# Patient Record
Sex: MALE | Race: WHITE | NOT HISPANIC OR LATINO | URBAN - METROPOLITAN AREA
[De-identification: names, ages, dates, MRNs, and addresses within clinical notes are randomized per-mention and may not be internally consistent; named-entity substitution may affect disease eponyms.]

---

## 2019-10-08 ENCOUNTER — INPATIENT (INPATIENT)
Facility: HOSPITAL | Age: 59
LOS: 6 days | Discharge: HOME | DRG: 291 | End: 2019-10-15
Attending: HOSPITALIST | Admitting: INTERNAL MEDICINE
Payer: MEDICARE

## 2019-10-08 VITALS — OXYGEN SATURATION: 100 % | HEART RATE: 102 BPM

## 2019-10-08 DIAGNOSIS — Z90.49 ACQUIRED ABSENCE OF OTHER SPECIFIED PARTS OF DIGESTIVE TRACT: Chronic | ICD-10-CM

## 2019-10-08 DIAGNOSIS — I77.9 DISORDER OF ARTERIES AND ARTERIOLES, UNSPECIFIED: Chronic | ICD-10-CM

## 2019-10-08 DIAGNOSIS — I50.9 HEART FAILURE, UNSPECIFIED: ICD-10-CM

## 2019-10-08 LAB
ALBUMIN SERPL ELPH-MCNC: 4.1 G/DL — SIGNIFICANT CHANGE UP (ref 3.3–5.2)
ALP SERPL-CCNC: 68 U/L — SIGNIFICANT CHANGE UP (ref 40–120)
ALT FLD-CCNC: 19 U/L — SIGNIFICANT CHANGE UP
ANION GAP SERPL CALC-SCNC: 16 MMOL/L — SIGNIFICANT CHANGE UP (ref 5–17)
APTT BLD: 31.1 SEC — SIGNIFICANT CHANGE UP (ref 27.5–36.3)
AST SERPL-CCNC: 23 U/L — SIGNIFICANT CHANGE UP
BASOPHILS # BLD AUTO: 0.08 K/UL — SIGNIFICANT CHANGE UP (ref 0–0.2)
BASOPHILS NFR BLD AUTO: 0.8 % — SIGNIFICANT CHANGE UP (ref 0–2)
BILIRUB SERPL-MCNC: 0.4 MG/DL — SIGNIFICANT CHANGE UP (ref 0.4–2)
BUN SERPL-MCNC: 20 MG/DL — SIGNIFICANT CHANGE UP (ref 8–20)
CALCIUM SERPL-MCNC: 9.6 MG/DL — SIGNIFICANT CHANGE UP (ref 8.6–10.2)
CHLORIDE SERPL-SCNC: 104 MMOL/L — SIGNIFICANT CHANGE UP (ref 98–107)
CO2 SERPL-SCNC: 23 MMOL/L — SIGNIFICANT CHANGE UP (ref 22–29)
CREAT SERPL-MCNC: 1.1 MG/DL — SIGNIFICANT CHANGE UP (ref 0.5–1.3)
EOSINOPHIL # BLD AUTO: 0.7 K/UL — HIGH (ref 0–0.5)
EOSINOPHIL NFR BLD AUTO: 6.6 % — HIGH (ref 0–6)
GAS PNL BLDV: SIGNIFICANT CHANGE UP
GLUCOSE SERPL-MCNC: 141 MG/DL — HIGH (ref 70–115)
HCT VFR BLD CALC: 47 % — SIGNIFICANT CHANGE UP (ref 39–50)
HGB BLD-MCNC: 15.5 G/DL — SIGNIFICANT CHANGE UP (ref 13–17)
IMM GRANULOCYTES NFR BLD AUTO: 0.2 % — SIGNIFICANT CHANGE UP (ref 0–1.5)
INR BLD: 1.07 RATIO — SIGNIFICANT CHANGE UP (ref 0.88–1.16)
LYMPHOCYTES # BLD AUTO: 3.97 K/UL — HIGH (ref 1–3.3)
LYMPHOCYTES # BLD AUTO: 37.7 % — SIGNIFICANT CHANGE UP (ref 13–44)
MCHC RBC-ENTMCNC: 30.3 PG — SIGNIFICANT CHANGE UP (ref 27–34)
MCHC RBC-ENTMCNC: 33 GM/DL — SIGNIFICANT CHANGE UP (ref 32–36)
MCV RBC AUTO: 91.8 FL — SIGNIFICANT CHANGE UP (ref 80–100)
MONOCYTES # BLD AUTO: 0.69 K/UL — SIGNIFICANT CHANGE UP (ref 0–0.9)
MONOCYTES NFR BLD AUTO: 6.6 % — SIGNIFICANT CHANGE UP (ref 2–14)
NEUTROPHILS # BLD AUTO: 5.07 K/UL — SIGNIFICANT CHANGE UP (ref 1.8–7.4)
NEUTROPHILS NFR BLD AUTO: 48.1 % — SIGNIFICANT CHANGE UP (ref 43–77)
NT-PROBNP SERPL-SCNC: 1310 PG/ML — HIGH (ref 0–300)
PLATELET # BLD AUTO: 263 K/UL — SIGNIFICANT CHANGE UP (ref 150–400)
POTASSIUM SERPL-MCNC: 3.9 MMOL/L — SIGNIFICANT CHANGE UP (ref 3.5–5.3)
POTASSIUM SERPL-SCNC: 3.9 MMOL/L — SIGNIFICANT CHANGE UP (ref 3.5–5.3)
PROT SERPL-MCNC: 7.9 G/DL — SIGNIFICANT CHANGE UP (ref 6.6–8.7)
PROTHROM AB SERPL-ACNC: 12.3 SEC — SIGNIFICANT CHANGE UP (ref 10–12.9)
RBC # BLD: 5.12 M/UL — SIGNIFICANT CHANGE UP (ref 4.2–5.8)
RBC # FLD: 12.9 % — SIGNIFICANT CHANGE UP (ref 10.3–14.5)
SODIUM SERPL-SCNC: 143 MMOL/L — SIGNIFICANT CHANGE UP (ref 135–145)
TROPONIN T SERPL-MCNC: <0.01 NG/ML — SIGNIFICANT CHANGE UP (ref 0–0.06)
WBC # BLD: 10.53 K/UL — HIGH (ref 3.8–10.5)
WBC # FLD AUTO: 10.53 K/UL — HIGH (ref 3.8–10.5)

## 2019-10-08 PROCEDURE — 71045 X-RAY EXAM CHEST 1 VIEW: CPT | Mod: 26

## 2019-10-08 PROCEDURE — 99223 1ST HOSP IP/OBS HIGH 75: CPT

## 2019-10-08 PROCEDURE — 93010 ELECTROCARDIOGRAM REPORT: CPT

## 2019-10-08 PROCEDURE — 71250 CT THORAX DX C-: CPT | Mod: 26

## 2019-10-08 PROCEDURE — 99291 CRITICAL CARE FIRST HOUR: CPT

## 2019-10-08 RX ORDER — NITROGLYCERIN 6.5 MG
1 CAPSULE, EXTENDED RELEASE ORAL ONCE
Refills: 0 | Status: COMPLETED | OUTPATIENT
Start: 2019-10-08 | End: 2019-10-08

## 2019-10-08 RX ORDER — SACCHAROMYCES BOULARDII 250 MG
250 POWDER IN PACKET (EA) ORAL
Refills: 0 | Status: DISCONTINUED | OUTPATIENT
Start: 2019-10-08 | End: 2019-10-10

## 2019-10-08 RX ORDER — ASPIRIN/CALCIUM CARB/MAGNESIUM 324 MG
81 TABLET ORAL DAILY
Refills: 0 | Status: DISCONTINUED | OUTPATIENT
Start: 2019-10-08 | End: 2019-10-10

## 2019-10-08 RX ORDER — ATENOLOL 25 MG/1
50 TABLET ORAL DAILY
Refills: 0 | Status: DISCONTINUED | OUTPATIENT
Start: 2019-10-08 | End: 2019-10-10

## 2019-10-08 RX ORDER — IPRATROPIUM/ALBUTEROL SULFATE 18-103MCG
3 AEROSOL WITH ADAPTER (GRAM) INHALATION EVERY 6 HOURS
Refills: 0 | Status: DISCONTINUED | OUTPATIENT
Start: 2019-10-08 | End: 2019-10-14

## 2019-10-08 RX ORDER — FUROSEMIDE 40 MG
40 TABLET ORAL
Refills: 0 | Status: DISCONTINUED | OUTPATIENT
Start: 2019-10-08 | End: 2019-10-09

## 2019-10-08 RX ORDER — PANTOPRAZOLE SODIUM 20 MG/1
40 TABLET, DELAYED RELEASE ORAL
Refills: 0 | Status: DISCONTINUED | OUTPATIENT
Start: 2019-10-08 | End: 2019-10-10

## 2019-10-08 RX ORDER — LEVETIRACETAM 250 MG/1
750 TABLET, FILM COATED ORAL
Refills: 0 | Status: DISCONTINUED | OUTPATIENT
Start: 2019-10-08 | End: 2019-10-10

## 2019-10-08 RX ORDER — ATORVASTATIN CALCIUM 80 MG/1
10 TABLET, FILM COATED ORAL AT BEDTIME
Refills: 0 | Status: DISCONTINUED | OUTPATIENT
Start: 2019-10-08 | End: 2019-10-10

## 2019-10-08 RX ORDER — FUROSEMIDE 40 MG
80 TABLET ORAL ONCE
Refills: 0 | Status: COMPLETED | OUTPATIENT
Start: 2019-10-08 | End: 2019-10-08

## 2019-10-08 RX ORDER — LISINOPRIL 2.5 MG/1
5 TABLET ORAL DAILY
Refills: 0 | Status: DISCONTINUED | OUTPATIENT
Start: 2019-10-08 | End: 2019-10-09

## 2019-10-08 RX ORDER — ENOXAPARIN SODIUM 100 MG/ML
40 INJECTION SUBCUTANEOUS DAILY
Refills: 0 | Status: DISCONTINUED | OUTPATIENT
Start: 2019-10-08 | End: 2019-10-15

## 2019-10-08 RX ORDER — AMLODIPINE BESYLATE 2.5 MG/1
10 TABLET ORAL DAILY
Refills: 0 | Status: DISCONTINUED | OUTPATIENT
Start: 2019-10-08 | End: 2019-10-10

## 2019-10-08 RX ADMIN — Medication 81 MILLIGRAM(S): at 18:37

## 2019-10-08 RX ADMIN — LEVETIRACETAM 750 MILLIGRAM(S): 250 TABLET, FILM COATED ORAL at 18:24

## 2019-10-08 RX ADMIN — Medication 80 MILLIGRAM(S): at 12:30

## 2019-10-08 RX ADMIN — ATORVASTATIN CALCIUM 10 MILLIGRAM(S): 80 TABLET, FILM COATED ORAL at 22:49

## 2019-10-08 RX ADMIN — Medication 1 INCH(S): at 12:38

## 2019-10-08 RX ADMIN — ENOXAPARIN SODIUM 40 MILLIGRAM(S): 100 INJECTION SUBCUTANEOUS at 18:38

## 2019-10-08 RX ADMIN — Medication 40 MILLIGRAM(S): at 18:39

## 2019-10-08 RX ADMIN — Medication 3 MILLILITER(S): at 22:06

## 2019-10-08 NOTE — CONSULT NOTE ADULT - SUBJECTIVE AND OBJECTIVE BOX
PULMONARY CONSULT NOTE      JONATHAN GIBSON  MRN-[id number]    Patient is a 59y old  Male who presents with a chief complaint of SOB    HISTORY OF PRESENT ILLNESS:    60 yo M with h/o HTN, CAD- stent, ? AI, thoracic aortic dissection requiring emergent surgery, CVA with left sided sensory loss, colostomy for bowel ischemia s/p reversal now presents with h/o of worsening SOB, ZUNIGA, orthopnea, and non-productive cough. He states this has happened before and n ER had severe sob with difficulty completing sentences. He also admits to significant anxiety. In ED, he needed bipap support but then weaned to NC O2 once he improved. He now feels much better but not quite back to baseline.  Pt with a h/o drug abuse with cocaine and ETOH though reportedly quit in 2001.    MEDICATIONS  (STANDING):  amLODIPine   Tablet 10 milliGRAM(s) Oral daily  aspirin enteric coated 81 milliGRAM(s) Oral daily  ATENolol  Tablet 50 milliGRAM(s) Oral daily  atorvastatin 10 milliGRAM(s) Oral at bedtime  enoxaparin Injectable 40 milliGRAM(s) SubCutaneous daily  furosemide   Injectable 40 milliGRAM(s) IV Push two times a day  levETIRAcetam 750 milliGRAM(s) Oral two times a day  levoFLOXacin IVPB      lisinopril 5 milliGRAM(s) Oral daily  pantoprazole    Tablet 40 milliGRAM(s) Oral before breakfast  saccharomyces boulardii 250 milliGRAM(s) Oral two times a day      MEDICATIONS  (PRN):      Allergies    penicillin (Unknown)    Intolerances        PAST MEDICAL & SURGICAL HISTORY:  CVA (cerebral vascular accident)  CHF (congestive heart failure)  H/O colectomy  Aorta disorder      FAMILY HISTORY:      SOCIAL HISTORY  Smoking History: 2-3 ppd x 20 years, quit 2009    REVIEW OF SYSTEMS:    CONSTITUTIONAL:  No fevers, chills, sweats    HEENT:  Eyes:  No diplopia or blurred vision. ENT:  No earache, sore throat or runny nose.    CARDIOVASCULAR:  No pressure, squeezing, tightness, or heaviness about the chest; no palpitations.    RESPIRATORY:  Per HPI    GASTROINTESTINAL:  No abdominal pain, nausea, vomiting or diarrhea.    GENITOURINARY:  No dysuria, frequency or urgency.    NEUROLOGIC:  No paresthesias, fasciculations, seizures or weakness.    PSYCHIATRIC:  No disorder of thought or mood.    Vital Signs Last 24 Hrs  T(C): 37.2 (08 Oct 2019 12:15), Max: 37.2 (08 Oct 2019 12:15)  T(F): 98.9 (08 Oct 2019 12:15), Max: 98.9 (08 Oct 2019 12:15)  HR: 80 (08 Oct 2019 13:09) (80 - 102)  BP: 179/79 (08 Oct 2019 13:09) (179/79 - 223/106)  BP(mean): --  RR: 22 (08 Oct 2019 13:09) (22 - 32)  SpO2: 94% (08 Oct 2019 13:09) (94% - 100%)    PHYSICAL EXAMINATION:    GENERAL: The patient is a well-developed, well-nourished obese male in no apparent distress.     HEENT: Head is normocephalic and atraumatic. Extraocular muscles are intact. Mucous membranes are moist.     NECK: Supple.     LUNGS: Clear to auscultation without wheezing or rhonchi but mild bibasilar rales. Respirations unlabored    HEART: Regular rate and rhythm without murmur.    ABDOMEN: Soft, nontender, and nondistended.  No hepatosplenomegaly is noted.    EXTREMITIES: Without any cyanosis, clubbing, with mild edema.    NEUROLOGIC: Grossly intact.      LABS:                        15.5   10.53 )-----------( 263      ( 08 Oct 2019 13:08 )             47.0     10-08    143  |  104  |  20.0  ----------------------------<  141<H>  3.9   |  23.0  |  1.10    Ca    9.6      08 Oct 2019 13:08    TPro  7.9  /  Alb  4.1  /  TBili  0.4  /  DBili  x   /  AST  23  /  ALT  19  /  AlkPhos  68  10-08    PT/INR - ( 08 Oct 2019 13:08 )   PT: 12.3 sec;   INR: 1.07 ratio         PTT - ( 08 Oct 2019 13:08 )  PTT:31.1 sec      CARDIAC MARKERS ( 08 Oct 2019 13:08 )  x     / <0.01 ng/mL / x     / x     / x            Serum Pro-Brain Natriuretic Peptide: 1310 pg/mL (10-08-19 @ 13:08)        RADIOLOGY & ADDITIONAL STUDIES:       EXAM:  CT CHEST                          PROCEDURE DATE:  10/08/2019          INTERPRETATION:  CLINICAL INFORMATION: Pulmonary nodules.     COMPARISON: 1/19/2010    PROCEDURE:   CT of the Chest was performed without intravenous contrast.  [place] coronal reformats were performed.      FINDINGS:    No evidence of mediastinal, hilar, or axillary lymphadenopathy. No   abnormalities seen at the base of the neck.    Small bilateral pleural effusions. No evidence of a pericardial effusion.    Coronary artery calcifications noted. Mild cardio megaly.    The visualized upper abdomen, calcifications are noted in keeping with   chronic aortic dissection. This is consistent with the finding of an   acute aortic dissection noted on 1/19/2010. 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 The aorta at this site   measures 3.1 cm in diameter on 1/19/2010. The proximal descending   thoracic aorta measures 4.3 cm no evidence of periaortic hematoma.    Diffuse groundglass interstitial prominence noted in the right lung,   right lower lobe worst and left lower lobe. Left basilar atelectasis.    Post sternotomy changes. This is a new finding since 1/19/2010. Evidence   of prior surgery involving the ascending thoracic aorta in keeping with   the presence of ascending thoracic aortic dissection noted on the prior   study.    No chest wall abnormality.    IMPRESSION:     Diffuse groundglass prominence noted throughout the right lung consistent   with diffuse pneumonia.    Small bilateral pleural effusions.    Left basilar atelectasis.    Interval development of an aneurysm at the level of the distal aortic   arch and proximal descending thoracic aorta measuring 4.1 cm and 4.3 cm   respectively since 1/19/2010. Cannot evaluate the lumen of the aorta   without IV contrast.          PEDRO LUIS COLEMAN M.D., ATTENDING RADIOLOGIST  This document has been electronically signed. Oct  8 2019  4:30PM

## 2019-10-08 NOTE — H&P ADULT - ASSESSMENT
58 yo M c PMH of HTn, CAD- stent, leaky aortic valve, thoracic aortic dissection requiring emergent surgery, CVA with left sided sensory loss, colostomy for bowel ischemia s/p reversal    # Acute  hypoxic respiratory failure  required BIPAP for a short while   now on NC O2  CT chest- GG opacities- possible pneumonia vs pulm edema  BNP elevated, mild leucocytosis  no h/o of both CHF and COPD  will give levaquin, oxygen, nebs  aggresive diuresis with lasix  ECHO  if no improvement will start steroid trial    # HTN  inital htn urgency in ED noted  now uncontrolled HTN  will resume h ome Bp meds and titrate as needed    # Pt states he overdoses on tylenol Pm and sleeps for 3 days in a row not waking for anything. and when he is awake binges on food until he throws up or has SOB. He also believes he has today's problems sec to panic attack.   suspect underlying anxiety/ depression with prior h/o addiction  will get psych consult    Lovenox

## 2019-10-08 NOTE — ED PROVIDER NOTE - CLINICAL SUMMARY MEDICAL DECISION MAKING FREE TEXT BOX
58 yo M c PMH of CHF, leaky aortic valve (s/p repair), CVA (c residual L sided weakness), colostomy s/p reversal p/w 2 week h/o of worsening SOB, ZUNIGA, orthopnea, and non-productive cough. On exam, /106, VS otherwise wnl, +diffuse rales marked increased wob no ble edema. started on bipap with improvement in sx. will obtain cxr/ekg/labs. will give lasix/ntg for chf exacerbation and admit

## 2019-10-08 NOTE — ED PROVIDER NOTE - ATTENDING CONTRIBUTION TO CARE
I personally saw the patient with the resident, and completed the key components of the history and physical exam. I then discussed the management plan with the resident.    58 y/o M with PMH CHF, CVA presents in respiratory distress x 2 weeks, gradually worsening, now unable to speak in full sentences. Does not wear O2 at home. Unable to complete ROS due to severe respiratory distress.    PE: Anxious, severe respiratory distress, tachycardic, diffuse rales in all lung fields, abd soft, NT/ND, old surgical scars on abdomen, no LE edema.    Patient placed on BiPAP, improved breathing, found to be hypertensive, will treat with 80 lasix and nitropaste to control BP and facilitate diuresis, plan for admission for CHF exacerbation.

## 2019-10-08 NOTE — H&P ADULT - NSHPPHYSICALEXAM_GEN_ALL_CORE
Vital Signs Last 24 Hrs  T(C): 37.2 (10-08-19 @ 12:15), Max: 37.2 (10-08-19 @ 12:15)  T(F): 98.9 (10-08-19 @ 12:15), Max: 98.9 (10-08-19 @ 12:15)  HR: 80 (10-08-19 @ 13:09) (80 - 102)  BP: 179/79 (10-08-19 @ 13:09) (179/79 - 223/106)  BP(mean): --  RR: 22 (10-08-19 @ 13:09) (22 - 32)  SpO2: 94% (10-08-19 @ 13:09) (94% - 100%)  GENERAL: no distress, comfortable lying in bed on NC O2  HEAD:  Atraumatic, Normocephalic  EYES: EOMI, conjunctiva and sclera clear  NECK: No JVD, Normal thyroid  NERVOUS SYSTEM:  Alert & Oriented X 3, Motor Strength 5/5 B/L upper and lower extremities;  CHEST/LUNG: [place] basal rales +, No rhonchi, wheezing, or rubs  HEART: Regular rate and rhythm; No murmurs, rubs, or gallops  ABDOMEN: Soft, Nontender, Nondistended; Bowel sounds present; surgical scars +   EXTREMITIES:  2+ Peripheral Pulses, No clubbing, cyanosis, or edema  SKIN: No rashes or lesions

## 2019-10-08 NOTE — ED PROVIDER NOTE - OBJECTIVE STATEMENT
58 yo M c PMH of CHF, leaky aortic valve (s/p repair), CVA (c residual L sided weakness), colostomy s/p reversal p/w 2 week h/o of worsening SOB, ZUNIGA, orthopnea, and non-productive cough. states this has happened before. has severe sob and difficulty completing sentences, does not know home meds. states he does not wear home o2/masks. cannot complete full hpi as pt needs urgent intervention for sob

## 2019-10-08 NOTE — ED PROVIDER NOTE - PROGRESS NOTE DETAILS
Cb PGY3: pt markedly improved on reassessment more alert no increased wob now off bipap, s/p ntg lasix and bipap will admit for chf exacerbation

## 2019-10-08 NOTE — H&P ADULT - HISTORY OF PRESENT ILLNESS
58 yo M c PMH of HTn, CAD- stent, leaky aortic valve, thoracic aortic dissection requiring emergent surgery, CVA with left sided sensory loss, colostomy for bowel ischemia s/p reversal now presents with h/o of worsening SOB, ZUNIGA, orthopnea, and non-productive cough. states this has happened before. has severe sob and difficulty completing sentences,   In ED needed bipap for a short while, then weaned to NC O2.    Sh- used to be cocaine adict, social alcohol user and 30x 3 ppd history. quit all in 2001, lives alone  now addicted to tylenol PM and overdoses on same   FH- everybody is an alcoholic

## 2019-10-09 DIAGNOSIS — F33.1 MAJOR DEPRESSIVE DISORDER, RECURRENT, MODERATE: ICD-10-CM

## 2019-10-09 LAB
ANION GAP SERPL CALC-SCNC: 18 MMOL/L — HIGH (ref 5–17)
BUN SERPL-MCNC: 25 MG/DL — HIGH (ref 8–20)
CALCIUM SERPL-MCNC: 9.4 MG/DL — SIGNIFICANT CHANGE UP (ref 8.6–10.2)
CHLORIDE SERPL-SCNC: 101 MMOL/L — SIGNIFICANT CHANGE UP (ref 98–107)
CO2 SERPL-SCNC: 26 MMOL/L — SIGNIFICANT CHANGE UP (ref 22–29)
CREAT SERPL-MCNC: 1.18 MG/DL — SIGNIFICANT CHANGE UP (ref 0.5–1.3)
GLUCOSE SERPL-MCNC: 91 MG/DL — SIGNIFICANT CHANGE UP (ref 70–115)
HCT VFR BLD CALC: 46.2 % — SIGNIFICANT CHANGE UP (ref 39–50)
HCV AB S/CO SERPL IA: 0.19 S/CO — SIGNIFICANT CHANGE UP (ref 0–0.99)
HCV AB SERPL-IMP: SIGNIFICANT CHANGE UP
HGB BLD-MCNC: 15 G/DL — SIGNIFICANT CHANGE UP (ref 13–17)
MCHC RBC-ENTMCNC: 30.4 PG — SIGNIFICANT CHANGE UP (ref 27–34)
MCHC RBC-ENTMCNC: 32.5 GM/DL — SIGNIFICANT CHANGE UP (ref 32–36)
MCV RBC AUTO: 93.5 FL — SIGNIFICANT CHANGE UP (ref 80–100)
PLATELET # BLD AUTO: 213 K/UL — SIGNIFICANT CHANGE UP (ref 150–400)
POTASSIUM SERPL-MCNC: 3.4 MMOL/L — LOW (ref 3.5–5.3)
POTASSIUM SERPL-SCNC: 3.4 MMOL/L — LOW (ref 3.5–5.3)
RBC # BLD: 4.94 M/UL — SIGNIFICANT CHANGE UP (ref 4.2–5.8)
RBC # FLD: 13.1 % — SIGNIFICANT CHANGE UP (ref 10.3–14.5)
SODIUM SERPL-SCNC: 145 MMOL/L — SIGNIFICANT CHANGE UP (ref 135–145)
WBC # BLD: 10.58 K/UL — HIGH (ref 3.8–10.5)
WBC # FLD AUTO: 10.58 K/UL — HIGH (ref 3.8–10.5)

## 2019-10-09 PROCEDURE — 99233 SBSQ HOSP IP/OBS HIGH 50: CPT

## 2019-10-09 PROCEDURE — 93306 TTE W/DOPPLER COMPLETE: CPT | Mod: 26

## 2019-10-09 PROCEDURE — 90792 PSYCH DIAG EVAL W/MED SRVCS: CPT

## 2019-10-09 RX ORDER — LANOLIN ALCOHOL/MO/W.PET/CERES
3 CREAM (GRAM) TOPICAL ONCE
Refills: 0 | Status: COMPLETED | OUTPATIENT
Start: 2019-10-09 | End: 2019-10-09

## 2019-10-09 RX ORDER — LISINOPRIL 2.5 MG/1
10 TABLET ORAL DAILY
Refills: 0 | Status: DISCONTINUED | OUTPATIENT
Start: 2019-10-09 | End: 2019-10-10

## 2019-10-09 RX ORDER — POTASSIUM CHLORIDE 20 MEQ
40 PACKET (EA) ORAL ONCE
Refills: 0 | Status: COMPLETED | OUTPATIENT
Start: 2019-10-09 | End: 2019-10-09

## 2019-10-09 RX ORDER — ZOLPIDEM TARTRATE 10 MG/1
5 TABLET ORAL AT BEDTIME
Refills: 0 | Status: DISCONTINUED | OUTPATIENT
Start: 2019-10-09 | End: 2019-10-10

## 2019-10-09 RX ORDER — FUROSEMIDE 40 MG
40 TABLET ORAL DAILY
Refills: 0 | Status: DISCONTINUED | OUTPATIENT
Start: 2019-10-09 | End: 2019-10-10

## 2019-10-09 RX ADMIN — Medication 3 MILLILITER(S): at 14:29

## 2019-10-09 RX ADMIN — ENOXAPARIN SODIUM 40 MILLIGRAM(S): 100 INJECTION SUBCUTANEOUS at 13:09

## 2019-10-09 RX ADMIN — Medication 40 MILLIEQUIVALENT(S): at 17:12

## 2019-10-09 RX ADMIN — ATENOLOL 50 MILLIGRAM(S): 25 TABLET ORAL at 05:17

## 2019-10-09 RX ADMIN — ZOLPIDEM TARTRATE 5 MILLIGRAM(S): 10 TABLET ORAL at 22:49

## 2019-10-09 RX ADMIN — Medication 81 MILLIGRAM(S): at 13:09

## 2019-10-09 RX ADMIN — LEVETIRACETAM 750 MILLIGRAM(S): 250 TABLET, FILM COATED ORAL at 17:11

## 2019-10-09 RX ADMIN — Medication 3 MILLILITER(S): at 04:47

## 2019-10-09 RX ADMIN — Medication 250 MILLIGRAM(S): at 17:11

## 2019-10-09 RX ADMIN — Medication 40 MILLIGRAM(S): at 05:17

## 2019-10-09 RX ADMIN — Medication 250 MILLIGRAM(S): at 05:16

## 2019-10-09 RX ADMIN — Medication 3 MILLILITER(S): at 20:42

## 2019-10-09 RX ADMIN — Medication 3 MILLIGRAM(S): at 02:03

## 2019-10-09 RX ADMIN — Medication 3 MILLILITER(S): at 08:27

## 2019-10-09 RX ADMIN — PANTOPRAZOLE SODIUM 40 MILLIGRAM(S): 20 TABLET, DELAYED RELEASE ORAL at 05:16

## 2019-10-09 RX ADMIN — ATORVASTATIN CALCIUM 10 MILLIGRAM(S): 80 TABLET, FILM COATED ORAL at 22:49

## 2019-10-09 RX ADMIN — Medication 1 INCH(S): at 00:47

## 2019-10-09 RX ADMIN — LEVETIRACETAM 750 MILLIGRAM(S): 250 TABLET, FILM COATED ORAL at 05:17

## 2019-10-09 RX ADMIN — LISINOPRIL 5 MILLIGRAM(S): 2.5 TABLET ORAL at 05:17

## 2019-10-09 RX ADMIN — AMLODIPINE BESYLATE 10 MILLIGRAM(S): 2.5 TABLET ORAL at 05:16

## 2019-10-09 RX ADMIN — LISINOPRIL 10 MILLIGRAM(S): 2.5 TABLET ORAL at 13:09

## 2019-10-09 NOTE — BEHAVIORAL HEALTH ASSESSMENT NOTE - HPI (INCLUDE ILLNESS QUALITY, SEVERITY, DURATION, TIMING, CONTEXT, MODIFYING FACTORS, ASSOCIATED SIGNS AND SYMPTOMS)
Patient is a 59 year old, male; domicile by self; never ; unemployed on disability ; with no formal history of psychiatric treatment but with h/o anxiety and depression last 10 years; no psychiatric  hospitalizations; no known suicide attempts; h/o prior cocaine use (no use since ) ; PMH of HTN, CAD- stent, leaky aortic valve, thoracic aortic dissection requiring emergent surgery, CVA with left sided sensory loss, colostomy for bowel ischemia s/p reversal now presents with h/o of worsening SOB. Currently being treated for CHF exacerbation vs pneumonia. Asked for psychiatric evaluation and medication recommendation.       Patient reports that he has h/o prior "wild" , fast life and and has past of using cocaine, going to clubs while at the same time remaining functional and working as a .  He has multiple medical problems which include 2 heart attacks (last in ) and stroke. Since then he stopped all substance use and cigarette smoking and has some emergence of anxiety. He reports anxiety usually in context of medical problems or medical symptoms.  At times he has panic attacks that are triggered. His mother also  2 years ago. He takes care of her dog that lives with him.        Patient admits that he is largely isolative the last 8 years and stays mostly in his house. He sometimes shops at night and has contact with two brothers.  He has been on Cymbalta in the past for neuropathic pain and he stopped it 3 months ago (didn't find helpful).  Patient reports that his appetite fluctuates, he has decreased motivation, decreased energy and concentration, no drive, poor concentration, and disturbed sleep. He has been taking tylenol PM for years and has high tolerance. He reports he often takes handful of medications to sleep and can spend two to three days in bed- just getting up enough to take care of dog.  He eats erratically and his weight can fluctuate 40-50 lbs up and down.  Patient denies any intention to hurt himself. He states "I am not doing anything stupid, not looking to kill self or anything".      Concerning other psychiatric symptoms, pt denies  any aggressive or violent behavior towards others. Pt denies any episodes of bizarre happiness, unusual energy, unusual nightime excitation or other common symptoms of sapna. Pt denies hearing voices or seeing things.  No delusions were elicited.  Patient denies obsessions or compulsions.      Patient is not interested in medications at this time, but discussed possible options and is willing to consider. Support was provided. Patient states he is willing to consider going to therapy. His family has been urging him for years.

## 2019-10-09 NOTE — BEHAVIORAL HEALTH ASSESSMENT NOTE - NSBHCHARTREVIEWLAB_PSY_A_CORE FT
15.0   10.58 )-----------( 213      ( 09 Oct 2019 07:27 )             46.2     10-09    145  |  101  |  25.0<H>  ----------------------------<  91  3.4<L>   |  26.0  |  1.18    Ca    9.4      09 Oct 2019 07:27    TPro  7.9  /  Alb  4.1  /  TBili  0.4  /  DBili  x   /  AST  23  /  ALT  19  /  AlkPhos  68  10-08    LIVER FUNCTIONS - ( 08 Oct 2019 13:08 )  Alb: 4.1 g/dL / Pro: 7.9 g/dL / ALK PHOS: 68 U/L / ALT: 19 U/L / AST: 23 U/L / GGT: x           PT/INR - ( 08 Oct 2019 13:08 )   PT: 12.3 sec;   INR: 1.07 ratio         PTT - ( 08 Oct 2019 13:08 )  PTT:31.1 sec

## 2019-10-09 NOTE — BEHAVIORAL HEALTH ASSESSMENT NOTE - NSBHCHARTREVIEWVS_PSY_A_CORE FT
Vital Signs Last 24 Hrs  T(C): 36.4 (09 Oct 2019 07:37), Max: 36.8 (09 Oct 2019 05:17)  T(F): 97.6 (09 Oct 2019 07:37), Max: 98.2 (09 Oct 2019 05:17)  HR: 74 (09 Oct 2019 08:43) (65 - 80)  BP: 160/68 (09 Oct 2019 07:37) (153/61 - 179/79)  BP(mean): --  RR: 18 (09 Oct 2019 07:37) (18 - 22)  SpO2: 95% (09 Oct 2019 08:43) (94% - 99%)

## 2019-10-09 NOTE — BEHAVIORAL HEALTH ASSESSMENT NOTE - SUMMARY
Patient is a 59 year old, male; domicile by self; never ; unemployed on disability ; with no formal history of psychiatric treatment but with h/o anxiety and depression last 10 years; no psychiatric  hospitalizations; no known suicide attempts; h/o prior cocaine use (no use since 2001) ; PMH of HTN, CAD- stent, leaky aortic valve, thoracic aortic dissection requiring emergent surgery, CVA with left sided sensory loss, colostomy for bowel ischemia s/p reversal now presents with h/o of worsening SOB. Currently being treated for CHF exacerbation vs pneumonia. Asked for psychiatric evaluation and medication recommendation.  Patient does reports h/o consistent with moderate MDD, denies any S/H I/I/P with stress related to medical problems, loss of mother 2 years ago.  He has also been taking excessive tylenol PM every night for last 10 years to help sleep. He sometimes spends 2-3 days in bed.  Denies any suicidal intent.  He does have cocaine abuse in remission since 2001, and gets panic attacks at times triggered by medical condition or symptoms.  Discussed tx options. Does not want to take medications at this time but will consider.  Support provided. Agreed to outpatient referral to [facility]. Patient reports he does not do well in groupss

## 2019-10-09 NOTE — PROGRESS NOTE ADULT - ASSESSMENT
60 yo M c PMH of HTn, CAD- stent, leaky aortic valve, thoracic aortic dissection requiring emergent surgery, CVA with left sided sensory loss, colostomy for bowel ischemia s/p reversal    # Acute  hypoxic respiratory failure  required BIPAP for a short while   now on room air  CT chest- GG opacities- possible pneumonia vs pulm edema  BNP elevated, mild leucocytosis  no h/o of both CHF and COPD  non toxic, no fever  will give levaquin, oxygen, nebs  aggressive diuresis with lasix  ECHO - P  if no improvement will start steroid trial  d/w cardiology- his Feb ECHO with EF 45-50% and mild to mod MR    # HTN  inital htn urgency in ED noted  now uncontrolled HTN  will resume home Bp meds and titrate as needed    # Pt states he overdoses on tylenol Pm and sleeps for 3 days in a row not waking for anything. and when he is awake binges on food until he throws up or has SOB. He also believes he has today's problems sec to panic attack.   suspect underlying anxiety/ depression with prior h/o addiction  appreciate psych consult  o/p psych appointment with [facility] to be set up by [facility]    Possible d/c home in AM with PO antibiotics and po lasix. if symptoms persist or worsen then will need uptitration of antibiotics given that he is on Humira.

## 2019-10-09 NOTE — PROGRESS NOTE ADULT - ASSESSMENT
Former smoker  Possible underlying COPD given smoking hx  H/o CAD s/p stent  Given elevated BNP with b/l GGO which could indicate pulm edema, CHF is of concern  Pt reports h/o VHD; ? AI  Obese  Hypoxic  S/p respiratory failure requiring BiPAP therapy; now on NCO2    Rec:    Drug nebs for suspected COPD given smoking hx  Empiric abx for possible pneumonia though denies cough, fevers, and does not have leukocytosis  Continue diuresis as tolerates  Optimize cardiac function  Cardiology f/u  Observe off of steroids for now as pt without B-spasm on exam  Need outpt f/u of ct  Consider outpt PSG for possible COLLEEN  Weight loss  PFTs as outpt   Pulm f/u after d/c    d/w med

## 2019-10-09 NOTE — PROGRESS NOTE ADULT - SUBJECTIVE AND OBJECTIVE BOX
PULMONARY PROGRESS NOTE      [id number]    Patient is a 59y old  Male who presents with a chief complaint of SOB (08 Oct 2019 18:29)      INTERVAL HPI/OVERNIGHT EVENTS: Feeling much better. Denies any sob or cough now. Says he feels great.     MEDICATIONS  (STANDING):  ALBUTerol/ipratropium for Nebulization 3 milliLiter(s) Nebulizer every 6 hours  amLODIPine   Tablet 10 milliGRAM(s) Oral daily  aspirin enteric coated 81 milliGRAM(s) Oral daily  ATENolol  Tablet 50 milliGRAM(s) Oral daily  atorvastatin 10 milliGRAM(s) Oral at bedtime  enoxaparin Injectable 40 milliGRAM(s) SubCutaneous daily  furosemide   Injectable 40 milliGRAM(s) IV Push daily  levETIRAcetam 750 milliGRAM(s) Oral two times a day  levoFLOXacin IVPB      levoFLOXacin IVPB 750 milliGRAM(s) IV Intermittent every 24 hours  lisinopril 10 milliGRAM(s) Oral daily  pantoprazole    Tablet 40 milliGRAM(s) Oral before breakfast  potassium chloride    Tablet ER 40 milliEquivalent(s) Oral once  saccharomyces boulardii 250 milliGRAM(s) Oral two times a day      MEDICATIONS  (PRN):      Allergies    penicillin (Unknown)    Intolerances        PAST MEDICAL & SURGICAL HISTORY:  CVA (cerebral vascular accident)  CHF (congestive heart failure)  H/O colectomy  Aorta disorder          REVIEW OF SYSTEMS:    CONSTITUTIONAL:  No distress    HEENT:  Eyes:  No diplopia or blurred vision. ENT:  No earache, sore throat or runny nose.    CARDIOVASCULAR:  No pressure, squeezing, tightness, heaviness or aching about the chest; no palpitations.    RESPIRATORY:  per HPI     GASTROINTESTINAL:  No nausea, vomiting or diarrhea.    GENITOURINARY:  No dysuria, frequency or urgency.    NEUROLOGIC:  No paresthesias, fasciculations, seizures or weakness.    PSYCHIATRIC:  No disorder of thought or mood.    Vital Signs Last 24 Hrs  T(C): 36.4 (09 Oct 2019 07:37), Max: 36.8 (09 Oct 2019 05:17)  T(F): 97.6 (09 Oct 2019 07:37), Max: 98.2 (09 Oct 2019 05:17)  HR: 74 (09 Oct 2019 08:43) (65 - 79)  BP: 160/68 (09 Oct 2019 07:37) (153/61 - 165/70)  BP(mean): --  RR: 18 (09 Oct 2019 07:37) (18 - 20)  SpO2: 95% (09 Oct 2019 08:43) (95% - 99%)    PHYSICAL EXAMINATION:    GENERAL: The patient is awake and alert in no apparent distress.     HEENT: Head is normocephalic and atraumatic.  Mucous membranes are moist.    NECK: Supple.    LUNGS: rales at bases, no wheeze,  respirations unlabored    HEART: Regular rate and rhythm     ABDOMEN: Soft, nontender,  obese    EXTREMITIES: Without any cyanosis, clubbing, rash, lesions or edema.    NEUROLOGIC: Grossly intact.    LABS:                        15.0   10.58 )-----------( 213      ( 09 Oct 2019 07:27 )             46.2     10-09    145  |  101  |  25.0<H>  ----------------------------<  91  3.4<L>   |  26.0  |  1.18    Ca    9.4      09 Oct 2019 07:27    TPro  7.9  /  Alb  4.1  /  TBili  0.4  /  DBili  x   /  AST  23  /  ALT  19  /  AlkPhos  68  10-08    PT/INR - ( 08 Oct 2019 13:08 )   PT: 12.3 sec;   INR: 1.07 ratio         PTT - ( 08 Oct 2019 13:08 )  PTT:31.1 sec      CARDIAC MARKERS ( 08 Oct 2019 13:08 )  x     / <0.01 ng/mL / x     / x     / x            Serum Pro-Brain Natriuretic Peptide: 1310 pg/mL (10-08-19 @ 13:08)           RADIOLOGY & ADDITIONAL STUDIES:  < from: CT Chest No Cont (10.08.19 @ 16:04) >     EXAM:  CT CHEST                          PROCEDURE DATE:  10/08/2019          INTERPRETATION:  CLINICAL INFORMATION: Pulmonary nodules.     COMPARISON: 1/19/2010    PROCEDURE:   CT of the Chest was performed without intravenous contrast.  [place] coronal reformats were performed.      FINDINGS:    No evidence of mediastinal, hilar, or axillary lymphadenopathy. No   abnormalities seen at the base of the neck.    Small bilateral pleural effusions. No evidence of a pericardial effusion.    Coronary artery calcifications noted. Mild cardio megaly.    The visualized upper abdomen, calcifications are noted in keeping with   chronic aortic dissection. This is consistent with the finding of an   acute aortic dissection noted on 1/19/2010. 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 The aorta at this site   measures 3.1 cm in diameter on 1/19/2010. The proximal descending   thoracic aorta measures 4.3 cm no evidence of periaortic hematoma.    Diffuse groundglass interstitial prominence noted in the right lung,   right lower lobe worst and left lower lobe. Left basilar atelectasis.    Post sternotomy changes. This is a new finding since 1/19/2010. Evidence   of prior surgery involving the ascending thoracic aorta in keeping with   the presence of ascending thoracic aortic dissection noted on the prior   study.    No chest wall abnormality.    IMPRESSION:     Diffuse groundglass prominence noted throughout the right lung consistent   with diffuse pneumonia.    Small bilateral pleural effusions.    Left basilar atelectasis.    Interval development of an aneurysm at the level of the distal aortic   arch and proximal descending thoracic aorta measuring 4.1 cm and 4.3 cm   respectively since 1/19/2010. Cannot evaluate the lumen of the aorta   without IV contrast.                    PEDRO LUIS COLEMAN M.D., ATTENDING RADIOLOGIST    < end of copied text >

## 2019-10-09 NOTE — PROGRESS NOTE ADULT - SUBJECTIVE AND OBJECTIVE BOX
HEALTH ISSUES - PROBLEM Dx:  60 yo M c PMH of HTN, CAD- stent, leaky aortic valve, thoracic aortic dissection requiring emergent surgery, CVA with left sided sensory loss, colostomy for bowel ischemia s/p reversal    acute hypoxic resp failure- chf vs pna    INTERVAL HPI/ OVERNIGHT EVENTS:    comfortable on room air  d/w cardiology  explained possible discharge home in AM    REVIEW OF SYSTEMS:    fever- hypoxia - sob - cough -     Vital Signs Last 24 Hrs  T(C): 36.8 (09 Oct 2019 16:04), Max: 36.8 (09 Oct 2019 05:17)  T(F): 98.3 (09 Oct 2019 16:04), Max: 98.3 (09 Oct 2019 16:04)  HR: 80 (09 Oct 2019 16:04) (65 - 80)  BP: 181/75 (09 Oct 2019 16:04) (153/61 - 181/75)  BP(mean): --  RR: 18 (09 Oct 2019 16:04) (18 - 18)  SpO2: 96% (09 Oct 2019 16:04) (95% - 99%)    PHYSICAL EXAM-  GENERAL: no distress, comfortable lying in bed on room air  HEAD:  Atraumatic, Normocephalic  EYES: EOMI, conjunctiva and sclera clear  NECK: No JVD, Normal thyroid  NERVOUS SYSTEM:  Alert & Oriented X 3, Motor Strength 5/5 B/L upper and lower extremities;  CHEST/LUNG: CTA [place], No rhonchi, wheezing, or rubs  HEART: Regular rate and rhythm; No murmurs, rubs, or gallops  ABDOMEN: Soft, Nontender, Nondistended; Bowel sounds present; surgical scars +   EXTREMITIES:  2+ Peripheral Pulses, No clubbing, cyanosis, or edema  SKIN: No rashes or lesions    MEDICATIONS  (STANDING):  ALBUTerol/ipratropium for Nebulization 3 milliLiter(s) Nebulizer every 6 hours  amLODIPine   Tablet 10 milliGRAM(s) Oral daily  aspirin enteric coated 81 milliGRAM(s) Oral daily  ATENolol  Tablet 50 milliGRAM(s) Oral daily  atorvastatin 10 milliGRAM(s) Oral at bedtime  enoxaparin Injectable 40 milliGRAM(s) SubCutaneous daily  furosemide   Injectable 40 milliGRAM(s) IV Push daily  levETIRAcetam 750 milliGRAM(s) Oral two times a day  levoFLOXacin IVPB      levoFLOXacin IVPB 750 milliGRAM(s) IV Intermittent every 24 hours  lisinopril 10 milliGRAM(s) Oral daily  pantoprazole    Tablet 40 milliGRAM(s) Oral before breakfast  saccharomyces boulardii 250 milliGRAM(s) Oral two times a day    MEDICATIONS  (PRN):      LABS:                        15.0   10.58 )-----------( 213      ( 09 Oct 2019 07:27 )             46.2     10-09    145  |  101  |  25.0<H>  ----------------------------<  91  3.4<L>   |  26.0  |  1.18    Ca    9.4      09 Oct 2019 07:27    TPro  7.9  /  Alb  4.1  /  TBili  0.4  /  DBili  x   /  AST  23  /  ALT  19  /  AlkPhos  68  10-08    PT/INR - ( 08 Oct 2019 13:08 )   PT: 12.3 sec;   INR: 1.07 ratio         PTT - ( 08 Oct 2019 13:08 )  PTT:31.1 sec

## 2019-10-09 NOTE — BEHAVIORAL HEALTH ASSESSMENT NOTE - DESCRIPTION (FIRST USE, LAST USE, QUANTITY, FREQUENCY, DURATION)
prior smoking history. Denies since 2001 h/o heavy cocaine use since 20's-stopped after heart attack in 2001 reports some h/o benzo abuse but has not taken since prior to 2001

## 2019-10-09 NOTE — BEHAVIORAL HEALTH ASSESSMENT NOTE - RISK ASSESSMENT
Low Acute Suicide Risk Patient does take excessive tyenol PM every night for last 10 years but not as suicide attempt, also has panic attacks and anxiety triggered by medical symptoms, depressed mood and isolative, however no h/o prior suicide attempt, has some support, takes care of dog, no psychotic, has maintained sobriety of cocaine and other substances for 10 years, reports willingness to engage with outpatient tx and consider other medication options.

## 2019-10-10 ENCOUNTER — TRANSCRIPTION ENCOUNTER (OUTPATIENT)
Age: 59
End: 2019-10-10

## 2019-10-10 LAB
ANION GAP SERPL CALC-SCNC: 14 MMOL/L — SIGNIFICANT CHANGE UP (ref 5–17)
BUN SERPL-MCNC: 29 MG/DL — HIGH (ref 8–20)
CALCIUM SERPL-MCNC: 9.4 MG/DL — SIGNIFICANT CHANGE UP (ref 8.6–10.2)
CHLORIDE SERPL-SCNC: 101 MMOL/L — SIGNIFICANT CHANGE UP (ref 98–107)
CO2 SERPL-SCNC: 26 MMOL/L — SIGNIFICANT CHANGE UP (ref 22–29)
CREAT SERPL-MCNC: 1.29 MG/DL — SIGNIFICANT CHANGE UP (ref 0.5–1.3)
GLUCOSE SERPL-MCNC: 114 MG/DL — SIGNIFICANT CHANGE UP (ref 70–115)
POTASSIUM SERPL-MCNC: 3.5 MMOL/L — SIGNIFICANT CHANGE UP (ref 3.5–5.3)
POTASSIUM SERPL-SCNC: 3.5 MMOL/L — SIGNIFICANT CHANGE UP (ref 3.5–5.3)
SODIUM SERPL-SCNC: 141 MMOL/L — SIGNIFICANT CHANGE UP (ref 135–145)
TROPONIN T SERPL-MCNC: <0.01 NG/ML — SIGNIFICANT CHANGE UP (ref 0–0.06)
TROPONIN T SERPL-MCNC: <0.01 NG/ML — SIGNIFICANT CHANGE UP (ref 0–0.06)

## 2019-10-10 PROCEDURE — 99232 SBSQ HOSP IP/OBS MODERATE 35: CPT

## 2019-10-10 PROCEDURE — 71275 CT ANGIOGRAPHY CHEST: CPT | Mod: 26

## 2019-10-10 PROCEDURE — 74174 CTA ABD&PLVS W/CONTRAST: CPT | Mod: 26

## 2019-10-10 PROCEDURE — 99233 SBSQ HOSP IP/OBS HIGH 50: CPT

## 2019-10-10 PROCEDURE — 93010 ELECTROCARDIOGRAM REPORT: CPT

## 2019-10-10 RX ORDER — LACTULOSE 10 G/15ML
10 SOLUTION ORAL AT BEDTIME
Refills: 0 | Status: DISCONTINUED | OUTPATIENT
Start: 2019-10-10 | End: 2019-10-10

## 2019-10-10 RX ORDER — LEVETIRACETAM 250 MG/1
750 TABLET, FILM COATED ORAL EVERY 12 HOURS
Refills: 0 | Status: DISCONTINUED | OUTPATIENT
Start: 2019-10-10 | End: 2019-10-15

## 2019-10-10 RX ORDER — SENNA PLUS 8.6 MG/1
2 TABLET ORAL
Qty: 60 | Refills: 0
Start: 2019-10-10 | End: 2019-11-08

## 2019-10-10 RX ORDER — METOPROLOL TARTRATE 50 MG
5 TABLET ORAL EVERY 6 HOURS
Refills: 0 | Status: DISCONTINUED | OUTPATIENT
Start: 2019-10-10 | End: 2019-10-11

## 2019-10-10 RX ORDER — PANTOPRAZOLE SODIUM 20 MG/1
40 TABLET, DELAYED RELEASE ORAL DAILY
Refills: 0 | Status: DISCONTINUED | OUTPATIENT
Start: 2019-10-10 | End: 2019-10-14

## 2019-10-10 RX ORDER — LACTULOSE 10 G/15ML
10 SOLUTION ORAL ONCE
Refills: 0 | Status: COMPLETED | OUTPATIENT
Start: 2019-10-10 | End: 2019-10-10

## 2019-10-10 RX ORDER — DOCUSATE SODIUM 100 MG
100 CAPSULE ORAL THREE TIMES A DAY
Refills: 0 | Status: DISCONTINUED | OUTPATIENT
Start: 2019-10-10 | End: 2019-10-10

## 2019-10-10 RX ORDER — DOCUSATE SODIUM 100 MG
1 CAPSULE ORAL
Qty: 90 | Refills: 0
Start: 2019-10-10 | End: 2019-11-08

## 2019-10-10 RX ORDER — ASPIRIN/ACETAMINOPHEN/CAFFEINE 250-250-65
30 TABLET ORAL
Qty: 0 | Refills: 0 | DISCHARGE

## 2019-10-10 RX ORDER — FUROSEMIDE 40 MG
1 TABLET ORAL
Qty: 5 | Refills: 0
Start: 2019-10-10 | End: 2019-10-14

## 2019-10-10 RX ORDER — METOCLOPRAMIDE HCL 10 MG
10 TABLET ORAL EVERY 8 HOURS
Refills: 0 | Status: COMPLETED | OUTPATIENT
Start: 2019-10-10 | End: 2019-10-13

## 2019-10-10 RX ORDER — SENNA PLUS 8.6 MG/1
2 TABLET ORAL AT BEDTIME
Refills: 0 | Status: DISCONTINUED | OUTPATIENT
Start: 2019-10-10 | End: 2019-10-10

## 2019-10-10 RX ORDER — METOCLOPRAMIDE HCL 10 MG
10 TABLET ORAL ONCE
Refills: 0 | Status: COMPLETED | OUTPATIENT
Start: 2019-10-10 | End: 2019-10-10

## 2019-10-10 RX ORDER — SACCHAROMYCES BOULARDII 250 MG
1 POWDER IN PACKET (EA) ORAL
Qty: 20 | Refills: 0
Start: 2019-10-10 | End: 2019-10-19

## 2019-10-10 RX ADMIN — PANTOPRAZOLE SODIUM 40 MILLIGRAM(S): 20 TABLET, DELAYED RELEASE ORAL at 06:41

## 2019-10-10 RX ADMIN — LEVETIRACETAM 750 MILLIGRAM(S): 250 TABLET, FILM COATED ORAL at 17:05

## 2019-10-10 RX ADMIN — Medication 3 MILLILITER(S): at 09:40

## 2019-10-10 RX ADMIN — LACTULOSE 10 GRAM(S): 10 SOLUTION ORAL at 15:21

## 2019-10-10 RX ADMIN — Medication 250 MILLIGRAM(S): at 06:41

## 2019-10-10 RX ADMIN — Medication 10 MILLIGRAM(S): at 22:18

## 2019-10-10 RX ADMIN — ATENOLOL 50 MILLIGRAM(S): 25 TABLET ORAL at 06:41

## 2019-10-10 RX ADMIN — ENOXAPARIN SODIUM 40 MILLIGRAM(S): 100 INJECTION SUBCUTANEOUS at 11:01

## 2019-10-10 RX ADMIN — Medication 3 MILLILITER(S): at 15:33

## 2019-10-10 RX ADMIN — AMLODIPINE BESYLATE 10 MILLIGRAM(S): 2.5 TABLET ORAL at 06:41

## 2019-10-10 RX ADMIN — Medication 10 MILLIGRAM(S): at 17:57

## 2019-10-10 RX ADMIN — LEVETIRACETAM 750 MILLIGRAM(S): 250 TABLET, FILM COATED ORAL at 06:41

## 2019-10-10 RX ADMIN — Medication 81 MILLIGRAM(S): at 11:01

## 2019-10-10 RX ADMIN — Medication 250 MILLIGRAM(S): at 17:05

## 2019-10-10 RX ADMIN — Medication 3 MILLILITER(S): at 20:34

## 2019-10-10 RX ADMIN — LISINOPRIL 10 MILLIGRAM(S): 2.5 TABLET ORAL at 07:41

## 2019-10-10 RX ADMIN — Medication 40 MILLIGRAM(S): at 06:42

## 2019-10-10 NOTE — CONSULT NOTE ADULT - ASSESSMENT
Former smoker  Possible underlying COPD given smoking hx  H/o CAD s/p stent  Given elevated BNP with b/l GGO which could indicate pulm edema, CHF is of concern  Pt reports h/o VHD; ? AI  Obese  Hypoxic  S/p respiratory failure requiring BiPAP therapy; now on NCO2    Rec:    Drug nebs for suspected COPD given smoking hx  Empiric abx for possible pneumonia though denies cough, fevers, and does not have leukocytosis  Continue diuresis as tolerates  Optimize cardiac function  Cardiology f/u  Observe off of steroids for now as pt without B-spasm on exam  Consider outpt PSG for possible COLLEEN  Weight loss  PFTs as outpt   Pulm f/u after d/c    d/w med
59 year old male with abdominal pain that seemed to resolve after an episode of emesis. hemodynamically normal. imaging suspicious for ileus vs small bowel obstruction. given passage of flatus, likely partial small bowel obstruction    -recommend NPO/IVf  -bowel rest  -serial abdominal exams  -avoid narcotics  -encourage ambulation  -monitor for bowel function  -rest of care as per primary team

## 2019-10-10 NOTE — PROGRESS NOTE ADULT - ASSESSMENT
Former smoker  Possible underlying COPD given smoking hx  H/o CAD s/p stent  VHD; ? AI  HTN - diastolic HD  Transient CHF   Obese  Hypoxia - resolved  S/p respiratory failure requiring BiPAP therapy; now off NCO2    Rec:    Drug nebs for suspected COPD given smoking hx - to LABA/ICS on DC  Empiric abx for possible pneumonia - consider Zpak alone  Continue diuresis as tolerates  Optimize cardiac function  Cardiology f/u  Observe off of steroids for now as pt without B-spasm on exam  Need outpt f/u of ct  Weight loss  PFTs and sleep studt as outpt   Pulm f/u after d/c Former smoker  Possible underlying COPD given smoking hx  H/o CAD s/p stent  VHD;  AI (moderate)  HTN - diastolic HD  Transient CHF   Obese  Hypoxia - resolved  S/p respiratory failure requiring BiPAP therapy; now off NCO2    Rec:    Drug nebs for suspected COPD given smoking hx - to LABA/ICS on DC  Empiric abx for possible pneumonia - consider oral Levaquin to complete 7 day course at most  Continue diuresis as tolerates  Optimize cardiac function  Cardiology f/u  Observe off of steroids for now as pt without B-spasm on exam  Need outpt f/u of ct  Weight loss  PFTs and sleep studt as outpt   Pulm f/u after d/c  No objection to D/C

## 2019-10-10 NOTE — CONSULT NOTE ADULT - SUBJECTIVE AND OBJECTIVE BOX
[facility]                                                    [address], [address], [place] [address]                                                         PHONE: [phone]    FAX: [phone] [address], [address], [place] [address]                                                 PHONE: [phone]    FAX: [phone]  *******************************************************************************  cc: SOB    HPI: 58yo M presented with SOB. Inability to compete sentences. Symptoms have been progressive. Cough without sputum production.   Past MI with coronary stenting 1998. PAD. Thoracic aortic aneurysm repair for dissection with hx of CVA with left sided sensoring loss. Moderate aortic insufficiency with dilated aortic root.     [place]/PVR 6/6/18 R 1.17. L 1/28  Echo 2/23/19  EF 45%, basilar and inferior septus, basal and mid inferior wall and basal and mid IL hypokinesis. Mild to mod MR/AR, Tr TR. dlation of the aortic root and ascending aorta measuring 4.5 and 4.1cm repsoectively  Carotid 2/23/19 16-49% bilat      Overnight events/Subjective Assessment:    INTERPRETATION OF TELEMETRY (personally reviewed):    PAST MEDICAL & SURGICAL HISTORY:  CVA (cerebral vascular accident)  CHF (congestive heart failure)  H/O colectomy  Aorta disorder      penicillin (Unknown)      MEDICATIONS  (STANDING):  ALBUTerol/ipratropium for Nebulization 3 milliLiter(s) Nebulizer every 6 hours  amLODIPine   Tablet 10 milliGRAM(s) Oral daily  aspirin enteric coated 81 milliGRAM(s) Oral daily  ATENolol  Tablet 50 milliGRAM(s) Oral daily  atorvastatin 10 milliGRAM(s) Oral at bedtime  enoxaparin Injectable 40 milliGRAM(s) SubCutaneous daily  furosemide   Injectable 40 milliGRAM(s) IV Push daily  levETIRAcetam 750 milliGRAM(s) Oral two times a day  levoFLOXacin IVPB      levoFLOXacin IVPB 750 milliGRAM(s) IV Intermittent every 24 hours  lisinopril 10 milliGRAM(s) Oral daily  pantoprazole    Tablet 40 milliGRAM(s) Oral before breakfast  saccharomyces boulardii 250 milliGRAM(s) Oral two times a day    MEDICATIONS  (PRN):  zolpidem 5 milliGRAM(s) Oral at bedtime PRN Insomnia      Vital Signs Last 24 Hrs  T(C): 36.4 (10 Oct 2019 07:33), Max: 36.8 (09 Oct 2019 16:04)  T(F): 97.6 (10 Oct 2019 07:33), Max: 98.3 (09 Oct 2019 16:04)  HR: 77 (10 Oct 2019 07:33) (58 - 98)  BP: 166/75 (10 Oct 2019 07:33) (159/80 - 181/75)  BP(mean): --  RR: 19 (10 Oct 2019 07:33) (18 - 19)  SpO2: 96% (10 Oct 2019 03:50) (94% - 97%)    I&O's Detail    I&O's Summary          PHYSICAL EXAM:  General: Appears well developed, well nourished, no acute distress. not in acute pain  HEAD: normal cephalic. Atraumatic  PUPILS: equal and reactive to light  EARS: normal hearing  NECK: supple. no JVD or HJR. no carotid bruits. no visible lymphadenopathy  NOSE: no gross abnormalities  CHEST: symmetric chest wall expansion  CARDIOVASCULAR: Normal rate. Regular rhythm. Normal S1 and S2, no S3/S4,  no murmur, rub, or gallop  LUNGS: Normal effort. Normal respiratory rate. Breath sounds are clear to auscultation bilaterall. No respiratory distress. No stridor.  no rales, rhonchi or wheeze. no decreased Breath sounds  ABDOMEN: Soft, nontender, non-distended, positive bowel sounds, no mass or bruit. no abdominal tenderness. No rebound. no ascites  EXTREMITIES: No clubbing, cyanosis or edema. normal range of motion  PULSES:  distal pulses WNL  SKIN: Warm and dry with normal turgor. no visible rash or cyanosis   NEURO: Alert & oriented x 3, grossly intact with no focal weakness  PSYCH: normal mood and affect. Grossly normal insight and judgement exhibited    FAMILY HISTORY: Alcoholism. No family hx of ischemic heart disease mother, father or 1st degree relatives.      SOCIAL HISTORY:   former smoking and cocaine, past social ETOH. Repos quit all in 2001    REVIEW OF SYSTEMS:  Constitutional: no fever, chills or malaise. No weight loss  Head: no trauma  Eyes: no visual deficit. No double vision  Ears: no hearing deficit or ringing in the ears  Nose: no nose bleeds or smell changes or congestion  Throat: no difficult swallowing or painful swallowing  Neck: supple. No lymphadenopathy or swelling  Respiratory: no SOB, wheeze, asthma, COPD. No cough. No blood in the sputum  Cardiovascular: no CP, palpitations, irregular heart beats. No edema. No PND. No orthopnea. No skin/temperature or color changes  Gastrointestinal: no abdominal pain. No constipation. No diarrhea. No melena. No nausea. No vomiting. No bloating  Genitourinary: no frequency or urgency. No hematuria  Lymphatics: no grossly swollen lymph nodes  Musculoskeletal: no limitation of range of motion. Normal strength. No pain  Integumentary: no visible rash. No itching  Neurologic: no HA. No TIA or stroke symptoms. No seizure. No hx of epilepsy. No tingling or numbness. No weakness. No dizziness  Psychiatric: denied. Reports appropriate mood.        LABS:                        15.0   10.58 )-----------( 213      ( 09 Oct 2019 07:27 )             46.2     10-09    145  |  101  |  25.0<H>  ----------------------------<  91  3.4<L>   |  26.0  |  1.18    Ca    9.4      09 Oct 2019 07:27    TPro  7.9  /  Alb  4.1  /  TBili  0.4  /  DBili  x   /  AST  23  /  ALT  19  /  AlkPhos  68  10-08    CARDIAC MARKERS ( 08 Oct 2019 13:08 )  x     / <0.01 ng/mL / x     / x     / x          PT/INR - ( 08 Oct 2019 13:08 )   PT: 12.3 sec;   INR: 1.07 ratio         PTT - ( 08 Oct 2019 13:08 )  PTT:31.1 sec  Serum Pro-Brain Natriuretic Peptide: 1310 pg/mL (10-08 @ 13:08)  serum  Lipids:         RADIOLOGY & ADDITIONAL STUDIES:    ECG:    ECHO:    STRESS TEST:    CARDIAC CATHETERIZATION:    ASSESSMENT AND PLAN:  In summary, JONATHAN GIBSON is a 59y Male with past medical history significant for       Katie Sin MD [facility]                                                    [address], [address], [place] [address]                                                         PHONE: [phone]    FAX: [phone] [address], [address], [place] [address]                                                 PHONE: [phone]    FAX: [phone]  *******************************************************************************  cc: SOB    HPI: 58yo M presented with SOB. Inability to compete sentences. Symptoms were new in onset on morning of presentation. Pt reports he woke up with the symptoms. Pt reports a Cough without sputum production for several days prior. Pt denies fever, chills or constitutional symptoms. No chest pain. No palpitations. No PND or orthopnea reported as symptoms were sudden in onset. No dizziness or syncope.   Past MI with coronary stenting 1998. PAD. Thoracic aortic aneurysm repair for acute dissection with hx of CVA with left sided sensory loss 2001 in light of cocaine use. Moderate aortic insufficiency with dilated aortic root. No hx of COPD.    [place]/PVR 6/6/18 R 1.17. L 1/28  Echo 2/23/19  EF 45%, basilar and inferior septus, basal and mid inferior wall and basal and mid IL hypokinesis. Mild to mod MR/AR, Tr TR. dilation of the aortic root and ascending aorta measuring 4.5 and 4.1cm respectively  Carotid 2/23/19 16-49% bilat  Nuclear stress test 2/26/18 fixed IPL defect. EF 64%. No change from nuclear stress test 9/10/15      Overnight events/Subjective Assessment: resolution of SOB. Laying flat.    INTERPRETATION OF TELEMETRY (personally reviewed): SR    PAST MEDICAL & SURGICAL HISTORY:  CVA (cerebral vascular accident)  CHF (congestive heart failure)  H/O colectomy  Aorta disorder      penicillin (Unknown)      MEDICATIONS  (STANDING):  ALBUTerol/ipratropium for Nebulization 3 milliLiter(s) Nebulizer every 6 hours  amLODIPine   Tablet 10 milliGRAM(s) Oral daily  aspirin enteric coated 81 milliGRAM(s) Oral daily  ATENolol  Tablet 50 milliGRAM(s) Oral daily  atorvastatin 10 milliGRAM(s) Oral at bedtime  enoxaparin Injectable 40 milliGRAM(s) SubCutaneous daily  furosemide   Injectable 40 milliGRAM(s) IV Push daily  levETIRAcetam 750 milliGRAM(s) Oral two times a day  levoFLOXacin IVPB      levoFLOXacin IVPB 750 milliGRAM(s) IV Intermittent every 24 hours  lisinopril 10 milliGRAM(s) Oral daily  pantoprazole    Tablet 40 milliGRAM(s) Oral before breakfast  saccharomyces boulardii 250 milliGRAM(s) Oral two times a day    MEDICATIONS  (PRN):  zolpidem 5 milliGRAM(s) Oral at bedtime PRN Insomnia      Vital Signs Last 24 Hrs  T(C): 36.4 (10 Oct 2019 07:33), Max: 36.8 (09 Oct 2019 16:04)  T(F): 97.6 (10 Oct 2019 07:33), Max: 98.3 (09 Oct 2019 16:04)  HR: 77 (10 Oct 2019 07:33) (58 - 98)  BP: 166/75 (10 Oct 2019 07:33) (159/80 - 181/75)  BP(mean): --  RR: 19 (10 Oct 2019 07:33) (18 - 19)  SpO2: 96% (10 Oct 2019 03:50) (94% - 97%)    I&O's Detail    I&O's Summary          PHYSICAL EXAM:  General: Appears well developed, well nourished, no acute distress. not in acute pain. overweight body habitus  HEAD: normal cephalic. Atraumatic  PUPILS: equal and reactive to light  EARS: normal hearing  NECK: supple. no JVD or HJR. no carotid bruits. no visible lymphadenopathy  NOSE: no gross abnormalities  CHEST: symmetric chest wall expansion  CARDIOVASCULAR: Normal rate. Regular rhythm. Normal S1 and S2, no S3/S4,  no murmur, rub, or gallop  LUNGS: Normal effort. Normal respiratory rate. Breath sounds are clear to auscultation except for mild crackles left lung base. No respiratory distress. No stridor.  no rales, rhonchi or wheeze. no decreased Breath sounds  ABDOMEN: Soft, nontender, non-distended, positive bowel sounds, no mass or bruit. no abdominal tenderness. No rebound. no ascites  EXTREMITIES: No clubbing, cyanosis or edema. normal range of motion  PULSES:  distal pulses WNL  SKIN: Warm and dry with normal turgor. no visible rash or cyanosis   NEURO: Alert & oriented x 3, grossly intact with no focal weakness  PSYCH: normal mood and affect. Grossly normal insight and judgement exhibited    FAMILY HISTORY: Alcoholism both parents.  No family hx of ischemic heart disease mother, father or 1st degree relatives.      SOCIAL HISTORY:   former smoking and cocaine, past social ETOH. Reports quit all in 2001    REVIEW OF SYSTEMS:  Constitutional: no fever, chills or malaise. No weight loss  Head: no trauma  Eyes: no visual deficit. No double vision  Ears: no hearing deficit or ringing in the ears  Nose: no nose bleeds or smell changes or congestion  Throat: no difficult swallowing or painful swallowing  Neck: supple. No lymphadenopathy or swelling  Respiratory: + SOB, no wheeze, asthma, COPD. No cough. No blood in the sputum  Cardiovascular: no CP, palpitations, irregular heart beats. No edema. No PND. No orthopnea. No skin/temperature or color changes  Gastrointestinal: no abdominal pain. No constipation. No diarrhea. No melena. No nausea. No vomiting. No bloating  Genitourinary: no frequency or urgency. No hematuria  Lymphatics: no grossly swollen lymph nodes  Musculoskeletal: no limitation of range of motion. Normal strength. No pain  Integumentary: no visible rash. No itching  Neurologic: no HA. No TIA or stroke symptoms. No seizure. No hx of epilepsy. No tingling or numbness. No weakness. No dizziness  Psychiatric: denied. Reports appropriate mood.        LABS:                        15.0   10.58 )-----------( 213      ( 09 Oct 2019 07:27 )             46.2     10-09    145  |  101  |  25.0<H>  ----------------------------<  91  3.4<L>   |  26.0  |  1.18    Ca    9.4      09 Oct 2019 07:27    TPro  7.9  /  Alb  4.1  /  TBili  0.4  /  DBili  x   /  AST  23  /  ALT  19  /  AlkPhos  68  10-08    CARDIAC MARKERS ( 08 Oct 2019 13:08 )  x     / <0.01 ng/mL / x     / x     / x          PT/INR - ( 08 Oct 2019 13:08 )   PT: 12.3 sec;   INR: 1.07 ratio         PTT - ( 08 Oct 2019 13:08 )  PTT:31.1 sec  Serum Pro-Brain Natriuretic Peptide: 1310 pg/mL (10-08 @ 13:08)  serum  Lipids:         RADIOLOGY & ADDITIONAL STUDIES:    ECG: SR LVH LAE. QwIII. Diffuse ST abnormality anteriorly (10/8/19)    < from: CT Chest No Cont (10.08.19 @ 16:04) >  IMPRESSION:     Diffuse groundglass prominence noted throughout the right lung consistent   with diffuse pneumonia.    Small bilateral pleural effusions.    Left basilar atelectasis.    Interval development of an aneurysm at the level of the distal aortic   arch and proximal descending thoracic aorta measuring 4.1 cm and 4.3 cm   respectively since 1/19/2010. Cannot evaluate the lumen of the aorta   without IV contrast.    < end of copied text >    < from: Xray Chest 1 View- PORTABLE-Urgent (10.08.19 @ 13:15) >  FINDINGS:    The lungs  show ill-defined bilateral lung peripheral opacities and a   linear RIGHT perihilar and fibrosis. UNDERLYING PULMONARY NODULES CANNOT   BE EXCLUDED AND A CT SCAN RECOMMENDED FOR FURTHER CLARIFICATION.    The heart and mediastinum are within normal limits.    Visualized osseous structures are intact.    < end of copied text >      ASSESSMENT AND PLAN:  In summary, JONATHAN GIBSON is a 59y Male with past medical history significant for SOB. Inability to compete sentences. Symptoms were new in onset on morning of presentation. Pt reports he woke up with the symptoms. Pt reports a Cough without sputum production for several days prior. Pt denies fever, chills or constitutional symptoms. No chest pain. No palpitations. No PND or orthopnea reported as symptoms were sudden in onset. No dizziness or syncope.   Past MI with coronary stenting 1998. PAD. Thoracic aortic aneurysm repair for dissection with hx of CVA with left sided sensory loss 2001 in light of cocaine use. Moderate aortic insufficiency with dilated aortic root. No hx of COPD.    [place]/PVR 6/6/18 R 1.17. L 1/28  Echo 2/23/19  EF 45%, basilar and inferior septus, basal and mid inferior wall and basal and mid IL hypokinesis. Mild to mod MR/AR, Tr TR. dilation of the aortic root and ascending aorta measuring 4.5 and 4.1cm respectively  Carotid 2/23/19 16-49% bilat  Nuclear stress test 2/26/18 fixed IPL defect. EF 64%. No change from nuclear stress test 9/10/15    - SOB. Acute in onset. Right lung ground glass prominence suggestive of PNA with small bilateral pleural effusions. Pt denies fever, chills or constitutional symptoms. ABX as per medical. Still concern for underlying combined acute systolic and diastolic CHF. Symptoms responded to lasix 40mg daily IV. Will maintain for now.    - CAD. Past MI with coronary stenting 1998. Abnormal ECG. CE negative x 1. Will pursue serial cardiac enzymes. Repeat ECG. Pt denies active chest pain. maintain ASA for now. Low thershold for cardiac catheterization    - Thoracic aortic aneurysm repair following acute dissection 2001 in light of cocaine use at the time, since discontinued. Chest CT this presentation with Interval development of an aneurysm at the level of the distal aortic arch and proximal descending thoracic aorta measuring 4.1 cm and 4.3 cm respectively since 1/19/2010. Cannot evaluate the lumen of the aorta without IV contrast. Will order chest CT with contrast to better evaluate the lumen    - HTN. amlodipine 10mg daily, atenolol 50mg daily    - HL atorvastatin 10mg daily    - Echocardiogram ordered and pending    - PNA. ABX per medical    - Telemetry monitoring personally reviewed by me    - ECG personally reviewed by me    - Echocardiogram ordered    - radiologic imaging reviewed    - Laboratory data reviewed.    - I have personally reviewed all obtainable prior records and data including office records    we will follow with you    Katie Sin MD

## 2019-10-10 NOTE — PROGRESS NOTE BEHAVIORAL HEALTH - RISK ASSESSMENT
Patient does take excessive tyenol PM every night for last 10 years but not as suicide attempt, also has panic attacks and anxiety triggered by medical symptoms, depressed mood and isolative, however no h/o prior suicide attempt, has some support, takes care of dog, no psychotic, has maintained sobriety of cocaine and other substances for 10 years, reports willingness to engage with outpatient tx and consider other medication options.

## 2019-10-10 NOTE — PROGRESS NOTE BEHAVIORAL HEALTH - AFFECT RANGE
"              After Visit Summary   4/23/2018    Dena Cedeno    MRN: [id number]           Patient Information     Date Of Birth          1949        Visit Information        Provider Department      4/23/2018 8:45 AM Delaney Miller MD [facility], [place]        Today's Diagnoses     Diarrhea, unspecified type    -  1       Follow-ups after your visit        Follow-up notes from your care team     Return if symptoms worsen or fail to improve.      Future tests that were ordered for you today     Open Future Orders        Priority Expected Expires Ordered    Clostridium difficile toxin B PCR Routine  4/23/2019 4/23/2018    Fecal Lactoferrin Routine  4/23/2019 4/23/2018    Ova and Parasite Screen Routine  4/23/2019 4/23/2018    Stool culture SSCE Routine  4/23/2019 4/23/2018            Who to contact     If you have questions or need follow up information about today's clinic visit or your schedule please contact [facility] directly at [phone].  Normal or non-critical lab and imaging results will be communicated to you by MyChart, letter or phone within 4 business days after the clinic has received the results. If you do not hear from us within 7 days, please contact the clinic through [employer] or phone. If you have a critical or abnormal lab result, we will notify you by phone as soon as possible.  Submit refill requests through [employer] or call your pharmacy and they will forward the refill request to us. Please allow 3 business days for your refill to be completed.          Additional Information About Your Visit        MyChart Information     [employer] lets you send messages to your doctor, view your test results, renew your prescriptions, schedule appointments and more. To sign up, go to [email or web address] . Click on \"Log in\" on the left side of the screen, which will take you to the Welcome page. Then click on \"Sign up Now\" on the right side of the page.     You will be " "asked to enter the access code listed below, as well as some personal information. Please follow the directions to create your username and password.     Your access code is: [email or web address]  Expires: 6/10/2018  4:58 PM     Your access code will  in 90 days. If you need help or a new code, please call your [facility] clinic or [phone].        Care EveryWhere ID     This is your Care EveryWhere ID. This could be used by other organizations to access your [facility] medical records  [id number]        Your Vitals Were     Pulse Temperature Respirations Height Pulse Oximetry BMI (Body Mass Index)    61 96.1  F (35.6  C) (Tympanic) 16 5' 4.5\" (1.638 m) 98% 19.5 kg/m2       Blood Pressure from Last 3 Encounters:   18 110/62   18 100/66   11/10/17 112/62    Weight from Last 3 Encounters:   18 115 lb 6.4 oz (52.3 kg)   18 118 lb (53.5 kg)   11/10/17 116 lb 12.8 oz (53 kg)               Primary Care Provider Office Phone # Fax #    Delaney Miller [phone] [address] [facility] [address]        Equal Access to Services     [facility]: Hadii paulie michaud hadasho Soaleksey, waaxda luqadaha, qaybta kaalmada adeegyada, elizabeth jones . So [facility] [phone].    ATENCIÓN: Si habla español, tiene a mon disposición servicios gratuitos de asistencia lingüística. Llame al [phone].    We comply with applicable federal civil rights laws and [place] laws. We do not discriminate on the basis of race, color, national origin, age, disability, sex, sexual orientation, or gender identity.            Thank you!     Thank you for choosing [facility]  for your care. Our goal is always to provide you with excellent care. Hearing back from our patients is one way we can continue to improve our services. Please take a few minutes to complete the written survey that you may receive in the mail after your visit with us. Thank you!      "        Your Updated Medication List - Protect others around you: Learn how to safely use, store and throw away your medicines at www.disposemymeds.org.          This list is accurate as of 4/23/18 12:08 PM.  Always use your most recent med list.                   Brand Name Dispense Instructions for use Diagnosis    CALCIUM 600 PO      Take 2 tablets by mouth daily        cetirizine 10 MG tablet    zyrTEC     Take 10 mg by mouth daily as needed        ESTRADIOL 0.1MG/GM CREAM     42 g    Insert 1 gram vaginally 2 times per week    Vaginal atrophy       fish Oil 1200 MG capsule      Take 1 capsule by mouth daily        fluticasone 50 MCG/ACT spray    FLONASE    16 g    USE ONE TO TWO SPRAY(S) IN EACH NOSTRIL ONCE DAILY    Acute sinusitis with symptoms > 10 days       LORazepam 1 MG tablet    ATIVAN    135 tablet    Take 1.5 tablets (1.5 mg) by mouth daily as needed    Anxiety       magnesium 250 MG tablet      Take 1 tablet by mouth 2 times daily        order for DME     1 Device    Equipment being ordered: thigh high compression stocking, 15-20 mmHg    Varicose veins of left lower extremity       PRESERVISION AREDS PO      Take 2 capsules by mouth daily        VITAMIN D3 PO      Take 4,000 Units by mouth daily           Full

## 2019-10-10 NOTE — CONSULT NOTE ADULT - SUBJECTIVE AND OBJECTIVE BOX
59 year old male admitted to medicine for treatment of pneumonia. Complaining of abdominal pain for two days.       MEDICATIONS  (STANDING):  ALBUTerol/ipratropium for Nebulization 3 milliLiter(s) Nebulizer every 6 hours  amLODIPine   Tablet 5 milliGRAM(s) Oral daily  aspirin  chewable 81 milliGRAM(s) Oral daily  ATENolol  Tablet 50 milliGRAM(s) Oral daily  enoxaparin Injectable 40 milliGRAM(s) SubCutaneous daily  furosemide    Tablet 40 milliGRAM(s) Oral daily  levETIRAcetam  IVPB 750 milliGRAM(s) IV Intermittent every 12 hours  levoFLOXacin IVPB 750 milliGRAM(s) IV Intermittent every 24 hours  levoFLOXacin IVPB      metoclopramide Injectable 10 milliGRAM(s) IV Push every 8 hours  pantoprazole  Injectable 40 milliGRAM(s) IV Push daily    MEDICATIONS  (PRN):      Vital Signs Last 24 Hrs  T(C): 36.8 (11 Oct 2019 07:26), Max: 36.9 (10 Oct 2019 23:22)  T(F): 98.3 (11 Oct 2019 07:26), Max: 98.5 (10 Oct 2019 23:22)  HR: 78 (11 Oct 2019 07:26) (56 - 80)  BP: 147/72 (11 Oct 2019 07:26) (133/67 - 147/78)  BP(mean): --  RR: 17 (11 Oct 2019 07:26) (17 - 18)  SpO2: 95% (11 Oct 2019 07:26) (94% - 98%)    Physical Exam:    Neurological:  No sensory/motor deficits    HEENT: PERRLA, EOMI, no drainage or redness    Neck: No bruits; no thyromegaly or nodules,  No JVD    Back: Normal spine flexure, No CVA tenderness, No deformity or limitation of movement    Respiratory: Breath Sounds equal & clear to auscultation, no accessory muscle use    Cardiovascular: Regular rate & rhythm, normal S1, S2; no murmurs, gallops or rubs    Gastrointestinal: Soft, non-tender, normal bowel sounds    Extremities: No peripheral edema, No cyanosis, clubbing     Vascular: Equal and normal pulses: 2+ peripheral pulses throughout    Musculoskeletal: No joint pain, swelling or deformity; no limitation of movement    Skin: No rashes      I&O's Detail      LABS:                        16.3   12.40 )-----------( 272      ( 11 Oct 2019 08:33 )             48.8     10-11    137  |  95<L>  |  44.0<H>  ----------------------------<  97  3.5   |  22.0  |  2.05<H>    Ca    9.9      11 Oct 2019 08:27  Phos  5.6     10-11  Mg     2.1     10-11            RADIOLOGY & ADDITIONAL STUDIES: 59 year old male admitted to medicine for treatment of pneumonia. Complaining of diffuse abdominal pain for two days. nonradiating and not related to food intake. After having one episode of emesis. his abdominal pain was significantly improved. he is passing flatus but has not had a bowel movement since he has been here.     ROS: denies fevers, chills, chest pain, shortness of breath, palpitations, dysuria, blood per rectum    PMH: HTN, CAD s/p stent, thoracic aortic dissection, CVA  PSH: s/p colectomy with ileostomy, s/p ileostomy revision, open thoracic aortic dissection repair  Meds: per med rec  Allergies: NKDA  FH: noncontributory  SH: denies toxic habit      MEDICATIONS  (STANDING):  ALBUTerol/ipratropium for Nebulization 3 milliLiter(s) Nebulizer every 6 hours  amLODIPine   Tablet 5 milliGRAM(s) Oral daily  aspirin  chewable 81 milliGRAM(s) Oral daily  ATENolol  Tablet 50 milliGRAM(s) Oral daily  enoxaparin Injectable 40 milliGRAM(s) SubCutaneous daily  furosemide    Tablet 40 milliGRAM(s) Oral daily  levETIRAcetam  IVPB 750 milliGRAM(s) IV Intermittent every 12 hours  levoFLOXacin IVPB 750 milliGRAM(s) IV Intermittent every 24 hours  levoFLOXacin IVPB      metoclopramide Injectable 10 milliGRAM(s) IV Push every 8 hours  pantoprazole  Injectable 40 milliGRAM(s) IV Push daily    MEDICATIONS  (PRN):      Vital Signs Last 24 Hrs  T(C): 36.8 (11 Oct 2019 07:26), Max: 36.9 (10 Oct 2019 23:22)  T(F): 98.3 (11 Oct 2019 07:26), Max: 98.5 (10 Oct 2019 23:22)  HR: 78 (11 Oct 2019 07:26) (56 - 80)  BP: 147/72 (11 Oct 2019 07:26) (133/67 - 147/78)  BP(mean): --  RR: 17 (11 Oct 2019 07:26) (17 - 18)  SpO2: 95% (11 Oct 2019 07:26) (94% - 98%)    Physical Exam:    General: no acute distress, AOx3  Respiratory: Breath Sounds equal & clear to auscultation, no accessory muscle use  Cardiovascular: Regular rate & rhythm, normal S1, S2; no murmurs, gallops or rubs  Gastrointestinal: Soft, non-tender,nondistended normal bowel sounds  DIONE: no gross blood per rectum, no stool in rectal vault, no masses  Extremities: No peripheral edema, No cyanosis, clubbing   Vascular: Equal and normal pulses: 2+ peripheral pulses throughout  Musculoskeletal: No joint pain, swelling or deformity; no limitation of movement    Skin: No rashes      I&O's Detail      LABS:                        16.3   12.40 )-----------( 272      ( 11 Oct 2019 08:33 )             48.8     10-11    137  |  95<L>  |  44.0<H>  ----------------------------<  97  3.5   |  22.0  |  2.05<H>    Ca    9.9      11 Oct 2019 08:27  Phos  5.6     10-11  Mg     2.1     10-11            RADIOLOGY & ADDITIONAL STUDIES:    CTA: Status post repair of a type A aortic dissection with the dissection flap   extending into a common origin of the right brachiocephalic and left   common carotid arteries and into the left subclavian artery; comparison   with prior preoperative chest CT 1/19/2010 is limited in the region of   the great vessels, however dissection flap appears to extend into the   right brachiocephalic and left subclavian arteries on the prior study.    Dissection flap extends into the proximal left common iliac and right   external iliac arteries; distal extent of the dissection flap has   increased since 1/19/2010.    Dilated distal aortic arch/proximal descending thoracic aorta measuring   4.2 cm.    Status post colectomy with dilated loops of small bowel with collapse of   the distal most aspect of the small bowel proximal to the rectal   anastomosis, possibly an obstruction or ileus; correlation is recommended   with the patient's symptoms.    Interval improvement in bilateral groundglass opacities in the lungs,   possibly improving pulmonary edema.    Comparison is recommended with a prior postoperative CT   chest/abdomen/pelvis if available to assess for stability of the   dissection.

## 2019-10-10 NOTE — DISCHARGE NOTE PROVIDER - NSDCCPCAREPLAN_GEN_ALL_CORE_FT
PRINCIPAL DISCHARGE DIAGNOSIS  Diagnosis: CHF exacerbation  Assessment and Plan of Treatment:       SECONDARY DISCHARGE DIAGNOSES  Diagnosis: CAD (coronary artery disease)  Assessment and Plan of Treatment:     Diagnosis: Moderate episode of recurrent major depressive disorder  Assessment and Plan of Treatment:     Diagnosis: Acute hypoxemic respiratory failure  Assessment and Plan of Treatment: PRINCIPAL DISCHARGE DIAGNOSIS  Diagnosis: CHF exacerbation  Assessment and Plan of Treatment: Resume your home cardiac medications.      SECONDARY DISCHARGE DIAGNOSES  Diagnosis: Ileus  Assessment and Plan of Treatment: Follow up with your gastroenterologist for further management.    Diagnosis: Pneumonia  Assessment and Plan of Treatment: The course of antibiotics was completed.    Diagnosis: CAD (coronary artery disease)  Assessment and Plan of Treatment: Resume your home cardiac medications.

## 2019-10-10 NOTE — PROGRESS NOTE ADULT - SUBJECTIVE AND OBJECTIVE BOX
PULMONARY PROGRESS NOTE      [facility]ONPatient[id number]    Patient is a 59y old  Male who presents with a chief complaint of SOB (08 Oct 2019 18:29)      INTERVAL HPI/OVERNIGHT EVENTS: Feeling much better. Denies any sob or cough now. Says he feels great.   Not using nocturnal BiPAP    MEDICATIONS  (STANDING):  ALBUTerol/ipratropium for Nebulization 3 milliLiter(s) Nebulizer every 6 hours  amLODIPine   Tablet 10 milliGRAM(s) Oral daily  aspirin enteric coated 81 milliGRAM(s) Oral daily  ATENolol  Tablet 50 milliGRAM(s) Oral daily  atorvastatin 10 milliGRAM(s) Oral at bedtime  enoxaparin Injectable 40 milliGRAM(s) SubCutaneous daily  furosemide   Injectable 40 milliGRAM(s) IV Push daily  levETIRAcetam 750 milliGRAM(s) Oral two times a day  levoFLOXacin IVPB      levoFLOXacin IVPB 750 milliGRAM(s) IV Intermittent every 24 hours  lisinopril 10 milliGRAM(s) Oral daily  pantoprazole    Tablet 40 milliGRAM(s) Oral before breakfast  potassium chloride    Tablet ER 40 milliEquivalent(s) Oral once  saccharomyces boulardii 250 milliGRAM(s) Oral two times a day      MEDICATIONS  (PRN):      Allergies    penicillin (Unknown)    Intolerances        PAST MEDICAL & SURGICAL HISTORY:  CVA (cerebral vascular accident)  CHF (congestive heart failure)  H/O colectomy  Aorta disorder          REVIEW OF SYSTEMS:    CONSTITUTIONAL:  No distress    HEENT:  Eyes:  No diplopia or blurred vision. ENT:  No earache, sore throat or runny nose.    CARDIOVASCULAR:  No pressure, squeezing, tightness, heaviness or aching about the chest; no palpitations.    RESPIRATORY:  per HPI     GASTROINTESTINAL:  No nausea, vomiting or diarrhea.    GENITOURINARY:  No dysuria, frequency or urgency.    NEUROLOGIC:  No paresthesias, fasciculations, seizures or weakness.    PSYCHIATRIC:  No disorder of thought or mood.    Vital Signs Last 24 Hrs  T(C): 36.4 (09 Oct 2019 07:37), Max: 36.8 (09 Oct 2019 05:17)  T(F): 97.6 (09 Oct 2019 07:37), Max: 98.2 (09 Oct 2019 05:17)  HR: 74 (09 Oct 2019 08:43) (65 - 79)  BP: 160/68 (09 Oct 2019 07:37) (153/61 - 165/70)  BP(mean): --  RR: 18 (09 Oct 2019 07:37) (18 - 20)  SpO2: 95% (09 Oct 2019 08:43) (95% - 99%)    PHYSICAL EXAMINATION:    GENERAL: The patient is awake and alert in no apparent distress.     HEENT: Head is normocephalic and atraumatic.  Mucous membranes are moist.    NECK: Supple.    LUNGS: rales at bases, no wheeze,  respirations unlabored    HEART: Regular rate and rhythm     ABDOMEN: Soft, nontender,  obese    EXTREMITIES: Without any cyanosis, clubbing, rash, lesions or edema.    NEUROLOGIC: Grossly intact.    LABS:                        15.0   10.58 )-----------( 213      ( 09 Oct 2019 07:27 )             46.2     10-09    145  |  101  |  25.0<H>  ----------------------------<  91  3.4<L>   |  26.0  |  1.18    Ca    9.4      09 Oct 2019 07:27    TPro  7.9  /  Alb  4.1  /  TBili  0.4  /  DBili  x   /  AST  23  /  ALT  19  /  AlkPhos  68  10-08    PT/INR - ( 08 Oct 2019 13:08 )   PT: 12.3 sec;   INR: 1.07 ratio         PTT - ( 08 Oct 2019 13:08 )  PTT:31.1 sec      CARDIAC MARKERS ( 08 Oct 2019 13:08 )  x     / <0.01 ng/mL / x     / x     / x            Serum Pro-Brain Natriuretic Peptide: 1310 pg/mL (10-08-19 @ 13:08)           RADIOLOGY & ADDITIONAL STUDIES:  < from: CT Chest No Cont (10.08.19 @ 16:04) >     EXAM:  CT CHEST                          PROCEDURE DATE:  10/08/2019          INTERPRETATION:  CLINICAL INFORMATION: Pulmonary nodules.     COMPARISON: 1/19/2010    PROCEDURE:   CT of the Chest was performed without intravenous contrast.  [place] coronal reformats were performed.      FINDINGS:    No evidence of mediastinal, hilar, or axillary lymphadenopathy. No   abnormalities seen at the base of the neck.    Small bilateral pleural effusions. No evidence of a pericardial effusion.    Coronary artery calcifications noted. Mild cardio megaly.    The visualized upper abdomen, calcifications are noted in keeping with   chronic aortic dissection. This is consistent with the finding of an   acute aortic dissection noted on 1/19/2010. 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 The aorta at this site   measures 3.1 cm in diameter on 1/19/2010. The proximal descending   thoracic aorta measures 4.3 cm no evidence of periaortic hematoma.    Diffuse groundglass interstitial prominence noted in the right lung,   right lower lobe worst and left lower lobe. Left basilar atelectasis.    Post sternotomy changes. This is a new finding since 1/19/2010. Evidence   of prior surgery involving the ascending thoracic aorta in keeping with   the presence of ascending thoracic aortic dissection noted on the prior   study.    No chest wall abnormality.    IMPRESSION:     Diffuse groundglass prominence noted throughout the right lung consistent   with diffuse pneumonia.    Small bilateral pleural effusions.    Left basilar atelectasis.    Interval development of an aneurysm at the level of the distal aortic   arch and proximal descending thoracic aorta measuring 4.1 cm and 4.3 cm   respectively since 1/19/2010. Cannot evaluate the lumen of the aorta   without IV contrast.    PEDRO LUIS COLEMAN M.D., ATTENDING RADIOLOGIST        Echo on 10/9     1. Left ventricular ejection fraction, by visual estimation, is 55 to   60%.   2. Normal global left ventricular systolic function.   3. Mildly increased LV wall thickness.   4. Spectral Doppler shows impaired relaxation pattern of left   ventricular myocardial filling (Grade I diastolic dysfunction).   5. Normal right ventricular size and function.   6. There is no evidence of pericardial effusion.   7. Mild mitral annular calcification.   8. Thickening of the anterior and posterior mitral valve leaflets.   9. Moderate to severe aortic regurgitation.  10. Flow reversal seen in the descending aorta.  11. Ascending aorta measuring 3.8 cm.  12. May Consider MUSTAPHA for evaluation of Aortic regurgitation if clinically   indicated.  13. Discussed with Dr Sin.    MD Attila Electronically signed on 10/10/2019 at 9:11:05 AM PULMONARY PROGRESS NOTE      [facility]-[id number]    Patient is a 59y old  Male who presents with a chief complaint of SOB (08 Oct 2019 18:29)      INTERVAL HPI/OVERNIGHT EVENTS: Feeling much better. Denies any sob or cough now. Says he feels great.   Not using or requiring nocturnal BiPAP    MEDICATIONS  (STANDING):  ALBUTerol/ipratropium for Nebulization 3 milliLiter(s) Nebulizer every 6 hours  amLODIPine   Tablet 10 milliGRAM(s) Oral daily  aspirin enteric coated 81 milliGRAM(s) Oral daily  ATENolol  Tablet 50 milliGRAM(s) Oral daily  atorvastatin 10 milliGRAM(s) Oral at bedtime  enoxaparin Injectable 40 milliGRAM(s) SubCutaneous daily  furosemide   Injectable 40 milliGRAM(s) IV Push daily  levETIRAcetam 750 milliGRAM(s) Oral two times a day  levoFLOXacin IVPB      levoFLOXacin IVPB 750 milliGRAM(s) IV Intermittent every 24 hours  lisinopril 10 milliGRAM(s) Oral daily  pantoprazole    Tablet 40 milliGRAM(s) Oral before breakfast  potassium chloride    Tablet ER 40 milliEquivalent(s) Oral once  saccharomyces boulardii 250 milliGRAM(s) Oral two times a day      MEDICATIONS  (PRN):      Allergies    penicillin (Unknown)    Intolerances        PAST MEDICAL & SURGICAL HISTORY:  CVA (cerebral vascular accident)  CHF (congestive heart failure)  H/O colectomy  Aorta disorder          REVIEW OF SYSTEMS:    CONSTITUTIONAL:  No distress    HEENT:  Eyes:  No diplopia or blurred vision. ENT:  No earache, sore throat or runny nose.    CARDIOVASCULAR:  No pressure, squeezing, tightness, heaviness or aching about the chest; no palpitations.    RESPIRATORY:  per HPI     GASTROINTESTINAL:  No nausea, vomiting or diarrhea.    GENITOURINARY:  No dysuria, frequency or urgency.    NEUROLOGIC:  No paresthesias, fasciculations, seizures or weakness.    PSYCHIATRIC:  No disorder of thought or mood.    Vital Signs Last 24 Hrs  T(C): 36.4 (09 Oct 2019 07:37), Max: 36.8 (09 Oct 2019 05:17)  T(F): 97.6 (09 Oct 2019 07:37), Max: 98.2 (09 Oct 2019 05:17)  HR: 74 (09 Oct 2019 08:43) (65 - 79)  BP: 160/68 (09 Oct 2019 07:37) (153/61 - 165/70)  BP(mean): --  RR: 18 (09 Oct 2019 07:37) (18 - 20)  SpO2: 95% (09 Oct 2019 08:43) (95% - 99%)    PHYSICAL EXAMINATION:    GENERAL: The patient is awake and alert in no apparent distress.     HEENT: Head is normocephalic and atraumatic.  Mucous membranes are moist.    NECK: Supple.    LUNGS: rales at bases, no wheeze,  respirations unlabored    HEART: Regular rate and rhythm     ABDOMEN: Soft, nontender,  obese    EXTREMITIES: Without any cyanosis, clubbing, rash, lesions or edema.    NEUROLOGIC: Grossly intact.    LABS:                        15.0   10.58 )-----------( 213      ( 09 Oct 2019 07:27 )             46.2     10-09    145  |  101  |  25.0<H>  ----------------------------<  91  3.4<L>   |  26.0  |  1.18    Ca    9.4      09 Oct 2019 07:27    TPro  7.9  /  Alb  4.1  /  TBili  0.4  /  DBili  x   /  AST  23  /  ALT  19  /  AlkPhos  68  10-08    PT/INR - ( 08 Oct 2019 13:08 )   PT: 12.3 sec;   INR: 1.07 ratio         PTT - ( 08 Oct 2019 13:08 )  PTT:31.1 sec      CARDIAC MARKERS ( 08 Oct 2019 13:08 )  x     / <0.01 ng/mL / x     / x     / x            Serum Pro-Brain Natriuretic Peptide: 1310 pg/mL (10-08-19 @ 13:08)           RADIOLOGY & ADDITIONAL STUDIES:  < from: CT Chest No Cont (10.08.19 @ 16:04) >     EXAM:  CT CHEST                          PROCEDURE DATE:  10/08/2019          INTERPRETATION:  CLINICAL INFORMATION: Pulmonary nodules.     COMPARISON: 1/19/2010    PROCEDURE:   CT of the Chest was performed without intravenous contrast.  [place] coronal reformats were performed.      FINDINGS:    No evidence of mediastinal, hilar, or axillary lymphadenopathy. No   abnormalities seen at the base of the neck.    Small bilateral pleural effusions. No evidence of a pericardial effusion.    Coronary artery calcifications noted. Mild cardio megaly.    The visualized upper abdomen, calcifications are noted in keeping with   chronic aortic dissection. This is consistent with the finding of an   acute aortic dissection noted on 1/19/2010. 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 The aorta at this site   measures 3.1 cm in diameter on 1/19/2010. The proximal descending   thoracic aorta measures 4.3 cm no evidence of periaortic hematoma.    Diffuse groundglass interstitial prominence noted in the right lung,   right lower lobe worst and left lower lobe. Left basilar atelectasis.    Post sternotomy changes. This is a new finding since 1/19/2010. Evidence   of prior surgery involving the ascending thoracic aorta in keeping with   the presence of ascending thoracic aortic dissection noted on the prior   study.    No chest wall abnormality.    IMPRESSION:     Diffuse groundglass prominence noted throughout the right lung consistent   with diffuse pneumonia.    Small bilateral pleural effusions.    Left basilar atelectasis.    Interval development of an aneurysm at the level of the distal aortic   arch and proximal descending thoracic aorta measuring 4.1 cm and 4.3 cm   respectively since 1/19/2010. Cannot evaluate the lumen of the aorta   without IV contrast.    PEDRO LUIS COLEMAN M.D., ATTENDING RADIOLOGIST        Echo on 10/9     1. Left ventricular ejection fraction, by visual estimation, is 55 to   60%.   2. Normal global left ventricular systolic function.   3. Mildly increased LV wall thickness.   4. Spectral Doppler shows impaired relaxation pattern of left   ventricular myocardial filling (Grade I diastolic dysfunction).   5. Normal right ventricular size and function.   6. There is no evidence of pericardial effusion.   7. Mild mitral annular calcification.   8. Thickening of the anterior and posterior mitral valve leaflets.   9. Moderate to severe aortic regurgitation.  10. Flow reversal seen in the descending aorta.  11. Ascending aorta measuring 3.8 cm.  12. May Consider MUSTAPHA for evaluation of Aortic regurgitation if clinically   indicated.  13. Discussed with Dr Sin.    MD Attila Electronically signed on 10/10/2019 at 9:11:05 AM

## 2019-10-10 NOTE — DISCHARGE NOTE PROVIDER - CARE PROVIDER_API CALL
Katie Sin)  Cardiovascular Disease; Internal Medicine  [address], [address]  [place] [address]  Phone: [phone]  Fax: [phone]  Follow Up Time:     ramesh PENA,   Phone: (   )    -  Fax: (   )    -  Follow Up Time: Katie Sin)  Cardiovascular Disease; Internal Medicine  [address], [address]  [place] [address]  Phone: [phone]  Fax: [phone]  Follow Up Time:     ramesh PENA,   Phone: (   )    -  Fax: (   )    -  Follow Up Time:     Jerel Feldman)  Gastroenterology; Internal Medicine  [address], [address]  [place] [address]  Phone: [phone]  Fax: [phone]  Follow Up Time: 2 weeks

## 2019-10-10 NOTE — DISCHARGE NOTE PROVIDER - NSDCPNSUBOBJ_GEN_ALL_CORE
HEALTH ISSUES - PROBLEM Dx:    58 yo M c PMH of HTN, CAD- stent, leaky aortic valve, thoracic aortic dissection requiring emergent surgery, CVA with left sided sensory loss, colostomy for bowel ischemia s/p reversal        acute hypoxic resp failure- chf vs pna        INTERVAL HPI/ OVERNIGHT EVENTS:        comfortable on room air    d/w cardiology    dsicharge home today        REVIEW OF SYSTEMS:        fever- hypoxia - sob - cough -         ICU Vital Signs Last 24 Hrs    T(C): 36.4 (10 Oct 2019 07:33), Max: 36.8 (09 Oct 2019 16:04)    T(F): 97.6 (10 Oct 2019 07:33), Max: 98.3 (09 Oct 2019 16:04)    HR: 77 (10 Oct 2019 07:33) (58 - 98)    BP: 166/75 (10 Oct 2019 07:33) (159/80 - 181/75)    BP(mean): --    ABP: --    ABP(mean): --    RR: 19 (10 Oct 2019 07:33) (18 - 19)    SpO2: 96% (10 Oct 2019 03:50) (94% - 97%)            PHYSICAL EXAM-    GENERAL: no distress, comfortable lying in bed on room air    HEAD:  Atraumatic, Normocephalic    EYES: EOMI, conjunctiva and sclera clear    NECK: No JVD, Normal thyroid    NERVOUS SYSTEM:  Alert & Oriented X 3, Motor Strength 5/5 B/L upper and lower extremities;    CHEST/LUNG: CTA [place], No rhonchi, wheezing, or rubs    HEART: Regular rate and rhythm; No murmurs, rubs, or gallops    ABDOMEN: Soft, Nontender, Nondistended; Bowel sounds present; surgical scars +     EXTREMITIES:  2+ Peripheral Pulses, No clubbing, cyanosis, or edema    SKIN: No rashes or lesions        MEDICATIONS  (STANDING):    ALBUTerol/ipratropium for Nebulization 3 milliLiter(s) Nebulizer every 6 hours    amLODIPine   Tablet 10 milliGRAM(s) Oral daily    aspirin enteric coated 81 milliGRAM(s) Oral daily    ATENolol  Tablet 50 milliGRAM(s) Oral daily    atorvastatin 10 milliGRAM(s) Oral at bedtime    enoxaparin Injectable 40 milliGRAM(s) SubCutaneous daily    furosemide   Injectable 40 milliGRAM(s) IV Push daily    levETIRAcetam 750 milliGRAM(s) Oral two times a day    levoFLOXacin IVPB        levoFLOXacin IVPB 750 milliGRAM(s) IV Intermittent every 24 hours    lisinopril 10 milliGRAM(s) Oral daily    pantoprazole    Tablet 40 milliGRAM(s) Oral before breakfast    saccharomyces boulardii 250 milliGRAM(s) Oral two times a day        MEDICATIONS  (PRN):            LABS:                            15.0     10.58 )-----------( 213      ( 09 Oct 2019 07:27 )               46.2         10-09        145  |  101  |  25.0<H>    ----------------------------<  91    3.4<L>   |  26.0  |  1.18        Ca    9.4      09 Oct 2019 07:27        TPro  7.9  /  Alb  4.1  /  TBili  0.4  /  DBili  x   /  AST  23  /  ALT  19  /  AlkPhos  68  10-08        PT/INR - ( 08 Oct 2019 13:08 )   PT: 12.3 sec;   INR: 1.07 ratio               PTT - ( 08 Oct 2019 13:08 )  PTT:31.1 sec                Assessment and Plan:     · Assessment	    58 yo M c PMH of HTn, CAD- stent, leaky aortic valve, thoracic aortic dissection requiring emergent surgery, CVA with left sided sensory loss, colostomy for bowel ischemia s/p reversal        # Acute  hypoxic respiratory failure    required BIPAP for a short while     now on room air    CT chest- GG opacities- possible pneumonia vs pulm edema    BNP elevated, mild leucocytosis    no h/o of both CHF and COPD    non toxic, no fever    will give levaquin, nebs    aggressive diuresis with lasix    ECHO - P    if no improvement will start steroid trial    d/w cardiology- his Feb ECHO with EF 45-50% and mild to mod MR    new ECHo- mod to sev AR, EF 55- 60%. grd 1 diastolic dysfxn            # HTN    inital htn urgency in ED noted    now uncontrolled HTN    will resume home Bp meds and titrate as needed    cardiology watns CTA chest to further evaluate the aneurysm form pre surgical aorta        # Pt states he overdoses on tylenol Pm and sleeps for 3 days in a row not waking for anything. and when he is awake binges on food until he throws up or has SOB. He also believes he has today's problems sec to panic attack.     suspect underlying anxiety/ depression with prior h/o addiction    appreciate psych consult    o/p psych appointment with ASAEL to be set up by [facility]        Possible d/c home today with PO antibiotics and po lasix. if symptoms persist or worsen then will need uptitration of antibiotics given that he is on Humira.

## 2019-10-10 NOTE — PROGRESS NOTE ADULT - SUBJECTIVE AND OBJECTIVE BOX
HEALTH ISSUES - PROBLEM Dx:    acute hypoxic resp failure- chf vs pna    INTERVAL HPI/ OVERNIGHT EVENTS:    comfortable on room air  c/o constipation    REVIEW OF SYSTEMS:    fever- constipation + nausea - vomits +    Vital Signs Last 24 Hrs  T(C): 36.8 (10 Oct 2019 15:10), Max: 36.8 (10 Oct 2019 01:02)  T(F): 98.3 (10 Oct 2019 15:10), Max: 98.3 (10 Oct 2019 15:10)  HR: 68 (10 Oct 2019 15:33) (58 - 98)  BP: 134/77 (10 Oct 2019 15:10) (134/77 - 179/61)  BP(mean): --  RR: 18 (10 Oct 2019 15:10) (18 - 19)  SpO2: 96% (10 Oct 2019 15:33) (94% - 98%)    PHYSICAL EXAM-  GENERAL: no distress, comfortable lying in bed on room air  HEAD:  Atraumatic, Normocephalic  EYES: EOMI, conjunctiva and sclera clear  NECK: No JVD, Normal thyroid  NERVOUS SYSTEM:  Alert & Oriented X 3, Motor Strength 5/5 B/L upper and lower extremities;  CHEST/LUNG: CTA [place], No rhonchi, wheezing, or rubs  HEART: Regular rate and rhythm; No murmurs, rubs, or gallops  ABDOMEN: Soft, Nontender, Nondistended; Bowel sounds present; surgical scars +   EXTREMITIES:  2+ Peripheral Pulses, No clubbing, cyanosis, or edema  SKIN: No rashes or lesions    MEDICATIONS  (STANDING):  ALBUTerol/ipratropium for Nebulization 3 milliLiter(s) Nebulizer every 6 hours  enalaprilat Injectable 5 milliGRAM(s) IV Push every 6 hours  enoxaparin Injectable 40 milliGRAM(s) SubCutaneous daily  levETIRAcetam  IVPB 750 milliGRAM(s) IV Intermittent every 12 hours  levoFLOXacin IVPB 750 milliGRAM(s) IV Intermittent every 24 hours  levoFLOXacin IVPB      metoclopramide Injectable 10 milliGRAM(s) IV Push every 8 hours  metoprolol tartrate Injectable 5 milliGRAM(s) IV Push every 6 hours  pantoprazole  Injectable 40 milliGRAM(s) IV Push daily    MEDICATIONS  (PRN):      LABS:                        15.0   10.58 )-----------( 213      ( 09 Oct 2019 07:27 )             46.2     10-10    141  |  101  |  29.0<H>  ----------------------------<  114  3.5   |  26.0  |  1.29    Ca    9.4      10 Oct 2019 08:19

## 2019-10-10 NOTE — PROGRESS NOTE BEHAVIORAL HEALTH - NSBHFUPINTERVALHXFT_PSY_A_CORE
Patient seen for follow-up today. He reports mood is "okay" and became tearful when talking about past regrets.  Patient reports long h/o of depression/anxiety which started after his stroke. He denies S/h/I/I/P and states his family is very supportive and the reason he "goes on living." Patient is not in agreement to trial of antidepressant at this time however is in agreement to [facility] referral. Patient has been compliant with treatment and maintained good eye contact during conversation. Patient reports good sleep last night and received prn Ambien 5 mg.

## 2019-10-10 NOTE — DISCHARGE NOTE NURSING/CASE MANAGEMENT/SOCIAL WORK - PATIENT PORTAL LINK FT
You can access the FollowMyHealth Patient Portal offered by [facility] by registering at the following website: [email or web address]. By joining [facility]’s FollowMyHealth portal, you will also be able to view your health information using other applications (apps) compatible with our system.

## 2019-10-10 NOTE — DISCHARGE NOTE PROVIDER - HOSPITAL COURSE
60 yo M c PMH of HTN, CAD- stent, leaky aortic valve, thoracic aortic dissection requiring emergent surgery, CVA with left sided sensory loss, colostomy for bowel ischemia s/p reversal        Acute  hypoxic respiratory failure likely sec to CHF exacerbation. likely diastolic CHF. Cannot completely r/o PNA so Levaquin empirically started.     prior surgical aorta now with some aneurysm - to evaluate further CTA chest done.    pt to follow up with cardiology in office         Medically stable and agreeable with discharge and follow up plan. Patient was advised to return to ED if any symptoms occur or worsen.        TIME 40 mins 60 yo M c PMH of HTN, CAD- stent, leaky aortic valve, thoracic aortic dissection requiring emergent surgery, CVA with left sided sensory loss, colostomy for bowel ischemia s/p reversal        Acute  hypoxic respiratory failure likely sec to CHF exacerbation. likely diastolic CHF. Cannot completely r/o PNA so Levaquin empirically started.     prior surgical aorta now with some aneurysm - to evaluate further CTA chest done.    pt to follow up with cardiology in office         Medically stable and agreeable with discharge and follow up plan. Patient was advised to return to ED if any symptoms occur or worsen.        (Addendum 10/15/19)    The patient had improvement in his respiratory status but was noted to have abdominal distention, pain, and vomiting and was seen by Surgery in consultation for possible bowel obstruction. The oral die twas discontinued and the patient monitored. Abdominal Xray noted evidence of an ileus. The patient was later started in a liquid diet which was advanced as tolerated. The patient reported a history of intestinal narrowing in the past for which he was following with his gastroenterologist. The diet was advanced and the patietn was ultimately able to tolerate an oral diet of regular consistency and was without further abdominal pain. The patient completed the course of antibiotics. Instructions were given to follow up with his primary care physician and gastroenterologist for further management.        39 minutes total time

## 2019-10-10 NOTE — PROGRESS NOTE ADULT - ASSESSMENT
60 yo M c PMH of HTN, CAD- stent, leaky aortic valve, thoracic aortic dissection requiring emergent surgery, CVA with left sided sensory loss, colostomy for bowel ischemia s/p reversal    # Acute hypoxic respiratory failure  required BIPAP for a few hrs in ED  now on room air  CT chest- GG opacities- possible pneumonia vs pulm edema  BNP elevated, mild leucocytosis  no h/o of both CHF and COPD  non toxic, no fever  will give levaquin, oxygen, nebs  aggressive diuresis with lasix  ECHO - improved EF. mod to sev AS    # constipation, emesis x1, CT abd with ileus  made NPO  meds changed to IVP as best  Reglan RTC  ACS consulted  will begin IVF in AM    # HTN  inital htn urgency in ED noted  now uncontrolled HTN  will resume home Bp meds and titrate as needed    # Pt states he overdoses on tylenol Pm and sleeps for 3 days in a row not waking for anything. and when he is awake binges on food until he throws up or has SOB. He also believes he has today's problems sec to panic attack.   suspect underlying anxiety/ depression with prior h/o addiction  appreciate psych consult  o/p psych appointment with ASAEL to be set up by [facility]    Possible d/c home in AM with PO antibiotics and po lasix. if symptoms persist or worsen then will need uptitration of antibiotics given that he is on Humira.

## 2019-10-10 NOTE — PROGRESS NOTE BEHAVIORAL HEALTH - SUMMARY
Patient is a 59 year old, male; domicile by self; never ; unemployed on disability ; with no formal history of psychiatric treatment but with h/o anxiety and depression last 10 years; no psychiatric  hospitalizations; no known suicide attempts; h/o prior cocaine use (no use since 2001) ; PMH of HTN, CAD- stent, leaky aortic valve, thoracic aortic dissection requiring emergent surgery, CVA with left sided sensory loss, colostomy for bowel ischemia s/p reversal now presents with h/o of worsening SOB. Currently being treated for CHF exacerbation vs pneumonia. Asked for psychiatric evaluation and medication recommendation.  Patient does reports h/o consistent with moderate MDD, denies any S/H I/I/P with stress related to medical problems, loss of mother 2 years ago.  He has also been taking excessive tylenol PM every night for last 10 years to help sleep. He sometimes spends 2-3 days in bed.  Denies any suicidal intent.  He does have cocaine abuse in remission since 2001, and gets panic attacks at times triggered by medical condition or symptoms.  Discussed tx options. Does not want to take medications at this time but will consider.   Supportive counseling provided  Continues to refuse trial of SSRI for now  Agrees to [facility]- appointment 10/17.

## 2019-10-10 NOTE — DISCHARGE NOTE PROVIDER - PROVIDER TOKENS
PROVIDER:[TOKEN:[[id number]]],FREE:[LAST:[ramesh CAMPBELLD],PHONE:[(   )    -],FAX:[(   )    -]] PROVIDER:[TOKEN:[[id number]]],FREE:[LAST:[ramesh CAMPBELLD],PHONE:[(   )    -],FAX:[(   )    -]],PROVIDER:[TOKEN:[[id number]],FOLLOWUP:[2 weeks]]

## 2019-10-11 LAB
ANION GAP SERPL CALC-SCNC: 20 MMOL/L — HIGH (ref 5–17)
BUN SERPL-MCNC: 44 MG/DL — HIGH (ref 8–20)
CALCIUM SERPL-MCNC: 9.9 MG/DL — SIGNIFICANT CHANGE UP (ref 8.6–10.2)
CHLORIDE SERPL-SCNC: 95 MMOL/L — LOW (ref 98–107)
CO2 SERPL-SCNC: 22 MMOL/L — SIGNIFICANT CHANGE UP (ref 22–29)
CREAT SERPL-MCNC: 2.05 MG/DL — HIGH (ref 0.5–1.3)
GLUCOSE SERPL-MCNC: 97 MG/DL — SIGNIFICANT CHANGE UP (ref 70–115)
HCT VFR BLD CALC: 48.8 % — SIGNIFICANT CHANGE UP (ref 39–50)
HGB BLD-MCNC: 16.3 G/DL — SIGNIFICANT CHANGE UP (ref 13–17)
MAGNESIUM SERPL-MCNC: 2.1 MG/DL — SIGNIFICANT CHANGE UP (ref 1.6–2.6)
MCHC RBC-ENTMCNC: 30.2 PG — SIGNIFICANT CHANGE UP (ref 27–34)
MCHC RBC-ENTMCNC: 33.4 GM/DL — SIGNIFICANT CHANGE UP (ref 32–36)
MCV RBC AUTO: 90.4 FL — SIGNIFICANT CHANGE UP (ref 80–100)
PHOSPHATE SERPL-MCNC: 5.6 MG/DL — HIGH (ref 2.4–4.7)
PLATELET # BLD AUTO: 272 K/UL — SIGNIFICANT CHANGE UP (ref 150–400)
POTASSIUM SERPL-MCNC: 3.5 MMOL/L — SIGNIFICANT CHANGE UP (ref 3.5–5.3)
POTASSIUM SERPL-SCNC: 3.5 MMOL/L — SIGNIFICANT CHANGE UP (ref 3.5–5.3)
RBC # BLD: 5.4 M/UL — SIGNIFICANT CHANGE UP (ref 4.2–5.8)
RBC # FLD: 13.1 % — SIGNIFICANT CHANGE UP (ref 10.3–14.5)
SODIUM SERPL-SCNC: 137 MMOL/L — SIGNIFICANT CHANGE UP (ref 135–145)
WBC # BLD: 12.4 K/UL — HIGH (ref 3.8–10.5)
WBC # FLD AUTO: 12.4 K/UL — HIGH (ref 3.8–10.5)

## 2019-10-11 PROCEDURE — 99232 SBSQ HOSP IP/OBS MODERATE 35: CPT

## 2019-10-11 PROCEDURE — 74018 RADEX ABDOMEN 1 VIEW: CPT | Mod: 26

## 2019-10-11 PROCEDURE — 99233 SBSQ HOSP IP/OBS HIGH 50: CPT

## 2019-10-11 RX ORDER — AMLODIPINE BESYLATE 2.5 MG/1
5 TABLET ORAL DAILY
Refills: 0 | Status: DISCONTINUED | OUTPATIENT
Start: 2019-10-11 | End: 2019-10-11

## 2019-10-11 RX ORDER — METOPROLOL TARTRATE 50 MG
5 TABLET ORAL EVERY 6 HOURS
Refills: 0 | Status: DISCONTINUED | OUTPATIENT
Start: 2019-10-11 | End: 2019-10-12

## 2019-10-11 RX ORDER — ASPIRIN/CALCIUM CARB/MAGNESIUM 324 MG
81 TABLET ORAL DAILY
Refills: 0 | Status: DISCONTINUED | OUTPATIENT
Start: 2019-10-11 | End: 2019-10-15

## 2019-10-11 RX ORDER — HYDRALAZINE HCL 50 MG
5 TABLET ORAL EVERY 6 HOURS
Refills: 0 | Status: DISCONTINUED | OUTPATIENT
Start: 2019-10-11 | End: 2019-10-15

## 2019-10-11 RX ORDER — ATENOLOL 25 MG/1
50 TABLET ORAL DAILY
Refills: 0 | Status: DISCONTINUED | OUTPATIENT
Start: 2019-10-11 | End: 2019-10-11

## 2019-10-11 RX ORDER — SODIUM CHLORIDE 9 MG/ML
1000 INJECTION, SOLUTION INTRAVENOUS
Refills: 0 | Status: DISCONTINUED | OUTPATIENT
Start: 2019-10-11 | End: 2019-10-13

## 2019-10-11 RX ORDER — FUROSEMIDE 40 MG
40 TABLET ORAL DAILY
Refills: 0 | Status: DISCONTINUED | OUTPATIENT
Start: 2019-10-11 | End: 2019-10-11

## 2019-10-11 RX ADMIN — Medication 10 MILLIGRAM(S): at 12:02

## 2019-10-11 RX ADMIN — Medication 5 MILLIGRAM(S): at 20:30

## 2019-10-11 RX ADMIN — Medication 10 MILLIGRAM(S): at 21:24

## 2019-10-11 RX ADMIN — LEVETIRACETAM 400 MILLIGRAM(S): 250 TABLET, FILM COATED ORAL at 05:22

## 2019-10-11 RX ADMIN — PANTOPRAZOLE SODIUM 40 MILLIGRAM(S): 20 TABLET, DELAYED RELEASE ORAL at 12:02

## 2019-10-11 RX ADMIN — Medication 10 MILLIGRAM(S): at 05:22

## 2019-10-11 RX ADMIN — Medication 5 MILLIGRAM(S): at 13:25

## 2019-10-11 RX ADMIN — LEVETIRACETAM 400 MILLIGRAM(S): 250 TABLET, FILM COATED ORAL at 16:53

## 2019-10-11 RX ADMIN — ENOXAPARIN SODIUM 40 MILLIGRAM(S): 100 INJECTION SUBCUTANEOUS at 12:03

## 2019-10-11 RX ADMIN — Medication 5 MILLIGRAM(S): at 06:15

## 2019-10-11 RX ADMIN — Medication 5 MILLIGRAM(S): at 17:14

## 2019-10-11 RX ADMIN — Medication 81 MILLIGRAM(S): at 12:02

## 2019-10-11 RX ADMIN — Medication 5 MILLIGRAM(S): at 23:21

## 2019-10-11 NOTE — PROGRESS NOTE ADULT - SUBJECTIVE AND OBJECTIVE BOX
[facility]                                                    [address], [address], [place] [address]                                                         PHONE: [phone]    FAX: [phone] [address] [facility], [address], [place] [address]                                                 PHONE: [phone]    FAX: [phone]  *******************************************************************************  cc: SOB    HPI: 60yo M presented with SOB. Inability to compete sentences. Symptoms were new in onset on morning of presentation. Pt reports he woke up with the symptoms. Pt reports a Cough without sputum production for several days prior. Pt denies fever, chills or constitutional symptoms. No chest pain. No palpitations. No PND or orthopnea reported as symptoms were sudden in onset. No dizziness or syncope.   Past MI with coronary stenting 1998. PAD. Thoracic aortic aneurysm repair for acute dissection with hx of CVA with left sided sensory loss 2001 in light of cocaine use. Moderate aortic insufficiency with dilated aortic root. No hx of COPD.    [place]/PVR 6/6/18 R 1.17. L 1/28  Echo 2/23/19  EF 45%, basilar and inferior septus, basal and mid inferior wall and basal and mid IL hypokinesis. Mild to mod MR/AR, Tr TR. dilation of the aortic root and ascending aorta measuring 4.5 and 4.1cm respectively  Carotid 2/23/19 16-49% bilat  Nuclear stress test 2/26/18 fixed IPL defect. EF 64%. No change from nuclear stress test 9/10/15      Overnight events/Subjective Assessment: resolution of SOB. Laying flat.    INTERPRETATION OF TELEMETRY (personally reviewed): SR    PAST MEDICAL & SURGICAL HISTORY:  CVA (cerebral vascular accident)  CHF (congestive heart failure)  H/O colectomy  Aorta disorder      penicillin (Unknown)      MEDICATIONS  (STANDING):  ALBUTerol/ipratropium for Nebulization 3 milliLiter(s) Nebulizer every 6 hours  amLODIPine   Tablet 10 milliGRAM(s) Oral daily  aspirin enteric coated 81 milliGRAM(s) Oral daily  ATENolol  Tablet 50 milliGRAM(s) Oral daily  atorvastatin 10 milliGRAM(s) Oral at bedtime  enoxaparin Injectable 40 milliGRAM(s) SubCutaneous daily  furosemide   Injectable 40 milliGRAM(s) IV Push daily  levETIRAcetam 750 milliGRAM(s) Oral two times a day  levoFLOXacin IVPB      levoFLOXacin IVPB 750 milliGRAM(s) IV Intermittent every 24 hours  lisinopril 10 milliGRAM(s) Oral daily  pantoprazole    Tablet 40 milliGRAM(s) Oral before breakfast  saccharomyces boulardii 250 milliGRAM(s) Oral two times a day    MEDICATIONS  (PRN):  zolpidem 5 milliGRAM(s) Oral at bedtime PRN Insomnia      Vital Signs Last 24 Hrs  T(C): 36.4 (10 Oct 2019 07:33), Max: 36.8 (09 Oct 2019 16:04)  T(F): 97.6 (10 Oct 2019 07:33), Max: 98.3 (09 Oct 2019 16:04)  HR: 77 (10 Oct 2019 07:33) (58 - 98)  BP: 166/75 (10 Oct 2019 07:33) (159/80 - 181/75)  BP(mean): --  RR: 19 (10 Oct 2019 07:33) (18 - 19)  SpO2: 96% (10 Oct 2019 03:50) (94% - 97%)    I&O's Detail    I&O's Summary          PHYSICAL EXAM:  General: Appears well developed, well nourished, no acute distress. not in acute pain. overweight body habitus  HEAD: normal cephalic. Atraumatic  PUPILS: equal and reactive to light  EARS: normal hearing  NECK: supple. no JVD or HJR. no carotid bruits. no visible lymphadenopathy  NOSE: no gross abnormalities  CHEST: symmetric chest wall expansion  CARDIOVASCULAR: Normal rate. Regular rhythm. Normal S1 and S2, no S3/S4,  no murmur, rub, or gallop  LUNGS: Normal effort. Normal respiratory rate. Breath sounds are clear to auscultation except for mild crackles left lung base. No respiratory distress. No stridor.  no rales, rhonchi or wheeze. no decreased Breath sounds  ABDOMEN: Soft, nontender, non-distended, positive bowel sounds, no mass or bruit. no abdominal tenderness. No rebound. no ascites  EXTREMITIES: No clubbing, cyanosis or edema. normal range of motion  PULSES:  distal pulses WNL  SKIN: Warm and dry with normal turgor. no visible rash or cyanosis   NEURO: Alert & oriented x 3, grossly intact with no focal weakness  PSYCH: normal mood and affect. Grossly normal insight and judgement exhibited    FAMILY HISTORY: Alcoholism both parents.  No family hx of ischemic heart disease mother, father or 1st degree relatives.      SOCIAL HISTORY:   former smoking and cocaine, past social ETOH. Reports quit all in 2001    REVIEW OF SYSTEMS:  Constitutional: no fever, chills or malaise. No weight loss  Head: no trauma  Eyes: no visual deficit. No double vision  Ears: no hearing deficit or ringing in the ears  Nose: no nose bleeds or smell changes or congestion  Throat: no difficult swallowing or painful swallowing  Neck: supple. No lymphadenopathy or swelling  Respiratory: + SOB, no wheeze, asthma, COPD. No cough. No blood in the sputum  Cardiovascular: no CP, palpitations, irregular heart beats. No edema. No PND. No orthopnea. No skin/temperature or color changes  Gastrointestinal: no abdominal pain. No constipation. No diarrhea. No melena. No nausea. No vomiting. No bloating  Genitourinary: no frequency or urgency. No hematuria  Lymphatics: no grossly swollen lymph nodes  Musculoskeletal: no limitation of range of motion. Normal strength. No pain  Integumentary: no visible rash. No itching  Neurologic: no HA. No TIA or stroke symptoms. No seizure. No hx of epilepsy. No tingling or numbness. No weakness. No dizziness  Psychiatric: denied. Reports appropriate mood.        LABS:             Troponin T, Serum (10.10.19 @ 15:38)    Troponin T, Serum: <0.01 ng/mL            RADIOLOGY & ADDITIONAL STUDIES:    ECG: SR LVH LAE. QwIII. Diffuse ST abnormality anteriorly (10/8/19)    < from: CT Chest No Cont (10.08.19 @ 16:04) >  IMPRESSION:     Diffuse groundglass prominence noted throughout the right lung consistent   with diffuse pneumonia.    Small bilateral pleural effusions.    Left basilar atelectasis.    Interval development of an aneurysm at the level of the distal aortic   arch and proximal descending thoracic aorta measuring 4.1 cm and 4.3 cm   respectively since 1/19/2010. Cannot evaluate the lumen of the aorta   without IV contrast.    < end of copied text >    < from: Xray Chest 1 View- PORTABLE-Urgent (10.08.19 @ 13:15) >  FINDINGS:    The lungs  show ill-defined bilateral lung peripheral opacities and a   linear RIGHT perihilar and fibrosis. UNDERLYING PULMONARY NODULES CANNOT   BE EXCLUDED AND A CT SCAN RECOMMENDED FOR FURTHER CLARIFICATION.    The heart and mediastinum are within normal limits.    Visualized osseous structures are intact.    < end of copied text >      < from: CT Angio Abdomen and Pelvis w/ IV Cont (10.10.19 @ 14:32) >  IMPRESSION:     Status post repair of a type A aortic dissection with the dissection flap   extending into a common origin of the right brachiocephalic and left   common carotid arteries and into the left subclavian artery; comparison   with prior preoperative chest CT 1/19/2010 is limited in the region of   the great vessels, however dissection flap appears to extend into the   right brachiocephalic and left subclavian arteries on the prior study.    Dissection flap extends into the proximal left common iliac and right   external iliac arteries; distal extent of the dissection flap has   increased since 1/19/2010.    Dilated distal aortic arch/proximal descending thoracic aorta measuring   4.2 cm.    Status post colectomy with dilated loops of small bowel with collapse of   the distal most aspect of the small bowel proximal to the rectal   anastomosis, possibly an obstruction or ileus; correlation is recommended   with the patient's symptoms.    Interval improvement in bilateral groundglass opacities in the lungs,   possibly improving pulmonary edema.      < end of copied text >      < from: TTE Echo w/Cont Complete (10.09.19 @ 15:06) >  Summary:   1. Left ventricular ejection fraction, by visual estimation, is 55 to   60%.   2. Normal global left ventricular systolic function.   3. Mildly increased LV wall thickness.   4. Spectral Doppler shows impaired relaxation pattern of left   ventricular myocardial filling (Grade I diastolic dysfunction).   5. Normal right ventricular size and function.   6. There is no evidence of pericardial effusion.   7. Mild mitral annular calcification.   8. Thickening of the anterior and posterior mitral valve leaflets.   9. Moderate to severe aortic regurgitation.  10. Flow reversal seen in the descending aorta.  11. Ascending aorta measuring 3.8 cm.  12. May Consider MUSTAPHA for evaluation of Aortic regurgitation if clinically   indicated.  13. Discussed with Dr Sin.    < end of copied text >    ASSESSMENT AND PLAN:  In summary, JONATHAN GIBSON is a 59y Male with past medical history significant for SOB. Inability to compete sentences. Symptoms were new in onset on morning of presentation. Pt reports he woke up with the symptoms. Pt reports a Cough without sputum production for several days prior. Pt denies fever, chills or constitutional symptoms. No chest pain. No palpitations. No PND or orthopnea reported as symptoms were sudden in onset. No dizziness or syncope.   Past MI with coronary stenting 1998. PAD. Thoracic aortic aneurysm repair for dissection with hx of CVA with left sided sensory loss 2001 in light of cocaine use. Moderate aortic insufficiency with dilated aortic root. No hx of COPD.    [place]/PVR 6/6/18 R 1.17. L 1/28  Echo 2/23/19  EF 45%, basilar and inferior septus, basal and mid inferior wall and basal and mid IL hypokinesis. Mild to mod MR/AR, Tr TR. dilation of the aortic root and ascending aorta measuring 4.5 and 4.1cm respectively  Carotid 2/23/19 16-49% bilat  Nuclear stress test 2/26/18 fixed IPL defect. EF 64%. No change from nuclear stress test 9/10/15    - SOB. Acute in onset. Right lung ground glass prominence suggestive of PNA with small bilateral pleural effusions. Pt denies fever, chills or constitutional symptoms. ABX as per medical. Still concern for underlying combined acute systolic and diastolic CHF. Symptoms responded to lasix 40mg daily IV. Will change to po.    - CAD. Past MI with coronary stenting 1998. Abnormal ECG. Repeat ECG from 10/10 unchanged. serial cardiac enzymes negative for ACS. Pt denies active chest pain. maintain ASA for now. In the absence of chest pain and negative cardiac enzymes will pursue outpt ischemic evaluation    - Thoracic aortic aneurysm repair following acute dissection 2001 in light of cocaine use at the time, since discontinued. Chest CT this presentation with Interval development of an aneurysm at the level of the distal aortic arch and proximal descending thoracic aorta measuring 4.1 cm and 4.3 cm respectively since 1/19/2010. CT with contrast as above. Will need following serially as an outpt    - HTN. amlodipine 10mg daily, atenolol 50mg daily    - HL atorvastatin 10mg daily    - Echocardiogram with normal LV function and moderate to severe AR. Will reassess AR as an outpt when PNA fully treated    - PNA. ABX per medical    - Telemetry monitoring personally reviewed by me    - ECG personally reviewed by me    - radiologic imaging reviewed    - Laboratory data reviewed.    - I have personally reviewed all obtainable prior records and data including office records [facility]                                                    [address], [address], [place] [address]                                                         PHONE: [phone]    FAX: [phone] [address] [facility], [address], [place] [address]                                                 PHONE: [phone]    FAX: [phone]  *******************************************************************************  cc: SOB    HPI: 60yo M presented with SOB. Inability to compete sentences. Symptoms were new in onset on morning of presentation. Pt reports he woke up with the symptoms. Pt reports a Cough without sputum production for several days prior. Pt denies fever, chills or constitutional symptoms. No chest pain. No palpitations. No PND or orthopnea reported as symptoms were sudden in onset. No dizziness or syncope.   Past MI with coronary stenting 1998. PAD. Thoracic aortic aneurysm repair for acute dissection with hx of CVA with left sided sensory loss 2001 in light of cocaine use. Moderate aortic insufficiency with dilated aortic root. No hx of COPD.    [place]/PVR 6/6/18 R 1.17. L 1/28  Echo 2/23/19  EF 45%, basilar and inferior septus, basal and mid inferior wall and basal and mid IL hypokinesis. Mild to mod MR/AR, Tr TR. dilation of the aortic root and ascending aorta measuring 4.5 and 4.1cm respectively  Carotid 2/23/19 16-49% bilat  Nuclear stress test 2/26/18 fixed IPL defect. EF 64%. No change from nuclear stress test 9/10/15      Overnight events/Subjective Assessment: resolution of SOB. Laying flat.    INTERPRETATION OF TELEMETRY (personally reviewed): SR    PAST MEDICAL & SURGICAL HISTORY:  CVA (cerebral vascular accident)  CHF (congestive heart failure)  H/O colectomy  Aorta disorder      penicillin (Unknown)      MEDICATIONS  (STANDING):  ALBUTerol/ipratropium for Nebulization 3 milliLiter(s) Nebulizer every 6 hours  amLODIPine   Tablet 10 milliGRAM(s) Oral daily  aspirin enteric coated 81 milliGRAM(s) Oral daily  ATENolol  Tablet 50 milliGRAM(s) Oral daily  atorvastatin 10 milliGRAM(s) Oral at bedtime  enoxaparin Injectable 40 milliGRAM(s) SubCutaneous daily  furosemide   Injectable 40 milliGRAM(s) IV Push daily  levETIRAcetam 750 milliGRAM(s) Oral two times a day  levoFLOXacin IVPB      levoFLOXacin IVPB 750 milliGRAM(s) IV Intermittent every 24 hours  lisinopril 10 milliGRAM(s) Oral daily  pantoprazole    Tablet 40 milliGRAM(s) Oral before breakfast  saccharomyces boulardii 250 milliGRAM(s) Oral two times a day    MEDICATIONS  (PRN):  zolpidem 5 milliGRAM(s) Oral at bedtime PRN Insomnia      Vital Signs Last 24 Hrs  T(C): 36.4 (10 Oct 2019 07:33), Max: 36.8 (09 Oct 2019 16:04)  T(F): 97.6 (10 Oct 2019 07:33), Max: 98.3 (09 Oct 2019 16:04)  HR: 77 (10 Oct 2019 07:33) (58 - 98)  BP: 166/75 (10 Oct 2019 07:33) (159/80 - 181/75)  BP(mean): --  RR: 19 (10 Oct 2019 07:33) (18 - 19)  SpO2: 96% (10 Oct 2019 03:50) (94% - 97%)    I&O's Detail    I&O's Summary          PHYSICAL EXAM:  General: Appears well developed, well nourished, no acute distress. not in acute pain. overweight body habitus  HEAD: normal cephalic. Atraumatic  PUPILS: equal and reactive to light  EARS: normal hearing  NECK: supple. no JVD or HJR. no carotid bruits. no visible lymphadenopathy  NOSE: no gross abnormalities  CHEST: symmetric chest wall expansion  CARDIOVASCULAR: Normal rate. Regular rhythm. Normal S1 and S2, no S3/S4,  no murmur, rub, or gallop  LUNGS: Normal effort. Normal respiratory rate. Breath sounds are clear to auscultation except for mild crackles left lung base. No respiratory distress. No stridor.  no rales, rhonchi or wheeze. no decreased Breath sounds  ABDOMEN: Soft, nontender, non-distended, positive bowel sounds, no mass or bruit. no abdominal tenderness. No rebound. no ascites  EXTREMITIES: No clubbing, cyanosis or edema. normal range of motion  PULSES:  distal pulses WNL  SKIN: Warm and dry with normal turgor. no visible rash or cyanosis   NEURO: Alert & oriented x 3, grossly intact with no focal weakness  PSYCH: normal mood and affect. Grossly normal insight and judgement exhibited    FAMILY HISTORY: Alcoholism both parents.  No family hx of ischemic heart disease mother, father or 1st degree relatives.      SOCIAL HISTORY:   former smoking and cocaine, past social ETOH. Reports quit all in 2001    REVIEW OF SYSTEMS:  Constitutional: no fever, chills or malaise. No weight loss  Head: no trauma  Eyes: no visual deficit. No double vision  Ears: no hearing deficit or ringing in the ears  Nose: no nose bleeds or smell changes or congestion  Throat: no difficult swallowing or painful swallowing  Neck: supple. No lymphadenopathy or swelling  Respiratory: + SOB, no wheeze, asthma, COPD. No cough. No blood in the sputum  Cardiovascular: no CP, palpitations, irregular heart beats. No edema. No PND. No orthopnea. No skin/temperature or color changes  Gastrointestinal: no abdominal pain. No constipation. No diarrhea. No melena. No nausea. No vomiting. No bloating  Genitourinary: no frequency or urgency. No hematuria  Lymphatics: no grossly swollen lymph nodes  Musculoskeletal: no limitation of range of motion. Normal strength. No pain  Integumentary: no visible rash. No itching  Neurologic: no HA. No TIA or stroke symptoms. No seizure. No hx of epilepsy. No tingling or numbness. No weakness. No dizziness  Psychiatric: denied. Reports appropriate mood.        LABS:             Troponin T, Serum (10.10.19 @ 15:38)    Troponin T, Serum: <0.01 ng/mL            RADIOLOGY & ADDITIONAL STUDIES:    ECG: SR LVH LAE. QwIII. Diffuse ST abnormality anteriorly (10/8/19)    < from: CT Chest No Cont (10.08.19 @ 16:04) >  IMPRESSION:     Diffuse groundglass prominence noted throughout the right lung consistent   with diffuse pneumonia.    Small bilateral pleural effusions.    Left basilar atelectasis.    Interval development of an aneurysm at the level of the distal aortic   arch and proximal descending thoracic aorta measuring 4.1 cm and 4.3 cm   respectively since 1/19/2010. Cannot evaluate the lumen of the aorta   without IV contrast.    < end of copied text >    < from: Xray Chest 1 View- PORTABLE-Urgent (10.08.19 @ 13:15) >  FINDINGS:    The lungs  show ill-defined bilateral lung peripheral opacities and a   linear RIGHT perihilar and fibrosis. UNDERLYING PULMONARY NODULES CANNOT   BE EXCLUDED AND A CT SCAN RECOMMENDED FOR FURTHER CLARIFICATION.    The heart and mediastinum are within normal limits.    Visualized osseous structures are intact.    < end of copied text >      < from: CT Angio Abdomen and Pelvis w/ IV Cont (10.10.19 @ 14:32) >  IMPRESSION:     Status post repair of a type A aortic dissection with the dissection flap   extending into a common origin of the right brachiocephalic and left   common carotid arteries and into the left subclavian artery; comparison   with prior preoperative chest CT 1/19/2010 is limited in the region of   the great vessels, however dissection flap appears to extend into the   right brachiocephalic and left subclavian arteries on the prior study.    Dissection flap extends into the proximal left common iliac and right   external iliac arteries; distal extent of the dissection flap has   increased since 1/19/2010.    Dilated distal aortic arch/proximal descending thoracic aorta measuring   4.2 cm.    Status post colectomy with dilated loops of small bowel with collapse of   the distal most aspect of the small bowel proximal to the rectal   anastomosis, possibly an obstruction or ileus; correlation is recommended   with the patient's symptoms.    Interval improvement in bilateral groundglass opacities in the lungs,   possibly improving pulmonary edema.      < end of copied text >      < from: TTE Echo w/Cont Complete (10.09.19 @ 15:06) >  Summary:   1. Left ventricular ejection fraction, by visual estimation, is 55 to   60%.   2. Normal global left ventricular systolic function.   3. Mildly increased LV wall thickness.   4. Spectral Doppler shows impaired relaxation pattern of left   ventricular myocardial filling (Grade I diastolic dysfunction).   5. Normal right ventricular size and function.   6. There is no evidence of pericardial effusion.   7. Mild mitral annular calcification.   8. Thickening of the anterior and posterior mitral valve leaflets.   9. Moderate to severe aortic regurgitation.  10. Flow reversal seen in the descending aorta.  11. Ascending aorta measuring 3.8 cm.  12. May Consider MUSTAPHA for evaluation of Aortic regurgitation if clinically   indicated.  13. Discussed with Dr Sin.    < end of copied text >    ASSESSMENT AND PLAN:  In summary, JONATHAN GIBSON is a 59y Male with past medical history significant for SOB. Inability to compete sentences. Symptoms were new in onset on morning of presentation. Pt reports he woke up with the symptoms. Pt reports a Cough without sputum production for several days prior. Pt denies fever, chills or constitutional symptoms. No chest pain. No palpitations. No PND or orthopnea reported as symptoms were sudden in onset. No dizziness or syncope.   Past MI with coronary stenting 1998. PAD. Thoracic aortic aneurysm repair for dissection with hx of CVA with left sided sensory loss 2001 in light of cocaine use. Moderate aortic insufficiency with dilated aortic root. No hx of COPD.    [place]/PVR 6/6/18 R 1.17. L 1/28  Echo 2/23/19  EF 45%, basilar and inferior septus, basal and mid inferior wall and basal and mid IL hypokinesis. Mild to mod MR/AR, Tr TR. dilation of the aortic root and ascending aorta measuring 4.5 and 4.1cm respectively  Carotid 2/23/19 16-49% bilat  Nuclear stress test 2/26/18 fixed IPL defect. EF 64%. No change from nuclear stress test 9/10/15    - SOB. Acute in onset. Right lung ground glass prominence suggestive of PNA with small bilateral pleural effusions. Pt denies fever, chills or constitutional symptoms. ABX as per medical. Still concern for underlying combined acute systolic and diastolic CHF. Symptoms responded to lasix 40mg daily IV, now discontinued    - CAD. Past MI with coronary stenting 1998. Abnormal ECG. Repeat ECG from 10/10 unchanged. serial cardiac enzymes negative for ACS. Pt denies active chest pain. maintain ASA for now. In the absence of chest pain and negative cardiac enzymes will pursue outpt ischemic evaluation    - Thoracic aortic aneurysm repair following acute dissection 2001 in light of cocaine use at the time, since discontinued. Chest CT this presentation with Interval development of an aneurysm at the level of the distal aortic arch and proximal descending thoracic aorta measuring 4.1 cm and 4.3 cm respectively since 1/19/2010. CT with contrast as above. Will need following serially as an outpt. Pt has been made aware of the findings. [facility]neur[facility]m [facility] in the past. CT with no recurrent Type A dissection. Type B dissection noted. BP control. BP this /78    - HTN. uncontrolled. Resume outpt po medications. Atenolol 50mg bernice, amlodipine 5mg daily, lasix 40mg daily, lisinopril 5mg daily    - HL atorvastatin 10mg daily    - Echocardiogram with normal LV function and moderate to severe AR. Will reassess AR as an outpt when PNA fully treated. Pt chronically has had AR following his aneurysm repair. Hemodynamics are stable    - PNA. ABX per medical    - Telemetry monitoring personally reviewed by me    - ECG personally reviewed by me    - radiologic imaging reviewed    - Laboratory data reviewed.    - I have personally reviewed all obtainable prior records and data including office records

## 2019-10-11 NOTE — PROGRESS NOTE ADULT - ASSESSMENT
Former smoker  Possible underlying COPD given smoking hx  H/o CAD s/p stent  Given elevated BNP with b/l GGO which could indicate pulm edema, CHF is of concern  Pt reports h/o VHD; echo with mod-sev AI  Obese  Hypoxic  S/p respiratory failure requiring BiPAP therapy; then on NCO2 and now on RA  Overall feels better  Chest CT with improvement in GGO    Rec:    Drug nebs for suspected COPD given smoking hx  Empiric abx for possible pneumonia though denies cough, fevers, and does not have leukocytosis  Continue diuresis as tolerates  Optimize cardiac function  Cardiology f/u  Observe off of steroids for now as pt without B-spasm on exam  Consider outpt PSG for possible COLLEEN  Weight loss  PFTs as outpt   Pulm f/u after d/c  Continue to follow CT for resolution of GGO as outpt  D/c planning

## 2019-10-11 NOTE — PROGRESS NOTE ADULT - ASSESSMENT
58 yo M c PMH of HTN, CAD- stent, leaky aortic valve, thoracic aortic dissection requiring emergent surgery, CVA with left sided sensory loss, colostomy for bowel ischemia s/p reversal    # Ileus  made NPO  meds changed to IVP as best  Reglan RTC  ACS consulted  IVf for hydration at very low given that we treated him for CHF initially.    # s/p Acute hypoxic respiratory failure  required BIPAP for a few hrs in ED  now on room air  CT chest- GG opacities- possible pneumonia vs pulm edema  BNP elevated, mild leucocytosis  no h/o of both CHF and COPD  non toxic, no fever  will give levaquin, oxygen, nebs  aggressive diuresis with lasix  ECHO - improved EF. mod to sev AS    # HTN  inital htn urgency in ED noted  now uncontrolled HTN  will resume home Bp meds and titrate as needed    # Pt states he overdoses on tylenol Pm and sleeps for 3 days in a row not waking for anything. and when he is awake binges on food until he throws up or has SOB. He also believes he has today's problems sec to panic attack.   suspect underlying anxiety/ depression with prior h/o addiction  appreciate psych consult  o/p psych appointment with ASAEL to be set up by SW    Lovenox     if symptoms persist or worsen then will need uptitration of antibiotics given that he is on Humira. 58 yo M c PMH of HTN, CAD- stent, leaky aortic valve, thoracic aortic dissection requiring emergent surgery, CVA with left sided sensory loss, colostomy for bowel ischemia s/p reversal    # Ileus  made NPO  meds changed to IVP as best  Reglan RTC  ACS consulted  IVf for hydration at very low given that we treated him for CHF initially.    # SANTOS  likely sec to NPO/ diuresis  now he is NPO will be on gentle hydration  will monitor renal fxn  avoid nephrotoxics  renally dose meds    # s/p Acute hypoxic respiratory failure  required BIPAP for a few hrs in ED  now on room air  CT chest- GG opacities- possible pneumonia vs pulm edema  BNP elevated, mild leucocytosis  no h/o of both CHF and COPD  non toxic, no fever  will give levaquin, oxygen, nebs  aggressive diuresis with lasix  ECHO - improved EF. mod to sev AS    # HTN  inital htn urgency in ED noted  now uncontrolled HTN  will resume home Bp meds and titrate as needed    # Pt states he overdoses on tylenol Pm and sleeps for 3 days in a row not waking for anything. and when he is awake binges on food until he throws up or has SOB. He also believes he has today's problems sec to panic attack.   suspect underlying anxiety/ depression with prior h/o addiction  appreciate psych consult  o/p psych appointment with ASAEL to be set up by SW    Lovenox     if symptoms persist or worsen then will need uptitration of antibiotics given that he is on Humira.

## 2019-10-11 NOTE — PROGRESS NOTE ADULT - SUBJECTIVE AND OBJECTIVE BOX
HEALTH ISSUES - PROBLEM Dx:    acute hypoxic resp failure- chf vs pna    INTERVAL HPI/ OVERNIGHT EVENTS:    comfortable on room air  c/o constipation    REVIEW OF SYSTEMS:    fever- constipation + nausea - vomits +    Vital Signs Last 24 Hrs  T(C): 36.8 (11 Oct 2019 07:26), Max: 36.9 (10 Oct 2019 23:22)  T(F): 98.3 (11 Oct 2019 07:26), Max: 98.5 (10 Oct 2019 23:22)  HR: 78 (11 Oct 2019 07:26) (56 - 80)  BP: 147/72 (11 Oct 2019 07:26) (133/67 - 147/78)  BP(mean): --  RR: 17 (11 Oct 2019 07:26) (17 - 18)  SpO2: 95% (11 Oct 2019 07:26) (94% - 97%)    PHYSICAL EXAM-  GENERAL: no distress, comfortable lying in bed on room air  HEAD:  Atraumatic, Normocephalic  EYES: EOMI, conjunctiva and sclera clear  NECK: No JVD, Normal thyroid  NERVOUS SYSTEM:  Alert & Oriented X 3, Motor Strength 5/5 B/L upper and lower extremities;  CHEST/LUNG: CTA [place], No rhonchi, wheezing, or rubs  HEART: Regular rate and rhythm; No murmurs, rubs, or gallops  ABDOMEN: Nontender, + distended; Bowel sounds not heard; surgical scars +   EXTREMITIES:  2+ Peripheral Pulses, No clubbing, cyanosis, or edema  SKIN: No rashes or lesions    MEDICATIONS  (STANDING):  ALBUTerol/ipratropium for Nebulization 3 milliLiter(s) Nebulizer every 6 hours  amLODIPine   Tablet 5 milliGRAM(s) Oral daily  aspirin  chewable 81 milliGRAM(s) Oral daily  ATENolol  Tablet 50 milliGRAM(s) Oral daily  enoxaparin Injectable 40 milliGRAM(s) SubCutaneous daily  furosemide    Tablet 40 milliGRAM(s) Oral daily  levETIRAcetam  IVPB 750 milliGRAM(s) IV Intermittent every 12 hours  levoFLOXacin IVPB 750 milliGRAM(s) IV Intermittent every 24 hours  levoFLOXacin IVPB      metoclopramide Injectable 10 milliGRAM(s) IV Push every 8 hours  pantoprazole  Injectable 40 milliGRAM(s) IV Push daily    MEDICATIONS  (PRN):      LABS:                        16.3   12.40 )-----------( 272      ( 11 Oct 2019 08:33 )             48.8     10-11    137  |  95<L>  |  44.0<H>  ----------------------------<  97  3.5   |  22.0  |  2.05<H>    Ca    9.9      11 Oct 2019 08:27  Phos  5.6     10-11  Mg     2.1     10-11 HEALTH ISSUES - PROBLEM Dx:    acute hypoxic resp failure- chf vs pna    INTERVAL HPI/ OVERNIGHT EVENTS:    comfortable on room air  is NPO  no abd pain  passed flatus last ngiht  no BMs  no n/v    REVIEW OF SYSTEMS:    fever- constipation + nausea - vomits - abd pain -    Vital Signs Last 24 Hrs  T(C): 36.8 (11 Oct 2019 07:26), Max: 36.9 (10 Oct 2019 23:22)  T(F): 98.3 (11 Oct 2019 07:26), Max: 98.5 (10 Oct 2019 23:22)  HR: 78 (11 Oct 2019 07:26) (56 - 80)  BP: 147/72 (11 Oct 2019 07:26) (133/67 - 147/78)  BP(mean): --  RR: 17 (11 Oct 2019 07:26) (17 - 18)  SpO2: 95% (11 Oct 2019 07:26) (94% - 97%)    PHYSICAL EXAM-  GENERAL: no distress, comfortable lying in bed on room air  HEAD:  Atraumatic, Normocephalic  EYES: EOMI, conjunctiva and sclera clear  NECK: No JVD, Normal thyroid  NERVOUS SYSTEM:  Alert & Oriented X 3, Motor Strength 5/5 B/L upper and lower extremities;  CHEST/LUNG: CTA [place], No rhonchi, wheezing, or rubs  HEART: Regular rate and rhythm; No murmurs, rubs, or gallops  ABDOMEN: Nontender, + distended; Bowel sounds not heard; surgical scars +   EXTREMITIES:  2+ Peripheral Pulses, No clubbing, cyanosis, or edema  SKIN: No rashes or lesions    MEDICATIONS  (STANDING):  ALBUTerol/ipratropium for Nebulization 3 milliLiter(s) Nebulizer every 6 hours  amLODIPine   Tablet 5 milliGRAM(s) Oral daily  aspirin  chewable 81 milliGRAM(s) Oral daily  ATENolol  Tablet 50 milliGRAM(s) Oral daily  enoxaparin Injectable 40 milliGRAM(s) SubCutaneous daily  furosemide    Tablet 40 milliGRAM(s) Oral daily  levETIRAcetam  IVPB 750 milliGRAM(s) IV Intermittent every 12 hours  levoFLOXacin IVPB 750 milliGRAM(s) IV Intermittent every 24 hours  levoFLOXacin IVPB      metoclopramide Injectable 10 milliGRAM(s) IV Push every 8 hours  pantoprazole  Injectable 40 milliGRAM(s) IV Push daily    MEDICATIONS  (PRN):      LABS:                        16.3   12.40 )-----------( 272      ( 11 Oct 2019 08:33 )             48.8     10-11    137  |  95<L>  |  44.0<H>  ----------------------------<  97  3.5   |  22.0  |  2.05<H>    Ca    9.9      11 Oct 2019 08:27  Phos  5.6     10-11  Mg     2.1     10-11

## 2019-10-11 NOTE — CHART NOTE - NSCHARTNOTEFT_GEN_A_CORE
PA NOTE-MED    Called by RN to give Lopressor 5 mg IVP X 1 now  Verified Order   Pts BP-122/60  P-81  Pushed 5mg Lopressor slowly with 5 cc NS Flush slowly  Pt tolerated well  No problems

## 2019-10-11 NOTE — PROGRESS NOTE ADULT - SUBJECTIVE AND OBJECTIVE BOX
Patient was seen and examined in the afternoon  Denies abdominal pain, nausea or vomiting  Had a BM, passing flatus  Patient was a hx of ?total colectomy with end ileostomy s/p reversal   Patient has a GI but has not followed in the last year, last colonoscopy was told that he has a "narrowing"  CT imaging reviewed, transition point just proximal to anastomosis    A/P  Patient not clinically obstructed at the time of evaluation, stricture cannot be ruled-out  -Recommend start CLD, advance diet as tolerated  -We recommend the patient follow-up with his GI once discharge for possible scope  -Bowel regimen  -No acute surgical intervention warranted, will sign off, call with questions    Patient seen and examined, d/w Dr. Leonard

## 2019-10-11 NOTE — PROGRESS NOTE ADULT - SUBJECTIVE AND OBJECTIVE BOX
PULMONARY PROGRESS NOTE      JONATHAN GIBSON  MRN-[id number]    Patient is a 59y old  Male who presents with a chief complaint of PNA, consulted r/o SBO (11 Oct 2019 17:37)      INTERVAL HPI/OVERNIGHT EVENTS:    Patient is awake and alert  Feels better  Feels he is close to baseline  Wants to go home    MEDICATIONS  (STANDING):  ALBUTerol/ipratropium for Nebulization 3 milliLiter(s) Nebulizer every 6 hours  aspirin  chewable 81 milliGRAM(s) Oral daily  enalaprilat Injectable 5 milliGRAM(s) IV Push every 6 hours  enoxaparin Injectable 40 milliGRAM(s) SubCutaneous daily  lactated ringers. 1000 milliLiter(s) (60 mL/Hr) IV Continuous <Continuous>  levETIRAcetam  IVPB 750 milliGRAM(s) IV Intermittent every 12 hours  levoFLOXacin IVPB 750 milliGRAM(s) IV Intermittent every 24 hours  levoFLOXacin IVPB      metoclopramide Injectable 10 milliGRAM(s) IV Push every 8 hours  metoprolol tartrate Injectable 5 milliGRAM(s) IV Push every 6 hours  pantoprazole  Injectable 40 milliGRAM(s) IV Push daily      MEDICATIONS  (PRN):  hydrALAZINE Injectable 5 milliGRAM(s) IV Push every 6 hours PRN For  or more      Allergies    penicillin (Unknown)    Intolerances        PAST MEDICAL & SURGICAL HISTORY:  CVA (cerebral vascular accident)  CHF (congestive heart failure)  H/O colectomy  Aorta disorder        REVIEW OF SYSTEMS:    CONSTITUTIONAL:  No distress    HEENT:  Eyes:  No diplopia or blurred vision. ENT:  No earache, sore throat or runny nose.    CARDIOVASCULAR:  No pressure, squeezing, tightness, heaviness or aching about the chest; no palpitations.    RESPIRATORY:  No cough, shortness of breath, PND or orthopnea. Mild SOBOE    GASTROINTESTINAL:  No nausea, vomiting or diarrhea.    GENITOURINARY:  No dysuria, frequency or urgency.    NEUROLOGIC:  No paresthesias, fasciculations, seizures or weakness.    PSYCHIATRIC:  No disorder of thought or mood.    Vital Signs Last 24 Hrs  T(C): 37.2 (11 Oct 2019 15:43), Max: 37.2 (11 Oct 2019 15:43)  T(F): 99 (11 Oct 2019 15:43), Max: 99 (11 Oct 2019 15:43)  HR: 69 (11 Oct 2019 15:43) (56 - 80)  BP: 139/77 (11 Oct 2019 15:43) (133/67 - 147/78)  BP(mean): --  RR: 16 (11 Oct 2019 15:43) (16 - 18)  SpO2: 98% (11 Oct 2019 15:43) (95% - 98%)    PHYSICAL EXAMINATION:    GENERAL: The patient is awake and alert in no apparent distress.     HEENT: Head is normocephalic and atraumatic. Extraocular muscles are intact. Mucous membranes are moist.    NECK: Supple.    LUNGS: Clear to auscultation without wheezing, rales or rhonchi; respirations unlabored    HEART: Regular rate and rhythm without murmur.    ABDOMEN: Soft, nontender, and nondistended.      EXTREMITIES: Without any cyanosis, clubbing, rash, lesions or edema.    NEUROLOGIC: Grossly intact.    LABS:                        16.3   12.40 )-----------( 272      ( 11 Oct 2019 08:33 )             48.8     10-11    137  |  95<L>  |  44.0<H>  ----------------------------<  97  3.5   |  22.0  |  2.05<H>    Ca    9.9      11 Oct 2019 08:27  Phos  5.6     10-11  Mg     2.1     10-11        CARDIAC MARKERS ( 10 Oct 2019 15:38 )  x     / <0.01 ng/mL / x     / x     / x      CARDIAC MARKERS ( 10 Oct 2019 08:19 )  x     / <0.01 ng/mL / x     / x     / x            RADIOLOGY & ADDITIONAL STUDIES:       EXAM:  CT ANGIO ABD PELV (W)AW IC                         EXAM:  CT ANGIO CHEST (W)AW IC                          PROCEDURE DATE:  10/10/2019          INTERPRETATION:  CLINICAL INFORMATION: Aortic aneurysm on prior CT.   History of dissection repair.    COMPARISON: CT chest 10/8/2019, CT chest/abdomen/pelvis 1/19/2010 and CT   abdomen/pelvis 6/23/2011    PROCEDURE:   CT Angiography of the Chest, Abdomen and Pelvis.   Precontrast imaging was performed through the chest followed by arterial   phase imaging of the chest, abdomen and pelvis.  Intravenous contrast: 94 ml Omnipaque 350.   Oral contrast: None.  Sagittal and coronal reformats were performed as well as 3D (MIP)   reconstructions.    FINDINGS:    CHEST:     LUNGS AND LARGE AIRWAYS: Patent central airways. Linear atelectasis or   scarring at the lung bases. Interval improvement in bilateral groundglass   opacities in the lungs.  PLEURA: No pleural effusion.  VESSELS: Graft material is seen within the ascending thoracic aorta   consistent with a type A aortic dissection repair. There is a common   origin of the right brachiocephalic and left common carotid arteries, an   anatomic variant. Dissection flap extends into the common origin and   extends throughout the right brachiocephalic artery and into the proximal   right subclavian artery. Dissection flap also extends into the left   subclavian artery. Comparison with the prior chest CT 1/19/2010 is   limited secondary to streak artifact in the region of the aortic arch,   however the dissection flap appears to extend into the right   brachiocephalic and left subclavian arteries on the prior study. The   aortic root is similar in appearance to 1/19/2010. There is dilatation of   the distal aortic arch/proximal descending thoracic aorta measuring 4.2   cm; no prior postoperative study is available for comparison.  HEART: Heart size is normal. No pericardial effusion.  MEDIASTINUM AND KAYLA: No lymphadenopathy.  CHEST WALL AND LOWER NECK: No enlarged axillary lymph nodes.    ABDOMEN AND PELVIS:    LIVER: Within normal limits.  BILE DUCTS: Normal caliber.  GALLBLADDER: Within normal limits.  SPLEEN: Within normal limits.  PANCREAS: Within normal limits.  ADRENALS: Within normal limits.  KIDNEYS/URETERS: Normal size. Symmetric enhancement. No hydronephrosis.    BLADDER: Underdistended, limiting evaluation.  REPRODUCTIVE ORGANS: The prostate gland is not enlarged.    BOWEL: Status post colectomy with a rectal stump. The small bowel is   dilated with air-fluid levels measuring up to 3.8 cm in caliber with   collapse of the distal most small bowel in the right lower quadrant   proximal to the anastomosis to the rectum.   PERITONEUM: No ascites.  VESSELS: The celiac and superior mesenteric arteries arise from the true   lumen and the dissection flap is located at the origins of both renal   arteries. Dissection flap extends into the proximal left common iliac   artery and extends into the proximal external iliac artery on the right.  RETROPERITONEUM/LYMPH NODES: No lymphadenopathy.    ABDOMINAL WALL: Post surgical changes in the midline of the anterior   abdominal wall. Subcutaneous droplets of air in the anterior abdominal   wall which can be related to subcutaneous injections. Bilateral   fat-containing inguinal hernias, larger on the left.  BONES: Sternotomy. Degenerative changes in the spine.    IMPRESSION:     Status post repair of a type A aortic dissection with the dissection flap   extending into a common origin of the right brachiocephalic and left   common carotid arteries and into the left subclavian artery; comparison   with prior preoperative chest CT 1/19/2010 is limited in the region of   the great vessels, however dissection flap appears to extend into the   right brachiocephalic and left subclavian arteries on the prior study.    Dissection flap extends into the proximal left common iliac and right   external iliac arteries; distal extent of the dissection flap has   increased since 1/19/2010.    Dilated distal aortic arch/proximal descending thoracic aorta measuring   4.2 cm.    Status post colectomy with dilated loops of small bowel with collapse of   the distal most aspect of the small bowel proximal to the rectal   anastomosis, possibly an obstruction or ileus; correlation is recommended   with the patient's symptoms.    Interval improvement in bilateral groundglass opacities in the lungs,   possibly improving pulmonary edema.    Comparison is recommended with a prior postoperative CT   chest/abdomen/pelvis if available to assess for stability of the   dissection.    The findings were discussed with Dr. Simmons at 3:16 PM on 10/10/2019.          DARRICK NICOLE   This document has been electronically signed. Oct 10 2019  3:26PM            ECHO:    Summary:   1. Left ventricular ejection fraction, by visual estimation, is 55 to   60%.   2. Normal global left ventricular systolic function.   3. Mildly increased LV wall thickness.   4. Spectral Doppler shows impaired relaxation pattern of left   ventricular myocardial filling (Grade I diastolic dysfunction).   5. Normal right ventricular size and function.   6. There is no evidence of pericardial effusion.   7. Mild mitral annular calcification.   8. Thickening of the anterior and posterior mitral valve leaflets.   9. Moderate to severe aortic regurgitation.  10. Flow reversal seen in the descending aorta.  11. Ascending aorta measuring 3.8 cm.  12. May Consider MUSTAPHA for evaluation of Aortic regurgitation if clinically   indicated.  13. Discussed with Dr Sin.    MD Attila Electronically signed on 10/10/2019 at 9:11:05 AM

## 2019-10-12 PROCEDURE — 99232 SBSQ HOSP IP/OBS MODERATE 35: CPT

## 2019-10-12 PROCEDURE — 74018 RADEX ABDOMEN 1 VIEW: CPT | Mod: 26

## 2019-10-12 RX ORDER — ATENOLOL 25 MG/1
50 TABLET ORAL DAILY
Refills: 0 | Status: DISCONTINUED | OUTPATIENT
Start: 2019-10-12 | End: 2019-10-15

## 2019-10-12 RX ORDER — AMLODIPINE BESYLATE 2.5 MG/1
5 TABLET ORAL DAILY
Refills: 0 | Status: DISCONTINUED | OUTPATIENT
Start: 2019-10-12 | End: 2019-10-15

## 2019-10-12 RX ADMIN — ENOXAPARIN SODIUM 40 MILLIGRAM(S): 100 INJECTION SUBCUTANEOUS at 11:45

## 2019-10-12 RX ADMIN — Medication 5 MILLIGRAM(S): at 09:23

## 2019-10-12 RX ADMIN — Medication 5 MILLIGRAM(S): at 06:28

## 2019-10-12 RX ADMIN — Medication 10 MILLIGRAM(S): at 20:31

## 2019-10-12 RX ADMIN — Medication 81 MILLIGRAM(S): at 11:46

## 2019-10-12 RX ADMIN — Medication 10 MILLIGRAM(S): at 14:34

## 2019-10-12 RX ADMIN — Medication 5 MILLIGRAM(S): at 11:46

## 2019-10-12 RX ADMIN — LEVETIRACETAM 400 MILLIGRAM(S): 250 TABLET, FILM COATED ORAL at 18:18

## 2019-10-12 RX ADMIN — ATENOLOL 50 MILLIGRAM(S): 25 TABLET ORAL at 16:40

## 2019-10-12 RX ADMIN — AMLODIPINE BESYLATE 5 MILLIGRAM(S): 2.5 TABLET ORAL at 14:34

## 2019-10-12 RX ADMIN — LEVETIRACETAM 400 MILLIGRAM(S): 250 TABLET, FILM COATED ORAL at 06:27

## 2019-10-12 RX ADMIN — SODIUM CHLORIDE 60 MILLILITER(S): 9 INJECTION, SOLUTION INTRAVENOUS at 11:47

## 2019-10-12 RX ADMIN — PANTOPRAZOLE SODIUM 40 MILLIGRAM(S): 20 TABLET, DELAYED RELEASE ORAL at 11:46

## 2019-10-12 RX ADMIN — Medication 10 MILLIGRAM(S): at 06:28

## 2019-10-12 NOTE — CHART NOTE - NSCHARTNOTEFT_GEN_A_CORE
Called by RN to push dose of Lopressor 5mg IVP. Patient usually takes Atenolol 50mg daily, but is currently admitted with ileus and NPO. Resting comfortably in bed with no acute complaints.   Lopressor slow push administered as per MD orders as per protocol, flushed with NS.    HR prior to Lopressor: 78  BP prior to Lopressor: 148/78      Patient tolerated well without complaints.   Will continue to monitor, RN to call if any changes.

## 2019-10-12 NOTE — PROGRESS NOTE ADULT - SUBJECTIVE AND OBJECTIVE BOX
PULMONARY PROGRESS NOTE      JONATHAN GIBSON  MRN-[id number]    Patient is a 59y old  Male who presents with a chief complaint of SOB (11 Oct 2019 17:53)      INTERVAL HPI/OVERNIGHT EVENTS:    Pt is awake and alert  Close to baseline  Less SOB    MEDICATIONS  (STANDING):  ALBUTerol/ipratropium for Nebulization 3 milliLiter(s) Nebulizer every 6 hours  amLODIPine   Tablet 5 milliGRAM(s) Oral daily  aspirin  chewable 81 milliGRAM(s) Oral daily  ATENolol  Tablet 50 milliGRAM(s) Oral daily  enoxaparin Injectable 40 milliGRAM(s) SubCutaneous daily  lactated ringers. 1000 milliLiter(s) (60 mL/Hr) IV Continuous <Continuous>  levETIRAcetam  IVPB 750 milliGRAM(s) IV Intermittent every 12 hours  levoFLOXacin IVPB 750 milliGRAM(s) IV Intermittent every 24 hours  levoFLOXacin IVPB      metoclopramide Injectable 10 milliGRAM(s) IV Push every 8 hours  pantoprazole  Injectable 40 milliGRAM(s) IV Push daily      MEDICATIONS  (PRN):  hydrALAZINE Injectable 5 milliGRAM(s) IV Push every 6 hours PRN For  or more      Allergies    penicillin (Unknown)    Intolerances        PAST MEDICAL & SURGICAL HISTORY:  CVA (cerebral vascular accident)  CHF (congestive heart failure)  H/O colectomy  Aorta disorder        REVIEW OF SYSTEMS:    CONSTITUTIONAL:  No distress    HEENT:  Eyes:  No diplopia or blurred vision. ENT:  No earache, sore throat or runny nose.    CARDIOVASCULAR:  No pressure, squeezing, tightness, heaviness or aching about the chest; no palpitations.    RESPIRATORY:  Improved cough, shortness of breath, no PND or orthopnea. Mild SOBOE    GASTROINTESTINAL:  No nausea, vomiting or diarrhea.    GENITOURINARY:  No dysuria, frequency or urgency.    NEUROLOGIC:  No paresthesias, fasciculations, seizures or weakness.    PSYCHIATRIC:  No disorder of thought or mood.    Vital Signs Last 24 Hrs  T(C): 36.4 (12 Oct 2019 14:53), Max: 37.2 (11 Oct 2019 15:43)  T(F): 97.5 (12 Oct 2019 14:53), Max: 99 (11 Oct 2019 15:43)  HR: 81 (12 Oct 2019 14:53) (61 - 81)  BP: 156/74 (12 Oct 2019 14:53) (122/60 - 156/74)  BP(mean): --  RR: 19 (12 Oct 2019 14:53) (16 - 19)  SpO2: 96% (12 Oct 2019 14:53) (95% - 99%)    PHYSICAL EXAMINATION:    GENERAL: The patient is awake and alert in no apparent distress. Obese    HEENT: Head is normocephalic and atraumatic. Extraocular muscles are intact. Mucous membranes are moist.    NECK: Supple.    LUNGS: Clear to auscultation without wheezing, rales or rhonchi; respirations unlabored    HEART: Regular rate and rhythm without murmur.    ABDOMEN: Soft, nontender, and nondistended.      EXTREMITIES: Without any cyanosis, clubbing, rash, lesions or edema.    NEUROLOGIC: Grossly intact.    LABS:                        16.3   12.40 )-----------( 272      ( 11 Oct 2019 08:33 )             48.8     10-11    137  |  95<L>  |  44.0<H>  ----------------------------<  97  3.5   |  22.0  |  2.05<H>    Ca    9.9      11 Oct 2019 08:27  Phos  5.6     10-11  Mg     2.1     10-11            CARDIAC MARKERS ( 10 Oct 2019 15:38 )  x     / <0.01 ng/mL / x     / x     / x              RADIOLOGY & ADDITIONAL STUDIES:       EXAM:  CT ANGIO ABD PELV (W)AW IC                         EXAM:  CT ANGIO CHEST (W)AW IC                          PROCEDURE DATE:  10/10/2019          INTERPRETATION:  CLINICAL INFORMATION: Aortic aneurysm on prior CT.   History of dissection repair.    COMPARISON: CT chest 10/8/2019, CT chest/abdomen/pelvis 1/19/2010 and CT   abdomen/pelvis 6/23/2011    PROCEDURE:   CT Angiography of the Chest, Abdomen and Pelvis.   Precontrast imaging was performed through the chest followed by arterial   phase imaging of the chest, abdomen and pelvis.  Intravenous contrast: 94 ml Omnipaque 350.   Oral contrast: None.  Sagittal and coronal reformats were performed as well as 3D (MIP)   reconstructions.    FINDINGS:    CHEST:     LUNGS AND LARGE AIRWAYS: Patent central airways. Linear atelectasis or   scarring at the lung bases. Interval improvement in bilateral groundglass   opacities in the lungs.  PLEURA: No pleural effusion.  VESSELS: Graft material is seen within the ascending thoracic aorta   consistent with a type A aortic dissection repair. There is a common   origin of the right brachiocephalic and left common carotid arteries, an   anatomic variant. Dissection flap extends into the common origin and   extends throughout the right brachiocephalic artery and into the proximal   right subclavian artery. Dissection flap also extends into the left   subclavian artery. Comparison with the prior chest CT 1/19/2010 is   limited secondary to streak artifact in the region of the aortic arch,   however the dissection flap appears to extend into the right   brachiocephalic and left subclavian arteries on the prior study. The   aortic root is similar in appearance to 1/19/2010. There is dilatation of   the distal aortic arch/proximal descending thoracic aorta measuring 4.2   cm; no prior postoperative study is available for comparison.  HEART: Heart size is normal. No pericardial effusion.  MEDIASTINUM AND KAYLA: No lymphadenopathy.  CHEST WALL AND LOWER NECK: No enlarged axillary lymph nodes.    ABDOMEN AND PELVIS:    LIVER: Within normal limits.  BILE DUCTS: Normal caliber.  GALLBLADDER: Within normal limits.  SPLEEN: Within normal limits.  PANCREAS: Within normal limits.  ADRENALS: Within normal limits.  KIDNEYS/URETERS: Normal size. Symmetric enhancement. No hydronephrosis.    BLADDER: Underdistended, limiting evaluation.  REPRODUCTIVE ORGANS: The prostate gland is not enlarged.    BOWEL: Status post colectomy with a rectal stump. The small bowel is   dilated with air-fluid levels measuring up to 3.8 cm in caliber with   collapse of the distal most small bowel in the right lower quadrant   proximal to the anastomosis to the rectum.   PERITONEUM: No ascites.  VESSELS: The celiac and superior mesenteric arteries arise from the true   lumen and the dissection flap is located at the origins of both renal   arteries. Dissection flap extends into the proximal left common iliac   artery and extends into the proximal external iliac artery on the right.  RETROPERITONEUM/LYMPH NODES: No lymphadenopathy.    ABDOMINAL WALL: Post surgical changes in the midline of the anterior   abdominal wall. Subcutaneous droplets of air in the anterior abdominal   wall which can be related to subcutaneous injections. Bilateral   fat-containing inguinal hernias, larger on the left.  BONES: Sternotomy. Degenerative changes in the spine.    IMPRESSION:     Status post repair of a type A aortic dissection with the dissection flap   extending into a common origin of the right brachiocephalic and left   common carotid arteries and into the left subclavian artery; comparison   with prior preoperative chest CT 1/19/2010 is limited in the region of   the great vessels, however dissection flap appears to extend into the   right brachiocephalic and left subclavian arteries on the prior study.    Dissection flap extends into the proximal left common iliac and right   external iliac arteries; distal extent of the dissection flap has   increased since 1/19/2010.    Dilated distal aortic arch/proximal descending thoracic aorta measuring   4.2 cm.    Status post colectomy with dilated loops of small bowel with collapse of   the distal most aspect of the small bowel proximal to the rectal   anastomosis, possibly an obstruction or ileus; correlation is recommended   with the patient's symptoms.    Interval improvement in bilateral groundglass opacities in the lungs,   possibly improving pulmonary edema.    Comparison is recommended with a prior postoperative CT   chest/abdomen/pelvis if available to assess for stability of the   dissection.    The findings were discussed with Dr. Simmons at 3:16 PM on 10/10/2019.                DARRICK NICOLE   This document has been electronically signed. Oct 10 2019  3:26PM            ECHO:      Summary:   1. Left ventricular ejection fraction, by visual estimation, is 55 to   60%.   2. Normal global left ventricular systolic function.   3. Mildly increased LV wall thickness.   4. Spectral Doppler shows impaired relaxation pattern of left   ventricular myocardial filling (Grade I diastolic dysfunction).   5. Normal right ventricular size and function.   6. There is no evidence of pericardial effusion.   7. Mild mitral annular calcification.   8. Thickening of the anterior and posterior mitral valve leaflets.   9. Moderate to severe aortic regurgitation.  10. Flow reversal seen in the descending aorta.  11. Ascending aorta measuring 3.8 cm.  12. May Consider MUSTAPHA for evaluation of Aortic regurgitation if clinically   indicated.  13. Discussed with Dr Sin.    MD Attila Electronically signed on 10/10/2019 at 9:11:05 AM

## 2019-10-12 NOTE — PROGRESS NOTE ADULT - SUBJECTIVE AND OBJECTIVE BOX
JONATHAN GIBSON  ----------------------------------------  The patient was seen and evaluated for   The patient is in no acute distress.  Denied any chest pain, palpitations, dyspnea, or abdominal pain.    Vital Signs Last 24 Hrs  T(C): 36.8 (12 Oct 2019 07:19), Max: 37.2 (11 Oct 2019 15:43)  T(F): 98.2 (12 Oct 2019 07:19), Max: 99 (11 Oct 2019 15:43)  HR: 79 (12 Oct 2019 11:44) (61 - 81)  BP: 148/74 (12 Oct 2019 11:58) (122/60 - 150/72)  BP(mean): --  RR: 18 (12 Oct 2019 07:19) (16 - 18)  SpO2: 95% (12 Oct 2019 07:19) (95% - 99%)    PHYSICAL EXAMINATION:  ----------------------------------------  General appearance: No acute distress, Awake, Alert  HEENT: Normocephalic, Atraumatic, Conjunctiva clear, EOMI  Neck: Supple, No JVD, No tenderness  Lungs: Breath sound equal bilaterally, No wheezes, No rales  Cardiovascular: S1S2, Regular rhythm  Abdomen: Soft, Nontender, Nondistended, No guarding/rebound, Positive bowel sounds  Extremities: No clubbing, No cyanosis, No edema, No calf tenderness  Neuro: Strength equal bilaterally, No tremors  Psychiatric: Appropriate mood, Normal affect    LABORATORY STUDIES:  ----------------------------------------             16.3   12.40 )-----------( 272      ( 11 Oct 2019 08:33 )             48.8     10-11    137  |  95<L>  |  44.0<H>  ----------------------------<  97  3.5   |  22.0  |  2.05<H>    Ca    9.9      11 Oct 2019 08:27  Phos  5.6     10-11  Mg     2.1     10-11    CARDIAC MARKERS ( 10 Oct 2019 15:38 )  x     / <0.01 ng/mL / x     / x     / x        MEDICATIONS  (STANDING):  ALBUTerol/ipratropium for Nebulization 3 milliLiter(s) Nebulizer every 6 hours  amLODIPine   Tablet 5 milliGRAM(s) Oral daily  aspirin  chewable 81 milliGRAM(s) Oral daily  ATENolol  Tablet 50 milliGRAM(s) Oral daily  enoxaparin Injectable 40 milliGRAM(s) SubCutaneous daily  lactated ringers. 1000 milliLiter(s) (60 mL/Hr) IV Continuous <Continuous>  levETIRAcetam  IVPB 750 milliGRAM(s) IV Intermittent every 12 hours  levoFLOXacin IVPB 750 milliGRAM(s) IV Intermittent every 24 hours  levoFLOXacin IVPB      metoclopramide Injectable 10 milliGRAM(s) IV Push every 8 hours  pantoprazole  Injectable 40 milliGRAM(s) IV Push daily    MEDICATIONS  (PRN):  hydrALAZINE Injectable 5 milliGRAM(s) IV Push every 6 hours PRN For  or more      ASSESSMENT / PLAN:  ----------------------------------------  60 yo M c PMH of HTN, CAD- stent, leaky aortic valve, thoracic aortic dissection requiring emergent surgery, CVA with left sided sensory loss, colostomy for bowel ischemia s/p reversal    Ileus - Abdominal examination was benign. Surgery follow up noted. To initiate clear liquid diet and monitor.    Acute kidney injury - Enalaprilat to be discontinued. Repeat laboratory studies ordered to monitor.    Chronic diastolic heart failure - Monitoring while off furosemide.    Acute hypoxic respiratory failure - Improved. On levofloxacin. Inhaled bronchodilator therapy as needed.    Hypertension - Close blood pressure monitoring. On amlodipine and atenolol JONATHAN GIBSON  ----------------------------------------  The patient was seen and evaluated for ileus.  The patient is in no acute distress.  Denied any chest pain, palpitations, dyspnea, or abdominal pain.    Vital Signs Last 24 Hrs  T(C): 36.8 (12 Oct 2019 07:19), Max: 37.2 (11 Oct 2019 15:43)  T(F): 98.2 (12 Oct 2019 07:19), Max: 99 (11 Oct 2019 15:43)  HR: 79 (12 Oct 2019 11:44) (61 - 81)  BP: 148/74 (12 Oct 2019 11:58) (122/60 - 150/72)  BP(mean): --  RR: 18 (12 Oct 2019 07:19) (16 - 18)  SpO2: 95% (12 Oct 2019 07:19) (95% - 99%)    PHYSICAL EXAMINATION:  ----------------------------------------  General appearance: No acute distress, Awake, Alert  HEENT: Normocephalic, Atraumatic, Conjunctiva clear, EOMI  Neck: Supple, No JVD, No tenderness  Lungs: Breath sound equal bilaterally, No wheezes, No rales  Cardiovascular: S1S2, Regular rhythm  Abdomen: Soft, Nontender, Nondistended, No guarding/rebound, Positive bowel sounds  Extremities: No clubbing, No cyanosis, No edema, No calf tenderness  Neuro: Strength equal bilaterally, No tremors  Psychiatric: Appropriate mood, Normal affect    LABORATORY STUDIES:  ----------------------------------------             16.3   12.40 )-----------( 272      ( 11 Oct 2019 08:33 )             48.8     10-11    137  |  95<L>  |  44.0<H>  ----------------------------<  97  3.5   |  22.0  |  2.05<H>    Ca    9.9      11 Oct 2019 08:27  Phos  5.6     10-11  Mg     2.1     10-11    CARDIAC MARKERS ( 10 Oct 2019 15:38 )  x     / <0.01 ng/mL / x     / x     / x        MEDICATIONS  (STANDING):  ALBUTerol/ipratropium for Nebulization 3 milliLiter(s) Nebulizer every 6 hours  amLODIPine   Tablet 5 milliGRAM(s) Oral daily  aspirin  chewable 81 milliGRAM(s) Oral daily  ATENolol  Tablet 50 milliGRAM(s) Oral daily  enoxaparin Injectable 40 milliGRAM(s) SubCutaneous daily  lactated ringers. 1000 milliLiter(s) (60 mL/Hr) IV Continuous <Continuous>  levETIRAcetam  IVPB 750 milliGRAM(s) IV Intermittent every 12 hours  levoFLOXacin IVPB 750 milliGRAM(s) IV Intermittent every 24 hours  levoFLOXacin IVPB      metoclopramide Injectable 10 milliGRAM(s) IV Push every 8 hours  pantoprazole  Injectable 40 milliGRAM(s) IV Push daily    MEDICATIONS  (PRN):  hydrALAZINE Injectable 5 milliGRAM(s) IV Push every 6 hours PRN For  or more      ASSESSMENT / PLAN:  ----------------------------------------  58 yo M c PMH of HTN, CAD- stent, leaky aortic valve, thoracic aortic dissection requiring emergent surgery, CVA with left sided sensory loss, colostomy for bowel ischemia s/p reversal    Ileus - Abdominal examination was benign. Surgery follow up noted. To initiate clear liquid diet and monitor.    Acute kidney injury - Enalaprilat to be discontinued. Repeat laboratory studies ordered to monitor.    Chronic diastolic heart failure - Monitoring while off furosemide.    Acute hypoxic respiratory failure - Improved. On levofloxacin. Inhaled bronchodilator therapy as needed.    Hypertension - Close blood pressure monitoring. On amlodipine and atenolol

## 2019-10-12 NOTE — PROGRESS NOTE ADULT - ASSESSMENT
Former smoker  Possible underlying COPD given smoking hx  H/o CAD s/p stent  Given elevated BNP with b/l GGO which could indicate pulm edema, CHF is of concern  Pt reports h/o VHD; echo with mod-sev AI  Obese  Hypoxic  S/p respiratory failure requiring BiPAP therapy; then on NCO2 and now on RA and no longer using BiPAP  Overall feels better  Chest CT with improvement in GGO    Rec:    Drug nebs for suspected COPD given smoking hx  Empiric abx for possible pneumonia though denies cough, fevers, and does not have leukocytosis  Continue diuresis as tolerates  Optimize cardiac function  Cardiology f/u  Observe off of steroids for now as pt without B-spasm on exam  Consider outpt PSG for possible COLLEEN  Weight loss  PFTs as outpt   Pulm f/u after d/c  Continue to follow CT for resolution of GGO as outpt  D/c planning  Little else to add - Recall prn

## 2019-10-13 LAB
ANION GAP SERPL CALC-SCNC: 15 MMOL/L — SIGNIFICANT CHANGE UP (ref 5–17)
BUN SERPL-MCNC: 42 MG/DL — HIGH (ref 8–20)
CALCIUM SERPL-MCNC: 9.2 MG/DL — SIGNIFICANT CHANGE UP (ref 8.6–10.2)
CHLORIDE SERPL-SCNC: 101 MMOL/L — SIGNIFICANT CHANGE UP (ref 98–107)
CO2 SERPL-SCNC: 24 MMOL/L — SIGNIFICANT CHANGE UP (ref 22–29)
CREAT SERPL-MCNC: 1.23 MG/DL — SIGNIFICANT CHANGE UP (ref 0.5–1.3)
GLUCOSE SERPL-MCNC: 99 MG/DL — SIGNIFICANT CHANGE UP (ref 70–115)
HCT VFR BLD CALC: 45.7 % — SIGNIFICANT CHANGE UP (ref 39–50)
HGB BLD-MCNC: 14.8 G/DL — SIGNIFICANT CHANGE UP (ref 13–17)
MCHC RBC-ENTMCNC: 30.2 PG — SIGNIFICANT CHANGE UP (ref 27–34)
MCHC RBC-ENTMCNC: 32.4 GM/DL — SIGNIFICANT CHANGE UP (ref 32–36)
MCV RBC AUTO: 93.3 FL — SIGNIFICANT CHANGE UP (ref 80–100)
PLATELET # BLD AUTO: 224 K/UL — SIGNIFICANT CHANGE UP (ref 150–400)
POTASSIUM SERPL-MCNC: 4 MMOL/L — SIGNIFICANT CHANGE UP (ref 3.5–5.3)
POTASSIUM SERPL-SCNC: 4 MMOL/L — SIGNIFICANT CHANGE UP (ref 3.5–5.3)
RBC # BLD: 4.9 M/UL — SIGNIFICANT CHANGE UP (ref 4.2–5.8)
RBC # FLD: 12.6 % — SIGNIFICANT CHANGE UP (ref 10.3–14.5)
SODIUM SERPL-SCNC: 140 MMOL/L — SIGNIFICANT CHANGE UP (ref 135–145)
WBC # BLD: 11.07 K/UL — HIGH (ref 3.8–10.5)
WBC # FLD AUTO: 11.07 K/UL — HIGH (ref 3.8–10.5)

## 2019-10-13 PROCEDURE — 99232 SBSQ HOSP IP/OBS MODERATE 35: CPT

## 2019-10-13 RX ORDER — LANOLIN ALCOHOL/MO/W.PET/CERES
3 CREAM (GRAM) TOPICAL ONCE
Refills: 0 | Status: COMPLETED | OUTPATIENT
Start: 2019-10-13 | End: 2019-10-13

## 2019-10-13 RX ADMIN — ATENOLOL 50 MILLIGRAM(S): 25 TABLET ORAL at 05:52

## 2019-10-13 RX ADMIN — Medication 10 MILLIGRAM(S): at 05:52

## 2019-10-13 RX ADMIN — Medication 81 MILLIGRAM(S): at 12:07

## 2019-10-13 RX ADMIN — LEVETIRACETAM 400 MILLIGRAM(S): 250 TABLET, FILM COATED ORAL at 05:52

## 2019-10-13 RX ADMIN — AMLODIPINE BESYLATE 5 MILLIGRAM(S): 2.5 TABLET ORAL at 05:52

## 2019-10-13 RX ADMIN — LEVETIRACETAM 400 MILLIGRAM(S): 250 TABLET, FILM COATED ORAL at 18:40

## 2019-10-13 RX ADMIN — Medication 3 MILLIGRAM(S): at 23:42

## 2019-10-13 RX ADMIN — Medication 10 MILLIGRAM(S): at 16:37

## 2019-10-13 RX ADMIN — ENOXAPARIN SODIUM 40 MILLIGRAM(S): 100 INJECTION SUBCUTANEOUS at 12:06

## 2019-10-13 RX ADMIN — PANTOPRAZOLE SODIUM 40 MILLIGRAM(S): 20 TABLET, DELAYED RELEASE ORAL at 12:07

## 2019-10-13 NOTE — DIETITIAN INITIAL EVALUATION ADULT. - OTHER INFO
As per hospitalist note:   58 yo M c PMH of HTN, CAD- stent, leaky aortic valve, thoracic aortic dissection requiring emergent surgery, CVA with left sided sensory loss, colostomy for bowel ischemia s/p reversal  Ileus -   Acute kidney injury -   Chronic diastolic heart failure -   Acute hypoxic respiratory failure -  Hypertension    Pt reports tolerating full liquid diet . Pt was educated previously on a low sodium diet, but reports that he was not following it. Diet ed reinforced.

## 2019-10-13 NOTE — DIETITIAN INITIAL EVALUATION ADULT. - PERTINENT LABORATORY DATA
10-13 Na140 mmol/L Glu 99 mg/dL K+ 4.0 mmol/L Cr  1.23 mg/dL BUN 42.0 mg/dL<H> Phos n/a   Alb n/a   PAB n/a

## 2019-10-13 NOTE — PROGRESS NOTE ADULT - SUBJECTIVE AND OBJECTIVE BOX
JONATHAN GIBSON  ----------------------------------------  The patient was seen and evaluated for ileus.  The patient is in no acute distress.  Denied any chest pain, palpitations, dyspnea, or abdominal pain.    Vital Signs Last 24 Hrs  T(C): 36.5 (13 Oct 2019 07:23), Max: 36.8 (12 Oct 2019 23:08)  T(F): 97.7 (13 Oct 2019 07:23), Max: 98.2 (12 Oct 2019 23:08)  HR: 72 (13 Oct 2019 07:23) (68 - 90)  BP: 149/66 (13 Oct 2019 07:23) (125/61 - 156/74)  BP(mean): --  RR: 18 (13 Oct 2019 07:23) (18 - 19)  SpO2: 96% (13 Oct 2019 07:23) (96% - 96%)    PHYSICAL EXAMINATION:  ----------------------------------------  General appearance: No acute distress, Awake, Alert  HEENT: Normocephalic, Atraumatic, Conjunctiva clear, EOMI  Neck: Supple, No JVD, No tenderness  Lungs: Breath sound equal bilaterally, No wheezes, No rales  Cardiovascular: S1S2, Regular rhythm  Abdomen: Soft, Nontender, Nondistended, No guarding/rebound, Positive bowel sounds  Extremities: No clubbing, No cyanosis, No edema, No calf tenderness  Neuro: Strength equal bilaterally, No tremors  Psychiatric: Appropriate mood, Normal affect    LABORATORY STUDIES:  ----------------------------------------             14.8   11.07 )-----------( 224      ( 13 Oct 2019 08:20 )             45.7     10-13    140  |  101  |  42.0<H>  ----------------------------<  99  4.0   |  24.0  |  1.23    Ca    9.2      13 Oct 2019 08:20    MEDICATIONS  (STANDING):  ALBUTerol/ipratropium for Nebulization 3 milliLiter(s) Nebulizer every 6 hours  amLODIPine   Tablet 5 milliGRAM(s) Oral daily  aspirin  chewable 81 milliGRAM(s) Oral daily  ATENolol  Tablet 50 milliGRAM(s) Oral daily  enoxaparin Injectable 40 milliGRAM(s) SubCutaneous daily  levETIRAcetam  IVPB 750 milliGRAM(s) IV Intermittent every 12 hours  levoFLOXacin IVPB 750 milliGRAM(s) IV Intermittent every 24 hours  levoFLOXacin IVPB      metoclopramide Injectable 10 milliGRAM(s) IV Push every 8 hours  pantoprazole  Injectable 40 milliGRAM(s) IV Push daily    MEDICATIONS  (PRN):  hydrALAZINE Injectable 5 milliGRAM(s) IV Push every 6 hours PRN For  or more      ASSESSMENT / PLAN:  ----------------------------------------  Ileus - Abdominal examination was benign. Surgery follow up noted. Had initial abdominal discomfort with clear liquids which resolved with passing gas. Tolerating clear liquids overnight. To advance to full liquids.    Acute kidney injury - Enalaprilat discontinued yesterday. Repeat laboratory studies with improvement in the renal function.    Chronic diastolic heart failure - Monitoring while off furosemide.    Acute hypoxic respiratory failure - Improved. On levofloxacin. Inhaled bronchodilator therapy as needed.    Hypertension - Close blood pressure monitoring. On amlodipine and atenolol

## 2019-10-14 LAB
ANION GAP SERPL CALC-SCNC: 14 MMOL/L — SIGNIFICANT CHANGE UP (ref 5–17)
BUN SERPL-MCNC: 35 MG/DL — HIGH (ref 8–20)
CALCIUM SERPL-MCNC: 9 MG/DL — SIGNIFICANT CHANGE UP (ref 8.6–10.2)
CHLORIDE SERPL-SCNC: 106 MMOL/L — SIGNIFICANT CHANGE UP (ref 98–107)
CO2 SERPL-SCNC: 22 MMOL/L — SIGNIFICANT CHANGE UP (ref 22–29)
CREAT SERPL-MCNC: 1.07 MG/DL — SIGNIFICANT CHANGE UP (ref 0.5–1.3)
GLUCOSE SERPL-MCNC: 98 MG/DL — SIGNIFICANT CHANGE UP (ref 70–115)
POTASSIUM SERPL-MCNC: 3.9 MMOL/L — SIGNIFICANT CHANGE UP (ref 3.5–5.3)
POTASSIUM SERPL-SCNC: 3.9 MMOL/L — SIGNIFICANT CHANGE UP (ref 3.5–5.3)
SODIUM SERPL-SCNC: 142 MMOL/L — SIGNIFICANT CHANGE UP (ref 135–145)

## 2019-10-14 PROCEDURE — 99232 SBSQ HOSP IP/OBS MODERATE 35: CPT

## 2019-10-14 RX ORDER — PANTOPRAZOLE SODIUM 20 MG/1
40 TABLET, DELAYED RELEASE ORAL
Refills: 0 | Status: DISCONTINUED | OUTPATIENT
Start: 2019-10-14 | End: 2019-10-15

## 2019-10-14 RX ADMIN — AMLODIPINE BESYLATE 5 MILLIGRAM(S): 2.5 TABLET ORAL at 06:38

## 2019-10-14 RX ADMIN — Medication 81 MILLIGRAM(S): at 11:09

## 2019-10-14 RX ADMIN — ENOXAPARIN SODIUM 40 MILLIGRAM(S): 100 INJECTION SUBCUTANEOUS at 11:09

## 2019-10-14 RX ADMIN — ATENOLOL 50 MILLIGRAM(S): 25 TABLET ORAL at 06:38

## 2019-10-14 RX ADMIN — LEVETIRACETAM 400 MILLIGRAM(S): 250 TABLET, FILM COATED ORAL at 17:29

## 2019-10-14 RX ADMIN — PANTOPRAZOLE SODIUM 40 MILLIGRAM(S): 20 TABLET, DELAYED RELEASE ORAL at 11:09

## 2019-10-14 RX ADMIN — LEVETIRACETAM 400 MILLIGRAM(S): 250 TABLET, FILM COATED ORAL at 06:37

## 2019-10-14 RX ADMIN — Medication 5 MILLIGRAM(S): at 09:23

## 2019-10-14 NOTE — PROGRESS NOTE ADULT - SUBJECTIVE AND OBJECTIVE BOX
JONATHAN GIBSON  ----------------------------------------  The patient was seen and evaluated for   The patient is in no acute distress.  Denied any chest pain, palpitations, dyspnea, or abdominal pain.    Vital Signs Last 24 Hrs  T(C): 36.4 (14 Oct 2019 07:47), Max: 36.8 (13 Oct 2019 14:55)  T(F): 97.6 (14 Oct 2019 07:47), Max: 98.2 (13 Oct 2019 14:55)  HR: 69 (14 Oct 2019 07:47) (68 - 69)  BP: 151/76 (14 Oct 2019 07:47) (125/67 - 151/76)  BP(mean): --  RR: 18 (14 Oct 2019 07:47) (16 - 18)  SpO2: 96% (14 Oct 2019 07:47) (95% - 96%)    PHYSICAL EXAMINATION:  ----------------------------------------  General appearance: No acute distress, Awake, Alert  HEENT: Normocephalic, Atraumatic, Conjunctiva clear, EOMI  Neck: Supple, No JVD, No tenderness  Lungs: Breath sound equal bilaterally, No wheezes, No rales  Cardiovascular: S1S2, Regular rhythm  Abdomen: Soft, Nontender, Nondistended, No guarding/rebound, Positive bowel sounds  Extremities: No clubbing, No cyanosis, No edema, No calf tenderness  Neuro: Strength equal bilaterally, No tremors  Psychiatric: Appropriate mood, Normal affect    LABORATORY STUDIES:  ----------------------------------------             14.8   11.07 )-----------( 224      ( 13 Oct 2019 08:20 )             45.7     10-14    142  |  106  |  35.0<H>  ----------------------------<  98  3.9   |  22.0  |  1.07    Ca    9.0      14 Oct 2019 07:01    MEDICATIONS  (STANDING):  ALBUTerol/ipratropium for Nebulization 3 milliLiter(s) Nebulizer every 6 hours  amLODIPine   Tablet 5 milliGRAM(s) Oral daily  aspirin  chewable 81 milliGRAM(s) Oral daily  ATENolol  Tablet 50 milliGRAM(s) Oral daily  enoxaparin Injectable 40 milliGRAM(s) SubCutaneous daily  levETIRAcetam  IVPB 750 milliGRAM(s) IV Intermittent every 12 hours  levoFLOXacin IVPB 750 milliGRAM(s) IV Intermittent every 24 hours  levoFLOXacin IVPB      pantoprazole    Tablet 40 milliGRAM(s) Oral before breakfast    MEDICATIONS  (PRN):  hydrALAZINE Injectable 5 milliGRAM(s) IV Push every 6 hours PRN For  or more      ASSESSMENT / PLAN:  ----------------------------------------  Ileus - Tolerated a liquid diet. No abdominal pain. To advance to a regular consistency diet.    Acute kidney injury - Repeat laboratory studies with improvement in the renal function.     Chronic diastolic heart failure - Monitoring while off furosemide.    Acute hypoxic respiratory failure - On levofloxacin. Inhaled bronchodilator therapy as needed. No hypoxia on examination.    Hypertension - Close blood pressure monitoring. On amlodipine and atenolol

## 2019-10-15 VITALS
SYSTOLIC BLOOD PRESSURE: 130 MMHG | TEMPERATURE: 97 F | RESPIRATION RATE: 18 BRPM | DIASTOLIC BLOOD PRESSURE: 72 MMHG | OXYGEN SATURATION: 98 % | HEART RATE: 70 BPM

## 2019-10-15 PROCEDURE — 93005 ELECTROCARDIOGRAM TRACING: CPT

## 2019-10-15 PROCEDURE — 36415 COLL VENOUS BLD VENIPUNCTURE: CPT

## 2019-10-15 PROCEDURE — 99239 HOSP IP/OBS DSCHRG MGMT >30: CPT

## 2019-10-15 PROCEDURE — 83880 ASSAY OF NATRIURETIC PEPTIDE: CPT

## 2019-10-15 PROCEDURE — 80053 COMPREHEN METABOLIC PANEL: CPT

## 2019-10-15 PROCEDURE — 74018 RADEX ABDOMEN 1 VIEW: CPT

## 2019-10-15 PROCEDURE — 85610 PROTHROMBIN TIME: CPT

## 2019-10-15 PROCEDURE — 84100 ASSAY OF PHOSPHORUS: CPT

## 2019-10-15 PROCEDURE — 74174 CTA ABD&PLVS W/CONTRAST: CPT

## 2019-10-15 PROCEDURE — 85027 COMPLETE CBC AUTOMATED: CPT

## 2019-10-15 PROCEDURE — 83605 ASSAY OF LACTIC ACID: CPT

## 2019-10-15 PROCEDURE — 71275 CT ANGIOGRAPHY CHEST: CPT

## 2019-10-15 PROCEDURE — 82947 ASSAY GLUCOSE BLOOD QUANT: CPT

## 2019-10-15 PROCEDURE — 86803 HEPATITIS C AB TEST: CPT

## 2019-10-15 PROCEDURE — 71250 CT THORAX DX C-: CPT

## 2019-10-15 PROCEDURE — 84132 ASSAY OF SERUM POTASSIUM: CPT

## 2019-10-15 PROCEDURE — 82330 ASSAY OF CALCIUM: CPT

## 2019-10-15 PROCEDURE — 85730 THROMBOPLASTIN TIME PARTIAL: CPT

## 2019-10-15 PROCEDURE — 84484 ASSAY OF TROPONIN QUANT: CPT

## 2019-10-15 PROCEDURE — 82435 ASSAY OF BLOOD CHLORIDE: CPT

## 2019-10-15 PROCEDURE — 82803 BLOOD GASES ANY COMBINATION: CPT

## 2019-10-15 PROCEDURE — 83735 ASSAY OF MAGNESIUM: CPT

## 2019-10-15 PROCEDURE — 85014 HEMATOCRIT: CPT

## 2019-10-15 PROCEDURE — 94660 CPAP INITIATION&MGMT: CPT

## 2019-10-15 PROCEDURE — 96374 THER/PROPH/DIAG INJ IV PUSH: CPT

## 2019-10-15 PROCEDURE — 71045 X-RAY EXAM CHEST 1 VIEW: CPT

## 2019-10-15 PROCEDURE — C8929: CPT

## 2019-10-15 PROCEDURE — 94760 N-INVAS EAR/PLS OXIMETRY 1: CPT

## 2019-10-15 PROCEDURE — 80048 BASIC METABOLIC PNL TOTAL CA: CPT

## 2019-10-15 PROCEDURE — 99291 CRITICAL CARE FIRST HOUR: CPT | Mod: 25

## 2019-10-15 PROCEDURE — 84295 ASSAY OF SERUM SODIUM: CPT

## 2019-10-15 PROCEDURE — 94640 AIRWAY INHALATION TREATMENT: CPT

## 2019-10-15 RX ORDER — DIPHENOXYLATE HCL/ATROPINE 2.5-.025MG
2 TABLET ORAL
Qty: 0 | Refills: 0 | DISCHARGE

## 2019-10-15 RX ADMIN — AMLODIPINE BESYLATE 5 MILLIGRAM(S): 2.5 TABLET ORAL at 06:14

## 2019-10-15 RX ADMIN — PANTOPRAZOLE SODIUM 40 MILLIGRAM(S): 20 TABLET, DELAYED RELEASE ORAL at 06:14

## 2019-10-15 RX ADMIN — LEVETIRACETAM 400 MILLIGRAM(S): 250 TABLET, FILM COATED ORAL at 06:14

## 2019-10-15 RX ADMIN — ATENOLOL 50 MILLIGRAM(S): 25 TABLET ORAL at 06:14

## 2019-10-15 NOTE — PROGRESS NOTE ADULT - PROVIDER SPECIALTY LIST ADULT
Cardiology
Hospitalist
Hospitalist
Internal Medicine
Pulmonology
Surgery
Hospitalist
Hospitalist

## 2019-10-15 NOTE — PROGRESS NOTE ADULT - SUBJECTIVE AND OBJECTIVE BOX
JONATHAN GIBSON  ----------------------------------------  The patient was seen and evaluated for ileus.  The patient is in no acute distress.  Denied any chest pain, palpitations, dyspnea, or abdominal pain.  Tolerated oral intake. Had bowel movements.    Vital Signs Last 24 Hrs  T(C): 36.4 (15 Oct 2019 08:08), Max: 36.6 (14 Oct 2019 15:16)  T(F): 97.5 (15 Oct 2019 08:08), Max: 97.9 (14 Oct 2019 15:16)  HR: 72 (15 Oct 2019 08:08) (69 - 75)  BP: 137/66 (15 Oct 2019 08:08) (137/66 - 156/67)  BP(mean): --  RR: 18 (15 Oct 2019 08:08) (18 - 18)  SpO2: 96% (15 Oct 2019 08:08) (96% - 98%)    PHYSICAL EXAMINATION:  ----------------------------------------  General appearance: No acute distress, Awake, Alert  HEENT: Normocephalic, Atraumatic, Conjunctiva clear, EOMI  Neck: Supple, No JVD, No tenderness  Lungs: Breath sound equal bilaterally, No wheezes, No rales  Cardiovascular: S1S2, Regular rhythm  Abdomen: Soft, Nontender, Nondistended, No guarding/rebound, Positive bowel sounds  Extremities: No clubbing, No cyanosis, No edema, No calf tenderness  Neuro: Strength equal bilaterally, No tremors  Psychiatric: Appropriate mood, Normal affect    LABORATORY STUDIES:  ----------------------------------------  10-14    142  |  106  |  35.0<H>  ----------------------------<  98  3.9   |  22.0  |  1.07    Ca    9.0      14 Oct 2019 07:01    MEDICATIONS  (STANDING):  amLODIPine   Tablet 5 milliGRAM(s) Oral daily  aspirin  chewable 81 milliGRAM(s) Oral daily  ATENolol  Tablet 50 milliGRAM(s) Oral daily  enoxaparin Injectable 40 milliGRAM(s) SubCutaneous daily  levETIRAcetam  IVPB 750 milliGRAM(s) IV Intermittent every 12 hours  pantoprazole    Tablet 40 milliGRAM(s) Oral before breakfast    MEDICATIONS  (PRN):  hydrALAZINE Injectable 5 milliGRAM(s) IV Push every 6 hours PRN For  or more      ASSESSMENT / PLAN:  ----------------------------------------  Ileus - Tolerated regular consistency diet. Abdominal examination benign.    Acute kidney injury - Repeat laboratory studies with improvement in the renal function.     Chronic diastolic heart failure - No signs of fluid overload.    Acute hypoxic respiratory failure - Completed the course of levofloxacin. Inhaled bronchodilator therapy as needed. No hypoxia on examination.    Hypertension - Close blood pressure monitoring. On amlodipine and atenolol

## 2020-11-02 ENCOUNTER — INPATIENT (INPATIENT)
Facility: HOSPITAL | Age: 60
LOS: 27 days | Discharge: OTHER INSTITUTION | DRG: 216 | End: 2020-11-30
Attending: THORACIC SURGERY (CARDIOTHORACIC VASCULAR SURGERY) | Admitting: HOSPITALIST
Payer: MEDICARE

## 2020-11-02 VITALS — HEIGHT: 65 IN

## 2020-11-02 DIAGNOSIS — R06.00 DYSPNEA, UNSPECIFIED: ICD-10-CM

## 2020-11-02 DIAGNOSIS — I77.9 DISORDER OF ARTERIES AND ARTERIOLES, UNSPECIFIED: Chronic | ICD-10-CM

## 2020-11-02 DIAGNOSIS — Z90.49 ACQUIRED ABSENCE OF OTHER SPECIFIED PARTS OF DIGESTIVE TRACT: Chronic | ICD-10-CM

## 2020-11-02 PROBLEM — I63.9 CEREBRAL INFARCTION, UNSPECIFIED: Chronic | Status: ACTIVE | Noted: 2019-10-08

## 2020-11-02 PROBLEM — I50.9 HEART FAILURE, UNSPECIFIED: Chronic | Status: ACTIVE | Noted: 2019-10-08

## 2020-11-02 LAB
ALBUMIN SERPL ELPH-MCNC: 3.2 G/DL — LOW (ref 3.3–5.2)
ALP SERPL-CCNC: 62 U/L — SIGNIFICANT CHANGE UP (ref 40–120)
ALT FLD-CCNC: 18 U/L — SIGNIFICANT CHANGE UP
ANION GAP SERPL CALC-SCNC: 18 MMOL/L — HIGH (ref 5–17)
APTT BLD: 35.9 SEC — HIGH (ref 27.5–35.5)
AST SERPL-CCNC: 24 U/L — SIGNIFICANT CHANGE UP
BASOPHILS # BLD AUTO: 0.11 K/UL — SIGNIFICANT CHANGE UP (ref 0–0.2)
BASOPHILS NFR BLD AUTO: 0.9 % — SIGNIFICANT CHANGE UP (ref 0–2)
BILIRUB SERPL-MCNC: 0.4 MG/DL — SIGNIFICANT CHANGE UP (ref 0.4–2)
BUN SERPL-MCNC: 43 MG/DL — HIGH (ref 8–20)
CALCIUM SERPL-MCNC: 8.6 MG/DL — SIGNIFICANT CHANGE UP (ref 8.6–10.2)
CHLORIDE SERPL-SCNC: 106 MMOL/L — SIGNIFICANT CHANGE UP (ref 98–107)
CO2 SERPL-SCNC: 17 MMOL/L — LOW (ref 22–29)
CREAT SERPL-MCNC: 2.88 MG/DL — HIGH (ref 0.5–1.3)
D DIMER BLD IA.RAPID-MCNC: 611 NG/ML DDU — HIGH
EOSINOPHIL # BLD AUTO: 0.68 K/UL — HIGH (ref 0–0.5)
EOSINOPHIL NFR BLD AUTO: 5.3 % — SIGNIFICANT CHANGE UP (ref 0–6)
GLUCOSE SERPL-MCNC: 125 MG/DL — HIGH (ref 70–99)
HCT VFR BLD CALC: 29.5 % — LOW (ref 39–50)
HGB BLD-MCNC: 9.4 G/DL — LOW (ref 13–17)
IMM GRANULOCYTES NFR BLD AUTO: 0.3 % — SIGNIFICANT CHANGE UP (ref 0–1.5)
INR BLD: 1.16 RATIO — SIGNIFICANT CHANGE UP (ref 0.88–1.16)
LYMPHOCYTES # BLD AUTO: 2.67 K/UL — SIGNIFICANT CHANGE UP (ref 1–3.3)
LYMPHOCYTES # BLD AUTO: 20.7 % — SIGNIFICANT CHANGE UP (ref 13–44)
MCHC RBC-ENTMCNC: 29.2 PG — SIGNIFICANT CHANGE UP (ref 27–34)
MCHC RBC-ENTMCNC: 31.9 GM/DL — LOW (ref 32–36)
MCV RBC AUTO: 91.6 FL — SIGNIFICANT CHANGE UP (ref 80–100)
MONOCYTES # BLD AUTO: 0.93 K/UL — HIGH (ref 0–0.9)
MONOCYTES NFR BLD AUTO: 7.2 % — SIGNIFICANT CHANGE UP (ref 2–14)
NEUTROPHILS # BLD AUTO: 8.45 K/UL — HIGH (ref 1.8–7.4)
NEUTROPHILS NFR BLD AUTO: 65.6 % — SIGNIFICANT CHANGE UP (ref 43–77)
NT-PROBNP SERPL-SCNC: HIGH PG/ML (ref 0–300)
PLATELET # BLD AUTO: 286 K/UL — SIGNIFICANT CHANGE UP (ref 150–400)
POTASSIUM SERPL-MCNC: 3.1 MMOL/L — LOW (ref 3.5–5.3)
POTASSIUM SERPL-SCNC: 3.1 MMOL/L — LOW (ref 3.5–5.3)
PROT SERPL-MCNC: 6.8 G/DL — SIGNIFICANT CHANGE UP (ref 6.6–8.7)
PROTHROM AB SERPL-ACNC: 13.4 SEC — SIGNIFICANT CHANGE UP (ref 10.6–13.6)
RBC # BLD: 3.22 M/UL — LOW (ref 4.2–5.8)
RBC # FLD: 14.8 % — HIGH (ref 10.3–14.5)
SARS-COV-2 RNA SPEC QL NAA+PROBE: SIGNIFICANT CHANGE UP
SODIUM SERPL-SCNC: 141 MMOL/L — SIGNIFICANT CHANGE UP (ref 135–145)
TROPONIN T SERPL-MCNC: 0.13 NG/ML — HIGH (ref 0–0.06)
WBC # BLD: 12.88 K/UL — HIGH (ref 3.8–10.5)
WBC # FLD AUTO: 12.88 K/UL — HIGH (ref 3.8–10.5)

## 2020-11-02 PROCEDURE — 93010 ELECTROCARDIOGRAM REPORT: CPT

## 2020-11-02 PROCEDURE — 71045 X-RAY EXAM CHEST 1 VIEW: CPT | Mod: 26

## 2020-11-02 PROCEDURE — 99223 1ST HOSP IP/OBS HIGH 75: CPT

## 2020-11-02 PROCEDURE — 99291 CRITICAL CARE FIRST HOUR: CPT | Mod: CS,GC

## 2020-11-02 RX ORDER — POTASSIUM CHLORIDE 20 MEQ
40 PACKET (EA) ORAL EVERY 4 HOURS
Refills: 0 | Status: COMPLETED | OUTPATIENT
Start: 2020-11-02 | End: 2020-11-02

## 2020-11-02 RX ORDER — SODIUM CHLORIDE 9 MG/ML
3 INJECTION INTRAMUSCULAR; INTRAVENOUS; SUBCUTANEOUS ONCE
Refills: 0 | Status: COMPLETED | OUTPATIENT
Start: 2020-11-02 | End: 2020-11-02

## 2020-11-02 RX ORDER — SENNA PLUS 8.6 MG/1
2 TABLET ORAL AT BEDTIME
Refills: 0 | Status: DISCONTINUED | OUTPATIENT
Start: 2020-11-02 | End: 2020-11-13

## 2020-11-02 RX ORDER — FUROSEMIDE 40 MG
40 TABLET ORAL EVERY 12 HOURS
Refills: 0 | Status: DISCONTINUED | OUTPATIENT
Start: 2020-11-02 | End: 2020-11-02

## 2020-11-02 RX ORDER — LEVETIRACETAM 250 MG/1
750 TABLET, FILM COATED ORAL
Refills: 0 | Status: DISCONTINUED | OUTPATIENT
Start: 2020-11-02 | End: 2020-11-13

## 2020-11-02 RX ORDER — HYDRALAZINE HCL 50 MG
10 TABLET ORAL EVERY 6 HOURS
Refills: 0 | Status: DISCONTINUED | OUTPATIENT
Start: 2020-11-02 | End: 2020-11-09

## 2020-11-02 RX ORDER — ENOXAPARIN SODIUM 100 MG/ML
30 INJECTION SUBCUTANEOUS DAILY
Refills: 0 | Status: DISCONTINUED | OUTPATIENT
Start: 2020-11-02 | End: 2020-11-08

## 2020-11-02 RX ORDER — ASPIRIN/CALCIUM CARB/MAGNESIUM 324 MG
324 TABLET ORAL ONCE
Refills: 0 | Status: COMPLETED | OUTPATIENT
Start: 2020-11-02 | End: 2020-11-02

## 2020-11-02 RX ORDER — ENOXAPARIN SODIUM 100 MG/ML
40 INJECTION SUBCUTANEOUS DAILY
Refills: 0 | Status: DISCONTINUED | OUTPATIENT
Start: 2020-11-02 | End: 2020-11-02

## 2020-11-02 RX ORDER — POTASSIUM CHLORIDE 20 MEQ
40 PACKET (EA) ORAL ONCE
Refills: 0 | Status: DISCONTINUED | OUTPATIENT
Start: 2020-11-02 | End: 2020-11-02

## 2020-11-02 RX ORDER — FOLIC ACID 0.8 MG
1 TABLET ORAL DAILY
Refills: 0 | Status: DISCONTINUED | OUTPATIENT
Start: 2020-11-02 | End: 2020-11-13

## 2020-11-02 RX ORDER — ATORVASTATIN CALCIUM 80 MG/1
40 TABLET, FILM COATED ORAL AT BEDTIME
Refills: 0 | Status: DISCONTINUED | OUTPATIENT
Start: 2020-11-02 | End: 2020-11-13

## 2020-11-02 RX ORDER — CARVEDILOL PHOSPHATE 80 MG/1
12.5 CAPSULE, EXTENDED RELEASE ORAL EVERY 12 HOURS
Refills: 0 | Status: DISCONTINUED | OUTPATIENT
Start: 2020-11-02 | End: 2020-11-03

## 2020-11-02 RX ORDER — LABETALOL HCL 100 MG
10 TABLET ORAL ONCE
Refills: 0 | Status: COMPLETED | OUTPATIENT
Start: 2020-11-02 | End: 2020-11-02

## 2020-11-02 RX ORDER — HYDRALAZINE HCL 50 MG
50 TABLET ORAL THREE TIMES A DAY
Refills: 0 | Status: DISCONTINUED | OUTPATIENT
Start: 2020-11-02 | End: 2020-11-03

## 2020-11-02 RX ORDER — IPRATROPIUM/ALBUTEROL SULFATE 18-103MCG
3 AEROSOL WITH ADAPTER (GRAM) INHALATION EVERY 6 HOURS
Refills: 0 | Status: DISCONTINUED | OUTPATIENT
Start: 2020-11-02 | End: 2020-11-13

## 2020-11-02 RX ORDER — ASPIRIN/CALCIUM CARB/MAGNESIUM 324 MG
81 TABLET ORAL DAILY
Refills: 0 | Status: DISCONTINUED | OUTPATIENT
Start: 2020-11-02 | End: 2020-11-13

## 2020-11-02 RX ORDER — NITROGLYCERIN 6.5 MG
0.4 CAPSULE, EXTENDED RELEASE ORAL
Refills: 0 | Status: DISCONTINUED | OUTPATIENT
Start: 2020-11-02 | End: 2020-11-13

## 2020-11-02 RX ORDER — FUROSEMIDE 40 MG
40 TABLET ORAL ONCE
Refills: 0 | Status: COMPLETED | OUTPATIENT
Start: 2020-11-02 | End: 2020-11-02

## 2020-11-02 RX ADMIN — SENNA PLUS 2 TABLET(S): 8.6 TABLET ORAL at 23:31

## 2020-11-02 RX ADMIN — Medication 40 MILLIEQUIVALENT(S): at 13:24

## 2020-11-02 RX ADMIN — Medication 40 MILLIGRAM(S): at 11:39

## 2020-11-02 RX ADMIN — SODIUM CHLORIDE 3 MILLILITER(S): 9 INJECTION INTRAMUSCULAR; INTRAVENOUS; SUBCUTANEOUS at 09:25

## 2020-11-02 RX ADMIN — ATORVASTATIN CALCIUM 40 MILLIGRAM(S): 80 TABLET, FILM COATED ORAL at 23:31

## 2020-11-02 RX ADMIN — CARVEDILOL PHOSPHATE 12.5 MILLIGRAM(S): 80 CAPSULE, EXTENDED RELEASE ORAL at 13:24

## 2020-11-02 RX ADMIN — ENOXAPARIN SODIUM 30 MILLIGRAM(S): 100 INJECTION SUBCUTANEOUS at 17:35

## 2020-11-02 RX ADMIN — Medication 0.4 MILLIGRAM(S): at 09:00

## 2020-11-02 RX ADMIN — Medication 10 MILLIGRAM(S): at 09:25

## 2020-11-02 RX ADMIN — Medication 324 MILLIGRAM(S): at 09:25

## 2020-11-02 RX ADMIN — Medication 50 MILLIGRAM(S): at 17:35

## 2020-11-02 RX ADMIN — Medication 40 MILLIEQUIVALENT(S): at 17:36

## 2020-11-02 RX ADMIN — Medication 50 MILLIGRAM(S): at 23:31

## 2020-11-02 RX ADMIN — LEVETIRACETAM 750 MILLIGRAM(S): 250 TABLET, FILM COATED ORAL at 17:36

## 2020-11-02 RX ADMIN — CARVEDILOL PHOSPHATE 12.5 MILLIGRAM(S): 80 CAPSULE, EXTENDED RELEASE ORAL at 23:31

## 2020-11-02 RX ADMIN — Medication 1 MILLIGRAM(S): at 17:36

## 2020-11-02 NOTE — ED PROVIDER NOTE - CONSTITUTIONAL, MLM
normal... ill appearing, awake, alert, oriented to person, place, time/situation and in moderate apparent distress. diaphoretic

## 2020-11-02 NOTE — ED PROVIDER NOTE - SKIN, MLM
Skin normal color for race, warm, dry and intact. No evidence of rash. midline scar chest wall diaphoretic

## 2020-11-02 NOTE — ED PROVIDER NOTE - PROGRESS NOTE DETAILS
Admitted to medicine under Dr. Gardner. med list: aspirin 81 mg qd, diphenoxylate atropine tabs  Levetiracetam 750 mg bid, duloxetine hcl 30 mg qd, lisinopril 5 mg, atenolol 50 mg qd, amlodipine 5 mg qd, pravastatin 20 mg qd omeprazle 20 mg qd, folic acid 1 mg qd, vit D 50,000 units a week,

## 2020-11-02 NOTE — ED PROVIDER NOTE - CARE PLAN
Principal Discharge DX:	Acute dyspnea  Secondary Diagnosis:	Acute congestive heart failure, unspecified heart failure type  Secondary Diagnosis:	SANTOS (acute kidney injury)

## 2020-11-02 NOTE — CONSULT NOTE ADULT - SUBJECTIVE AND OBJECTIVE BOX
[facility], [facility]                                          [address], [address], [place] [address]                                               PHONE: [phone]    FAX: [phone] [address], [address], [place] [address]                                       PHONE: [phone]    FAX: [phone]  *******************************************************************************    Reason for Consult: Chest pain    HPI:  JONATHAN GIBSON is a 60y man with HTN, HLD, CAD s/p remote stent (most recent nuclear stress test 11/2019 showing infarct without ischemia), ascending aortic dissection s/p repair (2001), chronic diastolic HFpEF, moderate valvular heart disease including moderate aortic and mitral regurgitation, who presents to the ER for evaluation of shortness of breath.  The patient states that he felt as though he couldn't breath and had to come to ER.  He reports associated chest tightness.  No diaphoresis, palpitations, syncope or near syncope.  He states that over the last few weeks at least, he's been experiencing progressive exertional shortness of breath and fatigue.  While he denies any bright red blood per rectum he admits to "dark stool" and "major change in urine color which could have been blood."  He also admits to significant weight loss but not sure how much.  Currently states that he "is feeling better" and denies any chest pain, palpitations, shortness of breath, syncope or near syncope.     PAST MEDICAL & SURGICAL HISTORY:  CVA (cerebral vascular accident)    CHF (congestive heart failure)    H/O colectomy    Aorta disorder    penicillin (Unknown)      MEDICATIONS  (STANDING):  aspirin enteric coated 81 milliGRAM(s) Oral daily  atorvastatin 40 milliGRAM(s) Oral at bedtime  carvedilol 12.5 milliGRAM(s) Oral every 12 hours  enoxaparin Injectable 30 milliGRAM(s) SubCutaneous daily  folic acid 1 milliGRAM(s) Oral daily  furosemide   Injectable 40 milliGRAM(s) IV Push every 12 hours  hydrALAZINE 50 milliGRAM(s) Oral three times a day  levETIRAcetam 750 milliGRAM(s) Oral two times a day  senna 2 Tablet(s) Oral at bedtime    MEDICATIONS  (PRN):  albuterol/ipratropium for Nebulization 3 milliLiter(s) Nebulizer every 6 hours PRN Bronchospasm  hydrALAZINE Injectable 10 milliGRAM(s) IV Push every 6 hours PRN for sbp above 160 mmhg  nitroglycerin     SubLingual 0.4 milliGRAM(s) SubLingual every 5 minutes PRN Chest Pain      Social History: no active tobacco / EtOH / IVDA    Family History: FH: hypertension    ROS: As noted above, otherwise unremarkable.    Vital Signs Last 24 Hrs  T(C): 36.6 (02 Nov 2020 15:56), Max: 36.9 (02 Nov 2020 09:17)  T(F): 97.9 (02 Nov 2020 15:56), Max: 98.5 (02 Nov 2020 09:17)  HR: 92 (02 Nov 2020 15:56) (92 - 122)  BP: 150/75 (02 Nov 2020 15:56) (150/75 - 242/107)  RR: 15 (02 Nov 2020 15:56) (15 - 30)  SpO2: 94% (02 Nov 2020 15:56) (94% - 98%)      PHYSICAL EXAM:  General: Appears well developed, well nourished, no acute distress  HEENT: Head: normocephalic, atraumatic  Eyes: Pupils equal and reactive  Neck: Supple, no carotid bruit, no JVD, no HJR  CARDIOVASCULAR: Normal S1 and S2, 3/6 systolic murmur  LUNGS: Clear to auscultation bilaterally, no rales, rhonchi or wheeze  ABDOMEN: Soft, nontender, non-distended, positive bowel sounds, no mass or bruit  EXTREMITIES: trace-1+ b/l LE pitting edema  SKIN: Warm and dry with normal turgor  NEURO: Alert & oriented x 3, grossly intact  PSYCH: normal mood and affect    LABS:                        9.4    12.88 )-----------( 286      ( 02 Nov 2020 09:21 )             29.5     11-02    141  |  106  |  43.0<H>  ----------------------------<  125<H>  3.1<L>   |  17.0<L>  |  2.88<H>    Ca    8.6      02 Nov 2020 09:21    TPro  6.8  /  Alb  3.2<L>  /  TBili  0.4  /  DBili  x   /  AST  24  /  ALT  18  /  AlkPhos  62  11-02    CARDIAC MARKERS ( 02 Nov 2020 09:21 )  x     / 0.13 ng/mL / x     / x     / x          PT/INR - ( 02 Nov 2020 09:21 )   PT: 13.4 sec;   INR: 1.16 ratio         PTT - ( 02 Nov 2020 09:21 )  PTT:35.9 sec      RADIOLOGY & ADDITIONAL STUDIES REVIEWED    Assessment and Plan:  In summary, JONATHAN GIBSON is a 60y man with HTN, HLD, CAD s/p remote stent (most recent nuclear stress test 11/2019 showing infarct without ischemia), ascending aortic dissection s/p repair (2001), chronic diastolic HFpEF, moderate valvular heart disease including moderate aortic and mitral regurgitation, admitted for evaluation of shortness of breath, found to have new anemia and SANTOS in the setting of markedly elevated BP, incidentally found to have mildly elevated troponin.      - Telemetry reviewed by me: all sinus.   - The patient has been chest pain free since admission.  Mildly elevated initial troponin is nonspecific, especially in the setting of SANTOS.  Nothing to suggest acute MI.  Would repeat ECG should symptoms recur.   - Echocardiogram pending.   - Mild HF symptoms on admission resolved following IV lasix.  Would hold additional diuresis for now in setting of significant SANTOS and dose as needed.    - Rhythm/hemodynamics stable: BP markedly elevated on admission now improved following restarting of PO regimen which will be continued at this time.   - Acute anemia: one suspects patient's symptoms of progressive shortness of breath are related to anemia.  Discussed with Dr. Gardner who will arranging for GI evaluation particularly given reports of dark stool. Also with possible hematuria.  Check UA and consider urology.   - HLD: continue statin at current dose.   - Labs reviewed.   - Imaging reviewed.     We will follow with you.  Thank you for allowing me to participate in the care of your patient.      Sincerely,    Pepe Souza M.D.

## 2020-11-02 NOTE — H&P ADULT - HISTORY OF PRESENT ILLNESS
Patient is a 60 yoM with PMH of CAD s/p stent in LAD, leaky aortic valve, uncontrolled bp, thoracic aortic dissection requiring emergent surgery, CVA with left sided sensory loss, colostomy for bowel ischemia s/p reversal, psoriasis on Humira injections presents from home with sob and le edema. Patient states the past several weeks, he has not been taking his medications because he forgot. Patient states the sob has gotten worse over the past few days and also associated with chest pressure. Patient also hasnt seen his cardiologist in several months. Patient worked up in the er and had an elevated bnp and htn urgency and eric. Patient seen at bedside and states feeling a little better.

## 2020-11-02 NOTE — ED ADULT TRIAGE NOTE - CHIEF COMPLAINT QUOTE
Pt presents to ED c/o shortness of breath and chest pain.  Difficulty speaking in full sentences.  Transported to critical care.

## 2020-11-02 NOTE — H&P ADULT - NSHPREVIEWOFSYSTEMS_GEN_ALL_CORE
REVIEW OF SYSTEMS:    CONSTITUTIONAL: fatigue  EYES: No eye pain, visual disturbances, or discharge  ENMT:  No difficulty hearing, tinnitus, vertigo; No sinus or throat pain  NECK: No pain or stiffness  BREASTS: No pain, masses, or nipple discharge  RESPIRATORY: sob,   CARDIOVASCULAR: leg swelling   GASTROINTESTINAL: No abdominal or epigastric pain. No nausea, vomiting, or hematemesis; No diarrhea or constipation. No melena or hematochezia.  GENITOURINARY: No dysuria, frequency, hematuria, or incontinence  NEUROLOGICAL: No headaches, memory loss, loss of strength, numbness, or tremors  SKIN: No itching, burning, rashes, or lesions   LYMPH NODES: No enlarged glands  ENDOCRINE: No heat or cold intolerance; No hair loss  MUSCULOSKELETAL: No joint pain or swelling; No muscle, back, or extremity pain  PSYCHIATRIC: No depression, anxiety, mood swings, or difficulty sleeping  HEME/LYMPH: No easy bruising, or bleeding gums  ALLERY AND IMMUNOLOGIC: No hives or eczema

## 2020-11-02 NOTE — ED ADULT NURSE NOTE - OBJECTIVE STATEMENT
Assumed care at 0858 pt co difficulty breathing, denies nay fevers, chills, chest pain. MD Antoine at bedside. pt states symptoms have been around for one month but today was worse. pt has hx of MI, CVA.

## 2020-11-02 NOTE — ED PROVIDER NOTE - OBJECTIVE STATEMENT
59 yo male with c/c of SOB  Pt states over the past severeal weeks he "hasn't felt well". He has been having increasingly severe ZUNIGA, even with showering. He also admits to feeling "indigestion" which is what he felt when he had an MI in the past. Also c/o swelling in his feet and ankles. No other symptoms  May have missed " a pill here or there" over the past few weeks.   Med hx Aortic aneurysm with repair, htn, x smoker , x cocaine for ten years  Surg hx bowel resection s/p wound dehiscence, aneurysm repair

## 2020-11-02 NOTE — ED PROVIDER NOTE - CRITICAL CARE PROVIDED
direct patient care (not related to procedure)/interpretation of diagnostic studies/consultation with other physicians/45 minutes/additional history taking/documentation

## 2020-11-02 NOTE — H&P ADULT - ASSESSMENT
#acutely decompensated heart failure - admit to tele  --> [facility] called by er  --> tte  --> iv lasix  -> coreg , asa , hold ace-I given eric     #eric - renal us  --> refusing painting  --> hold ace-I    #cva history - asa and statin     #hypokalemia - replete    #nstemi - likey from eric and demand ischemia  --> monitor on tele     #htn urgency - coreg  --> add hydraalzine  --> hold norvasc    #dvt prop - lovenox sub q     #acutely decompensated heart failure - admit to tele  --> [facility] called by er  --> tte  -> coreg , asa , hold ace-I given eric   - spoke to cardio hold lasix for now reassess in am     #eric - renal us  --> refusing painting  --> hold ace-I  -> will consider renal consult in am if doesnt improve     #cva history - asa and statin     #hypokalemia - replete    #nstemi - likey from eric and demand ischemia  --> monitor on tele     #htn urgency - coreg  --> add hydraalzine  --> hold norvasc    #anemia - send iron studies  --> Will consult GI for am     #dvt prop - lovenox sub q

## 2020-11-02 NOTE — ED PROVIDER NOTE - CARDIAC, MLM
Normal rate, regular rhythm.  Heart sounds S1, S2.  No murmurs, rubs or gallops. + edema bilateral ankles

## 2020-11-02 NOTE — ED PROVIDER NOTE - CLINICAL SUMMARY MEDICAL DECISION MAKING FREE TEXT BOX
Pt with cardiac hx presents with lara and atypical CP pe as documented  ekg not ischemic xray pulm ed labs eric trop elevated bnop markedly elevated  plan [facility] called and admit med  not dimer elevated but with eric unable to order CTA

## 2020-11-02 NOTE — H&P ADULT - NSHPLABSRESULTS_GEN_ALL_CORE
9.4    12.88  )----------(  286       ( 02 Nov 2020 09:21 )               29.5      141    |  106    |  43.0   ----------------------------<  125        ( 02 Nov 2020 09:21 )  3.1     |  17.0   |  2.88     Ca    8.6        ( 02 Nov 2020 09:21 )    TPro  6.8    /  Alb  3.2    /  TBili  0.4    /  DBili  x      /  AST  24     /  ALT  18     /  AlkPhos  62     ( 02 Nov 2020 09:21 )    LIVER FUNCTIONS - ( 02 Nov 2020 09:21 )  Alb: 3.2 g/dL / Pro: 6.8 g/dL / ALK PHOS: 62 U/L / ALT: 18 U/L / AST: 24 U/L / GGT: x           PT/INR -  13.4 sec / 1.16 ratio   ( 02 Nov 2020 09:21 )       PTT -  35.9 sec   ( 02 Nov 2020 09:21 )  CAPILLARY BLOOD GLUCOSE    CARDIAC MARKERS ( 02 Nov 2020 09:21 )  x     / 0.13 ng/mL / x     / x     / x

## 2020-11-03 LAB
A1C WITH ESTIMATED AVERAGE GLUCOSE RESULT: 5.1 % — SIGNIFICANT CHANGE UP (ref 4–5.6)
BLD GP AB SCN SERPL QL: SIGNIFICANT CHANGE UP
CHOLEST SERPL-MCNC: 154 MG/DL — SIGNIFICANT CHANGE UP
ESTIMATED AVERAGE GLUCOSE: 100 MG/DL — SIGNIFICANT CHANGE UP (ref 68–114)
FERRITIN SERPL-MCNC: 230 NG/ML — SIGNIFICANT CHANGE UP (ref 30–400)
HDLC SERPL-MCNC: 44 MG/DL — SIGNIFICANT CHANGE UP
IRON SATN MFR SERPL: 29 UG/DL — LOW (ref 59–158)
IRON SATN MFR SERPL: 9 % — LOW (ref 16–55)
LIPID PNL WITH DIRECT LDL SERPL: 89 MG/DL — SIGNIFICANT CHANGE UP
NON HDL CHOLESTEROL: 110 MG/DL — SIGNIFICANT CHANGE UP
SARS-COV-2 IGG SERPL QL IA: POSITIVE
SARS-COV-2 IGM SERPL IA-ACNC: 34.9 INDEX — HIGH
TIBC SERPL-MCNC: 332 UG/DL — SIGNIFICANT CHANGE UP (ref 220–430)
TRANSFERRIN SERPL-MCNC: 232 MG/DL — SIGNIFICANT CHANGE UP (ref 180–329)
TRIGL SERPL-MCNC: 104 MG/DL — SIGNIFICANT CHANGE UP
TSH SERPL-MCNC: 1.95 UIU/ML — SIGNIFICANT CHANGE UP (ref 0.27–4.2)

## 2020-11-03 PROCEDURE — 99233 SBSQ HOSP IP/OBS HIGH 50: CPT

## 2020-11-03 PROCEDURE — 99223 1ST HOSP IP/OBS HIGH 75: CPT

## 2020-11-03 RX ORDER — CARVEDILOL PHOSPHATE 80 MG/1
25 CAPSULE, EXTENDED RELEASE ORAL EVERY 12 HOURS
Refills: 0 | Status: DISCONTINUED | OUTPATIENT
Start: 2020-11-03 | End: 2020-11-13

## 2020-11-03 RX ORDER — PANTOPRAZOLE SODIUM 20 MG/1
40 TABLET, DELAYED RELEASE ORAL
Refills: 0 | Status: DISCONTINUED | OUTPATIENT
Start: 2020-11-03 | End: 2020-11-13

## 2020-11-03 RX ORDER — HYDRALAZINE HCL 50 MG
75 TABLET ORAL THREE TIMES A DAY
Refills: 0 | Status: DISCONTINUED | OUTPATIENT
Start: 2020-11-03 | End: 2020-11-13

## 2020-11-03 RX ADMIN — LEVETIRACETAM 750 MILLIGRAM(S): 250 TABLET, FILM COATED ORAL at 16:57

## 2020-11-03 RX ADMIN — SENNA PLUS 2 TABLET(S): 8.6 TABLET ORAL at 22:22

## 2020-11-03 RX ADMIN — LEVETIRACETAM 750 MILLIGRAM(S): 250 TABLET, FILM COATED ORAL at 05:38

## 2020-11-03 RX ADMIN — Medication 50 MILLIGRAM(S): at 05:38

## 2020-11-03 RX ADMIN — Medication 1 MILLIGRAM(S): at 16:54

## 2020-11-03 RX ADMIN — Medication 75 MILLIGRAM(S): at 17:00

## 2020-11-03 RX ADMIN — ENOXAPARIN SODIUM 30 MILLIGRAM(S): 100 INJECTION SUBCUTANEOUS at 16:48

## 2020-11-03 RX ADMIN — Medication 10 MILLIGRAM(S): at 05:43

## 2020-11-03 RX ADMIN — ATORVASTATIN CALCIUM 40 MILLIGRAM(S): 80 TABLET, FILM COATED ORAL at 22:21

## 2020-11-03 RX ADMIN — Medication 75 MILLIGRAM(S): at 22:22

## 2020-11-03 RX ADMIN — Medication 81 MILLIGRAM(S): at 16:48

## 2020-11-03 RX ADMIN — CARVEDILOL PHOSPHATE 25 MILLIGRAM(S): 80 CAPSULE, EXTENDED RELEASE ORAL at 16:56

## 2020-11-03 RX ADMIN — CARVEDILOL PHOSPHATE 12.5 MILLIGRAM(S): 80 CAPSULE, EXTENDED RELEASE ORAL at 05:38

## 2020-11-03 NOTE — PROGRESS NOTE ADULT - SUBJECTIVE AND OBJECTIVE BOX
Chief complaint: Shortness of breath    Patient seen and examined at bedside. No acute overnight events reported. Patient states he is doing well and has no complaints currently. Denies headache, fever, chills, cough, chest pain, shortness of breath, nausea, vomiting, abdominal pain, diarrhea or constipation.     Vital Signs Last 24 Hrs  T(F): 97.6 (03 Nov 2020 07:56), Max: 98.5 (02 Nov 2020 11:18)  HR: 69 (03 Nov 2020 07:56) (69 - 97)  BP: 129/68 (03 Nov 2020 07:56) (129/68 - 181/82)  RR: 18 (03 Nov 2020 07:56) (15 - 98)  SpO2: 99% (03 Nov 2020 07:56) (94% - 99%)    Physical Exam:  Constitutional: alert and oriented, in no acute distress   Neck: Soft and supple, No LAD, No JVD  Respiratory: Good air entry bilaterally, no crackles  Cardiovascular: Regular rate and rhythm no murmurs, gallops, rubs  Gastrointestinal: Soft, non-tender to palpation, +bs  Vascular: 2+ peripheral pulses  Neurological: A/O x 3, no focal neurological deficits  Musculoskeletal: no lower extremity edema bilaterally    Labs:                      9.4    12.88 )-----------( 286      ( 02 Nov 2020 09:21 )             29.5   11-02    141  |  106  |  43.0<H>  ----------------------------<  125<H>  3.1<L>   |  17.0<L>  |  2.88<H>    Ca    8.6      02 Nov 2020 09:21    TPro  6.8  /  Alb  3.2<L>  /  TBili  0.4  /  DBili  x   /  AST  24  /  ALT  18  /  AlkPhos  62  11-02

## 2020-11-03 NOTE — PROGRESS NOTE ADULT - SUBJECTIVE AND OBJECTIVE BOX
[facility], [facility]                                                    [address] [facility], [address], [place] [address]                                                         PHONE: [phone]    FAX: [phone] [address] [facility], [address], [place] [address]                                                 PHONE: [phone]    FAX: [phone]  *******************************************************************************  cc: chest pain    HPI: 60y man with HTN, HLD, CAD s/p remote stent (most recent nuclear stress test 11/2019 showing infarct without ischemia), ascending aortic dissection s/p repair (2001), chronic diastolic HFpEF, moderate valvular heart disease including moderate aortic and mitral regurgitation, who presents to the ER for evaluation of shortness of breath.  The patient states that he felt as though he couldn't breath and had to come to ER.  He reports associated chest tightness.  No diaphoresis, palpitations, syncope or near syncope.  He states that over the last few weeks at least, he's been experiencing progressive exertional shortness of breath and fatigue.  While he denies any bright red blood per rectum he admits to "dark stool" and "major change in urine color which could have been blood."  He also admits to significant weight loss but not sure how much.  Currently states that he "is feeling better" and denies any chest pain, palpitations, shortness of breath, syncope or near syncope.         Overnight events/Subjective Assessment: no new complaints. Able to lay flat without PND or orthopnea. No CP or SOB reported. No dizziness or syncope    INTERPRETATION OF TELEMETRY (personally reviewed): SR    PAST MEDICAL & SURGICAL HISTORY:  CVA (cerebral vascular accident)    CHF (congestive heart failure)    H/O colectomy    Aorta disorder        penicillin (Unknown)      MEDICATIONS  (STANDING):  aspirin enteric coated 81 milliGRAM(s) Oral daily  atorvastatin 40 milliGRAM(s) Oral at bedtime  carvedilol 12.5 milliGRAM(s) Oral every 12 hours  enoxaparin Injectable 30 milliGRAM(s) SubCutaneous daily  folic acid 1 milliGRAM(s) Oral daily  hydrALAZINE 50 milliGRAM(s) Oral three times a day  levETIRAcetam 750 milliGRAM(s) Oral two times a day  senna 2 Tablet(s) Oral at bedtime    MEDICATIONS  (PRN):  albuterol/ipratropium for Nebulization 3 milliLiter(s) Nebulizer every 6 hours PRN Bronchospasm  hydrALAZINE Injectable 10 milliGRAM(s) IV Push every 6 hours PRN for sbp above 160 mmhg  nitroglycerin     SubLingual 0.4 milliGRAM(s) SubLingual every 5 minutes PRN Chest Pain      Vital Signs Last 24 Hrs  T(C): 36.6 (03 Nov 2020 05:32), Max: 36.9 (02 Nov 2020 09:17)  T(F): 97.8 (03 Nov 2020 05:32), Max: 98.5 (02 Nov 2020 09:17)  HR: 97 (03 Nov 2020 05:32) (92 - 122)  BP: 181/82 (03 Nov 2020 05:32) (150/75 - 242/107)  BP(mean): --  RR: 98 (03 Nov 2020 05:32) (15 - 98)  SpO2: 98% (03 Nov 2020 01:37) (94% - 98%)    I&O's Detail    I&O's Summary          PHYSICAL EXAM:  General: Appears well developed, well nourished, no acute distress. not in acute pain  HEAD: normal cephalic. Atraumatic  PUPILS: equal and reactive to light  EARS: normal hearing  NECK: supple. no JVD or HJR. no carotid bruits. no visible lymphadenopathy  NOSE: no gross abnormalities  CHEST: symmetric chest wall expansion  CARDIOVASCULAR: Normal rate. Regular rhythm. Normal S1 and S2, no S3/S4,  no murmur, rub, or gallop  LUNGS: Normal effort. Normal respiratory rate. Breath sounds are clear to auscultation bilaterally. No respiratory distress. No stridor.  no rales, rhonchi or wheeze. no decreased Breath sounds  ABDOMEN: Soft, nontender, non-distended, positive bowel sounds, no mass or bruit. no abdominal tenderness. No rebound. no ascites  EXTREMITIES: No clubbing, cyanosis or edema. normal range of motion  PULSES:  distal pulses WNL  SKIN: Warm and dry with normal turgor. no visible rash or cyanosis   NEURO: Alert & oriented x 3, grossly intact with no focal weakness  PSYCH: normal mood and affect. Grossly normal insight and judgement exhibited    FAMILY HISTORY:  FH: hypertension  No family hx reported of ischemic heart disease in parents or 1st degree relatives      SOCIAL HISTORY:  no active smoking. No ETOH/No IVDA    REVIEW OF SYSTEMS:  Constitutional: no fever, chills or malaise. No weight loss  Head: no trauma  Eyes: no visual deficit. No double vision  Ears: no hearing deficit or ringing in the ears  Nose: no nose bleeds or smell changes or congestion  Throat: no difficult swallowing or painful swallowing  Neck: supple. No lymphadenopathy or swelling  Respiratory: no SOB, wheeze, asthma, COPD. No cough. No blood in the sputum  Cardiovascular: no CP, palpitations, irregular heart beats. No edema. No PND. No orthopnea. No skin/temperature or color changes  Gastrointestinal: no abdominal pain. No constipation. No diarrhea. No melena. No nausea. No vomiting. No bloating  Genitourinary: no frequency or urgency. No hematuria  Lymphatics: no grossly swollen lymph nodes  Musculoskeletal: no limitation of range of motion. Normal strength. No pain  Integumentary: no visible rash. No itching  Neurologic: no HA. No TIA or stroke symptoms. No seizure. No hx of epilepsy. No tingling or numbness. No weakness. No dizziness  Psychiatric: denied. Reports appropriate mood.        LABS:                        9.4    12.88 )-----------( 286      ( 02 Nov 2020 09:21 )             29.5     11-02    141  |  106  |  43.0<H>  ----------------------------<  125<H>  3.1<L>   |  17.0<L>  |  2.88<H>    Ca    8.6      02 Nov 2020 09:21    TPro  6.8  /  Alb  3.2<L>  /  TBili  0.4  /  DBili  x   /  AST  24  /  ALT  18  /  AlkPhos  62  11-02    CARDIAC MARKERS ( 02 Nov 2020 09:21 )  x     / 0.13 ng/mL / x     / x     / x          PT/INR - ( 02 Nov 2020 09:21 )   PT: 13.4 sec;   INR: 1.16 ratio         PTT - ( 02 Nov 2020 09:21 )  PTT:35.9 sec  Serum Pro-Brain Natriuretic Peptide: 24075 pg/mL (11-02 @ 09:21)  serum  Lipids:         RADIOLOGY & ADDITIONAL STUDIES:    ECG:    ECHO: < from: TTE Echo w/Cont Complete (10.09.19 @ 15:06) >  Summary:   1. Left ventricular ejection fraction, by visual estimation, is 55 to   60%.   2. Normal global left ventricular systolic function.   3. Mildly increased LV wall thickness.   4. Spectral Doppler shows impaired relaxation pattern of left   ventricular myocardial filling (Grade I diastolic dysfunction).   5. Normal right ventricular size and function.   6. There is no evidence of pericardial effusion.   7. Mild mitral annular calcification.   8. Thickening of the anterior and posterior mitral valve leaflets.   9. Moderate to severe aortic regurgitation.  10. Flow reversal seen in the descending aorta.  11. Ascending aorta measuring 3.8 cm.  12. May Consider MUSTAPHA for evaluation of Aortic regurgitation if clinically   indicated.  13. Discussed with Dr Sin.    < end of copied text >    < from: US Renal (11.02.20 @ 16:44) >    IMPRESSION:    No hydronephrosis.    < end of copied text >    < from: Xray Chest 1 View-PORTABLE IMMEDIATE (11.02.20 @ 10:38) >  IMPRESSION:  1. Persistent, mild interstitial pulmonary edema.  2. No bilateral focal consolidations.    < end of copied text >        ASSESSMENT AND PLAN:  In summary, JONATHAN GIBSON is a 60y Male with past medical history significant for  HTN, HLD, CAD s/p remote stent (most recent nuclear stress test 11/2019 showing infarct without ischemia), ascending aortic dissection s/p repair (2001), chronic diastolic HFpEF, moderate valvular heart disease including moderate aortic and mitral regurgitation, admitted for evaluation of shortness of breath, found to have new anemia and SANTOS in the setting of markedly elevated BP, incidentally found to have mildly elevated troponin.      - CAD. The patient has been chest pain free since admission.  Mildly elevated initial troponin0.13  is nonspecific, especially in the setting of SANTOS.  ECG reviewed personally. Mild ST abnormalities likely due to LVH. Nothing to suggest acute MI.      - Echocardiogram pending. Past echo with normal LV function with reported moderate to severe AI    - Mild acute on chronic diastolic CHF symptoms on admission resolved following IV lasix.  Would hold additional diuresis for now in setting of significant SANTOS and dose PRN as needed.      - HTN. persists. Hx of thoracic aortic dissection requiring emergent surgery. Hx of CVA. Poor compliance with medications. Increase coreg to 25mg po BID and hydralazine to 75mg po TID. Can increase hyrdralazine further if needed. If remains elevated, add amlodipine    - HL. Atorvastatin 40 mg daily    - CKD. Avoid nephrotoxic agents    - Rhythm/hemodynamics stable    - Acute anemia: one suspects patient's symptoms of progressive shortness of breath are related to anemia.  grant as per GI evaluation particularly given reports of dark stool. Also with possible hematuria.  Check UA and consider urology.     - Telemetry monitoring personally reviewed by me. SR    - ECG personally reviewed by me    - radiologic imaging reviewed    - Laboratory data reviewed.    - Covid antibody positive    - I spoke with nursing in the ER    We will follow with you    Katie Sin MD [facility], [facility]                                                    [address] [facility], [address], [place] [address]                                                         PHONE: [phone]    FAX: [phone] [address] [facility], [address], [place] [address]                                                 PHONE: [phone]    FAX: [phone]  *******************************************************************************  cc: chest pain    HPI: 60y man with HTN, HLD, CAD s/p remote stent (most recent nuclear stress test 11/2019 showing infarct without ischemia), ascending aortic dissection s/p repair (2001), chronic diastolic HFpEF, moderate valvular heart disease including moderate aortic and mitral regurgitation, who presents to the ER for evaluation of shortness of breath.  The patient states that he felt as though he couldn't breath and had to come to ER.  He reports associated chest tightness.  No diaphoresis, palpitations, syncope or near syncope.  He states that over the last few weeks at least, he's been experiencing progressive exertional shortness of breath and fatigue.  While he denies any bright red blood per rectum he admits to "dark stool" and "major change in urine color which could have been blood."  He also admits to significant weight loss but not sure how much.  Currently states that he "is feeling better" and denies any chest pain, palpitations, shortness of breath, syncope or near syncope.         Overnight events/Subjective Assessment: no new complaints. Able to lay flat without PND or orthopnea. No CP or SOB reported. No dizziness or syncope    INTERPRETATION OF TELEMETRY (personally reviewed): SR    PAST MEDICAL & SURGICAL HISTORY:  CVA (cerebral vascular accident)    CHF (congestive heart failure)    H/O colectomy    Aorta disorder        penicillin (Unknown)      MEDICATIONS  (STANDING):  aspirin enteric coated 81 milliGRAM(s) Oral daily  atorvastatin 40 milliGRAM(s) Oral at bedtime  carvedilol 12.5 milliGRAM(s) Oral every 12 hours  enoxaparin Injectable 30 milliGRAM(s) SubCutaneous daily  folic acid 1 milliGRAM(s) Oral daily  hydrALAZINE 50 milliGRAM(s) Oral three times a day  levETIRAcetam 750 milliGRAM(s) Oral two times a day  senna 2 Tablet(s) Oral at bedtime    MEDICATIONS  (PRN):  albuterol/ipratropium for Nebulization 3 milliLiter(s) Nebulizer every 6 hours PRN Bronchospasm  hydrALAZINE Injectable 10 milliGRAM(s) IV Push every 6 hours PRN for sbp above 160 mmhg  nitroglycerin     SubLingual 0.4 milliGRAM(s) SubLingual every 5 minutes PRN Chest Pain      Vital Signs Last 24 Hrs  T(C): 36.6 (03 Nov 2020 05:32), Max: 36.9 (02 Nov 2020 09:17)  T(F): 97.8 (03 Nov 2020 05:32), Max: 98.5 (02 Nov 2020 09:17)  HR: 97 (03 Nov 2020 05:32) (92 - 122)  BP: 181/82 (03 Nov 2020 05:32) (150/75 - 242/107)  BP(mean): --  RR: 98 (03 Nov 2020 05:32) (15 - 98)  SpO2: 98% (03 Nov 2020 01:37) (94% - 98%)    I&O's Detail    I&O's Summary          PHYSICAL EXAM:  General: Appears well developed, well nourished, no acute distress. not in acute pain  HEAD: normal cephalic. Atraumatic  PUPILS: equal and reactive to light  EARS: normal hearing  NECK: supple. no JVD or HJR. no carotid bruits. no visible lymphadenopathy  NOSE: no gross abnormalities  CHEST: symmetric chest wall expansion  CARDIOVASCULAR: Normal rate. Regular rhythm. Normal S1 and S2, no S3/S4,  no murmur, rub, or gallop  LUNGS: Normal effort. Normal respiratory rate. Breath sounds are clear to auscultation bilaterally. No respiratory distress. No stridor.  no rales, rhonchi or wheeze. no decreased Breath sounds  ABDOMEN: Soft, nontender, non-distended, positive bowel sounds, no mass or bruit. no abdominal tenderness. No rebound. no ascites  EXTREMITIES: No clubbing, cyanosis or edema. normal range of motion  PULSES:  distal pulses WNL  SKIN: Warm and dry with normal turgor. no visible rash or cyanosis   NEURO: Alert & oriented x 3, grossly intact with no focal weakness  PSYCH: normal mood and affect. Grossly normal insight and judgement exhibited    FAMILY HISTORY:  FH: hypertension  No family hx reported of ischemic heart disease in parents or 1st degree relatives      SOCIAL HISTORY:  no active smoking. No ETOH/No IVDA    REVIEW OF SYSTEMS:  Constitutional: no fever, chills or malaise. No weight loss  Head: no trauma  Eyes: no visual deficit. No double vision  Ears: no hearing deficit or ringing in the ears  Nose: no nose bleeds or smell changes or congestion  Throat: no difficult swallowing or painful swallowing  Neck: supple. No lymphadenopathy or swelling  Respiratory: no SOB, wheeze, asthma, COPD. No cough. No blood in the sputum  Cardiovascular: no CP, palpitations, irregular heart beats. No edema. No PND. No orthopnea. No skin/temperature or color changes  Gastrointestinal: no abdominal pain. No constipation. No diarrhea. No melena. No nausea. No vomiting. No bloating  Genitourinary: no frequency or urgency. No hematuria  Lymphatics: no grossly swollen lymph nodes  Musculoskeletal: no limitation of range of motion. Normal strength. No pain  Integumentary: no visible rash. No itching  Neurologic: no HA. No TIA or stroke symptoms. No seizure. No hx of epilepsy. No tingling or numbness. No weakness. No dizziness  Psychiatric: denied. Reports appropriate mood.        LABS:                        9.4    12.88 )-----------( 286      ( 02 Nov 2020 09:21 )             29.5     11-02    141  |  106  |  43.0<H>  ----------------------------<  125<H>  3.1<L>   |  17.0<L>  |  2.88<H>    Ca    8.6      02 Nov 2020 09:21    TPro  6.8  /  Alb  3.2<L>  /  TBili  0.4  /  DBili  x   /  AST  24  /  ALT  18  /  AlkPhos  62  11-02    CARDIAC MARKERS ( 02 Nov 2020 09:21 )  x     / 0.13 ng/mL / x     / x     / x          PT/INR - ( 02 Nov 2020 09:21 )   PT: 13.4 sec;   INR: 1.16 ratio         PTT - ( 02 Nov 2020 09:21 )  PTT:35.9 sec  Serum Pro-Brain Natriuretic Peptide: 25942 pg/mL (11-02 @ 09:21)  serum  Lipids:         RADIOLOGY & ADDITIONAL STUDIES:        ECHO: < from: TTE Echo w/Cont Complete (10.09.19 @ 15:06) >  Summary:   1. Left ventricular ejection fraction, by visual estimation, is 55 to   60%.   2. Normal global left ventricular systolic function.   3. Mildly increased LV wall thickness.   4. Spectral Doppler shows impaired relaxation pattern of left   ventricular myocardial filling (Grade I diastolic dysfunction).   5. Normal right ventricular size and function.   6. There is no evidence of pericardial effusion.   7. Mild mitral annular calcification.   8. Thickening of the anterior and posterior mitral valve leaflets.   9. Moderate to severe aortic regurgitation.  10. Flow reversal seen in the descending aorta.  11. Ascending aorta measuring 3.8 cm.  12. May Consider MUSTAPHA for evaluation of Aortic regurgitation if clinically   indicated.  13. Discussed with Dr Sin.    < end of copied text >    < from: US Renal (11.02.20 @ 16:44) >    IMPRESSION:    No hydronephrosis.    < end of copied text >    < from: Xray Chest 1 View-PORTABLE IMMEDIATE (11.02.20 @ 10:38) >  IMPRESSION:  1. Persistent, mild interstitial pulmonary edema.  2. No bilateral focal consolidations.    < end of copied text >        ASSESSMENT AND PLAN:  In summary, JONATHAN GIBSNO is a 60y Male with past medical history significant for  HTN, HLD, CAD s/p remote stent (most recent nuclear stress test 11/2019 showing infarct without ischemia), ascending aortic dissection s/p repair (2001), chronic diastolic HFpEF, moderate valvular heart disease including moderate aortic and mitral regurgitation, admitted for evaluation of shortness of breath, found to have new anemia and SANTOS in the setting of markedly elevated BP, incidentally found to have mildly elevated troponin.      - CAD. The patient has been chest pain free since admission.  Mildly elevated initial troponin0.13  is nonspecific, especially in the setting of SANTOS.  ECG reviewed personally. Mild ST abnormalities likely due to LVH. Nothing to suggest acute MI.      - Echocardiogram pending. Past echo with normal LV function with reported moderate to severe AI    - Mild acute on chronic diastolic CHF symptoms on admission resolved following IV lasix.  Would hold additional diuresis for now in setting of significant SANTOS and dose PRN as needed.      - HTN. persists. Hx of thoracic aortic dissection requiring emergent surgery. Hx of CVA. Poor compliance with medications. Increase coreg to 25mg po BID and hydralazine to 75mg po TID. Can increase hyrdralazine further if needed. If remains elevated, add amlodipine    - HL. Atorvastatin 40 mg daily    - CKD. Avoid nephrotoxic agents    - Rhythm/hemodynamics stable    - Acute anemia: one suspects patient's symptoms of progressive shortness of breath are related to anemia.  grant as per GI evaluation particularly given reports of dark stool. Also with possible hematuria.  Check UA and consider urology.     - Telemetry monitoring personally reviewed by me. SR    - ECG personally reviewed by me    - radiologic imaging reviewed    - Laboratory data reviewed.    - Covid antibody positive    - I spoke with nursing in the ER    We will follow with you    Katie Sin MD

## 2020-11-03 NOTE — CONSULT NOTE ADULT - SUBJECTIVE AND OBJECTIVE BOX
HISTORY OF PRESENT ILLNESS: This is a 60y old man with a past medical history significant for CAD s/p stent in LAD, leaky aortic valve, uncontrolled bp, thoracic aortic dissection requiring emergent surgery, CVA with left sided sensory loss, subtotal colectomy for bowel ischemia s/p reversal, psoriasis on adalimumab who presents from home with dyspnea and worsening LE edema.  The patient reported over the past several weeks, he had not been taking his medications, because he forgot.  Over the past few days and also associated with chest pressure.  Patient worked up in the ED and had an elevated pBNP, hypertensive urgency and an SANTOS.  Also noted to have iron deficiency anemia, for which GI was consulted.  Patient does not believe he has every undergone an EGD but believes he had a ?pouchoscopy with Dr. Feldman several years ago.  He denies any melenic or bloody stool.  He denies any recent NSAID use, but up until September he was taking Advil PM daily.    ROS: A 14-point review of systems was completed and was otherwise negative save what was reported in the HPI.    PAST MEDICAL/SURGICAL HISTORY:  CVA (cerebral vascular accident)  CHF (congestive heart failure)  H/O colectomy  Aorta disorder    SOCIAL HISTORY:  Used to be cocaine addict, social alcohol user and 30x 3 ppd history. quit all in 2001  FAMILY HISTORY: hypertension, everybody is an alcoholic    HOME MEDICATIONS:  amLODIPine 5 mg oral tablet: 1 tab(s) orally once a day (15 Oct 2019 09:55)  aspirin 81 mg oral delayed release tablet: 1 tab(s) orally once a day (15 Oct 2019 09:55)  atenolol 50 mg oral tablet: 1 tab(s) orally once a day (15 Oct 2019 09:55)  folic acid 1 mg oral tablet: 1 tab(s) orally once a day (15 Oct 2019 09:55)  Humira 40 mg/0.8 mL subcutaneous kit: every other week (15 Oct 2019 09:55)  Keppra 750 mg oral tablet: 1 tab(s) orally 2 times a day (15 Oct 2019 09:55)  lisinopril 5 mg oral tablet: 1 tab(s) orally once a day (15 Oct 2019 09:55)  omeprazole 20 mg oral delayed release capsule: 1 cap(s) orally once a day (15 Oct 2019 09:55)  pravastatin 20 mg oral tablet: 1 tab(s) orally once a day (15 Oct 2019 09:55)  Vitamin D3 50,000 intl units oral capsule: 1 cap(s) orally once a month (15 Oct 2019 09:55)    INPATIENT MEDICATIONS:  MEDICATIONS  (STANDING):  aspirin enteric coated 81 milliGRAM(s) Oral daily  atorvastatin 40 milliGRAM(s) Oral at bedtime  carvedilol 25 milliGRAM(s) Oral every 12 hours  enoxaparin Injectable 30 milliGRAM(s) SubCutaneous daily  folic acid 1 milliGRAM(s) Oral daily  hydrALAZINE 75 milliGRAM(s) Oral three times a day  levETIRAcetam 750 milliGRAM(s) Oral two times a day  senna 2 Tablet(s) Oral at bedtime    MEDICATIONS  (PRN):  albuterol/ipratropium for Nebulization 3 milliLiter(s) Nebulizer every 6 hours PRN Bronchospasm  hydrALAZINE Injectable 10 milliGRAM(s) IV Push every 6 hours PRN for sbp above 160 mmhg  nitroglycerin     SubLingual 0.4 milliGRAM(s) SubLingual every 5 minutes PRN Chest Pain    ALLERGIES:  penicillin (Unknown)    T(C): 37 (11-03-20 @ 16:28), Max: 37 (11-03-20 @ 16:28)  HR: 89 (11-03-20 @ 16:28) (69 - 97)  BP: 140/65 (11-03-20 @ 16:28) (129/68 - 181/82)  RR: 18 (11-03-20 @ 16:28) (16 - 98)  SpO2: 99% (11-03-20 @ 16:28) (98% - 99%)      PHYSICAL EXAM:  Constitutional: Well-developed, well-nourished, in no apparent distress  Eyes: Sclerae anicteric, conjunctivae normal  ENMT: Mucus membranes moist, no oropharyngeal thrush noted  Neck: No thyroid nodules appreciated, no significant cervical or supraclavicular lymphadenopathy  Respiratory: Breathing nonlabored; clear to auscultation  Cardiovascular: Regular rate and rhythm  Gastrointestinal: Soft, nontender, nondistended, normoactive bowel sounds; no hepatosplenomegaly appreciated; no rebound tenderness or involuntary guarding  Extremities: No clubbing or cyanosis; 2+ pretibial edema  Neurological: Alert and oriented to person, place and time; no asterixis  Skin: No jaundice  Lymph Nodes: No significant lymphadenopathy  Musculoskeletal: No significant peripheral atrophy  Psychiatric: Affect and mood appropriate      LABS:             9.4    12.88 )-----------( 286      ( 11-02 @ 09:21 )             29.5       PT/INR - ( 02 Nov 2020 09:21 )   PT: 13.4 sec;   INR: 1.16 ratio         PTT - ( 02 Nov 2020 09:21 )  PTT:35.9 sec  11-02    141  |  106  |  43.0<H>  ----------------------------<  125<H>  3.1<L>   |  17.0<L>  |  2.88<H>    Ca    8.6      02 Nov 2020 09:21    TPro  6.8  /  Alb  3.2<L>  /  TBili  0.4  /  DBili  x   /  AST  24  /  ALT  18  /  AlkPhos  62  11-02    LIVER FUNCTIONS - ( 02 Nov 2020 09:21 )  Alb: 3.2 g/dL / Pro: 6.8 g/dL / ALK PHOS: 62 U/L / ALT: 18 U/L / AST: 24 U/L / GGT: x

## 2020-11-03 NOTE — PROGRESS NOTE ADULT - ASSESSMENT
59 y/o M with PMH of CAD s/p stent in LAD, leaky aortic valve, uncontrolled bp, thoracic aortic dissection requiring emergent surgery, CVA with left sided sensory loss, colostomy for bowel ischemia s/p reversal, psoriasis on Humira injections presents from home with shortness of breath.     Acute on chronic diastolic CHF exacerbation  - Cardiology consulted, recommendations appreciated  - f/u echocardiogram  - c/w ASA 81mg daily  - c/w Coreg 25mg q12h  - c/w Hydralazine 75mg TID  - c/w Lipitor 40mg nightly    SANTOS  - renal ultrasound showing no hydronephrosis  - hold Ace inhibitor  - monitor BMP    h/o CVA  - c/w Keppra 750mg BID  - c/w ASA 81mg daily  - c/w Lipitor 40mg nightly    Elevated troponin likely secondary to SANTOS  - Cardiology recommendations appreciated    Hypertensive urgency  - resolved    Anemia  - iron studies in process  - GI consulted, recommendations appreciated   - Ferritin 230  - total iron 29  - TIBC 332    DVT ppx  - Lovenox SQ    Disposition: currently acute

## 2020-11-03 NOTE — CONSULT NOTE ADULT - ASSESSMENT
Very chronically ill 60-year-old man with severe cardiovascular disease.  His iron deficiency anemia should be evaluated endoscopically once he has been optimized from a cardiac standpoint.  Currently, he appears high risk for IV sedation.  Add pantoprazole for gastroprotection.    I would be remiss if I did not mention that anti-TNFa medications e.g. Humira (adalimumab) are contraindicated in heart failure.  (See Tito YOU, Yarely WHALEN, Sendy OROURKE, et al. Targeted anticytokine therapy in patients with chronic heart failure: results of the Randomized Etanercept World[employer] Evaluation (RENEWAL). Circulation. 2004;109:1594–1602 and Abad DUNCAN, Sendy OROURKE, Nathalie KH, et al; Anti TNFTACHFI. Randomized, double-blind, placebo-controlled,  trial of infliximab, a chimeric monoclonal antibody to tumor necrosis factor-alpha, in patients with moderate-to-severe heart failure: results of the anti-TNF Therapy against Congestive Heart Failure (ATTACH) trial. Circulation. 2003;107:3133–3140.)

## 2020-11-04 LAB
ANION GAP SERPL CALC-SCNC: 14 MMOL/L — SIGNIFICANT CHANGE UP (ref 5–17)
BUN SERPL-MCNC: 56 MG/DL — HIGH (ref 8–20)
CALCIUM SERPL-MCNC: 8.3 MG/DL — LOW (ref 8.6–10.2)
CHLORIDE SERPL-SCNC: 111 MMOL/L — HIGH (ref 98–107)
CO2 SERPL-SCNC: 18 MMOL/L — LOW (ref 22–29)
CREAT SERPL-MCNC: 3.3 MG/DL — HIGH (ref 0.5–1.3)
GAS PNL BLDA: SIGNIFICANT CHANGE UP
GLUCOSE SERPL-MCNC: 111 MG/DL — HIGH (ref 70–99)
HCT VFR BLD CALC: 25.4 % — LOW (ref 39–50)
HGB BLD-MCNC: 8 G/DL — LOW (ref 13–17)
MCHC RBC-ENTMCNC: 29.4 PG — SIGNIFICANT CHANGE UP (ref 27–34)
MCHC RBC-ENTMCNC: 31.5 GM/DL — LOW (ref 32–36)
MCV RBC AUTO: 93.4 FL — SIGNIFICANT CHANGE UP (ref 80–100)
PLATELET # BLD AUTO: 193 K/UL — SIGNIFICANT CHANGE UP (ref 150–400)
POTASSIUM SERPL-MCNC: 3.4 MMOL/L — LOW (ref 3.5–5.3)
POTASSIUM SERPL-SCNC: 3.4 MMOL/L — LOW (ref 3.5–5.3)
RBC # BLD: 2.72 M/UL — LOW (ref 4.2–5.8)
RBC # FLD: 15.2 % — HIGH (ref 10.3–14.5)
SODIUM SERPL-SCNC: 143 MMOL/L — SIGNIFICANT CHANGE UP (ref 135–145)
WBC # BLD: 8.05 K/UL — SIGNIFICANT CHANGE UP (ref 3.8–10.5)
WBC # FLD AUTO: 8.05 K/UL — SIGNIFICANT CHANGE UP (ref 3.8–10.5)

## 2020-11-04 PROCEDURE — 99233 SBSQ HOSP IP/OBS HIGH 50: CPT

## 2020-11-04 PROCEDURE — 99223 1ST HOSP IP/OBS HIGH 75: CPT

## 2020-11-04 RX ORDER — SODIUM BICARBONATE 1 MEQ/ML
650 SYRINGE (ML) INTRAVENOUS THREE TIMES A DAY
Refills: 0 | Status: DISCONTINUED | OUTPATIENT
Start: 2020-11-04 | End: 2020-11-13

## 2020-11-04 RX ORDER — ERYTHROPOIETIN 10000 [IU]/ML
10000 INJECTION, SOLUTION INTRAVENOUS; SUBCUTANEOUS
Refills: 0 | Status: DISCONTINUED | OUTPATIENT
Start: 2020-11-04 | End: 2020-11-13

## 2020-11-04 RX ORDER — IRON SUCROSE 20 MG/ML
200 INJECTION, SOLUTION INTRAVENOUS
Refills: 0 | Status: COMPLETED | OUTPATIENT
Start: 2020-11-04 | End: 2020-11-12

## 2020-11-04 RX ORDER — POTASSIUM CHLORIDE 20 MEQ
40 PACKET (EA) ORAL ONCE
Refills: 0 | Status: COMPLETED | OUTPATIENT
Start: 2020-11-04 | End: 2020-11-04

## 2020-11-04 RX ORDER — INFLUENZA VIRUS VACCINE 15; 15; 15; 15 UG/.5ML; UG/.5ML; UG/.5ML; UG/.5ML
0.5 SUSPENSION INTRAMUSCULAR ONCE
Refills: 0 | Status: DISCONTINUED | OUTPATIENT
Start: 2020-11-04 | End: 2020-11-12

## 2020-11-04 RX ORDER — IRON SUCROSE 20 MG/ML
200 INJECTION, SOLUTION INTRAVENOUS
Refills: 0 | Status: DISCONTINUED | OUTPATIENT
Start: 2020-11-04 | End: 2020-11-04

## 2020-11-04 RX ADMIN — Medication 75 MILLIGRAM(S): at 06:19

## 2020-11-04 RX ADMIN — Medication 75 MILLIGRAM(S): at 21:20

## 2020-11-04 RX ADMIN — ATORVASTATIN CALCIUM 40 MILLIGRAM(S): 80 TABLET, FILM COATED ORAL at 21:19

## 2020-11-04 RX ADMIN — LEVETIRACETAM 750 MILLIGRAM(S): 250 TABLET, FILM COATED ORAL at 17:22

## 2020-11-04 RX ADMIN — LEVETIRACETAM 750 MILLIGRAM(S): 250 TABLET, FILM COATED ORAL at 06:19

## 2020-11-04 RX ADMIN — Medication 40 MILLIEQUIVALENT(S): at 13:38

## 2020-11-04 RX ADMIN — Medication 75 MILLIGRAM(S): at 13:41

## 2020-11-04 RX ADMIN — PANTOPRAZOLE SODIUM 40 MILLIGRAM(S): 20 TABLET, DELAYED RELEASE ORAL at 06:19

## 2020-11-04 RX ADMIN — CARVEDILOL PHOSPHATE 25 MILLIGRAM(S): 80 CAPSULE, EXTENDED RELEASE ORAL at 17:22

## 2020-11-04 RX ADMIN — Medication 81 MILLIGRAM(S): at 13:37

## 2020-11-04 RX ADMIN — ERYTHROPOIETIN 10000 UNIT(S): 10000 INJECTION, SOLUTION INTRAVENOUS; SUBCUTANEOUS at 13:30

## 2020-11-04 RX ADMIN — SENNA PLUS 2 TABLET(S): 8.6 TABLET ORAL at 21:19

## 2020-11-04 RX ADMIN — Medication 650 MILLIGRAM(S): at 13:36

## 2020-11-04 RX ADMIN — Medication 1 MILLIGRAM(S): at 13:38

## 2020-11-04 RX ADMIN — CARVEDILOL PHOSPHATE 25 MILLIGRAM(S): 80 CAPSULE, EXTENDED RELEASE ORAL at 06:19

## 2020-11-04 RX ADMIN — ENOXAPARIN SODIUM 30 MILLIGRAM(S): 100 INJECTION SUBCUTANEOUS at 13:36

## 2020-11-04 RX ADMIN — Medication 650 MILLIGRAM(S): at 21:19

## 2020-11-04 RX ADMIN — IRON SUCROSE 160 MILLIGRAM(S): 20 INJECTION, SOLUTION INTRAVENOUS at 13:31

## 2020-11-04 NOTE — PROGRESS NOTE ADULT - SUBJECTIVE AND OBJECTIVE BOX
[facility], [facility]                                          [address] , [address], [place] [address]                                               PHONE: [phone]    FAX: [phone] [address], [address], [place] [address]                                       PHONE: [phone]    FAX: [phone]  *******************************************************************************    Overnight events/Subjective Assessment: Continues to report worsening exertional shortness of breath; no PND or orthopnea.  No chest pain, palpitations, syncope or near syncope.    INTERPRETATION OF TELEMETRY (personally reviewed): sinus rhythm    penicillin (Unknown)    MEDICATIONS  (STANDING):  aspirin enteric coated 81 milliGRAM(s) Oral daily  atorvastatin 40 milliGRAM(s) Oral at bedtime  carvedilol 25 milliGRAM(s) Oral every 12 hours  enoxaparin Injectable 30 milliGRAM(s) SubCutaneous daily  epoetin yolanda-epbx (RETACRIT) Injectable 42610 Unit(s) SubCutaneous every 7 days  folic acid 1 milliGRAM(s) Oral daily  hydrALAZINE 75 milliGRAM(s) Oral three times a day  iron sucrose IVPB 200 milliGRAM(s) IV Intermittent every 48 hours  levETIRAcetam 750 milliGRAM(s) Oral two times a day  pantoprazole    Tablet 40 milliGRAM(s) Oral before breakfast  senna 2 Tablet(s) Oral at bedtime  sodium bicarbonate 650 milliGRAM(s) Oral three times a day    MEDICATIONS  (PRN):  albuterol/ipratropium for Nebulization 3 milliLiter(s) Nebulizer every 6 hours PRN Bronchospasm  hydrALAZINE Injectable 10 milliGRAM(s) IV Push every 6 hours PRN for sbp above 160 mmhg  nitroglycerin     SubLingual 0.4 milliGRAM(s) SubLingual every 5 minutes PRN Chest Pain      Vital Signs Last 24 Hrs  T(C): 36.4 (04 Nov 2020 11:16), Max: 37.2 (03 Nov 2020 20:52)  T(F): 97.6 (04 Nov 2020 11:16), Max: 99 (03 Nov 2020 20:52)  HR: 74 (04 Nov 2020 11:16) (72 - 89)  BP: 116/57 (04 Nov 2020 11:16) (116/57 - 140/65)  RR: 20 (04 Nov 2020 11:16) (18 - 20)  SpO2: 96% (04 Nov 2020 11:16) (94% - 99%)      PHYSICAL EXAM:  General: Appears well developed, well nourished, no acute distress  HEENT: Head: normocephalic, atraumatic  Eyes: Pupils equal and reactive  Neck: Supple, no carotid bruit, no JVD, no HJR  CARDIOVASCULAR: Normal S1 and S2, no murmur, rub, or gallop  LUNGS: Clear to auscultation bilaterally, no rales, rhonchi or wheeze  ABDOMEN: Soft, nontender, non-distended, positive bowel sounds, no mass or bruit  EXTREMITIES: No edema, distal pulses WNL  SKIN: Warm and dry with normal turgor  NEURO: Alert & oriented x 3, grossly intact  PSYCH: normal mood and affect        LABS:                        8.0    8.05  )-----------( 193      ( 04 Nov 2020 03:15 )             25.4     11-04    143  |  111<H>  |  56.0<H>  ----------------------------<  111<H>  3.4<L>   |  18.0<L>  |  3.30<H>    Ca    8.3<L>      04 Nov 2020 03:15      Serum Pro-Brain Natriuretic Peptide: 63244 pg/mL (11-02 @ 09:21)  serum  Lipids:     Thyroid Stimulating Hormone, Serum: 1.95 uIU/mL (11-03 @ 08:18)      RADIOLOGY & ADDITIONAL STUDIES REVIEWED       ASSESSMENT AND PLAN:  In summary, JONATHAN GIBSON is a 60y man with HTN, HLD, CAD s/p remote stent (most recent nuclear stress test 11/2019 showing infarct without ischemia), ascending aortic dissection s/p repair (2001), chronic diastolic HFpEF, moderate valvular heart disease including moderate aortic and mitral regurgitation, admitted for evaluation of shortness of breath, found to have new anemia and SANTOS in the setting of markedly elevated BP, incidentally found to have mildly elevated troponin.      - Telemetry reviewed by me: all sinus.   - The patient has been chest pain free since admission.  Mildly elevated initial troponin is nonspecific, especially in the setting of SANTOS.  Nothing to suggest acute MI.  Would repeat ECG should symptoms recur.   - Echocardiogram pending.   - Mild HF symptoms on admission resolved following IV lasix.  Would hold additional diuresis for now in setting of significant SANTOS and dose as needed.    - Rhythm/hemodynamics stable: BP markedly elevated on admission now improved following restarting of PO regimen which will be continued at this time.   - Acute anemia: one suspects patient's symptoms of progressive shortness of breath are related to anemia.  Discussed with Dr. Gardner and Dr. Lopez.  GI evaluation reviewed.  There is no active cardiac condition and therefore no cardiac contraindication to proceeding EGD/colonoscopy as indicated, particularly in light of progressive symptomatic anemia (Hgb 8 today).   - HLD: continue statin at current dose.   - SANTOS: rising creatinine noted.  continue management per nephrology.   - Labs reviewed.   - Imaging reviewed.      Pepe Souza M.D.

## 2020-11-04 NOTE — CONSULT NOTE ADULT - ASSESSMENT
1) SANTOS on CKD IV  2) Acidosis  3) CHF  4) Edema    Improving breathing; feels better; not currently on diuretic  Would start procrit 10k units weekly for renal anemia;  Start sodium bicarb 650mg po TID    evan Lopez

## 2020-11-04 NOTE — CONSULT NOTE ADULT - SUBJECTIVE AND OBJECTIVE BOX
[facility] DIVISION OF KIDNEY DISEASES AND HYPERTENSION -- INITIAL CONSULT NOTE  --------------------------------------------------------------------------------  HPI:  60 yoM with PMH of CAD s/p stent in LAD, leaky aortic valve, uncontrolled bp, thoracic aortic dissection requiring emergent surgery, CVA with left sided sensory loss, colostomy for bowel ischemia s/p reversal, psoriasis on Humira injections presents from home with sob and le edema. Patient states the past several weeks, he has not been taking his medications because he forgot. Patient states the sob has gotten worse over the past few days and also associated with chest pressure. Patient also hasnt seen his cardiologist in several months. Patient worked up in the er and had an elevated bnp and htn urgency and santos. Patient seen at bedside and states feeling a little better.     Current: Pt seen/examined this AM for worsening SANTOS; history of aortic dissection in 2010 with endoleak; worsening LE edema;       PAST HISTORY  --------------------------------------------------------------------------------  PAST MEDICAL & SURGICAL HISTORY:  CVA (cerebral vascular accident)    CHF (congestive heart failure)    H/O colectomy    Aorta disorder      FAMILY HISTORY:  FH: hypertension      PAST SOCIAL HISTORY:    ALLERGIES & MEDICATIONS  --------------------------------------------------------------------------------  Allergies    penicillin (Unknown)    Intolerances      Standing Inpatient Medications  aspirin enteric coated 81 milliGRAM(s) Oral daily  atorvastatin 40 milliGRAM(s) Oral at bedtime  carvedilol 25 milliGRAM(s) Oral every 12 hours  enoxaparin Injectable 30 milliGRAM(s) SubCutaneous daily  folic acid 1 milliGRAM(s) Oral daily  hydrALAZINE 75 milliGRAM(s) Oral three times a day  iron sucrose IVPB 200 milliGRAM(s) IV Intermittent every 48 hours  levETIRAcetam 750 milliGRAM(s) Oral two times a day  pantoprazole    Tablet 40 milliGRAM(s) Oral before breakfast  senna 2 Tablet(s) Oral at bedtime    PRN Inpatient Medications  albuterol/ipratropium for Nebulization 3 milliLiter(s) Nebulizer every 6 hours PRN  hydrALAZINE Injectable 10 milliGRAM(s) IV Push every 6 hours PRN  nitroglycerin     SubLingual 0.4 milliGRAM(s) SubLingual every 5 minutes PRN      REVIEW OF SYSTEMS  --------------------------------------------------------------------------------  Gen: No weight changes, fatigue, fevers/chills, weakness  Skin: No rashes  Head/Eyes/Ears/Mouth: No headache; Normal hearing; Normal vision w/o blurriness; No sinus pain/discomfort, sore throat  Respiratory: No dyspnea, cough, wheezing, hemoptysis  CV: No chest pain, PND, orthopnea  GI: No abdominal pain, diarrhea, constipation, nausea, vomiting, melena, hematochezia  : No increased frequency, dysuria, hematuria, nocturia  MSK: No joint pain/swelling; no back pain; no edema  Neuro: No dizziness/lightheadedness, weakness, seizures, numbness, tingling  Heme: No easy bruising or bleeding  Endo: No heat/cold intolerance  Psych: No significant nervousness, anxiety, stress, depression    All other systems were reviewed and are negative, except as noted.    VITALS/PHYSICAL EXAM  --------------------------------------------------------------------------------  T(C): 36.4 (11-04-20 @ 11:16), Max: 37.2 (11-03-20 @ 20:52)  HR: 74 (11-04-20 @ 11:16) (72 - 89)  BP: 116/57 (11-04-20 @ 11:16) (116/57 - 140/65)  RR: 20 (11-04-20 @ 11:16) (18 - 20)  SpO2: 96% (11-04-20 @ 11:16) (94% - 99%)  Wt(kg): --        Physical Exam:  GENERAL: NAD, well-groomed, well-developed  HEAD:  Atraumatic, Normocephalic  EYES: EOMI, PERRLA, conjunctiva and sclera clear  ENMT: No tonsillar erythema, exudates, or enlargement; Moist mucous membranes, Good dentition, No lesions  NECK: Supple, No JVD, Normal thyroid  NERVOUS SYSTEM:  Alert & Oriented X3, Good concentration; Motor Strength 5/5 B/L upper and lower extremities; DTRs 2+ intact and symmetric  CHEST/LUNG: Clear to percussion bilaterally; No rales, rhonchi, wheezing, or rubs  HEART: Regular rate and rhythm; No murmurs, rubs, or gallops  ABDOMEN: Soft, Nontender, Nondistended; Bowel sounds present  EXTREMITIES:  2+ Peripheral Pulses, No clubbing, cyanosis, or edema  LYMPH: No lymphadenopathy noted  SKIN: No rashes or lesions    LABS/STUDIES  --------------------------------------------------------------------------------              8.0    8.05  >-----------<  193      [11-04-20 @ 03:15]              25.4     143  |  111  |  56.0  ----------------------------<  111      [11-04-20 @ 03:15]  3.4   |  18.0  |  3.30        Ca     8.3     [11-04-20 @ 03:15]            Creatinine Trend:  SCr 3.30 [11-04 @ 03:15]  SCr 2.88 [11-02 @ 09:21]        Iron 29, TIBC 332, %sat 9      [11-03-20 @ 08:18]  Ferritin 230      [11-03-20 @ 08:18]  TSH 1.95      [11-03-20 @ 08:18]  Lipid: chol 154, , HDL 44, LDL --      [11-03-20 @ 08:18]    HCV 0.19, Nonreact      [10-09-19 @ 17:28]

## 2020-11-04 NOTE — PROGRESS NOTE ADULT - SUBJECTIVE AND OBJECTIVE BOX
Chief Complaint: This is a 60y old man patient being seen in follow-up consultation for TARYN.    HPI / 24H events:  Patient continues to complain of dyspnea.  No melena, rectal bleeding or abdominal pain.  No nausea or vomiting.    ROS: A 14-point review of systems was reviewed and was otherwise negative save what was reported in the HPI.    PAST MEDICAL/SURGICAL HISTORY:  CVA (cerebral vascular accident)  CHF (congestive heart failure)  H/O colectomy  Aorta disorder    MEDICATIONS  (STANDING):  aspirin enteric coated 81 milliGRAM(s) Oral daily  atorvastatin 40 milliGRAM(s) Oral at bedtime  carvedilol 25 milliGRAM(s) Oral every 12 hours  enoxaparin Injectable 30 milliGRAM(s) SubCutaneous daily  epoetin yolanda-epbx (RETACRIT) Injectable 56594 Unit(s) SubCutaneous every 7 days  folic acid 1 milliGRAM(s) Oral daily  hydrALAZINE 75 milliGRAM(s) Oral three times a day  iron sucrose IVPB 200 milliGRAM(s) IV Intermittent every 48 hours  levETIRAcetam 750 milliGRAM(s) Oral two times a day  pantoprazole    Tablet 40 milliGRAM(s) Oral before breakfast  senna 2 Tablet(s) Oral at bedtime  sodium bicarbonate 650 milliGRAM(s) Oral three times a day    MEDICATIONS  (PRN):  albuterol/ipratropium for Nebulization 3 milliLiter(s) Nebulizer every 6 hours PRN Bronchospasm  hydrALAZINE Injectable 10 milliGRAM(s) IV Push every 6 hours PRN for sbp above 160 mmhg  nitroglycerin     SubLingual 0.4 milliGRAM(s) SubLingual every 5 minutes PRN Chest Pain    ALL: penicillin (Unknown)    T(C): 36.7 (11-04-20 @ 15:34), Max: 37.2 (11-03-20 @ 20:52)  HR: 72 (11-04-20 @ 15:34) (72 - 89)  BP: 112/59 (11-04-20 @ 15:34) (112/59 - 140/65)  RR: 20 (11-04-20 @ 15:34) (18 - 20)  SpO2: 99% (11-04-20 @ 15:34) (94% - 99%)    I&O's Summary    PHYSICAL EXAM:  Constitutional: Well-developed, well-nourished, in no apparent distress  Eyes: Sclerae anicteric, conjunctivae normal  ENMT: Mucus membranes moist, no oropharyngeal thrush noted  Neck: No thyroid nodules appreciated, no significant cervical or supraclavicular lymphadenopathy  Respiratory: Breathing nonlabored  Cardiovascular: Regular rate and rhythm  Gastrointestinal: Soft, nontender, nondistended, normoactive bowel sounds; no hepatosplenomegaly appreciated; no rebound tenderness or involuntary guarding  Extremities: 2+ edema  Neurological: Alert and oriented to person, place and time; no asterixis  Skin: No jaundice  Lymph Nodes: No significant lymphadenopathy  Musculoskeletal: No significant peripheral atrophy  Psychiatric: Affect and mood appropriate      ABG - ( 04 Nov 2020 15:14 )  pH, Arterial: 7.35  pH, Blood: x     /  pCO2: 36    /  pO2: 119   / HCO3: 20    / Base Excess: -4.8  /  SaO2: 99                       8.0    8.05  )-----------( 193      ( 11-04 @ 03:15 )             25.4                9.4    12.88 )-----------( 286      ( 11-02 @ 09:21 )             29.5       11-04    143  |  111<H>  |  56.0<H>  ----------------------------<  111<H>  3.4<L>   |  18.0<L>  |  3.30<H>    Ca    8.3<L>      04 Nov 2020 03:15      IMAGING:   < from: TTE Echo Complete w/ Contrast w/ Doppler (11.04.20 @ 08:37) >  PHYSICIAN INTERPRETATION:  Left Ventricle: Left ventricular wall thickness is moderately increased.  Global LV systolic function was mildly decreased. Left ventricular ejection fraction, by visual estimation, is 50 to 55%. Spectral Doppler shows pseudonormal pattern of left ventricular myocardial filling (Grade II diastolic dysfunction).  Right Ventricle: The right ventricular size is normal. RV systolic function is mildly reduced.  Left Atrium: Severely enlarged left atrium.  Right Atrium: Mildly enlarged right atrium.  Pericardium: Trivial pericardial effusion is present. The pericardial effusion is posterior to the left ventricle.  Mitral Valve: Thickening of the anterior and posterior mitral valve leaflets. There is mild mitral annular calcification. Moderate to severe mitral valve regurgitation is seen.  Tricuspid Valve: Moderate tricuspid regurgitation is visualized. Estimated pulmonary artery systolic pressure is 43.8 mmHg assuming a right atrial pressure of 8 mmHg, which is consistent with mild pulmonary hypertension.  Aortic Valve: Peak transaortic gradient equals 4.4 mmHg, mean transaortic gradient equals 2.3 mmHg, the calculated aortic valve area equals 3.18 cm² by the continuity equation consistent with normally opening aortic valve. Moderate to severe aortic valve regurgitation is seen.  Pulmonic Valve: Mild pulmonic valve regurgitation.  Aorta: There is dilatation of the aortic root. Aortic Root measuring 4.2 cm.  Pulmonary Artery: The main pulmonary artery is normal in size.  Venous: The inferior vena cava was dilated, with respiratory size variation greater than 50%.      Summary:   1. Left ventricular ejection fraction, by visual estimation, is 50 to 55%.   2. Mildly decreased global left ventricular systolic function.   3. Severely enlarged left atrium.   4. Moderately increased LV wall thickness.   5. Spectral Doppler shows pseudonormal pattern of left ventricular myocardial filling (Grade II diastolic dysfunction).   6. Mildly reduced RV systolic function.   7. Mildly enlarged right atrium.   8. Mild mitral annular calcification.   9. Moderate to severe mitral valve regurgitation.  10. Thickening of the anterior and posterior mitral valve leaflets.  11. Moderate tricuspid regurgitation.  12. Moderate to severe aortic regurgitation.  13. Mild pulmonic valve regurgitation.  14. Dilatation of the aortic root.  15. Aortic Root measuring 4.2 cm.  16. Estimated pulmonary artery systolic pressure is 43.8 mmHg assuming a right atrial pressure of 8 mmHg, which is consistent with mild pulmonary hypertension.    < end of copied text >

## 2020-11-04 NOTE — PROGRESS NOTE ADULT - ASSESSMENT
59 y/o M with PMH of CAD s/p stent in LAD, leaky aortic valve, uncontrolled bp, thoracic aortic dissection requiring emergent surgery, CVA with left sided sensory loss, colostomy for bowel ischemia s/p reversal, psoriasis on Humira injections presents from home with shortness of breath.     Shortness of breath secondary Acute on chronic diastolic CHF exacerbation vs symptomatic anemia  - Cardiology consulted, recommendations appreciated  - c/w ASA 81mg daily  - c/w Coreg 25mg q12h  - c/w Hydralazine 75mg TID  - c/w Lipitor 40mg nightly  - echocardiogram performed: EF 50 to 55%, moderate to severe mitral regurgitation, estimated PASP 43.8 consistent with mild pulmonary hypertension  - repeat pro BNP  - Ferritin 230  - total iron 29  - TIBC 332  - H/H dropped from 9.4/29.5 to 8/25.4  - Per Cardiology patient no longer with active cardiac condition and no cardiac contraindication to proceeding with EGD/Colonoscopy as indicated  - GI recommendations appreciated    SANTOS  - renal ultrasound showing no hydronephrosis  - Nephrology consulted, recommendations appreciated  - start Venofer   - hold Ace inhibitor  - monitor BMP    h/o CVA  - c/w Keppra 750mg BID  - c/w ASA 81mg daily  - c/w Lipitor 40mg nightly    Elevated troponin likely secondary to SANTOS  - Cardiology recommendations appreciated    DVT ppx  - Lovenox SQ    Disposition: currently acute

## 2020-11-04 NOTE — PROGRESS NOTE ADULT - SUBJECTIVE AND OBJECTIVE BOX
Chief complaint: Shortness of breath    Patient seen and examined at bedside. No acute overnight events reported. Patient states he is short of breath and reports that when he walks from the bathroom to his bed he is excessively short of breath. Denies headache, fever, chills, chest pain, nausea, vomiting, abdominal pain, diarrhea or constipation.     Vital Signs Last 24 Hrs  T(F): 97.6 (04 Nov 2020 11:16), Max: 99 (03 Nov 2020 20:52)  HR: 74 (04 Nov 2020 11:16) (72 - 89)  BP: 116/57 (04 Nov 2020 11:16) (116/57 - 140/65)  RR: 20 (04 Nov 2020 11:16) (18 - 20)  SpO2: 96% (04 Nov 2020 11:16) (94% - 99%)    Physical Exam:  Constitutional: alert and oriented, in no acute distress   Neck: Soft and supple, No LAD, No JVD  Respiratory: Clear to auscultation bilaterally, no wheezes or crackles  Cardiovascular: Regular rate and rhythm no murmurs, gallops, rubs  Gastrointestinal: Soft, non-tender to palpation, +bs  Vascular: 2+ peripheral pulses  Neurological: A/O x 3, no focal neurological deficits  Musculoskeletal:  no lower extremity edema bilaterally    Labs:                        8.0    8.05  )-----------( 193      ( 04 Nov 2020 03:15 )             25.4   11-04    143  |  111<H>  |  56.0<H>  ----------------------------<  111<H>  3.4<L>   |  18.0<L>  |  3.30<H>    Ca    8.3<L>      04 Nov 2020 03:15

## 2020-11-04 NOTE — PROGRESS NOTE ADULT - ASSESSMENT
Patient is in decompensated heart failure with SANTOS on CKD.  Has TARYN with worsened hemoglobin today.  All cell lines are down, suggestive of hemodilution.  He should undergo an EGD and colonoscopy but does not appear to be medically optimized.  Will reassess tomorrow for suitability for EGD and colonoscopy.

## 2020-11-05 DIAGNOSIS — D64.89 OTHER SPECIFIED ANEMIAS: ICD-10-CM

## 2020-11-05 DIAGNOSIS — I08.0 RHEUMATIC DISORDERS OF BOTH MITRAL AND AORTIC VALVES: ICD-10-CM

## 2020-11-05 DIAGNOSIS — I63.9 CEREBRAL INFARCTION, UNSPECIFIED: ICD-10-CM

## 2020-11-05 DIAGNOSIS — I50.31 ACUTE DIASTOLIC (CONGESTIVE) HEART FAILURE: ICD-10-CM

## 2020-11-05 DIAGNOSIS — N17.9 ACUTE KIDNEY FAILURE, UNSPECIFIED: ICD-10-CM

## 2020-11-05 LAB
ANION GAP SERPL CALC-SCNC: 14 MMOL/L — SIGNIFICANT CHANGE UP (ref 5–17)
BUN SERPL-MCNC: 61 MG/DL — HIGH (ref 8–20)
CALCIUM SERPL-MCNC: 8.3 MG/DL — LOW (ref 8.6–10.2)
CHLORIDE SERPL-SCNC: 110 MMOL/L — HIGH (ref 98–107)
CO2 SERPL-SCNC: 19 MMOL/L — LOW (ref 22–29)
CREAT SERPL-MCNC: 3.18 MG/DL — HIGH (ref 0.5–1.3)
GLUCOSE SERPL-MCNC: 111 MG/DL — HIGH (ref 70–99)
HCT VFR BLD CALC: 25 % — LOW (ref 39–50)
HGB BLD-MCNC: 7.8 G/DL — LOW (ref 13–17)
MAGNESIUM SERPL-MCNC: 1.9 MG/DL — SIGNIFICANT CHANGE UP (ref 1.6–2.6)
MCHC RBC-ENTMCNC: 29.3 PG — SIGNIFICANT CHANGE UP (ref 27–34)
MCHC RBC-ENTMCNC: 31.2 GM/DL — LOW (ref 32–36)
MCV RBC AUTO: 94 FL — SIGNIFICANT CHANGE UP (ref 80–100)
NT-PROBNP SERPL-SCNC: HIGH PG/ML (ref 0–300)
OB PNL STL: NEGATIVE — SIGNIFICANT CHANGE UP
PHOSPHATE SERPL-MCNC: 5.3 MG/DL — HIGH (ref 2.4–4.7)
PLATELET # BLD AUTO: 212 K/UL — SIGNIFICANT CHANGE UP (ref 150–400)
POTASSIUM SERPL-MCNC: 3.7 MMOL/L — SIGNIFICANT CHANGE UP (ref 3.5–5.3)
POTASSIUM SERPL-SCNC: 3.7 MMOL/L — SIGNIFICANT CHANGE UP (ref 3.5–5.3)
RBC # BLD: 2.66 M/UL — LOW (ref 4.2–5.8)
RBC # FLD: 15.4 % — HIGH (ref 10.3–14.5)
SODIUM SERPL-SCNC: 143 MMOL/L — SIGNIFICANT CHANGE UP (ref 135–145)
WBC # BLD: 7.91 K/UL — SIGNIFICANT CHANGE UP (ref 3.8–10.5)
WBC # FLD AUTO: 7.91 K/UL — SIGNIFICANT CHANGE UP (ref 3.8–10.5)

## 2020-11-05 PROCEDURE — 99233 SBSQ HOSP IP/OBS HIGH 50: CPT

## 2020-11-05 PROCEDURE — 99223 1ST HOSP IP/OBS HIGH 75: CPT

## 2020-11-05 PROCEDURE — 93880 EXTRACRANIAL BILAT STUDY: CPT | Mod: 26

## 2020-11-05 RX ADMIN — Medication 1 MILLIGRAM(S): at 11:36

## 2020-11-05 RX ADMIN — CARVEDILOL PHOSPHATE 25 MILLIGRAM(S): 80 CAPSULE, EXTENDED RELEASE ORAL at 17:50

## 2020-11-05 RX ADMIN — Medication 81 MILLIGRAM(S): at 11:36

## 2020-11-05 RX ADMIN — ENOXAPARIN SODIUM 30 MILLIGRAM(S): 100 INJECTION SUBCUTANEOUS at 11:36

## 2020-11-05 RX ADMIN — Medication 650 MILLIGRAM(S): at 21:00

## 2020-11-05 RX ADMIN — ATORVASTATIN CALCIUM 40 MILLIGRAM(S): 80 TABLET, FILM COATED ORAL at 21:00

## 2020-11-05 RX ADMIN — SENNA PLUS 2 TABLET(S): 8.6 TABLET ORAL at 21:00

## 2020-11-05 RX ADMIN — LEVETIRACETAM 750 MILLIGRAM(S): 250 TABLET, FILM COATED ORAL at 17:50

## 2020-11-05 RX ADMIN — Medication 650 MILLIGRAM(S): at 13:17

## 2020-11-05 RX ADMIN — Medication 75 MILLIGRAM(S): at 05:12

## 2020-11-05 RX ADMIN — PANTOPRAZOLE SODIUM 40 MILLIGRAM(S): 20 TABLET, DELAYED RELEASE ORAL at 05:12

## 2020-11-05 RX ADMIN — Medication 75 MILLIGRAM(S): at 21:00

## 2020-11-05 RX ADMIN — CARVEDILOL PHOSPHATE 25 MILLIGRAM(S): 80 CAPSULE, EXTENDED RELEASE ORAL at 05:12

## 2020-11-05 RX ADMIN — Medication 650 MILLIGRAM(S): at 05:12

## 2020-11-05 RX ADMIN — LEVETIRACETAM 750 MILLIGRAM(S): 250 TABLET, FILM COATED ORAL at 05:12

## 2020-11-05 NOTE — PROGRESS NOTE ADULT - SUBJECTIVE AND OBJECTIVE BOX
Chief complaint: Shortness of breath    Patient seen and examined at bedside. No acute overnight events reported. Patient states he is doing well and currently has no complaints. Denies headache, fever, chills, cough, chest pain, shortness of breath, nausea, vomiting, abdominal pain, diarrhea or constipation.     Vital Signs Last 24 Hrs  T(F): 98 (05 Nov 2020 05:22), Max: 99 (04 Nov 2020 18:10)  HR: 66 (05 Nov 2020 07:55) (66 - 90)  BP: 126/66 (05 Nov 2020 05:22) (112/59 - 149/70)  RR: 18 (05 Nov 2020 05:22) (18 - 20)  SpO2: 98% (05 Nov 2020 09:05) (94% - 99%)    Physical Exam:  Constitutional: alert and oriented, in no acute distress, saturating 98% on room air   Neck: Soft and supple, No LAD, No JVD  Respiratory: Clear to auscultation bilaterally, no wheezes or crackles  Cardiovascular: Regular rate and rhythm no murmurs, gallops, rubs  Gastrointestinal: Soft, non-tender to palpation, +bs  Vascular: 2+ peripheral pulses  Neurological: A/O x 3, no focal neurological deficits  Musculoskeletal: trace bilateral lower extremity edema    Labs:                        7.8    7.91  )-----------( 212      ( 05 Nov 2020 01:31 )             25.0   11-05    143  |  110<H>  |  61.0<H>  ----------------------------<  111<H>  3.7   |  19.0<L>  |  3.18<H>    Ca    8.3<L>      05 Nov 2020 01:31  Phos  5.3     11-05  Mg     1.9     11-05

## 2020-11-05 NOTE — PROGRESS NOTE ADULT - SUBJECTIVE AND OBJECTIVE BOX
Patient is a 60y old  Male who presents with a chief complaint of sob (05 Nov 2020 11:56)      HPI:  Patient is a 60 yoM with PMH of CAD s/p stent in LAD, leaky aortic valve, uncontrolled bp, thoracic aortic dissection requiring emergent surgery, CVA with left sided sensory loss, colostomy for bowel ischemia s/p reversal, psoriasis on Humira injections presents from home with sob and le edema.  + CHF. + anemia, guiac negative stools. NO abdominal pain, tolerating  diet. AS per cardiology, pt with SOB improving, but breathing is not at baseline. + Episode of V tach this am      REVIEW OF SYSTEMS:  Constitutional: No fever, weight loss or fatigue  ENMT:  No difficulty hearing, tinnitus, vertigo; No sinus or throat pain  Respiratory: No cough, wheezing, chills or hemoptysis  Cardiovascular: No chest pain, palpitations, dizziness or leg swelling  Gastrointestinal: No abdominal or epigastric pain. No nausea, vomiting or hematemesis; No diarrhea or constipation. No melena or hematochezia.  Skin: No itching, burning, rashes or lesions   Musculoskeletal: No joint pain or swelling; No muscle, back or extremity pain    PAST MEDICAL & SURGICAL HISTORY:  CVA (cerebral vascular accident)    CHF (congestive heart failure)    H/O colectomy    Aorta disorder        FAMILY HISTORY:  FH: hypertension        SOCIAL HISTORY:  Smoking Status: [ ] Current, [ ] Former, [ ] Never  Pack Years:  [  ] EtOH-no  [  ] IVDA    MEDICATIONS:  MEDICATIONS  (STANDING):  aspirin enteric coated 81 milliGRAM(s) Oral daily  atorvastatin 40 milliGRAM(s) Oral at bedtime  carvedilol 25 milliGRAM(s) Oral every 12 hours  enoxaparin Injectable 30 milliGRAM(s) SubCutaneous daily  epoetin yolanda-epbx (RETACRIT) Injectable 80045 Unit(s) SubCutaneous every 7 days  folic acid 1 milliGRAM(s) Oral daily  hydrALAZINE 75 milliGRAM(s) Oral three times a day  influenza   Vaccine 0.5 milliLiter(s) IntraMuscular once  iron sucrose IVPB 200 milliGRAM(s) IV Intermittent every 48 hours  levETIRAcetam 750 milliGRAM(s) Oral two times a day  pantoprazole    Tablet 40 milliGRAM(s) Oral before breakfast  senna 2 Tablet(s) Oral at bedtime  sodium bicarbonate 650 milliGRAM(s) Oral three times a day    MEDICATIONS  (PRN):  albuterol/ipratropium for Nebulization 3 milliLiter(s) Nebulizer every 6 hours PRN Bronchospasm  hydrALAZINE Injectable 10 milliGRAM(s) IV Push every 6 hours PRN for sbp above 160 mmhg  nitroglycerin     SubLingual 0.4 milliGRAM(s) SubLingual every 5 minutes PRN Chest Pain      Allergies    penicillin (Unknown)    Intolerances        Vital Signs Last 24 Hrs  T(C): 36.7 (05 Nov 2020 05:22), Max: 37.2 (04 Nov 2020 18:10)  T(F): 98 (05 Nov 2020 05:22), Max: 99 (04 Nov 2020 18:10)  HR: 66 (05 Nov 2020 07:55) (66 - 90)  BP: 126/66 (05 Nov 2020 05:22) (112/59 - 149/70)  BP(mean): --  RR: 18 (05 Nov 2020 05:22) (18 - 20)  SpO2: 98% (05 Nov 2020 09:05) (94% - 99%)        PHYSICAL EXAM:    General: Well developed; well nourished; in no acute distress  HEENT: MMM, conjunctiva and sclera clear  Gastrointestinal: Soft, non-tender non-distended; Normal bowel sounds; No rebound or guarding  Extremities: Normal range of motion, No clubbing, cyanosis or edema  Neurological: Alert and oriented x3  Skin: Warm and dry. No obvious rash      LABS:                        7.8    7.91  )-----------( 212      ( 05 Nov 2020 01:31 )             25.0     05 Nov 2020 01:31    143    |  110    |  61.0   ----------------------------<  111    3.7     |  19.0   |  3.18     Ca    8.3        05 Nov 2020 01:31  Phos  5.3       05 Nov 2020 01:31  Mg     1.9       05 Nov 2020 01:31                RADIOLOGY & ADDITIONAL STUDIES:     < from: Xray Chest 1 View-PORTABLE IMMEDIATE (11.02.20 @ 10:38) >  MPRESSION:  1. Persistent, mild interstitial pulmonary edema.  2. No bilateral focal consolidations.      < end of copied text >

## 2020-11-05 NOTE — PROGRESS NOTE ADULT - SUBJECTIVE AND OBJECTIVE BOX
[facility], [facility]                                          [address] , [address], [place] [address]                                               PHONE: [phone]    FAX: [phone] [address], [address], [place] [address]                                       PHONE: [phone]    FAX: [phone]  *******************************************************************************    Overnight events/Subjective Assessment: breathing improving, but not yet baseline.  No CP or palpitations.  No overnight cardiac events    INTERPRETATION OF TELEMETRY (personally reviewed): SR 70-100s, PVCs, trigeminy, 3 beat and 4 beat NSVT    penicillin (Unknown)    MEDICATIONS  (STANDING):  aspirin enteric coated 81 milliGRAM(s) Oral daily  atorvastatin 40 milliGRAM(s) Oral at bedtime  carvedilol 25 milliGRAM(s) Oral every 12 hours  enoxaparin Injectable 30 milliGRAM(s) SubCutaneous daily  epoetin yolanda-epbx (RETACRIT) Injectable 93724 Unit(s) SubCutaneous every 7 days  folic acid 1 milliGRAM(s) Oral daily  hydrALAZINE 75 milliGRAM(s) Oral three times a day  influenza   Vaccine 0.5 milliLiter(s) IntraMuscular once  iron sucrose IVPB 200 milliGRAM(s) IV Intermittent every 48 hours  levETIRAcetam 750 milliGRAM(s) Oral two times a day  pantoprazole    Tablet 40 milliGRAM(s) Oral before breakfast  senna 2 Tablet(s) Oral at bedtime  sodium bicarbonate 650 milliGRAM(s) Oral three times a day    MEDICATIONS  (PRN):  albuterol/ipratropium for Nebulization 3 milliLiter(s) Nebulizer every 6 hours PRN Bronchospasm  hydrALAZINE Injectable 10 milliGRAM(s) IV Push every 6 hours PRN for sbp above 160 mmhg  nitroglycerin     SubLingual 0.4 milliGRAM(s) SubLingual every 5 minutes PRN Chest Pain      Vital Signs Last 24 Hrs  T(C): 36.7 (05 Nov 2020 05:22), Max: 37.2 (04 Nov 2020 18:10)  T(F): 98 (05 Nov 2020 05:22), Max: 99 (04 Nov 2020 18:10)  HR: 72 (05 Nov 2020 05:22) (72 - 90)  BP: 126/66 (05 Nov 2020 05:22) (112/59 - 149/70)  BP(mean): --  RR: 18 (05 Nov 2020 05:22) (18 - 20)  SpO2: 95% (05 Nov 2020 05:22) (95% - 99%)    I&O's Detail    I&O's Summary          PHYSICAL EXAM:  General: Appears well developed, well nourished, no acute distress, laying flat  HEENT: Head: normocephalic, atraumatic  Eyes: Pupils equal and reactive  Neck: Supple, no carotid bruit, no JVD, no HJR  CARDIOVASCULAR: Normal S1 and S2, 2/6 systolic murmur at base and apex  LUNGS: mildly adventitious bilat L > R, no rales, rhonchi or wheeze  ABDOMEN: Soft, nontender, non-distended, positive bowel sounds, no mass or bruit  EXTREMITIES: trace to 1+ edema to shins (improved per pt)  SKIN: Warm and dry with normal turgor  NEURO: Alert & oriented x 3, grossly intact  PSYCH: normal mood and affect        LABS:                        7.8    7.91  )-----------( 212      ( 05 Nov 2020 01:31 )             25.0     11-05    143  |  110<H>  |  61.0<H>  ----------------------------<  111<H>  3.7   |  19.0<L>  |  3.18<H>    Ca    8.3<L>      05 Nov 2020 01:31  Phos  5.3     11-05  Mg     1.9     11-05            Serum Pro-Brain Natriuretic Peptide: 51114 pg/mL (11-05 @ 01:31)  Serum Pro-Brain Natriuretic Peptide: 80939 pg/mL (11-02 @ 09:21)  serum  Lipids:     Thyroid Stimulating Hormone, Serum: 1.95 uIU/mL (11-03 @ 08:18)      RADIOLOGY & ADDITIONAL STUDIES:    ECHO:  < from: TTE Echo Complete w/ Contrast w/ Doppler (11.04.20 @ 08:37) >    PHYSICIAN INTERPRETATION:  Left Ventricle: Left ventricular wall thickness is moderately increased.  Global LV systolic function was mildly decreased. Left ventricular ejection fraction, by visual estimation, is 50 to 55%. Spectral Doppler shows pseudonormal pattern of left ventricular myocardial filling (Grade II diastolic dysfunction).  Right Ventricle: The right ventricular size is normal. RV systolic function is mildly reduced.  Left Atrium: Severely enlarged left atrium.  Right Atrium: Mildly enlarged right atrium.  Pericardium: Trivial pericardial effusion is present. The pericardial effusion is posterior to the left ventricle.  Mitral Valve: Thickening of the anterior and posterior mitral valve leaflets. There is mild mitral annular calcification. Moderate to severe mitral valve regurgitation is seen.  Tricuspid Valve: Moderate tricuspid regurgitation is visualized. Estimated pulmonary artery systolic pressure is 43.8 mmHg assuming a right atrial pressure of 8 mmHg, which is consistent with mild pulmonary hypertension.  Aortic Valve: Peak transaortic gradient equals 4.4 mmHg, mean transaortic gradient equals 2.3 mmHg, the calculated aortic valve area equals 3.18 cm² by the continuity equation consistent with normally opening aortic valve. Moderate to severe aortic valve regurgitation is seen.  Pulmonic Valve: Mild pulmonic valve regurgitation.  Aorta: There is dilatation of the aortic root. Aortic Root measuring 4.2 cm.  Pulmonary Artery: The main pulmonary artery is normal in size.  Venous: The inferior vena cava was dilated, with respiratory size variation greater than 50%.      Summary:   1. Left ventricular ejection fraction, by visual estimation, is 50 to 55%.   2. Mildly decreased global left ventricular systolic function.   3. Severely enlarged left atrium.   4. Moderately increased LV wall thickness.   5. Spectral Doppler shows pseudonormal pattern of left ventricular myocardial filling (Grade II diastolic dysfunction).   6. Mildly reduced RV systolic function.   7. Mildly enlarged right atrium.   8. Mild mitral annular calcification.   9. Moderate to severe mitral valve regurgitation.  10. Thickening of the anterior and posterior mitral valve leaflets.  11. Moderate tricuspid regurgitation.  12. Moderate to severe aortic regurgitation.  13. Mild pulmonic valve regurgitation.  14. Dilatation of the aortic root.  15. Aortic Root measuring 4.2 cm.  16. Estimated pulmonary artery systolic pressure is 43.8 mmHg assuming a right atrial pressure of 8 mmHg, which is consistent with mild pulmonary hypertension.      < end of copied text >        ASSESSMENT AND PLAN:  In summary, JONATHAN GIBSON is a 60y man with HTN, HLD, CAD s/p remote stent (most recent nuclear stress test 11/2019 showing infarct without ischemia), ascending aortic dissection s/p repair (2001), chronic diastolic HFpEF, moderate valvular heart disease including moderate aortic and mitral regurgitation, admitted for evaluation of shortness of breath, found to have new anemia and SANTOS in the setting of markedly elevated BP, incidentally found to have mildly elevated troponin.      - The patient has been chest pain free since admission.  Mildly elevated initial troponin is nonspecific, especially in the setting of SANTOS.  Nothing to suggest acute MI.  Would repeat ECG should symptoms recur.   - Echocardiogram 11/4/20 EF 50-55%, grade II diastolic dysfunction, mildly reduced RV systolic function, severe LAE, mild KOLTON, mod-severe MR and AI, mod TR, dilated aortic root @ 4.2cm  - Mild HF symptoms (acute on chronic diastolic/valvular HF) on admission improving following IV lasix.  Renal function worsened with diuresis and is improving with discontinuation of diuretic.  BNP improved, but still elevated.  Will continue to monitor symptoms.  If worsening symptoms will need additional diuresis, possibly with inotropic assistance.  - Rhythm/hemodynamics stable: BP markedly elevated on admission now improved following restarting of PO regimen.  Continue Coreg 25mg BID and Hydralazine 75mg TID; titrate prn   - Acute anemia, worsening: symptoms of progressive shortness of breath are at least in part related to anemia. There is no significant active cardiac condition and therefore no absolute cardiac contraindication to proceeding with  EGD/colonoscopy as indicated, particularly in light of progressive symptomatic anemia  - ASA 81mg daily needs to be continued in an uninterrupted fashion given CAD with prior coronary stents.  - HLD: continue lipitor 40   - Keep K > 4, Mg > 2        Celso Stockton MD

## 2020-11-05 NOTE — CONSULT NOTE ADULT - PROBLEM SELECTOR RECOMMENDATION 3
May be due to heart failure, COVID-19 infection or Humira  Patient may require dialysis to optimize, place in community PRIOR to open heart surgery

## 2020-11-05 NOTE — PROGRESS NOTE ADULT - SUBJECTIVE AND OBJECTIVE BOX
[facility] DIVISION OF KIDNEY DISEASES AND HYPERTENSION -- FOLLOW UP NOTE  --------------------------------------------------------------------------------  Chief Complaint:    24 hour events/subjective:        PAST HISTORY  --------------------------------------------------------------------------------  No significant changes to PMH, PSH, FHx, SHx, unless otherwise noted    ALLERGIES & MEDICATIONS  --------------------------------------------------------------------------------  Allergies    penicillin (Unknown)    Intolerances      Standing Inpatient Medications  aspirin enteric coated 81 milliGRAM(s) Oral daily  atorvastatin 40 milliGRAM(s) Oral at bedtime  carvedilol 25 milliGRAM(s) Oral every 12 hours  enoxaparin Injectable 30 milliGRAM(s) SubCutaneous daily  epoetin yolanda-epbx (RETACRIT) Injectable 53976 Unit(s) SubCutaneous every 7 days  folic acid 1 milliGRAM(s) Oral daily  hydrALAZINE 75 milliGRAM(s) Oral three times a day  influenza   Vaccine 0.5 milliLiter(s) IntraMuscular once  iron sucrose IVPB 200 milliGRAM(s) IV Intermittent every 48 hours  levETIRAcetam 750 milliGRAM(s) Oral two times a day  pantoprazole    Tablet 40 milliGRAM(s) Oral before breakfast  senna 2 Tablet(s) Oral at bedtime  sodium bicarbonate 650 milliGRAM(s) Oral three times a day    PRN Inpatient Medications  albuterol/ipratropium for Nebulization 3 milliLiter(s) Nebulizer every 6 hours PRN  hydrALAZINE Injectable 10 milliGRAM(s) IV Push every 6 hours PRN  nitroglycerin     SubLingual 0.4 milliGRAM(s) SubLingual every 5 minutes PRN      REVIEW OF SYSTEMS  --------------------------------------------------------------------------------  Gen: No weight changes, fatigue, fevers/chills, weakness  Skin: No rashes  Head/Eyes/Ears/Mouth: No headache; Normal hearing; Normal vision w/o blurriness; No sinus pain/discomfort, sore throat  Respiratory: No dyspnea, cough, wheezing, hemoptysis  CV: No chest pain, PND, orthopnea  GI: No abdominal pain, diarrhea, constipation, nausea, vomiting, melena, hematochezia  : No increased frequency, dysuria, hematuria, nocturia  MSK: No joint pain/swelling; no back pain; no edema  Neuro: No dizziness/lightheadedness, weakness, seizures, numbness, tingling  Heme: No easy bruising or bleeding  Endo: No heat/cold intolerance  Psych: No significant nervousness, anxiety, stress, depression    All other systems were reviewed and are negative, except as noted.    VITALS/PHYSICAL EXAM  --------------------------------------------------------------------------------  T(C): 36.7 (11-05-20 @ 05:22), Max: 37.2 (11-04-20 @ 18:10)  HR: 66 (11-05-20 @ 07:55) (66 - 90)  BP: 126/66 (11-05-20 @ 05:22) (112/59 - 149/70)  RR: 18 (11-05-20 @ 05:22) (18 - 20)  SpO2: 98% (11-05-20 @ 09:05) (94% - 99%)  Wt(kg): --        Physical Exam:  	Gen: NAD, well-appearing  	HEENT: PERRL, supple neck, clear oropharynx  	Pulm: CTA B/L  	CV: RRR, S1S2; no rub  	Back: No spinal or CVA tenderness; no sacral edema  	Abd: +BS, soft, nontender/nondistended  	: No suprapubic tenderness  	UE: Warm, FROM, no clubbing, intact strength; no edema; no asterixis  	LE: Warm, FROM, no clubbing, intact strength; no edema  	Neuro: No focal deficits, intact gait  	Psych: Normal affect and mood  	Skin: Warm, without rashes  	Vascular access:    LABS/STUDIES  --------------------------------------------------------------------------------              7.8    7.91  >-----------<  212      [11-05-20 @ 01:31]              25.0     143  |  110  |  61.0  ----------------------------<  111      [11-05-20 @ 01:31]  3.7   |  19.0  |  3.18        Ca     8.3     [11-05-20 @ 01:31]      Mg     1.9     [11-05-20 @ 01:31]      Phos  5.3     [11-05-20 @ 01:31]            Creatinine Trend:  SCr 3.18 [11-05 @ 01:31]  SCr 3.30 [11-04 @ 03:15]  SCr 2.88 [11-02 @ 09:21]        Iron 29, TIBC 332, %sat 9      [11-03-20 @ 08:18]  Ferritin 230      [11-03-20 @ 08:18]  TSH 1.95      [11-03-20 @ 08:18]  Lipid: chol 154, , HDL 44, LDL --      [11-03-20 @ 08:18]       [facility] DIVISION OF KIDNEY DISEASES AND HYPERTENSION -- FOLLOW UP NOTE  --------------------------------------------------------------------------------  Chief Complaint:   eric    24 hour events/subjective:  no acute event  pt seen and examined  feels well    PAST HISTORY  --------------------------------------------------------------------------------  No significant changes to PMH, PSH, FHx, SHx, unless otherwise noted    ALLERGIES & MEDICATIONS  --------------------------------------------------------------------------------  Allergies    penicillin (Unknown)    Intolerances      Standing Inpatient Medications  aspirin enteric coated 81 milliGRAM(s) Oral daily  atorvastatin 40 milliGRAM(s) Oral at bedtime  carvedilol 25 milliGRAM(s) Oral every 12 hours  enoxaparin Injectable 30 milliGRAM(s) SubCutaneous daily  epoetin yolanda-epbx (RETACRIT) Injectable 86889 Unit(s) SubCutaneous every 7 days  folic acid 1 milliGRAM(s) Oral daily  hydrALAZINE 75 milliGRAM(s) Oral three times a day  influenza   Vaccine 0.5 milliLiter(s) IntraMuscular once  iron sucrose IVPB 200 milliGRAM(s) IV Intermittent every 48 hours  levETIRAcetam 750 milliGRAM(s) Oral two times a day  pantoprazole    Tablet 40 milliGRAM(s) Oral before breakfast  senna 2 Tablet(s) Oral at bedtime  sodium bicarbonate 650 milliGRAM(s) Oral three times a day    PRN Inpatient Medications  albuterol/ipratropium for Nebulization 3 milliLiter(s) Nebulizer every 6 hours PRN  hydrALAZINE Injectable 10 milliGRAM(s) IV Push every 6 hours PRN  nitroglycerin     SubLingual 0.4 milliGRAM(s) SubLingual every 5 minutes PRN      REVIEW OF SYSTEMS  --------------------------------------------------------------------------------  Gen: No weight changes, fatigue, fevers/chills, weakness  Skin: No rashes  Head/Eyes/Ears/Mouth: No headache; Normal hearing; Normal vision w/o blurriness; No sinus pain/discomfort, sore throat  Respiratory: No dyspnea, cough, wheezing, hemoptysis  CV: No chest pain, PND, orthopnea  GI: No abdominal pain, diarrhea, constipation, nausea, vomiting, melena, hematochezia  : No increased frequency, dysuria, hematuria, nocturia  MSK: No joint pain/swelling; no back pain; no edema  Neuro: No dizziness/lightheadedness, weakness, seizures, numbness, tingling  Heme: No easy bruising or bleeding  Endo: No heat/cold intolerance  Psych: No significant nervousness, anxiety, stress, depression    All other systems were reviewed and are negative, except as noted.    VITALS/PHYSICAL EXAM  --------------------------------------------------------------------------------  T(C): 36.7 (11-05-20 @ 05:22), Max: 37.2 (11-04-20 @ 18:10)  HR: 66 (11-05-20 @ 07:55) (66 - 90)  BP: 126/66 (11-05-20 @ 05:22) (112/59 - 149/70)  RR: 18 (11-05-20 @ 05:22) (18 - 20)  SpO2: 98% (11-05-20 @ 09:05) (94% - 99%)  Wt(kg): --        Physical Exam:  	Gen: NAD  	HEENT: PERRL, supple neck, clear oropharynx  	Pulm: CTA B/L  	CV: RRR, S1S2; no rub  	Back: No spinal or CVA tenderness; no sacral edema  	Abd: +BS, soft, nontender/nondistended  	: No suprapubic tenderness  	UE: Warm, no edema               LE: Warm,  no edema  	Neuro: No focal deficits  	Psych: Normal affect and mood  	Skin: Warm,  LABS/STUDIES  --------------------------------------------------------------------------------              7.8    7.91  >-----------<  212      [11-05-20 @ 01:31]              25.0     143  |  110  |  61.0  ----------------------------<  111      [11-05-20 @ 01:31]  3.7   |  19.0  |  3.18        Ca     8.3     [11-05-20 @ 01:31]      Mg     1.9     [11-05-20 @ 01:31]      Phos  5.3     [11-05-20 @ 01:31]            Creatinine Trend:  SCr 3.18 [11-05 @ 01:31]  SCr 3.30 [11-04 @ 03:15]  SCr 2.88 [11-02 @ 09:21]        Iron 29, TIBC 332, %sat 9      [11-03-20 @ 08:18]  Ferritin 230      [11-03-20 @ 08:18]  TSH 1.95      [11-03-20 @ 08:18]  Lipid: chol 154, , HDL 44, LDL --      [11-03-20 @ 08:18]

## 2020-11-05 NOTE — CONSULT NOTE ADULT - PROBLEM SELECTOR RECOMMENDATION 9
Patient may be a candidate for a reop AVR/MVR per Dr Butler.  Order preop workup: Carotid doppler, PFTs, labs  Will need catheterization  Will discuss with Dr Butler

## 2020-11-05 NOTE — CONSULT NOTE ADULT - SUBJECTIVE AND OBJECTIVE BOX
History of Present Illness:  HPI:  Patient is a 60 yoM with PMH of CAD s/p stent in LAD, leaky aortic valve, uncontrolled bp, thoracic aortic dissection requiring emergent surgery 2010, CVA with left sided sensory loss, colostomy for bowel ischemia s/p reversal, psoriasis on Humira injections presents from home with sob and le edema. Patient states the past several weeks, he has not been taking his medications because he forgot. Patient states the sob has gotten worse over the past few days and also associated with chest pressure. Patient also hasnt seen his cardiologist in several months. Patient worked up in the er and had an elevated bnp and htn urgency and eric. Patient seen at bedside and states feeling a little better.  (02 Nov 2020 14:32)      Current Subjective Assessment: "I'm tired" NAD lying in bed. Reports feeling a little better since Monday but still fairly short of breath with ambulation. He states that if he walks to the bathroom and back, he needs oxygen and is very short of breath. He denies any chest pain or palpitations as well as any fatigue, syncope, abd pain, n/v/d/c. He also reports that he had LE edema when he arrived which he felt was improving. He states he had a prolonged period of time in September and October that he was very sick and short of breath. He has now discovered that is was likely due to Covid-19 as his antibodies are positive now. He reports that he thought he had pneumonia or allergies, which he got last year in September. He also reports changes in urine amount and color for which he drank more water.     Past Medical History  CVA (cerebral vascular accident)    CHF (congestive heart failure)        Past Surgical History  H/O colectomy    Aorta disorder        MEDICATIONS  (STANDING):  aspirin enteric coated 81 milliGRAM(s) Oral daily  atorvastatin 40 milliGRAM(s) Oral at bedtime  carvedilol 25 milliGRAM(s) Oral every 12 hours  enoxaparin Injectable 30 milliGRAM(s) SubCutaneous daily  epoetin yolanda-epbx (RETACRIT) Injectable 97367 Unit(s) SubCutaneous every 7 days  folic acid 1 milliGRAM(s) Oral daily  hydrALAZINE 75 milliGRAM(s) Oral three times a day  influenza   Vaccine 0.5 milliLiter(s) IntraMuscular once  iron sucrose IVPB 200 milliGRAM(s) IV Intermittent every 48 hours  levETIRAcetam 750 milliGRAM(s) Oral two times a day  pantoprazole    Tablet 40 milliGRAM(s) Oral before breakfast  senna 2 Tablet(s) Oral at bedtime  sodium bicarbonate 650 milliGRAM(s) Oral three times a day    MEDICATIONS  (PRN):  albuterol/ipratropium for Nebulization 3 milliLiter(s) Nebulizer every 6 hours PRN Bronchospasm  hydrALAZINE Injectable 10 milliGRAM(s) IV Push every 6 hours PRN for sbp above 160 mmhg  nitroglycerin     SubLingual 0.4 milliGRAM(s) SubLingual every 5 minutes PRN Chest Pain    Allergies: penicillin (Unknown)      SOCIAL HISTORY:  Smoker: [ ] Yes  [x ] No        PACK YEARS:                         WHEN QUIT? former  ETOH use: [ ] Yes  [x ] No              FREQUENCY / QUANTITY:  Ilicit Drug use:  [ ] Yes  [x ] No  QUIT COCAINE TEN YEARS AGO WITH AORTIC DISSECTION.  Occupation: none  Live with: alone, two brothers nearby. Never .  Assist device use: none    PMD: Quinn Yo MD  Referring Cardiologist: Kath    Relevant Family History  FAMILY HISTORY:  FH: hypertension  No family history of heart disease.        Review of Systems  GENERAL:  Fevers[] chills[] sweats[] fatigue[x] weight loss[] weight gain []                                      [ ] NEGATIVE  NEURO:  parathesias[] seizures [remote history]  syncope []  confusion []                                                                [x ] NEGATIVE                 EYES: glasses[]  blurry vision[]  discharge[] pain[] glaucoma []                                                           [ x] NEGATIVE                 ENMT:  difficulty hearing []  vertigo[]  dysphagia[] epistaxis[] recent dental work []                     [x ] NEGATIVE                 CV:  chest pain[] palpitations[] ZUNIGA [x] diaphoresis [] edema[x]                                                           [ ] NEGATIVE                                 RESPIRATORY:  wheezing[] SOB[x] cough [] sputum[x] hemoptysis[]                                                   [ ] NEGATIVE               GI:  nausea[]  vomiting []  diarrhea[] constipation [] melena []                                                          [x ] NEGATIVE            : hematuria[ ]  dysuria[ ] urgency[] incontinence[]                                                                          [ x] NEGATIVE                    MUSKULOSKELETAL:  arthritis[ ]  joint swelling [ ] muscle weakness [ ]             +  Occasional cramping in feet b/l          [ ] NEGATIVE                     SKIN/BREAST:  rash[ ] itching [ ]  hair loss[ ] masses[ ]                                                                          [x ] NEGATIVE                     PSYCH:  dementia [ ] depression [ ] anxiety[x with Panic Attacks ]                                                              [ ] NEGATIVE                        HEME/LYMPH:  bruises easily[ ] enlarged lymph nodes[ ] tender lymph nodes[ ]                           [x ] NEGATIVE                      ENDOCRINE:  cold intolerance[ ] heat intolerance[ ] polydipsia[ ]                                                      [ x] NEGATIVE                        Telemetry: SR    PHYSICAL EXAM  Vital Signs Last 24 Hrs  T(C): 36.7 (05 Nov 2020 05:22), Max: 37.2 (04 Nov 2020 18:10)  T(F): 98 (05 Nov 2020 05:22), Max: 99 (04 Nov 2020 18:10)  HR: 66 (05 Nov 2020 07:55) (66 - 90)  BP: 126/66 (05 Nov 2020 05:22) (112/59 - 149/70)  BP(mean): --  RR: 18 (05 Nov 2020 05:22) (18 - 20)  SpO2: 98% (05 Nov 2020 09:05) (94% - 99%)    General: Obese, well developed, no acute distress.                                                         Neuro: Normal exam oriented to person/place & time with no focal motor or sensory  deficits.                    Eyes: Normal exam of conjunctiva & lids, pupils equally reactive.   ENT: Normal exam of nasal/oral mucosa with absence of cyanosis.   Neck: Normal exam of jugular veins, trachea & thyroid.   Chest: Normal lung exam with good air movement absence of wheezes, rales, or rhonchi:   + systolic murmur                                                   CV:  Auscultation: normal [ x] S3[ ] S4[ ] Irregular [ ] Rub[ ] Clicks[ ]  Murmurs none:[ ]systolic [ x]  diastolic [ ] holosystolic [ ]  Carotids: No Bruits[ x] Other____________ Abdominal Aorta: normal [x ] nonpalpable[ ]                                                                         GI: Normal exam of abdomen, liver & spleen with no noted masses or tenderness.                                                                                              Extremities: Normal no evidence of cyanosis or deformity Edema: none[ ]trace[ ]1+[x L>R ]2+[ ]3+[ ]4+[ ]  Lower Extremity Pulses: Right[+ ] Left[ +]Varicosities[ ]  SKIN : Normal exam to inspection & palpation.                                                           LABS:                        7.8    7.91  )-----------( 212      ( 05 Nov 2020 01:31 )             25.0     11-05    143  |  110<H>  |  61.0<H>  ----------------------------<  111<H>  3.7   |  19.0<L>  |  3.18<H>    Ca    8.3<L>      05 Nov 2020 01:31  Phos  5.3     11-05  Mg     1.9     11-05                  Cardiac Cath: NEEDS    TTE: < from: TTE Echo Complete w/ Contrast w/ Doppler (11.04.20 @ 08:37) >      Summary:   1. Left ventricular ejection fraction, by visual estimation, is 50 to 55%.   2. Mildly decreased global left ventricular systolic function.   3. Severely enlarged left atrium.   4. Moderately increased LV wall thickness.   5. Spectral Doppler shows pseudonormal pattern of left ventricular myocardial filling (Grade II diastolic dysfunction).   6. Mildly reduced RV systolic function.   7. Mildly enlarged right atrium.   8. Mild mitral annular calcification.   9. Moderate to severe mitral valve regurgitation.  10. Thickening of the anterior and posterior mitral valve leaflets.  11. Moderate tricuspid regurgitation.  12. Moderate to severe aortic regurgitation.  13. Mild pulmonic valve regurgitation.  14. Dilatation of the aortic root.  15. Aortic Root measuring 4.2 cm.  16. Estimated pulmonary artery systolic pressure is 43.8 mmHg assuming a right atrial pressure of 8 mmHg, which is consistent with mild pulmonary hypertension.    MD Attila Electronically signed on 11/4/2020 at 1:38:43 PM    < end of copied text >

## 2020-11-05 NOTE — CHART NOTE - NSCHARTNOTEFT_GEN_A_CORE
Patient with two episodes of non-sustained vtach. One episode of 3 beats, one episode of 4 beats.   Patient sleeping, asymptomatic when woken up, VSS.  Electrolytes ordered.  RN advised to notify PA if any changes.  Continue to monitor. Patient with two episodes of non-sustained vtach. One episode of 3 beats, one episode of 4 beats.   Patient sleeping, asymptomatic when woken up, VSS.  Electrolytes ordered and reviewed. Phosphorus likely elevated secondary to SANTOS on CKD IV. Labs otherwise stable. BNP improving.   RN advised to notify PA if any changes.  Continue to monitor.

## 2020-11-05 NOTE — CONSULT NOTE ADULT - ASSESSMENT
60M h/o CAD s/p LAD stent over 10 years ago, aortic dissection s/p emergency repair at [place] in 2010 c/b CVA with left side weakness and ischemic bowel requiring colectomy and colostomy (which was ultimately reversed), HTN, psoriasis on Humira, chronic renal insufficiency (? number per patient but he reports the number went up d/t Humira and was followed by his doctor), who presented to ED with worsening SOB/ZUNIGA. He was found to be anemic and acute on chronic renal insufficiency (creat now ~3). Although he knew he had a leaky valve in the past, he was told that it "couldn't be fixed." Current echo reveals moderate to severe AI and moderate to severe MR and the patient was referred to Dr Butler for surgical evaluation.

## 2020-11-05 NOTE — PROGRESS NOTE ADULT - ASSESSMENT
1) MARTIN on CKD  Last known Scr 1.2 in 10/19; Renal US reviewed-normal study  Martin in setting of HTN urgency; Scr stable now  Agree with holding diuresis    2) CHFpEF     3) Metabolic Acidosis- improving serum CO2, continue sodium bicarb    4) Anemia  Obtain iron studies  Continue REINALDO       1) MARTIN on CKD  Last known Scr 1.2 in 10/19; Renal US reviewed-normal study  Martin in setting of HTN urgency; Scr stable now  Agree with holding diuresis    2) CHFpEF - compensated    3) Metabolic Acidosis- improving serum CO2, continue sodium bicarb    4) Anemia  Obtain iron studies  Continue REINALDO

## 2020-11-05 NOTE — PROGRESS NOTE ADULT - PROBLEM SELECTOR PLAN 1
Patient with anemia, guiac negative stool  will do colonscopy when optimized from cardiac standpoint. Having V tach and breathing is not back to baseline

## 2020-11-05 NOTE — CONSULT NOTE ADULT - PROBLEM SELECTOR RECOMMENDATION 2
Diuretics only given x 1 due to renal failure but may need HD for fluid removal.  CXR  Recommend CT Chest to eval anatomy

## 2020-11-05 NOTE — CHART NOTE - NSCHARTNOTEFT_GEN_A_CORE
CTS PA Addendum    After discussion with Dr Leahy, decided that patient will require dialysis initiated prior to TAVR. Plan to do CTA in the AM, then dialysis in the afternoon. Discussed with Dr Morales.    Plan for LEFT and RIGHT heart cath as well.

## 2020-11-05 NOTE — PROGRESS NOTE ADULT - ASSESSMENT
61 y/o M with PMH of CAD s/p stent in LAD, leaky aortic valve, uncontrolled bp, thoracic aortic dissection requiring emergent surgery, CVA with left sided sensory loss, colostomy for bowel ischemia s/p reversal, psoriasis on Humira injections presents from home with shortness of breath.     Shortness of breath secondary Acute on chronic diastolic CHF exacerbation vs symptomatic anemia  - Cardiology consulted, recommendations appreciated  - c/w ASA 81mg daily  - c/w Coreg 25mg q12h  - c/w Hydralazine 75mg TID  - c/w Lipitor 40mg nightly  - echocardiogram performed: EF 50 to 55%, moderate to severe mitral regurgitation, estimated PASP 43.8 consistent with mild pulmonary hypertension  - repeat pro BNP  - Ferritin 230  - total iron 29  - TIBC 332  - Per Cardiology patient no longer with active cardiac condition and no cardiac contraindication to proceeding with EGD/Colonoscopy as indicated  - GI recommendations appreciated    SANTOS  - renal ultrasound showing no hydronephrosis  - Nephrology consulted, recommendations appreciated  - c/w Venofer   - hold Ace inhibitor  - monitor BMP    h/o CVA  - c/w Keppra 750mg BID  - c/w ASA 81mg daily  - c/w Lipitor 40mg nightly    Elevated troponin likely secondary to SANTOS  - Cardiology recommendations appreciated    DVT ppx  - Lovenox SQ    Disposition: currently acute

## 2020-11-06 DIAGNOSIS — D50.8 OTHER IRON DEFICIENCY ANEMIAS: ICD-10-CM

## 2020-11-06 LAB
ANION GAP SERPL CALC-SCNC: 15 MMOL/L — SIGNIFICANT CHANGE UP (ref 5–17)
BUN SERPL-MCNC: 64 MG/DL — HIGH (ref 8–20)
CALCIUM SERPL-MCNC: 7.9 MG/DL — LOW (ref 8.6–10.2)
CHLORIDE SERPL-SCNC: 111 MMOL/L — HIGH (ref 98–107)
CO2 SERPL-SCNC: 13 MMOL/L — LOW (ref 22–29)
CREAT SERPL-MCNC: 3.06 MG/DL — HIGH (ref 0.5–1.3)
GLUCOSE SERPL-MCNC: 104 MG/DL — HIGH (ref 70–99)
HCT VFR BLD CALC: 24.7 % — LOW (ref 39–50)
HGB BLD-MCNC: 7.3 G/DL — LOW (ref 13–17)
MCHC RBC-ENTMCNC: 28.6 PG — SIGNIFICANT CHANGE UP (ref 27–34)
MCHC RBC-ENTMCNC: 29.6 GM/DL — LOW (ref 32–36)
MCV RBC AUTO: 96.9 FL — SIGNIFICANT CHANGE UP (ref 80–100)
MRSA PCR RESULT.: SIGNIFICANT CHANGE UP
PLATELET # BLD AUTO: 184 K/UL — SIGNIFICANT CHANGE UP (ref 150–400)
POTASSIUM SERPL-MCNC: 3.8 MMOL/L — SIGNIFICANT CHANGE UP (ref 3.5–5.3)
POTASSIUM SERPL-SCNC: 3.8 MMOL/L — SIGNIFICANT CHANGE UP (ref 3.5–5.3)
PREALB SERPL-MCNC: 18 MG/DL — SIGNIFICANT CHANGE UP (ref 18–38)
RBC # BLD: 2.55 M/UL — LOW (ref 4.2–5.8)
RBC # FLD: 15.4 % — HIGH (ref 10.3–14.5)
S AUREUS DNA NOSE QL NAA+PROBE: SIGNIFICANT CHANGE UP
SODIUM SERPL-SCNC: 139 MMOL/L — SIGNIFICANT CHANGE UP (ref 135–145)
WBC # BLD: 7.46 K/UL — SIGNIFICANT CHANGE UP (ref 3.8–10.5)
WBC # FLD AUTO: 7.46 K/UL — SIGNIFICANT CHANGE UP (ref 3.8–10.5)

## 2020-11-06 PROCEDURE — 71045 X-RAY EXAM CHEST 1 VIEW: CPT | Mod: 26

## 2020-11-06 PROCEDURE — 99233 SBSQ HOSP IP/OBS HIGH 50: CPT

## 2020-11-06 PROCEDURE — 71275 CT ANGIOGRAPHY CHEST: CPT | Mod: 26

## 2020-11-06 PROCEDURE — 90937 HEMODIALYSIS REPEATED EVAL: CPT

## 2020-11-06 RX ORDER — ACETAMINOPHEN 500 MG
650 TABLET ORAL ONCE
Refills: 0 | Status: COMPLETED | OUTPATIENT
Start: 2020-11-06 | End: 2020-11-06

## 2020-11-06 RX ADMIN — Medication 1 MILLIGRAM(S): at 14:34

## 2020-11-06 RX ADMIN — IRON SUCROSE 110 MILLIGRAM(S): 20 INJECTION, SOLUTION INTRAVENOUS at 18:12

## 2020-11-06 RX ADMIN — Medication 75 MILLIGRAM(S): at 05:02

## 2020-11-06 RX ADMIN — CARVEDILOL PHOSPHATE 25 MILLIGRAM(S): 80 CAPSULE, EXTENDED RELEASE ORAL at 21:02

## 2020-11-06 RX ADMIN — Medication 650 MILLIGRAM(S): at 05:02

## 2020-11-06 RX ADMIN — CARVEDILOL PHOSPHATE 25 MILLIGRAM(S): 80 CAPSULE, EXTENDED RELEASE ORAL at 05:02

## 2020-11-06 RX ADMIN — Medication 650 MILLIGRAM(S): at 14:34

## 2020-11-06 RX ADMIN — Medication 81 MILLIGRAM(S): at 21:00

## 2020-11-06 RX ADMIN — LEVETIRACETAM 750 MILLIGRAM(S): 250 TABLET, FILM COATED ORAL at 21:01

## 2020-11-06 RX ADMIN — ATORVASTATIN CALCIUM 40 MILLIGRAM(S): 80 TABLET, FILM COATED ORAL at 21:00

## 2020-11-06 RX ADMIN — Medication 650 MILLIGRAM(S): at 21:01

## 2020-11-06 RX ADMIN — Medication 75 MILLIGRAM(S): at 21:01

## 2020-11-06 RX ADMIN — PANTOPRAZOLE SODIUM 40 MILLIGRAM(S): 20 TABLET, DELAYED RELEASE ORAL at 05:02

## 2020-11-06 RX ADMIN — ENOXAPARIN SODIUM 30 MILLIGRAM(S): 100 INJECTION SUBCUTANEOUS at 21:00

## 2020-11-06 RX ADMIN — Medication 75 MILLIGRAM(S): at 14:34

## 2020-11-06 RX ADMIN — LEVETIRACETAM 750 MILLIGRAM(S): 250 TABLET, FILM COATED ORAL at 05:02

## 2020-11-06 NOTE — PROGRESS NOTE ADULT - ASSESSMENT
1) CKD V  2) MBD of renal dx  3) Anemia of renal dx  4) Vol HTN    Held long discussion with pt regarding starting dialysis before cardiac surgery to optimize patient;  Pt agreeable and understands need;  Alycia being placed today by vascular surgery  Consented for HD  HD tonight; will dialyze tomorrow again    evan FOUNTAIN and Dr Lopez

## 2020-11-06 NOTE — PROGRESS NOTE ADULT - SUBJECTIVE AND OBJECTIVE BOX
[facility] DIVISION OF KIDNEY DISEASES AND HYPERTENSION -- FOLLOW UP NOTE  --------------------------------------------------------------------------------  Chief Complaint:  CKD V    24 hour events/subjective:  Pt seen/examined; worsening acidosis  Cardiac surgery planned for next week;      PAST HISTORY  --------------------------------------------------------------------------------  No significant changes to PMH, PSH, FHx, SHx, unless otherwise noted    ALLERGIES & MEDICATIONS  --------------------------------------------------------------------------------  Allergies    penicillin (Unknown)    Intolerances      Standing Inpatient Medications  aspirin enteric coated 81 milliGRAM(s) Oral daily  atorvastatin 40 milliGRAM(s) Oral at bedtime  carvedilol 25 milliGRAM(s) Oral every 12 hours  enoxaparin Injectable 30 milliGRAM(s) SubCutaneous daily  epoetin yolanda-epbx (RETACRIT) Injectable 29962 Unit(s) SubCutaneous every 7 days  folic acid 1 milliGRAM(s) Oral daily  hydrALAZINE 75 milliGRAM(s) Oral three times a day  influenza   Vaccine 0.5 milliLiter(s) IntraMuscular once  iron sucrose IVPB 200 milliGRAM(s) IV Intermittent every 48 hours  levETIRAcetam 750 milliGRAM(s) Oral two times a day  pantoprazole    Tablet 40 milliGRAM(s) Oral before breakfast  senna 2 Tablet(s) Oral at bedtime  sodium bicarbonate 650 milliGRAM(s) Oral three times a day    PRN Inpatient Medications  albuterol/ipratropium for Nebulization 3 milliLiter(s) Nebulizer every 6 hours PRN  hydrALAZINE Injectable 10 milliGRAM(s) IV Push every 6 hours PRN  nitroglycerin     SubLingual 0.4 milliGRAM(s) SubLingual every 5 minutes PRN      REVIEW OF SYSTEMS  --------------------------------------------------------------------------------  Gen: No weight changes, fatigue, fevers/chills, weakness  Skin: No rashes  Head/Eyes/Ears/Mouth: No headache; Normal hearing; Normal vision w/o blurriness; No sinus pain/discomfort, sore throat  Respiratory: No dyspnea, cough, wheezing, hemoptysis  CV: No chest pain, PND, orthopnea  GI: No abdominal pain, diarrhea, constipation, nausea, vomiting, melena, hematochezia  : No increased frequency, dysuria, hematuria, nocturia  MSK: No joint pain/swelling; no back pain; no edema  Neuro: No dizziness/lightheadedness, weakness, seizures, numbness, tingling  Heme: No easy bruising or bleeding  Endo: No heat/cold intolerance  Psych: No significant nervousness, anxiety, stress, depression    All other systems were reviewed and are negative, except as noted.    VITALS/PHYSICAL EXAM  --------------------------------------------------------------------------------  T(C): 36.4 (11-06-20 @ 10:43), Max: 37.3 (11-05-20 @ 17:48)  HR: 68 (11-06-20 @ 10:43) (68 - 82)  BP: 125/66 (11-06-20 @ 10:43) (125/66 - 130/64)  RR: 15 (11-06-20 @ 10:43) (15 - 19)  SpO2: 96% (11-06-20 @ 10:43) (96% - 98%)  Wt(kg): --        Physical Exam:  	Gen: NAD,    	HEENT: PERRL, supple neck, clear oropharynx  	Pulm: CTA B/L  	CV: RRR, S1S2; no rub  	Back: No spinal or CVA tenderness; no sacral edema  	Abd: +BS, soft, nontender/nondistended  	: No suprapubic tenderness  	UE: Warm, FROM, no clubbing, intact strength; no edema; no asterixis  	LE: Warm, FROM, no clubbing, intact strength; ++ edema  	Neuro: No focal deficits, intact gait  	Psych: Normal affect and mood  	Skin: Warm, without rashes  	Vascular access:    LABS/STUDIES  --------------------------------------------------------------------------------              7.3    7.46  >-----------<  184      [11-06-20 @ 07:18]              24.7     139  |  111  |  64.0  ----------------------------<  104      [11-06-20 @ 07:18]  3.8   |  13.0  |  3.06        Ca     7.9     [11-06-20 @ 07:18]      Mg     1.9     [11-05-20 @ 01:31]      Phos  5.3     [11-05-20 @ 01:31]            Creatinine Trend:  SCr 3.06 [11-06 @ 07:18]  SCr 3.18 [11-05 @ 01:31]  SCr 3.30 [11-04 @ 03:15]  SCr 2.88 [11-02 @ 09:21]        Iron 29, TIBC 332, %sat 9      [11-03-20 @ 08:18]  Ferritin 230      [11-03-20 @ 08:18]  TSH 1.95      [11-03-20 @ 08:18]  Lipid: chol 154, , HDL 44, LDL --      [11-03-20 @ 08:18]

## 2020-11-06 NOTE — PROGRESS NOTE ADULT - SUBJECTIVE AND OBJECTIVE BOX
Pt seen and examined f/u for TARYN.    This AM he has no new complaints. No abdominal pain, No rectal bleeding. H/H is stable. He is now being considered for valve surgery, ? TAVR but will be started on HD prior.    REVIEW OF SYSTEMS:    CONSTITUTIONAL: No fever, weight loss, or fatigue  EYES: No eye pain, visual disturbances, or discharge  ENMT:  No difficulty hearing, tinnitus, vertigo; No sinus or throat pain  RESPIRATORY: No cough, wheezing, chills or hemoptysis; No shortness of breath  CARDIOVASCULAR: No chest pain, palpitations, dizziness, or leg swelling  GASTROINTESTINAL: No abdominal or epigastric pain. No nausea, vomiting, or hematemesis; No diarrhea or constipation. No melena or hematochezia.    MEDICATIONS:  MEDICATIONS  (STANDING):  aspirin enteric coated 81 milliGRAM(s) Oral daily  atorvastatin 40 milliGRAM(s) Oral at bedtime  carvedilol 25 milliGRAM(s) Oral every 12 hours  enoxaparin Injectable 30 milliGRAM(s) SubCutaneous daily  epoetin yolanda-epbx (RETACRIT) Injectable 05739 Unit(s) SubCutaneous every 7 days  folic acid 1 milliGRAM(s) Oral daily  hydrALAZINE 75 milliGRAM(s) Oral three times a day  influenza   Vaccine 0.5 milliLiter(s) IntraMuscular once  iron sucrose IVPB 200 milliGRAM(s) IV Intermittent every 48 hours  levETIRAcetam 750 milliGRAM(s) Oral two times a day  pantoprazole    Tablet 40 milliGRAM(s) Oral before breakfast  senna 2 Tablet(s) Oral at bedtime  sodium bicarbonate 650 milliGRAM(s) Oral three times a day    MEDICATIONS  (PRN):  albuterol/ipratropium for Nebulization 3 milliLiter(s) Nebulizer every 6 hours PRN Bronchospasm  hydrALAZINE Injectable 10 milliGRAM(s) IV Push every 6 hours PRN for sbp above 160 mmhg  nitroglycerin     SubLingual 0.4 milliGRAM(s) SubLingual every 5 minutes PRN Chest Pain      Allergies    penicillin (Unknown)    Intolerances        Vital Signs Last 24 Hrs  T(C): 36.4 (06 Nov 2020 10:43), Max: 37.3 (05 Nov 2020 17:48)  T(F): 97.6 (06 Nov 2020 10:43), Max: 99.1 (05 Nov 2020 17:48)  HR: 68 (06 Nov 2020 10:43) (68 - 82)  BP: 125/66 (06 Nov 2020 10:43) (125/66 - 130/64)  BP(mean): --  RR: 15 (06 Nov 2020 10:43) (15 - 19)  SpO2: 96% (06 Nov 2020 10:43) (96% - 98%)      PHYSICAL EXAM:    General: Well developed; well nourished; in no acute distress  HEENT: MMM, conjunctiva and sclera clear  Gastrointestinal:Abdomen: Soft non-tender non-distended; Normal bowel sounds; No hepatosplenomegaly  Extremities: no cyanosis, clubbing or edema.  Skin: Warm and dry. No obvious rash    LABS:  ABG - ( 04 Nov 2020 15:14 )  pH, Arterial: 7.35  pH, Blood: x     /  pCO2: 36    /  pO2: 119   / HCO3: 20    / Base Excess: -4.8  /  SaO2: 99                  CBC Full  -  ( 06 Nov 2020 07:18 )  WBC Count : 7.46 K/uL  RBC Count : 2.55 M/uL  Hemoglobin : 7.3 g/dL  Hematocrit : 24.7 %  Platelet Count - Automated : 184 K/uL  Mean Cell Volume : 96.9 fl  Mean Cell Hemoglobin : 28.6 pg  Mean Cell Hemoglobin Concentration : 29.6 gm/dL  Auto Neutrophil # : x  Auto Lymphocyte # : x  Auto Monocyte # : x  Auto Eosinophil # : x  Auto Basophil # : x  Auto Neutrophil % : x  Auto Lymphocyte % : x  Auto Monocyte % : x  Auto Eosinophil % : x  Auto Basophil % : x    11-06    139  |  111<H>  |  64.0<H>  ----------------------------<  104<H>  3.8   |  13.0<L>  |  3.06<H>    Ca    7.9<L>      06 Nov 2020 07:18  Phos  5.3     11-05  Mg     1.9     11-05

## 2020-11-06 NOTE — PROGRESS NOTE ADULT - PROBLEM SELECTOR PLAN 1
will plan on EGD and flex sig ( has had subtotal colectomy) early next week. Cardiac clearance noted.

## 2020-11-06 NOTE — PROGRESS NOTE ADULT - SUBJECTIVE AND OBJECTIVE BOX
[facility], [facility]                                          [address] , [address], [place] [address]                                               PHONE: [phone]    FAX: [phone] [address] [facility], [address], [place] [address]                                       PHONE: [phone]    FAX: [phone]  *******************************************************************************    Overnight events/Subjective Assessment: No CP, palpitations or SOB at rest.  Continues to have ZUNIGA (significantly improved from baseline).  He was seen by CTS yesterday and likely will require valve surgery.    INTERPRETATION OF TELEMETRY (personally reviewed): SR 70-80s, PVCs    penicillin (Unknown)    MEDICATIONS  (STANDING):  aspirin enteric coated 81 milliGRAM(s) Oral daily  atorvastatin 40 milliGRAM(s) Oral at bedtime  carvedilol 25 milliGRAM(s) Oral every 12 hours  enoxaparin Injectable 30 milliGRAM(s) SubCutaneous daily  epoetin yolanda-epbx (RETACRIT) Injectable 81169 Unit(s) SubCutaneous every 7 days  folic acid 1 milliGRAM(s) Oral daily  hydrALAZINE 75 milliGRAM(s) Oral three times a day  influenza   Vaccine 0.5 milliLiter(s) IntraMuscular once  iron sucrose IVPB 200 milliGRAM(s) IV Intermittent every 48 hours  levETIRAcetam 750 milliGRAM(s) Oral two times a day  pantoprazole    Tablet 40 milliGRAM(s) Oral before breakfast  senna 2 Tablet(s) Oral at bedtime  sodium bicarbonate 650 milliGRAM(s) Oral three times a day    MEDICATIONS  (PRN):  albuterol/ipratropium for Nebulization 3 milliLiter(s) Nebulizer every 6 hours PRN Bronchospasm  hydrALAZINE Injectable 10 milliGRAM(s) IV Push every 6 hours PRN for sbp above 160 mmhg  nitroglycerin     SubLingual 0.4 milliGRAM(s) SubLingual every 5 minutes PRN Chest Pain      Vital Signs Last 24 Hrs  T(C): 36.6 (06 Nov 2020 05:10), Max: 37.3 (05 Nov 2020 17:48)  T(F): 97.8 (06 Nov 2020 05:10), Max: 99.1 (05 Nov 2020 17:48)  HR: 75 (06 Nov 2020 05:10) (74 - 82)  BP: 129/58 (06 Nov 2020 05:10) (128/65 - 130/64)  BP(mean): --  RR: 19 (06 Nov 2020 05:10) (19 - 19)  SpO2: 96% (06 Nov 2020 05:10) (96% - 98%)    I&O's Detail    I&O's Summary          PHYSICAL EXAM:  General: Appears well developed, well nourished, no acute distress, laying flat  HEENT: Head: normocephalic, atraumatic  Eyes: Pupils equal and reactive  Neck: Supple, no carotid bruit, no JVD, no HJR  CARDIOVASCULAR: Normal S1 and S2, 2/6 systolic murmur at base and apex  LUNGS: mildly adventitious bilat L > R, no rales, rhonchi or wheeze  ABDOMEN: Soft, nontender, non-distended, positive bowel sounds, no mass or bruit  EXTREMITIES: 1+ pedal edema (improved)  SKIN: Warm and dry with normal turgor  NEURO: Alert & oriented x 3, grossly intact  PSYCH: normal mood and affect      LABS:                        7.3    7.46  )-----------( 184      ( 06 Nov 2020 07:18 )             24.7     11-06    139  |  111<H>  |  64.0<H>  ----------------------------<  104<H>  3.8   |  13.0<L>  |  3.06<H>    Ca    7.9<L>      06 Nov 2020 07:18  Phos  5.3     11-05  Mg     1.9     11-05            Serum Pro-Brain Natriuretic Peptide: 58289 pg/mL (11-05 @ 01:31)  Serum Pro-Brain Natriuretic Peptide: 24308 pg/mL (11-02 @ 09:21)  serum  Lipids:     Thyroid Stimulating Hormone, Serum: 1.95 uIU/mL (11-03 @ 08:18)      RADIOLOGY & ADDITIONAL STUDIES:  < from: US Duplex Carotid Arteries Complete, Bilateral (11.05.20 @ 16:51) >  IMPRESSION:    Moderate plaque in the right carotid bulb with elevated peak systolic and end-diastolic velocities which overall are suggestive of a 50-69% stenosis as described above, however given the peak systolic velocity is suggestive of a greater than 70% stenosis, aCTA of the neck is recommended for further evaluation.    Mild to moderate plaque in the left carotid bulb with less than 50% left internal carotid artery stenosis.      < end of copied text >    ECHO:  < from: TTE Echo Complete w/ Contrast w/ Doppler (11.04.20 @ 08:37) >    PHYSICIAN INTERPRETATION:  Left Ventricle: Left ventricular wall thickness is moderately increased.  Global LV systolic function was mildly decreased. Left ventricular ejection fraction, by visual estimation, is 50 to 55%. Spectral Doppler shows pseudonormal pattern of left ventricular myocardial filling (Grade II diastolic dysfunction).  Right Ventricle: The right ventricular size is normal. RV systolic function is mildly reduced.  Left Atrium: Severely enlarged left atrium.  Right Atrium: Mildly enlarged right atrium.  Pericardium: Trivial pericardial effusion is present. The pericardial effusion is posterior to the left ventricle.  Mitral Valve: Thickening of the anterior and posterior mitral valve leaflets. There is mild mitral annular calcification. Moderate to severe mitral valve regurgitation is seen.  Tricuspid Valve: Moderate tricuspid regurgitation is visualized. Estimated pulmonary artery systolic pressure is 43.8 mmHg assuming a right atrial pressure of 8 mmHg, which is consistent with mild pulmonary hypertension.  Aortic Valve: Peak transaortic gradient equals 4.4 mmHg, mean transaortic gradient equals 2.3 mmHg, the calculated aortic valve area equals 3.18 cm² by the continuity equation consistent with normally opening aortic valve. Moderate to severe aortic valve regurgitation is seen.  Pulmonic Valve: Mild pulmonic valve regurgitation.  Aorta: There is dilatation of the aortic root. Aortic Root measuring 4.2 cm.  Pulmonary Artery: The main pulmonary artery is normal in size.  Venous: The inferior vena cava was dilated, with respiratory size variation greater than 50%.      Summary:   1. Left ventricular ejection fraction, by visual estimation, is 50 to 55%.   2. Mildly decreased global left ventricular systolic function.   3. Severely enlarged left atrium.   4. Moderately increased LV wall thickness.   5. Spectral Doppler shows pseudonormal pattern of left ventricular myocardial filling (Grade II diastolic dysfunction).   6. Mildly reduced RV systolic function.   7. Mildly enlarged right atrium.   8. Mild mitral annular calcification.   9. Moderate to severe mitral valve regurgitation.  10. Thickening of the anterior and posterior mitral valve leaflets.  11. Moderate tricuspid regurgitation.  12. Moderate to severe aortic regurgitation.  13. Mild pulmonic valve regurgitation.  14. Dilatation of the aortic root.  15. Aortic Root measuring 4.2 cm.  16. Estimated pulmonary artery systolic pressure is 43.8 mmHg assuming a right atrial pressure of 8 mmHg, which is consistent with mild pulmonary hypertension.      < end of copied text >        ASSESSMENT AND PLAN:  In summary, JONATHAN GIBSON is a 60y man with HTN, HLD, CAD s/p remote stent (most recent nuclear stress test 11/2019 showing infarct without ischemia), ascending aortic dissection s/p repair (2001), chronic diastolic HFpEF, moderate valvular heart disease including moderate aortic and mitral regurgitation, admitted for evaluation of shortness of breath, found to have new anemia and SANTOS in the setting of markedly elevated BP, incidentally found to have mildly elevated troponin.      - The patient has been chest pain free since admission.  Mildly elevated initial troponin is nonspecific, especially in the setting of SANTOS.  Nothing to suggest acute MI.  Would repeat ECG should symptoms recur.   - Echocardiogram 11/4/20 EF 50-55%, grade II diastolic dysfunction, mildly reduced RV systolic function, severe LAE, mild KOLTON, mod-severe MR and AI, mod TR, dilated aortic root @ 4.2cm  - Mild HF symptoms (acute on chronic diastolic/valvular HF) on admission improving following IV lasix.  Renal function worsened with diuresis and is improving with discontinuation of diuretic.  BNP improved, but still elevated.  Will continue to monitor symptoms.  If worsening symptoms will need additional diuresis or possibly fluid removal with HD  - Rhythm/hemodynamics stable: BP markedly elevated on admission now improved following restarting of PO regimen.  Continue Coreg 25mg BID and Hydralazine 75mg TID; titrate prn.  Avoid nephrotoxic agents  - The patient has been evaluated by Dr. Butler of [facility].  He will likely benefit from valve surgery.  He will need CTA of the chest and cardiac cath prior to surgery.  In setting of SANTOS/CKD, he will likely have worsening renal function with contrast studies and OHS.  He will likely require initiation of HD, which will hopefully be temporary, but may become permanent.  Further planning/timing as per Dr. Butler.  - Acute anemia, worsening: symptoms of progressive shortness of breath are at least in part related to anemia which continues to progress.  He is laying flat and has no evidence of decompensated HF.  PVCs and short 3-4 beats of NSVT do not constitute significant arrhythmia.  He has regurgitant valvular heart disease which is not a contraindication to procedures/surgery.   There is nothing to suggest active ischemia.  Therefore there is no absolute cardiac contraindication to proceeding with EGD/colonoscopy as indicated, particularly in light of progressive symptomatic anemia.  Given his comorbidities, he does remain at increased risk of CV events with any procedure/surgery, however. As open heart surgery is being considered, this will likely need to be completed prior to surgery.  - ASA 81mg daily needs to be continued in an uninterrupted fashion given CAD with prior coronary stents.  - HLD: continue lipitor 40   - Carotid US 11/5/20 Moderate, possibly > 70% VANESSA stenosis, < 50% LICA stenosis.  Continue ASA and statin.  Consider CTA of the carotids.  - Keep K > 4, Mg > 2  - discussed with patient at length    Celso Stockton MD

## 2020-11-06 NOTE — PROGRESS NOTE ADULT - ASSESSMENT
59 y/o M with PMH of CAD s/p stent in LAD, leaky aortic valve, uncontrolled bp, thoracic aortic dissection requiring emergent surgery, CVA with left sided sensory loss, colostomy for bowel ischemia s/p reversal, psoriasis on Humira injections presents from home with shortness of breath.     Shortness of breath secondary Acute on chronic diastolic CHF exacerbation vs symptomatic anemia  - Cardiology consulted, recommendations appreciated  - c/w ASA 81mg daily  - c/w Coreg 25mg q12h  - c/w Hydralazine 75mg TID  - c/w Lipitor 40mg nightly  - echocardiogram performed: EF 50 to 55%, moderate to severe mitral regurgitation, estimated PASP 43.8 consistent with mild pulmonary hypertension  - repeat pro BNP  - Ferritin 230  - total iron 29  - TIBC 332  - Per Cardiology patient no longer with active cardiac condition and no cardiac contraindication to proceeding with EGD/Colonoscopy as indicated  - GI recommendations appreciated  - Patient evaluated by CT Surgery for valve surgery. Patient will require CTA of the chest and cardiac cath prior to surgery  - RHC and LHC today  - CTA chest today  - In the setting of patient's SANTOS/CKD patient will require dialysis after procedure and scan  - HD today    SANTOS  - renal ultrasound showing no hydronephrosis  - Nephrology consulted, recommendations appreciated  - c/w Venofer   - hold Ace inhibitor  - monitor BMP    h/o CVA  - c/w Keppra 750mg BID  - c/w ASA 81mg daily  - c/w Lipitor 40mg nightly    Elevated troponin likely secondary to SANTOS  - Cardiology recommendations appreciated    DVT ppx  - Lovenox SQ    Disposition: currently acute

## 2020-11-06 NOTE — CONSULT NOTE ADULT - SUBJECTIVE AND OBJECTIVE BOX
Vascular Surgery Consult    Consulting attending: Dr. Jo    Consult for dialysis catheter placement. Patient found with valvular heart disease and is being optimized for potential cardiac intervention. Optimization to include hemodialysis per nephro for treatment of SANTOS on CKD. Need access for HD. Patient seen and examined at bedside. Has no complaints at this time. Understands need for dialysis catheter and is in agreement. Consented for bedside hemodialysis catheter insertion.       HPI:  Patient is a 60 yoM with PMH of CAD s/p stent in LAD, leaky aortic valve, uncontrolled bp, thoracic aortic dissection requiring emergent surgery, CVA with left sided sensory loss, colostomy for bowel ischemia s/p reversal, psoriasis on Humira injections presents from home with sob and le edema. Patient states the past several weeks, he has not been taking his medications because he forgot. Patient states the sob has gotten worse over the past few days and also associated with chest pressure. Patient also hasnt seen his cardiologist in several months. Patient worked up in the er and had an elevated bnp and htn urgency and santos. Patient seen at bedside and states feeling a little better.  (02 Nov 2020 14:32)        PAST MEDICAL HISTORY:  CVA (cerebral vascular accident)    CHF (congestive heart failure)          PAST SURGICAL HISTORY:  H/O colectomy    Aorta disorder          MEDICATIONS:  albuterol/ipratropium for Nebulization 3 milliLiter(s) Nebulizer every 6 hours PRN  aspirin enteric coated 81 milliGRAM(s) Oral daily  atorvastatin 40 milliGRAM(s) Oral at bedtime  carvedilol 25 milliGRAM(s) Oral every 12 hours  enoxaparin Injectable 30 milliGRAM(s) SubCutaneous daily  epoetin yolanda-epbx (RETACRIT) Injectable 55172 Unit(s) SubCutaneous every 7 days  folic acid 1 milliGRAM(s) Oral daily  hydrALAZINE 75 milliGRAM(s) Oral three times a day  hydrALAZINE Injectable 10 milliGRAM(s) IV Push every 6 hours PRN  influenza   Vaccine 0.5 milliLiter(s) IntraMuscular once  iron sucrose IVPB 200 milliGRAM(s) IV Intermittent every 48 hours  levETIRAcetam 750 milliGRAM(s) Oral two times a day  nitroglycerin     SubLingual 0.4 milliGRAM(s) SubLingual every 5 minutes PRN  pantoprazole    Tablet 40 milliGRAM(s) Oral before breakfast  senna 2 Tablet(s) Oral at bedtime  sodium bicarbonate 650 milliGRAM(s) Oral three times a day        ALLERGIES:  penicillin (Unknown)        VITALS & I/Os:  Vital Signs Last 24 Hrs  T(C): 36.7 (06 Nov 2020 16:15), Max: 36.7 (05 Nov 2020 22:22)  T(F): 98.1 (06 Nov 2020 16:15), Max: 98.1 (05 Nov 2020 22:22)  HR: 75 (06 Nov 2020 16:15) (68 - 75)  BP: 150/64 (06 Nov 2020 16:15) (125/66 - 150/64)  BP(mean): --  RR: 18 (06 Nov 2020 16:15) (15 - 19)  SpO2: 98% (06 Nov 2020 16:15) (96% - 98%)    I&O's Summary        PHYSICAL EXAM:  General: Pleasant gentleman. No acute distress, no focal deficits.  Respiratory: Nonlabored, no use of accessory muscles  Cardiovascular: RRR  Abdominal: Soft, nondistended, nontender. No rebound or guarding. No organomegaly, no palpable mass.  Extremities: Warm and well perfused.         LABS:                        7.3    7.46  )-----------( 184      ( 06 Nov 2020 07:18 )             24.7     11-06    139  |  111<H>  |  64.0<H>  ----------------------------<  104<H>  3.8   |  13.0<L>  |  3.06<H>    Ca    7.9<L>      06 Nov 2020 07:18  Phos  5.3     11-05  Mg     1.9     11-05

## 2020-11-06 NOTE — PROGRESS NOTE ADULT - SUBJECTIVE AND OBJECTIVE BOX
Chief complaint: Shortness of breath    Patient seen and examined at bedside. No acute overnight events reported. Patient states he is doing well and currently has no shortness of breath. Denies headache, fever, chills, cough, chest pain, shortness of breath, nausea, vomiting, abdominal pain, diarrhea or constipation.     Vital Signs Last 24 Hrs  T(F): 97.6 (06 Nov 2020 10:43), Max: 99.1 (05 Nov 2020 17:48)  HR: 68 (06 Nov 2020 10:43) (68 - 82)  BP: 125/66 (06 Nov 2020 10:43) (125/66 - 130/64)  RR: 15 (06 Nov 2020 10:43) (15 - 19)  SpO2: 96% (06 Nov 2020 10:43) (96% - 98%)    Physical Exam:  Constitutional: alert and oriented, in no acute distress   Neck: Soft and supple, No LAD, No JVD  Respiratory: Clear to auscultation bilaterally, no wheezes or crackles  Cardiovascular: Regular rate and rhythm no murmurs, gallops, rubs  Gastrointestinal: Soft, non-tender to palpation, +bs  Vascular: 2+ peripheral pulses  Neurological: A/O x 3, no focal neurological deficits  Musculoskeletal: trace bilateral lower extremity edema    Labs:                        7.3    7.46  )-----------( 184      ( 06 Nov 2020 07:18 )             24.7   11-06    139  |  111<H>  |  64.0<H>  ----------------------------<  104<H>  3.8   |  13.0<L>  |  3.06<H>    Ca    7.9<L>      06 Nov 2020 07:18  Phos  5.3     11-05  Mg     1.9     11-05

## 2020-11-06 NOTE — PROCEDURE NOTE - NSICDXPROCEDURE_GEN_ALL_CORE_FT
PROCEDURES:  Insertion, catheter, dialysis, temporary, with imaging guidance 06-Nov-2020 18:21:44  Edmond Lee

## 2020-11-07 LAB
ANION GAP SERPL CALC-SCNC: 15 MMOL/L — SIGNIFICANT CHANGE UP (ref 5–17)
BLD GP AB SCN SERPL QL: SIGNIFICANT CHANGE UP
BUN SERPL-MCNC: 50 MG/DL — HIGH (ref 8–20)
CALCIUM SERPL-MCNC: 8.1 MG/DL — LOW (ref 8.6–10.2)
CHLORIDE SERPL-SCNC: 109 MMOL/L — HIGH (ref 98–107)
CO2 SERPL-SCNC: 20 MMOL/L — LOW (ref 22–29)
CREAT SERPL-MCNC: 2.82 MG/DL — HIGH (ref 0.5–1.3)
GLUCOSE SERPL-MCNC: 90 MG/DL — SIGNIFICANT CHANGE UP (ref 70–99)
HBV CORE AB SER-ACNC: SIGNIFICANT CHANGE UP
HBV CORE IGM SER-ACNC: SIGNIFICANT CHANGE UP
HBV SURFACE AB SER-ACNC: <3 MIU/ML — LOW
HBV SURFACE AG SER-ACNC: SIGNIFICANT CHANGE UP
HCT VFR BLD CALC: 23.9 % — LOW (ref 39–50)
HCV AB S/CO SERPL IA: 0.07 S/CO — SIGNIFICANT CHANGE UP (ref 0–0.99)
HCV AB SERPL-IMP: SIGNIFICANT CHANGE UP
HGB BLD-MCNC: 7.4 G/DL — LOW (ref 13–17)
MCHC RBC-ENTMCNC: 29.1 PG — SIGNIFICANT CHANGE UP (ref 27–34)
MCHC RBC-ENTMCNC: 31 GM/DL — LOW (ref 32–36)
MCV RBC AUTO: 94.1 FL — SIGNIFICANT CHANGE UP (ref 80–100)
PLATELET # BLD AUTO: 209 K/UL — SIGNIFICANT CHANGE UP (ref 150–400)
POTASSIUM SERPL-MCNC: 3.4 MMOL/L — LOW (ref 3.5–5.3)
POTASSIUM SERPL-SCNC: 3.4 MMOL/L — LOW (ref 3.5–5.3)
RBC # BLD: 2.54 M/UL — LOW (ref 4.2–5.8)
RBC # FLD: 15.2 % — HIGH (ref 10.3–14.5)
SODIUM SERPL-SCNC: 143 MMOL/L — SIGNIFICANT CHANGE UP (ref 135–145)
WBC # BLD: 8.05 K/UL — SIGNIFICANT CHANGE UP (ref 3.8–10.5)
WBC # FLD AUTO: 8.05 K/UL — SIGNIFICANT CHANGE UP (ref 3.8–10.5)

## 2020-11-07 PROCEDURE — 90937 HEMODIALYSIS REPEATED EVAL: CPT

## 2020-11-07 PROCEDURE — 99233 SBSQ HOSP IP/OBS HIGH 50: CPT

## 2020-11-07 RX ORDER — LANOLIN ALCOHOL/MO/W.PET/CERES
5 CREAM (GRAM) TOPICAL AT BEDTIME
Refills: 0 | Status: DISCONTINUED | OUTPATIENT
Start: 2020-11-07 | End: 2020-11-13

## 2020-11-07 RX ORDER — POTASSIUM CHLORIDE 20 MEQ
40 PACKET (EA) ORAL ONCE
Refills: 0 | Status: COMPLETED | OUTPATIENT
Start: 2020-11-07 | End: 2020-11-07

## 2020-11-07 RX ADMIN — LEVETIRACETAM 750 MILLIGRAM(S): 250 TABLET, FILM COATED ORAL at 05:17

## 2020-11-07 RX ADMIN — Medication 81 MILLIGRAM(S): at 19:18

## 2020-11-07 RX ADMIN — ATORVASTATIN CALCIUM 40 MILLIGRAM(S): 80 TABLET, FILM COATED ORAL at 21:29

## 2020-11-07 RX ADMIN — CARVEDILOL PHOSPHATE 25 MILLIGRAM(S): 80 CAPSULE, EXTENDED RELEASE ORAL at 05:16

## 2020-11-07 RX ADMIN — Medication 1 MILLIGRAM(S): at 19:18

## 2020-11-07 RX ADMIN — PANTOPRAZOLE SODIUM 40 MILLIGRAM(S): 20 TABLET, DELAYED RELEASE ORAL at 05:16

## 2020-11-07 RX ADMIN — Medication 650 MILLIGRAM(S): at 05:16

## 2020-11-07 RX ADMIN — ENOXAPARIN SODIUM 30 MILLIGRAM(S): 100 INJECTION SUBCUTANEOUS at 21:30

## 2020-11-07 RX ADMIN — Medication 650 MILLIGRAM(S): at 21:29

## 2020-11-07 RX ADMIN — LEVETIRACETAM 750 MILLIGRAM(S): 250 TABLET, FILM COATED ORAL at 21:29

## 2020-11-07 RX ADMIN — Medication 40 MILLIEQUIVALENT(S): at 19:20

## 2020-11-07 RX ADMIN — Medication 75 MILLIGRAM(S): at 05:17

## 2020-11-07 RX ADMIN — SENNA PLUS 2 TABLET(S): 8.6 TABLET ORAL at 21:29

## 2020-11-07 RX ADMIN — Medication 75 MILLIGRAM(S): at 21:30

## 2020-11-07 NOTE — PROGRESS NOTE ADULT - SUBJECTIVE AND OBJECTIVE BOX
[facility], [facility]                                                    [address] , [address], [place] [address]                                                         PHONE: [phone]    FAX: [phone] [address], [address], [place] [address]                                                 PHONE: [phone]    FAX: [phone]  *******************************************************************************    Overnight events/Subjective Assessment: found in the bed, states feels better, less SOB, no new cardiac c/o.     INTERPRETATION OF TELEMETRY (personally reviewed): SR in 70s with PVCs     penicillin (Unknown)    MEDICATIONS  (STANDING):  aspirin enteric coated 81 milliGRAM(s) Oral daily  atorvastatin 40 milliGRAM(s) Oral at bedtime  carvedilol 25 milliGRAM(s) Oral every 12 hours  enoxaparin Injectable 30 milliGRAM(s) SubCutaneous daily  epoetin yolanda-epbx (RETACRIT) Injectable 55678 Unit(s) SubCutaneous every 7 days  folic acid 1 milliGRAM(s) Oral daily  hydrALAZINE 75 milliGRAM(s) Oral three times a day  influenza   Vaccine 0.5 milliLiter(s) IntraMuscular once  iron sucrose IVPB 200 milliGRAM(s) IV Intermittent every 48 hours  levETIRAcetam 750 milliGRAM(s) Oral two times a day  pantoprazole    Tablet 40 milliGRAM(s) Oral before breakfast  senna 2 Tablet(s) Oral at bedtime  sodium bicarbonate 650 milliGRAM(s) Oral three times a day    MEDICATIONS  (PRN):  albuterol/ipratropium for Nebulization 3 milliLiter(s) Nebulizer every 6 hours PRN Bronchospasm  hydrALAZINE Injectable 10 milliGRAM(s) IV Push every 6 hours PRN for sbp above 160 mmhg  nitroglycerin     SubLingual 0.4 milliGRAM(s) SubLingual every 5 minutes PRN Chest Pain      Vital Signs Last 24 Hrs  T(C): 36.8 (07 Nov 2020 05:15), Max: 37.1 (06 Nov 2020 20:59)  T(F): 98.3 (07 Nov 2020 05:15), Max: 98.7 (06 Nov 2020 20:59)  HR: 92 (07 Nov 2020 05:15) (68 - 92)  BP: 153/72 (07 Nov 2020 05:15) (125/66 - 153/72)  BP(mean): --  RR: 16 (07 Nov 2020 05:15) (15 - 19)  SpO2: 100% (07 Nov 2020 05:15) (95% - 100%)    I&O's Detail    06 Nov 2020 07:01  -  07 Nov 2020 07:00  --------------------------------------------------------  IN:    Oral Fluid: 360 mL  Total IN: 360 mL    OUT:    Other (mL): 500 mL  Total OUT: 500 mL    Total NET: -140 mL        I&O's Summary    06 Nov 2020 07:01  -  07 Nov 2020 07:00  --------------------------------------------------------  IN: 360 mL / OUT: 500 mL / NET: -140 mL            PHYSICAL EXAM:  General: Appears well developed, well nourished, no acute distress  HEENT: Head: normocephalic, atraumatic  Eyes: Pupils equal and reactive  Neck: Supple, no carotid bruit, no JVD, no HJR  CARDIOVASCULAR: Normal S1 and S2, 2/6 systolic murmur at base and apex  LUNGS: Clear to auscultation bilaterally, no rales, rhonchi or wheeze  ABDOMEN: Soft, nontender, non-distended, positive bowel sounds, no mass or bruit  EXTREMITIES: trace b/l LE edema, distal pulses WNL  SKIN: Warm and dry with normal turgor  NEURO: Alert & oriented x 3, grossly intact  PSYCH: normal mood and affect        LABS:                        7.4    8.05  )-----------( 209      ( 07 Nov 2020 05:49 )             23.9     11-07    143  |  109<H>  |  50.0<H>  ----------------------------<  90  3.4<L>   |  20.0<L>  |  2.82<H>    Ca    8.1<L>      07 Nov 2020 05:47            Serum Pro-Brain Natriuretic Peptide: 01316 pg/mL (11-05 @ 01:31)  Serum Pro-Brain Natriuretic Peptide: 95964 pg/mL (11-02 @ 09:21)  serum  Lipids:     Thyroid Stimulating Hormone, Serum: 1.95 uIU/mL (11-03 @ 08:18)      RADIOLOGY & ADDITIONAL STUDIES:  < from: US Duplex Carotid Arteries Complete, Bilateral (11.05.20 @ 16:51) >  IMPRESSION:    Moderate plaque in the right carotid bulb with elevated peak systolic and end-diastolic velocities which overall are suggestive of a 50-69% stenosis as described above, however given the peak systolic velocity is suggestive of a greater than 70% stenosis, aCTA of the neck is recommended for further evaluation.    Mild to moderate plaque in the left carotid bulb with less than 50% left internal carotid artery stenosis.      < end of copied text >    ECHO:  < from: TTE Echo Complete w/ Contrast w/ Doppler (11.04.20 @ 08:37) >    PHYSICIAN INTERPRETATION:  Left Ventricle: Left ventricular wall thickness is moderately increased.  Global LV systolic function was mildly decreased. Left ventricular ejection fraction, by visual estimation, is 50 to 55%. Spectral Doppler shows pseudonormal pattern of left ventricular myocardial filling (Grade II diastolic dysfunction).  Right Ventricle: The right ventricular size is normal. RV systolic function is mildly reduced.  Left Atrium: Severely enlarged left atrium.  Right Atrium: Mildly enlarged right atrium.  Pericardium: Trivial pericardial effusion is present. The pericardial effusion is posterior to the left ventricle.  Mitral Valve: Thickening of the anterior and posterior mitral valve leaflets. There is mild mitral annular calcification. Moderate to severe mitral valve regurgitation is seen.  Tricuspid Valve: Moderate tricuspid regurgitation is visualized. Estimated pulmonary artery systolic pressure is 43.8 mmHg assuming a right atrial pressure of 8 mmHg, which is consistent with mild pulmonary hypertension.  Aortic Valve: Peak transaortic gradient equals 4.4 mmHg, mean transaortic gradient equals 2.3 mmHg, the calculated aortic valve area equals 3.18 cm² by the continuity equation consistent with normally opening aortic valve. Moderate to severe aortic valve regurgitation is seen.  Pulmonic Valve: Mild pulmonic valve regurgitation.  Aorta: There is dilatation of the aortic root. Aortic Root measuring 4.2 cm.  Pulmonary Artery: The main pulmonary artery is normal in size.  Venous: The inferior vena cava was dilated, with respiratory size variation greater than 50%.      Summary:   1. Left ventricular ejection fraction, by visual estimation, is 50 to 55%.   2. Mildly decreased global left ventricular systolic function.   3. Severely enlarged left atrium.   4. Moderately increased LV wall thickness.   5. Spectral Doppler shows pseudonormal pattern of left ventricular myocardial filling (Grade II diastolic dysfunction).   6. Mildly reduced RV systolic function.   7. Mildly enlarged right atrium.   8. Mild mitral annular calcification.   9. Moderate to severe mitral valve regurgitation.  10. Thickening of the anterior and posterior mitral valve leaflets.  11. Moderate tricuspid regurgitation.  12. Moderate to severe aortic regurgitation.  13. Mild pulmonic valve regurgitation.  14. Dilatation of the aortic root.  15. Aortic Root measuring 4.2 cm.  16. Estimated pulmonary artery systolic pressure is 43.8 mmHg assuming a right atrial pressure of 8 mmHg, which is consistent with mild pulmonary hypertension.      < end of copied text >        ASSESSMENT AND PLAN:  In summary, JONATHAN GIBSON is a 60y man with HTN, HLD, CAD s/p remote stent (most recent nuclear stress test 11/2019 showing infarct without ischemia), ascending aortic dissection s/p repair (2001), chronic diastolic HFpEF, moderate valvular heart disease including moderate aortic and mitral regurgitation, admitted for evaluation of shortness of breath, found to have new anemia and SANTOS in the setting of markedly elevated BP, incidentally found to have mildly elevated troponin.      - The patient has been chest pain free since admission.  Mildly elevated initial troponin is nonspecific, especially in the setting of SANTOS.  Nothing to suggest acute MI.  Would repeat ECG should symptoms recur.   - Echocardiogram 11/4/20 EF 50-55%, grade II diastolic dysfunction, mildly reduced RV systolic function, severe LAE, mild KOLTON, mod-severe MR and AI, mod TR, dilated aortic root @ 4.2cm  - Mild HF symptoms (acute on chronic diastolic/valvular HF) on admission improving following IV lasix.  Renal function worsened with diuresis and is improving with discontinuation of diuretic.  BNP improved, but still elevated.  Will continue to monitor symptoms.  If worsening symptoms will need additional diuresis or possibly fluid removal with HD  - Rhythm/hemodynamics stable: BP markedly elevated on admission now improved following restarting of PO regimen.  Continue Coreg 25mg BID and Hydralazine 75mg TID; titrate prn.  Avoid nephrotoxic agents  - The patient has been evaluated by Dr. Butler of [facility].  He will likely benefit from valve surgery.  He will need CTA of the chest and cardiac cath prior to surgery.  In setting of SANTOS/CKD, he will likely have worsening renal function with contrast studies and OHS.  He will likely require initiation of HD, which will hopefully be temporary, but may become permanent.  Further planning/timing as per Dr. Butler.  - Acute anemia, worsening: symptoms of progressive shortness of breath are at least in part related to anemia which continues to progress.  He is laying flat and has no evidence of decompensated HF.  PVCs and short 3-4 beats of NSVT do not constitute significant arrhythmia.  He has regurgitant valvular heart disease which is not a contraindication to procedures/surgery.   There is nothing to suggest active ischemia.  Therefore there is no absolute cardiac contraindication to proceeding with EGD/colonoscopy as indicated, particularly in light of progressive symptomatic anemia.  Given his comorbidities, he does remain at increased risk of CV events with any procedure/surgery, however. As open heart surgery is being considered, this will likely need to be completed prior to surgery.  - ASA 81mg daily needs to be continued in an uninterrupted fashion given CAD with prior coronary stents.  - HLD: continue lipitor 40   - Carotid US 11/5/20 Moderate, possibly > 70% VANESSA stenosis, < 50% LICA stenosis.  Continue ASA and statin.  Consider CTA of the carotids.  - Keep K > 4, Mg > 2  - discussed with CT

## 2020-11-07 NOTE — DIETITIAN INITIAL EVALUATION ADULT. - OTHER INFO
61 y/o M with PMH of CAD s/p stent in LAD, leaky aortic valve, uncontrolled bp, thoracic aortic dissection requiring emergent surgery, CVA with left sided sensory loss, colostomy for bowel ischemia s/p reversal, psoriasis on Humira injections presents from home with shortness of breath.  Multiple attempts made. Pt sleeping. Unable to obtain nutrition hx at this time. No weight documented. Per previous admission October 2019, weight documented at 200#. PO intake not documented at this time. Per RN, pt had breakfast meal this morning. Per nephrology note, pt plan for HD today. 2+ edema noted. RD to follow up with interview as feasible. RD to remain available.

## 2020-11-07 NOTE — DIETITIAN INITIAL EVALUATION ADULT. - PERTINENT MEDS FT
MEDICATIONS  (STANDING):  aspirin enteric coated 81 milliGRAM(s) Oral daily  atorvastatin 40 milliGRAM(s) Oral at bedtime  carvedilol 25 milliGRAM(s) Oral every 12 hours  enoxaparin Injectable 30 milliGRAM(s) SubCutaneous daily  epoetin yolanda-epbx (RETACRIT) Injectable 32755 Unit(s) SubCutaneous every 7 days  folic acid 1 milliGRAM(s) Oral daily  hydrALAZINE 75 milliGRAM(s) Oral three times a day  influenza   Vaccine 0.5 milliLiter(s) IntraMuscular once  iron sucrose IVPB 200 milliGRAM(s) IV Intermittent every 48 hours  levETIRAcetam 750 milliGRAM(s) Oral two times a day  pantoprazole    Tablet 40 milliGRAM(s) Oral before breakfast  potassium chloride    Tablet ER 40 milliEquivalent(s) Oral once  senna 2 Tablet(s) Oral at bedtime  sodium bicarbonate 650 milliGRAM(s) Oral three times a day    MEDICATIONS  (PRN):  albuterol/ipratropium for Nebulization 3 milliLiter(s) Nebulizer every 6 hours PRN Bronchospasm  hydrALAZINE Injectable 10 milliGRAM(s) IV Push every 6 hours PRN for sbp above 160 mmhg  nitroglycerin     SubLingual 0.4 milliGRAM(s) SubLingual every 5 minutes PRN Chest Pain

## 2020-11-07 NOTE — DIETITIAN INITIAL EVALUATION ADULT. - ADD RECOMMEND
Monitor PO intake and tolerance to diet. Monitor labs. Monitor weights. If poor PO intake recommend Nepro BID to optimize PO intake and provide an additional 425kcal and 19.1g protein per serving.

## 2020-11-07 NOTE — DIETITIAN INITIAL EVALUATION ADULT. - ORAL INTAKE PTA/DIET HISTORY
Multiple attempts made. Pt sleeping. Unable to obtain nutrition hx. RD to follow up with interview as feasible.

## 2020-11-07 NOTE — PROGRESS NOTE ADULT - SUBJECTIVE AND OBJECTIVE BOX
Subjective: Pt. lying in bed, denies any SOB or chest pain, NAD noted.    VITAL SIGNS  Vital Signs Last 24 Hrs  T(C): 36.7 (20 @ 16:40), Max: 37.1 (20 @ 20:59)  T(F): 98.1 (20 @ 16:40), Max: 98.7 (20 @ 20:59)  HR: 79 (20 @ 16:40) (64 - 92)  BP: 150/60 (20 @ 16:40) (116/58 - 153/72)  RR: 19 (20 @ 16:40) (16 - 19)  SpO2: 100% (20 @ 16:40) (95% - 100%)  on (O2)              Telemetry:  Sinus rhythm  LVEF:     MEDICATIONS  albuterol/ipratropium for Nebulization 3 milliLiter(s) Nebulizer every 6 hours PRN  aspirin enteric coated 81 milliGRAM(s) Oral daily  atorvastatin 40 milliGRAM(s) Oral at bedtime  carvedilol 25 milliGRAM(s) Oral every 12 hours  enoxaparin Injectable 30 milliGRAM(s) SubCutaneous daily  epoetin yolanda-epbx (RETACRIT) Injectable 80191 Unit(s) SubCutaneous every 7 days  folic acid 1 milliGRAM(s) Oral daily  hydrALAZINE 75 milliGRAM(s) Oral three times a day  hydrALAZINE Injectable 10 milliGRAM(s) IV Push every 6 hours PRN  influenza   Vaccine 0.5 milliLiter(s) IntraMuscular once  iron sucrose IVPB 200 milliGRAM(s) IV Intermittent every 48 hours  levETIRAcetam 750 milliGRAM(s) Oral two times a day  nitroglycerin     SubLingual 0.4 milliGRAM(s) SubLingual every 5 minutes PRN  pantoprazole    Tablet 40 milliGRAM(s) Oral before breakfast  potassium chloride    Tablet ER 40 milliEquivalent(s) Oral once  senna 2 Tablet(s) Oral at bedtime  sodium bicarbonate 650 milliGRAM(s) Oral three times a day      PHYSICAL EXAM  General: well nourished, well developed, no acute distress  Neurology: alert and oriented x 3, nonfocal, no gross deficits  Respiratory: clear to auscultation bilaterally  CV: regular rate and rhythm, normal S1, S2  Abdomen: soft, nontender, nondistended, positive bowel sounds  Extremities: warm, well perfused. no edema. + DP pulses    I&O's Detail    2020 07:01  -  2020 07:00  --------------------------------------------------------  IN:    Oral Fluid: 360 mL  Total IN: 360 mL    OUT:    Other (mL): 500 mL  Total OUT: 500 mL    Total NET: -140 mL          Weights:  Daily     Daily Weight in k.8 (2020 16:40)  Admit Wt: Drug Dosing Weight  Height (cm): 165.1 (2020 08:55)  Weight (kg): 98 (11 Oct 2019 11:37)  BMI (kg/m2): 36 (2020 08:55)  BSA (m2): 2.04 (2020 08:55)    LABS      143  |  109<H>  |  50.0<H>  ----------------------------<  90  3.4<L>   |  20.0<L>  |  2.82<H>    Ca    8.1<L>      2020 05:47                                   7.4    8.05  )-----------( 209      ( 2020 05:49 )             23.9              Occult Blood, Feces: Negative (20 @ 02:50)        CAPILLARY BLOOD GLUCOSE               Today's CXR: < from: Xray Chest 1 View- PORTABLE-Urgent (Xray Chest 1 View- PORTABLE-Urgent .) (20 @ 14:51) >  EXAM:  XR CHEST PORTABLE URGENT 1V                          PROCEDURE DATE:  2020          INTERPRETATION:  AP semierect chest on 2020 at 2:15 PM. Patient had right jugular line placement for dialysis.    Heart enlargement and sternotomy again noted.    There is a persistent small left base effusion.    Right base infiltrate on 2019 is no longer evident.    Present film shows a large-caliber right jugular line with tip in superior vena caval area. Clips in right axilla again noted.    IMPRESSION: Heart enlargement and small left effusion again noted. Right jugular line inserted.              NINA CARRASQUILLO MD; Attending Radiologist  This document has been electronically signed. 2020  3:59PM    < end of copied text >    < from: TTE Echo Complete w/ Contrast w/ Doppler (20 @ 08:37) >  EXAM:  ECHO TTE WITH CON COMP W DOPP      PROCEDURE DATE:  2020   .      INTERPRETATION:  REPORT:  TRANSTHORACIC ECHOCARDIOGRAM REPORT        Patient Name:   JONATHAN GIBSON JR Patient Location: Inpatient  Medical Rec #:  [id number]         Accession #:      [id number]  Account #:      [id number]         Height:           69.0 in 175.3 cm  YOB: 1960          Weight:           220.0 lb 99.79 kg  Patient Age:    60 years           BSA:              2.15 m²  Patient Gender: M                BP:               177/91 mmHg      Date of Exam:        2020 8:37:55 AM  Sonographer:         Ana Laura Guy  Referring Physician: Poli Gardner MD    Procedure:     2D Echo/Doppler/Color Doppler Complete.  Indications:   Cardiomyopathy, unspecified - I42.9  Diagnosis:     Cardiomyopathy, unspecified - I42.9  Study Details: Technically adequate study.        2D AND M-MODE MEASUREMENTS (normal ranges within parentheses):  Left                 Normal   Aorta/Left            Normal  Ventricle:                    Atrium:  IVSd (2D):    1.63  (0.7-1.1) Aortic Root  4.20 cm (2.4-3.7)                 cm             (Mmode):  LVPWd (2D):   1.45  (0.7-1.1) Left Atrium  4.59 cm (1.9-4.0)                 cm             (2D):  LVIDd (2D):   5.24  (3.4-5.7) LA Volume     51.8                 cm             Index         ml/m²  LVIDs (2D):   4.06            Right Ventricle:                 cm             RVd (2D):        4.14 cm  LV FS (2D):   22.4   (>25%)                  %  Relative Wall 0.55   (<0.42)  Thickness    LV SYSTOLIC FUNCTION BY 2D PLANIMETRY (MOD):  EF-Biplane: 52 %    LV DIASTOLIC FUNCTION:  MV Peak E: 1.52 m/s e', MV Larisa: 0.10 m/s                      E/e' Ratio: 14.49    SPECTRAL DOPPLER ANALYSIS (where applicable):  Aortic Valve: AoV Max Michael: 1.05 m/s AoV Peak P.4 mmHg AoV Mean P.3 mmHg    LVOT Vmax: 1.03 m/s LVOT VTI: 0.194 m LVOT Diameter: 2.10 cm    AoV Area, Vmax: 3.40 cm² AoV Area, VTI: 3.18 cm² AoV Area, Vmn: 3.03 cm²  Ao VTI: 0.212  Aortic Insufficiency:  AI Half-time:  305 msec  AI Decel Rate: 3.03 m/s²    Tricuspid Valve and PA/RV Systolic Pressure: TR Max Velocity: 2.99 m/s RA Pressure: 8 mmHg RVSP/PASP: 43.8 mmHg      PHYSICIAN INTERPRETATION:  Left Ventricle: Left ventricular wall thickness is moderately increased.  Global LV systolic function was mildly decreased. Left ventricular ejection fraction, by visual estimation, is 50 to 55%. Spectral Doppler shows pseudonormal pattern of left ventricular myocardial filling (Grade II diastolic dysfunction).  Right Ventricle: The right ventricular size is normal. RV systolic function is mildly reduced.  Left Atrium: Severely enlarged left atrium.  Right Atrium: Mildly enlarged right atrium.  Pericardium: Trivial pericardial effusion is present. The pericardial effusion is posterior to the left ventricle.  Mitral Valve: Thickening of the anterior and posterior mitral valve leaflets. There is mild mitral annular calcification. Moderate to severe mitral valve regurgitation is seen.  Tricuspid Valve: Moderate tricuspid regurgitation is visualized. Estimated pulmonary artery systolic pressure is 43.8 mmHg assuming a right atrial pressure of 8 mmHg, which is consistent with mild pulmonary hypertension.  Aortic Valve: Peak transaortic gradient equals 4.4 mmHg, mean transaortic gradient equals 2.3 mmHg, the calculated aortic valve area equals 3.18 cm² by the continuity equation consistent with normally opening aortic valve. Moderate to severe aortic valve regurgitation is seen.  Pulmonic Valve: Mild pulmonic valve regurgitation.  Aorta: There is dilatation of the aortic root. Aortic Root measuring 4.2 cm.  Pulmonary Artery: The main pulmonary artery is normal in size.  Venous: The inferior vena cava was dilated, with respiratory size variation greater than 50%.      Summary:   1. Left ventricular ejection fraction, by visual estimation, is 50 to 55%.   2. Mildly decreased global left ventricular systolic function.   3. Severely enlarged left atrium.   4. Moderately increased LV wall thickness.   5. Spectral Doppler shows pseudonormal pattern of left ventricular myocardial filling (Grade II diastolic dysfunction).   6. Mildly reduced RV systolic function.   7. Mildly enlarged right atrium.   8. Mild mitral annular calcification.   9. Moderate to severe mitral valve regurgitation.  10. Thickening of the anterior and posterior mitral valve leaflets.  11. Moderate tricuspid regurgitation.  12. Moderate to severe aortic regurgitation.  13. Mild pulmonic valve regurgitation.  14. Dilatation of the aortic root.  15. Aortic Root measuring 4.2 cm.  16. Estimated pulmonary artery systolic pressure is 43.8 mmHg assuming a right atrial pressure of 8 mmHg, which is consistent with mild pulmonary hypertension.    MD Attila Electronically signed on 2020 at 1:38:43 PM            *** Final ***                HEATHER MARINELLI MD; Attending Cardiologist  This document has been electronically signed.     < end of copied text >      PAST MEDICAL & SURGICAL HISTORY:  CVA (cerebral vascular accident)    CHF (congestive heart failure)    H/O colectomy    Aorta disorder

## 2020-11-07 NOTE — PROGRESS NOTE ADULT - ASSESSMENT
60 yoM with PMH of CAD s/p stent in LAD, leaky aortic valve, uncontrolled bp, thoracic aortic dissection requiring emergent surgery, CVA with left sided sensory loss, colostomy for bowel ischemia s/p reversal, psoriasis on Humira injections presents from home with sob and le edema.  + CHF. + anemia, guiac negative stools. NO abdominal pain, tolerating  diet. AS per cardiology, pt with SOB improving, but breathing is not at baseline. + Episode of V tach on 11/5/2020.

## 2020-11-07 NOTE — PROGRESS NOTE ADULT - ASSESSMENT
1) Martin   2) MBD of renal dx  3) Anemia of renal dx  4) Vol HTN     started on HD  before cardiac surgery to optimize patient;  Plan for HD today; then monday  Start REINALDO with HD  UF as tolerated.

## 2020-11-07 NOTE — DIETITIAN INITIAL EVALUATION ADULT. - PERTINENT LABORATORY DATA
11-07 Na143 mmol/L Glu 90 mg/dL K+ 3.4 mmol/L<L> Cr  2.82 mg/dL<H> BUN 50.0 mg/dL<H> Phos n/a   Alb n/a   PAB n/a

## 2020-11-07 NOTE — PROGRESS NOTE ADULT - SUBJECTIVE AND OBJECTIVE BOX
Pt seen and examined f/u for TARYN    This AM he has no complaints. H/H is stable    REVIEW OF SYSTEMS:    CONSTITUTIONAL: No fever, weight loss, or fatigue  EYES: No eye pain, visual disturbances, or discharge  ENMT:  No difficulty hearing, tinnitus, vertigo; No sinus or throat pain  RESPIRATORY: No cough, wheezing, chills or hemoptysis; No shortness of breath  CARDIOVASCULAR: No chest pain, palpitations, dizziness, or leg swelling  GASTROINTESTINAL: No abdominal or epigastric pain. No nausea, vomiting, or hematemesis; No diarrhea or constipation. No melena or hematochezia.    MEDICATIONS:  MEDICATIONS  (STANDING):  aspirin enteric coated 81 milliGRAM(s) Oral daily  atorvastatin 40 milliGRAM(s) Oral at bedtime  carvedilol 25 milliGRAM(s) Oral every 12 hours  enoxaparin Injectable 30 milliGRAM(s) SubCutaneous daily  epoetin yolanda-epbx (RETACRIT) Injectable 89239 Unit(s) SubCutaneous every 7 days  folic acid 1 milliGRAM(s) Oral daily  hydrALAZINE 75 milliGRAM(s) Oral three times a day  influenza   Vaccine 0.5 milliLiter(s) IntraMuscular once  iron sucrose IVPB 200 milliGRAM(s) IV Intermittent every 48 hours  levETIRAcetam 750 milliGRAM(s) Oral two times a day  pantoprazole    Tablet 40 milliGRAM(s) Oral before breakfast  senna 2 Tablet(s) Oral at bedtime  sodium bicarbonate 650 milliGRAM(s) Oral three times a day    MEDICATIONS  (PRN):  albuterol/ipratropium for Nebulization 3 milliLiter(s) Nebulizer every 6 hours PRN Bronchospasm  hydrALAZINE Injectable 10 milliGRAM(s) IV Push every 6 hours PRN for sbp above 160 mmhg  nitroglycerin     SubLingual 0.4 milliGRAM(s) SubLingual every 5 minutes PRN Chest Pain      Allergies    penicillin (Unknown)    Intolerances        Vital Signs Last 24 Hrs  T(C): 37 (07 Nov 2020 08:50), Max: 37.1 (06 Nov 2020 20:59)  T(F): 98.6 (07 Nov 2020 08:50), Max: 98.7 (06 Nov 2020 20:59)  HR: 82 (07 Nov 2020 08:50) (68 - 92)  BP: 127/67 (07 Nov 2020 08:50) (125/66 - 153/72)  BP(mean): --  RR: 18 (07 Nov 2020 08:50) (15 - 19)  SpO2: 98% (07 Nov 2020 08:50) (95% - 100%)    11-06 @ 07:01  -  11-07 @ 07:00  --------------------------------------------------------  IN: 360 mL / OUT: 500 mL / NET: -140 mL        PHYSICAL EXAM:    General: overweight male, well nourished; in no acute distress  HEENT: MMM, conjunctiva and sclera clear  Gastrointestinal:Abdomen: Soft non-tender non-distended; Normal bowel sounds; No hepatosplenomegaly  Extremities: no cyanosis, clubbing or edema.  Skin: Warm and dry. No obvious rash    LABS:      CBC Full  -  ( 07 Nov 2020 05:49 )  WBC Count : 8.05 K/uL  RBC Count : 2.54 M/uL  Hemoglobin : 7.4 g/dL  Hematocrit : 23.9 %  Platelet Count - Automated : 209 K/uL  Mean Cell Volume : 94.1 fl  Mean Cell Hemoglobin : 29.1 pg  Mean Cell Hemoglobin Concentration : 31.0 gm/dL  Auto Neutrophil # : x  Auto Lymphocyte # : x  Auto Monocyte # : x  Auto Eosinophil # : x  Auto Basophil # : x  Auto Neutrophil % : x  Auto Lymphocyte % : x  Auto Monocyte % : x  Auto Eosinophil % : x  Auto Basophil % : x    11-07    143  |  109<H>  |  50.0<H>  ----------------------------<  90  3.4<L>   |  20.0<L>  |  2.82<H>    Ca    8.1<L>      07 Nov 2020 05:47                         Pt seen and examined f/u for TARYN    This AM he has no complaints. H/H is stable. Just started HD.    REVIEW OF SYSTEMS:    CONSTITUTIONAL: No fever, weight loss, or fatigue  EYES: No eye pain, visual disturbances, or discharge  ENMT:  No difficulty hearing, tinnitus, vertigo; No sinus or throat pain  RESPIRATORY: No cough, wheezing, chills or hemoptysis; No shortness of breath  CARDIOVASCULAR: No chest pain, palpitations, dizziness, or leg swelling  GASTROINTESTINAL: No abdominal or epigastric pain. No nausea, vomiting, or hematemesis; No diarrhea or constipation. No melena or hematochezia.    MEDICATIONS:  MEDICATIONS  (STANDING):  aspirin enteric coated 81 milliGRAM(s) Oral daily  atorvastatin 40 milliGRAM(s) Oral at bedtime  carvedilol 25 milliGRAM(s) Oral every 12 hours  enoxaparin Injectable 30 milliGRAM(s) SubCutaneous daily  epoetin yolanda-epbx (RETACRIT) Injectable 60503 Unit(s) SubCutaneous every 7 days  folic acid 1 milliGRAM(s) Oral daily  hydrALAZINE 75 milliGRAM(s) Oral three times a day  influenza   Vaccine 0.5 milliLiter(s) IntraMuscular once  iron sucrose IVPB 200 milliGRAM(s) IV Intermittent every 48 hours  levETIRAcetam 750 milliGRAM(s) Oral two times a day  pantoprazole    Tablet 40 milliGRAM(s) Oral before breakfast  senna 2 Tablet(s) Oral at bedtime  sodium bicarbonate 650 milliGRAM(s) Oral three times a day    MEDICATIONS  (PRN):  albuterol/ipratropium for Nebulization 3 milliLiter(s) Nebulizer every 6 hours PRN Bronchospasm  hydrALAZINE Injectable 10 milliGRAM(s) IV Push every 6 hours PRN for sbp above 160 mmhg  nitroglycerin     SubLingual 0.4 milliGRAM(s) SubLingual every 5 minutes PRN Chest Pain      Allergies    penicillin (Unknown)    Intolerances        Vital Signs Last 24 Hrs  T(C): 37 (07 Nov 2020 08:50), Max: 37.1 (06 Nov 2020 20:59)  T(F): 98.6 (07 Nov 2020 08:50), Max: 98.7 (06 Nov 2020 20:59)  HR: 82 (07 Nov 2020 08:50) (68 - 92)  BP: 127/67 (07 Nov 2020 08:50) (125/66 - 153/72)  BP(mean): --  RR: 18 (07 Nov 2020 08:50) (15 - 19)  SpO2: 98% (07 Nov 2020 08:50) (95% - 100%)    11-06 @ 07:01  -  11-07 @ 07:00  --------------------------------------------------------  IN: 360 mL / OUT: 500 mL / NET: -140 mL        PHYSICAL EXAM:    General: overweight male, well nourished; in no acute distress  HEENT: MMM, conjunctiva and sclera clear  Gastrointestinal:Abdomen: Soft non-tender non-distended; Normal bowel sounds; No hepatosplenomegaly  Extremities: no cyanosis, clubbing or edema.  Skin: Warm and dry. No obvious rash    LABS:      CBC Full  -  ( 07 Nov 2020 05:49 )  WBC Count : 8.05 K/uL  RBC Count : 2.54 M/uL  Hemoglobin : 7.4 g/dL  Hematocrit : 23.9 %  Platelet Count - Automated : 209 K/uL  Mean Cell Volume : 94.1 fl  Mean Cell Hemoglobin : 29.1 pg  Mean Cell Hemoglobin Concentration : 31.0 gm/dL  Auto Neutrophil # : x  Auto Lymphocyte # : x  Auto Monocyte # : x  Auto Eosinophil # : x  Auto Basophil # : x  Auto Neutrophil % : x  Auto Lymphocyte % : x  Auto Monocyte % : x  Auto Eosinophil % : x  Auto Basophil % : x    11-07    143  |  109<H>  |  50.0<H>  ----------------------------<  90  3.4<L>   |  20.0<L>  |  2.82<H>    Ca    8.1<L>      07 Nov 2020 05:47                         Pt seen and examined f/u for TARYN    This AM he has no complaints. H/H is stable. Just started HD. Stool for OB was negative.    REVIEW OF SYSTEMS:    CONSTITUTIONAL: No fever, weight loss, or fatigue  EYES: No eye pain, visual disturbances, or discharge  ENMT:  No difficulty hearing, tinnitus, vertigo; No sinus or throat pain  RESPIRATORY: No cough, wheezing, chills or hemoptysis; No shortness of breath  CARDIOVASCULAR: No chest pain, palpitations, dizziness, or leg swelling  GASTROINTESTINAL: No abdominal or epigastric pain. No nausea, vomiting, or hematemesis; No diarrhea or constipation. No melena or hematochezia.    MEDICATIONS:  MEDICATIONS  (STANDING):  aspirin enteric coated 81 milliGRAM(s) Oral daily  atorvastatin 40 milliGRAM(s) Oral at bedtime  carvedilol 25 milliGRAM(s) Oral every 12 hours  enoxaparin Injectable 30 milliGRAM(s) SubCutaneous daily  epoetin yolanda-epbx (RETACRIT) Injectable 18487 Unit(s) SubCutaneous every 7 days  folic acid 1 milliGRAM(s) Oral daily  hydrALAZINE 75 milliGRAM(s) Oral three times a day  influenza   Vaccine 0.5 milliLiter(s) IntraMuscular once  iron sucrose IVPB 200 milliGRAM(s) IV Intermittent every 48 hours  levETIRAcetam 750 milliGRAM(s) Oral two times a day  pantoprazole    Tablet 40 milliGRAM(s) Oral before breakfast  senna 2 Tablet(s) Oral at bedtime  sodium bicarbonate 650 milliGRAM(s) Oral three times a day    MEDICATIONS  (PRN):  albuterol/ipratropium for Nebulization 3 milliLiter(s) Nebulizer every 6 hours PRN Bronchospasm  hydrALAZINE Injectable 10 milliGRAM(s) IV Push every 6 hours PRN for sbp above 160 mmhg  nitroglycerin     SubLingual 0.4 milliGRAM(s) SubLingual every 5 minutes PRN Chest Pain      Allergies    penicillin (Unknown)    Intolerances        Vital Signs Last 24 Hrs  T(C): 37 (07 Nov 2020 08:50), Max: 37.1 (06 Nov 2020 20:59)  T(F): 98.6 (07 Nov 2020 08:50), Max: 98.7 (06 Nov 2020 20:59)  HR: 82 (07 Nov 2020 08:50) (68 - 92)  BP: 127/67 (07 Nov 2020 08:50) (125/66 - 153/72)  BP(mean): --  RR: 18 (07 Nov 2020 08:50) (15 - 19)  SpO2: 98% (07 Nov 2020 08:50) (95% - 100%)    11-06 @ 07:01  -  11-07 @ 07:00  --------------------------------------------------------  IN: 360 mL / OUT: 500 mL / NET: -140 mL        PHYSICAL EXAM:    General: overweight male, well nourished; in no acute distress  HEENT: MMM, conjunctiva and sclera clear  Gastrointestinal:Abdomen: Soft non-tender non-distended; Normal bowel sounds; No hepatosplenomegaly  Extremities: no cyanosis, clubbing or edema.  Skin: Warm and dry. No obvious rash    LABS:      CBC Full  -  ( 07 Nov 2020 05:49 )  WBC Count : 8.05 K/uL  RBC Count : 2.54 M/uL  Hemoglobin : 7.4 g/dL  Hematocrit : 23.9 %  Platelet Count - Automated : 209 K/uL  Mean Cell Volume : 94.1 fl  Mean Cell Hemoglobin : 29.1 pg  Mean Cell Hemoglobin Concentration : 31.0 gm/dL  Auto Neutrophil # : x  Auto Lymphocyte # : x  Auto Monocyte # : x  Auto Eosinophil # : x  Auto Basophil # : x  Auto Neutrophil % : x  Auto Lymphocyte % : x  Auto Monocyte % : x  Auto Eosinophil % : x  Auto Basophil % : x    11-07    143  |  109<H>  |  50.0<H>  ----------------------------<  90  3.4<L>   |  20.0<L>  |  2.82<H>    Ca    8.1<L>      07 Nov 2020 05:47

## 2020-11-07 NOTE — PROGRESS NOTE ADULT - PROBLEM SELECTOR PLAN 1
Needs gated CTA chest with IV contrast when renal function improves.  Nephrology following, will need to start on HD before cardiac surgery to optimize patient  HD today, then Monday as per renal  As per Dr. Fenton,  will schedule pt. for left & right cath next week  Continue plan as per primary team  Discussed plan with Dr. Fisher

## 2020-11-07 NOTE — PROGRESS NOTE ADULT - SUBJECTIVE AND OBJECTIVE BOX
[facility] DIVISION OF KIDNEY DISEASES AND HYPERTENSION -- FOLLOW UP NOTE  --------------------------------------------------------------------------------  Chief Complaint: eric    24 hour events/subjective:  s/p HD yesterday; tolerated well      PAST HISTORY  --------------------------------------------------------------------------------  No significant changes to PMH, PSH, FHx, SHx, unless otherwise noted    ALLERGIES & MEDICATIONS  --------------------------------------------------------------------------------  Allergies    penicillin (Unknown)    Intolerances      Standing Inpatient Medications  aspirin enteric coated 81 milliGRAM(s) Oral daily  atorvastatin 40 milliGRAM(s) Oral at bedtime  carvedilol 25 milliGRAM(s) Oral every 12 hours  enoxaparin Injectable 30 milliGRAM(s) SubCutaneous daily  epoetin yolanda-epbx (RETACRIT) Injectable 91869 Unit(s) SubCutaneous every 7 days  folic acid 1 milliGRAM(s) Oral daily  hydrALAZINE 75 milliGRAM(s) Oral three times a day  influenza   Vaccine 0.5 milliLiter(s) IntraMuscular once  iron sucrose IVPB 200 milliGRAM(s) IV Intermittent every 48 hours  levETIRAcetam 750 milliGRAM(s) Oral two times a day  pantoprazole    Tablet 40 milliGRAM(s) Oral before breakfast  potassium chloride    Tablet ER 40 milliEquivalent(s) Oral once  senna 2 Tablet(s) Oral at bedtime  sodium bicarbonate 650 milliGRAM(s) Oral three times a day    PRN Inpatient Medications  albuterol/ipratropium for Nebulization 3 milliLiter(s) Nebulizer every 6 hours PRN  hydrALAZINE Injectable 10 milliGRAM(s) IV Push every 6 hours PRN  nitroglycerin     SubLingual 0.4 milliGRAM(s) SubLingual every 5 minutes PRN      REVIEW OF SYSTEMS  --------------------------------------------------------------------------------  Gen: No weight changes, fatigue, fevers/chills, weakness  Skin: No rashes  Head/Eyes/Ears/Mouth: No headache; Normal hearing; Normal vision w/o blurriness; No sinus pain/discomfort, sore throat  Respiratory: No dyspnea, cough, wheezing, hemoptysis  CV: No chest pain, PND, orthopnea  GI: No abdominal pain, diarrhea, constipation, nausea, vomiting, melena, hematochezia  : No increased frequency, dysuria, hematuria, nocturia  MSK: No joint pain/swelling; no back pain; no edema  Neuro: No dizziness/lightheadedness, weakness, seizures, numbness, tingling  Heme: No easy bruising or bleeding  Endo: No heat/cold intolerance  Psych: No significant nervousness, anxiety, stress, depression    All other systems were reviewed and are negative, except as noted.    VITALS/PHYSICAL EXAM  --------------------------------------------------------------------------------  T(C): 37 (11-07-20 @ 08:50), Max: 37.1 (11-06-20 @ 20:59)  HR: 82 (11-07-20 @ 08:50) (75 - 92)  BP: 127/67 (11-07-20 @ 08:50) (127/67 - 153/72)  RR: 18 (11-07-20 @ 08:50) (16 - 19)  SpO2: 98% (11-07-20 @ 08:50) (95% - 100%)  Wt(kg): --        11-06-20 @ 07:01  -  11-07-20 @ 07:00  --------------------------------------------------------  IN: 360 mL / OUT: 500 mL / NET: -140 mL      Physical Exam:  	Gen: NAD,  	HEENT: PERRL, supple neck, clear oropharynx  	Pulm: CTA B/L  	CV: RRR, S1S2; no rub  	Back: No spinal or CVA tenderness; no sacral edema  	Abd: +BS, soft, nontender/nondistended  	: No suprapubic tenderness  	UE: Warm, no edema; no asterixis  	LE: Warm,  no edema  	Neuro: No focal deficits, intact gait  	Psych: Normal affect and mood  	Skin: Warm, without rashes  	Vascular access: RIJ non TDC    LABS/STUDIES  --------------------------------------------------------------------------------              7.4    8.05  >-----------<  209      [11-07-20 @ 05:49]              23.9     143  |  109  |  50.0  ----------------------------<  90      [11-07-20 @ 05:47]  3.4   |  20.0  |  2.82        Ca     8.1     [11-07-20 @ 05:47]            Creatinine Trend:  SCr 2.82 [11-07 @ 05:47]  SCr 3.06 [11-06 @ 07:18]  SCr 3.18 [11-05 @ 01:31]  SCr 3.30 [11-04 @ 03:15]  SCr 2.88 [11-02 @ 09:21]        Iron 29, TIBC 332, %sat 9      [11-03-20 @ 08:18]  Ferritin 230      [11-03-20 @ 08:18]  TSH 1.95      [11-03-20 @ 08:18]  Lipid: chol 154, , HDL 44, LDL --      [11-03-20 @ 08:18]

## 2020-11-08 LAB
ANION GAP SERPL CALC-SCNC: 11 MMOL/L — SIGNIFICANT CHANGE UP (ref 5–17)
ANION GAP SERPL CALC-SCNC: 12 MMOL/L — SIGNIFICANT CHANGE UP (ref 5–17)
BUN SERPL-MCNC: 42 MG/DL — HIGH (ref 8–20)
BUN SERPL-MCNC: 49 MG/DL — HIGH (ref 8–20)
CALCIUM SERPL-MCNC: 8.1 MG/DL — LOW (ref 8.6–10.2)
CALCIUM SERPL-MCNC: 8.3 MG/DL — LOW (ref 8.6–10.2)
CHLORIDE SERPL-SCNC: 104 MMOL/L — SIGNIFICANT CHANGE UP (ref 98–107)
CHLORIDE SERPL-SCNC: 105 MMOL/L — SIGNIFICANT CHANGE UP (ref 98–107)
CO2 SERPL-SCNC: 21 MMOL/L — LOW (ref 22–29)
CO2 SERPL-SCNC: 23 MMOL/L — SIGNIFICANT CHANGE UP (ref 22–29)
CREAT SERPL-MCNC: 2.53 MG/DL — HIGH (ref 0.5–1.3)
CREAT SERPL-MCNC: 2.7 MG/DL — HIGH (ref 0.5–1.3)
GLUCOSE SERPL-MCNC: 102 MG/DL — HIGH (ref 70–99)
GLUCOSE SERPL-MCNC: 103 MG/DL — HIGH (ref 70–99)
HCT VFR BLD CALC: 27.4 % — LOW (ref 39–50)
HGB BLD-MCNC: 8.6 G/DL — LOW (ref 13–17)
MAGNESIUM SERPL-MCNC: 1.8 MG/DL — SIGNIFICANT CHANGE UP (ref 1.8–2.6)
MCHC RBC-ENTMCNC: 29.2 PG — SIGNIFICANT CHANGE UP (ref 27–34)
MCHC RBC-ENTMCNC: 31.4 GM/DL — LOW (ref 32–36)
MCV RBC AUTO: 92.9 FL — SIGNIFICANT CHANGE UP (ref 80–100)
PLATELET # BLD AUTO: 214 K/UL — SIGNIFICANT CHANGE UP (ref 150–400)
POTASSIUM SERPL-MCNC: 3.7 MMOL/L — SIGNIFICANT CHANGE UP (ref 3.5–5.3)
POTASSIUM SERPL-MCNC: 4.2 MMOL/L — SIGNIFICANT CHANGE UP (ref 3.5–5.3)
POTASSIUM SERPL-SCNC: 3.7 MMOL/L — SIGNIFICANT CHANGE UP (ref 3.5–5.3)
POTASSIUM SERPL-SCNC: 4.2 MMOL/L — SIGNIFICANT CHANGE UP (ref 3.5–5.3)
RBC # BLD: 2.95 M/UL — LOW (ref 4.2–5.8)
RBC # FLD: 15.5 % — HIGH (ref 10.3–14.5)
SODIUM SERPL-SCNC: 138 MMOL/L — SIGNIFICANT CHANGE UP (ref 135–145)
SODIUM SERPL-SCNC: 138 MMOL/L — SIGNIFICANT CHANGE UP (ref 135–145)
WBC # BLD: 8.31 K/UL — SIGNIFICANT CHANGE UP (ref 3.8–10.5)
WBC # FLD AUTO: 8.31 K/UL — SIGNIFICANT CHANGE UP (ref 3.8–10.5)

## 2020-11-08 PROCEDURE — 99233 SBSQ HOSP IP/OBS HIGH 50: CPT

## 2020-11-08 RX ORDER — SOD SULF/SODIUM/NAHCO3/KCL/PEG
1000 SOLUTION, RECONSTITUTED, ORAL ORAL
Refills: 0 | Status: COMPLETED | OUTPATIENT
Start: 2020-11-08 | End: 2020-11-08

## 2020-11-08 RX ORDER — MAGNESIUM SULFATE 500 MG/ML
2 VIAL (ML) INJECTION ONCE
Refills: 0 | Status: COMPLETED | OUTPATIENT
Start: 2020-11-08 | End: 2020-11-08

## 2020-11-08 RX ADMIN — Medication 1000 MILLILITER(S): at 22:13

## 2020-11-08 RX ADMIN — SENNA PLUS 2 TABLET(S): 8.6 TABLET ORAL at 22:13

## 2020-11-08 RX ADMIN — Medication 50 GRAM(S): at 23:17

## 2020-11-08 RX ADMIN — LEVETIRACETAM 750 MILLIGRAM(S): 250 TABLET, FILM COATED ORAL at 17:34

## 2020-11-08 RX ADMIN — LEVETIRACETAM 750 MILLIGRAM(S): 250 TABLET, FILM COATED ORAL at 06:26

## 2020-11-08 RX ADMIN — Medication 1 MILLIGRAM(S): at 14:26

## 2020-11-08 RX ADMIN — Medication 650 MILLIGRAM(S): at 06:26

## 2020-11-08 RX ADMIN — Medication 1000 MILLILITER(S): at 17:34

## 2020-11-08 RX ADMIN — Medication 650 MILLIGRAM(S): at 14:26

## 2020-11-08 RX ADMIN — Medication 5 MILLIGRAM(S): at 00:52

## 2020-11-08 RX ADMIN — Medication 75 MILLIGRAM(S): at 14:26

## 2020-11-08 RX ADMIN — Medication 81 MILLIGRAM(S): at 14:26

## 2020-11-08 RX ADMIN — Medication 5 MILLIGRAM(S): at 22:13

## 2020-11-08 RX ADMIN — Medication 75 MILLIGRAM(S): at 22:13

## 2020-11-08 RX ADMIN — Medication 650 MILLIGRAM(S): at 22:13

## 2020-11-08 RX ADMIN — CARVEDILOL PHOSPHATE 25 MILLIGRAM(S): 80 CAPSULE, EXTENDED RELEASE ORAL at 06:26

## 2020-11-08 RX ADMIN — ATORVASTATIN CALCIUM 40 MILLIGRAM(S): 80 TABLET, FILM COATED ORAL at 22:13

## 2020-11-08 RX ADMIN — CARVEDILOL PHOSPHATE 25 MILLIGRAM(S): 80 CAPSULE, EXTENDED RELEASE ORAL at 17:34

## 2020-11-08 RX ADMIN — IRON SUCROSE 110 MILLIGRAM(S): 20 INJECTION, SOLUTION INTRAVENOUS at 14:26

## 2020-11-08 RX ADMIN — PANTOPRAZOLE SODIUM 40 MILLIGRAM(S): 20 TABLET, DELAYED RELEASE ORAL at 06:26

## 2020-11-08 RX ADMIN — Medication 75 MILLIGRAM(S): at 06:26

## 2020-11-08 NOTE — PROGRESS NOTE ADULT - SUBJECTIVE AND OBJECTIVE BOX
JONATHAN GIBSON  ----------------------------------------  The patient was seen and evaluated for heart failure. Offers no complaints. Comfortable.    Vital Signs Last 24 Hrs  T(C): 36.4 (08 Nov 2020 10:40), Max: 36.9 (07 Nov 2020 19:05)  T(F): 97.6 (08 Nov 2020 10:40), Max: 98.5 (07 Nov 2020 19:05)  HR: 72 (08 Nov 2020 10:40) (64 - 80)  BP: 116/61 (08 Nov 2020 10:40) (114/64 - 155/63)  BP(mean): --  RR: 18 (08 Nov 2020 10:40) (18 - 19)  SpO2: 98% (08 Nov 2020 10:40) (97% - 100%)    PHYSICAL EXAMINATION:  ----------------------------------------  General appearance: No acute distress, Awake, Alert  HEENT: Normocephalic, Atraumatic, Conjunctiva clear, EOMI  Neck: Supple, No JVD, No tenderness  Lungs: Breath sound equal bilaterally, No wheezes, No rales  Cardiovascular: S1S2, Regular rhythm  Abdomen: Soft, Nontender, Nondistended, No guarding/rebound, Positive bowel sounds  Extremities: No clubbing, No cyanosis, No calf tenderness, Trace lower extremity edema  Neuro: Strength equal bilaterally, No tremors  Psychiatric: Appropriate mood, Normal affect    LABORATORY STUDIES:  ----------------------------------------             8.6    8.31  )-----------( 214      ( 08 Nov 2020 06:23 )             27.4     11-08    138  |  104  |  42.0<H>  ----------------------------<  102<H>  3.7   |  23.0  |  2.53<H>    Ca    8.1<L>      08 Nov 2020 06:23    MEDICATIONS  (STANDING):  aspirin enteric coated 81 milliGRAM(s) Oral daily  atorvastatin 40 milliGRAM(s) Oral at bedtime  carvedilol 25 milliGRAM(s) Oral every 12 hours  epoetin yolanda-epbx (RETACRIT) Injectable 85933 Unit(s) SubCutaneous every 7 days  folic acid 1 milliGRAM(s) Oral daily  hydrALAZINE 75 milliGRAM(s) Oral three times a day  influenza   Vaccine 0.5 milliLiter(s) IntraMuscular once  iron sucrose IVPB 200 milliGRAM(s) IV Intermittent every 48 hours  levETIRAcetam 750 milliGRAM(s) Oral two times a day  melatonin 5 milliGRAM(s) Oral at bedtime  pantoprazole    Tablet 40 milliGRAM(s) Oral before breakfast  polyethylene glycol/electrolyte Solution 1000 milliLiter(s) Oral <User Schedule>  senna 2 Tablet(s) Oral at bedtime  sodium bicarbonate 650 milliGRAM(s) Oral three times a day    MEDICATIONS  (PRN):  albuterol/ipratropium for Nebulization 3 milliLiter(s) Nebulizer every 6 hours PRN Bronchospasm  hydrALAZINE Injectable 10 milliGRAM(s) IV Push every 6 hours PRN for sbp above 160 mmhg  nitroglycerin     SubLingual 0.4 milliGRAM(s) SubLingual every 5 minutes PRN Chest Pain      ASSESSMENT / PLAN:  ----------------------------------------  61 y/o M with PMH of CAD s/p stent in LAD, leaky aortic valve, uncontrolled bp, thoracic aortic dissection requiring emergent surgery, CVA with left sided sensory loss, colostomy for bowel ischemia s/p reversal, psoriasis on Humira injections presents from home with shortness of breath.     Acute on chronic diastolic heart failure - On carvedilol and hydralazine. Dyspnea improved, no orthopnea. Aortic regurgitation and mitral regurgitation noted on echocardiogram. Cardiothoracic Surgery consultation noted, work up in progress for possible surgical intervention.    Anemia - The patient is noted to have a history of coronary artery disease. Status post transfusion of packed red blood cells. On iron sucrose and erythropoietin. Gastroenterology consultation noted, planned for upper endoscopy and flexible sigmoidoscopy tomorrow.    Coronary artery disease - Planned for eventual cardiac catheterization. On aspirin, atorvastatin, and carvedilol.    Acute kidney injury - Hemodialysis as per Nephrology. Renal ultrasound was without hydronephrosis. Hypokalemia resolved with potassium supplementation.    History of stroke - On aspirin, atorvastatin, and levetiracetam.

## 2020-11-08 NOTE — PROGRESS NOTE ADULT - ASSESSMENT
1) Martin   2) MBD of renal dx  3) Anemia of renal dx  4) Vol HTN    Started on HD  before cardiac surgery to optimize patient  Plan for HD on monday  continue REINALDO with HD  UF as tolerated.   1) Martin   2) MBD of renal dx  3) Anemia of renal dx  4) Vol HTN    Started on HD  before cardiac surgery to optimize patient  Plan for HD on tuesday  continue REINALDO with HD  UF as tolerated.

## 2020-11-08 NOTE — CHART NOTE - NSCHARTNOTEFT_GEN_A_CORE
60 yoM with PMH of CAD s/p stent in LAD, leaky aortic valve, uncontrolled bp, thoracic aortic dissection requiring emergent surgery, CVA with left sided sensory loss, colostomy for bowel ischemia s/p reversal, psoriasis on Humira injections presents from home with sob and le edema.  + CHF. + anemia, guiac negative stools. NO abdominal pain, tolerating  diet. AS per cardiology, pt with SOB improving, but breathing is not at baseline. + Episode of V tach on 11/5/2020.    Plan for CTA and CATH when renal function improves. Renal following.  cont medical mgmt as per team  will follow

## 2020-11-08 NOTE — PROGRESS NOTE ADULT - SUBJECTIVE AND OBJECTIVE BOX
Pt seen and examined f/u for TARYN with heme negative stool specimen. For possible CT surgery. Prior subtotal colectomy    This AM just c/o not sleeping. No abdominal pain, nausea or vomiting. No rectal bleeding or melena. Has been started on HD.    REVIEW OF SYSTEMS:    CONSTITUTIONAL: No fever, weight loss, or fatigue  EYES: No eye pain, visual disturbances, or discharge  ENMT:  No difficulty hearing, tinnitus, vertigo; No sinus or throat pain  RESPIRATORY: No cough, wheezing, chills or hemoptysis; No shortness of breath  CARDIOVASCULAR: No chest pain, palpitations, dizziness, or leg swelling  GASTROINTESTINAL: No abdominal or epigastric pain. No nausea, vomiting, or hematemesis; No diarrhea or constipation. No melena or hematochezia.    MEDICATIONS:  MEDICATIONS  (STANDING):  aspirin enteric coated 81 milliGRAM(s) Oral daily  atorvastatin 40 milliGRAM(s) Oral at bedtime  carvedilol 25 milliGRAM(s) Oral every 12 hours  epoetin yolanda-epbx (RETACRIT) Injectable 28776 Unit(s) SubCutaneous every 7 days  folic acid 1 milliGRAM(s) Oral daily  hydrALAZINE 75 milliGRAM(s) Oral three times a day  influenza   Vaccine 0.5 milliLiter(s) IntraMuscular once  iron sucrose IVPB 200 milliGRAM(s) IV Intermittent every 48 hours  levETIRAcetam 750 milliGRAM(s) Oral two times a day  melatonin 5 milliGRAM(s) Oral at bedtime  pantoprazole    Tablet 40 milliGRAM(s) Oral before breakfast  polyethylene glycol/electrolyte Solution 1000 milliLiter(s) Oral <User Schedule>  senna 2 Tablet(s) Oral at bedtime  sodium bicarbonate 650 milliGRAM(s) Oral three times a day    MEDICATIONS  (PRN):  albuterol/ipratropium for Nebulization 3 milliLiter(s) Nebulizer every 6 hours PRN Bronchospasm  hydrALAZINE Injectable 10 milliGRAM(s) IV Push every 6 hours PRN for sbp above 160 mmhg  nitroglycerin     SubLingual 0.4 milliGRAM(s) SubLingual every 5 minutes PRN Chest Pain      Allergies    penicillin (Unknown)    Intolerances        Vital Signs Last 24 Hrs  T(C): 36.5 (08 Nov 2020 06:24), Max: 36.9 (07 Nov 2020 19:05)  T(F): 97.7 (08 Nov 2020 06:24), Max: 98.5 (07 Nov 2020 19:05)  HR: 75 (08 Nov 2020 06:24) (64 - 80)  BP: 121/62 (08 Nov 2020 06:24) (114/64 - 155/63)  BP(mean): --  RR: 18 (08 Nov 2020 06:24) (18 - 19)  SpO2: 97% (08 Nov 2020 06:24) (97% - 100%)    11-07 @ 07:01  -  11-08 @ 07:00  --------------------------------------------------------  IN: 660 mL / OUT: 1000 mL / NET: -340 mL        PHYSICAL EXAM:    General: Well developed; well nourished; in no acute distress  HEENT: MMM, conjunctiva and sclera clear  Gastrointestinal:Abdomen: Soft non-tender non-distended; Normal bowel sounds; No hepatosplenomegaly  Extremities: no cyanosis, clubbing or edema.  Skin: Warm and dry. No obvious rash    LABS:      CBC Full  -  ( 08 Nov 2020 06:23 )  WBC Count : 8.31 K/uL  RBC Count : 2.95 M/uL  Hemoglobin : 8.6 g/dL  Hematocrit : 27.4 %  Platelet Count - Automated : 214 K/uL  Mean Cell Volume : 92.9 fl  Mean Cell Hemoglobin : 29.2 pg  Mean Cell Hemoglobin Concentration : 31.4 gm/dL  Auto Neutrophil # : x  Auto Lymphocyte # : x  Auto Monocyte # : x  Auto Eosinophil # : x  Auto Basophil # : x  Auto Neutrophil % : x  Auto Lymphocyte % : x  Auto Monocyte % : x  Auto Eosinophil % : x  Auto Basophil % : x    11-08    138  |  104  |  42.0<H>  ----------------------------<  102<H>  3.7   |  23.0  |  2.53<H>    Ca    8.1<L>      08 Nov 2020 06:23

## 2020-11-08 NOTE — PROGRESS NOTE ADULT - SUBJECTIVE AND OBJECTIVE BOX
[facility] DIVISION OF KIDNEY DISEASES AND HYPERTENSION --  FOLLOW UP NOTE  --------------------------------------------------------------------------------  Chief Complaint: Deven/Ongoing hemodialysis requirement    24 hour events/subjective:  s/p hd yesterday; tolerated well  pt has no acute complaint    PAST HISTORY  --------------------------------------------------------------------------------  No significant changes to PMH, PSH, FHx, SHx, unless otherwise noted    ALLERGIES & MEDICATIONS  --------------------------------------------------------------------------------  Allergies    penicillin (Unknown)    Intolerances      Standing Inpatient Medications  aspirin enteric coated 81 milliGRAM(s) Oral daily  atorvastatin 40 milliGRAM(s) Oral at bedtime  carvedilol 25 milliGRAM(s) Oral every 12 hours  epoetin yolanda-epbx (RETACRIT) Injectable 59921 Unit(s) SubCutaneous every 7 days  folic acid 1 milliGRAM(s) Oral daily  hydrALAZINE 75 milliGRAM(s) Oral three times a day  influenza   Vaccine 0.5 milliLiter(s) IntraMuscular once  iron sucrose IVPB 200 milliGRAM(s) IV Intermittent every 48 hours  levETIRAcetam 750 milliGRAM(s) Oral two times a day  melatonin 5 milliGRAM(s) Oral at bedtime  pantoprazole    Tablet 40 milliGRAM(s) Oral before breakfast  polyethylene glycol/electrolyte Solution 1000 milliLiter(s) Oral <User Schedule>  senna 2 Tablet(s) Oral at bedtime  sodium bicarbonate 650 milliGRAM(s) Oral three times a day    PRN Inpatient Medications  albuterol/ipratropium for Nebulization 3 milliLiter(s) Nebulizer every 6 hours PRN  hydrALAZINE Injectable 10 milliGRAM(s) IV Push every 6 hours PRN  nitroglycerin     SubLingual 0.4 milliGRAM(s) SubLingual every 5 minutes PRN      REVIEW OF SYSTEMS  --------------------------------------------------------------------------------  Gen: No weight changes, fatigue, fevers/chills, weakness  Skin: No rashes  Head/Eyes/Ears/Mouth: No headache  Respiratory: No dyspnea, cough,  CV: No chest pain, orthopnea  GI: No abdominal pain, diarrhea, constipation, nausea, vomiting,  MSK: No joint pain  Neuro: No dizziness/lightheadedness, weakness  Heme: No bleeding  Psych: No significant nervousness, anxiety, stress, depression    All other systems were reviewed and are negative, except as noted.    VITALS/PHYSICAL EXAM  --------------------------------------------------------------------------------  T(C): 36.4 (11-08-20 @ 10:40), Max: 36.9 (11-07-20 @ 19:05)  HR: 72 (11-08-20 @ 10:40) (64 - 80)  BP: 116/61 (11-08-20 @ 10:40) (114/64 - 155/63)  RR: 18 (11-08-20 @ 10:40) (18 - 19)  SpO2: 98% (11-08-20 @ 10:40) (97% - 100%)  Wt(kg): --        11-07-20 @ 07:01  -  11-08-20 @ 07:00  --------------------------------------------------------  IN: 660 mL / OUT: 1000 mL / NET: -340 mL    11-08-20 @ 07:01  -  11-08-20 @ 13:51  --------------------------------------------------------  IN: 240 mL / OUT: 300 mL / NET: -60 mL      Physical Exam:  	Gen: NAD,  	HEENT: PERRL, supple neck, clear oropharynx  	Pulm: CTA B/L  	CV: RRR, S1S2; no rub  	Back: No spinal or CVA tenderness; no sacral edema  	Abd: +BS, soft, nontender/nondistended  	: No suprapubic tenderness  	UE: Warm, no edema; no asterixis  	LE: Warm,  no edema  	Neuro: No focal deficits, intact gait  	Psych: Normal affect and mood  	Skin: Warm, without rashes  	Vascular access: RIJ non TDC    LABS/STUDIES  --------------------------------------------------------------------------------              8.6    8.31  >-----------<  214      [11-08-20 @ 06:23]              27.4     138  |  104  |  42.0  ----------------------------<  102      [11-08-20 @ 06:23]  3.7   |  23.0  |  2.53        Ca     8.1     [11-08-20 @ 06:23]            Iron 29, TIBC 332, %sat 9      [11-03-20 @ 08:18]  Ferritin 230      [11-03-20 @ 08:18]  TSH 1.95      [11-03-20 @ 08:18]  Lipid: chol 154, , HDL 44, LDL --      [11-03-20 @ 08:18]    HBsAb <3.0      [11-07-20 @ 05:41]  HBsAg Nonreact      [11-07-20 @ 05:41]  HBcAb Nonreact      [11-07-20 @ 05:41]  HCV 0.07, Nonreact      [11-07-20 @ 05:41]

## 2020-11-08 NOTE — PROGRESS NOTE ADULT - SUBJECTIVE AND OBJECTIVE BOX
[facility], [facility]                                                    [address] , [address], [place] [address]                                                         PHONE: [phone]    FAX: [phone] [address], [address], [place] [address]                                                 PHONE: [phone]    FAX: [phone]  *******************************************************************************    Overnight events/Subjective Assessment: found in the bed with no new cardiac c/o.     INTERPRETATION OF TELEMETRY (personally reviewed): SR in 70s with PVCs    penicillin (Unknown)    MEDICATIONS  (STANDING):  aspirin enteric coated 81 milliGRAM(s) Oral daily  atorvastatin 40 milliGRAM(s) Oral at bedtime  carvedilol 25 milliGRAM(s) Oral every 12 hours  epoetin yolanda-epbx (RETACRIT) Injectable 13353 Unit(s) SubCutaneous every 7 days  folic acid 1 milliGRAM(s) Oral daily  hydrALAZINE 75 milliGRAM(s) Oral three times a day  influenza   Vaccine 0.5 milliLiter(s) IntraMuscular once  iron sucrose IVPB 200 milliGRAM(s) IV Intermittent every 48 hours  levETIRAcetam 750 milliGRAM(s) Oral two times a day  melatonin 5 milliGRAM(s) Oral at bedtime  pantoprazole    Tablet 40 milliGRAM(s) Oral before breakfast  polyethylene glycol/electrolyte Solution 1000 milliLiter(s) Oral <User Schedule>  senna 2 Tablet(s) Oral at bedtime  sodium bicarbonate 650 milliGRAM(s) Oral three times a day    MEDICATIONS  (PRN):  albuterol/ipratropium for Nebulization 3 milliLiter(s) Nebulizer every 6 hours PRN Bronchospasm  hydrALAZINE Injectable 10 milliGRAM(s) IV Push every 6 hours PRN for sbp above 160 mmhg  nitroglycerin     SubLingual 0.4 milliGRAM(s) SubLingual every 5 minutes PRN Chest Pain      Vital Signs Last 24 Hrs  T(C): 36.5 (08 Nov 2020 06:24), Max: 37 (07 Nov 2020 08:50)  T(F): 97.7 (08 Nov 2020 06:24), Max: 98.6 (07 Nov 2020 08:50)  HR: 75 (08 Nov 2020 06:24) (64 - 82)  BP: 121/62 (08 Nov 2020 06:24) (114/64 - 155/63)  BP(mean): --  RR: 18 (08 Nov 2020 06:24) (18 - 19)  SpO2: 97% (08 Nov 2020 06:24) (97% - 100%)    I&O's Detail    07 Nov 2020 07:01  -  08 Nov 2020 07:00  --------------------------------------------------------  IN:    Oral Fluid: 360 mL    PRBCs (Packed Red Blood Cells): 300 mL  Total IN: 660 mL    OUT:    Other (mL): 500 mL    Voided (mL): 500 mL  Total OUT: 1000 mL    Total NET: -340 mL        I&O's Summary    07 Nov 2020 07:01  -  08 Nov 2020 07:00  --------------------------------------------------------  IN: 660 mL / OUT: 1000 mL / NET: -340 mL        PHYSICAL EXAM:  General: Appears well developed, well nourished, no acute distress  HEENT: Head: normocephalic, atraumatic  Eyes: Pupils equal and reactive  Neck: Supple, no carotid bruit, no JVD, no HJR  CARDIOVASCULAR: Normal S1 and S2, 2/6 systolic murmur at base and apex  LUNGS: Clear to auscultation bilaterally, no rales, rhonchi or wheeze  ABDOMEN: Soft, nontender, non-distended, positive bowel sounds, no mass or bruit  EXTREMITIES: trace b/l LE edema, distal pulses WNL  SKIN: Warm and dry with normal turgor  NEURO: Alert & oriented x 3, grossly intact  PSYCH: normal mood and affect        LABS:                        8.6    8.31  )-----------( 214      ( 08 Nov 2020 06:23 )             27.4     11-08    138  |  104  |  42.0<H>  ----------------------------<  102<H>  3.7   |  23.0  |  2.53<H>    Ca    8.1<L>      08 Nov 2020 06:23            Serum Pro-Brain Natriuretic Peptide: 07078 pg/mL (11-05 @ 01:31)  Serum Pro-Brain Natriuretic Peptide: 88651 pg/mL (11-02 @ 09:21)  serum  Lipids:     Thyroid Stimulating Hormone, Serum: 1.95 uIU/mL (11-03 @ 08:18)      RADIOLOGY & ADDITIONAL STUDIES:    INTERPRETATION OF TELEMETRY (personally reviewed): SR in 70s with PVCs    < from: US Duplex Carotid Arteries Complete, Bilateral (11.05.20 @ 16:51) >  IMPRESSION:    Moderate plaque in the right carotid bulb with elevated peak systolic and end-diastolic velocities which overall are suggestive of a 50-69% stenosis as described above, however given the peak systolic velocity is suggestive of a greater than 70% stenosis, aCTA of the neck is recommended for further evaluation.    Mild to moderate plaque in the left carotid bulb with less than 50% left internal carotid artery stenosis.      < end of copied text >    ECHO:  < from: TTE Echo Complete w/ Contrast w/ Doppler (11.04.20 @ 08:37) >    PHYSICIAN INTERPRETATION:  Left Ventricle: Left ventricular wall thickness is moderately increased.  Global LV systolic function was mildly decreased. Left ventricular ejection fraction, by visual estimation, is 50 to 55%. Spectral Doppler shows pseudonormal pattern of left ventricular myocardial filling (Grade II diastolic dysfunction).  Right Ventricle: The right ventricular size is normal. RV systolic function is mildly reduced.  Left Atrium: Severely enlarged left atrium.  Right Atrium: Mildly enlarged right atrium.  Pericardium: Trivial pericardial effusion is present. The pericardial effusion is posterior to the left ventricle.  Mitral Valve: Thickening of the anterior and posterior mitral valve leaflets. There is mild mitral annular calcification. Moderate to severe mitral valve regurgitation is seen.  Tricuspid Valve: Moderate tricuspid regurgitation is visualized. Estimated pulmonary artery systolic pressure is 43.8 mmHg assuming a right atrial pressure of 8 mmHg, which is consistent with mild pulmonary hypertension.  Aortic Valve: Peak transaortic gradient equals 4.4 mmHg, mean transaortic gradient equals 2.3 mmHg, the calculated aortic valve area equals 3.18 cm² by the continuity equation consistent with normally opening aortic valve. Moderate to severe aortic valve regurgitation is seen.  Pulmonic Valve: Mild pulmonic valve regurgitation.  Aorta: There is dilatation of the aortic root. Aortic Root measuring 4.2 cm.  Pulmonary Artery: The main pulmonary artery is normal in size.  Venous: The inferior vena cava was dilated, with respiratory size variation greater than 50%.      Summary:   1. Left ventricular ejection fraction, by visual estimation, is 50 to 55%.   2. Mildly decreased global left ventricular systolic function.   3. Severely enlarged left atrium.   4. Moderately increased LV wall thickness.   5. Spectral Doppler shows pseudonormal pattern of left ventricular myocardial filling (Grade II diastolic dysfunction).   6. Mildly reduced RV systolic function.   7. Mildly enlarged right atrium.   8. Mild mitral annular calcification.   9. Moderate to severe mitral valve regurgitation.  10. Thickening of the anterior and posterior mitral valve leaflets.  11. Moderate tricuspid regurgitation.  12. Moderate to severe aortic regurgitation.  13. Mild pulmonic valve regurgitation.  14. Dilatation of the aortic root.  15. Aortic Root measuring 4.2 cm.  16. Estimated pulmonary artery systolic pressure is 43.8 mmHg assuming a right atrial pressure of 8 mmHg, which is consistent with mild pulmonary hypertension.        ASSESSMENT AND PLAN:  In summary, JONATHAN GIBSON is a 60y man with HTN, HLD, CAD s/p remote stent (most recent nuclear stress test 11/2019 showing infarct without ischemia), ascending aortic dissection s/p repair (2001), chronic diastolic HFpEF, moderate valvular heart disease including moderate aortic and mitral regurgitation, admitted for evaluation of shortness of breath, found to have new anemia and SANTSO in the setting of markedly elevated BP, incidentally found to have mildly elevated troponin.      - The patient has been chest pain free since admission.  Mildly elevated initial troponin is nonspecific, especially in the setting of SANTOS.  Nothing to suggest acute MI.  Would repeat ECG should symptoms recur.   - Echocardiogram 11/4/20 EF 50-55%, grade II diastolic dysfunction, mildly reduced RV systolic function, severe LAE, mild KOLTON, mod-severe MR and AI, mod TR, dilated aortic root @ 4.2cm  - Mild HF symptoms (acute on chronic diastolic/valvular HF) on admission improving following IV lasix.  Renal function worsened with diuresis and is improving with discontinuation of diuretic.  BNP improved, but still elevated.  Will continue to monitor symptoms.  If worsening symptoms will need additional diuresis or possibly fluid removal with HD  - Rhythm/hemodynamics stable: BP markedly elevated on admission now improved following restarting of PO regimen.  Continue Coreg 25mg BID and Hydralazine 75mg TID; titrate prn.  Avoid nephrotoxic agents  - The patient has been evaluated by Dr. Butler of [facility].  He will likely benefit from valve surgery.  He will need CTA of the chest and cardiac cath prior to surgery.  In setting of SANTOS/CKD, he will likely have worsening renal function with contrast studies and OHS.  He will likely require initiation of HD, which will hopefully be temporary, but may become permanent.  Further planning/timing as per Dr. Butler.  - Acute anemia, worsening: symptoms of progressive shortness of breath are at least in part related to anemia which continues to progress.  He is laying flat and has no evidence of decompensated HF.  PVCs and short 3-4 beats of NSVT do not constitute significant arrhythmia.  He has regurgitant valvular heart disease which is not a contraindication to procedures/surgery.   There is nothing to suggest active ischemia.  Therefore there is no absolute cardiac contraindication to proceeding with EGD/colonoscopy as indicated, particularly in light of progressive symptomatic anemia.  Given his comorbidities, he does remain at increased risk of CV events with any procedure/surgery, however. As open heart surgery is being considered, this will likely need to be completed prior to surgery.  - ASA 81mg daily needs to be continued in an uninterrupted fashion given CAD with prior coronary stents.  - HLD: continue lipitor 40   - Carotid US 11/5/20 Moderate, possibly > 70% VANESSA stenosis, < 50% LICA stenosis.  Continue ASA and statin.  Consider CTA of the carotids.  - Keep K > 4, Mg > 2  - discussed with CT

## 2020-11-09 ENCOUNTER — RESULT REVIEW (OUTPATIENT)
Age: 60
End: 2020-11-09

## 2020-11-09 DIAGNOSIS — R56.9 UNSPECIFIED CONVULSIONS: ICD-10-CM

## 2020-11-09 DIAGNOSIS — I63.9 CEREBRAL INFARCTION, UNSPECIFIED: ICD-10-CM

## 2020-11-09 LAB
ANION GAP SERPL CALC-SCNC: 15 MMOL/L — SIGNIFICANT CHANGE UP (ref 5–17)
BUN SERPL-MCNC: 47 MG/DL — HIGH (ref 8–20)
CALCIUM SERPL-MCNC: 8.5 MG/DL — LOW (ref 8.6–10.2)
CHLORIDE SERPL-SCNC: 106 MMOL/L — SIGNIFICANT CHANGE UP (ref 98–107)
CO2 SERPL-SCNC: 22 MMOL/L — SIGNIFICANT CHANGE UP (ref 22–29)
CREAT SERPL-MCNC: 2.66 MG/DL — HIGH (ref 0.5–1.3)
GLUCOSE SERPL-MCNC: 93 MG/DL — SIGNIFICANT CHANGE UP (ref 70–99)
HCT VFR BLD CALC: 29.4 % — LOW (ref 39–50)
HGB BLD-MCNC: 9.2 G/DL — LOW (ref 13–17)
INR BLD: 1.25 RATIO — HIGH (ref 0.88–1.16)
MAGNESIUM SERPL-MCNC: 2.4 MG/DL — SIGNIFICANT CHANGE UP (ref 1.6–2.6)
MCHC RBC-ENTMCNC: 29.4 PG — SIGNIFICANT CHANGE UP (ref 27–34)
MCHC RBC-ENTMCNC: 31.3 GM/DL — LOW (ref 32–36)
MCV RBC AUTO: 93.9 FL — SIGNIFICANT CHANGE UP (ref 80–100)
PLATELET # BLD AUTO: 235 K/UL — SIGNIFICANT CHANGE UP (ref 150–400)
POTASSIUM SERPL-MCNC: 3.8 MMOL/L — SIGNIFICANT CHANGE UP (ref 3.5–5.3)
POTASSIUM SERPL-SCNC: 3.8 MMOL/L — SIGNIFICANT CHANGE UP (ref 3.5–5.3)
PROTHROM AB SERPL-ACNC: 14.3 SEC — HIGH (ref 10.6–13.6)
RBC # BLD: 3.13 M/UL — LOW (ref 4.2–5.8)
RBC # FLD: 15.5 % — HIGH (ref 10.3–14.5)
SODIUM SERPL-SCNC: 143 MMOL/L — SIGNIFICANT CHANGE UP (ref 135–145)
WBC # BLD: 8.1 K/UL — SIGNIFICANT CHANGE UP (ref 3.8–10.5)
WBC # FLD AUTO: 8.1 K/UL — SIGNIFICANT CHANGE UP (ref 3.8–10.5)

## 2020-11-09 PROCEDURE — 88305 TISSUE EXAM BY PATHOLOGIST: CPT | Mod: 26

## 2020-11-09 PROCEDURE — 88342 IMHCHEM/IMCYTCHM 1ST ANTB: CPT | Mod: 26

## 2020-11-09 PROCEDURE — 93985 DUP-SCAN HEMO COMPL BI STD: CPT | Mod: 26

## 2020-11-09 PROCEDURE — 99233 SBSQ HOSP IP/OBS HIGH 50: CPT

## 2020-11-09 PROCEDURE — 43239 EGD BIOPSY SINGLE/MULTIPLE: CPT

## 2020-11-09 PROCEDURE — 99232 SBSQ HOSP IP/OBS MODERATE 35: CPT

## 2020-11-09 PROCEDURE — 45378 DIAGNOSTIC COLONOSCOPY: CPT

## 2020-11-09 RX ORDER — CHLORHEXIDINE GLUCONATE 213 G/1000ML
15 SOLUTION TOPICAL
Refills: 0 | Status: DISCONTINUED | OUTPATIENT
Start: 2020-11-09 | End: 2020-11-13

## 2020-11-09 RX ORDER — VANCOMYCIN HCL 1 G
1000 VIAL (EA) INTRAVENOUS ONCE
Refills: 0 | Status: DISCONTINUED | OUTPATIENT
Start: 2020-11-13 | End: 2020-11-13

## 2020-11-09 RX ORDER — ALPRAZOLAM 0.25 MG
0.25 TABLET ORAL ONCE
Refills: 0 | Status: DISCONTINUED | OUTPATIENT
Start: 2020-11-09 | End: 2020-11-09

## 2020-11-09 RX ORDER — CEFUROXIME AXETIL 250 MG
1500 TABLET ORAL ONCE
Refills: 0 | Status: DISCONTINUED | OUTPATIENT
Start: 2020-11-13 | End: 2020-11-13

## 2020-11-09 RX ORDER — LANOLIN ALCOHOL/MO/W.PET/CERES
5 CREAM (GRAM) TOPICAL ONCE
Refills: 0 | Status: COMPLETED | OUTPATIENT
Start: 2020-11-09 | End: 2020-11-09

## 2020-11-09 RX ORDER — ALPRAZOLAM 0.25 MG
0.5 TABLET ORAL AT BEDTIME
Refills: 0 | Status: DISCONTINUED | OUTPATIENT
Start: 2020-11-09 | End: 2020-11-13

## 2020-11-09 RX ORDER — CHLORHEXIDINE GLUCONATE 213 G/1000ML
1 SOLUTION TOPICAL
Refills: 0 | Status: DISCONTINUED | OUTPATIENT
Start: 2020-11-09 | End: 2020-11-13

## 2020-11-09 RX ORDER — ALPRAZOLAM 0.25 MG
0.5 TABLET ORAL ONCE
Refills: 0 | Status: DISCONTINUED | OUTPATIENT
Start: 2020-11-09 | End: 2020-11-09

## 2020-11-09 RX ADMIN — Medication 650 MILLIGRAM(S): at 05:07

## 2020-11-09 RX ADMIN — Medication 75 MILLIGRAM(S): at 05:07

## 2020-11-09 RX ADMIN — ATORVASTATIN CALCIUM 40 MILLIGRAM(S): 80 TABLET, FILM COATED ORAL at 21:16

## 2020-11-09 RX ADMIN — Medication 81 MILLIGRAM(S): at 18:07

## 2020-11-09 RX ADMIN — Medication 0.5 MILLIGRAM(S): at 21:15

## 2020-11-09 RX ADMIN — CARVEDILOL PHOSPHATE 25 MILLIGRAM(S): 80 CAPSULE, EXTENDED RELEASE ORAL at 05:06

## 2020-11-09 RX ADMIN — Medication 0.25 MILLIGRAM(S): at 05:06

## 2020-11-09 RX ADMIN — Medication 1 MILLIGRAM(S): at 18:07

## 2020-11-09 RX ADMIN — LEVETIRACETAM 750 MILLIGRAM(S): 250 TABLET, FILM COATED ORAL at 18:07

## 2020-11-09 RX ADMIN — Medication 650 MILLIGRAM(S): at 21:16

## 2020-11-09 RX ADMIN — Medication 5 MILLIGRAM(S): at 00:53

## 2020-11-09 RX ADMIN — CARVEDILOL PHOSPHATE 25 MILLIGRAM(S): 80 CAPSULE, EXTENDED RELEASE ORAL at 18:07

## 2020-11-09 RX ADMIN — PANTOPRAZOLE SODIUM 40 MILLIGRAM(S): 20 TABLET, DELAYED RELEASE ORAL at 05:07

## 2020-11-09 RX ADMIN — Medication 75 MILLIGRAM(S): at 21:16

## 2020-11-09 RX ADMIN — SENNA PLUS 2 TABLET(S): 8.6 TABLET ORAL at 21:15

## 2020-11-09 RX ADMIN — LEVETIRACETAM 750 MILLIGRAM(S): 250 TABLET, FILM COATED ORAL at 05:06

## 2020-11-09 RX ADMIN — Medication 5 MILLIGRAM(S): at 21:16

## 2020-11-09 RX ADMIN — Medication 0.5 MILLIGRAM(S): at 23:45

## 2020-11-09 NOTE — PROGRESS NOTE ADULT - PROBLEM SELECTOR PLAN 1
CTA performed 11/6 see above   Nephrology following, on HD before cardiac surgery   plan for Dialysis 11/10 after cath  Cath scheduled 11/10 with Dr. Lewis  continue hydralazine, and carvedilol for HTN  f/u s/p EGD and colonoscopy to restart lovenox  Discussed plan with Dr. Butler

## 2020-11-09 NOTE — PROGRESS NOTE ADULT - ASSESSMENT
59 y/o M with PMH of CAD s/p stent in LAD, leaky aortic valve, uncontrolled bp, s/p aortic dissection repair 2010, CVA left sided residual weakness, s/p colostomy with reversal, presents from home with sob and LE edema. Echo indicating EF 55% moderate severe, MR, Moderate TR, mod-sever AI, grade II diastolic dysfunction. SANTOS/CKD receiving HD via right IJ permacath. Pt getting EGD and Coloscopy 11/9, 2/2 to anemia. Plan for Cath 11/10 with Dr. Lewis follnelidaed by HD as per renal. Plan discussed with Dr. Butler in am rounds

## 2020-11-09 NOTE — PROGRESS NOTE ADULT - ASSESSMENT
1) Martin   2) MBD of renal dx  3) Anemia of renal dx  4) Vol HTN    Started on HD  before cardiac surgery to optimize patient  Plan for HD on tuesday; then again on Wednesday post cath; and pre surg  continue REINALDO with HD  UF as tolerated.    dw [facility]

## 2020-11-09 NOTE — PROGRESS NOTE ADULT - SUBJECTIVE AND OBJECTIVE BOX
[facility]                                          [address] , [address], [place] [address]                                               PHONE: [phone]    FAX: [phone] [address] [facility], [address], [place] [address]                                       PHONE: [phone]    FAX: [phone]  *******************************************************************************    Overnight events/Subjective Assessment: Awaiting EGD/flex sig today.  No CP, palpitations, syncope.  Reports improvement in breathing after initiation of HD/fluid removal.    INTERPRETATION OF TELEMETRY (personally reviewed): SR with PVCs    penicillin (Unknown)    MEDICATIONS  (STANDING):  aspirin enteric coated 81 milliGRAM(s) Oral daily  atorvastatin 40 milliGRAM(s) Oral at bedtime  carvedilol 25 milliGRAM(s) Oral every 12 hours  epoetin yolanda-epbx (RETACRIT) Injectable 98647 Unit(s) SubCutaneous every 7 days  folic acid 1 milliGRAM(s) Oral daily  hydrALAZINE 75 milliGRAM(s) Oral three times a day  influenza   Vaccine 0.5 milliLiter(s) IntraMuscular once  iron sucrose IVPB 200 milliGRAM(s) IV Intermittent every 48 hours  levETIRAcetam 750 milliGRAM(s) Oral two times a day  melatonin 5 milliGRAM(s) Oral at bedtime  pantoprazole    Tablet 40 milliGRAM(s) Oral before breakfast  senna 2 Tablet(s) Oral at bedtime  sodium bicarbonate 650 milliGRAM(s) Oral three times a day    MEDICATIONS  (PRN):  albuterol/ipratropium for Nebulization 3 milliLiter(s) Nebulizer every 6 hours PRN Bronchospasm  nitroglycerin     SubLingual 0.4 milliGRAM(s) SubLingual every 5 minutes PRN Chest Pain      Vital Signs Last 24 Hrs  T(C): 36.3 (09 Nov 2020 09:33), Max: 36.6 (08 Nov 2020 22:07)  T(F): 97.4 (09 Nov 2020 09:33), Max: 97.9 (08 Nov 2020 22:07)  HR: 70 (09 Nov 2020 14:02) (70 - 91)  BP: 148/63 (09 Nov 2020 14:02) (121/61 - 148/67)  BP(mean): --  RR: 18 (09 Nov 2020 09:33) (16 - 20)  SpO2: 98% (09 Nov 2020 09:33) (96% - 99%)    I&O's Detail    08 Nov 2020 07:01  -  09 Nov 2020 07:00  --------------------------------------------------------  IN:    IV PiggyBack: 50 mL    Oral Fluid: 1600 mL  Total IN: 1650 mL    OUT:    Voided (mL): 800 mL  Total OUT: 800 mL    Total NET: 850 mL        I&O's Summary    08 Nov 2020 07:01  -  09 Nov 2020 07:00  --------------------------------------------------------  IN: 1650 mL / OUT: 800 mL / NET: 850 mL            PHYSICAL EXAM:  General: Appears well developed, well nourished, no acute distress  HEENT: Head: normocephalic, atraumatic  Eyes: Pupils equal and reactive  Neck: Supple, no carotid bruit, no JVD, no HJR  CARDIOVASCULAR: Normal S1 and S2, 2/6 systolic murmur at base and apex  LUNGS: Clear to auscultation bilaterally, no rales, rhonchi or wheeze  ABDOMEN: Soft, nontender, non-distended, positive bowel sounds, no mass or bruit  EXTREMITIES: trace b/l LE edema, distal pulses WNL  SKIN: Warm and dry with normal turgor  NEURO: Alert & oriented x 3, grossly intact  PSYCH: normal mood and affect          LABS:                        9.2    8.10  )-----------( 235      ( 09 Nov 2020 06:45 )             29.4     11-09    143  |  106  |  47.0<H>  ----------------------------<  93  3.8   |  22.0  |  2.66<H>    Ca    8.5<L>      09 Nov 2020 06:45  Mg     2.4     11-09          PT/INR - ( 09 Nov 2020 06:45 )   PT: 14.3 sec;   INR: 1.25 ratio           Serum Pro-Brain Natriuretic Peptide: 64090 pg/mL (11-05 @ 01:31)  serum  Lipids:     Thyroid Stimulating Hormone, Serum: 1.95 uIU/mL (11-03 @ 08:18)      RADIOLOGY & ADDITIONAL STUDIES:    INTERPRETATION OF TELEMETRY (personally reviewed): SR in 70s with PVCs    < from: US Duplex Carotid Arteries Complete, Bilateral (11.05.20 @ 16:51) >  IMPRESSION:    Moderate plaque in the right carotid bulb with elevated peak systolic and end-diastolic velocities which overall are suggestive of a 50-69% stenosis as described above, however given the peak systolic velocity is suggestive of a greater than 70% stenosis, aCTA of the neck is recommended for further evaluation.    Mild to moderate plaque in the left carotid bulb with less than 50% left internal carotid artery stenosis.      < end of copied text >    ECHO:  < from: TTE Echo Complete w/ Contrast w/ Doppler (11.04.20 @ 08:37) >    PHYSICIAN INTERPRETATION:  Left Ventricle: Left ventricular wall thickness is moderately increased.  Global LV systolic function was mildly decreased. Left ventricular ejection fraction, by visual estimation, is 50 to 55%. Spectral Doppler shows pseudonormal pattern of left ventricular myocardial filling (Grade II diastolic dysfunction).  Right Ventricle: The right ventricular size is normal. RV systolic function is mildly reduced.  Left Atrium: Severely enlarged left atrium.  Right Atrium: Mildly enlarged right atrium.  Pericardium: Trivial pericardial effusion is present. The pericardial effusion is posterior to the left ventricle.  Mitral Valve: Thickening of the anterior and posterior mitral valve leaflets. There is mild mitral annular calcification. Moderate to severe mitral valve regurgitation is seen.  Tricuspid Valve: Moderate tricuspid regurgitation is visualized. Estimated pulmonary artery systolic pressure is 43.8 mmHg assuming a right atrial pressure of 8 mmHg, which is consistent with mild pulmonary hypertension.  Aortic Valve: Peak transaortic gradient equals 4.4 mmHg, mean transaortic gradient equals 2.3 mmHg, the calculated aortic valve area equals 3.18 cm² by the continuity equation consistent with normally opening aortic valve. Moderate to severe aortic valve regurgitation is seen.  Pulmonic Valve: Mild pulmonic valve regurgitation.  Aorta: There is dilatation of the aortic root. Aortic Root measuring 4.2 cm.  Pulmonary Artery: The main pulmonary artery is normal in size.  Venous: The inferior vena cava was dilated, with respiratory size variation greater than 50%.      Summary:   1. Left ventricular ejection fraction, by visual estimation, is 50 to 55%.   2. Mildly decreased global left ventricular systolic function.   3. Severely enlarged left atrium.   4. Moderately increased LV wall thickness.   5. Spectral Doppler shows pseudonormal pattern of left ventricular myocardial filling (Grade II diastolic dysfunction).   6. Mildly reduced RV systolic function.   7. Mildly enlarged right atrium.   8. Mild mitral annular calcification.   9. Moderate to severe mitral valve regurgitation.  10. Thickening of the anterior and posterior mitral valve leaflets.  11. Moderate tricuspid regurgitation.  12. Moderate to severe aortic regurgitation.  13. Mild pulmonic valve regurgitation.  14. Dilatation of the aortic root.  15. Aortic Root measuring 4.2 cm.  16. Estimated pulmonary artery systolic pressure is 43.8 mmHg assuming a right atrial pressure of 8 mmHg, which is consistent with mild pulmonary hypertension.        ASSESSMENT AND PLAN:  In summary, JONATHAN GIBSON is a 60y man with HTN, HLD, CAD s/p remote stent (most recent nuclear stress test 11/2019 showing infarct without ischemia), ascending aortic dissection s/p repair (2001), chronic diastolic HFpEF, moderate valvular heart disease including moderate aortic and mitral regurgitation, admitted for evaluation of shortness of breath, found to have new anemia and SANTOS in the setting of markedly elevated BP, incidentally found to have mildly elevated troponin.      - The patient has been chest pain free since admission.  Mildly elevated initial troponin is nonspecific, especially in the setting of SANTOS.  Nothing to suggest acute MI.  Would repeat ECG should symptoms recur.   - Echocardiogram 11/4/20 EF 50-55%, grade II diastolic dysfunction, mildly reduced RV systolic function, severe LAE, mild KOLTON, mod-severe MR and AI, mod TR, dilated aortic root @ 4.2cm  - Mild HF symptoms (acute on chronic diastolic/valvular HF) on admission improving following IV lasix.  Renal function worsened with diuresis and is improving with discontinuation of diuretic.  Further improvement noted with initiation of HD and fluid removal.  - Rhythm/hemodynamics stable: BP markedly elevated on admission now improved following restarting of PO regimen.  Continue Coreg 25mg BID and Hydralazine 75mg TID; titrate prn.  Avoid nephrotoxic agents  - The patient has been evaluated by Dr. Butler of [facility].  He will likely benefit from valve surgery which is tentatively scheduled for later this week.  - CTA of the chest reviewed.  He has stable residual type B aortic dissection extending to the abdominal aorta, left subclavian artery and innominate artery.  Significant coronary artery calcification seen.  - Acute anemia, worsening: symptoms of progressive shortness of breath are at least in part related to anemia which continues to progress.  He is laying flat and has no evidence of decompensated HF.  PVCs and short 3-4 beats of NSVT do not constitute significant arrhythmia.  He has regurgitant valvular heart disease which is not a contraindication to procedures/surgery.   There is nothing to suggest active ischemia.  Therefore there is no absolute cardiac contraindication to proceeding with the planned GI procedures, scheduled for later today.  Given his comorbidities, he does remain at increased risk of CV events with any procedure/surgery.  - ASA 81mg daily needs to be continued in an uninterrupted fashion given CAD with prior coronary stents.  - HLD: continue lipitor 40   - Carotid US 11/5/20 Moderate, possibly > 70% VANESSA stenosis, < 50% LICA stenosis.  Continue ASA and statin.  Consider CTA of the carotids.  - Keep K > 4, Mg > 2  - Will need R/LHC ahead of surgery.  Will plan for cath tomorrow AM ~ 7:30.  Please keep NPO after midnight and hold AC.  Will get HD after contrast load.  - Discussed with CTS PA and will discuss with Dr. Luke Stockton MD

## 2020-11-09 NOTE — PROGRESS NOTE ADULT - SUBJECTIVE AND OBJECTIVE BOX
Brief summary:  60y Male in bed with no complaints awaiting endoscopy and colonscopy  Patient denies acute pain with radiating or aggravating factors.  He denies chest pain, shortness of breath, palpitations, headache, dizziness, nausea, or vomiting.    NO Overnight events        PAST MEDICAL & SURGICAL HISTORY:  CVA (cerebral vascular accident)    CHF (congestive heart failure)    H/O colectomy    Aorta disorder          albuterol/ipratropium for Nebulization 3 milliLiter(s) Nebulizer every 6 hours PRN  aspirin enteric coated 81 milliGRAM(s) Oral daily  atorvastatin 40 milliGRAM(s) Oral at bedtime  carvedilol 25 milliGRAM(s) Oral every 12 hours  epoetin yolanda-epbx (RETACRIT) Injectable 38641 Unit(s) SubCutaneous every 7 days  folic acid 1 milliGRAM(s) Oral daily  hydrALAZINE 75 milliGRAM(s) Oral three times a day  influenza   Vaccine 0.5 milliLiter(s) IntraMuscular once  iron sucrose IVPB 200 milliGRAM(s) IV Intermittent every 48 hours  levETIRAcetam 750 milliGRAM(s) Oral two times a day  melatonin 5 milliGRAM(s) Oral at bedtime  nitroglycerin     SubLingual 0.4 milliGRAM(s) SubLingual every 5 minutes PRN  pantoprazole    Tablet 40 milliGRAM(s) Oral before breakfast  senna 2 Tablet(s) Oral at bedtime  sodium bicarbonate 650 milliGRAM(s) Oral three times a day  MEDICATIONS  (PRN):  albuterol/ipratropium for Nebulization 3 milliLiter(s) Nebulizer every 6 hours PRN Bronchospasm  nitroglycerin     SubLingual 0.4 milliGRAM(s) SubLingual every 5 minutes PRN Chest Pain      Daily     Daily Weight in k.5 (2020 05:39)                              9.2    8.10  )-----------( 235      ( 2020 06:45 )             29.4       143  |  106  |  47.0<H>  ----------------------------<  93  3.8   |  22.0  |  2.66<H>    Ca    8.5<L>      2020 06:45  Mg     2.4             PT/INR - ( 2020 06:45 )   PT: 14.3 sec;   INR: 1.25 ratio               Objective:  T(C): 36.3 (20 @ 09:33), Max: 36.6 (20 @ 22:07)  HR: 70 (20 @ 14:02) (70 - 91)  BP: 148/63 (20 @ 14:02) (121/61 - 148/67)  RR: 18 (20 @ 09:33) (16 - 20)  SpO2: 98% (20 @ 09:33) (96% - 99%)  Wt(kg): --CAPILLARY BLOOD GLUCOSE      I&O's Summary    2020 07:01  -  2020 07:00  --------------------------------------------------------  IN: 1650 mL / OUT: 800 mL / NET: 850 mL        Physical Exam  Neuro: A+O x 3, non-focal, speech clear and intact, left UE and LE weakness (HX CVA)  Pulm: CTA, equal bilaterally  CV: +S1S2. no murmurs  Abd: soft, NT, ND, +BS  Ext: +DP Pulses b/l, +PT pulses, no edema  Lines right IJ permacath. dressing CDI    Imaging:  Echo    < from: TTE Echo Complete w/ Contrast w/ Doppler (20 @ 08:37) >  Summary:   1. Left ventricular ejection fraction, by visual estimation, is 50 to 55%.   2. Mildly decreased global left ventricular systolic function.   3. Severely enlarged left atrium.   4. Moderately increased LV wall thickness.   5. Spectral Doppler shows pseudonormal pattern of left ventricular myocardial filling (Grade II diastolic dysfunction).   6. Mildly reduced RV systolic function.   7. Mildly enlarged right atrium.   8. Mild mitral annular calcification.   9. Moderate to severe mitral valve regurgitation.  10. Thickening of the anterior and posterior mitral valve leaflets.  11. Moderate tricuspid regurgitation.  12. Moderate to severe aortic regurgitation.  13. Mild pulmonic valve regurgitation.  14. Dilatation of the aortic root.  15. Aortic Root measuring 4.2 cm.  16. Estimated pulmonary artery systolic pressure is 43.8 mmHg assuming a right atrial pressure of 8 mmHg, which is consistent with mild pulmonary hypertension.    MD Attila Electronically signed on 2020 at 1:38:43 PM    < end of copied text >    < from: US Duplex Carotid Arteries Complete, Bilateral (20 @ 16:51) >  IMPRESSION:    Moderate plaque in the right carotid bulb with elevated peak systolic and end-diastolic velocities which overall are suggestive of a 50-69% stenosis as described above, however given the peak systolic velocity is suggestive of a greater than 70% stenosis, aCTA of the neck is recommended for further evaluation.    Mild to moderate plaque in the left carotid bulb with less than 50% left internal carotid artery stenosis.    Arrhythmia.    Measurement of carotid stenosis is based on velocity parameters that correlate the residual internal carotid diameter with that of the more distal vessel in accordance with a method such as the [place] [employer] Symptomatic Carotid Endarterectomy Trial (NASCET).                < end of copied text >    < from: CT Angio Chest w/ IV Cont (20 @ 15:56) >  HEART: Normal heart size. No pericardial effusion. Coronary artery calcifications are present.    MEDIASTINUM AND KAYLA: No hematoma. Mild adenopathy    LUNGS, AIRWAYS: The central airways are patent. Pulmonary edema.    PLEURA: Small bilateral effusions.    UPPER ABDOMEN: Gallstone with gallbladder wall thickening and pericholecystic edema.    BONES AND SOFT TISSUES: No acute bony abnormality. Status post sternotomy.    IMPRESSION:    Status post interposition graft repair of the ascending thoracic aorta with stable aortic measurements as above. Stable aortic dissection flap without evidence for thoracic or upper abdominal aortic branch occlusion.    Gallstone with gallbladder wall thickening and pericholecystic edema. Correlate for acute cholecystitis.    Mild pulmonary edema and small bilateral pleural effusions.      < end of copied text >

## 2020-11-09 NOTE — PROGRESS NOTE ADULT - SUBJECTIVE AND OBJECTIVE BOX
[facility] DIVISION OF KIDNEY DISEASES AND HYPERTENSION -- FOLLOW UP NOTE  --------------------------------------------------------------------------------  Chief Complaint: ESRD HD    24 hour events/subjective:  Seen/examined this AM  Pt going for open aortic/mitral on Thursday  Plan for HD tomorrow; likely again on Wed before surgery to optimize;      PAST HISTORY  --------------------------------------------------------------------------------  No significant changes to PMH, PSH, FHx, SHx, unless otherwise noted    ALLERGIES & MEDICATIONS  --------------------------------------------------------------------------------  Allergies    penicillin (Unknown)    Intolerances      Standing Inpatient Medications  aspirin enteric coated 81 milliGRAM(s) Oral daily  atorvastatin 40 milliGRAM(s) Oral at bedtime  carvedilol 25 milliGRAM(s) Oral every 12 hours  epoetin yolanda-epbx (RETACRIT) Injectable 27547 Unit(s) SubCutaneous every 7 days  folic acid 1 milliGRAM(s) Oral daily  hydrALAZINE 75 milliGRAM(s) Oral three times a day  influenza   Vaccine 0.5 milliLiter(s) IntraMuscular once  iron sucrose IVPB 200 milliGRAM(s) IV Intermittent every 48 hours  levETIRAcetam 750 milliGRAM(s) Oral two times a day  melatonin 5 milliGRAM(s) Oral at bedtime  pantoprazole    Tablet 40 milliGRAM(s) Oral before breakfast  senna 2 Tablet(s) Oral at bedtime  sodium bicarbonate 650 milliGRAM(s) Oral three times a day    PRN Inpatient Medications  albuterol/ipratropium for Nebulization 3 milliLiter(s) Nebulizer every 6 hours PRN  nitroglycerin     SubLingual 0.4 milliGRAM(s) SubLingual every 5 minutes PRN      REVIEW OF SYSTEMS  --------------------------------------------------------------------------------  Gen: No weight changes, fatigue, fevers/chills, weakness  Skin: No rashes  Head/Eyes/Ears/Mouth: No headache; Normal hearing; Normal vision w/o blurriness; No sinus pain/discomfort, sore throat  Respiratory: No dyspnea, cough, wheezing, hemoptysis  CV: No chest pain, PND, orthopnea  GI: No abdominal pain, diarrhea, constipation, nausea, vomiting, melena, hematochezia  : No increased frequency, dysuria, hematuria, nocturia  MSK: No joint pain/swelling; no back pain; no edema  Neuro: No dizziness/lightheadedness, weakness, seizures, numbness, tingling  Heme: No easy bruising or bleeding  Endo: No heat/cold intolerance  Psych: No significant nervousness, anxiety, stress, depression    All other systems were reviewed and are negative, except as noted.    VITALS/PHYSICAL EXAM  --------------------------------------------------------------------------------  T(C): 36.3 (11-09-20 @ 09:33), Max: 36.6 (11-08-20 @ 22:07)  HR: 86 (11-09-20 @ 09:33) (75 - 91)  BP: 148/67 (11-09-20 @ 09:33) (121/61 - 148/67)  RR: 18 (11-09-20 @ 09:33) (16 - 20)  SpO2: 98% (11-09-20 @ 09:33) (96% - 99%)  Wt(kg): --        11-08-20 @ 07:01  -  11-09-20 @ 07:00  --------------------------------------------------------  IN: 1650 mL / OUT: 800 mL / NET: 850 mL      Physical Exam:  	Gen: NAD,   	HEENT: PERRL, supple neck, clear oropharynx  	Pulm: CTA B/L  	CV: RRR, S1S2; no rub  	Back: No spinal or CVA tenderness; no sacral edema  	Abd: +BS, soft, nontender/nondistended  	: No suprapubic tenderness  	UE: Warm, FROM, no clubbing, intact strength; no edema; no asterixis  	LE: Warm, FROM, no clubbing, intact strength; no edema  	Neuro: No focal deficits, intact gait  	Psych: Normal affect and mood  	Skin: Warm, without rashes  	Vascular access:    LABS/STUDIES  --------------------------------------------------------------------------------              9.2    8.10  >-----------<  235      [11-09-20 @ 06:45]              29.4     143  |  106  |  47.0  ----------------------------<  93      [11-09-20 @ 06:45]  3.8   |  22.0  |  2.66        Ca     8.5     [11-09-20 @ 06:45]      Mg     2.4     [11-09-20 @ 06:45]      PT/INR: PT 14.3 , INR 1.25       [11-09-20 @ 06:45]      Creatinine Trend:  SCr 2.66 [11-09 @ 06:45]  SCr 2.70 [11-08 @ 21:52]  SCr 2.53 [11-08 @ 06:23]  SCr 2.82 [11-07 @ 05:47]  SCr 3.06 [11-06 @ 07:18]        Iron 29, TIBC 332, %sat 9      [11-03-20 @ 08:18]  Ferritin 230      [11-03-20 @ 08:18]  TSH 1.95      [11-03-20 @ 08:18]  Lipid: chol 154, , HDL 44, LDL --      [11-03-20 @ 08:18]    HBsAb <3.0      [11-07-20 @ 05:41]  HBsAg Nonreact      [11-07-20 @ 05:41]  HBcAb Nonreact      [11-07-20 @ 05:41]  HCV 0.07, Nonreact      [11-07-20 @ 05:41]

## 2020-11-09 NOTE — PROGRESS NOTE ADULT - SUBJECTIVE AND OBJECTIVE BOX
JONATHAN GIBSON  ----------------------------------------  The patient was seen and evaluated for heart failure. Offers no complaints except insomnia. Denied chest pain or dyspnea.    Vital Signs Last 24 Hrs  T(C): 36.3 (09 Nov 2020 09:33), Max: 36.6 (08 Nov 2020 22:07)  T(F): 97.4 (09 Nov 2020 09:33), Max: 97.9 (08 Nov 2020 22:07)  HR: 86 (09 Nov 2020 09:33) (72 - 91)  BP: 148/67 (09 Nov 2020 09:33) (116/61 - 148/67)  BP(mean): --  RR: 18 (09 Nov 2020 09:33) (16 - 20)  SpO2: 98% (09 Nov 2020 09:33) (96% - 99%)    PHYSICAL EXAMINATION:  ----------------------------------------  General appearance: No acute distress, Awake, Alert  HEENT: Normocephalic, Atraumatic, Conjunctiva clear, EOMI  Neck: Supple, No JVD, No tenderness  Lungs: Breath sound equal bilaterally, No wheezes, No rales  Cardiovascular: S1S2, Regular rhythm  Abdomen: Soft, Nontender, Nondistended, No guarding/rebound, Positive bowel sounds  Extremities: No clubbing, No cyanosis, No calf tenderness, Trace lower extremity edema  Neuro: Strength equal bilaterally, No tremors  Psychiatric: Appropriate mood, Normal affect    LABORATORY STUDIES:  ----------------------------------------             9.2    8.10  )-----------( 235      ( 09 Nov 2020 06:45 )             29.4     11-09    143  |  106  |  47.0<H>  ----------------------------<  93  3.8   |  22.0  |  2.66<H>    Ca    8.5<L>      09 Nov 2020 06:45  Mg     2.4     11-09    PT/INR - ( 09 Nov 2020 06:45 )   PT: 14.3 sec;   INR: 1.25 ratio      MEDICATIONS  (STANDING):  aspirin enteric coated 81 milliGRAM(s) Oral daily  atorvastatin 40 milliGRAM(s) Oral at bedtime  carvedilol 25 milliGRAM(s) Oral every 12 hours  epoetin yolanda-epbx (RETACRIT) Injectable 89031 Unit(s) SubCutaneous every 7 days  folic acid 1 milliGRAM(s) Oral daily  hydrALAZINE 75 milliGRAM(s) Oral three times a day  influenza   Vaccine 0.5 milliLiter(s) IntraMuscular once  iron sucrose IVPB 200 milliGRAM(s) IV Intermittent every 48 hours  levETIRAcetam 750 milliGRAM(s) Oral two times a day  melatonin 5 milliGRAM(s) Oral at bedtime  pantoprazole    Tablet 40 milliGRAM(s) Oral before breakfast  senna 2 Tablet(s) Oral at bedtime  sodium bicarbonate 650 milliGRAM(s) Oral three times a day    MEDICATIONS  (PRN):  albuterol/ipratropium for Nebulization 3 milliLiter(s) Nebulizer every 6 hours PRN Bronchospasm  hydrALAZINE Injectable 10 milliGRAM(s) IV Push every 6 hours PRN for sbp above 160 mmhg  nitroglycerin     SubLingual 0.4 milliGRAM(s) SubLingual every 5 minutes PRN Chest Pain      ASSESSMENT / PLAN:  ----------------------------------------  59 y/o M with PMH of CAD s/p stent in LAD, leaky aortic valve, uncontrolled bp, thoracic aortic dissection requiring emergent surgery, CVA with left sided sensory loss, colostomy for bowel ischemia s/p reversal, psoriasis on Humira injections presents from home with shortness of breath.     Acute on chronic diastolic heart failure - On carvedilol and hydralazine. No orthopnea. Aortic regurgitation and mitral regurgitation noted on echocardiogram. Discussed with Cardiothoracic Surgery, work up in progress for surgical intervention planned for later this week.    Anemia - Status post prior transfusion of packed red blood cells. Blood count improved. On iron sucrose and erythropoietin. Gastroenterology consultation noted, planned for upper endoscopy and flexible sigmoidoscopy today.    Coronary artery disease - Planned for eventual cardiac catheterization. On aspirin, atorvastatin, and carvedilol.    Acute kidney injury - Hemodialysis as per Nephrology. Renal ultrasound was without hydronephrosis. Hypokalemia resolved with potassium supplementation.    History of stroke - On aspirin, atorvastatin, and levetiracetam.

## 2020-11-10 DIAGNOSIS — I10 ESSENTIAL (PRIMARY) HYPERTENSION: ICD-10-CM

## 2020-11-10 LAB
ALBUMIN SERPL ELPH-MCNC: 2.8 G/DL — LOW (ref 3.3–5.2)
ALP SERPL-CCNC: 55 U/L — SIGNIFICANT CHANGE UP (ref 40–120)
ALT FLD-CCNC: 12 U/L — SIGNIFICANT CHANGE UP
ANION GAP SERPL CALC-SCNC: 12 MMOL/L — SIGNIFICANT CHANGE UP (ref 5–17)
AST SERPL-CCNC: 14 U/L — SIGNIFICANT CHANGE UP
BILIRUB DIRECT SERPL-MCNC: 0.1 MG/DL — SIGNIFICANT CHANGE UP (ref 0–0.3)
BILIRUB INDIRECT FLD-MCNC: 0.2 MG/DL — SIGNIFICANT CHANGE UP (ref 0.2–1)
BILIRUB SERPL-MCNC: 0.4 MG/DL — SIGNIFICANT CHANGE UP (ref 0.4–2)
BLD GP AB SCN SERPL QL: SIGNIFICANT CHANGE UP
BUN SERPL-MCNC: 47 MG/DL — HIGH (ref 8–20)
CALCIUM SERPL-MCNC: 8.6 MG/DL — SIGNIFICANT CHANGE UP (ref 8.6–10.2)
CHLORIDE SERPL-SCNC: 105 MMOL/L — SIGNIFICANT CHANGE UP (ref 98–107)
CO2 SERPL-SCNC: 24 MMOL/L — SIGNIFICANT CHANGE UP (ref 22–29)
CREAT SERPL-MCNC: 2.68 MG/DL — HIGH (ref 0.5–1.3)
GLUCOSE SERPL-MCNC: 102 MG/DL — HIGH (ref 70–99)
HCT VFR BLD CALC: 28.6 % — LOW (ref 39–50)
HGB BLD-MCNC: 8.7 G/DL — LOW (ref 13–17)
MAGNESIUM SERPL-MCNC: 2.2 MG/DL — SIGNIFICANT CHANGE UP (ref 1.6–2.6)
MCHC RBC-ENTMCNC: 28.7 PG — SIGNIFICANT CHANGE UP (ref 27–34)
MCHC RBC-ENTMCNC: 30.4 GM/DL — LOW (ref 32–36)
MCV RBC AUTO: 94.4 FL — SIGNIFICANT CHANGE UP (ref 80–100)
PLATELET # BLD AUTO: 217 K/UL — SIGNIFICANT CHANGE UP (ref 150–400)
POTASSIUM SERPL-MCNC: 3.4 MMOL/L — LOW (ref 3.5–5.3)
POTASSIUM SERPL-SCNC: 3.4 MMOL/L — LOW (ref 3.5–5.3)
PROT SERPL-MCNC: 5.6 G/DL — LOW (ref 6.6–8.7)
RBC # BLD: 3.03 M/UL — LOW (ref 4.2–5.8)
RBC # FLD: 15.6 % — HIGH (ref 10.3–14.5)
SODIUM SERPL-SCNC: 141 MMOL/L — SIGNIFICANT CHANGE UP (ref 135–145)
WBC # BLD: 8.38 K/UL — SIGNIFICANT CHANGE UP (ref 3.8–10.5)
WBC # FLD AUTO: 8.38 K/UL — SIGNIFICANT CHANGE UP (ref 3.8–10.5)

## 2020-11-10 PROCEDURE — 99232 SBSQ HOSP IP/OBS MODERATE 35: CPT

## 2020-11-10 PROCEDURE — 99233 SBSQ HOSP IP/OBS HIGH 50: CPT

## 2020-11-10 RX ORDER — PHYTONADIONE (VIT K1) 5 MG
5 TABLET ORAL DAILY
Refills: 0 | Status: DISCONTINUED | OUTPATIENT
Start: 2020-11-10 | End: 2020-11-13

## 2020-11-10 RX ORDER — POTASSIUM CHLORIDE 20 MEQ
40 PACKET (EA) ORAL EVERY 4 HOURS
Refills: 0 | Status: DISCONTINUED | OUTPATIENT
Start: 2020-11-10 | End: 2020-11-10

## 2020-11-10 RX ORDER — DIPHENHYDRAMINE HCL 50 MG
25 CAPSULE ORAL ONCE
Refills: 0 | Status: DISCONTINUED | OUTPATIENT
Start: 2020-11-10 | End: 2020-11-10

## 2020-11-10 RX ORDER — FENTANYL CITRATE 50 UG/ML
25 INJECTION INTRAVENOUS ONCE
Refills: 0 | Status: DISCONTINUED | OUTPATIENT
Start: 2020-11-10 | End: 2020-11-10

## 2020-11-10 RX ORDER — POTASSIUM CHLORIDE 20 MEQ
40 PACKET (EA) ORAL ONCE
Refills: 0 | Status: COMPLETED | OUTPATIENT
Start: 2020-11-10 | End: 2020-11-10

## 2020-11-10 RX ADMIN — Medication 40 MILLIEQUIVALENT(S): at 07:38

## 2020-11-10 RX ADMIN — Medication 650 MILLIGRAM(S): at 06:13

## 2020-11-10 RX ADMIN — LEVETIRACETAM 750 MILLIGRAM(S): 250 TABLET, FILM COATED ORAL at 17:24

## 2020-11-10 RX ADMIN — Medication 75 MILLIGRAM(S): at 06:13

## 2020-11-10 RX ADMIN — FENTANYL CITRATE 25 MICROGRAM(S): 50 INJECTION INTRAVENOUS at 10:00

## 2020-11-10 RX ADMIN — Medication 1 MILLIGRAM(S): at 13:06

## 2020-11-10 RX ADMIN — SENNA PLUS 2 TABLET(S): 8.6 TABLET ORAL at 22:15

## 2020-11-10 RX ADMIN — ATORVASTATIN CALCIUM 40 MILLIGRAM(S): 80 TABLET, FILM COATED ORAL at 22:15

## 2020-11-10 RX ADMIN — Medication 0.5 MILLIGRAM(S): at 22:16

## 2020-11-10 RX ADMIN — Medication 5 MILLIGRAM(S): at 22:16

## 2020-11-10 RX ADMIN — Medication 650 MILLIGRAM(S): at 22:16

## 2020-11-10 RX ADMIN — Medication 75 MILLIGRAM(S): at 13:05

## 2020-11-10 RX ADMIN — CARVEDILOL PHOSPHATE 25 MILLIGRAM(S): 80 CAPSULE, EXTENDED RELEASE ORAL at 06:13

## 2020-11-10 RX ADMIN — Medication 81 MILLIGRAM(S): at 06:29

## 2020-11-10 RX ADMIN — LEVETIRACETAM 750 MILLIGRAM(S): 250 TABLET, FILM COATED ORAL at 06:13

## 2020-11-10 RX ADMIN — CHLORHEXIDINE GLUCONATE 15 MILLILITER(S): 213 SOLUTION TOPICAL at 17:23

## 2020-11-10 RX ADMIN — IRON SUCROSE 110 MILLIGRAM(S): 20 INJECTION, SOLUTION INTRAVENOUS at 13:06

## 2020-11-10 RX ADMIN — Medication 75 MILLIGRAM(S): at 22:15

## 2020-11-10 RX ADMIN — Medication 650 MILLIGRAM(S): at 13:06

## 2020-11-10 RX ADMIN — Medication 5 MILLIGRAM(S): at 13:05

## 2020-11-10 RX ADMIN — PANTOPRAZOLE SODIUM 40 MILLIGRAM(S): 20 TABLET, DELAYED RELEASE ORAL at 06:13

## 2020-11-10 RX ADMIN — CHLORHEXIDINE GLUCONATE 1 APPLICATION(S): 213 SOLUTION TOPICAL at 17:23

## 2020-11-10 NOTE — PROGRESS NOTE ADULT - SUBJECTIVE AND OBJECTIVE BOX
Chief Complaint: This is a 60y old man patient being seen in follow-up consultation for anemia.    HPI / 24H events:  Patient underwent EGD and pouchoscopy yesterday.  No significant findings to account for anemia.  Has no GI complaints today.    ROS: A 14-point review of systems was reviewed and was otherwise negative save what was reported in the HPI.    PAST MEDICAL/SURGICAL HISTORY:  CVA (cerebral vascular accident)  CHF (congestive heart failure)  H/O colectomy  Aorta disorder    MEDICATIONS  (STANDING):  ALPRAZolam 0.5 milliGRAM(s) Oral at bedtime  aspirin enteric coated 81 milliGRAM(s) Oral daily  atorvastatin 40 milliGRAM(s) Oral at bedtime  carvedilol 25 milliGRAM(s) Oral every 12 hours  chlorhexidine 0.12% Liquid 15 milliLiter(s) Swish and Spit two times a day  chlorhexidine 4% Liquid 1 Application(s) Topical two times a day  epoetin yolanda-epbx (RETACRIT) Injectable 94018 Unit(s) SubCutaneous every 7 days  folic acid 1 milliGRAM(s) Oral daily  hydrALAZINE 75 milliGRAM(s) Oral three times a day  influenza   Vaccine 0.5 milliLiter(s) IntraMuscular once  iron sucrose IVPB 200 milliGRAM(s) IV Intermittent every 48 hours  levETIRAcetam 750 milliGRAM(s) Oral two times a day  melatonin 5 milliGRAM(s) Oral at bedtime  pantoprazole    Tablet 40 milliGRAM(s) Oral before breakfast  phytonadione   Solution 5 milliGRAM(s) Oral daily  senna 2 Tablet(s) Oral at bedtime  sodium bicarbonate 650 milliGRAM(s) Oral three times a day    MEDICATIONS  (PRN):  albuterol/ipratropium for Nebulization 3 milliLiter(s) Nebulizer every 6 hours PRN Bronchospasm  nitroglycerin     SubLingual 0.4 milliGRAM(s) SubLingual every 5 minutes PRN Chest Pain    penicillin (Unknown)    T(C): 36.4 (11-10-20 @ 11:13), Max: 36.6 (11-09-20 @ 20:42)  HR: 68 (11-10-20 @ 11:30) (64 - 81)  BP: 148/68 (11-10-20 @ 11:30) (110/65 - 175/72)  RR: 14 (11-10-20 @ 11:30) (14 - 19)  SpO2: 95% (11-10-20 @ 11:30) (95% - 100%)    I&O's Summary    09 Nov 2020 07:01  -  10 Nov 2020 07:00  --------------------------------------------------------  IN: 240 mL / OUT: 0 mL / NET: 240 mL      PHYSICAL EXAM:  Constitutional: Well-developed, well-nourished, in no apparent distress  Eyes: Sclerae anicteric, conjunctivae normal  ENMT: Mucus membranes moist, no oropharyngeal thrush noted  Neck: No thyroid nodules appreciated, no significant cervical or supraclavicular lymphadenopathy  Respiratory: Breathing nonlabored; clear to auscultation  Cardiovascular: Regular rate and rhythm  Gastrointestinal: Soft, nontender, nondistended, normoactive bowel sounds; no hepatosplenomegaly appreciated; no rebound tenderness or involuntary guarding  Neurological: Alert and oriented to person, place and time; no asterixis  Skin: No jaundice  Lymph Nodes: No significant lymphadenopathy  Musculoskeletal: No significant peripheral atrophy  Psychiatric: Affect and mood appropriate                   8.7    8.38  )-----------( 217      ( 11-10 @ 06:10 )             28.6                9.2    8.10  )-----------( 235      ( 11-09 @ 06:45 )             29.4                8.6    8.31  )-----------( 214      ( 11-08 @ 06:23 )             27.4       11-10    141  |  105  |  47.0<H>  ----------------------------<  102<H>  3.4<L>   |  24.0  |  2.68<H>    Ca    8.6      10 Nov 2020 06:10  Mg     2.2     11-10    TPro  5.6<L>  /  Alb  2.8<L>  /  TBili  0.4  /  DBili  0.1  /  AST  14  /  ALT  12  /  AlkPhos  55  11-10    LIVER FUNCTIONS - ( 10 Nov 2020 06:10 )  Alb: 2.8 g/dL / Pro: 5.6 g/dL / ALK PHOS: 55 U/L / ALT: 12 U/L / AST: 14 U/L / GGT: x               PT/INR - ( 09 Nov 2020 06:45 )   PT: 14.3 sec;   INR: 1.25 ratio

## 2020-11-10 NOTE — PROGRESS NOTE ADULT - SUBJECTIVE AND OBJECTIVE BOX
[facility], [facility]                                          [address] , [address], [place] [address]                                               PHONE: [phone]    FAX: [phone] [address], [address], [place] [address]                                       PHONE: [phone]    FAX: [phone]  *******************************************************************************    Overnight events/Subjective Assessment:  Pt seen and examined earlier today at time of cardiac cath.  s/p GI procedures yesterday without any significant findings.  No CP, palpitations, dyspnea.     INTERPRETATION OF TELEMETRY (personally reviewed): SR with PVCs    penicillin (Unknown)    MEDICATIONS  (STANDING):  ALPRAZolam 0.5 milliGRAM(s) Oral at bedtime  aspirin enteric coated 81 milliGRAM(s) Oral daily  atorvastatin 40 milliGRAM(s) Oral at bedtime  carvedilol 25 milliGRAM(s) Oral every 12 hours  chlorhexidine 0.12% Liquid 15 milliLiter(s) Swish and Spit two times a day  chlorhexidine 4% Liquid 1 Application(s) Topical two times a day  epoetin yolanda-epbx (RETACRIT) Injectable 88625 Unit(s) SubCutaneous every 7 days  folic acid 1 milliGRAM(s) Oral daily  hydrALAZINE 75 milliGRAM(s) Oral three times a day  influenza   Vaccine 0.5 milliLiter(s) IntraMuscular once  iron sucrose IVPB 200 milliGRAM(s) IV Intermittent every 48 hours  levETIRAcetam 750 milliGRAM(s) Oral two times a day  melatonin 5 milliGRAM(s) Oral at bedtime  pantoprazole    Tablet 40 milliGRAM(s) Oral before breakfast  phytonadione   Solution 5 milliGRAM(s) Oral daily  senna 2 Tablet(s) Oral at bedtime  sodium bicarbonate 650 milliGRAM(s) Oral three times a day    MEDICATIONS  (PRN):  albuterol/ipratropium for Nebulization 3 milliLiter(s) Nebulizer every 6 hours PRN Bronchospasm  nitroglycerin     SubLingual 0.4 milliGRAM(s) SubLingual every 5 minutes PRN Chest Pain      Vital Signs Last 24 Hrs  T(C): 36.7 (10 Nov 2020 21:07), Max: 36.8 (10 Nov 2020 14:40)  T(F): 98 (10 Nov 2020 21:07), Max: 98.2 (10 Nov 2020 14:40)  HR: 72 (10 Nov 2020 21:07) (54 - 72)  BP: 152/59 (10 Nov 2020 21:07) (110/65 - 155/77)  BP(mean): --  RR: 18 (10 Nov 2020 21:07) (14 - 19)  SpO2: 100% (10 Nov 2020 21:07) (95% - 100%)    I&O's Detail    09 Nov 2020 07:01  -  10 Nov 2020 07:00  --------------------------------------------------------  IN:    Oral Fluid: 240 mL  Total IN: 240 mL    OUT:  Total OUT: 0 mL    Total NET: 240 mL      10 Nov 2020 07:01  -  10 Nov 2020 21:10  --------------------------------------------------------  IN:    Oral Fluid: 160 mL  Total IN: 160 mL    OUT:    Other (mL): 500 mL  Total OUT: 500 mL    Total NET: -340 mL        I&O's Summary    09 Nov 2020 07:01  -  10 Nov 2020 07:00  --------------------------------------------------------  IN: 240 mL / OUT: 0 mL / NET: 240 mL    10 Nov 2020 07:01  -  10 Nov 2020 21:10  --------------------------------------------------------  IN: 160 mL / OUT: 500 mL / NET: -340 mL            PHYSICAL EXAM:  General: Appears well developed, well nourished, no acute distress  HEENT: Head: normocephalic, atraumatic  Eyes: Pupils equal and reactive  Neck: Supple, no carotid bruit, no JVD, no HJR  CARDIOVASCULAR: Normal S1 and S2, 2/6 systolic murmur at base and apex  LUNGS: Clear to auscultation bilaterally, no rales, rhonchi or wheeze  ABDOMEN: Soft, nontender, non-distended, positive bowel sounds, no mass or bruit  EXTREMITIES: trace b/l LE edema, distal pulses WNL  SKIN: Warm and dry with normal turgor  NEURO: Alert & oriented x 3, grossly intact  PSYCH: normal mood and affect          LABS:                        8.7    8.38  )-----------( 217      ( 10 Nov 2020 06:10 )             28.6     11-10    141  |  105  |  47.0<H>  ----------------------------<  102<H>  3.4<L>   |  24.0  |  2.68<H>    Ca    8.6      10 Nov 2020 06:10  Mg     2.2     11-10    TPro  5.6<L>  /  Alb  2.8<L>  /  TBili  0.4  /  DBili  0.1  /  AST  14  /  ALT  12  /  AlkPhos  55  11-10        PT/INR - ( 09 Nov 2020 06:45 )   PT: 14.3 sec;   INR: 1.25 ratio           Serum Pro-Brain Natriuretic Peptide: 82297 pg/mL (11-05 @ 01:31)  serum  Lipids:     Thyroid Stimulating Hormone, Serum: 1.95 uIU/mL (11-03 @ 08:18)      RADIOLOGY & ADDITIONAL STUDIES:  INTERPRETATION OF TELEMETRY (personally reviewed): SR in 70s with PVCs    < from: US Duplex Carotid Arteries Complete, Bilateral (11.05.20 @ 16:51) >  IMPRESSION:    Moderate plaque in the right carotid bulb with elevated peak systolic and end-diastolic velocities which overall are suggestive of a 50-69% stenosis as described above, however given the peak systolic velocity is suggestive of a greater than 70% stenosis, aCTA of the neck is recommended for further evaluation.    Mild to moderate plaque in the left carotid bulb with less than 50% left internal carotid artery stenosis.      < end of copied text >    ECHO:  < from: TTE Echo Complete w/ Contrast w/ Doppler (11.04.20 @ 08:37) >    PHYSICIAN INTERPRETATION:  Left Ventricle: Left ventricular wall thickness is moderately increased.  Global LV systolic function was mildly decreased. Left ventricular ejection fraction, by visual estimation, is 50 to 55%. Spectral Doppler shows pseudonormal pattern of left ventricular myocardial filling (Grade II diastolic dysfunction).  Right Ventricle: The right ventricular size is normal. RV systolic function is mildly reduced.  Left Atrium: Severely enlarged left atrium.  Right Atrium: Mildly enlarged right atrium.  Pericardium: Trivial pericardial effusion is present. The pericardial effusion is posterior to the left ventricle.  Mitral Valve: Thickening of the anterior and posterior mitral valve leaflets. There is mild mitral annular calcification. Moderate to severe mitral valve regurgitation is seen.  Tricuspid Valve: Moderate tricuspid regurgitation is visualized. Estimated pulmonary artery systolic pressure is 43.8 mmHg assuming a right atrial pressure of 8 mmHg, which is consistent with mild pulmonary hypertension.  Aortic Valve: Peak transaortic gradient equals 4.4 mmHg, mean transaortic gradient equals 2.3 mmHg, the calculated aortic valve area equals 3.18 cm² by the continuity equation consistent with normally opening aortic valve. Moderate to severe aortic valve regurgitation is seen.  Pulmonic Valve: Mild pulmonic valve regurgitation.  Aorta: There is dilatation of the aortic root. Aortic Root measuring 4.2 cm.  Pulmonary Artery: The main pulmonary artery is normal in size.  Venous: The inferior vena cava was dilated, with respiratory size variation greater than 50%.      Summary:   1. Left ventricular ejection fraction, by visual estimation, is 50 to 55%.   2. Mildly decreased global left ventricular systolic function.   3. Severely enlarged left atrium.   4. Moderately increased LV wall thickness.   5. Spectral Doppler shows pseudonormal pattern of left ventricular myocardial filling (Grade II diastolic dysfunction).   6. Mildly reduced RV systolic function.   7. Mildly enlarged right atrium.   8. Mild mitral annular calcification.   9. Moderate to severe mitral valve regurgitation.  10. Thickening of the anterior and posterior mitral valve leaflets.  11. Moderate tricuspid regurgitation.  12. Moderate to severe aortic regurgitation.  13. Mild pulmonic valve regurgitation.  14. Dilatation of the aortic root.  15. Aortic Root measuring 4.2 cm.  16. Estimated pulmonary artery systolic pressure is 43.8 mmHg assuming a right atrial pressure of 8 mmHg, which is consistent with mild pulmonary hypertension.    < from: Cardiac Cath Lab - Adult (11.10.20 @ 07:43) >  DIAGNOSTIC IMPRESSIONS: - Patent ascending aortic graft  - Severe 2 vessel CAD with chronically occluded proximal RCA. There are   brisk collaterals from the LCA.  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, subclavian   artery, arch, and descending aorta  - Low normal LV systolic function with moderate to severe AI and MR (best   seen on echo)  - The IMAs are both patent  - Cardiac output 5.1 LPM; Cardiac index 2.5  - Markedly elevated LVEDP = 38mmHg    < end of copied text >      ASSESSMENT AND PLAN:  In summary, JONATHAN GIBSON is a 60y man with HTN, HLD, CAD, ascending aortic dissection s/p repair (2001), chronic diastolic HFpEF, moderate valvular heart disease including moderate aortic and mitral regurgitation, admitted for evaluation of shortness of breath, found to have new anemia and SANTOS in the setting of markedly elevated BP, incidentally found to have mildly elevated troponin.      - The patient has been chest pain free since admission.  Mildly elevated initial troponin is nonspecific, especially in the setting of SANTOS.  Nothing to suggest acute MI.  Would repeat ECG should symptoms recur.   - Echocardiogram 11/4/20 EF 50-55%, grade II diastolic dysfunction, mildly reduced RV systolic function, severe LAE, mild KOLTON, mod-severe MR and AI, mod TR, dilated aortic root @ 4.2cm  - Mild HF symptoms (acute on chronic diastolic/valvular HF) on admission improving following IV lasix.  Renal function worsened with diuresis and is improving with discontinuation of diuretic.  Further improvement noted with initiation of HD and fluid removal.  - Rhythm/hemodynamics stable: BP markedly elevated on admission now improved following restarting of PO regimen.  Continue Coreg 25mg BID and Hydralazine 75mg TID; titrate prn.  Avoid nephrotoxic agents  - The patient has been evaluated by Dr. Butler of [facility].  He will benefit from valve surgery which is tentatively scheduled for later this week.  - CTA of the chest reviewed.  He has stable residual type B aortic dissection extending to the abdominal aorta, left subclavian artery and innominate artery.  Significant coronary artery calcification seen.  - Acute anemia, probably related to renal insufficiency as GI work up essentially unrevealing  - ASA 81mg daily needs to be continued in an uninterrupted fashion given CAD with prior coronary stents.  - HLD: continue lipitor 40   - Carotid US 11/5/20 Moderate, possibly > 70% VANESSA stenosis, < 50% LICA stenosis.  Continue ASA and statin.  Consider CTA of the carotids.  - Keep K > 4, Mg > 2  - Cardiac cath 11/10/20: severe 2 vessel CAD.  Chronically occluded proximal RCA with collaterals from the LCA.  EF 50%.  Elevated LVEDP.  - Discussed with Dr. Butler.  Will be for valve surgery and CABG later this week.    Celso Stockton MD

## 2020-11-10 NOTE — PROGRESS NOTE ADULT - PROBLEM SELECTOR PLAN 1
Preop workup in progress.  Surgery timing to be determined.  Cardiac Cath and CTA Head and neck today. HD to follow.  Vit K started per Dr. Butler.  potassium repleted.

## 2020-11-10 NOTE — PROGRESS NOTE ADULT - ASSESSMENT
No findings on EGD and colonoscopy to explain patient's anemia.  No further GI workup necessary in the absence of GI symptoms.  GI will sign off.  Please reconsult as needed and call with any questions or concerns.

## 2020-11-10 NOTE — PROGRESS NOTE ADULT - ASSESSMENT
1) Martin -Started on HD before cardiac surgery for optimization  2) MBD of renal dx  3) Anemia of renal dx  4) Vol HTN      Plan for HD on today post cath  continue REINALDO with HD  UF as tolerated.

## 2020-11-10 NOTE — PROGRESS NOTE ADULT - SUBJECTIVE AND OBJECTIVE BOX
Subjective: Lying in bed.  Denies CP or SOB.  NAD noted.      Tele:  SR/PVC.                        T(F): 98.2 (11-10-20 @ 14:40), Max: 98.2 (11-10-20 @ 14:40)  HR: 69 (11-10-20 @ 14:40) (63 - 81)  BP: 112/58 (11-10-20 @ 14:40) (110/65 - 175/72)  RR: 18 (11-10-20 @ 14:40) (14 - 19)  SpO2: 98% (11-10-20 @ 14:40) (95% - 100%) on 2 liters nasal O2.      Weight (kg): 99.79 (11-10 @ 06:23)    Daily     Daily Weight in k.8 (10 Nov 2020 05:00)    LV EF: 50-55%    Allergies:  penicillin (Unknown)      11-10    141  |  105  |  47.0<H>  ----------------------------<  102<H>  3.4<L>   |  24.0  |  2.68<H>    Ca    8.6      10 Nov 2020 06:10  Mg     2.2     11-10    TPro  5.6<L>  /  Alb  2.8<L>  /  TBili  0.4  /  DBili  0.1  /  AST  14  /  ALT  12  /  AlkPhos  55  11-10                               8.7    8.38  )-----------( 217      ( 10 Nov 2020 06:10 )             28.6        PT/INR - ( 2020 06:45 )   PT: 14.3 sec;   INR: 1.25 ratio             I&O's Detail    2020 07:01  -  10 Nov 2020 07:00  --------------------------------------------------------  IN:    Oral Fluid: 240 mL  Total IN: 240 mL    OUT:  Total OUT: 0 mL    Total NET: 240 mL      10 Nov 2020 07:01  -  10 Nov 2020 15:54  --------------------------------------------------------  IN:    Oral Fluid: 160 mL  Total IN: 160 mL    OUT:  Total OUT: 0 mL    Total NET: 160 mL      Active Medications:  albuterol/ipratropium for Nebulization 3 milliLiter(s) Nebulizer every 6 hours PRN  ALPRAZolam 0.5 milliGRAM(s) Oral at bedtime  aspirin enteric coated 81 milliGRAM(s) Oral daily  atorvastatin 40 milliGRAM(s) Oral at bedtime  carvedilol 25 milliGRAM(s) Oral every 12 hours  chlorhexidine 0.12% Liquid 15 milliLiter(s) Swish and Spit two times a day  chlorhexidine 4% Liquid 1 Application(s) Topical two times a day  epoetin yolanda-epbx (RETACRIT) Injectable 49133 Unit(s) SubCutaneous every 7 days  folic acid 1 milliGRAM(s) Oral daily  hydrALAZINE 75 milliGRAM(s) Oral three times a day  influenza   Vaccine 0.5 milliLiter(s) IntraMuscular once  iron sucrose IVPB 200 milliGRAM(s) IV Intermittent every 48 hours  levETIRAcetam 750 milliGRAM(s) Oral two times a day  melatonin 5 milliGRAM(s) Oral at bedtime  nitroglycerin     SubLingual 0.4 milliGRAM(s) SubLingual every 5 minutes PRN  pantoprazole    Tablet 40 milliGRAM(s) Oral before breakfast  phytonadione   Solution 5 milliGRAM(s) Oral daily  senna 2 Tablet(s) Oral at bedtime  sodium bicarbonate 650 milliGRAM(s) Oral three times a day      Physical Exam:    Neuro: AAOX3.   left UE and LE weakness (HX CVA)  .  Pulm: Diminished BLL.    CV: RRR.  +S1+S2.    Abd: Soft/NT/ND.  +BS.     Extremities: No edema.  +pp.                  PAST MEDICAL & SURGICAL HISTORY:  CVA (cerebral vascular accident)    CHF (congestive heart failure)    H/O colectomy    Aorta disorder

## 2020-11-10 NOTE — PROGRESS NOTE ADULT - SUBJECTIVE AND OBJECTIVE BOX
Department of Cardiology                                                                  [facility]/[place] [address]  [place]                                                            Telephone: [phone]. Fax:[phone]                                              Post- Procedure Note: Right and Left Heart Cardiac Catheterization       Narrative:  60y  Male is now s/p right and left heart catheterization via right groin approach with Dr. Stockton:  Right groin precautions  Manual removal of sheaths in RFA and RFV in holding room; no site complications  Severe two vessel disease (chronically occluded RCA and LCx)  Type B dissection   EF 50%  Heparin used: none  Contrast used: 165ml      < from: Cardiac Cath Lab - Adult (11.10.20 @ 07:43) >  VENTRICLES: EF estimated was 50 %.  VALVES: AORTIC VALVE: There was moderate to severe aortic insufficiency.  MITRAL VALVE: The mitral valve exhibited moderate to severe regurgitation.  CORONARY VESSELS: The coronary circulation is right dominant.  LM:   --  LM: The left anterior descending and circumflex arteries arose  anomalously from separate ostia.  LAD:   --  LAD: Angiography showed moderate atherosclerosis.  CX:   --  Proximal circumflex: There was a discrete 60 % stenosis.  --  OM1: The vessel was small to medium sized. There was a discrete 80 %  stenosis. The lesion was irregularly contoured and eccentric.  --  OM2: The vessel was medium to large sized. There was a discrete 80 %  stenosis. The lesion was irregularly contoured, eccentric, and heavily  calcified.  RCA:   --  Proximal RCA: There was a 100 % stenosis. This lesion is a  chronic total occlusion. The distal vessel fills via collaterals from the  LCA.  AORTA: The root exhibited dilatation. Ascending aorta: The segment was  normal in size. Graft repair intact. Aortic arch: Normal. Known residual  Type B dissection by CTA. Descending aorta: The segment was normal in  size. Known residual Type B dissection by CTA.  ARCH VESSELS: Innominate: Known residual Type B dissection by CTA. Proximal  left subclavian: Known residual Type B dissection by CTA. LIMA: Normal.  TAMMY: Normal.  COMPLICATIONS: No complications occurred during the cath lab visit.  DIAGNOSTIC IMPRESSIONS: - Patent ascending aortic graft  - Severe 2 vessel CAD with chronically occluded proximal RCA. There are   brisk collaterals from the LCA.  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, subclavian   artery, arch, and descending aorta  - Low normal LV systolic function with moderate to severe AI and MR (best   seen on echo)  - The IMAs are both patent  - Cardiac output 5.1 LPM; Cardiac index 2.5  - Markedly elevated LVEDP = 38mmHg  DIAGNOSTIC RECOMMENDATIONS: - HD later today as per renal, continue with  fluid removal  - Continue ASA and statin  - For OHS with Dr. Butler later this week  - [facility] will continue to follow  INTERVENTIONAL IMPRESSIONS: - Patent ascending aortic graft  - Severe 2 vessel CAD with chronically occluded proximal RCA.There are   brisk collaterals from the LCA.  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, subclavian   artery, arch, and descending aorta  - Low normal LV systolic function with moderate to severe AI and MR (best   seen on echo)  - The IMAs are both patent  - Cardiac output 5.1 LPM; Cardiac index 2.5  - Markedly elevated LVEDP = 38mmHg  INTERVENTIONAL RECOMMENDATIONS: - HD later today as per renal, continue  with fluid removal      PAST MEDICAL & SURGICAL HISTORY:  CVA (cerebral vascular accident)    CHF (congestive heart failure)    H/O colectomy    Aorta disorder      FAMILY HISTORY:  FH: hypertension      Home Medications:  amLODIPine 5 mg oral tablet: 1 tab(s) orally once a day (15 Oct 2019 09:55)  aspirin 81 mg oral delayed release tablet: 1 tab(s) orally once a day (15 Oct 2019 09:55)  atenolol 50 mg oral tablet: 1 tab(s) orally once a day (15 Oct 2019 09:55)  folic acid 1 mg oral tablet: 1 tab(s) orally once a day (15 Oct 2019 09:55)  Humira 40 mg/0.8 mL subcutaneous kit: every other week (15 Oct 2019 09:55)  Keppra 750 mg oral tablet: 1 tab(s) orally 2 times a day (15 Oct 2019 09:55)  lisinopril 5 mg oral tablet: 1 tab(s) orally once a day (15 Oct 2019 09:55)  omeprazole 20 mg oral delayed release capsule: 1 cap(s) orally once a day (15 Oct 2019 09:55)  pravastatin 20 mg oral tablet: 1 tab(s) orally once a day (15 Oct 2019 09:55)  Vitamin D3 50,000 intl units oral capsule: 1 cap(s) orally once a month (15 Oct 2019 09:55)    Patient is a 60y old  Male who presents with a chief complaint of sob (09 Nov 2020 14:18)    HEALTH ISSUES - PROBLEM Dx:  Seizures  Seizures    CVA (cerebral vascular accident)  CVA (cerebral vascular accident)    Other iron deficiency anemia  Other iron deficiency anemia    Cerebrovascular accident (CVA), unspecified mechanism  Cerebrovascular accident (CVA), unspecified mechanism    SANTOS (acute kidney injury)  SANTOS (acute kidney injury)    Acute diastolic HF (heart failure)  Acute diastolic HF (heart failure)    Mitral and aortic regurgitation  Mitral and aortic regurgitation    Anemia due to other cause  Anemia due to other cause    HPI:  Patient is a 60 yoM with PMH of CAD s/p stent in LAD, leaky aortic valve, uncontrolled bp, thoracic aortic dissection requiring emergent surgery, CVA with left sided sensory loss, colostomy for bowel ischemia s/p reversal, psoriasis on Humira injections presents from home with sob and le edema. Patient states the past several weeks, he has not been taking his medications because he forgot. Patient states the sob has gotten worse over the past few days and also associated with chest pressure. Patient also hasnt seen his cardiologist in several months. Patient worked up in the er and had an elevated bnp and htn urgency and santos. Patient seen at bedside and states feeling a little better.  (02 Nov 2020 14:32)    General: No fatigue, no fevers/chills  Respiratory: No dyspnea, no cough, no wheeze  CV: No chest pain, no palpitations  Abd: No nausea  Neuro: No headache, no dizziness      Objective:  Vital Signs Last 24 Hrs  T(C): 36.4 (10 Nov 2020 06:34), Max: 36.6 (09 Nov 2020 20:42)  T(F): 97.5 (10 Nov 2020 06:34), Max: 97.9 (09 Nov 2020 20:42)  HR: 72 (10 Nov 2020 06:34) (70 - 81)  BP: 141/90 (10 Nov 2020 06:34) (141/90 - 175/72)  BP(mean): --  RR: 18 (10 Nov 2020 06:34) (18 - 19)  SpO2: 97% (10 Nov 2020 06:34) (97% - 100%)    CM: SR  Neuro: A&OX3, CN 2-12 intact  HEENT: NC, AT  Lungs: CTA B/L  CV: S1, S2, no murmur, RRR  Abd: Soft  Right groin:  Soft, no bleeding, no hematoma  Extremity: + distal pulses                          8.7    8.38  )-----------( 217      ( 10 Nov 2020 06:10 )             28.6     11-10    141  |  105  |  47.0<H>  ----------------------------<  102<H>  3.4<L>   |  24.0  |  2.68<H>    Ca    8.6      10 Nov 2020 06:10  Mg     2.2     11-10    TPro  5.6<L>  /  Alb  2.8<L>  /  TBili  0.4  /  DBili  0.1  /  AST  14  /  ALT  12  /  AlkPhos  55  11-10    PT/INR - ( 09 Nov 2020 06:45 )   PT: 14.3 sec;   INR: 1.25 ratio               60y  Male is now s/p right and left heart catheterization via right groin approach with Dr. Stockton    -Pt is already in-patient; pre operative OHS with Dr. Butler  -post cardiac cath orders  -right groin precautions  -bedrest x 2 hours post groin sheath removals  -labs in am  -continue current medical therapy  -Recommend HD for today post cardiac catheterization with Dye load (last HD 11/9)  -[facility] following during hospitalization  -cardiac rehab info provided/referral and communication to cardiac rehab completed  -Management per Cardiothoracic Surgical Team

## 2020-11-10 NOTE — PROGRESS NOTE ADULT - ASSESSMENT
61 y/o M with PMH of CAD s/p stent in LAD, leaky aortic valve, uncontrolled bp, s/p aortic dissection repair 2010, CVA left sided residual weakness, s/p colostomy with reversal, presents from home with sob and LE edema. Echo indicating EF 55% moderate severe, MR, Moderate TR, mod-sever AI, grade II diastolic dysfunction. SANTOS/CKD receiving HD via right IJ permacath. Pt getting EGD and Coloscopy 11/9, 2/2 to anemia. Plan for Cath 11/10 with Dr. Lewis follnelidaed by HD as per renal. Plan discussed with Dr. Butler in am rounds

## 2020-11-10 NOTE — PROGRESS NOTE ADULT - SUBJECTIVE AND OBJECTIVE BOX
[facility] DIVISION OF KIDNEY DISEASES AND HYPERTENSION -- FOLLOW UP NOTE  --------------------------------------------------------------------------------  Chief Complaint: eric    24 hour events/subjective:  no acute event  Plan RHC and LHC today    PAST HISTORY  --------------------------------------------------------------------------------  No significant changes to PMH, PSH, FHx, SHx, unless otherwise noted    ALLERGIES & MEDICATIONS  --------------------------------------------------------------------------------  Allergies    penicillin (Unknown)    Intolerances      Standing Inpatient Medications  ALPRAZolam 0.5 milliGRAM(s) Oral at bedtime  aspirin enteric coated 81 milliGRAM(s) Oral daily  atorvastatin 40 milliGRAM(s) Oral at bedtime  carvedilol 25 milliGRAM(s) Oral every 12 hours  chlorhexidine 0.12% Liquid 15 milliLiter(s) Swish and Spit two times a day  chlorhexidine 4% Liquid 1 Application(s) Topical two times a day  epoetin yolanda-epbx (RETACRIT) Injectable 87235 Unit(s) SubCutaneous every 7 days  folic acid 1 milliGRAM(s) Oral daily  hydrALAZINE 75 milliGRAM(s) Oral three times a day  influenza   Vaccine 0.5 milliLiter(s) IntraMuscular once  iron sucrose IVPB 200 milliGRAM(s) IV Intermittent every 48 hours  levETIRAcetam 750 milliGRAM(s) Oral two times a day  melatonin 5 milliGRAM(s) Oral at bedtime  pantoprazole    Tablet 40 milliGRAM(s) Oral before breakfast  phytonadione   Solution 5 milliGRAM(s) Oral daily  senna 2 Tablet(s) Oral at bedtime  sodium bicarbonate 650 milliGRAM(s) Oral three times a day    PRN Inpatient Medications  albuterol/ipratropium for Nebulization 3 milliLiter(s) Nebulizer every 6 hours PRN  nitroglycerin     SubLingual 0.4 milliGRAM(s) SubLingual every 5 minutes PRN      REVIEW OF SYSTEMS  --------------------------------------------------------------------------------  Gen: No weight changes, fatigue, fevers/chills, weakness  Skin: No rashes  Head/Eyes/Ears/Mouth: No headache; Normal hearing; Normal vision w/o blurriness; No sinus pain/discomfort, sore throat  Respiratory: No dyspnea, cough, wheezing, hemoptysis  CV: No chest pain, PND, orthopnea  GI: No abdominal pain, diarrhea, constipation, nausea, vomiting, melena, hematochezia  : No increased frequency, dysuria, hematuria, nocturia  MSK: No joint pain/swelling; no back pain; no edema  Neuro: No dizziness/lightheadedness, weakness, seizures, numbness, tingling  Heme: No easy bruising or bleeding  Endo: No heat/cold intolerance  Psych: No significant nervousness, anxiety, stress, depression    All other systems were reviewed and are negative, except as noted.    VITALS/PHYSICAL EXAM  --------------------------------------------------------------------------------  T(C): 36.4 (11-10-20 @ 11:13), Max: 36.6 (11-09-20 @ 20:42)  HR: 68 (11-10-20 @ 11:30) (64 - 81)  BP: 148/68 (11-10-20 @ 11:30) (110/65 - 175/72)  RR: 14 (11-10-20 @ 11:30) (14 - 19)  SpO2: 95% (11-10-20 @ 11:30) (95% - 100%)  Wt(kg): --    Weight (kg): 99.79 (11-10-20 @ 06:23)      11-09-20 @ 07:01  -  11-10-20 @ 07:00  --------------------------------------------------------  IN: 240 mL / OUT: 0 mL / NET: 240 mL      Physical Exam:  	  Gen: NAD,  	HEENT: PERRL, supple neck, clear oropharynx  	Pulm: CTA B/L  	CV: RRR, S1S2; no rub  	Back: No spinal or CVA tenderness; no sacral edema  	Abd: +BS, soft, nontender/nondistended  	: No suprapubic tenderness  	UE: Warm, no edema; no asterixis  	LE: Warm,  no edema  	Neuro: No focal deficits, intact gait  	Psych: Normal affect and mood  	Skin: Warm, without rashes  	Vascular access: R[facility] non [facility]    LABS/STUDIES  --------------------------------------------------------------------------------              8.7    8.38  >-----------<  217      [11-10-20 @ 06:10]              28.6     141  |  105  |  47.0  ----------------------------<  102      [11-10-20 @ 06:10]  3.4   |  24.0  |  2.68        Ca     8.6     [11-10-20 @ 06:10]      Mg     2.2     [11-10-20 @ 06:10]    TPro  5.6  /  Alb  2.8  /  TBili  0.4  /  DBili  0.1  /  AST  14  /  ALT  12  /  AlkPhos  55  [11-10-20 @ 06:10]    PT/INR: PT 14.3 , INR 1.25       [11-09-20 @ 06:45]      Creatinine Trend:  SCr 2.68 [11-10 @ 06:10]  SCr 2.66 [11-09 @ 06:45]  SCr 2.70 [11-08 @ 21:52]  SCr 2.53 [11-08 @ 06:23]  SCr 2.82 [11-07 @ 05:47]        Iron 29, TIBC 332, %sat 9      [11-03-20 @ 08:18]  Ferritin 230      [11-03-20 @ 08:18]  TSH 1.95      [11-03-20 @ 08:18]  Lipid: chol 154, , HDL 44, LDL --      [11-03-20 @ 08:18]    HBsAb <3.0      [11-07-20 @ 05:41]  HBsAg Nonreact      [11-07-20 @ 05:41]  HBcAb Nonreact      [11-07-20 @ 05:41]  HCV 0.07, Nonreact      [11-07-20 @ 05:41]

## 2020-11-11 LAB
ALBUMIN SERPL ELPH-MCNC: 2.7 G/DL — LOW (ref 3.3–5.2)
ALP SERPL-CCNC: 58 U/L — SIGNIFICANT CHANGE UP (ref 40–120)
ALT FLD-CCNC: 11 U/L — SIGNIFICANT CHANGE UP
ANION GAP SERPL CALC-SCNC: 9 MMOL/L — SIGNIFICANT CHANGE UP (ref 5–17)
AST SERPL-CCNC: 12 U/L — SIGNIFICANT CHANGE UP
BILIRUB SERPL-MCNC: 0.3 MG/DL — LOW (ref 0.4–2)
BUN SERPL-MCNC: 28 MG/DL — HIGH (ref 8–20)
CALCIUM SERPL-MCNC: 8.1 MG/DL — LOW (ref 8.6–10.2)
CHLORIDE SERPL-SCNC: 105 MMOL/L — SIGNIFICANT CHANGE UP (ref 98–107)
CO2 SERPL-SCNC: 25 MMOL/L — SIGNIFICANT CHANGE UP (ref 22–29)
CREAT SERPL-MCNC: 2.34 MG/DL — HIGH (ref 0.5–1.3)
GLUCOSE SERPL-MCNC: 112 MG/DL — HIGH (ref 70–99)
HCT VFR BLD CALC: 28.3 % — LOW (ref 39–50)
HGB BLD-MCNC: 8.7 G/DL — LOW (ref 13–17)
MAGNESIUM SERPL-MCNC: 1.9 MG/DL — SIGNIFICANT CHANGE UP (ref 1.6–2.6)
MCHC RBC-ENTMCNC: 29.2 PG — SIGNIFICANT CHANGE UP (ref 27–34)
MCHC RBC-ENTMCNC: 30.7 GM/DL — LOW (ref 32–36)
MCV RBC AUTO: 95 FL — SIGNIFICANT CHANGE UP (ref 80–100)
PHOSPHATE SERPL-MCNC: 3.6 MG/DL — SIGNIFICANT CHANGE UP (ref 2.4–4.7)
PLATELET # BLD AUTO: 201 K/UL — SIGNIFICANT CHANGE UP (ref 150–400)
POTASSIUM SERPL-MCNC: 3.6 MMOL/L — SIGNIFICANT CHANGE UP (ref 3.5–5.3)
POTASSIUM SERPL-SCNC: 3.6 MMOL/L — SIGNIFICANT CHANGE UP (ref 3.5–5.3)
PROT SERPL-MCNC: 5.5 G/DL — LOW (ref 6.6–8.7)
RBC # BLD: 2.98 M/UL — LOW (ref 4.2–5.8)
RBC # FLD: 15.5 % — HIGH (ref 10.3–14.5)
SODIUM SERPL-SCNC: 139 MMOL/L — SIGNIFICANT CHANGE UP (ref 135–145)
SURGICAL PATHOLOGY STUDY: SIGNIFICANT CHANGE UP
WBC # BLD: 8.19 K/UL — SIGNIFICANT CHANGE UP (ref 3.8–10.5)
WBC # FLD AUTO: 8.19 K/UL — SIGNIFICANT CHANGE UP (ref 3.8–10.5)

## 2020-11-11 PROCEDURE — 71045 X-RAY EXAM CHEST 1 VIEW: CPT | Mod: 26

## 2020-11-11 PROCEDURE — 36569 INSJ PICC 5 YR+ W/O IMAGING: CPT

## 2020-11-11 PROCEDURE — 99232 SBSQ HOSP IP/OBS MODERATE 35: CPT | Mod: 25

## 2020-11-11 PROCEDURE — 99233 SBSQ HOSP IP/OBS HIGH 50: CPT

## 2020-11-11 RX ORDER — MAGNESIUM SULFATE 500 MG/ML
2 VIAL (ML) INJECTION ONCE
Refills: 0 | Status: COMPLETED | OUTPATIENT
Start: 2020-11-11 | End: 2020-11-11

## 2020-11-11 RX ORDER — POTASSIUM CHLORIDE 20 MEQ
20 PACKET (EA) ORAL ONCE
Refills: 0 | Status: COMPLETED | OUTPATIENT
Start: 2020-11-11 | End: 2020-11-11

## 2020-11-11 RX ADMIN — PANTOPRAZOLE SODIUM 40 MILLIGRAM(S): 20 TABLET, DELAYED RELEASE ORAL at 06:11

## 2020-11-11 RX ADMIN — Medication 75 MILLIGRAM(S): at 06:11

## 2020-11-11 RX ADMIN — CHLORHEXIDINE GLUCONATE 15 MILLILITER(S): 213 SOLUTION TOPICAL at 21:14

## 2020-11-11 RX ADMIN — SENNA PLUS 2 TABLET(S): 8.6 TABLET ORAL at 21:14

## 2020-11-11 RX ADMIN — ATORVASTATIN CALCIUM 40 MILLIGRAM(S): 80 TABLET, FILM COATED ORAL at 21:20

## 2020-11-11 RX ADMIN — Medication 75 MILLIGRAM(S): at 14:28

## 2020-11-11 RX ADMIN — LEVETIRACETAM 750 MILLIGRAM(S): 250 TABLET, FILM COATED ORAL at 06:11

## 2020-11-11 RX ADMIN — Medication 650 MILLIGRAM(S): at 14:28

## 2020-11-11 RX ADMIN — Medication 650 MILLIGRAM(S): at 21:14

## 2020-11-11 RX ADMIN — CHLORHEXIDINE GLUCONATE 15 MILLILITER(S): 213 SOLUTION TOPICAL at 06:10

## 2020-11-11 RX ADMIN — CARVEDILOL PHOSPHATE 25 MILLIGRAM(S): 80 CAPSULE, EXTENDED RELEASE ORAL at 06:12

## 2020-11-11 RX ADMIN — CHLORHEXIDINE GLUCONATE 1 APPLICATION(S): 213 SOLUTION TOPICAL at 06:10

## 2020-11-11 RX ADMIN — Medication 75 MILLIGRAM(S): at 21:14

## 2020-11-11 RX ADMIN — Medication 1 MILLIGRAM(S): at 08:50

## 2020-11-11 RX ADMIN — Medication 50 GRAM(S): at 15:50

## 2020-11-11 RX ADMIN — CARVEDILOL PHOSPHATE 25 MILLIGRAM(S): 80 CAPSULE, EXTENDED RELEASE ORAL at 17:19

## 2020-11-11 RX ADMIN — Medication 81 MILLIGRAM(S): at 08:50

## 2020-11-11 RX ADMIN — CHLORHEXIDINE GLUCONATE 1 APPLICATION(S): 213 SOLUTION TOPICAL at 21:15

## 2020-11-11 RX ADMIN — Medication 650 MILLIGRAM(S): at 06:12

## 2020-11-11 RX ADMIN — Medication 20 MILLIEQUIVALENT(S): at 12:24

## 2020-11-11 RX ADMIN — LEVETIRACETAM 750 MILLIGRAM(S): 250 TABLET, FILM COATED ORAL at 17:19

## 2020-11-11 RX ADMIN — Medication 0.5 MILLIGRAM(S): at 21:14

## 2020-11-11 RX ADMIN — ERYTHROPOIETIN 10000 UNIT(S): 10000 INJECTION, SOLUTION INTRAVENOUS; SUBCUTANEOUS at 12:22

## 2020-11-11 RX ADMIN — Medication 5 MILLIGRAM(S): at 21:14

## 2020-11-11 RX ADMIN — Medication 5 MILLIGRAM(S): at 12:23

## 2020-11-11 NOTE — PROGRESS NOTE ADULT - SUBJECTIVE AND OBJECTIVE BOX
Subjective: Pt lying in bed.  Denies CP or SOB.  NAD noted.      Tele:    SR                      T(F): 97.7 (20 @ 06:07), Max: 98.2 (11-10-20 @ 14:40)  HR: 89 (20 @ 06:07) (54 - 89)  BP: 126/49 (20 @ 06:07) (112/58 - 155/77)  RR: 16 (20 @ 06:07) (16 - 18)  SpO2: 94% (20 @ 06:07) (94% - 100%) on room air.      Daily     Daily Weight in k.4 (2020 05:33)    LV EF: 50-55%    penicillin (Unknown)          139  |  105  |  28.0<H>  ----------------------------<  112<H>  3.6   |  25.0  |  2.34<H>    Ca    8.1<L>      2020 06:58  Phos  3.6       Mg     1.9         TPro  5.5<L>  /  Alb  2.7<L>  /  TBili  0.3<L>  /  DBili  x   /  AST  12  /  ALT  11  /  AlkPhos  58  11                               8.7    8.19  )-----------( 201      ( 2020 06:58 )             28.3          I&O's Detail    10 Nov 2020 07:01  -  2020 07:00  --------------------------------------------------------  IN:    Oral Fluid: 400 mL  Total IN: 400 mL    OUT:    Other (mL): 500 mL  Total OUT: 500 mL    Total NET: -100 mL      2020 07:01  -  2020 11:30  --------------------------------------------------------  IN:    Oral Fluid: 240 mL  Total IN: 240 mL    OUT:    Voided (mL): 350 mL  Total OUT: 350 mL    Total NET: -110 mL    Active Medications:  albuterol/ipratropium for Nebulization 3 milliLiter(s) Nebulizer every 6 hours PRN  ALPRAZolam 0.5 milliGRAM(s) Oral at bedtime  aspirin enteric coated 81 milliGRAM(s) Oral daily  atorvastatin 40 milliGRAM(s) Oral at bedtime  carvedilol 25 milliGRAM(s) Oral every 12 hours  chlorhexidine 0.12% Liquid 15 milliLiter(s) Swish and Spit two times a day  chlorhexidine 4% Liquid 1 Application(s) Topical two times a day  epoetin yolanda-epbx (RETACRIT) Injectable 85798 Unit(s) SubCutaneous every 7 days  folic acid 1 milliGRAM(s) Oral daily  hydrALAZINE 75 milliGRAM(s) Oral three times a day  influenza   Vaccine 0.5 milliLiter(s) IntraMuscular once  iron sucrose IVPB 200 milliGRAM(s) IV Intermittent every 48 hours  levETIRAcetam 750 milliGRAM(s) Oral two times a day  magnesium sulfate  IVPB 2 Gram(s) IV Intermittent once  melatonin 5 milliGRAM(s) Oral at bedtime  nitroglycerin     SubLingual 0.4 milliGRAM(s) SubLingual every 5 minutes PRN  pantoprazole    Tablet 40 milliGRAM(s) Oral before breakfast  phytonadione   Solution 5 milliGRAM(s) Oral daily  potassium chloride    Tablet ER 20 milliEquivalent(s) Oral once  senna 2 Tablet(s) Oral at bedtime  sodium bicarbonate 650 milliGRAM(s) Oral three times a day      Physical Exam:    Neuro: AAOX3.  Agitated.  Won't make eye contact  left UE and LE weakness (HX CVA)  .  Pulm: Diminished BLL.    CV: RRR.  +S1+S2.    Abd: Soft/NT/ND.  +BS.     Extremities: No edema.  +pp.                           PAST MEDICAL & SURGICAL HISTORY:  CVA (cerebral vascular accident)    CHF (congestive heart failure)    H/O colectomy    Aorta disorder

## 2020-11-11 NOTE — PROGRESS NOTE ADULT - ASSESSMENT
1) Martin -Started on HD before cardiac surgery for optimization  2) MBD of renal dx  3) Anemia of renal dx  4) Vol HTN    HD yesterday post cath  Surgery rescheduled for Friday  Will dialyze in AM  Continue retacrit; anemia not at goal  dw Hawa Nava CTS PA

## 2020-11-11 NOTE — PROGRESS NOTE ADULT - SUBJECTIVE AND OBJECTIVE BOX
REASON FOR VISIT:  Post cath check  UPDATE:  Feeling ok no cp     11-11    139  |  105  |  28.0<H>  ----------------------------<  112<H>  3.6   |  25.0  |  2.34<H>    Ca    8.1<L>      11 Nov 2020 06:58  Phos  3.6     11-11  Mg     1.9     11-11    TPro  5.5<L>  /  Alb  2.7<L>  /  TBili  0.3<L>  /  DBili  x   /  AST  12  /  ALT  11  /  AlkPhos  58  11-11    LIVER FUNCTIONS - ( 11 Nov 2020 06:58 )  Alb: 2.7 g/dL / Pro: 5.5 g/dL / ALK PHOS: 58 U/L / ALT: 11 U/L / AST: 12 U/L / GGT: x             MEDICATIONS  (STANDING):  ALPRAZolam 0.5 milliGRAM(s) Oral at bedtime  aspirin enteric coated 81 milliGRAM(s) Oral daily  atorvastatin 40 milliGRAM(s) Oral at bedtime  carvedilol 25 milliGRAM(s) Oral every 12 hours  chlorhexidine 0.12% Liquid 15 milliLiter(s) Swish and Spit two times a day  chlorhexidine 4% Liquid 1 Application(s) Topical two times a day  epoetin yolanda-epbx (RETACRIT) Injectable 20035 Unit(s) SubCutaneous every 7 days  folic acid 1 milliGRAM(s) Oral daily  hydrALAZINE 75 milliGRAM(s) Oral three times a day  influenza   Vaccine 0.5 milliLiter(s) IntraMuscular once  iron sucrose IVPB 200 milliGRAM(s) IV Intermittent every 48 hours  levETIRAcetam 750 milliGRAM(s) Oral two times a day  melatonin 5 milliGRAM(s) Oral at bedtime  pantoprazole    Tablet 40 milliGRAM(s) Oral before breakfast  phytonadione   Solution 5 milliGRAM(s) Oral daily  senna 2 Tablet(s) Oral at bedtime  sodium bicarbonate 650 milliGRAM(s) Oral three times a day    ROS:    No fever chills  Cardiac  No cp sob or palp  Resp  no cough no mucus production  Gi  no abd pain no melana  Ext No calf tenderness, no edema    Vital Signs Last 24 Hrs  T(C): 36.5 (11 Nov 2020 06:07), Max: 36.8 (10 Nov 2020 14:40)  T(F): 97.7 (11 Nov 2020 06:07), Max: 98.2 (10 Nov 2020 14:40)  HR: 89 (11 Nov 2020 06:07) (54 - 89)  BP: 126/49 (11 Nov 2020 06:07) (110/65 - 155/77)  BP(mean): --  RR: 16 (11 Nov 2020 06:07) (14 - 18)  SpO2: 94% (11 Nov 2020 06:07) (94% - 100%)  T(C): 36.5 (11-11-20 @ 06:07), Max: 36.8 (11-10-20 @ 14:40)  HR: 89 (11-11-20 @ 06:07) (54 - 89)  BP: 126/49 (11-11-20 @ 06:07) (110/65 - 155/77)  RR: 16 (11-11-20 @ 06:07) (14 - 18)  SpO2: 94% (11-11-20 @ 06:07) (94% - 100%)    HEENT Head atraumatic eomi, oral mucosa moist  CV S1&S2      No r/g/ m   RESP  clear  GI  Soft active bowel sounds non tender  Right groin with good pulse no ecchymosis  EXT  No clubbing/Cyanosis /Edema  NEURO  Alert oriented  No gross motor or sensory deficits    < from: Cardiac Cath Lab - Adult (11.10.20 @ 07:43) >  VENTRICLES: EF estimated was 50 %.  VALVES: AORTIC VALVE: There was moderate to severe aortic insufficiency.  MITRAL VALVE: The mitral valve exhibited moderate to severe regurgitation.  CORONARY VESSELS: The coronary circulation is right dominant.  LM:   --  LM: The left anterior descending and circumflex arteries arose  anomalously from separate ostia.  LAD:   --  LAD: Angiography showed moderate atherosclerosis.  CX:   --  Proximal circumflex: There was a discrete 60 % stenosis.  --  OM1: The vessel was small to medium sized. There was a discrete 80 %  stenosis. The lesion was irregularly contoured and eccentric.  --  OM2: The vessel was medium to large sized. There was a discrete 80 %  stenosis. The lesion was irregularly contoured, eccentric, and heavily  calcified.  RCA:   --  Proximal RCA: There was a 100 % stenosis. This lesion is a  chronic total occlusion. The distal vessel fills via collaterals from the  LCA.  AORTA: The root exhibited dilatation. Ascending aorta: The segment was  normal in size. Graft repair intact. Aortic arch: Normal. Known residual  Type B dissection by CTA. Descending aorta: The segment was normal in  size. Known residual Type B dissection by CTA.  ARCH VESSELS: Innominate: Known residual Type B dissection by CTA. Proximal  left subclavian: Known residual Type B dissection by CTA. LIMA: Normal.  TAMMY: Normal.  COMPLICATIONS: No complications occurred during the cath lab visit.  DIAGNOSTIC IMPRESSIONS: - Patent ascending aortic graft  - Severe 2 vessel CAD with chronically occluded proximal RCA. There are   brisk collaterals from the LCA.  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, subclavian   artery, arch, and descending aorta  - Low normal LV systolic function with moderate to severe AI and MR (best   seen on echo)  - The IMAs are both patent  - Cardiac output 5.1 LPM; Cardiac index 2.5  - Markedly elevated LVEDP = 38mmHg  DIAGNOSTIC RECOMMENDATIONS: - HD later today as per renal, continue with  fluid removal  - Continue ASA and statin  - For OHS with Dr. Butler later this week  - [facility] will continue to follow  INTERVENTIONAL IMPRESSIONS: - Patent ascending aortic graft  - Severe 2 vessel CAD with chronically occluded proximal RCA.There are   brisk collaterals from the LCA.  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, subclavian   artery, arch, and descending aorta  - Low normal LV systolic function with moderate to severe AI and MR (best   seen on echo)  - The IMAs are both patent  - Cardiac output 5.1 LPM; Cardiac index 2.5  - Markedly elevated LVEDP = 38mmHg  INTERVENTIONAL RECOMMENDATIONS: - HD later today as per renal, continue  with fluid removal    cho< from: TTE Echo Complete w/ Contrast w/ Doppler (11.04.20 @ 08:37) >  Summary:   1. Left ventricular ejection fraction, by visual estimation, is 50 to 55%.   2. Mildly decreased global left ventricular systolic function.   3. Severely enlarged left atrium.   4. Moderately increased LV wall thickness.   5. Spectral Doppler shows pseudonormal pattern of left ventricular myocardial filling (Grade II diastolic dysfunction).   6. Mildly reduced RV systolic function.   7. Mildly enlarged right atrium.   8. Mild mitral annular calcification.   9. Moderate to severe mitral valve regurgitation.  10. Thickening of the anterior and posterior mitral valve leaflets.  11. Moderate tricuspid regurgitation.  12. Moderate to severe aortic regurgitation.  13. Mild pulmonic valve regurgitation.  14. Dilatation of the aortic root.  15. Aortic Root measuring 4.2 cm.  16. Estimated pulmonary artery systolic pressure is 43.8 mmHg assuming a right atrial pressure of 8 mmHg, which is consistent with mild pulmonary hypertension.     60 yoM with PMH of CAD s/p stent in LAD, leaky aortic valve, uncontrolled bp, thoracic aortic dissection requiring emergent surgery, CVA with left sided sensory loss, colostomy for bowel ischemia s/p reversal, psoriasis on Humira injections presents from home with sob and le edema. He has been non compliant with medications  Echo EF 55% moderate to severe aortic regurg, mod severe mitral valve regurg  Cath with 2 vessel CAD -> Referred to CV surgery    DOUBLE VESSEL DISEASE/ MITRAL VALVE DX/AORTIC REGURG  s/p cath  groin stable  Groin instructions given to patient  c/w asa, atorvastating coreg  Being seen by Vasc surgery for Valve and CABG

## 2020-11-11 NOTE — PROGRESS NOTE ADULT - SUBJECTIVE AND OBJECTIVE BOX
[facility], [facility]                                          [address] , [address], [place] [address]                                               PHONE: [phone]    FAX: [phone] [address] [facility], [address], [place] [address]                                       PHONE: [phone]    FAX: [phone]  *******************************************************************************    Overnight events/Subjective Assessment: no acute events overnight.  Feeling well without chest pain, palpitations, shortness of breath.      INTERPRETATION OF TELEMETRY (personally reviewed):    penicillin (Unknown)    MEDICATIONS  (STANDING):  ALPRAZolam 0.5 milliGRAM(s) Oral at bedtime  aspirin enteric coated 81 milliGRAM(s) Oral daily  atorvastatin 40 milliGRAM(s) Oral at bedtime  carvedilol 25 milliGRAM(s) Oral every 12 hours  chlorhexidine 0.12% Liquid 15 milliLiter(s) Swish and Spit two times a day  chlorhexidine 4% Liquid 1 Application(s) Topical two times a day  epoetin yolanda-epbx (RETACRIT) Injectable 01473 Unit(s) SubCutaneous every 7 days  folic acid 1 milliGRAM(s) Oral daily  hydrALAZINE 75 milliGRAM(s) Oral three times a day  influenza   Vaccine 0.5 milliLiter(s) IntraMuscular once  iron sucrose IVPB 200 milliGRAM(s) IV Intermittent every 48 hours  levETIRAcetam 750 milliGRAM(s) Oral two times a day  magnesium sulfate  IVPB 2 Gram(s) IV Intermittent once  melatonin 5 milliGRAM(s) Oral at bedtime  pantoprazole    Tablet 40 milliGRAM(s) Oral before breakfast  phytonadione   Solution 5 milliGRAM(s) Oral daily  potassium chloride    Tablet ER 20 milliEquivalent(s) Oral once  senna 2 Tablet(s) Oral at bedtime  sodium bicarbonate 650 milliGRAM(s) Oral three times a day    MEDICATIONS  (PRN):  albuterol/ipratropium for Nebulization 3 milliLiter(s) Nebulizer every 6 hours PRN Bronchospasm  nitroglycerin     SubLingual 0.4 milliGRAM(s) SubLingual every 5 minutes PRN Chest Pain      Vital Signs Last 24 Hrs  T(C): 36.5 (11 Nov 2020 06:07), Max: 36.8 (10 Nov 2020 14:40)  T(F): 97.7 (11 Nov 2020 06:07), Max: 98.2 (10 Nov 2020 14:40)  HR: 89 (11 Nov 2020 06:07) (54 - 89)  BP: 126/49 (11 Nov 2020 06:07) (112/58 - 155/77)  BP(mean): --  RR: 16 (11 Nov 2020 06:07) (14 - 18)  SpO2: 94% (11 Nov 2020 06:07) (94% - 100%)    I&O's Detail    10 Nov 2020 07:01  -  11 Nov 2020 07:00  --------------------------------------------------------  IN:    Oral Fluid: 400 mL  Total IN: 400 mL    OUT:    Other (mL): 500 mL  Total OUT: 500 mL    Total NET: -100 mL        I&O's Summary    10 Nov 2020 07:01  -  11 Nov 2020 07:00  --------------------------------------------------------  IN: 400 mL / OUT: 500 mL / NET: -100 mL            PHYSICAL EXAM:  General: Appears well developed, well nourished, no acute distress  HEENT: Head: normocephalic, atraumatic  Eyes: Pupils equal and reactive  Neck: Supple, no carotid bruit, no JVD, no HJR  CARDIOVASCULAR: Normal S1 and S2, no murmur, rub, or gallop  LUNGS: Clear to auscultation bilaterally, no rales, rhonchi or wheeze  ABDOMEN: Soft, nontender, non-distended, positive bowel sounds, no mass or bruit  EXTREMITIES: No edema, distal pulses WNL  SKIN: Warm and dry with normal turgor  NEURO: Alert & oriented x 3, grossly intact  PSYCH: normal mood and affect        LABS:                        8.7    8.19  )-----------( 201      ( 11 Nov 2020 06:58 )             28.3     11-11    139  |  105  |  28.0<H>  ----------------------------<  112<H>  3.6   |  25.0  |  2.34<H>    Ca    8.1<L>      11 Nov 2020 06:58  Phos  3.6     11-11  Mg     1.9     11-11    TPro  5.5<L>  /  Alb  2.7<L>  /  TBili  0.3<L>  /  DBili  x   /  AST  12  /  ALT  11  /  AlkPhos  58  11-11          Serum Pro-Brain Natriuretic Peptide: 50173 pg/mL (11-05 @ 01:31)  serum  Lipids:     Thyroid Stimulating Hormone, Serum: 1.95 uIU/mL (11-03 @ 08:18)      RADIOLOGY & ADDITIONAL STUDIES  REVIEWED       ASSESSMENT AND PLAN:  In summary, In summary, JONATHAN GIBSON is a 60y man with HTN, HLD, CAD, ascending aortic dissection s/p repair (2001), chronic diastolic HFpEF, moderate valvular heart disease including moderate aortic and mitral regurgitation, admitted for evaluation of shortness of breath, found to have new anemia and SANTOS in the setting of markedly elevated BP, incidentally found to have mildly elevated troponin.      - The patient has been chest pain free since admission.  Mildly elevated initial troponin is nonspecific, especially in the setting of SANTOS.  Nothing to suggest acute MI.  Would repeat ECG should symptoms recur.   - Echocardiogram 11/4/20 EF 50-55%, grade II diastolic dysfunction, mildly reduced RV systolic function, severe LAE, mild KOLTON, mod-severe MR and AI, mod TR, dilated aortic root @ 4.2cm  - Mild HF symptoms (acute on chronic diastolic/valvular HF) on admission improving following IV lasix.  Renal function worsened with diuresis and is improving with discontinuation of diuretic.  Further improvement noted with initiation of HD and fluid removal.  - Rhythm/hemodynamics stable: BP markedly elevated on admission now improved following restarting of PO regimen.  Continue Coreg 25mg BID and Hydralazine 75mg TID; titrate prn.  Avoid nephrotoxic agents  - The patient has been evaluated by Dr. Butler of [facility].  He will benefit from valve surgery which is tentatively scheduled for later this week.  - CTA of the chest reviewed.  He has stable residual type B aortic dissection extending to the abdominal aorta, left subclavian artery and innominate artery.  Significant coronary artery calcification seen.  - Acute anemia, probably related to renal insufficiency as GI work up essentially unrevealing  - ASA 81mg daily needs to be continued in an uninterrupted fashion given CAD with prior coronary stents.  - HLD: continue lipitor 40   - Carotid US 11/5/20 Moderate, possibly > 70% VANESSA stenosis, < 50% LICA stenosis.  Continue ASA and statin.  Consider CTA of the carotids.  - Keep K > 4, Mg > 2  - Cardiac cath 11/10/20: severe 2 vessel CAD.  Chronically occluded proximal RCA with collaterals from the LCA.  EF 50%.  Elevated LVEDP.  - Dr Stockton discussed with Dr. Butler.  Will be for valve surgery and CABG later this week. tentatively for Friday 11/13.       Pepe Souza M.D.

## 2020-11-11 NOTE — PROGRESS NOTE ADULT - ASSESSMENT
Cardiac meds  aspirin enteric coated 81 milliGRAM(s) Oral daily  atorvastatin 40 milliGRAM(s) Oral at bedtime  carvedilol 25 milliGRAM(s) Oral every 12 hours  hydrALAZINE 75 milliGRAM(s) Oral three times a day

## 2020-11-11 NOTE — PROGRESS NOTE ADULT - SUBJECTIVE AND OBJECTIVE BOX
[facility] DIVISION OF KIDNEY DISEASES AND HYPERTENSION -- FOLLOW UP NOTE  --------------------------------------------------------------------------------  Chief Complaint:  SANTOS    24 hour events/subjective:  no acute event  Plan RHC and LHC today      PAST HISTORY  --------------------------------------------------------------------------------  No significant changes to PMH, PSH, FHx, SHx, unless otherwise noted    ALLERGIES & MEDICATIONS  --------------------------------------------------------------------------------  Allergies    penicillin (Unknown)    Intolerances      Standing Inpatient Medications  ALPRAZolam 0.5 milliGRAM(s) Oral at bedtime  aspirin enteric coated 81 milliGRAM(s) Oral daily  atorvastatin 40 milliGRAM(s) Oral at bedtime  carvedilol 25 milliGRAM(s) Oral every 12 hours  chlorhexidine 0.12% Liquid 15 milliLiter(s) Swish and Spit two times a day  chlorhexidine 4% Liquid 1 Application(s) Topical two times a day  epoetin yolanda-epbx (RETACRIT) Injectable 06959 Unit(s) SubCutaneous every 7 days  folic acid 1 milliGRAM(s) Oral daily  hydrALAZINE 75 milliGRAM(s) Oral three times a day  influenza   Vaccine 0.5 milliLiter(s) IntraMuscular once  iron sucrose IVPB 200 milliGRAM(s) IV Intermittent every 48 hours  levETIRAcetam 750 milliGRAM(s) Oral two times a day  magnesium sulfate  IVPB 2 Gram(s) IV Intermittent once  melatonin 5 milliGRAM(s) Oral at bedtime  pantoprazole    Tablet 40 milliGRAM(s) Oral before breakfast  phytonadione   Solution 5 milliGRAM(s) Oral daily  potassium chloride    Tablet ER 20 milliEquivalent(s) Oral once  senna 2 Tablet(s) Oral at bedtime  sodium bicarbonate 650 milliGRAM(s) Oral three times a day    PRN Inpatient Medications  albuterol/ipratropium for Nebulization 3 milliLiter(s) Nebulizer every 6 hours PRN  nitroglycerin     SubLingual 0.4 milliGRAM(s) SubLingual every 5 minutes PRN      REVIEW OF SYSTEMS  --------------------------------------------------------------------------------  Gen: No weight changes, fatigue, fevers/chills, weakness  Skin: No rashes  Head/Eyes/Ears/Mouth: No headache; Normal hearing; Normal vision w/o blurriness; No sinus pain/discomfort, sore throat  Respiratory: No dyspnea, cough, wheezing, hemoptysis  CV: No chest pain, PND, orthopnea  GI: No abdominal pain, diarrhea, constipation, nausea, vomiting, melena, hematochezia  : No increased frequency, dysuria, hematuria, nocturia  MSK: No joint pain/swelling; no back pain; no edema  Neuro: No dizziness/lightheadedness, weakness, seizures, numbness, tingling  Heme: No easy bruising or bleeding  Endo: No heat/cold intolerance  Psych: No significant nervousness, anxiety, stress, depression    All other systems were reviewed and are negative, except as noted.    VITALS/PHYSICAL EXAM  --------------------------------------------------------------------------------  T(C): 36.5 (11-11-20 @ 06:07), Max: 36.8 (11-10-20 @ 14:40)  HR: 89 (11-11-20 @ 06:07) (54 - 89)  BP: 126/49 (11-11-20 @ 06:07) (112/58 - 155/77)  RR: 16 (11-11-20 @ 06:07) (14 - 18)  SpO2: 94% (11-11-20 @ 06:07) (94% - 100%)  Wt(kg): --    Weight (kg): 99.79 (11-10-20 @ 06:23)      11-10-20 @ 07:01  -  11-11-20 @ 07:00  --------------------------------------------------------  IN: 400 mL / OUT: 500 mL / NET: -100 mL      Physical Exam:              Gen: NAD,  	HEENT: PERRL, supple neck, clear oropharynx  	Pulm: CTA B/L  	CV: RRR, S1S2; no rub  	Back: No spinal or CVA tenderness; no sacral edema  	Abd: +BS, soft, nontender/nondistended  	: No suprapubic tenderness  	UE: Warm, no edema; no asterixis  	LE: Warm,  no edema  	Neuro: No focal deficits, intact gait  	Psych: Normal affect and mood  	Skin: Warm, without rashes  	V[facility]: [facility]    LABS/STUDIES  --------------------------------------------------------------------------------              8.7    8.19  >-----------<  201      [11-11-20 @ 06:58]              28.3     139  |  105  |  28.0  ----------------------------<  112      [11-11-20 @ 06:58]  3.6   |  25.0  |  2.34        Ca     8.1     [11-11-20 @ 06:58]      Mg     1.9     [11-11-20 @ 06:58]      Phos  3.6     [11-11-20 @ 06:58]    TPro  5.5  /  Alb  2.7  /  TBili  0.3  /  DBili  x   /  AST  12  /  ALT  11  /  AlkPhos  58  [11-11-20 @ 06:58]          Creatinine Trend:  SCr 2.34 [11-11 @ 06:58]  SCr 2.68 [11-10 @ 06:10]  SCr 2.66 [11-09 @ 06:45]  SCr 2.70 [11-08 @ 21:52]  SCr 2.53 [11-08 @ 06:23]        Iron 29, TIBC 332, %sat 9      [11-03-20 @ 08:18]  Ferritin 230      [11-03-20 @ 08:18]  TSH 1.95      [11-03-20 @ 08:18]  Lipid: chol 154, , HDL 44, LDL --      [11-03-20 @ 08:18]    HBsAb <3.0      [11-07-20 @ 05:41]  HBsAg Nonreact      [11-07-20 @ 05:41]  HBcAb Nonreact      [11-07-20 @ 05:41]  HCV 0.07, Nonreact      [11-07-20 @ 05:41]

## 2020-11-11 NOTE — PROGRESS NOTE ADULT - PROBLEM SELECTOR PLAN 1
Preop workup in progress.  Surgery likely planned for Friday 11/13.  Cardiac Cath done yesterday.  2 V CAD.  CTA Head and neck today pending IV access.  Vit K started per Dr. Butler.  potassium repleted.  Mag repleted.  Psych consult called for anxiety/anger.

## 2020-11-11 NOTE — PROGRESS NOTE ADULT - ASSESSMENT
61 y/o M with PMH of CAD s/p stent in LAD, leaky aortic valve, uncontrolled bp, s/p aortic dissection repair 2010, CVA left sided residual weakness, s/p colostomy with reversal, presents from home with sob and LE edema. Echo indicating EF 55% moderate severe, MR, Moderate TR, mod-sever AI, grade II diastolic dysfunction. SANTOS/CKD receiving HD via right IJ permacath. Pt getting EGD and Coloscopy 11/9, 2/2 to anemia. Plan for Cath 11/10 with Dr. Lewis follnelidaed by HD as per renal.

## 2020-11-12 ENCOUNTER — TRANSCRIPTION ENCOUNTER (OUTPATIENT)
Age: 60
End: 2020-11-12

## 2020-11-12 LAB
ALBUMIN SERPL ELPH-MCNC: 2.9 G/DL — LOW (ref 3.3–5.2)
ALP SERPL-CCNC: 60 U/L — SIGNIFICANT CHANGE UP (ref 40–120)
ALT FLD-CCNC: 11 U/L — SIGNIFICANT CHANGE UP
ANION GAP SERPL CALC-SCNC: 15 MMOL/L — SIGNIFICANT CHANGE UP (ref 5–17)
ANION GAP SERPL CALC-SCNC: 9 MMOL/L — SIGNIFICANT CHANGE UP (ref 5–17)
APTT BLD: 34.9 SEC — SIGNIFICANT CHANGE UP (ref 27.5–35.5)
AST SERPL-CCNC: 13 U/L — SIGNIFICANT CHANGE UP
BILIRUB SERPL-MCNC: 0.4 MG/DL — SIGNIFICANT CHANGE UP (ref 0.4–2)
BLD GP AB SCN SERPL QL: SIGNIFICANT CHANGE UP
BUN SERPL-MCNC: 24 MG/DL — HIGH (ref 8–20)
BUN SERPL-MCNC: 42 MG/DL — HIGH (ref 8–20)
CALCIUM SERPL-MCNC: 8.1 MG/DL — LOW (ref 8.6–10.2)
CALCIUM SERPL-MCNC: 8.2 MG/DL — LOW (ref 8.6–10.2)
CHLORIDE SERPL-SCNC: 101 MMOL/L — SIGNIFICANT CHANGE UP (ref 98–107)
CHLORIDE SERPL-SCNC: 102 MMOL/L — SIGNIFICANT CHANGE UP (ref 98–107)
CO2 SERPL-SCNC: 22 MMOL/L — SIGNIFICANT CHANGE UP (ref 22–29)
CO2 SERPL-SCNC: 28 MMOL/L — SIGNIFICANT CHANGE UP (ref 22–29)
CREAT SERPL-MCNC: 1.96 MG/DL — HIGH (ref 0.5–1.3)
CREAT SERPL-MCNC: 3.09 MG/DL — HIGH (ref 0.5–1.3)
GLUCOSE SERPL-MCNC: 105 MG/DL — HIGH (ref 70–99)
GLUCOSE SERPL-MCNC: 126 MG/DL — HIGH (ref 70–99)
HCT VFR BLD CALC: 26.2 % — LOW (ref 39–50)
HGB BLD-MCNC: 8.1 G/DL — LOW (ref 13–17)
INR BLD: 1.17 RATIO — HIGH (ref 0.88–1.16)
MAGNESIUM SERPL-MCNC: 2 MG/DL — SIGNIFICANT CHANGE UP (ref 1.6–2.6)
MAGNESIUM SERPL-MCNC: 2.3 MG/DL — SIGNIFICANT CHANGE UP (ref 1.8–2.6)
MCHC RBC-ENTMCNC: 29.2 PG — SIGNIFICANT CHANGE UP (ref 27–34)
MCHC RBC-ENTMCNC: 30.9 GM/DL — LOW (ref 32–36)
MCV RBC AUTO: 94.6 FL — SIGNIFICANT CHANGE UP (ref 80–100)
PHOSPHATE SERPL-MCNC: 4.3 MG/DL — SIGNIFICANT CHANGE UP (ref 2.4–4.7)
PLATELET # BLD AUTO: 181 K/UL — SIGNIFICANT CHANGE UP (ref 150–400)
POTASSIUM SERPL-MCNC: 3.4 MMOL/L — LOW (ref 3.5–5.3)
POTASSIUM SERPL-MCNC: 3.9 MMOL/L — SIGNIFICANT CHANGE UP (ref 3.5–5.3)
POTASSIUM SERPL-SCNC: 3.4 MMOL/L — LOW (ref 3.5–5.3)
POTASSIUM SERPL-SCNC: 3.9 MMOL/L — SIGNIFICANT CHANGE UP (ref 3.5–5.3)
PROT SERPL-MCNC: 6 G/DL — LOW (ref 6.6–8.7)
PROTHROM AB SERPL-ACNC: 13.5 SEC — SIGNIFICANT CHANGE UP (ref 10.6–13.6)
RBC # BLD: 2.77 M/UL — LOW (ref 4.2–5.8)
RBC # FLD: 15.4 % — HIGH (ref 10.3–14.5)
SODIUM SERPL-SCNC: 138 MMOL/L — SIGNIFICANT CHANGE UP (ref 135–145)
SODIUM SERPL-SCNC: 139 MMOL/L — SIGNIFICANT CHANGE UP (ref 135–145)
WBC # BLD: 8.27 K/UL — SIGNIFICANT CHANGE UP (ref 3.8–10.5)
WBC # FLD AUTO: 8.27 K/UL — SIGNIFICANT CHANGE UP (ref 3.8–10.5)

## 2020-11-12 PROCEDURE — 70496 CT ANGIOGRAPHY HEAD: CPT | Mod: 26

## 2020-11-12 PROCEDURE — 90937 HEMODIALYSIS REPEATED EVAL: CPT

## 2020-11-12 PROCEDURE — 71045 X-RAY EXAM CHEST 1 VIEW: CPT | Mod: 26

## 2020-11-12 PROCEDURE — 70498 CT ANGIOGRAPHY NECK: CPT | Mod: 26

## 2020-11-12 RX ORDER — POTASSIUM CHLORIDE 20 MEQ
40 PACKET (EA) ORAL ONCE
Refills: 0 | Status: COMPLETED | OUTPATIENT
Start: 2020-11-12 | End: 2020-11-12

## 2020-11-12 RX ORDER — LANOLIN ALCOHOL/MO/W.PET/CERES
5 CREAM (GRAM) TOPICAL ONCE
Refills: 0 | Status: COMPLETED | OUTPATIENT
Start: 2020-11-12 | End: 2020-11-12

## 2020-11-12 RX ORDER — ALPRAZOLAM 0.25 MG
0.5 TABLET ORAL ONCE
Refills: 0 | Status: DISCONTINUED | OUTPATIENT
Start: 2020-11-12 | End: 2020-11-12

## 2020-11-12 RX ORDER — ALPRAZOLAM 0.25 MG
0.25 TABLET ORAL ONCE
Refills: 0 | Status: DISCONTINUED | OUTPATIENT
Start: 2020-11-12 | End: 2020-11-12

## 2020-11-12 RX ADMIN — LEVETIRACETAM 750 MILLIGRAM(S): 250 TABLET, FILM COATED ORAL at 05:10

## 2020-11-12 RX ADMIN — Medication 75 MILLIGRAM(S): at 05:09

## 2020-11-12 RX ADMIN — Medication 0.25 MILLIGRAM(S): at 14:29

## 2020-11-12 RX ADMIN — Medication 0.5 MILLIGRAM(S): at 21:36

## 2020-11-12 RX ADMIN — CARVEDILOL PHOSPHATE 25 MILLIGRAM(S): 80 CAPSULE, EXTENDED RELEASE ORAL at 05:09

## 2020-11-12 RX ADMIN — Medication 0.5 MILLIGRAM(S): at 01:46

## 2020-11-12 RX ADMIN — Medication 5 MILLIGRAM(S): at 05:08

## 2020-11-12 RX ADMIN — LEVETIRACETAM 750 MILLIGRAM(S): 250 TABLET, FILM COATED ORAL at 17:51

## 2020-11-12 RX ADMIN — Medication 40 MILLIEQUIVALENT(S): at 21:37

## 2020-11-12 RX ADMIN — Medication 81 MILLIGRAM(S): at 09:26

## 2020-11-12 RX ADMIN — Medication 650 MILLIGRAM(S): at 05:10

## 2020-11-12 RX ADMIN — Medication 5 MILLIGRAM(S): at 11:55

## 2020-11-12 RX ADMIN — Medication 75 MILLIGRAM(S): at 21:36

## 2020-11-12 RX ADMIN — IRON SUCROSE 110 MILLIGRAM(S): 20 INJECTION, SOLUTION INTRAVENOUS at 14:26

## 2020-11-12 RX ADMIN — CHLORHEXIDINE GLUCONATE 15 MILLILITER(S): 213 SOLUTION TOPICAL at 05:10

## 2020-11-12 RX ADMIN — Medication 5 MILLIGRAM(S): at 21:37

## 2020-11-12 RX ADMIN — CHLORHEXIDINE GLUCONATE 15 MILLILITER(S): 213 SOLUTION TOPICAL at 21:37

## 2020-11-12 RX ADMIN — CARVEDILOL PHOSPHATE 25 MILLIGRAM(S): 80 CAPSULE, EXTENDED RELEASE ORAL at 17:50

## 2020-11-12 RX ADMIN — Medication 650 MILLIGRAM(S): at 21:37

## 2020-11-12 RX ADMIN — ATORVASTATIN CALCIUM 40 MILLIGRAM(S): 80 TABLET, FILM COATED ORAL at 21:36

## 2020-11-12 RX ADMIN — CHLORHEXIDINE GLUCONATE 1 APPLICATION(S): 213 SOLUTION TOPICAL at 21:30

## 2020-11-12 RX ADMIN — CHLORHEXIDINE GLUCONATE 1 APPLICATION(S): 213 SOLUTION TOPICAL at 05:09

## 2020-11-12 RX ADMIN — PANTOPRAZOLE SODIUM 40 MILLIGRAM(S): 20 TABLET, DELAYED RELEASE ORAL at 06:16

## 2020-11-12 RX ADMIN — Medication 1 MILLIGRAM(S): at 09:26

## 2020-11-12 NOTE — CHART NOTE - NSCHARTNOTEFT_GEN_A_CORE
Attempted eval today due to "bizarre behavior and per record anxiety and agitation, but Pt declined.  He states he is OK now and denies depressed mood.  He is somewhat apologetic for behavior, claiming the "staff don't deserve it".  Pt reports sleep is an issues and his anxiety and requests something for sleep and anxiety. Pt declined eval but would benefit for in house only, sedative and sleep aid.  No evidence of agitation or anxiety during brief conversation.  Contact psych if Pt changes his mind regarding eval.

## 2020-11-12 NOTE — PROGRESS NOTE ADULT - SUBJECTIVE AND OBJECTIVE BOX
[facility] DIVISION OF KIDNEY DISEASES AND HYPERTENSION -- HEMODIALYSIS NOTE  --------------------------------------------------------------------------------  Chief Complaint: ESRD/Ongoing hemodialysis requirement    24 hour events/subjective:        PAST HISTORY  --------------------------------------------------------------------------------  No significant changes to PMH, PSH, FHx, SHx, unless otherwise noted    ALLERGIES & MEDICATIONS  --------------------------------------------------------------------------------  Allergies    penicillin (Unknown)    Intolerances      Standing Inpatient Medications  ALPRAZolam 0.5 milliGRAM(s) Oral at bedtime  aspirin enteric coated 81 milliGRAM(s) Oral daily  atorvastatin 40 milliGRAM(s) Oral at bedtime  carvedilol 25 milliGRAM(s) Oral every 12 hours  chlorhexidine 0.12% Liquid 15 milliLiter(s) Swish and Spit two times a day  chlorhexidine 4% Liquid 1 Application(s) Topical two times a day  epoetin yolanda-epbx (RETACRIT) Injectable 06356 Unit(s) SubCutaneous every 7 days  folic acid 1 milliGRAM(s) Oral daily  hydrALAZINE 75 milliGRAM(s) Oral three times a day  influenza   Vaccine 0.5 milliLiter(s) IntraMuscular once  iron sucrose IVPB 200 milliGRAM(s) IV Intermittent every 48 hours  levETIRAcetam 750 milliGRAM(s) Oral two times a day  melatonin 5 milliGRAM(s) Oral at bedtime  pantoprazole    Tablet 40 milliGRAM(s) Oral before breakfast  phytonadione   Solution 5 milliGRAM(s) Oral daily  senna 2 Tablet(s) Oral at bedtime  sodium bicarbonate 650 milliGRAM(s) Oral three times a day    PRN Inpatient Medications  albuterol/ipratropium for Nebulization 3 milliLiter(s) Nebulizer every 6 hours PRN  nitroglycerin     SubLingual 0.4 milliGRAM(s) SubLingual every 5 minutes PRN      REVIEW OF SYSTEMS  --------------------------------------------------------------------------------  Gen: No weight changes, fatigue, fevers/chills, weakness  Skin: No rashes  Head/Eyes/Ears/Mouth: No headache  Respiratory: No dyspnea, cough,  CV: No chest pain, orthopnea  GI: No abdominal pain, diarrhea, constipation, nausea, vomiting,  MSK: No joint pain  Neuro: No dizziness/lightheadedness, weakness  Heme: No bleeding  Psych: No significant nervousness, anxiety, stress, depression    All other systems were reviewed and are negative, except as noted.    VITALS/PHYSICAL EXAM  --------------------------------------------------------------------------------  T(C): 36.6 (11-12-20 @ 05:17), Max: 36.6 (11-11-20 @ 21:20)  HR: 77 (11-12-20 @ 05:17) (74 - 77)  BP: 150/77 (11-12-20 @ 12:17) (101/50 - 150/77)  RR: 18 (11-12-20 @ 05:17) (18 - 18)  SpO2: 95% (11-12-20 @ 05:17) (94% - 95%)  Wt(kg): --        11-11-20 @ 07:01  -  11-12-20 @ 07:00  --------------------------------------------------------  IN: 240 mL / OUT: 350 mL / NET: -110 mL      Physical Exam:  	Gen: NAD, well-appearing  	HEENT: PERRL, supple neck,  	Pulm: CTA B/L  	CV: RRR, S1S2; no rub  	Abd: +BS, soft, nontender  	UE: Warm, intact strength; no asterixis  	LE: Warm, + edema  	Neuro: No focal deficits  	Psych: Normal affect and mood  	Skin: Warm, without rashes  	Vascular access:    LABS/STUDIES  --------------------------------------------------------------------------------              8.1    8.27  >-----------<  181      [11-12-20 @ 07:42]              26.2     139  |  102  |  42.0  ----------------------------<  105      [11-12-20 @ 07:42]  3.9   |  22.0  |  3.09        Ca     8.1     [11-12-20 @ 07:42]      Mg     2.3     [11-12-20 @ 07:42]      Phos  4.3     [11-12-20 @ 07:42]    TPro  6.0  /  Alb  2.9  /  TBili  0.4  /  DBili  x   /  AST  13  /  ALT  11  /  AlkPhos  60  [11-12-20 @ 07:42]    PT/INR: PT 13.5 , INR 1.17       [11-12-20 @ 07:42]  PTT: 34.9       [11-12-20 @ 07:42]      Iron 29, TIBC 332, %sat 9      [11-03-20 @ 08:18]  Ferritin 230      [11-03-20 @ 08:18]  TSH 1.95      [11-03-20 @ 08:18]  Lipid: chol 154, , HDL 44, LDL --      [11-03-20 @ 08:18]    HBsAb <3.0      [11-07-20 @ 05:41]  HBsAg Nonreact      [11-07-20 @ 05:41]  HBcAb Nonreact      [11-07-20 @ 05:41]  HCV 0.07, Nonreact      [11-07-20 @ 05:41]   [facility] DIVISION OF KIDNEY DISEASES AND HYPERTENSION -- HEMODIALYSIS NOTE  --------------------------------------------------------------------------------  Chief Complaint: Martin/Ongoing hemodialysis requirement    24 hour events/subjective:  no acute event  pt seen and examined; he is irritable; not answering questions      PAST HISTORY  --------------------------------------------------------------------------------  No significant changes to PMH, PSH, FHx, SHx, unless otherwise noted    ALLERGIES & MEDICATIONS  --------------------------------------------------------------------------------  Allergies    penicillin (Unknown)    Intolerances      Standing Inpatient Medications  ALPRAZolam 0.5 milliGRAM(s) Oral at bedtime  aspirin enteric coated 81 milliGRAM(s) Oral daily  atorvastatin 40 milliGRAM(s) Oral at bedtime  carvedilol 25 milliGRAM(s) Oral every 12 hours  chlorhexidine 0.12% Liquid 15 milliLiter(s) Swish and Spit two times a day  chlorhexidine 4% Liquid 1 Application(s) Topical two times a day  epoetin yolanda-epbx (RETACRIT) Injectable 97290 Unit(s) SubCutaneous every 7 days  folic acid 1 milliGRAM(s) Oral daily  hydrALAZINE 75 milliGRAM(s) Oral three times a day  influenza   Vaccine 0.5 milliLiter(s) IntraMuscular once  iron sucrose IVPB 200 milliGRAM(s) IV Intermittent every 48 hours  levETIRAcetam 750 milliGRAM(s) Oral two times a day  melatonin 5 milliGRAM(s) Oral at bedtime  pantoprazole    Tablet 40 milliGRAM(s) Oral before breakfast  phytonadione   Solution 5 milliGRAM(s) Oral daily  senna 2 Tablet(s) Oral at bedtime  sodium bicarbonate 650 milliGRAM(s) Oral three times a day    PRN Inpatient Medications  albuterol/ipratropium for Nebulization 3 milliLiter(s) Nebulizer every 6 hours PRN  nitroglycerin     SubLingual 0.4 milliGRAM(s) SubLingual every 5 minutes PRN      REVIEW OF SYSTEMS  --------------------------------------------------------------------------------  unable to obtain    VITALS/PHYSICAL EXAM  --------------------------------------------------------------------------------  T(C): 36.6 (11-12-20 @ 05:17), Max: 36.6 (11-11-20 @ 21:20)  HR: 77 (11-12-20 @ 05:17) (74 - 77)  BP: 150/77 (11-12-20 @ 12:17) (101/50 - 150/77)  RR: 18 (11-12-20 @ 05:17) (18 - 18)  SpO2: 95% (11-12-20 @ 05:17) (94% - 95%)  Wt(kg): --        11-11-20 @ 07:01  -  11-12-20 @ 07:00  --------------------------------------------------------  IN: 240 mL / OUT: 350 mL / NET: -110 mL      Physical Exam:  	Gen: NAD  	HEENT: PERRL, supple neck  	Pulm: CTA B/L  	CV: RRR, S1S2; no rub  	Abd: +BS, soft, nontender  	UE: Warm  	LE: Warm  	Neuro: No focal deficits  	Psych: Normal affect   	Skin: Warm, without rashes  	Vascular access: RIJ non tunneled HD catheter    LABS/STUDIES  --------------------------------------------------------------------------------              8.1    8.27  >-----------<  181      [11-12-20 @ 07:42]              26.2     139  |  102  |  42.0  ----------------------------<  105      [11-12-20 @ 07:42]  3.9   |  22.0  |  3.09        Ca     8.1     [11-12-20 @ 07:42]      Mg     2.3     [11-12-20 @ 07:42]      Phos  4.3     [11-12-20 @ 07:42]    TPro  6.0  /  Alb  2.9  /  TBili  0.4  /  DBili  x   /  AST  13  /  ALT  11  /  AlkPhos  60  [11-12-20 @ 07:42]    PT/INR: PT 13.5 , INR 1.17       [11-12-20 @ 07:42]  PTT: 34.9       [11-12-20 @ 07:42]      Iron 29, TIBC 332, %sat 9      [11-03-20 @ 08:18]  Ferritin 230      [11-03-20 @ 08:18]  TSH 1.95      [11-03-20 @ 08:18]  Lipid: chol 154, , HDL 44, LDL --      [11-03-20 @ 08:18]    HBsAb <3.0      [11-07-20 @ 05:41]  HBsAg Nonreact      [11-07-20 @ 05:41]  HBcAb Nonreact      [11-07-20 @ 05:41]  HCV 0.07, Nonreact      [11-07-20 @ 05:41]

## 2020-11-12 NOTE — PROGRESS NOTE ADULT - SUBJECTIVE AND OBJECTIVE BOX
[facility], [facility]                                          [address] , [address], [place] [address]                                               PHONE: [phone]    FAX: [phone] [address], [address], [place] [address]                                       PHONE: [phone]    FAX: [phone]  *******************************************************************************    Overnight events/Subjective Assessment:  No overnight cardiac complaints or events.  No CP, palpitations, SOB.  Awaiting OHS tomorrow.    INTERPRETATION OF TELEMETRY (personally reviewed): SR 60-70s, PVCs, couplets, bigeminy    penicillin (Unknown)    MEDICATIONS  (STANDING):  ALPRAZolam 0.5 milliGRAM(s) Oral at bedtime  aspirin enteric coated 81 milliGRAM(s) Oral daily  atorvastatin 40 milliGRAM(s) Oral at bedtime  carvedilol 25 milliGRAM(s) Oral every 12 hours  chlorhexidine 0.12% Liquid 15 milliLiter(s) Swish and Spit two times a day  chlorhexidine 4% Liquid 1 Application(s) Topical two times a day  epoetin yolanda-epbx (RETACRIT) Injectable 75516 Unit(s) SubCutaneous every 7 days  folic acid 1 milliGRAM(s) Oral daily  hydrALAZINE 75 milliGRAM(s) Oral three times a day  influenza   Vaccine 0.5 milliLiter(s) IntraMuscular once  iron sucrose IVPB 200 milliGRAM(s) IV Intermittent every 48 hours  levETIRAcetam 750 milliGRAM(s) Oral two times a day  melatonin 5 milliGRAM(s) Oral at bedtime  pantoprazole    Tablet 40 milliGRAM(s) Oral before breakfast  phytonadione   Solution 5 milliGRAM(s) Oral daily  senna 2 Tablet(s) Oral at bedtime  sodium bicarbonate 650 milliGRAM(s) Oral three times a day    MEDICATIONS  (PRN):  albuterol/ipratropium for Nebulization 3 milliLiter(s) Nebulizer every 6 hours PRN Bronchospasm  nitroglycerin     SubLingual 0.4 milliGRAM(s) SubLingual every 5 minutes PRN Chest Pain      Vital Signs Last 24 Hrs  T(C): 36.6 (12 Nov 2020 05:17), Max: 36.6 (11 Nov 2020 10:31)  T(F): 97.9 (12 Nov 2020 05:17), Max: 97.9 (12 Nov 2020 05:17)  HR: 77 (12 Nov 2020 05:17) (74 - 79)  BP: 120/53 (12 Nov 2020 05:17) (94/54 - 138/71)  BP(mean): --  RR: 18 (12 Nov 2020 05:17) (18 - 18)  SpO2: 95% (12 Nov 2020 05:17) (94% - 95%)    I&O's Detail    11 Nov 2020 07:01  -  12 Nov 2020 07:00  --------------------------------------------------------  IN:    Oral Fluid: 240 mL  Total IN: 240 mL    OUT:    Voided (mL): 350 mL  Total OUT: 350 mL    Total NET: -110 mL        I&O's Summary    11 Nov 2020 07:01  -  12 Nov 2020 07:00  --------------------------------------------------------  IN: 240 mL / OUT: 350 mL / NET: -110 mL            PHYSICAL EXAM:  General: Appears well developed, well nourished, no acute distress, laying flat  HEENT: Head: normocephalic, atraumatic  Eyes: Pupils equal and reactive  Neck: Supple, no carotid bruit, no JVD, no HJR  CARDIOVASCULAR: Normal S1 and S2, 2/6 systolic murmur at base and apex  LUNGS: Clear to auscultation bilaterally, no rales, rhonchi or wheeze  ABDOMEN: Soft, nontender, non-distended, positive bowel sounds, no mass or bruit  EXTREMITIES: no LE edema, distal pulses WNL, right groin with mild bruising, no hematoma  SKIN: Warm and dry with normal turgor  NEURO: Alert & oriented x 3, grossly intact  PSYCH: normal mood and affect    LABS:                        8.1    8.27  )-----------( 181      ( 12 Nov 2020 07:42 )             26.2     11-12    139  |  102  |  42.0<H>  ----------------------------<  105<H>  3.9   |  22.0  |  3.09<H>    Ca    8.1<L>      12 Nov 2020 07:42  Phos  4.3     11-12  Mg     2.3     11-12    TPro  6.0<L>  /  Alb  2.9<L>  /  TBili  0.4  /  DBili  x   /  AST  13  /  ALT  11  /  AlkPhos  60  11-12        PT/INR - ( 12 Nov 2020 07:42 )   PT: 13.5 sec;   INR: 1.17 ratio         PTT - ( 12 Nov 2020 07:42 )  PTT:34.9 sec  serum  Lipids:     Thyroid Stimulating Hormone, Serum: 1.95 uIU/mL (11-03 @ 08:18)      RADIOLOGY & ADDITIONAL STUDIES:  INTERPRETATION OF TELEMETRY (personally reviewed): SR in 70s with PVCs    < from: US Duplex Carotid Arteries Complete, Bilateral (11.05.20 @ 16:51) >  IMPRESSION:    Moderate plaque in the right carotid bulb with elevated peak systolic and end-diastolic velocities which overall are suggestive of a 50-69% stenosis as described above, however given the peak systolic velocity is suggestive of a greater than 70% stenosis, aCTA of the neck is recommended for further evaluation.    Mild to moderate plaque in the left carotid bulb with less than 50% left internal carotid artery stenosis.      < end of copied text >    ECHO:  < from: TTE Echo Complete w/ Contrast w/ Doppler (11.04.20 @ 08:37) >    PHYSICIAN INTERPRETATION:  Left Ventricle: Left ventricular wall thickness is moderately increased.  Global LV systolic function was mildly decreased. Left ventricular ejection fraction, by visual estimation, is 50 to 55%. Spectral Doppler shows pseudonormal pattern of left ventricular myocardial filling (Grade II diastolic dysfunction).  Right Ventricle: The right ventricular size is normal. RV systolic function is mildly reduced.  Left Atrium: Severely enlarged left atrium.  Right Atrium: Mildly enlarged right atrium.  Pericardium: Trivial pericardial effusion is present. The pericardial effusion is posterior to the left ventricle.  Mitral Valve: Thickening of the anterior and posterior mitral valve leaflets. There is mild mitral annular calcification. Moderate to severe mitral valve regurgitation is seen.  Tricuspid Valve: Moderate tricuspid regurgitation is visualized. Estimated pulmonary artery systolic pressure is 43.8 mmHg assuming a right atrial pressure of 8 mmHg, which is consistent with mild pulmonary hypertension.  Aortic Valve: Peak transaortic gradient equals 4.4 mmHg, mean transaortic gradient equals 2.3 mmHg, the calculated aortic valve area equals 3.18 cm² by the continuity equation consistent with normally opening aortic valve. Moderate to severe aortic valve regurgitation is seen.  Pulmonic Valve: Mild pulmonic valve regurgitation.  Aorta: There is dilatation of the aortic root. Aortic Root measuring 4.2 cm.  Pulmonary Artery: The main pulmonary artery is normal in size.  Venous: The inferior vena cava was dilated, with respiratory size variation greater than 50%.      Summary:   1. Left ventricular ejection fraction, by visual estimation, is 50 to 55%.   2. Mildly decreased global left ventricular systolic function.   3. Severely enlarged left atrium.   4. Moderately increased LV wall thickness.   5. Spectral Doppler shows pseudonormal pattern of left ventricular myocardial filling (Grade II diastolic dysfunction).   6. Mildly reduced RV systolic function.   7. Mildly enlarged right atrium.   8. Mild mitral annular calcification.   9. Moderate to severe mitral valve regurgitation.  10. Thickening of the anterior and posterior mitral valve leaflets.  11. Moderate tricuspid regurgitation.  12. Moderate to severe aortic regurgitation.  13. Mild pulmonic valve regurgitation.  14. Dilatation of the aortic root.  15. Aortic Root measuring 4.2 cm.  16. Estimated pulmonary artery systolic pressure is 43.8 mmHg assuming a right atrial pressure of 8 mmHg, which is consistent with mild pulmonary hypertension.    < from: Cardiac Cath Lab - Adult (11.10.20 @ 07:43) >  DIAGNOSTIC IMPRESSIONS: - Patent ascending aortic graft  - Severe 2 vessel CAD with chronically occluded proximal RCA. There are   brisk collaterals from the LCA.  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, subclavian   artery, arch, and descending aorta  - Low normal LV systolic function with moderate to severe AI and MR (best   seen on echo)  - The IMAs are both patent  - Cardiac output 5.1 LPM; Cardiac index 2.5  - Markedly elevated LVEDP = 38mmHg    < end of copied text >      ASSESSMENT AND PLAN:  In summary, JONATHAN GIBSON is a 60y man with HTN, HLD, CAD, ascending aortic dissection s/p repair (2001), chronic diastolic HFpEF, moderate valvular heart disease including moderate aortic and mitral regurgitation, admitted for evaluation of shortness of breath, found to have new anemia and SANTOS in the setting of markedly elevated BP, incidentally found to have mildly elevated troponin.      - The patient has been chest pain free since admission.  Mildly elevated initial troponin is nonspecific, especially in the setting of SANTOS.  Nothing to suggest acute MI.  Would repeat ECG should symptoms recur.   - Echocardiogram 11/4/20 EF 50-55%, grade II diastolic dysfunction, mildly reduced RV systolic function, severe LAE, mild KOLTON, mod-severe MR and AI, mod TR, dilated aortic root @ 4.2cm  - Mild HF symptoms (acute on chronic diastolic/valvular HF) on admission improving following IV lasix.  Renal function worsened with diuresis and is improving with discontinuation of diuretic.  Further improvement noted with initiation of HD and fluid removal.  - Rhythm/hemodynamics stable: BP markedly elevated on admission now improved following restarting of PO regimen.  Continue Coreg 25mg BID and Hydralazine 75mg TID; titrate prn.  Avoid nephrotoxic agents  - CTA of the chest reviewed.  He has stable residual type B aortic dissection extending to the abdominal aorta, left subclavian artery and innominate artery.  Significant coronary artery calcification seen.  - Acute anemia, probably related to renal insufficiency as GI work up essentially unrevealing  - ASA 81mg daily needs to be continued in an uninterrupted fashion given CAD with prior coronary stents.  - HLD: continue lipitor 40   - Carotid US 11/5/20 Moderate, possibly > 70% VANESSA stenosis, < 50% LICA stenosis.  Continue ASA and statin.  CTA of the carotids pending.  - Keep K > 4, Mg > 2  - Cardiac cath 11/10/20: severe 2 vessel CAD.  Chronically occluded proximal RCA with collaterals from the LCA.  EF 50%.  Elevated LVEDP.  - Discussed with Dr. Butler.  Will be for valve surgery and CABG, tentatively tomorrow.    Celso Stockton MD

## 2020-11-12 NOTE — PROGRESS NOTE ADULT - SUBJECTIVE AND OBJECTIVE BOX
Cardiac Surgery Pre-op Note:    CC: Patient is a 60y old  Male who presents with a chief complaint of sob (12 Nov 2020 12:36)                                                                                                             Surgeon:    Procedure:     Allergies    penicillin (Unknown)    Intolerances        HPI:  Patient is a 60 yoM with PMH of CAD s/p stent in LAD, leaky aortic valve, uncontrolled bp, thoracic aortic dissection requiring emergent surgery, CVA with left sided sensory loss, colostomy for bowel ischemia s/p reversal, psoriasis on Humira injections presents from home with sob and le edema. Patient states the past several weeks, he has not been taking his medications because he forgot. Patient states the sob has gotten worse over the past few days and also associated with chest pressure. Patient also hasnt seen his cardiologist in several months. Patient worked up in the er and had an elevated bnp and htn urgency and eric. Patient seen at bedside and states feeling a little better.  (02 Nov 2020 14:32)      PAST MEDICAL & SURGICAL HISTORY:  CVA (cerebral vascular accident)    CHF (congestive heart failure)    H/O colectomy    Aorta disorder        MEDICATIONS  (STANDING):  ALPRAZolam 0.5 milliGRAM(s) Oral at bedtime  aspirin enteric coated 81 milliGRAM(s) Oral daily  atorvastatin 40 milliGRAM(s) Oral at bedtime  carvedilol 25 milliGRAM(s) Oral every 12 hours  chlorhexidine 0.12% Liquid 15 milliLiter(s) Swish and Spit two times a day  chlorhexidine 4% Liquid 1 Application(s) Topical two times a day  epoetin yolanda-epbx (RETACRIT) Injectable 39769 Unit(s) SubCutaneous every 7 days  folic acid 1 milliGRAM(s) Oral daily  hydrALAZINE 75 milliGRAM(s) Oral three times a day  influenza   Vaccine 0.5 milliLiter(s) IntraMuscular once  levETIRAcetam 750 milliGRAM(s) Oral two times a day  melatonin 5 milliGRAM(s) Oral at bedtime  pantoprazole    Tablet 40 milliGRAM(s) Oral before breakfast  phytonadione   Solution 5 milliGRAM(s) Oral daily  senna 2 Tablet(s) Oral at bedtime  sodium bicarbonate 650 milliGRAM(s) Oral three times a day    MEDICATIONS  (PRN):  albuterol/ipratropium for Nebulization 3 milliLiter(s) Nebulizer every 6 hours PRN Bronchospasm  nitroglycerin     SubLingual 0.4 milliGRAM(s) SubLingual every 5 minutes PRN Chest Pain        Labs:                        8.1    8.27  )-----------( 181      ( 12 Nov 2020 07:42 )             26.2     11-12    139  |  102  |  42.0<H>  ----------------------------<  105<H>  3.9   |  22.0  |  3.09<H>    Ca    8.1<L>      12 Nov 2020 07:42  Phos  4.3     11-12  Mg     2.3     11-12    TPro  6.0<L>  /  Alb  2.9<L>  /  TBili  0.4  /  DBili  x   /  AST  13  /  ALT  11  /  AlkPhos  60  11-12    PT/INR - ( 12 Nov 2020 07:42 )   PT: 13.5 sec;   INR: 1.17 ratio         PTT - ( 12 Nov 2020 07:42 )  PTT:34.9 sec    Blood Type:   HGB A1C:   Prealbumin:   Pro-BNP:   Thyroid Panel:   MRSA:  / MSSA:       CXR:     EKG:    Carotid Duplex:      PFT's:    Echocardiogram:    Cardiac catheterization:    Vein Mapping:    Gen: WN/WD NAD  Neuro: AAOx3, nonfocal  Pulm: CTA B/L  CV: RRR, S1S2  Abd: Soft, NT, ND +BS  Ext: No edema, + peripheral pulses      Pt has AICD/PPM [ ] Yes  [ ] No             Brand Name:  Pre-op Beta Blocker ordered within 24 hrs of surgery?  [ ] Yes  [ ] No  If not, Why?  Type & Cross  [ ] Yes  [ ] No  NPO after Midnight [ ] Yes  [ ] No  Pre-op ABX ordered, to be taped on chart:  [ ] Yes  [ ] No     Hibiclens/Peridex ordered [ ] Yes  [ ] No  Intraop on Hold: PRBCs, CXR, MUSTAPHA [ ]   Consent obtained  [ ] Yes  [ ] No   Cardiac Surgery Pre-op Note:    CC: Patient is a 60y old  Male who presents with a chief complaint of sob (12 Nov 2020 12:36)                                                                                                             Surgeon: Luke    Procedure: Reop CABG  MVR  AVR      Allergies    penicillin (Unknown)    Intolerances        HPI:  Patient is a 60 yoM with PMH of CAD s/p stent in LAD, leaky aortic valve, uncontrolled bp, thoracic aortic dissection requiring emergent surgery, CVA with left sided sensory loss, colostomy for bowel ischemia s/p reversal, psoriasis on Humira injections presents from home with sob and le edema. Patient states the past several weeks, he has not been taking his medications because he forgot. Patient states the sob has gotten worse over the past few days and also associated with chest pressure. Patient also hasnt seen his cardiologist in several months. Patient worked up in the er and had an elevated bnp and htn urgency and eric. Patient seen at bedside and states feeling a little better.  (02 Nov 2020 14:32)      PAST MEDICAL & SURGICAL HISTORY:  CVA (cerebral vascular accident)    CHF (congestive heart failure)    H/O colectomy    Aorta disorder        MEDICATIONS  (STANDING):  ALPRAZolam 0.5 milliGRAM(s) Oral at bedtime  aspirin enteric coated 81 milliGRAM(s) Oral daily  atorvastatin 40 milliGRAM(s) Oral at bedtime  carvedilol 25 milliGRAM(s) Oral every 12 hours  chlorhexidine 0.12% Liquid 15 milliLiter(s) Swish and Spit two times a day  chlorhexidine 4% Liquid 1 Application(s) Topical two times a day  epoetin yolanda-epbx (RETACRIT) Injectable 14854 Unit(s) SubCutaneous every 7 days  folic acid 1 milliGRAM(s) Oral daily  hydrALAZINE 75 milliGRAM(s) Oral three times a day  influenza   Vaccine 0.5 milliLiter(s) IntraMuscular once  levETIRAcetam 750 milliGRAM(s) Oral two times a day  melatonin 5 milliGRAM(s) Oral at bedtime  pantoprazole    Tablet 40 milliGRAM(s) Oral before breakfast  phytonadione   Solution 5 milliGRAM(s) Oral daily  senna 2 Tablet(s) Oral at bedtime  sodium bicarbonate 650 milliGRAM(s) Oral three times a day    MEDICATIONS  (PRN):  albuterol/ipratropium for Nebulization 3 milliLiter(s) Nebulizer every 6 hours PRN Bronchospasm  nitroglycerin     SubLingual 0.4 milliGRAM(s) SubLingual every 5 minutes PRN Chest Pain        Labs:                        8.1    8.27  )-----------( 181      ( 12 Nov 2020 07:42 )             26.2     11-12    139  |  102  |  42.0<H>  ----------------------------<  105<H>  3.9   |  22.0  |  3.09<H>    Ca    8.1<L>      12 Nov 2020 07:42  Phos  4.3     11-12  Mg     2.3     11-12    TPro  6.0<L>  /  Alb  2.9<L>  /  TBili  0.4  /  DBili  x   /  AST  13  /  ALT  11  /  AlkPhos  60  11-12    PT/INR - ( 12 Nov 2020 07:42 )   PT: 13.5 sec;   INR: 1.17 ratio         PTT - ( 12 Nov 2020 07:42 )  PTT:34.9 sec    Blood Type: Type + Screen (11.12.20 @ 07:43)    ABO RH Interpretation: O POS      HGB A1C: A1C with Estimated Average Glucose in AM (11.03.20 @ 08:18)    A1C with Estimated Average Glucose Result: 5.1 %    Estimated Average Glucose: 100 mg/dL      Prealbumin: Prealbumin, Serum (11.06.20 @ 07:18)    Prealbumin, Serum: 18 mg/dL      Pro-BNP: Serum Pro-Brain Natriuretic Peptide (11.05.20 @ 01:31)    Serum Pro-Brain Natriuretic Peptide: 10226: NT-proBNP Interpretive comments:    Thyroid Panel: Thyroid Stimulating Hormone, Serum in AM (11.03.20 @ 08:18)    Thyroid Stimulating Hormone, Serum: 1.95 uIU/mL      MRSA:  / MSSA: MRSA/MSSA PCR (11.06.20 @ 08:27)    MRSA PCR Result.: NotDetec: NOT DETECTED RESULT: MRSA and MSSA not detected      Staph Aureus PCR Result: NotDetec          CXR: < from: Xray Chest 1 View- PORTABLE-Routine (Xray Chest 1 View- PORTABLE-Routine in AM.) (11.11.20 @ 05:26) >  Left pleural effusion.    Additional increased densityat the left lung base, likely atelectasis.    < end of copied text >      EKG:     Carotid Duplex:  < from: US Duplex Carotid Arteries Complete, Bilateral (11.05.20 @ 16:51) >  Moderate plaque in the right carotid bulb with elevated peak systolic and end-diastolic velocities which overall are suggestive of a 50-69% stenosis as described above, however given the peak systolic velocity is suggestive of a greater than 70% stenosis, aCTA of the neck is recommended for further evaluation.    Mild to moderate plaque in the left carotid bulb with less than 50% left internal carotid artery stenosis.        PFT's:    Echocardiogram: < from: TTE Echo Complete w/ Contrast w/ Doppler (11.04.20 @ 08:37) >   1. Left ventricular ejection fraction, by visual estimation, is 50 to 55%.   2. Mildly decreased global left ventricular systolic function.   3. Severely enlarged left atrium.   4. Moderately increased LV wall thickness.   5. Spectral Doppler shows pseudonormal pattern of left ventricular myocardial filling (Grade II diastolic dysfunction).   6. Mildly reduced RV systolic function.   7. Mildly enlarged right atrium.   8. Mild mitral annular calcification.   9. Moderate to severe mitral valve regurgitation.  10. Thickening of the anterior and posterior mitral valve leaflets.  11. Moderate tricuspid regurgitation.  12. Moderate to severe aortic regurgitation.  13. Mild pulmonic valve regurgitation.  14. Dilatation of the aortic root.  15. Aortic Root measuring 4.2 cm.  16. Estimated pulmonary artery systolic pressure is 43.8 mmHg assuming a right atrial pressure of 8 mmHg, which is consistent with mild pulmonary hypertension.    < end of copied text >      Cardiac catheterization: < from: Cardiac Cath Lab - Adult (11.10.20 @ 07:43) >  VENTRICLES: EF estimated was 50 %.  VALVES: AORTIC VALVE: There was moderate to severe aortic insufficiency.  MITRAL VALVE: The mitral valve exhibited moderate to severe regurgitation.  CORONARY VESSELS: The coronary circulation is right dominant.  LM:   --  LM: The left anterior descending and circumflex arteries arose  anomalously from separate ostia.  LAD:   --  LAD: Angiography showed moderate atherosclerosis.  CX:   --  Proximal circumflex: There was a discrete 60 % stenosis.  --  OM1: The vessel was small to medium sized. There was a discrete 80 %  stenosis. The lesion was irregularly contoured and eccentric.  --  OM2: The vessel was medium to large sized. There was a discrete 80 %  stenosis. The lesion was irregularly contoured, eccentric, and heavily  calcified.  RCA:   --  Proximal RCA: There was a 100 % stenosis. This lesion is a  chronic total occlusion. The distal vessel fills via collaterals from the  LCA.  AORTA: The root exhibited dilatation. Ascending aorta: The segment was  normal in size. Graft repair intact. Aortic arch: Normal. Known residual  Type B dissection by CTA. Descending aorta: The segment was normal in  size. Known residual Type B dissection by CTA.  ARCH VESSELS: Innominate: Known residual Type B dissection by CTA. Proximal  left subclavian: Known residual Type B dissection by CTA. LIMA: Normal.  TAMMY: Normal.    < end of copied text >      Vein Mapping:    Gen: WN/WD NAD  Neuro: AAOx3, nonfocal  Pulm: CTA B/L  CV: RRR, S1S2  Abd: Soft, NT, ND +BS  Ext: No edema, + peripheral pulses      Pt has AICD/PPM [ ] Yes  [x ] No             Brand Name:  Pre-op Beta Blocker ordered within 24 hrs of surgery?  [x ] Yes  [ ] No  If not, Why?  Type & Cross  [x ] Yes  [ ] No  NPO after Midnight [x ] Yes  [ ] No  Pre-op ABX ordered, to be taped on chart:  [x ] Yes  [ ] No     Hibiclens/Peridex ordered [x ] Yes  [ ] No  Intraop on Hold: PRBCs, CXR, MUSTAPHA [x ]   Consent obtained  x ] Yes  [ ] No

## 2020-11-12 NOTE — PROGRESS NOTE ADULT - ASSESSMENT
1) Martin -Started on HD before cardiac surgery for optimization  2) MBD of renal disease  3) Anemia of renal disease  4) Volume/ HTN    Plan for HD today after CT scan    Will dialyze in AM  Continue retacrit; anemia not at goal  dw Hawa Nava CTS PA 1) Martin -Started on HD before cardiac surgery for optimization  2) chronic diastolic HFpEF, moderate valvular heart disease including moderate aortic and mitral regurgitation  3) Anemia of renal disease  4) CAD; elevated LVEDP    Plan for HD today after CT scan  Continue retacrit; anemia below goal  UF with HD  Planned for CABG and valve replacement tomorrow

## 2020-11-12 NOTE — CHART NOTE - NSCHARTNOTEFT_GEN_A_CORE
Source: Patient [ ]  Family [ ]   other [x ] pt currently sleeping    Current Diet: Diet, DASH/TLC:   Sodium & Cholesterol Restricted (11-09-20 @ 17:23)    PO intake:  Pt with mostly good po intake at meals per RN flowsheets.  Breakfast not yet consumed as pt sleeping.    Current Weight:   (11/11) 210 lbs  (11/7) 202 lbs    % Weight Change - no wt loss noted, will continue to monitor.  Aware pt with 1+ trace generalized edema noted.    Pertinent Medications: MEDICATIONS  (STANDING):  ALPRAZolam 0.5 milliGRAM(s) Oral at bedtime  aspirin enteric coated 81 milliGRAM(s) Oral daily  atorvastatin 40 milliGRAM(s) Oral at bedtime  carvedilol 25 milliGRAM(s) Oral every 12 hours  chlorhexidine 0.12% Liquid 15 milliLiter(s) Swish and Spit two times a day  chlorhexidine 4% Liquid 1 Application(s) Topical two times a day  epoetin yolanda-epbx (RETACRIT) Injectable 85549 Unit(s) SubCutaneous every 7 days  folic acid 1 milliGRAM(s) Oral daily  hydrALAZINE 75 milliGRAM(s) Oral three times a day  influenza   Vaccine 0.5 milliLiter(s) IntraMuscular once  iron sucrose IVPB 200 milliGRAM(s) IV Intermittent every 48 hours  levETIRAcetam 750 milliGRAM(s) Oral two times a day  melatonin 5 milliGRAM(s) Oral at bedtime  pantoprazole    Tablet 40 milliGRAM(s) Oral before breakfast  phytonadione   Solution 5 milliGRAM(s) Oral daily  senna 2 Tablet(s) Oral at bedtime  sodium bicarbonate 650 milliGRAM(s) Oral three times a day    MEDICATIONS  (PRN):  albuterol/ipratropium for Nebulization 3 milliLiter(s) Nebulizer every 6 hours PRN Bronchospasm  nitroglycerin     SubLingual 0.4 milliGRAM(s) SubLingual every 5 minutes PRN Chest Pain    Pertinent Labs: CBC Full  -  ( 12 Nov 2020 07:42 )  WBC Count : 8.27 K/uL  RBC Count : 2.77 M/uL  Hemoglobin : 8.1 g/dL  Hematocrit : 26.2 %  Platelet Count - Automated : 181 K/uL  Mean Cell Volume : 94.6 fl  Mean Cell Hemoglobin : 29.2 pg  Mean Cell Hemoglobin Concentration : 30.9 gm/dL  11-12 Na139 mmol/L Glu 105 mg/dL<H> K+ 3.9 mmol/L Cr  3.09 mg/dL<H> BUN 42.0 mg/dL<H> Phos 4.3 mg/dL Alb 2.9 g/dL<L> PAB n/a       Skin: sx right groin    Nutrition focused physical exam not conducted - found signs of malnutrition [ ]absent [ ]present    Subcutaneous fat loss: [ ] Orbital fat pads region, [ ]Buccal fat region, [ ]Triceps region,  [ ]Ribs region    Muscle wasting: [ ]Temples region, [ ]Clavicle region, [ ]Shoulder region, [ ]Scapula region, [ ]Interosseous region,  [ ]thigh region, [ ]Calf region    Current Nutrition Diagnosis: Pt remains at nutrition risk secondary to increased nutrient needs related to increased physiological demand for a nutrient as evidenced by pt with SANTOS requiring HD at this time and now with mitral and aortic regurgitation- possible need for sx.  Pt with good po intake per RN flowsheets- will continue to monitor.    Recommendations:   1. Continue with diet rx  2. RX: Nephro-jeanie daily  3. Monitor daily wts     Monitoring and Evaluation:   [x ] PO intake [x ] Tolerance to diet prescription [X] Weights  [X] Follow up per protocol [X] Labs: Source: Patient [ ]  Family [ ]   other [x ] pt currently sleeping    Current Diet: Diet, DASH/TLC:   Sodium & Cholesterol Restricted (11-09-20 @ 17:23)    PO intake:  Pt with mostly good po intake at meals per RN flowsheets.  Breakfast not yet consumed as pt sleeping.    Current Weight:   (11/11) 210 lbs  (11/7) 202 lbs    % Weight Change - no wt loss noted, will continue to monitor.  Aware pt with 1+ trace generalized edema noted.    Pertinent Medications: MEDICATIONS  (STANDING):  ALPRAZolam 0.5 milliGRAM(s) Oral at bedtime  aspirin enteric coated 81 milliGRAM(s) Oral daily  atorvastatin 40 milliGRAM(s) Oral at bedtime  carvedilol 25 milliGRAM(s) Oral every 12 hours  chlorhexidine 0.12% Liquid 15 milliLiter(s) Swish and Spit two times a day  chlorhexidine 4% Liquid 1 Application(s) Topical two times a day  epoetin yolanda-epbx (RETACRIT) Injectable 83056 Unit(s) SubCutaneous every 7 days  folic acid 1 milliGRAM(s) Oral daily  hydrALAZINE 75 milliGRAM(s) Oral three times a day  influenza   Vaccine 0.5 milliLiter(s) IntraMuscular once  iron sucrose IVPB 200 milliGRAM(s) IV Intermittent every 48 hours  levETIRAcetam 750 milliGRAM(s) Oral two times a day  melatonin 5 milliGRAM(s) Oral at bedtime  pantoprazole    Tablet 40 milliGRAM(s) Oral before breakfast  phytonadione   Solution 5 milliGRAM(s) Oral daily  senna 2 Tablet(s) Oral at bedtime  sodium bicarbonate 650 milliGRAM(s) Oral three times a day    MEDICATIONS  (PRN):  albuterol/ipratropium for Nebulization 3 milliLiter(s) Nebulizer every 6 hours PRN Bronchospasm  nitroglycerin     SubLingual 0.4 milliGRAM(s) SubLingual every 5 minutes PRN Chest Pain    Pertinent Labs: CBC Full  -  ( 12 Nov 2020 07:42 )  WBC Count : 8.27 K/uL  RBC Count : 2.77 M/uL  Hemoglobin : 8.1 g/dL  Hematocrit : 26.2 %  Platelet Count - Automated : 181 K/uL  Mean Cell Volume : 94.6 fl  Mean Cell Hemoglobin : 29.2 pg  Mean Cell Hemoglobin Concentration : 30.9 gm/dL  11-12 Na139 mmol/L Glu 105 mg/dL<H> K+ 3.9 mmol/L Cr  3.09 mg/dL<H> BUN 42.0 mg/dL<H> Phos 4.3 mg/dL Alb 2.9 g/dL<L> PAB n/a       Skin: sx right groin    Nutrition focused physical exam not conducted - found signs of malnutrition [ ]absent [ ]present    Subcutaneous fat loss: [ ] Orbital fat pads region, [ ]Buccal fat region, [ ]Triceps region,  [ ]Ribs region    Muscle wasting: [ ]Temples region, [ ]Clavicle region, [ ]Shoulder region, [ ]Scapula region, [ ]Interosseous region,  [ ]thigh region, [ ]Calf region    Current Nutrition Diagnosis: Pt remains at nutrition risk secondary to increased nutrient needs related to increased physiological demand for a nutrient as evidenced by pt with SANTOS requiring HD at this time and now with mitral and aortic regurgitation- possible need for sx.  Pt with good po intake per RN flowsheets- will continue to monitor.    Recommendations:   1. RX: Nepro BID  2. RX: Nephro-jeanie daily  3. Monitor daily wts     Monitoring and Evaluation:   [x ] PO intake [x ] Tolerance to diet prescription [X] Weights  [X] Follow up per protocol [X] Labs:

## 2020-11-13 ENCOUNTER — APPOINTMENT (OUTPATIENT)
Dept: CARDIOTHORACIC SURGERY | Facility: HOSPITAL | Age: 60
End: 2020-11-13

## 2020-11-13 ENCOUNTER — RESULT REVIEW (OUTPATIENT)
Age: 60
End: 2020-11-13

## 2020-11-13 LAB
ALBUMIN SERPL ELPH-MCNC: 2.9 G/DL — LOW (ref 3.3–5.2)
ALBUMIN SERPL ELPH-MCNC: 3 G/DL — LOW (ref 3.3–5.2)
ALP SERPL-CCNC: 34 U/L — LOW (ref 40–120)
ALP SERPL-CCNC: 38 U/L — LOW (ref 40–120)
ALT FLD-CCNC: 19 U/L — SIGNIFICANT CHANGE UP
ALT FLD-CCNC: 20 U/L — SIGNIFICANT CHANGE UP
ANION GAP SERPL CALC-SCNC: 11 MMOL/L — SIGNIFICANT CHANGE UP (ref 5–17)
ANION GAP SERPL CALC-SCNC: 14 MMOL/L — SIGNIFICANT CHANGE UP (ref 5–17)
ANION GAP SERPL CALC-SCNC: 14 MMOL/L — SIGNIFICANT CHANGE UP (ref 5–17)
APTT BLD: 35.1 SEC — SIGNIFICANT CHANGE UP (ref 27.5–35.5)
AST SERPL-CCNC: 75 U/L — HIGH
AST SERPL-CCNC: 85 U/L — HIGH
BASE EXCESS BLDV CALC-SCNC: -0.6 MMOL/L — SIGNIFICANT CHANGE UP (ref -2–2)
BASE EXCESS BLDV CALC-SCNC: 2.2 MMOL/L — HIGH (ref -2–2)
BILIRUB SERPL-MCNC: 2.4 MG/DL — HIGH (ref 0.4–2)
BILIRUB SERPL-MCNC: 2.9 MG/DL — HIGH (ref 0.4–2)
BUN SERPL-MCNC: 27 MG/DL — HIGH (ref 8–20)
BUN SERPL-MCNC: 27 MG/DL — HIGH (ref 8–20)
BUN SERPL-MCNC: 32 MG/DL — HIGH (ref 8–20)
CALCIUM SERPL-MCNC: 8.1 MG/DL — LOW (ref 8.6–10.2)
CALCIUM SERPL-MCNC: 8.4 MG/DL — LOW (ref 8.6–10.2)
CALCIUM SERPL-MCNC: 9.1 MG/DL — SIGNIFICANT CHANGE UP (ref 8.6–10.2)
CHLORIDE SERPL-SCNC: 103 MMOL/L — SIGNIFICANT CHANGE UP (ref 98–107)
CHLORIDE SERPL-SCNC: 104 MMOL/L — SIGNIFICANT CHANGE UP (ref 98–107)
CHLORIDE SERPL-SCNC: 109 MMOL/L — HIGH (ref 98–107)
CO2 SERPL-SCNC: 24 MMOL/L — SIGNIFICANT CHANGE UP (ref 22–29)
CO2 SERPL-SCNC: 25 MMOL/L — SIGNIFICANT CHANGE UP (ref 22–29)
CO2 SERPL-SCNC: 26 MMOL/L — SIGNIFICANT CHANGE UP (ref 22–29)
CREAT SERPL-MCNC: 2.04 MG/DL — HIGH (ref 0.5–1.3)
CREAT SERPL-MCNC: 2.07 MG/DL — HIGH (ref 0.5–1.3)
CREAT SERPL-MCNC: 2.8 MG/DL — HIGH (ref 0.5–1.3)
GAS PNL BLDA: SIGNIFICANT CHANGE UP
GAS PNL BLDV: SIGNIFICANT CHANGE UP
GAS PNL BLDV: SIGNIFICANT CHANGE UP
GLUCOSE BLDC GLUCOMTR-MCNC: 153 MG/DL — HIGH (ref 70–99)
GLUCOSE BLDC GLUCOMTR-MCNC: 176 MG/DL — HIGH (ref 70–99)
GLUCOSE BLDC GLUCOMTR-MCNC: 190 MG/DL — HIGH (ref 70–99)
GLUCOSE BLDC GLUCOMTR-MCNC: 65 MG/DL — LOW (ref 70–99)
GLUCOSE BLDC GLUCOMTR-MCNC: 80 MG/DL — SIGNIFICANT CHANGE UP (ref 70–99)
GLUCOSE SERPL-MCNC: 111 MG/DL — HIGH (ref 70–99)
GLUCOSE SERPL-MCNC: 155 MG/DL — HIGH (ref 70–99)
GLUCOSE SERPL-MCNC: 95 MG/DL — SIGNIFICANT CHANGE UP (ref 70–99)
HCO3 BLDV-SCNC: 24 MMOL/L — SIGNIFICANT CHANGE UP (ref 21–29)
HCO3 BLDV-SCNC: 26 MMOL/L — SIGNIFICANT CHANGE UP (ref 21–29)
HCT VFR BLD CALC: 26.8 % — LOW (ref 39–50)
HCT VFR BLD CALC: 29.8 % — LOW (ref 39–50)
HCT VFR BLD CALC: 34.5 % — LOW (ref 39–50)
HGB BLD-MCNC: 11.7 G/DL — LOW (ref 13–17)
HGB BLD-MCNC: 9 G/DL — LOW (ref 13–17)
HGB BLD-MCNC: 9.3 G/DL — LOW (ref 13–17)
HOROWITZ INDEX BLDV+IHG-RTO: 70 — SIGNIFICANT CHANGE UP
HOROWITZ INDEX BLDV+IHG-RTO: 80 — SIGNIFICANT CHANGE UP
INR BLD: 0.9 RATIO — SIGNIFICANT CHANGE UP (ref 0.88–1.16)
MAGNESIUM SERPL-MCNC: 2.1 MG/DL — SIGNIFICANT CHANGE UP (ref 1.6–2.6)
MAGNESIUM SERPL-MCNC: 2.7 MG/DL — HIGH (ref 1.6–2.6)
MAGNESIUM SERPL-MCNC: 2.7 MG/DL — HIGH (ref 1.6–2.6)
MCHC RBC-ENTMCNC: 29.1 PG — SIGNIFICANT CHANGE UP (ref 27–34)
MCHC RBC-ENTMCNC: 30.4 PG — SIGNIFICANT CHANGE UP (ref 27–34)
MCHC RBC-ENTMCNC: 30.7 PG — SIGNIFICANT CHANGE UP (ref 27–34)
MCHC RBC-ENTMCNC: 31.2 GM/DL — LOW (ref 32–36)
MCHC RBC-ENTMCNC: 33.6 GM/DL — SIGNIFICANT CHANGE UP (ref 32–36)
MCHC RBC-ENTMCNC: 33.9 GM/DL — SIGNIFICANT CHANGE UP (ref 32–36)
MCV RBC AUTO: 90.5 FL — SIGNIFICANT CHANGE UP (ref 80–100)
MCV RBC AUTO: 90.6 FL — SIGNIFICANT CHANGE UP (ref 80–100)
MCV RBC AUTO: 93.1 FL — SIGNIFICANT CHANGE UP (ref 80–100)
PCO2 BLDV: 49 MMHG — SIGNIFICANT CHANGE UP (ref 35–50)
PCO2 BLDV: 53 MMHG — HIGH (ref 35–50)
PH BLDV: 7.32 — SIGNIFICANT CHANGE UP (ref 7.32–7.43)
PH BLDV: 7.34 — SIGNIFICANT CHANGE UP (ref 7.32–7.43)
PHOSPHATE SERPL-MCNC: 1.8 MG/DL — LOW (ref 2.4–4.7)
PHOSPHATE SERPL-MCNC: 3.7 MG/DL — SIGNIFICANT CHANGE UP (ref 2.4–4.7)
PLATELET # BLD AUTO: 168 K/UL — SIGNIFICANT CHANGE UP (ref 150–400)
PLATELET # BLD AUTO: 177 K/UL — SIGNIFICANT CHANGE UP (ref 150–400)
PLATELET # BLD AUTO: 201 K/UL — SIGNIFICANT CHANGE UP (ref 150–400)
PO2 BLDV: 39 MMHG — SIGNIFICANT CHANGE UP (ref 25–45)
PO2 BLDV: 43 MMHG — SIGNIFICANT CHANGE UP (ref 25–45)
POTASSIUM SERPL-MCNC: 3.9 MMOL/L — SIGNIFICANT CHANGE UP (ref 3.5–5.3)
POTASSIUM SERPL-MCNC: 4 MMOL/L — SIGNIFICANT CHANGE UP (ref 3.5–5.3)
POTASSIUM SERPL-MCNC: 4 MMOL/L — SIGNIFICANT CHANGE UP (ref 3.5–5.3)
POTASSIUM SERPL-SCNC: 3.9 MMOL/L — SIGNIFICANT CHANGE UP (ref 3.5–5.3)
POTASSIUM SERPL-SCNC: 4 MMOL/L — SIGNIFICANT CHANGE UP (ref 3.5–5.3)
POTASSIUM SERPL-SCNC: 4 MMOL/L — SIGNIFICANT CHANGE UP (ref 3.5–5.3)
PROT SERPL-MCNC: 4.9 G/DL — LOW (ref 6.6–8.7)
PROT SERPL-MCNC: 4.9 G/DL — LOW (ref 6.6–8.7)
PROTHROM AB SERPL-ACNC: 10.5 SEC — LOW (ref 10.6–13.6)
RBC # BLD: 2.96 M/UL — LOW (ref 4.2–5.8)
RBC # BLD: 3.2 M/UL — LOW (ref 4.2–5.8)
RBC # BLD: 3.81 M/UL — LOW (ref 4.2–5.8)
RBC # FLD: 14.5 % — SIGNIFICANT CHANGE UP (ref 10.3–14.5)
RBC # FLD: 15.1 % — HIGH (ref 10.3–14.5)
RBC # FLD: 16.3 % — HIGH (ref 10.3–14.5)
SAO2 % BLDV: 75 % — SIGNIFICANT CHANGE UP
SAO2 % BLDV: 78 % — SIGNIFICANT CHANGE UP
SODIUM SERPL-SCNC: 140 MMOL/L — SIGNIFICANT CHANGE UP (ref 135–145)
SODIUM SERPL-SCNC: 143 MMOL/L — SIGNIFICANT CHANGE UP (ref 135–145)
SODIUM SERPL-SCNC: 146 MMOL/L — HIGH (ref 135–145)
WBC # BLD: 14.69 K/UL — HIGH (ref 3.8–10.5)
WBC # BLD: 17.43 K/UL — HIGH (ref 3.8–10.5)
WBC # BLD: 8.45 K/UL — SIGNIFICANT CHANGE UP (ref 3.8–10.5)
WBC # FLD AUTO: 14.69 K/UL — HIGH (ref 3.8–10.5)
WBC # FLD AUTO: 17.43 K/UL — HIGH (ref 3.8–10.5)
WBC # FLD AUTO: 8.45 K/UL — SIGNIFICANT CHANGE UP (ref 3.8–10.5)

## 2020-11-13 PROCEDURE — 71045 X-RAY EXAM CHEST 1 VIEW: CPT | Mod: 26,77

## 2020-11-13 PROCEDURE — 88300 SURGICAL PATH GROSS: CPT | Mod: 26

## 2020-11-13 PROCEDURE — 33530 CORONARY ARTERY BYPASS/REOP: CPT | Mod: 80

## 2020-11-13 PROCEDURE — 33430 REPLACEMENT OF MITRAL VALVE: CPT | Mod: 80

## 2020-11-13 PROCEDURE — 33405 REPLACEMENT AORTIC VALVE OPN: CPT | Mod: 80

## 2020-11-13 PROCEDURE — 33430 REPLACEMENT OF MITRAL VALVE: CPT

## 2020-11-13 PROCEDURE — 99233 SBSQ HOSP IP/OBS HIGH 50: CPT

## 2020-11-13 PROCEDURE — 33511 CABG VEIN TWO: CPT

## 2020-11-13 PROCEDURE — 33530 CORONARY ARTERY BYPASS/REOP: CPT

## 2020-11-13 PROCEDURE — 33405 REPLACEMENT AORTIC VALVE OPN: CPT

## 2020-11-13 PROCEDURE — 88305 TISSUE EXAM BY PATHOLOGIST: CPT | Mod: 26

## 2020-11-13 PROCEDURE — 71045 X-RAY EXAM CHEST 1 VIEW: CPT | Mod: 26

## 2020-11-13 PROCEDURE — 33508 ENDOSCOPIC VEIN HARVEST: CPT

## 2020-11-13 RX ORDER — PANTOPRAZOLE SODIUM 20 MG/1
40 TABLET, DELAYED RELEASE ORAL ONCE
Refills: 0 | Status: COMPLETED | OUTPATIENT
Start: 2020-11-13 | End: 2020-11-13

## 2020-11-13 RX ORDER — SODIUM CHLORIDE 9 MG/ML
1000 INJECTION INTRAMUSCULAR; INTRAVENOUS; SUBCUTANEOUS
Refills: 0 | Status: DISCONTINUED | OUTPATIENT
Start: 2020-11-13 | End: 2020-11-27

## 2020-11-13 RX ORDER — EPINEPHRINE 0.3 MG/.3ML
0.03 INJECTION INTRAMUSCULAR; SUBCUTANEOUS
Qty: 4 | Refills: 0 | Status: DISCONTINUED | OUTPATIENT
Start: 2020-11-13 | End: 2020-11-14

## 2020-11-13 RX ORDER — FOLIC ACID 0.8 MG
1 TABLET ORAL DAILY
Refills: 0 | Status: DISCONTINUED | OUTPATIENT
Start: 2020-11-13 | End: 2020-11-30

## 2020-11-13 RX ORDER — MILRINONE LACTATE 1 MG/ML
0.4 INJECTION, SOLUTION INTRAVENOUS
Qty: 20 | Refills: 0 | Status: DISCONTINUED | OUTPATIENT
Start: 2020-11-13 | End: 2020-11-14

## 2020-11-13 RX ORDER — CALCIUM GLUCONATE 100 MG/ML
2 VIAL (ML) INTRAVENOUS ONCE
Refills: 0 | Status: COMPLETED | OUTPATIENT
Start: 2020-11-13 | End: 2020-11-13

## 2020-11-13 RX ORDER — VASOPRESSIN 20 [USP'U]/ML
0.13 INJECTION INTRAVENOUS
Qty: 50 | Refills: 0 | Status: DISCONTINUED | OUTPATIENT
Start: 2020-11-13 | End: 2020-11-14

## 2020-11-13 RX ORDER — CEFUROXIME AXETIL 250 MG
1500 TABLET ORAL EVERY 8 HOURS
Refills: 0 | Status: COMPLETED | OUTPATIENT
Start: 2020-11-13 | End: 2020-11-15

## 2020-11-13 RX ORDER — LEVETIRACETAM 250 MG/1
750 TABLET, FILM COATED ORAL
Refills: 0 | Status: DISCONTINUED | OUTPATIENT
Start: 2020-11-13 | End: 2020-11-30

## 2020-11-13 RX ORDER — ATORVASTATIN CALCIUM 80 MG/1
40 TABLET, FILM COATED ORAL AT BEDTIME
Refills: 0 | Status: DISCONTINUED | OUTPATIENT
Start: 2020-11-13 | End: 2020-11-30

## 2020-11-13 RX ORDER — PANTOPRAZOLE SODIUM 20 MG/1
40 TABLET, DELAYED RELEASE ORAL DAILY
Refills: 0 | Status: DISCONTINUED | OUTPATIENT
Start: 2020-11-13 | End: 2020-11-14

## 2020-11-13 RX ORDER — POTASSIUM CHLORIDE 20 MEQ
10 PACKET (EA) ORAL
Refills: 0 | Status: DISCONTINUED | OUTPATIENT
Start: 2020-11-13 | End: 2020-11-14

## 2020-11-13 RX ORDER — DEXAMETHASONE 0.5 MG/5ML
6 ELIXIR ORAL EVERY 6 HOURS
Refills: 0 | Status: COMPLETED | OUTPATIENT
Start: 2020-11-13 | End: 2020-11-14

## 2020-11-13 RX ORDER — PROPOFOL 10 MG/ML
41.75 INJECTION, EMULSION INTRAVENOUS
Qty: 1000 | Refills: 0 | Status: DISCONTINUED | OUTPATIENT
Start: 2020-11-13 | End: 2020-11-16

## 2020-11-13 RX ORDER — ALBUMIN HUMAN 25 %
250 VIAL (ML) INTRAVENOUS
Refills: 0 | Status: COMPLETED | OUTPATIENT
Start: 2020-11-13 | End: 2020-11-13

## 2020-11-13 RX ORDER — DEXTROSE 50 % IN WATER 50 %
25 SYRINGE (ML) INTRAVENOUS
Refills: 0 | Status: DISCONTINUED | OUTPATIENT
Start: 2020-11-13 | End: 2020-11-18

## 2020-11-13 RX ORDER — CHLORHEXIDINE GLUCONATE 213 G/1000ML
5 SOLUTION TOPICAL
Refills: 0 | Status: DISCONTINUED | OUTPATIENT
Start: 2020-11-13 | End: 2020-11-13

## 2020-11-13 RX ORDER — VANCOMYCIN HCL 1 G
1000 VIAL (EA) INTRAVENOUS EVERY 12 HOURS
Refills: 0 | Status: DISCONTINUED | OUTPATIENT
Start: 2020-11-13 | End: 2020-11-14

## 2020-11-13 RX ORDER — NOREPINEPHRINE BITARTRATE/D5W 8 MG/250ML
0.05 PLASTIC BAG, INJECTION (ML) INTRAVENOUS
Qty: 8 | Refills: 0 | Status: DISCONTINUED | OUTPATIENT
Start: 2020-11-13 | End: 2020-11-14

## 2020-11-13 RX ORDER — CHLORHEXIDINE GLUCONATE 213 G/1000ML
1 SOLUTION TOPICAL DAILY
Refills: 0 | Status: DISCONTINUED | OUTPATIENT
Start: 2020-11-13 | End: 2020-11-30

## 2020-11-13 RX ORDER — DEXTROSE 50 % IN WATER 50 %
50 SYRINGE (ML) INTRAVENOUS
Refills: 0 | Status: COMPLETED | OUTPATIENT
Start: 2020-11-13 | End: 2020-11-13

## 2020-11-13 RX ORDER — ASPIRIN/CALCIUM CARB/MAGNESIUM 324 MG
325 TABLET ORAL ONCE
Refills: 0 | Status: COMPLETED | OUTPATIENT
Start: 2020-11-13 | End: 2020-11-13

## 2020-11-13 RX ORDER — ASPIRIN/CALCIUM CARB/MAGNESIUM 324 MG
325 TABLET ORAL DAILY
Refills: 0 | Status: DISCONTINUED | OUTPATIENT
Start: 2020-11-14 | End: 2020-11-14

## 2020-11-13 RX ORDER — DEXTROSE 50 % IN WATER 50 %
50 SYRINGE (ML) INTRAVENOUS
Refills: 0 | Status: DISCONTINUED | OUTPATIENT
Start: 2020-11-13 | End: 2020-11-18

## 2020-11-13 RX ORDER — CHLORHEXIDINE GLUCONATE 213 G/1000ML
15 SOLUTION TOPICAL EVERY 12 HOURS
Refills: 0 | Status: DISCONTINUED | OUTPATIENT
Start: 2020-11-13 | End: 2020-11-16

## 2020-11-13 RX ORDER — INSULIN HUMAN 100 [IU]/ML
8 INJECTION, SOLUTION SUBCUTANEOUS
Qty: 50 | Refills: 0 | Status: DISCONTINUED | OUTPATIENT
Start: 2020-11-13 | End: 2020-11-17

## 2020-11-13 RX ORDER — MEPERIDINE HYDROCHLORIDE 50 MG/ML
25 INJECTION INTRAMUSCULAR; INTRAVENOUS; SUBCUTANEOUS ONCE
Refills: 0 | Status: DISCONTINUED | OUTPATIENT
Start: 2020-11-13 | End: 2020-11-14

## 2020-11-13 RX ADMIN — Medication 9.36 MICROGRAM(S)/KG/MIN: at 23:56

## 2020-11-13 RX ADMIN — Medication 200 GRAM(S): at 20:47

## 2020-11-13 RX ADMIN — Medication 125 MILLILITER(S): at 18:00

## 2020-11-13 RX ADMIN — CHLORHEXIDINE GLUCONATE 15 MILLILITER(S): 213 SOLUTION TOPICAL at 06:02

## 2020-11-13 RX ADMIN — Medication 75 MILLIGRAM(S): at 06:03

## 2020-11-13 RX ADMIN — Medication 100 MILLIGRAM(S): at 21:37

## 2020-11-13 RX ADMIN — Medication 50 MILLILITER(S): at 21:19

## 2020-11-13 RX ADMIN — PANTOPRAZOLE SODIUM 40 MILLIGRAM(S): 20 TABLET, DELAYED RELEASE ORAL at 06:02

## 2020-11-13 RX ADMIN — PANTOPRAZOLE SODIUM 40 MILLIGRAM(S): 20 TABLET, DELAYED RELEASE ORAL at 20:41

## 2020-11-13 RX ADMIN — Medication 250 MILLIGRAM(S): at 22:22

## 2020-11-13 RX ADMIN — Medication 125 MILLILITER(S): at 17:00

## 2020-11-13 RX ADMIN — LEVETIRACETAM 750 MILLIGRAM(S): 250 TABLET, FILM COATED ORAL at 21:42

## 2020-11-13 RX ADMIN — CARVEDILOL PHOSPHATE 25 MILLIGRAM(S): 80 CAPSULE, EXTENDED RELEASE ORAL at 06:02

## 2020-11-13 RX ADMIN — LEVETIRACETAM 750 MILLIGRAM(S): 250 TABLET, FILM COATED ORAL at 06:02

## 2020-11-13 RX ADMIN — Medication 125 MILLILITER(S): at 17:45

## 2020-11-13 RX ADMIN — CHLORHEXIDINE GLUCONATE 1 APPLICATION(S): 213 SOLUTION TOPICAL at 06:03

## 2020-11-13 RX ADMIN — Medication 4 MILLIGRAM(S): at 20:10

## 2020-11-13 RX ADMIN — Medication 325 MILLIGRAM(S): at 21:42

## 2020-11-13 RX ADMIN — VASOPRESSIN 8 UNIT(S)/MIN: 20 INJECTION INTRAVENOUS at 22:14

## 2020-11-13 RX ADMIN — PROPOFOL 25 MICROGRAM(S)/KG/MIN: 10 INJECTION, EMULSION INTRAVENOUS at 19:30

## 2020-11-13 RX ADMIN — Medication 650 MILLIGRAM(S): at 06:02

## 2020-11-13 NOTE — BRIEF OPERATIVE NOTE - OPERATION/FINDINGS
EGD: Irregular Z line. 3 cm hiatal hernia. Normal stomach and duodenum  Colonoscopy: Normal rectum. Normal anastomotic site and neoterminal ileum. Normal blind pouch
Aortic insufficiency, mitral regurgitation, 2vCAD

## 2020-11-13 NOTE — BRIEF OPERATIVE NOTE - COMMENTS
greater saphenous vein harvested open and endoscopically from left lower extremity  Dr. Fisher first assisted Dr. Butler in all aspects of the operation, no qualified resident available to assist  Aortic cross clamp  Minutes  Unable to calculate EBL due to use of cell saver greater saphenous vein harvested open and endoscopically from left lower extremity  Dr. Fisher first assisted Dr. Butler in all aspects of the operation, no qualified resident available to assist  Aortic cross clamp  Minutes  Unable to calculate EBL due to use of cell saver  Dr. Fisher: I first assisted for the full procedure.

## 2020-11-13 NOTE — BRIEF OPERATIVE NOTE - NSICDXBRIEFPREOP_GEN_ALL_CORE_FT
PRE-OP DIAGNOSIS:  Anemia 09-Nov-2020 15:53:03  Austin Nieto  
PRE-OP DIAGNOSIS:  Coronary artery disease 14-Nov-2020 09:37:56  Duran Gillette  Mitral regurgitation 14-Nov-2020 09:37:43  Duran Gillette  Aortic insufficiency 14-Nov-2020 09:37:36  Duran Gillette

## 2020-11-13 NOTE — PROCEDURE NOTE - NSPROCDETAILS_GEN_ALL_CORE
location identified, draped/prepped, sterile technique used/sterile technique, catheter placed/ultrasound guidance/supine position/sterile dressing applied/ultrasound assessment
guidewire recovered/sterile technique, catheter placed/lumen(s) aspirated and flushed/sterile dressing applied/ultrasound guidance
sterile technique, catheter placed/guidewire recovered/lumen(s) aspirated and flushed/sterile dressing applied

## 2020-11-13 NOTE — PROCEDURE NOTE - NSINFORMCONSENT_GEN_A_CORE
Benefits, risks, and possible complications of procedure explained to patient/caregiver who verbalized understanding and gave written consent.
Benefits, risks, and possible complications of procedure explained to patient/caregiver who verbalized understanding and gave verbal consent.
This was an emergent procedure.

## 2020-11-13 NOTE — BRIEF OPERATIVE NOTE - NSICDXBRIEFPROCEDURE_GEN_ALL_CORE_FT
PROCEDURES:  Colonoscopy 09-Nov-2020 15:52:39  Austin Nieto  EGD with biopsy 09-Nov-2020 15:52:22  Austin Nieto  
PROCEDURES:  Reoperation with repair or replacement of aortic and mitral valves 14-Nov-2020 09:36:16 reop AVR 25mm CE 2700TFX, MVR 31mm Magna Ease, CABGx2 RSGV-OM, RPDA Duran Gillette

## 2020-11-13 NOTE — PRE-OP CHECKLIST - DENTURES
Reschedule Appointment     Who is calling in  [facility]    Doctor Appointment Scheduled with Dr Ronnie Napoles date and time 11/09 at 9:20am    New date and time 11/13 at 1:40pm    Location [place]   Patient verbalized understanding 
no

## 2020-11-13 NOTE — BRIEF OPERATIVE NOTE - NSICDXBRIEFPOSTOP_GEN_ALL_CORE_FT
POST-OP DIAGNOSIS:  Irregular Z line of esophagus 09-Nov-2020 15:53:21  Austin Nieto  Hiatal hernia 09-Nov-2020 15:53:12  Austin Nieto  
POST-OP DIAGNOSIS:  CAD in native artery 14-Nov-2020 09:38:38  Duran Gillette  Mitral regurgitation 14-Nov-2020 09:38:29  Duran Gillette  Aortic insufficiency 14-Nov-2020 09:38:22  Duran Gillette

## 2020-11-13 NOTE — PROCEDURE NOTE - NSINDICATIONS_GEN_A_CORE
dialysis/CRRT
venous access
hemodynamic monitoring/critical illness/venous access/volume resuscitation

## 2020-11-13 NOTE — BRIEF OPERATIVE NOTE - NSICDXBRIEFOPLAUNCH_GEN_ALL_CORE
<--- Click to Launch ICDx for PreOp, PostOp and Procedure
<--- Click to Launch ICDx for PreOp, PostOp and Procedure
0 = independent

## 2020-11-14 DIAGNOSIS — Z29.9 ENCOUNTER FOR PROPHYLACTIC MEASURES, UNSPECIFIED: ICD-10-CM

## 2020-11-14 LAB
ALBUMIN SERPL ELPH-MCNC: 3 G/DL — LOW (ref 3.3–5.2)
ALBUMIN SERPL ELPH-MCNC: 3.3 G/DL — SIGNIFICANT CHANGE UP (ref 3.3–5.2)
ALP SERPL-CCNC: 32 U/L — LOW (ref 40–120)
ALP SERPL-CCNC: 33 U/L — LOW (ref 40–120)
ALT FLD-CCNC: 19 U/L — SIGNIFICANT CHANGE UP
ALT FLD-CCNC: 19 U/L — SIGNIFICANT CHANGE UP
ANION GAP SERPL CALC-SCNC: 11 MMOL/L — SIGNIFICANT CHANGE UP (ref 5–17)
ANION GAP SERPL CALC-SCNC: 13 MMOL/L — SIGNIFICANT CHANGE UP (ref 5–17)
AST SERPL-CCNC: 81 U/L — HIGH
AST SERPL-CCNC: 92 U/L — HIGH
BASE EXCESS BLDV CALC-SCNC: 0.8 MMOL/L — SIGNIFICANT CHANGE UP (ref -2–2)
BASOPHILS # BLD AUTO: 0.03 K/UL — SIGNIFICANT CHANGE UP (ref 0–0.2)
BASOPHILS NFR BLD AUTO: 0.2 % — SIGNIFICANT CHANGE UP (ref 0–2)
BILIRUB SERPL-MCNC: 2 MG/DL — SIGNIFICANT CHANGE UP (ref 0.4–2)
BILIRUB SERPL-MCNC: 2.7 MG/DL — HIGH (ref 0.4–2)
BUN SERPL-MCNC: 32 MG/DL — HIGH (ref 8–20)
BUN SERPL-MCNC: 37 MG/DL — HIGH (ref 8–20)
CALCIUM SERPL-MCNC: 8.8 MG/DL — SIGNIFICANT CHANGE UP (ref 8.6–10.2)
CALCIUM SERPL-MCNC: 9 MG/DL — SIGNIFICANT CHANGE UP (ref 8.6–10.2)
CHLORIDE SERPL-SCNC: 107 MMOL/L — SIGNIFICANT CHANGE UP (ref 98–107)
CHLORIDE SERPL-SCNC: 108 MMOL/L — HIGH (ref 98–107)
CO2 SERPL-SCNC: 24 MMOL/L — SIGNIFICANT CHANGE UP (ref 22–29)
CO2 SERPL-SCNC: 25 MMOL/L — SIGNIFICANT CHANGE UP (ref 22–29)
CREAT SERPL-MCNC: 2.51 MG/DL — HIGH (ref 0.5–1.3)
CREAT SERPL-MCNC: 3.11 MG/DL — HIGH (ref 0.5–1.3)
EOSINOPHIL # BLD AUTO: 0 K/UL — SIGNIFICANT CHANGE UP (ref 0–0.5)
EOSINOPHIL NFR BLD AUTO: 0 % — SIGNIFICANT CHANGE UP (ref 0–6)
GAS PNL BLDA: SIGNIFICANT CHANGE UP
GAS PNL BLDV: SIGNIFICANT CHANGE UP
GLUCOSE BLDC GLUCOMTR-MCNC: 102 MG/DL — HIGH (ref 70–99)
GLUCOSE BLDC GLUCOMTR-MCNC: 103 MG/DL — HIGH (ref 70–99)
GLUCOSE BLDC GLUCOMTR-MCNC: 104 MG/DL — HIGH (ref 70–99)
GLUCOSE BLDC GLUCOMTR-MCNC: 105 MG/DL — HIGH (ref 70–99)
GLUCOSE BLDC GLUCOMTR-MCNC: 106 MG/DL — HIGH (ref 70–99)
GLUCOSE BLDC GLUCOMTR-MCNC: 110 MG/DL — HIGH (ref 70–99)
GLUCOSE BLDC GLUCOMTR-MCNC: 117 MG/DL — HIGH (ref 70–99)
GLUCOSE BLDC GLUCOMTR-MCNC: 118 MG/DL — HIGH (ref 70–99)
GLUCOSE BLDC GLUCOMTR-MCNC: 125 MG/DL — HIGH (ref 70–99)
GLUCOSE BLDC GLUCOMTR-MCNC: 127 MG/DL — HIGH (ref 70–99)
GLUCOSE BLDC GLUCOMTR-MCNC: 128 MG/DL — HIGH (ref 70–99)
GLUCOSE BLDC GLUCOMTR-MCNC: 128 MG/DL — HIGH (ref 70–99)
GLUCOSE BLDC GLUCOMTR-MCNC: 143 MG/DL — HIGH (ref 70–99)
GLUCOSE BLDC GLUCOMTR-MCNC: 161 MG/DL — HIGH (ref 70–99)
GLUCOSE SERPL-MCNC: 107 MG/DL — HIGH (ref 70–99)
GLUCOSE SERPL-MCNC: 154 MG/DL — HIGH (ref 70–99)
HCO3 BLDV-SCNC: 25 MMOL/L — SIGNIFICANT CHANGE UP (ref 21–29)
HCT VFR BLD CALC: 30.4 % — LOW (ref 39–50)
HCT VFR BLD CALC: 33.1 % — LOW (ref 39–50)
HGB BLD-MCNC: 10.2 G/DL — LOW (ref 13–17)
HGB BLD-MCNC: 11 G/DL — LOW (ref 13–17)
IMM GRANULOCYTES NFR BLD AUTO: 0.6 % — SIGNIFICANT CHANGE UP (ref 0–1.5)
LYMPHOCYTES # BLD AUTO: 0.52 K/UL — LOW (ref 1–3.3)
LYMPHOCYTES # BLD AUTO: 2.6 % — LOW (ref 13–44)
MAGNESIUM SERPL-MCNC: 2.6 MG/DL — SIGNIFICANT CHANGE UP (ref 1.8–2.6)
MAGNESIUM SERPL-MCNC: 2.8 MG/DL — HIGH (ref 1.6–2.6)
MCHC RBC-ENTMCNC: 29.6 PG — SIGNIFICANT CHANGE UP (ref 27–34)
MCHC RBC-ENTMCNC: 30.2 PG — SIGNIFICANT CHANGE UP (ref 27–34)
MCHC RBC-ENTMCNC: 33.2 GM/DL — SIGNIFICANT CHANGE UP (ref 32–36)
MCHC RBC-ENTMCNC: 33.6 GM/DL — SIGNIFICANT CHANGE UP (ref 32–36)
MCV RBC AUTO: 89.2 FL — SIGNIFICANT CHANGE UP (ref 80–100)
MCV RBC AUTO: 89.9 FL — SIGNIFICANT CHANGE UP (ref 80–100)
MONOCYTES # BLD AUTO: 0.99 K/UL — HIGH (ref 0–0.9)
MONOCYTES NFR BLD AUTO: 5 % — SIGNIFICANT CHANGE UP (ref 2–14)
NEUTROPHILS # BLD AUTO: 18.08 K/UL — HIGH (ref 1.8–7.4)
NEUTROPHILS NFR BLD AUTO: 91.6 % — HIGH (ref 43–77)
PCO2 BLDV: 46 MMHG — SIGNIFICANT CHANGE UP (ref 35–50)
PH BLDV: 7.36 — SIGNIFICANT CHANGE UP (ref 7.32–7.43)
PLATELET # BLD AUTO: 128 K/UL — LOW (ref 150–400)
PLATELET # BLD AUTO: 145 K/UL — LOW (ref 150–400)
PO2 BLDV: 39 MMHG — SIGNIFICANT CHANGE UP (ref 25–45)
POTASSIUM SERPL-MCNC: 4.5 MMOL/L — SIGNIFICANT CHANGE UP (ref 3.5–5.3)
POTASSIUM SERPL-MCNC: 4.7 MMOL/L — SIGNIFICANT CHANGE UP (ref 3.5–5.3)
POTASSIUM SERPL-SCNC: 4.5 MMOL/L — SIGNIFICANT CHANGE UP (ref 3.5–5.3)
POTASSIUM SERPL-SCNC: 4.7 MMOL/L — SIGNIFICANT CHANGE UP (ref 3.5–5.3)
PROT SERPL-MCNC: 4.9 G/DL — LOW (ref 6.6–8.7)
PROT SERPL-MCNC: 5.1 G/DL — LOW (ref 6.6–8.7)
RBC # BLD: 3.38 M/UL — LOW (ref 4.2–5.8)
RBC # BLD: 3.71 M/UL — LOW (ref 4.2–5.8)
RBC # FLD: 14.8 % — HIGH (ref 10.3–14.5)
RBC # FLD: 15.2 % — HIGH (ref 10.3–14.5)
SAO2 % BLDV: 74 % — SIGNIFICANT CHANGE UP
SODIUM SERPL-SCNC: 143 MMOL/L — SIGNIFICANT CHANGE UP (ref 135–145)
SODIUM SERPL-SCNC: 145 MMOL/L — SIGNIFICANT CHANGE UP (ref 135–145)
WBC # BLD: 16.11 K/UL — HIGH (ref 3.8–10.5)
WBC # BLD: 19.73 K/UL — HIGH (ref 3.8–10.5)
WBC # FLD AUTO: 16.11 K/UL — HIGH (ref 3.8–10.5)
WBC # FLD AUTO: 19.73 K/UL — HIGH (ref 3.8–10.5)

## 2020-11-14 PROCEDURE — 99024 POSTOP FOLLOW-UP VISIT: CPT

## 2020-11-14 PROCEDURE — 71045 X-RAY EXAM CHEST 1 VIEW: CPT | Mod: 26

## 2020-11-14 PROCEDURE — 93010 ELECTROCARDIOGRAM REPORT: CPT

## 2020-11-14 PROCEDURE — 99233 SBSQ HOSP IP/OBS HIGH 50: CPT

## 2020-11-14 RX ORDER — ALBUMIN HUMAN 25 %
250 VIAL (ML) INTRAVENOUS ONCE
Refills: 0 | Status: COMPLETED | OUTPATIENT
Start: 2020-11-14 | End: 2020-11-14

## 2020-11-14 RX ORDER — NOREPINEPHRINE BITARTRATE/D5W 8 MG/250ML
0.05 PLASTIC BAG, INJECTION (ML) INTRAVENOUS
Qty: 8 | Refills: 0 | Status: DISCONTINUED | OUTPATIENT
Start: 2020-11-14 | End: 2020-11-16

## 2020-11-14 RX ORDER — EPINEPHRINE 0.3 MG/.3ML
0.03 INJECTION INTRAMUSCULAR; SUBCUTANEOUS
Qty: 4 | Refills: 0 | Status: DISCONTINUED | OUTPATIENT
Start: 2020-11-14 | End: 2020-11-14

## 2020-11-14 RX ORDER — ASPIRIN/CALCIUM CARB/MAGNESIUM 324 MG
325 TABLET ORAL DAILY
Refills: 0 | Status: DISCONTINUED | OUTPATIENT
Start: 2020-11-14 | End: 2020-11-14

## 2020-11-14 RX ORDER — EPINEPHRINE 0.3 MG/.3ML
0.05 INJECTION INTRAMUSCULAR; SUBCUTANEOUS
Qty: 4 | Refills: 0 | Status: DISCONTINUED | OUTPATIENT
Start: 2020-11-14 | End: 2020-11-17

## 2020-11-14 RX ORDER — VASOPRESSIN 20 [USP'U]/ML
0.07 INJECTION INTRAVENOUS
Qty: 50 | Refills: 0 | Status: DISCONTINUED | OUTPATIENT
Start: 2020-11-14 | End: 2020-11-16

## 2020-11-14 RX ORDER — VANCOMYCIN HCL 1 G
1000 VIAL (EA) INTRAVENOUS DAILY
Refills: 0 | Status: DISCONTINUED | OUTPATIENT
Start: 2020-11-15 | End: 2020-11-15

## 2020-11-14 RX ORDER — FENTANYL CITRATE 50 UG/ML
50 INJECTION INTRAVENOUS ONCE
Refills: 0 | Status: DISCONTINUED | OUTPATIENT
Start: 2020-11-14 | End: 2020-11-14

## 2020-11-14 RX ORDER — EPINEPHRINE 0.3 MG/.3ML
0.11 INJECTION INTRAMUSCULAR; SUBCUTANEOUS
Qty: 8 | Refills: 0 | Status: DISCONTINUED | OUTPATIENT
Start: 2020-11-14 | End: 2020-11-14

## 2020-11-14 RX ORDER — HEPARIN SODIUM 5000 [USP'U]/ML
5000 INJECTION INTRAVENOUS; SUBCUTANEOUS EVERY 8 HOURS
Refills: 0 | Status: DISCONTINUED | OUTPATIENT
Start: 2020-11-15 | End: 2020-11-16

## 2020-11-14 RX ORDER — ASPIRIN/CALCIUM CARB/MAGNESIUM 324 MG
324 TABLET ORAL DAILY
Refills: 0 | Status: DISCONTINUED | OUTPATIENT
Start: 2020-11-14 | End: 2020-11-16

## 2020-11-14 RX ORDER — PANTOPRAZOLE SODIUM 20 MG/1
40 TABLET, DELAYED RELEASE ORAL EVERY 12 HOURS
Refills: 0 | Status: DISCONTINUED | OUTPATIENT
Start: 2020-11-14 | End: 2020-11-19

## 2020-11-14 RX ADMIN — PROPOFOL 25 MICROGRAM(S)/KG/MIN: 10 INJECTION, EMULSION INTRAVENOUS at 01:05

## 2020-11-14 RX ADMIN — LEVETIRACETAM 750 MILLIGRAM(S): 250 TABLET, FILM COATED ORAL at 05:08

## 2020-11-14 RX ADMIN — Medication 324 MILLIGRAM(S): at 13:02

## 2020-11-14 RX ADMIN — EPINEPHRINE 10 MICROGRAM(S)/KG/MIN: 0.3 INJECTION INTRAMUSCULAR; SUBCUTANEOUS at 00:34

## 2020-11-14 RX ADMIN — CHLORHEXIDINE GLUCONATE 15 MILLILITER(S): 213 SOLUTION TOPICAL at 17:55

## 2020-11-14 RX ADMIN — Medication 4 MILLIGRAM(S): at 00:01

## 2020-11-14 RX ADMIN — FENTANYL CITRATE 50 MICROGRAM(S): 50 INJECTION INTRAVENOUS at 12:15

## 2020-11-14 RX ADMIN — Medication 4 MILLIGRAM(S): at 05:25

## 2020-11-14 RX ADMIN — Medication 100 MILLIGRAM(S): at 21:27

## 2020-11-14 RX ADMIN — ATORVASTATIN CALCIUM 40 MILLIGRAM(S): 80 TABLET, FILM COATED ORAL at 21:26

## 2020-11-14 RX ADMIN — Medication 125 MILLILITER(S): at 00:32

## 2020-11-14 RX ADMIN — CHLORHEXIDINE GLUCONATE 1 APPLICATION(S): 213 SOLUTION TOPICAL at 13:03

## 2020-11-14 RX ADMIN — Medication 100 MILLIGRAM(S): at 13:02

## 2020-11-14 RX ADMIN — PANTOPRAZOLE SODIUM 40 MILLIGRAM(S): 20 TABLET, DELAYED RELEASE ORAL at 13:02

## 2020-11-14 RX ADMIN — FENTANYL CITRATE 50 MICROGRAM(S): 50 INJECTION INTRAVENOUS at 12:30

## 2020-11-14 RX ADMIN — LEVETIRACETAM 750 MILLIGRAM(S): 250 TABLET, FILM COATED ORAL at 17:55

## 2020-11-14 RX ADMIN — Medication 125 MILLILITER(S): at 03:30

## 2020-11-14 RX ADMIN — MILRINONE LACTATE 12 MICROGRAM(S)/KG/MIN: 1 INJECTION, SOLUTION INTRAVENOUS at 01:21

## 2020-11-14 RX ADMIN — Medication 100 MILLIGRAM(S): at 05:36

## 2020-11-14 RX ADMIN — Medication 1 MILLIGRAM(S): at 13:02

## 2020-11-14 RX ADMIN — PROPOFOL 25 MICROGRAM(S)/KG/MIN: 10 INJECTION, EMULSION INTRAVENOUS at 23:30

## 2020-11-14 RX ADMIN — CHLORHEXIDINE GLUCONATE 15 MILLILITER(S): 213 SOLUTION TOPICAL at 05:08

## 2020-11-14 NOTE — PROGRESS NOTE ADULT - SUBJECTIVE AND OBJECTIVE BOX
[facility] DIVISION OF KIDNEY DISEASES AND HYPERTENSION -- FOLLOW UP NOTE  --------------------------------------------------------------------------------  Chief Complaint:  CKD V  24 hour events/subjective:  Seen/examined  On pressor support  Making urine 20-40cc/hr  Holding off on HD today;  Remains intubated;      PAST HISTORY  --------------------------------------------------------------------------------  No significant changes to PMH, PSH, FHx, SHx, unless otherwise noted    ALLERGIES & MEDICATIONS  --------------------------------------------------------------------------------  Allergies    penicillin (Unknown)    Intolerances      Standing Inpatient Medications  aspirin  chewable 324 milliGRAM(s) Oral daily  atorvastatin 40 milliGRAM(s) Oral at bedtime  cefuroxime  IVPB 1500 milliGRAM(s) IV Intermittent every 8 hours  chlorhexidine 0.12% Liquid 15 milliLiter(s) Oral Mucosa every 12 hours  chlorhexidine 2% Cloths 1 Application(s) Topical daily  dextrose 50% Injectable 50 milliLiter(s) IV Push every 15 minutes  dextrose 50% Injectable 25 milliLiter(s) IV Push every 15 minutes  folic acid 1 milliGRAM(s) Oral daily  insulin regular Infusion 8 Unit(s)/Hr IV Continuous <Continuous>  levETIRAcetam 750 milliGRAM(s) Oral two times a day  norepinephrine Infusion 0.05 MICROgram(s)/kG/Min IV Continuous <Continuous>  pantoprazole  Injectable 40 milliGRAM(s) IV Push every 12 hours  propofol Infusion 41.754 MICROgram(s)/kG/Min IV Continuous <Continuous>  sodium chloride 0.9%. 1000 milliLiter(s) IV Continuous <Continuous>  vasopressin Infusion 0.067 Unit(s)/Min IV Continuous <Continuous>    PRN Inpatient Medications      REVIEW OF SYSTEMS  --------------------------------------------------------------------------------  Unable to obtain    VITALS/PHYSICAL EXAM  --------------------------------------------------------------------------------  T(C): 35.8 (11-14-20 @ 11:00), Max: 36.8 (11-13-20 @ 23:00)  HR: 84 (11-14-20 @ 11:00) (83 - 100)  BP: --  RR: 10 (11-14-20 @ 11:00) (0 - 16)  SpO2: 98% (11-14-20 @ 11:00) (97% - 100%)  Wt(kg): --        11-13-20 @ 07:01  -  11-14-20 @ 07:00  --------------------------------------------------------  IN: 5619 mL / OUT: 1375 mL / NET: 4244 mL    11-14-20 @ 07:01  -  11-14-20 @ 12:02  --------------------------------------------------------  IN: 261 mL / OUT: 280 mL / NET: -19 mL      Physical Exam:  	Gen: NAD   	HEENT: ETT  	Pulm: CTA B/L  	CV: RRR, S1S2; no rub  	Back: No spinal or CVA tenderness; no sacral edema  	Abd: +BS, soft, nontender/nondistended  	: painting+  	UE: Warm, FROM, no clubbing, intact strength; +edema; no asterixis  	LE: Warm, FROM, no clubbing, intact strength; +edema  	Neuro: sed  	Psych: sed  	Skin: Warm, without rashes     LABS/STUDIES  --------------------------------------------------------------------------------              11.0   19.73 >-----------<  145      [11-14-20 @ 03:10]              33.1     143  |  107  |  32.0  ----------------------------<  154      [11-14-20 @ 03:10]  4.5   |  25.0  |  2.51        Ca     8.8     [11-14-20 @ 03:10]      Mg     2.6     [11-14-20 @ 03:10]      Phos  1.8     [11-13-20 @ 17:33]    TPro  4.9  /  Alb  3.0  /  TBili  2.7  /  DBili  x   /  AST  92  /  ALT  19  /  AlkPhos  32  [11-14-20 @ 03:10]    PT/INR: PT 10.5 , INR 0.90       [11-13-20 @ 17:33]  PTT: 35.1       [11-13-20 @ 17:33]      Creatinine Trend:  SCr 2.51 [11-14 @ 03:10]  SCr 2.07 [11-13 @ 19:52]  SCr 2.04 [11-13 @ 17:33]  SCr 2.80 [11-13 @ 06:36]  SCr 1.96 [11-12 @ 20:34]        Iron 29, TIBC 332, %sat 9      [11-03-20 @ 08:18]  Ferritin 230      [11-03-20 @ 08:18]  TSH 1.95      [11-03-20 @ 08:18]  Lipid: chol 154, , HDL 44, LDL --      [11-03-20 @ 08:18]    HBsAb <3.0      [11-07-20 @ 05:41]  HBsAg Nonreact      [11-07-20 @ 05:41]  HBcAb Nonreact      [11-07-20 @ 05:41]  HCV 0.07, Nonreact      [11-07-20 @ 05:41]

## 2020-11-14 NOTE — PROGRESS NOTE ADULT - SUBJECTIVE AND OBJECTIVE BOX
Subjective - patient intubated and sedated at present. unable to participate in HPI or ROS.    Brief summary:  60yMale POD# AVR/MVR/C2V    Overnight events: None. Critically stable on multi-pressor and multi-inotrope support.       PAST MEDICAL & SURGICAL HISTORY:  CVA (cerebral vascular accident)    CHF (congestive heart failure)    H/O colectomy    Aorta disorder          albumin human  5% IVPB 250 milliLiter(s) IV Intermittent every 1 hour  aspirin enteric coated 325 milliGRAM(s) Oral daily  atorvastatin 40 milliGRAM(s) Oral at bedtime  cefuroxime  IVPB 1500 milliGRAM(s) IV Intermittent every 8 hours  chlorhexidine 0.12% Liquid 15 milliLiter(s) Oral Mucosa every 12 hours  chlorhexidine 2% Cloths 1 Application(s) Topical daily  dexAMETHasone  Injectable 6 milliGRAM(s) IV Push every 6 hours  dextrose 50% Injectable 50 milliLiter(s) IV Push every 15 minutes  dextrose 50% Injectable 25 milliLiter(s) IV Push every 15 minutes  EPINEPHrine    Infusion 0.027 MICROgram(s)/kG/Min IV Continuous <Continuous>  folic acid 1 milliGRAM(s) Oral daily  insulin regular Infusion 8 Unit(s)/Hr IV Continuous <Continuous>  levETIRAcetam 750 milliGRAM(s) Oral two times a day  meperidine     Injectable 25 milliGRAM(s) IV Push once  milrinone Infusion 0.4 MICROgram(s)/kG/Min IV Continuous <Continuous>  norepinephrine Infusion 0.05 MICROgram(s)/kG/Min IV Continuous <Continuous>  pantoprazole  Injectable 40 milliGRAM(s) IV Push daily  potassium chloride  10 mEq/50 mL IVPB 10 milliEquivalent(s) IV Intermittent every 1 hour  potassium chloride  10 mEq/50 mL IVPB 10 milliEquivalent(s) IV Intermittent every 1 hour  potassium chloride  10 mEq/50 mL IVPB 10 milliEquivalent(s) IV Intermittent every 1 hour  propofol Infusion 41.754 MICROgram(s)/kG/Min IV Continuous <Continuous>  sodium chloride 0.9%. 1000 milliLiter(s) IV Continuous <Continuous>  vancomycin  IVPB 1000 milliGRAM(s) IV Intermittent every 12 hours  vasopressin Infusion 0.133 Unit(s)/Min IV Continuous <Continuous>  MEDICATIONS  (PRN):    Mode: AC/ CMV (Assist Control/ Continuous Mandatory Ventilation), RR (machine): 10, TV (machine): 800, FiO2: 60, PEEP: 5, MAP: 11, PIP: 33  Daily     Daily       ABG - ( 14 Nov 2020 00:00 )  pH, Arterial: 7.40  pH, Blood: x     /  pCO2: 38    /  pO2: 155   / HCO3: 24    / Base Excess: -1.1  /  SaO2: 100                                     11.7   17.43 )-----------( 177      ( 13 Nov 2020 19:52 )             34.5   11-13    146<H>  |  109<H>  |  27.0<H>  ----------------------------<  111<H>  3.9   |  24.0  |  2.07<H>    Ca    9.1      13 Nov 2020 19:52  Phos  1.8     11-13  Mg     2.7     11-13    TPro  4.9<L>  /  Alb  3.0<L>  /  TBili  2.9<H>  /  DBili  x   /  AST  85<H>  /  ALT  20  /  AlkPhos  34<L>  11-13      PT/INR - ( 13 Nov 2020 17:33 )   PT: 10.5 sec;   INR: 0.90 ratio         PTT - ( 13 Nov 2020 17:33 )  PTT:35.1 sec      Objective:  T(C): 36.2 (11-14-20 @ 01:30), Max: 37 (11-13-20 @ 05:45)  HR: 83 (11-14-20 @ 01:45) (74 - 100)  BP: 124/51 (11-13-20 @ 07:15) (124/51 - 163/83)  RR: 10 (11-14-20 @ 01:45) (10 - 20)  SpO2: 100% (11-14-20 @ 01:45) (100% - 100%)  Wt(kg): --CAPILLARY BLOOD GLUCOSE      POCT Blood Glucose.: 161 mg/dL (14 Nov 2020 01:00)  POCT Blood Glucose.: 153 mg/dL (13 Nov 2020 23:55)  POCT Blood Glucose.: 176 mg/dL (13 Nov 2020 23:01)  POCT Blood Glucose.: 190 mg/dL (13 Nov 2020 22:24)  POCT Blood Glucose.: 65 mg/dL (13 Nov 2020 21:16)  POCT Blood Glucose.: 80 mg/dL (13 Nov 2020 20:20)  I&O's Summary    12 Nov 2020 07:01  -  13 Nov 2020 07:00  --------------------------------------------------------  IN: 0 mL / OUT: 200 mL / NET: -200 mL    13 Nov 2020 07:01  -  14 Nov 2020 01:50  --------------------------------------------------------  IN: 4605.5 mL / OUT: 970 mL / NET: 3635.5 mL        Physical Exam  Neuro: Intubated and sedated. Unable to assess.  Pulm: CTA, equal bilaterally, on ventilator, no wheezes, rales.   CV: RRR, +S1S2, no m/r/g  Abd: soft, NT, ND, +BS  Ext: +DP Pulses b/l, , no edema  Inc: MSI C/D/I/stable w/ dressing, lower extremity C/D/I with Ace wrap  Chest tubes: Mediastinal and pleural chest tubes to suction, no air leaks, draining appropriately.     Imaging:  CXR: < from: Xray Chest 1 View AP/PA. (11.13.20 @ 16:32) >        INTERPRETATION:  AP chest on November 13, 2020 at 4:17 PM. Patient had redo heart surgery. Left lateral chest is cut off the study.    Heart enlargement and sternotomy again noted. Presently there is a prosthetic mitral valve new since November 12.    Endotracheal tube, nasogastric tube, left jugular line are presently seen.    Also there is a right jugular line and bilateral chest tubes.    There is a mild central congestive picture somewhat increased from preop study.    IMPRESSION: Redo heart surgery. There are some mild diffuse congestive changes at this time.    < end of copied text >

## 2020-11-14 NOTE — PROGRESS NOTE ADULT - ASSESSMENT
59 y/o M with PMH of CAD s/p stent in LAD, leaky aortic valve, uncontrolled bp, s/p aortic dissection repair 2010, CVA left sided residual weakness, s/p colostomy with reversal, presents from home with sob and LE edema. Echo indicating EF 55% moderate severe, MR, Moderate TR, mod-sever AI, grade II diastolic dysfunction. SANTOS/CKD receiving HD via right IJ permacath. Pt getting EGD and Coloscopy 11/9, 2/2 to anemia. Plan for Cath 11/10 with Dr. Debbie sterlinged by HD as per renal.     11/14 now POD1 reop C2V, AVR/MVR. Critically stable on multi-pressor and multi-inotrope support, s/p multiple blood products. Oliguric on HD, plan for next HD session in AM.

## 2020-11-14 NOTE — PROGRESS NOTE ADULT - PROBLEM SELECTOR PLAN 7
ASA for acute graft patency prophylaxis. Lipitor for chronic graft patency prophylaxis. Lovenox for DVT prophylaxis. Vanco/zinacef for infection prophylaxis. BB when able for afib prophylaxis. Protonix for GIB prophylaxis.

## 2020-11-14 NOTE — PROGRESS NOTE ADULT - PROBLEM SELECTOR PLAN 1
Remains intubated and sedated. S/p multiple blood products including 3 PRBC post-operatively.    Q2H abg, [place]-rodrigo catheter in place, monitoring Cardiac index hourly and continuous SVo2. Index stable >2 with inotropic support. Decreased RV function - epinephrine, milrinone, inhaled NO. Fluid responsive. Lactate downtrended to normal.     Currently AV paced at 85, underlying junctional in 50s.    Plan to wean sedation later in AM and possibly extubate. Wean Inhaled NO as tolerated. Wean pressors and inotropes as tolerated.     Facial edema s/p multiple blood products in OR. Likely secondary to volume overload. Resolved significantly. Decadron Q6 for presumed airway edema.

## 2020-11-14 NOTE — PROGRESS NOTE ADULT - SUBJECTIVE AND OBJECTIVE BOX
[facility]                                                    [address] , [address], [place] [address]                                                         PHONE: [phone]    FAX: [phone] [address], [address], [place] [address]                                                 PHONE: [phone]    FAX: [phone]  *******************************************************************************  JONATHAN GIBSON is a 60y man with HTN, HLD, CAD, ascending aortic dissection s/p repair (2001), chronic diastolic HFpEF, moderate valvular heart disease including moderate aortic and mitral regurgitation, admitted with CP/elevated troponin.  - Echocardiogram 11/4/20 EF 50-55%, grade II diastolic dysfunction, mildly reduced RV systolic function, severe LAE, mild KOLTON, mod-severe MR and AI, mod TR, dilated aortic root @ 4.2cm  - Cardiac cath 11/10/20: severe 2 vessel CAD.  Chronically occluded proximal RCA with collaterals from the LCA.  EF 50%.  Elevated LVEDP.  - s/p AVR/MVR/CABG x 2 with Dr Butler      Overnight events/Subjective Assessment:  Intubated, paced rhythm, slowly weaning from pressor support    penicillin (Unknown)    MEDICATIONS  (STANDING):  aspirin  chewable 324 milliGRAM(s) Oral daily  atorvastatin 40 milliGRAM(s) Oral at bedtime  cefuroxime  IVPB 1500 milliGRAM(s) IV Intermittent every 8 hours  chlorhexidine 0.12% Liquid 15 milliLiter(s) Oral Mucosa every 12 hours  chlorhexidine 2% Cloths 1 Application(s) Topical daily  dextrose 50% Injectable 50 milliLiter(s) IV Push every 15 minutes  dextrose 50% Injectable 25 milliLiter(s) IV Push every 15 minutes  folic acid 1 milliGRAM(s) Oral daily  insulin regular Infusion 8 Unit(s)/Hr (8 mL/Hr) IV Continuous <Continuous>  levETIRAcetam 750 milliGRAM(s) Oral two times a day  norepinephrine Infusion 0.05 MICROgram(s)/kG/Min (9.36 mL/Hr) IV Continuous <Continuous>  pantoprazole  Injectable 40 milliGRAM(s) IV Push every 12 hours  propofol Infusion 41.754 MICROgram(s)/kG/Min (25 mL/Hr) IV Continuous <Continuous>  sodium chloride 0.9%. 1000 milliLiter(s) (10 mL/Hr) IV Continuous <Continuous>  vasopressin Infusion 0.067 Unit(s)/Min (4 mL/Hr) IV Continuous <Continuous>    MEDICATIONS  (PRN):      Vital Signs Last 24 Hrs  T(C): 35.8 (14 Nov 2020 11:00), Max: 36.8 (13 Nov 2020 23:00)  T(F): 96.4 (14 Nov 2020 11:00), Max: 98.2 (13 Nov 2020 23:00)  HR: 84 (14 Nov 2020 11:00) (83 - 100)  BP: --  BP(mean): --  RR: 10 (14 Nov 2020 11:00) (0 - 16)  SpO2: 98% (14 Nov 2020 11:00) (97% - 100%)    I&O's Detail    13 Nov 2020 07:01  -  14 Nov 2020 07:00  --------------------------------------------------------  IN:    Albumin 5%  - 250 mL: 1750 mL    Enteral Tube Flush: 105 mL    EPINEPHrine: 280 mL    Insulin: 70 mL    IV PiggyBack: 100 mL    IV PiggyBack: 100 mL    IV PiggyBack: 50 mL    IV PiggyBack: 250 mL    Lactated Ringers Bolus: 1000 mL    Milrinone: 99 mL    Norepinephrine: 387 mL    PRBCs (Packed Red Blood Cells): 942 mL    Propofol: 138 mL    sodium chloride 0.9%: 270 mL    Vasopressin: 78 mL  Total IN: 5619 mL    OUT:    Chest Tube (mL): 170 mL    Chest Tube (mL): 320 mL    Chest Tube (mL): 115 mL    Chest Tube (mL): 340 mL    Indwelling Catheter - Urethral (mL): 330 mL    Nasogastric/Oral tube (mL): 100 mL  Total OUT: 1375 mL    Total NET: 4244 mL      14 Nov 2020 07:01  -  14 Nov 2020 11:39  --------------------------------------------------------  IN:    EPINEPHrine: 60 mL    Insulin: 11 mL    Milrinone: 12 mL    Norepinephrine: 46 mL    Norepinephrine: 12 mL    Propofol: 24 mL    sodium chloride 0.9%: 80 mL    Vasopressin: 4 mL    Vasopressin: 12 mL  Total IN: 261 mL    OUT:    Chest Tube (mL): 70 mL    Chest Tube (mL): 10 mL    Chest Tube (mL): 40 mL    Chest Tube (mL): 80 mL    Indwelling Catheter - Urethral (mL): 80 mL  Total OUT: 280 mL    Total NET: -19 mL        I&O's Summary    13 Nov 2020 07:01  -  14 Nov 2020 07:00  --------------------------------------------------------  IN: 5619 mL / OUT: 1375 mL / NET: 4244 mL    14 Nov 2020 07:01  -  14 Nov 2020 11:39  --------------------------------------------------------  IN: 261 mL / OUT: 280 mL / NET: -19 mL        Mode: AC/ CMV (Assist Control/ Continuous Mandatory Ventilation), RR (machine): 10, TV (machine): 800, FiO2: 40, PEEP: 5, MAP: 10, PIP: 35    PHYSICAL EXAM:  General: Intubated/sedated on the vent  HEENT: Head: normocephalic, atraumatic  Eyes: Pupils equal and reactive  Neck: Supple, no carotid bruit, no JVD, no HJR  CARDIOVASCULAR: Prosthetic aortic/mitral sounds, ALLIE  LUNGS: bibasilar rhonchi  ABDOMEN: Soft, nontender, non-distended, positive bowel sounds, no mass or bruit  EXTREMITIES: 1+ LE edema, distal pulses WNL  SKIN: Warm and dry with normal turgor  NEURO: intubated and sedated on the vent          LABS:                        11.0   19.73 )-----------( 145      ( 14 Nov 2020 03:10 )             33.1     11-14    143  |  107  |  32.0<H>  ----------------------------<  154<H>  4.5   |  25.0  |  2.51<H>    Ca    8.8      14 Nov 2020 03:10  Phos  1.8     11-13  Mg     2.6     11-14    TPro  4.9<L>  /  Alb  3.0<L>  /  TBili  2.7<H>  /  DBili  x   /  AST  92<H>  /  ALT  19  /  AlkPhos  32<L>  11-14        PT/INR - ( 13 Nov 2020 17:33 )   PT: 10.5 sec;   INR: 0.90 ratio         PTT - ( 13 Nov 2020 17:33 )  PTT:35.1 sec  serum    Thyroid Stimulating Hormone, Serum: 1.95 uIU/mL (11-03 @ 08:18)      RADIOLOGY & ADDITIONAL STUDIES:  CXR mild congestive changes postop    TELEMETRY: AV paced rhythm    ASSESSMENT AND PLAN:  In summary, JONATHAN GIBSON is a 60y Male with past medical history significant for HTN, HLD, CAD, ascending aortic dissection s/p repair (2001), chronic diastolic HFpEF, moderate valvular heart disease including moderate aortic and mitral regurgitation, admitted with CP/SOB/elevated troponin.  - Echocardiogram 11/4/20 EF 50-55%, grade II diastolic dysfunction, mildly reduced RV systolic function, severe LAE, mild KOLTON, mod-severe MR and AI, mod TR, dilated aortic root @ 4.2cm  - Cardiac cath 11/10/20: severe 2 vessel CAD.  Chronically occluded proximal RCA with collaterals from the LCA.  EF 50%.  Elevated LVEDP.  - s/p AVR/MVR/CABG x 2 with Dr Butler    Making slow and steady hemodynamic improvement  For HD as per Renal  Continue Keppra for seizures  Agree with CTS management plan (discussed with CTS team)    Tristian Lui MD

## 2020-11-14 NOTE — PROGRESS NOTE ADULT - ASSESSMENT
1) Martin -Started on HD before cardiac surgery for optimization  2) chronic diastolic HFpEF, moderate valvular heart disease including moderate aortic and mitral regurgitation  3) Anemia of renal disease  4) CAD; elevated LVEDP    Holding HD today  Continue retacrit; anemia below goal  Trend renal fx  Will assess for need of CVVHDF;  evan CTS PA

## 2020-11-15 LAB
ALBUMIN SERPL ELPH-MCNC: 3.2 G/DL — LOW (ref 3.3–5.2)
ALP SERPL-CCNC: 35 U/L — LOW (ref 40–120)
ALT FLD-CCNC: 18 U/L — SIGNIFICANT CHANGE UP
ANION GAP SERPL CALC-SCNC: 16 MMOL/L — SIGNIFICANT CHANGE UP (ref 5–17)
ANION GAP SERPL CALC-SCNC: 16 MMOL/L — SIGNIFICANT CHANGE UP (ref 5–17)
ANISOCYTOSIS BLD QL: SLIGHT — SIGNIFICANT CHANGE UP
AST SERPL-CCNC: 61 U/L — HIGH
BASE EXCESS BLDV CALC-SCNC: -2.1 MMOL/L — LOW (ref -2–2)
BASOPHILS # BLD AUTO: 0 K/UL — SIGNIFICANT CHANGE UP (ref 0–0.2)
BASOPHILS NFR BLD AUTO: 0 % — SIGNIFICANT CHANGE UP (ref 0–2)
BILIRUB SERPL-MCNC: 1.3 MG/DL — SIGNIFICANT CHANGE UP (ref 0.4–2)
BLOOD GAS COMMENTS, VENOUS: SIGNIFICANT CHANGE UP
BUN SERPL-MCNC: 45 MG/DL — HIGH (ref 8–20)
BUN SERPL-MCNC: 51 MG/DL — HIGH (ref 8–20)
BURR CELLS BLD QL SMEAR: PRESENT — SIGNIFICANT CHANGE UP
CA-I SERPL-SCNC: 1.1 MMOL/L — LOW (ref 1.15–1.33)
CALCIUM SERPL-MCNC: 8.4 MG/DL — LOW (ref 8.6–10.2)
CALCIUM SERPL-MCNC: 8.9 MG/DL — SIGNIFICANT CHANGE UP (ref 8.6–10.2)
CHLORIDE BLDV-SCNC: 108 MMOL/L — HIGH (ref 98–107)
CHLORIDE SERPL-SCNC: 107 MMOL/L — SIGNIFICANT CHANGE UP (ref 98–107)
CHLORIDE SERPL-SCNC: 107 MMOL/L — SIGNIFICANT CHANGE UP (ref 98–107)
CO2 SERPL-SCNC: 21 MMOL/L — LOW (ref 22–29)
CO2 SERPL-SCNC: 22 MMOL/L — SIGNIFICANT CHANGE UP (ref 22–29)
CREAT SERPL-MCNC: 3.6 MG/DL — HIGH (ref 0.5–1.3)
CREAT SERPL-MCNC: 3.94 MG/DL — HIGH (ref 0.5–1.3)
ELLIPTOCYTES BLD QL SMEAR: SLIGHT — SIGNIFICANT CHANGE UP
EOSINOPHIL # BLD AUTO: 0 K/UL — SIGNIFICANT CHANGE UP (ref 0–0.5)
EOSINOPHIL NFR BLD AUTO: 0 % — SIGNIFICANT CHANGE UP (ref 0–6)
GAS PNL BLDA: SIGNIFICANT CHANGE UP
GAS PNL BLDV: 143 MMOL/L — SIGNIFICANT CHANGE UP (ref 135–145)
GAS PNL BLDV: SIGNIFICANT CHANGE UP
GAS PNL BLDV: SIGNIFICANT CHANGE UP
GLUCOSE BLDC GLUCOMTR-MCNC: 104 MG/DL — HIGH (ref 70–99)
GLUCOSE BLDC GLUCOMTR-MCNC: 105 MG/DL — HIGH (ref 70–99)
GLUCOSE BLDC GLUCOMTR-MCNC: 107 MG/DL — HIGH (ref 70–99)
GLUCOSE BLDC GLUCOMTR-MCNC: 112 MG/DL — HIGH (ref 70–99)
GLUCOSE BLDC GLUCOMTR-MCNC: 117 MG/DL — HIGH (ref 70–99)
GLUCOSE BLDC GLUCOMTR-MCNC: 118 MG/DL — HIGH (ref 70–99)
GLUCOSE BLDC GLUCOMTR-MCNC: 120 MG/DL — HIGH (ref 70–99)
GLUCOSE BLDC GLUCOMTR-MCNC: 126 MG/DL — HIGH (ref 70–99)
GLUCOSE BLDC GLUCOMTR-MCNC: 136 MG/DL — HIGH (ref 70–99)
GLUCOSE BLDC GLUCOMTR-MCNC: 137 MG/DL — HIGH (ref 70–99)
GLUCOSE BLDC GLUCOMTR-MCNC: 141 MG/DL — HIGH (ref 70–99)
GLUCOSE BLDC GLUCOMTR-MCNC: 89 MG/DL — SIGNIFICANT CHANGE UP (ref 70–99)
GLUCOSE BLDC GLUCOMTR-MCNC: 97 MG/DL — SIGNIFICANT CHANGE UP (ref 70–99)
GLUCOSE BLDV-MCNC: 108 MG/DL — HIGH (ref 70–99)
GLUCOSE SERPL-MCNC: 118 MG/DL — HIGH (ref 70–99)
GLUCOSE SERPL-MCNC: 134 MG/DL — HIGH (ref 70–99)
HCO3 BLDV-SCNC: 22 MMOL/L — SIGNIFICANT CHANGE UP (ref 21–29)
HCT VFR BLD CALC: 30.6 % — LOW (ref 39–50)
HCT VFR BLD CALC: 30.8 % — LOW (ref 39–50)
HCT VFR BLD CALC: 32.4 % — LOW (ref 39–50)
HCT VFR BLDA CALC: 33 — LOW (ref 39–50)
HGB BLD CALC-MCNC: 10.7 G/DL — LOW (ref 13–17)
HGB BLD-MCNC: 10.2 G/DL — LOW (ref 13–17)
HGB BLD-MCNC: 10.3 G/DL — LOW (ref 13–17)
HGB BLD-MCNC: 10.4 G/DL — LOW (ref 13–17)
HOROWITZ INDEX BLDV+IHG-RTO: SIGNIFICANT CHANGE UP
INR BLD: 1.25 RATIO — HIGH (ref 0.88–1.16)
LACTATE BLDV-MCNC: 1 MMOL/L — SIGNIFICANT CHANGE UP (ref 0.5–2)
LYMPHOCYTES # BLD AUTO: 0.59 K/UL — LOW (ref 1–3.3)
LYMPHOCYTES # BLD AUTO: 3.5 % — LOW (ref 13–44)
MACROCYTES BLD QL: SLIGHT — SIGNIFICANT CHANGE UP
MAGNESIUM SERPL-MCNC: 2.8 MG/DL — HIGH (ref 1.6–2.6)
MANUAL SMEAR VERIFICATION: SIGNIFICANT CHANGE UP
MCHC RBC-ENTMCNC: 30.3 PG — SIGNIFICANT CHANGE UP (ref 27–34)
MCHC RBC-ENTMCNC: 30.5 PG — SIGNIFICANT CHANGE UP (ref 27–34)
MCHC RBC-ENTMCNC: 30.8 PG — SIGNIFICANT CHANGE UP (ref 27–34)
MCHC RBC-ENTMCNC: 32.1 GM/DL — SIGNIFICANT CHANGE UP (ref 32–36)
MCHC RBC-ENTMCNC: 33.3 GM/DL — SIGNIFICANT CHANGE UP (ref 32–36)
MCHC RBC-ENTMCNC: 33.4 GM/DL — SIGNIFICANT CHANGE UP (ref 32–36)
MCV RBC AUTO: 91.6 FL — SIGNIFICANT CHANGE UP (ref 80–100)
MCV RBC AUTO: 92.2 FL — SIGNIFICANT CHANGE UP (ref 80–100)
MCV RBC AUTO: 94.5 FL — SIGNIFICANT CHANGE UP (ref 80–100)
MICROCYTES BLD QL: SLIGHT — SIGNIFICANT CHANGE UP
MONOCYTES # BLD AUTO: 0.15 K/UL — SIGNIFICANT CHANGE UP (ref 0–0.9)
MONOCYTES NFR BLD AUTO: 0.9 % — LOW (ref 2–14)
NEUTROPHILS # BLD AUTO: 15.97 K/UL — HIGH (ref 1.8–7.4)
NEUTROPHILS NFR BLD AUTO: 94.7 % — HIGH (ref 43–77)
OTHER CELLS CSF MANUAL: 10 ML/DL — LOW (ref 18–22)
OVALOCYTES BLD QL SMEAR: SLIGHT — SIGNIFICANT CHANGE UP
PCO2 BLDV: 45 MMHG — SIGNIFICANT CHANGE UP (ref 35–50)
PH BLDV: 7.33 — SIGNIFICANT CHANGE UP (ref 7.32–7.43)
PLAT MORPH BLD: NORMAL — SIGNIFICANT CHANGE UP
PLATELET # BLD AUTO: 108 K/UL — LOW (ref 150–400)
PLATELET # BLD AUTO: 117 K/UL — LOW (ref 150–400)
PLATELET # BLD AUTO: 90 K/UL — LOW (ref 150–400)
PO2 BLDV: 40 MMHG — SIGNIFICANT CHANGE UP (ref 25–45)
POIKILOCYTOSIS BLD QL AUTO: SLIGHT — SIGNIFICANT CHANGE UP
POLYCHROMASIA BLD QL SMEAR: SLIGHT — SIGNIFICANT CHANGE UP
POTASSIUM BLDV-SCNC: 4.6 MMOL/L — HIGH (ref 3.4–4.5)
POTASSIUM SERPL-MCNC: 5 MMOL/L — SIGNIFICANT CHANGE UP (ref 3.5–5.3)
POTASSIUM SERPL-MCNC: 5 MMOL/L — SIGNIFICANT CHANGE UP (ref 3.5–5.3)
POTASSIUM SERPL-SCNC: 5 MMOL/L — SIGNIFICANT CHANGE UP (ref 3.5–5.3)
POTASSIUM SERPL-SCNC: 5 MMOL/L — SIGNIFICANT CHANGE UP (ref 3.5–5.3)
PROT SERPL-MCNC: 5.1 G/DL — LOW (ref 6.6–8.7)
PROTHROM AB SERPL-ACNC: 14.3 SEC — HIGH (ref 10.6–13.6)
RBC # BLD: 3.34 M/UL — LOW (ref 4.2–5.8)
RBC # BLD: 3.34 M/UL — LOW (ref 4.2–5.8)
RBC # BLD: 3.43 M/UL — LOW (ref 4.2–5.8)
RBC # FLD: 15.8 % — HIGH (ref 10.3–14.5)
RBC # FLD: 15.9 % — HIGH (ref 10.3–14.5)
RBC # FLD: 15.9 % — HIGH (ref 10.3–14.5)
RBC BLD AUTO: ABNORMAL
SAO2 % BLDV: 70 % — SIGNIFICANT CHANGE UP
SODIUM SERPL-SCNC: 144 MMOL/L — SIGNIFICANT CHANGE UP (ref 135–145)
SODIUM SERPL-SCNC: 144 MMOL/L — SIGNIFICANT CHANGE UP (ref 135–145)
TRIGL SERPL-MCNC: 105 MG/DL — SIGNIFICANT CHANGE UP
VANCOMYCIN TROUGH SERPL-MCNC: 21.4 UG/ML — HIGH (ref 10–20)
VARIANT LYMPHS # BLD: 0.9 % — SIGNIFICANT CHANGE UP (ref 0–6)
WBC # BLD: 16.86 K/UL — HIGH (ref 3.8–10.5)
WBC # BLD: 18.06 K/UL — HIGH (ref 3.8–10.5)
WBC # BLD: 19.25 K/UL — HIGH (ref 3.8–10.5)
WBC # FLD AUTO: 16.86 K/UL — HIGH (ref 3.8–10.5)
WBC # FLD AUTO: 18.06 K/UL — HIGH (ref 3.8–10.5)
WBC # FLD AUTO: 19.25 K/UL — HIGH (ref 3.8–10.5)

## 2020-11-15 PROCEDURE — 71045 X-RAY EXAM CHEST 1 VIEW: CPT | Mod: 26

## 2020-11-15 PROCEDURE — 90937 HEMODIALYSIS REPEATED EVAL: CPT

## 2020-11-15 PROCEDURE — 93010 ELECTROCARDIOGRAM REPORT: CPT

## 2020-11-15 PROCEDURE — 99024 POSTOP FOLLOW-UP VISIT: CPT

## 2020-11-15 RX ORDER — FENTANYL CITRATE 50 UG/ML
50 INJECTION INTRAVENOUS ONCE
Refills: 0 | Status: DISCONTINUED | OUTPATIENT
Start: 2020-11-15 | End: 2020-11-16

## 2020-11-15 RX ORDER — DEXMEDETOMIDINE HYDROCHLORIDE IN 0.9% SODIUM CHLORIDE 4 UG/ML
0.5 INJECTION INTRAVENOUS
Qty: 200 | Refills: 0 | Status: DISCONTINUED | OUTPATIENT
Start: 2020-11-15 | End: 2020-11-16

## 2020-11-15 RX ORDER — CALCIUM GLUCONATE 100 MG/ML
2 VIAL (ML) INTRAVENOUS ONCE
Refills: 0 | Status: COMPLETED | OUTPATIENT
Start: 2020-11-15 | End: 2020-11-15

## 2020-11-15 RX ORDER — ALBUMIN HUMAN 25 %
250 VIAL (ML) INTRAVENOUS ONCE
Refills: 0 | Status: COMPLETED | OUTPATIENT
Start: 2020-11-15 | End: 2020-11-15

## 2020-11-15 RX ORDER — FENTANYL CITRATE 50 UG/ML
50 INJECTION INTRAVENOUS
Refills: 0 | Status: DISCONTINUED | OUTPATIENT
Start: 2020-11-15 | End: 2020-11-17

## 2020-11-15 RX ORDER — VANCOMYCIN HCL 1 G
1000 VIAL (EA) INTRAVENOUS
Refills: 0 | Status: COMPLETED | OUTPATIENT
Start: 2020-11-15 | End: 2020-11-16

## 2020-11-15 RX ORDER — FENTANYL CITRATE 50 UG/ML
50 INJECTION INTRAVENOUS ONCE
Refills: 0 | Status: DISCONTINUED | OUTPATIENT
Start: 2020-11-15 | End: 2020-11-15

## 2020-11-15 RX ADMIN — HEPARIN SODIUM 5000 UNIT(S): 5000 INJECTION INTRAVENOUS; SUBCUTANEOUS at 22:05

## 2020-11-15 RX ADMIN — FENTANYL CITRATE 50 MICROGRAM(S): 50 INJECTION INTRAVENOUS at 18:51

## 2020-11-15 RX ADMIN — PANTOPRAZOLE SODIUM 40 MILLIGRAM(S): 20 TABLET, DELAYED RELEASE ORAL at 17:41

## 2020-11-15 RX ADMIN — Medication 324 MILLIGRAM(S): at 11:18

## 2020-11-15 RX ADMIN — ATORVASTATIN CALCIUM 40 MILLIGRAM(S): 80 TABLET, FILM COATED ORAL at 22:04

## 2020-11-15 RX ADMIN — FENTANYL CITRATE 50 MICROGRAM(S): 50 INJECTION INTRAVENOUS at 13:15

## 2020-11-15 RX ADMIN — Medication 200 GRAM(S): at 23:37

## 2020-11-15 RX ADMIN — FENTANYL CITRATE 50 MICROGRAM(S): 50 INJECTION INTRAVENOUS at 13:30

## 2020-11-15 RX ADMIN — DEXMEDETOMIDINE HYDROCHLORIDE IN 0.9% SODIUM CHLORIDE 12.5 MICROGRAM(S)/KG/HR: 4 INJECTION INTRAVENOUS at 08:37

## 2020-11-15 RX ADMIN — PROPOFOL 25 MICROGRAM(S)/KG/MIN: 10 INJECTION, EMULSION INTRAVENOUS at 22:04

## 2020-11-15 RX ADMIN — PANTOPRAZOLE SODIUM 40 MILLIGRAM(S): 20 TABLET, DELAYED RELEASE ORAL at 05:02

## 2020-11-15 RX ADMIN — CHLORHEXIDINE GLUCONATE 15 MILLILITER(S): 213 SOLUTION TOPICAL at 05:01

## 2020-11-15 RX ADMIN — CHLORHEXIDINE GLUCONATE 1 APPLICATION(S): 213 SOLUTION TOPICAL at 11:18

## 2020-11-15 RX ADMIN — Medication 1 MILLIGRAM(S): at 11:18

## 2020-11-15 RX ADMIN — HEPARIN SODIUM 5000 UNIT(S): 5000 INJECTION INTRAVENOUS; SUBCUTANEOUS at 13:52

## 2020-11-15 RX ADMIN — HEPARIN SODIUM 5000 UNIT(S): 5000 INJECTION INTRAVENOUS; SUBCUTANEOUS at 05:06

## 2020-11-15 RX ADMIN — CHLORHEXIDINE GLUCONATE 15 MILLILITER(S): 213 SOLUTION TOPICAL at 17:41

## 2020-11-15 RX ADMIN — Medication 100 MILLIGRAM(S): at 05:08

## 2020-11-15 RX ADMIN — Medication 125 MILLILITER(S): at 06:02

## 2020-11-15 RX ADMIN — Medication 250 MILLIGRAM(S): at 22:04

## 2020-11-15 RX ADMIN — LEVETIRACETAM 750 MILLIGRAM(S): 250 TABLET, FILM COATED ORAL at 17:41

## 2020-11-15 RX ADMIN — LEVETIRACETAM 750 MILLIGRAM(S): 250 TABLET, FILM COATED ORAL at 05:02

## 2020-11-15 NOTE — PROGRESS NOTE ADULT - SUBJECTIVE AND OBJECTIVE BOX
[facility] DIVISION OF KIDNEY DISEASES AND HYPERTENSION -- HEMODIALYSIS NOTE  --------------------------------------------------------------------------------  Chief Complaint: ATN/Ongoing hemodialysis requirement    24 hour events/subjective:  Pt seen/examined this AM  On three pressors;  CVVHDF starting; reevaluated;      PAST HISTORY  --------------------------------------------------------------------------------  No significant changes to PMH, PSH, FHx, SHx, unless otherwise noted    ALLERGIES & MEDICATIONS  --------------------------------------------------------------------------------  Allergies    penicillin (Unknown)    Intolerances      Standing Inpatient Medications  aspirin  chewable 324 milliGRAM(s) Oral daily  atorvastatin 40 milliGRAM(s) Oral at bedtime  chlorhexidine 0.12% Liquid 15 milliLiter(s) Oral Mucosa every 12 hours  chlorhexidine 2% Cloths 1 Application(s) Topical daily  CRRT Treatment    <Continuous>  dexMEDEtomidine Infusion 0.5 MICROgram(s)/kG/Hr IV Continuous <Continuous>  dextrose 50% Injectable 50 milliLiter(s) IV Push every 15 minutes  dextrose 50% Injectable 25 milliLiter(s) IV Push every 15 minutes  EPINEPHrine    Infusion 0.051 MICROgram(s)/kG/Min IV Continuous <Continuous>  folic acid 1 milliGRAM(s) Oral daily  heparin   Injectable 5000 Unit(s) SubCutaneous every 8 hours  insulin regular Infusion 8 Unit(s)/Hr IV Continuous <Continuous>  levETIRAcetam 750 milliGRAM(s) Oral two times a day  norepinephrine Infusion 0.05 MICROgram(s)/kG/Min IV Continuous <Continuous>  pantoprazole  Injectable 40 milliGRAM(s) IV Push every 12 hours  Phoxillum Filtration BK 4 / 2.5 5000 milliLiter(s) CRRT <Continuous>  Phoxillum Filtration BK 4 / 2.5 5000 milliLiter(s) CRRT <Continuous>  Phoxillum Filtration BK 4 / 2.5 5000 milliLiter(s) CRRT <Continuous>  propofol Infusion 41.754 MICROgram(s)/kG/Min IV Continuous <Continuous>  sodium chloride 0.9%. 1000 milliLiter(s) IV Continuous <Continuous>  vancomycin  IVPB 1000 milliGRAM(s) IV Intermittent daily  vasopressin Infusion 0.067 Unit(s)/Min IV Continuous <Continuous>    PRN Inpatient Medications      REVIEW OF SYSTEMS  --------------------------------------------------------------------------------  Unable to obtain    VITALS/PHYSICAL EXAM  --------------------------------------------------------------------------------  T(C): 37.6 (11-15-20 @ 12:00), Max: 37.6 (11-15-20 @ 12:00)  HR: 79 (11-15-20 @ 12:00) (79 - 84)  BP: --  RR: 13 (11-15-20 @ 12:00) (0 - 29)  SpO2: 98% (11-15-20 @ 12:00) (96% - 100%)  Wt(kg): --        11-14-20 @ 07:01  -  11-15-20 @ 07:00  --------------------------------------------------------  IN: 2118 mL / OUT: 1145 mL / NET: 973 mL    11-15-20 @ 07:01  -  11-15-20 @ 12:17  --------------------------------------------------------  IN: 227.1 mL / OUT: 213 mL / NET: 14.1 mL      Physical Exam:  General: Appears well developed, well nourished, intubated on vent, sedation being changed to presedx with plan to wake him up  AV paced on telemetry (personally reviewed)  HEENT: Head: normocephalic, atraumatic  Eyes: Pupils equal and reactive  Neck: Supple, no carotid bruit, no JVD, no HJR  CARDIOVASCULAR: Normal S1 and S2, pros M1A2 ALLIE  LUNGS: diminished breath sounds at bases, occ rhonchi  ABDOMEN: Soft, nontender, non-distended, positive bowel sounds, no mass or bruit  EXTREMITIES: No edema, distal pulses WNL  SKIN: Warm and dry with normal turgor  NEURO: intubated and sedated on vent    LABS/STUDIES  --------------------------------------------------------------------------------              10.3   19.25 >-----------<  108      [11-15-20 @ 11:15]              30.8     144  |  107  |  51.0  ----------------------------<  118      [11-15-20 @ 11:15]  5.0   |  22.0  |  3.94        Ca     8.4     [11-15-20 @ 11:15]      Mg     2.8     [11-15-20 @ 02:40]      Phos  1.8     [11-13-20 @ 17:33]    TPro  5.1  /  Alb  3.2  /  TBili  1.3  /  DBili  x   /  AST  61  /  ALT  18  /  AlkPhos  35  [11-15-20 @ 02:40]    PT/INR: PT 10.5 , INR 0.90       [11-13-20 @ 17:33]  PTT: 35.1       [11-13-20 @ 17:33]      Iron 29, TIBC 332, %sat 9      [11-03-20 @ 08:18]  Ferritin 230      [11-03-20 @ 08:18]  TSH 1.95      [11-03-20 @ 08:18]  Lipid: chol --, , HDL --, LDL --      [11-15-20 @ 02:40]    HBsAb <3.0      [11-07-20 @ 05:41]  HBsAg Nonreact      [11-07-20 @ 05:41]  HBcAb Nonreact      [11-07-20 @ 05:41]  HCV 0.07, Nonreact      [11-07-20 @ 05:41]

## 2020-11-15 NOTE — PROGRESS NOTE ADULT - SUBJECTIVE AND OBJECTIVE BOX
[facility]                                                    [address], [address], [place] [address]                                                         PHONE: [phone]    FAX: [phone] [address], [address], [place] [address]                                                 PHONE: [phone]    FAX: [phone]  *******************************************************************************    Overnight events/Subjective Assessment:  stable hemodynamics  tapered off nitric oxide    penicillin (Unknown)    MEDICATIONS  (STANDING):  aspirin  chewable 324 milliGRAM(s) Oral daily  atorvastatin 40 milliGRAM(s) Oral at bedtime  chlorhexidine 0.12% Liquid 15 milliLiter(s) Oral Mucosa every 12 hours  chlorhexidine 2% Cloths 1 Application(s) Topical daily  CRRT Treatment    <Continuous>  dexMEDEtomidine Infusion 0.5 MICROgram(s)/kG/Hr (12.5 mL/Hr) IV Continuous <Continuous>  dextrose 50% Injectable 50 milliLiter(s) IV Push every 15 minutes  dextrose 50% Injectable 25 milliLiter(s) IV Push every 15 minutes  EPINEPHrine    Infusion 0.051 MICROgram(s)/kG/Min (19 mL/Hr) IV Continuous <Continuous>  folic acid 1 milliGRAM(s) Oral daily  heparin   Injectable 5000 Unit(s) SubCutaneous every 8 hours  insulin regular Infusion 8 Unit(s)/Hr (8 mL/Hr) IV Continuous <Continuous>  levETIRAcetam 750 milliGRAM(s) Oral two times a day  norepinephrine Infusion 0.05 MICROgram(s)/kG/Min (9.36 mL/Hr) IV Continuous <Continuous>  pantoprazole  Injectable 40 milliGRAM(s) IV Push every 12 hours  Phoxillum Filtration BK 4 / 2.5 5000 milliLiter(s) (1000 mL/Hr) CRRT <Continuous>  Phoxillum Filtration BK 4 / 2.5 5000 milliLiter(s) (500 mL/Hr) CRRT <Continuous>  Phoxillum Filtration BK 4 / 2.5 5000 milliLiter(s) (1500 mL/Hr) CRRT <Continuous>  propofol Infusion 41.754 MICROgram(s)/kG/Min (25 mL/Hr) IV Continuous <Continuous>  sodium chloride 0.9%. 1000 milliLiter(s) (10 mL/Hr) IV Continuous <Continuous>  vancomycin  IVPB 1000 milliGRAM(s) IV Intermittent daily  vasopressin Infusion 0.067 Unit(s)/Min (4 mL/Hr) IV Continuous <Continuous>    MEDICATIONS  (PRN):      Vital Signs Last 24 Hrs  T(C): 35.5 (15 Nov 2020 07:05), Max: 36 (14 Nov 2020 17:00)  T(F): 95.9 (15 Nov 2020 07:05), Max: 96.8 (14 Nov 2020 17:00)  HR: 79 (15 Nov 2020 09:30) (79 - 84)  BP: --  BP(mean): --  RR: 11 (15 Nov 2020 09:30) (0 - 29)  SpO2: 98% (15 Nov 2020 09:30) (96% - 100%)    I&O's Detail    14 Nov 2020 07:01  -  15 Nov 2020 07:00  --------------------------------------------------------  IN:    Albumin 5%  - 250 mL: 250 mL    Enteral Tube Flush: 100 mL    EPINEPHrine: 60 mL    EPINEPHrine: 60 mL    EPINEPHrine: 272 mL    EPINEPHrine: 60 mL    Insulin: 61 mL    IV PiggyBack: 150 mL    Milrinone: 12 mL    Norepinephrine: 145 mL    Norepinephrine: 46 mL    Propofol: 396 mL    sodium chloride 0.9%: 420 mL    Vasopressin: 74 mL    Vasopressin: 12 mL  Total IN: 2118 mL    OUT:    Chest Tube (mL): 30 mL    Chest Tube (mL): 160 mL    Chest Tube (mL): 200 mL    Chest Tube (mL): 390 mL    Indwelling Catheter - Urethral (mL): 365 mL  Total OUT: 1145 mL    Total NET: 973 mL      15 Nov 2020 07:01  -  15 Nov 2020 10:01  --------------------------------------------------------  IN:    Dexmedetomidine: 0.5 mL    EPINEPHrine: 36 mL    Insulin: 5 mL    Norepinephrine: 20 mL    Propofol: 36 mL    sodium chloride 0.9%: 20 mL    Vasopressin: 4 mL  Total IN: 121.5 mL    OUT:    Chest Tube (mL): 10 mL    Chest Tube (mL): 70 mL    Chest Tube (mL): 0 mL    Chest Tube (mL): 30 mL    Indwelling Catheter - Urethral (mL): 47 mL  Total OUT: 157 mL    Total NET: -35.5 mL        I&O's Summary    14 Nov 2020 07:01  -  15 Nov 2020 07:00  --------------------------------------------------------  IN: 2118 mL / OUT: 1145 mL / NET: 973 mL    15 Nov 2020 07:01  -  15 Nov 2020 10:01  --------------------------------------------------------  IN: 121.5 mL / OUT: 157 mL / NET: -35.5 mL        Mode: AC/ CMV (Assist Control/ Continuous Mandatory Ventilation), RR (machine): 10, TV (machine): 800, FiO2: 40, PEEP: 5, MAP: 12, PIP: 35    PHYSICAL EXAM:  General: Appears well developed, well nourished, intubated on vent, sedation being changed to presedx with plan to wake him up  AV paced on telemetry (personally reviewed)  HEENT: Head: normocephalic, atraumatic  Eyes: Pupils equal and reactive  Neck: Supple, no carotid bruit, no JVD, no HJR  CARDIOVASCULAR: Normal S1 and S2, pros M1A2 ALLIE  LUNGS: diminished breath sounds at bases, occ rhonchi  ABDOMEN: Soft, nontender, non-distended, positive bowel sounds, no mass or bruit  EXTREMITIES: No edema, distal pulses WNL  SKIN: Warm and dry with normal turgor  NEURO: intubated and sedated on vent          LABS:                        10.2   16.86 )-----------( 117      ( 15 Nov 2020 02:40 )             30.6     11-15    144  |  107  |  45.0<H>  ----------------------------<  134<H>  5.0   |  21.0<L>  |  3.60<H>    Ca    8.9      15 Nov 2020 02:40  Phos  1.8     11-13  Mg     2.8     11-15    TPro  5.1<L>  /  Alb  3.2<L>  /  TBili  1.3  /  DBili  x   /  AST  61<H>  /  ALT  18  /  AlkPhos  35<L>  11-15      PT/INR - ( 13 Nov 2020 17:33 )   PT: 10.5 sec;   INR: 0.90 ratio    PTT - ( 13 Nov 2020 17:33 )  PTT:35.1 sec  Thyroid Stimulating Hormone, Serum: 1.95 uIU/mL (11-03 @ 08:18)    TELEMETRY: AV paced @ 80 (perosnally reviewed)      ASSESSMENT AND PLAN:  In summary, JONATHAN GIBSON is a 60y Male with past medical history significant for HTN, HLD, CAD, ascending aortic dissection s/p repair (2001), chronic diastolic HFpEF, moderate valvular heart disease including moderate aortic and mitral regurgitation, admitted with CP/SOB/elevated troponin.  - Echocardiogram 11/4/20 EF 50-55%, grade II diastolic dysfunction, mildly reduced RV systolic function, severe LAE, mild KOLTON, mod-severe MR and AI, mod TR, dilated aortic root @ 4.2cm  - Cardiac cath 11/10/20: severe 2 vessel CAD.  Chronically occluded proximal RCA with collaterals from the LCA.  EF 50%.  Elevated LVEDP.  - s/p AVR/MVR/CABG x 2 with Dr Butler    Making slow and steady hemodynamic improvement; tapered off nitric oxide  Plan to lighten up on sedation and allow him to wake up  For CRRT today  Continue Keppra for seizures  Agree with CTS management plan (discussed with CTS team)    Tristian Lui MD

## 2020-11-15 NOTE — PROGRESS NOTE ADULT - ASSESSMENT
59 y/o M with PMH of CAD s/p stent in LAD, leaky aortic valve, uncontrolled bp, s/p aortic dissection repair 2010, CVA left sided residual weakness, s/p colostomy with reversal, presents from home with sob and LE edema. Echo indicating EF 55% moderate severe, MR, Moderate TR, mod-sever AI, grade II diastolic dysfunction. SANTOS/CKD receiving HD via right IJ permacath. Pt getting EGD and Coloscopy 11/9, 2/2 to anemia. Plan for Cath 11/10 with Dr. Debbie sterlinged by HD as per renal.     11/14 now POD1 reop C2V, AVR/MVR. Critically stable on multi-pressor and multi-inotrope support, s/p multiple blood products. Oliguric on HD, plan for next HD session in AM.     11/15 POD 2 - Continues to be critically stable on multi-pressor support. Pressor requirements decreasing. Milrinone discontinued. Remains on epinephrine. Ashtyn weaned to 8.

## 2020-11-15 NOTE — PROGRESS NOTE ADULT - PROBLEM SELECTOR PLAN 1
Remains intubated and sedated. S/p multiple blood products including 3 PRBC post-operatively.     [place]-rodrigo catheter in place, monitoring Cardiac index hourly and continuous SVo2. Index stable >2 with inotropic support. Decreased RV function - epinephrine, inhaled NO. Fluid responsive. Lactate downtrended to normal.     Currently AV paced at 80, underlying junctional in 50s.    Plan to wean sedation later in AM and possibly extubate. Wean Inhaled NO as tolerated. Wean pressors and inotropes as tolerated.     Facial edema s/p multiple blood products in OR. Likely secondary to volume overload. Resolved significantly. Decadron Q6 for presumed airway edema completed x3 doses.

## 2020-11-15 NOTE — PROGRESS NOTE ADULT - ASSESSMENT
1) Martin -Started on HD before cardiac surgery for optimization  2) chronic diastolic HFpEF, moderate valvular heart disease including moderate aortic and mitral regurgitation  3) Anemia of renal disease  4) CAD; elevated LVEDP    CVVHDF reevaluated;  25cc net ultrafiltration hourly;  Continue retacrit; anemia below goal      dw CTS PA

## 2020-11-15 NOTE — PROGRESS NOTE ADULT - SUBJECTIVE AND OBJECTIVE BOX
Patient intubated and sedated. Unable to participate in HPI or ROS    Brief summary:  60yMale POD# 2 AVR/MVR, C2V re-op    Overnight events: none      PAST MEDICAL & SURGICAL HISTORY:  CVA (cerebral vascular accident)    CHF (congestive heart failure)    H/O colectomy    Aorta disorder          aspirin  chewable 324 milliGRAM(s) Oral daily  atorvastatin 40 milliGRAM(s) Oral at bedtime  cefuroxime  IVPB 1500 milliGRAM(s) IV Intermittent every 8 hours  chlorhexidine 0.12% Liquid 15 milliLiter(s) Oral Mucosa every 12 hours  chlorhexidine 2% Cloths 1 Application(s) Topical daily  dextrose 50% Injectable 50 milliLiter(s) IV Push every 15 minutes  dextrose 50% Injectable 25 milliLiter(s) IV Push every 15 minutes  EPINEPHrine    Infusion 0.051 MICROgram(s)/kG/Min IV Continuous <Continuous>  folic acid 1 milliGRAM(s) Oral daily  heparin   Injectable 5000 Unit(s) SubCutaneous every 8 hours  insulin regular Infusion 8 Unit(s)/Hr IV Continuous <Continuous>  levETIRAcetam 750 milliGRAM(s) Oral two times a day  norepinephrine Infusion 0.05 MICROgram(s)/kG/Min IV Continuous <Continuous>  pantoprazole  Injectable 40 milliGRAM(s) IV Push every 12 hours  propofol Infusion 41.754 MICROgram(s)/kG/Min IV Continuous <Continuous>  sodium chloride 0.9%. 1000 milliLiter(s) IV Continuous <Continuous>  vancomycin  IVPB 1000 milliGRAM(s) IV Intermittent daily  vasopressin Infusion 0.067 Unit(s)/Min IV Continuous <Continuous>  MEDICATIONS  (PRN):    Mode: AC/ CMV (Assist Control/ Continuous Mandatory Ventilation), RR (machine): 10, TV (machine): 800, FiO2: 40, PEEP: 5, MAP: 11, PIP: 35  Daily     Daily       ABG - ( 14 Nov 2020 21:49 )  pH, Arterial: 7.38  pH, Blood: x     /  pCO2: 41    /  pO2: 94    / HCO3: 24    / Base Excess: -0.4  /  SaO2: 98                                      10.2   16.11 )-----------( 128      ( 14 Nov 2020 12:51 )             30.4   11-14    145  |  108<H>  |  37.0<H>  ----------------------------<  107<H>  4.7   |  24.0  |  3.11<H>    Ca    9.0      14 Nov 2020 12:51  Phos  1.8     11-13  Mg     2.8     11-14    TPro  5.1<L>  /  Alb  3.3  /  TBili  2.0  /  DBili  x   /  AST  81<H>  /  ALT  19  /  AlkPhos  33<L>  11-14      PT/INR - ( 13 Nov 2020 17:33 )   PT: 10.5 sec;   INR: 0.90 ratio         PTT - ( 13 Nov 2020 17:33 )  PTT:35.1 sec      Objective:  T(C): 36 (11-15-20 @ 00:00), Max: 36.6 (11-14-20 @ 05:00)  HR: 80 (11-15-20 @ 01:30) (80 - 84)  BP: --  RR: 10 (11-15-20 @ 01:30) (0 - 16)  SpO2: 97% (11-15-20 @ 01:30) (96% - 100%)  Wt(kg): --CAPILLARY BLOOD GLUCOSE      POCT Blood Glucose.: 128 mg/dL (14 Nov 2020 22:56)  POCT Blood Glucose.: 127 mg/dL (14 Nov 2020 21:46)  POCT Blood Glucose.: 117 mg/dL (14 Nov 2020 18:06)  POCT Blood Glucose.: 125 mg/dL (14 Nov 2020 16:26)  POCT Blood Glucose.: 110 mg/dL (14 Nov 2020 14:10)  POCT Blood Glucose.: 106 mg/dL (14 Nov 2020 12:13)  POCT Blood Glucose.: 104 mg/dL (14 Nov 2020 11:08)  POCT Blood Glucose.: 103 mg/dL (14 Nov 2020 10:05)  POCT Blood Glucose.: 102 mg/dL (14 Nov 2020 09:03)  POCT Blood Glucose.: 105 mg/dL (14 Nov 2020 08:02)  POCT Blood Glucose.: 118 mg/dL (14 Nov 2020 05:48)  POCT Blood Glucose.: 128 mg/dL (14 Nov 2020 03:46)  POCT Blood Glucose.: 143 mg/dL (14 Nov 2020 01:50)  I&O's Summary    13 Nov 2020 07:01 - 14 Nov 2020 07:00  --------------------------------------------------------  IN: 5619 mL / OUT: 1375 mL / NET: 4244 mL    14 Nov 2020 07:01  -  15 Nov 2020 01:47  --------------------------------------------------------  IN: 1284.5 mL / OUT: 830 mL / NET: 454.5 mL        Physical Exam  Neuro: Intubated and sedated. MAEx4 off sedation.   Pulm: CTA, equal bilaterally  CV: RRR, +S1S2, no m/r/g  Abd: soft, NT, ND, +BS  Ext: +DP Pulses b/l,  no edema  Inc: MSI C/D/I/stable w/ dressing, b/l LE C/D/I with Ace wrap  Chest tubes: Mediastinal and pleural chest tubes in place, draining appropriately to suction. site c/d/i with dressing.    Imaging:  CXR: < from: Xray Chest 1 View- PORTABLE-Routine (11.14.20 @ 06:08) >  INTERPRETATION:  Clinical Information: Open heart surgery. Central line placement.    Technique: 4 AP chest image obtained on 11/13/2020 and 1 AP chest image obtained on 11/14/2020.    Comparison: None    Findings/  Impression: On the most recent image, the ET tip is above the agus. The NG tube below the diaphragm. The right IJ catheter tip is at the at the SVC. The left IJ catheter tip is ultimately within theright pulmonary artery lower lobar branch. There are multiple chest tubes and mediastinal drains.    Status post mitral valve and aortic valve replacement. Status post CABG. Cardiomegaly. Lungs are clear. No pneumothorax. No pleural effusions.            YVONNE AZUL MD; Attending Interventional Radiologist  This document has been electronically signed. Nov 14 2020  8:37AM    < end of copied text >        ECG:

## 2020-11-16 LAB
ACANTHOCYTES BLD QL SMEAR: SLIGHT — SIGNIFICANT CHANGE UP
ALBUMIN SERPL ELPH-MCNC: 3.2 G/DL — LOW (ref 3.3–5.2)
ALBUMIN SERPL ELPH-MCNC: 3.4 G/DL — SIGNIFICANT CHANGE UP (ref 3.3–5.2)
ALP SERPL-CCNC: 37 U/L — LOW (ref 40–120)
ALP SERPL-CCNC: 43 U/L — SIGNIFICANT CHANGE UP (ref 40–120)
ALP SERPL-CCNC: 49 U/L — SIGNIFICANT CHANGE UP (ref 40–120)
ALT FLD-CCNC: 18 U/L — SIGNIFICANT CHANGE UP
ALT FLD-CCNC: 19 U/L — SIGNIFICANT CHANGE UP
ALT FLD-CCNC: 20 U/L — SIGNIFICANT CHANGE UP
ANION GAP SERPL CALC-SCNC: 13 MMOL/L — SIGNIFICANT CHANGE UP (ref 5–17)
ANION GAP SERPL CALC-SCNC: 14 MMOL/L — SIGNIFICANT CHANGE UP (ref 5–17)
ANION GAP SERPL CALC-SCNC: 16 MMOL/L — SIGNIFICANT CHANGE UP (ref 5–17)
ANION GAP SERPL CALC-SCNC: 17 MMOL/L — SIGNIFICANT CHANGE UP (ref 5–17)
ANISOCYTOSIS BLD QL: SLIGHT — SIGNIFICANT CHANGE UP
APTT BLD: 29.5 SEC — SIGNIFICANT CHANGE UP (ref 27.5–35.5)
AST SERPL-CCNC: 35 U/L — SIGNIFICANT CHANGE UP
AST SERPL-CCNC: 37 U/L — SIGNIFICANT CHANGE UP
AST SERPL-CCNC: 41 U/L — HIGH
BASE EXCESS BLDV CALC-SCNC: -0.7 MMOL/L — SIGNIFICANT CHANGE UP (ref -2–2)
BASOPHILS # BLD AUTO: 0 K/UL — SIGNIFICANT CHANGE UP (ref 0–0.2)
BASOPHILS # BLD AUTO: 0.01 K/UL — SIGNIFICANT CHANGE UP (ref 0–0.2)
BASOPHILS NFR BLD AUTO: 0 % — SIGNIFICANT CHANGE UP (ref 0–2)
BASOPHILS NFR BLD AUTO: 0.1 % — SIGNIFICANT CHANGE UP (ref 0–2)
BILIRUB SERPL-MCNC: 1.4 MG/DL — SIGNIFICANT CHANGE UP (ref 0.4–2)
BILIRUB SERPL-MCNC: 1.6 MG/DL — SIGNIFICANT CHANGE UP (ref 0.4–2)
BILIRUB SERPL-MCNC: 1.7 MG/DL — SIGNIFICANT CHANGE UP (ref 0.4–2)
BUN SERPL-MCNC: 29 MG/DL — HIGH (ref 8–20)
BUN SERPL-MCNC: 34 MG/DL — HIGH (ref 8–20)
BUN SERPL-MCNC: 37 MG/DL — HIGH (ref 8–20)
BUN SERPL-MCNC: 41 MG/DL — HIGH (ref 8–20)
BURR CELLS BLD QL SMEAR: PRESENT — SIGNIFICANT CHANGE UP
CALCIUM SERPL-MCNC: 7.5 MG/DL — LOW (ref 8.6–10.2)
CALCIUM SERPL-MCNC: 7.9 MG/DL — LOW (ref 8.6–10.2)
CALCIUM SERPL-MCNC: 7.9 MG/DL — LOW (ref 8.6–10.2)
CALCIUM SERPL-MCNC: 8.3 MG/DL — LOW (ref 8.6–10.2)
CHLORIDE SERPL-SCNC: 103 MMOL/L — SIGNIFICANT CHANGE UP (ref 98–107)
CHLORIDE SERPL-SCNC: 104 MMOL/L — SIGNIFICANT CHANGE UP (ref 98–107)
CHLORIDE SERPL-SCNC: 105 MMOL/L — SIGNIFICANT CHANGE UP (ref 98–107)
CHLORIDE SERPL-SCNC: 105 MMOL/L — SIGNIFICANT CHANGE UP (ref 98–107)
CO2 SERPL-SCNC: 19 MMOL/L — LOW (ref 22–29)
CO2 SERPL-SCNC: 20 MMOL/L — LOW (ref 22–29)
CO2 SERPL-SCNC: 21 MMOL/L — LOW (ref 22–29)
CO2 SERPL-SCNC: 21 MMOL/L — LOW (ref 22–29)
CREAT SERPL-MCNC: 1.6 MG/DL — HIGH (ref 0.5–1.3)
CREAT SERPL-MCNC: 2.14 MG/DL — HIGH (ref 0.5–1.3)
CREAT SERPL-MCNC: 2.33 MG/DL — HIGH (ref 0.5–1.3)
CREAT SERPL-MCNC: 2.66 MG/DL — HIGH (ref 0.5–1.3)
ELLIPTOCYTES BLD QL SMEAR: SLIGHT — SIGNIFICANT CHANGE UP
EOSINOPHIL # BLD AUTO: 0 K/UL — SIGNIFICANT CHANGE UP (ref 0–0.5)
EOSINOPHIL # BLD AUTO: 0.01 K/UL — SIGNIFICANT CHANGE UP (ref 0–0.5)
EOSINOPHIL NFR BLD AUTO: 0 % — SIGNIFICANT CHANGE UP (ref 0–6)
EOSINOPHIL NFR BLD AUTO: 0.1 % — SIGNIFICANT CHANGE UP (ref 0–6)
GAS PNL BLDA: SIGNIFICANT CHANGE UP
GAS PNL BLDV: SIGNIFICANT CHANGE UP
GLUCOSE BLDC GLUCOMTR-MCNC: 102 MG/DL — HIGH (ref 70–99)
GLUCOSE BLDC GLUCOMTR-MCNC: 109 MG/DL — HIGH (ref 70–99)
GLUCOSE BLDC GLUCOMTR-MCNC: 96 MG/DL — SIGNIFICANT CHANGE UP (ref 70–99)
GLUCOSE BLDC GLUCOMTR-MCNC: 99 MG/DL — SIGNIFICANT CHANGE UP (ref 70–99)
GLUCOSE SERPL-MCNC: 100 MG/DL — HIGH (ref 70–99)
GLUCOSE SERPL-MCNC: 109 MG/DL — HIGH (ref 70–99)
GLUCOSE SERPL-MCNC: 114 MG/DL — HIGH (ref 70–99)
GLUCOSE SERPL-MCNC: 93 MG/DL — SIGNIFICANT CHANGE UP (ref 70–99)
HCO3 BLDV-SCNC: 23 MMOL/L — SIGNIFICANT CHANGE UP (ref 21–29)
HCT VFR BLD CALC: 30.9 % — LOW (ref 39–50)
HCT VFR BLD CALC: 31.4 % — LOW (ref 39–50)
HGB BLD-MCNC: 10 G/DL — LOW (ref 13–17)
HGB BLD-MCNC: 10.1 G/DL — LOW (ref 13–17)
IMM GRANULOCYTES NFR BLD AUTO: 0.6 % — SIGNIFICANT CHANGE UP (ref 0–1.5)
LYMPHOCYTES # BLD AUTO: 0.31 K/UL — LOW (ref 1–3.3)
LYMPHOCYTES # BLD AUTO: 1.16 K/UL — SIGNIFICANT CHANGE UP (ref 1–3.3)
LYMPHOCYTES # BLD AUTO: 1.7 % — LOW (ref 13–44)
LYMPHOCYTES # BLD AUTO: 6.8 % — LOW (ref 13–44)
MAGNESIUM SERPL-MCNC: 2.5 MG/DL — SIGNIFICANT CHANGE UP (ref 1.6–2.6)
MAGNESIUM SERPL-MCNC: 2.5 MG/DL — SIGNIFICANT CHANGE UP (ref 1.6–2.6)
MAGNESIUM SERPL-MCNC: 2.6 MG/DL — SIGNIFICANT CHANGE UP (ref 1.8–2.6)
MANUAL SMEAR VERIFICATION: SIGNIFICANT CHANGE UP
MCHC RBC-ENTMCNC: 30.2 PG — SIGNIFICANT CHANGE UP (ref 27–34)
MCHC RBC-ENTMCNC: 30.6 PG — SIGNIFICANT CHANGE UP (ref 27–34)
MCHC RBC-ENTMCNC: 32.2 GM/DL — SIGNIFICANT CHANGE UP (ref 32–36)
MCHC RBC-ENTMCNC: 32.4 GM/DL — SIGNIFICANT CHANGE UP (ref 32–36)
MCV RBC AUTO: 94 FL — SIGNIFICANT CHANGE UP (ref 80–100)
MCV RBC AUTO: 94.5 FL — SIGNIFICANT CHANGE UP (ref 80–100)
MICROCYTES BLD QL: SLIGHT — SIGNIFICANT CHANGE UP
MONOCYTES # BLD AUTO: 0.96 K/UL — HIGH (ref 0–0.9)
MONOCYTES # BLD AUTO: 1.36 K/UL — HIGH (ref 0–0.9)
MONOCYTES NFR BLD AUTO: 5.3 % — SIGNIFICANT CHANGE UP (ref 2–14)
MONOCYTES NFR BLD AUTO: 7.9 % — SIGNIFICANT CHANGE UP (ref 2–14)
NEUTROPHILS # BLD AUTO: 14.52 K/UL — HIGH (ref 1.8–7.4)
NEUTROPHILS # BLD AUTO: 16.8 K/UL — HIGH (ref 1.8–7.4)
NEUTROPHILS NFR BLD AUTO: 84.5 % — HIGH (ref 43–77)
NEUTROPHILS NFR BLD AUTO: 92.1 % — HIGH (ref 43–77)
NEUTS BAND # BLD: 0.9 % — SIGNIFICANT CHANGE UP (ref 0–8)
OVALOCYTES BLD QL SMEAR: SLIGHT — SIGNIFICANT CHANGE UP
PCO2 BLDV: 47 MMHG — SIGNIFICANT CHANGE UP (ref 35–50)
PH BLDV: 7.34 — SIGNIFICANT CHANGE UP (ref 7.32–7.43)
PHOSPHATE SERPL-MCNC: 7 MG/DL — HIGH (ref 2.4–4.7)
PLAT MORPH BLD: NORMAL — SIGNIFICANT CHANGE UP
PLATELET # BLD AUTO: 73 K/UL — LOW (ref 150–400)
PLATELET # BLD AUTO: 85 K/UL — LOW (ref 150–400)
PO2 BLDV: 34 MMHG — SIGNIFICANT CHANGE UP (ref 25–45)
POIKILOCYTOSIS BLD QL AUTO: SLIGHT — SIGNIFICANT CHANGE UP
POLYCHROMASIA BLD QL SMEAR: SLIGHT — SIGNIFICANT CHANGE UP
POTASSIUM SERPL-MCNC: 4.8 MMOL/L — SIGNIFICANT CHANGE UP (ref 3.5–5.3)
POTASSIUM SERPL-MCNC: 4.8 MMOL/L — SIGNIFICANT CHANGE UP (ref 3.5–5.3)
POTASSIUM SERPL-MCNC: 4.9 MMOL/L — SIGNIFICANT CHANGE UP (ref 3.5–5.3)
POTASSIUM SERPL-MCNC: 4.9 MMOL/L — SIGNIFICANT CHANGE UP (ref 3.5–5.3)
POTASSIUM SERPL-SCNC: 4.8 MMOL/L — SIGNIFICANT CHANGE UP (ref 3.5–5.3)
POTASSIUM SERPL-SCNC: 4.8 MMOL/L — SIGNIFICANT CHANGE UP (ref 3.5–5.3)
POTASSIUM SERPL-SCNC: 4.9 MMOL/L — SIGNIFICANT CHANGE UP (ref 3.5–5.3)
POTASSIUM SERPL-SCNC: 4.9 MMOL/L — SIGNIFICANT CHANGE UP (ref 3.5–5.3)
PROT SERPL-MCNC: 5.1 G/DL — LOW (ref 6.6–8.7)
PROT SERPL-MCNC: 5.3 G/DL — LOW (ref 6.6–8.7)
PROT SERPL-MCNC: 5.5 G/DL — LOW (ref 6.6–8.7)
RBC # BLD: 3.27 M/UL — LOW (ref 4.2–5.8)
RBC # BLD: 3.34 M/UL — LOW (ref 4.2–5.8)
RBC # FLD: 15.2 % — HIGH (ref 10.3–14.5)
RBC # FLD: 15.8 % — HIGH (ref 10.3–14.5)
RBC BLD AUTO: ABNORMAL
SAO2 % BLDV: 57 % — SIGNIFICANT CHANGE UP
SMUDGE CELLS # BLD: PRESENT — SIGNIFICANT CHANGE UP
SODIUM SERPL-SCNC: 138 MMOL/L — SIGNIFICANT CHANGE UP (ref 135–145)
SODIUM SERPL-SCNC: 139 MMOL/L — SIGNIFICANT CHANGE UP (ref 135–145)
SODIUM SERPL-SCNC: 140 MMOL/L — SIGNIFICANT CHANGE UP (ref 135–145)
SODIUM SERPL-SCNC: 141 MMOL/L — SIGNIFICANT CHANGE UP (ref 135–145)
WBC # BLD: 16.76 K/UL — HIGH (ref 3.8–10.5)
WBC # BLD: 17.17 K/UL — HIGH (ref 3.8–10.5)
WBC # FLD AUTO: 16.76 K/UL — HIGH (ref 3.8–10.5)
WBC # FLD AUTO: 17.17 K/UL — HIGH (ref 3.8–10.5)

## 2020-11-16 PROCEDURE — 71045 X-RAY EXAM CHEST 1 VIEW: CPT | Mod: 26

## 2020-11-16 PROCEDURE — 90937 HEMODIALYSIS REPEATED EVAL: CPT

## 2020-11-16 RX ORDER — BENZOCAINE AND MENTHOL 5; 1 G/100ML; G/100ML
1 LIQUID ORAL THREE TIMES A DAY
Refills: 0 | Status: DISCONTINUED | OUTPATIENT
Start: 2020-11-16 | End: 2020-11-30

## 2020-11-16 RX ORDER — MILRINONE LACTATE 1 MG/ML
0.25 INJECTION, SOLUTION INTRAVENOUS
Qty: 20 | Refills: 0 | Status: DISCONTINUED | OUTPATIENT
Start: 2020-11-16 | End: 2020-11-19

## 2020-11-16 RX ORDER — WARFARIN SODIUM 2.5 MG/1
5 TABLET ORAL ONCE
Refills: 0 | Status: COMPLETED | OUTPATIENT
Start: 2020-11-16 | End: 2020-11-16

## 2020-11-16 RX ORDER — ASPIRIN/CALCIUM CARB/MAGNESIUM 324 MG
81 TABLET ORAL DAILY
Refills: 0 | Status: DISCONTINUED | OUTPATIENT
Start: 2020-11-16 | End: 2020-11-30

## 2020-11-16 RX ORDER — ACETAMINOPHEN 500 MG
1000 TABLET ORAL ONCE
Refills: 0 | Status: COMPLETED | OUTPATIENT
Start: 2020-11-16 | End: 2020-11-16

## 2020-11-16 RX ORDER — LANOLIN ALCOHOL/MO/W.PET/CERES
5 CREAM (GRAM) TOPICAL AT BEDTIME
Refills: 0 | Status: DISCONTINUED | OUTPATIENT
Start: 2020-11-16 | End: 2020-11-30

## 2020-11-16 RX ADMIN — WARFARIN SODIUM 5 MILLIGRAM(S): 2.5 TABLET ORAL at 21:19

## 2020-11-16 RX ADMIN — FENTANYL CITRATE 50 MICROGRAM(S): 50 INJECTION INTRAVENOUS at 22:40

## 2020-11-16 RX ADMIN — PANTOPRAZOLE SODIUM 40 MILLIGRAM(S): 20 TABLET, DELAYED RELEASE ORAL at 05:06

## 2020-11-16 RX ADMIN — Medication 1000 MILLIGRAM(S): at 11:15

## 2020-11-16 RX ADMIN — HEPARIN SODIUM 5000 UNIT(S): 5000 INJECTION INTRAVENOUS; SUBCUTANEOUS at 05:06

## 2020-11-16 RX ADMIN — FENTANYL CITRATE 50 MICROGRAM(S): 50 INJECTION INTRAVENOUS at 02:28

## 2020-11-16 RX ADMIN — CHLORHEXIDINE GLUCONATE 15 MILLILITER(S): 213 SOLUTION TOPICAL at 05:07

## 2020-11-16 RX ADMIN — LEVETIRACETAM 750 MILLIGRAM(S): 250 TABLET, FILM COATED ORAL at 05:06

## 2020-11-16 RX ADMIN — FENTANYL CITRATE 50 MICROGRAM(S): 50 INJECTION INTRAVENOUS at 02:00

## 2020-11-16 RX ADMIN — PANTOPRAZOLE SODIUM 40 MILLIGRAM(S): 20 TABLET, DELAYED RELEASE ORAL at 18:18

## 2020-11-16 RX ADMIN — LEVETIRACETAM 750 MILLIGRAM(S): 250 TABLET, FILM COATED ORAL at 18:17

## 2020-11-16 RX ADMIN — Medication 400 MILLIGRAM(S): at 11:00

## 2020-11-16 RX ADMIN — CHLORHEXIDINE GLUCONATE 1 APPLICATION(S): 213 SOLUTION TOPICAL at 12:00

## 2020-11-16 RX ADMIN — FENTANYL CITRATE 50 MICROGRAM(S): 50 INJECTION INTRAVENOUS at 22:55

## 2020-11-16 RX ADMIN — Medication 81 MILLIGRAM(S): at 14:37

## 2020-11-16 RX ADMIN — Medication 1 MILLIGRAM(S): at 14:33

## 2020-11-16 RX ADMIN — Medication 250 MILLIGRAM(S): at 21:19

## 2020-11-16 RX ADMIN — ATORVASTATIN CALCIUM 40 MILLIGRAM(S): 80 TABLET, FILM COATED ORAL at 21:20

## 2020-11-16 RX ADMIN — Medication 5 MILLIGRAM(S): at 21:42

## 2020-11-16 NOTE — PROGRESS NOTE ADULT - SUBJECTIVE AND OBJECTIVE BOX
[facility], [facility]                                          [address] , [address], [place] [address]                                               PHONE: [phone]    FAX: [phone] [address], [address], [place] [address]                                       PHONE: [phone]    FAX: [phone]  *******************************************************************************    Overnight events/Subjective Assessment: Extubated this AM, awake, remains on Epi.  No specific cardiac complaints.    INTERPRETATION OF TELEMETRY (personally reviewed):  AV pacing, underlying rhythm is junctional in the 40s.    penicillin (Unknown)    MEDICATIONS  (STANDING):  aspirin enteric coated 81 milliGRAM(s) Oral daily  atorvastatin 40 milliGRAM(s) Oral at bedtime  chlorhexidine 0.12% Liquid 15 milliLiter(s) Oral Mucosa every 12 hours  chlorhexidine 2% Cloths 1 Application(s) Topical daily  CRRT Treatment    <Continuous>  dexMEDEtomidine Infusion 0.5 MICROgram(s)/kG/Hr (12.5 mL/Hr) IV Continuous <Continuous>  dextrose 50% Injectable 50 milliLiter(s) IV Push every 15 minutes  dextrose 50% Injectable 25 milliLiter(s) IV Push every 15 minutes  EPINEPHrine    Infusion 0.051 MICROgram(s)/kG/Min (19 mL/Hr) IV Continuous <Continuous>  folic acid 1 milliGRAM(s) Oral daily  heparin   Injectable 5000 Unit(s) SubCutaneous every 8 hours  insulin regular Infusion 8 Unit(s)/Hr (8 mL/Hr) IV Continuous <Continuous>  levETIRAcetam 750 milliGRAM(s) Oral two times a day  norepinephrine Infusion 0.05 MICROgram(s)/kG/Min (9.36 mL/Hr) IV Continuous <Continuous>  pantoprazole  Injectable 40 milliGRAM(s) IV Push every 12 hours  Phoxillum Filtration BK 4 / 2.5 5000 milliLiter(s) (1000 mL/Hr) CRRT <Continuous>  Phoxillum Filtration BK 4 / 2.5 5000 milliLiter(s) (1500 mL/Hr) CRRT <Continuous>  Phoxillum Filtration BK 4 / 2.5 5000 milliLiter(s) (1500 mL/Hr) CRRT <Continuous>  propofol Infusion 41.754 MICROgram(s)/kG/Min (25 mL/Hr) IV Continuous <Continuous>  sodium chloride 0.9%. 1000 milliLiter(s) (10 mL/Hr) IV Continuous <Continuous>  vancomycin  IVPB 1000 milliGRAM(s) IV Intermittent <User Schedule>  vasopressin Infusion 0.067 Unit(s)/Min (4 mL/Hr) IV Continuous <Continuous>  warfarin 5 milliGRAM(s) Oral once    MEDICATIONS  (PRN):  fentaNYL    Injectable 50 MICROGram(s) IV Push every 3 hours PRN Severe Pain (7 - 10)      Vital Signs Last 24 Hrs  T(C): 35.3 (16 Nov 2020 07:15), Max: 37.6 (15 Nov 2020 12:00)  T(F): 95.5 (16 Nov 2020 07:15), Max: 99.7 (15 Nov 2020 12:00)  HR: 79 (16 Nov 2020 08:30) (60 - 80)  BP: --  BP(mean): --  RR: 18 (16 Nov 2020 08:30) (7 - 18)  SpO2: 100% (16 Nov 2020 08:30) (96% - 100%)    I&O's Detail    15 Nov 2020 07:01  -  16 Nov 2020 07:00  --------------------------------------------------------  IN:    Dexmedetomidine: 188.3 mL    Enteral Tube Flush: 190 mL    EPINEPHrine: 297 mL    Insulin: 14.5 mL    IV PiggyBack: 250 mL    IV PiggyBack: 100 mL    Norepinephrine: 54 mL    Propofol: 266.7 mL    sodium chloride 0.9%: 380 mL    Vasopressin: 22.6 mL  Total IN: 1763.1 mL    OUT:    Chest Tube (mL): 40 mL    Chest Tube (mL): 30 mL    Chest Tube (mL): 10 mL    Chest Tube (mL): 330 mL    Indwelling Catheter - Urethral (mL): 370 mL    Nasogastric/Oral tube (mL): 150 mL    Other (mL): 1447 mL  Total OUT: 2377 mL    Total NET: -613.9 mL      16 Nov 2020 07:01  -  16 Nov 2020 10:26  --------------------------------------------------------  IN:    EPINEPHrine: 19 mL    sodium chloride 0.9%: 90 mL  Total IN: 109 mL    OUT:    Chest Tube (mL): 0 mL    Chest Tube (mL): 0 mL    Chest Tube (mL): 30 mL    Dexmedetomidine: 0 mL    Indwelling Catheter - Urethral (mL): 40 mL    Insulin: 0 mL    Norepinephrine: 0 mL    Other (mL): 93 mL    Propofol: 0 mL    Vasopressin: 0 mL  Total OUT: 163 mL    Total NET: -54 mL        I&O's Summary    15 Nov 2020 07:01  -  16 Nov 2020 07:00  --------------------------------------------------------  IN: 1763.1 mL / OUT: 2377 mL / NET: -613.9 mL    16 Nov 2020 07:01  -  16 Nov 2020 10:26  --------------------------------------------------------  IN: 109 mL / OUT: 163 mL / NET: -54 mL        PHYSICAL EXAM:  General: Appears well developed, well nourished, no acute distress  HEENT: Head: normocephalic, atraumatic, neck lines in place  Eyes: Pupils equal and reactive  Neck: Supple, no carotid bruit, no JVD,  CARDIOVASCULAR: Normal S1 and S2, 1/6 ALLIE  CHEST: clean sternotomy dressing  LUNGS: Decreased at bases, otherwise clear to auscultation bilaterally, no rales, rhonchi or wheeze  ABDOMEN: Soft, nontender, non-distended, positive bowel sounds, no mass or bruit  EXTREMITIES: diffuse edema, distal pulses WNL  SKIN: Warm and dry with normal turgor  NEURO: Alert & oriented x 3, grossly intact, lethargic  PSYCH: seemingly normal mood and affect        LABS:                        10.1   17.17 )-----------( 85       ( 16 Nov 2020 03:49 )             31.4     11-16    140  |  104  |  37.0<H>  ----------------------------<  109<H>  4.9   |  19.0<L>  |  2.33<H>    Ca    8.3<L>      16 Nov 2020 03:49  Mg     2.5     11-16    TPro  5.1<L>  /  Alb  3.2<L>  /  TBili  1.4  /  DBili  x   /  AST  37  /  ALT  18  /  AlkPhos  43  11-16        PT/INR - ( 15 Nov 2020 21:14 )   PT: 14.3 sec;   INR: 1.25 ratio         PTT - ( 15 Nov 2020 23:38 )  PTT:29.5 sec  serum  Lipids:     Thyroid Stimulating Hormone, Serum: 1.95 uIU/mL (11-03 @ 08:18)      ASSESSMENT AND PLAN:  In summary, JONATHAN GIBSON is a 60y Male with past medical history significant for HTN, HLD, CAD, ascending aortic dissection s/p repair (2001), chronic diastolic HFpEF, moderate valvular heart disease including moderate aortic and mitral regurgitation, admitted with CP/SOB/elevated troponin.  - Echocardiogram 11/4/20 EF 50-55%, grade II diastolic dysfunction, mildly reduced RV systolic function, severe LAE, mild KOLTON, mod-severe MR and AI, mod TR, dilated aortic root @ 4.2cm  - Cardiac cath 11/10/20: severe 2 vessel CAD.  Chronically occluded proximal RCA with collaterals from the LCA.  EF 50%.  Elevated LVEDP.  - s/p reop AVR 25mm CE [id number], MVR 31mm Magna Ease, CABGx2 (DGV-OM, RPDA) with Dr. Butler 11/13/20  - Now off of nitric oxide and extubated  - Wean Epi as possible  - AV pacing on telemetry with underlying junctional rhythm in 40s.  Will monitor for recovery, by may need PPM prior to discharge  - CVVH in place.  Continue for fluid removal  - Continue ASA 81 and Lipitor 40.  - AC as per CTS  - Glycemic control as per CTS  - Pain management as per CTS  - Tube and line management as per CTS  - Continue Keppra for seizures  - d/w CTICU PA and RN      Celso Stockton MD

## 2020-11-16 NOTE — AIRWAY REMOVAL NOTE  ADULT & PEDS - ARTIFICAL AIRWAY REMOVAL COMMENTS
Anesthesia at bedside during extubation.  Patient suctioned orally and endotracheally pre extubation and orally post.  Voice intact.  Tolerated well.

## 2020-11-16 NOTE — PROGRESS NOTE ADULT - SUBJECTIVE AND OBJECTIVE BOX
SUBJECTIVE   unable to obtain secondary  to patients status     INTERIM HISTORY SIGNIFICANT FOR   patient weaned off levophed, and vasopressin   patient currently maintained on epi, prop with plan to transition to precedex     Patient is a 60y old  Male who presents with a chief complaint of sob (15 Nov 2020 12:17)    HPI:  Patient is a 60 yoM with PMH of CAD s/p stent in LAD, leaky aortic valve, uncontrolled bp, thoracic aortic dissection requiring emergent surgery, CVA with left sided sensory loss, colostomy for bowel ischemia s/p reversal, psoriasis on Humira injections presents from home with sob and le edema. Patient states the past several weeks, he has not been taking his medications because he forgot. Patient states the sob has gotten worse over the past few days and also associated with chest pressure. Patient also hasnt seen his cardiologist in several months. Patient worked up in the er and had an elevated bnp and htn urgency and eric. Patient seen at bedside and states feeling a little better.  (02 Nov 2020 14:32)    OBJECTIVE  PAST MEDICAL & SURGICAL HISTORY:  CVA (cerebral vascular accident)    CHF (congestive heart failure)    H/O colectomy    Aorta disorder      penicillin (Unknown)    Home Medications:  amLODIPine 5 mg oral tablet: 1 tab(s) orally once a day (15 Oct 2019 09:55)  aspirin 81 mg oral delayed release tablet: 1 tab(s) orally once a day (15 Oct 2019 09:55)  atenolol 50 mg oral tablet: 1 tab(s) orally once a day (15 Oct 2019 09:55)  folic acid 1 mg oral tablet: 1 tab(s) orally once a day (15 Oct 2019 09:55)  Humira 40 mg/0.8 mL subcutaneous kit: every other week (15 Oct 2019 09:55)  Keppra 750 mg oral tablet: 1 tab(s) orally 2 times a day (15 Oct 2019 09:55)  lisinopril 5 mg oral tablet: 1 tab(s) orally once a day (15 Oct 2019 09:55)  omeprazole 20 mg oral delayed release capsule: 1 cap(s) orally once a day (15 Oct 2019 09:55)  pravastatin 20 mg oral tablet: 1 tab(s) orally once a day (15 Oct 2019 09:55)  Vitamin D3 50,000 intl units oral capsule: 1 cap(s) orally once a month (15 Oct 2019 09:55)    VITALS  Currently in sinus rhythm with vitals as below  ICU Vital Signs Last 24 Hrs  T(C): 35.2 (16 Nov 2020 00:00), Max: 37.6 (15 Nov 2020 12:00)  T(F): 95.4 (16 Nov 2020 00:00), Max: 99.7 (15 Nov 2020 12:00)  HR: 80 (16 Nov 2020 02:45) (79 - 80)  ABP: 119/68 (16 Nov 2020 02:45) (55/44 - 334/274)  ABP(mean): 85 (16 Nov 2020 02:45) (49 - 334)  RR: 10 (16 Nov 2020 02:45) (0 - 29)  SpO2: 98% (16 Nov 2020 02:45) (96% - 100%)    Adult Advanced Hemodynamics Last 24 Hrs  CVP(mm Hg): 12 (16 Nov 2020 02:45) (11 - 20)  CO: 4.8 (16 Nov 2020 02:00) (3.8 - 6.1)  CI: 2.3 (16 Nov 2020 02:00) (1.8 - 2.9)  PA: 44/17 (16 Nov 2020 02:45) (42/16 - 58/23)  PA(mean): 30 (16 Nov 2020 02:45) (27 - 37)  SVR: 1165 (16 Nov 2020 02:00) (720 - 1942)  SVRI: 2431 (16 Nov 2020 02:00) (8025 - 4128)    LABS                        10.4   18.06 )-----------( 90       ( 15 Nov 2020 23:38 )             32.4   PT/INR - ( 15 Nov 2020 21:14 )   PT: 14.3 sec;   INR: 1.25 ratio         PTT - ( 15 Nov 2020 23:38 )  PTT:29.5 sec11-15    141  |  105  |  41.0<H>  ----------------------------<  114<H>  4.8   |  20.0<L>  |  2.66<H>    Ca    7.9<L>      15 Nov 2020 23:38  Mg     2.6     11-15    TPro  5.3<L>  /  Alb  3.2<L>  /  TBili  1.7  /  DBili  x   /  AST  41<H>  /  ALT  19  /  AlkPhos  37<L>  11-15  CAPILLARY BLOOD GLUCOSE      POCT Blood Glucose.: 109 mg/dL (16 Nov 2020 02:50)      ABG - ( 16 Nov 2020 02:56 )  pH, Arterial: 7.38  pH, Blood: x     /  pCO2: 38    /  pO2: 103   / HCO3: 22    / Base Excess: -2.5  /  SaO2: 98          Mode: AC/ CMV (Assist Control/ Continuous Mandatory Ventilation)  RR (machine): 10  TV (machine): 800  FiO2: 30  PEEP: 5  MAP: 11  PIP: 34    IN/OUT    11-14-20 @ 07:01  -  11-15-20 @ 07:00  --------------------------------------------------------  IN: 2118 mL / OUT: 1145 mL / NET: 973 mL    11-15-20 @ 07:01  -  11-16-20 @ 03:49  --------------------------------------------------------  IN: 1462.7 mL / OUT: 1863 mL / NET: -400.3 mL      IMAGING  personally reviewed imaging     CURRENT MEDICATIONS  MEDICATIONS  (STANDING):  aspirin  chewable 324 milliGRAM(s) Oral daily  atorvastatin 40 milliGRAM(s) Oral at bedtime  chlorhexidine 0.12% Liquid 15 milliLiter(s) Oral Mucosa every 12 hours  chlorhexidine 2% Cloths 1 Application(s) Topical daily  CRRT Treatment    <Continuous>  dexMEDEtomidine Infusion 0.5 MICROgram(s)/kG/Hr (12.5 mL/Hr) IV Continuous <Continuous>  dextrose 50% Injectable 50 milliLiter(s) IV Push every 15 minutes  dextrose 50% Injectable 25 milliLiter(s) IV Push every 15 minutes  EPINEPHrine    Infusion 0.051 MICROgram(s)/kG/Min (19 mL/Hr) IV Continuous <Continuous>  folic acid 1 milliGRAM(s) Oral daily  heparin   Injectable 5000 Unit(s) SubCutaneous every 8 hours  insulin regular Infusion 8 Unit(s)/Hr (8 mL/Hr) IV Continuous <Continuous>  levETIRAcetam 750 milliGRAM(s) Oral two times a day  norepinephrine Infusion 0.05 MICROgram(s)/kG/Min (9.36 mL/Hr) IV Continuous <Continuous>  pantoprazole  Injectable 40 milliGRAM(s) IV Push every 12 hours  Phoxillum Filtration BK 4 / 2.5 5000 milliLiter(s) (1000 mL/Hr) CRRT <Continuous>  Phoxillum Filtration BK 4 / 2.5 5000 milliLiter(s) (500 mL/Hr) CRRT <Continuous>  Phoxillum Filtration BK 4 / 2.5 5000 milliLiter(s) (1500 mL/Hr) CRRT <Continuous>  propofol Infusion 41.754 MICROgram(s)/kG/Min (25 mL/Hr) IV Continuous <Continuous>  sodium chloride 0.9%. 1000 milliLiter(s) (10 mL/Hr) IV Continuous <Continuous>  vancomycin  IVPB 1000 milliGRAM(s) IV Intermittent <User Schedule>  vasopressin Infusion 0.067 Unit(s)/Min (4 mL/Hr) IV Continuous <Continuous>    MEDICATIONS  (PRN):  fentaNYL    Injectable 50 MICROGram(s) IV Push every 3 hours PRN Severe Pain (7 - 10)

## 2020-11-16 NOTE — PROGRESS NOTE ADULT - PROBLEM SELECTOR PLAN 1
Remains intubated and sedated. S/p multiple blood products including 3 PRBC post-operatively- since with stable h and h - will continue to trend      [place]-rodrigo catheter in place, monitoring Cardiac index hourly and continuous SVo2. Index stable >2 with inotropic support. Decreased RV function - epinephrine, inhaled NO. Fluid responsive. Lactate downtrended to normal.     Currently AV paced at 80, underlying junctional in 50s.    Plan to wean sedation later in AM and possibly extubate. Wean Inhaled NO as tolerated. Wean pressors and inotropes as tolerated.     Facial edema s/p multiple blood products in OR. Likely secondary to volume overload. Resolved significantly. Decadron Q6 for presumed airway edema completed x3 doses.

## 2020-11-16 NOTE — CHART NOTE - NSCHARTNOTEFT_GEN_A_CORE
ELSA Amezcua called to bedside   patient with acute episode of not getting tidal volumes on the vent - without desaturations   ambu bag utilized to hand bag patient - resp therapy called to bedside to change out ventilator tubing   patient sedated with propofol and fentanyl in the acute time period to decrease competition with hand breaths and possible competition with ventilator causing issues with volume  patient did not desat during incident   tubing changed out - reconnected to ventilator - patient receiving adequate volumes   stat ABG ordered   will continue to monitor

## 2020-11-16 NOTE — PROGRESS NOTE ADULT - PROBLEM SELECTOR PLAN 7
ASA for acute graft patency prophylaxis. Lipitor for chronic graft patency prophylaxis. Heparin 5000 SQ for DVT prophylaxis. Vanco/zinacef for infection prophylaxis. BB when able for afib prophylaxis. Protonix for GIB prophylaxis.

## 2020-11-16 NOTE — PROGRESS NOTE ADULT - ASSESSMENT
61 y/o M with PMH of CAD s/p stent in LAD, leaky aortic valve, uncontrolled bp, s/p aortic dissection repair 2010, CVA left sided residual weakness, s/p colostomy with reversal, presents from home with sob and LE edema. Echo indicating EF 55% moderate severe, MR, Moderate TR, mod-sever AI, grade II diastolic dysfunction. SANTOS/CKD receiving HD via right IJ permacath. Pt getting EGD and Coloscopy 11/9, 2/2 to anemia. Plan for Cath 11/10 with Dr. Lewis, HD as per renal     11/14 POD1 reop C2V, AVR/MVR. Critically stable on multi-pressor and multi-inotrope support, s/p multiple blood products. Oliguric on HD, plan for next HD session in AM.   11/15 POD 2 - Continues to be critically stable on multi-pressor support. Pressor requirements decreasing. Milrinone discontinued. Remains on epinephrine. Ashtyn weaned to 8  11/16 POD 3 - over the last 24 hours patient was weaned off Ashtyn, primacor, levophed and vasopressin for post operative cardiogenic shock with continued slow wean off epinepherine - patient remains critically stable at this time on full vent support with plans to trial to extubate for AM

## 2020-11-17 LAB
ALBUMIN SERPL ELPH-MCNC: 3.3 G/DL — SIGNIFICANT CHANGE UP (ref 3.3–5.2)
ALP SERPL-CCNC: 50 U/L — SIGNIFICANT CHANGE UP (ref 40–120)
ALT FLD-CCNC: 20 U/L — SIGNIFICANT CHANGE UP
ANION GAP SERPL CALC-SCNC: 11 MMOL/L — SIGNIFICANT CHANGE UP (ref 5–17)
ANION GAP SERPL CALC-SCNC: 12 MMOL/L — SIGNIFICANT CHANGE UP (ref 5–17)
APTT BLD: 28.5 SEC — SIGNIFICANT CHANGE UP (ref 27.5–35.5)
AST SERPL-CCNC: 33 U/L — SIGNIFICANT CHANGE UP
BASOPHILS # BLD AUTO: 0.03 K/UL — SIGNIFICANT CHANGE UP (ref 0–0.2)
BASOPHILS NFR BLD AUTO: 0.2 % — SIGNIFICANT CHANGE UP (ref 0–2)
BILIRUB SERPL-MCNC: 1.6 MG/DL — SIGNIFICANT CHANGE UP (ref 0.4–2)
BUN SERPL-MCNC: 22 MG/DL — HIGH (ref 8–20)
BUN SERPL-MCNC: 25 MG/DL — HIGH (ref 8–20)
CALCIUM SERPL-MCNC: 7.7 MG/DL — LOW (ref 8.6–10.2)
CALCIUM SERPL-MCNC: 7.8 MG/DL — LOW (ref 8.6–10.2)
CHLORIDE SERPL-SCNC: 102 MMOL/L — SIGNIFICANT CHANGE UP (ref 98–107)
CHLORIDE SERPL-SCNC: 103 MMOL/L — SIGNIFICANT CHANGE UP (ref 98–107)
CO2 SERPL-SCNC: 21 MMOL/L — LOW (ref 22–29)
CO2 SERPL-SCNC: 22 MMOL/L — SIGNIFICANT CHANGE UP (ref 22–29)
CREAT SERPL-MCNC: 1.28 MG/DL — SIGNIFICANT CHANGE UP (ref 0.5–1.3)
CREAT SERPL-MCNC: 1.4 MG/DL — HIGH (ref 0.5–1.3)
EOSINOPHIL # BLD AUTO: 0.45 K/UL — SIGNIFICANT CHANGE UP (ref 0–0.5)
EOSINOPHIL NFR BLD AUTO: 3 % — SIGNIFICANT CHANGE UP (ref 0–6)
GLUCOSE BLDC GLUCOMTR-MCNC: 102 MG/DL — HIGH (ref 70–99)
GLUCOSE BLDC GLUCOMTR-MCNC: 82 MG/DL — SIGNIFICANT CHANGE UP (ref 70–99)
GLUCOSE BLDC GLUCOMTR-MCNC: 86 MG/DL — SIGNIFICANT CHANGE UP (ref 70–99)
GLUCOSE BLDC GLUCOMTR-MCNC: 96 MG/DL — SIGNIFICANT CHANGE UP (ref 70–99)
GLUCOSE SERPL-MCNC: 86 MG/DL — SIGNIFICANT CHANGE UP (ref 70–99)
GLUCOSE SERPL-MCNC: 87 MG/DL — SIGNIFICANT CHANGE UP (ref 70–99)
HCT VFR BLD CALC: 29.5 % — LOW (ref 39–50)
HCT VFR BLD CALC: 30.4 % — LOW (ref 39–50)
HCT VFR BLD CALC: 31.6 % — LOW (ref 39–50)
HGB BLD-MCNC: 9.4 G/DL — LOW (ref 13–17)
HGB BLD-MCNC: 9.8 G/DL — LOW (ref 13–17)
HGB BLD-MCNC: 9.9 G/DL — LOW (ref 13–17)
IMM GRANULOCYTES NFR BLD AUTO: 0.7 % — SIGNIFICANT CHANGE UP (ref 0–1.5)
INR BLD: 1.24 RATIO — HIGH (ref 0.88–1.16)
LYMPHOCYTES # BLD AUTO: 1.46 K/UL — SIGNIFICANT CHANGE UP (ref 1–3.3)
LYMPHOCYTES # BLD AUTO: 9.6 % — LOW (ref 13–44)
MAGNESIUM SERPL-MCNC: 2.4 MG/DL — SIGNIFICANT CHANGE UP (ref 1.6–2.6)
MCHC RBC-ENTMCNC: 30.2 PG — SIGNIFICANT CHANGE UP (ref 27–34)
MCHC RBC-ENTMCNC: 30.3 PG — SIGNIFICANT CHANGE UP (ref 27–34)
MCHC RBC-ENTMCNC: 30.4 PG — SIGNIFICANT CHANGE UP (ref 27–34)
MCHC RBC-ENTMCNC: 31.3 GM/DL — LOW (ref 32–36)
MCHC RBC-ENTMCNC: 31.9 GM/DL — LOW (ref 32–36)
MCHC RBC-ENTMCNC: 32.2 GM/DL — SIGNIFICANT CHANGE UP (ref 32–36)
MCV RBC AUTO: 94.1 FL — SIGNIFICANT CHANGE UP (ref 80–100)
MCV RBC AUTO: 95.5 FL — SIGNIFICANT CHANGE UP (ref 80–100)
MCV RBC AUTO: 96.3 FL — SIGNIFICANT CHANGE UP (ref 80–100)
MONOCYTES # BLD AUTO: 1.51 K/UL — HIGH (ref 0–0.9)
MONOCYTES NFR BLD AUTO: 10 % — SIGNIFICANT CHANGE UP (ref 2–14)
NEUTROPHILS # BLD AUTO: 11.58 K/UL — HIGH (ref 1.8–7.4)
NEUTROPHILS NFR BLD AUTO: 76.5 % — SIGNIFICANT CHANGE UP (ref 43–77)
PLATELET # BLD AUTO: 63 K/UL — LOW (ref 150–400)
PLATELET # BLD AUTO: 68 K/UL — LOW (ref 150–400)
PLATELET # BLD AUTO: 74 K/UL — LOW (ref 150–400)
POTASSIUM SERPL-MCNC: 4.6 MMOL/L — SIGNIFICANT CHANGE UP (ref 3.5–5.3)
POTASSIUM SERPL-MCNC: 4.7 MMOL/L — SIGNIFICANT CHANGE UP (ref 3.5–5.3)
POTASSIUM SERPL-SCNC: 4.6 MMOL/L — SIGNIFICANT CHANGE UP (ref 3.5–5.3)
POTASSIUM SERPL-SCNC: 4.7 MMOL/L — SIGNIFICANT CHANGE UP (ref 3.5–5.3)
PROT SERPL-MCNC: 5.4 G/DL — LOW (ref 6.6–8.7)
PROTHROM AB SERPL-ACNC: 14.2 SEC — HIGH (ref 10.6–13.6)
RBC # BLD: 3.09 M/UL — LOW (ref 4.2–5.8)
RBC # BLD: 3.23 M/UL — LOW (ref 4.2–5.8)
RBC # BLD: 3.28 M/UL — LOW (ref 4.2–5.8)
RBC # FLD: 14.6 % — HIGH (ref 10.3–14.5)
RBC # FLD: 14.8 % — HIGH (ref 10.3–14.5)
RBC # FLD: 14.9 % — HIGH (ref 10.3–14.5)
SODIUM SERPL-SCNC: 135 MMOL/L — SIGNIFICANT CHANGE UP (ref 135–145)
SODIUM SERPL-SCNC: 136 MMOL/L — SIGNIFICANT CHANGE UP (ref 135–145)
WBC # BLD: 13.75 K/UL — HIGH (ref 3.8–10.5)
WBC # BLD: 15.13 K/UL — HIGH (ref 3.8–10.5)
WBC # BLD: 15.92 K/UL — HIGH (ref 3.8–10.5)
WBC # FLD AUTO: 13.75 K/UL — HIGH (ref 3.8–10.5)
WBC # FLD AUTO: 15.13 K/UL — HIGH (ref 3.8–10.5)
WBC # FLD AUTO: 15.92 K/UL — HIGH (ref 3.8–10.5)

## 2020-11-17 PROCEDURE — 99232 SBSQ HOSP IP/OBS MODERATE 35: CPT | Mod: 24

## 2020-11-17 PROCEDURE — 90937 HEMODIALYSIS REPEATED EVAL: CPT

## 2020-11-17 RX ORDER — DOPAMINE HYDROCHLORIDE 40 MG/ML
2 INJECTION, SOLUTION, CONCENTRATE INTRAVENOUS
Qty: 400 | Refills: 0 | Status: DISCONTINUED | OUTPATIENT
Start: 2020-11-17 | End: 2020-11-17

## 2020-11-17 RX ORDER — WARFARIN SODIUM 2.5 MG/1
5 TABLET ORAL ONCE
Refills: 0 | Status: COMPLETED | OUTPATIENT
Start: 2020-11-17 | End: 2020-11-17

## 2020-11-17 RX ORDER — FENTANYL CITRATE 50 UG/ML
50 INJECTION INTRAVENOUS ONCE
Refills: 0 | Status: DISCONTINUED | OUTPATIENT
Start: 2020-11-17 | End: 2020-11-17

## 2020-11-17 RX ORDER — OXYCODONE HYDROCHLORIDE 5 MG/1
5 TABLET ORAL EVERY 4 HOURS
Refills: 0 | Status: DISCONTINUED | OUTPATIENT
Start: 2020-11-17 | End: 2020-11-23

## 2020-11-17 RX ORDER — OXYCODONE HYDROCHLORIDE 5 MG/1
10 TABLET ORAL EVERY 4 HOURS
Refills: 0 | Status: DISCONTINUED | OUTPATIENT
Start: 2020-11-17 | End: 2020-11-23

## 2020-11-17 RX ORDER — ALBUMIN HUMAN 25 %
250 VIAL (ML) INTRAVENOUS ONCE
Refills: 0 | Status: DISCONTINUED | OUTPATIENT
Start: 2020-11-17 | End: 2020-11-17

## 2020-11-17 RX ORDER — DEXTROSE 50 % IN WATER 50 %
15 SYRINGE (ML) INTRAVENOUS ONCE
Refills: 0 | Status: DISCONTINUED | OUTPATIENT
Start: 2020-11-17 | End: 2020-11-18

## 2020-11-17 RX ORDER — SODIUM CHLORIDE 9 MG/ML
1000 INJECTION, SOLUTION INTRAVENOUS
Refills: 0 | Status: DISCONTINUED | OUTPATIENT
Start: 2020-11-17 | End: 2020-11-18

## 2020-11-17 RX ORDER — ONDANSETRON 8 MG/1
4 TABLET, FILM COATED ORAL ONCE
Refills: 0 | Status: COMPLETED | OUTPATIENT
Start: 2020-11-17 | End: 2020-11-17

## 2020-11-17 RX ORDER — LIDOCAINE 4 G/100G
1 CREAM TOPICAL EVERY 24 HOURS
Refills: 0 | Status: DISCONTINUED | OUTPATIENT
Start: 2020-11-17 | End: 2020-11-30

## 2020-11-17 RX ORDER — GLUCAGON INJECTION, SOLUTION 0.5 MG/.1ML
1 INJECTION, SOLUTION SUBCUTANEOUS ONCE
Refills: 0 | Status: DISCONTINUED | OUTPATIENT
Start: 2020-11-17 | End: 2020-11-18

## 2020-11-17 RX ORDER — QUETIAPINE FUMARATE 200 MG/1
25 TABLET, FILM COATED ORAL AT BEDTIME
Refills: 0 | Status: COMPLETED | OUTPATIENT
Start: 2020-11-17 | End: 2020-11-17

## 2020-11-17 RX ORDER — INSULIN LISPRO 100/ML
VIAL (ML) SUBCUTANEOUS
Refills: 0 | Status: DISCONTINUED | OUTPATIENT
Start: 2020-11-17 | End: 2020-11-19

## 2020-11-17 RX ORDER — LANOLIN ALCOHOL/MO/W.PET/CERES
5 CREAM (GRAM) TOPICAL AT BEDTIME
Refills: 0 | Status: COMPLETED | OUTPATIENT
Start: 2020-11-17 | End: 2020-11-17

## 2020-11-17 RX ADMIN — FENTANYL CITRATE 50 MICROGRAM(S): 50 INJECTION INTRAVENOUS at 06:43

## 2020-11-17 RX ADMIN — OXYCODONE HYDROCHLORIDE 10 MILLIGRAM(S): 5 TABLET ORAL at 06:43

## 2020-11-17 RX ADMIN — OXYCODONE HYDROCHLORIDE 5 MILLIGRAM(S): 5 TABLET ORAL at 08:30

## 2020-11-17 RX ADMIN — OXYCODONE HYDROCHLORIDE 5 MILLIGRAM(S): 5 TABLET ORAL at 07:59

## 2020-11-17 RX ADMIN — FENTANYL CITRATE 50 MICROGRAM(S): 50 INJECTION INTRAVENOUS at 01:57

## 2020-11-17 RX ADMIN — CHLORHEXIDINE GLUCONATE 1 APPLICATION(S): 213 SOLUTION TOPICAL at 16:34

## 2020-11-17 RX ADMIN — FENTANYL CITRATE 50 MICROGRAM(S): 50 INJECTION INTRAVENOUS at 02:15

## 2020-11-17 RX ADMIN — Medication 5 MILLIGRAM(S): at 22:06

## 2020-11-17 RX ADMIN — FENTANYL CITRATE 50 MICROGRAM(S): 50 INJECTION INTRAVENOUS at 05:45

## 2020-11-17 RX ADMIN — OXYCODONE HYDROCHLORIDE 10 MILLIGRAM(S): 5 TABLET ORAL at 03:54

## 2020-11-17 RX ADMIN — Medication 81 MILLIGRAM(S): at 16:34

## 2020-11-17 RX ADMIN — WARFARIN SODIUM 5 MILLIGRAM(S): 2.5 TABLET ORAL at 22:41

## 2020-11-17 RX ADMIN — ONDANSETRON 4 MILLIGRAM(S): 8 TABLET, FILM COATED ORAL at 23:20

## 2020-11-17 RX ADMIN — LEVETIRACETAM 750 MILLIGRAM(S): 250 TABLET, FILM COATED ORAL at 05:20

## 2020-11-17 RX ADMIN — ATORVASTATIN CALCIUM 40 MILLIGRAM(S): 80 TABLET, FILM COATED ORAL at 22:10

## 2020-11-17 RX ADMIN — QUETIAPINE FUMARATE 25 MILLIGRAM(S): 200 TABLET, FILM COATED ORAL at 22:07

## 2020-11-17 RX ADMIN — Medication 1 MILLIGRAM(S): at 16:34

## 2020-11-17 RX ADMIN — ONDANSETRON 4 MILLIGRAM(S): 8 TABLET, FILM COATED ORAL at 10:39

## 2020-11-17 RX ADMIN — PANTOPRAZOLE SODIUM 40 MILLIGRAM(S): 20 TABLET, DELAYED RELEASE ORAL at 18:23

## 2020-11-17 RX ADMIN — LEVETIRACETAM 750 MILLIGRAM(S): 250 TABLET, FILM COATED ORAL at 18:24

## 2020-11-17 RX ADMIN — PANTOPRAZOLE SODIUM 40 MILLIGRAM(S): 20 TABLET, DELAYED RELEASE ORAL at 05:15

## 2020-11-17 NOTE — PROGRESS NOTE ADULT - SUBJECTIVE AND OBJECTIVE BOX
[facility]                                          [address] , [address], [place] [address]                                               PHONE: [phone]    FAX: [phone] [address], [address], [place] [address]                                       PHONE: [phone]    FAX: [phone]  *******************************************************************************    Overnight events/Subjective Assessment: Pt reports poor sleep.  No CP or SOB.  Continued fluid removal with CVVH, Remains on inotropic support.    INTERPRETATION OF TELEMETRY (personally reviewed): AV pacing at 80bpm, underlying rhythm is junctional at 50bpm    penicillin (Unknown)    MEDICATIONS  (STANDING):  albumin human  5% IVPB 250 milliLiter(s) IV Intermittent once  aspirin enteric coated 81 milliGRAM(s) Oral daily  atorvastatin 40 milliGRAM(s) Oral at bedtime  chlorhexidine 2% Cloths 1 Application(s) Topical daily  CRRT Treatment    <Continuous>  dextrose 50% Injectable 50 milliLiter(s) IV Push every 15 minutes  dextrose 50% Injectable 25 milliLiter(s) IV Push every 15 minutes  DOPamine Infusion 2 MICROgram(s)/kG/Min (7.48 mL/Hr) IV Continuous <Continuous>  folic acid 1 milliGRAM(s) Oral daily  levETIRAcetam 750 milliGRAM(s) Oral two times a day  melatonin 5 milliGRAM(s) Oral at bedtime  milrinone Infusion 0.25 MICROgram(s)/kG/Min (7.48 mL/Hr) IV Continuous <Continuous>  pantoprazole  Injectable 40 milliGRAM(s) IV Push every 12 hours  Phoxillum Filtration BK 4 / 2.5 5000 milliLiter(s) (1000 mL/Hr) CRRT <Continuous>  Phoxillum Filtration BK 4 / 2.5 5000 milliLiter(s) (1500 mL/Hr) CRRT <Continuous>  Phoxillum Filtration BK 4 / 2.5 5000 milliLiter(s) (1500 mL/Hr) CRRT <Continuous>  sodium chloride 0.9%. 1000 milliLiter(s) (10 mL/Hr) IV Continuous <Continuous>  warfarin 5 milliGRAM(s) Oral once    MEDICATIONS  (PRN):  benzocaine 15 mG/menthol 3.6 mG (Sugar-Free) Lozenge 1 Lozenge Oral three times a day PRN Sore Throat  oxyCODONE    IR 5 milliGRAM(s) Oral every 4 hours PRN Moderate Pain (4 - 6)  oxyCODONE    IR 10 milliGRAM(s) Oral every 4 hours PRN Severe Pain (7 - 10)      Vital Signs Last 24 Hrs  T(C): 36.7 (17 Nov 2020 08:00), Max: 36.8 (16 Nov 2020 23:50)  T(F): 98 (17 Nov 2020 08:00), Max: 98.3 (16 Nov 2020 23:50)  HR: 80 (17 Nov 2020 08:30) (79 - 80)  BP: --  BP(mean): --  RR: 22 (17 Nov 2020 08:30) (17 - 39)  SpO2: 95% (17 Nov 2020 08:30) (90% - 100%)    I&O's Detail    16 Nov 2020 07:01  -  17 Nov 2020 07:00  --------------------------------------------------------  IN:    EPINEPHrine: 103 mL    IV PiggyBack: 250 mL    Milrinone: 112.5 mL    Oral Fluid: 100 mL    sodium chloride 0.9%: 495 mL  Total IN: 1060.5 mL    OUT:    Chest Tube (mL): 720 mL    Chest Tube (mL): 20 mL    Chest Tube (mL): 420 mL    Dexmedetomidine: 0 mL    Indwelling Catheter - Urethral (mL): 275 mL    Insulin: 0 mL    Norepinephrine: 0 mL    Other (mL): 1464 mL    Propofol: 0 mL    Vasopressin: 0 mL  Total OUT: 2899 mL    Total NET: -1838.5 mL      17 Nov 2020 07:01  -  17 Nov 2020 09:15  --------------------------------------------------------  IN:    DOPamine Infusion: 7.5 mL    Milrinone: 15 mL    Oral Fluid: 100 mL  Total IN: 122.5 mL    OUT:    Chest Tube (mL): 0 mL    Chest Tube (mL): 80 mL    Chest Tube (mL): 10 mL    Other (mL): 56 mL  Total OUT: 146 mL    Total NET: -23.5 mL        I&O's Summary    16 Nov 2020 07:01  -  17 Nov 2020 07:00  --------------------------------------------------------  IN: 1060.5 mL / OUT: 2899 mL / NET: -1838.5 mL    17 Nov 2020 07:01  -  17 Nov 2020 09:15  --------------------------------------------------------  IN: 122.5 mL / OUT: 146 mL / NET: -23.5 mL            PHYSICAL EXAM:  General: Appears well developed, well nourished, no acute distress  HEENT: Head: normocephalic, atraumatic, neck lines in place  Eyes: Pupils equal and reactive  Neck: Supple, no carotid bruit, no JVD,  CARDIOVASCULAR: Normal S1 and S2, 1/6 ALLIE  CHEST: clean sternotomy dressing  LUNGS: Decreased at bases, otherwise clear to auscultation bilaterally, no rales, rhonchi or wheeze  ABDOMEN: Soft, nontender, non-distended, positive bowel sounds, no mass or bruit  EXTREMITIES: diffuse edema, distal pulses WNL  SKIN: Warm and dry with normal turgor  NEURO: Alert & oriented x 3, grossly intact, lethargic  PSYCH: seemingly normal mood and affect      LABS:                        9.8    15.13 )-----------( 68       ( 17 Nov 2020 03:07 )             30.4     11-17    136  |  103  |  25.0<H>  ----------------------------<  86  4.6   |  22.0  |  1.40<H>    Ca    7.7<L>      17 Nov 2020 03:07  Phos  7.0     11-16  Mg     2.4     11-17    TPro  5.4<L>  /  Alb  3.3  /  TBili  1.6  /  DBili  x   /  AST  33  /  ALT  20  /  AlkPhos  50  11-17        PT/INR - ( 17 Nov 2020 03:07 )   PT: 14.2 sec;   INR: 1.24 ratio         PTT - ( 17 Nov 2020 03:07 )  PTT:28.5 sec  serum  Lipids:     Thyroid Stimulating Hormone, Serum: 1.95 uIU/mL (11-03 @ 08:18)      ASSESSMENT AND PLAN:  In summary, JONATHAN GIBSON is a 60y Male with past medical history significant for HTN, HLD, CAD, ascending aortic dissection s/p repair (2001), chronic diastolic HFpEF, moderate valvular heart disease including moderate aortic and mitral regurgitation, admitted with CP/SOB/elevated troponin.    - Echocardiogram 11/4/20 EF 50-55%, grade II diastolic dysfunction, mildly reduced RV systolic function, severe LAE, mild KOLTON, mod-severe MR and AI, mod TR, dilated aortic root @ 4.2cm  - Cardiac cath 11/10/20: severe 2 vessel CAD.  Chronically occluded proximal RCA with collaterals from the LCA.  EF 50%.  Elevated LVEDP.  - s/p reop AVR 25mm CE 2700TFX, MVR 31mm Magna Ease, CABGx2 (DGV-OM, RPDA) with Dr. Butler 11/13/20  - Now off of nitric oxide and extubated  - Remains on dopamine and primacor  - AV pacing on telemetry with underlying junctional rhythm in 50s.  Will monitor for recovery, by may need PPM prior to discharge.  Dr. Raygoza (EP) aware.  Discussed with Dr. Butler and decision will be made by the end of the week.  - CVVH in place.  Continue for fluid removal.  - Continue ASA 81 and Lipitor 40.  - AC as per CTS  - Glycemic control as per CTS  - Pain management as per CTS  - Tube and line management as per CTS  - Continue Keppra for seizures  - [facility]    Celso Stockton MD

## 2020-11-17 NOTE — PROGRESS NOTE ADULT - ASSESSMENT
In summary, JONATHAN GIBSON is a 60y Male with past medical history significant for HTN, HLD, CAD, ascending aortic dissection s/p repair (2001), chronic diastolic HFpEF, moderate valvular heart disease including moderate aortic and mitral regurgitation, admitted with CP/SOB/elevated troponin.    Per Cardiology :     - Echocardiogram 11/4/20 EF 50-55%, grade II diastolic dysfunction, mildly reduced RV systolic function, severe LAE, mild KOLTON, mod-severe MR and AI, mod TR, dilated aortic root @ 4.2cm  - Cardiac cath 11/10/20: severe 2 vessel CAD.  Chronically occluded proximal RCA with collaterals from the LCA.  EF 50%.  Elevated LVEDP.  - s/p Re - Surgery :  AVR 25mm CE 2700TFX, MVR 31mm Magna Ease, CABG x 2 (DGV-OM, RPDA) - Dr. Butler 11/13/20  - Now off of nitric oxide and extubated  - Remains on dopamine and primacor  - AV pacing on telemetry with underlying junctional rhythm in 50s.  Will monitor for recovery, by may need PPM prior to discharge.    - CVVHDF in place.  Continue for fluid removal.  - Continue ASA 81 and Lipitor 40 mg.,   - Glycemic control as per CTS  - On Keppra for seizures  - D/W  Dr. Butler, [facility] PA and RN    Now Negative 75 ml/hour.

## 2020-11-17 NOTE — PROGRESS NOTE ADULT - PROBLEM SELECTOR PLAN 1
Remains intubated and sedated. S/p multiple blood products including 3 PRBC post-operatively- since with stable h and h - will continue to trend      Spanaway-rodrigo catheter in place, monitoring Cardiac index hourly and continuous SVo2. Index stable >2 with inotropic support. Decreased RV function - epinephrine, inhaled NO. Fluid responsive. Lactate downtrended to normal.     Currently AV paced at 80, underlying junctional in 50s.   Wean epi, continue primacor

## 2020-11-17 NOTE — PHYSICAL THERAPY INITIAL EVALUATION ADULT - LEVEL OF INDEPENDENCE: SUPINE/SIT, REHAB EVAL
unable to perform/held for safety as per RN, therex within bed: cheyenne elbow flexion/extension/ cheyenne wrist flexion/extension, quad sets, glute sets, cheyenne ankle pumps,

## 2020-11-17 NOTE — PHYSICAL THERAPY INITIAL EVALUATION ADULT - GENERAL OBSERVATIONS, REHAB EVAL
pt received semi callahan position in bed, RN agreeable to therex in bed only today, CRRT, bear hugger, 3 chest tubes, bipap, cardiac monitor

## 2020-11-17 NOTE — PROGRESS NOTE ADULT - SUBJECTIVE AND OBJECTIVE BOX
Patient was seen and evaluated on dialysis.   Patient is tolerating the procedure well.   T(C): 36.7 (20 @ 08:00), Max: 36.8 (20 @ 23:50)  HR: 80 (20 @ 08:30) (79 - 80)  Continue dialysis: CRRT    Summary of KDIGO Recommendation Statements: Dialysis Interventions for Treatment of SANTOS  5.1.1: Initiate RRT emergently when life-threatening changes in fluid, electrolyte, and acid-base balance exist. (Not Graded)  5.1.2: Consider the broader clinical context, the presence of conditions that can be modified with RRT, and trends of laboratory  tests—rather than single BUN and creatinine thresholds alone—when making the decision to start RRT. (Not Graded)  5.2.1: Discontinue RRT when it is no longer required, either because intrinsic kidney function has recovered to the point that it is  adequate to meet patient needs, or because RRT is no longer consistent with the goals of care. (Not Graded)  5.2.2: We suggest not using diuretics to enhance kidney function recovery, or to reduce the duration or frequency of RRT. (2B)  5.3.1: In a patient with SANTOS requiring RRT, base the decision to use anticoagulation for RRT on assessment of the patient’s potential  risks and benefits from anticoagulation (see Figure 17). (Not Graded)  5.3.1.1: We recommend using anticoagulation during RRT in SANTOS if a patient does not have an increased bleeding risk or  impaired coagulation and is not already receiving systemic anticoagulation. (1B)  5.3.2: For patients without an increased bleeding risk or impaired coagulation and not already receiving effective systemic  anticoagulation, we suggest the followin.3.2.1: For anticoagulation in intermittent RRT, we recommend using either unfractionated or low-molecular-weight heparin,  rather than other anticoagulants. (1C)  5.3.2.2: For anticoagulation in CRRT, we suggest using regional citrate anticoagulation rather than heparin in patients who do  not have contraindications for citrate. (2B)  5.3.2.3: For anticoagulation during CRRT in patients who have contraindications for citrate, we suggest using either  unfractionated or low-molecular-weight heparin, rather than other anticoagulants. (2C)  5.3.3: For patients with increased bleeding risk who are not receiving anticoagulation, we suggest the following for anticoagulation  during RRT:  5.3.3.1: We suggest using regional citrate anticoagulation, rather than no anticoagulation, during CRRT in a patient without  contraindications for citrate. (2C)  5.3.3.2: We suggest avoiding regional heparinization during CRRT in a patient with increased risk of bleeding. (2C)  5.3.4: In a patient with heparin-induced thrombocytopenia (HIT), all heparin must be stopped and we recommend using direct  thrombin inhibitors (such as argatroban) or Factor Xa inhibitors (such as danaparoid or fondaparinux) rather than other or no  anticoagulation during RRT. (1A)  5.3.4.1: In a patient with HIT who does not have severe liver failure, we suggest using argatroban rather than other thrombin  or Factor Xa inhibitors during RRT. (2C)  5.4.1: We suggest initiating RRT in patients with SANTOS via an uncuffed nontunneled dialysis catheter, rather than a tunneled catheter.(2D)  5.4.2: When choosing a vein for insertion of a dialysis catheter in patients with SANTOS, consider these preferences (Not Graded):  ? First choice: right jugular vein;  ? Second choice: femoral vein;  ? Third choice: left jugular vein;  ? Last choice: subclavian vein with preference for the dominant side.  5.4.3: We recommend using ultrasound guidance for dialysis catheter insertion. (1A)  5.4.4: We recommend obtaining a chest radiograph promptly after placement and before first use of an internal jugular or subclavian  dialysis catheter. (1B)  5.4.5: We suggest not using topical antibiotics over the skin insertion site of a nontunneled dialysis catheter in ICU patients with SANTOS  requiring RRT. (2C)  5.4.6: We suggest not using antibiotic locks for prevention of catheter-related infections of nontunneled dialysis catheters in SANTOS  requiring RRT. (2C)  5.5.1: We suggest to use dialyzers with a biocompatible membrane for IHD and CRRT in patients with SANTOS. (2C)  5.6.1: Use continuous and intermittent RRT as complementary therapies in SANTOS patients. (Not Graded)  5.6.2: We suggest using CRRT, rather than standard intermittent RRT, for hemodynamically unstable patients. (2B)  5.6.3: We suggest using CRRT, rather than intermittent RRT, for SANTOS patients with acute brain injury or other causes of increased  intracranial pressure or generalized brain edema. (2B)  5.7.1: We suggest using bicarbonate, rather than lactate, as a buffer in dialysate and replacement fluid for RRT in patients with SANTOS. (2C)  5.7.2: We recommend using bicarbonate, rather than lactate, as a buffer in dialysate and replacement fluid for RRT in patients with  SANTOS and circulatory shock. (1B)  5.7.3: We suggest using bicarbonate, rather than lactate, as a buffer in dialysate and replacement fluid for RRT in patients with SANTOS  and liver failure and/or lactic acidemia. (2B)  5.7.4: We recommend that dialysis fluids and replacement fluids in patients with SANTOS, at a minimum, comply with American  Association of Medical Instrumentation (AAMI) standards regarding contamination with bacteria and endotoxins. (1B)  5.8.1: The dose of RRT to be delivered should be prescribed before starting each session of RRT. (Not Graded) We recommend  frequent assessment of the actual delivered dose in order to adjust the prescription. (1B)  5.8.2: Provide RRT to achieve the goals of electrolyte, acid-base, solute, and fluid balance that will meet the patient’s needs. (Not  Graded)  5.8.3: We recommend delivering a Kt/V of 3.9 per week when using intermittent or extended RRT in SANTOS. (1A)  5.8.4: We recommend delivering an effluent volume of 20-25 mL/kg/h for CRRT in SANTOS (1A). This will usually require a higher  prescription of effluent volume. (Not Graded)  Abbreviations: SANTOS, acute kidney injury; BUN, blood urea nitrogen; CRRT, continuous renal replacement therapy; ICU, intensive  care unit; RRT, renal replacement therapy.  Reproduced with permission of KDIGO from the KDIGO Clinical Practice Guideline for Acute Kidney Injury.    QB: 300 ml.,        QD: 1500ml.,/ Hour,   Goal UF 75 ml.,  over 1 Hour,    Wt: 112.8 Kg.,

## 2020-11-17 NOTE — PROGRESS NOTE ADULT - SUBJECTIVE AND OBJECTIVE BOX
Significant recent/past 24 hr events: patient is doing well overnight, continuing to wean epi 1cc every 2 hours, pirmacor at 0.25. SV02 is 57. Patient is AV paced at 80s, slow junctional underneath.    Subjective:    Review of Systems: had some back pain overnight, no other issues     Patient is a 60y old  Male who presents with a chief complaint of sob (16 Nov 2020 10:26)    HPI:  Patient is a 60 yoM with PMH of CAD s/p stent in LAD, leaky aortic valve, uncontrolled bp, thoracic aortic dissection requiring emergent surgery, CVA with left sided sensory loss, colostomy for bowel ischemia s/p reversal, psoriasis on Humira injections presents from home with sob and le edema. Patient states the past several weeks, he has not been taking his medications because he forgot. Patient states the sob has gotten worse over the past few days and also associated with chest pressure. Patient also hasnt seen his cardiologist in several months. Patient worked up in the er and had an elevated bnp and htn urgency and eric. Patient seen at bedside and states feeling a little better.  (02 Nov 2020 14:32)    PAST MEDICAL & SURGICAL HISTORY:  CVA (cerebral vascular accident)    CHF (congestive heart failure)    H/O colectomy    Aorta disorder      FAMILY HISTORY:  FH: hypertension        Vitals   ICU Vital Signs Last 24 Hrs  T(C): 36.8 (16 Nov 2020 23:50), Max: 36.8 (16 Nov 2020 23:50)  T(F): 98.3 (16 Nov 2020 23:50), Max: 98.3 (16 Nov 2020 23:50)  HR: 80 (17 Nov 2020 00:00) (60 - 80)  BP: --  BP(mean): --  ABP: 152/68 (17 Nov 2020 00:00) (95/62 - 169/73)  ABP(mean): 88 (17 Nov 2020 00:00) (75 - 99)  RR: 20 (17 Nov 2020 00:00) (7 - 34)  SpO2: 97% (17 Nov 2020 00:00) (94% - 100%)      VENT SETTINGS   Mode: CPAP with PS  FiO2: 30  PEEP: 5  PS: 10  MAP: 8    ABG - ( 16 Nov 2020 21:58 )  pH, Arterial: 7.37  pH, Blood: x     /  pCO2: 41    /  pO2: 99    / HCO3: 23    / Base Excess: -1.6  /  SaO2: 98                  I&O's Detail    15 Nov 2020 07:01  -  16 Nov 2020 07:00  --------------------------------------------------------  IN:    Dexmedetomidine: 188.3 mL    Enteral Tube Flush: 190 mL    EPINEPHrine: 297 mL    Insulin: 14.5 mL    IV PiggyBack: 250 mL    IV PiggyBack: 100 mL    Norepinephrine: 54 mL    Propofol: 266.7 mL    sodium chloride 0.9%: 380 mL    Vasopressin: 22.6 mL  Total IN: 1763.1 mL    OUT:    Chest Tube (mL): 40 mL    Chest Tube (mL): 30 mL    Chest Tube (mL): 10 mL    Chest Tube (mL): 330 mL    Indwelling Catheter - Urethral (mL): 370 mL    Nasogastric/Oral tube (mL): 150 mL    Other (mL): 1447 mL  Total OUT: 2377 mL    Total NET: -613.9 mL      16 Nov 2020 07:01  -  17 Nov 2020 00:57  --------------------------------------------------------  IN:    EPINEPHrine: 94 mL    IV PiggyBack: 250 mL    Milrinone: 60 mL    Oral Fluid: 100 mL    sodium chloride 0.9%: 425 mL  Total IN: 929 mL    OUT:    Chest Tube (mL): 10 mL    Chest Tube (mL): 285 mL    Chest Tube (mL): 280 mL    Dexmedetomidine: 0 mL    Indwelling Catheter - Urethral (mL): 245 mL    Insulin: 0 mL    Norepinephrine: 0 mL    Other (mL): 1007 mL    Propofol: 0 mL    Vasopressin: 0 mL  Total OUT: 1827 mL    Total NET: -898 mL          LABS                        10.0   16.76 )-----------( 73       ( 16 Nov 2020 21:50 )             30.9     11-16    138  |  103  |  29.0<H>  ----------------------------<  93  4.8   |  21.0<L>  |  1.60<H>    Ca    7.9<L>      16 Nov 2020 21:50  Phos  7.0     11-16  Mg     2.5     11-16    TPro  5.5<L>  /  Alb  3.4  /  TBili  1.6  /  DBili  x   /  AST  35  /  ALT  20  /  AlkPhos  49  11-16    PT/INR - ( 15 Nov 2020 21:14 )   PT: 14.3 sec;   INR: 1.25 ratio         PTT - ( 15 Nov 2020 23:38 )  PTT:29.5 sec          POCT Blood Glucose.: 102 mg/dL (11-17-20 @ 00:06)  POCT Blood Glucose.: 96 mg/dL (11-16-20 @ 14:43)  POCT Blood Glucose.: 99 mg/dL (11-16-20 @ 08:34)  POCT Blood Glucose.: 102 mg/dL (11-16-20 @ 06:41)  POCT Blood Glucose.: 109 mg/dL (11-16-20 @ 02:50)        MEDICATIONS  (STANDING):  aspirin enteric coated 81 milliGRAM(s) Oral daily  atorvastatin 40 milliGRAM(s) Oral at bedtime  chlorhexidine 2% Cloths 1 Application(s) Topical daily  CRRT Treatment    <Continuous>  dextrose 50% Injectable 50 milliLiter(s) IV Push every 15 minutes  dextrose 50% Injectable 25 milliLiter(s) IV Push every 15 minutes  EPINEPHrine    Infusion 0.051 MICROgram(s)/kG/Min (19 mL/Hr) IV Continuous <Continuous>  folic acid 1 milliGRAM(s) Oral daily  insulin regular Infusion 8 Unit(s)/Hr (8 mL/Hr) IV Continuous <Continuous>  levETIRAcetam 750 milliGRAM(s) Oral two times a day  melatonin 5 milliGRAM(s) Oral at bedtime  milrinone Infusion 0.25 MICROgram(s)/kG/Min (7.48 mL/Hr) IV Continuous <Continuous>  pantoprazole  Injectable 40 milliGRAM(s) IV Push every 12 hours  Phoxillum Filtration BK 4 / 2.5 5000 milliLiter(s) (1000 mL/Hr) CRRT <Continuous>  Phoxillum Filtration BK 4 / 2.5 5000 milliLiter(s) (1500 mL/Hr) CRRT <Continuous>  Phoxillum Filtration BK 4 / 2.5 5000 milliLiter(s) (1500 mL/Hr) CRRT <Continuous>  sodium chloride 0.9%. 1000 milliLiter(s) (10 mL/Hr) IV Continuous <Continuous>    MEDICATIONS  (PRN):  benzocaine 15 mG/menthol 3.6 mG (Sugar-Free) Lozenge 1 Lozenge Oral three times a day PRN Sore Throat  fentaNYL    Injectable 50 MICROGram(s) IV Push every 3 hours PRN Severe Pain (7 - 10)    Allergies:  penicillin (Unknown)      Physical Exam:   Constitutional: NAD, well-groomed, well-developed  HEENT: PERRLA, EOMI, no drainage or redness  Neck: supple,  No JVD  Respiratory: Breath Sounds equal & clear bilaterally to auscultation, no rales/rhonchi/wheezing, no accessory muscle use noted  Cardiovascular: Regular rate, regular rhythm, normal S1, S2; no murmurs or rub  Gastrointestinal: Soft, non-tender, non distended, + bowel sounds  Extremities: CORDOVA x 4, no peripheral edema, no cyanosis, no clubbing   Neurological: A+O x 3; speech clear and intact; no sensory, motor  deficits, normal reflexes  Skin: warm, dry, well perfused

## 2020-11-17 NOTE — PHYSICAL THERAPY INITIAL EVALUATION ADULT - CRITERIA FOR SKILLED THERAPEUTIC INTERVENTIONS
impairments found/functional limitations in following categories/anticipated discharge recommendation/rehab potential/therapy frequency/predicted duration of therapy intervention

## 2020-11-17 NOTE — PROGRESS NOTE ADULT - ASSESSMENT
59 y/o M with PMH of CAD s/p stent in LAD, leaky aortic valve, uncontrolled bp, s/p aortic dissection repair 2010, CVA left sided residual weakness, s/p colostomy with reversal, presents from home with sob and LE edema. Echo indicating EF 55% moderate severe, MR, Moderate TR, mod-sever AI, grade II diastolic dysfunction. SANTOS/CKD receiving HD via right IJ permacath. Pt getting EGD and Coloscopy 11/9, 2/2 to anemia. Plan for Cath 11/10 with Dr. Lewis, HD as per renal     11/14 POD1 reop C2V, AVR/MVR. Critically stable on multi-pressor and multi-inotrope support, s/p multiple blood products. Oliguric on HD, plan for next HD session in AM.   11/15 POD 2 - Continues to be critically stable on multi-pressor support. Pressor requirements decreasing. Milrinone discontinued. Remains on epinephrine. Ashtyn weaned to 8  11/16 POD 3 - over the last 24 hours patient was weaned off Ashtyn, primacor, levophed and vasopressin for post operative cardiogenic shock with continued slow wean off epinepherine - patient remains critically stable at this time on full vent support with plans to trial to extubate for AM

## 2020-11-18 LAB
ALBUMIN SERPL ELPH-MCNC: 3.2 G/DL — LOW (ref 3.3–5.2)
ALP SERPL-CCNC: 53 U/L — SIGNIFICANT CHANGE UP (ref 40–120)
ALT FLD-CCNC: 20 U/L — SIGNIFICANT CHANGE UP
ANION GAP SERPL CALC-SCNC: 14 MMOL/L — SIGNIFICANT CHANGE UP (ref 5–17)
ANION GAP SERPL CALC-SCNC: 17 MMOL/L — SIGNIFICANT CHANGE UP (ref 5–17)
APTT BLD: 29.6 SEC — SIGNIFICANT CHANGE UP (ref 27.5–35.5)
AST SERPL-CCNC: 31 U/L — SIGNIFICANT CHANGE UP
BASOPHILS # BLD AUTO: 0.04 K/UL — SIGNIFICANT CHANGE UP (ref 0–0.2)
BASOPHILS NFR BLD AUTO: 0.3 % — SIGNIFICANT CHANGE UP (ref 0–2)
BILIRUB SERPL-MCNC: 1.8 MG/DL — SIGNIFICANT CHANGE UP (ref 0.4–2)
BUN SERPL-MCNC: 20 MG/DL — SIGNIFICANT CHANGE UP (ref 8–20)
BUN SERPL-MCNC: 21 MG/DL — HIGH (ref 8–20)
CALCIUM SERPL-MCNC: 7.6 MG/DL — LOW (ref 8.6–10.2)
CALCIUM SERPL-MCNC: 7.9 MG/DL — LOW (ref 8.6–10.2)
CHLORIDE SERPL-SCNC: 101 MMOL/L — SIGNIFICANT CHANGE UP (ref 98–107)
CHLORIDE SERPL-SCNC: 101 MMOL/L — SIGNIFICANT CHANGE UP (ref 98–107)
CO2 SERPL-SCNC: 19 MMOL/L — LOW (ref 22–29)
CO2 SERPL-SCNC: 21 MMOL/L — LOW (ref 22–29)
CREAT SERPL-MCNC: 1.26 MG/DL — SIGNIFICANT CHANGE UP (ref 0.5–1.3)
CREAT SERPL-MCNC: 1.4 MG/DL — HIGH (ref 0.5–1.3)
EOSINOPHIL # BLD AUTO: 0.3 K/UL — SIGNIFICANT CHANGE UP (ref 0–0.5)
EOSINOPHIL NFR BLD AUTO: 2.1 % — SIGNIFICANT CHANGE UP (ref 0–6)
GLUCOSE BLDC GLUCOMTR-MCNC: 104 MG/DL — HIGH (ref 70–99)
GLUCOSE BLDC GLUCOMTR-MCNC: 105 MG/DL — HIGH (ref 70–99)
GLUCOSE BLDC GLUCOMTR-MCNC: 73 MG/DL — SIGNIFICANT CHANGE UP (ref 70–99)
GLUCOSE BLDC GLUCOMTR-MCNC: 83 MG/DL — SIGNIFICANT CHANGE UP (ref 70–99)
GLUCOSE BLDC GLUCOMTR-MCNC: 95 MG/DL — SIGNIFICANT CHANGE UP (ref 70–99)
GLUCOSE SERPL-MCNC: 102 MG/DL — HIGH (ref 70–99)
GLUCOSE SERPL-MCNC: 73 MG/DL — SIGNIFICANT CHANGE UP (ref 70–99)
HCT VFR BLD CALC: 29.6 % — LOW (ref 39–50)
HCT VFR BLD CALC: 31.5 % — LOW (ref 39–50)
HGB BLD-MCNC: 10.2 G/DL — LOW (ref 13–17)
HGB BLD-MCNC: 9.4 G/DL — LOW (ref 13–17)
IMM GRANULOCYTES NFR BLD AUTO: 0.8 % — SIGNIFICANT CHANGE UP (ref 0–1.5)
INR BLD: 2.18 RATIO — HIGH (ref 0.88–1.16)
INR BLD: 2.85 RATIO — HIGH (ref 0.88–1.16)
LYMPHOCYTES # BLD AUTO: 1.21 K/UL — SIGNIFICANT CHANGE UP (ref 1–3.3)
LYMPHOCYTES # BLD AUTO: 8.5 % — LOW (ref 13–44)
MAGNESIUM SERPL-MCNC: 2.5 MG/DL — SIGNIFICANT CHANGE UP (ref 1.6–2.6)
MAGNESIUM SERPL-MCNC: 2.6 MG/DL — SIGNIFICANT CHANGE UP (ref 1.6–2.6)
MCHC RBC-ENTMCNC: 30.2 PG — SIGNIFICANT CHANGE UP (ref 27–34)
MCHC RBC-ENTMCNC: 30.9 PG — SIGNIFICANT CHANGE UP (ref 27–34)
MCHC RBC-ENTMCNC: 31.8 GM/DL — LOW (ref 32–36)
MCHC RBC-ENTMCNC: 32.4 GM/DL — SIGNIFICANT CHANGE UP (ref 32–36)
MCV RBC AUTO: 95.2 FL — SIGNIFICANT CHANGE UP (ref 80–100)
MCV RBC AUTO: 95.5 FL — SIGNIFICANT CHANGE UP (ref 80–100)
MONOCYTES # BLD AUTO: 1.6 K/UL — HIGH (ref 0–0.9)
MONOCYTES NFR BLD AUTO: 11.3 % — SIGNIFICANT CHANGE UP (ref 2–14)
NEUTROPHILS # BLD AUTO: 10.94 K/UL — HIGH (ref 1.8–7.4)
NEUTROPHILS NFR BLD AUTO: 77 % — SIGNIFICANT CHANGE UP (ref 43–77)
PLATELET # BLD AUTO: 66 K/UL — LOW (ref 150–400)
PLATELET # BLD AUTO: 74 K/UL — LOW (ref 150–400)
POTASSIUM SERPL-MCNC: 4.8 MMOL/L — SIGNIFICANT CHANGE UP (ref 3.5–5.3)
POTASSIUM SERPL-MCNC: 4.9 MMOL/L — SIGNIFICANT CHANGE UP (ref 3.5–5.3)
POTASSIUM SERPL-SCNC: 4.8 MMOL/L — SIGNIFICANT CHANGE UP (ref 3.5–5.3)
POTASSIUM SERPL-SCNC: 4.9 MMOL/L — SIGNIFICANT CHANGE UP (ref 3.5–5.3)
PROT SERPL-MCNC: 5.3 G/DL — LOW (ref 6.6–8.7)
PROTHROM AB SERPL-ACNC: 24.4 SEC — HIGH (ref 10.6–13.6)
PROTHROM AB SERPL-ACNC: 31.5 SEC — HIGH (ref 10.6–13.6)
RBC # BLD: 3.11 M/UL — LOW (ref 4.2–5.8)
RBC # BLD: 3.3 M/UL — LOW (ref 4.2–5.8)
RBC # FLD: 14.2 % — SIGNIFICANT CHANGE UP (ref 10.3–14.5)
RBC # FLD: 14.4 % — SIGNIFICANT CHANGE UP (ref 10.3–14.5)
SODIUM SERPL-SCNC: 136 MMOL/L — SIGNIFICANT CHANGE UP (ref 135–145)
SODIUM SERPL-SCNC: 137 MMOL/L — SIGNIFICANT CHANGE UP (ref 135–145)
WBC # BLD: 14.2 K/UL — HIGH (ref 3.8–10.5)
WBC # BLD: 16.85 K/UL — HIGH (ref 3.8–10.5)
WBC # FLD AUTO: 14.2 K/UL — HIGH (ref 3.8–10.5)
WBC # FLD AUTO: 16.85 K/UL — HIGH (ref 3.8–10.5)

## 2020-11-18 PROCEDURE — 71045 X-RAY EXAM CHEST 1 VIEW: CPT | Mod: 26

## 2020-11-18 PROCEDURE — 90937 HEMODIALYSIS REPEATED EVAL: CPT

## 2020-11-18 PROCEDURE — 99223 1ST HOSP IP/OBS HIGH 75: CPT

## 2020-11-18 PROCEDURE — 93010 ELECTROCARDIOGRAM REPORT: CPT

## 2020-11-18 PROCEDURE — 99231 SBSQ HOSP IP/OBS SF/LOW 25: CPT | Mod: 24

## 2020-11-18 RX ORDER — ALBUMIN HUMAN 25 %
250 VIAL (ML) INTRAVENOUS ONCE
Refills: 0 | Status: COMPLETED | OUTPATIENT
Start: 2020-11-18 | End: 2020-11-18

## 2020-11-18 RX ORDER — QUETIAPINE FUMARATE 200 MG/1
25 TABLET, FILM COATED ORAL AT BEDTIME
Refills: 0 | Status: DISCONTINUED | OUTPATIENT
Start: 2020-11-18 | End: 2020-11-30

## 2020-11-18 RX ADMIN — ATORVASTATIN CALCIUM 40 MILLIGRAM(S): 80 TABLET, FILM COATED ORAL at 21:11

## 2020-11-18 RX ADMIN — PANTOPRAZOLE SODIUM 40 MILLIGRAM(S): 20 TABLET, DELAYED RELEASE ORAL at 17:26

## 2020-11-18 RX ADMIN — QUETIAPINE FUMARATE 25 MILLIGRAM(S): 200 TABLET, FILM COATED ORAL at 21:11

## 2020-11-18 RX ADMIN — OXYCODONE HYDROCHLORIDE 10 MILLIGRAM(S): 5 TABLET ORAL at 01:48

## 2020-11-18 RX ADMIN — Medication 5 MILLIGRAM(S): at 21:11

## 2020-11-18 RX ADMIN — LEVETIRACETAM 750 MILLIGRAM(S): 250 TABLET, FILM COATED ORAL at 05:23

## 2020-11-18 RX ADMIN — Medication 125 MILLILITER(S): at 09:15

## 2020-11-18 RX ADMIN — MILRINONE LACTATE 7.48 MICROGRAM(S)/KG/MIN: 1 INJECTION, SOLUTION INTRAVENOUS at 12:31

## 2020-11-18 RX ADMIN — OXYCODONE HYDROCHLORIDE 10 MILLIGRAM(S): 5 TABLET ORAL at 08:05

## 2020-11-18 RX ADMIN — PANTOPRAZOLE SODIUM 40 MILLIGRAM(S): 20 TABLET, DELAYED RELEASE ORAL at 05:23

## 2020-11-18 RX ADMIN — OXYCODONE HYDROCHLORIDE 10 MILLIGRAM(S): 5 TABLET ORAL at 15:50

## 2020-11-18 RX ADMIN — CHLORHEXIDINE GLUCONATE 1 APPLICATION(S): 213 SOLUTION TOPICAL at 12:31

## 2020-11-18 RX ADMIN — Medication 1 MILLIGRAM(S): at 12:31

## 2020-11-18 RX ADMIN — OXYCODONE HYDROCHLORIDE 10 MILLIGRAM(S): 5 TABLET ORAL at 08:35

## 2020-11-18 RX ADMIN — LEVETIRACETAM 750 MILLIGRAM(S): 250 TABLET, FILM COATED ORAL at 17:26

## 2020-11-18 RX ADMIN — OXYCODONE HYDROCHLORIDE 10 MILLIGRAM(S): 5 TABLET ORAL at 02:40

## 2020-11-18 RX ADMIN — OXYCODONE HYDROCHLORIDE 10 MILLIGRAM(S): 5 TABLET ORAL at 15:20

## 2020-11-18 RX ADMIN — Medication 81 MILLIGRAM(S): at 12:31

## 2020-11-18 NOTE — PROGRESS NOTE ADULT - SUBJECTIVE AND OBJECTIVE BOX
Patient was seen and evaluated on dialysis.   Patient is tolerating the procedure well.   T(C): 36.7 (20 @ 08:00), Max: 36.8 (20 @ 23:50)  HR: 80 (20 @ 08:30) (79 - 80)  Continue dialysis: CRRT    Summary of KDIGO Recommendation Statements: Dialysis Interventions for Treatment of SANTOS  5.1.1: Initiate RRT emergently when life-threatening changes in fluid, electrolyte, and acid-base balance exist. (Not Graded)  5.1.2: Consider the broader clinical context, the presence of conditions that can be modified with RRT, and trends of laboratory  tests—rather than single BUN and creatinine thresholds alone—when making the decision to start RRT. (Not Graded)  5.2.1: Discontinue RRT when it is no longer required, either because intrinsic kidney function has recovered to the point that it is  adequate to meet patient needs, or because RRT is no longer consistent with the goals of care. (Not Graded)  5.2.2: We suggest not using diuretics to enhance kidney function recovery, or to reduce the duration or frequency of RRT. (2B)  5.3.1: In a patient with SANTOS requiring RRT, base the decision to use anticoagulation for RRT on assessment of the patient’s potential  risks and benefits from anticoagulation (see Figure 17). (Not Graded)  5.3.1.1: We recommend using anticoagulation during RRT in SANTOS if a patient does not have an increased bleeding risk or  impaired coagulation and is not already receiving systemic anticoagulation. (1B)  5.3.2: For patients without an increased bleeding risk or impaired coagulation and not already receiving effective systemic  anticoagulation, we suggest the followin.3.2.1: For anticoagulation in intermittent RRT, we recommend using either unfractionated or low-molecular-weight heparin,  rather than other anticoagulants. (1C)  5.3.2.2: For anticoagulation in CRRT, we suggest using regional citrate anticoagulation rather than heparin in patients who do  not have contraindications for citrate. (2B)  5.3.2.3: For anticoagulation during CRRT in patients who have contraindications for citrate, we suggest using either  unfractionated or low-molecular-weight heparin, rather than other anticoagulants. (2C)  5.3.3: For patients with increased bleeding risk who are not receiving anticoagulation, we suggest the following for anticoagulation  during RRT:  5.3.3.1: We suggest using regional citrate anticoagulation, rather than no anticoagulation, during CRRT in a patient without  contraindications for citrate. (2C)  5.3.3.2: We suggest avoiding regional heparinization during CRRT in a patient with increased risk of bleeding. (2C)  5.3.4: In a patient with heparin-induced thrombocytopenia (HIT), all heparin must be stopped and we recommend using direct  thrombin inhibitors (such as argatroban) or Factor Xa inhibitors (such as danaparoid or fondaparinux) rather than other or no  anticoagulation during RRT. (1A)  5.3.4.1: In a patient with HIT who does not have severe liver failure, we suggest using argatroban rather than other thrombin  or Factor Xa inhibitors during RRT. (2C)  5.4.1: We suggest initiating RRT in patients with SANTOS via an uncuffed nontunneled dialysis catheter, rather than a tunneled catheter.(2D)  5.4.2: When choosing a vein for insertion of a dialysis catheter in patients with SANTOS, consider these preferences (Not Graded):  ? First choice: right jugular vein;  ? Second choice: femoral vein;  ? Third choice: left jugular vein;  ? Last choice: subclavian vein with preference for the dominant side.  5.4.3: We recommend using ultrasound guidance for dialysis catheter insertion. (1A)  5.4.4: We recommend obtaining a chest radiograph promptly after placement and before first use of an internal jugular or subclavian  dialysis catheter. (1B)  5.4.5: We suggest not using topical antibiotics over the skin insertion site of a nontunneled dialysis catheter in ICU patients with SANTOS  requiring RRT. (2C)  5.4.6: We suggest not using antibiotic locks for prevention of catheter-related infections of nontunneled dialysis catheters in SANTOS  requiring RRT. (2C)  5.5.1: We suggest to use dialyzers with a biocompatible membrane for IHD and CRRT in patients with SANTOS. (2C)  5.6.1: Use continuous and intermittent RRT as complementary therapies in SANTOS patients. (Not Graded)  5.6.2: We suggest using CRRT, rather than standard intermittent RRT, for hemodynamically unstable patients. (2B)  5.6.3: We suggest using CRRT, rather than intermittent RRT, for SANTOS patients with acute brain injury or other causes of increased  intracranial pressure or generalized brain edema. (2B)  5.7.1: We suggest using bicarbonate, rather than lactate, as a buffer in dialysate and replacement fluid for RRT in patients with SANTOS. (2C)  5.7.2: We recommend using bicarbonate, rather than lactate, as a buffer in dialysate and replacement fluid for RRT in patients with  SANTOS and circulatory shock. (1B)  5.7.3: We suggest using bicarbonate, rather than lactate, as a buffer in dialysate and replacement fluid for RRT in patients with SANTOS  and liver failure and/or lactic acidemia. (2B)  5.7.4: We recommend that dialysis fluids and replacement fluids in patients with SANTOS, at a minimum, comply with American  Association of Medical Instrumentation (AAMI) standards regarding contamination with bacteria and endotoxins. (1B)  5.8.1: The dose of RRT to be delivered should be prescribed before starting each session of RRT. (Not Graded) We recommend  frequent assessment of the actual delivered dose in order to adjust the prescription. (1B)  5.8.2: Provide RRT to achieve the goals of electrolyte, acid-base, solute, and fluid balance that will meet the patient’s needs. (Not  Graded)  5.8.3: We recommend delivering a Kt/V of 3.9 per week when using intermittent or extended RRT in SANTOS. (1A)  5.8.4: We recommend delivering an effluent volume of 20-25 mL/kg/h for CRRT in SANTOS (1A). This will usually require a higher  prescription of effluent volume. (Not Graded)  Abbreviations: SANTOS, acute kidney injury; BUN, blood urea nitrogen; CRRT, continuous renal replacement therapy; ICU, intensive  care unit; RRT, renal replacement therapy.  Reproduced with permission of KDIGO from the KDIGO Clinical Practice Guideline for Acute Kidney Injury.    QB: 300 ml.,        QD: 1500ml.,/ Hour,   Goal UF 75 ml.,  over 1 Hour,    Wt: 112.8 Kg.,    In summary, JONATHAN GIBSON is a 60y Male with past medical history significant for HTN, HLD, CAD, ascending aortic dissection s/p repair (), chronic diastolic HFpEF, moderate valvular heart disease including moderate aortic and mitral regurgitation, admitted with CP/SOB/elevated troponin.    Per Cardiology :     - Echocardiogram 20 EF 50-55%, grade II diastolic dysfunction, mildly reduced RV systolic function, severe LAE, mild KOLTON, mod-severe MR and AI, mod TR, dilated aortic root @ 4.2cm  - Cardiac cath 11/10/20: severe 2 vessel CAD.  Chronically occluded proximal RCA with collaterals from the LCA.  EF 50%.  Elevated LVEDP.  - s/p Re - Surgery :  AVR 25mm CE [id number], MVR 31mm Magna Ease, CABG x 2 (DGV-OM, RPDA) - Dr. Butler 20  - Now off of nitric oxide and extubated  - Remains on dopamine and primacor  - AV pacing on telemetry with underlying junctional rhythm in 50s.  Will monitor for recovery, by may need PPM prior to discharge.    - CVVHDF in place.  Continue for fluid removal.  - Continue ASA 81 and Lipitor 40 mg.,   - Glycemic control as per CTS  - On Keppra for seizures  - D/W  Dr. Butler, [facility] PA and RN    Now Negative 75 ml/hour.    To Continue CVVHDF until AM,

## 2020-11-18 NOTE — PROGRESS NOTE ADULT - SUBJECTIVE AND OBJECTIVE BOX
Significant recent/past 24 hr events:  No issues overnight, continued to wean primacor off   Patient is making minimal to no urine  CVVH circuit did clot but H/H is stable     Subjective:    Review of Systems  no complaints at this time     Patient is a 60y old  Male who presents with a chief complaint of sob (17 Nov 2020 09:49)    HPI:  Patient is a 60 yoM with PMH of CAD s/p stent in LAD, leaky aortic valve, uncontrolled bp, thoracic aortic dissection requiring emergent surgery, CVA with left sided sensory loss, colostomy for bowel ischemia s/p reversal, psoriasis on Humira injections presents from home with sob and le edema. Patient states the past several weeks, he has not been taking his medications because he forgot. Patient states the sob has gotten worse over the past few days and also associated with chest pressure. Patient also hasnt seen his cardiologist in several months. Patient worked up in the er and had an elevated bnp and htn urgency and eric. Patient seen at bedside and states feeling a little better.  (02 Nov 2020 14:32)    PAST MEDICAL & SURGICAL HISTORY:  CVA (cerebral vascular accident)    CHF (congestive heart failure)    H/O colectomy    Aorta disorder      FAMILY HISTORY:  FH: hypertension        Vitals   ICU Vital Signs Last 24 Hrs  T(C): 37 (18 Nov 2020 00:45), Max: 37 (18 Nov 2020 00:45)  T(F): 98.6 (18 Nov 2020 00:45), Max: 98.6 (18 Nov 2020 00:45)  HR: 80 (18 Nov 2020 02:00) (80 - 80)  BP: --  BP(mean): --  ABP: 148/56 (18 Nov 2020 02:00) (97/65 - 193/76)  ABP(mean): 76 (18 Nov 2020 02:00) (64 - 103)  RR: 24 (18 Nov 2020 02:00) (13 - 39)  SpO2: 96% (18 Nov 2020 02:00) (90% - 98%)      VENT SETTINGS     ABG - ( 16 Nov 2020 21:58 )  pH, Arterial: 7.37  pH, Blood: x     /  pCO2: 41    /  pO2: 99    / HCO3: 23    / Base Excess: -1.6  /  SaO2: 98                  I&O's Detail    16 Nov 2020 07:01  -  17 Nov 2020 07:00  --------------------------------------------------------  IN:    EPINEPHrine: 103 mL    IV PiggyBack: 250 mL    Milrinone: 112.5 mL    Oral Fluid: 100 mL    sodium chloride 0.9%: 495 mL  Total IN: 1060.5 mL    OUT:    Chest Tube (mL): 720 mL    Chest Tube (mL): 20 mL    Chest Tube (mL): 420 mL    Dexmedetomidine: 0 mL    Indwelling Catheter - Urethral (mL): 275 mL    Insulin: 0 mL    Norepinephrine: 0 mL    Other (mL): 1464 mL    Propofol: 0 mL    Vasopressin: 0 mL  Total OUT: 2899 mL    Total NET: -1838.5 mL      17 Nov 2020 07:01  -  18 Nov 2020 02:35  --------------------------------------------------------  IN:    DOPamine Infusion: 57 mL    Milrinone: 145.9 mL    Oral Fluid: 587 mL  Total IN: 789.9 mL    OUT:    Chest Tube (mL): 310 mL    Chest Tube (mL): 30 mL    Chest Tube (mL): 170 mL    Other (mL): 2175 mL  Total OUT: 2685 mL    Total NET: -1895.1 mL          LABS                        9.4    13.75 )-----------( 74       ( 17 Nov 2020 22:14 )             29.5     11-17    135  |  102  |  22.0<H>  ----------------------------<  87  4.7   |  21.0<L>  |  1.28    Ca    7.8<L>      17 Nov 2020 09:30  Phos  7.0     11-16  Mg     2.4     11-17    TPro  5.4<L>  /  Alb  3.3  /  TBili  1.6  /  DBili  x   /  AST  33  /  ALT  20  /  AlkPhos  50  11-17    PT/INR - ( 17 Nov 2020 03:07 )   PT: 14.2 sec;   INR: 1.24 ratio         PTT - ( 17 Nov 2020 03:07 )  PTT:28.5 sec          POCT Blood Glucose.: 86 mg/dL (11-17-20 @ 22:39)  POCT Blood Glucose.: 96 mg/dL (11-17-20 @ 05:56)  POCT Blood Glucose.: 82 mg/dL (11-17-20 @ 03:05)        MEDICATIONS  (STANDING):  aspirin enteric coated 81 milliGRAM(s) Oral daily  atorvastatin 40 milliGRAM(s) Oral at bedtime  chlorhexidine 2% Cloths 1 Application(s) Topical daily  CRRT Treatment    <Continuous>  dextrose 40% Gel 15 Gram(s) Oral once  dextrose 5%. 1000 milliLiter(s) (50 mL/Hr) IV Continuous <Continuous>  dextrose 5%. 1000 milliLiter(s) (100 mL/Hr) IV Continuous <Continuous>  dextrose 50% Injectable 50 milliLiter(s) IV Push every 15 minutes  dextrose 50% Injectable 25 milliLiter(s) IV Push every 15 minutes  folic acid 1 milliGRAM(s) Oral daily  glucagon  Injectable 1 milliGRAM(s) IntraMuscular once  insulin lispro (ADMELOG) corrective regimen sliding scale   SubCutaneous Before meals and at bedtime  levETIRAcetam 750 milliGRAM(s) Oral two times a day  melatonin 5 milliGRAM(s) Oral at bedtime  milrinone Infusion 0.25 MICROgram(s)/kG/Min (7.48 mL/Hr) IV Continuous <Continuous>  pantoprazole  Injectable 40 milliGRAM(s) IV Push every 12 hours  Phoxillum Filtration BK 4 / 2.5 5000 milliLiter(s) (1000 mL/Hr) CRRT <Continuous>  Phoxillum Filtration BK 4 / 2.5 5000 milliLiter(s) (1500 mL/Hr) CRRT <Continuous>  Phoxillum Filtration BK 4 / 2.5 5000 milliLiter(s) (1500 mL/Hr) CRRT <Continuous>  sodium chloride 0.9%. 1000 milliLiter(s) (10 mL/Hr) IV Continuous <Continuous>    MEDICATIONS  (PRN):  benzocaine 15 mG/menthol 3.6 mG (Sugar-Free) Lozenge 1 Lozenge Oral three times a day PRN Sore Throat  lidocaine   Patch 1 Patch Transdermal every 24 hours PRN as needed  oxyCODONE    IR 5 milliGRAM(s) Oral every 4 hours PRN Moderate Pain (4 - 6)  oxyCODONE    IR 10 milliGRAM(s) Oral every 4 hours PRN Severe Pain (7 - 10)    Allergies:  penicillin (Unknown)      Physical Exam:   Constitutional: NAD, well-groomed, well-developed  HEENT: PERRLA, EOMI, no drainage or redness  Neck: supple,  No JVD  Respiratory: Breath Sounds equal & clear bilaterally to auscultation, no rales/rhonchi/wheezing, no accessory muscle use noted  Cardiovascular: Regular rate, regular rhythm, normal S1, S2; no murmurs or rub  Gastrointestinal: Soft, non-tender, non distended, + bowel sounds  Extremities: CORDOVA x 4, no peripheral edema, no cyanosis, no clubbing   Neurological: A+O x 3; speech clear and intact; no sensory, motor  deficits, normal reflexes  Skin: warm, dry, well perfused      Code Status:       Critical care time spent: ____minutes (reviewing chart including medication, labs and imaging results, discussions with interdisciplinary team, discussing goals of care/advanced directives, counseling patient and/or family, non-inclusive of procedures)    Case including assessment/plan of care discussed with                    [facility] attending.   Significant recent/past 24 hr events:  No issues overnight, continued to wean primacor off   Patient is making minimal to no urine  CVVH circuit did clot but H/H is stable     Subjective:    Review of Systems  no complaints at this time     Patient is a 60y old  Male who presents with a chief complaint of sob (17 Nov 2020 09:49)    HPI:  Patient is a 60 yoM with PMH of CAD s/p stent in LAD, leaky aortic valve, uncontrolled bp, thoracic aortic dissection requiring emergent surgery, CVA with left sided sensory loss, colostomy for bowel ischemia s/p reversal, psoriasis on Humira injections presents from home with sob and le edema. Patient states the past several weeks, he has not been taking his medications because he forgot. Patient states the sob has gotten worse over the past few days and also associated with chest pressure. Patient also hasnt seen his cardiologist in several months. Patient worked up in the er and had an elevated bnp and htn urgency and eric. Patient seen at bedside and states feeling a little better.  (02 Nov 2020 14:32)    PAST MEDICAL & SURGICAL HISTORY:  CVA (cerebral vascular accident)    CHF (congestive heart failure)    H/O colectomy    Aorta disorder      FAMILY HISTORY:  FH: hypertension        Vitals   ICU Vital Signs Last 24 Hrs  T(C): 37 (18 Nov 2020 00:45), Max: 37 (18 Nov 2020 00:45)  T(F): 98.6 (18 Nov 2020 00:45), Max: 98.6 (18 Nov 2020 00:45)  HR: 80 (18 Nov 2020 02:00) (80 - 80)  BP: --  BP(mean): --  ABP: 148/56 (18 Nov 2020 02:00) (97/65 - 193/76)  ABP(mean): 76 (18 Nov 2020 02:00) (64 - 103)  RR: 24 (18 Nov 2020 02:00) (13 - 39)  SpO2: 96% (18 Nov 2020 02:00) (90% - 98%)      VENT SETTINGS     ABG - ( 16 Nov 2020 21:58 )  pH, Arterial: 7.37  pH, Blood: x     /  pCO2: 41    /  pO2: 99    / HCO3: 23    / Base Excess: -1.6  /  SaO2: 98                  I&O's Detail    16 Nov 2020 07:01  -  17 Nov 2020 07:00  --------------------------------------------------------  IN:    EPINEPHrine: 103 mL    IV PiggyBack: 250 mL    Milrinone: 112.5 mL    Oral Fluid: 100 mL    sodium chloride 0.9%: 495 mL  Total IN: 1060.5 mL    OUT:    Chest Tube (mL): 720 mL    Chest Tube (mL): 20 mL    Chest Tube (mL): 420 mL    Dexmedetomidine: 0 mL    Indwelling Catheter - Urethral (mL): 275 mL    Insulin: 0 mL    Norepinephrine: 0 mL    Other (mL): 1464 mL    Propofol: 0 mL    Vasopressin: 0 mL  Total OUT: 2899 mL    Total NET: -1838.5 mL      17 Nov 2020 07:01  -  18 Nov 2020 02:35  --------------------------------------------------------  IN:    DOPamine Infusion: 57 mL    Milrinone: 145.9 mL    Oral Fluid: 587 mL  Total IN: 789.9 mL    OUT:    Chest Tube (mL): 310 mL    Chest Tube (mL): 30 mL    Chest Tube (mL): 170 mL    Other (mL): 2175 mL  Total OUT: 2685 mL    Total NET: -1895.1 mL          LABS                        9.4    13.75 )-----------( 74       ( 17 Nov 2020 22:14 )             29.5     11-17    135  |  102  |  22.0<H>  ----------------------------<  87  4.7   |  21.0<L>  |  1.28    Ca    7.8<L>      17 Nov 2020 09:30  Phos  7.0     11-16  Mg     2.4     11-17    TPro  5.4<L>  /  Alb  3.3  /  TBili  1.6  /  DBili  x   /  AST  33  /  ALT  20  /  AlkPhos  50  11-17    PT/INR - ( 17 Nov 2020 03:07 )   PT: 14.2 sec;   INR: 1.24 ratio         PTT - ( 17 Nov 2020 03:07 )  PTT:28.5 sec          POCT Blood Glucose.: 86 mg/dL (11-17-20 @ 22:39)  POCT Blood Glucose.: 96 mg/dL (11-17-20 @ 05:56)  POCT Blood Glucose.: 82 mg/dL (11-17-20 @ 03:05)        MEDICATIONS  (STANDING):  aspirin enteric coated 81 milliGRAM(s) Oral daily  atorvastatin 40 milliGRAM(s) Oral at bedtime  chlorhexidine 2% Cloths 1 Application(s) Topical daily  CRRT Treatment    <Continuous>  dextrose 40% Gel 15 Gram(s) Oral once  dextrose 5%. 1000 milliLiter(s) (50 mL/Hr) IV Continuous <Continuous>  dextrose 5%. 1000 milliLiter(s) (100 mL/Hr) IV Continuous <Continuous>  dextrose 50% Injectable 50 milliLiter(s) IV Push every 15 minutes  dextrose 50% Injectable 25 milliLiter(s) IV Push every 15 minutes  folic acid 1 milliGRAM(s) Oral daily  glucagon  Injectable 1 milliGRAM(s) IntraMuscular once  insulin lispro (ADMELOG) corrective regimen sliding scale   SubCutaneous Before meals and at bedtime  levETIRAcetam 750 milliGRAM(s) Oral two times a day  melatonin 5 milliGRAM(s) Oral at bedtime  milrinone Infusion 0.25 MICROgram(s)/kG/Min (7.48 mL/Hr) IV Continuous <Continuous>  pantoprazole  Injectable 40 milliGRAM(s) IV Push every 12 hours  Phoxillum Filtration BK 4 / 2.5 5000 milliLiter(s) (1000 mL/Hr) CRRT <Continuous>  Phoxillum Filtration BK 4 / 2.5 5000 milliLiter(s) (1500 mL/Hr) CRRT <Continuous>  Phoxillum Filtration BK 4 / 2.5 5000 milliLiter(s) (1500 mL/Hr) CRRT <Continuous>  sodium chloride 0.9%. 1000 milliLiter(s) (10 mL/Hr) IV Continuous <Continuous>    MEDICATIONS  (PRN):  benzocaine 15 mG/menthol 3.6 mG (Sugar-Free) Lozenge 1 Lozenge Oral three times a day PRN Sore Throat  lidocaine   Patch 1 Patch Transdermal every 24 hours PRN as needed  oxyCODONE    IR 5 milliGRAM(s) Oral every 4 hours PRN Moderate Pain (4 - 6)  oxyCODONE    IR 10 milliGRAM(s) Oral every 4 hours PRN Severe Pain (7 - 10)    Allergies:  penicillin (Unknown)      Physical Exam:   Constitutional: NAD, well-groomed, well-developed  HEENT: PERRLA, EOMI, no drainage or redness  Neck: supple,  No JVD  Respiratory: Breath Sounds equal & clear bilaterally to auscultation, no rales/rhonchi/wheezing, no accessory muscle use noted  Cardiovascular: Regular rate, regular rhythm, normal S1, S2; no murmurs or rub  Gastrointestinal: Soft, non-tender, non distended, + bowel sounds  Extremities: CORDOVA x 4, no peripheral edema, no cyanosis, no clubbing   Neurological: A+O x 3; speech clear and intact; no sensory, motor  deficits, normal reflexes  Skin: warm, dry, well perfused

## 2020-11-18 NOTE — PROGRESS NOTE ADULT - SUBJECTIVE AND OBJECTIVE BOX
Brief PA Note     Vascular surgery Follow up    Patient known to consult service , initial vascular consultation on 11/6  Patient currently with temporary HD catheter, but will eventually equire a permacath  Patient currently residing in the [facility]  - Will place permacath once patient is downgraded and transferred out of the Unit  - Continue the utilization of temporary access, change sites or  perform catheter changes over wire per hospital protocol while in the unit   d/w attending

## 2020-11-18 NOTE — CONSULT NOTE ADULT - SUBJECTIVE AND OBJECTIVE BOX
60yM was admitted on 11-02 with SOB and noncompliant medical care. Workup showed EF 55%, moderate severe MR, Moderate TR, mod-sever AI, grade II diastolic dysfunction. He was also found to have SANTOS/CKD receiving HD via right IJ permacath. Course complicated by anemia and s/p EGD and Coloscopy. He had cath on 11/10. On 11/13 he is s/p reoperation of AVR and MVR as well as CABGx2. Post-op required multiple pressors and blood products. He was extubated 11/16.    Imaging reviewed showed:  CTA NECK/BRAIN 11/12 - 70% stenosis in the right carotid bulb. Aortic arch dissection extending into the great vessels, as above. Motion artifacts limits evaluation of the left common carotid artery.    CXR 11/16 - Retrocardiac opacity, likely atelectasis.    Patient is very fatigued this AM. Limited participation. Reports no pain at this time.     REVIEW OF SYSTEMS  Constitutional - No fever, No weight loss, +fatigue  HEENT - No eye pain, No visual disturbances, No difficulty hearing, No tinnitus, No vertigo, No neck pain  Respiratory - No cough, No wheezing, +shortness of breath  Cardiovascular - No chest pain, No palpitations  Gastrointestinal - No abdominal pain, No nausea, No vomiting, No diarrhea, No constipation  Genitourinary - No dysuria, No frequency, No hematuria, No incontinence  Neurological - No headaches, No memory loss, +loss of strength, +numbness, No tremors  Skin - No itching, No rashes, No lesions   Endocrine - No temperature intolerance  Musculoskeletal - No joint pain, No joint swelling, +muscle pain  Psychiatric - +depression, +anxiety    VITALS  T(C): 36.4 (11-18-20 @ 07:00), Max: 37 (11-18-20 @ 00:45)  HR: 80 (11-18-20 @ 11:00) (80 - 80)  BP: 90/50 (11-18-20 @ 11:00) (90/50 - 112/63)  RR: 12 (11-18-20 @ 11:00) (11 - 24)  SpO2: 91% (11-18-20 @ 11:00) (91% - 98%)  Wt(kg): --    PAST MEDICAL & SURGICAL HISTORY  CVA (cerebral vascular accident)    CHF (congestive heart failure)    H/O colectomy    Aorta disorder        SOCIAL HISTORY - as per documentation/history  Smoking - None  EtOH - None  Drugs - None    FUNCTIONAL HISTORY  Lives alone, 5 VESTA  Independent    CURRENT FUNCTIONAL STATUS  11/17  Bed Mobility: Rolling/Turning:     · Level of Knott	dependent (less than 25% patients effort)  · Physical Assist/Nonphysical Assist	2 person assist    Bed Mobility: Supine to Sit:     · Level of Knott	unable to perform; held for safety as per RN, therex within bed: cheyenne elbow flexion/extension/ cheyenne wrist flexion/extension, quad sets, glute sets, cheyenne ankle pumps,      FAMILY HISTORY   FH: hypertension        RECENT LABS - Reviewed  CBC Full  -  ( 18 Nov 2020 03:08 )  WBC Count : 14.20 K/uL  RBC Count : 3.11 M/uL  Hemoglobin : 9.4 g/dL  Hematocrit : 29.6 %  Platelet Count - Automated : 66 K/uL  Mean Cell Volume : 95.2 fl  Mean Cell Hemoglobin : 30.2 pg  Mean Cell Hemoglobin Concentration : 31.8 gm/dL  Auto Neutrophil # : 10.94 K/uL  Auto Lymphocyte # : 1.21 K/uL  Auto Monocyte # : 1.60 K/uL  Auto Eosinophil # : 0.30 K/uL  Auto Basophil # : 0.04 K/uL  Auto Neutrophil % : 77.0 %  Auto Lymphocyte % : 8.5 %  Auto Monocyte % : 11.3 %  Auto Eosinophil % : 2.1 %  Auto Basophil % : 0.3 %    11-18    137  |  101  |  21.0<H>  ----------------------------<  73  4.9   |  19.0<L>  |  1.40<H>    Ca    7.6<L>      18 Nov 2020 03:08  Phos  7.0     11-16  Mg     2.5     11-18    TPro  5.3<L>  /  Alb  3.2<L>  /  TBili  1.8  /  DBili  x   /  AST  31  /  ALT  20  /  AlkPhos  53  11-18        ALLERGIES  penicillin (Unknown)      MEDICATIONS   aspirin enteric coated 81 milliGRAM(s) Oral daily  atorvastatin 40 milliGRAM(s) Oral at bedtime  benzocaine 15 mG/menthol 3.6 mG (Sugar-Free) Lozenge 1 Lozenge Oral three times a day PRN  chlorhexidine 2% Cloths 1 Application(s) Topical daily  CRRT Treatment    <Continuous>  folic acid 1 milliGRAM(s) Oral daily  insulin lispro (ADMELOG) corrective regimen sliding scale   SubCutaneous Before meals and at bedtime  levETIRAcetam 750 milliGRAM(s) Oral two times a day  lidocaine   Patch 1 Patch Transdermal every 24 hours PRN  melatonin 5 milliGRAM(s) Oral at bedtime  milrinone Infusion 0.25 MICROgram(s)/kG/Min IV Continuous <Continuous>  oxyCODONE    IR 5 milliGRAM(s) Oral every 4 hours PRN  oxyCODONE    IR 10 milliGRAM(s) Oral every 4 hours PRN  pantoprazole  Injectable 40 milliGRAM(s) IV Push every 12 hours  Phoxillum Filtration BK 4 / 2.5 5000 milliLiter(s) CRRT <Continuous>  Phoxillum Filtration BK 4 / 2.5 5000 milliLiter(s) CRRT <Continuous>  Phoxillum Filtration BK 4 / 2.5 5000 milliLiter(s) CRRT <Continuous>  QUEtiapine 25 milliGRAM(s) Oral at bedtime  sodium chloride 0.9%. 1000 milliLiter(s) IV Continuous <Continuous>      ----------------------------------------------------------------------------------------  PHYSICAL EXAM  Constitutional - NAD, Uncomfortable  HEENT - NCAT, EOMI  Neck - Supple, No limited ROM  Chest - Breathing comfortably, No wheezing  Cardiovascular - S1S2   Abdomen - Soft   Extremities - Diffuse edema, BLE PRAFO  Neurologic Exam -                    Cognitive - Awake, Alert, AAO to self, situation     Communication - Fluent, +dysarthria     Motor -  Diffuse weakness - limited by participation                     LEFT    UE - ShAB 0/5, EF 0/5, EE 0/5,  3/5                    RIGHT UE - ShAB 1/5, EF 3/5, EE 2/5,  4/5                    LEFT    LE - HF 0/5, KE 0/5, DF 1/5                     RIGHT LE - HF 0/5, KE 0/5, DF 1/5      Sensory - Decreased to the left      Coordination - FTN impaired  Psychiatric - Mood depressed   ----------------------------------------------------------------------------------------  ASSESSMENT/PLAN  60yMale with functional deficits after debility related to severe MR and AI  MR/AR s/p AVR/MVR   CAD s/p CABGx2, Old CVA - ASA, LIpitor  Pain - Tylenol, Lidoderm, Oxycodone, Recommend Neurontin 400mg HS (left sided neuropathy)  Sleep - Melatonin  Mood - Seroquel  Seizures - Keppra  DVT PPX - SCDs  Rehab - Recommend SEAN, patient DOES NOT meet acute inpatient rehabilitation criteria. Patient needs a more prolonged stay to achieve transition to community. Will continue to follow. Functional progress will determine ongoing rehab dispo recommendations, which may change.    Continue bedside therapy as well as OOB throughout the day with mobilization by staff to maintain cardiopulmonary function and prevention of secondary complications related to debility.     Discussed with rehab team and CM.

## 2020-11-18 NOTE — PROGRESS NOTE ADULT - PROBLEM SELECTOR PLAN 1
Remains intubated and sedated. S/p multiple blood products including 3 PRBC post-operatively- since with stable h and h - will continue to trend      Embudo-rodrigo catheter in place, monitoring Cardiac index hourly and continuous SVo2. Index stable >2 with inotropic support. Decreased RV function - epinephrine, inhaled NO. Fluid responsive. Lactate downtrended to normal.     Currently AV paced at 80, underlying junctional in 50s.   Wean epi, continue primacor Remains intubated and sedated. S/p multiple blood products including 3 PRBC post-operatively- since with stable h and h - will continue to trend   Currently AV paced at 80, underlying junctional in 50s.   Wean primacor

## 2020-11-18 NOTE — PROGRESS NOTE ADULT - SUBJECTIVE AND OBJECTIVE BOX
[facility]                                          [address] , [address], [place] [address]                                               PHONE: [phone]    FAX: [phone] [address], [address], [place] [address]                                       PHONE: [phone]    FAX: [phone]  *******************************************************************************    Overnight events/Subjective Assessment: No acute events overnight.  No CP or SOB.  Continued fluid removal with CVVH, Remains on minimal milrinone.     INTERPRETATION OF TELEMETRY (personally reviewed): AV pacing at 80bpm, underlying rhythm is junctional at 50bpm vs ventricular standstill.     penicillin (Unknown)    MEDICATIONS  (STANDING):  albumin human  5% IVPB 250 milliLiter(s) IV Intermittent once  aspirin enteric coated 81 milliGRAM(s) Oral daily  atorvastatin 40 milliGRAM(s) Oral at bedtime  chlorhexidine 2% Cloths 1 Application(s) Topical daily  CRRT Treatment    <Continuous>  dextrose 50% Injectable 50 milliLiter(s) IV Push every 15 minutes  dextrose 50% Injectable 25 milliLiter(s) IV Push every 15 minutes  DOPamine Infusion 2 MICROgram(s)/kG/Min (7.48 mL/Hr) IV Continuous <Continuous>  folic acid 1 milliGRAM(s) Oral daily  levETIRAcetam 750 milliGRAM(s) Oral two times a day  melatonin 5 milliGRAM(s) Oral at bedtime  milrinone Infusion 0.25 MICROgram(s)/kG/Min (7.48 mL/Hr) IV Continuous <Continuous>  pantoprazole  Injectable 40 milliGRAM(s) IV Push every 12 hours  Phoxillum Filtration BK 4 / 2.5 5000 milliLiter(s) (1000 mL/Hr) CRRT <Continuous>  Phoxillum Filtration BK 4 / 2.5 5000 milliLiter(s) (1500 mL/Hr) CRRT <Continuous>  Phoxillum Filtration BK 4 / 2.5 5000 milliLiter(s) (1500 mL/Hr) CRRT <Continuous>  sodium chloride 0.9%. 1000 milliLiter(s) (10 mL/Hr) IV Continuous <Continuous>  warfarin 5 milliGRAM(s) Oral once    MEDICATIONS  (PRN):  benzocaine 15 mG/menthol 3.6 mG (Sugar-Free) Lozenge 1 Lozenge Oral three times a day PRN Sore Throat  oxyCODONE    IR 5 milliGRAM(s) Oral every 4 hours PRN Moderate Pain (4 - 6)  oxyCODONE    IR 10 milliGRAM(s) Oral every 4 hours PRN Severe Pain (7 - 10)    ICU Vital Signs Last 24 Hrs  T(C): 36.4 (18 Nov 2020 07:00), Max: 37 (18 Nov 2020 00:45)  T(F): 97.6 (18 Nov 2020 07:00), Max: 98.6 (18 Nov 2020 00:45)  HR: 80 (18 Nov 2020 11:00) (80 - 80)  BP: 90/50 (18 Nov 2020 11:00) (90/50 - 112/63)  BP(mean): 62 (18 Nov 2020 11:00) (62 - 79)  ABP: 115/55 (18 Nov 2020 11:00) (103/46 - 152/60)  ABP(mean): 69 (18 Nov 2020 11:00) (59 - 81)  RR: 12 (18 Nov 2020 11:00) (11 - 24)  SpO2: 91% (18 Nov 2020 11:00) (91% - 98%)      PHYSICAL EXAM:  General: Appears well developed, well nourished, no acute distress  HEENT: Head: normocephalic, atraumatic, neck lines in place  Eyes: Pupils equal and reactive  Neck: Supple, no carotid bruit, no JVD,  CARDIOVASCULAR: Normal S1 and S2, 1/6 ALLIE  CHEST: clean sternotomy dressing  LUNGS: Decreased at bases, otherwise clear to auscultation bilaterally, no rales, rhonchi or wheeze  ABDOMEN: Soft, nontender, non-distended, positive bowel sounds, no mass or bruit  EXTREMITIES: diffuse edema, distal pulses WNL  SKIN: Warm and dry with normal turgor  NEURO: Alert & oriented x 3, grossly intact, lethargic  PSYCH: seemingly normal mood and affect      LABS:                                   9.4    14.20 )-----------( 66       ( 18 Nov 2020 03:08 )             29.6       11-18    137  |  101  |  21.0<H>  ----------------------------<  73  4.9   |  19.0<L>  |  1.40<H>    Ca    7.6<L>      18 Nov 2020 03:08  Phos  7.0     11-16  Mg     2.5     11-18    TPro  5.3<L>  /  Alb  3.2<L>  /  TBili  1.8  /  DBili  x   /  AST  31  /  ALT  20  /  AlkPhos  53  11-18      TPro  5.4<L>  /  Alb  3.3  /  TBili  1.6  /  DBili  x   /  AST  33  /  ALT  20  /  AlkPhos  50  11-17        PT/INR - ( 17 Nov 2020 03:07 )   PT: 14.2 sec;   INR: 1.24 ratio         PTT - ( 17 Nov 2020 03:07 )  PTT:28.5 sec  serum  Lipids:     Thyroid Stimulating Hormone, Serum: 1.95 uIU/mL (11-03 @ 08:18)      ASSESSMENT AND PLAN:  In summary, JONATHAN GIBSON is a 60y Male with past medical history significant for HTN, HLD, CAD, ascending aortic dissection s/p repair (2001), chronic diastolic HFpEF, moderate valvular heart disease including moderate aortic and mitral regurgitation, admitted with CP/SOB/elevated troponin.    - Echocardiogram 11/4/20 EF 50-55%, grade II diastolic dysfunction, mildly reduced RV systolic function, severe LAE, mild KOLTON, mod-severe MR and AI, mod TR, dilated aortic root @ 4.2cm  - Cardiac cath 11/10/20: severe 2 vessel CAD.  Chronically occluded proximal RCA with collaterals from the LCA.  EF 50%.  Elevated LVEDP.  - s/p reop AVR 25mm CE [id number], MVR 31mm Magna Ease, CABGx2 (DGV-OM, RPDA) with Dr. Butler 11/13/20  - Now off of nitric oxide and extubated  - Remains on low dose milrinone  - AV pacing on telemetry with underlying junctional rhythm in 50s with intermittent ventricular standstill. Will likely need PPM prior to discharge.  Will try for Friday (11/20/2020).   Dr. Raygoza (EP) aware.  - CVVH in place.  Continue for fluid removal.  - Continue ASA 81 and Lipitor 40.  - AC as per CTS  - Glycemic control as per CTS  - Pain management as per CTS  - Tube and line management as per CTS  - Continue Keppra for seizures  - d/w Dr. Butler, CTICU PA and RN

## 2020-11-19 LAB
ANION GAP SERPL CALC-SCNC: 14 MMOL/L — SIGNIFICANT CHANGE UP (ref 5–17)
APTT BLD: 31.2 SEC — SIGNIFICANT CHANGE UP (ref 27.5–35.5)
APTT BLD: 36.1 SEC — HIGH (ref 27.5–35.5)
BUN SERPL-MCNC: 21 MG/DL — HIGH (ref 8–20)
CALCIUM SERPL-MCNC: 7.8 MG/DL — LOW (ref 8.6–10.2)
CHLORIDE SERPL-SCNC: 100 MMOL/L — SIGNIFICANT CHANGE UP (ref 98–107)
CO2 SERPL-SCNC: 21 MMOL/L — LOW (ref 22–29)
CREAT SERPL-MCNC: 1.28 MG/DL — SIGNIFICANT CHANGE UP (ref 0.5–1.3)
GLUCOSE BLDC GLUCOMTR-MCNC: 105 MG/DL — HIGH (ref 70–99)
GLUCOSE BLDC GLUCOMTR-MCNC: 115 MG/DL — HIGH (ref 70–99)
GLUCOSE BLDC GLUCOMTR-MCNC: 118 MG/DL — HIGH (ref 70–99)
GLUCOSE BLDC GLUCOMTR-MCNC: 95 MG/DL — SIGNIFICANT CHANGE UP (ref 70–99)
GLUCOSE SERPL-MCNC: 90 MG/DL — SIGNIFICANT CHANGE UP (ref 70–99)
HCT VFR BLD CALC: 29.3 % — LOW (ref 39–50)
HGB BLD-MCNC: 9.1 G/DL — LOW (ref 13–17)
INR BLD: 2.13 RATIO — HIGH (ref 0.88–1.16)
INR BLD: 3 RATIO — HIGH (ref 0.88–1.16)
INR BLD: 3.86 RATIO — HIGH (ref 0.88–1.16)
MAGNESIUM SERPL-MCNC: 2.6 MG/DL — SIGNIFICANT CHANGE UP (ref 1.6–2.6)
MCHC RBC-ENTMCNC: 29.7 PG — SIGNIFICANT CHANGE UP (ref 27–34)
MCHC RBC-ENTMCNC: 31.1 GM/DL — LOW (ref 32–36)
MCV RBC AUTO: 95.8 FL — SIGNIFICANT CHANGE UP (ref 80–100)
PLATELET # BLD AUTO: 73 K/UL — LOW (ref 150–400)
POTASSIUM SERPL-MCNC: 4.7 MMOL/L — SIGNIFICANT CHANGE UP (ref 3.5–5.3)
POTASSIUM SERPL-SCNC: 4.7 MMOL/L — SIGNIFICANT CHANGE UP (ref 3.5–5.3)
PROTHROM AB SERPL-ACNC: 23.8 SEC — HIGH (ref 10.6–13.6)
PROTHROM AB SERPL-ACNC: 33 SEC — HIGH (ref 10.6–13.6)
PROTHROM AB SERPL-ACNC: 42 SEC — HIGH (ref 10.6–13.6)
RBC # BLD: 3.06 M/UL — LOW (ref 4.2–5.8)
RBC # FLD: 14.3 % — SIGNIFICANT CHANGE UP (ref 10.3–14.5)
SODIUM SERPL-SCNC: 135 MMOL/L — SIGNIFICANT CHANGE UP (ref 135–145)
WBC # BLD: 13.59 K/UL — HIGH (ref 3.8–10.5)
WBC # FLD AUTO: 13.59 K/UL — HIGH (ref 3.8–10.5)

## 2020-11-19 PROCEDURE — 71045 X-RAY EXAM CHEST 1 VIEW: CPT | Mod: 26

## 2020-11-19 PROCEDURE — 99232 SBSQ HOSP IP/OBS MODERATE 35: CPT | Mod: 24

## 2020-11-19 PROCEDURE — 93010 ELECTROCARDIOGRAM REPORT: CPT

## 2020-11-19 PROCEDURE — 90937 HEMODIALYSIS REPEATED EVAL: CPT

## 2020-11-19 PROCEDURE — 99233 SBSQ HOSP IP/OBS HIGH 50: CPT

## 2020-11-19 RX ORDER — ASCORBIC ACID 60 MG
500 TABLET,CHEWABLE ORAL DAILY
Refills: 0 | Status: DISCONTINUED | OUTPATIENT
Start: 2020-11-19 | End: 2020-11-30

## 2020-11-19 RX ORDER — PANTOPRAZOLE SODIUM 20 MG/1
40 TABLET, DELAYED RELEASE ORAL
Refills: 0 | Status: DISCONTINUED | OUTPATIENT
Start: 2020-11-20 | End: 2020-11-30

## 2020-11-19 RX ORDER — SENNA PLUS 8.6 MG/1
2 TABLET ORAL AT BEDTIME
Refills: 0 | Status: DISCONTINUED | OUTPATIENT
Start: 2020-11-19 | End: 2020-11-27

## 2020-11-19 RX ORDER — PHYTONADIONE (VIT K1) 5 MG
1 TABLET ORAL ONCE
Refills: 0 | Status: COMPLETED | OUTPATIENT
Start: 2020-11-19 | End: 2020-11-19

## 2020-11-19 RX ORDER — METOPROLOL TARTRATE 50 MG
25 TABLET ORAL
Refills: 0 | Status: DISCONTINUED | OUTPATIENT
Start: 2020-11-19 | End: 2020-11-19

## 2020-11-19 RX ORDER — METOPROLOL TARTRATE 50 MG
5 TABLET ORAL ONCE
Refills: 0 | Status: COMPLETED | OUTPATIENT
Start: 2020-11-19 | End: 2020-11-19

## 2020-11-19 RX ORDER — PHYTONADIONE (VIT K1) 5 MG
3 TABLET ORAL ONCE
Refills: 0 | Status: DISCONTINUED | OUTPATIENT
Start: 2020-11-19 | End: 2020-11-19

## 2020-11-19 RX ORDER — METOPROLOL TARTRATE 50 MG
25 TABLET ORAL EVERY 8 HOURS
Refills: 0 | Status: DISCONTINUED | OUTPATIENT
Start: 2020-11-19 | End: 2020-11-21

## 2020-11-19 RX ORDER — ALBUMIN HUMAN 25 %
250 VIAL (ML) INTRAVENOUS ONCE
Refills: 0 | Status: COMPLETED | OUTPATIENT
Start: 2020-11-19 | End: 2020-11-19

## 2020-11-19 RX ADMIN — QUETIAPINE FUMARATE 25 MILLIGRAM(S): 200 TABLET, FILM COATED ORAL at 21:30

## 2020-11-19 RX ADMIN — PANTOPRAZOLE SODIUM 40 MILLIGRAM(S): 20 TABLET, DELAYED RELEASE ORAL at 05:11

## 2020-11-19 RX ADMIN — Medication 100.2 MILLIGRAM(S): at 17:54

## 2020-11-19 RX ADMIN — Medication 1 MILLIGRAM(S): at 11:25

## 2020-11-19 RX ADMIN — Medication 500 MILLIGRAM(S): at 11:25

## 2020-11-19 RX ADMIN — SENNA PLUS 2 TABLET(S): 8.6 TABLET ORAL at 21:30

## 2020-11-19 RX ADMIN — Medication 5 MILLIGRAM(S): at 21:30

## 2020-11-19 RX ADMIN — Medication 25 MILLIGRAM(S): at 21:30

## 2020-11-19 RX ADMIN — OXYCODONE HYDROCHLORIDE 10 MILLIGRAM(S): 5 TABLET ORAL at 13:00

## 2020-11-19 RX ADMIN — Medication 25 MILLIGRAM(S): at 12:56

## 2020-11-19 RX ADMIN — LEVETIRACETAM 750 MILLIGRAM(S): 250 TABLET, FILM COATED ORAL at 17:55

## 2020-11-19 RX ADMIN — Medication 125 MILLILITER(S): at 01:00

## 2020-11-19 RX ADMIN — LEVETIRACETAM 750 MILLIGRAM(S): 250 TABLET, FILM COATED ORAL at 05:12

## 2020-11-19 RX ADMIN — Medication 81 MILLIGRAM(S): at 11:25

## 2020-11-19 RX ADMIN — CHLORHEXIDINE GLUCONATE 1 APPLICATION(S): 213 SOLUTION TOPICAL at 11:25

## 2020-11-19 RX ADMIN — Medication 100.2 MILLIGRAM(S): at 22:51

## 2020-11-19 RX ADMIN — PANTOPRAZOLE SODIUM 40 MILLIGRAM(S): 20 TABLET, DELAYED RELEASE ORAL at 17:55

## 2020-11-19 RX ADMIN — ATORVASTATIN CALCIUM 40 MILLIGRAM(S): 80 TABLET, FILM COATED ORAL at 21:30

## 2020-11-19 RX ADMIN — Medication 100.2 MILLIGRAM(S): at 10:28

## 2020-11-19 RX ADMIN — OXYCODONE HYDROCHLORIDE 10 MILLIGRAM(S): 5 TABLET ORAL at 12:37

## 2020-11-19 RX ADMIN — Medication 5 MILLIGRAM(S): at 08:29

## 2020-11-19 NOTE — PROVIDER CONTACT NOTE (EICU) - BACKGROUND
60 M with CAD s/p PCI, AR, s/p aortic dissection repair 2010, CVA L residual weakness, s/p colostomy with reversal presents with SOB and LE edema.

## 2020-11-19 NOTE — CHART NOTE - NSCHARTNOTEFT_GEN_A_CORE
Source: Patient [x ]  Family [ ]   other [x ] EMR and staff     Current Diet: Diet, NPO after Midnight:      NPO Start Date: 19-Nov-2020,   NPO Start Time: 23:59 (11-19-20 @ 09:15)  Diet, Regular:   Consistent Carbohydrate {No Snacks} (CSTCHO)  DASH/TLC {Sodium & Cholesterol Restricted} (DASH) (11-17-20 @ 08:55)      Patient reports [ ] nausea  [ ] vomiting [ ] diarrhea [ ] constipation  [x ]chewing problems [ ] swallowing issues  [x ] other:  Decreased appetite     PO intake:  < 50% [x ]   50-75%  [ ]   %  [ ]  other :    Source for PO intake [x ] Patient [ ] family [x ] chart [x ] staff [ ] other    Current Weight:   11/17: 112 kg  11/15: 114 kg  11/9: 89.5 kg   11/7: 91.8kg     % Weight Change 45# wt change over 10 days, unsure of accuracy of wt's secondary to inconsistency, will continue to monitor wt's for trends. (2+ Generalized edema, 3+ edema to scrotum)     Pertinent Medications: MEDICATIONS  (STANDING):  aspirin enteric coated 81 milliGRAM(s) Oral daily  atorvastatin 40 milliGRAM(s) Oral at bedtime  chlorhexidine 2% Cloths 1 Application(s) Topical daily  folic acid 1 milliGRAM(s) Oral daily  insulin lispro (ADMELOG) corrective regimen sliding scale   SubCutaneous Before meals and at bedtime  levETIRAcetam 750 milliGRAM(s) Oral two times a day  melatonin 5 milliGRAM(s) Oral at bedtime  metoprolol tartrate 25 milliGRAM(s) Oral two times a day  milrinone Infusion 0.25 MICROgram(s)/kG/Min (7.48 mL/Hr) IV Continuous <Continuous>  pantoprazole  Injectable 40 milliGRAM(s) IV Push every 12 hours  phytonadione  IVPB 1 milliGRAM(s) IV Intermittent once  QUEtiapine 25 milliGRAM(s) Oral at bedtime  sodium chloride 0.9%. 1000 milliLiter(s) (10 mL/Hr) IV Continuous <Continuous>    MEDICATIONS  (PRN):  benzocaine 15 mG/menthol 3.6 mG (Sugar-Free) Lozenge 1 Lozenge Oral three times a day PRN Sore Throat  lidocaine   Patch 1 Patch Transdermal every 24 hours PRN as needed  oxyCODONE    IR 5 milliGRAM(s) Oral every 4 hours PRN Moderate Pain (4 - 6)  oxyCODONE    IR 10 milliGRAM(s) Oral every 4 hours PRN Severe Pain (7 - 10)    Pertinent Labs: CBC Full  -  ( 19 Nov 2020 03:09 )  WBC Count : 13.59 K/uL  RBC Count : 3.06 M/uL  Hemoglobin : 9.1 g/dL  Hematocrit : 29.3 %  Platelet Count - Automated : 73 K/uL  Mean Cell Volume : 95.8 fl  Mean Cell Hemoglobin : 29.7 pg  Mean Cell Hemoglobin Concentration : 31.1 gm/dL  Auto Neutrophil # : x  Auto Lymphocyte # : x  Auto Monocyte # : x  Auto Eosinophil # : x  Auto Basophil # : x  Auto Neutrophil % : x  Auto Lymphocyte % : x  Auto Monocyte % : x  Auto Eosinophil % : x  Auto Basophil % : x        Skin: Left leg surgical incision, Midsternal incision    Nutrition focused physical exam conducted - found signs of malnutrition [ ]absent [ x]present    Subcutaneous fat loss:MIld  [x ] Orbital fat pads region, [x ]Buccal fat region, [ ]Triceps region,  [ ]Ribs region    Muscle wasting: mild  [x ]Temples region, [x ]Clavicle region, [x ]Shoulder region, [ ]Scapula region, [ ]Interosseous region,  [ ]thigh region, [ ]Calf region    Estimated Needs:   [x ] no change since previous assessment  [ ] recalculated:     Current Nutrition Diagnosis:  Pt remains at high nutrition risk secondary to moderate-acute protein calorie malnutrition related to inadequate protein energy intake with increased protein energy needs in setting of healing (s/p reop C2V, AVR/MVR, SANTOS requiring CVVHD-now stopped) as evidenced by meeting less then 75% estimated energy requirements > 5 days, fluid accumulation and mild muscle mass and fat loss. Pt reports having decreased appetite, at times refusing to eat. Pt requires total assistance with meals at this time. Encouraged HBV protein foods to promote healing. Pt agreeable to trying supplements. Recommendations below:     Recommendations:   1. RX: MVI and Vit. C daily   2. Total assistance with meals.   3. Encourage with meals   4. Check wt daily to monitor trends  5. Ensure Enlive TID     Monitoring and Evaluation:   [x ] PO intake [ x] Tolerance to diet prescription [X] Weights  [X] Follow up per protocol [X] Labs:

## 2020-11-19 NOTE — OCCUPATIONAL THERAPY INITIAL EVALUATION ADULT - LIVES WITH, PROFILE
alone/in a private house with 5 steps no railings and 5-6 steps inside no railing; pt has no assist upon discharge

## 2020-11-19 NOTE — OCCUPATIONAL THERAPY INITIAL EVALUATION ADULT - PLANNED THERAPY INTERVENTIONS, OT EVAL
ADL retraining/balance training/neuromuscular re-education/fine motor coordination training/motor coordination training/transfer training/bed mobility training/strengthening/ROM

## 2020-11-19 NOTE — PROGRESS NOTE ADULT - SUBJECTIVE AND OBJECTIVE BOX
Significant recent/past 24 hr events: patient is doing well, AV paced 80, may be regaining some intrinsic rhythm back. Did drop pressure once to 70s, after circuit clotted, gave one albumin back     Subjective:    Review of Systems  ROS negative x 10       Patient is a 60y old  Male who presents with a chief complaint of sob (18 Nov 2020 11:48)    HPI:  Patient is a 60 yoM with PMH of CAD s/p stent in LAD, leaky aortic valve, uncontrolled bp, thoracic aortic dissection requiring emergent surgery, CVA with left sided sensory loss, colostomy for bowel ischemia s/p reversal, psoriasis on Humira injections presents from home with sob and le edema. Patient states the past several weeks, he has not been taking his medications because he forgot. Patient states the sob has gotten worse over the past few days and also associated with chest pressure. Patient also hasnt seen his cardiologist in several months. Patient worked up in the er and had an elevated bnp and htn urgency and eric. Patient seen at bedside and states feeling a little better.  (02 Nov 2020 14:32)    PAST MEDICAL & SURGICAL HISTORY:  CVA (cerebral vascular accident)    CHF (congestive heart failure)    H/O colectomy    Aorta disorder      FAMILY HISTORY:  FH: hypertension        Vitals   ICU Vital Signs Last 24 Hrs  T(C): 36.3 (18 Nov 2020 23:19), Max: 36.9 (18 Nov 2020 04:30)  T(F): 97.4 (18 Nov 2020 23:19), Max: 98.5 (18 Nov 2020 04:30)  HR: 101 (19 Nov 2020 02:00) (79 - 114)  BP: 96/55 (19 Nov 2020 02:00) (75/45 - 121/53)  BP(mean): 68 (19 Nov 2020 02:00) (55 - 80)  ABP: 116/62 (19 Nov 2020 02:00) (103/46 - 147/68)  ABP(mean): 76 (19 Nov 2020 02:00) (59 - 89)  RR: 14 (19 Nov 2020 02:00) (7 - 23)  SpO2: 95% (19 Nov 2020 02:00) (91% - 99%)      VENT SETTINGS         I&O's Detail    17 Nov 2020 07:01  -  18 Nov 2020 07:00  --------------------------------------------------------  IN:    DOPamine Infusion: 57 mL    Milrinone: 171.9 mL    Oral Fluid: 824 mL  Total IN: 1052.9 mL    OUT:    Chest Tube (mL): 350 mL    Chest Tube (mL): 30 mL    Chest Tube (mL): 190 mL    Other (mL): 2757 mL  Total OUT: 3327 mL    Total NET: -2274.1 mL      18 Nov 2020 07:01  -  19 Nov 2020 03:01  --------------------------------------------------------  IN:    Albumin 5%  - 250 mL: 500 mL    Milrinone: 43.2 mL    Oral Fluid: 240 mL  Total IN: 783.2 mL    OUT:    Chest Tube (mL): 410 mL    Chest Tube (mL): 270 mL    Chest Tube (mL): 10 mL    Other (mL): 1778 mL  Total OUT: 2468 mL    Total NET: -1684.8 mL          LABS                        10.2   16.85 )-----------( 74       ( 18 Nov 2020 21:53 )             31.5     11-18    136  |  101  |  20.0  ----------------------------<  102<H>  4.8   |  21.0<L>  |  1.26    Ca    7.9<L>      18 Nov 2020 20:45  Mg     2.6     11-18    TPro  5.3<L>  /  Alb  3.2<L>  /  TBili  1.8  /  DBili  x   /  AST  31  /  ALT  20  /  AlkPhos  53  11-18    PT/INR - ( 18 Nov 2020 11:52 )   PT: 31.5 sec;   INR: 2.85 ratio         PTT - ( 18 Nov 2020 11:52 )  PTT:29.6 sec          POCT Blood Glucose.: 105 mg/dL (11-18-20 @ 21:13)  POCT Blood Glucose.: 95 mg/dL (11-18-20 @ 16:50)  POCT Blood Glucose.: 104 mg/dL (11-18-20 @ 11:35)  POCT Blood Glucose.: 83 mg/dL (11-18-20 @ 08:56)  POCT Blood Glucose.: 73 mg/dL (11-18-20 @ 08:01)        MEDICATIONS  (STANDING):  aspirin enteric coated 81 milliGRAM(s) Oral daily  atorvastatin 40 milliGRAM(s) Oral at bedtime  chlorhexidine 2% Cloths 1 Application(s) Topical daily  CRRT Treatment    <Continuous>  folic acid 1 milliGRAM(s) Oral daily  insulin lispro (ADMELOG) corrective regimen sliding scale   SubCutaneous Before meals and at bedtime  levETIRAcetam 750 milliGRAM(s) Oral two times a day  melatonin 5 milliGRAM(s) Oral at bedtime  milrinone Infusion 0.25 MICROgram(s)/kG/Min (7.48 mL/Hr) IV Continuous <Continuous>  pantoprazole  Injectable 40 milliGRAM(s) IV Push every 12 hours  Phoxillum Filtration BK 4 / 2.5 5000 milliLiter(s) (1000 mL/Hr) CRRT <Continuous>  Phoxillum Filtration BK 4 / 2.5 5000 milliLiter(s) (1500 mL/Hr) CRRT <Continuous>  Phoxillum Filtration BK 4 / 2.5 5000 milliLiter(s) (1500 mL/Hr) CRRT <Continuous>  QUEtiapine 25 milliGRAM(s) Oral at bedtime  sodium chloride 0.9%. 1000 milliLiter(s) (10 mL/Hr) IV Continuous <Continuous>    MEDICATIONS  (PRN):  benzocaine 15 mG/menthol 3.6 mG (Sugar-Free) Lozenge 1 Lozenge Oral three times a day PRN Sore Throat  lidocaine   Patch 1 Patch Transdermal every 24 hours PRN as needed  oxyCODONE    IR 5 milliGRAM(s) Oral every 4 hours PRN Moderate Pain (4 - 6)  oxyCODONE    IR 10 milliGRAM(s) Oral every 4 hours PRN Severe Pain (7 - 10)    Allergies:  penicillin (Unknown)      Physical Exam:   Constitutional: NAD, well-groomed, well-developed  HEENT: PERRLA, EOMI, no drainage or redness  Neck: supple,  No JVD, Right HD cath   Respiratory: Breath Sounds equal & clear bilaterally to auscultation, no rales/rhonchi/wheezing, no accessory muscle use noted  Cardiovascular: Regular rate, regular rhythm, normal S1, S2; no murmurs or rub, sternal cressing now c/d/i   Gastrointestinal: Soft, non-tender, non distended, + bowel sounds  Extremities: CORDOVA x 4, no peripheral edema, no cyanosis, no clubbing   Neurological: A+O x 3; speech clear and intact; no sensory, motor  deficits, normal reflexes  Skin: warm, dry, well perfused

## 2020-11-19 NOTE — PROGRESS NOTE ADULT - ASSESSMENT
Patient was seen and evaluated on dialysis.   Patient is tolerating the procedure well.   T(C): 36.9 (11-19-20 @ 12:00), Max: 36.9 (11-19-20 @ 12:00)  HR: 104 (11-19-20 @ 12:30) (79 - 114)  BP: 92/59 (11-19-20 @ 04:00) (75/45 - 121/53)  DisContinue dialysis:   Dialyzer: [facility]         QB: 300 ml.,        QD: 500ml.,  Goal  ml.,  over 6 Hours

## 2020-11-19 NOTE — PROGRESS NOTE ADULT - SUBJECTIVE AND OBJECTIVE BOX
Patient was seen and evaluated on dialysis. ( CVVHDF )  No c/o CP SOB NV  no F/C  ++ swelling    T(C): 36.9 (11-19-20 @ 12:00), Max: 36.9 (11-19-20 @ 12:00)  HR: 104 (11-19-20 @ 12:30) (79 - 114)  BP: 92/59 (11-19-20 @ 04:00) (75/45 - 121/53)  Wt(kg): --NA,   PE ;  NAD, Pale,   lungs - CTA, Drains in Place,   CV gr 1 murmur,  No gallop or rub  Abd : soft, NT BS +, No masses  Ext- ++ edema  Neuro : Grossly intact, moving extremities                         9.1    13.59 )-----------( 73       ( 19 Nov 2020 03:09 )             29.3      11-19    135  |  100  |  21.0<H>  ----------------------------<  90  4.7   |  21.0<L>  |  1.28    Ca    7.8<L>      19 Nov 2020 05:11  Mg     2.6     11-19    TPro  5.3<L>  /  Alb  3.2<L>  /  TBili  1.8  /  DBili  x   /  AST  31  /  ALT  20  /  AlkPhos  53  11-18    MEDICATIONS  (STANDING):  ascorbic acid  aspirin enteric coated  atorvastatin  benzocaine 15 mG/menthol 3.6 mG (Sugar-Free) Lozenge PRN  chlorhexidine 2% Cloths  folic acid  insulin lispro (ADMELOG) corrective regimen sliding scale  levETIRAcetam  lidocaine   Patch PRN  melatonin  metoprolol tartrate  milrinone Infusion  oxyCODONE    IR PRN  oxyCODONE    IR PRN  pantoprazole  Injectable  QUEtiapine  sodium chloride 0.9%.      Patient stable  Serg  CVVHDF easily  Continue Current Management,    TDC in AM,

## 2020-11-19 NOTE — CHART NOTE - TREATMENT: THE FOLLOWING DIET HAS BEEN RECOMMENDED
Diet, NPO after Midnight:      NPO Start Date: 19-Nov-2020,   NPO Start Time: 23:59 (11-19-20 @ 09:15) [Active]  Diet, Regular:   Consistent Carbohydrate {No Snacks} (CSTCHO)  DASH/TLC {Sodium & Cholesterol Restricted} (DASH) (11-17-20 @ 08:55) [Active]

## 2020-11-19 NOTE — PROGRESS NOTE ADULT - SUBJECTIVE AND OBJECTIVE BOX
Patient fatigued and overwhelmed.  Reports total weakness and pain.     REVIEW OF SYSTEMS  Constitutional - No fever,  +fatigue  Neurological - +loss of strength, +numbness, No tremors  Musculoskeletal - +joint pain, +joint swelling, +muscle pain  Psychiatric - +depression, +anxiety    FUNCTIONAL PROGRESS  11/19  Bed Mobility  Bed Mobility Training Sit-to-Supine: moderate assist (50% patient effort);  2 person assist  Bed Mobility Training Supine-to-Sit: moderate assist (50% patient effort);  2 person assist    Sit-Stand Transfer Training  Transfer Training Sit-to-Stand Transfer: moderate assist (50% patient effort);  2 person assist  Transfer Training Stand-to-Sit Transfer: moderate assist (50% patient effort);  2 person assist    Gait Training  Gait Training: 3 steps at bedside HHA modAx2 no device  Gait Analysis: unsteadiness throughout, max verbal cues for sequencing, decreased gait velocity and cheyenne step length      VITALS  T(C): 36.9 (11-19-20 @ 12:00), Max: 36.9 (11-19-20 @ 12:00)  HR: 104 (11-19-20 @ 12:30) (79 - 114)  BP: 92/59 (11-19-20 @ 04:00) (75/45 - 121/53)  RR: 25 (11-19-20 @ 12:30) (7 - 30)  SpO2: 94% (11-19-20 @ 12:30) (91% - 99%)  Wt(kg): --    MEDICATIONS   ascorbic acid 500 milliGRAM(s) daily  aspirin enteric coated 81 milliGRAM(s) daily  atorvastatin 40 milliGRAM(s) at bedtime  benzocaine 15 mG/menthol 3.6 mG (Sugar-Free) Lozenge 1 Lozenge three times a day PRN  chlorhexidine 2% Cloths 1 Application(s) daily  folic acid 1 milliGRAM(s) daily  insulin lispro (ADMELOG) corrective regimen sliding scale   Before meals and at bedtime  levETIRAcetam 750 milliGRAM(s) two times a day  lidocaine   Patch 1 Patch every 24 hours PRN  melatonin 5 milliGRAM(s) at bedtime  metoprolol tartrate 25 milliGRAM(s) every 8 hours  milrinone Infusion 0.25 MICROgram(s)/kG/Min <Continuous>  oxyCODONE    IR 5 milliGRAM(s) every 4 hours PRN  oxyCODONE    IR 10 milliGRAM(s) every 4 hours PRN  pantoprazole  Injectable 40 milliGRAM(s) every 12 hours  QUEtiapine 25 milliGRAM(s) at bedtime  sodium chloride 0.9%. 1000 milliLiter(s) <Continuous>      RECENT LABS/IMAGING - Reviewed                        9.1    13.59 )-----------( 73       ( 19 Nov 2020 03:09 )             29.3     11-19    135  |  100  |  21.0<H>  ----------------------------<  90  4.7   |  21.0<L>  |  1.28    Ca    7.8<L>      19 Nov 2020 05:11  Mg     2.6     11-19    TPro  5.3<L>  /  Alb  3.2<L>  /  TBili  1.8  /  DBili  x   /  AST  31  /  ALT  20  /  AlkPhos  53  11-18    PT/INR - ( 19 Nov 2020 05:11 )   PT: 42.0 sec;   INR: 3.86 ratio         PTT - ( 19 Nov 2020 05:11 )  PTT:36.1 sec          CTA NECK/BRAIN 11/12 - 70% stenosis in the right carotid bulb. Aortic arch dissection extending into the great vessels, as above. Motion artifacts limits evaluation of the left common carotid artery.    CXR 11/16 - Retrocardiac opacity, likely atelectasis.    CXR 11/19 - Status post open heart surgery. No evidence of active chest pathology. Catheters and/or tubes in place    ----------------------------------------------------------------------------------------  PHYSICAL EXAM  Constitutional - NAD, Uncomfortable  Extremities - Diffuse edema, BLE PRAFO  Neurologic Exam -                    Cognitive - Awake, Alert, AAO to self, situation     Communication - Fluent, +dysarthria     Motor -  Diffuse weakness - limited by participation                     LEFT    UE - ShAB 0/5, EF 0/5, EE 0/5,  3/5                    RIGHT UE - ShAB 1/5, EF 3/5, EE 2/5,  4/5                    LEFT    LE - HF 0/5, KE 0/5, DF 1/5                     RIGHT LE - HF 0/5, KE 0/5, DF 1/5      Sensory - Decreased to the left      Coordination - FTN impaired  Psychiatric - Mood depressed, anxious  ----------------------------------------------------------------------------------------  ASSESSMENT/PLAN  60yMale with functional deficits after debility related to severe MR and AR s/p AVR/MVR   CAD s/p CABGx2, Old CVA - ASA, Lipitor  HTN - Lopressor  Pain - Tylenol, Lidoderm, Oxycodone, Recommend Neurontin 400mg HS (left sided neuropathy)  Sleep - Melatonin  Mood - Seroquel  Seizures - Keppra  DVT PPX - SCDs  Rehab - Patient continues to total A. Continue to recommend SEAN, patient DOES NOT meet acute inpatient rehabilitation criteria. Patient needs a more prolonged stay to achieve transition to community. Will continue to follow. Functional progress will determine ongoing rehab dispo recommendations, which may change.    Continue bedside therapy as well as OOB throughout the day with mobilization by staff to maintain cardiopulmonary function and prevention of secondary complications related to debility.     Discussed with rehab team.

## 2020-11-20 DIAGNOSIS — I50.31 ACUTE DIASTOLIC (CONGESTIVE) HEART FAILURE: ICD-10-CM

## 2020-11-20 DIAGNOSIS — R79.1 ABNORMAL COAGULATION PROFILE: ICD-10-CM

## 2020-11-20 DIAGNOSIS — N18.6 END STAGE RENAL DISEASE: ICD-10-CM

## 2020-11-20 LAB
ANION GAP SERPL CALC-SCNC: 13 MMOL/L — SIGNIFICANT CHANGE UP (ref 5–17)
ANION GAP SERPL CALC-SCNC: 15 MMOL/L — SIGNIFICANT CHANGE UP (ref 5–17)
APTT BLD: 29.9 SEC — SIGNIFICANT CHANGE UP (ref 27.5–35.5)
BUN SERPL-MCNC: 35 MG/DL — HIGH (ref 8–20)
BUN SERPL-MCNC: 51 MG/DL — HIGH (ref 8–20)
CALCIUM SERPL-MCNC: 8 MG/DL — LOW (ref 8.6–10.2)
CALCIUM SERPL-MCNC: 8.1 MG/DL — LOW (ref 8.6–10.2)
CHLORIDE SERPL-SCNC: 101 MMOL/L — SIGNIFICANT CHANGE UP (ref 98–107)
CHLORIDE SERPL-SCNC: 101 MMOL/L — SIGNIFICANT CHANGE UP (ref 98–107)
CO2 SERPL-SCNC: 17 MMOL/L — LOW (ref 22–29)
CO2 SERPL-SCNC: 19 MMOL/L — LOW (ref 22–29)
CREAT SERPL-MCNC: 2.62 MG/DL — HIGH (ref 0.5–1.3)
CREAT SERPL-MCNC: 3.65 MG/DL — HIGH (ref 0.5–1.3)
GLUCOSE SERPL-MCNC: 105 MG/DL — HIGH (ref 70–99)
GLUCOSE SERPL-MCNC: 130 MG/DL — HIGH (ref 70–99)
HCT VFR BLD CALC: 28.8 % — LOW (ref 39–50)
HGB BLD-MCNC: 9.3 G/DL — LOW (ref 13–17)
INR BLD: 1.6 RATIO — HIGH (ref 0.88–1.16)
MAGNESIUM SERPL-MCNC: 2.5 MG/DL — SIGNIFICANT CHANGE UP (ref 1.6–2.6)
MAGNESIUM SERPL-MCNC: 2.6 MG/DL — SIGNIFICANT CHANGE UP (ref 1.6–2.6)
MCHC RBC-ENTMCNC: 30.6 PG — SIGNIFICANT CHANGE UP (ref 27–34)
MCHC RBC-ENTMCNC: 32.3 GM/DL — SIGNIFICANT CHANGE UP (ref 32–36)
MCV RBC AUTO: 94.7 FL — SIGNIFICANT CHANGE UP (ref 80–100)
PHOSPHATE SERPL-MCNC: 4.9 MG/DL — HIGH (ref 2.4–4.7)
PLATELET # BLD AUTO: 72 K/UL — LOW (ref 150–400)
POTASSIUM SERPL-MCNC: 4.5 MMOL/L — SIGNIFICANT CHANGE UP (ref 3.5–5.3)
POTASSIUM SERPL-MCNC: 4.6 MMOL/L — SIGNIFICANT CHANGE UP (ref 3.5–5.3)
POTASSIUM SERPL-SCNC: 4.5 MMOL/L — SIGNIFICANT CHANGE UP (ref 3.5–5.3)
POTASSIUM SERPL-SCNC: 4.6 MMOL/L — SIGNIFICANT CHANGE UP (ref 3.5–5.3)
PROTHROM AB SERPL-ACNC: 18.1 SEC — HIGH (ref 10.6–13.6)
RBC # BLD: 3.04 M/UL — LOW (ref 4.2–5.8)
RBC # FLD: 14.6 % — HIGH (ref 10.3–14.5)
SODIUM SERPL-SCNC: 133 MMOL/L — LOW (ref 135–145)
SODIUM SERPL-SCNC: 133 MMOL/L — LOW (ref 135–145)
WBC # BLD: 7.63 K/UL — SIGNIFICANT CHANGE UP (ref 3.8–10.5)
WBC # FLD AUTO: 7.63 K/UL — SIGNIFICANT CHANGE UP (ref 3.8–10.5)

## 2020-11-20 PROCEDURE — 99233 SBSQ HOSP IP/OBS HIGH 50: CPT

## 2020-11-20 PROCEDURE — 76937 US GUIDE VASCULAR ACCESS: CPT | Mod: 26

## 2020-11-20 PROCEDURE — 36558 INSERT TUNNELED CV CATH: CPT

## 2020-11-20 PROCEDURE — 71045 X-RAY EXAM CHEST 1 VIEW: CPT | Mod: 26

## 2020-11-20 PROCEDURE — 93010 ELECTROCARDIOGRAM REPORT: CPT

## 2020-11-20 RX ORDER — AMIODARONE HYDROCHLORIDE 400 MG/1
TABLET ORAL
Refills: 0 | Status: DISCONTINUED | OUTPATIENT
Start: 2020-11-20 | End: 2020-11-30

## 2020-11-20 RX ORDER — AMIODARONE HYDROCHLORIDE 400 MG/1
150 TABLET ORAL ONCE
Refills: 0 | Status: COMPLETED | OUTPATIENT
Start: 2020-11-20 | End: 2020-11-20

## 2020-11-20 RX ORDER — AMIODARONE HYDROCHLORIDE 400 MG/1
200 TABLET ORAL DAILY
Refills: 0 | Status: DISCONTINUED | OUTPATIENT
Start: 2020-11-24 | End: 2020-11-30

## 2020-11-20 RX ORDER — GABAPENTIN 400 MG/1
100 CAPSULE ORAL AT BEDTIME
Refills: 0 | Status: DISCONTINUED | OUTPATIENT
Start: 2020-11-20 | End: 2020-11-30

## 2020-11-20 RX ORDER — AMIODARONE HYDROCHLORIDE 400 MG/1
400 TABLET ORAL EVERY 8 HOURS
Refills: 0 | Status: COMPLETED | OUTPATIENT
Start: 2020-11-20 | End: 2020-11-24

## 2020-11-20 RX ORDER — WARFARIN SODIUM 2.5 MG/1
5 TABLET ORAL ONCE
Refills: 0 | Status: COMPLETED | OUTPATIENT
Start: 2020-11-20 | End: 2020-11-20

## 2020-11-20 RX ADMIN — OXYCODONE HYDROCHLORIDE 10 MILLIGRAM(S): 5 TABLET ORAL at 03:04

## 2020-11-20 RX ADMIN — ATORVASTATIN CALCIUM 40 MILLIGRAM(S): 80 TABLET, FILM COATED ORAL at 21:08

## 2020-11-20 RX ADMIN — Medication 5 MILLIGRAM(S): at 21:08

## 2020-11-20 RX ADMIN — Medication 100 MILLIGRAM(S): at 11:00

## 2020-11-20 RX ADMIN — PANTOPRAZOLE SODIUM 40 MILLIGRAM(S): 20 TABLET, DELAYED RELEASE ORAL at 17:05

## 2020-11-20 RX ADMIN — OXYCODONE HYDROCHLORIDE 10 MILLIGRAM(S): 5 TABLET ORAL at 02:04

## 2020-11-20 RX ADMIN — CHLORHEXIDINE GLUCONATE 1 APPLICATION(S): 213 SOLUTION TOPICAL at 14:29

## 2020-11-20 RX ADMIN — GABAPENTIN 100 MILLIGRAM(S): 400 CAPSULE ORAL at 21:07

## 2020-11-20 RX ADMIN — AMIODARONE HYDROCHLORIDE 618 MILLIGRAM(S): 400 TABLET ORAL at 16:39

## 2020-11-20 RX ADMIN — LEVETIRACETAM 750 MILLIGRAM(S): 250 TABLET, FILM COATED ORAL at 06:09

## 2020-11-20 RX ADMIN — PANTOPRAZOLE SODIUM 40 MILLIGRAM(S): 20 TABLET, DELAYED RELEASE ORAL at 06:11

## 2020-11-20 RX ADMIN — Medication 1 TABLET(S): at 16:39

## 2020-11-20 RX ADMIN — Medication 500 MILLIGRAM(S): at 14:30

## 2020-11-20 RX ADMIN — OXYCODONE HYDROCHLORIDE 10 MILLIGRAM(S): 5 TABLET ORAL at 23:45

## 2020-11-20 RX ADMIN — LEVETIRACETAM 750 MILLIGRAM(S): 250 TABLET, FILM COATED ORAL at 17:05

## 2020-11-20 RX ADMIN — Medication 25 MILLIGRAM(S): at 14:29

## 2020-11-20 RX ADMIN — OXYCODONE HYDROCHLORIDE 10 MILLIGRAM(S): 5 TABLET ORAL at 23:12

## 2020-11-20 RX ADMIN — AMIODARONE HYDROCHLORIDE 618 MILLIGRAM(S): 400 TABLET ORAL at 07:24

## 2020-11-20 RX ADMIN — AMIODARONE HYDROCHLORIDE 400 MILLIGRAM(S): 400 TABLET ORAL at 21:08

## 2020-11-20 RX ADMIN — Medication 25 MILLIGRAM(S): at 06:10

## 2020-11-20 RX ADMIN — QUETIAPINE FUMARATE 25 MILLIGRAM(S): 200 TABLET, FILM COATED ORAL at 21:08

## 2020-11-20 RX ADMIN — WARFARIN SODIUM 5 MILLIGRAM(S): 2.5 TABLET ORAL at 21:08

## 2020-11-20 RX ADMIN — Medication 1 MILLIGRAM(S): at 14:30

## 2020-11-20 RX ADMIN — Medication 25 MILLIGRAM(S): at 21:08

## 2020-11-20 RX ADMIN — Medication 81 MILLIGRAM(S): at 14:30

## 2020-11-20 NOTE — PROGRESS NOTE ADULT - PROBLEM SELECTOR PLAN 1
neurologically at baseline  hemodynamically stable off pressors and inotropic support  bradycardia resolved, continue beta-blockers  continue aspirin for acute graft closure prophylaxis  continue statin for chronic graft patency prophylaxis  oxygenating well, wean FiO2 as tolerated, coughing and deep breathing exercises/incentive spirometry encouraged  nocturnal BiPAP encouraged but ultimately refused by pt  maintain CTs for now  above plan of care to be discussed with CT surgical team on am rounds

## 2020-11-20 NOTE — PROGRESS NOTE ADULT - ASSESSMENT
60 year old male with PMH Type A dissection (2010) c/b bowel ischemia (colostomy since reversed, wound dehiscence), CVA w/ residual left sided weakness, seizures, cocaine/ alcohol/ opioid/ benzo abuse, HTN, likely COVID in Sept, (+ Ab), CAD s/p stent in LAD, MR and AI, uncontrolled HTN  11/2 presents from home with SOB and BLE edema.   A/w acute diastolic heart failure, moderate-severe MR, moderate TR, mod-severe AI, SANTOS on CKD requiring HD, anemia (likely d/t chronic renal disease, GI w/u unremarkable). LHC revealed severe 2 vessel CAD and known residual Type B dissection.   11/13 s/p resternotomy, CABG x 2 (SVG to OM, RPDA), AVR, MVR. Intra-op acute blood loss anemia and coagulopathy.  Post-op course notable for cardiogenic shock now resolved, acute blood loss anemia (improved), acute hypoxemic respiratory failure, junctional rhythm (with periods of ventricular standstill) now resolved (ST 100s), Supratherapeutic INR.

## 2020-11-20 NOTE — CHART NOTE - NSCHARTNOTEFT_GEN_A_CORE
STATUS POST:    POD 0 s/p right tunneled dialysis catheter placement. Pt doing well in CTICU with no complaints, denies nausea, vomiting, fever, chills. Has not yet voided but endorses urge to urinate    Vital Signs Last 24 Hrs  T(C): 36.3 (20 Nov 2020 12:00), Max: 37.1 (19 Nov 2020 22:23)  T(F): 97.3 (20 Nov 2020 12:00), Max: 98.7 (19 Nov 2020 22:23)  HR: 104 (20 Nov 2020 15:00) (101 - 106)  BP: 111/75 (20 Nov 2020 15:00) (108/63 - 170/79)  BP(mean): 88 (20 Nov 2020 15:00) (79 - 100)  RR: 15 (20 Nov 2020 15:00) (9 - 27)  SpO2: 93% (20 Nov 2020 15:00) (93% - 100%)  I&O's Summary    19 Nov 2020 07:01  -  20 Nov 2020 07:00  --------------------------------------------------------  IN: 1145 mL / OUT: 810 mL / NET: 335 mL    20 Nov 2020 07:01  -  20 Nov 2020 16:32  --------------------------------------------------------  IN: 0 mL / OUT: 360 mL / NET: -360 mL      I&O's Detail    19 Nov 2020 07:01  -  20 Nov 2020 07:00  --------------------------------------------------------  IN:    Oral Fluid: 920 mL    Other (mL): 225 mL  Total IN: 1145 mL    OUT:    Chest Tube (mL): 480 mL    Chest Tube (mL): 20 mL    Chest Tube (mL): 310 mL  Total OUT: 810 mL    Total NET: 335 mL      20 Nov 2020 07:01  -  20 Nov 2020 16:32  --------------------------------------------------------  IN:  Total IN: 0 mL    OUT:    Chest Tube (mL): 160 mL    Chest Tube (mL): 170 mL    Chest Tube (mL): 30 mL  Total OUT: 360 mL    Total NET: -360 mL          MEDICATIONS  (STANDING):  aMIOdarone    Tablet   Oral   aMIOdarone    Tablet 400 milliGRAM(s) Oral every 8 hours  aMIOdarone IVPB 150 milliGRAM(s) IV Intermittent once  ascorbic acid 500 milliGRAM(s) Oral daily  aspirin enteric coated 81 milliGRAM(s) Oral daily  atorvastatin 40 milliGRAM(s) Oral at bedtime  chlorhexidine 2% Cloths 1 Application(s) Topical daily  clindamycin IVPB 600 milliGRAM(s) IV Intermittent once  folic acid 1 milliGRAM(s) Oral daily  gabapentin 100 milliGRAM(s) Oral at bedtime  levETIRAcetam 750 milliGRAM(s) Oral two times a day  melatonin 5 milliGRAM(s) Oral at bedtime  metoprolol tartrate 25 milliGRAM(s) Oral every 8 hours  multivitamin 1 Tablet(s) Oral daily  pantoprazole    Tablet 40 milliGRAM(s) Oral two times a day  QUEtiapine 25 milliGRAM(s) Oral at bedtime  senna 2 Tablet(s) Oral at bedtime  sodium chloride 0.9%. 1000 milliLiter(s) (10 mL/Hr) IV Continuous <Continuous>  warfarin 5 milliGRAM(s) Oral once    MEDICATIONS  (PRN):  benzocaine 15 mG/menthol 3.6 mG (Sugar-Free) Lozenge 1 Lozenge Oral three times a day PRN Sore Throat  lidocaine   Patch 1 Patch Transdermal every 24 hours PRN as needed  oxyCODONE    IR 5 milliGRAM(s) Oral every 4 hours PRN Moderate Pain (4 - 6)  oxyCODONE    IR 10 milliGRAM(s) Oral every 4 hours PRN Severe Pain (7 - 10)      LABS:                        9.3    7.63  )-----------( 72       ( 20 Nov 2020 03:13 )             28.8     11-20    133<L>  |  101  |  35.0<H>  ----------------------------<  105<H>  4.5   |  19.0<L>  |  2.62<H>    Ca    8.0<L>      20 Nov 2020 03:13  Phos  4.9     11-20  Mg     2.5     11-20      PT/INR - ( 20 Nov 2020 03:13 )   PT: 18.1 sec;   INR: 1.60 ratio         PTT - ( 20 Nov 2020 03:13 )  PTT:29.9 sec      RADIOLOGY & ADDITIONAL STUDIES:    PHYSICAL EXAM:  General: NAD, A&O x 3  CV: midline sternal incision dressing c/d/i  Right permcath in place, surrounding skin is non-tender, non-erythematous; dressing slighlty soaked with serosanguinous fluid; no active bleeding  Resp: breathing comfortably      A/P: 60y man with multiple comorbidities, admitted to the CTICU, s/p resternotomy with CABGx2. Patient with SANTOS on CKD requiring HD. Vascular surgery consulted for placement of tunneled dialysis catheter.      SANTOS on CKD requiring HD  -s/p right tunneled dialysis catheter, recovering well  - vascular to follow to ensure tolerates HD via permcath  -No AV fistula needed per Renal [Menstruating] : menstruating

## 2020-11-20 NOTE — PROGRESS NOTE ADULT - SUBJECTIVE AND OBJECTIVE BOX
HPI/OVERNIGHT EVENTS:  Patient examined at bedside. Alert to person and place. No acute events overnight. NPO since midnight.     MEDICATIONS  (STANDING):  ascorbic acid 500 milliGRAM(s) Oral daily  aspirin enteric coated 81 milliGRAM(s) Oral daily  atorvastatin 40 milliGRAM(s) Oral at bedtime  chlorhexidine 2% Cloths 1 Application(s) Topical daily  folic acid 1 milliGRAM(s) Oral daily  levETIRAcetam 750 milliGRAM(s) Oral two times a day  melatonin 5 milliGRAM(s) Oral at bedtime  metoprolol tartrate 25 milliGRAM(s) Oral every 8 hours  multivitamin 1 Tablet(s) Oral daily  pantoprazole    Tablet 40 milliGRAM(s) Oral two times a day  QUEtiapine 25 milliGRAM(s) Oral at bedtime  senna 2 Tablet(s) Oral at bedtime  sodium chloride 0.9%. 1000 milliLiter(s) (10 mL/Hr) IV Continuous <Continuous>    MEDICATIONS  (PRN):  benzocaine 15 mG/menthol 3.6 mG (Sugar-Free) Lozenge 1 Lozenge Oral three times a day PRN Sore Throat  lidocaine   Patch 1 Patch Transdermal every 24 hours PRN as needed  oxyCODONE    IR 5 milliGRAM(s) Oral every 4 hours PRN Moderate Pain (4 - 6)  oxyCODONE    IR 10 milliGRAM(s) Oral every 4 hours PRN Severe Pain (7 - 10)      Vital Signs Last 24 Hrs  T(C): 36.4 (20 Nov 2020 07:00), Max: 37.1 (19 Nov 2020 22:23)  T(F): 97.5 (20 Nov 2020 07:00), Max: 98.7 (19 Nov 2020 22:23)  HR: 106 (20 Nov 2020 06:00) (102 - 106)  BP: --  BP(mean): --  RR: 17 (20 Nov 2020 06:00) (9 - 30)  SpO2: 96% (20 Nov 2020 06:00) (91% - 100%)    Constitutional: patient sitting in bed, mildly somnolent however conversing   Neck: R-neck dressing c/d/i  Respiratory: respirations are unlabored, no accessory muscle use, no conversational dyspnea  Chest: midline sternotomy with island dressing in place, c/d/i  Gastrointestinal: Abdomen soft, CT drains noted exiting at abdominal wall  Musculoskeletal: 2+ pitting edema b/l LE      I&O's Detail    19 Nov 2020 07:01  -  20 Nov 2020 07:00  --------------------------------------------------------  IN:    Oral Fluid: 920 mL    Other (mL): 225 mL  Total IN: 1145 mL    OUT:    Chest Tube (mL): 480 mL    Chest Tube (mL): 20 mL    Chest Tube (mL): 310 mL  Total OUT: 810 mL    Total NET: 335 mL          LABS:                        9.3    7.63  )-----------( 72       ( 20 Nov 2020 03:13 )             28.8     11-20    133<L>  |  101  |  35.0<H>  ----------------------------<  105<H>  4.5   |  19.0<L>  |  2.62<H>    Ca    8.0<L>      20 Nov 2020 03:13  Phos  4.9     11-20  Mg     2.5     11-20      PT/INR - ( 20 Nov 2020 03:13 )   PT: 18.1 sec;   INR: 1.60 ratio         PTT - ( 20 Nov 2020 03:13 )  PTT:29.9 sec

## 2020-11-20 NOTE — PROGRESS NOTE ADULT - ASSESSMENT
60y man with multiple comorbidities, admitted to the CTICU, s/p resternotomy with CABGx2. Patient with SANTSO on CKD requiring HD. Vascular surgery consulted for placement of tunneled dialysis catheter.      SANTOS on CKD requiring HD  -Cath lab today for RIJ Tunneled dialysis catheter   -INR 1.6 (GOAL INR <1.5 for procedure)  -No AV fistula needed per Renal

## 2020-11-20 NOTE — PROGRESS NOTE ADULT - SUBJECTIVE AND OBJECTIVE BOX
Department of Cardiology                                                                  [facility] [address] [place] [address]                                                            Telephone: [phone]. Fax:[phone]                                                    Post- Procedure Note: PermaCath Placement        61 y/o M S/P PermCath placement to right anterior chest wall with Dr. Bharathi WALTON 23cm GlidePath  IVF: NS 50mL  Clindamycin 600mg IVPB    PAST MEDICAL & SURGICAL HISTORY:  CVA (cerebral vascular accident)  CHF (congestive heart failure)  H/O colectomy  Aorta disorder    FH: hypertension    Home Medications:  amLODIPine 5 mg oral tablet: 1 tab(s) orally once a day (15 Oct 2019 09:55)  aspirin 81 mg oral delayed release tablet: 1 tab(s) orally once a day (15 Oct 2019 09:55)  atenolol 50 mg oral tablet: 1 tab(s) orally once a day (15 Oct 2019 09:55)  folic acid 1 mg oral tablet: 1 tab(s) orally once a day (15 Oct 2019 09:55)  Humira 40 mg/0.8 mL subcutaneous kit: every other week (15 Oct 2019 09:55)  Keppra 750 mg oral tablet: 1 tab(s) orally 2 times a day (15 Oct 2019 09:55)  lisinopril 5 mg oral tablet: 1 tab(s) orally once a day (15 Oct 2019 09:55)  omeprazole 20 mg oral delayed release capsule: 1 cap(s) orally once a day (15 Oct 2019 09:55)  pravastatin 20 mg oral tablet: 1 tab(s) orally once a day (15 Oct 2019 09:55)  Vitamin D3 50,000 intl units oral capsule: 1 cap(s) orally once a month (15 Oct 2019 09:55)    Patient is a 60y old  Male who presents with a chief complaint of sob (2020 12:19)    HEALTH ISSUES - PROBLEM Dx:  Supratherapeutic INR  Acute diastolic heart failure  ESRD (end stage renal disease)  Need for prophylactic measure  Essential hypertension  Seizures  CVA (cerebral vascular accident)  Other iron deficiency anemia  Cerebrovascular accident (CVA), unspecified mechanism  SANTOS (acute kidney injury)  Acute diastolic HF (heart failure)  Mitral and aortic regurgitation  Anemia due to other cause    HPI:  Patient is a 60 yoM with PMH of CAD s/p stent in LAD, leaky aortic valve, uncontrolled bp, thoracic aortic dissection requiring emergent surgery, CVA with left sided sensory loss, colostomy for bowel ischemia s/p reversal, psoriasis on Humira injections presents from home with sob and le edema. Patient states the past several weeks, he has not been taking his medications because he forgot. Patient states the sob has gotten worse over the past few days and also associated with chest pressure. Patient also hasnt seen his cardiologist in several months. Patient worked up in the er and had an elevated bnp and htn urgency and santos. Patient seen at bedside and states feeling a little better.  (2020 14:32)    General: No fatigue, no fevers/chills  Respiratory: No dyspnea, no cough, no wheeze  CV: No chest pain,   Abd: No nausea  Neuro: No headache, no dizziness  penicillin (Unknown)    Objective:  Vital Signs Last 24 Hrs  T(C): 36.3 (2020 12:00), Max: 37.1 (2020 22:23)  T(F): 97.3 (2020 12:00), Max: 98.7 (2020 22:23)  HR: 104 (2020 15:00) (101 - 106)  BP: 111/75 (2020 15:00) (108/63 - 170/79)  BP(mean): 88 (2020 15:00) (79 - 100)  RR: 15 (2020 15:00) (9 - 27)  SpO2: 93% (2020 15:00) (93% - 100%)    CM: SR  Neuro: A&OX3, CN 2-12 intact  HEENT: NC, AT  Lungs: Diminished to auscultation B/L  CV: S1, S2, no murmur, RRR  Abd: Soft  EK.3    7.63  )-----------( 72       ( 2020 03:13 )             28.8     11-20    133<L>  |  101  |  35.0<H>  ----------------------------<  105<H>  4.5   |  19.0<L>  |  2.62<H>    Ca    8.0<L>      2020 03:13  Phos  4.9     11-20  Mg     2.5     11-20    PT/INR - ( 2020 03:13 )   PT: 18.1 sec;   INR: 1.60 ratio    PTT - ( 2020 03:13 )  PTT:29.9 sec      ADMIT due to: Pt is already in-patient  -post PermCath orders  -bedrest x1 hours post procedure  -continue current medical therapy  -Ok to use permacath  -Diaylsis as ordered  -Management per CTICU  - Vascular following

## 2020-11-20 NOTE — PROGRESS NOTE ADULT - SUBJECTIVE AND OBJECTIVE BOX
Brief Hospital Course:   60 year old male with PMH Type A dissection (2010) c/b bowel ischemia (colostomy since reversed, wound dehiscence), CVA w/ residual left sided weakness, seizures, cocaine/ alcohol/ opioid/ benzo abuse, HTN, likely COVID in Sept, (+ Ab), CAD s/p stent in LAD, MR and AI, uncontrolled HTN.  11/2 presents from home with SOB and BLE edema.   11/4 TTE w/ EF 55% moderate-severe MR, moderate TR, mod-severe AI, grade II diastolic dysfunction.   11/6 SANTOS on CKD requiring HD  11/9 EGD and Coloscopy 2/2 to anemia. (EGD: Irregular Z line. 3 cm hiatal hernia. Normal stomach and duodenum. Colonoscopy: Normal rectum. Normal anastomotic site and neoterminal ileum. Normal blind pouch)  11/10 LHC (Patent ascending aortic graft, severe 2 vessel CAD with chronically occluded proximal RCA. There are brisk collaterals from the LCA. Known residual Type B dissection in the innominate artery, subclavian artery, arch, and descending aorta. Low normal LV systolic function with moderate to severe AI and MR (best seen on echo). The IMAs are both patent.  11/13 s/p resternotomy, CABG x 2 (SVG to OM, RPDA), AVR, MVR. Intra-op acute blood loss anemia and coagulopathy. From OR paced.  11/14 primacor d/c'd  11/15 levo weaned off, intermittent low dose vaso still needed, CVVHD started, Ashtyn weaned off  11/16 extubated, milrinone restarted  11/17 epinephrine weaned to off  11/18 PM&R consulted  11/19 milrinone weaned off, supratherapeutic INR (vitamin k 1 mg IVPB x 3, in anticipation of permacath insertion on 11/20), EPM changed to VVI (now ST 100s, was junctional vs intermittent ventricular standstill)      Subjective: no c/o at this time. Refusing to wear BiPAP (will try for a few hours after importance of it was explained.) Denies CP, SOB, palpitations, N/V, other c/o.  ROS negative x 10 systems except as noted above      Patient is a 60y old  Male who presents with a chief complaint of sob (19 Nov 2020 14:04)    HPI:  Patient is a 60 yoM with PMH of CAD s/p stent in LAD, leaky aortic valve, uncontrolled bp, thoracic aortic dissection requiring emergent surgery, CVA with left sided sensory loss, colostomy for bowel ischemia s/p reversal, psoriasis on Humira injections presents from home with sob and le edema. Patient states the past several weeks, he has not been taking his medications because he forgot. Patient states the sob has gotten worse over the past few days and also associated with chest pressure. Patient also hasnt seen his cardiologist in several months. Patient worked up in the er and had an elevated bnp and htn urgency and santos. Patient seen at bedside and states feeling a little better.  (02 Nov 2020 14:32)      Vitals   ICU Vital Signs Last 24 Hrs  T(C): 36.7 (20 Nov 2020 00:17), Max: 37.1 (19 Nov 2020 22:23)  T(F): 98 (20 Nov 2020 00:17), Max: 98.7 (19 Nov 2020 22:23)  HR: 104 (20 Nov 2020 00:00) (100 - 104), ST  BP: 92/59 (19 Nov 2020 04:00) (92/59 - 99/65)  BP(mean): 71 (19 Nov 2020 04:00) (68 - 75)  ABP: 130/60 (20 Nov 2020 00:00) (109/54 - 177/78)  ABP(mean): 78 (20 Nov 2020 00:00) (67 - 104)  RR: 16 (20 Nov 2020 00:00) (7 - 30)  SpO2: 95% (20 Nov 2020 00:00) (91% - 100%) on 1L NC      I&O's Detail    18 Nov 2020 07:01  -  19 Nov 2020 07:00  --------------------------------------------------------  Total IN: 943.6 mL  Total OUT: 3313 mL  Total NET: -2369.4 mL    19 Nov 2020 07:01  -  20 Nov 2020 01:11  --------------------------------------------------------  IN:    Oral Fluid: 920 mL    Other (mL): 225 mL  Total IN: 1145 mL    OUT:    Chest Tube (mL): 410 mL    Chest Tube (mL): 260 mL    Chest Tube (mL): 20 mL  Total OUT: 690 mL    Total NET: 455 mL      LABS                        9.1    13.59 )-----------( 73       ( 19 Nov 2020 03:09 )             29.3     11-19    135  |  100  |  21.0<H>  ----------------------------<  90  4.7   |  21.0<L>  |  1.28    Ca    7.8<L>      19 Nov 2020 05:11  Mg     2.6     11-19    TPro  5.3<L>  /  Alb  3.2<L>  /  TBili  1.8  /  DBili  x   /  AST  31  /  ALT  20  /  AlkPhos  53  11-18    PT/INR - ( 19 Nov 2020 20:45 )   PT: 23.8 sec;   INR: 2.13 ratio      Prothrombin Time and INR, Plasma in AM (11.19.20 @ 20:45)    Prothrombin Time, Plasma: 23.8 sec    INR: 2.13 ratio  Prothrombin Time and INR, Plasma (11.19.20 @ 15:31)    Prothrombin Time, Plasma: 33.0 sec    INR: 3.00 ratio  Prothrombin Time and INR, Plasma in AM (11.19.20 @ 05:11)    Prothrombin Time, Plasma: 42.0 sec    INR: 3.86 ratio    POCT Blood Glucose.: 118 mg/dL (11-19-20 @ 16:29)  POCT Blood Glucose.: 105 mg/dL (11-19-20 @ 16:13)  POCT Blood Glucose.: 115 mg/dL (11-19-20 @ 11:18)  POCT Blood Glucose.: 95 mg/dL (11-19-20 @ 08:56)      < from: Xray Chest 1 View- PORTABLE-Routine (Xray Chest 1 View- PORTABLE-Routine in AM.) (11.19.20 @ 05:33) >  FINDINGS:   11/18/2020 chest radiograph available for review.  [A right IJ catheter sheath tip assembly in SVC.]  Mediastinal drains in place.  Bilateral chest tubes in place.  The lungs are clear of gross airspace consolidations or effusions. No pneumothorax.  The heart and mediastinum are within normal limits following cardiac  surgery.  Visualized osseous structures are intact.  IMPRESSION:  Status post open heart surgery.  No evidence of active chest pathology.  Catheters and/or tubes in place  < end of copied text >      MEDICATIONS  (STANDING):  ascorbic acid 500 milliGRAM(s) Oral daily  aspirin enteric coated 81 milliGRAM(s) Oral daily  atorvastatin 40 milliGRAM(s) Oral at bedtime  chlorhexidine 2% Cloths 1 Application(s) Topical daily  folic acid 1 milliGRAM(s) Oral daily  levETIRAcetam 750 milliGRAM(s) Oral two times a day  melatonin 5 milliGRAM(s) Oral at bedtime  metoprolol tartrate 25 milliGRAM(s) Oral every 8 hours  multivitamin 1 Tablet(s) Oral daily  pantoprazole    Tablet 40 milliGRAM(s) Oral two times a day  QUEtiapine 25 milliGRAM(s) Oral at bedtime  senna 2 Tablet(s) Oral at bedtime  sodium chloride 0.9%. 1000 milliLiter(s) (10 mL/Hr) IV Continuous <Continuous>    MEDICATIONS  (PRN):  benzocaine 15 mG/menthol 3.6 mG (Sugar-Free) Lozenge 1 Lozenge Oral three times a day PRN Sore Throat  lidocaine   Patch 1 Patch Transdermal every 24 hours PRN as needed  oxyCODONE    IR 5 milliGRAM(s) Oral every 4 hours PRN Moderate Pain (4 - 6)  oxyCODONE    IR 10 milliGRAM(s) Oral every 4 hours PRN Severe Pain (7 - 10)    Allergies: penicillin (Unknown)      Physical Exam:     Neuro: A+O x 2-3 (has periods of confusion at times), non-focal, speech slightly slurred but is intelligible  HEENT:  NCAT, PERRL, EOMI. No conjuctival edema or icterus, no thrush.  No ETT or NGT/OGT  Neck: supple, trachea midline, no tracheostomy  Pulm: CTA, good air entry, equal bilaterally, no rales/rhonchi/wheezing, no accessory muscle use noted  Chest: mediastinal CT and bilat pleural CTs all with dressings intact and no air leak, no subQ emphysema       +PW (settings: VVI, rate: 50, mA: 20, threshold: 12, sensitivity: 2.0)  CV: regular rate, regular rhythm, +S1S2, no murmur or rub noted  Abd: soft, NT, ND, + BS  Ext: pitting generalized edema, no cyanosis or clubbing  Skin: warm, dry, well perfused  Incisions: midsternal C/D/I/stable w/ dressing, LLE C/D/I w/ dressing      Code Status: full code      Time spent: 37 minutes (includes patient assessment, examination, discharge planning, coordination of care, patient and family education and counseling)

## 2020-11-20 NOTE — PROGRESS NOTE ADULT - SUBJECTIVE AND OBJECTIVE BOX
[facility], [facility]                                                    [address], [address], [place] [address]                                                         PHONE: [phone]    FAX: [phone] [address], [address], [place] [address]                                                 PHONE: [phone]    FAX: [phone]  *******************************************************************************  cc: s/p CABG, valve surgery    HPI:  60y Male with past medical history significant for HTN, HLD, CAD, ascending aortic dissection s/p repair (2001), chronic diastolic HFpEF, moderate valvular heart disease including moderate aortic and mitral regurgitation, admitted with CP/SOB/elevated troponin. Had undergone CVVH. For perma cath today.  Also noted to have junctional rhythm post op for which PPM is planned.      Overnight events/Subjective Assessment: no new complaints.     INTERPRETATION OF TELEMETRY (personally reviewed): no further junctional rhythms overnight.    PAST MEDICAL & SURGICAL HISTORY:  CVA (cerebral vascular accident)    CHF (congestive heart failure)    H/O colectomy    Aorta disorder        penicillin (Unknown)      MEDICATIONS  (STANDING):  aMIOdarone IVPB 150 milliGRAM(s) IV Intermittent once  ascorbic acid 500 milliGRAM(s) Oral daily  aspirin enteric coated 81 milliGRAM(s) Oral daily  atorvastatin 40 milliGRAM(s) Oral at bedtime  chlorhexidine 2% Cloths 1 Application(s) Topical daily  folic acid 1 milliGRAM(s) Oral daily  levETIRAcetam 750 milliGRAM(s) Oral two times a day  melatonin 5 milliGRAM(s) Oral at bedtime  metoprolol tartrate 25 milliGRAM(s) Oral every 8 hours  multivitamin 1 Tablet(s) Oral daily  pantoprazole    Tablet 40 milliGRAM(s) Oral two times a day  QUEtiapine 25 milliGRAM(s) Oral at bedtime  senna 2 Tablet(s) Oral at bedtime  sodium chloride 0.9%. 1000 milliLiter(s) (10 mL/Hr) IV Continuous <Continuous>    MEDICATIONS  (PRN):  benzocaine 15 mG/menthol 3.6 mG (Sugar-Free) Lozenge 1 Lozenge Oral three times a day PRN Sore Throat  lidocaine   Patch 1 Patch Transdermal every 24 hours PRN as needed  oxyCODONE    IR 5 milliGRAM(s) Oral every 4 hours PRN Moderate Pain (4 - 6)  oxyCODONE    IR 10 milliGRAM(s) Oral every 4 hours PRN Severe Pain (7 - 10)      Vital Signs Last 24 Hrs  T(C): 36.7 (20 Nov 2020 00:17), Max: 37.1 (19 Nov 2020 22:23)  T(F): 98 (20 Nov 2020 00:17), Max: 98.7 (19 Nov 2020 22:23)  HR: 106 (20 Nov 2020 06:00) (102 - 106)  BP: --  BP(mean): --  RR: 17 (20 Nov 2020 06:00) (9 - 30)  SpO2: 96% (20 Nov 2020 06:00) (91% - 100%)    I&O's Detail    19 Nov 2020 07:01  -  20 Nov 2020 07:00  --------------------------------------------------------  IN:    Oral Fluid: 920 mL    Other (mL): 225 mL  Total IN: 1145 mL    OUT:    Chest Tube (mL): 480 mL    Chest Tube (mL): 20 mL    Chest Tube (mL): 310 mL  Total OUT: 810 mL    Total NET: 335 mL        I&O's Summary    19 Nov 2020 07:01  -  20 Nov 2020 07:00  --------------------------------------------------------  IN: 1145 mL / OUT: 810 mL / NET: 335 mL            PHYSICAL EXAM:  General: Appears well developed, well nourished, no acute distress. not in acute pain. in chair. 3 chest tubes in place  HEAD: normal cephalic. Atraumatic  PUPILS: equal and reactive to light  EARS: normal hearing  NECK: supple. no JVD or HJR. no carotid bruits. no visible lymphadenopathy  NOSE: no gross abnormalities  CHEST: symmetric chest wall expansion  CARDIOVASCULAR: Normal rate. Regular rhythm. Normal S1 and S2, no S3/S4,  no murmur, rub, or gallop  LUNGS: Normal effort. Normal respiratory rate. Breath sounds are clear to auscultation bilaterally. No respiratory distress. No stridor.  no rales, rhonchi or wheeze. no decreased Breath sounds  ABDOMEN: Soft, nontender, non-distended, positive bowel sounds, no mass or bruit. no abdominal tenderness. No rebound. no ascites  EXTREMITIES: No clubbing, cyanosis or edema. normal range of motion  PULSES:  distal pulses WNL  SKIN: Warm and dry with normal turgor. no visible rash or cyanosis   NEURO: Alert & oriented x 3, grossly intact with no focal weakness  PSYCH: normal mood and affect. Grossly normal insight and judgement exhibited    FAMILY HISTORY:  FH: hypertension. No family hx of ischemic heart disease mother, father or 1st degree relatives        SOCIAL HISTORY:  no active smoking. past cocaine/ETOH/opiod/benzo abuse    REVIEW OF SYSTEMS:  Constitutional: no fever, chills or malaise. No weight loss  Head: no trauma  Eyes: no visual deficit. No double vision  Ears: no hearing deficit or ringing in the ears  Nose: no nose bleeds or smell changes or congestion  Throat: no difficult swallowing or painful swallowing  Neck: supple. No lymphadenopathy or swelling  Respiratory: no SOB, wheeze, asthma, COPD. No cough. No blood in the sputum  Cardiovascular: no CP, palpitations, irregular heart beats. No edema. No PND. No orthopnea. No skin/temperature or color changes  Gastrointestinal: no abdominal pain. No constipation. No diarrhea. No melena. No nausea. No vomiting. No bloating  Genitourinary: no frequency or urgency. No hematuria  Lymphatics: no grossly swollen lymph nodes  Musculoskeletal: no limitation of range of motion. Normal strength. No pain  Integumentary: no visible rash. No itching  Neurologic: no HA. No TIA or stroke symptoms. No seizure. No hx of epilepsy. No tingling or numbness. No weakness. No dizziness  Psychiatric: denied. Reports appropriate mood.        LABS:                        9.3    7.63  )-----------( 72       ( 20 Nov 2020 03:13 )             28.8     11-20    133<L>  |  101  |  35.0<H>  ----------------------------<  105<H>  4.5   |  19.0<L>  |  2.62<H>    Ca    8.0<L>      20 Nov 2020 03:13  Phos  4.9     11-20  Mg     2.5     11-20          PT/INR - ( 20 Nov 2020 03:13 )   PT: 18.1 sec;   INR: 1.60 ratio         PTT - ( 20 Nov 2020 03:13 )  PTT:29.9 sec  serum  Lipids:     Thyroid Stimulating Hormone, Serum: 1.95 uIU/mL (11-03 @ 08:18)      RADIOLOGY & ADDITIONAL STUDIES:    < from: Xray Chest 1 View- PORTABLE-Routine (Xray Chest 1 View- PORTABLE-Routine in AM.) (11.19.20 @ 05:33) >  FINDINGS:   11/18/2020 chest radiograph available for review.  [A right IJ catheter sheath tip assembly in SVC.]  Mediastinal drains in place.  Bilateral chest tubes in place.    The lungs are clear of gross airspace consolidations or effusions. No pneumothorax.        The heart and mediastinum are within normal limits following cardiac  surgery.    Visualized osseous structures are intact.    < end of copied text >      ASSESSMENT AND PLAN:  In summary, JONATHAN GIBSON is a 60y Male with past medical history significant for     - Echocardiogram 11/4/20 EF 50-55%, grade II diastolic dysfunction, mildly reduced RV systolic function, severe LAE, mild KOLTON, mod-severe MR and AI, mod TR, dilated aortic root @ 4.2cm  - Cardiac cath 11/10/20: severe 2 vessel CAD.  Chronically occluded proximal RCA with collaterals from the LCA.  EF 50%.  Elevated LVEDP.  - s/p reop AVR 25mm CE 2700TFX, MVR 31mm Magna Ease, CABGx2 (DGV-OM, RPDA) with Dr. Butler 11/13/20  - Junctional rhythms post operatively. No events overnight. Plan for PPM with Dr Raygoza today  -RI. For Perma cath  - CAD. Now post op. continue ASA 81mg daily and lipitor 40mg daily  - DM. Glycemic control per medical  - CT removal as allowed by CTS. 3 CTubes in place  - Valvular heart disease. s/p reoperative AVR and now MVR. SBE prophylaxis for procedures that entail bacteremia  - Seizure disorder. Keppra in place  - Pain  management as per CTS    Katie Sin MD [facility], [facility]                                                    [address], [address], [place] [address]                                                         PHONE: [phone]    FAX: [phone] [address], [address], [place] [address]                                                 PHONE: [phone]    FAX: [phone]  *******************************************************************************  cc: s/p CABG, valve surgery    HPI:  60y Male with past medical history significant for HTN, HLD, CAD, ascending aortic dissection s/p repair (2001), chronic diastolic HFpEF, moderate valvular heart disease including moderate aortic and mitral regurgitation, admitted with CP/SOB/elevated troponin. Had undergone CVVH. For perma cath today.  Also noted to have junctional rhythm post op for which PPM is planned.      Overnight events/Subjective Assessment: no new complaints. no new complaints. sitting in chair. CT in place.    INTERPRETATION OF TELEMETRY (personally reviewed): no further junctional rhythms overnight.    PAST MEDICAL & SURGICAL HISTORY:  CVA (cerebral vascular accident)    CHF (congestive heart failure)    H/O colectomy    Aorta disorder        penicillin (Unknown)      MEDICATIONS  (STANDING):  aMIOdarone IVPB 150 milliGRAM(s) IV Intermittent once  ascorbic acid 500 milliGRAM(s) Oral daily  aspirin enteric coated 81 milliGRAM(s) Oral daily  atorvastatin 40 milliGRAM(s) Oral at bedtime  chlorhexidine 2% Cloths 1 Application(s) Topical daily  folic acid 1 milliGRAM(s) Oral daily  levETIRAcetam 750 milliGRAM(s) Oral two times a day  melatonin 5 milliGRAM(s) Oral at bedtime  metoprolol tartrate 25 milliGRAM(s) Oral every 8 hours  multivitamin 1 Tablet(s) Oral daily  pantoprazole    Tablet 40 milliGRAM(s) Oral two times a day  QUEtiapine 25 milliGRAM(s) Oral at bedtime  senna 2 Tablet(s) Oral at bedtime  sodium chloride 0.9%. 1000 milliLiter(s) (10 mL/Hr) IV Continuous <Continuous>    MEDICATIONS  (PRN):  benzocaine 15 mG/menthol 3.6 mG (Sugar-Free) Lozenge 1 Lozenge Oral three times a day PRN Sore Throat  lidocaine   Patch 1 Patch Transdermal every 24 hours PRN as needed  oxyCODONE    IR 5 milliGRAM(s) Oral every 4 hours PRN Moderate Pain (4 - 6)  oxyCODONE    IR 10 milliGRAM(s) Oral every 4 hours PRN Severe Pain (7 - 10)      Vital Signs Last 24 Hrs  T(C): 36.7 (20 Nov 2020 00:17), Max: 37.1 (19 Nov 2020 22:23)  T(F): 98 (20 Nov 2020 00:17), Max: 98.7 (19 Nov 2020 22:23)  HR: 106 (20 Nov 2020 06:00) (102 - 106)  BP: --  BP(mean): --  RR: 17 (20 Nov 2020 06:00) (9 - 30)  SpO2: 96% (20 Nov 2020 06:00) (91% - 100%)    I&O's Detail    19 Nov 2020 07:01  -  20 Nov 2020 07:00  --------------------------------------------------------  IN:    Oral Fluid: 920 mL    Other (mL): 225 mL  Total IN: 1145 mL    OUT:    Chest Tube (mL): 480 mL    Chest Tube (mL): 20 mL    Chest Tube (mL): 310 mL  Total OUT: 810 mL    Total NET: 335 mL        I&O's Summary    19 Nov 2020 07:01  -  20 Nov 2020 07:00  --------------------------------------------------------  IN: 1145 mL / OUT: 810 mL / NET: 335 mL            PHYSICAL EXAM:  General: Appears well developed, well nourished, no acute distress. not in acute pain. in chair. 3 chest tubes in place  HEAD: normal cephalic. Atraumatic  PUPILS: equal and reactive to light  EARS: normal hearing  NECK: supple. no JVD or HJR. no carotid bruits. no visible lymphadenopathy  NOSE: no gross abnormalities  CHEST: symmetric chest wall expansion  CARDIOVASCULAR: Normal rate. Regular rhythm. Normal S1 and S2, no S3/S4,  no murmur, rub, or gallop  LUNGS: Normal effort. Normal respiratory rate. Breath sounds are clear to auscultation bilaterally. No respiratory distress. No stridor.  no rales, rhonchi or wheeze. no decreased Breath sounds  ABDOMEN: Soft, nontender, non-distended, positive bowel sounds, no mass or bruit. no abdominal tenderness. No rebound. no ascites  EXTREMITIES: No clubbing, cyanosis or edema. normal range of motion  PULSES:  distal pulses WNL  SKIN: Warm and dry with normal turgor. no visible rash or cyanosis   NEURO: Alert & oriented x 3, grossly intact with no focal weakness  PSYCH: normal mood and affect. Grossly normal insight and judgement exhibited    FAMILY HISTORY:  FH: hypertension. No family hx of ischemic heart disease mother, father or 1st degree relatives        SOCIAL HISTORY:  no active smoking. past cocaine/ETOH/opiod/benzo abuse    REVIEW OF SYSTEMS:  Constitutional: no fever, chills or malaise. No weight loss  Head: no trauma  Eyes: no visual deficit. No double vision  Ears: no hearing deficit or ringing in the ears  Nose: no nose bleeds or smell changes or congestion  Throat: no difficult swallowing or painful swallowing  Neck: supple. No lymphadenopathy or swelling  Respiratory: no SOB, wheeze, asthma, COPD. No cough. No blood in the sputum  Cardiovascular: no CP, palpitations, irregular heart beats. No edema. No PND. No orthopnea. No skin/temperature or color changes  Gastrointestinal: no abdominal pain. No constipation. No diarrhea. No melena. No nausea. No vomiting. No bloating  Genitourinary: no frequency or urgency. No hematuria  Lymphatics: no grossly swollen lymph nodes  Musculoskeletal: no limitation of range of motion. Normal strength. No pain  Integumentary: no visible rash. No itching  Neurologic: no HA. No TIA or stroke symptoms. No seizure. No hx of epilepsy. No tingling or numbness. No weakness. No dizziness  Psychiatric: denied. Reports appropriate mood.        LABS:                        9.3    7.63  )-----------( 72       ( 20 Nov 2020 03:13 )             28.8     11-20    133<L>  |  101  |  35.0<H>  ----------------------------<  105<H>  4.5   |  19.0<L>  |  2.62<H>    Ca    8.0<L>      20 Nov 2020 03:13  Phos  4.9     11-20  Mg     2.5     11-20          PT/INR - ( 20 Nov 2020 03:13 )   PT: 18.1 sec;   INR: 1.60 ratio         PTT - ( 20 Nov 2020 03:13 )  PTT:29.9 sec  serum  Lipids:     Thyroid Stimulating Hormone, Serum: 1.95 uIU/mL (11-03 @ 08:18)      RADIOLOGY & ADDITIONAL STUDIES:    < from: Xray Chest 1 View- PORTABLE-Routine (Xray Chest 1 View- PORTABLE-Routine in AM.) (11.19.20 @ 05:33) >  FINDINGS:   11/18/2020 chest radiograph available for review.  [A right IJ catheter sheath tip assembly in SVC.]  Mediastinal drains in place.  Bilateral chest tubes in place.    The lungs are clear of gross airspace consolidations or effusions. No pneumothorax.        The heart and mediastinum are within normal limits following cardiac  surgery.    Visualized osseous structures are intact.    < end of copied text >      ASSESSMENT AND PLAN:  In summary, JONATHAN GIBSON is a 60y Male with past medical history significant for     - Echocardiogram 11/4/20 EF 50-55%, grade II diastolic dysfunction, mildly reduced RV systolic function, severe LAE, mild KOLTON, mod-severe MR and AI, mod TR, dilated aortic root @ 4.2cm  - Cardiac cath 11/10/20: severe 2 vessel CAD.  Chronically occluded proximal RCA with collaterals from the LCA.  EF 50%.  Elevated LVEDP.  - s/p reop AVR 25mm CE 2700TFX, MVR 31mm Magna Ease, CABGx2 (DGV-OM, RPDA) with Dr. Butler 11/13/20  - Junctional rhythms post operatively. No events overnight. Plan for PPM with Dr Raygoza today  -RI. For Perma cath  - CAD. Now post op. continue ASA 81mg daily and lipitor 40mg daily  - DM. Glycemic control per medical  - CT removal as allowed by CTS. 3 CTubes in place  - Valvular heart disease. s/p reoperative AVR and now MVR. SBE prophylaxis for procedures that entail bacteremia  - Seizure disorder. Keppra in place  - Pain  management as per CTS  - Telemetry monitoring personally reviewed by me. SR  - radiologic imaging reviewed  - Laboratory data reviewed.  - I personally spoke with nursing at the bedside. No overnight issues. Perma cath today and PPM  - I have personally reviewed all obtainable prior records and data    Katie Sin MD [facility], [facility]                                                    [address], [address], [place] [address]                                                         PHONE: [phone]    FAX: [phone] [address], [address], [place] [address]                                                 PHONE: [phone]    FAX: [phone]  *******************************************************************************  cc: s/p CABG, valve surgery    HPI:  60y Male with past medical history significant for HTN, HLD, CAD, ascending aortic dissection s/p repair (2001), chronic diastolic HFpEF, moderate valvular heart disease including moderate aortic and mitral regurgitation, admitted with CP/SOB/elevated troponin. Had undergone CVVH. For perma cath today.  Also noted to have junctional rhythm post op for which PPM is planned.      Overnight events/Subjective Assessment: no new complaints. no new complaints. sitting in chair. CT in place.    INTERPRETATION OF TELEMETRY (personally reviewed): no further junctional rhythms overnight. SR LBBB    PAST MEDICAL & SURGICAL HISTORY:  CVA (cerebral vascular accident)    CHF (congestive heart failure)    H/O colectomy    Aorta disorder        penicillin (Unknown)      MEDICATIONS  (STANDING):  aMIOdarone IVPB 150 milliGRAM(s) IV Intermittent once  ascorbic acid 500 milliGRAM(s) Oral daily  aspirin enteric coated 81 milliGRAM(s) Oral daily  atorvastatin 40 milliGRAM(s) Oral at bedtime  chlorhexidine 2% Cloths 1 Application(s) Topical daily  folic acid 1 milliGRAM(s) Oral daily  levETIRAcetam 750 milliGRAM(s) Oral two times a day  melatonin 5 milliGRAM(s) Oral at bedtime  metoprolol tartrate 25 milliGRAM(s) Oral every 8 hours  multivitamin 1 Tablet(s) Oral daily  pantoprazole    Tablet 40 milliGRAM(s) Oral two times a day  QUEtiapine 25 milliGRAM(s) Oral at bedtime  senna 2 Tablet(s) Oral at bedtime  sodium chloride 0.9%. 1000 milliLiter(s) (10 mL/Hr) IV Continuous <Continuous>    MEDICATIONS  (PRN):  benzocaine 15 mG/menthol 3.6 mG (Sugar-Free) Lozenge 1 Lozenge Oral three times a day PRN Sore Throat  lidocaine   Patch 1 Patch Transdermal every 24 hours PRN as needed  oxyCODONE    IR 5 milliGRAM(s) Oral every 4 hours PRN Moderate Pain (4 - 6)  oxyCODONE    IR 10 milliGRAM(s) Oral every 4 hours PRN Severe Pain (7 - 10)      Vital Signs Last 24 Hrs  T(C): 36.7 (20 Nov 2020 00:17), Max: 37.1 (19 Nov 2020 22:23)  T(F): 98 (20 Nov 2020 00:17), Max: 98.7 (19 Nov 2020 22:23)  HR: 106 (20 Nov 2020 06:00) (102 - 106)  BP: --  BP(mean): --  RR: 17 (20 Nov 2020 06:00) (9 - 30)  SpO2: 96% (20 Nov 2020 06:00) (91% - 100%)    I&O's Detail    19 Nov 2020 07:01  -  20 Nov 2020 07:00  --------------------------------------------------------  IN:    Oral Fluid: 920 mL    Other (mL): 225 mL  Total IN: 1145 mL    OUT:    Chest Tube (mL): 480 mL    Chest Tube (mL): 20 mL    Chest Tube (mL): 310 mL  Total OUT: 810 mL    Total NET: 335 mL        I&O's Summary    19 Nov 2020 07:01  -  20 Nov 2020 07:00  --------------------------------------------------------  IN: 1145 mL / OUT: 810 mL / NET: 335 mL            PHYSICAL EXAM:  General: Appears well developed, well nourished, no acute distress. not in acute pain. in chair. 3 chest tubes in place  HEAD: normal cephalic. Atraumatic  PUPILS: equal and reactive to light  EARS: normal hearing  NECK: supple. no JVD or HJR. no carotid bruits. no visible lymphadenopathy  NOSE: no gross abnormalities  CHEST: symmetric chest wall expansion  CARDIOVASCULAR: Normal rate. Regular rhythm. Normal S1 and S2, no S3/S4,  no murmur, rub, or gallop  LUNGS: Normal effort. Normal respiratory rate. Breath sounds are clear to auscultation bilaterally. No respiratory distress. No stridor.  no rales, rhonchi or wheeze. no decreased Breath sounds  ABDOMEN: Soft, nontender, non-distended, positive bowel sounds, no mass or bruit. no abdominal tenderness. No rebound. no ascites  EXTREMITIES: No clubbing, cyanosis or edema. normal range of motion  PULSES:  distal pulses WNL  SKIN: Warm and dry with normal turgor. no visible rash or cyanosis   NEURO: Alert & oriented x 3, grossly intact with no focal weakness  PSYCH: normal mood and affect. Grossly normal insight and judgement exhibited    FAMILY HISTORY:  FH: hypertension. No family hx of ischemic heart disease mother, father or 1st degree relatives        SOCIAL HISTORY:  no active smoking. past cocaine/ETOH/opiod/benzo abuse    REVIEW OF SYSTEMS:  Constitutional: no fever, chills or malaise. No weight loss  Head: no trauma  Eyes: no visual deficit. No double vision  Ears: no hearing deficit or ringing in the ears  Nose: no nose bleeds or smell changes or congestion  Throat: no difficult swallowing or painful swallowing  Neck: supple. No lymphadenopathy or swelling  Respiratory: no SOB, wheeze, asthma, COPD. No cough. No blood in the sputum  Cardiovascular: no CP, palpitations, irregular heart beats. No edema. No PND. No orthopnea. No skin/temperature or color changes  Gastrointestinal: no abdominal pain. No constipation. No diarrhea. No melena. No nausea. No vomiting. No bloating  Genitourinary: no frequency or urgency. No hematuria  Lymphatics: no grossly swollen lymph nodes  Musculoskeletal: no limitation of range of motion. Normal strength. No pain  Integumentary: no visible rash. No itching  Neurologic: no HA. No TIA or stroke symptoms. No seizure. No hx of epilepsy. No tingling or numbness. No weakness. No dizziness  Psychiatric: denied. Reports appropriate mood.        LABS:                        9.3    7.63  )-----------( 72       ( 20 Nov 2020 03:13 )             28.8     11-20    133<L>  |  101  |  35.0<H>  ----------------------------<  105<H>  4.5   |  19.0<L>  |  2.62<H>    Ca    8.0<L>      20 Nov 2020 03:13  Phos  4.9     11-20  Mg     2.5     11-20          PT/INR - ( 20 Nov 2020 03:13 )   PT: 18.1 sec;   INR: 1.60 ratio         PTT - ( 20 Nov 2020 03:13 )  PTT:29.9 sec  serum  Lipids:     Thyroid Stimulating Hormone, Serum: 1.95 uIU/mL (11-03 @ 08:18)      RADIOLOGY & ADDITIONAL STUDIES:    < from: Xray Chest 1 View- PORTABLE-Routine (Xray Chest 1 View- PORTABLE-Routine in AM.) (11.19.20 @ 05:33) >  FINDINGS:   11/18/2020 chest radiograph available for review.  [A right IJ catheter sheath tip assembly in SVC.]  Mediastinal drains in place.  Bilateral chest tubes in place.    The lungs are clear of gross airspace consolidations or effusions. No pneumothorax.        The heart and mediastinum are within normal limits following cardiac  surgery.    Visualized osseous structures are intact.    < end of copied text >      ASSESSMENT AND PLAN:  In summary, JONATHAN GIBSON is a 60y Male with past medical history significant for     - Echocardiogram 11/4/20 EF 50-55%, grade II diastolic dysfunction, mildly reduced RV systolic function, severe LAE, mild KOLTON, mod-severe MR and AI, mod TR, dilated aortic root @ 4.2cm  - Cardiac cath 11/10/20: severe 2 vessel CAD.  Chronically occluded proximal RCA with collaterals from the LCA.  EF 50%.  Elevated LVEDP.  - s/p reop AVR 25mm CE 2700TFX, MVR 31mm Magna Ease, CABGx2 (DGV-OM, RPDA) with Dr. Butler 11/13/20  - Junctional rhythms post operatively. No events overnight. Plan for PPM with Dr Raygoza today  - LBBB noted.  - SANTOS/CRI. For Perma cath  - CAD. Now post op. continue ASA 81mg daily and lipitor 40mg daily  - DM. Glycemic control per medical  - CT removal as allowed by CTS. 3 CTubes in place  - Valvular heart disease. s/p reoperative AVR and now MVR. SBE prophylaxis for procedures that entail bacteremia  - Seizure disorder. Keppra in place  - Low platelets. No evidence of active bleeding. Continue to monitor  - Pain  management as per CTS  - Telemetry monitoring personally reviewed by me. SR  - radiologic imaging reviewed  - Laboratory data reviewed.  - I personally spoke with nursing at the bedside. No overnight issues. Perma cath today and PPM  - I have personally reviewed all obtainable prior records and data    We will follow with you    Katie Sin MD [facility], [facility]                                                    [address], [address], [place] [address]                                                         PHONE: [phone]    FAX: [phone] [address], [address], [place] [address]                                                 PHONE: [phone]    FAX: [phone]  *******************************************************************************  cc: s/p CABG, valve surgery    HPI:  60y Male with past medical history significant for HTN, HLD, CAD, ascending aortic dissection s/p repair (2001), chronic diastolic HFpEF, moderate valvular heart disease including moderate aortic and mitral regurgitation, admitted with CP/SOB/elevated troponin. Had undergone CVVH. For perma cath today.  Also noted to have junctional rhythm post op for which PPM is planned.      Overnight events/Subjective Assessment: no new complaints. no new complaints. sitting in chair. CT in place.    INTERPRETATION OF TELEMETRY (personally reviewed): no further junctional rhythms overnight. SR LBBB    PAST MEDICAL & SURGICAL HISTORY:  CVA (cerebral vascular accident)    CHF (congestive heart failure)    H/O colectomy    Aorta disorder        penicillin (Unknown)      MEDICATIONS  (STANDING):  aMIOdarone IVPB 150 milliGRAM(s) IV Intermittent once  ascorbic acid 500 milliGRAM(s) Oral daily  aspirin enteric coated 81 milliGRAM(s) Oral daily  atorvastatin 40 milliGRAM(s) Oral at bedtime  chlorhexidine 2% Cloths 1 Application(s) Topical daily  folic acid 1 milliGRAM(s) Oral daily  levETIRAcetam 750 milliGRAM(s) Oral two times a day  melatonin 5 milliGRAM(s) Oral at bedtime  metoprolol tartrate 25 milliGRAM(s) Oral every 8 hours  multivitamin 1 Tablet(s) Oral daily  pantoprazole    Tablet 40 milliGRAM(s) Oral two times a day  QUEtiapine 25 milliGRAM(s) Oral at bedtime  senna 2 Tablet(s) Oral at bedtime  sodium chloride 0.9%. 1000 milliLiter(s) (10 mL/Hr) IV Continuous <Continuous>    MEDICATIONS  (PRN):  benzocaine 15 mG/menthol 3.6 mG (Sugar-Free) Lozenge 1 Lozenge Oral three times a day PRN Sore Throat  lidocaine   Patch 1 Patch Transdermal every 24 hours PRN as needed  oxyCODONE    IR 5 milliGRAM(s) Oral every 4 hours PRN Moderate Pain (4 - 6)  oxyCODONE    IR 10 milliGRAM(s) Oral every 4 hours PRN Severe Pain (7 - 10)      Vital Signs Last 24 Hrs  T(C): 36.7 (20 Nov 2020 00:17), Max: 37.1 (19 Nov 2020 22:23)  T(F): 98 (20 Nov 2020 00:17), Max: 98.7 (19 Nov 2020 22:23)  HR: 106 (20 Nov 2020 06:00) (102 - 106)  BP: --  BP(mean): --  RR: 17 (20 Nov 2020 06:00) (9 - 30)  SpO2: 96% (20 Nov 2020 06:00) (91% - 100%)    I&O's Detail    19 Nov 2020 07:01  -  20 Nov 2020 07:00  --------------------------------------------------------  IN:    Oral Fluid: 920 mL    Other (mL): 225 mL  Total IN: 1145 mL    OUT:    Chest Tube (mL): 480 mL    Chest Tube (mL): 20 mL    Chest Tube (mL): 310 mL  Total OUT: 810 mL    Total NET: 335 mL        I&O's Summary    19 Nov 2020 07:01  -  20 Nov 2020 07:00  --------------------------------------------------------  IN: 1145 mL / OUT: 810 mL / NET: 335 mL            PHYSICAL EXAM:  General: Appears well developed, well nourished, no acute distress. not in acute pain. in chair. 3 chest tubes in place  HEAD: normal cephalic. Atraumatic  PUPILS: equal and reactive to light  EARS: normal hearing  NECK: supple. no JVD or HJR. no carotid bruits. no visible lymphadenopathy  NOSE: no gross abnormalities  CHEST: symmetric chest wall expansion  CARDIOVASCULAR: Normal rate. Regular rhythm. Normal S1 and S2, no S3/S4,  no murmur, rub, or gallop  LUNGS: Normal effort. Normal respiratory rate. Breath sounds are clear to auscultation bilaterally. No respiratory distress. No stridor.  no rales, rhonchi or wheeze. no decreased Breath sounds  ABDOMEN: Soft, nontender, non-distended, positive bowel sounds, no mass or bruit. no abdominal tenderness. No rebound. no ascites  EXTREMITIES: No clubbing, cyanosis or edema. normal range of motion  PULSES:  distal pulses WNL  SKIN: Warm and dry with normal turgor. no visible rash or cyanosis   NEURO: Alert & oriented x 3, grossly intact with no focal weakness  PSYCH: normal mood and affect. Grossly normal insight and judgement exhibited    FAMILY HISTORY:  FH: hypertension. No family hx of ischemic heart disease mother, father or 1st degree relatives        SOCIAL HISTORY:  no active smoking. past cocaine/ETOH/opiod/benzo abuse    REVIEW OF SYSTEMS:  Constitutional: no fever, chills or malaise. No weight loss  Head: no trauma  Eyes: no visual deficit. No double vision  Ears: no hearing deficit or ringing in the ears  Nose: no nose bleeds or smell changes or congestion  Throat: no difficult swallowing or painful swallowing  Neck: supple. No lymphadenopathy or swelling  Respiratory: no SOB, wheeze, asthma, COPD. No cough. No blood in the sputum  Cardiovascular: no CP, palpitations, irregular heart beats. No edema. No PND. No orthopnea. No skin/temperature or color changes  Gastrointestinal: no abdominal pain. No constipation. No diarrhea. No melena. No nausea. No vomiting. No bloating  Genitourinary: no frequency or urgency. No hematuria  Lymphatics: no grossly swollen lymph nodes  Musculoskeletal: no limitation of range of motion. Normal strength. No pain  Integumentary: no visible rash. No itching  Neurologic: no HA. No TIA or stroke symptoms. No seizure. No hx of epilepsy. No tingling or numbness. No weakness. No dizziness  Psychiatric: denied. Reports appropriate mood.        LABS:                        9.3    7.63  )-----------( 72       ( 20 Nov 2020 03:13 )             28.8     11-20    133<L>  |  101  |  35.0<H>  ----------------------------<  105<H>  4.5   |  19.0<L>  |  2.62<H>    Ca    8.0<L>      20 Nov 2020 03:13  Phos  4.9     11-20  Mg     2.5     11-20          PT/INR - ( 20 Nov 2020 03:13 )   PT: 18.1 sec;   INR: 1.60 ratio         PTT - ( 20 Nov 2020 03:13 )  PTT:29.9 sec  serum  Lipids:     Thyroid Stimulating Hormone, Serum: 1.95 uIU/mL (11-03 @ 08:18)      RADIOLOGY & ADDITIONAL STUDIES:    < from: Xray Chest 1 View- PORTABLE-Routine (Xray Chest 1 View- PORTABLE-Routine in AM.) (11.19.20 @ 05:33) >  FINDINGS:   11/18/2020 chest radiograph available for review.  [A right IJ catheter sheath tip assembly in SVC.]  Mediastinal drains in place.  Bilateral chest tubes in place.    The lungs are clear of gross airspace consolidations or effusions. No pneumothorax.        The heart and mediastinum are within normal limits following cardiac  surgery.    Visualized osseous structures are intact.    < end of copied text >      ASSESSMENT AND PLAN:  In summary, JONATHAN GIBSON is a 60y Male with past medical history significant for     - Echocardiogram 11/4/20 EF 50-55%, grade II diastolic dysfunction, mildly reduced RV systolic function, severe LAE, mild KOLTON, mod-severe MR and AI, mod TR, dilated aortic root @ 4.2cm  - Cardiac cath 11/10/20: severe 2 vessel CAD.  Chronically occluded proximal RCA with collaterals from the LCA.  EF 50%.  Elevated LVEDP.  - s/p reop AVR 25mm CE 2700TFX, MVR 31mm Magna Ease, CABGx2 (DGV-OM, RPDA) with Dr. Butler 11/13/20  - Junctional rhythms post operatively. No events overnight. On metoprolol 25mg po q8 due to CAD and valvular heart disease. Plan for PPM with Dr Raygoza today  - LBBB noted.  - SANTOS/CRI. For Perma cath  - CAD. Now post op. continue ASA 81mg daily and lipitor 40mg daily  - DM. Glycemic control per medical  - CT removal as allowed by CTS. 3 CTubes in place  - Valvular heart disease. s/p reoperative AVR and now MVR. SBE prophylaxis for procedures that entail bacteremia  - Seizure disorder. Keppra in place  - Low platelets. No evidence of active bleeding. Continue to monitor  - Pain  management as per CTS  - Telemetry monitoring personally reviewed by me. SR  - radiologic imaging reviewed  - Laboratory data reviewed.  - I personally spoke with nursing at the bedside. No overnight issues. Perma cath today and PPM  - I have personally reviewed all obtainable prior records and data    We will follow with you    Katie Sin MD [facility], [facility]                                                    [address], [address], [place] [address]                                                         PHONE: [phone]    FAX: [phone] [address], [address], [place] [address]                                                 PHONE: [phone]    FAX: [phone]  *******************************************************************************  cc: s/p CABG, valve surgery    HPI:  60y Male with past medical history significant for HTN, HLD, CAD, ascending aortic dissection s/p repair (2001), chronic diastolic HFpEF, moderate valvular heart disease including moderate aortic and mitral regurgitation, admitted with CP/SOB/elevated troponin. Had undergone CVVH. For perma cath today.  Also noted to have junctional rhythm post op for which PPM is planned.      Overnight events/Subjective Assessment: no new complaints. no new complaints. sitting in chair. CT in place.    INTERPRETATION OF TELEMETRY (personally reviewed): no further junctional rhythms overnight. SR LBBB    PAST MEDICAL & SURGICAL HISTORY:  CVA (cerebral vascular accident)    CHF (congestive heart failure)    H/O colectomy    Aorta disorder        penicillin (Unknown)      MEDICATIONS  (STANDING):  aMIOdarone IVPB 150 milliGRAM(s) IV Intermittent once  ascorbic acid 500 milliGRAM(s) Oral daily  aspirin enteric coated 81 milliGRAM(s) Oral daily  atorvastatin 40 milliGRAM(s) Oral at bedtime  chlorhexidine 2% Cloths 1 Application(s) Topical daily  folic acid 1 milliGRAM(s) Oral daily  levETIRAcetam 750 milliGRAM(s) Oral two times a day  melatonin 5 milliGRAM(s) Oral at bedtime  metoprolol tartrate 25 milliGRAM(s) Oral every 8 hours  multivitamin 1 Tablet(s) Oral daily  pantoprazole    Tablet 40 milliGRAM(s) Oral two times a day  QUEtiapine 25 milliGRAM(s) Oral at bedtime  senna 2 Tablet(s) Oral at bedtime  sodium chloride 0.9%. 1000 milliLiter(s) (10 mL/Hr) IV Continuous <Continuous>    MEDICATIONS  (PRN):  benzocaine 15 mG/menthol 3.6 mG (Sugar-Free) Lozenge 1 Lozenge Oral three times a day PRN Sore Throat  lidocaine   Patch 1 Patch Transdermal every 24 hours PRN as needed  oxyCODONE    IR 5 milliGRAM(s) Oral every 4 hours PRN Moderate Pain (4 - 6)  oxyCODONE    IR 10 milliGRAM(s) Oral every 4 hours PRN Severe Pain (7 - 10)      Vital Signs Last 24 Hrs  T(C): 36.7 (20 Nov 2020 00:17), Max: 37.1 (19 Nov 2020 22:23)  T(F): 98 (20 Nov 2020 00:17), Max: 98.7 (19 Nov 2020 22:23)  HR: 106 (20 Nov 2020 06:00) (102 - 106)  BP: --  BP(mean): --  RR: 17 (20 Nov 2020 06:00) (9 - 30)  SpO2: 96% (20 Nov 2020 06:00) (91% - 100%)    I&O's Detail    19 Nov 2020 07:01  -  20 Nov 2020 07:00  --------------------------------------------------------  IN:    Oral Fluid: 920 mL    Other (mL): 225 mL  Total IN: 1145 mL    OUT:    Chest Tube (mL): 480 mL    Chest Tube (mL): 20 mL    Chest Tube (mL): 310 mL  Total OUT: 810 mL    Total NET: 335 mL        I&O's Summary    19 Nov 2020 07:01  -  20 Nov 2020 07:00  --------------------------------------------------------  IN: 1145 mL / OUT: 810 mL / NET: 335 mL            PHYSICAL EXAM:  General: Appears well developed, well nourished, no acute distress. not in acute pain. in chair. 3 chest tubes in place  HEAD: normal cephalic. Atraumatic  PUPILS: equal and reactive to light  EARS: normal hearing  NECK: supple. no JVD or HJR. no carotid bruits. no visible lymphadenopathy  NOSE: no gross abnormalities  CHEST: symmetric chest wall expansion  CARDIOVASCULAR: Normal rate. Regular rhythm. Normal S1 and S2, no S3/S4,  no murmur, rub, or gallop  LUNGS: Normal effort. Normal respiratory rate. Breath sounds are clear to auscultation bilaterally. No respiratory distress. No stridor.  no rales, rhonchi or wheeze. no decreased Breath sounds  ABDOMEN: Soft, nontender, non-distended, positive bowel sounds, no mass or bruit. no abdominal tenderness. No rebound. no ascites  EXTREMITIES: No clubbing, cyanosis or edema. normal range of motion  PULSES:  distal pulses WNL  SKIN: Warm and dry with normal turgor. no visible rash or cyanosis   NEURO: Alert & oriented x 3, grossly intact with no focal weakness  PSYCH: normal mood and affect. Grossly normal insight and judgement exhibited    FAMILY HISTORY:  FH: hypertension. No family hx of ischemic heart disease mother, father or 1st degree relatives        SOCIAL HISTORY:  no active smoking. past cocaine/ETOH/opiod/benzo abuse    REVIEW OF SYSTEMS:  Constitutional: no fever, chills or malaise. No weight loss  Head: no trauma  Eyes: no visual deficit. No double vision  Ears: no hearing deficit or ringing in the ears  Nose: no nose bleeds or smell changes or congestion  Throat: no difficult swallowing or painful swallowing  Neck: supple. No lymphadenopathy or swelling  Respiratory: no SOB, wheeze, asthma, COPD. No cough. No blood in the sputum  Cardiovascular: no CP, palpitations, irregular heart beats. No edema. No PND. No orthopnea. No skin/temperature or color changes  Gastrointestinal: no abdominal pain. No constipation. No diarrhea. No melena. No nausea. No vomiting. No bloating  Genitourinary: no frequency or urgency. No hematuria  Lymphatics: no grossly swollen lymph nodes  Musculoskeletal: no limitation of range of motion. Normal strength. No pain  Integumentary: no visible rash. No itching  Neurologic: no HA. No TIA or stroke symptoms. No seizure. No hx of epilepsy. No tingling or numbness. No weakness. No dizziness  Psychiatric: denied. Reports appropriate mood.        LABS:                        9.3    7.63  )-----------( 72       ( 20 Nov 2020 03:13 )             28.8     11-20    133<L>  |  101  |  35.0<H>  ----------------------------<  105<H>  4.5   |  19.0<L>  |  2.62<H>    Ca    8.0<L>      20 Nov 2020 03:13  Phos  4.9     11-20  Mg     2.5     11-20          PT/INR - ( 20 Nov 2020 03:13 )   PT: 18.1 sec;   INR: 1.60 ratio         PTT - ( 20 Nov 2020 03:13 )  PTT:29.9 sec  serum  Lipids:     Thyroid Stimulating Hormone, Serum: 1.95 uIU/mL (11-03 @ 08:18)      RADIOLOGY & ADDITIONAL STUDIES:    < from: Xray Chest 1 View- PORTABLE-Routine (Xray Chest 1 View- PORTABLE-Routine in AM.) (11.19.20 @ 05:33) >  FINDINGS:   11/18/2020 chest radiograph available for review.  [A right IJ catheter sheath tip assembly in SVC.]  Mediastinal drains in place.  Bilateral chest tubes in place.    The lungs are clear of gross airspace consolidations or effusions. No pneumothorax.        The heart and mediastinum are within normal limits following cardiac  surgery.    Visualized osseous structures are intact.    < end of copied text >      ASSESSMENT AND PLAN:  In summary, JONATHAN GIBSON is a 60y Male with past medical history significant for     - Echocardiogram 11/4/20 EF 50-55%, grade II diastolic dysfunction, mildly reduced RV systolic function, severe LAE, mild KOLTON, mod-severe MR and AI, mod TR, dilated aortic root @ 4.2cm  - Cardiac cath 11/10/20: severe 2 vessel CAD.  Chronically occluded proximal RCA with collaterals from the LCA.  EF 50%.  Elevated LVEDP.  - s/p reop AVR 25mm CE 2700TFX, MVR 31mm Magna Ease, CABGx2 (DGV-OM, RPDA) with Dr. Butler 11/13/20  - Junctional rhythms post operatively. No events overnight. On metoprolol 25mg po q8 due to CAD and valvular heart disease. Plan for PPM with Dr Raygoza today  - LBBB noted.  - SANTOS/CRI. For Perma cath  - CAD. Now post op. continue ASA 81mg daily and lipitor 40mg daily  - DM. Glycemic control per medical  - HL. atorvastatin 40mg daily  - BP is controlled and hemodynamics are stable  - CT removal as allowed by CTS. 3 CTubes in place  - Valvular heart disease. s/p reoperative AVR and now MVR. SBE prophylaxis for procedures that entail bacteremia  - Seizure disorder. Keppra in place  - Low platelets. No evidence of active bleeding. Continue to monitor  - Pain  management as per CTS  - Telemetry monitoring personally reviewed by me. SR  - radiologic imaging reviewed  - Laboratory data reviewed.  - I personally spoke with nursing at the bedside. No overnight issues. Perma cath today and PPM  - I have personally reviewed all obtainable prior records and data    We will follow with you    Katie Sin MD [facility], [facility]                                                    [address], [address], [place] [address]                                                         PHONE: [phone]    FAX: [phone] [address], [address], [place] [address]                                                 PHONE: [phone]    FAX: [phone]  *******************************************************************************  cc: s/p CABG, valve surgery    HPI:  60y Male with past medical history significant for HTN, HLD, CAD, ascending aortic dissection s/p repair (2001), chronic diastolic HFpEF, moderate valvular heart disease including moderate aortic and mitral regurgitation, admitted with CP/SOB/elevated troponin. Had undergone CVVH. For perma cath today.  Also noted to have junctional rhythm post op for which PPM is planned.      Overnight events/Subjective Assessment: no new complaints. no new complaints. sitting in chair. CT in place.    INTERPRETATION OF TELEMETRY (personally reviewed): no further junctional rhythms overnight. SR LBBB    PAST MEDICAL & SURGICAL HISTORY:  CVA (cerebral vascular accident)    CHF (congestive heart failure)    H/O colectomy    Aorta disorder        penicillin (Unknown)      MEDICATIONS  (STANDING):  aMIOdarone IVPB 150 milliGRAM(s) IV Intermittent once  ascorbic acid 500 milliGRAM(s) Oral daily  aspirin enteric coated 81 milliGRAM(s) Oral daily  atorvastatin 40 milliGRAM(s) Oral at bedtime  chlorhexidine 2% Cloths 1 Application(s) Topical daily  folic acid 1 milliGRAM(s) Oral daily  levETIRAcetam 750 milliGRAM(s) Oral two times a day  melatonin 5 milliGRAM(s) Oral at bedtime  metoprolol tartrate 25 milliGRAM(s) Oral every 8 hours  multivitamin 1 Tablet(s) Oral daily  pantoprazole    Tablet 40 milliGRAM(s) Oral two times a day  QUEtiapine 25 milliGRAM(s) Oral at bedtime  senna 2 Tablet(s) Oral at bedtime  sodium chloride 0.9%. 1000 milliLiter(s) (10 mL/Hr) IV Continuous <Continuous>    MEDICATIONS  (PRN):  benzocaine 15 mG/menthol 3.6 mG (Sugar-Free) Lozenge 1 Lozenge Oral three times a day PRN Sore Throat  lidocaine   Patch 1 Patch Transdermal every 24 hours PRN as needed  oxyCODONE    IR 5 milliGRAM(s) Oral every 4 hours PRN Moderate Pain (4 - 6)  oxyCODONE    IR 10 milliGRAM(s) Oral every 4 hours PRN Severe Pain (7 - 10)      Vital Signs Last 24 Hrs  T(C): 36.7 (20 Nov 2020 00:17), Max: 37.1 (19 Nov 2020 22:23)  T(F): 98 (20 Nov 2020 00:17), Max: 98.7 (19 Nov 2020 22:23)  HR: 106 (20 Nov 2020 06:00) (102 - 106)  BP: --  BP(mean): --  RR: 17 (20 Nov 2020 06:00) (9 - 30)  SpO2: 96% (20 Nov 2020 06:00) (91% - 100%)    I&O's Detail    19 Nov 2020 07:01  -  20 Nov 2020 07:00  --------------------------------------------------------  IN:    Oral Fluid: 920 mL    Other (mL): 225 mL  Total IN: 1145 mL    OUT:    Chest Tube (mL): 480 mL    Chest Tube (mL): 20 mL    Chest Tube (mL): 310 mL  Total OUT: 810 mL    Total NET: 335 mL        I&O's Summary    19 Nov 2020 07:01  -  20 Nov 2020 07:00  --------------------------------------------------------  IN: 1145 mL / OUT: 810 mL / NET: 335 mL            PHYSICAL EXAM:  General: Appears well developed, well nourished, no acute distress. not in acute pain. in chair. 3 chest tubes in place  HEAD: normal cephalic. Atraumatic  PUPILS: equal and reactive to light  EARS: normal hearing  NECK: supple. no JVD or HJR. no carotid bruits. no visible lymphadenopathy  NOSE: no gross abnormalities  CHEST: symmetric chest wall expansion  CARDIOVASCULAR: Normal rate. Regular rhythm. Normal S1 and S2, no S3/S4,  no murmur, rub, or gallop  LUNGS: Normal effort. Normal respiratory rate. Breath sounds are clear to auscultation bilaterally. No respiratory distress. No stridor.  no rales, rhonchi or wheeze. no decreased Breath sounds  ABDOMEN: Soft, nontender, non-distended, positive bowel sounds, no mass or bruit. no abdominal tenderness. No rebound. no ascites  EXTREMITIES: No clubbing, cyanosis or edema. normal range of motion  PULSES:  distal pulses WNL  SKIN: Warm and dry with normal turgor. no visible rash or cyanosis   NEURO: Alert & oriented x 3, grossly intact with no focal weakness  PSYCH: normal mood and affect. Grossly normal insight and judgement exhibited    FAMILY HISTORY:  FH: hypertension. No family hx of ischemic heart disease mother, father or 1st degree relatives        SOCIAL HISTORY:  no active smoking. past cocaine/ETOH/opiod/benzo abuse    REVIEW OF SYSTEMS:  Constitutional: no fever, chills or malaise. No weight loss  Head: no trauma  Eyes: no visual deficit. No double vision  Ears: no hearing deficit or ringing in the ears  Nose: no nose bleeds or smell changes or congestion  Throat: no difficult swallowing or painful swallowing  Neck: supple. No lymphadenopathy or swelling  Respiratory: no SOB, wheeze, asthma, COPD. No cough. No blood in the sputum  Cardiovascular: no CP, palpitations, irregular heart beats. No edema. No PND. No orthopnea. No skin/temperature or color changes  Gastrointestinal: no abdominal pain. No constipation. No diarrhea. No melena. No nausea. No vomiting. No bloating  Genitourinary: no frequency or urgency. No hematuria  Lymphatics: no grossly swollen lymph nodes  Musculoskeletal: no limitation of range of motion. Normal strength. No pain  Integumentary: no visible rash. No itching  Neurologic: no HA. No TIA or stroke symptoms. No seizure. No hx of epilepsy. No tingling or numbness. No weakness. No dizziness  Psychiatric: denied. Reports appropriate mood.        LABS:                        9.3    7.63  )-----------( 72       ( 20 Nov 2020 03:13 )             28.8     11-20    133<L>  |  101  |  35.0<H>  ----------------------------<  105<H>  4.5   |  19.0<L>  |  2.62<H>    Ca    8.0<L>      20 Nov 2020 03:13  Phos  4.9     11-20  Mg     2.5     11-20          PT/INR - ( 20 Nov 2020 03:13 )   PT: 18.1 sec;   INR: 1.60 ratio         PTT - ( 20 Nov 2020 03:13 )  PTT:29.9 sec  serum  Lipids:     Thyroid Stimulating Hormone, Serum: 1.95 uIU/mL (11-03 @ 08:18)      RADIOLOGY & ADDITIONAL STUDIES:    < from: Xray Chest 1 View- PORTABLE-Routine (Xray Chest 1 View- PORTABLE-Routine in AM.) (11.19.20 @ 05:33) >  FINDINGS:   11/18/2020 chest radiograph available for review.  [A right IJ catheter sheath tip assembly in SVC.]  Mediastinal drains in place.  Bilateral chest tubes in place.    The lungs are clear of gross airspace consolidations or effusions. No pneumothorax.        The heart and mediastinum are within normal limits following cardiac  surgery.    Visualized osseous structures are intact.    < end of copied text >      ASSESSMENT AND PLAN:  In summary, JONATHAN GIBSON is a 60y Male with past medical history significant for     - Echocardiogram 11/4/20 EF 50-55%, grade II diastolic dysfunction, mildly reduced RV systolic function, severe LAE, mild KOLTON, mod-severe MR and AI, mod TR, dilated aortic root @ 4.2cm  - Cardiac cath 11/10/20: severe 2 vessel CAD.  Chronically occluded proximal RCA with collaterals from the LCA.  EF 50%.  Elevated LVEDP.  - s/p reop AVR 25mm CE 2700TFX, MVR 31mm Magna Ease, CABGx2 (DGV-OM, RPDA) with Dr. Butler 11/13/20  - Junctional rhythms post operatively. No events overnight. On metoprolol 25mg po q8 due to CAD and valvular heart disease. Currently conducting on his own. LBBB. I personally dw Dr Raygoza. PPM is to be deferred tody. Plan to observe for any further conduction system disease. Decision on PPM is pending hospital course.  - LBBB noted.  - SANTOS/CRI. For Perma cath  - CAD. Now post op. continue ASA 81mg daily and lipitor 40mg daily  - DM. Glycemic control per medical  - HL. atorvastatin 40mg daily  - BP is controlled and hemodynamics are stable  - CT removal as allowed by CTS. 3 CTubes in place  - Valvular heart disease. s/p reoperative AVR and now MVR. SBE prophylaxis for procedures that entail bacteremia  - Seizure disorder. Keppra in place  - Low platelets. No evidence of active bleeding. Continue to monitor  - Pain  management as per CTS  - Telemetry monitoring personally reviewed by me. SR. LBBB  - radiologic imaging reviewed  - Laboratory data reviewed.  - I personally spoke with nursing at the bedside. No overnight issues. Perma cath today. PPM currently on hold pending clinical course  - I personally dw Dr Raygoza from EP  - I have personally reviewed all obtainable prior records and data    We will follow with you    Katie Sin MD [facility], [facility]                                                    [address], [address], [place] [address]                                                         PHONE: [phone]    FAX: [phone] [address], [address], [place] [address]                                                 PHONE: [phone]    FAX: [phone]  *******************************************************************************  cc: s/p CABG, valve surgery    HPI:  60y Male with past medical history significant for HTN, HLD, CAD, ascending aortic dissection s/p repair (2001), chronic diastolic HFpEF, moderate valvular heart disease including moderate aortic and mitral regurgitation, admitted with CP/SOB/elevated troponin. Had undergone CVVH. s/p perma cath.  Also noted to have junctional rhythm post op, now resolved      Overnight events/Subjective Assessment: no new complaints. no new complaints. sitting in chair. CT in place.    INTERPRETATION OF TELEMETRY (personally reviewed): no further junctional rhythms overnight. SR LBBB    PAST MEDICAL & SURGICAL HISTORY:  CVA (cerebral vascular accident)    CHF (congestive heart failure)    H/O colectomy    Aorta disorder        penicillin (Unknown)      MEDICATIONS  (STANDING):  aMIOdarone IVPB 150 milliGRAM(s) IV Intermittent once  ascorbic acid 500 milliGRAM(s) Oral daily  aspirin enteric coated 81 milliGRAM(s) Oral daily  atorvastatin 40 milliGRAM(s) Oral at bedtime  chlorhexidine 2% Cloths 1 Application(s) Topical daily  folic acid 1 milliGRAM(s) Oral daily  levETIRAcetam 750 milliGRAM(s) Oral two times a day  melatonin 5 milliGRAM(s) Oral at bedtime  metoprolol tartrate 25 milliGRAM(s) Oral every 8 hours  multivitamin 1 Tablet(s) Oral daily  pantoprazole    Tablet 40 milliGRAM(s) Oral two times a day  QUEtiapine 25 milliGRAM(s) Oral at bedtime  senna 2 Tablet(s) Oral at bedtime  sodium chloride 0.9%. 1000 milliLiter(s) (10 mL/Hr) IV Continuous <Continuous>    MEDICATIONS  (PRN):  benzocaine 15 mG/menthol 3.6 mG (Sugar-Free) Lozenge 1 Lozenge Oral three times a day PRN Sore Throat  lidocaine   Patch 1 Patch Transdermal every 24 hours PRN as needed  oxyCODONE    IR 5 milliGRAM(s) Oral every 4 hours PRN Moderate Pain (4 - 6)  oxyCODONE    IR 10 milliGRAM(s) Oral every 4 hours PRN Severe Pain (7 - 10)      Vital Signs Last 24 Hrs  T(C): 36.7 (20 Nov 2020 00:17), Max: 37.1 (19 Nov 2020 22:23)  T(F): 98 (20 Nov 2020 00:17), Max: 98.7 (19 Nov 2020 22:23)  HR: 106 (20 Nov 2020 06:00) (102 - 106)  BP: --  BP(mean): --  RR: 17 (20 Nov 2020 06:00) (9 - 30)  SpO2: 96% (20 Nov 2020 06:00) (91% - 100%)    I&O's Detail    19 Nov 2020 07:01  -  20 Nov 2020 07:00  --------------------------------------------------------  IN:    Oral Fluid: 920 mL    Other (mL): 225 mL  Total IN: 1145 mL    OUT:    Chest Tube (mL): 480 mL    Chest Tube (mL): 20 mL    Chest Tube (mL): 310 mL  Total OUT: 810 mL    Total NET: 335 mL        I&O's Summary    19 Nov 2020 07:01  -  20 Nov 2020 07:00  --------------------------------------------------------  IN: 1145 mL / OUT: 810 mL / NET: 335 mL            PHYSICAL EXAM:  General: Appears well developed, well nourished, no acute distress. not in acute pain. in chair. 3 chest tubes in place  HEAD: normal cephalic. Atraumatic  PUPILS: equal and reactive to light  EARS: normal hearing  NECK: supple. no JVD or HJR. no carotid bruits. no visible lymphadenopathy  NOSE: no gross abnormalities  CHEST: symmetric chest wall expansion  CARDIOVASCULAR: Normal rate. Regular rhythm. Normal S1 and S2, no S3/S4,  no murmur, rub, or gallop  LUNGS: Normal effort. Normal respiratory rate. Breath sounds are clear to auscultation bilaterally. No respiratory distress. No stridor.  no rales, rhonchi or wheeze. no decreased Breath sounds  ABDOMEN: Soft, nontender, non-distended, positive bowel sounds, no mass or bruit. no abdominal tenderness. No rebound. no ascites  EXTREMITIES: No clubbing, cyanosis or edema. normal range of motion  PULSES:  distal pulses WNL  SKIN: Warm and dry with normal turgor. no visible rash or cyanosis   NEURO: Alert & oriented x 3, grossly intact with no focal weakness  PSYCH: normal mood and affect. Grossly normal insight and judgement exhibited    FAMILY HISTORY:  FH: hypertension. No family hx of ischemic heart disease mother, father or 1st degree relatives        SOCIAL HISTORY:  no active smoking. past cocaine/ETOH/opiod/benzo abuse    REVIEW OF SYSTEMS:  Constitutional: no fever, chills or malaise. No weight loss  Head: no trauma  Eyes: no visual deficit. No double vision  Ears: no hearing deficit or ringing in the ears  Nose: no nose bleeds or smell changes or congestion  Throat: no difficult swallowing or painful swallowing  Neck: supple. No lymphadenopathy or swelling  Respiratory: no SOB, wheeze, asthma, COPD. No cough. No blood in the sputum  Cardiovascular: no CP, palpitations, irregular heart beats. No edema. No PND. No orthopnea. No skin/temperature or color changes  Gastrointestinal: no abdominal pain. No constipation. No diarrhea. No melena. No nausea. No vomiting. No bloating  Genitourinary: no frequency or urgency. No hematuria  Lymphatics: no grossly swollen lymph nodes  Musculoskeletal: no limitation of range of motion. Normal strength. No pain  Integumentary: no visible rash. No itching  Neurologic: no HA. No TIA or stroke symptoms. No seizure. No hx of epilepsy. No tingling or numbness. No weakness. No dizziness  Psychiatric: denied. Reports appropriate mood.        LABS:                        9.3    7.63  )-----------( 72       ( 20 Nov 2020 03:13 )             28.8     11-20    133<L>  |  101  |  35.0<H>  ----------------------------<  105<H>  4.5   |  19.0<L>  |  2.62<H>    Ca    8.0<L>      20 Nov 2020 03:13  Phos  4.9     11-20  Mg     2.5     11-20          PT/INR - ( 20 Nov 2020 03:13 )   PT: 18.1 sec;   INR: 1.60 ratio         PTT - ( 20 Nov 2020 03:13 )  PTT:29.9 sec  serum  Lipids:     Thyroid Stimulating Hormone, Serum: 1.95 uIU/mL (11-03 @ 08:18)      RADIOLOGY & ADDITIONAL STUDIES:    < from: Xray Chest 1 View- PORTABLE-Routine (Xray Chest 1 View- PORTABLE-Routine in AM.) (11.19.20 @ 05:33) >  FINDINGS:   11/18/2020 chest radiograph available for review.  [A right IJ catheter sheath tip assembly in SVC.]  Mediastinal drains in place.  Bilateral chest tubes in place.    The lungs are clear of gross airspace consolidations or effusions. No pneumothorax.        The heart and mediastinum are within normal limits following cardiac  surgery.    Visualized osseous structures are intact.    < end of copied text >      ASSESSMENT AND PLAN:  In summary, JONATHAN GIBSON is a 60y Male with past medical history significant for past medical history significant for HTN, HLD, CAD, ascending aortic dissection s/p repair (2001), chronic diastolic HFpEF, moderate valvular heart disease including moderate aortic and mitral regurgitation, admitted with CP/SOB/elevated troponin. Had undergone CVVH. s/p perma cath.  Also noted to have junctional rhythm post op, now resolved    - Echocardiogram 11/4/20 EF 50-55%, grade II diastolic dysfunction, mildly reduced RV systolic function, severe LAE, mild KOLTON, mod-severe MR and AI, mod TR, dilated aortic root @ 4.2cm  - Cardiac cath 11/10/20: severe 2 vessel CAD.  Chronically occluded proximal RCA with collaterals from the LCA.  EF 50%.  Elevated LVEDP.  - s/p reop AVR 25mm CE 2700TFX, MVR 31mm Magna Ease, CABGx2 (DGV-OM, RPDA) with Dr. Butler 11/13/20  - Junctional rhythms post operatively. No events overnight. On metoprolol 25mg po q8 due to CAD and valvular heart disease. Currently conducting on his own. LBBB. I personally dw Dr Raygoza. PPM is to be deferred tody. Plan to observe for any further conduction system disease. Decision on PPM is pending hospital course.  - LBBB noted.  - SANTOS/CRI. For Perma cath  - CAD. Now post op. continue ASA 81mg daily and lipitor 40mg daily  - DM. Glycemic control per medical  - HL. atorvastatin 40mg daily  - HTN. BP is controlled and hemodynamics are stable  - CT removal as allowed by CTS. 1 CTube in place  - Valvular heart disease. s/p reoperative AVR and now MVR. SBE prophylaxis for procedures that entail bacteremia  - Seizure disorder. Keppra in place  - Low platelets. No evidence of active bleeding. Continue to monitor  - Pain  management as per CTS  - Telemetry monitoring personally reviewed by me. SR. LBBB  - radiologic imaging reviewed  - Laboratory data reviewed.  - I personally spoke with nursing at the bedside. No overnight issues. Perma cath. PPM currently on hold pending clinical course. No further junctional rhythm  - I have personally reviewed all obtainable prior records and data    We will follow with you    Katie Sin MD [facility], [facility]                                                    [address], [address], [place] [address]                                                         PHONE: [phone]    FAX: [phone] [address], [address], [place] [address]                                                 PHONE: [phone]    FAX: [phone]  *******************************************************************************  cc: s/p CABG, valve surgery    HPI:  60y Male with past medical history significant for HTN, HLD, CAD, ascending aortic dissection s/p repair (2001), chronic diastolic HFpEF, moderate valvular heart disease including moderate aortic and mitral regurgitation, admitted with CP/SOB/elevated troponin. Had undergone CVVH. for perma cath today.  Also noted to have junctional rhythm post op,      Overnight events/Subjective Assessment: no new complaints. no new complaints. sitting in chair. CT in place.    INTERPRETATION OF TELEMETRY (personally reviewed): no further junctional rhythms overnight. SR LBBB    PAST MEDICAL & SURGICAL HISTORY:  CVA (cerebral vascular accident)    CHF (congestive heart failure)    H/O colectomy    Aorta disorder        penicillin (Unknown)      MEDICATIONS  (STANDING):  aMIOdarone IVPB 150 milliGRAM(s) IV Intermittent once  ascorbic acid 500 milliGRAM(s) Oral daily  aspirin enteric coated 81 milliGRAM(s) Oral daily  atorvastatin 40 milliGRAM(s) Oral at bedtime  chlorhexidine 2% Cloths 1 Application(s) Topical daily  folic acid 1 milliGRAM(s) Oral daily  levETIRAcetam 750 milliGRAM(s) Oral two times a day  melatonin 5 milliGRAM(s) Oral at bedtime  metoprolol tartrate 25 milliGRAM(s) Oral every 8 hours  multivitamin 1 Tablet(s) Oral daily  pantoprazole    Tablet 40 milliGRAM(s) Oral two times a day  QUEtiapine 25 milliGRAM(s) Oral at bedtime  senna 2 Tablet(s) Oral at bedtime  sodium chloride 0.9%. 1000 milliLiter(s) (10 mL/Hr) IV Continuous <Continuous>    MEDICATIONS  (PRN):  benzocaine 15 mG/menthol 3.6 mG (Sugar-Free) Lozenge 1 Lozenge Oral three times a day PRN Sore Throat  lidocaine   Patch 1 Patch Transdermal every 24 hours PRN as needed  oxyCODONE    IR 5 milliGRAM(s) Oral every 4 hours PRN Moderate Pain (4 - 6)  oxyCODONE    IR 10 milliGRAM(s) Oral every 4 hours PRN Severe Pain (7 - 10)      Vital Signs Last 24 Hrs  T(C): 36.7 (20 Nov 2020 00:17), Max: 37.1 (19 Nov 2020 22:23)  T(F): 98 (20 Nov 2020 00:17), Max: 98.7 (19 Nov 2020 22:23)  HR: 106 (20 Nov 2020 06:00) (102 - 106)  BP: --  BP(mean): --  RR: 17 (20 Nov 2020 06:00) (9 - 30)  SpO2: 96% (20 Nov 2020 06:00) (91% - 100%)    I&O's Detail    19 Nov 2020 07:01  -  20 Nov 2020 07:00  --------------------------------------------------------  IN:    Oral Fluid: 920 mL    Other (mL): 225 mL  Total IN: 1145 mL    OUT:    Chest Tube (mL): 480 mL    Chest Tube (mL): 20 mL    Chest Tube (mL): 310 mL  Total OUT: 810 mL    Total NET: 335 mL        I&O's Summary    19 Nov 2020 07:01  -  20 Nov 2020 07:00  --------------------------------------------------------  IN: 1145 mL / OUT: 810 mL / NET: 335 mL            PHYSICAL EXAM:  General: Appears well developed, well nourished, no acute distress. not in acute pain. in chair. 3 chest tubes in place  HEAD: normal cephalic. Atraumatic  PUPILS: equal and reactive to light  EARS: normal hearing  NECK: supple. no JVD or HJR. no carotid bruits. no visible lymphadenopathy  NOSE: no gross abnormalities  CHEST: symmetric chest wall expansion  CARDIOVASCULAR: Normal rate. Regular rhythm. Normal S1 and S2, no S3/S4,  no murmur, rub, or gallop  LUNGS: Normal effort. Normal respiratory rate. Breath sounds are clear to auscultation bilaterally. No respiratory distress. No stridor.  no rales, rhonchi or wheeze. no decreased Breath sounds  ABDOMEN: Soft, nontender, non-distended, positive bowel sounds, no mass or bruit. no abdominal tenderness. No rebound. no ascites  EXTREMITIES: No clubbing, cyanosis or edema. normal range of motion  PULSES:  distal pulses WNL  SKIN: Warm and dry with normal turgor. no visible rash or cyanosis   NEURO: Alert & oriented x 3, grossly intact with no focal weakness  PSYCH: normal mood and affect. Grossly normal insight and judgement exhibited    FAMILY HISTORY:  FH: hypertension. No family hx of ischemic heart disease mother, father or 1st degree relatives        SOCIAL HISTORY:  no active smoking. past cocaine/ETOH/opiod/benzo abuse    REVIEW OF SYSTEMS:  Constitutional: no fever, chills or malaise. No weight loss  Head: no trauma  Eyes: no visual deficit. No double vision  Ears: no hearing deficit or ringing in the ears  Nose: no nose bleeds or smell changes or congestion  Throat: no difficult swallowing or painful swallowing  Neck: supple. No lymphadenopathy or swelling  Respiratory: no SOB, wheeze, asthma, COPD. No cough. No blood in the sputum  Cardiovascular: no CP, palpitations, irregular heart beats. No edema. No PND. No orthopnea. No skin/temperature or color changes  Gastrointestinal: no abdominal pain. No constipation. No diarrhea. No melena. No nausea. No vomiting. No bloating  Genitourinary: no frequency or urgency. No hematuria  Lymphatics: no grossly swollen lymph nodes  Musculoskeletal: no limitation of range of motion. Normal strength. No pain  Integumentary: no visible rash. No itching  Neurologic: no HA. No TIA or stroke symptoms. No seizure. No hx of epilepsy. No tingling or numbness. No weakness. No dizziness  Psychiatric: denied. Reports appropriate mood.        LABS:                        9.3    7.63  )-----------( 72       ( 20 Nov 2020 03:13 )             28.8     11-20    133<L>  |  101  |  35.0<H>  ----------------------------<  105<H>  4.5   |  19.0<L>  |  2.62<H>    Ca    8.0<L>      20 Nov 2020 03:13  Phos  4.9     11-20  Mg     2.5     11-20          PT/INR - ( 20 Nov 2020 03:13 )   PT: 18.1 sec;   INR: 1.60 ratio         PTT - ( 20 Nov 2020 03:13 )  PTT:29.9 sec  serum  Lipids:     Thyroid Stimulating Hormone, Serum: 1.95 uIU/mL (11-03 @ 08:18)      RADIOLOGY & ADDITIONAL STUDIES:    < from: Xray Chest 1 View- PORTABLE-Routine (Xray Chest 1 View- PORTABLE-Routine in AM.) (11.19.20 @ 05:33) >  FINDINGS:   11/18/2020 chest radiograph available for review.  [A right IJ catheter sheath tip assembly in SVC.]  Mediastinal drains in place.  Bilateral chest tubes in place.    The lungs are clear of gross airspace consolidations or effusions. No pneumothorax.        The heart and mediastinum are within normal limits following cardiac  surgery.    Visualized osseous structures are intact.    < end of copied text >      ASSESSMENT AND PLAN:  In summary, JONATHAN GIBSON is a 60y Male with past medical history significant for past medical history significant for HTN, HLD, CAD, ascending aortic dissection s/p repair (2001), chronic diastolic HFpEF, moderate valvular heart disease including moderate aortic and mitral regurgitation, admitted with CP/SOB/elevated troponin. Had undergone CVVH. for perma cath.  Also noted to have junctional rhythm post op,    - Echocardiogram 11/4/20 EF 50-55%, grade II diastolic dysfunction, mildly reduced RV systolic function, severe LAE, mild KOLTON, mod-severe MR and AI, mod TR, dilated aortic root @ 4.2cm  - Cardiac cath 11/10/20: severe 2 vessel CAD.  Chronically occluded proximal RCA with collaterals from the LCA.  EF 50%.  Elevated LVEDP.  - s/p reop AVR 25mm CE 2700TFX, MVR 31mm Magna Ease, CABGx2 (DGV-OM, RPDA) with Dr. Butler 11/13/20  - Junctional rhythms post operatively. No events overnight. On metoprolol 25mg po q8 due to CAD and valvular heart disease. Currently conducting on his own. LBBB. I personally dw Dr Raygoza. PPM is to be deferred tody. Plan to observe for any further conduction system disease. Decision on PPM is pending hospital course.  - LBBB noted.  - SANTOS/CRI. For Perma cath  - CAD. Now post op. continue ASA 81mg daily and lipitor 40mg daily  - DM. Glycemic control per medical  - HL. atorvastatin 40mg daily  - HTN. BP is controlled and hemodynamics are stable  - CT removal as allowed by CTS. 3 CTube in place  - Valvular heart disease. s/p reoperative AVR and now MVR. SBE prophylaxis for procedures that entail bacteremia  - Seizure disorder. Keppra in place  - Low platelets. No evidence of active bleeding. Continue to monitor  - Pain  management as per CTS  - Telemetry monitoring personally reviewed by me. SR. LBBB  - radiologic imaging reviewed  - Laboratory data reviewed.  - I personally spoke with nursing at the bedside. No overnight issues. Perma cath. PPM currently on hold pending clinical course.   - I personally dw Dr Raygoza of EP  - I have personally reviewed all obtainable prior records and data    We will follow with you    Katie Sin MD

## 2020-11-20 NOTE — PROGRESS NOTE ADULT - SUBJECTIVE AND OBJECTIVE BOX
[facility] DIVISION OF KIDNEY DISEASES AND HYPERTENSION -- FOLLOW UP NOTE  --------------------------------------------------------------------------------  Chief Complaint: ATN HD    24 hour events/subjective:  Seen/examined  SCr going up; last HD on 11/19      PAST HISTORY  --------------------------------------------------------------------------------  No significant changes to PMH, PSH, FHx, SHx, unless otherwise noted    ALLERGIES & MEDICATIONS  --------------------------------------------------------------------------------  Allergies    penicillin (Unknown)    Intolerances      Standing Inpatient Medications  ascorbic acid 500 milliGRAM(s) Oral daily  aspirin enteric coated 81 milliGRAM(s) Oral daily  atorvastatin 40 milliGRAM(s) Oral at bedtime  chlorhexidine 2% Cloths 1 Application(s) Topical daily  clindamycin IVPB 600 milliGRAM(s) IV Intermittent once  folic acid 1 milliGRAM(s) Oral daily  levETIRAcetam 750 milliGRAM(s) Oral two times a day  melatonin 5 milliGRAM(s) Oral at bedtime  metoprolol tartrate 25 milliGRAM(s) Oral every 8 hours  multivitamin 1 Tablet(s) Oral daily  pantoprazole    Tablet 40 milliGRAM(s) Oral two times a day  QUEtiapine 25 milliGRAM(s) Oral at bedtime  senna 2 Tablet(s) Oral at bedtime  sodium chloride 0.9%. 1000 milliLiter(s) IV Continuous <Continuous>    PRN Inpatient Medications  benzocaine 15 mG/menthol 3.6 mG (Sugar-Free) Lozenge 1 Lozenge Oral three times a day PRN  lidocaine   Patch 1 Patch Transdermal every 24 hours PRN  oxyCODONE    IR 5 milliGRAM(s) Oral every 4 hours PRN  oxyCODONE    IR 10 milliGRAM(s) Oral every 4 hours PRN      REVIEW OF SYSTEMS  --------------------------------------------------------------------------------  Gen: No weight changes, fatigue, fevers/chills, weakness  Skin: No rashes  Head/Eyes/Ears/Mouth: No headache; Normal hearing; Normal vision w/o blurriness; No sinus pain/discomfort, sore throat  Respiratory: No dyspnea, cough, wheezing, hemoptysis  CV: No chest pain, PND, orthopnea  GI: No abdominal pain, diarrhea, constipation, nausea, vomiting, melena, hematochezia  : No increased frequency, dysuria, hematuria, nocturia  MSK: No joint pain/swelling; no back pain; no edema  Neuro: No dizziness/lightheadedness, weakness, seizures, numbness, tingling  Heme: No easy bruising or bleeding  Endo: No heat/cold intolerance  Psych: No significant nervousness, anxiety, stress, depression    All other systems were reviewed and are negative, except as noted.    VITALS/PHYSICAL EXAM  --------------------------------------------------------------------------------  T(C): 36.3 (11-20-20 @ 12:00), Max: 37.1 (11-19-20 @ 22:23)  HR: 101 (11-20-20 @ 12:00) (101 - 106)  BP: 123/67 (11-20-20 @ 12:00) (123/67 - 170/79)  RR: 20 (11-20-20 @ 12:00) (9 - 27)  SpO2: 98% (11-20-20 @ 12:00) (91% - 100%)  Wt(kg): --        11-19-20 @ 07:01  -  11-20-20 @ 07:00  --------------------------------------------------------  IN: 1145 mL / OUT: 810 mL / NET: 335 mL    11-20-20 @ 07:01  -  11-20-20 @ 12:19  --------------------------------------------------------  IN: 0 mL / OUT: 360 mL / NET: -360 mL      Physical Exam:  PE ;  NAD, Pale,   lungs - CTA, Drains in Place,   CV gr 1 murmur,  No gallop or rub  Abd : soft, NT BS +, No masses  Ext- ++ edema  Neuro : Grossly intact, moving extremities     LABS/STUDIES  --------------------------------------------------------------------------------              9.3    7.63  >-----------<  72       [11-20-20 @ 03:13]              28.8     133  |  101  |  35.0  ----------------------------<  105      [11-20-20 @ 03:13]  4.5   |  19.0  |  2.62        Ca     8.0     [11-20-20 @ 03:13]      Mg     2.5     [11-20-20 @ 03:13]      Phos  4.9     [11-20-20 @ 03:13]      PT/INR: PT 18.1 , INR 1.60       [11-20-20 @ 03:13]  PTT: 29.9       [11-20-20 @ 03:13]      Creatinine Trend:  SCr 2.62 [11-20 @ 03:13]  SCr 1.28 [11-19 @ 05:11]  SCr 1.26 [11-18 @ 20:45]  SCr 1.40 [11-18 @ 03:08]  SCr 1.28 [11-17 @ 09:30]        Iron 29, TIBC 332, %sat 9      [11-03-20 @ 08:18]  Ferritin 230      [11-03-20 @ 08:18]  TSH 1.95      [11-03-20 @ 08:18]  Lipid: chol --, , HDL --, LDL --      [11-15-20 @ 02:40]    HBsAb <3.0      [11-07-20 @ 05:41]  HBsAg Nonreact      [11-07-20 @ 05:41]  HBcAb Nonreact      [11-07-20 @ 05:41]  HCV 0.07, Nonreact      [11-07-20 @ 05:41]

## 2020-11-21 LAB
ANION GAP SERPL CALC-SCNC: 16 MMOL/L — SIGNIFICANT CHANGE UP (ref 5–17)
BUN SERPL-MCNC: 55 MG/DL — HIGH (ref 8–20)
CALCIUM SERPL-MCNC: 8.2 MG/DL — LOW (ref 8.6–10.2)
CHLORIDE SERPL-SCNC: 100 MMOL/L — SIGNIFICANT CHANGE UP (ref 98–107)
CO2 SERPL-SCNC: 18 MMOL/L — LOW (ref 22–29)
CREAT SERPL-MCNC: 3.87 MG/DL — HIGH (ref 0.5–1.3)
GLUCOSE SERPL-MCNC: 112 MG/DL — HIGH (ref 70–99)
HCT VFR BLD CALC: 28.5 % — LOW (ref 39–50)
HCT VFR BLD CALC: 29.6 % — LOW (ref 39–50)
HGB BLD-MCNC: 9.1 G/DL — LOW (ref 13–17)
HGB BLD-MCNC: 9.5 G/DL — LOW (ref 13–17)
INR BLD: 1.4 RATIO — HIGH (ref 0.88–1.16)
MAGNESIUM SERPL-MCNC: 2.6 MG/DL — SIGNIFICANT CHANGE UP (ref 1.6–2.6)
MCHC RBC-ENTMCNC: 30.1 PG — SIGNIFICANT CHANGE UP (ref 27–34)
MCHC RBC-ENTMCNC: 30.3 PG — SIGNIFICANT CHANGE UP (ref 27–34)
MCHC RBC-ENTMCNC: 31.9 GM/DL — LOW (ref 32–36)
MCHC RBC-ENTMCNC: 32.1 GM/DL — SIGNIFICANT CHANGE UP (ref 32–36)
MCV RBC AUTO: 94.3 FL — SIGNIFICANT CHANGE UP (ref 80–100)
MCV RBC AUTO: 94.4 FL — SIGNIFICANT CHANGE UP (ref 80–100)
PHOSPHATE SERPL-MCNC: 5.6 MG/DL — HIGH (ref 2.4–4.7)
PLATELET # BLD AUTO: 45 K/UL — LOW (ref 150–400)
PLATELET # BLD AUTO: 83 K/UL — LOW (ref 150–400)
POTASSIUM SERPL-MCNC: 4.3 MMOL/L — SIGNIFICANT CHANGE UP (ref 3.5–5.3)
POTASSIUM SERPL-SCNC: 4.3 MMOL/L — SIGNIFICANT CHANGE UP (ref 3.5–5.3)
PROTHROM AB SERPL-ACNC: 16 SEC — HIGH (ref 10.6–13.6)
RBC # BLD: 3.02 M/UL — LOW (ref 4.2–5.8)
RBC # BLD: 3.14 M/UL — LOW (ref 4.2–5.8)
RBC # FLD: 14.8 % — HIGH (ref 10.3–14.5)
RBC # FLD: 14.8 % — HIGH (ref 10.3–14.5)
SODIUM SERPL-SCNC: 134 MMOL/L — LOW (ref 135–145)
WBC # BLD: 4.44 K/UL — SIGNIFICANT CHANGE UP (ref 3.8–10.5)
WBC # BLD: 5.88 K/UL — SIGNIFICANT CHANGE UP (ref 3.8–10.5)
WBC # FLD AUTO: 4.44 K/UL — SIGNIFICANT CHANGE UP (ref 3.8–10.5)
WBC # FLD AUTO: 5.88 K/UL — SIGNIFICANT CHANGE UP (ref 3.8–10.5)

## 2020-11-21 PROCEDURE — 71045 X-RAY EXAM CHEST 1 VIEW: CPT | Mod: 26

## 2020-11-21 PROCEDURE — 90937 HEMODIALYSIS REPEATED EVAL: CPT

## 2020-11-21 PROCEDURE — 93010 ELECTROCARDIOGRAM REPORT: CPT

## 2020-11-21 RX ORDER — METOPROLOL TARTRATE 50 MG
50 TABLET ORAL
Refills: 0 | Status: DISCONTINUED | OUTPATIENT
Start: 2020-11-21 | End: 2020-11-22

## 2020-11-21 RX ORDER — WARFARIN SODIUM 2.5 MG/1
5 TABLET ORAL ONCE
Refills: 0 | Status: COMPLETED | OUTPATIENT
Start: 2020-11-21 | End: 2020-11-21

## 2020-11-21 RX ORDER — AMIODARONE HYDROCHLORIDE 400 MG/1
150 TABLET ORAL ONCE
Refills: 0 | Status: COMPLETED | OUTPATIENT
Start: 2020-11-21 | End: 2020-11-21

## 2020-11-21 RX ORDER — ONDANSETRON 8 MG/1
4 TABLET, FILM COATED ORAL ONCE
Refills: 0 | Status: COMPLETED | OUTPATIENT
Start: 2020-11-21 | End: 2020-11-21

## 2020-11-21 RX ADMIN — OXYCODONE HYDROCHLORIDE 10 MILLIGRAM(S): 5 TABLET ORAL at 14:31

## 2020-11-21 RX ADMIN — Medication 500 MILLIGRAM(S): at 12:52

## 2020-11-21 RX ADMIN — Medication 50 MILLIGRAM(S): at 06:33

## 2020-11-21 RX ADMIN — OXYCODONE HYDROCHLORIDE 10 MILLIGRAM(S): 5 TABLET ORAL at 22:15

## 2020-11-21 RX ADMIN — WARFARIN SODIUM 5 MILLIGRAM(S): 2.5 TABLET ORAL at 21:36

## 2020-11-21 RX ADMIN — CHLORHEXIDINE GLUCONATE 1 APPLICATION(S): 213 SOLUTION TOPICAL at 12:53

## 2020-11-21 RX ADMIN — LEVETIRACETAM 750 MILLIGRAM(S): 250 TABLET, FILM COATED ORAL at 06:32

## 2020-11-21 RX ADMIN — AMIODARONE HYDROCHLORIDE 400 MILLIGRAM(S): 400 TABLET ORAL at 21:33

## 2020-11-21 RX ADMIN — Medication 1 MILLIGRAM(S): at 12:52

## 2020-11-21 RX ADMIN — PANTOPRAZOLE SODIUM 40 MILLIGRAM(S): 20 TABLET, DELAYED RELEASE ORAL at 17:07

## 2020-11-21 RX ADMIN — QUETIAPINE FUMARATE 25 MILLIGRAM(S): 200 TABLET, FILM COATED ORAL at 21:33

## 2020-11-21 RX ADMIN — Medication 50 MILLIGRAM(S): at 17:07

## 2020-11-21 RX ADMIN — AMIODARONE HYDROCHLORIDE 400 MILLIGRAM(S): 400 TABLET ORAL at 06:33

## 2020-11-21 RX ADMIN — OXYCODONE HYDROCHLORIDE 10 MILLIGRAM(S): 5 TABLET ORAL at 16:00

## 2020-11-21 RX ADMIN — PANTOPRAZOLE SODIUM 40 MILLIGRAM(S): 20 TABLET, DELAYED RELEASE ORAL at 06:32

## 2020-11-21 RX ADMIN — OXYCODONE HYDROCHLORIDE 10 MILLIGRAM(S): 5 TABLET ORAL at 21:36

## 2020-11-21 RX ADMIN — SENNA PLUS 2 TABLET(S): 8.6 TABLET ORAL at 21:32

## 2020-11-21 RX ADMIN — GABAPENTIN 100 MILLIGRAM(S): 400 CAPSULE ORAL at 21:32

## 2020-11-21 RX ADMIN — ATORVASTATIN CALCIUM 40 MILLIGRAM(S): 80 TABLET, FILM COATED ORAL at 21:33

## 2020-11-21 RX ADMIN — LEVETIRACETAM 750 MILLIGRAM(S): 250 TABLET, FILM COATED ORAL at 17:07

## 2020-11-21 RX ADMIN — Medication 5 MILLIGRAM(S): at 21:32

## 2020-11-21 RX ADMIN — AMIODARONE HYDROCHLORIDE 400 MILLIGRAM(S): 400 TABLET ORAL at 13:12

## 2020-11-21 RX ADMIN — Medication 1 TABLET(S): at 12:52

## 2020-11-21 RX ADMIN — AMIODARONE HYDROCHLORIDE 618 MILLIGRAM(S): 400 TABLET ORAL at 10:36

## 2020-11-21 RX ADMIN — Medication 81 MILLIGRAM(S): at 12:52

## 2020-11-21 RX ADMIN — ONDANSETRON 4 MILLIGRAM(S): 8 TABLET, FILM COATED ORAL at 00:36

## 2020-11-21 NOTE — PROGRESS NOTE ADULT - PROBLEM SELECTOR PLAN 7
History of seizures.  Continue Keppra.    Discussed with Dr. Butler. History of seizures.  Continue Keppra.

## 2020-11-21 NOTE — PROGRESS NOTE ADULT - ASSESSMENT
60y man with multiple comorbidities, admitted to the CTICU, s/p resternotomy with CABGx2. Patient with SANTOS on CKD requiring HD. He is now s/p RIJ Tunneled dialysis catheter placement. Recovering appropriately. Patient scheduled for HD today.    SANTOS on CKD requiring HD  -f/u HD via RIJ  -No plan for AVF creation

## 2020-11-21 NOTE — PROGRESS NOTE ADULT - SUBJECTIVE AND OBJECTIVE BOX
[facility], [facility]                                                    [address] , [address], [place] [address]                                                         PHONE: [phone]    FAX: [phone] [address], [address], [place] [address]                                                 PHONE: [phone]    FAX: [phone]  *******************************************************************************    Overnight events/Subjective Assessment:  No new cardiovascular issues overnight.  No chest pain, SOB or palpitations.    penicillin (Unknown)    MEDICATIONS  (STANDING):  aMIOdarone    Tablet   Oral   aMIOdarone    Tablet 400 milliGRAM(s) Oral every 8 hours  ascorbic acid 500 milliGRAM(s) Oral daily  aspirin enteric coated 81 milliGRAM(s) Oral daily  atorvastatin 40 milliGRAM(s) Oral at bedtime  chlorhexidine 2% Cloths 1 Application(s) Topical daily  folic acid 1 milliGRAM(s) Oral daily  gabapentin 100 milliGRAM(s) Oral at bedtime  levETIRAcetam 750 milliGRAM(s) Oral two times a day  melatonin 5 milliGRAM(s) Oral at bedtime  metoprolol tartrate 50 milliGRAM(s) Oral two times a day  multivitamin 1 Tablet(s) Oral daily  pantoprazole    Tablet 40 milliGRAM(s) Oral two times a day  QUEtiapine 25 milliGRAM(s) Oral at bedtime  senna 2 Tablet(s) Oral at bedtime  sodium chloride 0.9%. 1000 milliLiter(s) (10 mL/Hr) IV Continuous <Continuous>    MEDICATIONS  (PRN):  benzocaine 15 mG/menthol 3.6 mG (Sugar-Free) Lozenge 1 Lozenge Oral three times a day PRN Sore Throat  lidocaine   Patch 1 Patch Transdermal every 24 hours PRN as needed  oxyCODONE    IR 5 milliGRAM(s) Oral every 4 hours PRN Moderate Pain (4 - 6)  oxyCODONE    IR 10 milliGRAM(s) Oral every 4 hours PRN Severe Pain (7 - 10)      Vital Signs Last 24 Hrs  T(C): 36.8 (21 Nov 2020 07:30), Max: 36.8 (20 Nov 2020 11:10)  T(F): 98.3 (21 Nov 2020 07:30), Max: 98.3 (21 Nov 2020 07:30)  HR: 104 (21 Nov 2020 08:00) (100 - 105)  BP: 103/61 (21 Nov 2020 08:00) (92/69 - 170/79)  BP(mean): 76 (21 Nov 2020 08:00) (73 - 111)  RR: 11 (21 Nov 2020 08:00) (9 - 45)  SpO2: 95% (21 Nov 2020 08:00) (92% - 99%)    I&O's Detail    20 Nov 2020 07:01  -  21 Nov 2020 07:00  --------------------------------------------------------  IN:    IV PiggyBack: 100 mL    Oral Fluid: 240 mL  Total IN: 340 mL    OUT:    Chest Tube (mL): 30 mL    Chest Tube (mL): 230 mL    Chest Tube (mL): 350 mL  Total OUT: 610 mL    Total NET: -270 mL      21 Nov 2020 07:01  -  21 Nov 2020 09:27  --------------------------------------------------------  IN:  Total IN: 0 mL    OUT:    Chest Tube (mL): 0 mL    Chest Tube (mL): 0 mL    Chest Tube (mL): 0 mL  Total OUT: 0 mL    Total NET: 0 mL        I&O's Summary    20 Nov 2020 07:01  -  21 Nov 2020 07:00  --------------------------------------------------------  IN: 340 mL / OUT: 610 mL / NET: -270 mL    21 Nov 2020 07:01  -  21 Nov 2020 09:27  --------------------------------------------------------  IN: 0 mL / OUT: 0 mL / NET: 0 mL            PHYSICAL EXAM:  General: Appears well developed, well nourished, no acute distress  HEENT: Head: normocephalic, atraumatic  Eyes: Pupils equal and reactive  Neck: Supple, no carotid bruit, no JVD, no HJR  CARDIOVASCULAR: Tachycardic, regular, Normal S1 and S2, no murmur, rub, or gallop  LUNGS: Decreased breath sounds bilaterally, no rales, rhonchi or wheeze  ABDOMEN: Soft, nontender, non-distended, positive bowel sounds, no mass or bruit  EXTREMITIES: No edema, distal pulses WNL  SKIN: Warm and dry with normal turgor  NEURO: Alert & oriented x 3, grossly intact  PSYCH: normal mood and affect        LABS:                        9.5    5.88  )-----------( 45       ( 21 Nov 2020 04:08 )             29.6     11-21    134<L>  |  100  |  55.0<H>  ----------------------------<  112<H>  4.3   |  18.0<L>  |  3.87<H>    Ca    8.2<L>      21 Nov 2020 04:08  Phos  5.6     11-21  Mg     2.6     11-21          PT/INR - ( 21 Nov 2020 04:08 )   PT: 16.0 sec;   INR: 1.40 ratio         PTT - ( 20 Nov 2020 03:13 )  PTT:29.9 sec  serum  Lipids:     Thyroid Stimulating Hormone, Serum: 1.95 uIU/mL (11-03 @ 08:18)      RADIOLOGY & ADDITIONAL STUDIES:    TELEMETRY PERSONALLY REVIEWED: sinus tachycardia vs SVT 100s, LBBB, PVCs      ASSESSMENT AND PLAN:  In summary, JONATHAN GIBSON is a 60M s/p re-op CABGx2 (SVG->OM/RPDA), bioAVR & bioMVR 11/13/20, post-op junctional rhythm resolved, LBBB, ARF/CRI now on HD (s/p permacath 11/20/20), anemia s/p PRBCs, h/o CAD s/p stent LAD, normal LV fxn, type A dissection repair 2010, known chronic type B dissection, chronic diastolic CHF, CVA w/ L-sided sensory loss, DM, HTN, HL, psoriasis on Humira, likely COVID in 9/20 (+ Ab), prior cocaine/opioid/benzo/EtoH abuse  In summary, JONATHAN GIBSON is a 60y Male with past medical history significant for   - Patient improving clinically post-operatively, s/p reop AVR 25mm CE 2700TFX, MVR 31mm Magna Ease, CABGx2 (DGV-OM, RPDA) with Dr. Butler 11/13/20.  - Cardiac cath 11/10/20: severe 2 vessel CAD.  Chronically occluded proximal RCA with collaterals from the LCA.  EF 50%.  Elevated LVEDP.  - Echocardiogram 11/4/20 EF 50-55%, grade II diastolic dysfunction, mildly reduced RV systolic function, severe LAE, mild KOLTON, mod-severe MR and AI, mod TR, dilated aortic root @ 4.2 cm  - Junctional rhythm post-operatively resolved, no events overnight, LBBB present.  No indication for a PPM at this time.  Patient was evaluated by Dr. Raygoza (EP).  Amiodarone load in place with 400 tid x12 doses then 200 daily along with Lopressor 50 bid.  Rhythm/hemodynamics currently stable.  Monitor vitals closely and titrate PRN.  - ASA 81 daily in place  - Lipitor 40 daily in place  - ARF/CRI now on HD, s/p permacath.  Nephrology follow-up.  - DM.  Glycemic control per primary team.  - Valvular heart disease. s/p reoperative AVR and now MVR. SBE prophylaxis for procedures that entail bacteremia.  - Seizure disorder. Keppra in place.  - Low platelets. No evidence of active bleeding. Continue to monitor  - Pain management as per CTS    Ramon Raymundo MD [facility], [facility]                                                    [address] , [address], [place] [address]                                                         PHONE: [phone]    FAX: [phone] [address], [address], [place] [address]                                                 PHONE: [phone]    FAX: [phone]  *******************************************************************************    Overnight events/Subjective Assessment:  No new cardiovascular issues overnight.  No chest pain, SOB or palpitations.    penicillin (Unknown)    MEDICATIONS  (STANDING):  aMIOdarone    Tablet   Oral   aMIOdarone    Tablet 400 milliGRAM(s) Oral every 8 hours  ascorbic acid 500 milliGRAM(s) Oral daily  aspirin enteric coated 81 milliGRAM(s) Oral daily  atorvastatin 40 milliGRAM(s) Oral at bedtime  chlorhexidine 2% Cloths 1 Application(s) Topical daily  folic acid 1 milliGRAM(s) Oral daily  gabapentin 100 milliGRAM(s) Oral at bedtime  levETIRAcetam 750 milliGRAM(s) Oral two times a day  melatonin 5 milliGRAM(s) Oral at bedtime  metoprolol tartrate 50 milliGRAM(s) Oral two times a day  multivitamin 1 Tablet(s) Oral daily  pantoprazole    Tablet 40 milliGRAM(s) Oral two times a day  QUEtiapine 25 milliGRAM(s) Oral at bedtime  senna 2 Tablet(s) Oral at bedtime  sodium chloride 0.9%. 1000 milliLiter(s) (10 mL/Hr) IV Continuous <Continuous>    MEDICATIONS  (PRN):  benzocaine 15 mG/menthol 3.6 mG (Sugar-Free) Lozenge 1 Lozenge Oral three times a day PRN Sore Throat  lidocaine   Patch 1 Patch Transdermal every 24 hours PRN as needed  oxyCODONE    IR 5 milliGRAM(s) Oral every 4 hours PRN Moderate Pain (4 - 6)  oxyCODONE    IR 10 milliGRAM(s) Oral every 4 hours PRN Severe Pain (7 - 10)      Vital Signs Last 24 Hrs  T(C): 36.8 (21 Nov 2020 07:30), Max: 36.8 (20 Nov 2020 11:10)  T(F): 98.3 (21 Nov 2020 07:30), Max: 98.3 (21 Nov 2020 07:30)  HR: 104 (21 Nov 2020 08:00) (100 - 105)  BP: 103/61 (21 Nov 2020 08:00) (92/69 - 170/79)  BP(mean): 76 (21 Nov 2020 08:00) (73 - 111)  RR: 11 (21 Nov 2020 08:00) (9 - 45)  SpO2: 95% (21 Nov 2020 08:00) (92% - 99%)    I&O's Detail    20 Nov 2020 07:01  -  21 Nov 2020 07:00  --------------------------------------------------------  IN:    IV PiggyBack: 100 mL    Oral Fluid: 240 mL  Total IN: 340 mL    OUT:    Chest Tube (mL): 30 mL    Chest Tube (mL): 230 mL    Chest Tube (mL): 350 mL  Total OUT: 610 mL    Total NET: -270 mL      21 Nov 2020 07:01  -  21 Nov 2020 09:27  --------------------------------------------------------  IN:  Total IN: 0 mL    OUT:    Chest Tube (mL): 0 mL    Chest Tube (mL): 0 mL    Chest Tube (mL): 0 mL  Total OUT: 0 mL    Total NET: 0 mL        I&O's Summary    20 Nov 2020 07:01  -  21 Nov 2020 07:00  --------------------------------------------------------  IN: 340 mL / OUT: 610 mL / NET: -270 mL    21 Nov 2020 07:01  -  21 Nov 2020 09:27  --------------------------------------------------------  IN: 0 mL / OUT: 0 mL / NET: 0 mL            PHYSICAL EXAM:  General: Appears well developed, well nourished, no acute distress  HEENT: Head: normocephalic, atraumatic  Eyes: Pupils equal and reactive  Neck: Supple, no carotid bruit, no JVD, no HJR  CARDIOVASCULAR: Tachycardic, regular, Normal S1 and S2, no murmur, rub, or gallop  LUNGS: Decreased breath sounds bilaterally, no rales, rhonchi or wheeze  ABDOMEN: Soft, nontender, non-distended, positive bowel sounds, no mass or bruit  EXTREMITIES: No edema, distal pulses WNL  SKIN: Warm and dry with normal turgor  NEURO: Alert & oriented x 3, grossly intact  PSYCH: normal mood and affect        LABS:                        9.5    5.88  )-----------( 45       ( 21 Nov 2020 04:08 )             29.6     11-21    134<L>  |  100  |  55.0<H>  ----------------------------<  112<H>  4.3   |  18.0<L>  |  3.87<H>    Ca    8.2<L>      21 Nov 2020 04:08  Phos  5.6     11-21  Mg     2.6     11-21          PT/INR - ( 21 Nov 2020 04:08 )   PT: 16.0 sec;   INR: 1.40 ratio         PTT - ( 20 Nov 2020 03:13 )  PTT:29.9 sec  serum  Lipids:     Thyroid Stimulating Hormone, Serum: 1.95 uIU/mL (11-03 @ 08:18)      RADIOLOGY & ADDITIONAL STUDIES:    TELEMETRY PERSONALLY REVIEWED: AT/AFlutter 100s, LBBB, PVCs      ASSESSMENT AND PLAN:  In summary, JONATHAN GIBSON is a 60M s/p re-op CABGx2 (SVG->OM/RPDA), bioAVR & bioMVR 11/13/20, post-op junctional rhythm resolved, now AT/AFlutter 100s, LBBB, ARF/CRI now on HD (s/p permacath 11/20/20), anemia s/p PRBCs, h/o CAD s/p stent LAD, normal LV fxn, type A dissection repair 2010, known chronic type B dissection, chronic diastolic CHF, CVA w/ L-sided sensory loss, DM, HTN, HL, psoriasis on Humira, likely COVID in 9/20 (+ Ab), prior cocaine/opioid/benzo/EtoH abuse  In summary, JONATHAN GIBSON is a 60y Male with past medical history significant for   - Patient improving clinically post-operatively, s/p reop AVR 25mm CE 2700TFX, MVR 31mm Magna Ease, CABGx2 (DGV-OM, RPDA) with Dr. Butler 11/13/20.  - Cardiac cath 11/10/20: severe 2 vessel CAD.  Chronically occluded proximal RCA with collaterals from the LCA.  EF 50%.  Elevated LVEDP.  - Echocardiogram 11/4/20 EF 50-55%, grade II diastolic dysfunction, mildly reduced RV systolic function, severe LAE, mild KOLTON, mod-severe MR and AI, mod TR, dilated aortic root @ 4.2 cm  - Junctional rhythm post-operatively resolved, no events overnight, LBBB present.  No indication for a PPM at this time.  Patient was evaluated by Dr. Raygoza (EP).  Amiodarone load in place with 400 tid x12 doses then 200 daily along with Lopressor 50 bid.  Rhythm now AT/Aflutter with HR 100s, BP stable.  Monitor vitals closely and titrate meds PRN.  - ASA 81 daily in place  - Lipitor 40 daily in place  - ARF/CRI now on HD, s/p permacath.  Nephrology follow-up.  - Patient with AT/AFlutter with increased [id number] risk score.  If rhythm remains unchanged, would consider initiation of AC.  - DM.  Glycemic control per primary team.  - Valvular heart disease. s/p reoperative AVR and now MVR. SBE prophylaxis for procedures that entail bacteremia.  - Seizure disorder. Keppra in place.  - Low platelets. No evidence of active bleeding. Continue to monitor  - Pain management as per CTS    Ramon Raymundo MD [facility], [facility]                                                    [address] , [address], [place] [address]                                                         PHONE: [phone]    FAX: [phone] [address], [address], [place] [address]                                                 PHONE: [phone]    FAX: [phone]  *******************************************************************************    Overnight events/Subjective Assessment:  No new cardiovascular issues overnight.  No chest pain, SOB or palpitations.    penicillin (Unknown)    MEDICATIONS  (STANDING):  aMIOdarone    Tablet   Oral   aMIOdarone    Tablet 400 milliGRAM(s) Oral every 8 hours  ascorbic acid 500 milliGRAM(s) Oral daily  aspirin enteric coated 81 milliGRAM(s) Oral daily  atorvastatin 40 milliGRAM(s) Oral at bedtime  chlorhexidine 2% Cloths 1 Application(s) Topical daily  folic acid 1 milliGRAM(s) Oral daily  gabapentin 100 milliGRAM(s) Oral at bedtime  levETIRAcetam 750 milliGRAM(s) Oral two times a day  melatonin 5 milliGRAM(s) Oral at bedtime  metoprolol tartrate 50 milliGRAM(s) Oral two times a day  multivitamin 1 Tablet(s) Oral daily  pantoprazole    Tablet 40 milliGRAM(s) Oral two times a day  QUEtiapine 25 milliGRAM(s) Oral at bedtime  senna 2 Tablet(s) Oral at bedtime  sodium chloride 0.9%. 1000 milliLiter(s) (10 mL/Hr) IV Continuous <Continuous>    MEDICATIONS  (PRN):  benzocaine 15 mG/menthol 3.6 mG (Sugar-Free) Lozenge 1 Lozenge Oral three times a day PRN Sore Throat  lidocaine   Patch 1 Patch Transdermal every 24 hours PRN as needed  oxyCODONE    IR 5 milliGRAM(s) Oral every 4 hours PRN Moderate Pain (4 - 6)  oxyCODONE    IR 10 milliGRAM(s) Oral every 4 hours PRN Severe Pain (7 - 10)      Vital Signs Last 24 Hrs  T(C): 36.8 (21 Nov 2020 07:30), Max: 36.8 (20 Nov 2020 11:10)  T(F): 98.3 (21 Nov 2020 07:30), Max: 98.3 (21 Nov 2020 07:30)  HR: 104 (21 Nov 2020 08:00) (100 - 105)  BP: 103/61 (21 Nov 2020 08:00) (92/69 - 170/79)  BP(mean): 76 (21 Nov 2020 08:00) (73 - 111)  RR: 11 (21 Nov 2020 08:00) (9 - 45)  SpO2: 95% (21 Nov 2020 08:00) (92% - 99%)    I&O's Detail    20 Nov 2020 07:01  -  21 Nov 2020 07:00  --------------------------------------------------------  IN:    IV PiggyBack: 100 mL    Oral Fluid: 240 mL  Total IN: 340 mL    OUT:    Chest Tube (mL): 30 mL    Chest Tube (mL): 230 mL    Chest Tube (mL): 350 mL  Total OUT: 610 mL    Total NET: -270 mL      21 Nov 2020 07:01  -  21 Nov 2020 09:27  --------------------------------------------------------  IN:  Total IN: 0 mL    OUT:    Chest Tube (mL): 0 mL    Chest Tube (mL): 0 mL    Chest Tube (mL): 0 mL  Total OUT: 0 mL    Total NET: 0 mL        I&O's Summary    20 Nov 2020 07:01  -  21 Nov 2020 07:00  --------------------------------------------------------  IN: 340 mL / OUT: 610 mL / NET: -270 mL    21 Nov 2020 07:01  -  21 Nov 2020 09:27  --------------------------------------------------------  IN: 0 mL / OUT: 0 mL / NET: 0 mL            PHYSICAL EXAM:  General: Appears well developed, well nourished, no acute distress  HEENT: Head: normocephalic, atraumatic  Eyes: Pupils equal and reactive  Neck: Supple, no carotid bruit, no JVD, no HJR  CARDIOVASCULAR: Tachycardic, regular, Normal S1 and S2, no murmur, rub, or gallop  LUNGS: Decreased breath sounds bilaterally, no rales, rhonchi or wheeze  ABDOMEN: Soft, nontender, non-distended, positive bowel sounds, no mass or bruit  EXTREMITIES: No edema, distal pulses WNL  SKIN: Warm and dry with normal turgor  NEURO: Alert & oriented x 3, grossly intact  PSYCH: normal mood and affect        LABS:                        9.5    5.88  )-----------( 45       ( 21 Nov 2020 04:08 )             29.6     11-21    134<L>  |  100  |  55.0<H>  ----------------------------<  112<H>  4.3   |  18.0<L>  |  3.87<H>    Ca    8.2<L>      21 Nov 2020 04:08  Phos  5.6     11-21  Mg     2.6     11-21          PT/INR - ( 21 Nov 2020 04:08 )   PT: 16.0 sec;   INR: 1.40 ratio         PTT - ( 20 Nov 2020 03:13 )  PTT:29.9 sec  serum  Lipids:     Thyroid Stimulating Hormone, Serum: 1.95 uIU/mL (11-03 @ 08:18)      RADIOLOGY & ADDITIONAL STUDIES:    TELEMETRY PERSONALLY REVIEWED: AT/AFlutter 100s, LBBB, PVCs      ASSESSMENT AND PLAN:  In summary, JONATHAN GIBSON is a 60M s/p re-op CABGx2 (SVG->OM/RPDA), bioAVR & bioMVR 11/13/20, post-op junctional rhythm resolved, now AT/AFlutter 100s, LBBB, ARF/CRI now on HD (s/p permacath 11/20/20), anemia s/p PRBCs, h/o CAD s/p stent LAD, normal LV fxn, type A dissection repair 2010, known chronic type B dissection, chronic diastolic CHF, CVA w/ L-sided sensory loss, DM, HTN, HL, psoriasis on Humira, likely COVID in 9/20 (+ Ab), prior cocaine/opioid/benzo/EtoH abuse  In summary, JONATHAN GIBSON is a 60y Male with past medical history significant for   - Patient improving clinically post-operatively, s/p reop AVR 25mm CE 2700TFX, MVR 31mm Magna Ease, CABGx2 (DGV-OM, RPDA) with Dr. Butler 11/13/20.  - Cardiac cath 11/10/20: severe 2 vessel CAD.  Chronically occluded proximal RCA with collaterals from the LCA.  EF 50%.  Elevated LVEDP.  - Echocardiogram 11/4/20 EF 50-55%, grade II diastolic dysfunction, mildly reduced RV systolic function, severe LAE, mild KOLTON, mod-severe MR and AI, mod TR, dilated aortic root @ 4.2 cm  - Junctional rhythm post-operatively resolved, no events overnight, LBBB present.  No indication for a PPM at this time.  Patient was evaluated by Dr. Raygoza (EP).  Amiodarone load in place with 400 tid x12 doses then 200 daily along with Lopressor 50 bid.  Rhythm now AT/Aflutter with HR 100s, BP stable.  Monitor vitals closely and titrate meds PRN.  - ASA 81 daily in place  - Lipitor 40 daily in place  - ARF/CRI now on HD, s/p permacath.  Nephrology follow-up.  - Patient with AFlutter with increased [id number] risk score currently maintained on oral AC with Coumadin.  Dosing per primary team.  Goal INR 2-3.  - DM.  Glycemic control per primary team.  - Valvular heart disease. s/p reoperative AVR and now MVR. SBE prophylaxis for procedures that entail bacteremia.  - Seizure disorder. Keppra in place.  - Low platelets. No evidence of active bleeding. Continue to monitor  - Pain management as per CTS    Ramon Raymundo MD

## 2020-11-21 NOTE — PROGRESS NOTE ADULT - ASSESSMENT
JONATHAN GIBSON is a 60M s/p re-op CABGx2 (SVG->OM/RPDA), bioAVR & bioMVR 11/13/20, post-op junctional rhythm resolved, LBBB, ARF/CRI now on HD (s/p permacath 11/20/20), anemia s/p PRBCs, h/o CAD s/p stent LAD, normal LV fxn, type A dissection repair 2010, known chronic type B dissection, chronic diastolic CHF, CVA w/ L-sided sensory loss, DM, HTN, HL, psoriasis on Humira, likely COVID in 9/20 (+ Ab), prior cocaine/opioid/benzo/EtoH abuse  In summary, JONATHAN GIBSON is a 60y Male with past medical history significant for   - Patient improving clinically post-operatively, s/p reop AVR 25mm CE 2700TFX, MVR 31mm Magna Ease, CABGx2 (DGV-OM, RPDA) with Dr. Butler 11/13/20.  - Cardiac cath 11/10/20: severe 2 vessel CAD.  Chronically occluded proximal RCA with collaterals from the LCA.  EF 50%.  Elevated LVEDP.  - Echocardiogram 11/4/20 EF 50-55%, grade II diastolic dysfunction, mildly reduced RV systolic function, severe LAE, mild KOLTON, mod-severe MR and AI, mod TR, dilated aortic root @ 4.2 cm  - Junctional rhythm post-operatively resolved, no events overnight, LBBB present.  No indication for a PPM at this time.  Patient was evaluated by Dr. Raygoza (EP).  Amiodarone load in place with 400 tid x12 doses then 200 daily along with Lopressor 50 bid.                          Rhythm/hemodynamics currently stable.  Monitor vitals closely and titrate PRN.  - On ASA 81 daily & Lipitor 40 daily ,  - ARF/CRI now on HD, s/p TDC,   - DM.  Glycemic control per primary team.  - Valvular heart disease. s/p Reoperative AVR and now MVR. SBE prophylaxis for procedures that entail bacteremia.  - Seizure disorder. Keppra in place. Monitor levels,   - Low platelets. No evidence of active bleeding. Continue to monitor    Tolerated HD     UF 1 L.,

## 2020-11-21 NOTE — PROGRESS NOTE ADULT - PROBLEM SELECTOR PLAN 1
neurologically at baseline  hemodynamically stable off pressors and inotropic support  bradycardia resolved, now tachycardic on Lopressor 25 q 8 hrs  continue aspirin for acute graft closure prophylaxis  continue statin for chronic graft patency prophylaxis  Encourage coughing and deep breathing exercises/incentive spirometry   nocturnal BiPAP as tolerated, pt initially refused, then later agreed for a few hours of bipap  maintain CTs for now  Ambulate with PT assist as tolerated later today

## 2020-11-21 NOTE — PROGRESS NOTE ADULT - SUBJECTIVE AND OBJECTIVE BOX
[facility] DIVISION OF KIDNEY DISEASES AND HYPERTENSION -- HEMODIALYSIS NOTE  --------------------------------------------------------------------------------  Chief Complaint: ESRD/Ongoing hemodialysis requirement    24 hour events/subjective:    PAST HISTORY  --------------------------------------------------------------------------------  No significant changes to PMH, PSH, FHx, SHx, unless otherwise noted    ALLERGIES & MEDICATIONS  --------------------------------------------------------------------------------  Allergies    penicillin (Unknown)    Standing Inpatient Medications  aMIOdarone    Tablet   Oral   aMIOdarone    Tablet 400 milliGRAM(s) Oral every 8 hours  ascorbic acid 500 milliGRAM(s) Oral daily  aspirin enteric coated 81 milliGRAM(s) Oral daily  atorvastatin 40 milliGRAM(s) Oral at bedtime  chlorhexidine 2% Cloths 1 Application(s) Topical daily  folic acid 1 milliGRAM(s) Oral daily  gabapentin 100 milliGRAM(s) Oral at bedtime  levETIRAcetam 750 milliGRAM(s) Oral two times a day  melatonin 5 milliGRAM(s) Oral at bedtime  metoprolol tartrate 50 milliGRAM(s) Oral two times a day  multivitamin 1 Tablet(s) Oral daily  pantoprazole    Tablet 40 milliGRAM(s) Oral two times a day  QUEtiapine 25 milliGRAM(s) Oral at bedtime  senna 2 Tablet(s) Oral at bedtime  sodium chloride 0.9%. 1000 milliLiter(s) IV Continuous <Continuous>  warfarin 5 milliGRAM(s) Oral once    PRN Inpatient Medications  benzocaine 15 mG/menthol 3.6 mG (Sugar-Free) Lozenge 1 Lozenge Oral three times a day PRN  lidocaine   Patch 1 Patch Transdermal every 24 hours PRN  oxyCODONE    IR 5 milliGRAM(s) Oral every 4 hours PRN  oxyCODONE    IR 10 milliGRAM(s) Oral every 4 hours PRN    REVIEW OF SYSTEMS : Limited ( Patient is weak, )  --------------------------------------------------------------------------------  All other systems were reviewed and are negative, except as noted.    VITALS/PHYSICAL EXAM  --------------------------------------------------------------------------------  T(C): 36.4 (11-21-20 @ 12:35), Max: 36.8 (11-21-20 @ 00:24)  HR: 100 (11-21-20 @ 13:00) (97 - 105)  BP: 114/84 (11-21-20 @ 13:00) (92/69 - 144/86)  RR: 12 (11-21-20 @ 13:00) (9 - 45)  SpO2: 98% (11-21-20 @ 13:00) (92% - 99%)    11-20-20 @ 07:01  -  11-21-20 @ 07:00  --------------------------------------------------------  IN: 340 mL / OUT: 610 mL / NET: -270 mL    11-21-20 @ 07:01  -  11-21-20 @ 14:38  --------------------------------------------------------  IN: 100 mL / OUT: 1210 mL / NET: -1110 mL    Physical Exam:  	Gen: NAD, weak, Pale, Deconditioned,   	HEENT: PERRL, supple neck,   	Pulm: CTA B/L  	CV: RRR, S1S2; no rub  	Back: No spinal or CVA tenderness; no sacral edema  	Abd: +BS, soft, nontender/nondistended  	: No suprapubic tenderness  	UE: Warm, FROM, no clubbing, intact strength; no edema; no asterixis  	LE: Warm, FROM, no clubbing, intact strength; no edema  	Neuro: No focal deficits,   	Psych: Normal affect and mood  	Skin: Warm, without rashes  	Vascular access: TDC.    LABS/STUDIES  --------------------------------------------------------------------------------              9.1    4.44  >-----------<  83       [11-21-20 @ 09:47]              28.5     134  |  100  |  55.0  ----------------------------<  112      [11-21-20 @ 04:08]  4.3   |  18.0  |  3.87        Ca     8.2     [11-21-20 @ 04:08]      Mg     2.6     [11-21-20 @ 04:08]      Phos  5.6     [11-21-20 @ 04:08]      PT/INR: PT 16.0 , INR 1.40       [11-21-20 @ 04:08]  PTT: 29.9       [11-20-20 @ 03:13]    Iron 29, TIBC 332, %sat 9      [11-03-20 @ 08:18]  Ferritin 230      [11-03-20 @ 08:18]  TSH 1.95      [11-03-20 @ 08:18]  Lipid: chol --, , HDL --, LDL --      [11-15-20 @ 02:40]    HBsAb <3.0      [11-07-20 @ 05:41]  HBsAg Nonreact      [11-07-20 @ 05:41]  HBcAb Nonreact      [11-07-20 @ 05:41]  HCV 0.07, Nonreact      [11-07-20 @ 05:41]

## 2020-11-21 NOTE — PROGRESS NOTE ADULT - SUBJECTIVE AND OBJECTIVE BOX
Subjective:  60y      HPI:  Patient is a 60 yoM with PMH of CAD s/p stent in LAD, leaky aortic valve, uncontrolled bp, thoracic aortic dissection requiring emergent surgery, CVA with left sided sensory loss, colostomy for bowel ischemia s/p reversal, psoriasis on Humira injections presents from home with sob and le edema. Patient states the past several weeks, he has not been taking his medications because he forgot. Patient states the sob has gotten worse over the past few days and also associated with chest pressure. Patient also hasnt seen his cardiologist in several months. Patient worked up in the er and had an elevated bnp and htn urgency and eric. Patient seen at bedside and states feeling a little better.  (02 Nov 2020 14:32)          PAST MEDICAL & SURGICAL HISTORY:  CVA (cerebral vascular accident)    CHF (congestive heart failure)    H/O colectomy    Aorta disorder            MEDICATIONS  (STANDING):  aMIOdarone    Tablet   Oral   aMIOdarone    Tablet 400 milliGRAM(s) Oral every 8 hours  ascorbic acid 500 milliGRAM(s) Oral daily  aspirin enteric coated 81 milliGRAM(s) Oral daily  atorvastatin 40 milliGRAM(s) Oral at bedtime  chlorhexidine 2% Cloths 1 Application(s) Topical daily  folic acid 1 milliGRAM(s) Oral daily  gabapentin 100 milliGRAM(s) Oral at bedtime  levETIRAcetam 750 milliGRAM(s) Oral two times a day  melatonin 5 milliGRAM(s) Oral at bedtime  metoprolol tartrate 25 milliGRAM(s) Oral every 8 hours  multivitamin 1 Tablet(s) Oral daily  pantoprazole    Tablet 40 milliGRAM(s) Oral two times a day  QUEtiapine 25 milliGRAM(s) Oral at bedtime  senna 2 Tablet(s) Oral at bedtime  sodium chloride 0.9%. 1000 milliLiter(s) (10 mL/Hr) IV Continuous <Continuous>    MEDICATIONS  (PRN):  benzocaine 15 mG/menthol 3.6 mG (Sugar-Free) Lozenge 1 Lozenge Oral three times a day PRN Sore Throat  lidocaine   Patch 1 Patch Transdermal every 24 hours PRN as needed  oxyCODONE    IR 5 milliGRAM(s) Oral every 4 hours PRN Moderate Pain (4 - 6)  oxyCODONE    IR 10 milliGRAM(s) Oral every 4 hours PRN Severe Pain (7 - 10)          Allergies    penicillin (Unknown)    Intolerances          WEIGHTS:  Daily     Daily   Admit Wt: Drug Dosing Weight  Height (cm): 165.1 (02 Nov 2020 08:55)  Weight (kg): 112 (17 Nov 2020 12:00)  BMI (kg/m2): 41.1 (17 Nov 2020 12:00)  BSA (m2): 2.16 (17 Nov 2020 12:00)  I&O's Summary    19 Nov 2020 07:01  -  20 Nov 2020 07:00  --------------------------------------------------------  IN: 1145 mL / OUT: 810 mL / NET: 335 mL    20 Nov 2020 07:01  -  21 Nov 2020 03:17  --------------------------------------------------------  IN: 340 mL / OUT: 450 mL / NET: -110 mL        VITAL SIGNS:  ICU Vital Signs Last 24 Hrs  T(C): 36.8 (21 Nov 2020 00:24), Max: 36.8 (20 Nov 2020 11:10)  T(F): 98.2 (21 Nov 2020 00:24), Max: 98.2 (20 Nov 2020 11:10)  HR: 103 (21 Nov 2020 03:00) (100 - 106)  BP: 143/85 (21 Nov 2020 03:00) (108/63 - 170/79)  BP(mean): 109 (21 Nov 2020 03:00) (79 - 111)  ABP: 111/66 (20 Nov 2020 10:00) (105/67 - 123/81)  ABP(mean): 74 (20 Nov 2020 10:00) (69 - 92)  RR: 14 (21 Nov 2020 03:00) (9 - 45)  SpO2: 98% (21 Nov 2020 03:00) (93% - 99%)        All laboratory results, radiology and medications reviewed.    LABS:  11-20    133<L>  |  101  |  51.0<H>  ----------------------------<  130<H>  4.6   |  17.0<L>  |  3.65<H>    Ca    8.1<L>      20 Nov 2020 22:24  Phos  4.9     11-20  Mg     2.6     11-20                                   9.3    7.63  )-----------( 72       ( 20 Nov 2020 03:13 )             28.8          PT/INR - ( 20 Nov 2020 03:13 )   PT: 18.1 sec;   INR: 1.60 ratio         PTT - ( 20 Nov 2020 03:13 )  PTT:29.9 sec          CAPILLARY BLOOD GLUCOSE                 PHYSICAL EXAM:  General:  well nourished, no acute distress  Neurology:  alert and oriented X 4, nonfocal, no gross deficits  Respiratory:  clear to auscultation bilaterally  CV:  sinus tachycardia  Abdomen:  soft, nontender, nondistended, positive bowel sounds  Extremities:  warm, well perfused, no edema +DP pulses  Incisions:  midline sternal incision, c/d/i, sternum stable

## 2020-11-21 NOTE — PROGRESS NOTE ADULT - SUBJECTIVE AND OBJECTIVE BOX
HPI/OVERNIGHT EVENTS:  Patient s/p right tunneled dialysis catheter placement. Pain well controlled. Patient scheduled for HD today. AVSS.     MEDICATIONS  (STANDING):  aMIOdarone    Tablet   Oral   aMIOdarone    Tablet 400 milliGRAM(s) Oral every 8 hours  ascorbic acid 500 milliGRAM(s) Oral daily  aspirin enteric coated 81 milliGRAM(s) Oral daily  atorvastatin 40 milliGRAM(s) Oral at bedtime  chlorhexidine 2% Cloths 1 Application(s) Topical daily  folic acid 1 milliGRAM(s) Oral daily  gabapentin 100 milliGRAM(s) Oral at bedtime  levETIRAcetam 750 milliGRAM(s) Oral two times a day  melatonin 5 milliGRAM(s) Oral at bedtime  metoprolol tartrate 50 milliGRAM(s) Oral two times a day  multivitamin 1 Tablet(s) Oral daily  pantoprazole    Tablet 40 milliGRAM(s) Oral two times a day  QUEtiapine 25 milliGRAM(s) Oral at bedtime  senna 2 Tablet(s) Oral at bedtime  sodium chloride 0.9%. 1000 milliLiter(s) (10 mL/Hr) IV Continuous <Continuous>    MEDICATIONS  (PRN):  benzocaine 15 mG/menthol 3.6 mG (Sugar-Free) Lozenge 1 Lozenge Oral three times a day PRN Sore Throat  lidocaine   Patch 1 Patch Transdermal every 24 hours PRN as needed  oxyCODONE    IR 5 milliGRAM(s) Oral every 4 hours PRN Moderate Pain (4 - 6)  oxyCODONE    IR 10 milliGRAM(s) Oral every 4 hours PRN Severe Pain (7 - 10)      Vital Signs Last 24 Hrs  T(C): 36.8 (21 Nov 2020 07:30), Max: 36.8 (20 Nov 2020 11:10)  T(F): 98.3 (21 Nov 2020 07:30), Max: 98.3 (21 Nov 2020 07:30)  HR: 104 (21 Nov 2020 07:30) (100 - 105)  BP: 94/60 (21 Nov 2020 07:30) (92/69 - 170/79)  BP(mean): 73 (21 Nov 2020 07:30) (73 - 111)  RR: 15 (21 Nov 2020 07:30) (9 - 45)  SpO2: 95% (21 Nov 2020 07:30) (92% - 99%)    General: NAD, A&O x 3  CV: midline sternal incision dressing c/d/i  HEENT: Right PermCath in place, surrounding skin is non-tender, non-erythematous; no signs of active bleeding   Resp: breathing comfortably      I&O's Detail    20 Nov 2020 07:01  -  21 Nov 2020 07:00  --------------------------------------------------------  IN:    IV PiggyBack: 100 mL    Oral Fluid: 240 mL  Total IN: 340 mL    OUT:    Chest Tube (mL): 30 mL    Chest Tube (mL): 230 mL    Chest Tube (mL): 350 mL  Total OUT: 610 mL    Total NET: -270 mL          LABS:                        9.5    5.88  )-----------( 45       ( 21 Nov 2020 04:08 )             29.6     11-21    134<L>  |  100  |  55.0<H>  ----------------------------<  112<H>  4.3   |  18.0<L>  |  3.87<H>    Ca    8.2<L>      21 Nov 2020 04:08  Phos  5.6     11-21  Mg     2.6     11-21      PT/INR - ( 21 Nov 2020 04:08 )   PT: 16.0 sec;   INR: 1.40 ratio         PTT - ( 20 Nov 2020 03:13 )  PTT:29.9 sec

## 2020-11-22 LAB
ANION GAP SERPL CALC-SCNC: 12 MMOL/L — SIGNIFICANT CHANGE UP (ref 5–17)
BUN SERPL-MCNC: 44 MG/DL — HIGH (ref 8–20)
CALCIUM SERPL-MCNC: 8.4 MG/DL — LOW (ref 8.6–10.2)
CHLORIDE SERPL-SCNC: 97 MMOL/L — LOW (ref 98–107)
CO2 SERPL-SCNC: 24 MMOL/L — SIGNIFICANT CHANGE UP (ref 22–29)
CREAT SERPL-MCNC: 3.58 MG/DL — HIGH (ref 0.5–1.3)
GLUCOSE SERPL-MCNC: 111 MG/DL — HIGH (ref 70–99)
HCT VFR BLD CALC: 30.3 % — LOW (ref 39–50)
HEPARIN-PF4 AB RESULT: 1 U/ML — HIGH (ref 0–0.9)
HGB BLD-MCNC: 9.6 G/DL — LOW (ref 13–17)
INR BLD: 2.61 RATIO — HIGH (ref 0.88–1.16)
MAGNESIUM SERPL-MCNC: 2.2 MG/DL — SIGNIFICANT CHANGE UP (ref 1.6–2.6)
MCHC RBC-ENTMCNC: 30.1 PG — SIGNIFICANT CHANGE UP (ref 27–34)
MCHC RBC-ENTMCNC: 31.7 GM/DL — LOW (ref 32–36)
MCV RBC AUTO: 95 FL — SIGNIFICANT CHANGE UP (ref 80–100)
PF4 HEPARIN CMPLX AB SER-ACNC: POSITIVE — SIGNIFICANT CHANGE UP
PHOSPHATE SERPL-MCNC: 4.9 MG/DL — HIGH (ref 2.4–4.7)
PLATELET # BLD AUTO: 104 K/UL — LOW (ref 150–400)
POTASSIUM SERPL-MCNC: 4.3 MMOL/L — SIGNIFICANT CHANGE UP (ref 3.5–5.3)
POTASSIUM SERPL-SCNC: 4.3 MMOL/L — SIGNIFICANT CHANGE UP (ref 3.5–5.3)
PROTHROM AB SERPL-ACNC: 28.9 SEC — HIGH (ref 10.6–13.6)
RBC # BLD: 3.19 M/UL — LOW (ref 4.2–5.8)
RBC # FLD: 14.8 % — HIGH (ref 10.3–14.5)
SODIUM SERPL-SCNC: 133 MMOL/L — LOW (ref 135–145)
WBC # BLD: 9 K/UL — SIGNIFICANT CHANGE UP (ref 3.8–10.5)
WBC # FLD AUTO: 9 K/UL — SIGNIFICANT CHANGE UP (ref 3.8–10.5)

## 2020-11-22 PROCEDURE — 71045 X-RAY EXAM CHEST 1 VIEW: CPT | Mod: 26

## 2020-11-22 PROCEDURE — 90937 HEMODIALYSIS REPEATED EVAL: CPT

## 2020-11-22 RX ORDER — METOPROLOL TARTRATE 50 MG
50 TABLET ORAL EVERY 8 HOURS
Refills: 0 | Status: DISCONTINUED | OUTPATIENT
Start: 2020-11-22 | End: 2020-11-23

## 2020-11-22 RX ORDER — NOREPINEPHRINE BITARTRATE/D5W 8 MG/250ML
0.05 PLASTIC BAG, INJECTION (ML) INTRAVENOUS
Qty: 8 | Refills: 0 | Status: DISCONTINUED | OUTPATIENT
Start: 2020-11-22 | End: 2020-11-23

## 2020-11-22 RX ORDER — ALBUMIN HUMAN 25 %
50 VIAL (ML) INTRAVENOUS ONCE
Refills: 0 | Status: COMPLETED | OUTPATIENT
Start: 2020-11-22 | End: 2020-11-22

## 2020-11-22 RX ADMIN — LEVETIRACETAM 750 MILLIGRAM(S): 250 TABLET, FILM COATED ORAL at 17:33

## 2020-11-22 RX ADMIN — OXYCODONE HYDROCHLORIDE 10 MILLIGRAM(S): 5 TABLET ORAL at 22:20

## 2020-11-22 RX ADMIN — OXYCODONE HYDROCHLORIDE 10 MILLIGRAM(S): 5 TABLET ORAL at 13:57

## 2020-11-22 RX ADMIN — AMIODARONE HYDROCHLORIDE 400 MILLIGRAM(S): 400 TABLET ORAL at 21:23

## 2020-11-22 RX ADMIN — OXYCODONE HYDROCHLORIDE 10 MILLIGRAM(S): 5 TABLET ORAL at 21:20

## 2020-11-22 RX ADMIN — Medication 81 MILLIGRAM(S): at 13:56

## 2020-11-22 RX ADMIN — LEVETIRACETAM 750 MILLIGRAM(S): 250 TABLET, FILM COATED ORAL at 05:07

## 2020-11-22 RX ADMIN — QUETIAPINE FUMARATE 25 MILLIGRAM(S): 200 TABLET, FILM COATED ORAL at 21:23

## 2020-11-22 RX ADMIN — SENNA PLUS 2 TABLET(S): 8.6 TABLET ORAL at 21:23

## 2020-11-22 RX ADMIN — Medication 50 MILLIGRAM(S): at 17:33

## 2020-11-22 RX ADMIN — Medication 50 MILLIGRAM(S): at 21:23

## 2020-11-22 RX ADMIN — AMIODARONE HYDROCHLORIDE 400 MILLIGRAM(S): 400 TABLET ORAL at 13:57

## 2020-11-22 RX ADMIN — Medication 1 MILLIGRAM(S): at 13:56

## 2020-11-22 RX ADMIN — PANTOPRAZOLE SODIUM 40 MILLIGRAM(S): 20 TABLET, DELAYED RELEASE ORAL at 17:34

## 2020-11-22 RX ADMIN — LIDOCAINE 1 PATCH: 4 CREAM TOPICAL at 21:23

## 2020-11-22 RX ADMIN — ATORVASTATIN CALCIUM 40 MILLIGRAM(S): 80 TABLET, FILM COATED ORAL at 21:22

## 2020-11-22 RX ADMIN — OXYCODONE HYDROCHLORIDE 10 MILLIGRAM(S): 5 TABLET ORAL at 14:30

## 2020-11-22 RX ADMIN — AMIODARONE HYDROCHLORIDE 400 MILLIGRAM(S): 400 TABLET ORAL at 05:08

## 2020-11-22 RX ADMIN — Medication 500 MILLIGRAM(S): at 13:56

## 2020-11-22 RX ADMIN — CHLORHEXIDINE GLUCONATE 1 APPLICATION(S): 213 SOLUTION TOPICAL at 13:56

## 2020-11-22 RX ADMIN — Medication 50 MILLIGRAM(S): at 05:07

## 2020-11-22 RX ADMIN — Medication 1 TABLET(S): at 13:57

## 2020-11-22 RX ADMIN — GABAPENTIN 100 MILLIGRAM(S): 400 CAPSULE ORAL at 21:22

## 2020-11-22 RX ADMIN — PANTOPRAZOLE SODIUM 40 MILLIGRAM(S): 20 TABLET, DELAYED RELEASE ORAL at 05:07

## 2020-11-22 RX ADMIN — Medication 5 MILLIGRAM(S): at 21:23

## 2020-11-22 RX ADMIN — Medication 50 MILLILITER(S): at 11:16

## 2020-11-22 NOTE — PROGRESS NOTE ADULT - SUBJECTIVE AND OBJECTIVE BOX
[facility] DIVISION OF KIDNEY DISEASES AND HYPERTENSION -- HEMODIALYSIS NOTE  --------------------------------------------------------------------------------  Chief Complaint: ESRD/Ongoing hemodialysis requirement    24 hour events/subjective:    PAST HISTORY  --------------------------------------------------------------------------------  No significant changes to PMH, PSH, FHx, SHx, unless otherwise noted    ALLERGIES & MEDICATIONS  --------------------------------------------------------------------------------  Allergies    penicillin (Unknown)    Standing Inpatient Medications  albumin human 25% IVPB 50 milliLiter(s) IV Intermittent once  aMIOdarone    Tablet   Oral   aMIOdarone    Tablet 400 milliGRAM(s) Oral every 8 hours  ascorbic acid 500 milliGRAM(s) Oral daily  aspirin enteric coated 81 milliGRAM(s) Oral daily  atorvastatin 40 milliGRAM(s) Oral at bedtime  chlorhexidine 2% Cloths 1 Application(s) Topical daily  folic acid 1 milliGRAM(s) Oral daily  gabapentin 100 milliGRAM(s) Oral at bedtime  levETIRAcetam 750 milliGRAM(s) Oral two times a day  melatonin 5 milliGRAM(s) Oral at bedtime  metoprolol tartrate 50 milliGRAM(s) Oral two times a day  multivitamin 1 Tablet(s) Oral daily  norepinephrine Infusion 0.05 MICROgram(s)/kG/Min IV Continuous <Continuous>  pantoprazole    Tablet 40 milliGRAM(s) Oral two times a day  QUEtiapine 25 milliGRAM(s) Oral at bedtime  senna 2 Tablet(s) Oral at bedtime  sodium chloride 0.9%. 1000 milliLiter(s) IV Continuous <Continuous>    PRN Inpatient Medications  benzocaine 15 mG/menthol 3.6 mG (Sugar-Free) Lozenge 1 Lozenge Oral three times a day PRN  lidocaine   Patch 1 Patch Transdermal every 24 hours PRN  oxyCODONE    IR 5 milliGRAM(s) Oral every 4 hours PRN  oxyCODONE    IR 10 milliGRAM(s) Oral every 4 hours PRN      REVIEW OF SYSTEMS  --------------------------------------------------------------------------------  Gen: No weight changes, fatigue, fevers/chills, weakness  Skin: No rashes  Head/Eyes/Ears/Mouth: No headache; Normal hearing; Normal vision w/o blurriness; No sinus pain/discomfort, sore throat  Respiratory: No dyspnea, cough, wheezing, hemoptysis  CV: No chest pain, PND, orthopnea  GI: No abdominal pain, diarrhea, constipation, nausea, vomiting, melena, hematochezia  : No increased frequency, dysuria, hematuria, nocturia  MSK: No joint pain/swelling; no back pain; no edema  Neuro: No dizziness/lightheadedness, weakness, seizures, numbness, tingling  Heme: No easy bruising or bleeding  Endo: No heat/cold intolerance  Psych: No significant nervousness, anxiety, stress, depression    All other systems were reviewed and are negative, except as noted.    VITALS/PHYSICAL EXAM  --------------------------------------------------------------------------------  T(C): 36.3 (11-22-20 @ 07:13), Max: 36.7 (11-21-20 @ 17:20)  HR: 100 (11-22-20 @ 10:00) (97 - 103)  BP: 97/64 (11-22-20 @ 10:00) (95/67 - 131/79)  RR: 11 (11-22-20 @ 10:00) (8 - 27)  SpO2: 97% (11-22-20 @ 10:00) (92% - 100%)    11-21-20 @ 07:01  -  11-22-20 @ 07:00  --------------------------------------------------------  IN: 340 mL / OUT: 1480 mL / NET: -1140 mL      Physical Exam:  	Gen: NAD, well-appearing, Pale, More Lucid,   	HEENT: PERRL, supple neck,   	Pulm: CTA B/L  	CV: RRR, S1S2; no rub  	Back: No spinal or CVA tenderness; no sacral edema  	Abd: +BS, soft, nontender/nondistended  	: No suprapubic tenderness  	UE: Warm, FROM, no clubbing, intact strength; no edema; no asterixis  	LE: Warm, FROM, no clubbing, intact strength; no edema  	Neuro: No focal deficits, intact gait  	Psych: Normal affect and mood  	Skin: Warm, without rashes  	Vascular access: TDC,     LABS/STUDIES  --------------------------------------------------------------------------------              9.6    9.00  >-----------<  104      [11-22-20 @ 03:15]              30.3     133  |  97  |  44.0  ----------------------------<  111      [11-22-20 @ 03:15]  4.3   |  24.0  |  3.58        Ca     8.4     [11-22-20 @ 03:15]      Mg     2.2     [11-22-20 @ 03:15]      Phos  4.9     [11-22-20 @ 03:15]      PT/INR: PT 28.9 , INR 2.61       [11-22-20 @ 04:33]      Iron 29, TIBC 332, %sat 9      [11-03-20 @ 08:18]  Ferritin 230      [11-03-20 @ 08:18]  TSH 1.95      [11-03-20 @ 08:18]  Lipid: chol --, , HDL --, LDL --      [11-15-20 @ 02:40]    HBsAb <3.0      [11-07-20 @ 05:41]  HBsAg Nonreact      [11-07-20 @ 05:41]  HBcAb Nonreact      [11-07-20 @ 05:41]  HCV 0.07, Nonreact      [11-07-20 @ 05:41]

## 2020-11-22 NOTE — PROGRESS NOTE ADULT - PROBLEM SELECTOR PLAN 1
neurologically at baseline  hemodynamically stable off pressors and inotropic support  bradycardia resolved, now A Flutter on Lopressor 50 mg BID  continue aspirin for acute graft closure prophylaxis  continue statin for chronic graft patency prophylaxis  Encourage coughing and deep breathing exercises/incentive spirometry   nocturnal BiPAP as tolerated, pt refusing most of night   Ambulate with PT assist as tolerated later today

## 2020-11-22 NOTE — PROGRESS NOTE ADULT - SUBJECTIVE AND OBJECTIVE BOX
[facility], [facility], [place]                                                    [address] , [address], [place] [address]                                                         PHONE: [phone]    FAX: [phone] [address], [address], [place] [address]                                                 PHONE: [phone]    FAX: [phone]  *******************************************************************************    Overnight events/Subjective Assessment:  No new cardiovascular issues overnight.  No chest pain, SOB or palpitations.  HD in place.    penicillin (Unknown)    MEDICATIONS  (STANDING):  albumin human 25% IVPB 50 milliLiter(s) IV Intermittent once  aMIOdarone    Tablet   Oral   aMIOdarone    Tablet 400 milliGRAM(s) Oral every 8 hours  ascorbic acid 500 milliGRAM(s) Oral daily  aspirin enteric coated 81 milliGRAM(s) Oral daily  atorvastatin 40 milliGRAM(s) Oral at bedtime  chlorhexidine 2% Cloths 1 Application(s) Topical daily  folic acid 1 milliGRAM(s) Oral daily  gabapentin 100 milliGRAM(s) Oral at bedtime  levETIRAcetam 750 milliGRAM(s) Oral two times a day  melatonin 5 milliGRAM(s) Oral at bedtime  metoprolol tartrate 50 milliGRAM(s) Oral two times a day  multivitamin 1 Tablet(s) Oral daily  norepinephrine Infusion 0.05 MICROgram(s)/kG/Min (10.5 mL/Hr) IV Continuous <Continuous>  pantoprazole    Tablet 40 milliGRAM(s) Oral two times a day  QUEtiapine 25 milliGRAM(s) Oral at bedtime  senna 2 Tablet(s) Oral at bedtime  sodium chloride 0.9%. 1000 milliLiter(s) (10 mL/Hr) IV Continuous <Continuous>    MEDICATIONS  (PRN):  benzocaine 15 mG/menthol 3.6 mG (Sugar-Free) Lozenge 1 Lozenge Oral three times a day PRN Sore Throat  lidocaine   Patch 1 Patch Transdermal every 24 hours PRN as needed  oxyCODONE    IR 5 milliGRAM(s) Oral every 4 hours PRN Moderate Pain (4 - 6)  oxyCODONE    IR 10 milliGRAM(s) Oral every 4 hours PRN Severe Pain (7 - 10)      Vital Signs Last 24 Hrs  T(C): 36.7 (22 Nov 2020 11:04), Max: 36.7 (21 Nov 2020 17:20)  T(F): 98 (22 Nov 2020 11:04), Max: 98.1 (21 Nov 2020 21:28)  HR: 99 (22 Nov 2020 13:00) (99 - 103)  BP: 146/76 (22 Nov 2020 13:00) (95/67 - 150/93)  BP(mean): 100 (22 Nov 2020 13:00) (73 - 113)  RR: 12 (22 Nov 2020 13:00) (8 - 27)  SpO2: 99% (22 Nov 2020 13:00) (92% - 100%)    I&O's Detail    21 Nov 2020 07:01  -  22 Nov 2020 07:00  --------------------------------------------------------  IN:    IV PiggyBack: 100 mL    Oral Fluid: 240 mL  Total IN: 340 mL    OUT:    Chest Tube (mL): 0 mL    Chest Tube (mL): 280 mL    Chest Tube (mL): 200 mL    Other (mL): 1000 mL  Total OUT: 1480 mL    Total NET: -1140 mL      22 Nov 2020 07:01  -  22 Nov 2020 13:26  --------------------------------------------------------  IN:    Albumin 5%  - 250 mL: 50 mL  Total IN: 50 mL    OUT:  Total OUT: 0 mL    Total NET: 50 mL        I&O's Summary    21 Nov 2020 07:01  -  22 Nov 2020 07:00  --------------------------------------------------------  IN: 340 mL / OUT: 1480 mL / NET: -1140 mL    22 Nov 2020 07:01  -  22 Nov 2020 13:26  --------------------------------------------------------  IN: 50 mL / OUT: 0 mL / NET: 50 mL            PHYSICAL EXAM:  General: Appears well developed, well nourished, no acute distress  HEENT: Head: normocephalic, atraumatic  Eyes: Pupils equal and reactive  Neck: Supple, no carotid bruit, no JVD, no HJR  CARDIOVASCULAR: Tachycardic, regular, Normal S1 and S2, no murmur, rub, or gallop  LUNGS: Clear to auscultation bilaterally, no rales, rhonchi or wheeze  ABDOMEN: Soft, nontender, non-distended, positive bowel sounds, no mass or bruit  EXTREMITIES: Trace LE edema, presacral edema noted, distal pulses WNL  SKIN: Warm and dry with normal turgor  NEURO: Alert & oriented x 3, grossly intact  PSYCH: normal mood and affect        LABS:                        9.6    9.00  )-----------( 104      ( 22 Nov 2020 03:15 )             30.3     11-22    133<L>  |  97<L>  |  44.0<H>  ----------------------------<  111<H>  4.3   |  24.0  |  3.58<H>    Ca    8.4<L>      22 Nov 2020 03:15  Phos  4.9     11-22  Mg     2.2     11-22          PT/INR - ( 22 Nov 2020 04:33 )   PT: 28.9 sec;   INR: 2.61 ratio           serum  Lipids:     Thyroid Stimulating Hormone, Serum: 1.95 uIU/mL (11-03 @ 08:18)      RADIOLOGY & ADDITIONAL STUDIES:    TELEMETRY PERSONALLY REVIEWED: AT/AFlutter 100s, LBBB, PVCs      ASSESSMENT AND PLAN:  In summary, JONATHAN GIBSON is a 60M s/p re-op CABGx2 (SVG->OM/RPDA), bioAVR & bioMVR 11/13/20, post-op junctional rhythm resolved, now AT/AFlutter 100s, LBBB, ARF/CRI now on HD (s/p permacath 11/20/20), anemia s/p PRBCs, volume overload improving, h/o CAD s/p stent LAD, normal LV fxn, type A dissection repair 2010, known chronic type B dissection, chronic diastolic CHF, CVA w/ L-sided sensory loss, DM, HTN, HL, psoriasis on Humira, likely COVID in 9/20 (+ Ab), prior cocaine/opioid/benzo/EtoH abuse  - Patient improving clinically post-operatively, s/p reop AVR 25mm CE [id number], MVR 31mm Magna Ease, CABGx2 (DGV-OM, RPDA) with Dr. Butler 11/13/20.  - Cardiac cath 11/10/20: severe 2 vessel CAD.  Chronically occluded proximal RCA with collaterals from the LCA.  EF 50%.  Elevated LVEDP.  - Echocardiogram 11/4/20 EF 50-55%, grade II diastolic dysfunction, mildly reduced RV systolic function, severe LAE, mild KOLTON, mod-severe MR and AI, mod TR, dilated aortic root @ 4.2 cm  - Junctional rhythm post-operatively resolved, no events overnight, LBBB present.  No indication for a PPM at this time.  Patient was evaluated by Dr. Raygoza (EP).  Amiodarone load in place with 400 tid x12 doses then 200 daily along with Lopressor 50 bid.  Rhythm now AT/Aflutter with HR 100s, BP stable.  Monitor vitals closely and titrate meds PRN.  - ASA 81 daily in place  - Lipitor 40 daily in place  - ARF/CRI now on HD, s/p permacath.  Nephrology follow-up.  - Patient with AFlutter with increased [id number] risk score currently maintained on oral AC with Coumadin.  Dosing per primary team.  Goal INR 2-3.  - DM.  Glycemic control per primary team.  - Valvular heart disease. s/p reoperative AVR and now MVR. SBE prophylaxis for procedures that entail bacteremia.  - Seizure disorder. Keppra in place.  - Low platelets. No evidence of active bleeding. Continue to monitor  - Pain management as per CTS    Ramon Raymundo MD

## 2020-11-22 NOTE — PROGRESS NOTE ADULT - SUBJECTIVE AND OBJECTIVE BOX
Subjective:  59yo M resting comfortable, refusing bipap at night.      HPI:  Patient is a 60 yoM with PMH of CAD s/p stent in LAD, leaky aortic valve, uncontrolled bp, thoracic aortic dissection requiring emergent surgery, CVA with left sided sensory loss, colostomy for bowel ischemia s/p reversal, psoriasis on Humira injections presents from home with sob and le edema. Patient states the past several weeks, he has not been taking his medications because he forgot. Patient states the sob has gotten worse over the past few days and also associated with chest pressure. Patient also hasnt seen his cardiologist in several months. Patient worked up in the er and had an elevated bnp and htn urgency and eric. Patient seen at bedside and states feeling a little better.  (2020 14:32)          PAST MEDICAL & SURGICAL HISTORY:  CVA (cerebral vascular accident)    CHF (congestive heart failure)    H/O colectomy    Aorta disorder            MEDICATIONS  (STANDING):  aMIOdarone    Tablet   Oral   aMIOdarone    Tablet 400 milliGRAM(s) Oral every 8 hours  ascorbic acid 500 milliGRAM(s) Oral daily  aspirin enteric coated 81 milliGRAM(s) Oral daily  atorvastatin 40 milliGRAM(s) Oral at bedtime  chlorhexidine 2% Cloths 1 Application(s) Topical daily  folic acid 1 milliGRAM(s) Oral daily  gabapentin 100 milliGRAM(s) Oral at bedtime  levETIRAcetam 750 milliGRAM(s) Oral two times a day  melatonin 5 milliGRAM(s) Oral at bedtime  metoprolol tartrate 50 milliGRAM(s) Oral two times a day  multivitamin 1 Tablet(s) Oral daily  pantoprazole    Tablet 40 milliGRAM(s) Oral two times a day  QUEtiapine 25 milliGRAM(s) Oral at bedtime  senna 2 Tablet(s) Oral at bedtime  sodium chloride 0.9%. 1000 milliLiter(s) (10 mL/Hr) IV Continuous <Continuous>    MEDICATIONS  (PRN):  benzocaine 15 mG/menthol 3.6 mG (Sugar-Free) Lozenge 1 Lozenge Oral three times a day PRN Sore Throat  lidocaine   Patch 1 Patch Transdermal every 24 hours PRN as needed  oxyCODONE    IR 5 milliGRAM(s) Oral every 4 hours PRN Moderate Pain (4 - 6)  oxyCODONE    IR 10 milliGRAM(s) Oral every 4 hours PRN Severe Pain (7 - 10)          Allergies    penicillin (Unknown)    Intolerances          WEIGHTS:  Daily     Daily Weight in k.3 (2020 12:35)  Admit Wt: Drug Dosing Weight  Height (cm): 165.1 (2020 08:55)  Weight (kg): 112 (2020 12:00)  BMI (kg/m2): 41.1 (2020 12:00)  BSA (m2): 2.16 (2020 12:)  I&O's Summary    :01  -  2020 07:  --------------------------------------------------------  IN: 340 mL / OUT: 610 mL / NET: -270 mL    2020 07:01  -  2020 04:21  --------------------------------------------------------  IN: 340 mL / OUT: 1410 mL / NET: -1070 mL        VITAL SIGNS:  ICU Vital Signs Last 24 Hrs  T(C): 36.7 (2020 00:30), Max: 36.8 (2020 07:30)  T(F): 98 (2020 00:30), Max: 98.3 (2020 07:30)  HR: 101 (2020 04:00) (97 - 104)  BP: 114/77 (:00) (92/69 - 131/79)  BP(mean): 90 (:00) (73 - 101)  ABP: --  ABP(mean): --  RR: 10 (2020 04:00) (8 - 27)  SpO2: 99% (:00) (92% - 100%)        All laboratory results, radiology and medications reviewed.    LABS:  11-    133<L>  |  97<L>  |  44.0<H>  ----------------------------<  111<H>  4.3   |  24.0  |  3.58<H>    Ca    8.4<L>      2020 03:15  Phos  4.9     11-  Mg     2.2     -                                   9.6    9.00  )-----------( 104      ( 2020 03:15 )             30.3          PT/INR - ( 2020 04:08 )   PT: 16.0 sec;   INR: 1.40 ratio                   CAPILLARY BLOOD GLUCOSE                 PHYSICAL EXAM:  General:  well nourished, no acute distress  Neurology:  alert and oriented X 4, nonfocal, no gross deficits  Respiratory:  clear to auscultation bilaterally  CV:  A Flutter, rate 100-105  Abdomen:  soft, nontender, nondistended, positive bowel sounds  Extremities:  warm, well perfused, no edema +DP pulses  Incisions:  midline sternal incision, c/d/i, sternum stable

## 2020-11-23 LAB
ALBUMIN SERPL ELPH-MCNC: 3.1 G/DL — LOW (ref 3.3–5.2)
ALP SERPL-CCNC: 145 U/L — HIGH (ref 40–120)
ALT FLD-CCNC: 35 U/L — SIGNIFICANT CHANGE UP
ANION GAP SERPL CALC-SCNC: 13 MMOL/L — SIGNIFICANT CHANGE UP (ref 5–17)
APTT BLD: 38.1 SEC — HIGH (ref 27.5–35.5)
AST SERPL-CCNC: 47 U/L — HIGH
BILIRUB DIRECT SERPL-MCNC: 1.5 MG/DL — HIGH (ref 0–0.3)
BILIRUB INDIRECT FLD-MCNC: 0.8 MG/DL — SIGNIFICANT CHANGE UP (ref 0.2–1)
BILIRUB SERPL-MCNC: 2.2 MG/DL — HIGH (ref 0.4–2)
BUN SERPL-MCNC: 36 MG/DL — HIGH (ref 8–20)
CALCIUM SERPL-MCNC: 8.6 MG/DL — SIGNIFICANT CHANGE UP (ref 8.6–10.2)
CHLORIDE SERPL-SCNC: 96 MMOL/L — LOW (ref 98–107)
CO2 SERPL-SCNC: 26 MMOL/L — SIGNIFICANT CHANGE UP (ref 22–29)
CREAT SERPL-MCNC: 3.59 MG/DL — HIGH (ref 0.5–1.3)
GAS PNL BLDA: SIGNIFICANT CHANGE UP
GLUCOSE SERPL-MCNC: 96 MG/DL — SIGNIFICANT CHANGE UP (ref 70–99)
HCT VFR BLD CALC: 30.8 % — LOW (ref 39–50)
HGB BLD-MCNC: 9.8 G/DL — LOW (ref 13–17)
INR BLD: 3.49 RATIO — HIGH (ref 0.88–1.16)
INR BLD: 4.53 RATIO — HIGH (ref 0.88–1.16)
MAGNESIUM SERPL-MCNC: 2.2 MG/DL — SIGNIFICANT CHANGE UP (ref 1.6–2.6)
MCHC RBC-ENTMCNC: 30.5 PG — SIGNIFICANT CHANGE UP (ref 27–34)
MCHC RBC-ENTMCNC: 31.8 GM/DL — LOW (ref 32–36)
MCV RBC AUTO: 96 FL — SIGNIFICANT CHANGE UP (ref 80–100)
PHOSPHATE SERPL-MCNC: 5.1 MG/DL — HIGH (ref 2.4–4.7)
PLATELET # BLD AUTO: 123 K/UL — LOW (ref 150–400)
POTASSIUM SERPL-MCNC: 4.5 MMOL/L — SIGNIFICANT CHANGE UP (ref 3.5–5.3)
POTASSIUM SERPL-SCNC: 4.5 MMOL/L — SIGNIFICANT CHANGE UP (ref 3.5–5.3)
PROT SERPL-MCNC: 6.2 G/DL — LOW (ref 6.6–8.7)
PROTHROM AB SERPL-ACNC: 38.1 SEC — HIGH (ref 10.6–13.6)
PROTHROM AB SERPL-ACNC: 48.9 SEC — HIGH (ref 10.6–13.6)
RBC # BLD: 3.21 M/UL — LOW (ref 4.2–5.8)
RBC # FLD: 15.2 % — HIGH (ref 10.3–14.5)
SODIUM SERPL-SCNC: 135 MMOL/L — SIGNIFICANT CHANGE UP (ref 135–145)
WBC # BLD: 11.32 K/UL — HIGH (ref 3.8–10.5)
WBC # FLD AUTO: 11.32 K/UL — HIGH (ref 3.8–10.5)

## 2020-11-23 PROCEDURE — 71045 X-RAY EXAM CHEST 1 VIEW: CPT | Mod: 26

## 2020-11-23 PROCEDURE — 99024 POSTOP FOLLOW-UP VISIT: CPT

## 2020-11-23 PROCEDURE — 99233 SBSQ HOSP IP/OBS HIGH 50: CPT

## 2020-11-23 PROCEDURE — 74018 RADEX ABDOMEN 1 VIEW: CPT | Mod: 26

## 2020-11-23 RX ORDER — ONDANSETRON 8 MG/1
4 TABLET, FILM COATED ORAL ONCE
Refills: 0 | Status: COMPLETED | OUTPATIENT
Start: 2020-11-23 | End: 2020-11-23

## 2020-11-23 RX ORDER — METOPROLOL TARTRATE 50 MG
50 TABLET ORAL EVERY 6 HOURS
Refills: 0 | Status: DISCONTINUED | OUTPATIENT
Start: 2020-11-23 | End: 2020-11-30

## 2020-11-23 RX ORDER — PHYTONADIONE (VIT K1) 5 MG
1 TABLET ORAL ONCE
Refills: 0 | Status: COMPLETED | OUTPATIENT
Start: 2020-11-23 | End: 2020-11-23

## 2020-11-23 RX ORDER — PHYTONADIONE (VIT K1) 5 MG
5 TABLET ORAL ONCE
Refills: 0 | Status: COMPLETED | OUTPATIENT
Start: 2020-11-23 | End: 2020-11-23

## 2020-11-23 RX ORDER — CALCIUM GLUCONATE 100 MG/ML
2 VIAL (ML) INTRAVENOUS ONCE
Refills: 0 | Status: COMPLETED | OUTPATIENT
Start: 2020-11-23 | End: 2020-11-23

## 2020-11-23 RX ADMIN — Medication 5 MILLIGRAM(S): at 06:17

## 2020-11-23 RX ADMIN — PANTOPRAZOLE SODIUM 40 MILLIGRAM(S): 20 TABLET, DELAYED RELEASE ORAL at 06:19

## 2020-11-23 RX ADMIN — Medication 100.2 MILLIGRAM(S): at 16:05

## 2020-11-23 RX ADMIN — Medication 100.2 MILLIGRAM(S): at 09:24

## 2020-11-23 RX ADMIN — Medication 81 MILLIGRAM(S): at 13:38

## 2020-11-23 RX ADMIN — Medication 5 MILLIGRAM(S): at 22:18

## 2020-11-23 RX ADMIN — Medication 10 MILLIGRAM(S): at 22:18

## 2020-11-23 RX ADMIN — AMIODARONE HYDROCHLORIDE 400 MILLIGRAM(S): 400 TABLET ORAL at 22:18

## 2020-11-23 RX ADMIN — OXYCODONE HYDROCHLORIDE 10 MILLIGRAM(S): 5 TABLET ORAL at 09:10

## 2020-11-23 RX ADMIN — QUETIAPINE FUMARATE 25 MILLIGRAM(S): 200 TABLET, FILM COATED ORAL at 22:52

## 2020-11-23 RX ADMIN — Medication 200 GRAM(S): at 06:18

## 2020-11-23 RX ADMIN — ATORVASTATIN CALCIUM 40 MILLIGRAM(S): 80 TABLET, FILM COATED ORAL at 22:18

## 2020-11-23 RX ADMIN — Medication 50 MILLIGRAM(S): at 13:40

## 2020-11-23 RX ADMIN — OXYCODONE HYDROCHLORIDE 10 MILLIGRAM(S): 5 TABLET ORAL at 09:40

## 2020-11-23 RX ADMIN — SENNA PLUS 2 TABLET(S): 8.6 TABLET ORAL at 22:18

## 2020-11-23 RX ADMIN — LIDOCAINE 1 PATCH: 4 CREAM TOPICAL at 07:45

## 2020-11-23 RX ADMIN — ONDANSETRON 4 MILLIGRAM(S): 8 TABLET, FILM COATED ORAL at 18:00

## 2020-11-23 RX ADMIN — Medication 50 MILLIGRAM(S): at 06:18

## 2020-11-23 RX ADMIN — AMIODARONE HYDROCHLORIDE 400 MILLIGRAM(S): 400 TABLET ORAL at 06:18

## 2020-11-23 RX ADMIN — Medication 1 TABLET(S): at 13:39

## 2020-11-23 RX ADMIN — Medication 1 MILLIGRAM(S): at 13:39

## 2020-11-23 RX ADMIN — LEVETIRACETAM 750 MILLIGRAM(S): 250 TABLET, FILM COATED ORAL at 06:18

## 2020-11-23 RX ADMIN — GABAPENTIN 100 MILLIGRAM(S): 400 CAPSULE ORAL at 22:18

## 2020-11-23 RX ADMIN — AMIODARONE HYDROCHLORIDE 400 MILLIGRAM(S): 400 TABLET ORAL at 16:05

## 2020-11-23 RX ADMIN — Medication 500 MILLIGRAM(S): at 13:38

## 2020-11-23 RX ADMIN — CHLORHEXIDINE GLUCONATE 1 APPLICATION(S): 213 SOLUTION TOPICAL at 13:39

## 2020-11-23 RX ADMIN — Medication 50 MILLIGRAM(S): at 22:18

## 2020-11-23 RX ADMIN — LIDOCAINE 1 PATCH: 4 CREAM TOPICAL at 09:30

## 2020-11-23 NOTE — PROGRESS NOTE ADULT - ASSESSMENT
Martin on CKD; requiring HD  CABG s/p AVR/MVR  Hypotension    Plan for HD tomorrow  UF as tolerated with aid of sodium modelling/ low dialysate temp  Monitor lytes, BP

## 2020-11-23 NOTE — PROGRESS NOTE ADULT - SUBJECTIVE AND OBJECTIVE BOX
[facility], [facility]                                          [address] , [address], [place] [address]                                               PHONE: [phone]    FAX: [phone] [address], [address], [place] [address]                                       PHONE: [phone]    FAX: [phone]  *******************************************************************************    Overnight events/Subjective Assessment: OOB this AM. No overnight cardiac events.  No CP/palp/SOB.  C/o fatigue    INTERPRETATION OF TELEMETRY (personally reviewed): atrial tachycardia 90-100s    penicillin (Unknown)    MEDICATIONS  (STANDING):  aMIOdarone    Tablet   Oral   aMIOdarone    Tablet 400 milliGRAM(s) Oral every 8 hours  ascorbic acid 500 milliGRAM(s) Oral daily  aspirin enteric coated 81 milliGRAM(s) Oral daily  atorvastatin 40 milliGRAM(s) Oral at bedtime  chlorhexidine 2% Cloths 1 Application(s) Topical daily  folic acid 1 milliGRAM(s) Oral daily  gabapentin 100 milliGRAM(s) Oral at bedtime  levETIRAcetam 750 milliGRAM(s) Oral two times a day  melatonin 5 milliGRAM(s) Oral at bedtime  metoprolol tartrate 50 milliGRAM(s) Oral every 8 hours  multivitamin 1 Tablet(s) Oral daily  norepinephrine Infusion 0.05 MICROgram(s)/kG/Min (10.5 mL/Hr) IV Continuous <Continuous>  pantoprazole    Tablet 40 milliGRAM(s) Oral two times a day  phytonadione  IVPB 1 milliGRAM(s) IV Intermittent once  QUEtiapine 25 milliGRAM(s) Oral at bedtime  senna 2 Tablet(s) Oral at bedtime  sodium chloride 0.9%. 1000 milliLiter(s) (10 mL/Hr) IV Continuous <Continuous>    MEDICATIONS  (PRN):  benzocaine 15 mG/menthol 3.6 mG (Sugar-Free) Lozenge 1 Lozenge Oral three times a day PRN Sore Throat  lidocaine   Patch 1 Patch Transdermal every 24 hours PRN as needed  oxyCODONE    IR 5 milliGRAM(s) Oral every 4 hours PRN Moderate Pain (4 - 6)  oxyCODONE    IR 10 milliGRAM(s) Oral every 4 hours PRN Severe Pain (7 - 10)      Vital Signs Last 24 Hrs  T(C): 36.9 (23 Nov 2020 00:51), Max: 36.9 (23 Nov 2020 00:51)  T(F): 98.4 (23 Nov 2020 00:51), Max: 98.4 (23 Nov 2020 00:51)  HR: 99 (23 Nov 2020 06:15) (93 - 101)  BP: 132/67 (23 Nov 2020 06:15) (93/56 - 150/93)  BP(mean): 96 (23 Nov 2020 06:15) (69 - 113)  RR: 21 (23 Nov 2020 06:15) (8 - 21)  SpO2: 97% (23 Nov 2020 06:15) (91% - 100%)    I&O's Detail    22 Nov 2020 07:01  -  23 Nov 2020 07:00  --------------------------------------------------------  IN:    Albumin 5%  - 250 mL: 50 mL    IV PiggyBack: 100 mL    Oral Fluid: 175 mL  Total IN: 325 mL    OUT:    Chest Tube (mL): 80 mL    Chest Tube (mL): 0 mL    Chest Tube (mL): 100 mL    Other (mL): 1700 mL  Total OUT: 1880 mL    Total NET: -1555 mL        I&O's Summary    22 Nov 2020 07:01  -  23 Nov 2020 07:00  --------------------------------------------------------  IN: 325 mL / OUT: 1880 mL / NET: -1555 mL            PHYSICAL EXAM:  General: Appears well developed, well nourished, no acute distress  HEENT: Head: normocephalic, atraumatic  Eyes: Pupils equal and reactive  Neck: Supple, no carotid bruit, no JVD, no HJR  CARDIOVASCULAR: Tachycardic, regular, Normal S1 and S2, no murmur, rub, or gallop  LUNGS: Diminished at bases, otherwise clear to auscultation bilaterally, no rales, rhonchi or wheeze  CHEST: Clean sternotomy dressing  ABDOMEN: Soft, nontender, non-distended, positive bowel sounds, no mass or bruit  EXTREMITIES: Trace LE edema, presacral edema noted, distal pulses WNL, dressings c/d/i  SKIN: Warm and dry with normal turgor  NEURO: Alert & oriented x 3, grossly intact  PSYCH: normal mood and affect          LABS:                        9.8    11.32 )-----------( 123      ( 23 Nov 2020 03:07 )             30.8     11-23    135  |  96<L>  |  36.0<H>  ----------------------------<  96  4.5   |  26.0  |  3.59<H>    Ca    8.6      23 Nov 2020 03:07  Phos  5.1     11-23  Mg     2.2     11-23          PT/INR - ( 23 Nov 2020 03:07 )   PT: 48.9 sec;   INR: 4.53 ratio           serum  Lipids:     Thyroid Stimulating Hormone, Serum: 1.95 uIU/mL (11-03 @ 08:18)        ASSESSMENT AND PLAN:  In summary, JONATHAN GIBSON is a 60M s/p re-op CABGx2 (SVG->OM/RPDA), bioAVR & bioMVR 11/13/20, post-op junctional rhythm resolved, now AT/AFlutter 100s, LBBB, ARF/CRI now on HD (s/p permacath 11/20/20), anemia s/p PRBCs, volume overload improving, h/o CAD s/p stent LAD, normal LV fxn, type A dissection repair 2010, known chronic type B dissection, chronic diastolic CHF, CVA w/ L-sided sensory loss, DM, HTN, HL, psoriasis on Humira, likely COVID in 9/20 (+ Ab), prior cocaine/opioid/benzo/EtoH abuse    - Patient improving clinically post-operatively, s/p reop AVR 25mm CE [id number], MVR 31mm Magna Ease, CABGx2 (DGV-OM, RPDA) with Dr. Butler 11/13/20.  - Cardiac cath 11/10/20: severe 2 vessel CAD.  Chronically occluded proximal RCA with collaterals from the LCA.  EF 50%.  Elevated LVEDP.  - Echocardiogram 11/4/20 EF 50-55%, grade II diastolic dysfunction, mildly reduced RV systolic function, severe LAE, mild KOLTON, mod-severe MR and AI, mod TR, dilated aortic root @ 4.2 cm  - Junctional rhythm post-operatively resolved, no events overnight, LBBB present.  No indication for a PPM at this time.  Patient was evaluated by Dr. Raygoza (EP).  Amiodarone load in place with 400 tid x12 doses then 200 daily.  Agree with uptitration of Lopressor to 50 q8h.  Rhythm now AT with HR 90-100s, BP stable.  Monitor vitals closely and titrate meds PRN.  Should he develop TBS, a PPM will be indicated.  - ASA 81 daily in place  - Lipitor 40 daily in place  - ARF/CRI now on HD, s/p permacath.  Nephrology follow-up.  - Patient with AFlutter with increased [id number] risk score currently maintained on oral AC with Coumadin.  Dosing per primary team.  Goal INR 2-3.  - DM.  Glycemic control per primary team.  - Valvular heart disease. s/p reoperative AVR and now MVR. SBE prophylaxis for procedures that entail bacteremia.  - Seizure disorder. Keppra in place.  - Low platelets, improving.  No evidence of active bleeding. Further management as per CTS.  - Pain management as per CTS  - d/w Dr. Luke Stockton MD

## 2020-11-23 NOTE — ED PROVIDER NOTE - CARDIOVASCULAR [-], MLM
38 y/o female h/o sz d/o, encephalitis, ovarian CA, c/o left ear pain x 1-2 weeks, constant, denies radiation, denies modifying factors, denies recent trauma, paresthesias, focal weakness, fevers, chills, neck stiffness, visual disturbances or pain, facial swelling, dental pain or other associated complaints at present.     Old chart reviewed.  I have reviewed and agree with the nursing note, except as documented in my note.    VSS, awake, alert, non-toxic appearing, lying comfortably in stretcher, in NAD, PERRL / EOMI, no nystagmus, external ears are normal, lft tm is erythematous, opaque, no d/c, EAC appears normal, rt EAC / TM appears normal. There is no mastoid tenderness or erythema. There is no tenderness to palpation of the frontal and maxillary sinuses, no facial erythema or swelling. The lips, gums and teeth appear without trauma or obvious abnormality, mmm, no exudates or erosions on the buccal mucosa, uvula is mid-line, tonsils are not inflamed or edematous, posterior pharynx is free of erythema and exudates.
no chest pain/no peripheral edema

## 2020-11-23 NOTE — PROGRESS NOTE ADULT - SUBJECTIVE AND OBJECTIVE BOX
Subjective - patient sitting comfortably in bed, no complaints at this time. Refusing bipap. Convinced patient to wear bipap.    HPI:  Patient is a 60 yoM with PMH of CAD s/p stent in LAD, leaky aortic valve, uncontrolled bp, thoracic aortic dissection requiring emergent surgery, CVA with left sided sensory loss, colostomy for bowel ischemia s/p reversal, psoriasis on Humira injections presents from home with sob and le edema. Patient states the past several weeks, he has not been taking his medications because he forgot. Patient states the sob has gotten worse over the past few days and also associated with chest pressure. Patient also hasnt seen his cardiologist in several months. Patient worked up in the er and had an elevated bnp and htn urgency and eric. Patient seen at bedside and states feeling a little better.  (02 Nov 2020 14:32)      Brief summary:  60yMale POD# 10 CABG AVR/MVR    Overnight events: none      PAST MEDICAL & SURGICAL HISTORY:  CVA (cerebral vascular accident)    CHF (congestive heart failure)    H/O colectomy    Aorta disorder          aMIOdarone    Tablet   Oral   aMIOdarone    Tablet 400 milliGRAM(s) Oral every 8 hours  ascorbic acid 500 milliGRAM(s) Oral daily  aspirin enteric coated 81 milliGRAM(s) Oral daily  atorvastatin 40 milliGRAM(s) Oral at bedtime  benzocaine 15 mG/menthol 3.6 mG (Sugar-Free) Lozenge 1 Lozenge Oral three times a day PRN  chlorhexidine 2% Cloths 1 Application(s) Topical daily  folic acid 1 milliGRAM(s) Oral daily  gabapentin 100 milliGRAM(s) Oral at bedtime  levETIRAcetam 750 milliGRAM(s) Oral two times a day  lidocaine   Patch 1 Patch Transdermal every 24 hours PRN  melatonin 5 milliGRAM(s) Oral at bedtime  metoprolol tartrate 50 milliGRAM(s) Oral every 8 hours  multivitamin 1 Tablet(s) Oral daily  norepinephrine Infusion 0.05 MICROgram(s)/kG/Min IV Continuous <Continuous>  oxyCODONE    IR 5 milliGRAM(s) Oral every 4 hours PRN  oxyCODONE    IR 10 milliGRAM(s) Oral every 4 hours PRN  pantoprazole    Tablet 40 milliGRAM(s) Oral two times a day  QUEtiapine 25 milliGRAM(s) Oral at bedtime  senna 2 Tablet(s) Oral at bedtime  sodium chloride 0.9%. 1000 milliLiter(s) IV Continuous <Continuous>  MEDICATIONS  (PRN):  benzocaine 15 mG/menthol 3.6 mG (Sugar-Free) Lozenge 1 Lozenge Oral three times a day PRN Sore Throat  lidocaine   Patch 1 Patch Transdermal every 24 hours PRN as needed  oxyCODONE    IR 5 milliGRAM(s) Oral every 4 hours PRN Moderate Pain (4 - 6)  oxyCODONE    IR 10 milliGRAM(s) Oral every 4 hours PRN Severe Pain (7 - 10)      Daily     Daily                               9.6    9.00  )-----------( 104      ( 22 Nov 2020 03:15 )             30.3   11-22    133<L>  |  97<L>  |  44.0<H>  ----------------------------<  111<H>  4.3   |  24.0  |  3.58<H>    Ca    8.4<L>      22 Nov 2020 03:15  Phos  4.9     11-22  Mg     2.2     11-22        PT/INR - ( 22 Nov 2020 04:33 )   PT: 28.9 sec;   INR: 2.61 ratio               Objective:  T(C): 36.7 (11-22-20 @ 21:00), Max: 36.7 (11-22-20 @ 00:30)  HR: 93 (11-23-20 @ 00:00) (93 - 102)  BP: 94/64 (11-23-20 @ 00:00) (94/62 - 150/93)  RR: 10 (11-23-20 @ 00:00) (10 - 22)  SpO2: 99% (11-23-20 @ 00:00) (91% - 100%)  Wt(kg): --CAPILLARY BLOOD GLUCOSE      I&O's Summary    21 Nov 2020 07:01  -  22 Nov 2020 07:00  --------------------------------------------------------  IN: 340 mL / OUT: 1480 mL / NET: -1140 mL    22 Nov 2020 07:01  -  23 Nov 2020 00:25  --------------------------------------------------------  IN: 225 mL / OUT: 1780 mL / NET: -1555 mL        Physical Exam  Neuro: A+O x 3, non-focal, speech clear and intact  Pulm: CTA, equal bilaterally, no wheezes, rales  CV: RRR,  +S1S2, no m/r/g  Abd: soft, NT, ND, +BS  Ext: +DP Pulses b/l, 2+ nonpitting edema to b/l LE  Inc: MSI C/D/I/stable w/ dressing, LLE C/D/I with Ace wrap  Chest tubes: mediastinal and pleural ct in place, draining appropriately, no signs of airleaks. dressing c/d/i  Imaging:  CXR:  < from: Xray Chest 1 View- PORTABLE-Routine (11.22.20 @ 06:18) >  IMPRESSION:  Status post open heart surgery.  No evidence of active chest pathology.  Catheters and/or tubes in place    < end of copied text >

## 2020-11-23 NOTE — PROGRESS NOTE ADULT - SUBJECTIVE AND OBJECTIVE BOX
[facility] DIVISION OF KIDNEY DISEASES AND HYPERTENSION -- HEMODIALYSIS NOTE  --------------------------------------------------------------------------------  Chief Complaint: CKD/Ongoing hemodialysis requirement    24 hour events/subjective:  Pt is confused  unable to obtain ROS      PAST HISTORY  --------------------------------------------------------------------------------  No significant changes to PMH, PSH, FHx, SHx, unless otherwise noted    ALLERGIES & MEDICATIONS  --------------------------------------------------------------------------------  Allergies    penicillin (Unknown)    Intolerances      Standing Inpatient Medications  aMIOdarone    Tablet   Oral   aMIOdarone    Tablet 400 milliGRAM(s) Oral every 8 hours  ascorbic acid 500 milliGRAM(s) Oral daily  aspirin enteric coated 81 milliGRAM(s) Oral daily  atorvastatin 40 milliGRAM(s) Oral at bedtime  chlorhexidine 2% Cloths 1 Application(s) Topical daily  folic acid 1 milliGRAM(s) Oral daily  gabapentin 100 milliGRAM(s) Oral at bedtime  levETIRAcetam 750 milliGRAM(s) Oral two times a day  melatonin 5 milliGRAM(s) Oral at bedtime  metoprolol tartrate 50 milliGRAM(s) Oral every 6 hours  multivitamin 1 Tablet(s) Oral daily  norepinephrine Infusion 0.05 MICROgram(s)/kG/Min IV Continuous <Continuous>  pantoprazole    Tablet 40 milliGRAM(s) Oral two times a day  phytonadione  IVPB 1 milliGRAM(s) IV Intermittent once  QUEtiapine 25 milliGRAM(s) Oral at bedtime  senna 2 Tablet(s) Oral at bedtime  sodium chloride 0.9%. 1000 milliLiter(s) IV Continuous <Continuous>    PRN Inpatient Medications  benzocaine 15 mG/menthol 3.6 mG (Sugar-Free) Lozenge 1 Lozenge Oral three times a day PRN  lidocaine   Patch 1 Patch Transdermal every 24 hours PRN  oxyCODONE    IR 5 milliGRAM(s) Oral every 4 hours PRN  oxyCODONE    IR 10 milliGRAM(s) Oral every 4 hours PRN      REVIEW OF SYSTEMS  --------------------------------------------------------------------------------  unable to obtain    VITALS/PHYSICAL EXAM  --------------------------------------------------------------------------------  T(C): 37.1 (11-23-20 @ 12:00), Max: 37.1 (11-23-20 @ 12:00)  HR: 94 (11-23-20 @ 14:00) (92 - 100)  BP: 101/62 (11-23-20 @ 14:00) (85/50 - 132/67)  RR: 15 (11-23-20 @ 14:00) (7 - 33)  SpO2: 97% (11-23-20 @ 14:00) (85% - 100%)  Wt(kg): --        11-22-20 @ 07:01  -  11-23-20 @ 07:00  --------------------------------------------------------  IN: 325 mL / OUT: 1880 mL / NET: -1555 mL    11-23-20 @ 07:01  -  11-23-20 @ 15:08  --------------------------------------------------------  IN: 290 mL / OUT: 110 mL / NET: 180 mL      Physical Exam:  	Gen: NAD  	HEENT: PERRL, supple neck,  	Pulm: CTA B/L  	CV: RRR, S1S2; no rub  	Abd: +BS, soft, nontender  	UE: Warm, intact strength; no asterixis  	LE: Warm, + edema  	Neuro: No focal deficits  	Psych: Normal affect and mood  	Skin: Warm, without rashes  	Vascular access: tunneled HD catheter    LABS/STUDIES  --------------------------------------------------------------------------------              9.8    11.32 >-----------<  123      [11-23-20 @ 03:07]              30.8     135  |  96  |  36.0  ----------------------------<  96      [11-23-20 @ 03:07]  4.5   |  26.0  |  3.59        Ca     8.6     [11-23-20 @ 03:07]      Mg     2.2     [11-23-20 @ 03:07]      Phos  5.1     [11-23-20 @ 03:07]      PT/INR: PT 38.1 , INR 3.49       [11-23-20 @ 12:35]  PTT: 38.1       [11-23-20 @ 12:35]      Iron 29, TIBC 332, %sat 9      [11-03-20 @ 08:18]  Ferritin 230      [11-03-20 @ 08:18]  TSH 1.95      [11-03-20 @ 08:18]  Lipid: chol --, , HDL --, LDL --      [11-15-20 @ 02:40]    HBsAb <3.0      [11-07-20 @ 05:41]  HBsAg Nonreact      [11-07-20 @ 05:41]  HBcAb Nonreact      [11-07-20 @ 05:41]  HCV 0.07, Nonreact      [11-07-20 @ 05:41]

## 2020-11-23 NOTE — PROGRESS NOTE ADULT - PROBLEM SELECTOR PLAN 1
neurologically at baseline  hemodynamically stable off pressors and inotropic support  bradycardia resolved, now A Flutter on Lopressor 50 mg TID and PO amio load.  continue aspirin for acute graft closure prophylaxis  continue statin for chronic graft patency prophylaxis  Encourage coughing and deep breathing exercises/incentive spirometry   nocturnal BiPAP as tolerated, pt refusing most of night   Ambulate with PT assist as tolerated later today

## 2020-11-23 NOTE — PROGRESS NOTE ADULT - SUBJECTIVE AND OBJECTIVE BOX
Patient in recliner, moaning and complaining to get up.  Spoke with RN and agree patient needs to continue to stay in chair.    REVIEW OF SYSTEMS  Constitutional - No fever,  +fatigue  HEENT - No vertigo, No neck pain  Neurological - No headaches, +memory loss, +loss of strength, +numbness, No tremors  Musculoskeletal - +joint pain, +joint swelling, +muscle pain  Psychiatric - +depression, +anxiety    FUNCTIONAL PROGRESS    Bed Mobility  Bed Mobility Training Sit-to-Supine: moderate assist (50% patient effort);  2 person assist    Sit-Stand Transfer Training  Transfer Training Sit-to-Stand Transfer: moderate assist (50% patient effort)  Transfer Training Stand-to-Sit Transfer: moderate assist (50% patient effort)    Gait Training  Gait Trainin'x2 RW modA one stand by assist  Gait Analysis: unsteadiness c turns, decreased gait velocity and activty tolerance, verbal cues for sequencing, decreased cheyenne step length, difficulties holding onto RW      VITALS  T(C): 36.6 (20 @ 07:00), Max: 36.9 (20 @ 00:51)  HR: 97 (20 @ 10:00) (92 - 100)  BP: 100/61 (20 @ 10:00) (85/50 - 150/93)  RR: 13 (20 @ 10:00) (7 - 33)  SpO2: 100% (20 @ 10:00) (85% - 100%)  Wt(kg): --    MEDICATIONS   aMIOdarone    Tablet     aMIOdarone    Tablet 400 milliGRAM(s) every 8 hours  ascorbic acid 500 milliGRAM(s) daily  aspirin enteric coated 81 milliGRAM(s) daily  atorvastatin 40 milliGRAM(s) at bedtime  benzocaine 15 mG/menthol 3.6 mG (Sugar-Free) Lozenge 1 Lozenge three times a day PRN  chlorhexidine 2% Cloths 1 Application(s) daily  folic acid 1 milliGRAM(s) daily  gabapentin 100 milliGRAM(s) at bedtime  levETIRAcetam 750 milliGRAM(s) two times a day  lidocaine   Patch 1 Patch every 24 hours PRN  melatonin 5 milliGRAM(s) at bedtime  metoprolol tartrate 50 milliGRAM(s) every 6 hours  multivitamin 1 Tablet(s) daily  norepinephrine Infusion 0.05 MICROgram(s)/kG/Min <Continuous>  oxyCODONE    IR 5 milliGRAM(s) every 4 hours PRN  oxyCODONE    IR 10 milliGRAM(s) every 4 hours PRN  pantoprazole    Tablet 40 milliGRAM(s) two times a day  QUEtiapine 25 milliGRAM(s) at bedtime  senna 2 Tablet(s) at bedtime  sodium chloride 0.9%. 1000 milliLiter(s) <Continuous>      RECENT LABS/IMAGING - Reviewed                        9.8    11.32 )-----------( 123      ( 2020 03:07 )             30.8     11    135  |  96<L>  |  36.0<H>  ----------------------------<  96  4.5   |  26.0  |  3.59<H>    Ca    8.6      2020 03:07  Phos  5.1       Mg     2.2           PT/INR - ( 2020 03:07 )   PT: 48.9 sec;   INR: 4.53 ratio                     CTA NECK/BRAIN  - 70% stenosis in the right carotid bulb. Aortic arch dissection extending into the great vessels, as above. Motion artifacts limits evaluation of the left common carotid artery.    CXR  - Retrocardiac opacity, likely atelectasis.    CXR  - Status post open heart surgery. No evidence of active chest pathology. Catheters and/or tubes in place    CXR  - Status post open heart surgery. No evidence of active chest pathology. Catheters and/or tubes in place  ----------------------------------------------------------------------------------------  PHYSICAL EXAM  Constitutional - NAD, Uncomfortable  Extremities - Diffuse edema, BLE PRAFO  Neurologic Exam -                    Cognitive - Awake, Alert, AAO to self, PART of situation     Communication - Fluent, +dysarthria     Motor -  Internally restless - not participatory                    LEFT    UE - ShAB 0/5, EF 0/5, EE 0/5,  3/5                    RIGHT UE - ShAB 1/5, EF 3/5, EE 2/5,  4/5                    LEFT    LE - HF 1/5, KE 1/5, DF 1/5                     RIGHT LE - HF 3/5, KE 4/5, DF 1/5      Sensory - Decreased to the left      Coordination - FTN impaired  Psychiatric - Mood depressed, anxious  ----------------------------------------------------------------------------------------  ASSESSMENT/PLAN  60yMale with functional deficits after debility related to severe MR and AR s/p AVR/MVR   CAD s/p CABGx2, Old CVA - ASA, Lipitor  HTN - Lopressor  AFIB - Amiodarone   Pain - Tylenol, Lidoderm, Oxycodone, Neurontin  Sleep - Melatonin  Mood - Seroquel  Seizures - Keppra  DVT PPX - SCDs  Rehab - Patient making limited progress. Unable to tolerate sitting in chair for more than an hour. Continue to recommend SEAN, patient DOES NOT meet acute inpatient rehabilitation criteria. Patient needs a more prolonged stay to achieve transition to community. Will continue to follow. Functional progress will determine ongoing rehab dispo recommendations, which may change.    Continue bedside therapy as well as OOB throughout the day with mobilization by staff to maintain cardiopulmonary function and prevention of secondary complications related to debility.     Discussed with rehab team.

## 2020-11-23 NOTE — CHART NOTE - NSCHARTNOTEFT_GEN_A_CORE
Source: Patient [x ]  Family [ ]   other [x ] EMR and staff     Current Diet: Diet, Regular:   Consistent Carbohydrate {No Snacks} (CSTCHO)  DASH/TLC {Sodium & Cholesterol Restricted} (DASH)  For patients receiving Renal Replacement - No Protein Restr, No Conc K, No Conc Phos, Low  Sodium (RENAL)  Supplement Feeding Modality:  Oral  Ensure Enlive Cans or Servings Per Day:  3       Frequency:  Three Times a day (11-20-20 @ 01:07)  Diet, NPO after Midnight:      NPO Start Date: 19-Nov-2020,   NPO Start Time: 23:59 (11-19-20 @ 09:15)      Patient reports [ ] nausea  [ ] vomiting [ ] diarrhea [ ] constipation  [ ]chewing problems [ ] swallowing issues  [ x] other:  Decreased appetite     PO intake:  < 50% [x ]   50-75%  [ ]   %  [ ]  other :    Source for PO intake [x ] Patient [ ] family [ x] chart [x ] staff [ ] other    Current Weight:   11/21: 102 kg   11/17: 112 kg  11/15: 114 kg  11/9: 89.5 kg   11/7: 91.8kg     % Weight Change: Unsure of accuracy of wt's secondary to inconsistency, will continue to monitor wt's for trends. (Nonpitting edema to B/L legs)      Pertinent Medications: MEDICATIONS  (STANDING):  aMIOdarone    Tablet   Oral   aMIOdarone    Tablet 400 milliGRAM(s) Oral every 8 hours  ascorbic acid 500 milliGRAM(s) Oral daily  aspirin enteric coated 81 milliGRAM(s) Oral daily  atorvastatin 40 milliGRAM(s) Oral at bedtime  chlorhexidine 2% Cloths 1 Application(s) Topical daily  folic acid 1 milliGRAM(s) Oral daily  gabapentin 100 milliGRAM(s) Oral at bedtime  levETIRAcetam 750 milliGRAM(s) Oral two times a day  melatonin 5 milliGRAM(s) Oral at bedtime  metoprolol tartrate 50 milliGRAM(s) Oral every 6 hours  multivitamin 1 Tablet(s) Oral daily  norepinephrine Infusion 0.05 MICROgram(s)/kG/Min (10.5 mL/Hr) IV Continuous <Continuous>  pantoprazole    Tablet 40 milliGRAM(s) Oral two times a day  QUEtiapine 25 milliGRAM(s) Oral at bedtime  senna 2 Tablet(s) Oral at bedtime  sodium chloride 0.9%. 1000 milliLiter(s) (10 mL/Hr) IV Continuous <Continuous>    MEDICATIONS  (PRN):  benzocaine 15 mG/menthol 3.6 mG (Sugar-Free) Lozenge 1 Lozenge Oral three times a day PRN Sore Throat  lidocaine   Patch 1 Patch Transdermal every 24 hours PRN as needed  oxyCODONE    IR 5 milliGRAM(s) Oral every 4 hours PRN Moderate Pain (4 - 6)  oxyCODONE    IR 10 milliGRAM(s) Oral every 4 hours PRN Severe Pain (7 - 10)    Pertinent Labs: CBC Full  -  ( 23 Nov 2020 03:07 )  WBC Count : 11.32 K/uL  RBC Count : 3.21 M/uL  Hemoglobin : 9.8 g/dL  Hematocrit : 30.8 %  Platelet Count - Automated : 123 K/uL  Mean Cell Volume : 96.0 fl  Mean Cell Hemoglobin : 30.5 pg  Mean Cell Hemoglobin Concentration : 31.8 gm/dL  Auto Neutrophil # : x  Auto Lymphocyte # : x  Auto Monocyte # : x  Auto Eosinophil # : x  Auto Basophil # : x  Auto Neutrophil % : x  Auto Lymphocyte % : x  Auto Monocyte % : x  Auto Eosinophil % : x  Auto Basophil % : x        Skin: Surgical site L leg, Midsternal incision    Nutrition focused physical exam conducted - found signs of malnutrition [ ]absent [ x]present    Subcutaneous fat loss: MIld  [x ] Orbital fat pads region, [x ]Buccal fat region, [ ]Triceps region,  [ ]Ribs region    Muscle wasting: mild  [x ]Temples region, [x ]Clavicle region, [x ]Shoulder region, [ ]Scapula region, [ ]Interosseous region,  [ ]thigh region, [ ]Calf region      Estimated Needs:   [x ] no change since previous assessment  [ ] recalculated:       Current Nutrition Diagnosis:  Pt remains at high nutrition risk secondary to moderate-acute protein calorie malnutrition related to inadequate protein energy intake with increased protein energy needs in setting of healing (s/p reop C2V, AVR/MVR, SANTOS requiring CVVHD-now stopped and transitioned to traditional HD) as evidenced by meeting less then 75% estimated energy requirements > 5 days, fluid accumulation and mild muscle mass and fat loss. Pt continues to have poor appetite and PO intake; continues to refuse meals at times, Pt reports only liking the Chocolate Ensure, further preferences obtained. Encouraged HBV protein foods to promote healing. Recommendations below:       Recommendations:   1. Consider changing MVI to Nephro-jeanie daily   2. Continue with folic acid and Vit. C bernice   3. Encourage/Assistance with meals.   4. Check wt daily to monitor trends  5. Monitor Renal labs        Monitoring and Evaluation:   [ ] PO intake [ x] Tolerance to diet prescription [X] Weights  [X] Follow up per protocol [X] Labs:

## 2020-11-24 LAB
ALBUMIN SERPL ELPH-MCNC: 2.8 G/DL — LOW (ref 3.3–5.2)
ALBUMIN SERPL ELPH-MCNC: 3 G/DL — LOW (ref 3.3–5.2)
ALP SERPL-CCNC: 138 U/L — HIGH (ref 40–120)
ALP SERPL-CCNC: 140 U/L — HIGH (ref 40–120)
ALT FLD-CCNC: 34 U/L — SIGNIFICANT CHANGE UP
ALT FLD-CCNC: 34 U/L — SIGNIFICANT CHANGE UP
ANION GAP SERPL CALC-SCNC: 16 MMOL/L — SIGNIFICANT CHANGE UP (ref 5–17)
APTT BLD: 33.9 SEC — SIGNIFICANT CHANGE UP (ref 27.5–35.5)
AST SERPL-CCNC: 45 U/L — HIGH
AST SERPL-CCNC: 48 U/L — HIGH
BILIRUB DIRECT SERPL-MCNC: 1.3 MG/DL — HIGH (ref 0–0.3)
BILIRUB DIRECT SERPL-MCNC: 1.4 MG/DL — HIGH (ref 0–0.3)
BILIRUB INDIRECT FLD-MCNC: 0.6 MG/DL — SIGNIFICANT CHANGE UP (ref 0.2–1)
BILIRUB INDIRECT FLD-MCNC: 0.7 MG/DL — SIGNIFICANT CHANGE UP (ref 0.2–1)
BILIRUB SERPL-MCNC: 2 MG/DL — SIGNIFICANT CHANGE UP (ref 0.4–2)
BILIRUB SERPL-MCNC: 2 MG/DL — SIGNIFICANT CHANGE UP (ref 0.4–2)
BUN SERPL-MCNC: 22 MG/DL — HIGH (ref 8–20)
BUN SERPL-MCNC: 51 MG/DL — HIGH (ref 8–20)
CALCIUM SERPL-MCNC: 9.1 MG/DL — SIGNIFICANT CHANGE UP (ref 8.6–10.2)
CHLORIDE SERPL-SCNC: 97 MMOL/L — LOW (ref 98–107)
CO2 SERPL-SCNC: 24 MMOL/L — SIGNIFICANT CHANGE UP (ref 22–29)
CREAT SERPL-MCNC: 5.07 MG/DL — HIGH (ref 0.5–1.3)
GLUCOSE SERPL-MCNC: 108 MG/DL — HIGH (ref 70–99)
HCT VFR BLD CALC: 29.7 % — LOW (ref 39–50)
HGB BLD-MCNC: 9.5 G/DL — LOW (ref 13–17)
INR BLD: 1.69 RATIO — HIGH (ref 0.88–1.16)
INR BLD: 1.7 RATIO — HIGH (ref 0.88–1.16)
MAGNESIUM SERPL-MCNC: 2.3 MG/DL — SIGNIFICANT CHANGE UP (ref 1.6–2.6)
MCHC RBC-ENTMCNC: 30.6 PG — SIGNIFICANT CHANGE UP (ref 27–34)
MCHC RBC-ENTMCNC: 32 GM/DL — SIGNIFICANT CHANGE UP (ref 32–36)
MCV RBC AUTO: 95.8 FL — SIGNIFICANT CHANGE UP (ref 80–100)
PLATELET # BLD AUTO: 160 K/UL — SIGNIFICANT CHANGE UP (ref 150–400)
POTASSIUM SERPL-MCNC: 5 MMOL/L — SIGNIFICANT CHANGE UP (ref 3.5–5.3)
POTASSIUM SERPL-SCNC: 5 MMOL/L — SIGNIFICANT CHANGE UP (ref 3.5–5.3)
PROT SERPL-MCNC: 5.8 G/DL — LOW (ref 6.6–8.7)
PROT SERPL-MCNC: 5.8 G/DL — LOW (ref 6.6–8.7)
PROTHROM AB SERPL-ACNC: 19.1 SEC — HIGH (ref 10.6–13.6)
PROTHROM AB SERPL-ACNC: 19.2 SEC — HIGH (ref 10.6–13.6)
RBC # BLD: 3.1 M/UL — LOW (ref 4.2–5.8)
RBC # FLD: 15.2 % — HIGH (ref 10.3–14.5)
SODIUM SERPL-SCNC: 137 MMOL/L — SIGNIFICANT CHANGE UP (ref 135–145)
SRA INTERP SER-IMP: SIGNIFICANT CHANGE UP
WBC # BLD: 15.05 K/UL — HIGH (ref 3.8–10.5)
WBC # FLD AUTO: 15.05 K/UL — HIGH (ref 3.8–10.5)

## 2020-11-24 PROCEDURE — 99233 SBSQ HOSP IP/OBS HIGH 50: CPT

## 2020-11-24 PROCEDURE — 90937 HEMODIALYSIS REPEATED EVAL: CPT

## 2020-11-24 RX ORDER — METHYLPHENIDATE HCL 5 MG
5 TABLET ORAL ONCE
Refills: 0 | Status: DISCONTINUED | OUTPATIENT
Start: 2020-11-24 | End: 2020-11-24

## 2020-11-24 RX ORDER — METOCLOPRAMIDE HCL 10 MG
10 TABLET ORAL EVERY 8 HOURS
Refills: 0 | Status: COMPLETED | OUTPATIENT
Start: 2020-11-24 | End: 2020-11-24

## 2020-11-24 RX ORDER — ONDANSETRON 8 MG/1
4 TABLET, FILM COATED ORAL EVERY 6 HOURS
Refills: 0 | Status: DISCONTINUED | OUTPATIENT
Start: 2020-11-24 | End: 2020-11-26

## 2020-11-24 RX ORDER — WARFARIN SODIUM 2.5 MG/1
2.5 TABLET ORAL ONCE
Refills: 0 | Status: COMPLETED | OUTPATIENT
Start: 2020-11-24 | End: 2020-11-24

## 2020-11-24 RX ORDER — METHYLPHENIDATE HCL 5 MG
5 TABLET ORAL
Refills: 0 | Status: DISCONTINUED | OUTPATIENT
Start: 2020-11-25 | End: 2020-11-30

## 2020-11-24 RX ADMIN — PANTOPRAZOLE SODIUM 40 MILLIGRAM(S): 20 TABLET, DELAYED RELEASE ORAL at 17:14

## 2020-11-24 RX ADMIN — Medication 104 MILLIGRAM(S): at 15:35

## 2020-11-24 RX ADMIN — Medication 81 MILLIGRAM(S): at 11:16

## 2020-11-24 RX ADMIN — Medication 104 MILLIGRAM(S): at 21:51

## 2020-11-24 RX ADMIN — Medication 50 MILLIGRAM(S): at 11:20

## 2020-11-24 RX ADMIN — Medication 1 TABLET(S): at 11:21

## 2020-11-24 RX ADMIN — Medication 500 MILLIGRAM(S): at 11:16

## 2020-11-24 RX ADMIN — Medication 200 MILLIGRAM(S): at 09:21

## 2020-11-24 RX ADMIN — GABAPENTIN 100 MILLIGRAM(S): 400 CAPSULE ORAL at 21:17

## 2020-11-24 RX ADMIN — Medication 50 MILLIGRAM(S): at 06:53

## 2020-11-24 RX ADMIN — Medication 104 MILLIGRAM(S): at 09:24

## 2020-11-24 RX ADMIN — Medication 5 MILLIGRAM(S): at 08:43

## 2020-11-24 RX ADMIN — Medication 5 MILLIGRAM(S): at 21:18

## 2020-11-24 RX ADMIN — WARFARIN SODIUM 2.5 MILLIGRAM(S): 2.5 TABLET ORAL at 23:03

## 2020-11-24 RX ADMIN — PANTOPRAZOLE SODIUM 40 MILLIGRAM(S): 20 TABLET, DELAYED RELEASE ORAL at 06:57

## 2020-11-24 RX ADMIN — ATORVASTATIN CALCIUM 40 MILLIGRAM(S): 80 TABLET, FILM COATED ORAL at 21:19

## 2020-11-24 RX ADMIN — LEVETIRACETAM 750 MILLIGRAM(S): 250 TABLET, FILM COATED ORAL at 17:14

## 2020-11-24 RX ADMIN — SENNA PLUS 2 TABLET(S): 8.6 TABLET ORAL at 21:19

## 2020-11-24 RX ADMIN — LEVETIRACETAM 750 MILLIGRAM(S): 250 TABLET, FILM COATED ORAL at 06:53

## 2020-11-24 RX ADMIN — CHLORHEXIDINE GLUCONATE 1 APPLICATION(S): 213 SOLUTION TOPICAL at 11:17

## 2020-11-24 RX ADMIN — Medication 1 MILLIGRAM(S): at 11:16

## 2020-11-24 RX ADMIN — QUETIAPINE FUMARATE 25 MILLIGRAM(S): 200 TABLET, FILM COATED ORAL at 21:18

## 2020-11-24 RX ADMIN — AMIODARONE HYDROCHLORIDE 400 MILLIGRAM(S): 400 TABLET ORAL at 14:23

## 2020-11-24 RX ADMIN — ONDANSETRON 4 MILLIGRAM(S): 8 TABLET, FILM COATED ORAL at 06:10

## 2020-11-24 RX ADMIN — AMIODARONE HYDROCHLORIDE 400 MILLIGRAM(S): 400 TABLET ORAL at 06:53

## 2020-11-24 RX ADMIN — Medication 5 MILLIGRAM(S): at 12:51

## 2020-11-24 RX ADMIN — Medication 50 MILLIGRAM(S): at 23:02

## 2020-11-24 NOTE — PROGRESS NOTE ADULT - SUBJECTIVE AND OBJECTIVE BOX
[facility]                                                    [address] , [address], [place] [address]                                                         PHONE: [phone]    FAX: [phone] [address], [address], [place] [address]                                                 PHONE: [phone]    FAX: [phone]  *******************************************************************************  cc: post op    HPI:  60y Male with past medical history significant for re-op CABGx2 (SVG->OM/RPDA), bioAVR & bioMVR 11/13/20, post-op junctional rhythm resolved, now AT/AFlutter 100s, LBBB, ARF/CRI now on HD (s/p permacath 11/20/20), anemia s/p PRBCs, volume overload improving, h/o CAD s/p stent LAD, normal LV fxn, type A dissection repair 2010, known chronic type B dissection, chronic diastolic CHF, CVA w/ L-sided sensory loss, DM, HTN, HL, psoriasis on Humira, likely COVID in 9/20 (+ Ab), prior cocaine/opioid/benzo/EtoH abuse      Overnight events/Subjective Assessment: no new complaints    INTERPRETATION OF TELEMETRY (personally reviewed): SR    PAST MEDICAL & SURGICAL HISTORY:  CVA (cerebral vascular accident)    CHF (congestive heart failure)    H/O colectomy    Aorta disorder        penicillin (Unknown)      MEDICATIONS  (STANDING):  aMIOdarone    Tablet   Oral   aMIOdarone    Tablet 400 milliGRAM(s) Oral every 8 hours  aMIOdarone    Tablet 200 milliGRAM(s) Oral daily  ascorbic acid 500 milliGRAM(s) Oral daily  aspirin enteric coated 81 milliGRAM(s) Oral daily  atorvastatin 40 milliGRAM(s) Oral at bedtime  chlorhexidine 2% Cloths 1 Application(s) Topical daily  folic acid 1 milliGRAM(s) Oral daily  gabapentin 100 milliGRAM(s) Oral at bedtime  levETIRAcetam 750 milliGRAM(s) Oral two times a day  melatonin 5 milliGRAM(s) Oral at bedtime  methylphenidate 5 milliGRAM(s) Oral once  metoclopramide  IVPB 10 milliGRAM(s) IV Intermittent every 8 hours  metoprolol tartrate 50 milliGRAM(s) Oral every 6 hours  multivitamin 1 Tablet(s) Oral daily  pantoprazole    Tablet 40 milliGRAM(s) Oral two times a day  QUEtiapine 25 milliGRAM(s) Oral at bedtime  saline laxative (FLEET) Rectal Enema 1 Enema Rectal once  senna 2 Tablet(s) Oral at bedtime  sodium chloride 0.9%. 1000 milliLiter(s) (10 mL/Hr) IV Continuous <Continuous>  testosterone cypionate Injectable 200 milliGRAM(s) IntraMuscular once    MEDICATIONS  (PRN):  benzocaine 15 mG/menthol 3.6 mG (Sugar-Free) Lozenge 1 Lozenge Oral three times a day PRN Sore Throat  bisacodyl Suppository 10 milliGRAM(s) Rectal daily PRN Constipation  lidocaine   Patch 1 Patch Transdermal every 24 hours PRN as needed  ondansetron Injectable 4 milliGRAM(s) IV Push every 6 hours PRN Nausea and/or Vomiting      Vital Signs Last 24 Hrs  T(C): 36.8 (24 Nov 2020 07:27), Max: 37.2 (24 Nov 2020 00:23)  T(F): 98.3 (24 Nov 2020 07:27), Max: 98.9 (24 Nov 2020 00:23)  HR: 85 (24 Nov 2020 08:00) (85 - 97)  BP: 106/59 (24 Nov 2020 08:00) (96/55 - 149/68)  BP(mean): 78 (24 Nov 2020 08:00) (68 - 94)  RR: 14 (24 Nov 2020 08:00) (9 - 33)  SpO2: 97% (24 Nov 2020 08:39) (91% - 100%)    I&O's Detail    23 Nov 2020 07:01  -  24 Nov 2020 07:00  --------------------------------------------------------  IN:    IV PiggyBack: 100 mL    Oral Fluid: 240 mL  Total IN: 340 mL    OUT:    Chest Tube (mL): 20 mL    Chest Tube (mL): 20 mL    Chest Tube (mL): 105 mL  Total OUT: 145 mL    Total NET: 195 mL      24 Nov 2020 07:01  -  24 Nov 2020 08:55  --------------------------------------------------------  IN:  Total IN: 0 mL    OUT:    Chest Tube (mL): 35 mL  Total OUT: 35 mL    Total NET: -35 mL        I&O's Summary    23 Nov 2020 07:01 - 24 Nov 2020 07:00  --------------------------------------------------------  IN: 340 mL / OUT: 145 mL / NET: 195 mL    24 Nov 2020 07:01  -  24 Nov 2020 08:55  --------------------------------------------------------  IN: 0 mL / OUT: 35 mL / NET: -35 mL            PHYSICAL EXAM:  General: Appears well developed, well nourished, no acute distress. not in acute pain. one chest tube remaining  HEAD: normal cephalic. Atraumatic  PUPILS: equal and reactive to light  EARS: normal hearing  NECK: supple. no JVD or HJR. no carotid bruits. no visible lymphadenopathy  NOSE: no gross abnormalities  CHEST: symmetric chest wall expansion  CARDIOVASCULAR: Normal rate. Regular rhythm. Normal S1 and S2, no S3/S4,  no murmur, rub, or gallop  LUNGS: Normal effort. Normal respiratory rate. Breath sounds are clear to auscultation bilaterally. No respiratory distress. No stridor.  no rales, rhonchi or wheeze. no decreased Breath sounds  ABDOMEN: Soft, nontender, non-distended, positive bowel sounds, no mass or bruit. no abdominal tenderness. No rebound. no ascites  EXTREMITIES: No clubbing, cyanosis or edema. normal range of motion  PULSES:  distal pulses WNL  SKIN: Warm and dry with normal turgor. no visible rash or cyanosis   NEURO: Alert & oriented x 3, grossly intact with no focal weakness  PSYCH: normal mood and affect. Grossly normal insight and judgement exhibited    FAMILY HISTORY:  FH: hypertension  No family hx of ischemic heart disease mother, father or 1st degree relatives      SOCIAL HISTORY:  no active smoking. No ETOH/No IVDA    REVIEW OF SYSTEMS:  Constitutional: no fever, chills or malaise. No weight loss  Head: no trauma  Eyes: no visual deficit. No double vision  Ears: no hearing deficit or ringing in the ears  Nose: no nose bleeds or smell changes or congestion  Throat: no difficult swallowing or painful swallowing  Neck: supple. No lymphadenopathy or swelling  Respiratory: no SOB, wheeze, asthma, COPD. No cough. No blood in the sputum  Cardiovascular: no CP, palpitations, irregular heart beats. No edema. No PND. No orthopnea. No skin/temperature or color changes  Gastrointestinal: no abdominal pain. No constipation. No diarrhea. No melena. No nausea. No vomiting. No bloating  Genitourinary: no frequency or urgency. No hematuria  Lymphatics: no grossly swollen lymph nodes  Musculoskeletal: no limitation of range of motion. Normal strength. No pain  Integumentary: no visible rash. No itching  Neurologic: no HA. No TIA or stroke symptoms. No seizure. No hx of epilepsy. No tingling or numbness. No weakness. No dizziness  Psychiatric: denied. Reports appropriate mood.        LABS:                        9.5    15.05 )-----------( 160      ( 24 Nov 2020 02:55 )             29.7     11-24    137  |  97<L>  |  51.0<H>  ----------------------------<  108<H>  5.0   |  24.0  |  5.07<H>    Ca    9.1      24 Nov 2020 02:55  Phos  5.1     11-23  Mg     2.3     11-24    TPro  5.8<L>  /  Alb  3.0<L>  /  TBili  2.0  /  DBili  1.4<H>  /  AST  45<H>  /  ALT  34  /  AlkPhos  138<H>  11-24        PT/INR - ( 24 Nov 2020 02:55 )   PT: 19.2 sec;   INR: 1.70 ratio         PTT - ( 24 Nov 2020 02:55 )  PTT:33.9 sec  serum  Lipids:     Thyroid Stimulating Hormone, Serum: 1.95 uIU/mL (11-03 @ 08:18)      RADIOLOGY & ADDITIONAL STUDIES:    < from: Xray Chest 1 View- PORTABLE-Urgent (Xray Chest 1 View- PORTABLE-Urgent .) (11.23.20 @ 13:27) >  INTERPRETATION:  AP semierect chest on November 23, 2020 at 12:46 PM.    Heart enlargement, sternotomy, prosthetic heart valves, large-caliber double-lumen right jugular line, and left chest tube remains. A second left chest tube seen on November 22 has been removed.    No pneumothorax is evident.    IMPRESSION: Uneventful removal of one of 2 left chest tubes.    < end of copied text >      ASSESSMENT AND PLAN:  In summary, JONATHAN GIBSON is a 60y Male with past medical history significant for re-op CABGx2 (SVG->OM/RPDA), bioAVR & bioMVR 11/13/20, post-op junctional rhythm resolved, now AT/AFlutter 100s, LBBB, ARF/CRI now on HD (s/p permacath 11/20/20), anemia s/p PRBCs, volume overload improving, h/o CAD s/p stent LAD, normal LV fxn, type A dissection repair 2010, known chronic type B dissection, chronic diastolic CHF, CVA w/ L-sided sensory loss, DM, HTN, HL, psoriasis on Humira, likely COVID in 9/20 (+ Ab), prior cocaine/opioid/benzo/EtoH abuse    - Patient improving clinically post-operatively, s/p reop AVR 25mm CE [id number], MVR 31mm Magna Ease, CABGx2 (DGV-OM, RPDA) with Dr. Butler 11/13/20.  - Cardiac cath 11/10/20: severe 2 vessel CAD.  Chronically occluded proximal RCA with collaterals from the LCA.  EF 50%.  Elevated LVEDP.  - Echocardiogram 11/4/20 EF 50-55%, grade II diastolic dysfunction, mildly reduced RV systolic function, severe LAE, mild KOLTON, mod-severe MR and AI, mod TR, dilated aortic root @ 4.2 cm  - Junctional rhythm post-operatively resolved, no events overnight, LBBB present.  No indication for a PPM at this time.  Patient was evaluated by Dr. Raygoza (EP).  Amiodarone load in place with 400 tid x12 doses then 200 daily.  Agree with uptitration of Lopressor to 50 q8h.  Rhythm now AT with HR 90-100s, BP stable.  Monitor vitals closely and titrate meds PRN.  Should he develop TBS, a PPM will be indicated.  - ASA 81 daily in place  - Lipitor 40 daily in place  - ARF/CRI now on HD, s/p permacath.  Nephrology follow-up. appears euvolemic  - Patient with AFlutter with increased [id number] risk score currently maintained on oral AC with Coumadin.  Dosing per primary team.  Goal INR 2-3.  - DM.  Glycemic control per primary team.  - Valvular heart disease. s/p reoperative AVR and now MVR. SBE prophylaxis for procedures that entail bacteremia.  - Seizure disorder. Keppra in place.  - Low platelets, improving.  No evidence of active bleeding. Further management as per CTS.  - Pain management as per CTS  - one CT remaining. Removal as per CTS  - d/w nursing at the bedside      Katie Sin MD

## 2020-11-24 NOTE — PROGRESS NOTE ADULT - ASSESSMENT
Martin on CKD; requiring HD  CABG s/p AVR/MVR  Hypotension    Hd today  sodium modelling/ low dialysate temp  Monitor lytes, BP

## 2020-11-24 NOTE — PROGRESS NOTE ADULT - SUBJECTIVE AND OBJECTIVE BOX
[facility] DIVISION OF KIDNEY DISEASES AND HYPERTENSION -- FOLLOW UP NOTE  --------------------------------------------------------------------------------  Chief Complaint: ESRD on HD    24 hour events/subjective:  seen during HD; tolerating well        PAST HISTORY  --------------------------------------------------------------------------------  No significant changes to PMH, PSH, FHx, SHx, unless otherwise noted    ALLERGIES & MEDICATIONS  --------------------------------------------------------------------------------  Allergies    penicillin (Unknown)    Intolerances      Standing Inpatient Medications  aMIOdarone    Tablet   Oral   aMIOdarone    Tablet 200 milliGRAM(s) Oral daily  ascorbic acid 500 milliGRAM(s) Oral daily  aspirin enteric coated 81 milliGRAM(s) Oral daily  atorvastatin 40 milliGRAM(s) Oral at bedtime  chlorhexidine 2% Cloths 1 Application(s) Topical daily  folic acid 1 milliGRAM(s) Oral daily  gabapentin 100 milliGRAM(s) Oral at bedtime  levETIRAcetam 750 milliGRAM(s) Oral two times a day  melatonin 5 milliGRAM(s) Oral at bedtime  metoclopramide  IVPB 10 milliGRAM(s) IV Intermittent every 8 hours  metoprolol tartrate 50 milliGRAM(s) Oral every 6 hours  multivitamin 1 Tablet(s) Oral daily  pantoprazole    Tablet 40 milliGRAM(s) Oral two times a day  QUEtiapine 25 milliGRAM(s) Oral at bedtime  senna 2 Tablet(s) Oral at bedtime  sodium chloride 0.9%. 1000 milliLiter(s) IV Continuous <Continuous>    PRN Inpatient Medications  benzocaine 15 mG/menthol 3.6 mG (Sugar-Free) Lozenge 1 Lozenge Oral three times a day PRN  bisacodyl Suppository 10 milliGRAM(s) Rectal daily PRN  lidocaine   Patch 1 Patch Transdermal every 24 hours PRN  ondansetron Injectable 4 milliGRAM(s) IV Push every 6 hours PRN      REVIEW OF SYSTEMS  --------------------------------------------------------------------------------  unable to obtain    VITALS/PHYSICAL EXAM  --------------------------------------------------------------------------------  T(C): 37.1 (11-24-20 @ 13:45), Max: 37.2 (11-24-20 @ 00:23)  HR: 94 (11-24-20 @ 13:45) (85 - 97)  BP: 129/68 (11-24-20 @ 13:45) (96/53 - 149/68)  RR: 14 (11-24-20 @ 13:45) (9 - 32)  SpO2: 97% (11-24-20 @ 13:45) (91% - 99%)  Wt(kg): --        11-23-20 @ 07:01  -  11-24-20 @ 07:00  --------------------------------------------------------  IN: 340 mL / OUT: 145 mL / NET: 195 mL    11-24-20 @ 07:01  -  11-24-20 @ 15:21  --------------------------------------------------------  IN: 100 mL / OUT: 75 mL / NET: 25 mL      Physical Exam:  	Gen: NAD  	HEENT: PERRL, supple neck,  	Pulm: CTA B/L  	CV: RRR, S1S2; no rub  	Abd: +BS, soft, nontender  	UE: Warm, intact strength; no asterixis  	LE: Warm, + edema  	Neuro: No focal deficits  	Psych: Normal affect and mood  	Skin: Warm, without rashes  	Vascular access: tunneled HD catheter      LABS/STUDIES  --------------------------------------------------------------------------------              9.5    15.05 >-----------<  160      [11-24-20 @ 02:55]              29.7     x   |  x   |  22.0  ----------------------------<  x       [11-24-20 @ 13:02]  x    |  x   |  x         Ca     9.1     [11-24-20 @ 02:55]      Mg     2.3     [11-24-20 @ 02:55]      Phos  5.1     [11-23-20 @ 03:07]    TPro  5.8  /  Alb  2.8  /  TBili  2.0  /  DBili  1.3  /  AST  48  /  ALT  34  /  AlkPhos  140  [11-24-20 @ 13:03]    PT/INR: PT 19.1 , INR 1.69       [11-24-20 @ 13:03]  PTT: 33.9       [11-24-20 @ 02:55]      Creatinine Trend:  SCr 5.07 [11-24 @ 02:55]  SCr 3.59 [11-23 @ 03:07]  SCr 3.58 [11-22 @ 03:15]  SCr 3.87 [11-21 @ 04:08]  SCr 3.65 [11-20 @ 22:24]        Iron 29, TIBC 332, %sat 9      [11-03-20 @ 08:18]  Ferritin 230      [11-03-20 @ 08:18]  TSH 1.95      [11-03-20 @ 08:18]  Lipid: chol --, , HDL --, LDL --      [11-15-20 @ 02:40]    HBsAb <3.0      [11-07-20 @ 05:41]  HBsAg Nonreact      [11-07-20 @ 05:41]  HBcAb Nonreact      [11-07-20 @ 05:41]  HCV 0.07, Nonreact      [11-07-20 @ 05:41]

## 2020-11-24 NOTE — PROGRESS NOTE ADULT - SUBJECTIVE AND OBJECTIVE BOX
Subjective:  61yo M not tolerating bipap again this evening.      HPI:  Patient is a 60 yoM with PMH of CAD s/p stent in LAD, leaky aortic valve, uncontrolled bp, thoracic aortic dissection requiring emergent surgery, CVA with left sided sensory loss, colostomy for bowel ischemia s/p reversal, psoriasis on Humira injections presents from home with sob and le edema. Patient states the past several weeks, he has not been taking his medications because he forgot. Patient states the sob has gotten worse over the past few days and also associated with chest pressure. Patient also hasnt seen his cardiologist in several months. Patient worked up in the er and had an elevated bnp and htn urgency and eric. Patient seen at bedside and states feeling a little better.  (02 Nov 2020 14:32)          PAST MEDICAL & SURGICAL HISTORY:  CVA (cerebral vascular accident)    CHF (congestive heart failure)    H/O colectomy    Aorta disorder            MEDICATIONS  (STANDING):  aMIOdarone    Tablet   Oral   aMIOdarone    Tablet 400 milliGRAM(s) Oral every 8 hours  aMIOdarone    Tablet 200 milliGRAM(s) Oral daily  ascorbic acid 500 milliGRAM(s) Oral daily  aspirin enteric coated 81 milliGRAM(s) Oral daily  atorvastatin 40 milliGRAM(s) Oral at bedtime  chlorhexidine 2% Cloths 1 Application(s) Topical daily  folic acid 1 milliGRAM(s) Oral daily  gabapentin 100 milliGRAM(s) Oral at bedtime  levETIRAcetam 750 milliGRAM(s) Oral two times a day  melatonin 5 milliGRAM(s) Oral at bedtime  metoprolol tartrate 50 milliGRAM(s) Oral every 6 hours  multivitamin 1 Tablet(s) Oral daily  pantoprazole    Tablet 40 milliGRAM(s) Oral two times a day  QUEtiapine 25 milliGRAM(s) Oral at bedtime  senna 2 Tablet(s) Oral at bedtime  sodium chloride 0.9%. 1000 milliLiter(s) (10 mL/Hr) IV Continuous <Continuous>    MEDICATIONS  (PRN):  benzocaine 15 mG/menthol 3.6 mG (Sugar-Free) Lozenge 1 Lozenge Oral three times a day PRN Sore Throat  bisacodyl Suppository 10 milliGRAM(s) Rectal daily PRN Constipation  lidocaine   Patch 1 Patch Transdermal every 24 hours PRN as needed          Allergies    penicillin (Unknown)    Intolerances          WEIGHTS:  Daily     Daily   Admit Wt: Drug Dosing Weight  Height (cm): 165.1 (02 Nov 2020 08:55)  Weight (kg): 112 (17 Nov 2020 12:00)  BMI (kg/m2): 41.1 (17 Nov 2020 12:00)  BSA (m2): 2.16 (17 Nov 2020 12:00)  I&O's Summary    22 Nov 2020 07:01  -  23 Nov 2020 07:00  --------------------------------------------------------  IN: 325 mL / OUT: 1880 mL / NET: -1555 mL    23 Nov 2020 07:01  -  24 Nov 2020 04:01  --------------------------------------------------------  IN: 340 mL / OUT: 135 mL / NET: 205 mL        VITAL SIGNS:  ICU Vital Signs Last 24 Hrs  T(C): 37.2 (24 Nov 2020 00:23), Max: 37.2 (24 Nov 2020 00:23)  T(F): 98.9 (24 Nov 2020 00:23), Max: 98.9 (24 Nov 2020 00:23)  HR: 88 (24 Nov 2020 02:00) (88 - 99)  BP: 106/69 (24 Nov 2020 02:00) (85/50 - 149/68)  BP(mean): 83 (24 Nov 2020 02:00) (62 - 96)  ABP: --  ABP(mean): --  RR: 11 (24 Nov 2020 02:00) (7 - 33)  SpO2: 98% (24 Nov 2020 02:00) (85% - 100%)        All laboratory results, radiology and medications reviewed.    LABS:  11-24    137  |  97<L>  |  51.0<H>  ----------------------------<  108<H>  5.0   |  24.0  |  5.07<H>    Ca    9.1      24 Nov 2020 02:55  Phos  5.1     11-23  Mg     2.3     11-24    TPro  6.2<L>  /  Alb  3.1<L>  /  TBili  2.2<H>  /  DBili  1.5<H>  /  AST  47<H>  /  ALT  35  /  AlkPhos  145<H>  11-23                                 9.5    15.05 )-----------( 160      ( 24 Nov 2020 02:55 )             29.7          PT/INR - ( 24 Nov 2020 02:55 )   PT: 19.2 sec;   INR: 1.70 ratio         PTT - ( 24 Nov 2020 02:55 )  PTT:33.9 sec  Bilirubin Total, Serum: 2.2 mg/dL (11-23 @ 18:30)  Bilirubin Direct, Serum: 1.5 mg/dL (11-23 @ 18:30)    ABG - ( 23 Nov 2020 04:30 )  pH, Arterial: 7.36  pH, Blood: x     /  pCO2: 51    /  pO2: 117   / HCO3: 27    / Base Excess: 2.8   /  SaO2: 99                    CAPILLARY BLOOD GLUCOSE                 PHYSICAL EXAM:  General:  well nourished, no acute distress  Neurology:  alert and oriented X 4, nonfocal, no gross deficits  Respiratory:  clear to auscultation bilaterally  CV:  A Fib, rate controlled  Abdomen:  soft, nontender, distended, hypoactive bowel sounds  Extremities:  warm, well perfused, trace edema +DP pulses  Incisions:  midline sternal incision, c/d/i, sternum stable

## 2020-11-24 NOTE — PROGRESS NOTE ADULT - SUBJECTIVE AND OBJECTIVE BOX
Patient gomez this morning, moaning.  About to have HD placed.   Spoke to PT about progress and current recommendations.    REVIEW OF SYSTEMS  Constitutional - No fever,  No fatigue  HEENT - No vertigo, No neck pain  Neurological - No headaches, No memory loss, No loss of strength, No numbness, No tremors  Musculoskeletal - No joint pain, No joint swelling, No muscle pain  Psychiatric - No depression, No anxiety    FUNCTIONAL PROGRESS    Bed Mobility  Bed Mobility Training Sit-to-Supine: modAx2  Bed Mobility Training Supine-to-Sit: modAx2     Sit-Stand Transfer Training  Transfer Training Sit-to-Stand Transfer: modAx2  Transfer Training Stand-to-Sit Transfer: modAx2     Gait Training  Gait Trainin feet RW modAx2   Gait Analysis: decreased gait velocity and activity tolerance, assist for steadying, decreased cheyenne step length and activity tolerance       VITALS  T(C): 36.8 (20 @ 12:19), Max: 37.2 (20 @ 00:23)  HR: 96 (20 @ 11:00) (85 - 97)  BP: 126/73 (20 @ 11:00) (96/53 - 149/68)  RR: 19 (20 @ 11:00) (9 - 32)  SpO2: 97% (20 @ 11:00) (91% - 99%)  Wt(kg): --    MEDICATIONS   aMIOdarone    Tablet     aMIOdarone    Tablet 400 milliGRAM(s) every 8 hours  aMIOdarone    Tablet 200 milliGRAM(s) daily  ascorbic acid 500 milliGRAM(s) daily  aspirin enteric coated 81 milliGRAM(s) daily  atorvastatin 40 milliGRAM(s) at bedtime  benzocaine 15 mG/menthol 3.6 mG (Sugar-Free) Lozenge 1 Lozenge three times a day PRN  bisacodyl Suppository 10 milliGRAM(s) daily PRN  chlorhexidine 2% Cloths 1 Application(s) daily  folic acid 1 milliGRAM(s) daily  gabapentin 100 milliGRAM(s) at bedtime  levETIRAcetam 750 milliGRAM(s) two times a day  lidocaine   Patch 1 Patch every 24 hours PRN  melatonin 5 milliGRAM(s) at bedtime  methylphenidate 5 milliGRAM(s) once  metoclopramide  IVPB 10 milliGRAM(s) every 8 hours  metoprolol tartrate 50 milliGRAM(s) every 6 hours  multivitamin 1 Tablet(s) daily  ondansetron Injectable 4 milliGRAM(s) every 6 hours PRN  pantoprazole    Tablet 40 milliGRAM(s) two times a day  QUEtiapine 25 milliGRAM(s) at bedtime  saline laxative (FLEET) Rectal Enema 1 Enema once  senna 2 Tablet(s) at bedtime  sodium chloride 0.9%. 1000 milliLiter(s) <Continuous>      RECENT LABS/IMAGING - Reviewed                        9.5    15.05 )-----------( 160      ( 2020 02:55 )             29.7         137  |  97<L>  |  51.0<H>  ----------------------------<  108<H>  5.0   |  24.0  |  5.07<H>    Ca    9.1      2020 02:55  Phos  5.1       Mg     2.3         TPro  5.8<L>  /  Alb  3.0<L>  /  TBili  2.0  /  DBili  1.4<H>  /  AST  45<H>  /  ALT  34  /  AlkPhos  138<H>      PT/INR - ( 2020 02:55 )   PT: 19.2 sec;   INR: 1.70 ratio         PTT - ( 2020 02:55 )  PTT:33.9 sec            CTA NECK/BRAIN  - 70% stenosis in the right carotid bulb. Aortic arch dissection extending into the great vessels, as above. Motion artifacts limits evaluation of the left common carotid artery.    CXR  - Retrocardiac opacity, likely atelectasis.    CXR  - Status post open heart surgery. No evidence of active chest pathology. Catheters and/or tubes in place    ABD XR  - Diffuse air-filled bowel ileus. Follow-up upright or lateral decubitus KUB recommended to evaluate for static versus differential air-fluid levels    TTE  - Summary:   1. Left ventricular ejection fraction, by visual estimation, is 40%%.   2. Technically adequate study.   3. Moderately decreased global left ventricular systolic function.   4. Multiple left ventricular regional wall motion abnormalities exist. See wall motion findings.   5. The left ventricular diastolic function could not be assessed in this study.   6. Moderately reduced RV systolic function.   7. Trivial pericardial effusion.   8. A bioprosthetic valve is present in the mitral position.   9. Trace mitral valve regurgitation.  10. Moderate tricuspid regurgitation.  11. Bioprosthesis in the aortic position.  12. Estimated pulmonary artery systolic pressure is 40.9 mmHg assuming a right atrial pressure of 8 mmHg, which is consistent with mild pulmonary hypertension.  13. Endocardial visualization was enhanced with intravenous echo contrast.  14. Mitral valve mean gradient is 2.2 mmHg consistent with mild mitral stenosis.  ----------------------------------------------------------------------------------------  PHYSICAL EXAM  Constitutional - NAD, Uncomfortable  Extremities - Diffuse edema   Neurologic Exam -                    Cognitive - Awake, Alert, AAO to self, PART of situation     Communication - Fluent, +dysarthria     Motor -  Unable to fully assess - limited by participation     Sensory - Decreased to the left      Coordination - FTN impaired  Psychiatric - Mood depressed, anxious  ----------------------------------------------------------------------------------------  ASSESSMENT/PLAN  60yMale with functional deficits after debility related to severe MR and AR s/p AVR/MVR   CAD s/p CABGx2, Old CVA - ASA, Lipitor  HTN - Lopressor  AFIB - Amiodarone   Ileus - Reglan  Pain - Tylenol, Lidoderm, Neurontin  Sleep - Melatonin  Mood - Seroquel  Seizures - Keppra  DVT PPX - SCDs  Rehab - Medically being optimized. Patient making limited progress. Discussed with PT and agree with SEAN. Discussed with rehab team.

## 2020-11-25 DIAGNOSIS — I48.92 UNSPECIFIED ATRIAL FLUTTER: ICD-10-CM

## 2020-11-25 LAB
ANION GAP SERPL CALC-SCNC: 17 MMOL/L — SIGNIFICANT CHANGE UP (ref 5–17)
ANION GAP SERPL CALC-SCNC: 18 MMOL/L — HIGH (ref 5–17)
BUN SERPL-MCNC: 42 MG/DL — HIGH (ref 8–20)
BUN SERPL-MCNC: 54 MG/DL — HIGH (ref 8–20)
CALCIUM SERPL-MCNC: 8.4 MG/DL — LOW (ref 8.6–10.2)
CALCIUM SERPL-MCNC: 8.5 MG/DL — LOW (ref 8.6–10.2)
CHLORIDE SERPL-SCNC: 95 MMOL/L — LOW (ref 98–107)
CHLORIDE SERPL-SCNC: 97 MMOL/L — LOW (ref 98–107)
CO2 SERPL-SCNC: 23 MMOL/L — SIGNIFICANT CHANGE UP (ref 22–29)
CO2 SERPL-SCNC: 24 MMOL/L — SIGNIFICANT CHANGE UP (ref 22–29)
CREAT SERPL-MCNC: 4.36 MG/DL — HIGH (ref 0.5–1.3)
CREAT SERPL-MCNC: 5.14 MG/DL — HIGH (ref 0.5–1.3)
GAS PNL BLDA: SIGNIFICANT CHANGE UP
GLUCOSE SERPL-MCNC: 99 MG/DL — SIGNIFICANT CHANGE UP (ref 70–99)
GLUCOSE SERPL-MCNC: 99 MG/DL — SIGNIFICANT CHANGE UP (ref 70–99)
HCT VFR BLD CALC: 29.3 % — LOW (ref 39–50)
HGB BLD-MCNC: 9.3 G/DL — LOW (ref 13–17)
INR BLD: 1.81 RATIO — HIGH (ref 0.88–1.16)
MAGNESIUM SERPL-MCNC: 2.2 MG/DL — SIGNIFICANT CHANGE UP (ref 1.6–2.6)
MCHC RBC-ENTMCNC: 30.1 PG — SIGNIFICANT CHANGE UP (ref 27–34)
MCHC RBC-ENTMCNC: 31.7 GM/DL — LOW (ref 32–36)
MCV RBC AUTO: 94.8 FL — SIGNIFICANT CHANGE UP (ref 80–100)
PLATELET # BLD AUTO: 170 K/UL — SIGNIFICANT CHANGE UP (ref 150–400)
POTASSIUM SERPL-MCNC: 4.5 MMOL/L — SIGNIFICANT CHANGE UP (ref 3.5–5.3)
POTASSIUM SERPL-MCNC: 4.6 MMOL/L — SIGNIFICANT CHANGE UP (ref 3.5–5.3)
POTASSIUM SERPL-SCNC: 4.5 MMOL/L — SIGNIFICANT CHANGE UP (ref 3.5–5.3)
POTASSIUM SERPL-SCNC: 4.6 MMOL/L — SIGNIFICANT CHANGE UP (ref 3.5–5.3)
PROTHROM AB SERPL-ACNC: 20.4 SEC — HIGH (ref 10.6–13.6)
RBC # BLD: 3.09 M/UL — LOW (ref 4.2–5.8)
RBC # FLD: 15 % — HIGH (ref 10.3–14.5)
SARS-COV-2 RNA SPEC QL NAA+PROBE: SIGNIFICANT CHANGE UP
SODIUM SERPL-SCNC: 136 MMOL/L — SIGNIFICANT CHANGE UP (ref 135–145)
SODIUM SERPL-SCNC: 138 MMOL/L — SIGNIFICANT CHANGE UP (ref 135–145)
WBC # BLD: 13.72 K/UL — HIGH (ref 3.8–10.5)
WBC # FLD AUTO: 13.72 K/UL — HIGH (ref 3.8–10.5)

## 2020-11-25 PROCEDURE — 99233 SBSQ HOSP IP/OBS HIGH 50: CPT

## 2020-11-25 PROCEDURE — 93010 ELECTROCARDIOGRAM REPORT: CPT

## 2020-11-25 PROCEDURE — 71045 X-RAY EXAM CHEST 1 VIEW: CPT | Mod: 26

## 2020-11-25 RX ORDER — ACETAMINOPHEN 500 MG
650 TABLET ORAL ONCE
Refills: 0 | Status: COMPLETED | OUTPATIENT
Start: 2020-11-25 | End: 2020-11-25

## 2020-11-25 RX ORDER — WARFARIN SODIUM 2.5 MG/1
2 TABLET ORAL ONCE
Refills: 0 | Status: COMPLETED | OUTPATIENT
Start: 2020-11-25 | End: 2020-11-25

## 2020-11-25 RX ADMIN — Medication 5 MILLIGRAM(S): at 11:26

## 2020-11-25 RX ADMIN — Medication 5 MILLIGRAM(S): at 06:11

## 2020-11-25 RX ADMIN — LEVETIRACETAM 750 MILLIGRAM(S): 250 TABLET, FILM COATED ORAL at 06:12

## 2020-11-25 RX ADMIN — GABAPENTIN 100 MILLIGRAM(S): 400 CAPSULE ORAL at 23:09

## 2020-11-25 RX ADMIN — LEVETIRACETAM 750 MILLIGRAM(S): 250 TABLET, FILM COATED ORAL at 17:59

## 2020-11-25 RX ADMIN — AMIODARONE HYDROCHLORIDE 200 MILLIGRAM(S): 400 TABLET ORAL at 06:12

## 2020-11-25 RX ADMIN — PANTOPRAZOLE SODIUM 40 MILLIGRAM(S): 20 TABLET, DELAYED RELEASE ORAL at 06:12

## 2020-11-25 RX ADMIN — Medication 5 MILLIGRAM(S): at 23:09

## 2020-11-25 RX ADMIN — Medication 650 MILLIGRAM(S): at 14:25

## 2020-11-25 RX ADMIN — Medication 1 MILLIGRAM(S): at 11:16

## 2020-11-25 RX ADMIN — CHLORHEXIDINE GLUCONATE 1 APPLICATION(S): 213 SOLUTION TOPICAL at 11:15

## 2020-11-25 RX ADMIN — Medication 1 TABLET(S): at 11:15

## 2020-11-25 RX ADMIN — PANTOPRAZOLE SODIUM 40 MILLIGRAM(S): 20 TABLET, DELAYED RELEASE ORAL at 17:59

## 2020-11-25 RX ADMIN — Medication 50 MILLIGRAM(S): at 17:59

## 2020-11-25 RX ADMIN — WARFARIN SODIUM 2 MILLIGRAM(S): 2.5 TABLET ORAL at 23:09

## 2020-11-25 RX ADMIN — Medication 500 MILLIGRAM(S): at 11:15

## 2020-11-25 RX ADMIN — QUETIAPINE FUMARATE 25 MILLIGRAM(S): 200 TABLET, FILM COATED ORAL at 23:09

## 2020-11-25 RX ADMIN — Medication 650 MILLIGRAM(S): at 13:25

## 2020-11-25 RX ADMIN — Medication 50 MILLIGRAM(S): at 11:15

## 2020-11-25 RX ADMIN — ATORVASTATIN CALCIUM 40 MILLIGRAM(S): 80 TABLET, FILM COATED ORAL at 23:09

## 2020-11-25 RX ADMIN — SENNA PLUS 2 TABLET(S): 8.6 TABLET ORAL at 23:08

## 2020-11-25 RX ADMIN — Medication 81 MILLIGRAM(S): at 11:26

## 2020-11-25 RX ADMIN — Medication 50 MILLIGRAM(S): at 06:12

## 2020-11-25 NOTE — PROGRESS NOTE ADULT - PROBLEM SELECTOR PLAN 1
neurologically at baseline (intermittent confusion)  hemodynamically stable off pressors and inotropic support  bradycardia resolved, now A Flutter on Lopressor 50 mg TID and PO Amio   continue aspirin for acute graft closure prophylaxis  continue statin for chronic graft patency prophylaxis  Encourage coughing and deep breathing exercises/incentive spirometry   nocturnal BiPAP as tolerated, pt refusing most nights (at best he tolerates 3 hrs)  Ambulate with PT assist as tolerated later today

## 2020-11-25 NOTE — PROGRESS NOTE ADULT - SUBJECTIVE AND OBJECTIVE BOX
[facility], [facility]                                          [address], [address], [place] [address]                                               PHONE: [phone]    FAX: [phone] [address], [address], [place] [address]                                       PHONE: [phone]    FAX: [phone]  *******************************************************************************    Overnight events/Subjective Assessment: No overnight cardiac events.  No CP/palp/SOB.  C/o fatigue    INTERPRETATION OF TELEMETRY (personally reviewed): sinus rhythm     MEDICATIONS  (STANDING):  aMIOdarone    Tablet   Oral   aMIOdarone    Tablet 200 milliGRAM(s) Oral daily  ascorbic acid 500 milliGRAM(s) Oral daily  aspirin enteric coated 81 milliGRAM(s) Oral daily  atorvastatin 40 milliGRAM(s) Oral at bedtime  chlorhexidine 2% Cloths 1 Application(s) Topical daily  folic acid 1 milliGRAM(s) Oral daily  gabapentin 100 milliGRAM(s) Oral at bedtime  levETIRAcetam 750 milliGRAM(s) Oral two times a day  melatonin 5 milliGRAM(s) Oral at bedtime  methylphenidate 5 milliGRAM(s) Oral <User Schedule>  metoprolol tartrate 50 milliGRAM(s) Oral every 6 hours  multivitamin 1 Tablet(s) Oral daily  pantoprazole    Tablet 40 milliGRAM(s) Oral two times a day  QUEtiapine 25 milliGRAM(s) Oral at bedtime  senna 2 Tablet(s) Oral at bedtime  sodium chloride 0.9%. 1000 milliLiter(s) (10 mL/Hr) IV Continuous <Continuous>  warfarin 2 milliGRAM(s) Oral once    MEDICATIONS  (PRN):  benzocaine 15 mG/menthol 3.6 mG (Sugar-Free) Lozenge 1 Lozenge Oral three times a day PRN Sore Throat  bisacodyl Suppository 10 milliGRAM(s) Rectal daily PRN Constipation  lidocaine   Patch 1 Patch Transdermal every 24 hours PRN as needed  ondansetron Injectable 4 milliGRAM(s) IV Push every 6 hours PRN Nausea and/or Vomiting      Vital Signs Last 24 Hrs  T(C): 36.6 (25 Nov 2020 12:00), Max: 36.9 (25 Nov 2020 07:00)  T(F): 97.9 (25 Nov 2020 12:00), Max: 98.5 (25 Nov 2020 07:00)  HR: 96 (25 Nov 2020 14:00) (78 - 97)  BP: 121/65 (25 Nov 2020 14:00) (89/53 - 121/65)  BP(mean): 88 (25 Nov 2020 14:00) (65 - 92)  RR: 23 (25 Nov 2020 14:00) (12 - 39)  SpO2: 93% (25 Nov 2020 14:00) (93% - 100%)    PHYSICAL EXAM:  General: Appears well developed, well nourished, no acute distress  HEENT: Head: normocephalic, atraumatic  Eyes: Pupils equal and reactive  Neck: Supple, no carotid bruit, no JVD, no HJR  CARDIOVASCULAR: Tachycardic, regular, Normal S1 and S2, no murmur, rub, or gallop  LUNGS: Diminished at bases, otherwise clear to auscultation bilaterally, no rales, rhonchi or wheeze  CHEST: Clean sternotomy dressing  ABDOMEN: Soft, nontender, non-distended, positive bowel sounds, no mass or bruit  EXTREMITIES: Trace LE edema, presacral edema noted, distal pulses WNL, dressings c/d/i  SKIN: Warm and dry with normal turgor  NEURO: Alert & oriented x 3, grossly intact  PSYCH: normal mood and affect          LABS:               9.3    13.72 )-----------( 170      ( 25 Nov 2020 03:04 )             29.3       11-25    138  |  97<L>  |  42.0<H>  ----------------------------<  99  4.5   |  24.0  |  4.36<H>    Ca    8.4<L>      25 Nov 2020 03:04  Mg     2.2     11-25    TPro  5.8<L>  /  Alb  2.8<L>  /  TBili  2.0  /  DBili  1.3<H>  /  AST  48<H>  /  ALT  34  /  AlkPhos  140<H>  11-24      ASSESSMENT AND PLAN:  In summary, JONATHAN GIBSON is a 60M s/p re-op CABGx2 (SVG->OM/RPDA), bioAVR & bioMVR 11/13/20, post-op junctional rhythm resolved, now AT/AFlutter 100s, LBBB, ARF/CRI now on HD (s/p permacath 11/20/20), anemia s/p PRBCs, volume overload improving, h/o CAD s/p stent LAD, normal LV fxn, type A dissection repair 2010, known chronic type B dissection, chronic diastolic CHF, CVA w/ L-sided sensory loss, DM, HTN, HL, psoriasis on Humira, likely COVID in 9/20 (+ Ab), prior cocaine/opioid/benzo/EtoH abuse    - Patient improving clinically post-operatively, s/p reop AVR 25mm CE [id number], MVR 31mm Magna Ease, CABGx2 (DGV-OM, RPDA) with Dr. Butler 11/13/20.  - Cardiac cath 11/10/20: severe 2 vessel CAD.  Chronically occluded proximal RCA with collaterals from the LCA.  EF 50%.  Elevated LVEDP.  - Echocardiogram 11/4/20 EF 50-55%, grade II diastolic dysfunction, mildly reduced RV systolic function, severe LAE, mild KOLTON, mod-severe MR and AI, mod TR, dilated aortic root @ 4.2 cm  - Junctional rhythm post-operatively resolved, no events overnight, LBBB present.  No indication for a PPM at this time.  Patient was evaluated by Dr. Raygoza (EP).  Amiodarone load in place with 400 tid x12 doses then 200 daily.  Agree with uptitration of Lopressor to 50 q8h.  Paroxsymal atrial tachycardia noted now back to sinus rhythm.  Continue to monitor.   - ASA 81 daily in place  - Lipitor 40 daily in place  - ARF/CRI now on HD, s/p permacath.  Nephrology follow-up.  - Patient with AFlutter with increased [id number] risk score currently maintained on oral AC with Coumadin.  Dosing per primary team.  Goal INR 2-3.  - DM.  Glycemic control per primary team.  - Valvular heart disease. s/p reoperative AVR and now MVR. SBE prophylaxis for procedures that entail bacteremia.  - Seizure disorder. Keppra in place.  - Low platelets, improving.  No evidence of active bleeding. Further management as per CTS.  - Pain management as per CTS.   - Continue to encourage out of bed.

## 2020-11-25 NOTE — PROGRESS NOTE ADULT - SUBJECTIVE AND OBJECTIVE BOX
[facility] DIVISION OF KIDNEY DISEASES AND HYPERTENSION -- HEMODIALYSIS NOTE  --------------------------------------------------------------------------------  Chief Complaint: ESRD/Ongoing hemodialysis requirement    24 hour events/subjective:        PAST HISTORY  --------------------------------------------------------------------------------  No significant changes to PMH, PSH, FHx, SHx, unless otherwise noted    ALLERGIES & MEDICATIONS  --------------------------------------------------------------------------------  Allergies    penicillin (Unknown)    Intolerances      Standing Inpatient Medications  aMIOdarone    Tablet   Oral   aMIOdarone    Tablet 200 milliGRAM(s) Oral daily  ascorbic acid 500 milliGRAM(s) Oral daily  aspirin enteric coated 81 milliGRAM(s) Oral daily  atorvastatin 40 milliGRAM(s) Oral at bedtime  chlorhexidine 2% Cloths 1 Application(s) Topical daily  folic acid 1 milliGRAM(s) Oral daily  gabapentin 100 milliGRAM(s) Oral at bedtime  levETIRAcetam 750 milliGRAM(s) Oral two times a day  melatonin 5 milliGRAM(s) Oral at bedtime  methylphenidate 5 milliGRAM(s) Oral <User Schedule>  metoprolol tartrate 50 milliGRAM(s) Oral every 6 hours  multivitamin 1 Tablet(s) Oral daily  pantoprazole    Tablet 40 milliGRAM(s) Oral two times a day  QUEtiapine 25 milliGRAM(s) Oral at bedtime  senna 2 Tablet(s) Oral at bedtime  sodium chloride 0.9%. 1000 milliLiter(s) IV Continuous <Continuous>  warfarin 2 milliGRAM(s) Oral once    PRN Inpatient Medications  benzocaine 15 mG/menthol 3.6 mG (Sugar-Free) Lozenge 1 Lozenge Oral three times a day PRN  bisacodyl Suppository 10 milliGRAM(s) Rectal daily PRN  lidocaine   Patch 1 Patch Transdermal every 24 hours PRN  ondansetron Injectable 4 milliGRAM(s) IV Push every 6 hours PRN      REVIEW OF SYSTEMS  --------------------------------------------------------------------------------  Gen: No weight changes, fatigue, fevers/chills, weakness  Skin: No rashes  Head/Eyes/Ears/Mouth: No headache; Normal hearing; Normal vision w/o blurriness; No sinus pain/discomfort, sore throat  Respiratory: No dyspnea, cough, wheezing, hemoptysis  CV: No chest pain, PND, orthopnea  GI: No abdominal pain, diarrhea, constipation, nausea, vomiting, melena, hematochezia  : No increased frequency, dysuria, hematuria, nocturia  MSK: No joint pain/swelling; no back pain; no edema  Neuro: No dizziness/lightheadedness, weakness, seizures, numbness, tingling  Heme: No easy bruising or bleeding  Endo: No heat/cold intolerance  Psych: No significant nervousness, anxiety, stress, depression    All other systems were reviewed and are negative, except as noted.    VITALS/PHYSICAL EXAM  --------------------------------------------------------------------------------  T(C): 36.6 (11-25-20 @ 12:00), Max: 37.1 (11-24-20 @ 13:45)  HR: 92 (11-25-20 @ 11:00) (78 - 97)  BP: 116/66 (11-25-20 @ 11:00) (89/53 - 129/68)  RR: 17 (11-25-20 @ 11:00) (12 - 39)  SpO2: 100% (11-25-20 @ 11:00) (93% - 100%)  Wt(kg): --        11-24-20 @ 07:01  -  11-25-20 @ 07:00  --------------------------------------------------------  IN: 500 mL / OUT: 355 mL / NET: 145 mL    11-25-20 @ 07:01  -  11-25-20 @ 12:54  --------------------------------------------------------  IN: 0 mL / OUT: 40 mL / NET: -40 mL      Physical Exam:  	Gen: NAD, Pale Ill-appearing  	HEENT: PERRL, supple neck,   	Pulm: CTA B/L  	CV: RRR, S1S2; no rub, Drains D.Dawson,   	Back: No spinal or CVA tenderness; no sacral edema  	Abd: +BS, soft, nontender/nondistended  	: No suprapubic tenderness  	UE: Warm, FROM, no clubbing, intact strength; no edema; no asterixis  	LE: Warm, FROM, no clubbing, intact strength; no edema  	Neuro: No focal deficits, intact gait ( Limited ~ 10 Feet )  	Psych: Flat  affect and mood  	Skin: Warm, without rashes  	Vascular access: TDC,     LABS/STUDIES  --------------------------------------------------------------------------------              9.3    13.72 >-----------<  170      [11-25-20 @ 03:04]              29.3     138  |  97  |  42.0  ----------------------------<  99      [11-25-20 @ 03:04]  4.5   |  24.0  |  4.36        Ca     8.4     [11-25-20 @ 03:04]      Mg     2.2     [11-25-20 @ 03:04]    TPro  5.8  /  Alb  2.8  /  TBili  2.0  /  DBili  1.3  /  AST  48  /  ALT  34  /  AlkPhos  140  [11-24-20 @ 13:03]    PT/INR: PT 20.4 , INR 1.81       [11-25-20 @ 03:04]  PTT: 33.9       [11-24-20 @ 02:55]      Iron 29, TIBC 332, %sat 9      [11-03-20 @ 08:18]  Ferritin 230      [11-03-20 @ 08:18]  TSH 1.95      [11-03-20 @ 08:18]  Lipid: chol --, , HDL --, LDL --      [11-15-20 @ 02:40]    HBsAb <3.0      [11-07-20 @ 05:41]  HBsAg Nonreact      [11-07-20 @ 05:41]  HBcAb Nonreact      [11-07-20 @ 05:41]  HCV 0.07, Nonreact      [11-07-20 @ 05:41]

## 2020-11-25 NOTE — PROGRESS NOTE ADULT - ASSESSMENT
60 year old male with PMH Type A dissection (2010) c/b bowel ischemia (colostomy since reversed, wound dehiscence), CVA w/ residual left sided weakness, seizures, cocaine/ alcohol/ opioid/ benzo abuse, HTN, likely COVID in Sept, (+ Ab), CAD s/p stent in LAD, MR and AI, uncontrolled HTN  11/2 presents from home with SOB and BLE edema.   A/w acute diastolic heart failure, moderate-severe MR, moderate TR, mod-severe AI, SANTOS on CKD requiring HD, anemia (likely d/t chronic renal disease, GI w/u unremarkable). LHC revealed severe 2 vessel CAD and known residual Type B dissection.   11/13 s/p resternotomy, CABG x 2 (SVG to OM, RPDA), AVR, MVR. Intra-op acute blood loss anemia and coagulopathy.  Post-op course notable for cardiogenic shock now resolved, acute blood loss anemia (improved), acute hypoxemic respiratory failure, junctional rhythm (with periods of ventricular standstill) now resolved (ST 100s), Supratherapeutic INR.    Per CTS :      Problem/Plan - 1:  ·  Problem: Mitral and aortic regurgitation.  Plan: neurologically at baseline (intermittent confusion)  hemodynamically stable off pressors and inotropic support  bradycardia resolved, now A Flutter on Lopressor 50 mg TID and PO Amiodarone,    continue aspirin for acute graft closure prophylaxis  continue statin for chronic graft patency prophylaxis  nocturnal BiPAP as tolerated,      Problem/Plan - 2:  ·  Problem: Atrial flutter.  Plan: On  Amiodarone  200 daily &  Lopressor 50 mg q6  Cardioversion later today.      Problem/Plan - 3:  ·  Problem: Anemia due to other cause.  Plan: stable, On REINALDO,        Problem/Plan - 4:  ·  Problem: ESRD (end stage renal disease).  Plan: s/p TDC on 11/20    No plans currently for AVF as per Vascular Surgery  adjust meds per eGFR,  Hold nephrotoxic meds  bladder scan daily  Straight Cath 11/25 am for residual > 350 ml.,  with no urge to urinate        Problem/Plan - 5:  ·  Problem: Acute diastolic heart failure.  Plan: supportive care.      Problem/Plan - 6:  Problem: Supratherapeutic INR. Plan: Required Vit K,  Daily INR  Daily Coumadin.     Problem/Plan - 7:  ·  Problem: CVA (cerebral vascular accident).  Plan: Hx of CVA   On  ASA and atorvastatin       Problem/Plan - 8:  ·  Problem: Seizures.  Plan: History of seizures.  On Keppra.     HD TIW,

## 2020-11-25 NOTE — PROGRESS NOTE ADULT - SUBJECTIVE AND OBJECTIVE BOX
Subjective:  59yo M resting comfortable, not tolerating bipap.      HPI:  Patient is a 60 yoM with PMH of CAD s/p stent in LAD, leaky aortic valve, uncontrolled bp, thoracic aortic dissection requiring emergent surgery, CVA with left sided sensory loss, colostomy for bowel ischemia s/p reversal, psoriasis on Humira injections presents from home with sob and le edema. Patient states the past several weeks, he has not been taking his medications because he forgot. Patient states the sob has gotten worse over the past few days and also associated with chest pressure. Patient also hasnt seen his cardiologist in several months. Patient worked up in the er and had an elevated bnp and htn urgency and eric. Patient seen at bedside and states feeling a little better.  (2020 14:32)          PAST MEDICAL & SURGICAL HISTORY:  CVA (cerebral vascular accident)    CHF (congestive heart failure)    H/O colectomy    Aorta disorder            MEDICATIONS  (STANDING):  aMIOdarone    Tablet   Oral   aMIOdarone    Tablet 200 milliGRAM(s) Oral daily  ascorbic acid 500 milliGRAM(s) Oral daily  aspirin enteric coated 81 milliGRAM(s) Oral daily  atorvastatin 40 milliGRAM(s) Oral at bedtime  chlorhexidine 2% Cloths 1 Application(s) Topical daily  folic acid 1 milliGRAM(s) Oral daily  gabapentin 100 milliGRAM(s) Oral at bedtime  levETIRAcetam 750 milliGRAM(s) Oral two times a day  melatonin 5 milliGRAM(s) Oral at bedtime  methylphenidate 5 milliGRAM(s) Oral <User Schedule>  metoprolol tartrate 50 milliGRAM(s) Oral every 6 hours  multivitamin 1 Tablet(s) Oral daily  pantoprazole    Tablet 40 milliGRAM(s) Oral two times a day  QUEtiapine 25 milliGRAM(s) Oral at bedtime  senna 2 Tablet(s) Oral at bedtime  sodium chloride 0.9%. 1000 milliLiter(s) (10 mL/Hr) IV Continuous <Continuous>    MEDICATIONS  (PRN):  benzocaine 15 mG/menthol 3.6 mG (Sugar-Free) Lozenge 1 Lozenge Oral three times a day PRN Sore Throat  bisacodyl Suppository 10 milliGRAM(s) Rectal daily PRN Constipation  lidocaine   Patch 1 Patch Transdermal every 24 hours PRN as needed  ondansetron Injectable 4 milliGRAM(s) IV Push every 6 hours PRN Nausea and/or Vomiting          Allergies    penicillin (Unknown)    Intolerances          WEIGHTS:  Daily     Daily Weight in k.9 (2020 09:45)  Admit Wt: Drug Dosing Weight  Height (cm): 165.1 (2020 08:55)  Weight (kg): 112 (2020 12:00)  BMI (kg/m2): 41.1 (2020 12:00)  BSA (m2): 2.16 (2020 12:00)  I&O's Summary    :  -  2020 07:  --------------------------------------------------------  IN: 340 mL / OUT: 145 mL / NET: 195 mL      -  2020 04:40  --------------------------------------------------------  IN: 500 mL / OUT: 95 mL / NET: 405 mL        VITAL SIGNS:  ICU Vital Signs Last 24 Hrs  T(C): 36.5 (:13), Max: 37.1 (2020 09:45)  T(F): 97.7 (:13), Max: 98.7 (2020 09:45)  HR: 96 (2020 03:00) (78 - 97)  BP: 107/67 (:00) (89/53 - 129/68)  BP(mean): 83 (2020 03:00) (65 - 94)  ABP: --  ABP(mean): --  RR: 15 (2020 03:00) (9 - 29)  SpO2: 99% (:00) (91% - 100%)        All laboratory results, radiology and medications reviewed.    LABS:      138  |  97<L>  |  42.0<H>  ----------------------------<  99  4.5   |  24.0  |  4.36<H>    Ca    8.4<L>      2020 03:04  Mg     2.2         TPro  5.8<L>  /  Alb  2.8<L>  /  TBili  2.0  /  DBili  1.3<H>  /  AST  48<H>  /  ALT  34  /  AlkPhos  140<H>                                   9.3    13.72 )-----------( 170      ( 2020 03:04 )             29.3          PT/INR - ( 2020 03:04 )   PT: 20.4 sec;   INR: 1.81 ratio         PTT - ( 2020 02:55 )  PTT:33.9 sec  Bilirubin Direct, Serum: 1.3 mg/dL ( @ 13:03)  Bilirubin Total, Serum: 2.0 mg/dL ( @ 13:03)          CAPILLARY BLOOD GLUCOSE                 PHYSICAL EXAM:  General:  well nourished, no acute distress  Neurology:  alert and oriented X 4, nonfocal, no gross deficits with intermittent confusion  Respiratory:  clear to auscultation bilaterally  CV:  A Flutter  Abdomen:  soft, nontender, nondistended, positive bowel sounds  Extremities:  warm, well perfused, no edema +DP pulses  Incisions:  midline sternal incision, c/d/i, sternum stable

## 2020-11-25 NOTE — PROGRESS NOTE ADULT - SUBJECTIVE AND OBJECTIVE BOX
Patient sitting in recliner.   Assisted with RN to sit back in chair.  He complains of pain everywhere.     REVIEW OF SYSTEMS  Constitutional - No fever,  +fatigue  Neurological - +memory loss, +loss of strength    FUNCTIONAL PROGRESS  11/24  Sit-Stand Transfer Training  Sit-to-Stand Transfer Training Rehab Potential: good, to achieve stated therapy goals  Transfer Training Sit-to-Stand Transfer: moderate assist (50% patient effort);  1 person assist;  verbal cues;  rolling walker  Transfer Training Stand-to-Sit Transfer: minimum assist (75% patient effort);  1 person assist;  verbal cues;  rolling walker  Sit-to-Stand Transfer Training Transfer Safety Analysis: decreased balance;  impaired balance;  decreased strength;  rolling walker    Gait Training  Gait Training Rehab Potential: good, to achieve stated therapy goals  Gait Training: minimum assist (75% patient effort);  1 person + 1 person to manage equipment;  chair follow;  rolling walker;  10 feet  Gait Analysis: 2-point gait   impaired balance;  decreased strength;  10 feet;  rolling walker      VITALS  T(C): 36.9 (11-25-20 @ 07:00), Max: 37.1 (11-24-20 @ 09:45)  HR: 95 (11-25-20 @ 08:00) (78 - 97)  BP: 120/65 (11-25-20 @ 08:00) (89/53 - 129/68)  RR: 21 (11-25-20 @ 08:00) (9 - 29)  SpO2: 99% (11-25-20 @ 08:00) (93% - 100%)  Wt(kg): --    MEDICATIONS   aMIOdarone    Tablet 200 milliGRAM(s) daily  aMIOdarone    Tablet     ascorbic acid 500 milliGRAM(s) daily  aspirin enteric coated 81 milliGRAM(s) daily  atorvastatin 40 milliGRAM(s) at bedtime  benzocaine 15 mG/menthol 3.6 mG (Sugar-Free) Lozenge 1 Lozenge three times a day PRN  bisacodyl Suppository 10 milliGRAM(s) daily PRN  chlorhexidine 2% Cloths 1 Application(s) daily  folic acid 1 milliGRAM(s) daily  gabapentin 100 milliGRAM(s) at bedtime  levETIRAcetam 750 milliGRAM(s) two times a day  lidocaine   Patch 1 Patch every 24 hours PRN  melatonin 5 milliGRAM(s) at bedtime  methylphenidate 5 milliGRAM(s) <User Schedule>  metoprolol tartrate 50 milliGRAM(s) every 6 hours  multivitamin 1 Tablet(s) daily  ondansetron Injectable 4 milliGRAM(s) every 6 hours PRN  pantoprazole    Tablet 40 milliGRAM(s) two times a day  QUEtiapine 25 milliGRAM(s) at bedtime  senna 2 Tablet(s) at bedtime  sodium chloride 0.9%. 1000 milliLiter(s) <Continuous>  warfarin 2 milliGRAM(s) once      RECENT LABS/IMAGING                          9.3    13.72 )-----------( 170      ( 25 Nov 2020 03:04 )             29.3     11-25    138  |  97<L>  |  42.0<H>  ----------------------------<  99  4.5   |  24.0  |  4.36<H>    Ca    8.4<L>      25 Nov 2020 03:04  Mg     2.2     11-25    TPro  5.8<L>  /  Alb  2.8<L>  /  TBili  2.0  /  DBili  1.3<H>  /  AST  48<H>  /  ALT  34  /  AlkPhos  140<H>  11-24    PT/INR - ( 25 Nov 2020 03:04 )   PT: 20.4 sec;   INR: 1.81 ratio         PTT - ( 24 Nov 2020 02:55 )  PTT:33.9 sec          Sit-Stand Transfer Training  Sit-to-Stand Transfer Training Rehab Potential: good, to achieve stated therapy goals  Transfer Training Sit-to-Stand Transfer: moderate assist (50% patient effort);  1 person assist;  verbal cues;  rolling walker  Transfer Training Stand-to-Sit Transfer: minimum assist (75% patient effort);  1 person assist;  verbal cues;  rolling walker  Sit-to-Stand Transfer Training Transfer Safety Analysis: decreased balance;  impaired balance;  decreased strength;  rolling walker    Gait Training  Gait Training Rehab Potential: good, to achieve stated therapy goals  Gait Training: minimum assist (75% patient effort);  1 person + 1 person to manage equipment;  chair follow;  rolling walker;  10 feet  Gait Analysis: 2-point gait   impaired balance;  decreased strength;  10 feet;  rolling walker      CTA NECK/BRAIN 11/12 - 70% stenosis in the right carotid bulb. Aortic arch dissection extending into the great vessels, as above. Motion artifacts limits evaluation of the left common carotid artery.    CXR 11/16 - Retrocardiac opacity, likely atelectasis.    CXR 11/19 - Status post open heart surgery. No evidence of active chest pathology. Catheters and/or tubes in place    ABD XR 11/23 - Diffuse air-filled bowel ileus. Follow-up upright or lateral decubitus KUB recommended to evaluate for static versus differential air-fluid levels    TTE 11/23 - Summary:   1. Left ventricular ejection fraction, by visual estimation, is 40%%.   2. Technically adequate study.   3. Moderately decreased global left ventricular systolic function.   4. Multiple left ventricular regional wall motion abnormalities exist. See wall motion findings.   5. The left ventricular diastolic function could not be assessed in this study.   6. Moderately reduced RV systolic function.   7. Trivial pericardial effusion.   8. A bioprosthetic valve is present in the mitral position.   9. Trace mitral valve regurgitation.  10. Moderate tricuspid regurgitation.  11. Bioprosthesis in the aortic position.  12. Estimated pulmonary artery systolic pressure is 40.9 mmHg assuming a right atrial pressure of 8 mmHg, which is consistent with mild pulmonary hypertension.  13. Endocardial visualization was enhanced with intravenous echo contrast.  14. Mitral valve mean gradient is 2.2 mmHg consistent with mild mitral stenosis.    ----------------------------------------------------------------------------------------  PHYSICAL EXAM  Constitutional - NAD, Comfortable  Extremities - Diffuse mild edema   Neurologic Exam -                    Cognitive - Awake, Alert, AAO to self, PART of situation     Communication - Fluent, +dysarthria     Motor -  No apparent focal deficits      Sensory - Decreased to the left      Coordination - FTN impaired  Psychiatric - Mood depressed, anxious  ----------------------------------------------------------------------------------------  ASSESSMENT/PLAN  60yMale with functional deficits after debility related to severe MR and AR s/p AVR/MVR   CAD s/p CABGx2, Old CVA - Coumadin, ASA, Lipitor  HTN - Lopressor  AFIB - Amiodarone   Ileus - Reglan  Pain - Tylenol, Lidoderm, Neurontin  Sleep - Melatonin  Mood - Seroquel  Seizures - Keppra  DVT PPX - SCDs  Rehab - Medically being optimized. Patient may demonstrate progress for alternate rehab pending ongoing follow ups. Patient does live alone and there is concern for patent's self care. Will continue to follow. Functional progress will determine ongoing rehab dispo recommendations, which may change.    Continue bedside therapy as well as OOB throughout the day with mobilization by staff to maintain cardiopulmonary function and prevention of secondary complications related to debility.     Discussed with rehab team.

## 2020-11-26 LAB
ANION GAP SERPL CALC-SCNC: 16 MMOL/L — SIGNIFICANT CHANGE UP (ref 5–17)
BUN SERPL-MCNC: 63 MG/DL — HIGH (ref 8–20)
CALCIUM SERPL-MCNC: 8 MG/DL — LOW (ref 8.6–10.2)
CHLORIDE SERPL-SCNC: 96 MMOL/L — LOW (ref 98–107)
CO2 SERPL-SCNC: 24 MMOL/L — SIGNIFICANT CHANGE UP (ref 22–29)
CREAT SERPL-MCNC: 5.05 MG/DL — HIGH (ref 0.5–1.3)
GLUCOSE SERPL-MCNC: 99 MG/DL — SIGNIFICANT CHANGE UP (ref 70–99)
HCT VFR BLD CALC: 28.7 % — LOW (ref 39–50)
HGB BLD-MCNC: 9 G/DL — LOW (ref 13–17)
INR BLD: 3.46 RATIO — HIGH (ref 0.88–1.16)
INR BLD: 4.39 RATIO — HIGH (ref 0.88–1.16)
INR BLD: 5.1 RATIO — CRITICAL HIGH (ref 0.88–1.16)
MAGNESIUM SERPL-MCNC: 2.1 MG/DL — SIGNIFICANT CHANGE UP (ref 1.6–2.6)
MCHC RBC-ENTMCNC: 29.8 PG — SIGNIFICANT CHANGE UP (ref 27–34)
MCHC RBC-ENTMCNC: 31.4 GM/DL — LOW (ref 32–36)
MCV RBC AUTO: 95 FL — SIGNIFICANT CHANGE UP (ref 80–100)
PLATELET # BLD AUTO: 174 K/UL — SIGNIFICANT CHANGE UP (ref 150–400)
POTASSIUM SERPL-MCNC: 4.5 MMOL/L — SIGNIFICANT CHANGE UP (ref 3.5–5.3)
POTASSIUM SERPL-SCNC: 4.5 MMOL/L — SIGNIFICANT CHANGE UP (ref 3.5–5.3)
PROTHROM AB SERPL-ACNC: 37.8 SEC — HIGH (ref 10.6–13.6)
PROTHROM AB SERPL-ACNC: 47.5 SEC — HIGH (ref 10.6–13.6)
PROTHROM AB SERPL-ACNC: 54.7 SEC — HIGH (ref 10.6–13.6)
RBC # BLD: 3.02 M/UL — LOW (ref 4.2–5.8)
RBC # FLD: 15.1 % — HIGH (ref 10.3–14.5)
SODIUM SERPL-SCNC: 136 MMOL/L — SIGNIFICANT CHANGE UP (ref 135–145)
WBC # BLD: 13.5 K/UL — HIGH (ref 3.8–10.5)
WBC # FLD AUTO: 13.5 K/UL — HIGH (ref 3.8–10.5)

## 2020-11-26 PROCEDURE — 99233 SBSQ HOSP IP/OBS HIGH 50: CPT

## 2020-11-26 PROCEDURE — 71045 X-RAY EXAM CHEST 1 VIEW: CPT | Mod: 26

## 2020-11-26 RX ORDER — PHYTONADIONE (VIT K1) 5 MG
1 TABLET ORAL ONCE
Refills: 0 | Status: COMPLETED | OUTPATIENT
Start: 2020-11-26 | End: 2020-11-26

## 2020-11-26 RX ADMIN — Medication 5 MILLIGRAM(S): at 06:15

## 2020-11-26 RX ADMIN — LEVETIRACETAM 750 MILLIGRAM(S): 250 TABLET, FILM COATED ORAL at 18:19

## 2020-11-26 RX ADMIN — Medication 100.2 MILLIGRAM(S): at 13:18

## 2020-11-26 RX ADMIN — PANTOPRAZOLE SODIUM 40 MILLIGRAM(S): 20 TABLET, DELAYED RELEASE ORAL at 18:20

## 2020-11-26 RX ADMIN — LEVETIRACETAM 750 MILLIGRAM(S): 250 TABLET, FILM COATED ORAL at 06:14

## 2020-11-26 RX ADMIN — Medication 5 MILLIGRAM(S): at 21:24

## 2020-11-26 RX ADMIN — ATORVASTATIN CALCIUM 40 MILLIGRAM(S): 80 TABLET, FILM COATED ORAL at 21:23

## 2020-11-26 RX ADMIN — PANTOPRAZOLE SODIUM 40 MILLIGRAM(S): 20 TABLET, DELAYED RELEASE ORAL at 06:15

## 2020-11-26 RX ADMIN — Medication 5 MILLIGRAM(S): at 11:20

## 2020-11-26 RX ADMIN — GABAPENTIN 100 MILLIGRAM(S): 400 CAPSULE ORAL at 21:23

## 2020-11-26 RX ADMIN — QUETIAPINE FUMARATE 25 MILLIGRAM(S): 200 TABLET, FILM COATED ORAL at 21:23

## 2020-11-26 RX ADMIN — Medication 1 TABLET(S): at 11:21

## 2020-11-26 RX ADMIN — Medication 50 MILLIGRAM(S): at 11:22

## 2020-11-26 RX ADMIN — Medication 1 MILLIGRAM(S): at 11:21

## 2020-11-26 RX ADMIN — Medication 50 MILLIGRAM(S): at 06:15

## 2020-11-26 RX ADMIN — Medication 500 MILLIGRAM(S): at 11:22

## 2020-11-26 RX ADMIN — CHLORHEXIDINE GLUCONATE 1 APPLICATION(S): 213 SOLUTION TOPICAL at 11:23

## 2020-11-26 RX ADMIN — Medication 81 MILLIGRAM(S): at 11:23

## 2020-11-26 RX ADMIN — AMIODARONE HYDROCHLORIDE 200 MILLIGRAM(S): 400 TABLET ORAL at 06:14

## 2020-11-26 RX ADMIN — Medication 50 MILLIGRAM(S): at 23:06

## 2020-11-26 RX ADMIN — Medication 50 MILLIGRAM(S): at 01:34

## 2020-11-26 NOTE — PROGRESS NOTE ADULT - NEGATIVE GENERAL SYMPTOMS
Visit Information Date & Time Provider Department Dept. Phone Encounter #  
 8/4/2017  7:30 AM Len Flores MD [facility] [facility] [id number] Upcoming Health Maintenance Date Due Pneumococcal 19-64 Medium Risk (1 of 1 - PPSV23) 6/11/1972 BREAST CANCER SCRN MAMMOGRAM 9/9/2012 EYE EXAM RETINAL OR DILATED Q1 2/25/2016 MICROALBUMIN Q1 11/4/2016 HEMOGLOBIN A1C Q6M 11/19/2016 GLAUCOMA SCREENING Q2Y 2/25/2017 FOOT EXAM Q1 5/3/2017 LIPID PANEL Q1 5/19/2017 INFLUENZA AGE 9 TO ADULT 8/1/2017 PAP AKA CERVICAL CYTOLOGY 11/4/2018 DTaP/Tdap/Td series (2 - Td) 7/13/2021 COLONOSCOPY 11/17/2024 Allergies as of 8/4/2017  Review Complete On: 8/4/2017 By: Len Flores MD  
  
 Severity Noted Reaction Type Reactions Pcn [Penicillins] High 08/21/2009    Anaphylaxis Sulfa (Sulfonamide Antibiotics)  08/21/2009    Hives Current Immunizations  Reviewed on 1/26/2017 Name Date Influenza Vaccine 11/1/2016, 10/15/2014 TDAP Vaccine 7/13/2011  3:19 PM  
 Zoster Vaccine, Live 2/1/2013 Not reviewed this visit You Were Diagnosed With   
  
 Codes Comments Type 2 diabetes mellitus with hyperglycemia, without long-term current use of insulin (HCC)    -  Primary ICD-10-CM: E11.65 ICD-9-CM: 250.00, 790.29 HTN, goal below 130/80     ICD-10-CM: I10 
ICD-9-CM: 401.9 Other depression     ICD-10-CM: F32.89 ICD-9-CM: 351 Vitals BP Pulse Temp Resp Height(growth percentile) Weight(growth percentile) 101/69 (BP 1 Location: Left arm, BP Patient Position: Sitting) 82 97.9 °F (36.6 °C) (Oral) 16 5' 3\" (1.6 m) 167 lb (75.8 kg) LMP SpO2 BMI OB Status Smoking Status 07/02/1993 98% 29.58 kg/m2 Hysterectomy Never Smoker Vitals History BMI and BSA Data Body Mass Index Body Surface Area  
 29.58 kg/m 2 1.84 m 2 Preferred Pharmacy Pharmacy Name Phone [facility] [address], [facility] [address] [phone] Your Updated Medication List  
  
   
This list is accurate as of: 8/4/17  7:50 AM.  Always use your most recent med list.  
  
  
  
  
 Blood-Glucose Meter monitoring kit Commonly known as:  Gabe Martinez  
by Does Not Apply route daily. * buPROPion  mg tablet Commonly known as:  WELLBUTRIN XL  
TAKE 1 TABLET BY MOUTH EVERY EVENING** TAKE WITH 300MG TABLETS FOR A TOTAL OF 450MG DAILY**  
  
 * buPROPion  mg XL tablet Commonly known as:  WELLBUTRIN XL  
TAKE 1 TABLET BY MOUTH EVERY MORNING  
  
 CALCIUM 500+D 500 mg(1,250mg) -200 unit per tablet Generic drug:  calcium-vitamin D Take 1 Tab by mouth two (2) times daily (with meals). cyanocobalamin 1,000 mcg tablet Take 1,000 mcg by mouth daily. dapagliflozin 5 mg Tab tablet Commonly known as:  [employer] Take 1 Tab by mouth daily. appt needed for further refills  
  
 escitalopram oxalate 20 mg tablet Commonly known as:  McCarley Aas TAKE ONE TABLET BY MOUTH EVERY DAY  
  
 ESTRACE 0.01 % (0.1 mg/gram) vaginal cream  
Generic drug:  estradiol  
  
 ferrous sulfate 325 mg (65 mg iron) tablet Take  by mouth Daily (before breakfast). fluticasone 50 mcg/actuation nasal spray Commonly known as:  FLONASE  
1 [place] by Both Nostrils route two (2) times a day for 30 days. glucose blood VI test strips strip Commonly known as:  ASCENSIA AUTODISC VI, ONE TOUCH ULTRA TEST VI Testing bid HYDROcodone-acetaminophen 5-325 mg per tablet Commonly known as:  Orren Carolin Take 1 Tab by mouth nightly as needed for Pain. Max Daily Amount: 1 Tab. Lancets Misc Bid testing * lisinopril 5 mg tablet Commonly known as:  PRINIVIL, ZESTRIL  
TAKE 1 TABLET BY MOUTH EVERY DAY  
  
 * lisinopril 5 mg tablet Commonly known as:  PRINIVIL, ZESTRIL  
TAKE 1 TABLET BY MOUTH EVERY DAY  
  
 M-VIT PO Take  by mouth.  
  
 metFORMIN  mg tablet Commonly known as:  GLUCOPHAGE XR  
TAKE 2 TABLETS BY MOUTH EVERY DAY WITH DINNER  
  
 nystatin topical cream  
Commonly known as:  MYCOSTATIN Apply  to affected area two (2) times a day. predniSONE 20 mg tablet Commonly known as:  Stevie Fishman Take 1 Tab by mouth daily (with breakfast). traZODone 50 mg tablet Commonly known as:  DESYREL  
TAKE 1 TABLET BY MOUTH ONCE NIGHTLY  
  
 VITAMIN D3 1,000 unit tablet Generic drug:  cholecalciferol Take 2,000 Units by mouth daily. * Notice: This list has 4 medication(s) that are the same as other medications prescribed for you. Read the directions carefully, and ask your doctor or other care provider to review them with you. Prescriptions Sent to Pharmacy Refills  
 escitalopram oxalate (LEXAPRO) 20 mg tablet 2 Sig: TAKE ONE TABLET BY MOUTH EVERY DAY Class: Normal  
 Pharmacy: [facility] [address] [place] [address] Ph #: [phone]  
 buPROPion XL (WELLBUTRIN XL) 150 mg tablet 1 Sig: TAKE 1 TABLET BY MOUTH EVERY EVENING** TAKE WITH 300MG TABLETS FOR A TOTAL OF 450MG DAILY**  
 Class: Normal  
 Pharmacy: [facility] [address] [place] [address] Ph #: [phone]  
 buPROPion XL (WELLBUTRIN XL) 300 mg XL tablet 1 Sig: TAKE 1 TABLET BY MOUTH EVERY MORNING Class: Normal  
 Pharmacy: [facility] [address], [address] [place] [address] Ph #: [phone] We Performed the Following CBC WITH AUTOMATED DIFF [27337 CPT(R)] HEMOGLOBIN A1C WITH EAG [37110 CPT(R)] LIPID PANEL [41980 CPT(R)] METABOLIC PANEL, COMPREHENSIVE [77861 CPT(R)] MICROALBUMIN, UR, RAND W/ MICROALBUMIN/CREA RATIO [id number] CPT(R)] TSH 3RD GENERATION [93681 CPT(R)] Introducing hospitals & HEALTH SERVICES! Dear Charlena Fleischer: Thank you for requesting a [employer] account. Our records indicate that you already have an active [employer] account. You can access your account anytime at [email or web address] Did you know that you can access your hospital and ER discharge instructions at any time in [employer]? You can also review all of your test results from your hospital stay or ER visit. Additional Information If you have questions, please visit the Frequently Asked Questions section of the [employer] website at [email or web address] Remember, [employer] is NOT to be used for urgent needs. For medical emergencies, dial 911. Now available from your iPhone and Android! Please provide this summary of care documentation to your next provider. Your primary care clinician is listed as Pearl [address]. If you have any questions after today's visit, please call [phone]. no fever

## 2020-11-26 NOTE — PROGRESS NOTE ADULT - PROBLEM SELECTOR PLAN 1
neurologically at baseline (intermittent confusion)  hemodynamically stable off pressors and inotropic support  bradycardia resolved, A Flutter resolved, now in NSR on Lopressor 50 mg q6 hrs and PO Amio   continue aspirin for acute graft closure prophylaxis  continue statin for chronic graft patency prophylaxis  Encourage coughing and deep breathing exercises/incentive spirometry   nocturnal BiPAP as tolerated, pt refusing now  Ambulate with PT assist as tolerated later today

## 2020-11-26 NOTE — PROGRESS NOTE ADULT - ASSESSMENT
1) Martin -requiring RRT  HD TIW via RIJ TDC    2) chronic diastolic HFpEF, moderate valvular heart disease including moderate aortic and mitral regurgitation; s/p CABG, AVR/MVR;     3) Anemia of renal disease with superimposed Intra-op acute blood loss anemia and coagulopathy    4) Cardiogenic shock- resolved   1) Martin -requiring RRT  HD TIW via RIJ TDC  2) chronic diastolic HFpEF, moderate valvular heart disease including moderate aortic and mitral regurgitation; s/p CABG, AVR/MVR;   3) Anemia of renal disease with superimposed Intra-op acute blood loss anemia and coagulopathy  4) Cardiogenic shock- resolved      HD tomorrow  REINALDO with HD, monitor H/H

## 2020-11-26 NOTE — PROGRESS NOTE ADULT - SUBJECTIVE AND OBJECTIVE BOX
[facility] DIVISION OF KIDNEY DISEASES AND HYPERTENSION -- HEMODIALYSIS NOTE  --------------------------------------------------------------------------------  Chief Complaint: ESRD/Ongoing hemodialysis requirement    24 hour events/subjective:        PAST HISTORY  --------------------------------------------------------------------------------  No significant changes to PMH, PSH, FHx, SHx, unless otherwise noted    ALLERGIES & MEDICATIONS  --------------------------------------------------------------------------------  Allergies    penicillin (Unknown)    Intolerances      Standing Inpatient Medications  aMIOdarone    Tablet   Oral   aMIOdarone    Tablet 200 milliGRAM(s) Oral daily  ascorbic acid 500 milliGRAM(s) Oral daily  aspirin enteric coated 81 milliGRAM(s) Oral daily  atorvastatin 40 milliGRAM(s) Oral at bedtime  chlorhexidine 2% Cloths 1 Application(s) Topical daily  folic acid 1 milliGRAM(s) Oral daily  gabapentin 100 milliGRAM(s) Oral at bedtime  levETIRAcetam 750 milliGRAM(s) Oral two times a day  melatonin 5 milliGRAM(s) Oral at bedtime  methylphenidate 5 milliGRAM(s) Oral <User Schedule>  metoprolol tartrate 50 milliGRAM(s) Oral every 6 hours  multivitamin 1 Tablet(s) Oral daily  pantoprazole    Tablet 40 milliGRAM(s) Oral two times a day  phytonadione  IVPB 1 milliGRAM(s) IV Intermittent once  QUEtiapine 25 milliGRAM(s) Oral at bedtime  senna 2 Tablet(s) Oral at bedtime  sodium chloride 0.9%. 1000 milliLiter(s) IV Continuous <Continuous>    PRN Inpatient Medications  benzocaine 15 mG/menthol 3.6 mG (Sugar-Free) Lozenge 1 Lozenge Oral three times a day PRN  lidocaine   Patch 1 Patch Transdermal every 24 hours PRN      REVIEW OF SYSTEMS  --------------------------------------------------------------------------------  unable to obtain    VITALS/PHYSICAL EXAM  --------------------------------------------------------------------------------  T(C): 36.4 (11-26-20 @ 12:00), Max: 36.9 (11-25-20 @ 16:20)  HR: 87 (11-26-20 @ 12:00) (81 - 97)  BP: 102/65 (11-26-20 @ 12:00) (98/59 - 123/69)  RR: 20 (11-26-20 @ 12:00) (11 - 32)  SpO2: 93% (11-26-20 @ 12:00) (92% - 100%)  Wt(kg): --        11-25-20 @ 07:01  -  11-26-20 @ 07:00  --------------------------------------------------------  IN: 150 mL / OUT: 40 mL / NET: 110 mL    11-26-20 @ 07:01  -  11-26-20 @ 12:58  --------------------------------------------------------  IN: 200 mL / OUT: 0 mL / NET: 200 mL      Physical Exam:  	Gen: NAD, well-appearing  	HEENT: PERRL, supple neck,  	Pulm: CTA B/L  	CV: RRR, S1S2; no rub  	Abd: +BS, soft, nontender  	UE: Warm, intact strength; no asterixis  	LE: Warm, + edema  	Neuro: No focal deficits  	Psych: Normal affect and mood  	Skin: Warm, without rashes  	Vascular access:    LABS/STUDIES  --------------------------------------------------------------------------------              9.0    13.50 >-----------<  174      [11-26-20 @ 02:42]              28.7     136  |  96  |  63.0  ----------------------------<  99      [11-26-20 @ 02:42]  4.5   |  24.0  |  5.05        Ca     8.0     [11-26-20 @ 02:42]      Mg     2.1     [11-26-20 @ 02:42]    TPro  5.8  /  Alb  2.8  /  TBili  2.0  /  DBili  1.3  /  AST  48  /  ALT  34  /  AlkPhos  140  [11-24-20 @ 13:03]    PT/INR: PT 54.7 , INR 5.10       [11-26-20 @ 11:21]      Iron 29, TIBC 332, %sat 9      [11-03-20 @ 08:18]  Ferritin 230      [11-03-20 @ 08:18]  TSH 1.95      [11-03-20 @ 08:18]  Lipid: chol --, , HDL --, LDL --      [11-15-20 @ 02:40]    HBsAb <3.0      [11-07-20 @ 05:41]  HBsAg Nonreact      [11-07-20 @ 05:41]  HBcAb Nonreact      [11-07-20 @ 05:41]  HCV 0.07, Nonreact      [11-07-20 @ 05:41]   [facility] DIVISION OF KIDNEY DISEASES AND HYPERTENSION -- HEMODIALYSIS NOTE  --------------------------------------------------------------------------------  Chief Complaint: ESRD/Ongoing hemodialysis requirement    24 hour events/subjective:  no acute event      PAST HISTORY  --------------------------------------------------------------------------------  No significant changes to PMH, PSH, FHx, SHx, unless otherwise noted    ALLERGIES & MEDICATIONS  --------------------------------------------------------------------------------  Allergies    penicillin (Unknown)    Intolerances      Standing Inpatient Medications  aMIOdarone    Tablet   Oral   aMIOdarone    Tablet 200 milliGRAM(s) Oral daily  ascorbic acid 500 milliGRAM(s) Oral daily  aspirin enteric coated 81 milliGRAM(s) Oral daily  atorvastatin 40 milliGRAM(s) Oral at bedtime  chlorhexidine 2% Cloths 1 Application(s) Topical daily  folic acid 1 milliGRAM(s) Oral daily  gabapentin 100 milliGRAM(s) Oral at bedtime  levETIRAcetam 750 milliGRAM(s) Oral two times a day  melatonin 5 milliGRAM(s) Oral at bedtime  methylphenidate 5 milliGRAM(s) Oral <User Schedule>  metoprolol tartrate 50 milliGRAM(s) Oral every 6 hours  multivitamin 1 Tablet(s) Oral daily  pantoprazole    Tablet 40 milliGRAM(s) Oral two times a day  phytonadione  IVPB 1 milliGRAM(s) IV Intermittent once  QUEtiapine 25 milliGRAM(s) Oral at bedtime  senna 2 Tablet(s) Oral at bedtime  sodium chloride 0.9%. 1000 milliLiter(s) IV Continuous <Continuous>    PRN Inpatient Medications  benzocaine 15 mG/menthol 3.6 mG (Sugar-Free) Lozenge 1 Lozenge Oral three times a day PRN  lidocaine   Patch 1 Patch Transdermal every 24 hours PRN      REVIEW OF SYSTEMS  --------------------------------------------------------------------------------  unable to obtain    VITALS/PHYSICAL EXAM  --------------------------------------------------------------------------------  T(C): 36.4 (11-26-20 @ 12:00), Max: 36.9 (11-25-20 @ 16:20)  HR: 87 (11-26-20 @ 12:00) (81 - 97)  BP: 102/65 (11-26-20 @ 12:00) (98/59 - 123/69)  RR: 20 (11-26-20 @ 12:00) (11 - 32)  SpO2: 93% (11-26-20 @ 12:00) (92% - 100%)  Wt(kg): --        11-25-20 @ 07:01  -  11-26-20 @ 07:00  --------------------------------------------------------  IN: 150 mL / OUT: 40 mL / NET: 110 mL    11-26-20 @ 07:01  -  11-26-20 @ 12:58  --------------------------------------------------------  IN: 200 mL / OUT: 0 mL / NET: 200 mL      Physical Exam:  	Gen: NAD  	HEENT: PERRL, supple neck,  	Pulm: CTA B/L  	CV: RRR, S1S2; no rub  	Abd: +BS, soft, nontender  	UE: Warm, intact strength; no asterixis  	LE: Warm, no edema  	Neuro: No focal deficits  	Psych:  calm  	Skin: Warm, without rashes  	Vascular access: RIJ tunneled HD catheter  LABS/STUDIES  --------------------------------------------------------------------------------              9.0    13.50 >-----------<  174      [11-26-20 @ 02:42]              28.7     136  |  96  |  63.0  ----------------------------<  99      [11-26-20 @ 02:42]  4.5   |  24.0  |  5.05        Ca     8.0     [11-26-20 @ 02:42]      Mg     2.1     [11-26-20 @ 02:42]    TPro  5.8  /  Alb  2.8  /  TBili  2.0  /  DBili  1.3  /  AST  48  /  ALT  34  /  AlkPhos  140  [11-24-20 @ 13:03]    PT/INR: PT 54.7 , INR 5.10       [11-26-20 @ 11:21]      Iron 29, TIBC 332, %sat 9      [11-03-20 @ 08:18]  Ferritin 230      [11-03-20 @ 08:18]  TSH 1.95      [11-03-20 @ 08:18]  Lipid: chol --, , HDL --, LDL --      [11-15-20 @ 02:40]    HBsAb <3.0      [11-07-20 @ 05:41]  HBsAg Nonreact      [11-07-20 @ 05:41]  HBcAb Nonreact      [11-07-20 @ 05:41]  HCV 0.07, Nonreact      [11-07-20 @ 05:41]

## 2020-11-26 NOTE — PROGRESS NOTE ADULT - SUBJECTIVE AND OBJECTIVE BOX
[facility], [facility]                                                    [address] , [address], [place] [address]                                                         PHONE: [phone]    FAX: [phone] [address], [address], [place] [address]                                                 PHONE: [phone]    FAX: [phone]  *******************************************************************************    Overnight events/Subjective Assessment:    INTERPRETATION OF TELEMETRY (personally reviewed):    penicillin (Unknown)    MEDICATIONS  (STANDING):  aMIOdarone    Tablet   Oral   aMIOdarone    Tablet 200 milliGRAM(s) Oral daily  ascorbic acid 500 milliGRAM(s) Oral daily  aspirin enteric coated 81 milliGRAM(s) Oral daily  atorvastatin 40 milliGRAM(s) Oral at bedtime  chlorhexidine 2% Cloths 1 Application(s) Topical daily  folic acid 1 milliGRAM(s) Oral daily  gabapentin 100 milliGRAM(s) Oral at bedtime  levETIRAcetam 750 milliGRAM(s) Oral two times a day  melatonin 5 milliGRAM(s) Oral at bedtime  methylphenidate 5 milliGRAM(s) Oral <User Schedule>  metoprolol tartrate 50 milliGRAM(s) Oral every 6 hours  multivitamin 1 Tablet(s) Oral daily  pantoprazole    Tablet 40 milliGRAM(s) Oral two times a day  QUEtiapine 25 milliGRAM(s) Oral at bedtime  senna 2 Tablet(s) Oral at bedtime  sodium chloride 0.9%. 1000 milliLiter(s) (10 mL/Hr) IV Continuous <Continuous>    MEDICATIONS  (PRN):  benzocaine 15 mG/menthol 3.6 mG (Sugar-Free) Lozenge 1 Lozenge Oral three times a day PRN Sore Throat  bisacodyl Suppository 10 milliGRAM(s) Rectal daily PRN Constipation  lidocaine   Patch 1 Patch Transdermal every 24 hours PRN as needed  ondansetron Injectable 4 milliGRAM(s) IV Push every 6 hours PRN Nausea and/or Vomiting      Vital Signs Last 24 Hrs  T(C): 36.4 (26 Nov 2020 07:00), Max: 36.9 (25 Nov 2020 16:20)  T(F): 97.5 (26 Nov 2020 07:00), Max: 98.4 (25 Nov 2020 16:20)  HR: 90 (26 Nov 2020 10:00) (81 - 97)  BP: 105/66 (26 Nov 2020 10:00) (98/59 - 123/69)  BP(mean): 79 (26 Nov 2020 10:00) (71 - 96)  RR: 19 (26 Nov 2020 10:00) (11 - 32)  SpO2: 98% (26 Nov 2020 10:00) (92% - 100%)    I&O's Detail    25 Nov 2020 07:01  -  26 Nov 2020 07:00  --------------------------------------------------------  IN:    Oral Fluid: 150 mL  Total IN: 150 mL    OUT:    Chest Tube (mL): 40 mL  Total OUT: 40 mL    Total NET: 110 mL        I&O's Summary    25 Nov 2020 07:01  -  26 Nov 2020 07:00  --------------------------------------------------------  IN: 150 mL / OUT: 40 mL / NET: 110 mL            PHYSICAL EXAM:  General: Appears well developed, well nourished, no acute distress  HEENT: Head: normocephalic, atraumatic  Eyes: Pupils equal and reactive  Neck: Supple, no carotid bruit, no JVD, no HJR  CARDIOVASCULAR: Normal S1 and S2, no murmur, rub, or gallop  LUNGS: Clear to auscultation bilaterally, no rales, rhonchi or wheeze  ABDOMEN: Soft, nontender, non-distended, positive bowel sounds, no mass or bruit  EXTREMITIES: No edema, distal pulses WNL  SKIN: Warm and dry with normal turgor  NEURO: Alert & oriented x 3, grossly intact  PSYCH: normal mood and affect        LABS:                        9.0    13.50 )-----------( 174      ( 26 Nov 2020 02:42 )             28.7     11-26    136  |  96<L>  |  63.0<H>  ----------------------------<  99  4.5   |  24.0  |  5.05<H>    Ca    8.0<L>      26 Nov 2020 02:42  Mg     2.1     11-26    TPro  5.8<L>  /  Alb  2.8<L>  /  TBili  2.0  /  DBili  1.3<H>  /  AST  48<H>  /  ALT  34  /  AlkPhos  140<H>  11-24        PT/INR - ( 26 Nov 2020 06:07 )   PT: 37.8 sec;   INR: 3.46 ratio           serum  Lipids:     Thyroid Stimulating Hormone, Serum: 1.95 uIU/mL (11-03 @ 08:18)      RADIOLOGY & ADDITIONAL STUDIES:    ECG:    ECHO:    STRESS TEST:    CARDIAC CATHETERIZATION:    ASSESSMENT AND PLAN:  In summary, JONATHAN GIBSON is a 60y Male with past medical history significant for CABG, LBBB, HD, Aortic dissection, DM, HTN, HL substance abuse slowly improving  - cont current meds/care  - eventual PT/OT  - CXR reviewed no new abn  - meds reviewed      Ramon Quach DO

## 2020-11-26 NOTE — PROGRESS NOTE ADULT - SUBJECTIVE AND OBJECTIVE BOX
Subjective:  59yo M resting comfortably, refusing bipap nightly.      HPI:  Patient is a 60 yoM with PMH of CAD s/p stent in LAD, leaky aortic valve, uncontrolled bp, thoracic aortic dissection requiring emergent surgery, CVA with left sided sensory loss, colostomy for bowel ischemia s/p reversal, psoriasis on Humira injections presents from home with sob and le edema. Patient states the past several weeks, he has not been taking his medications because he forgot. Patient states the sob has gotten worse over the past few days and also associated with chest pressure. Patient also hasnt seen his cardiologist in several months. Patient worked up in the er and had an elevated bnp and htn urgency and eric. Patient seen at bedside and states feeling a little better.  (02 Nov 2020 14:32)          PAST MEDICAL & SURGICAL HISTORY:  CVA (cerebral vascular accident)    CHF (congestive heart failure)    H/O colectomy    Aorta disorder            MEDICATIONS  (STANDING):  aMIOdarone    Tablet   Oral   aMIOdarone    Tablet 200 milliGRAM(s) Oral daily  ascorbic acid 500 milliGRAM(s) Oral daily  aspirin enteric coated 81 milliGRAM(s) Oral daily  atorvastatin 40 milliGRAM(s) Oral at bedtime  chlorhexidine 2% Cloths 1 Application(s) Topical daily  folic acid 1 milliGRAM(s) Oral daily  gabapentin 100 milliGRAM(s) Oral at bedtime  levETIRAcetam 750 milliGRAM(s) Oral two times a day  melatonin 5 milliGRAM(s) Oral at bedtime  methylphenidate 5 milliGRAM(s) Oral <User Schedule>  metoprolol tartrate 50 milliGRAM(s) Oral every 6 hours  multivitamin 1 Tablet(s) Oral daily  pantoprazole    Tablet 40 milliGRAM(s) Oral two times a day  QUEtiapine 25 milliGRAM(s) Oral at bedtime  senna 2 Tablet(s) Oral at bedtime  sodium chloride 0.9%. 1000 milliLiter(s) (10 mL/Hr) IV Continuous <Continuous>    MEDICATIONS  (PRN):  benzocaine 15 mG/menthol 3.6 mG (Sugar-Free) Lozenge 1 Lozenge Oral three times a day PRN Sore Throat  bisacodyl Suppository 10 milliGRAM(s) Rectal daily PRN Constipation  lidocaine   Patch 1 Patch Transdermal every 24 hours PRN as needed  ondansetron Injectable 4 milliGRAM(s) IV Push every 6 hours PRN Nausea and/or Vomiting          Allergies    penicillin (Unknown)    Intolerances          WEIGHTS:  Daily     Daily   Admit Wt: Drug Dosing Weight  Height (cm): 165.1 (02 Nov 2020 08:55)  Weight (kg): 112 (17 Nov 2020 12:00)  BMI (kg/m2): 41.1 (17 Nov 2020 12:00)  BSA (m2): 2.16 (17 Nov 2020 12:00)  I&O's Summary    24 Nov 2020 07:01  -  25 Nov 2020 07:00  --------------------------------------------------------  IN: 500 mL / OUT: 355 mL / NET: 145 mL    25 Nov 2020 07:01  -  26 Nov 2020 03:34  --------------------------------------------------------  IN: 0 mL / OUT: 40 mL / NET: -40 mL        VITAL SIGNS:  ICU Vital Signs Last 24 Hrs  T(C): 36.4 (26 Nov 2020 00:57), Max: 36.9 (25 Nov 2020 07:00)  T(F): 97.5 (26 Nov 2020 00:57), Max: 98.5 (25 Nov 2020 07:00)  HR: 91 (26 Nov 2020 03:00) (81 - 97)  BP: 108/62 (26 Nov 2020 03:00) (100/64 - 123/69)  BP(mean): 79 (26 Nov 2020 03:00) (71 - 92)  ABP: --  ABP(mean): --  RR: 14 (26 Nov 2020 03:00) (11 - 39)  SpO2: 99% (26 Nov 2020 03:00) (92% - 100%)        All laboratory results, radiology and medications reviewed.    LABS:  11-26    136  |  96<L>  |  63.0<H>  ----------------------------<  99  4.5   |  24.0  |  5.05<H>    Ca    8.0<L>      26 Nov 2020 02:42  Mg     2.1     11-26    TPro  5.8<L>  /  Alb  2.8<L>  /  TBili  2.0  /  DBili  1.3<H>  /  AST  48<H>  /  ALT  34  /  AlkPhos  140<H>  11-24                                 9.0    13.50 )-----------( 174      ( 26 Nov 2020 02:42 )             28.7          PT/INR - ( 25 Nov 2020 03:04 )   PT: 20.4 sec;   INR: 1.81 ratio             ABG - ( 25 Nov 2020 17:30 )  pH, Arterial: 7.45  pH, Blood: x     /  pCO2: 37    /  pO2: 70    / HCO3: 26    / Base Excess: 1.3   /  SaO2: 96                    CAPILLARY BLOOD GLUCOSE                 PHYSICAL EXAM:  General:  well nourished, no acute distress  Neurology:  alert and oriented X 4, nonfocal, no gross deficits  Respiratory:  clear to auscultation bilaterally  CV:  regular rate and rhythm, normal S1 S2  Abdomen:  soft, nontender, nondistended, positive bowel sounds  Extremities:  warm, well perfused, no edema +DP pulses  Incisions:  midline sternal incision, c/d/i, sternum stable

## 2020-11-27 LAB
ANION GAP SERPL CALC-SCNC: 17 MMOL/L — SIGNIFICANT CHANGE UP (ref 5–17)
BUN SERPL-MCNC: 28 MG/DL — HIGH (ref 8–20)
BUN SERPL-MCNC: 74 MG/DL — HIGH (ref 8–20)
CALCIUM SERPL-MCNC: 7.7 MG/DL — LOW (ref 8.6–10.2)
CHLORIDE SERPL-SCNC: 95 MMOL/L — LOW (ref 98–107)
CO2 SERPL-SCNC: 23 MMOL/L — SIGNIFICANT CHANGE UP (ref 22–29)
CREAT SERPL-MCNC: 5.63 MG/DL — HIGH (ref 0.5–1.3)
GLUCOSE SERPL-MCNC: 96 MG/DL — SIGNIFICANT CHANGE UP (ref 70–99)
HCT VFR BLD CALC: 27.7 % — LOW (ref 39–50)
HGB BLD-MCNC: 8.8 G/DL — LOW (ref 13–17)
INR BLD: 3.59 RATIO — HIGH (ref 0.88–1.16)
INR BLD: 3.79 RATIO — HIGH (ref 0.88–1.16)
MAGNESIUM SERPL-MCNC: 2.1 MG/DL — SIGNIFICANT CHANGE UP (ref 1.6–2.6)
MCHC RBC-ENTMCNC: 30.2 PG — SIGNIFICANT CHANGE UP (ref 27–34)
MCHC RBC-ENTMCNC: 31.8 GM/DL — LOW (ref 32–36)
MCV RBC AUTO: 95.2 FL — SIGNIFICANT CHANGE UP (ref 80–100)
PLATELET # BLD AUTO: 188 K/UL — SIGNIFICANT CHANGE UP (ref 150–400)
POTASSIUM SERPL-MCNC: 4.6 MMOL/L — SIGNIFICANT CHANGE UP (ref 3.5–5.3)
POTASSIUM SERPL-SCNC: 4.6 MMOL/L — SIGNIFICANT CHANGE UP (ref 3.5–5.3)
PREALB SERPL-MCNC: 8 MG/DL — LOW (ref 18–38)
PROTHROM AB SERPL-ACNC: 39.2 SEC — HIGH (ref 10.6–13.6)
PROTHROM AB SERPL-ACNC: 41.3 SEC — HIGH (ref 10.6–13.6)
RBC # BLD: 2.91 M/UL — LOW (ref 4.2–5.8)
RBC # FLD: 14.9 % — HIGH (ref 10.3–14.5)
SODIUM SERPL-SCNC: 135 MMOL/L — SIGNIFICANT CHANGE UP (ref 135–145)
WBC # BLD: 13.9 K/UL — HIGH (ref 3.8–10.5)
WBC # FLD AUTO: 13.9 K/UL — HIGH (ref 3.8–10.5)

## 2020-11-27 PROCEDURE — 99233 SBSQ HOSP IP/OBS HIGH 50: CPT

## 2020-11-27 PROCEDURE — 71045 X-RAY EXAM CHEST 1 VIEW: CPT | Mod: 26

## 2020-11-27 PROCEDURE — 90937 HEMODIALYSIS REPEATED EVAL: CPT

## 2020-11-27 RX ORDER — ERYTHROPOIETIN 10000 [IU]/ML
10000 INJECTION, SOLUTION INTRAVENOUS; SUBCUTANEOUS
Refills: 0 | Status: DISCONTINUED | OUTPATIENT
Start: 2020-11-27 | End: 2020-11-30

## 2020-11-27 RX ORDER — CALCIUM GLUCONATE 100 MG/ML
2 VIAL (ML) INTRAVENOUS ONCE
Refills: 0 | Status: COMPLETED | OUTPATIENT
Start: 2020-11-27 | End: 2020-11-27

## 2020-11-27 RX ORDER — ACETAMINOPHEN 500 MG
650 TABLET ORAL EVERY 6 HOURS
Refills: 0 | Status: DISCONTINUED | OUTPATIENT
Start: 2020-11-27 | End: 2020-11-30

## 2020-11-27 RX ORDER — SODIUM CHLORIDE 9 MG/ML
3 INJECTION INTRAMUSCULAR; INTRAVENOUS; SUBCUTANEOUS EVERY 8 HOURS
Refills: 0 | Status: DISCONTINUED | OUTPATIENT
Start: 2020-11-27 | End: 2020-11-30

## 2020-11-27 RX ADMIN — ATORVASTATIN CALCIUM 40 MILLIGRAM(S): 80 TABLET, FILM COATED ORAL at 23:19

## 2020-11-27 RX ADMIN — Medication 50 MILLIGRAM(S): at 23:20

## 2020-11-27 RX ADMIN — Medication 81 MILLIGRAM(S): at 12:10

## 2020-11-27 RX ADMIN — SODIUM CHLORIDE 3 MILLILITER(S): 9 INJECTION INTRAMUSCULAR; INTRAVENOUS; SUBCUTANEOUS at 21:00

## 2020-11-27 RX ADMIN — LEVETIRACETAM 750 MILLIGRAM(S): 250 TABLET, FILM COATED ORAL at 06:30

## 2020-11-27 RX ADMIN — PANTOPRAZOLE SODIUM 40 MILLIGRAM(S): 20 TABLET, DELAYED RELEASE ORAL at 18:59

## 2020-11-27 RX ADMIN — Medication 5 MILLIGRAM(S): at 23:19

## 2020-11-27 RX ADMIN — Medication 5 MILLIGRAM(S): at 11:52

## 2020-11-27 RX ADMIN — Medication 50 MILLIGRAM(S): at 11:52

## 2020-11-27 RX ADMIN — AMIODARONE HYDROCHLORIDE 200 MILLIGRAM(S): 400 TABLET ORAL at 06:29

## 2020-11-27 RX ADMIN — GABAPENTIN 100 MILLIGRAM(S): 400 CAPSULE ORAL at 23:19

## 2020-11-27 RX ADMIN — CHLORHEXIDINE GLUCONATE 1 APPLICATION(S): 213 SOLUTION TOPICAL at 11:52

## 2020-11-27 RX ADMIN — Medication 5 MILLIGRAM(S): at 06:29

## 2020-11-27 RX ADMIN — Medication 650 MILLIGRAM(S): at 23:19

## 2020-11-27 RX ADMIN — LEVETIRACETAM 750 MILLIGRAM(S): 250 TABLET, FILM COATED ORAL at 18:59

## 2020-11-27 RX ADMIN — Medication 1 MILLIGRAM(S): at 11:52

## 2020-11-27 RX ADMIN — Medication 1 TABLET(S): at 11:52

## 2020-11-27 RX ADMIN — QUETIAPINE FUMARATE 25 MILLIGRAM(S): 200 TABLET, FILM COATED ORAL at 23:19

## 2020-11-27 RX ADMIN — Medication 500 MILLIGRAM(S): at 11:52

## 2020-11-27 RX ADMIN — Medication 200 GRAM(S): at 08:45

## 2020-11-27 RX ADMIN — Medication 50 MILLIGRAM(S): at 18:59

## 2020-11-27 RX ADMIN — SODIUM CHLORIDE 3 MILLILITER(S): 9 INJECTION INTRAMUSCULAR; INTRAVENOUS; SUBCUTANEOUS at 13:18

## 2020-11-27 RX ADMIN — ERYTHROPOIETIN 10000 UNIT(S): 10000 INJECTION, SOLUTION INTRAVENOUS; SUBCUTANEOUS at 11:38

## 2020-11-27 RX ADMIN — PANTOPRAZOLE SODIUM 40 MILLIGRAM(S): 20 TABLET, DELAYED RELEASE ORAL at 06:30

## 2020-11-27 RX ADMIN — Medication 50 MILLIGRAM(S): at 06:29

## 2020-11-27 NOTE — PROGRESS NOTE ADULT - NEGATIVE GASTROINTESTINAL SYMPTOMS
no constipation/no diarrhea/no nausea/no vomiting
no diarrhea/no vomiting/no nausea/no constipation
no vomiting/no diarrhea/no constipation/no nausea
no vomiting/no diarrhea/no nausea/no constipation

## 2020-11-27 NOTE — PROGRESS NOTE ADULT - PROBLEM SELECTOR PLAN 1
neurologically at baseline (intermittent confusion)  hemodynamically stable off pressors and inotropic support  bradycardia resolved, A Flutter resolved, now in NSR on Lopressor 50 mg q6 hrs and PO Amio   continue aspirin for acute graft closure prophylaxis  continue statin for chronic graft patency prophylaxis  Encourage coughing and deep breathing exercises/incentive spirometry   nocturnal BiPAP cancelled, pt was refusing nightly  Ambulate with PT assist as tolerated later today

## 2020-11-27 NOTE — PROGRESS NOTE ADULT - SUBJECTIVE AND OBJECTIVE BOX
[facility], [facility]                                          [address] , [address], [place] [address]                                               PHONE: [phone]    FAX: [phone] [address], [address], [place] [address]                                       PHONE: [phone]    FAX: [phone]  *******************************************************************************    Overnight events/Subjective Assessment: No overnight cardiac events.  No CP/palp/SOB.  C/o fatigue    INTERPRETATION OF TELEMETRY (personally reviewed): sinus rhythm       MEDICATIONS  (STANDING):  aMIOdarone    Tablet   Oral   aMIOdarone    Tablet 200 milliGRAM(s) Oral daily  ascorbic acid 500 milliGRAM(s) Oral daily  aspirin enteric coated 81 milliGRAM(s) Oral daily  atorvastatin 40 milliGRAM(s) Oral at bedtime  chlorhexidine 2% Cloths 1 Application(s) Topical daily  folic acid 1 milliGRAM(s) Oral daily  gabapentin 100 milliGRAM(s) Oral at bedtime  levETIRAcetam 750 milliGRAM(s) Oral two times a day  melatonin 5 milliGRAM(s) Oral at bedtime  methylphenidate 5 milliGRAM(s) Oral <User Schedule>  metoprolol tartrate 50 milliGRAM(s) Oral every 6 hours  multivitamin 1 Tablet(s) Oral daily  pantoprazole    Tablet 40 milliGRAM(s) Oral two times a day  QUEtiapine 25 milliGRAM(s) Oral at bedtime  senna 2 Tablet(s) Oral at bedtime  sodium chloride 0.9% lock flush 3 milliLiter(s) IV Push every 8 hours    MEDICATIONS  (PRN):  benzocaine 15 mG/menthol 3.6 mG (Sugar-Free) Lozenge 1 Lozenge Oral three times a day PRN Sore Throat  lidocaine   Patch 1 Patch Transdermal every 24 hours PRN as needed      ICU Vital Signs Last 24 Hrs  T(C): 36.8 (27 Nov 2020 08:00), Max: 36.9 (26 Nov 2020 16:05)  T(F): 98.3 (27 Nov 2020 08:00), Max: 98.5 (26 Nov 2020 16:05)  HR: 92 (27 Nov 2020 08:00) (87 - 96)  BP: 96/68 (27 Nov 2020 08:00) (93/63 - 122/73)  BP(mean): 76 (27 Nov 2020 08:00) (70 - 92)  RR: 17 (27 Nov 2020 08:00) (16 - 24)  SpO2: 96% (27 Nov 2020 08:00) (92% - 100%)      PHYSICAL EXAM:  General: Appears well developed, well nourished, no acute distress  HEENT: Head: normocephalic, atraumatic  Eyes: Pupils equal and reactive  Neck: Supple, no carotid bruit, no JVD, no HJR  CARDIOVASCULAR: Tachycardic, regular, Normal S1 and S2, no murmur, rub, or gallop  LUNGS: Diminished at bases, otherwise clear to auscultation bilaterally, no rales, rhonchi or wheeze  CHEST: Clean sternotomy dressing  ABDOMEN: Soft, nontender, non-distended, positive bowel sounds, no mass or bruit  EXTREMITIES: Trace LE edema, presacral edema noted, distal pulses WNL, dressings c/d/i  SKIN: Warm and dry with normal turgor  NEURO: Alert & oriented x 3, grossly intact  PSYCH: normal mood and affect          LABS:               9.3    13.72 )-----------( 170      ( 25 Nov 2020 03:04 )             29.3       11-25    138  |  97<L>  |  42.0<H>  ----------------------------<  99  4.5   |  24.0  |  4.36<H>    Ca    8.4<L>      25 Nov 2020 03:04  Mg     2.2     11-25    TPro  5.8<L>  /  Alb  2.8<L>  /  TBili  2.0  /  DBili  1.3<H>  /  AST  48<H>  /  ALT  34  /  AlkPhos  140<H>  11-24      ASSESSMENT AND PLAN:  In summary, JONATHAN GIBSON is a 60M s/p re-op CABGx2 (SVG->OM/RPDA), bioAVR & bioMVR 11/13/20, post-op junctional rhythm resolved, now AT/AFlutter 100s, LBBB, ARF/CRI now on HD (s/p permacath 11/20/20), anemia s/p PRBCs, volume overload improving, h/o CAD s/p stent LAD, normal LV fxn, type A dissection repair 2010, known chronic type B dissection, chronic diastolic CHF, CVA w/ L-sided sensory loss, DM, HTN, HL, psoriasis on Humira, likely COVID in 9/20 (+ Ab), prior cocaine/opioid/benzo/EtoH abuse    - Patient improving clinically post-operatively, s/p reop AVR 25mm CE [id number], MVR 31mm Magna Ease, CABGx2 (DGV-OM, RPDA) with Dr. Butler 11/13/20.  - Cardiac cath 11/10/20: severe 2 vessel CAD.  Chronically occluded proximal RCA with collaterals from the LCA.  EF 50%.  Elevated LVEDP.  - Echocardiogram 11/4/20 EF 50-55%, grade II diastolic dysfunction, mildly reduced RV systolic function, severe LAE, mild KOLTON, mod-severe MR and AI, mod TR, dilated aortic root @ 4.2 cm  - Junctional rhythm post-operatively resolved, no events overnight, LBBB present.  No indication for a PPM at this time.  Patient was evaluated by Dr. Raygoza (EP).  Amiodarone load in place with 400 tid x12 doses then 200 daily.  Agree with uptitration of Lopressor to 50 q8h.  Paroxsymal atrial tachycardia noted now back to sinus rhythm.  Continue to monitor.   - ASA 81 daily in place  - Lipitor 40 daily in place  - ARF/CRI now on HD, s/p permacath.  Nephrology follow-up.  - Patient with AFlutter with increased [id number] risk score currently maintained on oral AC with Coumadin.  Dosing per primary team.  Goal INR 2-3.  - DM.  Glycemic control per primary team.  - Valvular heart disease. s/p reoperative AVR and now MVR. SBE prophylaxis for procedures that entail bacteremia.  - Seizure disorder. Keppra in place.  - Low platelets, improving.  No evidence of active bleeding. Further management as per CTS.  - Pain management as per CTS.   - Continue to encourage out of bed/incentive spirometer.

## 2020-11-27 NOTE — PROGRESS NOTE ADULT - CARDIOVASCULAR
negative
not applicable
not applicable
details…
detailed exam

## 2020-11-27 NOTE — PROGRESS NOTE ADULT - SUBJECTIVE AND OBJECTIVE BOX
Patient making progress with PT.  Currently undergoing HD.  Discussed with PT on AR.     REVIEW OF SYSTEMS  Constitutional - No fever,  +fatigue  Neurological - +loss of strength    FUNCTIONAL PROGRESS  11/27  Sit-Stand Transfer Training  Sit-to-Stand Transfer Training Rehab Potential: good, to achieve stated therapy goals  Transfer Training Sit-to-Stand Transfer: moderate assist (50% patient effort);  1 person assist;  verbal cues  Transfer Training Stand-to-Sit Transfer: contact guard;  1 person assist;  verbal cues  Sit-to-Stand Transfer Training Transfer Safety Analysis: impaired balance;  decreased strength    Gait Training  Gait Training Rehab Potential: good, to achieve stated therapy goals  Gait Training: minimum assist (75% patient effort);  1 person assist;  verbal cues;  rolling walker;  20 feet  Gait Analysis: 2-point gait   decreased step length;  decreased hip/knee flexion;  impaired balance;  decreased strength;  20 feet;  rolling walker      VITALS  T(C): 36 (11-27-20 @ 10:00), Max: 36.9 (11-26-20 @ 16:05)  HR: 91 (11-27-20 @ 10:00) (87 - 96)  BP: 101/61 (11-27-20 @ 10:00) (93/63 - 122/73)  RR: 17 (11-27-20 @ 10:00) (16 - 24)  SpO2: 95% (11-27-20 @ 10:00) (92% - 100%)  Wt(kg): --    MEDICATIONS   aMIOdarone    Tablet     aMIOdarone    Tablet 200 milliGRAM(s) daily  ascorbic acid 500 milliGRAM(s) daily  aspirin enteric coated 81 milliGRAM(s) daily  atorvastatin 40 milliGRAM(s) at bedtime  benzocaine 15 mG/menthol 3.6 mG (Sugar-Free) Lozenge 1 Lozenge three times a day PRN  chlorhexidine 2% Cloths 1 Application(s) daily  folic acid 1 milliGRAM(s) daily  gabapentin 100 milliGRAM(s) at bedtime  levETIRAcetam 750 milliGRAM(s) two times a day  lidocaine   Patch 1 Patch every 24 hours PRN  melatonin 5 milliGRAM(s) at bedtime  methylphenidate 5 milliGRAM(s) <User Schedule>  metoprolol tartrate 50 milliGRAM(s) every 6 hours  multivitamin 1 Tablet(s) daily  pantoprazole    Tablet 40 milliGRAM(s) two times a day  QUEtiapine 25 milliGRAM(s) at bedtime  senna 2 Tablet(s) at bedtime  sodium chloride 0.9% lock flush 3 milliLiter(s) every 8 hours      RECENT LABS/IMAGING                          8.8    13.90 )-----------( 188      ( 27 Nov 2020 03:11 )             27.7     11-27    135  |  95<L>  |  74.0<H>  ----------------------------<  96  4.6   |  23.0  |  5.63<H>    Ca    7.7<L>      27 Nov 2020 03:11  Mg     2.1     11-27      PT/INR - ( 27 Nov 2020 03:11 )   PT: 39.2 sec;   INR: 3.59 ratio                     Sit-Stand Transfer Training  Sit-to-Stand Transfer Training Rehab Potential: good, to achieve stated therapy goals  Transfer Training Sit-to-Stand Transfer: moderate assist (50% patient effort);  1 person assist;  verbal cues;  rolling walker  Transfer Training Stand-to-Sit Transfer: minimum assist (75% patient effort);  1 person assist;  verbal cues;  rolling walker  Sit-to-Stand Transfer Training Transfer Safety Analysis: decreased balance;  impaired balance;  decreased strength;  rolling walker    Gait Training  Gait Training Rehab Potential: good, to achieve stated therapy goals  Gait Training: minimum assist (75% patient effort);  1 person + 1 person to manage equipment;  chair follow;  rolling walker;  10 feet  Gait Analysis: 2-point gait   impaired balance;  decreased strength;  10 feet;  rolling walker      CTA NECK/BRAIN 11/12 - 70% stenosis in the right carotid bulb. Aortic arch dissection extending into the great vessels, as above. Motion artifacts limits evaluation of the left common carotid artery.    CXR 11/16 - Retrocardiac opacity, likely atelectasis.    CXR 11/19 - Status post open heart surgery. No evidence of active chest pathology. Catheters and/or tubes in place    ABD XR 11/23 - Diffuse air-filled bowel ileus. Follow-up upright or lateral decubitus KUB recommended to evaluate for static versus differential air-fluid levels    TTE 11/23 - Summary:   1. Left ventricular ejection fraction, by visual estimation, is 40%%.   2. Technically adequate study.   3. Moderately decreased global left ventricular systolic function.   4. Multiple left ventricular regional wall motion abnormalities exist. See wall motion findings.   5. The left ventricular diastolic function could not be assessed in this study.   6. Moderately reduced RV systolic function.   7. Trivial pericardial effusion.   8. A bioprosthetic valve is present in the mitral position.   9. Trace mitral valve regurgitation.  10. Moderate tricuspid regurgitation.  11. Bioprosthesis in the aortic position.  12. Estimated pulmonary artery systolic pressure is 40.9 mmHg assuming a right atrial pressure of 8 mmHg, which is consistent with mild pulmonary hypertension.  13. Endocardial visualization was enhanced with intravenous echo contrast.  14. Mitral valve mean gradient is 2.2 mmHg consistent with mild mitral stenosis.    CXR 11/26 - Cardiomegaly. No infiltrates.  ----------------------------------------------------------------------------------------  PHYSICAL EXAM  Constitutional - NAD, Comfortable  Extremities - Diffuse mild edema   Neurologic Exam -                    Cognitive - Awake, Alert, AAO to self, PART of situation     Communication - Fluent, +dysarthria     Motor -  No apparent focal deficits      Sensory - Decreased to the left      Coordination - FTN impaired  Psychiatric - Mood depressed, anxious  ----------------------------------------------------------------------------------------  ASSESSMENT/PLAN  60yMale with functional deficits after debility related to severe MR and AR s/p AVR/MVR   CAD s/p CABGx2, Old CVA - Coumadin, ASA, Lipitor  HTN - Lopressor  AFIB - Amiodarone   Ileus - Reglan  Pain - Tylenol, Lidoderm, Neurontin  Sleep - Melatonin  Mood - Seroquel  Seizures - Keppra  DVT PPX - SCDs  Rehab - Medically being optimized. Recommend ACUTE inpatient rehabilitation for the functional deficits consisting of 3 hours of therapy/day & 24 hour RN/daily PMR physician for comorbid medical management. Will continue to follow for ongoing rehab needs and recommendations. Patient will be able to tolerate 3 hours a day.    Continue bedside therapy as well as OOB throughout the day with mobilization throughout the day with staff to maintain cardiopulmonary function and prevention of secondary complications related to debility.     Discussed with rehab clinical team.

## 2020-11-27 NOTE — PROGRESS NOTE ADULT - SUBJECTIVE AND OBJECTIVE BOX
[facility] DIVISION OF KIDNEY DISEASES AND HYPERTENSION -- HEMODIALYSIS NOTE  --------------------------------------------------------------------------------  Chief Complaint:  Deven/Ongoing hemodialysis requirement    24 hour events/subjective:  no acute event        PAST HISTORY  --------------------------------------------------------------------------------  No significant changes to PMH, PSH, FHx, SHx, unless otherwise noted    ALLERGIES & MEDICATIONS  --------------------------------------------------------------------------------  Allergies    penicillin (Unknown)    Intolerances      Standing Inpatient Medications  aMIOdarone    Tablet   Oral   aMIOdarone    Tablet 200 milliGRAM(s) Oral daily  ascorbic acid 500 milliGRAM(s) Oral daily  aspirin enteric coated 81 milliGRAM(s) Oral daily  atorvastatin 40 milliGRAM(s) Oral at bedtime  chlorhexidine 2% Cloths 1 Application(s) Topical daily  folic acid 1 milliGRAM(s) Oral daily  gabapentin 100 milliGRAM(s) Oral at bedtime  levETIRAcetam 750 milliGRAM(s) Oral two times a day  melatonin 5 milliGRAM(s) Oral at bedtime  methylphenidate 5 milliGRAM(s) Oral <User Schedule>  metoprolol tartrate 50 milliGRAM(s) Oral every 6 hours  multivitamin 1 Tablet(s) Oral daily  pantoprazole    Tablet 40 milliGRAM(s) Oral two times a day  QUEtiapine 25 milliGRAM(s) Oral at bedtime  senna 2 Tablet(s) Oral at bedtime  sodium chloride 0.9% lock flush 3 milliLiter(s) IV Push every 8 hours    PRN Inpatient Medications  benzocaine 15 mG/menthol 3.6 mG (Sugar-Free) Lozenge 1 Lozenge Oral three times a day PRN  lidocaine   Patch 1 Patch Transdermal every 24 hours PRN      REVIEW OF SYSTEMS  --------------------------------------------------------------------------------  Gen: No weight changes, fatigue, fevers/chills, weakness  Skin: No rashes  Head/Eyes/Ears/Mouth: No headache  Respiratory: No dyspnea, cough,  CV: No chest pain, orthopnea  GI: No abdominal pain, diarrhea, constipation, nausea, vomiting,  MSK: No joint pain  Neuro: No dizziness/lightheadedness, weakness  Heme: No bleeding  Psych: No significant nervousness, anxiety, stress, depression    All other systems were reviewed and are negative, except as noted.    VITALS/PHYSICAL EXAM  --------------------------------------------------------------------------------  T(C): 36 (11-27-20 @ 10:00), Max: 36.9 (11-26-20 @ 16:05)  HR: 91 (11-27-20 @ 10:00) (87 - 96)  BP: 101/61 (11-27-20 @ 10:00) (93/63 - 122/73)  RR: 17 (11-27-20 @ 10:00) (16 - 24)  SpO2: 95% (11-27-20 @ 10:00) (92% - 100%)  Wt(kg): --        11-26-20 @ 07:01  -  11-27-20 @ 07:00  --------------------------------------------------------  IN: 450 mL / OUT: 340 mL / NET: 110 mL      Physical Exam:  	Gen: NAD, well-appearing  	HEENT: PERRL, supple neck,  	Pulm: CTA B/L  	CV: RRR, S1S2; no rub  	Abd: +BS, soft, nontender  	UE: Warm, intact strength; no asterixis  	LE: Warm, no edema  	Neuro: No focal deficits  	Psych: Normal affect and mood  	Skin: Warm, without rashes  	Vascular access: RI tunneled HD catheter    LABS/STUDIES  --------------------------------------------------------------------------------              8.8    13.90 >-----------<  188      [11-27-20 @ 03:11]              27.7     135  |  95  |  74.0  ----------------------------<  96      [11-27-20 @ 03:11]  4.6   |  23.0  |  5.63        Ca     7.7     [11-27-20 @ 03:11]      Mg     2.1     [11-27-20 @ 03:11]      PT/INR: PT 39.2 , INR 3.59       [11-27-20 @ 03:11]      Iron 29, TIBC 332, %sat 9      [11-03-20 @ 08:18]  Ferritin 230      [11-03-20 @ 08:18]  TSH 1.95      [11-03-20 @ 08:18]  Lipid: chol --, , HDL --, LDL --      [11-15-20 @ 02:40]    HBsAb <3.0      [11-07-20 @ 05:41]  HBsAg Nonreact      [11-07-20 @ 05:41]  HBcAb Nonreact      [11-07-20 @ 05:41]  HCV 0.07, Nonreact      [11-07-20 @ 05:41]

## 2020-11-27 NOTE — PROGRESS NOTE ADULT - GASTROINTESTINAL
negative
not applicable
not applicable
details…
negative
details…
details…
detailed exam

## 2020-11-27 NOTE — PROGRESS NOTE ADULT - SUBJECTIVE AND OBJECTIVE BOX
Subjective:  61yo M resting comfortable, no c/o pain.      HPI:  Patient is a 60 yoM with PMH of CAD s/p stent in LAD, leaky aortic valve, uncontrolled bp, thoracic aortic dissection requiring emergent surgery, CVA with left sided sensory loss, colostomy for bowel ischemia s/p reversal, psoriasis on Humira injections presents from home with sob and le edema. Patient states the past several weeks, he has not been taking his medications because he forgot. Patient states the sob has gotten worse over the past few days and also associated with chest pressure. Patient also hasnt seen his cardiologist in several months. Patient worked up in the er and had an elevated bnp and htn urgency and eric. Patient seen at bedside and states feeling a little better.  (02 Nov 2020 14:32)          PAST MEDICAL & SURGICAL HISTORY:  CVA (cerebral vascular accident)    CHF (congestive heart failure)    H/O colectomy    Aorta disorder            MEDICATIONS  (STANDING):  aMIOdarone    Tablet 200 milliGRAM(s) Oral daily  aMIOdarone    Tablet   Oral   ascorbic acid 500 milliGRAM(s) Oral daily  aspirin enteric coated 81 milliGRAM(s) Oral daily  atorvastatin 40 milliGRAM(s) Oral at bedtime  chlorhexidine 2% Cloths 1 Application(s) Topical daily  folic acid 1 milliGRAM(s) Oral daily  gabapentin 100 milliGRAM(s) Oral at bedtime  levETIRAcetam 750 milliGRAM(s) Oral two times a day  melatonin 5 milliGRAM(s) Oral at bedtime  methylphenidate 5 milliGRAM(s) Oral <User Schedule>  metoprolol tartrate 50 milliGRAM(s) Oral every 6 hours  multivitamin 1 Tablet(s) Oral daily  pantoprazole    Tablet 40 milliGRAM(s) Oral two times a day  QUEtiapine 25 milliGRAM(s) Oral at bedtime  senna 2 Tablet(s) Oral at bedtime  sodium chloride 0.9%. 1000 milliLiter(s) (10 mL/Hr) IV Continuous <Continuous>    MEDICATIONS  (PRN):  benzocaine 15 mG/menthol 3.6 mG (Sugar-Free) Lozenge 1 Lozenge Oral three times a day PRN Sore Throat  lidocaine   Patch 1 Patch Transdermal every 24 hours PRN as needed          Allergies    penicillin (Unknown)    Intolerances          WEIGHTS:  Daily     Daily   Admit Wt: Drug Dosing Weight  Height (cm): 165.1 (02 Nov 2020 08:55)  Weight (kg): 112 (17 Nov 2020 12:00)  BMI (kg/m2): 41.1 (17 Nov 2020 12:00)  BSA (m2): 2.16 (17 Nov 2020 12:00)  I&O's Summary    25 Nov 2020 07:01  -  26 Nov 2020 07:00  --------------------------------------------------------  IN: 150 mL / OUT: 40 mL / NET: 110 mL    26 Nov 2020 07:01  -  27 Nov 2020 02:16  --------------------------------------------------------  IN: 450 mL / OUT: 0 mL / NET: 450 mL        VITAL SIGNS:  ICU Vital Signs Last 24 Hrs  T(C): 36.9 (26 Nov 2020 16:05), Max: 36.9 (26 Nov 2020 16:05)  T(F): 98.5 (26 Nov 2020 16:05), Max: 98.5 (26 Nov 2020 16:05)  HR: 91 (27 Nov 2020 01:00) (87 - 96)  BP: 106/67 (27 Nov 2020 01:00) (93/63 - 121/79)  BP(mean): 82 (27 Nov 2020 01:00) (70 - 96)  ABP: --  ABP(mean): --  RR: 16 (27 Nov 2020 01:00) (14 - 28)  SpO2: 99% (27 Nov 2020 01:00) (92% - 100%)        All laboratory results, radiology and medications reviewed.    LABS:  11-26    136  |  96<L>  |  63.0<H>  ----------------------------<  99  4.5   |  24.0  |  5.05<H>    Ca    8.0<L>      26 Nov 2020 02:42  Mg     2.1     11-26                                   9.0    13.50 )-----------( 174      ( 26 Nov 2020 02:42 )             28.7          PT/INR - ( 26 Nov 2020 20:11 )   PT: 47.5 sec;   INR: 4.39 ratio             ABG - ( 25 Nov 2020 17:30 )  pH, Arterial: 7.45  pH, Blood: x     /  pCO2: 37    /  pO2: 70    / HCO3: 26    / Base Excess: 1.3   /  SaO2: 96                    CAPILLARY BLOOD GLUCOSE                 PHYSICAL EXAM:  General:  well nourished, no acute distress  Neurology:  alert and oriented X 4, nonfocal, no gross deficits  Respiratory:  clear to auscultation bilaterally  CV:  regular rate and rhythm, normal S1 S2  Abdomen:  soft, nontender, nondistended, positive bowel sounds  Extremities:  warm, well perfused, + edema +DP pulses  Incisions:  midline sternal incision, c/d/i, sternum stable

## 2020-11-27 NOTE — PROGRESS NOTE ADULT - SUBJECTIVE AND OBJECTIVE BOX
Patient was seen and evaluated on dialysis.   Patient is tolerating the procedure well.   T(C): 36 (11-27-20 @ 10:00), Max: 36.9 (11-26-20 @ 16:05)  HR: 91 (11-27-20 @ 10:00) (87 - 96)  BP: 101/61 (11-27-20 @ 10:00) (93/63 - 122/73)  Continue dialysis:   Dialyzer:  Revaclear 300  QB:400     QD: 500ml.,  Goal UF 0 over  3 Hours

## 2020-11-27 NOTE — CHART NOTE - NSCHARTNOTEFT_GEN_A_CORE
60yMale POD #14  11/13 s/p resternotomy, CABG x 2 (SVG to OM, RPDA), AVR, MVR. Intra-op acute blood loss anemia and coagulopathy.  Post-op course notable for cardiogenic shock now resolved, acute blood loss anemia (improved), acute hypoxemic respiratory failure, junctional rhythm (with periods of ventricular standstill) now resolved (ST 100s), Supratherapeutic INR.        Physical Exam  Neuro: A+O x 3, non-focal, speech slightly garbled, Left sided weakness (Residual CVA)  Pulm: B/L   CV: RRR, irregularly irregular, +S1S2  Abd: soft, NT, ND, +BS  Ext: +DP Pulses b/l, +PT pulses, no edema  Inc: MSI C/D/I/stable w/ dressing, ___ C/D/I with Ace wrap  Chest tubes:    Imaging:  CXR:      ECG: 60yMale POD #14  11/13 s/p resternotomy, CABG x 2 (SVG to OM, RPDA), AVR, MVR. Intra-op acute blood loss anemia and coagulopathy.  Post-op course notable for cardiogenic shock now resolved, acute blood loss anemia (improved), acute hypoxemic respiratory failure, junctional rhythm (with periods of ventricular standstill) now resolved (NSR), Supratherapeutic INR.    Pt in bed say "he feel fine". He admits to intermittent right sided back pain that radiates to his right chest near the incision site when he's laying in the same position for long periods of time currently 5 (0-10) relieved with tylenol.    He denies shortness of breath, palpitations, headache, dizziness, nausea, or vomiting.        Physical Exam  Neuro: A+O x 3, non-focal, speech slightly garbled, Left sided weakness (Residual CVA)  Pulm: B/L diminished lung sounds  CV: +S1S2  Abd: soft, NT, ND, +BS  Ext: +DP Pulses b/l, +PT pulses, Trace edema   Inc: MSI C/D/I/stable w/ dressing w/ OPsite , Left inner thigh C/D/I with OPtsite  Lines: Right IJ tunneled catheter    PLAN:  Mitral and aortic regurgitation.    s/p CABG AVR/ MVR  NSR Cont. Lopressor 50 mg q6 hrs and PO Amio   continue aspirin for acute graft closure prophylaxis  continue statin for chronic graft patency prophylaxis  Encourage coughing and deep breathing exercises/incentive spirometry   continue to encourage nocturnal BiPAP (pt was refusing nightly)  Ambulate with PT assist as tolerated   Pain control cont. tyelnol, gabapentin, and lidocaine patch     Atrial flutter  Continue Amio 200 daily  Continue Lopressor 50 mg q6  Now in NSR.     Anemia due to other cause.   continue to trend daily CBC for now.  Contiune Epogetin with HD  contiune MVT, Folic acid  and B12        ESRD   s/p permacath on 11/20  HD per nephrology  HD today no fluid removal as per Renal team  No plans currently for AVF as per Vascular Surgery 11/21 note  adjust meds per CrCl  Hold nephrotoxic meds  bladder scan daily  Straight Cath 11/25 am for residual > 350 with no urge to urinate  Trend BMP.     Acute diastolic heart failure   supportive care.     Supratherapeutic INR  Pt supratherapeutic on 2.5 mg Coumadin  Vitamin K 1 mg given with repeat INR 4.39 from 5.10 yesterday  current INR 3.79 11/27  Continue daily INR and Coumadin for goal INR 1.5-2.0.    CVA (cerebral vascular accident).  Hx of CVA   Continue ASA and atorvastatin  PT/OT  PM&R following, suggesting SEAN.     Seizures  Continue Keppra.     Need for prophylactic measure.  Plan: SCDs, Coumadin for DVT prophylaxis  Protonix for GI prophylaxis 60yMale POD #14  11/13 s/p resternotomy, CABG x 2 (SVG to OM, RPDA), AVR, MVR. Intra-op acute blood loss anemia and coagulopathy.  Post-op course notable for cardiogenic shock now resolved, acute blood loss anemia (improved), acute hypoxemic respiratory failure, junctional rhythm (with periods of ventricular standstill) now resolved (NSR), Supratherapeutic INR.    Pt in bed say "he feel fine". VSS. pt in NSR on RM air. He admits to intermittent right sided back pain that radiates to his right chest near the incision site when he's laying in the same position for long periods of time currently 5 (0-10) relieved with tylenol.    He denies shortness of breath, palpitations, headache, dizziness, nausea, or vomiting.        Physical Exam  Neuro: A+O x 3, non-focal, speech slightly garbled, Left sided weakness (Residual CVA)  Pulm: B/L diminished lung sounds  CV: +S1S2  Abd: soft, NT, ND, +BS  Ext: +DP Pulses b/l, +PT pulses, Trace edema   Inc: MSI C/D/I/stable w/ dressing w/ OPsite , Left inner thigh C/D/I with OPtsite  Lines: Right IJ tunneled catheter    PLAN:  Mitral and aortic regurgitation.    s/p CABG AVR/ MVR  NSR Cont. Lopressor 50 mg q6 hrs and PO Amio   continue aspirin for acute graft closure prophylaxis  continue statin for chronic graft patency prophylaxis  Encourage coughing and deep breathing exercises/incentive spirometry   continue to encourage nocturnal BiPAP (pt was refusing nightly)  Ambulate with PT assist as tolerated   Pain control cont. tyelnol, gabapentin, and lidocaine patch     Atrial flutter  Continue Amio 200 daily  Continue Lopressor 50 mg q6  Now in NSR.     Anemia due to other cause.   continue to trend daily CBC for now.  Contiune Epogetin with HD  contiune MVT, Folic acid  and B12        ESRD   s/p permacath on 11/20  HD per nephrology  HD today no fluid removal as per Renal team  No plans currently for AVF as per Vascular Surgery 11/21 note  adjust meds per CrCl  Hold nephrotoxic meds  bladder scan daily  Straight Cath 11/25 am for residual > 350 with no urge to urinate  Trend BMP.     Acute diastolic heart failure   supportive care.     Supratherapeutic INR  Pt supratherapeutic on 2.5 mg Coumadin  Vitamin K 1 mg given with repeat INR 4.39 from 5.10 yesterday  current INR 3.79 11/27  Continue daily INR and Coumadin for goal INR 1.5-2.0.    CVA (cerebral vascular accident).  Hx of CVA   Continue ASA and atorvastatin  PT/OT  PM&R following, suggesting SEAN.     Seizures  Continue Keppra.     Need for prophylactic measure.  Plan: SCDs, Coumadin for DVT prophylaxis  Protonix for GI prophylaxis 60yMale POD #14  11/13 s/p resternotomy, CABG x 2 (SVG to OM, RPDA), AVR, MVR. Intra-op acute blood loss anemia and coagulopathy.  Post-op course notable for cardiogenic shock now resolved, acute blood loss anemia (improved), acute hypoxemic respiratory failure, junctional rhythm (with periods of ventricular standstill) now resolved (NSR), Supratherapeutic INR.    Pt in bed say "he feel fine". VSS. pt in NSR on RM air. He admits to intermittent right sided back pain that radiates to his right chest near the incision site when he's laying in the same position for long periods of time currently 5 (0-10) relieved with tylenol.    He denies shortness of breath, palpitations, headache, dizziness, nausea, or vomiting.        Physical Exam  Neuro: A+O x 3, non-focal, speech slightly garbled, Left sided weakness (Residual CVA)  Pulm: B/L diminished lung sounds  CV: +S1S2  Abd: soft, NT, ND, +BS  Ext: +DP Pulses b/l, +PT pulses, Trace edema   Inc: MSI C/D/I/stable w/ dressing w/ OPsite , Left inner thigh C/D/I with OPtsite  Lines: Right IJ tunneled catheter    PLAN:  Mitral and aortic regurgitation.    s/p CABG AVR/ MVR  NSR Cont. Lopressor 50 mg q6 hrs and PO Amio   continue aspirin for acute graft closure prophylaxis  continue statin for chronic graft patency prophylaxis  Encourage coughing and deep breathing exercises/incentive spirometry   continue to encourage nocturnal BiPAP (pt was refusing nightly)  Ambulate with PT assist as tolerated   Pain control cont. Tylenol, gabapentin, and lidocaine patch     Atrial flutter  Continue Amio 200 daily  Continue Lopressor 50 mg q6  Now in NSR.     Anemia due to other cause.   continue to trend daily CBC for now.  Contiune Epogetin with HD  contiune MVT, Folic acid  and B12        ESRD   s/p permacath on 11/20  HD per nephrology  HD today no fluid removal as per Renal team  No plans currently for AVF as per Vascular Surgery 11/21 note  adjust meds per CrCl  Hold nephrotoxic meds  bladder scan daily  Straight Cath 11/25 am for residual > 350 with no urge to urinate  Trend BMP.     Acute diastolic heart failure   supportive care.     Supratherapeutic INR  Pt supratherapeutic on 2.5 mg Coumadin  Vitamin K 1 mg given with repeat INR 4.39 from 5.10 yesterday  current INR 3.79 11/27  Continue daily INR and Coumadin for goal INR 1.5-2.0.    CVA (cerebral vascular accident).  Hx of CVA   Continue ASA and atorvastatin  PT/OT (pt refused today)  PM&R following,  plan for Acute rehab    Seizures  Continue Keppra.     Need for prophylactic measure.  Plan: SCDs, Coumadin for DVT prophylaxis    Dispo AR  Protonix for GI prophylaxis

## 2020-11-27 NOTE — PROGRESS NOTE ADULT - ASSESSMENT
1) Martin -requiring RRT  HD TIW via RIJ TDC  2) chronic diastolic HFpEF, moderate valvular heart disease including moderate aortic and mitral regurgitation; s/p CABG, AVR/MVR;   3) Anemia of renal disease with superimposed Intra-op acute blood loss anemia and coagulopathy  4) Cardiogenic shock- resolved      HD today  REINALDO with HD, monitor H/H

## 2020-11-28 LAB
ANION GAP SERPL CALC-SCNC: 12 MMOL/L — SIGNIFICANT CHANGE UP (ref 5–17)
BUN SERPL-MCNC: 52 MG/DL — HIGH (ref 8–20)
CA-I BLD-SCNC: 1.06 MMOL/L — LOW (ref 1.15–1.33)
CALCIUM SERPL-MCNC: 7.9 MG/DL — LOW (ref 8.6–10.2)
CHLORIDE SERPL-SCNC: 98 MMOL/L — SIGNIFICANT CHANGE UP (ref 98–107)
CO2 SERPL-SCNC: 26 MMOL/L — SIGNIFICANT CHANGE UP (ref 22–29)
CREAT SERPL-MCNC: 4.56 MG/DL — HIGH (ref 0.5–1.3)
GLUCOSE SERPL-MCNC: 97 MG/DL — SIGNIFICANT CHANGE UP (ref 70–99)
HCT VFR BLD CALC: 28.6 % — LOW (ref 39–50)
HGB BLD-MCNC: 8.9 G/DL — LOW (ref 13–17)
INR BLD: 3.25 RATIO — HIGH (ref 0.88–1.16)
MAGNESIUM SERPL-MCNC: 1.8 MG/DL — SIGNIFICANT CHANGE UP (ref 1.8–2.6)
MCHC RBC-ENTMCNC: 29.4 PG — SIGNIFICANT CHANGE UP (ref 27–34)
MCHC RBC-ENTMCNC: 31.1 GM/DL — LOW (ref 32–36)
MCV RBC AUTO: 94.4 FL — SIGNIFICANT CHANGE UP (ref 80–100)
PHOSPHATE SERPL-MCNC: 3 MG/DL — SIGNIFICANT CHANGE UP (ref 2.4–4.7)
PLATELET # BLD AUTO: 192 K/UL — SIGNIFICANT CHANGE UP (ref 150–400)
POTASSIUM SERPL-MCNC: 4.2 MMOL/L — SIGNIFICANT CHANGE UP (ref 3.5–5.3)
POTASSIUM SERPL-SCNC: 4.2 MMOL/L — SIGNIFICANT CHANGE UP (ref 3.5–5.3)
PROTHROM AB SERPL-ACNC: 35.6 SEC — HIGH (ref 10.6–13.6)
RBC # BLD: 3.03 M/UL — LOW (ref 4.2–5.8)
RBC # FLD: 15.1 % — HIGH (ref 10.3–14.5)
SODIUM SERPL-SCNC: 136 MMOL/L — SIGNIFICANT CHANGE UP (ref 135–145)
WBC # BLD: 13.31 K/UL — HIGH (ref 3.8–10.5)
WBC # FLD AUTO: 13.31 K/UL — HIGH (ref 3.8–10.5)

## 2020-11-28 PROCEDURE — 93010 ELECTROCARDIOGRAM REPORT: CPT

## 2020-11-28 PROCEDURE — 99233 SBSQ HOSP IP/OBS HIGH 50: CPT

## 2020-11-28 RX ORDER — OXYCODONE HYDROCHLORIDE 5 MG/1
5 TABLET ORAL ONCE
Refills: 0 | Status: DISCONTINUED | OUTPATIENT
Start: 2020-11-28 | End: 2020-11-28

## 2020-11-28 RX ORDER — WARFARIN SODIUM 2.5 MG/1
1 TABLET ORAL ONCE
Refills: 0 | Status: COMPLETED | OUTPATIENT
Start: 2020-11-28 | End: 2020-11-28

## 2020-11-28 RX ADMIN — Medication 50 MILLIGRAM(S): at 23:26

## 2020-11-28 RX ADMIN — SODIUM CHLORIDE 3 MILLILITER(S): 9 INJECTION INTRAMUSCULAR; INTRAVENOUS; SUBCUTANEOUS at 21:52

## 2020-11-28 RX ADMIN — OXYCODONE HYDROCHLORIDE 5 MILLIGRAM(S): 5 TABLET ORAL at 21:53

## 2020-11-28 RX ADMIN — Medication 50 MILLIGRAM(S): at 05:38

## 2020-11-28 RX ADMIN — Medication 500 MILLIGRAM(S): at 08:15

## 2020-11-28 RX ADMIN — Medication 5 MILLIGRAM(S): at 08:15

## 2020-11-28 RX ADMIN — OXYCODONE HYDROCHLORIDE 5 MILLIGRAM(S): 5 TABLET ORAL at 22:36

## 2020-11-28 RX ADMIN — Medication 50 MILLIGRAM(S): at 17:01

## 2020-11-28 RX ADMIN — WARFARIN SODIUM 1 MILLIGRAM(S): 2.5 TABLET ORAL at 21:53

## 2020-11-28 RX ADMIN — Medication 5 MILLIGRAM(S): at 21:53

## 2020-11-28 RX ADMIN — LEVETIRACETAM 750 MILLIGRAM(S): 250 TABLET, FILM COATED ORAL at 05:38

## 2020-11-28 RX ADMIN — LEVETIRACETAM 750 MILLIGRAM(S): 250 TABLET, FILM COATED ORAL at 17:01

## 2020-11-28 RX ADMIN — PANTOPRAZOLE SODIUM 40 MILLIGRAM(S): 20 TABLET, DELAYED RELEASE ORAL at 05:38

## 2020-11-28 RX ADMIN — Medication 650 MILLIGRAM(S): at 00:25

## 2020-11-28 RX ADMIN — Medication 1 TABLET(S): at 08:15

## 2020-11-28 RX ADMIN — ATORVASTATIN CALCIUM 40 MILLIGRAM(S): 80 TABLET, FILM COATED ORAL at 21:53

## 2020-11-28 RX ADMIN — SODIUM CHLORIDE 3 MILLILITER(S): 9 INJECTION INTRAMUSCULAR; INTRAVENOUS; SUBCUTANEOUS at 05:40

## 2020-11-28 RX ADMIN — Medication 50 MILLIGRAM(S): at 12:15

## 2020-11-28 RX ADMIN — OXYCODONE HYDROCHLORIDE 5 MILLIGRAM(S): 5 TABLET ORAL at 06:38

## 2020-11-28 RX ADMIN — Medication 5 MILLIGRAM(S): at 12:17

## 2020-11-28 RX ADMIN — CHLORHEXIDINE GLUCONATE 1 APPLICATION(S): 213 SOLUTION TOPICAL at 08:17

## 2020-11-28 RX ADMIN — SODIUM CHLORIDE 3 MILLILITER(S): 9 INJECTION INTRAMUSCULAR; INTRAVENOUS; SUBCUTANEOUS at 15:00

## 2020-11-28 RX ADMIN — AMIODARONE HYDROCHLORIDE 200 MILLIGRAM(S): 400 TABLET ORAL at 05:37

## 2020-11-28 RX ADMIN — Medication 81 MILLIGRAM(S): at 08:15

## 2020-11-28 RX ADMIN — OXYCODONE HYDROCHLORIDE 5 MILLIGRAM(S): 5 TABLET ORAL at 05:38

## 2020-11-28 RX ADMIN — QUETIAPINE FUMARATE 25 MILLIGRAM(S): 200 TABLET, FILM COATED ORAL at 21:52

## 2020-11-28 RX ADMIN — Medication 1 MILLIGRAM(S): at 08:15

## 2020-11-28 RX ADMIN — GABAPENTIN 100 MILLIGRAM(S): 400 CAPSULE ORAL at 21:53

## 2020-11-28 RX ADMIN — PANTOPRAZOLE SODIUM 40 MILLIGRAM(S): 20 TABLET, DELAYED RELEASE ORAL at 17:01

## 2020-11-28 NOTE — PROGRESS NOTE ADULT - PROBLEM SELECTOR PLAN 9
SCDs, Coumadin for DVT prophylaxis.  Protonix for GI prophylaxis.    Plan of care to be discussed further with CT Surgery attending / team on AM rounds.

## 2020-11-28 NOTE — PROGRESS NOTE ADULT - SUBJECTIVE AND OBJECTIVE BOX
[facility] DIVISION OF KIDNEY DISEASES AND HYPERTENSION -- HEMODIALYSIS NOTE  --------------------------------------------------------------------------------  Chief Complaint: ESRD/Ongoing hemodialysis requirement    24 hour events/subjective:  s/p HD yesterday  tolerated well  pt seen and examined; denies any acute complaint      PAST HISTORY  --------------------------------------------------------------------------------  No significant changes to PMH, PSH, FHx, SHx, unless otherwise noted    ALLERGIES & MEDICATIONS  --------------------------------------------------------------------------------  Allergies    penicillin (Unknown)    Intolerances      Standing Inpatient Medications  aMIOdarone    Tablet   Oral   aMIOdarone    Tablet 200 milliGRAM(s) Oral daily  ascorbic acid 500 milliGRAM(s) Oral daily  aspirin enteric coated 81 milliGRAM(s) Oral daily  atorvastatin 40 milliGRAM(s) Oral at bedtime  chlorhexidine 2% Cloths 1 Application(s) Topical daily  epoetin yolanda-epbx (RETACRIT) Injectable 99961 Unit(s) IV Push <User Schedule>  folic acid 1 milliGRAM(s) Oral daily  gabapentin 100 milliGRAM(s) Oral at bedtime  levETIRAcetam 750 milliGRAM(s) Oral two times a day  melatonin 5 milliGRAM(s) Oral at bedtime  methylphenidate 5 milliGRAM(s) Oral <User Schedule>  metoprolol tartrate 50 milliGRAM(s) Oral every 6 hours  multivitamin 1 Tablet(s) Oral daily  pantoprazole    Tablet 40 milliGRAM(s) Oral two times a day  QUEtiapine 25 milliGRAM(s) Oral at bedtime  sodium chloride 0.9% lock flush 3 milliLiter(s) IV Push every 8 hours  warfarin 1 milliGRAM(s) Oral once    PRN Inpatient Medications  acetaminophen   Tablet .. 650 milliGRAM(s) Oral every 6 hours PRN  benzocaine 15 mG/menthol 3.6 mG (Sugar-Free) Lozenge 1 Lozenge Oral three times a day PRN  lidocaine   Patch 1 Patch Transdermal every 24 hours PRN      REVIEW OF SYSTEMS  --------------------------------------------------------------------------------  Gen: No weight changes, fatigue, fevers/chills, weakness  Skin: No rashes  Head/Eyes/Ears/Mouth: No headache  Respiratory: No dyspnea, cough,  CV: No chest pain, orthopnea  GI: No abdominal pain, diarrhea, constipation, nausea, vomiting,  MSK: No joint pain  Neuro: No dizziness/lightheadedness, weakness  Heme: No bleeding  Psych: No significant nervousness, anxiety, stress, depression    All other systems were reviewed and are negative, except as noted.    VITALS/PHYSICAL EXAM  --------------------------------------------------------------------------------  T(C): 36.6 (11-28-20 @ 10:22), Max: 37.1 (11-27-20 @ 17:23)  HR: 97 (11-28-20 @ 12:12) (82 - 97)  BP: 110/68 (11-28-20 @ 12:12) (97/57 - 118/65)  RR: 16 (11-28-20 @ 10:22) (16 - 18)  SpO2: 98% (11-28-20 @ 10:22) (92% - 98%)  Wt(kg): --        11-27-20 @ 07:01  -  11-28-20 @ 07:00  --------------------------------------------------------  IN: 240 mL / OUT: 0 mL / NET: 240 mL      Physical Exam:  	Gen: NAD,  	HEENT: PERRL, supple neck,  	Pulm: CTA B/L  	CV: RRR, S1S2; no rub  	Abd: +BS, soft, nontender  	UE: Warm, intact strength; no asterixis  	LE: Warm, no edema  	Neuro: No focal deficits  	Psych: Normal affect and mood  	Skin: Warm, without rashes  	Vascular access: ANTON tunneled HD catheter    LABS/STUDIES  --------------------------------------------------------------------------------              8.9    13.31 >-----------<  192      [11-28-20 @ 06:37]              28.6     136  |  98  |  52.0  ----------------------------<  97      [11-28-20 @ 06:37]  4.2   |  26.0  |  4.56        Ca     7.9     [11-28-20 @ 06:37]      iCa    1.06     [11-28 @ 06:37]      Mg     1.8     [11-28-20 @ 06:37]      Phos  3.0     [11-28-20 @ 06:37]      PT/INR: PT 35.6 , INR 3.25       [11-28-20 @ 06:37]      Iron 29, TIBC 332, %sat 9      [11-03-20 @ 08:18]  Ferritin 230      [11-03-20 @ 08:18]  TSH 1.95      [11-03-20 @ 08:18]  Lipid: chol --, , HDL --, LDL --      [11-15-20 @ 02:40]    HBsAb <3.0      [11-07-20 @ 05:41]  HBsAg Nonreact      [11-07-20 @ 05:41]  HBcAb Nonreact      [11-07-20 @ 05:41]  HCV 0.07, Nonreact      [11-07-20 @ 05:41]

## 2020-11-28 NOTE — PROGRESS NOTE ADULT - SUBJECTIVE AND OBJECTIVE BOX
[facility], [facility]                                                    [address] , [address], [place] [address]                                                         PHONE: [phone]    FAX: [phone] [address], [address], [place] [address]                                                 PHONE: [phone]    FAX: [phone]  *******************************************************************************    Overnight events/Subjective Assessment:    INTERPRETATION OF TELEMETRY (personally reviewed):    penicillin (Unknown)    MEDICATIONS  (STANDING):  aMIOdarone    Tablet   Oral   aMIOdarone    Tablet 200 milliGRAM(s) Oral daily  ascorbic acid 500 milliGRAM(s) Oral daily  aspirin enteric coated 81 milliGRAM(s) Oral daily  atorvastatin 40 milliGRAM(s) Oral at bedtime  chlorhexidine 2% Cloths 1 Application(s) Topical daily  epoetin yolanda-epbx (RETACRIT) Injectable 66655 Unit(s) IV Push <User Schedule>  folic acid 1 milliGRAM(s) Oral daily  gabapentin 100 milliGRAM(s) Oral at bedtime  levETIRAcetam 750 milliGRAM(s) Oral two times a day  melatonin 5 milliGRAM(s) Oral at bedtime  methylphenidate 5 milliGRAM(s) Oral <User Schedule>  metoprolol tartrate 50 milliGRAM(s) Oral every 6 hours  multivitamin 1 Tablet(s) Oral daily  pantoprazole    Tablet 40 milliGRAM(s) Oral two times a day  QUEtiapine 25 milliGRAM(s) Oral at bedtime  sodium chloride 0.9% lock flush 3 milliLiter(s) IV Push every 8 hours  warfarin 1 milliGRAM(s) Oral once    MEDICATIONS  (PRN):  acetaminophen   Tablet .. 650 milliGRAM(s) Oral every 6 hours PRN Mild Pain (1 - 3)  benzocaine 15 mG/menthol 3.6 mG (Sugar-Free) Lozenge 1 Lozenge Oral three times a day PRN Sore Throat  lidocaine   Patch 1 Patch Transdermal every 24 hours PRN as needed      Vital Signs Last 24 Hrs  T(C): 36.5 (28 Nov 2020 05:35), Max: 37.1 (27 Nov 2020 17:23)  T(F): 97.7 (28 Nov 2020 05:35), Max: 98.7 (27 Nov 2020 17:23)  HR: 88 (28 Nov 2020 05:35) (84 - 96)  BP: 113/66 (28 Nov 2020 05:35) (97/57 - 118/65)  BP(mean): 88 (27 Nov 2020 12:00) (88 - 88)  RR: 18 (28 Nov 2020 05:35) (16 - 18)  SpO2: 95% (28 Nov 2020 05:35) (92% - 96%)    I&O's Detail    27 Nov 2020 07:01  -  28 Nov 2020 07:00  --------------------------------------------------------  IN:    Oral Fluid: 240 mL  Total IN: 240 mL    OUT:    Other (mL): 0 mL  Total OUT: 0 mL    Total NET: 240 mL        I&O's Summary    27 Nov 2020 07:01  -  28 Nov 2020 07:00  --------------------------------------------------------  IN: 240 mL / OUT: 0 mL / NET: 240 mL            PHYSICAL EXAM:  General: Appears well developed, well nourished, no acute distress  HEENT: Head: normocephalic, atraumatic  Eyes: Pupils equal and reactive  Neck: Supple, no carotid bruit, no JVD, no HJR  CARDIOVASCULAR: Normal S1 and S2, no murmur, rub, or gallop  LUNGS: Clear to auscultation bilaterally, no rales, rhonchi or wheeze  ABDOMEN: Soft, nontender, non-distended, positive bowel sounds, no mass or bruit  EXTREMITIES: No edema, distal pulses WNL  SKIN: Warm and dry with normal turgor  NEURO: Alert & oriented x 3, grossly intact  PSYCH: normal mood and affect        LABS:                        8.9    13.31 )-----------( 192      ( 28 Nov 2020 06:37 )             28.6     11-28    136  |  98  |  52.0<H>  ----------------------------<  97  4.2   |  26.0  |  4.56<H>    Ca    7.9<L>      28 Nov 2020 06:37  Phos  3.0     11-28  Mg     1.8     11-28          PT/INR - ( 28 Nov 2020 06:37 )   PT: 35.6 sec;   INR: 3.25 ratio           serum  Lipids:     Thyroid Stimulating Hormone, Serum: 1.95 uIU/mL (11-03 @ 08:18)      RADIOLOGY & ADDITIONAL STUDIES:    ECG:    ECHO:    STRESS TEST:    CARDIAC CATHETERIZATION:    ASSESSMENT AND PLAN:  In summary, JONATHAN GIBSON is a 60y Male with past medical history significant for CADBG,Bio AVR, Aortic dissection, , ARF/CRI, CVA, HFNEF  - cont current care  - PT/OT  - meds/labs reviewed      Ramon Quach DO

## 2020-11-28 NOTE — PROGRESS NOTE ADULT - ASSESSMENT
1) Martin -requiring RRT  HD TIW via RIJ TDC  Plan for HD on monday  Monitor SCr, lytes, UOP     2) chronic diastolic HFpEF, moderate valvular heart disease including moderate aortic and mitral regurgitation; s/p CABG, AVR/MVR;   3) Anemia of renal disease with superimposed Intra-op acute blood loss anemia and coagulopathy; REINALDO with HD, monitor H/H  4) Cardiogenic shock- resolved

## 2020-11-28 NOTE — PROGRESS NOTE ADULT - SUBJECTIVE AND OBJECTIVE BOX
POD # 15 s/p reop AVR (tissue), MV replacement (tissue), CABG X 2    HPI:  Patient is a 60 yoM with PMH of CAD s/p stent in LAD, leaky aortic valve, uncontrolled bp, thoracic aortic dissection requiring emergent surgery, CVA with left sided sensory loss, colostomy for bowel ischemia s/p reversal, psoriasis on Humira injections presents from home with sob and le edema. Patient states the past several weeks, he has not been taking his medications because he forgot. Patient states the sob has gotten worse over the past few days and also associated with chest pressure. Patient also hasnt seen his cardiologist in several months. Patient worked up in the er and had an elevated bnp and htn urgency and santos. Patient seen at bedside and states feeling a little better.  (02 Nov 2020 14:32)    PAST MEDICAL & SURGICAL HISTORY:  CVA (cerebral vascular accident)  CHF (congestive heart failure)  H/O colectomy  Aorta disorder    FAMILY HISTORY:  FH: hypertension    Brief Hospital Course: Ultimately, patient arrived with acute diastolic heart failure, moderate-severe MR, moderate TR, mod-severe AI, SANTOS on CKD requiring HD (now with RIJ permacath placed 11/20), and anemia (likely d/t chronic renal disease, GI w/u unremarkable). LHC revealed severe 2 vessel CAD and known residual Type B dissection. Patient underwent resternotomy, CABG x 2 (SVG to OM, RPDA), AVR, MVR on 11/13 with Dr. Butler. Intra-op acute blood loss anemia and coagulopathy noted. Post-operative course notable for cardiogenic shock (resolved), acute blood loss anemia (improved), acute hypoxemic respiratory failure (resolved), junctional rhythm with periods of ventricular standstill (now resolved, rhythm remains sinus tach 100s), and Supratherapeutic INR.    Subjective: "My back hurts. Can I get more pain medications?" Denies fevers, chills, lightheadedness, dizziness, HA, CP, palpitations, SOB, abdominal pain, N/V, diarrhea, numbness/tingling in extremities, or any other acute complaints.    MEDICATIONS  (STANDING):  aMIOdarone    Tablet 200 milliGRAM(s) Oral daily  ascorbic acid 500 milliGRAM(s) Oral daily  aspirin enteric coated 81 milliGRAM(s) Oral daily  atorvastatin 40 milliGRAM(s) Oral at bedtime  chlorhexidine 2% Cloths 1 Application(s) Topical daily  epoetin yolanda-epbx (RETACRIT) Injectable 57137 Unit(s) IV Push <User Schedule>  folic acid 1 milliGRAM(s) Oral daily  gabapentin 100 milliGRAM(s) Oral at bedtime  levETIRAcetam 750 milliGRAM(s) Oral two times a day  melatonin 5 milliGRAM(s) Oral at bedtime  methylphenidate 5 milliGRAM(s) Oral <User Schedule>  metoprolol tartrate 50 milliGRAM(s) Oral every 6 hours  multivitamin 1 Tablet(s) Oral daily  pantoprazole    Tablet 40 milliGRAM(s) Oral two times a day  QUEtiapine 25 milliGRAM(s) Oral at bedtime  sodium chloride 0.9% lock flush 3 milliLiter(s) IV Push every 8 hours    MEDICATIONS  (PRN):  acetaminophen   Tablet .. 650 milliGRAM(s) Oral every 6 hours PRN Mild Pain (1 - 3)  benzocaine 15 mG/menthol 3.6 mG (Sugar-Free) Lozenge 1 Lozenge Oral three times a day PRN Sore Throat  lidocaine   Patch 1 Patch Transdermal every 24 hours PRN as needed    Allergies: penicillin (Unknown)    Vitals   T(C): 36.3 (27 Nov 2020 23:13), Max: 37.1 (27 Nov 2020 17:23)  T(F): 97.4 (27 Nov 2020 23:13), Max: 98.7 (27 Nov 2020 17:23)  HR: 84 (27 Nov 2020 23:13) (84 - 96)  BP: 107/60 (27 Nov 2020 23:13) (96/68 - 122/73)  BP(mean): 88 (27 Nov 2020 12:00) (74 - 92)  RR: 17 (27 Nov 2020 23:13) (16 - 22)  SpO2: 93% (27 Nov 2020 23:13) (92% - 100%)    I&O's Detail    26 Nov 2020 07:01  -  27 Nov 2020 07:00  --------------------------------------------------------  IN:    IV PiggyBack: 50 mL    Oral Fluid: 400 mL  Total IN: 450 mL    OUT:    Intermittent Catheterization - Urethral (mL): 340 mL  Total OUT: 340 mL    Total NET: 110 mL      27 Nov 2020 07:01  -  28 Nov 2020 02:13  --------------------------------------------------------  IN:    Oral Fluid: 120 mL  Total IN: 120 mL    OUT:    Other (mL): 0 mL  Total OUT: 0 mL    Total NET: 120 mL    Physical Exam  Neuro: Alert and oriented this AM, non-focal, speech clear and intact  HEENT:  NCAT, PERRL, EOMI. No conjuctival edema or icterus, no thrush.    Neck:  Supple, trachea midline  Pulm: CTA, good air entry, equal bilaterally, no rales/rhonchi/wheezing, no accessory muscle use noted  Chest: +PW (isolated)  CV: tachy rate, regular rhythm, +S1S2  Abd: soft, NT, ND, + BS  Ext: CORDOVA x 4, +1LE pitting edema b/l LEs, no cyanosis or clubbing, distal motor/neuro/circ intact with +weakness of L side (residual from previous CVA)  Skin: warm, dry, well perfused  Incisions: midsternal incision with dressing C/D/I, sternum stable, no click appreciated, LE harvest site C/D/I   RIJ permacath  RUE midline    LABS                        8.8    13.90 )-----------( 188      ( 27 Nov 2020 03:11 )             27.7     11-27    x   |  x   |  28.0<H>  ----------------------------<  x   x    |  x   |  x     Ca    7.7<L>      27 Nov 2020 03:11  Mg     2.1     11-27    PT/INR - ( 27 Nov 2020 13:24 )   PT: 41.3 sec;   INR: 3.79 ratio        Last CXR:  < from: Xray Chest 1 View- PORTABLE-Routine (Xray Chest 1 View- PORTABLE-Routine in AM.) (11.27.20 @ 07:03) >  Frontal expiratory view of the chest shows the heart to be similarly enlarged in size. Right dialysis catheter and mitral valve ring are again noted.  The lungs are clear and there is no evidence of pneumothorax nor definite pleural effusion.  IMPRESSION:  No pleural effusion identified.  < end of copied text >

## 2020-11-28 NOTE — PROGRESS NOTE ADULT - PROBLEM SELECTOR PLAN 1
S/p Reop AVR(t), MVR (t), CABG X 2 11/13.  Neurologically at baseline (intermittent confusion).  Hemodynamically stable.  Trend H/H.   Continue aspirin for acute graft closure prophylaxis.  Continue statin for chronic graft patency prophylaxis.  Continue Lopressor 50 mg q6 hrs and PO Amio for rate control.   Follow up AM EKG.    Encourage coughing and deep breathing exercises/incentive spirometry use hourly.   Ambulate with PT assist as tolerated.  Analgesics for pain control PRN.   Continue bowel regimen PRN.   Patient refusing BiPAP at night.   .

## 2020-11-28 NOTE — PROGRESS NOTE ADULT - ASSESSMENT
60 year old male with PMH 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     Ultimately, patient arrived with acute diastolic heart failure, moderate-severe MR, moderate TR, mod-severe AI, SANTOS on CKD requiring HD (now with RIJ permacath placed 11/20), and anemia (likely d/t chronic renal disease, GI w/u unremarkable). LHC revealed severe 2 vessel CAD and known residual Type B dissection.     Patient underwent resternotomy, CABG x 2 (SVG to OM, RPDA), AVR, MVR on 11/13 with Dr. Butler. Intra-op acute blood loss anemia and coagulopathy noted.    Post-operative course notable for cardiogenic shock (resolved), acute blood loss anemia (improved), acute hypoxemic respiratory failure (resolved), junctional rhythm with periods of ventricular standstill (now resolved, rhythm remains sinus tach 100s), and Supratherapeutic INR.

## 2020-11-28 NOTE — CHART NOTE - NSCHARTNOTEFT_GEN_A_CORE
60yMale POD #15  11/13 s/p resternotomy, CABG x 2 (SVG to OM, RPDA), AVR, MVR.   Post-op course notable for cardiogenic shock now resolved, acute blood loss anemia (improved), acute hypoxemic respiratory failure, junctional rhythm (with periods of ventricular standstill) now resolved (NSR), Supratherapeutic INR.    Pt OOB to the chair states, "He feels fine except the right side of his back is bothering him." He states its not pain its discomfort describing numbness and tingling in that area relieved with Tylenol and warm packs.    He denies chest pain, shortness of breath, palpitations, headache, dizziness, nausea, or vomiting.         Physical Exam  Neuro: A+O x 3, non-focal, speech slightly garbled, Left sided weakness (Residual CVA)  Pulm: B/L diminished lung sounds with crackles appreciated at the bases  CV: +S1S2  Abd: soft, NT, ND, +BS  Ext: +DP Pulses b/l, +PT pulses, +2 LE edema  Inc: MSI C/D/I/stable w/ dressing w/ OPsite , Left inner thigh C/D/I with OPtsite  Lines: Right IJ tunneled catheter    PLAN:  Mitral and aortic regurgitation.    s/p CABG AVR/ MVR  NSR Cont. Lopressor 50 mg q6 hrs and PO Amio   continue aspirin for acute graft closure prophylaxis  continue statin for chronic graft patency prophylaxis  Encourage coughing and deep breathing exercises/incentive spirometry   continue to encourage nocturnal BiPAP (pt was refusing nightly)  Ambulate with PT assist as tolerated (pt refused 11/27 and 11/28) Pt explained the importance of ambulating and working with physical therapy   Pain control cont. Tylenol, gabapentin, and lidocaine patch, and warm packs    Atrial flutter (now NSR)  Continue Amio 200 daily  Continue Lopressor 50 mg q6  Monitor EKG      Anemia due to other cause.   continue to trend daily CBC for now. H and H stable 11/28  Contiune Epogetin with HD  Trasfuse PRN  continue MVT, Folic acid  and B12        ESRD   s/p permacath on 11/20  HD per nephrology  HD today no fluid removal as per Renal team  No plans currently for AVF as per Vascular Surgery 11/21 note  adjust meds per CrCl  Hold nephrotoxic meds  bladder scan daily  Trend BMP.     Acute diastolic heart failure   supportive care.     Supratherapeutic INR  Continue daily INR and Coumadin for goal INR 1.5-2.0.  Vitamin K 1 mg 11/27  current INR 3.25  coumadin 1mg tonight 11/28 as per Tosin      CVA (cerebral vascular accident).  Hx of CVA   Continue ASA and atorvastatin  PT/OT (pt refused today)  PM&R following,  plan for Acute rehab    Seizures  Continue Keppra.     Need for prophylactic measure.  Plan: SCDs, Coumadin for DVT prophylaxis  Protonix for GI prophylaxis.    Dispo Acute Rehab MONDAY 11/30    Plan discussed with Dr. Leahy in AM rounds 60yMale POD #15  11/13 s/p resternotomy, CABG x 2 (SVG to OM, RPDA), AVR, MVR.   Post-op course notable for cardiogenic shock now resolved, acute blood loss anemia (improved), acute hypoxemic respiratory failure, junctional rhythm (with periods of ventricular standstill) now resolved (NSR), Supratherapeutic INR.    Pt OOB to the chair states, "He feels fine except the right side of his back is bothering him." He states its not pain its discomfort describing numbness and tingling in that area relieved with Tylenol and warm packs.    He denies chest pain, shortness of breath, palpitations, headache, dizziness, nausea, or vomiting.         Physical Exam  Neuro: A+O x 3, non-focal, speech slightly garbled, Left sided weakness (Residual CVA)  Pulm: B/L diminished lung sounds with crackles appreciated at the bases  CV: +S1S2  Abd: soft, NT, ND, +BS  Ext: +DP Pulses b/l, +PT pulses, +2 LE edema  Inc: MSI C/D/I/stable w/ dressing w/ OPsite , Left inner thigh C/D/I with OPtsite  Lines: Right IJ tunneled catheter    PLAN:  Mitral and aortic regurgitation.    s/p CABG AVR/ MVR  NSR Cont. Lopressor 50 mg q6 hrs and PO Amio   continue aspirin for acute graft closure prophylaxis  continue statin for chronic graft patency prophylaxis  Encourage coughing and deep breathing exercises/incentive spirometry   continue to encourage nocturnal BiPAP (pt was refusing nightly)  Ambulate with PT assist as tolerated (pt refused 11/27 and 11/28) Pt explained the importance of ambulating and working with physical therapy   Pain control cont. Tylenol, gabapentin, and lidocaine patch, and warm packs    Atrial flutter (now NSR)  Continue Amio 200 daily  Continue Lopressor 50 mg q6  Monitor EKG      Anemia due to other cause.   continue to trend daily CBC for now. H and H stable 11/28  Contiune Epogetin with HD  Trasfuse PRN  continue MVT, Folic acid  and B12        ESRD   s/p permacath on 11/20  HD  11/30 as per nephrology  HD today no fluid removal as per Renal team  No plans currently for AVF as per Vascular Surgery 11/21 note  adjust meds per CrCl  Hold nephrotoxic meds  bladder scan daily  Trend BMP.     Acute diastolic heart failure   supportive care.     Supratherapeutic INR  Continue daily INR and Coumadin for goal INR 1.5-2.0.  Vitamin K 1 mg 11/27  current INR 3.25  coumadin 1mg tonight 11/28 as per Tosin      CVA (cerebral vascular accident).  Hx of CVA   Continue ASA and atorvastatin  PT/OT (pt refused today)  PM&R following,  plan for Acute rehab    Seizures  Continue Keppra.     Need for prophylactic measure.  Plan: SCDs, Coumadin for DVT prophylaxis  Protonix for GI prophylaxis.    Dispo Acute Rehab MONDAY 11/30    Plan discussed with Dr. Leahy in AM rounds

## 2020-11-29 ENCOUNTER — TRANSCRIPTION ENCOUNTER (OUTPATIENT)
Age: 60
End: 2020-11-29

## 2020-11-29 LAB
ANION GAP SERPL CALC-SCNC: 13 MMOL/L — SIGNIFICANT CHANGE UP (ref 5–17)
BUN SERPL-MCNC: 62 MG/DL — HIGH (ref 8–20)
CALCIUM SERPL-MCNC: 7.9 MG/DL — LOW (ref 8.6–10.2)
CHLORIDE SERPL-SCNC: 98 MMOL/L — SIGNIFICANT CHANGE UP (ref 98–107)
CO2 SERPL-SCNC: 26 MMOL/L — SIGNIFICANT CHANGE UP (ref 22–29)
CREAT SERPL-MCNC: 4.87 MG/DL — HIGH (ref 0.5–1.3)
GLUCOSE SERPL-MCNC: 86 MG/DL — SIGNIFICANT CHANGE UP (ref 70–99)
HCT VFR BLD CALC: 29.7 % — LOW (ref 39–50)
HGB BLD-MCNC: 9.2 G/DL — LOW (ref 13–17)
INR BLD: 2.93 RATIO — HIGH (ref 0.88–1.16)
MAGNESIUM SERPL-MCNC: 1.7 MG/DL — LOW (ref 1.8–2.6)
MCHC RBC-ENTMCNC: 29.8 PG — SIGNIFICANT CHANGE UP (ref 27–34)
MCHC RBC-ENTMCNC: 31 GM/DL — LOW (ref 32–36)
MCV RBC AUTO: 96.1 FL — SIGNIFICANT CHANGE UP (ref 80–100)
PHOSPHATE SERPL-MCNC: 2.5 MG/DL — SIGNIFICANT CHANGE UP (ref 2.4–4.7)
PLATELET # BLD AUTO: 185 K/UL — SIGNIFICANT CHANGE UP (ref 150–400)
POTASSIUM SERPL-MCNC: 4.4 MMOL/L — SIGNIFICANT CHANGE UP (ref 3.5–5.3)
POTASSIUM SERPL-SCNC: 4.4 MMOL/L — SIGNIFICANT CHANGE UP (ref 3.5–5.3)
PROTHROM AB SERPL-ACNC: 32.3 SEC — HIGH (ref 10.6–13.6)
RBC # BLD: 3.09 M/UL — LOW (ref 4.2–5.8)
RBC # FLD: 15.1 % — HIGH (ref 10.3–14.5)
SARS-COV-2 RNA SPEC QL NAA+PROBE: SIGNIFICANT CHANGE UP
SODIUM SERPL-SCNC: 136 MMOL/L — SIGNIFICANT CHANGE UP (ref 135–145)
WBC # BLD: 11.7 K/UL — HIGH (ref 3.8–10.5)
WBC # FLD AUTO: 11.7 K/UL — HIGH (ref 3.8–10.5)

## 2020-11-29 PROCEDURE — 71045 X-RAY EXAM CHEST 1 VIEW: CPT | Mod: 26

## 2020-11-29 PROCEDURE — 99233 SBSQ HOSP IP/OBS HIGH 50: CPT

## 2020-11-29 PROCEDURE — 93010 ELECTROCARDIOGRAM REPORT: CPT

## 2020-11-29 RX ORDER — MAGNESIUM SULFATE 500 MG/ML
2 VIAL (ML) INJECTION ONCE
Refills: 0 | Status: COMPLETED | OUTPATIENT
Start: 2020-11-29 | End: 2020-11-29

## 2020-11-29 RX ORDER — WARFARIN SODIUM 2.5 MG/1
1 TABLET ORAL ONCE
Refills: 0 | Status: COMPLETED | OUTPATIENT
Start: 2020-11-29 | End: 2020-11-29

## 2020-11-29 RX ADMIN — Medication 50 GRAM(S): at 12:42

## 2020-11-29 RX ADMIN — ATORVASTATIN CALCIUM 40 MILLIGRAM(S): 80 TABLET, FILM COATED ORAL at 22:00

## 2020-11-29 RX ADMIN — CHLORHEXIDINE GLUCONATE 1 APPLICATION(S): 213 SOLUTION TOPICAL at 08:32

## 2020-11-29 RX ADMIN — QUETIAPINE FUMARATE 25 MILLIGRAM(S): 200 TABLET, FILM COATED ORAL at 22:00

## 2020-11-29 RX ADMIN — Medication 500 MILLIGRAM(S): at 08:32

## 2020-11-29 RX ADMIN — Medication 5 MILLIGRAM(S): at 22:00

## 2020-11-29 RX ADMIN — Medication 650 MILLIGRAM(S): at 23:00

## 2020-11-29 RX ADMIN — GABAPENTIN 100 MILLIGRAM(S): 400 CAPSULE ORAL at 22:00

## 2020-11-29 RX ADMIN — Medication 1 MILLIGRAM(S): at 08:32

## 2020-11-29 RX ADMIN — Medication 5 MILLIGRAM(S): at 08:37

## 2020-11-29 RX ADMIN — Medication 650 MILLIGRAM(S): at 22:00

## 2020-11-29 RX ADMIN — PANTOPRAZOLE SODIUM 40 MILLIGRAM(S): 20 TABLET, DELAYED RELEASE ORAL at 05:14

## 2020-11-29 RX ADMIN — PANTOPRAZOLE SODIUM 40 MILLIGRAM(S): 20 TABLET, DELAYED RELEASE ORAL at 17:08

## 2020-11-29 RX ADMIN — WARFARIN SODIUM 1 MILLIGRAM(S): 2.5 TABLET ORAL at 22:00

## 2020-11-29 RX ADMIN — Medication 50 MILLIGRAM(S): at 17:08

## 2020-11-29 RX ADMIN — SODIUM CHLORIDE 3 MILLILITER(S): 9 INJECTION INTRAMUSCULAR; INTRAVENOUS; SUBCUTANEOUS at 13:09

## 2020-11-29 RX ADMIN — Medication 50 MILLIGRAM(S): at 12:42

## 2020-11-29 RX ADMIN — AMIODARONE HYDROCHLORIDE 200 MILLIGRAM(S): 400 TABLET ORAL at 05:14

## 2020-11-29 RX ADMIN — Medication 5 MILLIGRAM(S): at 12:42

## 2020-11-29 RX ADMIN — LEVETIRACETAM 750 MILLIGRAM(S): 250 TABLET, FILM COATED ORAL at 17:08

## 2020-11-29 RX ADMIN — SODIUM CHLORIDE 3 MILLILITER(S): 9 INJECTION INTRAMUSCULAR; INTRAVENOUS; SUBCUTANEOUS at 05:13

## 2020-11-29 RX ADMIN — SODIUM CHLORIDE 3 MILLILITER(S): 9 INJECTION INTRAMUSCULAR; INTRAVENOUS; SUBCUTANEOUS at 22:02

## 2020-11-29 RX ADMIN — Medication 81 MILLIGRAM(S): at 08:31

## 2020-11-29 RX ADMIN — LEVETIRACETAM 750 MILLIGRAM(S): 250 TABLET, FILM COATED ORAL at 05:14

## 2020-11-29 RX ADMIN — Medication 50 MILLIGRAM(S): at 05:14

## 2020-11-29 RX ADMIN — Medication 1 TABLET(S): at 08:32

## 2020-11-29 NOTE — PROGRESS NOTE ADULT - SUBJECTIVE AND OBJECTIVE BOX
[facility] DIVISION OF KIDNEY DISEASES AND HYPERTENSION -- FOLLOW UP NOTE  --------------------------------------------------------------------------------  Chief Complaint: Deven on HD  24 hour events/subjective:  No acute event  Pt states he feels well        PAST HISTORY  --------------------------------------------------------------------------------  No significant changes to PMH, PSH, FHx, SHx, unless otherwise noted    ALLERGIES & MEDICATIONS  --------------------------------------------------------------------------------  Allergies    penicillin (Unknown)    Intolerances      Standing Inpatient Medications  aMIOdarone    Tablet   Oral   aMIOdarone    Tablet 200 milliGRAM(s) Oral daily  ascorbic acid 500 milliGRAM(s) Oral daily  aspirin enteric coated 81 milliGRAM(s) Oral daily  atorvastatin 40 milliGRAM(s) Oral at bedtime  chlorhexidine 2% Cloths 1 Application(s) Topical daily  epoetin yolanda-epbx (RETACRIT) Injectable 75188 Unit(s) IV Push <User Schedule>  folic acid 1 milliGRAM(s) Oral daily  gabapentin 100 milliGRAM(s) Oral at bedtime  levETIRAcetam 750 milliGRAM(s) Oral two times a day  melatonin 5 milliGRAM(s) Oral at bedtime  methylphenidate 5 milliGRAM(s) Oral <User Schedule>  metoprolol tartrate 50 milliGRAM(s) Oral every 6 hours  multivitamin 1 Tablet(s) Oral daily  pantoprazole    Tablet 40 milliGRAM(s) Oral two times a day  QUEtiapine 25 milliGRAM(s) Oral at bedtime  sodium chloride 0.9% lock flush 3 milliLiter(s) IV Push every 8 hours  warfarin 1 milliGRAM(s) Oral once    PRN Inpatient Medications  acetaminophen   Tablet .. 650 milliGRAM(s) Oral every 6 hours PRN  benzocaine 15 mG/menthol 3.6 mG (Sugar-Free) Lozenge 1 Lozenge Oral three times a day PRN  lidocaine   Patch 1 Patch Transdermal every 24 hours PRN      REVIEW OF SYSTEMS  --------------------------------------------------------------------------------  Gen: No weight changes, fatigue, fevers/chills, weakness  Skin: No rashes  Head/Eyes/Ears/Mouth: No headache; Normal hearing; Normal vision w/o blurriness; No sinus pain/discomfort, sore throat  Respiratory: No dyspnea, cough, wheezing, hemoptysis  CV: No chest pain, PND, orthopnea  GI: No abdominal pain, diarrhea, constipation, nausea, vomiting, melena, hematochezia  : No increased frequency, dysuria, hematuria, nocturia  MSK: No joint pain/swelling; no back pain; no edema  Neuro: No dizziness/lightheadedness, weakness, seizures, numbness, tingling  Heme: No easy bruising or bleeding  Endo: No heat/cold intolerance  Psych: No significant nervousness, anxiety, stress, depression    All other systems were reviewed and are negative, except as noted.    VITALS/PHYSICAL EXAM  --------------------------------------------------------------------------------  T(C): 36.4 (11-29-20 @ 15:59), Max: 36.6 (11-29-20 @ 05:12)  HR: 63 (11-29-20 @ 15:59) (63 - 96)  BP: 123/77 (11-29-20 @ 15:59) (104/58 - 123/77)  RR: 17 (11-29-20 @ 15:59) (16 - 19)  SpO2: 100% (11-29-20 @ 15:59) (95% - 100%)  Wt(kg): --        11-28-20 @ 07:01  -  11-29-20 @ 07:00  --------------------------------------------------------  IN: 1040 mL / OUT: 375 mL / NET: 665 mL    11-29-20 @ 07:01  -  11-29-20 @ 17:48  --------------------------------------------------------  IN: 600 mL / OUT: 0 mL / NET: 600 mL      Physical Exam:  	Gen: NAD  	HEENT:  supple neck,  	Pulm: CTA B/L  	CV: RRR, S1S2; no rub  	Back: No spinal or CVA tenderness; no sacral edema  	Abd: +BS, soft, nontender/nondistended  	: No suprapubic tenderness  	UE: Warm,  no edema; no asterixis  	LE: Warm; no edema  	Neuro: No focal deficits  	Psych: Normal affect and mood  	Skin: Warm, without rashes  	Vascular access: RIJ tunneled HD catheter+    LABS/STUDIES  --------------------------------------------------------------------------------              9.2    11.70 >-----------<  185      [11-29-20 @ 06:55]              29.7     136  |  98  |  62.0  ----------------------------<  86      [11-29-20 @ 06:55]  4.4   |  26.0  |  4.87        Ca     7.9     [11-29-20 @ 06:55]      iCa    1.06     [11-28 @ 06:37]      Mg     1.7     [11-29-20 @ 06:55]      Phos  2.5     [11-29-20 @ 06:55]      PT/INR: PT 32.3 , INR 2.93       [11-29-20 @ 06:55]      Creatinine Trend:  SCr 4.87 [11-29 @ 06:55]  SCr 4.56 [11-28 @ 06:37]  SCr 5.63 [11-27 @ 03:11]  SCr 5.05 [11-26 @ 02:42]  SCr 5.14 [11-25 @ 18:47]        Iron 29, TIBC 332, %sat 9      [11-03-20 @ 08:18]  Ferritin 230      [11-03-20 @ 08:18]  TSH 1.95      [11-03-20 @ 08:18]  Lipid: chol --, , HDL --, LDL --      [11-15-20 @ 02:40]    HBsAb <3.0      [11-07-20 @ 05:41]  HBsAg Nonreact      [11-07-20 @ 05:41]  HBcAb Nonreact      [11-07-20 @ 05:41]  HCV 0.07, Nonreact      [11-07-20 @ 05:41]

## 2020-11-29 NOTE — PROGRESS NOTE ADULT - PROBLEM SELECTOR PLAN 9
SCDs, Coumadin for DVT prophylaxis.  Protonix for GI prophylaxis.    Plan of care to be discussed further with CT Surgery attending / team on AM rounds.  Possible discharge to Acute Rehab early this week.

## 2020-11-29 NOTE — CHART NOTE - NSCHARTNOTEFT_GEN_A_CORE
60yMale POD #16  11/13 s/p resternotomy, CABG x 2 (SVG to OM, RPDA), AVR, MVR.   Post-op course notable for cardiogenic shock now resolved, acute blood loss anemia (improved), acute hypoxemic respiratory failure, junctional rhythm (with periods of ventricular standstill) now resolved (NSR), Supratherapeutic INR.    Pt OOB to the chair states, "He feels fine". Patient denies acute pain with radiating or aggravating factors.  He denies chest pain, shortness of breath, palpitations, headache, dizziness, nausea, or vomiting.        Physical Exam  Neuro: A+O x 3, non-focal, speech slightly garbled at times  Pulm: B/L diminished lung sounds with crackles appreciated at the bases  CV: +S1S2  Abd: soft, NT, ND, +BS  Ext: +DP Pulses b/l, +2 LE edema  Inc: MSI C/D/I/stable w/ dressing w/ OPsite , Left inner thigh C/D/I with OPtsite  Lines: Right IJ tunneled catheter with bio patch dressing C/D/I    PLAN:  Mitral and aortic regurgitation.    s/p CABG AVR/ MVR  NSR Cont. Lopressor 50 mg q6 hrs and PO Amio   continue aspirin for acute graft closure prophylaxis  continue statin for chronic graft patency prophylaxis  Encourage coughing and deep breathing exercises/incentive spirometry   continue to encourage nocturnal BiPAP (pt was refusing nightly)  Ambulate with PT assist as tolerated. Pt ambulating to the bathroom with walker and 1 person assist. pt agreeable today to ambulate with physical therapy   Pain control cont. Tylenol, gabapentin, and lidocaine patch, and warm packs  Wires cut    Atrial flutter converted to rate controlled flutter/fib overnight   Continue Amio 200 daily  Continue Lopressor 50 mg q6  Monitor EKG      Anemia due to other cause.   continue to trend daily CBC for now. H and H stable 9.2 and 29  Continue Epogetin with HD  Transfuse PRN  continue MVT, Folic acid  and B12        ESRD   s/p permacath on 11/20  HD 11/30 as per nephrology  HD today no fluid removal as per Renal team  No plans currently for AVF as per Vascular Surgery 11/21 note  adjust meds per CrCl  Hold nephrotoxic meds  bladder scan daily 90cc 11/29 in am    Trend BMP.     Acute diastolic heart failure   supportive care.     Supratherapeutic INR  Continue daily INR and Coumadin for goal INR 1.5-2.0.  Vitamin K 1 mg 11/27  current INR 3.25 11/29  coumadin 1mg tonight 11/29 as per Tosin      CVA (cerebral vascular accident).  Hx of CVA   Continue ASA and atorvastatin  PT/OT   PM&R following,  plan for Acute rehab 11/30    Seizures  Continue Keppra.     Need for prophylactic measure  SCDs, Coumadin for DVT prophylaxis  Protonix for GI prophylaxis.    Dispo Acute Rehab MONDAY 11/30    Plan discussed with Dr. Leahy in AM rounds

## 2020-11-29 NOTE — PROGRESS NOTE ADULT - SUBJECTIVE AND OBJECTIVE BOX
POD # 16 s/p reop AVR (tissue), MV replacement (tissue), CABG X 2    HPI:  Patient is a 60 yoM with PMH of CAD s/p stent in LAD, leaky aortic valve, uncontrolled bp, thoracic aortic dissection requiring emergent surgery, CVA with left sided sensory loss, colostomy for bowel ischemia s/p reversal, psoriasis on Humira injections presents from home with sob and le edema. Patient states the past several weeks, he has not been taking his medications because he forgot. Patient states the sob has gotten worse over the past few days and also associated with chest pressure. Patient also hasnt seen his cardiologist in several months. Patient worked up in the er and had an elevated bnp and htn urgency and santos. Patient seen at bedside and states feeling a little better.  (02 Nov 2020 14:32)    PAST MEDICAL & SURGICAL HISTORY:  CVA (cerebral vascular accident)  CHF (congestive heart failure)  H/O colectomy  Aorta disorder    FAMILY HISTORY:  FH: hypertension    Brief Hospital Course: Ultimately, patient arrived with acute diastolic heart failure, moderate-severe MR, moderate TR, mod-severe AI, SANTOS on CKD requiring HD (now with RIJ permacath placed 11/20), and anemia (likely d/t chronic renal disease, GI w/u unremarkable). LHC revealed severe 2 vessel CAD and known residual Type B dissection. Patient underwent resternotomy, CABG x 2 (SVG to OM, RPDA), AVR, MVR on 11/13 with Dr. Butler. Intra-op acute blood loss anemia and coagulopathy noted. Post-operative course notable for cardiogenic shock (resolved), acute blood loss anemia (improved), acute hypoxemic respiratory failure (resolved), junctional rhythm with periods of ventricular standstill (now resolved, rhythm remains sinus tach 100s), and Supratherapeutic INR.    Subjective: "My back still hurts, and I'm frustrated, and I can't sleep". Denies fevers, chills, lightheadedness, dizziness, HA, CP, palpitations, SOB, abdominal pain, N/V, diarrhea, numbness/tingling in extremities, or any other acute complaints.    MEDICATIONS  (STANDING):  aMIOdarone    Tablet 200 milliGRAM(s) Oral daily  ascorbic acid 500 milliGRAM(s) Oral daily  aspirin enteric coated 81 milliGRAM(s) Oral daily  atorvastatin 40 milliGRAM(s) Oral at bedtime  chlorhexidine 2% Cloths 1 Application(s) Topical daily  epoetin yolanda-epbx (RETACRIT) Injectable 63543 Unit(s) IV Push <User Schedule>  folic acid 1 milliGRAM(s) Oral daily  gabapentin 100 milliGRAM(s) Oral at bedtime  levETIRAcetam 750 milliGRAM(s) Oral two times a day  melatonin 5 milliGRAM(s) Oral at bedtime  methylphenidate 5 milliGRAM(s) Oral <User Schedule>  metoprolol tartrate 50 milliGRAM(s) Oral every 6 hours  multivitamin 1 Tablet(s) Oral daily  pantoprazole    Tablet 40 milliGRAM(s) Oral two times a day  QUEtiapine 25 milliGRAM(s) Oral at bedtime  sodium chloride 0.9% lock flush 3 milliLiter(s) IV Push every 8 hours    MEDICATIONS  (PRN):  acetaminophen   Tablet .. 650 milliGRAM(s) Oral every 6 hours PRN Mild Pain (1 - 3)  benzocaine 15 mG/menthol 3.6 mG (Sugar-Free) Lozenge 1 Lozenge Oral three times a day PRN Sore Throat  lidocaine   Patch 1 Patch Transdermal every 24 hours PRN as needed    Allergies: penicillin (Unknown)    Vitals   T(C): 36.4 (28 Nov 2020 21:50), Max: 36.6 (28 Nov 2020 10:22)  T(F): 97.6 (28 Nov 2020 21:50), Max: 97.9 (28 Nov 2020 10:22)  HR: 96 (28 Nov 2020 23:23) (82 - 97)  BP: 106/70 (28 Nov 2020 23:23) (105/65 - 113/66)  RR: 17 (28 Nov 2020 23:23) (16 - 19)  SpO2: 95% (28 Nov 2020 23:23) (95% - 98%)    I&O's Detail    27 Nov 2020 07:01  -  28 Nov 2020 07:00  --------------------------------------------------------  IN:    Oral Fluid: 240 mL  Total IN: 240 mL    OUT:    Other (mL): 0 mL  Total OUT: 0 mL    Total NET: 240 mL      28 Nov 2020 07:01  -  29 Nov 2020 02:31  --------------------------------------------------------  IN:    Oral Fluid: 1040 mL  Total IN: 1040 mL    OUT:    Voided (mL): 375 mL  Total OUT: 375 mL    Total NET: 665 mL        LABS                        8.9    13.31 )-----------( 192      ( 28 Nov 2020 06:37 )             28.6     11-28    136  |  98  |  52.0<H>  ----------------------------<  97  4.2   |  26.0  |  4.56<H>    Ca    7.9<L>      28 Nov 2020 06:37  Phos  3.0     11-28  Mg     1.8     11-28    PT/INR - ( 28 Nov 2020 06:37 )   PT: 35.6 sec;   INR: 3.25 ratio       Last CXR:  < from: Xray Chest 1 View- PORTABLE-Routine (Xray Chest 1 View- PORTABLE-Routine in AM.) (11.27.20 @ 07:03) >  Frontal expiratory view of the chest shows the heart to be similarly enlarged in size. Right dialysis catheter and mitral valve ring are again noted.  The lungs are clear and there is no evidence of pneumothorax nor definite pleural effusion.  IMPRESSION:  No pleural effusion identified.  < end of copied text >

## 2020-11-29 NOTE — PROGRESS NOTE ADULT - SUBJECTIVE AND OBJECTIVE BOX
[facility], [facility]                                                    [address] , [address], [place] [address]                                                         PHONE: [phone]    FAX: [phone] [address] [facility], [address], [place] [address]                                                 PHONE: [phone]    FAX: [phone]  *******************************************************************************    Overnight events/Subjective Assessment:    INTERPRETATION OF TELEMETRY (personally reviewed):    penicillin (Unknown)    MEDICATIONS  (STANDING):  aMIOdarone    Tablet   Oral   aMIOdarone    Tablet 200 milliGRAM(s) Oral daily  ascorbic acid 500 milliGRAM(s) Oral daily  aspirin enteric coated 81 milliGRAM(s) Oral daily  atorvastatin 40 milliGRAM(s) Oral at bedtime  chlorhexidine 2% Cloths 1 Application(s) Topical daily  epoetin yolanda-epbx (RETACRIT) Injectable 24180 Unit(s) IV Push <User Schedule>  folic acid 1 milliGRAM(s) Oral daily  gabapentin 100 milliGRAM(s) Oral at bedtime  levETIRAcetam 750 milliGRAM(s) Oral two times a day  magnesium sulfate  IVPB 2 Gram(s) IV Intermittent once  melatonin 5 milliGRAM(s) Oral at bedtime  methylphenidate 5 milliGRAM(s) Oral <User Schedule>  metoprolol tartrate 50 milliGRAM(s) Oral every 6 hours  multivitamin 1 Tablet(s) Oral daily  pantoprazole    Tablet 40 milliGRAM(s) Oral two times a day  QUEtiapine 25 milliGRAM(s) Oral at bedtime  sodium chloride 0.9% lock flush 3 milliLiter(s) IV Push every 8 hours  warfarin 1 milliGRAM(s) Oral once    MEDICATIONS  (PRN):  acetaminophen   Tablet .. 650 milliGRAM(s) Oral every 6 hours PRN Mild Pain (1 - 3)  benzocaine 15 mG/menthol 3.6 mG (Sugar-Free) Lozenge 1 Lozenge Oral three times a day PRN Sore Throat  lidocaine   Patch 1 Patch Transdermal every 24 hours PRN as needed      Vital Signs Last 24 Hrs  T(C): 36.4 (29 Nov 2020 11:30), Max: 36.6 (29 Nov 2020 05:12)  T(F): 97.6 (29 Nov 2020 11:30), Max: 97.9 (29 Nov 2020 05:12)  HR: 89 (29 Nov 2020 12:00) (82 - 96)  BP: 109/68 (29 Nov 2020 12:00) (104/58 - 114/71)  BP(mean): --  RR: 17 (29 Nov 2020 11:30) (16 - 19)  SpO2: 98% (29 Nov 2020 11:30) (95% - 98%)    I&O's Detail    28 Nov 2020 07:01  -  29 Nov 2020 07:00  --------------------------------------------------------  IN:    Oral Fluid: 1040 mL  Total IN: 1040 mL    OUT:    Voided (mL): 375 mL  Total OUT: 375 mL    Total NET: 665 mL        I&O's Summary    28 Nov 2020 07:01  -  29 Nov 2020 07:00  --------------------------------------------------------  IN: 1040 mL / OUT: 375 mL / NET: 665 mL            PHYSICAL EXAM:  General: Appears well developed, well nourished, no acute distress  HEENT: Head: normocephalic, atraumatic  Eyes: Pupils equal and reactive  Neck: Supple, no carotid bruit, no JVD, no HJR  CARDIOVASCULAR: Normal S1 and S2, no murmur, rub, or gallop  LUNGS: Clear to auscultation bilaterally, no rales, rhonchi or wheeze  ABDOMEN: Soft, nontender, non-distended, positive bowel sounds, no mass or bruit  EXTREMITIES: No edema, distal pulses WNL  SKIN: Warm and dry with normal turgor  NEURO: Alert & oriented x 3, grossly intact  PSYCH: normal mood and affect        LABS:                        9.2    11.70 )-----------( 185      ( 29 Nov 2020 06:55 )             29.7     11-29    136  |  98  |  62.0<H>  ----------------------------<  86  4.4   |  26.0  |  4.87<H>    Ca    7.9<L>      29 Nov 2020 06:55  Phos  2.5     11-29  Mg     1.7     11-29          PT/INR - ( 29 Nov 2020 06:55 )   PT: 32.3 sec;   INR: 2.93 ratio           serum  Lipids:     Thyroid Stimulating Hormone, Serum: 1.95 uIU/mL (11-03 @ 08:18)      RADIOLOGY & ADDITIONAL STUDIES:    ECG:    ECHO:    STRESS TEST:    CARDIAC CATHETERIZATION:    ASSESSMENT AND PLAN:  In summary, JONATHAN GIBSON is a 60y Male with past medical history significant for CABG/AVR, ARF/CRI, CVA, HFNEF, slowly improving  - PT/OT  - cont current meds  - medicine to check and supplement electrolytes  - INR as per medicine  - Meds reviewed      Ramon Quach DO

## 2020-11-29 NOTE — PROGRESS NOTE ADULT - PROBLEM SELECTOR PLAN 2
Continue Amio 200 daily.  Continue Lopressor 50 mg q6.  Remains in NSR.  Continuous telemetry.  Cardiology following.

## 2020-11-30 ENCOUNTER — INPATIENT (INPATIENT)
Facility: HOSPITAL | Age: 60
LOS: 14 days | Discharge: HOME HEALTH | DRG: 949 | End: 2020-12-15
Attending: SPECIALIST | Admitting: SPECIALIST
Payer: MEDICARE

## 2020-11-30 ENCOUNTER — TRANSCRIPTION ENCOUNTER (OUTPATIENT)
Age: 60
End: 2020-11-30

## 2020-11-30 VITALS
SYSTOLIC BLOOD PRESSURE: 136 MMHG | HEIGHT: 68 IN | WEIGHT: 212.97 LBS | TEMPERATURE: 98 F | DIASTOLIC BLOOD PRESSURE: 82 MMHG | OXYGEN SATURATION: 95 % | RESPIRATION RATE: 16 BRPM | HEART RATE: 92 BPM

## 2020-11-30 VITALS
TEMPERATURE: 98 F | RESPIRATION RATE: 18 BRPM | OXYGEN SATURATION: 97 % | HEART RATE: 87 BPM | DIASTOLIC BLOOD PRESSURE: 70 MMHG | SYSTOLIC BLOOD PRESSURE: 123 MMHG

## 2020-11-30 DIAGNOSIS — Z48.812 ENCOUNTER FOR SURGICAL AFTERCARE FOLLOWING SURGERY ON THE CIRCULATORY SYSTEM: ICD-10-CM

## 2020-11-30 DIAGNOSIS — R41.0 DISORIENTATION, UNSPECIFIED: ICD-10-CM

## 2020-11-30 DIAGNOSIS — L40.9 PSORIASIS, UNSPECIFIED: ICD-10-CM

## 2020-11-30 DIAGNOSIS — Z95.1 PRESENCE OF AORTOCORONARY BYPASS GRAFT: ICD-10-CM

## 2020-11-30 DIAGNOSIS — D63.1 ANEMIA IN CHRONIC KIDNEY DISEASE: ICD-10-CM

## 2020-11-30 DIAGNOSIS — R41.844 FRONTAL LOBE AND EXECUTIVE FUNCTION DEFICIT: ICD-10-CM

## 2020-11-30 DIAGNOSIS — Z99.2 DEPENDENCE ON RENAL DIALYSIS: ICD-10-CM

## 2020-11-30 DIAGNOSIS — K64.4 RESIDUAL HEMORRHOIDAL SKIN TAGS: ICD-10-CM

## 2020-11-30 DIAGNOSIS — R53.81 OTHER MALAISE: ICD-10-CM

## 2020-11-30 DIAGNOSIS — I77.9 DISORDER OF ARTERIES AND ARTERIOLES, UNSPECIFIED: Chronic | ICD-10-CM

## 2020-11-30 DIAGNOSIS — Z90.49 ACQUIRED ABSENCE OF OTHER SPECIFIED PARTS OF DIGESTIVE TRACT: Chronic | ICD-10-CM

## 2020-11-30 DIAGNOSIS — Z87.891 PERSONAL HISTORY OF NICOTINE DEPENDENCE: ICD-10-CM

## 2020-11-30 DIAGNOSIS — R41.842 VISUOSPATIAL DEFICIT: ICD-10-CM

## 2020-11-30 DIAGNOSIS — I69.344 MONOPLEGIA OF LOWER LIMB FOLLOWING CEREBRAL INFARCTION AFFECTING LEFT NON-DOMINANT SIDE: ICD-10-CM

## 2020-11-30 DIAGNOSIS — F41.9 ANXIETY DISORDER, UNSPECIFIED: ICD-10-CM

## 2020-11-30 DIAGNOSIS — Z95.3 PRESENCE OF XENOGENIC HEART VALVE: ICD-10-CM

## 2020-11-30 DIAGNOSIS — E44.0 MODERATE PROTEIN-CALORIE MALNUTRITION: ICD-10-CM

## 2020-11-30 DIAGNOSIS — Z51.89 ENCOUNTER FOR OTHER SPECIFIED AFTERCARE: ICD-10-CM

## 2020-11-30 DIAGNOSIS — N18.6 END STAGE RENAL DISEASE: ICD-10-CM

## 2020-11-30 DIAGNOSIS — R41.3 OTHER AMNESIA: ICD-10-CM

## 2020-11-30 DIAGNOSIS — I50.40 UNSPECIFIED COMBINED SYSTOLIC (CONGESTIVE) AND DIASTOLIC (CONGESTIVE) HEART FAILURE: ICD-10-CM

## 2020-11-30 DIAGNOSIS — E78.5 HYPERLIPIDEMIA, UNSPECIFIED: ICD-10-CM

## 2020-11-30 DIAGNOSIS — Z95.2 PRESENCE OF PROSTHETIC HEART VALVE: ICD-10-CM

## 2020-11-30 DIAGNOSIS — I48.92 UNSPECIFIED ATRIAL FLUTTER: ICD-10-CM

## 2020-11-30 DIAGNOSIS — R26.9 UNSPECIFIED ABNORMALITIES OF GAIT AND MOBILITY: ICD-10-CM

## 2020-11-30 DIAGNOSIS — R20.0 ANESTHESIA OF SKIN: ICD-10-CM

## 2020-11-30 DIAGNOSIS — N17.0 ACUTE KIDNEY FAILURE WITH TUBULAR NECROSIS: ICD-10-CM

## 2020-11-30 DIAGNOSIS — I25.10 ATHEROSCLEROTIC HEART DISEASE OF NATIVE CORONARY ARTERY WITHOUT ANGINA PECTORIS: ICD-10-CM

## 2020-11-30 DIAGNOSIS — R56.9 UNSPECIFIED CONVULSIONS: ICD-10-CM

## 2020-11-30 DIAGNOSIS — R41.840 ATTENTION AND CONCENTRATION DEFICIT: ICD-10-CM

## 2020-11-30 DIAGNOSIS — R05 COUGH: ICD-10-CM

## 2020-11-30 DIAGNOSIS — M54.9 DORSALGIA, UNSPECIFIED: ICD-10-CM

## 2020-11-30 DIAGNOSIS — K62.5 HEMORRHAGE OF ANUS AND RECTUM: ICD-10-CM

## 2020-11-30 DIAGNOSIS — R60.0 LOCALIZED EDEMA: ICD-10-CM

## 2020-11-30 DIAGNOSIS — Z95.5 PRESENCE OF CORONARY ANGIOPLASTY IMPLANT AND GRAFT: ICD-10-CM

## 2020-11-30 DIAGNOSIS — R29.898 OTHER SYMPTOMS AND SIGNS INVOLVING THE MUSCULOSKELETAL SYSTEM: ICD-10-CM

## 2020-11-30 DIAGNOSIS — I11.0 HYPERTENSIVE HEART DISEASE WITH HEART FAILURE: ICD-10-CM

## 2020-11-30 LAB
ANION GAP SERPL CALC-SCNC: 15 MMOL/L — SIGNIFICANT CHANGE UP (ref 5–17)
BUN SERPL-MCNC: 76 MG/DL — HIGH (ref 8–20)
CALCIUM SERPL-MCNC: 8 MG/DL — LOW (ref 8.6–10.2)
CHLORIDE SERPL-SCNC: 98 MMOL/L — SIGNIFICANT CHANGE UP (ref 98–107)
CO2 SERPL-SCNC: 23 MMOL/L — SIGNIFICANT CHANGE UP (ref 22–29)
CREAT SERPL-MCNC: 5.17 MG/DL — HIGH (ref 0.5–1.3)
GLUCOSE SERPL-MCNC: 96 MG/DL — SIGNIFICANT CHANGE UP (ref 70–99)
HCT VFR BLD CALC: 28.2 % — LOW (ref 39–50)
HGB BLD-MCNC: 8.7 G/DL — LOW (ref 13–17)
INR BLD: 2.52 RATIO — HIGH (ref 0.88–1.16)
MAGNESIUM SERPL-MCNC: 2 MG/DL — SIGNIFICANT CHANGE UP (ref 1.6–2.6)
MCHC RBC-ENTMCNC: 29.5 PG — SIGNIFICANT CHANGE UP (ref 27–34)
MCHC RBC-ENTMCNC: 30.9 GM/DL — LOW (ref 32–36)
MCV RBC AUTO: 95.6 FL — SIGNIFICANT CHANGE UP (ref 80–100)
PHOSPHATE SERPL-MCNC: 2.6 MG/DL — SIGNIFICANT CHANGE UP (ref 2.4–4.7)
PLATELET # BLD AUTO: 193 K/UL — SIGNIFICANT CHANGE UP (ref 150–400)
POTASSIUM SERPL-MCNC: 4.4 MMOL/L — SIGNIFICANT CHANGE UP (ref 3.5–5.3)
POTASSIUM SERPL-SCNC: 4.4 MMOL/L — SIGNIFICANT CHANGE UP (ref 3.5–5.3)
PROTHROM AB SERPL-ACNC: 28 SEC — HIGH (ref 10.6–13.6)
RBC # BLD: 2.95 M/UL — LOW (ref 4.2–5.8)
RBC # FLD: 15.3 % — HIGH (ref 10.3–14.5)
SODIUM SERPL-SCNC: 136 MMOL/L — SIGNIFICANT CHANGE UP (ref 135–145)
WBC # BLD: 14.29 K/UL — HIGH (ref 3.8–10.5)
WBC # FLD AUTO: 14.29 K/UL — HIGH (ref 3.8–10.5)

## 2020-11-30 PROCEDURE — 99233 SBSQ HOSP IP/OBS HIGH 50: CPT

## 2020-11-30 PROCEDURE — 90937 HEMODIALYSIS REPEATED EVAL: CPT

## 2020-11-30 PROCEDURE — 71045 X-RAY EXAM CHEST 1 VIEW: CPT | Mod: 26,77

## 2020-11-30 PROCEDURE — 71045 X-RAY EXAM CHEST 1 VIEW: CPT | Mod: 26

## 2020-11-30 RX ORDER — BENZOCAINE AND MENTHOL 5; 1 G/100ML; G/100ML
0 LIQUID ORAL
Qty: 0 | Refills: 0 | DISCHARGE
Start: 2020-11-30

## 2020-11-30 RX ORDER — WARFARIN SODIUM 2.5 MG/1
1 TABLET ORAL ONCE
Refills: 0 | Status: COMPLETED | OUTPATIENT
Start: 2020-11-30 | End: 2020-11-30

## 2020-11-30 RX ORDER — ADALIMUMAB 40MG/0.8ML
0 KIT SUBCUTANEOUS
Qty: 0 | Refills: 0 | DISCHARGE

## 2020-11-30 RX ORDER — GABAPENTIN 400 MG/1
1 CAPSULE ORAL
Qty: 0 | Refills: 0 | DISCHARGE
Start: 2020-11-30

## 2020-11-30 RX ORDER — OMEPRAZOLE 10 MG/1
1 CAPSULE, DELAYED RELEASE ORAL
Qty: 0 | Refills: 0 | DISCHARGE

## 2020-11-30 RX ORDER — LEVETIRACETAM 250 MG/1
1 TABLET, FILM COATED ORAL
Qty: 0 | Refills: 0 | DISCHARGE

## 2020-11-30 RX ORDER — QUETIAPINE FUMARATE 200 MG/1
1 TABLET, FILM COATED ORAL
Qty: 0 | Refills: 0 | DISCHARGE
Start: 2020-11-30

## 2020-11-30 RX ORDER — IRON SUCROSE 20 MG/ML
100 INJECTION, SOLUTION INTRAVENOUS
Refills: 0 | Status: DISCONTINUED | OUTPATIENT
Start: 2020-11-30 | End: 2020-11-30

## 2020-11-30 RX ORDER — METOPROLOL TARTRATE 50 MG
50 TABLET ORAL EVERY 8 HOURS
Refills: 0 | Status: DISCONTINUED | OUTPATIENT
Start: 2020-11-30 | End: 2020-12-01

## 2020-11-30 RX ORDER — LIDOCAINE 4 G/100G
1 CREAM TOPICAL
Qty: 0 | Refills: 0 | DISCHARGE
Start: 2020-11-30

## 2020-11-30 RX ORDER — LANOLIN ALCOHOL/MO/W.PET/CERES
6 CREAM (GRAM) TOPICAL AT BEDTIME
Refills: 0 | Status: DISCONTINUED | OUTPATIENT
Start: 2020-11-30 | End: 2020-12-15

## 2020-11-30 RX ORDER — ALBUTEROL 90 UG/1
2 AEROSOL, METERED ORAL EVERY 6 HOURS
Refills: 0 | Status: DISCONTINUED | OUTPATIENT
Start: 2020-11-30 | End: 2020-12-15

## 2020-11-30 RX ORDER — ERYTHROPOIETIN 10000 [IU]/ML
0 INJECTION, SOLUTION INTRAVENOUS; SUBCUTANEOUS
Qty: 0 | Refills: 0 | DISCHARGE
Start: 2020-11-30

## 2020-11-30 RX ORDER — QUETIAPINE FUMARATE 200 MG/1
25 TABLET, FILM COATED ORAL AT BEDTIME
Refills: 0 | Status: DISCONTINUED | OUTPATIENT
Start: 2020-11-30 | End: 2020-12-08

## 2020-11-30 RX ORDER — AMIODARONE HYDROCHLORIDE 400 MG/1
1 TABLET ORAL
Qty: 0 | Refills: 0 | DISCHARGE
Start: 2020-11-30

## 2020-11-30 RX ORDER — ASCORBIC ACID 60 MG
500 TABLET,CHEWABLE ORAL DAILY
Refills: 0 | Status: DISCONTINUED | OUTPATIENT
Start: 2020-11-30 | End: 2020-12-15

## 2020-11-30 RX ORDER — PANTOPRAZOLE SODIUM 20 MG/1
40 TABLET, DELAYED RELEASE ORAL
Refills: 0 | Status: DISCONTINUED | OUTPATIENT
Start: 2020-11-30 | End: 2020-12-15

## 2020-11-30 RX ORDER — GABAPENTIN 400 MG/1
100 CAPSULE ORAL AT BEDTIME
Refills: 0 | Status: DISCONTINUED | OUTPATIENT
Start: 2020-11-30 | End: 2020-12-15

## 2020-11-30 RX ORDER — ASCORBIC ACID 60 MG
1 TABLET,CHEWABLE ORAL
Qty: 0 | Refills: 0 | DISCHARGE
Start: 2020-11-30

## 2020-11-30 RX ORDER — LISINOPRIL 2.5 MG/1
1 TABLET ORAL
Qty: 0 | Refills: 0 | DISCHARGE

## 2020-11-30 RX ORDER — ATORVASTATIN CALCIUM 80 MG/1
40 TABLET, FILM COATED ORAL AT BEDTIME
Refills: 0 | Status: DISCONTINUED | OUTPATIENT
Start: 2020-11-30 | End: 2020-12-15

## 2020-11-30 RX ORDER — BENZOCAINE AND MENTHOL 5; 1 G/100ML; G/100ML
1 LIQUID ORAL
Refills: 0 | Status: DISCONTINUED | OUTPATIENT
Start: 2020-11-30 | End: 2020-12-15

## 2020-11-30 RX ORDER — ACETAMINOPHEN 500 MG
2 TABLET ORAL
Qty: 0 | Refills: 0 | DISCHARGE
Start: 2020-11-30

## 2020-11-30 RX ORDER — FOLIC ACID 0.8 MG
1 TABLET ORAL
Qty: 0 | Refills: 0 | DISCHARGE

## 2020-11-30 RX ORDER — METOPROLOL TARTRATE 50 MG
50 TABLET ORAL EVERY 8 HOURS
Refills: 0 | Status: DISCONTINUED | OUTPATIENT
Start: 2020-11-30 | End: 2020-11-30

## 2020-11-30 RX ORDER — AMLODIPINE BESYLATE 2.5 MG/1
1 TABLET ORAL
Qty: 0 | Refills: 0 | DISCHARGE

## 2020-11-30 RX ORDER — METHYLPHENIDATE HCL 5 MG
1 TABLET ORAL
Qty: 0 | Refills: 0 | DISCHARGE
Start: 2020-11-30

## 2020-11-30 RX ORDER — ERYTHROPOIETIN 10000 [IU]/ML
10000 INJECTION, SOLUTION INTRAVENOUS; SUBCUTANEOUS
Refills: 0 | Status: DISCONTINUED | OUTPATIENT
Start: 2020-11-30 | End: 2020-12-15

## 2020-11-30 RX ORDER — IRON SUCROSE 20 MG/ML
100 INJECTION, SOLUTION INTRAVENOUS
Refills: 0 | Status: DISCONTINUED | OUTPATIENT
Start: 2020-12-07 | End: 2020-12-15

## 2020-11-30 RX ORDER — CHOLECALCIFEROL (VITAMIN D3) 125 MCG
1 CAPSULE ORAL
Qty: 0 | Refills: 0 | DISCHARGE

## 2020-11-30 RX ORDER — METOPROLOL TARTRATE 50 MG
1 TABLET ORAL
Qty: 0 | Refills: 0 | DISCHARGE
Start: 2020-11-30

## 2020-11-30 RX ORDER — FOLIC ACID 0.8 MG
1 TABLET ORAL
Qty: 0 | Refills: 0 | DISCHARGE
Start: 2020-11-30

## 2020-11-30 RX ORDER — ASPIRIN/CALCIUM CARB/MAGNESIUM 324 MG
81 TABLET ORAL DAILY
Refills: 0 | Status: DISCONTINUED | OUTPATIENT
Start: 2020-11-30 | End: 2020-12-15

## 2020-11-30 RX ORDER — LEVETIRACETAM 250 MG/1
1 TABLET, FILM COATED ORAL
Qty: 0 | Refills: 0 | DISCHARGE
Start: 2020-11-30

## 2020-11-30 RX ORDER — LANOLIN ALCOHOL/MO/W.PET/CERES
1 CREAM (GRAM) TOPICAL
Qty: 0 | Refills: 0 | DISCHARGE
Start: 2020-11-30

## 2020-11-30 RX ORDER — ACETAMINOPHEN 500 MG
650 TABLET ORAL EVERY 6 HOURS
Refills: 0 | Status: DISCONTINUED | OUTPATIENT
Start: 2020-11-30 | End: 2020-12-15

## 2020-11-30 RX ORDER — FOLIC ACID 0.8 MG
1 TABLET ORAL DAILY
Refills: 0 | Status: DISCONTINUED | OUTPATIENT
Start: 2020-11-30 | End: 2020-12-15

## 2020-11-30 RX ORDER — AMIODARONE HYDROCHLORIDE 400 MG/1
200 TABLET ORAL DAILY
Refills: 0 | Status: DISCONTINUED | OUTPATIENT
Start: 2020-11-30 | End: 2020-12-15

## 2020-11-30 RX ORDER — PANTOPRAZOLE SODIUM 20 MG/1
1 TABLET, DELAYED RELEASE ORAL
Qty: 0 | Refills: 0 | DISCHARGE
Start: 2020-11-30

## 2020-11-30 RX ORDER — LEVETIRACETAM 250 MG/1
750 TABLET, FILM COATED ORAL
Refills: 0 | Status: DISCONTINUED | OUTPATIENT
Start: 2020-11-30 | End: 2020-12-15

## 2020-11-30 RX ORDER — ATORVASTATIN CALCIUM 80 MG/1
1 TABLET, FILM COATED ORAL
Qty: 0 | Refills: 0 | DISCHARGE
Start: 2020-11-30

## 2020-11-30 RX ORDER — ATENOLOL 25 MG/1
1 TABLET ORAL
Qty: 0 | Refills: 0 | DISCHARGE

## 2020-11-30 RX ORDER — METHYLPHENIDATE HCL 5 MG
5 TABLET ORAL
Refills: 0 | Status: DISCONTINUED | OUTPATIENT
Start: 2020-11-30 | End: 2020-12-03

## 2020-11-30 RX ORDER — INFLUENZA VIRUS VACCINE 15; 15; 15; 15 UG/.5ML; UG/.5ML; UG/.5ML; UG/.5ML
0.5 SUSPENSION INTRAMUSCULAR ONCE
Refills: 0 | Status: COMPLETED | OUTPATIENT
Start: 2020-11-30 | End: 2020-12-12

## 2020-11-30 RX ADMIN — SODIUM CHLORIDE 3 MILLILITER(S): 9 INJECTION INTRAMUSCULAR; INTRAVENOUS; SUBCUTANEOUS at 14:58

## 2020-11-30 RX ADMIN — Medication 650 MILLIGRAM(S): at 21:06

## 2020-11-30 RX ADMIN — Medication 1 MILLIGRAM(S): at 07:48

## 2020-11-30 RX ADMIN — Medication 50 MILLIGRAM(S): at 21:06

## 2020-11-30 RX ADMIN — Medication 1 TABLET(S): at 07:48

## 2020-11-30 RX ADMIN — Medication 650 MILLIGRAM(S): at 22:12

## 2020-11-30 RX ADMIN — CHLORHEXIDINE GLUCONATE 1 APPLICATION(S): 213 SOLUTION TOPICAL at 07:08

## 2020-11-30 RX ADMIN — WARFARIN SODIUM 1 MILLIGRAM(S): 2.5 TABLET ORAL at 21:06

## 2020-11-30 RX ADMIN — PANTOPRAZOLE SODIUM 40 MILLIGRAM(S): 20 TABLET, DELAYED RELEASE ORAL at 05:41

## 2020-11-30 RX ADMIN — Medication 500 MILLIGRAM(S): at 07:48

## 2020-11-30 RX ADMIN — Medication 5 MILLIGRAM(S): at 13:20

## 2020-11-30 RX ADMIN — Medication 5 MILLIGRAM(S): at 07:48

## 2020-11-30 RX ADMIN — IRON SUCROSE 210 MILLIGRAM(S): 20 INJECTION, SOLUTION INTRAVENOUS at 11:24

## 2020-11-30 RX ADMIN — Medication 6 MILLIGRAM(S): at 21:06

## 2020-11-30 RX ADMIN — AMIODARONE HYDROCHLORIDE 200 MILLIGRAM(S): 400 TABLET ORAL at 05:41

## 2020-11-30 RX ADMIN — Medication 200 MILLIGRAM(S): at 23:43

## 2020-11-30 RX ADMIN — SODIUM CHLORIDE 3 MILLILITER(S): 9 INJECTION INTRAMUSCULAR; INTRAVENOUS; SUBCUTANEOUS at 05:41

## 2020-11-30 RX ADMIN — GABAPENTIN 100 MILLIGRAM(S): 400 CAPSULE ORAL at 21:06

## 2020-11-30 RX ADMIN — ATORVASTATIN CALCIUM 40 MILLIGRAM(S): 80 TABLET, FILM COATED ORAL at 21:06

## 2020-11-30 RX ADMIN — Medication 81 MILLIGRAM(S): at 07:48

## 2020-11-30 RX ADMIN — Medication 50 MILLIGRAM(S): at 13:20

## 2020-11-30 RX ADMIN — Medication 50 MILLIGRAM(S): at 05:41

## 2020-11-30 RX ADMIN — LEVETIRACETAM 750 MILLIGRAM(S): 250 TABLET, FILM COATED ORAL at 05:41

## 2020-11-30 RX ADMIN — QUETIAPINE FUMARATE 25 MILLIGRAM(S): 200 TABLET, FILM COATED ORAL at 21:06

## 2020-11-30 RX ADMIN — Medication 50 MILLIGRAM(S): at 00:52

## 2020-11-30 RX ADMIN — Medication 650 MILLIGRAM(S): at 10:47

## 2020-11-30 RX ADMIN — ERYTHROPOIETIN 10000 UNIT(S): 10000 INJECTION, SOLUTION INTRAVENOUS; SUBCUTANEOUS at 11:51

## 2020-11-30 NOTE — PROGRESS NOTE ADULT - PROBLEM SELECTOR PROBLEM 2
Anemia due to other cause
Atrial flutter
Essential hypertension
Anemia due to other cause
Essential hypertension
Essential hypertension
Atrial flutter
Essential hypertension
Anemia due to other cause
Anemia due to other cause
Atrial flutter
Atrial flutter
Anemia due to other cause

## 2020-11-30 NOTE — PROGRESS NOTE ADULT - PROBLEM SELECTOR PROBLEM 7
CVA (cerebral vascular accident)
Need for prophylactic measure
Need for prophylactic measure
Seizures
Need for prophylactic measure
Seizures
Need for prophylactic measure
Need for prophylactic measure
CVA (cerebral vascular accident)
Need for prophylactic measure
CVA (cerebral vascular accident)
Seizures
Seizures
CVA (cerebral vascular accident)
Seizures

## 2020-11-30 NOTE — H&P ADULT - NSHPPHYSICALEXAM_GEN_ALL_CORE
Constitutional - NAD, Comfortable  HEENT - NCAT, EOMI  Neck - Supple, No limited ROM  Chest - good chest expansion, good respiratory effort, CTAB   Cardio - warm and well perfused, RRR, no murmur  Abdomen -  Soft, NTND  Extremities - No peripheral edema, No calf tenderness   Neurologic Exam:                    Cognitive -             Orientation: Awake, Alert, AAO to self, place, date, year, situation            Attention:  Days of week, recall 3 objects without cuing            Memory: Recent/ remote memory intact            Thought: process, content appropriate     Speech - Fluent, Comprehensible, No dysarthria, No aphasia      Cranial Nerves - No facial asymmetry, Tongue midline, EOMI, Shoulder shrug intact     Motor -                      LEFT    UE - ShAB 5/5, EF 5/5, EE 5/5, WE 5/5,  WNL                    RIGHT UE - ShAB 5/5, EF 5/5, EE 5/5, WE 5/5,  WNL                    LEFT    LE - HF 5/5, KE 5/5, DF 5/5, PF 5/5                    RIGHT LE - HF 5/5, KE 5/5, DF 5/5, PF 5/5        Sensory - Intact to LT bilateral     Reflexes - DTR _ / 4 , neg Ng's b/l, neg Babinski's b/l     Coordination - FTN / HTS intact     OculoVestibular -  No nystagmus  Psychiatric - Mood stable, Affect WNL  Skin on admission: Vital Signs Last 24 Hrs  T(C): 36.5 (11-30-20 @ 18:31), Max: 36.9 (11-30-20 @ 15:23)  T(F): 97.7 (11-30-20 @ 18:31), Max: 98.4 (11-30-20 @ 15:23)  HR: 92 (11-30-20 @ 18:31) (87 - 99)  BP: 136/82 (11-30-20 @ 18:31) (108/63 - 136/82)  RR: 16 (11-30-20 @ 18:31) (16 - 21)  SpO2: 95% (11-30-20 @ 18:31) (95% - 100%)      Constitutional - NAD, Comfortable  HEENT - NCAT, EOMI  Neck - Supple, No limited ROM  Chest - good chest expansion, good respiratory effort, CTAB   Cardio - warm and well perfused, RRR  Abdomen -  Soft, NTND  Extremities - b/l LE edema  Neurologic Exam:                    Cognitive -             Orientation: Awake, Alert, AAO to self, place, date, year, situation            Thought: process, content appropriate     Speech - Fluent, Comprehensible, No dysarthria, No aphasia      Cranial Nerves - No facial asymmetry, Tongue midline, EOMI, Shoulder shrug intact      Sensory - Left LE numbness on plantar aspect, but intact elsewhere  Psychiatric - Mood stable, Affect WNL  Skin on admission: Sternal incision from sternotomy, no staples in place; appears c/d/i. Left LE incision from vein harvesting, staples in place mild erythema surround incision; appears c/d/i Vital Signs Last 24 Hrs  T(C): 36.5 (11-30-20 @ 18:31), Max: 36.9 (11-30-20 @ 15:23)  T(F): 97.7 (11-30-20 @ 18:31), Max: 98.4 (11-30-20 @ 15:23)  HR: 92 (11-30-20 @ 18:31) (87 - 99)  BP: 136/82 (11-30-20 @ 18:31) (108/63 - 136/82)  RR: 16 (11-30-20 @ 18:31) (16 - 21)  SpO2: 95% (11-30-20 @ 18:31) (95% - 100%)      Constitutional - NAD, Comfortable  HEENT - NCAT, EOMI  Neck - Supple, No limited ROM  Chest - good chest expansion, good respiratory effort, CTAB   Cardio - warm and well perfused, RRR  Abdomen -  Soft, NTND  Extremities - b/l LE edema  Neurologic Exam:                    Cognitive -             Orientation: Awake, Alert, AAO to self, place, date, year, situation            Thought: process, content appropriate     Speech - Fluent, Comprehensible, No dysarthria, No aphasia      Cranial Nerves - No facial asymmetry, Tongue midline, EOMI, Shoulder shrug intact      Sensory - Left LE numbness on plantar aspect, but intact elsewhere  Psychiatric - Mood stable, Affect WNL  Skin on admission: Sternal incision from sternotomy, no staples in place; appears c/d/i. Left LE incision from vein harvesting, staples in place mild erythema surround incision; appears c/d/i  Lines: Right IJ Permacath on the right chest wall. Midline in right medial arm Vital Signs Last 24 Hrs  T(C): 36.5 (11-30-20 @ 18:31), Max: 36.9 (11-30-20 @ 15:23)  T(F): 97.7 (11-30-20 @ 18:31), Max: 98.4 (11-30-20 @ 15:23)  HR: 92 (11-30-20 @ 18:31) (87 - 99)  BP: 136/82 (11-30-20 @ 18:31) (108/63 - 136/82)  RR: 16 (11-30-20 @ 18:31) (16 - 21)  SpO2: 95% (11-30-20 @ 18:31) (95% - 100%)      Constitutional - NAD, Comfortable  HEENT - NCAT, EOMI  Neck - Supple, No limited ROM  Chest - good chest expansion, good respiratory effort, CTAB   Cardio - warm and well perfused, RRR  Abdomen -  Soft, NTND  Extremities - b/l LE edema  Neurologic Exam:                    Cognitive -             Orientation: Awake, Alert, AAO to self, place, date, year, situation            Thought: process, content appropriate     Speech - Fluent, Comprehensible, No dysarthria, No aphasia      Cranial Nerves - No facial asymmetry, Tongue midline, EOMI, Shoulder shrug intact      Sensory - Left LE numbness on plantar aspect, but intact elsewhere  Psychiatric - Mood stable, Affect WNL  Skin on admission: Sternal incision from sternotomy, no staples in place; appears c/d/i. Left LE incision from vein harvesting, staples in place mild erythema surround incision; appears c/d/i. Right inner thigh incision, as well with staples in place; appears c/d/i. Stage 1 sacral lesion  Lines: Right IJ Permacath on the right chest wall. Midline in right medial arm

## 2020-11-30 NOTE — PROGRESS NOTE ADULT - PROBLEM SELECTOR PROBLEM 1
Anemia due to other cause
Other iron deficiency anemia
Mitral and aortic regurgitation

## 2020-11-30 NOTE — PROGRESS NOTE ADULT - PROBLEM/PLAN-1
DISPLAY PLAN FREE TEXT
Other, specify...

## 2020-11-30 NOTE — DISCHARGE NOTE NURSING/CASE MANAGEMENT/SOCIAL WORK - PATIENT PORTAL LINK FT
You can access the FollowMyHealth Patient Portal offered by [facility] by registering at the following website: [email or web address]. By joining [facility]’s FollowMyHealth portal, you will also be able to view your health information using other applications (apps) compatible with our system.

## 2020-11-30 NOTE — DISCHARGE NOTE PROVIDER - HOSPITAL COURSE
60 year old M with AD s/p stent in LAD, leaky aortic valve, uncontrolled bp, thoracic aortic dissection requiring emergent surgery, CVA with left sided sensory loss, colostomy for bowel ischemia s/p reversal, psoriasis on Humira injections presents from home with sob and le edema, found to have acute diastolic heart failure, moderate-severe MR, moderate TR, mod-severe AI, SANTOS on CKD requiring HD (now with RIJ permacath placed 11/20), and anemia (likely d/t chronic renal disease, GI w/u unremarkable). LHC revealed severe 2 vessel CAD and known residual Type B dissection. Patient underwent resternotomy, CABG x 2 (SVG to OM, RPDA), AVR, MVR on 11/13 with Dr. Butler. Intra-op acute blood loss anemia and coagulopathy noted. Post-operative course notable for cardiogenic shock (resolved), acute blood loss anemia (improved), acute hypoxemic respiratory failure (resolved), junctional rhythm with periods of ventricular standstill (now resolved, rhythm remains sinus tach 100s), and Supratherapeutic INR, now resolved, received 1 mg of Coumadin for INR of 2.93 on 11/29). Patient is now stable for discharge to Acute [facility] as per Dr. Butler.    60 year old M with AD s/p stent in LAD, leaky aortic valve, uncontrolled bp, thoracic aortic dissection requiring emergent surgery, CVA with left sided sensory loss, colostomy for bowel ischemia s/p reversal, psoriasis on Humira injections presents from home with sob and le edema, found to have acute diastolic heart failure, moderate-severe MR, moderate TR, mod-severe AI, SANTOS on CKD requiring HD (now with RIJ permacath placed 11/20), and anemia (likely d/t chronic renal disease, GI w/u unremarkable). LHC revealed severe 2 vessel CAD and known residual Type B dissection. Patient underwent resternotomy, CABG x 2 (SVG to OM, RPDA), AVR, MVR on 11/13 with Dr. Butler. Intra-op acute blood loss anemia and coagulopathy noted. Post-operative course notable for cardiogenic shock (resolved), acute blood loss anemia (improved), acute hypoxemic respiratory failure (resolved), junctional rhythm with periods of ventricular standstill (now resolved, rhythm remains sinus tach 100s), and Supratherapeutic INR, now resolved, received 1 mg of Coumadin for INR of 2.93 on 11/29). Patient is now stable for discharge to Acute Rehab as per Dr. Butler.

## 2020-11-30 NOTE — H&P ADULT - ATTENDING COMMENTS
Chart reviewed. Patient seen at bedside  I have reviewed the resident's note, and agree with findings and plan as documented and edited as needed.  Begin full rehab program  See daily progress note

## 2020-11-30 NOTE — PROGRESS NOTE ADULT - PROBLEM SELECTOR PLAN 3
GI following  Trend H/H  EGD and colonoscopy 11/10 f/u results   continue iron surcose IVPB
GI following  Trend H/H  EGD and colonoscopy 11/9.
GI following  Trend H/H  EGD and colonoscopy 11/9.
GI following  Trend H/H  EGD and colonoscopy 11/9.    continue iron surcose IVPB
Trend H/H.   Transfuse PRN.
Trend renal function  Needs gated CTA chest with IV contrast when renal function improves.  Nephrology following, will need to start on HD before cardiac surgery to optimize patient  HD today, then Monday as per renal
Trend renal function Cr. peaked 3.3 down to 2.66  continue HD as per renal  Renal ultrasound 11/2 without hydronephrosis.
for permacath 11/20  HD per nephrology  adjust meds per CrCl  Hold nephrotoxic meds  bladder scan daily  Trend BMP
GI following  Trend H/H  EGD and colonoscopy 11/9.
s/p permacath on 11/20  HD per nephrology  s/p HD session 11/22 with -1500cc fluid removal.  No plans currently for AVF as per Vascular Surgery  adjust meds per CrCl  Hold nephrotoxic meds  bladder scan daily  Trend BMP
GI following  Trend H/H  EGD and colonoscopy 11/9.
stable  trend daily CBC for now
s/p permacath on 11/20  HD per nephrology  No plans currently for AVF as per Vascular Surgery  adjust meds per CrCl  Hold nephrotoxic meds  bladder scan daily  Trend BMP
s/p permacath on 11/20  HD per nephrology, plan for next Rx on 11/21  adjust meds per CrCl  Hold nephrotoxic meds  bladder scan daily  Trend BMP
stable  trend daily CBC for now
stable  trend daily CBC for now
s/p permacath on 11/20  HD per nephrology  s/p HD session 11/22 with -1500cc fluid removal, next session planned for today  No plans currently for AVF as per Vascular Surgery  adjust meds per CrCl  Hold nephrotoxic meds  bladder scan daily  Trend BMP

## 2020-11-30 NOTE — PROGRESS NOTE ADULT - NUTRITIONAL ASSESSMENT
This patient has been assessed with a concern for Malnutrition and has been determined to have a diagnosis/diagnoses of Moderate protein-calorie malnutrition.    This patient is being managed with:   Diet NPO after Midnight-     NPO Start Date: 19-Nov-2020   NPO Start Time: 23:59  Entered: Nov 19 2020  9:14AM    Diet Regular-  Consistent Carbohydrate {No Snacks} (CSTCHO)  DASH/TLC {Sodium & Cholesterol Restricted} (DASH)  Entered: Nov 17 2020  8:55AM    
This patient has been assessed with a concern for Malnutrition and has been determined to have a diagnosis/diagnoses of Moderate protein-calorie malnutrition.    This patient is being managed with:   Diet Regular-  Consistent Carbohydrate {Evening Snack} (CSTCHOSN)  DASH/TLC {Sodium & Cholesterol Restricted} (DASH)  Entered: Nov 25 2020  8:45AM    
This patient has been assessed with a concern for Malnutrition and has been determined to have a diagnosis/diagnoses of Moderate protein-calorie malnutrition.    This patient is being managed with:   Diet Regular-  Consistent Carbohydrate {No Snacks} (CSTCHO)  DASH/TLC {Sodium & Cholesterol Restricted} (DASH)  For patients receiving Renal Replacement - No Protein Restr No Conc K No Conc Phos Low  Sodium (RENAL)  Supplement Feeding Modality:  Oral  Ensure Enlive Cans or Servings Per Day:  3       Frequency:  Three Times a day  Entered: Nov 20 2020  1:06AM    Diet NPO after Midnight-     NPO Start Date: 19-Nov-2020   NPO Start Time: 23:59  Entered: Nov 19 2020  9:14AM    
This patient has been assessed with a concern for Malnutrition and has been determined to have a diagnosis/diagnoses of Moderate protein-calorie malnutrition.      
This patient has been assessed with a concern for Malnutrition and has been determined to have a diagnosis/diagnoses of Moderate protein-calorie malnutrition.    This patient is being managed with:   Diet NPO after Midnight-     NPO Start Date: 19-Nov-2020   NPO Start Time: 23:59  Entered: Nov 19 2020  9:14AM    Diet Regular-  Consistent Carbohydrate {No Snacks} (CSTCHO)  DASH/TLC {Sodium & Cholesterol Restricted} (DASH)  Entered: Nov 17 2020  8:55AM    
This patient has been assessed with a concern for Malnutrition and has been determined to have a diagnosis/diagnoses of Moderate protein-calorie malnutrition.    This patient is being managed with:   Diet Regular-  Consistent Carbohydrate {Evening Snack} (CSTCHOSN)  DASH/TLC {Sodium & Cholesterol Restricted} (DASH)  Entered: Nov 25 2020  8:45AM    
This patient has been assessed with a concern for Malnutrition and has been determined to have a diagnosis/diagnoses of Moderate protein-calorie malnutrition.    This patient is being managed with:   Diet Regular-  Consistent Carbohydrate {Evening Snack} (CSTCHOSN)  DASH/TLC {Sodium & Cholesterol Restricted} (DASH)  Entered: Nov 25 2020  8:45AM    
This patient has been assessed with a concern for Malnutrition and has been determined to have a diagnosis/diagnoses of Moderate protein-calorie malnutrition.    This patient is being managed with:   Diet Regular-  Consistent Carbohydrate {No Snacks} (CSTCHO)  DASH/TLC {Sodium & Cholesterol Restricted} (DASH)  For patients receiving Renal Replacement - No Protein Restr No Conc K No Conc Phos Low  Sodium (RENAL)  Supplement Feeding Modality:  Oral  Ensure Enlive Cans or Servings Per Day:  3       Frequency:  Three Times a day  Entered: Nov 20 2020  1:06AM    Diet NPO after Midnight-     NPO Start Date: 19-Nov-2020   NPO Start Time: 23:59  Entered: Nov 19 2020  9:14AM    
This patient has been assessed with a concern for Malnutrition and has been determined to have a diagnosis/diagnoses of Moderate protein-calorie malnutrition.    This patient is being managed with:   Diet Regular-  DASH/TLC {Sodium & Cholesterol Restricted} (DASH)  1500mL Fluid Restriction ([id number])  For patients receiving Renal Replacement - No Protein Restr No Conc K No Conc Phos Low  Sodium (RENAL)  Supplement Feeding Modality:  Oral  Nepro Cans or Servings Per Day:  2       Frequency:  Daily  Entered: Nov 30 2020  9:51AM    
This patient has been assessed with a concern for Malnutrition and has been determined to have a diagnosis/diagnoses of Moderate protein-calorie malnutrition.    This patient is being managed with:   Diet Regular-  Consistent Carbohydrate {No Snacks} (CSTCHO)  DASH/TLC {Sodium & Cholesterol Restricted} (DASH)  For patients receiving Renal Replacement - No Protein Restr No Conc K No Conc Phos Low  Sodium (RENAL)  Supplement Feeding Modality:  Oral  Ensure Enlive Cans or Servings Per Day:  3       Frequency:  Three Times a day  Entered: Nov 20 2020  1:06AM    Diet NPO after Midnight-     NPO Start Date: 19-Nov-2020   NPO Start Time: 23:59  Entered: Nov 19 2020  9:14AM    
This patient has been assessed with a concern for Malnutrition and has been determined to have a diagnosis/diagnoses of Moderate protein-calorie malnutrition.    This patient is being managed with:   Diet Regular-  Consistent Carbohydrate {No Snacks} (CSTCHO)  DASH/TLC {Sodium & Cholesterol Restricted} (DASH)  For patients receiving Renal Replacement - No Protein Restr No Conc K No Conc Phos Low  Sodium (RENAL)  Supplement Feeding Modality:  Oral  Ensure Enlive Cans or Servings Per Day:  3       Frequency:  Three Times a day  Entered: Nov 20 2020  1:06AM    Diet NPO after Midnight-     NPO Start Date: 19-Nov-2020   NPO Start Time: 23:59  Entered: Nov 19 2020  9:14AM    
This patient has been assessed with a concern for Malnutrition and has been determined to have a diagnosis/diagnoses of Moderate protein-calorie malnutrition.    This patient is being managed with:   Diet Regular-  Consistent Carbohydrate {Evening Snack} (CSTCHOSN)  DASH/TLC {Sodium & Cholesterol Restricted} (DASH)  Entered: Nov 25 2020  8:45AM    
This patient has been assessed with a concern for Malnutrition and has been determined to have a diagnosis/diagnoses of Moderate protein-calorie malnutrition.    This patient is being managed with:   Diet Regular-  Consistent Carbohydrate {Evening Snack} (CSTCHOSN)  DASH/TLC {Sodium & Cholesterol Restricted} (DASH)  Entered: Nov 25 2020  8:45AM    
This patient has been assessed with a concern for Malnutrition and has been determined to have a diagnosis/diagnoses of Moderate protein-calorie malnutrition.    This patient is being managed with:   Diet Regular-  DASH/TLC {Sodium & Cholesterol Restricted} (DASH)  1500mL Fluid Restriction ([id number])  For patients receiving Renal Replacement - No Protein Restr No Conc K No Conc Phos Low  Sodium (RENAL)  Supplement Feeding Modality:  Oral  Nepro Cans or Servings Per Day:  2       Frequency:  Daily  Entered: Nov 30 2020  9:51AM    
This patient has been assessed with a concern for Malnutrition and has been determined to have a diagnosis/diagnoses of Moderate protein-calorie malnutrition.    This patient is being managed with:   Diet Regular-  Consistent Carbohydrate {No Snacks} (CSTCHO)  DASH/TLC {Sodium & Cholesterol Restricted} (DASH)  For patients receiving Renal Replacement - No Protein Restr No Conc K No Conc Phos Low  Sodium (RENAL)  Supplement Feeding Modality:  Oral  Ensure Enlive Cans or Servings Per Day:  3       Frequency:  Three Times a day  Entered: Nov 20 2020  1:06AM    Diet NPO after Midnight-     NPO Start Date: 19-Nov-2020   NPO Start Time: 23:59  Entered: Nov 19 2020  9:14AM    
This patient has been assessed with a concern for Malnutrition and has been determined to have a diagnosis/diagnoses of Moderate protein-calorie malnutrition.    This patient is being managed with:   Diet NPO after Midnight-     NPO Start Date: 24-Nov-2020   NPO Start Time: 23:59  Entered: Nov 24 2020  9:46PM    
This patient has been assessed with a concern for Malnutrition and has been determined to have a diagnosis/diagnoses of Moderate protein-calorie malnutrition.    This patient is being managed with:   Diet Regular-  Consistent Carbohydrate {No Snacks} (CSTCHO)  DASH/TLC {Sodium & Cholesterol Restricted} (DASH)  For patients receiving Renal Replacement - No Protein Restr No Conc K No Conc Phos Low  Sodium (RENAL)  Supplement Feeding Modality:  Oral  Ensure Enlive Cans or Servings Per Day:  3       Frequency:  Three Times a day  Entered: Nov 20 2020  1:06AM    
This patient has been assessed with a concern for Malnutrition and has been determined to have a diagnosis/diagnoses of Moderate protein-calorie malnutrition.    This patient is being managed with:   Diet Regular-  Consistent Carbohydrate {Evening Snack} (CSTCHOSN)  DASH/TLC {Sodium & Cholesterol Restricted} (DASH)  Entered: Nov 25 2020  8:45AM    
This patient has been assessed with a concern for Malnutrition and has been determined to have a diagnosis/diagnoses of Moderate protein-calorie malnutrition.    This patient is being managed with:   Diet Regular-  Consistent Carbohydrate {Evening Snack} (CSTCHOSN)  DASH/TLC {Sodium & Cholesterol Restricted} (DASH)  Entered: Nov 25 2020  8:45AM

## 2020-11-30 NOTE — DISCHARGE NOTE PROVIDER - NSDCCPCAREPLAN_GEN_ALL_CORE_FT
PRINCIPAL DISCHARGE DIAGNOSIS  Diagnosis: Coronary artery disease  Assessment and Plan of Treatment: 1. Take ALL of your medications as ordered. Fill your prescriptions the day you are discharged and take according to the schedule you were given. Continue to take a stool softener if you are taking narcotic pain medications. AVOID medications such as ibuprofen or naproxen if you have had bypass surgery. If you have any questions or are unable to fill the prescriptions, please call the office right away at [phone].  2. Shower daily. Clean all incisions daily while showering with warm water and mild soap, pat dry with a clean towel and do not cover with any dressings unless instructed to. No bathing, swimming in a pool or the ocean until instructed by MD.  DO NOT use creams or lotions on the wound.  3. We advise that you do not drive until instructed by MD.   4. You may not return to work until instructed by MD.   5. Please eat a low fat, low cholesterol, low salt diet. (No added/extra salt)  6. Weigh yourself every day in the morning and record it in the weight log in your red folder. Notify the office of any weight gain more than 2-3 pounds in 24 hours.  7. Continue breathing exercises several times a day. Continue to use your heart pillow.  8. No heavy lifting nothing greater than 5 pounds until cleared by MD.   9. Call / Notify MD any fever greater than 101.0, any drainage from incisions or if they become red, hot or very tender to the touch.  10. Increase activity as tolerated. Walk indoors and/or outdoors at least 3 times a day.        SECONDARY DISCHARGE DIAGNOSES  Diagnosis: Mitral and aortic regurgitation  Assessment and Plan of Treatment: - You will receive a wallet card about your new valve in the mail.  Please carry it with you to present to anyone who may ask if you have any medical implants.  - Be sure to inform your doctors including your dentist about your valve since you will need to take antibiotics to reduce the risk of infection before certain medical and dental procedures.      Diagnosis: CVA (cerebral vascular accident)  Assessment and Plan of Treatment: Continue to take all medicines as ordered  Follow up with your neurologist and [facility] doctor    Diagnosis: SANTOS (acute kidney injury)  Assessment and Plan of Treatment: Follow up with  Nephrologist DR. Morales as outpatient    Diagnosis: Essential hypertension  Assessment and Plan of Treatment: Continue to take all medicines as ordered  Choose lean meats and poultry without skin and prepare them without added saturated/trans fat. Choose fish at least twice a week, especially those high in omega-3 (salmon or trout). Select fat-free or low-fat dairy products. To lower cholesterol, reduce saturated fat to no more than 5 to 6 percent of total calories. For someone eating 2,000 calories a day, that’s about 13 grams of saturated fat.  Cut back on beverages and foods with added sugars.  Choose and prepare foods with little or no salt. To lower blood pressure, reducing daily intake of sodium to 1,500 mg is desirable because it can lower blood pressure.  If you drink alcohol, drink sparingly and NOT if you are taking narcotics for pain. Follow the American Heart Association recommendations when you eat out, and keep an eye on your portion sizes.      Diagnosis: ESRD (end stage renal disease)  Assessment and Plan of Treatment: Same as SANTOS  Follow up the nephrologist   Continue HD as per protocol    Diagnosis: Acute congestive heart failure, unspecified heart failure type  Assessment and Plan of Treatment: You are being discharged with a diagnosis of heart failure. To prevent exacerbation of congestive heart failure (CHF) it is important to follow a heart-healthy, LOW SODIUM diet. You should read all food labels and keep your sodium intake less than 1500 mg per day. Examples of high sodium foods include fast food or processed food (ie frozen TV dinners), chinese food, soup and regular cold cuts. You should also restrict your fluid intake to less than 1-1.5 liters per day. Please weigh yourself every day at the same time in the morning and document your weight in a weight log. Please call your doctor right away if you gain over 2-3 pounds in one day, or you notice an increase in leg swelling, abdominal swelling or shortness of breath. Making sure you take all of your medications as prescribed and maintaining a normal blood pressure are also important for preventing a CHF exacerbation.      Diagnosis: Acute diastolic heart failure  Assessment and Plan of Treatment: Acute diastolic heart failure    Diagnosis: Coronary artery disease  Assessment and Plan of Treatment:     Diagnosis: ESRD (end stage renal disease)  Assessment and Plan of Treatment: ESRD (end stage renal disease)

## 2020-11-30 NOTE — PROGRESS NOTE ADULT - SUBJECTIVE AND OBJECTIVE BOX
Patient very tired and back is hurting.  Provided emotional support as he is depressed.  Patient is making progress.   He is looking forward to improving and going to rehab.     REVIEW OF SYSTEMS  Constitutional - No fever,  +fatigue  HEENT - No vertigo, No neck pain  Neurological - No headaches, No memory loss, +loss of strength   Musculoskeletal - +joint pain, No joint swelling, +muscle pain  Psychiatric - +depression, No anxiety    FUNCTIONAL PROGRESS  11/27  Sit-Stand Transfer Training  Sit-to-Stand Transfer Training Rehab Potential: good, to achieve stated therapy goals  Transfer Training Sit-to-Stand Transfer: moderate assist (50% patient effort);  1 person assist;  verbal cues  Transfer Training Stand-to-Sit Transfer: contact guard;  1 person assist;  verbal cues  Sit-to-Stand Transfer Training Transfer Safety Analysis: impaired balance;  decreased strength    Gait Training  Gait Training Rehab Potential: good, to achieve stated therapy goals  Gait Training: minimum assist (75% patient effort);  1 person assist;  verbal cues;  rolling walker;  20 feet  Gait Analysis: 2-point gait   decreased step length;  decreased hip/knee flexion;  impaired balance;  decreased strength;  20 feet;  rolling walker      VITALS  T(C): 36.5 (11-30-20 @ 09:30), Max: 36.6 (11-29-20 @ 21:58)  HR: 93 (11-30-20 @ 09:30) (63 - 97)  BP: 108/63 (11-30-20 @ 09:30) (108/63 - 123/77)  RR: 18 (11-30-20 @ 09:30) (17 - 21)  SpO2: 97% (11-30-20 @ 09:30) (97% - 100%)  Wt(kg): --    MEDICATIONS   acetaminophen   Tablet .. 650 milliGRAM(s) every 6 hours PRN  aMIOdarone    Tablet     aMIOdarone    Tablet 200 milliGRAM(s) daily  ascorbic acid 500 milliGRAM(s) daily  aspirin enteric coated 81 milliGRAM(s) daily  atorvastatin 40 milliGRAM(s) at bedtime  benzocaine 15 mG/menthol 3.6 mG (Sugar-Free) Lozenge 1 Lozenge three times a day PRN  chlorhexidine 2% Cloths 1 Application(s) daily  epoetin yolanda-epbx (RETACRIT) Injectable 08685 Unit(s) <User Schedule>  folic acid 1 milliGRAM(s) daily  gabapentin 100 milliGRAM(s) at bedtime  iron sucrose IVPB 100 milliGRAM(s) every 7 days  levETIRAcetam 750 milliGRAM(s) two times a day  lidocaine   Patch 1 Patch every 24 hours PRN  melatonin 5 milliGRAM(s) at bedtime  methylphenidate 5 milliGRAM(s) <User Schedule>  metoprolol tartrate 50 milliGRAM(s) every 8 hours  multivitamin 1 Tablet(s) daily  pantoprazole    Tablet 40 milliGRAM(s) two times a day  QUEtiapine 25 milliGRAM(s) at bedtime  sodium chloride 0.9% lock flush 3 milliLiter(s) every 8 hours      RECENT LABS/IMAGING                          8.7    14.29 )-----------( 193      ( 30 Nov 2020 06:54 )             28.2     11-30    136  |  98  |  76.0<H>  ----------------------------<  96  4.4   |  23.0  |  5.17<H>    Ca    8.0<L>      30 Nov 2020 06:54  Phos  2.6     11-30  Mg     2.0     11-30      PT/INR - ( 30 Nov 2020 06:54 )   PT: 28.0 sec;   INR: 2.52 ratio                     CTA NECK/BRAIN 11/12 - 70% stenosis in the right carotid bulb. Aortic arch dissection extending into the great vessels, as above. Motion artifacts limits evaluation of the left common carotid artery.    CXR 11/16 - Retrocardiac opacity, likely atelectasis.    CXR 11/19 - Status post open heart surgery. No evidence of active chest pathology. Catheters and/or tubes in place    ABD XR 11/23 - Diffuse air-filled bowel ileus. Follow-up upright or lateral decubitus KUB recommended to evaluate for static versus differential air-fluid levels    TTE 11/23 - Summary:   1. Left ventricular ejection fraction, by visual estimation, is 40%%.   2. Technically adequate study.   3. Moderately decreased global left ventricular systolic function.   4. Multiple left ventricular regional wall motion abnormalities exist. See wall motion findings.   5. The left ventricular diastolic function could not be assessed in this study.   6. Moderately reduced RV systolic function.   7. Trivial pericardial effusion.   8. A bioprosthetic valve is present in the mitral position.   9. Trace mitral valve regurgitation.  10. Moderate tricuspid regurgitation.  11. Bioprosthesis in the aortic position.  12. Estimated pulmonary artery systolic pressure is 40.9 mmHg assuming a right atrial pressure of 8 mmHg, which is consistent with mild pulmonary hypertension.  13. Endocardial visualization was enhanced with intravenous echo contrast.  14. Mitral valve mean gradient is 2.2 mmHg consistent with mild mitral stenosis.    CXR 11/26 - Cardiomegaly. No infiltrates.    CXR 11/29 - Prior sternotomy and valve replacement with surgical skin staples. Mild pulmonary vascular congestion with trace bilateral pleural effusions  ----------------------------------------------------------------------------------------  PHYSICAL EXAM  Constitutional - NAD, Comfortable  Extremities - Diffuse mild edema   Neurologic Exam -                    Cognitive - Awake, Alert, AAO to self, PART of situation     Communication - Fluent, +dysarthria     Motor -  No apparent focal deficits      Sensory - Decreased to the left      Coordination - FTN impaired  Psychiatric - Mood depressed   ----------------------------------------------------------------------------------------  ASSESSMENT/PLAN  60yMale with functional deficits after debility related to severe MR and AR s/p AVR/MVR   CAD s/p CABGx2, Old CVA - Coumadin, ASA, Lipitor  HTN - Lopressor  AFIB - Amiodarone   Ileus - Reglan  Pain - Tylenol, Lidoderm, Neurontin  Sleep - Melatonin  Mood - Seroquel  Seizures - Keppra  DVT PPX - SCDs  Rehab - Continue to recommend ACUTE inpatient rehabilitation for the functional deficits consisting of 3 hours of therapy/day & 24 hour RN/daily PMR physician for comorbid medical management. Will continue to follow for ongoing rehab needs and recommendations. Patient will be able to tolerate 3 hours a day.    Continue bedside therapy as well as OOB throughout the day with mobilization throughout the day with staff to maintain cardiopulmonary function and prevention of secondary complications related to debility.     Discussed with rehab clinical team.

## 2020-11-30 NOTE — PROGRESS NOTE ADULT - SUBJECTIVE AND OBJECTIVE BOX
[facility], [facility]                                          [address], [address], [place] [address]                                               PHONE: [phone]    FAX: [phone] [address], [address], [place] [address]                                       PHONE: [phone]    FAX: [phone]  *******************************************************************************    Overnight events/Subjective Assessment:  OOB this AM.  Feeling a little better.  Anticipating discharge.  No CP/SOB/Palp    INTERPRETATION OF TELEMETRY (personally reviewed): atrial tachycardia, ventricular rate 80-90s    penicillin (Unknown)    MEDICATIONS  (STANDING):  aMIOdarone    Tablet   Oral   aMIOdarone    Tablet 200 milliGRAM(s) Oral daily  ascorbic acid 500 milliGRAM(s) Oral daily  aspirin enteric coated 81 milliGRAM(s) Oral daily  atorvastatin 40 milliGRAM(s) Oral at bedtime  chlorhexidine 2% Cloths 1 Application(s) Topical daily  epoetin yolanda-epbx (RETACRIT) Injectable 65154 Unit(s) IV Push <User Schedule>  folic acid 1 milliGRAM(s) Oral daily  gabapentin 100 milliGRAM(s) Oral at bedtime  levETIRAcetam 750 milliGRAM(s) Oral two times a day  melatonin 5 milliGRAM(s) Oral at bedtime  methylphenidate 5 milliGRAM(s) Oral <User Schedule>  metoprolol tartrate 50 milliGRAM(s) Oral every 6 hours  multivitamin 1 Tablet(s) Oral daily  pantoprazole    Tablet 40 milliGRAM(s) Oral two times a day  QUEtiapine 25 milliGRAM(s) Oral at bedtime  sodium chloride 0.9% lock flush 3 milliLiter(s) IV Push every 8 hours    MEDICATIONS  (PRN):  acetaminophen   Tablet .. 650 milliGRAM(s) Oral every 6 hours PRN Mild Pain (1 - 3)  benzocaine 15 mG/menthol 3.6 mG (Sugar-Free) Lozenge 1 Lozenge Oral three times a day PRN Sore Throat  lidocaine   Patch 1 Patch Transdermal every 24 hours PRN as needed      Vital Signs Last 24 Hrs  T(C): 36.6 (30 Nov 2020 05:37), Max: 36.6 (29 Nov 2020 21:58)  T(F): 97.9 (30 Nov 2020 05:37), Max: 97.9 (30 Nov 2020 05:37)  HR: 88 (30 Nov 2020 05:37) (63 - 97)  BP: 118/54 (30 Nov 2020 05:37) (109/68 - 123/77)  BP(mean): --  RR: 19 (30 Nov 2020 05:37) (17 - 21)  SpO2: 100% (30 Nov 2020 05:37) (98% - 100%)    I&O's Detail    28 Nov 2020 07:01  -  29 Nov 2020 07:00  --------------------------------------------------------  IN:    Oral Fluid: 1040 mL  Total IN: 1040 mL    OUT:    Voided (mL): 375 mL  Total OUT: 375 mL    Total NET: 665 mL      29 Nov 2020 07:01  -  30 Nov 2020 06:47  --------------------------------------------------------  IN:    Oral Fluid: 840 mL  Total IN: 840 mL    OUT:    Voided (mL): 100 mL  Total OUT: 100 mL    Total NET: 740 mL        I&O's Summary    28 Nov 2020 07:01  -  29 Nov 2020 07:00  --------------------------------------------------------  IN: 1040 mL / OUT: 375 mL / NET: 665 mL    29 Nov 2020 07:01  -  30 Nov 2020 06:47  --------------------------------------------------------  IN: 840 mL / OUT: 100 mL / NET: 740 mL            PHYSICAL EXAM:  General: Appears well developed, well nourished, no acute distress  HEENT: Head: normocephalic, atraumatic  Eyes: Pupils equal and reactive  Neck: Supple, no carotid bruit, no JVD, no HJR  CARDIOVASCULAR: Tachycardic, regular, Normal S1 and S2, no murmur, rub, or gallop  LUNGS: Diminished at bases, otherwise clear to auscultation bilaterally, no rales, rhonchi or wheeze  CHEST: Clean sternotomy dressing  ABDOMEN: Soft, nontender, non-distended, positive bowel sounds, no mass or bruit  EXTREMITIES: 1-2+ bilat LE edema, distal pulses WNL, dressings c/d/i  SKIN: Warm and dry with normal turgor  NEURO: Alert & oriented x 3, grossly intact  PSYCH: normal mood and affect, ? depression    LABS:                        9.2    11.70 )-----------( 185      ( 29 Nov 2020 06:55 )             29.7     11-29    136  |  98  |  62.0<H>  ----------------------------<  86  4.4   |  26.0  |  4.87<H>    Ca    7.9<L>      29 Nov 2020 06:55  Phos  2.5     11-29  Mg     1.7     11-29          PT/INR - ( 29 Nov 2020 06:55 )   PT: 32.3 sec;   INR: 2.93 ratio           serum  Lipids:     Thyroid Stimulating Hormone, Serum: 1.95 uIU/mL (11-03 @ 08:18)        ASSESSMENT AND PLAN:  In summary, JONATHAN GIBSON is a 60M s/p re-op CABGx2 (SVG->OM/RPDA), bioAVR & bioMVR 11/13/20, post-op junctional rhythm resolved, now AT/AFlutter 100s, LBBB, ARF/CRI now on HD (s/p permacath 11/20/20), anemia s/p PRBCs, volume overload improving, h/o CAD s/p stent LAD, normal LV fxn, type A dissection repair 2010, known chronic type B dissection, chronic diastolic CHF, CVA w/ L-sided sensory loss, DM, HTN, HL, psoriasis on Humira, likely COVID in 9/20 (+ Ab), prior cocaine/opioid/benzo/EtoH abuse    - Patient improving clinically post-operatively, s/p reop AVR 25mm CE 2700TFX, MVR 31mm Magna Ease, CABGx2 (DGV-OM, RPDA) with Dr. Butler 11/13/20.  - Cardiac cath 11/10/20: severe 2 vessel CAD.  Chronically occluded proximal RCA with collaterals from the LCA.  EF 50%.  Elevated LVEDP.  - Echocardiogram 11/4/20 EF 50-55%, grade II diastolic dysfunction, mildly reduced RV systolic function, severe LAE, mild KOLTON, mod-severe MR and AI, mod TR, dilated aortic root @ 4.2 cm  - Junctional rhythm post-operatively resolved, LBBB present.  No indication for a PPM at this time.  Patient was evaluated by Dr. Raygoza (EP).  S/P Amio load, now 200mg daily.  Rhythm now AT with HR 90-100s, BP stable.  Continue Lopressor 50mg q6h.  Can convert to long-acting ahead of discharge.  Monitor vitals closely and titrate meds PRN.    - ASA 81 daily in place  - Lipitor 40 daily in place  - ARF/CRI now on HD, s/p permacath.  Nephrology follow-up.  - Patient with AFlutter with increased [id number] risk score currently maintained on oral AC with Coumadin.  Dosing per primary team.  Goal INR 2-3.  - DM.  Glycemic control per primary team.  - Valvular heart disease. s/p reoperative AVR and now MVR. SBE prophylaxis for procedures that entail bacteremia.  - Seizure disorder. Keppra in place.  - DC planning as per CTS  - Outpatient follow up at the [facility] after discharge    Celso Stockton MD [facility], [facility]                                          [address], [address], [place] [address]                                               PHONE: [phone]    FAX: [phone] [address], [address], [place] [address]                                       PHONE: [phone]    FAX: [phone]  *******************************************************************************    Overnight events/Subjective Assessment:  OOB this AM.  Feeling a little better.  Anticipating discharge.  No CP/SOB/Palp    INTERPRETATION OF TELEMETRY (personally reviewed): atrial tachycardia, ventricular rate 80-90s    penicillin (Unknown)    MEDICATIONS  (STANDING):  aMIOdarone    Tablet   Oral   aMIOdarone    Tablet 200 milliGRAM(s) Oral daily  ascorbic acid 500 milliGRAM(s) Oral daily  aspirin enteric coated 81 milliGRAM(s) Oral daily  atorvastatin 40 milliGRAM(s) Oral at bedtime  chlorhexidine 2% Cloths 1 Application(s) Topical daily  epoetin yolanda-epbx (RETACRIT) Injectable 88105 Unit(s) IV Push <User Schedule>  folic acid 1 milliGRAM(s) Oral daily  gabapentin 100 milliGRAM(s) Oral at bedtime  levETIRAcetam 750 milliGRAM(s) Oral two times a day  melatonin 5 milliGRAM(s) Oral at bedtime  methylphenidate 5 milliGRAM(s) Oral <User Schedule>  metoprolol tartrate 50 milliGRAM(s) Oral every 6 hours  multivitamin 1 Tablet(s) Oral daily  pantoprazole    Tablet 40 milliGRAM(s) Oral two times a day  QUEtiapine 25 milliGRAM(s) Oral at bedtime  sodium chloride 0.9% lock flush 3 milliLiter(s) IV Push every 8 hours    MEDICATIONS  (PRN):  acetaminophen   Tablet .. 650 milliGRAM(s) Oral every 6 hours PRN Mild Pain (1 - 3)  benzocaine 15 mG/menthol 3.6 mG (Sugar-Free) Lozenge 1 Lozenge Oral three times a day PRN Sore Throat  lidocaine   Patch 1 Patch Transdermal every 24 hours PRN as needed      Vital Signs Last 24 Hrs  T(C): 36.6 (30 Nov 2020 05:37), Max: 36.6 (29 Nov 2020 21:58)  T(F): 97.9 (30 Nov 2020 05:37), Max: 97.9 (30 Nov 2020 05:37)  HR: 88 (30 Nov 2020 05:37) (63 - 97)  BP: 118/54 (30 Nov 2020 05:37) (109/68 - 123/77)  BP(mean): --  RR: 19 (30 Nov 2020 05:37) (17 - 21)  SpO2: 100% (30 Nov 2020 05:37) (98% - 100%)    I&O's Detail    28 Nov 2020 07:01  -  29 Nov 2020 07:00  --------------------------------------------------------  IN:    Oral Fluid: 1040 mL  Total IN: 1040 mL    OUT:    Voided (mL): 375 mL  Total OUT: 375 mL    Total NET: 665 mL      29 Nov 2020 07:01  -  30 Nov 2020 06:47  --------------------------------------------------------  IN:    Oral Fluid: 840 mL  Total IN: 840 mL    OUT:    Voided (mL): 100 mL  Total OUT: 100 mL    Total NET: 740 mL        I&O's Summary    28 Nov 2020 07:01  -  29 Nov 2020 07:00  --------------------------------------------------------  IN: 1040 mL / OUT: 375 mL / NET: 665 mL    29 Nov 2020 07:01  -  30 Nov 2020 06:47  --------------------------------------------------------  IN: 840 mL / OUT: 100 mL / NET: 740 mL            PHYSICAL EXAM:  General: Appears well developed, well nourished, no acute distress  HEENT: Head: normocephalic, atraumatic  Eyes: Pupils equal and reactive  Neck: Supple, no carotid bruit, no JVD, no HJR  CARDIOVASCULAR: Tachycardic, regular, Normal S1 and S2, no murmur, rub, or gallop  LUNGS: Diminished at bases, otherwise clear to auscultation bilaterally, no rales, rhonchi or wheeze  CHEST: Clean sternotomy dressing  ABDOMEN: Soft, nontender, non-distended, positive bowel sounds, no mass or bruit  EXTREMITIES: 1-2+ bilat LE edema, distal pulses WNL, dressings c/d/i  SKIN: Warm and dry with normal turgor  NEURO: Alert & oriented x 3, grossly intact  PSYCH: normal mood and affect, ? depression    LABS:                        9.2    11.70 )-----------( 185      ( 29 Nov 2020 06:55 )             29.7     11-29    136  |  98  |  62.0<H>  ----------------------------<  86  4.4   |  26.0  |  4.87<H>    Ca    7.9<L>      29 Nov 2020 06:55  Phos  2.5     11-29  Mg     1.7     11-29          PT/INR - ( 29 Nov 2020 06:55 )   PT: 32.3 sec;   INR: 2.93 ratio           serum  Lipids:     Thyroid Stimulating Hormone, Serum: 1.95 uIU/mL (11-03 @ 08:18)        ASSESSMENT AND PLAN:  In summary, JONATHAN GIBSON is a 60M s/p re-op CABGx2 (SVG->OM/RPDA), bioAVR & bioMVR 11/13/20, post-op junctional rhythm resolved, now AT/AFlutter 100s, LBBB, ARF/CRI now on HD (s/p permacath 11/20/20), anemia s/p PRBCs, volume overload improving, h/o CAD s/p stent LAD, normal LV fxn, type A dissection repair 2010, known chronic type B dissection, chronic diastolic CHF, CVA w/ L-sided sensory loss, DM, HTN, HL, psoriasis on Humira, likely COVID in 9/20 (+ Ab), prior cocaine/opioid/benzo/EtoH abuse    - Patient improving clinically post-operatively, s/p reop AVR 25mm CE 2700TFX, MVR 31mm Magna Ease, CABGx2 (DGV-OM, RPDA) with Dr. Butler 11/13/20.  - Cardiac cath 11/10/20: severe 2 vessel CAD.  Chronically occluded proximal RCA with collaterals from the LCA.  EF 50%.  Elevated LVEDP.  - Echocardiogram 11/4/20 EF 50-55%, grade II diastolic dysfunction, mildly reduced RV systolic function, severe LAE, mild KOLTON, mod-severe MR and AI, mod TR, dilated aortic root @ 4.2 cm  - Junctional rhythm post-operatively resolved, LBBB present.  No indication for a PPM at this time.  Patient was evaluated by Dr. Raygoza (EP).  S/P Amio load, now 200mg daily.  Rhythm now AT with HR 90-100s, BP stable.  Continue Lopressor 50mg q6h.  Can convert to long-acting ahead of discharge.  Monitor vitals closely and titrate meds PRN.    - ASA 81 daily in place  - Lipitor 40 daily in place  - ARF/CRI now on HD, s/p permacath.  Nephrology follow-up.  - Patient with AFlutter with increased [id number] risk score currently maintained on oral AC with Coumadin.  Dosing per primary team.  Goal INR 2-3.  - DM.  Glycemic control per primary team.  - Valvular heart disease. s/p reoperative AVR and now MVR. SBE prophylaxis for procedures that entail bacteremia.  - Seizure disorder. Keppra in place.  - DC planning as per CTS  - Outpatient follow up at the [facility] after discharge    Celso Stockton MD

## 2020-11-30 NOTE — PROGRESS NOTE ADULT - ASSESSMENT
60 year old male with PMH 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     Ultimately, patient arrived with acute diastolic heart failure, moderate-severe MR, moderate TR, mod-severe AI, SANTOS on CKD requiring HD (now with RIJ permacath placed 11/20), and anemia (likely d/t chronic renal disease, GI w/u unremarkable). LHC revealed severe 2 vessel CAD and known residual Type B dissection.     Patient underwent resternotomy, CABG x 2 (SVG to OM, RPDA), AVR, MVR on 11/13 with Dr. Butler. Intra-op acute blood loss anemia and coagulopathy noted.    Post-operative course notable for cardiogenic shock (resolved), acute blood loss anemia (improved), acute hypoxemic respiratory failure (resolved), junctional rhythm with periods of ventricular standstill (now resolved, rhythm remains sinus tach/Aflutter 100s), and Supratherapeutic INR.    11/30: Dialysis today, if patient tolerate dialysis well plan for d/c to rehab

## 2020-11-30 NOTE — H&P ADULT - NSHPSOCIALHISTORY_GEN_ALL_CORE
SOCIAL HISTORY  Smoking - denied  EtOH - social  Drugs - prior cocaine use  Lives in private home with 5 VESTA, no steps once inside      FUNCTIONAL HISTORY  Previous Functional Status:  Independent in ambulation, ADL's, transfers prior to hospitalization    Current Functional Status:  Transfers: mod assist  Gait / ambulation: ambulates 20' with RW and min assist  ADL's: min assist with bathing

## 2020-11-30 NOTE — H&P ADULT - ASSESSMENT
JONATHAN GIBSON is a 61yo M with PMHx of CAD s/p PCI to LAD, aortic regurgitation, HTN, thoracic aortic dissection s/p emergent surgery c/b CVA with residual left-sided weakness, colostomy for bowel ischemia s/p reversal, and psoriasis on Humira, who presented to [facility] on 11/02/2020 with worsening SOB with associated chest pressure and LE edema; found to have mod-severe MR and AR, now s/p sternotomy for CABG x2, AVR, and MVR. Hospital Course complicated by SANTOS now requiring HD, acute blood loss anemia s/p multiple PRBCs, and atrial flutter. Admitted for multidisciplinary rehab program.    #Comprehensive Multidisciplinary Rehab Program:  - Gait, ADL, Functional impairments  - PT/OT/ SLP 3 hours a day 5 days a week    #CAD with AR and MR, s/p sternotomy  with CABG x2, AVR, and MVR  - repeat TTE prior to discharge on 11/23 with EF 40%  - Daily INR; c/w Coumadin  - c/w ASA 81mg daily  - c/w atorvastatin 40mg qhs  - c/w Metoprolol tartrate 50mg q8h and atorvastatin 40mg qhs  - c/w Coumadin, INR goal 2-3    #Atrial flutter  - c/w Coumadin and Metoprolol  - c/w Amiodarone 200mg daily    #ATN  - s/p Permcath by IR on 11/20  - renal consult; HD MWF  - avoid nephrotoxic agents  - Retacrit with HD    #History of seizures  - c/w Keppra 750mg BID    #HLD  - c/w Atorvastatin    #Mood  - c/w Seroquel 25mg qhs    #Sleep:  - Maintain quiet hours and low stim environment  - Melatonin PRN    #Pain:  - Tylenol PRN  - c/w gabapentin 100mg qhs  - avoid sedating meds that may affect cognitive recovery    #GI/Bowel:  - Senna 2 tabs daily  - GI ppx: Protonix 40mg daily    #/Bladder:  - Monitor PVR if no void in 8h; SC for >400 cc  - Toileting schedule q4h    #Diet / Dysphagia:    - Diet: Regular, DASH/TLC with fluid restriction 1.5L  - Nutrition to follow    #Skin/ Pressure Injury Prevention:  - assessment on admission   - Incisions: sternotomy  - Turn Q2hrs in bed while awake, OOB to Chair, PT/OT/SLP     #DVT prophylaxis:  - Coumadin  - SCDs    #Precautions/ Restrictions  - Falls, Cardiac, Sternal  - Lungs: Aspiration, Incentive Spirometer    - COVID PCR: neg on 11/29    --------------------------------------------  Outpatient Follow up:  Dr. Javy Butler (CT surgery)  Dr. Lazaro Conway (Nephrology)  --------------------------------------------      MEDICAL PROGNOSIS: GOOD            REHAB POTENTIAL: GOOD             ESTIMATED DISPOSITION: HOME WITH HOME CARE            ELOS: 10-14 Days   EXPECTED THERAPY:     P.T. 1hr/day       O.T. 1hr/day      S.L.P. 1hr/day     P&O Unnecessary     EXP FREQUENCY: 5 days per 7 day period     PRESCREEN COMPARISON:   I have reviewed the prescreen information and I have found no relevant changes between the preadmission screening and my post admission evaluation     RATIONALE FOR INPATIENT ADMISSION - Patient demonstrates the following: (check all that apply)  [X] Medically appropriate for rehabilitation admission  [X] Has attainable rehab goals with an appropriate initial discharge plan  [X] Has rehabilitation potential (expected to make a significant improvement within a reasonable period of time)   [X] Requires close medical management by a rehab physician, rehab nursing care, Hospitalist and comprehensive interdisciplinary team (including PT, OT, & or SLP, Prosthetics and Orthotics) JONATHAN GIBSON is a 59yo M with PMHx of CAD s/p PCI to LAD, aortic regurgitation, HTN, thoracic aortic dissection s/p emergent surgery c/b CVA with residual left-sided weakness, colostomy for bowel ischemia s/p reversal, and psoriasis on Humira, who presented to [facility] on 11/02/2020 with worsening SOB with associated chest pressure and LE edema; found to have mod-severe MR and AR, now s/p sternotomy for CABG x2, AVR, and MVR. Hospital Course complicated by SANTOS now requiring HD, acute blood loss anemia s/p multiple PRBCs, and atrial flutter. Admitted for multidisciplinary rehab program.    #Comprehensive Multidisciplinary Rehab Program:  - Gait, ADL, Functional impairments  - PT/OT/ SLP 3 hours a day 5 days a week    #CAD with AR and MR, s/p sternotomy  with CABG x2, AVR, and MVR  - repeat TTE prior to discharge on 11/23 with EF 40%  - Daily INR; c/w Coumadin  - c/w ASA 81mg daily  - c/w atorvastatin 40mg qhs  - c/w Metoprolol tartrate 50mg q8h and atorvastatin 40mg qhs  - c/w Coumadin, INR goal 2-3    #Atrial flutter  - c/w Coumadin and Metoprolol  - c/w Amiodarone 200mg daily    #ATN  - s/p Permcath by IR on 11/20  - renal consult; HD MWF  - avoid nephrotoxic agents  - Retacrit with HD    #History of seizures  - c/w Keppra 750mg BID    #HLD  - c/w Atorvastatin    #Mood  - c/w Seroquel 25mg qhs    #Sleep:  - Maintain quiet hours and low stim environment  - Melatonin PRN    #Pain:  - Tylenol PRN  - c/w gabapentin 100mg qhs  - avoid sedating meds that may affect cognitive recovery    #GI/Bowel:  - Senna 2 tabs daily  - GI ppx: Protonix 40mg daily    #/Bladder:  - Monitor PVR if no void in 8h; SC for >400 cc  - Toileting schedule q4h    #Diet / Dysphagia:    - Diet: Regular, DASH/TLC with fluid restriction 1.5L  - Nutrition to follow    #Skin/ Pressure Injury Prevention:  - assessment on admission   - Incisions: sternotomy and left LE vein harvest incisions  - Turn Q2hrs in bed while awake, OOB to Chair, PT/OT/SLP     #DVT prophylaxis:  - Coumadin  - SCDs    #Precautions/ Restrictions  - Falls, Cardiac, Sternal  - Lungs: Aspiration, Incentive Spirometer    - COVID PCR: neg on 11/29    --------------------------------------------  Outpatient Follow up:  Dr. Javy Butler (CT surgery)  Dr. Lazaro Conway (Nephrology)  --------------------------------------------      MEDICAL PROGNOSIS: GOOD            REHAB POTENTIAL: GOOD             ESTIMATED DISPOSITION: HOME WITH HOME CARE            ELOS: 10-14 Days   EXPECTED THERAPY:     P.T. 1hr/day       O.T. 1hr/day      S.L.P. 1hr/day     P&O Unnecessary     EXP FREQUENCY: 5 days per 7 day period     PRESCREEN COMPARISON:   I have reviewed the prescreen information and I have found no relevant changes between the preadmission screening and my post admission evaluation     RATIONALE FOR INPATIENT ADMISSION - Patient demonstrates the following: (check all that apply)  [X] Medically appropriate for rehabilitation admission  [X] Has attainable rehab goals with an appropriate initial discharge plan  [X] Has rehabilitation potential (expected to make a significant improvement within a reasonable period of time)   [X] Requires close medical management by a rehab physician, rehab nursing care, Hospitalist and comprehensive interdisciplinary team (including PT, OT, & or SLP, Prosthetics and Orthotics)

## 2020-11-30 NOTE — PROGRESS NOTE ADULT - PROBLEM SELECTOR PROBLEM 4
Acute diastolic heart failure
CVA (cerebral vascular accident)
ESRD (end stage renal disease)
SANTOS (acute kidney injury)
Acute diastolic heart failure
SANTOS (acute kidney injury)
SANTOS (acute kidney injury)
ESRD (end stage renal disease)
SANTOS (acute kidney injury)
Acute diastolic heart failure
Acute diastolic heart failure
ESRD (end stage renal disease)
ESRD (end stage renal disease)
Acute diastolic heart failure

## 2020-11-30 NOTE — PROGRESS NOTE ADULT - PROBLEM SELECTOR PLAN 5
Continue supportive care.
Hx of CVA   Continue ASA, atorvastatin.
Hx of CVA   Continue ASA, atorvastatin.
Hx of CVA   Continue ASA, atorvastatin. when PO intake available.
Hx of CVA   Continue ASA, atorvastatin. when PO intake available.
vitamin K 11/19 as vascular sx needs INR 1.5-2 for permacath insertion 11/20
Hx of CVA   Continue ASA, atorvastatin. when PO intake available.
vitamin K 11/19 as vascular sx needed INR < 1.5 for permacath insertion 11/20  Coumadin held 11/22 PM for elevated INR 2.61
Hx of CVA   Continue ASA, atorvastatin. when PO intake available.
supportive care
supportive care
vitamin K 11/19 as vascular sx needed INR < 1.5 for permacath insertion 11/20  Now resuming daily Coumadin dosing for therapeutic INR
vitamin K 11/19 as vascular sx needed INR < 1.5 for permacath insertion 11/20  Now resuming daily Coumadin dosing for therapeutic INR
supportive care
Required Vit K for supratherapeutic INR again on 11/23  Daily INR

## 2020-11-30 NOTE — H&P ADULT - HISTORY OF PRESENT ILLNESS
JONATHAN GIBSON is a 59yo M with PMHx of CAD s/p PCI to LAD, aortic regurgitation, HTN, thoracic aortic dissection s/p emergent surgery c/b CVA with residual left-sided weakness, colostomy for bowel ischemia s/p reversal, and psoriasis on Humira, who presented to [facility] on 11/02/2020 with worsening SOB with associated chest pressure and LE edema. In the ED, had elevated pro-BNP >33k, SCr, acute anemia, and HTN emergency. Evaluated on admission by cardiology; symptoms improved following IV Lasix. Echocardiogram on admission showing EF 50-55%, enlarged LA, mod-sever MR, mod TR, mod-severe AR, and enlarged aortic root.     Patient evaluated by CT surgery for potential AVR/MVR; required CVVHD medical optimization. GI consulted for acute anemia; patient underwent EGD on 11/09 with no significant findings. Patient underwent sternotomy for CABG x2, AVR, and MVR on 11/13; post-op c/b acute blood loss anemia requiring multiple blood products, pressors, and inotropes. Patient was extubated on 11/16 and underwent permacath placement with IR on 11/20. Pressors and inotropes weaned off and transferred to floors on 11/28. Repeat TTE with doppler showing EF 40% with mod LV systolic dysfunction.    Patient was evaluated by PM&R and therapy for functional deficits and gait/ ADL impairments and recommended acute rehabilitation. Patient was medically optimized for discharge to [facility] on 11/30/2020. JONATHAN GIBSON is a 59yo M with PMHx of CAD s/p PCI to LAD, aortic regurgitation, HTN, thoracic aortic dissection s/p emergent surgery c/b CVA with residual left-sided weakness, colostomy for bowel ischemia s/p reversal, and psoriasis on Humira, who presented to [facility] on 11/02/2020 with worsening SOB with associated chest pressure and LE edema. In the ED, had elevated pro-BNP >33k, SCr, acute anemia, and HTN emergency. Evaluated on admission by cardiology; symptoms improved following IV Lasix. Echocardiogram on admission showing EF 50-55%, enlarged LA, mod-sever MR, mod TR, mod-severe AR, and enlarged aortic root.     Patient evaluated by CT surgery for potential AVR/MVR; required CVVHD medical optimization. GI consulted for acute anemia; patient underwent EGD on 11/09 with no significant findings. Patient underwent sternotomy for CABG x2, AVR, and MVR on 11/13; post-op c/b acute blood loss anemia requiring multiple blood products, pressors, and inotropes. Patient was extubated on 11/16 and underwent permacath placement with IR on 11/20. Pressors and inotropes weaned off and transferred to floors on 11/28. Repeat TTE with doppler showing EF 40% with mod LV systolic dysfunction.    Patient was evaluated by PM&R and therapy for functional deficits and gait/ ADL impairments and recommended acute rehabilitation. Patient was medically optimized for discharge to [facility] on 11/30/2020.    Patient was seen and examined at the bedside on arrival. Denied HA, fever, chills, coughing, wheezing, SOB, or abdominal pain. Last BM was this morning. Appetite has returned, requesting dinner. Endorses some weakness and numbness on the left LE 2/2 residual deficits from prior stroke. Had some low back pain prior to arrival, but now resolved.

## 2020-11-30 NOTE — DISCHARGE NOTE PROVIDER - PROVIDER TOKENS
PROVIDER:[TOKEN:[[id number]]],PROVIDER:[TOKEN:[[id number]]],PROVIDER:[TOKEN:[[id number]]]

## 2020-11-30 NOTE — PROGRESS NOTE ADULT - PROBLEM SELECTOR PLAN 6
History of seizures.  Continue Keppra.    Discussed with Dr. Butler.
Hx of CVA   Continue ASA and atorvastatin  PT/OT  PM&R following
INR again supratherapeutic at 3.25 on 11/27  INR stable on 1mg   INR today 2.52 continue 1mg  Continue daily INR and Coumadin for goal INR 1.5-2.0
INR again supratherapeutic at 3.25 with Coumadin 1mg ordered last night.  Repeat INR later this AM.   Continue daily INR and Coumadin for goal INR 1.5-2.0
INR again supratherapeutic at 3.59 with Coumadin dosing held last night.  Repeat INR later this AM.   Continue daily INR and Coumadin for goal INR 1.5-2.0
History of seizures.  Continue Keppra.    Discussed with Dr. Butler.
Hx of CVA   Continue ASA and atorvastatin  PT/OT  PM&R following
History of seizures.  Continue Keppra.    Discussed with Dr. Butler.
Required Vit K for supratherapeutic INR again on 11/23  Daily INR  Daily Coumadin
Hx of CVA   Continue ASA and atorvastatin  PT/OT  PM&R following
Hx of CVA   Continue ASA and atorvastatin  PT/OT  PM&R following
No further episodes since 11/23  Daily INR  Daily Coumadin
Pt again supratherapeutic on 2.5 mg Coumadin  Vitamin K 1 mg given with repeat INR 4.39 from 5.10 yesterday  Repeat INR later this am  Continue daily INR and Coumadin for goal INR 1.5-2.0
Hx of CVA   Continue ASA and atorvastatin  PT/OT  PM&R following, suggesting SEAN

## 2020-11-30 NOTE — PROGRESS NOTE ADULT - PROBLEM SELECTOR PROBLEM 5
Acute diastolic heart failure
CVA (cerebral vascular accident)
Seizures
Supratherapeutic INR
CVA (cerebral vascular accident)
Supratherapeutic INR
CVA (cerebral vascular accident)
CVA (cerebral vascular accident)
Acute diastolic heart failure
CVA (cerebral vascular accident)
Acute diastolic heart failure
Supratherapeutic INR
Supratherapeutic INR
Acute diastolic heart failure
Supratherapeutic INR

## 2020-11-30 NOTE — CHART NOTE - NSCHARTNOTEFT_GEN_A_CORE
Source: Patient [ ]  Family [ ]   other [x ] pt off the unit    Current Diet: Diet, Regular:   DASH/TLC {Sodium & Cholesterol Restricted} (DASH)  1500mL Fluid Restriction ([id number])  For patients receiving Renal Replacement - No Protein Restr, No Conc K, No Conc Phos, Low  Sodium (RENAL)  Supplement Feeding Modality:  Oral  Nepro Cans or Servings Per Day:  2       Frequency:  Daily (11-30-20 @ 09:51)    PO intake:  Pt noted with decreased po intake at meals.    Current Weight:   (11/20) 205.4 lbs  11/21: 224.4 lbs  11/17: 246 lbs  11/15: 250.8 lbs  11/9: 196.9 lbs    11/7: 201.96 lbs    % Weight Change - wt fluctuations noted, will continue to monitor.  Aware pt with 2+ mild generalized edema and 3+ moderate B/L leg edema noted.    Pertinent Medications: MEDICATIONS  (STANDING):  aMIOdarone    Tablet   Oral   aMIOdarone    Tablet 200 milliGRAM(s) Oral daily  ascorbic acid 500 milliGRAM(s) Oral daily  aspirin enteric coated 81 milliGRAM(s) Oral daily  atorvastatin 40 milliGRAM(s) Oral at bedtime  chlorhexidine 2% Cloths 1 Application(s) Topical daily  epoetin yolanda-epbx (RETACRIT) Injectable 62264 Unit(s) IV Push <User Schedule>  folic acid 1 milliGRAM(s) Oral daily  gabapentin 100 milliGRAM(s) Oral at bedtime  iron sucrose IVPB 100 milliGRAM(s) IV Intermittent every 7 days  levETIRAcetam 750 milliGRAM(s) Oral two times a day  melatonin 5 milliGRAM(s) Oral at bedtime  methylphenidate 5 milliGRAM(s) Oral <User Schedule>  metoprolol tartrate 50 milliGRAM(s) Oral every 8 hours  multivitamin 1 Tablet(s) Oral daily  pantoprazole    Tablet 40 milliGRAM(s) Oral two times a day  QUEtiapine 25 milliGRAM(s) Oral at bedtime  sodium chloride 0.9% lock flush 3 milliLiter(s) IV Push every 8 hours    MEDICATIONS  (PRN):  acetaminophen   Tablet .. 650 milliGRAM(s) Oral every 6 hours PRN Mild Pain (1 - 3)  benzocaine 15 mG/menthol 3.6 mG (Sugar-Free) Lozenge 1 Lozenge Oral three times a day PRN Sore Throat  lidocaine   Patch 1 Patch Transdermal every 24 hours PRN as needed    Pertinent Labs: CBC Full  -  ( 30 Nov 2020 06:54 )  WBC Count : 14.29 K/uL  RBC Count : 2.95 M/uL  Hemoglobin : 8.7 g/dL  Hematocrit : 28.2 %  Platelet Count - Automated : 193 K/uL  Mean Cell Volume : 95.6 fl  Mean Cell Hemoglobin : 29.5 pg  Mean Cell Hemoglobin Concentration : 30.9 gm/dL  11-30 Na136 mmol/L Glu 96 mg/dL K+ 4.4 mmol/L Cr  5.17 mg/dL<H> BUN 76.0 mg/dL<H> Phos 2.6 mg/dL Alb n/a   PAB n/a       Skin: sx MSI    Nutrition focused physical exam previously conducted - found signs of malnutrition [ ]absent [ x]present    Subcutaneous fat loss: [ ] Orbital fat pads region, [ ]Buccal fat region, [ ]Triceps region,  [ ]Ribs region    Muscle wasting: [ ]Temples region, [ ]Clavicle region, [ ]Shoulder region, [ ]Scapula region, [ ]Interosseous region,  [ ]thigh region, [ ]Calf region    Current Nutrition Diagnosis: Pt remains at high nutrition risk secondary to moderate-acute protein calorie malnutrition related to inability to consume sufficient protein energy intake with poor appetite s/p reop C2V, AVR/MVR, SANTOS requiring CVVHD-now stopped and transitioned to traditional HD as evidenced by pt meeting <75% estimated energy requirements > 7 days and mild muscle wasting/fat loss.  Pt continues with decreased po intake at meals.  Aware pt likes chocolate Ensure as noted from previous nutrition note.     Recommendations:   1. RX: Ensure Enlive TID   2. Consider changing MVI to Nephro-jeanie daily   3. Continue with folic acid and Vit. C daily  4. Monitor daily wts     Monitoring and Evaluation:   [x ] PO intake [x ] Tolerance to diet prescription [X] Weights  [X] Follow up per protocol [X] Labs:

## 2020-11-30 NOTE — PROGRESS NOTE ADULT - PROBLEM SELECTOR PLAN 4
Hx of CVA continue ASA, atorvastatin, and Keppra
S/p RIJ permacath on 11/20.  HD per nephrology  HD 11/27 for UF.   No plans currently for AVF as per Vascular Surgery.  Adjust meds per CrCl.  Hold nephrotoxic meds.  Bladder scan daily.  Strict I/Os.   Trend BMP.
S/p RIJ permacath on 11/20.  HD per nephrology  Last HD 11/27 for UF.  Next HD planned for tomorrow Monday 11/30.    No plans currently for AVF as per Vascular Surgery.  Adjust meds per CrCl.  Hold nephrotoxic meds.  Bladder scan daily.  Strict I/Os.   Trend BMP.
S/p RIJ permacath on 11/20.  HD per nephrology today 11/30  No plans currently for AVF as per Vascular Surgery.  Adjust meds per CrCl.  Hold nephrotoxic meds.  Bladder scan daily.  Strict I/Os.   Trend BMP.
Trend renal function Cr.   CVVHD as per renal   Oliguric.  Renal ultrasound 11/2 without hydronephrosis.
Trend renal function Cr.   CVVHD as per renal   Oliguric.  Renal ultrasound 11/2 without hydronephrosis.
Trend renal function Cr. peaked 3.3.  Now with dialysis catheter.  Continue HD as per renal   Renal ultrasound 11/2 without hydronephrosis.
Trend renal function Cr. peaked 3.3.  Now with dialysis catheter.  Continue HD as per renal today after CTA head and neck.  Renal ultrasound 11/2 without hydronephrosis.
supportive care
Trend renal function Cr.   CVVHD as per renal   Oliguric.  Renal ultrasound 11/2 without hydronephrosis.
supportive care
Trend renal function Cr. peaked 3.3.  Now with dialysis catheter.  Continue HD as per renal . Likely HD later in AM.  Oliguric.  Renal ultrasound 11/2 without hydronephrosis.
Trend renal function Cr. peaked 3.3.  Now with dialysis catheter.  Continue HD as per renal . Likely HD later in AM.  Oliguric.  Renal ultrasound 11/2 without hydronephrosis.
Trend renal function Cr.   CVVHD as per renal   Oliguric.  Renal ultrasound 11/2 without hydronephrosis.
s/p permacath on 11/20  HD per nephrology  s/p HD session 11/24 UF only  No plans currently for AVF as per Vascular Surgery  adjust meds per CrCl  Hold nephrotoxic meds  bladder scan daily  Straight Cath 11/25 am for resiual > 350 with no urge to urinate  Trend BMP
s/p permacath on 11/20  HD per nephrology  s/p HD session 11/24 UF only  No plans currently for AVF as per Vascular Surgery  adjust meds per CrCl  Hold nephrotoxic meds  bladder scan daily  Straight Cath 11/25 am for residual > 350 with no urge to urinate  Trend BMP
supportive care
supportive care
s/p permacath on 11/20  HD per nephrology  HD scheduled for later today as per Renal team  No plans currently for AVF as per Vascular Surgery  adjust meds per CrCl  Hold nephrotoxic meds  bladder scan daily  Straight Cath 11/25 am for residual > 350 with no urge to urinate  Trend BMP
supportive care

## 2020-11-30 NOTE — CHART NOTE - NSCHARTNOTESELECT_GEN_ALL_CORE
Malnutrition Notification
Addendum/Event Note
CTS/Transfer Note
CTU/Transfer Note
Event Note
Event Note/CTS
Event Note/acute change
Nutrition Services
Post Op check

## 2020-11-30 NOTE — PROGRESS NOTE ADULT - PROBLEM SELECTOR PLAN 1
S/p Reop AVR(t), MVR (t), CABG X 2 11/13.  Neurologically at baseline (intermittent confusion).  Hemodynamically stable.  Trend H/H.   Continue aspirin for acute graft closure prophylaxis.  Continue statin for chronic graft patency prophylaxis.  Continue Lopressor 50 mg q6 hrs and PO Amio for rate control.   Follow up AM EKG.  Encourage coughing and deep breathing exercises/incentive spirometry use hourly.   Ambulate with PT assist as tolerated.  Analgesics for pain control PRN.   Continue bowel regimen PRN.   Patient refusing BiPAP at night.   . S/p Reop AVR(t), MVR (t), CABG X 2 11/13.  Neurologically at baseline (intermittent confusion).  Hemodynamically stable.  Trend H/H.   Continue aspirin for acute graft closure prophylaxis.  Continue statin for chronic graft patency prophylaxis.  Change Lopressor 50 mg q8hrs and continue PO Amio for rate control.   Follow up AM EKG.  Encourage coughing and deep breathing exercises/incentive spirometry use hourly.   Ambulate with PT assist as tolerated.  Analgesics for pain control PRN.   Continue bowel regimen PRN.   Patient refusing BiPAP at night.   .

## 2020-11-30 NOTE — DISCHARGE NOTE PROVIDER - NSDCMRMEDTOKEN_GEN_ALL_CORE_FT
amLODIPine 5 mg oral tablet: 1 tab(s) orally once a day  aspirin 81 mg oral delayed release tablet: 1 tab(s) orally once a day  atenolol 50 mg oral tablet: 1 tab(s) orally once a day  docusate sodium 100 mg oral capsule: 1 cap(s) orally 3 times a day  folic acid 1 mg oral tablet: 1 tab(s) orally once a day  Humira 40 mg/0.8 mL subcutaneous kit: every other week  Keppra 750 mg oral tablet: 1 tab(s) orally 2 times a day  Lasix 40 mg oral tablet: 1 tab(s) orally once a day   lisinopril 5 mg oral tablet: 1 tab(s) orally once a day  omeprazole 20 mg oral delayed release capsule: 1 cap(s) orally once a day  pravastatin 20 mg oral tablet: 1 tab(s) orally once a day  senna oral tablet: 2 tab(s) orally once a day (at bedtime)  Vitamin D3 50,000 intl units oral capsule: 1 cap(s) orally once a month   acetaminophen 325 mg oral tablet: 2 tab(s) orally every 6 hours, As needed, Mild Pain (1 - 3)  amiodarone 200 mg oral tablet: 1 tab(s) orally once a day for 30 days  ascorbic acid 500 mg oral tablet: 1 tab(s) orally once a day  aspirin 81 mg oral delayed release tablet: 1 tab(s) orally once a day  atorvastatin 40 mg oral tablet: 1 tab(s) orally once a day (at bedtime)  epoetin yolanda: as per HD  folic acid 1 mg oral tablet: 1 tab(s) orally once a day  gabapentin 100 mg oral capsule: 1 cap(s) orally once a day (at bedtime)  levETIRAcetam 750 mg oral tablet: 1 tab(s) orally 2 times a day  lidocaine 5% topical film: Apply topically to affected area once a day, 12 hr on, 12hr off  melatonin 5 mg oral tablet: 1 tab(s) orally once a day (at bedtime)  menthol-benzocaine 3.6 mg-15 mg mucous membrane lozenge:  mucous membrane   methylphenidate 5 mg oral tablet: 1 tab(s) orally   metoprolol tartrate 50 mg oral tablet: 1 tab(s) orally every 8 hours  Multiple Vitamins oral tablet: 1 tab(s) orally once a day  pantoprazole 40 mg oral delayed release tablet: 1 tab(s) orally 2 times a day  QUEtiapine 25 mg oral tablet: 1 tab(s) orally once a day (at bedtime)

## 2020-11-30 NOTE — PROGRESS NOTE ADULT - PROBLEM SELECTOR PROBLEM 3
Anemia due to other cause
ESRD (end stage renal disease)
SANTOS (acute kidney injury)
SANTOS (acute kidney injury)
Anemia due to other cause
ESRD (end stage renal disease)
Anemia due to other cause
ESRD (end stage renal disease)
ESRD (end stage renal disease)
Anemia due to other cause
ESRD (end stage renal disease)

## 2020-11-30 NOTE — DISCHARGE NOTE PROVIDER - NSDCFUADDAPPT_GEN_ALL_CORE_FT
Upon discharge from rehab, make a follow up appointment with Dr Butler by calling [phone]  .  Follow up with your cardiologist and primary care doctor within 7-10 days after discharge. Sternal wound precautions, Blood glucose fingerstick checks qAC/HS, daily weights, DASH diet, ensure supplements bid, daily shower,  PT/OT Rehab  per rehab facility. BMP, CBC twice a week. CXR weekly. Vitals per routine. Staples to remain in place minimum of 2 weeks after surgery. Will be removed by surgeon in office.    Call Dr. Butler office on D/C from rehab to schedule post op appointment [phone]    Cardiac surgery office located at [address] [facility] [address] [facility]  Office telephone

## 2020-11-30 NOTE — DISCHARGE NOTE PROVIDER - CARE PROVIDER_API CALL
Javy Butler)  Surgery; Thoracic and Cardiac Surgery  [address] [place] [address]  Phone: [phone]  Fax: [phone]  Follow Up Time:     Ramon Sharpe  INTERNAL MEDICINE  [address] [facility], [place] [address]  Phone: [phone]  Fax: [phone]  Follow Up Time:     Laurent Morales)  Medicine  Nephrology  [address] [place] [address]  Phone: [phone]  Fax: [phone]  Follow Up Time:

## 2020-11-30 NOTE — H&P ADULT - NSHPREVIEWOFSYSTEMS_GEN_ALL_CORE
REVIEW OF SYSTEMS  Constitutional: No fever, No Chills, No fatigue  HEENT: No eye pain, No visual disturbances, No difficulty hearing, No Dysphagia   Pulm: No cough,  No shortness of breath  Cardio: No chest pain, No palpitations  GI:  No abdominal pain, No nausea, No vomiting, No diarrhea, No constipation, No incontinence, Last Bowel Movement on   : No dysuria, No frequency, No hematuria, No incontinence  Neuro: No headaches, No memory loss, No loss of strength, No numbness, No tremors  Skin: No itching, No rashes, No lesions   Endo: No temperature intolerance  MSK: No joint pain, No joint swelling, No muscle pain, No Neck or back pain  Psych:  No depression, No anxiety REVIEW OF SYSTEMS  Constitutional: No fever, No Chills  HEENT: No visual disturbances  Pulm: No cough,  No shortness of breath  Cardio: No chest pain, No palpitations  GI:  No abdominal pain, No nausea, No vomiting, No diarrhea, No constipation, No incontinence, Last Bowel Movement on 11/30/2020  : No dysuria  Neuro: Weakness and numbness in left LE from prior stroke. No headaches  Skin: No itching, No rashes, No lesions   MSK: No joint pain, No joint swelling, No muscle pain, No Neck or back pain

## 2020-11-30 NOTE — PROGRESS NOTE ADULT - PROBLEM SELECTOR PLAN 8
History of seizures.  Continue Keppra.
SCDs for DVT prophylaxis  no chemical PPX d/t supratherapeutic INR  Protonix for GI prophylaxis  bowel regimen PRN
SCDs for DVT prophylaxis  Protonix for GI prophylaxis
History of seizures.  Continue Keppra.
History of seizures.  Continue Keppra.
SCDs for DVT prophylaxis  Protonix for GI prophylaxis
SCDs for DVT prophylaxis  Protonix for GI prophylaxis
History of seizures.  Continue Keppra.
SCDs for DVT prophylaxis  Protonix for GI prophylaxis

## 2020-11-30 NOTE — DISCHARGE NOTE PROVIDER - NSDCFUADDINST_GEN_ALL_CORE_FT
Please call the Cardiothoracic Surgery office at [phone] if you are experiencing any shortness of breath, chest pain, fevers or chills, drainage from the incisions, persistent nausea, vomiting or if you have any questions about your medications. If the symptoms are severe, call 911 and go to the nearest hospital. You can also call [phone] for an emergency [facility] ambulance, which will take you to the closest [facility].    If you need any assistance for making any appointments for a new consult or referral in any specialty, please call our [facility] Clinical Coordination Center at [phone].  Your Care Navigator Nurse Practitioner will be in touch to see you in your home within a few days from discharge. The Follow Your Heart program can help ensure you understand your medications, discharge instructions and answer any questions you may have at that time. They are also a great source to address concerns during the day and may be reached at [phone].

## 2020-11-30 NOTE — DISCHARGE NOTE PROVIDER - INSTRUCTIONS
Choose lean meats and poultry without skin and prepare them without added saturated/trans fat. Choose fish at least twice a week, especially those high in omega-3 (salmon or trout). Select fat-free or low-fat dairy products. To lower cholesterol, reduce saturated fat to no more than 5 to 6 percent of total calories. For someone eating 2,000 calories a day, that’s about 13 grams of saturated fat.  Cut back on beverages and foods with added sugars.  Choose and prepare foods with little or no salt. To lower blood pressure, reducing daily intake of sodium to 1,500 mg is desirable because it can lower blood pressure.  If you drink alcohol, drink sparingly and NOT if you are taking narcotics for pain. Follow the American Heart Association recommendations when you eat out, and keep an eye on your portion sizes.

## 2020-11-30 NOTE — PROGRESS NOTE ADULT - PROBLEM SELECTOR PLAN 2
Continue Amio 200 daily.  Continue Lopressor 50 mg q6.  Remains in NSR.  Continuous telemetry.  Cardiology following. Continue Amio 200 daily.  Continue Lopressor 50 mg q8.  Remains in NSR.  Continuous telemetry.  Cardiology following.

## 2020-11-30 NOTE — DISCHARGE NOTE PROVIDER - NSDCPNSUBOBJ_GEN_ALL_CORE
Progress Note:   · Provider Specialty  CT Surgery    Reason for Admission:   Reason for Admission:  · Reason for Admission  11/13 Reop AVR(T), MVR(T), C2V    Telehealth:   Telehealth:  · Telehealth?  No      · Subjective and Objective:   Subjective:  61 yo male OOB in chair, no acute distress, denies any SOB, chest pain states "he is feeling a big better"    Tele:  Aflutter Hr 90's    V/S:                  T(F): 97.7 (11-30-20 @ 09:30), Max: 97.9 (11-30-20 @ 05:37)  HR: 93 (11-30-20 @ 09:30) (63 - 97)  BP: 108/63 (11-30-20 @ 09:30) (108/63 - 123/77)  RR: 18 (11-30-20 @ 09:30) (17 - 21)  SpO2: 97% (11-30-20 @ 09:30) (97% - 100%)  Wt(kg):   93.2 (11-30-20 @ 06:46)  101.3 (11-29-20 @ 06:59)  92.7 (11-28-20 @ 06:26)  96.9 (11-27-20 @ 13:23)      LV EF: 50%    Labs:  11-30    136  |  98  |  76.0<H>  ----------------------------<  96  4.4   |  23.0  |  5.17<H>    Ca    8.0<L>      30 Nov 2020 06:54  Phos  2.6     11-30  Mg     2.0     11-30                                 8.7    14.29 )-----------( 193      ( 30 Nov 2020 06:54 )             28.2        PT/INR - ( 30 Nov 2020 06:54 )   PT: 28.0 sec;   INR: 2.52 ratio           CAPILLARY BLOOD GLUCOSE    CXR:   Findings:  Post cardiac surgery changes. Cardiomegaly. The lungs are clear. No evidence of pleural effusion or pneumothorax. Again noted is a right IJ central line..    Impression:  Stable exam since the prior day..    < end of copied text >    I&O's Detail    29 Nov 2020 07:01  -  30 Nov 2020 07:00  --------------------------------------------------------  IN:  Oral Fluid: 840 mL  Total IN: 840 mL    OUT:    Voided (mL): 100 mL  Total OUT: 100 mL    Total NET: 740 mL    CHEST TUBE:  [ ] YES [X ] NO    DESMOND DRAIN:   [ ] YES [X ] NO  O  EPICARDIAL WIRES:  [ ] YES [X] NO    BOWEL MOVEMENT:  [X] YES [ ] NO        Medications:  acetaminophen   Tablet .. 650 milliGRAM(s) Oral every 6 hours PRN  aMIOdarone    Tablet   Oral   aMIOdarone    Tablet 200 milliGRAM(s) Oral daily  ascorbic acid 500 milliGRAM(s) Oral daily  aspirin enteric coated 81 milliGRAM(s) Oral daily  atorvastatin 40 milliGRAM(s) Oral at bedtime  benzocaine 15 mG/menthol 3.6 mG (Sugar-Free) Lozenge 1 Lozenge Oral three times a day PRN  chlorhexidine 2% Cloths 1 Application(s) Topical daily  epoetin yolanda-epbx (RETACRIT) Injectable 93833 Unit(s) IV Push <User Schedule>  folic acid 1 milliGRAM(s) Oral daily  gabapentin 100 milliGRAM(s) Oral at bedtime  iron sucrose IVPB 100 milliGRAM(s) IV Intermittent every 7 days  levETIRAcetam 750 milliGRAM(s) Oral two times a day  lidocaine   Patch 1 Patch Transdermal every 24 hours PRN  melatonin 5 milliGRAM(s) Oral at bedtime  methylphenidate 5 milliGRAM(s) Oral <User Schedule>  metoprolol tartrate 50 milliGRAM(s) Oral every 8 hours  multivitamin 1 Tablet(s) Oral daily  pantoprazole    Tablet 40 milliGRAM(s) Oral two times a day  QUEtiapine 25 milliGRAM(s) Oral at bedtime  sodium chloride 0.9% lock flush 3 milliLiter(s) IV Push every 8 hours    Physical Exam:  General: WN/WD NAD  Neurology: A&Ox3, baseline slurred/ garbled speech   Respiratory: Bibasilar crackles L>R,  CV:  S1S2, no murmurs, rubs or gallops  Abdominal: Soft, NT, ND +BS,   Extremities: 1-2+ Le edema, + peripheral pulses  Skin: MidSternal incision stable, no click, dressing c/d/i, Left inner thigh c/d/i, Chest tube sites JEROME, no erythremia noted          Assessment and Plan:   · Assessment    60 year old male with PMH 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     Ultimately, patient arrived with acute diastolic heart failure, moderate-severe MR, moderate TR, mod-severe AI, SANTOS on CKD requiring HD (now with RIJ permacath placed 11/20), and anemia (likely d/t chronic renal disease, GI w/u unremarkable). LHC revealed severe 2 vessel CAD and known residual Type B dissection.     Patient underwent resternotomy, CABG x 2 (SVG to OM, RPDA), AVR, MVR on 11/13 with Dr. Butler. Intra-op acute blood loss anemia and coagulopathy noted.    Post-operative course notable for cardiogenic shock (resolved), acute blood loss anemia (improved), acute hypoxemic respiratory failure (resolved), junctional rhythm with periods of ventricular standstill (now resolved, rhythm remains sinus tach/Aflutter 100s), and Supratherapeutic INR.    11/30: Dialysis today, if patient tolerate dialysis well plan for d/c to rehab      Nutritional Assessment:  · Nutritional Assessment  This patient has been assessed with a concern for Malnutrition and has been determined to have a diagnosis/diagnoses of Moderate protein-calorie malnutrition.    This patient is being managed with:   Diet Regular-  DASH/TLC {Sodium & Cholesterol Restricted} (DASH)  1500mL Fluid Restriction ([id number])  For patients receiving Renal Replacement - No Protein Restr No Conc K No Conc Phos Low  Sodium (RENAL)  Supplement Feeding Modality:  Oral  Nepro Cans or Servings Per Day:  2       Frequency:  Daily  Entered: Nov 30 2020  9:51AM    Problem/Plan - 1:  ·  Problem: Mitral and aortic regurgitation.  Plan: S/p Reop AVR(t), MVR (t), CABG X 2 11/13.  Neurologically at baseline (intermittent confusion).  Hemodynamically stable.  Trend H/H.   Continue aspirin for acute graft closure prophylaxis.  Continue statin for chronic graft patency prophylaxis.  Change Lopressor 50 mg q8hrs and continue PO Amio for rate control.   Follow up AM EKG.  Encourage coughing and deep breathing exercises/incentive spirometry use hourly.   Ambulate with PT assist as tolerated.  Analgesics for pain control PRN.   Continue bowel regimen PRN.   Patient refusing BiPAP at night.   .      Problem/Plan - 2:  ·  Problem: Atrial flutter.  Plan: Continue Amio 200 daily.  Continue Lopressor 50 mg q8.  Remains in NSR.  Continuous telemetry.  Cardiology following.      Problem/Plan - 3:  ·  Problem: Anemia due to other cause.  Plan: Trend H/H.   Transfuse PRN.     Problem/Plan - 4:  ·  Problem: ESRD (end stage renal disease).  Plan: S/p RIJ permacath on 11/20.  HD per nephrology today 11/30  No plans currently for AVF as per Vascular Surgery.  Adjust meds per CrCl.  Hold nephrotoxic meds.  Bladder scan daily.  Strict I/Os.   Trend BMP.     Problem/Plan - 5:  ·  Problem: Acute diastolic heart failure.  Plan: Continue supportive care.     Problem/Plan - 6:  Problem: Supratherapeutic INR. Plan: INR again supratherapeutic at 3.25 on 11/27  INR stable on 1mg   INR today 2.52 continue 1mg  Continue daily INR and Coumadin for goal INR 1.5-2.0.    Problem/Plan - 7:  ·  Problem: CVA (cerebral vascular accident).  Plan: Hx of CVA.  Continue ASA and atorvastatin.  Continue PT/OT.  PM&R following, suggesting AR.     Problem/Plan - 8:  ·  Problem: Seizures.  Plan: History of seizures.  Continue Keppra.     Problem/Plan - 9:  ·  Problem: Need for prophylactic measure.  Plan: SCDs, Coumadin for DVT prophylaxis.  Protonix for GI prophylaxis.  Plan for d/c today after dialysis   Plan discussed with Dr. Butler in AM rounds  Discussed with CT Surgery attending / team on AM rounds.        Electronic Signatures:  Carlita Garner)  (Signed 30-Nov-2020 13:39)  	Authored: Progress Note, Reason for Admission, Telehealth, Subjective and Objective, Assessment and Plan      Last Updated: 30-Nov-2020 13:39 by Carlita Garner (NP)

## 2020-11-30 NOTE — PROGRESS NOTE ADULT - PROBLEM SELECTOR PROBLEM 8
Need for prophylactic measure
Seizures
Need for prophylactic measure
Seizures
Need for prophylactic measure
Need for prophylactic measure
Seizures
Seizures
Need for prophylactic measure

## 2020-11-30 NOTE — DISCHARGE NOTE PROVIDER - NSDCCPTREATMENT_GEN_ALL_CORE_FT
PRINCIPAL PROCEDURE  Procedure: Reoperation with repair or replacement of aortic and mitral valves  Findings and Treatment: reop AVR 25mm CE [id number], MVR 31mm Magna Ease, CABGx2 RSGV-OM, RPDA

## 2020-11-30 NOTE — DISCHARGE NOTE PROVIDER - NSDCACTIVITY_GEN_ALL_CORE
No heavy lifting/straining/Showering allowed/Do not make important decisions/Stairs allowed/Walking - Indoors allowed/Do not drive or operate machinery/Walking - Outdoors allowed

## 2020-11-30 NOTE — DISCHARGE NOTE PROVIDER - REASON FOR ADMISSION
Admitted for shortness of breath, anemia, now  s/p resternotomy, CABG x 2 (SVG to OM, RPDA), AVR, MVR on 11/13 with Dr. Butler 11/13 reop AVR (T), MVR (T), C2V

## 2020-11-30 NOTE — PROGRESS NOTE ADULT - PROBLEM SELECTOR PLAN 9
SCDs, Coumadin for DVT prophylaxis.  Protonix for GI prophylaxis.  Plan for d/c today after dialysis   Plan discussed with Dr. Butler in AM rounds  Plan of care to be discussed further with CT Surgery attending / team on AM rounds.  Possible discharge to Acute Rehab early this week. SCDs, Coumadin for DVT prophylaxis.  Protonix for GI prophylaxis.  Plan for d/c today after dialysis   Plan discussed with Dr. Butler in AM rounds  Discussed with CT Surgery attending / team on AM rounds.

## 2020-11-30 NOTE — PROGRESS NOTE ADULT - PROBLEM SELECTOR PROBLEM 6
CVA (cerebral vascular accident)
Seizures
Supratherapeutic INR
Seizures
CVA (cerebral vascular accident)
Seizures
Supratherapeutic INR
CVA (cerebral vascular accident)
CVA (cerebral vascular accident)
Supratherapeutic INR
Supratherapeutic INR
CVA (cerebral vascular accident)

## 2020-11-30 NOTE — PROGRESS NOTE ADULT - REASON FOR ADMISSION
Preop Reop CABG / MVR/ AVR
htn
sob
"SOB"
sob
11/13 Reop AVR(T), MVR(T), C2V
sob

## 2020-11-30 NOTE — PROGRESS NOTE ADULT - SUBJECTIVE AND OBJECTIVE BOX
[facility] DIVISION OF KIDNEY DISEASES AND HYPERTENSION -- HEMODIALYSIS NOTE  --------------------------------------------------------------------------------  Chief Complaint: ESRD/Ongoing hemodialysis requirement    24 hour events/subjective:    PAST HISTORY  --------------------------------------------------------------------------------  No significant changes to PMH, PSH, FHx, SHx, unless otherwise noted    ALLERGIES & MEDICATIONS  --------------------------------------------------------------------------------  Allergies    penicillin (Unknown)    Standing Inpatient Medications  aMIOdarone    Tablet   Oral   aMIOdarone    Tablet 200 milliGRAM(s) Oral daily  ascorbic acid 500 milliGRAM(s) Oral daily  aspirin enteric coated 81 milliGRAM(s) Oral daily  atorvastatin 40 milliGRAM(s) Oral at bedtime  chlorhexidine 2% Cloths 1 Application(s) Topical daily  epoetin yolanda-epbx (RETACRIT) Injectable 21127 Unit(s) IV Push <User Schedule>  folic acid 1 milliGRAM(s) Oral daily  gabapentin 100 milliGRAM(s) Oral at bedtime  iron sucrose IVPB 100 milliGRAM(s) IV Intermittent every 7 days  levETIRAcetam 750 milliGRAM(s) Oral two times a day  melatonin 5 milliGRAM(s) Oral at bedtime  methylphenidate 5 milliGRAM(s) Oral <User Schedule>  metoprolol tartrate 50 milliGRAM(s) Oral every 8 hours  multivitamin 1 Tablet(s) Oral daily  pantoprazole    Tablet 40 milliGRAM(s) Oral two times a day  QUEtiapine 25 milliGRAM(s) Oral at bedtime  sodium chloride 0.9% lock flush 3 milliLiter(s) IV Push every 8 hours    PRN Inpatient Medications  acetaminophen   Tablet .. 650 milliGRAM(s) Oral every 6 hours PRN  benzocaine 15 mG/menthol 3.6 mG (Sugar-Free) Lozenge 1 Lozenge Oral three times a day PRN  lidocaine   Patch 1 Patch Transdermal every 24 hours PRN      REVIEW OF SYSTEMS  --------------------------------------------------------------------------------  Gen: No weight changes, fatigue, fevers/chills, weakness  Skin: No rashes  Head/Eyes/Ears/Mouth: No headache; Normal hearing; Normal vision w/o blurriness; No sinus pain/discomfort, sore throat  Respiratory: No dyspnea, cough, wheezing, hemoptysis  CV: No chest pain, PND, orthopnea  GI: No abdominal pain, diarrhea, constipation, nausea, vomiting, melena, hematochezia  : No increased frequency, dysuria, hematuria, nocturia  MSK: No joint pain/swelling; no back pain; no edema  Neuro: No dizziness/lightheadedness, weakness, seizures, numbness, tingling  Heme: No easy bruising or bleeding  Endo: No heat/cold intolerance  Psych: No significant nervousness, anxiety, stress, depression    All other systems were reviewed and are negative, except as noted.    VITALS/PHYSICAL EXAM  --------------------------------------------------------------------------------  T(C): 36.4 (11-30-20 @ 12:46), Max: 36.6 (11-29-20 @ 21:58)  HR: 99 (11-30-20 @ 13:19) (63 - 99)  BP: 128/72 (11-30-20 @ 13:19) (108/63 - 131/97)  RR: 18 (11-30-20 @ 12:46) (17 - 21)  SpO2: 100% (11-30-20 @ 12:46) (97% - 100%)    11-29-20 @ 07:01  -  11-30-20 @ 07:00  --------------------------------------------------------  IN: 840 mL / OUT: 100 mL / NET: 740 mL    Physical Exam:  	Gen: NAD, well-appearing  	HEENT: PERRL, supple neck,   	Pulm: CTA B/L  	CV: RRR, S1S2; no rub  	Back: No spinal or CVA tenderness; + sacral edema  	Abd: +BS, soft, nontender/nondistended  	: No suprapubic tenderness  	UE: Warm, FROM, no clubbing, intact strength; no edema; no asterixis  	LE: Warm, FROM, no clubbing, intact strength; no edema  	Neuro: No focal deficits, Limited  gait  	Psych: Normal affect and mood  	Skin: Warm, without rashes  	Vascular access: TDC,     LABS/STUDIES  --------------------------------------------------------------------------------              8.7    14.29 >-----------<  193      [11-30-20 @ 06:54]              28.2     136  |  98  |  76.0  ----------------------------<  96      [11-30-20 @ 06:54]  4.4   |  23.0  |  5.17        Ca     8.0     [11-30-20 @ 06:54]      Mg     2.0     [11-30-20 @ 06:54]      Phos  2.6     [11-30-20 @ 06:54]    PT/INR: PT 28.0 , INR 2.52       [11-30-20 @ 06:54]    Iron 29, TIBC 332, %sat 9      [11-03-20 @ 08:18]  Ferritin 230      [11-03-20 @ 08:18]  TSH 1.95      [11-03-20 @ 08:18]  Lipid: chol --, , HDL --, LDL --      [11-15-20 @ 02:40]    HBsAb <3.0      [11-07-20 @ 05:41]  HBsAg Nonreact      [11-07-20 @ 05:41]  HBcAb Nonreact      [11-07-20 @ 05:41]  HCV 0.07, Nonreact      [11-07-20 @ 05:41]

## 2020-11-30 NOTE — PROGRESS NOTE ADULT - SUBJECTIVE AND OBJECTIVE BOX
Subjective:  59 yo male OOB in chair, no acute distress, denies any SOB, chest pain states "he is feeling a big better"    Tele:  Aflutter Hr 90's    V/S:                  T(F): 97.7 (11-30-20 @ 09:30), Max: 97.9 (11-30-20 @ 05:37)  HR: 93 (11-30-20 @ 09:30) (63 - 97)  BP: 108/63 (11-30-20 @ 09:30) (108/63 - 123/77)  RR: 18 (11-30-20 @ 09:30) (17 - 21)  SpO2: 97% (11-30-20 @ 09:30) (97% - 100%)  Wt(kg):   93.2 (11-30-20 @ 06:46)  101.3 (11-29-20 @ 06:59)  92.7 (11-28-20 @ 06:26)  96.9 (11-27-20 @ 13:23)      LV EF: 50%    Labs:  11-30    136  |  98  |  76.0<H>  ----------------------------<  96  4.4   |  23.0  |  5.17<H>    Ca    8.0<L>      30 Nov 2020 06:54  Phos  2.6     11-30  Mg     2.0     11-30                                 8.7    14.29 )-----------( 193      ( 30 Nov 2020 06:54 )             28.2        PT/INR - ( 30 Nov 2020 06:54 )   PT: 28.0 sec;   INR: 2.52 ratio           CAPILLARY BLOOD GLUCOSE    CXR:   Findings:  Post cardiac surgery changes. Cardiomegaly. The lungs are clear. No evidence of pleural effusion or pneumothorax. Again noted is a right IJ central line..    Impression:  Stable exam since the prior day..    < end of copied text >    I&O's Detail    29 Nov 2020 07:01  -  30 Nov 2020 07:00  --------------------------------------------------------  IN:  Oral Fluid: 840 mL  Total IN: 840 mL    OUT:    Voided (mL): 100 mL  Total OUT: 100 mL    Total NET: 740 mL    CHEST TUBE:  [ ] YES [X ] NO    DESMOND DRAIN:   [ ] YES [X ] NO  O  EPICARDIAL WIRES:  [ ] YES [X] NO    BOWEL MOVEMENT:  [X] YES [ ] NO        Medications:  acetaminophen   Tablet .. 650 milliGRAM(s) Oral every 6 hours PRN  aMIOdarone    Tablet   Oral   aMIOdarone    Tablet 200 milliGRAM(s) Oral daily  ascorbic acid 500 milliGRAM(s) Oral daily  aspirin enteric coated 81 milliGRAM(s) Oral daily  atorvastatin 40 milliGRAM(s) Oral at bedtime  benzocaine 15 mG/menthol 3.6 mG (Sugar-Free) Lozenge 1 Lozenge Oral three times a day PRN  chlorhexidine 2% Cloths 1 Application(s) Topical daily  epoetin yolanda-epbx (RETACRIT) Injectable 03221 Unit(s) IV Push <User Schedule>  folic acid 1 milliGRAM(s) Oral daily  gabapentin 100 milliGRAM(s) Oral at bedtime  iron sucrose IVPB 100 milliGRAM(s) IV Intermittent every 7 days  levETIRAcetam 750 milliGRAM(s) Oral two times a day  lidocaine   Patch 1 Patch Transdermal every 24 hours PRN  melatonin 5 milliGRAM(s) Oral at bedtime  methylphenidate 5 milliGRAM(s) Oral <User Schedule>  metoprolol tartrate 50 milliGRAM(s) Oral every 8 hours  multivitamin 1 Tablet(s) Oral daily  pantoprazole    Tablet 40 milliGRAM(s) Oral two times a day  QUEtiapine 25 milliGRAM(s) Oral at bedtime  sodium chloride 0.9% lock flush 3 milliLiter(s) IV Push every 8 hours    Physical Exam:  General: WN/WD NAD  Neurology: A&Ox3, baseline slurred/ garbled speech   Respiratory: Bibasilar crackles L>R,  CV:  S1S2, no murmurs, rubs or gallops  Abdominal: Soft, NT, ND +BS,   Extremities: 1-2+ Le edema, + peripheral pulses  Skin: MidSternal incision stable, no click, dressing c/d/i, Left inner thigh c/d/i, Chest tube sites JEROME, no erythremia noted     Subjective:  61 yo male OOB in chair, no acute distress, denies any SOB, chest pain states "he is feeling a big better"    Tele:  Aflutter Hr 90's    V/S:                  T(F): 97.7 (11-30-20 @ 09:30), Max: 97.9 (11-30-20 @ 05:37)  HR: 93 (11-30-20 @ 09:30) (63 - 97)  BP: 108/63 (11-30-20 @ 09:30) (108/63 - 123/77)  RR: 18 (11-30-20 @ 09:30) (17 - 21)  SpO2: 97% (11-30-20 @ 09:30) (97% - 100%)  Wt(kg):   93.2 (11-30-20 @ 06:46)  101.3 (11-29-20 @ 06:59)  92.7 (11-28-20 @ 06:26)  96.9 (11-27-20 @ 13:23)      LV EF: 50%    Labs:  11-30    136  |  98  |  76.0<H>  ----------------------------<  96  4.4   |  23.0  |  5.17<H>    Ca    8.0<L>      30 Nov 2020 06:54  Phos  2.6     11-30  Mg     2.0     11-30                                 8.7    14.29 )-----------( 193      ( 30 Nov 2020 06:54 )             28.2        PT/INR - ( 30 Nov 2020 06:54 )   PT: 28.0 sec;   INR: 2.52 ratio           CAPILLARY BLOOD GLUCOSE    CXR:   Findings:  Post cardiac surgery changes. Cardiomegaly. The lungs are clear. No evidence of pleural effusion or pneumothorax. Again noted is a right IJ central line..    Impression:  Stable exam since the prior day..    < end of copied text >    I&O's Detail    29 Nov 2020 07:01  -  30 Nov 2020 07:00  --------------------------------------------------------  IN:  Oral Fluid: 840 mL  Total IN: 840 mL    OUT:    Voided (mL): 100 mL  Total OUT: 100 mL    Total NET: 740 mL    CHEST TUBE:  [ ] YES [X ] NO    DESMOND DRAIN:   [ ] YES [X ] NO  O  EPICARDIAL WIRES:  [ ] YES [X] NO    BOWEL MOVEMENT:  [X] YES [ ] NO        Medications:  acetaminophen   Tablet .. 650 milliGRAM(s) Oral every 6 hours PRN  aMIOdarone    Tablet   Oral   aMIOdarone    Tablet 200 milliGRAM(s) Oral daily  ascorbic acid 500 milliGRAM(s) Oral daily  aspirin enteric coated 81 milliGRAM(s) Oral daily  atorvastatin 40 milliGRAM(s) Oral at bedtime  benzocaine 15 mG/menthol 3.6 mG (Sugar-Free) Lozenge 1 Lozenge Oral three times a day PRN  chlorhexidine 2% Cloths 1 Application(s) Topical daily  epoetin yolanda-epbx (RETACRIT) Injectable 47897 Unit(s) IV Push <User Schedule>  folic acid 1 milliGRAM(s) Oral daily  gabapentin 100 milliGRAM(s) Oral at bedtime  iron sucrose IVPB 100 milliGRAM(s) IV Intermittent every 7 days  levETIRAcetam 750 milliGRAM(s) Oral two times a day  lidocaine   Patch 1 Patch Transdermal every 24 hours PRN  melatonin 5 milliGRAM(s) Oral at bedtime  methylphenidate 5 milliGRAM(s) Oral <User Schedule>  metoprolol tartrate 50 milliGRAM(s) Oral every 8 hours  multivitamin 1 Tablet(s) Oral daily  pantoprazole    Tablet 40 milliGRAM(s) Oral two times a day  QUEtiapine 25 milliGRAM(s) Oral at bedtime  sodium chloride 0.9% lock flush 3 milliLiter(s) IV Push every 8 hours    Physical Exam:  General: WN/WD NAD  Neurology: A&Ox3, baseline slurred/ garbled speech   Respiratory: Bibasilar crackles L>R,  CV:  S1S2, no murmurs, rubs or gallops  Abdominal: Soft, NT, ND +BS,   Extremities: 1-2+ Le edema, + peripheral pulses  Skin: MidSternal incision stable, no click, dressing c/d/i, Left inner thigh c/d/i, Chest tube sites JEROME, no erythremia noted

## 2020-12-01 LAB
ALBUMIN SERPL ELPH-MCNC: 2 G/DL — LOW (ref 3.3–5)
ALP SERPL-CCNC: 108 U/L — SIGNIFICANT CHANGE UP (ref 40–120)
ALT FLD-CCNC: 26 U/L — SIGNIFICANT CHANGE UP (ref 10–45)
ANION GAP SERPL CALC-SCNC: 10 MMOL/L — SIGNIFICANT CHANGE UP (ref 5–17)
AST SERPL-CCNC: 30 U/L — SIGNIFICANT CHANGE UP (ref 10–40)
BILIRUB SERPL-MCNC: 0.9 MG/DL — SIGNIFICANT CHANGE UP (ref 0.2–1.2)
BUN SERPL-MCNC: 52 MG/DL — HIGH (ref 7–23)
CALCIUM SERPL-MCNC: 8.2 MG/DL — LOW (ref 8.4–10.5)
CHLORIDE SERPL-SCNC: 102 MMOL/L — SIGNIFICANT CHANGE UP (ref 96–108)
CO2 SERPL-SCNC: 28 MMOL/L — SIGNIFICANT CHANGE UP (ref 22–31)
CREAT SERPL-MCNC: 3.94 MG/DL — HIGH (ref 0.5–1.3)
GLUCOSE SERPL-MCNC: 83 MG/DL — SIGNIFICANT CHANGE UP (ref 70–99)
HCT VFR BLD CALC: 26.4 % — LOW (ref 39–50)
HGB BLD-MCNC: 8 G/DL — LOW (ref 13–17)
INR BLD: 1.71 RATIO — HIGH (ref 0.88–1.16)
MCHC RBC-ENTMCNC: 29 PG — SIGNIFICANT CHANGE UP (ref 27–34)
MCHC RBC-ENTMCNC: 30.3 GM/DL — LOW (ref 32–36)
MCV RBC AUTO: 95.7 FL — SIGNIFICANT CHANGE UP (ref 80–100)
NRBC # BLD: 0 /100 WBCS — SIGNIFICANT CHANGE UP (ref 0–0)
PLATELET # BLD AUTO: 181 K/UL — SIGNIFICANT CHANGE UP (ref 150–400)
POTASSIUM SERPL-MCNC: 4.7 MMOL/L — SIGNIFICANT CHANGE UP (ref 3.5–5.3)
POTASSIUM SERPL-SCNC: 4.7 MMOL/L — SIGNIFICANT CHANGE UP (ref 3.5–5.3)
PROT SERPL-MCNC: 6.1 G/DL — SIGNIFICANT CHANGE UP (ref 6–8.3)
PROTHROM AB SERPL-ACNC: 20.2 SEC — HIGH (ref 10.6–13.6)
RBC # BLD: 2.76 M/UL — LOW (ref 4.2–5.8)
RBC # FLD: 15.6 % — HIGH (ref 10.3–14.5)
SARS-COV-2 RNA SPEC QL NAA+PROBE: SIGNIFICANT CHANGE UP
SODIUM SERPL-SCNC: 140 MMOL/L — SIGNIFICANT CHANGE UP (ref 135–145)
WBC # BLD: 12.66 K/UL — HIGH (ref 3.8–10.5)
WBC # FLD AUTO: 12.66 K/UL — HIGH (ref 3.8–10.5)

## 2020-12-01 PROCEDURE — 99223 1ST HOSP IP/OBS HIGH 75: CPT | Mod: GC,AI

## 2020-12-01 PROCEDURE — 99223 1ST HOSP IP/OBS HIGH 75: CPT

## 2020-12-01 RX ORDER — METOPROLOL TARTRATE 50 MG
25 TABLET ORAL EVERY 12 HOURS
Refills: 0 | Status: DISCONTINUED | OUTPATIENT
Start: 2020-12-01 | End: 2020-12-15

## 2020-12-01 RX ORDER — WARFARIN SODIUM 2.5 MG/1
2 TABLET ORAL ONCE
Refills: 0 | Status: COMPLETED | OUTPATIENT
Start: 2020-12-01 | End: 2020-12-01

## 2020-12-01 RX ORDER — NYSTATIN CREAM 100000 [USP'U]/G
1 CREAM TOPICAL
Refills: 0 | Status: DISCONTINUED | OUTPATIENT
Start: 2020-12-01 | End: 2020-12-15

## 2020-12-01 RX ADMIN — ATORVASTATIN CALCIUM 40 MILLIGRAM(S): 80 TABLET, FILM COATED ORAL at 21:51

## 2020-12-01 RX ADMIN — Medication 5 MILLIGRAM(S): at 11:38

## 2020-12-01 RX ADMIN — GABAPENTIN 100 MILLIGRAM(S): 400 CAPSULE ORAL at 21:51

## 2020-12-01 RX ADMIN — NYSTATIN CREAM 1 APPLICATION(S): 100000 CREAM TOPICAL at 06:07

## 2020-12-01 RX ADMIN — Medication 500 MILLIGRAM(S): at 11:38

## 2020-12-01 RX ADMIN — PANTOPRAZOLE SODIUM 40 MILLIGRAM(S): 20 TABLET, DELAYED RELEASE ORAL at 18:16

## 2020-12-01 RX ADMIN — Medication 6 MILLIGRAM(S): at 21:51

## 2020-12-01 RX ADMIN — Medication 50 MILLIGRAM(S): at 15:18

## 2020-12-01 RX ADMIN — LEVETIRACETAM 750 MILLIGRAM(S): 250 TABLET, FILM COATED ORAL at 18:16

## 2020-12-01 RX ADMIN — Medication 1 TABLET(S): at 11:38

## 2020-12-01 RX ADMIN — LEVETIRACETAM 750 MILLIGRAM(S): 250 TABLET, FILM COATED ORAL at 06:05

## 2020-12-01 RX ADMIN — QUETIAPINE FUMARATE 25 MILLIGRAM(S): 200 TABLET, FILM COATED ORAL at 21:51

## 2020-12-01 RX ADMIN — WARFARIN SODIUM 2 MILLIGRAM(S): 2.5 TABLET ORAL at 21:51

## 2020-12-01 RX ADMIN — AMIODARONE HYDROCHLORIDE 200 MILLIGRAM(S): 400 TABLET ORAL at 06:05

## 2020-12-01 RX ADMIN — Medication 50 MILLIGRAM(S): at 06:05

## 2020-12-01 RX ADMIN — Medication 1 MILLIGRAM(S): at 11:38

## 2020-12-01 RX ADMIN — PANTOPRAZOLE SODIUM 40 MILLIGRAM(S): 20 TABLET, DELAYED RELEASE ORAL at 06:05

## 2020-12-01 RX ADMIN — Medication 81 MILLIGRAM(S): at 11:38

## 2020-12-01 RX ADMIN — Medication 5 MILLIGRAM(S): at 06:05

## 2020-12-01 RX ADMIN — NYSTATIN CREAM 1 APPLICATION(S): 100000 CREAM TOPICAL at 18:16

## 2020-12-01 NOTE — DIETITIAN INITIAL EVALUATION ADULT. - MALNUTRITION
Severe Malnutrition Malnutrition Alert Placed in Electronic Medical Record - Pending Signature Moderate Malnutrition

## 2020-12-01 NOTE — DIETITIAN INITIAL EVALUATION ADULT. - PHYSCIAL ASSESSMENT
Temporalis, Orbital Fat Pads, Buccal Fat Pads, Bicep & Tricep Wasting Observed  (Per Nutrition Focused Physical Exam)

## 2020-12-01 NOTE — PROGRESS NOTE ADULT - ASSESSMENT
61yo M with PMHx of CAD s/p PCI to LAD, aortic regurgitation, HTN, thoracic aortic dissection s/p emergent surgery c/b CVA with residual left-sided weakness, colostomy for bowel ischemia s/p reversal, and psoriasis on Humira, who presented to [facility] on 11/02/2020 with worsening SOB with associated chest pressure and LE edema; found to have mod-severe MR and AR, now s/p sternotomy for CABG x2, AVR, and MVR. Hospital Course complicated by SANTOS now requiring HD, acute blood loss anemia s/p multiple PRBCs, and atrial flutter. Admitted for multidisciplinary rehab program.    #Comprehensive Multidisciplinary Rehab Program:  - PT/OT/ 3 hours a day 5 days a week    #CAD with AR and MR, s/p sternotomy  with CABG x2, AVR, and MVR  - repeat TTE prior to discharge on 11/23 with EF 40%  - Daily INR; c/w Coumadin  - c/w ASA 81mg daily  - c/w atorvastatin 40mg qhs  - c/w Metoprolol tartrate 50mg q8h and atorvastatin 40mg qhs  - c/w Coumadin, INR goal 2-3  on ritalin - will dc     #Atrial flutter  - c/w Coumadin and Metoprolol  - c/w Amiodarone 200mg daily    #ATN  - s/p Permcath by IR on 11/20  - renal consult; HD MWF  - Retacrit with HD    #History of seizures:- c/w Keppra 750mg BID    #HLD:- c/w Atorvastatin    #Mood:- c/w Seroquel 25mg qhs    #Sleep: Melatonin PRN, on quetiapine - ? for sleep     #Pain:- Tylenol PRN  - c/w gabapentin 100mg qhs      #GI/Bowel:  - Senna 2 tabs daily  - GI ppx: Protonix 40mg daily    #/Bladder: Toileting schedule q4h    #Diet: Regular, DASH/TLC with fluid restriction 1.5L- ? reason for fluid restriction   - Nutrition to follow    #Skin/ Pressure Injury Prevention:- Incisions: sternotomy and left LE vein harvest incisions  - Turn Q2hrs in bed while awake, OOB to Chair, PT/OT    #DVT prophylaxis:- Coumadin    Labs with leucocytosis: Improving    Hospitalist consult for management of medical comorbidities  Nephrology consult for HD

## 2020-12-01 NOTE — DIETITIAN INITIAL EVALUATION ADULT. - ADD RECOMMEND
1) Monitor Weights, Intake, Tolerance, Skin & Labwork   2) Education Provided on Need for Supplementation 3) Continue Nutrition Plan of Care

## 2020-12-01 NOTE — DIETITIAN INITIAL EVALUATION ADULT. - ORAL INTAKE PTA/DIET HISTORY
on Renal, DASH-TLC Diet w/ Thin Liquids, 1,500ml/day Fluid Restriction & Nepro 8oz BID   Education Provided on Need for Supplementation     Patient Does Not Follow Diet @Home & Doesn't Take Vitamin/Supplements @Home

## 2020-12-01 NOTE — DIETITIAN INITIAL EVALUATION ADULT. - PERTINENT MEDS FT
ASA, Pacerone, Lipitor, Retacrit, Gabapentin, Keppra, Metoprolol, Quetiapine, Vitamin C, Folic Acid, Melatonin, Multivitamin,

## 2020-12-01 NOTE — DIETITIAN INITIAL EVALUATION ADULT. - FLUID ACCUMULATION
+2 Edema Noted to Bilateral Lower Extremities    (as Per Documentation) - (Potential for Weight Fluctuations)

## 2020-12-01 NOTE — PROGRESS NOTE ADULT - SUBJECTIVE AND OBJECTIVE BOX
HPI:  JONATHAN GIBSON is a 59yo M with PMHx of CAD s/p PCI to LAD, aortic regurgitation, HTN, thoracic aortic dissection s/p emergent surgery c/b CVA with residual left-sided weakness, colostomy for bowel ischemia s/p reversal, and psoriasis on Humira, who presented to [facility] on 2020 with worsening SOB with associated chest pressure and LE edema. In the ED, had elevated pro-BNP >33k, SCr, acute anemia, and HTN emergency. Evaluated on admission by cardiology; symptoms improved following IV Lasix. Echocardiogram on admission showing EF 50-55%, enlarged LA, mod-sever MR, mod TR, mod-severe AR, and enlarged aortic root.   Patient evaluated by CT surgery for potential AVR/MVR; required CVVHD medical optimization. GI consulted for acute anemia; patient underwent EGD on  with no significant findings. Patient underwent sternotomy for CABG x2, AVR, and MVR on ; post-op c/b acute blood loss anemia requiring multiple blood products, pressors, and inotropes. Patient was extubated on  and underwent permacath placement with IR on . Pressors and inotropes weaned off and transferred to floors on . Repeat TTE with doppler showing EF 40% with mod LV systolic dysfunction.  Patient was evaluated by PM&R and therapy for functional deficits and gait/ ADL impairments and recommended acute rehabilitation. Patient was medically optimized for discharge to [facility] on 2020.    TODAY'S SUBJECTIVE & REVIEW OF SYMPTOMS  Patient seen at bedside  seated in chair  Denies any acute pain     Denied HA, fever, chills, coughing, wheezing, SOB, or abdominal pain.   States that he still makes some urine         PHYSICAL EXAM    Vital Signs Last 24 Hrs  T(C): 36.4 (01 Dec 2020 07:42), Max: 36.9 (2020 15:23)  T(F): 97.6 (01 Dec 2020 07:42), Max: 98.4 (2020 15:23)  HR: 89 (01 Dec 2020 07:42) (87 - 99)  BP: 118/73 (01 Dec 2020 07:42) (113/69 - 136/82)  BP(mean): --  RR: 16 (01 Dec 2020 07:42) (16 - 18)  SpO2: 97% (01 Dec 2020 07:42) (95% - 97%)  Daily Height in cm: 172.72 (2020 18:31)    Daily Weight in k.9 (01 Dec 2020 05:58)      Constitutional - NAD, Comfortable  Chest - CTAB  Cardiovascular - RRR  Abdomen - BS+, Soft, NTND  Extremities - No C/C/E, No calf tenderness   Neurologic Exam -                    Cognitive - Awake, Alert, AAO to self, place, date, year, situation     No facial weakness     Motor left sided weakness from prior stroke      Psychiatric - Mood stable, Affect WNL  Skin:  Sternal incision from sternotomy, no staples in place; appears c/d/i. Left LE incision from vein harvesting, staples in place mild erythema surround incision; appears c/d/i. Right inner thigh incision, as well with staples in place; appears c/d/i. Stage 1 sacral lesion  Lines: Right IJ Permacath on the right chest wall. Midline in right medial arm      MEDICATIONS  (STANDING):  aMIOdarone    Tablet 200 milliGRAM(s) Oral daily  ascorbic acid 500 milliGRAM(s) Oral daily  aspirin enteric coated 81 milliGRAM(s) Oral daily  atorvastatin 40 milliGRAM(s) Oral at bedtime  epoetin yolanda-epbx (RETACRIT) Injectable 95695 Unit(s) IV Push <User Schedule>  folic acid 1 milliGRAM(s) Oral daily  gabapentin 100 milliGRAM(s) Oral at bedtime  influenza   Vaccine 0.5 milliLiter(s) IntraMuscular once  levETIRAcetam 750 milliGRAM(s) Oral two times a day  melatonin 6 milliGRAM(s) Oral at bedtime  methylphenidate 5 milliGRAM(s) Oral <User Schedule>  metoprolol tartrate 50 milliGRAM(s) Oral every 8 hours  multivitamin 1 Tablet(s) Oral daily  nystatin Cream 1 Application(s) Topical two times a day  pantoprazole    Tablet 40 milliGRAM(s) Oral two times a day  QUEtiapine 25 milliGRAM(s) Oral at bedtime    MEDICATIONS  (PRN):  acetaminophen   Tablet .. 650 milliGRAM(s) Oral every 6 hours PRN Temp greater or equal to 38C (100.4F), Mild Pain (1 - 3)  ALBUTerol    90 MICROgram(s) HFA Inhaler 2 Puff(s) Inhalation every 6 hours PRN Shortness of Breath and/or Wheezing  benzocaine 15 mG/menthol 3.6 mG (Sugar-Free) Lozenge 1 Lozenge Oral two times a day PRN Sore Throat  guaiFENesin   Syrup  (Sugar-Free) 200 milliGRAM(s) Oral every 6 hours PRN Cough      CAPILLARY BLOOD GLUCOSE        RECENT LABS/IMAGING                        8.0    12.66 )-----------( 181      ( 01 Dec 2020 05:50 )             26.4     12-    140  |  102  |  52<H>  ----------------------------<  83  4.7   |  28  |  3.94<H>    Ca    8.2<L>      01 Dec 2020 05:50  Phos  2.6     11-30  Mg     2.0     11-30    TPro  6.1  /  Alb  2.0<L>  /  TBili  0.9  /  DBili  x   /  AST  30  /  ALT  26  /  AlkPhos  108  12    PT/INR - ( 01 Dec 2020 10:38 )   PT: 20.2 sec;   INR: 1.71 ratio

## 2020-12-01 NOTE — CONSULT NOTE ADULT - ASSESSMENT
61yo Male with hx of CAD s/p PCI to LAD, aortic regurgitation, HTN, thoracic aortic dissection s/p emergent surgery c/b CVA with residual left-sided weakness, colostomy for bowel ischemia s/p reversal, and psoriasis on Humira, presented to [facility] for acute rehab from [facility] with complaints of cp a/w SOB and LE swelling, noted to have enlarged LA, mod-sever MR, mod TR, mod-severe AR, and enlarged aortic root. Pt s/p CABG x2, AVR, and MVR on 11/13. Pt was medically optimized with CVVHD. Post op complications with acute blood loss anemia requiring multiple blood products. Pt was stable for discharge to acute rehab.    #Debility  -Comprehensive Multidisciplinary Rehab Program  -Gait, ADL, Functional impairments  -PT/OT/ SLP 3 hours a day 5 days a week    #CAD with AR and MR  #Combined systolic/diastolic CHF  -s/p sternotomy  with CABG x2, AVR, and MVR 11/13  -c/w ASA/Statin/Metoprolol  -Monitor vitals    #Atrial flutter  -c/w Coumadin and Metoprolol  -c/w Amiodarone 200mg daily  -INR Goal 2-3    #ATN  -No indication of CKD from trending renal function. Currently renal failure is acute  -renal consult; HD MWF  -avoid nephrotoxic agents  -Retacrit with HD    #History of seizures  - c/w Keppra 750mg BID    #DVT prophylaxis:  - Coumadin  - SCDs

## 2020-12-01 NOTE — DIETITIAN INITIAL EVALUATION ADULT. - OTHER INFO
Initial Nutrition Assessment   60yr Old Male   Dx: AVR  Diet - Renal DASH-TLC Diet w/ Thin Liquids, 1,500ml/day Fluid Restriction & Nepro 8oz BID  Denies Food Allergy/Intolerance  Tolerates Diet Well - No Chewing/Swallowing Complications (Per Patient)  Surgical Incision on Left Groin & Sternum and No Pressure Ulcers (as Per Nursing Flow Sheets)  +2 Edema Noted to Bilateral Lower Extremities (as Per Nursing Flow Sheets)  No Recent Nausea/Vomiting/Diarrhea/Constipation (as Per Patient)

## 2020-12-01 NOTE — CONSULT NOTE ADULT - SUBJECTIVE AND OBJECTIVE BOX
HPI:  61yo Male with hx of CAD s/p PCI to LAD, aortic regurgitation, HTN, thoracic aortic dissection s/p emergent surgery c/b CVA with residual left-sided weakness, colostomy for bowel ischemia s/p reversal, and psoriasis on Humira, presented to [facility] for acute rehab from [facility] with complaints of cp a/w SOB and LE swelling, noted to have enlarged LA, mod-sever MR, mod TR, mod-severe AR, and enlarged aortic root. Pt was evaluated by CTS and underwent sternotomy for CABG x2, AVR, and MVR on 11/13. Pt was medically optimized with CVVHD. Post op complications with acute blood loss anemia requiring multiple blood products. Pt was stable for discharge to acute rehab.    Patient was seen and examined at the bedside. No acute events overnight  Pt denies cp, palpitations, sob, abd pain, N/V, fever, chills      Home Medications:  acetaminophen 325 mg oral tablet: 2 tab(s) orally every 6 hours, As needed, Mild Pain (1 - 3) (30 Nov 2020 15:21)  amiodarone 200 mg oral tablet: 1 tab(s) orally once a day for 30 days (30 Nov 2020 15:21)  ascorbic acid 500 mg oral tablet: 1 tab(s) orally once a day (30 Nov 2020 15:21)  aspirin 81 mg oral delayed release tablet: 1 tab(s) orally once a day (15 Oct 2019 09:55)  atorvastatin 40 mg oral tablet: 1 tab(s) orally once a day (at bedtime) (30 Nov 2020 15:21)  epoetin yolanda: as per HD (30 Nov 2020 15:21)  folic acid 1 mg oral tablet: 1 tab(s) orally once a day (30 Nov 2020 15:21)  gabapentin 100 mg oral capsule: 1 cap(s) orally once a day (at bedtime) (30 Nov 2020 15:21)  levETIRAcetam 750 mg oral tablet: 1 tab(s) orally 2 times a day (30 Nov 2020 15:21)  lidocaine 5% topical film: Apply topically to affected area once a day, 12 hr on, 12hr off (30 Nov 2020 15:21)  melatonin 5 mg oral tablet: 1 tab(s) orally once a day (at bedtime) (30 Nov 2020 15:21)  menthol-benzocaine 3.6 mg-15 mg mucous membrane lozenge:  mucous membrane  (30 Nov 2020 15:21)  methylphenidate 5 mg oral tablet: 1 tab(s) orally  (30 Nov 2020 15:21)  metoprolol tartrate 50 mg oral tablet: 1 tab(s) orally every 8 hours (30 Nov 2020 15:21)  Multiple Vitamins oral tablet: 1 tab(s) orally once a day (30 Nov 2020 15:21)  pantoprazole 40 mg oral delayed release tablet: 1 tab(s) orally 2 times a day (30 Nov 2020 15:21)  QUEtiapine 25 mg oral tablet: 1 tab(s) orally once a day (at bedtime) (30 Nov 2020 15:21)      PAST MEDICAL & SURGICAL HISTORY:  CVA (cerebral vascular accident)  CHF (congestive heart failure)  H/O colectomy  Aorta disorder  CABG  AVR/MVR      Review of Systems:   CONSTITUTIONAL: No fever, weight loss, or fatigue  EYES: No eye pain, visual disturbances, or discharge  ENMT:  No difficulty hearing, tinnitus, vertigo; No sinus or throat pain  NECK: No pain or stiffness  BREASTS: No pain, masses, or nipple discharge  RESPIRATORY: No cough, wheezing, chills or hemoptysis; No shortness of breath  CARDIOVASCULAR: No chest pain, palpitations, dizziness, or leg swelling  GASTROINTESTINAL: No abdominal or epigastric pain. No nausea, vomiting, or hematemesis; No diarrhea or constipation. No melena or hematochezia.  GENITOURINARY: No dysuria, frequency, hematuria, or incontinence  NEUROLOGICAL: No headaches, memory loss, loss of strength, numbness, or tremors  SKIN: No itching, burning, rashes, or lesions   LYMPH NODES: No enlarged glands  ENDOCRINE: No heat or cold intolerance; No hair loss  MUSCULOSKELETAL: No joint pain or swelling; No muscle, back, or extremity pain  PSYCHIATRIC: No depression, anxiety, mood swings, or difficulty sleeping  HEME/LYMPH: No easy bruising, or bleeding gums  ALLERY AND IMMUNOLOGIC: No hives or eczema    Allergies  penicillin (Unknown)  Intolerances    Social History:   Lives home alone. Has family in close proximity  Former smoker, EtOH, illicit drug use. Quit 2010    FAMILY HISTORY:  FH: hypertension    MEDICATIONS  (STANDING):  aMIOdarone    Tablet 200 milliGRAM(s) Oral daily  ascorbic acid 500 milliGRAM(s) Oral daily  aspirin enteric coated 81 milliGRAM(s) Oral daily  atorvastatin 40 milliGRAM(s) Oral at bedtime  epoetin yolanda-epbx (RETACRIT) Injectable 87631 Unit(s) IV Push <User Schedule>  folic acid 1 milliGRAM(s) Oral daily  gabapentin 100 milliGRAM(s) Oral at bedtime  influenza   Vaccine 0.5 milliLiter(s) IntraMuscular once  levETIRAcetam 750 milliGRAM(s) Oral two times a day  melatonin 6 milliGRAM(s) Oral at bedtime  methylphenidate 5 milliGRAM(s) Oral <User Schedule>  metoprolol tartrate 50 milliGRAM(s) Oral every 8 hours  multivitamin 1 Tablet(s) Oral daily  nystatin Cream 1 Application(s) Topical two times a day  pantoprazole    Tablet 40 milliGRAM(s) Oral two times a day  QUEtiapine 25 milliGRAM(s) Oral at bedtime    MEDICATIONS  (PRN):  acetaminophen   Tablet .. 650 milliGRAM(s) Oral every 6 hours PRN Temp greater or equal to 38C (100.4F), Mild Pain (1 - 3)  ALBUTerol    90 MICROgram(s) HFA Inhaler 2 Puff(s) Inhalation every 6 hours PRN Shortness of Breath and/or Wheezing  benzocaine 15 mG/menthol 3.6 mG (Sugar-Free) Lozenge 1 Lozenge Oral two times a day PRN Sore Throat  guaiFENesin   Syrup  (Sugar-Free) 200 milliGRAM(s) Oral every 6 hours PRN Cough      Vital Signs Last 24 Hrs  T(C): 36.4 (01 Dec 2020 07:42), Max: 36.9 (30 Nov 2020 15:23)  T(F): 97.6 (01 Dec 2020 07:42), Max: 98.4 (30 Nov 2020 15:23)  HR: 89 (01 Dec 2020 07:42) (87 - 99)  BP: 118/73 (01 Dec 2020 07:42) (108/63 - 136/82)  BP(mean): --  RR: 16 (01 Dec 2020 07:42) (16 - 18)  SpO2: 97% (01 Dec 2020 07:42) (95% - 100%)  CAPILLARY BLOOD GLUCOSE      PHYSICAL EXAM:  GENERAL: NAD, well-developed  HEAD:  Atraumatic, Normocephalic  EYES: EOMI, PERRLA, conjunctiva and sclera clear  NECK: Supple, No JVD  CHEST/LUNG: Clear to auscultation bilaterally; No wheeze  HEART: Regular rate and rhythm; No murmurs, rubs, or gallops  ABDOMEN: Soft, Nontender, Nondistended; Bowel sounds present  EXTREMITIES:  2+ Peripheral Pulses, No clubbing, cyanosis. 2+ pitting edema of b/l LE   PSYCH: AAOx3  NEUROLOGY: non-focal  SKIN: Sternotomy site c/d/i. LLE Incision site c/d/i with staples. Right chest permacath    LABS:                        8.0    12.66 )-----------( 181      ( 01 Dec 2020 05:50 )             26.4     12-01    140  |  102  |  52<H>  ----------------------------<  83  4.7   |  28  |  3.94<H>    Ca    8.2<L>      01 Dec 2020 05:50  Phos  2.6     11-30  Mg     2.0     11-30    TPro  6.1  /  Alb  2.0<L>  /  TBili  0.9  /  DBili  x   /  AST  30  /  ALT  26  /  AlkPhos  108  12-01    PT/INR - ( 30 Nov 2020 06:54 )   PT: 28.0 sec;   INR: 2.52 ratio                     RADIOLOGY & ADDITIONAL TESTS:    Imaging Personally Reviewed:    Consultant(s) Notes Reviewed:      Care Discussed with Consultants/Other Providers:

## 2020-12-01 NOTE — CHART NOTE - NSCHARTNOTEFT_GEN_A_CORE
Upon Nutritional Assessment by the Registered Dietitian Your Patient was Determined to Meet criteria/has Evidence of the Following Diagnosis:          [X]  Acute Moderate Protein-Calorie Malnutrition    Findings as based on:  [X] Comprehensive Nutrition Assessment   [X] Nutrition Focused Physical Exam - Temporalis, Orbital Fat Pads, Buccal Fat Pads, Bicep & TricepWasting/Depletion Observed  [X] Other: Poor PO Intake x14 Days+     Nutrition Plan/Recommendations:    1) Continues on Nutrition Supplement   2) Education Provided on Need for Supplementation     PROVIDER Section:   By Signing This Assessment You Are Acknowledging & Agree with The Diagnosis Assigned by the Registered Dietitian    Yoni Tracy RDN

## 2020-12-01 NOTE — CONSULT NOTE ADULT - SUBJECTIVE AND OBJECTIVE BOX
NEPHROLOGY CONSULTATION    CHIEF COMPLAINT:    HPI:  Pt is a 61 yo M with PMHx of CAD s/p PCI to LAD, aortic regurgitation, HTN, thoracic aortic dissection s/p emergent surgery c/b CVA with residual left-sided weakness, colostomy for bowel ischemia s/p reversal, and psoriasis on Humira, who presented to [facility] on 11/02/2020 with worsening SOB with associated chest pressure and LE edema. In the ED, had elevated pro-BNP >33k, SCr, acute anemia, and HTN emergency. Evaluated on admission by cardiology; symptoms improved following IV Lasix. Echocardiogram on admission showing EF 50-55%, enlarged LA, mod-sever MR, mod TR, mod-severe AR, and enlarged aortic root.     Patient evaluated by CT surgery for potential AVR/MVR; required CVVHD medical optimization. GI consulted for acute anemia; patient underwent EGD on 11/09 with no significant findings. Patient underwent sternotomy for CABG x2, AVR, and MVR on 11/13; post-op c/b acute blood loss anemia requiring multiple blood products, pressors, and inotropes. Patient was extubated on 11/16 and underwent permacath placement with IR on 11/20. Pressors and inotropes weaned off and transferred to floors on 11/28. Repeat TTE with doppler showing EF 40% with mod LV systolic dysfunction.    Patient was evaluated by PM&R and therapy for functional deficits and gait/ ADL impairments and recommended acute rehabilitation. Patient was medically optimized for discharge to [facility] on 11/30/2020.    Patient was seen and examined at the bedside on arrival. Denied HA, fever, chills, coughing, wheezing, SOB, or abdominal pain. Last BM was this morning. Appetite has returned, requesting dinner. Endorses some weakness and numbness on the left LE 2/2 residual deficits from prior stroke. Had some low back pain prior to arrival, but now resolved.  (30 Nov 2020 15:16)      ROS:  10  Allergies    penicillin (Unknown)    Intolerances        PAST MEDICAL & SURGICAL HISTORY:  CVA (cerebral vascular accident)    CHF (congestive heart failure)    H/O colectomy    Aorta disorder        SOCIAL HISTORY:      FAMILY HISTORY:  FH: hypertension        MEDICATIONS  (STANDING):  aMIOdarone    Tablet 200 milliGRAM(s) Oral daily  ascorbic acid 500 milliGRAM(s) Oral daily  aspirin enteric coated 81 milliGRAM(s) Oral daily  atorvastatin 40 milliGRAM(s) Oral at bedtime  epoetin yolanda-epbx (RETACRIT) Injectable 36458 Unit(s) IV Push <User Schedule>  folic acid 1 milliGRAM(s) Oral daily  gabapentin 100 milliGRAM(s) Oral at bedtime  influenza   Vaccine 0.5 milliLiter(s) IntraMuscular once  levETIRAcetam 750 milliGRAM(s) Oral two times a day  melatonin 6 milliGRAM(s) Oral at bedtime  methylphenidate 5 milliGRAM(s) Oral <User Schedule>  metoprolol tartrate 50 milliGRAM(s) Oral every 8 hours  multivitamin 1 Tablet(s) Oral daily  nystatin Cream 1 Application(s) Topical two times a day  pantoprazole    Tablet 40 milliGRAM(s) Oral two times a day  QUEtiapine 25 milliGRAM(s) Oral at bedtime  warfarin 2 milliGRAM(s) Oral once      PHYSICAL EXAMINATION:  T(F): 97.6 (12-01-20 @ 07:42)  HR: 90 (12-01-20 @ 15:17)  BP: 114/72 (12-01-20 @ 15:17)  RR: 16 (12-01-20 @ 07:42)  SpO2: 97% (12-01-20 @ 07:42)  I&O's Summary    30 Nov 2020 07:01  -  01 Dec 2020 07:00  --------------------------------------------------------  IN: 180 mL / OUT: 150 mL / NET: 30 mL        Conversant, no apparent distress  PERRLA, pink conjunctivae, no ptosis  Good dentition, no pharyngeal erythema  Neck non tender, no mass, no thyromegaly or nodules  Normal respiratory effort, lungs clear to auscultation  Heart with RRR, no murmurs or rubs, no peripheral edema  Abdomen soft, no masses, no organomegaly  Skin no rashes, ulcers or lesions, normal turgor and temperature  Appropriate affect, AO x 3    LABS:                        8.0    12.66 )-----------( 181      ( 01 Dec 2020 05:50 )             26.4     12-01    140  |  102  |  52<H>  ----------------------------<  83  4.7   |  28  |  3.94<H>    Ca    8.2<L>      01 Dec 2020 05:50  Phos  2.6     11-30  Mg     2.0     11-30    TPro  6.1  /  Alb  2.0<L>  /  TBili  0.9  /  DBili  x   /  AST  30  /  ALT  26  /  AlkPhos  108  12-01      LIVER FUNCTIONS - ( 01 Dec 2020 05:50 )  Alb: 2.0 g/dL / Pro: 6.1 g/dL / ALK PHOS: 108 U/L / ALT: 26 U/L / AST: 30 U/L / GGT: x             PT/INR - ( 01 Dec 2020 10:38 )   PT: 20.2 sec;   INR: 1.71 ratio                   A/P:          This consultation involves high risk medical decision making as evidenced by a severe exacerbation of chronic illness, illness with threat to life or bodily function, decision for DNR or to de-escalate care, or use of drugs that require intensive monitoring for toxicity.           NEPHROLOGY CONSULTATION    CHIEF COMPLAINT: OHS    HPI:  Pt is a 61 yo M with PMHx of CAD s/p PCI to LAD, aortic regurgitation, HTN, thoracic aortic dissection s/p emergent surgery c/b CVA with residual left-sided weakness, colostomy for bowel ischemia s/p reversal, and psoriasis on Humira, who presented to [facility] on 11/02/2020 with worsening SOB with associated chest pressure and LE edema. In the ED, had elevated pro-BNP > 33k, Cr, anemia, and HTN emergency. Evaluated on admission by cardiology; symptoms improved following IV Lasix. Echocardiogram on admission showing EF 50-55%, enlarged LA, mod-sever MR, mod TR, mod-severe AR, and enlarged aortic root. Patient evaluated by CT surgery for potential AVR/MVR; required CVVHD medical optimization. GI consulted for acute anemia; patient underwent EGD on 11/09 with no significant findings. Patient underwent sternotomy for CABG x2, AVR, and MVR on 11/13; post-op c/b acute blood loss anemia requiring multiple blood products, pressors, and inotropes. Patient was extubated on 11/16 and underwent perm cath placement with IR on 11/20. Pressors and inotropes weaned off and transferred to floors on 11/28. Repeat TTE with doppler showing EF 40% with mod LV systolic dysfunction. Adm to AR 11/30. Due for HD 12/2. No HA, fever, chills, coughing, wheezing, CP, SOB, N/V/D/C or abdominal pain.     ROS:  as above    Allergies:  penicillin (Unknown)    PAST MEDICAL & SURGICAL HISTORY: as above  CVA (cerebral vascular accident)  CHF (congestive heart failure)  H/O colectomy  Aorta disorder    SOCIAL HISTORY:  negative    FAMILY HISTORY:  FH: hypertension    MEDICATIONS  (STANDING):  aMIOdarone    Tablet 200 milliGRAM(s) Oral daily  ascorbic acid 500 milliGRAM(s) Oral daily  aspirin enteric coated 81 milliGRAM(s) Oral daily  atorvastatin 40 milliGRAM(s) Oral at bedtime  epoetin yolanda-epbx (RETACRIT) Injectable 79622 Unit(s) IV Push <User Schedule>  folic acid 1 milliGRAM(s) Oral daily  gabapentin 100 milliGRAM(s) Oral at bedtime  influenza   Vaccine 0.5 milliLiter(s) IntraMuscular once  levETIRAcetam 750 milliGRAM(s) Oral two times a day  melatonin 6 milliGRAM(s) Oral at bedtime  methylphenidate 5 milliGRAM(s) Oral <User Schedule>  metoprolol tartrate 50 milliGRAM(s) Oral every 8 hours  multivitamin 1 Tablet(s) Oral daily  nystatin Cream 1 Application(s) Topical two times a day  pantoprazole    Tablet 40 milliGRAM(s) Oral two times a day  QUEtiapine 25 milliGRAM(s) Oral at bedtime  warfarin 2 milliGRAM(s) Oral once    Vital Signs Last 24 Hrs  T(C): 36.4 (12-01-20 @ 07:42), Max: 36.9 (11-30-20 @ 21:02)  T(F): 97.6 (12-01-20 @ 07:42), Max: 98.4 (11-30-20 @ 21:02)  HR: 90 (12-01-20 @ 15:17) (88 - 92)  BP: 114/72 (12-01-20 @ 15:17) (113/69 - 136/82)  RR: 16 (12-01-20 @ 07:42) (16 - 16)  SpO2: 97% (12-01-20 @ 07:42) (95% - 97%)    s1s2  b/l air entry  soft  sm edema  AO    LABS:                        8.0    12.66 )-----------( 181      ( 01 Dec 2020 05:50 )             26.4     12-01    140  |  102  |  52<H>  ----------------------------<  83  4.7   |  28  |  3.94<H>    Ca    8.2<L>      01 Dec 2020 05:50  Phos  2.6     11-30  Mg     2.0     11-30    TPro  6.1  /  Alb  2.0<L>  /  TBili  0.9  /  DBili  x   /  AST  30  /  ALT  26  /  AlkPhos  108  12-01    LIVER FUNCTIONS - ( 01 Dec 2020 05:50 )  Alb: 2.0 g/dL / Pro: 6.1 g/dL / ALK PHOS: 108 U/L / ALT: 26 U/L / AST: 30 U/L / GGT: x           PT/INR - ( 01 Dec 2020 10:38 )   PT: 20.2 sec;   INR: 1.71 ratio      A/P:    S/p CABG x2, AVR, and MVR on 11/13  Complicated hospital course as per HPI  No ESRD on HD MWF  HD tomorrow  Renal diet  TMP as able  Epogen w/HD 3 x week  Rehab    [phone]

## 2020-12-02 LAB
ANION GAP SERPL CALC-SCNC: 10 MMOL/L — SIGNIFICANT CHANGE UP (ref 5–17)
BUN SERPL-MCNC: 69 MG/DL — HIGH (ref 7–23)
CALCIUM SERPL-MCNC: 8.1 MG/DL — LOW (ref 8.4–10.5)
CHLORIDE SERPL-SCNC: 101 MMOL/L — SIGNIFICANT CHANGE UP (ref 96–108)
CO2 SERPL-SCNC: 27 MMOL/L — SIGNIFICANT CHANGE UP (ref 22–31)
CREAT SERPL-MCNC: 4.51 MG/DL — HIGH (ref 0.5–1.3)
GLUCOSE SERPL-MCNC: 107 MG/DL — HIGH (ref 70–99)
HCT VFR BLD CALC: 24.6 % — LOW (ref 39–50)
HGB BLD-MCNC: 7.8 G/DL — LOW (ref 13–17)
INR BLD: 1.76 RATIO — HIGH (ref 0.88–1.16)
MCHC RBC-ENTMCNC: 30.6 PG — SIGNIFICANT CHANGE UP (ref 27–34)
MCHC RBC-ENTMCNC: 31.7 GM/DL — LOW (ref 32–36)
MCV RBC AUTO: 96.5 FL — SIGNIFICANT CHANGE UP (ref 80–100)
NRBC # BLD: 0 /100 WBCS — SIGNIFICANT CHANGE UP (ref 0–0)
PHOSPHATE SERPL-MCNC: 2.7 MG/DL — SIGNIFICANT CHANGE UP (ref 2.5–4.5)
PLATELET # BLD AUTO: 198 K/UL — SIGNIFICANT CHANGE UP (ref 150–400)
POTASSIUM SERPL-MCNC: 4.3 MMOL/L — SIGNIFICANT CHANGE UP (ref 3.5–5.3)
POTASSIUM SERPL-SCNC: 4.3 MMOL/L — SIGNIFICANT CHANGE UP (ref 3.5–5.3)
PROTHROM AB SERPL-ACNC: 20.7 SEC — HIGH (ref 10.6–13.6)
RBC # BLD: 2.55 M/UL — LOW (ref 4.2–5.8)
RBC # FLD: 15.9 % — HIGH (ref 10.3–14.5)
SODIUM SERPL-SCNC: 138 MMOL/L — SIGNIFICANT CHANGE UP (ref 135–145)
WBC # BLD: 12.77 K/UL — HIGH (ref 3.8–10.5)
WBC # FLD AUTO: 12.77 K/UL — HIGH (ref 3.8–10.5)

## 2020-12-02 PROCEDURE — 99232 SBSQ HOSP IP/OBS MODERATE 35: CPT

## 2020-12-02 RX ORDER — WARFARIN SODIUM 2.5 MG/1
2 TABLET ORAL ONCE
Refills: 0 | Status: COMPLETED | OUTPATIENT
Start: 2020-12-02 | End: 2020-12-02

## 2020-12-02 RX ADMIN — ATORVASTATIN CALCIUM 40 MILLIGRAM(S): 80 TABLET, FILM COATED ORAL at 22:17

## 2020-12-02 RX ADMIN — AMIODARONE HYDROCHLORIDE 200 MILLIGRAM(S): 400 TABLET ORAL at 06:10

## 2020-12-02 RX ADMIN — WARFARIN SODIUM 2 MILLIGRAM(S): 2.5 TABLET ORAL at 22:17

## 2020-12-02 RX ADMIN — Medication 1 TABLET(S): at 11:57

## 2020-12-02 RX ADMIN — Medication 500 MILLIGRAM(S): at 11:57

## 2020-12-02 RX ADMIN — NYSTATIN CREAM 1 APPLICATION(S): 100000 CREAM TOPICAL at 18:02

## 2020-12-02 RX ADMIN — Medication 6 MILLIGRAM(S): at 22:17

## 2020-12-02 RX ADMIN — Medication 5 MILLIGRAM(S): at 12:00

## 2020-12-02 RX ADMIN — LEVETIRACETAM 750 MILLIGRAM(S): 250 TABLET, FILM COATED ORAL at 18:00

## 2020-12-02 RX ADMIN — Medication 5 MILLIGRAM(S): at 06:15

## 2020-12-02 RX ADMIN — Medication 1 MILLIGRAM(S): at 11:57

## 2020-12-02 RX ADMIN — Medication 25 MILLIGRAM(S): at 06:11

## 2020-12-02 RX ADMIN — Medication 200 MILLIGRAM(S): at 22:16

## 2020-12-02 RX ADMIN — LEVETIRACETAM 750 MILLIGRAM(S): 250 TABLET, FILM COATED ORAL at 06:10

## 2020-12-02 RX ADMIN — ERYTHROPOIETIN 10000 UNIT(S): 10000 INJECTION, SOLUTION INTRAVENOUS; SUBCUTANEOUS at 15:04

## 2020-12-02 RX ADMIN — NYSTATIN CREAM 1 APPLICATION(S): 100000 CREAM TOPICAL at 06:15

## 2020-12-02 RX ADMIN — QUETIAPINE FUMARATE 25 MILLIGRAM(S): 200 TABLET, FILM COATED ORAL at 22:17

## 2020-12-02 RX ADMIN — PANTOPRAZOLE SODIUM 40 MILLIGRAM(S): 20 TABLET, DELAYED RELEASE ORAL at 06:10

## 2020-12-02 RX ADMIN — Medication 81 MILLIGRAM(S): at 11:57

## 2020-12-02 RX ADMIN — Medication 25 MILLIGRAM(S): at 18:00

## 2020-12-02 RX ADMIN — GABAPENTIN 100 MILLIGRAM(S): 400 CAPSULE ORAL at 22:17

## 2020-12-02 RX ADMIN — PANTOPRAZOLE SODIUM 40 MILLIGRAM(S): 20 TABLET, DELAYED RELEASE ORAL at 18:00

## 2020-12-02 NOTE — PROGRESS NOTE ADULT - SUBJECTIVE AND OBJECTIVE BOX
No distress    Vital Signs Last 24 Hrs  T(C): 36.6 (12-02-20 @ 17:40), Max: 37 (12-02-20 @ 14:40)  T(F): 97.9 (12-02-20 @ 17:40), Max: 98.6 (12-02-20 @ 14:40)  HR: 97 (12-02-20 @ 17:40) (66 - 97)  BP: 131/80 (12-02-20 @ 17:40) (98/67 - 131/80)  RR: 20 (12-02-20 @ 17:40) (15 - 20)  SpO2: 97% (12-02-20 @ 17:40) (94% - 100%)    s1s2  b/l air entry  soft  sm edema                        7.8    12.77 )-----------( 198      ( 02 Dec 2020 15:00 )             24.6     02 Dec 2020 15:00    138    |  101    |  69     ----------------------------<  107    4.3     |  27     |  4.51     Ca    8.1        02 Dec 2020 15:00  Phos  2.7       02 Dec 2020 15:00    TPro  6.1    /  Alb  2.0    /  TBili  0.9    /  DBili  x      /  AST  30     /  ALT  26     /  AlkPhos  108    01 Dec 2020 05:50    LIVER FUNCTIONS - ( 01 Dec 2020 05:50 )  Alb: 2.0 g/dL / Pro: 6.1 g/dL / ALK PHOS: 108 U/L / ALT: 26 U/L / AST: 30 U/L / GGT: x           PT/INR - ( 02 Dec 2020 05:30 )   PT: 20.7 sec;   INR: 1.76 ratio      acetaminophen   Tablet .. 650 milliGRAM(s) Oral every 6 hours PRN  ALBUTerol    90 MICROgram(s) HFA Inhaler 2 Puff(s) Inhalation every 6 hours PRN  aMIOdarone    Tablet 200 milliGRAM(s) Oral daily  ascorbic acid 500 milliGRAM(s) Oral daily  aspirin enteric coated 81 milliGRAM(s) Oral daily  atorvastatin 40 milliGRAM(s) Oral at bedtime  benzocaine 15 mG/menthol 3.6 mG (Sugar-Free) Lozenge 1 Lozenge Oral two times a day PRN  epoetin yolanda-epbx (RETACRIT) Injectable 09228 Unit(s) IV Push <User Schedule>  folic acid 1 milliGRAM(s) Oral daily  gabapentin 100 milliGRAM(s) Oral at bedtime  guaiFENesin   Syrup  (Sugar-Free) 200 milliGRAM(s) Oral every 6 hours PRN  influenza   Vaccine 0.5 milliLiter(s) IntraMuscular once  levETIRAcetam 750 milliGRAM(s) Oral two times a day  melatonin 6 milliGRAM(s) Oral at bedtime  methylphenidate 5 milliGRAM(s) Oral <User Schedule>  metoprolol tartrate 25 milliGRAM(s) Oral every 12 hours  multivitamin 1 Tablet(s) Oral daily  nystatin Cream 1 Application(s) Topical two times a day  pantoprazole    Tablet 40 milliGRAM(s) Oral two times a day  QUEtiapine 25 milliGRAM(s) Oral at bedtime  warfarin 2 milliGRAM(s) Oral once    A/P:    S/p CABG x 2, AVR, and MVR on 11/13  Complicated hospital course as per HPI  Now ESRD on HD MWF  Renal diet  TMP as able w/HD  Epogen w/HD 3 x week  Rehab    [phone]

## 2020-12-02 NOTE — PROGRESS NOTE ADULT - SUBJECTIVE AND OBJECTIVE BOX
HPI:  JONATHAN GIBSON is a 59yo M with PMHx of CAD s/p PCI to LAD, aortic regurgitation, HTN, thoracic aortic dissection s/p emergent surgery c/b CVA with residual left-sided weakness, colostomy for bowel ischemia s/p reversal, and psoriasis on Humira, who presented to [facility] on 2020 with worsening SOB with associated chest pressure and LE edema. In the ED, had elevated pro-BNP >33k, SCr, acute anemia, and HTN emergency. Evaluated on admission by cardiology; symptoms improved following IV Lasix. Echocardiogram on admission showing EF 50-55%, enlarged LA, mod-sever MR, mod TR, mod-severe AR, and enlarged aortic root.   Patient evaluated by CT surgery for potential AVR/MVR; required CVVHD medical optimization. GI consulted for acute anemia; patient underwent EGD on  with no significant findings. Patient underwent sternotomy for CABG x2, AVR, and MVR on ; post-op c/b acute blood loss anemia requiring multiple blood products, pressors, and inotropes. Patient was extubated on  and underwent permacath placement with IR on . Pressors and inotropes weaned off and transferred to floors on . Repeat TTE with doppler showing EF 40% with mod LV systolic dysfunction.  Patient was evaluated by PM&R and therapy for functional deficits and gait/ ADL impairments and recommended acute rehabilitation. Patient was medically optimized for discharge to [facility] on 2020.    TODAY'S SUBJECTIVE & REVIEW OF SYMPTOMS  Patient seen at bedside  slept well  reports pain in leg at area of incisions    Denied HA, fever, chills, coughing, wheezing, SOB, or abdominal pain.     PHYSICAL EXAM  Vital Signs Last 24 Hrs  T(C): 36.7 (02 Dec 2020 08:44), Max: 36.7 (02 Dec 2020 08:44)  T(F): 98 (02 Dec 2020 08:44), Max: 98 (02 Dec 2020 08:44)  HR: 91 (02 Dec 2020 11:00) (87 - 96)  BP: 127/89 (02 Dec 2020 11:00) (98/67 - 127/89)  BP(mean): --  RR: 15 (02 Dec 2020 11:00) (15 - 16)  SpO2: 96% (02 Dec 2020 11:00) (94% - 96%)      Daily Weight in k.2 (02 Dec 2020 07:19)  Daily Weight in k.9 (01 Dec 2020 05:58)    Constitutional - NAD, Comfortable  Chest - CTAB  Cardiovascular - RRR  Abdomen - BS+, Soft, NTND  Extremities -LE edema + -bilateral    Neurologic Exam -                    Cognitive - Awake, Alert, AAO to self, place, date, year, situation     No facial weakness     Motor left sided weakness from prior stroke      Psychiatric - Mood stable, Affect WNL  Skin:  Sternal incision from sternotomy, no staples in place; appears c/d/i. Left LE incision from vein harvesting, staples in place mild erythema surround incision; appears c/d/i. Right inner thigh incision, as well with staples in place; appears c/d/i. Stage 1 sacral lesion  Lines: Right IJ Permacath on the right chest wall. Midline in right medial arm    MEDICATIONS  (STANDING):  aMIOdarone    Tablet 200 milliGRAM(s) Oral daily  ascorbic acid 500 milliGRAM(s) Oral daily  aspirin enteric coated 81 milliGRAM(s) Oral daily  atorvastatin 40 milliGRAM(s) Oral at bedtime  epoetin yolanda-epbx (RETACRIT) Injectable 45254 Unit(s) IV Push <User Schedule>  folic acid 1 milliGRAM(s) Oral daily  gabapentin 100 milliGRAM(s) Oral at bedtime  influenza   Vaccine 0.5 milliLiter(s) IntraMuscular once  levETIRAcetam 750 milliGRAM(s) Oral two times a day  melatonin 6 milliGRAM(s) Oral at bedtime  methylphenidate 5 milliGRAM(s) Oral <User Schedule>  metoprolol tartrate 25 milliGRAM(s) Oral every 12 hours  multivitamin 1 Tablet(s) Oral daily  nystatin Cream 1 Application(s) Topical two times a day  pantoprazole    Tablet 40 milliGRAM(s) Oral two times a day  QUEtiapine 25 milliGRAM(s) Oral at bedtime    MEDICATIONS  (PRN):  acetaminophen   Tablet .. 650 milliGRAM(s) Oral every 6 hours PRN Temp greater or equal to 38C (100.4F), Mild Pain (1 - 3)  ALBUTerol    90 MICROgram(s) HFA Inhaler 2 Puff(s) Inhalation every 6 hours PRN Shortness of Breath and/or Wheezing  benzocaine 15 mG/menthol 3.6 mG (Sugar-Free) Lozenge 1 Lozenge Oral two times a day PRN Sore Throat  guaiFENesin   Syrup  (Sugar-Free) 200 milliGRAM(s) Oral every 6 hours PRN Cough      CAPILLARY BLOOD GLUCOSE        RECENT LABS/IMAGING                        8.0    12.66 )-----------( 181      ( 01 Dec 2020 05:50 )             26.4     12-    140  |  102  |  52<H>  ----------------------------<  83  4.7   |  28  |  3.94<H>    Ca    8.2<L>      01 Dec 2020 05:50  Phos  2.6     11-30  Mg     2.0     11-30    TPro  6.1  /  Alb  2.0<L>  /  TBili  0.9  /  DBili  x   /  AST  30  /  ALT  26  /  AlkPhos  108  12-01    PT/INR - ( 02 Dec 2020 05:30 )   PT: 20.7 sec;   INR: 1.76 ratio    PT/INR - ( 01 Dec 2020 10:38 )   PT: 20.2 sec;   INR: 1.71 ratio

## 2020-12-02 NOTE — PROGRESS NOTE ADULT - SUBJECTIVE AND OBJECTIVE BOX
Patient is a 60y old  Male who presents with a chief complaint of Impairments: AVR  Impairment Codes: 09 Cardiac (01 Dec 2020 17:57)      SUBJECTIVE / OVERNIGHT EVENTS:  Pt seen and examined at bedside. No acute events overnight.  Pt denies cp, palpitations, sob, abd pain, N/V, fever, chills.    ROS:  All other review of systems negative    Allergies    penicillin (Unknown)    Intolerances        MEDICATIONS  (STANDING):  aMIOdarone    Tablet 200 milliGRAM(s) Oral daily  ascorbic acid 500 milliGRAM(s) Oral daily  aspirin enteric coated 81 milliGRAM(s) Oral daily  atorvastatin 40 milliGRAM(s) Oral at bedtime  epoetin yolanda-epbx (RETACRIT) Injectable 19838 Unit(s) IV Push <User Schedule>  folic acid 1 milliGRAM(s) Oral daily  gabapentin 100 milliGRAM(s) Oral at bedtime  influenza   Vaccine 0.5 milliLiter(s) IntraMuscular once  levETIRAcetam 750 milliGRAM(s) Oral two times a day  melatonin 6 milliGRAM(s) Oral at bedtime  methylphenidate 5 milliGRAM(s) Oral <User Schedule>  metoprolol tartrate 25 milliGRAM(s) Oral every 12 hours  multivitamin 1 Tablet(s) Oral daily  nystatin Cream 1 Application(s) Topical two times a day  pantoprazole    Tablet 40 milliGRAM(s) Oral two times a day  QUEtiapine 25 milliGRAM(s) Oral at bedtime    MEDICATIONS  (PRN):  acetaminophen   Tablet .. 650 milliGRAM(s) Oral every 6 hours PRN Temp greater or equal to 38C (100.4F), Mild Pain (1 - 3)  ALBUTerol    90 MICROgram(s) HFA Inhaler 2 Puff(s) Inhalation every 6 hours PRN Shortness of Breath and/or Wheezing  benzocaine 15 mG/menthol 3.6 mG (Sugar-Free) Lozenge 1 Lozenge Oral two times a day PRN Sore Throat  guaiFENesin   Syrup  (Sugar-Free) 200 milliGRAM(s) Oral every 6 hours PRN Cough      Vital Signs Last 24 Hrs  T(C): 36.7 (02 Dec 2020 08:44), Max: 36.7 (02 Dec 2020 08:44)  T(F): 98 (02 Dec 2020 08:44), Max: 98 (02 Dec 2020 08:44)  HR: 96 (02 Dec 2020 08:44) (87 - 96)  BP: 119/82 (02 Dec 2020 08:44) (98/67 - 121/74)  BP(mean): --  RR: 15 (02 Dec 2020 08:44) (15 - 16)  SpO2: 96% (02 Dec 2020 08:44) (94% - 96%)  CAPILLARY BLOOD GLUCOSE        I&O's Summary      PHYSICAL EXAM:  GENERAL: NAD, well-developed  CHEST/LUNG: Clear to auscultation bilaterally; No wheeze  HEART: Regular rate and rhythm; No murmurs, rubs, or gallops  ABDOMEN: Soft, Nontender, Nondistended; Bowel sounds present  EXTREMITIES:  2+ Peripheral Pulses, No clubbing, cyanosis. 2+ pitting edema of b/l LE   PSYCH: AAOx3  NEUROLOGY: non-focal  SKIN: Sternotomy site c/d/i. LLE Incision site c/d/i with staples. Right chest permacath    LABS:                        8.0    12.66 )-----------( 181      ( 01 Dec 2020 05:50 )             26.4     12-01    140  |  102  |  52<H>  ----------------------------<  83  4.7   |  28  |  3.94<H>    Ca    8.2<L>      01 Dec 2020 05:50    TPro  6.1  /  Alb  2.0<L>  /  TBili  0.9  /  DBili  x   /  AST  30  /  ALT  26  /  AlkPhos  108  12-01    PT/INR - ( 02 Dec 2020 05:30 )   PT: 20.7 sec;   INR: 1.76 ratio                   RADIOLOGY & ADDITIONAL TESTS:  Results Reviewed:   Imaging Personally Reviewed:  Electrocardiogram Personally Reviewed:    COORDINATION OF CARE:  Care Discussed with Consultants/Other Providers [Y/N]:  Prior or Outpatient Records Reviewed [Y/N]:

## 2020-12-02 NOTE — CHART NOTE - NSCHARTNOTEFT_GEN_A_CORE
REHABILITATION DIAGNOSIS/IMPAIRMENT GROUP CODE:  Cardiac- Mitral and Aortic regurgitation    COMORBIDITIES/COMPLICATING CONDITIONS IMPACTING REHABILITATION:  HEALTH ISSUES - PROBLEM Dx:  s/p AVR/MVR  CABGX2  SANTOS- needing HD  Anemia- s/p transfusions  Atrial flutter      PAST MEDICAL & SURGICAL HISTORY:  CVA (cerebral vascular accident)  CHF (congestive heart failure)  H/O colectomy  History of thoracic Aortic  Disection        Based upon consideration of the patient's impairments, functional status, complicating conditions and any other contributing factors and after information garnered from the assessments of all therapy disciplines involved in treating the patient and other pertinent clinicians:    INTERDISCIPLINARY REHABILITATION INTERVENTIONS:    [ x  ] Transfer Training  [ x  ] Bed Mobility  [ x  ] Therapeutic Exercise  [ x  ] Balance/Coordination Exercises  [ x  ] Locomotion retraining  [ x  ] Stairs  [ x  ] Functional Transfer Training  [ x  ] Bowel/Bladder program  [ x  ] Pain Management  [ x  ] Skin/Wound Care  [   ] Visual/Perceptual Training  [   ] Therapeutic Recreation Activities  [ x  ] Neuromuscular Re-education  [ x  ] Activities of Daily Living  [   ] Speech Exercise  [   ] Swallowing Exercises  [   ] Vital Stim  [   ] Dietary Supplements  [   ] Calorie Count  [   ] Cognitive Exercises  [   ] Cognitive/Linguistic Treatment  [   ] Behavior Program  [   ] Neuropsych Therapy  [ x  ] Patient/Family Counseling  [ x  ] Family Training  [   ] Community Re-entry  [   ] Orthotic Evaluation  [   ] Prosthetic Eval/Training    MEDICAL PROGNOSIS:  fair    REHAB POTENTIAL:  fair  EXPECTED DAILY THERAPY:         PT: 2 hrs/day       OT:1 hr/day       ST:NA       P and O: NA    EXPECTED INTENSITY OF PROGRAM:  3 hrs/day    EXPECTED FREQUENCY OF PROGRAM:  5 days/week    ESTIMATED LOS:  14-16 days    ESTIMATED DISPOSITION:  Home    INTERDISCIPLINARY FUNCTIONAL OUTCOMES/GOALS:         Gait/Mobility: Modified independent       Transfers:Modified independent       ADLs:Modified independent       Functional Transfers:Modified independent       Medication Management:Modified independent       Communication:Modified independent       Cognitive:Modified independent       Dysphagia:na       Bladder:Modified independent       Bowel:Modified independent

## 2020-12-02 NOTE — PROGRESS NOTE ADULT - ASSESSMENT
61yo Male with hx of CAD s/p PCI to LAD, aortic regurgitation, HTN, thoracic aortic dissection s/p emergent surgery c/b CVA with residual left-sided weakness, colostomy for bowel ischemia s/p reversal, and psoriasis on Humira, presented to [facility] for acute rehab from [facility] with complaints of cp a/w SOB and LE swelling, noted to have enlarged LA, mod-sever MR, mod TR, mod-severe AR, and enlarged aortic root. Pt s/p CABG x2, AVR, and MVR on 11/13. Pt was medically optimized with CVVHD. Post op complications with acute blood loss anemia requiring multiple blood products. Pt was stable for discharge to acute rehab.    #Debility  -Comprehensive Multidisciplinary Rehab Program  -Gait, ADL, Functional impairments  -PT/OT/ SLP 3 hours a day 5 days a week    #CAD with AR and MR  #Combined systolic/diastolic CHF  -s/p sternotomy  with CABG x2, AVR, and MVR 11/13  -c/w ASA/Statin/Metoprolol  -Monitor vitals    #Atrial flutter  -c/w Coumadin and Metoprolol  -c/w Amiodarone 200mg daily  -INR Goal 2-3    #ATN  -No indication of CKD from trending renal function. Currently renal failure is acute  -HD MWF  -Nephrology on board  -avoid nephrotoxic agents  -Retacrit with HD    #History of seizures  - c/w Keppra 750mg BID    #DVT prophylaxis:  - Coumadin  - SCDs

## 2020-12-03 LAB
INR BLD: 1.7 RATIO — HIGH (ref 0.88–1.16)
PROTHROM AB SERPL-ACNC: 20.1 SEC — HIGH (ref 10.6–13.6)

## 2020-12-03 PROCEDURE — 99232 SBSQ HOSP IP/OBS MODERATE 35: CPT

## 2020-12-03 PROCEDURE — 99232 SBSQ HOSP IP/OBS MODERATE 35: CPT | Mod: GC

## 2020-12-03 RX ORDER — WARFARIN SODIUM 2.5 MG/1
3 TABLET ORAL ONCE
Refills: 0 | Status: DISCONTINUED | OUTPATIENT
Start: 2020-12-03 | End: 2020-12-03

## 2020-12-03 RX ORDER — WARFARIN SODIUM 2.5 MG/1
5 TABLET ORAL ONCE
Refills: 0 | Status: COMPLETED | OUTPATIENT
Start: 2020-12-03 | End: 2020-12-03

## 2020-12-03 RX ORDER — METHYLPHENIDATE HCL 5 MG
5 TABLET ORAL
Refills: 0 | Status: DISCONTINUED | OUTPATIENT
Start: 2020-12-08 | End: 2020-12-11

## 2020-12-03 RX ORDER — METHYLPHENIDATE HCL 5 MG
5 TABLET ORAL DAILY
Refills: 0 | Status: DISCONTINUED | OUTPATIENT
Start: 2020-12-04 | End: 2020-12-06

## 2020-12-03 RX ADMIN — Medication 500 MILLIGRAM(S): at 12:01

## 2020-12-03 RX ADMIN — WARFARIN SODIUM 5 MILLIGRAM(S): 2.5 TABLET ORAL at 21:32

## 2020-12-03 RX ADMIN — GABAPENTIN 100 MILLIGRAM(S): 400 CAPSULE ORAL at 21:32

## 2020-12-03 RX ADMIN — Medication 25 MILLIGRAM(S): at 05:55

## 2020-12-03 RX ADMIN — PANTOPRAZOLE SODIUM 40 MILLIGRAM(S): 20 TABLET, DELAYED RELEASE ORAL at 05:56

## 2020-12-03 RX ADMIN — LEVETIRACETAM 750 MILLIGRAM(S): 250 TABLET, FILM COATED ORAL at 05:55

## 2020-12-03 RX ADMIN — Medication 1 TABLET(S): at 12:01

## 2020-12-03 RX ADMIN — Medication 1 MILLIGRAM(S): at 12:00

## 2020-12-03 RX ADMIN — LEVETIRACETAM 750 MILLIGRAM(S): 250 TABLET, FILM COATED ORAL at 17:39

## 2020-12-03 RX ADMIN — ATORVASTATIN CALCIUM 40 MILLIGRAM(S): 80 TABLET, FILM COATED ORAL at 21:32

## 2020-12-03 RX ADMIN — Medication 25 MILLIGRAM(S): at 17:39

## 2020-12-03 RX ADMIN — NYSTATIN CREAM 1 APPLICATION(S): 100000 CREAM TOPICAL at 17:40

## 2020-12-03 RX ADMIN — AMIODARONE HYDROCHLORIDE 200 MILLIGRAM(S): 400 TABLET ORAL at 05:56

## 2020-12-03 RX ADMIN — PANTOPRAZOLE SODIUM 40 MILLIGRAM(S): 20 TABLET, DELAYED RELEASE ORAL at 17:39

## 2020-12-03 RX ADMIN — NYSTATIN CREAM 1 APPLICATION(S): 100000 CREAM TOPICAL at 05:56

## 2020-12-03 RX ADMIN — Medication 81 MILLIGRAM(S): at 12:01

## 2020-12-03 RX ADMIN — Medication 5 MILLIGRAM(S): at 12:00

## 2020-12-03 RX ADMIN — Medication 6 MILLIGRAM(S): at 22:09

## 2020-12-03 RX ADMIN — QUETIAPINE FUMARATE 25 MILLIGRAM(S): 200 TABLET, FILM COATED ORAL at 21:32

## 2020-12-03 RX ADMIN — Medication 5 MILLIGRAM(S): at 05:56

## 2020-12-03 RX ADMIN — Medication 200 MILLIGRAM(S): at 21:32

## 2020-12-03 RX ADMIN — ALBUTEROL 2 PUFF(S): 90 AEROSOL, METERED ORAL at 23:11

## 2020-12-03 NOTE — PROGRESS NOTE ADULT - ATTENDING COMMENTS
Rehab Attending- Patient seen and examined with resident. Above note reviewed with modifications made by me    Labs Reviewed  CO numbness 3-5th digits with weakness Right hand since surgery  On exam - weak, finger abduction weak- sensation intact to lt touch  no facial ,no other weakness noted on RUE/RLE- Plantars downgoing  No tinels at elbow  Doubt Central process, ? peripheral Nerve injury  Would continue Rehab  Also INR trending downward on Current dose Coumadin- would give 5mg tonight  Hgb 7.8- would type and screen- ? transfuse unit in hemo If Hgb continues to trend downward  Continue intensive Rehab program

## 2020-12-03 NOTE — PROGRESS NOTE ADULT - ASSESSMENT
61yo Male with hx of CAD s/p PCI to LAD, aortic regurgitation, HTN, thoracic aortic dissection s/p emergent surgery c/b CVA with residual left-sided weakness, colostomy for bowel ischemia s/p reversal, and psoriasis on Humira, presented to [facility] for acute rehab from [facility] with complaints of cp a/w SOB and LE swelling, noted to have enlarged LA, mod-sever MR, mod TR, mod-severe AR, and enlarged aortic root. Pt s/p CABG x2, AVR, and MVR on 11/13. Pt was medically optimized with CVVHD. Post op complications with acute blood loss anemia requiring multiple blood products. Pt was stable for discharge to acute rehab.    #Debility  -Comprehensive Multidisciplinary Rehab Program  -Gait, ADL, Functional impairments  -PT/OT/ SLP 3 hours a day 5 days a week    #CAD with AR and MR  #Combined systolic/diastolic CHF  -s/p sternotomy  with CABG x2, AVR, and MVR 11/13  -c/w ASA/Statin/Metoprolol  -Monitor vitals    #Atrial flutter  -c/w Coumadin and Metoprolol  -c/w Amiodarone 200mg daily  -May want to increase the dose of Coumadin from 2mg as INR declining daily  -INR Goal 2-3    #ATN  -No indication of CKD from trending renal function. Currently renal failure is acute  -HD MWF  -Nephrology on board  -avoid nephrotoxic agents  -Retacrit with HD    #History of seizures  - c/w Keppra 750mg BID    #DVT prophylaxis:  - Coumadin  - SCDs

## 2020-12-03 NOTE — PROGRESS NOTE ADULT - SUBJECTIVE AND OBJECTIVE BOX
DAILY PROGRESS NOTE:  HPI:  JONATHAN GIBSON is a 59yo M with PMHx of CAD s/p PCI to LAD, aortic regurgitation, HTN, thoracic aortic dissection s/p emergent surgery c/b CVA with residual left-sided weakness, colostomy for bowel ischemia s/p reversal, and psoriasis on Humira, who presented to [facility] on 2020 with worsening SOB with associated chest pressure and LE edema. In the ED, had elevated pro-BNP >33k, SCr, acute anemia, and HTN emergency. Evaluated on admission by cardiology; symptoms improved following IV Lasix. Echocardiogram on admission showing EF 50-55%, enlarged LA, mod-sever MR, mod TR, mod-severe AR, and enlarged aortic root.     Patient evaluated by CT surgery for potential AVR/MVR; required CVVHD medical optimization. GI consulted for acute anemia; patient underwent EGD on  with no significant findings. Patient underwent sternotomy for CABG x2, AVR, and MVR on ; post-op c/b acute blood loss anemia requiring multiple blood products, pressors, and inotropes. Patient was extubated on  and underwent permacath placement with IR on . Pressors and inotropes weaned off and transferred to floors on . Repeat TTE with doppler showing EF 40% with mod LV systolic dysfunction.    Patient was evaluated by PM&R and therapy for functional deficits and gait/ ADL impairments and recommended acute rehabilitation. Patient was medically optimized for discharge to [facility] on 2020.    Patient was seen and examined at the bedside on arrival. Denied HA, fever, chills, coughing, wheezing, SOB, or abdominal pain. Last BM was this morning. Appetite has returned, requesting dinner. Endorses some weakness and numbness on the left LE 2/2 residual deficits from prior stroke. Had some low back pain prior to arrival, but now resolved.  (2020 15:16)      Subjective:  Patient was seen and examined at the bedside this AM. No acute overnight events.       Physical Exam:  Vital Signs Last 24 Hrs  T(C): 36.8 (03 Dec 2020 05:53), Max: 37 (02 Dec 2020 14:40)  T(F): 98.3 (03 Dec 2020 05:53), Max: 98.6 (02 Dec 2020 14:40)  HR: 99 (03 Dec 2020 05:53) (66 - 102)  BP: 109/61 (03 Dec 2020 05:53) (109/61 - 131/80)  RR: 18 (03 Dec 2020 05:53) (15 - 20)  SpO2: 97% (03 Dec 2020 05:53) (96% - 100%)    20 @ 07:01  -  20 @ 07:00  --------------------------------------------------------  IN: 0 mL / OUT: 1000 mL / NET: -1000 mL      Constitutional - NAD, Comfortable  Chest - CTAB  Cardiovascular - RRR  Abdomen - BS+, Soft, NTND  Extremities -LE edema + -bilateral    Neurologic Exam -                    Cognitive - Awake, Alert, AAO to self, place, date, year, situation     No facial weakness     Motor left sided weakness from prior stroke      Psychiatric - Mood stable, Affect WNL  Skin:  Sternal incision from sternotomy, no staples in place; appears c/d/i. Left LE incision from vein harvesting, staples in place mild erythema surround incision; appears c/d/i. Right inner thigh incision, as well with staples in place; appears c/d/i. Stage 1 sacral lesion  Lines: Right IJ Permacath on the right chest wall. Midline in right medial arm      Recent Labs/Imagin.8    12.77 )-----------( 198      ( 02 Dec 2020 15:00 )             24.6     12-    138  |  101  |  69<H>  ----------------------------<  107<H>  4.3   |  27  |  4.51<H>    Ca    8.1<L>      02 Dec 2020 15:00  Phos  2.7     12-  PT/INR - ( 03 Dec 2020 05:15 )   PT: 20.1 sec;   INR: 1.70 ratio         Medications:  acetaminophen   Tablet .. 650 milliGRAM(s) Oral every 6 hours PRN  ALBUTerol    90 MICROgram(s) HFA Inhaler 2 Puff(s) Inhalation every 6 hours PRN  aMIOdarone    Tablet 200 milliGRAM(s) Oral daily  ascorbic acid 500 milliGRAM(s) Oral daily  aspirin enteric coated 81 milliGRAM(s) Oral daily  atorvastatin 40 milliGRAM(s) Oral at bedtime  benzocaine 15 mG/menthol 3.6 mG (Sugar-Free) Lozenge 1 Lozenge Oral two times a day PRN  epoetin yolanda-epbx (RETACRIT) Injectable 86338 Unit(s) IV Push <User Schedule>  folic acid 1 milliGRAM(s) Oral daily  gabapentin 100 milliGRAM(s) Oral at bedtime  guaiFENesin   Syrup  (Sugar-Free) 200 milliGRAM(s) Oral every 6 hours PRN  influenza   Vaccine 0.5 milliLiter(s) IntraMuscular once  levETIRAcetam 750 milliGRAM(s) Oral two times a day  melatonin 6 milliGRAM(s) Oral at bedtime  methylphenidate 5 milliGRAM(s) Oral <User Schedule>  metoprolol tartrate 25 milliGRAM(s) Oral every 12 hours  multivitamin 1 Tablet(s) Oral daily  nystatin Cream 1 Application(s) Topical two times a day  pantoprazole    Tablet 40 milliGRAM(s) Oral two times a day  QUEtiapine 25 milliGRAM(s) Oral at bedtime DAILY PROGRESS NOTE:  HPI:  JONATHAN GIBSON is a 61yo M with PMHx of CAD s/p PCI to LAD, aortic regurgitation, HTN, thoracic aortic dissection s/p emergent surgery c/b CVA with residual left-sided weakness, colostomy for bowel ischemia s/p reversal, and psoriasis on Humira, who presented to [facility] on 2020 with worsening SOB with associated chest pressure and LE edema. In the ED, had elevated pro-BNP >33k, SCr, acute anemia, and HTN emergency. Evaluated on admission by cardiology; symptoms improved following IV Lasix. Echocardiogram on admission showing EF 50-55%, enlarged LA, mod-sever MR, mod TR, mod-severe AR, and enlarged aortic root.     Patient evaluated by CT surgery for potential AVR/MVR; required CVVHD medical optimization. GI consulted for acute anemia; patient underwent EGD on  with no significant findings. Patient underwent sternotomy for CABG x2, AVR, and MVR on ; post-op c/b acute blood loss anemia requiring multiple blood products, pressors, and inotropes. Patient was extubated on  and underwent permacath placement with IR on . Pressors and inotropes weaned off and transferred to floors on . Repeat TTE with doppler showing EF 40% with mod LV systolic dysfunction.    Patient was evaluated by PM&R and therapy for functional deficits and gait/ ADL impairments and recommended acute rehabilitation. Patient was medically optimized for discharge to [facility] on 2020.    Patient was seen and examined at the bedside on arrival. Denied HA, fever, chills, coughing, wheezing, SOB, or abdominal pain. Last BM was this morning. Appetite has returned, requesting dinner. Endorses some weakness and numbness on the left LE 2/2 residual deficits from prior stroke. Had some low back pain prior to arrival, but now resolved.  (2020 15:16)      Subjective:  Patient was seen and examined at the bedside this AM. No acute overnight events. C/o right scapular pain this morning, but otherwise no other complaints. However, does note right handed weakness since his operation, especially in the ulnar aspect of right hand. Denied fever, chills, SOB, or abdominal pain. Last BM was yesterday.      Physical Exam:  Vital Signs Last 24 Hrs  T(C): 36.8 (03 Dec 2020 05:53), Max: 37 (02 Dec 2020 14:40)  T(F): 98.3 (03 Dec 2020 05:53), Max: 98.6 (02 Dec 2020 14:40)  HR: 99 (03 Dec 2020 05:53) (66 - 102)  BP: 109/61 (03 Dec 2020 05:53) (109/61 - 131/80)  RR: 18 (03 Dec 2020 05:53) (15 - 20)  SpO2: 97% (03 Dec 2020 05:53) (96% - 100%)    20 @ 07:01  -  20 @ 07:00  --------------------------------------------------------  IN: 0 mL / OUT: 1000 mL / NET: -1000 mL      Constitutional - NAD, Comfortable  Chest - CTAB  Cardiovascular - RRR  Abdomen - BS+, Soft, NTND  Extremities -LE edema + -bilateral. Weakness to abduction in 5th digit on the right hand.  Neurologic Exam -                    Cognitive - Awake, Alert, AAO to self, place, date, year, situation     No facial weakness     Motor left sided weakness from prior stroke      Psychiatric - Mood stable, Affect WNL  Skin:  Sternal incision from sternotomy, no staples in place; appears c/d/i. Left LE incision from vein harvesting, staples in place mild erythema surround incision; appears c/d/i. Right inner thigh incision, as well with staples in place; appears c/d/i. Stage 1 sacral lesion  Lines: Right IJ Permacath on the right chest wall. Midline in right medial arm      Recent Labs/Imagin.8    12.77 )-----------( 198      ( 02 Dec 2020 15:00 )             24.6     12-02    138  |  101  |  69<H>  ----------------------------<  107<H>  4.3   |  27  |  4.51<H>    Ca    8.1<L>      02 Dec 2020 15:00  Phos  2.7     12-02  PT/INR - ( 03 Dec 2020 05:15 )   PT: 20.1 sec;   INR: 1.70 ratio         Medications:  acetaminophen   Tablet .. 650 milliGRAM(s) Oral every 6 hours PRN  ALBUTerol    90 MICROgram(s) HFA Inhaler 2 Puff(s) Inhalation every 6 hours PRN  aMIOdarone    Tablet 200 milliGRAM(s) Oral daily  ascorbic acid 500 milliGRAM(s) Oral daily  aspirin enteric coated 81 milliGRAM(s) Oral daily  atorvastatin 40 milliGRAM(s) Oral at bedtime  benzocaine 15 mG/menthol 3.6 mG (Sugar-Free) Lozenge 1 Lozenge Oral two times a day PRN  epoetin yolanda-epbx (RETACRIT) Injectable 47779 Unit(s) IV Push <User Schedule>  folic acid 1 milliGRAM(s) Oral daily  gabapentin 100 milliGRAM(s) Oral at bedtime  guaiFENesin   Syrup  (Sugar-Free) 200 milliGRAM(s) Oral every 6 hours PRN  influenza   Vaccine 0.5 milliLiter(s) IntraMuscular once  levETIRAcetam 750 milliGRAM(s) Oral two times a day  melatonin 6 milliGRAM(s) Oral at bedtime  methylphenidate 5 milliGRAM(s) Oral <User Schedule>  metoprolol tartrate 25 milliGRAM(s) Oral every 12 hours  multivitamin 1 Tablet(s) Oral daily  nystatin Cream 1 Application(s) Topical two times a day  pantoprazole    Tablet 40 milliGRAM(s) Oral two times a day  QUEtiapine 25 milliGRAM(s) Oral at bedtime

## 2020-12-03 NOTE — PROGRESS NOTE ADULT - ASSESSMENT
59yo M with PMHx of CAD s/p PCI to LAD, aortic regurgitation, HTN, thoracic aortic dissection s/p emergent surgery c/b CVA with residual left-sided weakness, colostomy for bowel ischemia s/p reversal, and psoriasis on Humira, who presented to [facility] on 11/02/2020 with worsening SOB with associated chest pressure and LE edema; found to have mod-severe MR and AR, now s/p sternotomy for CABG x2, AVR, and MVR. Hospital Course complicated by SANTOS now requiring HD, acute blood loss anemia s/p multiple PRBCs, and atrial flutter. Admitted for multidisciplinary rehab program.    #Comprehensive Multidisciplinary Rehab Program:  - PT/OT/ 3 hours a day 5 days a week    #CAD with AR and MR, s/p sternotomy  with CABG x2, AVR, and MVR  - repeat TTE prior to discharge on 11/23 with EF 40%  - Daily INR; c/w Coumadin  - c/w ASA 81mg daily  - c/w atorvastatin 40mg qhs  - c/w Metoprolol tartrate 50mg q8h  on ritalin - will dc     Atrial flutter:- c/w Coumadin and Metoprolol, - c/w Amiodarone 200mg daily    ATN:- s/p Permcath by IR on 11/20  - renal consult; HD MWF  - Retacrit with HD    History of seizures:- c/w Keppra 750mg BID    HLD:- c/w Atorvastatin    Mood:- c/w Seroquel 25mg qhs    Sleep: Melatonin PRN, on quetiapine - ? for sleep     Pain:- Tylenol PRN, - c/w gabapentin 100mg qhs    GI/Bowel:- Senna 2 tabs daily  - GI ppx: Protonix 40mg daily    /Bladder: Toileting schedule prn    #Diet: Regular, DASH/TLC with fluid restriction 1.5L- ? reason for fluid restriction   - Nutrition to follow    Skin/ Pressure Injury Prevention:- Incisions: sternotomy and left LE vein harvest incisions  - Turn Q2hrs in bed while awake, OOB to Chair, PT/OT    DVT prophylaxis:- Coumadin    Labs with leucocytosis: Improving    Hospitalist consult noted    Disp: Team conference 12/2/20   Goals of modified independent   TDD 12/15 61yo M with PMHx of CAD s/p PCI to LAD, aortic regurgitation, HTN, thoracic aortic dissection s/p emergent surgery c/b CVA with residual left-sided weakness, colostomy for bowel ischemia s/p reversal, and psoriasis on Humira, who presented to [facility] on 11/02/2020 with worsening SOB with associated chest pressure and LE edema; found to have mod-severe MR and AR, now s/p sternotomy for CABG x2, AVR, and MVR. Hospital Course complicated by SANTOS now requiring HD, acute blood loss anemia s/p multiple PRBCs, and atrial flutter. Admitted for multidisciplinary rehab program.    #Comprehensive Multidisciplinary Rehab Program:  - PT/OT/ 3 hours a day 5 days a week  - per OT, patient appeared mildly confused this morning; rec SLP evaluation    #CAD with AR and MR, s/p sternotomy  with CABG x2, AVR, and MVR  - repeat TTE prior to discharge on 11/23 with EF 40%  - Daily INR; c/w Coumadin  - c/w ASA 81mg daily  - c/w atorvastatin 40mg qhs  - c/w Metoprolol tartrate 50mg q8h  on ritalin - will dc     Atrial flutter:- c/w Coumadin and Metoprolol, - c/w Amiodarone 200mg daily    ATN:- s/p Permcath by IR on 11/20  - renal consult; HD MWF  - Retacrit with HD    History of seizures:- c/w Keppra 750mg BID    HLD:- c/w Atorvastatin    Mood:- c/w Seroquel 25mg qhs    Sleep: Melatonin PRN, on quetiapine - ? for sleep     Pain:- Tylenol PRN, - c/w gabapentin 100mg qhs    GI/Bowel:- Senna 2 tabs daily  - GI ppx: Protonix 40mg daily    /Bladder: Toileting schedule prn    #Diet: Regular, DASH/TLC with fluid restriction 1.5L- ? reason for fluid restriction   - Nutrition to follow    Skin/ Pressure Injury Prevention:- Incisions: sternotomy and left LE vein harvest incisions  - Turn Q2hrs in bed while awake, OOB to Chair, PT/OT    DVT prophylaxis:- Coumadin    Labs with leucocytosis: Improving    Hospitalist consult noted    Disp: Team conference 12/2/20   Goals of modified independent   TDD 12/15

## 2020-12-03 NOTE — PROGRESS NOTE ADULT - SUBJECTIVE AND OBJECTIVE BOX
No distress    Vital Signs Last 24 Hrs  T(C): 36.8 (12-03-20 @ 08:19), Max: 37 (12-02-20 @ 20:43)  T(F): 98.2 (12-03-20 @ 08:19), Max: 98.6 (12-02-20 @ 20:43)  HR: 92 (12-03-20 @ 08:19) (92 - 102)  BP: 117/87 (12-03-20 @ 08:19) (109/61 - 131/80)  RR: 16 (12-03-20 @ 08:19) (16 - 20)  SpO2: 99% (12-03-20 @ 08:19) (96% - 99%)    s1s2  b/l air entry  soft  sm edema                              7.8    12.77 )-----------( 198      ( 02 Dec 2020 15:00 )             24.6     02 Dec 2020 15:00    138    |  101    |  69     ----------------------------<  107    4.3     |  27     |  4.51     Ca    8.1        02 Dec 2020 15:00  Phos  2.7       02 Dec 2020 15:00    PT/INR - ( 03 Dec 2020 05:15 )   PT: 20.1 sec;   INR: 1.70 ratio      acetaminophen   Tablet .. 650 milliGRAM(s) Oral every 6 hours PRN  ALBUTerol    90 MICROgram(s) HFA Inhaler 2 Puff(s) Inhalation every 6 hours PRN  aMIOdarone    Tablet 200 milliGRAM(s) Oral daily  ascorbic acid 500 milliGRAM(s) Oral daily  aspirin enteric coated 81 milliGRAM(s) Oral daily  atorvastatin 40 milliGRAM(s) Oral at bedtime  benzocaine 15 mG/menthol 3.6 mG (Sugar-Free) Lozenge 1 Lozenge Oral two times a day PRN  epoetin yolanda-epbx (RETACRIT) Injectable 02260 Unit(s) IV Push <User Schedule>  folic acid 1 milliGRAM(s) Oral daily  gabapentin 100 milliGRAM(s) Oral at bedtime  guaiFENesin   Syrup  (Sugar-Free) 200 milliGRAM(s) Oral every 6 hours PRN  influenza   Vaccine 0.5 milliLiter(s) IntraMuscular once  levETIRAcetam 750 milliGRAM(s) Oral two times a day  melatonin 6 milliGRAM(s) Oral at bedtime  methylphenidate 5 milliGRAM(s) Oral daily  metoprolol tartrate 25 milliGRAM(s) Oral every 12 hours  multivitamin 1 Tablet(s) Oral daily  nystatin Cream 1 Application(s) Topical two times a day  pantoprazole    Tablet 40 milliGRAM(s) Oral two times a day  QUEtiapine 25 milliGRAM(s) Oral at bedtime  warfarin 5 milliGRAM(s) Oral once    A/P:    S/p CABG x 2, AVR, MVR - 11/13/20  Complicated hospital course as per HPI  Now ESRD on HD MWF  Renal diet  TMP as able w/HD  Epogen w/HD 3 x week  Rehab    [phone]

## 2020-12-03 NOTE — CHART NOTE - NSCHARTNOTEFT_GEN_A_CORE
Nutrition Follow Up Note  Hospital Course   (Per Electronic Medical Record)    Source:  Patient [X]  Medical Record [X]      Diet:   Renal, DASH-TLC Diet w/ Thin Liquids  Tolerates Diet Consistency Well  No Chewing/Swallowing Difficulties  No Recent Nausea, Vomiting, Diarrhea or Constipation (as Per Patient)  Consumes 85% of Meals (as Per Documentation)  on Nepro 8oz BID (Provides 850kcal & 38grams of Protein)  Patient Takes Nutrition Supplement Well  Obtained Food Preferences from Patient     Enteral/Parenteral Nutrition: Not Applicable    Current Weight: 205.9lb on 12/3  Obtain Weights Daily  Weight Fluctuates   Obtain New Weight to Confirm Change     Pertinent Medications: MEDICATIONS  (STANDING):  aMIOdarone    Tablet 200 milliGRAM(s) Oral daily  ascorbic acid 500 milliGRAM(s) Oral daily  aspirin enteric coated 81 milliGRAM(s) Oral daily  atorvastatin 40 milliGRAM(s) Oral at bedtime  epoetin yolanda-epbx (RETACRIT) Injectable 11312 Unit(s) IV Push <User Schedule>  folic acid 1 milliGRAM(s) Oral daily  gabapentin 100 milliGRAM(s) Oral at bedtime  influenza   Vaccine 0.5 milliLiter(s) IntraMuscular once  levETIRAcetam 750 milliGRAM(s) Oral two times a day  melatonin 6 milliGRAM(s) Oral at bedtime  methylphenidate 5 milliGRAM(s) Oral <User Schedule>  metoprolol tartrate 25 milliGRAM(s) Oral every 12 hours  multivitamin 1 Tablet(s) Oral daily  nystatin Cream 1 Application(s) Topical two times a day  pantoprazole    Tablet 40 milliGRAM(s) Oral two times a day  QUEtiapine 25 milliGRAM(s) Oral at bedtime    MEDICATIONS  (PRN):  acetaminophen   Tablet .. 650 milliGRAM(s) Oral every 6 hours PRN Temp greater or equal to 38C (100.4F), Mild Pain (1 - 3)  ALBUTerol    90 MICROgram(s) HFA Inhaler 2 Puff(s) Inhalation every 6 hours PRN Shortness of Breath and/or Wheezing  benzocaine 15 mG/menthol 3.6 mG (Sugar-Free) Lozenge 1 Lozenge Oral two times a day PRN Sore Throat  guaiFENesin   Syrup  (Sugar-Free) 200 milliGRAM(s) Oral every 6 hours PRN Cough      Pertinent Labs:  12-02 Na138 mmol/L Glu 107 mg/dL<H> K+ 4.3 mmol/L Cr  4.51 mg/dL<H> BUN 69 mg/dL<H> 12-02 Phos 2.7 mg/dL 12-01 Alb 2.0 g/dL<L> 11-27 PAB 8 mg/dL<L> 11-15 Chol --    LDL --    HDL --    Trig 105 mg/dL    Skin: Stage 1 Pressure Ulcer on Sacrum   Multiple Surgical Incisions  (as Per Nursing Flow Sheet)     Edema: None Noted     Last Bowel Movement: on 12/2    Estimated Needs:   [X] No Change Since Previous Assessment    Previous Nutrition Diagnosis:   Moderate Malnutrition     Nutrition Diagnosis is [X] Ongoing - Continues on Nutrition Supplement & Patient Takes Nutrition Supplement; Education Provided on Proper Nutrition     New Nutrition Diagnosis: [X] Not Applicable    Interventions:   1. Education Provided on Proper Nutrition    2. Recommend Continue Nutrition Plan of Care     Monitoring & Evaluation:   [X] Weights   [X] PO Intake   [X] Skin Integrity   [X] Follow Up (Per Protocol)  [X] Tolerance to Diet Prescription   [X] Other: Labs     Registered Dietitian/Nutritionist Remains Available.  Yoni Tracy RDN    Pager # [phone]  Phone# [phone]

## 2020-12-03 NOTE — PROGRESS NOTE ADULT - SUBJECTIVE AND OBJECTIVE BOX
Patient is a 60y old  Male who presents with a chief complaint of Impairments: AVR  Impairment Codes: 09 Cardiac (03 Dec 2020 08:09)      SUBJECTIVE / OVERNIGHT EVENTS:  Pt seen and examined at bedside. No acute events overnight.  Pt denies cp, palpitations, sob, abd pain, N/V, fever, chills.    ROS:  All other review of systems negative    Allergies    penicillin (Unknown)    Intolerances        MEDICATIONS  (STANDING):  aMIOdarone    Tablet 200 milliGRAM(s) Oral daily  ascorbic acid 500 milliGRAM(s) Oral daily  aspirin enteric coated 81 milliGRAM(s) Oral daily  atorvastatin 40 milliGRAM(s) Oral at bedtime  epoetin yolanda-epbx (RETACRIT) Injectable 99668 Unit(s) IV Push <User Schedule>  folic acid 1 milliGRAM(s) Oral daily  gabapentin 100 milliGRAM(s) Oral at bedtime  influenza   Vaccine 0.5 milliLiter(s) IntraMuscular once  levETIRAcetam 750 milliGRAM(s) Oral two times a day  melatonin 6 milliGRAM(s) Oral at bedtime  methylphenidate 5 milliGRAM(s) Oral <User Schedule>  metoprolol tartrate 25 milliGRAM(s) Oral every 12 hours  multivitamin 1 Tablet(s) Oral daily  nystatin Cream 1 Application(s) Topical two times a day  pantoprazole    Tablet 40 milliGRAM(s) Oral two times a day  QUEtiapine 25 milliGRAM(s) Oral at bedtime    MEDICATIONS  (PRN):  acetaminophen   Tablet .. 650 milliGRAM(s) Oral every 6 hours PRN Temp greater or equal to 38C (100.4F), Mild Pain (1 - 3)  ALBUTerol    90 MICROgram(s) HFA Inhaler 2 Puff(s) Inhalation every 6 hours PRN Shortness of Breath and/or Wheezing  benzocaine 15 mG/menthol 3.6 mG (Sugar-Free) Lozenge 1 Lozenge Oral two times a day PRN Sore Throat  guaiFENesin   Syrup  (Sugar-Free) 200 milliGRAM(s) Oral every 6 hours PRN Cough      Vital Signs Last 24 Hrs  T(C): 36.8 (03 Dec 2020 08:19), Max: 37 (02 Dec 2020 14:40)  T(F): 98.2 (03 Dec 2020 08:19), Max: 98.6 (02 Dec 2020 14:40)  HR: 92 (03 Dec 2020 08:19) (66 - 102)  BP: 117/87 (03 Dec 2020 08:19) (109/61 - 131/80)  BP(mean): --  RR: 16 (03 Dec 2020 08:19) (15 - 20)  SpO2: 99% (03 Dec 2020 08:19) (96% - 100%)  CAPILLARY BLOOD GLUCOSE        I&O's Summary    02 Dec 2020 07:01  -  03 Dec 2020 07:00  --------------------------------------------------------  IN: 0 mL / OUT: 1000 mL / NET: -1000 mL        PHYSICAL EXAM:  GENERAL: NAD, well-developed  CHEST/LUNG: Clear to auscultation bilaterally; No wheeze  HEART: Regular rate and rhythm; No murmurs, rubs, or gallops  ABDOMEN: Soft, Nontender, Nondistended; Bowel sounds present  EXTREMITIES:  2+ Peripheral Pulses, No clubbing, cyanosis. 2+ pitting edema of b/l LE   PSYCH: AAOx3  NEUROLOGY: non-focal  SKIN: Sternotomy site c/d/i. LLE Incision site c/d/i with staples. Right chest permacath    LABS:                        7.8    12.77 )-----------( 198      ( 02 Dec 2020 15:00 )             24.6     12-02    138  |  101  |  69<H>  ----------------------------<  107<H>  4.3   |  27  |  4.51<H>    Ca    8.1<L>      02 Dec 2020 15:00  Phos  2.7     12-02      PT/INR - ( 03 Dec 2020 05:15 )   PT: 20.1 sec;   INR: 1.70 ratio                   RADIOLOGY & ADDITIONAL TESTS:  Results Reviewed:   Imaging Personally Reviewed:  Electrocardiogram Personally Reviewed:    COORDINATION OF CARE:  Care Discussed with Consultants/Other Providers [Y/N]:  Prior or Outpatient Records Reviewed [Y/N]:

## 2020-12-04 ENCOUNTER — TRANSCRIPTION ENCOUNTER (OUTPATIENT)
Age: 60
End: 2020-12-04

## 2020-12-04 LAB
ANION GAP SERPL CALC-SCNC: 6 MMOL/L — SIGNIFICANT CHANGE UP (ref 5–17)
BASOPHILS # BLD AUTO: 0.05 K/UL — SIGNIFICANT CHANGE UP (ref 0–0.2)
BASOPHILS NFR BLD AUTO: 0.5 % — SIGNIFICANT CHANGE UP (ref 0–2)
BUN SERPL-MCNC: 68 MG/DL — HIGH (ref 7–23)
CALCIUM SERPL-MCNC: 8 MG/DL — LOW (ref 8.4–10.5)
CHLORIDE SERPL-SCNC: 101 MMOL/L — SIGNIFICANT CHANGE UP (ref 96–108)
CO2 SERPL-SCNC: 27 MMOL/L — SIGNIFICANT CHANGE UP (ref 22–31)
CREAT SERPL-MCNC: 3.9 MG/DL — HIGH (ref 0.5–1.3)
EOSINOPHIL # BLD AUTO: 0.7 K/UL — HIGH (ref 0–0.5)
EOSINOPHIL NFR BLD AUTO: 7.1 % — HIGH (ref 0–6)
GLUCOSE SERPL-MCNC: 95 MG/DL — SIGNIFICANT CHANGE UP (ref 70–99)
HCT VFR BLD CALC: 24.6 % — LOW (ref 39–50)
HCT VFR BLD CALC: 26 % — LOW (ref 39–50)
HGB BLD-MCNC: 7.5 G/DL — LOW (ref 13–17)
HGB BLD-MCNC: 8 G/DL — LOW (ref 13–17)
IMM GRANULOCYTES NFR BLD AUTO: 0.5 % — SIGNIFICANT CHANGE UP (ref 0–1.5)
INR BLD: 1.87 RATIO — HIGH (ref 0.88–1.16)
LYMPHOCYTES # BLD AUTO: 1.16 K/UL — SIGNIFICANT CHANGE UP (ref 1–3.3)
LYMPHOCYTES # BLD AUTO: 11.7 % — LOW (ref 13–44)
MCHC RBC-ENTMCNC: 29.4 PG — SIGNIFICANT CHANGE UP (ref 27–34)
MCHC RBC-ENTMCNC: 30.3 PG — SIGNIFICANT CHANGE UP (ref 27–34)
MCHC RBC-ENTMCNC: 30.5 GM/DL — LOW (ref 32–36)
MCHC RBC-ENTMCNC: 30.8 GM/DL — LOW (ref 32–36)
MCV RBC AUTO: 96.5 FL — SIGNIFICANT CHANGE UP (ref 80–100)
MCV RBC AUTO: 98.5 FL — SIGNIFICANT CHANGE UP (ref 80–100)
MONOCYTES # BLD AUTO: 1.06 K/UL — HIGH (ref 0–0.9)
MONOCYTES NFR BLD AUTO: 10.7 % — SIGNIFICANT CHANGE UP (ref 2–14)
NEUTROPHILS # BLD AUTO: 6.86 K/UL — SIGNIFICANT CHANGE UP (ref 1.8–7.4)
NEUTROPHILS NFR BLD AUTO: 69.5 % — SIGNIFICANT CHANGE UP (ref 43–77)
NRBC # BLD: 0 /100 WBCS — SIGNIFICANT CHANGE UP (ref 0–0)
NRBC # BLD: 0 /100 WBCS — SIGNIFICANT CHANGE UP (ref 0–0)
OB PNL STL: NEGATIVE — SIGNIFICANT CHANGE UP
PLATELET # BLD AUTO: 205 K/UL — SIGNIFICANT CHANGE UP (ref 150–400)
PLATELET # BLD AUTO: 206 K/UL — SIGNIFICANT CHANGE UP (ref 150–400)
POTASSIUM SERPL-MCNC: 4.7 MMOL/L — SIGNIFICANT CHANGE UP (ref 3.5–5.3)
POTASSIUM SERPL-SCNC: 4.7 MMOL/L — SIGNIFICANT CHANGE UP (ref 3.5–5.3)
PROTHROM AB SERPL-ACNC: 22 SEC — HIGH (ref 10.6–13.6)
RBC # BLD: 2.55 M/UL — LOW (ref 4.2–5.8)
RBC # BLD: 2.64 M/UL — LOW (ref 4.2–5.8)
RBC # FLD: 16.5 % — HIGH (ref 10.3–14.5)
RBC # FLD: 16.7 % — HIGH (ref 10.3–14.5)
SODIUM SERPL-SCNC: 134 MMOL/L — LOW (ref 135–145)
WBC # BLD: 10.27 K/UL — SIGNIFICANT CHANGE UP (ref 3.8–10.5)
WBC # BLD: 9.88 K/UL — SIGNIFICANT CHANGE UP (ref 3.8–10.5)
WBC # FLD AUTO: 10.27 K/UL — SIGNIFICANT CHANGE UP (ref 3.8–10.5)
WBC # FLD AUTO: 9.88 K/UL — SIGNIFICANT CHANGE UP (ref 3.8–10.5)

## 2020-12-04 PROCEDURE — 99232 SBSQ HOSP IP/OBS MODERATE 35: CPT | Mod: GC

## 2020-12-04 PROCEDURE — 99232 SBSQ HOSP IP/OBS MODERATE 35: CPT

## 2020-12-04 RX ORDER — WARFARIN SODIUM 2.5 MG/1
5 TABLET ORAL ONCE
Refills: 0 | Status: COMPLETED | OUTPATIENT
Start: 2020-12-04 | End: 2020-12-04

## 2020-12-04 RX ORDER — ACETAMINOPHEN 500 MG
975 TABLET ORAL
Refills: 0 | Status: DISCONTINUED | OUTPATIENT
Start: 2020-12-04 | End: 2020-12-14

## 2020-12-04 RX ADMIN — AMIODARONE HYDROCHLORIDE 200 MILLIGRAM(S): 400 TABLET ORAL at 05:32

## 2020-12-04 RX ADMIN — Medication 975 MILLIGRAM(S): at 21:15

## 2020-12-04 RX ADMIN — NYSTATIN CREAM 1 APPLICATION(S): 100000 CREAM TOPICAL at 18:05

## 2020-12-04 RX ADMIN — Medication 1 MILLIGRAM(S): at 12:07

## 2020-12-04 RX ADMIN — PANTOPRAZOLE SODIUM 40 MILLIGRAM(S): 20 TABLET, DELAYED RELEASE ORAL at 05:31

## 2020-12-04 RX ADMIN — Medication 5 MILLIGRAM(S): at 12:07

## 2020-12-04 RX ADMIN — ATORVASTATIN CALCIUM 40 MILLIGRAM(S): 80 TABLET, FILM COATED ORAL at 21:15

## 2020-12-04 RX ADMIN — PANTOPRAZOLE SODIUM 40 MILLIGRAM(S): 20 TABLET, DELAYED RELEASE ORAL at 18:04

## 2020-12-04 RX ADMIN — Medication 1 TABLET(S): at 12:07

## 2020-12-04 RX ADMIN — Medication 6 MILLIGRAM(S): at 21:15

## 2020-12-04 RX ADMIN — Medication 25 MILLIGRAM(S): at 18:04

## 2020-12-04 RX ADMIN — LEVETIRACETAM 750 MILLIGRAM(S): 250 TABLET, FILM COATED ORAL at 18:04

## 2020-12-04 RX ADMIN — BENZOCAINE AND MENTHOL 1 LOZENGE: 5; 1 LIQUID ORAL at 05:42

## 2020-12-04 RX ADMIN — Medication 25 MILLIGRAM(S): at 05:32

## 2020-12-04 RX ADMIN — ERYTHROPOIETIN 10000 UNIT(S): 10000 INJECTION, SOLUTION INTRAVENOUS; SUBCUTANEOUS at 14:32

## 2020-12-04 RX ADMIN — NYSTATIN CREAM 1 APPLICATION(S): 100000 CREAM TOPICAL at 05:32

## 2020-12-04 RX ADMIN — GABAPENTIN 100 MILLIGRAM(S): 400 CAPSULE ORAL at 21:15

## 2020-12-04 RX ADMIN — LEVETIRACETAM 750 MILLIGRAM(S): 250 TABLET, FILM COATED ORAL at 05:32

## 2020-12-04 RX ADMIN — Medication 200 MILLIGRAM(S): at 21:55

## 2020-12-04 RX ADMIN — Medication 975 MILLIGRAM(S): at 22:09

## 2020-12-04 RX ADMIN — QUETIAPINE FUMARATE 25 MILLIGRAM(S): 200 TABLET, FILM COATED ORAL at 21:15

## 2020-12-04 RX ADMIN — Medication 500 MILLIGRAM(S): at 12:07

## 2020-12-04 RX ADMIN — WARFARIN SODIUM 5 MILLIGRAM(S): 2.5 TABLET ORAL at 21:15

## 2020-12-04 RX ADMIN — Medication 81 MILLIGRAM(S): at 12:07

## 2020-12-04 NOTE — PROGRESS NOTE ADULT - SUBJECTIVE AND OBJECTIVE BOX
Patient is a 60y old  Male who presents with a chief complaint of Impairments: AVR  Impairment Codes: 09 Cardiac (03 Dec 2020 17:30)      SUBJECTIVE / OVERNIGHT EVENTS:  Pt seen and examined at bedside. No acute events overnight.  Pt denies cp, palpitations, sob, abd pain, N/V, fever, chills.    ROS:  All other review of systems negative    Allergies    penicillin (Unknown)    Intolerances        MEDICATIONS  (STANDING):  aMIOdarone    Tablet 200 milliGRAM(s) Oral daily  ascorbic acid 500 milliGRAM(s) Oral daily  aspirin enteric coated 81 milliGRAM(s) Oral daily  atorvastatin 40 milliGRAM(s) Oral at bedtime  epoetin yolanda-epbx (RETACRIT) Injectable 82682 Unit(s) IV Push <User Schedule>  folic acid 1 milliGRAM(s) Oral daily  gabapentin 100 milliGRAM(s) Oral at bedtime  influenza   Vaccine 0.5 milliLiter(s) IntraMuscular once  levETIRAcetam 750 milliGRAM(s) Oral two times a day  melatonin 6 milliGRAM(s) Oral at bedtime  methylphenidate 5 milliGRAM(s) Oral daily  metoprolol tartrate 25 milliGRAM(s) Oral every 12 hours  multivitamin 1 Tablet(s) Oral daily  nystatin Cream 1 Application(s) Topical two times a day  pantoprazole    Tablet 40 milliGRAM(s) Oral two times a day  QUEtiapine 25 milliGRAM(s) Oral at bedtime    MEDICATIONS  (PRN):  acetaminophen   Tablet .. 650 milliGRAM(s) Oral every 6 hours PRN Temp greater or equal to 38C (100.4F), Mild Pain (1 - 3)  ALBUTerol    90 MICROgram(s) HFA Inhaler 2 Puff(s) Inhalation every 6 hours PRN Shortness of Breath and/or Wheezing  benzocaine 15 mG/menthol 3.6 mG (Sugar-Free) Lozenge 1 Lozenge Oral two times a day PRN Sore Throat  guaiFENesin   Syrup  (Sugar-Free) 200 milliGRAM(s) Oral every 6 hours PRN Cough      Vital Signs Last 24 Hrs  T(C): 36.7 (03 Dec 2020 21:15), Max: 36.7 (03 Dec 2020 21:15)  T(F): 98 (03 Dec 2020 21:15), Max: 98 (03 Dec 2020 21:15)  HR: 101 (04 Dec 2020 05:38) (98 - 101)  BP: 116/72 (04 Dec 2020 05:38) (116/72 - 122/76)  BP(mean): --  RR: 18 (03 Dec 2020 21:15) (18 - 18)  SpO2: 99% (03 Dec 2020 23:11) (99% - 99%)  CAPILLARY BLOOD GLUCOSE        I&O's Summary      PHYSICAL EXAM:  GENERAL: NAD, well-developed  CHEST/LUNG: Clear to auscultation bilaterally; No wheeze  HEART: Regular rate and rhythm; No murmurs, rubs, or gallops  ABDOMEN: Soft, Nontender, Nondistended; Bowel sounds present  EXTREMITIES:  2+ Peripheral Pulses, No clubbing, cyanosis. ACE Wrapped b/l LE   PSYCH: AAOx3  NEUROLOGY: non-focal  SKIN: Sternotomy site c/d/i. LLE Incision site c/d/i with staples. Right chest permacath    LABS:                        7.8    12.77 )-----------( 198      ( 02 Dec 2020 15:00 )             24.6     12-02    138  |  101  |  69<H>  ----------------------------<  107<H>  4.3   |  27  |  4.51<H>    Ca    8.1<L>      02 Dec 2020 15:00  Phos  2.7     12-02      PT/INR - ( 04 Dec 2020 08:28 )   PT: 22.0 sec;   INR: 1.87 ratio                   RADIOLOGY & ADDITIONAL TESTS:  Results Reviewed:   Imaging Personally Reviewed:  Electrocardiogram Personally Reviewed:    COORDINATION OF CARE:  Care Discussed with Consultants/Other Providers [Y/N]:  Prior or Outpatient Records Reviewed [Y/N]:

## 2020-12-04 NOTE — PROGRESS NOTE ADULT - ATTENDING COMMENTS
Chart reviewed. Patient seen at bedside.   States that he feels ok, Cough overnight- improved with robitussin and inhaler  Am labs with Hb 8.  Will transfuse 1 unit with HD - D/W nephrology.     2. Right hand numbness - ? reason will monitor    3. Continue rehab program

## 2020-12-04 NOTE — PROGRESS NOTE ADULT - ASSESSMENT
61yo M with PMHx of CAD s/p PCI to LAD, aortic regurgitation, HTN, thoracic aortic dissection s/p emergent surgery c/b CVA with residual left-sided weakness, colostomy for bowel ischemia s/p reversal, and psoriasis on Humira, who presented to [facility] on 11/02/2020 with worsening SOB with associated chest pressure and LE edema; found to have mod-severe MR and AR, now s/p sternotomy for CABG x2, AVR, and MVR. Hospital Course complicated by SANTOS now requiring HD, acute blood loss anemia s/p multiple PRBCs, and atrial flutter. Admitted for multidisciplinary rehab program.    #Comprehensive Multidisciplinary Rehab Program:  - PT/OT/ 3 hours a day 5 days a week    #CAD with AR and MR, s/p sternotomy  with CABG x2, AVR, and MVR  - repeat TTE prior to discharge on 11/23 with EF 40%  - Daily INR; c/w Coumadin  - c/w ASA 81mg daily  - c/w atorvastatin 40mg qhs  - c/w Metoprolol tartrate 50mg q8h  - weaning Ritalin: 5mg daily for 3 days, then 5mg q48h, then DC    #Anemia:  - CBC today with T&S  - transfuse if hgb <8 during HD    Atrial flutter:- c/w Coumadin and Metoprolol, - c/w Amiodarone 200mg daily    ATN:- s/p Permcath by IR on 11/20  - renal consult; HD MWF  - Retacrit with HD    History of seizures:- c/w Keppra 750mg BID    HLD:- c/w Atorvastatin    Mood:- c/w Seroquel 25mg qhs    Sleep: Melatonin PRN, on quetiapine - ? for sleep     Pain:- Tylenol PRN, - c/w gabapentin 100mg qhs    GI/Bowel:- Senna 2 tabs daily  - GI ppx: Protonix 40mg daily    /Bladder: Toileting schedule prn    #Diet: Regular, DASH/TLC with fluid restriction 1.5L- ? reason for fluid restriction   - Nutrition to follow    Skin/ Pressure Injury Prevention:- Incisions: sternotomy and left LE vein harvest incisions  - Turn Q2hrs in bed while awake, OOB to Chair, PT/OT    DVT prophylaxis:- Coumadin    Labs with leucocytosis: Improving    Hospitalist consult noted    Disp: Team conference 12/2/20   Goals of modified independent   TDD 12/15

## 2020-12-04 NOTE — PROGRESS NOTE ADULT - SUBJECTIVE AND OBJECTIVE BOX
DAILY PROGRESS NOTE:  HPI:  JONATHAN GIBSON is a 59yo M with PMHx of CAD s/p PCI to LAD, aortic regurgitation, HTN, thoracic aortic dissection s/p emergent surgery c/b CVA with residual left-sided weakness, colostomy for bowel ischemia s/p reversal, and psoriasis on Humira, who presented to [facility] on 2020 with worsening SOB with associated chest pressure and LE edema. In the ED, had elevated pro-BNP >33k, SCr, acute anemia, and HTN emergency. Evaluated on admission by cardiology; symptoms improved following IV Lasix. Echocardiogram on admission showing EF 50-55%, enlarged LA, mod-sever MR, mod TR, mod-severe AR, and enlarged aortic root.     Patient evaluated by CT surgery for potential AVR/MVR; required CVVHD medical optimization. GI consulted for acute anemia; patient underwent EGD on  with no significant findings. Patient underwent sternotomy for CABG x2, AVR, and MVR on ; post-op c/b acute blood loss anemia requiring multiple blood products, pressors, and inotropes. Patient was extubated on  and underwent permacath placement with IR on . Pressors and inotropes weaned off and transferred to floors on . Repeat TTE with doppler showing EF 40% with mod LV systolic dysfunction.    Patient was evaluated by PM&R and therapy for functional deficits and gait/ ADL impairments and recommended acute rehabilitation. Patient was medically optimized for discharge to [facility] on 2020.    Patient was seen and examined at the bedside on arrival. Denied HA, fever, chills, coughing, wheezing, SOB, or abdominal pain. Last BM was this morning. Appetite has returned, requesting dinner. Endorses some weakness and numbness on the left LE 2/2 residual deficits from prior stroke. Had some low back pain prior to arrival, but now resolved.  (2020 15:16)      Subjective:  Patient was seen and examined at the bedside this AM. No acute overnight events. Had coughing fit overnight that resolved with Robitussin and Proventil. ACE wraps ob b/l LEs this morning for edema. Denied fever, chills, new SOB, abdominal pain, or constipation. Last BM was this AM.       Physical Exam:  Vital Signs Last 24 Hrs  T(C): 36.7 (04 Dec 2020 09:13), Max: 36.7 (03 Dec 2020 21:15)  T(F): 98.1 (04 Dec 2020 09:13), Max: 98.1 (04 Dec 2020 09:13)  HR: 91 (04 Dec 2020 09:13) (91 - 101)  BP: 128/79 (04 Dec 2020 09:13) (116/72 - 128/79)  RR: 15 (04 Dec 2020 09:13) (15 - 18)  SpO2: 96% (04 Dec 2020 09:13) (96% - 99%)      Constitutional - NAD, Comfortable  Chest - CTAB  Cardiovascular - RRR  Abdomen - BS+, Soft, NTND  Extremities -LE edema + -bilateral. Weakness to abduction in 5th digit on the right hand.  Neurologic Exam -                    Cognitive - Awake, Alert, AAO to self, place, date, year, situation     No facial weakness     Motor left sided weakness from prior stroke      Psychiatric - Mood stable, Affect WNL  Skin:  Sternal incision from sternotomy, no staples in place; appears c/d/i. Left LE incision from vein harvesting, staples in place mild erythema surround incision; appears c/d/i. Right inner thigh incision, as well with staples in place; appears c/d/i. Stage 1 sacral lesion  Lines: Right IJ Permacath on the right chest wall. Midline in right medial arm      Recent Labs/Imagin.8    12.77 )-----------( 198      ( 02 Dec 2020 15:00 )             24.6     12-02    138  |  101  |  69<H>  ----------------------------<  107<H>  4.3   |  27  |  4.51<H>    Ca    8.1<L>      02 Dec 2020 15:00  Phos  2.7     12-02  PT/INR - ( 04 Dec 2020 08:28 )   PT: 22.0 sec;   INR: 1.87 ratio         Medications:  acetaminophen   Tablet .. 650 milliGRAM(s) Oral every 6 hours PRN  ALBUTerol    90 MICROgram(s) HFA Inhaler 2 Puff(s) Inhalation every 6 hours PRN  aMIOdarone    Tablet 200 milliGRAM(s) Oral daily  ascorbic acid 500 milliGRAM(s) Oral daily  aspirin enteric coated 81 milliGRAM(s) Oral daily  atorvastatin 40 milliGRAM(s) Oral at bedtime  benzocaine 15 mG/menthol 3.6 mG (Sugar-Free) Lozenge 1 Lozenge Oral two times a day PRN  epoetin yolanda-epbx (RETACRIT) Injectable 39459 Unit(s) IV Push <User Schedule>  folic acid 1 milliGRAM(s) Oral daily  gabapentin 100 milliGRAM(s) Oral at bedtime  guaiFENesin   Syrup  (Sugar-Free) 200 milliGRAM(s) Oral every 6 hours PRN  influenza   Vaccine 0.5 milliLiter(s) IntraMuscular once  levETIRAcetam 750 milliGRAM(s) Oral two times a day  melatonin 6 milliGRAM(s) Oral at bedtime  methylphenidate 5 milliGRAM(s) Oral daily  metoprolol tartrate 25 milliGRAM(s) Oral every 12 hours  multivitamin 1 Tablet(s) Oral daily  nystatin Cream 1 Application(s) Topical two times a day  pantoprazole    Tablet 40 milliGRAM(s) Oral two times a day  QUEtiapine 25 milliGRAM(s) Oral at bedtime

## 2020-12-04 NOTE — PROGRESS NOTE ADULT - SUBJECTIVE AND OBJECTIVE BOX
No distress    Vital Signs Last 24 Hrs  T(C): 36.7 (12-04-20 @ 09:13), Max: 36.7 (12-03-20 @ 21:15)  T(F): 98.1 (12-04-20 @ 09:13), Max: 98.1 (12-04-20 @ 09:13)  HR: 89 (12-04-20 @ 14:05) (89 - 101)  BP: 132/75 (12-04-20 @ 14:05) (116/72 - 132/75)  RR: 14 (12-04-20 @ 14:05) (14 - 18)  SpO2: 100% (12-04-20 @ 14:05) (96% - 100%)    s1s2  b/l air entry  soft  sm edema                                       7.5    10.27 )-----------( 205      ( 04 Dec 2020 14:22 )             24.6     04 Dec 2020 10:30    134    |  101    |  68     ----------------------------<  95     4.7     |  27     |  3.90     Ca    8.0        04 Dec 2020 10:30  Phos  2.7       02 Dec 2020 15:00    acetaminophen   Tablet .. 650 milliGRAM(s) Oral every 6 hours PRN  ALBUTerol    90 MICROgram(s) HFA Inhaler 2 Puff(s) Inhalation every 6 hours PRN  aMIOdarone    Tablet 200 milliGRAM(s) Oral daily  ascorbic acid 500 milliGRAM(s) Oral daily  aspirin enteric coated 81 milliGRAM(s) Oral daily  atorvastatin 40 milliGRAM(s) Oral at bedtime  benzocaine 15 mG/menthol 3.6 mG (Sugar-Free) Lozenge 1 Lozenge Oral two times a day PRN  epoetin yolanda-epbx (RETACRIT) Injectable 01891 Unit(s) IV Push <User Schedule>  folic acid 1 milliGRAM(s) Oral daily  gabapentin 100 milliGRAM(s) Oral at bedtime  guaiFENesin   Syrup  (Sugar-Free) 200 milliGRAM(s) Oral every 6 hours PRN  influenza   Vaccine 0.5 milliLiter(s) IntraMuscular once  levETIRAcetam 750 milliGRAM(s) Oral two times a day  melatonin 6 milliGRAM(s) Oral at bedtime  methylphenidate 5 milliGRAM(s) Oral daily  metoprolol tartrate 25 milliGRAM(s) Oral every 12 hours  multivitamin 1 Tablet(s) Oral daily  nystatin Cream 1 Application(s) Topical two times a day  pantoprazole    Tablet 40 milliGRAM(s) Oral two times a day  QUEtiapine 25 milliGRAM(s) Oral at bedtime  warfarin 5 milliGRAM(s) Oral once    A/P:    S/p CABG x 2, AVR, MVR - 11/13/20  Complicated hospital course as per HPI  Now ESRD on HD MWF  Renal diet  TMP as able w/HD  Epogen w/HD 3 x week  To have 1u PRBCs w/HD today  F/u Hgb  D/w rehab team    [phone]

## 2020-12-04 NOTE — PROGRESS NOTE ADULT - ASSESSMENT
61yo Male with hx of CAD s/p PCI to LAD, aortic regurgitation, HTN, thoracic aortic dissection s/p emergent surgery c/b CVA with residual left-sided weakness, colostomy for bowel ischemia s/p reversal, and psoriasis on Humira, presented to [facility] for acute rehab from [facility] with complaints of cp a/w SOB and LE swelling, noted to have enlarged LA, mod-sever MR, mod TR, mod-severe AR, and enlarged aortic root. Pt s/p CABG x2, AVR, and MVR on 11/13. Pt was medically optimized with CVVHD. Post op complications with acute blood loss anemia requiring multiple blood products. Pt was stable for discharge to acute rehab.    #Debility  -Comprehensive Multidisciplinary Rehab Program  -Gait, ADL, Functional impairments  -PT/OT/ SLP 3 hours a day 5 days a week    #CAD with AR and MR  #Combined systolic/diastolic CHF  -s/p sternotomy  with CABG x2, AVR, and MVR 11/13  -c/w ASA/Statin/Metoprolol  -Monitor vitals    #Atrial flutter  -c/w Coumadin and Metoprolol  -c/w Amiodarone 200mg daily  -Continue Coumadin 3mg until therapeutic INR  -INR Goal 2-3    #ATN  -HD MWF  -Nephrology on board  -avoid nephrotoxic agents  -Retacrit with HD    #History of seizures  - c/w Keppra 750mg BID    #DVT prophylaxis:  - Coumadin  - SCDs

## 2020-12-05 LAB
FERRITIN SERPL-MCNC: 1099 NG/ML — HIGH (ref 30–400)
FOLATE SERPL-MCNC: >20 NG/ML — SIGNIFICANT CHANGE UP
HCT VFR BLD CALC: 29 % — LOW (ref 39–50)
HGB BLD-MCNC: 9 G/DL — LOW (ref 13–17)
INR BLD: 2.4 RATIO — HIGH (ref 0.88–1.16)
IRON SATN MFR SERPL: 13 % — LOW (ref 16–55)
IRON SATN MFR SERPL: 29 UG/DL — LOW (ref 45–165)
MCHC RBC-ENTMCNC: 30.1 PG — SIGNIFICANT CHANGE UP (ref 27–34)
MCHC RBC-ENTMCNC: 31 GM/DL — LOW (ref 32–36)
MCV RBC AUTO: 97 FL — SIGNIFICANT CHANGE UP (ref 80–100)
NRBC # BLD: 0 /100 WBCS — SIGNIFICANT CHANGE UP (ref 0–0)
PLATELET # BLD AUTO: 200 K/UL — SIGNIFICANT CHANGE UP (ref 150–400)
PROTHROM AB SERPL-ACNC: 27.9 SEC — HIGH (ref 10.6–13.6)
RBC # BLD: 2.99 M/UL — LOW (ref 4.2–5.8)
RBC # FLD: 16.7 % — HIGH (ref 10.3–14.5)
TIBC SERPL-MCNC: 227 UG/DL — SIGNIFICANT CHANGE UP (ref 220–430)
UIBC SERPL-MCNC: 197 UG/DL — SIGNIFICANT CHANGE UP (ref 110–370)
VIT B12 SERPL-MCNC: 574 PG/ML — SIGNIFICANT CHANGE UP (ref 232–1245)
WBC # BLD: 9.04 K/UL — SIGNIFICANT CHANGE UP (ref 3.8–10.5)
WBC # FLD AUTO: 9.04 K/UL — SIGNIFICANT CHANGE UP (ref 3.8–10.5)

## 2020-12-05 PROCEDURE — 99232 SBSQ HOSP IP/OBS MODERATE 35: CPT

## 2020-12-05 RX ORDER — WARFARIN SODIUM 2.5 MG/1
3 TABLET ORAL AT BEDTIME
Refills: 0 | Status: COMPLETED | OUTPATIENT
Start: 2020-12-05 | End: 2020-12-05

## 2020-12-05 RX ADMIN — Medication 25 MILLIGRAM(S): at 05:49

## 2020-12-05 RX ADMIN — Medication 25 MILLIGRAM(S): at 17:57

## 2020-12-05 RX ADMIN — Medication 81 MILLIGRAM(S): at 12:07

## 2020-12-05 RX ADMIN — LEVETIRACETAM 750 MILLIGRAM(S): 250 TABLET, FILM COATED ORAL at 05:49

## 2020-12-05 RX ADMIN — Medication 1 MILLIGRAM(S): at 12:07

## 2020-12-05 RX ADMIN — NYSTATIN CREAM 1 APPLICATION(S): 100000 CREAM TOPICAL at 17:57

## 2020-12-05 RX ADMIN — Medication 500 MILLIGRAM(S): at 12:07

## 2020-12-05 RX ADMIN — QUETIAPINE FUMARATE 25 MILLIGRAM(S): 200 TABLET, FILM COATED ORAL at 21:25

## 2020-12-05 RX ADMIN — Medication 6 MILLIGRAM(S): at 21:25

## 2020-12-05 RX ADMIN — Medication 975 MILLIGRAM(S): at 21:25

## 2020-12-05 RX ADMIN — LEVETIRACETAM 750 MILLIGRAM(S): 250 TABLET, FILM COATED ORAL at 17:57

## 2020-12-05 RX ADMIN — WARFARIN SODIUM 3 MILLIGRAM(S): 2.5 TABLET ORAL at 21:25

## 2020-12-05 RX ADMIN — PANTOPRAZOLE SODIUM 40 MILLIGRAM(S): 20 TABLET, DELAYED RELEASE ORAL at 05:48

## 2020-12-05 RX ADMIN — Medication 1 TABLET(S): at 12:07

## 2020-12-05 RX ADMIN — Medication 975 MILLIGRAM(S): at 22:15

## 2020-12-05 RX ADMIN — AMIODARONE HYDROCHLORIDE 200 MILLIGRAM(S): 400 TABLET ORAL at 05:48

## 2020-12-05 RX ADMIN — PANTOPRAZOLE SODIUM 40 MILLIGRAM(S): 20 TABLET, DELAYED RELEASE ORAL at 17:57

## 2020-12-05 RX ADMIN — ATORVASTATIN CALCIUM 40 MILLIGRAM(S): 80 TABLET, FILM COATED ORAL at 21:25

## 2020-12-05 RX ADMIN — Medication 5 MILLIGRAM(S): at 12:05

## 2020-12-05 RX ADMIN — GABAPENTIN 100 MILLIGRAM(S): 400 CAPSULE ORAL at 21:25

## 2020-12-05 RX ADMIN — Medication 200 MILLIGRAM(S): at 23:11

## 2020-12-05 RX ADMIN — NYSTATIN CREAM 1 APPLICATION(S): 100000 CREAM TOPICAL at 05:49

## 2020-12-05 NOTE — PROGRESS NOTE ADULT - ASSESSMENT
59yo M with PMHx of CAD s/p PCI to LAD, aortic regurgitation, HTN, thoracic aortic dissection s/p emergent surgery c/b CVA with residual left-sided weakness, colostomy for bowel ischemia s/p reversal, and psoriasis on Humira, who presented to [facility] on 11/02/2020 with worsening SOB with associated chest pressure and LE edema; found to have mod-severe MR and AR, now s/p sternotomy for CABG x2, AVR, and MVR. Hospital Course complicated by SANTOS now requiring HD, acute blood loss anemia s/p multiple PRBCs, and atrial flutter. Admitted for multidisciplinary rehab program.    #Comprehensive Multidisciplinary Rehab Program:  - PT/OT/ 3 hours a day 5 days a week    #CAD with AR and MR, s/p sternotomy  with CABG x2, AVR, and MVR  - repeat TTE prior to discharge on 11/23 with EF 40%  - Daily INR; c/w Coumadin. INR 2.40 12/5 (increased from 1.7, 1.87). Will decrease coumadin from 5 to 3 mg. INR 12/6  - c/w ASA 81mg daily  - c/w atorvastatin 40mg qhs  - c/w Metoprolol tartrate 50mg q8h  - continue weaning Ritalin: 5mg daily for 3 days, then 5mg q48h, then DC    #Anemia:  - transfuse if hgb <8 during HD  -continue retacrit  -Hgb 7.5 12/4. Currently 9 12/5 s/p transfusion  -f/u anemia workup 12/5     #Atrial flutter:  - c/w Coumadin and Metoprolol  - c/w Amiodarone 200mg daily  -HR 70-97, monitor as ritalin tapered    #ATN:-   s/p Permcath by IR on 11/20  -  HD MWF  - Retacrit with HD    #History of seizures:-   c/w Keppra 750mg BID    #HLD:  - c/w Atorvastatin    #Mood:  - c/w Seroquel 25mg qhs    #Sleep:   Melatonin PRN  - quetiapine     #Pain:-   Tylenol PRN,   - c/w gabapentin 100mg qhs    #GI/Bowel:  - Senna 2 tabs daily  - GI ppx: Protonix 40mg daily      #Diet:   Regular, DASH/TLC with fluid restriction 1.5L  - Nutrition to follow    #Skin/ Pressure Injury Prevention:  - Incisions: sternotomy and left LE vein harvest incisions  - Turn Q2hrs in bed while awake, OOB to Chair, PT/OT    DVT prophylaxis:  - Coumadin    Disp: Team conference 12/2/20   Goals of modified independent   TDD 12/15

## 2020-12-05 NOTE — PROGRESS NOTE ADULT - COMMENTS
Patient still with cough, improved on robitussin. Has IS at bedside, admits reduced compliance. Importance reviewed; patient states he is aware, having been through multiple cardiac procedures. Pain overall controlled, no SOB o chest pressure/palpitations. Has difficult time sleeping with HOB elevated due to sternal discomfort

## 2020-12-05 NOTE — PROGRESS NOTE ADULT - SUBJECTIVE AND OBJECTIVE BOX
Patient is a 60y old  Male who presents with a chief complaint of Impairments: AVR  Impairment Codes: 09 Cardiac (05 Dec 2020 09:56)      HPI:  JONATHAN GIBSON is a 59yo M with PMHx of CAD s/p PCI to LAD, aortic regurgitation, HTN, thoracic aortic dissection s/p emergent surgery c/b CVA with residual left-sided weakness, colostomy for bowel ischemia s/p reversal, and psoriasis on Humira, who presented to [facility] on 2020 with worsening SOB with associated chest pressure and LE edema. In the ED, had elevated pro-BNP >33k, SCr, acute anemia, and HTN emergency. Evaluated on admission by cardiology; symptoms improved following IV Lasix. Echocardiogram on admission showing EF 50-55%, enlarged LA, mod-sever MR, mod TR, mod-severe AR, and enlarged aortic root.     Patient evaluated by CT surgery for potential AVR/MVR; required CVVHD medical optimization. GI consulted for acute anemia; patient underwent EGD on  with no significant findings. Patient underwent sternotomy for CABG x2, AVR, and MVR on ; post-op c/b acute blood loss anemia requiring multiple blood products, pressors, and inotropes. Patient was extubated on  and underwent permacath placement with IR on . Pressors and inotropes weaned off and transferred to floors on . Repeat TTE with doppler showing EF 40% with mod LV systolic dysfunction.    Patient was evaluated by PM&R and therapy for functional deficits and gait/ ADL impairments and recommended acute rehabilitation. Patient was medically optimized for discharge to [facility] on 2020.    Patient was seen and examined at the bedside on arrival. Denied HA, fever, chills, coughing, wheezing, SOB, or abdominal pain. Last BM was this morning. Appetite has returned, requesting dinner. Endorses some weakness and numbness on the left LE 2/2 residual deficits from prior stroke. Had some low back pain prior to arrival, but now resolved.  (2020 15:16)      PAST MEDICAL & SURGICAL HISTORY:  CVA (cerebral vascular accident)    CHF (congestive heart failure)    H/O colectomy    Aorta disorder        MEDICATIONS  (STANDING):  acetaminophen   Tablet .. 975 milliGRAM(s) Oral <User Schedule>  aMIOdarone    Tablet 200 milliGRAM(s) Oral daily  ascorbic acid 500 milliGRAM(s) Oral daily  aspirin enteric coated 81 milliGRAM(s) Oral daily  atorvastatin 40 milliGRAM(s) Oral at bedtime  epoetin yolanda-epbx (RETACRIT) Injectable 28865 Unit(s) IV Push <User Schedule>  folic acid 1 milliGRAM(s) Oral daily  gabapentin 100 milliGRAM(s) Oral at bedtime  influenza   Vaccine 0.5 milliLiter(s) IntraMuscular once  levETIRAcetam 750 milliGRAM(s) Oral two times a day  melatonin 6 milliGRAM(s) Oral at bedtime  methylphenidate 5 milliGRAM(s) Oral daily  metoprolol tartrate 25 milliGRAM(s) Oral every 12 hours  multivitamin 1 Tablet(s) Oral daily  nystatin Cream 1 Application(s) Topical two times a day  pantoprazole    Tablet 40 milliGRAM(s) Oral two times a day  QUEtiapine 25 milliGRAM(s) Oral at bedtime    MEDICATIONS  (PRN):  acetaminophen   Tablet .. 650 milliGRAM(s) Oral every 6 hours PRN Temp greater or equal to 38C (100.4F), Mild Pain (1 - 3)  ALBUTerol    90 MICROgram(s) HFA Inhaler 2 Puff(s) Inhalation every 6 hours PRN Shortness of Breath and/or Wheezing  benzocaine 15 mG/menthol 3.6 mG (Sugar-Free) Lozenge 1 Lozenge Oral two times a day PRN Sore Throat  guaiFENesin   Syrup  (Sugar-Free) 200 milliGRAM(s) Oral every 6 hours PRN Cough      Allergies    penicillin (Unknown)    Intolerances          VITALS  60y  Vital Signs Last 24 Hrs  T(C): 36.7 (04 Dec 2020 19:48), Max: 36.7 (04 Dec 2020 19:48)  T(F): 98 (04 Dec 2020 19:48), Max: 98 (04 Dec 2020 19:48)  HR: 92 (05 Dec 2020 05:47) (70 - 97)  BP: 135/77 (05 Dec 2020 05:47) (126/72 - 148/74)  BP(mean): --  RR: 15 (04 Dec 2020 19:48) (14 - 15)  SpO2: 97% (04 Dec 2020 19:48) (97% - 100%)  Daily     Daily Weight in k.8 (05 Dec 2020 07:18)        RECENT LABS:                          9.0    9.04  )-----------( 200      ( 05 Dec 2020 08:05 )             29.0     12-04    134<L>  |  101  |  68<H>  ----------------------------<  95  4.7   |  27  |  3.90<H>    Ca    8.0<L>      04 Dec 2020 10:30        PT/INR - ( 05 Dec 2020 08:05 )   PT: 27.9 sec;   INR: 2.40 ratio                 CAPILLARY BLOOD GLUCOSE

## 2020-12-05 NOTE — PROGRESS NOTE ADULT - ASSESSMENT
59yo Male with hx of CAD s/p PCI to LAD, aortic regurgitation, HTN, thoracic aortic dissection s/p emergent surgery c/b CVA with residual left-sided weakness, colostomy for bowel ischemia s/p reversal, and psoriasis on Humira, presented to [facility] for acute rehab from [facility] with complaints of cp a/w SOB and LE swelling, noted to have enlarged LA, mod-sever MR, mod TR, mod-severe AR, and enlarged aortic root. Pt s/p CABG x2, AVR, and MVR on 11/13. Pt was medically optimized with CVVHD. Post op complications with acute blood loss anemia requiring multiple blood products. Pt was stable for discharge to acute rehab.    #Debility  -Comprehensive Multidisciplinary Rehab Program  -Gait, ADL, Functional impairments  -PT/OT/ SLP 3 hours a day 5 days a week    #CAD with AR and MR  #Combined systolic/diastolic CHF  -s/p sternotomy  with CABG x2, AVR, and MVR 11/13  -c/w ASA/Statin/Metoprolol  -Monitor vitals    #Atrial flutter  -c/w Coumadin and Metoprolol  -c/w Amiodarone 200mg daily  -Continue Coumadin 3mg  -INR Goal 2-3    #ESRD  -HD MWF  -Nephrology on board  -avoid nephrotoxic agents  -Retacrit with HD    #Anemia  -Secondary to ESRD  -FOBT negative  -S/p 1unit PRBC  -Maintain Hb>8    #History of seizures  - c/w Keppra 750mg BID    #DVT prophylaxis:  - Coumadin  - SCDs

## 2020-12-05 NOTE — PROGRESS NOTE ADULT - SUBJECTIVE AND OBJECTIVE BOX
Patient is a 60y old  Male who presents with a chief complaint of Impairments: AVR  Impairment Codes: 09 Cardiac (04 Dec 2020 14:38)      SUBJECTIVE / OVERNIGHT EVENTS:  Pt seen and examined at bedside. No acute events overnight.  Pt denies cp, palpitations, sob, abd pain, N/V, fever, chills.    ROS:  All other review of systems negative    Allergies    penicillin (Unknown)    Intolerances        MEDICATIONS  (STANDING):  acetaminophen   Tablet .. 975 milliGRAM(s) Oral <User Schedule>  aMIOdarone    Tablet 200 milliGRAM(s) Oral daily  ascorbic acid 500 milliGRAM(s) Oral daily  aspirin enteric coated 81 milliGRAM(s) Oral daily  atorvastatin 40 milliGRAM(s) Oral at bedtime  epoetin yolanda-epbx (RETACRIT) Injectable 19940 Unit(s) IV Push <User Schedule>  folic acid 1 milliGRAM(s) Oral daily  gabapentin 100 milliGRAM(s) Oral at bedtime  influenza   Vaccine 0.5 milliLiter(s) IntraMuscular once  levETIRAcetam 750 milliGRAM(s) Oral two times a day  melatonin 6 milliGRAM(s) Oral at bedtime  methylphenidate 5 milliGRAM(s) Oral daily  metoprolol tartrate 25 milliGRAM(s) Oral every 12 hours  multivitamin 1 Tablet(s) Oral daily  nystatin Cream 1 Application(s) Topical two times a day  pantoprazole    Tablet 40 milliGRAM(s) Oral two times a day  QUEtiapine 25 milliGRAM(s) Oral at bedtime    MEDICATIONS  (PRN):  acetaminophen   Tablet .. 650 milliGRAM(s) Oral every 6 hours PRN Temp greater or equal to 38C (100.4F), Mild Pain (1 - 3)  ALBUTerol    90 MICROgram(s) HFA Inhaler 2 Puff(s) Inhalation every 6 hours PRN Shortness of Breath and/or Wheezing  benzocaine 15 mG/menthol 3.6 mG (Sugar-Free) Lozenge 1 Lozenge Oral two times a day PRN Sore Throat  guaiFENesin   Syrup  (Sugar-Free) 200 milliGRAM(s) Oral every 6 hours PRN Cough      Vital Signs Last 24 Hrs  T(C): 36.7 (04 Dec 2020 19:48), Max: 36.7 (04 Dec 2020 19:48)  T(F): 98 (04 Dec 2020 19:48), Max: 98 (04 Dec 2020 19:48)  HR: 92 (05 Dec 2020 05:47) (70 - 97)  BP: 135/77 (05 Dec 2020 05:47) (126/72 - 148/74)  BP(mean): --  RR: 15 (04 Dec 2020 19:48) (14 - 15)  SpO2: 97% (04 Dec 2020 19:48) (97% - 100%)  CAPILLARY BLOOD GLUCOSE        I&O's Summary    04 Dec 2020 07:01  -  05 Dec 2020 07:00  --------------------------------------------------------  IN: 0 mL / OUT: 2000 mL / NET: -2000 mL        PHYSICAL EXAM:  GENERAL: NAD, well-developed  CHEST/LUNG: Clear to auscultation bilaterally; No wheeze  HEART: Regular rate and rhythm; No murmurs, rubs, or gallops  ABDOMEN: Soft, Nontender, Nondistended; Bowel sounds present  EXTREMITIES:  2+ Peripheral Pulses, No clubbing, cyanosis. ACE Wrapped b/l LE   PSYCH: AAOx3  NEUROLOGY: non-focal  SKIN: Sternotomy site c/d/i. LLE Incision site c/d/i with staples. Right chest permacath    LABS:                        9.0    9.04  )-----------( 200      ( 05 Dec 2020 08:05 )             29.0     12-04    134<L>  |  101  |  68<H>  ----------------------------<  95  4.7   |  27  |  3.90<H>    Ca    8.0<L>      04 Dec 2020 10:30      PT/INR - ( 05 Dec 2020 08:05 )   PT: 27.9 sec;   INR: 2.40 ratio                   RADIOLOGY & ADDITIONAL TESTS:  Results Reviewed:   Imaging Personally Reviewed:  Electrocardiogram Personally Reviewed:    COORDINATION OF CARE:  Care Discussed with Consultants/Other Providers [Y/N]:  Prior or Outpatient Records Reviewed [Y/N]:

## 2020-12-06 LAB
INR BLD: 3.53 RATIO — HIGH (ref 0.88–1.16)
PROTHROM AB SERPL-ACNC: 40.2 SEC — HIGH (ref 10.6–13.6)

## 2020-12-06 PROCEDURE — 99232 SBSQ HOSP IP/OBS MODERATE 35: CPT

## 2020-12-06 RX ADMIN — LEVETIRACETAM 750 MILLIGRAM(S): 250 TABLET, FILM COATED ORAL at 17:34

## 2020-12-06 RX ADMIN — Medication 25 MILLIGRAM(S): at 05:45

## 2020-12-06 RX ADMIN — Medication 500 MILLIGRAM(S): at 13:15

## 2020-12-06 RX ADMIN — GABAPENTIN 100 MILLIGRAM(S): 400 CAPSULE ORAL at 21:14

## 2020-12-06 RX ADMIN — NYSTATIN CREAM 1 APPLICATION(S): 100000 CREAM TOPICAL at 17:35

## 2020-12-06 RX ADMIN — Medication 975 MILLIGRAM(S): at 21:45

## 2020-12-06 RX ADMIN — Medication 25 MILLIGRAM(S): at 17:34

## 2020-12-06 RX ADMIN — PANTOPRAZOLE SODIUM 40 MILLIGRAM(S): 20 TABLET, DELAYED RELEASE ORAL at 05:45

## 2020-12-06 RX ADMIN — ATORVASTATIN CALCIUM 40 MILLIGRAM(S): 80 TABLET, FILM COATED ORAL at 21:14

## 2020-12-06 RX ADMIN — Medication 1 TABLET(S): at 13:17

## 2020-12-06 RX ADMIN — Medication 5 MILLIGRAM(S): at 13:15

## 2020-12-06 RX ADMIN — NYSTATIN CREAM 1 APPLICATION(S): 100000 CREAM TOPICAL at 05:56

## 2020-12-06 RX ADMIN — Medication 200 MILLIGRAM(S): at 23:43

## 2020-12-06 RX ADMIN — Medication 6 MILLIGRAM(S): at 21:15

## 2020-12-06 RX ADMIN — QUETIAPINE FUMARATE 25 MILLIGRAM(S): 200 TABLET, FILM COATED ORAL at 21:15

## 2020-12-06 RX ADMIN — Medication 975 MILLIGRAM(S): at 21:14

## 2020-12-06 RX ADMIN — PANTOPRAZOLE SODIUM 40 MILLIGRAM(S): 20 TABLET, DELAYED RELEASE ORAL at 17:34

## 2020-12-06 RX ADMIN — AMIODARONE HYDROCHLORIDE 200 MILLIGRAM(S): 400 TABLET ORAL at 05:45

## 2020-12-06 RX ADMIN — Medication 81 MILLIGRAM(S): at 13:15

## 2020-12-06 RX ADMIN — Medication 1 MILLIGRAM(S): at 13:16

## 2020-12-06 RX ADMIN — LEVETIRACETAM 750 MILLIGRAM(S): 250 TABLET, FILM COATED ORAL at 05:45

## 2020-12-06 NOTE — PROGRESS NOTE ADULT - SUBJECTIVE AND OBJECTIVE BOX
Patient is a 60y old  Male who presents with a chief complaint of Impairments: AVR  Impairment Codes: 09 Cardiac (06 Dec 2020 09:34)      HPI:  JONATHAN GIBSON is a 61yo M with PMHx of CAD s/p PCI to LAD, aortic regurgitation, HTN, thoracic aortic dissection s/p emergent surgery c/b CVA with residual left-sided weakness, colostomy for bowel ischemia s/p reversal, and psoriasis on Humira, who presented to [facility] on 11/02/2020 with worsening SOB with associated chest pressure and LE edema. In the ED, had elevated pro-BNP >33k, SCr, acute anemia, and HTN emergency. Evaluated on admission by cardiology; symptoms improved following IV Lasix. Echocardiogram on admission showing EF 50-55%, enlarged LA, mod-sever MR, mod TR, mod-severe AR, and enlarged aortic root.     Patient evaluated by CT surgery for potential AVR/MVR; required CVVHD medical optimization. GI consulted for acute anemia; patient underwent EGD on 11/09 with no significant findings. Patient underwent sternotomy for CABG x2, AVR, and MVR on 11/13; post-op c/b acute blood loss anemia requiring multiple blood products, pressors, and inotropes. Patient was extubated on 11/16 and underwent permacath placement with IR on 11/20. Pressors and inotropes weaned off and transferred to floors on 11/28. Repeat TTE with doppler showing EF 40% with mod LV systolic dysfunction.    Patient was evaluated by PM&R and therapy for functional deficits and gait/ ADL impairments and recommended acute rehabilitation. Patient was medically optimized for discharge to [facility] on 11/30/2020.    Patient was seen and examined at the bedside on arrival. Denied HA, fever, chills, coughing, wheezing, SOB, or abdominal pain. Last BM was this morning. Appetite has returned, requesting dinner. Endorses some weakness and numbness on the left LE 2/2 residual deficits from prior stroke. Had some low back pain prior to arrival, but now resolved.  (30 Nov 2020 15:16)      PAST MEDICAL & SURGICAL HISTORY:  CVA (cerebral vascular accident)    CHF (congestive heart failure)    H/O colectomy    Aorta disorder        MEDICATIONS  (STANDING):  acetaminophen   Tablet .. 975 milliGRAM(s) Oral <User Schedule>  aMIOdarone    Tablet 200 milliGRAM(s) Oral daily  ascorbic acid 500 milliGRAM(s) Oral daily  aspirin enteric coated 81 milliGRAM(s) Oral daily  atorvastatin 40 milliGRAM(s) Oral at bedtime  epoetin yolanda-epbx (RETACRIT) Injectable 03794 Unit(s) IV Push <User Schedule>  folic acid 1 milliGRAM(s) Oral daily  gabapentin 100 milliGRAM(s) Oral at bedtime  influenza   Vaccine 0.5 milliLiter(s) IntraMuscular once  levETIRAcetam 750 milliGRAM(s) Oral two times a day  melatonin 6 milliGRAM(s) Oral at bedtime  methylphenidate 5 milliGRAM(s) Oral daily  metoprolol tartrate 25 milliGRAM(s) Oral every 12 hours  multivitamin 1 Tablet(s) Oral daily  nystatin Cream 1 Application(s) Topical two times a day  pantoprazole    Tablet 40 milliGRAM(s) Oral two times a day  QUEtiapine 25 milliGRAM(s) Oral at bedtime    MEDICATIONS  (PRN):  acetaminophen   Tablet .. 650 milliGRAM(s) Oral every 6 hours PRN Temp greater or equal to 38C (100.4F), Mild Pain (1 - 3)  ALBUTerol    90 MICROgram(s) HFA Inhaler 2 Puff(s) Inhalation every 6 hours PRN Shortness of Breath and/or Wheezing  benzocaine 15 mG/menthol 3.6 mG (Sugar-Free) Lozenge 1 Lozenge Oral two times a day PRN Sore Throat  guaiFENesin   Syrup  (Sugar-Free) 200 milliGRAM(s) Oral every 6 hours PRN Cough      Allergies    penicillin (Unknown)    Intolerances          VITALS  60y  Vital Signs Last 24 Hrs  T(C): 36.8 (06 Dec 2020 08:16), Max: 36.9 (05 Dec 2020 17:53)  T(F): 98.2 (06 Dec 2020 08:16), Max: 98.5 (05 Dec 2020 17:53)  HR: 77 (06 Dec 2020 08:16) (77 - 102)  BP: 138/83 (06 Dec 2020 08:16) (126/72 - 138/83)  BP(mean): --  RR: 16 (06 Dec 2020 08:16) (16 - 17)  SpO2: 97% (06 Dec 2020 08:16) (96% - 97%)  Daily     Daily         RECENT LABS:                          9.0    9.04  )-----------( 200      ( 05 Dec 2020 08:05 )             29.0     12-04    134<L>  |  101  |  68<H>  ----------------------------<  95  4.7   |  27  |  3.90<H>    Ca    8.0<L>      04 Dec 2020 10:30        PT/INR - ( 06 Dec 2020 06:55 )   PT: 40.2 sec;   INR: 3.53 ratio                 CAPILLARY BLOOD GLUCOSE        Review of Systems:   · Additional ROS	Patient stable, cough improved at night with robitussin. No BRBPR, no hematuria. no N/V. IN R supratherapeutic, coumadin to be held tonight    some sternal discomfort with cough, mobility    Physical Exam:   · Constitutional	detailed exam  · Constitutional Comments	alert, mood fair, O x 3  · Respiratory	detailed exam  · Respiratory Comments	reduced BS bases, reduced inspiratory effort, no wheezing  sternal incision intact, no redness, no warmth  · Cardiovascular	detailed exam  · Cardiovascular Details	regular rate and rhythm  · Cardiovascular Comments	incision healing well, no swelling or redness, dry, intact,  · Gastrointestinal	detailed exam  · GI Normal	soft; nontender; bowel sounds normal  · Extremities	detailed exam  · Extremities Comments	calves soft, NT no erythema or warmth  LLE incision healing well

## 2020-12-06 NOTE — PROGRESS NOTE ADULT - ASSESSMENT
61yo M with PMHx of CAD s/p PCI to LAD, aortic regurgitation, HTN, thoracic aortic dissection s/p emergent surgery c/b CVA with residual left-sided weakness, colostomy for bowel ischemia s/p reversal, and psoriasis on Humira, who presented to [facility] on 11/02/2020 with worsening SOB with associated chest pressure and LE edema; found to have mod-severe MR and AR, now s/p sternotomy for CABG x2, AVR, and MVR. Hospital Course complicated by SANTOS now requiring HD, acute blood loss anemia s/p multiple PRBCs, and atrial flutter. Admitted for multidisciplinary rehab program.    #Comprehensive Multidisciplinary Rehab Program:  - PT/OT/ 3 hours a day 5 days a week    #CAD with AR and MR, s/p sternotomy  with CABG x2, AVR, and MVR  - repeat TTE prior to discharge on 11/23 with EF 40%  -  INR 2.40 12/5 (increased from 1.7, 1.87). Coumadin reduced from 5 to 3 mg 12/5.  - INR 12/6=3.53 supratherapeutic. Will HOLD coumadin 12/6 and monitor.  - c/w ASA 81mg daily  - c/w atorvastatin 40mg qhs  - c/w Metoprolol tartrate 50mg q8h  - continue weaning Ritalin: 5mg daily for 3 days, then 5mg q48h, then DC    #Anemia:  - transfuse if hgb <8 during HD  -continue retacrit  -Hgb 7.5 12/4. Currently 9 12/5 s/p transfusion  -Fe 29, TIBC 227, ferritin 1099    #Atrial flutter:  - c/w Coumadin and Metoprolol  - c/w Amiodarone 200mg daily    #ATN:-   s/p Permcath by IR on 11/20  -  HD MWF  - Retacrit with HD    #History of seizures:-   c/w Keppra 750mg BID    #HLD:  - c/w Atorvastatin    #Mood:  - c/w Seroquel 25mg qhs    #Sleep:   Melatonin PRN  - quetiapine     #Pain:-   Tylenol PRN,   - c/w gabapentin 100mg qhs    #GI/Bowel:  - Senna 2 tabs daily  - GI ppx: Protonix 40mg daily    #Diet:   Regular, DASH/TLC with fluid restriction 1.5L  - Nutrition to follow    #Skin/ Pressure Injury Prevention:  - Incisions: sternotomy and left LE vein harvest incisions  - Turn Q2hrs in bed while awake, OOB to Chair, PT/OT    DVT prophylaxis:  - Coumadin. Held 12/6 for INR 3.53

## 2020-12-06 NOTE — PROGRESS NOTE ADULT - SUBJECTIVE AND OBJECTIVE BOX
Patient is a 60y old  Male who presents with a chief complaint of Impairments: AVR  Impairment Codes: 09 Cardiac (05 Dec 2020 10:34)      SUBJECTIVE / OVERNIGHT EVENTS:  Pt seen and examined at bedside. No acute events overnight.  Pt denies cp, palpitations, sob, abd pain, N/V, fever, chills.    ROS:  All other review of systems negative    Allergies    penicillin (Unknown)    Intolerances        MEDICATIONS  (STANDING):  acetaminophen   Tablet .. 975 milliGRAM(s) Oral <User Schedule>  aMIOdarone    Tablet 200 milliGRAM(s) Oral daily  ascorbic acid 500 milliGRAM(s) Oral daily  aspirin enteric coated 81 milliGRAM(s) Oral daily  atorvastatin 40 milliGRAM(s) Oral at bedtime  epoetin yolanda-epbx (RETACRIT) Injectable 33500 Unit(s) IV Push <User Schedule>  folic acid 1 milliGRAM(s) Oral daily  gabapentin 100 milliGRAM(s) Oral at bedtime  influenza   Vaccine 0.5 milliLiter(s) IntraMuscular once  levETIRAcetam 750 milliGRAM(s) Oral two times a day  melatonin 6 milliGRAM(s) Oral at bedtime  methylphenidate 5 milliGRAM(s) Oral daily  metoprolol tartrate 25 milliGRAM(s) Oral every 12 hours  multivitamin 1 Tablet(s) Oral daily  nystatin Cream 1 Application(s) Topical two times a day  pantoprazole    Tablet 40 milliGRAM(s) Oral two times a day  QUEtiapine 25 milliGRAM(s) Oral at bedtime    MEDICATIONS  (PRN):  acetaminophen   Tablet .. 650 milliGRAM(s) Oral every 6 hours PRN Temp greater or equal to 38C (100.4F), Mild Pain (1 - 3)  ALBUTerol    90 MICROgram(s) HFA Inhaler 2 Puff(s) Inhalation every 6 hours PRN Shortness of Breath and/or Wheezing  benzocaine 15 mG/menthol 3.6 mG (Sugar-Free) Lozenge 1 Lozenge Oral two times a day PRN Sore Throat  guaiFENesin   Syrup  (Sugar-Free) 200 milliGRAM(s) Oral every 6 hours PRN Cough      Vital Signs Last 24 Hrs  T(C): 36.8 (06 Dec 2020 08:16), Max: 36.9 (05 Dec 2020 17:53)  T(F): 98.2 (06 Dec 2020 08:16), Max: 98.5 (05 Dec 2020 17:53)  HR: 77 (06 Dec 2020 08:16) (77 - 102)  BP: 138/83 (06 Dec 2020 08:16) (126/72 - 138/83)  BP(mean): --  RR: 16 (06 Dec 2020 08:16) (16 - 17)  SpO2: 97% (06 Dec 2020 08:16) (96% - 97%)  CAPILLARY BLOOD GLUCOSE        I&O's Summary      PHYSICAL EXAM:  GENERAL: NAD, well-developed  CHEST/LUNG: Clear to auscultation bilaterally; No wheeze  HEART: Regular rate and rhythm; No murmurs, rubs, or gallops  ABDOMEN: Soft, Nontender, Nondistended; Bowel sounds present  EXTREMITIES:  2+ Peripheral Pulses, No clubbing, cyanosis. ACE Wrapped b/l LE   PSYCH: AAOx3  NEUROLOGY: non-focal  SKIN: Sternotomy site c/d/i. LLE Incision site c/d/i with staples. Right chest permacath    LABS:                        9.0    9.04  )-----------( 200      ( 05 Dec 2020 08:05 )             29.0     12-04    134<L>  |  101  |  68<H>  ----------------------------<  95  4.7   |  27  |  3.90<H>    Ca    8.0<L>      04 Dec 2020 10:30      PT/INR - ( 06 Dec 2020 06:55 )   PT: 40.2 sec;   INR: 3.53 ratio                   RADIOLOGY & ADDITIONAL TESTS:  Results Reviewed:   Imaging Personally Reviewed:  Electrocardiogram Personally Reviewed:    COORDINATION OF CARE:  Care Discussed with Consultants/Other Providers [Y/N]:  Prior or Outpatient Records Reviewed [Y/N]:

## 2020-12-06 NOTE — PROGRESS NOTE ADULT - ASSESSMENT
59yo Male with hx of CAD s/p PCI to LAD, aortic regurgitation, HTN, thoracic aortic dissection s/p emergent surgery c/b CVA with residual left-sided weakness, colostomy for bowel ischemia s/p reversal, and psoriasis on Humira, presented to [facility] for acute rehab from [facility] with complaints of cp a/w SOB and LE swelling, noted to have enlarged LA, mod-sever MR, mod TR, mod-severe AR, and enlarged aortic root. Pt s/p CABG x2, AVR, and MVR on 11/13. Pt was medically optimized with CVVHD. Post op complications with acute blood loss anemia requiring multiple blood products. Pt was stable for discharge to acute rehab.    #Debility  -Comprehensive Multidisciplinary Rehab Program  -Gait, ADL, Functional impairments  -PT/OT/ SLP 3 hours a day 5 days a week    #CAD with AR and MR  #Combined systolic/diastolic CHF  -s/p sternotomy  with CABG x2, AVR, and MVR 11/13  -c/w ASA/Statin/Metoprolol  -Monitor vitals    #Atrial flutter  -c/w Coumadin and Metoprolol  -c/w Amiodarone 200mg daily  -Recommend to hold Coumadin tonight as supratherapeutic  -INR Goal 2-3    #ESRD  -HD MWF  -Nephrology on board  -avoid nephrotoxic agents  -Retacrit with HD    #Anemia  -Secondary to ESRD  -FOBT negative  -Maintain Hb>8    #History of seizures  - c/w Keppra 750mg BID    #DVT prophylaxis:  - Coumadin  - SCDs

## 2020-12-07 LAB
% ALBUMIN: 40.5 % — SIGNIFICANT CHANGE UP
% ALPHA 1: 10 % — SIGNIFICANT CHANGE UP
% ALPHA 2: 15.1 % — SIGNIFICANT CHANGE UP
% BETA: 17 % — SIGNIFICANT CHANGE UP
% GAMMA: 17.4 % — SIGNIFICANT CHANGE UP
ALBUMIN SERPL ELPH-MCNC: 2.4 G/DL — LOW (ref 3.6–5.5)
ALBUMIN/GLOB SERPL ELPH: 0.7 RATIO — SIGNIFICANT CHANGE UP
ALPHA1 GLOB SERPL ELPH-MCNC: 0.6 G/DL — HIGH (ref 0.1–0.4)
ALPHA2 GLOB SERPL ELPH-MCNC: 0.9 G/DL — SIGNIFICANT CHANGE UP (ref 0.5–1)
ANION GAP SERPL CALC-SCNC: 13 MMOL/L — SIGNIFICANT CHANGE UP (ref 5–17)
B-GLOBULIN SERPL ELPH-MCNC: 1 G/DL — SIGNIFICANT CHANGE UP (ref 0.5–1)
BUN SERPL-MCNC: 84 MG/DL — HIGH (ref 7–23)
CALCIUM SERPL-MCNC: 8.1 MG/DL — LOW (ref 8.4–10.5)
CHLORIDE SERPL-SCNC: 105 MMOL/L — SIGNIFICANT CHANGE UP (ref 96–108)
CO2 SERPL-SCNC: 25 MMOL/L — SIGNIFICANT CHANGE UP (ref 22–31)
CREAT SERPL-MCNC: 3.62 MG/DL — HIGH (ref 0.5–1.3)
GAMMA GLOBULIN: 1 G/DL — SIGNIFICANT CHANGE UP (ref 0.6–1.6)
GLUCOSE SERPL-MCNC: 118 MG/DL — HIGH (ref 70–99)
HCT VFR BLD CALC: 27.1 % — LOW (ref 39–50)
HGB BLD-MCNC: 8.7 G/DL — LOW (ref 13–17)
INR BLD: 4.31 RATIO — HIGH (ref 0.88–1.16)
MCHC RBC-ENTMCNC: 30.2 PG — SIGNIFICANT CHANGE UP (ref 27–34)
MCHC RBC-ENTMCNC: 32.1 GM/DL — SIGNIFICANT CHANGE UP (ref 32–36)
MCV RBC AUTO: 94.1 FL — SIGNIFICANT CHANGE UP (ref 80–100)
NRBC # BLD: 0 /100 WBCS — SIGNIFICANT CHANGE UP (ref 0–0)
PHOSPHATE SERPL-MCNC: 4.8 MG/DL — HIGH (ref 2.5–4.5)
PLATELET # BLD AUTO: 246 K/UL — SIGNIFICANT CHANGE UP (ref 150–400)
POTASSIUM SERPL-MCNC: 3.5 MMOL/L — SIGNIFICANT CHANGE UP (ref 3.5–5.3)
POTASSIUM SERPL-SCNC: 3.5 MMOL/L — SIGNIFICANT CHANGE UP (ref 3.5–5.3)
PROT PATTERN SERPL ELPH-IMP: SIGNIFICANT CHANGE UP
PROT SERPL-MCNC: 6 G/DL — SIGNIFICANT CHANGE UP (ref 6–8.3)
PROT SERPL-MCNC: 6 G/DL — SIGNIFICANT CHANGE UP (ref 6–8.3)
PROTHROM AB SERPL-ACNC: 48.6 SEC — HIGH (ref 10.6–13.6)
RBC # BLD: 2.88 M/UL — LOW (ref 4.2–5.8)
RBC # FLD: 16.2 % — HIGH (ref 10.3–14.5)
SARS-COV-2 RNA SPEC QL NAA+PROBE: SIGNIFICANT CHANGE UP
SODIUM SERPL-SCNC: 143 MMOL/L — SIGNIFICANT CHANGE UP (ref 135–145)
WBC # BLD: 8.89 K/UL — SIGNIFICANT CHANGE UP (ref 3.8–10.5)
WBC # FLD AUTO: 8.89 K/UL — SIGNIFICANT CHANGE UP (ref 3.8–10.5)

## 2020-12-07 PROCEDURE — 99232 SBSQ HOSP IP/OBS MODERATE 35: CPT

## 2020-12-07 PROCEDURE — 93010 ELECTROCARDIOGRAM REPORT: CPT

## 2020-12-07 PROCEDURE — 99232 SBSQ HOSP IP/OBS MODERATE 35: CPT | Mod: GC

## 2020-12-07 RX ORDER — LIDOCAINE 4 G/100G
1 CREAM TOPICAL DAILY
Refills: 0 | Status: DISCONTINUED | OUTPATIENT
Start: 2020-12-07 | End: 2020-12-14

## 2020-12-07 RX ADMIN — IRON SUCROSE 210 MILLIGRAM(S): 20 INJECTION, SOLUTION INTRAVENOUS at 16:37

## 2020-12-07 RX ADMIN — NYSTATIN CREAM 1 APPLICATION(S): 100000 CREAM TOPICAL at 17:48

## 2020-12-07 RX ADMIN — QUETIAPINE FUMARATE 25 MILLIGRAM(S): 200 TABLET, FILM COATED ORAL at 22:27

## 2020-12-07 RX ADMIN — Medication 25 MILLIGRAM(S): at 05:36

## 2020-12-07 RX ADMIN — Medication 975 MILLIGRAM(S): at 22:29

## 2020-12-07 RX ADMIN — ERYTHROPOIETIN 10000 UNIT(S): 10000 INJECTION, SOLUTION INTRAVENOUS; SUBCUTANEOUS at 16:37

## 2020-12-07 RX ADMIN — Medication 81 MILLIGRAM(S): at 12:11

## 2020-12-07 RX ADMIN — Medication 25 MILLIGRAM(S): at 17:47

## 2020-12-07 RX ADMIN — GABAPENTIN 100 MILLIGRAM(S): 400 CAPSULE ORAL at 22:27

## 2020-12-07 RX ADMIN — LEVETIRACETAM 750 MILLIGRAM(S): 250 TABLET, FILM COATED ORAL at 17:46

## 2020-12-07 RX ADMIN — AMIODARONE HYDROCHLORIDE 200 MILLIGRAM(S): 400 TABLET ORAL at 05:35

## 2020-12-07 RX ADMIN — ATORVASTATIN CALCIUM 40 MILLIGRAM(S): 80 TABLET, FILM COATED ORAL at 22:27

## 2020-12-07 RX ADMIN — PANTOPRAZOLE SODIUM 40 MILLIGRAM(S): 20 TABLET, DELAYED RELEASE ORAL at 05:37

## 2020-12-07 RX ADMIN — Medication 1 TABLET(S): at 12:10

## 2020-12-07 RX ADMIN — Medication 6 MILLIGRAM(S): at 22:30

## 2020-12-07 RX ADMIN — Medication 1 MILLIGRAM(S): at 12:11

## 2020-12-07 RX ADMIN — Medication 975 MILLIGRAM(S): at 23:00

## 2020-12-07 RX ADMIN — Medication 200 MILLIGRAM(S): at 04:46

## 2020-12-07 RX ADMIN — NYSTATIN CREAM 1 APPLICATION(S): 100000 CREAM TOPICAL at 05:37

## 2020-12-07 RX ADMIN — PANTOPRAZOLE SODIUM 40 MILLIGRAM(S): 20 TABLET, DELAYED RELEASE ORAL at 17:47

## 2020-12-07 RX ADMIN — Medication 500 MILLIGRAM(S): at 12:10

## 2020-12-07 RX ADMIN — LEVETIRACETAM 750 MILLIGRAM(S): 250 TABLET, FILM COATED ORAL at 05:35

## 2020-12-07 NOTE — PROGRESS NOTE ADULT - SUBJECTIVE AND OBJECTIVE BOX
No distress    Vital Signs Last 24 Hrs  T(C): 36.6 (12-07-20 @ 09:00), Max: 36.6 (12-07-20 @ 09:00)  T(F): 97.8 (12-07-20 @ 09:00), Max: 97.8 (12-07-20 @ 09:00)  HR: 96 (12-07-20 @ 09:00) (80 - 96)  BP: 133/77 (12-07-20 @ 09:00) (130/80 - 153/88)  RR: 15 (12-07-20 @ 09:00) (15 - 18)  SpO2: 98% (12-07-20 @ 09:00) (96% - 98%)    s1s2  b/l air entry  soft  sm edema                                              8.7    8.89  )-----------( 246      ( 07 Dec 2020 14:28 )             27.1     07 Dec 2020 14:28    143    |  105    |  84     ----------------------------<  118    3.5     |  25     |  3.62     Ca    8.1        07 Dec 2020 14:28  Phos  4.8       07 Dec 2020 14:28    PT/INR - ( 07 Dec 2020 05:50 )   PT: 48.6 sec;   INR: 4.31 ratio      acetaminophen   Tablet .. 650 milliGRAM(s) Oral every 6 hours PRN  acetaminophen   Tablet .. 975 milliGRAM(s) Oral <User Schedule>  ALBUTerol    90 MICROgram(s) HFA Inhaler 2 Puff(s) Inhalation every 6 hours PRN  aMIOdarone    Tablet 200 milliGRAM(s) Oral daily  ascorbic acid 500 milliGRAM(s) Oral daily  aspirin enteric coated 81 milliGRAM(s) Oral daily  atorvastatin 40 milliGRAM(s) Oral at bedtime  benzocaine 15 mG/menthol 3.6 mG (Sugar-Free) Lozenge 1 Lozenge Oral two times a day PRN  epoetin yolanda-epbx (RETACRIT) Injectable 00984 Unit(s) IV Push <User Schedule>  folic acid 1 milliGRAM(s) Oral daily  gabapentin 100 milliGRAM(s) Oral at bedtime  guaiFENesin   Syrup  (Sugar-Free) 200 milliGRAM(s) Oral every 6 hours PRN  influenza   Vaccine 0.5 milliLiter(s) IntraMuscular once  iron sucrose IVPB 100 milliGRAM(s) IV Intermittent every 7 days  levETIRAcetam 750 milliGRAM(s) Oral two times a day  lidocaine   Patch 1 Patch Transdermal daily  melatonin 6 milliGRAM(s) Oral at bedtime  metoprolol tartrate 25 milliGRAM(s) Oral every 12 hours  multivitamin 1 Tablet(s) Oral daily  nystatin Cream 1 Application(s) Topical two times a day  pantoprazole    Tablet 40 milliGRAM(s) Oral two times a day  QUEtiapine 25 milliGRAM(s) Oral at bedtime    A/P:    S/p CABG x 2, AVR, MVR - 11/13/20  Complicated hospital course as per HPI  Now on HD MWF  TMP as able w/HD  Epogen w/HD 3 x week  Will f/u renal fx, UO  Rehab    [phone]

## 2020-12-07 NOTE — PROGRESS NOTE ADULT - SUBJECTIVE AND OBJECTIVE BOX
DAILY PROGRESS NOTE:  HPI:  JONATHAN GIBSON is a 59yo M with PMHx of CAD s/p PCI to LAD, aortic regurgitation, HTN, thoracic aortic dissection s/p emergent surgery c/b CVA with residual left-sided weakness, colostomy for bowel ischemia s/p reversal, and psoriasis on Humira, who presented to [facility] on 11/02/2020 with worsening SOB with associated chest pressure and LE edema. In the ED, had elevated pro-BNP >33k, SCr, acute anemia, and HTN emergency. Evaluated on admission by cardiology; symptoms improved following IV Lasix. Echocardiogram on admission showing EF 50-55%, enlarged LA, mod-sever MR, mod TR, mod-severe AR, and enlarged aortic root.     Patient evaluated by CT surgery for potential AVR/MVR; required CVVHD medical optimization. GI consulted for acute anemia; patient underwent EGD on 11/09 with no significant findings. Patient underwent sternotomy for CABG x2, AVR, and MVR on 11/13; post-op c/b acute blood loss anemia requiring multiple blood products, pressors, and inotropes. Patient was extubated on 11/16 and underwent permacath placement with IR on 11/20. Pressors and inotropes weaned off and transferred to floors on 11/28. Repeat TTE with doppler showing EF 40% with mod LV systolic dysfunction.    Patient was evaluated by PM&R and therapy for functional deficits and gait/ ADL impairments and recommended acute rehabilitation. Patient was medically optimized for discharge to [facility] on 11/30/2020.    Patient was seen and examined at the bedside on arrival. Denied HA, fever, chills, coughing, wheezing, SOB, or abdominal pain. Last BM was this morning. Appetite has returned, requesting dinner. Endorses some weakness and numbness on the left LE 2/2 residual deficits from prior stroke. Had some low back pain prior to arrival, but now resolved.  (30 Nov 2020 15:16)      Subjective:  Patient was seen and examined at the bedside this AM. No acute overnight events. Having trouble sleeping; stays up all night. He is easily awoken by staff members and roommates and has trouble falling back asleep. States Seroquel seems to help calm him down a little bit. Also endorsing right upper back pain that keeps him up at night, described as "knot-like" and tender, under the right shoulder blades. Endorses LBP during HD; states he would feel better if he could sit in a recliner, instead. Denied fever, chills, CP, palpitations, SOB, or abdominal pain; had BM this morning.      Physical Exam:  Vital Signs Last 24 Hrs  T(C): 36.6 (07 Dec 2020 09:00), Max: 36.6 (07 Dec 2020 09:00)  T(F): 97.8 (07 Dec 2020 09:00), Max: 97.8 (07 Dec 2020 09:00)  HR: 96 (07 Dec 2020 09:00) (80 - 96)  BP: 133/77 (07 Dec 2020 09:00) (130/80 - 153/88)  RR: 15 (07 Dec 2020 09:00) (15 - 18)  SpO2: 98% (07 Dec 2020 09:00) (96% - 98%)      Constitutional - NAD, Comfortable  Chest - CTAB  Cardiovascular - RRR  Abdomen - BS+, Soft, NTND  Extremities -LE edema + -bilateral. Weakness to abduction in 5th digit on the right hand.  Neurologic Exam -                    Cognitive - Awake, Alert, AAO to self, place, date, year, situation     No facial weakness     Motor left sided weakness from prior stroke      Psychiatric - Mood stable, Affect WNL  Skin:  Sternal incision from sternotomy, no staples in place; appears c/d/i. Left LE incision from vein harvesting, staples in place mild erythema surround incision; appears c/d/i. Right inner thigh incision, as well with staples in place; appears c/d/i. Stage 1 sacral lesion  Lines: Right IJ Permacath on the right chest wall. Midline in right medial arm      Recent Labs/Imaging:  PT/INR - ( 07 Dec 2020 05:50 )   PT: 48.6 sec;   INR: 4.31 ratio         Medications:  acetaminophen   Tablet .. 975 milliGRAM(s) Oral <User Schedule>  acetaminophen   Tablet .. 650 milliGRAM(s) Oral every 6 hours PRN  ALBUTerol    90 MICROgram(s) HFA Inhaler 2 Puff(s) Inhalation every 6 hours PRN  aMIOdarone    Tablet 200 milliGRAM(s) Oral daily  ascorbic acid 500 milliGRAM(s) Oral daily  aspirin enteric coated 81 milliGRAM(s) Oral daily  atorvastatin 40 milliGRAM(s) Oral at bedtime  benzocaine 15 mG/menthol 3.6 mG (Sugar-Free) Lozenge 1 Lozenge Oral two times a day PRN  epoetin yolanda-epbx (RETACRIT) Injectable 24968 Unit(s) IV Push <User Schedule>  folic acid 1 milliGRAM(s) Oral daily  gabapentin 100 milliGRAM(s) Oral at bedtime  guaiFENesin   Syrup  (Sugar-Free) 200 milliGRAM(s) Oral every 6 hours PRN  influenza   Vaccine 0.5 milliLiter(s) IntraMuscular once  iron sucrose IVPB 100 milliGRAM(s) IV Intermittent every 7 days  levETIRAcetam 750 milliGRAM(s) Oral two times a day  melatonin 6 milliGRAM(s) Oral at bedtime  metoprolol tartrate 25 milliGRAM(s) Oral every 12 hours  multivitamin 1 Tablet(s) Oral daily  nystatin Cream 1 Application(s) Topical two times a day  pantoprazole    Tablet 40 milliGRAM(s) Oral two times a day  QUEtiapine 25 milliGRAM(s) Oral at bedtime DAILY PROGRESS NOTE:  HPI:  JONATHAN GIBSON is a 59yo M with PMHx of CAD s/p PCI to LAD, aortic regurgitation, HTN, thoracic aortic dissection s/p emergent surgery c/b CVA with residual left-sided weakness, colostomy for bowel ischemia s/p reversal, and psoriasis on Humira, who presented to [facility] on 11/02/2020 with worsening SOB with associated chest pressure and LE edema. In the ED, had elevated pro-BNP >33k, SCr, acute anemia, and HTN emergency. Evaluated on admission by cardiology; symptoms improved following IV Lasix. Echocardiogram on admission showing EF 50-55%, enlarged LA, mod-sever MR, mod TR, mod-severe AR, and enlarged aortic root.     Patient evaluated by CT surgery for potential AVR/MVR; required CVVHD medical optimization. GI consulted for acute anemia; patient underwent EGD on 11/09 with no significant findings. Patient underwent sternotomy for CABG x2, AVR, and MVR on 11/13; post-op c/b acute blood loss anemia requiring multiple blood products, pressors, and inotropes. Patient was extubated on 11/16 and underwent permacath placement with IR on 11/20. Pressors and inotropes weaned off and transferred to floors on 11/28. Repeat TTE with doppler showing EF 40% with mod LV systolic dysfunction.    Patient was evaluated by PM&R and therapy for functional deficits and gait/ ADL impairments and recommended acute rehabilitation. Patient was medically optimized for discharge to [facility] on 11/30/2020.    Patient was seen and examined at the bedside on arrival. Denied HA, fever, chills, coughing, wheezing, SOB, or abdominal pain. Last BM was this morning. Appetite has returned, requesting dinner. Endorses some weakness and numbness on the left LE 2/2 residual deficits from prior stroke. Had some low back pain prior to arrival, but now resolved.  (30 Nov 2020 15:16)      Subjective:  Patient was seen and examined at the bedside this AM. No acute overnight events. Having trouble sleeping; stays up all night. He is easily awoken by staff members and roommates and has trouble falling back asleep. States Seroquel seems to help calm him down a little bit. Also endorsing right upper back pain that keeps him up at night, described as "knot-like" and tender, under the right shoulder blades. Endorses LBP during HD; states he would feel better if he could sit in a recliner, instead. Denied fever, chills, CP, palpitations, SOB, or abdominal pain; had BM this morning.      Physical Exam:  Vital Signs Last 24 Hrs  T(C): 36.6 (07 Dec 2020 09:00), Max: 36.6 (07 Dec 2020 09:00)  T(F): 97.8 (07 Dec 2020 09:00), Max: 97.8 (07 Dec 2020 09:00)  HR: 96 (07 Dec 2020 09:00) (80 - 96)  BP: 133/77 (07 Dec 2020 09:00) (130/80 - 153/88)  RR: 15 (07 Dec 2020 09:00) (15 - 18)  SpO2: 98% (07 Dec 2020 09:00) (96% - 98%)      Constitutional - NAD, Comfortable  Chest - CTAB  Cardiovascular - RRR  Abdomen - BS+, Soft, NTND  Extremities -LE edema + -bilateral. Weakness to abduction in 5th digit on the right hand. Right-sided back pain, TTP under shoulder blade  Neurologic Exam -                    Cognitive - Awake, Alert, AAO to self, place, date, year, situation     No facial weakness     Motor left sided weakness from prior stroke      Psychiatric - Mood stable, Affect WNL  Skin:  Sternal incision from sternotomy, no staples in place; appears c/d/i. Left LE incision from vein harvesting, staples in place mild erythema surround incision; appears c/d/i. Right inner thigh incision, as well with staples in place; appears c/d/i. Stage 1 sacral lesion  Lines: Right IJ Permacath on the right chest wall. Midline in right medial arm      Recent Labs/Imaging:  PT/INR - ( 07 Dec 2020 05:50 )   PT: 48.6 sec;   INR: 4.31 ratio         Medications:  acetaminophen   Tablet .. 975 milliGRAM(s) Oral <User Schedule>  acetaminophen   Tablet .. 650 milliGRAM(s) Oral every 6 hours PRN  ALBUTerol    90 MICROgram(s) HFA Inhaler 2 Puff(s) Inhalation every 6 hours PRN  aMIOdarone    Tablet 200 milliGRAM(s) Oral daily  ascorbic acid 500 milliGRAM(s) Oral daily  aspirin enteric coated 81 milliGRAM(s) Oral daily  atorvastatin 40 milliGRAM(s) Oral at bedtime  benzocaine 15 mG/menthol 3.6 mG (Sugar-Free) Lozenge 1 Lozenge Oral two times a day PRN  epoetin yolanda-epbx (RETACRIT) Injectable 99955 Unit(s) IV Push <User Schedule>  folic acid 1 milliGRAM(s) Oral daily  gabapentin 100 milliGRAM(s) Oral at bedtime  guaiFENesin   Syrup  (Sugar-Free) 200 milliGRAM(s) Oral every 6 hours PRN  influenza   Vaccine 0.5 milliLiter(s) IntraMuscular once  iron sucrose IVPB 100 milliGRAM(s) IV Intermittent every 7 days  levETIRAcetam 750 milliGRAM(s) Oral two times a day  melatonin 6 milliGRAM(s) Oral at bedtime  metoprolol tartrate 25 milliGRAM(s) Oral every 12 hours  multivitamin 1 Tablet(s) Oral daily  nystatin Cream 1 Application(s) Topical two times a day  pantoprazole    Tablet 40 milliGRAM(s) Oral two times a day  QUEtiapine 25 milliGRAM(s) Oral at bedtime

## 2020-12-07 NOTE — PROGRESS NOTE ADULT - ASSESSMENT
60M with PMH CAD s/p PCI to LAD, aortic regurgitation, HTN, thoracic aortic dissection s/p emergent surgery c/b CVA with residual left-sided weakness, colostomy for bowel ischemia s/p reversal, and psoriasis on Humira, presented to [facility] for acute rehab. Post op complications with acute blood loss anemia requiring multiple blood products.    #CAD with AR and MR   #Combined systolic/diastolic CHF  #Debility  -Continue comprehensive rehab program  -s/p sternotomy  with CABG x2, AVR, and MVR 11/13  -Continue Aspirin, Metoprolol, Lipitor    #Atrial flutter  -Continue rate control with Metoprolol, Amiodarone   -Continue AC with Coumadin.  -Supra therapeutic INR. Goal 2-3. Hold Coumadin tonight.     #ESRD on HD due to ATN  #Anemia  -HD MWF  -Nephrology on board  -Avoid nephrotoxic agents  -Venofer with HD, Retacrit   -Maintain Hb>8    #Seizure Disorder  -No recent seizures  -Continue Keppra BID     #Prophylactic Measure  -DVT prophylaxis: Coumadin. Hold tonight. Supra therapeutic

## 2020-12-07 NOTE — PROGRESS NOTE ADULT - ATTENDING COMMENTS
Chart reviewed.   1PRBC transfused on 12/4  Patient seen at bedside. Reports poor sleep due to several issues. Also would like to have HD in a chair as he reports Back pain after being in bed for prolonged periods.     2. LE much improved. Continue ace wraps  Incision - clean and healing well    3. Continue rehab program    4. Labs with elevated INR- hold coumadin Chart reviewed.   1PRBC transfused on 12/4  Patient seen at bedside. Reports poor sleep due to several issues. Also would like to have HD in a chair as he reports Back pain after being in bed for prolonged periods.     2. LE much improved. Continue ace wraps  Incision - clean and healing well    3. Continue rehab program. Discussed with OT about right hand numbness and difficulty with fine motor tasks    4. Labs with elevated INR- hold coumadin  Stool occult: negative    5. Sleep/mood ( reports anxiety) : Plan to increase seroquel after checking EKG     6.Remove RUE midline

## 2020-12-07 NOTE — PROGRESS NOTE ADULT - ASSESSMENT
59yo M with PMHx of CAD s/p PCI to LAD, aortic regurgitation, HTN, thoracic aortic dissection s/p emergent surgery c/b CVA with residual left-sided weakness, colostomy for bowel ischemia s/p reversal, and psoriasis on Humira, who presented to [facility] on 11/02/2020 with worsening SOB with associated chest pressure and LE edema; found to have mod-severe MR and AR, now s/p sternotomy for CABG x2, AVR, and MVR. Hospital Course complicated by SANTOS now requiring HD, acute blood loss anemia s/p multiple PRBCs, and atrial flutter. Admitted for multidisciplinary rehab program.    #Comprehensive Multidisciplinary Rehab Program:  - PT/OT/ 3 hours a day 5 days a week    #CAD with AR and MR, s/p sternotomy  with CABG x2, AVR, and MVR  - repeat TTE prior to discharge on 11/23 with EF 40%  - Daily INR; c/w Coumadin  - c/w ASA 81mg daily  - c/w atorvastatin 40mg qhs  - c/w Metoprolol tartrate 50mg q8h  - continue to wean Ritalin    #Anemia:  - s/p 1 units PRBC on 12/4 with HD    Atrial flutter:- c/w Coumadin and Metoprolol, - c/w Amiodarone 200mg daily    ATN:- s/p Permcath by IR on 11/20  - renal consult; HD MWF  - Retacrit with HD    History of seizures:- c/w Keppra 750mg BID    HLD:- c/w Atorvastatin    Mood:- c/w Seroquel 25mg qhs    Sleep: Melatonin PRN  - will increase Seroquel to 50mg qhs, if ECG in afternoon with QTc WNL    Pain:- Tylenol PRN - c/w gabapentin 100mg qhs  - Lidocaine patch daily for right thoracic back pain; will request massage with therapy    GI/Bowel:- Senna 2 tabs daily  - GI ppx: Protonix 40mg daily    /Bladder: Toileting schedule prn    #Diet: Regular, DASH/TLC with fluid restriction 1.5L- ? reason for fluid restriction   - Nutrition to follow    Skin/ Pressure Injury Prevention:- Incisions: sternotomy and left LE vein harvest incisions  - Turn Q2hrs in bed while awake, OOB to Chair, PT/OT    DVT prophylaxis:- Coumadin    Labs with leucocytosis: Improving    Hospitalist consult noted    Disp: Team conference 12/2/20   Goals of modified independent   TDD 12/15 59yo M with PMHx of CAD s/p PCI to LAD, aortic regurgitation, HTN, thoracic aortic dissection s/p emergent surgery c/b CVA with residual left-sided weakness, colostomy for bowel ischemia s/p reversal, and psoriasis on Humira, who presented to [facility] on 11/02/2020 with worsening SOB with associated chest pressure and LE edema; found to have mod-severe MR and AR, now s/p sternotomy for CABG x2, AVR, and MVR. Hospital Course complicated by SANTOS now requiring HD, acute blood loss anemia s/p multiple PRBCs, and atrial flutter. Admitted for multidisciplinary rehab program.    #Comprehensive Multidisciplinary Rehab Program:  - PT/OT/ 3 hours a day 5 days a week    #CAD with AR and MR, s/p sternotomy  with CABG x2, AVR, and MVR  - repeat TTE prior to discharge on 11/23 with EF 40%  - Daily INR; c/w Coumadin  - c/w ASA 81mg daily  - c/w atorvastatin 40mg qhs  - c/w Metoprolol tartrate 50mg q8h  - continue to wean Ritalin    #Anemia:  - s/p 1 units PRBC on 12/4 with HD  - continue to monitor CBC with HD    Atrial flutter:- c/w Coumadin and Metoprolol, - c/w Amiodarone 200mg daily    ATN:- s/p Permcath by IR on 11/20  - renal consult; HD MWF  - Retacrit with HD    History of seizures:- c/w Keppra 750mg BID    HLD:- c/w Atorvastatin    Mood:- c/w Seroquel 25mg qhs    Sleep: Melatonin PRN  - will increase Seroquel to 50mg qhs, if ECG in afternoon with QTc WNL    Pain:- Tylenol PRN - c/w gabapentin 100mg qhs  - Lidocaine patch daily for right thoracic back pain; will request massage with therapy    GI/Bowel:- Senna 2 tabs daily  - GI ppx: Protonix 40mg daily    /Bladder: Toileting schedule prn    #Diet: Regular, DASH/TLC with fluid restriction 1.5L- ? reason for fluid restriction   - Nutrition to follow    Skin/ Pressure Injury Prevention:- Incisions: sternotomy and left LE vein harvest incisions  - Turn Q2hrs in bed while awake, OOB to Chair, PT/OT    DVT prophylaxis:- Coumadin    Labs with leucocytosis: Improving    Hospitalist consult noted    Disp: Team conference 12/2/20   Goals of modified independent   TDD 12/15 59yo M with PMHx of CAD s/p PCI to LAD, aortic regurgitation, HTN, thoracic aortic dissection s/p emergent surgery c/b CVA with residual left-sided weakness, colostomy for bowel ischemia s/p reversal, and psoriasis on Humira, who presented to [facility] on 11/02/2020 with worsening SOB with associated chest pressure and LE edema; found to have mod-severe MR and AR, now s/p sternotomy for CABG x2, AVR, and MVR. Hospital Course complicated by SANTOS now requiring HD, acute blood loss anemia s/p multiple PRBCs, and atrial flutter. Admitted for multidisciplinary rehab program.    #Comprehensive Multidisciplinary Rehab Program:  - PT/OT/ 3 hours a day 5 days a week    #CAD with AR and MR, s/p sternotomy  with CABG x2, AVR, and MVR  - repeat TTE prior to discharge on 11/23 with EF 40%  - Daily INR; c/w Coumadin  - c/w ASA 81mg daily  - c/w atorvastatin 40mg qhs  - c/w Metoprolol tartrate 50mg q8h  - wean ritalin    #Anemia:  - s/p 1 units PRBC on 12/4 with HD  - continue to monitor CBC with HD    Atrial flutter:- c/w Coumadin and Metoprolol, - c/w Amiodarone 200mg daily    ATN:- s/p Permcath by IR on 11/20  - renal consult; HD MWF  - Retacrit with HD    History of seizures:- c/w Keppra 750mg BID    HLD:- c/w Atorvastatin    Mood:- c/w Seroquel 25mg qhs    Sleep: Melatonin PRN  - will increase Seroquel to 50mg qhs, if ECG in afternoon with QTc WNL    Pain:- Tylenol PRN - c/w gabapentin 100mg qhs  - Lidocaine patch daily for right thoracic back pain; will request massage with therapy    GI/Bowel:- Senna 2 tabs daily  - GI ppx: Protonix 40mg daily    /Bladder: Toileting schedule prn    #Diet: Regular, DASH/TLC with fluid restriction 1.5L- ? reason for fluid restriction   - Nutrition to follow    Skin/ Pressure Injury Prevention:- Incisions: sternotomy and left LE vein harvest incisions  - Turn Q2hrs in bed while awake, OOB to Chair, PT/OT    DVT prophylaxis:- Coumadin    Labs with leucocytosis: Improving    Hospitalist consult noted    Disp: Team conference 12/2/20   Goals of modified independent   TDD 12/15

## 2020-12-08 LAB
HBV SURFACE AB SER-ACNC: 6.5 MIU/ML — LOW
HBV SURFACE AG SER-ACNC: SIGNIFICANT CHANGE UP
HCV AB S/CO SERPL IA: 0.1 S/CO — SIGNIFICANT CHANGE UP (ref 0–0.99)
HCV AB SERPL-IMP: SIGNIFICANT CHANGE UP
INR BLD: 3.45 RATIO — HIGH (ref 0.88–1.16)
PROTHROM AB SERPL-ACNC: 39.4 SEC — HIGH (ref 10.6–13.6)

## 2020-12-08 PROCEDURE — 96116 NUBHVL XM PHYS/QHP 1ST HR: CPT

## 2020-12-08 PROCEDURE — 99232 SBSQ HOSP IP/OBS MODERATE 35: CPT

## 2020-12-08 PROCEDURE — 99232 SBSQ HOSP IP/OBS MODERATE 35: CPT | Mod: GC

## 2020-12-08 RX ORDER — TRAZODONE HCL 50 MG
25 TABLET ORAL AT BEDTIME
Refills: 0 | Status: DISCONTINUED | OUTPATIENT
Start: 2020-12-08 | End: 2020-12-11

## 2020-12-08 RX ADMIN — Medication 6 MILLIGRAM(S): at 21:37

## 2020-12-08 RX ADMIN — Medication 25 MILLIGRAM(S): at 18:04

## 2020-12-08 RX ADMIN — NYSTATIN CREAM 1 APPLICATION(S): 100000 CREAM TOPICAL at 18:09

## 2020-12-08 RX ADMIN — PANTOPRAZOLE SODIUM 40 MILLIGRAM(S): 20 TABLET, DELAYED RELEASE ORAL at 05:27

## 2020-12-08 RX ADMIN — LIDOCAINE 1 PATCH: 4 CREAM TOPICAL at 21:45

## 2020-12-08 RX ADMIN — LEVETIRACETAM 750 MILLIGRAM(S): 250 TABLET, FILM COATED ORAL at 18:03

## 2020-12-08 RX ADMIN — GABAPENTIN 100 MILLIGRAM(S): 400 CAPSULE ORAL at 21:37

## 2020-12-08 RX ADMIN — Medication 1 TABLET(S): at 12:09

## 2020-12-08 RX ADMIN — Medication 25 MILLIGRAM(S): at 21:38

## 2020-12-08 RX ADMIN — ATORVASTATIN CALCIUM 40 MILLIGRAM(S): 80 TABLET, FILM COATED ORAL at 21:37

## 2020-12-08 RX ADMIN — Medication 975 MILLIGRAM(S): at 22:15

## 2020-12-08 RX ADMIN — LIDOCAINE 1 PATCH: 4 CREAM TOPICAL at 18:22

## 2020-12-08 RX ADMIN — AMIODARONE HYDROCHLORIDE 200 MILLIGRAM(S): 400 TABLET ORAL at 05:26

## 2020-12-08 RX ADMIN — Medication 1 MILLIGRAM(S): at 12:09

## 2020-12-08 RX ADMIN — PANTOPRAZOLE SODIUM 40 MILLIGRAM(S): 20 TABLET, DELAYED RELEASE ORAL at 18:03

## 2020-12-08 RX ADMIN — Medication 975 MILLIGRAM(S): at 21:37

## 2020-12-08 RX ADMIN — NYSTATIN CREAM 1 APPLICATION(S): 100000 CREAM TOPICAL at 05:28

## 2020-12-08 RX ADMIN — LIDOCAINE 1 PATCH: 4 CREAM TOPICAL at 12:10

## 2020-12-08 RX ADMIN — Medication 25 MILLIGRAM(S): at 05:27

## 2020-12-08 RX ADMIN — Medication 500 MILLIGRAM(S): at 12:09

## 2020-12-08 RX ADMIN — LEVETIRACETAM 750 MILLIGRAM(S): 250 TABLET, FILM COATED ORAL at 05:26

## 2020-12-08 RX ADMIN — Medication 5 MILLIGRAM(S): at 05:28

## 2020-12-08 RX ADMIN — Medication 81 MILLIGRAM(S): at 12:10

## 2020-12-08 NOTE — PROGRESS NOTE ADULT - ASSESSMENT
61yo M with PMHx of CAD s/p PCI to LAD, aortic regurgitation, HTN, thoracic aortic dissection s/p emergent surgery c/b CVA with residual left-sided weakness, colostomy for bowel ischemia s/p reversal, and psoriasis on Humira, who presented to [facility] on 11/02/2020 with worsening SOB with associated chest pressure and LE edema; found to have mod-severe MR and AR, now s/p sternotomy for CABG x2, AVR, and MVR. Hospital Course complicated by SANTOS now requiring HD, acute blood loss anemia s/p multiple PRBCs, and atrial flutter. Admitted for multidisciplinary rehab program.      #Comprehensive Multidisciplinary Rehab Program:  - PT/OT/ 3 hours a day 5 days a week    #CAD with AR and MR, s/p sternotomy  with CABG x2, AVR, and MVR  - repeat TTE prior to discharge on 11/23 with EF 40%  - Daily INR; c/w Coumadin  - c/w ASA 81mg daily  - c/w atorvastatin 40mg qhs  - c/w Metoprolol tartrate 50mg q8h  - wean ritalin    #Anemia:  - s/p 1 units PRBC on 12/4 with HD  - continue to monitor CBC with HD    Atrial flutter:- c/w Coumadin and Metoprolol, - c/w Amiodarone 200mg daily    ATN:- s/p Permcath by IR on 11/20  - renal consult; HD MWF  - Retacrit with HD    History of seizures:- c/w Keppra 750mg BID    HLD:- c/w Atorvastatin    Mood:  - having trouble sleeping; endorsing anxiety  - will DC Seroquel and start Trazodone 25mg qhs for sleep  - psychology consult    Pain:- Tylenol PRN - c/w gabapentin 100mg qhs  - Lidocaine patch daily for right thoracic back pain; massage with therapy    GI/Bowel:- Senna 2 tabs daily  - GI ppx: Protonix 40mg daily    /Bladder: Toileting schedule prn    #Diet: Regular, DASH/TLC with fluid restriction 1.5L- ? reason for fluid restriction   - Nutrition to follow    Skin/ Pressure Injury Prevention:- Incisions: sternotomy and left LE vein harvest incisions  - Turn Q2hrs in bed while awake, OOB to Chair, PT/OT    DVT prophylaxis:- Coumadin    Labs with leucocytosis: Improving    Hospitalist consult noted    Disp: Interdisciplinary Team Rounds, 12/08/2020  - mod assist with bathing; min assist with transfers and stairs. Ambulates 60' with RW; mild cognitive deficit  - TDD 12/15; will need family training prior to discharge

## 2020-12-08 NOTE — PROGRESS NOTE ADULT - SUBJECTIVE AND OBJECTIVE BOX
DAILY PROGRESS NOTE:  HPI:  JONATHAN GIBSON is a 61yo M with PMHx of CAD s/p PCI to LAD, aortic regurgitation, HTN, thoracic aortic dissection s/p emergent surgery c/b CVA with residual left-sided weakness, colostomy for bowel ischemia s/p reversal, and psoriasis on Humira, who presented to [facility] on 2020 with worsening SOB with associated chest pressure and LE edema. In the ED, had elevated pro-BNP >33k, SCr, acute anemia, and HTN emergency. Evaluated on admission by cardiology; symptoms improved following IV Lasix. Echocardiogram on admission showing EF 50-55%, enlarged LA, mod-sever MR, mod TR, mod-severe AR, and enlarged aortic root.     Patient evaluated by CT surgery for potential AVR/MVR; required CVVHD medical optimization. GI consulted for acute anemia; patient underwent EGD on  with no significant findings. Patient underwent sternotomy for CABG x2, AVR, and MVR on ; post-op c/b acute blood loss anemia requiring multiple blood products, pressors, and inotropes. Patient was extubated on  and underwent permacath placement with IR on . Pressors and inotropes weaned off and transferred to floors on . Repeat TTE with doppler showing EF 40% with mod LV systolic dysfunction.    Patient was evaluated by PM&R and therapy for functional deficits and gait/ ADL impairments and recommended acute rehabilitation. Patient was medically optimized for discharge to [facility] on 2020.    Patient was seen and examined at the bedside on arrival. Denied HA, fever, chills, coughing, wheezing, SOB, or abdominal pain. Last BM was this morning. Appetite has returned, requesting dinner. Endorses some weakness and numbness on the left LE 2/2 residual deficits from prior stroke. Had some low back pain prior to arrival, but now resolved.  (2020 15:16)      Subjective:  Patient was seen and examined at the PT gym this AM. No acute overnight events. Patient having trouble sleeping; was kept up last night due to cough. Robitussin did not help, did not request inhalers. Endorsed anxiety; open to speaking with psychologist. No other complaints. Denied fever, chills, SOB, or abdominal pain.      Physical Exam:  Vital Signs Last 24 Hrs  T(C): 36.9 (08 Dec 2020 08:26), Max: 36.9 (07 Dec 2020 14:15)  T(F): 98.5 (08 Dec 2020 08:26), Max: 98.5 (07 Dec 2020 14:15)  HR: 76 (08 Dec 2020 08:26) (76 - 100)  BP: 118/75 (08 Dec 2020 08:26) (107/68 - 145/92)  RR: 15 (08 Dec 2020 08:26) (15 - 18)  SpO2: 98% (08 Dec 2020 08:26) (96% - 99%)    20 @ 07:01  -  20 @ 07:00  --------------------------------------------------------  IN: 800 mL / OUT: 2800 mL / NET: -2000 mL      Constitutional - NAD, Comfortable  Chest - CTAB  Cardiovascular - RRR  Abdomen - BS+, Soft, NTND  Extremities -LE edema + -bilateral. Weakness to abduction in 5th digit on the right hand. Right-sided back pain, TTP under shoulder blade  Neurologic Exam -                    Cognitive - Awake, Alert, AAO to self, place, date, year, situation     No facial weakness     Motor left sided weakness from prior stroke      Psychiatric - Mood stable, Affect WNL  Skin:  Sternal incision from sternotomy, no staples in place; appears c/d/i. Left LE incision from vein harvesting, staples in place mild erythema surround incision; appears c/d/i. Right inner thigh incision, as well with staples in place; appears c/d/i. Stage 1 sacral lesion  Lines: Right IJ Permacath on the right chest wall. Midline in right medial arm      Recent Labs/Imagin.7    8.89  )-----------( 246      ( 07 Dec 2020 14:28 )             27.1         143  |  105  |  84<H>  ----------------------------<  118<H>  3.5   |  25  |  3.62<H>    Ca    8.1<L>      07 Dec 2020 14:28  Phos  4.8     12-07  PT/INR - ( 08 Dec 2020 05:45 )   PT: 39.4 sec;   INR: 3.45 ratio         Medications:  acetaminophen   Tablet .. 975 milliGRAM(s) Oral <User Schedule>  acetaminophen   Tablet .. 650 milliGRAM(s) Oral every 6 hours PRN  ALBUTerol    90 MICROgram(s) HFA Inhaler 2 Puff(s) Inhalation every 6 hours PRN  aMIOdarone    Tablet 200 milliGRAM(s) Oral daily  ascorbic acid 500 milliGRAM(s) Oral daily  aspirin enteric coated 81 milliGRAM(s) Oral daily  atorvastatin 40 milliGRAM(s) Oral at bedtime  benzocaine 15 mG/menthol 3.6 mG (Sugar-Free) Lozenge 1 Lozenge Oral two times a day PRN  epoetin yolanda-epbx (RETACRIT) Injectable 61537 Unit(s) IV Push <User Schedule>  folic acid 1 milliGRAM(s) Oral daily  gabapentin 100 milliGRAM(s) Oral at bedtime  guaiFENesin   Syrup  (Sugar-Free) 200 milliGRAM(s) Oral every 6 hours PRN  influenza   Vaccine 0.5 milliLiter(s) IntraMuscular once  iron sucrose IVPB 100 milliGRAM(s) IV Intermittent every 7 days  levETIRAcetam 750 milliGRAM(s) Oral two times a day  lidocaine   Patch 1 Patch Transdermal daily  melatonin 6 milliGRAM(s) Oral at bedtime  methylphenidate 5 milliGRAM(s) Oral <User Schedule>  metoprolol tartrate 25 milliGRAM(s) Oral every 12 hours  multivitamin 1 Tablet(s) Oral daily  nystatin Cream 1 Application(s) Topical two times a day  pantoprazole    Tablet 40 milliGRAM(s) Oral two times a day  QUEtiapine 25 milliGRAM(s) Oral at bedtime

## 2020-12-08 NOTE — PROGRESS NOTE ADULT - ASSESSMENT
60M with PMH CAD s/p PCI to LAD, aortic regurgitation, HTN, thoracic aortic dissection s/p emergent surgery c/b CVA with residual left-sided weakness, colostomy for bowel ischemia s/p reversal, and psoriasis on Humira, presented to [facility] for acute rehab. Post op complications with acute blood loss anemia requiring multiple blood products.    #CAD with AR and MR   #Combined systolic/diastolic CHF  #Debility  -Continue comprehensive rehab program  -s/p sternotomy  with CABG x2, AVR, and MVR 11/13  -Continue Aspirin, Metoprolol, Lipitor    #Atrial flutter  -Continue rate control with Metoprolol, Amiodarone   -Continue AC with Coumadin.  -Supra therapeutic INR. Goal 2-3. Hold Coumadin tonight.     #ESRD on HD due to ATN  #Anemia  -HD MWF  -Cr improving  -Nephrology on board  -Avoid nephrotoxic agents  -Venofer with HD, Retacrit   -Maintain Hb>8    #Seizure Disorder  -No recent seizures  -Continue Keppra BID     #Prophylactic Measure  -DVT prophylaxis: Coumadin. Hold tonight. Downtrending, but supra therapeutic

## 2020-12-08 NOTE — PROGRESS NOTE ADULT - SUBJECTIVE AND OBJECTIVE BOX
Patient is a 60y old  Male who presents with a chief complaint of Impairments: AVR  Impairment Codes: 09 Cardiac (07 Dec 2020 16:08)      Patient seen and examined at bedside.    ALLERGIES:  penicillin (Unknown)    MEDICATIONS  (STANDING):  acetaminophen   Tablet .. 975 milliGRAM(s) Oral <User Schedule>  aMIOdarone    Tablet 200 milliGRAM(s) Oral daily  ascorbic acid 500 milliGRAM(s) Oral daily  aspirin enteric coated 81 milliGRAM(s) Oral daily  atorvastatin 40 milliGRAM(s) Oral at bedtime  epoetin yolanda-epbx (RETACRIT) Injectable 47931 Unit(s) IV Push <User Schedule>  folic acid 1 milliGRAM(s) Oral daily  gabapentin 100 milliGRAM(s) Oral at bedtime  influenza   Vaccine 0.5 milliLiter(s) IntraMuscular once  iron sucrose IVPB 100 milliGRAM(s) IV Intermittent every 7 days  levETIRAcetam 750 milliGRAM(s) Oral two times a day  lidocaine   Patch 1 Patch Transdermal daily  melatonin 6 milliGRAM(s) Oral at bedtime  methylphenidate 5 milliGRAM(s) Oral <User Schedule>  metoprolol tartrate 25 milliGRAM(s) Oral every 12 hours  multivitamin 1 Tablet(s) Oral daily  nystatin Cream 1 Application(s) Topical two times a day  pantoprazole    Tablet 40 milliGRAM(s) Oral two times a day  QUEtiapine 25 milliGRAM(s) Oral at bedtime    MEDICATIONS  (PRN):  acetaminophen   Tablet .. 650 milliGRAM(s) Oral every 6 hours PRN Temp greater or equal to 38C (100.4F), Mild Pain (1 - 3)  ALBUTerol    90 MICROgram(s) HFA Inhaler 2 Puff(s) Inhalation every 6 hours PRN Shortness of Breath and/or Wheezing  benzocaine 15 mG/menthol 3.6 mG (Sugar-Free) Lozenge 1 Lozenge Oral two times a day PRN Sore Throat  guaiFENesin   Syrup  (Sugar-Free) 200 milliGRAM(s) Oral every 6 hours PRN Cough    Vital Signs Last 24 Hrs  T(F): 98.5 (08 Dec 2020 08:26), Max: 98.5 (07 Dec 2020 14:15)  HR: 76 (08 Dec 2020 08:26) (76 - 100)  BP: 118/75 (08 Dec 2020 08:26) (107/68 - 145/92)  RR: 15 (08 Dec 2020 08:26) (15 - 18)  SpO2: 98% (08 Dec 2020 08:26) (96% - 99%)  I&O's Summary    07 Dec 2020 07:01  -  08 Dec 2020 07:00  --------------------------------------------------------  IN: 800 mL / OUT: 2800 mL / NET: -2000 mL          PHYSICAL EXAM:  General: NAD, A/O x 3  ENT: MMM, no scleral icterus  Neck: Supple, No JVD  Lungs: Clear to auscultation bilaterally, no wheezes, rales, rhonchi  Cardio: RRR, S1/S2, No murmurs  Abdomen: Soft, Nontender, Nondistended; Bowel sounds present  Extremities: No calf tenderness, No pitting edema    LABS:                        8.7    8.89  )-----------( 246      ( 07 Dec 2020 14:28 )             27.1       12-07    143  |  105  |  84  ----------------------------<  118  3.5   |  25  |  3.62    Ca    8.1      07 Dec 2020 14:28  Phos  4.8     12-07       eGFR if Non African American: 17 mL/min/1.73M2 (12-07-20 @ 14:28)  eGFR if African American: 20 mL/min/1.73M2 (12-07-20 @ 14:28)    PT/INR - ( 08 Dec 2020 05:45 )   PT: 39.4 sec;   INR: 3.45 ratio                  11-15 Chol -- LDL -- HDL -- Trig 105 mg/dL                        RADIOLOGY & ADDITIONAL TESTS:   < from: 12 Lead ECG (12.07.20 @ 19:47) >    Ventricular Rate 85 BPM    Atrial Rate 202 BPM    QRS Duration 94 ms    Q-T Interval 390 ms    QTC Calculation(Bazett) 464 ms    P Axis -62 degrees    R Axis -31 degrees    T Axis 149 degrees    Diagnosis Line Atrial flutter with variable AV block  Left axis deviation  Voltage criteria for left ventricular hypertrophy  ST and T wave abnormality, consider lateral ischemia  Prolonged QT  Abnormal ECG  No previous ECGs available    Confirmed by MAYTE OROZCO MD ([id number]) on 12/8/2020 8:28:25 AM    < end of copied text >      Care Discussed with Consultants/Other Providers: Dr. Palafox (physiatry)

## 2020-12-08 NOTE — CONSULT NOTE ADULT - SUBJECTIVE AND OBJECTIVE BOX
Pt is a 61 y/o right-handed male with PMHx of CAD s/p PCI to LAD, aortic regurgitation, HTN, thoracic aortic dissection s/p emergent surgery c/b CVA with residual left-sided weakness, colostomy for bowel ischemia s/p reversal, and psoriasis on Humira, who presented to [facility] on 11/02/2020 with worsening SOB with associated chest pressure and LE edema. In the ED, had elevated pro-BNP >33k, SCr, acute anemia, and HTN emergency. Evaluated on admission by cardiology; symptoms improved following IV Lasix. Echocardiogram on admission showing EF 50-55%, enlarged LA, mod-sever MR, mod TR, mod-severe AR, and enlarged aortic root. Pt evaluated by CT surgery for potential AVR/MVR; required CVVHD medical optimization. GI consulted for acute anemia; Pt underwent EGD on 11/09 with no significant findings. Pt underwent sternotomy for CABG x2, AVR, and MVR on 11/13; post-op c/b acute blood loss anemia requiring multiple blood products, pressors, and inotropes. Pt was extubated on 11/16 and underwent permacath placement with IR on 11/20. Pressors and inotropes weaned off and transferred to floors on 11/28. Repeat TTE with doppler showing EF 40% with mod LV systolic dysfunction. Pt was evaluated by PM&R and therapy for functional deficits and gait/ ADL impairments and recommended acute rehabilitation. Pt was medically optimized for discharge to [facility] on 11/30/2020. PMHx: As noted above. Current meds: Trazodone 25 mg HS. Please see list in Pt’s chart. Social Hx: Pt is single and lives alone. He completed 10th grade and used to be an . He denies hx of mental illness, and has remote hx of nicotine and cocaine abuse. Pt is Yazdanism. He enjoys cars and motorcycles.  Findings: Pt was seen for an initial assessment of his cognitive and emotional functioning. MoCA was administered; Pt’s results (17/30) were in the Moderate Impairment range. His scores in mood measures suggested mild levels of anxiety and moderate of depression (GAD7 = 7/21; PHQ9 = 16/27). Pt was alert, closely Ox3 (disoriented to the exact date), and cooperative. Attn/Conc: Simple auditory attention - impaired. Sustained auditory attention - intact. Concentration - intact. Working memory for calculations – moderately impaired (1/5 serial 7s). Language: Pt’s speech was of normal volume, pitch and pace. Naming - intact. Sentence repetition - impaired. Phonemic fluency - moderately impaired. Memory: Encoding of 5 words was  intact (5/5); delayed verbal recall – moderately impaired (2/5, improving to 5/5 with cueing). LTM was closely for US presidents (3/4, improving to 4/4 with minimal cueing). Visual memory – impaired. Visuospatial: Visuomotor integration – impaired for copy of a 3D figure, sloppiness was noted. Executive Functions: Set-shifting - intact. Organizational skills - moderately impaired. Abstract reasoning - intact. Initiation - moderately impaired. Self-awareness - fair. Verbal problem solving – mildly impaired. Emotional functioning: Affect - constricted. Mood - euthymic ("fine"); Pt reported experiencing anxiety, increased appetie, decreased sleeep. On mood measures he additionally reported very frequent anhedonia, insomnia, poor concentration, restlessness, anxiety, difficulty relaxing, restlessness, and irritability; frequent poor appetite; and occasional depressed mood, fatigue/low energy, inability to stop worrying, worrying too much, and catastrophization.  Thought processes were somewhat circumstantial. No abnormal thought contents were observed.  Pt reported experiencing VH (seeing [place] scenery and animals or being in a car shop), and denied AH. Pt also denied SI/HI/I/P. Insight - fair. Judgment - fair.

## 2020-12-08 NOTE — CONSULT NOTE ADULT - ASSESSMENT
Assessment: Pt presents with moderate cognitive deficits (major neurocognitive disorder due to CVA). He exhibits difficulties in simple auditory attention, working memory for calculation, auditory-verbal learning for a list of words, visuospatial skills, and aspects of language (repetition, fluency) and executive functions (organizational skills, initiation, problem solving). Pt's affect is constricted, depressed, and he reports several depressive and anxiety symptoms in response to his current medical condition. Some of his emotional symptoms may have been present for a much longer time. FIM scores: Social Interaction 4; Problem Solving 5; Memory 4.  Plan: Individual supportive psychotherapy to monitor cognition, affect/mood, and behavior. Pt may benefit from antidepressant medication given the severity (moderate) of his Cognitive remediation during speech therapy sessions is strongly recommended. Participation in recreation/art therapy in order to have pleasure and mastery experiences and regain/reestablish a sense of routine.

## 2020-12-08 NOTE — PROGRESS NOTE ADULT - ATTENDING COMMENTS
Chart reviewed.  patient seen in PT gym  Reports poor sleep and endorses some anxiety  NP consult requested  Also will d/c seroquel and trial trazodone for sleep  Continue rehab program    2. Progress reviewed at team conference today - TDD 12/15     3 To d/w CTS- staple removal

## 2020-12-08 NOTE — PROGRESS NOTE ADULT - SUBJECTIVE AND OBJECTIVE BOX
No distress    Vital Signs Last 24 Hrs  T(C): 36.9 (12-08-20 @ 08:26), Max: 36.9 (12-08-20 @ 08:26)  T(F): 98.5 (12-08-20 @ 08:26), Max: 98.5 (12-08-20 @ 08:26)  HR: 76 (12-08-20 @ 08:26) (76 - 99)  BP: 118/75 (12-08-20 @ 08:26) (107/68 - 145/92)  RR: 15 (12-08-20 @ 08:26) (15 - 18)  SpO2: 98% (12-08-20 @ 08:26) (96% - 99%)    s1s2  b/l air entry  soft, NT, ND  + edema                                                      8.7    8.89  )-----------( 246      ( 07 Dec 2020 14:28 )             27.1     07 Dec 2020 14:28    143    |  105    |  84     ----------------------------<  118    3.5     |  25     |  3.62     Ca    8.1        07 Dec 2020 14:28  Phos  4.8       07 Dec 2020 14:28    PT/INR - ( 08 Dec 2020 05:45 )   PT: 39.4 sec;   INR: 3.45 ratio      acetaminophen   Tablet .. 975 milliGRAM(s) Oral <User Schedule>  acetaminophen   Tablet .. 650 milliGRAM(s) Oral every 6 hours PRN  ALBUTerol    90 MICROgram(s) HFA Inhaler 2 Puff(s) Inhalation every 6 hours PRN  aMIOdarone    Tablet 200 milliGRAM(s) Oral daily  ascorbic acid 500 milliGRAM(s) Oral daily  aspirin enteric coated 81 milliGRAM(s) Oral daily  atorvastatin 40 milliGRAM(s) Oral at bedtime  benzocaine 15 mG/menthol 3.6 mG (Sugar-Free) Lozenge 1 Lozenge Oral two times a day PRN  epoetin yolanda-epbx (RETACRIT) Injectable 27524 Unit(s) IV Push <User Schedule>  folic acid 1 milliGRAM(s) Oral daily  gabapentin 100 milliGRAM(s) Oral at bedtime  guaiFENesin   Syrup  (Sugar-Free) 200 milliGRAM(s) Oral every 6 hours PRN  influenza   Vaccine 0.5 milliLiter(s) IntraMuscular once  iron sucrose IVPB 100 milliGRAM(s) IV Intermittent every 7 days  levETIRAcetam 750 milliGRAM(s) Oral two times a day  lidocaine   Patch 1 Patch Transdermal daily  melatonin 6 milliGRAM(s) Oral at bedtime  methylphenidate 5 milliGRAM(s) Oral <User Schedule>  metoprolol tartrate 25 milliGRAM(s) Oral every 12 hours  multivitamin 1 Tablet(s) Oral daily  nystatin Cream 1 Application(s) Topical two times a day  pantoprazole    Tablet 40 milliGRAM(s) Oral two times a day  traZODone 25 milliGRAM(s) Oral at bedtime    A/P:    S/p CABG x 2, AVR, MVR - 11/13/20  Complicated hospital course as per HPI  Now on HD MWF  TMP as able w/HD  Epogen w/HD 3 x week  Will f/u renal fx, UO  Rehab    [phone]

## 2020-12-09 ENCOUNTER — TRANSCRIPTION ENCOUNTER (OUTPATIENT)
Age: 60
End: 2020-12-09

## 2020-12-09 LAB
ANION GAP SERPL CALC-SCNC: 15 MMOL/L — SIGNIFICANT CHANGE UP (ref 5–17)
BUN SERPL-MCNC: 81 MG/DL — HIGH (ref 7–23)
CALCIUM SERPL-MCNC: 8.2 MG/DL — LOW (ref 8.4–10.5)
CHLORIDE SERPL-SCNC: 101 MMOL/L — SIGNIFICANT CHANGE UP (ref 96–108)
CO2 SERPL-SCNC: 23 MMOL/L — SIGNIFICANT CHANGE UP (ref 22–31)
CREAT SERPL-MCNC: 4 MG/DL — HIGH (ref 0.5–1.3)
GLUCOSE SERPL-MCNC: 108 MG/DL — HIGH (ref 70–99)
HCT VFR BLD CALC: 27.1 % — LOW (ref 39–50)
HCT VFR BLD CALC: 28.4 % — LOW (ref 39–50)
HGB BLD-MCNC: 8.4 G/DL — LOW (ref 13–17)
HGB BLD-MCNC: 9.1 G/DL — LOW (ref 13–17)
INR BLD: 2.31 RATIO — HIGH (ref 0.88–1.16)
MCHC RBC-ENTMCNC: 29.2 PG — SIGNIFICANT CHANGE UP (ref 27–34)
MCHC RBC-ENTMCNC: 31 GM/DL — LOW (ref 32–36)
MCV RBC AUTO: 94.1 FL — SIGNIFICANT CHANGE UP (ref 80–100)
NRBC # BLD: 0 /100 WBCS — SIGNIFICANT CHANGE UP (ref 0–0)
PHOSPHATE SERPL-MCNC: 4.7 MG/DL — HIGH (ref 2.5–4.5)
PLATELET # BLD AUTO: 236 K/UL — SIGNIFICANT CHANGE UP (ref 150–400)
POTASSIUM SERPL-MCNC: 3.7 MMOL/L — SIGNIFICANT CHANGE UP (ref 3.5–5.3)
POTASSIUM SERPL-SCNC: 3.7 MMOL/L — SIGNIFICANT CHANGE UP (ref 3.5–5.3)
PROTHROM AB SERPL-ACNC: 26.9 SEC — HIGH (ref 10.6–13.6)
RBC # BLD: 2.88 M/UL — LOW (ref 4.2–5.8)
RBC # FLD: 16 % — HIGH (ref 10.3–14.5)
SODIUM SERPL-SCNC: 139 MMOL/L — SIGNIFICANT CHANGE UP (ref 135–145)
WBC # BLD: 9.45 K/UL — SIGNIFICANT CHANGE UP (ref 3.8–10.5)
WBC # FLD AUTO: 9.45 K/UL — SIGNIFICANT CHANGE UP (ref 3.8–10.5)

## 2020-12-09 PROCEDURE — 99232 SBSQ HOSP IP/OBS MODERATE 35: CPT

## 2020-12-09 PROCEDURE — 99406 BEHAV CHNG SMOKING 3-10 MIN: CPT

## 2020-12-09 RX ORDER — WARFARIN SODIUM 2.5 MG/1
3 TABLET ORAL ONCE
Refills: 0 | Status: DISCONTINUED | OUTPATIENT
Start: 2020-12-09 | End: 2020-12-09

## 2020-12-09 RX ADMIN — ERYTHROPOIETIN 10000 UNIT(S): 10000 INJECTION, SOLUTION INTRAVENOUS; SUBCUTANEOUS at 14:39

## 2020-12-09 RX ADMIN — Medication 200 MILLIGRAM(S): at 23:05

## 2020-12-09 RX ADMIN — Medication 975 MILLIGRAM(S): at 23:04

## 2020-12-09 RX ADMIN — Medication 6 MILLIGRAM(S): at 21:43

## 2020-12-09 RX ADMIN — Medication 1 MILLIGRAM(S): at 11:46

## 2020-12-09 RX ADMIN — Medication 500 MILLIGRAM(S): at 11:47

## 2020-12-09 RX ADMIN — GABAPENTIN 100 MILLIGRAM(S): 400 CAPSULE ORAL at 21:43

## 2020-12-09 RX ADMIN — BENZOCAINE AND MENTHOL 1 LOZENGE: 5; 1 LIQUID ORAL at 21:43

## 2020-12-09 RX ADMIN — PANTOPRAZOLE SODIUM 40 MILLIGRAM(S): 20 TABLET, DELAYED RELEASE ORAL at 05:27

## 2020-12-09 RX ADMIN — Medication 25 MILLIGRAM(S): at 05:27

## 2020-12-09 RX ADMIN — Medication 25 MILLIGRAM(S): at 17:27

## 2020-12-09 RX ADMIN — Medication 975 MILLIGRAM(S): at 21:42

## 2020-12-09 RX ADMIN — Medication 25 MILLIGRAM(S): at 21:42

## 2020-12-09 RX ADMIN — LEVETIRACETAM 750 MILLIGRAM(S): 250 TABLET, FILM COATED ORAL at 05:27

## 2020-12-09 RX ADMIN — Medication 650 MILLIGRAM(S): at 14:17

## 2020-12-09 RX ADMIN — AMIODARONE HYDROCHLORIDE 200 MILLIGRAM(S): 400 TABLET ORAL at 05:26

## 2020-12-09 RX ADMIN — Medication 81 MILLIGRAM(S): at 11:46

## 2020-12-09 RX ADMIN — PANTOPRAZOLE SODIUM 40 MILLIGRAM(S): 20 TABLET, DELAYED RELEASE ORAL at 17:27

## 2020-12-09 RX ADMIN — Medication 1 TABLET(S): at 11:47

## 2020-12-09 RX ADMIN — Medication 650 MILLIGRAM(S): at 13:39

## 2020-12-09 RX ADMIN — NYSTATIN CREAM 1 APPLICATION(S): 100000 CREAM TOPICAL at 17:28

## 2020-12-09 RX ADMIN — ATORVASTATIN CALCIUM 40 MILLIGRAM(S): 80 TABLET, FILM COATED ORAL at 21:42

## 2020-12-09 RX ADMIN — LEVETIRACETAM 750 MILLIGRAM(S): 250 TABLET, FILM COATED ORAL at 17:27

## 2020-12-09 NOTE — PROGRESS NOTE ADULT - ASSESSMENT
60M with PMH CAD s/p PCI to LAD, aortic regurgitation, HTN, thoracic aortic dissection s/p emergent surgery c/b CVA with residual left-sided weakness, colostomy for bowel ischemia s/p reversal, and psoriasis on Humira, presented to [facility] for acute rehab. Post op complications with acute blood loss anemia requiring multiple blood products.    #CAD with AR and MR   #Combined systolic/diastolic CHF  #Debility  -Continue comprehensive rehab program  -s/p sternotomy  with CABG x2, AVR, and MVR 11/13  -Continue Aspirin, Metoprolol, Lipitor    #Atrial flutter  -Continue rate control with Metoprolol, Amiodarone   -Continue AC with Coumadin.  -INR therapeutic. Goal 2-3. Dose Coumadin tonight.     #ESRD on HD due to ATN  #Anemia  -HD MWF  -Cr improving  -Nephrology on board  -Avoid nephrotoxic agents  -Venofer with HD, Retacrit   -Maintain Hb>8    #Seizure Disorder  -No recent seizures  -Continue Keppra BID     #Prophylactic Measure  -DVT prophylaxis: therapeutic on Coumadin.

## 2020-12-09 NOTE — PROGRESS NOTE ADULT - ASSESSMENT
61yo M with PMHx of CAD s/p PCI to LAD, aortic regurgitation, HTN, thoracic aortic dissection s/p emergent surgery c/b CVA with residual left-sided weakness, colostomy for bowel ischemia s/p reversal, and psoriasis on Humira, who presented to [facility] on 11/02/2020 with worsening SOB with associated chest pressure and LE edema; found to have mod-severe MR and AR, now s/p sternotomy for CABG x2, AVR, and MVR. Hospital Course complicated by SANTOS now requiring HD, acute blood loss anemia s/p multiple PRBCs, and atrial flutter. Admitted for multidisciplinary rehab program.      #Comprehensive Multidisciplinary Rehab Program:- PT/OT/ST 3 hours a day 5 days a week    CAD with AR and MR, s/p sternotomy  with CABG x2, AVR, and MVR  - repeat TTE prior to discharge on 11/23 with EF 40%  - Daily INR; c/w Coumadin  - c/w ASA 81mg daily  - c/w atorvastatin 40mg qhs  - c/w Metoprolol tartrate 50mg q8h      #Anemia:- s/p 1 units PRBC on 12/4 with HD  - continue to monitor CBC with HD    Atrial flutter:- c/w Coumadin and Metoprolol, - c/w Amiodarone 200mg daily- Coumadin held for 3 days for elevated INR   INR therapeutic today     ATN:- s/p Permcath by IR on 11/20  - renal consult; HD MWF  - Retacrit with HD    History of seizures:- c/w Keppra 750mg BID    HLD:- c/w Atorvastatin    Mood/sleep: sleep improved with trazodone.   NP eval noted     Pain:- Tylenol PRN - c/w gabapentin 100mg qhs  - Lidocaine patch daily for right thoracic back pain; massage with therapy    GI/Bowel:- Senna 2 tabs daily  - GI ppx: Protonix 40mg daily    /Bladder: Toileting schedule prn    #Diet: Regular, DASH/TLC, renal    - Nutrition to follow    Skin/ Pressure Injury Prevention:- Incisions: sternotomy and left LE vein harvest incisions  - Turn Q2hrs in bed while awake, OOB to Chair, PT/OT    DVT prophylaxis:- Coumadin    Labs with leucocytosis: Improved    Hospitalist consult noted    Disp: Interdisciplinary Team Rounds, 12/08/2020  - mod assist with bathing; min assist with transfers and stairs. Ambulates 60' with RW; mild cognitive deficit  - TDD 12/15; will need family training prior to discharge

## 2020-12-09 NOTE — PROVIDER CONTACT NOTE (OTHER) - ASSESSMENT
pt denies any dizziness. /87  RR 16 Sao2 97% afebrile.   pt returned to bed.  No observable hemroids

## 2020-12-09 NOTE — PROGRESS NOTE ADULT - SUBJECTIVE AND OBJECTIVE BOX
DAILY PROGRESS NOTE:  HPI:  JONATHAN GIBSON is a 59yo M with PMHx of CAD s/p PCI to LAD, aortic regurgitation, HTN, thoracic aortic dissection s/p emergent surgery c/b CVA with residual left-sided weakness, colostomy for bowel ischemia s/p reversal, and psoriasis on Humira, who presented to [facility] on 2020 with worsening SOB with associated chest pressure and LE edema. In the ED, had elevated pro-BNP >33k, SCr, acute anemia, and HTN emergency. Evaluated on admission by cardiology; symptoms improved following IV Lasix. Echocardiogram on admission showing EF 50-55%, enlarged LA, mod-sever MR, mod TR, mod-severe AR, and enlarged aortic root.     Patient evaluated by CT surgery for potential AVR/MVR; required CVVHD medical optimization. GI consulted for acute anemia; patient underwent EGD on  with no significant findings. Patient underwent sternotomy for CABG x2, AVR, and MVR on ; post-op c/b acute blood loss anemia requiring multiple blood products, pressors, and inotropes. Patient was extubated on  and underwent permacath placement with IR on . Pressors and inotropes weaned off and transferred to floors on . Repeat TTE with doppler showing EF 40% with mod LV systolic dysfunction.    Patient was evaluated by PM&R and therapy for functional deficits and gait/ ADL impairments and recommended acute rehabilitation. Patient was medically optimized for discharge to [facility] on 2020.      Subjective:  Patient was seen and examined at bedside this am  Slept well and feels more energetic   Right hand finger numbness unchanged   Cough improved  Denies any HA/dizziness/CP/ dyspnea/abdominal pain or nausea  + BM       Physical Exam:  Vital Signs Last 24 Hrs  T(C): 36.8 (09 Dec 2020 08:18), Max: 36.8 (09 Dec 2020 08:18)  T(F): 98.2 (09 Dec 2020 08:18), Max: 98.2 (09 Dec 2020 08:18)  HR: 73 (09 Dec 2020 08:18) (73 - 84)  BP: 118/73 (09 Dec 2020 08:18) (118/73 - 148/80)  BP(mean): --  RR: 17 (09 Dec 2020 08:18) (16 - 17)  SpO2: 96% (09 Dec 2020 08:18) (96% - 98%)    Daily     Daily Weight in k.2 (09 Dec 2020 06:14)    Constitutional - NAD, Comfortable  Chest - CTAB  Cardiovascular - RRR  Abdomen - BS+, Soft, NTND  Extremities - LE edema much improved   Motor - left weakness from prior stroke   Psychiatric - Mood stable, Affect WNL  Skin:  Sternal incision from sternotomy,c/d/i. Left LE incision from vein harvesting, staples in place +; appears c/d/i. Right inner thigh incision, as well with staples in place; appears c/d/i. Stage 1 sacral lesion  Lines: Right IJ Permacath on the right chest wall. Midline in right medial arm      Recent Labs/Imagin.7    8.89  )-----------( 246      ( 07 Dec 2020 14:28 )             27.1     12-07    143  |  105  |  84<H>  ----------------------------<  118<H>  3.5   |  25  |  3.62<H>    Ca    8.1<L>      07 Dec 2020 14:28  Phos  4.8     12-07      PT/INR - ( 09 Dec 2020 06:20 )   PT: 26.9 sec;   INR: 2.31 ratio         MEDICATIONS  (STANDING):  acetaminophen   Tablet .. 975 milliGRAM(s) Oral <User Schedule>  aMIOdarone    Tablet 200 milliGRAM(s) Oral daily  ascorbic acid 500 milliGRAM(s) Oral daily  aspirin enteric coated 81 milliGRAM(s) Oral daily  atorvastatin 40 milliGRAM(s) Oral at bedtime  epoetin yolanda-epbx (RETACRIT) Injectable 93655 Unit(s) IV Push <User Schedule>  folic acid 1 milliGRAM(s) Oral daily  gabapentin 100 milliGRAM(s) Oral at bedtime  influenza   Vaccine 0.5 milliLiter(s) IntraMuscular once  iron sucrose IVPB 100 milliGRAM(s) IV Intermittent every 7 days  levETIRAcetam 750 milliGRAM(s) Oral two times a day  lidocaine   Patch 1 Patch Transdermal daily  melatonin 6 milliGRAM(s) Oral at bedtime  methylphenidate 5 milliGRAM(s) Oral <User Schedule>  metoprolol tartrate 25 milliGRAM(s) Oral every 12 hours  multivitamin 1 Tablet(s) Oral daily  nystatin Cream 1 Application(s) Topical two times a day  pantoprazole    Tablet 40 milliGRAM(s) Oral two times a day  traZODone 25 milliGRAM(s) Oral at bedtime    MEDICATIONS  (PRN):  acetaminophen   Tablet .. 650 milliGRAM(s) Oral every 6 hours PRN Temp greater or equal to 38C (100.4F), Mild Pain (1 - 3)  ALBUTerol    90 MICROgram(s) HFA Inhaler 2 Puff(s) Inhalation every 6 hours PRN Shortness of Breath and/or Wheezing  benzocaine 15 mG/menthol 3.6 mG (Sugar-Free) Lozenge 1 Lozenge Oral two times a day PRN Sore Throat  guaiFENesin   Syrup  (Sugar-Free) 200 milliGRAM(s) Oral every 6 hours PRN Cough

## 2020-12-09 NOTE — CHART NOTE - NSCHARTNOTEFT_GEN_A_CORE
Nutrition Follow Up Note  Hospital Course   (Per Electronic Medical Record)    Source:  Patient [X]  Medical Record [X]      Diet:   Renal, DASH-TLC Diet w/ Thin Liquids  Tolerates Diet Consistency Well  No Chewing/Swallowing Difficulties  No Recent Nausea, Vomiting, Diarrhea or Constipation (as Per Patient)  Consumes 85% of Meals (as Per Documentation)  Nutrition Education Provided on Renal Diet  on Nepro 8oz BID (Provides 850kcal & 38grams of Protein)  Declines Nutrition Supplementation - Recommend Discontinue     Enteral/Parenteral Nutrition: Not Applicable    Current Weight: 193.7lb on 12/8  Obtain Weights Daily  Weight Fluctuates  Will Continue to Monitor     Pertinent Medications: MEDICATIONS  (STANDING):  acetaminophen   Tablet .. 975 milliGRAM(s) Oral <User Schedule>  aMIOdarone    Tablet 200 milliGRAM(s) Oral daily  ascorbic acid 500 milliGRAM(s) Oral daily  aspirin enteric coated 81 milliGRAM(s) Oral daily  atorvastatin 40 milliGRAM(s) Oral at bedtime  epoetin yolanda-epbx (RETACRIT) Injectable 88559 Unit(s) IV Push <User Schedule>  folic acid 1 milliGRAM(s) Oral daily  gabapentin 100 milliGRAM(s) Oral at bedtime  influenza   Vaccine 0.5 milliLiter(s) IntraMuscular once  iron sucrose IVPB 100 milliGRAM(s) IV Intermittent every 7 days  levETIRAcetam 750 milliGRAM(s) Oral two times a day  lidocaine   Patch 1 Patch Transdermal daily  melatonin 6 milliGRAM(s) Oral at bedtime  methylphenidate 5 milliGRAM(s) Oral <User Schedule>  metoprolol tartrate 25 milliGRAM(s) Oral every 12 hours  multivitamin 1 Tablet(s) Oral daily  nystatin Cream 1 Application(s) Topical two times a day  pantoprazole    Tablet 40 milliGRAM(s) Oral two times a day  traZODone 25 milliGRAM(s) Oral at bedtime    MEDICATIONS  (PRN):  acetaminophen   Tablet .. 650 milliGRAM(s) Oral every 6 hours PRN Temp greater or equal to 38C (100.4F), Mild Pain (1 - 3)  ALBUTerol    90 MICROgram(s) HFA Inhaler 2 Puff(s) Inhalation every 6 hours PRN Shortness of Breath and/or Wheezing  benzocaine 15 mG/menthol 3.6 mG (Sugar-Free) Lozenge 1 Lozenge Oral two times a day PRN Sore Throat  guaiFENesin   Syrup  (Sugar-Free) 200 milliGRAM(s) Oral every 6 hours PRN Cough    Pertinent Labs:  12-07 Na143 mmol/L Glu 118 mg/dL<H> K+ 3.5 mmol/L Cr  3.62 mg/dL<H> BUN 84 mg/dL<H> 12-07 Phos 4.8 mg/dL<H> 12-05 Alb 2.4 g/dL<L> 11-27 PAB 8 mg/dL<L> 11-15 Chol --    LDL --    HDL --    Trig 105 mg/dL    Skin: Stage 1 Pressure Ulcer on Sacrum    Edema: +1 Edema Noted to Bilateral Lower Extremities    (as Per Documentation)     Last Bowel Movement: on 12/8    Estimated Needs:   [X] No Change Since Previous Assessment    Previous Nutrition Diagnosis:   Moderate Malnutrition     Nutrition Diagnosis is [X] Ongoing - Declines Nutrition Supplementation - Recommend Discontinue     New Nutrition Diagnosis: [X] Not Applicable    Interventions:   1. Nutrition Education Provided on Renal Diet  2. Recommend Discontinue Supplement  3. Recommend Continue Nutrition Plan of Care     Monitoring & Evaluation:   [X] Weights   [X] PO Intake   [X] Skin Integrity   [X] Follow Up (Per Protocol)  [X] Tolerance to Diet Prescription   [X] Other: Labs     Registered Dietitian/Nutritionist Remains Available.  Yoni Tracy RDN    Pager # [phone]  Phone# [phone]

## 2020-12-09 NOTE — PROGRESS NOTE ADULT - SUBJECTIVE AND OBJECTIVE BOX
Patient is a 60y old  Male who presents with a chief complaint of Impairments: AVR  Impairment Codes: 09 Cardiac (09 Dec 2020 10:55)      Patient seen and examined at bedside. No overnight events. Participating in therapy.    ALLERGIES:  penicillin (Unknown)    MEDICATIONS  (STANDING):  acetaminophen   Tablet .. 975 milliGRAM(s) Oral <User Schedule>  aMIOdarone    Tablet 200 milliGRAM(s) Oral daily  ascorbic acid 500 milliGRAM(s) Oral daily  aspirin enteric coated 81 milliGRAM(s) Oral daily  atorvastatin 40 milliGRAM(s) Oral at bedtime  epoetin yolanda-epbx (RETACRIT) Injectable 18340 Unit(s) IV Push <User Schedule>  folic acid 1 milliGRAM(s) Oral daily  gabapentin 100 milliGRAM(s) Oral at bedtime  influenza   Vaccine 0.5 milliLiter(s) IntraMuscular once  iron sucrose IVPB 100 milliGRAM(s) IV Intermittent every 7 days  levETIRAcetam 750 milliGRAM(s) Oral two times a day  lidocaine   Patch 1 Patch Transdermal daily  melatonin 6 milliGRAM(s) Oral at bedtime  methylphenidate 5 milliGRAM(s) Oral <User Schedule>  metoprolol tartrate 25 milliGRAM(s) Oral every 12 hours  multivitamin 1 Tablet(s) Oral daily  nystatin Cream 1 Application(s) Topical two times a day  pantoprazole    Tablet 40 milliGRAM(s) Oral two times a day  traZODone 25 milliGRAM(s) Oral at bedtime  warfarin 3 milliGRAM(s) Oral once    MEDICATIONS  (PRN):  acetaminophen   Tablet .. 650 milliGRAM(s) Oral every 6 hours PRN Temp greater or equal to 38C (100.4F), Mild Pain (1 - 3)  ALBUTerol    90 MICROgram(s) HFA Inhaler 2 Puff(s) Inhalation every 6 hours PRN Shortness of Breath and/or Wheezing  benzocaine 15 mG/menthol 3.6 mG (Sugar-Free) Lozenge 1 Lozenge Oral two times a day PRN Sore Throat  guaiFENesin   Syrup  (Sugar-Free) 200 milliGRAM(s) Oral every 6 hours PRN Cough    Vital Signs Last 24 Hrs  T(F): 98.2 (09 Dec 2020 08:18), Max: 98.2 (09 Dec 2020 08:18)  HR: 73 (09 Dec 2020 08:18) (73 - 84)  BP: 118/73 (09 Dec 2020 08:18) (118/73 - 148/80)  RR: 17 (09 Dec 2020 08:18) (16 - 17)  SpO2: 96% (09 Dec 2020 08:18) (96% - 98%)  I&O's Summary        PHYSICAL EXAM:  General: NAD, A/O x 3  ENT: MMM, no scleral icterus  Neck: Supple, No JVD  Lungs: Clear to auscultation bilaterally, no wheezes, rales, rhonchi  Cardio: RRR, S1/S2, No murmurs  Abdomen: Soft, Nontender, Nondistended; Bowel sounds present  Extremities: No calf tenderness, No pitting edema    LABS:                        8.7    8.89  )-----------( 246      ( 07 Dec 2020 14:28 )             27.1       12-07    143  |  105  |  84  ----------------------------<  118  3.5   |  25  |  3.62    Ca    8.1      07 Dec 2020 14:28  Phos  4.8     12-07       eGFR if Non African American: 17 mL/min/1.73M2 (12-07-20 @ 14:28)  eGFR if African American: 20 mL/min/1.73M2 (12-07-20 @ 14:28)    PT/INR - ( 09 Dec 2020 06:20 )   PT: 26.9 sec;   INR: 2.31 ratio                  11-15 Chol -- LDL -- HDL -- Trig 105 mg/dL                        RADIOLOGY & ADDITIONAL TESTS:    Care Discussed with Consultants/Other Providers: Dr. Palafox (physiatry)

## 2020-12-09 NOTE — CHART NOTE - NSCHARTNOTEFT_GEN_A_CORE
Was informed by nurse that patient had large amount of bright red blood noted on the toilet paper and toilet while he was having BM. Patient denied having any symptoms such as SOB, CP, headaches or dizziness. Vitas recorded with  and /87  with repeated vitals after showing HR 97 and /74, 96% O2 on RA. On rectal exam patient noted to have hemorrhoid that does not appear to be actively bleeding. No other lesions noted. Warfarin to be held overnight, with STAT Hb/HCT and TYPE to be drawn. GI consult placed. Plan discussed and agreed upon with hospitalist Dr. Kaufman.

## 2020-12-09 NOTE — PROGRESS NOTE ADULT - SUBJECTIVE AND OBJECTIVE BOX
No distress    Vital Signs Last 24 Hrs  T(C): 36.4 (12-09-20 @ 18:02), Max: 37.1 (12-09-20 @ 14:20)  T(F): 97.5 (12-09-20 @ 18:02), Max: 98.8 (12-09-20 @ 14:20)  HR: 97 (12-09-20 @ 18:41) (73 - 101)  BP: 123/74 (12-09-20 @ 18:41) (118/73 - 153/87)  RR: 16 (12-09-20 @ 18:41) (14 - 17)  SpO2: 96% (12-09-20 @ 18:41) (96% - 100%)    s1s2  b/l air entry  soft, NT, ND  + edema                                                              8.4    9.45  )-----------( 236      ( 09 Dec 2020 14:30 )             27.1     09 Dec 2020 14:30    139    |  101    |  81     ----------------------------<  108    3.7     |  23     |  4.00     Ca    8.2        09 Dec 2020 14:30  Phos  4.7       09 Dec 2020 14:30    PT/INR - ( 09 Dec 2020 06:20 )   PT: 26.9 sec;   INR: 2.31 ratio      acetaminophen   Tablet .. 975 milliGRAM(s) Oral <User Schedule>  acetaminophen   Tablet .. 650 milliGRAM(s) Oral every 6 hours PRN  ALBUTerol    90 MICROgram(s) HFA Inhaler 2 Puff(s) Inhalation every 6 hours PRN  aMIOdarone    Tablet 200 milliGRAM(s) Oral daily  ascorbic acid 500 milliGRAM(s) Oral daily  aspirin enteric coated 81 milliGRAM(s) Oral daily  atorvastatin 40 milliGRAM(s) Oral at bedtime  benzocaine 15 mG/menthol 3.6 mG (Sugar-Free) Lozenge 1 Lozenge Oral two times a day PRN  epoetin yolanda-epbx (RETACRIT) Injectable 35171 Unit(s) IV Push <User Schedule>  folic acid 1 milliGRAM(s) Oral daily  gabapentin 100 milliGRAM(s) Oral at bedtime  guaiFENesin   Syrup  (Sugar-Free) 200 milliGRAM(s) Oral every 6 hours PRN  influenza   Vaccine 0.5 milliLiter(s) IntraMuscular once  iron sucrose IVPB 100 milliGRAM(s) IV Intermittent every 7 days  levETIRAcetam 750 milliGRAM(s) Oral two times a day  lidocaine   Patch 1 Patch Transdermal daily  melatonin 6 milliGRAM(s) Oral at bedtime  methylphenidate 5 milliGRAM(s) Oral <User Schedule>  metoprolol tartrate 25 milliGRAM(s) Oral every 12 hours  multivitamin 1 Tablet(s) Oral daily  nystatin Cream 1 Application(s) Topical two times a day  pantoprazole    Tablet 40 milliGRAM(s) Oral two times a day  traZODone 25 milliGRAM(s) Oral at bedtime    A/P:    S/p CABG x 2, AVR, MVR - 11/13/20  Complicated hospital course as per HPI  Now on HD MWF  TMP as able w/HD  Epogen w/HD 3 x week  Will f/u renal fx, UO  Rehab    [phone]

## 2020-12-10 LAB
HCT VFR BLD CALC: 29.8 % — LOW (ref 39–50)
HGB BLD-MCNC: 9 G/DL — LOW (ref 13–17)
INR BLD: 1.85 RATIO — HIGH (ref 0.88–1.16)
MCHC RBC-ENTMCNC: 28.6 PG — SIGNIFICANT CHANGE UP (ref 27–34)
MCHC RBC-ENTMCNC: 30.2 GM/DL — LOW (ref 32–36)
MCV RBC AUTO: 94.6 FL — SIGNIFICANT CHANGE UP (ref 80–100)
NRBC # BLD: 0 /100 WBCS — SIGNIFICANT CHANGE UP (ref 0–0)
PLATELET # BLD AUTO: 209 K/UL — SIGNIFICANT CHANGE UP (ref 150–400)
PROTHROM AB SERPL-ACNC: 21.7 SEC — HIGH (ref 10.6–13.6)
RBC # BLD: 3.15 M/UL — LOW (ref 4.2–5.8)
RBC # FLD: 16 % — HIGH (ref 10.3–14.5)
WBC # BLD: 7.03 K/UL — SIGNIFICANT CHANGE UP (ref 3.8–10.5)
WBC # FLD AUTO: 7.03 K/UL — SIGNIFICANT CHANGE UP (ref 3.8–10.5)

## 2020-12-10 PROCEDURE — 99232 SBSQ HOSP IP/OBS MODERATE 35: CPT

## 2020-12-10 PROCEDURE — 99232 SBSQ HOSP IP/OBS MODERATE 35: CPT | Mod: GC

## 2020-12-10 RX ORDER — WARFARIN SODIUM 2.5 MG/1
3 TABLET ORAL ONCE
Refills: 0 | Status: COMPLETED | OUTPATIENT
Start: 2020-12-10 | End: 2020-12-10

## 2020-12-10 RX ORDER — POTASSIUM CHLORIDE 20 MEQ
20 PACKET (EA) ORAL
Refills: 0 | Status: DISCONTINUED | OUTPATIENT
Start: 2020-12-10 | End: 2020-12-10

## 2020-12-10 RX ADMIN — Medication 25 MILLIGRAM(S): at 17:56

## 2020-12-10 RX ADMIN — PANTOPRAZOLE SODIUM 40 MILLIGRAM(S): 20 TABLET, DELAYED RELEASE ORAL at 06:14

## 2020-12-10 RX ADMIN — AMIODARONE HYDROCHLORIDE 200 MILLIGRAM(S): 400 TABLET ORAL at 06:13

## 2020-12-10 RX ADMIN — Medication 500 MILLIGRAM(S): at 12:53

## 2020-12-10 RX ADMIN — NYSTATIN CREAM 1 APPLICATION(S): 100000 CREAM TOPICAL at 06:19

## 2020-12-10 RX ADMIN — PANTOPRAZOLE SODIUM 40 MILLIGRAM(S): 20 TABLET, DELAYED RELEASE ORAL at 17:57

## 2020-12-10 RX ADMIN — LEVETIRACETAM 750 MILLIGRAM(S): 250 TABLET, FILM COATED ORAL at 06:14

## 2020-12-10 RX ADMIN — LIDOCAINE 1 PATCH: 4 CREAM TOPICAL at 17:57

## 2020-12-10 RX ADMIN — Medication 6 MILLIGRAM(S): at 21:07

## 2020-12-10 RX ADMIN — NYSTATIN CREAM 1 APPLICATION(S): 100000 CREAM TOPICAL at 17:58

## 2020-12-10 RX ADMIN — Medication 5 MILLIGRAM(S): at 06:17

## 2020-12-10 RX ADMIN — WARFARIN SODIUM 3 MILLIGRAM(S): 2.5 TABLET ORAL at 21:09

## 2020-12-10 RX ADMIN — GABAPENTIN 100 MILLIGRAM(S): 400 CAPSULE ORAL at 21:07

## 2020-12-10 RX ADMIN — Medication 975 MILLIGRAM(S): at 21:07

## 2020-12-10 RX ADMIN — LEVETIRACETAM 750 MILLIGRAM(S): 250 TABLET, FILM COATED ORAL at 17:56

## 2020-12-10 RX ADMIN — Medication 25 MILLIGRAM(S): at 06:14

## 2020-12-10 RX ADMIN — LIDOCAINE 1 PATCH: 4 CREAM TOPICAL at 19:08

## 2020-12-10 RX ADMIN — Medication 25 MILLIGRAM(S): at 21:07

## 2020-12-10 RX ADMIN — Medication 1 TABLET(S): at 12:53

## 2020-12-10 RX ADMIN — Medication 1 MILLIGRAM(S): at 12:54

## 2020-12-10 RX ADMIN — Medication 975 MILLIGRAM(S): at 22:37

## 2020-12-10 RX ADMIN — Medication 81 MILLIGRAM(S): at 12:53

## 2020-12-10 RX ADMIN — ATORVASTATIN CALCIUM 40 MILLIGRAM(S): 80 TABLET, FILM COATED ORAL at 21:07

## 2020-12-10 NOTE — PROGRESS NOTE ADULT - ATTENDING COMMENTS
Chart reviewed. Patient seen at bedside.   Overnight with bleeding per rectum x1 along with BM   none since then and patient states that he few BMs ( which is the norm for him since his remote surgery needing colostomy and takes immodium prn)  On exam- external hemorrhoid noted. no bleeding. Patient had prior GI work up before cardiac surgery. GI consult requested here.  Coumadin held yesterday. ? restart today if no further bleeding    2. Patient also reports non productive cough overnight. Symptomatic treatment    3. Skin: sternal incision has healed well. Left thigh incision - healed well- staples removed. Left leg - incisions with some proximal erythema - distal well healed- will remove staples.     3. CBC stable this am. Labs in AM   Hospitalist all noted    4. Progressing well in therapy   Continue rehab program

## 2020-12-10 NOTE — PROGRESS NOTE ADULT - SUBJECTIVE AND OBJECTIVE BOX
No distress    Vital Signs Last 24 Hrs  T(C): 36.9 (12-10-20 @ 09:14), Max: 36.9 (12-09-20 @ 20:28)  T(F): 98.4 (12-10-20 @ 09:14), Max: 98.5 (12-09-20 @ 20:28)  HR: 79 (12-10-20 @ 09:14) (79 - 101)  BP: 113/68 (12-10-20 @ 09:14) (113/68 - 153/87)  RR: 15 (12-10-20 @ 09:14) (14 - 16)  SpO2: 95% (12-10-20 @ 09:14) (95% - 97%)    s1s2  b/l air entry  soft, NT, ND  + edema                                                                      9.0    7.03  )-----------( 209      ( 10 Dec 2020 05:35 )             29.8     09 Dec 2020 14:30    139    |  101    |  81     ----------------------------<  108    3.7     |  23     |  4.00     Ca    8.2        09 Dec 2020 14:30  Phos  4.7       09 Dec 2020 14:30    PT/INR - ( 10 Dec 2020 05:35 )   PT: 21.7 sec;   INR: 1.85 ratio      acetaminophen   Tablet .. 975 milliGRAM(s) Oral <User Schedule>  acetaminophen   Tablet .. 650 milliGRAM(s) Oral every 6 hours PRN  ALBUTerol    90 MICROgram(s) HFA Inhaler 2 Puff(s) Inhalation every 6 hours PRN  aMIOdarone    Tablet 200 milliGRAM(s) Oral daily  ascorbic acid 500 milliGRAM(s) Oral daily  aspirin enteric coated 81 milliGRAM(s) Oral daily  atorvastatin 40 milliGRAM(s) Oral at bedtime  benzocaine 15 mG/menthol 3.6 mG (Sugar-Free) Lozenge 1 Lozenge Oral two times a day PRN  epoetin yolanda-epbx (RETACRIT) Injectable 49465 Unit(s) IV Push <User Schedule>  folic acid 1 milliGRAM(s) Oral daily  gabapentin 100 milliGRAM(s) Oral at bedtime  guaiFENesin   Syrup  (Sugar-Free) 200 milliGRAM(s) Oral every 6 hours PRN  hydrocodone/homatropine Syrup 5 milliLiter(s) Oral at bedtime  influenza   Vaccine 0.5 milliLiter(s) IntraMuscular once  iron sucrose IVPB 100 milliGRAM(s) IV Intermittent every 7 days  levETIRAcetam 750 milliGRAM(s) Oral two times a day  lidocaine   Patch 1 Patch Transdermal daily  melatonin 6 milliGRAM(s) Oral at bedtime  methylphenidate 5 milliGRAM(s) Oral <User Schedule>  metoprolol tartrate 25 milliGRAM(s) Oral every 12 hours  multivitamin 1 Tablet(s) Oral daily  nystatin Cream 1 Application(s) Topical two times a day  pantoprazole    Tablet 40 milliGRAM(s) Oral two times a day  traZODone 25 milliGRAM(s) Oral at bedtime  warfarin 3 milliGRAM(s) Oral once    A/P:    S/p CABG x 2, AVR, MVR - 11/13/20  Complicated hospital course as per HPI  Now on HD MWF  TMP as able w/HD  Epogen w/HD 3 x week  Rehab    [phone]

## 2020-12-10 NOTE — PROGRESS NOTE ADULT - ASSESSMENT
60M with PMH CAD s/p PCI to LAD, aortic regurgitation, HTN, thoracic aortic dissection s/p emergent surgery c/b CVA with residual left-sided weakness, colostomy for bowel ischemia s/p reversal, and psoriasis on Humira, presented to [facility] for acute rehab. Post op complications with acute blood loss anemia requiring multiple blood products.    #CAD with AR and MR   #Combined systolic/diastolic CHF  #Debility  -Continue comprehensive rehab program  -s/p sternotomy  with CABG x2, AVR, and MVR 11/13  -Continue Aspirin, Metoprolol, Lipitor    #Atrial flutter  -Continue rate control with Metoprolol, Amiodarone   -Continue AC with Coumadin.  -INR sub therapeutic. Goal 2-3. Dose Coumadin tonight.     #Bright red blood per rectum  -Likely due to hemorrhoid, low suspicion for GI bleed  -H/H stable, no overt signs of bleeding  -No further episodes    #ESRD on HD due to ATN  #Anemia  -HD MWF  -Nephrology on board  -Avoid nephrotoxic agents  -Venofer with HD, Retacrit   -Maintain Hb>8    #Seizure Disorder  -No recent seizures  -Continue Keppra BID     #Prophylactic Measure  -DVT prophylaxis: sub therapeutic on Coumadin. Coumadin tonight.

## 2020-12-10 NOTE — PROGRESS NOTE ADULT - SUBJECTIVE AND OBJECTIVE BOX
Patient is a 60y old  Male who presents with a chief complaint of Impairments: AVR  Impairment Codes: 09 Cardiac (09 Dec 2020 19:04)      Patient seen and examined at bedside. Had episode of bright red blood per rectum with BM overnight, currently resolved. Admits to chronic cough at night.   Participating in therapy. Having breakfast.    ALLERGIES:  penicillin (Unknown)    MEDICATIONS  (STANDING):  acetaminophen   Tablet .. 975 milliGRAM(s) Oral <User Schedule>  aMIOdarone    Tablet 200 milliGRAM(s) Oral daily  ascorbic acid 500 milliGRAM(s) Oral daily  aspirin enteric coated 81 milliGRAM(s) Oral daily  atorvastatin 40 milliGRAM(s) Oral at bedtime  epoetin yolanda-epbx (RETACRIT) Injectable 81493 Unit(s) IV Push <User Schedule>  folic acid 1 milliGRAM(s) Oral daily  gabapentin 100 milliGRAM(s) Oral at bedtime  hydrocodone/homatropine Syrup 5 milliLiter(s) Oral at bedtime  influenza   Vaccine 0.5 milliLiter(s) IntraMuscular once  iron sucrose IVPB 100 milliGRAM(s) IV Intermittent every 7 days  levETIRAcetam 750 milliGRAM(s) Oral two times a day  lidocaine   Patch 1 Patch Transdermal daily  melatonin 6 milliGRAM(s) Oral at bedtime  methylphenidate 5 milliGRAM(s) Oral <User Schedule>  metoprolol tartrate 25 milliGRAM(s) Oral every 12 hours  multivitamin 1 Tablet(s) Oral daily  nystatin Cream 1 Application(s) Topical two times a day  pantoprazole    Tablet 40 milliGRAM(s) Oral two times a day  traZODone 25 milliGRAM(s) Oral at bedtime    MEDICATIONS  (PRN):  acetaminophen   Tablet .. 650 milliGRAM(s) Oral every 6 hours PRN Temp greater or equal to 38C (100.4F), Mild Pain (1 - 3)  ALBUTerol    90 MICROgram(s) HFA Inhaler 2 Puff(s) Inhalation every 6 hours PRN Shortness of Breath and/or Wheezing  benzocaine 15 mG/menthol 3.6 mG (Sugar-Free) Lozenge 1 Lozenge Oral two times a day PRN Sore Throat  guaiFENesin   Syrup  (Sugar-Free) 200 milliGRAM(s) Oral every 6 hours PRN Cough    Vital Signs Last 24 Hrs  T(F): 98.4 (10 Dec 2020 09:14), Max: 98.8 (09 Dec 2020 14:20)  HR: 79 (10 Dec 2020 09:14) (76 - 101)  BP: 113/68 (10 Dec 2020 09:14) (113/68 - 153/87)  RR: 15 (10 Dec 2020 09:14) (14 - 16)  SpO2: 95% (10 Dec 2020 09:14) (95% - 100%)  I&O's Summary    09 Dec 2020 07:01  -  10 Dec 2020 07:00  --------------------------------------------------------  IN: 0 mL / OUT: 2500 mL / NET: -2500 mL          PHYSICAL EXAM:  General: NAD, A/O x 3  ENT: MMM, no scleral icterus  Neck: Supple, No JVD  Lungs: Clear to auscultation bilaterally, no wheezes, rales, rhonchi  Cardio: RRR, S1/S2, No murmurs  Abdomen: Soft, Nontender, Nondistended; Bowel sounds present  Extremities: No calf tenderness, No pitting edema    LABS:                        9.0    7.03  )-----------( 209      ( 10 Dec 2020 05:35 )             29.8       12-09    139  |  101  |  81  ----------------------------<  108  3.7   |  23  |  4.00    Ca    8.2      09 Dec 2020 14:30  Phos  4.7     12-09       eGFR if Non African American: 15 mL/min/1.73M2 (12-09-20 @ 14:30)  eGFR if : 18 mL/min/1.73M2 (12-09-20 @ 14:30)    PT/INR - ( 10 Dec 2020 05:35 )   PT: 21.7 sec;   INR: 1.85 ratio                  11-15 Chol -- LDL -- HDL -- Trig 105 mg/dL                        RADIOLOGY & ADDITIONAL TESTS:    Care Discussed with Consultants/Other Providers: Dr. Palafox (physiatry)

## 2020-12-10 NOTE — PROGRESS NOTE ADULT - SUBJECTIVE AND OBJECTIVE BOX
DAILY PROGRESS NOTE:  HPI:  JONATHAN GIBSON is a 61yo M with PMHx of CAD s/p PCI to LAD, aortic regurgitation, HTN, thoracic aortic dissection s/p emergent surgery c/b CVA with residual left-sided weakness, colostomy for bowel ischemia s/p reversal, and psoriasis on Humira, who presented to [facility] on 2020 with worsening SOB with associated chest pressure and LE edema. In the ED, had elevated pro-BNP >33k, SCr, acute anemia, and HTN emergency. Evaluated on admission by cardiology; symptoms improved following IV Lasix. Echocardiogram on admission showing EF 50-55%, enlarged LA, mod-sever MR, mod TR, mod-severe AR, and enlarged aortic root.     Patient evaluated by CT surgery for potential AVR/MVR; required CVVHD medical optimization. GI consulted for acute anemia; patient underwent EGD on  with no significant findings. Patient underwent sternotomy for CABG x2, AVR, and MVR on ; post-op c/b acute blood loss anemia requiring multiple blood products, pressors, and inotropes. Patient was extubated on  and underwent permacath placement with IR on . Pressors and inotropes weaned off and transferred to floors on . Repeat TTE with doppler showing EF 40% with mod LV systolic dysfunction.    Patient was evaluated by PM&R and therapy for functional deficits and gait/ ADL impairments and recommended acute rehabilitation. Patient was medically optimized for discharge to [facility] on 2020.    Patient was seen and examined at the bedside on arrival. Denied HA, fever, chills, coughing, wheezing, SOB, or abdominal pain. Last BM was this morning. Appetite has returned, requesting dinner. Endorses some weakness and numbness on the left LE 2/2 residual deficits from prior stroke. Had some low back pain prior to arrival, but now resolved.  (2020 15:16)      Subjective:  Patient was seen and examined at the bedside this AM. Had bright red blood in toilet bowl last night after BM; asymptomatic and repeat hgb stable. This morning, states bleeding has resolved, had 2 more BMs afterwards that were non-bloody; Coumadin held overnight. Also had trouble sleeping at night 2/2 persistent cough, which does not improve with Robitussin. Denied HA, dizziness, lightheadedness, CP, palpitations, SOB, or abdominal pain.       Physical Exam:  Vital Signs Last 24 Hrs  T(C): 36.9 (10 Dec 2020 09:14), Max: 37.1 (09 Dec 2020 14:20)  T(F): 98.4 (10 Dec 2020 09:14), Max: 98.8 (09 Dec 2020 14:20)  HR: 79 (10 Dec 2020 09:14) (76 - 101)  BP: 113/68 (10 Dec 2020 09:14) (113/68 - 153/87)  RR: 15 (10 Dec 2020 09:14) (14 - 16)  SpO2: 95% (10 Dec 2020 09:14) (95% - 100%)    20 @ 07:01  -  12-10-20 @ 07:00  --------------------------------------------------------  IN: 0 mL / OUT: 2500 mL / NET: -2500 mL      Constitutional - NAD, Comfortable  Chest - CTAB  Cardiovascular - RRR  Abdomen - BS+, Soft, NTND  Extremities - LE edema much improved   Motor - left weakness from prior stroke   Psychiatric - Mood stable, Affect WNL  Rectal: external hemorrhoid noted, not active bleeding or blood seen  Skin:  Sternal incision from sternotomy,c/d/i. Left LE incision from vein harvesting, staples in place +; appears c/d/i. Right inner thigh incision, as well with staples in place; appears c/d/i. Stage 1 sacral lesion  Lines: Right IJ Permacath on the right chest wall. Midline in right medial arm      Recent Labs/Imagin.0    7.03  )-----------( 209      ( 10 Dec 2020 05:35 )             29.8     12-09    139  |  101  |  81<H>  ----------------------------<  108<H>  3.7   |  23  |  4.00<H>    Ca    8.2<L>      09 Dec 2020 14:30  Phos  4.7     12-09    PT/INR - ( 10 Dec 2020 05:35 )   PT: 21.7 sec;   INR: 1.85 ratio           Medications:  acetaminophen   Tablet .. 975 milliGRAM(s) Oral <User Schedule>  acetaminophen   Tablet .. 650 milliGRAM(s) Oral every 6 hours PRN  ALBUTerol    90 MICROgram(s) HFA Inhaler 2 Puff(s) Inhalation every 6 hours PRN  aMIOdarone    Tablet 200 milliGRAM(s) Oral daily  ascorbic acid 500 milliGRAM(s) Oral daily  aspirin enteric coated 81 milliGRAM(s) Oral daily  atorvastatin 40 milliGRAM(s) Oral at bedtime  benzocaine 15 mG/menthol 3.6 mG (Sugar-Free) Lozenge 1 Lozenge Oral two times a day PRN  epoetin yolanda-epbx (RETACRIT) Injectable 88746 Unit(s) IV Push <User Schedule>  folic acid 1 milliGRAM(s) Oral daily  gabapentin 100 milliGRAM(s) Oral at bedtime  guaiFENesin   Syrup  (Sugar-Free) 200 milliGRAM(s) Oral every 6 hours PRN  hydrocodone/homatropine Syrup 5 milliLiter(s) Oral at bedtime  influenza   Vaccine 0.5 milliLiter(s) IntraMuscular once  iron sucrose IVPB 100 milliGRAM(s) IV Intermittent every 7 days  levETIRAcetam 750 milliGRAM(s) Oral two times a day  lidocaine   Patch 1 Patch Transdermal daily  melatonin 6 milliGRAM(s) Oral at bedtime  methylphenidate 5 milliGRAM(s) Oral <User Schedule>  metoprolol tartrate 25 milliGRAM(s) Oral every 12 hours  multivitamin 1 Tablet(s) Oral daily  nystatin Cream 1 Application(s) Topical two times a day  pantoprazole    Tablet 40 milliGRAM(s) Oral two times a day  traZODone 25 milliGRAM(s) Oral at bedtime

## 2020-12-10 NOTE — PROGRESS NOTE ADULT - ASSESSMENT
61yo M with PMHx of CAD s/p PCI to LAD, aortic regurgitation, HTN, thoracic aortic dissection s/p emergent surgery c/b CVA with residual left-sided weakness, colostomy for bowel ischemia s/p reversal, and psoriasis on Humira, who presented to [facility] on 11/02/2020 with worsening SOB with associated chest pressure and LE edema; found to have mod-severe MR and AR, now s/p sternotomy for CABG x2, AVR, and MVR. Hospital Course complicated by SANTOS now requiring HD, acute blood loss anemia s/p multiple PRBCs, and atrial flutter. Admitted for multidisciplinary rehab program.      #Comprehensive Multidisciplinary Rehab Program:- PT/OT/ST 3 hours a day 5 days a week    CAD with AR and MR, s/p sternotomy  with CABG x2, AVR, and MVR  - repeat TTE prior to discharge on 11/23 with EF 40%  - Daily INR; c/w Coumadin  - c/w ASA 81mg daily  - c/w atorvastatin 40mg qhs  - c/w Metoprolol tartrate 50mg q8h    #Bloody BM  - Coumadin held o/n 12/9-10; hgb stable  - prior w/u for anemia included EGD and pouchoscopy on 11/09/2020 at [facility] were unrevealing  - has non-bleeding hemorrhoids  - GI c/s    #Cough  - trial of Hycodan qhs x3 days    #Anemia:- s/p 1 units PRBC on 12/4 with HD  - continue to monitor CBC with HD    Atrial flutter:- c/w Coumadin and Metoprolol, - c/w Amiodarone 200mg daily  - Coumadin held o/n 2/2 bloody BM, will dose for tonight, as H/H stable; pending GI recs    ATN:- s/p Permcath by IR on 11/20  - renal consult; HD MWF  - Retacrit with HD    History of seizures:- c/w Keppra 750mg BID    HLD:- c/w Atorvastatin    Mood/sleep: sleep improved with trazodone.   NP eval noted     Pain:- Tylenol PRN - c/w gabapentin 100mg qhs  - Lidocaine patch daily for right thoracic back pain; massage with therapy    GI/Bowel:- Senna 2 tabs daily  - GI ppx: Protonix 40mg daily    /Bladder: Toileting schedule prn    #Diet: Regular, DASH/TLC, renal    - Nutrition to follow    Skin/ Pressure Injury Prevention:- Incisions: sternotomy and left LE vein harvest incisions  - Turn Q2hrs in bed while awake, OOB to Chair, PT/OT    DVT prophylaxis:- Coumadin    Labs with leucocytosis: Improved    Hospitalist consult noted    Disp: Interdisciplinary Team Rounds, 12/08/2020  - mod assist with bathing; min assist with transfers and stairs. Ambulates 60' with RW; mild cognitive deficit  - TDD 12/15; will need family training prior to discharge

## 2020-12-11 LAB
ANION GAP SERPL CALC-SCNC: 12 MMOL/L — SIGNIFICANT CHANGE UP (ref 5–17)
BUN SERPL-MCNC: 72 MG/DL — HIGH (ref 7–23)
CALCIUM SERPL-MCNC: 8.8 MG/DL — SIGNIFICANT CHANGE UP (ref 8.4–10.5)
CHLORIDE SERPL-SCNC: 100 MMOL/L — SIGNIFICANT CHANGE UP (ref 96–108)
CO2 SERPL-SCNC: 26 MMOL/L — SIGNIFICANT CHANGE UP (ref 22–31)
CREAT SERPL-MCNC: 4.25 MG/DL — HIGH (ref 0.5–1.3)
GLUCOSE SERPL-MCNC: 99 MG/DL — SIGNIFICANT CHANGE UP (ref 70–99)
HCT VFR BLD CALC: 27.6 % — LOW (ref 39–50)
HGB BLD-MCNC: 8.5 G/DL — LOW (ref 13–17)
INR BLD: 2.03 RATIO — HIGH (ref 0.88–1.16)
MCHC RBC-ENTMCNC: 29 PG — SIGNIFICANT CHANGE UP (ref 27–34)
MCHC RBC-ENTMCNC: 30.8 GM/DL — LOW (ref 32–36)
MCV RBC AUTO: 94.2 FL — SIGNIFICANT CHANGE UP (ref 80–100)
NRBC # BLD: 0 /100 WBCS — SIGNIFICANT CHANGE UP (ref 0–0)
PHOSPHATE SERPL-MCNC: 5.3 MG/DL — HIGH (ref 2.5–4.5)
PLATELET # BLD AUTO: 260 K/UL — SIGNIFICANT CHANGE UP (ref 150–400)
POTASSIUM SERPL-MCNC: 3.9 MMOL/L — SIGNIFICANT CHANGE UP (ref 3.5–5.3)
POTASSIUM SERPL-SCNC: 3.9 MMOL/L — SIGNIFICANT CHANGE UP (ref 3.5–5.3)
PROTHROM AB SERPL-ACNC: 23.7 SEC — HIGH (ref 10.6–13.6)
RBC # BLD: 2.93 M/UL — LOW (ref 4.2–5.8)
RBC # FLD: 16.1 % — HIGH (ref 10.3–14.5)
SODIUM SERPL-SCNC: 138 MMOL/L — SIGNIFICANT CHANGE UP (ref 135–145)
WBC # BLD: 9.65 K/UL — SIGNIFICANT CHANGE UP (ref 3.8–10.5)
WBC # FLD AUTO: 9.65 K/UL — SIGNIFICANT CHANGE UP (ref 3.8–10.5)

## 2020-12-11 PROCEDURE — 99232 SBSQ HOSP IP/OBS MODERATE 35: CPT

## 2020-12-11 PROCEDURE — 99232 SBSQ HOSP IP/OBS MODERATE 35: CPT | Mod: GC

## 2020-12-11 RX ORDER — WARFARIN SODIUM 2.5 MG/1
3 TABLET ORAL ONCE
Refills: 0 | Status: COMPLETED | OUTPATIENT
Start: 2020-12-11 | End: 2020-12-11

## 2020-12-11 RX ADMIN — Medication 25 MILLIGRAM(S): at 18:40

## 2020-12-11 RX ADMIN — Medication 6 MILLIGRAM(S): at 21:30

## 2020-12-11 RX ADMIN — LIDOCAINE 1 PATCH: 4 CREAM TOPICAL at 05:37

## 2020-12-11 RX ADMIN — PANTOPRAZOLE SODIUM 40 MILLIGRAM(S): 20 TABLET, DELAYED RELEASE ORAL at 06:06

## 2020-12-11 RX ADMIN — Medication 81 MILLIGRAM(S): at 12:33

## 2020-12-11 RX ADMIN — WARFARIN SODIUM 3 MILLIGRAM(S): 2.5 TABLET ORAL at 21:31

## 2020-12-11 RX ADMIN — ATORVASTATIN CALCIUM 40 MILLIGRAM(S): 80 TABLET, FILM COATED ORAL at 21:30

## 2020-12-11 RX ADMIN — Medication 500 MILLIGRAM(S): at 12:33

## 2020-12-11 RX ADMIN — Medication 25 MILLIGRAM(S): at 06:06

## 2020-12-11 RX ADMIN — LIDOCAINE 1 PATCH: 4 CREAM TOPICAL at 07:30

## 2020-12-11 RX ADMIN — LIDOCAINE 1 PATCH: 4 CREAM TOPICAL at 20:25

## 2020-12-11 RX ADMIN — Medication 975 MILLIGRAM(S): at 21:30

## 2020-12-11 RX ADMIN — LEVETIRACETAM 750 MILLIGRAM(S): 250 TABLET, FILM COATED ORAL at 06:06

## 2020-12-11 RX ADMIN — NYSTATIN CREAM 1 APPLICATION(S): 100000 CREAM TOPICAL at 21:31

## 2020-12-11 RX ADMIN — Medication 1 MILLIGRAM(S): at 12:33

## 2020-12-11 RX ADMIN — PANTOPRAZOLE SODIUM 40 MILLIGRAM(S): 20 TABLET, DELAYED RELEASE ORAL at 18:40

## 2020-12-11 RX ADMIN — Medication 1 TABLET(S): at 12:33

## 2020-12-11 RX ADMIN — AMIODARONE HYDROCHLORIDE 200 MILLIGRAM(S): 400 TABLET ORAL at 06:06

## 2020-12-11 RX ADMIN — LEVETIRACETAM 750 MILLIGRAM(S): 250 TABLET, FILM COATED ORAL at 18:40

## 2020-12-11 RX ADMIN — Medication 975 MILLIGRAM(S): at 22:30

## 2020-12-11 RX ADMIN — ERYTHROPOIETIN 10000 UNIT(S): 10000 INJECTION, SOLUTION INTRAVENOUS; SUBCUTANEOUS at 16:42

## 2020-12-11 RX ADMIN — NYSTATIN CREAM 1 APPLICATION(S): 100000 CREAM TOPICAL at 06:07

## 2020-12-11 RX ADMIN — GABAPENTIN 100 MILLIGRAM(S): 400 CAPSULE ORAL at 21:30

## 2020-12-11 RX ADMIN — LIDOCAINE 1 PATCH: 4 CREAM TOPICAL at 19:36

## 2020-12-11 NOTE — PROGRESS NOTE ADULT - SUBJECTIVE AND OBJECTIVE BOX
Patient is a 60y old  Male who presents with a chief complaint of Impairments: AVR  Impairment Codes: 09 Cardiac (11 Dec 2020 08:04)      Patient seen and examined at bedside. No overnight events.   Participating in therapy - reports significant improvement. States he walked alone 195 ft, stated with a walker and was able to ambulate independently in the hallway. Optimistic/ excited about his improvement.   Eating all meals. Had BM. Voiding well.   No further bloody BMs.     ALLERGIES:  penicillin (Unknown)    MEDICATIONS  (STANDING):  acetaminophen   Tablet .. 975 milliGRAM(s) Oral <User Schedule>  aMIOdarone    Tablet 200 milliGRAM(s) Oral daily  ascorbic acid 500 milliGRAM(s) Oral daily  aspirin enteric coated 81 milliGRAM(s) Oral daily  atorvastatin 40 milliGRAM(s) Oral at bedtime  epoetin yolanda-epbx (RETACRIT) Injectable 30673 Unit(s) IV Push <User Schedule>  folic acid 1 milliGRAM(s) Oral daily  gabapentin 100 milliGRAM(s) Oral at bedtime  hydrocodone/homatropine Syrup 5 milliLiter(s) Oral at bedtime  influenza   Vaccine 0.5 milliLiter(s) IntraMuscular once  iron sucrose IVPB 100 milliGRAM(s) IV Intermittent every 7 days  levETIRAcetam 750 milliGRAM(s) Oral two times a day  lidocaine   Patch 1 Patch Transdermal daily  melatonin 6 milliGRAM(s) Oral at bedtime  metoprolol tartrate 25 milliGRAM(s) Oral every 12 hours  multivitamin 1 Tablet(s) Oral daily  nystatin Cream 1 Application(s) Topical two times a day  pantoprazole    Tablet 40 milliGRAM(s) Oral two times a day    MEDICATIONS  (PRN):  acetaminophen   Tablet .. 650 milliGRAM(s) Oral every 6 hours PRN Temp greater or equal to 38C (100.4F), Mild Pain (1 - 3)  ALBUTerol    90 MICROgram(s) HFA Inhaler 2 Puff(s) Inhalation every 6 hours PRN Shortness of Breath and/or Wheezing  benzocaine 15 mG/menthol 3.6 mG (Sugar-Free) Lozenge 1 Lozenge Oral two times a day PRN Sore Throat  guaiFENesin   Syrup  (Sugar-Free) 200 milliGRAM(s) Oral every 6 hours PRN Cough    Vital Signs Last 24 Hrs  T(F): 98 (11 Dec 2020 10:34), Max: 98.3 (10 Dec 2020 19:57)  HR: 77 (11 Dec 2020 10:34) (77 - 98)  BP: 104/75 (11 Dec 2020 10:34) (104/75 - 147/98)  RR: 16 (11 Dec 2020 10:34) (16 - 16)  SpO2: 96% (10 Dec 2020 19:57) (96% - 96%)  I&O's Summary    10 Dec 2020 07:01  -  11 Dec 2020 07:00  --------------------------------------------------------  IN: 0 mL / OUT: 2 mL / NET: -2 mL          PHYSICAL EXAM:  General: NAD, A/O x 3  ENT: MMM, no scleral icterus  Neck: Supple, No JVD  Lungs: Clear to auscultation bilaterally, no wheezes, rales, rhonchi  Cardio: RRR, S1/S2, No murmurs  Abdomen: Soft, Nontender, Nondistended; Bowel sounds present  Extremities: No calf tenderness, No pitting edema    LABS:                        9.0    7.03  )-----------( 209      ( 10 Dec 2020 05:35 )             29.8       12-09    139  |  101  |  81  ----------------------------<  108  3.7   |  23  |  4.00    Ca    8.2      09 Dec 2020 14:30  Phos  4.7     12-09       eGFR if Non African American: 15 mL/min/1.73M2 (12-09-20 @ 14:30)  eGFR if : 18 mL/min/1.73M2 (12-09-20 @ 14:30)    PT/INR - ( 10 Dec 2020 05:35 )   PT: 21.7 sec;   INR: 1.85 ratio                  11-15 Chol -- LDL -- HDL -- Trig 105 mg/dL                        RADIOLOGY & ADDITIONAL TESTS:    Care Discussed with Consultants/Other Providers: Dr. Palafox (physiatry)

## 2020-12-11 NOTE — PROGRESS NOTE ADULT - ATTENDING COMMENTS
Chart reviewed. Patient seen at bedside  Slept well  Cough much improved.   No further bleeding per rectum  Right hand numbness unchanged    2. Continue rehab program

## 2020-12-11 NOTE — PROGRESS NOTE ADULT - SUBJECTIVE AND OBJECTIVE BOX
DAILY PROGRESS NOTE:  HPI:  JONATHAN GIBSON is a 61yo M with PMHx of CAD s/p PCI to LAD, aortic regurgitation, HTN, thoracic aortic dissection s/p emergent surgery c/b CVA with residual left-sided weakness, colostomy for bowel ischemia s/p reversal, and psoriasis on Humira, who presented to [facility] on 2020 with worsening SOB with associated chest pressure and LE edema. In the ED, had elevated pro-BNP >33k, SCr, acute anemia, and HTN emergency. Evaluated on admission by cardiology; symptoms improved following IV Lasix. Echocardiogram on admission showing EF 50-55%, enlarged LA, mod-sever MR, mod TR, mod-severe AR, and enlarged aortic root.     Patient evaluated by CT surgery for potential AVR/MVR; required CVVHD medical optimization. GI consulted for acute anemia; patient underwent EGD on  with no significant findings. Patient underwent sternotomy for CABG x2, AVR, and MVR on ; post-op c/b acute blood loss anemia requiring multiple blood products, pressors, and inotropes. Patient was extubated on  and underwent permacath placement with IR on . Pressors and inotropes weaned off and transferred to floors on . Repeat TTE with doppler showing EF 40% with mod LV systolic dysfunction.    Patient was evaluated by PM&R and therapy for functional deficits and gait/ ADL impairments and recommended acute rehabilitation. Patient was medically optimized for discharge to [facility] on 2020.    Patient was seen and examined at the bedside on arrival. Denied HA, fever, chills, coughing, wheezing, SOB, or abdominal pain. Last BM was this morning. Appetite has returned, requesting dinner. Endorses some weakness and numbness on the left LE 2/2 residual deficits from prior stroke. Had some low back pain prior to arrival, but now resolved.  (2020 15:16)      Subjective:  Patient was seen and examined at the bedside this AM. No acute overnight events.       Physical Exam:  Vital Signs Last 24 Hrs  T(C): 36.8 (10 Dec 2020 19:57), Max: 36.9 (10 Dec 2020 09:14)  T(F): 98.3 (10 Dec 2020 19:57), Max: 98.4 (10 Dec 2020 09:14)  HR: 77 (11 Dec 2020 06:05) (77 - 98)  BP: 123/68 (11 Dec 2020 06:05) (113/68 - 147/98)  RR: 16 (10 Dec 2020 19:57) (15 - 16)  SpO2: 96% (10 Dec 2020 19:57) (95% - 96%)    12-10-20 @ 07:01  -  20 @ 07:00  --------------------------------------------------------  IN: 0 mL / OUT: 2 mL / NET: -2 mL      Constitutional - NAD, Comfortable  Chest - CTAB  Cardiovascular - RRR  Abdomen - BS+, Soft, NTND  Extremities - LE edema much improved   Motor - left weakness from prior stroke   Psychiatric - Mood stable, Affect WNL  Skin:  Sternal incision from sternotomy,c/d/i. Left LE incision from vein harvesting, staples in place +; appears c/d/i. Right inner thigh incision, as well with staples in place; appears c/d/i. Stage 1 sacral lesion  Lines: Right IJ Permacath on the right chest wall. Midline in right medial arm      Recent Labs/Imagin.0    7.03  )-----------( 209      ( 10 Dec 2020 05:35 )             29.8     12-    139  |  101  |  81<H>  ----------------------------<  108<H>  3.7   |  23  |  4.00<H>    Ca    8.2<L>      09 Dec 2020 14:30  Phos  4.7     12-09  PT/INR - ( 10 Dec 2020 05:35 )   PT: 21.7 sec;   INR: 1.85 ratio           Medications:  acetaminophen   Tablet .. 975 milliGRAM(s) Oral <User Schedule>  acetaminophen   Tablet .. 650 milliGRAM(s) Oral every 6 hours PRN  ALBUTerol    90 MICROgram(s) HFA Inhaler 2 Puff(s) Inhalation every 6 hours PRN  aMIOdarone    Tablet 200 milliGRAM(s) Oral daily  ascorbic acid 500 milliGRAM(s) Oral daily  aspirin enteric coated 81 milliGRAM(s) Oral daily  atorvastatin 40 milliGRAM(s) Oral at bedtime  benzocaine 15 mG/menthol 3.6 mG (Sugar-Free) Lozenge 1 Lozenge Oral two times a day PRN  epoetin yolanda-epbx (RETACRIT) Injectable 39474 Unit(s) IV Push <User Schedule>  folic acid 1 milliGRAM(s) Oral daily  gabapentin 100 milliGRAM(s) Oral at bedtime  guaiFENesin   Syrup  (Sugar-Free) 200 milliGRAM(s) Oral every 6 hours PRN  hydrocodone/homatropine Syrup 5 milliLiter(s) Oral at bedtime  influenza   Vaccine 0.5 milliLiter(s) IntraMuscular once  iron sucrose IVPB 100 milliGRAM(s) IV Intermittent every 7 days  levETIRAcetam 750 milliGRAM(s) Oral two times a day  lidocaine   Patch 1 Patch Transdermal daily  melatonin 6 milliGRAM(s) Oral at bedtime  metoprolol tartrate 25 milliGRAM(s) Oral every 12 hours  multivitamin 1 Tablet(s) Oral daily  nystatin Cream 1 Application(s) Topical two times a day  pantoprazole    Tablet 40 milliGRAM(s) Oral two times a day DAILY PROGRESS NOTE:  HPI:  JONATHAN GIBSON is a 59yo M with PMHx of CAD s/p PCI to LAD, aortic regurgitation, HTN, thoracic aortic dissection s/p emergent surgery c/b CVA with residual left-sided weakness, colostomy for bowel ischemia s/p reversal, and psoriasis on Humira, who presented to [facility] on 2020 with worsening SOB with associated chest pressure and LE edema. In the ED, had elevated pro-BNP >33k, SCr, acute anemia, and HTN emergency. Evaluated on admission by cardiology; symptoms improved following IV Lasix. Echocardiogram on admission showing EF 50-55%, enlarged LA, mod-sever MR, mod TR, mod-severe AR, and enlarged aortic root.     Patient evaluated by CT surgery for potential AVR/MVR; required CVVHD medical optimization. GI consulted for acute anemia; patient underwent EGD on  with no significant findings. Patient underwent sternotomy for CABG x2, AVR, and MVR on ; post-op c/b acute blood loss anemia requiring multiple blood products, pressors, and inotropes. Patient was extubated on  and underwent permacath placement with IR on . Pressors and inotropes weaned off and transferred to floors on . Repeat TTE with doppler showing EF 40% with mod LV systolic dysfunction.    Patient was evaluated by PM&R and therapy for functional deficits and gait/ ADL impairments and recommended acute rehabilitation. Patient was medically optimized for discharge to [facility] on 2020.    Patient was seen and examined at the bedside on arrival. Denied HA, fever, chills, coughing, wheezing, SOB, or abdominal pain. Last BM was this morning. Appetite has returned, requesting dinner. Endorses some weakness and numbness on the left LE 2/2 residual deficits from prior stroke. Had some low back pain prior to arrival, but now resolved.  (2020 15:16)      Subjective:  Patient was seen and examined at the bedside this AM. No acute overnight events. Slept well last night, did not cough. States heat was also turned down, so there was not as much "dry heat"; also received first dose standing Hycodan. No further bloody BMs. No other complaints at this time. Denied coughing, SOB, CP, or abdominal pain; having usual BMs.       Physical Exam:  Vital Signs Last 24 Hrs  T(C): 36.8 (10 Dec 2020 19:57), Max: 36.9 (10 Dec 2020 09:14)  T(F): 98.3 (10 Dec 2020 19:57), Max: 98.4 (10 Dec 2020 09:14)  HR: 77 (11 Dec 2020 06:05) (77 - 98)  BP: 123/68 (11 Dec 2020 06:05) (113/68 - 147/98)  RR: 16 (10 Dec 2020 19:57) (15 - 16)  SpO2: 96% (10 Dec 2020 19:57) (95% - 96%)    12-10-20 @ 07:01  -  20 @ 07:00  --------------------------------------------------------  IN: 0 mL / OUT: 2 mL / NET: -2 mL      Constitutional - NAD, Comfortable  Chest - CTAB  Cardiovascular - RRR  Abdomen - BS+, Soft, NTND  Extremities - LE edema much improved   Motor - left weakness from prior stroke   Psychiatric - Mood stable, Affect WNL  Skin:  Sternal incision from sternotomy,c/d/i. Left LE incision from vein harvesting, staples in place +; appears c/d/i. Right inner thigh incision, as well with staples in place; appears c/d/i. Stage 1 sacral lesion  Lines: Right IJ Permacath on the right chest wall. Midline in right medial arm      Recent Labs/Imagin.0    7.03  )-----------( 209      ( 10 Dec 2020 05:35 )             29.8     12-09    139  |  101  |  81<H>  ----------------------------<  108<H>  3.7   |  23  |  4.00<H>    Ca    8.2<L>      09 Dec 2020 14:30  Phos  4.7     12-09  PT/INR - ( 10 Dec 2020 05:35 )   PT: 21.7 sec;   INR: 1.85 ratio           Medications:  acetaminophen   Tablet .. 975 milliGRAM(s) Oral <User Schedule>  acetaminophen   Tablet .. 650 milliGRAM(s) Oral every 6 hours PRN  ALBUTerol    90 MICROgram(s) HFA Inhaler 2 Puff(s) Inhalation every 6 hours PRN  aMIOdarone    Tablet 200 milliGRAM(s) Oral daily  ascorbic acid 500 milliGRAM(s) Oral daily  aspirin enteric coated 81 milliGRAM(s) Oral daily  atorvastatin 40 milliGRAM(s) Oral at bedtime  benzocaine 15 mG/menthol 3.6 mG (Sugar-Free) Lozenge 1 Lozenge Oral two times a day PRN  epoetin yolanda-epbx (RETACRIT) Injectable 22958 Unit(s) IV Push <User Schedule>  folic acid 1 milliGRAM(s) Oral daily  gabapentin 100 milliGRAM(s) Oral at bedtime  guaiFENesin   Syrup  (Sugar-Free) 200 milliGRAM(s) Oral every 6 hours PRN  hydrocodone/homatropine Syrup 5 milliLiter(s) Oral at bedtime  influenza   Vaccine 0.5 milliLiter(s) IntraMuscular once  iron sucrose IVPB 100 milliGRAM(s) IV Intermittent every 7 days  levETIRAcetam 750 milliGRAM(s) Oral two times a day  lidocaine   Patch 1 Patch Transdermal daily  melatonin 6 milliGRAM(s) Oral at bedtime  metoprolol tartrate 25 milliGRAM(s) Oral every 12 hours  multivitamin 1 Tablet(s) Oral daily  nystatin Cream 1 Application(s) Topical two times a day  pantoprazole    Tablet 40 milliGRAM(s) Oral two times a day

## 2020-12-11 NOTE — PROGRESS NOTE ADULT - ASSESSMENT
60M with PMH CAD s/p PCI to LAD, aortic regurgitation, HTN, thoracic aortic dissection s/p emergent surgery c/b CVA with residual left-sided weakness, colostomy for bowel ischemia s/p reversal, and psoriasis on Humira, presented to [facility] for acute rehab. Post op complications with acute blood loss anemia requiring multiple blood products.    #CAD with AR and MR   #Combined systolic/diastolic CHF  #Debility  -Continue comprehensive rehab program  -s/p sternotomy  with CABG x2, AVR, and MVR 11/13  -Continue Aspirin, Metoprolol, Lipitor    #Atrial flutter  -Continue Metoprolol, Amiodarone   -Continue AC with Coumadin.  -INR sub therapeutic. Goal 2-3. Dose Coumadin tonight.     #ESRD on HD due to ATN  #Anemia  -HD MWF  -Nephrology on board  -Avoid nephrotoxic agents  -Venofer with HD, Retacrit   -Maintain Hb>8    #Seizure Disorder  -No recent seizures  -Continue Keppra BID     #Bright red blood per rectum - resolved  -Low suspicion for GI bleed  -H/H stable, no overt signs of bleeding  -No further episodes    #Prophylactic Measure  -DVT prophylaxis: sub therapeutic on Coumadin. Coumadin tonight.

## 2020-12-11 NOTE — PROGRESS NOTE ADULT - SUBJECTIVE AND OBJECTIVE BOX
Seen on HD    Vital Signs Last 24 Hrs  T(C): 36.7 (12-11-20 @ 10:34), Max: 36.8 (12-10-20 @ 19:57)  T(F): 98 (12-11-20 @ 10:34), Max: 98.3 (12-10-20 @ 19:57)  HR: 77 (12-11-20 @ 10:34) (77 - 98)  BP: 104/75 (12-11-20 @ 10:34) (104/75 - 147/98)  RR: 16 (12-11-20 @ 10:34) (16 - 16)  SpO2: 96% (12-10-20 @ 19:57) (96% - 96%)    s1s2  b/l air entry  soft, NT, ND  + edema                                                                             8.5    9.65  )-----------( 260      ( 11 Dec 2020 15:10 )             27.6     11 Dec 2020 15:10    138    |  100    |  72     ----------------------------<  99     3.9     |  26     |  4.25     Ca    8.8        11 Dec 2020 15:10  Phos  5.3       11 Dec 2020 15:10    PT/INR - ( 11 Dec 2020 16:40 )   PT: 23.7 sec;   INR: 2.03 ratio      acetaminophen   Tablet .. 650 milliGRAM(s) Oral every 6 hours PRN  acetaminophen   Tablet .. 975 milliGRAM(s) Oral <User Schedule>  ALBUTerol    90 MICROgram(s) HFA Inhaler 2 Puff(s) Inhalation every 6 hours PRN  aMIOdarone    Tablet 200 milliGRAM(s) Oral daily  ascorbic acid 500 milliGRAM(s) Oral daily  aspirin enteric coated 81 milliGRAM(s) Oral daily  atorvastatin 40 milliGRAM(s) Oral at bedtime  benzocaine 15 mG/menthol 3.6 mG (Sugar-Free) Lozenge 1 Lozenge Oral two times a day PRN  epoetin yolanda-epbx (RETACRIT) Injectable 85389 Unit(s) IV Push <User Schedule>  folic acid 1 milliGRAM(s) Oral daily  gabapentin 100 milliGRAM(s) Oral at bedtime  guaiFENesin   Syrup  (Sugar-Free) 200 milliGRAM(s) Oral every 6 hours PRN  hydrocodone/homatropine Syrup 5 milliLiter(s) Oral at bedtime  influenza   Vaccine 0.5 milliLiter(s) IntraMuscular once  iron sucrose IVPB 100 milliGRAM(s) IV Intermittent every 7 days  levETIRAcetam 750 milliGRAM(s) Oral two times a day  lidocaine   Patch 1 Patch Transdermal daily  melatonin 6 milliGRAM(s) Oral at bedtime  metoprolol tartrate 25 milliGRAM(s) Oral every 12 hours  multivitamin 1 Tablet(s) Oral daily  nystatin Cream 1 Application(s) Topical two times a day  pantoprazole    Tablet 40 milliGRAM(s) Oral two times a day  warfarin 3 milliGRAM(s) Oral once    A/P:    S/p CABG x 2, AVR, MVR - 11/13/20  Complicated hospital course as per HPI  Now on HD MWF  TMP as able w/HD  Epogen w/HD 3 x week  Rehab    [phone]

## 2020-12-11 NOTE — PROGRESS NOTE ADULT - ASSESSMENT
59yo M with PMHx of CAD s/p PCI to LAD, aortic regurgitation, HTN, thoracic aortic dissection s/p emergent surgery c/b CVA with residual left-sided weakness, colostomy for bowel ischemia s/p reversal, and psoriasis on Humira, who presented to [facility] on 11/02/2020 with worsening SOB with associated chest pressure and LE edema; found to have mod-severe MR and AR, now s/p sternotomy for CABG x2, AVR, and MVR. Hospital Course complicated by SANTOS now requiring HD, acute blood loss anemia s/p multiple PRBCs, and atrial flutter. Admitted for multidisciplinary rehab program.      #Comprehensive Multidisciplinary Rehab Program:- PT/OT/ST 3 hours a day 5 days a week    CAD with AR and MR, s/p sternotomy  with CABG x2, AVR, and MVR  - repeat TTE prior to discharge on 11/23 with EF 40%  - Daily INR; c/w Coumadin  - c/w ASA 81mg daily  - c/w atorvastatin 40mg qhs  - c/w Metoprolol tartrate 50mg q8h    #Bloody BM  - Coumadin held o/n 12/9-10; hgb stable  - prior w/u for anemia included EGD and pouchoscopy on 11/09/2020 at [facility] were unrevealing  - has non-bleeding hemorrhoids  - GI c/s    #Cough  - trial of Hycodan qhs x3 days    #Anemia:- s/p 1 units PRBC on 12/4 with HD  - continue to monitor CBC with HD    Atrial flutter:- c/w Coumadin and Metoprolol, - c/w Amiodarone 200mg daily  - Coumadin held o/n 2/2 bloody BM, will dose for tonight, as H/H stable; pending GI recs    ATN:- s/p Permcath by IR on 11/20  - renal consult; HD MWF  - Retacrit with HD    History of seizures:- c/w Keppra 750mg BID    HLD:- c/w Atorvastatin    Mood/sleep: sleep improved with trazodone.   NP eval noted     Pain:- Tylenol PRN - c/w gabapentin 100mg qhs  - Lidocaine patch daily for right thoracic back pain; massage with therapy    GI/Bowel:- Senna 2 tabs daily  - GI ppx: Protonix 40mg daily    /Bladder: Toileting schedule prn    #Diet: Regular, DASH/TLC, renal    - Nutrition to follow    Skin/ Pressure Injury Prevention:- Incisions: sternotomy and left LE vein harvest incisions  - Turn Q2hrs in bed while awake, OOB to Chair, PT/OT    DVT prophylaxis:- Coumadin    Labs with leucocytosis: Improved    Hospitalist consult noted    Disp: Interdisciplinary Team Rounds, 12/08/2020  - mod assist with bathing; min assist with transfers and stairs. Ambulates 60' with RW; mild cognitive deficit  - TDD 12/15; will need family training prior to discharge 61yo M with PMHx of CAD s/p PCI to LAD, aortic regurgitation, HTN, thoracic aortic dissection s/p emergent surgery c/b CVA with residual left-sided weakness, colostomy for bowel ischemia s/p reversal, and psoriasis on Humira, who presented to [facility] on 11/02/2020 with worsening SOB with associated chest pressure and LE edema; found to have mod-severe MR and AR, now s/p sternotomy for CABG x2, AVR, and MVR. Hospital Course complicated by SANTOS now requiring HD, acute blood loss anemia s/p multiple PRBCs, and atrial flutter. Admitted for multidisciplinary rehab program.      #Comprehensive Multidisciplinary Rehab Program:- PT/OT/ST 3 hours a day 5 days a week    CAD with AR and MR, s/p sternotomy  with CABG x2, AVR, and MVR  - repeat TTE prior to discharge on 11/23 with EF 40%  - Daily INR; c/w Coumadin  - c/w ASA 81mg daily  - c/w atorvastatin 40mg qhs  - c/w Metoprolol tartrate 50mg q8h    #Bloody BM  - Coumadin held o/n 12/9-10; hgb stable  - prior w/u for anemia included EGD and pouchoscopy on 11/09/2020 at [facility] were unrevealing  - has non-bleeding hemorrhoids. No further episodes; continue to monitor for now  - GI c/s    #Cough  - trial of Hycodan qhs x3 days; improved on Hycodan for now    #Anemia:- s/p 1 units PRBC on 12/4 with HD  - continue to monitor CBC with HD    Atrial flutter:- c/w Coumadin and Metoprolol, - c/w Amiodarone 200mg daily  - Coumadin qhs    ATN:- s/p Permcath by IR on 11/20  - renal consult; HD MWF  - Retacrit with HD    History of seizures:- c/w Keppra 750mg BID    HLD:- c/w Atorvastatin    Mood/sleep: sleep improved with trazodone.   NP eval noted     Pain:- Tylenol PRN - c/w gabapentin 100mg qhs  - Lidocaine patch daily for right thoracic back pain; massage with therapy    GI/Bowel:- Senna 2 tabs daily  - GI ppx: Protonix 40mg daily    /Bladder: Toileting schedule prn    #Diet: Regular, DASH/TLC, renal    - Nutrition to follow    Skin/ Pressure Injury Prevention:- Incisions: sternotomy and left LE vein harvest incisions  - Turn Q2hrs in bed while awake, OOB to Chair, PT/OT    DVT prophylaxis:- Coumadin    Labs with leucocytosis: Improved    Hospitalist consult noted    Disp: Interdisciplinary Team Rounds, 12/08/2020  - mod assist with bathing; min assist with transfers and stairs. Ambulates 60' with RW; mild cognitive deficit  - TDD 12/15; will need family training prior to discharge

## 2020-12-12 LAB
INR BLD: 1.97 RATIO — HIGH (ref 0.88–1.16)
PROTHROM AB SERPL-ACNC: 23.1 SEC — HIGH (ref 10.6–13.6)

## 2020-12-12 PROCEDURE — 99232 SBSQ HOSP IP/OBS MODERATE 35: CPT

## 2020-12-12 RX ORDER — WARFARIN SODIUM 2.5 MG/1
3 TABLET ORAL ONCE
Refills: 0 | Status: COMPLETED | OUTPATIENT
Start: 2020-12-12 | End: 2020-12-12

## 2020-12-12 RX ADMIN — Medication 25 MILLIGRAM(S): at 05:56

## 2020-12-12 RX ADMIN — NYSTATIN CREAM 1 APPLICATION(S): 100000 CREAM TOPICAL at 05:58

## 2020-12-12 RX ADMIN — AMIODARONE HYDROCHLORIDE 200 MILLIGRAM(S): 400 TABLET ORAL at 05:56

## 2020-12-12 RX ADMIN — PANTOPRAZOLE SODIUM 40 MILLIGRAM(S): 20 TABLET, DELAYED RELEASE ORAL at 05:56

## 2020-12-12 RX ADMIN — WARFARIN SODIUM 3 MILLIGRAM(S): 2.5 TABLET ORAL at 21:40

## 2020-12-12 RX ADMIN — LEVETIRACETAM 750 MILLIGRAM(S): 250 TABLET, FILM COATED ORAL at 17:40

## 2020-12-12 RX ADMIN — Medication 81 MILLIGRAM(S): at 11:37

## 2020-12-12 RX ADMIN — INFLUENZA VIRUS VACCINE 0.5 MILLILITER(S): 15; 15; 15; 15 SUSPENSION INTRAMUSCULAR at 09:28

## 2020-12-12 RX ADMIN — Medication 500 MILLIGRAM(S): at 11:39

## 2020-12-12 RX ADMIN — ATORVASTATIN CALCIUM 40 MILLIGRAM(S): 80 TABLET, FILM COATED ORAL at 21:40

## 2020-12-12 RX ADMIN — Medication 25 MILLIGRAM(S): at 17:40

## 2020-12-12 RX ADMIN — NYSTATIN CREAM 1 APPLICATION(S): 100000 CREAM TOPICAL at 17:42

## 2020-12-12 RX ADMIN — Medication 6 MILLIGRAM(S): at 21:40

## 2020-12-12 RX ADMIN — Medication 975 MILLIGRAM(S): at 22:15

## 2020-12-12 RX ADMIN — GABAPENTIN 100 MILLIGRAM(S): 400 CAPSULE ORAL at 21:40

## 2020-12-12 RX ADMIN — PANTOPRAZOLE SODIUM 40 MILLIGRAM(S): 20 TABLET, DELAYED RELEASE ORAL at 17:40

## 2020-12-12 RX ADMIN — Medication 1 MILLIGRAM(S): at 11:38

## 2020-12-12 RX ADMIN — Medication 975 MILLIGRAM(S): at 21:39

## 2020-12-12 RX ADMIN — Medication 1 TABLET(S): at 11:38

## 2020-12-12 RX ADMIN — LEVETIRACETAM 750 MILLIGRAM(S): 250 TABLET, FILM COATED ORAL at 05:56

## 2020-12-12 NOTE — PROGRESS NOTE ADULT - SUBJECTIVE AND OBJECTIVE BOX
Cc: Gait dysfunction    HPI: Patient with no new medical issues today.  INR 1.97  Pain controlled, no chest pain, no N/V, no Fevers/Chills. No other new ROS  Has been tolerating rehabilitation program.    acetaminophen   Tablet .. 975 milliGRAM(s) Oral <User Schedule>  acetaminophen   Tablet .. 650 milliGRAM(s) Oral every 6 hours PRN  ALBUTerol    90 MICROgram(s) HFA Inhaler 2 Puff(s) Inhalation every 6 hours PRN  aMIOdarone    Tablet 200 milliGRAM(s) Oral daily  ascorbic acid 500 milliGRAM(s) Oral daily  aspirin enteric coated 81 milliGRAM(s) Oral daily  atorvastatin 40 milliGRAM(s) Oral at bedtime  benzocaine 15 mG/menthol 3.6 mG (Sugar-Free) Lozenge 1 Lozenge Oral two times a day PRN  epoetin yolanda-epbx (RETACRIT) Injectable 92566 Unit(s) IV Push <User Schedule>  folic acid 1 milliGRAM(s) Oral daily  gabapentin 100 milliGRAM(s) Oral at bedtime  guaiFENesin   Syrup  (Sugar-Free) 200 milliGRAM(s) Oral every 6 hours PRN  hydrocodone/homatropine Syrup 5 milliLiter(s) Oral at bedtime  iron sucrose IVPB 100 milliGRAM(s) IV Intermittent every 7 days  levETIRAcetam 750 milliGRAM(s) Oral two times a day  lidocaine   Patch 1 Patch Transdermal daily  melatonin 6 milliGRAM(s) Oral at bedtime  metoprolol tartrate 25 milliGRAM(s) Oral every 12 hours  multivitamin 1 Tablet(s) Oral daily  nystatin Cream 1 Application(s) Topical two times a day  pantoprazole    Tablet 40 milliGRAM(s) Oral two times a day  warfarin 3 milliGRAM(s) Oral once      T(C): 36.8 (12-12-20 @ 08:10), Max: 37.2 (12-11-20 @ 22:05)  HR: 67 (12-12-20 @ 08:10) (67 - 79)  BP: 101/68 (12-12-20 @ 08:10) (101/68 - 149/85)  RR: 16 (12-12-20 @ 08:10) (16 - 16)  SpO2: 96% (12-12-20 @ 08:10) (96% - 97%)    In NAD  HEENT- EOMI  Heart- RRR, S1S2  Lungs- CTA bl.  Abd- + BS, NT  Ext- No calf pain  Neuro- Exam unchanged          Imp: Patient with diagnosis of CAD s/p CABG, AVR and MVR admitted for comprehensive acute rehabilitation.    Plan:  - Continue therapies  - DVT prophylaxis, continue warfarin and trend INR  - Skin- Turn q2h, check skin daily  - Continue current medications; patient medically stable.   - Patient is stable to continue current rehabilitation program.

## 2020-12-12 NOTE — PROGRESS NOTE ADULT - SUBJECTIVE AND OBJECTIVE BOX
Patient is a 60y old  Male who presents with a chief complaint of Impairments: AVR  Impairment Codes: 09 Cardiac (11 Dec 2020 17:47)      Patient seen and examined at bedside. No overnight events.     ALLERGIES:  penicillin (Unknown)    MEDICATIONS  (STANDING):  acetaminophen   Tablet .. 975 milliGRAM(s) Oral <User Schedule>  aMIOdarone    Tablet 200 milliGRAM(s) Oral daily  ascorbic acid 500 milliGRAM(s) Oral daily  aspirin enteric coated 81 milliGRAM(s) Oral daily  atorvastatin 40 milliGRAM(s) Oral at bedtime  epoetin yolanda-epbx (RETACRIT) Injectable 19624 Unit(s) IV Push <User Schedule>  folic acid 1 milliGRAM(s) Oral daily  gabapentin 100 milliGRAM(s) Oral at bedtime  hydrocodone/homatropine Syrup 5 milliLiter(s) Oral at bedtime  iron sucrose IVPB 100 milliGRAM(s) IV Intermittent every 7 days  levETIRAcetam 750 milliGRAM(s) Oral two times a day  lidocaine   Patch 1 Patch Transdermal daily  melatonin 6 milliGRAM(s) Oral at bedtime  metoprolol tartrate 25 milliGRAM(s) Oral every 12 hours  multivitamin 1 Tablet(s) Oral daily  nystatin Cream 1 Application(s) Topical two times a day  pantoprazole    Tablet 40 milliGRAM(s) Oral two times a day  warfarin 3 milliGRAM(s) Oral once    MEDICATIONS  (PRN):  acetaminophen   Tablet .. 650 milliGRAM(s) Oral every 6 hours PRN Temp greater or equal to 38C (100.4F), Mild Pain (1 - 3)  ALBUTerol    90 MICROgram(s) HFA Inhaler 2 Puff(s) Inhalation every 6 hours PRN Shortness of Breath and/or Wheezing  benzocaine 15 mG/menthol 3.6 mG (Sugar-Free) Lozenge 1 Lozenge Oral two times a day PRN Sore Throat  guaiFENesin   Syrup  (Sugar-Free) 200 milliGRAM(s) Oral every 6 hours PRN Cough    Vital Signs Last 24 Hrs  T(F): 98.2 (12 Dec 2020 08:10), Max: 98.9 (11 Dec 2020 22:05)  HR: 67 (12 Dec 2020 08:10) (67 - 79)  BP: 101/68 (12 Dec 2020 08:10) (101/68 - 149/85)  RR: 16 (12 Dec 2020 08:10) (16 - 16)  SpO2: 96% (12 Dec 2020 08:10) (96% - 97%)  I&O's Summary    11 Dec 2020 07:01  -  12 Dec 2020 07:00  --------------------------------------------------------  IN: 800 mL / OUT: 3800 mL / NET: -3000 mL          PHYSICAL EXAM:  General: NAD, A/O x 3  ENT: MMM, no scleral icterus  Neck: Supple, No JVD  Lungs: Clear to auscultation bilaterally, no wheezes, rales, rhonchi  Cardio: RRR, S1/S2, No murmurs  Abdomen: Soft, Nontender, Nondistended; Bowel sounds present  Extremities: No calf tenderness, No pitting edema    LABS:                        8.5    9.65  )-----------( 260      ( 11 Dec 2020 15:10 )             27.6       12-11    138  |  100  |  72  ----------------------------<  99  3.9   |  26  |  4.25    Ca    8.8      11 Dec 2020 15:10  Phos  5.3     12-11       eGFR if Non African American: 14 mL/min/1.73M2 (12-11-20 @ 15:10)  eGFR if : 17 mL/min/1.73M2 (12-11-20 @ 15:10)    PT/INR - ( 12 Dec 2020 06:00 )   PT: 23.1 sec;   INR: 1.97 ratio                  11-15 Chol -- LDL -- HDL -- Trig 105 mg/dL

## 2020-12-12 NOTE — CONSULT NOTE ADULT - REASON FOR ADMISSION
Impairments: AVR  Impairment Codes: 09 Cardiac

## 2020-12-12 NOTE — PROGRESS NOTE ADULT - ASSESSMENT
60M with PMH CAD s/p PCI to LAD, aortic regurgitation, HTN, thoracic aortic dissection s/p emergent surgery c/b CVA with residual left-sided weakness, colostomy for bowel ischemia s/p reversal, and psoriasis on Humira, presented to [facility] for acute rehab. Post op complications with acute blood loss anemia requiring multiple blood products.    #CAD with AR and MR   #Combined systolic/diastolic CHF  #Debility  -Continue comprehensive rehab program  -s/p sternotomy  with CABG x2, AVR, and MVR 11/13  -Continue Aspirin, Metoprolol, Lipitor    #Atrial flutter  -Continue Metoprolol, Amiodarone   -Continue AC with Coumadin.  -Goal INR 2-3. Dose Coumadin tonight.     #ESRD on HD due to ATN  #Anemia  -HD MWF  -Nephrology on board  -Avoid nephrotoxic agents  -Venofer with HD, Retacrit   -Maintain Hb>8    #Seizure Disorder  -No recent seizures  -Continue Keppra BID     #Bright red blood per rectum - resolved    #Prophylactic Measure  -DVT prophylaxis: sub therapeutic on Coumadin. Coumadin tonight.

## 2020-12-12 NOTE — CONSULT NOTE ADULT - SUBJECTIVE AND OBJECTIVE BOX
Patient seen and examined.  Full consult dictated and will be available shortly.    Middle aged male with multiple medical problems, s/p AVR and MVR now in acute rehab.  He has a history significant for ischemic bowel s/p colectomy.  He is reported to have a self-limited episode of rectal bleeding.  He denies abdominal or rectal pain.  No diarrhea or constipation reported.  He states last colonoscopy many years ago.  Tolerating diet.  Hgb has remained stable.    VSS.  Abdomen soft, nontender to palpation.  BS+    Impression: Anemia.  Rectal bleeding, self-limited.    Plan:  1. Would monitor Hgb/Hct and bowel movements for now.  Likely hemorrhoidal.  2. Continue present diet  3. Outpatient colonoscopy after discharge

## 2020-12-13 LAB
INR BLD: 2.49 RATIO — HIGH (ref 0.88–1.16)
PROTHROM AB SERPL-ACNC: 28.8 SEC — HIGH (ref 10.6–13.6)

## 2020-12-13 PROCEDURE — 99232 SBSQ HOSP IP/OBS MODERATE 35: CPT

## 2020-12-13 RX ORDER — WARFARIN SODIUM 2.5 MG/1
3 TABLET ORAL ONCE
Refills: 0 | Status: COMPLETED | OUTPATIENT
Start: 2020-12-13 | End: 2020-12-13

## 2020-12-13 RX ADMIN — Medication 6 MILLIGRAM(S): at 21:19

## 2020-12-13 RX ADMIN — Medication 500 MILLIGRAM(S): at 11:22

## 2020-12-13 RX ADMIN — Medication 25 MILLIGRAM(S): at 17:21

## 2020-12-13 RX ADMIN — Medication 200 MILLIGRAM(S): at 21:20

## 2020-12-13 RX ADMIN — Medication 975 MILLIGRAM(S): at 21:18

## 2020-12-13 RX ADMIN — LEVETIRACETAM 750 MILLIGRAM(S): 250 TABLET, FILM COATED ORAL at 05:41

## 2020-12-13 RX ADMIN — ATORVASTATIN CALCIUM 40 MILLIGRAM(S): 80 TABLET, FILM COATED ORAL at 21:18

## 2020-12-13 RX ADMIN — PANTOPRAZOLE SODIUM 40 MILLIGRAM(S): 20 TABLET, DELAYED RELEASE ORAL at 05:42

## 2020-12-13 RX ADMIN — WARFARIN SODIUM 3 MILLIGRAM(S): 2.5 TABLET ORAL at 21:18

## 2020-12-13 RX ADMIN — LEVETIRACETAM 750 MILLIGRAM(S): 250 TABLET, FILM COATED ORAL at 17:21

## 2020-12-13 RX ADMIN — NYSTATIN CREAM 1 APPLICATION(S): 100000 CREAM TOPICAL at 05:42

## 2020-12-13 RX ADMIN — AMIODARONE HYDROCHLORIDE 200 MILLIGRAM(S): 400 TABLET ORAL at 05:42

## 2020-12-13 RX ADMIN — Medication 81 MILLIGRAM(S): at 11:22

## 2020-12-13 RX ADMIN — NYSTATIN CREAM 1 APPLICATION(S): 100000 CREAM TOPICAL at 17:22

## 2020-12-13 RX ADMIN — PANTOPRAZOLE SODIUM 40 MILLIGRAM(S): 20 TABLET, DELAYED RELEASE ORAL at 17:21

## 2020-12-13 RX ADMIN — Medication 1 MILLIGRAM(S): at 11:22

## 2020-12-13 RX ADMIN — Medication 1 TABLET(S): at 11:22

## 2020-12-13 RX ADMIN — Medication 25 MILLIGRAM(S): at 05:41

## 2020-12-13 RX ADMIN — Medication 975 MILLIGRAM(S): at 22:00

## 2020-12-13 RX ADMIN — GABAPENTIN 100 MILLIGRAM(S): 400 CAPSULE ORAL at 21:18

## 2020-12-13 NOTE — PROGRESS NOTE ADULT - SUBJECTIVE AND OBJECTIVE BOX
Cc: Gait dysfunction    HPI: Patient with no new medical issues today.  INR therapeutic this AM.  Patient endorses a dry cough over the last few days, but this symptom has since resolved.  Pain controlled, no chest pain, no N/V, no Fevers/Chills. No other new ROS  Has been tolerating rehabilitation program.    acetaminophen   Tablet .. 975 milliGRAM(s) Oral <User Schedule>  acetaminophen   Tablet .. 650 milliGRAM(s) Oral every 6 hours PRN  ALBUTerol    90 MICROgram(s) HFA Inhaler 2 Puff(s) Inhalation every 6 hours PRN  aMIOdarone    Tablet 200 milliGRAM(s) Oral daily  ascorbic acid 500 milliGRAM(s) Oral daily  aspirin enteric coated 81 milliGRAM(s) Oral daily  atorvastatin 40 milliGRAM(s) Oral at bedtime  benzocaine 15 mG/menthol 3.6 mG (Sugar-Free) Lozenge 1 Lozenge Oral two times a day PRN  epoetin yolanda-epbx (RETACRIT) Injectable 38840 Unit(s) IV Push <User Schedule>  folic acid 1 milliGRAM(s) Oral daily  gabapentin 100 milliGRAM(s) Oral at bedtime  guaiFENesin   Syrup  (Sugar-Free) 200 milliGRAM(s) Oral every 6 hours PRN  hydrocodone/homatropine Syrup 5 milliLiter(s) Oral at bedtime  iron sucrose IVPB 100 milliGRAM(s) IV Intermittent every 7 days  levETIRAcetam 750 milliGRAM(s) Oral two times a day  lidocaine   Patch 1 Patch Transdermal daily  melatonin 6 milliGRAM(s) Oral at bedtime  metoprolol tartrate 25 milliGRAM(s) Oral every 12 hours  multivitamin 1 Tablet(s) Oral daily  nystatin Cream 1 Application(s) Topical two times a day  pantoprazole    Tablet 40 milliGRAM(s) Oral two times a day  warfarin 3 milliGRAM(s) Oral once      T(C): 36.8 (12-12-20 @ 08:10), Max: 37.2 (12-11-20 @ 22:05)  HR: 67 (12-12-20 @ 08:10) (67 - 79)  BP: 101/68 (12-12-20 @ 08:10) (101/68 - 149/85)  RR: 16 (12-12-20 @ 08:10) (16 - 16)  SpO2: 96% (12-12-20 @ 08:10) (96% - 97%)    In NAD  HEENT- EOMI  Heart- RRR, S1S2  Lungs- CTA bl.  Abd- + BS, NT  Ext- No calf pain  Neuro- Exam unchanged          Imp: Patient with diagnosis of CAD s/p CABG, AVR and MVR admitted for comprehensive acute rehabilitation.    Plan:  - Continue therapies  - Appreciate GI recs, will monitor H/H and watch for further BRBPR.  - DVT prophylaxis, continue warfarin and trend INR  - Skin- Turn q2h, check skin daily  - Continue current medications; patient medically stable.   - Patient is stable to continue current rehabilitation program.

## 2020-12-14 ENCOUNTER — TRANSCRIPTION ENCOUNTER (OUTPATIENT)
Age: 60
End: 2020-12-14

## 2020-12-14 DIAGNOSIS — K62.5 HEMORRHAGE OF ANUS AND RECTUM: ICD-10-CM

## 2020-12-14 LAB
ANION GAP SERPL CALC-SCNC: 16 MMOL/L — SIGNIFICANT CHANGE UP (ref 5–17)
BUN SERPL-MCNC: 90 MG/DL — HIGH (ref 7–23)
CALCIUM SERPL-MCNC: 8.1 MG/DL — LOW (ref 8.4–10.5)
CHLORIDE SERPL-SCNC: 101 MMOL/L — SIGNIFICANT CHANGE UP (ref 96–108)
CO2 SERPL-SCNC: 23 MMOL/L — SIGNIFICANT CHANGE UP (ref 22–31)
CREAT SERPL-MCNC: 5 MG/DL — HIGH (ref 0.5–1.3)
GLUCOSE SERPL-MCNC: 103 MG/DL — HIGH (ref 70–99)
HCT VFR BLD CALC: 28.1 % — LOW (ref 39–50)
HGB BLD-MCNC: 9 G/DL — LOW (ref 13–17)
INR BLD: 2.58 RATIO — HIGH (ref 0.88–1.16)
MCHC RBC-ENTMCNC: 29.7 PG — SIGNIFICANT CHANGE UP (ref 27–34)
MCHC RBC-ENTMCNC: 32 GM/DL — SIGNIFICANT CHANGE UP (ref 32–36)
MCV RBC AUTO: 92.7 FL — SIGNIFICANT CHANGE UP (ref 80–100)
NRBC # BLD: 0 /100 WBCS — SIGNIFICANT CHANGE UP (ref 0–0)
PHOSPHATE SERPL-MCNC: 5.7 MG/DL — HIGH (ref 2.5–4.5)
PLATELET # BLD AUTO: 292 K/UL — SIGNIFICANT CHANGE UP (ref 150–400)
POTASSIUM SERPL-MCNC: 3.7 MMOL/L — SIGNIFICANT CHANGE UP (ref 3.5–5.3)
POTASSIUM SERPL-SCNC: 3.7 MMOL/L — SIGNIFICANT CHANGE UP (ref 3.5–5.3)
PROTHROM AB SERPL-ACNC: 29.8 SEC — HIGH (ref 10.6–13.6)
RBC # BLD: 3.03 M/UL — LOW (ref 4.2–5.8)
RBC # FLD: 16.1 % — HIGH (ref 10.3–14.5)
SARS-COV-2 RNA SPEC QL NAA+PROBE: SIGNIFICANT CHANGE UP
SODIUM SERPL-SCNC: 140 MMOL/L — SIGNIFICANT CHANGE UP (ref 135–145)
WBC # BLD: 8.09 K/UL — SIGNIFICANT CHANGE UP (ref 3.8–10.5)
WBC # FLD AUTO: 8.09 K/UL — SIGNIFICANT CHANGE UP (ref 3.8–10.5)

## 2020-12-14 PROCEDURE — 99232 SBSQ HOSP IP/OBS MODERATE 35: CPT | Mod: GC

## 2020-12-14 PROCEDURE — 99232 SBSQ HOSP IP/OBS MODERATE 35: CPT

## 2020-12-14 RX ORDER — METOPROLOL TARTRATE 50 MG
1 TABLET ORAL
Qty: 60 | Refills: 0
Start: 2020-12-14 | End: 2021-01-12

## 2020-12-14 RX ORDER — AMIODARONE HYDROCHLORIDE 400 MG/1
1 TABLET ORAL
Qty: 30 | Refills: 0
Start: 2020-12-14 | End: 2021-01-12

## 2020-12-14 RX ORDER — LEVETIRACETAM 250 MG/1
1 TABLET, FILM COATED ORAL
Qty: 60 | Refills: 0
Start: 2020-12-14 | End: 2021-01-12

## 2020-12-14 RX ORDER — IRON SUCROSE 20 MG/ML
5 INJECTION, SOLUTION INTRAVENOUS
Qty: 0 | Refills: 0 | DISCHARGE
Start: 2020-12-14

## 2020-12-14 RX ORDER — PANTOPRAZOLE SODIUM 20 MG/1
1 TABLET, DELAYED RELEASE ORAL
Qty: 0 | Refills: 0 | DISCHARGE
Start: 2020-12-14 | End: 2021-01-12

## 2020-12-14 RX ORDER — METOPROLOL TARTRATE 50 MG
1 TABLET ORAL
Qty: 60 | Refills: 0 | DISCHARGE
Start: 2020-12-14 | End: 2021-01-12

## 2020-12-14 RX ORDER — ACETAMINOPHEN 500 MG
2 TABLET ORAL
Qty: 0 | Refills: 0 | DISCHARGE
Start: 2020-12-14

## 2020-12-14 RX ORDER — FOLIC ACID 0.8 MG
1 TABLET ORAL
Qty: 30 | Refills: 0
Start: 2020-12-14 | End: 2021-01-12

## 2020-12-14 RX ORDER — ASCORBIC ACID 60 MG
1 TABLET,CHEWABLE ORAL
Qty: 15 | Refills: 0
Start: 2020-12-14 | End: 2020-12-28

## 2020-12-14 RX ORDER — WARFARIN SODIUM 2.5 MG/1
1 TABLET ORAL
Qty: 15 | Refills: 0
Start: 2020-12-14 | End: 2020-12-28

## 2020-12-14 RX ORDER — WARFARIN SODIUM 2.5 MG/1
3 TABLET ORAL ONCE
Refills: 0 | Status: COMPLETED | OUTPATIENT
Start: 2020-12-14 | End: 2020-12-14

## 2020-12-14 RX ORDER — PANTOPRAZOLE SODIUM 20 MG/1
1 TABLET, DELAYED RELEASE ORAL
Qty: 60 | Refills: 0
Start: 2020-12-14 | End: 2021-01-12

## 2020-12-14 RX ORDER — GABAPENTIN 400 MG/1
1 CAPSULE ORAL
Qty: 30 | Refills: 0
Start: 2020-12-14 | End: 2021-01-12

## 2020-12-14 RX ORDER — ASPIRIN/CALCIUM CARB/MAGNESIUM 324 MG
1 TABLET ORAL
Qty: 0 | Refills: 0 | DISCHARGE

## 2020-12-14 RX ORDER — LANOLIN ALCOHOL/MO/W.PET/CERES
2 CREAM (GRAM) TOPICAL
Qty: 0 | Refills: 0 | DISCHARGE
Start: 2020-12-14

## 2020-12-14 RX ORDER — ATORVASTATIN CALCIUM 80 MG/1
1 TABLET, FILM COATED ORAL
Qty: 30 | Refills: 0
Start: 2020-12-14 | End: 2021-01-12

## 2020-12-14 RX ORDER — ASPIRIN/CALCIUM CARB/MAGNESIUM 324 MG
1 TABLET ORAL
Qty: 30 | Refills: 0
Start: 2020-12-14 | End: 2021-01-12

## 2020-12-14 RX ADMIN — AMIODARONE HYDROCHLORIDE 200 MILLIGRAM(S): 400 TABLET ORAL at 05:14

## 2020-12-14 RX ADMIN — NYSTATIN CREAM 1 APPLICATION(S): 100000 CREAM TOPICAL at 18:28

## 2020-12-14 RX ADMIN — ERYTHROPOIETIN 10000 UNIT(S): 10000 INJECTION, SOLUTION INTRAVENOUS; SUBCUTANEOUS at 17:15

## 2020-12-14 RX ADMIN — NYSTATIN CREAM 1 APPLICATION(S): 100000 CREAM TOPICAL at 05:14

## 2020-12-14 RX ADMIN — Medication 1 TABLET(S): at 12:17

## 2020-12-14 RX ADMIN — Medication 500 MILLIGRAM(S): at 12:17

## 2020-12-14 RX ADMIN — BENZOCAINE AND MENTHOL 1 LOZENGE: 5; 1 LIQUID ORAL at 21:53

## 2020-12-14 RX ADMIN — ATORVASTATIN CALCIUM 40 MILLIGRAM(S): 80 TABLET, FILM COATED ORAL at 21:53

## 2020-12-14 RX ADMIN — GABAPENTIN 100 MILLIGRAM(S): 400 CAPSULE ORAL at 21:53

## 2020-12-14 RX ADMIN — LIDOCAINE 1 PATCH: 4 CREAM TOPICAL at 18:38

## 2020-12-14 RX ADMIN — Medication 6 MILLIGRAM(S): at 21:53

## 2020-12-14 RX ADMIN — WARFARIN SODIUM 3 MILLIGRAM(S): 2.5 TABLET ORAL at 21:53

## 2020-12-14 RX ADMIN — IRON SUCROSE 210 MILLIGRAM(S): 20 INJECTION, SOLUTION INTRAVENOUS at 17:15

## 2020-12-14 RX ADMIN — PANTOPRAZOLE SODIUM 40 MILLIGRAM(S): 20 TABLET, DELAYED RELEASE ORAL at 05:14

## 2020-12-14 RX ADMIN — Medication 25 MILLIGRAM(S): at 05:14

## 2020-12-14 RX ADMIN — PANTOPRAZOLE SODIUM 40 MILLIGRAM(S): 20 TABLET, DELAYED RELEASE ORAL at 18:24

## 2020-12-14 RX ADMIN — Medication 200 MILLIGRAM(S): at 21:53

## 2020-12-14 RX ADMIN — LEVETIRACETAM 750 MILLIGRAM(S): 250 TABLET, FILM COATED ORAL at 18:24

## 2020-12-14 RX ADMIN — Medication 81 MILLIGRAM(S): at 12:17

## 2020-12-14 RX ADMIN — LEVETIRACETAM 750 MILLIGRAM(S): 250 TABLET, FILM COATED ORAL at 05:14

## 2020-12-14 RX ADMIN — LIDOCAINE 1 PATCH: 4 CREAM TOPICAL at 12:16

## 2020-12-14 RX ADMIN — Medication 25 MILLIGRAM(S): at 18:24

## 2020-12-14 RX ADMIN — Medication 1 MILLIGRAM(S): at 12:17

## 2020-12-14 NOTE — PROGRESS NOTE ADULT - SUBJECTIVE AND OBJECTIVE BOX
Patient is a 60y old  Male who presents with a chief complaint of Impairments: AVR  Impairment Codes: 09 Cardiac (14 Dec 2020 12:12)      HPI:  JONATHAN GIBSON is a 61yo M with PMHx of CAD s/p PCI to LAD, aortic regurgitation, HTN, thoracic aortic dissection s/p emergent surgery c/b CVA with residual left-sided weakness, colostomy for bowel ischemia s/p reversal, and psoriasis on Humira, who presented to [facility] on 2020 with worsening SOB with associated chest pressure and LE edema. In the ED, had elevated pro-BNP >33k, SCr, acute anemia, and HTN emergency. Evaluated on admission by cardiology; symptoms improved following IV Lasix. Echocardiogram on admission showing EF 50-55%, enlarged LA, mod-sever MR, mod TR, mod-severe AR, and enlarged aortic root.     Patient evaluated by CT surgery for potential AVR/MVR; required CVVHD medical optimization. GI consulted for acute anemia; patient underwent EGD on  with no significant findings. Patient underwent sternotomy for CABG x2, AVR, and MVR on ; post-op c/b acute blood loss anemia requiring multiple blood products, pressors, and inotropes. Patient was extubated on  and underwent permacath placement with IR on . Pressors and inotropes weaned off and transferred to floors on . Repeat TTE with doppler showing EF 40% with mod LV systolic dysfunction.    Patient was evaluated by PM&R and therapy for functional deficits and gait/ ADL impairments and recommended acute rehabilitation. Patient was medically optimized for discharge to [facility] on 2020.    Patient was seen and examined at the bedside on arrival. Denied HA, fever, chills, coughing, wheezing, SOB, or abdominal pain. Last BM was this morning. Appetite has returned, requesting dinner. Endorses some weakness and numbness on the left LE 2/2 residual deficits from prior stroke. Had some low back pain prior to arrival, but now resolved.  (2020 15:16)    SUBJECTIVE/INTERVAL EVENTS  Patient seen and assessed at bedside. No acute events overnight. Midline was removed yesterday. Most of staples removed from left leg today. Patient slept well and feeling well. Denies pain, headaches.      PAST MEDICAL & SURGICAL HISTORY:  CVA (cerebral vascular accident)    CHF (congestive heart failure)    H/O colectomy    Aorta disorder        Allergies    penicillin (Unknown)    Intolerances          VITALS  60y  Vital Signs Last 24 Hrs  T(C): 36.6 (14 Dec 2020 07:45), Max: 37 (13 Dec 2020 21:32)  T(F): 97.8 (14 Dec 2020 07:45), Max: 98.6 (13 Dec 2020 21:32)  HR: 66 (14 Dec 2020 07:45) (66 - 80)  BP: 104/64 (14 Dec 2020 07:45) (104/64 - 153/75)  BP(mean): --  RR: 16 (14 Dec 2020 07:45) (16 - 16)  SpO2: 98% (14 Dec 2020 07:45) (98% - 98%)  Daily     Daily Weight in k.8 (14 Dec 2020 07:25)      Constitutional - NAD, Comfortable  Chest - CTAB  Cardiovascular - RRR  Abdomen - BS+, Soft, NTND  Extremities - LE edema much improved   Motor - left weakness from prior stroke   Psychiatric - Mood stable, Affect WNL  Skin: Sternal incision from sternotomy, c/d/i. Left LE incision from vein harvesting, staples in place; appears c/d/i. Right inner thigh incision, as well with staples in place; appears c/d/i.   Lines: Right IJ Permacath on the right chest wall.      RECENT LABS:  PT/INR - ( 14 Dec 2020 06:44 )   PT: 29.8 sec;   INR: 2.58 ratio        MEDICATIONS  (STANDING):  acetaminophen   Tablet .. 975 milliGRAM(s) Oral <User Schedule>  aMIOdarone    Tablet 200 milliGRAM(s) Oral daily  ascorbic acid 500 milliGRAM(s) Oral daily  aspirin enteric coated 81 milliGRAM(s) Oral daily  atorvastatin 40 milliGRAM(s) Oral at bedtime  epoetin yolanda-epbx (RETACRIT) Injectable 68173 Unit(s) IV Push <User Schedule>  folic acid 1 milliGRAM(s) Oral daily  gabapentin 100 milliGRAM(s) Oral at bedtime  iron sucrose IVPB 100 milliGRAM(s) IV Intermittent every 7 days  levETIRAcetam 750 milliGRAM(s) Oral two times a day  lidocaine   Patch 1 Patch Transdermal daily  melatonin 6 milliGRAM(s) Oral at bedtime  metoprolol tartrate 25 milliGRAM(s) Oral every 12 hours  multivitamin 1 Tablet(s) Oral daily  nystatin Cream 1 Application(s) Topical two times a day  pantoprazole    Tablet 40 milliGRAM(s) Oral two times a day    MEDICATIONS  (PRN):  acetaminophen   Tablet .. 650 milliGRAM(s) Oral every 6 hours PRN Temp greater or equal to 38C (100.4F), Mild Pain (1 - 3)  ALBUTerol    90 MICROgram(s) HFA Inhaler 2 Puff(s) Inhalation every 6 hours PRN Shortness of Breath and/or Wheezing  benzocaine 15 mG/menthol 3.6 mG (Sugar-Free) Lozenge 1 Lozenge Oral two times a day PRN Sore Throat  guaiFENesin   Syrup  (Sugar-Free) 200 milliGRAM(s) Oral every 6 hours PRN Cough

## 2020-12-14 NOTE — DISCHARGE NOTE PROVIDER - NSDCMRMEDTOKEN_GEN_ALL_CORE_FT
acetaminophen 325 mg oral tablet: 2 tab(s) orally every 6 hours, As needed, Mild Pain (1 - 3)  amiodarone 200 mg oral tablet: 1 tab(s) orally once a day for 30 days  ascorbic acid 500 mg oral tablet: 1 tab(s) orally once a day  aspirin 81 mg oral delayed release tablet: 1 tab(s) orally once a day  atorvastatin 40 mg oral tablet: 1 tab(s) orally once a day (at bedtime)  epoetin yolanda: as per HD  folic acid 1 mg oral tablet: 1 tab(s) orally once a day  gabapentin 100 mg oral capsule: 1 cap(s) orally once a day (at bedtime)  levETIRAcetam 750 mg oral tablet: 1 tab(s) orally 2 times a day  lidocaine 5% topical film: Apply topically to affected area once a day, 12 hr on, 12hr off  melatonin 5 mg oral tablet: 1 tab(s) orally once a day (at bedtime)  menthol-benzocaine 3.6 mg-15 mg mucous membrane lozenge:  mucous membrane   methylphenidate 5 mg oral tablet: 1 tab(s) orally   metoprolol tartrate 50 mg oral tablet: 1 tab(s) orally every 8 hours  Multiple Vitamins oral tablet: 1 tab(s) orally once a day  pantoprazole 40 mg oral delayed release tablet: 1 tab(s) orally 2 times a day  QUEtiapine 25 mg oral tablet: 1 tab(s) orally once a day (at bedtime)   acetaminophen 325 mg oral tablet: 2 tab(s) orally every 6 hours, As needed, Temp greater or equal to 38C (100.4F), Mild Pain (1 - 3)  amiodarone 200 mg oral tablet: 1 tab(s) orally once a day  ascorbic acid 500 mg oral tablet: 1 tab(s) orally once a day  aspirin 81 mg oral delayed release tablet: 1 tab(s) orally once a day  atorvastatin 40 mg oral tablet: 1 tab(s) orally once a day (at bedtime)  epoetin yolanda: as per HD  folic acid 1 mg oral tablet: 1 tab(s) orally once a day  gabapentin 100 mg oral capsule: 1 cap(s) orally once a day (at bedtime)  iron sucrose 20 mg/mL intravenous solution: 5 milliliter(s) intravenous every 7 days  levETIRAcetam 750 mg oral tablet: 1 tab(s) orally 2 times a day  melatonin 3 mg oral tablet: 2 tab(s) orally once a day (at bedtime)  metoprolol tartrate 25 mg oral tablet: 1 tab(s) orally every 12 hours  Multiple Vitamins oral tablet: 1 tab(s) orally once a day  pantoprazole 40 mg oral delayed release tablet: 1 tab(s) orally 2 times a day  warfarin 3 mg oral tablet: 1 tab(s) orally once a day    acetaminophen 325 mg oral tablet: 2 tab(s) orally every 6 hours, As needed, Temp greater or equal to 38C (100.4F), Mild Pain (1 - 3)  amiodarone 200 mg oral tablet: 1 tab(s) orally once a day  ascorbic acid 500 mg oral tablet: 1 tab(s) orally once a day  aspirin 81 mg oral delayed release tablet: 1 tab(s) orally once a day  atorvastatin 40 mg oral tablet: 1 tab(s) orally once a day (at bedtime)  Coumadin 1 mg oral tablet: 1 tab(s) orally once a day (at bedtime)   epoetin yolanda: as per HD  folic acid 1 mg oral tablet: 1 tab(s) orally once a day  gabapentin 100 mg oral capsule: 1 cap(s) orally once a day (at bedtime)  iron sucrose 20 mg/mL intravenous solution: 5 milliliter(s) intravenous every 7 days  levETIRAcetam 750 mg oral tablet: 1 tab(s) orally 2 times a day  melatonin 3 mg oral tablet: 2 tab(s) orally once a day (at bedtime)  metoprolol tartrate 25 mg oral tablet: 1 tab(s) orally every 12 hours  Multiple Vitamins oral tablet: 1 tab(s) orally once a day  pantoprazole 40 mg oral delayed release tablet: 1 tab(s) orally 2 times a day

## 2020-12-14 NOTE — PROGRESS NOTE ADULT - SUBJECTIVE AND OBJECTIVE BOX
Seen on HD    Vital Signs Last 24 Hrs  T(C): 36.9 (12-14-20 @ 17:35), Max: 37 (12-13-20 @ 21:32)  T(F): 98.4 (12-14-20 @ 17:35), Max: 98.6 (12-13-20 @ 21:32)  HR: 97 (12-14-20 @ 18:36) (66 - 97)  BP: 133/80 (12-14-20 @ 18:36) (104/64 - 153/75)  RR: 16 (12-14-20 @ 18:36) (16 - 16)  SpO2: 97% (12-14-20 @ 18:36) (97% - 98%)    s1s2  b/l air entry  soft, NT, ND  + edema                                                                                     9.0    8.09  )-----------( 292      ( 14 Dec 2020 14:55 )             28.1     14 Dec 2020 14:55    140    |  101    |  90     ----------------------------<  103    3.7     |  23     |  5.00     Ca    8.1        14 Dec 2020 14:55  Phos  5.7       14 Dec 2020 14:55    PT/INR - ( 14 Dec 2020 06:44 )   PT: 29.8 sec;   INR: 2.58 ratio      acetaminophen   Tablet .. 650 milliGRAM(s) Oral every 6 hours PRN  ALBUTerol    90 MICROgram(s) HFA Inhaler 2 Puff(s) Inhalation every 6 hours PRN  aMIOdarone    Tablet 200 milliGRAM(s) Oral daily  ascorbic acid 500 milliGRAM(s) Oral daily  aspirin enteric coated 81 milliGRAM(s) Oral daily  atorvastatin 40 milliGRAM(s) Oral at bedtime  benzocaine 15 mG/menthol 3.6 mG (Sugar-Free) Lozenge 1 Lozenge Oral two times a day PRN  epoetin yolanda-epbx (RETACRIT) Injectable 15541 Unit(s) IV Push <User Schedule>  folic acid 1 milliGRAM(s) Oral daily  gabapentin 100 milliGRAM(s) Oral at bedtime  guaiFENesin   Syrup  (Sugar-Free) 200 milliGRAM(s) Oral every 6 hours PRN  iron sucrose IVPB 100 milliGRAM(s) IV Intermittent every 7 days  levETIRAcetam 750 milliGRAM(s) Oral two times a day  melatonin 6 milliGRAM(s) Oral at bedtime  metoprolol tartrate 25 milliGRAM(s) Oral every 12 hours  multivitamin 1 Tablet(s) Oral daily  nystatin Cream 1 Application(s) Topical two times a day  pantoprazole    Tablet 40 milliGRAM(s) Oral two times a day  warfarin 3 milliGRAM(s) Oral once    A/P:    S/p CABG x 2, AVR, MVR - 11/13/20  Complicated hospital course as per HPI  Now on HD MWF  TMP as able w/HD  Epogen w/HD 3 x week  Rehab    [phone]

## 2020-12-14 NOTE — DISCHARGE NOTE PROVIDER - PROVIDER TOKENS
PROVIDER:[TOKEN:[[id number]]],PROVIDER:[TOKEN:[[id number]]],PROVIDER:[TOKEN:[[id number]]],PROVIDER:[TOKEN:[[id number]]] PROVIDER:[TOKEN:[[id number]],FOLLOWUP:[1-3 days]],PROVIDER:[TOKEN:[[id number]],FOLLOWUP:[1 month]],PROVIDER:[TOKEN:[[id number]],FOLLOWUP:[1 week]],PROVIDER:[TOKEN:[[id number]],FOLLOWUP:[1 week]] PROVIDER:[TOKEN:[[id number]],FOLLOWUP:[1-3 days]],PROVIDER:[TOKEN:[[id number]],FOLLOWUP:[1 month]],PROVIDER:[TOKEN:[[id number]],FOLLOWUP:[1 week]],PROVIDER:[TOKEN:[[id number]],FOLLOWUP:[1 week]],PROVIDER:[TOKEN:[[id number]],FOLLOWUP:[1-3 days]]

## 2020-12-14 NOTE — PROGRESS NOTE ADULT - SUBJECTIVE AND OBJECTIVE BOX
INTERVAL HPI/OVERNIGHT EVENTS:  HPI:  JONATHAN GIBSON is a 59yo M with PMHx of CAD s/p PCI to LAD, aortic regurgitation, HTN, thoracic aortic dissection s/p emergent surgery c/b CVA with residual left-sided weakness, colostomy for bowel ischemia s/p reversal, and psoriasis on Humira, who presented to [facility] on 2020 with worsening SOB with associated chest pressure and LE edema. In the ED, had elevated pro-BNP >33k, SCr, acute anemia, and HTN emergency. Evaluated on admission by cardiology; symptoms improved following IV Lasix. Echocardiogram on admission showing EF 50-55%, enlarged LA, mod-sever MR, mod TR, mod-severe AR, and enlarged aortic root.     Patient evaluated by CT surgery for potential AVR/MVR; required CVVHD medical optimization. GI consulted for acute anemia; patient underwent EGD on  with no significant findings. Patient underwent sternotomy for CABG x2, AVR, and MVR on ; post-op c/b acute blood loss anemia requiring multiple blood products, pressors, and inotropes. Patient was extubated on  and underwent permacath placement with IR on . Pressors and inotropes weaned off and transferred to floors on . Repeat TTE with doppler showing EF 40% with mod LV systolic dysfunction.    Patient was evaluated by PM&R and therapy for functional deficits and gait/ ADL impairments and recommended acute rehabilitation. Patient was medically optimized for discharge to [facility] on 2020.    Patient was seen and examined at the bedside on arrival. Denied HA, fever, chills, coughing, wheezing, SOB, or abdominal pain. Last BM was this morning. Appetite has returned, requesting dinner. Endorses some weakness and numbness on the left LE 2/2 residual deficits from prior stroke. Had some low back pain prior to arrival, but now resolved.  (2020 15:16)    MEDICATIONS  (STANDING):  aMIOdarone    Tablet 200 milliGRAM(s) Oral daily  ascorbic acid 500 milliGRAM(s) Oral daily  aspirin enteric coated 81 milliGRAM(s) Oral daily  atorvastatin 40 milliGRAM(s) Oral at bedtime  epoetin yolanda-epbx (RETACRIT) Injectable 33812 Unit(s) IV Push <User Schedule>  folic acid 1 milliGRAM(s) Oral daily  gabapentin 100 milliGRAM(s) Oral at bedtime  iron sucrose IVPB 100 milliGRAM(s) IV Intermittent every 7 days  levETIRAcetam 750 milliGRAM(s) Oral two times a day  melatonin 6 milliGRAM(s) Oral at bedtime  metoprolol tartrate 25 milliGRAM(s) Oral every 12 hours  multivitamin 1 Tablet(s) Oral daily  nystatin Cream 1 Application(s) Topical two times a day  pantoprazole    Tablet 40 milliGRAM(s) Oral two times a day  warfarin 3 milliGRAM(s) Oral once    MEDICATIONS  (PRN):  acetaminophen   Tablet .. 650 milliGRAM(s) Oral every 6 hours PRN Temp greater or equal to 38C (100.4F), Mild Pain (1 - 3)  ALBUTerol    90 MICROgram(s) HFA Inhaler 2 Puff(s) Inhalation every 6 hours PRN Shortness of Breath and/or Wheezing  benzocaine 15 mG/menthol 3.6 mG (Sugar-Free) Lozenge 1 Lozenge Oral two times a day PRN Sore Throat  guaiFENesin   Syrup  (Sugar-Free) 200 milliGRAM(s) Oral every 6 hours PRN Cough      Allergies    penicillin (Unknown)    Intolerances        PAST MEDICAL & SURGICAL HISTORY:  CVA (cerebral vascular accident)    CHF (congestive heart failure)    H/O colectomy    Aorta disorder  	    PHYSICAL EXAM:   Vital Signs:  Vital Signs Last 24 Hrs  T(C): 36.6 (14 Dec 2020 07:45), Max: 37 (13 Dec 2020 21:32)  T(F): 97.8 (14 Dec 2020 07:45), Max: 98.6 (13 Dec 2020 21:32)  HR: 66 (14 Dec 2020 07:45) (66 - 80)  BP: 104/64 (14 Dec 2020 07:45) (104/64 - 153/75)  BP(mean): --  RR: 16 (14 Dec 2020 07:45) (16 - 16)  SpO2: 98% (14 Dec 2020 07:45) (98% - 98%)  Daily     Daily Weight in k.8 (14 Dec 2020 07:25)I&O's Summary      GENERAL:  Appears stated age,  HEENT:  NC/AT,  conjunctivae clear and pink,  CHEST:  Full & symmetric excursion, no increased effort, breath sounds clear  HEART:  Regular rhythm, S1, S2, no murmur  ABDOMEN:  Soft, non-tender, non-distended, normoactive bowel sounds,    EXTREMITIES  no edema  SKIN:  No rash/warm/dry  NEURO:  Alert, oriented    LABS:          PT/INR - ( 14 Dec 2020 06:44 )   PT: 29.8 sec;   INR: 2.58 ratio             amylase   lipase  RADIOLOGY & ADDITIONAL TESTS:   INTERVAL HPI/OVERNIGHT EVENTS:  HPI:  61 y/o male admitted to rehab s/p   JONATHAN GIBSON is a 61yo M with PMHx of CAD s/p PCI to LAD, aortic regurgitation, HTN, thoracic aortic dissection s/p emergent surgery c/b CVA with residual left-sided weakness, colostomy for bowel ischemia s/p reversal, and psoriasis on Humira, who presented to [facility] on 2020 with worsening SOB with associated chest pressure and LE edema. In the ED, had elevated pro-BNP >33k, SCr, acute anemia, and HTN emergency. Evaluated on admission by cardiology; symptoms improved following IV Lasix. Echocardiogram on admission showing EF 50-55%, enlarged LA, mod-sever MR, mod TR, mod-severe AR, and enlarged aortic root.     Patient evaluated by CT surgery for potential AVR/MVR; required CVVHD medical optimization. GI consulted for acute anemia; patient underwent EGD on  with no significant findings. Patient underwent sternotomy for CABG x2, AVR, and MVR on ; post-op c/b acute blood loss anemia requiring multiple blood products, pressors, and inotropes. Patient was extubated on  and underwent permacath placement with IR on . Pressors and inotropes weaned off and transferred to floors on . Repeat TTE with doppler showing EF 40% with mod LV systolic dysfunction.    Patient was evaluated by PM&R and therapy for functional deficits and gait/ ADL impairments and recommended acute rehabilitation. Patient was medically optimized for discharge to [facility] on 2020.    Patient was seen and examined at the bedside on arrival. Denied HA, fever, chills, coughing, wheezing, SOB, or abdominal pain. Last BM was this morning. Appetite has returned, requesting dinner. Endorses some weakness and numbness on the left LE 2/2 residual deficits from prior stroke. Had some low back pain prior to arrival, but now resolved.  (2020 15:16)    MEDICATIONS  (STANDING):  aMIOdarone    Tablet 200 milliGRAM(s) Oral daily  ascorbic acid 500 milliGRAM(s) Oral daily  aspirin enteric coated 81 milliGRAM(s) Oral daily  atorvastatin 40 milliGRAM(s) Oral at bedtime  epoetin yolanda-epbx (RETACRIT) Injectable 90324 Unit(s) IV Push <User Schedule>  folic acid 1 milliGRAM(s) Oral daily  gabapentin 100 milliGRAM(s) Oral at bedtime  iron sucrose IVPB 100 milliGRAM(s) IV Intermittent every 7 days  levETIRAcetam 750 milliGRAM(s) Oral two times a day  melatonin 6 milliGRAM(s) Oral at bedtime  metoprolol tartrate 25 milliGRAM(s) Oral every 12 hours  multivitamin 1 Tablet(s) Oral daily  nystatin Cream 1 Application(s) Topical two times a day  pantoprazole    Tablet 40 milliGRAM(s) Oral two times a day  warfarin 3 milliGRAM(s) Oral once    MEDICATIONS  (PRN):  acetaminophen   Tablet .. 650 milliGRAM(s) Oral every 6 hours PRN Temp greater or equal to 38C (100.4F), Mild Pain (1 - 3)  ALBUTerol    90 MICROgram(s) HFA Inhaler 2 Puff(s) Inhalation every 6 hours PRN Shortness of Breath and/or Wheezing  benzocaine 15 mG/menthol 3.6 mG (Sugar-Free) Lozenge 1 Lozenge Oral two times a day PRN Sore Throat  guaiFENesin   Syrup  (Sugar-Free) 200 milliGRAM(s) Oral every 6 hours PRN Cough      Allergies    penicillin (Unknown)    Intolerances        PAST MEDICAL & SURGICAL HISTORY:  CVA (cerebral vascular accident)    CHF (congestive heart failure)    H/O colectomy    Aorta disorder  	    PHYSICAL EXAM:   Vital Signs:  Vital Signs Last 24 Hrs  T(C): 36.6 (14 Dec 2020 07:45), Max: 37 (13 Dec 2020 21:32)  T(F): 97.8 (14 Dec 2020 07:45), Max: 98.6 (13 Dec 2020 21:32)  HR: 66 (14 Dec 2020 07:45) (66 - 80)  BP: 104/64 (14 Dec 2020 07:45) (104/64 - 153/75)  BP(mean): --  RR: 16 (14 Dec 2020 07:45) (16 - 16)  SpO2: 98% (14 Dec 2020 07:45) (98% - 98%)  Daily     Daily Weight in k.8 (14 Dec 2020 07:25)I&O's Summary      GENERAL:  Appears stated age,  HEENT:  NC/AT,  conjunctivae clear and pink,  CHEST:  Full & symmetric excursion, no increased effort, breath sounds clear  HEART:  Regular rhythm, S1, S2, no murmur  ABDOMEN:  Soft, non-tender, non-distended, normoactive bowel sounds,    EXTREMITIES  no edema  SKIN:  No rash/warm/dry  NEURO:  Alert, oriented    LABS:          PT/INR - ( 14 Dec 2020 06:44 )   PT: 29.8 sec;   INR: 2.58 ratio             amylase   lipase  RADIOLOGY & ADDITIONAL TESTS:   INTERVAL HPI/OVERNIGHT EVENTS:  HPI:    59 y/o male admitted to rehab s/p recent hospitalization for HTN emergency. GI following patient due to episode of BRBPR. Patient seen and examined at bedside. He states he has had no further episodes. Denies abdominal pain.     MEDICATIONS  (STANDING):  aMIOdarone    Tablet 200 milliGRAM(s) Oral daily  ascorbic acid 500 milliGRAM(s) Oral daily  aspirin enteric coated 81 milliGRAM(s) Oral daily  atorvastatin 40 milliGRAM(s) Oral at bedtime  epoetin yolanda-epbx (RETACRIT) Injectable 90976 Unit(s) IV Push <User Schedule>  folic acid 1 milliGRAM(s) Oral daily  gabapentin 100 milliGRAM(s) Oral at bedtime  iron sucrose IVPB 100 milliGRAM(s) IV Intermittent every 7 days  levETIRAcetam 750 milliGRAM(s) Oral two times a day  melatonin 6 milliGRAM(s) Oral at bedtime  metoprolol tartrate 25 milliGRAM(s) Oral every 12 hours  multivitamin 1 Tablet(s) Oral daily  nystatin Cream 1 Application(s) Topical two times a day  pantoprazole    Tablet 40 milliGRAM(s) Oral two times a day  warfarin 3 milliGRAM(s) Oral once    MEDICATIONS  (PRN):  acetaminophen   Tablet .. 650 milliGRAM(s) Oral every 6 hours PRN Temp greater or equal to 38C (100.4F), Mild Pain (1 - 3)  ALBUTerol    90 MICROgram(s) HFA Inhaler 2 Puff(s) Inhalation every 6 hours PRN Shortness of Breath and/or Wheezing  benzocaine 15 mG/menthol 3.6 mG (Sugar-Free) Lozenge 1 Lozenge Oral two times a day PRN Sore Throat  guaiFENesin   Syrup  (Sugar-Free) 200 milliGRAM(s) Oral every 6 hours PRN Cough      Allergies    penicillin (Unknown)    Intolerances        PAST MEDICAL & SURGICAL HISTORY:  CVA (cerebral vascular accident)    CHF (congestive heart failure)    H/O colectomy    Aorta disorder  	    PHYSICAL EXAM:   Vital Signs:  Vital Signs Last 24 Hrs  T(C): 36.6 (14 Dec 2020 07:45), Max: 37 (13 Dec 2020 21:32)  T(F): 97.8 (14 Dec 2020 07:45), Max: 98.6 (13 Dec 2020 21:32)  HR: 66 (14 Dec 2020 07:45) (66 - 80)  BP: 104/64 (14 Dec 2020 07:45) (104/64 - 153/75)  BP(mean): --  RR: 16 (14 Dec 2020 07:45) (16 - 16)  SpO2: 98% (14 Dec 2020 07:45) (98% - 98%)  Daily     Daily Weight in k.8 (14 Dec 2020 07:25)I&O's Summary      GENERAL:  Appears stated age,  HEENT:  NC/AT,  conjunctivae clear and pink,  CHEST:  Full & symmetric excursion, no increased effort, breath sounds clear  HEART:  Regular rhythm, S1, S2, no murmur  ABDOMEN:  Soft, non-tender, non-distended, normoactive bowel sounds,    EXTREMITIES  no edema  SKIN:  No rash/warm/dry  NEURO:  Alert, oriented    LABS:          PT/INR - ( 14 Dec 2020 06:44 )   PT: 29.8 sec;   INR: 2.58 ratio             amylase   lipase  RADIOLOGY & ADDITIONAL TESTS:

## 2020-12-14 NOTE — DISCHARGE NOTE NURSING/CASE MANAGEMENT/SOCIAL WORK - NSDCFUADDAPPT_GEN_ALL_CORE_FT
Please follow up with PCP Dr. Quinn Yo in 2 weeks. Please follow up with PCP Dr. Quinn Yo in 2 weeks.     Dialysis at [facility]  Address:  [address], [place] [address]  Date:12/17/2020  Time: 5AM  *Please arrive 15 minutes early, bring 2 forms of ID and insurance cards    Please follow up with PCP Dr. Quinn Yo in 2 weeks.     Dialysis at [facility]  Address:  [address] [place] [id number]  Date:12/17/2020  Time: 5AM  Phone: [phone]  *Please arrive 15 minutes early, bring 2 forms of ID and insurance cards . Bring pillow and blanket for your own comfort.

## 2020-12-14 NOTE — DISCHARGE NOTE PROVIDER - HOSPITAL COURSE
JONATHAN GIBSON is a 61yo M with PMHx of CAD s/p PCI to LAD, aortic regurgitation, HTN, thoracic aortic dissection s/p emergent surgery c/b CVA with residual left-sided weakness, colostomy for bowel ischemia s/p reversal, and psoriasis on Humira, who presented to [facility] on 11/02/2020 with worsening SOB with associated chest pressure and LE edema. In the ED, had elevated pro-BNP >33k, SCr, acute anemia, and HTN emergency. Evaluated on admission by cardiology; symptoms improved following IV Lasix. Echocardiogram on admission showing EF 50-55%, enlarged LA, mod-sever MR, mod TR, mod-severe AR, and enlarged aortic root.     Patient evaluated by CT surgery for potential AVR/MVR; required CVVHD medical optimization. GI consulted for acute anemia; patient underwent EGD on 11/09 with no significant findings. Patient underwent sternotomy for CABG x2, AVR, and MVR on 11/13; post-op c/b acute blood loss anemia requiring multiple blood products, pressors, and inotropes. Patient was extubated on 11/16 and underwent permacath placement with IR on 11/20. Pressors and inotropes weaned off and transferred to floors on 11/28. Repeat TTE with doppler showing EF 40% with mod LV systolic dysfunction.    Patient was evaluated by PM&R and therapy for functional deficits and gait/ ADL impairments and recommended acute rehabilitation. Patient was medically optimized for discharge to [facility] on 11/30/2020. Patient was stable upon rehab admission to  [facility]. Admitted with gait instabilty, ADL, and functional impairments.     Rehab Course significant for anemia requiring 1 unit PRBC transfusion with HD on 12/4 and an episode of BRBPR on 12/9 that spontaneously resolved. H/H has been stable. He continued to receive HD on MWF schedule. All other medical co-morbidities were stable.     Patient tolerated course of inpatient PT/OT/SLP rehab with significant functional improvements and met rehab goals prior to discharge. Patient was medically cleared on ___  for discharged to home.      Patient will follow up with the following:   Dr. Javy Butler (CT surgery)  Dr. Laurent Morales (Nephrology) JONATHAN GIBSON is a 59yo M with PMHx of CAD s/p PCI to LAD, aortic regurgitation, HTN, thoracic aortic dissection s/p emergent surgery c/b CVA with residual left-sided weakness, colostomy for bowel ischemia s/p reversal, and psoriasis on Humira, who presented to [facility] on 11/02/2020 with worsening SOB with associated chest pressure and LE edema. In the ED, had elevated pro-BNP >33k, SCr, acute anemia, and HTN emergency. Evaluated on admission by cardiology; symptoms improved following IV Lasix. Echocardiogram on admission showing EF 50-55%, enlarged LA, mod-sever MR, mod TR, mod-severe AR, and enlarged aortic root.     Patient evaluated by CT surgery for potential AVR/MVR; required CVVHD medical optimization. GI consulted for acute anemia; patient underwent EGD on 11/09 with no significant findings. Patient underwent sternotomy for CABG x2, AVR, and MVR on 11/13; post-op c/b acute blood loss anemia requiring multiple blood products, pressors, and inotropes. Patient was extubated on 11/16 and underwent permacath placement with IR on 11/20. Pressors and inotropes weaned off and transferred to floors on 11/28. Repeat TTE with doppler showing EF 40% with mod LV systolic dysfunction.    Patient was evaluated by PM&R and therapy for functional deficits and gait/ ADL impairments and recommended acute rehabilitation. Patient was medically optimized for discharge to [facility] on 11/30/2020. Patient was stable upon rehab admission to  [facility]patient [facility] Facility. Admitted with gait instabilty, ADL, and functional impairments.     Rehab Course significant for anemia requiring 1 unit PRBC transfusion with HD on 12/4 and an episode of BRBPR on 12/9 that spontaneously resolved. H/H has been stable. GI consulted and recommended outpatient colonoscopy. He continued to receive HD on MWF schedule. All other medical co-morbidities were stable. Staples on left leg removed 12/14/20. Patient has been stable on Coumadin 3mg QHS.    Patient tolerated course of inpatient PT/OT/SLP rehab with significant functional improvements and met rehab goals prior to discharge. Patient was medically cleared on 12/15/20 for discharged to home.      Patient will follow up with the following:   Dr. Javy Butler (CT surgery)  Dr. Laurent Morales (Nephrology) JONATHAN GIBSON is a 61yo M with PMHx of CAD s/p PCI to LAD, aortic regurgitation, HTN, thoracic aortic dissection s/p emergent surgery c/b CVA with residual left-sided weakness, colostomy for bowel ischemia s/p reversal, and psoriasis on Humira, who presented to [facility] on 11/02/2020 with worsening SOB with associated chest pressure and LE edema. In the ED, had elevated pro-BNP >33k, SCr, acute anemia, and HTN emergency. Evaluated on admission by cardiology; symptoms improved following IV Lasix. Echocardiogram on admission showing EF 50-55%, enlarged LA, mod-sever MR, mod TR, mod-severe AR, and enlarged aortic root.     Patient evaluated by CT surgery for potential AVR/MVR; required CVVHD medical optimization. GI consulted for acute anemia; patient underwent EGD on 11/09 with no significant findings. Patient underwent sternotomy for CABG x2, AVR, and MVR on 11/13; post-op c/b acute blood loss anemia requiring multiple blood products, pressors, and inotropes. Patient was extubated on 11/16 and underwent permacath placement with IR on 11/20. Pressors and inotropes weaned off and transferred to floors on 11/28. Repeat TTE with doppler showing EF 40% with mod LV systolic dysfunction.    Patient was evaluated by PM&R and therapy for functional deficits and gait/ ADL impairments and recommended acute rehabilitation. Patient was medically optimized for discharge to [facility] on 11/30/2020. Patient was stable upon rehab admission to  [facility]patient [facility] Facility. Admitted with gait instabilty, ADL, and functional impairments.     Rehab Course significant for anemia requiring 1 unit PRBC transfusion with HD on 12/4 and an episode of BRBPR on 12/9 that spontaneously resolved. H/H has been stable. GI consulted and recommended outpatient colonoscopy. He continued to receive HD on MWF schedule. All other medical co-morbidities were stable. Partial Staples on left leg removed 12/14/20. Patient has been stable on Coumadin 3mg QHS.    Patient tolerated course of inpatient PT/OT/SLP rehab with significant functional improvements and met rehab goals prior to discharge. Patient was medically cleared on 12/15/20 for discharged to home.      Patient will follow up with the following:   Dr. Javy Butler (CT surgery)  Dr. Laurent Morales (Nephrology) JONATHAN GIBSON is a 61yo M with PMHx of CAD s/p PCI to LAD, aortic regurgitation, HTN, thoracic aortic dissection s/p emergent surgery c/b CVA with residual left-sided weakness, colostomy for bowel ischemia s/p reversal, and psoriasis on Humira, who presented to [facility] on 11/02/2020 with worsening SOB with associated chest pressure and LE edema. In the ED, had elevated pro-BNP >33k, SCr, acute anemia, and HTN emergency. Evaluated on admission by cardiology; symptoms improved following IV Lasix. Echocardiogram on admission showing EF 50-55%, enlarged LA, mod-sever MR, mod TR, mod-severe AR, and enlarged aortic root.     Patient evaluated by CT surgery for potential AVR/MVR; required CVVHD medical optimization. GI consulted for acute anemia; patient underwent EGD on 11/09 with no significant findings. Patient underwent sternotomy for CABG x2, AVR, and MVR on 11/13; post-op c/b acute blood loss anemia requiring multiple blood products, pressors, and inotropes. Patient was extubated on 11/16 and underwent permacath placement with IR on 11/20. Pressors and inotropes weaned off and transferred to floors on 11/28. Repeat TTE with doppler showing EF 40% with mod LV systolic dysfunction.    Patient was evaluated by PM&R and therapy for functional deficits and gait/ ADL impairments and recommended acute rehabilitation. Patient was medically optimized for discharge to [facility] on 11/30/2020. Patient was stable upon rehab admission to  Inpatient Rehabilitation Facility. Admitted with gait instabilty, ADL, and functional impairments.     Rehab Course significant for anemia requiring 1 unit PRBC transfusion with HD on 12/4 and an episode of BRBPR on 12/9 that spontaneously resolved. H/H has been stable. GI consulted and recommended outpatient colonoscopy. He continued to receive HD on MWF schedule. All other medical co-morbidities were stable. Partial Staples on left leg removed 12/14/20. Patient has a tendency to have supratherapeutic INR on Coumadin 3mg QHS. Patient discharged with Coumadin 1mg QHS with close monitoring.    Patient tolerated course of inpatient PT/OT/SLP rehab with significant functional improvements and met rehab goals prior to discharge. Patient was medically cleared on 12/15/20 for discharged to home.      Patient will follow up with the following:   Dr. Javy Butler (CT surgery)  Dr. Laurent Morales (Nephrology)

## 2020-12-14 NOTE — DISCHARGE NOTE PROVIDER - NSDCCPCAREPLAN_GEN_ALL_CORE_FT
PRINCIPAL DISCHARGE DIAGNOSIS  Diagnosis: S/P CABG (coronary artery bypass graft)  Assessment and Plan of Treatment:       SECONDARY DISCHARGE DIAGNOSES  Diagnosis: Acute on chronic kidney failure  Assessment and Plan of Treatment:      PRINCIPAL DISCHARGE DIAGNOSIS  Diagnosis: S/P CABG (coronary artery bypass graft)  Assessment and Plan of Treatment: You were admitted to [facility] for rehab following open heart surgery. Please follow up with the cardiac surgeon and your PMD after discharge.      SECONDARY DISCHARGE DIAGNOSES  Diagnosis: Acute on chronic kidney failure  Assessment and Plan of Treatment: Please follow up with your PMD and nephrologist, upon discharge. Please continue to attend your dialysis sessions, as scheduled.     PRINCIPAL DISCHARGE DIAGNOSIS  Diagnosis: S/P CABG (coronary artery bypass graft)  Assessment and Plan of Treatment: You were admitted to [facility] for rehab following open heart surgery. Please follow up with the cardiac surgeon and your PMD after discharge.  You are on a blood thinner called coumadin or warfarin. Your PCP will monitor the levels. This medication interacts with food and medications. Please inform your PCP if you take any over the counter medications  1. Take ALL of your medications as ordered. Fill your prescriptions the day you are discharged and take according to the schedule you were given. Continue to take a stool softener if you are taking narcotic pain medications. AVOID medications such as ibuprofen or naproxen if you have had bypass surgery. If you have any questions or are unable to fill the prescriptions, please call the office right away at [phone].  2. Shower daily. Clean all incisions daily while showering with warm water and mild soap, pat dry with a clean towel and do not cover with any dressings unless instructed to. No bathing, swimming in a pool or the ocean until instructed by MD.  DO NOT use creams or lotions on the wound.  3. We advise that you do not drive until instructed by physician  4. You may not return to work until instructed by MD.   5. Please eat a low fat, low cholesterol, low salt diet. (No added/extra salt)  6. Weigh yourself every day in the morning and record it in the weight log in your red folder. Notify the office of any weight gain more than 2-3 pounds in 24 hours.  7. Continue breathing exercises several times a day. Continue to use your heart pillow.  8. No heavy lifting nothing greater than 5 pounds until cleared by MD.   9. Call / Notify MD any fever greater than 101.0, any drainage from incisions or if they become red, hot or drainage from incisions or if they become red, hot or        SECONDARY DISCHARGE DIAGNOSES  Diagnosis: Acute congestive heart failure  Assessment and Plan of Treatment: You are treated at prior hospital for diagnosis of heart failure. To prevent exacerbation of congestive heart failure (CHF) it is important to follow a heart-healthy, LOW SODIUM diet. You should read all food labels and keep your sodium intake less than 1500 mg per day. Examples of high sodium foods include fast food or processed food (ie frozen TV dinners), chinese food, soup and regular cold cuts. You should also restrict your fluid intake to less than 1-1.5 liters per day. Please weigh yourself every day at the same time in the morning and document your weight in a weight log. Please call your doctor right away if you gain over 2-3 pounds in one day, or you notice an increase in leg swelling, abdominal swelling or shortness of breath. Making sure you take all of your medications as prescribed and maintaining a normal blood pressure are also important for preventing a CHF exacerbation.      Diagnosis: Mitral and aortic regurgitation  Assessment and Plan of Treatment: You will receive a wallet card about your new valve in the mail.  Please carry it with you to present to anyone who may ask if you have any medical implants.  - Be sure to inform your doctors including your dentist about your valve since you will need to take antibiotics to reduce the risk of infection before certain medical and dental procedures.      Diagnosis: Acute on chronic kidney failure  Assessment and Plan of Treatment: Please follow up with your PMD and nephrologist, upon discharge. Please continue to attend your dialysis sessions, as scheduled.     PRINCIPAL DISCHARGE DIAGNOSIS  Diagnosis: S/P CABG (coronary artery bypass graft)  Assessment and Plan of Treatment: You were admitted to [facility] for rehab following open heart surgery. Please follow up with the cardiac surgeon and your PMD after discharge.  You are on a blood thinner called coumadin or warfarin. Your PCP will monitor the levels. This medication interacts with food and medications. Please inform your PCP if you take any over the counter medications. DO NOT take the Coumadin on 12/15/20 due to a supratherapeutic INR level.   1. Take ALL of your medications as ordered. Fill your prescriptions the day you are discharged and take according to the schedule you were given. Continue to take a stool softener if you are taking narcotic pain medications. AVOID medications such as ibuprofen or naproxen if you have had bypass surgery. If you have any questions or are unable to fill the prescriptions, please call the office right away at [phone].  2. Shower daily. Clean all incisions daily while showering with warm water and mild soap, pat dry with a clean towel and do not cover with any dressings unless instructed to. No bathing, swimming in a pool or the ocean until instructed by MD.  DO NOT use creams or lotions on the wound.  3. We advise that you do not drive until instructed by physician  4. You may not return to work until instructed by MD.   5. Please eat a low fat, low cholesterol, low salt diet. (No added/extra salt)  6. Weigh yourself every day in the morning and record it in the weight log in your red folder. Notify the office of any weight gain more than 2-3 pounds in 24 hours.  7. Continue breathing exercises several times a day. Continue to use your heart pillow.  8. No heavy lifting nothing greater than 5 pounds until cleared by MD.   9. Call / Notify MD any fever greater than 101.0, any drainage from incisions or if they become red, hot or drainage from incisions or if they become red, hot or        SECONDARY DISCHARGE DIAGNOSES  Diagnosis: Acute congestive heart failure  Assessment and Plan of Treatment: You are treated at prior hospital for diagnosis of heart failure. To prevent exacerbation of congestive heart failure (CHF) it is important to follow a heart-healthy, LOW SODIUM diet. You should read all food labels and keep your sodium intake less than 1500 mg per day. Examples of high sodium foods include fast food or processed food (ie frozen TV dinners), chinese food, soup and regular cold cuts. You should also restrict your fluid intake to less than 1-1.5 liters per day. Please weigh yourself every day at the same time in the morning and document your weight in a weight log. Please call your doctor right away if you gain over 2-3 pounds in one day, or you notice an increase in leg swelling, abdominal swelling or shortness of breath. Making sure you take all of your medications as prescribed and maintaining a normal blood pressure are also important for preventing a CHF exacerbation.      Diagnosis: Mitral and aortic regurgitation  Assessment and Plan of Treatment: You will receive a wallet card about your new valve in the mail.  Please carry it with you to present to anyone who may ask if you have any medical implants.  - Be sure to inform your doctors including your dentist about your valve since you will need to take antibiotics to reduce the risk of infection before certain medical and dental procedures.      Diagnosis: Acute on chronic kidney failure  Assessment and Plan of Treatment: Please follow up with your PMD and nephrologist, upon discharge. Please continue to attend your dialysis sessions, as scheduled.

## 2020-12-14 NOTE — PROGRESS NOTE ADULT - ATTENDING COMMENTS
Chart reviewed. Patient seen at bedside  Feels well. Cough much improved  Denies any new pain  Skin: sternal and left thigh incisions have healed well. Left LE incision- clean, dry- some staples removed from inferior part of incision. Upper region with scab- clean- d/w CTS team- may keep staples - to remove at fu visit    2. PCP. Dr. Yo, updated by resident. INR to be followed by PCP    3. Continue rehab program.   dc planning home in am with home care

## 2020-12-14 NOTE — DISCHARGE NOTE NURSING/CASE MANAGEMENT/SOCIAL WORK - PATIENT PORTAL LINK FT
You can access the FollowMyHealth Patient Portal offered by [facility] by registering at the following website: [email or web address]. By joining [employer]’s FollowMyHealth portal, you will also be able to view your health information using other applications (apps) compatible with our system.

## 2020-12-14 NOTE — PROGRESS NOTE ADULT - SUBJECTIVE AND OBJECTIVE BOX
Patient is a 60y old  Male who presents with a chief complaint of Impairments: AVR  Impairment Codes: 09 Cardiac (14 Dec 2020 07:03)      Patient seen and examined at bedside.  Patient denies any chest pain or shortness of breath   ALLERGIES:  penicillin (Unknown)    MEDICATIONS:  acetaminophen   Tablet .. 975 milliGRAM(s) Oral <User Schedule>  ALBUTerol    90 MICROgram(s) HFA Inhaler 2 Puff(s) Inhalation every 6 hours PRN  guaiFENesin   Syrup  (Sugar-Free) 200 milliGRAM(s) Oral every 6 hours PRN  lidocaine   Patch 1 Patch Transdermal daily  metoprolol tartrate 25 milliGRAM(s) Oral every 12 hours  nystatin Cream 1 Application(s) Topical two times a day    Vital Signs Last 24 Hrs  T(F): 97.8 (14 Dec 2020 07:45), Max: 98.6 (13 Dec 2020 21:32)  HR: 66 (14 Dec 2020 07:45) (66 - 80)  BP: 104/64 (14 Dec 2020 07:45) (104/64 - 153/75)  RR: 16 (14 Dec 2020 07:45) (16 - 16)  SpO2: 98% (14 Dec 2020 07:45) (98% - 98%)  I&O's Summary      PHYSICAL EXAM:  General: NAD, A/O x 3  ENT: MMM  Neck: Supple, No JVD  Lungs: Clear to auscultation bilaterally no crackles heard   Cardio: IRR, S1/S2,2/6 systolic murmur   Abdomen: Soft, Nontender, Nondistended; Bowel sounds present  Extremities: No cyanosis, No edema    LABS:                        8.5    9.65  )-----------( 260      ( 11 Dec 2020 15:10 )             27.6     12-11    138  |  100  |  72  ----------------------------<  99  3.9   |  26  |  4.25    Ca    8.8      11 Dec 2020 15:10  Phos  5.3     12-11      eGFR if Non African American: 14 mL/min/1.73M2 (12-11-20 @ 15:10)  eGFR if : 17 mL/min/1.73M2 (12-11-20 @ 15:10)    PT/INR - ( 14 Dec 2020 06:44 )   PT: 29.8 sec;   INR: 2.58 ratio                 11-15 Chol -- LDL -- HDL -- Trig 105 mg/dL                        RADIOLOGY & ADDITIONAL TESTS:    Care Discussed with Consultants/Other Providers:

## 2020-12-14 NOTE — PROGRESS NOTE ADULT - ASSESSMENT
61yo M with PMHx of CAD s/p PCI to LAD, aortic regurgitation, HTN, thoracic aortic dissection s/p emergent surgery c/b CVA with residual left-sided weakness, colostomy for bowel ischemia s/p reversal, and psoriasis on Humira, who presented to [facility] on 11/02/2020 with worsening SOB with associated chest pressure and LE edema; found to have mod-severe MR and AR, now s/p sternotomy for CABG x2, AVR, and MVR. Hospital Course complicated by SANTOS now requiring HD, acute blood loss anemia s/p multiple PRBCs, and atrial flutter. Admitted for multidisciplinary rehab program.      #Comprehensive Multidisciplinary Rehab Program:- PT/OT/ST 3 hours a day 5 days a week    CAD with AR and MR, s/p sternotomy  with CABG x2, AVR, and MVR  - repeat TTE prior to discharge on 11/23 with EF 40%  - Daily INR; c/w Coumadin  - c/w ASA 81mg daily  - c/w atorvastatin 40mg qhs  - c/w Metoprolol tartrate 50mg q8h    #Bloody BM  - Coumadin held on 12/9-10; hgb stable  - prior w/u for anemia included EGD and pouchoscopy on 11/09/2020 at [facility] were unrevealing  - has non-bleeding hemorrhoids. No further episodes; continue to monitor for now  - GI recs outpatient colonoscopy    #Cough  - trial of Hycodan qhs x3 days; improved on Hycodan for now    #Anemia:  - s/p 1 units PRBC on 12/4 with HD  - continue to monitor CBC with HD    Atrial flutter:  - c/w Coumadin and Metoprolol  - c/w Amiodarone 200mg daily  - Coumadin qhs    ATN:  - s/p Permcath by IR on 11/20  - renal consult; HD MWF  - Retacrit with HD    History of seizures:  - c/w Keppra 750mg BID    HLD:  - c/w Atorvastatin    Mood/sleep: sleep improved with trazodone.   NP eval noted     Pain:  - Tylenol PRN   - c/w gabapentin 100mg qhs  - Lidocaine patch daily for right thoracic back pain; massage with therapy    GI/Bowel:  - Senna 2 tabs daily  - GI ppx: Protonix 40mg daily    /Bladder: Toileting schedule prn    #Diet: Regular, DASH/TLC, renal    - Nutrition to follow    Skin/ Pressure Injury Prevention:  - Incisions: sternotomy and left LE vein harvest incisions  - Turn Q2hrs in bed while awake, OOB to Chair, PT/OT    DVT prophylaxis:  - Coumadin    Labs with leucocytosis: Improved    Hospitalist consult noted    Disp: Interdisciplinary Team Rounds, 12/08/2020  - mod assist with bathing; min assist with transfers and stairs. Ambulates 60' with RW; mild cognitive deficit  - TDD 12/15; will need family training prior to discharge

## 2020-12-14 NOTE — PROGRESS NOTE ADULT - PROBLEM SELECTOR PLAN 1
1. Would monitor Hgb/Hct and bowel movements for now.  Likely hemorrhoidal.  2. Continue present diet  3. Outpatient colonoscopy after discharge  4. Will sign off, please reconsult as needed

## 2020-12-14 NOTE — PROGRESS NOTE ADULT - ASSESSMENT
60M with PMH CAD s/p PCI to LAD, aortic regurgitation, HTN, thoracic aortic dissection s/p emergent surgery c/b CVA with residual left-sided weakness, colostomy for bowel ischemia s/p reversal, and psoriasis on Humira, presented to [facility] for acute rehab. Post op complications with acute blood loss anemia requiring multiple blood products.    #CAD with AR and MR   #Combined systolic/diastolic CHF  #Debility  -Continue comprehensive rehab program  -s/p sternotomy  with CABG x2, AVR, and MVR 11/13  -Continue Aspirin, Metoprolol, Lipitor    #Atrial flutter  -Continue Metoprolol, Amiodarone   -Continue AC with Coumadin.  -Goal INR 2-3. Dose Coumadin tonight.   -Patient to get home draws for INR in the future     #ESRD on HD due to ATN  #Anemia  -HD MWF  -Nephrology on board  -Avoid nephrotoxic agents  -Venofer with HD, Retacrit   -Maintain Hb>8    #Seizure Disorder  -No recent seizures  -Continue Keppra BID     #Bright red blood per rectum - resolved    #Prophylactic Measure  -DVT prophylaxis: sub therapeutic on Coumadin. Coumadin tonight.

## 2020-12-14 NOTE — DISCHARGE NOTE NURSING/CASE MANAGEMENT/SOCIAL WORK - NSDCPEPTCAREGIVEDUMATLIST _GEN_ALL_CORE
Warfarin/Coumadin Warfarin/Coumadin/Heart Failure Heart Failure/Warfarin/Coumadin/Influenza Vaccination

## 2020-12-14 NOTE — DISCHARGE NOTE PROVIDER - CARE PROVIDERS DIRECT ADDRESSES
,DirectAddress_Unknown,[email or web address],DirectAddress_Unknown,DirectAddress_Unknown ,DirectAddress_Unknown,[email or web address],DirectAddress_Unknown,DirectAddress_Unknown,DirectAddress_Unknown

## 2020-12-15 ENCOUNTER — TRANSCRIPTION ENCOUNTER (OUTPATIENT)
Age: 60
End: 2020-12-15

## 2020-12-15 VITALS
DIASTOLIC BLOOD PRESSURE: 72 MMHG | HEART RATE: 82 BPM | RESPIRATION RATE: 15 BRPM | OXYGEN SATURATION: 97 % | SYSTOLIC BLOOD PRESSURE: 125 MMHG | TEMPERATURE: 98 F

## 2020-12-15 LAB
INR BLD: 3.06 RATIO — HIGH (ref 0.88–1.16)
PROTHROM AB SERPL-ACNC: 35.1 SEC — HIGH (ref 10.6–13.6)

## 2020-12-15 PROCEDURE — 99232 SBSQ HOSP IP/OBS MODERATE 35: CPT

## 2020-12-15 PROCEDURE — 99239 HOSP IP/OBS DSCHRG MGMT >30: CPT

## 2020-12-15 RX ORDER — WARFARIN SODIUM 2.5 MG/1
1 TABLET ORAL
Qty: 30 | Refills: 1
Start: 2020-12-15 | End: 2021-02-12

## 2020-12-15 RX ADMIN — LEVETIRACETAM 750 MILLIGRAM(S): 250 TABLET, FILM COATED ORAL at 06:04

## 2020-12-15 RX ADMIN — PANTOPRAZOLE SODIUM 40 MILLIGRAM(S): 20 TABLET, DELAYED RELEASE ORAL at 06:05

## 2020-12-15 RX ADMIN — Medication 25 MILLIGRAM(S): at 06:05

## 2020-12-15 RX ADMIN — LIDOCAINE 1 PATCH: 4 CREAM TOPICAL at 00:15

## 2020-12-15 RX ADMIN — AMIODARONE HYDROCHLORIDE 200 MILLIGRAM(S): 400 TABLET ORAL at 06:04

## 2020-12-15 NOTE — PROGRESS NOTE ADULT - NUTRITIONAL ASSESSMENT
This patient has been assessed with a concern for Malnutrition and has been determined to have a diagnosis/diagnoses of Moderate protein-calorie malnutrition.    This patient is being managed with:   Diet Regular-  DASH/TLC {Sodium & Cholesterol Restricted}  For patients receiving Renal Replacement - No Protein Restr No Conc K No Conc Phos Low Sodium  Supplement Feeding Modality:  Oral  Nepro Cans or Servings Per Day:  2       Frequency:  Daily  Entered: Dec  1 2020  6:27PM    
This patient has been assessed with a concern for Malnutrition and has been determined to have a diagnosis/diagnoses of Moderate protein-calorie malnutrition.    This patient is being managed with:   Diet Renal Restrictions-  For patients receiving Renal Replacement - No Protein Restr No Conc K No Conc Phos Low Sodium  DASH/TLC {Sodium & Cholesterol Restricted}  Entered: Dec  9 2020 10:31AM    
This patient has been assessed with a concern for Malnutrition and has been determined to have a diagnosis/diagnoses of Moderate protein-calorie malnutrition.    This patient is being managed with:   Diet Regular-  DASH/TLC {Sodium & Cholesterol Restricted}  For patients receiving Renal Replacement - No Protein Restr No Conc K No Conc Phos Low Sodium  Supplement Feeding Modality:  Oral  Nepro Cans or Servings Per Day:  2       Frequency:  Daily  Entered: Dec  1 2020  6:27PM    
This patient has been assessed with a concern for Malnutrition and has been determined to have a diagnosis/diagnoses of Moderate protein-calorie malnutrition.    This patient is being managed with:   Diet Renal Restrictions-  For patients receiving Renal Replacement - No Protein Restr No Conc K No Conc Phos Low Sodium  DASH/TLC {Sodium & Cholesterol Restricted}  Entered: Dec  9 2020 10:31AM    
This patient has been assessed with a concern for Malnutrition and has been determined to have a diagnosis/diagnoses of Moderate protein-calorie malnutrition.    This patient is being managed with:   Diet Regular-  DASH/TLC {Sodium & Cholesterol Restricted}  1500mL Fluid Restriction ([id number])  For patients receiving Renal Replacement - No Protein Restr No Conc K No Conc Phos Low Sodium  Supplement Feeding Modality:  Oral  Nepro Cans or Servings Per Day:  2       Frequency:  Daily  Entered: Nov 30 2020  6:35PM    
This patient has been assessed with a concern for Malnutrition and has been determined to have a diagnosis/diagnoses of Moderate protein-calorie malnutrition.    This patient is being managed with:   Diet Regular-  DASH/TLC {Sodium & Cholesterol Restricted}  For patients receiving Renal Replacement - No Protein Restr No Conc K No Conc Phos Low Sodium  Supplement Feeding Modality:  Oral  Nepro Cans or Servings Per Day:  2       Frequency:  Daily  Entered: Dec  1 2020  6:27PM    
This patient has been assessed with a concern for Malnutrition and has been determined to have a diagnosis/diagnoses of Moderate protein-calorie malnutrition.    This patient is being managed with:   Diet Renal Restrictions-  For patients receiving Renal Replacement - No Protein Restr No Conc K No Conc Phos Low Sodium  DASH/TLC {Sodium & Cholesterol Restricted}  Entered: Dec  9 2020 10:31AM    
This patient has been assessed with a concern for Malnutrition and has been determined to have a diagnosis/diagnoses of Moderate protein-calorie malnutrition.    This patient is being managed with:   Diet Regular-  DASH/TLC {Sodium & Cholesterol Restricted}  For patients receiving Renal Replacement - No Protein Restr No Conc K No Conc Phos Low Sodium  Supplement Feeding Modality:  Oral  Nepro Cans or Servings Per Day:  2       Frequency:  Daily  Entered: Dec  1 2020  6:27PM

## 2020-12-15 NOTE — PROGRESS NOTE ADULT - REASON FOR ADMISSION
Impairments: AVR  Impairment Codes: 09 Cardiac

## 2020-12-15 NOTE — PROGRESS NOTE ADULT - SUBJECTIVE AND OBJECTIVE BOX
HPI:  JONATHAN GIBSON is a 61yo M with PMHx of CAD s/p PCI to LAD, aortic regurgitation, HTN, thoracic aortic dissection s/p emergent surgery c/b CVA with residual left-sided weakness, colostomy for bowel ischemia s/p reversal, and psoriasis on Humira, who presented to [facility] on 11/02/2020 with worsening SOB with associated chest pressure and LE edema. In the ED, had elevated pro-BNP >33k, SCr, acute anemia, and HTN emergency. Evaluated on admission by cardiology; symptoms improved following IV Lasix. Echocardiogram on admission showing EF 50-55%, enlarged LA, mod-sever MR, mod TR, mod-severe AR, and enlarged aortic root.     Patient evaluated by CT surgery for potential AVR/MVR; required CVVHD medical optimization. GI consulted for acute anemia; patient underwent EGD on 11/09 with no significant findings. Patient underwent sternotomy for CABG x2, AVR, and MVR on 11/13; post-op c/b acute blood loss anemia requiring multiple blood products, pressors, and inotropes. Patient was extubated on 11/16 and underwent permacath placement with IR on 11/20. Pressors and inotropes weaned off and transferred to floors on 11/28. Repeat TTE with doppler showing EF 40% with mod LV systolic dysfunction.    Patient was evaluated by PM&R and therapy for functional deficits and gait/ ADL impairments and recommended acute rehabilitation. Patient was medically optimized for discharge to [facility] on 11/30/2020.    SUBJECTIVE/INTERVAL EVENTS  Patient seen and assessed at bedside. No acute events overnight.   No new issues overnight           Vital Signs Last 24 Hrs  T(C): 36.7 (15 Dec 2020 07:41), Max: 36.9 (14 Dec 2020 17:35)  T(F): 98.1 (15 Dec 2020 07:41), Max: 98.4 (14 Dec 2020 17:35)  HR: 82 (15 Dec 2020 07:41) (75 - 97)  BP: 125/72 (15 Dec 2020 07:41) (107/78 - 143/90)  BP(mean): --  RR: 15 (15 Dec 2020 07:41) (15 - 16)  SpO2: 97% (15 Dec 2020 07:41) (96% - 98%)        Constitutional - NAD, Comfortable  Chest - CTAB  Cardiovascular - RRR  Abdomen - BS+, Soft, NTND  Extremities - LE edema much improved   Motor - left weakness from prior stroke   Psychiatric - Mood stable, Affect WNL  Skin: Sternal incision from sternotomy, c/d/i.  LLE -thigh incision healed, leg- some staples removed yesterday- upper part with scab- - ( reminder staples to be removed at fu next week by CTS)   Lines: Right IJ Permacath on the right chest wall.      RECENT LABS:  PT/INR - ( 15 Dec 2020 05:00 )   PT: 35.1 sec;   INR: 3.06 ratio    PT/INR - ( 14 Dec 2020 06:44 )   PT: 29.8 sec;   INR: 2.58 ratio        MEDICATIONS  (STANDING):  aMIOdarone    Tablet 200 milliGRAM(s) Oral daily  ascorbic acid 500 milliGRAM(s) Oral daily  aspirin enteric coated 81 milliGRAM(s) Oral daily  atorvastatin 40 milliGRAM(s) Oral at bedtime  epoetin yolanda-epbx (RETACRIT) Injectable 15117 Unit(s) IV Push <User Schedule>  folic acid 1 milliGRAM(s) Oral daily  gabapentin 100 milliGRAM(s) Oral at bedtime  iron sucrose IVPB 100 milliGRAM(s) IV Intermittent every 7 days  levETIRAcetam 750 milliGRAM(s) Oral two times a day  melatonin 6 milliGRAM(s) Oral at bedtime  metoprolol tartrate 25 milliGRAM(s) Oral every 12 hours  multivitamin 1 Tablet(s) Oral daily  nystatin Cream 1 Application(s) Topical two times a day  pantoprazole    Tablet 40 milliGRAM(s) Oral two times a day    MEDICATIONS  (PRN):  acetaminophen   Tablet .. 650 milliGRAM(s) Oral every 6 hours PRN Temp greater or equal to 38C (100.4F), Mild Pain (1 - 3)  ALBUTerol    90 MICROgram(s) HFA Inhaler 2 Puff(s) Inhalation every 6 hours PRN Shortness of Breath and/or Wheezing  benzocaine 15 mG/menthol 3.6 mG (Sugar-Free) Lozenge 1 Lozenge Oral two times a day PRN Sore Throat  guaiFENesin   Syrup  (Sugar-Free) 200 milliGRAM(s) Oral every 6 hours PRN Cough

## 2020-12-15 NOTE — PROGRESS NOTE ADULT - ASSESSMENT
60M with PMH CAD s/p PCI to LAD, aortic regurgitation, HTN, thoracic aortic dissection s/p emergent surgery c/b CVA with residual left-sided weakness, colostomy for bowel ischemia s/p reversal, and psoriasis on Humira, presented to [facility] for acute rehab. Post op complications with acute blood loss anemia requiring multiple blood products.    #CAD with AR and MR   #Combined systolic/diastolic CHF  #Debility  -Continue comprehensive rehab program  -s/p sternotomy  with CABG x2, AVR, and MVR 11/13  -Continue Aspirin, Metoprolol, Lipitor    #Atrial flutter  -Continue Metoprolol, Amiodarone   -Continue AC with Coumadin.  -Goal INR 2-3. hold Coumadin tonight.   -Patient to get home draws for INR in the future     #ESRD on HD due to ATN  #Anemia  -HD MWF  -Nephrology on board  -Avoid nephrotoxic agents  -Venofer with HD, Retacrit   -Maintain Hb>8    #Seizure Disorder  -No recent seizures  -Continue Keppra BID     #Bright red blood per rectum - resolved    #Prophylactic Measure  -DVT prophylaxis: sub therapeutic on Coumadin. Coumadin tonight.    Dispo: possible dc home today pending dialysis chair issue

## 2020-12-15 NOTE — PROGRESS NOTE ADULT - SUBJECTIVE AND OBJECTIVE BOX
Patient is a 60y old  Male who presents with a chief complaint of Impairments: AVR  Impairment Codes: 09 Cardiac (14 Dec 2020 19:20)      Patient seen and examined at bedside.    ALLERGIES:  penicillin (Unknown)    MEDICATIONS:  ALBUTerol    90 MICROgram(s) HFA Inhaler 2 Puff(s) Inhalation every 6 hours PRN  guaiFENesin   Syrup  (Sugar-Free) 200 milliGRAM(s) Oral every 6 hours PRN  metoprolol tartrate 25 milliGRAM(s) Oral every 12 hours  nystatin Cream 1 Application(s) Topical two times a day    Vital Signs Last 24 Hrs  T(F): 98.1 (15 Dec 2020 07:41), Max: 98.4 (14 Dec 2020 17:35)  HR: 82 (15 Dec 2020 07:41) (75 - 97)  BP: 125/72 (15 Dec 2020 07:41) (107/78 - 143/90)  RR: 15 (15 Dec 2020 07:41) (15 - 16)  SpO2: 97% (15 Dec 2020 07:41) (96% - 98%)  I&O's Summary    14 Dec 2020 07:01  -  15 Dec 2020 07:00  --------------------------------------------------------  IN: 800 mL / OUT: 3800 mL / NET: -3000 mL        PHYSICAL EXAM:  General: NAD, A/O x 3  ENT: MMM  Neck: Supple, No JVD  Lungs: Clear to auscultation bilaterally, poor air entry at bases   Cardio: RRR, S1/S2, 2/6 holosystolic murmur   Abdomen: Soft, Nontender, Nondistended; Bowel sounds present  Extremities: No cyanosis, No edema    LABS:                        9.0    8.09  )-----------( 292      ( 14 Dec 2020 14:55 )             28.1     12-14    140  |  101  |  90  ----------------------------<  103  3.7   |  23  |  5.00    Ca    8.1      14 Dec 2020 14:55  Phos  5.7     12-14      eGFR if Non African American: 12 mL/min/1.73M2 (12-14-20 @ 14:55)  eGFR if African American: 14 mL/min/1.73M2 (12-14-20 @ 14:55)    PT/INR - ( 15 Dec 2020 05:00 )   PT: 35.1 sec;   INR: 3.06 ratio                 11-15 Chol -- LDL -- HDL -- Trig 105 mg/dL                        RADIOLOGY & ADDITIONAL TESTS:    Care Discussed with Consultants/Other Providers:

## 2020-12-16 ENCOUNTER — TRANSCRIPTION ENCOUNTER (OUTPATIENT)
Age: 60
End: 2020-12-16

## 2020-12-17 ENCOUNTER — TRANSCRIPTION ENCOUNTER (OUTPATIENT)
Age: 60
End: 2020-12-17

## 2020-12-18 ENCOUNTER — TRANSCRIPTION ENCOUNTER (OUTPATIENT)
Age: 60
End: 2020-12-18

## 2020-12-21 ENCOUNTER — APPOINTMENT (OUTPATIENT)
Dept: CARE COORDINATION | Facility: HOME HEALTH | Age: 60
End: 2020-12-21
Payer: MEDICARE

## 2020-12-21 VITALS
RESPIRATION RATE: 15 BRPM | DIASTOLIC BLOOD PRESSURE: 72 MMHG | SYSTOLIC BLOOD PRESSURE: 120 MMHG | HEART RATE: 82 BPM | OXYGEN SATURATION: 97 %

## 2020-12-21 DIAGNOSIS — G40.909 EPILEPSY, UNSPECIFIED, NOT INTRACTABLE, W/OUT STATUS EPILEPTICUS: ICD-10-CM

## 2020-12-21 PROCEDURE — 99024 POSTOP FOLLOW-UP VISIT: CPT

## 2020-12-21 NOTE — REVIEW OF SYSTEMS
[Lower Ext Edema] : lower extremity edema [Negative] : Endocrine [Heart Rate Is Slow] : the heart rate was not slow [Heart Rate Is Fast] : the heart rate was not fast [Chest Pain] : no chest pain [Palpitations] : no palpitations [FreeTextEntry7] : last BM today

## 2020-12-21 NOTE — REASON FOR VISIT
[Follow-Up: _____] : a [unfilled] follow-up visit [FreeTextEntry1] : FOLLOW YOUR HEART- Transitional Care Management Program- [facility]\par \par

## 2020-12-21 NOTE — HISTORY OF PRESENT ILLNESS
[FreeTextEntry1] : 60 YOM with PMHx of CAD s/p PCI to LAD, aortic \par regurgitation, HTN, thoracic aortic dissection s/p emergent surgery c/b CVA \par with residual left-sided weakness, colostomy for bowel ischemia s/p reversal, \par and psoriasis on Humira, who presented to [facility] on 11/02/2020 \par with worsening SOB with associated chest pressure and LE edema. In the ED, had \par elevated pro-BNP >33k, SCr, acute anemia, and HTN emergency. Evaluated on \par admission by cardiology; symptoms improved following IV Lasix. Echocardiogram \par on admission showing EF 50-55%, enlarged LA, mod-sever MR, mod TR, mod-severe \par AR, and enlarged aortic root. \par \par Patient evaluated by CT surgery for AVR/MVR; required CVVHD medical \par optimization. GI consulted for acute anemia; patient underwent EGD on 11/09 \par with no significant findings. Patient underwent sternotomy for CABG x2, AVR, \par and MVR on 11/13; post-op c/b acute blood loss anemia requiring multiple blood \par products, pressors, and inotropes. Patient was extubated on 11/16 and underwent \par permacath placement with IR on 11/20. \par \par Patient was evaluated by PM&R and therapy for functional deficits and gait/ ADL \par impairments and recommended acute rehabilitation. Patient was medically \par optimized for discharge to [facility] on 11/30/2020. Patient was stable \par upon rehab admission to  Inpatient Rehabilitation Facility. Admitted with \par gait instabilty, ADL, and functional impairments. \par \par Rehab Course significant for anemia requiring 1 unit PRBC transfusion with HD \par on 12/4 and an episode of BRBPR on 12/9 that spontaneously resolved. H/H has \par been stable. GI consulted and recommended outpatient colonoscopy. He continued \par to receive HD on MWF schedule. All other medical co-morbidities were stable. \par Partial Staples on left leg removed 12/14/20. Patient has a tendency to have \par supratherapeutic INR on Coumadin 3mg QHS. Patient discharged with Coumadin 1mg \par QHS with close monitoring. \par \par Patient tolerated course of inpatient PT/OT/SLP rehab with significant \par functional improvements and met rehab goals prior to discharge. Patient was \par medically cleared on 12/15/20 for discharged to home. \par \par Pt was referred to home care services though refused upon discharge. Pt initially refused NP visit with no Telehealth capability. Advised NP to remove additional staples, pt agreed to NP visit in home one time as he was able to have his brother take his two "not friendly pit bulls" during NP visit. \par \par Covid screen negative, pt seen in home, CC "I think I'm doing pretty well" \par

## 2020-12-21 NOTE — ASSESSMENT
[FreeTextEntry1] : Pt recovering well at home s/p OHS and rehab course as stated above.  Reviewed all medications and dosages with pt understanding. Pt has all medications in home and is taking as prescribed. Pain controlled with current medication regimen. Pt seen by PCP Srinath on 12/18 and remaining staples removed from incision at that time without incident, INR to be followed by Dr. Yo, confirmed with [facility] labs he is to have in drawn every Thursday, last INR 2.17. Pt on 1 mg. Reviewed diet with pt understanding. Pt to speak with a dietician at HD tomorrow as he is new to HD, pt encouraged to reach out should he have further questions and will refer him to Healthy Living program with [facility]. No new symptoms, issues or concerns to report at this time.\par

## 2020-12-21 NOTE — PHYSICAL EXAM
Last visit 2-26-18, last labs 10-30-17. Toxicology screen 2-26-18. No pain contract.    [Sclera] : the sclera and conjunctiva were normal [Neck Appearance] : the appearance of the neck was normal [Respiration, Rhythm And Depth] : normal respiratory rhythm and effort [Exaggerated Use Of Accessory Muscles For Inspiration] : no accessory muscle use [Auscultation Breath Sounds / Voice Sounds] : lungs were clear to auscultation bilaterally [Heart Sounds] : normal S1 and S2 [Heart Sounds Gallop] : no gallops [Murmurs] : no murmurs [Chest Visual Inspection Thoracic Asymmetry] : no chest asymmetry [1+] : left 1+ [Abnormal Walk] : normal gait [Musculoskeletal - Swelling] : no joint swelling seen [Skin Color & Pigmentation] : normal skin color and pigmentation [Skin Turgor] : normal skin turgor [] : no rash [Oriented To Time, Place, And Person] : oriented to person, place, and time [Impaired Insight] : insight and judgment were intact [Affect] : the affect was normal [FreeTextEntry2] : B/L LE without edema, B/L calves soft, NT [FreeTextEntry1] : Left SVG harvest site without erythema, warmth or drainage, scabs noted

## 2020-12-23 ENCOUNTER — TRANSCRIPTION ENCOUNTER (OUTPATIENT)
Age: 60
End: 2020-12-23

## 2020-12-28 PROCEDURE — 85014 HEMATOCRIT: CPT

## 2020-12-28 PROCEDURE — 76937 US GUIDE VASCULAR ACCESS: CPT

## 2020-12-28 PROCEDURE — 97116 GAIT TRAINING THERAPY: CPT

## 2020-12-28 PROCEDURE — 71275 CT ANGIOGRAPHY CHEST: CPT

## 2020-12-28 PROCEDURE — 83036 HEMOGLOBIN GLYCOSYLATED A1C: CPT

## 2020-12-28 PROCEDURE — 99291 CRITICAL CARE FIRST HOUR: CPT | Mod: 25

## 2020-12-28 PROCEDURE — 97163 PT EVAL HIGH COMPLEX 45 MIN: CPT

## 2020-12-28 PROCEDURE — 82330 ASSAY OF CALCIUM: CPT

## 2020-12-28 PROCEDURE — 84100 ASSAY OF PHOSPHORUS: CPT

## 2020-12-28 PROCEDURE — C1887: CPT

## 2020-12-28 PROCEDURE — 99261: CPT

## 2020-12-28 PROCEDURE — C1894: CPT

## 2020-12-28 PROCEDURE — 86901 BLOOD TYPING SEROLOGIC RH(D): CPT

## 2020-12-28 PROCEDURE — 82728 ASSAY OF FERRITIN: CPT

## 2020-12-28 PROCEDURE — C1889: CPT

## 2020-12-28 PROCEDURE — 82272 OCCULT BLD FECES 1-3 TESTS: CPT

## 2020-12-28 PROCEDURE — 93985 DUP-SCAN HEMO COMPL BI STD: CPT

## 2020-12-28 PROCEDURE — 87640 STAPH A DNA AMP PROBE: CPT

## 2020-12-28 PROCEDURE — 96375 TX/PRO/DX INJ NEW DRUG ADDON: CPT

## 2020-12-28 PROCEDURE — 36558 INSERT TUNNELED CV CATH: CPT

## 2020-12-28 PROCEDURE — 94010 BREATHING CAPACITY TEST: CPT

## 2020-12-28 PROCEDURE — 84466 ASSAY OF TRANSFERRIN: CPT

## 2020-12-28 PROCEDURE — 36430 TRANSFUSION BLD/BLD COMPNT: CPT

## 2020-12-28 PROCEDURE — 80069 RENAL FUNCTION PANEL: CPT

## 2020-12-28 PROCEDURE — P9017: CPT

## 2020-12-28 PROCEDURE — 83605 ASSAY OF LACTIC ACID: CPT

## 2020-12-28 PROCEDURE — 85025 COMPLETE CBC W/AUTO DIFF WBC: CPT

## 2020-12-28 PROCEDURE — 86965 POOLING BLOOD PLATELETS: CPT

## 2020-12-28 PROCEDURE — P9035: CPT

## 2020-12-28 PROCEDURE — P9037: CPT

## 2020-12-28 PROCEDURE — 97530 THERAPEUTIC ACTIVITIES: CPT

## 2020-12-28 PROCEDURE — 84134 ASSAY OF PREALBUMIN: CPT

## 2020-12-28 PROCEDURE — 97110 THERAPEUTIC EXERCISES: CPT

## 2020-12-28 PROCEDURE — 82803 BLOOD GASES ANY COMBINATION: CPT

## 2020-12-28 PROCEDURE — 85027 COMPLETE CBC AUTOMATED: CPT

## 2020-12-28 PROCEDURE — 80202 ASSAY OF VANCOMYCIN: CPT

## 2020-12-28 PROCEDURE — 86022 PLATELET ANTIBODIES: CPT

## 2020-12-28 PROCEDURE — 70496 CT ANGIOGRAPHY HEAD: CPT

## 2020-12-28 PROCEDURE — 88342 IMHCHEM/IMCYTCHM 1ST ANTB: CPT

## 2020-12-28 PROCEDURE — P9011: CPT

## 2020-12-28 PROCEDURE — P9045: CPT

## 2020-12-28 PROCEDURE — 94003 VENT MGMT INPAT SUBQ DAY: CPT

## 2020-12-28 PROCEDURE — 85018 HEMOGLOBIN: CPT

## 2020-12-28 PROCEDURE — 88305 TISSUE EXAM BY PATHOLOGIST: CPT

## 2020-12-28 PROCEDURE — 80076 HEPATIC FUNCTION PANEL: CPT

## 2020-12-28 PROCEDURE — 87641 MR-STAPH DNA AMP PROBE: CPT

## 2020-12-28 PROCEDURE — 85610 PROTHROMBIN TIME: CPT

## 2020-12-28 PROCEDURE — 80053 COMPREHEN METABOLIC PANEL: CPT

## 2020-12-28 PROCEDURE — C1769: CPT

## 2020-12-28 PROCEDURE — 86985 SPLIT BLOOD OR PRODUCTS: CPT

## 2020-12-28 PROCEDURE — 71045 X-RAY EXAM CHEST 1 VIEW: CPT

## 2020-12-28 PROCEDURE — 96374 THER/PROPH/DIAG INJ IV PUSH: CPT

## 2020-12-28 PROCEDURE — 86705 HEP B CORE ANTIBODY IGM: CPT

## 2020-12-28 PROCEDURE — 97167 OT EVAL HIGH COMPLEX 60 MIN: CPT

## 2020-12-28 PROCEDURE — 80061 LIPID PANEL: CPT

## 2020-12-28 PROCEDURE — 82435 ASSAY OF BLOOD CHLORIDE: CPT

## 2020-12-28 PROCEDURE — 86706 HEP B SURFACE ANTIBODY: CPT

## 2020-12-28 PROCEDURE — 93325 DOPPLER ECHO COLOR FLOW MAPG: CPT

## 2020-12-28 PROCEDURE — C1781: CPT

## 2020-12-28 PROCEDURE — 86923 COMPATIBILITY TEST ELECTRIC: CPT

## 2020-12-28 PROCEDURE — 84443 ASSAY THYROID STIM HORMONE: CPT

## 2020-12-28 PROCEDURE — U0003: CPT

## 2020-12-28 PROCEDURE — 93880 EXTRACRANIAL BILAT STUDY: CPT

## 2020-12-28 PROCEDURE — 84132 ASSAY OF SERUM POTASSIUM: CPT

## 2020-12-28 PROCEDURE — 36415 COLL VENOUS BLD VENIPUNCTURE: CPT

## 2020-12-28 PROCEDURE — 86850 RBC ANTIBODY SCREEN: CPT

## 2020-12-28 PROCEDURE — 87635 SARS-COV-2 COVID-19 AMP PRB: CPT

## 2020-12-28 PROCEDURE — 88300 SURGICAL PATH GROSS: CPT

## 2020-12-28 PROCEDURE — P9016: CPT

## 2020-12-28 PROCEDURE — P9012: CPT

## 2020-12-28 PROCEDURE — 84520 ASSAY OF UREA NITROGEN: CPT

## 2020-12-28 PROCEDURE — 74018 RADEX ABDOMEN 1 VIEW: CPT

## 2020-12-28 PROCEDURE — 82962 GLUCOSE BLOOD TEST: CPT

## 2020-12-28 PROCEDURE — 70498 CT ANGIOGRAPHY NECK: CPT

## 2020-12-28 PROCEDURE — 93461 R&L HRT ART/VENTRICLE ANGIO: CPT

## 2020-12-28 PROCEDURE — P9047: CPT

## 2020-12-28 PROCEDURE — 83540 ASSAY OF IRON: CPT

## 2020-12-28 PROCEDURE — 86900 BLOOD TYPING SEROLOGIC ABO: CPT

## 2020-12-28 PROCEDURE — 84295 ASSAY OF SERUM SODIUM: CPT

## 2020-12-28 PROCEDURE — 94660 CPAP INITIATION&MGMT: CPT

## 2020-12-28 PROCEDURE — 94799 UNLISTED PULMONARY SVC/PX: CPT

## 2020-12-28 PROCEDURE — 85730 THROMBOPLASTIN TIME PARTIAL: CPT

## 2020-12-28 PROCEDURE — 99152 MOD SED SAME PHYS/QHP 5/>YRS: CPT

## 2020-12-28 PROCEDURE — 86704 HEP B CORE ANTIBODY TOTAL: CPT

## 2020-12-28 PROCEDURE — 86803 HEPATITIS C AB TEST: CPT

## 2020-12-28 PROCEDURE — P9040: CPT

## 2020-12-28 PROCEDURE — 84478 ASSAY OF TRIGLYCERIDES: CPT

## 2020-12-28 PROCEDURE — 82947 ASSAY GLUCOSE BLOOD QUANT: CPT

## 2020-12-28 PROCEDURE — C1751: CPT

## 2020-12-28 PROCEDURE — 86769 SARS-COV-2 COVID-19 ANTIBODY: CPT

## 2020-12-28 PROCEDURE — 99153 MOD SED SAME PHYS/QHP EA: CPT

## 2020-12-28 PROCEDURE — 36600 WITHDRAWAL OF ARTERIAL BLOOD: CPT

## 2020-12-28 PROCEDURE — 83735 ASSAY OF MAGNESIUM: CPT

## 2020-12-28 PROCEDURE — 97535 SELF CARE MNGMENT TRAINING: CPT

## 2020-12-28 PROCEDURE — 93567 NJX CAR CTH SPRVLV AORTGRPHY: CPT

## 2020-12-28 PROCEDURE — 84484 ASSAY OF TROPONIN QUANT: CPT

## 2020-12-28 PROCEDURE — 83550 IRON BINDING TEST: CPT

## 2020-12-28 PROCEDURE — 94760 N-INVAS EAR/PLS OXIMETRY 1: CPT

## 2020-12-28 PROCEDURE — 93320 DOPPLER ECHO COMPLETE: CPT

## 2020-12-28 PROCEDURE — 93306 TTE W/DOPPLER COMPLETE: CPT

## 2020-12-28 PROCEDURE — 31720 CLEARANCE OF AIRWAYS: CPT

## 2020-12-28 PROCEDURE — 80048 BASIC METABOLIC PNL TOTAL CA: CPT

## 2020-12-28 PROCEDURE — C1750: CPT

## 2020-12-28 PROCEDURE — 83880 ASSAY OF NATRIURETIC PEPTIDE: CPT

## 2020-12-28 PROCEDURE — 85379 FIBRIN DEGRADATION QUANT: CPT

## 2020-12-28 PROCEDURE — 93005 ELECTROCARDIOGRAM TRACING: CPT

## 2020-12-28 PROCEDURE — 93312 ECHO TRANSESOPHAGEAL: CPT

## 2020-12-28 PROCEDURE — 87340 HEPATITIS B SURFACE AG IA: CPT

## 2020-12-29 ENCOUNTER — NON-APPOINTMENT (OUTPATIENT)
Age: 60
End: 2020-12-29

## 2020-12-30 ENCOUNTER — APPOINTMENT (OUTPATIENT)
Dept: CARDIOTHORACIC SURGERY | Facility: CLINIC | Age: 60
End: 2020-12-30
Payer: MEDICARE

## 2020-12-30 VITALS
HEART RATE: 78 BPM | WEIGHT: 180 LBS | BODY MASS INDEX: 27.28 KG/M2 | SYSTOLIC BLOOD PRESSURE: 144 MMHG | DIASTOLIC BLOOD PRESSURE: 85 MMHG | OXYGEN SATURATION: 97 % | HEIGHT: 68 IN | RESPIRATION RATE: 16 BRPM

## 2020-12-30 DIAGNOSIS — Z78.9 OTHER SPECIFIED HEALTH STATUS: ICD-10-CM

## 2020-12-30 DIAGNOSIS — Z86.79 PERSONAL HISTORY OF OTHER DISEASES OF THE CIRCULATORY SYSTEM: ICD-10-CM

## 2020-12-30 DIAGNOSIS — Z82.49 FAMILY HISTORY OF ISCHEMIC HEART DISEASE AND OTHER DISEASES OF THE CIRCULATORY SYSTEM: ICD-10-CM

## 2020-12-30 DIAGNOSIS — Z87.891 PERSONAL HISTORY OF NICOTINE DEPENDENCE: ICD-10-CM

## 2020-12-30 PROCEDURE — 99024 POSTOP FOLLOW-UP VISIT: CPT

## 2020-12-31 ENCOUNTER — TRANSCRIPTION ENCOUNTER (OUTPATIENT)
Age: 60
End: 2020-12-31

## 2020-12-31 DIAGNOSIS — E83.39 OTHER DISORDERS OF PHOSPHORUS METABOLISM: ICD-10-CM

## 2021-01-05 ENCOUNTER — NON-APPOINTMENT (OUTPATIENT)
Age: 61
End: 2021-01-05

## 2021-01-05 ENCOUNTER — APPOINTMENT (OUTPATIENT)
Dept: CARDIOLOGY | Facility: CLINIC | Age: 61
End: 2021-01-05
Payer: MEDICARE

## 2021-01-05 VITALS — SYSTOLIC BLOOD PRESSURE: 126 MMHG | DIASTOLIC BLOOD PRESSURE: 70 MMHG

## 2021-01-05 VITALS
DIASTOLIC BLOOD PRESSURE: 62 MMHG | HEART RATE: 85 BPM | RESPIRATION RATE: 16 BRPM | HEIGHT: 68 IN | OXYGEN SATURATION: 99 % | TEMPERATURE: 97.1 F | BODY MASS INDEX: 27.89 KG/M2 | WEIGHT: 184 LBS | SYSTOLIC BLOOD PRESSURE: 120 MMHG

## 2021-01-05 DIAGNOSIS — Z60.2 PROBLEMS RELATED TO LIVING ALONE: ICD-10-CM

## 2021-01-05 PROCEDURE — 99204 OFFICE O/P NEW MOD 45 MIN: CPT

## 2021-01-05 PROCEDURE — 93000 ELECTROCARDIOGRAM COMPLETE: CPT

## 2021-01-05 RX ORDER — FOLIC ACID 1 MG/1
1 TABLET ORAL
Qty: 90 | Refills: 3 | Status: DISCONTINUED | COMMUNITY
End: 2021-01-05

## 2021-01-05 RX ORDER — ADALIMUMAB 40MG/0.4ML
40 KIT SUBCUTANEOUS
Qty: 2 | Refills: 0 | Status: COMPLETED | COMMUNITY
Start: 2020-10-13

## 2021-01-05 RX ORDER — PANTOPRAZOLE 40 MG/1
40 TABLET, DELAYED RELEASE ORAL
Qty: 14 | Refills: 0 | Status: DISCONTINUED | COMMUNITY
End: 2021-01-05

## 2021-01-05 RX ORDER — ALBUTEROL SULFATE 90 UG/1
108 (90 BASE) INHALANT RESPIRATORY (INHALATION)
Qty: 8 | Refills: 0 | Status: COMPLETED | COMMUNITY
Start: 2020-09-22

## 2021-01-05 SDOH — SOCIAL STABILITY - SOCIAL INSECURITY: PROBLEMS RELATED TO LIVING ALONE: Z60.2

## 2021-01-06 PROCEDURE — 85610 PROTHROMBIN TIME: CPT

## 2021-01-06 PROCEDURE — 80048 BASIC METABOLIC PNL TOTAL CA: CPT

## 2021-01-06 PROCEDURE — 84165 PROTEIN E-PHORESIS SERUM: CPT

## 2021-01-06 PROCEDURE — 84100 ASSAY OF PHOSPHORUS: CPT

## 2021-01-06 PROCEDURE — 83540 ASSAY OF IRON: CPT

## 2021-01-06 PROCEDURE — 97167 OT EVAL HIGH COMPLEX 60 MIN: CPT

## 2021-01-06 PROCEDURE — 82272 OCCULT BLD FECES 1-3 TESTS: CPT

## 2021-01-06 PROCEDURE — 85027 COMPLETE CBC AUTOMATED: CPT

## 2021-01-06 PROCEDURE — 87635 SARS-COV-2 COVID-19 AMP PRB: CPT

## 2021-01-06 PROCEDURE — 83550 IRON BINDING TEST: CPT

## 2021-01-06 PROCEDURE — 86900 BLOOD TYPING SEROLOGIC ABO: CPT

## 2021-01-06 PROCEDURE — 97535 SELF CARE MNGMENT TRAINING: CPT

## 2021-01-06 PROCEDURE — 97112 NEUROMUSCULAR REEDUCATION: CPT

## 2021-01-06 PROCEDURE — 93005 ELECTROCARDIOGRAM TRACING: CPT

## 2021-01-06 PROCEDURE — 97530 THERAPEUTIC ACTIVITIES: CPT

## 2021-01-06 PROCEDURE — 86850 RBC ANTIBODY SCREEN: CPT

## 2021-01-06 PROCEDURE — 92507 TX SP LANG VOICE COMM INDIV: CPT

## 2021-01-06 PROCEDURE — 86923 COMPATIBILITY TEST ELECTRIC: CPT

## 2021-01-06 PROCEDURE — 84155 ASSAY OF PROTEIN SERUM: CPT

## 2021-01-06 PROCEDURE — 85025 COMPLETE CBC W/AUTO DIFF WBC: CPT

## 2021-01-06 PROCEDURE — 36430 TRANSFUSION BLD/BLD COMPNT: CPT

## 2021-01-06 PROCEDURE — 82728 ASSAY OF FERRITIN: CPT

## 2021-01-06 PROCEDURE — 85018 HEMOGLOBIN: CPT

## 2021-01-06 PROCEDURE — 86706 HEP B SURFACE ANTIBODY: CPT

## 2021-01-06 PROCEDURE — 86901 BLOOD TYPING SEROLOGIC RH(D): CPT

## 2021-01-06 PROCEDURE — 86803 HEPATITIS C AB TEST: CPT

## 2021-01-06 PROCEDURE — 82746 ASSAY OF FOLIC ACID SERUM: CPT

## 2021-01-06 PROCEDURE — 97163 PT EVAL HIGH COMPLEX 45 MIN: CPT

## 2021-01-06 PROCEDURE — 94640 AIRWAY INHALATION TREATMENT: CPT

## 2021-01-06 PROCEDURE — 36415 COLL VENOUS BLD VENIPUNCTURE: CPT

## 2021-01-06 PROCEDURE — 80053 COMPREHEN METABOLIC PANEL: CPT

## 2021-01-06 PROCEDURE — 87340 HEPATITIS B SURFACE AG IA: CPT

## 2021-01-06 PROCEDURE — 97116 GAIT TRAINING THERAPY: CPT

## 2021-01-06 PROCEDURE — U0003: CPT

## 2021-01-06 PROCEDURE — P9016: CPT

## 2021-01-06 PROCEDURE — 90686 IIV4 VACC NO PRSV 0.5 ML IM: CPT

## 2021-01-06 PROCEDURE — 71045 X-RAY EXAM CHEST 1 VIEW: CPT

## 2021-01-06 PROCEDURE — 85014 HEMATOCRIT: CPT

## 2021-01-06 PROCEDURE — 92523 SPEECH SOUND LANG COMPREHEN: CPT

## 2021-01-06 PROCEDURE — 97110 THERAPEUTIC EXERCISES: CPT

## 2021-01-06 PROCEDURE — 82607 VITAMIN B-12: CPT

## 2021-01-14 ENCOUNTER — APPOINTMENT (OUTPATIENT)
Dept: VASCULAR SURGERY | Facility: CLINIC | Age: 61
End: 2021-01-14
Payer: MEDICARE

## 2021-01-14 VITALS
HEIGHT: 67 IN | SYSTOLIC BLOOD PRESSURE: 130 MMHG | OXYGEN SATURATION: 97 % | BODY MASS INDEX: 28.25 KG/M2 | WEIGHT: 180 LBS | TEMPERATURE: 98 F | DIASTOLIC BLOOD PRESSURE: 80 MMHG | HEART RATE: 80 BPM

## 2021-01-14 PROCEDURE — 93986 DUP-SCAN HEMO COMPL UNI STD: CPT

## 2021-01-14 PROCEDURE — 99203 OFFICE O/P NEW LOW 30 MIN: CPT

## 2021-01-14 NOTE — HISTORY OF PRESENT ILLNESS
[FreeTextEntry1] : 60 year old man with history of Type A aortic dissection s/p repair in 2010 and recent CABG x2, Mitral valve and aortic valve replacement in November 2020. Patient has been on HD via a right IJ tunneled HD catheter since November 2020. Patient referred here for long term AV access.\par Patient is right hand dominant. he had a previous stroke and has some residual left arm weakness

## 2021-01-14 NOTE — PHYSICAL EXAM
[2+] : left 2+ [No Rash or Lesion] : No rash or lesion [Alert] : alert [Oriented to Person] : oriented to person [Oriented to Place] : oriented to place [Oriented to Time] : oriented to time [Calm] : calm [JVD] : no jugular venous distention  [Ankle Swelling (On Exam)] : not present [Varicose Veins Of Lower Extremities] : not present [] : not present [Abdomen Masses] : No abdominal masses [Abdomen Tenderness] : ~T ~M No abdominal tenderness [de-identified] : Well appearing [de-identified] : NCAT [de-identified] : Supple

## 2021-01-14 NOTE — ASSESSMENT
[FreeTextEntry1] : 60 year old man with history of Type A aortic dissection s/p repair in 2010 and recent CABG x2, Mitral valve and aortic valve replacement in November 2020. Patient has been on HD via a right IJ tunneled HD catheter since November 2020. Patient referred here for long term AV access.\par Vein mapping performed today shows patent arterial system and usable basilic vein\par Will plan for left arm AV fistula creation

## 2021-01-15 NOTE — HISTORY OF PRESENT ILLNESS
[FreeTextEntry1] : Patient with history of Type A aortic dissection then repeat sternotomy in 11/2020 with CABG x2, systolic CHF, Mitral valve and aortic valve replacement. \par ESRD on HD with a tunneled catheter. \par Patient has had history of medication non compliance and now taking all medications. \par Right hand with limited per patient since surgery. Normal motion/normal nerve function. \par

## 2021-01-15 NOTE — DISCUSSION/SUMMARY
[FreeTextEntry1] : 1. ESRD: Needs av fistula. To see Dr. Argueta. Patient low risk for planned procedure. \par 2. Afib: Stop amiodarone, Continue coumadin. \par 3. GERD: protonix. \par 4. CAD: Continue aspirin. \par 5. AS/MR: TTE to assess valves post surgery. \par 6. Follow up 4-6 weeks. \par

## 2021-01-15 NOTE — PHYSICAL EXAM
[General Appearance - Well Developed] : well developed [Normal Conjunctiva] : the conjunctiva exhibited no abnormalities [Normal Oral Mucosa] : normal oral mucosa [Normal Jugular Venous V Waves Present] : normal jugular venous V waves present [FreeTextEntry1] : irregular  [] : no respiratory distress [Bowel Sounds] : normal bowel sounds [Abnormal Walk] : normal gait [Nail Clubbing] : no clubbing of the fingernails [Skin Color & Pigmentation] : normal skin color and pigmentation [Oriented To Time, Place, And Person] : oriented to person, place, and time

## 2021-01-22 ENCOUNTER — APPOINTMENT (OUTPATIENT)
Dept: NEPHROLOGY | Facility: CLINIC | Age: 61
End: 2021-01-22

## 2021-01-25 ENCOUNTER — RESULT REVIEW (OUTPATIENT)
Age: 61
End: 2021-01-25

## 2021-01-25 ENCOUNTER — OUTPATIENT (OUTPATIENT)
Dept: OUTPATIENT SERVICES | Facility: HOSPITAL | Age: 61
LOS: 1 days | End: 2021-01-25
Payer: MEDICARE

## 2021-01-25 VITALS
WEIGHT: 128.97 LBS | DIASTOLIC BLOOD PRESSURE: 70 MMHG | HEIGHT: 69 IN | RESPIRATION RATE: 18 BRPM | HEART RATE: 82 BPM | SYSTOLIC BLOOD PRESSURE: 120 MMHG | TEMPERATURE: 98 F | OXYGEN SATURATION: 95 %

## 2021-01-25 DIAGNOSIS — Z29.9 ENCOUNTER FOR PROPHYLACTIC MEASURES, UNSPECIFIED: ICD-10-CM

## 2021-01-25 DIAGNOSIS — I48.91 UNSPECIFIED ATRIAL FIBRILLATION: ICD-10-CM

## 2021-01-25 DIAGNOSIS — Z01.818 ENCOUNTER FOR OTHER PREPROCEDURAL EXAMINATION: ICD-10-CM

## 2021-01-25 DIAGNOSIS — Z13.89 ENCOUNTER FOR SCREENING FOR OTHER DISORDER: ICD-10-CM

## 2021-01-25 DIAGNOSIS — I10 ESSENTIAL (PRIMARY) HYPERTENSION: ICD-10-CM

## 2021-01-25 DIAGNOSIS — N18.6 END STAGE RENAL DISEASE: ICD-10-CM

## 2021-01-25 DIAGNOSIS — I77.9 DISORDER OF ARTERIES AND ARTERIOLES, UNSPECIFIED: Chronic | ICD-10-CM

## 2021-01-25 DIAGNOSIS — Z90.49 ACQUIRED ABSENCE OF OTHER SPECIFIED PARTS OF DIGESTIVE TRACT: Chronic | ICD-10-CM

## 2021-01-25 DIAGNOSIS — E78.5 HYPERLIPIDEMIA, UNSPECIFIED: ICD-10-CM

## 2021-01-25 DIAGNOSIS — Z95.1 PRESENCE OF AORTOCORONARY BYPASS GRAFT: Chronic | ICD-10-CM

## 2021-01-25 LAB
A1C WITH ESTIMATED AVERAGE GLUCOSE RESULT: 4.9 % — SIGNIFICANT CHANGE UP (ref 4–5.6)
ANION GAP SERPL CALC-SCNC: 16 MMOL/L — SIGNIFICANT CHANGE UP (ref 5–17)
APTT BLD: 49.3 SEC — HIGH (ref 27.5–35.5)
BASOPHILS # BLD AUTO: 0.06 K/UL — SIGNIFICANT CHANGE UP (ref 0–0.2)
BASOPHILS NFR BLD AUTO: 0.8 % — SIGNIFICANT CHANGE UP (ref 0–2)
BLD GP AB SCN SERPL QL: SIGNIFICANT CHANGE UP
BUN SERPL-MCNC: 56 MG/DL — HIGH (ref 8–20)
CALCIUM SERPL-MCNC: 8.8 MG/DL — SIGNIFICANT CHANGE UP (ref 8.6–10.2)
CHLORIDE SERPL-SCNC: 100 MMOL/L — SIGNIFICANT CHANGE UP (ref 98–107)
CO2 SERPL-SCNC: 26 MMOL/L — SIGNIFICANT CHANGE UP (ref 22–29)
CREAT SERPL-MCNC: 4.77 MG/DL — HIGH (ref 0.5–1.3)
EOSINOPHIL # BLD AUTO: 0.49 K/UL — SIGNIFICANT CHANGE UP (ref 0–0.5)
EOSINOPHIL NFR BLD AUTO: 6.8 % — HIGH (ref 0–6)
ESTIMATED AVERAGE GLUCOSE: 94 MG/DL — SIGNIFICANT CHANGE UP (ref 68–114)
GLUCOSE SERPL-MCNC: 89 MG/DL — SIGNIFICANT CHANGE UP (ref 70–99)
HCT VFR BLD CALC: 31.5 % — LOW (ref 39–50)
HGB BLD-MCNC: 9.7 G/DL — LOW (ref 13–17)
IMM GRANULOCYTES NFR BLD AUTO: 0.3 % — SIGNIFICANT CHANGE UP (ref 0–1.5)
INR BLD: 4.18 RATIO — HIGH (ref 0.88–1.16)
LYMPHOCYTES # BLD AUTO: 2.25 K/UL — SIGNIFICANT CHANGE UP (ref 1–3.3)
LYMPHOCYTES # BLD AUTO: 31.3 % — SIGNIFICANT CHANGE UP (ref 13–44)
MCHC RBC-ENTMCNC: 29.3 PG — SIGNIFICANT CHANGE UP (ref 27–34)
MCHC RBC-ENTMCNC: 30.8 GM/DL — LOW (ref 32–36)
MCV RBC AUTO: 95.2 FL — SIGNIFICANT CHANGE UP (ref 80–100)
MONOCYTES # BLD AUTO: 0.49 K/UL — SIGNIFICANT CHANGE UP (ref 0–0.9)
MONOCYTES NFR BLD AUTO: 6.8 % — SIGNIFICANT CHANGE UP (ref 2–14)
NEUTROPHILS # BLD AUTO: 3.89 K/UL — SIGNIFICANT CHANGE UP (ref 1.8–7.4)
NEUTROPHILS NFR BLD AUTO: 54 % — SIGNIFICANT CHANGE UP (ref 43–77)
PLATELET # BLD AUTO: 182 K/UL — SIGNIFICANT CHANGE UP (ref 150–400)
POTASSIUM SERPL-MCNC: 3.8 MMOL/L — SIGNIFICANT CHANGE UP (ref 3.5–5.3)
POTASSIUM SERPL-SCNC: 3.8 MMOL/L — SIGNIFICANT CHANGE UP (ref 3.5–5.3)
PROTHROM AB SERPL-ACNC: 45.3 SEC — HIGH (ref 10.6–13.6)
RBC # BLD: 3.31 M/UL — LOW (ref 4.2–5.8)
RBC # FLD: 15.3 % — HIGH (ref 10.3–14.5)
SODIUM SERPL-SCNC: 142 MMOL/L — SIGNIFICANT CHANGE UP (ref 135–145)
WBC # BLD: 7.2 K/UL — SIGNIFICANT CHANGE UP (ref 3.8–10.5)
WBC # FLD AUTO: 7.2 K/UL — SIGNIFICANT CHANGE UP (ref 3.8–10.5)

## 2021-01-25 PROCEDURE — 71046 X-RAY EXAM CHEST 2 VIEWS: CPT | Mod: 26

## 2021-01-25 PROCEDURE — 71046 X-RAY EXAM CHEST 2 VIEWS: CPT

## 2021-01-25 PROCEDURE — G0463: CPT

## 2021-01-25 NOTE — H&P PST ADULT - ASSESSMENT
CAPRINI SCORE [CLOT]    AGE RELATED RISK FACTORS                                                       MOBILITY RELATED FACTORS  [X ] Age 41-60 years                                            (1 Point)                  [ ] Bed rest                                                        (1 Point)  [ ] Age: 61-74 years                                           (2 Points)                 [ ] Plaster cast                                                   (2 Points)  [ ] Age= 75 years                                              (3 Points)                 [ ] Bed bound for more than 72 hours                 (2 Points)    DISEASE RELATED RISK FACTORS                                               GENDER SPECIFIC FACTORS  [ X] Edema in the lower extremities                       (1 Point)                  [ ] Pregnancy                                                     (1 Point)  [ ] Varicose veins                                               (1 Point)                  [ ] Post-partum < 6 weeks                                   (1 Point)             [ ] BMI > 25 Kg/m2                                            (1 Point)                  [ ] Hormonal therapy  or oral contraception          (1 Point)                 [ ] Sepsis (in the previous month)                        (1 Point)                  [ ] History of pregnancy complications                 (1 point)  [ ] Pneumonia or serious lung disease                                               [ ] Unexplained or recurrent                     (1 Point)           (in the previous month)                               (1 Point)  [ ] Abnormal pulmonary function test                     (1 Point)                 SURGERY RELATED RISK FACTORS  [ ] Acute myocardial infarction                              (1 Point)                 [ ]  Section                                             (1 Point)  [ ] Congestive heart failure (in the previous month)  (1 Point)               [ ] Minor surgery                                                  (1 Point)   [ ] Inflammatory bowel disease                             (1 Point)                 [ ] Arthroscopic surgery                                        (2 Points)  [ ] Central venous access                                      (2 Points)                [X ] General surgery lasting more than 45 minutes   (2 Points)       [ ] Stroke (in the previous month)                          (5 Points)               [ ] Elective arthroplasty                                         (5 Points)                                                                                                                                               HEMATOLOGY RELATED FACTORS                                                 TRAUMA RELATED RISK FACTORS  [ ] Prior episodes of VTE                                     (3 Points)                [ ] Fracture of the hip, pelvis, or leg                       (5 Points)  [ ] Positive family history for VTE                         (3 Points)                 [ ] Acute spinal cord injury (in the previous month)  (5 Points)  [ ] Prothrombin 89064 A                                     (3 Points)                 [ ] Paralysis  (less than 1 month)                             (5 Points)  [ ] Factor V Leiden                                             (3 Points)                  [ ] Multiple Trauma within 1 month                        (5 Points)  [ ] Lupus anticoagulants                                     (3 Points)                                                           [ ] Anticardiolipin antibodies                               (3 Points)                                                       [ ] High homocysteine in the blood                      (3 Points)                                             [ ] Other congenital or acquired thrombophilia      (3 Points)                                                [ ] Heparin induced thrombocytopenia                  (3 Points)                                          Total Score [      4    ]    Caprini Score 0 - 2:  Low Risk, No VTE Prophylaxis required for most patients, encourage ambulation  Caprini Score 3 - 6:  At Risk, pharmacologic VTE prophylaxis is indicated for most patients (in the absence of a contraindication)  Caprini Score Greater than or = 7:  High Risk, pharmacologic VTE prophylaxis is indicated for most patients (in the absence of a contraindication)    OPIOID RISK TOOL    KOMAL EACH BOX THAT APPLIES AND ADD TOTALS AT THE END    FAMILY HISTORY OF SUBSTANCE ABUSE                 FEMALE         MALE                                                Alcohol                             [  ]1 pt          [  ]3pts                                               Illegal Durgs                     [  ]2 pts        [  ]3pts                                               Rx Drugs                           [  ]4 pts        [  ]4 pts    PERSONAL HISTORY OF SUBSTANCE ABUSE                                                                                          Alcohol                             [  ]3 pts       [  ]3 pts                                               Illegal Drugs                     [  ]4 pts        [ X ]4 pts                                               Rx Drugs                           [  ]5 pts        [  ]5 pts    AGE BETWEEN 16-45 YEARS                                      [  ]1 pt         [  ]1 pt    HISTORY OF PREADOLESCENT   SEXUAL ABUSE                                                             [  ]3 pts        [  ]0pts    PSYCHOLOGICAL DISEASE                     ADD, OCD, Bipolar, Schizophrenia        [  ]2 pts         [  ]2 pts                      Depression                                               [  ]1 pt           [  ]1 pt           SCORING TOTAL   (add numbers and type here)              (**4*)                                     A score of 3 or lower indicated LOW risk for future opioid abuse  A score of 4 to 7 indicated moderate risk for future opioid abuse  A score of 8 or higher indicates a high risk for opioid abuse    This is an A&O x3 60 year old male poor historian with PMH ESRD on HD, Seizure, HLD, A-fib, CHF, CVA, Covid 19, former cocaine abuse and HTN, PSH mitral and aortic valve replacement and double bypass (2020) present today for PST. Patient c/o need for AV fistula. Patient states right IJ tunneled catheter was placed in 2020 to start HD. Patient goes to HD 3x a week on Tues/Thurs/Sat. States dressing is changed 3x a week after HD. Denies, SOB, chest pain or peripheral edema. Patient reports he still urinates but "not as much as before". Denies dysuria, hematuria or flank pain. He is now scheduled for left upper extremity AV fistula, possible AV graft with Dr. Argueta on  pending cardiology and medical clearance.   Patient to have medical clearance with Dr. Gibson [phone] on . Patient to have cardiac clearance with Dr. Frankel [phone].  Patient  instructed to review anticoagulant medication with PCP /cardiologist for instructions when to stop prior to surgery.  Patient educated on surgical scrub, COVID testing, preadmission instructions, medical clearance and day of procedure medications, verbalizes understanding.

## 2021-01-25 NOTE — H&P PST ADULT - ATTENDING COMMENTS
60 year old man with ESRD  Plan is for left arm AV fistula creation, possible graft.  Risks and benefits of the planned procedure discussed with patient, all questions answered

## 2021-01-25 NOTE — H&P PST ADULT - HISTORY OF PRESENT ILLNESS
60 year old male   diagnosis of kidney failure in November 2020  HD started in hospital patient goes to HD t/t/sat  right chest wall port  denies SOB, chest pain,   still urinates denies dysuria, hematuria  poor historian 60 year old male poor historian with PMH ESRD on HD, Seizure, HLD, A-fib, CHF, CVA, Covid 19, former cocaine abuse and HTN, PSH mitral and aortic valve replacement and double bypass (November 2020) present today for PST. Patient c/o need for AV fistula. Patient states right IJ tunneled catheter was placed in November 2020 to start HD. Patient goes to HD 3x a week on Tues/Thurs/Sat. States dressing is changed 3x a week after HD. Denies, SOB, chest pain or peripheral edema. Patient reports he still urinates but "not as much as before". Denies dysuria, hematuria or flank pain. He is now scheduled for left upper extremity AV fistula, possible AV graft with Dr. Argueta on 1/29 pending cardiology and medical clearance.

## 2021-01-25 NOTE — H&P PST ADULT - NS MD HP INPLANTS MED DEV
right chest wall cath/Vascular stents/Clips right IJ tunneled catheter to right chest wall, mitral and aortic valve replacement/Vascular stents/Clips

## 2021-01-25 NOTE — H&P PST ADULT - RS GEN PE MLT RESP DETAILS PC
breath sounds equal/good air movement/respirations non-labored/wheezes airway patent/respirations non-labored/diminished breath sounds, L/wheezes

## 2021-01-25 NOTE — H&P PST ADULT - ADDITIONAL PE
right chest wall, IJ tunneled catheter in place, dressing clean, dry and intact, no edema or erythema noted to site.

## 2021-01-25 NOTE — H&P PST ADULT - NSICDXFAMILYHX_GEN_ALL_CORE_FT
FAMILY HISTORY:  FH: hypertension     FAMILY HISTORY:  Family history of cardiac disorder, mother  FH: hypertension

## 2021-01-25 NOTE — H&P PST ADULT - NSICDXPASTSURGICALHX_GEN_ALL_CORE_FT
PAST SURGICAL HISTORY:  Aorta disorder     H/O colectomy     Status post double vessel coronary artery bypass

## 2021-01-25 NOTE — H&P PST ADULT - NSICDXPASTMEDICALHX_GEN_ALL_CORE_FT
PAST MEDICAL HISTORY:  CHF (congestive heart failure)     Chronic kidney disease     COVID-19 october 2020    CVA (cerebral vascular accident)     HTN (hypertension)     Hyperlipemia     Seizures last seizure 10 years ago     PAST MEDICAL HISTORY:  Atrial fibrillation     CHF (congestive heart failure)     COVID-19 october 2020    CVA (cerebral vascular accident)     ESRD on dialysis     Former smoker     History of cocaine use quit "many years ago"    HTN (hypertension)     Hyperlipemia     Seizures last seizure 10 years ago

## 2021-01-25 NOTE — H&P PST ADULT - NSANTHOSAYNRD_GEN_A_CORE
No. COLLEEN screening performed.  STOP BANG Legend: 0-2 = LOW Risk; 3-4 = INTERMEDIATE Risk; 5-8 = HIGH Risk

## 2021-01-25 NOTE — H&P PST ADULT - MUSCULOSKELETAL
details… ROM intact/no joint swelling/no joint erythema/no calf tenderness/normal strength detailed exam

## 2021-01-25 NOTE — H&P PST ADULT - LAB RESULTS AND INTERPRETATION
results pending labs faxed to PCP Dr Yo  INR 4.18, pt to hold coumadin for procedure starting 1/26, pt verbalized understanding Hector  will have INR checked at PCP office 1/27.

## 2021-01-25 NOTE — ASU PATIENT PROFILE, ADULT - LEARNING ASSESSMENT (PATIENT) ADDITIONAL COMMENTS
NP, instructed pt on pre-op instructions/teaching, tips for safer surgery, pain management scale, pre-surgical infection prevention instructions, covid swab appt info (1/26), medical/cardiac clearances pending, continue ASA and stop Coumadin 3 days prior to surgery, last dose on 1/25, as per Dr Argueta, pt verbalized understanding of all instructions given.

## 2021-01-25 NOTE — H&P PST ADULT - NSICDXPROBLEM_GEN_ALL_CORE_FT
PROBLEM DIAGNOSES  Problem: Screening for substance abuse  Assessment and Plan: Patient with history of cocaine abuse, states "many years since last use".  ORT score 4 patient at Moderate Risk.      Problem: Need for prophylactic measure  Assessment and Plan: CAP score 4 patient Moderate Risk, SCD ordered for day of procedure. Surgical team to assess need for VTE prophylaxis.      Problem: HTN (hypertension)  Assessment and Plan: .Continue medications as instructed. Blood pressure in office today well controlled. Take metroplol morning of procedure with sip of water. MEdical and cardiology clearance pending.    Problem: Atrial fibrillation  Assessment and Plan: Patient currently on coumadin. Pt/PTT/INR drawn today. As per MD orders patient to stop coumadin 3 days prior to surgery and continue aspirin. Patient vebalized understanding.     Problem: Hyperlipemia  Assessment and Plan: Continue medications as instructed. Patient scheduled for medical and cardiology clearance.     Problem: End stage renal disease  Assessment and Plan: Patient on HD 3x a week tues/thurs/sat, currently using right IJ tunneled catheter. Site  clean, dry and intact with no edema or erythema noted. Scheduled for a left AV fistula, possible AV graft on 1/29 with Dr. Argueta pending medical and cardiology clearance.

## 2021-01-25 NOTE — ASU PATIENT PROFILE, ADULT - VISION (WITH CORRECTIVE LENSES IF THE PATIENT USUALLY WEARS THEM):
otc reading/Partially impaired: cannot see medication labels or newsprint, but can see obstacles in path, and the surrounding layout; can count fingers at arm's length

## 2021-01-26 ENCOUNTER — APPOINTMENT (OUTPATIENT)
Dept: DISASTER EMERGENCY | Facility: CLINIC | Age: 61
End: 2021-01-26

## 2021-01-27 LAB — SARS-COV-2 N GENE NPH QL NAA+PROBE: NOT DETECTED

## 2021-01-28 ENCOUNTER — TRANSCRIPTION ENCOUNTER (OUTPATIENT)
Age: 61
End: 2021-01-28

## 2021-01-29 ENCOUNTER — INPATIENT (INPATIENT)
Facility: HOSPITAL | Age: 61
LOS: 0 days | Discharge: HOME | DRG: 264 | End: 2021-01-30
Attending: SURGERY | Admitting: SURGERY
Payer: MEDICARE

## 2021-01-29 VITALS
HEART RATE: 68 BPM | DIASTOLIC BLOOD PRESSURE: 56 MMHG | SYSTOLIC BLOOD PRESSURE: 108 MMHG | WEIGHT: 128.97 LBS | TEMPERATURE: 98 F | OXYGEN SATURATION: 100 % | RESPIRATION RATE: 16 BRPM | HEIGHT: 69 IN

## 2021-01-29 DIAGNOSIS — Z95.1 PRESENCE OF AORTOCORONARY BYPASS GRAFT: Chronic | ICD-10-CM

## 2021-01-29 DIAGNOSIS — N18.6 END STAGE RENAL DISEASE: ICD-10-CM

## 2021-01-29 DIAGNOSIS — Z90.49 ACQUIRED ABSENCE OF OTHER SPECIFIED PARTS OF DIGESTIVE TRACT: Chronic | ICD-10-CM

## 2021-01-29 DIAGNOSIS — I77.9 DISORDER OF ARTERIES AND ARTERIOLES, UNSPECIFIED: Chronic | ICD-10-CM

## 2021-01-29 LAB
ALBUMIN SERPL ELPH-MCNC: 3.1 G/DL — LOW (ref 3.3–5.2)
ALP SERPL-CCNC: 63 U/L — SIGNIFICANT CHANGE UP (ref 40–120)
ALT FLD-CCNC: 13 U/L — SIGNIFICANT CHANGE UP
ANION GAP SERPL CALC-SCNC: 15 MMOL/L — SIGNIFICANT CHANGE UP (ref 5–17)
APTT BLD: 32.8 SEC — SIGNIFICANT CHANGE UP (ref 27.5–35.5)
APTT BLD: 36.1 SEC — HIGH (ref 27.5–35.5)
AST SERPL-CCNC: 9 U/L — SIGNIFICANT CHANGE UP
BASOPHILS # BLD AUTO: 0.04 K/UL — SIGNIFICANT CHANGE UP (ref 0–0.2)
BASOPHILS NFR BLD AUTO: 0.7 % — SIGNIFICANT CHANGE UP (ref 0–2)
BILIRUB SERPL-MCNC: 0.5 MG/DL — SIGNIFICANT CHANGE UP (ref 0.4–2)
BUN SERPL-MCNC: 41 MG/DL — HIGH (ref 8–20)
CALCIUM SERPL-MCNC: 8.5 MG/DL — LOW (ref 8.6–10.2)
CHLORIDE SERPL-SCNC: 103 MMOL/L — SIGNIFICANT CHANGE UP (ref 98–107)
CO2 SERPL-SCNC: 25 MMOL/L — SIGNIFICANT CHANGE UP (ref 22–29)
CREAT SERPL-MCNC: 4.49 MG/DL — HIGH (ref 0.5–1.3)
EOSINOPHIL # BLD AUTO: 0.33 K/UL — SIGNIFICANT CHANGE UP (ref 0–0.5)
EOSINOPHIL NFR BLD AUTO: 6 % — SIGNIFICANT CHANGE UP (ref 0–6)
GLUCOSE SERPL-MCNC: 87 MG/DL — SIGNIFICANT CHANGE UP (ref 70–99)
HCT VFR BLD CALC: 23.5 % — LOW (ref 39–50)
HGB BLD-MCNC: 7.2 G/DL — LOW (ref 13–17)
IMM GRANULOCYTES NFR BLD AUTO: 0.2 % — SIGNIFICANT CHANGE UP (ref 0–1.5)
INR BLD: 1.84 RATIO — HIGH (ref 0.88–1.16)
INR BLD: 1.91 RATIO — HIGH (ref 0.88–1.16)
LACTATE SERPL-SCNC: 1.5 MMOL/L — SIGNIFICANT CHANGE UP (ref 0.5–2)
LYMPHOCYTES # BLD AUTO: 1.82 K/UL — SIGNIFICANT CHANGE UP (ref 1–3.3)
LYMPHOCYTES # BLD AUTO: 33 % — SIGNIFICANT CHANGE UP (ref 13–44)
MAGNESIUM SERPL-MCNC: 1.7 MG/DL — LOW (ref 1.8–2.6)
MCHC RBC-ENTMCNC: 29.9 PG — SIGNIFICANT CHANGE UP (ref 27–34)
MCHC RBC-ENTMCNC: 30.6 GM/DL — LOW (ref 32–36)
MCV RBC AUTO: 97.5 FL — SIGNIFICANT CHANGE UP (ref 80–100)
MONOCYTES # BLD AUTO: 0.46 K/UL — SIGNIFICANT CHANGE UP (ref 0–0.9)
MONOCYTES NFR BLD AUTO: 8.3 % — SIGNIFICANT CHANGE UP (ref 2–14)
NEUTROPHILS # BLD AUTO: 2.85 K/UL — SIGNIFICANT CHANGE UP (ref 1.8–7.4)
NEUTROPHILS NFR BLD AUTO: 51.8 % — SIGNIFICANT CHANGE UP (ref 43–77)
PHOSPHATE SERPL-MCNC: 5 MG/DL — HIGH (ref 2.4–4.7)
PLATELET # BLD AUTO: 145 K/UL — LOW (ref 150–400)
POTASSIUM SERPL-MCNC: 3.6 MMOL/L — SIGNIFICANT CHANGE UP (ref 3.5–5.3)
POTASSIUM SERPL-SCNC: 3.6 MMOL/L — SIGNIFICANT CHANGE UP (ref 3.5–5.3)
PROT SERPL-MCNC: 6.1 G/DL — LOW (ref 6.6–8.7)
PROTHROM AB SERPL-ACNC: 20.7 SEC — HIGH (ref 10.6–13.6)
PROTHROM AB SERPL-ACNC: 21.5 SEC — HIGH (ref 10.6–13.6)
RBC # BLD: 2.41 M/UL — LOW (ref 4.2–5.8)
RBC # FLD: 15.1 % — HIGH (ref 10.3–14.5)
SODIUM SERPL-SCNC: 143 MMOL/L — SIGNIFICANT CHANGE UP (ref 135–145)
TROPONIN T SERPL-MCNC: 0.07 NG/ML — HIGH (ref 0–0.06)
WBC # BLD: 5.51 K/UL — SIGNIFICANT CHANGE UP (ref 3.8–10.5)
WBC # FLD AUTO: 5.51 K/UL — SIGNIFICANT CHANGE UP (ref 3.8–10.5)

## 2021-01-29 PROCEDURE — 36830 ARTERY-VEIN NONAUTOGRAFT: CPT

## 2021-01-29 PROCEDURE — 93010 ELECTROCARDIOGRAM REPORT: CPT

## 2021-01-29 RX ORDER — ASCORBIC ACID 60 MG
500 TABLET,CHEWABLE ORAL DAILY
Refills: 0 | Status: DISCONTINUED | OUTPATIENT
Start: 2021-01-29 | End: 2021-01-30

## 2021-01-29 RX ORDER — DESMOPRESSIN ACETATE 0.1 MG/1
20 TABLET ORAL ONCE
Refills: 0 | Status: DISCONTINUED | OUTPATIENT
Start: 2021-01-29 | End: 2021-01-29

## 2021-01-29 RX ORDER — METOPROLOL TARTRATE 50 MG
25 TABLET ORAL
Refills: 0 | Status: DISCONTINUED | OUTPATIENT
Start: 2021-01-30 | End: 2021-01-30

## 2021-01-29 RX ORDER — SODIUM CHLORIDE 9 MG/ML
500 INJECTION, SOLUTION INTRAVENOUS ONCE
Refills: 0 | Status: COMPLETED | OUTPATIENT
Start: 2021-01-29 | End: 2021-01-29

## 2021-01-29 RX ORDER — LEVETIRACETAM 250 MG/1
750 TABLET, FILM COATED ORAL EVERY 12 HOURS
Refills: 0 | Status: DISCONTINUED | OUTPATIENT
Start: 2021-01-29 | End: 2021-01-30

## 2021-01-29 RX ORDER — TRAMADOL HYDROCHLORIDE 50 MG/1
1 TABLET ORAL
Qty: 16 | Refills: 0
Start: 2021-01-29 | End: 2021-02-01

## 2021-01-29 RX ORDER — ATORVASTATIN CALCIUM 80 MG/1
40 TABLET, FILM COATED ORAL AT BEDTIME
Refills: 0 | Status: DISCONTINUED | OUTPATIENT
Start: 2021-01-29 | End: 2021-01-30

## 2021-01-29 RX ORDER — SEVELAMER CARBONATE 2400 MG/1
800 POWDER, FOR SUSPENSION ORAL
Refills: 0 | Status: DISCONTINUED | OUTPATIENT
Start: 2021-01-29 | End: 2021-01-30

## 2021-01-29 RX ORDER — OXYCODONE HYDROCHLORIDE 5 MG/1
5 TABLET ORAL ONCE
Refills: 0 | Status: DISCONTINUED | OUTPATIENT
Start: 2021-01-29 | End: 2021-01-29

## 2021-01-29 RX ORDER — DESMOPRESSIN ACETATE 0.1 MG/1
20 TABLET ORAL ONCE
Refills: 0 | Status: COMPLETED | OUTPATIENT
Start: 2021-01-29 | End: 2021-01-29

## 2021-01-29 RX ORDER — GABAPENTIN 400 MG/1
100 CAPSULE ORAL AT BEDTIME
Refills: 0 | Status: DISCONTINUED | OUTPATIENT
Start: 2021-01-29 | End: 2021-01-30

## 2021-01-29 RX ORDER — PANTOPRAZOLE SODIUM 20 MG/1
40 TABLET, DELAYED RELEASE ORAL
Refills: 0 | Status: DISCONTINUED | OUTPATIENT
Start: 2021-01-29 | End: 2021-01-30

## 2021-01-29 RX ORDER — FENTANYL CITRATE 50 UG/ML
50 INJECTION INTRAVENOUS
Refills: 0 | Status: DISCONTINUED | OUTPATIENT
Start: 2021-01-29 | End: 2021-01-29

## 2021-01-29 RX ORDER — FENTANYL CITRATE 50 UG/ML
25 INJECTION INTRAVENOUS
Refills: 0 | Status: DISCONTINUED | OUTPATIENT
Start: 2021-01-29 | End: 2021-01-29

## 2021-01-29 RX ORDER — ERYTHROPOIETIN 10000 [IU]/ML
10000 INJECTION, SOLUTION INTRAVENOUS; SUBCUTANEOUS
Refills: 0 | Status: DISCONTINUED | OUTPATIENT
Start: 2021-01-29 | End: 2021-01-30

## 2021-01-29 RX ADMIN — DESMOPRESSIN ACETATE 220 MICROGRAM(S): 0.1 TABLET ORAL at 16:15

## 2021-01-29 RX ADMIN — Medication 100 MILLIGRAM(S): at 12:57

## 2021-01-29 RX ADMIN — LEVETIRACETAM 400 MILLIGRAM(S): 250 TABLET, FILM COATED ORAL at 21:12

## 2021-01-29 RX ADMIN — GABAPENTIN 100 MILLIGRAM(S): 400 CAPSULE ORAL at 22:11

## 2021-01-29 RX ADMIN — SEVELAMER CARBONATE 800 MILLIGRAM(S): 2400 POWDER, FOR SUSPENSION ORAL at 22:12

## 2021-01-29 RX ADMIN — ATORVASTATIN CALCIUM 40 MILLIGRAM(S): 80 TABLET, FILM COATED ORAL at 22:12

## 2021-01-29 RX ADMIN — SODIUM CHLORIDE 1000 MILLILITER(S): 9 INJECTION, SOLUTION INTRAVENOUS at 15:30

## 2021-01-29 NOTE — BRIEF OPERATIVE NOTE - NSICDXBRIEFPROCEDURE_GEN_ALL_CORE_FT
PROCEDURES:  Creation, AV fistula, loop, using [place]-Ravi graft 29-Jan-2021 19:28:20 Denny Manley

## 2021-01-29 NOTE — ASU DISCHARGE PLAN (ADULT/PEDIATRIC) - CARE PROVIDER_API CALL
Scotty Argueta)  Surgery  [address], [address] [place] [address]  Phone: [phone]  Fax: [phone]  Follow Up Time: 1 week

## 2021-01-29 NOTE — CHART NOTE - NSCHARTNOTEFT_GEN_A_CORE
Pt reassessed. Found to have persistent hypotension and remains asx. Denies n/v/f/SOB/dizziness/palpitations/CP. Still saying " I feel ready to go home."    On PE palpable thrill diminished compared to prior examination. Radial pulse palpable.     Another 500 cc bolus ordered and stat labs ordered. Will follow-up. Attending aware. Pt reassessed. Found to have persistent hypotension and remains asx. Denies n/v/f/SOB/dizziness/palpitations/CP. Still saying " I feel ready to go home."    On PE LUE with continued swelling along dressing/incision site. Radial pulse palpable.     Another 500 cc bolus ordered and stat labs ordered. Will follow-up. Attending aware.

## 2021-01-29 NOTE — CHART NOTE - NSCHARTNOTEFT_GEN_A_CORE
Called by RN regarding pt hypotensive to mid 80s systolic, asx. Evaluated patient at bedside who reports no sx. Denies n/v/f/SOB/CP/dizziness/tenderness to palpation in the LUE. "I feel ready to go home." Pt took am metoprolol this am, cannot remember the dose. Pt received 150mls fluid in the OR during case, had dialysis yesterday (HD TTS). Pt was 110s systolic pre-operatively.    on PE, LUE is swollen along dressings. Dressings are c/d/i. AVG thrill is palpable. radial pulse palpable.     500 cc bolus ordered. Will follow-up response to more fluid. Called by RN regarding pt hypotensive to mid 80s systolic, asx. Evaluated patient at bedside who reports no sx. Denies n/v/f/SOB/CP/dizziness/tenderness to palpation in the LUE. "I feel ready to go home." Pt took am metoprolol this am, cannot remember the dose. Pt received 150mls fluid in the OR during case, had dialysis yesterday (HD TTS). Pt was 110s systolic pre-operatively.    on PE, LUE is swollen along dressings. Dressings are c/d/i. AVG thrill is palpable. radial pulse palpable.     500 cc bolus ordered. Will follow-up response to more fluid. Attending aware.

## 2021-01-30 ENCOUNTER — TRANSCRIPTION ENCOUNTER (OUTPATIENT)
Age: 61
End: 2021-01-30

## 2021-01-30 VITALS
DIASTOLIC BLOOD PRESSURE: 86 MMHG | HEART RATE: 72 BPM | RESPIRATION RATE: 18 BRPM | OXYGEN SATURATION: 96 % | SYSTOLIC BLOOD PRESSURE: 136 MMHG | TEMPERATURE: 99 F

## 2021-01-30 DIAGNOSIS — K62.5 HEMORRHAGE OF ANUS AND RECTUM: ICD-10-CM

## 2021-01-30 DIAGNOSIS — R71.0 PRECIPITOUS DROP IN HEMATOCRIT: ICD-10-CM

## 2021-01-30 LAB
ANION GAP SERPL CALC-SCNC: 15 MMOL/L — SIGNIFICANT CHANGE UP (ref 5–17)
APTT BLD: 34.7 SEC — SIGNIFICANT CHANGE UP (ref 27.5–35.5)
BLD GP AB SCN SERPL QL: SIGNIFICANT CHANGE UP
BUN SERPL-MCNC: 50 MG/DL — HIGH (ref 8–20)
CALCIUM SERPL-MCNC: 8.5 MG/DL — LOW (ref 8.6–10.2)
CHLORIDE SERPL-SCNC: 102 MMOL/L — SIGNIFICANT CHANGE UP (ref 98–107)
CO2 SERPL-SCNC: 26 MMOL/L — SIGNIFICANT CHANGE UP (ref 22–29)
CREAT SERPL-MCNC: 4.46 MG/DL — HIGH (ref 0.5–1.3)
GLUCOSE SERPL-MCNC: 99 MG/DL — SIGNIFICANT CHANGE UP (ref 70–99)
INR BLD: 1.61 RATIO — HIGH (ref 0.88–1.16)
MAGNESIUM SERPL-MCNC: 1.7 MG/DL — SIGNIFICANT CHANGE UP (ref 1.6–2.6)
PHOSPHATE SERPL-MCNC: 5 MG/DL — HIGH (ref 2.4–4.7)
POTASSIUM SERPL-MCNC: 2.9 MMOL/L — CRITICAL LOW (ref 3.5–5.3)
POTASSIUM SERPL-SCNC: 2.9 MMOL/L — CRITICAL LOW (ref 3.5–5.3)
PROTHROM AB SERPL-ACNC: 18.3 SEC — HIGH (ref 10.6–13.6)
SODIUM SERPL-SCNC: 143 MMOL/L — SIGNIFICANT CHANGE UP (ref 135–145)

## 2021-01-30 PROCEDURE — 99222 1ST HOSP IP/OBS MODERATE 55: CPT

## 2021-01-30 PROCEDURE — 99223 1ST HOSP IP/OBS HIGH 75: CPT | Mod: GC,25

## 2021-01-30 PROCEDURE — 99223 1ST HOSP IP/OBS HIGH 75: CPT

## 2021-01-30 PROCEDURE — 90937 HEMODIALYSIS REPEATED EVAL: CPT

## 2021-01-30 RX ORDER — OXYCODONE AND ACETAMINOPHEN 5; 325 MG/1; MG/1
1 TABLET ORAL EVERY 4 HOURS
Refills: 0 | Status: DISCONTINUED | OUTPATIENT
Start: 2021-01-30 | End: 2021-01-30

## 2021-01-30 RX ORDER — TRAMADOL HYDROCHLORIDE 50 MG/1
1 TABLET ORAL
Qty: 16 | Refills: 0
Start: 2021-01-30 | End: 2021-02-02

## 2021-01-30 RX ORDER — HYDROCORTISONE/PRAMOXINE 2.5 %-1 %
1 CREAM WITH APPLICATOR RECTAL
Qty: 30 | Refills: 0
Start: 2021-01-30

## 2021-01-30 RX ORDER — POTASSIUM CHLORIDE 20 MEQ
20 PACKET (EA) ORAL ONCE
Refills: 0 | Status: COMPLETED | OUTPATIENT
Start: 2021-01-30 | End: 2021-01-30

## 2021-01-30 RX ADMIN — ERYTHROPOIETIN 10000 UNIT(S): 10000 INJECTION, SOLUTION INTRAVENOUS; SUBCUTANEOUS at 14:02

## 2021-01-30 RX ADMIN — Medication 20 MILLIEQUIVALENT(S): at 17:07

## 2021-01-30 RX ADMIN — Medication 25 MILLIGRAM(S): at 17:07

## 2021-01-30 RX ADMIN — LEVETIRACETAM 400 MILLIGRAM(S): 250 TABLET, FILM COATED ORAL at 08:33

## 2021-01-30 RX ADMIN — SEVELAMER CARBONATE 800 MILLIGRAM(S): 2400 POWDER, FOR SUSPENSION ORAL at 01:58

## 2021-01-30 RX ADMIN — SEVELAMER CARBONATE 800 MILLIGRAM(S): 2400 POWDER, FOR SUSPENSION ORAL at 08:33

## 2021-01-30 RX ADMIN — PANTOPRAZOLE SODIUM 40 MILLIGRAM(S): 20 TABLET, DELAYED RELEASE ORAL at 05:01

## 2021-01-30 RX ADMIN — OXYCODONE AND ACETAMINOPHEN 1 TABLET(S): 5; 325 TABLET ORAL at 15:26

## 2021-01-30 NOTE — DISCHARGE NOTE PROVIDER - NSDCFUSCHEDAPPT_GEN_ALL_CORE_FT
JONATHAN GIBSON ; 02/05/2021 ; [facility] [address] ; 02/23/2021 ; [facility] [address]

## 2021-01-30 NOTE — DISCHARGE NOTE PROVIDER - NSDCCPCAREPLAN_GEN_ALL_CORE_FT
PRINCIPAL DISCHARGE DIAGNOSIS  Diagnosis: ESRD on dialysis  Assessment and Plan of Treatment:       SECONDARY DISCHARGE DIAGNOSES  Diagnosis: Cerebrovascular accident (CVA)  Assessment and Plan of Treatment:     Diagnosis: Atrial flutter  Assessment and Plan of Treatment:     Diagnosis: CAD (coronary atherosclerotic disease)  Assessment and Plan of Treatment:     Diagnosis: Acute anemia  Assessment and Plan of Treatment:

## 2021-01-30 NOTE — DISCHARGE NOTE PROVIDER - CARE PROVIDERS DIRECT ADDRESSES
[email or web address]

## 2021-01-30 NOTE — CONSULT NOTE ADULT - ASSESSMENT
1) ESRD on HD  2) MBD of renal dx  3) Anemia of renal dx  4) Vol HTN    Pt reevaluated on HD machine;  Tolerating  PRBC x 1 yesterday post op  Again PRBC x 1 today on HD  Will be seeing GI outpatient for colonoscopy for dark stools;  Will f/u outpatient at [facility] Dr Argueta

## 2021-01-30 NOTE — DISCHARGE NOTE PROVIDER - NSDCMRMEDTOKEN_GEN_ALL_CORE_FT
acetaminophen 325 mg oral tablet: 2 tab(s) orally every 6 hours, As needed, Temp greater or equal to 38C (100.4F), Mild Pain (1 - 3)  ascorbic acid 500 mg oral tablet: 1 tab(s) orally once a day  aspirin 81 mg oral delayed release tablet: 1 tab(s) orally once a day  atorvastatin 40 mg oral tablet: 1 tab(s) orally once a day (at bedtime)  Coumadin 4 mg oral tablet: 1 tab(s) orally once a day  gabapentin 100 mg oral capsule: 1 cap(s) orally once a day (at bedtime)  levETIRAcetam 750 mg oral tablet: 1 tab(s) orally 2 times a day  metoprolol tartrate 25 mg oral tablet: 1 tab(s) orally every 12 hours  pantoprazole 40 mg oral delayed release tablet: 1 tab(s) orally 2 times a day  sevelamer hydrochloride 800 mg oral tablet: 1 tab(s) orally 4 times a day  traMADol 50 mg oral tablet: 1 tab(s) orally every 6 hours, As Needed MDD:4 tabs   acetaminophen 325 mg oral tablet: 2 tab(s) orally every 6 hours, As needed, Temp greater or equal to 38C (100.4F), Mild Pain (1 - 3)  ascorbic acid 500 mg oral tablet: 1 tab(s) orally once a day  aspirin 81 mg oral delayed release tablet: 1 tab(s) orally once a day  atorvastatin 40 mg oral tablet: 1 tab(s) orally once a day (at bedtime)  Coumadin 4 mg oral tablet: 1 tab(s) orally once a day  gabapentin 100 mg oral capsule: 1 cap(s) orally once a day (at bedtime)  hydrocortisone-pramoxine 2.5%-1% rectal cream: 1 applicatorful rectally once a day, As Needed  for rectal bleeding   levETIRAcetam 750 mg oral tablet: 1 tab(s) orally 2 times a day  metoprolol tartrate 25 mg oral tablet: 1 tab(s) orally every 12 hours  pantoprazole 40 mg oral delayed release tablet: 1 tab(s) orally 2 times a day  sevelamer hydrochloride 800 mg oral tablet: 1 tab(s) orally 4 times a day  traMADol 50 mg oral tablet: 1 tab(s) orally every 6 hours, As Needed -for moderate pain MDD:4 tabs

## 2021-01-30 NOTE — DISCHARGE NOTE PROVIDER - HOSPITAL COURSE
60 year old male poor historian with PMH ESRD on HD, Seizure, HLD, A-fib, CHF, CVA, Covid 19, former cocaine abuse and HTN, PSH mitral and aortic valve replacement and double bypass (November 2020) present today for PST. Patient c/o need for AV fistula. Patient states right IJ tunneled catheter was placed in November 2020 to start HD. Patient goes to HD 3x a week on Tues/Thurs/Sat. Presented for AVF creation and was taken to the OR by Dr Argueta on 1/29/21 and underwent L AVG. Intraop course uncomplicated.  Post-op was hypotensive as low as 70's systolic, originally given 1 liter bolus of IV fluids and pushes of nishi with no resolution. Post-op labs ordered and showed drop in hemoglobin to 7.2 from 9, ekg wnl, troponin wnl. Patient given a 1 unit packed red cells and responded well, during this time patient remained neurologically intact. He was monitored overnight. Parenthetically, the pt had reported that he had had a bloody BM that morning and has had them on occasion. Overnight, BP improved and remained alert. HD scheduled for POD #1 with additional transfusion of PRBC's. Cardiology consulted sec to complex cardiac history although pt denied any CP. GI was also consulted for BRBPR and....................... He remained hemodynamically stable with soft post op hematoma to LUE and no new deficits. He is ambulating independently and tolerating a diet. He is stable for discharge home   60 year old male poor historian with PMH ESRD on HD, Seizure, HLD, A-fib, CHF, CVA, Covid 19, former cocaine abuse and HTN, PSH mitral and aortic valve replacement and double bypass (November 2020) present today for PST. Patient c/o need for AV fistula. Patient states right IJ tunneled catheter was placed in November 2020 to start HD. Patient goes to HD 3x a week on Tues/Thurs/Sat. Presented for AVF creation and was taken to the OR by Dr Argueta on 1/29/21 and underwent L AVG. Intraop course uncomplicated.  Post-op was hypotensive as low as 70's systolic, originally given 1 liter bolus of IV fluids and pushes of nishi with no resolution. Post-op labs ordered and showed drop in hemoglobin to 7.2 from 9, ekg wnl, troponin wnl. Patient given a 1 unit packed red cells and responded well, during this time patient remained neurologically intact. He was monitored overnight. Parenthetically, the pt had reported that he had had a bloody BM that morning and has had them on occasion. Overnight, BP improved and remained alert. HD scheduled for POD #1 with additional transfusion of PRBC's. Cardiology consulted sec to complex cardiac history although pt denied any CP. GI was also consulted for BRBPR and recommended colonoscopy however pt wishes to do as outpt. He was cleared to resume coumadin and follow up with GI as outpt. He remained hemodynamically stable with soft post op hematoma to LUE and no new deficits. He is ambulating independently and tolerating a diet. He is stable for discharge home

## 2021-01-30 NOTE — CONSULT NOTE ADULT - SUBJECTIVE AND OBJECTIVE BOX
Patient is a 60y old  Male who presents with a chief complaint of ESRD (30 Jan 2021 11:21)      HPI: Patient with hx of  ESRD ( dialysis, tues, thurs, sat) , afib, CVA, MVR and AVRm recent covid . Colon resection( state he had AAA repair- there was wound dihescence  and pt then required colon resection in 2010. last colon with Dr Feldman was 2012- no polyps as per pt      Patient had AV graft done yesterday. Was hypotensive after and had one bloody BM. Today BM normal. AS pt  per patient  He went to rehab after cardiac sx last fall. He began having intermittent rectal bleeding. Would occur only with BM. Sometimes  "alot "of blood, sometimes just blood with wiping. No abdominal pain, no weight loss , no change in bowel habits. No nausea, no vomiting.  Pt states he feels he had rectal bleeding when his coumadin levels were high in the past. INR is now 1.6. No family hx of colon cancer/polyps      REVIEW OF SYSTEMS:  Constitutional: No fever, weight loss or fatigue  ENMT:  No difficulty hearing, tinnitus, vertigo; No sinus or throat pain  Respiratory: No cough, wheezing, chills or hemoptysis  Cardiovascular: No chest pain, palpitations, dizziness or leg swelling  Gastrointestinal: No abdominal or epigastric pain. No nausea, vomiting or hematemesis; No diarrhea or constipation. No melena +  hematochezia.  Skin: No itching, burning, rashes or lesions   Musculoskeletal: No joint pain or swelling; No muscle, back or extremity pain    PAST MEDICAL & SURGICAL HISTORY:  History of cocaine use  quit &quot;many years ago&quot;    Former smoker    ESRD on dialysis    Atrial fibrillation    COVID-19  october 2020    Hyperlipemia    HTN (hypertension)    Seizures  last seizure 10 years ago    CVA (cerebral vascular accident)    CHF (congestive heart failure)    Status post double vessel coronary artery bypass    H/O colectomy    Aorta disorder        FAMILY HISTORY:  Family history of cardiac disorder  mother    FH: hypertension        SOCIAL HISTORY:  Smoking Status: [ ] Current, [ ] Former, [ ] Never  Pack Years:  [  ] EtOH-no  [  ] IVDA    MEDICATIONS:  MEDICATIONS  (STANDING):  ascorbic acid 500 milliGRAM(s) Oral daily  atorvastatin 40 milliGRAM(s) Oral at bedtime  epoetin yolanda-epbx (RETACRIT) Injectable 33275 Unit(s) IV Push <User Schedule>  gabapentin 100 milliGRAM(s) Oral at bedtime  levETIRAcetam  IVPB 750 milliGRAM(s) IV Intermittent every 12 hours  metoprolol tartrate 25 milliGRAM(s) Oral two times a day  pantoprazole    Tablet 40 milliGRAM(s) Oral before breakfast  potassium chloride    Tablet ER 20 milliEquivalent(s) Oral once  sevelamer carbonate 800 milliGRAM(s) Oral <User Schedule>    MEDICATIONS  (PRN):      Allergies    penicillin (Unknown)    Intolerances        Vital Signs Last 24 Hrs  T(C): 37.1 (30 Jan 2021 11:23), Max: 37.1 (30 Jan 2021 11:23)  T(F): 98.7 (30 Jan 2021 11:23), Max: 98.7 (30 Jan 2021 11:23)  HR: 82 (30 Jan 2021 11:23) (55 - 82)  BP: 149/79 (30 Jan 2021 11:23) (74/44 - 154/71)  BP(mean): 90 (29 Jan 2021 20:30) (90 - 98)  RR: 19 (30 Jan 2021 11:23) (10 - 21)  SpO2: 100% (30 Jan 2021 11:23) (97% - 100%)    01-29 @ 07:01  -  01-30 @ 07:00  --------------------------------------------------------  IN: 960 mL / OUT: 50 mL / NET: 910 mL          PHYSICAL EXAM:    General: Overweight    well nourished; in no acute distress  HEENT: MMM, conjunctiva and sclera clear  H- RRR  L- CTA  Gastrointestinal: Soft, non-tender non-distended; Normal bowel sounds; No rebound or guarding  Extremities: Normal range of motion, No clubbing, cyanosis or edema  Neurological: Alert and oriented x3  Skin: Warm and dry. No obvious rash  rectal- small internal hemorrhoids, brown stool today      LABS:                        7.2    5.51  )-----------( 145      ( 29 Jan 2021 17:18 )             23.5     30 Jan 2021 06:44    143    |  102    |  50.0   ----------------------------<  99     2.9     |  26.0   |  4.46     Ca    8.5        30 Jan 2021 06:44  Phos  5.0       30 Jan 2021 06:44  Mg     1.7       30 Jan 2021 06:44    TPro  6.1    /  Alb  3.1    /  TBili  0.5    /  DBili  x      /  AST  9      /  ALT  13     /  AlkPhos  63     / Amylase x      /Lipase x      29 Jan 2021 17:18              RADIOLOGY & ADDITIONAL STUDIES:     < from: Xray Chest 2 Views PA/Lat (01.25.21 @ 14:32) >  MPRESSION:  Subsegmental atelectasis in the LEFT lower lobe.    < end of copied text >

## 2021-01-30 NOTE — DISCHARGE NOTE PROVIDER - PROVIDER TOKENS
PROVIDER:[TOKEN:[[id number]],FOLLOWUP:[2 weeks]] PROVIDER:[TOKEN:[[id number]],FOLLOWUP:[2 weeks]],PROVIDER:[TOKEN:[[id number]],FOLLOWUP:[2 weeks]]

## 2021-01-30 NOTE — DISCHARGE NOTE PROVIDER - NSDCCPTREATMENT_GEN_ALL_CORE_FT
PRINCIPAL PROCEDURE  Procedure: Creation, AV fistula, loop, using [place]-Ravi graft  Findings and Treatment: Forearm

## 2021-01-30 NOTE — CONSULT NOTE ADULT - SUBJECTIVE AND OBJECTIVE BOX
[facility] DIVISION OF KIDNEY DISEASES AND HYPERTENSION -- INITIAL CONSULT NOTE  --------------------------------------------------------------------------------  HPI:  60yr old ESRD ( dialysis, tues, thurs, sat) , afib, CVA, MVR and AVRm recent covid . Colon resection( state he had AAA repair- there was wound dihescence  and pt then required colon resection in 2010. last colon with Dr Feldman was 2012- no polyps as per pt Patient had AV graft done yesterday. Was hypotensive after and had one bloody BM. Today BM normal. AS pt  per patient  He went to rehab after cardiac sx last fall. He began having intermittent rectal bleeding. Would occur only with BM. Sometimes  "alot "of blood, sometimes just blood with wiping. No abdominal pain, no weight loss , no change in bowel habits. No nausea, no vomiting.  Pt states he feels he had rectal bleeding when his coumadin levels were high in the past. INR is now 1.6. No family hx of colon cancer/polyps    Pt seen/examined; reevaluated on HD machine. Well known to me from [facility]; is my outpt HD patient;       PAST HISTORY  --------------------------------------------------------------------------------  PAST MEDICAL & SURGICAL HISTORY:  History of cocaine use  quit &quot;many years ago&quot;    Former smoker    ESRD on dialysis    Atrial fibrillation    COVID-19 october 2020    Hyperlipemia    HTN (hypertension)    Seizures  last seizure 10 years ago    CVA (cerebral vascular accident)    CHF (congestive heart failure)    Status post double vessel coronary artery bypass    H/O colectomy    Aorta disorder      FAMILY HISTORY:  Family history of cardiac disorder  mother    FH: hypertension      PAST SOCIAL HISTORY:    ALLERGIES & MEDICATIONS  --------------------------------------------------------------------------------  Allergies    penicillin (Unknown)    Intolerances      Standing Inpatient Medications  ascorbic acid 500 milliGRAM(s) Oral daily  atorvastatin 40 milliGRAM(s) Oral at bedtime  epoetin yolanda-epbx (RETACRIT) Injectable 33762 Unit(s) IV Push <User Schedule>  gabapentin 100 milliGRAM(s) Oral at bedtime  levETIRAcetam  IVPB 750 milliGRAM(s) IV Intermittent every 12 hours  metoprolol tartrate 25 milliGRAM(s) Oral two times a day  pantoprazole    Tablet 40 milliGRAM(s) Oral before breakfast  potassium chloride    Tablet ER 20 milliEquivalent(s) Oral once  sevelamer carbonate 800 milliGRAM(s) Oral <User Schedule>    PRN Inpatient Medications      REVIEW OF SYSTEMS  --------------------------------------------------------------------------------  Gen: No weight changes, fatigue, fevers/chills, weakness  Skin: No rashes  Head/Eyes/Ears/Mouth: No headache; Normal hearing; Normal vision w/o blurriness; No sinus pain/discomfort, sore throat  Respiratory: No dyspnea, cough, wheezing, hemoptysis  CV: No chest pain, PND, orthopnea  GI: No abdominal pain, diarrhea, constipation, nausea, vomiting, melena, hematochezia  : No increased frequency, dysuria, hematuria, nocturia  MSK: No joint pain/swelling; no back pain; no edema  Neuro: No dizziness/lightheadedness, weakness, seizures, numbness, tingling  Heme: No easy bruising or bleeding  Endo: No heat/cold intolerance  Psych: No significant nervousness, anxiety, stress, depression    All other systems were reviewed and are negative, except as noted.    VITALS/PHYSICAL EXAM  --------------------------------------------------------------------------------  T(C): 37.1 (01-30-21 @ 11:23), Max: 37.1 (01-30-21 @ 11:23)  HR: 82 (01-30-21 @ 11:23) (55 - 82)  BP: 149/79 (01-30-21 @ 11:23) (74/44 - 154/71)  RR: 19 (01-30-21 @ 11:23) (10 - 21)  SpO2: 100% (01-30-21 @ 11:23) (97% - 100%)  Wt(kg): --  Height (cm): 175.3 (01-29-21 @ 10:20)  Weight (kg): 58.5 (01-29-21 @ 10:20)  BMI (kg/m2): 19 (01-29-21 @ 10:20)  BSA (m2): 1.71 (01-29-21 @ 10:20)      01-29-21 @ 07:01  -  01-30-21 @ 07:00  --------------------------------------------------------  IN: 960 mL / OUT: 50 mL / NET: 910 mL      Physical Exam:  	Gen: NAD, well-appearing  	HEENT: PERRL, supple neck, clear oropharynx  	Pulm: CTA B/L  	CV: RRR, S1S2; no rub  	Back: No spinal or CVA tenderness; no sacral edema  	Abd: +BS, soft, nontender/nondistended  	: No suprapubic tenderness  	UE: Warm, FROM, no clubbing, intact strength; no edema; no asterixis  	LE: Warm, FROM, no clubbing, intact strength; no edema  	Neuro: No focal deficits, intact gait  	Psych: Normal affect and mood  	Skin: Warm, without rashes  	Vascular access: L AVG +BT    LABS/STUDIES  --------------------------------------------------------------------------------              7.2    5.51  >-----------<  145      [01-29-21 @ 17:18]              23.5     143  |  102  |  50.0  ----------------------------<  99      [01-30-21 @ 06:44]  2.9   |  26.0  |  4.46        Ca     8.5     [01-30-21 @ 06:44]      Mg     1.7     [01-30-21 @ 06:44]      Phos  5.0     [01-30-21 @ 06:44]    TPro  6.1  /  Alb  3.1  /  TBili  0.5  /  DBili  x   /  AST  9   /  ALT  13  /  AlkPhos  63  [01-29-21 @ 17:18]    PT/INR: PT 18.3 , INR 1.61       [01-30-21 @ 06:44]  PTT: 34.7       [01-30-21 @ 06:44]    Troponin 0.07      [01-29-21 @ 17:18]    Creatinine Trend:  SCr 4.46 [01-30 @ 06:44]  SCr 4.49 [01-29 @ 17:18]  SCr 4.77 [01-25 @ 17:14]        Iron 29, TIBC 227, %sat 13      [12-05-20 @ 08:05]  Ferritin 1099      [12-05-20 @ 08:05]  TSH 1.95      [11-03-20 @ 08:18]  Lipid: chol --, , HDL --, LDL --      [11-15-20 @ 02:40]    HBsAb 6.5      [12-07-20 @ 14:20]  HBsAg Nonreact      [12-07-20 @ 14:20]  HBcAb Nonreact      [11-07-20 @ 05:41]  HCV 0.10, Nonreact      [12-07-20 @ 14:20]    SPEP Interpretation: Normal Electrophoresis Pattern      [12-05-20 @ 08:05]

## 2021-01-30 NOTE — CONSULT NOTE ADULT - SUBJECTIVE AND OBJECTIVE BOX
[place] [facility]                                                               Office:  [address] [address]                                                              Telephone: [phone]. Fax:[phone]                                                                        CARDIOLOGY CONSULTATION NOTE                                                                                             Consult requested by:  Dr Argueta  Reason for Consultation: hypotension  History obtained by: Patient and medical record   obtained: No    Chief complaint:  60yMale   Patient is a 60y old  Male who presents with a chief complaint of AVG (30 Jan 2021 12:26)      HPI:  60M hx asc AO repair, CABG, AVR, MVR, AF on Coumadin, ESRD presented to [facility] electively for AVF placement.  Had procedure on 1/29, where post-operatively he was noted to be hypotensive with bleeding.  His hemoglobin at the time was noted to be decreased from baseline.  He was resuscitated with fluids and 1 unit of PRBCs.  His BP has normalized since that time.  During the entirety of the episode he reports no symptoms.  Denies chest pain/pressure, palpitations, irregular and/or rapid heart beat, SOB, ZUNIGA, syncope/near syncope, dizziness, orthopnea, PND, cough, edema, f/c, n/v/d, hematuria, or hematochezia when seen today.  Also notably, during the hypotensive episode, a troponin was checked and was mildly elevated.       REVIEW OF SYSTEMS:   a 12 point ROS was negative except as per the HPI above.     CONSTITUTIONAL: No fever, weight loss, or fatigue  ENMT:  No difficulty hearing, tinnitus, vertigo; No sinus or throat pain  NECK: No pain or stiffness  CARDIOVASCULAR: No chest pain, dyspnea, syncope, palpitations, dizziness, Orthopnea, Paroxsymal nocturnal dyspnea  RESPIRATORY: No Dyspnea on exertion, Shortness of breath, cough, wheezing  : No dysuria, no hematuria   GI: No dark color stool, no melena, no diarrhea, no constipation, no abdominal pain   NEURO: No headache, no dizziness, no slurred speech   MUSCULOSKELETAL: No joint pain or swelling; No muscle, back, or extremity pain  PSYCH: No agitation, no anxiety.    ALL OTHER REVIEW OF SYSTEMS ARE NEGATIVE.      PREVIOUS DIAGNOSTIC TESTING  ECHO FINDINGS:  < from: TTE Echo Complete w/o Contrast w/ Doppler (11.23.20 @ 13:54) >  Summary:   1. Left ventricular ejection fraction, by visual estimation, is 40%%.   2. Technically adequate study.   3. Moderately decreased global left ventricular systolic function.   4. Multiple left ventricular regional wall motion abnormalities exist. See wall motion findings.   5. The left ventricular diastolic function could not be assessed in this study.   6. Moderately reduced RV systolic function.   7. Trivial pericardial effusion.   8. A bioprosthetic valve is present in the mitral position.   9. Trace mitral valve regurgitation.  10. Moderate tricuspid regurgitation.  11. Bioprosthesis in the aortic position.  12. Estimated pulmonary artery systolic pressure is 40.9 mmHg assuming a right atrial pressure of 8 mmHg, which is consistent with mild pulmonary hypertension.  13. Endocardial visualization was enhanced with intravenous echo contrast.  14. Mitral valve mean gradient is 2.2 mmHg consistent with mild mitral stenosis.    MD Oriana Electronically signed on 11/23/2020 at 4:08:44 PM    < end of copied text >        ALLERGIES: Allergies    penicillin (Unknown)    Intolerances          PAST MEDICAL HISTORY  History of cocaine use    Former smoker    ESRD on dialysis    Atrial fibrillation    COVID-19    Hyperlipemia    HTN (hypertension)    Seizures    Chronic kidney disease    CVA (cerebral vascular accident)    CHF (congestive heart failure)        PAST SURGICAL HISTORY  Status post double vessel coronary artery bypass    H/O colectomy    Aorta disorder        FAMILY HISTORY:  Family history of cardiac disorder  mother    FH: hypertension        SOCIAL HISTORY:      DRUGS: former cocaine      CURRENT MEDICATIONS:  metoprolol tartrate 25 milliGRAM(s) Oral two times a day     gabapentin  levETIRAcetam  IVPB  pantoprazole    Tablet  ascorbic acid  atorvastatin  epoetin yolanda-epbx (RETACRIT) Injectable  potassium chloride    Tablet ER  sevelamer carbonate        HOME MEDICATIONS:  Home Medications:  acetaminophen 325 mg oral tablet: 2 tab(s) orally every 6 hours, As needed, Temp greater or equal to 38C (100.4F), Mild Pain (1 - 3) (29 Jan 2021 10:16)  ascorbic acid 500 mg oral tablet: 1 tab(s) orally once a day (29 Jan 2021 10:16)  Coumadin 4 mg oral tablet: 1 tab(s) orally once a day (29 Jan 2021 10:16)  sevelamer hydrochloride 800 mg oral tablet: 1 tab(s) orally 4 times a day (29 Jan 2021 10:16)      Vital Signs Last 24 Hrs  T(C): 37 (30 Jan 2021 15:52), Max: 37.1 (30 Jan 2021 11:23)  T(F): 98.6 (30 Jan 2021 15:52), Max: 98.7 (30 Jan 2021 11:23)  HR: 76 (30 Jan 2021 15:52) (55 - 83)  BP: 134/85 (30 Jan 2021 15:52) (80/38 - 154/82)  BP(mean): 90 (29 Jan 2021 20:30) (90 - 98)  RR: 19 (30 Jan 2021 15:10) (11 - 21)  SpO2: 91% (30 Jan 2021 15:52) (91% - 100%)      PHYSICAL EXAM:  Constitutional: Comfortable . No acute distress.   HEENT: Atraumatic and normocephalic , neck is supple . no JVD. No carotid bruit. PEERL   CNS: A&Ox3. No focal deficits. EOMI. Cranial nerves II-IX are intact.   Lymph Nodes: Cervical : Not palpable.  Respiratory: CTAB  Cardiovascular: irreg/irreg, 1/6 HSM  Gastrointestinal: Soft non-tender and non distended . +Bowel sounds. negative Garcia's sign.  Extremities: No edema.  AVF  Psychiatric: Calm . no agitation.  Skin: No skin rash/ulcers visualized to face, hands or feet.    Intake and output:   01-29 @ 07:01  -  01-30 @ 07:00  --------------------------------------------------------  IN: 960 mL / OUT: 50 mL / NET: 910 mL        LABS:                        7.2    5.51  )-----------( 145      ( 29 Jan 2021 17:18 )             23.5     01-30    143  |  102  |  50.0<H>  ----------------------------<  99  2.9<LL>   |  26.0  |  4.46<H>    Ca    8.5<L>      30 Jan 2021 06:44  Phos  5.0     01-30  Mg     1.7     01-30    TPro  6.1<L>  /  Alb  3.1<L>  /  TBili  0.5  /  DBili  x   /  AST  9   /  ALT  13  /  AlkPhos  63  01-29    CARDIAC MARKERS ( 29 Jan 2021 17:18 )  x     / 0.07 ng/mL / x     / x     / x        ;p-BNP=  PT/INR - ( 30 Jan 2021 06:44 )   PT: 18.3 sec;   INR: 1.61 ratio         PTT - ( 30 Jan 2021 06:44 )  PTT:34.7 sec    COVID-19 PCR: NotDetec (14 Dec 2020 05:15)  COVID-19 PCR: NotDetec (07 Dec 2020 05:20)  COVID-19 PCR: NotDetec (30 Nov 2020 20:35)  COVID-19 PCR: NotDetec (29 Nov 2020 17:37)  COVID-19 PCR: NotDetec (25 Nov 2020 03:21)  COVID-19 PCR: NotDetec (02 Nov 2020 09:52)      INTERPRETATION OF TELEMETRY: Reviewed by me.   ECG: Reviewed by me. AFl, LVH with repol abnormality    RADIOLOGY & ADDITIONAL STUDIES:    X-ray:  reviewed by me.

## 2021-01-30 NOTE — DISCHARGE NOTE PROVIDER - CARE PROVIDER_API CALL
Scotty Argueta)  Surgery  [address] [facility], [address]nd [place] [address]  Phone: [phone]  Fax: [phone]  Follow Up Time: 2 weeks   Scotty Argueta)  Surgery  [address] [facility], [address]nd [place] [address]  Phone: [phone]  Fax: [phone]  Follow Up Time: 2 weeks    Lacie Garzon (DO)  Gastroenterology  [address] [facility], [address]  [place] [address]  Phone: [phone]  Fax: [phone]  Follow Up Time: 2 weeks

## 2021-01-30 NOTE — DISCHARGE NOTE PROVIDER - NSDCFUADDINST_GEN_ALL_CORE_FT
May wash incision with soap and water and pat dry. Leave open to air. No ointments, powders or creams to incision. Monitor incision for any redness, swelling or drainage and call office immediately with any concerns  Continue to have coumadin monitored regularly and dosing per PMD.  Resume usual HD schedule via R chest catheter

## 2021-01-30 NOTE — PROVIDER CONTACT NOTE (CRITICAL VALUE NOTIFICATION) - NAME OF MD/NP/PA/DO NOTIFIED:
CTL progression of care: Met with pt and life partner Junior Duque. Pt resides in a 2 level home with 13 stairs to bedrooms with railings on both sides. Pt in independent with all ADL's including driving.       Pt currently attends cardiac rehab at [address] [place] [address] at [facility]
Vascular Team (Tez

## 2021-01-30 NOTE — DISCHARGE NOTE NURSING/CASE MANAGEMENT/SOCIAL WORK - PATIENT PORTAL LINK FT
You can access the FollowMyHealth Patient Portal offered by [facility] by registering at the following website: [email or web address]. By joining [employer]’s FollowMyHealth portal, you will also be able to view your health information using other applications (apps) compatible with our system.

## 2021-01-30 NOTE — PROGRESS NOTE ADULT - SUBJECTIVE AND OBJECTIVE BOX
Patient is a 60y old  Male who presents with a chief complaint of   Pt is S/P L AVG                        POD#1    Post op pt with hypotension and swelling LUE. Initially treated with fluids. Required DDAVP and transfusion.   This am feels well without c/o SOB or chest pain. Has had BRBPR on several occasions PTA as was awaiting GI evaluation. Last bloody BM 1/29 am prior to surgery. Pt reports multiple occasions and relates to elevated INR. Denies abd pain, N/V, diarrhea. No wt loss.     Vital Signs Last 24 Hrs  T(C): 36.6 (30 Jan 2021 07:39), Max: 36.7 (30 Jan 2021 05:00)  T(F): 97.9 (30 Jan 2021 07:39), Max: 98 (30 Jan 2021 05:00)  HR: 82 (30 Jan 2021 07:39) (55 - 82)  BP: 154/71 (30 Jan 2021 07:39) (74/44 - 154/71)  BP(mean): 90 (29 Jan 2021 20:30) (90 - 98)  RR: 18 (30 Jan 2021 07:39) (10 - 21)  SpO2: 98% (30 Jan 2021 07:39) (97% - 100%)  I&O's Detail    29 Jan 2021 07:01  -  30 Jan 2021 07:00  --------------------------------------------------------  IN:    Lactated Ringers Bolus: 650 mL    PRBCs (Packed Red Blood Cells): 310 mL  Total IN: 960 mL    OUT:    Estimated Blood Loss (mL): 50 mL  Total OUT: 50 mL    Total NET: 910 mL    MEDICATIONS  (STANDING):  ascorbic acid 500 milliGRAM(s) Oral daily  atorvastatin 40 milliGRAM(s) Oral at bedtime  epoetin yolanda-epbx (RETACRIT) Injectable 37660 Unit(s) IV Push <User Schedule>  gabapentin 100 milliGRAM(s) Oral at bedtime  levETIRAcetam  IVPB 750 milliGRAM(s) IV Intermittent every 12 hours  metoprolol tartrate 25 milliGRAM(s) Oral two times a day  pantoprazole    Tablet 40 milliGRAM(s) Oral before breakfast  potassium chloride    Tablet ER 20 milliEquivalent(s) Oral once  sevelamer carbonate 800 milliGRAM(s) Oral <User Schedule>    MEDICATIONS  (PRN):    PAST MEDICAL & SURGICAL HISTORY:  History of cocaine use quit many years ago  Former smoker  ESRD on dialysis  Atrial fibrillation  COVID-19 october 2020  Hyperlipemia  HTN (hypertension)  Seizures last seizure 10 years ago  CVA (cerebral vascular accident)  CHF (congestive heart failure)  Status post double vessel coronary artery bypass  H/O colectomy  Aorta disorder S/P repair Type A dissection  S/P AVR/MVR      Physical Exam:  General: NAD, resting comfortably in bed  Pulmonary: Nonlabored breathing, no respiratory distress. R IJ tunneled catheter in place. Site CDI  Cardiovascular: Normal S1, S2  Abdominal: soft, NT/ND  Extremities: LUE edematous. AVG with palp thrill and audible bruit. Dressings CDI. L hand with only baseline motor and sensory deficits as per pt. Compartments soft.    LABS:                        7.2    5.51  )-----------( 145      ( 29 Jan 2021 17:18 )             23.5     01-30    143  |  102  |  50.0<H>  ----------------------------<  99  2.9<LL>   |  26.0  |  4.46<H>    Ca    8.5<L>      30 Jan 2021 06:44  Phos  5.0     01-30  Mg     1.7     01-30    TPro  6.1<L>  /  Alb  3.1<L>  /  TBili  0.5  /  DBili  x   /  AST  9   /  ALT  13  /  AlkPhos  63  01-29    PT/INR - ( 30 Jan 2021 06:44 )   PT: 18.3 sec;   INR: 1.61 ratio         PTT - ( 30 Jan 2021 06:44 )  PTT:34.7 sec    Assessment:60y Male with complex cardiac history, ESRD on HD and BRBPR prior to admission   S/P L AVG placement     POD#1  C/B post op hypotension  Acute blood loss anemia likely from operative procedure    Plan:  Hold Coumadin  Monitor LUE neuro vasc status  Cardiology consult for post op hypotension  GI consult called for BRBPR and clearance to resume AC  HD today vua R IJ tunneled catheter with transfusion of 1 uPRBC   OOB/Ambulate/PT  DM management  DC home if no cardiac or GI interventions recommended
Post-op Check    Subjective:  Pt offers no acute complaints at this time. Pain well controlled on current regiment. Denies chest pain, SOB, palpitations. Patient post-op was hypotensive as low as 78 systolic, originally given 1 liter bolus of IV fluids and pushes of nishi with no resolution. Post-op labs ordered and showed drop in hemoglobin to 7.2 from 9, ekg wnl, troponin wnl. Patient given a 1 unit packed red cells and responded well, during this time patient remained neurologically intact and only complaints were of hunger. Dr. Argueta aware and spoke and examined the patient in pacu and the patient agreed to stay overnight, renal called to have patient get dialysis prior to discharge as he has an outpatient appt at 10:30am. Dr. Daniels (nephrologist) called to ask if patient can be ordered an additional unit of blood during his dialysis treatment which is now ordered. Patient will also get a consult from GI as he has been having complaints of bloody stools the most recent yesterday. GI to be called this am. Pt offers no acute complaints at this time. Pain well controlled on current regiment. Denies chest pain, SOB, palpitations.     STATUS POST:  Creation, AV fistula, loop, using Page-Ravi graft     POST OPERATIVE DAY #: 1    MEDICATIONS  (STANDING):  ascorbic acid 500 milliGRAM(s) Oral daily  atorvastatin 40 milliGRAM(s) Oral at bedtime  epoetin yolanda-epbx (RETACRIT) Injectable 57052 Unit(s) IV Push <User Schedule>  gabapentin 100 milliGRAM(s) Oral at bedtime  levETIRAcetam  IVPB 750 milliGRAM(s) IV Intermittent every 12 hours  metoprolol tartrate 25 milliGRAM(s) Oral two times a day  pantoprazole    Tablet 40 milliGRAM(s) Oral before breakfast  sevelamer carbonate 800 milliGRAM(s) Oral <User Schedule>    MEDICATIONS  (PRN):      Vital Signs Last 24 Hrs  T(C): 36.5 (30 Jan 2021 00:49), Max: 36.7 (29 Jan 2021 10:16)  T(F): 97.7 (30 Jan 2021 00:49), Max: 98 (29 Jan 2021 10:16)  HR: 77 (30 Jan 2021 00:49) (55 - 82)  BP: 116/64 (30 Jan 2021 00:49) (74/44 - 126/85)  BP(mean): 90 (29 Jan 2021 20:30) (90 - 98)  RR: 18 (30 Jan 2021 00:49) (10 - 21)  SpO2: 100% (30 Jan 2021 00:49) (97% - 100%)    Physical Exam:    Constitutional: NAD, " Im just so cold in this room" otherwise in good spirits   HEENT: PERRL, EOMI  Neck: No JVD, FROM without pain  Respiratory: no accessory muscle use, respirations non-labored  GI: abdomen soft, non-tender, atraumatic   vascular: left forearm edematous but soft and stable from prior exam, warm to touch, nvi  Neurological: A&O x 3; without gross deficit    A:     P:  dialysis today, renal consulted  monitor lue, neurovascular checks   GI consult  additional unit of blood during dialysis   pain control  renal diet   Continue current care  Pain control  OOB as tolerated  Encourage IS  DVT ppx

## 2021-01-30 NOTE — CONSULT NOTE ADULT - ASSESSMENT
60M with post-operative hypotension, likely the setting of acute blood loss anemia    Hypotension  - resolved  - recheck Hb after transfusion, maintain Hb> 8  - avoid dehydration; volume management via HD    CAD  - mildly elevated Tn, checked without chest pain context  - likely elevated in setting of anemia and reduced renal clearance, but need to trend  - if uptrending, will need echo and additional cardiac testing; if flat or downtrending, can follow up with primary Cardiologist for echo as an outpatient  - continue ASA, statin, metoprolol    AFl  - stable, rate controlled  - resume Coumadin when safe from post-op and bleeding standpoint  - continue lopressor for rate control  60M with post-operative hypotension, likely the setting of acute blood loss anemia    Hypotension  - resolved  - recheck Hb after transfusion, maintain Hb> 8  - avoid dehydration; volume management via HD    CAD  - mildly elevated Tn, checked without chest pain context  - likely elevated in setting of anemia and reduced renal clearance, but need to trend  - if uptrending, will need echo and additional cardiac testing; if flat or downtrending, can follow up with primary Cardiologist for echo as an outpatient  - continue ASA, statin, metoprolol    AFl  - stable, rate controlled  - resume Coumadin when safe from post-op and bleeding standpoint  - continue lopressor for rate control     Hypokalemia  -primary volume and electrolyte management via HD  - replete K to >4  - would recheck after repletion prior to d/c

## 2021-01-30 NOTE — CONSULT NOTE ADULT - PROBLEM SELECTOR RECOMMENDATION 2
S/P colon resection 2012   hemorrhoids on exam. Brown stool, BLeeding may be due to hemorrhoids on anticoagulation  - I offered colonoscopy on monday as pt is off coumadin. Ideally like coumadin less than 1. 4 for procedures. WOuld need both cardiac and renal clearance for colonosocpy.     However, pt refuses to stay for the colonoscopy. Therefore, would give Hydrocortisone  2.5 % cream prn rectal bleeding.   May start coumadin and keep at low therapeutic level.      CAn do colonoscopy as outpatient ( at [facility] ). F/U in one week  ( will need to be off coumadin for 5-7 days for procedure)

## 2021-01-30 NOTE — CONSULT NOTE ADULT - PROBLEM SELECTOR RECOMMENDATION 9
Maybe due to intraoperative bleeding?  rectal bleeding was not significant and pt has brown stool on exam today with hemorrhoids.    INR is 1.6  check post transfusion hgb

## 2021-02-02 PROBLEM — Z87.891 PERSONAL HISTORY OF NICOTINE DEPENDENCE: Chronic | Status: ACTIVE | Noted: 2021-01-25

## 2021-02-02 PROBLEM — R56.9 UNSPECIFIED CONVULSIONS: Chronic | Status: ACTIVE | Noted: 2021-01-25

## 2021-02-02 PROBLEM — I48.91 UNSPECIFIED ATRIAL FIBRILLATION: Chronic | Status: ACTIVE | Noted: 2021-01-25

## 2021-02-02 PROBLEM — E78.5 HYPERLIPIDEMIA, UNSPECIFIED: Chronic | Status: ACTIVE | Noted: 2021-01-25

## 2021-02-02 PROBLEM — U07.1 COVID-19: Chronic | Status: ACTIVE | Noted: 2021-01-25

## 2021-02-02 PROBLEM — I10 ESSENTIAL (PRIMARY) HYPERTENSION: Chronic | Status: ACTIVE | Noted: 2021-01-25

## 2021-02-04 ENCOUNTER — APPOINTMENT (OUTPATIENT)
Dept: VASCULAR SURGERY | Facility: CLINIC | Age: 61
End: 2021-02-04
Payer: MEDICARE

## 2021-02-04 VITALS
BODY MASS INDEX: 28.56 KG/M2 | RESPIRATION RATE: 16 BRPM | DIASTOLIC BLOOD PRESSURE: 80 MMHG | HEIGHT: 67 IN | TEMPERATURE: 97.3 F | OXYGEN SATURATION: 94 % | WEIGHT: 182 LBS | SYSTOLIC BLOOD PRESSURE: 126 MMHG | HEART RATE: 122 BPM

## 2021-02-04 PROCEDURE — 99024 POSTOP FOLLOW-UP VISIT: CPT

## 2021-02-04 NOTE — PHYSICAL EXAM
[1+] : left 1+ [Alert] : alert [Oriented to Person] : oriented to person [Oriented to Place] : oriented to place [Oriented to Time] : oriented to time [FreeTextEntry1] : L AVG with + thrill. LUE edematous and ecchymotic with blanchable erythema. Incision line with granulation-no drainage. L hand with diminished sensory sec to prior CVA

## 2021-02-04 NOTE — REASON FOR VISIT
[de-identified] : S/P L forearm loop AVG [de-identified] : 1/29/21 [de-identified] : Pt presents today for follow up after L AVG and c/o swelling and pain in LUE. Pt had edema prior to discharge and reports it has increased. He states he is keeping LUE elevated but bruising and edema worse. Also reports increased discomfort. He receives dialysis T/Th/Sat through hi R IJ tunneled catheter without difficulty. He has resumed his Coumadin and his last INR was performed yesterday and is awaiting results/instructions. He was seen in hospital by GI prior to discharge for c/o rectal bleeding but was cleared to start AC. He has had GI followup with plan for colonoscopy in future

## 2021-02-05 ENCOUNTER — APPOINTMENT (OUTPATIENT)
Dept: CARDIOLOGY | Facility: CLINIC | Age: 61
End: 2021-02-05
Payer: MEDICARE

## 2021-02-05 PROCEDURE — 93306 TTE W/DOPPLER COMPLETE: CPT

## 2021-02-17 ENCOUNTER — APPOINTMENT (OUTPATIENT)
Dept: VASCULAR SURGERY | Facility: CLINIC | Age: 61
End: 2021-02-17
Payer: MEDICARE

## 2021-02-17 VITALS
TEMPERATURE: 98.3 F | WEIGHT: 185 LBS | BODY MASS INDEX: 29.03 KG/M2 | HEIGHT: 67 IN | HEART RATE: 87 BPM | OXYGEN SATURATION: 96 % | SYSTOLIC BLOOD PRESSURE: 114 MMHG | DIASTOLIC BLOOD PRESSURE: 66 MMHG

## 2021-02-17 PROCEDURE — 99024 POSTOP FOLLOW-UP VISIT: CPT

## 2021-02-17 PROCEDURE — 93990 DOPPLER FLOW TESTING: CPT

## 2021-02-17 NOTE — REASON FOR VISIT
[de-identified] : S/P L forearm loop AVG [de-identified] : 1/29/21 [de-identified] : LUE is less edematous and pt reports his Keflex was continued by PMD. Erythema is improving. He has discomfort in L arm at baseline with occasional times where it is worse but denies any L hand pain. He continues to receive HD via his tunneled catheter without difficulty. He denies any fever or chills

## 2021-02-17 NOTE — ASSESSMENT
[FreeTextEntry1] : 61 y/o M with ESRD on HD via R IJ tunneled HD catheter, now S/P L AVG placement on 1/29/21 by Dr Argueta. POst op hematoma improving. He continues HD via tunneled catheter without issue\par \par US performed today reveals patent AVG and graft marked for access

## 2021-02-17 NOTE — PHYSICAL EXAM
[1+] : left 1+ [Alert] : alert [Oriented to Person] : oriented to person [Oriented to Place] : oriented to place [Oriented to Time] : oriented to time [FreeTextEntry1] : L AVG with + thrill. LUE edema,ecchymosis and erythema all improved. Incision line with granulation-no drainage. L hand with diminished sensory sec to prior CVA

## 2021-02-17 NOTE — HISTORY OF PRESENT ILLNESS
[FreeTextEntry1] : 59 y/o M with ESRD on HD via R IJ tunneled catheter who underwent L AVG by Dr Argueta on 1/29/21. Post op course complicated by hematoma. He has had c/o swelling and pain in LUE. Pt had edema prior to discharge and reports it has increased. He states he is keeping LUE elevated but bruising and edema worse. Also reports increased discomfort. He receives dialysis T/Th/Sat through hi R IJ tunneled catheter without difficulty. He has resumed his Coumadin and his last INR was performed yesterday and is awaiting results/instructions. He was seen in hospital by GI prior to discharge for c/o rectal bleeding but was cleared to start AC. He has had GI followup with plan for colonoscopy in future

## 2021-02-23 ENCOUNTER — APPOINTMENT (OUTPATIENT)
Dept: CARDIOLOGY | Facility: CLINIC | Age: 61
End: 2021-02-23
Payer: MEDICARE

## 2021-02-23 VITALS
SYSTOLIC BLOOD PRESSURE: 120 MMHG | HEART RATE: 117 BPM | HEIGHT: 67 IN | DIASTOLIC BLOOD PRESSURE: 62 MMHG | OXYGEN SATURATION: 100 % | BODY MASS INDEX: 29.03 KG/M2 | WEIGHT: 185 LBS | TEMPERATURE: 98.3 F | RESPIRATION RATE: 16 BRPM

## 2021-02-23 PROCEDURE — 93000 ELECTROCARDIOGRAM COMPLETE: CPT

## 2021-02-23 PROCEDURE — 99214 OFFICE O/P EST MOD 30 MIN: CPT

## 2021-03-04 ENCOUNTER — APPOINTMENT (OUTPATIENT)
Dept: VASCULAR SURGERY | Facility: CLINIC | Age: 61
End: 2021-03-04

## 2021-03-04 ENCOUNTER — APPOINTMENT (OUTPATIENT)
Dept: VASCULAR SURGERY | Facility: CLINIC | Age: 61
End: 2021-03-04
Payer: MEDICARE

## 2021-03-04 VITALS
OXYGEN SATURATION: 96 % | WEIGHT: 185 LBS | BODY MASS INDEX: 29.03 KG/M2 | DIASTOLIC BLOOD PRESSURE: 70 MMHG | SYSTOLIC BLOOD PRESSURE: 102 MMHG | HEART RATE: 96 BPM | TEMPERATURE: 98.6 F | HEIGHT: 67 IN

## 2021-03-04 PROCEDURE — 36589 REMOVAL TUNNELED CV CATH: CPT | Mod: 78

## 2021-03-04 NOTE — ASSESSMENT
[FreeTextEntry1] : Informed consent was obtained\par The right chest wall was prepped and draped in a sterile fashion.\par Local anesthesia was infiltrated at the catheter entry site.\par Using blunt dissection, the catheter cuff was dissected freely, the catheter was removed without resistance. The catheter was inspected and confirmed to be removed in its entirety.\par Hemostasis was achieved using manual pressure.\par Procedure was well tolerated.\par EBL 2cc\par \par \par Plan\par -Continue to use Left arm AV graft for HD\par -Return in 3 months for AV graft duplex

## 2021-03-04 NOTE — HISTORY OF PRESENT ILLNESS
[FreeTextEntry1] : 60 year old man with history of Type A aortic dissection s/p repair in 2010 and recent CABG x2, Mitral valve and aortic valve replacement in November 2020. Patient has been on HD via a right IJ tunneled HD catheter since November 2020. Patient referred here for long term AV access.\par Patient is right hand dominant. he had a previous stroke and has some residual left arm weakness [de-identified] : JONATHAN GIBSON is status post S/P L forearm loop AVG and he is here for a post-op visit. \par Left arm AV graft is working well. He has used it for HD for the last 2 weeks.\par He denies left arm weakness, pain or swelling

## 2021-03-08 ENCOUNTER — APPOINTMENT (OUTPATIENT)
Dept: VASCULAR SURGERY | Facility: CLINIC | Age: 61
End: 2021-03-08

## 2021-03-12 ENCOUNTER — INPATIENT (INPATIENT)
Facility: HOSPITAL | Age: 61
LOS: 3 days | Discharge: HOME | DRG: 377 | End: 2021-03-16
Attending: FAMILY MEDICINE | Admitting: HOSPITALIST
Payer: MEDICARE

## 2021-03-12 VITALS
OXYGEN SATURATION: 100 % | TEMPERATURE: 98 F | HEIGHT: 69 IN | RESPIRATION RATE: 16 BRPM | HEART RATE: 123 BPM | SYSTOLIC BLOOD PRESSURE: 141 MMHG | DIASTOLIC BLOOD PRESSURE: 72 MMHG | WEIGHT: 184.97 LBS

## 2021-03-12 DIAGNOSIS — K92.2 GASTROINTESTINAL HEMORRHAGE, UNSPECIFIED: ICD-10-CM

## 2021-03-12 DIAGNOSIS — Z90.49 ACQUIRED ABSENCE OF OTHER SPECIFIED PARTS OF DIGESTIVE TRACT: Chronic | ICD-10-CM

## 2021-03-12 DIAGNOSIS — Z95.1 PRESENCE OF AORTOCORONARY BYPASS GRAFT: Chronic | ICD-10-CM

## 2021-03-12 DIAGNOSIS — I77.9 DISORDER OF ARTERIES AND ARTERIOLES, UNSPECIFIED: Chronic | ICD-10-CM

## 2021-03-12 DIAGNOSIS — D62 ACUTE POSTHEMORRHAGIC ANEMIA: ICD-10-CM

## 2021-03-12 LAB
ALBUMIN SERPL ELPH-MCNC: 3.6 G/DL — SIGNIFICANT CHANGE UP (ref 3.3–5.2)
ALP SERPL-CCNC: 67 U/L — SIGNIFICANT CHANGE UP (ref 40–120)
ALT FLD-CCNC: 14 U/L — SIGNIFICANT CHANGE UP
ANION GAP SERPL CALC-SCNC: 14 MMOL/L — SIGNIFICANT CHANGE UP (ref 5–17)
ANISOCYTOSIS BLD QL: SLIGHT — SIGNIFICANT CHANGE UP
APTT BLD: 41.3 SEC — HIGH (ref 27.5–35.5)
AST SERPL-CCNC: 16 U/L — SIGNIFICANT CHANGE UP
BASOPHILS # BLD AUTO: 0 K/UL — SIGNIFICANT CHANGE UP (ref 0–0.2)
BASOPHILS NFR BLD AUTO: 0 % — SIGNIFICANT CHANGE UP (ref 0–2)
BILIRUB SERPL-MCNC: 0.6 MG/DL — SIGNIFICANT CHANGE UP (ref 0.4–2)
BLD GP AB SCN SERPL QL: SIGNIFICANT CHANGE UP
BUN SERPL-MCNC: 27 MG/DL — HIGH (ref 8–20)
CALCIUM SERPL-MCNC: 8.3 MG/DL — LOW (ref 8.6–10.2)
CHLORIDE SERPL-SCNC: 96 MMOL/L — LOW (ref 98–107)
CO2 SERPL-SCNC: 29 MMOL/L — SIGNIFICANT CHANGE UP (ref 22–29)
CREAT SERPL-MCNC: 3.25 MG/DL — HIGH (ref 0.5–1.3)
EOSINOPHIL # BLD AUTO: 0.81 K/UL — HIGH (ref 0–0.5)
EOSINOPHIL NFR BLD AUTO: 13.5 % — HIGH (ref 0–6)
GIANT PLATELETS BLD QL SMEAR: PRESENT — SIGNIFICANT CHANGE UP
GLUCOSE SERPL-MCNC: 88 MG/DL — SIGNIFICANT CHANGE UP (ref 70–99)
HCT VFR BLD CALC: 14 % — CRITICAL LOW (ref 39–50)
HGB BLD-MCNC: 4.4 G/DL — CRITICAL LOW (ref 13–17)
HYPOCHROMIA BLD QL: SLIGHT — SIGNIFICANT CHANGE UP
INR BLD: 3.17 RATIO — HIGH (ref 0.88–1.16)
LYMPHOCYTES # BLD AUTO: 1.41 K/UL — SIGNIFICANT CHANGE UP (ref 1–3.3)
LYMPHOCYTES # BLD AUTO: 23.5 % — SIGNIFICANT CHANGE UP (ref 13–44)
MACROCYTES BLD QL: SLIGHT — SIGNIFICANT CHANGE UP
MANUAL SMEAR VERIFICATION: SIGNIFICANT CHANGE UP
MCHC RBC-ENTMCNC: 31.4 GM/DL — LOW (ref 32–36)
MCHC RBC-ENTMCNC: 31.4 PG — SIGNIFICANT CHANGE UP (ref 27–34)
MCV RBC AUTO: 100 FL — SIGNIFICANT CHANGE UP (ref 80–100)
MONOCYTES # BLD AUTO: 0.25 K/UL — SIGNIFICANT CHANGE UP (ref 0–0.9)
MONOCYTES NFR BLD AUTO: 4.2 % — SIGNIFICANT CHANGE UP (ref 2–14)
NEUTROPHILS # BLD AUTO: 3.53 K/UL — SIGNIFICANT CHANGE UP (ref 1.8–7.4)
NEUTROPHILS NFR BLD AUTO: 58.8 % — SIGNIFICANT CHANGE UP (ref 43–77)
OB PNL STL: POSITIVE
PLAT MORPH BLD: NORMAL — SIGNIFICANT CHANGE UP
PLATELET # BLD AUTO: 142 K/UL — LOW (ref 150–400)
POTASSIUM SERPL-MCNC: 3.2 MMOL/L — LOW (ref 3.5–5.3)
POTASSIUM SERPL-SCNC: 3.2 MMOL/L — LOW (ref 3.5–5.3)
PROT SERPL-MCNC: 6.1 G/DL — LOW (ref 6.6–8.7)
PROTHROM AB SERPL-ACNC: 34.8 SEC — HIGH (ref 10.6–13.6)
RBC # BLD: 1.4 M/UL — LOW (ref 4.2–5.8)
RBC # FLD: 15 % — HIGH (ref 10.3–14.5)
RBC BLD AUTO: ABNORMAL
SODIUM SERPL-SCNC: 139 MMOL/L — SIGNIFICANT CHANGE UP (ref 135–145)
WBC # BLD: 6 K/UL — SIGNIFICANT CHANGE UP (ref 3.8–10.5)
WBC # FLD AUTO: 6 K/UL — SIGNIFICANT CHANGE UP (ref 3.8–10.5)

## 2021-03-12 PROCEDURE — 74177 CT ABD & PELVIS W/CONTRAST: CPT | Mod: 26,MA

## 2021-03-12 PROCEDURE — 99223 1ST HOSP IP/OBS HIGH 75: CPT

## 2021-03-12 PROCEDURE — 99222 1ST HOSP IP/OBS MODERATE 55: CPT

## 2021-03-12 PROCEDURE — 93010 ELECTROCARDIOGRAM REPORT: CPT

## 2021-03-12 PROCEDURE — 99291 CRITICAL CARE FIRST HOUR: CPT | Mod: CS,GC

## 2021-03-12 RX ORDER — POTASSIUM CHLORIDE 20 MEQ
40 PACKET (EA) ORAL ONCE
Refills: 0 | Status: COMPLETED | OUTPATIENT
Start: 2021-03-12 | End: 2021-03-12

## 2021-03-12 RX ORDER — IOHEXOL 300 MG/ML
30 INJECTION, SOLUTION INTRAVENOUS ONCE
Refills: 0 | Status: DISCONTINUED | OUTPATIENT
Start: 2021-03-12 | End: 2021-03-12

## 2021-03-12 RX ADMIN — Medication 40 MILLIEQUIVALENT(S): at 23:34

## 2021-03-12 NOTE — ED PROVIDER NOTE - NS ED ROS FT
General: Denies fever, chills  HEENT: Denies sensory changes, sore throat  Neck: Denies neck pain, neck stiffness  Resp: Denies coughing, SOB  Cardiovascular: Denies CP, palpitations, LE edema  GI: Endorses BRBPR  : Denies dysuria, hematuria, incontinence  MSK: Denies back pain  Neuro: Denies HA, dizziness, numbness, weakness  Skin: Denies rashes

## 2021-03-12 NOTE — H&P ADULT - NSHPPHYSICALEXAM_GEN_ALL_CORE
Vital Signs Last 24 Hrs  T(C): 36.8 (13 Mar 2021 00:47), Max: 36.9 (12 Mar 2021 21:05)  T(F): 98.3 (13 Mar 2021 00:47), Max: 98.4 (12 Mar 2021 21:05)  HR: 96 (13 Mar 2021 00:47) (84 - 123)  BP: 103/66 (13 Mar 2021 00:47) (103/66 - 141/72)  BP(mean): --  RR: 16 (13 Mar 2021 00:47) (14 - 16)  SpO2: 99% (13 Mar 2021 00:47) (99% - 100%)    GENERAL:  Well-appearing, not in acute distress  EYES:  Clear conjunctiva, extraocular movement intact  ENT: Moist mucous membranes  RESP:  Non-labored breathing pattern, lungs clear to ausculation   CV: Regular rate and rhythm, no murmurs appreciated, no lower extremity edema, LUE AVF with thrill  GI: Soft, non-tender, non-distended  NEURO: Awake, alert, conversant, upper and lower extremity strength 5/5, light touch sensation grossly intact  PSYCH: Calm, cooperative  SKIN: No rash or lesions, warm and dry

## 2021-03-12 NOTE — H&P ADULT - NSHPLABSRESULTS_GEN_ALL_CORE
4.4    6.00  )-----------( 142      ( 12 Mar 2021 17:24 )             14.0         03-12    139  |  96<L>  |  27.0<H>  ----------------------------<  88  3.2<L>   |  29.0  |  3.25<H>    Ca    8.3<L>      12 Mar 2021 17:24    TPro  6.1<L>  /  Alb  3.6  /  TBili  0.6  /  DBili  x   /  AST  16  /  ALT  14  /  AlkPhos  67  03-12

## 2021-03-12 NOTE — ED PROVIDER NOTE - OBJECTIVE STATEMENT
Pt is a 61yo M sent by his nephrologist for low hemoglobin. He states that he was at dialysis yesterday and that today he got a call that his hemoglobin was very low and to come in to the hospital. He reports that he has been having a months long history of LGIB with bright bloody stools, but that they have worsened over the last week. He states that he has a complex medical history. Notes colon resection 11 years ago for diverticulitis, AV valve replacement currently on warfarin, and LGIB in the past year that he had a scope for without any findings. He states he planned to see a new GI doctor but has yet to see them. He denies any fevers, chills, abdominal pain, nausea, vomiting.

## 2021-03-12 NOTE — ED ADULT NURSE NOTE - OBJECTIVE STATEMENT
Pt c/o blood in stool, intermittently for past few months.  Clear bsb, abd soft nondistended, nontender, moving all ext well.  Pt denies any pain.  VSS.  CM in place, NSR.  Pt was told by PMD to come to ED fro evaluation.

## 2021-03-12 NOTE — ED PROVIDER NOTE - PROGRESS NOTE DETAILS
Hgb 4.4 Will transfuse 3 units. SPoke with FARHEEN Garzon for urgent evaluation. Will obtain CTA with PO and IV contrast for further characterization of GIB. Reviewed all results with pt as well as plans for admission. Pt is comfortable with plan for admission. Questions answered. - Colby Rodriguez, PGY-2

## 2021-03-12 NOTE — H&P ADULT - NSICDXPASTSURGICALHX_GEN_ALL_CORE_FT
PAST SURGICAL HISTORY:  Aorta disorder     H/O colectomy     S/P AVR (aortic valve replacement)     S/P MVR (mitral valve replacement)     Status post double vessel coronary artery bypass

## 2021-03-12 NOTE — ED PROVIDER NOTE - PMH
Atrial fibrillation    CHF (congestive heart failure)    COVID-19  october 2020  CVA (cerebral vascular accident)    ESRD on dialysis    Former smoker    History of cocaine use  quit "many years ago"  HTN (hypertension)    Hyperlipemia    Seizures  last seizure 10 years ago

## 2021-03-12 NOTE — ED PROVIDER NOTE - ATTENDING CONTRIBUTION TO CARE
AJM: pt with lower Gi bleeding on coumadin and history of bowel resection. + FOBT. benign abd exam. given history of colonic anastomosis will obtain cta a/p with PO and IV contrast. will need gi consult as well. also with significant anemia requiring transfusion.

## 2021-03-12 NOTE — ED PROVIDER NOTE - CLINICAL SUMMARY MEDICAL DECISION MAKING FREE TEXT BOX
Patient presenting with reported low hemoglobin after dialysis. Patient has a chronic history of LGIB. Will evaluate with labwork including type and screen, inr, cbc, cmp. Will likely need transfusion.

## 2021-03-12 NOTE — ED ADULT TRIAGE NOTE - CHIEF COMPLAINT QUOTE
Patient A&Ox4, denies any pain or discomfort. Stated sent by MD due to abnormal labs, blood stool for a couple of days. Sent by dialysis center, T, Th, S

## 2021-03-12 NOTE — H&P ADULT - ASSESSMENT
60yoM extensive PMHx including ESRD on HD (since ~10/2020) s/p LUE AVF (1/2021), CAD s/p remote PCI/GABG, AT/Aflutter, aortic insufficiency and mitral regurgitation s/p bioAVR and bioMVR (11/2020), on coumadin, HFrEF (EF 40%), ascending aortic dissection s/p surgery (~2010) complicated by CVA with residual left-sided weakness, colon resection for bowel resection s/p ostomy with reversal, also hx HTN, seizure disorder, psoriasis s/p Humira injections who was sent to hospital after dialysis center labs taken a few days ago revealed anemia, on admission found to have Hgb of 4.4, +FOBT    Acute on chronic blood loss anemia   -Multifactorial from GI bleeding (intermittent hematochezia and occasional melena) and bleeding from AVF intra-HD sessions  -Pt still reports having active hematochezia in ED  -Admission Hgb 4.4, baseline Hgb 7-9, BP stable thus far  -Seen by GI, recommended 3pRBC, 1 pRBC ordered by ED  -Plan to order 2nd pRBC if pt not having dyspnea or hypoxia  -Will order IV Lasix 60mg x1 pre 2nd pRBC as pt still makes urine  -Post transfusion CBC (after 2nd unit) order for around 6AM, 2hr after 2nd pRBC expected to finish  -INR 3.17, will not reverse given hx bioMVR with prior CVA, will allow to natural INR decline  -Hold ASA and coumadin  -Trend CBC q6-8hr, maintain type and screen  -Telemetry and  monitoring    Lower GI bleeding  -Pt with several month hx of intermittent painless, hematochezia  -CT w/out active GI bleed (limited as done w/out contrast) showing colectomy with ileorectal anastomosis, no diverticulosis noted, possible occult anastomotic bleeding     -IV protonix 40mg BID given occasional melena  -GI consulted and following, recommending cardiology and nephrology evaluations  -Cardiology ([facility]) and nephrology (Dr. Siu)  -Renal diet for now as pt requires further medical optimization prior to any procedures    Hypokalemia  -K+ 3.2, repleted with PO K+ 40mEq (only 1 dose given to minimize risk of high K+ since ESRD)  -Repeat BMP, Mg in AM  -Telemetry monitoring    ESRD on HD  -Received HD on Tuesday/Thursday/Saturday, less completed session on 3/11  -Sevelamer resumed  -Nephrology consulted    Hx Afib and valvular disease s/p bioprosthetic AVR and MVR  -INR 3.17, holding coumadin in setting of acute blood loss anemia as above  -Trend INR  -Metoprolol resumed (varying doses on HD vs. non-HD days)    Hx CVA   -On ASA and coumadin, holding in setting of GI bleeding  -On gabapentin for residual LUE neuropathic pain     Hx CAD  -Holding ASA due to GI bleeding  -Statin resumed    Hx seizure disorder  -Keppra resumed    Hx HTN  -Metoprolol resumed    Prophylactic measure  -Holding coumadin

## 2021-03-12 NOTE — ED ADULT NURSE REASSESSMENT NOTE - NS ED NURSE REASSESS COMMENT FT1
Assumed pt care at this time. Pt resting comfortably in stretcher, A&Ox3, NAD noted, respirations even and nonlabored, NSR on monitor. Pt offers no complaints at this time. Awaiting CT and PRBCs.

## 2021-03-12 NOTE — H&P ADULT - NSICDXPASTMEDICALHX_GEN_ALL_CORE_FT
PAST MEDICAL HISTORY:  Atrial fibrillation     CHF (congestive heart failure)     COVID-19 october 2020    CVA (cerebral vascular accident)     ESRD on dialysis Tu/Thurs/Sat    Former smoker     H/O aortic dissection s/p repair (2010), surgery complicated by bowel ischemia s/p bowel resection and ostomy with reversal    H/O aortic valve insufficiency     History of cocaine use remote hx, currently sober    HTN (hypertension)     Hyperlipemia     Mitral regurgitation     Seizures last seizure 10 years ago

## 2021-03-12 NOTE — CONSULT NOTE ADULT - PROBLEM SELECTOR RECOMMENDATION 9
- occurs intermittently  - brown stool on exam today  - will need to get cardiac clearance from renal and cardiology. Will need to know from cardiology whether FFP can be given or should we let the INR come down naturally as pt is not actively bleeding now.   - give 3 units PRBC

## 2021-03-12 NOTE — H&P ADULT - HISTORY OF PRESENT ILLNESS
60yoM extensive PMHx including ESRD on HD on Tu/Thur/Sat (since ~10/2020) s/p LUE AVF (1/2021), CAD s/p remote PCI, AT/Aflutter, aortic insufficiency and mitral regurgitation s/p bioAVR and bioMVR, CABG(11/2020), on coumadin, HFrEF (EF 40%), ascending aortic dissection s/p surgery (~2010) complicated by CVA with residual left-sided weakness, colon resection for bowel resection s/p ostomy with reversal, also hx HTN, seizure disorder, psoriasis s/p Humira injections, remote hx of cocaine/EtOH abuse, who was sent to hospital after dialysis center labs taken a few days ago revealed anemia.  On admission, pt found to have Hgb of 4.4 (baseline Hgb 7-9) with positive FOBT.  Pt has been having intermittent painless hematochezia for several months with some occasional dark stools including episodes of this during his previous hospitalization in January with worsening anemia.  During that time, he was seen by GI and per documentation, pt declined inpatient colonoscopy opted for hemorrhoid rectal cream and to follow up regarding outpatient colonoscopy.  Pt reports remote hx of endoscopy and colonoscopy which he says was done for ‘corn stuck in his colon’, but pt unable to remember what the reports showed and states he wasn't told about anything serious although these were done prior to his colon surgeries.  Pt has not had endoscopy or colonoscopy in decades and has not had any outpatient GI evaluation since onset of his GI bleeding. Pt reports being hesitant due to his complicated anatomy given his hx of bowel resection and prior ostomy.  Pt also reports episodes of prolonged bleeding from his AVF, lasting about 30 minutes at a time during his HD sessions.    60yoM extensive PMHx including ESRD on HD on Tu/Thur/Sat (since ~10/2020) s/p LUE AVF (1/2021), CAD s/p remote PCI, AT/Aflutter, aortic insufficiency and mitral regurgitation s/p bioAVR and bioMVR, CABG(11/2020), on coumadin, HFrEF (EF 40%), ascending aortic dissection s/p surgery (~2010) complicated by CVA with residual left-sided weakness, colon resection for bowel resection s/p ostomy with reversal, also hx HTN, seizure disorder, psoriasis s/p Humira injections, remote hx of cocaine/EtOH abuse, who was sent to hospital after dialysis center labs taken a few days ago revealed anemia.  On admission, pt found to have Hgb of 4.4 (baseline Hgb 7-9) with positive FOBT.  Pt has been having intermittent painless hematochezia for several months with some occasional dark stools including episodes of this during his previous hospitalization in January with worsening anemia.  During that time, he was seen by GI and per documentation, pt declined inpatient colonoscopy opted for hemorrhoid rectal cream and to follow up regarding outpatient colonoscopy.  Pt reports remote hx of endoscopy and colonoscopy which he says was done for ‘corn stuck in his colon’, but pt unable to remember what the reports showed and states he wasn't told about anything serious although these were done prior to his colon surgeries.  Pt has not had endoscopy or colonoscopy in decades and has not had any outpatient GI evaluation since onset of his GI bleeding. Pt reports being hesitant due to his complicated anatomy given his hx of bowel resection and prior ostomy.  Pt also reports episodes of prolonged bleeding from his AVF, lasting about 30 minutes at a time during his HD sessions. Pt reports some generalized weakness dizziness and exertional dyspnea over past few days.

## 2021-03-13 DIAGNOSIS — Z95.2 PRESENCE OF PROSTHETIC HEART VALVE: Chronic | ICD-10-CM

## 2021-03-13 LAB
ALBUMIN SERPL ELPH-MCNC: 4.1 G/DL — SIGNIFICANT CHANGE UP (ref 3.3–5.2)
ANION GAP SERPL CALC-SCNC: 17 MMOL/L — SIGNIFICANT CHANGE UP (ref 5–17)
ANION GAP SERPL CALC-SCNC: 17 MMOL/L — SIGNIFICANT CHANGE UP (ref 5–17)
BUN SERPL-MCNC: 28 MG/DL — HIGH (ref 8–20)
BUN SERPL-MCNC: 28 MG/DL — HIGH (ref 8–20)
CALCIUM SERPL-MCNC: 8.7 MG/DL — SIGNIFICANT CHANGE UP (ref 8.6–10.2)
CALCIUM SERPL-MCNC: 8.7 MG/DL — SIGNIFICANT CHANGE UP (ref 8.6–10.2)
CHLORIDE SERPL-SCNC: 96 MMOL/L — LOW (ref 98–107)
CHLORIDE SERPL-SCNC: 96 MMOL/L — LOW (ref 98–107)
CO2 SERPL-SCNC: 28 MMOL/L — SIGNIFICANT CHANGE UP (ref 22–29)
CO2 SERPL-SCNC: 28 MMOL/L — SIGNIFICANT CHANGE UP (ref 22–29)
CREAT SERPL-MCNC: 3.76 MG/DL — HIGH (ref 0.5–1.3)
CREAT SERPL-MCNC: 3.76 MG/DL — HIGH (ref 0.5–1.3)
GLUCOSE SERPL-MCNC: 93 MG/DL — SIGNIFICANT CHANGE UP (ref 70–99)
GLUCOSE SERPL-MCNC: 93 MG/DL — SIGNIFICANT CHANGE UP (ref 70–99)
HCT VFR BLD CALC: 23.3 % — LOW (ref 39–50)
HCT VFR BLD CALC: 25.8 % — LOW (ref 39–50)
HGB BLD-MCNC: 7.5 G/DL — LOW (ref 13–17)
HGB BLD-MCNC: 8.4 G/DL — LOW (ref 13–17)
MAGNESIUM SERPL-MCNC: 1.9 MG/DL — SIGNIFICANT CHANGE UP (ref 1.6–2.6)
MCHC RBC-ENTMCNC: 30.5 PG — SIGNIFICANT CHANGE UP (ref 27–34)
MCHC RBC-ENTMCNC: 30.9 PG — SIGNIFICANT CHANGE UP (ref 27–34)
MCHC RBC-ENTMCNC: 32.2 GM/DL — SIGNIFICANT CHANGE UP (ref 32–36)
MCHC RBC-ENTMCNC: 32.6 GM/DL — SIGNIFICANT CHANGE UP (ref 32–36)
MCV RBC AUTO: 94.7 FL — SIGNIFICANT CHANGE UP (ref 80–100)
MCV RBC AUTO: 94.9 FL — SIGNIFICANT CHANGE UP (ref 80–100)
PHOSPHATE SERPL-MCNC: 4.6 MG/DL — SIGNIFICANT CHANGE UP (ref 2.4–4.7)
PLATELET # BLD AUTO: 137 K/UL — LOW (ref 150–400)
PLATELET # BLD AUTO: 169 K/UL — SIGNIFICANT CHANGE UP (ref 150–400)
POTASSIUM SERPL-MCNC: 3.3 MMOL/L — LOW (ref 3.5–5.3)
POTASSIUM SERPL-MCNC: 3.3 MMOL/L — LOW (ref 3.5–5.3)
POTASSIUM SERPL-SCNC: 3.3 MMOL/L — LOW (ref 3.5–5.3)
POTASSIUM SERPL-SCNC: 3.3 MMOL/L — LOW (ref 3.5–5.3)
RBC # BLD: 2.46 M/UL — LOW (ref 4.2–5.8)
RBC # BLD: 2.72 M/UL — LOW (ref 4.2–5.8)
RBC # FLD: 16.2 % — HIGH (ref 10.3–14.5)
RBC # FLD: 16.3 % — HIGH (ref 10.3–14.5)
SARS-COV-2 IGG SERPL QL IA: POSITIVE
SARS-COV-2 IGM SERPL IA-ACNC: 20.4 INDEX — HIGH
SARS-COV-2 RNA SPEC QL NAA+PROBE: SIGNIFICANT CHANGE UP
SODIUM SERPL-SCNC: 141 MMOL/L — SIGNIFICANT CHANGE UP (ref 135–145)
SODIUM SERPL-SCNC: 141 MMOL/L — SIGNIFICANT CHANGE UP (ref 135–145)
WBC # BLD: 7.73 K/UL — SIGNIFICANT CHANGE UP (ref 3.8–10.5)
WBC # BLD: 8.09 K/UL — SIGNIFICANT CHANGE UP (ref 3.8–10.5)
WBC # FLD AUTO: 7.73 K/UL — SIGNIFICANT CHANGE UP (ref 3.8–10.5)
WBC # FLD AUTO: 8.09 K/UL — SIGNIFICANT CHANGE UP (ref 3.8–10.5)

## 2021-03-13 PROCEDURE — 99233 SBSQ HOSP IP/OBS HIGH 50: CPT

## 2021-03-13 PROCEDURE — 90937 HEMODIALYSIS REPEATED EVAL: CPT

## 2021-03-13 PROCEDURE — 99223 1ST HOSP IP/OBS HIGH 75: CPT | Mod: 25

## 2021-03-13 PROCEDURE — 93010 ELECTROCARDIOGRAM REPORT: CPT

## 2021-03-13 PROCEDURE — 99223 1ST HOSP IP/OBS HIGH 75: CPT

## 2021-03-13 RX ORDER — ASCORBIC ACID 60 MG
500 TABLET,CHEWABLE ORAL DAILY
Refills: 0 | Status: DISCONTINUED | OUTPATIENT
Start: 2021-03-13 | End: 2021-03-16

## 2021-03-13 RX ORDER — GABAPENTIN 400 MG/1
100 CAPSULE ORAL AT BEDTIME
Refills: 0 | Status: DISCONTINUED | OUTPATIENT
Start: 2021-03-13 | End: 2021-03-16

## 2021-03-13 RX ORDER — WARFARIN SODIUM 2.5 MG/1
1 TABLET ORAL
Qty: 0 | Refills: 0 | DISCHARGE

## 2021-03-13 RX ORDER — SEVELAMER CARBONATE 2400 MG/1
800 POWDER, FOR SUSPENSION ORAL
Refills: 0 | Status: DISCONTINUED | OUTPATIENT
Start: 2021-03-13 | End: 2021-03-16

## 2021-03-13 RX ORDER — POTASSIUM CHLORIDE 20 MEQ
20 PACKET (EA) ORAL ONCE
Refills: 0 | Status: COMPLETED | OUTPATIENT
Start: 2021-03-13 | End: 2021-03-13

## 2021-03-13 RX ORDER — LEVETIRACETAM 250 MG/1
750 TABLET, FILM COATED ORAL
Refills: 0 | Status: DISCONTINUED | OUTPATIENT
Start: 2021-03-13 | End: 2021-03-16

## 2021-03-13 RX ORDER — ATORVASTATIN CALCIUM 80 MG/1
40 TABLET, FILM COATED ORAL AT BEDTIME
Refills: 0 | Status: DISCONTINUED | OUTPATIENT
Start: 2021-03-13 | End: 2021-03-16

## 2021-03-13 RX ORDER — METOPROLOL TARTRATE 50 MG
25 TABLET ORAL EVERY 8 HOURS
Refills: 0 | Status: DISCONTINUED | OUTPATIENT
Start: 2021-03-13 | End: 2021-03-16

## 2021-03-13 RX ORDER — METOPROLOL TARTRATE 50 MG
25 TABLET ORAL
Refills: 0 | Status: DISCONTINUED | OUTPATIENT
Start: 2021-03-13 | End: 2021-03-13

## 2021-03-13 RX ORDER — SEVELAMER CARBONATE 2400 MG/1
1 POWDER, FOR SUSPENSION ORAL
Qty: 0 | Refills: 0 | DISCHARGE

## 2021-03-13 RX ORDER — DILTIAZEM HCL 120 MG
10 CAPSULE, EXT RELEASE 24 HR ORAL EVERY 4 HOURS
Refills: 0 | Status: DISCONTINUED | OUTPATIENT
Start: 2021-03-13 | End: 2021-03-16

## 2021-03-13 RX ORDER — PANTOPRAZOLE SODIUM 20 MG/1
40 TABLET, DELAYED RELEASE ORAL
Refills: 0 | Status: DISCONTINUED | OUTPATIENT
Start: 2021-03-13 | End: 2021-03-16

## 2021-03-13 RX ORDER — METOPROLOL TARTRATE 50 MG
1 TABLET ORAL
Qty: 0 | Refills: 0 | DISCHARGE

## 2021-03-13 RX ORDER — ERYTHROPOIETIN 10000 [IU]/ML
8000 INJECTION, SOLUTION INTRAVENOUS; SUBCUTANEOUS
Refills: 0 | Status: DISCONTINUED | OUTPATIENT
Start: 2021-03-13 | End: 2021-03-16

## 2021-03-13 RX ORDER — FUROSEMIDE 40 MG
60 TABLET ORAL ONCE
Refills: 0 | Status: COMPLETED | OUTPATIENT
Start: 2021-03-13 | End: 2021-03-13

## 2021-03-13 RX ADMIN — PANTOPRAZOLE SODIUM 40 MILLIGRAM(S): 20 TABLET, DELAYED RELEASE ORAL at 16:45

## 2021-03-13 RX ADMIN — Medication 20 MILLIEQUIVALENT(S): at 10:40

## 2021-03-13 RX ADMIN — SEVELAMER CARBONATE 800 MILLIGRAM(S): 2400 POWDER, FOR SUSPENSION ORAL at 10:39

## 2021-03-13 RX ADMIN — ERYTHROPOIETIN 8000 UNIT(S): 10000 INJECTION, SOLUTION INTRAVENOUS; SUBCUTANEOUS at 15:34

## 2021-03-13 RX ADMIN — Medication 500 MILLIGRAM(S): at 10:40

## 2021-03-13 RX ADMIN — Medication 60 MILLIGRAM(S): at 01:05

## 2021-03-13 RX ADMIN — GABAPENTIN 100 MILLIGRAM(S): 400 CAPSULE ORAL at 21:18

## 2021-03-13 RX ADMIN — ATORVASTATIN CALCIUM 40 MILLIGRAM(S): 80 TABLET, FILM COATED ORAL at 21:18

## 2021-03-13 RX ADMIN — Medication 25 MILLIGRAM(S): at 18:41

## 2021-03-13 RX ADMIN — LEVETIRACETAM 750 MILLIGRAM(S): 250 TABLET, FILM COATED ORAL at 05:57

## 2021-03-13 RX ADMIN — SEVELAMER CARBONATE 800 MILLIGRAM(S): 2400 POWDER, FOR SUSPENSION ORAL at 16:45

## 2021-03-13 RX ADMIN — PANTOPRAZOLE SODIUM 40 MILLIGRAM(S): 20 TABLET, DELAYED RELEASE ORAL at 01:06

## 2021-03-13 RX ADMIN — LEVETIRACETAM 750 MILLIGRAM(S): 250 TABLET, FILM COATED ORAL at 16:45

## 2021-03-13 NOTE — PROGRESS NOTE ADULT - PROBLEM SELECTOR PLAN 1
Patient to get 3 rd unti PRBC during dialysis   check post transfusion cbc  cleared by cardiology for colon monday.  I spoke to renal ok to do colonoscopy monday as long as pt is not hyperkalemic on monday

## 2021-03-13 NOTE — PROGRESS NOTE ADULT - SUBJECTIVE AND OBJECTIVE BOX
Gastrointestinal hemorrhage        HPI:  60yoM extensive PMHx including ESRD on HD on Tu/Thur/Sat (since ~10/2020) s/p LUE AVF (1/2021), CAD s/p remote PCI, AT/Aflutter, aortic insufficiency and mitral regurgitation s/p bioAVR and bioMVR, CABG(11/2020), on coumadin, HFrEF (EF 40%), ascending aortic dissection s/p surgery (~2010) complicated by CVA with residual left-sided weakness, colon resection for bowel resection s/p ostomy with reversal, also hx HTN, seizure disorder, psoriasis s/p Humira injections, remote hx of cocaine/EtOH abuse, who was sent to hospital after dialysis center labs taken a few days ago revealed anemia.  On admission, pt found to have Hgb of 4.4 (baseline Hgb 7-9) with positive FOBT.  Pt has been having intermittent painless hematochezia for several months with some occasional dark stools including episodes of this during his previous hospitalization in January with worsening anemia.  During that time, he was seen by GI and per documentation, pt declined inpatient colonoscopy opted for hemorrhoid rectal cream and to follow up regarding outpatient colonoscopy.  Pt reports remote hx of endoscopy and colonoscopy which he says was done for ‘corn stuck in his colon’, but pt unable to remember what the reports showed and states he wasn't told about anything serious although these were done prior to his colon surgeries.  Pt has not had endoscopy or colonoscopy in decades and has not had any outpatient GI evaluation since onset of his GI bleeding. Pt reports being hesitant due to his complicated anatomy given his hx of bowel resection and prior ostomy.  Pt also reports episodes of prolonged bleeding from his AVF, lasting about 30 minutes at a time during his HD sessions. Pt reports some generalized weakness dizziness and exertional dyspnea over past few days.    Interval History:  Patient was seen and examined while getting dialysis.   Had an episode of bleeding per rectum earlier this morning.   Denies chest pain, palpitations, shortness of breath, headache, dizziness, visual symptoms, nausea, vomiting or abdominal pain.    ROS:  As per interval history otherwise unremarkable.    PHYSICAL EXAM:  Vital Signs   T(C): 36.5 (13 Mar 2021 12:15), Max: 37.2 (13 Mar 2021 02:00)  T(F): 97.7 (13 Mar 2021 12:15), Max: 99 (13 Mar 2021 02:00)  HR: 121 (13 Mar 2021 12:15) (84 - 123)  BP: 147/76 (13 Mar 2021 12:15) (103/66 - 147/76)  RR: 18 (13 Mar 2021 12:15) (14 - 20)  SpO2: 100% (13 Mar 2021 12:15) (98% - 100%)  General: Well developed. Well nourished. No acute distress  HEENT: EOMI. Clear conjunctivae. Moist mucus membrane  Neck: Supple.   Chest: CTA bilaterally. No wheezing, rales or rhonchi.   Heart: Normal S1 & S2. RRR.   Abdomen: Soft. Non-tender. Non-distended. + BS. Old healed scars.   Ext: No pedal edema. No calf tenderness. AVF in LUE.   Neuro: AAO x 3. No focal deficit. No speech disorder  Skin: Warm and Dry  Psychiatry: Normal mood and affect    MEDICATIONS  (STANDING):  ascorbic acid 500 milliGRAM(s) Oral daily  atorvastatin 40 milliGRAM(s) Oral at bedtime  epoetin yolanda-epbx (RETACRIT) Injectable 8000 Unit(s) IV Push <User Schedule>  gabapentin 100 milliGRAM(s) Oral at bedtime  levETIRAcetam 750 milliGRAM(s) Oral two times a day  metoprolol succinate ER 25 milliGRAM(s) Oral <User Schedule>  metoprolol tartrate 25 milliGRAM(s) Oral <User Schedule>  pantoprazole  Injectable 40 milliGRAM(s) IV Push two times a day  sevelamer carbonate 800 milliGRAM(s) Oral two times a day with meals    LABS:                        7.5    7.73  )-----------( 169      ( 13 Mar 2021 06:32 )             23.3     03-13    141  |  96<L>  |  28.0<H>  ----------------------------<  93  3.3<L>   |  28.0  |  3.76<H>    Ca    8.7      13 Mar 2021 06:32  Phos  4.6     03-13  Mg     1.9     03-13    TPro  x   /  Alb  4.1  /  TBili  x   /  DBili  x   /  AST  x   /  ALT  x   /  AlkPhos  x   03-13    PT/INR - ( 12 Mar 2021 17:24 )   PT: 34.8 sec;   INR: 3.17 ratio       PTT - ( 12 Mar 2021 17:24 )  PTT:41.3 sec    RADIOLOGY & ADDITIONAL STUDIES:  Reviewed        Gastrointestinal hemorrhage    HPI:  60yoM extensive PMHx including ESRD on HD on Tu/Thur/Sat (since ~10/2020) s/p LUE AVF (1/2021), CAD s/p remote PCI, AT/Aflutter, aortic insufficiency and mitral regurgitation s/p bioAVR and bioMVR, CABG(11/2020), on coumadin, HFrEF (EF 40%), ascending aortic dissection s/p surgery (~2010) complicated by CVA with residual left-sided weakness, colon resection for bowel resection s/p ostomy with reversal, also hx HTN, seizure disorder, psoriasis s/p Humira injections, remote hx of cocaine/EtOH abuse, who was sent to hospital after dialysis center labs taken a few days ago revealed anemia.  On admission, pt found to have Hgb of 4.4 (baseline Hgb 7-9) with positive FOBT.  Pt has been having intermittent painless hematochezia for several months with some occasional dark stools including episodes of this during his previous hospitalization in January with worsening anemia.  During that time, he was seen by GI and per documentation, pt declined inpatient colonoscopy opted for hemorrhoid rectal cream and to follow up regarding outpatient colonoscopy.  Pt reports remote hx of endoscopy and colonoscopy which he says was done for ‘corn stuck in his colon’, but pt unable to remember what the reports showed and states he wasn't told about anything serious although these were done prior to his colon surgeries.  Pt has not had endoscopy or colonoscopy in decades and has not had any outpatient GI evaluation since onset of his GI bleeding. Pt reports being hesitant due to his complicated anatomy given his hx of bowel resection and prior ostomy.  Pt also reports episodes of prolonged bleeding from his AVF, lasting about 30 minutes at a time during his HD sessions. Pt reports some generalized weakness dizziness and exertional dyspnea over past few days.    Interval History:  Patient was seen and examined while getting dialysis.   Had an episode of bleeding per rectum earlier this morning.   Denies chest pain, palpitations, shortness of breath, headache, dizziness, visual symptoms, nausea, vomiting or abdominal pain.    ROS:  As per interval history otherwise unremarkable.    PHYSICAL EXAM:  Vital Signs   T(C): 36.5 (13 Mar 2021 12:15), Max: 37.2 (13 Mar 2021 02:00)  T(F): 97.7 (13 Mar 2021 12:15), Max: 99 (13 Mar 2021 02:00)  HR: 121 (13 Mar 2021 12:15) (84 - 123)  BP: 147/76 (13 Mar 2021 12:15) (103/66 - 147/76)  RR: 18 (13 Mar 2021 12:15) (14 - 20)  SpO2: 100% (13 Mar 2021 12:15) (98% - 100%)  General: Well developed. Well nourished. No acute distress  HEENT: EOMI. Clear conjunctivae. Moist mucus membrane  Neck: Supple.   Chest: CTA bilaterally. No wheezing, rales or rhonchi.   Heart: Normal S1 & S2. RRR.   Abdomen: Soft. Non-tender. Non-distended. + BS. Old healed scars.   Ext: No pedal edema. No calf tenderness. AVF in LUE.   Neuro: AAO x 3. No focal deficit. No speech disorder  Skin: Warm and Dry  Psychiatry: Normal mood and affect    MEDICATIONS  (STANDING):  ascorbic acid 500 milliGRAM(s) Oral daily  atorvastatin 40 milliGRAM(s) Oral at bedtime  epoetin yolanda-epbx (RETACRIT) Injectable 8000 Unit(s) IV Push <User Schedule>  gabapentin 100 milliGRAM(s) Oral at bedtime  levETIRAcetam 750 milliGRAM(s) Oral two times a day  metoprolol succinate ER 25 milliGRAM(s) Oral <User Schedule>  metoprolol tartrate 25 milliGRAM(s) Oral <User Schedule>  pantoprazole  Injectable 40 milliGRAM(s) IV Push two times a day  sevelamer carbonate 800 milliGRAM(s) Oral two times a day with meals    LABS:                        7.5    7.73  )-----------( 169      ( 13 Mar 2021 06:32 )             23.3     03-13    141  |  96<L>  |  28.0<H>  ----------------------------<  93  3.3<L>   |  28.0  |  3.76<H>    Ca    8.7      13 Mar 2021 06:32  Phos  4.6     03-13  Mg     1.9     03-13    TPro  x   /  Alb  4.1  /  TBili  x   /  DBili  x   /  AST  x   /  ALT  x   /  AlkPhos  x   03-13    PT/INR - ( 12 Mar 2021 17:24 )   PT: 34.8 sec;   INR: 3.17 ratio       PTT - ( 12 Mar 2021 17:24 )  PTT:41.3 sec    RADIOLOGY & ADDITIONAL STUDIES:  Reviewed

## 2021-03-13 NOTE — CONSULT NOTE ADULT - ASSESSMENT
60yoM extensive PMHx including ESRD on HD (since ~10/2020) s/p LUE AVF (1/2021), CAD s/p remote PCI/GABG, AT/Aflutter, aortic insufficiency and mitral regurgitation s/p bioAVR and bioMVR (11/2020), on coumadin, HFrEF (EF 40%), ascending aortic dissection s/p surgery (~2010) complicated by CVA with residual left-sided weakness, colon resection for bowel resection s/p ostomy with reversal, also hx HTN, seizure disorder, psoriasis s/p Humira injections who was sent to hospital after dialysis center labs taken a few days ago revealed anemia, on admission found to have Hgb of 4.4, +FOBT    Acute on chronic blood loss anemia   -Multifactorial from GI bleeding (intermittent hematochezia and occasional melena) and bleeding from AVF intra-HD sessions  -Pt still reports having active hematochezia in ED  -Admission Hgb 4.4, baseline Hgb 7-9, BP stable thus far  -Seen by GI, recommended 3pRBC, 1 pRBC ordered by ED  -Plan to order 2nd pRBC if pt not having dyspnea or hypoxia  -Will order IV Lasix 60mg x1 pre 2nd pRBC as pt still makes urine  -Post transfusion CBC (after 2nd unit) order for around 6AM, 2hr after 2nd pRBC expected to finish  -INR 3.17, will not reverse given hx bioMVR with prior CVA, will allow to natural INR decline  -Hold ASA and coumadin  -Trend CBC q6-8hr, maintain type and screen  -Telemetry and  monitoring    Lower GI bleeding  -Pt with several month hx of intermittent painless, hematochezia  -CT w/out active GI bleed (limited as done w/out contrast) showing colectomy with ileorectal anastomosis, no diverticulosis noted, possible occult anastomotic bleeding     -IV protonix 40mg BID given occasional melena  -Renal diet for now as pt requires further medical optimization prior to any procedures    Hypokalemia  -K+ 3.2, repleted with PO K+ 40mEq (only 1 dose given to minimize risk of high K+ since ESRD)    ESRD on HD ( [facility] )     Hx Afib and valvular disease s/p bioprosthetic AVR and MVR  -INR 3.17, holding coumadin in setting of acute blood loss anemia as above  -Trend INR  -Metoprolol resumed (varying doses on HD vs. non-HD days)    Hx CVA   -On ASA and coumadin, holding in setting of GI bleeding  -On gabapentin for residual LUE neuropathic pain     Hx CAD  -Holding ASA due to GI bleeding  -Statin resumed    Hx seizure disorder  -Keppra resumed    Hx HTN  -Metoprolol resumed      	Repeat Hgb 7.5, improved from 4.4.  Went to bedside to assess pt.  He has been ambulating to bathroom, still having hematochezia but reporting improving in his generalized weakness, exertional dyspnea that he had prior to coming to the hospital, likely all related to symptomatic anemia.  He denies any dyspnea currently, is satting well on room air.  On monitor, pt HR oscillates from high 90s to high 110s.      	Pt was followed by [facility] during 11/2020 hospitalization and had a note from [facility] cardiology in 1/2021.      HD This AM ( No Heparin, Dialysate K + 3.0 Meq., )     CM,     Transfuse again to Maintain Hgb .,> 8 gms.,     Resume EPO,     GI W.U.    SBE Prophylaxis - Pre Colonoscopy  ( Cardiology )    60yoM extensive PMHx including ESRD on HD (since ~10/2020) s/p LUE AVF (1/2021), CAD s/p remote PCI/GABG, AT/Aflutter, aortic insufficiency and mitral regurgitation s/p bioAVR and bioMVR (11/2020), on coumadin, HFrEF (EF 40%), ascending aortic dissection s/p surgery (~2010) complicated by CVA with residual left-sided weakness, colon resection for bowel resection s/p ostomy with reversal, also hx HTN, seizure disorder, psoriasis s/p Humira injections who was sent to hospital after dialysis center labs taken a few days ago revealed anemia, on admission found to have Hgb of 4.4, +FOBT    Acute on chronic blood loss anemia   -Multifactorial from GI bleeding (intermittent hematochezia and occasional melena) and bleeding from AVF intra-HD sessions  -Pt still reports having active hematochezia in ED  -Admission Hgb 4.4, baseline Hgb 7-9, BP stable thus far  -Seen by GI, recommended 3pRBC, 1 pRBC ordered by ED  -Plan to order 2nd pRBC if pt not having dyspnea or hypoxia  -Will order IV Lasix 60mg x1 pre 2nd pRBC as pt still makes urine  -Post transfusion CBC (after 2nd unit) order for around 6AM, 2hr after 2nd pRBC expected to finish  -INR 3.17, will not reverse given hx bioMVR with prior CVA, will allow to natural INR decline  -Hold ASA and coumadin  -Trend CBC q6-8hr, maintain type and screen  -Telemetry and  monitoring    Lower GI bleeding  -Pt with several month hx of intermittent painless, hematochezia  -CT w/out active GI bleed (limited as done w/out contrast) showing colectomy with ileorectal anastomosis, no diverticulosis noted, possible occult anastomotic bleeding     -IV protonix 40mg BID given occasional melena  -Renal diet for now as pt requires further medical optimization prior to any procedures    Hypokalemia  -K+ 3.2, repleted with PO K+ 40mEq (only 1 dose given to minimize risk of high K+ since ESRD)    ESRD on HD ( [facility] )     Hx Afib and valvular disease s/p bioprosthetic AVR and MVR  -INR 3.17, holding coumadin in setting of acute blood loss anemia as above  -Trend INR  -Metoprolol resumed (varying doses on HD vs. non-HD days)    Hx CVA   -On ASA and coumadin, holding in setting of GI bleeding  -On gabapentin for residual LUE neuropathic pain     Hx CAD  -Holding ASA due to GI bleeding  -Statin resumed    Hx seizure disorder  -Keppra resumed    Hx HTN  -Metoprolol resumed      	Repeat Hgb 7.5, improved from 4.4.  Went to bedside to assess pt.  He has been ambulating to bathroom, still having hematochezia but reporting improving in his generalized weakness, exertional dyspnea that he had prior to coming to the hospital, likely all related to symptomatic anemia.  He denies any dyspnea currently, is satting well on room air.  On monitor, pt HR oscillates from high 90s to high 110s.      	Pt was followed by [facility] during 11/2020 hospitalization and had a note from [facility] cardiology in 1/2021.      HD This AM ( No Heparin, Dialysate K + 3.0 Meq., )     CM,     Transfuse again to Maintain Hgb .,> 8 gms.,     Resume EPO,     GI W.U.    No Need for SBE Prophylaxis - Colonoscopy  On Monday ( Cardiology )  D/W Dr. Garzon,

## 2021-03-13 NOTE — CONSULT NOTE ADULT - SUBJECTIVE AND OBJECTIVE BOX
[facility]                                                       [facility]                                                               Office:  [address] [address]                                                              Telephone: [phone]. Fax:[phone]                                                                        CARDIOLOGY CONSULTATION NOTE                                                                                             Consult requested by:  Dr. Miller  Reason for Consultation: GIB  History obtained by: Patient and medical record   obtained: No  Cardiologist:    Chief complaint:    Patient is a 60y old  Male who presents with a chief complaint of acute blood loss anemia, GI bleeding (13 Mar 2021 08:32)      HPI:  60yoM extensive PMHx including ESRD on HD on Tu/Thur/Sat (since ~10/2020) s/p LUE AVF (1/2021), CAD s/p remote PCI, AT/Aflutter, aortic insufficiency and mitral regurgitation s/p bioAVR and bioMVR, CABG(11/2020), on coumadin, HF recovered EF (EF 45-50%), ascending aortic dissection s/p surgery (~2010) complicated by CVA with residual left-sided weakness, colon resection for bowel resection s/p ostomy with reversal, HTN, seizure disorder, psoriasis s/p Humira injections, remote hx of cocaine/EtOH abuse, who was sent to hospital after dialysis center labs taken a few days ago revealed anemia.  pt found to have Hgb of 4.4 (baseline Hgb 7-9) with positive FOBT. Pt states bright red blood in stools for several months, "comes and goes, so I dont see anyone because it would stop", has been consistent over past few months. c/o some dyspnea with exertion, dizziness, palpitations.  Denies abd pain, nausea, vomiting, chest pain, syncope, loss of appetite        REVIEW OF SYMPTOMS:   CONSTITUTIONAL: No fever, no weight loss, no fatigue, no weight gain   ENMT:  No difficulty hearing, tinnitus, vertigo; No sinus or throat pain  NECK: No pain or stiffness  CARDIOVASCULAR: No chest pain, no dyspnea, no syncope, + palpitations, + dizziness, no Orthopnea, no Paroxsymal nocturnal dyspnea  RESPIRATORY: No Dyspnea on exertion, no Shortness of breath, no cough, no wheezing  : No dysuria, no hematuria   GI: No dark color stool, + melena, no diarrhea, no constipation, no abdominal pain   NEURO: No headache, no dizziness, no slurred speech   MUSCULOSKELETAL: No joint pain or swelling; No muscle, back, or extremity pain  PSYCH: No agitation, no anxiety.    ALL OTHER REVIEW OF SYSTEMS ARE NEGATIVE.      PREVIOUS DIAGNOSTIC TESTING  ECHO FINDINGS:  2/2021  EF 45-50%- mild decreased segmental LV systolic function. basal anteroseptal segment akinetic. mid anteroseptal segment and basal inferoseptal segment hypokinetic. bioprosethetic mitral valve,  and aortic valve. moderate TR    < end of copied text >    CATHETERIZATION FINDINGS:   < from: Cardiac Cath Lab - Adult (11.10.20 @ 07:43) >  VENTRICLES: EF estimated was 50 %.  VALVES: AORTIC VALVE: There was moderate to severe aortic insufficiency.  MITRAL VALVE: The mitral valve exhibited moderate to severe regurgitation.  CORONARY VESSELS: The coronary circulation is right dominant.  LM:   --  LM: The left anterior descending and circumflex arteries arose  anomalously from separate ostia.  LAD:   --  LAD: Angiography showed moderate atherosclerosis.  CX:   --  Proximal circumflex: There was a discrete 60 % stenosis.  --  OM1: The vessel was small to medium sized. There was a discrete 80 %  stenosis. The lesion was irregularly contoured and eccentric.  --  OM2: The vessel was medium to large sized. There was a discrete 80 %  stenosis. The lesion was irregularly contoured, eccentric, and heavily  calcified.  RCA:   --  Proximal RCA: There was a 100 % stenosis. This lesion is a  chronic total occlusion. The distal vessel fills via collaterals from the  LCA.  AORTA: The root exhibited dilatation. Ascending aorta: The segment was  normal in size. Graft repair intact. Aortic arch: Normal. Known residual  Type B dissection by CTA. Descending aorta: The segment was normal in  size. Known residual Type B dissection by CTA.  ARCH VESSELS: Innominate: Known residual Type B dissection by CTA. Proximal  left subclavian: Known residual Type B dissection by CTA. LIMA: Normal.  TAMMY: Normal.  COMPLICATIONS: No complications occurred during the cath lab visit.  SUMMARY:  Summary: Addendum 11/11/20: Case reconfirmed to remove Bypass Graph Study,  duplicate Left Subclavian procedure,change contrast from 1665 to 165 and  add Inominate Angiography procedure  DIAGNOSTIC IMPRESSIONS: - Patent ascending aortic graft  - Severe 2 vessel CAD with chronically occluded proximal RCA. There are   brisk collaterals from the LCA.  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 Butler later this week  - [facility] will continue to follow  INTERVENTIONAL IMPRESSIONS: - Patent ascending aortic graft  - Severe 2 vessel CAD with chronically occluded proximal RCA. There are   brisk collaterals from the LCA.  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 Butler later this week  - [facility] will continue to follow  Prepared and signed by  Celso Stockton MD  Signed 11/11/2020 09:33:32    < end of copied text >        ALLERGIES: Allergies  penicillin (Other)  Intolerances        PAST MEDICAL HISTORY  Mitral regurgitation  H/O aortic valve insufficiency  H/O aortic dissection  History of cocaine use  Former smoker  ESRD on dialysis  Atrial fibrillation  COVID-19  Hyperlipemia  HTN (hypertension)  Seizures  Chronic kidney disease  CVA (cerebral vascular accident)  CHF (congestive heart failure)        PAST SURGICAL HISTORY  S/P MVR (mitral valve replacement)  S/P AVR (aortic valve replacement)  Status post double vessel coronary artery bypass  H/O colectomy  Aorta disorder      FAMILY HISTORY:  Family history of cardiac disorder  mother  FH: hypertension      SOCIAL HISTORY:  Former smoker, former cocaine      CURRENT MEDICATIONS:  metoprolol succinate ER 25 milliGRAM(s) Oral <User Schedule>  metoprolol tartrate 25 milliGRAM(s) Oral <User Schedule>    gabapentin  levETIRAcetam  pantoprazole  Injectable  ascorbic acid  atorvastatin  epoetin yolanda-epbx (RETACRIT) Injectable  potassium chloride  Tablet ER  sevelamer carbonate      HOME MEDICATIONS:        Vital Signs Last 24 Hrs  T(C): 36.8 (13 Mar 2021 07:30), Max: 37.2 (13 Mar 2021 02:00)  T(F): 98.3 (13 Mar 2021 07:30), Max: 99 (13 Mar 2021 02:00)  HR: 122 (13 Mar 2021 07:30) (84 - 123)  BP: 129/82 (13 Mar 2021 07:30) (103/66 - 141/72)  RR: 19 (13 Mar 2021 07:30) (14 - 20)  SpO2: 98% (13 Mar 2021 07:30) (98% - 100%)      PHYSICAL EXAM:  Constitutional: Comfortable . No acute distress.   HEENT: Atraumatic and normocephalic , neck is supple . no JVD.   CNS: A&Ox3. No focal deficits. EOMI.   Respiratory: CTAB, unlabored   Cardiovascular: S1S2 irreg irreg. No murmur/rubs or gallop.  Gastrointestinal: Soft non-tender and non distended . +Bowel sounds. negative Garcia's sign.  Extremities: No edema.   Psychiatric: Calm . no agitation.  Skin: No skin rash/ulcers visualized to face, hands or feet.      LABS:                        7.5    7.73  )-----------( 169      ( 13 Mar 2021 06:32 )             23.3     03-13    141  |  96<L>  |  28.0<H>  ----------------------------<  93  3.3<L>   |  28.0  |  3.76<H>    Ca    8.7      13 Mar 2021 06:32  Mg     1.9     03-13    TPro  6.1<L>  /  Alb  3.6  /  TBili  0.6  /  DBili  x   /  AST  16  /  ALT  14  /  AlkPhos  67  03-12      PT/INR - ( 12 Mar 2021 17:24 )   PT: 34.8 sec;   INR: 3.17 ratio    PTT - ( 12 Mar 2021 17:24 )  PTT:41.3 sec      INTERPRETATION OF TELEMETRY: Afib -150, couplets, NSVT 4 beats  ECG:     RADIOLOGY & ADDITIONAL STUDIES:    X-ray:  reviewed by me.       
Patient is a 60y old  Male who presents with a chief complaint of acute blood loss anemia, GI bleeding (12 Mar 2021 23:03)    HPI:  60yoM extensive PMHx including ESRD on HD on Tu/Thur/Sat (since ~10/2020) s/p LUE AVF (1/2021), CAD s/p remote PCI, AT/Aflutter, aortic insufficiency and mitral regurgitation s/p bioAVR and bioMVR, CABG(11/2020), on coumadin, HFrEF (EF 40%), ascending aortic dissection s/p surgery (~2010) complicated by CVA with residual left-sided weakness, colon resection for bowel resection s/p ostomy with reversal, also hx HTN, seizure disorder, psoriasis s/p Humira injections, remote hx of cocaine/EtOH abuse, who was sent to hospital after dialysis center labs taken a few days ago revealed anemia.  On admission, pt found to have Hgb of 4.4 (baseline Hgb 7-9) with positive FOBT.  Pt has been having intermittent painless hematochezia for several months with some occasional dark stools including episodes of this during his previous hospitalization in January with worsening anemia.  During that time, he was seen by GI and per documentation, pt declined inpatient colonoscopy opted for hemorrhoid rectal cream and to follow up regarding outpatient colonoscopy.  Pt reports remote hx of endoscopy and colonoscopy which he says was done for ‘corn stuck in his colon’, but pt unable to remember what the reports showed and states he wasn't told about anything serious although these were done prior to his colon surgeries.  Pt has not had endoscopy or colonoscopy in decades and has not had any outpatient GI evaluation since onset of his GI bleeding. Pt reports being hesitant due to his complicated anatomy given his hx of bowel resection and prior ostomy.  Pt also reports episodes of prolonged bleeding from his AVF, lasting about 30 minutes at a time during his HD sessions. Pt reports some generalized weakness dizziness and exertional dyspnea over past few days.   (12 Mar 2021 23:03)    PAST MEDICAL & SURGICAL HISTORY:  Mitral regurgitation    H/O aortic valve insufficiency    H/O aortic dissection  s/p repair (2010), surgery complicated by bowel ischemia s/p bowel resection and ostomy with reversal    History of cocaine use  remote hx, currently sober    Former smoker    ESRD on dialysis  Tu/Thurs/Sat    Atrial fibrillation    COVID-19 october 2020    Hyperlipemia    HTN (hypertension)    Seizures  last seizure 10 years ago    CVA (cerebral vascular accident)    CHF (congestive heart failure)    S/P MVR (mitral valve replacement)    S/P AVR (aortic valve replacement)    Status post double vessel coronary artery bypass    H/O colectomy    Aorta disorder        FAMILY HISTORY:  Family history of cardiac disorder  mother    FH: hypertension    Social History: Remote Cocaine user,     MEDICATIONS  (STANDING):  ascorbic acid 500 milliGRAM(s) Oral daily  atorvastatin 40 milliGRAM(s) Oral at bedtime  gabapentin 100 milliGRAM(s) Oral at bedtime  levETIRAcetam 750 milliGRAM(s) Oral two times a day  metoprolol succinate ER 25 milliGRAM(s) Oral <User Schedule>  metoprolol tartrate 25 milliGRAM(s) Oral <User Schedule>  pantoprazole  Injectable 40 milliGRAM(s) IV Push two times a day  potassium chloride    Tablet ER 20 milliEquivalent(s) Oral once  sevelamer carbonate 800 milliGRAM(s) Oral two times a day with meals    MEDICATIONS  (PRN):    Allergies    penicillin (Other)    REVIEW OF SYSTEMS:    CONSTITUTIONAL: No fever, weight loss, ++ fatigue  EYES: No eye pain, visual disturbances, or discharge  ENMT:  No difficulty hearing, tinnitus, vertigo; No sinus or throat pain  NECK: No pain or stiffness  RESPIRATORY: No cough, wheezing, chills or hemoptysis; No shortness of breath  CARDIOVASCULAR: No chest pain, palpitations, dizziness, or leg swelling  GASTROINTESTINAL: No abdominal or epigastric pain. No nausea, vomiting, or hematemesis; No diarrhea or constipation. + melena . hematochezia.  GENITOURINARY: No dysuria, frequency, hematuria, or incontinence  NEUROLOGICAL: No headaches, memory loss, loss of strength, numbness, or tremors  SKIN: No itching, burning, rashes, or lesions   LYMPH NODES: No enlarged glands  ENDOCRINE: No heat or cold intolerance; No hair loss  MUSCULOSKELETAL: No joint pain or swelling; No muscle, back, or extremity pain  PSYCHIATRIC: No depression, anxiety, mood swings, or difficulty sleeping  HEME/LYMPH: + easy bruising,  bleeding gums  ALLERGY AND IMMUNOLOGIC: No hives or eczema    Vital Signs Last 24 Hrs  T(C): 36.8 (13 Mar 2021 07:30), Max: 37.2 (13 Mar 2021 02:00)  T(F): 98.3 (13 Mar 2021 07:30), Max: 99 (13 Mar 2021 02:00)  HR: 122 (13 Mar 2021 07:30) (84 - 123)  BP: 129/82 (13 Mar 2021 07:30) (103/66 - 141/72  RR: 19 (13 Mar 2021 07:30) (14 - 20)  SpO2: 98% (13 Mar 2021 07:30) (98% - 100%)    PHYSICAL EXAM:    GENERAL: NAD, well-groomed, well-developed, Pale, Sallow,   HEAD:  Atraumatic, Normocephalic  EYES: EOMI, PERRLA, conjunctiva and sclera clear  ENMT: Moist mucous membranes,   NECK: Supple, No JVD,   NERVOUS SYSTEM:  Alert & Oriented X3, Good concentration;   CHEST/LUNG: Clear to percussion bilaterally; No rales, rhonchi, wheezing, or rubs  HEART: Irrgular rate and rhythm; + Systolic Murmur, No rubs, or gallops  ABDOMEN: Soft, Nontender, Nondistended; Bowel sounds present  EXTREMITIES:  2+ Peripheral Pulses, No clubbing, cyanosis, or edema  LYMPH: No lymphadenopathy noted  SKIN: No rashes or lesions      LABS:                        7.5    7.73  )-----------( 169      ( 13 Mar 2021 06:32 )             23.3     03-13    141  |  96<L>  |  28.0<H>  ----------------------------<  93  3.3<L>   |  28.0  |  3.76<H>    Ca    8.7      13 Mar 2021 06:32  Mg     1.9     03-13    TPro  6.1<L>  /  Alb  3.6  /  TBili  0.6  /  DBili  x   /  AST  16  /  ALT  14  /  AlkPhos  67  03-12    PT/INR - ( 12 Mar 2021 17:24 )   PT: 34.8 sec;   INR: 3.17 ratio       PTT - ( 12 Mar 2021 17:24 )  PTT:41.3 sec    Magnesium, Serum: 1.9 mg/dL (03-13 @ 06:32)    RADIOLOGY & ADDITIONAL TESTS:    CT Abdomen and Pelvis w/ IV Cont (03.12.21 @ 21:59)     EXAM:  CT ABDOMEN AND PELVIS IC                          PROCEDURE DATE:  03/12/2021      INTERPRETATION:  CLINICAL INFORMATION: Acute GI bleed, sudden drop in hemoglobin with anemia    COMPARISON: October 10, 2019    CONTRAST/COMPLICATIONS:  IV Contrast: Omnipaque 350 / 94 cc administered / 6 cc discarded.  Oral Contrast: NONE  Complications: None reported at time of study completion    PROCEDURE:  CT of the Abdomen and Pelvis was performed.  Precontrast, Arterial and Delayed phases were performed.  Sagittal and coronal reformats were performed.    FINDINGS:  LOWER CHEST: Linear atelectasis in the left lung base. Mitral valve replacement.    LIVER: Within normal limits.  BILE DUCTS: Normal caliber  GALLBLADDER: Cholelithiasis.  SPLEEN: Within normal limits.  PANCREAS: Within normal limits.  ADRENALS: Within normal limits.  KIDNEYS/URETERS: Lobular cortex bilaterally. Incidental cyst in the right kidney. No hydronephrosis.    BLADDER: Within normal limits.  REPRODUCTIVE ORGANS: Mild prostatomegaly    BOWEL: Colectomy with ileorectal anastomosis. No bowel obstruction. No abnormal mural thickening in the distended portion of the small bowel. No active GI bleed on this scan.  PERITONEUM: Mildly enlarged periportal and portacaval lymph nodes. No free air or free fluid.  VESSELS: The previously seen type a dissection is redemonstrated, the false lumen remains contrast-filled. The aorta measures 3.4 cm in diameter at the hiatus, unchanged. The infrarenal abdominal aorta measures up to 3 cm in caliber, unchanged. The dissection flap extends into the right external iliac artery and into the proximal left common iliac artery, unchanged.  The superior mesenteric artery arises from the true lumen. There is approximately 30% stenosis in the proximal to mid superior mesenteric artery and noncalcified plaque, similar to the prior.  The right renal artery arises from the true lumen. Left renal artery arises from a combination of the true and false lumen. The dissection flap extends into the origin of the left main renal artery, unchanged. Inferior mesenteric artery arises from the true lumen, demonstrates poor opacification, unchanged.  Portal venous system is patent. Inferior vena cava and iliac veins are patent.  RETROPERITONEUM/LYMPH NODES: No retroperitoneal hemorrhage or inflammation. No enlarged lymph node measuring greater than 10 mm in short axis.  ABDOMINAL WALL: Scarring in the left groin related to prior surgery. Small fat-containing left inguinal hernia.  BONES: Degenerative changes in the spine.    IMPRESSION:    1. No active GI bleed on this scan.  2. Stable aneurysmal dilatation of ascending aorta and stable appearance of the imaged portion of the aortic dissection    BRANDY CROSS MD; Attending Radiologist  This document has been electronically signed. Mar 12 2021 10:14PM    
Patient is a 60y old  Male who presents with a chief complaint of     HPI: Patient with hx of ESRD ( tuesday, thursday, sat), afib on coumadin, CVA, MVR, AVR, HTN, remote hx of seizure. Hx of hemicolectomy after AAA repair ( ? ischemia vs diverticulitis )- had colostomy and reversal . Sent by his nephrologist for anemia. Found to have hemoglobin 4.4 . INR 3.7 Patient has had intermittent rectal bleeding for a least 6 months. Will happen qd for one week , then no bleeding for months. Occurs only with BM. No abdominal pain.  No constipation, no diarrhea. Last colonoscopy was with Dr Feldman about 10 years ago. - no polyps as per pt. No family hx of colon cancer  Pt states he has had rectal bleeding qd X 1 week.     Patient had rectal bleeding  in late  january. At that time, hemoglobin was  9.7 and dropped to 7.2. Pt refused to stay in hospital for colonoscopy and never folllowed up in office.       REVIEW OF SYSTEMS:  Constitutional: No fever, weight loss or fatigue  ENMT:  No difficulty hearing, tinnitus, vertigo; No sinus or throat pain  Respiratory: No cough, wheezing, chills or hemoptysis  Cardiovascular: No chest pain, palpitations, dizziness or leg swelling  Gastrointestinal: No abdominal or epigastric pain. No nausea, vomiting or hematemesis; No diarrhea or constipation.  hematochezia.  Skin: No itching, burning, rashes or lesions   Musculoskeletal: No joint pain or swelling; No muscle, back or extremity pain    PAST MEDICAL & SURGICAL HISTORY:  History of cocaine use  quit &quot;many years ago&quot;    Former smoker    ESRD on dialysis    Atrial fibrillation    COVID-19  october 2020    Hyperlipemia    HTN (hypertension)    Seizures  last seizure 10 years ago    CVA (cerebral vascular accident)    CHF (congestive heart failure)    Status post double vessel coronary artery bypass    H/O colectomy    Aorta disorder        FAMILY HISTORY:  Family history of cardiac disorder  mother    FH: hypertension        SOCIAL HISTORY:  Smoking Status: [ ] Current, [ ] Former, [ ] Never  Pack Years:  [  ] EtOH-no  [  ] IVDA    MEDICATIONS:  MEDICATIONS  (STANDING):  iohexol 300 mG (iodine)/mL Oral Solution 30 milliLiter(s) Oral once    MEDICATIONS  (PRN):      Allergies    penicillin (Unknown)    Intolerances        Vital Signs Last 24 Hrs  T(C): 36.6 (12 Mar 2021 14:47), Max: 36.6 (12 Mar 2021 14:47)  T(F): 97.9 (12 Mar 2021 14:47), Max: 97.9 (12 Mar 2021 14:47)  HR: 92 (12 Mar 2021 18:29) (92 - 123)  BP: 126/65 (12 Mar 2021 18:29) (126/65 - 141/72)  BP(mean): --  RR: 14 (12 Mar 2021 18:29) (14 - 16)  SpO2: 100% (12 Mar 2021 18:29) (100% - 100%)        PHYSICAL EXAM:    General: Well developed; well nourished; in no acute distress  HEENT: MMM, conjunctiva and sclera clear  H- RRR  l- CTA  Gastrointestinal: Soft, non-tender non-distended; Normal bowel sounds; No rebound or guarding  Extremities: Normal range of motion, No clubbing, cyanosis or edema  Neurological: Alert and oriented x3  Skin: Warm and dry. No obvious rash  rectal- brown stool       LABS:                        4.4    6.00  )-----------( 142      ( 12 Mar 2021 17:24 )             14.0     12 Mar 2021 17:24    139    |  96     |  27.0   ----------------------------<  88     3.2     |  29.0   |  3.25     Ca    8.3        12 Mar 2021 17:24    TPro  6.1    /  Alb  3.6    /  TBili  0.6    /  DBili  x      /  AST  16     /  ALT  14     /  AlkPhos  67     / Amylase x      /Lipase x      12 Mar 2021 17:24              RADIOLOGY & ADDITIONAL STUDIES:

## 2021-03-13 NOTE — PROGRESS NOTE ADULT - ASSESSMENT
INCOMPLETE 60 years old male with extensive PMH including ESRD on HD (since ~10/2020) - s/p LUE AVF (1/2021), CAD s/p remote PCI/GABG, AT/A. Flutter, Aortic Insufficiency and Mitral Regurgitation s/p bioAVR and bioMVR (11/2020), on coumadin, HFrEF (EF 40%), Ascending Aortic Dissection s/p surgery (~2010) complicated by CVA with residual left-sided weakness, Colon Resection s/p ostomy with reversal, HTN, Seizure Disorder, Psoriasis s/p Humira injections who was sent to hospital after dialysis center labs taken a few days ago revealed anemia, on admission found to have Hgb of 4.4, +FOBT    1) Acute on chronic blood loss anemia   - Multifactorial from GI bleeding (intermittent hematochezia and occasional melena) and bleeding from AVF intra-HD sessions  - CT w/out active GI bleed (limited as done w/out contrast) showing colectomy with ileorectal anastomosis, no diverticulosis noted, possible occult anastomotic bleeding   - s/p 2 units of PRBCs, will get 3rd unit during HD  - Aspirin and Coumadin on hold  - GI following, plan for colonoscopy on Monday     2) Coagulopathy  - Hold Coumadin  - Monitor INR    3) A. Flutter  - Increase Metoprolol to 25 mg q 8 hours  - Hold Coumadin     4) ESRD on HD (TTS)  - Getting HD today  - Renal consult appreciated    5) Hypokalemia  - Repleted     6) CAD / CVA / HLD / HTN  - Aspirin on hold  - Continue Lipitor and Metoprolol     7) Seizure Disorder  - Continue Keppra     DVT Prophylaxis -- No need for chemical prophylaxis. INR supra-therapeutic.     Dispo: Likely home in 2-3 days.        60 years old male with extensive PMH including ESRD on HD (since ~10/2020) - s/p LUE AVF (1/2021), CAD s/p remote PCI/GABG, AT/A. Flutter, Aortic Insufficiency and Mitral Regurgitation s/p bioAVR and bioMVR (11/2020), on coumadin, HFrEF (EF 40%), Ascending Aortic Dissection s/p surgery (~2010) complicated by CVA with residual left-sided weakness, Colon Resection s/p ostomy with reversal, HTN, Seizure Disorder, Psoriasis s/p Humira injections who was sent to hospital after dialysis center labs taken a few days ago revealed anemia, on admission found to have Hgb of 4.4, +FOBT    1) Acute on chronic blood loss anemia   - Multifactorial from GI bleeding (intermittent hematochezia and occasional melena) and bleeding from AVF intra-HD sessions  - CT with no acute GI bleed   - s/p 2 units of PRBCs, will get 3rd unit during HD  - Aspirin and Coumadin on hold  - GI following, plan for colonoscopy on Monday     2) Coagulopathy  - Hold Coumadin  - Monitor INR    3) A. Flutter  - Increase Metoprolol to 25 mg q 8 hours  - Hold Coumadin     4) ESRD on HD (TTS)  - Getting HD today  - Renal consult appreciated    5) Hypokalemia  - Repleted     6) CAD / CVA / HLD / HTN  - Aspirin on hold  - Continue Lipitor and Metoprolol     7) Seizure Disorder  - Continue Keppra     DVT Prophylaxis -- No need for chemical prophylaxis. INR supra-therapeutic.     Dispo: Likely home in 2-3 days.

## 2021-03-13 NOTE — CONSULT NOTE ADULT - ASSESSMENT
60yoM extensive PMHx including HTN, colon resection for bowel resection s/p ostomy with reversal, Ascending arotic dissection with repair, MVR, AVE, Afib/flutter on coumadin, CABG (LIMA, TAMMY, 100%RCA with collaterals) 11/2020, HF recovered EF (45-50%), COVID, ESRD HD T/Th/Sa (10/2020), LUE AVF. CVA with residual left-sided weakness, seizure disorder, psoriasis s/p Humira injections, remote hx of cocaine/EtOH abuse, sent to hospital for anemia.     Anemia/GIB  - s/p 3 units RBCs  - Gi Following  - plan for colonoscopy  - hold asa, coumadin for now  - no FFP for now, repeat PTT/INR    Pre-operative cardiovascular risk evaluation and management.  - No cardiac symptoms. TTE- recovered EF, no new RWMA on TTE 2/2021, . Ischemic w/u- s/p CABG (LIMA< TAMMY, know % with collaterals).  - RCRI (revised cardiac risk index score) 15%. No further cardiac work up is needed.  Further cardiac work up will not change risk of the patient. Proceed for surgery as indicated.    CAD  - cont metoprolol, statin  - EF 45-50%    Afib/flutter  - coumadin on hold.   - increase metoprolol as needed for rate control goal HR <120    ESRD  - nephrology following     pending attending attestation  full note to follow

## 2021-03-13 NOTE — PROGRESS NOTE ADULT - SUBJECTIVE AND OBJECTIVE BOX
[facility] DIVISION OF KIDNEY DISEASES AND HYPERTENSION -- HEMODIALYSIS NOTE  --------------------------------------------------------------------------------  Chief Complaint: ESRD/Ongoing hemodialysis requirement    24 hour events/subjective:        PAST HISTORY  --------------------------------------------------------------------------------  No significant changes to PMH, PSH, FHx, SHx, unless otherwise noted    ALLERGIES & MEDICATIONS  --------------------------------------------------------------------------------  Allergies    penicillin (Other)    Intolerances      Standing Inpatient Medications  ascorbic acid 500 milliGRAM(s) Oral daily  atorvastatin 40 milliGRAM(s) Oral at bedtime  epoetin yolanda-epbx (RETACRIT) Injectable 8000 Unit(s) IV Push <User Schedule>  gabapentin 100 milliGRAM(s) Oral at bedtime  levETIRAcetam 750 milliGRAM(s) Oral two times a day  metoprolol tartrate 25 milliGRAM(s) Oral every 8 hours  pantoprazole  Injectable 40 milliGRAM(s) IV Push two times a day  potassium chloride    Tablet ER 40 milliEquivalent(s) Oral once  sevelamer carbonate 800 milliGRAM(s) Oral two times a day with meals    PRN Inpatient Medications  diltiazem Injectable 10 milliGRAM(s) IV Push every 4 hours PRN      REVIEW OF SYSTEMS  --------------------------------------------------------------------------------  Gen: No weight changes, fatigue, fevers/chills, weakness  Skin: No rashes  Head/Eyes/Ears/Mouth: No headache; Normal hearing; Normal vision w/o blurriness; No sinus pain/discomfort, sore throat  Respiratory: No dyspnea, cough, wheezing, hemoptysis  CV: No chest pain, PND, orthopnea  GI: No abdominal pain, diarrhea, constipation, nausea, vomiting, melena, hematochezia  : No increased frequency, dysuria, hematuria, nocturia  MSK: No joint pain/swelling; no back pain; no edema  Neuro: No dizziness/lightheadedness, weakness, seizures, numbness, tingling  Heme: No easy bruising or bleeding  Endo: No heat/cold intolerance  Psych: No significant nervousness, anxiety, stress, depression    All other systems were reviewed and are negative, except as noted.    VITALS/PHYSICAL EXAM  --------------------------------------------------------------------------------  T(C): 36.7 (03-14-21 @ 08:43), Max: 37.8 (03-13-21 @ 19:31)  HR: 78 (03-14-21 @ 08:43) (78 - 129)  BP: 108/61 (03-14-21 @ 08:43) (108/61 - 147/76)  RR: 18 (03-14-21 @ 08:43) (18 - 20)  SpO2: 98% (03-14-21 @ 08:43) (92% - 100%)  Wt(kg): --  Height (cm): 175.3 (03-12-21 @ 14:47)  Weight (kg): 83.9 (03-12-21 @ 14:47)  BMI (kg/m2): 27.3 (03-12-21 @ 14:47)  BSA (m2): 2 (03-12-21 @ 14:47)      Physical Exam:  	Gen: NAD, well-appearing  	HEENT: PERRL, supple neck, clear oropharynx  	Pulm: CTA B/L  	CV: RRR, S1S2; no rub  	Back: No spinal or CVA tenderness; no sacral edema  	Abd: +BS, soft, nontender/nondistended  	: No suprapubic tenderness  	UE: Warm, FROM, no clubbing, intact strength; no edema; no asterixis  	LE: Warm, FROM, no clubbing, intact strength; no edema  	Neuro: No focal deficits, intact gait  	Psych: Normal affect and mood  	Skin: Warm, without rashes  	Vascular access:    LABS/STUDIES  --------------------------------------------------------------------------------              8.0    6.90  >-----------<  147      [03-14-21 @ 11:06]              25.5     140  |  99  |  30.0  ----------------------------<  96      [03-14-21 @ 11:06]  3.4   |  29.0  |  3.35        Ca     8.7     [03-14-21 @ 11:06]      Mg     1.8     [03-14-21 @ 08:46]      Phos  3.5     [03-14-21 @ 08:46]    TPro  6.3  /  Alb  3.5  /  TBili  1.1  /  DBili  x   /  AST  11  /  ALT  10  /  AlkPhos  69  [03-14-21 @ 11:06]    PT/INR: PT 24.6 , INR 2.20       [03-14-21 @ 11:06]  PTT: 36.5       [03-14-21 @ 11:06]      Iron 29, TIBC 227, %sat 13      [12-05-20 @ 08:05]  Ferritin 1099      [12-05-20 @ 08:05]  TSH 1.95      [11-03-20 @ 08:18]  Lipid: chol --, , HDL --, LDL --      [11-15-20 @ 02:40]

## 2021-03-13 NOTE — PROGRESS NOTE ADULT - SUBJECTIVE AND OBJECTIVE BOX
Patient is a 60y old  Male who presents with a chief complaint of acute blood loss anemia, GI bleeding (13 Mar 2021 12:44)      HPI:  60yoM extensive PMHx including ESRD on HD on Tu/Thur/Sat (since ~10/2020) s/p LUE AVF (1/2021), CAD s/p remote PCI, AT/Aflutter, aortic insufficiency and mitral regurgitation s/p bioAVR and bioMVR, CABG(11/2020), on coumadin, HFrEF (EF 40%), ascending aortic dissection s/p surgery (~2010) complicated by CVA with residual left-sided weakness, colon resection for bowel resection s/p ostomy with reversal, also hx HTN, seizure disorder, psoriasis s/p Humira injections, remote hx of cocaine/EtOH abuse, who was sent to hospital after dialysis center labs taken a few days ago revealed anemia.        Patient  states he had rectal bleeding overnight.  Hgb 7.3. Seen in dialysis. No abdominal pain    REVIEW OF SYSTEMS:  Constitutional: No fever, weight loss or fatigue  ENMT:  No difficulty hearing, tinnitus, vertigo; No sinus or throat pain  Respiratory: No cough, wheezing, chills or hemoptysis  Cardiovascular: No chest pain, palpitations, dizziness or leg swelling  Gastrointestinal: No abdominal or epigastric pain. No nausea, vomiting or hematemesis; No diarrhea or constipation. No melena or hematochezia.  Skin: No itching, burning, rashes or lesions   Musculoskeletal: No joint pain or swelling; No muscle, back or extremity pain    PAST MEDICAL & SURGICAL HISTORY:  Mitral regurgitation    H/O aortic valve insufficiency    H/O aortic dissection  s/p repair (2010), surgery complicated by bowel ischemia s/p bowel resection and ostomy with reversal    History of cocaine use  remote hx, currently sober    Former smoker    ESRD on dialysis  Tu/Thurs/Sat    Atrial fibrillation    COVID-19 october 2020    Hyperlipemia    HTN (hypertension)    Seizures  last seizure 10 years ago    CVA (cerebral vascular accident)    CHF (congestive heart failure)    S/P MVR (mitral valve replacement)    S/P AVR (aortic valve replacement)    Status post double vessel coronary artery bypass    H/O colectomy    Aorta disorder        FAMILY HISTORY:  Family history of cardiac disorder  mother    FH: hypertension        SOCIAL HISTORY:  Smoking Status: [ ] Current, [ ] Former, [ ] Never  Pack Years:  [  ] EtOH  [  ] IVDA    MEDICATIONS:  MEDICATIONS  (STANDING):  ascorbic acid 500 milliGRAM(s) Oral daily  atorvastatin 40 milliGRAM(s) Oral at bedtime  epoetin yolanda-epbx (RETACRIT) Injectable 8000 Unit(s) IV Push <User Schedule>  gabapentin 100 milliGRAM(s) Oral at bedtime  levETIRAcetam 750 milliGRAM(s) Oral two times a day  metoprolol succinate ER 25 milliGRAM(s) Oral <User Schedule>  metoprolol tartrate 25 milliGRAM(s) Oral <User Schedule>  pantoprazole  Injectable 40 milliGRAM(s) IV Push two times a day  sevelamer carbonate 800 milliGRAM(s) Oral two times a day with meals    MEDICATIONS  (PRN):      Allergies    penicillin (Other)    Intolerances        Vital Signs Last 24 Hrs  T(C): 36.5 (13 Mar 2021 12:15), Max: 37.2 (13 Mar 2021 02:00)  T(F): 97.7 (13 Mar 2021 12:15), Max: 99 (13 Mar 2021 02:00)  HR: 121 (13 Mar 2021 12:15) (84 - 123)  BP: 147/76 (13 Mar 2021 12:15) (103/66 - 147/76)  BP(mean): --  RR: 18 (13 Mar 2021 12:15) (14 - 20)  SpO2: 100% (13 Mar 2021 12:15) (98% - 100%)        PHYSICAL EXAM:    General: Well developed; well nourished; in no acute distress  HEENT: MMM, conjunctiva and sclera clear  Gastrointestinal: Soft, non-tender non-distended; Normal bowel sounds; No rebound or guarding  Extremities: Normal range of motion, No clubbing, cyanosis or edema  Neurological: Alert and oriented x3  Skin: Warm and dry. No obvious rash      LABS:                        7.5    7.73  )-----------( 169      ( 13 Mar 2021 06:32 )             23.3     13 Mar 2021 06:32    141    |  96     |  28.0   ----------------------------<  93     3.3     |  28.0   |  3.76     Ca    8.7        13 Mar 2021 06:32  Phos  4.6       13 Mar 2021 06:32  Mg     1.9       13 Mar 2021 06:32    TPro  x      /  Alb  4.1    /  TBili  x      /  DBili  x      /  AST  x      /  ALT  x      /  AlkPhos  x      / Amylase x      /Lipase x      13 Mar 2021 06:32              RADIOLOGY & ADDITIONAL STUDIES:

## 2021-03-14 LAB
ALBUMIN SERPL ELPH-MCNC: 3.5 G/DL — SIGNIFICANT CHANGE UP (ref 3.3–5.2)
ALP SERPL-CCNC: 69 U/L — SIGNIFICANT CHANGE UP (ref 40–120)
ALT FLD-CCNC: 10 U/L — SIGNIFICANT CHANGE UP
ANION GAP SERPL CALC-SCNC: 12 MMOL/L — SIGNIFICANT CHANGE UP (ref 5–17)
ANION GAP SERPL CALC-SCNC: 14 MMOL/L — SIGNIFICANT CHANGE UP (ref 5–17)
APTT BLD: 36.5 SEC — HIGH (ref 27.5–35.5)
AST SERPL-CCNC: 11 U/L — SIGNIFICANT CHANGE UP
BILIRUB SERPL-MCNC: 1.1 MG/DL — SIGNIFICANT CHANGE UP (ref 0.4–2)
BUN SERPL-MCNC: 28 MG/DL — HIGH (ref 8–20)
BUN SERPL-MCNC: 30 MG/DL — HIGH (ref 8–20)
CALCIUM SERPL-MCNC: 8.5 MG/DL — LOW (ref 8.6–10.2)
CALCIUM SERPL-MCNC: 8.7 MG/DL — SIGNIFICANT CHANGE UP (ref 8.6–10.2)
CHLORIDE SERPL-SCNC: 97 MMOL/L — LOW (ref 98–107)
CHLORIDE SERPL-SCNC: 99 MMOL/L — SIGNIFICANT CHANGE UP (ref 98–107)
CO2 SERPL-SCNC: 29 MMOL/L — SIGNIFICANT CHANGE UP (ref 22–29)
CO2 SERPL-SCNC: 30 MMOL/L — HIGH (ref 22–29)
CREAT SERPL-MCNC: 3.18 MG/DL — HIGH (ref 0.5–1.3)
CREAT SERPL-MCNC: 3.35 MG/DL — HIGH (ref 0.5–1.3)
GLUCOSE SERPL-MCNC: 82 MG/DL — SIGNIFICANT CHANGE UP (ref 70–99)
GLUCOSE SERPL-MCNC: 96 MG/DL — SIGNIFICANT CHANGE UP (ref 70–99)
HCT VFR BLD CALC: 24.6 % — LOW (ref 39–50)
HCT VFR BLD CALC: 25.5 % — LOW (ref 39–50)
HGB BLD-MCNC: 7.9 G/DL — LOW (ref 13–17)
HGB BLD-MCNC: 8 G/DL — LOW (ref 13–17)
INR BLD: 2.2 RATIO — HIGH (ref 0.88–1.16)
MAGNESIUM SERPL-MCNC: 1.8 MG/DL — SIGNIFICANT CHANGE UP (ref 1.6–2.6)
MCHC RBC-ENTMCNC: 29.6 PG — SIGNIFICANT CHANGE UP (ref 27–34)
MCHC RBC-ENTMCNC: 30.5 PG — SIGNIFICANT CHANGE UP (ref 27–34)
MCHC RBC-ENTMCNC: 31.4 GM/DL — LOW (ref 32–36)
MCHC RBC-ENTMCNC: 32.1 GM/DL — SIGNIFICANT CHANGE UP (ref 32–36)
MCV RBC AUTO: 94.4 FL — SIGNIFICANT CHANGE UP (ref 80–100)
MCV RBC AUTO: 95 FL — SIGNIFICANT CHANGE UP (ref 80–100)
PHOSPHATE SERPL-MCNC: 3.5 MG/DL — SIGNIFICANT CHANGE UP (ref 2.4–4.7)
PLATELET # BLD AUTO: 138 K/UL — LOW (ref 150–400)
PLATELET # BLD AUTO: 147 K/UL — LOW (ref 150–400)
POTASSIUM SERPL-MCNC: 3.2 MMOL/L — LOW (ref 3.5–5.3)
POTASSIUM SERPL-MCNC: 3.4 MMOL/L — LOW (ref 3.5–5.3)
POTASSIUM SERPL-SCNC: 3.2 MMOL/L — LOW (ref 3.5–5.3)
POTASSIUM SERPL-SCNC: 3.4 MMOL/L — LOW (ref 3.5–5.3)
PROT SERPL-MCNC: 6.3 G/DL — LOW (ref 6.6–8.7)
PROTHROM AB SERPL-ACNC: 24.6 SEC — HIGH (ref 10.6–13.6)
RBC # BLD: 2.59 M/UL — LOW (ref 4.2–5.8)
RBC # BLD: 2.7 M/UL — LOW (ref 4.2–5.8)
RBC # FLD: 16 % — HIGH (ref 10.3–14.5)
RBC # FLD: 16.2 % — HIGH (ref 10.3–14.5)
SODIUM SERPL-SCNC: 140 MMOL/L — SIGNIFICANT CHANGE UP (ref 135–145)
SODIUM SERPL-SCNC: 141 MMOL/L — SIGNIFICANT CHANGE UP (ref 135–145)
WBC # BLD: 6.9 K/UL — SIGNIFICANT CHANGE UP (ref 3.8–10.5)
WBC # BLD: 7.12 K/UL — SIGNIFICANT CHANGE UP (ref 3.8–10.5)
WBC # FLD AUTO: 6.9 K/UL — SIGNIFICANT CHANGE UP (ref 3.8–10.5)
WBC # FLD AUTO: 7.12 K/UL — SIGNIFICANT CHANGE UP (ref 3.8–10.5)

## 2021-03-14 PROCEDURE — 99233 SBSQ HOSP IP/OBS HIGH 50: CPT

## 2021-03-14 RX ORDER — MAGNESIUM OXIDE 400 MG ORAL TABLET 241.3 MG
400 TABLET ORAL
Refills: 0 | Status: DISCONTINUED | OUTPATIENT
Start: 2021-03-14 | End: 2021-03-16

## 2021-03-14 RX ORDER — POTASSIUM CHLORIDE 20 MEQ
40 PACKET (EA) ORAL ONCE
Refills: 0 | Status: COMPLETED | OUTPATIENT
Start: 2021-03-14 | End: 2021-03-14

## 2021-03-14 RX ADMIN — SEVELAMER CARBONATE 800 MILLIGRAM(S): 2400 POWDER, FOR SUSPENSION ORAL at 08:18

## 2021-03-14 RX ADMIN — PANTOPRAZOLE SODIUM 40 MILLIGRAM(S): 20 TABLET, DELAYED RELEASE ORAL at 16:53

## 2021-03-14 RX ADMIN — Medication 500 MILLIGRAM(S): at 08:18

## 2021-03-14 RX ADMIN — Medication 25 MILLIGRAM(S): at 05:50

## 2021-03-14 RX ADMIN — Medication 40 MILLIEQUIVALENT(S): at 23:28

## 2021-03-14 RX ADMIN — SEVELAMER CARBONATE 800 MILLIGRAM(S): 2400 POWDER, FOR SUSPENSION ORAL at 16:53

## 2021-03-14 RX ADMIN — GABAPENTIN 100 MILLIGRAM(S): 400 CAPSULE ORAL at 23:28

## 2021-03-14 RX ADMIN — Medication 25 MILLIGRAM(S): at 23:28

## 2021-03-14 RX ADMIN — MAGNESIUM OXIDE 400 MG ORAL TABLET 400 MILLIGRAM(S): 241.3 TABLET ORAL at 16:53

## 2021-03-14 RX ADMIN — ATORVASTATIN CALCIUM 40 MILLIGRAM(S): 80 TABLET, FILM COATED ORAL at 23:27

## 2021-03-14 RX ADMIN — LEVETIRACETAM 750 MILLIGRAM(S): 250 TABLET, FILM COATED ORAL at 05:50

## 2021-03-14 RX ADMIN — Medication 40 MILLIEQUIVALENT(S): at 12:03

## 2021-03-14 RX ADMIN — LEVETIRACETAM 750 MILLIGRAM(S): 250 TABLET, FILM COATED ORAL at 16:53

## 2021-03-14 RX ADMIN — PANTOPRAZOLE SODIUM 40 MILLIGRAM(S): 20 TABLET, DELAYED RELEASE ORAL at 05:50

## 2021-03-14 RX ADMIN — Medication 25 MILLIGRAM(S): at 12:03

## 2021-03-14 NOTE — PROGRESS NOTE ADULT - ASSESSMENT
60yoM extensive PMHx including HTN, colon resection for bowel resection s/p ostomy with reversal, Ascending arotic dissection with repair, MVR, AVE, Afib/flutter on coumadin, CABG (LIMA, TAMMY, 100%RCA with collaterals) 11/2020, HF recovered EF (45-50%), COVID, ESRD HD T/Th/Sa (10/2020), LUE AVF. CVA with residual left-sided weakness, seizure disorder, psoriasis s/p Humira injections, remote hx of cocaine/EtOH abuse, sent to hospital for anemia.     Anemia/GIB  - s/p 3 units RBCs  - Gi Following  - plan for colonoscopy  - hold asa, coumadin for now  - no FFP for now, repeat PTT/INR    Pre-operative cardiovascular risk evaluation and management.  - No cardiac symptoms.  heart failure with improved EF.  No further cardiac work up is needed.  Further cardiac work up will not change risk of the patient. Proceed for surgery as indicated.    CAD  - cont metoprolol, statin  - EF 45-50%    Afib/flutter  - coumadin on hold.   - increase metoprolol    FFps if INR not normalzied by itself by the time of Colonoscopy.   will f/u INr tmorrow.     'Hypokalemia: Elkeavtaed cr and patient still hypokaelic. Renal to address caus eof hypokelmia.   recommend potassiu mrepletion as patient has prolong Qtc  mag check adn potasisunm check tomorrow.       ESRD  - nephrology following

## 2021-03-14 NOTE — PROGRESS NOTE ADULT - SUBJECTIVE AND OBJECTIVE BOX
[place] [facility]                                                        Office: [address] [address]                                                       Telephone: [phone]. Fax:[phone]                                                                             PROGRESS NOTE   Reason for follow up:  anemia, and GI bleeding on anticoagulation and antiplateelts.                             Overnight: No new events.   Update:  INR was elevated. Plan for colonoscopy tuesday.     Subjective: "  i am ok./ _"   Complains of:  no new symptoms.   Review of symptoms: Cardiac:  No chest pain. No dyspnea. No palpitations.  Respiratory:no cough. No dyspnea  Gastrointestinal: No diarrhea. No abdominal pain. No bleeding.     Past medical history: No updates.   Chronic conditions:  Hypertension: controlled. CHF: Stable. CAD: Stable ischemic heart disease.     Vitals:  T(C): 36.6 (03-14-21 @ 15:40), Max: 36.7 (03-14-21 @ 08:43)  HR: 90 (03-14-21 @ 15:40) (78 - 90)  BP: 117/70 (03-14-21 @ 15:40) (108/61 - 124/72)  RR: 18 (03-14-21 @ 15:40) (18 - 18)  SpO2: 99% (03-14-21 @ 15:40) (97% - 99%)  Wt(kg): --  I&O's Summary    Weight (kg): 83.9 (03-12 @ 14:47)    PHYSICAL EXAM:  Appearance: Comfortable. No acute distress  HEENT:  Head and neck: Atraumatic. Normocephalic.  Normal oral mucosa, PERRL, Neck is supple. No JVD, No carotid bruit.   Neurologic: A & O x 3, no focal deficits. EOMI , Cranial nerves are intact.  Lymphatic: No cervical lymphadenopathy  Cardiovascular: Normal S1 S2, No murmur, rubs/gallops. No JVD, No edema  Respiratory: Lungs clear to auscultation  Gastrointestinal:  Soft, Non-tender, + BS  Lower Extremities: No edema  Psychiatry: Patient is calm. No agitation. Mood & affect appropriate  Skin: No rashes/ ecchymoses/cyanosis/ulcers visualized on the face, hands or feet.    CURRENT MEDICATIONS:  diltiazem Injectable 10 milliGRAM(s) IV Push every 4 hours PRN  metoprolol tartrate 25 milliGRAM(s) Oral every 8 hours    gabapentin  levETIRAcetam  pantoprazole  Injectable  atorvastatin  ascorbic acid  epoetin yolanda-epbx (RETACRIT) Injectable  magnesium oxide      LABS:	 	                              8.0    6.90  )-----------( 147      ( 14 Mar 2021 11:06 )             25.5     03-14    140  |  99  |  30.0<H>  ----------------------------<  96  3.4<L>   |  29.0  |  3.35<H>    Ca    8.7      14 Mar 2021 11:06  Phos  3.5     03-14  Mg     1.8     03-14    TPro  6.3<L>  /  Alb  3.5  /  TBili  1.1  /  DBili  x   /  AST  11  /  ALT  10  /  AlkPhos  69  03-14    proBNP:   Lipid Profile:   HgA1c: TSH:     TELEMETRY: Reviewed  not on tele  ECG:  Reviewed by me. 	< from: 12 Lead ECG (03.12.21 @ 17:29) >    Diagnosis Line Normal sinus rhythm  Left axis deviation  Prolonged QT    < end of copied text >      DIAGNOSTIC TESTING:  [ ] Echocardiogram: nov 2020: LVEF 40%. moderate RVdysfunciton. mildpulm HTN.   20201: LVEf normalized.

## 2021-03-14 NOTE — PROGRESS NOTE ADULT - SUBJECTIVE AND OBJECTIVE BOX
Vital Signs Last 24 Hrs  T(C): 36.6 (14 Mar 2021 15:40), Max: 37.8 (13 Mar 2021 19:31)  T(F): 97.8 (14 Mar 2021 15:40), Max: 100.1 (13 Mar 2021 19:31)  HR: 90 (14 Mar 2021 15:40) (78 - 129)  BP: 117/70 (14 Mar 2021 15:40) (108/61 - 129/79)  BP(mean): --  RR: 18 (14 Mar 2021 15:40) (18 - 20)  SpO2: 99% (14 Mar 2021 15:40) (92% - 99%)    140    |  99     |  30.0<H>  ----------------------------<  96     Ca:8.7   (14 Mar 2021 11:06)  3.4<L>   |  29.0   |  3.35<H>    eGFR if Non : 19 <L>  eGFR if : 22 <L>    TPro  6.3<L>  /  Alb  3.5    /  TBili  1.1    /  DBili  x      /  AST  11     /  ALT  10     /  AlkPhos  69     14 Mar 2021 11:06                        8.0<L>  6.90  )-----------( 147<L>    ( 14 Mar 2021 11:06 )             25.5<L>    Phos:3.5 mg/dL M.8 mg/dL  (-14 @ 08:46)    HD In AM. ( No Heparin ) Na+ Modeling , No UF,    Colonoscopy planned on Tuesday,     D/W Dr. Miller,

## 2021-03-14 NOTE — PROGRESS NOTE ADULT - ASSESSMENT
60 years old male with extensive PMH including ESRD on HD (since ~10/2020) - s/p LUE AVF (1/2021), CAD s/p remote PCI/GABG, AT/A. Flutter on Coumadin, Aortic Insufficiency and Mitral Regurgitation s/p bioAVR and bioMVR (11/2020), HFrEF (EF 40%), Ascending Aortic Dissection s/p surgery (~2010) complicated by CVA with residual left-sided weakness, Colon Resection s/p ostomy with reversal, HTN, Seizure Disorder, Psoriasis s/p Humira injections who was sent to hospital after dialysis center labs taken a few days ago revealed anemia, on admission found to have Hgb of 4.4, +FOBT    1) Acute on chronic blood loss anemia   - Multifactorial from GI bleeding (intermittent hematochezia and occasional melena) and bleeding from AVF intra-HD sessions  - CT with no acute GI bleed    - s/p 3 units of PRBCs  - Aspirin and Coumadin on hold  - GI following, plan for colonoscopy on Tuesday    2) Coagulopathy  - Hold Coumadin  - INR trending down, if still elevated tomorrow then will give FFP/Vit K    3) A. Flutter  - Increased Metoprolol to 25 mg q 8 hours  - Hold Coumadin   - Cardiology Consult appreciated     4) ESRD on HD (TTS)  - Will ask Renal for HD tomorrow  - Renal following     5) Hypokalemia  - Repleted     6) CAD / CVA / HLD / HTN  - Aspirin on hold  - Continue Lipitor and Metoprolol     7) Seizure Disorder  - Continue Keppra     DVT Prophylaxis -- No need for chemical prophylaxis. INR supra-therapeutic.     Dispo: Likely home in 3 days.

## 2021-03-14 NOTE — PROGRESS NOTE ADULT - SUBJECTIVE AND OBJECTIVE BOX
Gastrointestinal hemorrhage    HPI:  60yoM extensive PMHx including ESRD on HD on Tu/Thur/Sat (since ~10/2020) s/p LUE AVF (1/2021), CAD s/p remote PCI, AT/Aflutter, aortic insufficiency and mitral regurgitation s/p bioAVR and bioMVR, CABG(11/2020), on coumadin, HFrEF (EF 40%), ascending aortic dissection s/p surgery (~2010) complicated by CVA with residual left-sided weakness, colon resection for bowel resection s/p ostomy with reversal, also hx HTN, seizure disorder, psoriasis s/p Humira injections, remote hx of cocaine/EtOH abuse, who was sent to hospital after dialysis center labs taken a few days ago revealed anemia.  On admission, pt found to have Hgb of 4.4 (baseline Hgb 7-9) with positive FOBT.  Pt has been having intermittent painless hematochezia for several months with some occasional dark stools including episodes of this during his previous hospitalization in January with worsening anemia.  During that time, he was seen by GI and per documentation, pt declined inpatient colonoscopy opted for hemorrhoid rectal cream and to follow up regarding outpatient colonoscopy.  Pt reports remote hx of endoscopy and colonoscopy which he says was done for ‘corn stuck in his colon’, but pt unable to remember what the reports showed and states he wasn't told about anything serious although these were done prior to his colon surgeries.  Pt has not had endoscopy or colonoscopy in decades and has not had any outpatient GI evaluation since onset of his GI bleeding. Pt reports being hesitant due to his complicated anatomy given his hx of bowel resection and prior ostomy.  Pt also reports episodes of prolonged bleeding from his AVF, lasting about 30 minutes at a time during his HD sessions. Pt reports some generalized weakness dizziness and exertional dyspnea over past few days.    Interval History:  Patient was seen and examined at bedside around 9:45 am.   No more episodes of bleeding per rectum.   Denies abdominal pain, nausea or vomiting.   Denies chest pain, palpitations, shortness of breath, headache or dizziness.     ROS:  As per interval history otherwise unremarkable.    PHYSICAL EXAM:  Vital Signs   T(C): 36.6 (14 Mar 2021 15:40), Max: 37.8 (13 Mar 2021 19:31)  T(F): 97.8 (14 Mar 2021 15:40), Max: 100.1 (13 Mar 2021 19:31)  HR: 90 (14 Mar 2021 15:40) (78 - 129)  BP: 117/70 (14 Mar 2021 15:40) (108/61 - 129/79)  RR: 18 (14 Mar 2021 15:40) (18 - 20)  SpO2: 99% (14 Mar 2021 15:40) (92% - 99%)  General: Well developed. Well nourished. No acute distress  HEENT: EOMI. Clear conjunctivae. Moist mucus membrane  Neck: Supple.   Chest: CTA bilaterally. No wheezing, rales or rhonchi.   Heart: Normal S1 & S2. RRR.   Abdomen: Soft. Non-tender. Non-distended. + BS. Old healed scars.   Ext: No pedal edema. No calf tenderness. AVF in LUE.   Neuro: AAO x 3. No focal deficit. No speech disorder  Skin: Warm and Dry  Psychiatry: Normal mood and affect    MEDICATIONS  (STANDING):  ascorbic acid 500 milliGRAM(s) Oral daily  atorvastatin 40 milliGRAM(s) Oral at bedtime  epoetin yolanda-epbx (RETACRIT) Injectable 8000 Unit(s) IV Push <User Schedule>  gabapentin 100 milliGRAM(s) Oral at bedtime  levETIRAcetam 750 milliGRAM(s) Oral two times a day  metoprolol tartrate 25 milliGRAM(s) Oral every 8 hours  pantoprazole  Injectable 40 milliGRAM(s) IV Push two times a day  sevelamer carbonate 800 milliGRAM(s) Oral two times a day with meals    MEDICATIONS  (PRN):  diltiazem Injectable 10 milliGRAM(s) IV Push every 4 hours PRN If HR persistently > 120    LABS:                        8.0    6.90  )-----------( 147      ( 14 Mar 2021 11:06 )             25.5     03-14    140  |  99  |  30.0<H>  ----------------------------<  96  3.4<L>   |  29.0  |  3.35<H>    Ca    8.7      14 Mar 2021 11:06  Phos  3.5     03-14  Mg     1.8     03-14    TPro  6.3<L>  /  Alb  3.5  /  TBili  1.1  /  DBili  x   /  AST  11  /  ALT  10  /  AlkPhos  69  03-14    PT/INR - ( 14 Mar 2021 11:06 )   PT: 24.6 sec;   INR: 2.20 ratio       PTT - ( 14 Mar 2021 11:06 )  PTT:36.5 sec    RADIOLOGY & ADDITIONAL STUDIES:  Reviewed           Assessment and Plan:   · Assessment	  60 years old male with extensive PMH including ESRD on HD (since ~10/2020) - s/p LUE AVF (1/2021), CAD s/p remote PCI/GABG, AT/A. Flutter on Coumadin, Aortic Insufficiency and Mitral Regurgitation s/p bioAVR and bioMVR (11/2020), HFrEF (EF 40%), Ascending Aortic Dissection s/p surgery (~2010) complicated by CVA with residual left-sided weakness, Colon Resection s/p ostomy with reversal, HTN, Seizure Disorder, Psoriasis s/p Humira injections who was sent to hospital after dialysis center labs taken a few days ago revealed anemia, on admission found to have Hgb of 4.4, +FOBT    1) Acute on chronic blood loss anemia   - Multifactorial from GI bleeding (intermittent hematochezia and occasional melena) and bleeding from AVF intra-HD sessions  - CT with no acute GI bleed    - s/p 3 units of PRBCs  - Aspirin and Coumadin on hold  - GI following, plan for colonoscopy on Tuesday    2) Coagulopathy  - Hold Coumadin  - INR trending down, if still elevated tomorrow then will give FFP/Vit K    3) A. Flutter  - Increased Metoprolol to 25 mg q 8 hours  - Hold Coumadin   - Cardiology Consult appreciated     4) ESRD on HD (TTS)  - Will ask Renal for HD tomorrow  - Renal following     5) Hypokalemia  - Repleted     6) CAD / CVA / HLD / HTN  - Aspirin on hold  - Continue Lipitor and Metoprolol     7) Seizure Disorder  - Continue Keppra     DVT Prophylaxis -- No need for chemical prophylaxis. INR supra-therapeutic.

## 2021-03-14 NOTE — PROGRESS NOTE ADULT - PROBLEM SELECTOR PLAN 1
no bleeding now,   anemia- transfuse for hemoglobin less than 8  will do colonoscopy when INR less than 1.4. Check INR tomorrow. If elevated, will give FFP and possibly plan for colon tuesday    If colon is to be done tuesday, pt may benefit from dialysis on monday

## 2021-03-14 NOTE — PROGRESS NOTE ADULT - SUBJECTIVE AND OBJECTIVE BOX
Patient is a 60y old  Male who presents with a chief complaint of acute blood loss anemia, GI bleeding (14 Mar 2021 15:56)      HPI:  60yoM extensive PMHx including ESRD on HD on Tu/Thur/Sat (since ~10/2020) s/p LUE AVF (1/2021), CAD s/p remote PCI, AT/Aflutter, aortic insufficiency and mitral regurgitation s/p bioAVR and bioMVR, CABG(11/2020), on coumadin, HFrEF (EF 40%), ascending aortic dissection s/p surgery (~2010) complicated by CVA with residual left-sided weakness, colon resection for bowel resection s/p ostomy with reversal, also hx HTN, seizure disorder, psoriasis s/p Humira injections, remote hx of cocaine/EtOH abuse,       No further rectal bleeding.  hemoglobin 8. INR still elevated at 2.2. Had dialysis yesterday.     REVIEW OF SYSTEMS:  Constitutional: No fever, weight loss or fatigue  ENMT:  No difficulty hearing, tinnitus, vertigo; No sinus or throat pain  Respiratory: No cough, wheezing, chills or hemoptysis  Cardiovascular: No chest pain, palpitations, dizziness or leg swelling  Gastrointestinal: No abdominal or epigastric pain. No nausea, vomiting or hematemesis; No diarrhea or constipation.+ hematochezia.  Skin: No itching, burning, rashes or lesions   Musculoskeletal: No joint pain or swelling; No muscle, back or extremity pain    PAST MEDICAL & SURGICAL HISTORY:  Mitral regurgitation    H/O aortic valve insufficiency    H/O aortic dissection  s/p repair (2010), surgery complicated by bowel ischemia s/p bowel resection and ostomy with reversal    History of cocaine use  remote hx, currently sober    Former smoker    ESRD on dialysis  Tu/Thurs/Sat    Atrial fibrillation    COVID-19 october 2020    Hyperlipemia    HTN (hypertension)    Seizures  last seizure 10 years ago    CVA (cerebral vascular accident)    CHF (congestive heart failure)    S/P MVR (mitral valve replacement)    S/P AVR (aortic valve replacement)    Status post double vessel coronary artery bypass    H/O colectomy    Aorta disorder        FAMILY HISTORY:  Family history of cardiac disorder  mother    FH: hypertension        SOCIAL HISTORY:  Smoking Status: [ ] Current, [ ] Former, [ ] Never  Pack Years:  [  ] EtOH-no  [  ] IVDA    MEDICATIONS:  MEDICATIONS  (STANDING):  ascorbic acid 500 milliGRAM(s) Oral daily  atorvastatin 40 milliGRAM(s) Oral at bedtime  epoetin yolanda-epbx (RETACRIT) Injectable 8000 Unit(s) IV Push <User Schedule>  gabapentin 100 milliGRAM(s) Oral at bedtime  levETIRAcetam 750 milliGRAM(s) Oral two times a day  magnesium oxide 400 milliGRAM(s) Oral three times a day with meals  metoprolol tartrate 25 milliGRAM(s) Oral every 8 hours  pantoprazole  Injectable 40 milliGRAM(s) IV Push two times a day  sevelamer carbonate 800 milliGRAM(s) Oral two times a day with meals    MEDICATIONS  (PRN):  diltiazem Injectable 10 milliGRAM(s) IV Push every 4 hours PRN If HR persistently > 120      Allergies    penicillin (Other)    Intolerances        Vital Signs Last 24 Hrs  T(C): 36.6 (14 Mar 2021 15:40), Max: 37.8 (13 Mar 2021 19:31)  T(F): 97.8 (14 Mar 2021 15:40), Max: 100.1 (13 Mar 2021 19:31)  HR: 90 (14 Mar 2021 15:40) (78 - 121)  BP: 117/70 (14 Mar 2021 15:40) (108/61 - 124/72)  BP(mean): --  RR: 18 (14 Mar 2021 15:40) (18 - 18)  SpO2: 99% (14 Mar 2021 15:40) (96% - 99%)        PHYSICAL EXAM:    General: Well developed; well nourished; in no acute distress  HEENT: MMM, conjunctiva and sclera clear  H- RRR  L - CTA   Gastrointestinal: Soft, non-tender non-distended; Normal bowel sounds; No rebound or guarding  Extremities: Normal range of motion, No clubbing, cyanosis or edema  Neurological: Alert and oriented x3  Skin: Warm and dry. No obvious rash      LABS:                        8.0    6.90  )-----------( 147      ( 14 Mar 2021 11:06 )             25.5     14 Mar 2021 11:06    140    |  99     |  30.0   ----------------------------<  96     3.4     |  29.0   |  3.35     Ca    8.7        14 Mar 2021 11:06  Phos  3.5       14 Mar 2021 08:46  Mg     1.8       14 Mar 2021 08:46    TPro  6.3    /  Alb  3.5    /  TBili  1.1    /  DBili  x      /  AST  11     /  ALT  10     /  AlkPhos  69     / Amylase x      /Lipase x      14 Mar 2021 11:06              RADIOLOGY & ADDITIONAL STUDIES:     Patient is a 60y old  Male who presents with a chief complaint of acute blood loss anemia, GI bleeding (14 Mar 2021 15:56)      HPI:  60yoM extensive PMHx including ESRD on HD on Tu/Thur/Sat (since ~10/2020) s/p LUE AVF (1/2021), CAD s/p remote PCI, AT/Aflutter, aortic insufficiency and mitral regurgitation s/p bioAVR and bioMVR, CABG(11/2020), on coumadin, HFrEF (EF 40%), ascending aortic dissection s/p surgery (~2010) complicated by CVA with residual left-sided weakness, colon resection for bowel resection s/p ostomy with reversal, also hx HTN, seizure disorder, psoriasis s/p Humira injections, remote hx of cocaine/EtOH abuse, who was sent to hospital after dialysis center labs taken a few days ago revealed anemia.  On admission, pt found to have Hgb of 4.4 (baseline Hgb 7-9) with positive FOBT.  Pt has been having intermittent painless hematochezia for several months with some occasional dark stools including episodes of this during his previous hospitalization in January with worsening anemia.  During that time, he was seen by GI and per documentation, pt declined inpatient colonoscopy opted for hemorrhoid rectal cream and to follow up regarding outpatient colonoscopy.  Pt reports remote hx of endoscopy and colonoscopy which he says was done for ‘corn stuck in his colon’, but pt unable to remember what the reports showed and states he wasn't told about anything serious although these were done prior to his colon surgeries.  Pt has not had endoscopy or colonoscopy in decades and has not had any outpatient GI evaluation since onset of his GI bleeding. Pt reports being hesitant due to his complicated anatomy given his hx of bowel resection and prior ostomy.  Pt also reports episodes of prolonged bleeding from his AVF, lasting about 30 minutes at a time during his HD sessions. Pt reports some generalized weakness dizziness and exertional dyspnea over past few days.   (12 Mar 2021 23:03)      REVIEW OF SYSTEMS:  Constitutional: No fever, weight loss or fatigue  ENMT:  No difficulty hearing, tinnitus, vertigo; No sinus or throat pain  Respiratory: No cough, wheezing, chills or hemoptysis  Cardiovascular: No chest pain, palpitations, dizziness or leg swelling  Gastrointestinal: No abdominal or epigastric pain. No nausea, vomiting or hematemesis; No diarrhea or constipation. No melena or hematochezia.  Skin: No itching, burning, rashes or lesions   Musculoskeletal: No joint pain or swelling; No muscle, back or extremity pain    PAST MEDICAL & SURGICAL HISTORY:  Mitral regurgitation    H/O aortic valve insufficiency    H/O aortic dissection  s/p repair (2010), surgery complicated by bowel ischemia s/p bowel resection and ostomy with reversal    History of cocaine use  remote hx, currently sober    Former smoker    ESRD on dialysis  Tu/Thurs/Sat    Atrial fibrillation    COVID-19 october 2020    Hyperlipemia    HTN (hypertension)    Seizures  last seizure 10 years ago    CVA (cerebral vascular accident)    CHF (congestive heart failure)    S/P MVR (mitral valve replacement)    S/P AVR (aortic valve replacement)    Status post double vessel coronary artery bypass    H/O colectomy    Aorta disorder        FAMILY HISTORY:  Family history of cardiac disorder  mother    FH: hypertension        SOCIAL HISTORY:  Smoking Status: [ ] Current, [ ] Former, [ ] Never  Pack Years:  [  ] EtOH  [  ] IVDA    MEDICATIONS:  MEDICATIONS  (STANDING):  ascorbic acid 500 milliGRAM(s) Oral daily  atorvastatin 40 milliGRAM(s) Oral at bedtime  epoetin yolanda-epbx (RETACRIT) Injectable 8000 Unit(s) IV Push <User Schedule>  gabapentin 100 milliGRAM(s) Oral at bedtime  levETIRAcetam 750 milliGRAM(s) Oral two times a day  magnesium oxide 400 milliGRAM(s) Oral three times a day with meals  metoprolol tartrate 25 milliGRAM(s) Oral every 8 hours  pantoprazole  Injectable 40 milliGRAM(s) IV Push two times a day  sevelamer carbonate 800 milliGRAM(s) Oral two times a day with meals    MEDICATIONS  (PRN):  diltiazem Injectable 10 milliGRAM(s) IV Push every 4 hours PRN If HR persistently > 120      Allergies    penicillin (Other)    Intolerances        Vital Signs Last 24 Hrs  T(C): 36.6 (14 Mar 2021 15:40), Max: 37.8 (13 Mar 2021 19:31)  T(F): 97.8 (14 Mar 2021 15:40), Max: 100.1 (13 Mar 2021 19:31)  HR: 90 (14 Mar 2021 15:40) (78 - 121)  BP: 117/70 (14 Mar 2021 15:40) (108/61 - 124/72)  BP(mean): --  RR: 18 (14 Mar 2021 15:40) (18 - 18)  SpO2: 99% (14 Mar 2021 15:40) (96% - 99%)        PHYSICAL EXAM:    General: Well developed; well nourished; in no acute distress  HEENT: MMM, conjunctiva and sclera clear  Gastrointestinal: Soft, non-tender non-distended; Normal bowel sounds; No rebound or guarding  Extremities: Normal range of motion, No clubbing, cyanosis or edema  Neurological: Alert and oriented x3  Skin: Warm and dry. No obvious rash      LABS:                        8.0    6.90  )-----------( 147      ( 14 Mar 2021 11:06 )             25.5     14 Mar 2021 11:06    140    |  99     |  30.0   ----------------------------<  96     3.4     |  29.0   |  3.35     Ca    8.7        14 Mar 2021 11:06  Phos  3.5       14 Mar 2021 08:46  Mg     1.8       14 Mar 2021 08:46    TPro  6.3    /  Alb  3.5    /  TBili  1.1    /  DBili  x      /  AST  11     /  ALT  10     /  AlkPhos  69     / Amylase x      /Lipase x      14 Mar 2021 11:06              RADIOLOGY & ADDITIONAL STUDIES:   < from: CT Abdomen and Pelvis w/ IV Cont (03.12.21 @ 21:59) >  IMPRESSION:    1. No active GI bleed on this scan.  2. Stable aneurysmal dilatation of ascending aorta and stable appearance of the imaged portion of the aortic dissection.    < end of copied text >

## 2021-03-14 NOTE — PROGRESS NOTE ADULT - SUBJECTIVE AND OBJECTIVE BOX
Gastrointestinal hemorrhage    HPI:  60yoM extensive PMHx including ESRD on HD on Tu/Thur/Sat (since ~10/2020) s/p LUE AVF (1/2021), CAD s/p remote PCI, AT/Aflutter, aortic insufficiency and mitral regurgitation s/p bioAVR and bioMVR, CABG(11/2020), on coumadin, HFrEF (EF 40%), ascending aortic dissection s/p surgery (~2010) complicated by CVA with residual left-sided weakness, colon resection for bowel resection s/p ostomy with reversal, also hx HTN, seizure disorder, psoriasis s/p Humira injections, remote hx of cocaine/EtOH abuse, who was sent to hospital after dialysis center labs taken a few days ago revealed anemia.  On admission, pt found to have Hgb of 4.4 (baseline Hgb 7-9) with positive FOBT.  Pt has been having intermittent painless hematochezia for several months with some occasional dark stools including episodes of this during his previous hospitalization in January with worsening anemia.  During that time, he was seen by GI and per documentation, pt declined inpatient colonoscopy opted for hemorrhoid rectal cream and to follow up regarding outpatient colonoscopy.  Pt reports remote hx of endoscopy and colonoscopy which he says was done for ‘corn stuck in his colon’, but pt unable to remember what the reports showed and states he wasn't told about anything serious although these were done prior to his colon surgeries.  Pt has not had endoscopy or colonoscopy in decades and has not had any outpatient GI evaluation since onset of his GI bleeding. Pt reports being hesitant due to his complicated anatomy given his hx of bowel resection and prior ostomy.  Pt also reports episodes of prolonged bleeding from his AVF, lasting about 30 minutes at a time during his HD sessions. Pt reports some generalized weakness dizziness and exertional dyspnea over past few days.    Interval History:  Patient was seen and examined at bedside around 9:45 am.   No more episodes of bleeding per rectum.   Denies abdominal pain, nausea or vomiting.   Denies chest pain, palpitations, shortness of breath, headache or dizziness.     ROS:  As per interval history otherwise unremarkable.    PHYSICAL EXAM:  Vital Signs   T(C): 36.6 (14 Mar 2021 15:40), Max: 37.8 (13 Mar 2021 19:31)  T(F): 97.8 (14 Mar 2021 15:40), Max: 100.1 (13 Mar 2021 19:31)  HR: 90 (14 Mar 2021 15:40) (78 - 129)  BP: 117/70 (14 Mar 2021 15:40) (108/61 - 129/79)  RR: 18 (14 Mar 2021 15:40) (18 - 20)  SpO2: 99% (14 Mar 2021 15:40) (92% - 99%)  General: Well developed. Well nourished. No acute distress  HEENT: EOMI. Clear conjunctivae. Moist mucus membrane  Neck: Supple.   Chest: CTA bilaterally. No wheezing, rales or rhonchi.   Heart: Normal S1 & S2. RRR.   Abdomen: Soft. Non-tender. Non-distended. + BS. Old healed scars.   Ext: No pedal edema. No calf tenderness. AVF in LUE.   Neuro: AAO x 3. No focal deficit. No speech disorder  Skin: Warm and Dry  Psychiatry: Normal mood and affect    MEDICATIONS  (STANDING):  ascorbic acid 500 milliGRAM(s) Oral daily  atorvastatin 40 milliGRAM(s) Oral at bedtime  epoetin yolanda-epbx (RETACRIT) Injectable 8000 Unit(s) IV Push <User Schedule>  gabapentin 100 milliGRAM(s) Oral at bedtime  levETIRAcetam 750 milliGRAM(s) Oral two times a day  metoprolol tartrate 25 milliGRAM(s) Oral every 8 hours  pantoprazole  Injectable 40 milliGRAM(s) IV Push two times a day  sevelamer carbonate 800 milliGRAM(s) Oral two times a day with meals    MEDICATIONS  (PRN):  diltiazem Injectable 10 milliGRAM(s) IV Push every 4 hours PRN If HR persistently > 120    LABS:                        8.0    6.90  )-----------( 147      ( 14 Mar 2021 11:06 )             25.5     03-14    140  |  99  |  30.0<H>  ----------------------------<  96  3.4<L>   |  29.0  |  3.35<H>    Ca    8.7      14 Mar 2021 11:06  Phos  3.5     03-14  Mg     1.8     03-14    TPro  6.3<L>  /  Alb  3.5  /  TBili  1.1  /  DBili  x   /  AST  11  /  ALT  10  /  AlkPhos  69  03-14    PT/INR - ( 14 Mar 2021 11:06 )   PT: 24.6 sec;   INR: 2.20 ratio       PTT - ( 14 Mar 2021 11:06 )  PTT:36.5 sec    RADIOLOGY & ADDITIONAL STUDIES:  Reviewed

## 2021-03-15 ENCOUNTER — TRANSCRIPTION ENCOUNTER (OUTPATIENT)
Age: 61
End: 2021-03-15

## 2021-03-15 DIAGNOSIS — K62.5 HEMORRHAGE OF ANUS AND RECTUM: ICD-10-CM

## 2021-03-15 LAB
ALBUMIN SERPL ELPH-MCNC: 3.7 G/DL — SIGNIFICANT CHANGE UP (ref 3.3–5.2)
ANION GAP SERPL CALC-SCNC: 11 MMOL/L — SIGNIFICANT CHANGE UP (ref 5–17)
ANION GAP SERPL CALC-SCNC: 12 MMOL/L — SIGNIFICANT CHANGE UP (ref 5–17)
BASOPHILS # BLD AUTO: 0.03 K/UL — SIGNIFICANT CHANGE UP (ref 0–0.2)
BASOPHILS NFR BLD AUTO: 0.4 % — SIGNIFICANT CHANGE UP (ref 0–2)
BUN SERPL-MCNC: 41 MG/DL — HIGH (ref 8–20)
BUN SERPL-MCNC: 41 MG/DL — HIGH (ref 8–20)
CALCIUM SERPL-MCNC: 8.5 MG/DL — LOW (ref 8.6–10.2)
CALCIUM SERPL-MCNC: 8.6 MG/DL — SIGNIFICANT CHANGE UP (ref 8.6–10.2)
CHLORIDE SERPL-SCNC: 101 MMOL/L — SIGNIFICANT CHANGE UP (ref 98–107)
CHLORIDE SERPL-SCNC: 101 MMOL/L — SIGNIFICANT CHANGE UP (ref 98–107)
CO2 SERPL-SCNC: 27 MMOL/L — SIGNIFICANT CHANGE UP (ref 22–29)
CO2 SERPL-SCNC: 29 MMOL/L — SIGNIFICANT CHANGE UP (ref 22–29)
CREAT SERPL-MCNC: 3.45 MG/DL — HIGH (ref 0.5–1.3)
CREAT SERPL-MCNC: 3.62 MG/DL — HIGH (ref 0.5–1.3)
EOSINOPHIL # BLD AUTO: 0.57 K/UL — HIGH (ref 0–0.5)
EOSINOPHIL NFR BLD AUTO: 8.2 % — HIGH (ref 0–6)
GLUCOSE SERPL-MCNC: 102 MG/DL — HIGH (ref 70–99)
GLUCOSE SERPL-MCNC: 89 MG/DL — SIGNIFICANT CHANGE UP (ref 70–99)
HCT VFR BLD CALC: 25.8 % — LOW (ref 39–50)
HGB BLD-MCNC: 8.2 G/DL — LOW (ref 13–17)
IMM GRANULOCYTES NFR BLD AUTO: 0.3 % — SIGNIFICANT CHANGE UP (ref 0–1.5)
INR BLD: 1.73 RATIO — HIGH (ref 0.88–1.16)
INR BLD: 1.87 RATIO — HIGH (ref 0.88–1.16)
LYMPHOCYTES # BLD AUTO: 1.6 K/UL — SIGNIFICANT CHANGE UP (ref 1–3.3)
LYMPHOCYTES # BLD AUTO: 23.1 % — SIGNIFICANT CHANGE UP (ref 13–44)
MAGNESIUM SERPL-MCNC: 1.9 MG/DL — SIGNIFICANT CHANGE UP (ref 1.6–2.6)
MCHC RBC-ENTMCNC: 30.8 PG — SIGNIFICANT CHANGE UP (ref 27–34)
MCHC RBC-ENTMCNC: 31.8 GM/DL — LOW (ref 32–36)
MCV RBC AUTO: 97 FL — SIGNIFICANT CHANGE UP (ref 80–100)
MONOCYTES # BLD AUTO: 0.5 K/UL — SIGNIFICANT CHANGE UP (ref 0–0.9)
MONOCYTES NFR BLD AUTO: 7.2 % — SIGNIFICANT CHANGE UP (ref 2–14)
NEUTROPHILS # BLD AUTO: 4.21 K/UL — SIGNIFICANT CHANGE UP (ref 1.8–7.4)
NEUTROPHILS NFR BLD AUTO: 60.8 % — SIGNIFICANT CHANGE UP (ref 43–77)
PHOSPHATE SERPL-MCNC: 3.7 MG/DL — SIGNIFICANT CHANGE UP (ref 2.4–4.7)
PLATELET # BLD AUTO: 149 K/UL — LOW (ref 150–400)
POTASSIUM SERPL-MCNC: 3.7 MMOL/L — SIGNIFICANT CHANGE UP (ref 3.5–5.3)
POTASSIUM SERPL-MCNC: 3.7 MMOL/L — SIGNIFICANT CHANGE UP (ref 3.5–5.3)
POTASSIUM SERPL-SCNC: 3.7 MMOL/L — SIGNIFICANT CHANGE UP (ref 3.5–5.3)
POTASSIUM SERPL-SCNC: 3.7 MMOL/L — SIGNIFICANT CHANGE UP (ref 3.5–5.3)
PROTHROM AB SERPL-ACNC: 19.5 SEC — HIGH (ref 10.6–13.6)
PROTHROM AB SERPL-ACNC: 21.1 SEC — HIGH (ref 10.6–13.6)
RBC # BLD: 2.66 M/UL — LOW (ref 4.2–5.8)
RBC # FLD: 15.9 % — HIGH (ref 10.3–14.5)
SODIUM SERPL-SCNC: 139 MMOL/L — SIGNIFICANT CHANGE UP (ref 135–145)
SODIUM SERPL-SCNC: 142 MMOL/L — SIGNIFICANT CHANGE UP (ref 135–145)
WBC # BLD: 6.93 K/UL — SIGNIFICANT CHANGE UP (ref 3.8–10.5)
WBC # FLD AUTO: 6.93 K/UL — SIGNIFICANT CHANGE UP (ref 3.8–10.5)

## 2021-03-15 PROCEDURE — 99233 SBSQ HOSP IP/OBS HIGH 50: CPT

## 2021-03-15 PROCEDURE — 90937 HEMODIALYSIS REPEATED EVAL: CPT

## 2021-03-15 RX ORDER — PHYTONADIONE (VIT K1) 5 MG
5 TABLET ORAL ONCE
Refills: 0 | Status: COMPLETED | OUTPATIENT
Start: 2021-03-15 | End: 2021-03-15

## 2021-03-15 RX ORDER — MULTIVIT WITH MIN/MFOLATE/K2 340-15/3 G
1 POWDER (GRAM) ORAL ONCE
Refills: 0 | Status: COMPLETED | OUTPATIENT
Start: 2021-03-15 | End: 2021-03-15

## 2021-03-15 RX ADMIN — ERYTHROPOIETIN 8000 UNIT(S): 10000 INJECTION, SOLUTION INTRAVENOUS; SUBCUTANEOUS at 10:34

## 2021-03-15 RX ADMIN — LEVETIRACETAM 750 MILLIGRAM(S): 250 TABLET, FILM COATED ORAL at 17:24

## 2021-03-15 RX ADMIN — LEVETIRACETAM 750 MILLIGRAM(S): 250 TABLET, FILM COATED ORAL at 06:14

## 2021-03-15 RX ADMIN — Medication 25 MILLIGRAM(S): at 06:14

## 2021-03-15 RX ADMIN — Medication 5 MILLIGRAM(S): at 19:00

## 2021-03-15 RX ADMIN — Medication 25 MILLIGRAM(S): at 22:27

## 2021-03-15 RX ADMIN — SEVELAMER CARBONATE 800 MILLIGRAM(S): 2400 POWDER, FOR SUSPENSION ORAL at 17:24

## 2021-03-15 RX ADMIN — ATORVASTATIN CALCIUM 40 MILLIGRAM(S): 80 TABLET, FILM COATED ORAL at 22:27

## 2021-03-15 RX ADMIN — Medication 1 BOTTLE: at 17:30

## 2021-03-15 RX ADMIN — GABAPENTIN 100 MILLIGRAM(S): 400 CAPSULE ORAL at 22:27

## 2021-03-15 RX ADMIN — PANTOPRAZOLE SODIUM 40 MILLIGRAM(S): 20 TABLET, DELAYED RELEASE ORAL at 06:16

## 2021-03-15 RX ADMIN — MAGNESIUM OXIDE 400 MG ORAL TABLET 400 MILLIGRAM(S): 241.3 TABLET ORAL at 17:24

## 2021-03-15 NOTE — PROGRESS NOTE ADULT - SUBJECTIVE AND OBJECTIVE BOX
Gastrointestinal hemorrhage    HPI:  60yoM extensive PMHx including ESRD on HD on Tu/Thur/Sat (since ~10/2020) s/p LUE AVF (1/2021), CAD s/p remote PCI, AT/Aflutter, aortic insufficiency and mitral regurgitation s/p bioAVR and bioMVR, CABG(11/2020), on coumadin, HFrEF (EF 40%), ascending aortic dissection s/p surgery (~2010) complicated by CVA with residual left-sided weakness, colon resection for bowel resection s/p ostomy with reversal, also hx HTN, seizure disorder, psoriasis s/p Humira injections, remote hx of cocaine/EtOH abuse, who was sent to hospital after dialysis center labs taken a few days ago revealed anemia.  On admission, pt found to have Hgb of 4.4 (baseline Hgb 7-9) with positive FOBT.  Pt has been having intermittent painless hematochezia for several months with some occasional dark stools including episodes of this during his previous hospitalization in January with worsening anemia.  During that time, he was seen by GI and per documentation, pt declined inpatient colonoscopy opted for hemorrhoid rectal cream and to follow up regarding outpatient colonoscopy.  Pt reports remote hx of endoscopy and colonoscopy which he says was done for ‘corn stuck in his colon’, but pt unable to remember what the reports showed and states he wasn't told about anything serious although these were done prior to his colon surgeries.  Pt has not had endoscopy or colonoscopy in decades and has not had any outpatient GI evaluation since onset of his GI bleeding. Pt reports being hesitant due to his complicated anatomy given his hx of bowel resection and prior ostomy.  Pt also reports episodes of prolonged bleeding from his AVF, lasting about 30 minutes at a time during his HD sessions. Pt reports some generalized weakness dizziness and exertional dyspnea over past few days.    Interval History:  Patient was seen and examined at bedside around 11:45 am.   Just came back from dialysis.   No more episodes of bleeding per rectum.   Denies abdominal pain, nausea or vomiting.   Denies chest pain, palpitations, shortness of breath, headache or dizziness.     ROS:  As per interval history otherwise unremarkable.    PHYSICAL EXAM:  Vital Signs   T(C): 36.7 (15 Mar 2021 11:32), Max: 36.7 (15 Mar 2021 11:32)  T(F): 98.1 (15 Mar 2021 11:32), Max: 98.1 (15 Mar 2021 11:32)  HR: 102 (15 Mar 2021 11:32) (93 - 104)  BP: 109/57 (15 Mar 2021 11:32) (101/62 - 126/73)  BP(mean): 75 (14 Mar 2021 20:30) (75 - 75)  RR: 18 (15 Mar 2021 11:32) (16 - 18)  SpO2: 100% (15 Mar 2021 11:32) (93% - 100%)  General: Well developed. Well nourished. No acute distress  HEENT: EOMI. Clear conjunctivae. Moist mucus membrane  Neck: Supple.   Chest: CTA bilaterally. No wheezing, rales or rhonchi.   Heart: Normal S1 & S2. RRR.   Abdomen: Soft. Non-tender. Non-distended. + BS. Old healed scars.   Ext: No pedal edema. No calf tenderness. AVF in LUE.   Neuro: AAO x 3. No focal deficit. No speech disorder  Skin: Warm and Dry  Psychiatry: Normal mood and affect    MEDICATIONS  (STANDING):  ascorbic acid 500 milliGRAM(s) Oral daily  atorvastatin 40 milliGRAM(s) Oral at bedtime  epoetin yolanda-epbx (RETACRIT) Injectable 8000 Unit(s) IV Push <User Schedule>  gabapentin 100 milliGRAM(s) Oral at bedtime  levETIRAcetam 750 milliGRAM(s) Oral two times a day  magnesium oxide 400 milliGRAM(s) Oral three times a day with meals  metoprolol tartrate 25 milliGRAM(s) Oral every 8 hours  pantoprazole  Injectable 40 milliGRAM(s) IV Push two times a day  sevelamer carbonate 800 milliGRAM(s) Oral two times a day with meals    MEDICATIONS  (PRN):  diltiazem Injectable 10 milliGRAM(s) IV Push every 4 hours PRN If HR persistently > 120    LABS:                        8.2    6.93  )-----------( 149      ( 15 Mar 2021 07:48 )             25.8     03-15    139  |  101  |  41.0<H>  ----------------------------<  102<H>  3.7   |  27.0  |  3.45<H>    Ca    8.5<L>      15 Mar 2021 09:50  Phos  3.7     03-15  Mg     1.9     03-15    TPro  x   /  Alb  3.7  /  TBili  x   /  DBili  x   /  AST  x   /  ALT  x   /  AlkPhos  x   03-15    PT/INR - ( 15 Mar 2021 07:48 )   PT: 21.1 sec;   INR: 1.87 ratio       PTT - ( 14 Mar 2021 11:06 )  PTT:36.5 sec    RADIOLOGY & ADDITIONAL STUDIES:  Reviewed

## 2021-03-15 NOTE — PROGRESS NOTE ADULT - SUBJECTIVE AND OBJECTIVE BOX
Patient is a 60y old  Male who presents with a chief complaint of acute blood loss anemia, GI bleeding (15 Mar 2021 16:07)      HPI:  60yoM extensive PMHx including ESRD on HD on Tu/Thur/Sat (since ~10/2020) s/p LUE AVF (1/2021), CAD s/p remote PCI, AT/Aflutter, aortic insufficiency and mitral regurgitation s/p bioAVR and bioMVR, CABG(11/2020), on coumadin, HFrEF (EF 40%), ascending aortic dissection s/p surgery (~2010) complicated by CVA with residual left-sided weakness, colon resection for bowel resection s/p ostomy with reversal, also hx HTN, seizure disorder, psoriasis s/p Humira injections, remote hx of cocaine/EtOH abuse, who was sent to hospital after dialysis center labs taken a few days ago revealed anemia.         Patient had no further rectal bleeding in 2 days. Hgb stable. INR 1.7.  No abdominal pain. Tolerating solid diet.  Had dialysis today     REVIEW OF SYSTEMS:  Constitutional: No fever, weight loss or fatigue  ENMT:  No difficulty hearing, tinnitus, vertigo; No sinus or throat pain  Respiratory: No cough, wheezing, chills or hemoptysis  Cardiovascular: No chest pain, palpitations, dizziness or leg swelling  Gastrointestinal: No abdominal or epigastric pain. No nausea, vomiting or hematemesis; No diarrhea or constipation. No melena or hematochezia.  Skin: No itching, burning, rashes or lesions   Musculoskeletal: No joint pain or swelling; No muscle, back or extremity pain    PAST MEDICAL & SURGICAL HISTORY:  Mitral regurgitation    H/O aortic valve insufficiency    H/O aortic dissection  s/p repair (2010), surgery complicated by bowel ischemia s/p bowel resection and ostomy with reversal    History of cocaine use  remote hx, currently sober    Former smoker    ESRD on dialysis  Tu/Thurs/Sat    Atrial fibrillation    COVID-19 october 2020    Hyperlipemia    HTN (hypertension)    Seizures  last seizure 10 years ago    CVA (cerebral vascular accident)    CHF (congestive heart failure)    S/P MVR (mitral valve replacement)    S/P AVR (aortic valve replacement)    Status post double vessel coronary artery bypass    H/O colectomy    Aorta disorder        FAMILY HISTORY:  Family history of cardiac disorder  mother    FH: hypertension        SOCIAL HISTORY:  Smoking Status: [ ] Current, [ ] Former, [ ] Never  Pack Years:  [  ] EtOH  [  ] IVDA    MEDICATIONS:  MEDICATIONS  (STANDING):  ascorbic acid 500 milliGRAM(s) Oral daily  atorvastatin 40 milliGRAM(s) Oral at bedtime  epoetin yolanda-epbx (RETACRIT) Injectable 8000 Unit(s) IV Push <User Schedule>  gabapentin 100 milliGRAM(s) Oral at bedtime  levETIRAcetam 750 milliGRAM(s) Oral two times a day  magnesium citrate Oral Solution 1 Bottle Oral once  magnesium oxide 400 milliGRAM(s) Oral three times a day with meals  metoprolol tartrate 25 milliGRAM(s) Oral every 8 hours  pantoprazole  Injectable 40 milliGRAM(s) IV Push two times a day  phytonadione   Solution 5 milliGRAM(s) Oral once  sevelamer carbonate 800 milliGRAM(s) Oral two times a day with meals    MEDICATIONS  (PRN):  diltiazem Injectable 10 milliGRAM(s) IV Push every 4 hours PRN If HR persistently > 120      Allergies    penicillin (Other)    Intolerances        Vital Signs Last 24 Hrs  T(C): 36.8 (15 Mar 2021 16:12), Max: 36.8 (15 Mar 2021 16:12)  T(F): 98.3 (15 Mar 2021 16:12), Max: 98.3 (15 Mar 2021 16:12)  HR: 95 (15 Mar 2021 16:12) (93 - 104)  BP: 122/69 (15 Mar 2021 16:12) (101/62 - 126/73)  BP(mean): 75 (14 Mar 2021 20:30) (75 - 75)  RR: 18 (15 Mar 2021 16:12) (16 - 18)  SpO2: 97% (15 Mar 2021 16:12) (93% - 100%)    03-14 @ 07:01  -  03-15 @ 07:00  --------------------------------------------------------  IN: 240 mL / OUT: 0 mL / NET: 240 mL    03-15 @ 07:01  -  03-15 @ 17:20  --------------------------------------------------------  IN: 0 mL / OUT: 0 mL / NET: 0 mL          PHYSICAL EXAM:    General: Well developed; well nourished; in no acute distress  HEENT: MMM, conjunctiva and sclera clear  H- RRR  L- CTA  Gastrointestinal: Soft, non-tender non-distended; Normal bowel sounds; No rebound or guarding  Extremities: Normal range of motion, No clubbing, cyanosis or edema  Neurological: Alert and oriented x3  Skin: Warm and dry. No obvious rash      LABS:                        8.2    6.93  )-----------( 149      ( 15 Mar 2021 07:48 )             25.8     15 Mar 2021 09:50    139    |  101    |  41.0   ----------------------------<  102    3.7     |  27.0   |  3.45     Ca    8.5        15 Mar 2021 09:50  Phos  3.7       15 Mar 2021 09:50  Mg     1.9       15 Mar 2021 07:48    TPro  x      /  Alb  3.7    /  TBili  x      /  DBili  x      /  AST  x      /  ALT  x      /  AlkPhos  x      / Amylase x      /Lipase x      15 Mar 2021 09:50              RADIOLOGY & ADDITIONAL STUDIES:     < from: CT Abdomen and Pelvis w/ IV Cont (03.12.21 @ 21:59) >  MPRESSION:    1. No active GI bleed on this scan.  2. Stable aneurysmal dilatation of ascending aorta and stable appearance of the imaged portion of the aortic dissection.        < end of copied text >

## 2021-03-15 NOTE — PROGRESS NOTE ADULT - PROBLEM SELECTOR PLAN 1
- pt is s/p subtotal colectomy  - will give 1 bottle magnesium citrate and plan for colonoscopy tomorrow with tap water enema in am  - cardiac clearance obtained  - NPo after midnight   - - pt is s/p subtotal colectomy  - will give 1 bottle magnesium citrate and plan for colonoscopy tomorrow with tap water enema in am  - cardiac clearance obtained  - NPo after midnight   - covid negative 3/13  -

## 2021-03-15 NOTE — PROGRESS NOTE ADULT - ASSESSMENT
60 years old male with extensive PMH including ESRD on HD (since ~10/2020) - s/p LUE AVF (1/2021), CAD s/p remote PCI/GABG, AT/A. Flutter on Coumadin, Aortic Insufficiency and Mitral Regurgitation s/p bioAVR and bioMVR (11/2020), HFrEF (EF 40%), Ascending Aortic Dissection s/p surgery (~2010) complicated by CVA with residual left-sided weakness, Colon Resection s/p ostomy with reversal, HTN, Seizure Disorder, Psoriasis s/p Humira injections who was sent to hospital after dialysis center labs taken a few days ago revealed anemia, on admission found to have Hgb of 4.4, +FOBT    1) Acute on chronic blood loss anemia   - Multifactorial from GI bleeding (intermittent hematochezia and occasional melena) and bleeding from AVF intra-HD sessions  - CT with no acute GI bleed    - s/p 4 units of PRBCs  - Aspirin and Coumadin on hold  - GI following, plan for colonoscopy on Tuesday    2) Coagulopathy  - Hold Coumadin  - INR trending down, pending repeat from this afternoon, if still elevated then will give FFP for colonoscopy tomorrow     3) A. Flutter  - Increased Metoprolol to 25 mg q 8 hours  - Hold Coumadin   - Cardiology follow up noted     4) ESRD on HD (TTS)  - s/p HD today   - Renal following     5) Hypokalemia  - Repleted     6) CAD / CVA / HLD / HTN  - Aspirin on hold  - Continue Lipitor and Metoprolol     7) Seizure Disorder  - Continue Keppra     DVT Prophylaxis -- No need for chemical prophylaxis. INR supra-therapeutic.     Dispo: Likely home in 24-48 hours.

## 2021-03-15 NOTE — DISCHARGE NOTE NURSING/CASE MANAGEMENT/SOCIAL WORK - PATIENT PORTAL LINK FT
You can access the FollowMyHealth Patient Portal offered by [facility] by registering at the following website: [email or web address]. By joining [employer]’s FollowMyHealth portal, you will also be able to view your health information using other applications (apps) compatible with our system.

## 2021-03-15 NOTE — DISCHARGE NOTE NURSING/CASE MANAGEMENT/SOCIAL WORK - NSDCFUADDAPPT_GEN_ALL_CORE_FT
pt  will  resume dialysis  pt  adamately refuses  home care  visit or a pmd [facility] program  md  appointment   ''I will go  see  Dr Quinn Yo ""

## 2021-03-15 NOTE — PROGRESS NOTE ADULT - PROBLEM SELECTOR PLAN 2
- hemoglobin stable  - INR 1.7. Luís rucker spoke to Dr Nieto. WIll get another unit of FFP. Vitamin K ordered as well.   - check INR in am

## 2021-03-15 NOTE — PROGRESS NOTE ADULT - SUBJECTIVE AND OBJECTIVE BOX
[facility] DIVISION OF KIDNEY DISEASES AND HYPERTENSION -- HEMODIALYSIS NOTE  --------------------------------------------------------------------------------  Chief Complaint: ESRD/Ongoing hemodialysis requirement    24 hour events/subjective: No Active Bleeding, INR Improved,     PAST HISTORY  --------------------------------------------------------------------------------  No significant changes to PMH, PSH, FHx, SHx, unless otherwise noted    ALLERGIES & MEDICATIONS  --------------------------------------------------------------------------------  Allergies    penicillin (Other)    Standing Inpatient Medications  ascorbic acid 500 milliGRAM(s) Oral daily  atorvastatin 40 milliGRAM(s) Oral at bedtime  epoetin yolanda-epbx (RETACRIT) Injectable 8000 Unit(s) IV Push <User Schedule>  gabapentin 100 milliGRAM(s) Oral at bedtime  levETIRAcetam 750 milliGRAM(s) Oral two times a day  magnesium oxide 400 milliGRAM(s) Oral three times a day with meals  metoprolol tartrate 25 milliGRAM(s) Oral every 8 hours  pantoprazole  Injectable 40 milliGRAM(s) IV Push two times a day  sevelamer carbonate 800 milliGRAM(s) Oral two times a day with meals    PRN Inpatient Medications  diltiazem Injectable 10 milliGRAM(s) IV Push every 4 hours PRN    REVIEW OF SYSTEMS  --------------------------------------------------------------------------------  Gen: No weight changes, fatigue, fevers/chills, weakness  Skin: No rashes  Head/Eyes/Ears/Mouth: No headache; Normal hearing; Normal vision w/o blurriness; No sinus pain/discomfort, sore throat  Respiratory: No dyspnea, cough, wheezing, hemoptysis  CV: No chest pain, PND, orthopnea  GI: No abdominal pain, diarrhea, constipation, nausea, vomiting, melena, hematochezia  : No increased frequency, dysuria, hematuria, nocturia  MSK: No joint pain/swelling; no back pain; no edema  Neuro: No dizziness/lightheadedness, weakness, seizures, numbness, tingling  Heme: No easy bruising or bleeding  Endo: No heat/cold intolerance  Psych: No significant nervousness, anxiety, stress, depression    All other systems were reviewed and are negative, except as noted.    VITALS/PHYSICAL EXAM  --------------------------------------------------------------------------------  T(C): 36.6 (03-15-21 @ 08:07), Max: 36.6 (03-14-21 @ 15:40)  HR: 104 (03-15-21 @ 08:07) (79 - 104)  BP: 126/73 (03-15-21 @ 08:07) (101/62 - 126/73)  RR: 18 (03-15-21 @ 08:07) (16 - 18)  SpO2: 99% (03-15-21 @ 08:07) (93% - 99%)    03-14-21 @ 07:01  -  03-15-21 @ 07:00  --------------------------------------------------------  IN: 240 mL / OUT: 0 mL / NET: 240 mL    Physical Exam:  	Gen: NAD, well-appearing, Pale,   	HEENT: PERRL, supple neck,   	Pulm: CTA B/L  	CV: RRR, S1S2; no rub  	Back: No spinal or CVA tenderness; no sacral edema  	Abd: +BS, soft, nontender/nondistended  	: No suprapubic tenderness  	UE: Warm, FROM, no clubbing, intact strength; no edema; no asterixis  	LE: Warm, FROM, no clubbing, intact strength; no edema  	Neuro: No focal deficits, intact gait  	Psych: Normal affect and mood  	Skin: Warm, without rashes  	Vascular access: AVF,     LABS/STUDIES  --------------------------------------------------------------------------------              8.2    6.93  >-----------<  149      [03-15-21 @ 07:48]              25.8     142  |  101  |  41.0  ----------------------------<  89      [03-15-21 @ 07:48]  3.7   |  29.0  |  3.62        Ca     8.6     [03-15-21 @ 07:48]      Mg     1.9     [03-15-21 @ 07:48]      Phos  3.5     [03-14-21 @ 08:46]    TPro  6.3  /  Alb  3.5  /  TBili  1.1  /  DBili  x   /  AST  11  /  ALT  10  /  AlkPhos  69  [03-14-21 @ 11:06]    PT/INR: PT 21.1 , INR 1.87       [03-15-21 @ 07:48]  PTT: 36.5       [03-14-21 @ 11:06]    Iron 29, TIBC 227, %sat 13      [12-05-20 @ 08:05]  Ferritin 1099      [12-05-20 @ 08:05]  TSH 1.95      [11-03-20 @ 08:18]  Lipid: chol --, , HDL --, LDL --      [11-15-20 @ 02:40]

## 2021-03-16 ENCOUNTER — TRANSCRIPTION ENCOUNTER (OUTPATIENT)
Age: 61
End: 2021-03-16

## 2021-03-16 VITALS — SYSTOLIC BLOOD PRESSURE: 114 MMHG | HEART RATE: 87 BPM | DIASTOLIC BLOOD PRESSURE: 65 MMHG

## 2021-03-16 LAB
APTT BLD: 32.8 SEC — SIGNIFICANT CHANGE UP (ref 27.5–35.5)
HCT VFR BLD CALC: 28.5 % — LOW (ref 39–50)
HGB BLD-MCNC: 9.2 G/DL — LOW (ref 13–17)
INR BLD: 1.47 RATIO — HIGH (ref 0.88–1.16)
MCHC RBC-ENTMCNC: 31.3 PG — SIGNIFICANT CHANGE UP (ref 27–34)
MCHC RBC-ENTMCNC: 32.3 GM/DL — SIGNIFICANT CHANGE UP (ref 32–36)
MCV RBC AUTO: 96.9 FL — SIGNIFICANT CHANGE UP (ref 80–100)
PLATELET # BLD AUTO: 154 K/UL — SIGNIFICANT CHANGE UP (ref 150–400)
PROTHROM AB SERPL-ACNC: 16.7 SEC — HIGH (ref 10.6–13.6)
RBC # BLD: 2.94 M/UL — LOW (ref 4.2–5.8)
RBC # FLD: 15.5 % — HIGH (ref 10.3–14.5)
WBC # BLD: 8.33 K/UL — SIGNIFICANT CHANGE UP (ref 3.8–10.5)
WBC # FLD AUTO: 8.33 K/UL — SIGNIFICANT CHANGE UP (ref 3.8–10.5)

## 2021-03-16 PROCEDURE — 85610 PROTHROMBIN TIME: CPT

## 2021-03-16 PROCEDURE — 80048 BASIC METABOLIC PNL TOTAL CA: CPT

## 2021-03-16 PROCEDURE — 86923 COMPATIBILITY TEST ELECTRIC: CPT

## 2021-03-16 PROCEDURE — 99285 EMERGENCY DEPT VISIT HI MDM: CPT | Mod: 25

## 2021-03-16 PROCEDURE — U0005: CPT

## 2021-03-16 PROCEDURE — 84100 ASSAY OF PHOSPHORUS: CPT

## 2021-03-16 PROCEDURE — 43235 EGD DIAGNOSTIC BRUSH WASH: CPT | Mod: 59

## 2021-03-16 PROCEDURE — 86900 BLOOD TYPING SEROLOGIC ABO: CPT

## 2021-03-16 PROCEDURE — 80053 COMPREHEN METABOLIC PANEL: CPT

## 2021-03-16 PROCEDURE — P9016: CPT

## 2021-03-16 PROCEDURE — 36415 COLL VENOUS BLD VENIPUNCTURE: CPT

## 2021-03-16 PROCEDURE — 85025 COMPLETE CBC W/AUTO DIFF WBC: CPT

## 2021-03-16 PROCEDURE — 99233 SBSQ HOSP IP/OBS HIGH 50: CPT

## 2021-03-16 PROCEDURE — 99291 CRITICAL CARE FIRST HOUR: CPT | Mod: 25

## 2021-03-16 PROCEDURE — U0003: CPT

## 2021-03-16 PROCEDURE — 86850 RBC ANTIBODY SCREEN: CPT

## 2021-03-16 PROCEDURE — 93005 ELECTROCARDIOGRAM TRACING: CPT

## 2021-03-16 PROCEDURE — 86901 BLOOD TYPING SEROLOGIC RH(D): CPT

## 2021-03-16 PROCEDURE — 84484 ASSAY OF TROPONIN QUANT: CPT

## 2021-03-16 PROCEDURE — C1889: CPT

## 2021-03-16 PROCEDURE — 99239 HOSP IP/OBS DSCHRG MGMT >30: CPT

## 2021-03-16 PROCEDURE — 45334 SIGMOIDOSCOPY FOR BLEEDING: CPT

## 2021-03-16 PROCEDURE — 36430 TRANSFUSION BLD/BLD COMPNT: CPT

## 2021-03-16 PROCEDURE — 83605 ASSAY OF LACTIC ACID: CPT

## 2021-03-16 PROCEDURE — P9059: CPT

## 2021-03-16 PROCEDURE — 85730 THROMBOPLASTIN TIME PARTIAL: CPT

## 2021-03-16 PROCEDURE — 82272 OCCULT BLD FECES 1-3 TESTS: CPT

## 2021-03-16 PROCEDURE — 83735 ASSAY OF MAGNESIUM: CPT

## 2021-03-16 PROCEDURE — 99261: CPT

## 2021-03-16 PROCEDURE — C1768: CPT

## 2021-03-16 PROCEDURE — 80069 RENAL FUNCTION PANEL: CPT

## 2021-03-16 PROCEDURE — 86769 SARS-COV-2 COVID-19 ANTIBODY: CPT

## 2021-03-16 PROCEDURE — 36000 PLACE NEEDLE IN VEIN: CPT

## 2021-03-16 PROCEDURE — 74177 CT ABD & PELVIS W/CONTRAST: CPT

## 2021-03-16 PROCEDURE — 85027 COMPLETE CBC AUTOMATED: CPT

## 2021-03-16 RX ADMIN — Medication 500 MILLIGRAM(S): at 12:47

## 2021-03-16 RX ADMIN — SEVELAMER CARBONATE 800 MILLIGRAM(S): 2400 POWDER, FOR SUSPENSION ORAL at 12:47

## 2021-03-16 RX ADMIN — Medication 25 MILLIGRAM(S): at 12:47

## 2021-03-16 RX ADMIN — PANTOPRAZOLE SODIUM 40 MILLIGRAM(S): 20 TABLET, DELAYED RELEASE ORAL at 06:07

## 2021-03-16 RX ADMIN — LEVETIRACETAM 750 MILLIGRAM(S): 250 TABLET, FILM COATED ORAL at 06:07

## 2021-03-16 RX ADMIN — MAGNESIUM OXIDE 400 MG ORAL TABLET 400 MILLIGRAM(S): 241.3 TABLET ORAL at 12:47

## 2021-03-16 NOTE — BRIEF OPERATIVE NOTE - OPERATION/FINDINGS
AVM's of the rectum just distal to the anastamosis;  All completely obliterated with APC, standard colon settings.  Distal debris in the rectum removed before any application of APC.  No bleeding.   Surgical anastamosis intact.  Distal small bowel entered x 75 cm:  WNL.  No proctitis or hemorrhoids.  No neoplasia.    EGD:  WNL;  Z line intact at 41 cm.

## 2021-03-16 NOTE — PROGRESS NOTE ADULT - SUBJECTIVE AND OBJECTIVE BOX
[facility] DIVISION OF KIDNEY DISEASES AND HYPERTENSION -- HEMODIALYSIS NOTE  --------------------------------------------------------------------------------  Chief Complaint: ESRD/Ongoing hemodialysis requirement    24 hour events/subjective:  had HD yesterday, tolerated well    PAST HISTORY  --------------------------------------------------------------------------------  No significant changes to PMH, PSH, FHx, SHx, unless otherwise noted    ALLERGIES & MEDICATIONS  --------------------------------------------------------------------------------  Allergies    penicillin (Other)    Intolerances      Standing Inpatient Medications  ascorbic acid 500 milliGRAM(s) Oral daily  atorvastatin 40 milliGRAM(s) Oral at bedtime  epoetin yolanda-epbx (RETACRIT) Injectable 8000 Unit(s) IV Push <User Schedule>  gabapentin 100 milliGRAM(s) Oral at bedtime  levETIRAcetam 750 milliGRAM(s) Oral two times a day  magnesium oxide 400 milliGRAM(s) Oral three times a day with meals  metoprolol tartrate 25 milliGRAM(s) Oral every 8 hours  pantoprazole  Injectable 40 milliGRAM(s) IV Push two times a day  sevelamer carbonate 800 milliGRAM(s) Oral two times a day with meals    PRN Inpatient Medications  diltiazem Injectable 10 milliGRAM(s) IV Push every 4 hours PRN      REVIEW OF SYSTEMS  --------------------------------------------------------------------------------  Gen: No weight changes, fatigue, fevers/chills, weakness  Skin: No rashes  Head/Eyes/Ears/Mouth: No headache  Respiratory: No dyspnea, cough,  CV: No chest pain, orthopnea  GI: No abdominal pain, diarrhea, constipation, nausea, vomiting,  MSK: No joint pain  Neuro: No dizziness/lightheadedness, weakness  Heme: No bleeding  Psych: No significant nervousness, anxiety, stress, depression    All other systems were reviewed and are negative, except as noted.    VITALS/PHYSICAL EXAM  --------------------------------------------------------------------------------  T(C): 36.7 (03-16-21 @ 08:40), Max: 36.8 (03-15-21 @ 16:12)  HR: 87 (03-16-21 @ 12:46) (84 - 116)  BP: 114/65 (03-16-21 @ 12:46) (107/71 - 122/69)  RR: 18 (03-16-21 @ 08:40) (18 - 18)  SpO2: 97% (03-16-21 @ 08:40) (95% - 97%)  Wt(kg): --        03-15-21 @ 07:01  -  03-16-21 @ 07:00  --------------------------------------------------------  IN: 240 mL / OUT: 4 mL / NET: 236 mL      Physical Exam:  	Gen: NAD, well-appearing  	HEENT: PERRL, supple neck,  	Pulm: CTA B/L  	CV: RRR, S1S2; no rub  	Abd: +BS, soft, nontender  	UE: Warm, intact strength; no asterixis  	LE: Warm, no edema  	Neuro: No focal deficits  	Psych: Normal affect and mood  	Skin: Warm, without rashes  	Vascular access: AVF    LABS/STUDIES  --------------------------------------------------------------------------------              9.2    8.33  >-----------<  154      [03-16-21 @ 05:56]              28.5     139  |  101  |  41.0  ----------------------------<  102      [03-15-21 @ 09:50]  3.7   |  27.0  |  3.45        Ca     8.5     [03-15-21 @ 09:50]      Mg     1.9     [03-15-21 @ 07:48]      Phos  3.7     [03-15-21 @ 09:50]    TPro  x   /  Alb  3.7  /  TBili  x   /  DBili  x   /  AST  x   /  ALT  x   /  AlkPhos  x   [03-15-21 @ 09:50]    PT/INR: PT 16.7 , INR 1.47       [03-16-21 @ 05:56]  PTT: 32.8       [03-16-21 @ 05:56]      Iron 29, TIBC 227, %sat 13      [12-05-20 @ 08:05]  Ferritin 1099      [12-05-20 @ 08:05]  TSH 1.95      [11-03-20 @ 08:18]  Lipid: chol --, , HDL --, LDL --      [11-15-20 @ 02:40]

## 2021-03-16 NOTE — DISCHARGE NOTE PROVIDER - HOSPITAL COURSE
60 years old male with extensive PMH including ESRD on HD (since ~10/2020) - s/p LUE AVF (1/2021), CAD s/p remote PCI/GABG, AT/A. Flutter on Coumadin, Aortic Insufficiency and Mitral Regurgitation s/p bioAVR and bioMVR (11/2020), HFrEF (EF 40%), Ascending Aortic Dissection s/p surgery (~2010) complicated by CVA with residual left-sided weakness, Colon Resection s/p ostomy with reversal, HTN, Seizure Disorder, Psoriasis s/p Humira injections who was sent to hospital after dialysis center labs taken a few days ago revealed anemia, on admission found to have Hgb of 4.4, +FOBT    1) Acute on chronic blood loss anemia   - Multifactorial from GI bleeding (intermittent hematochezia and occasional melena) and bleeding from AVF intra-HD sessions  - CT with no acute GI bleed    - s/p 4 units of PRBCs  - Aspirin and Coumadin were held, will be resumed on discharge   - s/p colonoscopy: AVM's of the rectum just distal to the anastamosis;  All completely obliterated with APC, standard colon settings.  Distal debris in the rectum removed before any application of APC.  No bleeding.   Surgical anastamosis intact.  Distal small bowel entered x 75 cm:  WNL.  No proctitis or hemorrhoids.  No neoplasia.    EGD:  WNL;  Z line intact at 41 cm.  - GI cleared to discharge and to resume Coumadin     2) Coagulopathy  - s/p 2 FFPs and Vitamin K 5 mg x 1    3) A. Flutter  - Increased Metoprolol to 25 mg q 8 hours  - Resume Coumadin   - Cardiology follow up noted     4) ESRD on HD (TTS)  - s/p HD on Monday, refused today -- will resume on Thursday as per schedule  - Renal follow up noted     5) Hypokalemia  - Repleted     6) CAD / CVA / HLD / HTN  - Resume Aspirin  - Continue Lipitor and Metoprolol     7) Seizure Disorder  - Continue Keppra     PHYSICAL EXAM:  Vital Signs   T(C): 36.7 (16 Mar 2021 08:40), Max: 36.8 (15 Mar 2021 16:12)  T(F): 98 (16 Mar 2021 08:40), Max: 98.3 (15 Mar 2021 16:12)  HR: 87 (16 Mar 2021 12:46) (84 - 116)  BP: 114/65 (16 Mar 2021 12:46) (107/71 - 122/69)  BP(mean): 83 (16 Mar 2021 05:58) (83 - 83)  RR: 18 (16 Mar 2021 08:40) (18 - 18)  SpO2: 97% (16 Mar 2021 08:40) (95% - 97%)  General: Well developed. Well nourished. No acute distress  HEENT: EOMI. Clear conjunctivae. Moist mucus membrane  Neck: Supple.   Chest: CTA bilaterally. No wheezing, rales or rhonchi.   Heart: Normal S1 & S2. RRR.   Abdomen: Soft. Non-tender. Non-distended. + BS. Old healed scars.   Ext: No pedal edema. No calf tenderness. AVF in LUE.   Neuro: AAO x 3. No focal deficit. No speech disorder  Skin: Warm and Dry  Psychiatry: Normal mood and affect    Time spent: 45

## 2021-03-16 NOTE — DISCHARGE NOTE PROVIDER - NSDCCPCAREPLAN_GEN_ALL_CORE_FT
PRINCIPAL DISCHARGE DIAGNOSIS  Diagnosis: Lower GI bleed  Assessment and Plan of Treatment: s/p colonoscopy.  Continue medications as prescribed.  Follow up with GI in 2 weeks.

## 2021-03-16 NOTE — BRIEF OPERATIVE NOTE - NSICDXBRIEFPROCEDURE_GEN_ALL_CORE_FT
PROCEDURES:  EGD 16-Mar-2021 10:36:37  Julien Bruno  Sigmoidoscopy, flexible, with APC 16-Mar-2021 10:35:52  Julien Bruno

## 2021-03-16 NOTE — PROGRESS NOTE ADULT - REASON FOR ADMISSION
acute blood loss anemia, GI bleeding

## 2021-03-16 NOTE — PROGRESS NOTE ADULT - ASSESSMENT
60yoM extensive PMHx including HTN, colon resection for bowel resection s/p ostomy with reversal, Ascending arotic dissection with repair, MVR, AVE, Afib/flutter on coumadin, CABG (LIMA, TAMMY, 100%RCA with collaterals) 11/2020, HF recovered EF (45-50%), COVID, ESRD HD T/Th/Sa (10/2020), LUE AVF. CVA with residual left-sided weakness, seizure disorder, psoriasis s/p Humira injections, remote hx of cocaine/EtOH abuse, sent to hospital for anemia.     3/16: Pt denies chest pain, palpitations, SOB. Pt s/p sigmoidoscopy.      Anemia/GIB  s/p 3 units RBCs  GI Following  Hold ASA, Coumadin for now  INR-1.47    Pre-operative cardiovascular risk evaluation and management  Pt s/p sigmoidoscopy    CAD  Cont metoprolol, statin  EF 45-50%    Afib/flutter  Coumadin on hold  Cont. metoprolol      Hypokalemia   Elevated Cr   Nephro note appreciated  Recommend potassium repletion as patient has prolong Qtc  Mag check and potassium check tomorrow       ESRD  Nephrology following           60yoM extensive PMHx including HTN, colon resection for bowel resection s/p ostomy with reversal, Ascending arotic dissection with repair, MVR, AVE, Afib/flutter on coumadin, CABG (LIMA, TAMMY, 100%RCA with collaterals) 11/2020, HF recovered EF (45-50%), COVID, ESRD HD T/Th/Sa (10/2020), LUE AVF. CVA with residual left-sided weakness, seizure disorder, psoriasis s/p Humira injections, remote hx of cocaine/EtOH abuse, sent to hospital for anemia.     3/16: Pt denies chest pain, palpitations, SOB. Pt s/p sigmoidoscopy.      Anemia/GIB  s/p 3 units RBCs  GI Following  INR-1.47    Pre-operative cardiovascular risk evaluation and management  Pt s/p sigmoidoscopy    CAD  Cont metoprolol, statin  EF 45-50%    Afib/flutter  Cont. metoprolol      Hypokalemia   Elevated Cr   Nephro note appreciated  Recommend potassium repletion as patient has prolong Qtc  Mag check and potassium check tomorrow       ESRD  Nephrology following

## 2021-03-16 NOTE — DISCHARGE NOTE PROVIDER - NSDCFUADDINST_GEN_ALL_CORE_FT
Follow up with Renal on Thursday for dialysis.   Follow up with PMD in 2-3 days for INR check.  Follow up with GI in 2 weeks.  Follow up with Cardio in 2 weeks.

## 2021-03-16 NOTE — DISCHARGE NOTE PROVIDER - NSDCFUSCHEDAPPT_GEN_ALL_CORE_FT
JONATHAN GIBSON ; 04/07/2021 ; [facility] [place] [address] [facility]  JONATHAN GIBSON ; 06/10/2021 ; [facility] [place] [address] [facility]

## 2021-03-16 NOTE — PROGRESS NOTE ADULT - ASSESSMENT
ESRD on HD  Acute on chronic blood loss anemia ; s/p 4 U PRBC  HTN  CKD_ MBD    Pt getting HD MWF schedule; planned to do HD to resume original TTS schedule  Pt however refused and would like to go to HD on thursday   s/p EGD today; Hb stable . Continue REINALDO  Bp stable  Continue phos binders; monitor Ca++ and phos    d/w Dr. Miller

## 2021-03-16 NOTE — DISCHARGE NOTE PROVIDER - CARE PROVIDER_API CALL
Felice Frankel)  Cardiovascular Disease; Interventional Cardiology  [address] [facility], [address]  [place] [address]  Phone: [phone]  Fax: [phone]  Follow Up Time:     Lacie Garzon (DO)  Gastroenterology  [address] [facility] [facility], [address]  [place] [address]  Phone: [phone]  Fax: [phone]  Follow Up Time:     Quinn Yo  [id number]  [address] [place] [address]  Phone: [phone]  Fax: [phone]  Follow Up Time:     Dago Reyes)  Internal Medicine; Nephrology  [address] [place] [address]  Phone: [phone]  Fax: [phone]  Follow Up Time:

## 2021-03-16 NOTE — PROGRESS NOTE ADULT - ATTENDING COMMENTS
Stable Hb.  Colonoscopy/signomiodscopy. unremarkable : on aspirin coumadin  continue cardiac meds.  No further in-patient cardiac work-up/management is needed.  Follow-up in cardiology office in 2 weeks.

## 2021-03-16 NOTE — DISCHARGE NOTE PROVIDER - PROVIDER TOKENS
PROVIDER:[TOKEN:[[id number]]],PROVIDER:[TOKEN:[[id number]]],PROVIDER:[TOKEN:[[id number]]],PROVIDER:[TOKEN:[[id number]]]

## 2021-03-16 NOTE — BRIEF OPERATIVE NOTE - NSICDXBRIEFPREOP_GEN_ALL_CORE_FT
PRE-OP DIAGNOSIS:  Acute blood loss anemia 16-Mar-2021 10:37:38  Julien Bruno  Hematochezia 16-Mar-2021 10:37:24  Julien Bruno

## 2021-03-16 NOTE — PROGRESS NOTE ADULT - SUBJECTIVE AND OBJECTIVE BOX
[place] [facility]                                                               Office: [address] [address]                                                              Telephone: [phone]. Fax:[phone]                                                                             PROGRESS NOTE  Reason for follow up: acute blood loss anemia, GI bleeding  Overnight: No new events.   Update:     Subjective: No new events      	  Vitals:  T(C): 36.7 (03-16-21 @ 08:40), Max: 36.8 (03-15-21 @ 16:12)  HR: 87 (03-16-21 @ 12:46) (84 - 116)  BP: 114/65 (03-16-21 @ 12:46) (107/71 - 122/69)  RR: 18 (03-16-21 @ 08:40) (18 - 18)  SpO2: 97% (03-16-21 @ 08:40) (95% - 97%)    I&O's Summary    15 Mar 2021 07:01  -  16 Mar 2021 07:00  --------------------------------------------------------  IN: 240 mL / OUT: 4 mL / NET: 236 mL      Weight (kg): 83.9 (03-12 @ 14:47)      PHYSICAL EXAM:  Appearance: Comfortable. No acute distress  HEENT:  Head and neck: Atraumatic. Normocephalic.  Normal oral mucosa, PERRL, Neck is supple. No JVD, No carotid bruit.   Neurologic: A & O x 3, no focal deficits. EOMI.  Lymphatic: No cervical lymphadenopathy  Cardiovascular: Normal S1 S2, No murmur, rubs/gallops. No JVD, No edema  Respiratory: Lungs clear to auscultation  Gastrointestinal:  Soft, Non-tender, + BS  Lower Extremities: No edema  Psychiatry: Patient is calm. No agitation. Mood & affect appropriate  Skin: No rashes/ ecchymoses/cyanosis/ulcers visualized on the face, hands or feet.      CURRENT MEDICATIONS:  diltiazem Injectable 10 milliGRAM(s) IV Push every 4 hours PRN  metoprolol tartrate 25 milliGRAM(s) Oral every 8 hours  gabapentin  levETIRAcetam  pantoprazole  Injectable  atorvastatin  ascorbic acid  epoetin yolanda-epbx (RETACRIT) Injectable  magnesium oxide      DIAGNOSTIC TESTING:  [ ] Echocardiogram: < from: TTE Echo Complete w/o Contrast w/ Doppler (11.23.20 @ 13:54) >  Summary:   1. Left ventricular ejection fraction, by visual estimation, is 40%%.   2. Technically adequate study.   3. Moderately decreased global left ventricular systolic function.   4. Multiple left ventricular regional wall motion abnormalities exist. See wall motion findings.   5. The left ventricular diastolic function could not be assessed in this study.   6. Moderately reduced RV systolic function.   7. Trivial pericardial effusion.   8. A bioprosthetic valve is present in the mitral position.   9. Trace mitral valve regurgitation.  10. Moderate tricuspid regurgitation.  11. Bioprosthesis in the aortic position.  12. Estimated pulmonary artery systolic pressure is 40.9 mmHg assuming a right atrial pressure of 8 mmHg, which is consistent with mild pulmonary hypertension.  13. Endocardial visualization was enhanced with intravenous echo contrast.  14. Mitral valve mean gradient is 2.2 mmHg consistent with mild mitral stenosis.      OTHER:     CT:< from: CT Abdomen and Pelvis w/ IV Cont (03.12.21 @ 21:59) >  IMPRESSION:    1. No active GI bleed on this scan.  2. Stable aneurysmal dilatation of ascending aorta and stable appearance of the imaged portion of the aortic dissection.      LABS:	 	                            9.2    8.33  )-----------( 154      ( 16 Mar 2021 05:56 )             28.5     03-15    139  |  101  |  41.0<H>  ----------------------------<  102<H>  3.7   |  27.0  |  3.45<H>    Ca    8.5<L>      15 Mar 2021 09:50  Phos  3.7     03-15  Mg     1.9     03-15    TPro  x   /  Alb  3.7  /  TBili  x   /  DBili  x   /  AST  x   /  ALT  x   /  AlkPhos  x   03-15      	                                                                      [place] [facility]                                                               Office: [address] [address]                                                              Telephone: [phone]. Fax:[phone]                                                                             PROGRESS NOTE  Reason for follow up: acute blood loss anemia, GI bleeding  Overnight: No new events.   Update: 3/16: Pt denies chest pain, palpitations, SOB. Pt s/p sigmoidoscopy.      Subjective: No new events      	  Vitals:  T(C): 36.7 (03-16-21 @ 08:40), Max: 36.8 (03-15-21 @ 16:12)  HR: 87 (03-16-21 @ 12:46) (84 - 116)  BP: 114/65 (03-16-21 @ 12:46) (107/71 - 122/69)  RR: 18 (03-16-21 @ 08:40) (18 - 18)  SpO2: 97% (03-16-21 @ 08:40) (95% - 97%)    I&O's Summary    15 Mar 2021 07:01  -  16 Mar 2021 07:00  --------------------------------------------------------  IN: 240 mL / OUT: 4 mL / NET: 236 mL      Weight (kg): 83.9 (03-12 @ 14:47)      PHYSICAL EXAM:  Appearance: Comfortable. No acute distress  HEENT:  Head and neck: Atraumatic. Normocephalic.  Normal oral mucosa, PERRL, Neck is supple. No JVD, No carotid bruit.   Neurologic: A & O x 3, no focal deficits. EOMI.  Lymphatic: No cervical lymphadenopathy  Cardiovascular: Normal S1 S2, No murmur, rubs/gallops. No JVD, No edema  Respiratory: Lungs clear to auscultation  Gastrointestinal:  Soft, Non-tender, + BS  Lower Extremities: No edema  Psychiatry: Patient is calm. No agitation. Mood & affect appropriate  Skin: No rashes/ ecchymoses/cyanosis/ulcers visualized on the face, hands or feet.      CURRENT MEDICATIONS:  diltiazem Injectable 10 milliGRAM(s) IV Push every 4 hours PRN  metoprolol tartrate 25 milliGRAM(s) Oral every 8 hours  gabapentin  levETIRAcetam  pantoprazole  Injectable  atorvastatin  ascorbic acid  epoetin yolanda-epbx (RETACRIT) Injectable  magnesium oxide      DIAGNOSTIC TESTING:  [ ] Echocardiogram: < from: TTE Echo Complete w/o Contrast w/ Doppler (11.23.20 @ 13:54) >  Summary:   1. Left ventricular ejection fraction, by visual estimation, is 40%%.   2. Technically adequate study.   3. Moderately decreased global left ventricular systolic function.   4. Multiple left ventricular regional wall motion abnormalities exist. See wall motion findings.   5. The left ventricular diastolic function could not be assessed in this study.   6. Moderately reduced RV systolic function.   7. Trivial pericardial effusion.   8. A bioprosthetic valve is present in the mitral position.   9. Trace mitral valve regurgitation.  10. Moderate tricuspid regurgitation.  11. Bioprosthesis in the aortic position.  12. Estimated pulmonary artery systolic pressure is 40.9 mmHg assuming a right atrial pressure of 8 mmHg, which is consistent with mild pulmonary hypertension.  13. Endocardial visualization was enhanced with intravenous echo contrast.  14. Mitral valve mean gradient is 2.2 mmHg consistent with mild mitral stenosis.      OTHER:     CT:< from: CT Abdomen and Pelvis w/ IV Cont (03.12.21 @ 21:59) >  IMPRESSION:    1. No active GI bleed on this scan.  2. Stable aneurysmal dilatation of ascending aorta and stable appearance of the imaged portion of the aortic dissection.      LABS:	 	                            9.2    8.33  )-----------( 154      ( 16 Mar 2021 05:56 )             28.5     03-15    139  |  101  |  41.0<H>  ----------------------------<  102<H>  3.7   |  27.0  |  3.45<H>    Ca    8.5<L>      15 Mar 2021 09:50  Phos  3.7     03-15  Mg     1.9     03-15    TPro  x   /  Alb  3.7  /  TBili  x   /  DBili  x   /  AST  x   /  ALT  x   /  AlkPhos  x   03-15

## 2021-03-16 NOTE — DISCHARGE NOTE PROVIDER - NSDCMRMEDTOKEN_GEN_ALL_CORE_FT
ascorbic acid 500 mg oral tablet: 1 tab(s) orally once a day  aspirin 81 mg oral delayed release tablet: 1 tab(s) orally once a day  atorvastatin 40 mg oral tablet: 1 tab(s) orally once a day (at bedtime)  Coumadin 5 mg oral tablet: 1 tab(s) orally once a day  gabapentin 100 mg oral capsule: 1 cap(s) orally once a day (at bedtime)  levETIRAcetam 750 mg oral tablet: 1 tab(s) orally 2 times a day  metoprolol succinate 25 mg oral tablet, extended release: 1 tab(s) orally once a day on dialysis days (Tuesday/Thursday/Saturday)  metoprolol tartrate 25 mg oral tablet: 1 tab(s) orally every 12 hours on non-dialysis days (Sunday/Monday/Wednesday/Friday)  pantoprazole 40 mg oral delayed release tablet: 1 tab(s) orally once a day  sevelamer hydrochloride 800 mg oral tablet: 1 tab(s) orally 2 times a day (with meals)

## 2021-03-16 NOTE — BRIEF OPERATIVE NOTE - NSICDXBRIEFPOSTOP_GEN_ALL_CORE_FT
POST-OP DIAGNOSIS:  AVM (arteriovenous malformation) of small bowel, acquired 16-Mar-2021 10:39:35  Julien Bruno

## 2021-03-16 NOTE — PROGRESS NOTE ADULT - PROVIDER SPECIALTY LIST ADULT
Cardiology
Nephrology
Cardiology
Nephrology
Hospitalist
Nephrology
Nephrology
Gastroenterology
Hospitalist
Nephrology
Hospitalist
Gastroenterology
Gastroenterology

## 2021-03-16 NOTE — DISCHARGE NOTE PROVIDER - CARE PROVIDERS DIRECT ADDRESSES
[email or web address]

## 2021-04-02 PROBLEM — Z86.79 PERSONAL HISTORY OF OTHER DISEASES OF THE CIRCULATORY SYSTEM: Chronic | Status: ACTIVE | Noted: 2021-03-13

## 2021-04-02 PROBLEM — Z87.898 PERSONAL HISTORY OF OTHER SPECIFIED CONDITIONS: Chronic | Status: ACTIVE | Noted: 2021-01-25

## 2021-04-02 PROBLEM — I34.0 NONRHEUMATIC MITRAL (VALVE) INSUFFICIENCY: Chronic | Status: ACTIVE | Noted: 2021-03-13

## 2021-04-06 ENCOUNTER — NON-APPOINTMENT (OUTPATIENT)
Age: 61
End: 2021-04-06

## 2021-04-06 ENCOUNTER — APPOINTMENT (OUTPATIENT)
Dept: CARDIOLOGY | Facility: CLINIC | Age: 61
End: 2021-04-06
Payer: MEDICARE

## 2021-04-06 PROCEDURE — 93000 ELECTROCARDIOGRAM COMPLETE: CPT

## 2021-04-06 PROCEDURE — 99215 OFFICE O/P EST HI 40 MIN: CPT

## 2021-04-07 ENCOUNTER — APPOINTMENT (OUTPATIENT)
Dept: COLORECTAL SURGERY | Facility: CLINIC | Age: 61
End: 2021-04-07

## 2021-04-13 RX ORDER — WARFARIN 5 MG/1
5 TABLET ORAL DAILY
Refills: 0 | Status: DISCONTINUED | COMMUNITY
End: 2021-04-13

## 2021-05-05 ENCOUNTER — APPOINTMENT (OUTPATIENT)
Dept: ORTHOPEDIC SURGERY | Facility: CLINIC | Age: 61
End: 2021-05-05

## 2021-06-10 ENCOUNTER — APPOINTMENT (OUTPATIENT)
Dept: VASCULAR SURGERY | Facility: CLINIC | Age: 61
End: 2021-06-10
Payer: MEDICARE

## 2021-06-10 VITALS
DIASTOLIC BLOOD PRESSURE: 77 MMHG | HEART RATE: 194 BPM | SYSTOLIC BLOOD PRESSURE: 145 MMHG | WEIGHT: 184 LBS | OXYGEN SATURATION: 97 % | HEIGHT: 67 IN | TEMPERATURE: 98.1 F | BODY MASS INDEX: 28.88 KG/M2 | RESPIRATION RATE: 15 BRPM

## 2021-06-10 PROCEDURE — 93990 DOPPLER FLOW TESTING: CPT

## 2021-06-10 PROCEDURE — 99214 OFFICE O/P EST MOD 30 MIN: CPT

## 2021-06-10 NOTE — ASSESSMENT
[FreeTextEntry1] : 70 year old man with ESRD s/p left arm AV graft in 01/2021\par -AV graft duplex performed today shows a widely patent graft with no evidence of stenosis.\par -His AV graft does not require any intervention\par -Lower extremity cramping are not due to PAD. Pedal pulses are palpable. Patient does not have claudication. He only gets cramps at rest. May be neurogenic\par -Return PRN

## 2021-06-10 NOTE — PHYSICAL EXAM
[Normal Breath Sounds] : Normal breath sounds [2+] : left 2+ [No Rash or Lesion] : No rash or lesion [Alert] : alert [Oriented to Person] : oriented to person [Oriented to Place] : oriented to place [Oriented to Time] : oriented to time [Calm] : calm [JVD] : no jugular venous distention  [Ankle Swelling (On Exam)] : not present [Varicose Veins Of Lower Extremities] : not present [] : not present [Abdomen Masses] : No abdominal masses [Abdomen Tenderness] : ~T ~M No abdominal tenderness [de-identified] : Well appearing [FreeTextEntry1] : Patent AV graft with good thrill\par No arm swelling\par Pedal pulses strongly palpable bilaterally

## 2021-06-10 NOTE — HISTORY OF PRESENT ILLNESS
[FreeTextEntry1] : 60 year old man with history of Type A aortic dissection s/p repair in 2010 and recent CABG x2, Mitral valve and aortic valve replacement in November 2020. Patient has been on HD via a right IJ tunneled HD catheter since November 2020. Patient referred here for long term AV access.\par Patient is right hand dominant. he had a previous stroke and has some residual left arm weakness\par Patient s/p left forearm loop AV graft on 1/2/21 [de-identified] : Patient s/p left forearm loop AV graft on 1/2/21\par Patient referred for evaluation of his graft due to prolonged bleeding after HD\par He also complains of lower extremity cramps at night and during HD

## 2021-06-12 NOTE — ED PROVIDER NOTE - PHYSICAL EXAMINATION
unk
General: Well appearing male in no acute distress  HEENT: Dry mucous membranes. Oropharynx clear.   Eyes: Pale conjunctiva. EOMI. SHANEL.  Neck: Soft and supple. Full ROM without pain. No midline tenderness  Cardiac: Regular rate and irregular afib rhythm. No LE edema.  Resp: Lungs CTAB. No wheezes, rales or rhonchi.  Abd: Soft, non-tender, non-distended. No guarding or rebound.   Rectal: No bleeding hemorrhoids, brown stool via digital exam. Chaperone Mine Brennan   Skin: No rashes, abrasions, or lacerations.  Neuro: AO x 3. Moves all extremities symmetrically.

## 2021-06-18 ENCOUNTER — APPOINTMENT (OUTPATIENT)
Dept: NEPHROLOGY | Facility: CLINIC | Age: 61
End: 2021-06-18
Payer: MEDICARE

## 2021-06-18 VITALS
SYSTOLIC BLOOD PRESSURE: 152 MMHG | HEIGHT: 67 IN | BODY MASS INDEX: 30.13 KG/M2 | HEART RATE: 120 BPM | TEMPERATURE: 99 F | WEIGHT: 192 LBS | DIASTOLIC BLOOD PRESSURE: 84 MMHG

## 2021-06-18 DIAGNOSIS — Z23 ENCOUNTER FOR IMMUNIZATION: ICD-10-CM

## 2021-06-18 PROCEDURE — 99214 OFFICE O/P EST MOD 30 MIN: CPT

## 2021-06-18 RX ORDER — ASCORBIC ACID, THIAMINE, RIBOFLAVIN, NIACINAMIDE, PYRIDOXINE, FOLIC ACID, COBALAMIN, BIOTIN, PANTOTHENIC ACID 100; 1.5; 1.7; 20; 10; 1; 6; 300; 1 MG/1; MG/1; MG/1; MG/1; MG/1; MG/1; UG/1; UG/1; MG/1
TABLET, COATED ORAL DAILY
Qty: 30 | Refills: 5 | Status: DISCONTINUED | COMMUNITY
Start: 2021-05-27 | End: 2021-06-18

## 2021-06-18 RX ORDER — LIDOCAINE AND PRILOCAINE 25; 25 MG/G; MG/G
2.5-2.5 CREAM TOPICAL
Qty: 30 | Refills: 1 | Status: DISCONTINUED | COMMUNITY
Start: 2021-02-17 | End: 2021-06-18

## 2021-06-18 RX ORDER — CEPHALEXIN 500 MG/1
500 TABLET ORAL
Qty: 14 | Refills: 1 | Status: DISCONTINUED | COMMUNITY
Start: 2021-02-04 | End: 2021-06-18

## 2021-06-18 RX ORDER — WARFARIN 5 MG/1
5 TABLET ORAL
Qty: 30 | Refills: 0 | Status: DISCONTINUED | COMMUNITY
Start: 2021-03-03 | End: 2021-06-18

## 2021-06-18 RX ORDER — AMIODARONE HYDROCHLORIDE 200 MG/1
200 TABLET ORAL DAILY
Qty: 30 | Refills: 5 | Status: DISCONTINUED | COMMUNITY
End: 2021-06-18

## 2021-06-18 RX ORDER — WARFARIN 2 MG/1
2 TABLET ORAL
Qty: 30 | Refills: 0 | Status: DISCONTINUED | COMMUNITY
Start: 2020-12-28 | End: 2021-06-18

## 2021-06-18 RX ORDER — OXYCODONE AND ACETAMINOPHEN 5; 325 MG/1; MG/1
5-325 TABLET ORAL
Qty: 16 | Refills: 0 | Status: DISCONTINUED | COMMUNITY
Start: 2021-02-04 | End: 2021-06-18

## 2021-06-18 RX ORDER — WARFARIN 3 MG/1
3 TABLET ORAL
Qty: 30 | Refills: 0 | Status: DISCONTINUED | COMMUNITY
Start: 2021-02-10 | End: 2021-06-18

## 2021-06-18 RX ORDER — WARFARIN 4 MG/1
4 TABLET ORAL
Qty: 30 | Refills: 3 | Status: DISCONTINUED | COMMUNITY
End: 2021-06-18

## 2021-06-18 RX ORDER — WARFARIN 1 MG/1
1 TABLET ORAL
Qty: 30 | Refills: 0 | Status: DISCONTINUED | COMMUNITY
Start: 2021-01-07 | End: 2021-06-18

## 2021-06-18 RX ORDER — TRAMADOL HYDROCHLORIDE 50 MG/1
50 TABLET, COATED ORAL
Qty: 16 | Refills: 0 | Status: DISCONTINUED | COMMUNITY
Start: 2021-02-01 | End: 2021-06-18

## 2021-06-21 NOTE — ASSESSMENT
[FreeTextEntry1] : Raise TW\par \par Na+ Modeling,\par \par Transplant evaluation after Cardiac Clearance,

## 2021-06-21 NOTE — REASON FOR VISIT
[None] : none [Flexible Endoscope] : examined with the flexible endoscope [Serial Number: ___] : Serial Number: [unfilled] [de-identified] : No lesions in the NPx, OPx, HPx or larynx.  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

## 2021-06-21 NOTE — PHYSICAL EXAM
[General Appearance - Well Developed] : well developed [Normal Conjunctiva] : the conjunctiva exhibited no abnormalities [Normal Oral Mucosa] : normal oral mucosa [Normal Jugular Venous V Waves Present] : normal jugular venous V waves present [General Appearance - Alert] : alert [General Appearance - In No Acute Distress] : in no acute distress [Sclera] : the sclera and conjunctiva were normal [PERRL With Normal Accommodation] : pupils were equal in size, round, and reactive to light [Extraocular Movements] : extraocular movements were intact [Oropharynx] : the oropharynx was normal [Outer Ear] : the ears and nose were normal in appearance [Neck Appearance] : the appearance of the neck was normal [Neck Cervical Mass (___cm)] : no neck mass was observed [Jugular Venous Distention Increased] : there was no jugular-venous distention [Thyroid Diffuse Enlargement] : the thyroid was not enlarged [Thyroid Nodule] : there were no palpable thyroid nodules [Apical Impulse] : the apical impulse was normal [Auscultation Breath Sounds / Voice Sounds] : lungs were clear to auscultation bilaterally [Heart Sounds] : normal S1 and S2 [Heart Sounds Gallop] : no gallops [Heart Sounds Pericardial Friction Rub] : no pericardial rub [Irregularly Irregular] : the rhythm was irregularly irregular [Systolic grade ___/6] : A grade [unfilled]/6 systolic murmur was heard. [Edema] : there was no peripheral edema [Full Pulse] : the pedal pulses are present [Bowel Sounds] : normal bowel sounds [Abdomen Soft] : soft [Abdomen Tenderness] : non-tender [Abdomen Mass (___ Cm)] : no abdominal mass palpated [Cervical Lymph Nodes Enlarged Posterior Bilaterally] : posterior cervical [Cervical Lymph Nodes Enlarged Anterior Bilaterally] : anterior cervical [Supraclavicular Lymph Nodes Enlarged Bilaterally] : supraclavicular [Femoral Lymph Nodes Enlarged Bilaterally] : femoral [Axillary Lymph Nodes Enlarged Bilaterally] : axillary [Inguinal Lymph Nodes Enlarged Bilaterally] : inguinal [No CVA Tenderness] : no ~M costovertebral angle tenderness [No Spinal Tenderness] : no spinal tenderness [Abnormal Walk] : normal gait [Nail Clubbing] : no clubbing  or cyanosis of the fingernails [Musculoskeletal - Swelling] : no joint swelling seen [Motor Tone] : muscle strength and tone were normal [Skin Color & Pigmentation] : normal skin color and pigmentation [Skin Turgor] : normal skin turgor [] : no rash [Deep Tendon Reflexes (DTR)] : deep tendon reflexes were 2+ and symmetric [Sensation] : the sensory exam was normal to light touch and pinprick [No Focal Deficits] : no focal deficits [Oriented To Time, Place, And Person] : oriented to person, place, and time [Impaired Insight] : insight and judgment were intact [Affect] : the affect was normal [FreeTextEntry1] : irregular

## 2021-06-21 NOTE — DISCUSSION/SUMMARY
[FreeTextEntry1] : 1. ESRD: Needs av fistula. To see Dr. Argueta. Patient low risk for planned procedure. \par 2. Afib: On  coumadin. \par 3. GERD: protonix. \par 4. CAD: Continue aspirin. \par 5. AS/MR: TTE to assess valves post surgery. \par \par

## 2021-06-24 ENCOUNTER — INPATIENT (INPATIENT)
Facility: HOSPITAL | Age: 61
LOS: 4 days | Discharge: HOME | DRG: 291 | End: 2021-06-29
Attending: INTERNAL MEDICINE | Admitting: HOSPITALIST
Payer: MEDICARE

## 2021-06-24 ENCOUNTER — APPOINTMENT (OUTPATIENT)
Dept: CARDIOLOGY | Facility: CLINIC | Age: 61
End: 2021-06-24

## 2021-06-24 VITALS
WEIGHT: 167.55 LBS | OXYGEN SATURATION: 94 % | HEIGHT: 69 IN | SYSTOLIC BLOOD PRESSURE: 173 MMHG | DIASTOLIC BLOOD PRESSURE: 106 MMHG | HEART RATE: 130 BPM | RESPIRATION RATE: 29 BRPM | TEMPERATURE: 98 F

## 2021-06-24 DIAGNOSIS — Z95.1 PRESENCE OF AORTOCORONARY BYPASS GRAFT: Chronic | ICD-10-CM

## 2021-06-24 DIAGNOSIS — N18.6 END STAGE RENAL DISEASE: ICD-10-CM

## 2021-06-24 DIAGNOSIS — Z90.49 ACQUIRED ABSENCE OF OTHER SPECIFIED PARTS OF DIGESTIVE TRACT: Chronic | ICD-10-CM

## 2021-06-24 DIAGNOSIS — Z95.2 PRESENCE OF PROSTHETIC HEART VALVE: Chronic | ICD-10-CM

## 2021-06-24 DIAGNOSIS — I77.9 DISORDER OF ARTERIES AND ARTERIOLES, UNSPECIFIED: Chronic | ICD-10-CM

## 2021-06-24 LAB
ALBUMIN SERPL ELPH-MCNC: 4.2 G/DL — SIGNIFICANT CHANGE UP (ref 3.3–5.2)
ALP SERPL-CCNC: 103 U/L — SIGNIFICANT CHANGE UP (ref 40–120)
ALT FLD-CCNC: 18 U/L — SIGNIFICANT CHANGE UP
ANION GAP SERPL CALC-SCNC: 13 MMOL/L — SIGNIFICANT CHANGE UP (ref 5–17)
APTT BLD: 40.7 SEC — HIGH (ref 27.5–35.5)
AST SERPL-CCNC: 19 U/L — SIGNIFICANT CHANGE UP
BASOPHILS # BLD AUTO: 0.04 K/UL — SIGNIFICANT CHANGE UP (ref 0–0.2)
BASOPHILS NFR BLD AUTO: 0.5 % — SIGNIFICANT CHANGE UP (ref 0–2)
BILIRUB SERPL-MCNC: 1.6 MG/DL — SIGNIFICANT CHANGE UP (ref 0.4–2)
BUN SERPL-MCNC: 41.1 MG/DL — HIGH (ref 8–20)
CALCIUM SERPL-MCNC: 9.2 MG/DL — SIGNIFICANT CHANGE UP (ref 8.6–10.2)
CHLORIDE SERPL-SCNC: 113 MMOL/L — HIGH (ref 98–107)
CO2 SERPL-SCNC: 20 MMOL/L — LOW (ref 22–29)
CREAT SERPL-MCNC: 2 MG/DL — HIGH (ref 0.5–1.3)
EOSINOPHIL # BLD AUTO: 0.45 K/UL — SIGNIFICANT CHANGE UP (ref 0–0.5)
EOSINOPHIL NFR BLD AUTO: 5.9 % — SIGNIFICANT CHANGE UP (ref 0–6)
GLUCOSE SERPL-MCNC: 90 MG/DL — SIGNIFICANT CHANGE UP (ref 70–99)
HCT VFR BLD CALC: 30.2 % — LOW (ref 39–50)
HGB BLD-MCNC: 9.7 G/DL — LOW (ref 13–17)
IMM GRANULOCYTES NFR BLD AUTO: 0.4 % — SIGNIFICANT CHANGE UP (ref 0–1.5)
INR BLD: 1.6 RATIO — HIGH (ref 0.88–1.16)
LIDOCAIN IGE QN: 32 U/L — SIGNIFICANT CHANGE UP (ref 22–51)
LYMPHOCYTES # BLD AUTO: 1.29 K/UL — SIGNIFICANT CHANGE UP (ref 1–3.3)
LYMPHOCYTES # BLD AUTO: 17 % — SIGNIFICANT CHANGE UP (ref 13–44)
MAGNESIUM SERPL-MCNC: 1.9 MG/DL — SIGNIFICANT CHANGE UP (ref 1.6–2.6)
MCHC RBC-ENTMCNC: 30.9 PG — SIGNIFICANT CHANGE UP (ref 27–34)
MCHC RBC-ENTMCNC: 32.1 GM/DL — SIGNIFICANT CHANGE UP (ref 32–36)
MCV RBC AUTO: 96.2 FL — SIGNIFICANT CHANGE UP (ref 80–100)
MONOCYTES # BLD AUTO: 0.6 K/UL — SIGNIFICANT CHANGE UP (ref 0–0.9)
MONOCYTES NFR BLD AUTO: 7.9 % — SIGNIFICANT CHANGE UP (ref 2–14)
NEUTROPHILS # BLD AUTO: 5.16 K/UL — SIGNIFICANT CHANGE UP (ref 1.8–7.4)
NEUTROPHILS NFR BLD AUTO: 68.3 % — SIGNIFICANT CHANGE UP (ref 43–77)
NT-PROBNP SERPL-SCNC: 3677 PG/ML — HIGH (ref 0–300)
PLATELET # BLD AUTO: 154 K/UL — SIGNIFICANT CHANGE UP (ref 150–400)
POTASSIUM SERPL-MCNC: 3.9 MMOL/L — SIGNIFICANT CHANGE UP (ref 3.5–5.3)
POTASSIUM SERPL-SCNC: 3.9 MMOL/L — SIGNIFICANT CHANGE UP (ref 3.5–5.3)
PROT SERPL-MCNC: 7.4 G/DL — SIGNIFICANT CHANGE UP (ref 6.6–8.7)
PROTHROM AB SERPL-ACNC: 18.2 SEC — HIGH (ref 10.6–13.6)
RAPID RVP RESULT: SIGNIFICANT CHANGE UP
RBC # BLD: 3.14 M/UL — LOW (ref 4.2–5.8)
RBC # FLD: 13.6 % — SIGNIFICANT CHANGE UP (ref 10.3–14.5)
SARS-COV-2 RNA SPEC QL NAA+PROBE: SIGNIFICANT CHANGE UP
SODIUM SERPL-SCNC: 146 MMOL/L — HIGH (ref 135–145)
TROPONIN T SERPL-MCNC: <0.01 NG/ML — SIGNIFICANT CHANGE UP (ref 0–0.06)
TROPONIN T SERPL-MCNC: <0.01 NG/ML — SIGNIFICANT CHANGE UP (ref 0–0.06)
WBC # BLD: 7.57 K/UL — SIGNIFICANT CHANGE UP (ref 3.8–10.5)
WBC # FLD AUTO: 7.57 K/UL — SIGNIFICANT CHANGE UP (ref 3.8–10.5)

## 2021-06-24 PROCEDURE — 71045 X-RAY EXAM CHEST 1 VIEW: CPT | Mod: 26

## 2021-06-24 PROCEDURE — 99285 EMERGENCY DEPT VISIT HI MDM: CPT

## 2021-06-24 PROCEDURE — 99223 1ST HOSP IP/OBS HIGH 75: CPT

## 2021-06-24 PROCEDURE — 99223 1ST HOSP IP/OBS HIGH 75: CPT | Mod: 25

## 2021-06-24 PROCEDURE — 93010 ELECTROCARDIOGRAM REPORT: CPT

## 2021-06-24 PROCEDURE — 99223 1ST HOSP IP/OBS HIGH 75: CPT | Mod: GC

## 2021-06-24 PROCEDURE — 90937 HEMODIALYSIS REPEATED EVAL: CPT

## 2021-06-24 RX ORDER — METOPROLOL TARTRATE 50 MG
5 TABLET ORAL ONCE
Refills: 0 | Status: COMPLETED | OUTPATIENT
Start: 2021-06-24 | End: 2021-06-24

## 2021-06-24 RX ORDER — APIXABAN 2.5 MG/1
5 TABLET, FILM COATED ORAL
Refills: 0 | Status: DISCONTINUED | OUTPATIENT
Start: 2021-06-24 | End: 2021-06-25

## 2021-06-24 RX ORDER — FUROSEMIDE 40 MG
40 TABLET ORAL ONCE
Refills: 0 | Status: COMPLETED | OUTPATIENT
Start: 2021-06-24 | End: 2021-06-24

## 2021-06-24 RX ORDER — METOPROLOL TARTRATE 50 MG
5 TABLET ORAL EVERY 6 HOURS
Refills: 0 | Status: DISCONTINUED | OUTPATIENT
Start: 2021-06-24 | End: 2021-06-29

## 2021-06-24 RX ORDER — METOPROLOL TARTRATE 50 MG
25 TABLET ORAL EVERY 8 HOURS
Refills: 0 | Status: DISCONTINUED | OUTPATIENT
Start: 2021-06-24 | End: 2021-06-28

## 2021-06-24 RX ADMIN — Medication 40 MILLIGRAM(S): at 07:19

## 2021-06-24 RX ADMIN — Medication 25 MILLIGRAM(S): at 11:34

## 2021-06-24 RX ADMIN — Medication 5 MILLIGRAM(S): at 06:11

## 2021-06-24 RX ADMIN — Medication 25 MILLIGRAM(S): at 17:10

## 2021-06-24 RX ADMIN — APIXABAN 5 MILLIGRAM(S): 2.5 TABLET, FILM COATED ORAL at 22:17

## 2021-06-24 RX ADMIN — Medication 25 MILLIGRAM(S): at 22:16

## 2021-06-24 NOTE — H&P ADULT - NSHPREVIEWOFSYSTEMS_GEN_ALL_CORE
REVIEW OF SYSTEMS:  CONSTITUTIONAL: No fever, weight loss, or fatigue  EYES: No eye pain, visual disturbances, or discharge  ENT:  No difficulty hearing, tinnitus, vertigo; No sinus or throat pain  NECK: No pain or stiffness  RESPIRATORY: No cough, wheezing, chills or hemoptysis; No shortness of breath  CARDIOVASCULAR: No chest pain, palpitations, dizziness, or leg swelling  GASTROINTESTINAL: No abdominal or epigastric pain. No nausea, vomiting or hematemesis; No diarrhea or constipation  GENITOURINARY: No dysuria, frequency, hematuria, or incontinence  NEUROLOGICAL: No headaches, memory loss, loss of strength, numbness, or tremors  SKIN: No itching, burning, rashes, or lesions   MUSCULOSKELETAL: No joint pain or swelling; No muscle, back, or extremity pain

## 2021-06-24 NOTE — ED ADULT NURSE NOTE - OBJECTIVE STATEMENT
Patient A&Ox4 complaining of palpitations and SOB since last night.  Pt tachycardic with HR in 120s, O2 sat 99% on RA. Cardiac monitoring initiated. Pt with cardiac hx of triple bypass, stroke and "irregular heartbeat".  Pt with AV fistula to left arm, bruit and thrill present.  Pt received HD tuesday, thrusday and Saturday.  respirations even and labored.  ELSA Sun at bedside for eval.  Denies CP, fever, chills.

## 2021-06-24 NOTE — H&P ADULT - NSHPPHYSICALEXAM_GEN_ALL_CORE
PHYSICAL EXAM:  GENERAL: Pt lying in bed comfortably in NAD  HEAD:  Atraumatic, normocephalic  ENT: Moist mucous membranes  NECK: No JVD  CHEST/LUNG: Diminished breath sounds left lower lobe; No rales, rhonchi, wheezing, or rubs.   HEART: S1, S2, tachycardic and irregular   ABDOMEN: Bowel sounds present; Soft, Nontender, Nondistended. No guarding or rigidity   EXTREMITIES:  2+ Peripheral Pulses, brisk capillary refill. No cyanosis, or edema  NERVOUS SYSTEM:  Alert & Oriented X3, speech clear, FROM x 4 extremities. No deficits   SKIN: Warm and dry

## 2021-06-24 NOTE — H&P ADULT - NSICDXFAMILYHX_GEN_ALL_CORE_FT
FAMILY HISTORY:  FH: hypertension    Mother  Still living? Unknown  Family history of cardiac disorder, Age at diagnosis: Age Unknown

## 2021-06-24 NOTE — CONSULT NOTE ADULT - ASSESSMENT
60 y/o M with a PMHx of Cocaine abuse (quit 2010), CAD s/p remote stent to LAD and recent CABG x 2 (DGV-OM, RPDA with Dr. Butler 11/2020), AI and MR s/p tAVR, tMVR (11/2020 Dr. Butler) with post-op Afib/flutter(on eliquis), HFrEF (EF 45-50% 02/21), Aortic dissection s/p repair (2010), surgery complicated by CVA w/ residual left sided weakness and bowel ischemia s/p bowel resection and ostomy with reversal repair, Covid infection, ESRD on HD (Tu/Thur/Sat) s/p LUE AVF, HTN, seizure disorder, GIB s/p colonoscopy and AVMs cauterization 3/2021, and psoriasis who presented to the ER with complaints of shortness of breath and palpitations x 3 days found to be fluid overloaded with symptomatic atrial flutter w/ RVR. EP called for arrythmia management.     1. Atrial fibrillation/flutter  -[id number] =4 (CHF, CVA +2, PAD) Continue with eliquis for stroke prevention  -Continue telemetry monitoring  -Rate control with Metoprolol, uptitrate as BP will allow  -Possible MUSTAPHA/DCCV for rhythm control to be d/w Attending    -Full recommendations to follow    62 y/o M with a PMHx of Cocaine abuse (quit 2010), CAD s/p remote stent to LAD and recent CABG x 2 (DGV-OM, RPDA with Dr. Butler 11/2020), AI and MR s/p tAVR, tMVR (11/2020 Dr. Butler) with post-op Afib/flutter(on eliquis), HFrEF (EF 45-50% 02/21), Aortic dissection s/p repair (2010), surgery complicated by CVA w/ residual left sided weakness and bowel ischemia s/p bowel resection and ostomy with reversal repair, Covid infection, ESRD on HD (Tu/Thur/Sat) s/p LUE AVF, HTN, seizure disorder, GIB s/p colonoscopy and AVMs cauterization 3/2021, and psoriasis who presented to the ER with complaints of shortness of breath and palpitations x 3 days found to be fluid overloaded with symptomatic atrial flutter w/ RVR. EP called for arrythmia management.     1. Atrial fibrillation/flutter  -[id number] =4 (CHF, CVA +2, PAD) Continue with eliquis for stroke prevention  -Continue telemetry monitoring  -Rate control with Metoprolol, uptitrate as BP will allow  -Will plan for MUSTAPHA/DCCV in AM   -Outpatient follow up with Dr Virk with [facility] for continued monitoring     -Full recommendations to follow

## 2021-06-24 NOTE — H&P ADULT - ASSESSMENT
62 y/o M w/ PMHx ESRD on HD on Tu/Thur/Sat (Pt missed scheduled dialysis on 06/22) s/p LUE AVF, CAD s/p remote PCI, AT/Aflutter, aortic insufficiency and mitral regurgitation s/p bioAVR and bioMVR, CABG, HFrEF (EF 40%), ascending aortic dissection s/p surgery complicated by CVA with residual left-sided weakness, colon resection for bowel resection s/p ostomy with reversal, also hx HTN, seizure disorder, psoriasis c/o sob and palpitations x 3 days. Left lung sounds diminished likely d/t skipped dialysis. Pt in AFlutter given metoprolol with improvement in HR. Pt admitted to Medicine on telemetry for dialysis and further workup.    ESRD  - Consult Nephrology  - Pt to be dialyzed     HFrEF   - C/w IV lasix  - Monitor I&Os  - CXR noting interstitial edema    AVR/MVR  - 60 y/o M w/ PMHx ESRD on HD on Tu/Thur/Sat (Pt missed scheduled dialysis on 06/22) s/p LUE AVF, CAD s/p remote PCI, AT/Aflutter, aortic insufficiency and mitral regurgitation s/p bioAVR and bioMVR, CABG, HFrEF (EF 40%), ascending aortic dissection s/p surgery complicated by CVA with residual left-sided weakness, colon resection for bowel resection s/p ostomy with reversal, also hx HTN, seizure disorder, psoriasis c/o sob and palpitations x 3 days. Left lung sounds diminished likely d/t skipped dialysis. Pt in AFlutter given metoprolol with improvement in HR. Pt admitted to Medicine on telemetry for dialysis and further workup.    Acute Decompensated HFrecEF  - TTE (02/2021) HFrEF 45%   - IV lasix x1 dose  - Strict I&Os and daily weights  - Monitor on telemetry  - Rpt CXR     ESRD  - HD T Th and Sat  - AVF LUE  - Nephrology consulted  - Pt to be dialyzed     Atrial Fibrillation with RVR  - C/w metoprolol and Eliquis  - Patient overloaded due to missed HD session today  - Usually with hypotension on HD days making increasing metoprolol difficult at this time.   - Amiodarone 200mg PO qday as per cardio  - Cardio recs appreciated  - EP consult  - Continue telemetry monitoring    Anemia  - H&H 9.7/30.2 continue to monitor    CAD  - S/p CABG x 2  - Continue aspirin, statin, and metoprolol     HTN  - C/w metoprolol    Seizure Hx  - C/w keppra    CVA Hx residual L sided weakness  - C/w Eliquis stroke ppx    DVT ppx: On Eliquis, ambulation.   60 y/o M w/ PMHx ESRD on HD on Tu/Thur/Sat (Pt missed scheduled dialysis on 06/22) s/p LUE AVF, CAD s/p remote PCI, AT/Aflutter, aortic insufficiency and mitral regurgitation s/p bioAVR and bioMVR, CABG, HFrEF (EF 40%), ascending aortic dissection s/p surgery complicated by CVA with residual left-sided weakness, colon resection for bowel resection s/p ostomy with reversal, also hx HTN, seizure disorder, psoriasis c/o sob and palpitations x 3 days, skipped dialysis on Tuesday admitted with  symtomatic AFlutter RVR.       Atrial Fibrillation/Flutter with RVR    - symptomatic - C/w metoprolol and Eliquis  - Patient overloaded due to missed HD session today  - Usually with hypotension on HD days making increasing metoprolol difficult at this time.   - Cardio recs appreciated - pt not on amiodarone per pharmacy communication - listed by patient,   - EP consult  - Continue telemetry monitoring, monitor electrlytes      ESRD  - HD T Th and Sat  - AVF LUE  - Nephrology consulted  - Pt to be dialyzed today     Acute Decompensated HFrecEF  - TTE (02/2021) HFrEF 45%   - IV lasix x1 dose, hold off further doses   - Strict I&Os and daily weights  - Monitor on telemetry  - Rpt CXR   Anemia  - H&H 9.7/30.2 continue to monitor    CAD  - S/p CABG x 2  - Continue aspirin, statin, and metoprolol     HTN  - C/w metoprolol    Seizure Hx  - C/w keppra    CVA Hx residual L sided weakness  - C/w Eliquis stroke ppx    DVT ppx: On Eliquis, ambulation.

## 2021-06-24 NOTE — ED PROVIDER NOTE - CARE PLAN
Principal Discharge DX:	ESRD on dialysis  Secondary Diagnosis:	CHF (congestive heart failure)  Secondary Diagnosis:	Palpitations  Secondary Diagnosis:	Shortness of breath

## 2021-06-24 NOTE — ED ADULT TRIAGE NOTE - CHIEF COMPLAINT QUOTE
pt amb to ED c/o shortness of breath started last night, woke up and was much worse. pt is out of breath while speaking. anxious, fidgeting. states he was supposed to see his heart doctor today because he had a recent abnormal ekg. denies chest pain. missed Tuesday dialysis because he didn't feel good, due for dialysis this am.

## 2021-06-24 NOTE — CONSULT NOTE ADULT - ASSESSMENT
A/P:  62 y/o M with a PMHx of CAD s/p CABG x 2, AI and MR s/p tAVR, tMVR, HFrecEF (EF 45-50% 02/21), ascending aortic dissection s/p repair c/b by CVA with residual right sided weakness, Afib/flutter (on coumadin), Covid, ESRD on HD (Tu/Thur/Sat) s/p LUE AVF, colon resection s/p ostomy with reversal, HTN, seizure disorder, and psoriasis who presented to the ER with complaints of shortness of breath and palpitations. Patient states that he was supposed to go to HD on Tuesday, but he was experiencing palpitations and felt his heart going fast so he didn't feel safe driving. Patient states that the following 2 days he began to have worsening shortness of breath and orthopnea, so he came to the ER to be evaluated. Patient was recently restarted on amiodarone 200mg PO qday for rate control of his Afib, as patient often with hypotension with HD making increasing AV nodals difficult. Patient also notes some severe leg cramping during HD as well. Patient denies fevers, chills, syncope, chest pressure, abdominal pain, N/V/D, headache, or dizziness.     Acute Decompensated HFrecEF  - Decompensated due to missed HD session.   - HD today for fluid removal.   - Also with afib with RVR.   - Patient's BP elevated at this time but usually low during HD.   - Continue metoprolol 25mg PO BID at this time.   - Monitor on telemetry.  -  Strict i/o and daily weights.    Atrial Fibrillation with RVR  - On metoprolol 25mg PO BID and Amiodarone 200mg PO qday.   - Patient overloaded due to missed HD session today.   - Usually with hypotension on HD days making increasing metoprolol difficult at this time.   - EP consult.   - Continue coumadin for AC.   - INR goal 2-3.   - Continue telemetry monitoring.     CAD  - S/p CABG x 2.   - Continue aspirin, statin, and metoprolol.     Assessment and recommendations are final when note is signed by the attending.  A/P:  62 y/o M with a PMHx of CAD s/p CABG x 2, AI and MR s/p tAVR, tMVR, HFrecEF (EF 45-50% 02/21), ascending aortic dissection s/p repair c/b by CVA with residual right sided weakness, Afib/flutter (on coumadin), Covid, ESRD on HD (Tu/Thur/Sat) s/p LUE AVF, colon resection s/p ostomy with reversal, HTN, seizure disorder, and psoriasis who presented to the ER with complaints of shortness of breath and palpitations. Patient states that he was supposed to go to HD on Tuesday, but he was experiencing palpitations and felt his heart going fast so he didn't feel safe driving. Patient states that the following 2 days he began to have worsening shortness of breath and orthopnea, so he came to the ER to be evaluated. Patient was recently restarted on amiodarone 200mg PO qday for rate control of his Afib, as patient often with hypotension with HD making increasing AV nodals difficult. Patient also notes some severe leg cramping during HD as well. Patient denies fevers, chills, syncope, chest pressure, abdominal pain, N/V/D, headache, or dizziness.     Acute Decompensated HFrecEF  - Decompensated due to missed HD session.   - HD today for fluid removal.   - Also with afib with RVR.   - Patient's BP elevated at this time but usually low during HD.   - Continue metoprolol 25mg PO BID at this time.   - Monitor on telemetry.  -  Strict i/o and daily weights.    Atrial Fibrillation with RVR  - On metoprolol 25mg PO BID.   - Was supposed to be started on Amiodarone 200mg PO qday per last office note, but patient never started.   - Patient overloaded due to missed HD session today.   - Usually with hypotension on HD days making increasing metoprolol difficult at this time.   - EP consult.   - Continue coumadin for AC.   - INR goal 2-3.   - Continue telemetry monitoring.     CAD  - S/p CABG x 2.   - Continue aspirin, statin, and metoprolol.     Assessment and recommendations are final when note is signed by the attending.

## 2021-06-24 NOTE — ED ADULT TRIAGE NOTE - BSA (M2)
----- Message from Armando Hooks MD sent at 3/28/2017  2:39 PM EDT -----  Tell patient the diagnostic mammogram and ultrasound were both totally normal.  I still want her to pay attention if she sees any further changes let me know.  Otherwise I'll see her in June for annual check  
Pt notified  
1.92

## 2021-06-24 NOTE — CONSULT NOTE ADULT - ATTENDING COMMENTS
Pt is seen, examined, chart reviewed, d/w np/pa.  Management as outlined above.  Acute Decompensated HFrecEF: Decompensated due to missed HD session. HD today for fluid removal.   AF RVR: Patient's BP elevated at this time but usually low during HD. Cont BB, Amio, Coumadin.  EP consult.    CAD:  S/p CABG x 2: COnt asa, statin
seen and examined, seems like patient has been in persistent AF (both AFIL and AFIB, but mostly AFL) since the surgery in November 2020. No prior DCCV, but was started on amiodarone which he confirmed compliance with, though this has been questioned in other notes    1. Will likely benefit from an attempt for rhythm control. Rate control has been and will continue to be difficult as he is mostly in AFL and not in AFIB. This has been complicating his IHD  2. NPO MN for MUSTAPHA DCCV tomorrow  3. will plan to c/w amiodarone and AC  4. Will consider outpatient AF ablation  5. will plan follow up with me in 3 weeks with one week MCOT prior       above d/w pt

## 2021-06-24 NOTE — ED PROVIDER NOTE - PSH
Aorta disorder    H/O colectomy    S/P AVR (aortic valve replacement)    S/P MVR (mitral valve replacement)    Status post double vessel coronary artery bypass

## 2021-06-24 NOTE — ED PROVIDER NOTE - CLINICAL SUMMARY MEDICAL DECISION MAKING FREE TEXT BOX
60 y/o male with hx of ESRD on HD on Tu/Thur/Sat  s/p LUE AVF, CAD s/p remote PCI, AT/Aflutter, aortic insufficiency and mitral regurgitation s/p bioAVR and bioMVR, CABG, on coumadin, HFrEF (EF 40%), ascending aortic dissection s/p surgery complicated by CVA with residual left-sided weakness, colon resection for bowel resection s/p ostomy with reversal, also hx HTN, seizure disorder, psoriasis presents to the ED c/o shortness of breath and heart palpitations for the past 3 days. Likely due to missed dialysis, left sided of lungs diminished, hr improved with metoprolol, will admit for dialysis and further workup 60 y/o male with hx of ESRD on HD on Tu/Thur/Sat  s/p LUE AVF, CAD s/p remote PCI, AT/Aflutter, aortic insufficiency and mitral regurgitation s/p bioAVR and bioMVR, CABG, HFrEF (EF 40%), ascending aortic dissection s/p surgery complicated by CVA with residual left-sided weakness, colon resection for bowel resection s/p ostomy with reversal, also hx HTN, seizure disorder, psoriasis presents to the ED c/o shortness of breath and heart palpitations for the past 3 days. Likely due to missed dialysis, left sided of lungs diminished, hr improved with metoprolol, will admit for dialysis and further workup

## 2021-06-24 NOTE — ED PROVIDER NOTE - OBJECTIVE STATEMENT
62 y/o male with hx of ESRD on HD on Tu/Thur/Sat  s/p LUE AVF, CAD s/p remote PCI, AT/Aflutter, aortic insufficiency and mitral regurgitation s/p bioAVR and bioMVR, CABG, on coumadin, HFrEF (EF 40%), ascending aortic dissection s/p surgery complicated by CVA with residual left-sided weakness, colon resection for bowel resection s/p ostomy with reversal, also hx HTN, seizure disorder, psoriasis presents to the ED c/o shortness of breath and heart palpitations for the past 3 days. Pt was scheduled for dialysis on tuesday but missed it due tot he fact that he wasn't feeling well. Saw his PMD had EKG done and was advised to f/u with Cardio. Pt was scheduled to see cardio today but had worsening shortness of breath and palpitations. Admits to swelling of the left lower leg. Pt denies chest pain, abdominal pain, fevers, chills, productive cough. 60 y/o male with hx of ESRD on HD on Tu/Thur/Sat  s/p LUE AVF, CAD s/p remote PCI, AT/Aflutter, aortic insufficiency and mitral regurgitation s/p bioAVR and bioMVR, CABG, HFrEF (EF 40%), ascending aortic dissection s/p surgery complicated by CVA with residual left-sided weakness, colon resection for bowel resection s/p ostomy with reversal, also hx HTN, seizure disorder, psoriasis presents to the ED c/o shortness of breath and heart palpitations for the past 3 days. Pt was scheduled for dialysis on tuesday but missed it due tot he fact that he wasn't feeling well. Saw his PMD had EKG done and was advised to f/u with Cardio. Pt was scheduled to see cardio today but had worsening shortness of breath and palpitations. Admits to swelling of the left lower leg. Pt denies chest pain, abdominal pain, fevers, chills, productive cough.

## 2021-06-24 NOTE — CONSULT NOTE ADULT - SUBJECTIVE AND OBJECTIVE BOX
[place] [facility]                                                               Office:  [address] [address]                                                              Telephone: [phone]. Fax:[phone]                                                                        CARDIOLOGY CONSULTATION NOTE                                                                                             Consult requested by:  Dr. Garrett  Reason for Consultation: Dyspnea  Primary Cardiologist: Dr. Frankel  History obtained by: Patient and medical record   obtained: No    Covid Status: Pending    Chief complaint:    Patient is a 61y old  Male who presents with a chief complaint of sob and palpitations x 3 days (24 Jun 2021 08:32)    HPI: 62 y/o M with a PMHx of CAD s/p CABG x 2, AI and MR s/p tAVR, tMVR, HFrecEF (EF 45-50% 02/21), ascending aortic dissection s/p repair c/b by CVA with residual right sided weakness, Afib/flutter (on coumadin), Covid, ESRD on HD (Tu/Thur/Sat) s/p LUE AVF, colon resection s/p ostomy with reversal, HTN, seizure disorder, and psoriasis who presented to the ER with complaints of shortness of breath and palpitations. Patient states that he was supposed to go to HD on Tuesday, but he was experiencing palpitations and felt his heart going fast so he didn't feel safe driving. Patient states that the following 2 days he began to have worsening shortness of breath and orthopnea, so he came to the ER to be evaluated. Patient was recently restarted on amiodarone 200mg PO qday for rate control of his Afib, as patient often with hypotension with HD making increasing AV nodals difficult. Patient also notes some severe leg cramping during HD as well. Patient denies fevers, chills, syncope, chest pressure, abdominal pain, N/V/D, headache, or dizziness.     REVIEW OF SYMPTOMS:     CONSTITUTIONAL: No fever, weight loss, or fatigue  ENMT:  No difficulty hearing, tinnitus, vertigo; No sinus or throat pain  NECK: No pain or stiffness  CARDIOVASCULAR: AS PER HPI  RESPIRATORY: AS PER HPI  : No dysuria, no hematuria   GI: No dark color stool, no melena, no diarrhea, no constipation, no abdominal pain   NEURO: No headache, no dizziness, no slurred speech   MUSCULOSKELETAL: No joint pain or swelling; No muscle, back, or extremity pain  PSYCH: No agitation, no anxiety.    ALL OTHER REVIEW OF SYSTEMS ARE NEGATIVE.      PREVIOUS DIAGNOSTIC TESTING  ECHO FINDINGS:  Echo 02/2021  EF 45  50%, basal anteroseptal segment is akinetic, mid anteroseptal segment and basal inferoseptal segment are hypokinetic. Mild concentric LVH, moderate TR, Dilated aorta at sinuses of valsalva (4.0 cm) an ascending aorta (4.3 cm)    CATHETERIZATION FINDINGS:   < from: Cardiac Cath Lab - Adult (11.10.20 @ 07:43) >  VALVES: AORTIC VALVE: There was moderate to severe aortic insufficiency.  MITRAL VALVE: The mitral valve exhibited moderate to severe regurgitation.  CORONARY VESSELS: The coronary circulation is right dominant.  LM:   --  LM: The left anterior descending and circumflex arteries arose  anomalously from separate ostia.  LAD:   --  LAD: Angiography showed moderate atherosclerosis.  CX:   --  Proximal circumflex: There was a discrete 60 % stenosis.  --  OM1: The vessel was small to medium sized. There was a discrete 80 %  stenosis. The lesion was irregularly contoured and eccentric.  --  OM2: The vessel was medium to large sized. There was a discrete 80 %  stenosis. The lesion was irregularly contoured, eccentric, and heavily  calcified.  RCA:   --  Proximal RCA: There was a 100 % stenosis. This lesion is a  chronic total occlusion. The distal vessel fills via collaterals from the  LCA.  AORTA: The root exhibited dilatation. Ascending aorta: The segment was  normal in size. Graft repair intact. Aortic arch: Normal. Known residual  Type B dissection by CTA. Descending aorta: The segment was normal in  size. Known residual Type B dissection by CTA.  ARCH VESSELS: Innominate: Known residual Type B dissection by CTA. Proximal  left subclavian: Known residual Type B dissection by CTA. LIMA: Normal.  TAMMY: Normal.  COMPLICATIONS: No complications occurred during the cath lab visit.  SUMMARY:  Summary: Addendum 11/11/20: Case reconfirmed to remove Bypass Graph Study,  duplicate Left Subclavian procedure,change contrast from 1665 to 165 and  add Inominate Angiography procedure  DIAGNOSTIC IMPRESSIONS: - Patent ascending aortic graft  - Severe 2 vessel CAD with chronically occluded proximal RCA. There are   brisk collaterals from the LCA.  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 Butler later this week  - [facility] will continue to follow  INTERVENTIONAL IMPRESSIONS: - Patent ascending aortic graft  - Severe 2 vessel CAD with chronically occluded proximal RCA. There are   brisk collaterals from the LCA.  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 Butler later this week  - [facility] will continue to follow  Prepared and signed by  Celso Stockton MD  Signed 11/11/2020 09:33:32    < end of copied text >        ALLERGIES: Allergies    penicillin (Other)    Intolerances        PAST MEDICAL HISTORY  CHF (congestive heart failure)    CVA (cerebral vascular accident)    Chronic kidney disease    Seizures    HTN (hypertension)    Hyperlipemia    COVID-19    Atrial fibrillation    ESRD on dialysis    Former smoker    History of cocaine use    H/O aortic dissection    H/O aortic valve insufficiency    Mitral regurgitation        PAST SURGICAL HISTORY  Aorta disorder    H/O colectomy    Status post double vessel coronary artery bypass    S/P AVR (aortic valve replacement)    S/P MVR (mitral valve replacement)        FAMILY HISTORY:  FH: hypertension    Family history of cardiac disorder (Mother)  mother        SOCIAL HISTORY:  Former smoker. Former cocaine use.     CURRENT MEDICATIONS:         HOME MEDICATIONS:  Home Medications:  ascorbic acid 500 mg oral tablet: 1 tab(s) orally once a day (13 Mar 2021 01:29)  Coumadin 5 mg oral tablet: 1 tab(s) orally once a day (13 Mar 2021 01:29)  metoprolol succinate 25 mg oral tablet, extended release: 1 tab(s) orally once a day on dialysis days (Tuesday/Thursday/Saturday) (13 Mar 2021 01:29)  metoprolol tartrate 25 mg oral tablet: 1 tab(s) orally every 12 hours on non-dialysis days (Sunday/Monday/Wednesday/Friday) (13 Mar 2021 01:29)  pantoprazole 40 mg oral delayed release tablet: 1 tab(s) orally once a day (13 Mar 2021 01:29)  sevelamer hydrochloride 800 mg oral tablet: 1 tab(s) orally 2 times a day (with meals) (13 Mar 2021 01:29)      Vital Signs Last 24 Hrs  T(C): 36.8 (24 Jun 2021 05:26), Max: 36.8 (24 Jun 2021 05:26)  T(F): 98.3 (24 Jun 2021 05:26), Max: 98.3 (24 Jun 2021 05:26)  HR: 123 (24 Jun 2021 07:18) (123 - 130)  BP: 155/81 (24 Jun 2021 07:18) (155/81 - 173/106)  RR: 18 (24 Jun 2021 07:56) (16 - 29)  SpO2: 100% (24 Jun 2021 07:56) (93% - 100%)      PHYSICAL EXAM:  Constitutional: Comfortable . No acute distress.   HEENT: Atraumatic and normocephalic , neck is supple . no JVD. No carotid bruit. PEERL   CNS: A&Ox3. No focal deficits. EOMI. Cranial nerves II-IX are intact.   Lymph Nodes: Cervical : Not palpable.  Respiratory: Bibasilar rales  Cardiovascular: Tachycardic, irregularly irregular. No rubs or gallop. II/VI systolic murmur lsb  Gastrointestinal: Soft non-tender and non distended . +Bowel sounds. negative Garcia's sign.  Extremities: + B/l LE edema.   Psychiatric: Calm . no agitation.  Skin: No skin rash/ulcers visualized to face, hands or feet.    Intake and output:     LABS:                        9.7    7.57  )-----------( 154      ( 24 Jun 2021 06:43 )             30.2     06-24    146<H>  |  113<H>  |  41.1<H>  ----------------------------<  90  3.9   |  20.0<L>  |  2.00<H>    Ca    9.2      24 Jun 2021 06:43  Mg     1.9     06-24    TPro  7.4  /  Alb  4.2  /  TBili  1.6  /  DBili  x   /  AST  19  /  ALT  18  /  AlkPhos  103  06-24    CARDIAC MARKERS ( 24 Jun 2021 06:43 )  x     / <0.01 ng/mL / x     / x     / x        ;p-BNP=Serum Pro-Brain Natriuretic Peptide: 3677 pg/mL (06-24 @ 06:43)    PT/INR - ( 24 Jun 2021 06:43 )   PT: 18.2 sec;   INR: 1.60 ratio         PTT - ( 24 Jun 2021 06:43 )  PTT:40.7 sec      INTERPRETATION OF TELEMETRY: Reviewed by me. Afib -130s  ECG: Reviewed by me. Afib with RVR, LAFB, LVH, NSST/T wave abnormalities    RADIOLOGY & ADDITIONAL STUDIES:    X-ray:  reviewed by me.   < from: Xray Chest 1 View- PORTABLE-Urgent (06.24.21 @ 06:56) >   EXAM:  XR CHEST PORTABLE URGENT 1V                          PROCEDURE DATE:  06/24/2021          INTERPRETATION:  Clinical history: 61-year-old male, chest pain.    Two expiratory/kyphotic views are compared to 11/30/2020 and are correlated with the abdominal CT of 3/12/2021.    Mild cardiomegaly and normal pulmonary vasculature with no gross consolidation, effusion, pneumothorax or acute osseous finding.    Mild-to-moderate perihilar interstitial prominence is most consistent with edema/infiltrate.    Patient is post sternotomy/CABG/cardiac valve surgery.    IMPRESSION:  Moderate interstitial edema/infiltrate with no gross consolidation, new    < end of copied text >       Burow's Advancement Flap Text: The defect edges were debeveled with a #15 scalpel blade.  Given the location of the defect and the proximity to free margins a Burow's advancement flap was deemed most appropriate.  Using a sterile surgical marker, the appropriate advancement flap was drawn incorporating the defect and placing the expected incisions within the relaxed skin tension lines where possible.    The area thus outlined was incised deep to adipose tissue with a #15 scalpel blade.  The skin margins were undermined to an appropriate distance in all directions utilizing iris scissors.

## 2021-06-24 NOTE — H&P ADULT - HISTORY OF PRESENT ILLNESS
HPI: 62 y/o M w/ a PMHx of ESRD on HD on Tu/Thur/Sat s/p LUE AVF, CAD s/p remote PCI, AT/Aflutter, aortic insufficiency and mitral regurgitation s/p bioAVR and bioMVR, CABG, HFrEF (EF 40%), ascending aortic dissection s/p surgery complicated by CVA with residual left-sided weakness, colon resection for bowel resection s/p ostomy with reversal, also hx HTN, seizure disorder, psoriasis presented to [facility] ED c/o sob and palpitations x 3 days. Pt states the palpitations began sunday night and the sob started yesterday. The pt said he felt "off" with associated dizziness and shaking when he was having palpitations. The pt missed his scheduled dialysis on tuesday 06/22. Pt went to see his PCP and did an EKG, recommended F/U with cardio but presented to the ED instead due to worsening sob. Pt was found resting comfortably in NAD upon arrival for examination. Pt has also been experienciong B/L LE cramping during dialysis and had to end the session early due to discomfort. Pt admits to recent consumption of electrolyte power supplements of a few servings in a day because of the cramping his doctor believed to be due to electrolytes. Pt denies fever, chills, HA, blurry vision, cough, chest pain, n/v/d, abdominal pain, back pain, leg pain/swelling, recent drug/stimulant use.    PAST MEDICAL & SURGICAL HISTORY:  CHF (congestive heart failure)  CVA (cerebral vascular accident)  Seizures, last seizure 10 years ago  HTN (hypertension)  Hyperlipemia  COVID-19 october 2020  Atrial fibrillation  ESRD on dialysis (Tu/Thurs/Sat)  Former smoker  History of cocaine use  remote hx, currently sober  H/O aortic dissection  s/p repair (2010), surgery complicated by bowel ischemia s/p bowel resection and ostomy with reversal  H/O aortic valve insufficiency  Mitral regurgitation  Aorta disorder  H/O colectomy  Status post double vessel coronary artery bypass  S/P AVR (aortic valve replacement)  S/P MVR (mitral valve replacement) HPI: 62 y/o M w/ a PMHx of ESRD on HD on Tu/Thur/Sat s/p LUE AVF, CAD s/p remote PCI, AT/Aflutter, aortic insufficiency and mitral regurgitation s/p bioAVR and bioMVR, CABG, HFrEF (EF 40%), ascending aortic dissection s/p surgery complicated by CVA with residual left-sided weakness, colon resection for bowel resection s/p ostomy with reversal, also hx HTN, seizure disorder, psoriasis presented to [facility] ED c/o sob and palpitations x 3 days. Pt states the palpitations began sunday night and the sob started yesterday. The pt said he felt "off" with associated dizziness and shaking when he was having palpitations. The pt missed his scheduled dialysis on tuesday 06/22. Pt went to see his PCP and did an EKG, recommended F/U with cardio but presented to the ED instead due to worsening sob. Pt was found resting comfortably in NAD upon arrival for examination. Pt has also been experienciong B/L LE cramping during dialysis,  Pt admits to recent consumption of electrolyte power supplements of a few servings in a day to correct his electrolytes and cramping. Pt denies fever, chills, HA, blurry vision, cough, chest pain, n/v/d, abdominal pain, back pain, leg pain/swelling, recent drug/stimulant use.    Of note, he was admitted in March with Gi bleed - s/p colonoscopy and AVMs cauterization. Now denies any bleeding in stools.

## 2021-06-24 NOTE — ED PROVIDER NOTE - FAMILY HISTORY
FH: hypertension     Mother  Still living? Unknown  Family history of cardiac disorder, Age at diagnosis: Age Unknown

## 2021-06-24 NOTE — H&P ADULT - ATTENDING COMMENTS
pt seen and examined at bedside with PA student     61yr old male with PMH of ESRD, Afib on Eliquis, h/o CVA, HFrEF, CAD s/p CABG, PCI, h/o bioprosthetic Aortic and mitral valve replacement presented today with worsening shortness of breath and missed dialysis 2 days ago. In ER, he was found to be in Afib/futter RVR with HR in high 120s. was admitted with symptomatic Afib RVR and mild systolic CHF.   on exam  In no acute distress, sitting up in bed, mildly tachypneic   HEENT - normal, pallor+  RS: bilateral reduced BS at bases  CVS - TAchycardic, irregular  abdomen - soft, BS+  trace ankle edema     plan:  Afib RVR - metop, tele, cardio, EP, eliquis  acute on chronic HFrEF - IHD today   ESRD - IHD today   h/o CVA - left sided hemiparesis   Anemia - chronic - possbily 2/2 ESRD and hx Gi bleed - monitor  DVt px - on eliquis     Rest agree with above and edits done

## 2021-06-24 NOTE — CONSULT NOTE ADULT - SUBJECTIVE AND OBJECTIVE BOX
[place] CARDIAC ELECTROPHYSIOLOGY  [facility]   Office: [address] [address]  Telephone: [phone] Fax:[phone]      HPI:  60 y/o M with a PMHx of CAD s/p remote stent to LAD and recent CABG x 2 (DGV-OM, RPDA with Dr. Butler 2020), AI and MR s/p tAVR, tMVR (2020 Dr. Butler), HFrEF (EF 45-50% ), Aortic dissection s/p repair (), surgery complicated by CVA w/ residual left sided weakness and bowel ischemia s/p bowel resection and ostomy with reversal repair, Afib/flutter (on coumadin), ESRD on HD (Tu/Thur/Sat) s/p LUE AVF, HTN, seizure disorder, and psoriasis who presented to the ER with complaints of shortness of breath and palpitations x 3 days. In ED, patient with noted elevated BNP (3K), CXR with moderate interstitial edema and in atrial flutter w/ RVR. EP called for arrythmia management.     Patient admits to missing his HD on Tuesday.  Patient was recently seen by cardiology and restarted on amiodarone 200mg daily but admits to not taking. Patient also with known with known hypotension with HD making increasing AV nodals difficult. Patient denies fevers, chills, syncope, chest pressure, abdominal pain, N/V/D, headache, or dizziness.       Cardiologist: Dr. Frankel  PCP: Dr Yo    Tele:  EK2021; Atrial flutter w/ RVR   Cardiac cath 11/10/20: severe 2 vessel CAD.  Chronically occluded proximal RCA with collaterals from the LCA.  EF 50%.  Elevated LVEDP.    PAST MEDICAL & SURGICAL HISTORY:  CHF (congestive heart failure)  CVA (cerebral vascular accident)  Seizures  last seizure 10 years ago  HTN (hypertension)  Hyperlipemia  COVID-19  2020  Atrial fibrillation  ESRD on dialysis  /Thurs/Sat  Former smoker  History of cocaine use  remote hx, currently sober  H/O aortic dissection  s/p repair (), surgery complicated by bowel ischemia s/p bowel resection and ostomy with reversal  H/O aortic valve insufficiency  Mitral regurgitation  Aorta disorder  H/O colectomy  Status post double vessel coronary artery bypass  S/P AVR (aortic valve replacement)  S/P MVR (mitral valve replacement)      REVIEW OF SYSTEMS:    CONSTITUTIONAL:   NECK:   RESPIRATORY:   CARDIOVASCULAR: see HPI  GASTROINTESTINAL:   NEUROLOGICAL:   SKIN:   LYMPH NODES:   ENDOCRINE:   PSYCHIATRIC:   HEME/LYMPH:       MEDICATIONS  (STANDING):  metoprolol tartrate 25 milliGRAM(s) Oral every 8 hours    MEDICATIONS  (PRN):  metoprolol tartrate Injectable 5 milliGRAM(s) IV Push every 6 hours PRN HR >120      Allergies  penicillin (Other)    SOCIAL HISTORY:  Prior history of cocaine abuse  Social alcohol   Tobacco 30x 3 ppd history. quit in     FAMILY HISTORY:  FH: hypertension  Family history of cardiac disorder (Mother)  mother      Vital Signs Last 24 Hrs  T(C): 36.7 (2021 11:28), Max: 36.8 (2021 05:26)  T(F): 98.1 (2021 11:28), Max: 98.3 (2021 05:26)  HR: 125 (2021 11:28) (123 - 130)  BP: 135/78 (2021 11:28) (135/78 - 173/106)  BP(mean): --  RR: 18 (2021 11:28) (16 - 29)  SpO2: 95% (2021 11:28) (93% - 100%)    Physical Exam:  Constitutional:   Neck:   Cardiovascular:   Pulmonary:   Abdomen:   Extremities:   Neuro:     LABS:                        9.7    7.57  )-----------( 154      ( 2021 06:43 )             30.2   06-24    146<H>  |  113<H>  |  41.1<H>  ----------------------------<  90  3.9   |  20.0<L>  |  2.00<H>    Ca    9.2      2021 06:43  Mg     1.9     06-24    TPro  7.4  /  Alb  4.2  /  TBili  1.6  /  DBili  x   /  AST  19  /  ALT  18  /  AlkPhos  103  06-24  LIVER FUNCTIONS - ( 2021 06:43 )  Alb: 4.2 g/dL / Pro: 7.4 g/dL / ALK PHOS: 103 U/L / ALT: 18 U/L / AST: 19 U/L / GGT: x           PT/INR - ( 2021 06:43 )   PT: 18.2 sec;   INR: 1.60 ratio         PTT - ( 2021 06:43 )  PTT:40.7 secCARDIAC MARKERS ( 2021 06:43 )  x     / <0.01 ng/mL / x     / x     / x              RADIOLOGY & ADDITIONAL STUDIES:    PREVIOUS DIAGNOSTIC TESTING  Echo 2021 (Office)  EF 45-50%, basal anteroseptal segment is akinetic, mid anteroseptal segment and basal inferoseptal segment are hypokinetic. Mild concentric LVH, moderate TR, Dilated aorta at sinuses of valsalva (4.0 cm) an ascending aorta (4.3 cm)    TTE 2020  Summary:   1. Left ventricular ejection fraction, by visual estimation, is 40%%.   2. Technically adequate study.   3. Moderately decreased global left ventricular systolic function.   4. Multiple left ventricular regional wall motion abnormalities exist. See wall motion findings.   5. The left ventricular diastolic function could not be assessed in this study.   6. Moderately reduced RV systolic function.   7. Trivial pericardial effusion.   8. A bioprosthetic valve is present in the mitral position.   9. Trace mitral valve regurgitation.  10. Moderate tricuspid regurgitation.  11. Bioprosthesis in the aortic position.  12. Estimated pulmonary artery systolic pressure is 40.9 mmHg assuming a right atrial pressure of 8 mmHg, which is consistent with mild pulmonary hypertension.  13. Endocardial visualization was enhanced with intravenous echo contrast.  14. Mitral valve mean gradient is 2.2 mmHg consistent with mild mitral stenosis.    MD Oriana Electronically signed on 2020 at 4:08:44 PM      CATHETERIZATION FINDINGS:   < from: Cardiac Cath Lab - Adult (11.10.20 @ 07:43) >    CORONARY VESSELS: The coronary circulation is right dominant.  LM:   --  LM: The left anterior descending and circumflex arteries arose  anomalously from separate ostia.  LAD:   --  LAD: Angiography showed moderate atherosclerosis.  CX:   --  Proximal circumflex: There was a discrete 60 % stenosis.  --  OM1: The vessel was small to medium sized. There was a discrete 80 %  stenosis. The lesion was irregularly contoured and eccentric.  --  OM2: The vessel was medium to large sized. There was a discrete 80 %  stenosis. The lesion was irregularly contoured, eccentric, and heavily  calcified.  RCA:   --  Proximal RCA: There was a 100 % stenosis. This lesion is a  chronic total occlusion. The distal vessel fills via collaterals from the  LCA.  AORTA: The root exhibited dilatation. Ascending aorta: The segment was  normal in size. Graft repair intact. Aortic arch: Normal. Known residual  Type B dissection by CTA. Descending aorta: The segment was normal in  size. Known residual Type B dissection by CTA.  ARCH VESSELS: Innominate: Known residual Type B dissection by CTA. Proximal  left subclavian: Known residual Type B dissection by CTA. LIMA: Normal.  TAMMY: Normal.  COMPLICATIONS: No complications occurred during the cath lab visit.  SUMMARY:  Summary: Addendum 20: Case reconfirmed to remove Bypass Graph Study,  duplicate Left Subclavian procedure,change contrast from 1665 to 165 and  add Inominate Angiography procedure  DIAGNOSTIC IMPRESSIONS: - Patent ascending aortic graft  - Severe 2 vessel CAD with chronically occluded proximal RCA. There are   brisk collaterals from the LCA.  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 Butler later this week  - [facility] will continue to follow  INTERVENTIONAL IMPRESSIONS: - Patent ascending aortic graft  - Severe 2 vessel CAD with chronically occluded proximal RCA. There are   brisk collaterals from the LCA.  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 Butler later this week  Prepared and signed by  Celso Stockton MD  Signed 2020 09:33:32         [place] CARDIAC ELECTROPHYSIOLOGY  [facility]   Office: [address] [address]  Telephone: [phone] Fax:[phone]      HPI:  60 y/o M with a PMHx of Cocaine abuse (quit ), CAD s/p remote stent to LAD and recent CABG x 2 (DGV-OM, RPDA with Dr. Butler 2020), AI and MR s/p tAVR, tMVR (2020 Dr. Butler) with post-op Afib/flutter(on eliquis), HFrEF (EF 45-50% ), Aortic dissection s/p repair (), surgery complicated by CVA w/ residual left sided weakness and bowel ischemia s/p bowel resection and ostomy with reversal repair, Covid infection, ESRD on HD (/Thur/Sat) s/p LUE AVF, HTN, seizure disorder, and psoriasis who presented to the ER with complaints of shortness of breath and palpitations x 3 days. In ED, patient with noted elevated BNP (3K), CXR with moderate interstitial edema and in atrial flutter w/ RVR. EP called for arrythmia management.     Pt reports AF/AFL began postoperatively following with CABG x 2/AVR/MVR in  and has been on and off amiodarone since (follows with Dr Frankel). He does reports intermittent palpitations since his surgery but states that symptoms usually short lived, however, four days ago patient developed similar palpitations with associated fatigue and SOB but never resolved which prompted his to come to emergency departemnt. Patient also with known with known hypotension with HD making increasing AV nodals difficult. Patient denies fevers, chills, syncope, chest pressure, abdominal pain, N/V/D, headache, or dizziness.     Cardiologist: Dr. Frankel  PCP: Dr Yo    Tele: NA (Pt seen in dialysis)   EK2021; Atrial flutter w/ RVR   Cardiac cath 11/10/20: severe 2 vessel CAD.  Chronically occluded proximal RCA with collaterals from the LCA.  EF 50%.  Elevated LVEDP.    PAST MEDICAL & SURGICAL HISTORY:  CHF (congestive heart failure)  CVA (cerebral vascular accident)  Seizures  last seizure 10 years ago  HTN (hypertension)  Hyperlipemia  COVID-19  2020  Atrial fibrillation  ESRD on dialysis  /Thurs/Sat  Former smoker  History of cocaine use  remote hx, currently sober  H/O aortic dissection  s/p repair (), surgery complicated by bowel ischemia s/p bowel resection and ostomy with reversal  H/O aortic valve insufficiency  Mitral regurgitation  Aorta disorder  H/O colectomy  Status post double vessel coronary artery bypass  S/P AVR (aortic valve replacement)  S/P MVR (mitral valve replacement)      REVIEW OF SYSTEMS:  CONSTITUTIONAL: No fever, weight loss, + fatigue  NECK: No pain or stiffness  CARDIOVASCULAR: AS PER HPI  RESPIRATORY: AS PER HPI  : No dysuria, no hematuria   GI: No dark color stool, no melena, no diarrhea, no constipation, no abdominal pain   NEURO: No headache, no dizziness, no slurred speech   MUSCULOSKELETAL: No joint pain or swelling. + muscle cramps  PSYCH: No agitation, no anxiety.      MEDICATIONS  (STANDING):  metoprolol tartrate 25 milliGRAM(s) Oral every 8 hours    MEDICATIONS  (PRN):  metoprolol tartrate Injectable 5 milliGRAM(s) IV Push every 6 hours PRN HR >120      Allergies  penicillin (Other)    SOCIAL HISTORY:  Prior history of cocaine abuse  Social alcohol   Tobacco 30x 3 ppd history. quit in     FAMILY HISTORY:  FH: hypertension  Family history of cardiac disorder (Mother)  mother      Vital Signs Last 24 Hrs  T(C): 36.7 (2021 11:28), Max: 36.8 (2021 05:26)  T(F): 98.1 (2021 11:28), Max: 98.3 (2021 05:26)  HR: 125 (2021 11:28) (123 - 130)  BP: 135/78 (2021 11:28) (135/78 - 173/106)  BP(mean): --  RR: 18 (2021 11:28) (16 - 29)  SpO2: 95% (2021 11:28) (93% - 100%)    Physical Exam:  Constitutional: Comfortable NAD  HEENT: NC/AT. No JVD  Respiratory: Bibasilar rales  Cardiovascular: S!S2 irregular   Gastrointestinal: Soft non-tender and non distended   Extremities: trace edema b/l    Psychiatric: Calm       LABS:                        9.7    7.57  )-----------( 154      ( 2021 06:43 )             30.2   06-24    146<H>  |  113<H>  |  41.1<H>  ----------------------------<  90  3.9   |  20.0<L>  |  2.00<H>    Ca    9.2      2021 06:43  Mg     1.9     06-24    TPro  7.4  /  Alb  4.2  /  TBili  1.6  /  DBili  x   /  AST  19  /  ALT  18  /  AlkPhos  103  06-24  LIVER FUNCTIONS - ( 2021 06:43 )  Alb: 4.2 g/dL / Pro: 7.4 g/dL / ALK PHOS: 103 U/L / ALT: 18 U/L / AST: 19 U/L / GGT: x           PT/INR - ( 2021 06:43 )   PT: 18.2 sec;   INR: 1.60 ratio         PTT - ( 2021 06:43 )  PTT:40.7 secCARDIAC MARKERS ( 2021 06:43 )  x     / <0.01 ng/mL / x     / x     / x              RADIOLOGY & ADDITIONAL STUDIES:    PREVIOUS DIAGNOSTIC TESTING  Echo 2021 (Office)  EF 45-50%, basal anteroseptal segment is akinetic, mid anteroseptal segment and basal inferoseptal segment are hypokinetic. Mild concentric LVH, moderate TR, Dilated aorta at sinuses of valsalva (4.0 cm) an ascending aorta (4.3 cm)    TTE 2020  Summary:   1. Left ventricular ejection fraction, by visual estimation, is 40%%.   2. Technically adequate study.   3. Moderately decreased global left ventricular systolic function.   4. Multiple left ventricular regional wall motion abnormalities exist. See wall motion findings.   5. The left ventricular diastolic function could not be assessed in this study.   6. Moderately reduced RV systolic function.   7. Trivial pericardial effusion.   8. A bioprosthetic valve is present in the mitral position.   9. Trace mitral valve regurgitation.  10. Moderate tricuspid regurgitation.  11. Bioprosthesis in the aortic position.  12. Estimated pulmonary artery systolic pressure is 40.9 mmHg assuming a right atrial pressure of 8 mmHg, which is consistent with mild pulmonary hypertension.  13. Endocardial visualization was enhanced with intravenous echo contrast.  14. Mitral valve mean gradient is 2.2 mmHg consistent with mild mitral stenosis.    MD Oriana Electronically signed on 2020 at 4:08:44 PM      CATHETERIZATION FINDINGS:   < from: Cardiac Cath Lab - Adult (11.10.20 @ 07:43) >    CORONARY VESSELS: The coronary circulation is right dominant.  LM:   --  LM: The left anterior descending and circumflex arteries arose  anomalously from separate ostia.  LAD:   --  LAD: Angiography showed moderate atherosclerosis.  CX:   --  Proximal circumflex: There was a discrete 60 % stenosis.  --  OM1: The vessel was small to medium sized. There was a discrete 80 %  stenosis. The lesion was irregularly contoured and eccentric.  --  OM2: The vessel was medium to large sized. There was a discrete 80 %  stenosis. The lesion was irregularly contoured, eccentric, and heavily  calcified.  RCA:   --  Proximal RCA: There was a 100 % stenosis. This lesion is a  chronic total occlusion. The distal vessel fills via collaterals from the  LCA.  AORTA: The root exhibited dilatation. Ascending aorta: The segment was  normal in size. Graft repair intact. Aortic arch: Normal. Known residual  Type B dissection by CTA. Descending aorta: The segment was normal in  size. Known residual Type B dissection by CTA.  ARCH VESSELS: Innominate: Known residual Type B dissection by CTA. Proximal  left subclavian: Known residual Type B dissection by CTA. LIMA: Normal.  TAMMY: Normal.  COMPLICATIONS: No complications occurred during the cath lab visit.  SUMMARY:  Summary: Addendum 20: Case reconfirmed to remove Bypass Graph Study,  duplicate Left Subclavian procedure,change contrast from 1665 to 165 and  add Inominate Angiography procedure  DIAGNOSTIC IMPRESSIONS: - Patent ascending aortic graft  - Severe 2 vessel CAD with chronically occluded proximal RCA. There are   brisk collaterals from the LCA.  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 Butler later this week  - [facility] will continue to follow  INTERVENTIONAL IMPRESSIONS: - Patent ascending aortic graft  - Severe 2 vessel CAD with chronically occluded proximal RCA. There are   brisk collaterals from the LCA.  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 Butler later this week  Prepared and signed by  Celso Stockton MD  Signed 2020 09:33:32         [place] CARDIAC ELECTROPHYSIOLOGY  [facility]   Office: [address] [place] [address]  Telephone: [phone] Fax:[phone]      HPI:  62 y/o M with a PMHx of Cocaine abuse (quit ), CAD s/p remote stent to LAD and recent CABG x 2 (DGV-OM, RPDA with Dr. Butler 2020), AI and MR s/p tAVR, tMVR (2020 Dr. Butler) with post-op Afib/flutter(on eliquis), HFrEF (EF 45-50% ), Aortic dissection s/p repair (), surgery complicated by CVA w/ residual left sided weakness and bowel ischemia s/p bowel resection and ostomy with reversal repair, Covid infection, ESRD on HD (/Thur/Sat) s/p LUE AVF, HTN, seizure disorder, and psoriasis who presented to the ER with complaints of shortness of breath and palpitations x 3 days. In ED, patient with noted elevated BNP (3K), CXR with moderate interstitial edema and in atrial flutter w/ RVR. EP called for arrythmia management.     Pt reports AF/AFL began postoperatively following with CABG x 2/AVR/MVR in  and has been on and off amiodarone since (follows with Dr Frankel). He does reports intermittent palpitations since his surgery but states that symptoms usually short lived, however, four days ago patient developed similar palpitations with associated fatigue and SOB but never resolved which prompted his to come to emergency department. Patient denies fevers, chills, syncope, chest pressure, abdominal pain, N/V/D, headache, hematuria, bloody/black stools or dizziness.     Cardiologist: Dr. Frankel  PCP: Dr Yo    Tele: NA (Pt seen in dialysis)   EK2021; Atrial flutter w/ RVR   Cardiac cath 11/10/20: severe 2 vessel CAD.  Chronically occluded proximal RCA with collaterals from the LCA.  EF 50%.  Elevated LVEDP.    PAST MEDICAL & SURGICAL HISTORY:  CHF (congestive heart failure)  CVA (cerebral vascular accident)  Seizures  last seizure 10 years ago  HTN (hypertension)  Hyperlipemia  COVID-2020  Atrial fibrillation  ESRD on dialysis  /Thurs/Sat  Former smoker  History of cocaine use  remote hx, currently sober  H/O aortic dissection  s/p repair (), surgery complicated by bowel ischemia s/p bowel resection and ostomy with reversal  H/O aortic valve insufficiency  Mitral regurgitation  Aorta disorder  H/O colectomy  Status post double vessel coronary artery bypass  S/P AVR (aortic valve replacement)  S/P MVR (mitral valve replacement)      REVIEW OF SYSTEMS:  CONSTITUTIONAL: No fever, weight loss, + fatigue  NECK: No pain or stiffness  CARDIOVASCULAR: AS PER HPI  RESPIRATORY: AS PER HPI  : No dysuria, no hematuria   GI: No dark color stool, no melena, no diarrhea, no constipation, no abdominal pain   NEURO: No headache, no dizziness, no slurred speech   MUSCULOSKELETAL: No joint pain or swelling. + muscle cramps  PSYCH: No agitation, no anxiety.      MEDICATIONS  (STANDING):  metoprolol tartrate 25 milliGRAM(s) Oral every 8 hours    MEDICATIONS  (PRN):  metoprolol tartrate Injectable 5 milliGRAM(s) IV Push every 6 hours PRN HR >120      Allergies  penicillin (Other)    SOCIAL HISTORY:  Prior history of cocaine abuse  Social alcohol   Tobacco 30x 3 ppd history. quit in     FAMILY HISTORY:  FH: hypertension  Family history of cardiac disorder (Mother)  mother      Vital Signs Last 24 Hrs  T(C): 36.7 (2021 11:28), Max: 36.8 (2021 05:26)  T(F): 98.1 (2021 11:28), Max: 98.3 (2021 05:26)  HR: 125 (2021 11:28) (123 - 130)  BP: 135/78 (2021 11:28) (135/78 - 173/106)  BP(mean): --  RR: 18 (2021 11:28) (16 - 29)  SpO2: 95% (2021 11:28) (93% - 100%)    Physical Exam:  Constitutional: Comfortable NAD  HEENT: NC/AT. No JVD  Respiratory: Bibasilar rales  Cardiovascular: S!S2 irregular   Gastrointestinal: Soft non-tender and non distended   Extremities: trace edema b/l    Psychiatric: Calm       LABS:                        9.7    7.57  )-----------( 154      ( 2021 06:43 )             30.2   06-24    146<H>  |  113<H>  |  41.1<H>  ----------------------------<  90  3.9   |  20.0<L>  |  2.00<H>    Ca    9.2      2021 06:43  Mg     1.9     06-24    TPro  7.4  /  Alb  4.2  /  TBili  1.6  /  DBili  x   /  AST  19  /  ALT  18  /  AlkPhos  103  06-24  LIVER FUNCTIONS - ( 2021 06:43 )  Alb: 4.2 g/dL / Pro: 7.4 g/dL / ALK PHOS: 103 U/L / ALT: 18 U/L / AST: 19 U/L / GGT: x           PT/INR - ( 2021 06:43 )   PT: 18.2 sec;   INR: 1.60 ratio         PTT - ( 2021 06:43 )  PTT:40.7 secCARDIAC MARKERS ( 2021 06:43 )  x     / <0.01 ng/mL / x     / x     / x              RADIOLOGY & ADDITIONAL STUDIES:    PREVIOUS DIAGNOSTIC TESTING  Echo 2021 (Office)  EF 45-50%, basal anteroseptal segment is akinetic, mid anteroseptal segment and basal inferoseptal segment are hypokinetic. Mild concentric LVH, moderate TR, Dilated aorta at sinuses of valsalva (4.0 cm) an ascending aorta (4.3 cm)    TTE 2020  Summary:   1. Left ventricular ejection fraction, by visual estimation, is 40%%.   2. Technically adequate study.   3. Moderately decreased global left ventricular systolic function.   4. Multiple left ventricular regional wall motion abnormalities exist. See wall motion findings.   5. The left ventricular diastolic function could not be assessed in this study.   6. Moderately reduced RV systolic function.   7. Trivial pericardial effusion.   8. A bioprosthetic valve is present in the mitral position.   9. Trace mitral valve regurgitation.  10. Moderate tricuspid regurgitation.  11. Bioprosthesis in the aortic position.  12. Estimated pulmonary artery systolic pressure is 40.9 mmHg assuming a right atrial pressure of 8 mmHg, which is consistent with mild pulmonary hypertension.  13. Endocardial visualization was enhanced with intravenous echo contrast.  14. Mitral valve mean gradient is 2.2 mmHg consistent with mild mitral stenosis.    MD Oriana Electronically signed on 2020 at 4:08:44 PM      CATHETERIZATION FINDINGS:   < from: Cardiac Cath Lab - Adult (11.10.20 @ 07:43) >    CORONARY VESSELS: The coronary circulation is right dominant.  LM:   --  LM: The left anterior descending and circumflex arteries arose  anomalously from separate ostia.  LAD:   --  LAD: Angiography showed moderate atherosclerosis.  CX:   --  Proximal circumflex: There was a discrete 60 % stenosis.  --  OM1: The vessel was small to medium sized. There was a discrete 80 %  stenosis. The lesion was irregularly contoured and eccentric.  --  OM2: The vessel was medium to large sized. There was a discrete 80 %  stenosis. The lesion was irregularly contoured, eccentric, and heavily  calcified.  RCA:   --  Proximal RCA: There was a 100 % stenosis. This lesion is a  chronic total occlusion. The distal vessel fills via collaterals from the  LCA.  AORTA: The root exhibited dilatation. Ascending aorta: The segment was  normal in size. Graft repair intact. Aortic arch: Normal. Known residual  Type B dissection by CTA. Descending aorta: The segment was normal in  size. Known residual Type B dissection by CTA.  ARCH VESSELS: Innominate: Known residual Type B dissection by CTA. Proximal  left subclavian: Known residual Type B dissection by CTA. LIMA: Normal.  TAMMY: Normal.  COMPLICATIONS: No complications occurred during the cath lab visit.  SUMMARY:  Summary: Addendum 20: Case reconfirmed to remove Bypass Graph Study,  duplicate Left Subclavian procedure,change contrast from 1665 to 89 and  add Inominate Angiography procedure  DIAGNOSTIC IMPRESSIONS: - Patent ascending aortic graft  - Severe 2 vessel CAD with chronically occluded proximal RCA. There are   brisk collaterals from the LCA.  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 Butler later this week  - [facility] will continue to follow  INTERVENTIONAL IMPRESSIONS: - Patent ascending aortic graft  - Severe 2 vessel CAD with chronically occluded proximal RCA. There are   brisk collaterals from the LCA.  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 Butler later this week  Prepared and signed by  Celso [place] MD  Signed 2020 09:33:32         [place] [facility]  [facility]   Office: [address] [place] [address]  Telephone: [phone] Fax:[phone]      HPI:  60 y/o M with a PMHx of Cocaine abuse (quit ), CAD s/p remote stent to LAD and recent CABG x 2 (DGV-OM, RPDA with Dr. Butler 2020), AI and MR s/p tAVR, tMVR (2020 Dr. Butler) with post-op Afib/flutter(on eliquis), HFrEF (EF 45-50% ), Aortic dissection s/p repair (), surgery complicated by CVA w/ residual left sided weakness and bowel ischemia s/p bowel resection and ostomy with reversal repair, Covid infection, ESRD on HD (/Thur/Sat) s/p LUE AVF, HTN, seizure disorder, and psoriasis who presented to the ER with complaints of shortness of breath and palpitations x 3 days. In ED, patient with noted elevated BNP (3K), CXR with moderate interstitial edema and in atrial flutter w/ RVR. EP called for arrythmia management.     Pt reports AF/AFL began postoperatively following with CABG x 2/AVR/MVR in  and has been on and off amiodarone since (follows with Dr Frankel). He does reports intermittent palpitations since his surgery but states that symptoms usually short lived, however, four days ago patient developed similar palpitations with associated fatigue and SOB but never resolved which prompted his to come to emergency department. Patient denies fevers, chills, syncope, chest pressure, abdominal pain, N/V/D, headache, hematuria, bloody/black stools or dizziness.     Cardiologist: Dr. Frankel  PCP: Dr Yo    Tele: NA (Pt seen in dialysis)   EK2021; Atrial flutter w/ RVR   Cardiac cath 11/10/20: severe 2 vessel CAD.  Chronically occluded proximal RCA with collaterals from the LCA.  EF 50%.  Elevated LVEDP.    PAST MEDICAL & SURGICAL HISTORY:  CHF (congestive heart failure)  CVA (cerebral vascular accident)  Seizures  last seizure 10 years ago  HTN (hypertension)  Hyperlipemia  COVID-2020  Atrial fibrillation  ESRD on dialysis  /Thurs/Sat  Former smoker  History of cocaine use  remote hx, currently sober  H/O aortic dissection  s/p repair (), surgery complicated by bowel ischemia s/p bowel resection and ostomy with reversal  H/O aortic valve insufficiency  Mitral regurgitation  Aorta disorder  H/O colectomy  Status post double vessel coronary artery bypass  S/P AVR (aortic valve replacement)  S/P MVR (mitral valve replacement)      REVIEW OF SYSTEMS:  CONSTITUTIONAL: No fever, weight loss, + fatigue  NECK: No pain or stiffness  CARDIOVASCULAR: AS PER HPI  RESPIRATORY: AS PER HPI  : No dysuria, no hematuria   GI: No dark color stool, no melena, no diarrhea, no constipation, no abdominal pain   NEURO: No headache, no dizziness, no slurred speech   MUSCULOSKELETAL: No joint pain or swelling. + muscle cramps  PSYCH: No agitation, no anxiety.      MEDICATIONS  (STANDING):  metoprolol tartrate 25 milliGRAM(s) Oral every 8 hours    MEDICATIONS  (PRN):  metoprolol tartrate Injectable 5 milliGRAM(s) IV Push every 6 hours PRN HR >120      Allergies  penicillin (Other)    SOCIAL HISTORY:  Prior history of cocaine abuse  Social alcohol   Tobacco 30x 3 ppd history. quit in     FAMILY HISTORY:  FH: hypertension  Family history of cardiac disorder (Mother)  mother      Vital Signs Last 24 Hrs  T(C): 36.7 (2021 11:28), Max: 36.8 (2021 05:26)  T(F): 98.1 (2021 11:28), Max: 98.3 (2021 05:26)  HR: 125 (2021 11:28) (123 - 130)  BP: 135/78 (2021 11:28) (135/78 - 173/106)  BP(mean): --  RR: 18 (2021 11:28) (16 - 29)  SpO2: 95% (2021 11:28) (93% - 100%)    Physical Exam:  Constitutional: Comfortable NAD  HEENT: NC/AT. No JVD  Respiratory: Bibasilar rales  Cardiovascular: S!S2 irregular   Gastrointestinal: Soft non-tender and non distended   Extremities: trace edema b/l.  LUE AVF, thrill/ bruit+  Psychiatric: Calm       LABS:                        9.7    7.57  )-----------( 154      ( 2021 06:43 )             30.2   06-24    146<H>  |  113<H>  |  41.1<H>  ----------------------------<  90  3.9   |  20.0<L>  |  2.00<H>    Ca    9.2      2021 06:43  Mg     1.9     06-24    TPro  7.4  /  Alb  4.2  /  TBili  1.6  /  DBili  x   /  AST  19  /  ALT  18  /  AlkPhos  103  06-24  LIVER FUNCTIONS - ( 2021 06:43 )  Alb: 4.2 g/dL / Pro: 7.4 g/dL / ALK PHOS: 103 U/L / ALT: 18 U/L / AST: 19 U/L / GGT: x           PT/INR - ( 2021 06:43 )   PT: 18.2 sec;   INR: 1.60 ratio         PTT - ( 2021 06:43 )  PTT:40.7 secCARDIAC MARKERS ( 2021 06:43 )  x     / <0.01 ng/mL / x     / x     / x              RADIOLOGY & ADDITIONAL STUDIES:    PREVIOUS DIAGNOSTIC TESTING  Echo 2021 (Office)  EF 45-50%, basal anteroseptal segment is akinetic, mid anteroseptal segment and basal inferoseptal segment are hypokinetic. Mild concentric LVH, moderate TR, Dilated aorta at sinuses of valsalva (4.0 cm) an ascending aorta (4.3 cm)    TTE 2020  Summary:   1. Left ventricular ejection fraction, by visual estimation, is 40%%.   2. Technically adequate study.   3. Moderately decreased global left ventricular systolic function.   4. Multiple left ventricular regional wall motion abnormalities exist. See wall motion findings.   5. The left ventricular diastolic function could not be assessed in this study.   6. Moderately reduced RV systolic function.   7. Trivial pericardial effusion.   8. A bioprosthetic valve is present in the mitral position.   9. Trace mitral valve regurgitation.  10. Moderate tricuspid regurgitation.  11. Bioprosthesis in the aortic position.  12. Estimated pulmonary artery systolic pressure is 40.9 mmHg assuming a right atrial pressure of 8 mmHg, which is consistent with mild pulmonary hypertension.  13. Endocardial visualization was enhanced with intravenous echo contrast.  14. Mitral valve mean gradient is 2.2 mmHg consistent with mild mitral stenosis.    MD Oriana Electronically signed on 2020 at 4:08:44 PM      CATHETERIZATION FINDINGS:   < from: Cardiac Cath Lab - Adult (11.10.20 @ 07:43) >    CORONARY VESSELS: The coronary circulation is right dominant.  LM:   --  LM: The left anterior descending and circumflex arteries arose  anomalously from separate ostia.  LAD:   --  LAD: Angiography showed moderate atherosclerosis.  CX:   --  Proximal circumflex: There was a discrete 60 % stenosis.  --  OM1: The vessel was small to medium sized. There was a discrete 80 %  stenosis. The lesion was irregularly contoured and eccentric.  --  OM2: The vessel was medium to large sized. There was a discrete 80 %  stenosis. The lesion was irregularly contoured, eccentric, and heavily  calcified.  RCA:   --  Proximal RCA: There was a 100 % stenosis. This lesion is a  chronic total occlusion. The distal vessel fills via collaterals from the  LCA.  AORTA: The root exhibited dilatation. Ascending aorta: The segment was  normal in size. Graft repair intact. Aortic arch: Normal. Known residual  Type B dissection by CTA. Descending aorta: The segment was normal in  size. Known residual Type B dissection by CTA.  ARCH VESSELS: Innominate: Known residual Type B dissection by CTA. Proximal  left subclavian: Known residual Type B dissection by CTA. LIMA: Normal.  TAMMY: Normal.  COMPLICATIONS: No complications occurred during the cath lab visit.  SUMMARY:  Summary: Addendum 20: Case reconfirmed to remove Bypass Graph Study,  duplicate Left Subclavian procedure,change contrast from 1665 to 165 and  add Inominate Angiography procedure  DIAGNOSTIC IMPRESSIONS: - Patent ascending aortic graft  - Severe 2 vessel CAD with chronically occluded proximal RCA. There are   brisk collaterals from the LCA.  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 Butler later this week  - [facility] will continue to follow  INTERVENTIONAL IMPRESSIONS: - Patent ascending aortic graft  - Severe 2 vessel CAD with chronically occluded proximal RCA. There are   brisk collaterals from the LCA.  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 Butler later this week  Prepared and signed by  Celso [place] MD  Signed 2020 09:33:32         EXAM:  XR CHEST PORTABLE URGENT 1V                          PROCEDURE DATE:  2021    INTERPRETATION:  Clinical history: 61-year-old male, chest pain.  Two expiratory/kyphotic views are compared to 2020 and are correlated with the abdominal CT of 3/12/2021.  Mild cardiomegaly and normal pulmonary vasculature with no gross consolidation, effusion, pneumothorax or acute osseous finding.  Mild-to-moderate perihilar interstitial prominence is most consistent with edema/infiltrate.  Patient is post sternotomy/CABG/cardiac valve surgery.  IMPRESSION:  Moderate interstitial edema/infiltrate with no gross consolidation, new     [place] [facility]ROPHYSI[facility]  [facility]   Office: [address] [place] [address]  Telephone: [phone] Fax:[phone]      HPI:  60 y/o M with a PMHx of Cocaine abuse (quit ), CAD s/p remote stent to LAD and recent CABG x 2 (DGV-OM, RPDA with Dr. Butler 2020), AI and MR s/p tAVR, tMVR (2020 Dr. Butler) with post-op Afib/flutter(on eliquis), HFrEF (EF 45-50% ), Aortic dissection s/p repair (), surgery complicated by CVA w/ residual left sided weakness and bowel ischemia s/p bowel resection and ostomy with reversal repair, Covid infection, ESRD on HD (/Thur/Sat) s/p LUE AVF, HTN, seizure disorder, GIB s/p colonoscopy and AVMs cauterization 3/2021, and psoriasis who presented to the ER with complaints of shortness of breath and palpitations x 3 days. In ED, patient with noted elevated BNP (3K), CXR with moderate interstitial edema and in atrial flutter w/ RVR. EP called for arrythmia management.     Pt reports AF/AFL began postoperatively following with CABG x 2/AVR/MVR in  and has been on and off amiodarone since (follows with Dr Frankel). He does reports intermittent palpitations since his surgery but states that symptoms usually short lived, however, four days ago patient developed similar palpitations with associated fatigue and SOB but never resolved which prompted his to come to emergency department. Patient denies fevers, chills, syncope, chest pressure, abdominal pain, N/V/D, headache, hematuria, bloody/black stools or dizziness.     Cardiologist: Dr. Frankel  PCP: Dr Yo    Tele: NA (Pt seen in dialysis)   EK2021; Atrial flutter w/ RVR   Cardiac cath 11/10/20: severe 2 vessel CAD.  Chronically occluded proximal RCA with collaterals from the LCA.  EF 50%.  Elevated LVEDP.    PAST MEDICAL & SURGICAL HISTORY:  CHF (congestive heart failure)  CVA (cerebral vascular accident)  Seizures  last seizure 10 years ago  HTN (hypertension)  Hyperlipemia  COVID-19  2020  Atrial fibrillation  ESRD on dialysis  /Thurs/Sat  Former smoker  History of cocaine use  remote hx, currently sober  H/O aortic dissection  s/p repair (), surgery complicated by bowel ischemia s/p bowel resection and ostomy with reversal  H/O aortic valve insufficiency  Mitral regurgitation  Aorta disorder  H/O colectomy  Status post double vessel coronary artery bypass  S/P AVR (aortic valve replacement)  S/P MVR (mitral valve replacement)      REVIEW OF SYSTEMS:  CONSTITUTIONAL: No fever, weight loss, + fatigue  NECK: No pain or stiffness  CARDIOVASCULAR: AS PER HPI  RESPIRATORY: AS PER HPI  : No dysuria, no hematuria   GI: No dark color stool, no melena, no diarrhea, no constipation, no abdominal pain   NEURO: No headache, no dizziness, no slurred speech   MUSCULOSKELETAL: No joint pain or swelling. + muscle cramps  PSYCH: No agitation, no anxiety.      MEDICATIONS  (STANDING):  metoprolol tartrate 25 milliGRAM(s) Oral every 8 hours    MEDICATIONS  (PRN):  metoprolol tartrate Injectable 5 milliGRAM(s) IV Push every 6 hours PRN HR >120      Allergies  penicillin (Other)    SOCIAL HISTORY:  Prior history of cocaine abuse  Social alcohol   Tobacco 30x 3 ppd history. quit in     FAMILY HISTORY:  FH: hypertension  Family history of cardiac disorder (Mother)  mother      Vital Signs Last 24 Hrs  T(C): 36.7 (2021 11:28), Max: 36.8 (2021 05:26)  T(F): 98.1 (2021 11:28), Max: 98.3 (2021 05:26)  HR: 125 (2021 11:28) (123 - 130)  BP: 135/78 (2021 11:28) (135/78 - 173/106)  BP(mean): --  RR: 18 (2021 11:28) (16 - 29)  SpO2: 95% (2021 11:28) (93% - 100%)    Physical Exam:  Constitutional: Comfortable NAD  HEENT: NC/AT. No JVD  Respiratory: Bibasilar rales  Cardiovascular: S!S2 irregular   Gastrointestinal: Soft non-tender and non distended   Extremities: trace edema b/l.  LUE AVF, thrill/ bruit+  Psychiatric: Calm       LABS:                        9.7    7.57  )-----------( 154      ( 2021 06:43 )             30.2   06-    146<H>  |  113<H>  |  41.1<H>  ----------------------------<  90  3.9   |  20.0<L>  |  2.00<H>    Ca    9.2      2021 06:43  Mg     1.9     -24    TPro  7.4  /  Alb  4.2  /  TBili  1.6  /  DBili  x   /  AST  19  /  ALT  18  /  AlkPhos  103  24  LIVER FUNCTIONS - ( 2021 06:43 )  Alb: 4.2 g/dL / Pro: 7.4 g/dL / ALK PHOS: 103 U/L / ALT: 18 U/L / AST: 19 U/L / GGT: x           PT/INR - ( 2021 06:43 )   PT: 18.2 sec;   INR: 1.60 ratio         PTT - ( 2021 06:43 )  PTT:40.7 secCARDIAC MARKERS ( 2021 06:43 )  x     / <0.01 ng/mL / x     / x     / x              RADIOLOGY & ADDITIONAL STUDIES:    PREVIOUS DIAGNOSTIC TESTING  Echo 2021 (Office)  EF 45-50%, basal anteroseptal segment is akinetic, mid anteroseptal segment and basal inferoseptal segment are hypokinetic. Mild concentric LVH, moderate TR, Dilated aorta at sinuses of valsalva (4.0 cm) an ascending aorta (4.3 cm)    TTE 2020  Summary:   1. Left ventricular ejection fraction, by visual estimation, is 40%%.   2. Technically adequate study.   3. Moderately decreased global left ventricular systolic function.   4. Multiple left ventricular regional wall motion abnormalities exist. See wall motion findings.   5. The left ventricular diastolic function could not be assessed in this study.   6. Moderately reduced RV systolic function.   7. Trivial pericardial effusion.   8. A bioprosthetic valve is present in the mitral position.   9. Trace mitral valve regurgitation.  10. Moderate tricuspid regurgitation.  11. Bioprosthesis in the aortic position.  12. Estimated pulmonary artery systolic pressure is 40.9 mmHg assuming a right atrial pressure of 8 mmHg, which is consistent with mild pulmonary hypertension.  13. Endocardial visualization was enhanced with intravenous echo contrast.  14. Mitral valve mean gradient is 2.2 mmHg consistent with mild mitral stenosis.    MD Oriana Electronically signed on 2020 at 4:08:44 PM      CATHETERIZATION FINDINGS:   < from: Cardiac Cath Lab - Adult (11.10.20 @ 07:43) >    CORONARY VESSELS: The coronary circulation is right dominant.  LM:   --  LM: The left anterior descending and circumflex arteries arose  anomalously from separate ostia.  LAD:   --  LAD: Angiography showed moderate atherosclerosis.  CX:   --  Proximal circumflex: There was a discrete 60 % stenosis.  --  OM1: The vessel was small to medium sized. There was a discrete 80 %  stenosis. The lesion was irregularly contoured and eccentric.  --  OM2: The vessel was medium to large sized. There was a discrete 80 %  stenosis. The lesion was irregularly contoured, eccentric, and heavily  calcified.  RCA:   --  Proximal RCA: There was a 100 % stenosis. This lesion is a  chronic total occlusion. The distal vessel fills via collaterals from the  LCA.  AORTA: The root exhibited dilatation. Ascending aorta: The segment was  normal in size. Graft repair intact. Aortic arch: Normal. Known residual  Type B dissection by CTA. Descending aorta: The segment was normal in  size. Known residual Type B dissection by CTA.  ARCH VESSELS: Innominate: Known residual Type B dissection by CTA. Proximal  left subclavian: Known residual Type B dissection by CTA. LIMA: Normal.  TAMMY: Normal.  COMPLICATIONS: No complications occurred during the cath lab visit.  SUMMARY:  Summary: Addendum 20: Case reconfirmed to remove Bypass Graph Study,  duplicate Left Subclavian procedure,change contrast from 1665 to 165 and  add Inominate Angiography procedure  DIAGNOSTIC IMPRESSIONS: - Patent ascending aortic graft  - Severe 2 vessel CAD with chronically occluded proximal RCA. There are   brisk collaterals from the LCA.  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 Butler later this week  - [facility] will continue to follow  INTERVENTIONAL IMPRESSIONS: - Patent ascending aortic graft  - Severe 2 vessel CAD with chronically occluded proximal RCA. There are   brisk collaterals from the LCA.  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 Luke brown this week  Prepared and signed by  Celso [place] MD  Signed 2020 09:33:32         EXAM:  XR CHEST PORTABLE URGENT 1V                          PROCEDURE DATE:  2021    INTERPRETATION:  Clinical history: 61-year-old male, chest pain.  Two expiratory/kyphotic views are compared to 2020 and are correlated with the abdominal CT of 3/12/2021.  Mild cardiomegaly and normal pulmonary vasculature with no gross consolidation, effusion, pneumothorax or acute osseous finding.  Mild-to-moderate perihilar interstitial prominence is most consistent with edema/infiltrate.  Patient is post sternotomy/CABG/cardiac valve surgery.  IMPRESSION:  Moderate interstitial edema/infiltrate with no gross consolidation, new     [place] [facility]LECTROPHYSIOLOG[facility]  [facility]   Office: [address] [place] [address]  Telephone: [phone] Fax:[phone]      HPI:  62 y/o M with a PMHx of Cocaine abuse (quit ), CAD s/p remote stent to LAD and recent CABG x 2 (DGV-OM, RPDA with Dr. Butler 2020), AI and MR s/p tAVR, tMVR (2020 Dr. Butler) with post-op Afib/flutter(on eliquis), HFrEF (EF 45-50% ), Aortic dissection s/p repair (), surgery complicated by CVA w/ residual left sided weakness and bowel ischemia s/p bowel resection and ostomy with reversal repair, Covid infection, ESRD on HD (/Thur/Sat) s/p LUE AVF, HTN, seizure disorder, GIB s/p colonoscopy and AVMs cauterization 3/2021, and psoriasis who presented to the ER with complaints of shortness of breath and palpitations x 3 days. In ED, patient with noted elevated BNP (3K), CXR with moderate interstitial edema and in atrial flutter w/ RVR. EP called for arrythmia management.     Pt reports AF/AFL began postoperatively following with CABG x 2/AVR/MVR in  and has been on and off amiodarone since (follows with Dr Frankel). He does reports intermittent palpitations since his surgery but states that symptoms usually short lived, however, four days ago patient developed similar palpitations with associated fatigue and SOB but never resolved which prompted his to come to emergency department. Patient denies fevers, chills, syncope, chest pressure, abdominal pain, N/V/D, headache, hematuria, bloody/black stools or dizziness.     Cardiologist: Dr. Frankel  PCP: Dr Yo    Tele: NA (Pt seen in dialysis)   EK2021; Atrial flutter w/ RVR   Cardiac cath 11/10/20: severe 2 vessel CAD.  Chronically occluded proximal RCA with collaterals from the LCA.  EF 50%.  Elevated LVEDP.    PAST MEDICAL & SURGICAL HISTORY:  CHF (congestive heart failure)  CVA (cerebral vascular accident)  Seizures  last seizure 10 years ago  HTN (hypertension)  Hyperlipemia  COVID-19  2020  Atrial fibrillation  ESRD on dialysis  /Thurs/Sat  Former smoker  History of cocaine use  remote hx, currently sober  H/O aortic dissection  s/p repair (), surgery complicated by bowel ischemia s/p bowel resection and ostomy with reversal  H/O aortic valve insufficiency  Mitral regurgitation  Aorta disorder  H/O colectomy  Status post double vessel coronary artery bypass  S/P AVR (aortic valve replacement)  S/P MVR (mitral valve replacement)      REVIEW OF SYSTEMS:  CONSTITUTIONAL: No fever, weight loss, + fatigue  NECK: No pain or stiffness  CARDIOVASCULAR: AS PER HPI  RESPIRATORY: AS PER HPI  : No dysuria, no hematuria   GI: No dark color stool, no melena, no diarrhea, no constipation, no abdominal pain   NEURO: No headache, no dizziness, no slurred speech   MUSCULOSKELETAL: No joint pain or swelling. + muscle cramps  PSYCH: No agitation, no anxiety.      MEDICATIONS  (STANDING):  metoprolol tartrate 25 milliGRAM(s) Oral every 8 hours    MEDICATIONS  (PRN):  metoprolol tartrate Injectable 5 milliGRAM(s) IV Push every 6 hours PRN HR >120      Allergies  penicillin (Other)    SOCIAL HISTORY:  Prior history of cocaine abuse  Social alcohol   Tobacco 30x 3 ppd history. quit in     FAMILY HISTORY:  FH: hypertension  Family history of cardiac disorder (Mother)  mother      Vital Signs Last 24 Hrs  T(C): 36.7 (2021 11:28), Max: 36.8 (2021 05:26)  T(F): 98.1 (2021 11:28), Max: 98.3 (2021 05:26)  HR: 125 (2021 11:28) (123 - 130)  BP: 135/78 (2021 11:28) (135/78 - 173/106)  BP(mean): --  RR: 18 (2021 11:28) (16 - 29)  SpO2: 95% (2021 11:28) (93% - 100%)    Physical Exam:  Constitutional: Comfortable NAD  HEENT: NC/AT. No JVD  Respiratory: Bibasilar rales  Cardiovascular: S1S2 irregular   Gastrointestinal: Soft non-tender and non distended   Extremities: trace edema b/l.  LUE AVF, thrill/ bruit+  Psychiatric: Calm       LABS:                        9.7    7.57  )-----------( 154      ( 2021 06:43 )             30.2   06-24    146<H>  |  113<H>  |  41.1<H>  ----------------------------<  90  3.9   |  20.0<L>  |  2.00<H>    Ca    9.2      2021 06:43  Mg     1.9     -24    TPro  7.4  /  Alb  4.2  /  TBili  1.6  /  DBili  x   /  AST  19  /  ALT  18  /  AlkPhos  103  -24  LIVER FUNCTIONS - ( 2021 06:43 )  Alb: 4.2 g/dL / Pro: 7.4 g/dL / ALK PHOS: 103 U/L / ALT: 18 U/L / AST: 19 U/L / GGT: x           PT/INR - ( 2021 06:43 )   PT: 18.2 sec;   INR: 1.60 ratio         PTT - ( 2021 06:43 )  PTT:40.7 secCARDIAC MARKERS ( 2021 06:43 )  x     / <0.01 ng/mL / x     / x     / x              RADIOLOGY & ADDITIONAL STUDIES:    PREVIOUS DIAGNOSTIC TESTING  Echo 2021 (Office)  EF 45-50%, basal anteroseptal segment is akinetic, mid anteroseptal segment and basal inferoseptal segment are hypokinetic. Mild concentric LVH, moderate TR, Dilated aorta at sinuses of valsalva (4.0 cm) an ascending aorta (4.3 cm)    TTE 2020  Summary:   1. Left ventricular ejection fraction, by visual estimation, is 40%%.   2. Technically adequate study.   3. Moderately decreased global left ventricular systolic function.   4. Multiple left ventricular regional wall motion abnormalities exist. See wall motion findings.   5. The left ventricular diastolic function could not be assessed in this study.   6. Moderately reduced RV systolic function.   7. Trivial pericardial effusion.   8. A bioprosthetic valve is present in the mitral position.   9. Trace mitral valve regurgitation.  10. Moderate tricuspid regurgitation.  11. Bioprosthesis in the aortic position.  12. Estimated pulmonary artery systolic pressure is 40.9 mmHg assuming a right atrial pressure of 8 mmHg, which is consistent with mild pulmonary hypertension.  13. Endocardial visualization was enhanced with intravenous echo contrast.  14. Mitral valve mean gradient is 2.2 mmHg consistent with mild mitral stenosis.    MD Oriana Electronically signed on 2020 at 4:08:44 PM      CATHETERIZATION FINDINGS:   < from: Cardiac Cath Lab - Adult (11.10.20 @ 07:43) >    CORONARY VESSELS: The coronary circulation is right dominant.  LM:   --  LM: The left anterior descending and circumflex arteries arose  anomalously from separate ostia.  LAD:   --  LAD: Angiography showed moderate atherosclerosis.  CX:   --  Proximal circumflex: There was a discrete 60 % stenosis.  --  OM1: The vessel was small to medium sized. There was a discrete 80 %  stenosis. The lesion was irregularly contoured and eccentric.  --  OM2: The vessel was medium to large sized. There was a discrete 80 %  stenosis. The lesion was irregularly contoured, eccentric, and heavily  calcified.  RCA:   --  Proximal RCA: There was a 100 % stenosis. This lesion is a  chronic total occlusion. The distal vessel fills via collaterals from the  LCA.  AORTA: The root exhibited dilatation. Ascending aorta: The segment was  normal in size. Graft repair intact. Aortic arch: Normal. Known residual  Type B dissection by CTA. Descending aorta: The segment was normal in  size. Known residual Type B dissection by CTA.  ARCH VESSELS: Innominate: Known residual Type B dissection by CTA. Proximal  left subclavian: Known residual Type B dissection by CTA. LIMA: Normal.  TAMMY: Normal.  COMPLICATIONS: No complications occurred during the cath lab visit.  SUMMARY:  Summary: Addendum 20: Case reconfirmed to remove Bypass Graph Study,  duplicate Left Subclavian procedure,change contrast from 1665 to 165 and  add Inominate Angiography procedure  DIAGNOSTIC IMPRESSIONS: - Patent ascending aortic graft  - Severe 2 vessel CAD with chronically occluded proximal RCA. There are   brisk collaterals from the LCA.  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 Butler later this week  - [facility] will continue to follow  INTERVENTIONAL IMPRESSIONS: - Patent ascending aortic graft  - Severe 2 vessel CAD with chronically occluded proximal RCA. There are   brisk collaterals from the LCA.  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 Luke brown this week  Prepared and signed by  Celso Stockton MD  Signed 2020 09:33:32         EXAM:  XR CHEST PORTABLE URGENT 1V                          PROCEDURE DATE:  2021    INTERPRETATION:  Clinical history: 61-year-old male, chest pain.  Two expiratory/kyphotic views are compared to 2020 and are correlated with the abdominal CT of 3/12/2021.  Mild cardiomegaly and normal pulmonary vasculature with no gross consolidation, effusion, pneumothorax or acute osseous finding.  Mild-to-moderate perihilar interstitial prominence is most consistent with edema/infiltrate.  Patient is post sternotomy/CABG/cardiac valve surgery.  IMPRESSION:  Moderate interstitial edema/infiltrate with no gross consolidation, new     [place] CARDIAC ELECTROPHYSIOLOGY  [facility]   Office: [address] [address]  Telephone: [phone] Fax:[phone]      HPI:  60 y/o M with a PMHx of Cocaine abuse (quit ), CAD s/p remote stent to LAD and recent CABG x 2 (DGV-OM, RPDA with Dr. Butler 2020), AI and MR s/p tAVR, tMVR (2020 Dr. Butler) with post-op Afib/flutter(on eliquis), HFrEF (EF 45-50% ), Aortic dissection s/p repair (), surgery complicated by CVA w/ residual left sided weakness and bowel ischemia s/p bowel resection and ostomy with reversal repair, Covid infection, ESRD on HD (/Thur/Sat) s/p LUE AVF, HTN, seizure disorder, GIB s/p colonoscopy and AVMs cauterization 3/2021, and psoriasis who presented to the ER with complaints of shortness of breath and palpitations x 3 days. In ED, patient with noted elevated BNP (3K), CXR with moderate interstitial edema and in atrial flutter w/ RVR. EP called for arrythmia management.     Pt reports AF/AFL began postoperatively following with CABG x 2/AVR/MVR in  and has been on and off amiodarone since (follows with Dr Frankel). He does reports intermittent palpitations since his surgery but states that symptoms usually short lived, however, four days ago patient developed similar palpitations with associated fatigue and SOB but never resolved which prompted his to come to emergency department. Patient denies fevers, chills, syncope, chest pressure, abdominal pain, N/V/D, headache, hematuria, bloody/black stools or dizziness.     Cardiologist: Dr. Frankel  PCP: Dr Yo    Tele: AFL w/ average VR ~120bpm   EK2021; Atrial flutter w/ RVR   Cardiac cath 11/10/20: severe 2 vessel CAD.  Chronically occluded proximal RCA with collaterals from the LCA.  EF 50%.  Elevated LVEDP.    EKGs:  2021; Atrial Fibrillation   3/12/2021; AFL  3/13/2021; AFL  2021 Atrial Fibrillation   2020 AFL  20; AFL  20; AFL  20; AFL  20; AFL  20; AFL  20; AFL  20; AFL  20; AFL      PAST MEDICAL & SURGICAL HISTORY:  CHF (congestive heart failure)  CVA (cerebral vascular accident)  Seizures  last seizure 10 years ago  HTN (hypertension)  Hyperlipemia  COVID-2020  Atrial fibrillation  ESRD on dialysis  /Thurs/Sat  Former smoker  History of cocaine use  remote hx, currently sober  H/O aortic dissection  s/p repair (), surgery complicated by bowel ischemia s/p bowel resection and ostomy with reversal  H/O aortic valve insufficiency  Mitral regurgitation  Aorta disorder  H/O colectomy  Status post double vessel coronary artery bypass  S/P AVR (aortic valve replacement)  S/P MVR (mitral valve replacement)      REVIEW OF SYSTEMS:  CONSTITUTIONAL: No fever, weight loss, + fatigue  NECK: No pain or stiffness  CARDIOVASCULAR: AS PER HPI  RESPIRATORY: AS PER HPI  : No dysuria, no hematuria   GI: No dark color stool, no melena, no diarrhea, no constipation, no abdominal pain   NEURO: No headache, no dizziness, no slurred speech   MUSCULOSKELETAL: No joint pain or swelling. + muscle cramps  PSYCH: No agitation, no anxiety.      MEDICATIONS  (STANDING):  metoprolol tartrate 25 milliGRAM(s) Oral every 8 hours    MEDICATIONS  (PRN):  metoprolol tartrate Injectable 5 milliGRAM(s) IV Push every 6 hours PRN HR >120      Allergies  penicillin (Other)    SOCIAL HISTORY:  Prior history of cocaine abuse  Social alcohol   Tobacco 30x 3 ppd history. quit in     FAMILY HISTORY:  FH: hypertension  Family history of cardiac disorder (Mother)  mother      Vital Signs Last 24 Hrs  T(C): 36.7 (2021 11:28), Max: 36.8 (2021 05:26)  T(F): 98.1 (2021 11:28), Max: 98.3 (2021 05:26)  HR: 125 (2021 11:28) (123 - 130)  BP: 135/78 (2021 11:28) (135/78 - 173/106)  BP(mean): --  RR: 18 (2021 11:28) (16 - 29)  SpO2: 95% (2021 11:28) (93% - 100%)    Physical Exam:  Constitutional: Comfortable NAD  HEENT: NC/AT. No JVD  Respiratory: Bibasilar rales  Cardiovascular: S1S2 irregular   Gastrointestinal: Soft non-tender and non distended   Extremities: trace edema b/l.  LUE AVF, thrill/ bruit+  Psychiatric: Calm       LABS:                        9.7    7.57  )-----------( 154      ( 2021 06:43 )             30.2   06-24    146<H>  |  113<H>  |  41.1<H>  ----------------------------<  90  3.9   |  20.0<L>  |  2.00<H>    Ca    9.2      2021 06:43  Mg     1.9     06-24    TPro  7.4  /  Alb  4.2  /  TBili  1.6  /  DBili  x   /  AST  19  /  ALT  18  /  AlkPhos  103  06-24  LIVER FUNCTIONS - ( 2021 06:43 )  Alb: 4.2 g/dL / Pro: 7.4 g/dL / ALK PHOS: 103 U/L / ALT: 18 U/L / AST: 19 U/L / GGT: x           PT/INR - ( 2021 06:43 )   PT: 18.2 sec;   INR: 1.60 ratio         PTT - ( 2021 06:43 )  PTT:40.7 secCARDIAC MARKERS ( 2021 06:43 )  x     / <0.01 ng/mL / x     / x     / x              RADIOLOGY & ADDITIONAL STUDIES:    PREVIOUS DIAGNOSTIC TESTING  Echo 2021 (Office)  EF 45-50%, basal anteroseptal segment is akinetic, mid anteroseptal segment and basal inferoseptal segment are hypokinetic. Mild concentric LVH, moderate TR, Dilated aorta at sinuses of valsalva (4.0 cm) an ascending aorta (4.3 cm)    TTE 2020  Summary:   1. Left ventricular ejection fraction, by visual estimation, is 40%%.   2. Technically adequate study.   3. Moderately decreased global left ventricular systolic function.   4. Multiple left ventricular regional wall motion abnormalities exist. See wall motion findings.   5. The left ventricular diastolic function could not be assessed in this study.   6. Moderately reduced RV systolic function.   7. Trivial pericardial effusion.   8. A bioprosthetic valve is present in the mitral position.   9. Trace mitral valve regurgitation.  10. Moderate tricuspid regurgitation.  11. Bioprosthesis in the aortic position.  12. Estimated pulmonary artery systolic pressure is 40.9 mmHg assuming a right atrial pressure of 8 mmHg, which is consistent with mild pulmonary hypertension.  13. Endocardial visualization was enhanced with intravenous echo contrast.  14. Mitral valve mean gradient is 2.2 mmHg consistent with mild mitral stenosis.    MD Oriana Electronically signed on 2020 at 4:08:44 PM      CATHETERIZATION FINDINGS:   < from: Cardiac Cath Lab - Adult (11.10.20 @ 07:43) >    CORONARY VESSELS: The coronary circulation is right dominant.  LM:   --  LM: The left anterior descending and circumflex arteries arose  anomalously from separate ostia.  LAD:   --  LAD: Angiography showed moderate atherosclerosis.  CX:   --  Proximal circumflex: There was a discrete 60 % stenosis.  --  OM1: The vessel was small to medium sized. There was a discrete 80 %  stenosis. The lesion was irregularly contoured and eccentric.  --  OM2: The vessel was medium to large sized. There was a discrete 80 %  stenosis. The lesion was irregularly contoured, eccentric, and heavily  calcified.  RCA:   --  Proximal RCA: There was a 100 % stenosis. This lesion is a  chronic total occlusion. The distal vessel fills via collaterals from the  LCA.  AORTA: The root exhibited dilatation. Ascending aorta: The segment was  normal in size. Graft repair intact. Aortic arch: Normal. Known residual  Type B dissection by CTA. Descending aorta: The segment was normal in  size. Known residual Type B dissection by CTA.  ARCH VESSELS: Innominate: Known residual Type B dissection by CTA. Proximal  left subclavian: Known residual Type B dissection by CTA. LIMA: Normal.  TAMMY: Normal.  COMPLICATIONS: No complications occurred during the cath lab visit.  SUMMARY:  Summary: Addendum 20: Case reconfirmed to remove Bypass Graph Study,  duplicate Left Subclavian procedure,change contrast from 1665 to 165 and  add Inominate Angiography procedure  DIAGNOSTIC IMPRESSIONS: - Patent ascending aortic graft  - Severe 2 vessel CAD with chronically occluded proximal RCA. There are   brisk collaterals from the LCA.  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 Butler later this week  - [facility] will continue to follow  INTERVENTIONAL IMPRESSIONS: - Patent ascending aortic graft  - Severe 2 vessel CAD with chronically occluded proximal RCA. There are   brisk collaterals from the LCA.  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 Butler later this week  Prepared and signed by  Celso Stockton MD  Signed 2020 09:33:32         EXAM:  XR CHEST PORTABLE URGENT 1V                          PROCEDURE DATE:  2021    INTERPRETATION:  Clinical history: 61-year-old male, chest pain.  Two expiratory/kyphotic views are compared to 2020 and are correlated with the abdominal CT of 3/12/2021.  Mild cardiomegaly and normal pulmonary vasculature with no gross consolidation, effusion, pneumothorax or acute osseous finding.  Mild-to-moderate perihilar interstitial prominence is most consistent with edema/infiltrate.  Patient is post sternotomy/CABG/cardiac valve surgery.  IMPRESSION:  Moderate interstitial edema/infiltrate with no gross consolidation, new     [place] CARDIAC ELECTROPHYSIOLOGY  [facility]   Office: [address] [address]  Telephone: [phone] Fax:[phone]      HPI:  60 y/o M with a PMHx of Cocaine abuse (quit ), CAD s/p remote stent to LAD and recent CABG x 2 (DGV-OM, RPDA with Dr. Butler 2020), AI and MR s/p tAVR, tMVR (2020 Dr. Butler) with post-op Afib/flutter(on eliquis), HFrEF (EF 45-50% ), Aortic dissection s/p repair (), surgery complicated by CVA w/ residual left sided weakness and bowel ischemia s/p bowel resection and ostomy with reversal repair, Covid infection, ESRD on HD (/Thur/Sat) s/p LUE AVF, HTN, seizure disorder, GIB s/p colonoscopy and AVMs cauterization 3/2021, and psoriasis who presented to the ER with complaints of shortness of breath and palpitations x 3 days. In ED, patient with noted elevated BNP (3K), CXR with moderate interstitial edema and in atrial flutter w/ RVR. EP called for arrythmia management.     Pt reports AF/AFL began postoperatively following with CABG x 2/AVR/MVR in  and has been on and off amiodarone since (follows with Dr Frankel). He does reports intermittent palpitations since his surgery but states that symptoms usually short lived, however, four days ago patient developed similar palpitations with associated fatigue and SOB but never resolved which prompted his to come to emergency department. Patient denies fevers, chills, syncope, chest pressure, abdominal pain, N/V/D, headache, hematuria, bloody/black stools or dizziness.     Cardiologist: Dr. Frankel  PCP: Dr Yo    Tele: AFL w/ average VR ~120bpm   EK2021; Atrial flutter w/ RVR   Cardiac cath 11/10/20: severe 2 vessel CAD.  Chronically occluded proximal RCA with collaterals from the LCA.  EF 50%.  Elevated LVEDP.    EKGs:  2021; Atrial Fibrillation   3/12/2021; AFL  3/13/2021; AFL  2021 Atrial Fibrillation   2020 AFL  20; AFL  20; AFL  20; AFL  20; AFL  20; AFL  20; AFL  20; AFL  20; AFL      PAST MEDICAL & SURGICAL HISTORY:  CHF (congestive heart failure)  CVA (cerebral vascular accident)  Seizures  last seizure 10 years ago  HTN (hypertension)  Hyperlipemia  COVID-2020  Atrial fibrillation  ESRD on dialysis  /Thurs/Sat  Former smoker  History of cocaine use  remote hx, currently sober  H/O aortic dissection  s/p repair (), surgery complicated by bowel ischemia s/p bowel resection and ostomy with reversal  H/O aortic valve insufficiency  Mitral regurgitation  Aorta disorder  H/O colectomy  Status post double vessel coronary artery bypass  S/P AVR (aortic valve replacement)  S/P MVR (mitral valve replacement)      REVIEW OF SYSTEMS:  CONSTITUTIONAL: No fever, weight loss, + fatigue  NECK: No pain or stiffness  CARDIOVASCULAR: AS PER HPI  RESPIRATORY: AS PER HPI  : No dysuria, no hematuria   GI: No dark color stool, no melena, no diarrhea, no constipation, no abdominal pain   NEURO: No headache, no dizziness, no slurred speech   MUSCULOSKELETAL: No joint pain or swelling. + muscle cramps  PSYCH: No agitation, no anxiety.      MEDICATIONS  (STANDING):  metoprolol tartrate 25 milliGRAM(s) Oral every 8 hours    MEDICATIONS  (PRN):  metoprolol tartrate Injectable 5 milliGRAM(s) IV Push every 6 hours PRN HR >120      Allergies  penicillin (Other)    SOCIAL HISTORY:  Prior history of cocaine abuse  Social alcohol   Tobacco 30x 3 ppd history. quit in     FAMILY HISTORY:  FH: hypertension  Family history of cardiac disorder (Mother)  mother      Vital Signs Last 24 Hrs  T(C): 36.7 (2021 11:28), Max: 36.8 (2021 05:26)  T(F): 98.1 (2021 11:28), Max: 98.3 (2021 05:26)  HR: 125 (2021 11:28) (123 - 130)  BP: 135/78 (2021 11:28) (135/78 - 173/106)  BP(mean): --  RR: 18 (2021 11:28) (16 - 29)  SpO2: 95% (2021 11:28) (93% - 100%)    Physical Exam:  Constitutional: Comfortable NAD  HEENT: NC/AT. No JVD  Respiratory: Bibasilar rales  Cardiovascular: S1S2 irregular   Gastrointestinal: Soft non-tender and non distended   Extremities: trace edema b/l.  LUE AVF, thrill/ bruit+  Psychiatric: Calm       LABS:                        9.7    7.57  )-----------( 154      ( 2021 06:43 )             30.2   06-24    146<H>  |  113<H>  |  41.1<H>  ----------------------------<  90  3.9   |  20.0<L>  |  2.00<H>    Ca    9.2      2021 06:43  Mg     1.9     06-24    TPro  7.4  /  Alb  4.2  /  TBili  1.6  /  DBili  x   /  AST  19  /  ALT  18  /  AlkPhos  103  06-24  LIVER FUNCTIONS - ( 2021 06:43 )  Alb: 4.2 g/dL / Pro: 7.4 g/dL / ALK PHOS: 103 U/L / ALT: 18 U/L / AST: 19 U/L / GGT: x           PT/INR - ( 2021 06:43 )   PT: 18.2 sec;   INR: 1.60 ratio         PTT - ( 2021 06:43 )  PTT:40.7 secCARDIAC MARKERS ( 2021 06:43 )  x     / <0.01 ng/mL / x     / x     / x              RADIOLOGY & ADDITIONAL STUDIES:    PREVIOUS DIAGNOSTIC TESTING  Echo 2021 (Office)  EF 45-50%, basal anteroseptal segment is akinetic, mid anteroseptal segment and basal inferoseptal segment are hypokinetic. Mild concentric LVH, moderate TR, Dilated aorta at sinuses of valsalva (4.0 cm) an ascending aorta (4.3 cm)      TTE 2020  Summary:   1. Left ventricular ejection fraction, by visual estimation, is 40%%.   2. Technically adequate study.   3. Moderately decreased global left ventricular systolic function.   4. Multiple left ventricular regional wall motion abnormalities exist. See wall motion findings.   5. The left ventricular diastolic function could not be assessed in this study.   6. Moderately reduced RV systolic function.   7. Trivial pericardial effusion.   8. A bioprosthetic valve is present in the mitral position.   9. Trace mitral valve regurgitation.  10. Moderate tricuspid regurgitation.  11. Bioprosthesis in the aortic position.  12. Estimated pulmonary artery systolic pressure is 40.9 mmHg assuming a right atrial pressure of 8 mmHg, which is consistent with mild pulmonary hypertension.  13. Endocardial visualization was enhanced with intravenous echo contrast.  14. Mitral valve mean gradient is 2.2 mmHg consistent with mild mitral stenosis.    MD Oriana Electronically signed on 2020 at 4:08:44 PM      CATHETERIZATION FINDINGS:   < from: Cardiac Cath Lab - Adult (11.10.20 @ 07:43) >    CORONARY VESSELS: The coronary circulation is right dominant.  LM:   --  LM: The left anterior descending and circumflex arteries arose  anomalously from separate ostia.  LAD:   --  LAD: Angiography showed moderate atherosclerosis.  CX:   --  Proximal circumflex: There was a discrete 60 % stenosis.  --  OM1: The vessel was small to medium sized. There was a discrete 80 %  stenosis. The lesion was irregularly contoured and eccentric.  --  OM2: The vessel was medium to large sized. There was a discrete 80 %  stenosis. The lesion was irregularly contoured, eccentric, and heavily  calcified.  RCA:   --  Proximal RCA: There was a 100 % stenosis. This lesion is a  chronic total occlusion. The distal vessel fills via collaterals from the  LCA.  AORTA: The root exhibited dilatation. Ascending aorta: The segment was  normal in size. Graft repair intact. Aortic arch: Normal. Known residual  Type B dissection by CTA. Descending aorta: The segment was normal in  size. Known residual Type B dissection by CTA.  ARCH VESSELS: Innominate: Known residual Type B dissection by CTA. Proximal  left subclavian: Known residual Type B dissection by CTA. LIMA: Normal.  TAMMY: Normal.  COMPLICATIONS: No complications occurred during the cath lab visit.  SUMMARY:  Summary: Addendum 20: Case reconfirmed to remove Bypass Graph Study,  duplicate Left Subclavian procedure,change contrast from 1665 to 165 and  add Inominate Angiography procedure  DIAGNOSTIC IMPRESSIONS: - Patent ascending aortic graft  - Severe 2 vessel CAD with chronically occluded proximal RCA. There are   brisk collaterals from the LCA.  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 Butler later this week  - [facility] will continue to follow  INTERVENTIONAL IMPRESSIONS: - Patent ascending aortic graft  - Severe 2 vessel CAD with chronically occluded proximal RCA. There are   brisk collaterals from the LCA.  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 Butler later this week  Prepared and signed by  Celso Stockton MD  Signed 2020 09:33:32         EXAM:  XR CHEST PORTABLE URGENT 1V                          PROCEDURE DATE:  2021    INTERPRETATION:  Clinical history: 61-year-old male, chest pain.  Two expiratory/kyphotic views are compared to 2020 and are correlated with the abdominal CT of 3/12/2021.  Mild cardiomegaly and normal pulmonary vasculature with no gross consolidation, effusion, pneumothorax or acute osseous finding.  Mild-to-moderate perihilar interstitial prominence is most consistent with edema/infiltrate.  Patient is post sternotomy/CABG/cardiac valve surgery.  IMPRESSION:  Moderate interstitial edema/infiltrate with no gross consolidation, new

## 2021-06-24 NOTE — ED PROVIDER NOTE - PMH
Atrial fibrillation    CHF (congestive heart failure)    COVID-19  october 2020  CVA (cerebral vascular accident)    ESRD on dialysis  Tu/Thurs/Sat  Former smoker    H/O aortic dissection  s/p repair (2010), surgery complicated by bowel ischemia s/p bowel resection and ostomy with reversal  H/O aortic valve insufficiency    History of cocaine use  remote hx, currently sober  HTN (hypertension)    Hyperlipemia    Mitral regurgitation    Seizures  last seizure 10 years ago

## 2021-06-24 NOTE — ED PROVIDER NOTE - ATTENDING CONTRIBUTION TO CARE
Marine: I performed a face to face bedside interview with patient regarding history of present illness, review of symptoms and past medical history. I completed an independent physical exam.  I have discussed patient's plan of care with advanced care provider.   I agree with note as stated above including HISTORY OF PRESENT ILLNESS, HIV, PAST MEDICAL/SURGICAL/FAMILY/SOCIAL HISTORY, ALLERGIES AND HOME MEDICATIONS, REVIEW OF SYSTEMS, PHYSICAL EXAM, MEDICAL DECISION MAKING and any PROGRESS NOTES during the time I functioned as the attending physician for this patient  unless otherwise noted. My brief assessment is as follows: 61M h/o afib, ESRD on HD t/th/sa, CAD, missed HD tues, p/w SOB, orthopnea and palpitations. Denies fever, CP, sick contacts, travel. Exam notable for tachycardic irregular rhythm, L basilar rales. trace bilateral LE edema. Plan for labs, metoprolol, lasix (patient makes urine), cxr, ecg, admission. Nephrology was consulted.

## 2021-06-24 NOTE — CONSULT NOTE ADULT - ASSESSMENT
1) ESRD on HD  2) Acute decompensated HFrEF  3) Afib with RVR  4) Anemia of CKD    HD TTS schedule; missed last HD session  Plan for HD today; Consent obtained  UF 2.5- 3 kg as tolerated  Hb low; REINALDO with HD

## 2021-06-24 NOTE — CONSULT NOTE ADULT - SUBJECTIVE AND OBJECTIVE BOX
[facility] DIVISION OF KIDNEY DISEASES AND HYPERTENSION -- INITIAL CONSULT NOTE  --------------------------------------------------------------------------------  HPI:  ''62 y/o M w/ a PMHx of ESRD on HD on Tu/Thur/Sat s/p LUE AVF, CAD s/p remote PCI, AT/Aflutter, aortic insufficiency and mitral regurgitation s/p bioAVR and bioMVR, CABG, HFrEF (EF 40%), ascending aortic dissection s/p surgery complicated by CVA with residual left-sided weakness, colon resection for bowel resection s/p ostomy with reversal, also hx HTN, seizure disorder, psoriasis presented to [facility] ED c/o sob and palpitations x 3 days. Pt states the palpitations began sunday night and the sob started yesterday. The pt said he felt "off" with associated dizziness and shaking when he was having palpitations. The pt missed his scheduled dialysis on tuesday 06/22. Pt went to see his PCP and did an EKG, recommended F/U with cardio but presented to the ED instead due to worsening sob. Pt was found resting comfortably in NAD upon arrival for examination. Pt has also been experienciong B/L LE cramping during dialysis and had to end the session early due to discomfort. Pt admits to recent consumption of electrolyte power supplements of a few servings in a day because of the cramping his doctor believed to be due to electrolytes. Pt denies fever, chills, HA, blurry vision, cough, chest pain, n/v/d, abdominal pain, back pain, leg pain/swelling, recent drug/stimulant use.''    Nephrology consulted for HD.      PAST HISTORY  --------------------------------------------------------------------------------  PAST MEDICAL & SURGICAL HISTORY:  CHF (congestive heart failure)    CVA (cerebral vascular accident)    Seizures  last seizure 10 years ago    HTN (hypertension)    Hyperlipemia    COVID-19 october 2020    Atrial fibrillation    ESRD on dialysis  Tu/Thurs/Sat    Former smoker    History of cocaine use  remote hx, currently sober    H/O aortic dissection  s/p repair (2010), surgery complicated by bowel ischemia s/p bowel resection and ostomy with reversal    H/O aortic valve insufficiency    Mitral regurgitation    Aorta disorder    H/O colectomy    Status post double vessel coronary artery bypass    S/P AVR (aortic valve replacement)    S/P MVR (mitral valve replacement)      FAMILY HISTORY:  FH: hypertension    Family history of cardiac disorder (Mother)  mother      PAST SOCIAL HISTORY:    ALLERGIES & MEDICATIONS  --------------------------------------------------------------------------------  Allergies    penicillin (Other)    Intolerances      Standing Inpatient Medications  metoprolol tartrate 25 milliGRAM(s) Oral every 8 hours    PRN Inpatient Medications  metoprolol tartrate Injectable 5 milliGRAM(s) IV Push every 6 hours PRN      REVIEW OF SYSTEMS  --------------------------------------------------------------------------------  Gen: No weight changes, fatigue, fevers/chills, weakness  Skin: No rashes  Head/Eyes/Ears/Mouth: No headache; Normal hearing; Normal vision w/o blurriness; No sinus pain/discomfort, sore throat  Respiratory: No dyspnea, cough, wheezing, hemoptysis  CV: No chest pain, PND, orthopnea  GI: No abdominal pain, diarrhea, constipation, nausea, vomiting, melena, hematochezia  : No increased frequency, dysuria, hematuria, nocturia  MSK: No joint pain/swelling; no back pain; no edema  Neuro: No dizziness/lightheadedness, weakness, seizures, numbness, tingling  Heme: No easy bruising or bleeding  Endo: No heat/cold intolerance  Psych: No significant nervousness, anxiety, stress, depression    All other systems were reviewed and are negative, except as noted.    VITALS/PHYSICAL EXAM  --------------------------------------------------------------------------------  T(C): 36.8 (06-24-21 @ 05:26), Max: 36.8 (06-24-21 @ 05:26)  HR: 123 (06-24-21 @ 07:18) (123 - 130)  BP: 155/81 (06-24-21 @ 07:18) (155/81 - 173/106)  RR: 18 (06-24-21 @ 07:56) (16 - 29)  SpO2: 100% (06-24-21 @ 07:56) (93% - 100%)  Wt(kg): --  Height (cm): 175.3 (06-24-21 @ 05:26)  Weight (kg): 76 (06-24-21 @ 05:26)  BMI (kg/m2): 24.7 (06-24-21 @ 05:26)  BSA (m2): 1.92 (06-24-21 @ 05:26)      Physical Exam:  	Gen: NAD, well-appearing  	HEENT: PERRL, supple neck, clear oropharynx  	Pulm: CTA B/L  	CV: RRR, S1S2; no rub  	Back: No spinal or CVA tenderness; no sacral edema  	Abd: +BS, soft, nontender/nondistended  	: No suprapubic tenderness  	UE: Warm, FROM, no clubbing, intact strength; no edema; no asterixis  	LE: Warm, FROM, no clubbing, intact strength; no edema  	Neuro: No focal deficits, intact gait  	Psych: Normal affect and mood  	Skin: Warm, without rashes  	Vascular access:    LABS/STUDIES  --------------------------------------------------------------------------------              9.7    7.57  >-----------<  154      [06-24-21 @ 06:43]              30.2     146  |  113  |  41.1  ----------------------------<  90      [06-24-21 @ 06:43]  3.9   |  20.0  |  2.00        Ca     9.2     [06-24-21 @ 06:43]      Mg     1.9     [06-24-21 @ 06:43]    TPro  7.4  /  Alb  4.2  /  TBili  1.6  /  DBili  x   /  AST  19  /  ALT  18  /  AlkPhos  103  [06-24-21 @ 06:43]    PT/INR: PT 18.2 , INR 1.60       [06-24-21 @ 06:43]  PTT: 40.7       [06-24-21 @ 06:43]    Troponin <0.01      [06-24-21 @ 06:43]    Creatinine Trend:  SCr 2.00 [06-24 @ 06:43]        Iron 29, TIBC 227, %sat 13      [12-05-20 @ 08:05]  Ferritin 1099      [12-05-20 @ 08:05]  TSH 1.95      [11-03-20 @ 08:18]  Lipid: chol --, , HDL --, LDL --      [11-15-20 @ 02:40]    HBsAb 6.5      [12-07-20 @ 14:20]  HBsAg Nonreact      [12-07-20 @ 14:20]  HBcAb Nonreact      [11-07-20 @ 05:41]  HCV 0.10, Nonreact      [12-07-20 @ 14:20]    SPEP Interpretation: Normal Electrophoresis Pattern      [12-05-20 @ 08:05]   [facility] DIVISION OF KIDNEY DISEASES AND HYPERTENSION -- INITIAL CONSULT NOTE  --------------------------------------------------------------------------------  HPI:  ''60 y/o M w/ a PMHx of ESRD on HD on Tu/Thur/Sat s/p LUE AVF, CAD s/p remote PCI, AT/Aflutter, aortic insufficiency and mitral regurgitation s/p bioAVR and bioMVR, CABG, HFrEF (EF 40%), ascending aortic dissection s/p surgery complicated by CVA with residual left-sided weakness, colon resection for bowel resection s/p ostomy with reversal, also hx HTN, seizure disorder, psoriasis presented to [facility] ED c/o sob and palpitations x 3 days. Pt states the palpitations began sunday night and the sob started yesterday. The pt said he felt "off" with associated dizziness and shaking when he was having palpitations. The pt missed his scheduled dialysis on tuesday 06/22. Pt went to see his PCP and did an EKG, recommended F/U with cardio but presented to the ED instead due to worsening sob. Pt was found resting comfortably in NAD upon arrival for examination. Pt has also been experienciong B/L LE cramping during dialysis and had to end the session early due to discomfort. Pt admits to recent consumption of electrolyte power supplements of a few servings in a day because of the cramping his doctor believed to be due to electrolytes. Pt denies fever, chills, HA, blurry vision, cough, chest pain, n/v/d, abdominal pain, back pain, leg pain/swelling, recent drug/stimulant use.''    Nephrology consulted for HD.  Pt seen and examined; feels better now.  Gets HD TTS schedule at [facility]. Last HD was on Saturday, missed Tuesday session due to ongoing symptoms.    PAST HISTORY  --------------------------------------------------------------------------------  PAST MEDICAL & SURGICAL HISTORY:  CHF (congestive heart failure)    CVA (cerebral vascular accident)    Seizures  last seizure 10 years ago    HTN (hypertension)    Hyperlipemia    COVID-19  october 2020    Atrial fibrillation    ESRD on dialysis  Tu/Thurs/Sat    Former smoker    History of cocaine use  remote hx, currently sober    H/O aortic dissection  s/p repair (2010), surgery complicated by bowel ischemia s/p bowel resection and ostomy with reversal    H/O aortic valve insufficiency    Mitral regurgitation    Aorta disorder    H/O colectomy    Status post double vessel coronary artery bypass    S/P AVR (aortic valve replacement)    S/P MVR (mitral valve replacement)      FAMILY HISTORY:  FH: hypertension    Family history of cardiac disorder (Mother)  mother      PAST SOCIAL HISTORY: lives at home    ALLERGIES & MEDICATIONS  --------------------------------------------------------------------------------  Allergies  penicillin (Other)      Standing Inpatient Medications  metoprolol tartrate 25 milliGRAM(s) Oral every 8 hours    PRN Inpatient Medications  metoprolol tartrate Injectable 5 milliGRAM(s) IV Push every 6 hours PRN      REVIEW OF SYSTEMS  --------------------------------------------------------------------------------  Gen: No weight changes, fatigue, fevers/chills, weakness  Skin: No rashes  Head/Eyes/Ears/Mouth: No headache; Normal hearing; Normal vision w/o blurriness  Respiratory: No dyspnea, cough, wheezing, hemoptysis  CV: No chest pain, PND, orthopnea  GI: No abdominal pain, diarrhea, constipation, nausea, vomiting  : No increased frequency, dysuria, hematuria, nocturia  MSK: No joint pain/swelling; no back pain; no edema  Neuro: No dizziness/lightheadedness, weakness  Heme: No easy bruising or bleeding  Endo: No heat/cold intolerance  Psych: No significant nervousness, anxiety, stress, depression    All other systems were reviewed and are negative, except as noted.    VITALS/PHYSICAL EXAM  --------------------------------------------------------------------------------  T(C): 36.8 (06-24-21 @ 05:26), Max: 36.8 (06-24-21 @ 05:26)  HR: 123 (06-24-21 @ 07:18) (123 - 130)  BP: 155/81 (06-24-21 @ 07:18) (155/81 - 173/106)  RR: 18 (06-24-21 @ 07:56) (16 - 29)  SpO2: 100% (06-24-21 @ 07:56) (93% - 100%)  Wt(kg): --  Height (cm): 175.3 (06-24-21 @ 05:26)  Weight (kg): 76 (06-24-21 @ 05:26)  BMI (kg/m2): 24.7 (06-24-21 @ 05:26)  BSA (m2): 1.92 (06-24-21 @ 05:26)      Physical Exam:  	Gen: NAD  	HEENT: supple neck, clear oropharynx  	Pulm: b/l crackles+  	CV: RRR, S1S2; no rub  	Back: No spinal or CVA tenderness; no sacral edema  	Abd: +BS, soft, nontender/nondistended  	: No suprapubic tenderness  	UE: Warm, no edema  	Neuro: No focal deficit  	Psych: Normal affect and mood  	Skin: Warm	              Access: LUE AVF, thrill/ bruit+    LABS/STUDIES  --------------------------------------------------------------------------------              9.7    7.57  >-----------<  154      [06-24-21 @ 06:43]              30.2     146  |  113  |  41.1  ----------------------------<  90      [06-24-21 @ 06:43]  3.9   |  20.0  |  2.00        Ca     9.2     [06-24-21 @ 06:43]      Mg     1.9     [06-24-21 @ 06:43]    TPro  7.4  /  Alb  4.2  /  TBili  1.6  /  DBili  x   /  AST  19  /  ALT  18  /  AlkPhos  103  [06-24-21 @ 06:43]    PT/INR: PT 18.2 , INR 1.60       [06-24-21 @ 06:43]  PTT: 40.7       [06-24-21 @ 06:43]    Troponin <0.01      [06-24-21 @ 06:43]    Creatinine Trend:  SCr 2.00 [06-24 @ 06:43]        Iron 29, TIBC 227, %sat 13      [12-05-20 @ 08:05]  Ferritin 1099      [12-05-20 @ 08:05]  TSH 1.95      [11-03-20 @ 08:18]  Lipid: chol --, , HDL --, LDL --      [11-15-20 @ 02:40]    HBsAb 6.5      [12-07-20 @ 14:20]  HBsAg Nonreact      [12-07-20 @ 14:20]  HBcAb Nonreact      [11-07-20 @ 05:41]  HCV 0.10, Nonreact      [12-07-20 @ 14:20]    SPEP Interpretation: Normal Electrophoresis Pattern      [12-05-20 @ 08:05]

## 2021-06-24 NOTE — ED ADULT NURSE REASSESSMENT NOTE - NS ED NURSE REASSESS COMMENT FT1
patient having increased work of breathing, states he feels palitations. with shortness of breath. afebrile.

## 2021-06-24 NOTE — H&P ADULT - NSHPSOCIALHISTORY_GEN_ALL_CORE
Pt denies currently using cigarettes, e-cigarettes or tobacco but was a former smoker 10 years ago. Pt admits to drinking one large cup of coffee per day. Pt denies currently using alcohol or illicit drugs.

## 2021-06-25 LAB
ANION GAP SERPL CALC-SCNC: 11 MMOL/L — SIGNIFICANT CHANGE UP (ref 5–17)
BUN SERPL-MCNC: 33.4 MG/DL — HIGH (ref 8–20)
CALCIUM SERPL-MCNC: 9.1 MG/DL — SIGNIFICANT CHANGE UP (ref 8.6–10.2)
CHLORIDE SERPL-SCNC: 104 MMOL/L — SIGNIFICANT CHANGE UP (ref 98–107)
CO2 SERPL-SCNC: 27 MMOL/L — SIGNIFICANT CHANGE UP (ref 22–29)
COVID-19 SPIKE DOMAIN AB INTERP: POSITIVE
COVID-19 SPIKE DOMAIN ANTIBODY RESULT: >250 U/ML — HIGH
CREAT SERPL-MCNC: 1.99 MG/DL — HIGH (ref 0.5–1.3)
GLUCOSE SERPL-MCNC: 90 MG/DL — SIGNIFICANT CHANGE UP (ref 70–99)
HBV SURFACE AB SER-ACNC: <3 MIU/ML — LOW
HBV SURFACE AG SER-ACNC: SIGNIFICANT CHANGE UP
HCT VFR BLD CALC: 28.9 % — LOW (ref 39–50)
HGB BLD-MCNC: 9.4 G/DL — LOW (ref 13–17)
MAGNESIUM SERPL-MCNC: 1.9 MG/DL — SIGNIFICANT CHANGE UP (ref 1.6–2.6)
MCHC RBC-ENTMCNC: 30.5 PG — SIGNIFICANT CHANGE UP (ref 27–34)
MCHC RBC-ENTMCNC: 32.5 GM/DL — SIGNIFICANT CHANGE UP (ref 32–36)
MCV RBC AUTO: 93.8 FL — SIGNIFICANT CHANGE UP (ref 80–100)
MYOGLOBIN SERPL-MCNC: 111 NG/ML — HIGH (ref 28–72)
PLATELET # BLD AUTO: 149 K/UL — LOW (ref 150–400)
POTASSIUM SERPL-MCNC: 3.5 MMOL/L — SIGNIFICANT CHANGE UP (ref 3.5–5.3)
POTASSIUM SERPL-SCNC: 3.5 MMOL/L — SIGNIFICANT CHANGE UP (ref 3.5–5.3)
RBC # BLD: 3.08 M/UL — LOW (ref 4.2–5.8)
RBC # FLD: 13.3 % — SIGNIFICANT CHANGE UP (ref 10.3–14.5)
SARS-COV-2 IGG+IGM SERPL QL IA: >250 U/ML — HIGH
SARS-COV-2 IGG+IGM SERPL QL IA: POSITIVE
SODIUM SERPL-SCNC: 142 MMOL/L — SIGNIFICANT CHANGE UP (ref 135–145)
TSH SERPL-MCNC: 1.68 UIU/ML — SIGNIFICANT CHANGE UP (ref 0.27–4.2)
WBC # BLD: 6.16 K/UL — SIGNIFICANT CHANGE UP (ref 3.8–10.5)
WBC # FLD AUTO: 6.16 K/UL — SIGNIFICANT CHANGE UP (ref 3.8–10.5)

## 2021-06-25 PROCEDURE — 71275 CT ANGIOGRAPHY CHEST: CPT | Mod: 26

## 2021-06-25 PROCEDURE — 74174 CTA ABD&PLVS W/CONTRAST: CPT | Mod: 26

## 2021-06-25 PROCEDURE — 99233 SBSQ HOSP IP/OBS HIGH 50: CPT

## 2021-06-25 PROCEDURE — 93325 DOPPLER ECHO COLOR FLOW MAPG: CPT | Mod: 26

## 2021-06-25 PROCEDURE — 93320 DOPPLER ECHO COMPLETE: CPT | Mod: 26

## 2021-06-25 PROCEDURE — 99232 SBSQ HOSP IP/OBS MODERATE 35: CPT

## 2021-06-25 PROCEDURE — 99233 SBSQ HOSP IP/OBS HIGH 50: CPT | Mod: 25

## 2021-06-25 PROCEDURE — 93312 ECHO TRANSESOPHAGEAL: CPT | Mod: 26

## 2021-06-25 RX ORDER — WARFARIN SODIUM 2.5 MG/1
1 TABLET ORAL
Qty: 0 | Refills: 0 | DISCHARGE

## 2021-06-25 RX ORDER — ASCORBIC ACID 60 MG
500 TABLET,CHEWABLE ORAL DAILY
Refills: 0 | Status: DISCONTINUED | OUTPATIENT
Start: 2021-06-25 | End: 2021-06-29

## 2021-06-25 RX ORDER — GABAPENTIN 400 MG/1
100 CAPSULE ORAL AT BEDTIME
Refills: 0 | Status: DISCONTINUED | OUTPATIENT
Start: 2021-06-25 | End: 2021-06-29

## 2021-06-25 RX ORDER — ATORVASTATIN CALCIUM 80 MG/1
40 TABLET, FILM COATED ORAL AT BEDTIME
Refills: 0 | Status: DISCONTINUED | OUTPATIENT
Start: 2021-06-25 | End: 2021-06-29

## 2021-06-25 RX ADMIN — Medication 25 MILLIGRAM(S): at 22:58

## 2021-06-25 RX ADMIN — ATORVASTATIN CALCIUM 40 MILLIGRAM(S): 80 TABLET, FILM COATED ORAL at 22:58

## 2021-06-25 RX ADMIN — GABAPENTIN 100 MILLIGRAM(S): 400 CAPSULE ORAL at 22:58

## 2021-06-25 RX ADMIN — Medication 25 MILLIGRAM(S): at 12:40

## 2021-06-25 RX ADMIN — Medication 25 MILLIGRAM(S): at 05:16

## 2021-06-25 RX ADMIN — APIXABAN 5 MILLIGRAM(S): 2.5 TABLET, FILM COATED ORAL at 05:16

## 2021-06-25 NOTE — PROGRESS NOTE ADULT - SUBJECTIVE AND OBJECTIVE BOX
Subjective: Pt seen and examined, remains in AF/AFL (HR ~110bpm).  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  Pt with a known chronic type B dissection noted on preop CT scan (11/2020), however no mention of issues in the ascending aorta. Findings d/w Dr Harry and CTS to evaluate patient. Pt is hemodynamically stable.       PHYSICIAN INTERPRETATION:  Left Ventricle:  Global LV systolic function was mildly decreased. Left ventricular ejection fraction, by visual estimation, is 40 to 45%. Abnormal (paradoxical) septal motion consistent with post-operative status. The mitral in-flow pattern reveals no discernable A-wave, therefore no comment on diastolic function can be made.  Right Ventricle: The right ventricular size is normal. RV systolic function is moderately reduced.  Left Atrium: Left atrial enlargement. No left atrial appendage thrombus is seen.  Right Atrium: Normal right atrial size.  Pericardium: Trivial pericardial effusion is present.  Mitral Valve: There is a mitral valve prosthesis with moderate para-valvular leak.  Tricuspid Valve: The tricuspid valve is normal in structure. Mild tricuspid regurgitation is visualized.  Aortic Valve: There is a well seated aortic valve prosthesis. Acceleration time is 90 msec, suggesting normal function of aortic bioprosthesis.  Aorta: Patient is s/p aortic root repair with type A dissection in 2010. Synthetic graft is seen in the ascending aorta. There is a dissection flap seen in the arch, at least involving the left brachiocephalic artery. There is a small true lumen and a largerfalse lumen which appears mostly thrombosed. There is connection between the true and false lumens with the probe is about 45 cm from the incisors.      Summary:   1. Technically good study.   2. Mildly decreased global left ventricular systolic function.   3. Left ventricular ejection fraction, by visual estimation, is 40 to 45%.   4. Left atrial enlargement.   5. Abnormal septal motion consistent with post-operative status.   6. The mitral in-flow pattern reveals no discernable A-wave, therefore no comment on diastolic function can be made.   7. Moderately reduced RV systolic function.   8. Normal right atrial size.   9. There is a mitral valve prosthesis with moderate para-valvular leak.  10. Mild tricuspid regurgitation.  11. There is a well seated aortic valve prosthesis. Acceleration time is 90 msec, suggesting normal function of aortic bioprosthesis.  12. Patient is s/p aortic root repair with type A dissection in 2010. Synthetic graft is seen in the ascending aorta. There is a dissection flap seen in the arch, at least involving the left brachiocephalic artery. There is a small true lumen and a larger false lumen which appears mostly thrombosed. There is connection between the true and false lumens with the probe is about 45 cm from the incisors.  13. Trivial pericardial effusion.  14. Images were reviewed with Dr. Virk.    MD Gennaro Electronically signed on 6/25/2021 at 6:49:51 PM      MEDICATIONS  (STANDING):  apixaban 5 milliGRAM(s) Oral two times a day  ascorbic acid 500 milliGRAM(s) Oral daily  atorvastatin 40 milliGRAM(s) Oral at bedtime  gabapentin 100 milliGRAM(s) Oral at bedtime  metoprolol tartrate 25 milliGRAM(s) Oral every 8 hours  multivitamin 1 Tablet(s) Oral daily    MEDICATIONS  (PRN):  metoprolol tartrate Injectable 5 milliGRAM(s) IV Push every 6 hours PRN HR >120    Allergies  penicillin (Other)    Vital Signs Last 24 Hrs  T(C): 36.5 (25 Jun 2021 15:38), Max: 37 (25 Jun 2021 07:58)  T(F): 97.7 (25 Jun 2021 15:38), Max: 98.6 (25 Jun 2021 07:58)  HR: 98 (25 Jun 2021 15:38) (88 - 105)  BP: 134/72 (25 Jun 2021 15:38) (107/61 - 134/72)  RR: 18 (25 Jun 2021 15:38) (18 - 18)  SpO2: 98% (25 Jun 2021 15:38) (94% - 98%)    Physical Exam:  Constitutional: Comfortable NAD, sleepy from anesthesia  Respiratory: Lungs CTA B/L  Cardiovascular: S1S2 irregular, tachycardic   Gastrointestinal: Soft non-tender and non distended   Extremities: No edema b/l.  LUE AVF, thrill/ bruit+    LABS:                        9.4    6.16  )-----------( 149      ( 25 Jun 2021 07:34 )             28.9     06-25    142  |  104  |  33.4<H>  ----------------------------<  90  3.5   |  27.0  |  1.99<H>    Ca    9.1      25 Jun 2021 07:34  Mg     1.9     06-25    TPro  7.4  /  Alb  4.2  /  TBili  1.6  /  DBili  x   /  AST  19  /  ALT  18  /  AlkPhos  103  06-24    PT/INR - ( 24 Jun 2021 06:43 )   PT: 18.2 sec;   INR: 1.60 ratio         PTT - ( 24 Jun 2021 06:43 )  PTT:40.7 sec      RADIOLOGY & ADDITIONAL TESTS:

## 2021-06-25 NOTE — PROGRESS NOTE ADULT - SUBJECTIVE AND OBJECTIVE BOX
[facility] [place] DIVISION OF KIDNEY DISEASES AND HYPERTENSION -- FOLLOW UP NOTE  --------------------------------------------------------------------------------  Chief Complaint:  ESRD    24 hour events/subjective:  Seen/examined  HD in AM;      PAST HISTORY  --------------------------------------------------------------------------------  No significant changes to PMH, PSH, FHx, SHx, unless otherwise noted    ALLERGIES & MEDICATIONS  --------------------------------------------------------------------------------  Allergies    penicillin (Other)    Intolerances      Standing Inpatient Medications  apixaban 5 milliGRAM(s) Oral two times a day  metoprolol tartrate 25 milliGRAM(s) Oral every 8 hours    PRN Inpatient Medications  metoprolol tartrate Injectable 5 milliGRAM(s) IV Push every 6 hours PRN      REVIEW OF SYSTEMS  --------------------------------------------------------------------------------  Gen: No weight changes, fatigue, fevers/chills, weakness  Skin: No rashes  Head/Eyes/Ears/Mouth: No headache; Normal hearing; Normal vision w/o blurriness; No sinus pain/discomfort, sore throat  Respiratory: No dyspnea, cough, wheezing, hemoptysis  CV: No chest pain, PND, orthopnea  GI: No abdominal pain, diarrhea, constipation, nausea, vomiting, melena, hematochezia  : No increased frequency, dysuria, hematuria, nocturia  MSK: No joint pain/swelling; no back pain; no edema  Neuro: No dizziness/lightheadedness, weakness, seizures, numbness, tingling  Heme: No easy bruising or bleeding  Endo: No heat/cold intolerance  Psych: No significant nervousness, anxiety, stress, depression    All other systems were reviewed and are negative, except as noted.    VITALS/PHYSICAL EXAM  --------------------------------------------------------------------------------  T(C): 37 (06-25-21 @ 07:58), Max: 37.2 (06-24-21 @ 16:45)  HR: 88 (06-25-21 @ 12:41) (88 - 106)  BP: 133/70 (06-25-21 @ 12:41) (107/61 - 146/84)  RR: 18 (06-25-21 @ 12:41) (18 - 18)  SpO2: 97% (06-25-21 @ 12:41) (94% - 100%)  Wt(kg): --  Height (cm): 175.3 (06-24-21 @ 05:26)  Weight (kg): 76 (06-24-21 @ 05:26)  BMI (kg/m2): 24.7 (06-24-21 @ 05:26)  BSA (m2): 1.92 (06-24-21 @ 05:26)      06-24-21 @ 07:01  -  06-25-21 @ 07:00  --------------------------------------------------------  IN: 0 mL / OUT: 0 mL / NET: 0 mL      Physical Exam:  	Gen: NAD,   	HEENT: PERRL, supple neck, clear oropharynx  	Pulm: CTA B/L  	CV: RRR, S1S2; no rub  	Back: No spinal or CVA tenderness; no sacral edema  	Abd: +BS, soft, nontender/nondistended  	: No suprapubic tenderness  	UE: Warm, FROM, no clubbing, intact strength; no edema; no asterixis  	LE: Warm, FROM, no clubbing, intact strength; no edema  	Neuro: No focal deficits, intact gait  	Psych: Normal affect and mood  	Skin: Warm, without rashes  	Vascular access:    LABS/STUDIES  --------------------------------------------------------------------------------              9.4    6.16  >-----------<  149      [06-25-21 @ 07:34]              28.9     142  |  104  |  33.4  ----------------------------<  90      [06-25-21 @ 07:34]  3.5   |  27.0  |  1.99        Ca     9.1     [06-25-21 @ 07:34]      Mg     1.9     [06-25-21 @ 07:34]    TPro  7.4  /  Alb  4.2  /  TBili  1.6  /  DBili  x   /  AST  19  /  ALT  18  /  AlkPhos  103  [06-24-21 @ 06:43]    PT/INR: PT 18.2 , INR 1.60       [06-24-21 @ 06:43]  PTT: 40.7       [06-24-21 @ 06:43]    Troponin <0.01      [06-24-21 @ 19:51]    Creatinine Trend:  SCr 1.99 [06-25 @ 07:34]  SCr 2.00 [06-24 @ 06:43]        Iron 29, TIBC 227, %sat 13      [12-05-20 @ 08:05]  Ferritin 1099      [12-05-20 @ 08:05]  TSH 1.68      [06-25-21 @ 07:34]  Lipid: chol --, , HDL --, LDL --      [11-15-20 @ 02:40]    HBsAb <3.0      [06-25-21 @ 02:07]  HBsAg Nonreact      [06-25-21 @ 02:07]

## 2021-06-25 NOTE — PROGRESS NOTE ADULT - SUBJECTIVE AND OBJECTIVE BOX
[place] [facility]                                                               Office: [address] [address]                                                              Telephone: [phone]. Fax:[phone]                                                                             PROGRESS NOTE  Reason for follow up: sob and palpitations x 3 days  Overnight: No new events   Update: 6/25: Pt denies chest pain, palpitations, SOB. Tele- Afib with PVCs 80-90s. Pt planned for MUSTAPHA today 6/25.     Subjective: No new events  	  Vitals:  T(C): 37 (06-25-21 @ 07:58), Max: 37.2 (06-24-21 @ 16:45)  HR: 89 (06-25-21 @ 07:58) (89 - 125)  BP: 112/73 (06-25-21 @ 07:58) (107/61 - 150/77)  RR: 18 (06-25-21 @ 07:58) (18 - 18)  SpO2: 95% (06-25-21 @ 07:58) (94% - 100%)    I&O's Summary    24 Jun 2021 07:01  -  25 Jun 2021 07:00  --------------------------------------------------------  IN: 0 mL / OUT: 0 mL / NET: 0 mL      Weight (kg): 76 (06-24 @ 05:26)      PHYSICAL EXAM:  Appearance: Comfortable. No acute distress  HEENT:  Head and neck: Atraumatic. Normocephalic.  Normal oral mucosa, PERRL, Neck is supple. No JVD, No carotid bruit.   Neurologic: A & O x 3, no focal deficits. EOMI   Lymphatic: No cervical lymphadenopathy   Cardiovascular: Normal S1 S2, No murmur, rubs/gallops, No JVD, No edema  Respiratory: Lungs clear to auscultation  Gastrointestinal: Soft, Non-tender, + BS  Lower Extremities: No edema  Psychiatry: Patient is calm. No agitation. Mood & affect appropriate  Skin: No rashes/ ecchymoses/cyanosis/ulcers visualized on the face, hands or feet       CURRENT MEDICATIONS:  metoprolol tartrate 25 milliGRAM(s) Oral every 8 hours  metoprolol tartrate Injectable 5 milliGRAM(s) IV Push every 6 hours PRN  apixaban    DIAGNOSTIC TESTING:    [ ] Echocardiogram: < from: TTE Echo Complete w/o Contrast w/ Doppler (11.23.20 @ 13:54) >  Summary:   1. Left ventricular ejection fraction, by visual estimation, is 40%.   2. Technically adequate study.   3. Moderately decreased global left ventricular systolic function.   4. Multiple left ventricular regional wall motion abnormalities exist. See wall motion findings.   5. The left ventricular diastolic function could not be assessed in this study.   6. Moderately reduced RV systolic function.   7. Trivial pericardial effusion.   8. A bioprosthetic valve is present in the mitral position.   9. Trace mitral valve regurgitation.  10. Moderate tricuspid regurgitation.  11. Bioprosthesis in the aortic position.  12. Estimated pulmonary artery systolic pressure is 40.9 mmHg assuming a right atrial pressure of 8 mmHg, which is consistent with mild pulmonary hypertension.  13. Endocardial visualization was enhanced with intravenous echo contrast.  14. Mitral valve mean gradient is 2.2 mmHg consistent with mild mitral stenosis.      OTHER:   	  Xray:< from: Xray Chest 1 View- PORTABLE-Urgent (06.24.21 @ 06:56) >  IMPRESSION:  Moderate interstitial edema/infiltrate with no gross consolidation, new      LABS:	 	  CARDIAC MARKERS ( 24 Jun 2021 19:51 )  x     / <0.01 ng/mL / x     / x     / x      p-BNP 24 Jun 2021 19:51: x    , CARDIAC MARKERS ( 24 Jun 2021 06:43 )  x     / <0.01 ng/mL / x     / x     / x      p-BNP 24 Jun 2021 06:43: 3677 pg/mL                          9.4    6.16  )-----------( 149      ( 25 Jun 2021 07:34 )             28.9     06-25    142  |  104  |  33.4<H>  ----------------------------<  90  3.5   |  27.0  |  1.99<H>    Ca    9.1      25 Jun 2021 07:34  Mg     1.9     06-25    TPro  7.4  /  Alb  4.2  /  TBili  1.6  /  DBili  x   /  AST  19  /  ALT  18  /  AlkPhos  103  06-24    proBNP: Serum Pro-Brain Natriuretic Peptide: 3677 pg/mL (06-24 @ 06:43)    TSH: Thyroid Stimulating Hormone, Serum: 1.68 uIU/mL        TELEMETRY: Afib with PVCs 80-90s   	                                                                      [place] [facility]                                                               Office: [address] [address]                                                              Telephone: [phone]. Fax:[phone]                                                                             PROGRESS NOTE  Reason for follow up: sob and palpitations x 3 days  Overnight: No new events   Update: 6/25: Pt denies chest pain, palpitations, SOB. Tele- Afib with PVCs 80-90s. Pt planned for MUSTAPHA today 6/25.     Subjective: No new events  	  Vitals:  T(C): 37 (06-25-21 @ 07:58), Max: 37.2 (06-24-21 @ 16:45)  HR: 89 (06-25-21 @ 07:58) (89 - 125)  BP: 112/73 (06-25-21 @ 07:58) (107/61 - 150/77)  RR: 18 (06-25-21 @ 07:58) (18 - 18)  SpO2: 95% (06-25-21 @ 07:58) (94% - 100%)    I&O's Summary    24 Jun 2021 07:01  -  25 Jun 2021 07:00  --------------------------------------------------------  IN: 0 mL / OUT: 0 mL / NET: 0 mL      Weight (kg): 76 (06-24 @ 05:26)      PHYSICAL EXAM:  Appearance: Comfortable. No acute distress  HEENT:  Head and neck: Atraumatic. Normocephalic.  Normal oral mucosa, PERRL, Neck is supple. No JVD, No carotid bruit.   Neurologic: A & O x 3, no focal deficits. EOMI   Lymphatic: No cervical lymphadenopathy   Cardiovascular: Normal S1 S2, No murmur, rubs/gallops, No JVD, No edema, IRRR  Respiratory: Lungs clear to auscultation  Gastrointestinal: Soft, Non-tender, + BS  Lower Extremities: No edema  Psychiatry: Patient is calm. No agitation. Mood & affect appropriate  Skin: No rashes/ ecchymoses/cyanosis/ulcers visualized on the face, hands or feet       CURRENT MEDICATIONS:  metoprolol tartrate 25 milliGRAM(s) Oral every 8 hours  metoprolol tartrate Injectable 5 milliGRAM(s) IV Push every 6 hours PRN  apixaban    DIAGNOSTIC TESTING:    [ ] Echocardiogram: < from: TTE Echo Complete w/o Contrast w/ Doppler (11.23.20 @ 13:54) >  Summary:   1. Left ventricular ejection fraction, by visual estimation, is 40%.   2. Technically adequate study.   3. Moderately decreased global left ventricular systolic function.   4. Multiple left ventricular regional wall motion abnormalities exist. See wall motion findings.   5. The left ventricular diastolic function could not be assessed in this study.   6. Moderately reduced RV systolic function.   7. Trivial pericardial effusion.   8. A bioprosthetic valve is present in the mitral position.   9. Trace mitral valve regurgitation.  10. Moderate tricuspid regurgitation.  11. Bioprosthesis in the aortic position.  12. Estimated pulmonary artery systolic pressure is 40.9 mmHg assuming a right atrial pressure of 8 mmHg, which is consistent with mild pulmonary hypertension.  13. Endocardial visualization was enhanced with intravenous echo contrast.  14. Mitral valve mean gradient is 2.2 mmHg consistent with mild mitral stenosis.      OTHER:   	  Xray:< from: Xray Chest 1 View- PORTABLE-Urgent (06.24.21 @ 06:56) >  IMPRESSION:  Moderate interstitial edema/infiltrate with no gross consolidation, new      LABS:	 	  CARDIAC MARKERS ( 24 Jun 2021 19:51 )  x     / <0.01 ng/mL / x     / x     / x      p-BNP 24 Jun 2021 19:51: x    , CARDIAC MARKERS ( 24 Jun 2021 06:43 )  x     / <0.01 ng/mL / x     / x     / x      p-BNP 24 Jun 2021 06:43: 3677 pg/mL                          9.4    6.16  )-----------( 149      ( 25 Jun 2021 07:34 )             28.9     06-25    142  |  104  |  33.4<H>  ----------------------------<  90  3.5   |  27.0  |  1.99<H>    Ca    9.1      25 Jun 2021 07:34  Mg     1.9     06-25    TPro  7.4  /  Alb  4.2  /  TBili  1.6  /  DBili  x   /  AST  19  /  ALT  18  /  AlkPhos  103  06-24    proBNP: Serum Pro-Brain Natriuretic Peptide: 3677 pg/mL (06-24 @ 06:43)    TSH: Thyroid Stimulating Hormone, Serum: 1.68 uIU/mL        TELEMETRY: Afib with PVCs 80-90s

## 2021-06-25 NOTE — PROGRESS NOTE ADULT - SUBJECTIVE AND OBJECTIVE BOX
Pt is with sob and palpitations x 3 days   seen in am , chart reviewed     he feels well, no acute complaints     denies CP or dyspnea   cardiology follow up appreciated            Vital Signs Last 24 Hrs  T(F): 98.6 (25 Jun 2021 07:58), Max: 98.9 (24 Jun 2021 16:45)  HR: 88 (25 Jun 2021 12:41) (88 - 106)  BP: 133/70 (25 Jun 2021 12:41) (107/61 - 146/84)  RR: 18 (25 Jun 2021 12:41) (18 - 18)  SpO2: 97% (25 Jun 2021 12:41) (94% - 100%)  I&O's Summary    24 Jun 2021 07:01  -  25 Jun 2021 07:00  --------------------------------------------------------  IN: 0 mL / OUT: 0 mL / NET: 0 mL        PHYSICAL EXAM:  General: awake alert , no distress   Neck: supple, no JVD   Lungs: CTA bilateral   Cardio: RRR, S1/S2, No murmur  Abdomen: Soft, Nontender, Nondistended; Bowel sounds present  Extremities: No calf tenderness, No pitting edema    LABS:                        9.4    6.16  )-----------( 149      ( 25 Jun 2021 07:34 )             28.9     06-25    142  |  104  |  33.4  ----------------------------<  90  3.5   |  27.0  |  1.99    Ca    9.1      25 Jun 2021 07:34  Mg     1.9     06-25    TPro  7.4  /  Alb  4.2  /  TBili  1.6  /  DBili  x   /  AST  19  /  ALT  18  /  AlkPhos  103  06-24      Lipase, Serum: 32 U/L (06-24-21 @ 06:43)    eGFR if Non African American: 35 mL/min/1.73M2 (06-25-21 @ 07:34)  eGFR if : 41 mL/min/1.73M2 (06-25-21 @ 07:34)    PT/INR - ( 24 Jun 2021 06:43 )   PT: 18.2 sec;   INR: 1.60 ratio         PTT - ( 24 Jun 2021 06:43 )  PTT:40.7 sec      CARDIAC MARKERS ( 24 Jun 2021 19:51 )  x     / <0.01 ng/mL / x     / x     / x      CARDIAC MARKERS ( 24 Jun 2021 06:43 )  x     / <0.01 ng/mL / x     / x     / x            TSH 1.68   TSH with FT4 reflex --  Total T3 --                        RADIOLOGY & ADDITIONAL TESTS:

## 2021-06-26 DIAGNOSIS — N18.6 END STAGE RENAL DISEASE: ICD-10-CM

## 2021-06-26 DIAGNOSIS — Z86.79 PERSONAL HISTORY OF OTHER DISEASES OF THE CIRCULATORY SYSTEM: ICD-10-CM

## 2021-06-26 LAB
ALBUMIN SERPL ELPH-MCNC: 4.3 G/DL — SIGNIFICANT CHANGE UP (ref 3.3–5.2)
ANION GAP SERPL CALC-SCNC: 11 MMOL/L — SIGNIFICANT CHANGE UP (ref 5–17)
BUN SERPL-MCNC: 14.8 MG/DL — SIGNIFICANT CHANGE UP (ref 8–20)
CALCIUM SERPL-MCNC: 9.1 MG/DL — SIGNIFICANT CHANGE UP (ref 8.6–10.2)
CHLORIDE SERPL-SCNC: 100 MMOL/L — SIGNIFICANT CHANGE UP (ref 98–107)
CO2 SERPL-SCNC: 29 MMOL/L — SIGNIFICANT CHANGE UP (ref 22–29)
CREAT SERPL-MCNC: 0.83 MG/DL — SIGNIFICANT CHANGE UP (ref 0.5–1.3)
GLUCOSE SERPL-MCNC: 83 MG/DL — SIGNIFICANT CHANGE UP (ref 70–99)
HCT VFR BLD CALC: 30.1 % — LOW (ref 39–50)
HGB BLD-MCNC: 10 G/DL — LOW (ref 13–17)
MCHC RBC-ENTMCNC: 30.5 PG — SIGNIFICANT CHANGE UP (ref 27–34)
MCHC RBC-ENTMCNC: 33.2 GM/DL — SIGNIFICANT CHANGE UP (ref 32–36)
MCV RBC AUTO: 91.8 FL — SIGNIFICANT CHANGE UP (ref 80–100)
NT-PROBNP SERPL-SCNC: 1395 PG/ML — HIGH (ref 0–300)
PHOSPHATE SERPL-MCNC: 1.4 MG/DL — LOW (ref 2.4–4.7)
PLATELET # BLD AUTO: 157 K/UL — SIGNIFICANT CHANGE UP (ref 150–400)
POTASSIUM SERPL-MCNC: 3.5 MMOL/L — SIGNIFICANT CHANGE UP (ref 3.5–5.3)
POTASSIUM SERPL-SCNC: 3.5 MMOL/L — SIGNIFICANT CHANGE UP (ref 3.5–5.3)
RBC # BLD: 3.28 M/UL — LOW (ref 4.2–5.8)
RBC # FLD: 13.2 % — SIGNIFICANT CHANGE UP (ref 10.3–14.5)
SODIUM SERPL-SCNC: 140 MMOL/L — SIGNIFICANT CHANGE UP (ref 135–145)
WBC # BLD: 6.82 K/UL — SIGNIFICANT CHANGE UP (ref 3.8–10.5)
WBC # FLD AUTO: 6.82 K/UL — SIGNIFICANT CHANGE UP (ref 3.8–10.5)

## 2021-06-26 PROCEDURE — 99222 1ST HOSP IP/OBS MODERATE 55: CPT

## 2021-06-26 PROCEDURE — 99233 SBSQ HOSP IP/OBS HIGH 50: CPT

## 2021-06-26 PROCEDURE — 99232 SBSQ HOSP IP/OBS MODERATE 35: CPT

## 2021-06-26 PROCEDURE — 90937 HEMODIALYSIS REPEATED EVAL: CPT

## 2021-06-26 RX ORDER — LEVETIRACETAM 250 MG/1
750 TABLET, FILM COATED ORAL
Refills: 0 | Status: DISCONTINUED | OUTPATIENT
Start: 2021-06-26 | End: 2021-06-29

## 2021-06-26 RX ORDER — APIXABAN 2.5 MG/1
5 TABLET, FILM COATED ORAL
Refills: 0 | Status: DISCONTINUED | OUTPATIENT
Start: 2021-06-26 | End: 2021-06-28

## 2021-06-26 RX ORDER — FUROSEMIDE 40 MG
40 TABLET ORAL EVERY 12 HOURS
Refills: 0 | Status: DISCONTINUED | OUTPATIENT
Start: 2021-06-26 | End: 2021-06-28

## 2021-06-26 RX ORDER — LOSARTAN POTASSIUM 100 MG/1
25 TABLET, FILM COATED ORAL DAILY
Refills: 0 | Status: DISCONTINUED | OUTPATIENT
Start: 2021-06-26 | End: 2021-06-29

## 2021-06-26 RX ADMIN — APIXABAN 5 MILLIGRAM(S): 2.5 TABLET, FILM COATED ORAL at 12:40

## 2021-06-26 RX ADMIN — Medication 5 MILLIGRAM(S): at 12:40

## 2021-06-26 RX ADMIN — Medication 500 MILLIGRAM(S): at 12:39

## 2021-06-26 RX ADMIN — LOSARTAN POTASSIUM 25 MILLIGRAM(S): 100 TABLET, FILM COATED ORAL at 17:08

## 2021-06-26 RX ADMIN — GABAPENTIN 100 MILLIGRAM(S): 400 CAPSULE ORAL at 23:04

## 2021-06-26 RX ADMIN — ATORVASTATIN CALCIUM 40 MILLIGRAM(S): 80 TABLET, FILM COATED ORAL at 23:04

## 2021-06-26 RX ADMIN — Medication 25 MILLIGRAM(S): at 23:04

## 2021-06-26 RX ADMIN — Medication 1 TABLET(S): at 12:40

## 2021-06-26 RX ADMIN — Medication 40 MILLIGRAM(S): at 17:08

## 2021-06-26 RX ADMIN — Medication 25 MILLIGRAM(S): at 05:22

## 2021-06-26 RX ADMIN — APIXABAN 5 MILLIGRAM(S): 2.5 TABLET, FILM COATED ORAL at 23:04

## 2021-06-26 RX ADMIN — Medication 25 MILLIGRAM(S): at 15:46

## 2021-06-26 NOTE — CHART NOTE - NSCHARTNOTEFT_GEN_A_CORE
Called by Radiologist of verbal critical finding on CTA chest    Dissection is unchanged from previous CTA, no acute findings   Called CT Surgery PA and relayed the findings

## 2021-06-26 NOTE — CHART NOTE - NSCHARTNOTEFT_GEN_A_CORE
RN called to report patient not receiving his Keppra 750mg 2 times a day hx of seizure disorder,  chart reviewed and Keppra ordered.

## 2021-06-26 NOTE — CHART NOTE - NSCHARTNOTEFT_GEN_A_CORE
CTS PA Addendum:    Discussed case with Dr Fisher by phone and Dr Lutz in AM rounds. Reviewed CT scans and reports. Type B aortic dissection appears stable since Nov 2020. 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 No contrast extravasation or hematoma. Patient had been maintained on Eliquis prior to admission.     - Recommend resuming Eliquis and proceeding with cardioversion with EP Cardio  - Discharge when stable per cardio and medicine  - No further surgical interventions required  - Will sign off at this time  - Please recall with any issues

## 2021-06-26 NOTE — PROGRESS NOTE ADULT - SUBJECTIVE AND OBJECTIVE BOX
Pt seen and examined at . No new complaints. He felt "heart fluttering last night after CT scans". Denies cp or palpitation at this time.  CTSX f/u appreciated Type B aortic dissection appears stable since Nov 2020. 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 No contrast extravasation or hematoma.   DCCV canceled yesterday due to abnormal MUSTAPHA with concerning for aortic root dissection. Eliquis was held.     TELE: AF 90-100bpm    MEDICATIONS  (STANDING):  apixaban 5 milliGRAM(s) Oral <User Schedule>  ascorbic acid 500 milliGRAM(s) Oral daily  atorvastatin 40 milliGRAM(s) Oral at bedtime  furosemide   Injectable 40 milliGRAM(s) IV Push every 12 hours  gabapentin 100 milliGRAM(s) Oral at bedtime  metoprolol tartrate 25 milliGRAM(s) Oral every 8 hours  multivitamin 1 Tablet(s) Oral daily    Vital Signs Last 24 Hrs  T(C): 36.8 (26 Jun 2021 08:14), Max: 36.8 (26 Jun 2021 08:14)  T(F): 98.2 (26 Jun 2021 08:14), Max: 98.2 (26 Jun 2021 08:14)  HR: 81 (26 Jun 2021 08:14) (81 - 111)  BP: 145/85 (26 Jun 2021 08:14) (124/74 - 145/85)  RR: 18 (26 Jun 2021 08:14) (18 - 18)  SpO2: 98% (26 Jun 2021 08:14) (95% - 98%)    Physical Exam:  Constitutional: NAD, AAOx3  Cardiovascular: +S1S2 irregularly irregular   Pulmonary: CTA b/l, unlabored  GI: soft NTND +BS  Extremities: +LUE fistula +thrill +bruit, no pedal edema, +distal pulses b/l  Neuro: non focal, CORDOVA x4    LABS:                        9.4    6.16  )-----------( 149      ( 25 Jun 2021 07:34 )             28.9     06-25    142  |  104  |  33.4<H>  ----------------------------<  90  3.5   |  27.0  |  1.99<H>    Ca    9.1      25 Jun 2021 07:34  Mg     1.9     06-25      RADIOLOGY & ADDITIONAL TESTS:  MUSTAPHA 6/25/21: EF 40-45%, negative for BLAINE thrombus, mod reduced RV fx, mitral valve prosthesis with moderate para-valvular leak, well seated aortic valve prosthesis. Acceleration time is 90 msec, suggesting normal function of aortic bioprosthesis, s/p aortic root repair with type A dissection in 2010. Synthetic graft is seen in the ascending aorta. There is a dissection flap seen in the arch, at least involving the left brachiocephalic artery. There is a small true lumen and a larger false lumen which appears mostly thrombosed. There is connection between the true and false lumens with the probe is about 45 cm from the incisors.    A/P: 62 y/o male with a PMHx of psoriasis, Cocaine abuse (quit 2010), Aortic dissection s/p emergent repair (2010), surgery complicated by CVA w/ residual left sided weakness and bowel ischemia s/p bowel resection and ostomy with reversal repair, Covid infection, ESRD on HD (Tu/Thur/Sat) s/p LUE AVF, HTN, seizure disorder, GIB s/p colonoscopy and AVMs cauterization 3/2021 and CAD s/p remote stent to LAD and recent CABG x 2 (DGV-OM, RPDA with Dr. Butler 11/2020), AI and MR s/p tAVR, tMVR (11/2020 Dr. Butler) with post-op pAfib/flutter (on eliquis), HFrEF (EF 45-50% 02/21), who presented to the ER with complaints of shortness of breath and palpitations x 3 days found to be fluid overloaded with symptomatic atrial flutter w/ RVR. EP following for atrial arrhythmia management. Plan was MUSTAPHA/DCCV yesterday 6/26/21. MUSTAPHA performed with  incidental finding of a possible ascending aortic root aneurysm in the setting of a prior Type A dissection repair in 2010.  Pt with a known chronic type B dissection noted on preop CT scan (11/2020), however no mention of issues in the ascending aorta. DCCV was canceled and CTSx consult requested.    1. ascending aortic root aneurysm   - CTSx follow up appreciated, they reviewed CT scans and reports. Type B aortic dissection appears stable since Nov 2020. 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 No contrast extravasation or hematoma. Patient had been maintained on Eliquis prior to admission.   - Recommend resuming Eliquis and proceeding with cardioversion with EP Cardio  - Discharge when stable per cardio and medicine  - No further surgical interventions required    2. atrial fibrillation  - resume Eliquis 5 mg po BID, first dose stat  - npo after midnight Sunday night for DCCV Monday 6/28/21. Will review need for repeat MUSTAPHA with Dr Virk. Pt missed one dose of Eliquis 6/25/21.  - continue metoprolol for rate control     3. HFrEF  - EF previously 45-50% 2/21 now 40-45%  - continue metoprolol  - pt may benefit from addition of ACEI/ARB >ESRD on HD, BP will tolerate. Changes per primary cardiology team  - possibly due to current atrial arrhythmia, reassess EF when in sinus rhythm     final recommendations per Dr Trevino                       Pt seen and examined at . No new complaints. He felt "heart fluttering last night after CT scans". Denies cp or palpitation at this time.  CTSX f/u appreciated Type B aortic dissection appears stable since Nov 2020. 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 No contrast extravasation or hematoma.   DCCV canceled yesterday due to abnormal MUSTAPHA with concerning for aortic root dissection. Eliquis was held.     TELE: AF 90-100bpm    MEDICATIONS  (STANDING):  apixaban 5 milliGRAM(s) Oral <User Schedule>  ascorbic acid 500 milliGRAM(s) Oral daily  atorvastatin 40 milliGRAM(s) Oral at bedtime  furosemide   Injectable 40 milliGRAM(s) IV Push every 12 hours  gabapentin 100 milliGRAM(s) Oral at bedtime  metoprolol tartrate 25 milliGRAM(s) Oral every 8 hours  multivitamin 1 Tablet(s) Oral daily    Vital Signs Last 24 Hrs  T(C): 36.8 (26 Jun 2021 08:14), Max: 36.8 (26 Jun 2021 08:14)  T(F): 98.2 (26 Jun 2021 08:14), Max: 98.2 (26 Jun 2021 08:14)  HR: 81 (26 Jun 2021 08:14) (81 - 111)  BP: 145/85 (26 Jun 2021 08:14) (124/74 - 145/85)  RR: 18 (26 Jun 2021 08:14) (18 - 18)  SpO2: 98% (26 Jun 2021 08:14) (95% - 98%)    Physical Exam:  Constitutional: NAD, AAOx3  Cardiovascular: +S1S2 irregularly irregular   Pulmonary: CTA b/l, unlabored  GI: soft NTND +BS  Extremities: +LUE fistula +thrill +bruit, no pedal edema, +distal pulses b/l  Neuro: non focal, CORDOVA x4    LABS:                        9.4    6.16  )-----------( 149      ( 25 Jun 2021 07:34 )             28.9     06-25    142  |  104  |  33.4<H>  ----------------------------<  90  3.5   |  27.0  |  1.99<H>    Ca    9.1      25 Jun 2021 07:34  Mg     1.9     06-25      RADIOLOGY & ADDITIONAL TESTS:  MUSTAPHA 6/25/21: EF 40-45%, negative for BLAINE thrombus, mod reduced RV fx, mitral valve prosthesis with moderate para-valvular leak, well seated aortic valve prosthesis. Acceleration time is 90 msec, suggesting normal function of aortic bioprosthesis, s/p aortic root repair with type A dissection in 2010. Synthetic graft is seen in the ascending aorta. There is a dissection flap seen in the arch, at least involving the left brachiocephalic artery. There is a small true lumen and a larger false lumen which appears mostly thrombosed. There is connection between the true and false lumens with the probe is about 45 cm from the incisors.    A/P: 60 y/o male with a PMHx of psoriasis, Cocaine abuse (quit 2010), Aortic dissection s/p emergent repair (2010), surgery complicated by CVA w/ residual left sided weakness and bowel ischemia s/p bowel resection and ostomy with reversal repair, Covid infection, ESRD on HD (Tu/Thur/Sat) s/p LUE AVF, HTN, seizure disorder, GIB s/p colonoscopy and AVMs cauterization 3/2021 and CAD s/p remote stent to LAD and recent CABG x 2 (DGV-OM, RPDA with Dr. Butler 11/2020), AI and MR s/p tAVR, tMVR (11/2020 Dr. Butler) with post-op pAfib/flutter (on eliquis), HFrEF (EF 45-50% 02/21), who presented to the ER with complaints of shortness of breath and palpitations x 3 days found to be fluid overloaded with symptomatic atrial flutter w/ RVR. EP following for atrial arrhythmia management. Plan was MUSTAPHA/DCCV yesterday 6/26/21. MUSTAPHA performed with  incidental finding of a possible ascending aortic root aneurysm in the setting of a prior Type A dissection repair in 2010.  Pt with a known chronic type B dissection noted on preop CT scan (11/2020), however no mention of issues in the ascending aorta. DCCV was canceled and CTSx consult requested.    1. ascending aortic root aneurysm   - CTSx follow up appreciated, they reviewed CT scans and reports. Type B aortic dissection appears stable since Nov 2020. 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 No contrast extravasation or hematoma. Patient had been maintained on Eliquis prior to admission.   - Recommend resuming Eliquis and proceeding with cardioversion with EP Cardio  - Discharge when stable per cardio and medicine  - No further surgical interventions required    2. atrial fibrillation  - resume Eliquis 5 mg po BID, first dose stat  - npo after midnight Sunday night for DCCV Monday 6/28/21. Will review need for repeat MUSTAPHA with Dr Virk. Pt missed one dose of Eliquis 6/25/21.  - continue metoprolol for rate control     3. HFrEF  - EF previously 45-50% 2/21 now 40-45%  - continue metoprolol  - pt may benefit from addition of ACEI/ARB >ESRD on HD, BP will tolerate. Changes per primary cardiology team  - possibly due to current atrial arrhythmia, reassess EF when in sinus rhythm     4. ESRD on HD  - HD per renal team     final recommendations per Dr Trevino

## 2021-06-26 NOTE — CONSULT NOTE ADULT - PROBLEM SELECTOR RECOMMENDATION 9
Patient with extensive cardiac history as above, now with concern for dissection/pseudoaneurysm incidentally seen on MUSTAPHA.  Patient remains hemodynamically stable at this time.   Continue BP management.   CTA C/A/P dissection protocol ordered as STAT. Pending full radiological reading and comparison to CT angio from 11/2020.   Case and plan discussed with Cardiac Surgeon Dr. Fisher.   Further plan as per primary team. Patient with extensive cardiac history as above, now with concern for dissection/aneurysm incidentally seen on MUSTAPHA.  Patient remains hemodynamically stable at this time.   Continue BP management.   CTA C/A/P dissection protocol ordered as STAT. Pending full radiological reading and comparison to CT angio from 11/2020.   Case and plan discussed with Cardiac Surgeon Dr. Fisher.   Further plan as per primary team.

## 2021-06-26 NOTE — PROGRESS NOTE ADULT - SUBJECTIVE AND OBJECTIVE BOX
seen for afib, aortic aneurysm    no acute complaints  tolerated hd well   ros negative     MEDICATIONS  (STANDING):  apixaban 5 milliGRAM(s) Oral <User Schedule>  ascorbic acid 500 milliGRAM(s) Oral daily  atorvastatin 40 milliGRAM(s) Oral at bedtime  furosemide   Injectable 40 milliGRAM(s) IV Push every 12 hours  gabapentin 100 milliGRAM(s) Oral at bedtime  losartan 25 milliGRAM(s) Oral daily  metoprolol tartrate 25 milliGRAM(s) Oral every 8 hours  multivitamin 1 Tablet(s) Oral daily    MEDICATIONS  (PRN):  metoprolol tartrate Injectable 5 milliGRAM(s) IV Push every 6 hours PRN HR >120      Allergies    penicillin (Other)    Intolerances      Vital Signs Last 24 Hrs  T(C): 37.1 (26 Jun 2021 13:12), Max: 37.1 (26 Jun 2021 13:12)  T(F): 98.7 (26 Jun 2021 13:12), Max: 98.7 (26 Jun 2021 13:12)  HR: 100 (26 Jun 2021 13:12) (81 - 111)  BP: 130/68 (26 Jun 2021 13:12) (124/74 - 145/85)  BP(mean): --  RR: 18 (26 Jun 2021 13:12) (18 - 18)  SpO2: 100% (26 Jun 2021 13:12) (95% - 100%)    PHYSICAL EXAM:    GENERAL: NAD  CHEST/LUNG: Clear to percussion bilaterally  HEART: reg rate and rhythm; S1 S2  ABDOMEN: Soft, Nontender, Nondistended; Bowel sounds present  EXTREMITIES:  no edema   NERVOUS SYSTEM:  Alert & Oriented X3, Motor Strength 5/5 B/L upper and lower extremities  PSYCH: normal mood, appropriate response.    LABS:                        10.0   6.82  )-----------( 157      ( 26 Jun 2021 14:13 )             30.1     06-26    140  |  100  |  14.8  ----------------------------<  83  3.5   |  29.0  |  0.83    Ca    9.1      26 Jun 2021 14:13  Phos  1.4     06-26  Mg     1.9     06-25    TPro  x   /  Alb  4.3  /  TBili  x   /  DBili  x   /  AST  x   /  ALT  x   /  AlkPhos  x   06-26          CAPILLARY BLOOD GLUCOSE            RADIOLOGY & ADDITIONAL TESTS:

## 2021-06-26 NOTE — PROGRESS NOTE ADULT - SUBJECTIVE AND OBJECTIVE BOX
Chief Complaint:  ESRD    24 hour events/subjective:  Seen/examined    PAST HISTORY  --------------------------------------------------------------------------------  No significant changes to PMH, PSH, FHx, SHx, unless otherwise noted    ALLERGIES & MEDICATIONS  --------------------------------------------------------------------------------  Allergies    penicillin (Other)    Intolerances: None,     Standing Inpatient Medications  apixaban 5 milliGRAM(s) Oral two times a day  metoprolol tartrate 25 milliGRAM(s) Oral every 8 hours    PRN Inpatient Medications  metoprolol tartrate Injectable 5 milliGRAM(s) IV Push every 6 hours PRN    REVIEW OF SYSTEMS  --------------------------------------------------------------------------------  Gen: No weight changes, fatigue, fevers/chills, weakness  Skin: No rashes  Head/Eyes/Ears/Mouth: No headache; Normal hearing; Normal vision w/o blurriness; No sinus pain/discomfort, sore throat  Respiratory: No dyspnea, cough, wheezing, hemoptysis  CV: No chest pain, PND, orthopnea  GI: No abdominal pain, diarrhea, constipation, nausea, vomiting, melena, hematochezia  : No increased frequency, dysuria, hematuria, nocturia  MSK: No joint pain/swelling; no back pain; no edema  Neuro: No dizziness/lightheadedness, weakness, seizures, numbness, tingling  Heme: No easy bruising or bleeding  Endo: No heat/cold intolerance  Psych: No significant nervousness, anxiety, stress, depression    All other systems were reviewed and are negative, except as noted.    VITALS/PHYSICAL EXAM    ICU Vital Signs Last 48 Hrs    T(C): 36.8 (2021 08:14), Max: 36.8 (2021 08:14)  T(F): 98.2 (2021 08:14), Max: 98.2 (2021 08:14)  HR: 81 (:14) (81 - 111)  BP: 145/85 (2021 08:14) (124/74 - 145/85)  RR: 18 (2021 08:14) (18 - 18)  SpO2: 98% (2021 08:14) (95% - 98%)    --------------------------------------------------------------------------------  T(C): 37 (21 @ 07:58), Max: 37.2 (21 @ 16:45)  HR: 88 (21 @ 12:41) (88 - 106)  BP: 133/70 (21 @ 12:41) (107/61 - 146/84)  RR: 18 (21 @ 12:41) (18 - 18)  SpO2: 97% (21 @ 12:41) (94% - 100%)  Height (cm): 175.3 (21 @ 05:26)  Weight (kg): 76 (21 @ 05:26)  BMI (kg/m2): 24.7 (21 05:26)  BSA (m2): 1.92 (21 @ 05:26)    21 @ 07:01  -  21 @ 07:00  --------------------------------------------------------  IN: 0 mL / OUT: 0 mL / NET: 0 mL    Physical Exam:  	Gen: NAD,   	HEENT: PERRL, supple neck, clear oropharynx  	Pulm: CTA B/L  	CV: RRR, S1S2; no rub  	Back: No spinal or CVA tenderness; no sacral edema  	Abd: +BS, soft, nontender/nondistended  	: No suprapubic tenderness  	UE: Warm, FROM, no clubbing, intact strength; no edema; no asterixis  	LE: Warm, FROM, no clubbing, intact strength; no edema  	Neuro: No focal deficits, intact gait  	Psych: Normal affect and mood  	Skin: Warm, without rashes  	Vascular access:    LABS/STUDIES:    142    |  104    |  33.4<H>  ----------------------------<  90     Ca:9.1   (2021 07:34)  3.5     |  27.0   |  1.99<H>    TPro  7.4    /  Alb  4.2    /  TBili  1.6    /  DBili  x      /  AST  19     /  ALT  18     /  AlkPhos  103    2021 06:43                        9.4<L>  6.16  )-----------( 149<L>    ( 2021 07:34 )             28.9<L>    M.9 mg/dL     B12:1.68 uIU/mL TSH:-- ( @ 07:34)  -------------------------------------------------------------------------------              9.4    6.16  >-----------<  149      [21 @ 07:34]              28.9     142  |  104  |  33.4  ----------------------------<  90      [06-25-21 @ 07:34]  3.5   |  27.0  |  1.99        Ca     9.1     [21 @ 07:34]      Mg     1.9     [21 @ 07:34]    TPro  7.4  /  Alb  4.2  /  TBili  1.6  /  DBili  x   /  AST  19  /  ALT  18  /  AlkPhos  103  [21 @ 06:43]    PT/INR: PT 18.2 , INR 1.60       [21 @ 06:43]  PTT: 40.7       [21 @ 06:43]    Troponin <0.01      [21 @ 19:51]    Creatinine Trend:  SCr 1.99 [:34]  SCr 2.00 [:43]    Iron 29, TIBC 227, %sat 13      [20 @ 08:05]  Ferritin 1099      [20 @ 08:05]  TSH 1.68      [21 @ 07:34]  Lipid: chol --, , HDL --, LDL --      [11-15-20 @ 02:40]    HBsAb <3.0      [21 @ 02:07]  HBsAg Nonreact      [21 @ 02:07]    1) ESRD on HD  2) Acute decompensated HFrEF  3) Afib with RVR  4) Anemia of CKD    HD in Progress,   Hb low; REINALDO with HD    Stable Aortic dissection - No Change, since ,

## 2021-06-26 NOTE — PROGRESS NOTE ADULT - SUBJECTIVE AND OBJECTIVE BOX
[facility]                                                               Office: [address] [address]                                                              Telephone: [phone]. Fax:[phone]                                                                             PROGRESS NOTE  Reason for follow up: Afib, CHF  Update: Concern for dissection on MUSTAPHA yesterday. CTA performed, unchanged from 11/2020, imaging reviewed by Dr. Fisher and Dr. Lutz, stable, no extravasation or hematoma. Pt cleared to be restarted on  AC, and no surgical interventions planned at this time. Patient just underwent HD, and states that his breathing feels much better. Still with uncontrolled rates of Afib, plan for MUSTAPHA/DCCV on Monday.     Covid Status: Negative 06/24/21      Review of symptoms:   Cardiac:  No chest pain. No dyspnea. No palpitations.  Respiratory: No cough. No dyspnea  Gastrointestinal: No diarrhea. No abdominal pain. No bleeding.     Past medical history: No updates.   	  Vitals:  T(C): 37.1 (06-26-21 @ 13:12), Max: 37.1 (06-26-21 @ 13:12)  HR: 100 (06-26-21 @ 13:12) (81 - 111)  BP: 130/68 (06-26-21 @ 13:12) (124/74 - 145/85)  RR: 18 (06-26-21 @ 13:12) (18 - 18)  SpO2: 100% (06-26-21 @ 13:12) (95% - 100%)  Wt(kg): --  I&O's Summary    26 Jun 2021 07:01  -  26 Jun 2021 14:25  --------------------------------------------------------  IN: 0 mL / OUT: 0 mL / NET: 0 mL      Weight (kg): 76 (06-24 @ 05:26)      PHYSICAL EXAM:  Appearance: Comfortable. No acute distress  HEENT:  Head and neck: Atraumatic. Normocephalic.  Normal oral mucosa, PERRL, Neck is supple. No JVD, No carotid bruit.   Neurologic: A&Ox 3, no focal deficits. EOMI, Cranial nerves are intact.  Lymphatic: No cervical lymphadenopathy  Cardiovascular: Tachycardic, irregularly irregular,, No murmur, rubs/gallops. No JVD, No edema  Respiratory: Lungs clear to auscultation  Gastrointestinal:  Soft, Non-tender, + BS  Lower Extremities: No edema  Psychiatry: Patient is calm. No agitation. Mood & affect appropriate  Skin: No rashes/ecchymoses/cyanosis/ulcers visualized on the face, hands or feet.      CURRENT MEDICATIONS:  furosemide   Injectable 40 milliGRAM(s) IV Push every 12 hours  losartan 25 milliGRAM(s) Oral daily  metoprolol tartrate 25 milliGRAM(s) Oral every 8 hours  metoprolol tartrate Injectable 5 milliGRAM(s) IV Push every 6 hours PRN    gabapentin  atorvastatin  apixaban  ascorbic acid  multivitamin      DIAGNOSTIC TESTING:  [ ] Echocardiogram:   < from: MUSTAPHA Echo Doppler (06.25.21 @ 17:45) >  PHYSICIAN INTERPRETATION:  Left Ventricle:  Global LV systolic function was mildly decreased. Left ventricular ejection fraction, by visual estimation, is 40 to 45%. Abnormal (paradoxical) septal motion consistent with post-operative status. The mitral in-flow pattern reveals no discernable A-wave, therefore no comment on diastolic function can be made.  Right Ventricle: The right ventricular size is normal. RV systolic function is moderately reduced.  Left Atrium: Left atrial enlargement. No left atrial appendage thrombus is seen.  Right Atrium: Normal right atrial size.  Pericardium: Trivial pericardial effusion is present.  Mitral Valve: There is a mitral valve prosthesis with moderate para-valvular leak.  Tricuspid Valve: The tricuspid valve is normal in structure. Mild tricuspid regurgitation is visualized.  Aortic Valve: There is a well seated aortic valve prosthesis. Acceleration time is 90 msec, suggesting normal function of aortic bioprosthesis.  Aorta: Patient is s/p aortic root repair with type A dissection in 2010. Synthetic graft is seen in the ascending aorta. There is a dissection flap seen in the arch, at least involving the left brachiocephalic artery. There is a small true lumen and a largerfalse lumen which appears mostly thrombosed. There is connection between the true and false lumens with the probe is about 45 cm from the incisors.      Summary:   1. Technically good study.   2. Mildly decreased global left ventricular systolic function.   3. Left ventricular ejection fraction, by visual estimation, is 40 to 45%.   4. Left atrial enlargement.   5. Abnormal septal motion consistent with post-operative status.   6. The mitral in-flow pattern reveals no discernable A-wave, therefore no comment on diastolic function can be made.   7. Moderately reduced RV systolic function.   8. Normal right atrial size.   9. There is a mitral valve prosthesis with moderate para-valvular leak.  10. Mild tricuspid regurgitation.  11. There is a well seated aortic valve prosthesis. Acceleration time is 90 msec, suggesting normal function of aortic bioprosthesis.  12. Patient is s/p aortic root repair with type A dissection in 2010. Synthetic graft is seen in the ascending aorta. There is a dissection flap seen in the arch, at least involving the left brachiocephalic artery. There is a small true lumen and a larger false lumen which appears mostly thrombosed. There is connection between the true and false lumens with the probe is about 45 cm from the incisors.  13. Trivial pericardial effusion.  14. Images were reviewed with Dr. Virk.    MD Gennaro Electronically signed on 6/25/2021 at 6:49:51 PM      < end of copied text >    [ ]  Catheterization:  [ ] Stress Test:    OTHER: 	      LABS:	 	  CARDIAC MARKERS ( 24 Jun 2021 19:51 )  x     / <0.01 ng/mL / x     / x     / x      p-BNP 24 Jun 2021 19:51: x    , CARDIAC MARKERS ( 24 Jun 2021 06:43 )  x     / <0.01 ng/mL / x     / x     / x      p-BNP 24 Jun 2021 06:43: 3677 pg/mL                          10.0   6.82  )-----------( 157      ( 26 Jun 2021 14:13 )             30.1     06-25    142  |  104  |  33.4<H>  ----------------------------<  90  3.5   |  27.0  |  1.99<H>    Ca    9.1      25 Jun 2021 07:34  Mg     1.9     06-25      proBNP: Serum Pro-Brain Natriuretic Peptide: 3677 pg/mL (06-24 @ 06:43)    Lipid Profile:   HgA1c:   TSH: Thyroid Stimulating Hormone, Serum: 1.68 uIU/mL        TELEMETRY: Reviewed  Afib 90-110s

## 2021-06-26 NOTE — CONSULT NOTE ADULT - ASSESSMENT
61 year old male patient with a medical history of Cocaine abuse (quit 2010), CAD s/p remote stent to LAD and recent CABG x 2 (DGV-OM, RPDA with Dr. Butler 11/2020), AI and MR s/p tAVR, tMVR (11/2020 Dr. Butler) with post-op Afib/flutter(on eliquis), HFrEF (EF 45-50% 02/21), Aortic dissection s/p emergent repair (2010) and residual type B dissection, surgery complicated by CVA w/ residual left sided weakness and bowel ischemia s/p bowel resection and ostomy with reversal repair, Covid infection, ESRD on HD (Tu/Thur/Sat) s/p LUE AVF, HTN, seizure disorder, GIB s/p colonoscopy and AVMs cauterization 3/2021, and psoriasis who presented to the ER with complaints of shortness of breath and palpitations x 3 days found to be fluid overloaded with symptomatic atrial flutter w/ RVR. 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

## 2021-06-26 NOTE — CONSULT NOTE ADULT - REASON FOR ADMISSION
sob and palpitations x 3 days

## 2021-06-26 NOTE — CONSULT NOTE ADULT - SUBJECTIVE AND OBJECTIVE BOX
Consult for surgeon:  Dr. Lutz    HPI:  61 year old male patient with a medical history of Cocaine abuse (quit 2010), CAD s/p remote stent to LAD and recent CABG x 2 (DGV-OM, RPDA with Dr. Butler 11/2020), AI and MR s/p tAVR, tMVR (11/2020 Dr. Butler) with post-op Afib/flutter(on eliquis), HFrEF (EF 45-50% 02/21), Aortic dissection s/p emergent repair (2010) and residual type B dissection, surgery complicated by CVA w/ residual left sided weakness and bowel ischemia s/p bowel resection and ostomy with reversal repair, Covid infection, ESRD on HD (Tu/Thur/Sat) s/p LUE AVF, HTN, seizure disorder, GIB s/p colonoscopy and AVMs cauterization 3/2021, and psoriasis who presented to the ER with complaints of shortness of breath and palpitations x 3 days found to be fluid overloaded with symptomatic atrial flutter w/ RVR. 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  Pt with a known chronic type B dissection noted on preop CT scan (11/2020), however no mention of issues in the ascending aorta. Findings d/w Dr Harry and CTS to evaluate patient. Pt is hemodynamically stable.      Review of systems:  GENERAL:  Fevers[] chills[] sweats[] fatigue[] weight loss[] weight gain []                                        NEURO:  paraesthesias[] seizures []  syncope []  confusion []                                                                                  EYES: changes in vision[]  discharge[] pain[]                                                                            ENMT:  change in hearing []  vertigo[]  dysphagia[] epistaxis[]                                  CV:  chest pain[] palpitations[] ZUNIGA [] diaphoresis [] edema[]                                                                                             RESPIRATORY:  wheezing[] SOB[] cough [] sputum[] hemoptysis[]                                                                    GI:  nausea[]  vomiting []  diarrhea[] constipation [] melena []                                                                        : hematuria[]  dysuria[] urgency[] incontinence[]                                                                                              MUSCULOSKELETAL:  arthritis[]  joint swelling [ ] muscle weakness []   +Claudication/ leg cramping [X}]                                                             SKIN/BREAST:  rash[] itching []  hair loss[] masses[]                                                                                                PSYCH:  dementia [] depression [] anxiety[]                                                                                                                  HEME:  bruises easily[]                                          ENDOCRINE:  cold intolerance[] heat intolerance[] polydipsia[]         PAST MEDICAL & SURGICAL HISTORY:  CHF (congestive heart failure)  CVA (cerebral vascular accident)  Seizures, last seizure 10 years ago  HTN (hypertension)  Hyperlipemia  COVID-19 october 2020  Atrial fibrillation  ESRD on dialysis Tu/Thurs/Sat  Former smoker  History of cocaine use remote hx, currently sober  H/O aortic dissection s/p repair (2010), surgery complicated by bowel ischemia s/p bowel resection and ostomy with reversal  H/O aortic valve insufficiency  Mitral regurgitation  Aorta disorder  H/O colectomy  Status post double vessel coronary artery bypass  S/P AVR (aortic valve replacement)  S/P MVR (mitral valve replacement)    MEDICATIONS  (STANDING):  ascorbic acid 500 milliGRAM(s) Oral daily  atorvastatin 40 milliGRAM(s) Oral at bedtime  gabapentin 100 milliGRAM(s) Oral at bedtime  metoprolol tartrate 25 milliGRAM(s) Oral every 8 hours  multivitamin 1 Tablet(s) Oral daily    MEDICATIONS  (PRN):  metoprolol tartrate Injectable 5 milliGRAM(s) IV Push every 6 hours PRN HR >120    Anti-coagulation:  NOAC- Eliquis   Last Dose:    Allergies: penicillin (Other)    Social History:  Former smoker  Denies any active alcohol or drug use / abuse. Quit cocaine 10 yrs ago with aortic dissection.   Occupation: none  Live with: alone, two brothers nearby. Never .  Assist device use: none, ambulates independently.     PMD: Quinn Yo MD  Referring Cardiologist: Kath    Relevant Family History  FH: hypertension    Vital Signs:  T(F): 97.8 (06-25-21 @ 22:30)  HR: 111 (06-25-21 @ 22:30)  BP: 137/77 (06-25-21 @ 22:30)  RR: 18 (06-25-21 @ 22:30)  SpO2: 97% (06-25-21 @ 22:30)    Physical Exam  Constitutional: Lying in bed in NAD  Neuro: A+O x 3, non-focal, speech clear and intact  HEENT: NC/AT, PERRL, EOMI, anicteric sclerae, oral mucosa pink and moist  Neck: supple, no JVD  CV: irregularly irregular, +S1S2  Pulm/chest: lung sounds CTA and equal bilaterally, no accessory muscle use noted  Abd: soft, NT, ND, +BS  Ext: CORDOVA x 4, no C/C/E. LUE +AVF +thrill. +PP b/l LEs and b/l UEs  Skin: warm, dry, perfused. MSI and LLE incision scars healed appropriately   Psych: calm, appropriate affect    LABS:                        9.4    6.16  )-----------( 149      ( 25 Jun 2021 07:34 )             28.9     06-25    142  |  104  |  33.4<H>  ----------------------------<  90  3.5   |  27.0  |  1.99<H>    Ca    9.1      25 Jun 2021 07:34  Mg     1.9     06-25    TPro  7.4  /  Alb  4.2  /  TBili  1.6  /  DBili  x   /  AST  19  /  ALT  18  /  AlkPhos  103  06-24    PT/INR - ( 24 Jun 2021 06:43 )   PT: 18.2 sec;   INR: 1.60 ratio       PTT - ( 24 Jun 2021 06:43 )  PTT:40.7 sec    CARDIAC MARKERS ( 24 Jun 2021 19:51 )  x     / <0.01 ng/mL / x     / x     / x      CARDIAC MARKERS ( 24 Jun 2021 06:43 )  x     / <0.01 ng/mL / x     / x     / x          I&O's Summary    24 Jun 2021 07:01  -  25 Jun 2021 07:00  --------------------------------------------------------  IN: 0 mL / OUT: 0 mL / NET: 0 mL      Imaging    MUSTAPHA-  < from: MUSTAPHA Echo Doppler (06.25.21 @ 17:45) >  Summary:   1. Technically good study.   2. Mildly decreased global left ventricular systolic function.   3. Left ventricular ejection fraction, by visual estimation, is 40 to 45%.   4. Left atrial enlargement.   5. Abnormal septal motion consistent with post-operative status.   6. The mitral in-flow pattern reveals no discernable A-wave, therefore no comment on diastolic function can be made.   7. Moderately reduced RV systolic function.   8. Normal right atrial size.   9. There is a mitral valve prosthesis with moderate para-valvular leak.  10. Mild tricuspid regurgitation.  11. There is a well seated aortic valve prosthesis. Acceleration time is 90 msec, suggesting normal function of aortic bioprosthesis.  12. Patient is s/p aortic root repair with type A dissection in 2010. Synthetic graft is seen in the ascending aorta. There is a dissection flap seen in the arch, at least involving the left brachiocephalic artery. There is a small true lumen and a larger false lumen which appears mostly thrombosed. There is connection between the true and false lumens with the probe is about 45 cm from the incisors.  13. Trivial pericardial effusion.  14. Images were reviewed with Dr. Virk.  < end of copied text >    ECG-  12 Lead ECG:   Ventricular Rate 125 BPM  Atrial Rate 250 BPM  QRS Duration 96 ms  Q-T Interval 316 ms  QTC Calculation(Bazett) 456 ms  P Axis 52 degrees  R Axis -23 degrees  T Axis 143 degrees  Diagnosis Line *** Poor data quality, interpretation may be adversely affected  Atrial flutter with variable A-V block  Moderate voltage criteria for LVH, may be normal variant  Marked ST abnormality, possible lateral subendocardial injury  Abnormal ECG  Confirmed by FRANCESCO DESIR ([id number]) on 6/24/2021 8:11:06 AM (06-24-21 @ 05:36)    TTE-  < from: TTE Echo Complete w/o Contrast w/ Doppler (11.23.20 @ 13:54) >  Summary:   1. Left ventricular ejection fraction, by visual estimation, is 40%%.   2. Technically adequate study.   3. Moderately decreased global left ventricular systolic function.   4. Multiple left ventricular regional wall motion abnormalities exist. See wall motion findings.   5. The left ventricular diastolic function could not be assessed in this study.   6. Moderately reduced RV systolic function.   7. Trivial pericardial effusion.   8. A bioprosthetic valve is present in the mitral position.   9. Trace mitral valve regurgitation.  10. Moderate tricuspid regurgitation.  11. Bioprosthesis in the aortic position.  12. Estimated pulmonary artery systolic pressure is 40.9 mmHg assuming a right atrial pressure of 8 mmHg, which is consistent with mild pulmonary hypertension.  13. Endocardial visualization was enhanced with intravenous echo contrast.  14. Mitral valve mean gradient is 2.2 mmHg consistent with mild mitral stenosis.  < end of copied text >    Cath-  < from: Cardiac Cath Lab - Adult (11.10.20 @ 07:43) >  VENTRICLES: EF estimated was 50 %.  VALVES: AORTIC VALVE: There was moderate to severe aortic insufficiency.  MITRAL VALVE: The mitral valve exhibited moderate to severe regurgitation.  CORONARY VESSELS: The coronary circulation is right dominant.  LM:   --  LM: The left anterior descending and circumflex arteries arose anomalously from separate ostia.  LAD:   --  LAD: Angiography showed moderate atherosclerosis.  CX:   --  Proximal circumflex: There was a discrete 60 % stenosis.  --  OM1: The vessel was small to medium sized. There was a discrete 80 % stenosis. The lesion was irregularly contoured and eccentric.  --  OM2: The vessel was medium to large sized. There was a discrete 80 % stenosis. The lesion was irregularly contoured, eccentric, and heavily calcified.  RCA:   --  Proximal RCA: There was a 100 % stenosis. This lesion is a chronic total occlusion. The distal vessel fills via collaterals from the LCA.  AORTA: The root exhibited dilatation. Ascending aorta: The segment was normal in size. Graft repair intact. Aortic arch: Normal. Known residual Type B dissection by CTA. Descending aorta: The segment was normal in size. Known residual Type B dissection by CTA.  ARCH VESSELS: Innominate: Known residual Type B dissection by CTA. Proximal left subclavian: Known residual Type B dissection by CTA. LIMA: Normal. TAMMY: Normal.  COMPLICATIONS: No complications occurred during the cath lab visit.  SUMMARY:  Summary: Addendum 11/11/20: Case reconfirmed to remove Bypass Graph Study, duplicate Left Subclavian procedure,change contrast from 1665 to 165 and add Inominate Angiography procedure  DIAGNOSTIC IMPRESSIONS: - Patent ascending aortic graft  - Severe 2 vessel CAD with chronically occluded proximal RCA. There are brisk collaterals from the LCA.  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, subclavian artery, arch, and descending aorta  - Low normal LV systolic function with moderate to severe AI and MR (best seen on echo)  - The IMAs are both patent  - Cardiac output 5.1 LPM; Cardiac index 2.5  - Markedly elevated LVEDP =38mmHg  DIAGNOSTIC RECOMMENDATIONS: - HD later today as per renal, continue with fluid removal  - Continue ASA and statin  - For OHS with Dr. Butler later this week  - [facility] will continue to follow  < end of copied text >    CXR-  < from: Xray Chest 1 View- PORTABLE-Urgent (06.24.21 @ 06:56) >  Mild cardiomegaly and normal pulmonary vasculature with no gross consolidation, effusion, pneumothorax or acute osseous finding.  Mild-to-moderate perihilar interstitial prominence is most consistent with edema/infiltrate.  Patient is post sternotomy/CABG/cardiac valve surgery.  IMPRESSION:  Moderate interstitial edema/infiltrate with no gross consolidation, new  < end of copied text >    Carotid U/S-  < from: US Duplex Carotid Arteries Complete, Bilateral (11.05.20 @ 16:51) >  IMPRESSION:  Moderate plaque in the right carotid bulb with elevated peak systolic and end-diastolic velocities which overall are suggestive of a 50-69% stenosis as described above, however given the peak systolic velocity is suggestive of a greater than 70% stenosis, aCTA of the neck is recommended for further evaluation.  Mild to moderate plaque in the left carotid bulb with less than 50% left internal carotid artery stenosis.  Arrhythmia.  < end of copied text >     Consult for surgeon:  Dr. Fisher    HPI:  61 year old male patient with a medical history of Cocaine abuse (quit 2010), CAD s/p remote stent to LAD and recent CABG x 2 (DGV-OM, RPDA with Dr. Butler 11/2020), AI and MR s/p tAVR, tMVR (11/2020 Dr. Butler) with post-op Afib/flutter(on eliquis), HFrEF (EF 45-50% 02/21), Aortic dissection s/p emergent repair (2010) and residual type B dissection, surgery complicated by CVA w/ residual left sided weakness and bowel ischemia s/p bowel resection and ostomy with reversal repair, Covid infection, ESRD on HD (Tu/Thur/Sat) s/p LUE AVF, HTN, seizure disorder, GIB s/p colonoscopy and AVMs cauterization 3/2021, and psoriasis who presented to the ER with complaints of shortness of breath and palpitations x 3 days found to be fluid overloaded with symptomatic atrial flutter w/ RVR. 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  Pt with a known chronic type B dissection noted on preop CT scan (11/2020), however no mention of issues in the ascending aorta. Findings d/w Dr Harry and CTS to evaluate patient. Pt is hemodynamically stable.      Review of systems:  GENERAL:  Fevers[] chills[] sweats[] fatigue[] weight loss[] weight gain []                                        NEURO:  paraesthesias[] seizures []  syncope []  confusion []                                                                                  EYES: changes in vision[]  discharge[] pain[]                                                                            ENMT:  change in hearing []  vertigo[]  dysphagia[] epistaxis[]                                  CV:  chest pain[] palpitations[] ZUNIGA [] diaphoresis [] edema[]                                                                                             RESPIRATORY:  wheezing[] SOB[] cough [] sputum[] hemoptysis[]                                                                    GI:  nausea[]  vomiting []  diarrhea[] constipation [] melena []                                                                        : hematuria[]  dysuria[] urgency[] incontinence[]                                                                                              MUSCULOSKELETAL:  arthritis[]  joint swelling [ ] muscle weakness []   +Claudication/ leg cramping [X}]                                                             SKIN/BREAST:  rash[] itching []  hair loss[] masses[]                                                                                                PSYCH:  dementia [] depression [] anxiety[]                                                                                                                  HEME:  bruises easily[]                                          ENDOCRINE:  cold intolerance[] heat intolerance[] polydipsia[]         PAST MEDICAL & SURGICAL HISTORY:  CHF (congestive heart failure)  CVA (cerebral vascular accident)  Seizures, last seizure 10 years ago  HTN (hypertension)  Hyperlipemia  COVID-19 october 2020  Atrial fibrillation  ESRD on dialysis Tu/Thurs/Sat  Former smoker  History of cocaine use remote hx, currently sober  H/O aortic dissection s/p repair (2010), surgery complicated by bowel ischemia s/p bowel resection and ostomy with reversal  H/O aortic valve insufficiency  Mitral regurgitation  Aorta disorder  H/O colectomy  Status post double vessel coronary artery bypass  S/P AVR (aortic valve replacement)  S/P MVR (mitral valve replacement)    MEDICATIONS  (STANDING):  ascorbic acid 500 milliGRAM(s) Oral daily  atorvastatin 40 milliGRAM(s) Oral at bedtime  gabapentin 100 milliGRAM(s) Oral at bedtime  metoprolol tartrate 25 milliGRAM(s) Oral every 8 hours  multivitamin 1 Tablet(s) Oral daily    MEDICATIONS  (PRN):  metoprolol tartrate Injectable 5 milliGRAM(s) IV Push every 6 hours PRN HR >120    Anti-coagulation:  NOAC- Eliquis   Last Dose:    Allergies: penicillin (Other)    Social History:  Former smoker  Denies any active alcohol or drug use / abuse. Quit cocaine 10 yrs ago with aortic dissection.   Occupation: none  Live with: alone, two brothers nearby. Never .  Assist device use: none, ambulates independently.     PMD: Quinn Yo MD  Referring Cardiologist: Kath    Relevant Family History  FH: hypertension    Vital Signs:  T(F): 97.8 (06-25-21 @ 22:30)  HR: 111 (06-25-21 @ 22:30)  BP: 137/77 (06-25-21 @ 22:30)  RR: 18 (06-25-21 @ 22:30)  SpO2: 97% (06-25-21 @ 22:30)    Physical Exam  Constitutional: Lying in bed in NAD  Neuro: A+O x 3, non-focal, speech clear and intact  HEENT: NC/AT, PERRL, EOMI, anicteric sclerae, oral mucosa pink and moist  Neck: supple, no JVD  CV: irregularly irregular, +S1S2  Pulm/chest: lung sounds CTA and equal bilaterally, no accessory muscle use noted  Abd: soft, NT, ND, +BS  Ext: CORDOVA x 4, no C/C/E. LUE +AVF +thrill. +PP b/l LEs and b/l UEs  Skin: warm, dry, perfused. MSI and LLE incision scars healed appropriately   Psych: calm, appropriate affect    LABS:                        9.4    6.16  )-----------( 149      ( 25 Jun 2021 07:34 )             28.9     06-25    142  |  104  |  33.4<H>  ----------------------------<  90  3.5   |  27.0  |  1.99<H>    Ca    9.1      25 Jun 2021 07:34  Mg     1.9     06-25    TPro  7.4  /  Alb  4.2  /  TBili  1.6  /  DBili  x   /  AST  19  /  ALT  18  /  AlkPhos  103  06-24    PT/INR - ( 24 Jun 2021 06:43 )   PT: 18.2 sec;   INR: 1.60 ratio       PTT - ( 24 Jun 2021 06:43 )  PTT:40.7 sec    CARDIAC MARKERS ( 24 Jun 2021 19:51 )  x     / <0.01 ng/mL / x     / x     / x      CARDIAC MARKERS ( 24 Jun 2021 06:43 )  x     / <0.01 ng/mL / x     / x     / x          I&O's Summary    24 Jun 2021 07:01  -  25 Jun 2021 07:00  --------------------------------------------------------  IN: 0 mL / OUT: 0 mL / NET: 0 mL      Imaging    MUSTAPHA-  < from: MUSTAPHA Echo Doppler (06.25.21 @ 17:45) >  Summary:   1. Technically good study.   2. Mildly decreased global left ventricular systolic function.   3. Left ventricular ejection fraction, by visual estimation, is 40 to 45%.   4. Left atrial enlargement.   5. Abnormal septal motion consistent with post-operative status.   6. The mitral in-flow pattern reveals no discernable A-wave, therefore no comment on diastolic function can be made.   7. Moderately reduced RV systolic function.   8. Normal right atrial size.   9. There is a mitral valve prosthesis with moderate para-valvular leak.  10. Mild tricuspid regurgitation.  11. There is a well seated aortic valve prosthesis. Acceleration time is 90 msec, suggesting normal function of aortic bioprosthesis.  12. Patient is s/p aortic root repair with type A dissection in 2010. Synthetic graft is seen in the ascending aorta. There is a dissection flap seen in the arch, at least involving the left brachiocephalic artery. There is a small true lumen and a larger false lumen which appears mostly thrombosed. There is connection between the true and false lumens with the probe is about 45 cm from the incisors.  13. Trivial pericardial effusion.  14. Images were reviewed with Dr. Virk.  < end of copied text >    ECG-  12 Lead ECG:   Ventricular Rate 125 BPM  Atrial Rate 250 BPM  QRS Duration 96 ms  Q-T Interval 316 ms  QTC Calculation(Bazett) 456 ms  P Axis 52 degrees  R Axis -23 degrees  T Axis 143 degrees  Diagnosis Line *** Poor data quality, interpretation may be adversely affected  Atrial flutter with variable A-V block  Moderate voltage criteria for LVH, may be normal variant  Marked ST abnormality, possible lateral subendocardial injury  Abnormal ECG  Confirmed by FRANCESCO DESIR ([id number]) on 6/24/2021 8:11:06 AM (06-24-21 @ 05:36)    TTE-  < from: TTE Echo Complete w/o Contrast w/ Doppler (11.23.20 @ 13:54) >  Summary:   1. Left ventricular ejection fraction, by visual estimation, is 40%%.   2. Technically adequate study.   3. Moderately decreased global left ventricular systolic function.   4. Multiple left ventricular regional wall motion abnormalities exist. See wall motion findings.   5. The left ventricular diastolic function could not be assessed in this study.   6. Moderately reduced RV systolic function.   7. Trivial pericardial effusion.   8. A bioprosthetic valve is present in the mitral position.   9. Trace mitral valve regurgitation.  10. Moderate tricuspid regurgitation.  11. Bioprosthesis in the aortic position.  12. Estimated pulmonary artery systolic pressure is 40.9 mmHg assuming a right atrial pressure of 8 mmHg, which is consistent with mild pulmonary hypertension.  13. Endocardial visualization was enhanced with intravenous echo contrast.  14. Mitral valve mean gradient is 2.2 mmHg consistent with mild mitral stenosis.  < end of copied text >    Cath-  < from: Cardiac Cath Lab - Adult (11.10.20 @ 07:43) >  VENTRICLES: EF estimated was 50 %.  VALVES: AORTIC VALVE: There was moderate to severe aortic insufficiency.  MITRAL VALVE: The mitral valve exhibited moderate to severe regurgitation.  CORONARY VESSELS: The coronary circulation is right dominant.  LM:   --  LM: The left anterior descending and circumflex arteries arose anomalously from separate ostia.  LAD:   --  LAD: Angiography showed moderate atherosclerosis.  CX:   --  Proximal circumflex: There was a discrete 60 % stenosis.  --  OM1: The vessel was small to medium sized. There was a discrete 80 % stenosis. The lesion was irregularly contoured and eccentric.  --  OM2: The vessel was medium to large sized. There was a discrete 80 % stenosis. The lesion was irregularly contoured, eccentric, and heavily calcified.  RCA:   --  Proximal RCA: There was a 100 % stenosis. This lesion is a chronic total occlusion. The distal vessel fills via collaterals from the LCA.  AORTA: The root exhibited dilatation. Ascending aorta: The segment was normal in size. Graft repair intact. Aortic arch: Normal. Known residual Type B dissection by CTA. Descending aorta: The segment was normal in size. Known residual Type B dissection by CTA.  ARCH VESSELS: Innominate: Known residual Type B dissection by CTA. Proximal left subclavian: Known residual Type B dissection by CTA. LIMA: Normal. TAMMY: Normal.  COMPLICATIONS: No complications occurred during the cath lab visit.  SUMMARY:  Summary: Addendum 11/11/20: Case reconfirmed to remove Bypass Graph Study, duplicate Left Subclavian procedure,change contrast from 1665 to 165 and add Inominate Angiography procedure  DIAGNOSTIC IMPRESSIONS: - Patent ascending aortic graft  - Severe 2 vessel CAD with chronically occluded proximal RCA. There are brisk collaterals from the LCA.  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, subclavian artery, arch, and descending aorta  - Low normal LV systolic function with moderate to severe AI and MR (best seen on echo)  - The IMAs are both patent  - Cardiac output 5.1 LPM; Cardiac index 2.5  - Markedly elevated LVEDP =38mmHg  DIAGNOSTIC RECOMMENDATIONS: - HD later today as per renal, continue with fluid removal  - Continue ASA and statin  - For OHS with Dr. Butler later this week  - [facility] will continue to follow  < end of copied text >    CXR-  < from: Xray Chest 1 View- PORTABLE-Urgent (06.24.21 @ 06:56) >  Mild cardiomegaly and normal pulmonary vasculature with no gross consolidation, effusion, pneumothorax or acute osseous finding.  Mild-to-moderate perihilar interstitial prominence is most consistent with edema/infiltrate.  Patient is post sternotomy/CABG/cardiac valve surgery.  IMPRESSION:  Moderate interstitial edema/infiltrate with no gross consolidation, new  < end of copied text >    Carotid U/S-  < from: US Duplex Carotid Arteries Complete, Bilateral (11.05.20 @ 16:51) >  IMPRESSION:  Moderate plaque in the right carotid bulb with elevated peak systolic and end-diastolic velocities which overall are suggestive of a 50-69% stenosis as described above, however given the peak systolic velocity is suggestive of a greater than 70% stenosis, aCTA of the neck is recommended for further evaluation.  Mild to moderate plaque in the left carotid bulb with less than 50% left internal carotid artery stenosis.  Arrhythmia.  < end of copied text >

## 2021-06-27 LAB
ALBUMIN SERPL ELPH-MCNC: 4.1 G/DL — SIGNIFICANT CHANGE UP (ref 3.3–5.2)
ALP SERPL-CCNC: 95 U/L — SIGNIFICANT CHANGE UP (ref 40–120)
ALT FLD-CCNC: 16 U/L — SIGNIFICANT CHANGE UP
ANION GAP SERPL CALC-SCNC: 13 MMOL/L — SIGNIFICANT CHANGE UP (ref 5–17)
AST SERPL-CCNC: 11 U/L — SIGNIFICANT CHANGE UP
BILIRUB SERPL-MCNC: 0.9 MG/DL — SIGNIFICANT CHANGE UP (ref 0.4–2)
BUN SERPL-MCNC: 34.6 MG/DL — HIGH (ref 8–20)
CALCIUM SERPL-MCNC: 9.1 MG/DL — SIGNIFICANT CHANGE UP (ref 8.6–10.2)
CHLORIDE SERPL-SCNC: 104 MMOL/L — SIGNIFICANT CHANGE UP (ref 98–107)
CO2 SERPL-SCNC: 27 MMOL/L — SIGNIFICANT CHANGE UP (ref 22–29)
CREAT SERPL-MCNC: 2.57 MG/DL — HIGH (ref 0.5–1.3)
GLUCOSE SERPL-MCNC: 89 MG/DL — SIGNIFICANT CHANGE UP (ref 70–99)
HCT VFR BLD CALC: 31.8 % — LOW (ref 39–50)
HGB BLD-MCNC: 10.1 G/DL — LOW (ref 13–17)
MCHC RBC-ENTMCNC: 30.3 PG — SIGNIFICANT CHANGE UP (ref 27–34)
MCHC RBC-ENTMCNC: 31.8 GM/DL — LOW (ref 32–36)
MCV RBC AUTO: 95.5 FL — SIGNIFICANT CHANGE UP (ref 80–100)
PLATELET # BLD AUTO: 159 K/UL — SIGNIFICANT CHANGE UP (ref 150–400)
POTASSIUM SERPL-MCNC: 3.4 MMOL/L — LOW (ref 3.5–5.3)
POTASSIUM SERPL-SCNC: 3.4 MMOL/L — LOW (ref 3.5–5.3)
PROT SERPL-MCNC: 7.1 G/DL — SIGNIFICANT CHANGE UP (ref 6.6–8.7)
RBC # BLD: 3.33 M/UL — LOW (ref 4.2–5.8)
RBC # FLD: 13.2 % — SIGNIFICANT CHANGE UP (ref 10.3–14.5)
SARS-COV-2 RNA SPEC QL NAA+PROBE: SIGNIFICANT CHANGE UP
SODIUM SERPL-SCNC: 144 MMOL/L — SIGNIFICANT CHANGE UP (ref 135–145)
WBC # BLD: 6.7 K/UL — SIGNIFICANT CHANGE UP (ref 3.8–10.5)
WBC # FLD AUTO: 6.7 K/UL — SIGNIFICANT CHANGE UP (ref 3.8–10.5)

## 2021-06-27 PROCEDURE — 99233 SBSQ HOSP IP/OBS HIGH 50: CPT

## 2021-06-27 RX ORDER — MAGNESIUM SULFATE 500 MG/ML
2 VIAL (ML) INJECTION ONCE
Refills: 0 | Status: COMPLETED | OUTPATIENT
Start: 2021-06-27 | End: 2021-06-27

## 2021-06-27 RX ORDER — POTASSIUM CHLORIDE 20 MEQ
10 PACKET (EA) ORAL
Refills: 0 | Status: COMPLETED | OUTPATIENT
Start: 2021-06-27 | End: 2021-06-27

## 2021-06-27 RX ADMIN — APIXABAN 5 MILLIGRAM(S): 2.5 TABLET, FILM COATED ORAL at 23:04

## 2021-06-27 RX ADMIN — Medication 25 MILLIGRAM(S): at 23:04

## 2021-06-27 RX ADMIN — LEVETIRACETAM 750 MILLIGRAM(S): 250 TABLET, FILM COATED ORAL at 18:00

## 2021-06-27 RX ADMIN — APIXABAN 5 MILLIGRAM(S): 2.5 TABLET, FILM COATED ORAL at 12:14

## 2021-06-27 RX ADMIN — Medication 50 GRAM(S): at 17:19

## 2021-06-27 RX ADMIN — Medication 1 TABLET(S): at 12:14

## 2021-06-27 RX ADMIN — Medication 100 MILLIEQUIVALENT(S): at 18:01

## 2021-06-27 RX ADMIN — GABAPENTIN 100 MILLIGRAM(S): 400 CAPSULE ORAL at 23:04

## 2021-06-27 RX ADMIN — Medication 100 MILLIEQUIVALENT(S): at 20:44

## 2021-06-27 RX ADMIN — ATORVASTATIN CALCIUM 40 MILLIGRAM(S): 80 TABLET, FILM COATED ORAL at 23:04

## 2021-06-27 RX ADMIN — Medication 40 MILLIGRAM(S): at 05:38

## 2021-06-27 RX ADMIN — Medication 25 MILLIGRAM(S): at 12:16

## 2021-06-27 RX ADMIN — Medication 100 MILLIEQUIVALENT(S): at 19:33

## 2021-06-27 RX ADMIN — Medication 40 MILLIGRAM(S): at 17:20

## 2021-06-27 RX ADMIN — Medication 25 MILLIGRAM(S): at 05:38

## 2021-06-27 RX ADMIN — LOSARTAN POTASSIUM 25 MILLIGRAM(S): 100 TABLET, FILM COATED ORAL at 05:38

## 2021-06-27 RX ADMIN — Medication 500 MILLIGRAM(S): at 12:14

## 2021-06-27 RX ADMIN — LEVETIRACETAM 750 MILLIGRAM(S): 250 TABLET, FILM COATED ORAL at 05:38

## 2021-06-27 NOTE — PROGRESS NOTE ADULT - SUBJECTIVE AND OBJECTIVE BOX
Reason for follow up: Atrial fibrillation, CHF,    Update: Patient still in rate controlled Afib, feeling well. Patient responding well to IV lasix as well as HD.     BP tolerating low dose losartan.     Plan for MUSTAPHA/ DCCV tomorrow.     Covid Status: Negative 06/24/21    Review of symptoms:   Cardiac:  No chest pain. No dyspnea. No palpitations.  Respiratory: No cough. No dyspnea  Gastrointestinal: No diarrhea. No abdominal pain. No bleeding.     Past medical history: No updates.   	  Vitals:  T(C): 36.8 (06-27-21 @ 04:40), Max: 37.1 (06-26-21 @ 13:12)  HR: 82 (06-27-21 @ 04:40) (82 - 100)  BP: 121/67 (06-27-21 @ 04:40) (120/72 - 134/85)  RR: 18 (06-27-21 @ 04:40) (18 - 18)  SpO2: 92% (06-27-21 @ 04:40) (92% - 100%)    I&O's Summary    26 Jun 2021 07:01  -  27 Jun 2021 07:00  --------------------------------------------------------  IN: 0 mL / OUT: 0 mL / NET: 0 mL    Weight (kg): 76 (06-24 @ 05:26)    PHYSICAL EXAM:    Appearance: Comfortable. No acute distress  HEENT:  Head and neck: Atraumatic. Normocephalic.  Normal oral mucosa, PERRL, Neck is supple. No JVD, No carotid bruit.   Neurologic: A&Ox 3, no focal deficits. EOMI, Cranial nerves are intact.  Lymphatic: No cervical lymphadenopathy  Cardiovascular: Irregularly irregular,, No murmur, rubs/gallops. No JVD, No edema  Respiratory: Lungs clear to auscultation  Gastrointestinal:  Soft, Non-tender, + BS  Lower Extremities: No edema  Psychiatry: Patient is calm. No agitation. Mood & affect appropriate  Skin: No rashes/ecchymoses/cyanosis/ulcers visualized on the face, hands or feet.    CURRENT MEDICATIONS:  furosemide   Injectable 40 milliGRAM(s) IV Push every 12 hours  losartan 25 milliGRAM(s) Oral daily  metoprolol tartrate 25 milliGRAM(s) Oral every 8 hours  metoprolol tartrate Injectable 5 milliGRAM(s) IV Push every 6 hours PRN    gabapentin  levETIRAcetam  atorvastatin  apixaban  ascorbic acid  multivitamin    MUSTAPHA Echo Doppler (06.25.21 @ 17:45)   PHYSICIAN INTERPRETATION:    Left Ventricle:  Global LV systolic function was mildly decreased. Left ventricular ejection fraction, by visual estimation, is 40 to 45%. Abnormal (paradoxical) septal motion consistent with post-operative status. The mitral in-flow pattern reveals no discernable A-wave, therefore no comment on diastolic function can be made.  Right Ventricle: The right ventricular size is normal. RV systolic function is moderately reduced.  Left Atrium: Left atrial enlargement. No left atrial appendage thrombus is seen.  Right Atrium: Normal right atrial size.  Pericardium: Trivial pericardial effusion is present.  Mitral Valve: There is a mitral valve prosthesis with moderate para-valvular leak.  Tricuspid Valve: The tricuspid valve is normal in structure. Mild tricuspid regurgitation is visualized.  Aortic Valve: There is a well seated aortic valve prosthesis. Acceleration time is 90 msec, suggesting normal function of aortic bioprosthesis.  Aorta: Patient is s/p aortic root repair with type A dissection in 2010. Synthetic graft is seen in the ascending aorta. There is a dissection flap seen in the arch, at least involving the left brachiocephalic artery. There is a small true lumen and a largerfalse lumen which appears mostly thrombosed. There is connection between the true and false lumens with the probe is about 45 cm from the incisors.      Summary:   1. Technically good study.   2. Mildly decreased global left ventricular systolic function.   3. Left ventricular ejection fraction, by visual estimation, is 40 to 45%.   4. Left atrial enlargement.   5. Abnormal septal motion consistent with post-operative status.   6. The mitral in-flow pattern reveals no discernable A-wave, therefore no comment on diastolic function can be made.   7. Moderately reduced RV systolic function.   8. Normal right atrial size.   9. There is a mitral valve prosthesis with moderate para-valvular leak.  10. Mild tricuspid regurgitation.  11. There is a well seated aortic valve prosthesis. Acceleration time is 90 msec, suggesting normal function of aortic bioprosthesis.  12. Patient is s/p aortic root repair with type A dissection in 2010. Synthetic graft is seen in the ascending aorta. There is a dissection flap seen in the arch, at least involving the left brachiocephalic artery. There is a small true lumen and a larger false lumen which appears mostly thrombosed. There is connection between the true and false lumens with the probe is about 45 cm from the incisors.  13. Trivial pericardial effusion.  14. Images were reviewed with Dr. Virk.    MD Gennaro Electronically signed on 6/25/2021 at 6:49:51 PM    CT Angio Abdomen and Pelvis w/ IV Cont (06.25.21 @ 22:03)   FINDINGS:  CHEST:  LUNGS AND LARGE AIRWAYS: Patent central airways. 3 mm left upper lobe nodule, stable. Multifocal reticulonodular scarring/atelectasis, most pronounced in the left lung base. Mosaic attenuation of the lung parenchyma, possibly air trapping.  PLEURA: No pleural effusion.  VESSELS: Stable prior surgical repair of ascending thoracic aortic dissection. Persistent dissection flap extending from the arch inferiorly into the descending thoracic aorta, abdominal aorta, bilateral common iliac and right external iliac arteries. Extension of dissection into the innominate and left subclavian arteries. Coronary artery calcification.  HEART: Aortic and mitral valve prosthesis. No pericardial effusion.  MEDIASTINUM AND KAYLA: No lymphadenopathy.  CHEST WALL AND LOWER NECK: Within normal limits.    ABDOMEN AND PELVIS:  LIVER: Within normal limits.  BILE DUCTS: Normal caliber.  GALLBLADDER: Cholelithiasis.  SPLEEN: Within normal limits.  PANCREAS: Within normal limits.  ADRENALS: Within normal limits.  KIDNEYS/URETERS: Stable small cyst upper pole of the right kidney.    BLADDER: Minimally distended.  REPRODUCTIVE ORGANS: Prostate is enlarged.    BOWEL: Partial colectomy and ileocolic anastomosis in the pelvic region. Mild to moderate dilatation of thesmall bowel and mural thickening in the right lower quadrant. Correlate for enteritis of undetermined etiology.  PERITONEUM: No ascites.  VESSELS: Extension of aortic dissection into the abdomen and pelvis as described above. Celiac axis and superiormesenteric originate from the true lumen. Bilateral renal arteries appear to be originating from the true and false lumen. There is extension of the dissection flap into the left renal artery. No evidence of vascular occlusion.  RETROPERITONEUM/LYMPHNODES: No lymphadenopathy.  ABDOMINAL WALL: Postsurgical changes.  BONES: Prior sternotomy.    IMPRESSION:  Prior surgical repair of the ascending aortic dissection and persistent residual type B dissection extending inferiorly into the common iliac arteries bilaterally and the external iliac artery on the right side. Extension of dissection into the innominate, left subclavian and left renal arteries is also stable.    Mural thickening and mild to moderate dilatation of the right lower quadrant small bowel concerning for enteritis. Suggest clinical correlation.    Additional findings as above.    LABS:	 	  CARDIAC MARKERS ( 26 Jun 2021 14:13 )  x     / x     / x     / x     / x      p-BNP 26 Jun 2021 14:13: 1395 pg/mL, CARDIAC MARKERS ( 24 Jun 2021 19:51 )  x     / <0.01 ng/mL / x     / x     / x      p-BNP 24 Jun 2021 19:51: x                           10.0   6.82  )-----------( 157      ( 26 Jun 2021 14:13 )             30.1     06-26    140  |  100  |  14.8  ----------------------------<  83  3.5   |  29.0  |  0.83    Ca    9.1      26 Jun 2021 14:13  Phos  1.4     06-26    TPro  x   /  Alb  4.3  /  TBili  x   /  DBili  x   /  AST  x   /  ALT  x   /  AlkPhos  x   06-26    proBNP: Serum Pro-Brain Natriuretic Peptide: 1395 pg/mL (06-26 @ 14:13)  Serum Pro-Brain Natriuretic Peptide: 3677 pg/mL (06-24 @ 06:43)    Lipid Profile:   HgA1c:   TSH: Thyroid Stimulating Hormone, Serum: 1.68 uIU/mL    TELEMETRY: Reviewed  Afib HR 70-90s    A/P:  60 y/o M with a PMHx of CAD s/p CABG x 2, AI and MR s/p tAVR, tMVR, HFrEF (EF 45-50% 02/21), ascending aortic dissection s/p repair c/b by CVA with residual right sided weakness, Afib/flutter (on coumadin), Covid, ESRD on HD (Tu/Thur/Sat) s/p LUE AVF, colon resection s/p ostomy with reversal, HTN, seizure disorder, and psoriasis who presented to the ER with complaints of shortness of breath and palpitations. Patient states that he was supposed to go to HD on Tuesday, but he was experiencing palpitations and felt his heart going fast so he didn't feel safe driving. Patient states that the following 2 days he began to have worsening shortness of breath and orthopnea, so he came to the ER to be evaluated. Patient was recently restarted on amiodarone 200mg PO qday for rate control of his Afib, as patient often with hypotension with HD making increasing AV nodals difficult. Patient also notes some severe leg cramping during HD as well. Patient denies fevers, chills, syncope, chest pressure, abdominal pain, N/V/D, headache, or dizziness.     Acute Decompensated HFrEF EF 40%  - Continue metoprolol 25mg PO q8, will transition to Toprol XL s/p DCCV.  - Patient still making urine, continue Lasix 40mg IV BID. Will send home on PO Lasix  - Added losartan 25mg PO q day for GDMT, tolerating well.   - Monitor on telemetry.    - Strict i/o and daily weights.   - Keep K > 4, Mg > 2.      Atrial Fibrillation with RVR  - Cont. metoprolol 25mg PO q8 with IV PRN for sustained RVR.  - Continue Eliquis 5mg PO BID.  - Plan for MUSTAPHA/DCCV 6/28    CAD  - S/p CABG x 2   - On  aspirin, statin, and metoprolol     History of Aortic Dissection  - CTA evaluated by Dr. Fisher and Dr. Lutz, andrey.   - BP control as above.   - No interventions at this time.      Patient with orthopnea,  secondary to HFrEF, AF with RVR  Had MUSTAPHA Friday for planned DCCV; showed possible dissection flap in aortic arch  S/p CTA, unchanged from 11/2020

## 2021-06-27 NOTE — PROGRESS NOTE ADULT - SUBJECTIVE AND OBJECTIVE BOX
[facility]                                                               Office: [address] [address]                                                              Telephone: [phone]. Fax:[phone]                                                                             PROGRESS NOTE  Reason for follow up: Atrial fibrillation, CHF  Update: Patient still in rate controlled Afib, feeling well. Patient responding well to IV lasix as well as HD. BP tolerating low dose losartan. Plan for MUSTAPHA/DCCV tomorrow.   Covid Status: Negative 06/24/21    Review of symptoms:   Cardiac:  No chest pain. No dyspnea. No palpitations.  Respiratory: No cough. No dyspnea  Gastrointestinal: No diarrhea. No abdominal pain. No bleeding.     Past medical history: No updates.   	  Vitals:  T(C): 36.8 (06-27-21 @ 04:40), Max: 37.1 (06-26-21 @ 13:12)  HR: 82 (06-27-21 @ 04:40) (82 - 100)  BP: 121/67 (06-27-21 @ 04:40) (120/72 - 134/85)  RR: 18 (06-27-21 @ 04:40) (18 - 18)  SpO2: 92% (06-27-21 @ 04:40) (92% - 100%)  Wt(kg): --  I&O's Summary    26 Jun 2021 07:01  -  27 Jun 2021 07:00  --------------------------------------------------------  IN: 0 mL / OUT: 0 mL / NET: 0 mL      Weight (kg): 76 (06-24 @ 05:26)      PHYSICAL EXAM:  Appearance: Comfortable. No acute distress  HEENT:  Head and neck: Atraumatic. Normocephalic.  Normal oral mucosa, PERRL, Neck is supple. No JVD, No carotid bruit.   Neurologic: A&Ox 3, no focal deficits. EOMI, Cranial nerves are intact.  Lymphatic: No cervical lymphadenopathy  Cardiovascular: Irregularly irregular,, No murmur, rubs/gallops. No JVD, No edema  Respiratory: Lungs clear to auscultation  Gastrointestinal:  Soft, Non-tender, + BS  Lower Extremities: No edema  Psychiatry: Patient is calm. No agitation. Mood & affect appropriate  Skin: No rashes/ecchymoses/cyanosis/ulcers visualized on the face, hands or feet.        CURRENT MEDICATIONS:  furosemide   Injectable 40 milliGRAM(s) IV Push every 12 hours  losartan 25 milliGRAM(s) Oral daily  metoprolol tartrate 25 milliGRAM(s) Oral every 8 hours  metoprolol tartrate Injectable 5 milliGRAM(s) IV Push every 6 hours PRN    gabapentin  levETIRAcetam  atorvastatin  apixaban  ascorbic acid  multivitamin      DIAGNOSTIC TESTING:  DIAGNOSTIC TESTING:  [ ] Echocardiogram:   < from: MUSTAPHA Echo Doppler (06.25.21 @ 17:45) >  PHYSICIAN INTERPRETATION:  Left Ventricle:  Global LV systolic function was mildly decreased. Left ventricular ejection fraction, by visual estimation, is 40 to 45%. Abnormal (paradoxical) septal motion consistent with post-operative status. The mitral in-flow pattern reveals no discernable A-wave, therefore no comment on diastolic function can be made.  Right Ventricle: The right ventricular size is normal. RV systolic function is moderately reduced.  Left Atrium: Left atrial enlargement. No left atrial appendage thrombus is seen.  Right Atrium: Normal right atrial size.  Pericardium: Trivial pericardial effusion is present.  Mitral Valve: There is a mitral valve prosthesis with moderate para-valvular leak.  Tricuspid Valve: The tricuspid valve is normal in structure. Mild tricuspid regurgitation is visualized.  Aortic Valve: There is a well seated aortic valve prosthesis. Acceleration time is 90 msec, suggesting normal function of aortic bioprosthesis.  Aorta: Patient is s/p aortic root repair with type A dissection in 2010. Synthetic graft is seen in the ascending aorta. There is a dissection flap seen in the arch, at least involving the left brachiocephalic artery. There is a small true lumen and a largerfalse lumen which appears mostly thrombosed. There is connection between the true and false lumens with the probe is about 45 cm from the incisors.      Summary:   1. Technically good study.   2. Mildly decreased global left ventricular systolic function.   3. Left ventricular ejection fraction, by visual estimation, is 40 to 45%.   4. Left atrial enlargement.   5. Abnormal septal motion consistent with post-operative status.   6. The mitral in-flow pattern reveals no discernable A-wave, therefore no comment on diastolic function can be made.   7. Moderately reduced RV systolic function.   8. Normal right atrial size.   9. There is a mitral valve prosthesis with moderate para-valvular leak.  10. Mild tricuspid regurgitation.  11. There is a well seated aortic valve prosthesis. Acceleration time is 90 msec, suggesting normal function of aortic bioprosthesis.  12. Patient is s/p aortic root repair with type A dissection in 2010. Synthetic graft is seen in the ascending aorta. There is a dissection flap seen in the arch, at least involving the left brachiocephalic artery. There is a small true lumen and a larger false lumen which appears mostly thrombosed. There is connection between the true and false lumens with the probe is about 45 cm from the incisors.  13. Trivial pericardial effusion.  14. Images were reviewed with Dr. Virk.    MD Gennaro Electronically signed on 6/25/2021 at 6:49:51 PM      < end of copied text >    < from: CT Angio Abdomen and Pelvis w/ IV Cont (06.25.21 @ 22:03) >  FINDINGS:  CHEST:  LUNGS AND LARGE AIRWAYS: Patent central airways. 3 mm left upper lobe nodule, stable. Multifocal reticulonodular scarring/atelectasis, most pronounced in the left lung base. Mosaic attenuation of the lung parenchyma, possibly air trapping.  PLEURA: No pleural effusion.  VESSELS: Stable prior surgical repair of ascending thoracic aortic dissection. Persistent dissection flap extending from the arch inferiorly into the descending thoracic aorta, abdominal aorta, bilateral common iliac and right external iliac arteries. Extension of dissection into the innominate and left subclavian arteries. Coronary artery calcification.  HEART: Aortic and mitral valve prosthesis. No pericardial effusion.  MEDIASTINUM AND KAYLA: No lymphadenopathy.  CHEST WALL AND LOWER NECK: Within normal limits.    ABDOMEN AND PELVIS:  LIVER: Within normal limits.  BILE DUCTS: Normal caliber.  GALLBLADDER: Cholelithiasis.  SPLEEN: Within normal limits.  PANCREAS: Within normal limits.  ADRENALS: Within normal limits.  KIDNEYS/URETERS: Stable small cyst upper pole of the right kidney.    BLADDER: Minimally distended.  REPRODUCTIVE ORGANS: Prostate is enlarged.    BOWEL: Partial colectomy and ileocolic anastomosis in the pelvic region. Mild to moderate dilatation of thesmall bowel and mural thickening in the right lower quadrant. Correlate for enteritis of undetermined etiology.  PERITONEUM: No ascites.  VESSELS: Extension of aortic dissection into the abdomen and pelvis as described above. Celiac axis and superiormesenteric originate from the true lumen. Bilateral renal arteries appear to be originating from the true and false lumen. There is extension of the dissection flap into the left renal artery. No evidence of vascular occlusion.  RETROPERITONEUM/LYMPHNODES: No lymphadenopathy.  ABDOMINAL WALL: Postsurgical changes.  BONES: Prior sternotomy.    IMPRESSION:  Prior surgical repair of the ascending aortic dissection and persistent residual type B dissection extending inferiorly into the common iliac arteries bilaterally and the external iliac artery on the right side. Extension of dissection into the innominate, left subclavian and left renal arteries is also stable.    Mural thickening and mild to moderate dilatation of the right lower quadrant small bowel concerning for enteritis. Suggest clinical correlation.    Additional findings as above.    < end of copied text >        LABS:	 	  CARDIAC MARKERS ( 26 Jun 2021 14:13 )  x     / x     / x     / x     / x      p-BNP 26 Jun 2021 14:13: 1395 pg/mL, CARDIAC MARKERS ( 24 Jun 2021 19:51 )  x     / <0.01 ng/mL / x     / x     / x      p-BNP 24 Jun 2021 19:51: x                              10.0   6.82  )-----------( 157      ( 26 Jun 2021 14:13 )             30.1     06-26    140  |  100  |  14.8  ----------------------------<  83  3.5   |  29.0  |  0.83    Ca    9.1      26 Jun 2021 14:13  Phos  1.4     06-26    TPro  x   /  Alb  4.3  /  TBili  x   /  DBili  x   /  AST  x   /  ALT  x   /  AlkPhos  x   06-26    proBNP: Serum Pro-Brain Natriuretic Peptide: 1395 pg/mL (06-26 @ 14:13)  Serum Pro-Brain Natriuretic Peptide: 3677 pg/mL (06-24 @ 06:43)    Lipid Profile:   HgA1c:   TSH: Thyroid Stimulating Hormone, Serum: 1.68 uIU/mL        TELEMETRY: Reviewed  Afib HR 70-90s

## 2021-06-27 NOTE — PROGRESS NOTE ADULT - SUBJECTIVE AND OBJECTIVE BOX
seen for afib, esrd      no acute complaints  feels well  ros negative  awaiting cardioversion    MEDICATIONS  (STANDING):  apixaban 5 milliGRAM(s) Oral <User Schedule>  ascorbic acid 500 milliGRAM(s) Oral daily  atorvastatin 40 milliGRAM(s) Oral at bedtime  furosemide   Injectable 40 milliGRAM(s) IV Push every 12 hours  gabapentin 100 milliGRAM(s) Oral at bedtime  levETIRAcetam 750 milliGRAM(s) Oral two times a day  losartan 25 milliGRAM(s) Oral daily  magnesium sulfate  IVPB 2 Gram(s) IV Intermittent once  metoprolol tartrate 25 milliGRAM(s) Oral every 8 hours  multivitamin 1 Tablet(s) Oral daily  potassium chloride  10 mEq/100 mL IVPB 10 milliEquivalent(s) IV Intermittent every 1 hour    MEDICATIONS  (PRN):  metoprolol tartrate Injectable 5 milliGRAM(s) IV Push every 6 hours PRN HR >120      Allergies    penicillin (Other)    Vital Signs Last 24 Hrs  T(C): 37 (27 Jun 2021 11:14), Max: 37 (27 Jun 2021 11:14)  T(F): 98.6 (27 Jun 2021 11:14), Max: 98.6 (27 Jun 2021 11:14)  HR: 80 (27 Jun 2021 11:14) (80 - 91)  BP: 127/78 (27 Jun 2021 11:14) (120/72 - 134/64)  BP(mean): --  RR: 17 (27 Jun 2021 11:14) (17 - 18)  SpO2: 98% (27 Jun 2021 11:14) (92% - 98%)    PHYSICAL EXAM:    GENERAL: NAD  CHEST/LUNG: Clear to percussion bilaterally  HEART: irreg irreg rate and rhythm; S1 S2  ABDOMEN: Soft, Nontender, Nondistended; Bowel sounds present  EXTREMITIES: no edema  NERVOUS SYSTEM:  Alert & Oriented X3, Motor Strength 5/5 B/L upper and lower extremities  PSYCH: normal mood, appropriate response.    LABS:                        10.1   6.70  )-----------( 159      ( 27 Jun 2021 08:58 )             31.8     06-27    144  |  104  |  34.6<H>  ----------------------------<  89  3.4<L>   |  27.0  |  2.57<H>    Ca    9.1      27 Jun 2021 08:58  Phos  1.4     06-26    TPro  7.1  /  Alb  4.1  /  TBili  0.9  /  DBili  x   /  AST  11  /  ALT  16  /  AlkPhos  95  06-27          CAPILLARY BLOOD GLUCOSE            RADIOLOGY & ADDITIONAL TESTS:

## 2021-06-28 ENCOUNTER — TRANSCRIPTION ENCOUNTER (OUTPATIENT)
Age: 61
End: 2021-06-28

## 2021-06-28 LAB
ANION GAP SERPL CALC-SCNC: 14 MMOL/L — SIGNIFICANT CHANGE UP (ref 5–17)
BUN SERPL-MCNC: 52.8 MG/DL — HIGH (ref 8–20)
CALCIUM SERPL-MCNC: 8.8 MG/DL — SIGNIFICANT CHANGE UP (ref 8.6–10.2)
CHLORIDE SERPL-SCNC: 102 MMOL/L — SIGNIFICANT CHANGE UP (ref 98–107)
CO2 SERPL-SCNC: 25 MMOL/L — SIGNIFICANT CHANGE UP (ref 22–29)
CREAT SERPL-MCNC: 3.23 MG/DL — HIGH (ref 0.5–1.3)
GLUCOSE SERPL-MCNC: 94 MG/DL — SIGNIFICANT CHANGE UP (ref 70–99)
HCT VFR BLD CALC: 31.1 % — LOW (ref 39–50)
HGB BLD-MCNC: 9.8 G/DL — LOW (ref 13–17)
MAGNESIUM SERPL-MCNC: 2.5 MG/DL — SIGNIFICANT CHANGE UP (ref 1.6–2.6)
MCHC RBC-ENTMCNC: 30 PG — SIGNIFICANT CHANGE UP (ref 27–34)
MCHC RBC-ENTMCNC: 31.5 GM/DL — LOW (ref 32–36)
MCV RBC AUTO: 95.1 FL — SIGNIFICANT CHANGE UP (ref 80–100)
PLATELET # BLD AUTO: 160 K/UL — SIGNIFICANT CHANGE UP (ref 150–400)
POTASSIUM SERPL-MCNC: 3.5 MMOL/L — SIGNIFICANT CHANGE UP (ref 3.5–5.3)
POTASSIUM SERPL-SCNC: 3.5 MMOL/L — SIGNIFICANT CHANGE UP (ref 3.5–5.3)
RBC # BLD: 3.27 M/UL — LOW (ref 4.2–5.8)
RBC # FLD: 13.2 % — SIGNIFICANT CHANGE UP (ref 10.3–14.5)
SODIUM SERPL-SCNC: 141 MMOL/L — SIGNIFICANT CHANGE UP (ref 135–145)
WBC # BLD: 7.49 K/UL — SIGNIFICANT CHANGE UP (ref 3.8–10.5)
WBC # FLD AUTO: 7.49 K/UL — SIGNIFICANT CHANGE UP (ref 3.8–10.5)

## 2021-06-28 PROCEDURE — 99233 SBSQ HOSP IP/OBS HIGH 50: CPT

## 2021-06-28 PROCEDURE — 93320 DOPPLER ECHO COMPLETE: CPT | Mod: 26

## 2021-06-28 PROCEDURE — 93010 ELECTROCARDIOGRAM REPORT: CPT

## 2021-06-28 PROCEDURE — 93312 ECHO TRANSESOPHAGEAL: CPT | Mod: 26

## 2021-06-28 PROCEDURE — 99232 SBSQ HOSP IP/OBS MODERATE 35: CPT

## 2021-06-28 PROCEDURE — 93325 DOPPLER ECHO COLOR FLOW MAPG: CPT | Mod: 26

## 2021-06-28 RX ORDER — MAGNESIUM OXIDE 400 MG ORAL TABLET 241.3 MG
400 TABLET ORAL
Refills: 0 | Status: DISCONTINUED | OUTPATIENT
Start: 2021-06-28 | End: 2021-06-29

## 2021-06-28 RX ORDER — METOPROLOL TARTRATE 50 MG
75 TABLET ORAL DAILY
Refills: 0 | Status: DISCONTINUED | OUTPATIENT
Start: 2021-06-29 | End: 2021-06-29

## 2021-06-28 RX ORDER — FUROSEMIDE 40 MG
40 TABLET ORAL DAILY
Refills: 0 | Status: DISCONTINUED | OUTPATIENT
Start: 2021-06-29 | End: 2021-06-29

## 2021-06-28 RX ORDER — METOPROLOL TARTRATE 50 MG
25 TABLET ORAL ONCE
Refills: 0 | Status: COMPLETED | OUTPATIENT
Start: 2021-06-28 | End: 2021-06-28

## 2021-06-28 RX ORDER — APIXABAN 2.5 MG/1
5 TABLET, FILM COATED ORAL
Refills: 0 | Status: DISCONTINUED | OUTPATIENT
Start: 2021-06-28 | End: 2021-06-29

## 2021-06-28 RX ADMIN — Medication 40 MILLIGRAM(S): at 05:08

## 2021-06-28 RX ADMIN — LEVETIRACETAM 750 MILLIGRAM(S): 250 TABLET, FILM COATED ORAL at 17:37

## 2021-06-28 RX ADMIN — LEVETIRACETAM 750 MILLIGRAM(S): 250 TABLET, FILM COATED ORAL at 05:09

## 2021-06-28 RX ADMIN — APIXABAN 5 MILLIGRAM(S): 2.5 TABLET, FILM COATED ORAL at 21:10

## 2021-06-28 RX ADMIN — MAGNESIUM OXIDE 400 MG ORAL TABLET 400 MILLIGRAM(S): 241.3 TABLET ORAL at 17:37

## 2021-06-28 RX ADMIN — Medication 1 TABLET(S): at 13:42

## 2021-06-28 RX ADMIN — ATORVASTATIN CALCIUM 40 MILLIGRAM(S): 80 TABLET, FILM COATED ORAL at 21:10

## 2021-06-28 RX ADMIN — GABAPENTIN 100 MILLIGRAM(S): 400 CAPSULE ORAL at 21:10

## 2021-06-28 RX ADMIN — Medication 500 MILLIGRAM(S): at 13:41

## 2021-06-28 RX ADMIN — APIXABAN 5 MILLIGRAM(S): 2.5 TABLET, FILM COATED ORAL at 10:04

## 2021-06-28 RX ADMIN — MAGNESIUM OXIDE 400 MG ORAL TABLET 400 MILLIGRAM(S): 241.3 TABLET ORAL at 13:41

## 2021-06-28 RX ADMIN — Medication 25 MILLIGRAM(S): at 17:37

## 2021-06-28 NOTE — DISCHARGE NOTE PROVIDER - NSDCFUSCHEDAPPT_GEN_ALL_CORE_FT
JONATHAN GIBSON ; 08/03/2021 ; [facility] [address] JONATHAN Damon ; 08/03/2021 ; [facility] Neurology [facility]

## 2021-06-28 NOTE — DISCHARGE NOTE PROVIDER - NSDCMRMEDTOKEN_GEN_ALL_CORE_FT
ascorbic acid 500 mg oral tablet: 1 tab(s) orally once a day  aspirin 81 mg oral delayed release tablet: 1 tab(s) orally once a day  atorvastatin 40 mg oral tablet: 1 tab(s) orally once a day (at bedtime)  Eliquis 5 mg oral tablet: 1  orally once a day  gabapentin 100 mg oral capsule: 1 cap(s) orally once a day (at bedtime)  levETIRAcetam 750 mg oral tablet: 1 tab(s) orally 2 times a day  metoprolol succinate 25 mg oral tablet, extended release: 1 tab(s) orally once a day on dialysis days (Tuesday/Thursday/Saturday)  metoprolol tartrate 25 mg oral tablet: 1 tab(s) orally every 12 hours on non-dialysis days (Sunday/Monday/Wednesday/Friday)  pantoprazole 40 mg oral delayed release tablet: 1 tab(s) orally once a day  sevelamer hydrochloride 800 mg oral tablet: 1 tab(s) orally 2 times a day (with meals)  Tab-A-Russel oral tablet: 1 tab(s) orally once a day   amiodarone 200 mg oral tablet: 0.5 tab(s) orally once a day  ascorbic acid 500 mg oral tablet: 1 tab(s) orally once a day  aspirin 81 mg oral delayed release tablet: 1 tab(s) orally once a day  atorvastatin 40 mg oral tablet: 1 tab(s) orally once a day (at bedtime)  Eliquis 5 mg oral tablet: 1  orally once a day  gabapentin 100 mg oral capsule: 1 cap(s) orally once a day (at bedtime)  levETIRAcetam 750 mg oral tablet: 1 tab(s) orally 2 times a day  losartan 25 mg oral tablet: 1 tab(s) orally once a day  metoprolol succinate 50 mg oral tablet, extended release: 1 tab(s) orally once a day   pantoprazole 40 mg oral delayed release tablet: 1 tab(s) orally once a day  sevelamer hydrochloride 800 mg oral tablet: 1 tab(s) orally 2 times a day (with meals)  Tab-A-Russel oral tablet: 1 tab(s) orally once a day  torsemide 20 mg oral tablet: 1 tab(s) orally every other day   take on MONDAYS, FRIDAYS AND SUNDAYS

## 2021-06-28 NOTE — DISCHARGE NOTE PROVIDER - PROVIDER TOKENS
PROVIDER:[TOKEN:[[id number]]],PROVIDER:[TOKEN:[[id number]]],PROVIDER:[TOKEN:[[id number]]]

## 2021-06-28 NOTE — PROGRESS NOTE ADULT - SUBJECTIVE AND OBJECTIVE BOX
[facility] DIVISION OF KIDNEY DISEASES AND HYPERTENSION -- FOLLOW UP NOTE  --------------------------------------------------------------------------------  Chief Complaint:  ESRD HD    24 hour events/subjective:  Seen/examined this AM  Cardioversion today;  HD planned for AM      PAST HISTORY  --------------------------------------------------------------------------------  No significant changes to PMH, PSH, FHx, SHx, unless otherwise noted    ALLERGIES & MEDICATIONS  --------------------------------------------------------------------------------  Allergies    penicillin (Other)    Intolerances      Standing Inpatient Medications  apixaban 5 milliGRAM(s) Oral <User Schedule>  ascorbic acid 500 milliGRAM(s) Oral daily  atorvastatin 40 milliGRAM(s) Oral at bedtime  furosemide   Injectable 40 milliGRAM(s) IV Push every 12 hours  gabapentin 100 milliGRAM(s) Oral at bedtime  levETIRAcetam 750 milliGRAM(s) Oral two times a day  losartan 25 milliGRAM(s) Oral daily  magnesium oxide 400 milliGRAM(s) Oral three times a day with meals  metoprolol tartrate 25 milliGRAM(s) Oral every 8 hours  multivitamin 1 Tablet(s) Oral daily    PRN Inpatient Medications  metoprolol tartrate Injectable 5 milliGRAM(s) IV Push every 6 hours PRN      REVIEW OF SYSTEMS  --------------------------------------------------------------------------------  Gen: No weight changes, fatigue, fevers/chills, weakness  Skin: No rashes  Head/Eyes/Ears/Mouth: No headache; Normal hearing; Normal vision w/o blurriness; No sinus pain/discomfort, sore throat  Respiratory: No dyspnea, cough, wheezing, hemoptysis  CV: No chest pain, PND, orthopnea  GI: No abdominal pain, diarrhea, constipation, nausea, vomiting, melena, hematochezia  : No increased frequency, dysuria, hematuria, nocturia  MSK: No joint pain/swelling; no back pain; no edema  Neuro: No dizziness/lightheadedness, weakness, seizures, numbness, tingling  Heme: No easy bruising or bleeding  Endo: No heat/cold intolerance  Psych: No significant nervousness, anxiety, stress, depression    All other systems were reviewed and are negative, except as noted.    VITALS/PHYSICAL EXAM  --------------------------------------------------------------------------------  T(C): 36.7 (06-28-21 @ 04:37), Max: 37 (06-27-21 @ 11:14)  HR: 77 (06-28-21 @ 04:37) (77 - 81)  BP: 104/59 (06-28-21 @ 04:37) (100/62 - 127/78)  RR: 18 (06-28-21 @ 04:37) (17 - 18)  SpO2: 98% (06-28-21 @ 04:37) (96% - 99%)  Wt(kg): --        Physical Exam:  	Gen: NAD, well-appearing  	HEENT: PERRL, supple neck, clear oropharynx  	Pulm: CTA B/L  	CV: RRR, S1S2; no rub  	Back: No spinal or CVA tenderness; no sacral edema  	Abd: +BS, soft, nontender/nondistended  	: No suprapubic tenderness  	UE: Warm, FROM, no clubbing, intact strength; no edema; no asterixis  	LE: Warm, FROM, no clubbing, intact strength; no edema  	Neuro: No focal deficits, intact gait  	Psych: Normal affect and mood  	Skin: Warm, without rashes  	Vascular access:    LABS/STUDIES  --------------------------------------------------------------------------------              9.8    7.49  >-----------<  160      [06-28-21 @ 07:27]              31.1     141  |  102  |  52.8  ----------------------------<  94      [06-28-21 @ 07:27]  3.5   |  25.0  |  3.23        Ca     8.8     [06-28-21 @ 07:27]      Mg     2.5     [06-28-21 @ 07:27]      Phos  1.4     [06-26-21 @ 14:13]    TPro  7.1  /  Alb  4.1  /  TBili  0.9  /  DBili  x   /  AST  11  /  ALT  16  /  AlkPhos  95  [06-27-21 @ 08:58]          Creatinine Trend:  SCr 3.23 [06-28 @ 07:27]  SCr 2.57 [06-27 @ 08:58]  SCr 0.83 [06-26 @ 14:13]  SCr 1.99 [06-25 @ 07:34]  SCr 2.00 [06-24 @ 06:43]        Iron 29, TIBC 227, %sat 13      [12-05-20 @ 08:05]  Ferritin 1099      [12-05-20 @ 08:05]  TSH 1.68      [06-25-21 @ 07:34]  Lipid: chol --, , HDL --, LDL --      [11-15-20 @ 02:40]    HBsAb <3.0      [06-25-21 @ 02:07]  HBsAg Nonreact      [06-25-21 @ 02:07]

## 2021-06-28 NOTE — DISCHARGE NOTE PROVIDER - NSDCCPCAREPLAN_GEN_ALL_CORE_FT
PRINCIPAL DISCHARGE DIAGNOSIS  Diagnosis: ESRD on dialysis  Assessment and Plan of Treatment: continue outpatient dialysis      SECONDARY DISCHARGE DIAGNOSES  Diagnosis: Atrial flutter  Assessment and Plan of Treatment: underwent cardioversion.  follow up with EP and cardiology in 1-2 weeks.  take medications as prescribed.     PRINCIPAL DISCHARGE DIAGNOSIS  Diagnosis: ESRD on dialysis  Assessment and Plan of Treatment: continue outpatient dialysis      SECONDARY DISCHARGE DIAGNOSES  Diagnosis: H/O aortic dissection  Assessment and Plan of Treatment: stable, outpatient follow up    Diagnosis: Atrial flutter  Assessment and Plan of Treatment: underwent cardioversion.  follow up with EP and cardiology in 1-2 weeks.  take medications as prescribed.

## 2021-06-28 NOTE — PROGRESS NOTE ADULT - SUBJECTIVE AND OBJECTIVE BOX
Pt doing well s/p uncomplicated MUSTAPHA & DCCV, 250J X 1 with restoration of sinus rhythm. Pt denies complaint post procedure.      PAST MEDICAL & SURGICAL HISTORY:  CHF (congestive heart failure)  CVA (cerebral vascular accident)  Seizures last seizure 10 years ago  HTN (hypertension)  Hyperlipemia  COVID-19 october 2020  Atrial fibrillation  ESRD on dialysis Tu/Thurs/Sat  Former smoker  History of cocaine use remote hx, currently sober  H/O aortic dissection s/p repair (2010), surgery complicated by bowel ischemia s/p bowel resection and ostomy with reversal  H/O aortic valve insufficiency  Mitral regurgitation  Aorta disorder  H/O colectomy  Status post double vessel coronary artery bypass  S/P AVR (aortic valve replacement)  S/P MVR (mitral valve replacement)    MEDICATIONS  (STANDING):  apixaban 5 milliGRAM(s) Oral <User Schedule>  ascorbic acid 500 milliGRAM(s) Oral daily  atorvastatin 40 milliGRAM(s) Oral at bedtime  furosemide   Injectable 40 milliGRAM(s) IV Push every 12 hours  gabapentin 100 milliGRAM(s) Oral at bedtime  levETIRAcetam 750 milliGRAM(s) Oral two times a day  losartan 25 milliGRAM(s) Oral daily  magnesium oxide 400 milliGRAM(s) Oral three times a day with meals  metoprolol tartrate 25 milliGRAM(s) Oral every 8 hours  multivitamin 1 Tablet(s) Oral daily    MEDICATIONS  (PRN):  metoprolol tartrate Injectable 5 milliGRAM(s) IV Push every 6 hours PRN HR >120    Post-procedure VS: BP 90/53 HR 61 O2 sat 97% RR 16   awake, alert, no obvious distress  Skin: no erythema/edema/blistering at defib pad sites.   Card: S1/S2, RRR, no m/g/r  Resp: lungs CTA b/l  Abd: S/NT/ND  Ext: no edema, distal pulses intact    MUSTAPHA: no thrombus  EKG: sinus rhythm, 62 bpm, LAD     Assessment:    60 y/o male with a PMHx of psoriasis, Cocaine abuse (quit 2010), Aortic dissection s/p emergent repair (2010), surgery complicated by CVA w/ residual left sided weakness and bowel ischemia s/p bowel resection and ostomy with reversal repair, Covid infection, ESRD on HD (Tu/Thur/Sat) s/p LUE AVF, HTN, seizure disorder, GIB s/p colonoscopy and AVMs cauterization 3/2021 and CAD s/p remote stent to LAD and recent CABG x 2 (DGV-OM, RPDA with Dr. Butler 11/2020), AI and MR s/p tAVR, tMVR (11/2020 Dr. Butler) with post-op pAfib/flutter (on eliquis), HFrEF (EF 45-50% 02/21), who presented to the ER with complaints of shortness of breath and palpitations x 3 days found to be fluid overloaded with symptomatic atrial flutter w/ RVR. EP following for atrial arrhythmia management. Plan was MUSTAPHA/DCCV on 6/26/21 however MUSTAPHA demonstrated incidental finding of a possible ascending aortic root aneurysm in the setting of a prior Type A dissection repair in 2010.  Pt with a known chronic type B dissection noted on preop CT scan (11/2020), however no mention of issues in the ascending aorta. DCCV was canceled and CTSx consult appreciated. Now status post limited MUSTAPHA which demonstrated no BLAINE thrombus and DCCV with restoration of sinus rhythm. Pt reportedly had a 9 second conversion pause at time of cardioversion.     # atrial fibrillation, now status post MUSTAPHA negative for BLAINE thrombus and uncomplicated DCCV with restoration of sinus rhythm.   - observation on telemetry per post op protocol   - continue Eliquis 5mg PO BID. Importance of strict compliance with anticoagulation regimen reinforced with patient.     # ascending aortic root aneurysm   - CTSx follow up appreciated, they reviewed CT scans and reports. Type B aortic dissection appears stable since Nov 2020. 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 No contrast extravasation or hematoma. Patient had been maintained on Eliquis prior to admission.   - Discharge when stable per cardio and medicine  - No further surgical interventions required    # HFrEF  - EF previously 45-50% 2/21 now 40-45%  - continue metoprolol  - pt may benefit from addition of ACEI/ARB >ESRD on HD, BP will tolerate. Changes per primary cardiology team  - possibly due to current atrial arrhythmia, reassess EF when in sinus rhythm     # ESRD on HD  - HD per renal team

## 2021-06-28 NOTE — DISCHARGE NOTE PROVIDER - CARE PROVIDERS DIRECT ADDRESSES
,[email or web address]

## 2021-06-28 NOTE — PROGRESS NOTE ADULT - SUBJECTIVE AND OBJECTIVE BOX
seen for aflutter, esrd    no acute complaints/events  ros negative  feels well      MEDICATIONS  (STANDING):  apixaban 5 milliGRAM(s) Oral <User Schedule>  ascorbic acid 500 milliGRAM(s) Oral daily  atorvastatin 40 milliGRAM(s) Oral at bedtime  gabapentin 100 milliGRAM(s) Oral at bedtime  levETIRAcetam 750 milliGRAM(s) Oral two times a day  losartan 25 milliGRAM(s) Oral daily  magnesium oxide 400 milliGRAM(s) Oral three times a day with meals  metoprolol tartrate 25 milliGRAM(s) Oral once  multivitamin 1 Tablet(s) Oral daily    MEDICATIONS  (PRN):  metoprolol tartrate Injectable 5 milliGRAM(s) IV Push every 6 hours PRN HR >120      Allergies    penicillin (Other)      Vital Signs Last 24 Hrs  T(C): 36.7 (28 Jun 2021 04:37), Max: 36.9 (27 Jun 2021 16:40)  T(F): 98.1 (28 Jun 2021 04:37), Max: 98.5 (27 Jun 2021 16:40)  HR: 77 (28 Jun 2021 04:37) (77 - 81)  BP: 104/59 (28 Jun 2021 04:37) (100/62 - 111/64)  BP(mean): --  RR: 18 (28 Jun 2021 04:37) (18 - 18)  SpO2: 98% (28 Jun 2021 04:37) (96% - 99%)    PHYSICAL EXAM:    GENERAL: NAD  CHEST/LUNG: Clear to percussion bilaterally  HEART: Regular rate and rhythm; S1 S2  ABDOMEN: Soft, , Nndistended; Bowel sounds present  EXTREMITIES:  no edema   NERVOUS SYSTEM:  Alert & Oriented X3, Motor Strength 5/5 B/L upper and lower extremities  PSYCH: normal mood, appropriate response.    LABS:                        9.8    7.49  )-----------( 160      ( 28 Jun 2021 07:27 )             31.1     06-28    141  |  102  |  52.8<H>  ----------------------------<  94  3.5   |  25.0  |  3.23<H>    Ca    8.8      28 Jun 2021 07:27  Phos  1.4     06-26  Mg     2.5     06-28    TPro  7.1  /  Alb  4.1  /  TBili  0.9  /  DBili  x   /  AST  11  /  ALT  16  /  AlkPhos  95  06-27          CAPILLARY BLOOD GLUCOSE            RADIOLOGY & ADDITIONAL TESTS:

## 2021-06-28 NOTE — DISCHARGE NOTE PROVIDER - HOSPITAL COURSE
60 y/o M w/ PMHx ESRD on HD on Tu/Thur/Sat (missed scheduled dialysis on 06/22) ,  CAD s/p remote PCI, AT/Aflutter, s/p CABG for aortic insufficiency and mitral regurgitation s/p bioAVR and bioMVR,  HFrEF (EF 40%), ascending aortic dissection s/p surgery complicated by CVA with residual left-sided weakness, colon resection for bowel resection s/p ostomy with reversal, also hx HTN, presented to the ED with  c/o sob and palpitations x 3 days found to be in  symptomatic AFlutter RVR.   Treated for fluid overload with IV lasix and dialysis with improvement.  underwent MUSTAPHA, noted to have aortic aneurysm with history of repair and evaluated by CT surgery with findings being chronic and no intervention warranted.  Underwent successful cardioversion and cleared for discharge home by EP and cardiology.    dc planning 34 minutes  vitals stable afebrile aaox3 nad rrr s1s2 ctab soft abdomen no LE edema nonfocal neuro ambulatory.  ros negative feels at baseline     60 y/o M w/ PMHx ESRD on HD on Tu/Thur/Sat (missed scheduled dialysis on 06/22) ,  CAD s/p remote PCI, AT/Aflutter, s/p CABG for aortic insufficiency and mitral regurgitation s/p bioAVR and bioMVR,  HFrEF (EF 40%), ascending aortic dissection s/p surgery complicated by CVA with residual left-sided weakness, colon resection for bowel resection s/p ostomy with reversal, also hx HTN, presented to the ED with  c/o sob and palpitations x 3 days found to be in  symptomatic AFlutter RVR.   Treated for fluid overload with IV lasix and dialysis with improvement.  underwent MUSTAPHA, noted to have aortic aneurysm with history of repair and evaluated by CT surgery with findings being chronic and no intervention warranted.  Underwent successful cardioversion and cleared for discharge home by EP and cardiology. Nephrology initiated torsemide on non dialysis days.  decrease toprol from 75mg to 50mg on discharge due to bradycardia.    dc planning 34 minutes  vitals stable afebrile aaox3 nad rrr s1s2 ctab soft abdomen no LE edema nonfocal neuro ambulatory.  ros negative feels at baseline

## 2021-06-28 NOTE — DISCHARGE NOTE PROVIDER - CARE PROVIDER_API CALL
Lucie Virk)  Cardiac Electrophysiology; Cardiovascular Disease; Internal Medicine  [address] [place] [address]  Phone: [phone]  Fax: [phone]  Follow Up Time:     Felice Frankel)  Cardiovascular Disease; Interventional Cardiology  [address] [place] [address], [address]  [place] [address]  Phone: [phone]  Fax: [phone]  Follow Up Time:     Dago Reyes)  Internal Medicine; Nephrology  [address] [place] [address]  Phone: [phone]  Fax: [phone]  Follow Up Time:

## 2021-06-28 NOTE — PROGRESS NOTE ADULT - ATTENDING COMMENTS
Patient with SOB secondary to HFrEF, AF with RVR  Had MUSTAPHA Friday for planned DCCV; showed possible dissection flap in aortic arch  S/p CTA, unchanged from 11/2020  Continue medications as above  Plan for DCCV Monday     Thank you for allowing me to participate in the care of this patient  Will continue to follow
Seen and examined by myself. D/w Dr. Moody who performed the MUSTAPHA/DCCV earlier today. Pt had 9 seconds conversion pause immediately after external shock.  Rhythm has been stable, sinus in the 60-70s since then   - can go home on Toprol 75 mg daily, plus eliquis 5 mg bid and amiodarone 100 mg once a day without load  - f/u with me in 3 weeks with one week [facility] prior
Patient with SOB secondary to HFrEF, AF with RVR  Had MUSTAPHA yesterday for planned DCCV; showed possible dissection flap in aortic arch  S/p CTA, unchanged from 11/2020  Continue medications as above  Plan for DCCV Monday     Thank you for allowing me to participate in the care of this patient  Will continue to follow
Safe to resume oral anticoagulation per CT surgery. Will plan for DCCV on Monday.
seen and examined, agree with above  will defer DCCV till the MUSTAPHA findings are investigated bt CT and he is seen by CTS team   d/w Vignesh Butler, and Monisha from CTS

## 2021-06-28 NOTE — PROGRESS NOTE ADULT - SUBJECTIVE AND OBJECTIVE BOX
[place] [facility]                                                               Office: [place] [address]                                                              Telephone: [phone]. Fax:[phone]                                                                             PROGRESS NOTE  Reason for follow up: sob and palpitations  Overnight: No new events.   Update: 6/28: Pt denies chest pain, palpitations, SOB. Pt s/p MUSTAPHA/DCCV successfully converted to SR. Tele- SR HR @ 60s. Cont. ACE and BB. Plan for Toprol XL and Lasix PO prior to DC.     Subjective: No new events    	  Vitals:  T(C): 36.7 (06-28-21 @ 04:37), Max: 36.9 (06-27-21 @ 16:40)  HR: 77 (06-28-21 @ 04:37) (77 - 81)  BP: 104/59 (06-28-21 @ 04:37) (100/62 - 111/64)  RR: 18 (06-28-21 @ 04:37) (18 - 18)  SpO2: 98% (06-28-21 @ 04:37) (96% - 99%)    I&O's Summary    Weight (kg): 76 (06-24 @ 05:26)      PHYSICAL EXAM:  Appearance: Comfortable. No acute distress  HEENT:  Head and neck: Atraumatic. Normocephalic.  Normal oral mucosa, PERRL, Neck is supple. No JVD, No carotid bruit.   Neurologic: A & O x 3, no focal deficits. EOMI.  Lymphatic: No cervical lymphadenopathy  Cardiovascular: Normal S1 S2, No murmur, rubs/gallops. No JVD, No edema  Respiratory: Lungs clear to auscultation  Gastrointestinal:  Soft, Non-tender, + BS  Lower Extremities: No edema  Psychiatry: Patient is calm. No agitation. Mood & affect appropriate  Skin: No rashes/ ecchymoses/cyanosis/ulcers visualized on the face, hands or feet.      CURRENT MEDICATIONS:  furosemide   Injectable 40 milliGRAM(s) IV Push every 12 hours  losartan 25 milliGRAM(s) Oral daily  metoprolol tartrate 25 milliGRAM(s) Oral every 8 hours  metoprolol tartrate Injectable 5 milliGRAM(s) IV Push every 6 hours PRN  gabapentin  levETIRAcetam  atorvastatin  apixaban  ascorbic acid  magnesium oxide  multivitamin      DIAGNOSTIC TESTING:    [ ] Echocardiogram: < from: MUSTAPHA Echo Doppler (06.25.21 @ 17:45) >  Summary:   1. Technically good study.   2. Mildly decreased global left ventricular systolic function.   3. Left ventricular ejection fraction, by visual estimation, is 40 to 45%.   4. Left atrial enlargement.   5. Abnormal septal motion consistent with post-operative status.   6. The mitral in-flow pattern reveals no discernable A-wave, therefore no comment on diastolic function can be made.   7. Moderately reduced RV systolic function.   8. Normal right atrial size.   9. There is a mitral valve prosthesis with moderate para-valvular leak.  10. Mild tricuspid regurgitation.  11. There is a well seated aortic valve prosthesis. Acceleration time is 90 msec, suggesting normal function of aortic bioprosthesis.  12. Patient is s/p aortic root repair with type A dissection in 2010. Synthetic graft is seen in the ascending aorta. There is a dissection flap seen in the arch, at least involving the left brachiocephalic artery. There is a small true lumen and a larger false lumen which appears mostly thrombosed. There is connection between the true and false lumens with the probe is about 45 cm from the incisors.  13. Trivial pericardial effusion.  14. Images were reviewed with Dr. Virk.    OTHER: 	  CT:< from: CT Angio Chest Aorta w/wo IV Cont (06.25.21 @ 22:02) >    IMPRESSION:  1. Stable abdominal aortic aneurysm with ectasias of the infrarenal abdominal  aorta. Stable dissections of the common iliacarteries.  2. Mild wall thickening and fluid in distal small bowel and colon suggesting an  infectious or inflammatory enteritis.    < from: CT Angio Abdomen and Pelvis w/ IV Cont (06.25.21 @ 22:03) >    IMPRESSION:  Prior surgical repair of the ascending aortic dissection and persistent residual type B dissection extending inferiorly into the common iliac arteries bilaterally and the external iliac artery on the right side. Extension of dissection into the innominate, left subclavian and left renal arteries is also stable.    Mural thickening and mild to moderate dilatation of the right lower quadrant small bowel concerning for enteritis. Suggest clinical correlation.      LABS:	 	  CARDIAC MARKERS ( 26 Jun 2021 14:13 )  x     / x     / x     / x     / x      p-BNP 26 Jun 2021 14:13: 1395 pg/mL                          9.8    7.49  )-----------( 160      ( 28 Jun 2021 07:27 )             31.1     06-28    141  |  102  |  52.8<H>  ----------------------------<  94  3.5   |  25.0  |  3.23<H>    Ca    8.8      28 Jun 2021 07:27  Phos  1.4     06-26  Mg     2.5     06-28    TPro  7.1  /  Alb  4.1  /  TBili  0.9  /  DBili  x   /  AST  11  /  ALT  16  /  AlkPhos  95  06-27    proBNP: Serum Pro-Brain Natriuretic Peptide: 1395 pg/mL (06-26 @ 14:13)  Serum Pro-Brain Natriuretic Peptide: 3677 pg/mL (06-24 @ 06:43)      TSH: Thyroid Stimulating Hormone, Serum: 1.68 uIU/mL        TELEMETRY: SR HR @ 60s   	                                                                      [place] [facility]                                                               Office: [address] [address]                                                              Telephone: [phone]. Fax:[phone]                                                                             PROGRESS NOTE  Reason for follow up: sob and palpitations  Overnight: No new events.   Update: 6/28: Pt denies chest pain, palpitations, SOB. Pt s/p MUSTAPHA/DCCV successfully converted to SR. Tele- SR HR @ 60s. Cont. ACE and BB. Plan for Toprol XL and Lasix PO prior to DC.     Subjective: No new events    	  Vitals:  T(C): 36.7 (06-28-21 @ 04:37), Max: 36.9 (06-27-21 @ 16:40)  HR: 77 (06-28-21 @ 04:37) (77 - 81)  BP: 104/59 (06-28-21 @ 04:37) (100/62 - 111/64)  RR: 18 (06-28-21 @ 04:37) (18 - 18)  SpO2: 98% (06-28-21 @ 04:37) (96% - 99%)    I&O's Summary    Weight (kg): 76 (06-24 @ 05:26)      PHYSICAL EXAM:  Appearance: Comfortable. No acute distress  HEENT:  Head and neck: Atraumatic. Normocephalic.  Normal oral mucosa, PERRL, Neck is supple. No JVD, No carotid bruit.   Neurologic: A & O x 3, no focal deficits. EOMI.  Lymphatic: No cervical lymphadenopathy  Cardiovascular: Normal S1 S2, No murmur, rubs/gallops. No JVD, No edema  Respiratory: bibasilar rales with no clearance with cough  Gastrointestinal:  Soft, Non-tender, + BS  Lower Extremities: No edema  Psychiatry: Patient is calm. No agitation. Mood & affect appropriate  Skin: No rashes/ ecchymoses/cyanosis/ulcers visualized on the face, hands or feet.      CURRENT MEDICATIONS:  furosemide   Injectable 40 milliGRAM(s) IV Push every 12 hours  losartan 25 milliGRAM(s) Oral daily  metoprolol tartrate 25 milliGRAM(s) Oral every 8 hours  metoprolol tartrate Injectable 5 milliGRAM(s) IV Push every 6 hours PRN  gabapentin  levETIRAcetam  atorvastatin  apixaban  ascorbic acid  magnesium oxide  multivitamin      DIAGNOSTIC TESTING:    [ ] Echocardiogram: < from: MUSTAPHA Echo Doppler (06.25.21 @ 17:45) >  Summary:   1. Technically good study.   2. Mildly decreased global left ventricular systolic function.   3. Left ventricular ejection fraction, by visual estimation, is 40 to 45%.   4. Left atrial enlargement.   5. Abnormal septal motion consistent with post-operative status.   6. The mitral in-flow pattern reveals no discernable A-wave, therefore no comment on diastolic function can be made.   7. Moderately reduced RV systolic function.   8. Normal right atrial size.   9. There is a mitral valve prosthesis with moderate para-valvular leak.  10. Mild tricuspid regurgitation.  11. There is a well seated aortic valve prosthesis. Acceleration time is 90 msec, suggesting normal function of aortic bioprosthesis.  12. Patient is s/p aortic root repair with type A dissection in 2010. Synthetic graft is seen in the ascending aorta. There is a dissection flap seen in the arch, at least involving the left brachiocephalic artery. There is a small true lumen and a larger false lumen which appears mostly thrombosed. There is connection between the true and false lumens with the probe is about 45 cm from the incisors.  13. Trivial pericardial effusion.  14. Images were reviewed with Dr. Virk.    OTHER: 	  CT:< from: CT Angio Chest Aorta w/wo IV Cont (06.25.21 @ 22:02) >    IMPRESSION:  1. Stable abdominal aortic aneurysm with ectasias of the infrarenal abdominal  aorta. Stable dissections of the common iliacarteries.  2. Mild wall thickening and fluid in distal small bowel and colon suggesting an  infectious or inflammatory enteritis.    < from: CT Angio Abdomen and Pelvis w/ IV Cont (06.25.21 @ 22:03) >    IMPRESSION:  Prior surgical repair of the ascending aortic dissection and persistent residual type B dissection extending inferiorly into the common iliac arteries bilaterally and the external iliac artery on the right side. Extension of dissection into the innominate, left subclavian and left renal arteries is also stable.    Mural thickening and mild to moderate dilatation of the right lower quadrant small bowel concerning for enteritis. Suggest clinical correlation.      LABS:	 	  CARDIAC MARKERS ( 26 Jun 2021 14:13 )  x     / x     / x     / x     / x      p-BNP 26 Jun 2021 14:13: 1395 pg/mL                          9.8    7.49  )-----------( 160      ( 28 Jun 2021 07:27 )             31.1     06-28    141  |  102  |  52.8<H>  ----------------------------<  94  3.5   |  25.0  |  3.23<H>    Ca    8.8      28 Jun 2021 07:27  Phos  1.4     06-26  Mg     2.5     06-28    TPro  7.1  /  Alb  4.1  /  TBili  0.9  /  DBili  x   /  AST  11  /  ALT  16  /  AlkPhos  95  06-27    proBNP: Serum Pro-Brain Natriuretic Peptide: 1395 pg/mL (06-26 @ 14:13)  Serum Pro-Brain Natriuretic Peptide: 3677 pg/mL (06-24 @ 06:43)      TSH: Thyroid Stimulating Hormone, Serum: 1.68 uIU/mL        TELEMETRY: SR HR @ 60s

## 2021-06-28 NOTE — PROGRESS NOTE ADULT - SUBJECTIVE AND OBJECTIVE BOX
62 y/o M w/ PMHx ESRD on HD on Tu/Thur/Sat (missed scheduled dialysis on 06/22) ,  CAD s/p remote PCI, AT/Aflutter, s/p CABG for aortic insufficiency and mitral regurgitation s/p bioAVR and bioMVR,  HFrEF (EF 40%), ascending aortic dissection s/p surgery complicated by CVA with residual left-sided weakness, colon resection for bowel resection s/p ostomy with reversal, also hx HTN, presented to the ED with  c/o sob and palpitations x 3 days found to be in  symptomatic AFlutter RVR.       1- Atrial Fibrillation/Flutter with RVR - on BB & AC     2: aortic aneurysm: hx repair, chronic, no intervention,    3 Systolic CHF , chronic- Compensated w. UF, Lasix,     4- ESRD on HD ( [facility] )    5- CAD with PCI native heart , no angina   On  aspirin , plavix , statin & metoprolol     Cardiversion today,    Maintain K+ > 4 Meq/L    AM Labs - P;

## 2021-06-28 NOTE — CHART NOTE - NSCHARTNOTEFT_GEN_A_CORE
PA NOTE-MEDICINE    Called by RN due to Pt's HR decreasing into 40's while asleep but Rate Increases to 60's while awake and increases to 70's while walking.   Pt was cardioverted today from A-Fib to Sinus  EKG s/p cardioversion-Sinus HR-62  Pt is on Toprol XL (with Parameters) and received 25 mg po @ 17:00 today    Pt Tanesha: Dizziness SOB CP     T(C): 37 (28 Jun 2021 21:49), Max: 37.1 (28 Jun 2021 17:06)  T(F): 98.6 (28 Jun 2021 21:49), Max: 98.8 (28 Jun 2021 17:06)  HR: 52 (28 Jun 2021 21:49) (52 - 77)  BP: 109/56 (28 Jun 2021 21:49) (104/59 - 109/56)  RR: 18 (28 Jun 2021 21:49) (18 - 18)  SpO2: 94% (28 Jun 2021 21:49) (94% - 98%)    General: WDWN Male NAD     Cardiac: S1S2 + Suresh Reg Rhy  Lungs: CTA B/L   Integument: No palor warm/dry     A/P Eval Pt for Bradycardia while asleep Asymptomatic  EKG Stat-Sinus @ 53 No acute Findings (Unofficial)  BMP  MAG  PHOS Ordered     Continue to Monitor Pt   Call PA If Bradycardia Sustains or for any other changes in Pt status PA NOTE-MEDICINE    Called by RN due to Pt's HR decreasing into 40's while asleep but Rate Increases to 60's while awake and increases to 70's while walking.   Pt was cardioverted today from A-Fib to Sinus  EKG s/p cardioversion-Sinus HR-62  Pt is on Toprol XL (with Parameters) and received 25 mg po @ 17:00 today    Pt Tanesha: Dizziness SOB CP     T(C): 37 (28 Jun 2021 21:49), Max: 37.1 (28 Jun 2021 17:06)  T(F): 98.6 (28 Jun 2021 21:49), Max: 98.8 (28 Jun 2021 17:06)  HR: 52 (28 Jun 2021 21:49) (52 - 77)  BP: 109/56 (28 Jun 2021 21:49) (104/59 - 109/56)  RR: 18 (28 Jun 2021 21:49) (18 - 18)  SpO2: 94% (28 Jun 2021 21:49) (94% - 98%)    General: WDWN Male NAD     Cardiac: S1S2 + Suresh Reg Rhy  Lungs: CTA B/L   Integument: No palor warm/dry     A/P Eval Pt for Bradycardia while asleep Asymptomatic  EKG Stat-Sinus @ 53 No acute Findings (Unofficial)  BMP  MAG  PHOS Ordered     Continue to Monitor Pt   Call PA If Bradycardia Sustains or for any other changes in Pt status    Pt had additional 8 beats of V-tach  asymptomatic VSS  Awaiting Electrolytes  Continue to Monitor PA NOTE-MEDICINE    Called by RN due to Pt's HR decreasing into 40's while asleep but Rate Increases to 60's while awake and increases to 70's while walking.   Pt was cardioverted today from A-Fib to Sinus  EKG s/p cardioversion-Sinus HR-62  Pt is on Toprol XL (with Parameters) and received 25 mg po @ 17:00 today    Pt Tanesha: Dizziness SOB CP     T(C): 37 (28 Jun 2021 21:49), Max: 37.1 (28 Jun 2021 17:06)  T(F): 98.6 (28 Jun 2021 21:49), Max: 98.8 (28 Jun 2021 17:06)  HR: 52 (28 Jun 2021 21:49) (52 - 77)  BP: 109/56 (28 Jun 2021 21:49) (104/59 - 109/56)  RR: 18 (28 Jun 2021 21:49) (18 - 18)  SpO2: 94% (28 Jun 2021 21:49) (94% - 98%)    General: WDWN Male NAD     Cardiac: S1S2 + Suresh Reg Rhy  Lungs: CTA B/L   Integument: No palor warm/dry     A/P Eval Pt for Bradycardia while asleep Asymptomatic  EKG Stat-Sinus @ 53 No acute Findings (Unofficial)  BMP  MAG  PHOS Ordered     Continue to Monitor Pt   Call PA If Bradycardia Sustains or for any other changes in Pt status    Pt had additional 8 beats of V-tach  asymptomatic VSS  Awaiting Electrolytes  Continue to Monitor    Addendum    LABS:   LABS:                        9.8    7.49  )-----------( 160      ( 28 Jun 2021 07:27 )             31.1     06-29    139  |  99  |  58.3<H>  ----------------------------<  97  3.5   |  26.0  |  3.53<H>    Ca    8.7      29 Jun 2021 01:39  Phos  6.0     06-29  Mg     2.3     06-29    Gave Sevelamer 800 mg PO x 1     Continue to Monitor Pt

## 2021-06-29 ENCOUNTER — TRANSCRIPTION ENCOUNTER (OUTPATIENT)
Age: 61
End: 2021-06-29

## 2021-06-29 VITALS
SYSTOLIC BLOOD PRESSURE: 138 MMHG | RESPIRATION RATE: 18 BRPM | HEART RATE: 68 BPM | DIASTOLIC BLOOD PRESSURE: 68 MMHG | OXYGEN SATURATION: 98 % | TEMPERATURE: 98 F

## 2021-06-29 LAB
ALBUMIN SERPL ELPH-MCNC: 4.4 G/DL — SIGNIFICANT CHANGE UP (ref 3.3–5.2)
ANION GAP SERPL CALC-SCNC: 14 MMOL/L — SIGNIFICANT CHANGE UP (ref 5–17)
ANION GAP SERPL CALC-SCNC: 14 MMOL/L — SIGNIFICANT CHANGE UP (ref 5–17)
BUN SERPL-MCNC: 58.3 MG/DL — HIGH (ref 8–20)
BUN SERPL-MCNC: 63.5 MG/DL — HIGH (ref 8–20)
CALCIUM SERPL-MCNC: 8.7 MG/DL — SIGNIFICANT CHANGE UP (ref 8.6–10.2)
CALCIUM SERPL-MCNC: 8.9 MG/DL — SIGNIFICANT CHANGE UP (ref 8.6–10.2)
CHLORIDE SERPL-SCNC: 101 MMOL/L — SIGNIFICANT CHANGE UP (ref 98–107)
CHLORIDE SERPL-SCNC: 99 MMOL/L — SIGNIFICANT CHANGE UP (ref 98–107)
CO2 SERPL-SCNC: 25 MMOL/L — SIGNIFICANT CHANGE UP (ref 22–29)
CO2 SERPL-SCNC: 26 MMOL/L — SIGNIFICANT CHANGE UP (ref 22–29)
CREAT SERPL-MCNC: 3.49 MG/DL — HIGH (ref 0.5–1.3)
CREAT SERPL-MCNC: 3.53 MG/DL — HIGH (ref 0.5–1.3)
GLUCOSE SERPL-MCNC: 97 MG/DL — SIGNIFICANT CHANGE UP (ref 70–99)
GLUCOSE SERPL-MCNC: 97 MG/DL — SIGNIFICANT CHANGE UP (ref 70–99)
HCT VFR BLD CALC: 30.3 % — LOW (ref 39–50)
HGB BLD-MCNC: 9.7 G/DL — LOW (ref 13–17)
MAGNESIUM SERPL-MCNC: 2.3 MG/DL — SIGNIFICANT CHANGE UP (ref 1.6–2.6)
MCHC RBC-ENTMCNC: 30.2 PG — SIGNIFICANT CHANGE UP (ref 27–34)
MCHC RBC-ENTMCNC: 32 GM/DL — SIGNIFICANT CHANGE UP (ref 32–36)
MCV RBC AUTO: 94.4 FL — SIGNIFICANT CHANGE UP (ref 80–100)
PHOSPHATE SERPL-MCNC: 5.7 MG/DL — HIGH (ref 2.4–4.7)
PHOSPHATE SERPL-MCNC: 6 MG/DL — HIGH (ref 2.4–4.7)
PLATELET # BLD AUTO: 161 K/UL — SIGNIFICANT CHANGE UP (ref 150–400)
POTASSIUM SERPL-MCNC: 3.2 MMOL/L — LOW (ref 3.5–5.3)
POTASSIUM SERPL-MCNC: 3.5 MMOL/L — SIGNIFICANT CHANGE UP (ref 3.5–5.3)
POTASSIUM SERPL-SCNC: 3.2 MMOL/L — LOW (ref 3.5–5.3)
POTASSIUM SERPL-SCNC: 3.5 MMOL/L — SIGNIFICANT CHANGE UP (ref 3.5–5.3)
RBC # BLD: 3.21 M/UL — LOW (ref 4.2–5.8)
RBC # FLD: 13.3 % — SIGNIFICANT CHANGE UP (ref 10.3–14.5)
SODIUM SERPL-SCNC: 139 MMOL/L — SIGNIFICANT CHANGE UP (ref 135–145)
SODIUM SERPL-SCNC: 140 MMOL/L — SIGNIFICANT CHANGE UP (ref 135–145)
WBC # BLD: 7.73 K/UL — SIGNIFICANT CHANGE UP (ref 3.8–10.5)
WBC # FLD AUTO: 7.73 K/UL — SIGNIFICANT CHANGE UP (ref 3.8–10.5)

## 2021-06-29 PROCEDURE — 36415 COLL VENOUS BLD VENIPUNCTURE: CPT

## 2021-06-29 PROCEDURE — 86706 HEP B SURFACE ANTIBODY: CPT

## 2021-06-29 PROCEDURE — 84484 ASSAY OF TROPONIN QUANT: CPT

## 2021-06-29 PROCEDURE — 96375 TX/PRO/DX INJ NEW DRUG ADDON: CPT

## 2021-06-29 PROCEDURE — 80053 COMPREHEN METABOLIC PANEL: CPT

## 2021-06-29 PROCEDURE — 85027 COMPLETE CBC AUTOMATED: CPT

## 2021-06-29 PROCEDURE — 80048 BASIC METABOLIC PNL TOTAL CA: CPT

## 2021-06-29 PROCEDURE — 80069 RENAL FUNCTION PANEL: CPT

## 2021-06-29 PROCEDURE — 83735 ASSAY OF MAGNESIUM: CPT

## 2021-06-29 PROCEDURE — 96374 THER/PROPH/DIAG INJ IV PUSH: CPT

## 2021-06-29 PROCEDURE — 85730 THROMBOPLASTIN TIME PARTIAL: CPT

## 2021-06-29 PROCEDURE — 90937 HEMODIALYSIS REPEATED EVAL: CPT

## 2021-06-29 PROCEDURE — 71275 CT ANGIOGRAPHY CHEST: CPT

## 2021-06-29 PROCEDURE — 74174 CTA ABD&PLVS W/CONTRAST: CPT

## 2021-06-29 PROCEDURE — 71045 X-RAY EXAM CHEST 1 VIEW: CPT

## 2021-06-29 PROCEDURE — 87340 HEPATITIS B SURFACE AG IA: CPT

## 2021-06-29 PROCEDURE — 93312 ECHO TRANSESOPHAGEAL: CPT

## 2021-06-29 PROCEDURE — 83880 ASSAY OF NATRIURETIC PEPTIDE: CPT

## 2021-06-29 PROCEDURE — 86769 SARS-COV-2 COVID-19 ANTIBODY: CPT

## 2021-06-29 PROCEDURE — 83874 ASSAY OF MYOGLOBIN: CPT

## 2021-06-29 PROCEDURE — 84443 ASSAY THYROID STIM HORMONE: CPT

## 2021-06-29 PROCEDURE — 0225U NFCT DS DNA&RNA 21 SARSCOV2: CPT

## 2021-06-29 PROCEDURE — 85610 PROTHROMBIN TIME: CPT

## 2021-06-29 PROCEDURE — 87635 SARS-COV-2 COVID-19 AMP PRB: CPT

## 2021-06-29 PROCEDURE — 93325 DOPPLER ECHO COLOR FLOW MAPG: CPT

## 2021-06-29 PROCEDURE — 84100 ASSAY OF PHOSPHORUS: CPT

## 2021-06-29 PROCEDURE — 93005 ELECTROCARDIOGRAM TRACING: CPT

## 2021-06-29 PROCEDURE — 83690 ASSAY OF LIPASE: CPT

## 2021-06-29 PROCEDURE — 93320 DOPPLER ECHO COMPLETE: CPT

## 2021-06-29 PROCEDURE — 99285 EMERGENCY DEPT VISIT HI MDM: CPT | Mod: 25

## 2021-06-29 PROCEDURE — 99261: CPT

## 2021-06-29 PROCEDURE — 99239 HOSP IP/OBS DSCHRG MGMT >30: CPT

## 2021-06-29 PROCEDURE — 85025 COMPLETE CBC W/AUTO DIFF WBC: CPT

## 2021-06-29 RX ORDER — GABAPENTIN 400 MG/1
1 CAPSULE ORAL
Qty: 0 | Refills: 0 | DISCHARGE
Start: 2021-06-29

## 2021-06-29 RX ORDER — AMIODARONE HYDROCHLORIDE 400 MG/1
100 TABLET ORAL DAILY
Refills: 0 | Status: DISCONTINUED | OUTPATIENT
Start: 2021-06-29 | End: 2021-06-29

## 2021-06-29 RX ORDER — AMIODARONE HYDROCHLORIDE 400 MG/1
0.5 TABLET ORAL
Qty: 15 | Refills: 0
Start: 2021-06-29 | End: 2021-07-28

## 2021-06-29 RX ORDER — METOPROLOL TARTRATE 50 MG
1 TABLET ORAL
Qty: 0 | Refills: 0 | DISCHARGE

## 2021-06-29 RX ORDER — METOPROLOL TARTRATE 50 MG
1 TABLET ORAL
Qty: 30 | Refills: 0
Start: 2021-06-29 | End: 2021-07-28

## 2021-06-29 RX ORDER — SEVELAMER CARBONATE 2400 MG/1
800 POWDER, FOR SUSPENSION ORAL ONCE
Refills: 0 | Status: COMPLETED | OUTPATIENT
Start: 2021-06-29 | End: 2021-06-29

## 2021-06-29 RX ORDER — ATORVASTATIN CALCIUM 80 MG/1
1 TABLET, FILM COATED ORAL
Qty: 0 | Refills: 0 | DISCHARGE
Start: 2021-06-29

## 2021-06-29 RX ORDER — LOSARTAN POTASSIUM 100 MG/1
1 TABLET, FILM COATED ORAL
Qty: 30 | Refills: 0
Start: 2021-06-29 | End: 2021-07-28

## 2021-06-29 RX ORDER — SEVELAMER CARBONATE 2400 MG/1
1600 POWDER, FOR SUSPENSION ORAL
Refills: 0 | Status: DISCONTINUED | OUTPATIENT
Start: 2021-06-29 | End: 2021-06-29

## 2021-06-29 RX ORDER — LEVETIRACETAM 250 MG/1
1 TABLET, FILM COATED ORAL
Qty: 0 | Refills: 0 | DISCHARGE
Start: 2021-06-29

## 2021-06-29 RX ADMIN — LEVETIRACETAM 750 MILLIGRAM(S): 250 TABLET, FILM COATED ORAL at 05:18

## 2021-06-29 RX ADMIN — Medication 1 TABLET(S): at 11:50

## 2021-06-29 RX ADMIN — LOSARTAN POTASSIUM 25 MILLIGRAM(S): 100 TABLET, FILM COATED ORAL at 05:18

## 2021-06-29 RX ADMIN — Medication 500 MILLIGRAM(S): at 11:50

## 2021-06-29 RX ADMIN — SEVELAMER CARBONATE 1600 MILLIGRAM(S): 2400 POWDER, FOR SUSPENSION ORAL at 11:50

## 2021-06-29 RX ADMIN — Medication 40 MILLIGRAM(S): at 05:18

## 2021-06-29 RX ADMIN — APIXABAN 5 MILLIGRAM(S): 2.5 TABLET, FILM COATED ORAL at 11:50

## 2021-06-29 RX ADMIN — MAGNESIUM OXIDE 400 MG ORAL TABLET 400 MILLIGRAM(S): 241.3 TABLET ORAL at 11:50

## 2021-06-29 RX ADMIN — AMIODARONE HYDROCHLORIDE 100 MILLIGRAM(S): 400 TABLET ORAL at 11:50

## 2021-06-29 RX ADMIN — SEVELAMER CARBONATE 800 MILLIGRAM(S): 2400 POWDER, FOR SUSPENSION ORAL at 05:18

## 2021-06-29 NOTE — PROGRESS NOTE ADULT - ASSESSMENT
60 y/o M w/ PMHx ESRD on HD on Tu/Thur/Sat (missed scheduled dialysis on 06/22) ,  CAD s/p remote PCI, AT/Aflutter, s/p CABG for aortic insufficiency and mitral regurgitation s/p bioAVR and bioMVR,  HFrEF (EF 40%), ascending aortic dissection s/p surgery complicated by CVA with residual left-sided weakness, colon resection for bowel resection s/p ostomy with reversal, also hx HTN, presented to the ED with  c/o sob and palpitations x 3 days found to be in  symptomatic AFlutter RVR.       1- Atrial Fibrillation/Flutter with RVR   cont metoprolol, eliquis   cardioversion monday    2: aortic aneurysm: hx repair, chronic, no intervention by ct surgery    ok to restart a/c     3 Systolic CHF , chronic   HD per renal     4- ESRD on HD   nephrology consult appreciated   HD per schedule     5- CAD with PCI native heart , no angina   cont aspirin , plavix , statin ,metoprolol     dc once cleared by cardiology  
60 y/o M w/ PMHx ESRD on HD on Tu/Thur/Sat (missed scheduled dialysis on 06/22) ,  CAD s/p remote PCI, AT/Aflutter, s/p CABG for aortic insufficiency and mitral regurgitation s/p bioAVR and bioMVR,  HFrEF (EF 40%), ascending aortic dissection s/p surgery complicated by CVA with residual left-sided weakness, colon resection for bowel resection s/p ostomy with reversal, also hx HTN, presented to the ED with  c/o sob and palpitations x 3 days found to be in  symptomatic AFlutter RVR.       1- Atrial Fibrillation/Flutter with RVR   cont metoprolol, eliquis   cardioversion monday    2: aortic aneurysm: hx repair, chronic, no intervention by ct surgery    ok to restart a/c     3 Systolic CHF , chronic   HD per renal     4- ESRD on HD   nephrology consult appreciated   HD per schedule     5- CAD with PCI native heart , no angina   cont aspirin , plavix , statin ,metoprolol     dc once cleared by cardiology;  likely after HD session tues  
60 y/o M w/ PMHx ESRD on HD on Tu/Thur/Sat (missed scheduled dialysis on 06/22) ,  CAD s/p remote PCI, AT/Aflutter, s/p CABG for aortic insufficiency and mitral regurgitation s/p bioAVR and bioMVR,  HFrEF (EF 40%), ascending aortic dissection s/p surgery complicated by CVA with residual left-sided weakness, colon resection for bowel resection s/p ostomy with reversal, also hx HTN, presented to the ED with  c/o sob and palpitations x 3 days found to be in  symptomatic AFlutter RVR.       1- Atrial Fibrillation/Flutter with RVR  s/p cardioversion 6/28  cont metoprolol, eliquis     2: aortic aneurysm: hx repair, chronic, no intervention by ct surgery    ok to restart a/c     3 Systolic CHF , chronic   HD per renal     4- ESRD on HD   nephrology consult appreciated   HD per schedule     5- CAD with PCI native heart , no angina   cont aspirin , plavix , statin ,metoprolol     dc in am post HD   
62 y/o M w/ PMHx ESRD on HD on Tu/Thur/Sat (missed scheduled dialysis on 06/22) ,  CAD s/p remote PCI, AT/Aflutter, s/p CABG for aortic insufficiency and mitral regurgitation s/p bioAVR and bioMVR,  HFrEF (EF 40%), ascending aortic dissection s/p surgery complicated by CVA with residual left-sided weakness, colon resection for bowel resection s/p ostomy with reversal, also hx HTN, presented to the ED with  c/o sob and palpitations x 3 days found to be in  symptomatic AFlutter RVR.       1- Atrial Fibrillation/Flutter with RVR   cont metoprolol, eliquis   HR is better   cardiology input appreciated , for MUSTAPHA cardioversion today     2-Systolic CHF , cronic decompansated   cont HD for fluid removal     3- ESRD on HD   nephrology consult appreciated   HD per schedule     4- CAD with PCI native heart , no angina     cont aspirin , plavix , statin ,metoprolol     home in 1-2 days likely   
60 y/o M with a PMHx of Cocaine abuse (quit 2010), CAD s/p remote stent to LAD and recent CABG x 2 (DGV-OM, RPDA with Dr. Butler 11/2020), AI and MR s/p tAVR, tMVR (11/2020 Dr. Butler) with post-op Afib/flutter(on eliquis), HFrEF (EF 45-50% 02/21), Aortic dissection s/p emergent repair (2010), surgery complicated by CVA w/ residual left sided weakness and bowel ischemia s/p bowel resection and ostomy with reversal repair, Covid infection, ESRD on HD (Tu/Thur/Sat) s/p LUE AVF, HTN, seizure disorder, GIB s/p colonoscopy and AVMs cauterization 3/2021, and psoriasis who presented to the ER with complaints of shortness of breath and palpitations x 3 days found to be fluid overloaded with symptomatic atrial flutter w/ RVR. EP called for arrythmia management.    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  Pt with a known chronic type B dissection noted on preop CT scan (11/2020), however no mention of issues in the ascending aorta. Findings d/w Dr Harry and CTS to evaluate patient. Pt is hemodynamically stable.       Recommendations:  Continue rate control for now with Metoprolol  CTS/Dr Butler aware and will evaluate patient  Hold anticoagulation for now       
62 y/o M with a PMH:  of Cocaine abuse (quit 2010), CAD s/p remote stent to LAD and recent CABG x 2 ( DGV -OM, RPDA with Dr. Butler 11/2020), AI and MR s/p TAVR, TMVR (11/2020 ) with post-op Afib/flutter(on Eliquis HFrEF (EF 45-50% 02/21), Aortic dissection s/p emergent repair (2010), surgery complicated by CVA w/ residual left sided weakness and bowel ischemia s/p bowel resection and ostomy with reversal repair, Covid infection, ESRD on HD (Tu/Thur/Sat) s/p LUE AVF, HTN, seizure disorder, GIB s/p colonoscopy and AVMs cauterization 3/2021, and psoriasis who presented to the ER with complaints of shortness of breath and palpitations x 3 days found to be fluid overloaded with symptomatic atrial flutter w/ RVR.     Interval Hx: Pt scheduled for MUSTAPHA/ DCCV yesterday , however, during MUSTAPHA, patient with incidental finding of a possible ascending aortic root aneurysm in the setting of a prior Type A dissection repair in 2010.      Pt with a known chronic type B dissection noted on preop CT scan (11/2020), however no mention of issues in the ascending aorta.     Pt is hemodynamically stable.     Rate control  with Metoprolol    Anticoagulation - On Hold,     Patient was seen and evaluated on dialysis.   Patient is tolerating the procedure well.   T(C): 36.8 (06-26-21 @ 08:14), Max: 36.8 (06-26-21 @ 08:14)  HR: 81 (06-26-21 @ 08:14) (81 - 111)  BP: 145/85 (06-26-21 @ 08:14) (124/74 - 145/85)  Continue dialysis: T Th S,   Dialyzer: Revaclear 300         QB: 400 ml.,        QD: 500ml.,  Goal UF  2 L  over  3.5  Hours     
A/P:  60 y/o M with a PMHx of CAD s/p CABG x 2, AI and MR s/p tAVR, tMVR, HFrEF (EF 45-50% 02/21), ascending aortic dissection s/p repair c/b by CVA with residual right sided weakness, Afib/flutter (on coumadin), Covid, ESRD on HD (Tu/Thur/Sat) s/p LUE AVF, colon resection s/p ostomy with reversal, HTN, seizure disorder, and psoriasis who presented to the ER with complaints of shortness of breath and palpitations. Patient states that he was supposed to go to HD on Tuesday, but he was experiencing palpitations and felt his heart going fast so he didn't feel safe driving. Patient states that the following 2 days he began to have worsening shortness of breath and orthopnea, so he came to the ER to be evaluated. Patient was recently restarted on amiodarone 200mg PO qday for rate control of his Afib, as patient often with hypotension with HD making increasing AV nodals difficult. Patient also notes some severe leg cramping during HD as well. Patient denies fevers, chills, syncope, chest pressure, abdominal pain, N/V/D, headache, or dizziness.     6/25: Pt denies chest pain, palpitations, SOB. Tele- Afib with PVCs 80-90s. Pt planned for MUSTAPHA today 6/25.     Acute Decompensated HFrEF EF 40%  - Decompensated due to missed HD session  - HD yesterday 6/24 for fluid removal.   - Continue metoprolol 25mg PO q8.   - Patient still making urine, continue Lasix 40mg IV BID.   - Added losartan 25mg PO qday for GDMT.   - Monitor on telemetry.    - Strict i/o and daily weights.   -  Keep K > 4, Mg > 2.        Atrial Fibrillation with RVR  - Cont. metoprolol 25mg PO q8 with IV PRN for sustained RVR.  - EP consult appreciated  - Started on Eliquis 5mg PO BID>  - Plan for MUSTAPHA/DCCV 6/28  - Was supposed to be started on Amiodarone 200mg PO qday per last office note, but patient never started.   - Continue telemetry monitoring     CAD  - S/p CABG x 2   - Continue aspirin, statin, and metoprolol     History of Aortic Dissection  - CTA evaluated by Dr. Fisher and andrey Mcfadden.   - BP control as above.   - No interventions at this time.     Assessment and recommendations are final when note is signed by the attending.       
A/P:  60 y/o M with a PMHx of CAD s/p CABG x 2, AI and MR s/p tAVR, tMVR, HFrEF (EF 45-50% 02/21), ascending aortic dissection s/p repair c/b by CVA with residual right sided weakness, Afib/flutter (on coumadin), Covid, ESRD on HD (Tu/Thur/Sat) s/p LUE AVF, colon resection s/p ostomy with reversal, HTN, seizure disorder, and psoriasis who presented to the ER with complaints of shortness of breath and palpitations. Patient states that he was supposed to go to HD on Tuesday, but he was experiencing palpitations and felt his heart going fast so he didn't feel safe driving. Patient states that the following 2 days he began to have worsening shortness of breath and orthopnea, so he came to the ER to be evaluated. Patient was recently restarted on amiodarone 200mg PO qday for rate control of his Afib, as patient often with hypotension with HD making increasing AV nodals difficult. Patient also notes some severe leg cramping during HD as well. Patient denies fevers, chills, syncope, chest pressure, abdominal pain, N/V/D, headache, or dizziness.     6/28: Pt denies chest pain, palpitations, SOB. Pt s/p MUSTAPHA/DCCV successfully converted to SR. Tele- SR HR @ 60s. Cont. ACE and BB. Plan for Toprol XL and Lasix PO prior to DC.     Acute Decompensated HFrEF EF 40%  - Decompensated due to missed HD session  - Cont. HD    - Cont. ACE and BB  - Lasix 40mg IVP daily, will transition to PO prior to DC  - Monitor on telemetry     - Cont. Strict i/o and daily weights  -  Keep K > 4, Mg > 2       Atrial Fibrillation with RVR  -Pt s/p DCCV-->converted to SR  -Tele NSR HR @ 60s  -Pt converted to Toprol XL 75mg  - EP consult appreciated  - Continue Eliquis 5mg PO BID   - Continue telemetry monitoring     CAD  - S/p CABG x 2   - Continue aspirin, statin, and metoprolol     History of Aortic Dissection  - CTA evaluated by Dr. Fisher and Dr. Lutz, stable  - BP control as above.   - No interventions at this time       
1) ESRD on HD  2) Acute decompensated HFrEF  3) Afib with RVR  4) Anemia of CKD    HD TTS schedule;  HD in AM  Hb low; REINALDO with HD  
A/P:  60 y/o M with a PMHx of CAD s/p CABG x 2, AI and MR s/p tAVR, tMVR, HFrEF (EF 45-50% 02/21), ascending aortic dissection s/p repair c/b by CVA with residual right sided weakness, Afib/flutter (on coumadin), Covid, ESRD on HD (Tu/Thur/Sat) s/p LUE AVF, colon resection s/p ostomy with reversal, HTN, seizure disorder, and psoriasis who presented to the ER with complaints of shortness of breath and palpitations. Patient states that he was supposed to go to HD on Tuesday, but he was experiencing palpitations and felt his heart going fast so he didn't feel safe driving. Patient states that the following 2 days he began to have worsening shortness of breath and orthopnea, so he came to the ER to be evaluated. Patient was recently restarted on amiodarone 200mg PO qday for rate control of his Afib, as patient often with hypotension with HD making increasing AV nodals difficult. Patient also notes some severe leg cramping during HD as well. Patient denies fevers, chills, syncope, chest pressure, abdominal pain, N/V/D, headache, or dizziness.     6/25: Pt denies chest pain, palpitations, SOB. Tele- Afib with PVCs 80-90s. Pt planned for MUSTAPHA today 6/25.     Acute Decompensated HFrEF EF 40%  - Decompensated due to missed HD session  - HD yesterday 6/24 for fluid removal.   - Continue metoprolol 25mg PO BID  -Pt currently euvolemic on exam  - Monitor on telemetry  - Strict i/o and daily weights    Atrial Fibrillation with RVR  - Cont. metoprolol 25mg PO BID  - EP consult appreciated  - Plan for MUSTAPHA/DCCV today 6/25  - Was supposed to be started on Amiodarone 200mg PO qday per last office note, but patient never started.   - HD yesterday 6/24 for fluid removal  - Continue telemetry monitoring     CAD  - S/p CABG x 2   - Continue aspirin, statin, and metoprolol     
A/P:  60 y/o M with a PMHx of CAD s/p CABG x 2, AI and MR s/p tAVR, tMVR, HFrEF (EF 45-50% 02/21), ascending aortic dissection s/p repair c/b by CVA with residual right sided weakness, Afib/flutter (on coumadin), Covid, ESRD on HD (Tu/Thur/Sat) s/p LUE AVF, colon resection s/p ostomy with reversal, HTN, seizure disorder, and psoriasis who presented to the ER with complaints of shortness of breath and palpitations. Patient states that he was supposed to go to HD on Tuesday, but he was experiencing palpitations and felt his heart going fast so he didn't feel safe driving. Patient states that the following 2 days he began to have worsening shortness of breath and orthopnea, so he came to the ER to be evaluated. Patient was recently restarted on amiodarone 200mg PO qday for rate control of his Afib, as patient often with hypotension with HD making increasing AV nodals difficult. Patient also notes some severe leg cramping during HD as well. Patient denies fevers, chills, syncope, chest pressure, abdominal pain, N/V/D, headache, or dizziness.     Acute Decompensated HFrEF EF 40%  - Decompensated due to missed HD session  - HD yesterday 6/26 for fluid removal.   - Continue metoprolol 25mg PO q8, will transition to Toprol XL s/p DCCV.  - Patient still making urine, continue Lasix 40mg IV BID. Will send home on PO Lasix  - Added losartan 25mg PO qday for GDMT, tolerating well.   - Monitor on telemetry.    - Strict i/o and daily weights.   -  Keep K > 4, Mg > 2.        Atrial Fibrillation with RVR  - Cont. metoprolol 25mg PO q8 with IV PRN for sustained RVR.  - EP consult appreciated  - Continue Eliquis 5mg PO BID.  - Plan for MUSTAPHA/DCCV 6/28  - NPO after midnight.   - Continue telemetry monitoring     CAD  - S/p CABG x 2   - Continue aspirin, statin, and metoprolol     History of Aortic Dissection  - CTA evaluated by Dr. Fisher and andrey Mcfadden.   - BP control as above.   - No interventions at this time.     Assessment and recommendations are final when note is signed by the attending.   
D.C IV Lasix, on 4 K + dialysate,     Euvolemic,     Torsemide on Non Dialysis days,    P+ Binders,    Maintain K+ > 4 Meq., & Mg ++ > 2 mg.,     Will discuss w. cardiology  re : Kidney Transplant Eligibility
HD On Tuesday,     Cardioversion in AM,     No Dialysis - Related cramps, w. Yesterday's Treatment, 
1- Atrial Fibrillation/Flutter with RVR - on BB & AC     2: aortic aneurysm: hx repair, chronic, no intervention,    3 Systolic CHF , chronic- Compensated w. UF, Lasix,     4- ESRD on HD ( T Th S )    5- CAD with PCI native heart , no angina   On  aspirin , plavix , statin & metoprolol     Cardiversion today,    Maintain K+ > 4 Meq/L    AM Labs - P;

## 2021-06-29 NOTE — PROGRESS NOTE ADULT - REASON FOR ADMISSION
sob and palpitations x 3 days

## 2021-06-29 NOTE — PROGRESS NOTE ADULT - SUBJECTIVE AND OBJECTIVE BOX
[facility] DIVISION OF KIDNEY DISEASES AND HYPERTENSION -- HEMODIALYSIS NOTE  --------------------------------------------------------------------------------  Chief Complaint: ESRD/Ongoing hemodialysis requirement    24 hour events/subjective: S/P cardioversion,     PAST HISTORY  --------------------------------------------------------------------------------  No significant changes to PMH, PSH, FHx, SHx, unless otherwise noted    ALLERGIES & MEDICATIONS  --------------------------------------------------------------------------------  Allergies    penicillin (Other)    Intolerances-None,     Standing Inpatient Medications  apixaban 5 milliGRAM(s) Oral <User Schedule>  ascorbic acid 500 milliGRAM(s) Oral daily  atorvastatin 40 milliGRAM(s) Oral at bedtime  gabapentin 100 milliGRAM(s) Oral at bedtime  levETIRAcetam 750 milliGRAM(s) Oral two times a day  losartan 25 milliGRAM(s) Oral daily  magnesium oxide 400 milliGRAM(s) Oral three times a day with meals  metoprolol succinate ER 75 milliGRAM(s) Oral daily  multivitamin 1 Tablet(s) Oral daily  sevelamer carbonate 1600 milliGRAM(s) Oral three times a day with meals  torsemide 20 milliGRAM(s) Oral <User Schedule>    PRN Inpatient Medications  metoprolol tartrate Injectable 5 milliGRAM(s) IV Push every 6 hours PRN    REVIEW OF SYSTEMS  --------------------------------------------------------------------------------  Gen: No weight changes, fatigue, fevers/chills, weakness  Skin: No rashes  Head/Eyes/Ears/Mouth: No headache; Normal hearing; Normal vision w/o blurriness; No sinus pain/discomfort, sore throat  Respiratory: No dyspnea, cough, wheezing, hemoptysis  CV: No chest pain, PND, orthopnea  GI: No abdominal pain, diarrhea, constipation, nausea, vomiting, melena, hematochezia  : No increased frequency, dysuria, hematuria, nocturia  MSK: No joint pain/swelling; no back pain; no edema  Neuro: No dizziness/lightheadedness, weakness, seizures, numbness, tingling  Heme: No easy bruising or bleeding  Endo: No heat/cold intolerance  Psych: No significant nervousness, anxiety, stress, depression    All other systems were reviewed and are negative, except as noted.    VITALS/PHYSICAL EXAM  --------------------------------------------------------------------------------  T(C): 36.9 (06-29-21 @ 07:40), Max: 37.1 (06-28-21 @ 17:06)  HR: 70 (06-29-21 @ 07:40) (52 - 70)  BP: 128/78 (06-29-21 @ 07:40) (107/64 - 128/78)  RR: 18 (06-29-21 @ 07:40) (18 - 18)  SpO2: 100% (06-29-21 @ 07:40) (92% - 100%)  Wt(kg): -- See Flow Sheet,     Physical Exam:  	Gen: NAD, well-appearing, Pale,   	HEENT: PERRL, supple neck, clear oropharynx  	Pulm: CTA B/L  	CV: RRR, S1S2; no rub  	Back: No spinal or CVA tenderness; no sacral edema  	Abd: +BS, soft, nontender/nondistended  	: No suprapubic tenderness  	UE: Warm, FROM, no clubbing, intact strength; no edema; no asterixis  	LE: Warm, FROM, no clubbing, intact strength; no edema  	Neuro: No focal deficits, intact gait  	Psych: Normal affect and mood  	Skin: Warm, without rashes  	Vascular access: AVF    LABS/STUDIES  --------------------------------------------------------------------------------              9.7    7.73  >-----------<  161      [06-29-21 @ 05:56]              30.3     140  |  101  |  63.5  ----------------------------<  97      [06-29-21 @ 05:56]  3.2   |  25.0  |  3.49        Ca     8.9     [06-29-21 @ 05:56]      Mg     2.3     [06-29-21 @ 01:39]      Phos  5.7     [06-29-21 @ 05:56]    TPro  x   /  Alb  4.4  /  TBili  x   /  DBili  x   /  AST  x   /  ALT  x   /  AlkPhos  x   [06-29-21 @ 05:56]    Iron 29, TIBC 227, %sat 13      [12-05-20 @ 08:05]  Ferritin 1099      [12-05-20 @ 08:05]  TSH 1.68      [06-25-21 @ 07:34]  Lipid: chol --, , HDL --, LDL --      [11-15-20 @ 02:40]    HBsAb <3.0      [06-25-21 @ 02:07]  HBsAg Nonreact      [06-25-21 @ 02:07]

## 2021-06-29 NOTE — PROGRESS NOTE ADULT - NSICDXPILOT_GEN_ALL_CORE
[place]

## 2021-06-29 NOTE — DISCHARGE NOTE NURSING/CASE MANAGEMENT/SOCIAL WORK - NSDCVIVACCINE_GEN_ALL_CORE_FT
influenza, injectable, quadrivalent, preservative free; 12-Dec-2020 09:28; Lynn Gerardo); "[employer] [id number] (Exp. Date: 30-Jun-2021); IntraMuscular; Deltoid Right.; 0.5 milliLiter(s); VIS (VIS Published: 15-Aug-2019, VIS Presented: 12-Dec-2020);

## 2021-06-29 NOTE — PROGRESS NOTE ADULT - PROVIDER SPECIALTY LIST ADULT
Cardiology
Nephrology
Nephrology
Cardiology
Cardiology
Hospitalist
Nephrology
Nephrology
Electrophysiology
Electrophysiology
Hospitalist
Hospitalist
Nephrology
Nephrology
Hospitalist
Cardiology
Electrophysiology

## 2021-06-29 NOTE — DISCHARGE NOTE NURSING/CASE MANAGEMENT/SOCIAL WORK - PATIENT PORTAL LINK FT
You can access the FollowMyHealth Patient Portal offered by [facility] by registering at the following website: [email or web address]. By joining [employer]’s FollowMyHealth portal, you will also be able to view your health information using other applications (apps) compatible with our system.

## 2021-07-08 ENCOUNTER — NON-APPOINTMENT (OUTPATIENT)
Age: 61
End: 2021-07-08

## 2021-07-08 ENCOUNTER — APPOINTMENT (OUTPATIENT)
Dept: CARDIOLOGY | Facility: CLINIC | Age: 61
End: 2021-07-08
Payer: MEDICARE

## 2021-07-08 VITALS
TEMPERATURE: 98.3 F | DIASTOLIC BLOOD PRESSURE: 60 MMHG | HEART RATE: 54 BPM | RESPIRATION RATE: 16 BRPM | SYSTOLIC BLOOD PRESSURE: 132 MMHG | HEIGHT: 67 IN | BODY MASS INDEX: 29.03 KG/M2 | WEIGHT: 185 LBS | OXYGEN SATURATION: 100 %

## 2021-07-08 PROCEDURE — 99215 OFFICE O/P EST HI 40 MIN: CPT

## 2021-07-08 PROCEDURE — 93000 ELECTROCARDIOGRAM COMPLETE: CPT

## 2021-07-08 RX ORDER — CHLORHEXIDINE GLUCONATE 4 %
400 (240 MG) LIQUID (ML) TOPICAL DAILY
Refills: 0 | Status: DISCONTINUED | COMMUNITY
End: 2021-07-08

## 2021-07-08 RX ORDER — MULTIVIT-MIN/FOLIC/VIT K/LYCOP 400-300MCG
500 TABLET ORAL DAILY
Refills: 0 | Status: DISCONTINUED | COMMUNITY
End: 2021-07-08

## 2021-07-08 RX ORDER — METOPROLOL TARTRATE 25 MG/1
25 TABLET, FILM COATED ORAL TWICE DAILY
Qty: 60 | Refills: 0 | Status: DISCONTINUED | COMMUNITY
End: 2021-07-08

## 2021-07-08 RX ORDER — GABAPENTIN 100 MG/1
100 CAPSULE ORAL
Qty: 30 | Refills: 4 | Status: DISCONTINUED | COMMUNITY
End: 2021-07-08

## 2021-07-19 ENCOUNTER — NON-APPOINTMENT (OUTPATIENT)
Age: 61
End: 2021-07-19

## 2021-07-19 ENCOUNTER — APPOINTMENT (OUTPATIENT)
Dept: ELECTROPHYSIOLOGY | Facility: CLINIC | Age: 61
End: 2021-07-19
Payer: MEDICARE

## 2021-07-19 VITALS
SYSTOLIC BLOOD PRESSURE: 152 MMHG | TEMPERATURE: 98 F | OXYGEN SATURATION: 100 % | DIASTOLIC BLOOD PRESSURE: 65 MMHG | BODY MASS INDEX: 30.29 KG/M2 | WEIGHT: 193 LBS | HEART RATE: 62 BPM | HEIGHT: 67 IN

## 2021-07-19 PROCEDURE — 99215 OFFICE O/P EST HI 40 MIN: CPT

## 2021-07-19 PROCEDURE — 93000 ELECTROCARDIOGRAM COMPLETE: CPT

## 2021-07-19 NOTE — PROGRESS NOTE ADULT - SUBJECTIVE AND OBJECTIVE BOX
[facility], [facility]                                                    [address] , [address], [place] [address]                                                         PHONE: [phone]    FAX: [phone] [address], [address], [place] [address]                                                 PHONE: [phone]    FAX: [phone]  *******************************************************************************    Overnight events/Subjective Assessment:    INTERPRETATION OF TELEMETRY (personally reviewed):    penicillin (Unknown)    MEDICATIONS  (STANDING):  aspirin enteric coated 81 milliGRAM(s) Oral daily  atorvastatin 40 milliGRAM(s) Oral at bedtime  chlorhexidine 2% Cloths 1 Application(s) Topical daily  CRRT Treatment    <Continuous>  folic acid 1 milliGRAM(s) Oral daily  insulin lispro (ADMELOG) corrective regimen sliding scale   SubCutaneous Before meals and at bedtime  levETIRAcetam 750 milliGRAM(s) Oral two times a day  melatonin 5 milliGRAM(s) Oral at bedtime  metoprolol tartrate 25 milliGRAM(s) Oral two times a day  metoprolol tartrate Injectable 5 milliGRAM(s) IV Push once  milrinone Infusion 0.25 MICROgram(s)/kG/Min (7.48 mL/Hr) IV Continuous <Continuous>  pantoprazole  Injectable 40 milliGRAM(s) IV Push every 12 hours  Phoxillum Filtration BK 4 / 2.5 5000 milliLiter(s) (1000 mL/Hr) CRRT <Continuous>  Phoxillum Filtration BK 4 / 2.5 5000 milliLiter(s) (1500 mL/Hr) CRRT <Continuous>  Phoxillum Filtration BK 4 / 2.5 5000 milliLiter(s) (1500 mL/Hr) CRRT <Continuous>  QUEtiapine 25 milliGRAM(s) Oral at bedtime  sodium chloride 0.9%. 1000 milliLiter(s) (10 mL/Hr) IV Continuous <Continuous>    MEDICATIONS  (PRN):  benzocaine 15 mG/menthol 3.6 mG (Sugar-Free) Lozenge 1 Lozenge Oral three times a day PRN Sore Throat  lidocaine   Patch 1 Patch Transdermal every 24 hours PRN as needed  oxyCODONE    IR 5 milliGRAM(s) Oral every 4 hours PRN Moderate Pain (4 - 6)  oxyCODONE    IR 10 milliGRAM(s) Oral every 4 hours PRN Severe Pain (7 - 10)      Vital Signs Last 24 Hrs  T(C): 36.6 (19 Nov 2020 07:00), Max: 36.6 (18 Nov 2020 20:00)  T(F): 97.9 (19 Nov 2020 07:00), Max: 97.9 (19 Nov 2020 07:00)  HR: 102 (19 Nov 2020 07:00) (79 - 114)  BP: 92/59 (19 Nov 2020 04:00) (75/45 - 121/53)  BP(mean): 71 (19 Nov 2020 04:00) (55 - 80)  RR: 26 (19 Nov 2020 07:00) (7 - 26)  SpO2: 96% (19 Nov 2020 07:00) (91% - 99%)    I&O's Detail    18 Nov 2020 07:01  -  19 Nov 2020 07:00  --------------------------------------------------------  IN:    Albumin 5%  - 250 mL: 500 mL    Milrinone: 43.6 mL    Oral Fluid: 300 mL  Total IN: 843.6 mL    OUT:    Chest Tube (mL): 310 mL    Chest Tube (mL): 470 mL    Chest Tube (mL): 10 mL    Other (mL): 2523 mL  Total OUT: 3313 mL    Total NET: -2469.4 mL        I&O's Summary    18 Nov 2020 07:01  -  19 Nov 2020 07:00  --------------------------------------------------------  IN: 843.6 mL / OUT: 3313 mL / NET: -2469.4 mL            PHYSICAL EXAM:  General: Appears well developed, well nourished, no acute distress  HEENT: Head: normocephalic, atraumatic  Eyes: Pupils equal and reactive  Neck: Supple, no carotid bruit, no JVD, no HJR  CARDIOVASCULAR: Normal S1 and S2, no murmur, rub, or gallop  LUNGS: Clear to auscultation bilaterally, no rales, rhonchi or wheeze  ABDOMEN: Soft, nontender, non-distended, positive bowel sounds, no mass or bruit  EXTREMITIES: No edema, distal pulses WNL  SKIN: Warm and dry with normal turgor  NEURO: Alert & oriented x 3, grossly intact  PSYCH: normal mood and affect        LABS:                        9.1    13.59 )-----------( 73       ( 19 Nov 2020 03:09 )             29.3     11-19    135  |  100  |  21.0<H>  ----------------------------<  90  4.7   |  21.0<L>  |  1.28    Ca    7.8<L>      19 Nov 2020 05:11  Mg     2.6     11-19    TPro  5.3<L>  /  Alb  3.2<L>  /  TBili  1.8  /  DBili  x   /  AST  31  /  ALT  20  /  AlkPhos  53  11-18        PT/INR - ( 19 Nov 2020 05:11 )   PT: 42.0 sec;   INR: 3.86 ratio         PTT - ( 19 Nov 2020 05:11 )  PTT:36.1 sec  serum  Lipids:     Thyroid Stimulating Hormone, Serum: 1.95 uIU/mL (11-03 @ 08:18)      RADIOLOGY & ADDITIONAL STUDIES:    ECG:    ECHO:    STRESS TEST:    CARDIAC CATHETERIZATION:    ASSESSMENT AND PLAN:  In summary, JONATHAN GIBSON is a 60y Male with past medical history significant for CAD s/p CABG x 2, MVR, AVR, TVP,   - lines/drips as per CTS team  - tentative PPM in am d/w Dr. Raygoza  - cont current care  - meds reviewed  - d/w AGUSTO Quach, DO Hypothroidism

## 2021-07-21 NOTE — PHYSICAL EXAM
[Well Developed] : well developed [No Acute Distress] : no acute distress [Normal S1, S2] : normal S1, S2 [Clear Lung Fields] : clear lung fields [Normal Gait] : normal gait [No Edema] : no edema [Alert and Oriented] : alert and oriented [de-identified] : 3/6 systolic murmur

## 2021-07-21 NOTE — ASSESSMENT
[FreeTextEntry1] : 60 y/o male with a PMHx of psoriasis, Cocaine abuse (quit 2010), Aortic dissection s/p emergent repair (2010), surgery complicated by CVA w/ residual left sided weakness and bowel ischemia s/p bowel resection and ostomy with reversal repair, Covid infection, ESRD on HD (Tu/Thur/Sat) s/p LUE AVF, HTN, seizure disorder, GIB s/p colonoscopy and AVMs cauterization 3/2021 and CAD s/p remote stent to LAD and recent CABG x 2 (DGV-OM, RPDA with Dr. Butler 11/2020), AI and MR s/p tAVR, tMVR (11/2020 Dr. Butler) with post-op pAfib/flutter (on eliquis), HFrEF (EF 45-50% 02/21), who presented to the ER with complaints of shortness of breath and palpitations x 3 days found to be fluid overloaded with symptomatic atrial flutter w/ RVR. EP following for atrial arrhythmia management. Plan was MUSTAPHA/DCCV on 6/26/21 however MUSTAPHA demonstrated incidental finding of a possible ascending aortic root aneurysm in the setting of a prior Type A dissection repair in 2010. Patient with a known chronic type B dissection noted on preop CT scan (11/2020), however no mention of issues in the ascending aorta. DCCV was canceled and CTSx consult appreciated. CT scan showed CTSx follow up appreciated, they reviewed CT scans and reports. Type B aortic dissection appears stable since Nov 2020. 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 No contrast extravasation or hematoma. Patient had\par been maintained on Eliquis prior to admission.\par \par Ultimately underwent limited MUSTAPHA which demonstrated no BLAINE thrombus and DCCV with restoration of sinus rhythm. Had a 9 second conversion pause at time of cardioversion.  Was monitored on Telemetry and remain in sinus rhythm with HR in the 60-70s.  Was discharged on Toprol 75mg, Amiodarone 100mg (no loading dose) and Eliquis 5mg BID.\par \par Presents for post hospital follow-up. Maintaining SR on EKG and noted to have frequent multifocal PVCS on EKG (possibly papillary muscle in origin).   Complaints of intermittent rapid palpitations consistent possible PAF with RVR.  NO CP, SOB, ZUNIGA, dizziness, syncope or presyncope. NO LE edema or orthopnea.  \par Tolerating Eliquis with recurrent bleeding issues.  Remains on Toprol 50mg (unclear why decreased dose from hospitalization), Amiodarone 100mg and Elqiuis.

## 2021-07-21 NOTE — END OF VISIT
[Time Spent: ___ minutes] : I have spent [unfilled] minutes of time on the encounter. [FreeTextEntry3] : Seen and examined, agree with above\par well known to me from recent hospitalization.\par Recurrent symptomatic AFIB and AFL (likely atypical) > 6 months from cardiac surgery. Both rate and rhythm control were discussed. Rate control was unsuccessful in the recent past as he mostly gets AFL with RVR which is harder to rate control and have been leading to recurrent hemodynamic instability during IHD. He is awaiting renal transplant assessment but stated that this will take several months (> 3 months at least). Rhythm control options were discussed and will proceed with ablation. All benefits, risks and alternatives were discussed in details. He has been tolerating AC and reported that he had no recurrence of his GIB. He understands the importance of adherence to AC for at least 3 months if to go for AF ablation and confirmed that he is not anticipating any other procedures or surgeries soon. \par \par Will proceed with MUSTAPHA and AFib/AFL ablation (with Ensite/PATRICIA) as soon as possible\par Encourage him to arrange follow up with GI as soon as possible

## 2021-07-21 NOTE — REVIEW OF SYSTEMS
Statement Selected [Palpitations] : palpitations [Fever] : no fever [Chills] : no chills [SOB] : no shortness of breath [Feeling Fatigued] : not feeling fatigued [Dyspnea on exertion] : not dyspnea during exertion [Chest Discomfort] : no chest discomfort [Lower Ext Edema] : no extremity edema [Leg Claudication] : no intermittent leg claudication [Syncope] : no syncope [Orthopnea] : no orthopnea [Cough] : no cough [Dizziness] : no dizziness [Easy Bleeding] : no tendency for easy bleeding [Easy Bruising] : no tendency for easy bruising

## 2021-07-21 NOTE — PROGRESS NOTE ADULT - SKIN
detailed exam warm and dry [Well Nourished] : well nourished [Well Developed] : well developed [Well-Appearing] : well-appearing [Normal Sclera/Conjunctiva] : normal sclera/conjunctiva [PERRL] : pupils equal round and reactive to light [EOMI] : extraocular movements intact [Normal Outer Ear/Nose] : the outer ears and nose were normal in appearance [Normal Oropharynx] : the oropharynx was normal [No JVD] : no jugular venous distention [No Lymphadenopathy] : no lymphadenopathy [Supple] : supple [Thyroid Normal, No Nodules] : the thyroid was normal and there were no nodules present [No Respiratory Distress] : no respiratory distress  [No Accessory Muscle Use] : no accessory muscle use [Clear to Auscultation] : lungs were clear to auscultation bilaterally [Normal Rate] : normal rate  [Regular Rhythm] : with a regular rhythm [Normal S1, S2] : normal S1 and S2 [No Murmur] : no murmur heard [No Carotid Bruits] : no carotid bruits [No Abdominal Bruit] : a ~M bruit was not heard ~T in the abdomen [No Varicosities] : no varicosities [Pedal Pulses Present] : the pedal pulses are present [No Edema] : there was no peripheral edema [No Palpable Aorta] : no palpable aorta [No Extremity Clubbing/Cyanosis] : no extremity clubbing/cyanosis [Soft] : abdomen soft [Non Tender] : non-tender [Non-distended] : non-distended [No Masses] : no abdominal mass palpated [No HSM] : no HSM [Normal Bowel Sounds] : normal bowel sounds [Normal Posterior Cervical Nodes] : no posterior cervical lymphadenopathy [Normal Anterior Cervical Nodes] : no anterior cervical lymphadenopathy [No CVA Tenderness] : no CVA  tenderness [No Spinal Tenderness] : no spinal tenderness [No Joint Swelling] : no joint swelling [Grossly Normal Strength/Tone] : grossly normal strength/tone [No Rash] : no rash [Coordination Grossly Intact] : coordination grossly intact [No Focal Deficits] : no focal deficits [Normal Gait] : normal gait [Deep Tendon Reflexes (DTR)] : deep tendon reflexes were 2+ and symmetric [Normal Affect] : the affect was normal [Normal Insight/Judgement] : insight and judgment were intact

## 2021-07-21 NOTE — HISTORY OF PRESENT ILLNESS
[FreeTextEntry1] : 60 y/o male with a PMHx of psoriasis, Cocaine abuse (quit 2010), Aortic dissection s/p emergent repair (2010), surgery complicated by CVA w/ residual left sided weakness and bowel ischemia s/p bowel resection and ostomy with reversal repair, Covid infection, ESRD on HD (Tu/Thur/Sat) s/p LUE AVF, HTN, seizure disorder, GIB s/p colonoscopy and AVMs cauterization 3/2021 and CAD s/p remote stent to LAD and recent CABG x 2 (DGV-OM, RPDA with Dr. Butler 11/2020), AI and MR s/p tAVR, tMVR (11/2020 Dr. Butler) with post-op pAfib/flutter (on eliquis), HFrEF (EF 45-50% 02/21), who presented to the ER with complaints of shortness of breath and palpitations x 3 days found to be fluid overloaded with symptomatic atrial flutter w/ RVR. EP following for atrial arrhythmia management. Plan was MUSTAPHA/DCCV on 6/26/21 however UMSTAPHA demonstrated incidental finding of a possible ascending aortic root aneurysm in the setting of a prior Type A dissection repair in 2010. Patient with a known chronic type B dissection noted on preop CT scan (11/2020), however no mention of issues in the ascending aorta. DCCV was canceled and CTSx consult appreciated. CT scan showed CTSx follow up appreciated, they reviewed CT scans and reports. Type B aortic dissection appears stable since Nov 2020. 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 No contrast extravasation or hematoma. Patient had\par been maintained on Eliquis prior to admission.\par \par Ultimately underwent limited MUSTAPHA which demonstrated no BLAINE thrombus and DCCV with restoration of sinus rhythm. Had a 9 second conversion pause at time of cardioversion.  Was monitored on Telemetry and remain in sinus rhythm with HR in the 60-70s.  Was discharged on Toprol 75mg, Amiodarone 100mg (no loading dose) and Eliquis 5mg BID.\par \par Presents for post hospital follow-up. Maintaining SR on EKG and noted to have frequent multifocal PVCS on EKG (possibly papillary muscle in origin).   Complaints of intermittent rapid palpitations consistent possible PAF with RVR.  NO CP, SOB, ZUNIGA, dizziness, syncope or presyncope. NO LE edema or orthopnea. Tolerating Eliquis with recurrent bleeding issues.  Remains on Toprol 50mg (unclear why decreased dose from hospitalization), Amiodarone 100mg and Elqiuis.\par \par

## 2021-07-21 NOTE — DISCUSSION/SUMMARY
[FreeTextEntry1] : 1)  PAFib and Flutter with RVR w/ associated CHF exacerbation:  LVEF 40-45% during recent admission (previously 45-50%).  Maintaining SR today but possibly having brief episodes of symptomatic PAF with RVR.  Discuss management options including AAT vs ablation.  \par - Patient is agreeable to AF/Flutter ablation.\par - Will continue Amiodarone 100mg leading up to procedure and 1 month after.  Continue Toprol 50mg and Eliquis - hold AM dose of a/c morning of procedure.  \par - Sucralfate for GI prophylaxis ordered and to be initiated post procedure. 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  \par - Arrange 1 week MCOT to assess PVC burden. Will also repeat once off Amiodarone. \par - May consider further management (ie ablation) if frequent PVCS that could be contributing to CM.\par \par 3)  H/o GIB s/p colonoscopy and AVMs cauterization 3/2021:  No recurrent bleeding issues currently tolerating a/c. Recommend GI f/up. Consider possible LAAO with WATCHMAN if recurrent bleeding issues develop. \par \par 4) HFrEF:  EF previously 45-50% 2/21 now 40-45%.  On GDMT for CHF with BB and ARB. NO history of near or true syncope. History of CAD but no indication for EPS to screen for inducible arrhythmia given LV function > 40%.  Consider repeat TTE in sinus rhythm\par \par 5)  CAD- s/p CABG:  On ASA and statin.  Will plan for repeat stress test per Dr. Frankel in anticipation for Renal Transplant\par \par 6) Ascending aortic root aneurysm:  CTSx reviewed Type B aortic dissection appears stable since Nov 2020. 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 No contrast extravasation or hematoma. \par - No further surgical interventions required\par \par 7)  H/o CKD on HD:  Being evaluated for potential renal transplant. Following with Dr. Reyes\par \par Seen and d/w Dr. Virk\par [place] VO\par \par

## 2021-07-26 ENCOUNTER — NON-APPOINTMENT (OUTPATIENT)
Age: 61
End: 2021-07-26

## 2021-08-02 ENCOUNTER — NON-APPOINTMENT (OUTPATIENT)
Age: 61
End: 2021-08-02

## 2021-08-03 ENCOUNTER — APPOINTMENT (OUTPATIENT)
Dept: CARDIOLOGY | Facility: CLINIC | Age: 61
End: 2021-08-03

## 2021-08-03 ENCOUNTER — APPOINTMENT (OUTPATIENT)
Dept: NEUROLOGY | Facility: CLINIC | Age: 61
End: 2021-08-03

## 2021-08-12 ENCOUNTER — INPATIENT (INPATIENT)
Facility: HOSPITAL | Age: 61
LOS: 1 days | Discharge: HOME | DRG: 314 | End: 2021-08-14
Attending: HOSPITALIST | Admitting: INTERNAL MEDICINE
Payer: MEDICARE

## 2021-08-12 VITALS
DIASTOLIC BLOOD PRESSURE: 79 MMHG | HEART RATE: 71 BPM | TEMPERATURE: 98 F | RESPIRATION RATE: 20 BRPM | SYSTOLIC BLOOD PRESSURE: 184 MMHG | OXYGEN SATURATION: 98 %

## 2021-08-12 DIAGNOSIS — I77.0 ARTERIOVENOUS FISTULA, ACQUIRED: ICD-10-CM

## 2021-08-12 DIAGNOSIS — T82.590A OTHER MECHANICAL COMPLICATION OF SURGICALLY CREATED ARTERIOVENOUS FISTULA, INITIAL ENCOUNTER: ICD-10-CM

## 2021-08-12 DIAGNOSIS — Z95.1 PRESENCE OF AORTOCORONARY BYPASS GRAFT: Chronic | ICD-10-CM

## 2021-08-12 DIAGNOSIS — Z95.2 PRESENCE OF PROSTHETIC HEART VALVE: Chronic | ICD-10-CM

## 2021-08-12 DIAGNOSIS — I77.9 DISORDER OF ARTERIES AND ARTERIOLES, UNSPECIFIED: Chronic | ICD-10-CM

## 2021-08-12 DIAGNOSIS — Z90.49 ACQUIRED ABSENCE OF OTHER SPECIFIED PARTS OF DIGESTIVE TRACT: Chronic | ICD-10-CM

## 2021-08-12 LAB
ALBUMIN SERPL ELPH-MCNC: 4.5 G/DL — SIGNIFICANT CHANGE UP (ref 3.3–5.2)
ALLERGY+IMMUNOLOGY DIAG STUDY NOTE: SIGNIFICANT CHANGE UP
ALP SERPL-CCNC: 136 U/L — HIGH (ref 40–120)
ALT FLD-CCNC: 23 U/L — SIGNIFICANT CHANGE UP
ANION GAP SERPL CALC-SCNC: 11 MMOL/L — SIGNIFICANT CHANGE UP (ref 5–17)
APTT BLD: 40.9 SEC — HIGH (ref 27.5–35.5)
AST SERPL-CCNC: 15 U/L — SIGNIFICANT CHANGE UP
BILIRUB SERPL-MCNC: 0.8 MG/DL — SIGNIFICANT CHANGE UP (ref 0.4–2)
BLD GP AB SCN SERPL QL: SIGNIFICANT CHANGE UP
BUN SERPL-MCNC: 33.7 MG/DL — HIGH (ref 8–20)
CALCIUM SERPL-MCNC: 9.3 MG/DL — SIGNIFICANT CHANGE UP (ref 8.6–10.2)
CHLORIDE SERPL-SCNC: 107 MMOL/L — SIGNIFICANT CHANGE UP (ref 98–107)
CO2 SERPL-SCNC: 21 MMOL/L — LOW (ref 22–29)
CREAT SERPL-MCNC: 2.08 MG/DL — HIGH (ref 0.5–1.3)
DIR ANTIGLOB POLYSPECIFIC INTERPRETATION: SIGNIFICANT CHANGE UP
GLUCOSE SERPL-MCNC: 88 MG/DL — SIGNIFICANT CHANGE UP (ref 70–99)
HCT VFR BLD CALC: 29.6 % — LOW (ref 39–50)
HGB BLD-MCNC: 9.4 G/DL — LOW (ref 13–17)
INR BLD: 1.3 RATIO — HIGH (ref 0.88–1.16)
MAGNESIUM SERPL-MCNC: 2.3 MG/DL — SIGNIFICANT CHANGE UP (ref 1.6–2.6)
MCHC RBC-ENTMCNC: 30 PG — SIGNIFICANT CHANGE UP (ref 27–34)
MCHC RBC-ENTMCNC: 31.8 GM/DL — LOW (ref 32–36)
MCV RBC AUTO: 94.6 FL — SIGNIFICANT CHANGE UP (ref 80–100)
PLATELET # BLD AUTO: 121 K/UL — LOW (ref 150–400)
POTASSIUM SERPL-MCNC: 4.3 MMOL/L — SIGNIFICANT CHANGE UP (ref 3.5–5.3)
POTASSIUM SERPL-SCNC: 4.3 MMOL/L — SIGNIFICANT CHANGE UP (ref 3.5–5.3)
PROT SERPL-MCNC: 7.6 G/DL — SIGNIFICANT CHANGE UP (ref 6.6–8.7)
PROTHROM AB SERPL-ACNC: 14.9 SEC — HIGH (ref 10.6–13.6)
RBC # BLD: 3.13 M/UL — LOW (ref 4.2–5.8)
RBC # FLD: 13.5 % — SIGNIFICANT CHANGE UP (ref 10.3–14.5)
SARS-COV-2 RNA SPEC QL NAA+PROBE: SIGNIFICANT CHANGE UP
SODIUM SERPL-SCNC: 139 MMOL/L — SIGNIFICANT CHANGE UP (ref 135–145)
T4 AB SER-ACNC: 6.1 UG/DL — SIGNIFICANT CHANGE UP (ref 4.5–12)
TSH SERPL-MCNC: 2.16 UIU/ML — SIGNIFICANT CHANGE UP (ref 0.27–4.2)
WBC # BLD: 5.19 K/UL — SIGNIFICANT CHANGE UP (ref 3.8–10.5)
WBC # FLD AUTO: 5.19 K/UL — SIGNIFICANT CHANGE UP (ref 3.8–10.5)

## 2021-08-12 PROCEDURE — 71045 X-RAY EXAM CHEST 1 VIEW: CPT | Mod: 26

## 2021-08-12 PROCEDURE — 99223 1ST HOSP IP/OBS HIGH 75: CPT

## 2021-08-12 PROCEDURE — 93010 ELECTROCARDIOGRAM REPORT: CPT

## 2021-08-12 PROCEDURE — 86077 PHYS BLOOD BANK SERV XMATCH: CPT

## 2021-08-12 PROCEDURE — 99285 EMERGENCY DEPT VISIT HI MDM: CPT | Mod: GC

## 2021-08-12 PROCEDURE — 99232 SBSQ HOSP IP/OBS MODERATE 35: CPT

## 2021-08-12 RX ORDER — HYDRALAZINE HCL 50 MG
10 TABLET ORAL EVERY 6 HOURS
Refills: 0 | Status: DISCONTINUED | OUTPATIENT
Start: 2021-08-12 | End: 2021-08-14

## 2021-08-12 RX ORDER — GABAPENTIN 400 MG/1
100 CAPSULE ORAL AT BEDTIME
Refills: 0 | Status: DISCONTINUED | OUTPATIENT
Start: 2021-08-12 | End: 2021-08-14

## 2021-08-12 RX ORDER — SEVELAMER CARBONATE 2400 MG/1
800 POWDER, FOR SUSPENSION ORAL
Refills: 0 | Status: DISCONTINUED | OUTPATIENT
Start: 2021-08-12 | End: 2021-08-14

## 2021-08-12 RX ORDER — ENOXAPARIN SODIUM 100 MG/ML
80 INJECTION SUBCUTANEOUS ONCE
Refills: 0 | Status: COMPLETED | OUTPATIENT
Start: 2021-08-12 | End: 2021-08-12

## 2021-08-12 RX ORDER — ACETAMINOPHEN 500 MG
650 TABLET ORAL EVERY 6 HOURS
Refills: 0 | Status: DISCONTINUED | OUTPATIENT
Start: 2021-08-12 | End: 2021-08-14

## 2021-08-12 RX ORDER — PANTOPRAZOLE SODIUM 20 MG/1
40 TABLET, DELAYED RELEASE ORAL
Refills: 0 | Status: DISCONTINUED | OUTPATIENT
Start: 2021-08-12 | End: 2021-08-14

## 2021-08-12 RX ORDER — NIFEDIPINE 30 MG
60 TABLET, EXTENDED RELEASE 24 HR ORAL DAILY
Refills: 0 | Status: DISCONTINUED | OUTPATIENT
Start: 2021-08-12 | End: 2021-08-14

## 2021-08-12 RX ORDER — LEVETIRACETAM 250 MG/1
750 TABLET, FILM COATED ORAL
Refills: 0 | Status: DISCONTINUED | OUTPATIENT
Start: 2021-08-12 | End: 2021-08-14

## 2021-08-12 RX ORDER — ATORVASTATIN CALCIUM 80 MG/1
40 TABLET, FILM COATED ORAL AT BEDTIME
Refills: 0 | Status: DISCONTINUED | OUTPATIENT
Start: 2021-08-12 | End: 2021-08-14

## 2021-08-12 RX ORDER — METOPROLOL TARTRATE 50 MG
25 TABLET ORAL
Refills: 0 | Status: DISCONTINUED | OUTPATIENT
Start: 2021-08-12 | End: 2021-08-13

## 2021-08-12 RX ORDER — ASPIRIN/CALCIUM CARB/MAGNESIUM 324 MG
81 TABLET ORAL DAILY
Refills: 0 | Status: DISCONTINUED | OUTPATIENT
Start: 2021-08-12 | End: 2021-08-14

## 2021-08-12 RX ADMIN — Medication 25 MILLIGRAM(S): at 17:45

## 2021-08-12 RX ADMIN — LEVETIRACETAM 750 MILLIGRAM(S): 250 TABLET, FILM COATED ORAL at 17:45

## 2021-08-12 RX ADMIN — ENOXAPARIN SODIUM 80 MILLIGRAM(S): 100 INJECTION SUBCUTANEOUS at 17:46

## 2021-08-12 RX ADMIN — GABAPENTIN 100 MILLIGRAM(S): 400 CAPSULE ORAL at 22:39

## 2021-08-12 RX ADMIN — SEVELAMER CARBONATE 800 MILLIGRAM(S): 2400 POWDER, FOR SUSPENSION ORAL at 17:46

## 2021-08-12 RX ADMIN — ATORVASTATIN CALCIUM 40 MILLIGRAM(S): 80 TABLET, FILM COATED ORAL at 22:39

## 2021-08-12 NOTE — CONSULT NOTE ADULT - SUBJECTIVE AND OBJECTIVE BOX
[facility] DIVISION OF KIDNEY DISEASES AND HYPERTENSION -- INITIAL CONSULT NOTE  --------------------------------------------------------------------------------  HPI:  '62 y/o male with hx of ESRD on HD on Tu/Thur/Sat w/ last successful HD on Sat 08.07/21 s/p LUE AVF, CAD s/p remote PCI, AT/Aflutter, aortic insufficiency and mitral regurgitation s/p bioAVR and bioMVR, CABG, HFrEF (EF 40%), ascending aortic dissection s/p surgery complicated by CVA with residual left-sided weakness, colon resection for bowel resection s/p ostomy with reversal, also hx HTN, seizure disorder, psoriasis presents to the ED c/o "clogged fistula". Pt states he went to the dialysis center this morning but was unable to be dialyzed due to a blockage in his AVF and was sent to the ED by the nurse at the center. He denies N/V/D/CP/SOB/F/C/HA/AMS at this time but states he has been feeling intermittently fatigued and confused since being DC'd from his last cardiac procedure on June 29th.''    Above appreciated.  Nephrology consulted for HD.        PAST HISTORY  --------------------------------------------------------------------------------  PAST MEDICAL & SURGICAL HISTORY:  CHF (congestive heart failure)    CVA (cerebral vascular accident)    Seizures  last seizure 10 years ago    HTN (hypertension)    Hyperlipemia    COVID-19 october 2020    Atrial fibrillation    ESRD on dialysis  Tu/Thurs/Sat    Former smoker    History of cocaine use  remote hx, currently sober    H/O aortic dissection  s/p repair (2010), surgery complicated by bowel ischemia s/p bowel resection and ostomy with reversal    H/O aortic valve insufficiency    Mitral regurgitation    Aorta disorder    H/O colectomy    Status post double vessel coronary artery bypass    S/P AVR (aortic valve replacement)    S/P MVR (mitral valve replacement)      FAMILY HISTORY:  FH: hypertension    Family history of cardiac disorder (Mother)  mother      PAST SOCIAL HISTORY: lives at home    ALLERGIES & MEDICATIONS  --------------------------------------------------------------------------------  Allergies  penicillin (Other)      Standing Inpatient Medications    PRN Inpatient Medications      REVIEW OF SYSTEMS  --------------------------------------------------------------------------------  Gen: No weight changes, fatigue, fevers/chills, weakness  Skin: No rashes  Head/Eyes/Ears/Mouth: No headache; Normal hearing; Normal vision w/o blurriness; No sinus pain/discomfort, sore throat  Respiratory: No dyspnea, cough, wheezing, hemoptysis  CV: No chest pain, PND, orthopnea  GI: No abdominal pain, diarrhea, constipation, nausea, vomiting, melena, hematochezia  : No increased frequency, dysuria, hematuria, nocturia  MSK: No joint pain/swelling; no back pain; no edema  Neuro: No dizziness/lightheadedness, weakness, seizures, numbness, tingling  Heme: No easy bruising or bleeding  Endo: No heat/cold intolerance  Psych: No significant nervousness, anxiety, stress, depression    All other systems were reviewed and are negative, except as noted.    VITALS/PHYSICAL EXAM  --------------------------------------------------------------------------------  T(C): 36.8 (08-12-21 @ 11:13), Max: 36.8 (08-12-21 @ 11:13)  HR: 71 (08-12-21 @ 11:13) (71 - 71)  BP: 184/79 (08-12-21 @ 11:13) (184/79 - 184/79)  RR: 20 (08-12-21 @ 11:13) (20 - 20)  SpO2: 98% (08-12-21 @ 11:13) (98% - 98%)  Wt(kg): --  Height (cm): 175.3 (08-12-21 @ 11:39)  Weight (kg): 83.9 (08-12-21 @ 11:39)  BMI (kg/m2): 27.3 (08-12-21 @ 11:39)  BSA (m2): 2 (08-12-21 @ 11:39)      Physical Exam:  	Gen: NAD, well-appearing  	HEENT: PERRL, supple neck, clear oropharynx  	Pulm: CTA B/L  	CV: RRR, S1S2; no rub  	Back: No spinal or CVA tenderness; no sacral edema  	Abd: +BS, soft, nontender/nondistended  	: No suprapubic tenderness  	UE: Warm, FROM, no clubbing, intact strength; no edema; no asterixis  	LE: Warm, FROM, no clubbing, intact strength; no edema  	Neuro: No focal deficits, intact gait  	Psych: Normal affect and mood  	Skin: Warm, without rashes  	Vascular access:    LABS/STUDIES  --------------------------------------------------------------------------------              9.4    5.19  >-----------<  121      [08-12-21 @ 12:05]              29.6     139  |  107  |  33.7  ----------------------------<  88      [08-12-21 @ 12:05]  4.3   |  21.0  |  2.08        Ca     9.3     [08-12-21 @ 12:05]      Mg     2.3     [08-12-21 @ 12:05]    TPro  7.6  /  Alb  4.5  /  TBili  0.8  /  DBili  x   /  AST  15  /  ALT  23  /  AlkPhos  136  [08-12-21 @ 12:05]    PT/INR: PT 14.9 , INR 1.30       [08-12-21 @ 12:05]  PTT: 40.9       [08-12-21 @ 12:05]      Creatinine Trend:  SCr 2.08 [08-12 @ 12:05]        Iron 29, TIBC 227, %sat 13      [12-05-20 @ 08:05]  Ferritin 1099      [12-05-20 @ 08:05]  TSH 2.16      [08-12-21 @ 12:05]  Lipid: chol --, , HDL --, LDL --      [11-15-20 @ 02:40]    HBsAb <3.0      [06-25-21 @ 02:07]  HBsAg Nonreact      [06-25-21 @ 02:07]  HBcAb Nonreact      [11-07-20 @ 05:41]  HCV 0.10, Nonreact      [12-07-20 @ 14:20]    SPEP Interpretation: Normal Electrophoresis Pattern      [12-05-20 @ 08:05]   [facility] DIVISION OF KIDNEY DISEASES AND HYPERTENSION -- INITIAL CONSULT NOTE  --------------------------------------------------------------------------------  HPI:  '62 y/o male with hx of ESRD on HD on Tu/Thur/Sat w/ last successful HD on Sat 08.07/21 s/p LUE AVF, CAD s/p remote PCI, AT/Aflutter, aortic insufficiency and mitral regurgitation s/p bioAVR and bioMVR, CABG, HFrEF (EF 40%), ascending aortic dissection s/p surgery complicated by CVA with residual left-sided weakness, colon resection for bowel resection s/p ostomy with reversal, also hx HTN, seizure disorder, psoriasis presents to the ED c/o "clogged fistula". Pt states he went to the dialysis center this morning but was unable to be dialyzed due to a blockage in his AVF and was sent to the ED by the nurse at the center. He denies N/V/D/CP/SOB/F/C/HA/AMS at this time but states he has been feeling intermittently fatigued and confused since being DC'd from his last cardiac procedure on June 29th.''    Above appreciated.  Nephrology consulted for HD.  Pt seen and examined; feels well.  No acute complaint.  Reports he last got HD on saturday- didnt go to dialysis on tuesday as he was feeling dizzy.          PAST HISTORY  --------------------------------------------------------------------------------  PAST MEDICAL & SURGICAL HISTORY:  CHF (congestive heart failure)    CVA (cerebral vascular accident)    Seizures  last seizure 10 years ago    HTN (hypertension)    Hyperlipemia    COVID-19 october 2020    Atrial fibrillation    ESRD on dialysis  Tu/Thurs/Sat    Former smoker    History of cocaine use  remote hx, currently sober    H/O aortic dissection  s/p repair (2010), surgery complicated by bowel ischemia s/p bowel resection and ostomy with reversal    H/O aortic valve insufficiency    Mitral regurgitation    Aorta disorder    H/O colectomy    Status post double vessel coronary artery bypass    S/P AVR (aortic valve replacement)    S/P MVR (mitral valve replacement)      FAMILY HISTORY:  FH: hypertension    Family history of cardiac disorder (Mother)  mother      PAST SOCIAL HISTORY: lives at home    ALLERGIES & MEDICATIONS  --------------------------------------------------------------------------------  Allergies  penicillin (Other)      Standing Inpatient Medications    PRN Inpatient Medications      REVIEW OF SYSTEMS  --------------------------------------------------------------------------------  Gen: No weight changes, fatigue, fevers/chills, weakness  Skin: No rashes  Head/Eyes/Ears/Mouth: No headache; Normal hearing; Normal vision w/o blurriness; No sinus pain/discomfort, sore throat  Respiratory: No dyspnea, cough, wheezing, hemoptysis  CV: No chest pain, PND, orthopnea  GI: No abdominal pain, diarrhea, constipation, nausea, vomiting, melena, hematochezia  : No increased frequency, dysuria, hematuria, nocturia  MSK: No joint pain/swelling; no back pain; no edema  Neuro: No dizziness/lightheadedness, weakness, seizures, numbness, tingling  Heme: No easy bruising or bleeding  Endo: No heat/cold intolerance  Psych: No significant nervousness, anxiety, stress, depression    All other systems were reviewed and are negative, except as noted.    VITALS/PHYSICAL EXAM  --------------------------------------------------------------------------------  T(C): 36.8 (08-12-21 @ 11:13), Max: 36.8 (08-12-21 @ 11:13)  HR: 71 (08-12-21 @ 11:13) (71 - 71)  BP: 184/79 (08-12-21 @ 11:13) (184/79 - 184/79)  RR: 20 (08-12-21 @ 11:13) (20 - 20)  SpO2: 98% (08-12-21 @ 11:13) (98% - 98%)  Wt(kg): --  Height (cm): 175.3 (08-12-21 @ 11:39)  Weight (kg): 83.9 (08-12-21 @ 11:39)  BMI (kg/m2): 27.3 (08-12-21 @ 11:39)  BSA (m2): 2 (08-12-21 @ 11:39)      Physical Exam:  	Gen: NAD  	HEENT:  supple neck, clear oropharynx  	Pulm: CTA B/L  	CV: RRR, S1S2; no rub  	Back: No spinal or CVA tenderness; no sacral edema  	Abd: +BS, soft, nontender/nondistended  	: No suprapubic tenderness  	UE: Warm, no edema  	LE: Warm,  trace edema  	Neuro: No focal deficit  	Psych: Normal affect and mood  	Skin: Warm, without rashes  	Vascular access: MICHAEL STEVENS, no thrill    LABS/STUDIES  --------------------------------------------------------------------------------              9.4    5.19  >-----------<  121      [08-12-21 @ 12:05]              29.6     139  |  107  |  33.7  ----------------------------<  88      [08-12-21 @ 12:05]  4.3   |  21.0  |  2.08        Ca     9.3     [08-12-21 @ 12:05]      Mg     2.3     [08-12-21 @ 12:05]    TPro  7.6  /  Alb  4.5  /  TBili  0.8  /  DBili  x   /  AST  15  /  ALT  23  /  AlkPhos  136  [08-12-21 @ 12:05]    PT/INR: PT 14.9 , INR 1.30       [08-12-21 @ 12:05]  PTT: 40.9       [08-12-21 @ 12:05]      Creatinine Trend:  SCr 2.08 [08-12 @ 12:05]        Iron 29, TIBC 227, %sat 13      [12-05-20 @ 08:05]  Ferritin 1099      [12-05-20 @ 08:05]  TSH 2.16      [08-12-21 @ 12:05]  Lipid: chol --, , HDL --, LDL --      [11-15-20 @ 02:40]    HBsAb <3.0      [06-25-21 @ 02:07]  HBsAg Nonreact      [06-25-21 @ 02:07]  HBcAb Nonreact      [11-07-20 @ 05:41]  HCV 0.10, Nonreact      [12-07-20 @ 14:20]    SPEP Interpretation: Normal Electrophoresis Pattern      [12-05-20 @ 08:05]

## 2021-08-12 NOTE — ED PROVIDER NOTE - NS ED ROS FT
Review of Systems  CONSTITUTIONAL: afebrile w/no diaphoresis or weight changes  SKIN: warm, dry w/ no rash or bleeding  EYES: no changes to vision  ENT: no changes in hearing, no sore throat  RESPIRATORY: no cough or SOB  CARDIAC: +irregular heartbeat and sensation of palps [id number]; otherwise no chest pain   GI: no abd pain, nausea, vomiting, diarrhea, constipation, or blood in stool/derick blood  GENITO-URINARY: no discharge, dysuria or hematuria,   ENDO: no polydipsia, polyuria, no symptoms of heat/cold intolerance  MUSCULOSKELETAL:  no joint pain, swelling or redness  NEUROLOGIC: +intermittent confusion & lethergy [id number], +left sided weakness at baseline; otherwise no headache, anesthesias or paresthesias  PSYCH: no anxiety, non suicidal, non homicidal, without hallucinations or depression

## 2021-08-12 NOTE — H&P ADULT - NSHPREVIEWOFSYSTEMS_GEN_ALL_CORE
REVIEW OF SYSTEMS:    CONSTITUTIONAL: fatigue  EYES: No eye pain, visual disturbances, or discharge  ENMT:  No difficulty hearing, tinnitus, vertigo; No sinus or throat pain  NECK: No pain or stiffness  BREASTS: No pain, masses, or nipple discharge  RESPIRATORY: No cough, wheezing, chills or hemoptysis; No shortness of breath  CARDIOVASCULAR: No chest pain, palpitations, dizziness, or leg swelling  GASTROINTESTINAL: No abdominal or epigastric pain. No nausea, vomiting, or hematemesis; No diarrhea or constipation. No melena or hematochezia.  GENITOURINARY: No dysuria, frequency, hematuria, or incontinence  NEUROLOGICAL: No headaches, memory loss, loss of strength, numbness, or tremors  SKIN: No itching, burning, rashes, or lesions   LYMPH NODES: No enlarged glands  ENDOCRINE: No heat or cold intolerance; No hair loss  MUSCULOSKELETAL: No joint pain or swelling; No muscle, back, or extremity pain  PSYCHIATRIC: No depression, anxiety, mood swings, or difficulty sleeping  HEME/LYMPH: No easy bruising, or bleeding gums  ALLERY AND IMMUNOLOGIC: No hives or eczema

## 2021-08-12 NOTE — ED PROVIDER NOTE - OBJECTIVE STATEMENT
Mr. Cummins is a 60 y/o male with hx of ESRD on HD on Tu/Thur/Sat w/ last succesfull HD on Sat 08.07/21 s/p LUE AVF, CAD s/p remote PCI, AT/Aflutter, aortic insufficiency and mitral regurgitation s/p bioAVR and bioMVR, CABG, HFrEF (EF 40%), ascending aortic dissection s/p surgery complicated by CVA with residual left-sided weakness, colon resection for bowel resection s/p ostomy with reversal, also hx HTN, seizure disorder, psoriasis     presents to the ED c/o "clogged fistula". Pt states he went to the dialysis center this morning but was unable to be dialyzed due to a blockage in his AVF and was sent to the ED by the nurse at the center. He denies N/V/D/CP/SOB/F/C/HA/AMS at this time but states he has been feeling intermittently fatigued and confused since being DC'd from his last cardiac procedure on June 29th.

## 2021-08-12 NOTE — PATIENT PROFILE ADULT - LIVING ENVIRONMENT
Preventive Health Recommendations  Female Ages 65 +    Yearly exam:     See your health care provider every year in order to  o Review health changes.   o Discuss preventive care.    o Review your medicines if your doctor has prescribed any.      You no longer need a yearly Pap test unless you've had an abnormal Pap test in the past 10 years. If you have vaginal symptoms, such as bleeding or discharge, be sure to talk with your provider about a Pap test.      Every 1 to 2 years, have a mammogram.  If you are over 69, talk with your health care provider about whether or not you want to continue having screening mammograms.      Every 10 years, have a colonoscopy. Or, have a yearly FIT test (stool test). These exams will check for colon cancer.       Have a cholesterol test every 5 years, or more often if your doctor advises it.       Have a diabetes test (fasting glucose) every three years. If you are at risk for diabetes, you should have this test more often.       At age 65, have a bone density scan (DEXA) to check for osteoporosis (brittle bone disease).    Shots:    Get a flu shot each year.    Get a tetanus shot every 10 years.    Talk to your doctor about your pneumonia vaccines. There are now two you should receive - Pneumovax (PPSV 23) and Prevnar (PCV 13).    Talk to your doctor about the shingles vaccine.    Talk to your doctor about the hepatitis B vaccine.    Nutrition:     Eat at least 5 servings of fruits and vegetables each day.      Eat whole-grain bread, whole-wheat pasta and brown rice instead of white grains and rice.      Talk to your provider about Calcium and Vitamin D.     Lifestyle    Exercise at least 150 minutes a week (30 minutes a day, 5 days a week). This will help you control your weight and prevent disease.      Limit alcohol to one drink per day.      No smoking.       Wear sunscreen to prevent skin cancer.       See your dentist twice a year for an exam and cleaning.      See your  eye doctor every 1 to 2 years to screen for conditions such as glaucoma, macular degeneration, cataracts, etc     At [facility], we strive to deliver an exceptional experience to you, every time we see you.  If you receive a survey in the mail, please send us back your thoughts. We really do value your feedback.    Based on your medical history, these are the current health maintenance/preventive care services that you are due for (some may have been done at this visit.)  Health Maintenance Due   Topic Date Due     TETANUS IMMUNIZATION (SYSTEM ASSIGNED)  02/13/1970     HEPATITIS C SCREENING  02/13/1970     LIPID SCREEN Q5 YR FEMALE (SYSTEM ASSIGNED)  02/13/1997     MAMMO SCREEN Q2 YR (SYSTEM ASSIGNED)  02/13/2002     COLON CANCER SCREEN (SYSTEM ASSIGNED)  02/13/2002     ADVANCE DIRECTIVE PLANNING Q5 YRS  02/13/2007     FALL RISK ASSESSMENT  02/13/2017     DEXA SCAN SCREENING (SYSTEM ASSIGNED)  02/13/2017     PNEUMOCOCCAL (1 of 2 - PCV13) 02/13/2017         Suggested websites for health information:  Www.[employer].com.org : Up to date and easily searchable information on multiple topics.  Www.medlineplus.gov : medication info, interactive tutorials, watch real surgeries online  Www.familydoctor.org : good info from the Academy of Family Physicians  Www.cdc.gov : public health info, travel advisories, epidemics (H1N1)  Www.aap.org : children's health info, normal development, vaccinations  Www.health.state.[place].us : [place] dept of health, public health issues in [place], N1N1    Your care team:                            Family Medicine Internal Medicine   MD Jerry Torres MD Shantel Branch-Fleming, MD Katya Georgiev PA-C Nam Ho, MD Pediatrics   VAZQUEZ Bass, MD Zhane Thakur CNP, MD Deborah Mielke, MD Kim Thein, APRN CNP      Clinic hours: Monday - Thursday 7 am-7 pm; Fridays 7 am-5 pm.   Urgent care: Monday - Friday  11 am-9 pm; Saturday and Sunday 9 am-5 pm.  Pharmacy : Monday -Thursday 8 am-8 pm; Friday 8 am-6 pm; Saturday and Sunday 9 am-5 pm.     Clinic: [phone]   Pharmacy: [phone]     no

## 2021-08-12 NOTE — CONSULT NOTE ADULT - SUBJECTIVE AND OBJECTIVE BOX
Vascular Surgery Consult    Consulting attending: Dr. Scotty Argueta      HPI:  60 y/o M w/ a PMHx of ESRD on HD on Tu/Thur/Sat via LUE AVG, CAD s/p remote PCI, AT/Aflutter, aortic insufficiency and mitral regurgitation s/p bioAVR and bioMVR, CABG, HFrEF (EF 40%), ascending aortic dissection s/p surgery complicated by CVA with residual left-sided weakness, colon resection for bowel resection s/p ostomy with reversal, and HTN who presents form Dialysis center with non-functioning LUE AVG, which was created on 1/29/21. It had been functioning well up until now. Patient was last dialyzed on Saturday, missing Tuesday. Today the graft was accessed and only dark clotted blood came     seizure disorder, psoriasis presented to [facility] ED c/o sob and palpitations x 3 days. Pt states the palpitations began sunday night and the sob started yesterday. The pt said he felt "off" with associated dizziness and shaking when he was having palpitations. The pt missed his scheduled dialysis on tuesday 06/22. Pt went to see his PCP and did an EKG, recommended F/U with cardio but presented to the ED instead due to worsening sob. Pt was found resting comfortably in NAD upon arrival for examination. Pt has also been experienciong B/L LE cramping during dialysis,  Pt admits to recent consumption of electrolyte power supplements of a few servings in a day to correct his electrolytes and cramping. Pt denies fever, chills, HA, blurry vision, cough, chest pain, n/v/d, abdominal pain, back pain, leg pain/swelling, recent drug/stimulant use.      PAST MEDICAL HISTORY:  CHF (congestive heart failure)    CVA (cerebral vascular accident)    Chronic kidney disease    Seizures    HTN (hypertension)    Hyperlipemia    COVID-19    Atrial fibrillation    ESRD on dialysis    Former smoker    History of cocaine use    H/O aortic dissection    H/O aortic valve insufficiency    Mitral regurgitation          PAST SURGICAL HISTORY:  Aorta disorder    H/O colectomy    Status post double vessel coronary artery bypass    S/P AVR (aortic valve replacement)    S/P MVR (mitral valve replacement)          MEDICATIONS:        ALLERGIES:  penicillin (Other)        VITALS & I/Os:  Vital Signs Last 24 Hrs  T(C): 36.8 (12 Aug 2021 11:13), Max: 36.8 (12 Aug 2021 11:13)  T(F): 98.3 (12 Aug 2021 11:13), Max: 98.3 (12 Aug 2021 11:13)  HR: 71 (12 Aug 2021 11:13) (71 - 71)  BP: 184/79 (12 Aug 2021 11:13) (184/79 - 184/79)  BP(mean): --  RR: 20 (12 Aug 2021 11:13) (20 - 20)  SpO2: 98% (12 Aug 2021 11:13) (98% - 98%)    I&O's Summary        PHYSICAL EXAM:  General: No acute distress  Respiratory: Nonlabored  Cardiovascular: RRR  Abdominal: Soft, nondistended, nontender. No rebound or guarding. No organomegaly, no palpable mass.  Extremities: Warm  Vascular:  - RUE:  - LUE:  - RLE:  - LLE:       LABS:                        9.4    5.19  )-----------( 121      ( 12 Aug 2021 12:05 )             29.6     08-12    139  |  107  |  33.7<H>  ----------------------------<  88  4.3   |  21.0<L>  |  2.08<H>    Ca    9.3      12 Aug 2021 12:05  Mg     2.3     08-12    TPro  7.6  /  Alb  4.5  /  TBili  0.8  /  DBili  x   /  AST  15  /  ALT  23  /  AlkPhos  136<H>  08-12    Lactate:    PT/INR - ( 12 Aug 2021 12:05 )   PT: 14.9 sec;   INR: 1.30 ratio         PTT - ( 12 Aug 2021 12:05 )  PTT:40.9 sec                IMAGING:                                                                                               Vascular Surgery Consult    Consulting attending: Dr. Scotty Argueta      HPI:  60 y/o M w/ a PMHx of ESRD on HD on Tu/Thur/Sat via LUE AVG, CAD s/p remote PCI, AT/Aflutter, aortic insufficiency and mitral regurgitation s/p bioAVR and bioMVR, CABG, HFrEF (EF 40%), ascending aortic dissection s/p surgery complicated by CVA with residual left-sided weakness, colon resection for bowel resection s/p ostomy with reversal, and HTN who presents form Dialysis center with non-functioning LUE AVG, which was created on 1/29/21. It had been functioning well up until now. Patient was last dialyzed on Saturday, missing Tuesday. Today the graft was accessed and only dark clotted blood came. Usually patient bleeds well during dialysis stick. Dialysis nurse noted this and told patient to present to ED. No other complaints. No pain in LUE, or distal numbness, or tingling.         PAST MEDICAL HISTORY:  CHF (congestive heart failure)    CVA (cerebral vascular accident)    Chronic kidney disease    Seizures    HTN (hypertension)    Hyperlipemia    COVID-19    Atrial fibrillation    ESRD on dialysis    Former smoker    History of cocaine use    H/O aortic dissection    H/O aortic valve insufficiency    Mitral regurgitation          PAST SURGICAL HISTORY:  Aorta disorder    H/O colectomy    Status post double vessel coronary artery bypass    S/P AVR (aortic valve replacement)    S/P MVR (mitral valve replacement)          MEDICATIONS:        ALLERGIES:  penicillin (Other)        VITALS & I/Os:  Vital Signs Last 24 Hrs  T(C): 36.8 (12 Aug 2021 11:13), Max: 36.8 (12 Aug 2021 11:13)  T(F): 98.3 (12 Aug 2021 11:13), Max: 98.3 (12 Aug 2021 11:13)  HR: 71 (12 Aug 2021 11:13) (71 - 71)  BP: 184/79 (12 Aug 2021 11:13) (184/79 - 184/79)  BP(mean): --  RR: 20 (12 Aug 2021 11:13) (20 - 20)  SpO2: 98% (12 Aug 2021 11:13) (98% - 98%)    I&O's Summary        PHYSICAL EXAM:  General: No acute distress  Respiratory: Nonlabored  Cardiovascular: RRR  Abdominal: Soft, nondistended, nontender.   Extremities: Warm. Full ROM without issue.   Vascular: LUE AVG at forearm, looped. No palpable thrill or pulse. No ulcerations. Radial 2+ Sensation and motor function intact.       LABS:                        9.4    5.19  )-----------( 121      ( 12 Aug 2021 12:05 )             29.6     08-12    139  |  107  |  33.7<H>  ----------------------------<  88  4.3   |  21.0<L>  |  2.08<H>    Ca    9.3      12 Aug 2021 12:05  Mg     2.3     08-12    TPro  7.6  /  Alb  4.5  /  TBili  0.8  /  DBili  x   /  AST  15  /  ALT  23  /  AlkPhos  136<H>  08-12    Lactate:    PT/INR - ( 12 Aug 2021 12:05 )   PT: 14.9 sec;   INR: 1.30 ratio      PTT - ( 12 Aug 2021 12:05 )  PTT:40.9 sec      IMAGING:

## 2021-08-12 NOTE — ED ADULT TRIAGE NOTE - CHIEF COMPLAINT QUOTE
pt c/o palpitations and clogged fistula to Left AVF. hasn't had dialysis since Saturday. usually goes TTS

## 2021-08-12 NOTE — H&P ADULT - NSHPPHYSICALEXAM_GEN_ALL_CORE
PHYSICAL EXAM:    GENERAL: NAD, well-groomed,   HEAD:  Atraumatic, Normocephalic  EYES: EOMI, PERRLA, conjunctiva and sclera clear  ENMT: No tonsillar erythema, exudates, or enlargement; Moist mucous membranes, Good dentition,  NECK: Supple, No JVD, Normal thyroid  NERVOUS SYSTEM:  Alert & Oriented X3, Good concentration; Motor Strength 5/5 B/L upper and lower extremities;   CHEST/LUNG: Clear to percussion bilaterally; No rales, rhonchi, wheezing, or rubs  HEART: Regular rate and rhythm; No murmurs, rubs, or gallops  ABDOMEN: Soft, Nontender, Nondistended; Bowel sounds present  EXTREMITIES:  2+ Peripheral Pulses, LUE fistula   LYMPH: No lymphadenopathy noted  SKIN: No rashes or lesions

## 2021-08-12 NOTE — ED PROVIDER NOTE - ATTENDING CONTRIBUTION TO CARE
I, Chad Napier, performed a face to face bedside interview with this patient regarding history of present illness, review of symptoms and relevant past medical, social and family history.  I completed an independent physical examination. I have communicated the patient’s plan of care and disposition with the resident.  61 year old male with PMH afib, htn, esrd on HS T,TH,S went to dialysis and fistula not working. C/o mild palpitations but no chets pain, SOB  Gen: NAD, well appearing  CV: RRR  Pul: CTA b/l  Abd: Soft, non-distended, non-tender  Neuro: no focal deficits  Fistula clotted off, cannot be intervened on until next week, will be admitted, IR for permacath, dialysis tomorrow

## 2021-08-12 NOTE — H&P ADULT - ASSESSMENT
60 y/o M w/ PMHx ESRD on HD on Tu/Thur/Sat CAD s/p remote PCI, AT/Aflutter on eliquis, s/p CABG for aortic insufficiency and mitral regurgitation s/p bioAVR and bioMVR,  HFrEF (EF 40%), ascending aortic dissection s/p surgery complicated by CVA with residual left-sided weakness, colon resection for bowel resection s/p ostomy with reversal presents from home with malfunctioning fistula.

## 2021-08-12 NOTE — ED PROVIDER NOTE - PATIENT PORTAL LINK FT
You can access the FollowMyHealth Patient Portal offered by [facility] by registering at the following website: [email or web address]. By joining [employer]’s FollowMyHealth portal, you will also be able to view your health information using other applications (apps) compatible with our system.

## 2021-08-12 NOTE — CHART NOTE - NSCHARTNOTEFT_GEN_A_CORE
Called by RN after pt's HR down to 30s on monitor.  Reviewed chart, pt with extensive cardiac hx, recent d/c of amiodarone due to bradycardia, admitted for malfunctioning AVF.  Scheduled to go for permacath placement tomorrow, pending cardiac clearance as per MD note.  Pt was sleeping, asymptomatic, in no distress. Called by RN after pt's HR down to 30s on monitor.  Reviewed chart, pt with extensive cardiac hx, recent d/c of amiodarone due to bradycardia, admitted for malfunctioning AVF.  Scheduled to go for permacath placement tomorrow, pending cardiac clearance as per MD note.  Pt was sleeping, asymptomatic, in no distress.    /76 41 17 97.9 98% on RA  08-12    139  |  107  |  33.7<H>  ----------------------------<  88  4.3   |  21.0<L>  |  2.08<H>    Ca    9.3      12 Aug 2021 12:05  Mg     2.3     08-12    Stat EKG - Sinus Bradycardia ND interval <200ms;   On monitor, pt's HR 50-55  Metoprolol 25mg po BID d/c  RN to hold Procardia until cleared by MD  RN to continue w/ hydralazine for high BP  Continue to monitor and escalate prn

## 2021-08-12 NOTE — CONSULT NOTE ADULT - ASSESSMENT
60 y/o M w/ a complex PMH including ESRD on HD on Tu/Thur/Sat via LUE AVG presents with non-functioning AVG which has likely clotted off based on history and evaluation. No vascular compromise from this. Will need declotting of graft, though procedure is non-urgent. However, is without access. Nephrology consulted and patient in no urgent need for HD at this time.    - Will need declotting of AVG, likely next week.   - Patient may need longer-term access if not admitted.- Would call IR for PermCath placement  - If admitted and needs temporary access, will place while planning for declotting next week.     Plan discussed with Dr. Argueta who agrees.  60 y/o M w/ a complex PMH including ESRD on HD on Tu/Thur/Sat via LUE AVG presents with non-functioning AVG which has likely clotted off based on history and evaluation. No vascular compromise from this. Will need declotting of graft, though procedure is non-urgent. However, is without access. Nephrology consulted and patient in no urgent need for HD at this time.    - Will need declotting of AVG, likely next week.   - Patient will need longer term access until able to declot graft, permacath placement tomorrow morning in cath lab  - NPO at midnight, labs in AM. Gentle IVF given CHF history.   - COVID test    Patient seen and plan discussed with Dr. Argueta who agrees.

## 2021-08-12 NOTE — ED PROVIDER NOTE - CLINICAL SUMMARY MEDICAL DECISION MAKING FREE TEXT BOX
PT w/ ESRD on HD p/w blocked fistula and missed dialysis x5days. Plan to consult vascular for fistula recannulization. PT w/ ESRD on HD p/w blocked fistula and missed dialysis x5days. Denies AMS, N, V, D, CP, SOB at this time.  Plan to consult vascular for fistula recannulization.

## 2021-08-12 NOTE — CONSULT NOTE ADULT - ASSESSMENT
1) ESRD on HD  2) AV graft malfunction  3) Anemia of CKD  4) HTN   1) ESRD on HD  2) AV graft malfunction  3) Anemia of CKD  4) HTN    Pt euvolemic on exam; labs with grossly normal lytes  No urgent indication for HD  vascular surgery eval for possible intervention  Plan for HD tomorrow

## 2021-08-12 NOTE — H&P ADULT - HISTORY OF PRESENT ILLNESS
Patient is a 62 y/o M w/ PMHx ESRD on HD on Tu/Thur/Sat CAD s/p remote PCI, AT/Aflutter on eliquis, s/p CABG for aortic insufficiency and mitral regurgitation s/p bioAVR and bioMVR,  HFrEF (EF 40%), ascending aortic dissection s/p surgery complicated by CVA with residual left-sided weakness, colon resection for bowel resection s/p ostomy with reversal presents from home with malfunctioning fistula. Patient states he went to his outpatient site and couldnt start HD due to it being clogged. Patient also complains of intermittent fatigue and not feeling himself and was recently taken off amiodarone due to bradycardia. Patient denies any other complaints    patient seen by renal and vascular and the plan is to set up permacath placement in the am

## 2021-08-12 NOTE — ED PROVIDER NOTE - PHYSICAL EXAMINATION
VITAL SIGNS: I have reviewed nursing notes and confirm.  CONSTITUTIONAL:  in no acute distress.  SKIN: Skin exam is warm and dry, no rash.  HEAD: Normocephalic, atraumatic.  EYES: conjunctiva and sclera clear.  ENT: pink & moist mucosa, no pharyngeal erythema, no lymphadenopathy  NECK: Supple, non tender.    CARD: +murmer, +irregular rhythm, +elis  RESP: No wheezes, rales or rhonchi.   ABD:  soft, non-distended, non-tender;   EXT: w/ AVF on LEFT UE distal  NEURO: Baseline weakness of left leg; Otherwise Alert, oriented. No new focal deficits.    PSYCH: Cooperative, alert & oriented x3

## 2021-08-12 NOTE — ED ADULT NURSE NOTE - OBJECTIVE STATEMENT
pt alert and oriented x4 comes in c/o clogged L AVF last dialysis's saturday. pt usually goes T T S. RR even unlabored. pt c/o palpitations. pt educated on plan of care, pt able to successfully teach back plan of care to RN, RN will continue to reeducate pt during hospital stay.

## 2021-08-13 LAB
ALBUMIN SERPL ELPH-MCNC: 4.2 G/DL — SIGNIFICANT CHANGE UP (ref 3.3–5.2)
ALP SERPL-CCNC: 120 U/L — SIGNIFICANT CHANGE UP (ref 40–120)
ALT FLD-CCNC: 21 U/L — SIGNIFICANT CHANGE UP
AMMONIA BLD-MCNC: 17 UMOL/L — SIGNIFICANT CHANGE UP (ref 11–55)
ANION GAP SERPL CALC-SCNC: 13 MMOL/L — SIGNIFICANT CHANGE UP (ref 5–17)
AST SERPL-CCNC: 14 U/L — SIGNIFICANT CHANGE UP
BASOPHILS # BLD AUTO: 0.02 K/UL — SIGNIFICANT CHANGE UP (ref 0–0.2)
BASOPHILS NFR BLD AUTO: 0.5 % — SIGNIFICANT CHANGE UP (ref 0–2)
BILIRUB SERPL-MCNC: 0.9 MG/DL — SIGNIFICANT CHANGE UP (ref 0.4–2)
BUN SERPL-MCNC: 34.1 MG/DL — HIGH (ref 8–20)
CALCIUM SERPL-MCNC: 9 MG/DL — SIGNIFICANT CHANGE UP (ref 8.6–10.2)
CHLORIDE SERPL-SCNC: 108 MMOL/L — HIGH (ref 98–107)
CO2 SERPL-SCNC: 20 MMOL/L — LOW (ref 22–29)
COVID-19 SPIKE DOMAIN AB INTERP: POSITIVE
COVID-19 SPIKE DOMAIN ANTIBODY RESULT: >250 U/ML — HIGH
CREAT SERPL-MCNC: 2.21 MG/DL — HIGH (ref 0.5–1.3)
EOSINOPHIL # BLD AUTO: 0.3 K/UL — SIGNIFICANT CHANGE UP (ref 0–0.5)
EOSINOPHIL NFR BLD AUTO: 7.2 % — HIGH (ref 0–6)
GLUCOSE SERPL-MCNC: 80 MG/DL — SIGNIFICANT CHANGE UP (ref 70–99)
HCT VFR BLD CALC: 30.9 % — LOW (ref 39–50)
HGB BLD-MCNC: 9.8 G/DL — LOW (ref 13–17)
IMM GRANULOCYTES NFR BLD AUTO: 0 % — SIGNIFICANT CHANGE UP (ref 0–1.5)
INR BLD: 1.34 RATIO — HIGH (ref 0.88–1.16)
LYMPHOCYTES # BLD AUTO: 1.02 K/UL — SIGNIFICANT CHANGE UP (ref 1–3.3)
LYMPHOCYTES # BLD AUTO: 24.4 % — SIGNIFICANT CHANGE UP (ref 13–44)
MAGNESIUM SERPL-MCNC: 2.4 MG/DL — SIGNIFICANT CHANGE UP (ref 1.6–2.6)
MCHC RBC-ENTMCNC: 30.2 PG — SIGNIFICANT CHANGE UP (ref 27–34)
MCHC RBC-ENTMCNC: 31.7 GM/DL — LOW (ref 32–36)
MCV RBC AUTO: 95.1 FL — SIGNIFICANT CHANGE UP (ref 80–100)
MONOCYTES # BLD AUTO: 0.46 K/UL — SIGNIFICANT CHANGE UP (ref 0–0.9)
MONOCYTES NFR BLD AUTO: 11 % — SIGNIFICANT CHANGE UP (ref 2–14)
NEUTROPHILS # BLD AUTO: 2.38 K/UL — SIGNIFICANT CHANGE UP (ref 1.8–7.4)
NEUTROPHILS NFR BLD AUTO: 56.9 % — SIGNIFICANT CHANGE UP (ref 43–77)
PLATELET # BLD AUTO: 128 K/UL — LOW (ref 150–400)
POTASSIUM SERPL-MCNC: 4.4 MMOL/L — SIGNIFICANT CHANGE UP (ref 3.5–5.3)
POTASSIUM SERPL-SCNC: 4.4 MMOL/L — SIGNIFICANT CHANGE UP (ref 3.5–5.3)
PROT SERPL-MCNC: 7.1 G/DL — SIGNIFICANT CHANGE UP (ref 6.6–8.7)
PROTHROM AB SERPL-ACNC: 15.3 SEC — HIGH (ref 10.6–13.6)
RBC # BLD: 3.25 M/UL — LOW (ref 4.2–5.8)
RBC # FLD: 13.3 % — SIGNIFICANT CHANGE UP (ref 10.3–14.5)
SARS-COV-2 IGG+IGM SERPL QL IA: >250 U/ML — HIGH
SARS-COV-2 IGG+IGM SERPL QL IA: POSITIVE
SODIUM SERPL-SCNC: 141 MMOL/L — SIGNIFICANT CHANGE UP (ref 135–145)
WBC # BLD: 4.18 K/UL — SIGNIFICANT CHANGE UP (ref 3.8–10.5)
WBC # FLD AUTO: 4.18 K/UL — SIGNIFICANT CHANGE UP (ref 3.8–10.5)

## 2021-08-13 PROCEDURE — 99233 SBSQ HOSP IP/OBS HIGH 50: CPT

## 2021-08-13 PROCEDURE — 99152 MOD SED SAME PHYS/QHP 5/>YRS: CPT

## 2021-08-13 PROCEDURE — 93010 ELECTROCARDIOGRAM REPORT: CPT

## 2021-08-13 PROCEDURE — 90937 HEMODIALYSIS REPEATED EVAL: CPT

## 2021-08-13 PROCEDURE — 76937 US GUIDE VASCULAR ACCESS: CPT | Mod: 26,59

## 2021-08-13 PROCEDURE — 36561 INSERT TUNNELED CV CATH: CPT

## 2021-08-13 RX ORDER — SODIUM CHLORIDE 9 MG/ML
10 INJECTION INTRAMUSCULAR; INTRAVENOUS; SUBCUTANEOUS
Refills: 0 | Status: DISCONTINUED | OUTPATIENT
Start: 2021-08-13 | End: 2021-08-14

## 2021-08-13 RX ORDER — CHLORHEXIDINE GLUCONATE 213 G/1000ML
1 SOLUTION TOPICAL
Refills: 0 | Status: DISCONTINUED | OUTPATIENT
Start: 2021-08-13 | End: 2021-08-14

## 2021-08-13 RX ADMIN — LEVETIRACETAM 750 MILLIGRAM(S): 250 TABLET, FILM COATED ORAL at 21:16

## 2021-08-13 RX ADMIN — Medication 81 MILLIGRAM(S): at 11:56

## 2021-08-13 RX ADMIN — SEVELAMER CARBONATE 800 MILLIGRAM(S): 2400 POWDER, FOR SUSPENSION ORAL at 17:06

## 2021-08-13 RX ADMIN — ATORVASTATIN CALCIUM 40 MILLIGRAM(S): 80 TABLET, FILM COATED ORAL at 21:16

## 2021-08-13 RX ADMIN — CHLORHEXIDINE GLUCONATE 1 APPLICATION(S): 213 SOLUTION TOPICAL at 11:56

## 2021-08-13 RX ADMIN — SEVELAMER CARBONATE 800 MILLIGRAM(S): 2400 POWDER, FOR SUSPENSION ORAL at 11:56

## 2021-08-13 RX ADMIN — GABAPENTIN 100 MILLIGRAM(S): 400 CAPSULE ORAL at 21:16

## 2021-08-13 RX ADMIN — LEVETIRACETAM 750 MILLIGRAM(S): 250 TABLET, FILM COATED ORAL at 05:16

## 2021-08-13 NOTE — BRIEF OPERATIVE NOTE - NSICDXBRIEFPROCEDURE_GEN_ALL_CORE_FT
PROCEDURES:  Insertion of tunneled dialysis catheter with imaging guidance 13-Aug-2021 08:42:16  Edmond Lee

## 2021-08-13 NOTE — CHART NOTE - NSCHARTNOTEFT_GEN_A_CORE
Department of Cardiology  [facility]/[place] [address] [address] [facility]  Telephone: [phone]. Fax:[phone]    Pre- Procedure Note: Permacath Insertion    Narrative:  62 y/o M w/ PMHx ESRD on HD on /Thur/Sat, CAD s/p remote PCI, AT/Aflutter (on eliquis recent DCCV 2021 with conversion to NSR), s/p CABGx2 (RSVG-OM and RPDA) for aortic insufficiency and mitral regurgitation s/p bioAVR (25MM CE [id number]) and bioMVR (31MM Magna Ease),  HFrEF (EF 40%), ascending aortic dissection s/p surgery complicated by CVA with residual left-sided weakness, colon resection for bowel resection s/p ostomy with reversal presents from home with malfunctioning fistula. Patient states he went to his outpatient site and was unable to receive HD due to flow issues. Patient also complains of intermittent fatigue and not feeling himself and was recently taken off amiodarone due to bradycardia; procardia held this am. He is asymptomatic of his bradycardia and follows with Dr. Frankel (Interventional Cardio) and Dr. Virk (EP); he had an MCOT placed revealing no afib; sinus with PVCs, however, ablation was discussed and patient was amenable (discussed during appt 21)  He presents today in anticipation of an elective permacath insertion with Dr. Argueta.    	  MEDICATIONS:  hydrALAZINE Injectable 10 milliGRAM(s) IV Push every 6 hours PRN  NIFEdipine XL 60 milliGRAM(s) Oral daily  acetaminophen   Tablet .. 650 milliGRAM(s) Oral every 6 hours PRNb  gabapentin 100 milliGRAM(s) Oral at bedtime  levETIRAcetam 750 milliGRAM(s) Oral two times a day  pantoprazole    Tablet 40 milliGRAM(s) Oral before breakfast  atorvastatin 40 milliGRAM(s) Oral at bedtime  aspirin enteric coated 81 milliGRAM(s) Oral daily      ASA: 3   Mallampati: 2  Creatinine: 2.08  GFR: 34    PHYSICAL EXAM:  T(C): 36.8 (21 @ 04:52), Max: 36.8 (21 @ 11:13)  HR: 45 (21 @ 04:52) (39 - 75)  BP: 148/73 (21 @ 04:52) (148/73 - 184/79)  RR: 18 (21 @ 04:52) (16 - 20)ca  SpO2: 98% (21 @ 04:52) (96% - 99%)  Wt(kg): --    I&O's Summary    Daily Height in cm: 175.26 (12 Aug 2021 11:39)    Daily Weight in k.6 (12 Aug 2021 22:25)    Constitutional: A & O x 3  HEENT:   Normal oral mucosa, PERRL, EOMI	  Cardiovascular: Normal S1 S2, No JVD  Respiratory: Lungs clear to auscultation	  Gastrointestinal:  Soft, Non-tender, + BS	  Neurologic: Non-focal  Extremities: Normal range of motion, No clubbing, cyanosis or edema  Vascular: Peripheral pulses palpable 2+ bilaterally, LUE AVF      TELEMETRY: sinus bradycardia 30-50s    LABS:	                         9.4    5.19  )-----------( 121      ( 12 Aug 2021 12:05 )             29.6         139  |  107  |  33.7<H>  ----------------------------<  88  4.3   |  21.0<L>  |  2.08<H>    Ca    9.3      12 Aug 2021 12:05  Mg     2.3         TPro  7.6  /  Alb  4.5  /  TBili  0.8  /  DBili  x   /  AST  15  /  ALT  23  /  AlkPhos  136<H>        TSH: Thyroid Stimulating Hormone, Serum: 2.16 uIU/mL ( @ 12:05)      ASSESSMENT:62 y/o M w/ PMHx ESRD on HD on /Thur/Sat CAD s/p remote PCI, AT/Aflutter on eliquis recent DCCV 2021, s/p CABGx2 for aortic insufficiency and mitral regurgitation s/p bioAVR and bioMVR,  HFrEF (EF 40%), ascending aortic dissection s/p surgery complicated by CVA with residual left-sided weakness, colon resection for bowel resection s/p ostomy with reversal presents from home with malfunctioning fistula; he presents today in anticipation of a permacath insertion due to flow issues during HD where he was unable to receive it; his last session was Saturday.       -consent  obtained per Dr. Argueta  -procedure discussed with patient; risks and benefits explained, questions answered  -labs and ECG reviewed

## 2021-08-13 NOTE — BRIEF OPERATIVE NOTE - OPERATION/FINDINGS
Right IJ permacath via right chest wall. Cannulation, dilation, and catheter placement done under fluoro guidance. In correct position. No complications, well tolerated.

## 2021-08-13 NOTE — PROVIDER CONTACT NOTE (OTHER) - ASSESSMENT
Pt AAOx4, no s/s acute distress. vital signs otherwise stable.
No s/s acute distress, no c/o pain or discomfort. sleeping comfortably

## 2021-08-13 NOTE — PROVIDER CONTACT NOTE (OTHER) - SITUATION
on cardiac monitor patient in junctional rhythm rate 38-39.
on review of cardiac monitoring alarms reviewed with MT reported 2.2 second pause and Bradycardia at rate 39

## 2021-08-13 NOTE — CHART NOTE - NSCHARTNOTEFT_GEN_A_CORE
Post Operative Check    Patient is post op from a Insertion of tunneled dialysis catheter with imaging guidance and is found sitting in chair in NAD. Pain is well controlled. Denies nausea, vomiting, fevers, chills, chest pain, or SOB. Tolerating a diet. Awaiting dialysis. Pending plan from cardiology for known asymptomatic bradycardia.     Vitals    T(C): 36.8 (08-13-21 @ 20:35), Max: 36.8 (08-13-21 @ 04:52)  HR: 73 (08-13-21 @ 20:35) (40 - 73)  BP: 162/72 (08-13-21 @ 20:35) (140/68 - 171/74)  RR: 18 (08-13-21 @ 20:35) (15 - 18)  SpO2: 100% (08-13-21 @ 20:35) (97% - 100%)  Wt(kg): --      08-13 @ 07:01  -  08-14 @ 00:48  --------------------------------------------------------  IN:    Other (mL): 800 mL  Total IN: 800 mL    OUT:    Other (mL): 1300 mL  Total OUT: 1300 mL    Total NET: -500 mL          Labs                        9.8    4.18  )-----------( 128      ( 13 Aug 2021 08:36 )             30.9       CBC Full  -  ( 13 Aug 2021 08:36 )  WBC Count : 4.18 K/uL  Hemoglobin : 9.8 g/dL  Hematocrit : 30.9 %  Platelet Count - Automated : 128 K/uL  Mean Cell Volume : 95.1 fl  Mean Cell Hemoglobin : 30.2 pg  Mean Cell Hemoglobin Concentration : 31.7 gm/dL  Auto Neutrophil # : 2.38 K/uL  Auto Lymphocyte # : 1.02 K/uL  Auto Monocyte # : 0.46 K/uL  Auto Eosinophil # : 0.30 K/uL  Auto Basophil # : 0.02 K/uL  Auto Neutrophil % : 56.9 %  Auto Lymphocyte % : 24.4 %  Auto Monocyte % : 11.0 %  Auto Eosinophil % : 7.2 %  Auto Basophil % : 0.5 %      Physical Exam  General: NAD AAOx3   Cards: RRR  Chest: Right side permacath insertion site without bleeding or drainage. Minor blood under dressing, not active. Appropriately, minimally tender.   Resp: Unlabored, no conversational dyspnea  Abdomen: Soft NT, ND  Ext:. LUE protected, AVG without thrill.          Patient is a 61y old Male w/ ESRD and non-function LUE AVG now s/p permacath placement via R IJ. Pain controlled. No complaints.    - Permacath okay for use  - To follow up HD  - Patient will undergo declotting of AVG next week, tentatively 8/18.

## 2021-08-13 NOTE — PROVIDER CONTACT NOTE (OTHER) - BACKGROUND
Pt reported to [facility] in ED received with HR in 40's. Per patient he was taking amiodarone that is now discontinued in the past and has been having bradycardia since.
Pt noted with bradycardia, rate  38 to 44 overnight and EKG done

## 2021-08-13 NOTE — PRE PROCEDURE NOTE - PRE PROCEDURE EVALUATION
Surgery Preop Note    Patient is a 61y old Male who presents with a chronic kidney disease chief complaint of malfunctioning AVF  (12 Aug 2021 17:11)    Diagnosis: Occluded LUE AVG   Procedure: Permacath   Surgeon: Dr Argueta                          9.4    5.19  )-----------( 121      ( 12 Aug 2021 12:05 )             29.6     08-12    139  |  107  |  33.7<H>  ----------------------------<  88  4.3   |  21.0<L>  |  2.08<H>    Ca    9.3      12 Aug 2021 12:05  Mg     2.3     08-12    TPro  7.6  /  Alb  4.5  /  TBili  0.8  /  DBili  x   /  AST  15  /  ALT  23  /  AlkPhos  136<H>  08-12    PT/INR - ( 12 Aug 2021 12:05 )   PT: 14.9 sec;   INR: 1.30 ratio         PTT - ( 12 Aug 2021 12:05 )  PTT:40.9 sec      [x] CBC  [x ] BMP  [x] PT/PTT/INR  [x ] Chest X-ray 8/12  [x ] NPO/IVF  [x ] Added on to OR Schedule  [x] COVID 8/12    Assessment & Plan:  61y Male with occluded LUE AVG, need HD   -Permacath insertion

## 2021-08-13 NOTE — PROGRESS NOTE ADULT - PROBLEM SELECTOR PLAN 4
hold eliquis,   1 dose lovenox given yesterday  resume lovenox in anticipation of de clotting next week

## 2021-08-14 ENCOUNTER — TRANSCRIPTION ENCOUNTER (OUTPATIENT)
Age: 61
End: 2021-08-14

## 2021-08-14 VITALS
SYSTOLIC BLOOD PRESSURE: 153 MMHG | HEART RATE: 71 BPM | TEMPERATURE: 98 F | OXYGEN SATURATION: 95 % | RESPIRATION RATE: 18 BRPM | DIASTOLIC BLOOD PRESSURE: 83 MMHG

## 2021-08-14 LAB
ALBUMIN SERPL ELPH-MCNC: 4 G/DL — SIGNIFICANT CHANGE UP (ref 3.3–5.2)
ALP SERPL-CCNC: 118 U/L — SIGNIFICANT CHANGE UP (ref 40–120)
ALT FLD-CCNC: 18 U/L — SIGNIFICANT CHANGE UP
ANION GAP SERPL CALC-SCNC: 11 MMOL/L — SIGNIFICANT CHANGE UP (ref 5–17)
AST SERPL-CCNC: 11 U/L — SIGNIFICANT CHANGE UP
BASOPHILS # BLD AUTO: 0.03 K/UL — SIGNIFICANT CHANGE UP (ref 0–0.2)
BASOPHILS NFR BLD AUTO: 0.6 % — SIGNIFICANT CHANGE UP (ref 0–2)
BILIRUB SERPL-MCNC: 0.8 MG/DL — SIGNIFICANT CHANGE UP (ref 0.4–2)
BUN SERPL-MCNC: 25.2 MG/DL — HIGH (ref 8–20)
CALCIUM SERPL-MCNC: 8.9 MG/DL — SIGNIFICANT CHANGE UP (ref 8.6–10.2)
CHLORIDE SERPL-SCNC: 106 MMOL/L — SIGNIFICANT CHANGE UP (ref 98–107)
CO2 SERPL-SCNC: 23 MMOL/L — SIGNIFICANT CHANGE UP (ref 22–29)
CREAT SERPL-MCNC: 1.74 MG/DL — HIGH (ref 0.5–1.3)
EOSINOPHIL # BLD AUTO: 0.37 K/UL — SIGNIFICANT CHANGE UP (ref 0–0.5)
EOSINOPHIL NFR BLD AUTO: 6.9 % — HIGH (ref 0–6)
GLUCOSE SERPL-MCNC: 87 MG/DL — SIGNIFICANT CHANGE UP (ref 70–99)
HBV SURFACE AG SER-ACNC: SIGNIFICANT CHANGE UP
HCT VFR BLD CALC: 29.4 % — LOW (ref 39–50)
HGB BLD-MCNC: 9.8 G/DL — LOW (ref 13–17)
IMM GRANULOCYTES NFR BLD AUTO: 0 % — SIGNIFICANT CHANGE UP (ref 0–1.5)
LYMPHOCYTES # BLD AUTO: 1.13 K/UL — SIGNIFICANT CHANGE UP (ref 1–3.3)
LYMPHOCYTES # BLD AUTO: 21 % — SIGNIFICANT CHANGE UP (ref 13–44)
MAGNESIUM SERPL-MCNC: 2.1 MG/DL — SIGNIFICANT CHANGE UP (ref 1.6–2.6)
MCHC RBC-ENTMCNC: 30.3 PG — SIGNIFICANT CHANGE UP (ref 27–34)
MCHC RBC-ENTMCNC: 33.3 GM/DL — SIGNIFICANT CHANGE UP (ref 32–36)
MCV RBC AUTO: 91 FL — SIGNIFICANT CHANGE UP (ref 80–100)
MONOCYTES # BLD AUTO: 0.41 K/UL — SIGNIFICANT CHANGE UP (ref 0–0.9)
MONOCYTES NFR BLD AUTO: 7.6 % — SIGNIFICANT CHANGE UP (ref 2–14)
NEUTROPHILS # BLD AUTO: 3.44 K/UL — SIGNIFICANT CHANGE UP (ref 1.8–7.4)
NEUTROPHILS NFR BLD AUTO: 63.9 % — SIGNIFICANT CHANGE UP (ref 43–77)
PLATELET # BLD AUTO: 127 K/UL — LOW (ref 150–400)
POTASSIUM SERPL-MCNC: 3.8 MMOL/L — SIGNIFICANT CHANGE UP (ref 3.5–5.3)
POTASSIUM SERPL-SCNC: 3.8 MMOL/L — SIGNIFICANT CHANGE UP (ref 3.5–5.3)
PROT SERPL-MCNC: 7.2 G/DL — SIGNIFICANT CHANGE UP (ref 6.6–8.7)
RBC # BLD: 3.23 M/UL — LOW (ref 4.2–5.8)
RBC # FLD: 13.2 % — SIGNIFICANT CHANGE UP (ref 10.3–14.5)
SODIUM SERPL-SCNC: 140 MMOL/L — SIGNIFICANT CHANGE UP (ref 135–145)
WBC # BLD: 5.38 K/UL — SIGNIFICANT CHANGE UP (ref 3.8–10.5)
WBC # FLD AUTO: 5.38 K/UL — SIGNIFICANT CHANGE UP (ref 3.8–10.5)

## 2021-08-14 PROCEDURE — 80053 COMPREHEN METABOLIC PANEL: CPT

## 2021-08-14 PROCEDURE — 93005 ELECTROCARDIOGRAM TRACING: CPT

## 2021-08-14 PROCEDURE — 99239 HOSP IP/OBS DSCHRG MGMT >30: CPT

## 2021-08-14 PROCEDURE — C1750: CPT

## 2021-08-14 PROCEDURE — 99261: CPT

## 2021-08-14 PROCEDURE — 71045 X-RAY EXAM CHEST 1 VIEW: CPT

## 2021-08-14 PROCEDURE — 85610 PROTHROMBIN TIME: CPT

## 2021-08-14 PROCEDURE — 86901 BLOOD TYPING SEROLOGIC RH(D): CPT

## 2021-08-14 PROCEDURE — 84443 ASSAY THYROID STIM HORMONE: CPT

## 2021-08-14 PROCEDURE — 86880 COOMBS TEST DIRECT: CPT

## 2021-08-14 PROCEDURE — C1769: CPT

## 2021-08-14 PROCEDURE — 84436 ASSAY OF TOTAL THYROXINE: CPT

## 2021-08-14 PROCEDURE — 86769 SARS-COV-2 COVID-19 ANTIBODY: CPT

## 2021-08-14 PROCEDURE — 87635 SARS-COV-2 COVID-19 AMP PRB: CPT

## 2021-08-14 PROCEDURE — 85730 THROMBOPLASTIN TIME PARTIAL: CPT

## 2021-08-14 PROCEDURE — 82140 ASSAY OF AMMONIA: CPT

## 2021-08-14 PROCEDURE — 86900 BLOOD TYPING SEROLOGIC ABO: CPT

## 2021-08-14 PROCEDURE — 85025 COMPLETE CBC W/AUTO DIFF WBC: CPT

## 2021-08-14 PROCEDURE — 36558 INSERT TUNNELED CV CATH: CPT

## 2021-08-14 PROCEDURE — 77001 FLUOROGUIDE FOR VEIN DEVICE: CPT

## 2021-08-14 PROCEDURE — 76937 US GUIDE VASCULAR ACCESS: CPT

## 2021-08-14 PROCEDURE — 86850 RBC ANTIBODY SCREEN: CPT

## 2021-08-14 PROCEDURE — 99233 SBSQ HOSP IP/OBS HIGH 50: CPT

## 2021-08-14 PROCEDURE — 83735 ASSAY OF MAGNESIUM: CPT

## 2021-08-14 PROCEDURE — 86870 RBC ANTIBODY IDENTIFICATION: CPT

## 2021-08-14 PROCEDURE — 85027 COMPLETE CBC AUTOMATED: CPT

## 2021-08-14 PROCEDURE — 99285 EMERGENCY DEPT VISIT HI MDM: CPT

## 2021-08-14 PROCEDURE — 36415 COLL VENOUS BLD VENIPUNCTURE: CPT

## 2021-08-14 PROCEDURE — 87340 HEPATITIS B SURFACE AG IA: CPT

## 2021-08-14 PROCEDURE — C1894: CPT

## 2021-08-14 RX ADMIN — CHLORHEXIDINE GLUCONATE 1 APPLICATION(S): 213 SOLUTION TOPICAL at 05:16

## 2021-08-14 RX ADMIN — PANTOPRAZOLE SODIUM 40 MILLIGRAM(S): 20 TABLET, DELAYED RELEASE ORAL at 05:12

## 2021-08-14 RX ADMIN — SEVELAMER CARBONATE 800 MILLIGRAM(S): 2400 POWDER, FOR SUSPENSION ORAL at 11:33

## 2021-08-14 RX ADMIN — LEVETIRACETAM 750 MILLIGRAM(S): 250 TABLET, FILM COATED ORAL at 05:12

## 2021-08-14 RX ADMIN — Medication 81 MILLIGRAM(S): at 11:33

## 2021-08-14 RX ADMIN — SEVELAMER CARBONATE 800 MILLIGRAM(S): 2400 POWDER, FOR SUSPENSION ORAL at 08:39

## 2021-08-14 RX ADMIN — Medication 60 MILLIGRAM(S): at 08:39

## 2021-08-14 NOTE — DISCHARGE NOTE PROVIDER - NSDCMRMEDTOKEN_GEN_ALL_CORE_FT
ascorbic acid 500 mg oral tablet: 1 tab(s) orally once a day  aspirin 81 mg oral delayed release tablet: 1 tab(s) orally once a day  atorvastatin 40 mg oral tablet: 1 tab(s) orally once a day (at bedtime)  Eliquis 5 mg oral tablet: 1  orally once a day  gabapentin 100 mg oral capsule: 1 cap(s) orally once a day (at bedtime)  levETIRAcetam 750 mg oral tablet: 1 tab(s) orally 2 times a day  losartan 25 mg oral tablet: 1 tab(s) orally once a day  metoprolol succinate 50 mg oral tablet, extended release: 1 tab(s) orally once a day   pantoprazole 40 mg oral delayed release tablet: 1 tab(s) orally once a day  sevelamer hydrochloride 800 mg oral tablet: 1 tab(s) orally 2 times a day (with meals)  Tab-A-Russel oral tablet: 1 tab(s) orally once a day  torsemide 20 mg oral tablet: 1 tab(s) orally every other day   take on MONDAYS, FRIDAYS AND SUNDAYS

## 2021-08-14 NOTE — DISCHARGE NOTE PROVIDER - CARE PROVIDER_API CALL
ManvarSingh, Pallavi B (MD)  Vascular Surgery  [address] [facility] [facility], [address] [place] [address]  Phone: [phone]  Fax: [phone]  Established Patient  Follow Up Time: 1 week    Laurent Morales)  Medicine  Nephrology  [address] [place] [id number]  Phone: [phone]  Fax: [phone]  Established Patient  Follow Up Time: 1-3 days

## 2021-08-14 NOTE — PROGRESS NOTE ADULT - ASSESSMENT
1) ESRD on HD  2) AV graft malfunction  3) Anemia of CKD  4) HTN    Resume HD TTS schedule  Plan for AVG declotting on 08/18.
Patient is a 61y old Male w/ ESRD and non-function LUE AVG now s/p permacath placement via R IJ. Tolerating HD Pain controlled. No complaints. Will need intervention on AVG    - Patient will undergo declotting of AVG next week, tentatively 8/18.  - Okay for discharge from vascular standpoint. Can return for elective procedure at above date.       
62 y/o M w/ PMHx ESRD on HD on Tu/Thur/Sat CAD s/p remote PCI, AT/Aflutter on eliquis, s/p CABG for aortic insufficiency and mitral regurgitation s/p bioAVR and bioMVR,  HFrEF (EF 40%), ascending aortic dissection s/p surgery complicated by CVA with residual left-sided weakness, colon resection for bowel resection s/p ostomy with reversal presents from home with malfunctioning fistula.
1) ESRD on HD  2) AV graft malfunction  3) Anemia of CKD  4) HTN    HD today  Vascular eval  dw Dr Gardner

## 2021-08-14 NOTE — DISCHARGE NOTE PROVIDER - NSDCFUSCHEDAPPT_GEN_ALL_CORE_FT
JONATHAN GIBSON ; 08/30/2021 ; [facility] [address] JONATHAN Cherry ; 08/30/2021 ; [facility] [address] JONATHAN Cherry ; 08/30/2021 ; [facility] [address] [facility] JONATHAN Cherry ; 08/30/2021 ; [facility] [address] [facility]

## 2021-08-14 NOTE — DISCHARGE NOTE NURSING/CASE MANAGEMENT/SOCIAL WORK - PATIENT PORTAL LINK FT
You can access the FollowMyHealth Patient Portal offered by [facility] by registering at the following website: [email or web address]. By joining [employer]’s FollowMyHealth portal, you will also be able to view your health information using other applications (apps) compatible with our system.

## 2021-08-14 NOTE — DISCHARGE NOTE PROVIDER - CARE PROVIDERS DIRECT ADDRESSES
[email or web address]

## 2021-08-14 NOTE — PROGRESS NOTE ADULT - SUBJECTIVE AND OBJECTIVE BOX
JONATHAN GIBSON    [id number]    61y      Male    INTERVAL HPI/OVERNIGHT EVENTS:     off service note  Patient is a 62 y/o M w/ PMHx ESRD on HD on Tu/Thur/Sat CAD s/p remote PCI, AT/Aflutter on eliquis, s/p CABG for aortic insufficiency and mitral regurgitation s/p bioAVR and bioMVR,  HFrEF (EF 40%), ascending aortic dissection s/p surgery complicated by CVA with residual left-sided weakness, colon resection for bowel resection s/p ostomy with reversal presents from home with malfunctioning fistula. Patient seen by renal, vascular and cardio in consult.     plan is to place a permacath on 8.13 and perform declotting next week     overnight, patient had asymptomatic bradycardia and of note patient states he was taken off amio in the near past.     REVIEW OF SYSTEMS:    CONSTITUTIONAL: No fever, weight loss, or fatigue  RESPIRATORY: No cough, wheezing, hemoptysis; No shortness of breath  CARDIOVASCULAR: No chest pain, palpitations  GASTROINTESTINAL: No abdominal or epigastric pain. No nausea, vomiting  NEUROLOGICAL: No headaches, memory loss, loss of strength.  MISCELLANEOUS:      Vital Signs Last 24 Hrs  T(C): 36.8 (13 Aug 2021 04:52), Max: 36.8 (12 Aug 2021 11:13)  T(F): 98.2 (13 Aug 2021 04:52), Max: 98.3 (12 Aug 2021 11:13)  HR: 45 (13 Aug 2021 04:52) (39 - 75)  BP: 148/73 (13 Aug 2021 04:52) (148/73 - 184/79)  BP(mean): --  RR: 18 (13 Aug 2021 04:52) (16 - 20)  SpO2: 98% (13 Aug 2021 04:52) (96% - 99%)    PHYSICAL EXAM:  GENERAL: NAD, well-groomed,   HEAD:  Atraumatic, Normocephalic  EYES: EOMI, PERRLA, conjunctiva and sclera clear  ENMT: No tonsillar erythema, exudates, or enlargement; Moist mucous membranes, Good dentition,  NECK: Supple, No JVD, Normal thyroid  NERVOUS SYSTEM:  Alert & Oriented X3, Good concentration; Motor Strength 5/5 B/L upper and lower extremities;   CHEST/LUNG: Clear to percussion bilaterally; No rales, rhonchi, wheezing, or rubs  HEART: Regular rate and rhythm; No murmurs, rubs, or gallops  ABDOMEN: Soft, Nontender, Nondistended; Bowel sounds present  EXTREMITIES:  2+ Peripheral Pulses, LUE fistula   LYMPH: No lymphadenopathy noted  SKIN: No rashes or lesions    LABS:                        9.4    5.19  )-----------( 121      ( 12 Aug 2021 12:05 )             29.6     08-12    139  |  107  |  33.7<H>  ----------------------------<  88  4.3   |  21.0<L>  |  2.08<H>    Ca    9.3      12 Aug 2021 12:05  Mg     2.3     08-12    TPro  7.6  /  Alb  4.5  /  TBili  0.8  /  DBili  x   /  AST  15  /  ALT  23  /  AlkPhos  136<H>  08-12    PT/INR - ( 12 Aug 2021 12:05 )   PT: 14.9 sec;   INR: 1.30 ratio         PTT - ( 12 Aug 2021 12:05 )  PTT:40.9 sec        MEDICATIONS  (STANDING):  aspirin enteric coated 81 milliGRAM(s) Oral daily  atorvastatin 40 milliGRAM(s) Oral at bedtime  clindamycin IVPB 600 milliGRAM(s) IV Intermittent once  gabapentin 100 milliGRAM(s) Oral at bedtime  levETIRAcetam 750 milliGRAM(s) Oral two times a day  NIFEdipine XL 60 milliGRAM(s) Oral daily  pantoprazole    Tablet 40 milliGRAM(s) Oral before breakfast  sevelamer carbonate 800 milliGRAM(s) Oral three times a day with meals    MEDICATIONS  (PRN):  acetaminophen   Tablet .. 650 milliGRAM(s) Oral every 6 hours PRN Temp greater or equal to 38C (100.4F), Mild Pain (1 - 3)  hydrALAZINE Injectable 10 milliGRAM(s) IV Push every 6 hours PRN for sbp abvoe 160 mmhg      RADIOLOGY & ADDITIONAL TESTS:  
HPI/OVERNIGHT EVENTS:  No acute overnight events. Vital signs stable. POD1 from permacath placement at [facility], tolerated HD last night. Feels well, no new complaints. Denies nausea, vomiting, fever, chills, chest pain, shortness of breath, or any new or concerning symptoms.     MEDICATIONS  (STANDING):  aspirin enteric coated 81 milliGRAM(s) Oral daily  atorvastatin 40 milliGRAM(s) Oral at bedtime  chlorhexidine 4% Liquid 1 Application(s) Topical <User Schedule>  clindamycin IVPB 600 milliGRAM(s) IV Intermittent once  gabapentin 100 milliGRAM(s) Oral at bedtime  levETIRAcetam 750 milliGRAM(s) Oral two times a day  NIFEdipine XL 60 milliGRAM(s) Oral daily  pantoprazole    Tablet 40 milliGRAM(s) Oral before breakfast  sevelamer carbonate 800 milliGRAM(s) Oral three times a day with meals    MEDICATIONS  (PRN):  acetaminophen   Tablet .. 650 milliGRAM(s) Oral every 6 hours PRN Temp greater or equal to 38C (100.4F), Mild Pain (1 - 3)  hydrALAZINE Injectable 10 milliGRAM(s) IV Push every 6 hours PRN for sbp abvoe 160 mmhg  sodium chloride 0.9% lock flush 10 milliLiter(s) IV Push every 1 hour PRN Pre/post blood products, medications, blood draw, and to maintain line patency      Vital Signs Last 24 Hrs  T(C): 36.3 (14 Aug 2021 04:20), Max: 36.8 (13 Aug 2021 16:09)  T(F): 97.4 (14 Aug 2021 04:20), Max: 98.3 (13 Aug 2021 20:35)  HR: 62 (14 Aug 2021 04:20) (40 - 73)  BP: 153/74 (14 Aug 2021 04:20) (140/68 - 171/74)  BP(mean): --  RR: 18 (14 Aug 2021 04:20) (15 - 18)  SpO2: 97% (14 Aug 2021 04:20) (97% - 100%)    Constitutional: patient resting comfortably in bed, in no acute distress  HEENT: EOMI, no active drainage or redness  Neck: Full ROM without pain  Respiratory: respirations are unlabored, no accessory muscle use, no conversational dyspnea  Cardiovascular: regular rate & rhythm. Right chest wall with permacath  Gastrointestinal: Abdomen soft, non-tender, non-distended  Neurological: A&O x 3; no gross sensory / motor / coordination deficits  Musculoskeletal: LUE with AVG no thrill or pulse.       I&O's Detail    13 Aug 2021 07:01  -  14 Aug 2021 07:00  --------------------------------------------------------  IN:    Other (mL): 800 mL  Total IN: 800 mL    OUT:    Other (mL): 1300 mL  Total OUT: 1300 mL    Total NET: -500 mL          LABS:                        9.8    4.18  )-----------( 128      ( 13 Aug 2021 08:36 )             30.9     08-13    141  |  108<H>  |  34.1<H>  ----------------------------<  80  4.4   |  20.0<L>  |  2.21<H>    Ca    9.0      13 Aug 2021 08:36  Mg     2.4     08-13    TPro  7.1  /  Alb  4.2  /  TBili  0.9  /  DBili  x   /  AST  14  /  ALT  21  /  AlkPhos  120  08-13    PT/INR - ( 13 Aug 2021 08:36 )   PT: 15.3 sec;   INR: 1.34 ratio         PTT - ( 12 Aug 2021 12:05 )  PTT:40.9 sec    
Patient is a 61y old  Male who presents with a chief complaint of malfunctioning AVF (13 Aug 2021 08:00)    Pt is S/P R IJ permcath    Vital Signs Last 24 Hrs  T(C): 36.8 (13 Aug 2021 04:52), Max: 36.8 (12 Aug 2021 11:13)  T(F): 98.2 (13 Aug 2021 04:52), Max: 98.3 (12 Aug 2021 11:13)  HR: 41 (13 Aug 2021 08:45) (39 - 75)  BP: 167/77 (13 Aug 2021 08:30) (148/73 - 184/79)  BP(mean): --  RR: 16 (13 Aug 2021 08:45) (15 - 20)  SpO2: 97% (13 Aug 2021 08:45) (96% - 99%)  I&O's Detail    MEDICATIONS  (STANDING):  aspirin enteric coated 81 milliGRAM(s) Oral daily  atorvastatin 40 milliGRAM(s) Oral at bedtime  clindamycin IVPB 600 milliGRAM(s) IV Intermittent once  gabapentin 100 milliGRAM(s) Oral at bedtime  levETIRAcetam 750 milliGRAM(s) Oral two times a day  NIFEdipine XL 60 milliGRAM(s) Oral daily  pantoprazole    Tablet 40 milliGRAM(s) Oral before breakfast  sevelamer carbonate 800 milliGRAM(s) Oral three times a day with meals    MEDICATIONS  (PRN):  acetaminophen   Tablet .. 650 milliGRAM(s) Oral every 6 hours PRN Temp greater or equal to 38C (100.4F), Mild Pain (1 - 3)  hydrALAZINE Injectable 10 milliGRAM(s) IV Push every 6 hours PRN for sbp abvoe 160 mmhg    PAST MEDICAL & SURGICAL HISTORY:  CHF (congestive heart failure)    CVA (cerebral vascular accident)    Seizures  last seizure 10 years ago    HTN (hypertension)    Hyperlipemia    COVID-19  october 2020    Atrial fibrillation    ESRD on dialysis  Tu/Thurs/Sat    Former smoker    History of cocaine use  remote hx, currently sober    H/O aortic dissection  s/p repair (2010), surgery complicated by bowel ischemia s/p bowel resection and ostomy with reversal    H/O aortic valve insufficiency    Mitral regurgitation    Aorta disorder    H/O colectomy    Status post double vessel coronary artery bypass    S/P AVR (aortic valve replacement)    S/P MVR (mitral valve replacement)      Physical Exam:  General: NAD, resting comfortably in bed  Neck: RIJ permcath in place without any hematoma, oozing or ecchymosis  Pulmonary: Nonlabored breathing, no respiratory distress  Cardiovascular: NSR  Abdominal: soft, NT/ND  Extremities: WWP    LABS:                        9.8    4.18  )-----------( 128      ( 13 Aug 2021 08:36 )             30.9     08-12    139  |  107  |  33.7<H>  ----------------------------<  88  4.3   |  21.0<L>  |  2.08<H>    Ca    9.3      12 Aug 2021 12:05  Mg     2.3     08-12    TPro  7.6  /  Alb  4.5  /  TBili  0.8  /  DBili  x   /  AST  15  /  ALT  23  /  AlkPhos  136<H>  08-12    PT/INR - ( 12 Aug 2021 12:05 )   PT: 14.9 sec;   INR: 1.30 ratio         PTT - ( 12 Aug 2021 12:05 )  PTT:40.9 sec  CAPILLARY BLOOD GLUCOSE          Radiology and Additional Studies:    Assessment:62 y/o M w/ PMHx ESRD on HD on Tu/Thur/Sat, CAD s/p remote PCI, AT/Aflutter (on eliquis recent DCCV 6/2021 with conversion to NSR), s/p CABGx2 (RSVG-OM and RPDA) for aortic insufficiency and mitral regurgitation s/p bioAVR (25MM CE [id number]) and bioMVR (31MM Magna Ease),  HFrEF (EF 40%), ascending aortic dissection s/p surgery complicated by CVA with residual left-sided weakness, colon resection for bowel resection s/p ostomy with reversal presents from home with malfunctioning fistula. Patient states he went to his outpatient site and was unable to receive HD due to flow issues. Patient also complains of intermittent fatigue and not feeling himself and was recently taken off amiodarone due to bradycardia; procardia held this am. He is asymptomatic of his bradycardia and follows with Dr. Frankel (Interventional Cardio) and Dr. Virk (EP); he had an MCOT placed revealing no afib; sinus with PVCs, however, ablation was discussed and patient was amenable      S/AZ IJ permcath placement     Tolerated procedure well    Plan:  May use permcath for next HD  Vascular following
[facility] DIVISION OF KIDNEY DISEASES AND HYPERTENSION -- HEMODIALYSIS NOTE  --------------------------------------------------------------------------------  Chief Complaint: ESRD/Ongoing hemodialysis requirement    24 hour events/subjective:  HD today via tunneled CVC      PAST HISTORY  --------------------------------------------------------------------------------  No significant changes to PMH, PSH, FHx, SHx, unless otherwise noted    ALLERGIES & MEDICATIONS  --------------------------------------------------------------------------------  Allergies    penicillin (Other)    Intolerances      Standing Inpatient Medications  aspirin enteric coated 81 milliGRAM(s) Oral daily  atorvastatin 40 milliGRAM(s) Oral at bedtime  chlorhexidine 4% Liquid 1 Application(s) Topical <User Schedule>  clindamycin IVPB 600 milliGRAM(s) IV Intermittent once  gabapentin 100 milliGRAM(s) Oral at bedtime  levETIRAcetam 750 milliGRAM(s) Oral two times a day  NIFEdipine XL 60 milliGRAM(s) Oral daily  pantoprazole    Tablet 40 milliGRAM(s) Oral before breakfast  sevelamer carbonate 800 milliGRAM(s) Oral three times a day with meals    PRN Inpatient Medications  acetaminophen   Tablet .. 650 milliGRAM(s) Oral every 6 hours PRN  hydrALAZINE Injectable 10 milliGRAM(s) IV Push every 6 hours PRN  sodium chloride 0.9% lock flush 10 milliLiter(s) IV Push every 1 hour PRN      REVIEW OF SYSTEMS  --------------------------------------------------------------------------------  Gen: No weight changes, fatigue, fevers/chills, weakness  Skin: No rashes  Head/Eyes/Ears/Mouth: No headache; Normal hearing; Normal vision w/o blurriness; No sinus pain/discomfort, sore throat  Respiratory: No dyspnea, cough, wheezing, hemoptysis  CV: No chest pain, PND, orthopnea  GI: No abdominal pain, diarrhea, constipation, nausea, vomiting, melena, hematochezia  : No increased frequency, dysuria, hematuria, nocturia  MSK: No joint pain/swelling; no back pain; no edema  Neuro: No dizziness/lightheadedness, weakness, seizures, numbness, tingling  Heme: No easy bruising or bleeding  Endo: No heat/cold intolerance  Psych: No significant nervousness, anxiety, stress, depression    All other systems were reviewed and are negative, except as noted.    VITALS/PHYSICAL EXAM  --------------------------------------------------------------------------------  T(C): 36.6 (08-13-21 @ 10:44), Max: 36.8 (08-13-21 @ 04:52)  HR: 45 (08-13-21 @ 10:44) (39 - 75)  BP: 171/74 (08-13-21 @ 10:44) (148/73 - 171/74)  RR: 18 (08-13-21 @ 10:44) (15 - 18)  SpO2: 97% (08-13-21 @ 10:44) (96% - 99%)  Wt(kg): --  Height (cm): 175.3 (08-12-21 @ 11:39)  Weight (kg): 84.6 (08-12-21 @ 22:25)  BMI (kg/m2): 27.5 (08-12-21 @ 22:25)  BSA (m2): 2.01 (08-12-21 @ 22:25)      Physical Exam:  	Gen: NAD   	HEENT: PERRL, supple neck, clear oropharynx  	Pulm: CTA B/L  	CV: RRR, S1S2; no rub  	Back: No spinal or CVA tenderness; no sacral edema  	Abd: +BS, soft, nontender/nondistended  	: No suprapubic tenderness  	UE: Warm, FROM, no clubbing, intact strength; no edema; no asterixis  	LE: Warm, FROM, no clubbing, intact strength; no edema  	Neuro: No focal deficits, intact gait  	Psych: Normal affect and mood  	Skin: Warm, without rashes  	Vascular access: tunneled CVC    LABS/STUDIES  --------------------------------------------------------------------------------              9.8    4.18  >-----------<  128      [08-13-21 @ 08:36]              30.9     141  |  108  |  34.1  ----------------------------<  80      [08-13-21 @ 08:36]  4.4   |  20.0  |  2.21        Ca     9.0     [08-13-21 @ 08:36]      Mg     2.4     [08-13-21 @ 08:36]    TPro  7.1  /  Alb  4.2  /  TBili  0.9  /  DBili  x   /  AST  14  /  ALT  21  /  AlkPhos  120  [08-13-21 @ 08:36]    PT/INR: PT 15.3 , INR 1.34       [08-13-21 @ 08:36]  PTT: 40.9       [08-12-21 @ 12:05]      Iron 29, TIBC 227, %sat 13      [12-05-20 @ 08:05]  Ferritin 1099      [12-05-20 @ 08:05]  TSH 2.16      [08-12-21 @ 12:05]  Lipid: chol --, , HDL --, LDL --      [11-15-20 @ 02:40]    
[facility] DIVISION OF KIDNEY DISEASES AND HYPERTENSION -- HEMODIALYSIS NOTE  --------------------------------------------------------------------------------  Chief Complaint: ESRD/Ongoing hemodialysis requirement    24 hour events/subjective:  s/p HD yesterday        PAST HISTORY  --------------------------------------------------------------------------------  No significant changes to PMH, PSH, FHx, SHx, unless otherwise noted    ALLERGIES & MEDICATIONS  --------------------------------------------------------------------------------  Allergies  penicillin (Other)          Standing Inpatient Medications  aspirin enteric coated 81 milliGRAM(s) Oral daily  atorvastatin 40 milliGRAM(s) Oral at bedtime  chlorhexidine 4% Liquid 1 Application(s) Topical <User Schedule>  clindamycin IVPB 600 milliGRAM(s) IV Intermittent once  gabapentin 100 milliGRAM(s) Oral at bedtime  levETIRAcetam 750 milliGRAM(s) Oral two times a day  NIFEdipine XL 60 milliGRAM(s) Oral daily  pantoprazole    Tablet 40 milliGRAM(s) Oral before breakfast  sevelamer carbonate 800 milliGRAM(s) Oral three times a day with meals    PRN Inpatient Medications  acetaminophen   Tablet .. 650 milliGRAM(s) Oral every 6 hours PRN  hydrALAZINE Injectable 10 milliGRAM(s) IV Push every 6 hours PRN  sodium chloride 0.9% lock flush 10 milliLiter(s) IV Push every 1 hour PRN      REVIEW OF SYSTEMS  --------------------------------------------------------------------------------  Gen: No weight changes, fatigue, fevers/chills, weakness  Skin: No rashes  Head/Eyes/Ears/Mouth: No headache  Respiratory: No dyspnea, cough,  CV: No chest pain, orthopnea  GI: No abdominal pain, diarrhea, constipation, nausea, vomiting,  MSK: No joint pain  Neuro: No dizziness/lightheadedness, weakness  Heme: No bleeding  Psych: No significant nervousness, anxiety, stress, depression    All other systems were reviewed and are negative, except as noted.    VITALS/PHYSICAL EXAM  --------------------------------------------------------------------------------  T(C): 36.7 (08-14-21 @ 11:15), Max: 36.8 (08-13-21 @ 16:09)  HR: 71 (08-14-21 @ 11:15) (45 - 73)  BP: 153/83 (08-14-21 @ 11:15) (140/68 - 162/72)  RR: 18 (08-14-21 @ 11:15) (18 - 18)  SpO2: 95% (08-14-21 @ 11:15) (95% - 100%)  Wt(kg): --    Weight (kg): 84.6 (08-12-21 @ 22:25)      08-13-21 @ 07:01  -  08-14-21 @ 07:00  --------------------------------------------------------  IN: 800 mL / OUT: 1300 mL / NET: -500 mL      Physical Exam:  	Gen: NAD, well-appearing  	HEENT: PERRL, supple neck,  	Pulm: CTA B/L  	CV: RRR, S1S2; no rub  	Abd: +BS, soft, nontender  	UE: Warm, intact strength; no asterixis  	LE: Warm, +edema  	Neuro: No focal deficits  	Psych: Normal affect and mood  	Skin: Warm, without rashes  	Vascular access: RIJ TDC,  LUMEIG    LABS/STUDIES  --------------------------------------------------------------------------------              9.8    5.38  >-----------<  127      [08-14-21 @ 08:03]              29.4     140  |  106  |  25.2  ----------------------------<  87      [08-14-21 @ 08:03]  3.8   |  23.0  |  1.74        Ca     8.9     [08-14-21 @ 08:03]      Mg     2.1     [08-14-21 @ 08:03]    TPro  7.2  /  Alb  4.0  /  TBili  0.8  /  DBili  x   /  AST  11  /  ALT  18  /  AlkPhos  118  [08-14-21 @ 08:03]    PT/INR: PT 15.3 , INR 1.34       [08-13-21 @ 08:36]      Iron 29, TIBC 227, %sat 13      [12-05-20 @ 08:05]  Ferritin 1099      [12-05-20 @ 08:05]  TSH 2.16      [08-12-21 @ 12:05]  Lipid: chol --, , HDL --, LDL --      [11-15-20 @ 02:40]    HBsAg Nonreact      [08-14-21 @ 02:18]

## 2021-08-14 NOTE — DISCHARGE NOTE PROVIDER - HOSPITAL COURSE
62 y/o M w/ PMHx ESRD on HD on Tu/Thur/Sat CAD s/p remote PCI, AT/Aflutter on eliquis, s/p CABG for aortic insufficiency and mitral regurgitation s/p bioAVR and bioMVR,  HFrEF (EF 40%), ascending aortic dissection s/p surgery complicated by CVA with residual left-sided weakness, colon resection for bowel resection s/p ostomy with reversal presents from home with malfunctioning fistula.    ·  Problem: Dialysis AV fistula malfunction.  Plan:  permacth placed by vascular   Continue HD via permacath .     ·  Problem: R/O Chronic kidney disease, unspecified CKD stage.  Plan: renal following  - HD through new permacath as per renal   normal days are t/th/sa.      Problem/Plan - 3:  ·  Problem: R/O HTN (hypertension).  Plan: c.w  metoprolol ,  losartan      ·  Problem: R/O Chronic atrial fibrillation.  Plan: Resume  eliquis,     ·  Problem: R/O CHF (congestive heart failure).  Plan: stable  -chronic systolic hf    Problem: R/O Seizures. Plan: Continue  keppra.    ·  Problem: R/O Anemia due to chronic blood loss.  Plan: stable

## 2021-08-14 NOTE — DISCHARGE NOTE NURSING/CASE MANAGEMENT/SOCIAL WORK - NSDCVIVACCINE_GEN_ALL_CORE_FT
influenza, injectable, quadrivalent, preservative free; 12-Dec-2020 09:28; Lynn Gerardo); [employer]; 724k2 (Exp. Date: 30-Jun-2021); IntraMuscular; Deltoid Right.; 0.5 milliLiter(s); VIS (VIS Published: 15-Aug-2019, VIS Presented: 12-Dec-2020);

## 2021-08-14 NOTE — DISCHARGE NOTE PROVIDER - PROVIDER TOKENS
PROVIDER:[TOKEN:[[id number]],FOLLOWUP:[1 week],ESTABLISHEDPATIENT:[T]],PROVIDER:[TOKEN:[[id number]],FOLLOWUP:[1-3 days],ESTABLISHEDPATIENT:[T]]

## 2021-08-14 NOTE — DISCHARGE NOTE NURSING/CASE MANAGEMENT/SOCIAL WORK - NSDCPEPTCAREGIVEDUMATLIST _GEN_ALL_CORE
Chief Complaint   Patient presents with   • Thyrotoxicosis     right lobe toxic nodule        HPI:    1. Question thyrotoxicosis  I was dealing with the patient’s thyroid status last January and February.  My impression at that time was that she has a toxic thyroid nodule in her right lobe suppressing her TSH but T4 and T3 have been basically normal .Her symptoms are not convincingly present for a thyrotoxicosis.  Therefore I did not know whether I-131 ablation would be appropriate or not.  In the interval she has had an extraordinarily difficult problem with recurrent renal stones and obstruction and infection.  She has been in and out of the hospital.  She is worn down and tired and I am not sure this is the time either.  However she is definitely thyrotoxic.  We should at least give her methimazole.  In the past, I have checked her pituitary function which is intact and therefore validates the low TSH.      The other complication is that her osteoporosis which is treated with Prolia may still be advancing.  There is some back pain and mild suspicion that this may represent compression fractures.  By x-ray we know she does have compression fractures but we don’t know if they are acute or old.  Therefore she is getting an MRI soon to see if we can date the compression fractures.  If they are recent, that might move us to treat the thyrotoxicosis one way or the other right now.      Her previous I-123 nuclear medicine scan was a year ago September 2017.  Her uptake was definitely in the right lobe, hot nodule with a five hour uptake of 19.7% so this would be a treatable nodule.      The plan is to update her free T4 and free T3.  Her recent TSH was .01 so it is still suppressed.  I will repeat that also for confirmation.    Next follow up by telephone.  If the T4 and T3 are definitively abnormal, I might just suggest she go ahead with I-131 therapy.  She has had CT scan and procedures during her hospitalization but  "I don’t think any of them involved IV contrast.      ROS:   Very weak  Mid back pain. Question compression fractures ?      Allergies:   Allergies   Allergen Reactions   • Nkda [No Known Drug Allergy]        Current medicines including changes today:  Current Outpatient Prescriptions   Medication Sig Dispense Refill   • docusate sodium (COLACE) 100 MG Cap Take 1 Cap by mouth 2 times a day. 60 Cap 1   • HYDROcodone/acetaminophen (NORCO)  MG Tab Take 1-2 Tabs by mouth as needed.     • rivaroxaban (XARELTO) 20 MG Tab tablet Take 1 Tab by mouth with dinner. 30 Tab 0   • Tiotropium Bromide Monohydrate (SPIRIVA RESPIMAT) 2.5 MCG/ACT Aero Soln Inhale 1 Puff by mouth 2 Times a Day.     • BREO ELLIPTA 200-25 MCG/INH AEROSOL POWDER, BREATH ACTIVATED INHALE ONE DOSE BY MOUTH DAILY. RINSE MOUTH AFTER USE. 1 Each 11   • COMBIGAN 0.2-0.5 % Solution Place 1 Drop in both eyes 2 Times a Day.     • vitamin D, Ergocalciferol, (DRISDOL) 57026 UNITS Cap capsule Take 50,000 Units by mouth every 14 days.     • propranolol LA (INDERAL LA) 60 MG CAPSULE SR 24 HR Take 60 mg by mouth every morning.     • zolpidem (AMBIEN) 10 MG Tab Take 10 mg by mouth at bedtime as needed for Sleep.     • simvastatin (ZOCOR) 40 MG Tab Take 40 mg by mouth every evening.     • losartan (COZAAR) 50 MG TABS Take 50 mg by mouth every morning.     • PROAIR  (90 Base) MCG/ACT Aero Soln inhalation aerosol INHALE TWO PUFFS BY MOUTH EVERY 6 HOURS AS NEEDED FOR SHORTNESS OF BREATH 1 Inhaler 2     No current facility-administered medications for this visit.         Past Medical History:   Diagnosis Date   • Anesthesia     \"Abnormal blood pressure and breathing\"  \"couldn't breathe\"   • Asthma     inhalers daily   • Backpain 7/2017     thor. area   • Blood clot in vein 07/06/2018    \"Currently have a blood clot in my left eye\"   • Bowel habit changes 07/06/2018    Constipation   • Breath shortness     with exertion has O2 but does not use   • Bronchitis    • " "CAD (coronary artery disease)     palpatations   • Cancer (HCC) 2016    left lung   • Chickenpox    • COPD (chronic obstructive pulmonary disease) (HCC)    • Dialysis 2005    transient renal failure due to sepsis   • Emphysema of lung (HCC)    • Glaucoma     right eye   • Hemorrhagic disorder (HCC)    • Hyperlipidemia    • Hypertension    • Hyperthyroidism    • Kidney stone     bilateral   • Lung cancer (HCC) 12/12/2016   • Multiple thyroid nodules 7/9/2015   • Nasal drainage    • Osteoporosis    • Pain 07/06/2018    Back pain   • Personal history of venous thrombosis and embolism 2004, 2012    right leg 2012, left arm dvt 2004 left eye 2017   • Pneumonia 7/2016    per patient   • Renal disorder     had been on dialysis for 7 months for acute failure 2005   • Renal stones 2013    post lithotripsy   • Rheumatoid arthritis (HCC)     question   • Rheumatoid arthritis (HCC)    • S/P appendectomy 1998    • S/P cholecystectomy 1998   • S/P kyphoplasty 2006, 2007, 2013   • Sepsis(995.91) 2005   • Shortness of breath 07/06/2018    Chronic current problem. \"A couple of years now\".  O2 concentrator at night - pt states she doesn't use it.       PHYSICAL EXAM:    /60   Pulse 88   Resp 16   Ht 1.702 m (5' 7\")   Wt 77.6 kg (171 lb)   SpO2 92%   BMI 26.78 kg/m²       Gen.   appears weak but not acutely ill or toxic. Does not appear obviously thyrotoxic    Skin   appropriate for sex and age    HEENT  unremarkable    Neck  prominent right lobe of the thyroid    Heart  regular    Extremities  trace edema around the ankles    Neuro  gait and station normal    Psych  appropriate, calm       ASSESSMENT AND RECOMMENDATIONS    1. Thyrotoxicosis with toxic single thyroid nodule and without thyroid storm                   Update lab by phone  - FREE THYROXINE; Future  - T3 FREE; Future  - TSH; Future    2. Multiple thyroid nodules              History of right thyroid functional nodule      DISPOSITION:  Follow-up thyroid blood " levels by telephone and suggestions for treatment       Kishore Torrez M.D.      Copies to: Keira Mendiola, P.A.-C. [phone]                   Dr Beck Richards   Apixaban/Eliquis

## 2021-08-14 NOTE — DISCHARGE NOTE PROVIDER - NSDCCPCAREPLAN_GEN_ALL_CORE_FT
PRINCIPAL DISCHARGE DIAGNOSIS  Diagnosis: Dialysis AV fistula malfunction  Assessment and Plan of Treatment:       SECONDARY DISCHARGE DIAGNOSES  Diagnosis: Hypertension  Assessment and Plan of Treatment:     Diagnosis: ESRD on hemodialysis  Assessment and Plan of Treatment:     Diagnosis: Chronic atrial fibrillation  Assessment and Plan of Treatment:

## 2021-08-15 ENCOUNTER — EMERGENCY (EMERGENCY)
Facility: HOSPITAL | Age: 61
LOS: 1 days | Discharge: HOME | End: 2021-08-15
Attending: STUDENT IN AN ORGANIZED HEALTH CARE EDUCATION/TRAINING PROGRAM
Payer: MEDICARE

## 2021-08-15 VITALS
TEMPERATURE: 99 F | WEIGHT: 184.97 LBS | HEIGHT: 69 IN | SYSTOLIC BLOOD PRESSURE: 172 MMHG | OXYGEN SATURATION: 99 % | DIASTOLIC BLOOD PRESSURE: 69 MMHG | HEART RATE: 63 BPM | RESPIRATION RATE: 20 BRPM

## 2021-08-15 DIAGNOSIS — I77.9 DISORDER OF ARTERIES AND ARTERIOLES, UNSPECIFIED: Chronic | ICD-10-CM

## 2021-08-15 DIAGNOSIS — Z90.49 ACQUIRED ABSENCE OF OTHER SPECIFIED PARTS OF DIGESTIVE TRACT: Chronic | ICD-10-CM

## 2021-08-15 DIAGNOSIS — Z95.2 PRESENCE OF PROSTHETIC HEART VALVE: Chronic | ICD-10-CM

## 2021-08-15 DIAGNOSIS — Z95.1 PRESENCE OF AORTOCORONARY BYPASS GRAFT: Chronic | ICD-10-CM

## 2021-08-15 PROCEDURE — 93010 ELECTROCARDIOGRAM REPORT: CPT

## 2021-08-15 PROCEDURE — 99284 EMERGENCY DEPT VISIT MOD MDM: CPT

## 2021-08-15 NOTE — ED ADULT TRIAGE NOTE - HEART RATE (BEATS/MIN)
EXAM:  CT Angiography Chest With Intravenous Contrast



CLINICAL HISTORY:  CP, SOB.  r/o PE



TECHNIQUE:  Axial computed tomographic angiography images of the

chest with intravenous contrast.  Sagittal and coronal

reformatted images were created and reviewed.  This CT exam was

performed using one or more of the following dose reduction

techniques:  automated exposure control, adjustment of the mA

and/or kV according to patient size, and/or use of iterative

reconstruction technique.  MIP reconstructed images were created

and reviewed.



COMPARISON:  No relevant prior studies available.



FINDINGS:

  Pulmonary arteries:  No abnormality noted.  No pulmonary

embolism.

  Aorta:  No acute change noted.  No thoracic aortic aneurysm.

  Lungs:  No abnormality noted.  No mass.  No consolidation.

  Pleural space:  No abnormality noted.  No significant effusion.

 No pneumothorax.

  Heart:  No abnormality noted.  No cardiomegaly.  No significant

pericardial effusion.  No evidence of RV dysfunction.

  Bones/joints:  No acute fracture.  No dislocation.

  Soft tissues:  No abnormality noted.

  Lymph nodes:  No abnormality noted.  No enlarged lymph nodes.

  Liver:  Fatty liver.



IMPRESSION:     

  No pulmonary embolus noted.



Electronically signed by:  Soheila Rodas MD  10/28/2020 7:02 PM

CDT Workstation: [id number]

## 2021-08-15 NOTE — ED ADULT TRIAGE NOTE - CHIEF COMPLAINT QUOTE
patient complaining of chest pain started 1 hour ago, patient has right chest wall port for HD- blood surrounding port

## 2021-08-16 NOTE — ED PROVIDER NOTE - SKIN, MLM
blood clot around right chest dialysis port, no active bleeding ; Skin normal color for race, warm, dry and intact. No evidence of rash. large blood clot around right chest dialysis port and extending outside of dressing, no active bleeding ; Skin normal color for race, warm, dry and intact. No evidence of rash.

## 2021-08-16 NOTE — ED PROVIDER NOTE - OBJECTIVE STATEMENT
60 y/o male with PMHx of  Atrial fibrillation, CHF, COVID-19 October 2020, CVA, ESRD on dialysis Tu/Thurs/Sat, Former smoker, H/O aortic dissection s/p repair (2010), surgery complicated by bowel ischemia s/p bowel resection and ostomy with reversal, H/O aortic valve insufficiency, History of cocaine use remote hx, HLD, HTN, Mitral regurgitation, and Seizures last seizure 10 years ago presents to ED c/o chest pain. Patient reports he is having blood around his right chest HD port that started tonight. Patient's right chest port was placed 2 nights ago at [facility]. 62 y/o male with PMHx of  Atrial fibrillation, CHF, COVID-19 October 2020, CVA, ESRD on dialysis Tu/Thurs/Sat, Former smoker, H/O aortic dissection s/p repair (2010), surgery complicated by bowel ischemia s/p bowel resection and ostomy with reversal, H/O aortic valve insufficiency, History of cocaine use remote hx, HLD, HTN, Mitral regurgitation, and Seizures last seizure 10 years ago presents to ED c/o chest pain. Patient reports he is having significant amount of blood around his right chest HD port that started tonight. Patient's right chest port was placed 2 nights ago at [facility].

## 2021-08-16 NOTE — ED PROVIDER NOTE - NSFOLLOWUPINSTRUCTIONS_ED_ALL_ED_FT
How to Care for Your Midline Catheter    WHAT YOU NEED TO KNOW:    A midline catheter can be kept in place for up to 30 days. You will need to care for the catheter, and for the skin around the catheter site. Proper care is important to prevent damage to the catheter, and to prevent infections.    Midline Catheter         DISCHARGE INSTRUCTIONS:    Call your local emergency number (911 in the [place]) for any of the following:   •You feel lightheaded, short of breath, or have chest pain.      •You have trouble breathing.      •You cough up blood.      Seek care immediately if:   •Blood soaks through your bandage.      •Your arm or leg feels warm, tender, and painful. It may look swollen and red.      •You have trouble moving your arm.      •Your catheter falls out.      Call your doctor if:   •You have a fever or swelling, redness, pain, or pus where the catheter was inserted.      •You see a tear in the tubing of your catheter.      •You see fluid leaking from the insertion site.      •You have questions or concerns about your condition or care.      Prevent an infection: The area around your catheter may get infected, or you may get an infection in your bloodstream. A bloodstream infection is called a catheter-related bloodstream infection (CRBSI). A CRBSI is caused by bacteria getting into your bloodstream through your catheter. This can lead to severe illness. The following are ways you can help prevent an infection:   •Wash your hands often. Use soap or an alcohol-based hand rub to clean your hands. Clean your hands before and after you touch the catheter or the catheter site. Remind anyone who cares for your catheter to wash his or her hands.  Handwashing           •Limit contact with the catheter. Do not touch or handle your catheter unless you need to care for it. Do not pull, push on, or move the catheter when you clean your skin or change the dressing. Wear clean medical gloves when you touch your catheter or change dressings.      •Keep the area covered and dry. Keep a sterile dressing over the catheter site. Wrap the insertion site with plastic and seal it with medical tape before you bathe. Take showers instead of baths. Do not swim or soak in a hot tub.      How to change the dressing and clean the area: Change the dressing every 3 to 7 days, or as directed. Change the dressing any time it becomes wet, dirty, or moves out of place. Always change the bandage in a clean area that is free of dust. Check your skin every day for signs of infection, such as pain, redness, swelling, and oozing.   •Wash your hands. Use soap and water or an alcohol-based hand rub.      •Put on clean medical gloves and a mask.  each folded glove by its cuff and put it on one at a time. Touch only the cuff when you put a glove on. Do not touch the outside of the gloves with your bare hands. Do not let them touch anything. If someone is helping you, that person also needs to wear a mask and gloves.      •Carefully remove the clear bandage. Unsnap your line from the area holding it in place. Use rubbing alcohol or saline to remove the tape, if needed. Remove your gloves and throw them away. Wash your hands again, and put on new clean medical gloves.      •Open the bandage kit. The kit will contain a sterile disposable pad. Carefully unfold the corners of the pad and lay it out on a clean surface. Empty the contents of the bandage kit away from you onto the pad.      •Open the cleaning pads from the kit. Use a cleaning pad to scrub the area where the catheter is inserted into your skin. Scrub the area as directed. Start at the insertion site and clean outward from it in circles. Let the area dry. Do not blow on your skin to help it dry.      •Use a new cleaning pad to scrub the tubing that comes out of your skin. Use a downward motion to clean the tubing. Do not go up and down the tubing. You might get germs into the tubing when you go back up.      •Secure the line. Place the pad that fits around the insertion site as directed. Place the tape bandage under your catheter line to secure it to your skin. Snap the line in place. Apply a new dressing as directed. If the dressing is clear, make sure you can see the insertion site.      How to care for the caps and tubing: Change the caps every 3 to 7 days, or as directed.   •Wash your hands. Use soap and water or an alcohol-based hand gel.      •Twist the caps to remove them from the end of each port. With an alcohol pad, scrub the end of each port in a twisting motion for 30 seconds. Place a new cap on the end of each port.      •Place protective caps over the injection caps, if directed. The caps will protect your catheter from infection when it is not being used.      Follow up with your doctor as directed: Write down your questions so you remember to ask them during your visits.

## 2021-08-16 NOTE — ED PROVIDER NOTE - PATIENT PORTAL LINK FT
You can access the FollowMyHealth Patient Portal offered by [facility] by registering at the following website: [email or web address]. By joining [employer]’s FollowMyHealth portal, you will also be able to view your health information using other applications (apps) compatible with our system.

## 2021-08-16 NOTE — ED PROCEDURE NOTE - GENERAL PROCEDURE DETAILS
under sterile conditions, removed excess clots around the catheter, and used chlorhexidine to disinfect the region, and applied biofilm and Tegaderm to the area

## 2021-08-16 NOTE — ED PROCEDURE NOTE - EBL
Exam: Nuclear medicine HIDA scan



HISTORY: Evaluate for cholecystitis



COMPARISON: None



TECHNIQUE: Patient was administered 0.82 mcg of CCK 15 minutes prior to injection of pharmaceutical. 
Patient administered 5 mCi of technetium 99m mebrofenin.



FINDINGS: Appropriate uptake of the radiotracer by the pattern parenchyma. There is excretion of radi
otracer into the intrahepatic biliary system. This passage of radiotracer from the common bile duct

into small bowel loops. After 1 hour, radiotracer was not noted in the gallbladder. Therefore, the pa
tient was given 2 mg of morphine intravenously. Additional imaging was performed for one hour.

There is lack of radiotracer localization in the gallbladder on the post morphine images.



IMPRESSION: Scintigraphic evidence of acute cholecystitis.



Reported By: Shon Pinedo 

Electronically Signed:  11/12/2020 12:14 PM none

## 2021-08-17 ENCOUNTER — INPATIENT (INPATIENT)
Facility: HOSPITAL | Age: 61
LOS: 0 days | Discharge: HOME | DRG: 252 | End: 2021-08-18
Attending: SURGERY | Admitting: SURGERY
Payer: MEDICARE

## 2021-08-17 VITALS
RESPIRATION RATE: 14 BRPM | OXYGEN SATURATION: 88 % | SYSTOLIC BLOOD PRESSURE: 196 MMHG | WEIGHT: 184.97 LBS | HEART RATE: 71 BPM | TEMPERATURE: 98 F | DIASTOLIC BLOOD PRESSURE: 53 MMHG | HEIGHT: 69 IN

## 2021-08-17 DIAGNOSIS — Z95.2 PRESENCE OF PROSTHETIC HEART VALVE: Chronic | ICD-10-CM

## 2021-08-17 DIAGNOSIS — Z95.1 PRESENCE OF AORTOCORONARY BYPASS GRAFT: Chronic | ICD-10-CM

## 2021-08-17 DIAGNOSIS — Z90.49 ACQUIRED ABSENCE OF OTHER SPECIFIED PARTS OF DIGESTIVE TRACT: Chronic | ICD-10-CM

## 2021-08-17 DIAGNOSIS — I77.9 DISORDER OF ARTERIES AND ARTERIOLES, UNSPECIFIED: Chronic | ICD-10-CM

## 2021-08-17 DIAGNOSIS — T82.898A OTHER SPECIFIED COMPLICATION OF VASCULAR PROSTHETIC DEVICES, IMPLANTS AND GRAFTS, INITIAL ENCOUNTER: ICD-10-CM

## 2021-08-17 LAB
ALBUMIN SERPL ELPH-MCNC: 4.5 G/DL — SIGNIFICANT CHANGE UP (ref 3.3–5.2)
ALP SERPL-CCNC: 138 U/L — HIGH (ref 40–120)
ALT FLD-CCNC: 26 U/L — SIGNIFICANT CHANGE UP
ANION GAP SERPL CALC-SCNC: 10 MMOL/L — SIGNIFICANT CHANGE UP (ref 5–17)
APTT BLD: 26 SEC — LOW (ref 27.5–35.5)
AST SERPL-CCNC: 32 U/L — SIGNIFICANT CHANGE UP
BASOPHILS # BLD AUTO: 0.07 K/UL — SIGNIFICANT CHANGE UP (ref 0–0.2)
BASOPHILS NFR BLD AUTO: 0.9 % — SIGNIFICANT CHANGE UP (ref 0–2)
BILIRUB SERPL-MCNC: 0.7 MG/DL — SIGNIFICANT CHANGE UP (ref 0.4–2)
BUN SERPL-MCNC: 29.3 MG/DL — HIGH (ref 8–20)
CALCIUM SERPL-MCNC: 9.2 MG/DL — SIGNIFICANT CHANGE UP (ref 8.6–10.2)
CHLORIDE SERPL-SCNC: 103 MMOL/L — SIGNIFICANT CHANGE UP (ref 98–107)
CO2 SERPL-SCNC: 28 MMOL/L — SIGNIFICANT CHANGE UP (ref 22–29)
CREAT SERPL-MCNC: 2.08 MG/DL — HIGH (ref 0.5–1.3)
EOSINOPHIL # BLD AUTO: 0.35 K/UL — SIGNIFICANT CHANGE UP (ref 0–0.5)
EOSINOPHIL NFR BLD AUTO: 4.5 % — SIGNIFICANT CHANGE UP (ref 0–6)
GLUCOSE SERPL-MCNC: 76 MG/DL — SIGNIFICANT CHANGE UP (ref 70–99)
HCT VFR BLD CALC: 31.5 % — LOW (ref 39–50)
HGB BLD-MCNC: 10.2 G/DL — LOW (ref 13–17)
IMM GRANULOCYTES NFR BLD AUTO: 0.3 % — SIGNIFICANT CHANGE UP (ref 0–1.5)
INR BLD: 1.29 RATIO — HIGH (ref 0.88–1.16)
LYMPHOCYTES # BLD AUTO: 1.93 K/UL — SIGNIFICANT CHANGE UP (ref 1–3.3)
LYMPHOCYTES # BLD AUTO: 24.6 % — SIGNIFICANT CHANGE UP (ref 13–44)
MCHC RBC-ENTMCNC: 30.1 PG — SIGNIFICANT CHANGE UP (ref 27–34)
MCHC RBC-ENTMCNC: 32.4 GM/DL — SIGNIFICANT CHANGE UP (ref 32–36)
MCV RBC AUTO: 92.9 FL — SIGNIFICANT CHANGE UP (ref 80–100)
MONOCYTES # BLD AUTO: 0.78 K/UL — SIGNIFICANT CHANGE UP (ref 0–0.9)
MONOCYTES NFR BLD AUTO: 10 % — SIGNIFICANT CHANGE UP (ref 2–14)
NEUTROPHILS # BLD AUTO: 4.68 K/UL — SIGNIFICANT CHANGE UP (ref 1.8–7.4)
NEUTROPHILS NFR BLD AUTO: 59.7 % — SIGNIFICANT CHANGE UP (ref 43–77)
PLATELET # BLD AUTO: 186 K/UL — SIGNIFICANT CHANGE UP (ref 150–400)
POTASSIUM SERPL-MCNC: 4.3 MMOL/L — SIGNIFICANT CHANGE UP (ref 3.5–5.3)
POTASSIUM SERPL-SCNC: 4.3 MMOL/L — SIGNIFICANT CHANGE UP (ref 3.5–5.3)
PROT SERPL-MCNC: 7.9 G/DL — SIGNIFICANT CHANGE UP (ref 6.6–8.7)
PROTHROM AB SERPL-ACNC: 14.8 SEC — HIGH (ref 10.6–13.6)
RBC # BLD: 3.39 M/UL — LOW (ref 4.2–5.8)
RBC # FLD: 13.3 % — SIGNIFICANT CHANGE UP (ref 10.3–14.5)
SODIUM SERPL-SCNC: 141 MMOL/L — SIGNIFICANT CHANGE UP (ref 135–145)
WBC # BLD: 7.83 K/UL — SIGNIFICANT CHANGE UP (ref 3.8–10.5)
WBC # FLD AUTO: 7.83 K/UL — SIGNIFICANT CHANGE UP (ref 3.8–10.5)

## 2021-08-17 PROCEDURE — 93010 ELECTROCARDIOGRAM REPORT: CPT

## 2021-08-17 PROCEDURE — 99285 EMERGENCY DEPT VISIT HI MDM: CPT | Mod: GC

## 2021-08-17 NOTE — ED PROVIDER NOTE - PHYSICAL EXAMINATION
General: Well appearing female in no acute distress  HEENT: Normocephalic, atraumatic. Moist mucous membranes. Oropharynx clear. No lymphadenopathy.  Eyes: No scleral icterus. EOMI. SHANEL.  Neck:. Soft and supple. Full ROM without pain. No midline tenderness  Cardiac: Regular rate and regular rhythm. No murmurs, rubs, gallops. Peripheral pulses 2+ and symmetric. No LE edema.  Resp: Lungs CTAB. Speaking in full sentences. No wheezes, rales or rhonchi.  Abd: Soft, non-tender, non-distended. No guarding or rebound. No scars, masses, or lesions.  Back: Spine midline and non-tender. No CVA tenderness.    Skin: No rashes, abrasions, or lacerations. +AV fistula L forearm, no thrill or bruit present. permacath present anterior chest wall, covered w/ dressing.   Neuro: AO x 3. Moves all extremities symmetrically. Motor strength and sensation grossly intact.

## 2021-08-17 NOTE — ED PROVIDER NOTE - CLINICAL SUMMARY MEDICAL DECISION MAKING FREE TEXT BOX
62 y/o M hx of CKD on dialysis (T,th,Sat), CVA, afib, chf, sent in by vascular surgeon dr. anguiano for av fistula repair and permacath removal.   no thrill/bruit of av fisutla of L forearm  vascular surgery aware, will get pre-op labs and admit.   patient has no acute complaints, vss.

## 2021-08-17 NOTE — ED ADULT NURSE NOTE - OBJECTIVE STATEMENT
Ambulatory admission for surgery r/t obstructed L AV fistula. Patient had a R CW Catheter placed last week for dialysis, scheduled days T/Th/S. Surgeon told patient to return to the hospital after the weekend to get the surgery for a new fistula. 20g PIV inserted to R wrist, labs drawn and sent, nasal swab sent, to be moved right to holding room to wait for a bed. Safety maintained, needs attended, will continue to monitor.

## 2021-08-17 NOTE — PATIENT PROFILE ADULT - STATED REASON FOR ADMISSION
Pt.  states that his that his left arm AV fistula will be unclogged tomorrow, and remove the right chest wall port. Pt.  states that his left arm AV fistula is clogged and is supposed to be unclogged tomorrow by surgery and also remove the right chest wall port.

## 2021-08-17 NOTE — PATIENT PROFILE ADULT - DOMESTIC TRAVEL HIGH RISK QUESTION
[FreeTextEntry1] : Echo to assess for valvular heart disease\par Continue ASA, Zocor, Enalapril\par Risk factor modification
No

## 2021-08-17 NOTE — ED PROVIDER NOTE - OBJECTIVE STATEMENT
60 y/o M hx of CKD on dialysis (T,th,Sat), CVA, afib, chf, sent in by vascular surgeon dr. anguiano for av fistula repair and permacath removal. per patient, his av fistula of the L forearm stopped working aprox 6 days ago when he was attempting to get dialysis. Endorses getting dialysis earlier today through the permacath on his anterior chest. Denies any acute complaints. Denies fever/chills. Denies nausea/vomiting. Denies chest pain/sob.

## 2021-08-17 NOTE — ED ADULT NURSE NOTE - CHIEF COMPLAINT QUOTE
Patient A&Ox4 complaining of left AV fistula being blocked x4 days, was seen in this ED last week for it. Stated has RCW cath in place for dialysis. Stated MD told him to come back after the weekend.

## 2021-08-17 NOTE — ED PROVIDER NOTE - ATTENDING CONTRIBUTION TO CARE
Marine: I performed a face to face bedside interview with patient regarding history of present illness, review of symptoms and past medical history. I completed an independent physical exam.  I have discussed patient's plan of care with resident.   I agree with note as stated above including HISTORY OF PRESENT ILLNESS, HIV, PAST MEDICAL/SURGICAL/FAMILY/SOCIAL HISTORY, ALLERGIES AND HOME MEDICATIONS, REVIEW OF SYSTEMS, PHYSICAL EXAM, MEDICAL DECISION MAKING and any PROGRESS NOTES during the time I functioned as the attending physician for this patient unless otherwise noted. My brief assessment is as follows: 61M h/o ESRD onHD, afib, chf sent in by vascular surgery for admission. Needs AVF repaired and permacath removed. patient with no current complaints. Plan for labs, admission.

## 2021-08-18 ENCOUNTER — TRANSCRIPTION ENCOUNTER (OUTPATIENT)
Age: 61
End: 2021-08-18

## 2021-08-18 VITALS
HEART RATE: 66 BPM | SYSTOLIC BLOOD PRESSURE: 150 MMHG | RESPIRATION RATE: 16 BRPM | OXYGEN SATURATION: 98 % | DIASTOLIC BLOOD PRESSURE: 69 MMHG

## 2021-08-18 LAB
RAPID RVP RESULT: SIGNIFICANT CHANGE UP
SARS-COV-2 RNA SPEC QL NAA+PROBE: SIGNIFICANT CHANGE UP

## 2021-08-18 PROCEDURE — 99152 MOD SED SAME PHYS/QHP 5/>YRS: CPT

## 2021-08-18 PROCEDURE — 93005 ELECTROCARDIOGRAM TRACING: CPT

## 2021-08-18 PROCEDURE — 99153 MOD SED SAME PHYS/QHP EA: CPT

## 2021-08-18 PROCEDURE — 86901 BLOOD TYPING SEROLOGIC RH(D): CPT

## 2021-08-18 PROCEDURE — 86900 BLOOD TYPING SEROLOGIC ABO: CPT

## 2021-08-18 PROCEDURE — C1725: CPT

## 2021-08-18 PROCEDURE — C1894: CPT

## 2021-08-18 PROCEDURE — 0225U NFCT DS DNA&RNA 21 SARSCOV2: CPT

## 2021-08-18 PROCEDURE — 85025 COMPLETE CBC W/AUTO DIFF WBC: CPT

## 2021-08-18 PROCEDURE — C1887: CPT

## 2021-08-18 PROCEDURE — 36901 INTRO CATH DIALYSIS CIRCUIT: CPT

## 2021-08-18 PROCEDURE — 85730 THROMBOPLASTIN TIME PARTIAL: CPT

## 2021-08-18 PROCEDURE — 36905 THRMBC/NFS DIALYSIS CIRCUIT: CPT

## 2021-08-18 PROCEDURE — 36012 PLACE CATHETER IN VEIN: CPT

## 2021-08-18 PROCEDURE — 99283 EMERGENCY DEPT VISIT LOW MDM: CPT

## 2021-08-18 PROCEDURE — 36415 COLL VENOUS BLD VENIPUNCTURE: CPT

## 2021-08-18 PROCEDURE — 80053 COMPREHEN METABOLIC PANEL: CPT

## 2021-08-18 PROCEDURE — 86850 RBC ANTIBODY SCREEN: CPT

## 2021-08-18 PROCEDURE — 37248 TRLUML BALO ANGIOP 1ST VEIN: CPT

## 2021-08-18 PROCEDURE — 85610 PROTHROMBIN TIME: CPT

## 2021-08-18 PROCEDURE — 99285 EMERGENCY DEPT VISIT HI MDM: CPT

## 2021-08-18 PROCEDURE — C1769: CPT

## 2021-08-18 RX ORDER — LOSARTAN POTASSIUM 100 MG/1
25 TABLET, FILM COATED ORAL DAILY
Refills: 0 | Status: DISCONTINUED | OUTPATIENT
Start: 2021-08-18 | End: 2021-08-18

## 2021-08-18 RX ORDER — ALTEPLASE 100 MG
6 KIT INTRAVENOUS ONCE
Refills: 0 | Status: DISCONTINUED | OUTPATIENT
Start: 2021-08-18 | End: 2021-08-18

## 2021-08-18 RX ORDER — LEVETIRACETAM 250 MG/1
750 TABLET, FILM COATED ORAL
Refills: 0 | Status: DISCONTINUED | OUTPATIENT
Start: 2021-08-18 | End: 2021-08-18

## 2021-08-18 RX ORDER — ENOXAPARIN SODIUM 100 MG/ML
40 INJECTION SUBCUTANEOUS DAILY
Refills: 0 | Status: DISCONTINUED | OUTPATIENT
Start: 2021-08-18 | End: 2021-08-18

## 2021-08-18 RX ORDER — ATORVASTATIN CALCIUM 80 MG/1
40 TABLET, FILM COATED ORAL AT BEDTIME
Refills: 0 | Status: DISCONTINUED | OUTPATIENT
Start: 2021-08-18 | End: 2021-08-18

## 2021-08-18 RX ORDER — ASPIRIN/CALCIUM CARB/MAGNESIUM 324 MG
81 TABLET ORAL DAILY
Refills: 0 | Status: DISCONTINUED | OUTPATIENT
Start: 2021-08-18 | End: 2021-08-18

## 2021-08-18 RX ORDER — METOPROLOL TARTRATE 50 MG
50 TABLET ORAL DAILY
Refills: 0 | Status: DISCONTINUED | OUTPATIENT
Start: 2021-08-18 | End: 2021-08-18

## 2021-08-18 RX ORDER — GABAPENTIN 400 MG/1
100 CAPSULE ORAL AT BEDTIME
Refills: 0 | Status: DISCONTINUED | OUTPATIENT
Start: 2021-08-18 | End: 2021-08-18

## 2021-08-18 RX ORDER — SODIUM CHLORIDE 9 MG/ML
1000 INJECTION, SOLUTION INTRAVENOUS
Refills: 0 | Status: DISCONTINUED | OUTPATIENT
Start: 2021-08-18 | End: 2021-08-18

## 2021-08-18 RX ADMIN — LEVETIRACETAM 750 MILLIGRAM(S): 250 TABLET, FILM COATED ORAL at 05:03

## 2021-08-18 RX ADMIN — Medication 50 MILLIGRAM(S): at 05:02

## 2021-08-18 RX ADMIN — SODIUM CHLORIDE 30 MILLILITER(S): 9 INJECTION, SOLUTION INTRAVENOUS at 01:11

## 2021-08-18 RX ADMIN — Medication 81 MILLIGRAM(S): at 08:59

## 2021-08-18 RX ADMIN — LOSARTAN POTASSIUM 25 MILLIGRAM(S): 100 TABLET, FILM COATED ORAL at 05:03

## 2021-08-18 NOTE — CHART NOTE - NSCHARTNOTEFT_GEN_A_CORE
Post-op Check    Subjective:  Pt offers no acute complaints at this time. Pain well controlled on current regiment. Denies chest pain, SOB, palpitations.     STATUS POST:      POST OPERATIVE DAY #: 0    MEDICATIONS  (STANDING):  alteplase    CDT Bolus. 6 milliGRAM(s) IntraCatheter. once  aspirin enteric coated 81 milliGRAM(s) Oral daily  atorvastatin 40 milliGRAM(s) Oral at bedtime  enoxaparin Injectable 40 milliGRAM(s) SubCutaneous daily  gabapentin 100 milliGRAM(s) Oral at bedtime  lactated ringers. 1000 milliLiter(s) (30 mL/Hr) IV Continuous <Continuous>  levETIRAcetam 750 milliGRAM(s) Oral two times a day  losartan 25 milliGRAM(s) Oral daily  metoprolol succinate ER 50 milliGRAM(s) Oral daily  torsemide 20 milliGRAM(s) Oral <User Schedule>    MEDICATIONS  (PRN):      Vital Signs Last 24 Hrs  T(C): 36.5 (18 Aug 2021 13:00), Max: 36.9 (18 Aug 2021 08:43)  T(F): 97.7 (18 Aug 2021 13:00), Max: 98.4 (18 Aug 2021 08:43)  HR: 66 (18 Aug 2021 14:00) (66 - 78)  BP: 150/69 (18 Aug 2021 14:00) (150/69 - 196/53)  BP(mean): --  RR: 16 (18 Aug 2021 14:00) (14 - 17)  SpO2: 98% (18 Aug 2021 14:00) (88% - 100%)    Physical Exam:    Constitutional: NAD  HEENT: PERRL, EOMI  Neck: No JVD, FROM without pain, R tunneled catheter C/D/I  Extremities: LUE: pulsatile AVG, palpable 2+ RA , skin over AVG has 2 stitches intact no discharge no hematoma   Respiratory: no accessory muscle use, respirations non-labored  Neurological: A&O x 3; without gross deficit    A: S/P LUE AVG declot, angioplasty, intraop angiogram patent AVG with good inflow and outflow    P:  LUE AVG is ready to use for HD    monitor AVG dialysis tolerance   F/U in one week outpatient for stitch removal   PC removal if tolerated AVG dialysis

## 2021-08-18 NOTE — H&P ADULT - HISTORY OF PRESENT ILLNESS
62 y/o M w/ PMHx ESRD on HD on Tu/Thur/Sat CAD s/p remote PCI, AT/Aflutter on eliquis, s/p CABG for aortic insufficiency and mitral regurgitation s/p bioAVR and bioMVR,  HFrEF (EF 40%), ascending aortic dissection s/p surgery complicated by CVA with residual left-sided weakness, colon resection for bowel resection s/p ostomy with reversal presents from home with malfunctioning brachiobrachio AV graft. Patient was recently admitted for a similar reason, had a permacath placed for dialysis access and was discharged with plan for outpatient AVG declot. Prior to the last admission, Patient states he went to his outpatient site and couldnt start HD due to it being clogged. Patient also complains of intermittent fatigue and not feeling himself and was recently taken off amiodarone due to bradycardia. Patient had minor bleeding from the permacath placement site, which has since resolved. Patient denies any other complaints.

## 2021-08-18 NOTE — DISCHARGE NOTE PROVIDER - CARE PROVIDER_API CALL
Scotty Argueta)  Surgery  [address], [address] [place] [address]  Phone: [phone]  Fax: [phone]  Follow Up Time: 2 weeks

## 2021-08-18 NOTE — DISCHARGE NOTE NURSING/CASE MANAGEMENT/SOCIAL WORK - NSDCPEFALRISK_GEN_ALL_CORE
For information on Fall & injury Prevention, visit [email or web address]

## 2021-08-18 NOTE — H&P ADULT - NSHPPHYSICALEXAM_GEN_ALL_CORE
GENERAL: NAD, well-groomed,   HEAD:  Atraumatic, Normocephalic  EYES: EOMI, PERRLA, conjunctiva and sclera clear  ENMT: No tonsillar erythema, exudates, or enlargement; Moist mucous membranes, Good dentition,  NECK: Supple, No JVD, Normal thyroid  NERVOUS SYSTEM:  Alert & Oriented X3, Good concentration; Motor Strength 5/5 B/L upper and lower extremities;   CHEST/LUNG: Right chest tunneled HD catheter insertion site hemostatic. No signs of infection. Clear to percussion bilaterally; No rales, rhonchi, wheezing, or rubs  HEART: Regular rate and rhythm; No murmurs, rubs, or gallops  ABDOMEN: Soft, Nontender, Nondistended; Bowel sounds present  EXTREMITIES:  2+ Peripheral Pulses, LUE AVG with no palpable thrill or pulse  LYMPH: No lymphadenopathy noted  SKIN: No rashes or lesions

## 2021-08-18 NOTE — BRIEF OPERATIVE NOTE - NSICDXBRIEFPROCEDURE_GEN_ALL_CORE_FT
PROCEDURES:  AV graft 18-Aug-2021 13:25:40 AV graft angiogram , angioplasty of the venous anastmosis, angioplasty of AVG Susie Burnett

## 2021-08-18 NOTE — DISCHARGE NOTE PROVIDER - HOSPITAL COURSE
61 year old male with ERSD presented to the ED on 8/17 with a left upper extremity thrombosed AVG.  Patient reported outpatient HD team was having access issues with the AVG and decided to access his Right IJ permacath instead.  Left AVG thrombectomy performed 8/18 and post procedure graft is deemed patent.  Patient next HD day is 8/19.  Patient is cleared for discharge to home today. The Left AVG is cleared to be accessed.  If any issues are encountered, the right IJ permacath can serve as backup.   Patient may follow up in the office within 2 weeks post discharge.

## 2021-08-18 NOTE — CHART NOTE - NSCHARTNOTEFT_GEN_A_CORE
S/P AVG balloon angioplasty,   intraop angiogram patent inflow and ouflow of the AVG   AVG is ready to use for dialysis S/P LUE AVG declot and balloon angioplasty,   intraop angiogram patent inflow and ouflow of L AVG   AVG is ready to use for dialysis

## 2021-08-18 NOTE — DISCHARGE NOTE PROVIDER - NSDCCPTREATMENT_GEN_ALL_CORE_FT
PRINCIPAL PROCEDURE  Procedure: Angiogram, AV graft  Findings and Treatment: av graft angiogram, angioplasty of the venous anastamosis, angioplasty of the AVG

## 2021-08-18 NOTE — DISCHARGE NOTE NURSING/CASE MANAGEMENT/SOCIAL WORK - NSDCVIVACCINE_GEN_ALL_CORE_FT
influenza, injectable, quadrivalent, preservative free; 12-Dec-2020 09:28; Lynn Gerardo); [employer]; [id number] (Exp. Date: 30-Jun-2021); IntraMuscular; Deltoid Right.; 0.5 milliLiter(s); VIS (VIS Published: 15-Aug-2019, VIS Presented: 12-Dec-2020);

## 2021-08-18 NOTE — H&P ADULT - ASSESSMENT
60 y/o M w/ PMHx ESRD on HD on Tu/Thur/Sat CAD s/p remote PCI, AT/Aflutter on eliquis, s/p CABG for aortic insufficiency and mitral regurgitation s/p bioAVR and bioMVR,  HFrEF (EF 40%), ascending aortic dissection s/p surgery complicated by CVA with residual left-sided weakness, colon resection for bowel resection s/p ostomy with reversal presents from home with malfunctioning brachiobrachio AV graft.  -Admit to vascular surgery - Dr. Argueta, any bed  -NPO w/ gentle IVF  -Plan for fistula declot in AM  -Home meds  -DVT PPX

## 2021-08-18 NOTE — BRIEF OPERATIVE NOTE - OPERATION/FINDINGS
Access on the AV graft, angiogram of the AV graft, stenosis of the venous anastomosis of the AVG, balloon angioplasty of the venous anastomosis, intraoperative angiogram s/p balloon angioplasty patent inflow and outflow of AVG.     AVG is ready to use for dialysis Access on the AV graft, angiogram of the AV graft, stenosis of the venous anastomosis of the AVG, balloon angioplasty of the venous anastomosis, intraoperative angiogram s/p balloon angioplasty patent inflow and outflow of AVG. total of 6 mg tPA injected before balloon angioplasty.    AVG is ready to use for dialysis Access on the AV graft, angiogram of the AV graft, stenosis of the venous anastomosis of the AVG, AVG declotting using 6 mg tPA, balloon angioplasty of the venous anastomosis, intraoperative angiogram s/p balloon angioplasty patent inflow and outflow of AVG.   AVG is ready to use for dialysis

## 2021-08-18 NOTE — PROGRESS NOTE ADULT - SUBJECTIVE AND OBJECTIVE BOX
60 y/o M w/ PMHx ESRD on HD on Tu/Thur/Sat CAD s/p remote PCI, AT/Aflutter on eliquis, s/p CABG for aortic insufficiency and mitral regurgitation s/p bioAVR and bioMVR,  HFrEF (EF 40%), ascending aortic dissection s/p surgery complicated by CVA with residual left-sided weakness, colon resection for bowel resection s/p ostomy with reversal presents from home with malfunctioning brachiobrachio AV graft. Patient was recently admitted for a similar reason, had a permacath placed for dialysis access and was discharged with plan for outpatient AVG declot. Prior to the last admission, Patient states he went to his outpatient site and couldnt start HD due to it being clogged. Patient also complains of intermittent fatigue and not feeling himself and was recently taken off amiodarone due to bradycardia. Patient had minor bleeding from the permacath placement site, which has since resolved. Patient denies any other complaints.    Permacath placement at [facility], tolerated HD 8/13/2021. Feels well, no new complaints. Denies nausea, vomiting, fever, chills, chest pain, shortness of breath, or any new or concerning symptoms.     Patient presents now for LUE AVF gram and declotting.      Pt A&O x3  Lungs CTA  S1S2     Plan   LUE fistulargram and declotting

## 2021-08-18 NOTE — DISCHARGE NOTE PROVIDER - NSDCFUSCHEDAPPT_GEN_ALL_CORE_FT
JONATHAN GIBSON ; 08/30/2021 ; [facility] JONATHAN Cherry ; 08/30/2021 ; [facility] JONATHAN Cherry ; 08/30/2021 ; [facility] JONATHAN Cherry ; 08/30/2021 ; [facility]

## 2021-08-18 NOTE — DISCHARGE NOTE PROVIDER - NSDCFUADDAPPT_GEN_ALL_CORE_FT
** PLEASE SEE DR. BLANCHARD AT THE [facility] LOCATION**    [address] [place] [id number]  [phone]

## 2021-08-18 NOTE — DISCHARGE NOTE NURSING/CASE MANAGEMENT/SOCIAL WORK - PATIENT PORTAL LINK FT
You can access the FollowMyHealth Patient Portal offered by [facility] by registering at the following website: [email or web address]. By joining [facility]’s FollowMyHealth portal, you will also be able to view your health information using other applications (apps) compatible with our system.

## 2021-08-24 NOTE — CHART NOTE - NSCHARTNOTEFT_GEN_A_CORE
Patient is a 60 y/o M w/ PMHx ESRD on HD on Tu/Thur/Sat CAD s/p remote PCI, AT/Aflutter on eliquis, s/p CABG for aortic insufficiency and mitral regurgitation s/p bioAVR and bioMVR,  HFrEF (EF 40%), ascending aortic dissection s/p surgery complicated by CVA with residual left-sided weakness, colon resection s/p ostomy with reversal presents from home with malfunctioning fistula. Patient states he went to his outpatient site and couldnt start HD due to it being clogged.  Blood sample received on 08/12/21 demonstrates that the patient is blood group O Rh(D) positive. The antibody screen is weakly positive, however, additional antibody workup is negative. All clinically significant antibodies have been excluded. Crossmatch compatible red blood cells must be selected for transfusion.

## 2021-08-30 ENCOUNTER — APPOINTMENT (OUTPATIENT)
Dept: NEPHROLOGY | Facility: CLINIC | Age: 61
End: 2021-08-30
Payer: COMMERCIAL

## 2021-08-30 ENCOUNTER — APPOINTMENT (OUTPATIENT)
Dept: TRANSPLANT | Facility: CLINIC | Age: 61
End: 2021-08-30
Payer: COMMERCIAL

## 2021-08-30 VITALS
DIASTOLIC BLOOD PRESSURE: 52 MMHG | WEIGHT: 188.2 LBS | HEART RATE: 73 BPM | HEIGHT: 67 IN | SYSTOLIC BLOOD PRESSURE: 134 MMHG | BODY MASS INDEX: 29.54 KG/M2 | TEMPERATURE: 97.9 F | RESPIRATION RATE: 16 BRPM

## 2021-08-30 DIAGNOSIS — Z01.818 ENCOUNTER FOR OTHER PREPROCEDURAL EXAMINATION: ICD-10-CM

## 2021-08-30 PROCEDURE — 99072 ADDL SUPL MATRL&STAF TM PHE: CPT

## 2021-08-30 PROCEDURE — 99205 OFFICE O/P NEW HI 60 MIN: CPT

## 2021-08-30 PROCEDURE — 99204 OFFICE O/P NEW MOD 45 MIN: CPT

## 2021-08-30 NOTE — HISTORY OF PRESENT ILLNESS
[TextBox_42] : JONATHAN GIBSON is a 61 year old male who presents for kidney transplant evaluation. \par Nephrologist: Dr Reyes. \par ESRD and was started on dialysis since November 2020 post CABG . LIkely had CKD and developed post op ATN with no renal recovery\par Access : HD catheter , had a  AVG  which has clotted and is following with vascular surgery \par \par He has an extensive medical  history. \par 1.Aortic dissection s/p emergent repair in 2010 c/b CVA with residual left sided weakness and bowel ischemia s/p bowel resection and ostomy with reversal repair\par 2. CAD s/p PCI to  LAD in 1998  and recent CABG x 2 (DGV-OM, RPDA with Dr. Butler 11/2020), AI and MR s/p tAVR, tMVR (11/2020 Dr. Butler) \par 3. Post-op pAfib/flutter on eliquis , s/p cardioversion . still has breif episodes of  PAF with RVR and cardiology is  planning for ablation. \par 4. HFrEF (EF 45-50% 02/21)\par 5. HTN\par 6. Seizure disorder, on Keppra \par 7. GIB s/p colonoscopy and AVMs cauterization 3/2021  \par \par Surgical h/o : as above \par Functional status: can walk 5 to 6 blocks , goes to the gym 3 times/week.  Has good functional status. \par Social history: lives alone, not  , no kids. Used cocaine for over 20 years, quit in 2010 . Quit alcohol in 2010. ex smoker  quit in 2010\par Family history:  No family h/o kidney disease. Mother has heart disease and father had cancer ( he dose not know what type) \par \par \par \par \par \par

## 2021-08-30 NOTE — PLAN
[FreeTextEntry1] : 1. ESRD dialysis :  Mr. JONATHAN GIBSON  Is a medically complex candidate given his extensive cardiac history along with other comorbidities. Despite that , he has quite good functional status. If he has a living donor, we can proceed further with evaluation. \par 2.Cardiac risk: echo, stress test and cardiac evaluation \par 3. Cancer screening: colonoscopy reviewed. check  PSA \par 4. ID: Serology for acute and chronic viral infections. Screening for latent TB\par 5. Imaging: Renal/abdominal /chest /Iliac imaging\par 6.Hypertension- managed by nephrology \par \par Compliance with dialysis is an issue and needs to be addressed \par \par I have personally discussed the risks and benefits of transplantation and patient attended transplant education class where the following was disclosed:\par  \par Reviewed factors affecting survival and morbidity while on dialysis, the transplant wait list and reviewed mirna-operative and long-term risk factors affecting outcome in kidney transplantation. \par  \par One year SRTR outcomes for national and [facility] were discussed in regards to patient survival and graft survival after transplantation. \par  \par Details of transplant surgery, including complications were discussed.\par Immunosuppression and complications including infection including life threatening sepsis and opportunistic infections, malignancy and new onset diabetes were discussed. \par  \par Benefits of live donor transplantation as well as variability in wait times across regions and multiple listing were discussed. \par KDPI >85% and PHS high risk criteria donors were discussed. \par HCV kidney transplantation was discussed.\par  \par Will proceed with completing/ updating work up and listing for transplant/ live donor transplant once work up is reviewed and found to be acceptable by multidisciplinary listing committee.\par

## 2021-08-30 NOTE — ASSESSMENT
[Fair candidate] : a fair candidate. We should proceed with our protocol for evaluation for kidney transplantation. [FreeTextEntry1] : 62yo man with esrd for kidney transplant evaluation\par -despite significant past medical history, actually fairly good insight on medical issues, and appears fairly robust.\par -looks better in real life than on paper, a reasonable transplant candidate\par -needs cardiac clearance\par -CT chest abdo pelvis with IV contrast\par -obtain colonoscopy report\par

## 2021-08-30 NOTE — HISTORY OF PRESENT ILLNESS
[Hypertension] : Hypertension [Cardiac History] : cardiac history [Blood Transfusion] : prior blood transfusion [Not Working] : Not working [90: Able to carry normal activity; minor signs or symptoms of disease.] : 90: Able to carry normal activity; minor signs or symptoms of disease.  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), stopped working 2010  [TextBox_42] : Good functional status despite comorbidities [de-identified] : 62yo man with ESRD on IHD for kidney transplant evaluation - poor historian\par Kidney failure post CABGx3 10/20\par IHD via left AVF (recently blocked), now has tunneled catheter TTS\par \par urine output - normal amounts\par no issues with stream\par \par PMHx\par CAD - first PCI/stent 1998, CABGx2 11/2021, tMV / tAVR \par post-op atrial flutter requiring cardioversion (on eliquis), continues to have atrial flutter - under consideration for ablation\par aortic aneurysm with emergent repair 2010 - complicated by CVA, and bowel ischemia s/p bowel resection and ostomy and reversal repair.\par CHF/acute hypoxic resp failure 10/19 - admitted for Bipap\par CVA 2010 - has residual left sided weakness\par GIB 3/2021 - AVMs at site of rectal anastomosis APC coagulated (switched from coumadin to eliquis)\par Seizures- had 2 seizures 2010, on keppra\par Covid infection - requiring admission (details unclear - patient states he had SOB, but not intubated), now vaccinated with moderna x2\par HT - now controlled\par \par feels some reactive depression due to medical history, but has not seen psychiatrist/psychologist, denies self harm\par -previously set up to see psychiatry for possible underlying anxiety/depression\par -previous history of cocaine addiction (30 years, daily use until 2010) and nyquil PM to sleep\par \par denies TB, hepatitis, HIV\par \par recent blood transfusion 2021 from GIB\par \par colonoscopy - ~June 2021, AVM bleeding coagulated\par \par Meds:\par reviewed includes aspirin, eliquis\par \par Allergies:  penicillin - felt incoherent, no troubles breathing\par \par FHx - mother cardiac issues, cancer, father cancer, no history kidney problems\par \par SHx\par lives alone\par unmarried, no kids\par has 2 brothers who live close by (youngest brother is medical decision maker/proxy)\par ex-smoker (2010)\par stopped etoh (2010)\par \par Living donors: none identified

## 2021-08-30 NOTE — REASON FOR VISIT
SUBJECTIVE:   Paige Le is a 47 year old female who presents to clinic today for the following health issues:      New Patient/Transfer of Care from [facility]   Establishing care  Forms to clear for scuba diving (asthma related)    Acute Illness   Acute illness concerns: Sinus problem   Onset: ongoing since Jan 2018    Fever: YES, last night     Chills/Sweats: no     Headache (location?): YES    Sinus Pressure:YES    Conjunctivitis:  no    Ear Pain: no    Rhinorrhea: no    Congestion: YES- nasal congestion     Sore Throat: no     Cough: no    Wheeze: no    Decreased Appetite: no    Nausea: no    Vomiting: no    Diarrhea:  no    Dysuria/Freq.: YES    Fatigue/Achiness: Fatigue    Sick/Strep Exposure: no      Therapies Tried and outcome: OTC - not working       Paige has recently moved to the area and is looking to establish care and is in need of medication refills.  Pt will be traveling soon and plans to scuba dive. She would like clearance for this given hx of asthma. See below.     Past/recent records reviewed and discussed for --   -Pt followed with candace Myers, 1/31  -Migraines- using topomax for daily prevention and maxalt for emergent use. Last migraine was >1 year ago and she thinks they worsen when her pain is uncontrolled. Pain has improved with better overall pain mgmt since her work with pain clinic.   -Followed with [place] [facility] for fibromyalgia- PT and med management.  She has an appointment in 2 days.   -Pt is taking Prozac 2 tablets in the morning and mood is well controlled.   -Using trazodone for sleep rarely.  -pt has had no aneurisms but has had MRI angiograms completed in the past due to family hx. Reviewed with pt.     Acute illness: She thinks she may have a sinus infection currently. Her right nostril has been congested since the cold she got around the end of December. She has been doing nasal washes consistently and sx's worsened within the last few days.  She has frontal and maxillary sinus pressure. She had hot/cold flashes last night, but it was not significant.   Denies: teeth pain, dizziness,     Asthma: Pt says she has had asthma for years and it is well controlled.She has worsened sx's with cold weather. Previously followed with Dr. Reid (pulmonologist) for this- reviewed note with pt. Pt will use her duoneb with first onset of asthma sx's, which usually controls sx's. If sx's do not improve she will be seen in the clinic and is given course of prednisone. Was on ICS per pulm but has since d'cd this med on her own because was concerned it caused vaginal yeast infections    : pt mentions she often gets vaginal yeast infections. She has been given script for diflucan to use prn by her past provider    Lipids: pt says she has been watching her diet and thinks high cholesterol dx was added years ago, prior to adjusting her diet.   Recent Labs   Lab Test  05/04/16   1349  03/02/15   1143  11/01/13   0902   CHOL  181  179  152   HDL  69  64  68   LDL  98  98  62   TRIG  70  84  108   CHOLHDLRATIO   --   2.8  2.2         Problem list and histories reviewed & adjusted, as indicated.  Additional history: as documented    Patient Active Problem List   Diagnosis     Anxiety state     Insomnia     Allergic rhinitis     Dysmenorrhea     Cystic Fibrosis Screening negative     Mild major depression (H)     Fibromyalgia     Patellofemoral disorder     Family history of aneurysm     Migraine     Obesity     Hyperlipidemia LDL goal <160     Grieving     Insomnia     History of alcoholism (H)     Morbid obesity (H)     Past Surgical History:   Procedure Laterality Date     ARTHROSCOPY KNEE RT/LT  1997    Left      DILATION AND CURETTAGE N/A 6/14/2017    Procedure: DILATION AND CURETTAGE;;  Surgeon: Livia Barnett DO;  Location:  OR     EXAM UNDER ANESTHESIA, ULTRASOUND N/A 6/14/2017    Procedure: EXAM UNDER ANESTHESIA, ULTRASOUND;  Ultrasound guided cervical dilation,  IUD placement, Endometrial biopsy, repair of unavoidable cervical laceration;  Surgeon: Livia Barnett DO;  Location: [facility] OR     HC TOOTH EXTRACTION W/FORCEP      wisdom teeth      INSERT INTRAUTERINE DEVICE N/A 2017    Procedure: INSERT INTRAUTERINE DEVICE;;  Surgeon: Livia Barnett DO;  Location: [facility] OR       Social History   Substance Use Topics     Smoking status: Never Smoker     Smokeless tobacco: Never Used     Alcohol use No      Comment: recovering     Family History   Problem Relation Age of Onset     Neurologic Disorder Mother      ruptured cerebral aneurysm age 60      Hypertension Mother      Depression/Anxiety Mother      CEREBROVASCULAR DISEASE Mother      Obesity Mother      Macular Degeneration Mother      Musculoskeletal Disorder Father      OA      GASTROINTESTINAL DISEASE Father      colon polyps age 60     Other Cancer Father      Brain Cancer-neuroblastoma     Depression/Anxiety Father      Obesity Father      Gynecology Sister      Rachel. irregular menses.  pre-eclampsia     HEART DISEASE Paternal Grandfather      Multiple MI's (first MI age 60)     DIABETES Paternal Grandfather      IDDM dx age 60  Severe/brittle/complications     Coronary Artery Disease Paternal Grandfather      Depression/Anxiety Paternal Grandfather      Obesity Paternal Grandfather      Gynecology Paternal Grandmother       of uterine CA in her 40's     Ovarian Cancer Paternal Grandmother       at age 40 from cancer     Eye Disorder Maternal Grandmother      glacucoma     DIABETES Maternal Grandmother      IDDM-onset age 70     Obesity Maternal Grandmother      Macular Degeneration Maternal Grandmother      Gynecology Paternal Aunt      uterine CA diagnosed @ 40yrs         Current Outpatient Prescriptions   Medication Sig Dispense Refill     topiramate (TOPAMAX) 25 MG tablet TAKE ONE TABLET BY MOUTH TWICE A  tablet 0     FLUoxetine (PROZAC) 40 MG capsule TAKE TWO CAPSULES BY MOUTH EVERY   capsule 0     COMPOUNDED NON-CONTROLLED SUBSTANCE (CMPD RX) - PHARMACY TO MIX COMPOUNDED MEDICATION Low dose Naltrexone:  6mg capsules/tablets one PO qhs 30 capsule 6     gabapentin (NEURONTIN) 300 MG capsule Take 2 capsules (600 mg) by mouth 3 times daily (Patient taking differently: Take 300 mg by mouth 2 times daily ) 270 capsule 3     montelukast (SINGULAIR) 10 MG tablet Take 1 tablet (10 mg) by mouth At Bedtime 30 tablet 1     fluconazole (DIFLUCAN) 150 MG tablet Take 1 tablet (150 mg) by mouth every 3 days 4 tablet 0     CALCIUM CARBONATE PO Take by mouth daily       MAGNESIUM CITRATE PO Take by mouth daily       Turmeric 450 MG CAPS        rizatriptan (MAXALT-MLT) 10 MG ODT tab Take 1 tablet (10 mg) by mouth at onset of headache for migraine May repeat in 2 hours. Max 3 tablets/24 hours. 18 tablet 3     albuterol (2.5 MG/3ML) 0.083% neb solution Take 1 vial (2.5 mg) by nebulization every 6 hours as needed for shortness of breath / dyspnea or wheezing 30 vial 1     albuterol (PROAIR HFA/PROVENTIL HFA/VENTOLIN HFA) 108 (90 BASE) MCG/ACT Inhaler Inhale 2 puffs into the lungs every 6 hours as needed for shortness of breath / dyspnea or wheezing 1 Inhaler 6     beclomethasone (QVAR) 80 MCG/ACT Inhaler Inhale 2 puffs into the lungs 2 times daily 1 Inhaler 3     traZODone (DESYREL) 50 MG tablet Take 2 tablets by mouth. 1 -2 TABs  PO at bedtime prn insomnia 180 tablet 6     order for DME Equipment being ordered: Nebulizer 1 Device 0     B Complex Vitamins (VITAMIN B COMPLEX PO)        Ferrous Sulfate (IRON SUPPLEMENT PO)        Naproxen Sodium (ALEVE PO) Take 220 mg by mouth       cetirizine-psuedoePHEDrine (ZYRTEC-D) 5-120 MG per tablet Take 1 tablet by mouth 2 times daily 90 tablet 3     cholecalciferol (VITAMIN D) 1000 UNIT tablet Take 1 tablet by mouth daily.       ipratropium - albuterol 0.5 mg/2.5 mg/3 mL (DUONEB) 0.5-2.5 (3) MG/3ML neb solution Take 1 vial (3 mLs) by nebulization once for 1 dose 1 Box  "3     Allergies   Allergen Reactions     Augmentin      reacted to Augmentin--  stomach upset -- can take penicillin     Codeine Swelling     Milk Protein Extract Difficulty breathing     Products with milk cause congestion, sinus mucous, difficulty breathing       Reviewed and updated as needed this visit by clinical staff  Tobacco  Allergies  Meds  Med Hx  Surg Hx  Fam Hx  Soc Hx      Reviewed and updated as needed this visit by Provider Tobacco  Allergies  Meds  Med Hx  Surg Hx  Fam Hx  Soc Hx            ROS:  Constitutional, HEENT, cardiovascular, pulmonary, gi and gu systems are negative, except as otherwise noted.    This document serves as a record of the services and decisions personally performed and made by Emmy White MD. It was created on her behalf by Charis Doss, a trained medical scribe. The creation of this document is based the provider's statements to the medical scribe.  Charis Doss February 14, 2018 4:15 PM      OBJECTIVE:     /70 (BP Location: Right arm, Patient Position: Sitting, Cuff Size: Adult Large)  Pulse 73  Temp 98.1  F (36.7  C) (Oral)  Resp 16  Ht 1.651 m (5' 5\")  Wt 116.1 kg (256 lb)  LMP  (LMP Unknown)  SpO2 97%  BMI 42.6 kg/m2  Body mass index is 42.6 kg/(m^2).  GENERAL: healthy, alert and no distress  EYES: Eyes grossly normal to inspection, PERRL and conjunctivae and sclerae normal, EOMI  HENT: ear canals and TM's normal, nose and mouth- posterior oropharynx with mild erythema without ulcers or lesions  Face: right maxillary and frontal sinus tenderness, no left sinus tenderness  NECK: no adenopathy, no asymmetry, masses, or scars and thyroid normal to palpation  RESP: lungs clear to auscultation - no rales, rhonchi or wheezes  CV: regular rate and rhythm, normal S1 S2, no S3 or S4, no murmur, click or rub, no peripheral edema and peripheral pulses strong  SKIN: no suspicious lesions or rashes to visible skin  PSYCH: mentation appears " normal, affect normal/bright    Diagnostic Test Results:  No results found for this or any previous visit (from the past 24 hour(s)).      MR ANGIOGRAM OF THE HEAD WITHOUT CONTRAST   Nov 17, 2011 6:17:00 PM    HISTORY: Thunderclap headache, loss of consciousness, involuntary  bowel movement, personal history of migraines.  Family history of  aneurysm, mother and sister.     TECHNIQUE:  3D time-of-flight MR angiogram of the head without  contrast. MIP reconstruction of all MR angiographic data was  performed.     COMPARISON: MRA head 4/29/2011     FINDINGS:  The bilateral distal internal carotid, basilar, bilateral  anterior cerebral, bilateral middle cerebral and bilateral posterior  cerebral arteries are patent and unremarkable with no evidence for  cerebral artery stenosis or aneurysm. The anterior communicating  artery is patent.      IMPRESSION:  Normal MR angiogram of the head.  No change from the  comparison study          Pulmonary Clinic Note -- Follow Up Visit  12/7/2016      PCP: Christine Farnsworth     Reason for visit: follow up moderate persistent asthma     Pulmonary HPI:   Paige Le is a 46 year old never smoker w/ h/o moderately persistent asthma & allergic rhinitis, who presents for a follow up visit of her asthma. Last Pulmonary visit 08/03/16 for initial evaluation of asthma presenting as ALBA & chronic cough. Her asthma was likely not diagnosed in her childhood, & as an adult patient was at one point taken off all maintenance medications d/t perceived ineffectiveness. At that time patient felt that her symptoms were progressively worsening, & she required frequent prednisone bursts to help manage her symptoms (as only on albuterol for inhaler regimen). Based on her presentation patient was started on beclomethasone & received a 2-week prednisone taper.      Patient reports doing very well. She has been very diligent about taking her Qvar BID, & did not require any prednisone burst since her  last visit, & does not require any rescue inhalers at baseline any longer (used to need albuterol TID PRN at baseline before this). She is getting over a URI -- during the height of her symptoms she was using Duoneb every 4 hours, now is back to PRN. Her URI-related cough is improving, but she is still requiring some cough syrup before bed.      Patient is a never smoker. 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 She received her flu vaccine earlier this year.     Review of systems: a complete 12-point ROS conducted, & found to be negative w/ exceptions as noted in the HPI.     Past medical history:   Past Medical History          Past Medical History   Diagnosis Date     Other anxiety states       Other and unspecified alcohol dependence, unspecified drinking behavior         Sober since 7/00     Mild major depression (H)       Allergic rhinitis       Fibromyalgia         Dx by head/neck/pain & Rheumatology     Patellofemoral disorder         bilateral     Migraine       Obesity 11/1/2013     Hyperlipidemia LDL goal <160 11/1/2013     Dysmenorrhea 10/12/2008     Family history of aneurysm 4/28/2011     Cystic Fibrosis Screening negative 5/1/2009       [facility]     ANXIETY STATE NOS 3/8/2005     INSOMNIA NEC 3/8/2005     ALLERGIC RHINITIS NOS 3/8/2005     Fibromyalgia 4/15/2010       [facility]     Migraine 4/22/2012       [facility]      Uncomplicated asthma         Not considered Asthma but I have breathing problems             Past Surgical History           Past Surgical History   Procedure Laterality Date     Hc tooth extraction w/forcep   1998       wisdom teeth      Arthroscopy knee rt/lt   1997       Left             Social history:         Social History   Substance Use Topics     Smoking status: Never Smoker      Smokeless tobacco: Never Used     Alcohol  Use: No         Comment: recovering         Family history:   Family History    Family History   Problem Relation Age of Onset     Neurologic Disorder Mother         ruptured cerebral aneurysm age 60      Musculoskeletal Disorder Father         OA      Gynecology Sister         Rachel. irregular menses.  pre-eclampsia     HEART DISEASE Paternal Grandfather         Multiple MI's (first MI age 60)     Gynecology Paternal Grandmother          of uterine CA in her 40's     Eye Disorder Maternal Grandmother         glacucoma     Neurologic Disorder Sister         Rachel.Aguirre aneurysm/screen age 35--watching      GASTROINTESTINAL DISEASE Father         colon polyps age 60     Gynecology Paternal Aunt         uterine CA diagnosed @ 40yrs     DIABETES Maternal Grandmother         IDDM-onset age 70     DIABETES Paternal Grandfather         IDDM dx age 60  Severe/brittle/complications     Coronary Artery Disease Paternal Grandfather       Hypertension Mother       Ovarian Cancer Paternal Grandmother          at age 40 from cancer     Other Cancer Father         Brain Cancer-neuroblastoma     Depression/Anxiety Paternal Grandfather       Depression/Anxiety Mother       Depression/Anxiety Father       CEREBROVASCULAR DISEASE Mother       Obesity Mother       Obesity Father       Obesity Maternal Grandmother       Obesity Paternal Grandfather              Medications:      Current Outpatient Prescriptions   Medication     gabapentin (NEURONTIN) 300 MG capsule     FLUoxetine (PROZAC) 40 MG capsule     topiramate (TOPAMAX) 25 MG tablet     rizatriptan (MAXALT) 10 MG tablet     traZODone (DESYREL) 50 MG tablet     beclomethasone (QVAR) 80 MCG/ACT Inhaler     lidocaine (XYLOCAINE) 2 % solution (viscous)     order for DME     B Complex Vitamins (VITAMIN B COMPLEX PO)     Ferrous Sulfate (IRON SUPPLEMENT PO)     ipratropium - albuterol 0.5 mg/2.5 mg/3 mL (DUONEB) 0.5-2.5 (3) MG/3ML nebulization     Naproxen Sodium (ALEVE PO)      "albuterol (PROAIR HFA, PROVENTIL HFA, VENTOLIN HFA) 108 (90 BASE) MCG/ACT inhaler     albuterol (2.5 MG/3ML) 0.083% nebulizer solution     cetirizine-psuedoePHEDrine (ZYRTEC-D) 5-120 MG per tablet     cholecalciferol (VITAMIN D) 1000 UNIT tablet      No current facility-administered medications for this visit.         Allergies:        Allergies   Allergen Reactions     Augmentin         reacted to Augmentin--  stomach upset -- can take penicillin     Codeine Swelling     Milk Protein Extract Difficulty breathing       Products with milk cause congestion, sinus mucous, difficulty breathing         Physical examination:  /84 mmHg  Pulse 56  Resp 16  Ht 1.626 m (5' 4\")  Wt 110.95 kg (244 lb 9.6 oz)  BMI 41.96 kg/m2  SpO2 100% on RA     General: very pleasant, alert, & in no apparent distress but talking in a hoarse voice  Eyes: non-icteric sclera, PERRL, EOMI  Nose: nasal mucosa w/o edema or hyperemia; no nasal polyps  Ears: EAC clear b/l w/ pearly gray TMs  Mouth: moist oral mucosa w/o lesions; no tonsillar enlargement; no oropharyngeal exudate, or erythema  Neck: supple, no masses, no thyromegaly  Lymphatics: no significant axillary, cervical, or supraclavicular LNs  CV: RRR, nl S1/S2, no S3/S4 appreciated, no m/r/g; intact & symmetric 3+ pulses (radial, DP)  Lungs: equal b/l air entry, clear throughout, normally resonant on percussion. No wheezing, rhonchi, or crackles. No cough  Abd: soft, NT, ND, did not assess bowel sounds  Ext: WWP, no BLE edema. No clubbing  Skin: no rashes, cyanosis, or jaundice  Neuro: intact mentation w/ normal speech. Normal strength & muscle tone w/ normal gait & stance  Psych: euthymic, normal affect, good eye contact     Labs: reviewed in EMR.     Imaging: reviewed in EMR.  #. CT chest, 7/15/16:    \"No acute pulmonary disease or pleural effusion. Minimal linear density near the minor fissure suggest minimal  discoid atelectasis. 3 mm nodular density medial right midlung, is " "not significantly change since 2007 and consequently considered radiographically benign.\"     PFT, 12/7/2016: on personal review -- normal spirometry that is not significantly changed since last evaluation 08/03/16.     Impression & recommendations:  46 year old never smoker w/ PMHx most significant for moderate persistent asthma, presenting for a follow up.      #. Moderate persistent asthma currently on albuterol & beclomethasone, adequately controlled.  #. Chronic cough, asthma-related, improved.  #. Allergic rhinitis     -- continue Qvar BID, PRN albuterol & Duoneb; all prescription renewed  -- patient knows her symptoms well, & recognizes triggers for urgent evaluation if she has an asthma exacerbation     RTC in 1 year, or earlier PRN.     Patient was seen & discussed w/ Dr. Gary MD, who is in agreement. Please, refer to staff note/addnedum for any changes or additions to this plan.      Keyana Reid MD  Pulmonary and Critical Care Fellow       Attending statement:    The patient was seen and examined by me with the pulmonary fellow, Dr Reid.  The case was discussed at length.  Vitals, lab results and imaging from our visit were reviewed.  The note written by Dr Reid above reflects our joint assessment and plan.     Raheem Vgea MD    ASSESSMENT/PLAN:     1. Moderate persistent asthma without complication  2. Cough   given current cough/recent bronchitis and desire to scuba will have her start back on daily ICS, with rinsing mouth/throat following. pt is to start daily Q-sabra. Reviewed timing of taking, onset, benefits, monitoring and typicall and severe AE of the medication. Advised not to scuba dive if asthma sx's present at all. If asthma is well controlled (no cough, wheeze, dyspnea, chest tightness) and no further allergy/sinus sx's ok to scuba. Forms completed.   - beclomethasone (QVAR) 80 MCG/ACT Inhaler; Inhale 2 puffs into the lungs 2 times daily  Dispense: 1 Inhaler; Refill: 11    3. Acute " maxillary sinusitis, recurrence not specified  persistent sinus sx's for >1 month. Start doxycycline. Reviewed timing of taking, onset, benefits, monitoring and typicall and severe AE of the medication. Reviewed probiotic use during abx to prevent diarrhea.  Reviewed symptomatic management of symptoms. Patient education provided, including expected course of illness and symptoms that may occur which would require urgent evalution. All questions answered.  Patient understands and agrees with plan.  - doxycycline (VIBRAMYCIN) 100 MG capsule; Take 1 capsule (100 mg) by mouth 2 times daily  Dispense: 20 capsule; Refill: 0    4. Acute bronchitis, unspecified organism   as above #2. Given diflucan due to hx of yeast infections with abx use. Reviewed timing of taking, onset, benefits, monitoring and typicall and severe AE of the medication.   - fluconazole (DIFLUCAN) 150 MG tablet; Take 1 tablet (150 mg) by mouth every 3 days  Dispense: 4 tablet; Refill: 0    5. Morbid obesity (H)  6. Mild major depression (H)  7. History of alcoholism (H)  Stable with current meds.         Patient Instructions   Start Q-sabra. Rinse your mouth after using.       Length of visit was 32 minutes with more than 50 percent of that time used for discussing medical concerns and education     The information in this document, created by the medical scribe for me, accurately reflects the services I personally performed and the decisions made by me. I have reviewed and approved this document for accuracy.   MD Emmy Mena MD  [facility]   [Initial] : an initial visit for [End-Stage Renal Disease] : end-stage renal disease [Kidney Transplant Evaluation] : kidney transplant evaluation [FreeTextEntry2] : GENEVIEVE Reyes

## 2021-08-30 NOTE — PHYSICAL EXAM
[Soft] : soft [Non-tender] : non-tender [] : right dorsalis pedis palpable [Normal] : normal [TextBox_3] : looks fairly robust [TextBox_16] : sternotomy [TextBox_25] : midline abdominal incision/ostomy scars

## 2021-09-02 ENCOUNTER — APPOINTMENT (OUTPATIENT)
Dept: VASCULAR SURGERY | Facility: CLINIC | Age: 61
End: 2021-09-02

## 2021-09-03 ENCOUNTER — NON-APPOINTMENT (OUTPATIENT)
Age: 61
End: 2021-09-03

## 2021-09-07 LAB
ABO + RH PNL BLD: NORMAL
ABO + RH PNL BLD: NORMAL
ALBUMIN SERPL ELPH-MCNC: 4.4 G/DL
ALP BLD-CCNC: 116 U/L
ALT SERPL-CCNC: 18 U/L
ANION GAP SERPL CALC-SCNC: 15 MMOL/L
AST SERPL-CCNC: 39 U/L
BASOPHILS # BLD AUTO: 0.05 K/UL
BASOPHILS NFR BLD AUTO: 0.7 %
BILIRUB SERPL-MCNC: 0.7 MG/DL
BUN SERPL-MCNC: 42 MG/DL
C PEPTIDE SERPL-MCNC: 9.5 NG/ML
CALCIUM SERPL-MCNC: 9.1 MG/DL
CHLORIDE SERPL-SCNC: 103 MMOL/L
CHOLEST SERPL-MCNC: 117 MG/DL
CMV IGG SERPL QL: 7.7 U/ML
CMV IGG SERPL-IMP: POSITIVE
CO2 SERPL-SCNC: 21 MMOL/L
COVID-19 NUCLEOCAPSID  GAM ANTIBODY INTERPRETATION: POSITIVE
COVID-19 SPIKE DOMAIN ANTIBODY INTERPRETATION: POSITIVE
CREAT SERPL-MCNC: 2.65 MG/DL
EBV DNA SERPL NAA+PROBE-ACNC: NOT DETECTED IU/ML
EBV EA AB SER IA-ACNC: 23.5 U/ML
EBV EA AB TITR SER IF: POSITIVE
EBV EA IGG SER QL IA: 556 U/ML
EBV EA IGG SER-ACNC: POSITIVE
EBV EA IGM SER IA-ACNC: NEGATIVE
EBV PATRN SPEC IB-IMP: NORMAL
EBV VCA IGG SER IA-ACNC: 573 U/ML
EBV VCA IGM SER QL IA: <10 U/ML
EOSINOPHIL # BLD AUTO: 0.49 K/UL
EOSINOPHIL NFR BLD AUTO: 6.7 %
EPSTEIN-BARR VIRUS CAPSID ANTIGEN IGG: POSITIVE
ESTIMATED AVERAGE GLUCOSE: 100 MG/DL
GLUCOSE SERPL-MCNC: 90 MG/DL
HAV IGM SER QL: NONREACTIVE
HBA1C MFR BLD HPLC: 5.1 %
HBV CORE IGG+IGM SER QL: NONREACTIVE
HBV SURFACE AB SER QL: NONREACTIVE
HBV SURFACE AB SERPL IA-ACNC: <3 MIU/ML
HBV SURFACE AG SER QL: REACTIVE
HCT VFR BLD CALC: 31.3 %
HCV AB SER QL: NONREACTIVE
HCV S/CO RATIO: 0.13 S/CO
HDLC SERPL-MCNC: 62 MG/DL
HEPATITIS A IGG ANTIBODY: NONREACTIVE
HGB BLD-MCNC: 9.5 G/DL
HIV1+2 AB SPEC QL IA.RAPID: NONREACTIVE
HSV 1+2 IGG SER IA-IMP: NEGATIVE
HSV 1+2 IGG SER IA-IMP: POSITIVE
HSV1 IGG SER QL: 46.2 INDEX
HSV2 IGG SER QL: 0.13 INDEX
IMM GRANULOCYTES NFR BLD AUTO: 0.1 %
LDLC SERPL CALC-MCNC: 45 MG/DL
LYMPHOCYTES # BLD AUTO: 2.02 K/UL
LYMPHOCYTES NFR BLD AUTO: 27.8 %
M TB IFN-G BLD-IMP: NEGATIVE
MAGNESIUM SERPL-MCNC: 2.2 MG/DL
MAN DIFF?: NORMAL
MCHC RBC-ENTMCNC: 29.6 PG
MCHC RBC-ENTMCNC: 30.4 GM/DL
MCV RBC AUTO: 97.5 FL
MONOCYTES # BLD AUTO: 0.81 K/UL
MONOCYTES NFR BLD AUTO: 11.2 %
NEUTROPHILS # BLD AUTO: 3.88 K/UL
NEUTROPHILS NFR BLD AUTO: 53.5 %
NONHDLC SERPL-MCNC: 55 MG/DL
PHOSPHATE SERPL-MCNC: 4.1 MG/DL
PLATELET # BLD AUTO: 189 K/UL
POTASSIUM SERPL-SCNC: 5.9 MMOL/L
PROT SERPL-MCNC: 7.6 G/DL
PSA SERPL-MCNC: 0.88 NG/ML
QUANTIFERON TB PLUS MITOGEN MINUS NIL: 8.68 IU/ML
QUANTIFERON TB PLUS NIL: 0.02 IU/ML
QUANTIFERON TB PLUS TB1 MINUS NIL: 0 IU/ML
QUANTIFERON TB PLUS TB2 MINUS NIL: 0 IU/ML
RBC # BLD: 3.21 M/UL
RBC # FLD: 13.9 %
RUBV IGG FLD-ACNC: 7.7 INDEX
RUBV IGG SER-IMP: POSITIVE
SARS-COV-2 AB SERPL IA-ACNC: >250 U/ML
SARS-COV-2 AB SERPL QL IA: 24.7 INDEX
SODIUM SERPL-SCNC: 139 MMOL/L
T GONDII AB SER-IMP: NEGATIVE
T GONDII IGG SER QL: <3 IU/ML
T PALLIDUM AB SER QL IA: NEGATIVE
TRIGL SERPL-MCNC: 51 MG/DL
URATE SERPL-MCNC: 6.4 MG/DL
VZV AB TITR SER: POSITIVE
VZV IGG SER IF-ACNC: 1849 INDEX
WBC # FLD AUTO: 7.26 K/UL

## 2021-09-09 ENCOUNTER — NON-APPOINTMENT (OUTPATIENT)
Age: 61
End: 2021-09-09

## 2021-09-11 ENCOUNTER — APPOINTMENT (OUTPATIENT)
Dept: DISASTER EMERGENCY | Facility: CLINIC | Age: 61
End: 2021-09-11

## 2021-09-11 NOTE — DISCHARGE NOTE PROVIDER - NSDCCPCAREPLAN_GEN_ALL_CORE_FT
no PRINCIPAL DISCHARGE DIAGNOSIS  Diagnosis: AV fistula occlusion  Assessment and Plan of Treatment:

## 2021-09-12 LAB — SARS-COV-2 N GENE NPH QL NAA+PROBE: NOT DETECTED

## 2021-09-14 ENCOUNTER — TRANSCRIPTION ENCOUNTER (OUTPATIENT)
Age: 61
End: 2021-09-14

## 2021-09-14 ENCOUNTER — INPATIENT (INPATIENT)
Facility: HOSPITAL | Age: 61
LOS: 0 days | Discharge: HOME | DRG: 273 | End: 2021-09-15
Attending: INTERNAL MEDICINE | Admitting: INTERNAL MEDICINE
Payer: MEDICARE

## 2021-09-14 VITALS
SYSTOLIC BLOOD PRESSURE: 117 MMHG | HEART RATE: 84 BPM | HEIGHT: 68 IN | DIASTOLIC BLOOD PRESSURE: 71 MMHG | OXYGEN SATURATION: 98 % | RESPIRATION RATE: 16 BRPM | TEMPERATURE: 98 F | WEIGHT: 184.97 LBS

## 2021-09-14 DIAGNOSIS — Z95.2 PRESENCE OF PROSTHETIC HEART VALVE: Chronic | ICD-10-CM

## 2021-09-14 DIAGNOSIS — I77.0 ARTERIOVENOUS FISTULA, ACQUIRED: Chronic | ICD-10-CM

## 2021-09-14 DIAGNOSIS — I48.92 UNSPECIFIED ATRIAL FLUTTER: ICD-10-CM

## 2021-09-14 DIAGNOSIS — Z95.1 PRESENCE OF AORTOCORONARY BYPASS GRAFT: Chronic | ICD-10-CM

## 2021-09-14 DIAGNOSIS — Z86.79 PERSONAL HISTORY OF OTHER DISEASES OF THE CIRCULATORY SYSTEM: Chronic | ICD-10-CM

## 2021-09-14 DIAGNOSIS — Z90.49 ACQUIRED ABSENCE OF OTHER SPECIFIED PARTS OF DIGESTIVE TRACT: Chronic | ICD-10-CM

## 2021-09-14 LAB
ANION GAP SERPL CALC-SCNC: 15 MMOL/L — SIGNIFICANT CHANGE UP (ref 5–17)
BLD GP AB SCN SERPL QL: SIGNIFICANT CHANGE UP
BUN SERPL-MCNC: 13.7 MG/DL — SIGNIFICANT CHANGE UP (ref 8–20)
CALCIUM SERPL-MCNC: 9.3 MG/DL — SIGNIFICANT CHANGE UP (ref 8.6–10.2)
CHLORIDE SERPL-SCNC: 101 MMOL/L — SIGNIFICANT CHANGE UP (ref 98–107)
CO2 SERPL-SCNC: 25 MMOL/L — SIGNIFICANT CHANGE UP (ref 22–29)
CREAT SERPL-MCNC: 1.3 MG/DL — SIGNIFICANT CHANGE UP (ref 0.5–1.3)
GLUCOSE SERPL-MCNC: 88 MG/DL — SIGNIFICANT CHANGE UP (ref 70–99)
HCT VFR BLD CALC: 30.5 % — LOW (ref 39–50)
HGB BLD-MCNC: 9.8 G/DL — LOW (ref 13–17)
INR BLD: 1.32 RATIO — HIGH (ref 0.88–1.16)
MAGNESIUM SERPL-MCNC: 1.9 MG/DL — SIGNIFICANT CHANGE UP (ref 1.6–2.6)
MCHC RBC-ENTMCNC: 30 PG — SIGNIFICANT CHANGE UP (ref 27–34)
MCHC RBC-ENTMCNC: 32.1 GM/DL — SIGNIFICANT CHANGE UP (ref 32–36)
MCV RBC AUTO: 93.3 FL — SIGNIFICANT CHANGE UP (ref 80–100)
PLATELET # BLD AUTO: 178 K/UL — SIGNIFICANT CHANGE UP (ref 150–400)
POTASSIUM SERPL-MCNC: 3.8 MMOL/L — SIGNIFICANT CHANGE UP (ref 3.5–5.3)
POTASSIUM SERPL-SCNC: 3.8 MMOL/L — SIGNIFICANT CHANGE UP (ref 3.5–5.3)
PROTHROM AB SERPL-ACNC: 15.1 SEC — HIGH (ref 10.6–13.6)
RBC # BLD: 3.27 M/UL — LOW (ref 4.2–5.8)
RBC # FLD: 14 % — SIGNIFICANT CHANGE UP (ref 10.3–14.5)
SODIUM SERPL-SCNC: 141 MMOL/L — SIGNIFICANT CHANGE UP (ref 135–145)
WBC # BLD: 6.56 K/UL — SIGNIFICANT CHANGE UP (ref 3.8–10.5)
WBC # FLD AUTO: 6.56 K/UL — SIGNIFICANT CHANGE UP (ref 3.8–10.5)

## 2021-09-14 PROCEDURE — 93657 TX L/R ATRIAL FIB ADDL: CPT

## 2021-09-14 PROCEDURE — 93613 INTRACARDIAC EPHYS 3D MAPG: CPT

## 2021-09-14 PROCEDURE — 93662 INTRACARDIAC ECG (ICE): CPT | Mod: 26

## 2021-09-14 PROCEDURE — 93010 ELECTROCARDIOGRAM REPORT: CPT | Mod: 76

## 2021-09-14 PROCEDURE — 93655 ICAR CATH ABLTJ DSCRT ARRHYT: CPT

## 2021-09-14 PROCEDURE — 93656 COMPRE EP EVAL ABLTJ ATR FIB: CPT

## 2021-09-14 RX ORDER — ACETAMINOPHEN 500 MG
650 TABLET ORAL EVERY 6 HOURS
Refills: 0 | Status: DISCONTINUED | OUTPATIENT
Start: 2021-09-14 | End: 2021-09-15

## 2021-09-14 RX ORDER — LOSARTAN POTASSIUM 100 MG/1
25 TABLET, FILM COATED ORAL DAILY
Refills: 0 | Status: DISCONTINUED | OUTPATIENT
Start: 2021-09-14 | End: 2021-09-15

## 2021-09-14 RX ORDER — SEVELAMER CARBONATE 2400 MG/1
800 POWDER, FOR SUSPENSION ORAL EVERY 12 HOURS
Refills: 0 | Status: DISCONTINUED | OUTPATIENT
Start: 2021-09-14 | End: 2021-09-15

## 2021-09-14 RX ORDER — ONDANSETRON 8 MG/1
4 TABLET, FILM COATED ORAL EVERY 8 HOURS
Refills: 0 | Status: DISCONTINUED | OUTPATIENT
Start: 2021-09-14 | End: 2021-09-15

## 2021-09-14 RX ORDER — FUROSEMIDE 40 MG
20 TABLET ORAL ONCE
Refills: 0 | Status: COMPLETED | OUTPATIENT
Start: 2021-09-14 | End: 2021-09-14

## 2021-09-14 RX ORDER — ASCORBIC ACID 60 MG
500 TABLET,CHEWABLE ORAL DAILY
Refills: 0 | Status: DISCONTINUED | OUTPATIENT
Start: 2021-09-14 | End: 2021-09-15

## 2021-09-14 RX ORDER — SUCRALFATE 1 G
1 TABLET ORAL
Refills: 0 | Status: DISCONTINUED | OUTPATIENT
Start: 2021-09-14 | End: 2021-09-15

## 2021-09-14 RX ORDER — ASPIRIN/CALCIUM CARB/MAGNESIUM 324 MG
81 TABLET ORAL DAILY
Refills: 0 | Status: DISCONTINUED | OUTPATIENT
Start: 2021-09-14 | End: 2021-09-15

## 2021-09-14 RX ORDER — LEVETIRACETAM 250 MG/1
750 TABLET, FILM COATED ORAL
Refills: 0 | Status: DISCONTINUED | OUTPATIENT
Start: 2021-09-14 | End: 2021-09-15

## 2021-09-14 RX ORDER — METOPROLOL TARTRATE 50 MG
25 TABLET ORAL DAILY
Refills: 0 | Status: DISCONTINUED | OUTPATIENT
Start: 2021-09-14 | End: 2021-09-15

## 2021-09-14 RX ORDER — MAGNESIUM SULFATE 500 MG/ML
2 VIAL (ML) INJECTION ONCE
Refills: 0 | Status: COMPLETED | OUTPATIENT
Start: 2021-09-14 | End: 2021-09-14

## 2021-09-14 RX ORDER — ATORVASTATIN CALCIUM 80 MG/1
40 TABLET, FILM COATED ORAL AT BEDTIME
Refills: 0 | Status: DISCONTINUED | OUTPATIENT
Start: 2021-09-14 | End: 2021-09-15

## 2021-09-14 RX ORDER — BENZOCAINE AND MENTHOL 5; 1 G/100ML; G/100ML
1 LIQUID ORAL
Refills: 0 | Status: DISCONTINUED | OUTPATIENT
Start: 2021-09-14 | End: 2021-09-15

## 2021-09-14 RX ORDER — FUROSEMIDE 40 MG
20 TABLET ORAL DAILY
Refills: 0 | Status: DISCONTINUED | OUTPATIENT
Start: 2021-09-15 | End: 2021-09-15

## 2021-09-14 RX ORDER — PANTOPRAZOLE SODIUM 20 MG/1
40 TABLET, DELAYED RELEASE ORAL EVERY 12 HOURS
Refills: 0 | Status: DISCONTINUED | OUTPATIENT
Start: 2021-09-14 | End: 2021-09-15

## 2021-09-14 RX ORDER — ALPRAZOLAM 0.25 MG
0.25 TABLET ORAL EVERY 6 HOURS
Refills: 0 | Status: DISCONTINUED | OUTPATIENT
Start: 2021-09-14 | End: 2021-09-15

## 2021-09-14 RX ORDER — APIXABAN 2.5 MG/1
5 TABLET, FILM COATED ORAL
Refills: 0 | Status: DISCONTINUED | OUTPATIENT
Start: 2021-09-14 | End: 2021-09-15

## 2021-09-14 RX ORDER — GABAPENTIN 400 MG/1
100 CAPSULE ORAL AT BEDTIME
Refills: 0 | Status: DISCONTINUED | OUTPATIENT
Start: 2021-09-14 | End: 2021-09-15

## 2021-09-14 RX ADMIN — ATORVASTATIN CALCIUM 40 MILLIGRAM(S): 80 TABLET, FILM COATED ORAL at 21:13

## 2021-09-14 RX ADMIN — Medication 50 GRAM(S): at 20:30

## 2021-09-14 RX ADMIN — SEVELAMER CARBONATE 800 MILLIGRAM(S): 2400 POWDER, FOR SUSPENSION ORAL at 20:30

## 2021-09-14 RX ADMIN — Medication 1 GRAM(S): at 20:31

## 2021-09-14 RX ADMIN — PANTOPRAZOLE SODIUM 40 MILLIGRAM(S): 20 TABLET, DELAYED RELEASE ORAL at 21:13

## 2021-09-14 RX ADMIN — GABAPENTIN 100 MILLIGRAM(S): 400 CAPSULE ORAL at 21:13

## 2021-09-14 RX ADMIN — APIXABAN 5 MILLIGRAM(S): 2.5 TABLET, FILM COATED ORAL at 20:31

## 2021-09-14 RX ADMIN — LEVETIRACETAM 750 MILLIGRAM(S): 250 TABLET, FILM COATED ORAL at 20:31

## 2021-09-14 RX ADMIN — Medication 20 MILLIGRAM(S): at 20:31

## 2021-09-14 NOTE — DISCHARGE NOTE PROVIDER - NSDCMRMEDTOKEN_GEN_ALL_CORE_FT
ascorbic acid 500 mg oral tablet: 1 tab(s) orally once a day  aspirin 81 mg oral delayed release tablet: 1 tab(s) orally once a day  atorvastatin 40 mg oral tablet: 1 tab(s) orally once a day (at bedtime)  Eliquis 5 mg oral tablet: 1  orally once a day  gabapentin 100 mg oral capsule: 1 cap(s) orally once a day (at bedtime)  levETIRAcetam 750 mg oral tablet: 1 tab(s) orally 2 times a day  losartan 25 mg oral tablet: 1 tab(s) orally once a day  metoprolol succinate 50 mg oral tablet, extended release: 1 tab(s) orally once a day   pantoprazole 40 mg oral delayed release tablet: 1 tab(s) orally once a day  sevelamer hydrochloride 800 mg oral tablet: 1 tab(s) orally 2 times a day (with meals)  Tab-A-Russel oral tablet: 1 tab(s) orally once a day   ascorbic acid 500 mg oral tablet: 1 tab(s) orally once a day  aspirin 81 mg oral delayed release tablet: 1 tab(s) orally once a day  atorvastatin 40 mg oral tablet: 1 tab(s) orally once a day (at bedtime)  Eliquis 5 mg oral tablet: 1  orally once a day  furosemide 20 mg oral tablet: 1 tab(s) orally once a day x 3 days   gabapentin 100 mg oral capsule: 1 cap(s) orally once a day (at bedtime)  levETIRAcetam 750 mg oral tablet: 1 tab(s) orally 2 times a day  losartan 25 mg oral tablet: 1 tab(s) orally once a day  metoprolol succinate 25 mg oral tablet, extended release: 1 tab(s) orally once a day  pantoprazole 40 mg oral delayed release tablet: 1 tab(s) orally every 12 hours x 14 days and then decrease to daily   sevelamer hydrochloride 800 mg oral tablet: 1 tab(s) orally 2 times a day (with meals)  sucralfate 1 g/10 mL oral suspension: 10 milliliter(s) orally 2 times a day  Tab-A-Russel oral tablet: 1 tab(s) orally once a day

## 2021-09-14 NOTE — DISCHARGE NOTE PROVIDER - NSDCCPTREATMENT_GEN_ALL_CORE_FT
PRINCIPAL PROCEDURE  Procedure: Cardiac ablation  Findings and Treatment: [facility] will call you to schedule a 2 week post op follow up, please call [phone] if you dont hear from them or need to be seen sooner.  - Bruising at the groin, sometimes extending down the leg, and/or a small lump under the skin at the groin access site is normal and will resolve within 2 – 3 weeks.   - Occasional skipped beats or palpitations that last for a few beats are common and generally resolve within 1-2 months.   - You make walk and take stairs at a regular pace.   - Do not perform any exercise more strenuous than walking for 1 week.   - Do not strain or lift heavy objects for 1 week.  - You may shower the day after the procedure.  - Do not soak in water (such as tub baths, hot tubs, swimming, etc.) for 1 week.   - You may resume all other activities the day after the procedure.  Call your doctor if:   - you notice bleeding, redness, drainage, swelling, increased tenderness or a hot sensation around the catheter insertion site.   - your temperature is greater than 100 degrees F for more than 24 hours.  - your rapid heart rhythm returns.  - you have any questions or concerns regarding the procedure.  If significant bleeding and/or a large lump (the size of a golf ball or bigger) occurs:  - Lie flat and apply continuous direct pressure just above the puncture site for at least 10 minutes  - If the issue resolves, notify your physician immediately.    - If the bleeding cannot be controlled, please seek immediate medical attention.  If you experience increased difficulty breathing or chest pain, or if you faint or have dizzy spells, please seek immediate medical attention.

## 2021-09-14 NOTE — DISCHARGE NOTE PROVIDER - CARE PROVIDER_API CALL
James Trevino)  Cardiovascular Disease; Internal Medicine  [address] [place] [address]  Phone: [phone]  Fax: [phone]  Follow Up Time:     Felice Frankel)  Cardiovascular Disease; Interventional Cardiology  [address], [address]  [place] [address]  Phone: [phone]  Fax: [phone]  Follow Up Time:

## 2021-09-14 NOTE — H&P PST ADULT - COMMENTS
Denies fevers, chills, CP, SOB, ZUNIGA, cough, abdominal pain, N/V/D, hematuria, bloody or dark colored stools     + palpitations, dizziness

## 2021-09-14 NOTE — PROGRESS NOTE ADULT - SUBJECTIVE AND OBJECTIVE BOX
PROCEDURE(S): Radiofrequency Ablation of Atrial Fibrillation and Atrial Flutter    ELECTROPHYSIOLOGIST(S): Ramin Mosqueda MD         COMPLICATIONS:  none        DISPOSITION:  observation     CONDITION: stable      Pt doing well s/p atrial fibrillation and atrial flutter ablation (WACA/PVI, CTI and mitral flutter) via b/l FV. Hemostasis achieved via b/l figure 8 groin sutures. Denies complaint.       MEDICATIONS  (STANDING):  apixaban 5 milliGRAM(s) Oral <User Schedule>  ascorbic acid 500 milliGRAM(s) Oral daily  aspirin enteric coated 81 milliGRAM(s) Oral daily  atorvastatin 40 milliGRAM(s) Oral at bedtime  furosemide   Injectable 20 milliGRAM(s) IV Push once  gabapentin 100 milliGRAM(s) Oral at bedtime  levETIRAcetam 750 milliGRAM(s) Oral two times a day  losartan 25 milliGRAM(s) Oral daily  magnesium sulfate  IVPB 2 Gram(s) IV Intermittent once  metoprolol succinate ER 25 milliGRAM(s) Oral daily  multivitamin 1 Tablet(s) Oral daily  pantoprazole    Tablet 40 milliGRAM(s) Oral every 12 hours  sevelamer carbonate 800 milliGRAM(s) Oral every 12 hours  sucralfate suspension 1 Gram(s) Oral two times a day    MEDICATIONS  (PRN):  acetaminophen   Tablet .. 650 milliGRAM(s) Oral every 6 hours PRN Mild Pain (1 - 3)  ALPRAZolam 0.25 milliGRAM(s) Oral every 6 hours PRN anxiety/insomnia  aluminum hydroxide/magnesium hydroxide/simethicone Suspension 30 milliLiter(s) Oral every 4 hours PRN Dyspepsia  benzocaine 15 mG/menthol 3.6 mG (Sugar-Free) Lozenge 1 Lozenge Oral every 2 hours PRN Sore Throat  ondansetron Injectable 4 milliGRAM(s) IV Push every 8 hours PRN Nausea and/or Vomiting      Allergies  penicillin (Other)    Exam:   HR:   BP:   RR:   SpO2:     VSS, NAD, A&O x 3  Card: S1/S2, RRR, no m/g/r  Resp: lungs CTA b/l  Abd: S/NT/ND  Groins: hemostatic sutures in place; sites C/D/I; no bleeding, hematoma, erythema, exudate or edema  Ext: no edema; distal pulses intact    I/Os: net +     ECG:    Assessment:   60 y/o male with a PMHx of psoriasis, Cocaine abuse (quit 2010), Aortic dissection s/p emergent repair (2010), surgery complicated by CVA w/ residual left sided weakness and bowel ischemia s/p bowel resection and ostomy with reversal repair, Covid infection, ESRD on HD (Tu/Thur/Sat) s/p LUE AVF, HTN, seizure disorder, GIB s/p colonoscopy and AVMs cauterization 3/2021 and CAD s/p remote stent to LAD and recent CABG x 2 (DGV-OM, RPDA with Dr. Butler 11/2020), AI and MR s/p tAVR, tMVR (11/2020 Dr. Butler) with post-op pAfib/flutter (on eliquis), HFrEF (EF 45-50% 02/21) s/p DCCV 6/2021. He presented electively on 9/14/2021 for and is now status post uncomplicated radiofrequency ablation atrial fibrillation and atrial flutter (WACA/PVI, CTI and mitral flutter) via b/l FV. Hemostasis achieved via b/l figure 8 groin sutures.       Plan:   Bedrest x 4 hours, then OOB with assistance and progress as tolerated.   Groin sutures to be removed by EP service in AM.   Radial art line to be removed once pt fully awake with stable vitals >1 hour post op.   Pending groin status: 5 mg PO to resume at 20:30 tonight.   Lasix 20mg IV x 1 dose once ambulating, then Lasix 20mg PO daily x 3 days.   Start Amiodarone 200mg daily and decrease Metoprolol to 25mg daily.  Continue other home medications.   Start Protonix 40mg twice daily x 2 weeks, then once daily x 6 weeks.   Carafate 1gm BID x 2 weeks.   Strict I/Os.  Please encourage incentive spirometry and ambulation once able.  Observation and monitoring on telemetry overnight with anticipated discharge in the AM and outpt follow up in 2-4 weeks.  PROCEDURE(S): Radiofrequency Ablation of Atrial Fibrillation and Atrial Flutter    ELECTROPHYSIOLOGIST(S): Ramin Mosqueda MD         COMPLICATIONS:  none        DISPOSITION:  observation     CONDITION: stable      Pt doing well s/p atrial fibrillation and atrial flutter ablation (WACA/PVI, CTI and mitral flutter) via b/l FV. Hemostasis achieved via b/l figure 8 groin sutures. Denies complaint.     MUSTAPHA: Perivalvular leak mitral valve. No thrombus (official read pending)       MEDICATIONS  (STANDING):  apixaban 5 milliGRAM(s) Oral <User Schedule>  ascorbic acid 500 milliGRAM(s) Oral daily  aspirin enteric coated 81 milliGRAM(s) Oral daily  atorvastatin 40 milliGRAM(s) Oral at bedtime  furosemide   Injectable 20 milliGRAM(s) IV Push once  gabapentin 100 milliGRAM(s) Oral at bedtime  levETIRAcetam 750 milliGRAM(s) Oral two times a day  losartan 25 milliGRAM(s) Oral daily  magnesium sulfate  IVPB 2 Gram(s) IV Intermittent once  metoprolol succinate ER 25 milliGRAM(s) Oral daily  multivitamin 1 Tablet(s) Oral daily  pantoprazole    Tablet 40 milliGRAM(s) Oral every 12 hours  sevelamer carbonate 800 milliGRAM(s) Oral every 12 hours  sucralfate suspension 1 Gram(s) Oral two times a day    MEDICATIONS  (PRN):  acetaminophen   Tablet .. 650 milliGRAM(s) Oral every 6 hours PRN Mild Pain (1 - 3)  ALPRAZolam 0.25 milliGRAM(s) Oral every 6 hours PRN anxiety/insomnia  aluminum hydroxide/magnesium hydroxide/simethicone Suspension 30 milliLiter(s) Oral every 4 hours PRN Dyspepsia  benzocaine 15 mG/menthol 3.6 mG (Sugar-Free) Lozenge 1 Lozenge Oral every 2 hours PRN Sore Throat  ondansetron Injectable 4 milliGRAM(s) IV Push every 8 hours PRN Nausea and/or Vomiting      Allergies  penicillin (Other)    Exam:   HR: 72  BP: 129/60  RR: 16  SpO2: 95% RA    VSS, NAD, A&O x 3  Card: S1/S2, RRR, +murmur  Resp: lungs CTA b/l  Abd: S/NT/ND  Groins: hemostatic sutures in place; sites C/D/I; no bleeding, hematoma, erythema, exudate or edema  Ext: L>R edema; distal pulses intact    I/Os: net + 2550    ECG:    Assessment:   62 y/o male with a PMHx of psoriasis, Cocaine abuse (quit 2010), Aortic dissection s/p emergent repair (2010), surgery complicated by CVA w/ residual left sided weakness and bowel ischemia s/p bowel resection and ostomy with reversal repair, Covid infection, ESRD on HD (Tu/Thur/Sat) s/p LUE AVF, HTN, seizure disorder, GIB s/p colonoscopy and AVMs cauterization 3/2021 and CAD s/p remote stent to LAD and recent CABG x 2 (DGV-OM, RPDA with Dr. Butler 11/2020), AI and MR s/p tAVR, tMVR (11/2020 Dr. Butler) with post-op pAfib/flutter (on eliquis), HFrEF (EF 45-50% 02/21) s/p DCCV 6/2021. He presented electively on 9/14/2021 for and is now status post uncomplicated radiofrequency ablation atrial fibrillation and atrial flutter (WACA/PVI, CTI and mitral flutter) via b/l FV. Hemostasis achieved via b/l figure 8 groin sutures.       Plan:   Bedrest x 4 hours, then OOB with assistance and progress as tolerated.   Groin sutures to be removed by EP service in AM.   Pending groin status: 5 mg PO to resume at 20:30 tonight.   Lasix 20mg IV x 1 dose once ambulating, then Lasix 20mg PO daily x 3 days.   Start Amiodarone 200mg daily and decrease Metoprolol to 25mg daily.  Continue other home medications.   Start Protonix 40mg twice daily x 2 weeks, then once daily x 6 weeks.   Carafate 1gm BID x 2 weeks.   Strict I/Os.  Please encourage incentive spirometry and ambulation once able.  Observation and monitoring on telemetry overnight with anticipated discharge in the AM and outpt follow up in 2-4 weeks.  PROCEDURE(S): Radiofrequency Ablation of Atrial Fibrillation and Atrial Flutter    ELECTROPHYSIOLOGIST(S): James Trevino MD         COMPLICATIONS:  none        DISPOSITION:  observation     CONDITION: stable      Pt doing well s/p atrial fibrillation and atrial flutter ablation (WACA/PVI, CTI and mitral flutter) via b/l FV. Hemostasis achieved via b/l figure 8 groin sutures. Denies complaint.     MUSTAPHA: Perivalvular leak mitral valve. No thrombus (official read pending)       MEDICATIONS  (STANDING):  apixaban 5 milliGRAM(s) Oral <User Schedule>  ascorbic acid 500 milliGRAM(s) Oral daily  aspirin enteric coated 81 milliGRAM(s) Oral daily  atorvastatin 40 milliGRAM(s) Oral at bedtime  furosemide   Injectable 20 milliGRAM(s) IV Push once  gabapentin 100 milliGRAM(s) Oral at bedtime  levETIRAcetam 750 milliGRAM(s) Oral two times a day  losartan 25 milliGRAM(s) Oral daily  magnesium sulfate  IVPB 2 Gram(s) IV Intermittent once  metoprolol succinate ER 25 milliGRAM(s) Oral daily  multivitamin 1 Tablet(s) Oral daily  pantoprazole    Tablet 40 milliGRAM(s) Oral every 12 hours  sevelamer carbonate 800 milliGRAM(s) Oral every 12 hours  sucralfate suspension 1 Gram(s) Oral two times a day    MEDICATIONS  (PRN):  acetaminophen   Tablet .. 650 milliGRAM(s) Oral every 6 hours PRN Mild Pain (1 - 3)  ALPRAZolam 0.25 milliGRAM(s) Oral every 6 hours PRN anxiety/insomnia  aluminum hydroxide/magnesium hydroxide/simethicone Suspension 30 milliLiter(s) Oral every 4 hours PRN Dyspepsia  benzocaine 15 mG/menthol 3.6 mG (Sugar-Free) Lozenge 1 Lozenge Oral every 2 hours PRN Sore Throat  ondansetron Injectable 4 milliGRAM(s) IV Push every 8 hours PRN Nausea and/or Vomiting      Allergies  penicillin (Other)    Exam:   HR: 72  BP: 129/60  RR: 16  SpO2: 95% RA    VSS, NAD, A&O x 3  Card: S1/S2, RRR, +murmur  Resp: lungs CTA b/l  Abd: S/NT/ND  Groins: hemostatic sutures in place; sites C/D/I; no bleeding, hematoma, erythema, exudate or edema  Ext: L>R edema; distal pulses intact    I/Os: net + 2550    ECG:    Assessment:   60 y/o male with a PMHx of psoriasis, Cocaine abuse (quit 2010), Aortic dissection s/p emergent repair (2010), surgery complicated by CVA w/ residual left sided weakness and bowel ischemia s/p bowel resection and ostomy with reversal repair, Covid infection, ESRD on HD (Tu/Thur/Sat) s/p LUE AVF, HTN, seizure disorder, GIB s/p colonoscopy and AVMs cauterization 3/2021 and CAD s/p remote stent to LAD and recent CABG x 2 (DGV-OM, RPDA with Dr. Butler 11/2020), AI and MR s/p tAVR, tMVR (11/2020 Dr. Butler) with post-op pAfib/flutter (on eliquis), HFrEF (EF 45-50% 02/21) s/p DCCV 6/2021. He presented electively on 9/14/2021 for and is now status post uncomplicated radiofrequency ablation atrial fibrillation and atrial flutter (WACA/PVI, CTI and mitral flutter) via b/l FV. Hemostasis achieved via b/l figure 8 groin sutures.       Plan:   Bedrest x 4 hours, then OOB with assistance and progress as tolerated.   Groin sutures to be removed by EP service in AM.   Pending groin status: 5 mg PO to resume at 20:30 tonight.   Lasix 20mg IV x 1 dose once ambulating, then Lasix 20mg PO daily x 3 days.   Start Amiodarone 200mg daily and decrease Metoprolol to 25mg daily.  Continue other home medications.   Start Protonix 40mg twice daily x 2 weeks, then once daily x 6 weeks.   Carafate 1gm BID x 2 weeks.   Strict I/Os.  Please encourage incentive spirometry and ambulation once able.  Observation and monitoring on telemetry overnight with anticipated discharge in the AM and outpt follow up in 2-4 weeks.  PROCEDURE(S): Radiofrequency Ablation of Atrial Fibrillation and Atrial Flutter    ELECTROPHYSIOLOGIST(S): James Trevino MD         COMPLICATIONS:  none        DISPOSITION:  observation     CONDITION: stable      Pt doing well s/p atrial fibrillation and atrial flutter ablation (WACA/PVI, CTI and mitral flutter) via b/l FV. Hemostasis achieved via b/l figure 8 groin sutures. Denies complaint.     MUSTAPHA: Perivalvular leak mitral valve. No thrombus (official read pending)       MEDICATIONS  (STANDING):  apixaban 5 milliGRAM(s) Oral <User Schedule>  ascorbic acid 500 milliGRAM(s) Oral daily  aspirin enteric coated 81 milliGRAM(s) Oral daily  atorvastatin 40 milliGRAM(s) Oral at bedtime  furosemide   Injectable 20 milliGRAM(s) IV Push once  gabapentin 100 milliGRAM(s) Oral at bedtime  levETIRAcetam 750 milliGRAM(s) Oral two times a day  losartan 25 milliGRAM(s) Oral daily  magnesium sulfate  IVPB 2 Gram(s) IV Intermittent once  metoprolol succinate ER 25 milliGRAM(s) Oral daily  multivitamin 1 Tablet(s) Oral daily  pantoprazole    Tablet 40 milliGRAM(s) Oral every 12 hours  sevelamer carbonate 800 milliGRAM(s) Oral every 12 hours  sucralfate suspension 1 Gram(s) Oral two times a day    MEDICATIONS  (PRN):  acetaminophen   Tablet .. 650 milliGRAM(s) Oral every 6 hours PRN Mild Pain (1 - 3)  ALPRAZolam 0.25 milliGRAM(s) Oral every 6 hours PRN anxiety/insomnia  aluminum hydroxide/magnesium hydroxide/simethicone Suspension 30 milliLiter(s) Oral every 4 hours PRN Dyspepsia  benzocaine 15 mG/menthol 3.6 mG (Sugar-Free) Lozenge 1 Lozenge Oral every 2 hours PRN Sore Throat  ondansetron Injectable 4 milliGRAM(s) IV Push every 8 hours PRN Nausea and/or Vomiting      Allergies  penicillin (Other)    Exam:   HR: 72  BP: 129/60  RR: 16  SpO2: 95% RA    VSS, NAD, A&O x 3  Card: S1/S2, RRR, +murmur  Resp: lungs CTA b/l  Abd: S/NT/ND  Groins: hemostatic sutures in place; sites C/D/I; no bleeding, hematoma, erythema, exudate or edema  Ext: L>R edema; distal pulses intact    I/Os: net + 2550    ECG: Ectopic atrial rhythm with some junctional beats. HR 71bpm, LAD    Assessment:   60 y/o male with a PMHx of psoriasis, Cocaine abuse (quit 2010), Aortic dissection s/p emergent repair (2010), surgery complicated by CVA w/ residual left sided weakness and bowel ischemia s/p bowel resection and ostomy with reversal repair, Covid infection, ESRD on HD (Tu/Thur/Sat) s/p LUE AVF, HTN, seizure disorder, GIB s/p colonoscopy and AVMs cauterization 3/2021 and CAD s/p remote stent to LAD and recent CABG x 2 (DGV-OM, RPDA with Dr. Butler 11/2020), AI and MR s/p tAVR, tMVR (11/2020 Dr. Butler) with post-op pAfib/flutter (on eliquis), HFrEF (EF 45-50% 02/21) s/p DCCV 6/2021. He presented electively on 9/14/2021 for and is now status post uncomplicated radiofrequency ablation atrial fibrillation and atrial flutter (WACA/PVI, CTI and mitral flutter) via b/l FV. Hemostasis achieved via b/l figure 8 groin sutures.       Plan:   Bedrest x 4 hours, then OOB with assistance and progress as tolerated.   Groin sutures to be removed by EP service in AM.   Pending groin status: 5 mg PO to resume at 20:30 tonight.   Lasix 20mg IV x 1 dose once ambulating, then Lasix 20mg PO daily x 3 days.   Consider starting Amiodarone tomorrow at 200mg daily pending reevaluation. (Pt post ECG with an atrial ectopic rhythm with some junctional beats).    Decrease Metoprolol to 25mg daily.  Continue other home medications.   Start Protonix 40mg twice daily x 2 weeks, then once daily x 6 weeks.   Carafate 1gm BID x 2 weeks.   Strict I/Os.  Please encourage incentive spirometry and ambulation once able.  Observation and monitoring on telemetry overnight with anticipated discharge in the AM and outpt follow up in 2-4 weeks.

## 2021-09-14 NOTE — H&P PST ADULT - ASSESSMENT
60 y/o male with a PMHx of psoriasis, Cocaine abuse (quit 2010), Aortic dissection s/p emergent repair (2010), surgery complicated by CVA w/ residual left sided weakness and bowel ischemia s/p bowel resection and ostomy with reversal repair, Covid infection, ESRD on HD (Tu/Thur/Sat) s/p LUE AVF, HTN, seizure disorder, GIB s/p colonoscopy and AVMs cauterization 3/2021 and CAD s/p remote stent to LAD and recent CABG x 2 (DGV-OM, RPDA with Dr. Butler 11/2020), AI and MR s/p tAVR, tMVR (11/2020 Dr. Butler) with post-op pAfib/flutter (on eliquis), HFrEF (EF 45-50% 02/21) s/p DCCV 6/2021. He presents electively today for atrial fibrillation ablation with preceding MUSTAPHA.     Plan:  Consent w/ Attending  Stat labs & ecg  Covid pcr negative 9/11/2021  NPO ***  Last eliquis **** 62 y/o male with a PMHx of psoriasis, Cocaine abuse (quit 2010), Aortic dissection s/p emergent repair (2010), surgery complicated by CVA w/ residual left sided weakness and bowel ischemia s/p bowel resection and ostomy with reversal repair, Covid infection, ESRD on HD (Tu/Thur/Sat) s/p LUE AVF, HTN, seizure disorder, GIB s/p colonoscopy and AVMs cauterization 3/2021 and CAD s/p remote stent to LAD and recent CABG x 2 (DGV-OM, RPDA with Dr. Butler 11/2020), AI and MR s/p tAVR, tMVR (11/2020 Dr. Butler) with post-op pAfib/flutter (on eliquis), HFrEF (EF 45-50% 02/21) s/p DCCV 6/2021. He presents electively today for atrial fibrillation ablation with preceding MUSTAPHA.     Plan:  Consent w/ Attending  Stat labs & ecg  Covid pcr negative 9/11/2021  NPO > 10 hours confirmed  Last eliquis dose yesterday morning  Dialysis this morning

## 2021-09-14 NOTE — H&P PST ADULT - HISTORY OF PRESENT ILLNESS
62 y/o male with a PMHx of psoriasis, Cocaine abuse (quit ), Aortic dissection s/p emergent repair (), surgery complicated by CVA w/ residual left sided weakness and bowel ischemia s/p bowel resection and ostomy with reversal repair, Covid infection, ESRD on HD (/Thur/Sat) s/p LUE AVF, HTN, seizure disorder, GIB s/p colonoscopy and AVMs cauterization 3/2021 and CAD s/p remote stent to LAD and recent CABG x 2 (DGV-OM, RPDA with Dr. Butler 2020), AI and MR s/p tAVR, tMVR (2020 Dr. Butler) with post-op pAfib/flutter (on eliquis), HFrEF (EF 45-50% ), who presented to the ER with complaints of shortness of breath and palpitations x 3 days found to be fluid overloaded with symptomatic atrial flutter w/ RVR. 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 Patient with a known chronic type B dissection noted on preop CT scan (2020), however no mention of issues in the ascending aorta. DCCV was canceled and CTSx consult appreciated. CT scan showed CTSx follow up appreciated, they reviewed CT scans and reports. Type B aortic dissection appears stable since 2020. 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 No contrast extravasation or hematoma. Patient had been maintained on Eliquis prior to admission. Ultimately underwent limited MUSTAPHA which demonstrated no BLAINE thrombus and DCCV with restoration of sinus rhythm. Had a 9 second conversion pause at time of cardioversion. Was monitored on Telemetry and remain in sinus rhythm with HR in the 60-70s. Was discharged on Toprol, Amiodarone 100mg and Eliquis. On, follow-up, he was maintaining sinus rhythm. Notable of ECG where frequent multifocal PVCS (possibly papillary muscle in origin). Complaints of intermittent rapid palpitations consistent possible PAF with RVR. NO CP, SOB, ZUNIGA, dizziness, syncope or presyncope. NO LE edema or orthopnea. He presents electively today for atrial fibrillation ablation with preceding MUSTAPHA.     Cardiology Summary  EC21: Sinus rhythm, multifocal PVC   TTE: 2020: LVEF 40%. Moderately decreased global left ventricular systolic function. Moderately reduced RV systolic function. A bioprosthetic valve is present in the mitral position. Trace mitral valve regurgitation. Moderate tricuspid regurgitation. Bioprosthesis in the aortic position. Estimated pulmonary artery systolic pressure is 40.9 mmHg assuming a right atrial pressure of 8 mmHg, which is consistent with mild pulmonary hypertension. Mitral valve mean gradient is 2.2 mmHg consistent with mild mitral stenosis.          60 y/o male with a PMHx of psoriasis, Cocaine abuse (quit ), Aortic dissection s/p emergent repair (), surgery complicated by CVA w/ residual left sided weakness and bowel ischemia s/p bowel resection and ostomy with reversal repair, Covid infection, ESRD on HD (/Thur/Sat) s/p LUE AVF, HTN, seizure disorder, GIB s/p colonoscopy and AVMs cauterization 3/2021 and CAD s/p remote stent to LAD and recent CABG x 2 (DGV-OM, RPDA with Dr. Butler 2020), AI and MR s/p tAVR, tMVR (2020 Dr. Butler) with post-op pAfib/flutter (on eliquis), HFrEF (EF 45-50% ), who was recently admitted to the hospital with shortness of breath and palpitations found to be fluid overloaded with symptomatic atrial flutter w/ RVR. 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 Patient with a known chronic type B dissection noted on preop CT scan (2020), however no mention of issues in the ascending aorta. DCCV was canceled and was seen and cleared by CTSx, who reviewed CT scans which was c/w a stable Type B aortic dissection (stable aortic root aneurysm and ascending thoracic aorta. No contrast extravasation or hematoma). Ultimately, he did undergo a limited MUSTAPHA which demonstrated no BLAINE thrombus and DCCV with restoration of sinus rhythm. Had a 9 second conversion pause at time of cardioversion. Was monitored on Telemetry and remain in sinus rhythm with HR in the 60-70s. Was discharged on Toprol, Amiodarone 100mg and Eliquis. On, follow-up, he was maintaining sinus rhythm. Notable of ECG where frequent multifocal PVCS (possibly papillary muscle in origin). Complaints of intermittent rapid palpitations consistent possible PAF with RVR. NO CP, SOB, ZUNIGA, dizziness, syncope or presyncope. NO LE edema or orthopnea. He presents electively today for atrial fibrillation ablation with preceding MUSTAPHA.     Cardiology Summary  EC21: Sinus rhythm, multifocal PVC   TTE: 2020: LVEF 40%. Moderately decreased global left ventricular systolic function. Moderately reduced RV systolic function. A bioprosthetic valve is present in the mitral position. Trace mitral valve regurgitation. Moderate tricuspid regurgitation. Bioprosthesis in the aortic position. Estimated pulmonary artery systolic pressure is 40.9 mmHg assuming a right atrial pressure of 8 mmHg, which is consistent with mild pulmonary hypertension. Mitral valve mean gradient is 2.2 mmHg consistent with mild mitral stenosis.

## 2021-09-14 NOTE — DISCHARGE NOTE PROVIDER - NSDCFUSCHEDAPPT_GEN_ALL_CORE_FT
JONATHAN GIBSON ; 09/16/2021 ; NPP Vascular [address] [facility]  JONATHAN GIBSON ; 10/20/2021 ; NPP Rad Cat [address]  JONATHAN GIBSON ; 10/20/2021 ; NPP Rad Cat [address]  JONATHAN GIBSON ; 12/02/2021 ; NPP Cardio [address]

## 2021-09-14 NOTE — H&P PST ADULT - NSICDXPASTSURGICALHX_GEN_ALL_CORE_FT
PAST SURGICAL HISTORY:  H/O aortic dissection Type A; emergent repair 2010    H/O colectomy     S/P AVR (aortic valve replacement) (t)AVR 11/2020 Dr Butler    S/P MVR (mitral valve replacement) (t)MVR 11/2020 Dr Butler    Status post double vessel coronary artery bypass 11/2020 Dr Butler     PAST SURGICAL HISTORY:  AV fistula LUE    H/O aortic dissection Type A; emergent repair 2010    H/O colectomy     S/P AVR (aortic valve replacement) (t)AVR 11/2020 Dr Butler    S/P MVR (mitral valve replacement) (t)MVR 11/2020 Dr Butler    Status post double vessel coronary artery bypass 11/2020 Dr Butler

## 2021-09-14 NOTE — H&P PST ADULT - NSICDXPASTMEDICALHX_GEN_ALL_CORE_FT
PAST MEDICAL HISTORY:  Atrial fibrillation     CHF (congestive heart failure)     COVID-19 october 2020    CVA (cerebral vascular accident)     ESRD on dialysis Tu/Thurs/Sat    Former smoker     H/O aortic dissection s/p repair (2010), surgery complicated by bowel ischemia s/p bowel resection and ostomy with reversal    H/O aortic valve insufficiency     History of cocaine use remote hx, currently sober    HTN (hypertension)     Hyperlipemia     Mitral regurgitation     Seizures last seizure 10 years ago     PAST MEDICAL HISTORY:  Atrial fibrillation     CAD (coronary artery disease) s/p PCI 1998  and CABG x 2 11/2020    CHF (congestive heart failure)     COVID-19 october 2020    CVA (cerebral vascular accident)     ESRD on dialysis Tu/Thurs/Sat    Former smoker     GIB (gastrointestinal bleeding)     H/O aortic dissection s/p repair (2010), surgery complicated by bowel ischemia s/p bowel resection and ostomy with reversal    H/O aortic valve insufficiency     History of cocaine use remote hx, currently sober    HTN (hypertension)     Hyperlipemia     Mitral regurgitation     Seizures last seizure 10 years ago

## 2021-09-14 NOTE — PROGRESS NOTE ADULT - SUBJECTIVE AND OBJECTIVE BOX
Admission Criteria  Please admit the patient to the following service:  Major Criteria:    - LV dysfunction EF 40%      Major Criteria:    - Continuous EKG monitoring is required for condition causing arrhythmia (hyperkalemia, etc) s/p AF ablation   - Significant hypotension during procedure requiring intraop pressor support   - Significant volume load > 200 ml      Admit to: (1 Major ciriteria/2 or more minor criteria) Patient is being admitted to the inpatient service due to high risk characteristics and need for further management/monitoring and is considered to be at a significantly increased risk of major adverse cardiac and vascular events if discharged.

## 2021-09-14 NOTE — DISCHARGE NOTE PROVIDER - HOSPITAL COURSE
60 y/o male with a PMHx of psoriasis, Cocaine abuse (quit 2010), Aortic dissection s/p emergent repair (2010), surgery complicated by CVA w/ residual left sided weakness and bowel ischemia s/p bowel resection and ostomy with reversal repair, Covid infection, ESRD on HD (Tu/Thur/Sat) s/p LUE AVF, HTN, seizure disorder, GIB s/p colonoscopy and AVMs cauterization 3/2021 and CAD s/p remote stent to LAD and recent CABG x 2 (DGV-OM, RPDA with Dr. Butler 11/2020), AI and MR s/p tAVR, tMVR (11/2020 Dr. Butler) with post-op pAfib/flutter (on eliquis), HFrEF (EF 45-50% 02/21) s/p DCCV 6/2021. He presented electively on 9/14/2021 for and is now POD #1 status post uncomplicated atrial fibrillation and atrial flutter ablatio (WACA/PVI, CTI and mitral flutter) via b/l FV.  Groin sutures removed without incident, pt is ambulating and urinating without difficulty (on HD but still makes urine). Pt has scheduled HD tomorrow 9/16/21.  Groin care, restrictions, medication changes and outpt f/u reviewed with pt, all questions answered.

## 2021-09-15 ENCOUNTER — TRANSCRIPTION ENCOUNTER (OUTPATIENT)
Age: 61
End: 2021-09-15

## 2021-09-15 VITALS
SYSTOLIC BLOOD PRESSURE: 140 MMHG | DIASTOLIC BLOOD PRESSURE: 55 MMHG | RESPIRATION RATE: 17 BRPM | HEART RATE: 70 BPM | OXYGEN SATURATION: 100 %

## 2021-09-15 LAB
ANION GAP SERPL CALC-SCNC: 14 MMOL/L — SIGNIFICANT CHANGE UP (ref 5–17)
BUN SERPL-MCNC: 23.8 MG/DL — HIGH (ref 8–20)
CALCIUM SERPL-MCNC: 8.5 MG/DL — LOW (ref 8.6–10.2)
CHLORIDE SERPL-SCNC: 103 MMOL/L — SIGNIFICANT CHANGE UP (ref 98–107)
CO2 SERPL-SCNC: 22 MMOL/L — SIGNIFICANT CHANGE UP (ref 22–29)
COVID-19 SPIKE DOMAIN AB INTERP: POSITIVE
COVID-19 SPIKE DOMAIN ANTIBODY RESULT: >250 U/ML — HIGH
CREAT SERPL-MCNC: 2.04 MG/DL — HIGH (ref 0.5–1.3)
GLUCOSE SERPL-MCNC: 129 MG/DL — HIGH (ref 70–99)
HCT VFR BLD CALC: 29.6 % — LOW (ref 39–50)
HGB BLD-MCNC: 9.4 G/DL — LOW (ref 13–17)
MAGNESIUM SERPL-MCNC: 2.4 MG/DL — SIGNIFICANT CHANGE UP (ref 1.6–2.6)
MCHC RBC-ENTMCNC: 29.7 PG — SIGNIFICANT CHANGE UP (ref 27–34)
MCHC RBC-ENTMCNC: 31.8 GM/DL — LOW (ref 32–36)
MCV RBC AUTO: 93.7 FL — SIGNIFICANT CHANGE UP (ref 80–100)
PLATELET # BLD AUTO: 161 K/UL — SIGNIFICANT CHANGE UP (ref 150–400)
POTASSIUM SERPL-MCNC: 4 MMOL/L — SIGNIFICANT CHANGE UP (ref 3.5–5.3)
POTASSIUM SERPL-SCNC: 4 MMOL/L — SIGNIFICANT CHANGE UP (ref 3.5–5.3)
RBC # BLD: 3.16 M/UL — LOW (ref 4.2–5.8)
RBC # FLD: 14.1 % — SIGNIFICANT CHANGE UP (ref 10.3–14.5)
SARS-COV-2 IGG+IGM SERPL QL IA: >250 U/ML — HIGH
SARS-COV-2 IGG+IGM SERPL QL IA: POSITIVE
SODIUM SERPL-SCNC: 139 MMOL/L — SIGNIFICANT CHANGE UP (ref 135–145)
WBC # BLD: 5.33 K/UL — SIGNIFICANT CHANGE UP (ref 3.8–10.5)
WBC # FLD AUTO: 5.33 K/UL — SIGNIFICANT CHANGE UP (ref 3.8–10.5)

## 2021-09-15 PROCEDURE — 86850 RBC ANTIBODY SCREEN: CPT

## 2021-09-15 PROCEDURE — C1759: CPT

## 2021-09-15 PROCEDURE — 99232 SBSQ HOSP IP/OBS MODERATE 35: CPT

## 2021-09-15 PROCEDURE — C1889: CPT

## 2021-09-15 PROCEDURE — C1893: CPT

## 2021-09-15 PROCEDURE — 93613 INTRACARDIAC EPHYS 3D MAPG: CPT

## 2021-09-15 PROCEDURE — 93656 COMPRE EP EVAL ABLTJ ATR FIB: CPT

## 2021-09-15 PROCEDURE — C1894: CPT

## 2021-09-15 PROCEDURE — 93005 ELECTROCARDIOGRAM TRACING: CPT

## 2021-09-15 PROCEDURE — 86901 BLOOD TYPING SEROLOGIC RH(D): CPT

## 2021-09-15 PROCEDURE — 93320 DOPPLER ECHO COMPLETE: CPT

## 2021-09-15 PROCEDURE — 36415 COLL VENOUS BLD VENIPUNCTURE: CPT

## 2021-09-15 PROCEDURE — 85610 PROTHROMBIN TIME: CPT

## 2021-09-15 PROCEDURE — 85027 COMPLETE CBC AUTOMATED: CPT

## 2021-09-15 PROCEDURE — 86769 SARS-COV-2 COVID-19 ANTIBODY: CPT

## 2021-09-15 PROCEDURE — C1766: CPT

## 2021-09-15 PROCEDURE — 93010 ELECTROCARDIOGRAM REPORT: CPT

## 2021-09-15 PROCEDURE — 93312 ECHO TRANSESOPHAGEAL: CPT

## 2021-09-15 PROCEDURE — 80048 BASIC METABOLIC PNL TOTAL CA: CPT

## 2021-09-15 PROCEDURE — 93325 DOPPLER ECHO COLOR FLOW MAPG: CPT

## 2021-09-15 PROCEDURE — 93655 ICAR CATH ABLTJ DSCRT ARRHYT: CPT

## 2021-09-15 PROCEDURE — 93657 TX L/R ATRIAL FIB ADDL: CPT

## 2021-09-15 PROCEDURE — 83735 ASSAY OF MAGNESIUM: CPT

## 2021-09-15 PROCEDURE — 93662 INTRACARDIAC ECG (ICE): CPT

## 2021-09-15 PROCEDURE — C1732: CPT

## 2021-09-15 PROCEDURE — 86900 BLOOD TYPING SEROLOGIC ABO: CPT

## 2021-09-15 PROCEDURE — C1730: CPT

## 2021-09-15 PROCEDURE — C1731: CPT

## 2021-09-15 RX ORDER — METOPROLOL TARTRATE 50 MG
1 TABLET ORAL
Qty: 30 | Refills: 1
Start: 2021-09-15

## 2021-09-15 RX ORDER — PANTOPRAZOLE SODIUM 20 MG/1
1 TABLET, DELAYED RELEASE ORAL
Qty: 72 | Refills: 0
Start: 2021-09-15

## 2021-09-15 RX ORDER — FUROSEMIDE 40 MG
1 TABLET ORAL
Qty: 3 | Refills: 0
Start: 2021-09-15 | End: 2021-09-17

## 2021-09-15 RX ORDER — SUCRALFATE 1 G
10 TABLET ORAL
Qty: 280 | Refills: 0
Start: 2021-09-15 | End: 2021-09-28

## 2021-09-15 RX ADMIN — Medication 25 MILLIGRAM(S): at 05:39

## 2021-09-15 RX ADMIN — LEVETIRACETAM 750 MILLIGRAM(S): 250 TABLET, FILM COATED ORAL at 05:36

## 2021-09-15 RX ADMIN — Medication 20 MILLIGRAM(S): at 05:36

## 2021-09-15 RX ADMIN — Medication 650 MILLIGRAM(S): at 05:37

## 2021-09-15 RX ADMIN — APIXABAN 5 MILLIGRAM(S): 2.5 TABLET, FILM COATED ORAL at 05:36

## 2021-09-15 RX ADMIN — PANTOPRAZOLE SODIUM 40 MILLIGRAM(S): 20 TABLET, DELAYED RELEASE ORAL at 05:36

## 2021-09-15 RX ADMIN — LOSARTAN POTASSIUM 25 MILLIGRAM(S): 100 TABLET, FILM COATED ORAL at 05:36

## 2021-09-15 RX ADMIN — SEVELAMER CARBONATE 800 MILLIGRAM(S): 2400 POWDER, FOR SUSPENSION ORAL at 09:29

## 2021-09-15 RX ADMIN — Medication 650 MILLIGRAM(S): at 08:31

## 2021-09-15 RX ADMIN — Medication 1 GRAM(S): at 05:36

## 2021-09-15 NOTE — DISCHARGE NOTE NURSING/CASE MANAGEMENT/SOCIAL WORK - NSDCVIVACCINE_GEN_ALL_CORE_FT
influenza, injectable, quadrivalent, preservative free; 12-Dec-2020 09:28; Lynn Gerardo); [employer]; [id number] (Exp. Date: 30-Jun-2021); IntraMuscular; Deltoid Right.; 0.5 milliLiter(s); VIS (VIS Published: 15-Aug-2019, VIS Presented: 12-Dec-2020);

## 2021-09-15 NOTE — PROGRESS NOTE ADULT - SUBJECTIVE AND OBJECTIVE BOX
Pt doing very well POD #1 s/p MUSTAPHA & AF/AFL ablation. No overnight events.  b/l groin sutures removed without incident  Pt is ambulating and urinating without difficulty (on HD, but still makes urine). He denies CP, palp, SOB.      ECG: possible ectopic atrial rhythm (neg pwave in inf leads) at 69bpm  TELE: sinus vs ectopic atrial rhythm with 1:1conduction     MEDICATIONS  (STANDING):  apixaban 5 milliGRAM(s) Oral <User Schedule>  ascorbic acid 500 milliGRAM(s) Oral daily  aspirin enteric coated 81 milliGRAM(s) Oral daily  atorvastatin 40 milliGRAM(s) Oral at bedtime  furosemide    Tablet 20 milliGRAM(s) Oral daily  gabapentin 100 milliGRAM(s) Oral at bedtime  levETIRAcetam 750 milliGRAM(s) Oral two times a day  losartan 25 milliGRAM(s) Oral daily  metoprolol succinate ER 25 milliGRAM(s) Oral daily  multivitamin 1 Tablet(s) Oral daily  pantoprazole    Tablet 40 milliGRAM(s) Oral every 12 hours  sevelamer carbonate 800 milliGRAM(s) Oral every 12 hours  sucralfate suspension 1 Gram(s) Oral two times a day    Vital Signs Last 24 Hrs  T(C): 36.9 (15 Sep 2021 08:34), Max: 36.9 (15 Sep 2021 08:34)  T(F): 98.4 (15 Sep 2021 08:34), Max: 98.4 (15 Sep 2021 08:34)  HR: 69 (15 Sep 2021 08:34) (62 - 80)  BP: 143/50 (15 Sep 2021 08:34) (106/54 - 148/65)  RR: 18 (15 Sep 2021 08:34) (14 - 21)  SpO2: 100% (15 Sep 2021 08:34) (95% - 100%)    Physical Exam:  Constitutional: NAD, AAOx3  Cardiovascular: +S1S2 RRR  Pulmonary: CTA b/l, unlabored  GI: soft NTND +BS  Extremities: trace pitting edema b/l  Groins: b/l sutures removed, new 2x2/tegaderm placed, no bleeding, swelling, hematoma  LUE fistula +bruit +thrill       LABS:                        9.4    5.33  )-----------( 161      ( 15 Sep 2021 05:40 )             29.6     09-15    139  |  103  |  23.8<H>  ----------------------------<  129<H>  4.0   |  22.0  |  2.04<H>    Ca    8.5<L>      15 Sep 2021 05:40  Mg     2.4     09-15      PT/INR - ( 14 Sep 2021 10:33 )   PT: 15.1 sec;   INR: 1.32 ratio      < from: MUSTAPHA Echo Doppler (09.14.21 @ 11:52) >  Summary:   1. Technically good study.   2. Mildly decreased global left ventricular systolic function.   3. Left ventricular ejection fraction, by visual estimation, is 45 to 50%.   4. Severely enlarged left atrium.   5. Abnormal septal motion consistent with post-operative status.   6. Elevated mean left atrial pressure.   7. Right atrial enlargement.   8. No left atrial appendage thrombus.   9. Moderately reduced RV systolic function.  10. There is a well seated mitral valve prosthesis, moderate para-valvular leak. Mean transmitral gradient of 4.3 mmHg at Hr of 62 BPM.  11. Mild tricuspid regurgitation.  12. Aortic valve is normally functioning bioprosthetic valve.  13. Synthetic graft isseen in the ascending aorta. There is a known type B dissection with a large and thrombosed false lumen and a smaller true lumen.  14. Trivial pericardial effusion.  MD Gennaro Electronically signed on 9/14/2021 at 1:01:40 PM  < end of copied text >         A/P: 62 y/o male with extensive PMHx of psoriasis, Cocaine abuse (quit 2010), Aortic dissection s/p emergent repair (2010), surgery complicated by CVA w/ residual left sided weakness and bowel ischemia s/p bowel resection and ostomy with reversal repair, Covid infection, ESRD on HD (Tu/Thur/Sat) s/p LUE AVF, HTN, seizure disorder, GIB s/p colonoscopy and AVMs cauterization 3/2021 and CAD s/p remote stent to LAD and recent CABG x 2 (DGV-OM, RPDA with Dr. Butler 11/2020), AI and MR s/p tAVR, tMVR (11/2020 Dr. Butler) with post-op pAfib/flutter (on eliquis), HFrEF (EF 45-50% 02/21) s/p DCCV 6/2021. He presented electively on 9/14/2021 for and is now POD #1 status post uncomplicated atrial fibrillation and atrial flutter ablation (WACA/PVI, CTI and mitral flutter) via b/l FV. B/L groin sutures removed without incident. Pt feels well and is stable for discharge home.     Plan:   Continue Eliquis 5mg po BID, importance of uninterrupted a/c reviewed with pt   Lasix 20mg PO daily x 3 days.   No amiodarone at this time secondary to ectopic atrial rhythm on ECG and sinus pauses during procedure  Decrease Metoprolol to 25mg daily. Continue metoprolol and losartan for HFrEF   Continue other home medications.   Start Protonix 40mg twice daily x 2 weeks, then once daily x 6 weeks.   Carafate 1gm BID x 2 weeks.   Groin care, restrictions, medication changes and outpt f/u reviewed with pt, all questions answered  Pt has scheduled outpt HD tomorrow 9/16/21    DW Dr Trevino, agrees with above

## 2021-09-15 NOTE — DISCHARGE NOTE NURSING/CASE MANAGEMENT/SOCIAL WORK - PATIENT PORTAL LINK FT
You can access the FollowMyHealth Patient Portal offered by [facility] by registering at the following website: [email or web address]. By joining [employer]’s FollowMyHealth portal, you will also be able to view your health information using other applications (apps) compatible with our system.

## 2021-09-16 ENCOUNTER — APPOINTMENT (OUTPATIENT)
Dept: VASCULAR SURGERY | Facility: CLINIC | Age: 61
End: 2021-09-16
Payer: MEDICARE

## 2021-09-16 VITALS
DIASTOLIC BLOOD PRESSURE: 67 MMHG | HEART RATE: 82 BPM | SYSTOLIC BLOOD PRESSURE: 137 MMHG | BODY MASS INDEX: 29.51 KG/M2 | TEMPERATURE: 97.3 F | WEIGHT: 188 LBS | OXYGEN SATURATION: 94 % | HEIGHT: 67 IN

## 2021-09-16 PROBLEM — K92.2 GASTROINTESTINAL HEMORRHAGE, UNSPECIFIED: Chronic | Status: ACTIVE | Noted: 2021-09-14

## 2021-09-16 PROBLEM — I25.10 ATHEROSCLEROTIC HEART DISEASE OF NATIVE CORONARY ARTERY WITHOUT ANGINA PECTORIS: Chronic | Status: ACTIVE | Noted: 2021-09-14

## 2021-09-16 PROCEDURE — 36589 REMOVAL TUNNELED CV CATH: CPT

## 2021-09-17 NOTE — PATIENT PROFILE ADULT - NSPROPOAURINARYCATHETER_GEN_A_NUR
Hb 6.3  - from port ,  IV meds given to it    Hb 7.3 yesterday     repeat H/H peripheral stick .   Patient ESRD/ HD yesterday . No overt bleed.    no

## 2021-09-19 NOTE — PHYSICAL EXAM
[JVD] : no jugular venous distention  [Normal Breath Sounds] : Normal breath sounds [2+] : left 2+ [Ankle Swelling (On Exam)] : not present [Varicose Veins Of Lower Extremities] : not present [] : not present [Abdomen Tenderness] : ~T ~M No abdominal tenderness [Abdomen Masses] : No abdominal masses [No Rash or Lesion] : No rash or lesion [Alert] : alert [Oriented to Person] : oriented to person [Oriented to Place] : oriented to place [Oriented to Time] : oriented to time [Calm] : calm [de-identified] : Well appearing [FreeTextEntry1] : Patent AV graft with good thrill\par No arm swelling\par Pedal pulses strongly palpable bilaterally

## 2021-09-19 NOTE — HISTORY OF PRESENT ILLNESS
[FreeTextEntry1] : 61 year old man with history of Type A aortic dissection s/p repair in 2010 and recent CABG x2, Mitral valve and aortic valve replacement in November 2020. Patient has been on HD via a right IJ tunneled HD catheter since November 2020. Patient referred here for long term AV access.\par Patient is right hand dominant. he had a previous stroke and has some residual left arm weakness\par Patient s/p left forearm loop AV graft on 1/2/21 [de-identified] : Patient was admitted to [facility] 1 month ago with acute graft thrombosis.\par he underwent graft thrombectomy and placement of a tunneled HD catheter\par He was seen at [facility] 1 week later and reportedly had a stent placed at the venous anastomosis.\par No issues with HD through the graft

## 2021-09-19 NOTE — ASSESSMENT
[FreeTextEntry1] : 71 year old man with ESRD s/p left arm AV graft in 01/2021\par -Patient was admitted to [facility] 1 month ago with acute graft thrombosis.\par he underwent graft thrombectomy and placement of a tunneled HD catheter\par He was seen at [facility] 1 week later and reportedly had a stent placed at the venous anastomosis.\par No issues with HD through the graft\par \par Informed consent was obtained\par The right chest wall was prepped and draped in a sterile fashion.\par Local anesthesia was infiltrated at the catheter entry site.\par Using blunt dissection, the catheter cuff was dissected freely, the catheter was removed without resistance. The catheter was inspected and confirmed to be removed in its entirety.\par Hemostasis was achieved using manual pressure.\par Procedure was well tolerated.\par EBL 2cc\par \par -Continue to use AVG for HD\par -Return in 3 months\par \par

## 2021-10-09 ENCOUNTER — RESULT CHARGE (OUTPATIENT)
Age: 61
End: 2021-10-09

## 2021-10-11 ENCOUNTER — NON-APPOINTMENT (OUTPATIENT)
Age: 61
End: 2021-10-11

## 2021-10-11 ENCOUNTER — APPOINTMENT (OUTPATIENT)
Dept: ELECTROPHYSIOLOGY | Facility: CLINIC | Age: 61
End: 2021-10-11
Payer: MEDICARE

## 2021-10-11 VITALS
OXYGEN SATURATION: 100 % | BODY MASS INDEX: 29.1 KG/M2 | HEART RATE: 85 BPM | TEMPERATURE: 98.5 F | HEIGHT: 68 IN | DIASTOLIC BLOOD PRESSURE: 68 MMHG | SYSTOLIC BLOOD PRESSURE: 147 MMHG | WEIGHT: 192 LBS

## 2021-10-11 PROCEDURE — 93000 ELECTROCARDIOGRAM COMPLETE: CPT

## 2021-10-11 PROCEDURE — 99215 OFFICE O/P EST HI 40 MIN: CPT

## 2021-10-11 RX ORDER — AMIODARONE HYDROCHLORIDE 200 MG/1
200 TABLET ORAL DAILY
Qty: 90 | Refills: 3 | Status: DISCONTINUED | COMMUNITY
Start: 2021-07-08 | End: 2021-10-11

## 2021-10-15 NOTE — PHYSICAL EXAM
[Well Developed] : well developed [No Acute Distress] : no acute distress [Normal S1, S2] : normal S1, S2 [Clear Lung Fields] : clear lung fields [No Respiratory Distress] : no respiratory distress  [Soft] : abdomen soft [Normal Gait] : normal gait [No Edema] : no edema [Moves all extremities] : moves all extremities [Alert and Oriented] : alert and oriented [de-identified] : 3/6 systolic [de-identified] : b/l groins without swelling bleeding or hematoma

## 2021-10-15 NOTE — ASSESSMENT
[FreeTextEntry1] : 60 y/o male with pmhx psoriasis, Cocaine abuse (quit 2010), Aortic dissection s/p emergent repair (2010), surgery complicated by CVA w/ residual left sided weakness and bowel ischemia s/p bowel resection and ostomy with reversal repair, Covid infection, ESRD on HD (Tu/Thur/Sat) s/p LUE AVF, HTN, seizure disorder, GIB s/p colonoscopy and AVMs cauterization 3/2021 and CAD s/p remote stent to LAD and recent CABG x 2 (DGV-OM, RPDA with Dr. Butler 11/2020), AI and MR s/p tAVR, tMVR (11/2020 Dr. Butler) with post-op pAfib/flutter (on eliquis), HFrEF (EF 45-50% 02/21) s/p DCCV 6/2021.\par Now s/p uncomplicated atrial fibrillation and atrial flutter ablation on 9/14/2021 with Dr. Trevino (WACA/PVI, CTI and mitral flutter).  Amiodarone was discontinued post procedure due to ectopic atrial rhythm on ECG and sinus pauses during procedure.\par \par Presents for post ablation follow-up with Dr. Virk who initially followed patient. Overall doing well and denies CP, SOB, ZUNIGA, dizziness, palpitations, syncope or presyncope.  Previously experience intermittent palpitations which had become increasingly bothersome.  Symptoms have now resolved. \par \par Ongoing complaints of leg cramps at night. Can also occur during dialysis.

## 2021-10-15 NOTE — DISCUSSION/SUMMARY
[FreeTextEntry1] : 1) PAFib and Flutter with RVR w/ associated CHF exacerbation: \par - Now s/p AF ablation with Dr. Trevino (in Dr. Newsome absence). Requesting continued f/up with Dr. Virk.\par - Notable symptomatic improvement since ablation with resolved palpitations. \par - EKG w/ suspected ectopic atrial rhythm but no clear AT/AF.  Plan for one week MCOT to confirm.\par - Now off Amiodarone.  Continue Metoprolol 50mg.\par - [id number] 4, continue a/c\par - Complete GI prophylaxis with Pantoprazole (finished Carafate)\par - One week MCOT at > 3 months post ablation prior to EP f/up\par \par 2) Multifocal PVCS: \par - PVC burden 8% on MCOT 7/19-7/25\par - Stopped Amiodarone 4 weeks ago. Repeat monitor today and again at > 3 months post ablation\par - May consider further management (ie ablation) if frequent PVCS that could be contributing to CM.\par \par 3) H/o GIB s/p colonoscopy and AVMs cauterization 3/2021: No recurrent bleeding issues currently tolerating a/c. Recommend GI f/up. Consider possible LAAO with WATCHMAN if recurrent bleeding issues develop. \par \par 4) HFrEF: MUSTAPHA 9/14/15 with improved LV function 45-50%. On GDMT for CHF with BB and ARB. NO history of near or true syncope. History of CAD but no indication for EPS to screen for inducible arrhythmia given LV function > 40%. Consider repeat TTE at > 3months post ablation.\par \par 5) CAD- s/p CABG: On ASA and statin. \par \par 6) Ascending aortic root aneurysm: CTSx reviewed Type B aortic dissection appears stable since Nov 2020. 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 No contrast extravasation or hematoma. \par - No further surgical interventions required\par \par 7) H/o CKD on HD:  Per patient, no longer being considered for transplant. \par - Ongoing complaints of leg cramps at night. Can also occur during dialysis. \par - Lytes monitored weekly at HD and recently unremarkable\par - Advised to f/up with renal and HD\par - Following with Dr. Reyes\par \par Seen and d/w Dr. Virk. \par Rivka VO\par

## 2021-10-15 NOTE — END OF VISIT
[Time Spent: ___ minutes] : I have spent [unfilled] minutes of time on the encounter. [FreeTextEntry3] : Seen and examined, agree with above \par

## 2021-10-15 NOTE — REVIEW OF SYSTEMS
[Muscle Cramps] : muscle cramps [Dizziness] : dizziness [Fever] : no fever [Chills] : no chills [Feeling Fatigued] : not feeling fatigued [SOB] : no shortness of breath [Dyspnea on exertion] : not dyspnea during exertion [Chest Discomfort] : no chest discomfort [Lower Ext Edema] : no extremity edema [Leg Claudication] : no intermittent leg claudication [Palpitations] : no palpitations [Orthopnea] : no orthopnea [Syncope] : no syncope [Cough] : no cough [Easy Bleeding] : no tendency for easy bleeding

## 2021-10-20 ENCOUNTER — APPOINTMENT (OUTPATIENT)
Dept: CARDIOLOGY | Facility: CLINIC | Age: 61
End: 2021-10-20

## 2021-10-20 ENCOUNTER — APPOINTMENT (OUTPATIENT)
Dept: CT IMAGING | Facility: IMAGING CENTER | Age: 61
End: 2021-10-20

## 2021-10-22 ENCOUNTER — INPATIENT (INPATIENT)
Facility: HOSPITAL | Age: 61
LOS: 18 days | Discharge: OTHER INSTITUTION | DRG: 329 | End: 2021-11-10
Attending: STUDENT IN AN ORGANIZED HEALTH CARE EDUCATION/TRAINING PROGRAM | Admitting: STUDENT IN AN ORGANIZED HEALTH CARE EDUCATION/TRAINING PROGRAM
Payer: MEDICARE

## 2021-10-22 VITALS
HEART RATE: 98 BPM | HEIGHT: 68 IN | SYSTOLIC BLOOD PRESSURE: 151 MMHG | WEIGHT: 184.97 LBS | RESPIRATION RATE: 18 BRPM | DIASTOLIC BLOOD PRESSURE: 82 MMHG | TEMPERATURE: 99 F | OXYGEN SATURATION: 96 %

## 2021-10-22 DIAGNOSIS — Z95.1 PRESENCE OF AORTOCORONARY BYPASS GRAFT: Chronic | ICD-10-CM

## 2021-10-22 DIAGNOSIS — I77.0 ARTERIOVENOUS FISTULA, ACQUIRED: Chronic | ICD-10-CM

## 2021-10-22 DIAGNOSIS — Z95.2 PRESENCE OF PROSTHETIC HEART VALVE: Chronic | ICD-10-CM

## 2021-10-22 DIAGNOSIS — Z86.79 PERSONAL HISTORY OF OTHER DISEASES OF THE CIRCULATORY SYSTEM: Chronic | ICD-10-CM

## 2021-10-22 DIAGNOSIS — Z90.49 ACQUIRED ABSENCE OF OTHER SPECIFIED PARTS OF DIGESTIVE TRACT: Chronic | ICD-10-CM

## 2021-10-22 LAB
ALBUMIN SERPL ELPH-MCNC: 4.9 G/DL — SIGNIFICANT CHANGE UP (ref 3.3–5.2)
ALP SERPL-CCNC: 120 U/L — SIGNIFICANT CHANGE UP (ref 40–120)
ALT FLD-CCNC: 19 U/L — SIGNIFICANT CHANGE UP
ANION GAP SERPL CALC-SCNC: 18 MMOL/L — HIGH (ref 5–17)
APTT BLD: 41 SEC — HIGH (ref 27.5–35.5)
AST SERPL-CCNC: 25 U/L — SIGNIFICANT CHANGE UP
BASOPHILS # BLD AUTO: 0.04 K/UL — SIGNIFICANT CHANGE UP (ref 0–0.2)
BASOPHILS NFR BLD AUTO: 0.4 % — SIGNIFICANT CHANGE UP (ref 0–2)
BILIRUB SERPL-MCNC: 1.1 MG/DL — SIGNIFICANT CHANGE UP (ref 0.4–2)
BUN SERPL-MCNC: 42.9 MG/DL — HIGH (ref 8–20)
CALCIUM SERPL-MCNC: 10.1 MG/DL — SIGNIFICANT CHANGE UP (ref 8.6–10.2)
CHLORIDE SERPL-SCNC: 93 MMOL/L — LOW (ref 98–107)
CO2 SERPL-SCNC: 29 MMOL/L — SIGNIFICANT CHANGE UP (ref 22–29)
CREAT SERPL-MCNC: 2.96 MG/DL — HIGH (ref 0.5–1.3)
EOSINOPHIL # BLD AUTO: 0.02 K/UL — SIGNIFICANT CHANGE UP (ref 0–0.5)
EOSINOPHIL NFR BLD AUTO: 0.2 % — SIGNIFICANT CHANGE UP (ref 0–6)
GLUCOSE SERPL-MCNC: 114 MG/DL — HIGH (ref 70–99)
HCT VFR BLD CALC: 40.7 % — SIGNIFICANT CHANGE UP (ref 39–50)
HGB BLD-MCNC: 13.4 G/DL — SIGNIFICANT CHANGE UP (ref 13–17)
IMM GRANULOCYTES NFR BLD AUTO: 0.4 % — SIGNIFICANT CHANGE UP (ref 0–1.5)
INR BLD: 1.88 RATIO — HIGH (ref 0.88–1.16)
LIDOCAIN IGE QN: 38 U/L — SIGNIFICANT CHANGE UP (ref 22–51)
LYMPHOCYTES # BLD AUTO: 1.62 K/UL — SIGNIFICANT CHANGE UP (ref 1–3.3)
LYMPHOCYTES # BLD AUTO: 15.4 % — SIGNIFICANT CHANGE UP (ref 13–44)
MAGNESIUM SERPL-MCNC: 1.9 MG/DL — SIGNIFICANT CHANGE UP (ref 1.6–2.6)
MCHC RBC-ENTMCNC: 29.4 PG — SIGNIFICANT CHANGE UP (ref 27–34)
MCHC RBC-ENTMCNC: 32.9 GM/DL — SIGNIFICANT CHANGE UP (ref 32–36)
MCV RBC AUTO: 89.3 FL — SIGNIFICANT CHANGE UP (ref 80–100)
MONOCYTES # BLD AUTO: 1.05 K/UL — HIGH (ref 0–0.9)
MONOCYTES NFR BLD AUTO: 10 % — SIGNIFICANT CHANGE UP (ref 2–14)
NEUTROPHILS # BLD AUTO: 7.75 K/UL — HIGH (ref 1.8–7.4)
NEUTROPHILS NFR BLD AUTO: 73.6 % — SIGNIFICANT CHANGE UP (ref 43–77)
PLATELET # BLD AUTO: 207 K/UL — SIGNIFICANT CHANGE UP (ref 150–400)
POTASSIUM SERPL-MCNC: 3.5 MMOL/L — SIGNIFICANT CHANGE UP (ref 3.5–5.3)
POTASSIUM SERPL-SCNC: 3.5 MMOL/L — SIGNIFICANT CHANGE UP (ref 3.5–5.3)
PROT SERPL-MCNC: 8.7 G/DL — SIGNIFICANT CHANGE UP (ref 6.6–8.7)
PROTHROM AB SERPL-ACNC: 21.2 SEC — HIGH (ref 10.6–13.6)
RBC # BLD: 4.56 M/UL — SIGNIFICANT CHANGE UP (ref 4.2–5.8)
RBC # FLD: 13.9 % — SIGNIFICANT CHANGE UP (ref 10.3–14.5)
SODIUM SERPL-SCNC: 140 MMOL/L — SIGNIFICANT CHANGE UP (ref 135–145)
WBC # BLD: 10.52 K/UL — HIGH (ref 3.8–10.5)
WBC # FLD AUTO: 10.52 K/UL — HIGH (ref 3.8–10.5)

## 2021-10-22 PROCEDURE — 71045 X-RAY EXAM CHEST 1 VIEW: CPT | Mod: 26

## 2021-10-22 PROCEDURE — 99285 EMERGENCY DEPT VISIT HI MDM: CPT

## 2021-10-22 RX ORDER — ONDANSETRON 8 MG/1
4 TABLET, FILM COATED ORAL ONCE
Refills: 0 | Status: COMPLETED | OUTPATIENT
Start: 2021-10-22 | End: 2021-10-22

## 2021-10-22 RX ORDER — SODIUM CHLORIDE 9 MG/ML
1000 INJECTION INTRAMUSCULAR; INTRAVENOUS; SUBCUTANEOUS ONCE
Refills: 0 | Status: DISCONTINUED | OUTPATIENT
Start: 2021-10-22 | End: 2021-10-22

## 2021-10-22 RX ORDER — SODIUM CHLORIDE 9 MG/ML
500 INJECTION INTRAMUSCULAR; INTRAVENOUS; SUBCUTANEOUS ONCE
Refills: 0 | Status: COMPLETED | OUTPATIENT
Start: 2021-10-22 | End: 2021-10-22

## 2021-10-22 RX ORDER — IOHEXOL 300 MG/ML
30 INJECTION, SOLUTION INTRAVENOUS ONCE
Refills: 0 | Status: COMPLETED | OUTPATIENT
Start: 2021-10-22 | End: 2021-10-22

## 2021-10-22 RX ORDER — MORPHINE SULFATE 50 MG/1
2 CAPSULE, EXTENDED RELEASE ORAL ONCE
Refills: 0 | Status: DISCONTINUED | OUTPATIENT
Start: 2021-10-22 | End: 2021-10-22

## 2021-10-22 RX ADMIN — ONDANSETRON 4 MILLIGRAM(S): 8 TABLET, FILM COATED ORAL at 22:47

## 2021-10-22 RX ADMIN — SODIUM CHLORIDE 500 MILLILITER(S): 9 INJECTION INTRAMUSCULAR; INTRAVENOUS; SUBCUTANEOUS at 22:47

## 2021-10-22 RX ADMIN — IOHEXOL 30 MILLILITER(S): 300 INJECTION, SOLUTION INTRAVENOUS at 22:47

## 2021-10-22 RX ADMIN — MORPHINE SULFATE 2 MILLIGRAM(S): 50 CAPSULE, EXTENDED RELEASE ORAL at 22:47

## 2021-10-22 NOTE — ED ADULT TRIAGE NOTE - CHIEF COMPLAINT QUOTE
Refill approved as requested.  
no BM for 2 days, with vomiting for 2 days. unable to take medication. has hx of bowel obstruction

## 2021-10-22 NOTE — ED PROVIDER NOTE - ATTENDING CONTRIBUTION TO CARE
s/p abd surgeries now with abd pain, constipation, nausea and nbnb emesis  pt with SBO, admit for further managment

## 2021-10-22 NOTE — ED PROVIDER NOTE - PROGRESS NOTE DETAILS
surgery consult JK - CT abdomen positive for SBO. Patient reports improvement of his n/v and abdominal pain with morphine and Zofran. NGT placed. VSS, patient comfortable. Admitted to surgery.

## 2021-10-22 NOTE — ED PROVIDER NOTE - OBJECTIVE STATEMENT
Patient is a 60 yo male with PMHx aortic dissecttion s/p emergent repair (2010), CVA w/ residual left sided weakness and bowel ischemia s/p bowel resection and ostomy with reversal repair, ESRD on HD, CAD s/p CABG x 2, AI and MR s/p tAVR, a fib on eliquis presenting with 3 days of constipation, diffuse abdominal pain, and multiple episodes of n/v. Patient reports he has not had a bowel movement since Wednesday, with subsequent 6/10 dull intermittent abdominal pain. Patient is a 60 yo male with PMHx aortic dissecttion s/p emergent repair (2010), CVA w/ residual left sided weakness and bowel ischemia s/p bowel resection and ostomy with reversal repair, ESRD on HD, CAD s/p CABG x 2, AI and MR s/p tAVR, a fib on eliquis presenting with 3 days of constipation, diffuse abdominal pain, and multiple episodes of n/v. Patient reports he has not had a bowel movement since Wednesday, with subsequent 6/10 dull intermittent abdominal pain. Last night he started to feel nauseous and had multiple episodes of nonbilious nonbloody vomiting with gastric contents and brown material. His last BM on Tuesday was of normal consistency. He denies dizziness, lightheadedness, syncope, chest pain, SOB, diarrhea, dysuria, hematuria, hematochezia, hematemesis, melena, recent travel, sick contacts, leg swelling.

## 2021-10-22 NOTE — ED PROVIDER NOTE - PHYSICAL EXAMINATION
Gen: no acute distress  Head: normocephalic, atraumatic  Lung: CTAB, no respiratory distress, no wheezing, rales, rhonchi  CV: normal s1/s2, rrr,   Abd: soft, non-distended. Mildly tender to palpation in all 4 quadrants, no rebound guarding or peritoneal signs  : No cva tenderness   MSK: No edema, no visible deformities, full range of motion in all 4 extremities  Neuro: No focal neurologic deficits  Skin: No rash   Psych: normal affect

## 2021-10-22 NOTE — ED PROVIDER NOTE - NS ED MD DISPO ADMITTING SERVICE
PICC Insertion Final 3CG    Consents confirmed, vessel patency confirmed with ultrasound. Risks and benefits of procedure explained to patient/family and education regarding central line associated bloodstream infections provided. Questions answered.     PICC placed in RUE per MD order with ultrasound guidance. 5 Fr, DOUBLE lumen PICC placed in BASILIC vein after ONE attempt(s). 1 cc's of 1% lidocaine injected intradermally, 21 gauge microintroducer needle and modified Seldinger technique used. 42 cm catheter inserted with good blood return. Secured at 3cm marker. Each lumen flushed without resistance with 10 mL 0.9% normal saline. PICC line secured with Biopatch and Tegaderm.    PICC placement is confirmed by nurse using 3CG technology. PICC line is appropriate for use at this time. Pt tolerated procedure WELL, 0 pain, 0-10 scale.  Patient condition relayed to unit RN or ordering physician via this post procedure note in the EMR.     BARD Power PICC ref # [id number], Lot # [id number]    All guidewires removed.  RUE circumference 9 cm proximal to ac:  37 cm        
SURG

## 2021-10-22 NOTE — ED PROVIDER NOTE - CLINICAL SUMMARY MEDICAL DECISION MAKING FREE TEXT BOX
Patient is a 62 yo male with PMHx aortic dissecttion s/p emergent repair (2010), CVA w/ residual left sided weakness and bowel ischemia s/p bowel resection and ostomy with reversal repair, ESRD on HD, CAD s/p CABG x 2, AI and MR s/p tAVR, a fib on eliquis presenting with 3 days of constipation, diffuse abdominal pain, and multiple episodes of n/v. Patient reports he has not had a bowel movement since Wednesday, with subsequent 6/10 dull intermittent abdominal pain and multiple episodes of vomiting. IVF, Zofran, Morphine for reduced PO intake, n/v, and abdominal pain. CBC, CMP, Mg to evaluate for electrolyte abnormalities. CT AP w/ oral contrast to r/o SBO and intraabdominal pathology. ECG to r/o ACS. Will reassess.

## 2021-10-23 DIAGNOSIS — R10.9 UNSPECIFIED ABDOMINAL PAIN: ICD-10-CM

## 2021-10-23 LAB
ANION GAP SERPL CALC-SCNC: 21 MMOL/L — HIGH (ref 5–17)
APTT BLD: 34.9 SEC — SIGNIFICANT CHANGE UP (ref 27.5–35.5)
BASOPHILS # BLD AUTO: 0.05 K/UL — SIGNIFICANT CHANGE UP (ref 0–0.2)
BASOPHILS NFR BLD AUTO: 0.5 % — SIGNIFICANT CHANGE UP (ref 0–2)
BUN SERPL-MCNC: 54.2 MG/DL — HIGH (ref 8–20)
CALCIUM SERPL-MCNC: 9.7 MG/DL — SIGNIFICANT CHANGE UP (ref 8.6–10.2)
CHLORIDE SERPL-SCNC: 90 MMOL/L — LOW (ref 98–107)
CO2 SERPL-SCNC: 29 MMOL/L — SIGNIFICANT CHANGE UP (ref 22–29)
CREAT SERPL-MCNC: 3.37 MG/DL — HIGH (ref 0.5–1.3)
EOSINOPHIL # BLD AUTO: 0.15 K/UL — SIGNIFICANT CHANGE UP (ref 0–0.5)
EOSINOPHIL NFR BLD AUTO: 1.6 % — SIGNIFICANT CHANGE UP (ref 0–6)
GLUCOSE SERPL-MCNC: 118 MG/DL — HIGH (ref 70–99)
HCT VFR BLD CALC: 40.4 % — SIGNIFICANT CHANGE UP (ref 39–50)
HCT VFR BLD CALC: 41.4 % — SIGNIFICANT CHANGE UP (ref 39–50)
HGB BLD-MCNC: 13.3 G/DL — SIGNIFICANT CHANGE UP (ref 13–17)
HGB BLD-MCNC: 13.8 G/DL — SIGNIFICANT CHANGE UP (ref 13–17)
IMM GRANULOCYTES NFR BLD AUTO: 0.2 % — SIGNIFICANT CHANGE UP (ref 0–1.5)
INR BLD: 1.66 RATIO — HIGH (ref 0.88–1.16)
LACTATE SERPL-SCNC: 1.4 MMOL/L — SIGNIFICANT CHANGE UP (ref 0.5–2)
LYMPHOCYTES # BLD AUTO: 1.11 K/UL — SIGNIFICANT CHANGE UP (ref 1–3.3)
LYMPHOCYTES # BLD AUTO: 12.1 % — LOW (ref 13–44)
MAGNESIUM SERPL-MCNC: 2 MG/DL — SIGNIFICANT CHANGE UP (ref 1.6–2.6)
MCHC RBC-ENTMCNC: 29.9 PG — SIGNIFICANT CHANGE UP (ref 27–34)
MCHC RBC-ENTMCNC: 30.1 PG — SIGNIFICANT CHANGE UP (ref 27–34)
MCHC RBC-ENTMCNC: 32.9 GM/DL — SIGNIFICANT CHANGE UP (ref 32–36)
MCHC RBC-ENTMCNC: 33.3 GM/DL — SIGNIFICANT CHANGE UP (ref 32–36)
MCV RBC AUTO: 90.4 FL — SIGNIFICANT CHANGE UP (ref 80–100)
MCV RBC AUTO: 90.8 FL — SIGNIFICANT CHANGE UP (ref 80–100)
MONOCYTES # BLD AUTO: 1.14 K/UL — HIGH (ref 0–0.9)
MONOCYTES NFR BLD AUTO: 12.4 % — SIGNIFICANT CHANGE UP (ref 2–14)
NEUTROPHILS # BLD AUTO: 6.73 K/UL — SIGNIFICANT CHANGE UP (ref 1.8–7.4)
NEUTROPHILS NFR BLD AUTO: 73.2 % — SIGNIFICANT CHANGE UP (ref 43–77)
PHOSPHATE SERPL-MCNC: 5.4 MG/DL — HIGH (ref 2.4–4.7)
PLATELET # BLD AUTO: 219 K/UL — SIGNIFICANT CHANGE UP (ref 150–400)
PLATELET # BLD AUTO: 224 K/UL — SIGNIFICANT CHANGE UP (ref 150–400)
POTASSIUM SERPL-MCNC: 3.1 MMOL/L — LOW (ref 3.5–5.3)
POTASSIUM SERPL-SCNC: 3.1 MMOL/L — LOW (ref 3.5–5.3)
PROTHROM AB SERPL-ACNC: 18.8 SEC — HIGH (ref 10.6–13.6)
RAPID RVP RESULT: SIGNIFICANT CHANGE UP
RBC # BLD: 4.45 M/UL — SIGNIFICANT CHANGE UP (ref 4.2–5.8)
RBC # BLD: 4.58 M/UL — SIGNIFICANT CHANGE UP (ref 4.2–5.8)
RBC # FLD: 14 % — SIGNIFICANT CHANGE UP (ref 10.3–14.5)
RBC # FLD: 14 % — SIGNIFICANT CHANGE UP (ref 10.3–14.5)
SARS-COV-2 RNA SPEC QL NAA+PROBE: SIGNIFICANT CHANGE UP
SODIUM SERPL-SCNC: 140 MMOL/L — SIGNIFICANT CHANGE UP (ref 135–145)
WBC # BLD: 6.95 K/UL — SIGNIFICANT CHANGE UP (ref 3.8–10.5)
WBC # BLD: 9.2 K/UL — SIGNIFICANT CHANGE UP (ref 3.8–10.5)
WBC # FLD AUTO: 6.95 K/UL — SIGNIFICANT CHANGE UP (ref 3.8–10.5)
WBC # FLD AUTO: 9.2 K/UL — SIGNIFICANT CHANGE UP (ref 3.8–10.5)

## 2021-10-23 PROCEDURE — 74176 CT ABD & PELVIS W/O CONTRAST: CPT | Mod: 26,MA

## 2021-10-23 PROCEDURE — 99222 1ST HOSP IP/OBS MODERATE 55: CPT

## 2021-10-23 PROCEDURE — 71045 X-RAY EXAM CHEST 1 VIEW: CPT | Mod: 26

## 2021-10-23 PROCEDURE — 93010 ELECTROCARDIOGRAM REPORT: CPT

## 2021-10-23 RX ORDER — HYDROMORPHONE HYDROCHLORIDE 2 MG/ML
0.5 INJECTION INTRAMUSCULAR; INTRAVENOUS; SUBCUTANEOUS ONCE
Refills: 0 | Status: DISCONTINUED | OUTPATIENT
Start: 2021-10-23 | End: 2021-10-23

## 2021-10-23 RX ORDER — ONDANSETRON 8 MG/1
4 TABLET, FILM COATED ORAL ONCE
Refills: 0 | Status: COMPLETED | OUTPATIENT
Start: 2021-10-23 | End: 2021-10-23

## 2021-10-23 RX ORDER — POTASSIUM CHLORIDE 20 MEQ
10 PACKET (EA) ORAL
Refills: 0 | Status: COMPLETED | OUTPATIENT
Start: 2021-10-23 | End: 2021-10-23

## 2021-10-23 RX ORDER — LIDOCAINE HCL 20 MG/ML
10 VIAL (ML) INJECTION ONCE
Refills: 0 | Status: COMPLETED | OUTPATIENT
Start: 2021-10-23 | End: 2021-10-23

## 2021-10-23 RX ORDER — SODIUM CHLORIDE 9 MG/ML
1000 INJECTION, SOLUTION INTRAVENOUS
Refills: 0 | Status: DISCONTINUED | OUTPATIENT
Start: 2021-10-23 | End: 2021-10-26

## 2021-10-23 RX ORDER — ENOXAPARIN SODIUM 100 MG/ML
40 INJECTION SUBCUTANEOUS EVERY 12 HOURS
Refills: 0 | Status: DISCONTINUED | OUTPATIENT
Start: 2021-10-23 | End: 2021-10-23

## 2021-10-23 RX ORDER — ACETAMINOPHEN 500 MG
975 TABLET ORAL EVERY 6 HOURS
Refills: 0 | Status: DISCONTINUED | OUTPATIENT
Start: 2021-10-23 | End: 2021-10-23

## 2021-10-23 RX ORDER — LOSARTAN POTASSIUM 100 MG/1
25 TABLET, FILM COATED ORAL DAILY
Refills: 0 | Status: DISCONTINUED | OUTPATIENT
Start: 2021-10-23 | End: 2021-10-23

## 2021-10-23 RX ORDER — ACETAMINOPHEN 500 MG
1000 TABLET ORAL EVERY 6 HOURS
Refills: 0 | Status: COMPLETED | OUTPATIENT
Start: 2021-10-23 | End: 2021-10-24

## 2021-10-23 RX ORDER — ENOXAPARIN SODIUM 100 MG/ML
80 INJECTION SUBCUTANEOUS
Refills: 0 | Status: DISCONTINUED | OUTPATIENT
Start: 2021-10-23 | End: 2021-10-23

## 2021-10-23 RX ORDER — ASPIRIN/CALCIUM CARB/MAGNESIUM 324 MG
81 TABLET ORAL DAILY
Refills: 0 | Status: DISCONTINUED | OUTPATIENT
Start: 2021-10-23 | End: 2021-10-23

## 2021-10-23 RX ORDER — PANTOPRAZOLE SODIUM 20 MG/1
40 TABLET, DELAYED RELEASE ORAL DAILY
Refills: 0 | Status: DISCONTINUED | OUTPATIENT
Start: 2021-10-23 | End: 2021-10-26

## 2021-10-23 RX ORDER — HEPARIN SODIUM 5000 [USP'U]/ML
5000 INJECTION INTRAVENOUS; SUBCUTANEOUS EVERY 8 HOURS
Refills: 0 | Status: DISCONTINUED | OUTPATIENT
Start: 2021-10-23 | End: 2021-10-23

## 2021-10-23 RX ORDER — HEPARIN SODIUM 5000 [USP'U]/ML
INJECTION INTRAVENOUS; SUBCUTANEOUS
Qty: 25000 | Refills: 0 | Status: DISCONTINUED | OUTPATIENT
Start: 2021-10-23 | End: 2021-10-24

## 2021-10-23 RX ORDER — METOPROLOL TARTRATE 50 MG
5 TABLET ORAL EVERY 6 HOURS
Refills: 0 | Status: DISCONTINUED | OUTPATIENT
Start: 2021-10-23 | End: 2021-10-26

## 2021-10-23 RX ORDER — SODIUM CHLORIDE 9 MG/ML
2000 INJECTION, SOLUTION INTRAVENOUS ONCE
Refills: 0 | Status: DISCONTINUED | OUTPATIENT
Start: 2021-10-23 | End: 2021-10-23

## 2021-10-23 RX ORDER — SODIUM CHLORIDE 9 MG/ML
1000 INJECTION, SOLUTION INTRAVENOUS ONCE
Refills: 0 | Status: COMPLETED | OUTPATIENT
Start: 2021-10-23 | End: 2021-10-23

## 2021-10-23 RX ADMIN — Medication 5 MILLIGRAM(S): at 05:51

## 2021-10-23 RX ADMIN — Medication 100 MILLIEQUIVALENT(S): at 15:10

## 2021-10-23 RX ADMIN — SODIUM CHLORIDE 115 MILLILITER(S): 9 INJECTION, SOLUTION INTRAVENOUS at 05:20

## 2021-10-23 RX ADMIN — MORPHINE SULFATE 2 MILLIGRAM(S): 50 CAPSULE, EXTENDED RELEASE ORAL at 04:04

## 2021-10-23 RX ADMIN — PANTOPRAZOLE SODIUM 40 MILLIGRAM(S): 20 TABLET, DELAYED RELEASE ORAL at 11:19

## 2021-10-23 RX ADMIN — Medication 81 MILLIGRAM(S): at 11:18

## 2021-10-23 RX ADMIN — Medication 100 MILLIEQUIVALENT(S): at 16:33

## 2021-10-23 RX ADMIN — HYDROMORPHONE HYDROCHLORIDE 0.5 MILLIGRAM(S): 2 INJECTION INTRAMUSCULAR; INTRAVENOUS; SUBCUTANEOUS at 13:01

## 2021-10-23 RX ADMIN — HEPARIN SODIUM 5000 UNIT(S): 5000 INJECTION INTRAVENOUS; SUBCUTANEOUS at 14:00

## 2021-10-23 RX ADMIN — Medication 400 MILLIGRAM(S): at 13:01

## 2021-10-23 RX ADMIN — HYDROMORPHONE HYDROCHLORIDE 0.5 MILLIGRAM(S): 2 INJECTION INTRAMUSCULAR; INTRAVENOUS; SUBCUTANEOUS at 13:41

## 2021-10-23 RX ADMIN — SODIUM CHLORIDE 500 MILLILITER(S): 9 INJECTION INTRAMUSCULAR; INTRAVENOUS; SUBCUTANEOUS at 04:04

## 2021-10-23 RX ADMIN — ONDANSETRON 4 MILLIGRAM(S): 8 TABLET, FILM COATED ORAL at 02:53

## 2021-10-23 RX ADMIN — Medication 1000 MILLIGRAM(S): at 16:44

## 2021-10-23 RX ADMIN — SODIUM CHLORIDE 115 MILLILITER(S): 9 INJECTION, SOLUTION INTRAVENOUS at 22:26

## 2021-10-23 RX ADMIN — SODIUM CHLORIDE 1000 MILLILITER(S): 9 INJECTION, SOLUTION INTRAVENOUS at 13:01

## 2021-10-23 RX ADMIN — Medication 100 MILLIEQUIVALENT(S): at 19:40

## 2021-10-23 NOTE — H&P ADULT - NSHPLABSRESULTS_GEN_ALL_CORE
LABS                 13.4   10.52  )----------(  207       ( 22 Oct 2021 22:42 )               40.7      140    |  93     |  42.9   ----------------------------<  114        ( 22 Oct 2021 22:42 )  3.5     |  29.0   |  2.96     Ca    10.1       ( 22 Oct 2021 22:42 )  Mg     1.9       ( 22 Oct 2021 22:52 )    TPro  8.7    /  Alb  4.9    /  TBili  1.1    /  DBili  x      /  AST  25     /  ALT  19     /  AlkPhos  120    ( 22 Oct 2021 22:42 )    LIVER FUNCTIONS - ( 22 Oct 2021 22:42 )  Alb: 4.9 g/dL / Pro: 8.7 g/dL / ALK PHOS: 120 U/L / ALT: 19 U/L / AST: 25 U/L / GGT: x           PT/INR -  21.2 sec / 1.88 ratio   ( 22 Oct 2021 22:43 )       PTT -  41.0 sec   ( 22 Oct 2021 22:43 )  CAPILLARY BLOOD GLUCOSE            03:47 - VBG - pH:       | pCO2:       | pO2:       | Lactate: 1.30     --------------------------------------------------------------------------------------------  IMAGING  < from: CT Abdomen and Pelvis w/ Oral Cont (10.23.21 @ 01:40) >    FINDINGS:  LOWER CHEST: Subsegmental atelectasis at the lung bases. Degenerative changes. Median sternotomy.    LIVER: Within normal limits.  BILE DUCTS: Normal caliber.  GALLBLADDER: Cholelithiasis.  SPLEEN: Within normal limits.  PANCREAS: Within normal limits.  ADRENALS: Within normal limits.  KIDNEYS/URETERS: Within normal limits.    BLADDER: Within normal limits.  REPRODUCTIVE ORGANS: Prostate within normal limits.    BOWEL: Dilated loops of small bowel in the left abdomen, with transition point on series 5, image 61. Appendix not visualized. Anastomotic suture line at the rectum.  PERITONEUM: No ascites.  VESSELS: Chronic dissection of the aorta again demonstrated.  RETROPERITONEUM/LYMPH NODES: No lymphadenopathy.  ABDOMINAL WALL: Within normal limits.  BONES: Degenerative changes.    IMPRESSION:  Small bowel obstruction with transition point in the left midabdomen. No evidence of bowel pneumatosis or ascites.    < end of copied text >

## 2021-10-23 NOTE — ED ADULT NURSE REASSESSMENT NOTE - NS ED NURSE REASSESS COMMENT FT1
Neyda PEPE called with actual weight of pt to start heparin infusion. Waiting on new order at this time

## 2021-10-23 NOTE — CONSULT NOTE ADULT - ATTENDING COMMENTS
Seen and examined, agree with above unless specified bellow    1. Bowl obstruction. Pt has h/o SBO and now presents with bowl obstruction, NGT in place. May need surgery, eliquis is on Hold. Still not passing gas, but less pain since NGT placed. Has been vomiting for > 2 days, so unclear when was the last effective use of eliquis.   - Agree with holding eliquis for now.  - If no active bleeding, then advise to use full dose therapeutic IV heparin as he had recent (9/14/2021) extensive AFIB ablation and so at risk of stroke without AC. Heparin can be stopped and/or reversed if needed to go to OR. Use of AC post-op can be discussed with the surgical team after if he ended up going to surgery.    2. AFib, s/p AF ablation on 9/14/2021, remains in SR. and denies any palpitation, CP, dizziness, or SOB  - can c/w iv metoprolol as is for now, HOLD if bradycardia (HR < 50 while awake or <40 while asleep) or hypotension    will follow up    above d/w pt and surgical/trauma resident.   If needed cardiac clearance (other than the above clarified use of anticoagulation perioperatively) then pls consult general cardiology service.

## 2021-10-23 NOTE — ED ADULT NURSE REASSESSMENT NOTE - NS ED NURSE REASSESS COMMENT FT1
Assumed care from previous RN at this time. Plan of care reviewed. Chief complaint of vomiting. +Constipation and Nausea. Denies fever, chills, chest pain, sob. Hx of CABG and Bowel resection. Pt alert and oriented x3, respirations even and unlabored, skin warm and dry, color appropriate for ethnicity, speech clear, moving extremities. Updated pt on plan of care. Will continue to monitor. Assumed care from previous RN at this time. Plan of care reviewed. Chief complaint of vomiting. +Constipation and Nausea. Denies fever, chills, chest pain, sob. Hx of CABG and Bowel resection. Awaiting surgical consult. Pt alert and oriented x3, respirations even and unlabored, skin warm and dry, color appropriate for ethnicity, speech clear, moving extremities. Updated pt on plan of care. Will continue to monitor.

## 2021-10-23 NOTE — H&P ADULT - ASSESSMENT
ASSESSMENT: Patient is a 61y old male with SBO    PLAN:    - Admit to [facility] floor, under Dr. Mcmahan  - NPO/IVF  - serial abdominal exams  - pain meds after exam only  - DVT ppx  - OOB/ambulate  - strict I/Os  - Plan discussed with Attending, Dr. Mcmahan ASSESSMENT: Patient is a 61y old male with PMH of ESRD, MI, CVA, aortic aneurysm with aortic aneurysm repair c/b midline abdominal wound dehiscence requiring partial colectomy and ostomy with eventual reversal presenting to ED with SBO    PLAN:    - Admit to [facility] floor, under Dr. Mcmahan  - F/U CXR for NGT placement  - NPO/IVF  - serial abdominal exams  - pain meds after exam only  - DVT ppx  - OOB/ambulate  - strict I/Os  - Plan discussed with Attending, Dr. Mcmahan

## 2021-10-23 NOTE — H&P ADULT - ATTENDING COMMENTS
ON my exam: Alert and oriented, comfortably laying in bed. Well-healed abdominal scars, abdomen soft, ND, denies TTP.     Seen, examined, discussed with resident, agree with plan.

## 2021-10-23 NOTE — H&P ADULT - HISTORY OF PRESENT ILLNESS
HPI: 61y Male present to ED with abdominal pain. He reports two days of constipation, which is uncharacteristic for him, and progressive abdominal pain, n/v. Reports history of bowel resection requiring an ostomy and reversal in past and since that time he has had a BM with every meal. He has had episodes where he went longer than usual without BMs but not like this time. States his last BM and last flatus was 2 days prior to presentation.     ROS: 10-system review is otherwise negative except HPI above.      PAST MEDICAL & SURGICAL HISTORY:  CHF (congestive heart failure)  CVA (cerebral vascular accident)  Seizures  last seizure 10 years ago  HTN (hypertension)  Hyperlipemia  COVID-19 october 2020  Atrial fibrillation  ESRD on dialysis  Tu/Thurs/Sat  Former smoker  History of cocaine use  remote hx, currently sober  H/O aortic dissection  s/p repair (2010), surgery complicated by bowel ischemia s/p bowel resection and ostomy with reversal  H/O aortic valve insufficiency  Mitral regurgitation  GIB (gastrointestinal bleeding)  CAD (coronary artery disease)    s/p PCI 1998  and CABG x 2 11/2020  H/O colectomy  Status post double vessel coronary artery bypass  11/2020 Dr Butler  S/P AVR (aortic valve replacement)  (t)AVR 11/2020 Dr Butler  S/P MVR (mitral valve replacement)  (t)MVR 11/2020 Dr Butler  H/O aortic dissection  Type A; emergent repair 2010  AV fistula  LUE      FAMILY HISTORY:  FH: hypertension    Family history of cardiac disorder (Mother)  mother      Family history not pertinent as reviewed with the patient.    SOCIAL HISTORY:  Denies any toxic habits    ALLERGIES: NKA penicillin (Other)      HOME MEDICATIONS:   Home Medications:  ascorbic acid 500 mg oral tablet: 1 tab(s) orally once a day (14 Sep 2021 10:29)  atorvastatin 40 mg oral tablet: 1 tab(s) orally once a day (at bedtime) (14 Sep 2021 10:29)  Eliquis 5 mg oral tablet: 1  orally once a day (14 Sep 2021 10:29)  gabapentin 100 mg oral capsule: 1 cap(s) orally once a day (at bedtime) (14 Sep 2021 10:29)  levETIRAcetam 750 mg oral tablet: 1 tab(s) orally 2 times a day (14 Sep 2021 10:29)  sevelamer hydrochloride 800 mg oral tablet: 1 tab(s) orally 2 times a day (with meals) (14 Sep 2021 10:29)  Tab-A-Russel oral tablet: 1 tab(s) orally once a day (14 Sep 2021 10:29)      --------------------------------------------------------------------------------------------

## 2021-10-23 NOTE — H&P ADULT - NSHPPHYSICALEXAM_GEN_ALL_CORE
Vital Signs Last 24 Hrs  T(C): 36.6 (23 Oct 2021 03:20), Max: 37.1 (22 Oct 2021 19:06)  T(F): 97.8 (23 Oct 2021 03:20), Max: 98.7 (22 Oct 2021 19:06)  HR: 88 (23 Oct 2021 03:20) (78 - 98)  BP: 148/70 (23 Oct 2021 03:20) (144/78 - 161/84)  BP(mean): --  RR: 18 (23 Oct 2021 03:20) (16 - 18)  SpO2: 95% (23 Oct 2021 03:20) (95% - 97%)      PHYSICAL EXAM:   General: NAD, Lying in bed comfortably  Resp: Non labored breathing on RA  GI/Abd: Soft, ND, TTP in lower abdomen, no rebound/guarding, no masses palpated  -------------------------------------------------------------------------------------------- Partial Purse String (Simple) Text: Given the location of the defect and the characteristics of the surrounding skin a simple purse string closure was deemed most appropriate.  Undermining was performed circumfirentially around the surgical defect.  A purse string suture was then placed and tightened. Wound tension only allowed a partial closure of the circular defect.

## 2021-10-23 NOTE — ED ADULT NURSE NOTE - OBJECTIVE STATEMENT
a&ox4 c/o generalized abd pain x 3 days , LBM 3 days ago. + constipation . + N/V "clear and brown liquid"

## 2021-10-23 NOTE — CONSULT NOTE ADULT - SUBJECTIVE AND OBJECTIVE BOX
Patient well known to EP service from previous admissions:   In summary:     61 year old male patient with a known history of psoriasis, former cocaine abuse (quit 2010), Aortic dissection s/p emergent repair (2010), surgery complicated by CVA w/ residual left sided weakness and bowel ischemia s/p bowel resection and ostomy with reversal repair, Covid infection, ESRD on HD (Tu/Thur/Sat) s/p LUE AVF, HTN, seizure disorder, GIB s/p colonoscopy and AVMs cauterization 3/2021 and CAD s/p remote stent to LAD and recent CABG x 2 (DGV-OM, RPDA with Dr. Butler 11/2020), AI and MR s/p tAVR, tMVR (11/2020 Dr. Butler) with post-op pAfib/flutter (on Eliquis HFrEF (EF 45-50% 02/21), and s/p uncomplicated radiofrequency ablation atrial fibrillation and atrial flutter ([facility]/PVI, CTI and mitral flutter) on 9/14/21. Patient presents this admission with two days of constipation and worsening abdominal discomfort associated with nausea and vomiting. No complaints of chest pain, dyspnea, palpitations fever or chills.    Previous Cardiology Summary  TTE: 11/2020: LVEF 40%. Moderately decreased global left ventricular systolic function. Moderately reduced RV systolic function. A bioprosthetic valve is present in the mitral position. Trace mitral valve regurgitation. Moderate tricuspid regurgitation. Bioprosthesis in the aortic position. Estimated pulmonary artery systolic pressure is 40.9 mmHg assuming a right atrial pressure of 8 mmHg, which is consistent with mild pulmonary hypertension. Mitral valve mean gradient is 2.2 mmHg consistent with mild mitral stenosis.    PAST MEDICAL & SURGICAL HISTORY:  CHF (congestive heart failure)  CVA (cerebral vascular accident)  Seizures  last seizure 10 years ago  HTN (hypertension)  Hyperlipemia  COVID-19 october 2020  Atrial fibrillation  ESRD on dialysis  Tu/Thurs/Sat  Former smoker  History of cocaine use  remote hx, currently sober  H/O aortic dissection  s/p repair (2010), surgery complicated by bowel ischemia s/p bowel resection and ostomy with reversal  H/O aortic valve insufficiency  Mitral regurgitation  GIB (gastrointestinal bleeding)  CAD (coronary artery disease)  s/p PCI 1998  and CABG x 2 11/2020  H/O colectomy  Status post double vessel coronary artery bypass  11/2020 Dr Butler  S/P AVR (aortic valve replacement)  (t)AVR 11/2020 Dr Butler  S/P MVR (mitral valve replacement)  (t)MVR 11/2020 Dr Butler  H/O aortic dissection  Type A; emergent repair 2010  AV fistula  LUE    REVIEW OF SYSTEMS  General: - fever or chills  Skin/Breast: - rashes  Ophthalmologic: - blurred vision	  ENMT: - sore throat  Respiratory and Thorax: - cough  Cardiovascular: - chest pain, - palpitaitons, - dyspnea  Gastrointestinal:	+N/V/C  Genitourinary: - dysuria  Musculoskeletal:	 - arthritis  Neurological: - weaknesses  Psychiatric: - depression	    MEDICATIONS  (STANDING):  heparin   Injectable 5000 Unit(s) SubCutaneous every 8 hours  HYDROmorphone  Injectable 0.5 milliGRAM(s) IV Push once  lactated ringers Bolus 1000 milliLiter(s) IV Bolus once  lactated ringers. 1000 milliLiter(s) (115 mL/Hr) IV Continuous <Continuous>  metoprolol tartrate Injectable 5 milliGRAM(s) IV Push every 6 hours  pantoprazole  Injectable 40 milliGRAM(s) IV Push daily    MEDICATIONS  (PRN):  acetaminophen   IVPB .. 1000 milliGRAM(s) IV Intermittent every 6 hours PRN Temp greater or equal to 38C (100.4F), Mild Pain (1 - 3), Moderate Pain (4 - 6)    Allergies  penicillin (Other)    SOCIAL HISTORY: Former smoker and cocaine use. Both stopped 2010. 1 cup coffee daily, former drinker    FAMILY HISTORY:  FH: hypertension    Family history of cardiac disorder (Mother)  mother    Vital Signs Last 24 Hrs  T(C): 36.7 (23 Oct 2021 11:27), Max: 37.1 (22 Oct 2021 19:06)  T(F): 98.1 (23 Oct 2021 11:27), Max: 98.7 (22 Oct 2021 19:06)  HR: 74 (23 Oct 2021 11:27) (74 - 98)  BP: 147/68 (23 Oct 2021 11:27) (144/78 - 161/84)  RR: 20 (23 Oct 2021 11:27) (16 - 20)  SpO2: 97% (23 Oct 2021 11:27) (95% - 99%)    Physical Exam:  Constitutional: AAOx3, NAD  Neck: supple, No JVD  Cardiovascular: +S1S2 RRR, no murmurs, rubs, gallops   Pulmonary: CTA b/l, unlabored, no wheezes, rales. rhonci  Abdomen: +BS, soft NTND  Extremities: no edema b/l, +distal pulses b/l  Neuro: non focal, speech clear, CORDOVA x 4    LABS:                        13.4   10.52 )-----------( 207      ( 22 Oct 2021 22:42 )             40.7    140  |  93<L>  |  42.9<H>  ----------------------------<  114<H>  3.5   |  29.0  |  2.96<H>  Ca    10.1      22 Oct 2021 22:42  Mg     1.9     10-22  TPro  8.7  /  Alb  4.9  /  TBili  1.1  /  DBili  x   /  AST  25  /  ALT  19  /  AlkPhos  120  10-22  LIVER FUNCTIONS - ( 22 Oct 2021 22:42 )  Alb: 4.9 g/dL / Pro: 8.7 g/dL / ALK PHOS: 120 U/L / ALT: 19 U/L / AST: 25 U/L / GGT: x         PT/INR - ( 22 Oct 2021 22:43 )   PT: 21.2 sec;   INR: 1.88 ratio    PTT - ( 22 Oct 2021 22:43 )  PTT:41.0 sec    RADIOLOGY & ADDITIONAL STUDIES:  CT abdomen 10/23/21  IMPRESSION:  Small bowel obstruction with transition point in the left midabdomen. No evidence of bowel pneumatosis or ascites.    EKG: SR at 83bpm; PAC; QRSD 102ms    A/P  61 year old male patient with a known history of psoriasis, Aortic dissection s/p emergent repair (2010), surgery complicated by CVA w/ residual left sided weakness and bowel ischemia s/p bowel resection and ostomy with reversal repair, ESRD on HD (Tu/Thur/Sat) s/p LUE AVF, HTN, seizure disorder, GIB s/p colonoscopy and AVMs cauterization 3/2021 and CAD s/p remote stent to LAD and recent CABG x 2, AI and MR s/p tAVR, tMVR (11/2020 Dr. Butler) with post-op pAfib/flutter who is s/p uncomplicated radiofrequency ablation atrial fibrillation and atrial flutter ([facility]/PVI, CTI and mitral flutter) on 9/14/21. Admitted now with SBO.        Patient well known to EP service from previous admissions:   In summary:     61 year old male patient with a known history of psoriasis, former cocaine abuse (quit 2010), Aortic dissection s/p emergent repair (2010), surgery complicated by CVA w/ residual left sided weakness and bowel ischemia s/p bowel resection and ostomy with reversal repair, Covid infection, ESRD on HD (Tu/Thur/Sat) s/p LUE AVF, HTN, seizure disorder, GIB s/p colonoscopy and AVMs cauterization 3/2021 and CAD s/p remote stent to LAD and recent CABG x 2 (DGV-OM, RPDA with Dr. Butler 11/2020), AI and MR s/p tAVR, tMVR (11/2020 Dr. Butler) with post-op pAfib/flutter (on Eliquis HFrEF (EF 45-50% 02/21), and s/p uncomplicated radiofrequency ablation atrial fibrillation and atrial flutter ([facility]/PVI, CTI and mitral flutter) on 9/14/21. Patient presents this admission with two days of constipation and worsening abdominal discomfort associated with nausea and vomiting. He reports these symptoms started suddenly. He admits that after meals he wasn't able to keep them down and would throw up his pills as well for these last two days. He has a history of these types of events since his abdominal surgery which are self limiting. Late last night he does admit to brief palpitations. No complaints of chest pain, dyspnea, fever or chills.    Previous Cardiology Summary  TTE: 11/2020: LVEF 40%. Moderately decreased global left ventricular systolic function. Moderately reduced RV systolic function. A bioprosthetic valve is present in the mitral position. Trace mitral valve regurgitation. Moderate tricuspid regurgitation. Bioprosthesis in the aortic position. Estimated pulmonary artery systolic pressure is 40.9 mmHg assuming a right atrial pressure of 8 mmHg, which is consistent with mild pulmonary hypertension. Mitral valve mean gradient is 2.2 mmHg consistent with mild mitral stenosis.    PAST MEDICAL & SURGICAL HISTORY:  CHF (congestive heart failure)  CVA (cerebral vascular accident)  Seizures  last seizure 10 years ago  HTN (hypertension)  Hyperlipemia  COVID-19 october 2020  Atrial fibrillation  ESRD on dialysis  Tu/Thurs/Sat  Former smoker  History of cocaine use  remote hx, currently sober  H/O aortic dissection  s/p repair (2010), surgery complicated by bowel ischemia s/p bowel resection and ostomy with reversal  H/O aortic valve insufficiency  Mitral regurgitation  GIB (gastrointestinal bleeding)  CAD (coronary artery disease)  s/p PCI 1998  and CABG x 2 11/2020  H/O colectomy  Status post double vessel coronary artery bypass  11/2020 Dr Butler  S/P AVR (aortic valve replacement)  (t)AVR 11/2020 Dr Butler  S/P MVR (mitral valve replacement)  (t)MVR 11/2020 Dr Butler  H/O aortic dissection  Type A; emergent repair 2010  AV fistula  LUE    REVIEW OF SYSTEMS  General: - fever or chills  Skin/Breast: - rashes  Ophthalmologic: - blurred vision	  ENMT: - sore throat  Respiratory and Thorax: - cough  Cardiovascular: - chest pain, + palpitations, - dyspnea  Gastrointestinal:	+N/V/C  Genitourinary: - dysuria  Musculoskeletal:	 - arthritis  Neurological: - weaknesses  Psychiatric: - depression	    MEDICATIONS  (STANDING):  heparin   Injectable 5000 Unit(s) SubCutaneous every 8 hours  HYDROmorphone  Injectable 0.5 milliGRAM(s) IV Push once  lactated ringers Bolus 1000 milliLiter(s) IV Bolus once  lactated ringers. 1000 milliLiter(s) (115 mL/Hr) IV Continuous <Continuous>  metoprolol tartrate Injectable 5 milliGRAM(s) IV Push every 6 hours  pantoprazole  Injectable 40 milliGRAM(s) IV Push daily    MEDICATIONS  (PRN):  acetaminophen   IVPB .. 1000 milliGRAM(s) IV Intermittent every 6 hours PRN Temp greater or equal to 38C (100.4F), Mild Pain (1 - 3), Moderate Pain (4 - 6)    Allergies  penicillin (Other)    SOCIAL HISTORY: Former smoker and cocaine use. Both stopped 2010. 1 cup coffee daily, former drinker    FAMILY HISTORY:  FH: hypertension    Family history of cardiac disorder (Mother)  mother    Vital Signs Last 24 Hrs  T(C): 36.7 (23 Oct 2021 11:27), Max: 37.1 (22 Oct 2021 19:06)  T(F): 98.1 (23 Oct 2021 11:27), Max: 98.7 (22 Oct 2021 19:06)  HR: 74 (23 Oct 2021 11:27) (74 - 98)  BP: 147/68 (23 Oct 2021 11:27) (144/78 - 161/84)  RR: 20 (23 Oct 2021 11:27) (16 - 20)  SpO2: 97% (23 Oct 2021 11:27) (95% - 99%)    Physical Exam:  Constitutional: AAOx3, NAD  Neck: supple, No JVD, +NG tube with brown secretions at bedside. Dark blood noted at nares in tube.   Cardiovascular: +S1S2 RRR, no murmurs, rubs, gallops   Pulmonary: CTA b/l, unlabored, no wheezes, rales. rhonci  Abdomen: +BS in all 4 quadrants. Tinkling sounds in left upper quadrant  Extremities: no edema b/l, +distal pulses b/l  Neuro: non focal, speech clear, CORDOVA x 4    LABS:                        13.4   10.52 )-----------( 207      ( 22 Oct 2021 22:42 )             40.7    140  |  93<L>  |  42.9<H>  ----------------------------<  114<H>  3.5   |  29.0  |  2.96<H>  Ca    10.1      22 Oct 2021 22:42  Mg     1.9     10-22  TPro  8.7  /  Alb  4.9  /  TBili  1.1  /  DBili  x   /  AST  25  /  ALT  19  /  AlkPhos  120  10-22  LIVER FUNCTIONS - ( 22 Oct 2021 22:42 )  Alb: 4.9 g/dL / Pro: 8.7 g/dL / ALK PHOS: 120 U/L / ALT: 19 U/L / AST: 25 U/L / GGT: x         PT/INR - ( 22 Oct 2021 22:43 )   PT: 21.2 sec;   INR: 1.88 ratio    PTT - ( 22 Oct 2021 22:43 )  PTT:41.0 sec    RADIOLOGY & ADDITIONAL STUDIES:  CT abdomen 10/23/21  IMPRESSION:  Small bowel obstruction with transition point in the left midabdomen. No evidence of bowel pneumatosis or ascites.    EKG: SR at 83bpm; PAC; QRSD 102ms    A/P  61 year old male patient with a known history of psoriasis, Aortic dissection s/p emergent repair (2010), surgery complicated by CVA w/ residual left sided weakness and bowel ischemia s/p bowel resection and ostomy with reversal repair, ESRD on HD (Tu/Thur/Sat) s/p LUE AVF, HTN, seizure disorder, GIB s/p colonoscopy and AVMs cauterization 3/2021 and CAD s/p remote stent to LAD and recent CABG x 2, AI and MR s/p tAVR, tMVR (11/2020 Dr. Butler) with post-op pAfib/flutter who is s/p uncomplicated radiofrequency ablation atrial fibrillation and atrial flutter ([facility]/PVI, CTI and mitral flutter) on 9/14/21. Admitted now with SBO.     - Remains in SR on EKG  - Due to vomiting PO absorption of Eliquis may have been limited  - Awaiting surgery recommendations. Patient now 30 days post ablation but still at high risk of CVA. Would prefer to bridge with IV Heparin if possible and to resume oral anticoagulation once acute issues resolve.

## 2021-10-24 LAB
ALBUMIN SERPL ELPH-MCNC: 5 G/DL — SIGNIFICANT CHANGE UP (ref 3.3–5.2)
ALP SERPL-CCNC: 113 U/L — SIGNIFICANT CHANGE UP (ref 40–120)
ALT FLD-CCNC: 17 U/L — SIGNIFICANT CHANGE UP
ANION GAP SERPL CALC-SCNC: 25 MMOL/L — HIGH (ref 5–17)
ANION GAP SERPL CALC-SCNC: 29 MMOL/L — HIGH (ref 5–17)
APTT BLD: 168.8 SEC — CRITICAL HIGH (ref 27.5–35.5)
APTT BLD: 68.2 SEC — HIGH (ref 27.5–35.5)
APTT BLD: 68.8 SEC — HIGH (ref 27.5–35.5)
AST SERPL-CCNC: 33 U/L — SIGNIFICANT CHANGE UP
BASOPHILS # BLD AUTO: 0.07 K/UL — SIGNIFICANT CHANGE UP (ref 0–0.2)
BASOPHILS NFR BLD AUTO: 0.8 % — SIGNIFICANT CHANGE UP (ref 0–2)
BILIRUB SERPL-MCNC: 1.3 MG/DL — SIGNIFICANT CHANGE UP (ref 0.4–2)
BUN SERPL-MCNC: 69.8 MG/DL — HIGH (ref 8–20)
BUN SERPL-MCNC: 71 MG/DL — HIGH (ref 8–20)
CALCIUM SERPL-MCNC: 9.7 MG/DL — SIGNIFICANT CHANGE UP (ref 8.6–10.2)
CALCIUM SERPL-MCNC: 9.8 MG/DL — SIGNIFICANT CHANGE UP (ref 8.6–10.2)
CHLORIDE SERPL-SCNC: 87 MMOL/L — LOW (ref 98–107)
CHLORIDE SERPL-SCNC: 90 MMOL/L — LOW (ref 98–107)
CO2 SERPL-SCNC: 23 MMOL/L — SIGNIFICANT CHANGE UP (ref 22–29)
CO2 SERPL-SCNC: 26 MMOL/L — SIGNIFICANT CHANGE UP (ref 22–29)
COVID-19 SPIKE DOMAIN AB INTERP: POSITIVE
COVID-19 SPIKE DOMAIN ANTIBODY RESULT: >250 U/ML — HIGH
CREAT SERPL-MCNC: 3.86 MG/DL — HIGH (ref 0.5–1.3)
CREAT SERPL-MCNC: 4.01 MG/DL — HIGH (ref 0.5–1.3)
EOSINOPHIL # BLD AUTO: 0.23 K/UL — SIGNIFICANT CHANGE UP (ref 0–0.5)
EOSINOPHIL NFR BLD AUTO: 2.8 % — SIGNIFICANT CHANGE UP (ref 0–6)
GLUCOSE SERPL-MCNC: 107 MG/DL — HIGH (ref 70–99)
GLUCOSE SERPL-MCNC: 108 MG/DL — HIGH (ref 70–99)
HCT VFR BLD CALC: 40.7 % — SIGNIFICANT CHANGE UP (ref 39–50)
HCT VFR BLD CALC: 41.3 % — SIGNIFICANT CHANGE UP (ref 39–50)
HGB BLD-MCNC: 13.6 G/DL — SIGNIFICANT CHANGE UP (ref 13–17)
HGB BLD-MCNC: 13.8 G/DL — SIGNIFICANT CHANGE UP (ref 13–17)
IMM GRANULOCYTES NFR BLD AUTO: 0.4 % — SIGNIFICANT CHANGE UP (ref 0–1.5)
INR BLD: 1.6 RATIO — HIGH (ref 0.88–1.16)
LYMPHOCYTES # BLD AUTO: 1.78 K/UL — SIGNIFICANT CHANGE UP (ref 1–3.3)
LYMPHOCYTES # BLD AUTO: 21.3 % — SIGNIFICANT CHANGE UP (ref 13–44)
MAGNESIUM SERPL-MCNC: 1.9 MG/DL — SIGNIFICANT CHANGE UP (ref 1.6–2.6)
MAGNESIUM SERPL-MCNC: 2 MG/DL — SIGNIFICANT CHANGE UP (ref 1.6–2.6)
MCHC RBC-ENTMCNC: 29.4 PG — SIGNIFICANT CHANGE UP (ref 27–34)
MCHC RBC-ENTMCNC: 29.9 PG — SIGNIFICANT CHANGE UP (ref 27–34)
MCHC RBC-ENTMCNC: 33.4 GM/DL — SIGNIFICANT CHANGE UP (ref 32–36)
MCHC RBC-ENTMCNC: 33.4 GM/DL — SIGNIFICANT CHANGE UP (ref 32–36)
MCV RBC AUTO: 87.9 FL — SIGNIFICANT CHANGE UP (ref 80–100)
MCV RBC AUTO: 89.4 FL — SIGNIFICANT CHANGE UP (ref 80–100)
MONOCYTES # BLD AUTO: 1.13 K/UL — HIGH (ref 0–0.9)
MONOCYTES NFR BLD AUTO: 13.5 % — SIGNIFICANT CHANGE UP (ref 2–14)
NEUTROPHILS # BLD AUTO: 5.1 K/UL — SIGNIFICANT CHANGE UP (ref 1.8–7.4)
NEUTROPHILS NFR BLD AUTO: 61.2 % — SIGNIFICANT CHANGE UP (ref 43–77)
PHOSPHATE SERPL-MCNC: 5.4 MG/DL — HIGH (ref 2.4–4.7)
PHOSPHATE SERPL-MCNC: 5.4 MG/DL — HIGH (ref 2.4–4.7)
PLATELET # BLD AUTO: 228 K/UL — SIGNIFICANT CHANGE UP (ref 150–400)
PLATELET # BLD AUTO: 243 K/UL — SIGNIFICANT CHANGE UP (ref 150–400)
POTASSIUM SERPL-MCNC: 3.6 MMOL/L — SIGNIFICANT CHANGE UP (ref 3.5–5.3)
POTASSIUM SERPL-MCNC: 3.7 MMOL/L — SIGNIFICANT CHANGE UP (ref 3.5–5.3)
POTASSIUM SERPL-SCNC: 3.6 MMOL/L — SIGNIFICANT CHANGE UP (ref 3.5–5.3)
POTASSIUM SERPL-SCNC: 3.7 MMOL/L — SIGNIFICANT CHANGE UP (ref 3.5–5.3)
PROT SERPL-MCNC: 8.5 G/DL — SIGNIFICANT CHANGE UP (ref 6.6–8.7)
PROTHROM AB SERPL-ACNC: 18.1 SEC — HIGH (ref 10.6–13.6)
RBC # BLD: 4.62 M/UL — SIGNIFICANT CHANGE UP (ref 4.2–5.8)
RBC # BLD: 4.63 M/UL — SIGNIFICANT CHANGE UP (ref 4.2–5.8)
RBC # FLD: 14.2 % — SIGNIFICANT CHANGE UP (ref 10.3–14.5)
RBC # FLD: 14.2 % — SIGNIFICANT CHANGE UP (ref 10.3–14.5)
SARS-COV-2 IGG+IGM SERPL QL IA: >250 U/ML — HIGH
SARS-COV-2 IGG+IGM SERPL QL IA: POSITIVE
SODIUM SERPL-SCNC: 138 MMOL/L — SIGNIFICANT CHANGE UP (ref 135–145)
SODIUM SERPL-SCNC: 141 MMOL/L — SIGNIFICANT CHANGE UP (ref 135–145)
WBC # BLD: 8.17 K/UL — SIGNIFICANT CHANGE UP (ref 3.8–10.5)
WBC # BLD: 8.34 K/UL — SIGNIFICANT CHANGE UP (ref 3.8–10.5)
WBC # FLD AUTO: 8.17 K/UL — SIGNIFICANT CHANGE UP (ref 3.8–10.5)
WBC # FLD AUTO: 8.34 K/UL — SIGNIFICANT CHANGE UP (ref 3.8–10.5)

## 2021-10-24 PROCEDURE — 71045 X-RAY EXAM CHEST 1 VIEW: CPT | Mod: 26

## 2021-10-24 PROCEDURE — 71045 X-RAY EXAM CHEST 1 VIEW: CPT | Mod: 26,77

## 2021-10-24 PROCEDURE — 99232 SBSQ HOSP IP/OBS MODERATE 35: CPT

## 2021-10-24 PROCEDURE — 74018 RADEX ABDOMEN 1 VIEW: CPT | Mod: 26

## 2021-10-24 PROCEDURE — 99223 1ST HOSP IP/OBS HIGH 75: CPT

## 2021-10-24 PROCEDURE — 74018 RADEX ABDOMEN 1 VIEW: CPT | Mod: 26,77,76

## 2021-10-24 PROCEDURE — 99231 SBSQ HOSP IP/OBS SF/LOW 25: CPT | Mod: GC

## 2021-10-24 RX ORDER — POLYETHYLENE GLYCOL 3350 17 G/17G
17 POWDER, FOR SOLUTION ORAL ONCE
Refills: 0 | Status: DISCONTINUED | OUTPATIENT
Start: 2021-10-24 | End: 2021-10-24

## 2021-10-24 RX ORDER — HEPARIN SODIUM 5000 [USP'U]/ML
1700 INJECTION INTRAVENOUS; SUBCUTANEOUS
Qty: 25000 | Refills: 0 | Status: DISCONTINUED | OUTPATIENT
Start: 2021-10-24 | End: 2021-10-26

## 2021-10-24 RX ORDER — ACETAMINOPHEN 500 MG
1000 TABLET ORAL ONCE
Refills: 0 | Status: COMPLETED | OUTPATIENT
Start: 2021-10-24 | End: 2021-10-24

## 2021-10-24 RX ORDER — SODIUM CHLORIDE 9 MG/ML
1000 INJECTION, SOLUTION INTRAVENOUS
Refills: 0 | Status: DISCONTINUED | OUTPATIENT
Start: 2021-10-24 | End: 2021-10-24

## 2021-10-24 RX ORDER — SODIUM CHLORIDE 9 MG/ML
1000 INJECTION, SOLUTION INTRAVENOUS ONCE
Refills: 0 | Status: COMPLETED | OUTPATIENT
Start: 2021-10-24 | End: 2021-10-24

## 2021-10-24 RX ADMIN — Medication 5 MILLIGRAM(S): at 23:12

## 2021-10-24 RX ADMIN — Medication 400 MILLIGRAM(S): at 03:45

## 2021-10-24 RX ADMIN — Medication 5 MILLIGRAM(S): at 17:38

## 2021-10-24 RX ADMIN — PANTOPRAZOLE SODIUM 40 MILLIGRAM(S): 20 TABLET, DELAYED RELEASE ORAL at 10:03

## 2021-10-24 RX ADMIN — Medication 5 MILLIGRAM(S): at 10:03

## 2021-10-24 RX ADMIN — Medication 400 MILLIGRAM(S): at 23:54

## 2021-10-24 RX ADMIN — SODIUM CHLORIDE 115 MILLILITER(S): 9 INJECTION, SOLUTION INTRAVENOUS at 13:07

## 2021-10-24 RX ADMIN — Medication 1000 MILLIGRAM(S): at 04:54

## 2021-10-24 RX ADMIN — Medication 400 MILLIGRAM(S): at 10:02

## 2021-10-24 RX ADMIN — HEPARIN SODIUM 1700 UNIT(S)/HR: 5000 INJECTION INTRAVENOUS; SUBCUTANEOUS at 02:59

## 2021-10-24 RX ADMIN — Medication 5 MILLIGRAM(S): at 00:06

## 2021-10-24 RX ADMIN — HEPARIN SODIUM 1700 UNIT(S)/HR: 5000 INJECTION INTRAVENOUS; SUBCUTANEOUS at 10:01

## 2021-10-24 RX ADMIN — SODIUM CHLORIDE 1000 MILLILITER(S): 9 INJECTION, SOLUTION INTRAVENOUS at 22:57

## 2021-10-24 RX ADMIN — Medication 1000 MILLIGRAM(S): at 10:15

## 2021-10-24 RX ADMIN — HEPARIN SODIUM 0 UNIT(S)/HR: 5000 INJECTION INTRAVENOUS; SUBCUTANEOUS at 23:29

## 2021-10-24 RX ADMIN — Medication 1000 MILLIGRAM(S): at 17:50

## 2021-10-24 RX ADMIN — Medication 400 MILLIGRAM(S): at 17:38

## 2021-10-24 NOTE — CONSULT NOTE ADULT - SUBJECTIVE AND OBJECTIVE BOX
[facility] DIVISION OF KIDNEY DISEASES AND HYPERTENSION -- INITIAL CONSULT NOTE  --------------------------------------------------------------------------------  HPI:   61y Male present to ED with abdominal pain. He reports two days of constipation, which is uncharacteristic for him, and progressive abdominal pain, n/v. Reports history of bowel resection requiring an ostomy and reversal in past and since that time he has had a BM with every meal. He has had episodes where he went longer than usual without BMs but not like this time. States his last BM and last flatus was 2 days prior to presentation.   Pt seen/examined this AM; celina michele in place; NPO; abdominal pain;      PAST HISTORY  --------------------------------------------------------------------------------  PAST MEDICAL & SURGICAL HISTORY:  CHF (congestive heart failure)    CVA (cerebral vascular accident)    Seizures  last seizure 10 years ago    HTN (hypertension)    Hyperlipemia    COVID-19 october 2020    Atrial fibrillation    ESRD on dialysis  Tu/Thurs/Sat    Former smoker    History of cocaine use  remote hx, currently sober    H/O aortic dissection  s/p repair (2010), surgery complicated by bowel ischemia s/p bowel resection and ostomy with reversal    H/O aortic valve insufficiency    Mitral regurgitation    GIB (gastrointestinal bleeding)    CAD (coronary artery disease)  s/p PCI 1998  and CABG x 2 11/2020    H/O colectomy    Status post double vessel coronary artery bypass  11/2020 Dr Butler    S/P AVR (aortic valve replacement)  (t)AVR 11/2020 Dr Butler    S/P MVR (mitral valve replacement)  (t)MVR 11/2020 Dr Butler    H/O aortic dissection  Type A; emergent repair 2010    AV fistula  LUE      FAMILY HISTORY:  FH: hypertension    Family history of cardiac disorder (Mother)  mother      PAST SOCIAL HISTORY:    ALLERGIES & MEDICATIONS  --------------------------------------------------------------------------------  Allergies    penicillin (Other)    Intolerances      Standing Inpatient Medications  heparin  Infusion. 1700 Unit(s)/Hr IV Continuous <Continuous>  lactated ringers. 1000 milliLiter(s) IV Continuous <Continuous>  metoprolol tartrate Injectable 5 milliGRAM(s) IV Push every 6 hours  pantoprazole  Injectable 40 milliGRAM(s) IV Push daily    PRN Inpatient Medications  acetaminophen   IVPB .. 1000 milliGRAM(s) IV Intermittent every 6 hours PRN      REVIEW OF SYSTEMS  --------------------------------------------------------------------------------  Gen: No weight changes, fatigue, fevers/chills, weakness  Skin: No rashes  Head/Eyes/Ears/Mouth: No headache; Normal hearing; Normal vision w/o blurriness; No sinus pain/discomfort, sore throat  Respiratory: No dyspnea, cough, wheezing, hemoptysis  CV: No chest pain, PND, orthopnea  GI: +abdominal pain,   : No increased frequency, dysuria, hematuria, nocturia  MSK: No joint pain/swelling; no back pain; no edema  Neuro: No dizziness/lightheadedness, weakness, seizures, numbness, tingling  Heme: No easy bruising or bleeding  Endo: No heat/cold intolerance  Psych: No significant nervousness, anxiety, stress, depression    All other systems were reviewed and are negative, except as noted.    VITALS/PHYSICAL EXAM  --------------------------------------------------------------------------------  T(C): 36.2 (10-24-21 @ 09:05), Max: 36.9 (10-23-21 @ 23:33)  HR: 99 (10-24-21 @ 09:05) (75 - 99)  BP: 143/64 (10-24-21 @ 09:05) (118/58 - 161/73)  RR: 18 (10-24-21 @ 09:05) (16 - 18)  SpO2: 95% (10-24-21 @ 09:05) (95% - 98%)  Wt(kg): --  Height (cm): 172.7 (10-22-21 @ 19:06)  Weight (kg): 84.2 (10-23-21 @ 22:43)  BMI (kg/m2): 28.2 (10-23-21 @ 22:43)  BSA (m2): 1.98 (10-23-21 @ 22:43)      10-23-21 @ 07:01  -  10-24-21 @ 07:00  --------------------------------------------------------  IN: 460 mL / OUT: 0 mL / NET: 460 mL      Physical Exam:  	Gen: NAD,    	HEENT: NGT/salem sump in place  	Pulm: CTA B/L  	CV: RRR, S1S2; no rub  	Back: No spinal or CVA tenderness; no sacral edema  	Abd: +distended  	: No suprapubic tenderness  	UE: Warm, FROM, no clubbing, intact strength; no edema; no asterixis  	LE: Warm, FROM, no clubbing, intact strength; no edema  	Neuro: No focal deficits, intact gait  	Psych: Normal affect and mood  	Skin: Warm, without rashes       LABS/STUDIES  --------------------------------------------------------------------------------              13.6   8.34  >-----------<  243      [10-24-21 @ 09:21]              40.7     138  |  87  |  71.0  ----------------------------<  108      [10-24-21 @ 09:21]  3.6   |  23.0  |  4.01        Ca     9.8     [10-24-21 @ 09:21]      Mg     1.9     [10-24-21 @ 09:21]      Phos  5.4     [10-24-21 @ 09:21]    TPro  8.5  /  Alb  5.0  /  TBili  1.3  /  DBili  x   /  AST  33  /  ALT  17  /  AlkPhos  113  [10-24-21 @ 09:21]    PT/INR: PT 18.1 , INR 1.60       [10-24-21 @ 09:21]  PTT: 68.2       [10-24-21 @ 09:21]      Creatinine Trend:  SCr 4.01 [10-24 @ 09:21]  SCr 3.37 [10-23 @ 12:58]  SCr 2.96 [10-22 @ 22:42]        Iron 29, TIBC 227, %sat 13      [12-05-20 @ 08:05]  Ferritin 1099      [12-05-20 @ 08:05]  TSH 2.16      [08-12-21 @ 12:05]  Lipid: chol --, , HDL --, LDL --      [11-15-20 @ 02:40]    HBsAb <3.0      [06-25-21 @ 02:07]  HBsAg Nonreact      [08-14-21 @ 02:18]  HBcAb Nonreact      [11-07-20 @ 05:41]  HCV 0.10, Nonreact      [12-07-20 @ 14:20]    SPEP Interpretation: Normal Electrophoresis Pattern      [12-05-20 @ 08:05]

## 2021-10-24 NOTE — PROGRESS NOTE ADULT - SUBJECTIVE AND OBJECTIVE BOX
Subjective:    No acute events overnight. pt continues to have abdominal pain.     MEDICATIONS  (STANDING):  heparin  Infusion.  Unit(s)/Hr (15 mL/Hr) IV Continuous <Continuous>  lactated ringers. 1000 milliLiter(s) (115 mL/Hr) IV Continuous <Continuous>  metoprolol tartrate Injectable 5 milliGRAM(s) IV Push every 6 hours  pantoprazole  Injectable 40 milliGRAM(s) IV Push daily    MEDICATIONS  (PRN):  acetaminophen   IVPB .. 1000 milliGRAM(s) IV Intermittent every 6 hours PRN Temp greater or equal to 38C (100.4F), Mild Pain (1 - 3), Moderate Pain (4 - 6)      Vital Signs Last 24 Hrs  T(C): 36.4 (24 Oct 2021 00:16), Max: 36.9 (23 Oct 2021 23:33)  T(F): 97.5 (24 Oct 2021 00:16), Max: 98.5 (23 Oct 2021 23:33)  HR: 88 (24 Oct 2021 00:16) (74 - 88)  BP: 148/67 (23 Oct 2021 23:33) (145/58 - 152/70)  BP(mean): --  RR: 18 (23 Oct 2021 23:33) (16 - 20)  SpO2: 98% (23 Oct 2021 23:33) (95% - 99%)        Physical Exam:    General: NAD, Lying in bed comfortably  Resp: Non labored breathing on RA  GI/Abd: Soft, ND, TTP in lower abdomen, no rebound/guarding, no masses palpated      LABS:                        13.3   6.95  )-----------( 219      ( 23 Oct 2021 21:23 )             40.4     10-23    140  |  90<L>  |  54.2<H>  ----------------------------<  118<H>  3.1<L>   |  29.0  |  3.37<H>    Ca    9.7      23 Oct 2021 12:58  Phos  5.4     10-23  Mg     2.0     10-23    TPro  8.7  /  Alb  4.9  /  TBili  1.1  /  DBili  x   /  AST  25  /  ALT  19  /  AlkPhos  120  10-22    PT/INR - ( 23 Oct 2021 21:22 )   PT: 18.8 sec;   INR: 1.66 ratio         PTT - ( 23 Oct 2021 21:22 )  PTT:34.9 sec

## 2021-10-24 NOTE — PROGRESS NOTE ADULT - SUBJECTIVE AND OBJECTIVE BOX
Patient well known to EP service from previous admissions:   In summary:     61 year old male patient with a known history of psoriasis, former cocaine abuse (quit 2010), Aortic dissection s/p emergent repair (2010), surgery complicated by CVA w/ residual left sided weakness and bowel ischemia s/p bowel resection and ostomy with reversal repair, Covid infection, ESRD on HD (Tu/Thur/Sat) s/p LUE AVF, HTN, seizure disorder, GIB s/p colonoscopy and AVMs cauterization 3/2021 and CAD s/p remote stent to LAD and recent CABG x 2 (DGV-OM, RPDA with Dr. Butler 11/2020), AI and MR s/p tAVR, tMVR (11/2020 Dr. Butler) with post-op pAfib/flutter (on Eliquis HFrEF (EF 45-50% 02/21), and s/p uncomplicated radiofrequency ablation atrial fibrillation and atrial flutter ([facility]/PVI, CTI and mitral flutter) on 9/14/21. Patient presents this admission with two days of constipation and worsening abdominal discomfort associated with nausea and vomiting. He reports these symptoms started suddenly. He admits that after meals he wasn't able to keep them down and would throw up his pills as well for these last two days. He has a history of these types of events since his abdominal surgery which are self limiting. Late last night he does admit to brief palpitations. No complaints of chest pain, dyspnea, fever or chills.    Interval Hx 10/23-24:  off eliquis, and on IV heparin. remains in SR. On iv metoprolol as he is NPO  NGT in place, started to vomit around noon time, not feeling good. No obvious bleeding. Still did not pass gas and no BM, surgery is following.    Previous Cardiology Summary  TTE: 11/2020: LVEF 40%. Moderately decreased global left ventricular systolic function. Moderately reduced RV systolic function. A bioprosthetic valve is present in the mitral position. Trace mitral valve regurgitation. Moderate tricuspid regurgitation. Bioprosthesis in the aortic position. Estimated pulmonary artery systolic pressure is 40.9 mmHg assuming a right atrial pressure of 8 mmHg, which is consistent with mild pulmonary hypertension. Mitral valve mean gradient is 2.2 mmHg consistent with mild mitral stenosis.    PAST MEDICAL & SURGICAL HISTORY:  CHF (congestive heart failure)  CVA (cerebral vascular accident)  Seizures  last seizure 10 years ago  HTN (hypertension)  Hyperlipemia  COVID-19 october 2020  Atrial fibrillation  ESRD on dialysis  Tu/Thurs/Sat  Former smoker  History of cocaine use  remote hx, currently sober  H/O aortic dissection  s/p repair (2010), surgery complicated by bowel ischemia s/p bowel resection and ostomy with reversal  H/O aortic valve insufficiency  Mitral regurgitation  GIB (gastrointestinal bleeding)  CAD (coronary artery disease)  s/p PCI 1998  and CABG x 2 11/2020  H/O colectomy  Status post double vessel coronary artery bypass  11/2020 Dr Butler  S/P AVR (aortic valve replacement)  (t)AVR 11/2020 Dr Butler  S/P MVR (mitral valve replacement)  (t)MVR 11/2020 Dr Butler  H/O aortic dissection  Type A; emergent repair 2010  AV fistula  LUE    REVIEW OF SYSTEMS  General: - fever or chills  Skin/Breast: - rashes  Ophthalmologic: - blurred vision	  ENMT: - sore throat  Respiratory and Thorax: - cough  Cardiovascular: - chest pain, + palpitations, - dyspnea  Gastrointestinal:	+N/V/C  Genitourinary: - dysuria  Musculoskeletal:	 - arthritis  Neurological: - weaknesses  Psychiatric: - depression	    MEDICATIONS  (STANDING):  heparin  Infusion. 1700 Unit(s)/Hr (17 mL/Hr) IV Continuous <Continuous>  lactated ringers. 1000 milliLiter(s) (115 mL/Hr) IV Continuous <Continuous>  metoprolol tartrate Injectable 5 milliGRAM(s) IV Push every 6 hours  pantoprazole  Injectable 40 milliGRAM(s) IV Push daily    MEDICATIONS  (PRN):  acetaminophen   IVPB .. 1000 milliGRAM(s) IV Intermittent every 6 hours PRN Temp greater or equal to 38C (100.4F), Mild Pain (1 - 3), Moderate Pain (4 - 6)      Allergies  penicillin (Other)    SOCIAL HISTORY: Former smoker and cocaine use. Both stopped 2010. 1 cup coffee daily, former drinker    FAMILY HISTORY:  FH: hypertension    Family history of cardiac disorder (Mother)  mother    Vital Signs Last 24 Hrs  T(C): 36.2 (10-24-21 @ 09:05), Max: 36.9 (10-23-21 @ 23:33)  T(F): 97.2 (10-24-21 @ 09:05), Max: 98.5 (10-23-21 @ 23:33)  HR: 99 (10-24-21 @ 09:05) (75 - 99)  BP: 143/64 (10-24-21 @ 09:05) (118/58 - 161/73)  BP(mean): --  RR: 18 (10-24-21 @ 09:05) (16 - 18)  SpO2: 95% (10-24-21 @ 09:05) (95% - 98%)        Physical Exam:  Constitutional: AAOx3, NAD  Neck: supple, No JVD, +NG tube with brown secretions at bedside. Dark blood noted at nares in tube.   Cardiovascular: +S1S2 RRR, no murmurs, rubs, gallops   Pulmonary: CTA b/l, unlabored, no wheezes, rales. rhonci  Abdomen: +BS in all 4 quadrants. Tinkling sounds in left upper quadrant  Extremities: no edema b/l, +distal pulses b/l  Neuro: non focal, speech clear, CORDOVA x 4    LABS:                          13.6   8.34  )-----------( 243      ( 24 Oct 2021 09:21 )             40.7   10-24    138  |  87<L>  |  71.0<H>  ----------------------------<  108<H>  3.6   |  23.0  |  4.01<H>    Ca    9.8      24 Oct 2021 09:21  Phos  5.4     10-24  Mg     1.9     10-24    TPro  8.5  /  Alb  5.0  /  TBili  1.3  /  DBili  x   /  AST  33  /  ALT  17  /  AlkPhos  113  10-24      RADIOLOGY & ADDITIONAL STUDIES:  CT abdomen 10/23/21  IMPRESSION:  Small bowel obstruction with transition point in the left midabdomen. No evidence of bowel pneumatosis or ascites.    EKG 10/23: SR at 83bpm; PAC; QRSD 102ms

## 2021-10-24 NOTE — PROGRESS NOTE ADULT - ASSESSMENT
ASSESSMENT: Patient is a 61y old male with PMH of ESRD, MI, CVA, aortic aneurysm with aortic aneurysm repair c/b midline abdominal wound dehiscence requiring partial colectomy and ostomy with eventual reversal presenting to ED with SBO    PLAN:    - Conservative management  - Therapeutic AC by IV Hep per electrophysiology  - monitor Aptt  - monitor h/h  - NPO/IVF  - serial abdominal exams  - pain meds after exam only  - DVT ppx  - OOB/ambulate  - strict I/Os

## 2021-10-24 NOTE — PROGRESS NOTE ADULT - ASSESSMENT
61 year old male patient with a known history of psoriasis, Aortic dissection s/p emergent repair (2010), surgery complicated by CVA w/ residual left sided weakness and bowel ischemia s/p bowel resection and ostomy with reversal repair, ESRD on HD (Tu/Thur/Sat) s/p LUE AVF, HTN, seizure disorder, GIB s/p colonoscopy and AVMs cauterization 3/2021 and CAD s/p remote stent to LAD and recent CABG x 2, AI and MR s/p tAVR, tMVR (11/2020 Dr. Butler) with post-op pAfib/flutter who is s/p uncomplicated radiofrequency ablation atrial fibrillation and atrial flutter ([facility]/PVI, CTI and mitral flutter) on 9/14/21. Admitted now with SBO.     Interval Hx 10/23-24:  off eliquis, and on IV heparin. remains in SR.   NGT in place, started to vomit around noon time, not feeling good. No obvious bleeding. Still did not pass gas and no BM, surgery is following.      1. Bowl obstruction. Pt has h/o SBO and now presents with bowl obstruction, NGT in place. May need surgery, eliquis is on Hold. Still not passing gas, NGT in place. Has been vomiting for > 2 days, so unclear when was the last effective use of eliquis.   - C/W Ib heparin. This can be stopped 6 hours prior to surgery if planned and can also reversed by protamine if emergent surgery was needed.   - If no active bleeding, then advise to use full dose therapeutic IV heparin as he had recent (9/14/2021) extensive AFIB ablation and so at risk of stroke without AC. Heparin can be stopped and/or reversed if needed to go to OR. Use of AC post-op can be discussed with the surgical team after if he ended up going to surgery.    2. AFib, s/p AF ablation on 9/14/2021, remains in SR. and denies any palpitation, CP, dizziness, or SOB  - can c/w iv metoprolol as is for now, HOLD if bradycardia (HR < 50 while awake or <40 while asleep) or hypotension    will follow up    If needed cardiac clearance (other than the above clarified use of anticoagulation perioperatively) then pls consult general cardiology service.

## 2021-10-25 ENCOUNTER — TRANSCRIPTION ENCOUNTER (OUTPATIENT)
Age: 61
End: 2021-10-25

## 2021-10-25 LAB
ANION GAP SERPL CALC-SCNC: 14 MMOL/L — SIGNIFICANT CHANGE UP (ref 5–17)
ANION GAP SERPL CALC-SCNC: 16 MMOL/L — SIGNIFICANT CHANGE UP (ref 5–17)
ANION GAP SERPL CALC-SCNC: 19 MMOL/L — HIGH (ref 5–17)
APTT BLD: 56.2 SEC — HIGH (ref 27.5–35.5)
APTT BLD: >200 SEC — CRITICAL HIGH (ref 27.5–35.5)
APTT BLD: >200 SEC — CRITICAL HIGH (ref 27.5–35.5)
BUN SERPL-MCNC: 33.1 MG/DL — HIGH (ref 8–20)
BUN SERPL-MCNC: 41.5 MG/DL — HIGH (ref 8–20)
BUN SERPL-MCNC: 86.1 MG/DL — HIGH (ref 8–20)
CALCIUM SERPL-MCNC: 7 MG/DL — LOW (ref 8.6–10.2)
CALCIUM SERPL-MCNC: 9.3 MG/DL — SIGNIFICANT CHANGE UP (ref 8.6–10.2)
CALCIUM SERPL-MCNC: 9.8 MG/DL — SIGNIFICANT CHANGE UP (ref 8.6–10.2)
CHLORIDE SERPL-SCNC: 69 MMOL/L — LOW (ref 98–107)
CHLORIDE SERPL-SCNC: 83 MMOL/L — LOW (ref 98–107)
CHLORIDE SERPL-SCNC: 92 MMOL/L — LOW (ref 98–107)
CO2 SERPL-SCNC: 22 MMOL/L — SIGNIFICANT CHANGE UP (ref 22–29)
CO2 SERPL-SCNC: 30 MMOL/L — HIGH (ref 22–29)
CO2 SERPL-SCNC: 34 MMOL/L — HIGH (ref 22–29)
CREAT SERPL-MCNC: 2.7 MG/DL — HIGH (ref 0.5–1.3)
CREAT SERPL-MCNC: 3.71 MG/DL — HIGH (ref 0.5–1.3)
CREAT SERPL-MCNC: 4.37 MG/DL — HIGH (ref 0.5–1.3)
GLUCOSE SERPL-MCNC: 112 MG/DL — HIGH (ref 70–99)
GLUCOSE SERPL-MCNC: 123 MG/DL — HIGH (ref 70–99)
GLUCOSE SERPL-MCNC: 128 MG/DL — HIGH (ref 70–99)
HCT VFR BLD CALC: 40.2 % — SIGNIFICANT CHANGE UP (ref 39–50)
HGB BLD-MCNC: 13.6 G/DL — SIGNIFICANT CHANGE UP (ref 13–17)
INR BLD: 1.4 RATIO — HIGH (ref 0.88–1.16)
MAGNESIUM SERPL-MCNC: 2 MG/DL — SIGNIFICANT CHANGE UP (ref 1.8–2.6)
MAGNESIUM SERPL-MCNC: 2.3 MG/DL — SIGNIFICANT CHANGE UP (ref 1.8–2.6)
MCHC RBC-ENTMCNC: 30 PG — SIGNIFICANT CHANGE UP (ref 27–34)
MCHC RBC-ENTMCNC: 33.8 GM/DL — SIGNIFICANT CHANGE UP (ref 32–36)
MCV RBC AUTO: 88.7 FL — SIGNIFICANT CHANGE UP (ref 80–100)
MRSA PCR RESULT.: SIGNIFICANT CHANGE UP
PHOSPHATE SERPL-MCNC: 5.1 MG/DL — HIGH (ref 2.4–4.7)
PHOSPHATE SERPL-MCNC: 5.3 MG/DL — HIGH (ref 2.4–4.7)
PLATELET # BLD AUTO: 252 K/UL — SIGNIFICANT CHANGE UP (ref 150–400)
POTASSIUM SERPL-MCNC: 2.8 MMOL/L — CRITICAL LOW (ref 3.5–5.3)
POTASSIUM SERPL-MCNC: 2.8 MMOL/L — CRITICAL LOW (ref 3.5–5.3)
POTASSIUM SERPL-MCNC: 3.6 MMOL/L — SIGNIFICANT CHANGE UP (ref 3.5–5.3)
POTASSIUM SERPL-SCNC: 2.8 MMOL/L — CRITICAL LOW (ref 3.5–5.3)
POTASSIUM SERPL-SCNC: 2.8 MMOL/L — CRITICAL LOW (ref 3.5–5.3)
POTASSIUM SERPL-SCNC: 3.6 MMOL/L — SIGNIFICANT CHANGE UP (ref 3.5–5.3)
PROTHROM AB SERPL-ACNC: 16 SEC — HIGH (ref 10.6–13.6)
RBC # BLD: 4.53 M/UL — SIGNIFICANT CHANGE UP (ref 4.2–5.8)
RBC # FLD: 14 % — SIGNIFICANT CHANGE UP (ref 10.3–14.5)
S AUREUS DNA NOSE QL NAA+PROBE: DETECTED
SARS-COV-2 RNA SPEC QL NAA+PROBE: SIGNIFICANT CHANGE UP
SODIUM SERPL-SCNC: 105 MMOL/L — CRITICAL LOW (ref 135–145)
SODIUM SERPL-SCNC: 136 MMOL/L — SIGNIFICANT CHANGE UP (ref 135–145)
SODIUM SERPL-SCNC: 138 MMOL/L — SIGNIFICANT CHANGE UP (ref 135–145)
WBC # BLD: 10.68 K/UL — HIGH (ref 3.8–10.5)
WBC # FLD AUTO: 10.68 K/UL — HIGH (ref 3.8–10.5)

## 2021-10-25 PROCEDURE — 76937 US GUIDE VASCULAR ACCESS: CPT | Mod: 26

## 2021-10-25 PROCEDURE — 36556 INSERT NON-TUNNEL CV CATH: CPT

## 2021-10-25 PROCEDURE — 99232 SBSQ HOSP IP/OBS MODERATE 35: CPT

## 2021-10-25 PROCEDURE — 74018 RADEX ABDOMEN 1 VIEW: CPT | Mod: 26

## 2021-10-25 PROCEDURE — 90937 HEMODIALYSIS REPEATED EVAL: CPT

## 2021-10-25 PROCEDURE — 76942 ECHO GUIDE FOR BIOPSY: CPT | Mod: 26,59

## 2021-10-25 RX ORDER — HYDROMORPHONE HYDROCHLORIDE 2 MG/ML
1 INJECTION INTRAMUSCULAR; INTRAVENOUS; SUBCUTANEOUS ONCE
Refills: 0 | Status: DISCONTINUED | OUTPATIENT
Start: 2021-10-25 | End: 2021-10-25

## 2021-10-25 RX ORDER — ONDANSETRON 8 MG/1
4 TABLET, FILM COATED ORAL EVERY 6 HOURS
Refills: 0 | Status: DISCONTINUED | OUTPATIENT
Start: 2021-10-25 | End: 2021-10-26

## 2021-10-25 RX ORDER — ONDANSETRON 8 MG/1
4 TABLET, FILM COATED ORAL ONCE
Refills: 0 | Status: COMPLETED | OUTPATIENT
Start: 2021-10-25 | End: 2021-10-25

## 2021-10-25 RX ORDER — POTASSIUM CHLORIDE 20 MEQ
10 PACKET (EA) ORAL
Refills: 0 | Status: COMPLETED | OUTPATIENT
Start: 2021-10-25 | End: 2021-10-25

## 2021-10-25 RX ORDER — HYDROMORPHONE HYDROCHLORIDE 2 MG/ML
0.5 INJECTION INTRAMUSCULAR; INTRAVENOUS; SUBCUTANEOUS EVERY 4 HOURS
Refills: 0 | Status: DISCONTINUED | OUTPATIENT
Start: 2021-10-25 | End: 2021-10-26

## 2021-10-25 RX ORDER — ACETAMINOPHEN 500 MG
1000 TABLET ORAL ONCE
Refills: 0 | Status: COMPLETED | OUTPATIENT
Start: 2021-10-25 | End: 2021-10-25

## 2021-10-25 RX ADMIN — ONDANSETRON 4 MILLIGRAM(S): 8 TABLET, FILM COATED ORAL at 09:02

## 2021-10-25 RX ADMIN — Medication 1000 MILLIGRAM(S): at 09:12

## 2021-10-25 RX ADMIN — HYDROMORPHONE HYDROCHLORIDE 1 MILLIGRAM(S): 2 INJECTION INTRAMUSCULAR; INTRAVENOUS; SUBCUTANEOUS at 22:47

## 2021-10-25 RX ADMIN — HYDROMORPHONE HYDROCHLORIDE 1 MILLIGRAM(S): 2 INJECTION INTRAMUSCULAR; INTRAVENOUS; SUBCUTANEOUS at 22:09

## 2021-10-25 RX ADMIN — Medication 1000 MILLIGRAM(S): at 22:47

## 2021-10-25 RX ADMIN — HEPARIN SODIUM 1400 UNIT(S)/HR: 5000 INJECTION INTRAVENOUS; SUBCUTANEOUS at 00:37

## 2021-10-25 RX ADMIN — Medication 100 MILLIEQUIVALENT(S): at 17:30

## 2021-10-25 RX ADMIN — HYDROMORPHONE HYDROCHLORIDE 1 MILLIGRAM(S): 2 INJECTION INTRAMUSCULAR; INTRAVENOUS; SUBCUTANEOUS at 13:00

## 2021-10-25 RX ADMIN — HEPARIN SODIUM 0 UNIT(S)/HR: 5000 INJECTION INTRAVENOUS; SUBCUTANEOUS at 23:19

## 2021-10-25 RX ADMIN — HEPARIN SODIUM 1600 UNIT(S)/HR: 5000 INJECTION INTRAVENOUS; SUBCUTANEOUS at 12:17

## 2021-10-25 RX ADMIN — Medication 1000 MILLIGRAM(S): at 01:21

## 2021-10-25 RX ADMIN — Medication 400 MILLIGRAM(S): at 09:02

## 2021-10-25 RX ADMIN — Medication 100 MILLIEQUIVALENT(S): at 18:34

## 2021-10-25 RX ADMIN — SODIUM CHLORIDE 115 MILLILITER(S): 9 INJECTION, SOLUTION INTRAVENOUS at 16:19

## 2021-10-25 RX ADMIN — Medication 100 MILLIEQUIVALENT(S): at 16:20

## 2021-10-25 RX ADMIN — Medication 400 MILLIGRAM(S): at 21:35

## 2021-10-25 RX ADMIN — Medication 5 MILLIGRAM(S): at 03:55

## 2021-10-25 RX ADMIN — PANTOPRAZOLE SODIUM 40 MILLIGRAM(S): 20 TABLET, DELAYED RELEASE ORAL at 16:20

## 2021-10-25 RX ADMIN — SODIUM CHLORIDE 115 MILLILITER(S): 9 INJECTION, SOLUTION INTRAVENOUS at 00:39

## 2021-10-25 RX ADMIN — ONDANSETRON 4 MILLIGRAM(S): 8 TABLET, FILM COATED ORAL at 16:19

## 2021-10-25 RX ADMIN — Medication 5 MILLIGRAM(S): at 09:01

## 2021-10-25 RX ADMIN — SODIUM CHLORIDE 115 MILLILITER(S): 9 INJECTION, SOLUTION INTRAVENOUS at 21:07

## 2021-10-25 RX ADMIN — HYDROMORPHONE HYDROCHLORIDE 1 MILLIGRAM(S): 2 INJECTION INTRAMUSCULAR; INTRAVENOUS; SUBCUTANEOUS at 12:32

## 2021-10-25 RX ADMIN — Medication 5 MILLIGRAM(S): at 21:06

## 2021-10-25 NOTE — PATIENT PROFILE ADULT - NSPROPTRIGHTREPPHONE_GEN_A_NUR
-- DO NOT REPLY / DO NOT REPLY ALL --  -- Message is from the [facility]--    Patient is requesting a medication refill - medication is on active medication list    Patient is currently OUT of the requested medication.    Was Medication Pended?  Yes.    Rx Name and Dose:  mesalamine (PENTASA) 500 MG CR capsule    Duration: 30 days    Pharmacy  [id number] - [place], [place] - [address] [facility]    Patient confirmed the above pharmacy as correct?  Yes    Caller Information       Type Contact Phone    09/29/2020 03:04 PM CDT Phone (Incoming) Abraham Menjivar (Self) [phone] (H)          Turnaround time given to caller:   \"This message will be sent to [state Provider's name]. The clinical team will fulfill your request as soon as they review your message.\"   [phone]

## 2021-10-25 NOTE — PROGRESS NOTE ADULT - ASSESSMENT
Patient was seen and evaluated on dialysis.   Patient is tolerating the procedure well.   T(C): 36.6 (10-25-21 @ 11:04), Max: 37 (10-24-21 @ 16:56)  HR: 85 (10-25-21 @ 11:04) (79 - 90)  BP: 116/69 (10-25-21 @ 11:04) (116/69 - 163/68)  Continue dialysis: As Scheduled,   Dialyzer:  Revaclear 300        QB:  400 ml.,   ( L - Forearm AVG )     QD: 500ml.,  Goal UF  0 ml.,  over  2  Hours     Pt is seen and examined on dialysis. No symptoms. Hemodynamics stable. Tolerating dialysis and ultrafiltration.  Pre Laboratory values personally reviewed by me.  Dialysis adjusted appropriately based on current values.  Will continue the current medical management.    Given Dilaudid 1 mg., IV for Pain X 1  ( During HD )   Discussed with nursing, primary care team.

## 2021-10-25 NOTE — CONSULT NOTE ADULT - ATTENDING COMMENTS
62 y/o M admitted with SBO   Planned for OR; cardiology was consulted for perioperative risk stratification  Elevated risk for procedure; 11% risk of MACE - however, there is no absolute cardiac contraindication and patient is optimized from cardiac perspective  Continue beta blockers mirna operatively  Restart ASA, statin once safe from GI/surgical perspective   EP following for management of AF     Thank you for allowing me to participate in the care of this patient  Please call us back with any further questions

## 2021-10-25 NOTE — PROGRESS NOTE ADULT - SUBJECTIVE AND OBJECTIVE BOX
Patient was seen and evaluated on dialysis.   No c/o CP SOB, NG in place.  no F/C  no swelling    T(C): 37.3 (10-26-21 @ 04:42), Max: 37.3 (10-26-21 @ 04:42)  HR: 71 (10-26-21 @ 04:42) (71 - 88)  BP: 175/68 (10-26-21 @ 04:42) (116/69 - 195/74)  Wt(kg): --NA  PE ;  C/O Abdominal Pain, in Mild Distresss, Pale, Uncomfortable,   lungs - CTA  CV gr 1 murmur,  No gallop or rub  Abd : soft, NT BS , No masses  Ext- No edema  Neuro : Grossly intact, moving extremities                        13.6   10.68 )-----------( 252      ( 25 Oct 2021 11:20 )             40.2        10-25    138  |  92<L>  |  41.5<H>  ----------------------------<  128<H>  3.6   |  30.0<H>  |  3.71<H>    Ca    9.3      25 Oct 2021 22:39  Phos  5.1     10-25  Mg     2.0     10-25    TPro  8.5  /  Alb  5.0  /  TBili  1.3  /  DBili  x   /  AST  33  /  ALT  17  /  AlkPhos  113  10-24      MEDICATIONS  (STANDING):  heparin  Infusion.  HYDROmorphone  Injectable PRN  lactated ringers.  metoprolol tartrate Injectable  ondansetron Injectable PRN  pantoprazole  Injectable      Patient stable  Serg HD easily  Continue HD M W F,

## 2021-10-25 NOTE — PROGRESS NOTE ADULT - ASSESSMENT
Patient with signsand symptoms of SBO who fail gastrografin challenge. vital signs   demonstrate him hemodinamically normal in the last 24 hours , afebrile.  Plan   Continue NPO  Consider OR today due to failure of conservative management

## 2021-10-25 NOTE — PROGRESS NOTE ADULT - SUBJECTIVE AND OBJECTIVE BOX
Pt is a 61 year old male scheduled for a Diagnostic laparoscopy, possible small bowel resection.    61 year old male patient with a known history of psoriasis, former cocaine abuse (quit 2010), Aortic dissection s/p emergent repair (2010), surgery complicated by CVA w/ residual left sided weakness and bowel ischemia s/p bowel resection and ostomy with reversal repair, Covid infection, ESRD on HD (Tu/Thur/Sat) s/p LUE AVF, HTN, seizure disorder, GIB s/p colonoscopy and AVMs cauterization 3/2021 and CAD s/p remote stent to LAD and recent CABG x 2 (DGV-OM, RPDA with Dr. Butler 11/2020), AI and MR s/p tAVR, tMVR (11/2020 Dr. Butler) with post-op pAfib/flutter (on Eliquis HFrEF (EF 45-50% 02/21), and s/p uncomplicated radiofrequency ablation atrial fibrillation and atrial flutter (W[facility]/PVI, CTI and mitral flutter) on 9/14/21.       Jared: EF 45% no AS.     ALL: PCN.     cardiology evaluation in chart. patient at increased risk for moderate risk surgery. pt is off eliquis.     H/H - 13/40  PLT- 252 k    spoke to AGUSTO willoughyb. regarding todays potassium  of 2.8. pr received HD today and is actively receiving potassium supplements. OGT draining as well.      ASA 4    type and cross.   General anesthesia.    Dr. Naman Blakely

## 2021-10-25 NOTE — PROGRESS NOTE ADULT - SUBJECTIVE AND OBJECTIVE BOX
61y Male with small bowel obstruction, he has not passed gasses, he has vomites,  currently with NGT to intermitent suction , he fail the gastrografin challenge .  he currently complaints of generalized abdominal pain.  Yesterday his NGT came out twice and had to be place again     Diet, NPO (10-23-21 @ 05:17)    Scheduled Medications:     heparin  Infusion. 1700 Unit(s)/Hr (17 mL/Hr) IV Continuous <Continuous>  lactated ringers. 1000 milliLiter(s) (115 mL/Hr) IV Continuous <Continuous>  metoprolol tartrate Injectable 5 milliGRAM(s) IV Push every 6 hours  pantoprazole  Injectable 40 milliGRAM(s) IV Push daily    PRN Medications:    Objective:   T(F): 97.6 (10-24 @ 23:09), Max: 98.6 (10-24 @ 16:56)  HR: 90 (10-24 @ 23:09) (75 - 99)  BP: 119/69 (10-24 @ 23:09) (118/58 - 143/64)  RR: 18 (10-24 @ 23:09) (16 - 18)  SpO2: 95% (10-24 @ 23:09) (95% - 97%)    Physical Exam:   GEN: patient liying in bed, complaining of generalized abdominal pain , NGT tube to suction   RESP: respirations are unlabored, no accessory muscle use, no conversational dyspnea  CVS: RRR  GI: Abdomen swith multiple scars ( lapatoromies) mildlty distension , pain to palpation in lower and upper abdomen, voluntary guarding , no involuntaty guarding , no overt peritoneal signs   extremities no edema     I&O's    10-23 @ 07:01  -  10-24 @ 07:00  --------------------------------------------------------  IN:    Heparin Infusion: 17 mL    Lactated Ringers: 575 mL  Total IN: 592 mL    OUT:    Nasogastric/Oral tube (mL): 1100 mL  Total OUT: 1100 mL    Total NET: -508 mL      10-24 @ 07:01  -  10-25 @ 01:28  --------------------------------------------------------  IN:    Heparin Infusion: 204 mL    IV PiggyBack: 200 mL    Lactated Ringers: 1376 mL  Total IN: 1780 mL    OUT:    Nasogastric/Oral tube (mL): 2600 mL  Total OUT: 2600 mL    Total NET: 180 mL    LABS:                        13.6   8.34  )-----------( 243      ( 24 Oct 2021 09:21 )             40.7     10-24    138  |  87<L>  |  71.0<H>  ----------------------------<  108<H>  3.6   |  23.0  |  4.01<H>    Ca    9.8      24 Oct 2021 09:21  Phos  5.4     10-24  Mg     1.9     10-24    TPro  8.5  /  Alb  5.0  /  TBili  1.3  /  DBili  x   /  AST  33  /  ALT  17  /  AlkPhos  113  10-24    PT/INR - ( 24 Oct 2021 09:21 )   PT: 18.1 sec;   INR: 1.60 ratio      PTT - ( 24 Oct 2021 22:48 )  PTT:168.8 sec    Patient with signs and symptoms of SBO who fail gastrografin challenge.     vital signs   demonstrate him hemodynamically normal in the last 24 hours , afebrile.    Plan :     NPO, IVF Modified,     Awaits Surgery,

## 2021-10-25 NOTE — PROGRESS NOTE ADULT - SUBJECTIVE AND OBJECTIVE BOX
Acute Care Surgery  61y Male with small bowel obstruction, he has not passed gasses, he has vomites,  currently with NGT to intermitent suction , he fail the gastrografin challenge .  he currently complaints of generalized abdominal pain.  Yesterday his NGT came out twice and had to be place again     Diet, NPO (10-23-21 @ 05:17)      Scheduled Medications:   heparin  Infusion. 1700 Unit(s)/Hr (17 mL/Hr) IV Continuous <Continuous>  lactated ringers. 1000 milliLiter(s) (115 mL/Hr) IV Continuous <Continuous>  metoprolol tartrate Injectable 5 milliGRAM(s) IV Push every 6 hours  pantoprazole  Injectable 40 milliGRAM(s) IV Push daily    PRN Medications:      Objective:   T(F): 97.6 (10-24 @ 23:09), Max: 98.6 (10-24 @ 16:56)  HR: 90 (10-24 @ 23:09) (75 - 99)  BP: 119/69 (10-24 @ 23:09) (118/58 - 143/64)  RR: 18 (10-24 @ 23:09) (16 - 18)  SpO2: 95% (10-24 @ 23:09) (95% - 97%)      Physical Exam:   GEN: patient liying in bed, complaining of generalized abdominal pain , NGT tube to suction   RESP: respirations are unlabored, no accessory muscle use, no conversational dyspnea  CVS: RRR  GI: Abdomen swith multiple scars ( lapatoromies) mildlty distension , pain to palpation in lower and upper abdomen, voluntary guarding , no involuntaty guarding , no overt peritoneal signs   extremities no edema     I&O's    10-23 @ 07:01  -  10-24 @ 07:00  --------------------------------------------------------  IN:    Heparin Infusion: 17 mL    Lactated Ringers: 575 mL  Total IN: 592 mL    OUT:    Nasogastric/Oral tube (mL): 1100 mL  Total OUT: 1100 mL    Total NET: -508 mL      10-24 @ 07:01  -  10-25 @ 01:28  --------------------------------------------------------  IN:    Heparin Infusion: 204 mL    IV PiggyBack: 200 mL    Lactated Ringers: 1376 mL  Total IN: 1780 mL    OUT:    Nasogastric/Oral tube (mL): 2600 mL  Total OUT: 2600 mL    Total NET: 180 mL          LABS:                        13.6   8.34  )-----------( 243      ( 24 Oct 2021 09:21 )             40.7     10-24    138  |  87<L>  |  71.0<H>  ----------------------------<  108<H>  3.6   |  23.0  |  4.01<H>    Ca    9.8      24 Oct 2021 09:21  Phos  5.4     10-24  Mg     1.9     10-24    TPro  8.5  /  Alb  5.0  /  TBili  1.3  /  DBili  x   /  AST  33  /  ALT  17  /  AlkPhos  113  10-24    PT/INR - ( 24 Oct 2021 09:21 )   PT: 18.1 sec;   INR: 1.60 ratio         PTT - ( 24 Oct 2021 22:48 )  PTT:168.8 sec           Detail Level: Simple Detail Level: Detailed

## 2021-10-25 NOTE — CONSULT NOTE ADULT - SUBJECTIVE AND OBJECTIVE BOX
[facility]                                                               Office:  [address] [address]                                                              Telephone: [phone]. Fax:[phone]                                                                        CARDIOLOGY CONSULTATION NOTE                                                                                             Consult requested by:  Dr. Mcmahan  Reason for Consultation:  Natali-operative Cardiac Risk Stratification   Primary Cardiologist: Dr. Frankel  History obtained by: Patient and medical record   obtained: No    Covid Status: Negative 10/23/21    Chief complaint:    Patient is a 61y old  Male who presents with a chief complaint of SBO (25 Oct 2021 01:28)    HPI: 61 year old male patient with a known history of psoriasis, former cocaine abuse (quit 2010), Aortic dissection s/p emergent repair (2010), surgery complicated by CVA w/ residual left sided weakness and bowel ischemia s/p bowel resection and ostomy with reversal repair, Covid infection, ESRD on HD (Tu/Thur/Sat) s/p LUE AVF, HTN, seizure disorder, GIB s/p colonoscopy and AVMs cauterization 3/2021 and CAD s/p remote stent to LAD and recent CABG x 2 (DGV-OM, RPDA with Dr. Butler 11/2020), AI and MR s/p tAVR, tMVR (11/2020 Dr. Butler) with post-op pAfib/flutter (on Eliquis HFrEF (EF 45-50% 02/21), and s/p uncomplicated radiofrequency ablation atrial fibrillation and atrial flutter (WACA/PVI, CTI and mitral flutter) on 9/14/21. Patient presents this admission with two days of constipation and worsening abdominal discomfort associated with nausea and vomiting. He reports these symptoms started suddenly. He admits that after meals he wasn't able to keep them down and would throw up his pills as well for these last two days. He has a history of these types of events since his abdominal surgery which are self limiting. Late last night he does admit to brief palpitations. No complaints of chest pain, dyspnea, fever or chills.    Appreciate above by Dr. Virk.     Patient is now possibly going to the OR for small bowel obstruction. Patient currently still nauseous with emesis and abdominal pain despite having NG tube. Patient states he had been taking all of his medications as prescribed, and has had no chest pain or dyspnea. Patient denies fevers, chills, CP, SOB, headache, or dizziness.     REVIEW OF SYMPTOMS:     CONSTITUTIONAL: No fever, weight loss, or fatigue  ENMT:  No difficulty hearing, tinnitus, vertigo; No sinus or throat pain  NECK: No pain or stiffness  CARDIOVASCULAR: AS PER HPI  RESPIRATORY: AS PER HPI  : No dysuria, no hematuria   GI: AS PER HPI  NEURO: No headache, no dizziness, no slurred speech   MUSCULOSKELETAL: No joint pain or swelling; No muscle, back, or extremity pain  PSYCH: No agitation, no anxiety.    ALL OTHER REVIEW OF SYSTEMS ARE NEGATIVE.      PREVIOUS DIAGNOSTIC TESTING  ECHO FINDINGS:  < from: MUSTAPHA Echo Doppler (09.14.21 @ 11:52) >  PHYSICIAN INTERPRETATION:  Left Ventricle:  Global LV systolic function was mildly decreased. Left ventricular ejection fraction, by visual estimation, is 45 to 50%. Abnormal (paradoxical) septal motion consistent with post-operative status. Elevated mean left atrial pressure.  Right Ventricle: The right ventricular size is normal. RV systolic function is moderately reduced.  Left Atrium: Severely enlarged left atrium. No left atrial appendage thrombus is seen.  Right Atrium: Right atrial enlargement.  Pericardium: Trivial pericardial effusion is present.  Mitral Valve: There is a well seated mitral valve prosthesis, moderate para-valvular leak. Mean transmitral gradient of 4.3 mmHg at Hr of 62 BPM.  Tricuspid Valve: Mild tricuspid regurgitation is visualized.  Aortic Valve: The aortic valve is a normally functioning bioprosthetic valve.  Pulmonic Valve: Structurally normal pulmonic valve, with normal leaflet excursion.  Aorta: Synthetic graft is seen in the ascending aorta. There is a known type B dissection with a large and thrombosed false lumen and a smaller true lumen.  Venous: A systolic blunting flow pattern is recorded from all four pulmonary veins.      Summary:   1. Technically good study.   2. Mildly decreased global left ventricular systolic function.   3. Left ventricular ejection fraction, by visual estimation, is 45 to 50%.   4. Severely enlarged left atrium.   5. Abnormal septal motion consistent with post-operative status.   6. Elevated mean left atrial pressure.   7. Right atrial enlargement.   8. No left atrial appendage thrombus.   9. Moderately reduced RV systolic function.  10. There is a well seated mitral valve prosthesis, moderate para-valvular leak. Mean transmitral gradient of 4.3 mmHg at Hr of 62 BPM.  11. Mild tricuspid regurgitation.  12. Aortic valve is normally functioning bioprosthetic valve.  13. Synthetic graft isseen in the ascending aorta. There is a known type B dissection with a large and thrombosed false lumen and a smaller true lumen.  14. Trivial pericardial effusion.    MD Gennaro Electronically signed on 9/14/2021 at 1:01:40 PM    < end of copied text >      ALLERGIES: Allergies    penicillin (Other)    Intolerances      PAST MEDICAL HISTORY  CHF (congestive heart failure)    CVA (cerebral vascular accident)    Chronic kidney disease    Seizures    HTN (hypertension)    Hyperlipemia    COVID-19    Atrial fibrillation    ESRD on dialysis    Former smoker    History of cocaine use    H/O aortic dissection    H/O aortic valve insufficiency    Mitral regurgitation    GIB (gastrointestinal bleeding)    CAD (coronary artery disease)        PAST SURGICAL HISTORY  Aorta disorder    H/O colectomy    Status post double vessel coronary artery bypass    S/P AVR (aortic valve replacement)    S/P MVR (mitral valve replacement)    H/O aortic dissection    AV fistula        FAMILY HISTORY:  FH: hypertension    Family history of cardiac disorder (Mother)  mother        SOCIAL HISTORY:    CIGARETTES: Former smoker     ALCOHOL: Denies  DRUGS: Former cocaine use, stopped in 2010.       CURRENT MEDICATIONS:  metoprolol tartrate Injectable 5 milliGRAM(s) IV Push every 6 hours     pantoprazole  Injectable  heparin  Infusion.  lactated ringers.      HOME MEDICATIONS:  Home Medications:  ascorbic acid 500 mg oral tablet: 1 tab(s) orally once a day (14 Sep 2021 10:29)  atorvastatin 40 mg oral tablet: 1 tab(s) orally once a day (at bedtime) (14 Sep 2021 10:29)  Eliquis 5 mg oral tablet: 1  orally once a day (14 Sep 2021 10:29)  gabapentin 100 mg oral capsule: 1 cap(s) orally once a day (at bedtime) (14 Sep 2021 10:29)  levETIRAcetam 750 mg oral tablet: 1 tab(s) orally 2 times a day (14 Sep 2021 10:29)  sevelamer hydrochloride 800 mg oral tablet: 1 tab(s) orally 2 times a day (with meals) (14 Sep 2021 10:29)  Tab-A-Russel oral tablet: 1 tab(s) orally once a day (14 Sep 2021 10:29)      Vital Signs Last 24 Hrs  T(C): 36.6 (25 Oct 2021 11:04), Max: 37 (24 Oct 2021 16:56)  T(F): 97.8 (25 Oct 2021 11:04), Max: 98.6 (24 Oct 2021 16:56)  HR: 85 (25 Oct 2021 11:04) (79 - 90)  BP: 116/69 (25 Oct 2021 11:04) (116/69 - 163/68)  RR: 18 (25 Oct 2021 11:04) (18 - 18)  SpO2: 100% (25 Oct 2021 11:04) (95% - 100%)      PHYSICAL EXAM:  Constitutional: In distress  HEENT: Atraumatic and normocephalic , neck is supple . no JVD. No carotid bruit. PEERL +NGT  CNS: A&Ox3. No focal deficits. EOMI. Cranial nerves II-IX are intact.   Lymph Nodes: Cervical : Not palpable.  Respiratory: CTAB  Cardiovascular: S1S2 RRR. No murmur/rubs or gallop.  Gastrointestinal: Soft non-tender and non distended . +Bowel sounds. negative Garcia's sign.  Extremities: No edema.   Psychiatric: Calm . no agitation.  Skin: No skin rash/ulcers visualized to face, hands or feet.    Intake and output:   10-24 @ 07:01  -  10-25 @ 07:00  --------------------------------------------------------  IN: 2978 mL / OUT: 3300 mL / NET: -322 mL        LABS:                        13.6   10.68 )-----------( 252      ( 25 Oct 2021 11:20 )             40.2     10-25    136  |  83<L>  |  86.1<H>  ----------------------------<  112<H>  2.8<LL>   |  34.0<H>  |  4.37<H>    Ca    9.8      25 Oct 2021 11:20  Phos  5.3     10-25  Mg     2.3     10-25    TPro  8.5  /  Alb  5.0  /  TBili  1.3  /  DBili  x   /  AST  33  /  ALT  17  /  AlkPhos  113  10-24      ;p-BNP=  PT/INR - ( 24 Oct 2021 09:21 )   PT: 18.1 sec;   INR: 1.60 ratio         PTT - ( 25 Oct 2021 11:20 )  PTT:56.2 sec      INTERPRETATION OF TELEMETRY: Reviewed by me. SR, ST, PVCs, trigeminy, bigeminy, AIVR, 5 beats of NSVT  ECG: Reviewed by me. SR, PACs, LVH    RADIOLOGY & ADDITIONAL STUDIES:    X-ray:  reviewed by me.   < from: Xray Chest 1 View- PORTABLE-Urgent (Xray Chest 1 View- PORTABLE-Urgent .) (10.23.21 @ 05:18) >   EXAM:  XR CHEST PORTABLE URGENT 1V                          PROCEDURE DATE:  10/23/2021          INTERPRETATION:  AP chest.    Clinical indications: NG tube placement.    IMPRESSION: There is an NG tube with its tip in the stomach. The patient is status post median sternotomy and valve replacement. The lungs are clear. The heart is normal in size.    < end of copied text >    CT scan:   < from: CT Abdomen and Pelvis w/ Oral Cont (10.23.21 @ 01:40) >  FINDINGS:  LOWER CHEST: Subsegmental atelectasis at the lung bases. Degenerative changes. Median sternotomy.    LIVER: Within normal limits.  BILE DUCTS: Normal caliber.  GALLBLADDER: Cholelithiasis.  SPLEEN: Within normal limits.  PANCREAS: Within normal limits.  ADRENALS: Within normal limits.  KIDNEYS/URETERS: Within normal limits.    BLADDER: Within normal limits.  REPRODUCTIVE ORGANS: Prostate within normal limits.    BOWEL: Dilated loops of small bowel in the left abdomen, with transition point on series 5, image 61. Appendix not visualized. Anastomotic suture line at the rectum.  PERITONEUM: No ascites.  VESSELS: Chronic dissection of the aorta again demonstrated.  RETROPERITONEUM/LYMPH NODES: No lymphadenopathy.  ABDOMINAL WALL: Within normal limits.  BONES: Degenerative changes.    IMPRESSION:  Small bowel obstruction with transition point in the left midabdomen. No evidence of bowel pneumatosis or ascites.    < end of copied text >    MRI:

## 2021-10-26 ENCOUNTER — RESULT REVIEW (OUTPATIENT)
Age: 61
End: 2021-10-26

## 2021-10-26 LAB
ANION GAP SERPL CALC-SCNC: 18 MMOL/L — HIGH (ref 5–17)
ANION GAP SERPL CALC-SCNC: 19 MMOL/L — HIGH (ref 5–17)
APTT BLD: 29.7 SEC — SIGNIFICANT CHANGE UP (ref 27.5–35.5)
BASOPHILS # BLD AUTO: 0.05 K/UL — SIGNIFICANT CHANGE UP (ref 0–0.2)
BASOPHILS # BLD AUTO: 0.06 K/UL — SIGNIFICANT CHANGE UP (ref 0–0.2)
BASOPHILS NFR BLD AUTO: 0.4 % — SIGNIFICANT CHANGE UP (ref 0–2)
BASOPHILS NFR BLD AUTO: 0.5 % — SIGNIFICANT CHANGE UP (ref 0–2)
BUN SERPL-MCNC: 62.1 MG/DL — HIGH (ref 8–20)
BUN SERPL-MCNC: 62.2 MG/DL — HIGH (ref 8–20)
CALCIUM SERPL-MCNC: 8.1 MG/DL — LOW (ref 8.6–10.2)
CALCIUM SERPL-MCNC: 8.6 MG/DL — SIGNIFICANT CHANGE UP (ref 8.6–10.2)
CHLORIDE SERPL-SCNC: 96 MMOL/L — LOW (ref 98–107)
CHLORIDE SERPL-SCNC: 96 MMOL/L — LOW (ref 98–107)
CO2 SERPL-SCNC: 25 MMOL/L — SIGNIFICANT CHANGE UP (ref 22–29)
CO2 SERPL-SCNC: 27 MMOL/L — SIGNIFICANT CHANGE UP (ref 22–29)
CREAT SERPL-MCNC: 4.02 MG/DL — HIGH (ref 0.5–1.3)
CREAT SERPL-MCNC: 4.58 MG/DL — HIGH (ref 0.5–1.3)
EOSINOPHIL # BLD AUTO: 0.03 K/UL — SIGNIFICANT CHANGE UP (ref 0–0.5)
EOSINOPHIL # BLD AUTO: 0.32 K/UL — SIGNIFICANT CHANGE UP (ref 0–0.5)
EOSINOPHIL NFR BLD AUTO: 0.2 % — SIGNIFICANT CHANGE UP (ref 0–6)
EOSINOPHIL NFR BLD AUTO: 2.9 % — SIGNIFICANT CHANGE UP (ref 0–6)
GLUCOSE SERPL-MCNC: 114 MG/DL — HIGH (ref 70–99)
GLUCOSE SERPL-MCNC: 127 MG/DL — HIGH (ref 70–99)
HBV SURFACE AB SER-ACNC: 322.1 MIU/ML — SIGNIFICANT CHANGE UP
HBV SURFACE AG SER-ACNC: SIGNIFICANT CHANGE UP
HCT VFR BLD CALC: 37.9 % — LOW (ref 39–50)
HCT VFR BLD CALC: 38.5 % — LOW (ref 39–50)
HCV AB S/CO SERPL IA: 0.15 S/CO — SIGNIFICANT CHANGE UP (ref 0–0.99)
HCV AB SERPL-IMP: SIGNIFICANT CHANGE UP
HGB BLD-MCNC: 12.3 G/DL — LOW (ref 13–17)
HGB BLD-MCNC: 12.4 G/DL — LOW (ref 13–17)
IMM GRANULOCYTES NFR BLD AUTO: 0.4 % — SIGNIFICANT CHANGE UP (ref 0–1.5)
IMM GRANULOCYTES NFR BLD AUTO: 0.6 % — SIGNIFICANT CHANGE UP (ref 0–1.5)
INR BLD: 1.22 RATIO — HIGH (ref 0.88–1.16)
LYMPHOCYTES # BLD AUTO: 0.5 K/UL — LOW (ref 1–3.3)
LYMPHOCYTES # BLD AUTO: 1.44 K/UL — SIGNIFICANT CHANGE UP (ref 1–3.3)
LYMPHOCYTES # BLD AUTO: 12.9 % — LOW (ref 13–44)
LYMPHOCYTES # BLD AUTO: 4 % — LOW (ref 13–44)
MAGNESIUM SERPL-MCNC: 1.9 MG/DL — SIGNIFICANT CHANGE UP (ref 1.6–2.6)
MCHC RBC-ENTMCNC: 30.1 PG — SIGNIFICANT CHANGE UP (ref 27–34)
MCHC RBC-ENTMCNC: 30.1 PG — SIGNIFICANT CHANGE UP (ref 27–34)
MCHC RBC-ENTMCNC: 32.2 GM/DL — SIGNIFICANT CHANGE UP (ref 32–36)
MCHC RBC-ENTMCNC: 32.5 GM/DL — SIGNIFICANT CHANGE UP (ref 32–36)
MCV RBC AUTO: 92.7 FL — SIGNIFICANT CHANGE UP (ref 80–100)
MCV RBC AUTO: 93.4 FL — SIGNIFICANT CHANGE UP (ref 80–100)
MONOCYTES # BLD AUTO: 1.18 K/UL — HIGH (ref 0–0.9)
MONOCYTES # BLD AUTO: 1.28 K/UL — HIGH (ref 0–0.9)
MONOCYTES NFR BLD AUTO: 11.5 % — SIGNIFICANT CHANGE UP (ref 2–14)
MONOCYTES NFR BLD AUTO: 9.3 % — SIGNIFICANT CHANGE UP (ref 2–14)
NEUTROPHILS # BLD AUTO: 10.8 K/UL — HIGH (ref 1.8–7.4)
NEUTROPHILS # BLD AUTO: 8 K/UL — HIGH (ref 1.8–7.4)
NEUTROPHILS NFR BLD AUTO: 71.8 % — SIGNIFICANT CHANGE UP (ref 43–77)
NEUTROPHILS NFR BLD AUTO: 85.5 % — HIGH (ref 43–77)
PHOSPHATE SERPL-MCNC: 7.4 MG/DL — HIGH (ref 2.4–4.7)
PLATELET # BLD AUTO: 219 K/UL — SIGNIFICANT CHANGE UP (ref 150–400)
PLATELET # BLD AUTO: 233 K/UL — SIGNIFICANT CHANGE UP (ref 150–400)
POTASSIUM SERPL-MCNC: 3.7 MMOL/L — SIGNIFICANT CHANGE UP (ref 3.5–5.3)
POTASSIUM SERPL-MCNC: 3.9 MMOL/L — SIGNIFICANT CHANGE UP (ref 3.5–5.3)
POTASSIUM SERPL-SCNC: 3.7 MMOL/L — SIGNIFICANT CHANGE UP (ref 3.5–5.3)
POTASSIUM SERPL-SCNC: 3.9 MMOL/L — SIGNIFICANT CHANGE UP (ref 3.5–5.3)
PROTHROM AB SERPL-ACNC: 14 SEC — HIGH (ref 10.6–13.6)
RBC # BLD: 4.09 M/UL — LOW (ref 4.2–5.8)
RBC # BLD: 4.12 M/UL — LOW (ref 4.2–5.8)
RBC # FLD: 13.9 % — SIGNIFICANT CHANGE UP (ref 10.3–14.5)
RBC # FLD: 13.9 % — SIGNIFICANT CHANGE UP (ref 10.3–14.5)
SODIUM SERPL-SCNC: 139 MMOL/L — SIGNIFICANT CHANGE UP (ref 135–145)
SODIUM SERPL-SCNC: 141 MMOL/L — SIGNIFICANT CHANGE UP (ref 135–145)
WBC # BLD: 11.15 K/UL — HIGH (ref 3.8–10.5)
WBC # BLD: 12.64 K/UL — HIGH (ref 3.8–10.5)
WBC # FLD AUTO: 11.15 K/UL — HIGH (ref 3.8–10.5)
WBC # FLD AUTO: 12.64 K/UL — HIGH (ref 3.8–10.5)

## 2021-10-26 PROCEDURE — 49000 EXPLORATION OF ABDOMEN: CPT

## 2021-10-26 PROCEDURE — 99232 SBSQ HOSP IP/OBS MODERATE 35: CPT | Mod: 25

## 2021-10-26 PROCEDURE — 88307 TISSUE EXAM BY PATHOLOGIST: CPT | Mod: 26

## 2021-10-26 PROCEDURE — 99233 SBSQ HOSP IP/OBS HIGH 50: CPT

## 2021-10-26 PROCEDURE — 74018 RADEX ABDOMEN 1 VIEW: CPT | Mod: 26

## 2021-10-26 RX ORDER — ONDANSETRON 8 MG/1
4 TABLET, FILM COATED ORAL ONCE
Refills: 0 | Status: DISCONTINUED | OUTPATIENT
Start: 2021-10-26 | End: 2021-10-26

## 2021-10-26 RX ORDER — SODIUM CHLORIDE 9 MG/ML
1000 INJECTION INTRAMUSCULAR; INTRAVENOUS; SUBCUTANEOUS
Refills: 0 | Status: DISCONTINUED | OUTPATIENT
Start: 2021-10-26 | End: 2021-10-26

## 2021-10-26 RX ORDER — SODIUM CHLORIDE 9 MG/ML
1000 INJECTION, SOLUTION INTRAVENOUS
Refills: 0 | Status: DISCONTINUED | OUTPATIENT
Start: 2021-10-26 | End: 2021-10-26

## 2021-10-26 RX ORDER — HYDROMORPHONE HYDROCHLORIDE 2 MG/ML
0.5 INJECTION INTRAMUSCULAR; INTRAVENOUS; SUBCUTANEOUS ONCE
Refills: 0 | Status: DISCONTINUED | OUTPATIENT
Start: 2021-10-26 | End: 2021-10-26

## 2021-10-26 RX ORDER — LEVETIRACETAM 250 MG/1
750 TABLET, FILM COATED ORAL EVERY 12 HOURS
Refills: 0 | Status: DISCONTINUED | OUTPATIENT
Start: 2021-10-26 | End: 2021-10-31

## 2021-10-26 RX ORDER — HEPARIN SODIUM 5000 [USP'U]/ML
INJECTION INTRAVENOUS; SUBCUTANEOUS
Qty: 25000 | Refills: 0 | Status: DISCONTINUED | OUTPATIENT
Start: 2021-10-27 | End: 2021-10-31

## 2021-10-26 RX ORDER — HYDROMORPHONE HYDROCHLORIDE 2 MG/ML
0.5 INJECTION INTRAMUSCULAR; INTRAVENOUS; SUBCUTANEOUS EVERY 4 HOURS
Refills: 0 | Status: DISCONTINUED | OUTPATIENT
Start: 2021-10-26 | End: 2021-10-29

## 2021-10-26 RX ORDER — ACETAMINOPHEN 500 MG
1000 TABLET ORAL EVERY 6 HOURS
Refills: 0 | Status: COMPLETED | OUTPATIENT
Start: 2021-10-26 | End: 2021-10-27

## 2021-10-26 RX ORDER — FENTANYL CITRATE 50 UG/ML
50 INJECTION INTRAVENOUS
Refills: 0 | Status: DISCONTINUED | OUTPATIENT
Start: 2021-10-26 | End: 2021-10-26

## 2021-10-26 RX ORDER — HYDROMORPHONE HYDROCHLORIDE 2 MG/ML
0.5 INJECTION INTRAMUSCULAR; INTRAVENOUS; SUBCUTANEOUS
Refills: 0 | Status: DISCONTINUED | OUTPATIENT
Start: 2021-10-26 | End: 2021-10-26

## 2021-10-26 RX ORDER — PANTOPRAZOLE SODIUM 20 MG/1
40 TABLET, DELAYED RELEASE ORAL DAILY
Refills: 0 | Status: DISCONTINUED | OUTPATIENT
Start: 2021-10-26 | End: 2021-10-31

## 2021-10-26 RX ORDER — KETOROLAC TROMETHAMINE 30 MG/ML
15 SYRINGE (ML) INJECTION EVERY 6 HOURS
Refills: 0 | Status: DISCONTINUED | OUTPATIENT
Start: 2021-10-26 | End: 2021-10-28

## 2021-10-26 RX ORDER — HYDROMORPHONE HYDROCHLORIDE 2 MG/ML
1 INJECTION INTRAMUSCULAR; INTRAVENOUS; SUBCUTANEOUS EVERY 4 HOURS
Refills: 0 | Status: DISCONTINUED | OUTPATIENT
Start: 2021-10-26 | End: 2021-10-29

## 2021-10-26 RX ADMIN — HYDROMORPHONE HYDROCHLORIDE 0.5 MILLIGRAM(S): 2 INJECTION INTRAMUSCULAR; INTRAVENOUS; SUBCUTANEOUS at 01:03

## 2021-10-26 RX ADMIN — HYDROMORPHONE HYDROCHLORIDE 0.5 MILLIGRAM(S): 2 INJECTION INTRAMUSCULAR; INTRAVENOUS; SUBCUTANEOUS at 15:27

## 2021-10-26 RX ADMIN — SODIUM CHLORIDE 42 MILLILITER(S): 9 INJECTION, SOLUTION INTRAVENOUS at 18:41

## 2021-10-26 RX ADMIN — HEPARIN SODIUM 1300 UNIT(S)/HR: 5000 INJECTION INTRAVENOUS; SUBCUTANEOUS at 00:34

## 2021-10-26 RX ADMIN — HYDROMORPHONE HYDROCHLORIDE 0.5 MILLIGRAM(S): 2 INJECTION INTRAMUSCULAR; INTRAVENOUS; SUBCUTANEOUS at 09:26

## 2021-10-26 RX ADMIN — Medication 400 MILLIGRAM(S): at 18:42

## 2021-10-26 RX ADMIN — Medication 400 MILLIGRAM(S): at 23:34

## 2021-10-26 RX ADMIN — Medication 5 MILLIGRAM(S): at 09:26

## 2021-10-26 RX ADMIN — LEVETIRACETAM 400 MILLIGRAM(S): 250 TABLET, FILM COATED ORAL at 20:05

## 2021-10-26 RX ADMIN — HYDROMORPHONE HYDROCHLORIDE 0.5 MILLIGRAM(S): 2 INJECTION INTRAMUSCULAR; INTRAVENOUS; SUBCUTANEOUS at 16:25

## 2021-10-26 RX ADMIN — Medication 15 MILLIGRAM(S): at 18:42

## 2021-10-26 RX ADMIN — Medication 100 MILLIGRAM(S): at 21:49

## 2021-10-26 RX ADMIN — HYDROMORPHONE HYDROCHLORIDE 0.5 MILLIGRAM(S): 2 INJECTION INTRAMUSCULAR; INTRAVENOUS; SUBCUTANEOUS at 09:56

## 2021-10-26 RX ADMIN — Medication 15 MILLIGRAM(S): at 23:34

## 2021-10-26 RX ADMIN — PANTOPRAZOLE SODIUM 40 MILLIGRAM(S): 20 TABLET, DELAYED RELEASE ORAL at 18:42

## 2021-10-26 RX ADMIN — HYDROMORPHONE HYDROCHLORIDE 0.5 MILLIGRAM(S): 2 INJECTION INTRAMUSCULAR; INTRAVENOUS; SUBCUTANEOUS at 02:00

## 2021-10-26 RX ADMIN — Medication 5 MILLIGRAM(S): at 04:38

## 2021-10-26 NOTE — PROGRESS NOTE ADULT - SUBJECTIVE AND OBJECTIVE BOX
HPI: 61 year old male patient with a known history of psoriasis, former cocaine abuse (quit 2010), Aortic dissection s/p emergent repair (2010), surgery complicated by CVA w/ residual left sided weakness and bowel ischemia s/p bowel resection and ostomy with reversal repair, Covid infection, ESRD on HD (Tu/Thur/Sat) s/p LUE AVF, HTN, seizure disorder, GIB s/p colonoscopy and AVMs cauterization 3/2021 and CAD s/p remote stent to LAD and recent CABG x 2 (DGV-OM, RPDA with Dr. Butler 11/2020), AI and MR s/p tAVR, tMVR (11/2020 Dr. Butler) with post-op pAfib/flutter (on Eliquis HFrEF (EF 45-50% 02/21), and s/p uncomplicated radiofrequency ablation atrial fibrillation and atrial flutter (WACA/PVI, CTI and mitral flutter) on 9/14/21. Patient presents this admission with two days of constipation and worsening abdominal discomfort associated with nausea and vomiting. He reports these symptoms started suddenly. He admits that after meals he wasn't able to keep them down and would throw up his pills as well for these last two days. He has a history of these types of events since his abdominal surgery which are self limiting. Late last night he does admit to brief palpitations. No complaints of chest pain, dyspnea, fever or chills.    Today he was taken to the OR for non resolution of his SBO and was found to have a thick adhesive band strangulating a loop of bowel that was necrotic and 4cm were resected with primary anastomosis. rest of his bowel looked viable. Patient required vasopressors after induction and through the case but were stopped after extubation. Has had no issues since except for uncontrolled abdominal pain. SICU was consulted for extensive cardiac history and requirement of vasopressors during the case.     Surgery Information   EBL: 10      UOP:  900            IV Fluids: 800      Blood Products: None          PAST MEDICAL & SURGICAL HISTORY:  CHF (congestive heart failure)  CVA (cerebral vascular accident)  Seizures  last seizure 10 years ago  HTN (hypertension)  Hyperlipemia  COVID-19 october 2020  Atrial fibrillation  ESRD on dialysis  Tu/Thurs/Sat  Former smoker  History of cocaine use  remote hx, currently sober  H/O aortic dissection  s/p repair (2010), surgery complicated by bowel ischemia s/p bowel resection and ostomy with reversal  H/O aortic valve insufficiency  Mitral regurgitation  GIB (gastrointestinal bleeding)  CAD (coronary artery disease)  s/p PCI 1998  and CABG x 2 11/2020  H/O colectomy  Status post double vessel coronary artery bypass  11/2020 Dr Butler  S/P AVR (aortic valve replacement)  (t)AVR 11/2020 Dr Butler  S/P MVR (mitral valve replacement)  (t)MVR 11/2020 Dr Butler  H/O aortic dissection  Type A; emergent repair 2010  AV fistula  LUE      Home Meds:   Allergies: penicillin (Other)    Soc:   Advanced Directives: Presumed Full Code     ROS:    General: Non-Contributory  Skin/Breast: Non-Contributory  Ophthalmologic: Non-Contributory  ENMT: Non-Contributory  Respiratory and Thorax: Non-Contributory  Cardiovascular: Non-Contributory  Gastrointestinal: Non-Contributory  Genitourinary: Non-Contributory  Musculoskeletal: Non-Contributory  Neurological: Non-Contributory  Psychiatric: Non-Contributory  Hematology/Lymphatics: Non-Contributory  Endocrine: Non-Contributory  Allergic/Immunologic: Non-Contributory    CURRENT MEDICATIONS:   --------------------------------------------------------------------------------------  Neurologic Medications  acetaminophen   IVPB .. 1000 milliGRAM(s) IV Intermittent every 6 hours  HYDROmorphone  Injectable 0.5 milliGRAM(s) IV Push every 4 hours PRN Breakthrough Pain  HYDROmorphone  Injectable 0.5 milliGRAM(s) IV Push every 4 hours PRN Moderate Pain (4 - 6)  HYDROmorphone  Injectable 1 milliGRAM(s) IV Push every 4 hours PRN Severe Pain (7 - 10)  ketorolac   Injectable 15 milliGRAM(s) IV Push every 6 hours  levETIRAcetam  IVPB 750 milliGRAM(s) IV Intermittent every 12 hours  ondansetron Injectable 4 milliGRAM(s) IV Push once PRN Nausea and/or Vomiting    Respiratory Medications    Cardiovascular Medications    Gastrointestinal Medications  pantoprazole  Injectable 40 milliGRAM(s) IV Push daily  sodium chloride 0.9% 1000 milliLiter(s) IV Continuous <Continuous>    Genitourinary Medications    Hematologic/Oncologic Medications    Antimicrobial/Immunologic Medications  clindamycin IVPB 900 milliGRAM(s) IV Intermittent every 8 hours    Endocrine/Metabolic Medications    Topical/Other Medications        EXAM:  General/Neuro  RASS: 0  GCS: 15  Exam: Normal, NAD, alert, oriented x 3, no focal deficits. PERRLA    Respiratory  Exam: Lungs clear to auscultation, Normal expansion/effort.     Cardiovascular  Exam: S1, S2.  Regular rate and rhythm.     Cardiac Rhythm: Normal Sinus Rhythm  ECHO: 11/2020: EF 40%. Global systolic dysfunction     GI  Exam: Abdomen soft, Non-tender, Non-distended.    Wound: Dry dressing at midline over staple incision   Current Diet:  NPO      Tubes/Lines/Drains    [x] Peripheral IV      [x] Urinary Catheter		    Extremities  Exam: Extremities warm, pink, well-perfused.        Derm:  Exam: Good skin turgor, no skin breakdown.      :   Exam: Painting catheter in place.   --------------------------------------------------------------------------------------  ASSESSMENT:  61y Male POD 0 from exploratory laparotomy and 4cm of small bowel resected for necrosis from thick adhesion. Patient required vasopressors through procedure that were discontinued after extubation. Patient has extensive cardiac history. Not in acute distress.     PLAN:   Neurologic: Treat pain with Tylenol, Toradol, and Dilaudid. Monitor mental status. On home dose of Keppra for seizures.   Respiratory: On room air. Monitor RR and Saturations.   Cardiovascular: f/u EP and Cardiology. Home antihypertensives held until BP is better controlled. F/u with EP vs cardiology for possible MUSTAPHA this admission.   Gastrointestinal/Nutrition: NPO with sips and ice. Monitor bowel function. On IV Protonix for hx of GIB.   Renal/Genitourinary: f/u nephrology. Last HD 10/25. Possible HD 10/27 vs 10/28 per nephrology. Monitor U/O. Possible D/C painting 10/26.  Hematologic: Hgb stable. Start  Heparin drip 10/27 AM for goal of transitioning to home Eliquis in the next couple of days. Trend H/H.  Infectious Disease: Clindamycin until 10/28 for surgical prophylaxis.   Lines/Tubes: PIV and Painting catheter  Endocrine: None  Disposition: SICU. Possible Downgrade tomorrow 10/27    60 yo M with extensive cardiac history is s/p exploratory laparotomy, extensive RENEA, 4cm of small bowel resected for SB near amputation, and repair of enterotomy, was on vasopressors through procedure which were discontinued after extubation.   AAOx3, NG in place, painting present   PUL CTA  CV RRR  GI midline dressing intact  WBC 12.6  Plan   1. Admit to ICU for cardiac monitoring  2. Continue Clindamycin for 48 hrs  3. Nephrology to follow for HD  4. Analgesia for pain control and consider dilaudid PCA  5. ice chip while NG to suction  6. PT/OT to mobility

## 2021-10-26 NOTE — PROGRESS NOTE ADULT - SUBJECTIVE AND OBJECTIVE BOX
INTERVAL HPI/OVERNIGHT EVENTS:  Patient was seen and examined at bedside this AM.  No acute events overnight.  Provider called for acute abdominal pain for Mr Cummins Pt seen and examined bedside overnight around 100, sleeping states pain is stable from AM, abd soft, lower abd TTP without rebound/guarding/rigidity. NGT sump tube clogged, flushed with 60cc syringe until functioning on LCWS with evacuation of 300ccs bilious output. Dilaudid 0.5mg IVP ordered. Labs noted, heparin gtt held for ptt>200. Plan for surgery tomorrow for laparotomy for nonresolving SBO.       MEDICATIONS  (STANDING):  heparin  Infusion. 1700 Unit(s)/Hr (17 mL/Hr) IV Continuous <Continuous>  HYDROmorphone  Injectable 0.5 milliGRAM(s) IV Push once  lactated ringers. 1000 milliLiter(s) (115 mL/Hr) IV Continuous <Continuous>  metoprolol tartrate Injectable 5 milliGRAM(s) IV Push every 6 hours  pantoprazole  Injectable 40 milliGRAM(s) IV Push daily    MEDICATIONS  (PRN):  HYDROmorphone  Injectable 0.5 milliGRAM(s) IV Push every 4 hours PRN Moderate Pain (4 - 6)  ondansetron Injectable 4 milliGRAM(s) IV Push every 6 hours PRN Nausea and/or Vomiting      Vital Signs Last 24 Hrs  T(C): 36.6 (25 Oct 2021 16:19), Max: 36.7 (25 Oct 2021 15:03)  T(F): 97.9 (25 Oct 2021 16:19), Max: 98 (25 Oct 2021 15:03)  HR: 77 (25 Oct 2021 22:02) (74 - 88)  BP: 118/66 (25 Oct 2021 22:02) (116/69 - 195/74)  BP(mean): --  RR: 19 (25 Oct 2021 16:19) (18 - 19)  SpO2: 94% (25 Oct 2021 16:19) (94% - 100%)    Physical Exam:  Gen: NAD  Neurological:  No sensory/motor deficits  HEENT: PERRLA, EOMI, NGT on LCWS bilious OP  Respiratory: Breath Sounds equal & CTA bilaterally, no accessory muscle use  Cardiovascular: Regular rate & rhythm, normal S1, S2; no murmurs, gallops or rubs  Gastrointestinal: Soft, Distended, lower abd TTP, no rebound/guarding rigidity  Vascular: Equal and normal pulses: 2+ peripheral pulses throughout  Musculoskeletal: No joint pain, swelling or deformity; no limitation of movement  Skin: No rashes      I&O's Detail    24 Oct 2021 07:01  -  25 Oct 2021 07:00  --------------------------------------------------------  IN:    Heparin Infusion: 367 mL    IV PiggyBack: 200 mL    Lactated Ringers: 2526 mL  Total IN: 3093 mL    OUT:    Nasogastric/Oral tube (mL): 3300 mL  Total OUT: 3300 mL    Total NET: -207 mL      25 Oct 2021 07:01  -  26 Oct 2021 00:50  --------------------------------------------------------  IN:    Heparin Infusion: 166 mL    IV PiggyBack: 300 mL    Lactated Ringers: 690 mL  Total IN: 1156 mL    OUT:    Nasogastric/Oral tube (mL): 1700 mL    Other (mL): 0 mL  Total OUT: 1700 mL    Total NET: -544 mL          LABS:                        13.6   10.68 )-----------( 252      ( 25 Oct 2021 11:20 )             40.2     10-25    138  |  92<L>  |  41.5<H>  ----------------------------<  128<H>  3.6   |  30.0<H>  |  3.71<H>    Ca    9.3      25 Oct 2021 22:39  Phos  5.1     10-25  Mg     2.0     10-25    TPro  8.5  /  Alb  5.0  /  TBili  1.3  /  DBili  x   /  AST  33  /  ALT  17  /  AlkPhos  113  10-24    PT/INR - ( 25 Oct 2021 22:39 )   PT: 16.0 sec;   INR: 1.40 ratio         PTT - ( 25 Oct 2021 22:39 )  PTT:>200.0 sec      RADIOLOGY & ADDITIONAL STUDIES:

## 2021-10-26 NOTE — CONSULT NOTE ADULT - SUBJECTIVE AND OBJECTIVE BOX
SICU Consultation Note    HPI: 61 year old male patient with a known history of psoriasis, former cocaine abuse (quit 2010), Aortic dissection s/p emergent repair (2010), surgery complicated by CVA w/ residual left sided weakness and bowel ischemia s/p bowel resection and ostomy with reversal repair, Covid infection, ESRD on HD (Tu/Thur/Sat) s/p LUE AVF, HTN, seizure disorder, GIB s/p colonoscopy and AVMs cauterization 3/2021 and CAD s/p remote stent to LAD and recent CABG x 2 (DGV-OM, RPDA with Dr. Butler 11/2020), AI and MR s/p tAVR, tMVR (11/2020 Dr. Butler) with post-op pAfib/flutter (on Eliquis HFrEF (EF 45-50% 02/21), and s/p uncomplicated radiofrequency ablation atrial fibrillation and atrial flutter (WACA/PVI, CTI and mitral flutter) on 9/14/21. Patient presents this admission with two days of constipation and worsening abdominal discomfort associated with nausea and vomiting. He reports these symptoms started suddenly. He admits that after meals he wasn't able to keep them down and would throw up his pills as well for these last two days. He has a history of these types of events since his abdominal surgery which are self limiting. Late last night he does admit to brief palpitations. No complaints of chest pain, dyspnea, fever or chills.    Today he was taken to the OR for non resolution of his SBO and was found to have a thick adhesive band strangulating a loop of bowel that was necrotic and 4cm were resected with primary anastomosis. rest of his bowel looked viable. Patient required vasopressors after induction and through the case but were stopped after extubation. Has had no issues since except for uncontrolled abdominal pain. SICU was consulted for extensive cardiac history and requirement of vasopressors during the case.     Surgery Information   EBL: 10      UOP:  900            IV Fluids: 800      Blood Products: None          PAST MEDICAL & SURGICAL HISTORY:  CHF (congestive heart failure)  CVA (cerebral vascular accident)  Seizures  last seizure 10 years ago  HTN (hypertension)  Hyperlipemia  COVID-19 october 2020  Atrial fibrillation  ESRD on dialysis  Tu/Thurs/Sat  Former smoker  History of cocaine use  remote hx, currently sober  H/O aortic dissection  s/p repair (2010), surgery complicated by bowel ischemia s/p bowel resection and ostomy with reversal  H/O aortic valve insufficiency  Mitral regurgitation  GIB (gastrointestinal bleeding)  CAD (coronary artery disease)  s/p PCI 1998  and CABG x 2 11/2020  H/O colectomy  Status post double vessel coronary artery bypass  11/2020 Dr Butler  S/P AVR (aortic valve replacement)  (t)AVR 11/2020 Dr Butler  S/P MVR (mitral valve replacement)  (t)MVR 11/2020 Dr Butler  H/O aortic dissection  Type A; emergent repair 2010  AV fistula  [facility] Meds:   Allergies: penicillin (Other)    Soc:   Advanced Directives: Presumed Full Code     ROS:    General: Non-Contributory  Skin/Breast: Non-Contributory  Ophthalmologic: Non-Contributory  ENMT: Non-Contributory  Respiratory and Thorax: Non-Contributory  Cardiovascular: Non-Contributory  Gastrointestinal: Non-Contributory  Genitourinary: Non-Contributory  Musculoskeletal: Non-Contributory  Neurological: Non-Contributory  Psychiatric: Non-Contributory  Hematology/Lymphatics: Non-Contributory  Endocrine: Non-Contributory  Allergic/Immunologic: Non-Contributory    CURRENT MEDICATIONS:   --------------------------------------------------------------------------------------  Neurologic Medications  acetaminophen   IVPB .. 1000 milliGRAM(s) IV Intermittent every 6 hours  HYDROmorphone  Injectable 0.5 milliGRAM(s) IV Push every 4 hours PRN Breakthrough Pain  HYDROmorphone  Injectable 0.5 milliGRAM(s) IV Push every 4 hours PRN Moderate Pain (4 - 6)  HYDROmorphone  Injectable 1 milliGRAM(s) IV Push every 4 hours PRN Severe Pain (7 - 10)  ketorolac   Injectable 15 milliGRAM(s) IV Push every 6 hours  levETIRAcetam  IVPB 750 milliGRAM(s) IV Intermittent every 12 hours  ondansetron Injectable 4 milliGRAM(s) IV Push once PRN Nausea and/or Vomiting    Respiratory Medications    Cardiovascular Medications    Gastrointestinal Medications  pantoprazole  Injectable 40 milliGRAM(s) IV Push daily  sodium chloride 0.9% 1000 milliLiter(s) IV Continuous <Continuous>    Genitourinary Medications    Hematologic/Oncologic Medications    Antimicrobial/Immunologic Medications  clindamycin IVPB 900 milliGRAM(s) IV Intermittent every 8 hours    Endocrine/Metabolic Medications    Topical/Other Medications        EXAM:  General/Neuro  RASS: 0  GCS: 15  Exam: Normal, NAD, alert, oriented x 3, no focal deficits. PERRLA    Respiratory  Exam: Lungs clear to auscultation, Normal expansion/effort.     Cardiovascular  Exam: S1, S2.  Regular rate and rhythm.     Cardiac Rhythm: Normal Sinus Rhythm  ECHO: 11/2020: EF 40%. Global systolic dysfunction     GI  Exam: Abdomen soft, Non-tender, Non-distended.    Wound: Dry dressing at midline over staple incision   Current Diet:  NPO      Tubes/Lines/Drains    [x] Peripheral IV      [x] Urinary Catheter		    Extremities  Exam: Extremities warm, pink, well-perfused.        Derm:  Exam: Good skin turgor, no skin breakdown.      :   Exam: Chapman catheter in place.   --------------------------------------------------------------------------------------

## 2021-10-26 NOTE — CONSULT NOTE ADULT - ATTENDING COMMENTS
62 yo M with extensive cardiac history is s/p exploratory laparotomy, extensive RENEA, 4cm of small bowel resected for SB near amputation, and repair of enterotomy, was on vasopressors through procedure which were discontinued after extubation.   AAOx3, NG in place, painting present   PUL CTA  CV RRR  GI midline dressing intact  WBC 12.6  Plan   1. Admit to ICU for cardiac monitoring  2. Continue Clindamycin for 48 hrs  3. Nephrology to follow for HD  4. Analgesia for pain control and consider dilaudid PCA  5. ice chip while NG to suction  6. PT/OT to mobility      code [id number]

## 2021-10-26 NOTE — CHART NOTE - NSCHARTNOTEFT_GEN_A_CORE
Called by RN for Pt. with desaturation and tachypnea.  Pt. with Spo2 93% on NRB mask.  HR- 83, RR-20 and mildly labored.  Pt. able to speak in full sentences.    BS:  right CTA, left diminished throughout    A/P  CXR stat  ABG  CPT left lung  albuterol neb stat  encourage deep breath and cough, IS q1h  pain meds as Pt. is splinting  d/w Dr. Mcmahan

## 2021-10-26 NOTE — PROGRESS NOTE ADULT - ASSESSMENT
61M multiple comorbidities with SBO 2/2 adhesive disease, nonresolution with decompression and gastrograffin challenge, high NGT OP with persistent abd tenderness AVSS without clinical signs of perforation or ischemia at this time    Plan for laparotomy REENA possible bowel resection tomorrow  Hold heparin gtt, will resume after OR, recheck PTT q6h  AM BMP, nephrology recommendations and optimization prior to OR  Pain control

## 2021-10-27 LAB
ANION GAP SERPL CALC-SCNC: 19 MMOL/L — HIGH (ref 5–17)
APTT BLD: 24.1 SEC — LOW (ref 27.5–35.5)
APTT BLD: 68.5 SEC — HIGH (ref 27.5–35.5)
APTT BLD: 77.7 SEC — HIGH (ref 27.5–35.5)
BASOPHILS # BLD AUTO: 0.04 K/UL — SIGNIFICANT CHANGE UP (ref 0–0.2)
BASOPHILS NFR BLD AUTO: 0.4 % — SIGNIFICANT CHANGE UP (ref 0–2)
BUN SERPL-MCNC: 70.3 MG/DL — HIGH (ref 8–20)
CALCIUM SERPL-MCNC: 8 MG/DL — LOW (ref 8.6–10.2)
CHLORIDE SERPL-SCNC: 92 MMOL/L — LOW (ref 98–107)
CO2 SERPL-SCNC: 26 MMOL/L — SIGNIFICANT CHANGE UP (ref 22–29)
CREAT SERPL-MCNC: 4.29 MG/DL — HIGH (ref 0.5–1.3)
EOSINOPHIL # BLD AUTO: 0.02 K/UL — SIGNIFICANT CHANGE UP (ref 0–0.5)
EOSINOPHIL NFR BLD AUTO: 0.2 % — SIGNIFICANT CHANGE UP (ref 0–6)
GLUCOSE SERPL-MCNC: 110 MG/DL — HIGH (ref 70–99)
HCT VFR BLD CALC: 35.1 % — LOW (ref 39–50)
HCT VFR BLD CALC: 35.8 % — LOW (ref 39–50)
HGB BLD-MCNC: 11.3 G/DL — LOW (ref 13–17)
HGB BLD-MCNC: 11.8 G/DL — LOW (ref 13–17)
IMM GRANULOCYTES NFR BLD AUTO: 0.6 % — SIGNIFICANT CHANGE UP (ref 0–1.5)
INR BLD: 1.2 RATIO — HIGH (ref 0.88–1.16)
INR BLD: 1.37 RATIO — HIGH (ref 0.88–1.16)
LYMPHOCYTES # BLD AUTO: 0.87 K/UL — LOW (ref 1–3.3)
LYMPHOCYTES # BLD AUTO: 8.8 % — LOW (ref 13–44)
MAGNESIUM SERPL-MCNC: 2 MG/DL — SIGNIFICANT CHANGE UP (ref 1.8–2.6)
MCHC RBC-ENTMCNC: 29.9 PG — SIGNIFICANT CHANGE UP (ref 27–34)
MCHC RBC-ENTMCNC: 30.1 PG — SIGNIFICANT CHANGE UP (ref 27–34)
MCHC RBC-ENTMCNC: 32.2 GM/DL — SIGNIFICANT CHANGE UP (ref 32–36)
MCHC RBC-ENTMCNC: 33 GM/DL — SIGNIFICANT CHANGE UP (ref 32–36)
MCV RBC AUTO: 90.6 FL — SIGNIFICANT CHANGE UP (ref 80–100)
MCV RBC AUTO: 93.4 FL — SIGNIFICANT CHANGE UP (ref 80–100)
MONOCYTES # BLD AUTO: 0.87 K/UL — SIGNIFICANT CHANGE UP (ref 0–0.9)
MONOCYTES NFR BLD AUTO: 8.8 % — SIGNIFICANT CHANGE UP (ref 2–14)
NEUTROPHILS # BLD AUTO: 8.04 K/UL — HIGH (ref 1.8–7.4)
NEUTROPHILS NFR BLD AUTO: 81.2 % — HIGH (ref 43–77)
PHOSPHATE SERPL-MCNC: 7.5 MG/DL — HIGH (ref 2.4–4.7)
PLATELET # BLD AUTO: 175 K/UL — SIGNIFICANT CHANGE UP (ref 150–400)
PLATELET # BLD AUTO: 188 K/UL — SIGNIFICANT CHANGE UP (ref 150–400)
POTASSIUM SERPL-MCNC: 3.8 MMOL/L — SIGNIFICANT CHANGE UP (ref 3.5–5.3)
POTASSIUM SERPL-SCNC: 3.8 MMOL/L — SIGNIFICANT CHANGE UP (ref 3.5–5.3)
PROTHROM AB SERPL-ACNC: 13.8 SEC — HIGH (ref 10.6–13.6)
PROTHROM AB SERPL-ACNC: 15.7 SEC — HIGH (ref 10.6–13.6)
RBC # BLD: 3.76 M/UL — LOW (ref 4.2–5.8)
RBC # BLD: 3.95 M/UL — LOW (ref 4.2–5.8)
RBC # FLD: 13.8 % — SIGNIFICANT CHANGE UP (ref 10.3–14.5)
RBC # FLD: 13.9 % — SIGNIFICANT CHANGE UP (ref 10.3–14.5)
SODIUM SERPL-SCNC: 137 MMOL/L — SIGNIFICANT CHANGE UP (ref 135–145)
WBC # BLD: 8.19 K/UL — SIGNIFICANT CHANGE UP (ref 3.8–10.5)
WBC # BLD: 9.9 K/UL — SIGNIFICANT CHANGE UP (ref 3.8–10.5)
WBC # FLD AUTO: 8.19 K/UL — SIGNIFICANT CHANGE UP (ref 3.8–10.5)
WBC # FLD AUTO: 9.9 K/UL — SIGNIFICANT CHANGE UP (ref 3.8–10.5)

## 2021-10-27 PROCEDURE — 90937 HEMODIALYSIS REPEATED EVAL: CPT

## 2021-10-27 PROCEDURE — 99024 POSTOP FOLLOW-UP VISIT: CPT

## 2021-10-27 RX ORDER — METOPROLOL TARTRATE 50 MG
2.5 TABLET ORAL EVERY 6 HOURS
Refills: 0 | Status: DISCONTINUED | OUTPATIENT
Start: 2021-10-27 | End: 2021-10-31

## 2021-10-27 RX ORDER — TAMSULOSIN HYDROCHLORIDE 0.4 MG/1
0.4 CAPSULE ORAL AT BEDTIME
Refills: 0 | Status: DISCONTINUED | OUTPATIENT
Start: 2021-10-27 | End: 2021-10-31

## 2021-10-27 RX ORDER — ALTEPLASE 100 MG
2 KIT INTRAVENOUS ONCE
Refills: 0 | Status: COMPLETED | OUTPATIENT
Start: 2021-10-27 | End: 2021-10-27

## 2021-10-27 RX ADMIN — Medication 15 MILLIGRAM(S): at 06:02

## 2021-10-27 RX ADMIN — Medication 15 MILLIGRAM(S): at 17:44

## 2021-10-27 RX ADMIN — Medication 100 MILLIGRAM(S): at 22:43

## 2021-10-27 RX ADMIN — HYDROMORPHONE HYDROCHLORIDE 0.5 MILLIGRAM(S): 2 INJECTION INTRAMUSCULAR; INTRAVENOUS; SUBCUTANEOUS at 18:00

## 2021-10-27 RX ADMIN — HYDROMORPHONE HYDROCHLORIDE 0.5 MILLIGRAM(S): 2 INJECTION INTRAMUSCULAR; INTRAVENOUS; SUBCUTANEOUS at 14:47

## 2021-10-27 RX ADMIN — Medication 15 MILLIGRAM(S): at 17:59

## 2021-10-27 RX ADMIN — PANTOPRAZOLE SODIUM 40 MILLIGRAM(S): 20 TABLET, DELAYED RELEASE ORAL at 14:17

## 2021-10-27 RX ADMIN — ALTEPLASE 2 MILLIGRAM(S): KIT at 04:27

## 2021-10-27 RX ADMIN — Medication 15 MILLIGRAM(S): at 14:20

## 2021-10-27 RX ADMIN — Medication 1000 MILLIGRAM(S): at 00:34

## 2021-10-27 RX ADMIN — Medication 400 MILLIGRAM(S): at 05:02

## 2021-10-27 RX ADMIN — Medication 400 MILLIGRAM(S): at 14:20

## 2021-10-27 RX ADMIN — Medication 2.5 MILLIGRAM(S): at 17:44

## 2021-10-27 RX ADMIN — HYDROMORPHONE HYDROCHLORIDE 0.5 MILLIGRAM(S): 2 INJECTION INTRAMUSCULAR; INTRAVENOUS; SUBCUTANEOUS at 21:04

## 2021-10-27 RX ADMIN — Medication 15 MILLIGRAM(S): at 00:34

## 2021-10-27 RX ADMIN — HYDROMORPHONE HYDROCHLORIDE 0.5 MILLIGRAM(S): 2 INJECTION INTRAMUSCULAR; INTRAVENOUS; SUBCUTANEOUS at 20:08

## 2021-10-27 RX ADMIN — Medication 100 MILLIGRAM(S): at 05:54

## 2021-10-27 RX ADMIN — LEVETIRACETAM 400 MILLIGRAM(S): 250 TABLET, FILM COATED ORAL at 20:08

## 2021-10-27 RX ADMIN — HEPARIN SODIUM 1500 UNIT(S)/HR: 5000 INJECTION INTRAVENOUS; SUBCUTANEOUS at 20:16

## 2021-10-27 RX ADMIN — HYDROMORPHONE HYDROCHLORIDE 0.5 MILLIGRAM(S): 2 INJECTION INTRAMUSCULAR; INTRAVENOUS; SUBCUTANEOUS at 17:45

## 2021-10-27 RX ADMIN — Medication 15 MILLIGRAM(S): at 05:02

## 2021-10-27 RX ADMIN — TAMSULOSIN HYDROCHLORIDE 0.4 MILLIGRAM(S): 0.4 CAPSULE ORAL at 22:44

## 2021-10-27 RX ADMIN — LEVETIRACETAM 400 MILLIGRAM(S): 250 TABLET, FILM COATED ORAL at 05:02

## 2021-10-27 RX ADMIN — HEPARIN SODIUM 1500 UNIT(S)/HR: 5000 INJECTION INTRAVENOUS; SUBCUTANEOUS at 05:35

## 2021-10-27 RX ADMIN — Medication 2.5 MILLIGRAM(S): at 14:18

## 2021-10-27 RX ADMIN — HYDROMORPHONE HYDROCHLORIDE 0.5 MILLIGRAM(S): 2 INJECTION INTRAMUSCULAR; INTRAVENOUS; SUBCUTANEOUS at 15:15

## 2021-10-27 RX ADMIN — Medication 1000 MILLIGRAM(S): at 06:02

## 2021-10-27 RX ADMIN — Medication 100 MILLIGRAM(S): at 16:46

## 2021-10-27 NOTE — CHART NOTE - NSCHARTNOTEFT_GEN_A_CORE
SICU TRANSFER NOTE  -----------------------------  ICU Admission Date: 10/23/21  Transfer Date: 10-27-21 @ 12:51    Admission Diagnosis: SBO    Active Problems/injuries: ESRD on HD, hx A fib, CAD, s/p ex lap w/ sbr    Procedures: s/p ex lap w/ 4 cm of small bowel resected    Consultants: Cards, nephrology    Medications  acetaminophen   IVPB .. 1000 milliGRAM(s) IV Intermittent every 6 hours  clindamycin IVPB 900 milliGRAM(s) IV Intermittent every 8 hours  heparin  Infusion.  Unit(s)/Hr IV Continuous <Continuous>  HYDROmorphone  Injectable 0.5 milliGRAM(s) IV Push every 4 hours PRN  HYDROmorphone  Injectable 0.5 milliGRAM(s) IV Push every 4 hours PRN  HYDROmorphone  Injectable 1 milliGRAM(s) IV Push every 4 hours PRN  ketorolac   Injectable 15 milliGRAM(s) IV Push every 6 hours  levETIRAcetam  IVPB 750 milliGRAM(s) IV Intermittent every 12 hours  metoprolol tartrate Injectable 2.5 milliGRAM(s) IV Push every 6 hours  pantoprazole  Injectable 40 milliGRAM(s) IV Push daily  tamsulosin 0.4 milliGRAM(s) Oral at bedtime    [x] I attest I have reviewed and reconciled all medications prior to transfer    IV Fluids  sodium chloride 0.9%: 1000 milliLiter(s)      with sodium bicarbonate additive 75 milliEquivalent(s), infuse at 42 mL/Hr  sodium chloride 0.9%.: Solution, 1000 milliLiter(s) infuse at 80 mL/Hr  lactated ringers.: Solution, 1000 milliLiter(s) infuse at 75 mL/Hr  Provider's Contact #: [phone]  lactated ringers Bolus:   1000 milliLiter(s), IV Bolus, once, infuse over 60 Minute(s), Stop After 1 Doses  lactated ringers.: Solution, 1000 milliLiter(s) infuse at 1000 mL/Hr  lactated ringers Bolus:   2000 milliLiter(s), IV Bolus, once, infuse over 4 Hour(s), Stop After 1 Doses  lactated ringers Bolus:   1000 milliLiter(s), IV Bolus, once, infuse over 60 Minute(s), Stop After 1 Doses  lactated ringers.: Solution, 1000 milliLiter(s) infuse at 115 mL/Hr  sodium chloride 0.9% Bolus:   500 milliLiter(s), IV Bolus, once, infuse over 60 Minute(s), Stop After 1 Doses  sodium chloride 0.9% Bolus:   1000 milliLiter(s), IV Bolus, once, infuse over 60 Minute(s), Stop After 1 Doses    Indication: hydration    Antibiotics:  clindamycin IVPB 900 milliGRAM(s) IV Intermittent every 8 hours    Indication: SCIP End Date: 10/28    I have discussed this case with Dr. Davenport upon transfer and all questions regarding ICU course were answered.  The following items are to be followed up:  - therapeutic x1 on heparin ggt, follow up PTT this evening  - needs to be transitioned back to [facility] when HD stable  - NPO until bowel function  - ESRD on dialysis, renal following  - Re-started beta blocker this AM, 2.5 IVP q6 with hold parameters, can increase to 5 mg q 6 tolerating well, other antihypertensives still on hold. Re-start as clinically appropriate.  - started Flomax this AM for urinary retention, D/C painting 10/29.

## 2021-10-27 NOTE — DIETITIAN INITIAL EVALUATION ADULT. - PERTINENT LABORATORY DATA
10-27 Na137 mmol/L Glu 110 mg/dL<H> K+ 3.8 mmol/L Cr  4.29 mg/dL<H> BUN 70.3 mg/dL<H> Phos 7.5 mg/dL<H> Alb n/a   PAB n/a

## 2021-10-27 NOTE — DIETITIAN INITIAL EVALUATION ADULT. - OTHER INFO
Pt is a 61 year old male patient with a known history of psoriasis, former cocaine abuse (quit 2010), Aortic dissection s/p emergent repair (2010), surgery complicated by CVA w/ residual left sided weakness and bowel ischemia s/p bowel resection and ostomy with reversal repair, Covid infection, ESRD on HD (Tu/Thur/Sat) s/p LUE AVF, HTN, seizure disorder, GIB s/p colonoscopy and AVMs cauterization 3/2021 and CAD s/p remote stent to LAD and recent CABG x 2 (DGV-OM, RPDA with Dr. Butler 11/2020), AI and MR s/p tAVR, tMVR (11/2020 Dr. Butler) with post-op pAfib/flutter (on Eliquis HFrEF (EF 45-50% 02/21), and s/p uncomplicated radiofrequency ablation atrial fibrillation and atrial flutter (WACA/PVI, CTI and mitral flutter) on 9/14/21. Patient presents this admission with two days of constipation and worsening abdominal discomfort associated with nausea and vomiting. He reports these symptoms started suddenly. He admits that after meals he wasn't able to keep them down and would throw up his pills as well for these last two days.   Pt admitted with SBO; s/p ex-lap w/ RENEA and SBR w/ primary anastomosis and 2 layer repair of 2 enterotomy' s. Received HD on 10/25 without any complications. NGT remains to low continuous suction with minimal output. Pt sleeping soundly during visit; unable to interview at this time. Pt remains NPO at this time. Brief NFPE conducted.

## 2021-10-27 NOTE — PROGRESS NOTE ADULT - SUBJECTIVE AND OBJECTIVE BOX
24h Events: Patient is POD #1 s/p ex-lap w/ RENEA and SBR w/ primary anastomosis and 2 layer repair of 2 enterotomy's. Received HD on 10/25 without any complications. NGT remains to low continuous suction with minimal output. Chapman in place. Heparin drip was started this AM. Denies HA, fever, chills, cough, chest pain, SOB, N/V.     ICU Vital Signs Last 24 Hrs  T(C): 36.8 (27 Oct 2021 03:51), Max: 36.8 (26 Oct 2021 14:15)  T(F): 98.2 (27 Oct 2021 03:51), Max: 98.3 (26 Oct 2021 14:15)  HR: 86 (27 Oct 2021 04:00) (76 - 90)  BP: 162/63 (27 Oct 2021 04:00) (123/56 - 169/69)  BP(mean): 91 (27 Oct 2021 04:00) (76 - 93)  ABP: --  ABP(mean): --  RR: 15 (27 Oct 2021 04:00) (10 - 27)  SpO2: 99% (27 Oct 2021 04:00) (94% - 100%)      I&O's Detail    25 Oct 2021 07:01  -  26 Oct 2021 07:00  --------------------------------------------------------  IN:    Heparin Infusion: 166 mL    IV PiggyBack: 300 mL    Lactated Ringers: 690 mL  Total IN: 1156 mL    OUT:    Nasogastric/Oral tube (mL): 1700 mL    Other (mL): 0 mL  Total OUT: 1700 mL    Total NET: -544 mL      26 Oct 2021 07:01  -  27 Oct 2021 05:26  --------------------------------------------------------  IN:    IV PiggyBack: 100 mL    IV PiggyBack: 50 mL    IV PiggyBack: 200 mL    sodium chloride 0.9% w/ Additives: 210 mL  Total IN: 560 mL    OUT:    Indwelling Catheter - Urethral (mL): 1285 mL  Total OUT: 1285 mL    Total NET: -725 mL    MEDICATIONS  (STANDING):  acetaminophen   IVPB .. 1000 milliGRAM(s) IV Intermittent every 6 hours  clindamycin IVPB 900 milliGRAM(s) IV Intermittent every 8 hours  heparin  Infusion.  Unit(s)/Hr (15 mL/Hr) IV Continuous <Continuous>  ketorolac   Injectable 15 milliGRAM(s) IV Push every 6 hours  levETIRAcetam  IVPB 750 milliGRAM(s) IV Intermittent every 12 hours  pantoprazole  Injectable 40 milliGRAM(s) IV Push daily    MEDICATIONS  (PRN):  HYDROmorphone  Injectable 0.5 milliGRAM(s) IV Push every 4 hours PRN Breakthrough Pain  HYDROmorphone  Injectable 0.5 milliGRAM(s) IV Push every 4 hours PRN Moderate Pain (4 - 6)  HYDROmorphone  Injectable 1 milliGRAM(s) IV Push every 4 hours PRN Severe Pain (7 - 10)        Physical Exam:    Constitutional: NAD  HEENT: PERRL, EOMI  Neck: No JVD, FROM without pain  Respiratory: Respirations non-labored, no accessory muscle use  Cardiovascular: Regular rate & rhythm  Gastrointestinal: Soft, appropriately tender to palpation, prevena in place on midline incision.   Extremities: L AVF with good thrill, R midline w/ dressing c/d/i  Neurological: A&O x 3; without gross deficit    LABS:  CBC Full  -  ( 27 Oct 2021 05:00 )  WBC Count : 9.90 K/uL  RBC Count : 3.76 M/uL  Hemoglobin : 11.3 g/dL  Hematocrit : 35.1 %  Platelet Count - Automated : 175 K/uL  Mean Cell Volume : 93.4 fl  Mean Cell Hemoglobin : 30.1 pg  Mean Cell Hemoglobin Concentration : 32.2 gm/dL  Auto Neutrophil # : 8.04 K/uL  Auto Lymphocyte # : 0.87 K/uL  Auto Monocyte # : 0.87 K/uL  Auto Eosinophil # : 0.02 K/uL  Auto Basophil # : 0.04 K/uL  Auto Neutrophil % : 81.2 %  Auto Lymphocyte % : 8.8 %  Auto Monocyte % : 8.8 %  Auto Eosinophil % : 0.2 %  Auto Basophil % : 0.4 %    10-26    139  |  96<L>  |  62.2<H>  ----------------------------<  114<H>  3.9   |  25.0  |  4.02<H>    Ca    8.1<L>      26 Oct 2021 21:47  Phos  7.4     10-26  Mg     1.9     10-26      PT/INR - ( 27 Oct 2021 05:00 )   PT: 13.8 sec;   INR: 1.20 ratio         PTT - ( 27 Oct 2021 05:00 )  PTT:24.1 sec    RECENT CULTURES:

## 2021-10-27 NOTE — PROGRESS NOTE ADULT - SUBJECTIVE AND OBJECTIVE BOX
ICU Vital Signs Last 24 Hrs  T(C): 36.4 (27 Oct 2021 12:00), Max: 36.8 (26 Oct 2021 14:15)  T(F): 97.6 (27 Oct 2021 12:00), Max: 98.3 (26 Oct 2021 14:15)  HR: 88 (27 Oct 2021 13:00) (81 - 90)  BP: 179/62 (27 Oct 2021 13:00) (123/56 - 179/62)  BP(mean): 94 (27 Oct 2021 13:00) (76 - 96)  RR: 24 (27 Oct 2021 13:00) (10 - 27)  SpO2: 98% (27 Oct 2021 11:00) (94% - 100%)  Pt is seen and examined on dialysis. No symptoms. Hemodynamics stable. Tolerating dialysis and ultrafiltration.  Pre Laboratory values personally reviewed by me.  Dialysis adjusted appropriately based on current values.  Will continue the current medical management.  Next hemodialysis as scheduled.  Discussed with nursing, primary care team.

## 2021-10-27 NOTE — PROGRESS NOTE ADULT - ASSESSMENT
ASSESSMENT: 61y Male POD 1 from exploratory laparotomy and 4cm of small bowel resected for necrosis from thick adhesion. Patient required vasopressors through procedure that were discontinued after extubation. Patient has extensive cardiac history. Not in acute distress.     PLAN:   Neurologic: Treat pain with Tylenol, Toradol, and Dilaudid. Monitor mental status. On home dose of Keppra for seizures.   Respiratory: On room air. Monitor RR and Saturations.   Cardiovascular: f/u EP and Cardiology. Home antihypertensives held until BP is better controlled. F/u with EP vs cardiology for possible MUSTAPHA this admission.   Gastrointestinal/Nutrition: NPO with sips and ice. Monitor bowel function. On IV Protonix for hx of GIB.   Renal/Genitourinary: f/u nephrology. Last HD 10/25. Possible HD 10/27 vs 10/28 per nephrology. Monitor U/O. Possible D/C painting 10/26.  Hematologic: Hgb stable. Heparin gtt started this AM. WIll transition to home Eliquis in the next couple of days. Trend H/H.  Infectious Disease: Clindamycin until 10/28 for surgical prophylaxis.   Lines/Tubes: PIV and Painting catheter  Endocrine: None  Disposition: SICU. Possible Downgrade tomorrow 10/27

## 2021-10-27 NOTE — CHART NOTE - NSCHARTNOTEFT_GEN_A_CORE
Patient downgraded from SICU pod#1 s/p exlap with small bowel resection. Patient with no acute events, tolerating sips and chips. Midline was not working and a 20 gauge IV placed with ultrasound. Patient has no complaints, no pain issues, no flatus/BM as of yet, NG tube in place, painting  draining. Dialysis schedule as per nephrology. Patient will remain with SIPs and chips until bowel function returns.   neuro: pain control  cardiac: continue heparin gtt, monitor PTT  respiratory: good pulmonary toliet, incentive spirometer, encourage oob  GI: serial abdominal exams, continue prevena vac, encourage oob, monitor for return of bowel function, sips and chips for now  : continue painting? HD per nephrology   lines: PIV x2   Dispo : pending Patient downgraded from SICU pod#1 s/p exlap with small bowel resection. Patient with no acute events, tolerating sips and chips. Midline was not working and a 20 gauge IV placed with ultrasound. Patient has no complaints, no pain issues, no flatus/BM as of yet, NG tube in place, painting  draining. Dialysis schedule as per nephrology. Patient will remain with SIPs and chips until bowel function returns.   neuro: pain control  cardiac: continue heparin gtt, monitor PTT  respiratory: good pulmonary toliet, incentive spirometer, encourage oob  GI: serial abdominal exams, continue prevena vac, encourage oob, monitor for return of bowel function, sips and chips for now  : continue painting for urinary retention, flomax started TOV 10/29? HD per nephrology   lines: PIV x2   Dispo : pending

## 2021-10-27 NOTE — DIETITIAN INITIAL EVALUATION ADULT. - ETIOLOGY
Related to inadequate protein energy intake with altered GI function in setting of SBO, s/p ex-lap w/ RENEA and SBR w/ primary anastomosis and 2 layer repair of 2 enterotomy' s

## 2021-10-28 LAB
APTT BLD: 59.8 SEC — HIGH (ref 27.5–35.5)
HCT VFR BLD CALC: 31.8 % — LOW (ref 39–50)
HGB BLD-MCNC: 10.2 G/DL — LOW (ref 13–17)
MCHC RBC-ENTMCNC: 29.3 PG — SIGNIFICANT CHANGE UP (ref 27–34)
MCHC RBC-ENTMCNC: 32.1 GM/DL — SIGNIFICANT CHANGE UP (ref 32–36)
MCV RBC AUTO: 91.4 FL — SIGNIFICANT CHANGE UP (ref 80–100)
PLATELET # BLD AUTO: 160 K/UL — SIGNIFICANT CHANGE UP (ref 150–400)
RBC # BLD: 3.48 M/UL — LOW (ref 4.2–5.8)
RBC # FLD: 14.1 % — SIGNIFICANT CHANGE UP (ref 10.3–14.5)
WBC # BLD: 6.39 K/UL — SIGNIFICANT CHANGE UP (ref 3.8–10.5)
WBC # FLD AUTO: 6.39 K/UL — SIGNIFICANT CHANGE UP (ref 3.8–10.5)

## 2021-10-28 PROCEDURE — 99232 SBSQ HOSP IP/OBS MODERATE 35: CPT

## 2021-10-28 PROCEDURE — 99231 SBSQ HOSP IP/OBS SF/LOW 25: CPT

## 2021-10-28 PROCEDURE — 99233 SBSQ HOSP IP/OBS HIGH 50: CPT

## 2021-10-28 RX ADMIN — Medication 100 MILLIGRAM(S): at 14:15

## 2021-10-28 RX ADMIN — Medication 15 MILLIGRAM(S): at 01:50

## 2021-10-28 RX ADMIN — HEPARIN SODIUM 1500 UNIT(S)/HR: 5000 INJECTION INTRAVENOUS; SUBCUTANEOUS at 09:50

## 2021-10-28 RX ADMIN — LEVETIRACETAM 400 MILLIGRAM(S): 250 TABLET, FILM COATED ORAL at 06:19

## 2021-10-28 RX ADMIN — PANTOPRAZOLE SODIUM 40 MILLIGRAM(S): 20 TABLET, DELAYED RELEASE ORAL at 12:47

## 2021-10-28 RX ADMIN — Medication 2.5 MILLIGRAM(S): at 06:31

## 2021-10-28 RX ADMIN — Medication 2.5 MILLIGRAM(S): at 00:30

## 2021-10-28 RX ADMIN — Medication 15 MILLIGRAM(S): at 06:19

## 2021-10-28 RX ADMIN — Medication 100 MILLIGRAM(S): at 06:19

## 2021-10-28 RX ADMIN — Medication 2.5 MILLIGRAM(S): at 12:48

## 2021-10-28 RX ADMIN — HYDROMORPHONE HYDROCHLORIDE 0.5 MILLIGRAM(S): 2 INJECTION INTRAMUSCULAR; INTRAVENOUS; SUBCUTANEOUS at 22:43

## 2021-10-28 RX ADMIN — Medication 15 MILLIGRAM(S): at 07:16

## 2021-10-28 RX ADMIN — LEVETIRACETAM 400 MILLIGRAM(S): 250 TABLET, FILM COATED ORAL at 17:20

## 2021-10-28 RX ADMIN — Medication 15 MILLIGRAM(S): at 00:31

## 2021-10-28 RX ADMIN — Medication 2.5 MILLIGRAM(S): at 17:20

## 2021-10-28 RX ADMIN — HYDROMORPHONE HYDROCHLORIDE 0.5 MILLIGRAM(S): 2 INJECTION INTRAMUSCULAR; INTRAVENOUS; SUBCUTANEOUS at 23:16

## 2021-10-28 RX ADMIN — Medication 100 MILLIGRAM(S): at 22:43

## 2021-10-28 RX ADMIN — TAMSULOSIN HYDROCHLORIDE 0.4 MILLIGRAM(S): 0.4 CAPSULE ORAL at 22:44

## 2021-10-28 NOTE — PROGRESS NOTE ADULT - SUBJECTIVE AND OBJECTIVE BOX
[facility] DIVISION OF KIDNEY DISEASES AND HYPERTENSION -- HEMODIALYSIS NOTE  --------------------------------------------------------------------------------  Chief Complaint: ESRD/Ongoing hemodialysis requirement    24 hour events/subjective:  s/p HD yesterday; tolerated 0.5 kg UF        PAST HISTORY  --------------------------------------------------------------------------------  No significant changes to PMH, PSH, FHx, SHx, unless otherwise noted    ALLERGIES & MEDICATIONS  --------------------------------------------------------------------------------  Allergies    penicillin (Other)        Standing Inpatient Medications  clindamycin IVPB 900 milliGRAM(s) IV Intermittent every 8 hours  heparin  Infusion.  Unit(s)/Hr IV Continuous <Continuous>  ketorolac   Injectable 15 milliGRAM(s) IV Push every 6 hours  levETIRAcetam  IVPB 750 milliGRAM(s) IV Intermittent every 12 hours  metoprolol tartrate Injectable 2.5 milliGRAM(s) IV Push every 6 hours  pantoprazole  Injectable 40 milliGRAM(s) IV Push daily  tamsulosin 0.4 milliGRAM(s) Oral at bedtime    PRN Inpatient Medications  HYDROmorphone  Injectable 0.5 milliGRAM(s) IV Push every 4 hours PRN  HYDROmorphone  Injectable 0.5 milliGRAM(s) IV Push every 4 hours PRN  HYDROmorphone  Injectable 1 milliGRAM(s) IV Push every 4 hours PRN      REVIEW OF SYSTEMS  --------------------------------------------------------------------------------  Gen: No weight changes, fatigue, fevers/chills, weakness  Skin: No rashes  Head/Eyes/Ears/Mouth: No headache  Respiratory: No dyspnea, cough,  CV: No chest pain, orthopnea  GI: No abdominal pain, diarrhea, constipation, nausea, vomiting,  MSK: No joint pain  Neuro: No dizziness/lightheadedness, weakness  Heme: No bleeding  Psych: No significant nervousness, anxiety, stress, depression    All other systems were reviewed and are negative, except as noted.    VITALS/PHYSICAL EXAM  --------------------------------------------------------------------------------  T(C): 36.3 (10-28-21 @ 08:34), Max: 36.9 (10-27-21 @ 16:33)  HR: 82 (10-28-21 @ 08:34) (77 - 93)  BP: 120/65 (10-28-21 @ 08:34) (117/73 - 179/62)  RR: 18 (10-28-21 @ 08:34) (15 - 24)  SpO2: 89% (10-28-21 @ 08:34) (89% - 99%)  Wt(kg): --        10-27-21 @ 07:01  -  10-28-21 @ 07:00  --------------------------------------------------------  IN: 255 mL / OUT: 1180 mL / NET: -925 mL    10-28-21 @ 07:01  -  10-28-21 @ 12:22  --------------------------------------------------------  IN: 280 mL / OUT: 0 mL / NET: 280 mL      Physical Exam:  	Gen: NAD  	HEENT: NGT to suction  	Pulm: CTA B/L  	CV: RRR, S1S2; no rub  	Abd: +BS, soft, nontender  	UE: Warm, no asterixis  	LE: Warm, no edema  	Neuro: No focal deficits  	Psych: Normal affect and mood  	Skin: Warm, without rashes  	Vascular access: LUE AVf with overlying bandage    LABS/STUDIES  --------------------------------------------------------------------------------              10.2   6.39  >-----------<  160      [10-28-21 @ 09:17]              31.8     137  |  92  |  70.3  ----------------------------<  110      [10-27-21 @ 05:00]  3.8   |  26.0  |  4.29        Ca     8.0     [10-27-21 @ 05:00]      Mg     2.0     [10-27-21 @ 05:00]      Phos  7.5     [10-27-21 @ 05:00]      PT/INR: PT 15.7 , INR 1.37       [10-27-21 @ 19:42]  PTT: 59.8       [10-28-21 @ 09:17]      Iron 29, TIBC 227, %sat 13      [12-05-20 @ 08:05]  Ferritin 1099      [12-05-20 @ 08:05]  TSH 2.16      [08-12-21 @ 12:05]  Lipid: chol --, , HDL --, LDL --      [11-15-20 @ 02:40]    HBsAb 322.1      [10-26-21 @ 01:09]  HBsAg Nonreact      [10-26-21 @ 01:09]  HCV 0.15, Nonreact      [10-26-21 @ 01:09]

## 2021-10-28 NOTE — PHYSICAL THERAPY INITIAL EVALUATION ADULT - LEVEL OF INDEPENDENCE: BED TO CHAIR, REHAB EVAL
Her weight and height are both off the charts, but when balancing the two out, her weight is still higher than I would like to see.    One approach: 3-2-1-0  Three meals a day  Two hours or less a day for screen time  One hour a day of activity  Zero calories from sugary drinks-sodas, iced teas, lemonades, juices    Another option to consider: Keep total sugar in a day to under 35 grams (this is the added sugars on the food label). Fruits are okay to have!!  
minimum assist (75% patients effort)

## 2021-10-28 NOTE — PROGRESS NOTE ADULT - ASSESSMENT
61 year old male patient with a known history of psoriasis, Aortic dissection s/p emergent repair (2010), surgery complicated by CVA w/ residual left sided weakness and bowel ischemia s/p bowel resection and ostomy with reversal repair, ESRD on HD (Tu/Thur/Sat) s/p LUE AVF, HTN, seizure disorder, GIB s/p colonoscopy and AVMs cauterization 3/2021 and CAD s/p remote stent to LAD and recent CABG x 2, AI and MR s/p tAVR, tMVR (11/2020 Dr. Butler) with post-op pAfib/flutter who is s/p uncomplicated radiofrequency ablation atrial fibrillation and atrial flutter ([facility]/PVI, CTI and mitral flutter) on 9/14/21. Admitted now with SBO.     Interval Hx 10/23-24:  off eliquis, and on IV heparin. remains in SR.   NGT in place, started to vomit around noon time, not feeling good. No obvious bleeding. Still did not pass gas and no BM, surgery is following.    Interval Hx 10/24-28:  Underwent surgery for his SBO, heparin was interrupted and now resumed. Still NPO with NGT in place. Feels better. No CP, SOB or palpitation. Tele showed no events, in sinus    1. Bowl obstruction. s/p SB resection, back on IV heparin. Still NPO with NGT in place.   - C/W Ib heparin. This can be stopped anytime if another surgical intervention is needed. Would recommend to change to his home eliquis when can tolerate PO and chance of needing another intervention is small. Would defer timing to surgical team.       2. AFib, s/p AF ablation on 9/14/2021, remains in SR. and denies any palpitation, CP, dizziness, or SOB  - can c/w iv metoprolol as is for now, HOLD if bradycardia (HR < 50 while awake or <40 while asleep) or hypotension  - change to home oral metoprolol when can eat and drink    EPS will sign off, call us if needed. outpatient routine follow up

## 2021-10-28 NOTE — PROGRESS NOTE ADULT - ASSESSMENT
1) ESRD on HD  2) SBo s/p small bowel resection  3) Anemia of CKD  4) Urinary retention    HD tomorrow  NGT+, NPO- minimal UF as tolerated  Hb at goal- continue maintenance REINALDO  Non oliguric, s/p painting- TOV when appropriate

## 2021-10-28 NOTE — PROGRESS NOTE ADULT - ASSESSMENT
Patient is a 62 y/o M presenting with SBO now pod#2 of ex-lap with sbr    neuro: pain control  cardiac: continue heparin gtt, monitor PTT, once patient is placed back on diet can transition back to eliquis  respiratory: good pulmonary toliet, incentive spirometer, encourage oob  GI: serial abdominal exams, continue prevena vac, encourage oob, monitor for return of bowel function, sips and chips for now  : continue painting for urinary retention, flomax started TOV 10/29? HD per nephrology   lines: PIV x2   Dispo : pending.

## 2021-10-28 NOTE — PROGRESS NOTE ADULT - SUBJECTIVE AND OBJECTIVE BOX
SUBJECTIVE/24 hour events:  Patient is a 61yMale presenting with SBO now pod#2 of ex-lap with sbr. Patient went to the SICU post-op and downgraded last night with no acute events, midline removed and PIV placed, no bowel function as of yet, pain is controlled on current regimen, tolerating HD. Patient went into retention, started on flomax will try tov on 10/29. Once patient has return of bowel function can advance diet and dispo can be determined      Vital Signs Last 24 Hrs  T(C): 36.5 (28 Oct 2021 00:23), Max: 36.9 (27 Oct 2021 16:33)  T(F): 97.7 (28 Oct 2021 00:23), Max: 98.4 (27 Oct 2021 16:33)  HR: 83 (28 Oct 2021 00:23) (77 - 90)  BP: 147/70 (28 Oct 2021 00:23) (117/73 - 179/62)  BP(mean): 79 (27 Oct 2021 17:00) (76 - 96)  RR: 18 (28 Oct 2021 00:23) (14 - 27)  SpO2: 96% (28 Oct 2021 00:23) (90% - 100%)  Drug Dosing Weight  Height (cm): 172.7 (26 Oct 2021 10:12)  Weight (kg): 84.2 (26 Oct 2021 10:12)  BMI (kg/m2): 28.2 (26 Oct 2021 10:12)  BSA (m2): 1.98 (26 Oct 2021 10:12)  I&O's Detail    26 Oct 2021 07:01  -  27 Oct 2021 07:00  --------------------------------------------------------  IN:    Heparin Infusion: 45 mL    IV PiggyBack: 200 mL    IV PiggyBack: 100 mL    IV PiggyBack: 300 mL    Oral Fluid: 740 mL    sodium chloride 0.9% w/ Additives: 210 mL  Total IN: 1595 mL    OUT:    Indwelling Catheter - Urethral (mL): 1405 mL    Nasogastric/Oral tube (mL): 700 mL  Total OUT: 2105 mL    Total NET: -510 mL      27 Oct 2021 07:01  -  28 Oct 2021 03:31  --------------------------------------------------------  IN:    Heparin Infusion: 105 mL    IV PiggyBack: 100 mL  Total IN: 205 mL    OUT:    Indwelling Catheter - Urethral (mL): 580 mL    Other (mL): 500 mL  Total OUT: 1080 mL    Total NET: -875 mL        Allergies    penicillin (Other)    Intolerances                              11.8   8.19  )-----------( 188      ( 27 Oct 2021 12:26 )             35.8   10-27    137  |  92<L>  |  70.3<H>  ----------------------------<  110<H>  3.8   |  26.0  |  4.29<H>    Ca    8.0<L>      27 Oct 2021 05:00  Phos  7.5     10-27  Mg     2.0     10-27    PT/INR - ( 27 Oct 2021 19:42 )   PT: 15.7 sec;   INR: 1.37 ratio         PTT - ( 27 Oct 2021 19:42 )  PTT:68.5 sec    ROS:    PHYSICAL EXAM:  Constitutional: in good spirits   Respiratory: no respiratory distress, no dyspnea, no supplemental o2 needed  Cardiovascular: NSR  Gastrointestinal: abdomen softly distended, mild ttp, prevena vac well seated to midline surgical incision   Genitourinary: painting in place draining   Neurological: A&OX3   Skin: warm, dry and no rashes         MEDICATIONS  (STANDING):  clindamycin IVPB 900 milliGRAM(s) IV Intermittent every 8 hours  heparin  Infusion.  Unit(s)/Hr (15 mL/Hr) IV Continuous <Continuous>  ketorolac   Injectable 15 milliGRAM(s) IV Push every 6 hours  levETIRAcetam  IVPB 750 milliGRAM(s) IV Intermittent every 12 hours  metoprolol tartrate Injectable 2.5 milliGRAM(s) IV Push every 6 hours  pantoprazole  Injectable 40 milliGRAM(s) IV Push daily  tamsulosin 0.4 milliGRAM(s) Oral at bedtime    MEDICATIONS  (PRN):  HYDROmorphone  Injectable 0.5 milliGRAM(s) IV Push every 4 hours PRN Breakthrough Pain  HYDROmorphone  Injectable 0.5 milliGRAM(s) IV Push every 4 hours PRN Moderate Pain (4 - 6)  HYDROmorphone  Injectable 1 milliGRAM(s) IV Push every 4 hours PRN Severe Pain (7 - 10)      RADIOLOGY STUDIES:    CULTURES:

## 2021-10-28 NOTE — PROGRESS NOTE ADULT - SUBJECTIVE AND OBJECTIVE BOX
Patient well known to EP service from previous admissions:   In summary:     61 year old male patient with a known history of psoriasis, former cocaine abuse (quit 2010), Aortic dissection s/p emergent repair (2010), surgery complicated by CVA w/ residual left sided weakness and bowel ischemia s/p bowel resection and ostomy with reversal repair, Covid infection, ESRD on HD (Tu/Thur/Sat) s/p LUE AVF, HTN, seizure disorder, GIB s/p colonoscopy and AVMs cauterization 3/2021 and CAD s/p remote stent to LAD and recent CABG x 2 ([facility], RPDA with Dr. Butler 11/2020), AI and MR s/p tAVR, tMVR (11/2020 Dr. Butler) with post-op pAfib/flutter (on Eliquis HFrEF (EF 45-50% 02/21), and s/p uncomplicated radiofrequency ablation atrial fibrillation and atrial flutter ([facility]/PVI, CTI and mitral flutter) on 9/14/21. Patient presents this admission with two days of constipation and worsening abdominal discomfort associated with nausea and vomiting. He reports these symptoms started suddenly. He admits that after meals he wasn't able to keep them down and would throw up his pills as well for these last two days. He has a history of these types of events since his abdominal surgery which are self limiting. Late last night he does admit to brief palpitations. No complaints of chest pain, dyspnea, fever or chills.    Interval Hx 10/23-24:  off eliquis, and on IV heparin. remains in SR. On iv metoprolol as he is NPO  NGT in place, started to vomit around noon time, not feeling good. No obvious bleeding. Still did not pass gas and no BM, surgery is following.    Interval Hx 10/24-28:  Underwent surgery for his SBO, heparin was interrupted and now resumed. Still NPO with NGT in place. Feels better. No CP, SOB or palpitation. Tele showed no events, in sinus    Previous Cardiology Summary  TTE: 11/2020: LVEF 40%. Moderately decreased global left ventricular systolic function. Moderately reduced RV systolic function. A bioprosthetic valve is present in the mitral position. Trace mitral valve regurgitation. Moderate tricuspid regurgitation. Bioprosthesis in the aortic position. Estimated pulmonary artery systolic pressure is 40.9 mmHg assuming a right atrial pressure of 8 mmHg, which is consistent with mild pulmonary hypertension. Mitral valve mean gradient is 2.2 mmHg consistent with mild mitral stenosis.    PAST MEDICAL & SURGICAL HISTORY:  CHF (congestive heart failure)  CVA (cerebral vascular accident)  Seizures  last seizure 10 years ago  HTN (hypertension)  Hyperlipemia  COVID-19  october 2020  Atrial fibrillation  ESRD on dialysis  Tu/Thurs/Sat  Former smoker  History of cocaine use  remote hx, currently sober  H/O aortic dissection  s/p repair (2010), surgery complicated by bowel ischemia s/p bowel resection and ostomy with reversal  H/O aortic valve insufficiency  Mitral regurgitation  GIB (gastrointestinal bleeding)  CAD (coronary artery disease)  s/p PCI 1998  and CABG x 2 11/2020  H/O colectomy  Status post double vessel coronary artery bypass  11/2020 Dr Butler  S/P AVR (aortic valve replacement)  (t)AVR 11/2020 Dr Butler  S/P MVR (mitral valve replacement)  (t)MVR 11/2020 Dr Butler  H/O aortic dissection  Type A; emergent repair 2010  AV fistula  LUE    REVIEW OF SYSTEMS  General: - fever or chills  Skin/Breast: - rashes  Ophthalmologic: - blurred vision	  ENMT: - sore throat  Respiratory and Thorax: - cough  Cardiovascular: - chest pain, + palpitations, - dyspnea  Gastrointestinal:	+N/V/C  Genitourinary: - dysuria  Musculoskeletal:	 - arthritis  Neurological: - weaknesses  Psychiatric: - depression	     MEDICATIONS  (STANDING):  clindamycin IVPB 900 milliGRAM(s) IV Intermittent every 8 hours  heparin  Infusion.  Unit(s)/Hr (15 mL/Hr) IV Continuous <Continuous>  ketorolac   Injectable 15 milliGRAM(s) IV Push every 6 hours  levETIRAcetam  IVPB 750 milliGRAM(s) IV Intermittent every 12 hours  metoprolol tartrate Injectable 2.5 milliGRAM(s) IV Push every 6 hours  pantoprazole  Injectable 40 milliGRAM(s) IV Push daily  tamsulosin 0.4 milliGRAM(s) Oral at bedtime    MEDICATIONS  (PRN):  HYDROmorphone  Injectable 0.5 milliGRAM(s) IV Push every 4 hours PRN Breakthrough Pain  HYDROmorphone  Injectable 0.5 milliGRAM(s) IV Push every 4 hours PRN Moderate Pain (4 - 6)  HYDROmorphone  Injectable 1 milliGRAM(s) IV Push every 4 hours PRN Severe Pain (7 - 10)      Allergies  penicillin (Other)    SOCIAL HISTORY: Former smoker and cocaine use. Both stopped 2010. 1 cup coffee daily, former drinker    FAMILY HISTORY:  FH: hypertension    Family history of cardiac disorder (Mother)  mother    Vital Signs Last 24 Hrs  T(C): 36.3 (10-28-21 @ 08:34), Max: 36.9 (10-27-21 @ 16:33)  T(F): 97.4 (10-28-21 @ 08:34), Max: 98.4 (10-27-21 @ 16:33)  HR: 82 (10-28-21 @ 08:34) (77 - 93)  BP: 120/65 (10-28-21 @ 08:34) (117/73 - 179/62)  BP(mean): 79 (10-27-21 @ 17:00) (76 - 96)  RR: 18 (10-28-21 @ 08:34) (15 - 24)  SpO2: 89% (10-28-21 @ 08:34) (89% - 99%)          Physical Exam:  Constitutional: AAOx3, NAD  Neck: supple, No JVD, +NG tube with brown secretions at bedside.   Cardiovascular: +S1S2 RRR, no murmurs, rubs, gallops   Pulmonary: CTA b/l, unlabored, no wheezes, rales. rhonci  Extremities: no edema b/l, +distal pulses b/l  Neuro: non focal, speech clear, CORDOVA x 4    LABS:                        10.2   6.39  )-----------( 160      ( 28 Oct 2021 09:17 )             31.8   10-27    137  |  92<L>  |  70.3<H>  ----------------------------<  110<H>  3.8   |  26.0  |  4.29<H>    Ca    8.0<L>      27 Oct 2021 05:00  Phos  7.5     10-27  Mg     2.0     10-27          RADIOLOGY & ADDITIONAL STUDIES:  CT abdomen 10/23/21  IMPRESSION:  Small bowel obstruction with transition point in the left midabdomen. No evidence of bowel pneumatosis or ascites.    EKG 10/23: SR at 83bpm; PAC; QRSD 102ms

## 2021-10-29 LAB
ANION GAP SERPL CALC-SCNC: 18 MMOL/L — HIGH (ref 5–17)
ANION GAP SERPL CALC-SCNC: 21 MMOL/L — HIGH (ref 5–17)
APTT BLD: 61.1 SEC — HIGH (ref 27.5–35.5)
BASOPHILS # BLD AUTO: 0 K/UL — SIGNIFICANT CHANGE UP (ref 0–0.2)
BASOPHILS NFR BLD AUTO: 0 % — SIGNIFICANT CHANGE UP (ref 0–2)
BUN SERPL-MCNC: 38.1 MG/DL — HIGH (ref 8–20)
BUN SERPL-MCNC: 74.1 MG/DL — HIGH (ref 8–20)
CALCIUM SERPL-MCNC: 8.6 MG/DL — SIGNIFICANT CHANGE UP (ref 8.6–10.2)
CALCIUM SERPL-MCNC: 8.9 MG/DL — SIGNIFICANT CHANGE UP (ref 8.6–10.2)
CHLORIDE SERPL-SCNC: 88 MMOL/L — LOW (ref 98–107)
CHLORIDE SERPL-SCNC: 94 MMOL/L — LOW (ref 98–107)
CO2 SERPL-SCNC: 23 MMOL/L — SIGNIFICANT CHANGE UP (ref 22–29)
CO2 SERPL-SCNC: 25 MMOL/L — SIGNIFICANT CHANGE UP (ref 22–29)
CREAT SERPL-MCNC: 2.09 MG/DL — HIGH (ref 0.5–1.3)
CREAT SERPL-MCNC: 3.31 MG/DL — HIGH (ref 0.5–1.3)
ELLIPTOCYTES BLD QL SMEAR: SLIGHT — SIGNIFICANT CHANGE UP
EOSINOPHIL # BLD AUTO: 0.07 K/UL — SIGNIFICANT CHANGE UP (ref 0–0.5)
EOSINOPHIL NFR BLD AUTO: 0.9 % — SIGNIFICANT CHANGE UP (ref 0–6)
GIANT PLATELETS BLD QL SMEAR: PRESENT — SIGNIFICANT CHANGE UP
GLUCOSE BLDC GLUCOMTR-MCNC: 103 MG/DL — HIGH (ref 70–99)
GLUCOSE SERPL-MCNC: 112 MG/DL — HIGH (ref 70–99)
GLUCOSE SERPL-MCNC: 118 MG/DL — HIGH (ref 70–99)
HCT VFR BLD CALC: 28 % — LOW (ref 39–50)
HGB BLD-MCNC: 9.3 G/DL — LOW (ref 13–17)
LYMPHOCYTES # BLD AUTO: 0.99 K/UL — LOW (ref 1–3.3)
LYMPHOCYTES # BLD AUTO: 13 % — SIGNIFICANT CHANGE UP (ref 13–44)
MAGNESIUM SERPL-MCNC: 2.6 MG/DL — SIGNIFICANT CHANGE UP (ref 1.6–2.6)
MANUAL SMEAR VERIFICATION: SIGNIFICANT CHANGE UP
MCHC RBC-ENTMCNC: 29.7 PG — SIGNIFICANT CHANGE UP (ref 27–34)
MCHC RBC-ENTMCNC: 33.2 GM/DL — SIGNIFICANT CHANGE UP (ref 32–36)
MCV RBC AUTO: 89.5 FL — SIGNIFICANT CHANGE UP (ref 80–100)
MONOCYTES # BLD AUTO: 0.59 K/UL — SIGNIFICANT CHANGE UP (ref 0–0.9)
MONOCYTES NFR BLD AUTO: 7.8 % — SIGNIFICANT CHANGE UP (ref 2–14)
NEUTROPHILS # BLD AUTO: 5.89 K/UL — SIGNIFICANT CHANGE UP (ref 1.8–7.4)
NEUTROPHILS NFR BLD AUTO: 76.5 % — SIGNIFICANT CHANGE UP (ref 43–77)
NEUTS BAND # BLD: 0.9 % — SIGNIFICANT CHANGE UP (ref 0–8)
OVALOCYTES BLD QL SMEAR: SLIGHT — SIGNIFICANT CHANGE UP
PLAT MORPH BLD: NORMAL — SIGNIFICANT CHANGE UP
PLATELET # BLD AUTO: 199 K/UL — SIGNIFICANT CHANGE UP (ref 150–400)
POIKILOCYTOSIS BLD QL AUTO: SLIGHT — SIGNIFICANT CHANGE UP
POLYCHROMASIA BLD QL SMEAR: SLIGHT — SIGNIFICANT CHANGE UP
POTASSIUM SERPL-MCNC: 3.5 MMOL/L — SIGNIFICANT CHANGE UP (ref 3.5–5.3)
POTASSIUM SERPL-MCNC: 4.2 MMOL/L — SIGNIFICANT CHANGE UP (ref 3.5–5.3)
POTASSIUM SERPL-SCNC: 3.5 MMOL/L — SIGNIFICANT CHANGE UP (ref 3.5–5.3)
POTASSIUM SERPL-SCNC: 4.2 MMOL/L — SIGNIFICANT CHANGE UP (ref 3.5–5.3)
RBC # BLD: 3.13 M/UL — LOW (ref 4.2–5.8)
RBC # FLD: 14.3 % — SIGNIFICANT CHANGE UP (ref 10.3–14.5)
RBC BLD AUTO: ABNORMAL
SODIUM SERPL-SCNC: 131 MMOL/L — LOW (ref 135–145)
SODIUM SERPL-SCNC: 138 MMOL/L — SIGNIFICANT CHANGE UP (ref 135–145)
VARIANT LYMPHS # BLD: 0.9 % — SIGNIFICANT CHANGE UP (ref 0–6)
WBC # BLD: 7.61 K/UL — SIGNIFICANT CHANGE UP (ref 3.8–10.5)
WBC # FLD AUTO: 7.61 K/UL — SIGNIFICANT CHANGE UP (ref 3.8–10.5)

## 2021-10-29 PROCEDURE — 90937 HEMODIALYSIS REPEATED EVAL: CPT

## 2021-10-29 PROCEDURE — 74018 RADEX ABDOMEN 1 VIEW: CPT | Mod: 26

## 2021-10-29 PROCEDURE — 99232 SBSQ HOSP IP/OBS MODERATE 35: CPT

## 2021-10-29 PROCEDURE — 71045 X-RAY EXAM CHEST 1 VIEW: CPT | Mod: 26

## 2021-10-29 RX ORDER — HYDROMORPHONE HYDROCHLORIDE 2 MG/ML
0.5 INJECTION INTRAMUSCULAR; INTRAVENOUS; SUBCUTANEOUS EVERY 4 HOURS
Refills: 0 | Status: DISCONTINUED | OUTPATIENT
Start: 2021-10-29 | End: 2021-10-31

## 2021-10-29 RX ORDER — ACETAMINOPHEN 500 MG
1000 TABLET ORAL ONCE
Refills: 0 | Status: COMPLETED | OUTPATIENT
Start: 2021-10-29 | End: 2021-10-31

## 2021-10-29 RX ORDER — SODIUM CHLORIDE 9 MG/ML
1000 INJECTION INTRAMUSCULAR; INTRAVENOUS; SUBCUTANEOUS
Refills: 0 | Status: DISCONTINUED | OUTPATIENT
Start: 2021-10-29 | End: 2021-10-31

## 2021-10-29 RX ORDER — KETOROLAC TROMETHAMINE 30 MG/ML
15 SYRINGE (ML) INJECTION EVERY 6 HOURS
Refills: 0 | Status: DISCONTINUED | OUTPATIENT
Start: 2021-10-29 | End: 2021-10-31

## 2021-10-29 RX ORDER — POTASSIUM CHLORIDE 20 MEQ
10 PACKET (EA) ORAL
Refills: 0 | Status: COMPLETED | OUTPATIENT
Start: 2021-10-29 | End: 2021-10-29

## 2021-10-29 RX ADMIN — HYDROMORPHONE HYDROCHLORIDE 0.5 MILLIGRAM(S): 2 INJECTION INTRAMUSCULAR; INTRAVENOUS; SUBCUTANEOUS at 05:08

## 2021-10-29 RX ADMIN — TAMSULOSIN HYDROCHLORIDE 0.4 MILLIGRAM(S): 0.4 CAPSULE ORAL at 21:35

## 2021-10-29 RX ADMIN — HYDROMORPHONE HYDROCHLORIDE 0.5 MILLIGRAM(S): 2 INJECTION INTRAMUSCULAR; INTRAVENOUS; SUBCUTANEOUS at 08:22

## 2021-10-29 RX ADMIN — Medication 15 MILLIGRAM(S): at 21:50

## 2021-10-29 RX ADMIN — HYDROMORPHONE HYDROCHLORIDE 0.5 MILLIGRAM(S): 2 INJECTION INTRAMUSCULAR; INTRAVENOUS; SUBCUTANEOUS at 22:49

## 2021-10-29 RX ADMIN — Medication 15 MILLIGRAM(S): at 21:35

## 2021-10-29 RX ADMIN — HYDROMORPHONE HYDROCHLORIDE 0.5 MILLIGRAM(S): 2 INJECTION INTRAMUSCULAR; INTRAVENOUS; SUBCUTANEOUS at 11:23

## 2021-10-29 RX ADMIN — LEVETIRACETAM 400 MILLIGRAM(S): 250 TABLET, FILM COATED ORAL at 05:08

## 2021-10-29 RX ADMIN — HYDROMORPHONE HYDROCHLORIDE 0.5 MILLIGRAM(S): 2 INJECTION INTRAMUSCULAR; INTRAVENOUS; SUBCUTANEOUS at 15:28

## 2021-10-29 RX ADMIN — Medication 2.5 MILLIGRAM(S): at 18:04

## 2021-10-29 RX ADMIN — HYDROMORPHONE HYDROCHLORIDE 0.5 MILLIGRAM(S): 2 INJECTION INTRAMUSCULAR; INTRAVENOUS; SUBCUTANEOUS at 05:23

## 2021-10-29 RX ADMIN — Medication 2.5 MILLIGRAM(S): at 21:36

## 2021-10-29 RX ADMIN — HYDROMORPHONE HYDROCHLORIDE 0.5 MILLIGRAM(S): 2 INJECTION INTRAMUSCULAR; INTRAVENOUS; SUBCUTANEOUS at 08:07

## 2021-10-29 RX ADMIN — LEVETIRACETAM 400 MILLIGRAM(S): 250 TABLET, FILM COATED ORAL at 18:03

## 2021-10-29 RX ADMIN — PANTOPRAZOLE SODIUM 40 MILLIGRAM(S): 20 TABLET, DELAYED RELEASE ORAL at 13:41

## 2021-10-29 RX ADMIN — HYDROMORPHONE HYDROCHLORIDE 0.5 MILLIGRAM(S): 2 INJECTION INTRAMUSCULAR; INTRAVENOUS; SUBCUTANEOUS at 11:09

## 2021-10-29 RX ADMIN — Medication 2.5 MILLIGRAM(S): at 13:41

## 2021-10-29 RX ADMIN — HEPARIN SODIUM 1500 UNIT(S)/HR: 5000 INJECTION INTRAVENOUS; SUBCUTANEOUS at 08:02

## 2021-10-29 RX ADMIN — HYDROMORPHONE HYDROCHLORIDE 0.5 MILLIGRAM(S): 2 INJECTION INTRAMUSCULAR; INTRAVENOUS; SUBCUTANEOUS at 16:15

## 2021-10-29 RX ADMIN — SODIUM CHLORIDE 80 MILLILITER(S): 9 INJECTION INTRAMUSCULAR; INTRAVENOUS; SUBCUTANEOUS at 18:03

## 2021-10-29 RX ADMIN — HYDROMORPHONE HYDROCHLORIDE 0.5 MILLIGRAM(S): 2 INJECTION INTRAMUSCULAR; INTRAVENOUS; SUBCUTANEOUS at 23:04

## 2021-10-29 NOTE — PROGRESS NOTE ADULT - SUBJECTIVE AND OBJECTIVE BOX
Vital Signs Last 24 Hrs  T(C): 37 (29 Oct 2021 09:00), Max: 37 (29 Oct 2021 09:00)  T(F): 98.6 (29 Oct 2021 09:00), Max: 98.6 (29 Oct 2021 09:00)  HR: 93 (29 Oct 2021 09:00) (89 - 107)  BP: 110/55 (29 Oct 2021 09:00) (100/67 - 146/61)  RR: 18 (29 Oct 2021 09:00) (18 - 18)  SpO2: 93% (29 Oct 2021 09:00) (91% - 93%)  Pt is seen and examined on dialysis. No symptoms. Hemodynamics stable. Tolerating dialysis and ultrafiltration.  Pre Laboratory values personally reviewed by me.  Dialysis adjusted appropriately based on current values.  Will continue the current medical management.  Next hemodialysis as scheduled.  Discussed with nursing, primary care team.

## 2021-10-29 NOTE — PROGRESS NOTE ADULT - ASSESSMENT
Patient is a 62 y/o M presenting with SBO now s/p ex-lap with sbr    neuro: pain control  cardiac: continue heparin gtt, monitor PTT, once patient is placed back on diet can transition back to eliquis  respiratory: good pulmonary toliet, incentive spirometer, encourage oob  GI: serial abdominal exam, encourage oob, monitor for return of bowel function, NGT to gravity  DC painting today  continue dialysis  Dispo : pending PT progress Patient is a 60 y/o M presenting with SBO now s/p ex-lap with sbr    neuro: pain control  cardiac: continue heparin gtt, monitor PTT, once patient is placed back on diet can transition back to eliquis  respiratory: good pulmonary toliet, incentive spirometer, encourage oob  GI: serial abdominal exam, encourage oob, monitor for return of bowel function, NGT to scution  DC painting today  continue dialysis  Dispo : pending PT progress

## 2021-10-29 NOTE — PROGRESS NOTE ADULT - SUBJECTIVE AND OBJECTIVE BOX
INTERVAL HPI/OVERNIGHT EVENTS:    Patient seen this AM with no acute events overnight. Patient without any acute complaints at this time. Patients' pain is well controlled on current pain regiment. NGT with continued low output. No bowel function. Remains hemodynamically stable and denies fevers, chills. No CP or SOB       MEDICATIONS  (STANDING):  heparin  Infusion.  Unit(s)/Hr (15 mL/Hr) IV Continuous <Continuous>  levETIRAcetam  IVPB 750 milliGRAM(s) IV Intermittent every 12 hours  metoprolol tartrate Injectable 2.5 milliGRAM(s) IV Push every 6 hours  pantoprazole  Injectable 40 milliGRAM(s) IV Push daily  tamsulosin 0.4 milliGRAM(s) Oral at bedtime    MEDICATIONS  (PRN):  HYDROmorphone  Injectable 0.5 milliGRAM(s) IV Push every 4 hours PRN Breakthrough Pain  HYDROmorphone  Injectable 0.5 milliGRAM(s) IV Push every 4 hours PRN Moderate Pain (4 - 6)  HYDROmorphone  Injectable 1 milliGRAM(s) IV Push every 4 hours PRN Severe Pain (7 - 10)      Vital Signs Last 24 Hrs  T(C): 36.7 (28 Oct 2021 22:40), Max: 36.7 (28 Oct 2021 06:02)  T(F): 98 (28 Oct 2021 22:40), Max: 98 (28 Oct 2021 06:02)  HR: 107 (29 Oct 2021 00:37) (82 - 107)  BP: 106/72 (29 Oct 2021 00:37) (106/72 - 132/72)  BP(mean): --  RR: 18 (29 Oct 2021 00:37) (18 - 18)  SpO2: 93% (29 Oct 2021 00:37) (89% - 94%)    Physical Exam:  GEN: NAD, laying in bed, appears stated age  HEENT: NCAT, clear oral mucosa, normal conjunctiva. NGT  in place  Chest: non-tender  CV:  non-tachycardic, 2+ radial pulse  Pulm: no increased work of breathing, no conversational dyspnea  GI: soft, nondistended, tender worse in lower abdominal quadrants  MSK: moving all extremities     I&O's Detail    27 Oct 2021 07:01  -  28 Oct 2021 07:00  --------------------------------------------------------  IN:    Heparin Infusion: 105 mL    IV PiggyBack: 50 mL    IV PiggyBack: 100 mL  Total IN: 255 mL    OUT:    Indwelling Catheter - Urethral (mL): 680 mL    Other (mL): 500 mL  Total OUT: 1180 mL    Total NET: -925 mL      28 Oct 2021 07:01  -  29 Oct 2021 04:13  --------------------------------------------------------  IN:    Heparin Infusion: 105 mL    IV PiggyBack: 100 mL  Total IN: 205 mL    OUT:    Indwelling Catheter - Urethral (mL): 450 mL    Nasogastric/Oral tube (mL): 300 mL  Total OUT: 750 mL    Total NET: -545 mL          LABS:                        10.2   6.39  )-----------( 160      ( 28 Oct 2021 09:17 )             31.8     10-27    137  |  92<L>  |  70.3<H>  ----------------------------<  110<H>  3.8   |  26.0  |  4.29<H>    Ca    8.0<L>      27 Oct 2021 05:00  Phos  7.5     10-27  Mg     2.0     10-27      PT/INR - ( 27 Oct 2021 19:42 )   PT: 15.7 sec;   INR: 1.37 ratio         PTT - ( 28 Oct 2021 09:17 )  PTT:59.8 sec      RADIOLOGY & ADDITIONAL STUDIES:

## 2021-10-30 LAB
ANION GAP SERPL CALC-SCNC: 24 MMOL/L — HIGH (ref 5–17)
ANISOCYTOSIS BLD QL: SLIGHT — SIGNIFICANT CHANGE UP
APTT BLD: 26.6 SEC — LOW (ref 27.5–35.5)
APTT BLD: 29.5 SEC — SIGNIFICANT CHANGE UP (ref 27.5–35.5)
BASOPHILS # BLD AUTO: 0 K/UL — SIGNIFICANT CHANGE UP (ref 0–0.2)
BASOPHILS NFR BLD AUTO: 0 % — SIGNIFICANT CHANGE UP (ref 0–2)
BUN SERPL-MCNC: 62.1 MG/DL — HIGH (ref 8–20)
CALCIUM SERPL-MCNC: 8.8 MG/DL — SIGNIFICANT CHANGE UP (ref 8.6–10.2)
CHLORIDE SERPL-SCNC: 92 MMOL/L — LOW (ref 98–107)
CO2 SERPL-SCNC: 22 MMOL/L — SIGNIFICANT CHANGE UP (ref 22–29)
CREAT SERPL-MCNC: 3.3 MG/DL — HIGH (ref 0.5–1.3)
EOSINOPHIL # BLD AUTO: 0.18 K/UL — SIGNIFICANT CHANGE UP (ref 0–0.5)
EOSINOPHIL NFR BLD AUTO: 0.9 % — SIGNIFICANT CHANGE UP (ref 0–6)
GIANT PLATELETS BLD QL SMEAR: PRESENT — SIGNIFICANT CHANGE UP
GLUCOSE SERPL-MCNC: 94 MG/DL — SIGNIFICANT CHANGE UP (ref 70–99)
HCT VFR BLD CALC: 24.2 % — LOW (ref 39–50)
HGB BLD-MCNC: 7.8 G/DL — LOW (ref 13–17)
LYMPHOCYTES # BLD AUTO: 11.1 % — LOW (ref 13–44)
LYMPHOCYTES # BLD AUTO: 2.19 K/UL — SIGNIFICANT CHANGE UP (ref 1–3.3)
MACROCYTES BLD QL: SLIGHT — SIGNIFICANT CHANGE UP
MAGNESIUM SERPL-MCNC: 2.5 MG/DL — SIGNIFICANT CHANGE UP (ref 1.6–2.6)
MANUAL SMEAR VERIFICATION: SIGNIFICANT CHANGE UP
MCHC RBC-ENTMCNC: 29.9 PG — SIGNIFICANT CHANGE UP (ref 27–34)
MCHC RBC-ENTMCNC: 32.2 GM/DL — SIGNIFICANT CHANGE UP (ref 32–36)
MCV RBC AUTO: 92.7 FL — SIGNIFICANT CHANGE UP (ref 80–100)
MICROCYTES BLD QL: SLIGHT — SIGNIFICANT CHANGE UP
MONOCYTES # BLD AUTO: 2.56 K/UL — HIGH (ref 0–0.9)
MONOCYTES NFR BLD AUTO: 13 % — SIGNIFICANT CHANGE UP (ref 2–14)
MYELOCYTES NFR BLD: 2.8 % — HIGH (ref 0–0)
NEUTROPHILS # BLD AUTO: 14.24 K/UL — HIGH (ref 1.8–7.4)
NEUTROPHILS NFR BLD AUTO: 72.2 % — SIGNIFICANT CHANGE UP (ref 43–77)
OVALOCYTES BLD QL SMEAR: SLIGHT — SIGNIFICANT CHANGE UP
PHOSPHATE SERPL-MCNC: 5.9 MG/DL — HIGH (ref 2.4–4.7)
PLAT MORPH BLD: NORMAL — SIGNIFICANT CHANGE UP
PLATELET # BLD AUTO: 268 K/UL — SIGNIFICANT CHANGE UP (ref 150–400)
POIKILOCYTOSIS BLD QL AUTO: SLIGHT — SIGNIFICANT CHANGE UP
POLYCHROMASIA BLD QL SMEAR: SLIGHT — SIGNIFICANT CHANGE UP
POTASSIUM SERPL-MCNC: 4.1 MMOL/L — SIGNIFICANT CHANGE UP (ref 3.5–5.3)
POTASSIUM SERPL-SCNC: 4.1 MMOL/L — SIGNIFICANT CHANGE UP (ref 3.5–5.3)
RBC # BLD: 2.61 M/UL — LOW (ref 4.2–5.8)
RBC # FLD: 14.5 % — SIGNIFICANT CHANGE UP (ref 10.3–14.5)
RBC BLD AUTO: ABNORMAL
SARS-COV-2 RNA SPEC QL NAA+PROBE: SIGNIFICANT CHANGE UP
SCHISTOCYTES BLD QL AUTO: SLIGHT — SIGNIFICANT CHANGE UP
SODIUM SERPL-SCNC: 138 MMOL/L — SIGNIFICANT CHANGE UP (ref 135–145)
TOXIC GRANULES BLD QL SMEAR: PRESENT — SIGNIFICANT CHANGE UP
WBC # BLD: 19.72 K/UL — HIGH (ref 3.8–10.5)
WBC # FLD AUTO: 19.72 K/UL — HIGH (ref 3.8–10.5)

## 2021-10-30 PROCEDURE — 74018 RADEX ABDOMEN 1 VIEW: CPT | Mod: 26

## 2021-10-30 PROCEDURE — 99233 SBSQ HOSP IP/OBS HIGH 50: CPT

## 2021-10-30 PROCEDURE — 99024 POSTOP FOLLOW-UP VISIT: CPT | Mod: GC

## 2021-10-30 RX ORDER — HEPARIN SODIUM 5000 [USP'U]/ML
6500 INJECTION INTRAVENOUS; SUBCUTANEOUS ONCE
Refills: 0 | Status: DISCONTINUED | OUTPATIENT
Start: 2021-10-30 | End: 2021-10-31

## 2021-10-30 RX ORDER — HEPARIN SODIUM 5000 [USP'U]/ML
6500 INJECTION INTRAVENOUS; SUBCUTANEOUS EVERY 6 HOURS
Refills: 0 | Status: DISCONTINUED | OUTPATIENT
Start: 2021-10-30 | End: 2021-10-31

## 2021-10-30 RX ORDER — HEPARIN SODIUM 5000 [USP'U]/ML
3000 INJECTION INTRAVENOUS; SUBCUTANEOUS EVERY 6 HOURS
Refills: 0 | Status: DISCONTINUED | OUTPATIENT
Start: 2021-10-30 | End: 2021-10-31

## 2021-10-30 RX ORDER — ONDANSETRON 8 MG/1
4 TABLET, FILM COATED ORAL ONCE
Refills: 0 | Status: COMPLETED | OUTPATIENT
Start: 2021-10-30 | End: 2021-10-30

## 2021-10-30 RX ADMIN — TAMSULOSIN HYDROCHLORIDE 0.4 MILLIGRAM(S): 0.4 CAPSULE ORAL at 22:05

## 2021-10-30 RX ADMIN — Medication 15 MILLIGRAM(S): at 03:44

## 2021-10-30 RX ADMIN — Medication 15 MILLIGRAM(S): at 23:23

## 2021-10-30 RX ADMIN — HYDROMORPHONE HYDROCHLORIDE 0.5 MILLIGRAM(S): 2 INJECTION INTRAMUSCULAR; INTRAVENOUS; SUBCUTANEOUS at 12:06

## 2021-10-30 RX ADMIN — HYDROMORPHONE HYDROCHLORIDE 0.5 MILLIGRAM(S): 2 INJECTION INTRAMUSCULAR; INTRAVENOUS; SUBCUTANEOUS at 17:20

## 2021-10-30 RX ADMIN — Medication 2.5 MILLIGRAM(S): at 23:23

## 2021-10-30 RX ADMIN — HEPARIN SODIUM 6500 UNIT(S): 5000 INJECTION INTRAVENOUS; SUBCUTANEOUS at 21:36

## 2021-10-30 RX ADMIN — HEPARIN SODIUM 5000 UNIT(S): 5000 INJECTION INTRAVENOUS; SUBCUTANEOUS at 12:05

## 2021-10-30 RX ADMIN — HYDROMORPHONE HYDROCHLORIDE 0.5 MILLIGRAM(S): 2 INJECTION INTRAMUSCULAR; INTRAVENOUS; SUBCUTANEOUS at 22:04

## 2021-10-30 RX ADMIN — HYDROMORPHONE HYDROCHLORIDE 0.5 MILLIGRAM(S): 2 INJECTION INTRAMUSCULAR; INTRAVENOUS; SUBCUTANEOUS at 07:46

## 2021-10-30 RX ADMIN — Medication 15 MILLIGRAM(S): at 03:29

## 2021-10-30 RX ADMIN — HYDROMORPHONE HYDROCHLORIDE 0.5 MILLIGRAM(S): 2 INJECTION INTRAMUSCULAR; INTRAVENOUS; SUBCUTANEOUS at 22:19

## 2021-10-30 RX ADMIN — Medication 2.5 MILLIGRAM(S): at 06:21

## 2021-10-30 RX ADMIN — LEVETIRACETAM 400 MILLIGRAM(S): 250 TABLET, FILM COATED ORAL at 23:22

## 2021-10-30 RX ADMIN — HYDROMORPHONE HYDROCHLORIDE 0.5 MILLIGRAM(S): 2 INJECTION INTRAMUSCULAR; INTRAVENOUS; SUBCUTANEOUS at 17:05

## 2021-10-30 RX ADMIN — Medication 15 MILLIGRAM(S): at 23:38

## 2021-10-30 RX ADMIN — PANTOPRAZOLE SODIUM 40 MILLIGRAM(S): 20 TABLET, DELAYED RELEASE ORAL at 12:00

## 2021-10-30 RX ADMIN — LEVETIRACETAM 400 MILLIGRAM(S): 250 TABLET, FILM COATED ORAL at 06:21

## 2021-10-30 RX ADMIN — HYDROMORPHONE HYDROCHLORIDE 0.5 MILLIGRAM(S): 2 INJECTION INTRAMUSCULAR; INTRAVENOUS; SUBCUTANEOUS at 02:49

## 2021-10-30 RX ADMIN — ONDANSETRON 4 MILLIGRAM(S): 8 TABLET, FILM COATED ORAL at 23:36

## 2021-10-30 RX ADMIN — SODIUM CHLORIDE 80 MILLILITER(S): 9 INJECTION INTRAMUSCULAR; INTRAVENOUS; SUBCUTANEOUS at 17:05

## 2021-10-30 RX ADMIN — HEPARIN SODIUM 1900 UNIT(S)/HR: 5000 INJECTION INTRAVENOUS; SUBCUTANEOUS at 12:02

## 2021-10-30 RX ADMIN — Medication 2.5 MILLIGRAM(S): at 17:05

## 2021-10-30 RX ADMIN — HYDROMORPHONE HYDROCHLORIDE 0.5 MILLIGRAM(S): 2 INJECTION INTRAMUSCULAR; INTRAVENOUS; SUBCUTANEOUS at 07:31

## 2021-10-30 RX ADMIN — HYDROMORPHONE HYDROCHLORIDE 0.5 MILLIGRAM(S): 2 INJECTION INTRAMUSCULAR; INTRAVENOUS; SUBCUTANEOUS at 02:34

## 2021-10-30 RX ADMIN — HYDROMORPHONE HYDROCHLORIDE 0.5 MILLIGRAM(S): 2 INJECTION INTRAMUSCULAR; INTRAVENOUS; SUBCUTANEOUS at 11:55

## 2021-10-30 RX ADMIN — HEPARIN SODIUM 2300 UNIT(S)/HR: 5000 INJECTION INTRAVENOUS; SUBCUTANEOUS at 21:32

## 2021-10-30 RX ADMIN — Medication 2.5 MILLIGRAM(S): at 11:57

## 2021-10-30 NOTE — PROGRESS NOTE ADULT - SUBJECTIVE AND OBJECTIVE BOX
[facility] DIVISION OF KIDNEY DISEASES AND HYPERTENSION -- HEMODIALYSIS NOTE  --------------------------------------------------------------------------------  Chief Complaint: ESRD/Ongoing hemodialysis requirement    24 hour events/subjective:  s/p HD yesterday; had 0.5 kg UF    PAST HISTORY  --------------------------------------------------------------------------------  No significant changes to PMH, PSH, FHx, SHx, unless otherwise noted    ALLERGIES & MEDICATIONS  --------------------------------------------------------------------------------  Allergies    penicillin (Other)    Intolerances      Standing Inpatient Medications  acetaminophen   IVPB .. 1000 milliGRAM(s) IV Intermittent once  heparin   Injectable 6500 Unit(s) IV Push once  heparin  Infusion.  Unit(s)/Hr IV Continuous <Continuous>  levETIRAcetam  IVPB 750 milliGRAM(s) IV Intermittent every 12 hours  metoprolol tartrate Injectable 2.5 milliGRAM(s) IV Push every 6 hours  pantoprazole  Injectable 40 milliGRAM(s) IV Push daily  sodium chloride 0.9%. 1000 milliLiter(s) IV Continuous <Continuous>  tamsulosin 0.4 milliGRAM(s) Oral at bedtime    PRN Inpatient Medications  heparin   Injectable 6500 Unit(s) IV Push every 6 hours PRN  heparin   Injectable 3000 Unit(s) IV Push every 6 hours PRN  HYDROmorphone  Injectable 0.5 milliGRAM(s) IV Push every 4 hours PRN  ketorolac   Injectable 15 milliGRAM(s) IV Push every 6 hours PRN      REVIEW OF SYSTEMS  --------------------------------------------------------------------------------  Gen: No weight changes, fatigue, fevers/chills, weakness  Skin: No rashes  Head/Eyes/Ears/Mouth: No headache  Respiratory: No dyspnea, cough,  CV: No chest pain, orthopnea  GI: No abdominal pain, diarrhea, constipation, nausea, vomiting,  MSK: No joint pain  Neuro: No dizziness/lightheadedness, weakness  Heme: No bleeding  Psych: No significant nervousness, anxiety, stress, depression    All other systems were reviewed and are negative, except as noted.    VITALS/PHYSICAL EXAM  --------------------------------------------------------------------------------  T(C): 36.6 (10-30-21 @ 07:56), Max: 36.7 (10-30-21 @ 04:00)  HR: 88 (10-30-21 @ 07:56) (85 - 100)  BP: 145/75 (10-30-21 @ 07:56) (114/59 - 145/75)  RR: 19 (10-30-21 @ 07:56) (18 - 19)  SpO2: 96% (10-30-21 @ 07:56) (90% - 97%)  Wt(kg): --        10-29-21 @ 07:01  -  10-30-21 @ 07:00  --------------------------------------------------------  IN: 440 mL / OUT: 500 mL / NET: -60 mL      Physical Exam:  	Gen: NAD  	HEENT: supple neck  	Pulm: CTA B/L  	CV: RRR, S1S2; no rub  	Abd: +BS, soft, surgical incision with staples  	UE: Warm  	LE: Warm, no edema  	Neuro: No focal deficits  	Psych: Normal affect and mood  	Skin: Warm, without rashes  	Vascular access: MICHAEL VELASCO    LABS/STUDIES  --------------------------------------------------------------------------------              7.8    19.72 >-----------<  268      [10-30-21 @ 10:47]              24.2     138  |  94  |  38.1  ----------------------------<  118      [10-29-21 @ 16:22]  4.2   |  23.0  |  2.09        Ca     8.9     [10-29-21 @ 16:22]      Mg     2.6     [10-29-21 @ 07:33]        PTT: 26.6       [10-30-21 @ 10:47]      Iron 29, TIBC 227, %sat 13      [12-05-20 @ 08:05]  Ferritin 1099      [12-05-20 @ 08:05]  TSH 2.16      [08-12-21 @ 12:05]  Lipid: chol --, , HDL --, LDL --      [11-15-20 @ 02:40]    HBsAb 322.1      [10-26-21 @ 01:09]  HBsAg Nonreact      [10-26-21 @ 01:09]  HCV 0.15, Nonreact      [10-26-21 @ 01:09]

## 2021-10-30 NOTE — PROGRESS NOTE ADULT - ASSESSMENT
Patient is a 62 y/o M presenting with SBO now pod#4 of ex-lap with sbr    neuro: pain control  cardiac: continue heparin gtt, monitor PTT, once patient is placed back on diet can transition back to eliquis  respiratory: good pulmonary toliet, incentive spirometer, encourage oob  GI: serial abdominal exams, continue prevena vac, encourage oob, monitor for return of bowel function, sips and chips for now  : continue painting for urinary retention, flomax started TOV tomorrow HD per nephrology   lines: PIV x2   Dispo : pending.

## 2021-10-30 NOTE — PROGRESS NOTE ADULT - SUBJECTIVE AND OBJECTIVE BOX
INTERVAL HPI/OVERNIGHT EVENTS: No acute events tonight. Denies any n/v,cp,sob,f/c,. Continues to not pass gas or have bm. Had hemodialysis earlier today and K was repleted.    STATUS POST: ex-lab with sbr    POST OPERATIVE DAY #: 4      MEDICATIONS  (STANDING):  acetaminophen   IVPB .. 1000 milliGRAM(s) IV Intermittent once  heparin  Infusion.  Unit(s)/Hr (15 mL/Hr) IV Continuous <Continuous>  levETIRAcetam  IVPB 750 milliGRAM(s) IV Intermittent every 12 hours  metoprolol tartrate Injectable 2.5 milliGRAM(s) IV Push every 6 hours  pantoprazole  Injectable 40 milliGRAM(s) IV Push daily  sodium chloride 0.9%. 1000 milliLiter(s) (80 mL/Hr) IV Continuous <Continuous>  tamsulosin 0.4 milliGRAM(s) Oral at bedtime    MEDICATIONS  (PRN):  HYDROmorphone  Injectable 0.5 milliGRAM(s) IV Push every 4 hours PRN Severe Pain (7 - 10)  ketorolac   Injectable 15 milliGRAM(s) IV Push every 6 hours PRN Mild Pain (1 - 3)      Vital Signs Last 24 Hrs  T(C): 36.5 (29 Oct 2021 15:29), Max: 37 (29 Oct 2021 09:00)  T(F): 97.7 (29 Oct 2021 15:29), Max: 98.6 (29 Oct 2021 09:00)  HR: 97 (29 Oct 2021 15:29) (93 - 100)  BP: 145/73 (29 Oct 2021 15:29) (100/67 - 146/61)  BP(mean): --  RR: 19 (29 Oct 2021 15:29) (18 - 19)  SpO2: 90% (29 Oct 2021 15:29) (90% - 97%)    Physical Exam:    Neurological:  No sensory/motor deficits    HEENT: PERRLA, EOMI, no drainage or redness    Neck: No bruits; no thyromegaly or nodules,  No JVD    Back: Normal spine flexure, No CVA tenderness, No deformity or limitation of movement    Respiratory: Breath Sounds equal & clear to auscultation, no accessory muscle use    Cardiovascular: Regular rate & rhythm, normal S1, S2; no murmurs, gallops or rubs    Gastrointestinal: Soft, non-tender, normal bowel sounds    Extremities: No peripheral edema, No cyanosis, clubbing     Vascular: Equal and normal pulses: 2+ peripheral pulses throughout    Musculoskeletal: No joint pain, swelling or deformity; no limitation of movement    Skin: No rashes      I&O's Detail    28 Oct 2021 07:01  -  29 Oct 2021 07:00  --------------------------------------------------------  IN:    Heparin Infusion: 300 mL    IV PiggyBack: 50 mL    IV PiggyBack: 200 mL  Total IN: 550 mL    OUT:    Indwelling Catheter - Urethral (mL): 450 mL    Nasogastric/Oral tube (mL): 900 mL  Total OUT: 1350 mL    Total NET: -800 mL      29 Oct 2021 07:01  -  30 Oct 2021 02:04  --------------------------------------------------------  IN:    Heparin Infusion: 180 mL    Oral Fluid: 100 mL    sodium chloride 0.9%: 160 mL  Total IN: 440 mL    OUT:    Nasogastric/Oral tube (mL): 0 mL    Other (mL): 500 mL    Voided (mL): 0 mL  Total OUT: 500 mL    Total NET: -60 mL          LABS:                        9.3    7.61  )-----------( 199      ( 29 Oct 2021 07:33 )             28.0     10-29    138  |  94<L>  |  38.1<H>  ----------------------------<  118<H>  4.2   |  23.0  |  2.09<H>    Ca    8.9      29 Oct 2021 16:22  Mg     2.6     10-29      PTT - ( 29 Oct 2021 07:33 )  PTT:61.1 sec      RADIOLOGY & ADDITIONAL STUDIES:

## 2021-10-31 ENCOUNTER — RESULT REVIEW (OUTPATIENT)
Age: 61
End: 2021-10-31

## 2021-10-31 LAB
ALBUMIN SERPL ELPH-MCNC: 3 G/DL — LOW (ref 3.3–5.2)
ALP SERPL-CCNC: 123 U/L — HIGH (ref 40–120)
ALT FLD-CCNC: 18 U/L — SIGNIFICANT CHANGE UP
ANION GAP SERPL CALC-SCNC: 17 MMOL/L — SIGNIFICANT CHANGE UP (ref 5–17)
ANION GAP SERPL CALC-SCNC: 19 MMOL/L — HIGH (ref 5–17)
ANISOCYTOSIS BLD QL: SLIGHT — SIGNIFICANT CHANGE UP
ANISOCYTOSIS BLD QL: SLIGHT — SIGNIFICANT CHANGE UP
APTT BLD: 151.5 SEC — CRITICAL HIGH (ref 27.5–35.5)
APTT BLD: 31.6 SEC — SIGNIFICANT CHANGE UP (ref 27.5–35.5)
AST SERPL-CCNC: 20 U/L — SIGNIFICANT CHANGE UP
BASOPHILS # BLD AUTO: 0 K/UL — SIGNIFICANT CHANGE UP (ref 0–0.2)
BASOPHILS # BLD AUTO: 0.22 K/UL — HIGH (ref 0–0.2)
BASOPHILS NFR BLD AUTO: 0 % — SIGNIFICANT CHANGE UP (ref 0–2)
BASOPHILS NFR BLD AUTO: 0.9 % — SIGNIFICANT CHANGE UP (ref 0–2)
BILIRUB SERPL-MCNC: 2.2 MG/DL — HIGH (ref 0.4–2)
BLD GP AB SCN SERPL QL: SIGNIFICANT CHANGE UP
BUN SERPL-MCNC: 73.1 MG/DL — HIGH (ref 8–20)
BUN SERPL-MCNC: 73.6 MG/DL — HIGH (ref 8–20)
CALCIUM SERPL-MCNC: 8.5 MG/DL — LOW (ref 8.6–10.2)
CALCIUM SERPL-MCNC: 8.7 MG/DL — SIGNIFICANT CHANGE UP (ref 8.6–10.2)
CHLORIDE SERPL-SCNC: 93 MMOL/L — LOW (ref 98–107)
CHLORIDE SERPL-SCNC: 94 MMOL/L — LOW (ref 98–107)
CO2 SERPL-SCNC: 23 MMOL/L — SIGNIFICANT CHANGE UP (ref 22–29)
CO2 SERPL-SCNC: 23 MMOL/L — SIGNIFICANT CHANGE UP (ref 22–29)
CREAT SERPL-MCNC: 2.49 MG/DL — HIGH (ref 0.5–1.3)
CREAT SERPL-MCNC: 2.74 MG/DL — HIGH (ref 0.5–1.3)
EOSINOPHIL # BLD AUTO: 0 K/UL — SIGNIFICANT CHANGE UP (ref 0–0.5)
EOSINOPHIL # BLD AUTO: 0.64 K/UL — HIGH (ref 0–0.5)
EOSINOPHIL NFR BLD AUTO: 0 % — SIGNIFICANT CHANGE UP (ref 0–6)
EOSINOPHIL NFR BLD AUTO: 2.6 % — SIGNIFICANT CHANGE UP (ref 0–6)
GAS PNL BLDA: SIGNIFICANT CHANGE UP
GIANT PLATELETS BLD QL SMEAR: PRESENT — SIGNIFICANT CHANGE UP
GIANT PLATELETS BLD QL SMEAR: PRESENT — SIGNIFICANT CHANGE UP
GLUCOSE SERPL-MCNC: 110 MG/DL — HIGH (ref 70–99)
GLUCOSE SERPL-MCNC: 113 MG/DL — HIGH (ref 70–99)
HCT VFR BLD CALC: 20.5 % — CRITICAL LOW (ref 39–50)
HCT VFR BLD CALC: 22.8 % — LOW (ref 39–50)
HGB BLD-MCNC: 6.6 G/DL — CRITICAL LOW (ref 13–17)
HGB BLD-MCNC: 7.2 G/DL — LOW (ref 13–17)
HYPOCHROMIA BLD QL: SLIGHT — SIGNIFICANT CHANGE UP
INR BLD: 1.43 RATIO — HIGH (ref 0.88–1.16)
LYMPHOCYTES # BLD AUTO: 1.21 K/UL — SIGNIFICANT CHANGE UP (ref 1–3.3)
LYMPHOCYTES # BLD AUTO: 1.93 K/UL — SIGNIFICANT CHANGE UP (ref 1–3.3)
LYMPHOCYTES # BLD AUTO: 4.6 % — LOW (ref 13–44)
LYMPHOCYTES # BLD AUTO: 7.8 % — LOW (ref 13–44)
MAGNESIUM SERPL-MCNC: 2.5 MG/DL — SIGNIFICANT CHANGE UP (ref 1.6–2.6)
MAGNESIUM SERPL-MCNC: 2.7 MG/DL — HIGH (ref 1.8–2.6)
MANUAL SMEAR VERIFICATION: SIGNIFICANT CHANGE UP
MANUAL SMEAR VERIFICATION: SIGNIFICANT CHANGE UP
MCHC RBC-ENTMCNC: 29.6 PG — SIGNIFICANT CHANGE UP (ref 27–34)
MCHC RBC-ENTMCNC: 29.8 PG — SIGNIFICANT CHANGE UP (ref 27–34)
MCHC RBC-ENTMCNC: 31.6 GM/DL — LOW (ref 32–36)
MCHC RBC-ENTMCNC: 32.2 GM/DL — SIGNIFICANT CHANGE UP (ref 32–36)
MCV RBC AUTO: 91.9 FL — SIGNIFICANT CHANGE UP (ref 80–100)
MCV RBC AUTO: 94.2 FL — SIGNIFICANT CHANGE UP (ref 80–100)
MICROCYTES BLD QL: SLIGHT — SIGNIFICANT CHANGE UP
MICROCYTES BLD QL: SLIGHT — SIGNIFICANT CHANGE UP
MONOCYTES # BLD AUTO: 1.29 K/UL — HIGH (ref 0–0.9)
MONOCYTES # BLD AUTO: 1.44 K/UL — HIGH (ref 0–0.9)
MONOCYTES NFR BLD AUTO: 5.2 % — SIGNIFICANT CHANGE UP (ref 2–14)
MONOCYTES NFR BLD AUTO: 5.5 % — SIGNIFICANT CHANGE UP (ref 2–14)
NEUTROPHILS # BLD AUTO: 20.45 K/UL — HIGH (ref 1.8–7.4)
NEUTROPHILS # BLD AUTO: 23.57 K/UL — HIGH (ref 1.8–7.4)
NEUTROPHILS NFR BLD AUTO: 82.6 % — HIGH (ref 43–77)
NEUTROPHILS NFR BLD AUTO: 89.9 % — HIGH (ref 43–77)
NEUTS HYPERSEG # BLD: PRESENT — SIGNIFICANT CHANGE UP
OVALOCYTES BLD QL SMEAR: SLIGHT — SIGNIFICANT CHANGE UP
PHOSPHATE SERPL-MCNC: 5.2 MG/DL — HIGH (ref 2.4–4.7)
PHOSPHATE SERPL-MCNC: 5.7 MG/DL — HIGH (ref 2.4–4.7)
PLAT MORPH BLD: NORMAL — SIGNIFICANT CHANGE UP
PLAT MORPH BLD: NORMAL — SIGNIFICANT CHANGE UP
PLATELET # BLD AUTO: 244 K/UL — SIGNIFICANT CHANGE UP (ref 150–400)
PLATELET # BLD AUTO: 257 K/UL — SIGNIFICANT CHANGE UP (ref 150–400)
POIKILOCYTOSIS BLD QL AUTO: SLIGHT — SIGNIFICANT CHANGE UP
POIKILOCYTOSIS BLD QL AUTO: SLIGHT — SIGNIFICANT CHANGE UP
POLYCHROMASIA BLD QL SMEAR: SIGNIFICANT CHANGE UP
POLYCHROMASIA BLD QL SMEAR: SLIGHT — SIGNIFICANT CHANGE UP
POTASSIUM SERPL-MCNC: 3.5 MMOL/L — SIGNIFICANT CHANGE UP (ref 3.5–5.3)
POTASSIUM SERPL-MCNC: 3.7 MMOL/L — SIGNIFICANT CHANGE UP (ref 3.5–5.3)
POTASSIUM SERPL-SCNC: 3.5 MMOL/L — SIGNIFICANT CHANGE UP (ref 3.5–5.3)
POTASSIUM SERPL-SCNC: 3.7 MMOL/L — SIGNIFICANT CHANGE UP (ref 3.5–5.3)
PROT SERPL-MCNC: 6.4 G/DL — LOW (ref 6.6–8.7)
PROTHROM AB SERPL-ACNC: 16.3 SEC — HIGH (ref 10.6–13.6)
RBC # BLD: 2.23 M/UL — LOW (ref 4.2–5.8)
RBC # BLD: 2.42 M/UL — LOW (ref 4.2–5.8)
RBC # FLD: 14.6 % — HIGH (ref 10.3–14.5)
RBC # FLD: 14.6 % — HIGH (ref 10.3–14.5)
RBC BLD AUTO: ABNORMAL
RBC BLD AUTO: ABNORMAL
SODIUM SERPL-SCNC: 134 MMOL/L — LOW (ref 135–145)
SODIUM SERPL-SCNC: 135 MMOL/L — SIGNIFICANT CHANGE UP (ref 135–145)
TOXIC GRANULES BLD QL SMEAR: PRESENT — SIGNIFICANT CHANGE UP
VARIANT LYMPHS # BLD: 0.9 % — SIGNIFICANT CHANGE UP (ref 0–6)
WBC # BLD: 24.76 K/UL — HIGH (ref 3.8–10.5)
WBC # BLD: 26.22 K/UL — HIGH (ref 3.8–10.5)
WBC # FLD AUTO: 24.76 K/UL — HIGH (ref 3.8–10.5)
WBC # FLD AUTO: 26.22 K/UL — HIGH (ref 3.8–10.5)

## 2021-10-31 PROCEDURE — 49000 EXPLORATION OF ABDOMEN: CPT | Mod: 78

## 2021-10-31 PROCEDURE — 99024 POSTOP FOLLOW-UP VISIT: CPT | Mod: GC

## 2021-10-31 PROCEDURE — 74176 CT ABD & PELVIS W/O CONTRAST: CPT | Mod: 26

## 2021-10-31 PROCEDURE — 88304 TISSUE EXAM BY PATHOLOGIST: CPT | Mod: 26

## 2021-10-31 PROCEDURE — 71045 X-RAY EXAM CHEST 1 VIEW: CPT | Mod: 26

## 2021-10-31 PROCEDURE — 74018 RADEX ABDOMEN 1 VIEW: CPT | Mod: 26

## 2021-10-31 RX ORDER — TAMSULOSIN HYDROCHLORIDE 0.4 MG/1
0.4 CAPSULE ORAL AT BEDTIME
Refills: 0 | Status: DISCONTINUED | OUTPATIENT
Start: 2021-10-31 | End: 2021-11-10

## 2021-10-31 RX ORDER — PANTOPRAZOLE SODIUM 20 MG/1
40 TABLET, DELAYED RELEASE ORAL DAILY
Refills: 0 | Status: DISCONTINUED | OUTPATIENT
Start: 2021-10-31 | End: 2021-11-07

## 2021-10-31 RX ORDER — FENTANYL CITRATE 50 UG/ML
0.5 INJECTION INTRAVENOUS
Qty: 2500 | Refills: 0 | Status: DISCONTINUED | OUTPATIENT
Start: 2021-10-31 | End: 2021-11-01

## 2021-10-31 RX ORDER — LEVETIRACETAM 250 MG/1
750 TABLET, FILM COATED ORAL EVERY 12 HOURS
Refills: 0 | Status: DISCONTINUED | OUTPATIENT
Start: 2021-10-31 | End: 2021-11-01

## 2021-10-31 RX ORDER — METOPROLOL TARTRATE 50 MG
2.5 TABLET ORAL EVERY 6 HOURS
Refills: 0 | Status: DISCONTINUED | OUTPATIENT
Start: 2021-10-31 | End: 2021-11-04

## 2021-10-31 RX ORDER — SODIUM CHLORIDE 9 MG/ML
1000 INJECTION, SOLUTION INTRAVENOUS
Refills: 0 | Status: DISCONTINUED | OUTPATIENT
Start: 2021-10-31 | End: 2021-10-31

## 2021-10-31 RX ADMIN — PANTOPRAZOLE SODIUM 40 MILLIGRAM(S): 20 TABLET, DELAYED RELEASE ORAL at 13:56

## 2021-10-31 RX ADMIN — Medication 1000 MILLIGRAM(S): at 12:50

## 2021-10-31 RX ADMIN — Medication 2.5 MILLIGRAM(S): at 17:42

## 2021-10-31 RX ADMIN — HYDROMORPHONE HYDROCHLORIDE 0.5 MILLIGRAM(S): 2 INJECTION INTRAMUSCULAR; INTRAVENOUS; SUBCUTANEOUS at 17:57

## 2021-10-31 RX ADMIN — HYDROMORPHONE HYDROCHLORIDE 0.5 MILLIGRAM(S): 2 INJECTION INTRAMUSCULAR; INTRAVENOUS; SUBCUTANEOUS at 12:50

## 2021-10-31 RX ADMIN — LEVETIRACETAM 400 MILLIGRAM(S): 250 TABLET, FILM COATED ORAL at 05:25

## 2021-10-31 RX ADMIN — LEVETIRACETAM 400 MILLIGRAM(S): 250 TABLET, FILM COATED ORAL at 17:54

## 2021-10-31 RX ADMIN — HYDROMORPHONE HYDROCHLORIDE 0.5 MILLIGRAM(S): 2 INJECTION INTRAMUSCULAR; INTRAVENOUS; SUBCUTANEOUS at 09:16

## 2021-10-31 RX ADMIN — HYDROMORPHONE HYDROCHLORIDE 0.5 MILLIGRAM(S): 2 INJECTION INTRAMUSCULAR; INTRAVENOUS; SUBCUTANEOUS at 12:39

## 2021-10-31 RX ADMIN — HYDROMORPHONE HYDROCHLORIDE 0.5 MILLIGRAM(S): 2 INJECTION INTRAMUSCULAR; INTRAVENOUS; SUBCUTANEOUS at 09:30

## 2021-10-31 RX ADMIN — Medication 15 MILLIGRAM(S): at 05:24

## 2021-10-31 RX ADMIN — SODIUM CHLORIDE 80 MILLILITER(S): 9 INJECTION, SOLUTION INTRAVENOUS at 12:39

## 2021-10-31 RX ADMIN — Medication 2.5 MILLIGRAM(S): at 05:24

## 2021-10-31 RX ADMIN — Medication 2.5 MILLIGRAM(S): at 13:56

## 2021-10-31 RX ADMIN — Medication 15 MILLIGRAM(S): at 05:39

## 2021-10-31 RX ADMIN — HYDROMORPHONE HYDROCHLORIDE 0.5 MILLIGRAM(S): 2 INJECTION INTRAMUSCULAR; INTRAVENOUS; SUBCUTANEOUS at 17:42

## 2021-10-31 RX ADMIN — HEPARIN SODIUM 0 UNIT(S)/HR: 5000 INJECTION INTRAVENOUS; SUBCUTANEOUS at 04:37

## 2021-10-31 RX ADMIN — Medication 400 MILLIGRAM(S): at 12:39

## 2021-10-31 NOTE — PROVIDER CONTACT NOTE (CRITICAL VALUE NOTIFICATION) - ACTION/TREATMENT ORDERED:
1 unit PRBC ordered already. awaiting type and screen results prior to administration.
Labs to be redrawn
Heparin puts on hold for 1 hr. It is now at 17ml/hr and will resume heparin at 14 ml/hr once resume after that hr.
Lab to be redrawn before drip adjusted
MD ordered 3 K+ riders before RN even called

## 2021-10-31 NOTE — PROGRESS NOTE ADULT - ASSESSMENT
Patient is a 60 y/o M presenting with SBO, now pod #5 status post a ex-lap with SBR.      Neuro: pain control  Cardiac: continue heparin gtt, monitor PTT, once patient is placed back on diet can transition back to eliquis  Respiratory: Pumonary toilet, incentive spirometer, encourage OOB  GI: serial abdominal exams, encourage oob, monitor for return of bowel function, sips and chips for now  : continue painting for urinary retention  Dispo: Pending ROB[facility]AN[facility]ANCO

## 2021-10-31 NOTE — BRIEF OPERATIVE NOTE - NSICDXBRIEFPREOP_GEN_ALL_CORE_FT
PRE-OP DIAGNOSIS:  Small bowel obstruction 31-Oct-2021 22:54:59  Anirudh Arango  Mesenteric hematoma 31-Oct-2021 22:55:08  Anirudh Arango  
PRE-OP DIAGNOSIS:  Small bowel obstruction 26-Oct-2021 14:38:44  Darryl Meléndez

## 2021-10-31 NOTE — BRIEF OPERATIVE NOTE - NSICDXBRIEFPOSTOP_GEN_ALL_CORE_FT
POST-OP DIAGNOSIS:  Small bowel obstruction 31-Oct-2021 22:55:19  Anirudh Arango  Mesenteric hematoma 31-Oct-2021 22:55:27  Anirudh Arango  Abdominal wall abscess 31-Oct-2021 22:55:36  Anirudh Arango

## 2021-10-31 NOTE — BRIEF OPERATIVE NOTE - NSICDXBRIEFPROCEDURE_GEN_ALL_CORE_FT
PROCEDURES:  Exploratory laparotomy with lysis of adhesions 26-Oct-2021 14:35:43  Darryl Meléndez  Evacuation, hematoma, abdomen 31-Oct-2021 22:54:10  Anirudh Arango  Wound VAC dressing change for area 50 sq cm or less 31-Oct-2021 22:54:30  Anirudh Arango  Abdominal washout 31-Oct-2021 22:54:44  Anirudh Arango  
PROCEDURES:  Diagnostic laparoscopy 26-Oct-2021 14:31:29  Darryl Meléndez  Exploratory laparotomy with lysis of adhesions 26-Oct-2021 14:35:43  Darryl Meléndez  Small bowel resection 26-Oct-2021 14:36:25  Darryl Meléndez  Enterotomy repair 26-Oct-2021 14:36:43  Darryl Meléndez  Repair of serosal tear of small intestine 26-Oct-2021 14:37:08  Darryl Meléndez

## 2021-10-31 NOTE — BRIEF OPERATIVE NOTE - OPERATION/FINDINGS
Diagnostic laparoscopic attempt however there was bowel adhered to the abdominal wall, laparotomy performed, there was a small bowel obstructed by a adhesive band, the bowel was compromised, 4 cm of the small bowel resected, 1ry anastomosis of the small bowel done. Small bowel was adhered to the abdominal wall  RENEA done, 2 serosal tears were repaired and 2 enterotomies repaired, fascia was then closed and skin stapled.
anterior abd wall abscess, cultures sent washout , mesenteric hematoma in left upper quadrant, mid small bowel and pelvis, evacuated. Lysis of dense abdominal adhesions. Copious abdominal washout, closure of fascia, placement of black foam wound vac

## 2021-10-31 NOTE — PROGRESS NOTE ADULT - SUBJECTIVE AND OBJECTIVE BOX
INTERVAL HPI/OVERNIGHT EVENTS: No acute events tonight. Denies any n/v,cp,sob.  Patient denies return of bowel function.  Patients NG tube notably had minimum output over 24 hours, this was interrogated today and began to function.  Patient notably failed his TOV today requiring a painting to be replaced overnight.       STATUS POST: ex-lab with SBR    POST OPERATIVE DAY #: 5      MEDICATIONS  (STANDING):  acetaminophen   IVPB .. 1000 milliGRAM(s) IV Intermittent once  heparin  Infusion.  Unit(s)/Hr (15 mL/Hr) IV Continuous <Continuous>  levETIRAcetam  IVPB 750 milliGRAM(s) IV Intermittent every 12 hours  metoprolol tartrate Injectable 2.5 milliGRAM(s) IV Push every 6 hours  pantoprazole  Injectable 40 milliGRAM(s) IV Push daily  sodium chloride 0.9%. 1000 milliLiter(s) (80 mL/Hr) IV Continuous <Continuous>  tamsulosin 0.4 milliGRAM(s) Oral at bedtime    MEDICATIONS  (PRN):  HYDROmorphone  Injectable 0.5 milliGRAM(s) IV Push every 4 hours PRN Severe Pain (7 - 10)  ketorolac   Injectable 15 milliGRAM(s) IV Push every 6 hours PRN Mild Pain (1 - 3)    Vital Signs Last 24 Hrs  T(C): 36.3 (30 Oct 2021 21:00), Max: 36.8 (30 Oct 2021 15:52)  T(F): 97.4 (30 Oct 2021 21:00), Max: 98.2 (30 Oct 2021 15:52)  HR: 92 (30 Oct 2021 23:18) (85 - 93)  BP: 155/70 (30 Oct 2021 23:18) (134/64 - 155/70)  BP(mean): --  ABP: --  ABP(mean): --  RR: 20 (30 Oct 2021 21:00) (18 - 20)  SpO2: 94% (30 Oct 2021 21:00) (94% - 96%)      Physical Exam:    Neurological:  No sensory/motor deficits    HEENT: PERRLA, EOMI, no drainage or redness    Neck: No bruits; no thyromegaly or nodules,  No JVD    Back: Normal spine flexure, No CVA tenderness, No deformity or limitation of movement    Respiratory: Breath Sounds equal & clear to auscultation, no accessory muscle use    Cardiovascular: Regular rate & rhythm, normal S1, S2; no murmurs, gallops or rubs    Gastrointestinal: Soft, mild diffuse tenderness, no rebound, no guarding, moderately distended.  BS absent.      Extremities: No peripheral edema, No cyanosis, clubbing     Vascular: Equal and normal pulses: 2+ peripheral pulses throughout    Musculoskeletal: No joint pain, swelling or deformity; no limitation of movement    Skin: No rashes    I&O's Detail    29 Oct 2021 07:01  -  30 Oct 2021 07:00  --------------------------------------------------------  IN:    Heparin Infusion: 195 mL    Oral Fluid: 100 mL    sodium chloride 0.9%: 240 mL  Total IN: 535 mL    OUT:    Nasogastric/Oral tube (mL): 0 mL    Other (mL): 500 mL    Voided (mL): 0 mL  Total OUT: 500 mL    Total NET: 35 mL      30 Oct 2021 07:01  -  31 Oct 2021 00:51  --------------------------------------------------------  IN:    Heparin Infusion: 296 mL    sodium chloride 0.9%: 1280 mL  Total IN: 1576 mL    OUT:    Intermittent Catheterization - Urethral (mL): 475 mL    Nasogastric/Oral tube (mL): 0 mL  Total OUT: 475 mL    Total NET: 1101 mL      .  LABS:                         7.8    19.72 )-----------( 268      ( 30 Oct 2021 10:47 )             24.2     10-30    138  |  92<L>  |  62.1<H>  ----------------------------<  94  4.1   |  22.0  |  3.30<H>    Ca    8.8      30 Oct 2021 10:47  Phos  5.9     10-30  Mg     2.5     10-30      PTT - ( 30 Oct 2021 20:24 )  PTT:29.5 sec          RADIOLOGY, EKG & ADDITIONAL TESTS: Reviewed.

## 2021-11-01 LAB
ANION GAP SERPL CALC-SCNC: 16 MMOL/L — SIGNIFICANT CHANGE UP (ref 5–17)
ANION GAP SERPL CALC-SCNC: 17 MMOL/L — SIGNIFICANT CHANGE UP (ref 5–17)
ANISOCYTOSIS BLD QL: SLIGHT — SIGNIFICANT CHANGE UP
APTT BLD: 27.6 SEC — SIGNIFICANT CHANGE UP (ref 27.5–35.5)
APTT BLD: 27.9 SEC — SIGNIFICANT CHANGE UP (ref 27.5–35.5)
BASOPHILS # BLD AUTO: 0 K/UL — SIGNIFICANT CHANGE UP (ref 0–0.2)
BASOPHILS NFR BLD AUTO: 0 % — SIGNIFICANT CHANGE UP (ref 0–2)
BUN SERPL-MCNC: 64.3 MG/DL — HIGH (ref 8–20)
BUN SERPL-MCNC: 65.7 MG/DL — HIGH (ref 8–20)
CALCIUM SERPL-MCNC: 7.9 MG/DL — LOW (ref 8.6–10.2)
CALCIUM SERPL-MCNC: 8.1 MG/DL — LOW (ref 8.6–10.2)
CHLORIDE SERPL-SCNC: 97 MMOL/L — LOW (ref 98–107)
CHLORIDE SERPL-SCNC: 99 MMOL/L — SIGNIFICANT CHANGE UP (ref 98–107)
CO2 SERPL-SCNC: 22 MMOL/L — SIGNIFICANT CHANGE UP (ref 22–29)
CO2 SERPL-SCNC: 22 MMOL/L — SIGNIFICANT CHANGE UP (ref 22–29)
CREAT SERPL-MCNC: 2.11 MG/DL — HIGH (ref 0.5–1.3)
CREAT SERPL-MCNC: 2.18 MG/DL — HIGH (ref 0.5–1.3)
EOSINOPHIL # BLD AUTO: 0.26 K/UL — SIGNIFICANT CHANGE UP (ref 0–0.5)
EOSINOPHIL NFR BLD AUTO: 0.8 % — SIGNIFICANT CHANGE UP (ref 0–6)
GAS PNL BLDA: SIGNIFICANT CHANGE UP
GLUCOSE SERPL-MCNC: 129 MG/DL — HIGH (ref 70–99)
GLUCOSE SERPL-MCNC: 142 MG/DL — HIGH (ref 70–99)
HCT VFR BLD CALC: 25.1 % — LOW (ref 39–50)
HCT VFR BLD CALC: 25.5 % — LOW (ref 39–50)
HGB BLD-MCNC: 8.2 G/DL — LOW (ref 13–17)
HGB BLD-MCNC: 8.5 G/DL — LOW (ref 13–17)
INR BLD: 1.34 RATIO — HIGH (ref 0.88–1.16)
INR BLD: 1.48 RATIO — HIGH (ref 0.88–1.16)
LYMPHOCYTES # BLD AUTO: 0.29 K/UL — LOW (ref 1–3.3)
LYMPHOCYTES # BLD AUTO: 0.9 % — LOW (ref 13–44)
MAGNESIUM SERPL-MCNC: 2.5 MG/DL — SIGNIFICANT CHANGE UP (ref 1.6–2.6)
MAGNESIUM SERPL-MCNC: 2.6 MG/DL — SIGNIFICANT CHANGE UP (ref 1.6–2.6)
MANUAL SMEAR VERIFICATION: SIGNIFICANT CHANGE UP
MCHC RBC-ENTMCNC: 28.8 PG — SIGNIFICANT CHANGE UP (ref 27–34)
MCHC RBC-ENTMCNC: 29.6 PG — SIGNIFICANT CHANGE UP (ref 27–34)
MCHC RBC-ENTMCNC: 32.7 GM/DL — SIGNIFICANT CHANGE UP (ref 32–36)
MCHC RBC-ENTMCNC: 33.3 GM/DL — SIGNIFICANT CHANGE UP (ref 32–36)
MCV RBC AUTO: 88.1 FL — SIGNIFICANT CHANGE UP (ref 80–100)
MCV RBC AUTO: 88.9 FL — SIGNIFICANT CHANGE UP (ref 80–100)
MICROCYTES BLD QL: SLIGHT — SIGNIFICANT CHANGE UP
MONOCYTES # BLD AUTO: 0.29 K/UL — SIGNIFICANT CHANGE UP (ref 0–0.9)
MONOCYTES NFR BLD AUTO: 0.9 % — LOW (ref 2–14)
NEUTROPHILS # BLD AUTO: 31.82 K/UL — HIGH (ref 1.8–7.4)
NEUTROPHILS NFR BLD AUTO: 97.4 % — HIGH (ref 43–77)
NRBC # BLD: 1 /100 — HIGH (ref 0–0)
PHOSPHATE SERPL-MCNC: 5.4 MG/DL — HIGH (ref 2.4–4.7)
PHOSPHATE SERPL-MCNC: 6.4 MG/DL — HIGH (ref 2.4–4.7)
PLAT MORPH BLD: NORMAL — SIGNIFICANT CHANGE UP
PLATELET # BLD AUTO: 302 K/UL — SIGNIFICANT CHANGE UP (ref 150–400)
PLATELET # BLD AUTO: 318 K/UL — SIGNIFICANT CHANGE UP (ref 150–400)
POIKILOCYTOSIS BLD QL AUTO: SLIGHT — SIGNIFICANT CHANGE UP
POLYCHROMASIA BLD QL SMEAR: SLIGHT — SIGNIFICANT CHANGE UP
POTASSIUM SERPL-MCNC: 4 MMOL/L — SIGNIFICANT CHANGE UP (ref 3.5–5.3)
POTASSIUM SERPL-MCNC: 4.4 MMOL/L — SIGNIFICANT CHANGE UP (ref 3.5–5.3)
POTASSIUM SERPL-SCNC: 4 MMOL/L — SIGNIFICANT CHANGE UP (ref 3.5–5.3)
POTASSIUM SERPL-SCNC: 4.4 MMOL/L — SIGNIFICANT CHANGE UP (ref 3.5–5.3)
PROTHROM AB SERPL-ACNC: 15.3 SEC — HIGH (ref 10.6–13.6)
PROTHROM AB SERPL-ACNC: 16.8 SEC — HIGH (ref 10.6–13.6)
RBC # BLD: 2.85 M/UL — LOW (ref 4.2–5.8)
RBC # BLD: 2.87 M/UL — LOW (ref 4.2–5.8)
RBC # FLD: 15.8 % — HIGH (ref 10.3–14.5)
RBC # FLD: 16.2 % — HIGH (ref 10.3–14.5)
RBC BLD AUTO: ABNORMAL
SODIUM SERPL-SCNC: 135 MMOL/L — SIGNIFICANT CHANGE UP (ref 135–145)
SODIUM SERPL-SCNC: 138 MMOL/L — SIGNIFICANT CHANGE UP (ref 135–145)
WBC # BLD: 27.69 K/UL — HIGH (ref 3.8–10.5)
WBC # BLD: 32.67 K/UL — HIGH (ref 3.8–10.5)
WBC # FLD AUTO: 27.69 K/UL — HIGH (ref 3.8–10.5)
WBC # FLD AUTO: 32.67 K/UL — HIGH (ref 3.8–10.5)

## 2021-11-01 PROCEDURE — 93306 TTE W/DOPPLER COMPLETE: CPT | Mod: 26

## 2021-11-01 PROCEDURE — 90937 HEMODIALYSIS REPEATED EVAL: CPT

## 2021-11-01 PROCEDURE — 99233 SBSQ HOSP IP/OBS HIGH 50: CPT

## 2021-11-01 PROCEDURE — 99024 POSTOP FOLLOW-UP VISIT: CPT

## 2021-11-01 PROCEDURE — 71045 X-RAY EXAM CHEST 1 VIEW: CPT | Mod: 26

## 2021-11-01 RX ORDER — SODIUM CHLORIDE 9 MG/ML
1000 INJECTION, SOLUTION INTRAVENOUS
Refills: 0 | Status: DISCONTINUED | OUTPATIENT
Start: 2021-11-01 | End: 2021-11-03

## 2021-11-01 RX ORDER — HEPARIN SODIUM 5000 [USP'U]/ML
5000 INJECTION INTRAVENOUS; SUBCUTANEOUS EVERY 8 HOURS
Refills: 0 | Status: DISCONTINUED | OUTPATIENT
Start: 2021-11-01 | End: 2021-11-03

## 2021-11-01 RX ORDER — HYDROMORPHONE HYDROCHLORIDE 2 MG/ML
0.5 INJECTION INTRAMUSCULAR; INTRAVENOUS; SUBCUTANEOUS EVERY 4 HOURS
Refills: 0 | Status: DISCONTINUED | OUTPATIENT
Start: 2021-11-01 | End: 2021-11-08

## 2021-11-01 RX ORDER — PIPERACILLIN AND TAZOBACTAM 4; .5 G/20ML; G/20ML
3.38 INJECTION, POWDER, LYOPHILIZED, FOR SOLUTION INTRAVENOUS EVERY 12 HOURS
Refills: 0 | Status: DISCONTINUED | OUTPATIENT
Start: 2021-11-01 | End: 2021-11-07

## 2021-11-01 RX ORDER — ACETAMINOPHEN 500 MG
1000 TABLET ORAL ONCE
Refills: 0 | Status: COMPLETED | OUTPATIENT
Start: 2021-11-01 | End: 2021-11-01

## 2021-11-01 RX ORDER — CHLORHEXIDINE GLUCONATE 213 G/1000ML
15 SOLUTION TOPICAL EVERY 12 HOURS
Refills: 0 | Status: DISCONTINUED | OUTPATIENT
Start: 2021-11-01 | End: 2021-11-01

## 2021-11-01 RX ORDER — ASCORBIC ACID 60 MG
1 TABLET,CHEWABLE ORAL
Qty: 0 | Refills: 0 | DISCHARGE

## 2021-11-01 RX ORDER — HYDROMORPHONE HYDROCHLORIDE 2 MG/ML
2 INJECTION INTRAMUSCULAR; INTRAVENOUS; SUBCUTANEOUS ONCE
Refills: 0 | Status: DISCONTINUED | OUTPATIENT
Start: 2021-11-01 | End: 2021-11-01

## 2021-11-01 RX ORDER — HYDROMORPHONE HYDROCHLORIDE 2 MG/ML
1 INJECTION INTRAMUSCULAR; INTRAVENOUS; SUBCUTANEOUS ONCE
Refills: 0 | Status: DISCONTINUED | OUTPATIENT
Start: 2021-11-01 | End: 2021-11-01

## 2021-11-01 RX ORDER — ACETAMINOPHEN 500 MG
1000 TABLET ORAL ONCE
Refills: 0 | Status: COMPLETED | OUTPATIENT
Start: 2021-11-02 | End: 2021-11-02

## 2021-11-01 RX ORDER — CHLORHEXIDINE GLUCONATE 213 G/1000ML
1 SOLUTION TOPICAL DAILY
Refills: 0 | Status: DISCONTINUED | OUTPATIENT
Start: 2021-11-01 | End: 2021-11-03

## 2021-11-01 RX ORDER — DIPHENHYDRAMINE HCL 50 MG
25 CAPSULE ORAL ONCE
Refills: 0 | Status: COMPLETED | OUTPATIENT
Start: 2021-11-01 | End: 2021-11-01

## 2021-11-01 RX ORDER — HYDROMORPHONE HYDROCHLORIDE 2 MG/ML
0.5 INJECTION INTRAMUSCULAR; INTRAVENOUS; SUBCUTANEOUS
Refills: 0 | Status: DISCONTINUED | OUTPATIENT
Start: 2021-11-01 | End: 2021-11-08

## 2021-11-01 RX ORDER — PIPERACILLIN AND TAZOBACTAM 4; .5 G/20ML; G/20ML
3.38 INJECTION, POWDER, LYOPHILIZED, FOR SOLUTION INTRAVENOUS ONCE
Refills: 0 | Status: COMPLETED | OUTPATIENT
Start: 2021-11-01 | End: 2021-11-01

## 2021-11-01 RX ORDER — METOPROLOL TARTRATE 50 MG
5 TABLET ORAL ONCE
Refills: 0 | Status: COMPLETED | OUTPATIENT
Start: 2021-11-01 | End: 2021-11-01

## 2021-11-01 RX ORDER — LEVETIRACETAM 250 MG/1
750 TABLET, FILM COATED ORAL
Refills: 0 | Status: DISCONTINUED | OUTPATIENT
Start: 2021-11-01 | End: 2021-11-08

## 2021-11-01 RX ORDER — HYDROMORPHONE HYDROCHLORIDE 2 MG/ML
1 INJECTION INTRAMUSCULAR; INTRAVENOUS; SUBCUTANEOUS
Refills: 0 | Status: DISCONTINUED | OUTPATIENT
Start: 2021-11-01 | End: 2021-11-08

## 2021-11-01 RX ORDER — FENTANYL CITRATE 50 UG/ML
1 INJECTION INTRAVENOUS
Qty: 2500 | Refills: 0 | Status: DISCONTINUED | OUTPATIENT
Start: 2021-11-01 | End: 2021-11-01

## 2021-11-01 RX ORDER — DIPHENHYDRAMINE HCL 50 MG
25 CAPSULE ORAL ONCE
Refills: 0 | Status: DISCONTINUED | OUTPATIENT
Start: 2021-11-01 | End: 2021-11-01

## 2021-11-01 RX ORDER — FENTANYL CITRATE 50 UG/ML
75 INJECTION INTRAVENOUS ONCE
Refills: 0 | Status: DISCONTINUED | OUTPATIENT
Start: 2021-11-01 | End: 2021-11-01

## 2021-11-01 RX ADMIN — HYDROMORPHONE HYDROCHLORIDE 1 MILLIGRAM(S): 2 INJECTION INTRAMUSCULAR; INTRAVENOUS; SUBCUTANEOUS at 02:30

## 2021-11-01 RX ADMIN — HYDROMORPHONE HYDROCHLORIDE 0.5 MILLIGRAM(S): 2 INJECTION INTRAMUSCULAR; INTRAVENOUS; SUBCUTANEOUS at 17:02

## 2021-11-01 RX ADMIN — Medication 1000 MILLIGRAM(S): at 17:02

## 2021-11-01 RX ADMIN — Medication 5 MILLIGRAM(S): at 00:23

## 2021-11-01 RX ADMIN — CHLORHEXIDINE GLUCONATE 15 MILLILITER(S): 213 SOLUTION TOPICAL at 05:17

## 2021-11-01 RX ADMIN — SODIUM CHLORIDE 75 MILLILITER(S): 9 INJECTION, SOLUTION INTRAVENOUS at 22:26

## 2021-11-01 RX ADMIN — Medication 400 MILLIGRAM(S): at 10:22

## 2021-11-01 RX ADMIN — LEVETIRACETAM 400 MILLIGRAM(S): 250 TABLET, FILM COATED ORAL at 17:33

## 2021-11-01 RX ADMIN — FENTANYL CITRATE 75 MICROGRAM(S): 50 INJECTION INTRAVENOUS at 09:55

## 2021-11-01 RX ADMIN — HYDROMORPHONE HYDROCHLORIDE 1 MILLIGRAM(S): 2 INJECTION INTRAMUSCULAR; INTRAVENOUS; SUBCUTANEOUS at 05:00

## 2021-11-01 RX ADMIN — Medication 400 MILLIGRAM(S): at 22:26

## 2021-11-01 RX ADMIN — FENTANYL CITRATE 75 MICROGRAM(S): 50 INJECTION INTRAVENOUS at 09:40

## 2021-11-01 RX ADMIN — HYDROMORPHONE HYDROCHLORIDE 0.5 MILLIGRAM(S): 2 INJECTION INTRAMUSCULAR; INTRAVENOUS; SUBCUTANEOUS at 19:40

## 2021-11-01 RX ADMIN — HYDROMORPHONE HYDROCHLORIDE 2 MILLIGRAM(S): 2 INJECTION INTRAMUSCULAR; INTRAVENOUS; SUBCUTANEOUS at 00:23

## 2021-11-01 RX ADMIN — PIPERACILLIN AND TAZOBACTAM 200 GRAM(S): 4; .5 INJECTION, POWDER, LYOPHILIZED, FOR SOLUTION INTRAVENOUS at 10:22

## 2021-11-01 RX ADMIN — LEVETIRACETAM 400 MILLIGRAM(S): 250 TABLET, FILM COATED ORAL at 05:18

## 2021-11-01 RX ADMIN — HYDROMORPHONE HYDROCHLORIDE 0.5 MILLIGRAM(S): 2 INJECTION INTRAMUSCULAR; INTRAVENOUS; SUBCUTANEOUS at 11:19

## 2021-11-01 RX ADMIN — CHLORHEXIDINE GLUCONATE 1 APPLICATION(S): 213 SOLUTION TOPICAL at 11:19

## 2021-11-01 RX ADMIN — HYDROMORPHONE HYDROCHLORIDE 0.5 MILLIGRAM(S): 2 INJECTION INTRAMUSCULAR; INTRAVENOUS; SUBCUTANEOUS at 11:34

## 2021-11-01 RX ADMIN — Medication 2.5 MILLIGRAM(S): at 17:32

## 2021-11-01 RX ADMIN — TAMSULOSIN HYDROCHLORIDE 0.4 MILLIGRAM(S): 0.4 CAPSULE ORAL at 22:26

## 2021-11-01 RX ADMIN — PANTOPRAZOLE SODIUM 40 MILLIGRAM(S): 20 TABLET, DELAYED RELEASE ORAL at 11:18

## 2021-11-01 RX ADMIN — HYDROMORPHONE HYDROCHLORIDE 1 MILLIGRAM(S): 2 INJECTION INTRAMUSCULAR; INTRAVENOUS; SUBCUTANEOUS at 04:48

## 2021-11-01 RX ADMIN — HYDROMORPHONE HYDROCHLORIDE 1 MILLIGRAM(S): 2 INJECTION INTRAMUSCULAR; INTRAVENOUS; SUBCUTANEOUS at 10:37

## 2021-11-01 RX ADMIN — HYDROMORPHONE HYDROCHLORIDE 2 MILLIGRAM(S): 2 INJECTION INTRAMUSCULAR; INTRAVENOUS; SUBCUTANEOUS at 00:40

## 2021-11-01 RX ADMIN — HEPARIN SODIUM 5000 UNIT(S): 5000 INJECTION INTRAVENOUS; SUBCUTANEOUS at 22:26

## 2021-11-01 RX ADMIN — HYDROMORPHONE HYDROCHLORIDE 0.5 MILLIGRAM(S): 2 INJECTION INTRAMUSCULAR; INTRAVENOUS; SUBCUTANEOUS at 21:22

## 2021-11-01 RX ADMIN — Medication 2.5 MILLIGRAM(S): at 11:18

## 2021-11-01 RX ADMIN — HYDROMORPHONE HYDROCHLORIDE 0.5 MILLIGRAM(S): 2 INJECTION INTRAMUSCULAR; INTRAVENOUS; SUBCUTANEOUS at 16:47

## 2021-11-01 RX ADMIN — HYDROMORPHONE HYDROCHLORIDE 1 MILLIGRAM(S): 2 INJECTION INTRAMUSCULAR; INTRAVENOUS; SUBCUTANEOUS at 15:11

## 2021-11-01 RX ADMIN — Medication 400 MILLIGRAM(S): at 16:47

## 2021-11-01 RX ADMIN — Medication 1000 MILLIGRAM(S): at 10:37

## 2021-11-01 RX ADMIN — Medication 2.5 MILLIGRAM(S): at 05:17

## 2021-11-01 RX ADMIN — HYDROMORPHONE HYDROCHLORIDE 0.5 MILLIGRAM(S): 2 INJECTION INTRAMUSCULAR; INTRAVENOUS; SUBCUTANEOUS at 09:29

## 2021-11-01 RX ADMIN — HYDROMORPHONE HYDROCHLORIDE 1 MILLIGRAM(S): 2 INJECTION INTRAMUSCULAR; INTRAVENOUS; SUBCUTANEOUS at 10:22

## 2021-11-01 RX ADMIN — HYDROMORPHONE HYDROCHLORIDE 1 MILLIGRAM(S): 2 INJECTION INTRAMUSCULAR; INTRAVENOUS; SUBCUTANEOUS at 02:17

## 2021-11-01 RX ADMIN — SODIUM CHLORIDE 75 MILLILITER(S): 9 INJECTION, SOLUTION INTRAVENOUS at 09:48

## 2021-11-01 RX ADMIN — Medication 2.5 MILLIGRAM(S): at 00:14

## 2021-11-01 RX ADMIN — HEPARIN SODIUM 5000 UNIT(S): 5000 INJECTION INTRAVENOUS; SUBCUTANEOUS at 14:56

## 2021-11-01 RX ADMIN — HYDROMORPHONE HYDROCHLORIDE 1 MILLIGRAM(S): 2 INJECTION INTRAMUSCULAR; INTRAVENOUS; SUBCUTANEOUS at 14:56

## 2021-11-01 RX ADMIN — HYDROMORPHONE HYDROCHLORIDE 0.5 MILLIGRAM(S): 2 INJECTION INTRAMUSCULAR; INTRAVENOUS; SUBCUTANEOUS at 09:14

## 2021-11-01 RX ADMIN — Medication 1000 MILLIGRAM(S): at 22:55

## 2021-11-01 RX ADMIN — Medication 25 MILLIGRAM(S): at 06:00

## 2021-11-01 RX ADMIN — FENTANYL CITRATE 8.42 MICROGRAM(S)/KG/HR: 50 INJECTION INTRAVENOUS at 00:14

## 2021-11-01 NOTE — PROGRESS NOTE ADULT - SUBJECTIVE AND OBJECTIVE BOX
[facility], [place] DIVISION OF KIDNEY DISEASES AND HYPERTENSION -- FOLLOW UP NOTE  --------------------------------------------------------------------------------  Chief Complaint:  ESRD HD  24 hour events/subjective:  HD today;      PAST HISTORY  --------------------------------------------------------------------------------  No significant changes to PMH, PSH, FHx, SHx, unless otherwise noted    ALLERGIES & MEDICATIONS  --------------------------------------------------------------------------------  Allergies    penicillin (Other)    Intolerances      Standing Inpatient Medications  chlorhexidine 2% Cloths 1 Application(s) Topical daily  heparin   Injectable 5000 Unit(s) SubCutaneous every 8 hours  levETIRAcetam  IVPB 750 milliGRAM(s) IV Intermittent every 12 hours  metoprolol tartrate Injectable 2.5 milliGRAM(s) IV Push every 6 hours  multiple electrolytes Injection Type 1 1000 milliLiter(s) IV Continuous <Continuous>  pantoprazole  Injectable 40 milliGRAM(s) IV Push daily  piperacillin/tazobactam IVPB.. 3.375 Gram(s) IV Intermittent every 12 hours  tamsulosin 0.4 milliGRAM(s) Oral at bedtime    PRN Inpatient Medications  HYDROmorphone  Injectable 0.5 milliGRAM(s) IV Push every 3 hours PRN  HYDROmorphone  Injectable 0.5 milliGRAM(s) IV Push every 4 hours PRN  HYDROmorphone  Injectable 1 milliGRAM(s) IV Push every 3 hours PRN      REVIEW OF SYSTEMS  --------------------------------------------------------------------------------  Gen: No weight changes, fatigue, fevers/chills, weakness  Skin: No rashes  Head/Eyes/Ears/Mouth: No headache; Normal hearing; Normal vision w/o blurriness; No sinus pain/discomfort, sore throat  Respiratory: No dyspnea, cough, wheezing, hemoptysis  CV: No chest pain, PND, orthopnea  GI: No abdominal pain, diarrhea, constipation, nausea, vomiting, melena, hematochezia  : No increased frequency, dysuria, hematuria, nocturia  MSK: No joint pain/swelling; no back pain; no edema  Neuro: No dizziness/lightheadedness, weakness, seizures, numbness, tingling  Heme: No easy bruising or bleeding  Endo: No heat/cold intolerance  Psych: No significant nervousness, anxiety, stress, depression    All other systems were reviewed and are negative, except as noted.    VITALS/PHYSICAL EXAM  --------------------------------------------------------------------------------  T(C): 37.7 (11-01-21 @ 07:21), Max: 38 (10-31-21 @ 23:14)  HR: 86 (11-01-21 @ 11:00) (81 - 103)  BP: 116/99 (11-01-21 @ 07:41) (116/99 - 156/65)  RR: 18 (11-01-21 @ 11:00) (8 - 18)  SpO2: 96% (11-01-21 @ 11:00) (92% - 100%)  Wt(kg): --  Height (cm): 172.7 (11-01-21 @ 00:00)  Weight (kg): 84.6 (11-01-21 @ 00:00)  BMI (kg/m2): 28.4 (11-01-21 @ 00:00)  BSA (m2): 1.98 (11-01-21 @ 00:00)      10-31-21 @ 07:01  -  11-01-21 @ 07:00  --------------------------------------------------------  IN: 651.6 mL / OUT: 3090 mL / NET: -2438.4 mL    11-01-21 @ 07:01  -  11-01-21 @ 11:31  --------------------------------------------------------  IN: 387.5 mL / OUT: 270 mL / NET: 117.5 mL      Physical Exam:  	Gen: NAD   	HEENT:NGT  	Pulm: CTA B/L  	CV: RRR, S1S2; no rub  	Back: No spinal or CVA tenderness; no sacral edema  	Abd: +BS, soft, nontender/nondistended  	: No suprapubic tenderness  	UE: Warm, FROM, no clubbing, intact strength; no edema; no asterixis  	LE: Warm, FROM, no clubbing, intact strength; no edema  	Neuro: No focal deficits, intact gait  	Psych: Normal affect and mood  	Skin: Warm, without rashes       LABS/STUDIES  --------------------------------------------------------------------------------              8.5    32.67 >-----------<  318      [11-01-21 @ 04:35]              25.5     138  |  99  |  65.7  ----------------------------<  142      [11-01-21 @ 04:35]  4.4   |  22.0  |  2.18        Ca     8.1     [11-01-21 @ 04:35]      Mg     2.6     [11-01-21 @ 04:35]      Phos  6.4     [11-01-21 @ 04:35]    TPro  6.4  /  Alb  3.0  /  TBili  2.2  /  DBili  x   /  AST  20  /  ALT  18  /  AlkPhos  123  [10-31-21 @ 03:57]    PT/INR: PT 15.3 , INR 1.34       [11-01-21 @ 04:35]  PTT: 27.6       [11-01-21 @ 04:35]      Creatinine Trend:  SCr 2.18 [11-01 @ 04:35]  SCr 2.11 [11-01 @ 00:19]  SCr 2.49 [10-31 @ 15:24]  SCr 2.74 [10-31 @ 03:57]  SCr 3.30 [10-30 @ 10:47]        Iron 29, TIBC 227, %sat 13      [12-05-20 @ 08:05]  Ferritin 1099      [12-05-20 @ 08:05]  TSH 2.16      [08-12-21 @ 12:05]  Lipid: chol --, , HDL --, LDL --      [11-15-20 @ 02:40]    HBsAb 322.1      [10-26-21 @ 01:09]  HBsAg Nonreact      [10-26-21 @ 01:09]  HCV 0.15, Nonreact      [10-26-21 @ 01:09]

## 2021-11-01 NOTE — PROGRESS NOTE ADULT - SUBJECTIVE AND OBJECTIVE BOX
Acute Care Surgery/Trauma Surgery Progress Note:    No acute overnight events. Patient afebrile, and hemdinamically competent with no pressor at the moment.  Pain well controlled. Tolerating diet. Denies n/v/f/c/cp/sob.     Diet, NPO:   Except Medications     Special Instructions for Nursing:  Except Medications (10-31-21 @ 23:28)      Scheduled Medications:   chlorhexidine 0.12% Liquid 15 milliLiter(s) Oral Mucosa every 12 hours  fentaNYL   Infusion. 1 MICROgram(s)/kG/Hr (8.42 mL/Hr) IV Continuous <Continuous>  HYDROmorphone  Injectable 1 milliGRAM(s) IV Push once  levETIRAcetam  IVPB 750 milliGRAM(s) IV Intermittent every 12 hours  metoprolol tartrate Injectable 2.5 milliGRAM(s) IV Push every 6 hours  pantoprazole  Injectable 40 milliGRAM(s) IV Push daily  tamsulosin 0.4 milliGRAM(s) Oral at bedtime    PRN Medications:      Objective:   T(F): 100.2 (11-01 @ 00:00), Max: 100.4 (10-31 @ 23:14)  HR: 82 (11-01 @ 03:30) (81 - 103)  BP: 152/62 (10-31 @ 23:30) (122/64 - 156/65)  BP(mean): 91 (10-31 @ 23:30) (91 - 93)  ABP: 127/53 (11-01 @ 03:00) (127/53 - 151/62)  ABP(mean): 78 (11-01 @ 03:00) (78 - 92)  RR: 15 (11-01 @ 03:00) (15 - 19)  SpO2: 99% (11-01 @ 03:30) (94% - 100%)  Mode: AC/ CMV (Assist Control/ Continuous Mandatory Ventilation), RR (machine): 15, TV (machine): 550, FiO2: 50, PEEP: 5, MAP: 11, PIP: 21    Physical Exam:   GEN: patient resting comfortably in bed, in no acute distress  RESP: respirations are unlabored, no accessory muscle use, no conversational dyspnea  CVS: RRR  GI: Abdomen soft, non-tender, non-distended, no rebound tenderness / guarding.  Wound vac working properly.     I&O's    10-30 @ 07:01  -  10-31 @ 07:00  --------------------------------------------------------  IN:    Heparin Infusion: 405 mL    sodium chloride 0.9%: 1760 mL  Total IN: 2165 mL    OUT:    Indwelling Catheter - Urethral (mL): 550 mL    Intermittent Catheterization - Urethral (mL): 475 mL    Nasogastric/Oral tube (mL): 600 mL  Total OUT: 1625 mL    Total NET: 540 mL      10-31 @ 07:01 - 11-01 @ 04:08  --------------------------------------------------------  IN:    dextrose 5% + sodium chloride 0.45%: 80 mL    FentaNYL: 25.8 mL    IV PiggyBack: 100 mL    sodium chloride 0.9%: 320 mL  Total IN: 525.8 mL    OUT:    Indwelling Catheter - Urethral (mL): 800 mL    Nasogastric/Oral tube (mL): 1475 mL    Voided (mL): 500 mL  Total OUT: 2775 mL    Total NET: -2249.2 mL          LABS:                        8.2    27.69 )-----------( 302      ( 01 Nov 2021 00:19 )             25.1     11-01    135  |  97<L>  |  64.3<H>  ----------------------------<  129<H>  4.0   |  22.0  |  2.11<H>    Ca    7.9<L>      01 Nov 2021 00:19  Phos  5.4     11-01  Mg     2.5     11-01    TPro  6.4<L>  /  Alb  3.0<L>  /  TBili  2.2<H>  /  DBili  x   /  AST  20  /  ALT  18  /  AlkPhos  123<H>  10-31    PT/INR - ( 01 Nov 2021 00:19 )   PT: 16.8 sec;   INR: 1.48 ratio         PTT - ( 01 Nov 2021 00:19 )  PTT:27.9 sec      MICROBIOLOGY:       PATHOLOGY:  Surgical Pathology Report:   ACCESSION No:  [id number]      Accession:                             [id number]    Collected Date/Time:                   10/26/2021 17:00 EDT  Received Date/Time:                    10/27/2021 09:10 EDT    Surgical Pathology Report - Auth (Verified)    Specimen(s) Submitted  1  Small bowel segment    Final Diagnosis    Small intestine, NOS, segmental resection ( length = 3.5  cm)  - Dense serosal adhesions, and chronic serositis / peritonitis  - Negative for chronic changes of inflammatory bowel disease,  dysplasia,    or malignancy  - Anastomotic margins: Mucosa appears viable. Mild chronic serositis  present  Verified by: Av Pollack M.D.  (Electronic Signature)  Reported on: 10/30/21 10:56 EDT, [address] [place] [address]  Phone: [phone]   Fax: [phone]  _________________________________________________________________      Comment    Medical record / operative findings reviewed    Clinical Information  Small bowel obstruction    Gross Description  Received:  In formalin labeled  "small bowel segment" , consisting of an  unoriented segment of bowel with two stapled ends  Dimensions:  3.5 cm in length, and from 1.9 to 4.0 cm cm in diameter.  Additional Comments:   The serosa is pink-tan and focally hemorrhagic and  smooth with scant attached focally hemorrhagic adipose tissue. There is  an area of mild stricture, 0.5 cm from the smaller stapled margin. On  opening, there is a 100% circumferential area of focal hemorrhage at the  mucosa measuring 1.9 x 0.4 x 0.1 cm corresponding to the stricture. No  definitive lesions are grossly identified. The remaining mucosa is pink  red folded and unremarkable. The bowel measures from 0.1 to 0.5 cm in  thickness. The fat is unremarkable.  Submitted:  Representative tissue in 5 cassettes:  1A: Smaller stapled margin shaved  1B-1C: Larger stapled margin shaved  1D-1E: Representative area of stricture with adjacent normal mucosa    Maryjane Kwongzach 10/27/2021 09:41 AM      Disclaimer  In addition to other data that may appear on the specimen containers, all  labels have been inspected to confirm the presence of the patient's name  and date of birth.    Histological Processing Performed at [facility],  Department of Pathology, [address] [place] [address]. (10-26-21 @ 17:00)    Assesment:    Mr. Yoo underwent  on 10/31/21 exploratory laparotomy. Evacuation of three mesenteric  no obvious bleed. Also, a evacuation of abdominal wall abscess. Ebl 100; 2PRBC were transfused, 1 FFP, 1 PLT and 700 IVF perfused. Urine output 375 cc. He is on no pressors, and under ventilatory support at 15 rrp; 550 vol insp, 5peep and 50%. Probably will be extubated 11/1 /21 early morning. It is indicated broad spectrum antibiotics; NPO, IVF maintenance HD due to ERSD MWF vie ANDRÉSE AVF.    PLAN  Extubation at eraly morning 11/1  FU wound cultures.  DVT prophylaxis starting 11/1/21.  Hold full Anticoagulation for a. fib.  Monitor functionality of wound vac.; abd. fascia closed. TAKE OF WOUND VAC ON FRIDAY 11/5/2021.  On 11/5/21 inject lidocain with epinephrine on the wound and staple skin.

## 2021-11-01 NOTE — PHARMACOTHERAPY INTERVENTION NOTE - COMMENTS
Spoke with patient at bedside and called pharmacy for home medication list. Patient reports taking keppra 750mg only once daily and was taken off his amiodarone by his outpatient provider

## 2021-11-01 NOTE — PROGRESS NOTE ADULT - SUBJECTIVE AND OBJECTIVE BOX
ICU Vital Signs Last 24 Hrs,    T(C): 37.7 (2021 07:21), Max: 38 (31 Oct 2021 23:14)  T(F): 99.9 (2021 07:21), Max: 100.4 (31 Oct 2021 23:14)  HR: 89 (2021 09:00) (81 - 103)  BP: 116/99 (2021 07:41) (116/99 - 156/65)  BP(mean): 106 (2021 07:41) (91 - 106)  ABP: 133/51 (2021 09:00) (121/48 - 170/64)  ABP(mean): 81 (2021 09:00) (75 - 101)  RR: 15 (2021 09:00) (8 - 17)  SpO2: 95% (2021 09:00) (92% - 100%)    138    |  99     |  65.7<H>  ----------------------------<  142<H>  Ca:8.1<L> (2021 04:35)  4.4     |  22.0   |  2.18<H>    eGFR if Non : 32 *L*  eGFR if : 37 *L*    TPro  6.4<L>  /  Alb  3.0<L>  /  TBili  2.2<H>  /  DBili  x      /  AST  20     /  ALT  18     /  AlkPhos  123<H>  31 Oct 2021 03:57                              8.5<L>  32.67<H> )-----------( 318      ( 2021 04:35 )                  25.5<L>    Phos:6.4 mg/dL<H> M.6 mg/dL   ( @ 04:35)    Refused HD today,

## 2021-11-01 NOTE — CHART NOTE - NSCHARTNOTEFT_GEN_A_CORE
Source: Patient [ ]  Family [ ]   other [ x] EMR and discussed in A.M. rounds     Current Diet: Diet, NPO:   Except Medications     Special Instructions for Nursing:  Except Medications (10-31-21 @ 23:28)    Current Weight:   11/1: 84.6 kg   10/27: 81.9 kg   10/22: 83.9 kg     % Weight Change: Unsure of accuracy of weights secondary to inconsistency; will continue to monitor weights for trends. (2+ edema to R arm)     Pertinent Medications: MEDICATIONS  (STANDING):  chlorhexidine 2% Cloths 1 Application(s) Topical daily  heparin   Injectable 5000 Unit(s) SubCutaneous every 8 hours  levETIRAcetam  IVPB 750 milliGRAM(s) IV Intermittent every 12 hours  metoprolol tartrate Injectable 2.5 milliGRAM(s) IV Push every 6 hours  multiple electrolytes Injection Type 1 1000 milliLiter(s) (75 mL/Hr) IV Continuous <Continuous>  pantoprazole  Injectable 40 milliGRAM(s) IV Push daily  piperacillin/tazobactam IVPB.. 3.375 Gram(s) IV Intermittent every 12 hours  tamsulosin 0.4 milliGRAM(s) Oral at bedtime    MEDICATIONS  (PRN):  HYDROmorphone  Injectable 0.5 milliGRAM(s) IV Push every 3 hours PRN Moderate Pain (4 - 6)  HYDROmorphone  Injectable 0.5 milliGRAM(s) IV Push every 4 hours PRN break through pain  HYDROmorphone  Injectable 1 milliGRAM(s) IV Push every 3 hours PRN Severe Pain (7 - 10)    Pertinent Labs: CBC Full  -  ( 01 Nov 2021 04:35 )  WBC Count : 32.67 K/uL  RBC Count : 2.87 M/uL  Hemoglobin : 8.5 g/dL  Hematocrit : 25.5 %  Platelet Count - Automated : 318 K/uL  Mean Cell Volume : 88.9 fl  Mean Cell Hemoglobin : 29.6 pg  Mean Cell Hemoglobin Concentration : 33.3 gm/dL  Auto Neutrophil # : 31.82 K/uL  Auto Lymphocyte # : 0.29 K/uL  Auto Monocyte # : 0.29 K/uL  Auto Eosinophil # : 0.26 K/uL  Auto Basophil # : 0.00 K/uL  Auto Neutrophil % : 97.4 %  Auto Lymphocyte % : 0.9 %  Auto Monocyte % : 0.9 %  Auto Eosinophil % : 0.8 %  Auto Basophil % : 0.0 %        Skin: Surgical site midline abd     Nutrition focused physical exam- previously conducted - found signs of malnutrition [ ]absent [ ]present    Subcutaneous fat loss: mod  [x ] Orbital fat pads region, [x ]Buccal fat region, [ ]Triceps region,  [ ]Ribs region    Muscle wasting: mod  [x ]Temples region, [x ]Clavicle region, [x ]Shoulder region, [ ]Scapula region, [ ]Interosseous region,  [ ]thigh region, [ ]Calf region    Estimated Needs:   [x ] no change since previous assessment  [ ] recalculated:     Hospital Course:   Pt is a 61y male with extensive medical history and known to SICU who was initially admitted for SBO on 10/23 who underwent ex lap with 4cm SBR on 10/26 for necrotic bowel. He tolerated well the procedure and was downgraded to the floor. 3 days ago his H/H started to downtrend and he got a CT abdomen that demonstrated significant mesenteric collections consistent with mesenteric hematomas. In the setting of and H/H of 6.6, he was taken to the OR last night for exploratory laparotomy and evacuation of mesenteric hematoma. Hemostasis was obtained intraop and he required 2U PRBC, 1FFP, and 1Plt. Came to the SICU because he was unable to be extubated. Arrived on Propofol and hemodynamically stable. Changed to Fentanyl which he tolerated all night. At 4Am patient was awake. ABG and chest xray were unremarkable and he was successfully extubated. Currently on venti mask with no active complaints.       Current Nutrition Diagnosis:  Pt remains at high nutrition risk secondary to severe (acute) protein calorie malnutrition related to inability to meet sufficient protein energy  requirements with altered GI function in setting of SBO, s/p ex lap with 4cm SBR on 10/26 for necrotic bowel, s/p exploratory laparotomy and evacuation of mesenteric hematoma as evidenced by physical signs of moderate muscle mass and fat loss, meeting less then 50% estimated needs > 5 days. Pt is now extubated; remains NPO at this time. Recommendations below:     Recommendations:   1. RX: MVI and Vit. C daily (500mg) via tolerated route  2. Check weight daily to monitor trends   3. Monitor H/H, Renal labs   4. PO diet per surgical team     Monitoring and Evaluation:   [ ] PO intake [x ] Tolerance to diet prescription [X] Weights  [X] Follow up per protocol [X] Labs: Source: Patient [ ]  Family [ ]   other [ x] EMR and discussed in A.M. rounds     Current Diet: Diet, NPO:   Except Medications     Special Instructions for Nursing:  Except Medications (10-31-21 @ 23:28)    Current Weight:   11/1: 84.6 kg   10/27: 81.9 kg   10/22: 83.9 kg     % Weight Change: Unsure of accuracy of weights secondary to inconsistency; will continue to monitor weights for trends. (2+ edema to R arm)     Pertinent Medications: MEDICATIONS  (STANDING):  chlorhexidine 2% Cloths 1 Application(s) Topical daily  heparin   Injectable 5000 Unit(s) SubCutaneous every 8 hours  levETIRAcetam  IVPB 750 milliGRAM(s) IV Intermittent every 12 hours  metoprolol tartrate Injectable 2.5 milliGRAM(s) IV Push every 6 hours  multiple electrolytes Injection Type 1 1000 milliLiter(s) (75 mL/Hr) IV Continuous <Continuous>  pantoprazole  Injectable 40 milliGRAM(s) IV Push daily  piperacillin/tazobactam IVPB.. 3.375 Gram(s) IV Intermittent every 12 hours  tamsulosin 0.4 milliGRAM(s) Oral at bedtime    MEDICATIONS  (PRN):  HYDROmorphone  Injectable 0.5 milliGRAM(s) IV Push every 3 hours PRN Moderate Pain (4 - 6)  HYDROmorphone  Injectable 0.5 milliGRAM(s) IV Push every 4 hours PRN break through pain  HYDROmorphone  Injectable 1 milliGRAM(s) IV Push every 3 hours PRN Severe Pain (7 - 10)    Pertinent Labs: CBC Full  -  ( 01 Nov 2021 04:35 )  WBC Count : 32.67 K/uL  RBC Count : 2.87 M/uL  Hemoglobin : 8.5 g/dL  Hematocrit : 25.5 %  Platelet Count - Automated : 318 K/uL  Mean Cell Volume : 88.9 fl  Mean Cell Hemoglobin : 29.6 pg  Mean Cell Hemoglobin Concentration : 33.3 gm/dL  Auto Neutrophil # : 31.82 K/uL  Auto Lymphocyte # : 0.29 K/uL  Auto Monocyte # : 0.29 K/uL  Auto Eosinophil # : 0.26 K/uL  Auto Basophil # : 0.00 K/uL  Auto Neutrophil % : 97.4 %  Auto Lymphocyte % : 0.9 %  Auto Monocyte % : 0.9 %  Auto Eosinophil % : 0.8 %  Auto Basophil % : 0.0 %        Skin: Surgical site midline abd     Nutrition focused physical exam- previously conducted - found signs of malnutrition [ ]absent [ ]present    Subcutaneous fat loss: mod  [x ] Orbital fat pads region, [x ]Buccal fat region, [ ]Triceps region,  [ ]Ribs region    Muscle wasting: mod  [x ]Temples region, [x ]Clavicle region, [x ]Shoulder region, [ ]Scapula region, [ ]Interosseous region,  [ ]thigh region, [ ]Calf region    Estimated Needs:   [x ] no change since previous assessment  [ ] recalculated:     Hospital Course:   Pt is a 61y male with extensive medical history and known to SICU who was initially admitted for SBO on 10/23 who underwent ex lap with 4cm SBR on 10/26 for necrotic bowel. He tolerated well the procedure and was downgraded to the floor. 3 days ago his H/H started to downtrend and he got a CT abdomen that demonstrated significant mesenteric collections consistent with mesenteric hematomas. In the setting of and H/H of 6.6, he was taken to the OR last night for exploratory laparotomy and evacuation of mesenteric hematoma. Hemostasis was obtained intraop and he required 2U PRBC, 1FFP, and 1Plt. Came to the SICU because he was unable to be extubated. Arrived on Propofol and hemodynamically stable. Changed to Fentanyl which he tolerated all night. At 4Am patient was awake. ABG and chest xray were unremarkable and he was successfully extubated. Currently on venti mask with no active complaints.       Current Nutrition Diagnosis:  Pt remains at high nutrition risk secondary to severe (acute) protein calorie malnutrition related to inability to meet sufficient protein energy  requirements with altered GI function in setting of SBO, s/p ex lap with 4cm SBR on 10/26 for necrotic bowel, s/p exploratory laparotomy and evacuation of mesenteric hematoma as evidenced by physical signs of moderate muscle mass and fat loss, meeting less then 50% estimated needs > 5 days. Pt is now extubated; remains NPO at this time. Recommendations below:     Recommendations:   1. RX: Nephro-jeanie, folic acid and Vit. C daily (500mg) via tolerated route  2. Check weight daily to monitor trends   3. Monitor H/H, Renal labs   4. PO diet per surgical team     Monitoring and Evaluation:   [ ] PO intake [x ] Tolerance to diet prescription [X] Weights  [X] Follow up per protocol [X] Labs:

## 2021-11-01 NOTE — AIRWAY REMOVAL NOTE  ADULT & PEDS - ARTIFICAL AIRWAY REMOVAL COMMENTS
Written order for extubation verified. The patient was identified by full name and birth date compared to the identification band. Present during the procedure was LOVE Maria RN. RT Afshan Tsai

## 2021-11-01 NOTE — CONSULT NOTE ADULT - SUBJECTIVE AND OBJECTIVE BOX
SICU Consultation Note    HPI: 61y male with extensive medical history and known to SICU who was initially admitted for SBO on 10/23 who underwent ex lap with 4cm SBR on 10/26 for necrotic bowel. He tolerated well the procedure and was downgraded to the floor. 3 days ago his H/H started to downtrend and he got a CT abdomen that demonstrated significant mesenteric collections consistent with mesenteric hematomas. In the setting of and H/H of 6.6, he was taken to the OR last night for exploratory laparotomy and evacuation of mesenteric hematoma. Hemostasis was obtained intraop and he required 2U PRBC, 1FFP, and 1Plt. Came to the SICU because he was unable to be extubated. Arrived on Propofol and hemodynamically stable. Changed to Fentanyl which he tolerated all night. At 4Am patient was awake. ABG and chest xray were unremarkable and he was successfully extubated. Currently on venti mask with no active complaints.     Surgery Information    EBL: 100      UOP: 375             IV Fluids:  700     Blood Products:  2PRBC, 1FFP, 1Plt.           PAST MEDICAL & SURGICAL HISTORY:  CHF (congestive heart failure)  CVA (cerebral vascular accident)  Seizures  last seizure 10 years ago  HTN (hypertension)  Hyperlipemia  COVID-19 october 2020  Atrial fibrillation  ESRD on dialysis  Tu/Thurs/Sat  Former smoker  History of cocaine use  remote hx, currently sober  H/O aortic dissection  s/p repair (2010), surgery complicated by bowel ischemia s/p bowel resection and ostomy with reversal  H/O aortic valve insufficiency  Mitral regurgitation  GIB (gastrointestinal bleeding)  CAD (coronary artery disease)  s/p PCI 1998  and CABG x 2 11/2020  H/O colectomy  Status post double vessel coronary artery bypass  11/2020 Dr Butler  S/P AVR (aortic valve replacement)  (t)AVR 11/2020 Dr Butler  S/P MVR (mitral valve replacement)  (t)MVR 11/2020 Dr Butler  H/O aortic dissection  Type A; emergent repair 2010  AV fistula  LUE    Home Meds:   Allergies: penicillin (Other)    Soc:   Advanced Directives: Presumed Full Code     ROS:    General: Non-Contributory  Skin/Breast: Non-Contributory  Ophthalmologic: Non-Contributory  ENMT: Non-Contributory  Respiratory and Thorax: Non-Contributory  Cardiovascular: Non-Contributory  Gastrointestinal: Non-Contributory  Genitourinary: Non-Contributory  Musculoskeletal: Non-Contributory  Neurological: Non-Contributory  Psychiatric: Non-Contributory  Hematology/Lymphatics: Non-Contributory  Endocrine: Non-Contributory  Allergic/Immunologic: Non-Contributory    CURRENT MEDICATIONS:   --------------------------------------------------------------------------------------  Neurologic Medications  fentaNYL   Infusion. 1 MICROgram(s)/kG/Hr IV Continuous <Continuous>  levETIRAcetam  IVPB 750 milliGRAM(s) IV Intermittent every 12 hours    Respiratory Medications  diphenhydrAMINE Injectable 25 milliGRAM(s) IV Push once    Cardiovascular Medications  metoprolol tartrate Injectable 2.5 milliGRAM(s) IV Push every 6 hours  tamsulosin 0.4 milliGRAM(s) Oral at bedtime    Gastrointestinal Medications  pantoprazole  Injectable 40 milliGRAM(s) IV Push daily    Genitourinary Medications    Hematologic/Oncologic Medications    Antimicrobial/Immunologic Medications    Endocrine/Metabolic Medications    Topical/Other Medications  chlorhexidine 0.12% Liquid 15 milliLiter(s) Oral Mucosa every 12 hours        EXAM:  General/Neuro  RASS: 0  GCS: 14  Exam: Normal, NAD, alert, oriented x 3, no focal deficits. PERRLA     Respiratory  Exam: Lungs clear to auscultation, Normal expansion/effort.     Cardiovascular  Exam: S1, S2.  Regular rate and rhythm.   Cardiac Rhythm: Normal Sinus Rhythm      GI  Exam: Abdomen soft, Non-tender, Non-distended.    Wound: wound vac at midline incision.   Current Diet:  NPO      Tubes/Lines/Drains    [x] Peripheral IV        [x] Urinary Catheter		    Extremities  Exam: Extremities warm, pink, well-perfused.        Derm:  Exam: Good skin turgor, no skin breakdown.      :   Exam: Chapman catheter in place.   --------------------------------------------------------------------------------------

## 2021-11-01 NOTE — PROGRESS NOTE ADULT - ASSESSMENT
1) ESRD on HD  2) MBD of renal dx  3) Anemia of renal dx  4) Vol HTN    HD today  Will start epogen with HD; below goal HGB;  Trend phosphorus;

## 2021-11-01 NOTE — CONSULT NOTE ADULT - ATTENDING COMMENTS
I have seen and examined the patient during SICU rounds from 830-11a  Event noted (extubated)    Neuro: Awake, non focal  CV: HD normal, perfusion is adequate  Pulm: extubated this am, adequate SaO2 and ETCO2  GI: NGT fecaloid output, no flatus, abd pain at incision, no flatus  : urine flow is adequate, electrolytes improving cr, HD per renal  ID: leukemoid reaction, cultures pending. afebrile  Endo: glycemia at target    PLAN:  Taken back to OR, drained mesenteric hematomas: leukemoid reaction and hematoma in the setting of previous RENEA SBR and enterotomies (high risk of hematomas to be infected) , will add zosyn for 4 days and follow peritoneal cultures.  NGT  OOB  HD per renal  DVT chemoprophylaxes I have seen and examined the patient during SICU rounds from 830-11a  Event noted (extubated)    Neuro: Awake, non focal  CV: HD normal, perfusion is adequate  Pulm: extubated this am, adequate SaO2 and ETCO2  GI: NGT fecaloid output, no flatus, abd pain at incision, no flatus  : urine flow is adequate, electrolytes improving cr, HD per renal  ID: leukemoid reaction, cultures pending. afebrile  Endo: glycemia at target    PLAN:  Taken back to OR, drained mesenteric hematomas: leukemoid reaction and hematoma in the setting of previous RENEA SBR and enterotomies (high risk of hematomas to be infected) , will add zosyn for 4 days and follow peritoneal cultures.  NGT  OOB  Hold systemic anticoagulation at the time: need cardiology to risk asses for Abif and valve replacement.  HD per renal  DVT chemoprophylaxes negative...

## 2021-11-01 NOTE — PROGRESS NOTE ADULT - ASSESSMENT
1) ESRD on HD ( Access : L - Forearm AVG )   2) SBO s/p small bowel resection  3) Anemia of CKD    HD MWF schedule  Hgb.,  below goal- On  REINALDO, PRBC Transfusion  as indicated    HD This AM, NO UF, NO Heparin,

## 2021-11-01 NOTE — PROGRESS NOTE ADULT - SUBJECTIVE AND OBJECTIVE BOX
HPI: 61y male with extensive medical history and known to SICU who was initially admitted for SBO on 10/23 who underwent ex lap with 4cm SBR on 10/26 for necrotic bowel. He tolerated well the procedure and was downgraded to the floor. 3 days ago his H/H started to downtrend and he got a CT abdomen that demonstrated significant mesenteric collections consistent with mesenteric hematomas. In the setting of and H/H of 6.6, he was taken to the OR last night for exploratory laparotomy and evacuation of mesenteric hematoma. Hemostasis was obtained intraop and he required 2U PRBC, 1FFP, and 1Plt. Came to the SICU because he was unable to be extubated. Arrived on Propofol and hemodynamically stable. Changed to Fentanyl which he tolerated all night. At 4Am patient was awake. ABG and chest xray were unremarkable and he was successfully extubated.     Surgery Information    EBL: 100      UOP: 375             IV Fluids:  700     Blood Products:  2PRBC, 1FFP, 1Plt.         PAST MEDICAL & SURGICAL HISTORY:    CHF (congestive heart failure)  CVA (cerebral vascular accident)  Seizures ( Last seizure 10 years ago)  HTN (hypertension)  Hyperlipemia  COVID-19 october 2020  Atrial fibrillation  ESRD on dialysis    Former smoker  History of cocaine use  H/O aortic dissection  s/p repair (2010), surgery complicated by bowel ischemia s/p bowel resection and ostomy with reversal  H/O aortic valve insufficiency  Mitral regurgitation  GIB (gastrointestinal bleeding)  CAD (coronary artery disease)  s/p PCI 1998  and CABG x 2 11/2020  H/O colectomy  Status post double vessel coronary artery bypass  11/2020 Dr Butler  S/P AVR (aortic valve replacement)  (t)AVR 11/2020 Dr Butler  S/P MVR (mitral valve replacement)  (t)MVR 11/2020 Dr Butler  H/O aortic dissection  Type A; emergent repair 2010  AVG ( L - Forearm )     Home Meds:   Allergies: penicillin (Other)    Soc:   Advanced Directives: Presumed Full Code     ROS:    General: Non-Contributory  Skin/Breast: Non-Contributory  Ophthalmologic: Non-Contributory  ENMT: Non-Contributory  Respiratory and Thorax: Non-Contributory  Cardiovascular: Non-Contributory  Gastrointestinal: Non-Contributory  Genitourinary: Non-Contributory  Musculoskeletal: Non-Contributory  Neurological: Non-Contributory  Psychiatric: Non-Contributory  Hematology/Lymphatics: Non-Contributory  Endocrine: Non-Contributory  Allergic/Immunologic: Non-Contributory    CURRENT MEDICATIONS:   --------------------------------------------------------------------------------------  Neurologic Medications  fentaNYL   Infusion. 1 MICROgram(s)/kG/Hr IV Continuous <Continuous>  levETIRAcetam  IVPB 750 milliGRAM(s) IV Intermittent every 12 hours    Respiratory Medications  diphenhydrAMINE Injectable 25 milliGRAM(s) IV Push once    Cardiovascular Medications  metoprolol tartrate Injectable 2.5 milliGRAM(s) IV Push every 6 hours  tamsulosin 0.4 milliGRAM(s) Oral at bedtime    Gastrointestinal Medications  pantoprazole  Injectable 40 milliGRAM(s) IV Push daily    Genitourinary Medications    Hematologic/Oncologic Medications    Antimicrobial/Immunologic Medications    Endocrine/Metabolic Medications    Topical/Other Medications  chlorhexidine 0.12% Liquid 15 milliLiter(s) Oral Mucosa every 12 hours    EXAM:  General/Neuro  RASS: 0  GCS: 14  Exam: Normal, NAD, alert, oriented x 3, no focal deficits. PERRLA     Respiratory  Exam: Lungs clear to auscultation, Normal expansion/effort.     Cardiovascular  Exam: S1, S2.  Regular rate and rhythm.   Cardiac Rhythm: Normal Sinus Rhythm    GI  Exam: Abdomen soft, Non-tender, Non-distended.    Wound: wound vac at midline incision.   Current Diet:  NPO    Tubes/Lines/Drains    [x] Peripheral IV        [x] Urinary Catheter, NG, 		    Extremities  Exam: Extremities warm, pink, well-perfused.      Derm:  Exam: Good skin turgor, no skin breakdown.      :   Exam: Chapman catheter in place.   -------------------------------------------------------------------------------------  61y Male POD # 1 from exploratory laparotomy and mesenteric hematoma evacuation.     Patient was extubated at 4 AM which he has been tolerating.     Neurologic: On Keppra for seizures.   BP is better controlled. Cardiologically stable ( Seen by Dr. Frankel in [place] )   NPO. Monitor bowel function. On IV Protonix for hx of GI Bleed,   F.U : OR  cultures 10/31    + NG,

## 2021-11-02 ENCOUNTER — APPOINTMENT (OUTPATIENT)
Dept: CARDIOLOGY | Facility: CLINIC | Age: 61
End: 2021-11-02

## 2021-11-02 LAB
ANION GAP SERPL CALC-SCNC: 14 MMOL/L — SIGNIFICANT CHANGE UP (ref 5–17)
BASOPHILS # BLD AUTO: 0.06 K/UL — SIGNIFICANT CHANGE UP (ref 0–0.2)
BASOPHILS NFR BLD AUTO: 0.2 % — SIGNIFICANT CHANGE UP (ref 0–2)
BUN SERPL-MCNC: 37.6 MG/DL — HIGH (ref 8–20)
CALCIUM SERPL-MCNC: 7.7 MG/DL — LOW (ref 8.6–10.2)
CHLORIDE SERPL-SCNC: 100 MMOL/L — SIGNIFICANT CHANGE UP (ref 98–107)
CO2 SERPL-SCNC: 26 MMOL/L — SIGNIFICANT CHANGE UP (ref 22–29)
CREAT SERPL-MCNC: 1.42 MG/DL — HIGH (ref 0.5–1.3)
EOSINOPHIL # BLD AUTO: 0.1 K/UL — SIGNIFICANT CHANGE UP (ref 0–0.5)
EOSINOPHIL NFR BLD AUTO: 0.4 % — SIGNIFICANT CHANGE UP (ref 0–6)
GLUCOSE SERPL-MCNC: 99 MG/DL — SIGNIFICANT CHANGE UP (ref 70–99)
HCT VFR BLD CALC: 22 % — LOW (ref 39–50)
HCT VFR BLD CALC: 22.8 % — LOW (ref 39–50)
HGB BLD-MCNC: 7.2 G/DL — LOW (ref 13–17)
HGB BLD-MCNC: 7.4 G/DL — LOW (ref 13–17)
IMM GRANULOCYTES NFR BLD AUTO: 1.5 % — SIGNIFICANT CHANGE UP (ref 0–1.5)
LYMPHOCYTES # BLD AUTO: 1.05 K/UL — SIGNIFICANT CHANGE UP (ref 1–3.3)
LYMPHOCYTES # BLD AUTO: 4.2 % — LOW (ref 13–44)
MAGNESIUM SERPL-MCNC: 2.4 MG/DL — SIGNIFICANT CHANGE UP (ref 1.6–2.6)
MCHC RBC-ENTMCNC: 29.4 PG — SIGNIFICANT CHANGE UP (ref 27–34)
MCHC RBC-ENTMCNC: 32.7 GM/DL — SIGNIFICANT CHANGE UP (ref 32–36)
MCV RBC AUTO: 89.8 FL — SIGNIFICANT CHANGE UP (ref 80–100)
MONOCYTES # BLD AUTO: 1.09 K/UL — HIGH (ref 0–0.9)
MONOCYTES NFR BLD AUTO: 4.4 % — SIGNIFICANT CHANGE UP (ref 2–14)
NEUTROPHILS # BLD AUTO: 22.26 K/UL — HIGH (ref 1.8–7.4)
NEUTROPHILS NFR BLD AUTO: 89.3 % — HIGH (ref 43–77)
PHOSPHATE SERPL-MCNC: 3.6 MG/DL — SIGNIFICANT CHANGE UP (ref 2.4–4.7)
PLATELET # BLD AUTO: 293 K/UL — SIGNIFICANT CHANGE UP (ref 150–400)
POTASSIUM SERPL-MCNC: 3.8 MMOL/L — SIGNIFICANT CHANGE UP (ref 3.5–5.3)
POTASSIUM SERPL-SCNC: 3.8 MMOL/L — SIGNIFICANT CHANGE UP (ref 3.5–5.3)
RBC # BLD: 2.45 M/UL — LOW (ref 4.2–5.8)
RBC # FLD: 16.7 % — HIGH (ref 10.3–14.5)
SODIUM SERPL-SCNC: 140 MMOL/L — SIGNIFICANT CHANGE UP (ref 135–145)
WBC # BLD: 24.94 K/UL — HIGH (ref 3.8–10.5)
WBC # FLD AUTO: 24.94 K/UL — HIGH (ref 3.8–10.5)

## 2021-11-02 PROCEDURE — 99024 POSTOP FOLLOW-UP VISIT: CPT

## 2021-11-02 PROCEDURE — 99233 SBSQ HOSP IP/OBS HIGH 50: CPT

## 2021-11-02 PROCEDURE — 74018 RADEX ABDOMEN 1 VIEW: CPT | Mod: 26

## 2021-11-02 RX ORDER — ACETAMINOPHEN 500 MG
1000 TABLET ORAL ONCE
Refills: 0 | Status: COMPLETED | OUTPATIENT
Start: 2021-11-02 | End: 2021-11-02

## 2021-11-02 RX ADMIN — Medication 2.5 MILLIGRAM(S): at 01:14

## 2021-11-02 RX ADMIN — Medication 400 MILLIGRAM(S): at 09:05

## 2021-11-02 RX ADMIN — LEVETIRACETAM 400 MILLIGRAM(S): 250 TABLET, FILM COATED ORAL at 21:43

## 2021-11-02 RX ADMIN — Medication 1000 MILLIGRAM(S): at 09:20

## 2021-11-02 RX ADMIN — HEPARIN SODIUM 5000 UNIT(S): 5000 INJECTION INTRAVENOUS; SUBCUTANEOUS at 14:53

## 2021-11-02 RX ADMIN — Medication 400 MILLIGRAM(S): at 03:41

## 2021-11-02 RX ADMIN — HYDROMORPHONE HYDROCHLORIDE 1 MILLIGRAM(S): 2 INJECTION INTRAMUSCULAR; INTRAVENOUS; SUBCUTANEOUS at 20:18

## 2021-11-02 RX ADMIN — HYDROMORPHONE HYDROCHLORIDE 0.5 MILLIGRAM(S): 2 INJECTION INTRAMUSCULAR; INTRAVENOUS; SUBCUTANEOUS at 11:30

## 2021-11-02 RX ADMIN — HYDROMORPHONE HYDROCHLORIDE 1 MILLIGRAM(S): 2 INJECTION INTRAMUSCULAR; INTRAVENOUS; SUBCUTANEOUS at 16:00

## 2021-11-02 RX ADMIN — CHLORHEXIDINE GLUCONATE 1 APPLICATION(S): 213 SOLUTION TOPICAL at 14:48

## 2021-11-02 RX ADMIN — HYDROMORPHONE HYDROCHLORIDE 0.5 MILLIGRAM(S): 2 INJECTION INTRAMUSCULAR; INTRAVENOUS; SUBCUTANEOUS at 14:53

## 2021-11-02 RX ADMIN — PIPERACILLIN AND TAZOBACTAM 25 GRAM(S): 4; .5 INJECTION, POWDER, LYOPHILIZED, FOR SOLUTION INTRAVENOUS at 16:24

## 2021-11-02 RX ADMIN — HYDROMORPHONE HYDROCHLORIDE 0.5 MILLIGRAM(S): 2 INJECTION INTRAMUSCULAR; INTRAVENOUS; SUBCUTANEOUS at 22:10

## 2021-11-02 RX ADMIN — HYDROMORPHONE HYDROCHLORIDE 1 MILLIGRAM(S): 2 INJECTION INTRAMUSCULAR; INTRAVENOUS; SUBCUTANEOUS at 03:53

## 2021-11-02 RX ADMIN — Medication 2.5 MILLIGRAM(S): at 05:58

## 2021-11-02 RX ADMIN — Medication 1000 MILLIGRAM(S): at 03:53

## 2021-11-02 RX ADMIN — HYDROMORPHONE HYDROCHLORIDE 0.5 MILLIGRAM(S): 2 INJECTION INTRAMUSCULAR; INTRAVENOUS; SUBCUTANEOUS at 05:59

## 2021-11-02 RX ADMIN — HYDROMORPHONE HYDROCHLORIDE 1 MILLIGRAM(S): 2 INJECTION INTRAMUSCULAR; INTRAVENOUS; SUBCUTANEOUS at 21:00

## 2021-11-02 RX ADMIN — HYDROMORPHONE HYDROCHLORIDE 1 MILLIGRAM(S): 2 INJECTION INTRAMUSCULAR; INTRAVENOUS; SUBCUTANEOUS at 16:15

## 2021-11-02 RX ADMIN — TAMSULOSIN HYDROCHLORIDE 0.4 MILLIGRAM(S): 0.4 CAPSULE ORAL at 21:43

## 2021-11-02 RX ADMIN — PIPERACILLIN AND TAZOBACTAM 25 GRAM(S): 4; .5 INJECTION, POWDER, LYOPHILIZED, FOR SOLUTION INTRAVENOUS at 01:13

## 2021-11-02 RX ADMIN — HYDROMORPHONE HYDROCHLORIDE 1 MILLIGRAM(S): 2 INJECTION INTRAMUSCULAR; INTRAVENOUS; SUBCUTANEOUS at 01:15

## 2021-11-02 RX ADMIN — HYDROMORPHONE HYDROCHLORIDE 1 MILLIGRAM(S): 2 INJECTION INTRAMUSCULAR; INTRAVENOUS; SUBCUTANEOUS at 09:05

## 2021-11-02 RX ADMIN — HYDROMORPHONE HYDROCHLORIDE 0.5 MILLIGRAM(S): 2 INJECTION INTRAMUSCULAR; INTRAVENOUS; SUBCUTANEOUS at 11:15

## 2021-11-02 RX ADMIN — HYDROMORPHONE HYDROCHLORIDE 0.5 MILLIGRAM(S): 2 INJECTION INTRAMUSCULAR; INTRAVENOUS; SUBCUTANEOUS at 06:34

## 2021-11-02 RX ADMIN — HEPARIN SODIUM 5000 UNIT(S): 5000 INJECTION INTRAVENOUS; SUBCUTANEOUS at 05:58

## 2021-11-02 RX ADMIN — Medication 2.5 MILLIGRAM(S): at 11:15

## 2021-11-02 RX ADMIN — HYDROMORPHONE HYDROCHLORIDE 0.5 MILLIGRAM(S): 2 INJECTION INTRAMUSCULAR; INTRAVENOUS; SUBCUTANEOUS at 15:17

## 2021-11-02 RX ADMIN — PIPERACILLIN AND TAZOBACTAM 25 GRAM(S): 4; .5 INJECTION, POWDER, LYOPHILIZED, FOR SOLUTION INTRAVENOUS at 23:27

## 2021-11-02 RX ADMIN — HYDROMORPHONE HYDROCHLORIDE 0.5 MILLIGRAM(S): 2 INJECTION INTRAMUSCULAR; INTRAVENOUS; SUBCUTANEOUS at 23:00

## 2021-11-02 RX ADMIN — PANTOPRAZOLE SODIUM 40 MILLIGRAM(S): 20 TABLET, DELAYED RELEASE ORAL at 11:15

## 2021-11-02 RX ADMIN — HEPARIN SODIUM 5000 UNIT(S): 5000 INJECTION INTRAVENOUS; SUBCUTANEOUS at 21:43

## 2021-11-02 RX ADMIN — HYDROMORPHONE HYDROCHLORIDE 1 MILLIGRAM(S): 2 INJECTION INTRAMUSCULAR; INTRAVENOUS; SUBCUTANEOUS at 09:20

## 2021-11-02 NOTE — CHART NOTE - NSCHARTNOTEFT_GEN_A_CORE
Patient downgraded from the sicu with no acute events. Patient now POD#2 of ex-lap with drainage of 3 mesenteric hematomas and abscess of abdominal wall. Patient doing well, NG Tube in place, wound vac well seated, awake and alert, pain controlled, abdominal exams wnl. Will get HD tomorrow

## 2021-11-02 NOTE — PROGRESS NOTE ADULT - ASSESSMENT
1) ESRD on HD ( Access : L - Forearm AVG )   2) SBO s/p small bowel resection  3) Anemia of CKD    HD MWF schedule,    Hgb.,  below goal- On  REINALDO, PRBC Transfusion  as indicated    HD in  AM, NO UF, NO Heparin,     Surgery F.U Noted,     Pre- HD  Laboratory values personally reviewed by me.  Dialysis adjusted appropriately based on current values, for AM.  Will continue the current medical management.  Next hemodialysis as scheduled.    Discussed with nursing, primary care team.

## 2021-11-02 NOTE — CHART NOTE - NSCHARTNOTEFT_GEN_A_CORE
SICU TRANSFER NOTE  -----------------------------  ICU Admission Date: 10/31/21  Transfer Date: 11-02-21 @ 11:47    Admission Diagnosis: SBO c/b post-op mesenteric hematoma    Active Problems/injuries:   SBO  Infected Mesenteric Hematomas    Procedures:   10/26: Dx Lap, Ex-lap w/ RENEA and 4cm SBR and primary anastomosis, enterotomy repairs  10/31: Ex-lap w/ RENEA, evacuation of mesenteric hematomas (infected), abdominal washout, fascial closure, wound VAC application    Consultants:  [x] Cardiology  [x] Electrophysiology  [ ] Endocrine  [ ] Infectious Disease  [x] Nephrology  [ ] Medicine  [ ] Neurosurgery  [ ] Ortho       [ ] Weight Bearing Status:  [ ] Palliative       [ ] Advanced Directives:    [ ] Physical Medicine and Rehab       [ ] Disposition :   [ ] Plastics  [ ] Pulmonary        Medications  chlorhexidine 2% Cloths 1 Application(s) Topical daily  heparin   Injectable 5000 Unit(s) SubCutaneous every 8 hours  HYDROmorphone  Injectable 0.5 milliGRAM(s) IV Push every 3 hours PRN  HYDROmorphone  Injectable 0.5 milliGRAM(s) IV Push every 4 hours PRN  HYDROmorphone  Injectable 1 milliGRAM(s) IV Push every 3 hours PRN  levETIRAcetam  IVPB 750 milliGRAM(s) IV Intermittent <User Schedule>  metoprolol tartrate Injectable 2.5 milliGRAM(s) IV Push every 6 hours  multiple electrolytes Injection Type 1 1000 milliLiter(s) IV Continuous <Continuous>  pantoprazole  Injectable 40 milliGRAM(s) IV Push daily  piperacillin/tazobactam IVPB.. 3.375 Gram(s) IV Intermittent every 12 hours  tamsulosin 0.4 milliGRAM(s) Oral at bedtime      [x] I attest I have reviewed and reconciled all medications prior to transfer    IV Fluids  dextrose 5% + sodium chloride 0.45%.: Solution, 1000 milliLiter(s) infuse at 80 mL/Hr  Provider's Contact #: [phone]  sodium chloride 0.9%.: Solution, 1000 milliLiter(s) infuse at 80 mL/Hr  Provider's Contact #: [phone]  sodium chloride 0.9%: 1000 milliLiter(s)      with sodium bicarbonate additive 75 milliEquivalent(s), infuse at 42 mL/Hr  sodium chloride 0.9%.: Solution, 1000 milliLiter(s) infuse at 80 mL/Hr  lactated ringers.: Solution, 1000 milliLiter(s) infuse at 75 mL/Hr  Provider's Contact #: [phone]  lactated ringers Bolus:   1000 milliLiter(s), IV Bolus, once, infuse over 60 Minute(s), Stop After 1 Doses  lactated ringers.: Solution, 1000 milliLiter(s) infuse at 1000 mL/Hr  lactated ringers Bolus:   2000 milliLiter(s), IV Bolus, once, infuse over 4 Hour(s), Stop After 1 Doses  lactated ringers Bolus:   1000 milliLiter(s), IV Bolus, once, infuse over 60 Minute(s), Stop After 1 Doses  lactated ringers.: Solution, 1000 milliLiter(s) infuse at 115 mL/Hr  sodium chloride 0.9% Bolus:   500 milliLiter(s), IV Bolus, once, infuse over 60 Minute(s), Stop After 1 Doses  sodium chloride 0.9% Bolus:   1000 milliLiter(s), IV Bolus, once, infuse over 60 Minute(s), Stop After 1 Doses    Indication: Current IVF running for maintenance given NPO status.     Antibiotics:  piperacillin/tazobactam IVPB.. 3.375 Gram(s) IV Intermittent every 12 hours    Indication: intra-abdominal infection    End Date: TBD by primary team        I have discussed this case with Celina Ahuja, PGY-1 / ELSA Schilling upon transfer and all questions regarding ICU course were answered.  The following items are to be followed up:    - F/u intra-op culture sensitivities  - Repeat H/H at 16:00 today  - Ordered for 1UpRBC after HD tomorrow, 11/3.  - Consider advancing diet - CLD

## 2021-11-02 NOTE — PROGRESS NOTE ADULT - SUBJECTIVE AND OBJECTIVE BOX
24h Events:  Successfully extubated to RA yesterday morning required some oxygen for mild hypoxia while sleeping.     Subjective:  Pt admits to abdominal pain across his abdomen, R>L. +Fl -BM, occasional belching. +appetite. Denies chest pain, fever/chills, shortness of breath, nausea, vomiting, diarrhea, headache.     ICU Vital Signs Last 24 Hrs  T(C): 36.9 (02 Nov 2021 00:00), Max: 37.7 (01 Nov 2021 07:21)  T(F): 98.5 (02 Nov 2021 00:00), Max: 99.9 (01 Nov 2021 07:21)  HR: 87 (02 Nov 2021 01:00) (78 - 96)  BP: 116/99 (01 Nov 2021 07:41) (116/99 - 116/99)  BP(mean): 106 (01 Nov 2021 07:41) (106 - 106)  ABP: 135/48 (02 Nov 2021 01:00) (121/48 - 170/64)  ABP(mean): 79 (02 Nov 2021 01:00) (75 - 101)  RR: 14 (02 Nov 2021 01:00) (8 - 19)  SpO2: 95% (02 Nov 2021 01:00) (92% - 100%)      ABG - ( 01 Nov 2021 05:16 )  pH, Arterial: 7.340 pH, Blood: x     /  pCO2: 48    /  pO2: 140   / HCO3: 26    / Base Excess: 0.1   /  SaO2: 100.0               MEDICATIONS  (STANDING):  acetaminophen   IVPB .. 1000 milliGRAM(s) IV Intermittent once  chlorhexidine 2% Cloths 1 Application(s) Topical daily  heparin   Injectable 5000 Unit(s) SubCutaneous every 8 hours  levETIRAcetam  IVPB 750 milliGRAM(s) IV Intermittent <User Schedule>  metoprolol tartrate Injectable 2.5 milliGRAM(s) IV Push every 6 hours  multiple electrolytes Injection Type 1 1000 milliLiter(s) (75 mL/Hr) IV Continuous <Continuous>  pantoprazole  Injectable 40 milliGRAM(s) IV Push daily  piperacillin/tazobactam IVPB.. 3.375 Gram(s) IV Intermittent every 12 hours  tamsulosin 0.4 milliGRAM(s) Oral at bedtime    MEDICATIONS  (PRN):  HYDROmorphone  Injectable 0.5 milliGRAM(s) IV Push every 3 hours PRN Moderate Pain (4 - 6)  HYDROmorphone  Injectable 0.5 milliGRAM(s) IV Push every 4 hours PRN break through pain  HYDROmorphone  Injectable 1 milliGRAM(s) IV Push every 3 hours PRN Severe Pain (7 - 10)          Physical Exam:    Gen: Resting comfortably in bed    HEENT: PERRL, EOMI, +NGT with bilious output    Neurological: Alert and oriented x3 without focal deficit    Neck: Trachea midline, no evidence of JVD, FROM without pain, neck symmetric    Pulmonary: CTAB with decreased breath sounds at the bases    Cardiovascular: S1S2    Gastrointestinal: Soft, tender to palpation across the abdomen R>L, non-distended    : Chapman in place draining yellow urine    Extremities: Without c/c/e    Skin: Intact    Musculoskeletal: FROM without pain, no deformity or areas of tenderness    LABS:  Pending      ASSESSMENT/PLAN:  61y Male initially admitted for SBO. Underwent SBR on 10/26. RTOR on 10/31 for evacuation of mesenteric hematoma and drainage of anterior wall abscess. Moderate protein calorie malnutrition    Neuro: Pain control, delirium precautions, sleep hygiene     CV: Hemodynamic monitoring via a-line.     Pulm: Encourage incentive spirometry, OOB as tolerated. Titrate supplemental oxygen to maintain SpO2 92-99%     GI/Nutrition: KUB to r/o post pyloric NGT placement. Consider starting tube feedings vs oral diet today    /Renal: Chapman for strict I&O, f/u am labs, HD as per nephro. Consider ToV today    ID: Zosyn    Endo: No active issues     Skin: Repositioning for DTI prevention while in bed    Heme/DVT Prophylaxis: SCDs, SQH   24h Events:  Successfully extubated to RA yesterday morning required some oxygen for mild hypoxia while sleeping.     Subjective:  Pt admits to abdominal pain across his abdomen, R>L. +Fl -BM, occasional belching. +appetite. Denies chest pain, fever/chills, shortness of breath, nausea, vomiting, diarrhea, headache.     ICU Vital Signs Last 24 Hrs  T(C): 36.9 (02 Nov 2021 00:00), Max: 37.7 (01 Nov 2021 07:21)  T(F): 98.5 (02 Nov 2021 00:00), Max: 99.9 (01 Nov 2021 07:21)  HR: 87 (02 Nov 2021 01:00) (78 - 96)  BP: 116/99 (01 Nov 2021 07:41) (116/99 - 116/99)  BP(mean): 106 (01 Nov 2021 07:41) (106 - 106)  ABP: 135/48 (02 Nov 2021 01:00) (121/48 - 170/64)  ABP(mean): 79 (02 Nov 2021 01:00) (75 - 101)  RR: 14 (02 Nov 2021 01:00) (8 - 19)  SpO2: 95% (02 Nov 2021 01:00) (92% - 100%)      ABG - ( 01 Nov 2021 05:16 )  pH, Arterial: 7.340 pH, Blood: x     /  pCO2: 48    /  pO2: 140   / HCO3: 26    / Base Excess: 0.1   /  SaO2: 100.0               MEDICATIONS  (STANDING):  acetaminophen   IVPB .. 1000 milliGRAM(s) IV Intermittent once  chlorhexidine 2% Cloths 1 Application(s) Topical daily  heparin   Injectable 5000 Unit(s) SubCutaneous every 8 hours  levETIRAcetam  IVPB 750 milliGRAM(s) IV Intermittent <User Schedule>  metoprolol tartrate Injectable 2.5 milliGRAM(s) IV Push every 6 hours  multiple electrolytes Injection Type 1 1000 milliLiter(s) (75 mL/Hr) IV Continuous <Continuous>  pantoprazole  Injectable 40 milliGRAM(s) IV Push daily  piperacillin/tazobactam IVPB.. 3.375 Gram(s) IV Intermittent every 12 hours  tamsulosin 0.4 milliGRAM(s) Oral at bedtime    MEDICATIONS  (PRN):  HYDROmorphone  Injectable 0.5 milliGRAM(s) IV Push every 3 hours PRN Moderate Pain (4 - 6)  HYDROmorphone  Injectable 0.5 milliGRAM(s) IV Push every 4 hours PRN break through pain  HYDROmorphone  Injectable 1 milliGRAM(s) IV Push every 3 hours PRN Severe Pain (7 - 10)          Physical Exam:    Gen: Resting comfortably in bed    HEENT: PERRL, EOMI, +NGT with bilious output    Neurological: Alert and oriented x3 without focal deficit    Neck: Trachea midline, no evidence of JVD, FROM without pain, neck symmetric    Pulmonary: CTAB with decreased breath sounds at the bases    Cardiovascular: S1S2    Gastrointestinal: Soft, tender to palpation across the abdomen R>L, non-distended    : Chapman in place draining yellow urine    Extremities: Without c/c/e    Skin: Intact    Musculoskeletal: FROM without pain, no deformity or areas of tenderness    LABS:  Pending      ASSESSMENT/PLAN:  61y Male initially admitted for SBO. Underwent SBR on 10/26. RTOR on 10/31 for evacuation of mesenteric hematoma and drainage of anterior wall abscess. Moderate protein calorie malnutrition    Neuro: Pain control, delirium precautions, sleep hygiene. Continue Keppra    CV: Hemodynamic monitoring via a-line.     Pulm: Encourage incentive spirometry, OOB as tolerated. Titrate supplemental oxygen to maintain SpO2 92-99%     GI/Nutrition: KUB to r/o post pyloric NGT placement. Consider starting tube feedings vs oral diet today    /Renal: Chapman for strict I&O, f/u am labs, HD as per nephro. Consider ToV today    ID: Zosyn    Endo: No active issues     Skin: Repositioning for DTI prevention while in bed    Heme/DVT Prophylaxis: SCDs, SQH

## 2021-11-02 NOTE — PROGRESS NOTE ADULT - SUBJECTIVE AND OBJECTIVE BOX
24h Events:    Successfully extubated to RA yesterday morning required some oxygen for mild hypoxia while sleeping.     Subjective:  Pt admits to abdominal pain across his abdomen, R>L. +Fl -BM, occasional belching. +appetite. Denies chest pain, fever/chills, shortness of breath, nausea, vomiting, diarrhea, headache.     ICU Vital Signs Last 24 Hrs,    T(C): 36.9 (02 Nov 2021 00:00), Max: 37.7 (01 Nov 2021 07:21)  T(F): 98.5 (02 Nov 2021 00:00), Max: 99.9 (01 Nov 2021 07:21)  HR: 87 (02 Nov 2021 01:00) (78 - 96)  BP: 116/99 (01 Nov 2021 07:41) (116/99 - 116/99)  BP(mean): 106 (01 Nov 2021 07:41) (106 - 106)  ABP: 135/48 (02 Nov 2021 01:00) (121/48 - 170/64)  ABP(mean): 79 (02 Nov 2021 01:00) (75 - 101)  RR: 14 (02 Nov 2021 01:00) (8 - 19)  SpO2: 95% (02 Nov 2021 01:00) (92% - 100%)    ABG - ( 01 Nov 2021 05:16 )  pH, Arterial: 7.340 pH, Blood: x     /  pCO2: 48    /  pO2: 140   / HCO3: 26    / Base Excess: 0.1   /  SaO2: 100.0    MEDICATIONS  (STANDING):  acetaminophen   IVPB .. 1000 milliGRAM(s) IV Intermittent once  chlorhexidine 2% Cloths 1 Application(s) Topical daily  heparin   Injectable 5000 Unit(s) SubCutaneous every 8 hours  levETIRAcetam  IVPB 750 milliGRAM(s) IV Intermittent <User Schedule>  metoprolol tartrate Injectable 2.5 milliGRAM(s) IV Push every 6 hours  multiple electrolytes Injection Type 1 1000 milliLiter(s) (75 mL/Hr) IV Continuous <Continuous>  pantoprazole  Injectable 40 milliGRAM(s) IV Push daily  piperacillin/tazobactam IVPB.. 3.375 Gram(s) IV Intermittent every 12 hours  tamsulosin 0.4 milliGRAM(s) Oral at bedtime    MEDICATIONS  (PRN):  HYDROmorphone  Injectable 0.5 milliGRAM(s) IV Push every 3 hours PRN Moderate Pain (4 - 6)  HYDROmorphone  Injectable 0.5 milliGRAM(s) IV Push every 4 hours PRN break through pain  HYDROmorphone  Injectable 1 milliGRAM(s) IV Push every 3 hours PRN Severe Pain (7 - 10)    Physical Exam:    Gen: Resting comfortably in bed    HEENT: PERRL, EOMI, +NGT with bilious output    Neurological: Alert and oriented x3 without focal deficit    Neck: Trachea midline, no evidence of JVD, FROM without pain, neck symmetric    Pulmonary: CTAB with decreased breath sounds at the bases    Cardiovascular: S1S2    Gastrointestinal: Soft, tender to palpation across the abdomen R>L, non-distended    : Chapman in place draining yellow urine    Extremities: Without c/c/e    Skin: Intact    Musculoskeletal: FROM without pain, no deformity or areas of tenderness  Phosphorus Level, Serum: 3.6 mg/dL (11.02.21 @ 04:50)   Historical Values  Phosphorus Level, Serum: 3.6 mg/dL (11.02.21 @ 04:50)   Phosphorus Level, Serum: 6.4 mg/dL (11.01.21 @ 04:35)  Magnesium, Serum: 2.4 mg/dL (11.02.21 @ 04:50)    Hemoglobin: 7.2 g/dL (11.02.21 @ 04:50)   ASSESSMENT/PLAN:    61y Male initially admitted for SBO. Underwent SBR on 10/26. RTOR on 10/31 for evacuation of mesenteric hematoma and drainage of anterior wall abscess. Moderate protein calorie malnutrition    Neuro: Pain control, delirium precautions, sleep hygiene. Continue Keppra    On Supplemental oxygen to maintain SpO2 92-99%     Ari Diaz D.C,    ID: Zosyn - HD Dosing,

## 2021-11-02 NOTE — CHART NOTE - NSCHARTNOTEFT_GEN_A_CORE
Downgrade acceptance note    61M POD2 laparotomy evacuation of mesenteric hematoma for early postoperative small bowel obstruction. Downgraded today from the ICU. Pt is in NAD with no acute complaints denies having any return of bowel function, NGT OP 1L/24hrs will continue to monitor OP possible removal vs clamp trial tomorrow with ADAT, remained hemodynamically normal, making adequate UOP, painting to be removed once on the floors, receives HD next course tomorrow, on Zosyn for intraoperative superficial and deep cultures growing GNR, currently on HSQ, if AM Hb stable will plan to restart Hep gtt based on pts high stroke risk. Wound vac to be removed tomorrow on midline abd wall possible delayed primary closure vs reapplication of wound vac, PT/OT OOB ambulation. Awaiting bed availability for physical downgrade to floors Downgrade acceptance note    61M POD2 laparotomy evacuation of mesenteric hematoma for early postoperative small bowel obstruction. Downgraded today from the ICU. Pt is in NAD with no acute complaints denies having any return of bowel function, NGT OP 1L/24hrs will continue to monitor OP possible removal vs clamp trial tomorrow with ADAT, remained hemodynamically normal, making adequate UOP, painting to be removed once on the floors, receives HD next course tomorrow, on Zosyn for intraoperative superficial and deep cultures growing GNR, currently on HSQ, if AM Hb stable will plan to restart Hep gtt based on pts high stroke risk. Wound vac to be removed tomorrow on midline abd wall possible delayed primary closure vs reapplication of wound vac, PT/OT OOB ambulation. Downgrade signout received from SICU provider Hilton Rubin PA-C Awaiting bed availability for physical downgrade to floors

## 2021-11-03 LAB
-  AMIKACIN: SIGNIFICANT CHANGE UP
-  AMOXICILLIN/CLAVULANIC ACID: SIGNIFICANT CHANGE UP
-  AMPICILLIN/SULBACTAM: SIGNIFICANT CHANGE UP
-  AMPICILLIN: SIGNIFICANT CHANGE UP
-  AZTREONAM: SIGNIFICANT CHANGE UP
-  CEFAZOLIN: SIGNIFICANT CHANGE UP
-  CEFEPIME: SIGNIFICANT CHANGE UP
-  CEFOXITIN: SIGNIFICANT CHANGE UP
-  CEFTRIAXONE: SIGNIFICANT CHANGE UP
-  CIPROFLOXACIN: SIGNIFICANT CHANGE UP
-  ERTAPENEM: SIGNIFICANT CHANGE UP
-  GENTAMICIN: SIGNIFICANT CHANGE UP
-  LEVOFLOXACIN: SIGNIFICANT CHANGE UP
-  MEROPENEM: SIGNIFICANT CHANGE UP
-  PIPERACILLIN/TAZOBACTAM: SIGNIFICANT CHANGE UP
-  TETRACYCLINE: SIGNIFICANT CHANGE UP
-  TOBRAMYCIN: SIGNIFICANT CHANGE UP
-  TRIMETHOPRIM/SULFAMETHOXAZOLE: SIGNIFICANT CHANGE UP
-  VANCOMYCIN: SIGNIFICANT CHANGE UP
ANION GAP SERPL CALC-SCNC: 14 MMOL/L — SIGNIFICANT CHANGE UP (ref 5–17)
APTT BLD: 36.8 SEC — HIGH (ref 27.5–35.5)
APTT BLD: 44.3 SEC — HIGH (ref 27.5–35.5)
BASOPHILS # BLD AUTO: 0 K/UL — SIGNIFICANT CHANGE UP (ref 0–0.2)
BASOPHILS NFR BLD AUTO: 0 % — SIGNIFICANT CHANGE UP (ref 0–2)
BLD GP AB SCN SERPL QL: SIGNIFICANT CHANGE UP
BUN SERPL-MCNC: 39.3 MG/DL — HIGH (ref 8–20)
CALCIUM SERPL-MCNC: 7.7 MG/DL — LOW (ref 8.6–10.2)
CHLORIDE SERPL-SCNC: 100 MMOL/L — SIGNIFICANT CHANGE UP (ref 98–107)
CO2 SERPL-SCNC: 29 MMOL/L — SIGNIFICANT CHANGE UP (ref 22–29)
CREAT SERPL-MCNC: 1.36 MG/DL — HIGH (ref 0.5–1.3)
CULTURE RESULTS: SIGNIFICANT CHANGE UP
EOSINOPHIL # BLD AUTO: 0.84 K/UL — HIGH (ref 0–0.5)
EOSINOPHIL NFR BLD AUTO: 3.5 % — SIGNIFICANT CHANGE UP (ref 0–6)
GIANT PLATELETS BLD QL SMEAR: PRESENT — SIGNIFICANT CHANGE UP
GLUCOSE SERPL-MCNC: 94 MG/DL — SIGNIFICANT CHANGE UP (ref 70–99)
HCT VFR BLD CALC: 23 % — LOW (ref 39–50)
HCT VFR BLD CALC: 25.6 % — LOW (ref 39–50)
HGB BLD-MCNC: 7.3 G/DL — LOW (ref 13–17)
HGB BLD-MCNC: 8.4 G/DL — LOW (ref 13–17)
HYPOCHROMIA BLD QL: SLIGHT — SIGNIFICANT CHANGE UP
LYMPHOCYTES # BLD AUTO: 0.84 K/UL — LOW (ref 1–3.3)
LYMPHOCYTES # BLD AUTO: 3.5 % — LOW (ref 13–44)
MAGNESIUM SERPL-MCNC: 2.7 MG/DL — HIGH (ref 1.6–2.6)
MANUAL SMEAR VERIFICATION: SIGNIFICANT CHANGE UP
MCHC RBC-ENTMCNC: 28.4 PG — SIGNIFICANT CHANGE UP (ref 27–34)
MCHC RBC-ENTMCNC: 29.2 PG — SIGNIFICANT CHANGE UP (ref 27–34)
MCHC RBC-ENTMCNC: 31.7 GM/DL — LOW (ref 32–36)
MCHC RBC-ENTMCNC: 32.8 GM/DL — SIGNIFICANT CHANGE UP (ref 32–36)
MCV RBC AUTO: 88.9 FL — SIGNIFICANT CHANGE UP (ref 80–100)
MCV RBC AUTO: 89.5 FL — SIGNIFICANT CHANGE UP (ref 80–100)
METHOD TYPE: SIGNIFICANT CHANGE UP
MONOCYTES # BLD AUTO: 0.19 K/UL — SIGNIFICANT CHANGE UP (ref 0–0.9)
MONOCYTES NFR BLD AUTO: 0.8 % — LOW (ref 2–14)
MYELOCYTES NFR BLD: 0.9 % — HIGH (ref 0–0)
NEUTROPHILS # BLD AUTO: 21.57 K/UL — HIGH (ref 1.8–7.4)
NEUTROPHILS NFR BLD AUTO: 90.4 % — HIGH (ref 43–77)
ORGANISM # SPEC MICROSCOPIC CNT: SIGNIFICANT CHANGE UP
OVALOCYTES BLD QL SMEAR: SLIGHT — SIGNIFICANT CHANGE UP
PHOSPHATE SERPL-MCNC: 3.1 MG/DL — SIGNIFICANT CHANGE UP (ref 2.4–4.7)
PLAT MORPH BLD: NORMAL — SIGNIFICANT CHANGE UP
PLATELET # BLD AUTO: 343 K/UL — SIGNIFICANT CHANGE UP (ref 150–400)
PLATELET # BLD AUTO: 357 K/UL — SIGNIFICANT CHANGE UP (ref 150–400)
POIKILOCYTOSIS BLD QL AUTO: SLIGHT — SIGNIFICANT CHANGE UP
POLYCHROMASIA BLD QL SMEAR: SLIGHT — SIGNIFICANT CHANGE UP
POTASSIUM SERPL-MCNC: 3.6 MMOL/L — SIGNIFICANT CHANGE UP (ref 3.5–5.3)
POTASSIUM SERPL-SCNC: 3.6 MMOL/L — SIGNIFICANT CHANGE UP (ref 3.5–5.3)
RBC # BLD: 2.57 M/UL — LOW (ref 4.2–5.8)
RBC # BLD: 2.88 M/UL — LOW (ref 4.2–5.8)
RBC # FLD: 15.9 % — HIGH (ref 10.3–14.5)
RBC # FLD: 16.5 % — HIGH (ref 10.3–14.5)
RBC BLD AUTO: ABNORMAL
SODIUM SERPL-SCNC: 143 MMOL/L — SIGNIFICANT CHANGE UP (ref 135–145)
SPECIMEN SOURCE: SIGNIFICANT CHANGE UP
VARIANT LYMPHS # BLD: 0.9 % — SIGNIFICANT CHANGE UP (ref 0–6)
WBC # BLD: 23.86 K/UL — HIGH (ref 3.8–10.5)
WBC # BLD: 24.96 K/UL — HIGH (ref 3.8–10.5)
WBC # FLD AUTO: 23.86 K/UL — HIGH (ref 3.8–10.5)
WBC # FLD AUTO: 24.96 K/UL — HIGH (ref 3.8–10.5)

## 2021-11-03 PROCEDURE — 90937 HEMODIALYSIS REPEATED EVAL: CPT

## 2021-11-03 PROCEDURE — 74018 RADEX ABDOMEN 1 VIEW: CPT | Mod: 26

## 2021-11-03 PROCEDURE — 99024 POSTOP FOLLOW-UP VISIT: CPT

## 2021-11-03 RX ORDER — ACETAMINOPHEN 500 MG
1000 TABLET ORAL ONCE
Refills: 0 | Status: COMPLETED | OUTPATIENT
Start: 2021-11-03 | End: 2021-11-03

## 2021-11-03 RX ORDER — HEPARIN SODIUM 5000 [USP'U]/ML
1000 INJECTION INTRAVENOUS; SUBCUTANEOUS
Qty: 25000 | Refills: 0 | Status: DISCONTINUED | OUTPATIENT
Start: 2021-11-03 | End: 2021-11-04

## 2021-11-03 RX ORDER — HYDROMORPHONE HYDROCHLORIDE 2 MG/ML
1 INJECTION INTRAMUSCULAR; INTRAVENOUS; SUBCUTANEOUS ONCE
Refills: 0 | Status: DISCONTINUED | OUTPATIENT
Start: 2021-11-03 | End: 2021-11-03

## 2021-11-03 RX ORDER — HEPARIN SODIUM 5000 [USP'U]/ML
5000 INJECTION INTRAVENOUS; SUBCUTANEOUS EVERY 8 HOURS
Refills: 0 | Status: DISCONTINUED | OUTPATIENT
Start: 2021-11-03 | End: 2021-11-03

## 2021-11-03 RX ADMIN — HYDROMORPHONE HYDROCHLORIDE 1 MILLIGRAM(S): 2 INJECTION INTRAMUSCULAR; INTRAVENOUS; SUBCUTANEOUS at 17:08

## 2021-11-03 RX ADMIN — HYDROMORPHONE HYDROCHLORIDE 1 MILLIGRAM(S): 2 INJECTION INTRAMUSCULAR; INTRAVENOUS; SUBCUTANEOUS at 12:30

## 2021-11-03 RX ADMIN — HYDROMORPHONE HYDROCHLORIDE 1 MILLIGRAM(S): 2 INJECTION INTRAMUSCULAR; INTRAVENOUS; SUBCUTANEOUS at 21:00

## 2021-11-03 RX ADMIN — HEPARIN SODIUM 10 UNIT(S)/HR: 5000 INJECTION INTRAVENOUS; SUBCUTANEOUS at 22:10

## 2021-11-03 RX ADMIN — Medication 400 MILLIGRAM(S): at 05:14

## 2021-11-03 RX ADMIN — HYDROMORPHONE HYDROCHLORIDE 1 MILLIGRAM(S): 2 INJECTION INTRAMUSCULAR; INTRAVENOUS; SUBCUTANEOUS at 00:37

## 2021-11-03 RX ADMIN — HYDROMORPHONE HYDROCHLORIDE 1 MILLIGRAM(S): 2 INJECTION INTRAMUSCULAR; INTRAVENOUS; SUBCUTANEOUS at 20:41

## 2021-11-03 RX ADMIN — HYDROMORPHONE HYDROCHLORIDE 1 MILLIGRAM(S): 2 INJECTION INTRAMUSCULAR; INTRAVENOUS; SUBCUTANEOUS at 12:15

## 2021-11-03 RX ADMIN — HYDROMORPHONE HYDROCHLORIDE 1 MILLIGRAM(S): 2 INJECTION INTRAMUSCULAR; INTRAVENOUS; SUBCUTANEOUS at 08:20

## 2021-11-03 RX ADMIN — PANTOPRAZOLE SODIUM 40 MILLIGRAM(S): 20 TABLET, DELAYED RELEASE ORAL at 14:33

## 2021-11-03 RX ADMIN — HEPARIN SODIUM 5000 UNIT(S): 5000 INJECTION INTRAVENOUS; SUBCUTANEOUS at 06:08

## 2021-11-03 RX ADMIN — LEVETIRACETAM 400 MILLIGRAM(S): 250 TABLET, FILM COATED ORAL at 18:16

## 2021-11-03 RX ADMIN — TAMSULOSIN HYDROCHLORIDE 0.4 MILLIGRAM(S): 0.4 CAPSULE ORAL at 22:36

## 2021-11-03 RX ADMIN — Medication 2.5 MILLIGRAM(S): at 17:08

## 2021-11-03 RX ADMIN — Medication 2.5 MILLIGRAM(S): at 12:49

## 2021-11-03 RX ADMIN — HYDROMORPHONE HYDROCHLORIDE 1 MILLIGRAM(S): 2 INJECTION INTRAMUSCULAR; INTRAVENOUS; SUBCUTANEOUS at 18:14

## 2021-11-03 RX ADMIN — HEPARIN SODIUM 10 UNIT(S)/HR: 5000 INJECTION INTRAVENOUS; SUBCUTANEOUS at 14:33

## 2021-11-03 RX ADMIN — HYDROMORPHONE HYDROCHLORIDE 0.5 MILLIGRAM(S): 2 INJECTION INTRAMUSCULAR; INTRAVENOUS; SUBCUTANEOUS at 23:14

## 2021-11-03 RX ADMIN — Medication 2.5 MILLIGRAM(S): at 06:08

## 2021-11-03 RX ADMIN — HYDROMORPHONE HYDROCHLORIDE 0.5 MILLIGRAM(S): 2 INJECTION INTRAMUSCULAR; INTRAVENOUS; SUBCUTANEOUS at 22:00

## 2021-11-03 RX ADMIN — PIPERACILLIN AND TAZOBACTAM 25 GRAM(S): 4; .5 INJECTION, POWDER, LYOPHILIZED, FOR SOLUTION INTRAVENOUS at 23:10

## 2021-11-03 RX ADMIN — HYDROMORPHONE HYDROCHLORIDE 1 MILLIGRAM(S): 2 INJECTION INTRAMUSCULAR; INTRAVENOUS; SUBCUTANEOUS at 03:50

## 2021-11-03 RX ADMIN — Medication 2.5 MILLIGRAM(S): at 00:17

## 2021-11-03 RX ADMIN — HYDROMORPHONE HYDROCHLORIDE 1 MILLIGRAM(S): 2 INJECTION INTRAMUSCULAR; INTRAVENOUS; SUBCUTANEOUS at 04:00

## 2021-11-03 RX ADMIN — HYDROMORPHONE HYDROCHLORIDE 1 MILLIGRAM(S): 2 INJECTION INTRAMUSCULAR; INTRAVENOUS; SUBCUTANEOUS at 01:00

## 2021-11-03 RX ADMIN — PIPERACILLIN AND TAZOBACTAM 25 GRAM(S): 4; .5 INJECTION, POWDER, LYOPHILIZED, FOR SOLUTION INTRAVENOUS at 14:43

## 2021-11-03 RX ADMIN — HYDROMORPHONE HYDROCHLORIDE 1 MILLIGRAM(S): 2 INJECTION INTRAMUSCULAR; INTRAVENOUS; SUBCUTANEOUS at 08:35

## 2021-11-03 RX ADMIN — HYDROMORPHONE HYDROCHLORIDE 1 MILLIGRAM(S): 2 INJECTION INTRAMUSCULAR; INTRAVENOUS; SUBCUTANEOUS at 05:14

## 2021-11-03 NOTE — PROGRESS NOTE ADULT - SUBJECTIVE AND OBJECTIVE BOX
Vital Signs Last 24 Hrs  T(C): 36.4 (03 Nov 2021 09:55), Max: 36.8 (02 Nov 2021 12:00)  T(F): 97.6 (03 Nov 2021 09:55), Max: 98.3 (02 Nov 2021 12:00)  HR: 87 (03 Nov 2021 09:55) (77 - 96)  BP: 179/71 (03 Nov 2021 09:55) (143/60 - 179/71)  ABP: 129/51 (02 Nov 2021 17:00) (123/47 - 166/59)  ABP(mean): 81 (02 Nov 2021 17:00) (73 - 98)  RR: 19 (03 Nov 2021 09:55) (14 - 19)  SpO2: 91% (03 Nov 2021 09:55) (89% - 100%)  Pt is seen and examined on dialysis. No symptoms. Hemodynamics stable. Tolerating dialysis and ultrafiltration.  Pre Laboratory values personally reviewed by me.  Dialysis adjusted appropriately based on current values.  Will continue the current medical management.  Discussed with nursing, primary care team.   Pt creatinine trend significantly improving;   Will hold off on HD on Friday and monitor renal function OFF dialysis and assess for renal recovery;  Consider 24 hour urine tomorrow;

## 2021-11-03 NOTE — CHART NOTE - NSCHARTNOTEFT_GEN_A_CORE
AGUSTO Scott called to inform patient was having increased pain to abdomen. I went to bedside abdomen semi- softly distended, with pain to palpation L >R. To note abdominal exam somewhat similar from early but with less pain on palpation.  I had Slick Vizcaino SICU PA come to bedside who had been taking care of the patient prior to his SICU downgrade and this exam is similar to the he had exam yesterday but with the left side of abdomen softer than before. I flushed NG tube multiple times with @ 100cc of output. I got a KUB that was similar to the one performed on am 11/2, no large gastric bubble, NG tube post-pyloric, mild illeus, no large bowel distention (final results pending). Patient did state he was passing gas during the day, and most recent vital signs done while I was at bedside all wnl. Will discuss with am team

## 2021-11-03 NOTE — PROGRESS NOTE ADULT - SUBJECTIVE AND OBJECTIVE BOX
SUBJECTIVE/24 hour events:  Patient is a 61yMale presenting with SBO pod# 2 of ex-lap drainage of 3 mesenteric hematomas and abscess of abdominal wall. Patient downgraded from the SICU yesterday evening with no events, ng tube and painting in place, no pain issues. Patient to get HD today.     Vital Signs Last 24 Hrs  T(C): 36.4 (02 Nov 2021 23:00), Max: 36.9 (02 Nov 2021 08:00)  T(F): 97.6 (02 Nov 2021 23:00), Max: 98.4 (02 Nov 2021 08:00)  HR: 91 (02 Nov 2021 23:00) (76 - 91)  BP: 146/63 (02 Nov 2021 23:00) (146/63 - 146/63)  BP(mean): --  RR: 18 (02 Nov 2021 23:00) (8 - 18)  SpO2: 95% (02 Nov 2021 23:00) (89% - 100%)  Drug Dosing Weight  Height (cm): 172.7 (01 Nov 2021 00:00)  Weight (kg): 84.6 (01 Nov 2021 00:00)  BMI (kg/m2): 28.4 (01 Nov 2021 00:00)  BSA (m2): 1.98 (01 Nov 2021 00:00)  I&O's Detail    01 Nov 2021 07:01  -  02 Nov 2021 07:00  --------------------------------------------------------  IN:    IV PiggyBack: 100 mL    IV PiggyBack: 100 mL    IV PiggyBack: 400 mL    IV PiggyBack: 100 mL    multiple electrolytes Injection Type 1.: 1612.5 mL  Total IN: 2312.5 mL    OUT:    Indwelling Catheter - Urethral (mL): 1260 mL    Nasogastric/Oral tube (mL): 900 mL    Other (mL): 600 mL    VAC (Vacuum Assisted Closure) System (mL): 100 mL  Total OUT: 2860 mL    Total NET: -547.5 mL      02 Nov 2021 07:01  -  03 Nov 2021 01:01  --------------------------------------------------------  IN:    IV PiggyBack: 100 mL    IV PiggyBack: 50 mL    multiple electrolytes Injection Type 1.: 750 mL  Total IN: 900 mL    OUT:    Indwelling Catheter - Urethral (mL): 490 mL    Nasogastric/Oral tube (mL): 500 mL  Total OUT: 990 mL    Total NET: -90 mL        Allergies    penicillin (Other)    Intolerances                              7.4    x     )-----------( x        ( 02 Nov 2021 17:15 )             22.8   11-02    140  |  100  |  37.6<H>  ----------------------------<  99  3.8   |  26.0  |  1.42<H>    Ca    7.7<L>      02 Nov 2021 04:50  Phos  3.6     11-02  Mg     2.4     11-02    PT/INR - ( 01 Nov 2021 04:35 )   PT: 15.3 sec;   INR: 1.34 ratio         PTT - ( 01 Nov 2021 04:35 )  PTT:27.6 sec    ROS:    PHYSICAL EXAM:  Gen: Resting comfortably in bed  Neurological: Alert and oriented x3 without focal deficit  Pulmonary: CTAB with decreased breath sounds at the bases  Cardiovascular: S1S2  Gastrointestinal: Soft, tender to palpation across the abdomen R>L, non-distended, +NGT with bilious output, wound vac to midline surgical wound well seated   : Painting in place draining yellow urine          MEDICATIONS  (STANDING):  chlorhexidine 2% Cloths 1 Application(s) Topical daily  heparin   Injectable 5000 Unit(s) SubCutaneous every 8 hours  levETIRAcetam  IVPB 750 milliGRAM(s) IV Intermittent <User Schedule>  metoprolol tartrate Injectable 2.5 milliGRAM(s) IV Push every 6 hours  multiple electrolytes Injection Type 1 1000 milliLiter(s) (75 mL/Hr) IV Continuous <Continuous>  pantoprazole  Injectable 40 milliGRAM(s) IV Push daily  piperacillin/tazobactam IVPB.. 3.375 Gram(s) IV Intermittent every 12 hours  tamsulosin 0.4 milliGRAM(s) Oral at bedtime    MEDICATIONS  (PRN):  HYDROmorphone  Injectable 0.5 milliGRAM(s) IV Push every 3 hours PRN Moderate Pain (4 - 6)  HYDROmorphone  Injectable 0.5 milliGRAM(s) IV Push every 4 hours PRN break through pain  HYDROmorphone  Injectable 1 milliGRAM(s) IV Push every 3 hours PRN Severe Pain (7 - 10)      RADIOLOGY STUDIES:    CULTURES:

## 2021-11-03 NOTE — PROGRESS NOTE ADULT - ASSESSMENT
61y Male initially admitted for SBO. Underwent SBR on 10/26. RTOR on 10/31 for evacuation of mesenteric hematoma and drainage of anterior wall abscess. Moderate protein calorie malnutrition    Neuro: Pain control, delirium precautions, sleep hygiene. Continue Keppra    CV: Hemodynamic monitoring via a-line.     Pulm: Encourage incentive spirometry, OOB as tolerated. Titrate supplemental oxygen to maintain SpO2 92-99%     GI/Nutrition: KUB to r/o post pyloric NGT placement. Consider starting tube feedings vs oral diet today? on IV hydration     /Renal: Chapman for strict I&O, f/u am labs, HD as per nephro. Consider ToV today    ID: Zosyn    Endo: No active issues     Skin: Repositioning for DTI prevention while in bed    Heme/DVT Prophylaxis: SCDs, SQH

## 2021-11-04 LAB
-  AMPICILLIN: SIGNIFICANT CHANGE UP
-  AMPICILLIN: SIGNIFICANT CHANGE UP
-  TETRACYCLINE: SIGNIFICANT CHANGE UP
-  TETRACYCLINE: SIGNIFICANT CHANGE UP
-  VANCOMYCIN: SIGNIFICANT CHANGE UP
-  VANCOMYCIN: SIGNIFICANT CHANGE UP
ANION GAP SERPL CALC-SCNC: 15 MMOL/L — SIGNIFICANT CHANGE UP (ref 5–17)
APTT BLD: 36 SEC — HIGH (ref 27.5–35.5)
APTT BLD: 40.6 SEC — HIGH (ref 27.5–35.5)
BASOPHILS # BLD AUTO: 0.04 K/UL — SIGNIFICANT CHANGE UP (ref 0–0.2)
BASOPHILS NFR BLD AUTO: 0.2 % — SIGNIFICANT CHANGE UP (ref 0–2)
BUN SERPL-MCNC: 27.8 MG/DL — HIGH (ref 8–20)
CALCIUM SERPL-MCNC: 8.1 MG/DL — LOW (ref 8.6–10.2)
CHLORIDE SERPL-SCNC: 102 MMOL/L — SIGNIFICANT CHANGE UP (ref 98–107)
CO2 SERPL-SCNC: 28 MMOL/L — SIGNIFICANT CHANGE UP (ref 22–29)
CREAT SERPL-MCNC: 1.23 MG/DL — SIGNIFICANT CHANGE UP (ref 0.5–1.3)
CULTURE RESULTS: SIGNIFICANT CHANGE UP
CULTURE RESULTS: SIGNIFICANT CHANGE UP
EOSINOPHIL # BLD AUTO: 0.46 K/UL — SIGNIFICANT CHANGE UP (ref 0–0.5)
EOSINOPHIL NFR BLD AUTO: 2.3 % — SIGNIFICANT CHANGE UP (ref 0–6)
GLUCOSE SERPL-MCNC: 110 MG/DL — HIGH (ref 70–99)
HCT VFR BLD CALC: 25.9 % — LOW (ref 39–50)
HGB BLD-MCNC: 8.5 G/DL — LOW (ref 13–17)
IMM GRANULOCYTES NFR BLD AUTO: 1.4 % — SIGNIFICANT CHANGE UP (ref 0–1.5)
LYMPHOCYTES # BLD AUTO: 1.39 K/UL — SIGNIFICANT CHANGE UP (ref 1–3.3)
LYMPHOCYTES # BLD AUTO: 7 % — LOW (ref 13–44)
MAGNESIUM SERPL-MCNC: 2.5 MG/DL — SIGNIFICANT CHANGE UP (ref 1.6–2.6)
MCHC RBC-ENTMCNC: 29.1 PG — SIGNIFICANT CHANGE UP (ref 27–34)
MCHC RBC-ENTMCNC: 32.8 GM/DL — SIGNIFICANT CHANGE UP (ref 32–36)
MCV RBC AUTO: 88.7 FL — SIGNIFICANT CHANGE UP (ref 80–100)
METHOD TYPE: SIGNIFICANT CHANGE UP
METHOD TYPE: SIGNIFICANT CHANGE UP
MONOCYTES # BLD AUTO: 0.87 K/UL — SIGNIFICANT CHANGE UP (ref 0–0.9)
MONOCYTES NFR BLD AUTO: 4.4 % — SIGNIFICANT CHANGE UP (ref 2–14)
NEUTROPHILS # BLD AUTO: 16.88 K/UL — HIGH (ref 1.8–7.4)
NEUTROPHILS NFR BLD AUTO: 84.7 % — HIGH (ref 43–77)
ORGANISM # SPEC MICROSCOPIC CNT: SIGNIFICANT CHANGE UP
PHOSPHATE SERPL-MCNC: 2.4 MG/DL — SIGNIFICANT CHANGE UP (ref 2.4–4.7)
PLATELET # BLD AUTO: 386 K/UL — SIGNIFICANT CHANGE UP (ref 150–400)
POTASSIUM SERPL-MCNC: 3.5 MMOL/L — SIGNIFICANT CHANGE UP (ref 3.5–5.3)
POTASSIUM SERPL-SCNC: 3.5 MMOL/L — SIGNIFICANT CHANGE UP (ref 3.5–5.3)
RBC # BLD: 2.92 M/UL — LOW (ref 4.2–5.8)
RBC # FLD: 15.9 % — HIGH (ref 10.3–14.5)
SODIUM SERPL-SCNC: 145 MMOL/L — SIGNIFICANT CHANGE UP (ref 135–145)
SPECIMEN SOURCE: SIGNIFICANT CHANGE UP
SPECIMEN SOURCE: SIGNIFICANT CHANGE UP
WBC # BLD: 19.92 K/UL — HIGH (ref 3.8–10.5)
WBC # FLD AUTO: 19.92 K/UL — HIGH (ref 3.8–10.5)

## 2021-11-04 PROCEDURE — 99233 SBSQ HOSP IP/OBS HIGH 50: CPT

## 2021-11-04 PROCEDURE — 99024 POSTOP FOLLOW-UP VISIT: CPT

## 2021-11-04 RX ORDER — HYDROMORPHONE HYDROCHLORIDE 2 MG/ML
1 INJECTION INTRAMUSCULAR; INTRAVENOUS; SUBCUTANEOUS ONCE
Refills: 0 | Status: DISCONTINUED | OUTPATIENT
Start: 2021-11-04 | End: 2021-11-04

## 2021-11-04 RX ORDER — HEPARIN SODIUM 5000 [USP'U]/ML
1100 INJECTION INTRAVENOUS; SUBCUTANEOUS
Qty: 25000 | Refills: 0 | Status: DISCONTINUED | OUTPATIENT
Start: 2021-11-04 | End: 2021-11-05

## 2021-11-04 RX ORDER — ERYTHROPOIETIN 10000 [IU]/ML
10000 INJECTION, SOLUTION INTRAVENOUS; SUBCUTANEOUS
Refills: 0 | Status: DISCONTINUED | OUTPATIENT
Start: 2021-11-04 | End: 2021-11-10

## 2021-11-04 RX ORDER — METOPROLOL TARTRATE 50 MG
5 TABLET ORAL EVERY 6 HOURS
Refills: 0 | Status: DISCONTINUED | OUTPATIENT
Start: 2021-11-04 | End: 2021-11-07

## 2021-11-04 RX ADMIN — HYDROMORPHONE HYDROCHLORIDE 1 MILLIGRAM(S): 2 INJECTION INTRAMUSCULAR; INTRAVENOUS; SUBCUTANEOUS at 10:57

## 2021-11-04 RX ADMIN — PANTOPRAZOLE SODIUM 40 MILLIGRAM(S): 20 TABLET, DELAYED RELEASE ORAL at 11:52

## 2021-11-04 RX ADMIN — Medication 2.5 MILLIGRAM(S): at 04:49

## 2021-11-04 RX ADMIN — HYDROMORPHONE HYDROCHLORIDE 1 MILLIGRAM(S): 2 INJECTION INTRAMUSCULAR; INTRAVENOUS; SUBCUTANEOUS at 08:03

## 2021-11-04 RX ADMIN — Medication 5 MILLIGRAM(S): at 17:37

## 2021-11-04 RX ADMIN — HYDROMORPHONE HYDROCHLORIDE 0.5 MILLIGRAM(S): 2 INJECTION INTRAMUSCULAR; INTRAVENOUS; SUBCUTANEOUS at 21:39

## 2021-11-04 RX ADMIN — PIPERACILLIN AND TAZOBACTAM 25 GRAM(S): 4; .5 INJECTION, POWDER, LYOPHILIZED, FOR SOLUTION INTRAVENOUS at 11:52

## 2021-11-04 RX ADMIN — HYDROMORPHONE HYDROCHLORIDE 1 MILLIGRAM(S): 2 INJECTION INTRAMUSCULAR; INTRAVENOUS; SUBCUTANEOUS at 02:00

## 2021-11-04 RX ADMIN — HEPARIN SODIUM 10 UNIT(S)/HR: 5000 INJECTION INTRAVENOUS; SUBCUTANEOUS at 08:16

## 2021-11-04 RX ADMIN — Medication 5 MILLIGRAM(S): at 11:52

## 2021-11-04 RX ADMIN — HYDROMORPHONE HYDROCHLORIDE 0.5 MILLIGRAM(S): 2 INJECTION INTRAMUSCULAR; INTRAVENOUS; SUBCUTANEOUS at 12:37

## 2021-11-04 RX ADMIN — HYDROMORPHONE HYDROCHLORIDE 1 MILLIGRAM(S): 2 INJECTION INTRAMUSCULAR; INTRAVENOUS; SUBCUTANEOUS at 01:33

## 2021-11-04 RX ADMIN — HEPARIN SODIUM 11 UNIT(S)/HR: 5000 INJECTION INTRAVENOUS; SUBCUTANEOUS at 11:51

## 2021-11-04 RX ADMIN — HYDROMORPHONE HYDROCHLORIDE 1 MILLIGRAM(S): 2 INJECTION INTRAMUSCULAR; INTRAVENOUS; SUBCUTANEOUS at 08:30

## 2021-11-04 RX ADMIN — Medication 2.5 MILLIGRAM(S): at 00:22

## 2021-11-04 RX ADMIN — LEVETIRACETAM 400 MILLIGRAM(S): 250 TABLET, FILM COATED ORAL at 17:37

## 2021-11-04 RX ADMIN — TAMSULOSIN HYDROCHLORIDE 0.4 MILLIGRAM(S): 0.4 CAPSULE ORAL at 21:38

## 2021-11-04 RX ADMIN — PIPERACILLIN AND TAZOBACTAM 25 GRAM(S): 4; .5 INJECTION, POWDER, LYOPHILIZED, FOR SOLUTION INTRAVENOUS at 21:38

## 2021-11-04 RX ADMIN — HYDROMORPHONE HYDROCHLORIDE 1 MILLIGRAM(S): 2 INJECTION INTRAMUSCULAR; INTRAVENOUS; SUBCUTANEOUS at 05:00

## 2021-11-04 RX ADMIN — HEPARIN SODIUM 12 UNIT(S)/HR: 5000 INJECTION INTRAVENOUS; SUBCUTANEOUS at 21:34

## 2021-11-04 RX ADMIN — HYDROMORPHONE HYDROCHLORIDE 0.5 MILLIGRAM(S): 2 INJECTION INTRAMUSCULAR; INTRAVENOUS; SUBCUTANEOUS at 22:47

## 2021-11-04 RX ADMIN — HYDROMORPHONE HYDROCHLORIDE 0.5 MILLIGRAM(S): 2 INJECTION INTRAMUSCULAR; INTRAVENOUS; SUBCUTANEOUS at 13:00

## 2021-11-04 RX ADMIN — HYDROMORPHONE HYDROCHLORIDE 1 MILLIGRAM(S): 2 INJECTION INTRAMUSCULAR; INTRAVENOUS; SUBCUTANEOUS at 04:55

## 2021-11-04 RX ADMIN — HYDROMORPHONE HYDROCHLORIDE 1 MILLIGRAM(S): 2 INJECTION INTRAMUSCULAR; INTRAVENOUS; SUBCUTANEOUS at 10:27

## 2021-11-04 NOTE — OCCUPATIONAL THERAPY INITIAL EVALUATION ADULT - LEVEL OF INDEPENDENCE: DRESS LOWER BODY, OT EVAL
to don socks. Pt limited by pain, unable to participate in task/dependent (less than 25% patients effort)

## 2021-11-04 NOTE — PROGRESS NOTE ADULT - SUBJECTIVE AND OBJECTIVE BOX
INTERVAL HPI/OVERNIGHT EVENTS: Patient is a 61yMale presenting with SBO pod# 3 of ex-lap drainage of 3 mesenteric hematomas and abscess of abdominal wall. No events. Denying cp, sob, n/v, f/c. Ng tube., no pain issues. Pt failed voiding trail earlier today and required replacement of painting.    MEDICATIONS  (STANDING):  heparin  Infusion 1000 Unit(s)/Hr (10 mL/Hr) IV Continuous <Continuous>  levETIRAcetam  IVPB 750 milliGRAM(s) IV Intermittent <User Schedule>  metoprolol tartrate Injectable 2.5 milliGRAM(s) IV Push every 6 hours  pantoprazole  Injectable 40 milliGRAM(s) IV Push daily  piperacillin/tazobactam IVPB.. 3.375 Gram(s) IV Intermittent every 12 hours  tamsulosin 0.4 milliGRAM(s) Oral at bedtime    MEDICATIONS  (PRN):  HYDROmorphone  Injectable 0.5 milliGRAM(s) IV Push every 3 hours PRN Moderate Pain (4 - 6)  HYDROmorphone  Injectable 0.5 milliGRAM(s) IV Push every 4 hours PRN break through pain  HYDROmorphone  Injectable 1 milliGRAM(s) IV Push every 3 hours PRN Severe Pain (7 - 10)      Vital Signs Last 24 Hrs  T(C): 36.7 (03 Nov 2021 21:30), Max: 36.7 (03 Nov 2021 21:30)  T(F): 98.1 (03 Nov 2021 21:30), Max: 98.1 (03 Nov 2021 21:30)  HR: 85 (04 Nov 2021 00:32) (81 - 96)  BP: 157/65 (04 Nov 2021 00:32) (143/60 - 179/71)  BP(mean): --  RR: 18 (03 Nov 2021 21:30) (18 - 19)  SpO2: 91% (03 Nov 2021 21:30) (91% - 99%)    Physical Exam:  Gen: Resting comfortably in bed  Neurological: Alert and oriented x3 without focal deficit  Pulmonary: CTAB with decreased breath sounds at the bases  Cardiovascular: S1S2  Gastrointestinal: Soft, tender to palpation across the abdomen R>L, non-distended, +NGT with bilious output, wound vac to midline surgical wound well seated   : Painting in place draining yellow urine    I&O's Detail    02 Nov 2021 07:01  -  03 Nov 2021 07:00  --------------------------------------------------------  IN:    IV PiggyBack: 100 mL    IV PiggyBack: 50 mL    multiple electrolytes Injection Type 1.: 750 mL  Total IN: 900 mL    OUT:    Indwelling Catheter - Urethral (mL): 840 mL    Nasogastric/Oral tube (mL): 750 mL  Total OUT: 1590 mL    Total NET: -690 mL      03 Nov 2021 07:01  -  04 Nov 2021 01:33  --------------------------------------------------------  IN:  Total IN: 0 mL    OUT:    Other (mL): 0 mL  Total OUT: 0 mL    Total NET: 0 mL          LABS:                        8.4    24.96 )-----------( 357      ( 03 Nov 2021 21:43 )             25.6     11-03    143  |  100  |  39.3<H>  ----------------------------<  94  3.6   |  29.0  |  1.36<H>    Ca    7.7<L>      03 Nov 2021 07:19  Phos  3.1     11-03  Mg     2.7     11-03      PTT - ( 03 Nov 2021 21:43 )  PTT:44.3 sec      RADIOLOGY & ADDITIONAL STUDIES:

## 2021-11-04 NOTE — OCCUPATIONAL THERAPY INITIAL EVALUATION ADULT - FINE MOTOR COORDINATION, RIGHT HAND THUMB/FINGER OPPOSITION SKILLS, OT EVAL
normal performance No Residual Tumor Seen Histology Text: There were no malignant cells seen in the sections examined.

## 2021-11-04 NOTE — PHYSICAL THERAPY INITIAL EVALUATION ADULT - CRITERIA FOR SKILLED THERAPEUTIC INTERVENTIONS
impairments found
impairments found/functional limitations in following categories/risk reduction/prevention/rehab potential/therapy frequency/anticipated discharge recommendation

## 2021-11-04 NOTE — OCCUPATIONAL THERAPY INITIAL EVALUATION ADULT - ADDITIONAL COMMENTS
Pt has a tub with multiple grab bars. pt independent with all mobility without devices PTA. Pt owns cane, RW, shower chair, commode, reacher, shoe horn and sock aide from prior CVA and cardiac surgery. Pt is right handed and drives.

## 2021-11-04 NOTE — PHYSICAL THERAPY INITIAL EVALUATION ADULT - ASSISTIVE DEVICE FOR TRANSFER: SIT/STAND, REHAB EVAL
"Physical Therapy  Treatment    Atif Neves   MRN: [id number]   Admitting Diagnosis: Delirium, acute    PT Received On: 17  PT Start Time: 1032     PT Stop Time: 1056    PT Total Time (min): 24 min       Billable Minutes:  Gait Training 12 and Therapeutic Activity 12    Treatment Type: Treatment  PT/PTA: PTA     PTA Visit Number: 1       General Precautions: Standard, fall (delirium )  Orthopedic Precautions: N/A   Braces:      Do you have any cultural, spiritual, Mormon conflicts, given your current situation?: none stated    Subjective:  Communicated with NSG prior to session.  Patient states " I am very weak and tired"    Pain Ratin/10              Pain Rating Post-Intervention: 0/10    Objective:   Patient found with: bed alarm, peripheral IV, telemetry    Functional Mobility:  Bed Mobility:   Rolling/Turning Right: Minimum assistance  Scooting/Bridging: Total Assistance  Supine to Sit: Moderate Assistance, With side rail  Sit to Supine: Moderate Assistance, With leg lift    Transfers:  Sit <> Stand Assistance: Moderate Assistance  Sit <> Stand Assistive Device: Rolling Walker    Gait:   Gait Distance: 2 steps fwd/bwd x 3 trials and 5 side steps to HOB with max. cues  Assistance 1: Maximum assistance  Gait Assistive Device: Rolling walker  Gait Pattern: 3-point gait  Gait Deviation(s): decreased alisia, increased time in double stance, decreased velocity of limb motion, decreased step length, decreased stride length, decreased swing-to-stance ratio, decreased toe-to-floor clearance, decreased weight-shifting ability, lateral lean, forward lean    Stairs:      Balance:   Static Sit: POOR+: Needs MINIMAL assist to maintain  Dynamic Sit: POOR: N/A  Static Stand: POOR: Needs MODERATE assist to maintain  Dynamic stand: POOR: N/A     Therapeutic Activities and Exercises:  Static sitting balance at EOB. Donned/Doffed a gown. Static sitting balance at EOB x 15 minutes with min assist. Static standing balance "
using RW for support 3x30 secs with mod assist. B LE AAROM/PROM x 25 reps on all available planes of motion.      AM-PAC 6 CLICK MOBILITY  How much help from another person does this patient currently need?   1 = Unable, Total/Dependent Assistance  2 = A lot, Maximum/Moderate Assistance  3 = A little, Minimum/Contact Guard/Supervision  4 = None, Modified [place]/Independent    Turning over in bed (including adjusting bedclothes, sheets and blankets)?: 3  Sitting down on and standing up from a chair with arms (e.g., wheelchair, bedside commode, etc.): 2  Moving from lying on back to sitting on the side of the bed?: 2  Moving to and from a bed to a chair (including a wheelchair)?: 2  Need to walk in hospital room?: 1  Climbing 3-5 steps with a railing?: 1  Total Score: 11    AM-PAC Raw Score CMS G-Code Modifier Level of Impairment Assistance   6 % Total / Unable   7 - 9 CM 80 - 100% Maximal Assist   10 - 14 CL 60 - 80% Moderate Assist   15 - 19 CK 40 - 60% Moderate Assist   20 - 22 CJ 20 - 40% Minimal Assist   23 CI 1-20% SBA / CGA   24 CH 0% Independent/ Mod I     Patient left HOB elevated with all lines intact, call button in reach and bed alarm on.    Assessment:  Atif Neves is a 89 y.o. male with a medical diagnosis of Delirium, acute and presents with decreased functional mobility. Patient was incoherent most of the time, but at times was able to express how He felt. Patient with a tendency to lean to the left and fwd in sitting and standing, with limited ability to follow motor commands which made it difficult when attempting to ambulate. Patient would benefit from continued P.T. To address deficits.    Rehab identified problem list/impairments: Rehab identified problem list/impairments: weakness, impaired endurance, impaired self care skills, impaired functional mobilty, gait instability, impaired balance, impaired cognition, decreased safety awareness, abnormal tone, impaired fine motor, 
impaired cardiopulmonary response to activity    Rehab potential is fair.    Activity tolerance: Fair    Discharge recommendations: Discharge Facility/Level Of Care Needs: nursing facility, skilled     Barriers to discharge: Barriers to Discharge: Decreased caregiver support    Equipment recommendations: Equipment Needed After Discharge:  (TBD at next level of care.)     GOALS:   Physical Therapy Goals        Problem: Physical Therapy Goal    Goal Priority Disciplines Outcome Goal Variances Interventions   Physical Therapy Goal     PT/OT, PT Ongoing (interventions implemented as appropriate)     Description:  Goals to be met by: 4/3/17     Patient will increase functional independence with mobility by performin. Supine to sit with CGA  2. Sit to supine with CGA  3. Sit to stand transfer with SBA  4. Bed to chair transfer with Stand-by Assistance using Rolling Walker  5. Gait  x 75 feet with Contact Guard Assistance using Rolling Walker.   6. Ascend/Descend 7 inch curb step with Minimal Assistance using Rolling Walker.  7. Lower extremity exercise program x20-30 reps per handout, with independence                 PLAN:    Patient to be seen 4 x/week  to address the above listed problems via gait training, therapeutic activities, therapeutic exercises, neuromuscular re-education  Plan of Care expires: 17  Plan of Care reviewed with: patient         Jonatan  Rodriguez, PTA  2017    
no device
rolling walker

## 2021-11-04 NOTE — PROGRESS NOTE ADULT - ASSESSMENT
61y Male initially admitted for SBO. Underwent SBR on 10/26. RTOR on 10/31 for evacuation of mesenteric hematoma and drainage of anterior wall abscess. Moderate protein calorie malnutrition    Neuro: Pain control, delirium precautions, sleep hygiene. Continue Keppra    Pulm: Encourage incentive spirometry, OOB as tolerated. Titrate supplemental oxygen to maintain SpO2 92-99%     GI/Nutrition: KUB confirmed proper NGT placement. Consider starting tube feedings vs oral diet today? on IV hydration     /Renal: Chapman for strict I&O, f/u am labs, HD as per nephro. ToV failed 11/4    ID: Zosyn    Endo: No active issues     Skin: Repositioning for DTI prevention while in bed    Heme: Hb from 7>8    Heme/DVT Prophylaxis: SCDs, SQH

## 2021-11-04 NOTE — OCCUPATIONAL THERAPY INITIAL EVALUATION ADULT - GENERAL OBSERVATIONS, REHAB EVAL
Received pt in bedside recliner, NAD, +IV, +woundvac, +Chapman, +on RA, A&Ox4. Patient agreeable to OT evaluation

## 2021-11-04 NOTE — PHYSICAL THERAPY INITIAL EVALUATION ADULT - GENERAL OBSERVATIONS, REHAB EVAL
awake in bed on room air, +telemonitor, +NGT, +painting
Pt received in bed supine, + IV, wound vac, painting.

## 2021-11-04 NOTE — OCCUPATIONAL THERAPY INITIAL EVALUATION ADULT - SPECIAL TRAINING, OT EVAL
Pt ambulated with RW and min A x 1 +additional assist for line management from bedside chair to bed  due to decreased balance and strength. Pt limited by increased pain. Pt required cues for sequencing of movement including proper foot placement, use of RW and body position in relation to RW. Pt educated in energy conservation techniques including proper breathing and activity pacing.

## 2021-11-04 NOTE — PHYSICAL THERAPY INITIAL EVALUATION ADULT - MANUAL MUSCLE TESTING RESULTS, REHAB EVAL
b/l UE/LE grossly 4/5
bilateral shoulder flex, elbow flex WFL; right hip flex 4+/5, knee ext 4+/5, ankle df 4+/5; left hip flex 4/5, knee ext 4/5, ankle df 4+/5

## 2021-11-04 NOTE — OCCUPATIONAL THERAPY INITIAL EVALUATION ADULT - LEVEL OF INDEPENDENCE: DRESS UPPER BODY, OT EVAL
to don gown. In semifowler pt min A x1. Pt limited by pain and ability to tolerate sitting upright/standing upright during session./moderate assist (50% patients effort)

## 2021-11-04 NOTE — OCCUPATIONAL THERAPY INITIAL EVALUATION ADULT - VISUAL ACUITY
No complaints or changes in vision offered. Pt able to correctly identify # of digits held in central and peripheral fields bilaterally

## 2021-11-04 NOTE — PHYSICAL THERAPY INITIAL EVALUATION ADULT - ADDITIONAL COMMENTS
pt states he lives alone in a 1-story house with 8 steps to enter(+bilateral rails). pt independent with all mobility without devices prior to admit. has cane and RW from prior CVA and cardiac surgery.
pt states he lives alone in a 1-story house with 8 steps to enter(+bilateral rails). pt independent with all mobility without devices prior to admit. has cane and RW from prior CVA and cardiac surgery.

## 2021-11-04 NOTE — PHYSICAL THERAPY INITIAL EVALUATION ADULT - PLANNED THERAPY INTERVENTIONS, PT EVAL
stairs/balance training/bed mobility training/gait training/strengthening/transfer training
balance training/bed mobility training/gait training/strengthening/transfer training

## 2021-11-04 NOTE — OCCUPATIONAL THERAPY INITIAL EVALUATION ADULT - SENSORY TESTS
Pt states chronic numbness and tingling in Left UE/LE from prior CVA. No complaints/changes in sensation offered.

## 2021-11-04 NOTE — OCCUPATIONAL THERAPY INITIAL EVALUATION ADULT - PERTINENT HX OF CURRENT PROBLEM, REHAB EVAL
PMH of ESRD, MI, CVA, aortic aneurysm with aortic aneurysm repair c/b midline abdominal wound dehiscence requiring partial colectomy and ostomy with eventual reversal presenting to ED with SBO.

## 2021-11-04 NOTE — OCCUPATIONAL THERAPY INITIAL EVALUATION ADULT - PLANNED THERAPY INTERVENTIONS, OT EVAL
ADL retraining/IADL retraining/balance training/bed mobility training/motor coordination training/neuromuscular re-education/ROM/strengthening/transfer training

## 2021-11-04 NOTE — OCCUPATIONAL THERAPY INITIAL EVALUATION ADULT - DIAGNOSIS, OT EVAL
61 year old Male initially admitted for SBO underwent SBR 10/26 RTOR on 10/31 for evacuation of mesenteric hematoma and drainage of anterior wall abscess

## 2021-11-04 NOTE — PHYSICAL THERAPY INITIAL EVALUATION ADULT - PERTINENT HX OF CURRENT PROBLEM, REHAB EVAL
Patient is a 61y old male with PMH of ESRD, MI, CVA, aortic aneurysm with aortic aneurysm repair c/b midline abdominal wound dehiscence requiring partial colectomy and ostomy with eventual reversal presenting to ED with SBO.
Patient is a 61y old male with PMH of ESRD, MI, CVA, aortic aneurysm with aortic aneurysm repair c/b midline abdominal wound dehiscence requiring partial colectomy and ostomy with eventual reversal presenting to ED with SBO.

## 2021-11-04 NOTE — OCCUPATIONAL THERAPY INITIAL EVALUATION ADULT - LIVES WITH, PROFILE
pt states he lives alone in a 1-story house with 2 VESTA with bilateral hand rails and 8 steps inside with bilateral hand rails to bed/bath. Pt states has family/friends in area who can assist if needed./alone

## 2021-11-05 LAB
ANION GAP SERPL CALC-SCNC: 13 MMOL/L — SIGNIFICANT CHANGE UP (ref 5–17)
APTT BLD: 36.4 SEC — HIGH (ref 27.5–35.5)
APTT BLD: 42.2 SEC — HIGH (ref 27.5–35.5)
APTT BLD: 46.7 SEC — HIGH (ref 27.5–35.5)
BASOPHILS # BLD AUTO: 0.03 K/UL — SIGNIFICANT CHANGE UP (ref 0–0.2)
BASOPHILS NFR BLD AUTO: 0.2 % — SIGNIFICANT CHANGE UP (ref 0–2)
BODY SURFACE AREA CALCULATION: 1.73 M2 — SIGNIFICANT CHANGE UP
BUN SERPL-MCNC: 31.6 MG/DL — HIGH (ref 8–20)
CALCIUM SERPL-MCNC: 7.6 MG/DL — LOW (ref 8.6–10.2)
CHLORIDE SERPL-SCNC: 97 MMOL/L — LOW (ref 98–107)
CO2 SERPL-SCNC: 27 MMOL/L — SIGNIFICANT CHANGE UP (ref 22–29)
COLLECT DURATION TIME UR: 24 HR — SIGNIFICANT CHANGE UP
COLLECT DURATION TIME UR: 24 HR — SIGNIFICANT CHANGE UP
CREAT CL ?TM UR+SERPL-VRATE: 56 ML/MIN — LOW (ref 85–125)
CREAT SERPL-MCNC: 1.33 MG/DL — HIGH (ref 0.5–1.3)
EOSINOPHIL # BLD AUTO: 0.39 K/UL — SIGNIFICANT CHANGE UP (ref 0–0.5)
EOSINOPHIL NFR BLD AUTO: 2.4 % — SIGNIFICANT CHANGE UP (ref 0–6)
GLUCOSE SERPL-MCNC: 136 MG/DL — HIGH (ref 70–99)
HCT VFR BLD CALC: 25.2 % — LOW (ref 39–50)
HGB BLD-MCNC: 8.4 G/DL — LOW (ref 13–17)
IMM GRANULOCYTES NFR BLD AUTO: 1.5 % — SIGNIFICANT CHANGE UP (ref 0–1.5)
LYMPHOCYTES # BLD AUTO: 1.21 K/UL — SIGNIFICANT CHANGE UP (ref 1–3.3)
LYMPHOCYTES # BLD AUTO: 7.4 % — LOW (ref 13–44)
MAGNESIUM SERPL-MCNC: 2.3 MG/DL — SIGNIFICANT CHANGE UP (ref 1.6–2.6)
MCHC RBC-ENTMCNC: 29.9 PG — SIGNIFICANT CHANGE UP (ref 27–34)
MCHC RBC-ENTMCNC: 33.3 GM/DL — SIGNIFICANT CHANGE UP (ref 32–36)
MCV RBC AUTO: 89.7 FL — SIGNIFICANT CHANGE UP (ref 80–100)
MONOCYTES # BLD AUTO: 0.85 K/UL — SIGNIFICANT CHANGE UP (ref 0–0.9)
MONOCYTES NFR BLD AUTO: 5.2 % — SIGNIFICANT CHANGE UP (ref 2–14)
NEUTROPHILS # BLD AUTO: 13.72 K/UL — HIGH (ref 1.8–7.4)
NEUTROPHILS NFR BLD AUTO: 83.3 % — HIGH (ref 43–77)
PHOSPHATE SERPL-MCNC: 2.3 MG/DL — LOW (ref 2.4–4.7)
PLATELET # BLD AUTO: 357 K/UL — SIGNIFICANT CHANGE UP (ref 150–400)
POTASSIUM SERPL-MCNC: 3.2 MMOL/L — LOW (ref 3.5–5.3)
POTASSIUM SERPL-SCNC: 3.2 MMOL/L — LOW (ref 3.5–5.3)
RBC # BLD: 2.81 M/UL — LOW (ref 4.2–5.8)
RBC # FLD: 15.6 % — HIGH (ref 10.3–14.5)
SARS-COV-2 RNA SPEC QL NAA+PROBE: SIGNIFICANT CHANGE UP
SODIUM SERPL-SCNC: 137 MMOL/L — SIGNIFICANT CHANGE UP (ref 135–145)
SURGICAL PATHOLOGY STUDY: SIGNIFICANT CHANGE UP
TOTAL VOLUME - 24 HOUR: 1500 ML — SIGNIFICANT CHANGE UP
TOTAL VOLUME - 24 HOUR: 1500 ML — SIGNIFICANT CHANGE UP
URINE CREATININE CALCULATION: 1.1 G/24 HR — SIGNIFICANT CHANGE UP (ref 1–2)
WBC # BLD: 16.45 K/UL — HIGH (ref 3.8–10.5)
WBC # FLD AUTO: 16.45 K/UL — HIGH (ref 3.8–10.5)

## 2021-11-05 PROCEDURE — 99233 SBSQ HOSP IP/OBS HIGH 50: CPT

## 2021-11-05 PROCEDURE — 99024 POSTOP FOLLOW-UP VISIT: CPT

## 2021-11-05 PROCEDURE — 74018 RADEX ABDOMEN 1 VIEW: CPT | Mod: 26

## 2021-11-05 RX ORDER — POTASSIUM CHLORIDE 20 MEQ
10 PACKET (EA) ORAL
Refills: 0 | Status: COMPLETED | OUTPATIENT
Start: 2021-11-05 | End: 2021-11-05

## 2021-11-05 RX ORDER — HEPARIN SODIUM 5000 [USP'U]/ML
1300 INJECTION INTRAVENOUS; SUBCUTANEOUS
Qty: 25000 | Refills: 0 | Status: DISCONTINUED | OUTPATIENT
Start: 2021-11-05 | End: 2021-11-06

## 2021-11-05 RX ADMIN — HYDROMORPHONE HYDROCHLORIDE 1 MILLIGRAM(S): 2 INJECTION INTRAMUSCULAR; INTRAVENOUS; SUBCUTANEOUS at 17:49

## 2021-11-05 RX ADMIN — HYDROMORPHONE HYDROCHLORIDE 0.5 MILLIGRAM(S): 2 INJECTION INTRAMUSCULAR; INTRAVENOUS; SUBCUTANEOUS at 11:52

## 2021-11-05 RX ADMIN — Medication 5 MILLIGRAM(S): at 00:07

## 2021-11-05 RX ADMIN — PIPERACILLIN AND TAZOBACTAM 25 GRAM(S): 4; .5 INJECTION, POWDER, LYOPHILIZED, FOR SOLUTION INTRAVENOUS at 11:16

## 2021-11-05 RX ADMIN — HEPARIN SODIUM 13 UNIT(S)/HR: 5000 INJECTION INTRAVENOUS; SUBCUTANEOUS at 19:28

## 2021-11-05 RX ADMIN — HYDROMORPHONE HYDROCHLORIDE 0.5 MILLIGRAM(S): 2 INJECTION INTRAMUSCULAR; INTRAVENOUS; SUBCUTANEOUS at 01:34

## 2021-11-05 RX ADMIN — HYDROMORPHONE HYDROCHLORIDE 0.5 MILLIGRAM(S): 2 INJECTION INTRAMUSCULAR; INTRAVENOUS; SUBCUTANEOUS at 02:47

## 2021-11-05 RX ADMIN — HYDROMORPHONE HYDROCHLORIDE 0.5 MILLIGRAM(S): 2 INJECTION INTRAMUSCULAR; INTRAVENOUS; SUBCUTANEOUS at 12:10

## 2021-11-05 RX ADMIN — LEVETIRACETAM 400 MILLIGRAM(S): 250 TABLET, FILM COATED ORAL at 17:49

## 2021-11-05 RX ADMIN — HYDROMORPHONE HYDROCHLORIDE 1 MILLIGRAM(S): 2 INJECTION INTRAMUSCULAR; INTRAVENOUS; SUBCUTANEOUS at 09:45

## 2021-11-05 RX ADMIN — Medication 5 MILLIGRAM(S): at 17:48

## 2021-11-05 RX ADMIN — HYDROMORPHONE HYDROCHLORIDE 1 MILLIGRAM(S): 2 INJECTION INTRAMUSCULAR; INTRAVENOUS; SUBCUTANEOUS at 14:12

## 2021-11-05 RX ADMIN — PANTOPRAZOLE SODIUM 40 MILLIGRAM(S): 20 TABLET, DELAYED RELEASE ORAL at 11:52

## 2021-11-05 RX ADMIN — HYDROMORPHONE HYDROCHLORIDE 0.5 MILLIGRAM(S): 2 INJECTION INTRAMUSCULAR; INTRAVENOUS; SUBCUTANEOUS at 07:54

## 2021-11-05 RX ADMIN — HYDROMORPHONE HYDROCHLORIDE 1 MILLIGRAM(S): 2 INJECTION INTRAMUSCULAR; INTRAVENOUS; SUBCUTANEOUS at 18:05

## 2021-11-05 RX ADMIN — HYDROMORPHONE HYDROCHLORIDE 0.5 MILLIGRAM(S): 2 INJECTION INTRAMUSCULAR; INTRAVENOUS; SUBCUTANEOUS at 05:18

## 2021-11-05 RX ADMIN — Medication 100 MILLIEQUIVALENT(S): at 08:33

## 2021-11-05 RX ADMIN — HYDROMORPHONE HYDROCHLORIDE 1 MILLIGRAM(S): 2 INJECTION INTRAMUSCULAR; INTRAVENOUS; SUBCUTANEOUS at 21:38

## 2021-11-05 RX ADMIN — Medication 100 MILLIEQUIVALENT(S): at 11:17

## 2021-11-05 RX ADMIN — HYDROMORPHONE HYDROCHLORIDE 0.5 MILLIGRAM(S): 2 INJECTION INTRAMUSCULAR; INTRAVENOUS; SUBCUTANEOUS at 05:47

## 2021-11-05 RX ADMIN — HYDROMORPHONE HYDROCHLORIDE 1 MILLIGRAM(S): 2 INJECTION INTRAMUSCULAR; INTRAVENOUS; SUBCUTANEOUS at 14:30

## 2021-11-05 RX ADMIN — Medication 100 MILLIEQUIVALENT(S): at 09:51

## 2021-11-05 RX ADMIN — TAMSULOSIN HYDROCHLORIDE 0.4 MILLIGRAM(S): 0.4 CAPSULE ORAL at 21:37

## 2021-11-05 RX ADMIN — ERYTHROPOIETIN 10000 UNIT(S): 10000 INJECTION, SOLUTION INTRAVENOUS; SUBCUTANEOUS at 15:27

## 2021-11-05 RX ADMIN — HYDROMORPHONE HYDROCHLORIDE 1 MILLIGRAM(S): 2 INJECTION INTRAMUSCULAR; INTRAVENOUS; SUBCUTANEOUS at 09:28

## 2021-11-05 RX ADMIN — Medication 5 MILLIGRAM(S): at 05:18

## 2021-11-05 RX ADMIN — HEPARIN SODIUM 13 UNIT(S)/HR: 5000 INJECTION INTRAVENOUS; SUBCUTANEOUS at 15:06

## 2021-11-05 RX ADMIN — HEPARIN SODIUM 13 UNIT(S)/HR: 5000 INJECTION INTRAVENOUS; SUBCUTANEOUS at 04:06

## 2021-11-05 RX ADMIN — Medication 5 MILLIGRAM(S): at 11:54

## 2021-11-05 NOTE — PROGRESS NOTE ADULT - ASSESSMENT
1) ESRD on HD  2) MBD of renal dx  3) Anemia of renal dx  4) Vol HTN    HD on hold  On epogen  Watch off dialysis

## 2021-11-05 NOTE — PROGRESS NOTE ADULT - ASSESSMENT
61y Male initially admitted for SBO. Underwent SBR on 10/26. RTOR on 10/31 for evacuation of mesenteric hematoma and drainage of anterior wall abscess. Moderate protein calorie malnutrition.    Neuro: Pain control, delirium precautions, sleep hygiene. Continue Keppra  Pulm: Encourage incentive spirometry, OOB as tolerated. Titrate supplemental oxygen to maintain SpO2 92-99%   GI/Nutrition: NG tube out. Tolerating clears  /Renal: Chapman for strict I&O, f/u am labs, HD as per nephro. ToV failed 11/4  ID: Zosyn  Skin: Repositioning for DTI prevention while in bed  Heme: Hb from 8.4>8.5  Heme/DVT Prophylaxis: SCDs, Hep gtt (last PTT was 36)

## 2021-11-05 NOTE — PROGRESS NOTE ADULT - SUBJECTIVE AND OBJECTIVE BOX
[facility] DIVISION OF KIDNEY DISEASES AND HYPERTENSION -- FOLLOW UP NOTE  --------------------------------------------------------------------------------  Chief Complaint:  SANTOS  24 hour events/subjective:  Seen/examined;  SCr improving off HD      PAST HISTORY  --------------------------------------------------------------------------------  No significant changes to PMH, PSH, FHx, SHx, unless otherwise noted    ALLERGIES & MEDICATIONS  --------------------------------------------------------------------------------  Allergies    penicillin (Other)    Intolerances      Standing Inpatient Medications  epoetin yolanda-epbx (RETACRIT) Injectable 42175 Unit(s) SubCutaneous <User Schedule>  heparin  Infusion 1300 Unit(s)/Hr IV Continuous <Continuous>  levETIRAcetam  IVPB 750 milliGRAM(s) IV Intermittent <User Schedule>  metoprolol tartrate Injectable 5 milliGRAM(s) IV Push every 6 hours  pantoprazole  Injectable 40 milliGRAM(s) IV Push daily  piperacillin/tazobactam IVPB.. 3.375 Gram(s) IV Intermittent every 12 hours  tamsulosin 0.4 milliGRAM(s) Oral at bedtime    PRN Inpatient Medications  HYDROmorphone  Injectable 1 milliGRAM(s) IV Push every 3 hours PRN  HYDROmorphone  Injectable 0.5 milliGRAM(s) IV Push every 4 hours PRN  HYDROmorphone  Injectable 0.5 milliGRAM(s) IV Push every 3 hours PRN      REVIEW OF SYSTEMS  --------------------------------------------------------------------------------  Gen: No weight changes, fatigue, fevers/chills, weakness  Skin: No rashes  Head/Eyes/Ears/Mouth: No headache; Normal hearing; Normal vision w/o blurriness; No sinus pain/discomfort, sore throat  Respiratory: No dyspnea, cough, wheezing, hemoptysis  CV: No chest pain, PND, orthopnea  GI: No abdominal pain, diarrhea, constipation, nausea, vomiting, melena, hematochezia  : No increased frequency, dysuria, hematuria, nocturia  MSK: No joint pain/swelling; no back pain; no edema  Neuro: No dizziness/lightheadedness, weakness, seizures, numbness, tingling  Heme: No easy bruising or bleeding  Endo: No heat/cold intolerance  Psych: No significant nervousness, anxiety, stress, depression    All other systems were reviewed and are negative, except as noted.    VITALS/PHYSICAL EXAM  --------------------------------------------------------------------------------  T(C): 36.2 (11-05-21 @ 07:15), Max: 37.3 (11-04-21 @ 21:21)  HR: 73 (11-05-21 @ 12:05) (73 - 95)  BP: 138/69 (11-05-21 @ 12:05) (138/69 - 171/81)  RR: 18 (11-05-21 @ 04:35) (16 - 18)  SpO2: 95% (11-05-21 @ 04:35) (94% - 98%)  Wt(kg): --        11-04-21 @ 07:01  -  11-05-21 @ 07:00  --------------------------------------------------------  IN: 0 mL / OUT: 1010 mL / NET: -1010 mL      Physical Exam:  	Gen: NAD, well-appearing  	HEENT: PERRL, supple neck, clear oropharynx  	Pulm: CTA B/L  	CV: RRR, S1S2; no rub  	Back: No spinal or CVA tenderness; no sacral edema  	Abd: +BS, soft, nontender/nondistended  	: No suprapubic tenderness  	UE: Warm, FROM, no clubbing, intact strength; no edema; no asterixis  	LE: Warm, FROM, no clubbing, intact strength; no edema  	Neuro: No focal deficits, intact gait  	Psych: Normal affect and mood  	Skin: Warm, without rashes    LABS/STUDIES  --------------------------------------------------------------------------------              8.4    16.45 >-----------<  357      [11-05-21 @ 03:06]              25.2     137  |  97  |  31.6  ----------------------------<  136      [11-05-21 @ 03:06]  3.2   |  27.0  |  1.33        Ca     7.6     [11-05-21 @ 03:06]      Mg     2.3     [11-05-21 @ 03:06]      Phos  2.3     [11-05-21 @ 03:06]        PTT: 46.7       [11-05-21 @ 11:23]      Creatinine Trend:  SCr 1.33 [11-05 @ 03:06]  SCr 1.23 [11-04 @ 07:19]  SCr 1.36 [11-03 @ 07:19]  SCr 1.42 [11-02 @ 04:50]  SCr 2.18 [11-01 @ 04:35]        Iron 29, TIBC 227, %sat 13      [12-05-20 @ 08:05]  Ferritin 1099      [12-05-20 @ 08:05]  TSH 2.16      [08-12-21 @ 12:05]  Lipid: chol --, , HDL --, LDL --      [11-15-20 @ 02:40]    HBsAb 322.1      [10-26-21 @ 01:09]  HBsAg Nonreact      [10-26-21 @ 01:09]  HCV 0.15, Nonreact      [10-26-21 @ 01:09]

## 2021-11-05 NOTE — PROGRESS NOTE ADULT - SUBJECTIVE AND OBJECTIVE BOX
INTERVAL HPI/OVERNIGHT EVENTS: Patient is a 61yMale presenting with SBO pod# 4 of ex-lap drainage of 3 mesenteric hematomas and abscess of abdominal wall. No events. Denying cp, sob, n/v, f/c. Ng tube out. Tolerating liquids. No pain issues. Chapman in place.    MEDICATIONS  (STANDING):  epoetin yolanda-epbx (RETACRIT) Injectable 94451 Unit(s) SubCutaneous <User Schedule>  heparin  Infusion 1100 Unit(s)/Hr (12 mL/Hr) IV Continuous <Continuous>  levETIRAcetam  IVPB 750 milliGRAM(s) IV Intermittent <User Schedule>  metoprolol tartrate Injectable 5 milliGRAM(s) IV Push every 6 hours  pantoprazole  Injectable 40 milliGRAM(s) IV Push daily  piperacillin/tazobactam IVPB.. 3.375 Gram(s) IV Intermittent every 12 hours  tamsulosin 0.4 milliGRAM(s) Oral at bedtime    MEDICATIONS  (PRN):  HYDROmorphone  Injectable 1 milliGRAM(s) IV Push every 3 hours PRN Severe Pain (7 - 10)  HYDROmorphone  Injectable 0.5 milliGRAM(s) IV Push every 3 hours PRN Moderate Pain (4 - 6)  HYDROmorphone  Injectable 0.5 milliGRAM(s) IV Push every 4 hours PRN break through pain      Vital Signs Last 24 Hrs  T(C): 37.3 (04 Nov 2021 21:21), Max: 37.3 (04 Nov 2021 21:21)  T(F): 99.1 (04 Nov 2021 21:21), Max: 99.1 (04 Nov 2021 21:21)  HR: 90 (04 Nov 2021 21:21) (58 - 98)  BP: 150/78 (04 Nov 2021 21:21) (148/70 - 190/76)  BP(mean): --  RR: 16 (04 Nov 2021 21:21) (16 - 18)  SpO2: 98% (04 Nov 2021 21:21) (91% - 98%)    Physical Exam:  General: well appearing, NAD  Head: NC, AT  EENT: EOMI, no scleral icterus  Cardiac: RRR, no apparent murmurs, no lower extremity edema  Respiratory: CTABL, no respiratory distress   Abdomen: Wound vac in place. Edges of wound w/o erythema/discharge, Clean. soft, mildly distended. nonperitonitic  MSK/Vascular: full ROM, soft compartments, warm extremities  Neuro: AAOx3, sensation to light touch intact  Psych: calm, cooperative    I&O's Detail    03 Nov 2021 07:01  -  04 Nov 2021 07:00  --------------------------------------------------------  IN:    Heparin: 60 mL    IV PiggyBack: 100 mL    Oral Fluid: 175 mL  Total IN: 335 mL    OUT:    Indwelling Catheter - Urethral (mL): 700 mL    Nasogastric/Oral tube (mL): 100 mL    Other (mL): 0 mL  Total OUT: 800 mL    Total NET: -465 mL      04 Nov 2021 07:01  -  05 Nov 2021 01:41  --------------------------------------------------------  IN:  Total IN: 0 mL    OUT:    Indwelling Catheter - Urethral (mL): 810 mL  Total OUT: 810 mL    Total NET: -810 mL          LABS:                        8.5    19.92 )-----------( 386      ( 04 Nov 2021 07:19 )             25.9     11-04    145  |  102  |  27.8<H>  ----------------------------<  110<H>  3.5   |  28.0  |  1.23    Ca    8.1<L>      04 Nov 2021 07:19  Phos  2.4     11-04  Mg     2.5     11-04      PTT - ( 04 Nov 2021 20:24 )  PTT:36.0 sec      RADIOLOGY & ADDITIONAL STUDIES:

## 2021-11-06 LAB
ANION GAP SERPL CALC-SCNC: 14 MMOL/L — SIGNIFICANT CHANGE UP (ref 5–17)
APTT BLD: 32.7 SEC — SIGNIFICANT CHANGE UP (ref 27.5–35.5)
APTT BLD: 47 SEC — HIGH (ref 27.5–35.5)
APTT BLD: 49.2 SEC — HIGH (ref 27.5–35.5)
APTT BLD: 53 SEC — HIGH (ref 27.5–35.5)
BASOPHILS # BLD AUTO: 0.04 K/UL — SIGNIFICANT CHANGE UP (ref 0–0.2)
BASOPHILS NFR BLD AUTO: 0.3 % — SIGNIFICANT CHANGE UP (ref 0–2)
BUN SERPL-MCNC: 24.9 MG/DL — HIGH (ref 8–20)
CALCIUM SERPL-MCNC: 7.4 MG/DL — LOW (ref 8.6–10.2)
CHLORIDE SERPL-SCNC: 100 MMOL/L — SIGNIFICANT CHANGE UP (ref 98–107)
CO2 SERPL-SCNC: 25 MMOL/L — SIGNIFICANT CHANGE UP (ref 22–29)
COLLECT DURATION TIME UR: 24 HR — SIGNIFICANT CHANGE UP
CREAT SERPL-MCNC: 1.07 MG/DL — SIGNIFICANT CHANGE UP (ref 0.5–1.3)
EOSINOPHIL # BLD AUTO: 0.47 K/UL — SIGNIFICANT CHANGE UP (ref 0–0.5)
EOSINOPHIL NFR BLD AUTO: 3.5 % — SIGNIFICANT CHANGE UP (ref 0–6)
GLUCOSE SERPL-MCNC: 111 MG/DL — HIGH (ref 70–99)
HCT VFR BLD CALC: 26.5 % — LOW (ref 39–50)
HGB BLD-MCNC: 8.7 G/DL — LOW (ref 13–17)
IMM GRANULOCYTES NFR BLD AUTO: 0.9 % — SIGNIFICANT CHANGE UP (ref 0–1.5)
INR BLD: 1.47 RATIO — HIGH (ref 0.88–1.16)
LYMPHOCYTES # BLD AUTO: 1.29 K/UL — SIGNIFICANT CHANGE UP (ref 1–3.3)
LYMPHOCYTES # BLD AUTO: 9.7 % — LOW (ref 13–44)
MAGNESIUM SERPL-MCNC: 2.2 MG/DL — SIGNIFICANT CHANGE UP (ref 1.6–2.6)
MCHC RBC-ENTMCNC: 29.6 PG — SIGNIFICANT CHANGE UP (ref 27–34)
MCHC RBC-ENTMCNC: 32.8 GM/DL — SIGNIFICANT CHANGE UP (ref 32–36)
MCV RBC AUTO: 90.1 FL — SIGNIFICANT CHANGE UP (ref 80–100)
MONOCYTES # BLD AUTO: 1.08 K/UL — HIGH (ref 0–0.9)
MONOCYTES NFR BLD AUTO: 8.2 % — SIGNIFICANT CHANGE UP (ref 2–14)
NEUTROPHILS # BLD AUTO: 10.24 K/UL — HIGH (ref 1.8–7.4)
NEUTROPHILS NFR BLD AUTO: 77.4 % — HIGH (ref 43–77)
PHOSPHATE SERPL-MCNC: 2.4 MG/DL — SIGNIFICANT CHANGE UP (ref 2.4–4.7)
PLATELET # BLD AUTO: 352 K/UL — SIGNIFICANT CHANGE UP (ref 150–400)
POTASSIUM SERPL-MCNC: 3.3 MMOL/L — LOW (ref 3.5–5.3)
POTASSIUM SERPL-SCNC: 3.3 MMOL/L — LOW (ref 3.5–5.3)
PROTHROM AB SERPL-ACNC: 16.7 SEC — HIGH (ref 10.6–13.6)
RBC # BLD: 2.94 M/UL — LOW (ref 4.2–5.8)
RBC # FLD: 15.5 % — HIGH (ref 10.3–14.5)
SODIUM SERPL-SCNC: 139 MMOL/L — SIGNIFICANT CHANGE UP (ref 135–145)
TOTAL VOLUME - 24 HOUR: 1500 ML — SIGNIFICANT CHANGE UP
UUN 24H UR-MRATE: 9 G/24H — SIGNIFICANT CHANGE UP (ref 6–24)
WBC # BLD: 13.24 K/UL — HIGH (ref 3.8–10.5)
WBC # FLD AUTO: 13.24 K/UL — HIGH (ref 3.8–10.5)

## 2021-11-06 PROCEDURE — 99233 SBSQ HOSP IP/OBS HIGH 50: CPT

## 2021-11-06 PROCEDURE — 99024 POSTOP FOLLOW-UP VISIT: CPT | Mod: GC

## 2021-11-06 RX ORDER — POTASSIUM CHLORIDE 20 MEQ
20 PACKET (EA) ORAL
Refills: 0 | Status: COMPLETED | OUTPATIENT
Start: 2021-11-06 | End: 2021-11-06

## 2021-11-06 RX ORDER — SODIUM,POTASSIUM PHOSPHATES 278-250MG
1 POWDER IN PACKET (EA) ORAL
Refills: 0 | Status: DISCONTINUED | OUTPATIENT
Start: 2021-11-06 | End: 2021-11-10

## 2021-11-06 RX ORDER — HEPARIN SODIUM 5000 [USP'U]/ML
INJECTION INTRAVENOUS; SUBCUTANEOUS
Qty: 25000 | Refills: 0 | Status: DISCONTINUED | OUTPATIENT
Start: 2021-11-06 | End: 2021-11-06

## 2021-11-06 RX ORDER — HEPARIN SODIUM 5000 [USP'U]/ML
1500 INJECTION INTRAVENOUS; SUBCUTANEOUS
Qty: 25000 | Refills: 0 | Status: DISCONTINUED | OUTPATIENT
Start: 2021-11-06 | End: 2021-11-07

## 2021-11-06 RX ADMIN — LEVETIRACETAM 400 MILLIGRAM(S): 250 TABLET, FILM COATED ORAL at 17:48

## 2021-11-06 RX ADMIN — HYDROMORPHONE HYDROCHLORIDE 1 MILLIGRAM(S): 2 INJECTION INTRAMUSCULAR; INTRAVENOUS; SUBCUTANEOUS at 06:35

## 2021-11-06 RX ADMIN — Medication 50 MILLIEQUIVALENT(S): at 11:38

## 2021-11-06 RX ADMIN — HYDROMORPHONE HYDROCHLORIDE 0.5 MILLIGRAM(S): 2 INJECTION INTRAMUSCULAR; INTRAVENOUS; SUBCUTANEOUS at 03:55

## 2021-11-06 RX ADMIN — HYDROMORPHONE HYDROCHLORIDE 1 MILLIGRAM(S): 2 INJECTION INTRAMUSCULAR; INTRAVENOUS; SUBCUTANEOUS at 19:01

## 2021-11-06 RX ADMIN — TAMSULOSIN HYDROCHLORIDE 0.4 MILLIGRAM(S): 0.4 CAPSULE ORAL at 23:32

## 2021-11-06 RX ADMIN — Medication 1 PACKET(S): at 17:49

## 2021-11-06 RX ADMIN — HYDROMORPHONE HYDROCHLORIDE 1 MILLIGRAM(S): 2 INJECTION INTRAMUSCULAR; INTRAVENOUS; SUBCUTANEOUS at 00:26

## 2021-11-06 RX ADMIN — HYDROMORPHONE HYDROCHLORIDE 0.5 MILLIGRAM(S): 2 INJECTION INTRAMUSCULAR; INTRAVENOUS; SUBCUTANEOUS at 00:13

## 2021-11-06 RX ADMIN — Medication 5 MILLIGRAM(S): at 17:48

## 2021-11-06 RX ADMIN — HYDROMORPHONE HYDROCHLORIDE 0.5 MILLIGRAM(S): 2 INJECTION INTRAMUSCULAR; INTRAVENOUS; SUBCUTANEOUS at 04:26

## 2021-11-06 RX ADMIN — HEPARIN SODIUM 1500 UNIT(S)/HR: 5000 INJECTION INTRAVENOUS; SUBCUTANEOUS at 16:45

## 2021-11-06 RX ADMIN — PIPERACILLIN AND TAZOBACTAM 25 GRAM(S): 4; .5 INJECTION, POWDER, LYOPHILIZED, FOR SOLUTION INTRAVENOUS at 00:13

## 2021-11-06 RX ADMIN — Medication 50 MILLIEQUIVALENT(S): at 16:31

## 2021-11-06 RX ADMIN — Medication 5 MILLIGRAM(S): at 11:39

## 2021-11-06 RX ADMIN — Medication 5 MILLIGRAM(S): at 00:13

## 2021-11-06 RX ADMIN — PIPERACILLIN AND TAZOBACTAM 25 GRAM(S): 4; .5 INJECTION, POWDER, LYOPHILIZED, FOR SOLUTION INTRAVENOUS at 23:33

## 2021-11-06 RX ADMIN — HYDROMORPHONE HYDROCHLORIDE 1 MILLIGRAM(S): 2 INJECTION INTRAMUSCULAR; INTRAVENOUS; SUBCUTANEOUS at 10:05

## 2021-11-06 RX ADMIN — Medication 5 MILLIGRAM(S): at 05:54

## 2021-11-06 RX ADMIN — PANTOPRAZOLE SODIUM 40 MILLIGRAM(S): 20 TABLET, DELAYED RELEASE ORAL at 11:39

## 2021-11-06 RX ADMIN — HYDROMORPHONE HYDROCHLORIDE 1 MILLIGRAM(S): 2 INJECTION INTRAMUSCULAR; INTRAVENOUS; SUBCUTANEOUS at 06:59

## 2021-11-06 RX ADMIN — Medication 50 MILLIEQUIVALENT(S): at 13:55

## 2021-11-06 RX ADMIN — HYDROMORPHONE HYDROCHLORIDE 1 MILLIGRAM(S): 2 INJECTION INTRAMUSCULAR; INTRAVENOUS; SUBCUTANEOUS at 14:20

## 2021-11-06 RX ADMIN — HYDROMORPHONE HYDROCHLORIDE 0.5 MILLIGRAM(S): 2 INJECTION INTRAMUSCULAR; INTRAVENOUS; SUBCUTANEOUS at 21:08

## 2021-11-06 RX ADMIN — HYDROMORPHONE HYDROCHLORIDE 1 MILLIGRAM(S): 2 INJECTION INTRAMUSCULAR; INTRAVENOUS; SUBCUTANEOUS at 13:51

## 2021-11-06 RX ADMIN — HEPARIN SODIUM 13 UNIT(S)/HR: 5000 INJECTION INTRAVENOUS; SUBCUTANEOUS at 00:22

## 2021-11-06 RX ADMIN — HYDROMORPHONE HYDROCHLORIDE 0.5 MILLIGRAM(S): 2 INJECTION INTRAMUSCULAR; INTRAVENOUS; SUBCUTANEOUS at 01:27

## 2021-11-06 RX ADMIN — Medication 5 MILLIGRAM(S): at 23:33

## 2021-11-06 RX ADMIN — HEPARIN SODIUM 15 UNIT(S)/HR: 5000 INJECTION INTRAVENOUS; SUBCUTANEOUS at 20:38

## 2021-11-06 RX ADMIN — HYDROMORPHONE HYDROCHLORIDE 1 MILLIGRAM(S): 2 INJECTION INTRAMUSCULAR; INTRAVENOUS; SUBCUTANEOUS at 19:30

## 2021-11-06 RX ADMIN — HYDROMORPHONE HYDROCHLORIDE 1 MILLIGRAM(S): 2 INJECTION INTRAMUSCULAR; INTRAVENOUS; SUBCUTANEOUS at 23:33

## 2021-11-06 RX ADMIN — HYDROMORPHONE HYDROCHLORIDE 1 MILLIGRAM(S): 2 INJECTION INTRAMUSCULAR; INTRAVENOUS; SUBCUTANEOUS at 10:24

## 2021-11-06 RX ADMIN — PIPERACILLIN AND TAZOBACTAM 25 GRAM(S): 4; .5 INJECTION, POWDER, LYOPHILIZED, FOR SOLUTION INTRAVENOUS at 11:38

## 2021-11-06 RX ADMIN — HYDROMORPHONE HYDROCHLORIDE 0.5 MILLIGRAM(S): 2 INJECTION INTRAMUSCULAR; INTRAVENOUS; SUBCUTANEOUS at 20:39

## 2021-11-06 NOTE — PROGRESS NOTE ADULT - ASSESSMENT
61y Male initially admitted for SBO. Underwent SBR on 10/26. RTOR on 10/31 for evacuation of mesenteric hematoma and drainage of anterior wall abscess. Moderate protein calorie malnutrition.    GI/Nutrition: NG tube out. Tolerating clears. Consider advancing diet  /Renal: Chapman for strict I&O, f/u am labs, HD as per nephro. ToV failed 11/4  ID: Zosyn  Skin: Repositioning for DTI prevention while in bed  Heme: Hb from 8.4>8.5  Heme/DVT Prophylaxis: SCDs, Hep gtt (last PTT was 47)

## 2021-11-06 NOTE — PROGRESS NOTE ADULT - SUBJECTIVE AND OBJECTIVE BOX
INTERVAL HPI/OVERNIGHT EVENTS: Patient is a 61yMale presenting with SBO pod# 4 of ex-lap drainage of 3 mesenteric hematomas and abscess of abdominal wall. No events. Denying cp, sob, n/v, f/c. Ng tube out. Tolerating liquids. Is hungry. Had BM today. No pain issues. Chapman in place.    MEDICATIONS  (STANDING):  epoetin yolanda-epbx (RETACRIT) Injectable 55662 Unit(s) SubCutaneous <User Schedule>  heparin  Infusion 1300 Unit(s)/Hr (13 mL/Hr) IV Continuous <Continuous>  levETIRAcetam  IVPB 750 milliGRAM(s) IV Intermittent <User Schedule>  metoprolol tartrate Injectable 5 milliGRAM(s) IV Push every 6 hours  pantoprazole  Injectable 40 milliGRAM(s) IV Push daily  piperacillin/tazobactam IVPB.. 3.375 Gram(s) IV Intermittent every 12 hours  tamsulosin 0.4 milliGRAM(s) Oral at bedtime    MEDICATIONS  (PRN):  HYDROmorphone  Injectable 0.5 milliGRAM(s) IV Push every 3 hours PRN Moderate Pain (4 - 6)  HYDROmorphone  Injectable 0.5 milliGRAM(s) IV Push every 4 hours PRN break through pain  HYDROmorphone  Injectable 1 milliGRAM(s) IV Push every 3 hours PRN Severe Pain (7 - 10)      Vital Signs Last 24 Hrs  T(C): 36.8 (05 Nov 2021 21:29), Max: 36.8 (05 Nov 2021 21:29)  T(F): 98.3 (05 Nov 2021 21:29), Max: 98.3 (05 Nov 2021 21:29)  HR: 89 (05 Nov 2021 21:29) (73 - 99)  BP: 135/76 (05 Nov 2021 21:29) (128/67 - 164/74)  BP(mean): --  RR: 16 (05 Nov 2021 21:29) (16 - 17)  SpO2: 98% (05 Nov 2021 21:29) (93% - 98%)    Physical Exam:    Neurological:  No sensory/motor deficits    HEENT: PERRLA, EOMI, no drainage or redness    Neck: No bruits; no thyromegaly or nodules,  No JVD    Back: Normal spine flexure, No CVA tenderness, No deformity or limitation of movement    Respiratory: Breath Sounds equal & clear to auscultation, no accessory muscle use    Cardiovascular: Regular rate & rhythm, normal S1, S2; no murmurs, gallops or rubs    Gastrointestinal: Wound vac in place. Incision is clean and non erythematous. Abdomin is tender, mostly around incision.    Extremities: No peripheral edema, No cyanosis, clubbing     Vascular: Equal and normal pulses: 2+ peripheral pulses throughout    Musculoskeletal: No joint pain, swelling or deformity; no limitation of movement    Skin: No rashes      I&O's Detail    04 Nov 2021 07:01  -  05 Nov 2021 07:00  --------------------------------------------------------  IN:  Total IN: 0 mL    OUT:    Indwelling Catheter - Urethral (mL): 1010 mL  Total OUT: 1010 mL    Total NET: -1010 mL          LABS:                        8.4    16.45 )-----------( 357      ( 05 Nov 2021 03:06 )             25.2     11-05    137  |  97<L>  |  31.6<H>  ----------------------------<  136<H>  3.2<L>   |  27.0  |  1.33<H>    Ca    7.6<L>      05 Nov 2021 03:06  Phos  2.3     11-05  Mg     2.3     11-05      PTT - ( 06 Nov 2021 00:01 )  PTT:47.0 sec      RADIOLOGY & ADDITIONAL STUDIES:

## 2021-11-06 NOTE — PROGRESS NOTE ADULT - SUBJECTIVE AND OBJECTIVE BOX
Chief Complaint:  SANTOS  24 hour events/subjective:  Seen/examined;  SCr improving off HD    PAST HISTORY  --------------------------------------------------------------------------------  No significant changes to PMH, PSH, FHx, SHx, unless otherwise noted    ALLERGIES & MEDICATIONS  --------------------------------------------------------------------------------  Allergies    penicillin (Other)    Intolerances    Standing Inpatient Medications  epoetin yolanda-epbx (RETACRIT) Injectable 63614 Unit(s) SubCutaneous <User Schedule>  heparin  Infusion 1300 Unit(s)/Hr IV Continuous <Continuous>  levETIRAcetam  IVPB 750 milliGRAM(s) IV Intermittent <User Schedule>  metoprolol tartrate Injectable 5 milliGRAM(s) IV Push every 6 hours  pantoprazole  Injectable 40 milliGRAM(s) IV Push daily  piperacillin/tazobactam IVPB.. 3.375 Gram(s) IV Intermittent every 12 hours  tamsulosin 0.4 milliGRAM(s) Oral at bedtime    PRN Inpatient Medications  HYDROmorphone  Injectable 1 milliGRAM(s) IV Push every 3 hours PRN  HYDROmorphone  Injectable 0.5 milliGRAM(s) IV Push every 4 hours PRN  HYDROmorphone  Injectable 0.5 milliGRAM(s) IV Push every 3 hours PRN      REVIEW OF SYSTEMS  --------------------------------------------------------------------------------  Gen: No weight changes, fatigue, fevers/chills, weakness  Skin: No rashes  Head/Eyes/Ears/Mouth: No headache; Normal hearing; Normal vision w/o blurriness; No sinus pain/discomfort, sore throat  Respiratory: No dyspnea, cough, wheezing, hemoptysis  CV: No chest pain, PND, orthopnea  GI: No abdominal pain, diarrhea, constipation, nausea, vomiting, melena, hematochezia  : No increased frequency, dysuria, hematuria, nocturia  MSK: No joint pain/swelling; no back pain; no edema  Neuro: No dizziness/lightheadedness, weakness, seizures, numbness, tingling  Heme: No easy bruising or bleeding  Endo: No heat/cold intolerance  Psych: No significant nervousness, anxiety, stress, depression    All other systems were reviewed and are negative, except as noted.    VITALS/PHYSICAL EXAM  --------------------------------------------------------------------------------  T(C): 36.2 (21 @ 07:15), Max: 37.3 (21 @ 21:21)  HR: 73 (21 @ 12:05) (73 - 95)  BP: 138/69 (21 @ 12:05) (138/69 - 171/81)  RR: 18 (21 @ 04:35) (16 - 18)  SpO2: 95% (21 @ 04:35) (94% - 98%)  Wt(kg): --    21 @ 07:01  -  21 @ 07:00  --------------------------------------------------------  IN: 0 mL / OUT: 1010 mL / NET: -1010 mL    Physical Exam:  	Gen: NAD, well-appearing  	HEENT: PERRL, supple neck, clear oropharynx  	Pulm: CTA B/L  	CV: RRR, S1S2; no rub  	Back: No spinal or CVA tenderness; no sacral edema  	Abd: +BS, soft, nontender/nondistended  	: No suprapubic tenderness  	UE: Warm, FROM, no clubbing, intact strength; no edema; no asterixis  	LE: Warm, FROM, no clubbing, intact strength; no edema  	Neuro: No focal deficits, intact gait  	Psych: Normal affect and mood  	Skin: Warm, without rashes    LABS/STUDIES  --------------------------------------------------------------------------------              8.4    16.45 >-----------<  357      [21 03:06]              25.2     137  |  97  |  31.6  ----------------------------<  136      [21 @ 03:06]  3.2   |  27.0  |  1.33        Ca     7.6     [21 03:06]      Mg     2.3     [21 03:06]      Phos  2.3     [21 03:06]    PTT: 46.7       [21 @ 11:23]    Creatinine Trend:  SCr 1.33 [:06]  SCr 1.23 [ 07:19]  SCr 1.36 [ 07:19]  SCr 1.42 [ 04:50]  SCr 2.18 [ 04:35]    Iron 29, TIBC 227, %sat 13      [20 @ 08:05]  Ferritin 1099      [20 @ 08:05]  TSH 2.16      [21 @ 12:05]  Lipid: chol --, , HDL --, LDL --      [11-15-20 @ 02:40]    HBsAb 322.1      [10-26-21 @ 01:09]  HBsAg Nonreact      [10-26-21 @ 01:09]  HCV 0.15, Nonreact      [10-26-21 @ 01:09]    1) ESRD on HD - Now recovered,   2) MBD of renal dx  3) Anemia of renal dx  4) Vol HTN    HD on hold  On epogen    139    |  100    |  24.9<H>  ----------------------------<  111<H>  Ca:7.4<L> (2021 07:47)  3.3<L>   |  25.0   |  1.07     eGFR if Non : 75  eGFR if : 87                             8.7<L>  13.24<H> )-----------( 352      ( 2021 07:47 )                 26.5<L>    Phos:2.4 mg/dL M.2 mg/dL    ( @ 07:47)    CrCl:56 mL/min<L>( @ 09:03)

## 2021-11-07 LAB
ANION GAP SERPL CALC-SCNC: 12 MMOL/L — SIGNIFICANT CHANGE UP (ref 5–17)
APTT BLD: 37.3 SEC — HIGH (ref 27.5–35.5)
APTT BLD: 38.1 SEC — HIGH (ref 27.5–35.5)
BASOPHILS # BLD AUTO: 0.05 K/UL — SIGNIFICANT CHANGE UP (ref 0–0.2)
BASOPHILS NFR BLD AUTO: 0.4 % — SIGNIFICANT CHANGE UP (ref 0–2)
BUN SERPL-MCNC: 20.3 MG/DL — HIGH (ref 8–20)
CALCIUM SERPL-MCNC: 7.3 MG/DL — LOW (ref 8.6–10.2)
CHLORIDE SERPL-SCNC: 103 MMOL/L — SIGNIFICANT CHANGE UP (ref 98–107)
CO2 SERPL-SCNC: 21 MMOL/L — LOW (ref 22–29)
CREAT SERPL-MCNC: 1.04 MG/DL — SIGNIFICANT CHANGE UP (ref 0.5–1.3)
EOSINOPHIL # BLD AUTO: 0.5 K/UL — SIGNIFICANT CHANGE UP (ref 0–0.5)
EOSINOPHIL NFR BLD AUTO: 3.8 % — SIGNIFICANT CHANGE UP (ref 0–6)
GLUCOSE SERPL-MCNC: 107 MG/DL — HIGH (ref 70–99)
HCT VFR BLD CALC: 24.7 % — LOW (ref 39–50)
HCT VFR BLD CALC: 25.8 % — LOW (ref 39–50)
HGB BLD-MCNC: 8 G/DL — LOW (ref 13–17)
HGB BLD-MCNC: 8.2 G/DL — LOW (ref 13–17)
IMM GRANULOCYTES NFR BLD AUTO: 1.2 % — SIGNIFICANT CHANGE UP (ref 0–1.5)
LYMPHOCYTES # BLD AUTO: 1.17 K/UL — SIGNIFICANT CHANGE UP (ref 1–3.3)
LYMPHOCYTES # BLD AUTO: 9 % — LOW (ref 13–44)
MAGNESIUM SERPL-MCNC: 2.1 MG/DL — SIGNIFICANT CHANGE UP (ref 1.8–2.6)
MCHC RBC-ENTMCNC: 29.3 PG — SIGNIFICANT CHANGE UP (ref 27–34)
MCHC RBC-ENTMCNC: 29.6 PG — SIGNIFICANT CHANGE UP (ref 27–34)
MCHC RBC-ENTMCNC: 31.8 GM/DL — LOW (ref 32–36)
MCHC RBC-ENTMCNC: 32.4 GM/DL — SIGNIFICANT CHANGE UP (ref 32–36)
MCV RBC AUTO: 91.5 FL — SIGNIFICANT CHANGE UP (ref 80–100)
MCV RBC AUTO: 92.1 FL — SIGNIFICANT CHANGE UP (ref 80–100)
MONOCYTES # BLD AUTO: 1.03 K/UL — HIGH (ref 0–0.9)
MONOCYTES NFR BLD AUTO: 7.9 % — SIGNIFICANT CHANGE UP (ref 2–14)
NEUTROPHILS # BLD AUTO: 10.11 K/UL — HIGH (ref 1.8–7.4)
NEUTROPHILS NFR BLD AUTO: 77.7 % — HIGH (ref 43–77)
PHOSPHATE SERPL-MCNC: 2.3 MG/DL — LOW (ref 2.4–4.7)
PLATELET # BLD AUTO: 318 K/UL — SIGNIFICANT CHANGE UP (ref 150–400)
PLATELET # BLD AUTO: 363 K/UL — SIGNIFICANT CHANGE UP (ref 150–400)
POTASSIUM SERPL-MCNC: 3.9 MMOL/L — SIGNIFICANT CHANGE UP (ref 3.5–5.3)
POTASSIUM SERPL-SCNC: 3.9 MMOL/L — SIGNIFICANT CHANGE UP (ref 3.5–5.3)
RBC # BLD: 2.7 M/UL — LOW (ref 4.2–5.8)
RBC # BLD: 2.8 M/UL — LOW (ref 4.2–5.8)
RBC # FLD: 15.4 % — HIGH (ref 10.3–14.5)
RBC # FLD: 15.5 % — HIGH (ref 10.3–14.5)
SODIUM SERPL-SCNC: 136 MMOL/L — SIGNIFICANT CHANGE UP (ref 135–145)
WBC # BLD: 12.76 K/UL — HIGH (ref 3.8–10.5)
WBC # BLD: 13.01 K/UL — HIGH (ref 3.8–10.5)
WBC # FLD AUTO: 12.76 K/UL — HIGH (ref 3.8–10.5)
WBC # FLD AUTO: 13.01 K/UL — HIGH (ref 3.8–10.5)

## 2021-11-07 PROCEDURE — 99024 POSTOP FOLLOW-UP VISIT: CPT | Mod: GC

## 2021-11-07 RX ORDER — MEROPENEM 1 G/30ML
1000 INJECTION INTRAVENOUS EVERY 8 HOURS
Refills: 0 | Status: DISCONTINUED | OUTPATIENT
Start: 2021-11-07 | End: 2021-11-07

## 2021-11-07 RX ORDER — LACTOBACILLUS ACIDOPHILUS 100MM CELL
1 CAPSULE ORAL THREE TIMES A DAY
Refills: 0 | Status: DISCONTINUED | OUTPATIENT
Start: 2021-11-07 | End: 2021-11-10

## 2021-11-07 RX ORDER — VANCOMYCIN HCL 1 G
1000 VIAL (EA) INTRAVENOUS EVERY 12 HOURS
Refills: 0 | Status: DISCONTINUED | OUTPATIENT
Start: 2021-11-07 | End: 2021-11-07

## 2021-11-07 RX ORDER — PANTOPRAZOLE SODIUM 20 MG/1
40 TABLET, DELAYED RELEASE ORAL
Refills: 0 | Status: DISCONTINUED | OUTPATIENT
Start: 2021-11-07 | End: 2021-11-10

## 2021-11-07 RX ORDER — METOPROLOL TARTRATE 50 MG
25 TABLET ORAL DAILY
Refills: 0 | Status: DISCONTINUED | OUTPATIENT
Start: 2021-11-07 | End: 2021-11-10

## 2021-11-07 RX ORDER — HEPARIN SODIUM 5000 [USP'U]/ML
18 INJECTION INTRAVENOUS; SUBCUTANEOUS
Qty: 25000 | Refills: 0 | Status: DISCONTINUED | OUTPATIENT
Start: 2021-11-07 | End: 2021-11-08

## 2021-11-07 RX ORDER — APIXABAN 2.5 MG/1
5 TABLET, FILM COATED ORAL EVERY 12 HOURS
Refills: 0 | Status: DISCONTINUED | OUTPATIENT
Start: 2021-11-07 | End: 2021-11-10

## 2021-11-07 RX ORDER — APIXABAN 2.5 MG/1
5 TABLET, FILM COATED ORAL EVERY 12 HOURS
Refills: 0 | Status: DISCONTINUED | OUTPATIENT
Start: 2021-11-07 | End: 2021-11-07

## 2021-11-07 RX ADMIN — HYDROMORPHONE HYDROCHLORIDE 1 MILLIGRAM(S): 2 INJECTION INTRAMUSCULAR; INTRAVENOUS; SUBCUTANEOUS at 13:03

## 2021-11-07 RX ADMIN — Medication 5 MILLIGRAM(S): at 05:26

## 2021-11-07 RX ADMIN — Medication 5 MILLIGRAM(S): at 13:04

## 2021-11-07 RX ADMIN — HYDROMORPHONE HYDROCHLORIDE 1 MILLIGRAM(S): 2 INJECTION INTRAMUSCULAR; INTRAVENOUS; SUBCUTANEOUS at 04:43

## 2021-11-07 RX ADMIN — HYDROMORPHONE HYDROCHLORIDE 1 MILLIGRAM(S): 2 INJECTION INTRAMUSCULAR; INTRAVENOUS; SUBCUTANEOUS at 07:17

## 2021-11-07 RX ADMIN — HYDROMORPHONE HYDROCHLORIDE 0.5 MILLIGRAM(S): 2 INJECTION INTRAMUSCULAR; INTRAVENOUS; SUBCUTANEOUS at 16:07

## 2021-11-07 RX ADMIN — Medication 1 TABLET(S): at 05:26

## 2021-11-07 RX ADMIN — HYDROMORPHONE HYDROCHLORIDE 1 MILLIGRAM(S): 2 INJECTION INTRAMUSCULAR; INTRAVENOUS; SUBCUTANEOUS at 06:49

## 2021-11-07 RX ADMIN — Medication 1 TABLET(S): at 16:06

## 2021-11-07 RX ADMIN — LEVETIRACETAM 400 MILLIGRAM(S): 250 TABLET, FILM COATED ORAL at 18:32

## 2021-11-07 RX ADMIN — Medication 1 TABLET(S): at 21:45

## 2021-11-07 RX ADMIN — HYDROMORPHONE HYDROCHLORIDE 1 MILLIGRAM(S): 2 INJECTION INTRAMUSCULAR; INTRAVENOUS; SUBCUTANEOUS at 13:20

## 2021-11-07 RX ADMIN — HYDROMORPHONE HYDROCHLORIDE 1 MILLIGRAM(S): 2 INJECTION INTRAMUSCULAR; INTRAVENOUS; SUBCUTANEOUS at 09:45

## 2021-11-07 RX ADMIN — HYDROMORPHONE HYDROCHLORIDE 1 MILLIGRAM(S): 2 INJECTION INTRAMUSCULAR; INTRAVENOUS; SUBCUTANEOUS at 10:00

## 2021-11-07 RX ADMIN — HYDROMORPHONE HYDROCHLORIDE 1 MILLIGRAM(S): 2 INJECTION INTRAMUSCULAR; INTRAVENOUS; SUBCUTANEOUS at 01:09

## 2021-11-07 RX ADMIN — HEPARIN SODIUM 0.18 UNIT(S)/HR: 5000 INJECTION INTRAVENOUS; SUBCUTANEOUS at 17:17

## 2021-11-07 RX ADMIN — MEROPENEM 100 MILLIGRAM(S): 1 INJECTION INTRAVENOUS at 05:26

## 2021-11-07 RX ADMIN — TAMSULOSIN HYDROCHLORIDE 0.4 MILLIGRAM(S): 0.4 CAPSULE ORAL at 21:45

## 2021-11-07 RX ADMIN — APIXABAN 5 MILLIGRAM(S): 2.5 TABLET, FILM COATED ORAL at 20:08

## 2021-11-07 RX ADMIN — HYDROMORPHONE HYDROCHLORIDE 0.5 MILLIGRAM(S): 2 INJECTION INTRAMUSCULAR; INTRAVENOUS; SUBCUTANEOUS at 20:22

## 2021-11-07 RX ADMIN — HYDROMORPHONE HYDROCHLORIDE 0.5 MILLIGRAM(S): 2 INJECTION INTRAMUSCULAR; INTRAVENOUS; SUBCUTANEOUS at 16:30

## 2021-11-07 RX ADMIN — HYDROMORPHONE HYDROCHLORIDE 1 MILLIGRAM(S): 2 INJECTION INTRAMUSCULAR; INTRAVENOUS; SUBCUTANEOUS at 03:27

## 2021-11-07 RX ADMIN — HYDROMORPHONE HYDROCHLORIDE 0.5 MILLIGRAM(S): 2 INJECTION INTRAMUSCULAR; INTRAVENOUS; SUBCUTANEOUS at 21:14

## 2021-11-07 RX ADMIN — HEPARIN SODIUM 16 UNIT(S)/HR: 5000 INJECTION INTRAVENOUS; SUBCUTANEOUS at 09:09

## 2021-11-07 RX ADMIN — Medication 1 PACKET(S): at 05:26

## 2021-11-07 RX ADMIN — PANTOPRAZOLE SODIUM 40 MILLIGRAM(S): 20 TABLET, DELAYED RELEASE ORAL at 13:03

## 2021-11-07 RX ADMIN — Medication 1 PACKET(S): at 18:32

## 2021-11-07 RX ADMIN — HYDROMORPHONE HYDROCHLORIDE 0.5 MILLIGRAM(S): 2 INJECTION INTRAMUSCULAR; INTRAVENOUS; SUBCUTANEOUS at 05:26

## 2021-11-07 RX ADMIN — HYDROMORPHONE HYDROCHLORIDE 1 MILLIGRAM(S): 2 INJECTION INTRAMUSCULAR; INTRAVENOUS; SUBCUTANEOUS at 18:30

## 2021-11-07 RX ADMIN — HYDROMORPHONE HYDROCHLORIDE 0.5 MILLIGRAM(S): 2 INJECTION INTRAMUSCULAR; INTRAVENOUS; SUBCUTANEOUS at 05:59

## 2021-11-07 NOTE — CONSULT NOTE ADULT - CONSULT REASON
Natali-operative Cardiac Risk Stratification
Extensive cardiac history s/p exploratory laparotomy with pressor requirement.
S/p exploratory laparotomy who could not be extubated in the OR.
ESRD HD
Evaluation of anticoagulation
urinary retention and obstruction

## 2021-11-07 NOTE — PROGRESS NOTE ADULT - SUBJECTIVE AND OBJECTIVE BOX
Acute Care Surgery/Trauma Surgery Progress Note:  Patient with past medical/surgical history of colectomy , colostomy and colostomy reversal, previous small bowel obstruction and  small bowel resection  is POD# 7 s/p exploratory laparotomy, drainage of 3 mesenteric hematomas and abscess of abdominal wall, he has been tolerating his clear liquid and full liquid diet, no nausea, no emesis, no abdominal pain, no abdominal distension, but he is having clusters of  watery diarrheas  # 6 during the day, white cell count is trending down   he fail his trial of void and was straight cath.   Patient afebrile, VSS. Pain well controlled. no fevers, no tachycardia         Scheduled Medications:   epoetin yolanda-epbx (RETACRIT) Injectable 64123 Unit(s) SubCutaneous <User Schedule>  heparin  Infusion 1500 Unit(s)/Hr (15 mL/Hr) IV Continuous <Continuous>  levETIRAcetam  IVPB 750 milliGRAM(s) IV Intermittent <User Schedule>  metoprolol tartrate Injectable 5 milliGRAM(s) IV Push every 6 hours  pantoprazole  Injectable 40 milliGRAM(s) IV Push daily  piperacillin/tazobactam IVPB.. 3.375 Gram(s) IV Intermittent every 12 hours  potassium phosphate / sodium phosphate Powder (PHOS-NaK) 1 Packet(s) Oral two times a day  tamsulosin 0.4 milliGRAM(s) Oral at bedtime    PRN Medications:  HYDROmorphone  Injectable 0.5 milliGRAM(s) IV Push every 3 hours PRN Moderate Pain (4 - 6)  HYDROmorphone  Injectable 0.5 milliGRAM(s) IV Push every 4 hours PRN break through pain  HYDROmorphone  Injectable 1 milliGRAM(s) IV Push every 3 hours PRN Severe Pain (7 - 10)      Objective:   T(F): 98 (11-06 @ 23:44), Max: 98.3 (11-06 @ 05:00)  HR: 90 (11-06 @ 23:44) (72 - 90)  BP: 165/70 (11-06 @ 23:44) (119/65 - 172/76)  RR: 18 (11-06 @ 23:44) (16 - 18)  SpO2: 97% (11-06 @ 23:44) (95% - 97%)      Physical Exam:   GEN: patient resting comfortably in bed, in no acute distress,   RESP: respirations are unlabored, no accessory muscle use, no conversational dyspnea  CVS: RRR  GI: Abdomen soft, non-tender, non-distended, no rebound tenderness / guarding, there is a midline wound vacc with good seal at 125 mm Hg and multiple scars in his abdomen  extremities no edema    I&O's    11-05 @ 07:01  -  11-06 @ 07:00  --------------------------------------------------------  IN:  Total IN: 0 mL    OUT:    Indwelling Catheter - Urethral (mL): 1100 mL  Total OUT: 1100 mL    Total NET: -1100 mL      11-06 @ 08:01  -  11-07 @ 00:28  --------------------------------------------------------  IN:  Total IN: 0 mL    OUT:    Indwelling Catheter - Urethral (mL): 325 mL    Intermittent Catheterization - Urethral (mL): 670 mL  Total OUT: 995 mL    Total NET: -995 mL          LABS:                        8.7    13.24 )-----------( 352      ( 06 Nov 2021 07:47 )             26.5     11-06    139  |  100  |  24.9<H>  ----------------------------<  111<H>  3.3<L>   |  25.0  |  1.07    Ca    7.4<L>      06 Nov 2021 07:47  Phos  2.4     11-06  Mg     2.2     11-06      PT/INR - ( 06 Nov 2021 07:47 )   PT: 16.7 sec;   INR: 1.47 ratio         PTT - ( 06 Nov 2021 22:19 )  PTT:53.0 sec      MICROBIOLOGY:     Culture - Surgical Swab (collected 11-01 @ 10:23)  Source: .Surgical Swab Swab Intra Abdominal  Final Report (11-03 @ 14:20):    Few Proteus mirabilis    Few Enterococcus faecalis    Few Enterococcus raffinosus    Moderate Bacteroides thetaiotamcron group "Susceptibilities not performed"  Organism: Proteus mirabilis  Enterococcus faecalis  Enterococcus raffinosus (11-03 @ 14:20)  Organism: Enterococcus raffinosus (11-03 @ 14:20)      -  Ampicillin: R >8 Predicts results to ampicillin/sulbactam, amoxacillin-clavulanate and  piperacillin-tazobactam.      -  Tetra/Doxy: R >8      -  Vancomycin: S 1      Method Type: EDMUND  Organism: Enterococcus faecalis (11-03 @ 14:20)      -  Ampicillin: R 8 Predicts results to ampicillin/sulbactam, amoxacillin-clavulanate and  piperacillin-tazobactam.      -  Tetra/Doxy: R >8      -  Vancomycin: S 2      Method Type: EDMUND  Organism: Proteus mirabilis (11-03 @ 14:20)      -  Amikacin: S <=16      -  Amoxicillin/Clavulanic Acid: S <=8/4      -  Ampicillin: S <=8 These ampicillin results predict results for amoxicillin      -  Ampicillin/Sulbactam: S <=4/2 Enterobacter, Citrobacter, and Serratia may develop resistance during prolonged therapy (3-4 days)      -  Aztreonam: S <=4      -  Cefazolin: S <=2 Enterobacter, Citrobacter, and Serratia may develop resistance during prolonged therapy (3-4 days)      -  Cefepime: S <=2      -  Cefoxitin: S <=8      -  Ceftriaxone: S <=1 Enterobacter, Citrobacter, and Serratia may develop resistance during prolonged therapy      -  Ciprofloxacin: S <=0.25      -  Ertapenem: S <=0.5      -  Gentamicin: S <=2      -  Levofloxacin: S <=0.5      -  Meropenem: S <=1      -  Piperacillin/Tazobactam: S <=8      -  Tobramycin: S <=2      -  Trimethoprim/Sulfamethoxazole: S <=0.5/9.5      Method Type: EDMUND    Culture - Surgical Swab (collected 11-01 @ 10:22)  Source: .Surgical Swab abdomen  Final Report (11-04 @ 17:08):    Few Proteus mirabilis    Moderate Enterococcus faecalis    Few Enterococcus raffinosus    Moderate Bacteroides thetaiotamcron group "Susceptibilities not performed"  Organism: Proteus mirabilis  Enterococcus faecalis  Enterococcus raffinosus (11-04 @ 17:08)  Organism: Enterococcus raffinosus (11-04 @ 17:08)      -  Ampicillin: R >8 Predicts results to ampicillin/sulbactam, amoxacillin-clavulanate and  piperacillin-tazobactam.      -  Tetra/Doxy: R >8      -  Vancomycin: S 1      Method Type: EDMUND  Organism: Enterococcus faecalis (11-04 @ 17:08)      -  Ampicillin: S <=2 Predicts results to ampicillin/sulbactam, amoxacillin-clavulanate and  piperacillin-tazobactam.      -  Tetra/Doxy: S <=1      -  Vancomycin: S 2      Method Type: EDMUND  Organism: Proteus mirabilis (11-04 @ 17:08)      -  Amikacin: S <=16      -  Amoxicillin/Clavulanic Acid: S <=8/4      -  Ampicillin: S <=8 These ampicillin results predict results for amoxicillin      -  Ampicillin/Sulbactam: S <=4/2 Enterobacter, Citrobacter, and Serratia may develop resistance during prolonged therapy (3-4 days)      -  Aztreonam: S <=4      -  Cefazolin: S <=2 Enterobacter, Citrobacter, and Serratia may develop resistance during prolonged therapy (3-4 days)      -  Cefepime: S <=2      -  Cefoxitin: S <=8      -  Ceftriaxone: S <=1 Enterobacter, Citrobacter, and Serratia may develop resistance during prolonged therapy      -  Ciprofloxacin: S <=0.25      -  Ertapenem: S <=0.5      -  Gentamicin: S <=2      -  Levofloxacin: S <=0.5      -  Meropenem: S <=1      -  Piperacillin/Tazobactam: S <=8      -  Tobramycin: S <=2      -  Trimethoprim/Sulfamethoxazole: S <=0.5/9.5      Method Type: EDMUND    Culture - Surgical Swab (collected 11-01 @ 10:21)  Source: .Surgical Swab  Final Report (11-04 @ 17:10):    Moderate Proteus mirabilis    Few Enterococcus raffinosus    Few Enterococcus faecalis    Moderate Bacteroides thetaiotamcron group "Susceptibilities not performed"  Organism: Proteus mirabilis  Enterococcus raffinosus  Enterococcus faecalis (11-04 @ 17:10)  Organism: Enterococcus faecalis (11-04 @ 17:10)      -  Ampicillin: S <=2 Predicts results to ampicillin/sulbactam, amoxacillin-clavulanate and  piperacillin-tazobactam.      -  Tetra/Doxy: S <=1      -  Vancomycin: S 4      Method Type: EDMUND  Organism: Enterococcus raffinosus (11-04 @ 17:10)      -  Ampicillin: R >8 Predicts results to ampicillin/sulbactam, amoxacillin-clavulanate and  piperacillin-tazobactam.      -  Tetra/Doxy: R >8      -  Vancomycin: S 1      Method Type: EDMUND  Organism: Proteus mirabilis (11-04 @ 17:10)      -  Amikacin: S <=16      -  Amoxicillin/Clavulanic Acid: S <=8/4      -  Ampicillin: S <=8 These ampicillin results predict results for amoxicillin      -  Ampicillin/Sulbactam: S <=4/2 Enterobacter, Citrobacter, and Serratia may develop resistance during prolonged therapy (3-4 days)      -  Aztreonam: S <=4      -  Cefazolin: S <=2 Enterobacter, Citrobacter, and Serratia may develop resistance during prolonged therapy (3-4 days)      -  Cefepime: S <=2      -  Cefoxitin: S <=8      -  Ceftriaxone: S <=1 Enterobacter, Citrobacter, and Serratia may develop resistance during prolonged therapy      -  Ciprofloxacin: S <=0.25      -  Ertapenem: S <=0.5      -  Gentamicin: S <=2      -  Levofloxacin: S <=0.5      -  Meropenem: S <=1      -  Piperacillin/Tazobactam: S <=8      -  Tobramycin: S <=2      -  Trimethoprim/Sulfamethoxazole: S <=0.5/9.5      Method Type: EDMUND        PATHOLOGY:  Surgical Pathology Report:   ACCESSION No:  [id number]      Accession:                             [id number]    Collected Date/Time:                   10/31/2021 17:00 EDT  Received Date/Time:                    11/1/2021 09:57 EDT    Surgical Pathology Report - Auth (Verified)    Specimen(s) Submitted  1  Transverse colonic mesenteric hematoma    Final Diagnosis    Transverse colon mesentery, excision  - Hematoma , acute    Verified by: Av Pollack M.D.  (Electronic Signature)  Reported on: 11/05/21 15:24 EDT, [address] [place] [address]  Phone: [phone]   Fax: [phone]  _________________________________________________________________    Clinical Information  Small bowel obstruction      Gross Description  Received:  In formalin labeled  "transverse colonic mesenteric hematoma"  Size:  One fragment measuring 7.5 x 5 x 2.5 cm  Description:  Dark red-tan soft to rubbery tissue  Submitted:  Representative tissue in one cassette: 1A    Maryjane Martino 11/01/2021 10:22 AM    Disclaimer  In addition toother data that may appear on the specimen containers, all  labels have been inspected to confirm the presence of the patient's name  and date of birth.    Histological Processing Performed at [facility],  Department of Pathology, [address] [place] [address]. (10-31-21 @ 17:00)  Surgical Pathology Report:   ACCESSION No:  [id number]      Accession:                             [id number]    Collected Date/Time:                   10/26/2021 17:00 EDT  Received Date/Time:                    10/27/2021 09:10 EDT    Surgical Pathology Report - Auth (Verified)    Specimen(s) Submitted  1  Small bowel segment    Final Diagnosis    Small intestine, NOS, segmental resection ( length = 3.5  cm)  - Dense serosal adhesions, and chronic serositis / peritonitis  - Negative for chronic changes of inflammatory bowel disease,  dysplasia,    or malignancy  - Anastomotic margins: Mucosa appears viable. Mild chronic serositis  present  Verified by: Av Pollack M.D.  (Electronic Signature)  Reported on: 10/30/21 10:56 EDT, [address] [place] [address]  Phone: [phone]   Fax: [phone]  _________________________________________________________________      Comment    Medical record / operative findings reviewed    Clinical Information  Small bowel obstruction    Gross Description  Received:  In formalin labeled  "small bowel segment" , consisting of an  unoriented segment of bowel with two stapled ends  Dimensions:  3.5 cm in length, and from 1.9 to 4.0 cm cm in diameter.  Additional Comments:   The serosa is pink-tan and focally hemorrhagic and  smooth with scant attached focally hemorrhagic adipose tissue. There is  an area of mild stricture, 0.5 cm from the smaller stapled margin. On  opening, there is a 100% circumferential area of focal hemorrhage at the  mucosa measuring 1.9 x 0.4 x 0.1 cm corresponding to the stricture. No  definitive lesions are grossly identified. The remaining mucosa is pink  red folded and unremarkable. The bowel measures from 0.1 to 0.5 cm in  thickness. The fat is unremarkable.  Submitted:  Representative tissue in 5 cassettes:  1A: Smaller stapled margin shaved  1B-1C: Larger stapled margin shaved  1D-1E: Representative area of stricture with adjacent normal mucosa    Maryjane Gunner 10/27/2021 09:41 AM      Disclaimer  In addition to other data that may appear on the specimen containers, all  labels have been inspected to confirm the presence of the patient's name  and date of birth.    Histological Processing Performed at [facility],  Department of Pathology, [address] [place] [address]. (10-26-21 @ 17:00)       Acute Care Surgery/Trauma Surgery Progress Note:  Patient with past medical/surgical history of colectomy , colostomy and colostomy reversal, previous small bowel obstruction and  small bowel resection  is POD# 7 s/p exploratory laparotomy, drainage of 3 mesenteric hematomas and abscess of abdominal wall (10/26/2021 - ex-lap / RENEA / small bowel resection for SBO  and 10/31/2021 - ex-lap / evacuation of mesenteric hematoma and abcesses) he has been tolerating his clear liquid and full liquid diet, no nausea, no emesis, no abdominal pain, no abdominal distension, but he is having clusters of  watery diarrheas  # 6 during the day, white cell count is trending down   he fail his trial of void and was straight cath.   Patient afebrile, VSS. Pain well controlled. no fevers, no tachycardia         Scheduled Medications:   epoetin yolanda-epbx (RETACRIT) Injectable 94061 Unit(s) SubCutaneous <User Schedule>  heparin  Infusion 1500 Unit(s)/Hr (15 mL/Hr) IV Continuous <Continuous>  levETIRAcetam  IVPB 750 milliGRAM(s) IV Intermittent <User Schedule>  metoprolol tartrate Injectable 5 milliGRAM(s) IV Push every 6 hours  pantoprazole  Injectable 40 milliGRAM(s) IV Push daily  piperacillin/tazobactam IVPB.. 3.375 Gram(s) IV Intermittent every 12 hours  potassium phosphate / sodium phosphate Powder (PHOS-NaK) 1 Packet(s) Oral two times a day  tamsulosin 0.4 milliGRAM(s) Oral at bedtime    PRN Medications:  HYDROmorphone  Injectable 0.5 milliGRAM(s) IV Push every 3 hours PRN Moderate Pain (4 - 6)  HYDROmorphone  Injectable 0.5 milliGRAM(s) IV Push every 4 hours PRN break through pain  HYDROmorphone  Injectable 1 milliGRAM(s) IV Push every 3 hours PRN Severe Pain (7 - 10)      Objective:   T(F): 98 (11-06 @ 23:44), Max: 98.3 (11-06 @ 05:00)  HR: 90 (11-06 @ 23:44) (72 - 90)  BP: 165/70 (11-06 @ 23:44) (119/65 - 172/76)  RR: 18 (11-06 @ 23:44) (16 - 18)  SpO2: 97% (11-06 @ 23:44) (95% - 97%)      Physical Exam:   GEN: patient resting comfortably in bed, in no acute distress,   RESP: respirations are unlabored, no accessory muscle use, no conversational dyspnea  CVS: RRR  GI: Abdomen soft, non-tender, non-distended, no rebound tenderness / guarding, there is a midline wound vacc with good seal at 125 mm Hg and multiple scars in his abdomen  extremities no edema    I&O's    11-05 @ 07:01  -  11-06 @ 07:00  --------------------------------------------------------  IN:  Total IN: 0 mL    OUT:    Indwelling Catheter - Urethral (mL): 1100 mL  Total OUT: 1100 mL    Total NET: -1100 mL      11-06 @ 08:01  -  11-07 @ 00:28  --------------------------------------------------------  IN:  Total IN: 0 mL    OUT:    Indwelling Catheter - Urethral (mL): 325 mL    Intermittent Catheterization - Urethral (mL): 670 mL  Total OUT: 995 mL    Total NET: -995 mL          LABS:                        8.7    13.24 )-----------( 352      ( 06 Nov 2021 07:47 )             26.5     11-06    139  |  100  |  24.9<H>  ----------------------------<  111<H>  3.3<L>   |  25.0  |  1.07    Ca    7.4<L>      06 Nov 2021 07:47  Phos  2.4     11-06  Mg     2.2     11-06      PT/INR - ( 06 Nov 2021 07:47 )   PT: 16.7 sec;   INR: 1.47 ratio         PTT - ( 06 Nov 2021 22:19 )  PTT:53.0 sec      MICROBIOLOGY:     Culture - Surgical Swab (collected 11-01 @ 10:23)  Source: .Surgical Swab Swab Intra Abdominal  Final Report (11-03 @ 14:20):    Few Proteus mirabilis    Few Enterococcus faecalis    Few Enterococcus raffinosus    Moderate Bacteroides thetaiotamcron group "Susceptibilities not performed"  Organism: Proteus mirabilis  Enterococcus faecalis  Enterococcus raffinosus (11-03 @ 14:20)  Organism: Enterococcus raffinosus (11-03 @ 14:20)      -  Ampicillin: R >8 Predicts results to ampicillin/sulbactam, amoxacillin-clavulanate and  piperacillin-tazobactam.      -  Tetra/Doxy: R >8      -  Vancomycin: S 1      Method Type: EDMUND  Organism: Enterococcus faecalis (11-03 @ 14:20)      -  Ampicillin: R 8 Predicts results to ampicillin/sulbactam, amoxacillin-clavulanate and  piperacillin-tazobactam.      -  Tetra/Doxy: R >8      -  Vancomycin: S 2      Method Type: EDMUND  Organism: Proteus mirabilis (11-03 @ 14:20)      -  Amikacin: S <=16      -  Amoxicillin/Clavulanic Acid: S <=8/4      -  Ampicillin: S <=8 These ampicillin results predict results for amoxicillin      -  Ampicillin/Sulbactam: S <=4/2 Enterobacter, Citrobacter, and Serratia may develop resistance during prolonged therapy (3-4 days)      -  Aztreonam: S <=4      -  Cefazolin: S <=2 Enterobacter, Citrobacter, and Serratia may develop resistance during prolonged therapy (3-4 days)      -  Cefepime: S <=2      -  Cefoxitin: S <=8      -  Ceftriaxone: S <=1 Enterobacter, Citrobacter, and Serratia may develop resistance during prolonged therapy      -  Ciprofloxacin: S <=0.25      -  Ertapenem: S <=0.5      -  Gentamicin: S <=2      -  Levofloxacin: S <=0.5      -  Meropenem: S <=1      -  Piperacillin/Tazobactam: S <=8      -  Tobramycin: S <=2      -  Trimethoprim/Sulfamethoxazole: S <=0.5/9.5      Method Type: EDMUND    Culture - Surgical Swab (collected 11-01 @ 10:22)  Source: .Surgical Swab abdomen  Final Report (11-04 @ 17:08):    Few Proteus mirabilis    Moderate Enterococcus faecalis    Few Enterococcus raffinosus    Moderate Bacteroides thetaiotamcron group "Susceptibilities not performed"  Organism: Proteus mirabilis  Enterococcus faecalis  Enterococcus raffinosus (11-04 @ 17:08)  Organism: Enterococcus raffinosus (11-04 @ 17:08)      -  Ampicillin: R >8 Predicts results to ampicillin/sulbactam, amoxacillin-clavulanate and  piperacillin-tazobactam.      -  Tetra/Doxy: R >8      -  Vancomycin: S 1      Method Type: EDMUND  Organism: Enterococcus faecalis (11-04 @ 17:08)      -  Ampicillin: S <=2 Predicts results to ampicillin/sulbactam, amoxacillin-clavulanate and  piperacillin-tazobactam.      -  Tetra/Doxy: S <=1      -  Vancomycin: S 2      Method Type: EDMUND  Organism: Proteus mirabilis (11-04 @ 17:08)      -  Amikacin: S <=16      -  Amoxicillin/Clavulanic Acid: S <=8/4      -  Ampicillin: S <=8 These ampicillin results predict results for amoxicillin      -  Ampicillin/Sulbactam: S <=4/2 Enterobacter, Citrobacter, and Serratia may develop resistance during prolonged therapy (3-4 days)      -  Aztreonam: S <=4      -  Cefazolin: S <=2 Enterobacter, Citrobacter, and Serratia may develop resistance during prolonged therapy (3-4 days)      -  Cefepime: S <=2      -  Cefoxitin: S <=8      -  Ceftriaxone: S <=1 Enterobacter, Citrobacter, and Serratia may develop resistance during prolonged therapy      -  Ciprofloxacin: S <=0.25      -  Ertapenem: S <=0.5      -  Gentamicin: S <=2      -  Levofloxacin: S <=0.5      -  Meropenem: S <=1      -  Piperacillin/Tazobactam: S <=8      -  Tobramycin: S <=2      -  Trimethoprim/Sulfamethoxazole: S <=0.5/9.5      Method Type: EDMUND    Culture - Surgical Swab (collected 11-01 @ 10:21)  Source: .Surgical Swab  Final Report (11-04 @ 17:10):    Moderate Proteus mirabilis    Few Enterococcus raffinosus    Few Enterococcus faecalis    Moderate Bacteroides thetaiotamcron group "Susceptibilities not performed"  Organism: Proteus mirabilis  Enterococcus raffinosus  Enterococcus faecalis (11-04 @ 17:10)  Organism: Enterococcus faecalis (11-04 @ 17:10)      -  Ampicillin: S <=2 Predicts results to ampicillin/sulbactam, amoxacillin-clavulanate and  piperacillin-tazobactam.      -  Tetra/Doxy: S <=1      -  Vancomycin: S 4      Method Type: EDMUND  Organism: Enterococcus raffinosus (11-04 @ 17:10)      -  Ampicillin: R >8 Predicts results to ampicillin/sulbactam, amoxacillin-clavulanate and  piperacillin-tazobactam.      -  Tetra/Doxy: R >8      -  Vancomycin: S 1      Method Type: EDMUND  Organism: Proteus mirabilis (11-04 @ 17:10)      -  Amikacin: S <=16      -  Amoxicillin/Clavulanic Acid: S <=8/4      -  Ampicillin: S <=8 These ampicillin results predict results for amoxicillin      -  Ampicillin/Sulbactam: S <=4/2 Enterobacter, Citrobacter, and Serratia may develop resistance during prolonged therapy (3-4 days)      -  Aztreonam: S <=4      -  Cefazolin: S <=2 Enterobacter, Citrobacter, and Serratia may develop resistance during prolonged therapy (3-4 days)      -  Cefepime: S <=2      -  Cefoxitin: S <=8      -  Ceftriaxone: S <=1 Enterobacter, Citrobacter, and Serratia may develop resistance during prolonged therapy      -  Ciprofloxacin: S <=0.25      -  Ertapenem: S <=0.5      -  Gentamicin: S <=2      -  Levofloxacin: S <=0.5      -  Meropenem: S <=1      -  Piperacillin/Tazobactam: S <=8      -  Tobramycin: S <=2      -  Trimethoprim/Sulfamethoxazole: S <=0.5/9.5      Method Type: EDMUND        PATHOLOGY:  Surgical Pathology Report:   ACCESSION No:  [id number]      Accession:                             [id number]    Collected Date/Time:                   10/31/2021 17:00 EDT  Received Date/Time:                    11/1/2021 09:57 EDT    Surgical Pathology Report - Auth (Verified)    Specimen(s) Submitted  1  Transverse colonic mesenteric hematoma    Final Diagnosis    Transverse colon mesentery, excision  - Hematoma , acute    Verified by: Av F [place], M.D.  (Electronic Signature)  Reported on: 11/05/21 15:24 EDT, [address] [place] [address]  Phone: [phone]   Fax: [phone]  _________________________________________________________________    Clinical Information  Small bowel obstruction      Gross Description  Received:  In formalin labeled  "transverse colonic mesenteric hematoma"  Size:  One fragment measuring 7.5 x 5 x 2.5 cm  Description:  Dark red-tan soft to rubbery tissue  Submitted:  Representative tissue in one cassette: 1A    Maryjane Kwongzach 11/01/2021 10:22 AM    Disclaimer  In addition toother data that may appear on the specimen containers, all  labels have been inspected to confirm the presence of the patient's name  and date of birth.    Histological Processing Performed at [facility],  Department of Pathology, [address] [place] [address]. (10-31-21 @ 17:00)  Surgical Pathology Report:   ACCESSION No:  [id number]      Accession:                             [id number]    Collected Date/Time:                   10/26/2021 17:00 EDT  Received Date/Time:                    10/27/2021 09:10 EDT    Surgical Pathology Report - Auth (Verified)    Specimen(s) Submitted  1  Small bowel segment    Final Diagnosis    Small intestine, NOS, segmental resection ( length = 3.5  cm)  - Dense serosal adhesions, and chronic serositis / peritonitis  - Negative for chronic changes of inflammatory bowel disease,  dysplasia,    or malignancy  - Anastomotic margins: Mucosa appears viable. Mild chronic serositis  present  Verified by: Av Pollack M.D.  (Electronic Signature)  Reported on: 10/30/21 10:56 EDT, [address] [place] [address]  Phone: [phone]   Fax: [phone]  _________________________________________________________________      Comment    Medical record / operative findings reviewed    Clinical Information  Small bowel obstruction    Gross Description  Received:  In formalin labeled  "small bowel segment" , consisting of an  unoriented segment of bowel with two stapled ends  Dimensions:  3.5 cm in length, and from 1.9 to 4.0 cm cm in diameter.  Additional Comments:   The serosa is pink-tan and focally hemorrhagic and  smooth with scant attached focally hemorrhagic adipose tissue. There is  an area of mild stricture, 0.5 cm from the smaller stapled margin. On  opening, there is a 100% circumferential area of focal hemorrhage at the  mucosa measuring 1.9 x 0.4 x 0.1 cm corresponding to the stricture. No  definitive lesions are grossly identified. The remaining mucosa is pink  red folded and unremarkable. The bowel measures from 0.1 to 0.5 cm in  thickness. The fat is unremarkable.  Submitted:  Representative tissue in 5 cassettes:  1A: Smaller stapled margin shaved  1B-1C: Larger stapled margin shaved  1D-1E: Representative area of stricture with adjacent normal mucosa    Maryjane Martino 10/27/2021 09:41 AM      Disclaimer  In addition to other data that may appear on the specimen containers, all  labels have been inspected to confirm the presence of the patient's name  and date of birth.    Histological Processing Performed at [facility],  Department of Pathology, [address] [place] [address]. (10-26-21 @ 17:00)

## 2021-11-07 NOTE — CONSULT NOTE ADULT - SUBJECTIVE AND OBJECTIVE BOX
Patient is a 61y old  Male who presents with a chief complaint of Small bowel obstruction (07 Nov 2021 00:27)      HPI:  HPI: 61y Male present to ED with abdominal pain. He reports two days of constipation, which is uncharacteristic for him, and progressive abdominal pain, n/v. Reports history of bowel resection requiring an ostomy and reversal in past and since that time he has had a BM with every meal. He has had episodes where he went longer than usual without BMs but not like this time. States his last BM and last flatus was 2 days prior to presentation.  Pt. was admitted on 10/23 and bowel rest and NGT was started.  Pt. never opened up and had a e-lap with SBR on 10/26.  He subsequently underwent an e-lap and drain of mesenteric hematoma and abscess on 10/31.  He had a painting present post-operatively and painting was d/c'd yesterday.  He has since failed 2 TOV's.  Urology consulted for urinary retention ( 550ml on bladder scan) and possible obstruction.    ROS: 10-system review is otherwise negative except HPI above.      PAST MEDICAL & SURGICAL HISTORY:  CHF (congestive heart failure)  CVA (cerebral vascular accident)  Seizures  last seizure 10 years ago  HTN (hypertension)  Hyperlipemia  COVID-19 october 2020  Atrial fibrillation  ESRD on dialysis  Tu/Thurs/Sat  Former smoker  History of cocaine use  remote hx, currently sober  H/O aortic dissection  s/p repair (2010), surgery complicated by bowel ischemia s/p bowel resection and ostomy with reversal  H/O aortic valve insufficiency  Mitral regurgitation  GIB (gastrointestinal bleeding)  CAD (coronary artery disease)    s/p PCI 1998  and CABG x 2 11/2020  H/O colectomy  Status post double vessel coronary artery bypass  11/2020 Dr Butler  S/P AVR (aortic valve replacement)  (t)AVR 11/2020 Dr Butler  S/P MVR (mitral valve replacement)  (t)MVR 11/2020 Dr Butler  H/O aortic dissection  Type A; emergent repair 2010  AV fistula  LUE      FAMILY HISTORY:  FH: hypertension    Family history of cardiac disorder (Mother)  mother      Family history not pertinent as reviewed with the patient.    SOCIAL HISTORY:  Denies any toxic habits    ALLERGIES: NKA penicillin (Other)      HOME MEDICATIONS:   Home Medications:  ascorbic acid 500 mg oral tablet: 1 tab(s) orally once a day (14 Sep 2021 10:29)  atorvastatin 40 mg oral tablet: 1 tab(s) orally once a day (at bedtime) (14 Sep 2021 10:29)  Eliquis 5 mg oral tablet: 1  orally once a day (14 Sep 2021 10:29)  gabapentin 100 mg oral capsule: 1 cap(s) orally once a day (at bedtime) (14 Sep 2021 10:29)  levETIRAcetam 750 mg oral tablet: 1 tab(s) orally 2 times a day (14 Sep 2021 10:29)  sevelamer hydrochloride 800 mg oral tablet: 1 tab(s) orally 2 times a day (with meals) (14 Sep 2021 10:29)  Tab-A-Russel oral tablet: 1 tab(s) orally once a day (14 Sep 2021 10:29)      --------------------------------------------------------------------------------------------             (23 Oct 2021 04:32)      PAST MEDICAL & SURGICAL HISTORY:  CHF (congestive heart failure)    CVA (cerebral vascular accident)    Seizures  last seizure 10 years ago    HTN (hypertension)    Hyperlipemia    COVID-19 october 2020    Atrial fibrillation    ESRD on dialysis  Tu/Thurs/Sat    Former smoker    History of cocaine use  remote hx, currently sober    H/O aortic dissection  s/p repair (2010), surgery complicated by bowel ischemia s/p bowel resection and ostomy with reversal    H/O aortic valve insufficiency    Mitral regurgitation    GIB (gastrointestinal bleeding)    CAD (coronary artery disease)  s/p PCI 1998  and CABG x 2 11/2020    H/O colectomy    Status post double vessel coronary artery bypass  11/2020 Dr Butler    S/P AVR (aortic valve replacement)  (t)AVR 11/2020 Dr Butler    S/P MVR (mitral valve replacement)  (t)MVR 11/2020 Dr Butler    H/O aortic dissection  Type A; emergent repair 2010    AV fistula  LUE        REVIEW OF SYSTEMS:    CONSTITUTIONAL:  No fevers or chills  HEENT: No visual changes  ENDO: No sweating  NECK: No pain or stiffness  MUSCULOSKELETAL: No back pain, no joint pain  RESPIRATORY: No shortness of breath  CARDIOVASCULAR: No chest pain  GASTROINTESTINAL: Pt. c/o mirna-incisional tenderness, denies N/V, but admits to loose BM's  NEUROLOGICAL: No mental status changes  PSYCH: No depression, no mood changes  SKIN: No itching      MEDICATIONS  (STANDING):  epoetin yolanda-epbx (RETACRIT) Injectable 62671 Unit(s) SubCutaneous <User Schedule>  heparin  Infusion 1500 Unit(s)/Hr (16 mL/Hr) IV Continuous <Continuous>  lactobacillus acidophilus 1 Tablet(s) Oral three times a day  levETIRAcetam  IVPB 750 milliGRAM(s) IV Intermittent <User Schedule>  metoprolol succinate ER 25 milliGRAM(s) Oral daily  pantoprazole    Tablet 40 milliGRAM(s) Oral before breakfast  potassium phosphate / sodium phosphate Powder (PHOS-NaK) 1 Packet(s) Oral two times a day  tamsulosin 0.4 milliGRAM(s) Oral at bedtime    MEDICATIONS  (PRN):  HYDROmorphone  Injectable 0.5 milliGRAM(s) IV Push every 3 hours PRN Moderate Pain (4 - 6)  HYDROmorphone  Injectable 0.5 milliGRAM(s) IV Push every 4 hours PRN break through pain  HYDROmorphone  Injectable 1 milliGRAM(s) IV Push every 3 hours PRN Severe Pain (7 - 10)      Allergies    penicillin (Other)    Intolerances        SOCIAL HISTORY: No illicit drug use // smoking  : [ ] yes [ ] no //  : [ ] yes [ ] no // ETOH :  [ ] yes [ ] no    FAMILY HISTORY:  FH: hypertension    Family history of cardiac disorder (Mother)  mother        Vital Signs Last 24 Hrs  T(C): 36.7 (07 Nov 2021 08:20), Max: 36.7 (06 Nov 2021 23:44)  T(F): 98.1 (07 Nov 2021 08:20), Max: 98.1 (07 Nov 2021 05:24)  HR: 79 (07 Nov 2021 13:24) (76 - 90)  BP: 143/78 (07 Nov 2021 13:24) (121/70 - 172/76)  BP(mean): --  RR: 18 (07 Nov 2021 08:20) (16 - 18)  SpO2: 97% (07 Nov 2021 08:20) (96% - 98%)   [ ] yes [ ] no  PHYSICAL EXAM:    Constitutional: NAD, well-developed  HEENT: EOMI  Neck: no pain  Back: No CVA tenderness  Respiratory: No accessory respiratory muscle use  Abd: Soft, large well healing midline incision, sutures in place.  Tender to palp mirna-incision  : currently voiding in bedside commode   Extremities: no edema  Neurological: A/O x 3  Psychiatric: Normal mood, normal affect  Skin: No rashes    I&O's Summary    06 Nov 2021 08:01  -  07 Nov 2021 07:00  --------------------------------------------------------  IN: 0 mL / OUT: 995 mL / NET: -995 mL        LABS:                        8.2    13.01 )-----------( 363      ( 07 Nov 2021 07:34 )             25.8     11-07    136  |  103  |  20.3<H>  ----------------------------<  107<H>  3.9   |  21.0<L>  |  1.04    Ca    7.3<L>      07 Nov 2021 07:34  Phos  2.3     11-07  Mg     2.1     11-07      PT/INR - ( 06 Nov 2021 07:47 )   PT: 16.7 sec;   INR: 1.47 ratio         PTT - ( 07 Nov 2021 07:34 )  PTT:38.1 sec    Urine Culture:         RADIOLOGY & ADDITIONAL STUDIES:  CT abdomen and pelvis with Cont   LIVER: Within normal limits.  BILE DUCTS: Normal caliber.  GALLBLADDER: Cholelithiasis.  SPLEEN: Within normal limits.  PANCREAS: Within normal limits.  ADRENALS: Within normal limits.  KIDNEYS/URETERS: Right renal cyst.    BLADDER: Collapsed around a Painting catheter balloon.  REPRODUCTIVE ORGANS: Mildly enlarged prostate.

## 2021-11-07 NOTE — CONSULT NOTE ADULT - ASSESSMENT
A/P: 61 year old male patient with a known history of psoriasis, former cocaine abuse (quit 2010), Aortic dissection s/p emergent repair (2010), surgery complicated by CVA w/ residual left sided weakness and bowel ischemia s/p bowel resection and ostomy with reversal repair, Covid infection, ESRD on HD (Tu/Thur/Sat) s/p LUE AVF, HTN, seizure disorder, GIB s/p colonoscopy and AVMs cauterization 3/2021 and CAD s/p remote stent to LAD and recent CABG x 2 (DGV-OM, RPDA with Dr. Butler 11/2020), AI and MR s/p tAVR, tMVR (11/2020 Dr. Butler) with post-op pAfib/flutter (on Eliquis HFrEF (EF 45-50% 02/21), and s/p uncomplicated radiofrequency ablation atrial fibrillation and atrial flutter (WACA/PVI, CTI and mitral flutter) on 9/14/21. Patient presents this admission with two days of constipation and worsening abdominal discomfort associated with nausea and vomiting. He reports these symptoms started suddenly. He admits that after meals he wasn't able to keep them down and would throw up his pills as well for these last two days. He has a history of these types of events since his abdominal surgery which are self limiting. Late last night he does admit to brief palpitations. No complaints of chest pain, dyspnea, fever or chills.    Appreciate above by Dr. Virk.     Patient is now possibly going to the OR for small bowel obstruction. Patient currently still nauseous with emesis and abdominal pain despite having NG tube. Patient states he had been taking all of his medications as prescribed, and has had no chest pain or dyspnea. Patient denies fevers, chills, CP, SOB, headache, or dizziness.     Natali-operative Cardiac Risk Stratification   - RCRI 11% risk of major cardiac event.   - Patient with multiple cardiac issues and comorbidities that place patient at elevated risk for any surgeries.   - No s/s of ACS or decompensated CHF.   - Increased ventricular ectopy on telemetry.   - Continue IV beta blockers.   - At this time benefits of surgery outweigh the risks, no absolute cardiac contraindications.     CAD  - Restart aspirin and statin when cleared by GI.     Atrial Fibrillation   - EP following.   - Recommendations for AC per EP note.   - Continue IV metoprolol.   - Continue telemetry monitoring.     Assessment and recommendations are final when note is signed by the attending.     
ASSESSMENT:  61y Male POD 0 from exploratory laparotomy and 4cm of small bowel resected for necrosis from thick adhesion. Patient required vasopressors through procedure that were discontinued after extubation. Patient has extensive cardiac history. Not in acute distress.     PLAN:   Neurologic: Treat pain with Tylenol, Toradol, and Dilaudid. Monitor mental status. On home dose of Keppra for seizures.   Respiratory: On room air. Monitor RR and Saturations.   Cardiovascular: f/u EP and Cardiology. Home antihypertensives held until BP is better controlled. F/u with EP vs cardiology for possible MUSTAPHA this admission.   Gastrointestinal/Nutrition: NPO with sips and ice. Monitor bowel function. On IV Protonix for hx of GIB.   Renal/Genitourinary: f/u nephrology. Last HD 10/25. Possible HD 10/27 vs 10/28 per nephrology. Monitor U/O. Possible D/C painting 10/26.  Hematologic: Hgb stable. Start  Heparin drip 10/27 AM for goal of transitioning to home Eliquis in the next couple of days. Trend H/H.  Infectious Disease: Clindamycin until 10/28 for surgical prophylaxis.   Lines/Tubes: PIV and Painting catheter  Endocrine: None  Disposition: SICU. Possible Downgrade tomorrow 10/27
1) ESRD on HD  2) MBD of renal dx  3) Anemia of renal dx  4) Vol HTN    HD in AM   Labs stable  Will follow    dw surgical team
61y Male POD 1 from exploratory laparotomy and mesenteric hematoma evacuation. Patient could not be extubated after the case and required SICU admission. Patient was extubated at 4AM which he has been tolerating.       PLAN:   Neurologic: Treat pain. Monitor mental status. On home dose of Keppra for seizures.   Respiratory: On Nonrebreather mask. Monitor RR and Saturations.   Cardiovascular: f/u Cardiology. Home antihypertensives held until BP is better controlled. F/u with EP vs cardiology for possible MUSTAPHA this admission. Monitor BP and HR  Gastrointestinal/Nutrition: NPO. Monitor bowel function. On IV Protonix for hx of GIB.   Renal/Genitourinary: f/u nephrology. Possible HD today  Hematologic: Hgb stable. State start date for heparin drip.  Infectious Disease: none. f/u Or cultures 10/31  Lines/Tubes: PIV and Chapman catheter  Endocrine: None  Disposition: SICU.    
A/P 62 y/o male with failed TOV, currently voiding spontaneously on bedside commode.  Mildly enlarged prostate on imaging.  Normal Cr.  -No acute urological intervention needed at this time  -would encourage OOB, walking with PT  -continue flomax 0.4mg/day  -if Pt. goes back into retention, would place painting and leave in when discharged for bladder rest  -follow up with urologist as an outpatient  d/w Dr. Quesada

## 2021-11-07 NOTE — PROGRESS NOTE ADULT - ASSESSMENT
Patient with past medical/surgical history of colectomy , colostomy and colostomy reversal, previous small bowel obstruction and  small bowel resection  is POD# 7 s/p exploratory laparotomy, drainage of 3 mesenteric hematomas and abscess of abdominal wall, he has been tolerating his clear liquid and full liquid diet, no nausea, no emesis, no abdominal pain, no abdominal distension, but he is having clusters of  watery diarrheas  # 6 during the day, white cell count is trending down   he fail his trial of void and was straight cath.  Plan   -Bladder scan 7 hours after straight cath  -Advance diet to low residue  -continue pain management   -continue wound vac  -Encourage incentive spirometer   -Monitor clusters of diarrhea  Patient with past medical/surgical history of colectomy , colostomy and colostomy reversal, previous small bowel obstruction and  small bowel resection  is POD# 7 s/p exploratory laparotomy, drainage of 3 mesenteric hematomas and abscess of abdominal wall, he has been tolerating his clear liquid and full liquid diet, no nausea, no emesis, no abdominal pain, no abdominal distension, but he is having clusters of  watery diarrheas  # 6 during the day, white cell count is trending down   he fail his trial of void and was straight cath.  Plan   -Bladder scan 7 hours after straight cath  -Advance diet to low residue  -continue pain management   -continue wound vac  -Encourage incentive spirometer   -Patient does not require hemodialysis as per nephrology   -Monitor clusters of diarrhea

## 2021-11-07 NOTE — CONSULT NOTE ADULT - CONSULT REQUESTED DATE/TIME
07-Nov-2021 11:05
23-Oct-2021 12:59
26-Oct-2021 17:52
01-Nov-2021 06:17
25-Oct-2021 12:54
24-Oct-2021 12:13

## 2021-11-08 ENCOUNTER — TRANSCRIPTION ENCOUNTER (OUTPATIENT)
Age: 61
End: 2021-11-08

## 2021-11-08 LAB
ANION GAP SERPL CALC-SCNC: 14 MMOL/L — SIGNIFICANT CHANGE UP (ref 5–17)
BUN SERPL-MCNC: 15.5 MG/DL — SIGNIFICANT CHANGE UP (ref 8–20)
CALCIUM SERPL-MCNC: 7.8 MG/DL — LOW (ref 8.6–10.2)
CHLORIDE SERPL-SCNC: 99 MMOL/L — SIGNIFICANT CHANGE UP (ref 98–107)
CO2 SERPL-SCNC: 23 MMOL/L — SIGNIFICANT CHANGE UP (ref 22–29)
CREAT SERPL-MCNC: 0.96 MG/DL — SIGNIFICANT CHANGE UP (ref 0.5–1.3)
GLUCOSE SERPL-MCNC: 95 MG/DL — SIGNIFICANT CHANGE UP (ref 70–99)
HCT VFR BLD CALC: 26.7 % — LOW (ref 39–50)
HGB BLD-MCNC: 8.4 G/DL — LOW (ref 13–17)
MAGNESIUM SERPL-MCNC: 2 MG/DL — SIGNIFICANT CHANGE UP (ref 1.6–2.6)
MCHC RBC-ENTMCNC: 29.3 PG — SIGNIFICANT CHANGE UP (ref 27–34)
MCHC RBC-ENTMCNC: 31.5 GM/DL — LOW (ref 32–36)
MCV RBC AUTO: 93 FL — SIGNIFICANT CHANGE UP (ref 80–100)
PHOSPHATE SERPL-MCNC: 2.5 MG/DL — SIGNIFICANT CHANGE UP (ref 2.4–4.7)
PLATELET # BLD AUTO: 377 K/UL — SIGNIFICANT CHANGE UP (ref 150–400)
POTASSIUM SERPL-MCNC: 4 MMOL/L — SIGNIFICANT CHANGE UP (ref 3.5–5.3)
POTASSIUM SERPL-SCNC: 4 MMOL/L — SIGNIFICANT CHANGE UP (ref 3.5–5.3)
RBC # BLD: 2.87 M/UL — LOW (ref 4.2–5.8)
RBC # FLD: 15.4 % — HIGH (ref 10.3–14.5)
SARS-COV-2 RNA SPEC QL NAA+PROBE: SIGNIFICANT CHANGE UP
SODIUM SERPL-SCNC: 136 MMOL/L — SIGNIFICANT CHANGE UP (ref 135–145)
WBC # BLD: 14.35 K/UL — HIGH (ref 3.8–10.5)
WBC # FLD AUTO: 14.35 K/UL — HIGH (ref 3.8–10.5)

## 2021-11-08 PROCEDURE — 99024 POSTOP FOLLOW-UP VISIT: CPT

## 2021-11-08 RX ORDER — ACETAMINOPHEN 500 MG
1000 TABLET ORAL ONCE
Refills: 0 | Status: DISCONTINUED | OUTPATIENT
Start: 2021-11-08 | End: 2021-11-08

## 2021-11-08 RX ORDER — TAMSULOSIN HYDROCHLORIDE 0.4 MG/1
1 CAPSULE ORAL
Qty: 0 | Refills: 0 | DISCHARGE
Start: 2021-11-08

## 2021-11-08 RX ORDER — ACETAMINOPHEN 500 MG
650 TABLET ORAL EVERY 6 HOURS
Refills: 0 | Status: DISCONTINUED | OUTPATIENT
Start: 2021-11-08 | End: 2021-11-09

## 2021-11-08 RX ORDER — TRAMADOL HYDROCHLORIDE 50 MG/1
1 TABLET ORAL
Qty: 0 | Refills: 0 | DISCHARGE
Start: 2021-11-08

## 2021-11-08 RX ORDER — ERYTHROPOIETIN 10000 [IU]/ML
10000 INJECTION, SOLUTION INTRAVENOUS; SUBCUTANEOUS
Qty: 0 | Refills: 0 | DISCHARGE
Start: 2021-11-08

## 2021-11-08 RX ORDER — TRAMADOL HYDROCHLORIDE 50 MG/1
50 TABLET ORAL EVERY 6 HOURS
Refills: 0 | Status: DISCONTINUED | OUTPATIENT
Start: 2021-11-08 | End: 2021-11-10

## 2021-11-08 RX ORDER — TRAMADOL HYDROCHLORIDE 50 MG/1
0.5 TABLET ORAL
Qty: 0 | Refills: 0 | DISCHARGE
Start: 2021-11-08

## 2021-11-08 RX ORDER — HYDROMORPHONE HYDROCHLORIDE 2 MG/ML
0.5 INJECTION INTRAMUSCULAR; INTRAVENOUS; SUBCUTANEOUS ONCE
Refills: 0 | Status: DISCONTINUED | OUTPATIENT
Start: 2021-11-08 | End: 2021-11-08

## 2021-11-08 RX ORDER — LACTOBACILLUS ACIDOPHILUS 100MM CELL
1 CAPSULE ORAL
Qty: 0 | Refills: 0 | DISCHARGE
Start: 2021-11-08

## 2021-11-08 RX ORDER — LEVETIRACETAM 250 MG/1
750 TABLET, FILM COATED ORAL DAILY
Refills: 0 | Status: DISCONTINUED | OUTPATIENT
Start: 2021-11-08 | End: 2021-11-10

## 2021-11-08 RX ORDER — ACETAMINOPHEN 500 MG
1000 TABLET ORAL ONCE
Refills: 0 | Status: COMPLETED | OUTPATIENT
Start: 2021-11-08 | End: 2021-11-08

## 2021-11-08 RX ORDER — PANTOPRAZOLE SODIUM 20 MG/1
1 TABLET, DELAYED RELEASE ORAL
Qty: 0 | Refills: 0 | DISCHARGE
Start: 2021-11-08

## 2021-11-08 RX ORDER — TRAMADOL HYDROCHLORIDE 50 MG/1
25 TABLET ORAL EVERY 4 HOURS
Refills: 0 | Status: DISCONTINUED | OUTPATIENT
Start: 2021-11-08 | End: 2021-11-10

## 2021-11-08 RX ORDER — ACETAMINOPHEN 500 MG
2 TABLET ORAL
Qty: 0 | Refills: 0 | DISCHARGE
Start: 2021-11-08

## 2021-11-08 RX ADMIN — LEVETIRACETAM 750 MILLIGRAM(S): 250 TABLET, FILM COATED ORAL at 17:28

## 2021-11-08 RX ADMIN — Medication 1 TABLET(S): at 12:53

## 2021-11-08 RX ADMIN — ERYTHROPOIETIN 10000 UNIT(S): 10000 INJECTION, SOLUTION INTRAVENOUS; SUBCUTANEOUS at 13:12

## 2021-11-08 RX ADMIN — Medication 1 TABLET(S): at 06:11

## 2021-11-08 RX ADMIN — TAMSULOSIN HYDROCHLORIDE 0.4 MILLIGRAM(S): 0.4 CAPSULE ORAL at 22:12

## 2021-11-08 RX ADMIN — Medication 1000 MILLIGRAM(S): at 22:46

## 2021-11-08 RX ADMIN — Medication 1 TABLET(S): at 22:11

## 2021-11-08 RX ADMIN — Medication 650 MILLIGRAM(S): at 12:20

## 2021-11-08 RX ADMIN — HYDROMORPHONE HYDROCHLORIDE 0.5 MILLIGRAM(S): 2 INJECTION INTRAMUSCULAR; INTRAVENOUS; SUBCUTANEOUS at 06:12

## 2021-11-08 RX ADMIN — HYDROMORPHONE HYDROCHLORIDE 0.5 MILLIGRAM(S): 2 INJECTION INTRAMUSCULAR; INTRAVENOUS; SUBCUTANEOUS at 08:35

## 2021-11-08 RX ADMIN — HYDROMORPHONE HYDROCHLORIDE 1 MILLIGRAM(S): 2 INJECTION INTRAMUSCULAR; INTRAVENOUS; SUBCUTANEOUS at 01:14

## 2021-11-08 RX ADMIN — HYDROMORPHONE HYDROCHLORIDE 1 MILLIGRAM(S): 2 INJECTION INTRAMUSCULAR; INTRAVENOUS; SUBCUTANEOUS at 00:24

## 2021-11-08 RX ADMIN — TRAMADOL HYDROCHLORIDE 50 MILLIGRAM(S): 50 TABLET ORAL at 13:50

## 2021-11-08 RX ADMIN — Medication 650 MILLIGRAM(S): at 11:21

## 2021-11-08 RX ADMIN — APIXABAN 5 MILLIGRAM(S): 2.5 TABLET, FILM COATED ORAL at 22:11

## 2021-11-08 RX ADMIN — Medication 1 PACKET(S): at 06:11

## 2021-11-08 RX ADMIN — HYDROMORPHONE HYDROCHLORIDE 1 MILLIGRAM(S): 2 INJECTION INTRAMUSCULAR; INTRAVENOUS; SUBCUTANEOUS at 04:25

## 2021-11-08 RX ADMIN — Medication 1 PACKET(S): at 17:28

## 2021-11-08 RX ADMIN — Medication 25 MILLIGRAM(S): at 06:11

## 2021-11-08 RX ADMIN — PANTOPRAZOLE SODIUM 40 MILLIGRAM(S): 20 TABLET, DELAYED RELEASE ORAL at 06:12

## 2021-11-08 RX ADMIN — TRAMADOL HYDROCHLORIDE 50 MILLIGRAM(S): 50 TABLET ORAL at 21:10

## 2021-11-08 RX ADMIN — TRAMADOL HYDROCHLORIDE 50 MILLIGRAM(S): 50 TABLET ORAL at 12:53

## 2021-11-08 RX ADMIN — APIXABAN 5 MILLIGRAM(S): 2.5 TABLET, FILM COATED ORAL at 08:01

## 2021-11-08 RX ADMIN — HYDROMORPHONE HYDROCHLORIDE 0.5 MILLIGRAM(S): 2 INJECTION INTRAMUSCULAR; INTRAVENOUS; SUBCUTANEOUS at 08:05

## 2021-11-08 RX ADMIN — HYDROMORPHONE HYDROCHLORIDE 0.5 MILLIGRAM(S): 2 INJECTION INTRAMUSCULAR; INTRAVENOUS; SUBCUTANEOUS at 02:43

## 2021-11-08 RX ADMIN — TRAMADOL HYDROCHLORIDE 50 MILLIGRAM(S): 50 TABLET ORAL at 20:20

## 2021-11-08 RX ADMIN — Medication 400 MILLIGRAM(S): at 22:31

## 2021-11-08 NOTE — DISCHARGE NOTE PROVIDER - NSDCMRMEDTOKEN_GEN_ALL_CORE_FT
amLODIPine 5 mg oral tablet: 1 tab(s) orally once a day  aspirin 81 mg oral delayed release tablet: 1 tab(s) orally once a day  atorvastatin 40 mg oral tablet: 1 tab(s) orally once a day (at bedtime)  Eliquis 5 mg oral tablet: 1  orally once a day  levETIRAcetam 750 mg oral tablet: 1 tab(s) orally once a day  losartan 25 mg oral tablet: 1 tab(s) orally once a day  metoprolol succinate 25 mg oral tablet, extended release: 1 tab(s) orally once a day  sevelamer hydrochloride 800 mg oral tablet: 1 tab(s) orally 2 times a day (with meals)  torsemide 20 mg oral tablet: 1 tab(s) orally once a day   acetaminophen 325 mg oral tablet: 2 tab(s) orally every 6 hours, As needed, Mild Pain (1 - 3)  amLODIPine 5 mg oral tablet: 1 tab(s) orally once a day  atorvastatin 40 mg oral tablet: 1 tab(s) orally once a day (at bedtime)  Eliquis 5 mg oral tablet: 1  orally once a day  epoetin yolanda: 74665 unit(s) intramuscularly once a week  lactobacillus acidophilus oral capsule: 1 cap(s) orally once a day  levETIRAcetam 750 mg oral tablet: 1 tab(s) orally once a day  losartan 25 mg oral tablet: 1 tab(s) orally once a day  metoprolol succinate 25 mg oral tablet, extended release: 1 tab(s) orally once a day  pantoprazole 40 mg oral delayed release tablet: 1 tab(s) orally once a day (before a meal)  sevelamer hydrochloride 800 mg oral tablet: 1 tab(s) orally 2 times a day (with meals)  tamsulosin 0.4 mg oral capsule: 1 cap(s) orally once a day (at bedtime)  torsemide 20 mg oral tablet: 1 tab(s) orally once a day   acetaminophen 325 mg oral tablet: 2 tab(s) orally every 6 hours, As needed, Mild Pain (1 - 3)  amLODIPine 5 mg oral tablet: 1 tab(s) orally once a day  atorvastatin 40 mg oral tablet: 1 tab(s) orally once a day (at bedtime)  Eliquis 5 mg oral tablet: 1  orally once a day  epoetin yolanda: 97514 unit(s) injectable Monday, Wednesday, and Friday  lactobacillus acidophilus oral capsule: 1 cap(s) orally once a day  levETIRAcetam 750 mg oral tablet: 1 tab(s) orally once a day  losartan 25 mg oral tablet: 1 tab(s) orally once a day  metoprolol succinate 25 mg oral tablet, extended release: 1 tab(s) orally once a day  pantoprazole 40 mg oral delayed release tablet: 1 tab(s) orally once a day (before a meal)  sevelamer hydrochloride 800 mg oral tablet: 1 tab(s) orally 2 times a day (with meals)  tamsulosin 0.4 mg oral capsule: 1 cap(s) orally once a day (at bedtime)  torsemide 20 mg oral tablet: 1 tab(s) orally once a day   acetaminophen 325 mg oral tablet: 2 tab(s) orally every 6 hours, As needed, Mild Pain (1 - 3)  amLODIPine 5 mg oral tablet: 1 tab(s) orally once a day  atorvastatin 40 mg oral tablet: 1 tab(s) orally once a day (at bedtime)  Eliquis 5 mg oral tablet: 1  orally once a day  epoetin yolanda: 61390 unit(s) injectable Monday, Wednesday, and Friday  lactobacillus acidophilus oral capsule: 1 cap(s) orally once a day  levETIRAcetam 750 mg oral tablet: 1 tab(s) orally once a day  losartan 25 mg oral tablet: 1 tab(s) orally once a day  metoprolol succinate 25 mg oral tablet, extended release: 1 tab(s) orally once a day  pantoprazole 40 mg oral delayed release tablet: 1 tab(s) orally once a day (before a meal)  sevelamer hydrochloride 800 mg oral tablet: 1 tab(s) orally 2 times a day (with meals)  tamsulosin 0.4 mg oral capsule: 1 cap(s) orally once a day (at bedtime)  torsemide 20 mg oral tablet: 1 tab(s) orally once a day  traMADol 50 mg oral tablet: 0.5 tab(s) orally every 4 hours, As needed, Moderate Pain (4 - 6)  traMADol 50 mg oral tablet: 1 tab(s) orally every 6 hours, As needed, Severe Pain (7 - 10)

## 2021-11-08 NOTE — DISCHARGE NOTE PROVIDER - NSDCFUSCHEDAPPT_GEN_ALL_CORE_FT
JONATHAN GIBSON ; 11/18/2021 ; [facility] [address]   JONATHAN GIBSON ; 12/02/2021 ; [facility] [address] ; 01/10/2022 ; [facility]

## 2021-11-08 NOTE — DISCHARGE NOTE PROVIDER - NSDCACTIVITY_GEN_ALL_CORE
Do not drive or operate machinery/Walking - Indoors allowed/No heavy lifting/straining/Walking - Outdoors allowed/Follow Instructions Provided by your Surgical Team

## 2021-11-08 NOTE — DISCHARGE NOTE NURSING/CASE MANAGEMENT/SOCIAL WORK - NSDCFUADDAPPT_GEN_ALL_CORE_FT
Please call [phone] to make an appointment with ACS team in one week.     Please call your nephrologist and follow-up in one week post-discharge to discuss hemodialysis.     Please call your cardiologist and follow-up in one-two weeks post-discharge.   Continue eliquis.

## 2021-11-08 NOTE — PROGRESS NOTE PEDS - NUTRITIONAL ASSESSMENT
This patient has been assessed with a concern for Malnutrition and has been determined to have a diagnosis/diagnoses of Severe protein-calorie malnutrition.    This patient is being managed with:   Diet Low Fiber-  Entered: Nov 7 2021 12:27AM

## 2021-11-08 NOTE — PROGRESS NOTE ADULT - ASSESSMENT
60 yo male with urinary retention, recent ex-lap, no longer on HD  - pt encouraged to void before he feels very full  - oob to chair, ambulate  - cont bladder scans for PVR's  - instructed pt if he retains and is unable to void adequately, he will require a painting

## 2021-11-08 NOTE — DISCHARGE NOTE PROVIDER - CARE PROVIDERS DIRECT ADDRESSES
,DirectAddress_Unknown ,DirectAddress_Unknown,[email or web address] ,[email or web address]

## 2021-11-08 NOTE — DISCHARGE NOTE PROVIDER - NSDCFUADDAPPT_GEN_ALL_CORE_FT
Please call your nephrologist and follow-up in one week post-discharge to discuss hemodialysis.     Please call your cardiologist and follow-up in one-two weeks post-discharge.      Please call [phone] to make an appointment with ACS team in one week.     Please call your nephrologist and follow-up in one week post-discharge to discuss hemodialysis.     Please call your cardiologist and follow-up in one-two weeks post-discharge.   Continue eliquis.

## 2021-11-08 NOTE — DISCHARGE NOTE PROVIDER - PROVIDER TOKENS
PROVIDER:[TOKEN:[[id number]],FOLLOWUP:[1 week]] PROVIDER:[TOKEN:[[id number]],FOLLOWUP:[1 week]],PROVIDER:[TOKEN:[[id number]],FOLLOWUP:[1 week]] PROVIDER:[TOKEN:[[id number]],FOLLOWUP:[1 week]]

## 2021-11-08 NOTE — PROGRESS NOTE PEDS - SUBJECTIVE AND OBJECTIVE BOX
HPI/OVERNIGHT EVENTS: Patient seen and examined at bedside this AM. transitioned to eliquis, voided spontaneously,will assess PVRs, otherwise doing well    Vital Signs Last 24 Hrs  T(C): 37.2 (07 Nov 2021 21:44), Max: 37.2 (07 Nov 2021 21:44)  T(F): 99 (07 Nov 2021 21:44), Max: 99 (07 Nov 2021 21:44)  HR: 85 (07 Nov 2021 21:44) (76 - 85)  BP: 153/73 (07 Nov 2021 21:44) (121/70 - 168/72)  BP(mean): --  RR: 18 (07 Nov 2021 21:44) (16 - 18)  SpO2: 97% (07 Nov 2021 21:44) (96% - 98%)    I&O's Detail    06 Nov 2021 08:01  -  07 Nov 2021 07:00  --------------------------------------------------------  IN:  Total IN: 0 mL    OUT:    Indwelling Catheter - Urethral (mL): 325 mL    Intermittent Catheterization - Urethral (mL): 670 mL  Total OUT: 995 mL    Total NET: -995 mL      Physical Exam:   GEN: patient resting comfortably in bed, in no acute distress,   RESP: respirations are unlabored, no accessory muscle use, no conversational dyspnea  CVS: RRR  GI: Abdomen soft, non-tender, non-distended, no rebound tenderness / guarding, there is a midline wound vacc with good seal at 125 mm Hg and multiple scars in his abdomen  extremities no edema        LABS:                        8.2    13.01 )-----------( 363      ( 07 Nov 2021 07:34 )             25.8     11-07    136  |  103  |  20.3<H>  ----------------------------<  107<H>  3.9   |  21.0<L>  |  1.04    Ca    7.3<L>      07 Nov 2021 07:34  Phos  2.3     11-07  Mg     2.1     11-07      PT/INR - ( 06 Nov 2021 07:47 )   PT: 16.7 sec;   INR: 1.47 ratio         PTT - ( 07 Nov 2021 16:02 )  PTT:37.3 sec      MEDICATIONS  (STANDING):  apixaban 5 milliGRAM(s) Oral every 12 hours  epoetin yolanda-epbx (RETACRIT) Injectable 25556 Unit(s) SubCutaneous <User Schedule>  heparin  Infusion 18 Unit(s)/Hr (0.18 mL/Hr) IV Continuous <Continuous>  lactobacillus acidophilus 1 Tablet(s) Oral three times a day  levETIRAcetam  IVPB 750 milliGRAM(s) IV Intermittent <User Schedule>  metoprolol succinate ER 25 milliGRAM(s) Oral daily  pantoprazole    Tablet 40 milliGRAM(s) Oral before breakfast  potassium phosphate / sodium phosphate Powder (PHOS-NaK) 1 Packet(s) Oral two times a day  tamsulosin 0.4 milliGRAM(s) Oral at bedtime    MEDICATIONS  (PRN):  HYDROmorphone  Injectable 0.5 milliGRAM(s) IV Push every 3 hours PRN Moderate Pain (4 - 6)  HYDROmorphone  Injectable 0.5 milliGRAM(s) IV Push every 4 hours PRN break through pain  HYDROmorphone  Injectable 1 milliGRAM(s) IV Push every 3 hours PRN Severe Pain (7 - 10)      MICRO:   Cultures     STUDIES:   EKG, CXR, U/S, CT, MRI

## 2021-11-08 NOTE — DISCHARGE NOTE PROVIDER - NSDCCPCAREPLAN_GEN_ALL_CORE_FT
PRINCIPAL DISCHARGE DIAGNOSIS  Diagnosis: Abdominal pain with vomiting  Assessment and Plan of Treatment: Patient is s/p ex lapatoromy due to mesentaric ischemia and abscess in abdominal wall.    Please keep wound vacuum to wound for 3-4 days and change as needed per Surgery team.  Please return to ED if you have a fever greater than 100.4F, shortness of breath, excessive pain, nausea and vomiting.      SECONDARY DISCHARGE DIAGNOSES  Diagnosis: Constipation  Assessment and Plan of Treatment:      PRINCIPAL DISCHARGE DIAGNOSIS  Diagnosis: Abdominal pain with vomiting  Assessment and Plan of Treatment: Mesenteric hematoma and abdminal abscess.  Patient is s/p ex lapatoromy due to mesentaric ischemia and abscess in abdominal wall.  Please take over the counter pain medications as needed,  Please keep wound vacuum to wound for 3-4 days and change as needed per Surgery team.    WOUND:  Measurement:  Inner wound has mepilex material cut to fit snuggly in wound.  Vacuum foam placed overtop.  Seal with one clear adhesive tape that will cover wound in its entirety and ensure no air leak.  Cut a quart-sized hole on adhesive, midline in the middle of foam, and attach suction adhesive to site.  Attach to wound vacuum machine and ensure no air leak.  Change 3-4 days.  Monitor wound site.    ----  You may shower as normal, but please keep dressing to midline.  Esnure no air leaks to site.  If sponge bath needed, that would be appropriate however do not submerge in water until follow-up by Surgeon.  Yoy may resume Renal Diet as tolerated.  You may be up and ambulating with physical therapy and assistance.    ----  Please return to ED if you have a fever greater than 100.4F, shortness of breath, excessive pain, nausea and vomiting.      SECONDARY DISCHARGE DIAGNOSES  Diagnosis: Constipation  Assessment and Plan of Treatment:      PRINCIPAL DISCHARGE DIAGNOSIS  Diagnosis: Abdominal pain with vomiting  Assessment and Plan of Treatment: Mesenteric hematoma and abdminal abscess.  Patient is s/p ex lapatoromy due to mesentaric ischemia and abscess in abdominal wall.  Please take over the counter pain medications as needed,  Please keep wound vacuum to wound for 3-4 days and change as needed per Surgery team.    WOUND:  Measurement: 27cm x 5cm  Inner wound has mepilex material cut to fit snuggly in wound.  Vacuum foam placed overtop.  Seal with one clear adhesive tape that will cover wound in its entirety and ensure no air leak.  Cut a quart-sized hole on adhesive, midline in the middle of foam, and attach suction adhesive to site.  Attach to wound vacuum machine and ensure no air leak.  Change 3-4 days.  Monitor wound site.    ----  You may shower as normal, but please keep dressing to midline.  Esnure no air leaks to site.  If sponge bath needed, that would be appropriate however do not submerge in water until follow-up by Surgeon.  Yoy may resume Renal Diet as tolerated.  You may be up and ambulating with physical therapy and assistance.    ----  Please return to ED if you have a fever greater than 100.4F, shortness of breath, excessive pain, nausea and vomiting.      SECONDARY DISCHARGE DIAGNOSES  Diagnosis: Constipation  Assessment and Plan of Treatment:      PRINCIPAL DISCHARGE DIAGNOSIS  Diagnosis: Abdominal pain with vomiting  Assessment and Plan of Treatment: Mesenteric hematoma and abdminal abscess.  Follow Up: please call and make an appointment with [facility] in 10-14 days  Patient is s/p ex lapatoromy due to mesentaric ischemia and abscess in abdominal wall.  Please take over the counter pain medications as needed,  Please keep wound vacuum to wound for 3-4 days and change as needed per Surgery team.    WOUND:  Measurement: 27cm x 5cm  Inner wound has mepilex material cut to fit snuggly in wound.  Vacuum foam placed overtop.  Seal with one clear adhesive tape that will cover wound in its entirety and ensure no air leak.  Cut a quart-sized hole on adhesive, midline in the middle of foam, and attach suction adhesive to site.  Attach to wound vacuum machine and ensure no air leak.  Change 3-4 days.  Monitor wound site.    ----  You may shower as normal, but please keep dressing to midline.  Esnure no air leaks to site.  If sponge bath needed, that would be appropriate however do not submerge in water until follow-up by Surgeon.  Yoy may resume Renal Diet as tolerated.  You may be up and ambulating with physical therapy and assistance.    ----  Please return to ED if you have a fever greater than 100.4F, shortness of breath, excessive pain, nausea and vomiting.      SECONDARY DISCHARGE DIAGNOSES  Diagnosis: Constipation  Assessment and Plan of Treatment:

## 2021-11-08 NOTE — DISCHARGE NOTE PROVIDER - NSFOLLOWUPCLINICS_GEN_ALL_ED_FT
[facility] Acute Care Surgery  Acute Care Surgery  [address] [place] [address]  Phone: [phone]  Fax:

## 2021-11-08 NOTE — PROGRESS NOTE ADULT - SUBJECTIVE AND OBJECTIVE BOX
Subjective:61yMale with urinary retention, s/p recent ex-lap for SBO.  pt tends to hold urine, then void high volumes.  pt voided 710ml early this am, PVR 140ml.  pt resting relatively comfortable at this time.  Pt states he is able to void.        Vital Signs Last 24 Hrs  T(C): 36.4 (08 Nov 2021 08:02), Max: 37.6 (08 Nov 2021 06:09)  T(F): 97.6 (08 Nov 2021 08:02), Max: 99.6 (08 Nov 2021 06:09)  HR: 82 (08 Nov 2021 08:02) (78 - 85)  BP: 153/71 (08 Nov 2021 08:02) (143/78 - 168/72)  BP(mean): --  RR: 18 (08 Nov 2021 08:02) (18 - 18)  SpO2: 95% (08 Nov 2021 08:02) (95% - 98%)  I&O's Detail    07 Nov 2021 07:01  -  08 Nov 2021 07:00  --------------------------------------------------------  IN:  Total IN: 0 mL    OUT:    Voided (mL): 710 mL  Total OUT: 710 mL    Total NET: -710 mL          Labs:                        8.4    14.35 )-----------( 377      ( 08 Nov 2021 10:18 )             26.7     11-07    136  |  103  |  20.3<H>  ----------------------------<  107<H>  3.9   |  21.0<L>  |  1.04    Ca    7.3<L>      07 Nov 2021 07:34  Phos  2.3     11-07  Mg     2.1     11-07      PTT - ( 07 Nov 2021 16:02 )  PTT:37.3 sec

## 2021-11-08 NOTE — DISCHARGE NOTE PROVIDER - DETAILS OF MALNUTRITION DIAGNOSIS/DIAGNOSES
This patient has been assessed with a concern for Malnutrition and was treated during this hospitalization for the following Nutrition diagnosis/diagnoses:     -  10/27/2021: Severe protein-calorie malnutrition

## 2021-11-08 NOTE — DISCHARGE NOTE PROVIDER - CARE PROVIDER_API CALL
Kalin Hennessy)  Surgery; Surgical Critical Care  [address] [place] [address]  Phone: [phone]  Fax: [phone]  Follow Up Time: 1 week   Kalin Hennessy)  Surgery; Surgical Critical Care  [address] [place] [address]  Phone: [phone]  Fax: [phone]  Follow Up Time: 1 week    Lazaro Conway ()  Internal Medicine  [address] [place] [address], [address] [place] [address]  Phone: [phone]  Fax: [phone]  Follow Up Time: 1 week   Lazaro Conway (DO)  Internal Medicine  [address] [place] [address], [address]nd [place] [address]  Phone: [phone]  Fax: [phone]  Follow Up Time: 1 week

## 2021-11-08 NOTE — PROGRESS NOTE PEDS - ATTENDING COMMENTS
Patient seen and examined this am. Wound bed with some fibrinous exudate, clean and no sign of infection. Mepelex dressing placed under the wound vac. Awaiting dispo, stable for d/c. Tolerating diet. Pain controlled.

## 2021-11-08 NOTE — DISCHARGE NOTE NURSING/CASE MANAGEMENT/SOCIAL WORK - NSDCVIVACCINE_GEN_ALL_CORE_FT
influenza, injectable, quadrivalent, preservative free; 12-Dec-2020 09:28; Lynn Gerardo); [employer]; [id number] (Exp. Date: 30-Jun-2021); IntraMuscular; Deltoid Right.; 0.5 milliLiter(s); VIS (VIS Published: 15-Aug-2019, VIS Presented: 12-Dec-2020);

## 2021-11-08 NOTE — DISCHARGE NOTE PROVIDER - HOSPITAL COURSE
61y Male with MedHx of CHF, CVA, HTN, HLD, HLD, AFib, ESRD on HD and h/o aortic dissection presented to ED on 10/23/21 with abdominal pain. He had reported two days of constipation, and progressive abdominal pain, with associated n/v. Reported a history of bowel resection requiring an ostomy and reversal in the past and since that time he has had a BM with every meal.  CT of abdomen revealed a small bowel obstruction with transition point in the left mid-abdomen.      During patient's hospital course, patient was treated conservatively with NGT placement for GI decompression, however, patient failed to have SBO resolve and subsequently was planned for operative intervention.  On 10/26, patient had a laparoscopic converted to open ex laparotomy with small bowel resection.  Post-operatively patient went to SICU.  Hep  61y Male with MedHx of CHF, CVA, HTN, HLD, HLD, AFib, ESRD on HD and h/o aortic dissection presented to ED on 10/23/21 with abdominal pain. He had reported two days of constipation, and progressive abdominal pain, with associated n/v. Reported a history of bowel resection requiring an ostomy and reversal in the past and since that time he has had a BM with every meal.  CT of abdomen revealed a small bowel obstruction with transition point in the left mid-abdomen.      During patient's hospital course, patient was treated conservatively with NGT placement for GI decompression, however, patient failed to have SBO resolve and subsequently was planned for operative intervention.  On 10/26, patient had a laparoscopic converted to open ex laparotomy with small bowel resection.  Post-operatively patient went to SICU.  Heparin drip was started and with hemodynamic stability, patient transferred to floor.  Hemodialysis was started 10/26 with no issues.   Chapman was removed and replaced secondary to urinary retention.  Patient was working with Physical Therapy and slow to ambulation secondary to pain.  Patient continued to have no flatus or bowel movement on POD4.  Due to a low value of hemoglobin, patient returned to OR on 10/31 secondary to a mesenteric hematoma found  on imaging with abdominal wall abscess.  10/31 procedure was an exploratory laparotomy with lysis of adhesions, evacuation of hematoma, and wound vacuum placement. Intraoperatively patient received 2uPRBC, 1FFP, 1 platelets.  Postoperatively patient returned to SICU intubated.    POD 1 from second surgery, patient was extubated.  Hemodialysis was restarted and patient began PT once again.  On POD2, patient ws transferred to floor with hemodynamic stability.  He was restarted on a low dose heparin drip and abdomen was examined daily.  Patient's NGT was removed and on POD 4 patient reported +flatus.  Wound vacuum change revealed no evidence of infection and patient remained afebrile.      Rest of hospital course has been nonsignificant.  Patient is tolerating a renal diet.  He has been transitioned off from heparin to PO eliquis.  Plans for subacute rehab are in place secondary to wound vacuum assistance.  Patient does not require hemodialysis per nephrology any longer, however, he still needs to follow up with outpatient provider.      WOUND:  Measurement:  Inner wound has mepilex material cut to fit snuggly in wound.  Vacuum foam placed overtop.  Seal with one clear adhesive tape that will cover wound in its entirety and ensure no air leak.  Cut a quart-sized hole on adhesive, midline in the middle of foam, and attach suction adhesive to site.  Attach to wound vacuum machine and ensure no air leak.  Change 3-4 days.  Monitor wound site.  61y Male with MedHx of CHF, CVA, HTN, HLD, HLD, AFib, ESRD on HD and h/o aortic dissection presented to ED on 10/23/21 with abdominal pain. He had reported two days of constipation, and progressive abdominal pain, with associated n/v. Reported a history of bowel resection requiring an ostomy and reversal in the past and since that time he has had a BM with every meal.  CT of abdomen revealed a small bowel obstruction with transition point in the left mid-abdomen.      During patient's hospital course, patient was treated conservatively with NGT placement for GI decompression, however, patient failed to have SBO resolve and subsequently was planned for operative intervention.  On 10/26, patient had a laparoscopic converted to open ex laparotomy with small bowel resection.  Post-operatively patient went to SICU.  Heparin drip was started and with hemodynamic stability, patient transferred to floor.  Hemodialysis was started 10/26 with no issues.   Chapman was removed and replaced secondary to urinary retention.  Patient was working with Physical Therapy and slow to ambulation secondary to pain.  Patient continued to have no flatus or bowel movement on POD4.  Due to a low value of hemoglobin, patient returned to OR on 10/31 secondary to a mesenteric hematoma found  on imaging with abdominal wall abscess.  10/31 procedure was an exploratory laparotomy with lysis of adhesions, evacuation of hematoma, and wound vacuum placement. Intraoperatively patient received 2uPRBC, 1FFP, 1 platelets.  Postoperatively patient returned to SICU intubated.    POD 1 from second surgery, patient was extubated.  Hemodialysis was restarted and patient began PT once again.  On POD2, patient ws transferred to floor with hemodynamic stability.  He was restarted on a low dose heparin drip and abdomen was examined daily.  Patient's NGT was removed and on POD 4 patient reported +flatus.  Wound vacuum change revealed no evidence of infection and patient remained afebrile.      Rest of hospital course has been nonsignificant.  Patient is tolerating a renal diet.  He has been transitioned off from heparin to PO eliquis.  Plans for subacute rehab are in place secondary to wound vacuum assistance.  Patient does not require hemodialysis per nephrology any longer, however, he still needs to follow up with outpatient provider.      WOUND:  Measurement: 27cm x 5cm  Inner wound has mepilex material cut to fit snuggly in wound.  Vacuum foam placed overtop.  Seal with one clear adhesive tape that will cover wound in its entirety and ensure no air leak.  Cut a quart-sized hole on adhesive, midline in the middle of foam, and attach suction adhesive to site.  Attach to wound vacuum machine and ensure no air leak.  Change 3-4 days.  Monitor wound site.  61y Male with MedHx of CHF, CVA, HTN, HLD, HLD, AFib, ESRD on HD and h/o aortic dissection presented to ED on 10/23/21 with abdominal pain. He had reported two days of constipation, and progressive abdominal pain, with associated n/v. Reported a history of bowel resection requiring an ostomy and reversal in the past and since that time he has had a BM with every meal.  CT of abdomen revealed a small bowel obstruction with transition point in the left mid-abdomen.      During patient's hospital course, patient was treated conservatively with NGT placement for GI decompression, however, patient failed to have SBO resolve and subsequently was planned for operative intervention.  On 10/26, patient had a laparoscopic converted to open ex laparotomy with small bowel resection.  Post-operatively patient went to SICU.  Heparin drip was started and with hemodynamic stability, patient transferred to floor.  Hemodialysis was started 10/26 with no issues.   Chapman was removed and replaced secondary to urinary retention.  Patient was working with Physical Therapy and slow to ambulation secondary to pain.  Patient continued to have no flatus or bowel movement on POD4.  Due to a low value of hemoglobin, patient returned to OR on 10/31 secondary to a mesenteric hematoma found  on imaging with abdominal wall abscess.  10/31 procedure was an exploratory laparotomy with lysis of adhesions, evacuation of hematoma, and wound vacuum placement. Intraoperatively patient received 2uPRBC, 1FFP, 1 platelets.  Postoperatively patient returned to SICU intubated.    POD 1 from second surgery, patient was extubated.  Hemodialysis was restarted and patient began PT once again.  On POD2, patient ws transferred to floor with hemodynamic stability.  He was restarted on a low dose heparin drip and abdomen was examined daily.  Patient's NGT was removed and on POD 4 patient reported +flatus.  Wound vacuum change revealed no evidence of infection and patient remained afebrile.      Rest of hospital course has been nonsignificant.  Patient is tolerating a renal diet.  He has been transitioned off from heparin to PO eliquis.  Plans for subacute rehab are in place secondary to wound vacuum assistance.  Patient does not require hemodialysis per nephrology any longer, however, he still needs to follow up with outpatient provider.      WOUND:  Measurement: 27cm x 5cm x 1cm  Standard Wound Vac dressing with Black Foam, suction @ 125mmhg and change q3 days

## 2021-11-08 NOTE — PROGRESS NOTE PEDS - ASSESSMENT
Patient with past medical/surgical history of colectomy , colostomy and colostomy reversal, previous small bowel obstruction and  small bowel resection  is POD# 8 s/p exploratory laparotomy, drainage of 3 mesenteric hematomas and abscess of abdominal wall, he has been tolerating his diet, no nausea, no emesis, no abdominal pain, no abdominal distension.  Plan   -f/u for urinary retention  -continue pain management   -continue wound vac down 11/8  -Encourage incentive spirometer   -Patient does not require hemodialysis as per nephrology   -Monitor clusters of diarrhea

## 2021-11-08 NOTE — DISCHARGE NOTE NURSING/CASE MANAGEMENT/SOCIAL WORK - PATIENT PORTAL LINK FT
You can access the FollowMyHealth Patient Portal offered by [facility] by registering at the following website: [email or web address]. By joining [facility]’s FollowMyHealth portal, you will also be able to view your health information using other applications (apps) compatible with our system.

## 2021-11-09 LAB
ANION GAP SERPL CALC-SCNC: 15 MMOL/L — SIGNIFICANT CHANGE UP (ref 5–17)
BUN SERPL-MCNC: 16.5 MG/DL — SIGNIFICANT CHANGE UP (ref 8–20)
CALCIUM SERPL-MCNC: 7.9 MG/DL — LOW (ref 8.6–10.2)
CHLORIDE SERPL-SCNC: 101 MMOL/L — SIGNIFICANT CHANGE UP (ref 98–107)
CO2 SERPL-SCNC: 22 MMOL/L — SIGNIFICANT CHANGE UP (ref 22–29)
CREAT SERPL-MCNC: 1.14 MG/DL — SIGNIFICANT CHANGE UP (ref 0.5–1.3)
GLUCOSE SERPL-MCNC: 83 MG/DL — SIGNIFICANT CHANGE UP (ref 70–99)
HCT VFR BLD CALC: 26 % — LOW (ref 39–50)
HGB BLD-MCNC: 8.1 G/DL — LOW (ref 13–17)
MCHC RBC-ENTMCNC: 29.2 PG — SIGNIFICANT CHANGE UP (ref 27–34)
MCHC RBC-ENTMCNC: 31.2 GM/DL — LOW (ref 32–36)
MCV RBC AUTO: 93.9 FL — SIGNIFICANT CHANGE UP (ref 80–100)
PLATELET # BLD AUTO: 413 K/UL — HIGH (ref 150–400)
POTASSIUM SERPL-MCNC: 4.3 MMOL/L — SIGNIFICANT CHANGE UP (ref 3.5–5.3)
POTASSIUM SERPL-SCNC: 4.3 MMOL/L — SIGNIFICANT CHANGE UP (ref 3.5–5.3)
RBC # BLD: 2.77 M/UL — LOW (ref 4.2–5.8)
RBC # FLD: 15.5 % — HIGH (ref 10.3–14.5)
SODIUM SERPL-SCNC: 138 MMOL/L — SIGNIFICANT CHANGE UP (ref 135–145)
WBC # BLD: 12.75 K/UL — HIGH (ref 3.8–10.5)
WBC # FLD AUTO: 12.75 K/UL — HIGH (ref 3.8–10.5)

## 2021-11-09 PROCEDURE — 99233 SBSQ HOSP IP/OBS HIGH 50: CPT

## 2021-11-09 PROCEDURE — 99024 POSTOP FOLLOW-UP VISIT: CPT | Mod: GC

## 2021-11-09 RX ORDER — ACETAMINOPHEN 500 MG
975 TABLET ORAL EVERY 6 HOURS
Refills: 0 | Status: DISCONTINUED | OUTPATIENT
Start: 2021-11-09 | End: 2021-11-10

## 2021-11-09 RX ORDER — TRAMADOL HYDROCHLORIDE 50 MG/1
50 TABLET ORAL ONCE
Refills: 0 | Status: DISCONTINUED | OUTPATIENT
Start: 2021-11-09 | End: 2021-11-09

## 2021-11-09 RX ORDER — ACETAMINOPHEN 500 MG
1000 TABLET ORAL ONCE
Refills: 0 | Status: COMPLETED | OUTPATIENT
Start: 2021-11-09 | End: 2021-11-09

## 2021-11-09 RX ADMIN — APIXABAN 5 MILLIGRAM(S): 2.5 TABLET, FILM COATED ORAL at 21:58

## 2021-11-09 RX ADMIN — Medication 400 MILLIGRAM(S): at 10:54

## 2021-11-09 RX ADMIN — Medication 1 TABLET(S): at 14:30

## 2021-11-09 RX ADMIN — TRAMADOL HYDROCHLORIDE 50 MILLIGRAM(S): 50 TABLET ORAL at 04:30

## 2021-11-09 RX ADMIN — TRAMADOL HYDROCHLORIDE 50 MILLIGRAM(S): 50 TABLET ORAL at 18:40

## 2021-11-09 RX ADMIN — TRAMADOL HYDROCHLORIDE 50 MILLIGRAM(S): 50 TABLET ORAL at 14:42

## 2021-11-09 RX ADMIN — Medication 1 TABLET(S): at 06:22

## 2021-11-09 RX ADMIN — Medication 1 PACKET(S): at 06:23

## 2021-11-09 RX ADMIN — Medication 975 MILLIGRAM(S): at 17:42

## 2021-11-09 RX ADMIN — Medication 975 MILLIGRAM(S): at 18:40

## 2021-11-09 RX ADMIN — LEVETIRACETAM 750 MILLIGRAM(S): 250 TABLET, FILM COATED ORAL at 11:25

## 2021-11-09 RX ADMIN — TRAMADOL HYDROCHLORIDE 50 MILLIGRAM(S): 50 TABLET ORAL at 17:42

## 2021-11-09 RX ADMIN — PANTOPRAZOLE SODIUM 40 MILLIGRAM(S): 20 TABLET, DELAYED RELEASE ORAL at 06:23

## 2021-11-09 RX ADMIN — APIXABAN 5 MILLIGRAM(S): 2.5 TABLET, FILM COATED ORAL at 08:35

## 2021-11-09 RX ADMIN — Medication 1 PACKET(S): at 17:43

## 2021-11-09 RX ADMIN — Medication 1000 MILLIGRAM(S): at 11:35

## 2021-11-09 RX ADMIN — Medication 25 MILLIGRAM(S): at 06:22

## 2021-11-09 RX ADMIN — TRAMADOL HYDROCHLORIDE 50 MILLIGRAM(S): 50 TABLET ORAL at 09:33

## 2021-11-09 RX ADMIN — Medication 1 TABLET(S): at 21:58

## 2021-11-09 RX ADMIN — TAMSULOSIN HYDROCHLORIDE 0.4 MILLIGRAM(S): 0.4 CAPSULE ORAL at 21:58

## 2021-11-09 RX ADMIN — TRAMADOL HYDROCHLORIDE 50 MILLIGRAM(S): 50 TABLET ORAL at 15:30

## 2021-11-09 RX ADMIN — TRAMADOL HYDROCHLORIDE 50 MILLIGRAM(S): 50 TABLET ORAL at 10:30

## 2021-11-09 RX ADMIN — Medication 650 MILLIGRAM(S): at 06:28

## 2021-11-09 RX ADMIN — TRAMADOL HYDROCHLORIDE 50 MILLIGRAM(S): 50 TABLET ORAL at 03:48

## 2021-11-09 NOTE — PROGRESS NOTE ADULT - SUBJECTIVE AND OBJECTIVE BOX
Social work note: SW spoke with [place] admissions for an updated.    They will contact this worker back with an update.    ICU Vital Signs Last 24 Hrs,    T(C): 37.2 (09 Nov 2021 07:00), Max: 37.2 (09 Nov 2021 07:00)  T(F): 99 (09 Nov 2021 07:00), Max: 99 (09 Nov 2021 07:00)  HR: 80 (09 Nov 2021 07:00) (76 - 80)  BP: 129/68 (09 Nov 2021 07:00) (129/68 - 155/80)  RR: 18 (09 Nov 2021 07:00) (18 - 18)  SpO2: 94% (09 Nov 2021 07:00) (94% - 97%)    Creatinine, Serum: 1.14 mg/dL (11.09.21 @ 09:08)  Historical Values  Creatinine, Serum: 1.14 mg/dL (11.09.21 @ 09:08)   Creatinine, Serum: 0.96 mg/dL (11.08.21 @ 10:18)   Creatinine, Serum: 1.04 mg/dL (11.07.21 @ 07:34)   Creatinine, Serum: 1.07 mg/dL (11.06.21 @ 07:47)   Creatinine, Serum: 1.33 mg/dL (11.05.21 @ 03:06)   Creatinine, Serum: 1.23 mg/dL (11.04.21 @ 07:19)   Creatinine, Serum: 1.36 mg/dL (11.03.21 @ 07:19)   Creatinine, Serum: 1.42 mg/dL (11.02.21 @ 04:50)   Creatinine, Serum: 2.18 mg/dL (11.01.21 @ 04:35)   Creatinine, Serum: 2.11 mg/dL (11.01.21 @ 00:19)

## 2021-11-09 NOTE — PROGRESS NOTE ADULT - SUBJECTIVE AND OBJECTIVE BOX
SUBJECTIVE/24 hour events:  Patient is a 61yMale presenting with SBO now POD# 9 ex-lap with drainage of mesenteric hematoma and abscess in the abdominal wall. Patient with no acute events issues, pain controlled on current  regimen, tolerating a diet, voiding spontaneously, no longer requiring HD. Patient awaiting placement to rehab      Vital Signs Last 24 Hrs  T(C): 36.6 (08 Nov 2021 16:12), Max: 37.6 (08 Nov 2021 06:09)  T(F): 97.9 (08 Nov 2021 16:12), Max: 99.6 (08 Nov 2021 06:09)  HR: 76 (08 Nov 2021 16:12) (76 - 83)  BP: 155/80 (08 Nov 2021 16:12) (148/78 - 155/80)  BP(mean): --  RR: 18 (08 Nov 2021 16:12) (18 - 18)  SpO2: 97% (08 Nov 2021 16:12) (95% - 98%)  Drug Dosing Weight  Height (cm): 172.7 (01 Nov 2021 00:00)  Weight (kg): 84.6 (01 Nov 2021 00:00)  BMI (kg/m2): 28.4 (01 Nov 2021 00:00)  BSA (m2): 1.98 (01 Nov 2021 00:00)  I&O's Detail    07 Nov 2021 07:01  -  08 Nov 2021 07:00  --------------------------------------------------------  IN:  Total IN: 0 mL    OUT:    Voided (mL): 710 mL  Total OUT: 710 mL    Total NET: -710 mL        Allergies    penicillin (Other)    Intolerances                              8.4    14.35 )-----------( 377      ( 08 Nov 2021 10:18 )             26.7   11-08    136  |  99  |  15.5  ----------------------------<  95  4.0   |  23.0  |  0.96    Ca    7.8<L>      08 Nov 2021 10:18  Phos  2.5     11-08  Mg     2.0     11-08    PTT - ( 07 Nov 2021 16:02 )  PTT:37.3 sec    ROS:    PHYSICAL EXAM:  Constitutional: resting comfortably   Respiratory: no respiratory distress, no dyspnea, no supplemental o2 needed   Gastrointestinal: abdomen softly distended, mild ttp, no guarding, no peritonitis,  midline wound vacc with good seal at 125 mm Hg   Genitourinary: voiding spontaneously   Neurological: A&OX3  Skin: warm, dry and no rashes           MEDICATIONS  (STANDING):  apixaban 5 milliGRAM(s) Oral every 12 hours  epoetin yolanda-epbx (RETACRIT) Injectable 22357 Unit(s) SubCutaneous <User Schedule>  lactobacillus acidophilus 1 Tablet(s) Oral three times a day  levETIRAcetam 750 milliGRAM(s) Oral daily  metoprolol succinate ER 25 milliGRAM(s) Oral daily  pantoprazole    Tablet 40 milliGRAM(s) Oral before breakfast  potassium phosphate / sodium phosphate Powder (PHOS-NaK) 1 Packet(s) Oral two times a day  tamsulosin 0.4 milliGRAM(s) Oral at bedtime    MEDICATIONS  (PRN):  acetaminophen     Tablet .. 650 milliGRAM(s) Oral every 6 hours PRN Mild Pain (1 - 3)  traMADol 25 milliGRAM(s) Oral every 4 hours PRN Moderate Pain (4 - 6)  traMADol 50 milliGRAM(s) Oral every 6 hours PRN Severe Pain (7 - 10)      RADIOLOGY STUDIES:    CULTURES:

## 2021-11-09 NOTE — PROGRESS NOTE ADULT - ASSESSMENT
Patient Recovered for Kidney Failure,    No longer does he need HD,     Dialysis  center Notified,     TY

## 2021-11-09 NOTE — PROGRESS NOTE ADULT - SUBJECTIVE AND OBJECTIVE BOX
SUBJECTIVE/24 hour events:  Patient is a 61yMale presenting with SBO now POD# 9 ex-lap with drainage of mesenteric hematoma and abscess in the abdominal wall. Patient with no acute events issues, pain controlled on current  regimen, tolerating a diet, voiding spontaneously, no longer requiring HD. Patient awaiting placement to rehab    Vital Signs Last 24 Hrs  T(C): 36.6 (08 Nov 2021 16:12), Max: 37.6 (08 Nov 2021 06:09)  T(F): 97.9 (08 Nov 2021 16:12), Max: 99.6 (08 Nov 2021 06:09)  HR: 76 (08 Nov 2021 16:12) (76 - 83)  BP: 155/80 (08 Nov 2021 16:12) (148/78 - 155/80)  RR: 18 (08 Nov 2021 16:12) (18 - 18)  SpO2: 97% (08 Nov 2021 16:12) (95% - 98%)    Drug Dosing Weight  Height (cm): 172.7 (01 Nov 2021 00:00)  Weight (kg): 84.6 (01 Nov 2021 00:00)  BMI (kg/m2): 28.4 (01 Nov 2021 00:00)  BSA (m2): 1.98 (01 Nov 2021 00:00)  I&O's Detail    07 Nov 2021 07:01  -  08 Nov 2021 07:00  --------------------------------------------------------  IN:  Total IN: 0 mL    OUT:    Voided (mL): 710 mL  Total OUT: 710 mL    Total NET: -710 mL        Allergies    penicillin (Other)    Intolerances                              8.4    14.35 )-----------( 377      ( 08 Nov 2021 10:18 )             26.7   11-08    136  |  99  |  15.5  ----------------------------<  95  4.0   |  23.0  |  0.96    Ca    7.8<L>      08 Nov 2021 10:18  Phos  2.5     11-08  Mg     2.0     11-08    PTT - ( 07 Nov 2021 16:02 )  PTT:37.3 sec    ROS:    PHYSICAL EXAM:  Constitutional: resting comfortably   Respiratory: no respiratory distress, no dyspnea, no supplemental o2 needed   Gastrointestinal: abdomen softly distended, mild ttp, no guarding, no peritonitis,  midline wound vacc with good seal at 125 mm Hg   Genitourinary: voiding spontaneously   Neurological: A&OX3  Skin: warm, dry and no rashes           MEDICATIONS  (STANDING):  apixaban 5 milliGRAM(s) Oral every 12 hours  epoetin yolanda-epbx (RETACRIT) Injectable 82725 Unit(s) SubCutaneous <User Schedule>  lactobacillus acidophilus 1 Tablet(s) Oral three times a day  levETIRAcetam 750 milliGRAM(s) Oral daily  metoprolol succinate ER 25 milliGRAM(s) Oral daily  pantoprazole    Tablet 40 milliGRAM(s) Oral before breakfast  potassium phosphate / sodium phosphate Powder (PHOS-NaK) 1 Packet(s) Oral two times a day  tamsulosin 0.4 milliGRAM(s) Oral at bedtime    MEDICATIONS  (PRN):  acetaminophen     Tablet .. 650 milliGRAM(s) Oral every 6 hours PRN Mild Pain (1 - 3)  traMADol 25 milliGRAM(s) Oral every 4 hours PRN Moderate Pain (4 - 6)  traMADol 50 milliGRAM(s) Oral every 6 hours PRN Severe Pain (7 - 10)    Patient with past medical/surgical history of colectomy , colostomy and colostomy reversal, previous small bowel obstruction and  small bowel resection  is POD# 9 s/p exploratory laparotomy, drainage of 3 mesenteric hematomas and abscess of abdominal wall, he has been tolerating his diet, no nausea, no emesis, no abdominal pain, no abdominal distension.      ACS Plan :    -f/u for urinary retention  -continue pain management   -wound vac management   -Encourage incentive spirometer   -Patient does not require hemodialysis as per nephrology   -Monitor clusters of diarrhea

## 2021-11-09 NOTE — PROGRESS NOTE ADULT - ASSESSMENT
Patient with past medical/surgical history of colectomy , colostomy and colostomy reversal, previous small bowel obstruction and  small bowel resection  is POD# 9 s/p exploratory laparotomy, drainage of 3 mesenteric hematomas and abscess of abdominal wall, he has been tolerating his diet, no nausea, no emesis, no abdominal pain, no abdominal distension.  Plan   -f/u for urinary retention  -continue pain management   -wound vac management   -Encourage incentive spirometer   -Patient does not require hemodialysis as per nephrology   -Monitor clusters of diarrhea

## 2021-11-10 VITALS
OXYGEN SATURATION: 97 % | HEART RATE: 76 BPM | SYSTOLIC BLOOD PRESSURE: 131 MMHG | DIASTOLIC BLOOD PRESSURE: 72 MMHG | TEMPERATURE: 98 F | RESPIRATION RATE: 18 BRPM

## 2021-11-10 PROCEDURE — 88307 TISSUE EXAM BY PATHOLOGIST: CPT

## 2021-11-10 PROCEDURE — 97163 PT EVAL HIGH COMPLEX 45 MIN: CPT

## 2021-11-10 PROCEDURE — 82947 ASSAY GLUCOSE BLOOD QUANT: CPT

## 2021-11-10 PROCEDURE — 82962 GLUCOSE BLOOD TEST: CPT

## 2021-11-10 PROCEDURE — 87641 MR-STAPH DNA AMP PROBE: CPT

## 2021-11-10 PROCEDURE — P9059: CPT

## 2021-11-10 PROCEDURE — 86850 RBC ANTIBODY SCREEN: CPT

## 2021-11-10 PROCEDURE — P9037: CPT

## 2021-11-10 PROCEDURE — 82803 BLOOD GASES ANY COMBINATION: CPT

## 2021-11-10 PROCEDURE — 74176 CT ABD & PELVIS W/O CONTRAST: CPT | Mod: MA

## 2021-11-10 PROCEDURE — 87186 SC STD MICRODIL/AGAR DIL: CPT

## 2021-11-10 PROCEDURE — 36600 WITHDRAWAL OF ARTERIAL BLOOD: CPT

## 2021-11-10 PROCEDURE — U0003: CPT

## 2021-11-10 PROCEDURE — 85025 COMPLETE CBC W/AUTO DIFF WBC: CPT

## 2021-11-10 PROCEDURE — 83690 ASSAY OF LIPASE: CPT

## 2021-11-10 PROCEDURE — C1889: CPT

## 2021-11-10 PROCEDURE — P9100: CPT

## 2021-11-10 PROCEDURE — U0005: CPT

## 2021-11-10 PROCEDURE — 84295 ASSAY OF SERUM SODIUM: CPT

## 2021-11-10 PROCEDURE — 87640 STAPH A DNA AMP PROBE: CPT

## 2021-11-10 PROCEDURE — 36430 TRANSFUSION BLD/BLD COMPNT: CPT

## 2021-11-10 PROCEDURE — 84100 ASSAY OF PHOSPHORUS: CPT

## 2021-11-10 PROCEDURE — 86803 HEPATITIS C AB TEST: CPT

## 2021-11-10 PROCEDURE — 99261: CPT

## 2021-11-10 PROCEDURE — 82435 ASSAY OF BLOOD CHLORIDE: CPT

## 2021-11-10 PROCEDURE — 86901 BLOOD TYPING SEROLOGIC RH(D): CPT

## 2021-11-10 PROCEDURE — 84540 ASSAY OF URINE/UREA-N: CPT

## 2021-11-10 PROCEDURE — 96375 TX/PRO/DX INJ NEW DRUG ADDON: CPT

## 2021-11-10 PROCEDURE — 82330 ASSAY OF CALCIUM: CPT

## 2021-11-10 PROCEDURE — 71045 X-RAY EXAM CHEST 1 VIEW: CPT

## 2021-11-10 PROCEDURE — 84132 ASSAY OF SERUM POTASSIUM: CPT

## 2021-11-10 PROCEDURE — 85610 PROTHROMBIN TIME: CPT

## 2021-11-10 PROCEDURE — 96374 THER/PROPH/DIAG INJ IV PUSH: CPT

## 2021-11-10 PROCEDURE — C1765: CPT

## 2021-11-10 PROCEDURE — 87340 HEPATITIS B SURFACE AG IA: CPT

## 2021-11-10 PROCEDURE — 99024 POSTOP FOLLOW-UP VISIT: CPT | Mod: GC

## 2021-11-10 PROCEDURE — 83605 ASSAY OF LACTIC ACID: CPT

## 2021-11-10 PROCEDURE — 97167 OT EVAL HIGH COMPLEX 60 MIN: CPT

## 2021-11-10 PROCEDURE — 85730 THROMBOPLASTIN TIME PARTIAL: CPT

## 2021-11-10 PROCEDURE — 82570 ASSAY OF URINE CREATININE: CPT

## 2021-11-10 PROCEDURE — 86900 BLOOD TYPING SEROLOGIC ABO: CPT

## 2021-11-10 PROCEDURE — P9016: CPT

## 2021-11-10 PROCEDURE — 86769 SARS-COV-2 COVID-19 ANTIBODY: CPT

## 2021-11-10 PROCEDURE — 86922 COMPATIBILITY TEST ANTIGLOB: CPT

## 2021-11-10 PROCEDURE — C8929: CPT

## 2021-11-10 PROCEDURE — 87635 SARS-COV-2 COVID-19 AMP PRB: CPT

## 2021-11-10 PROCEDURE — 88304 TISSUE EXAM BY PATHOLOGIST: CPT

## 2021-11-10 PROCEDURE — 36415 COLL VENOUS BLD VENIPUNCTURE: CPT

## 2021-11-10 PROCEDURE — 74018 RADEX ABDOMEN 1 VIEW: CPT

## 2021-11-10 PROCEDURE — 93005 ELECTROCARDIOGRAM TRACING: CPT

## 2021-11-10 PROCEDURE — 99285 EMERGENCY DEPT VISIT HI MDM: CPT

## 2021-11-10 PROCEDURE — 87070 CULTURE OTHR SPECIMN AEROBIC: CPT

## 2021-11-10 PROCEDURE — 80048 BASIC METABOLIC PNL TOTAL CA: CPT

## 2021-11-10 PROCEDURE — 87075 CULTR BACTERIA EXCEPT BLOOD: CPT

## 2021-11-10 PROCEDURE — 94002 VENT MGMT INPAT INIT DAY: CPT

## 2021-11-10 PROCEDURE — 83735 ASSAY OF MAGNESIUM: CPT

## 2021-11-10 PROCEDURE — 85027 COMPLETE CBC AUTOMATED: CPT

## 2021-11-10 PROCEDURE — 85018 HEMOGLOBIN: CPT

## 2021-11-10 PROCEDURE — 86706 HEP B SURFACE ANTIBODY: CPT

## 2021-11-10 PROCEDURE — 85014 HEMATOCRIT: CPT

## 2021-11-10 PROCEDURE — 87077 CULTURE AEROBIC IDENTIFY: CPT

## 2021-11-10 PROCEDURE — 0225U NFCT DS DNA&RNA 21 SARSCOV2: CPT

## 2021-11-10 PROCEDURE — 80053 COMPREHEN METABOLIC PANEL: CPT

## 2021-11-10 RX ADMIN — Medication 25 MILLIGRAM(S): at 05:30

## 2021-11-10 RX ADMIN — Medication 1 TABLET(S): at 05:27

## 2021-11-10 RX ADMIN — Medication 1 TABLET(S): at 14:02

## 2021-11-10 RX ADMIN — Medication 975 MILLIGRAM(S): at 11:28

## 2021-11-10 RX ADMIN — TRAMADOL HYDROCHLORIDE 50 MILLIGRAM(S): 50 TABLET ORAL at 15:54

## 2021-11-10 RX ADMIN — TRAMADOL HYDROCHLORIDE 25 MILLIGRAM(S): 50 TABLET ORAL at 09:33

## 2021-11-10 RX ADMIN — Medication 975 MILLIGRAM(S): at 05:27

## 2021-11-10 RX ADMIN — APIXABAN 5 MILLIGRAM(S): 2.5 TABLET, FILM COATED ORAL at 08:16

## 2021-11-10 RX ADMIN — Medication 1 PACKET(S): at 05:28

## 2021-11-10 RX ADMIN — Medication 975 MILLIGRAM(S): at 12:20

## 2021-11-10 RX ADMIN — LEVETIRACETAM 750 MILLIGRAM(S): 250 TABLET, FILM COATED ORAL at 11:28

## 2021-11-10 RX ADMIN — PANTOPRAZOLE SODIUM 40 MILLIGRAM(S): 20 TABLET, DELAYED RELEASE ORAL at 05:27

## 2021-11-10 RX ADMIN — ERYTHROPOIETIN 10000 UNIT(S): 10000 INJECTION, SOLUTION INTRAVENOUS; SUBCUTANEOUS at 14:02

## 2021-11-10 RX ADMIN — TRAMADOL HYDROCHLORIDE 25 MILLIGRAM(S): 50 TABLET ORAL at 11:01

## 2021-11-10 NOTE — PROGRESS NOTE ADULT - NUTRITIONAL ASSESSMENT
This patient has been assessed with a concern for Malnutrition and has been determined to have a diagnosis/diagnoses of Severe protein-calorie malnutrition.    This patient is being managed with:   Diet NPO-  Except Medications     Special Instructions for Nursing:  Except Medications  Entered: Oct 31 2021 11:27PM    
This patient has been assessed with a concern for Malnutrition and has been determined to have a diagnosis/diagnoses of Severe protein-calorie malnutrition.    This patient is being managed with:   Diet Renal Restrictions-  For patients receiving Renal Replacement - No Protein Restr No Conc K No Conc Phos Low Sodium  Entered: Nov 8 2021  9:13AM    
This patient has been assessed with a concern for Malnutrition and has been determined to have a diagnosis/diagnoses of Severe protein-calorie malnutrition.    This patient is being managed with:   Diet Clear Liquid-  Entered: Nov 4 2021  7:49AM    
This patient has been assessed with a concern for Malnutrition and has been determined to have a diagnosis/diagnoses of Severe protein-calorie malnutrition.    This patient is being managed with:   Diet NPO-  Except Medications     Special Instructions for Nursing:  Except Medications  Entered: Oct 31 2021 11:27PM    
This patient has been assessed with a concern for Malnutrition and has been determined to have a diagnosis/diagnoses of Severe protein-calorie malnutrition.    This patient is being managed with:   Diet NPO-  Except Medications  With Ice Chips/Sips of Water  Entered: Oct 26 2021  5:49PM    
This patient has been assessed with a concern for Malnutrition and has been determined to have a diagnosis/diagnoses of Severe protein-calorie malnutrition.    This patient is being managed with:   Diet NPO-  Except Medications  With Ice Chips/Sips of Water  Entered: Oct 26 2021  5:49PM    
This patient has been assessed with a concern for Malnutrition and has been determined to have a diagnosis/diagnoses of Severe protein-calorie malnutrition.    This patient is being managed with:   Diet Clear Liquid-  Entered: Nov 4 2021  7:49AM    
This patient has been assessed with a concern for Malnutrition and has been determined to have a diagnosis/diagnoses of Severe protein-calorie malnutrition.    This patient is being managed with:   Diet NPO-  Except Medications  With Ice Chips/Sips of Water  Entered: Nov  3 2021  8:42AM    
This patient has been assessed with a concern for Malnutrition and has been determined to have a diagnosis/diagnoses of Severe protein-calorie malnutrition.    This patient is being managed with:   Diet NPO-  Except Medications  With Ice Chips/Sips of Water  Entered: Oct 26 2021  5:49PM    
This patient has been assessed with a concern for Malnutrition and has been determined to have a diagnosis/diagnoses of Severe protein-calorie malnutrition.    This patient is being managed with:   Diet NPO-  Except Medications  With Ice Chips/Sips of Water  Entered: Oct 26 2021  5:49PM      This patient has been assessed with a concern for Malnutrition and has been determined to have a diagnosis/diagnoses of Severe protein-calorie malnutrition.    This patient is being managed with:   Diet NPO-  Except Medications  With Ice Chips/Sips of Water  Entered: Oct 26 2021  5:49PM    
This patient has been assessed with a concern for Malnutrition and has been determined to have a diagnosis/diagnoses of Severe protein-calorie malnutrition.    This patient is being managed with:   Diet Renal Restrictions-  For patients receiving Renal Replacement - No Protein Restr No Conc K No Conc Phos Low Sodium  Entered: Nov 8 2021  9:13AM    
This patient has been assessed with a concern for Malnutrition and has been determined to have a diagnosis/diagnoses of Severe protein-calorie malnutrition.    This patient is being managed with:   Diet Renal Restrictions-  For patients receiving Renal Replacement - No Protein Restr No Conc K No Conc Phos Low Sodium  Entered: Nov 8 2021  9:13AM    
This patient has been assessed with a concern for Malnutrition and has been determined to have a diagnosis/diagnoses of Severe protein-calorie malnutrition.    This patient is being managed with:   Diet Low Fiber-  Entered: Nov 7 2021 12:27AM

## 2021-11-10 NOTE — PROGRESS NOTE ADULT - SUBJECTIVE AND OBJECTIVE BOX
HPI/OVERNIGHT EVENTS:  no acute events overnight. having bowel function, tolerating diet, afebrile. vss. denies f/c/nv/cp/sob.    MEDICATIONS  (STANDING):  acetaminophen     Tablet .. 975 milliGRAM(s) Oral every 6 hours  apixaban 5 milliGRAM(s) Oral every 12 hours  epoetin yolanda-epbx (RETACRIT) Injectable 12449 Unit(s) SubCutaneous <User Schedule>  lactobacillus acidophilus 1 Tablet(s) Oral three times a day  levETIRAcetam 750 milliGRAM(s) Oral daily  metoprolol succinate ER 25 milliGRAM(s) Oral daily  pantoprazole    Tablet 40 milliGRAM(s) Oral before breakfast  potassium phosphate / sodium phosphate Powder (PHOS-NaK) 1 Packet(s) Oral two times a day  tamsulosin 0.4 milliGRAM(s) Oral at bedtime    MEDICATIONS  (PRN):  traMADol 25 milliGRAM(s) Oral every 4 hours PRN Moderate Pain (4 - 6)  traMADol 50 milliGRAM(s) Oral every 6 hours PRN Severe Pain (7 - 10)      Vital Signs Last 24 Hrs  T(C): 36.6 (09 Nov 2021 20:35), Max: 37.2 (09 Nov 2021 07:00)  T(F): 97.8 (09 Nov 2021 20:35), Max: 99 (09 Nov 2021 07:00)  HR: 72 (09 Nov 2021 20:35) (72 - 80)  BP: 129/63 (09 Nov 2021 20:35) (129/63 - 145/76)  BP(mean): --  RR: 18 (09 Nov 2021 20:35) (18 - 20)  SpO2: 98% (09 Nov 2021 20:35) (55% - 98%)    PHYSICAL EXAM:  Constitutional: resting comfortably   Respiratory: no respiratory distress, no dyspnea, no supplemental o2 needed   Gastrointestinal: abdomen softly distended, mild ttp, no guarding, no peritonitis, good granulation tissue  Genitourinary: voiding spontaneously   Neurological: A&OX3  Skin: warm, dry and no rashes       I&O's Detail    09 Nov 2021 07:01  -  10 Nov 2021 00:41  --------------------------------------------------------  IN:  Total IN: 0 mL    OUT:    Voided (mL): 625 mL  Total OUT: 625 mL    Total NET: -625 mL          LABS:                        8.1    12.75 )-----------( 413      ( 09 Nov 2021 09:08 )             26.0     11-09    138  |  101  |  16.5  ----------------------------<  83  4.3   |  22.0  |  1.14    Ca    7.9<L>      09 Nov 2021 09:08  Phos  2.5     11-08  Mg     2.0     11-08

## 2021-11-10 NOTE — PROGRESS NOTE ADULT - ATTENDING COMMENTS
60 yo M with extensive cardiac history is s/p exploratory laparotomy, extensive RENEA, 4cm of small bowel resected for SB near amputation, and repair of enterotomy, was on vasopressors through procedure which were discontinued after extubation.   Admit to ICU for serial abdominal exams and H/H for bleeding, AAOx3, NG in place, painting present   PUL CTA  CV RRR  GI midline dressing intact  WBC 9.9/ hgb 11.3  Plan   1. Started heparin gtt this AM and will follow H/H if stable x2 the can transfer to floor  2. Continue Clindamycin for 48 hrs  3. Starting HD this Am  4. Analgesia for pain control and consider PCA  5. ice chip while NG to suction  6. PT/OT to mobility      Code [id number]
60 yo M with CHF, CVA, HTN, HLD, A-fib on AC, ESRD on HD, and s/p colectomy with ostomy and reversal presents with SBO due to adhesive disease, nonresolution with decompression and gastrograffin challenge.   AAOX3, mild discomfort , NG to suction  AVSS  PUL CTA  CV IRIR  GI Plat, soft, NT, Healed old midline and right transeverse incisions.   MS CORDOVA  CT scan proximal dilated SB and decompress distal SB with cut off at mid gut  Plan  1. Diagnostic laparoscopy with RENEA , possible open.  2. I discussed the risk and benfits of surgery with the patient to include but not limited to infection, bleeding and recurrence. The patient was concerned about having an ostomy but I explained that is unlike.  He consented to surgery after understanding risk and benefits.   3.  Heparin gtt held and will resume after OR, recheck PTT q6h  4. Periop clindamycin due to PCN allergy
Agree with above assessment.  The patient was seen and examined by me.  The patient is with flatus and BM.  He reports he is feeling much improved.  Abdomen is soft, non distended, no guarding, no rebound, wound VAC in place to midline wound.  Continue with wound VAC therapy, advance PO, OOB mobilize, D/C painting today. Surgically improved.
Agree with above assessment.  The patient was seen and examined by me.  The patient is without flatus or BM, endorses abdominal pain.  The abdomen is distended, mirna-incisional tenderness is present.  NGT flushed, KUB without free air, pattern appears with possible ileus versus pSBO.   Continue with NGT decompression, monitor WBC's, if continue to increase will need CT scan imaging.
Agree with above assessment.  The patient was seen and examined by me. The patient is with abdominal pain, no nausea, no vomit, patient uncertain but states he may had an episode of flatus.  Abdomen is distended but less than yesterday, there is tenderness to palpation without guarding or rebound, there is murky drainage noted from the upper third of the incision without fluctuance.  WBC's increased to 24, plan for CT scan to exclude enteric leak and/or abscess.  Recommendations to follow review of the imaging.
I have seen and examined the patient during SICU rounds for 9-11a  events noted.    Neuro: Awake, non focal  CV: HD normal, perfusion is adequate  GI: states minimal flatus, NT in place, abd soft, incision tenderness, VAC in place, non distended, non tympanic  : Urine flow adequate, electrolytes OK , no RRT today  ID: Gram stab GNR proteus: on zosyn  Heme: H/H drop noted, no signs of external; bleeding, on DVT chemoprophylaxes,   Endo: glycemia at target.    PLAN:  NPO, NGT  OOB  Anticoagulation to be held and resume 11/3   cont dvt chemoprophylaxes  NO icu needs at this time, ok to transgfer to floor
Patient seen and examined on am rounds. No flatus. Pain controlled, NG with bilious output, abd soft, mild TTP, distended. Cont conservative management with NG to LWIS. Encourage ambulation and work with PT.
Patient seen and examined on rounds. abd soft, still distended, no ROBF yet. Continue NGT to LIWS. Encouraged ambulation.
Pt seen and examined by me   agree with findings
seen and examined 11-03-21 @ 1010 (on hemodialysis)    NPO w/ NG to suction  +flatus / no BM    afeb  AVSS  soft / NT / ND  normoactive bowel sounds  midline laparotomy wound VAC functioning      10/26/2021 - ex-lap / RENEA / small bowel resection for SBO  10/31/2021 - ex-lap / evacuation of mesenteric hematoma  -NPO / NG / IVF until post-op ileus resolves    afib / bioprosthetic MVR / bioprosthetic AVR  -restart heparin infusion w/o bolus
seen and examined 11-04-21 @ 0950    NG tube removed this morning  tolerating clears w/o nausea or vomiting  +flatus / no BM    afeb  AVSS  soft / mild diffuse tenderness / mildly distended / nontympanitic  midline laparotomy wound VAC changed today and there is no evidence of infection    NG tube - 100 mL / hours    hgb - 7.3 -> 1u RBC (11/3) -> 8.4 -> 8.5  PTT = 40.6 sec on heparin @ 1000 units/hr    10/26/2021 - ex-lap / RENEA / small bowel resection for SBO  10/31/2021 - ex-lap / evacuation of mesenteric hematoma  -continue clears until abdominal distension resolved    afib / bioprosthetic MVR / bioprosthetic AVR  -continue heparin infusion with low PTT goal given recent postoperative mesenteric hematoma    ESRD on HD MWF  -24 hour CrCl since he is not oliguric when Chapman is in place
seen and examined 11-05-21 @ 1020    tolerating clears w/o nausea or vomiting  +flatus / no BM    soft / mild diffuse tenderness / mildly distended  tympanitic across upper abdomen  midline laparotomy wound VAC functioning    hgb - stable @ 8 g/dL since 11/3  PTT = 36.4 sec on heparin @ 1200 units/hr    10/26/2021 - ex-lap / RENEA / small bowel resection for SBO  10/31/2021 - ex-lap / evacuation of mesenteric hematoma  -continue clears until abdominal distension resolved    afib / bioprosthetic MVR / bioprosthetic AVR  -continue heparin infusion with low PTT goal given recent postoperative mesenteric hematoma    ESRD on HD MWF  -based on 24 hour urine collection with Chapman in place, Cr clearance = 57.4 mL/min ([1.1 g creatinine / 24 hrs] / [1.33 mg creatinine /dL])  -stop hemodialysis and monitor BMP daily  -keep Chapman and f/u with urology as an outpatient
61y Male with small bowel obstruction, he has not passed gasses, he has vomites,  currently with NGT to intermittent suction , he fail the gastrografin challenge  yesterday and remains obstructed  Abdomen soft and decompressed, tender in mid-lower abdomen.   Patient had previous operations and every attempt was made to avoid surgery, but at this point, barring surprises, patient will require exploration RENEA.  On schedule for the same
Agree with above assessment.  The patient was seen and examined by me.  The patient is still with mid abdominal discomfort, no nausea, no vomit, no flatus or BM.  Abdomen is softly distended with mild mid to left abdominal tenderness, no guarding, no rebound.  KUB reveals there is small bowel dilation without retained contrast in stomach or small bowel.  Will plan for Gastrografin challenge, unclear if this will resolve with conservative management.  The patient is aware that should his condition worsen or fail to improve then surgical exploration may be warranted.
Pt seen and examined by me  agree with findings  jerry po  ambulating  likely DC today
Agree with above assessment.  The patient was seen and examined by me.  The patient is with no nausea, states that the abdominal pain is improved.  The patient is tolerating PO, having loose BM's.  The abdomen is soft, non tender, wound VAC is in place.  The patient is with some difficulty with urination, will request Urology evaluation.  Continue with OOB ambulate, PT, continue with wound VAC therapy, due to be changed tomorrow.

## 2021-11-10 NOTE — PROGRESS NOTE ADULT - PROVIDER SPECIALTY LIST ADULT
Nephrology
SICU
Surgery
Surgery
Trauma Surgery
Trauma Surgery
Anesthesia
Nephrology
SICU
Surgery
Trauma Surgery
Electrophysiology
Nephrology
Surgery
Surgery
Trauma Surgery
Urology
Electrophysiology
Nephrology
Nephrology
Trauma Surgery

## 2021-11-10 NOTE — PROGRESS NOTE ADULT - ASSESSMENT
61yoM s/p ex-lap, drainage of 3 mesenteric hematomas and abscess of abdominal wall. post operatively doing well with bowel function and tolerating diet.  - daily WTD dressing  - continue IS  - continue diet  - SEAN pending

## 2021-11-10 NOTE — CHART NOTE - NSCHARTNOTEFT_GEN_A_CORE
Source: Patient [ ]  Family [ ]   other [ ]  61yoM s/p ex-lap, drainage of 3 mesenteric hematomas and abscess of abdominal wall. post operatively doing well with bowel function and tolerating diet.    Current Diet: Diet, Renal Restrictions:   For patients receiving Renal Replacement - No Protein Restr, No Conc K, No Conc Phos, Low Sodium (11-08-21 @ 09:14)      Patient reports [ ] nausea  [ ] vomiting [ ] diarrhea [ ] constipation  [ ]chewing problems [ ] swallowing issues  [ ] other:     PO intake:  < 50% [ ]   50-75%  [x ]   %  [ ]  other :    Source for PO intake [ ] Patient [ ] family [ ] chart [ ] staff [ x] other      Current Weight:   11/3 85.8 kg    % Weight Change     Pertinent Medications: MEDICATIONS  (STANDING):  acetaminophen     Tablet .. 975 milliGRAM(s) Oral every 6 hours  apixaban 5 milliGRAM(s) Oral every 12 hours  epoetin yolanda-epbx (RETACRIT) Injectable 91676 Unit(s) SubCutaneous <User Schedule>  lactobacillus acidophilus 1 Tablet(s) Oral three times a day  levETIRAcetam 750 milliGRAM(s) Oral daily  metoprolol succinate ER 25 milliGRAM(s) Oral daily  pantoprazole    Tablet 40 milliGRAM(s) Oral before breakfast  potassium phosphate / sodium phosphate Powder (PHOS-NaK) 1 Packet(s) Oral two times a day  tamsulosin 0.4 milliGRAM(s) Oral at bedtime    MEDICATIONS  (PRN):  traMADol 25 milliGRAM(s) Oral every 4 hours PRN Moderate Pain (4 - 6)  traMADol 50 milliGRAM(s) Oral every 6 hours PRN Severe Pain (7 - 10)    Pertinent Labs: CBC Full  -  ( 09 Nov 2021 09:08 )  WBC Count : 12.75 K/uL  RBC Count : 2.77 M/uL  Hemoglobin : 8.1 g/dL  Hematocrit : 26.0 %  Platelet Count - Automated : 413 K/uL  Mean Cell Volume : 93.9 fl  Mean Cell Hemoglobin : 29.2 pg  Mean Cell Hemoglobin Concentration : 31.2 gm/dL  Auto Neutrophil # : x  Auto Lymphocyte # : x  Auto Monocyte # : x  Auto Eosinophil # : x  Auto Basophil # : x  Auto Neutrophil % : x  Auto Lymphocyte % : x  Auto Monocyte % : x  Auto Eosinophil % : x  Auto Basophil % : x  11-09 Na138 mmol/L Glu 83 mg/dL K+ 4.3 mmol/L Cr  1.14 mg/dL BUN 16.5 mg/dL Phos n/a   Alb n/a   PAB n/a               Skin: sx incision   Nutrition focused physical exam- previously conducted - found signs of malnutrition [ ]absent [ ]present    Subcutaneous fat loss: mod  [x ] Orbital fat pads region, [x ]Buccal fat region, [ ]Triceps region,  [ ]Ribs region    Muscle wasting: mod  [x ]Temples region, [x ]Clavicle region, [x ]Shoulder region, [ ]Scapula region, [ ]Interosseous region,  [ ]thigh region, [ ]Calf region    Estimated Needs:   [x ] no change since previous assessment  [ ] recalculated:           Current Nutrition Diagnosis:  Pt remains at high nutrition risk secondary to severe (acute) protein calorie malnutrition related to inability to meet sufficient protein energy  requirements with altered GI function in setting of SBO, s/p ex lap with 4cm SBR on 10/26 for necrotic bowel, s/p exploratory laparotomy and evacuation of mesenteric hematoma as evidenced by physical signs of moderate muscle mass and fat loss, meeting less then 50% estimated needs > 5 days. Pt tolerating Renal diet, O intake approx 75%. aware of plan for D/C.    Recommendations:   1. RX: Nephro-jeanie, folic acid and Vit. C daily (500mg)   2. Check weight daily to monitor trends   3. Monitor H/H, Renal labs               Monitoring and Evaluation:   [ ] PO intake [ ] Tolerance to diet prescription [X] Weights  [X] Follow up per protocol [X] Labs:

## 2021-11-10 NOTE — PROGRESS NOTE ADULT - ATTENDING SUPERVISION STATEMENT
ACP
Resident
ACP
ACP/Resident
ACP/Resident
Resident
ACP
ACP/Resident
Resident

## 2021-11-18 ENCOUNTER — APPOINTMENT (OUTPATIENT)
Dept: VASCULAR SURGERY | Facility: CLINIC | Age: 61
End: 2021-11-18

## 2021-11-23 ENCOUNTER — APPOINTMENT (OUTPATIENT)
Dept: TRAUMA SURGERY | Facility: CLINIC | Age: 61
End: 2021-11-23

## 2021-11-23 VITALS
SYSTOLIC BLOOD PRESSURE: 116 MMHG | RESPIRATION RATE: 16 BRPM | HEART RATE: 101 BPM | TEMPERATURE: 97.5 F | DIASTOLIC BLOOD PRESSURE: 66 MMHG | OXYGEN SATURATION: 97 %

## 2021-12-02 ENCOUNTER — APPOINTMENT (OUTPATIENT)
Dept: CARDIOLOGY | Facility: CLINIC | Age: 61
End: 2021-12-02

## 2021-12-07 ENCOUNTER — APPOINTMENT (OUTPATIENT)
Dept: TRAUMA SURGERY | Facility: CLINIC | Age: 61
End: 2021-12-07

## 2021-12-14 ENCOUNTER — APPOINTMENT (OUTPATIENT)
Dept: TRAUMA SURGERY | Facility: CLINIC | Age: 61
End: 2021-12-14
Payer: MEDICARE

## 2021-12-14 VITALS
TEMPERATURE: 97.5 F | HEART RATE: 91 BPM | RESPIRATION RATE: 16 BRPM | DIASTOLIC BLOOD PRESSURE: 67 MMHG | OXYGEN SATURATION: 95 % | SYSTOLIC BLOOD PRESSURE: 115 MMHG

## 2021-12-14 PROCEDURE — 99024 POSTOP FOLLOW-UP VISIT: CPT

## 2021-12-14 NOTE — HISTORY OF PRESENT ILLNESS
[de-identified] : Complicated RENEA, now with open wound\par off HD\par no fevers no chills , tolerating duet, having BM

## 2021-12-14 NOTE — PHYSICAL EXAM
[de-identified] : nad [de-identified] : soft, no n tender. midline wound is 20 cm long by 5 wide, great granulating tissue at base.

## 2021-12-14 NOTE — ASSESSMENT
[FreeTextEntry1] : Turbid evolution  after SBO RENEA\par no on eliquis \par wound evolving well, no sign of infections having VAC q 72 hrs\par Plant to continue negative pressure dressings.\par Discuss the high likelyhood of developing ventral hernia\par current concern is wound healing.\par RTO in 2 weeks

## 2022-01-04 ENCOUNTER — APPOINTMENT (OUTPATIENT)
Dept: TRAUMA SURGERY | Facility: CLINIC | Age: 62
End: 2022-01-04
Payer: MEDICARE

## 2022-01-04 VITALS
SYSTOLIC BLOOD PRESSURE: 165 MMHG | RESPIRATION RATE: 16 BRPM | HEIGHT: 68 IN | TEMPERATURE: 98 F | DIASTOLIC BLOOD PRESSURE: 75 MMHG | OXYGEN SATURATION: 98 % | HEART RATE: 77 BPM | WEIGHT: 162 LBS | BODY MASS INDEX: 24.55 KG/M2

## 2022-01-04 PROCEDURE — 99024 POSTOP FOLLOW-UP VISIT: CPT

## 2022-01-04 NOTE — PHYSICAL EXAM
[JVD] : no jugular venous distention  [de-identified] : NAD [de-identified] : midline wound has closed to 14 cm long by 2cm at widest, great granulating tissue at base,

## 2022-01-04 NOTE — ASSESSMENT
[FreeTextEntry1] : Adequate evolution after RENEA, take back for bleeding.\par wound has closed as above.\par Silver nitrated the base of wound, patient tolerated procedure well.\par no nee for NPWT at the time.\par dry dressing daily.\par RTO in 2 weeks for wound check.

## 2022-01-09 NOTE — ED ADULT NURSE NOTE - PRO INTERPRETER NEED 2
26F w/ no significant PMHx p/w fevers, chills, palpitations, pain at site of surgery, b/l LE edema after having a Brazilian Butt Lift on 1/2/22 at [facility]  Pt. had a f/u appt w/ plastic surgeon a few days ago, who states the edema, ecchymosis, and erythema is to be expected.  Pt. was started on bactrim prophylactically.  States she's vomiting and unable to tolerate PO and cannot sit down.  Denies any CP, SOB.  pt noted to be tachycardic to 140s, ecchymosis noted on chest wall, abdomen and pelvic area,  bl thighs with erythema, warmth and firmness  abd tender diffusely  concerns fo cellulitis, will get CT chest/abd.pelvis to eval for surgical complications,  labs, fliuds, zofran, pain control lieky need admission
English

## 2022-01-10 ENCOUNTER — APPOINTMENT (OUTPATIENT)
Dept: ELECTROPHYSIOLOGY | Facility: CLINIC | Age: 62
End: 2022-01-10

## 2022-01-18 ENCOUNTER — APPOINTMENT (OUTPATIENT)
Dept: TRAUMA SURGERY | Facility: CLINIC | Age: 62
End: 2022-01-18
Payer: MEDICARE

## 2022-01-18 VITALS
HEART RATE: 86 BPM | BODY MASS INDEX: 23.95 KG/M2 | TEMPERATURE: 97.8 F | HEIGHT: 68 IN | DIASTOLIC BLOOD PRESSURE: 76 MMHG | OXYGEN SATURATION: 97 % | SYSTOLIC BLOOD PRESSURE: 151 MMHG | WEIGHT: 158 LBS | RESPIRATION RATE: 16 BRPM

## 2022-01-18 PROCEDURE — 99024 POSTOP FOLLOW-UP VISIT: CPT

## 2022-01-18 NOTE — PHYSICAL EXAM
[de-identified] : appears well [de-identified] : soft / NT / ND. midline laparotomy wound 14 x 1-2 cm without infection (see photo in [employer])\par

## 2022-01-18 NOTE — CONSULT LETTER
[Dear  ___] : Dear  [unfilled], [Courtesy Letter:] : I had the pleasure of seeing your patient, [unfilled], in my office today. [Consult Closing:] : Thank you very much for allowing me to participate in the care of this patient.  If you have any questions, please do not hesitate to contact me. [Sincerely,] : Sincerely, [FreeTextEntry1] : \par His midline laparotomy wound will take several more weeks to heal. [FreeTextEntry3] : \par Naman Fox MD, FACS\par Division of Acute Care Surgery\par [facility]\par [phone] (phone)\par [phone] (fax)\par

## 2022-01-18 NOTE — HISTORY OF PRESENT ILLNESS
[de-identified] : Ex-lap / evacuation of mesenteric hematomas on 10/31.\par Discharged to [facility] on 11/10.\par Last seen in the office on 1/4 where the wound was treated with Silver Nitrate and wound VAC was stopped.\par  [de-identified] : tolerating regular diet without nausea, vomiting or abdominal pain.\par no fevers or chills.\par VNS changes dressing every other day.\par

## 2022-02-01 NOTE — PROGRESS NOTE ADULT - PROVIDER SPECIALTY LIST ADULT
Hospitalist Topical Clindamycin Pregnancy And Lactation Text: This medication is Pregnancy Category B and is considered safe during pregnancy. It is unknown if it is excreted in breast milk.

## 2022-02-02 NOTE — ASU PREOP CHECKLIST - TEMPERATURE IN CELSIUS (DEGREES C)
If you are a smoker, it is important for your health to stop smoking. Please be aware that second hand smoke is also harmful.
36.6

## 2022-03-01 ENCOUNTER — APPOINTMENT (OUTPATIENT)
Dept: TRAUMA SURGERY | Facility: CLINIC | Age: 62
End: 2022-03-01
Payer: MEDICARE

## 2022-03-01 VITALS
OXYGEN SATURATION: 97 % | HEIGHT: 68 IN | SYSTOLIC BLOOD PRESSURE: 180 MMHG | BODY MASS INDEX: 27.58 KG/M2 | RESPIRATION RATE: 16 BRPM | WEIGHT: 182 LBS | DIASTOLIC BLOOD PRESSURE: 77 MMHG | HEART RATE: 87 BPM | TEMPERATURE: 97.5 F

## 2022-03-01 DIAGNOSIS — I25.2 OLD MYOCARDIAL INFARCTION: ICD-10-CM

## 2022-03-01 DIAGNOSIS — Z86.73 PERSONAL HISTORY OF TRANSIENT ISCHEMIC ATTACK (TIA), AND CEREBRAL INFARCTION W/OUT RESIDUAL DEFICITS: ICD-10-CM

## 2022-03-01 DIAGNOSIS — Z86.79 PERSONAL HISTORY OF OTHER DISEASES OF THE CIRCULATORY SYSTEM: ICD-10-CM

## 2022-03-01 PROCEDURE — 99212 OFFICE O/P EST SF 10 MIN: CPT

## 2022-03-02 ENCOUNTER — APPOINTMENT (OUTPATIENT)
Dept: ELECTROPHYSIOLOGY | Facility: CLINIC | Age: 62
End: 2022-03-02
Payer: MEDICARE

## 2022-03-02 VITALS
TEMPERATURE: 97.6 F | HEART RATE: 84 BPM | SYSTOLIC BLOOD PRESSURE: 132 MMHG | HEIGHT: 68 IN | OXYGEN SATURATION: 97 % | WEIGHT: 178 LBS | RESPIRATION RATE: 14 BRPM | DIASTOLIC BLOOD PRESSURE: 76 MMHG | BODY MASS INDEX: 26.98 KG/M2

## 2022-03-02 PROCEDURE — 93000 ELECTROCARDIOGRAM COMPLETE: CPT

## 2022-03-02 PROCEDURE — 99213 OFFICE O/P EST LOW 20 MIN: CPT

## 2022-03-04 PROBLEM — Z86.73 HISTORY OF TIA (TRANSIENT ISCHEMIC ATTACK) AND STROKE: Status: RESOLVED | Noted: 2022-03-01 | Resolved: 2022-03-04

## 2022-03-04 PROBLEM — I25.2 HISTORY OF MYOCARDIAL INFARCTION: Status: RESOLVED | Noted: 2022-03-01 | Resolved: 2022-03-04

## 2022-03-04 PROBLEM — Z86.79 HISTORY OF CARDIAC DISORDER: Status: RESOLVED | Noted: 2022-03-01 | Resolved: 2022-03-04

## 2022-03-04 NOTE — ASSESSMENT
[FreeTextEntry1] : RTC one  month  for  recheck\par No heavy lifting \par Continue present  medical  management\par Discussion with patient   All questions answered  Any acute symptoms and or concerns patient understands  to call back office  and  or  go  directly to [facility] ED\par

## 2022-03-04 NOTE — VITALS
[Tender] : tender [Occasional] : occasional [FreeTextEntry3] : over midline  incision site [FreeTextEntry1] : pt  unsure [FreeTextEntry2] : physical activity involving  abdominal core

## 2022-03-04 NOTE — REVIEW OF SYSTEMS
[Dysuria] : no dysuria [Incontinence] : no incontinence [Hesitancy] : no urinary hesitancy [Nocturia] : no nocturia [Genital Lesion] : no genital lesions [Testicular Pain] : no testicular pain [Negative] : Psychiatric [FreeTextEntry8] : s/p  ex lap/RENEA [de-identified] : mid line  abdominal incision

## 2022-03-04 NOTE — PHYSICAL EXAM
[Normal Breath Sounds] : Normal breath sounds [Normal Heart Sounds] : normal heart sounds [Abdomen Tenderness] : ~T ~M No abdominal tenderness [No Rash or Lesion] : No rash or lesion [Purpura] : no purpura  [Petechiae] : no petechiae [Skin Ulcer] : no ulcer [Skin Induration] : no induration [Alert] : alert [Oriented to Person] : oriented to person [Oriented to Place] : oriented to place [Oriented to Time] : oriented to time [Calm] : calm [de-identified] : wdwn male in nad [de-identified] : non icteric  sclera [de-identified] : trachea  midline [de-identified] : soft / NT / ND. midline laparotomy wound 14 x 1-2 cm without infection   dry  with  scab  to  upper proximal site   no  discharge  no  bleeding  non  tender  to palpation  abdomen soft  no rigidity  \par

## 2022-03-04 NOTE — HISTORY OF PRESENT ILLNESS
[FreeTextEntry1] : Patient presents  to [facility]  clinic for post op exam  s/p exploratory laparotomy/ lyis of adhesions   for  SBO   Patient a t  time  of  this  exam  denies  any fevers  no  chills   no  n/v/d   nl  bm nl urination   No  acute  abdominal  pain  Patient a t  time  of  this  exam  "states  feels  great" [de-identified] : Ex-lap / evacuation of mesenteric hematomas on 10/31.\par Discharged to [facility] on 11/10.\par .\par  [de-identified] : tolerating regular diet without nausea, vomiting or abdominal pain.\par no fevers or chills.\par

## 2022-03-07 NOTE — DIETITIAN INITIAL EVALUATION ADULT. - NUTRITION DIAGNOSIS
yes... Klisyri Counseling:  I discussed with the patient the risks of Klisyri including but not limited to erythema, scaling, itching, weeping, crusting, and pain.

## 2022-03-31 ENCOUNTER — APPOINTMENT (OUTPATIENT)
Dept: TRAUMA SURGERY | Facility: CLINIC | Age: 62
End: 2022-03-31
Payer: MEDICARE

## 2022-03-31 VITALS
RESPIRATION RATE: 16 BRPM | DIASTOLIC BLOOD PRESSURE: 78 MMHG | OXYGEN SATURATION: 99 % | HEART RATE: 83 BPM | WEIGHT: 182 LBS | SYSTOLIC BLOOD PRESSURE: 160 MMHG | HEIGHT: 68 IN | BODY MASS INDEX: 27.58 KG/M2 | TEMPERATURE: 97.3 F

## 2022-03-31 PROCEDURE — 99213 OFFICE O/P EST LOW 20 MIN: CPT

## 2022-04-01 ENCOUNTER — APPOINTMENT (OUTPATIENT)
Dept: NEPHROLOGY | Facility: CLINIC | Age: 62
End: 2022-04-01
Payer: MEDICARE

## 2022-04-01 VITALS
OXYGEN SATURATION: 99 % | WEIGHT: 185 LBS | BODY MASS INDEX: 28.04 KG/M2 | HEART RATE: 87 BPM | DIASTOLIC BLOOD PRESSURE: 70 MMHG | SYSTOLIC BLOOD PRESSURE: 160 MMHG | HEIGHT: 68 IN

## 2022-04-01 PROCEDURE — 99214 OFFICE O/P EST MOD 30 MIN: CPT

## 2022-04-01 RX ORDER — LOSARTAN POTASSIUM 25 MG/1
25 TABLET, FILM COATED ORAL
Qty: 90 | Refills: 1 | Status: DISCONTINUED | COMMUNITY
Start: 2021-07-08 | End: 2022-04-01

## 2022-04-01 RX ORDER — SUCRALFATE 1 G/10ML
1 SUSPENSION ORAL
Qty: 280 | Refills: 0 | Status: DISCONTINUED | COMMUNITY
Start: 2021-07-19 | End: 2022-04-01

## 2022-04-01 RX ORDER — SEVELAMER CARBONATE 800 MG/1
800 TABLET, FILM COATED ORAL
Qty: 180 | Refills: 3 | Status: DISCONTINUED | COMMUNITY
Start: 2020-12-31 | End: 2022-04-01

## 2022-04-01 RX ORDER — TORSEMIDE 20 MG/1
20 TABLET ORAL EVERY OTHER DAY
Qty: 60 | Refills: 0 | Status: DISCONTINUED | COMMUNITY
Start: 2022-03-02 | End: 2022-04-01

## 2022-04-01 NOTE — ASSESSMENT
[FreeTextEntry1] : RTC one  month\par F/u dermatology   Use soaps free of dye  and  fragrance\par Discussion with patient   All questions answered  Any acute symptoms and or concerns patient understands  to call back office  and  or  go  directly to [facility] ED\par

## 2022-04-01 NOTE — ASSESSMENT
[FreeTextEntry1] : Intestinal adhesions with partial obstruction (560.81) (K56.51)\par Small bowel obstruction due to postoperative adhesions (560.81) (K91.30)\par \par CKD - Off HD, \par \par Plan\par Atrial fibrillation, ESRD (end stage renal disease) Recovered, \par Start: Torsemide 20 MG Oral Tablet; TAKE 60 TABLET Every other day\par \par Discussion/Summary\par Continues to maintain sinus rhythm post ablation. States off dialysis now. Has no  lower extremity edema. Making urine. Will resume torsemide 20 mg every other day. \par \par Hold ARB, \par \par F.U Q 4 mo.,

## 2022-04-01 NOTE — REVIEW OF SYSTEMS
[Negative] : Psychiatric [Dysuria] : no dysuria [Incontinence] : no incontinence [Hesitancy] : no urinary hesitancy [Nocturia] : no nocturia [Genital Lesion] : no genital lesions [Testicular Pain] : no testicular pain [FreeTextEntry8] : s/p  ex lap/ RENEA [de-identified] : mid line  abdominal incision with  scab

## 2022-04-01 NOTE — HISTORY OF PRESENT ILLNESS
[FreeTextEntry1] : Patient presents  to [facility]  clinic for post op exam  s/p exploratory laparotomy/ lysis of adhesions   for  SBO   Patient at time  of  this  exam  denies  any fevers  no  chills   no  n/v/d   nl  bm nl urination   No  acute  abdominal  pain  Patient  presents at clinic today  due  to  concern of how his incision site looks    [de-identified] : Ex-lap / evacuation of mesenteric hematomas on 10/31.\par Discharged to [facility] on 11/10.\par .\par  [de-identified] : tolerating regular diet without nausea, vomiting or abdominal pain.\par no fevers or chills.\par

## 2022-04-01 NOTE — HISTORY OF PRESENT ILLNESS
[FreeTextEntry1] : ESRD - Recovered, \par \par S.P Colectomy, \par \par + Heart Murmur, Assessment\par Atrial fibrillation (427.31) (I48.91)\par Coronary artery disease with other form of angina pectoris (414.00,413.9) (I25.118)\par ESRD (end stage renal disease) on dialysis (585.6,V45.11) (N18.6,Z99.2)\par HLD (hyperlipidemia) (272.4) (E78.5)\par Benign essential hypertension (401.1) (I10)\par \par Plan\par Renew: Amiodarone HCl - 200 MG Oral Tablet; TAKE 0.5 TABLET Daily\par Renew: Metoprolol Succinate ER 50 MG Oral Tablet Extended Release 24 Hour; TAKE 1\par TABLET DAILY\par Renew: amLODIPine Besylate 5 MG Oral Tablet; TAKE 1 TABLET DAILY\par Hold : Losartan Potassium 25 MG \par Renew: Atorvastatin Calcium 40 MG Oral Tablet; TAKE 1 TABLET AT BEDTIME\par \par \par Discussion/Summary\par 1. ESRD: Now Off HD, \par 2. Afib- Now in sinus. \par 3. CAD- s/p CABG, LVEF 45-50% on recent TTE, Continue aspirin, metoprolol, and statin. Will plan for stress testing in the future to assess for any ischemia considering plans for evaluation of renal transplant. \par 4. AS/MR- s/p AVR and MVR, on ASA.

## 2022-04-01 NOTE — PHYSICAL EXAM
[Normal Breath Sounds] : Normal breath sounds [Normal Heart Sounds] : normal heart sounds [Alert] : alert [Oriented to Person] : oriented to person [Oriented to Place] : oriented to place [Oriented to Time] : oriented to time [Calm] : calm [Abdomen Tenderness] : ~T ~M No abdominal tenderness [Purpura] : no purpura  [Petechiae] : no petechiae [Skin Ulcer] : no ulcer [Skin Induration] : no induration [de-identified] : wdwn male in nad [de-identified] : non icteric  sclera [de-identified] : trachea  midline [de-identified] : soft / NT / ND. midline laparotomy wound 14 x 1-2 cm without infection   dry  with  scab  to  upper proximal site   no  discharge  no  bleeding  non  tender  to palpation  abdomen soft  no rigidity    debridement  of  scab    overall good granulation to incision sign with pink healthy skin  no discharge  no  bleeding\par  [de-identified] : macula ann pinyvettet rah all over  body excluding rash  non  tender to palaption  no discharge  no edema no excoriations

## 2022-04-01 NOTE — PHYSICAL EXAM
[General Appearance - Alert] : alert [General Appearance - In No Acute Distress] : in no acute distress [Sclera] : the sclera and conjunctiva were normal [PERRL With Normal Accommodation] : pupils were equal in size, round, and reactive to light [Extraocular Movements] : extraocular movements were intact [Outer Ear] : the ears and nose were normal in appearance [Oropharynx] : the oropharynx was normal [Neck Appearance] : the appearance of the neck was normal [Neck Cervical Mass (___cm)] : no neck mass was observed [Jugular Venous Distention Increased] : there was no jugular-venous distention [Thyroid Diffuse Enlargement] : the thyroid was not enlarged [Thyroid Nodule] : there were no palpable thyroid nodules [Auscultation Breath Sounds / Voice Sounds] : lungs were clear to auscultation bilaterally [Heart Rate And Rhythm] : heart rate was normal and rhythm regular [Heart Sounds] : normal S1 and S2 [Heart Sounds Gallop] : no gallops [Murmurs] : no murmurs [Heart Sounds Pericardial Friction Rub] : no pericardial rub [Full Pulse] : the pedal pulses are present [Edema] : there was no peripheral edema [Bowel Sounds] : normal bowel sounds [Abdomen Soft] : soft [Abdomen Tenderness] : non-tender [Abdomen Mass (___ Cm)] : no abdominal mass palpated [FreeTextEntry1] : Surgical Scar,  [Cervical Lymph Nodes Enlarged Posterior Bilaterally] : posterior cervical [Cervical Lymph Nodes Enlarged Anterior Bilaterally] : anterior cervical [Supraclavicular Lymph Nodes Enlarged Bilaterally] : supraclavicular [Axillary Lymph Nodes Enlarged Bilaterally] : axillary [Femoral Lymph Nodes Enlarged Bilaterally] : femoral [Inguinal Lymph Nodes Enlarged Bilaterally] : inguinal [No CVA Tenderness] : no ~M costovertebral angle tenderness [No Spinal Tenderness] : no spinal tenderness [Abnormal Walk] : normal gait [Nail Clubbing] : no clubbing  or cyanosis of the fingernails [Musculoskeletal - Swelling] : no joint swelling seen [Motor Tone] : muscle strength and tone were normal [Skin Color & Pigmentation] : normal skin color and pigmentation [Skin Turgor] : normal skin turgor [] : no rash [Deep Tendon Reflexes (DTR)] : deep tendon reflexes were 2+ and symmetric [Sensation] : the sensory exam was normal to light touch and pinprick [No Focal Deficits] : no focal deficits [Oriented To Time, Place, And Person] : oriented to person, place, and time [Impaired Insight] : insight and judgment were intact [Affect] : the affect was normal

## 2022-04-05 ENCOUNTER — RESULT REVIEW (OUTPATIENT)
Age: 62
End: 2022-04-05

## 2022-04-07 ENCOUNTER — APPOINTMENT (OUTPATIENT)
Dept: TRAUMA SURGERY | Facility: CLINIC | Age: 62
End: 2022-04-07

## 2022-04-08 NOTE — HISTORY OF PRESENT ILLNESS
[FreeTextEntry1] : Mr. Cummins is a pleasant 61 year old male here for follow up today. He has a history of atrial fibrillation/flutter s/p catheter ablation, aortic dissection s/p repair, CVA with residual left weakness, bowel ischemia s/p resection, ESRD on dialysis, HTN, AVMs s/p cauterization, CAD s/p PCI/CABG, mitral valve s/p repair, and mild cardiomyopathy (EF 45-50%). \par \par The patient underwent catheter ablation for symptomatic atrial fibrillation/flutter on 9/14/2021 (PVI, CTI, mitral flutter line). He has done well from an arrhythmia standpoint since then with no recurrences. He states that he underwent bowel surgery recently for small bowel obstruction. He has been off dialysis and notes some lower extremity swelling. He states that he was on torsemide 20 mg on alternate days in the past and did well on it. He denies any palpitations, chest pain, or syncope. \par

## 2022-04-08 NOTE — CARDIOLOGY SUMMARY
[de-identified] : 3/2/22: sinus rhythm with PVCs [de-identified] : 11/10/21: Frequent PVCs, no AF [de-identified] : TTE: EF 45-50%, severely enlarged LA, moderate RV dysfunction

## 2022-04-08 NOTE — DISCUSSION/SUMMARY
[FreeTextEntry1] : In summary, Mr. Cummins is a 61 year old male with a history of atrial fibrillation/flutter s/p RFA (PVI, CTI, mitral flutter ablation), aortic dissection s/p repair, ESRD, CAD s/p CABG, mitral valve disease s/p repair, small bowel ischemia/obstruction s/p resection, HTN and asymptomatic frequent PVCs. He continues to maintain sinus rhythm post ablation. He will continue eliquis and toprol. The patient states that he is off dialysis now and is making urine. I will resume torsemide 20 mg every other day (which he has used in the past with good response). The patient patient is seeing his nephrologist next month for adjustment of medical therapy as needed.

## 2022-04-14 NOTE — PROGRESS NOTE ADULT - PROBLEM SELECTOR PLAN 2
Tolerating oral intake and being up on side of bed. Discharge instructions given to patient and mother. Verbalize understanding. Bravo monitor remains in place. GI following  Trend H/H  Will proceed with flex sig ( subtotal colectomy) and EGD on Monday.

## 2022-04-21 ENCOUNTER — APPOINTMENT (OUTPATIENT)
Dept: GASTROENTEROLOGY | Facility: CLINIC | Age: 62
End: 2022-04-21

## 2022-05-03 NOTE — ED ADULT TRIAGE NOTE - SOURCE OF INFORMATION
Initiate Treatment: To lower legs: start 5-FU + calcipotriene BID x 1-2 weeks as tolerated, see handout \\n\\nAfter completing treatment, can use topical clobetasol BID prn for up to 2 consecutive weeks to treat erythema or irritation, stop when improved
Detail Level: Zone
Render In Strict Bullet Format?: No
Initiate Treatment: Daily wound care with vaseline + bandage
Patient

## 2022-05-12 ENCOUNTER — APPOINTMENT (OUTPATIENT)
Dept: TRAUMA SURGERY | Facility: CLINIC | Age: 62
End: 2022-05-12
Payer: MEDICARE

## 2022-05-12 VITALS
DIASTOLIC BLOOD PRESSURE: 79 MMHG | SYSTOLIC BLOOD PRESSURE: 177 MMHG | HEART RATE: 88 BPM | TEMPERATURE: 97.6 F | OXYGEN SATURATION: 98 % | HEIGHT: 68 IN | RESPIRATION RATE: 14 BRPM

## 2022-05-12 DIAGNOSIS — Z98.890 OTHER SPECIFIED POSTPROCEDURAL STATES: ICD-10-CM

## 2022-05-12 DIAGNOSIS — K56.51 INTESTINAL ADHESIONS [BANDS], WITH PARTIAL OBSTRUCTION: ICD-10-CM

## 2022-05-12 PROCEDURE — 99213 OFFICE O/P EST LOW 20 MIN: CPT

## 2022-05-13 PROBLEM — K56.51 INTESTINAL ADHESIONS WITH PARTIAL OBSTRUCTION: Status: ACTIVE | Noted: 2021-12-14

## 2022-05-13 PROBLEM — Z98.890 S/P EXPLORATORY LAPAROTOMY: Status: ACTIVE | Noted: 2022-05-13

## 2022-05-13 NOTE — HISTORY OF PRESENT ILLNESS
[FreeTextEntry1] : Patient presents  to [facility]  clinic for post op exam  s/p exploratory laparotomy/ lysis of adhesions   for  SBO   Patient at time  of  this  exam  denies  any fevers  no  chills   no  n/v/d   nl  bm nl urination   No  acute  abdominal  pain    Patient s rash being cared  for  by dermatology for psoriasis  [de-identified] : Ex-lap / evacuation of mesenteric hematomas on 10/31.\par Discharged to [facility] on 11/10.\par .\par  [de-identified] : tolerating regular diet without nausea, vomiting or abdominal pain.\par no fevers or chills.\par

## 2022-05-13 NOTE — REVIEW OF SYSTEMS
[Dysuria] : no dysuria [Incontinence] : no incontinence [Hesitancy] : no urinary hesitancy [Nocturia] : no nocturia [Genital Lesion] : no genital lesions [Testicular Pain] : no testicular pain [Negative] : Psychiatric [FreeTextEntry8] : s/p  ex lap/ RENEA/sbo [de-identified] : mid line  abdominal incision with  scab

## 2022-05-13 NOTE — ASSESSMENT
[FreeTextEntry1] : RTC prn \par Maintain good bowel regimen\par Moisturize wound with Nutraderm\par F/u GI\par Discussion with patient   All questions answered  Any acute symptoms and or concerns patient understands  to call back office  and  or  go  directly to [facility] ED\par

## 2022-05-13 NOTE — PHYSICAL EXAM
[Normal Breath Sounds] : Normal breath sounds [Normal Heart Sounds] : normal heart sounds [Abdomen Tenderness] : ~T ~M No abdominal tenderness [Purpura] : no purpura  [Petechiae] : no petechiae [Skin Ulcer] : no ulcer [Skin Induration] : no induration [Alert] : alert [Oriented to Person] : oriented to person [Oriented to Place] : oriented to place [Oriented to Time] : oriented to time [Calm] : calm [de-identified] : wdwn male in nad [de-identified] : non icteric  sclera [de-identified] : trachea  midline [de-identified] : soft / NT / ND. midline laparotomy wound 14 x 1-2 cm without infection   dry scaly skin  to  upper proximal site   no  discharge  no  bleeding  non  tender  to palpation  abdomen soft  no rigidity    debridement  of  scab    overall good granulation to incision sign with pink healthy skin  no discharge  no  bleeding\par  [de-identified] : macula papular pinpoint rah all over  body excluding rash  non  tender to palpation  no discharge  no edema no excoriations

## 2022-05-17 ENCOUNTER — NON-APPOINTMENT (OUTPATIENT)
Age: 62
End: 2022-05-17

## 2022-05-25 ENCOUNTER — NON-APPOINTMENT (OUTPATIENT)
Age: 62
End: 2022-05-25

## 2022-05-31 ENCOUNTER — NON-APPOINTMENT (OUTPATIENT)
Age: 62
End: 2022-05-31

## 2022-05-31 ENCOUNTER — APPOINTMENT (OUTPATIENT)
Dept: CARDIOLOGY | Facility: CLINIC | Age: 62
End: 2022-05-31
Payer: MEDICARE

## 2022-05-31 VITALS
SYSTOLIC BLOOD PRESSURE: 188 MMHG | DIASTOLIC BLOOD PRESSURE: 74 MMHG | OXYGEN SATURATION: 98 % | HEART RATE: 85 BPM | TEMPERATURE: 98.1 F | HEIGHT: 68 IN | BODY MASS INDEX: 27.28 KG/M2 | WEIGHT: 180 LBS

## 2022-05-31 VITALS — DIASTOLIC BLOOD PRESSURE: 72 MMHG | SYSTOLIC BLOOD PRESSURE: 180 MMHG

## 2022-05-31 PROCEDURE — 93000 ELECTROCARDIOGRAM COMPLETE: CPT

## 2022-05-31 PROCEDURE — 99215 OFFICE O/P EST HI 40 MIN: CPT

## 2022-06-10 ENCOUNTER — APPOINTMENT (OUTPATIENT)
Dept: CARDIOLOGY | Facility: CLINIC | Age: 62
End: 2022-06-10
Payer: MEDICARE

## 2022-06-10 PROCEDURE — 93975 VASCULAR STUDY: CPT

## 2022-06-10 PROCEDURE — ZZZZZ: CPT

## 2022-06-16 ENCOUNTER — NON-APPOINTMENT (OUTPATIENT)
Age: 62
End: 2022-06-16

## 2022-06-21 ENCOUNTER — APPOINTMENT (OUTPATIENT)
Dept: VASCULAR SURGERY | Facility: CLINIC | Age: 62
End: 2022-06-21
Payer: MEDICARE

## 2022-06-21 ENCOUNTER — APPOINTMENT (OUTPATIENT)
Dept: CARDIOLOGY | Facility: CLINIC | Age: 62
End: 2022-06-21
Payer: MEDICARE

## 2022-06-21 VITALS
HEIGHT: 68 IN | HEART RATE: 70 BPM | OXYGEN SATURATION: 98 % | TEMPERATURE: 97.8 F | SYSTOLIC BLOOD PRESSURE: 102 MMHG | RESPIRATION RATE: 16 BRPM | BODY MASS INDEX: 28.04 KG/M2 | WEIGHT: 185 LBS | DIASTOLIC BLOOD PRESSURE: 56 MMHG

## 2022-06-21 VITALS
TEMPERATURE: 98.6 F | BODY MASS INDEX: 27.9 KG/M2 | RESPIRATION RATE: 14 BRPM | SYSTOLIC BLOOD PRESSURE: 140 MMHG | HEART RATE: 76 BPM | DIASTOLIC BLOOD PRESSURE: 76 MMHG | HEIGHT: 68 IN | OXYGEN SATURATION: 98 % | WEIGHT: 184.1 LBS

## 2022-06-21 PROCEDURE — 99215 OFFICE O/P EST HI 40 MIN: CPT

## 2022-06-21 PROCEDURE — 93880 EXTRACRANIAL BILAT STUDY: CPT

## 2022-06-21 PROCEDURE — 99212 OFFICE O/P EST SF 10 MIN: CPT

## 2022-06-21 RX ORDER — METOPROLOL SUCCINATE 50 MG/1
50 TABLET, EXTENDED RELEASE ORAL DAILY
Qty: 90 | Refills: 3 | Status: DISCONTINUED | COMMUNITY
Start: 2021-07-08 | End: 2022-06-21

## 2022-06-21 NOTE — ASSESSMENT
[FreeTextEntry1] : 62 year old man with CKD (previously on chronic HD but has been off since October 2021), hypertension, atherosclerotic renal artery stenosis and carotid stenosis\par 1. Renal artery stenosis: Recent renal duplex- showed right renal stenosis >60% stenosis.\par I have explained to him that renal artery revascularization in the setting of atherosclerotic stenosis does not lead to significant improvement in blood pressure control or affect long term renal function.\par As his blood pressure is controlled on 2 medications, I recommend continued medical therapy.\par If he develops very difficult to control hypertension or rapidly worsened renal function, will reconsider revascularization. Continue antiplatelet and statin therapy\par Will follow up in 3 months\par \par 2. Carotid stenosis: Patient reports h/o carotid stenosis. no recent TIA, CVA or amaurosis.\par Carotid duplex performed today shows < 50% stenosis bilaterally

## 2022-06-21 NOTE — PHYSICAL EXAM
[JVD] : no jugular venous distention  [Normal Breath Sounds] : Normal breath sounds [2+] : left 2+ [Ankle Swelling (On Exam)] : not present [Varicose Veins Of Lower Extremities] : not present [] : not present [Abdomen Masses] : No abdominal masses [Abdomen Tenderness] : ~T ~M No abdominal tenderness [No Rash or Lesion] : No rash or lesion [Alert] : alert [Oriented to Person] : oriented to person [Oriented to Place] : oriented to place [Oriented to Time] : oriented to time [Calm] : calm [de-identified] : Well appearing [FreeTextEntry1] : Patent AV graft with good thrill\par No arm swelling\par Pedal pulses strongly palpable bilaterally

## 2022-06-21 NOTE — HISTORY OF PRESENT ILLNESS
[FreeTextEntry1] : 61 year old man with history of Type A aortic dissection s/p repair in 2010 and recent CABG x2, Mitral valve and aortic valve replacement in November 2020. Patient has been on HD via a right IJ tunneled HD catheter since November 2020. Patient referred here for long term AV access.\par Patient is right hand dominant. he had a previous stroke and has some residual left arm weakness\par Patient s/p left forearm loop AV graft on 1/2/21 [de-identified] : Patient has been off hemodialysis since October 2021\par Most recent serum Cr in 04/22 was 1.6.\par Recent renal artery duplex US reveals  bilateral renal artery stenosis (>70% in right renal artery).\par His blood pressure is reasonable well controlled on 2 agents.

## 2022-06-22 ENCOUNTER — APPOINTMENT (OUTPATIENT)
Dept: GASTROENTEROLOGY | Facility: CLINIC | Age: 62
End: 2022-06-22
Payer: MEDICARE

## 2022-06-22 VITALS
WEIGHT: 185 LBS | DIASTOLIC BLOOD PRESSURE: 60 MMHG | TEMPERATURE: 98 F | HEART RATE: 80 BPM | OXYGEN SATURATION: 98 % | BODY MASS INDEX: 28.04 KG/M2 | RESPIRATION RATE: 16 BRPM | HEIGHT: 68 IN | SYSTOLIC BLOOD PRESSURE: 105 MMHG

## 2022-06-22 DIAGNOSIS — K62.5 HEMORRHAGE OF ANUS AND RECTUM: ICD-10-CM

## 2022-06-22 PROCEDURE — 99204 OFFICE O/P NEW MOD 45 MIN: CPT

## 2022-06-22 PROCEDURE — 99214 OFFICE O/P EST MOD 30 MIN: CPT

## 2022-06-22 RX ORDER — OMEPRAZOLE 40 MG/1
40 CAPSULE, DELAYED RELEASE ORAL
Qty: 30 | Refills: 2 | Status: DISCONTINUED | COMMUNITY
Start: 2022-06-22 | End: 2022-06-22

## 2022-06-22 NOTE — ASSESSMENT
[FreeTextEntry1] : Patient is a 62 year year old male, with PMH of aortic dissection s/p repair in 2020 with CABG x 2, CHF, mitral and aortic valve replacement, a.fib, h/o ESRD on HD but off HD since October 2021, s/p colectomy with ostomy and reversal >10 years ago and recent exploratory lap/lysis of adhesions for SBO a few months ago, who presents for evaluation of rectal bleeding x 1.5 weeks. \par \par Pt's most recent EGD/colonoscopy was in 2020. Has chronic anemia and now with BRBPR, refusing DIONE and hemoccult today. Unable to assess rectum. \par \par Will need eventual colonoscopy and possible EGD, particularly if Hgb low on upcoming labs. \par \par Continue to hold eliquis, as directed by cardiology x 10 days, then resume. Will need cardiac clearance to hold Eliquis in the future when procedures are scheduled. Procedures to be done at [facility], pending clearance. \par \par PT with chronic diarrhea, requesting refill of his Lomotil.

## 2022-06-22 NOTE — PHYSICAL EXAM
[General Appearance - Alert] : alert [General Appearance - In No Acute Distress] : in no acute distress [Sclera] : the sclera and conjunctiva were normal [PERRL With Normal Accommodation] : pupils were equal in size, round, and reactive to light [Extraocular Movements] : extraocular movements were intact [Outer Ear] : the ears and nose were normal in appearance [Neck Appearance] : the appearance of the neck was normal [] : no respiratory distress [Auscultation Breath Sounds / Voice Sounds] : lungs were clear to auscultation bilaterally [Heart Rate And Rhythm] : heart rate was normal and rhythm regular [Heart Sounds] : normal S1 and S2 [Heart Sounds Gallop] : no gallops [Murmurs] : no murmurs [Heart Sounds Pericardial Friction Rub] : no pericardial rub [Bowel Sounds] : normal bowel sounds [Abdomen Soft] : soft [Abdomen Tenderness] : non-tender [Patient Refused] : rectal exam was refused by the patient [No Focal Deficits] : no focal deficits [Oriented To Time, Place, And Person] : oriented to person, place, and time [Impaired Insight] : insight and judgment were intact [Affect] : the affect was normal

## 2022-06-22 NOTE — HISTORY OF PRESENT ILLNESS
[de-identified] : Patient is a 62 year year old male, with PMH of aortic dissection s/p repair in 2020 with CABG x 2, CHF, mitral and aortic valve replacement, a.fib, h/o ESRD on HD but off HD since October 2021, s/p colectomy with ostomy and reversal >10 years ago and recent exploratory lap/lysis of adhesions for SBO a few months ago, who presents for evaluation of rectal bleeding x 1.5 weeks. \par odalis Pt had a hard BM then had BRBPR when wiping and in the toilet water. Symptoms persisted for a few days, now only with spotting when he wipes. He was seen by cardiology yesterday and told to hold Eliquis x 10 days then restart. He did not take his Eliquis today. \kera jacobo Pt has been admitted to the hospital on various occasions. He was seen by GI for anemia in November 2020 and underwent EGD/colonoscopy at that time which did not provide a source of bleeding at that time. He has a chronic h/o anemia, most recent labs done April 2021 showed Hgb 10.1 with normal MCV. He plans to have repeat CBC/CMP later this week. \kera jacobo Pt has chronic diarrhea and take Lomotil prn which helps control his diarrhea when he plans to go on long trips or leaving the house for long period of time. \par odalis Pt has a h/o CKD and was on HD in the past, stopped dialysis in October 2021. Most recent labs in April 2022 showed Cr 1.6. Follows with nephrology. \par \par Patient denies any significant pulmonary conditions. \par \par Patient denies pyrosis, dysphagia, abdominal pain, nausea, vomiting, or unexplained weight loss.\par

## 2022-06-27 ENCOUNTER — INPATIENT (INPATIENT)
Facility: HOSPITAL | Age: 62
LOS: 2 days | Discharge: HOME | DRG: 394 | End: 2022-06-30
Attending: HOSPITALIST | Admitting: INTERNAL MEDICINE
Payer: MEDICARE

## 2022-06-27 ENCOUNTER — NON-APPOINTMENT (OUTPATIENT)
Age: 62
End: 2022-06-27

## 2022-06-27 VITALS
HEART RATE: 47 BPM | RESPIRATION RATE: 16 BRPM | TEMPERATURE: 98 F | OXYGEN SATURATION: 99 % | WEIGHT: 184.97 LBS | DIASTOLIC BLOOD PRESSURE: 48 MMHG | HEIGHT: 68 IN | SYSTOLIC BLOOD PRESSURE: 114 MMHG

## 2022-06-27 DIAGNOSIS — D62 ACUTE POSTHEMORRHAGIC ANEMIA: ICD-10-CM

## 2022-06-27 DIAGNOSIS — Z95.1 PRESENCE OF AORTOCORONARY BYPASS GRAFT: Chronic | ICD-10-CM

## 2022-06-27 DIAGNOSIS — Z95.2 PRESENCE OF PROSTHETIC HEART VALVE: Chronic | ICD-10-CM

## 2022-06-27 DIAGNOSIS — Z86.79 PERSONAL HISTORY OF OTHER DISEASES OF THE CIRCULATORY SYSTEM: Chronic | ICD-10-CM

## 2022-06-27 DIAGNOSIS — I77.0 ARTERIOVENOUS FISTULA, ACQUIRED: Chronic | ICD-10-CM

## 2022-06-27 DIAGNOSIS — Z90.49 ACQUIRED ABSENCE OF OTHER SPECIFIED PARTS OF DIGESTIVE TRACT: Chronic | ICD-10-CM

## 2022-06-27 DIAGNOSIS — K92.2 GASTROINTESTINAL HEMORRHAGE, UNSPECIFIED: ICD-10-CM

## 2022-06-27 LAB
ALBUMIN SERPL ELPH-MCNC: 3.7 G/DL — SIGNIFICANT CHANGE UP (ref 3.3–5.2)
ALP SERPL-CCNC: 99 U/L — SIGNIFICANT CHANGE UP (ref 40–120)
ALT FLD-CCNC: 9 U/L — SIGNIFICANT CHANGE UP
ANION GAP SERPL CALC-SCNC: 10 MMOL/L — SIGNIFICANT CHANGE UP (ref 5–17)
APTT BLD: 38.4 SEC — HIGH (ref 27.5–35.5)
AST SERPL-CCNC: 9 U/L — SIGNIFICANT CHANGE UP
BASOPHILS # BLD AUTO: 0.04 K/UL — SIGNIFICANT CHANGE UP (ref 0–0.2)
BASOPHILS NFR BLD AUTO: 0.6 % — SIGNIFICANT CHANGE UP (ref 0–2)
BILIRUB SERPL-MCNC: 0.5 MG/DL — SIGNIFICANT CHANGE UP (ref 0.4–2)
BLD GP AB SCN SERPL QL: SIGNIFICANT CHANGE UP
BUN SERPL-MCNC: 29.4 MG/DL — HIGH (ref 8–20)
CALCIUM SERPL-MCNC: 8.8 MG/DL — SIGNIFICANT CHANGE UP (ref 8.6–10.2)
CHLORIDE SERPL-SCNC: 105 MMOL/L — SIGNIFICANT CHANGE UP (ref 98–107)
CO2 SERPL-SCNC: 22 MMOL/L — SIGNIFICANT CHANGE UP (ref 22–29)
CREAT SERPL-MCNC: 2.3 MG/DL — HIGH (ref 0.5–1.3)
EGFR: 32 ML/MIN/1.73M2 — LOW
EOSINOPHIL # BLD AUTO: 0.35 K/UL — SIGNIFICANT CHANGE UP (ref 0–0.5)
EOSINOPHIL NFR BLD AUTO: 5.6 % — SIGNIFICANT CHANGE UP (ref 0–6)
GLUCOSE SERPL-MCNC: 101 MG/DL — HIGH (ref 70–99)
HCT VFR BLD CALC: 24.6 % — LOW (ref 39–50)
HCT VFR BLD CALC: 26.5 % — LOW (ref 39–50)
HGB BLD-MCNC: 7.8 G/DL — LOW (ref 13–17)
HGB BLD-MCNC: 8.3 G/DL — LOW (ref 13–17)
IMM GRANULOCYTES NFR BLD AUTO: 0.3 % — SIGNIFICANT CHANGE UP (ref 0–1.5)
INR BLD: 1.4 RATIO — HIGH (ref 0.88–1.16)
INR PPP: 1.26 RATIO
LYMPHOCYTES # BLD AUTO: 1.07 K/UL — SIGNIFICANT CHANGE UP (ref 1–3.3)
LYMPHOCYTES # BLD AUTO: 17 % — SIGNIFICANT CHANGE UP (ref 13–44)
MCHC RBC-ENTMCNC: 29.3 PG — SIGNIFICANT CHANGE UP (ref 27–34)
MCHC RBC-ENTMCNC: 29.5 PG — SIGNIFICANT CHANGE UP (ref 27–34)
MCHC RBC-ENTMCNC: 31.3 GM/DL — LOW (ref 32–36)
MCHC RBC-ENTMCNC: 31.7 GM/DL — LOW (ref 32–36)
MCV RBC AUTO: 93.2 FL — SIGNIFICANT CHANGE UP (ref 80–100)
MCV RBC AUTO: 93.6 FL — SIGNIFICANT CHANGE UP (ref 80–100)
MONOCYTES # BLD AUTO: 0.52 K/UL — SIGNIFICANT CHANGE UP (ref 0–0.9)
MONOCYTES NFR BLD AUTO: 8.3 % — SIGNIFICANT CHANGE UP (ref 2–14)
NEUTROPHILS # BLD AUTO: 4.3 K/UL — SIGNIFICANT CHANGE UP (ref 1.8–7.4)
NEUTROPHILS NFR BLD AUTO: 68.2 % — SIGNIFICANT CHANGE UP (ref 43–77)
OB PNL STL: POSITIVE
PLATELET # BLD AUTO: 181 K/UL — SIGNIFICANT CHANGE UP (ref 150–400)
PLATELET # BLD AUTO: 187 K/UL — SIGNIFICANT CHANGE UP (ref 150–400)
POTASSIUM SERPL-MCNC: 4.1 MMOL/L — SIGNIFICANT CHANGE UP (ref 3.5–5.3)
POTASSIUM SERPL-SCNC: 4.1 MMOL/L — SIGNIFICANT CHANGE UP (ref 3.5–5.3)
PROT SERPL-MCNC: 6.7 G/DL — SIGNIFICANT CHANGE UP (ref 6.6–8.7)
PROTHROM AB SERPL-ACNC: 16.3 SEC — HIGH (ref 10.5–13.4)
PT BLD: 14.8 SEC
RAPID RVP RESULT: SIGNIFICANT CHANGE UP
RBC # BLD: 2.64 M/UL — LOW (ref 4.2–5.8)
RBC # BLD: 2.83 M/UL — LOW (ref 4.2–5.8)
RBC # FLD: 13.9 % — SIGNIFICANT CHANGE UP (ref 10.3–14.5)
RBC # FLD: 14 % — SIGNIFICANT CHANGE UP (ref 10.3–14.5)
SARS-COV-2 RNA SPEC QL NAA+PROBE: SIGNIFICANT CHANGE UP
SODIUM SERPL-SCNC: 137 MMOL/L — SIGNIFICANT CHANGE UP (ref 135–145)
WBC # BLD: 6.3 K/UL — SIGNIFICANT CHANGE UP (ref 3.8–10.5)
WBC # BLD: 7.57 K/UL — SIGNIFICANT CHANGE UP (ref 3.8–10.5)
WBC # FLD AUTO: 6.3 K/UL — SIGNIFICANT CHANGE UP (ref 3.8–10.5)
WBC # FLD AUTO: 7.57 K/UL — SIGNIFICANT CHANGE UP (ref 3.8–10.5)

## 2022-06-27 PROCEDURE — 99285 EMERGENCY DEPT VISIT HI MDM: CPT

## 2022-06-27 PROCEDURE — 99223 1ST HOSP IP/OBS HIGH 75: CPT

## 2022-06-27 PROCEDURE — 93010 ELECTROCARDIOGRAM REPORT: CPT

## 2022-06-27 RX ORDER — ACETAMINOPHEN 500 MG
650 TABLET ORAL EVERY 6 HOURS
Refills: 0 | Status: DISCONTINUED | OUTPATIENT
Start: 2022-06-27 | End: 2022-06-30

## 2022-06-27 RX ORDER — TAMSULOSIN HYDROCHLORIDE 0.4 MG/1
0.4 CAPSULE ORAL AT BEDTIME
Refills: 0 | Status: DISCONTINUED | OUTPATIENT
Start: 2022-06-27 | End: 2022-06-30

## 2022-06-27 RX ORDER — TRAMADOL HYDROCHLORIDE 50 MG/1
25 TABLET ORAL EVERY 4 HOURS
Refills: 0 | Status: DISCONTINUED | OUTPATIENT
Start: 2022-06-27 | End: 2022-06-30

## 2022-06-27 RX ORDER — AMLODIPINE BESYLATE 2.5 MG/1
5 TABLET ORAL DAILY
Refills: 0 | Status: DISCONTINUED | OUTPATIENT
Start: 2022-06-27 | End: 2022-06-30

## 2022-06-27 RX ORDER — METOPROLOL TARTRATE 50 MG
25 TABLET ORAL DAILY
Refills: 0 | Status: DISCONTINUED | OUTPATIENT
Start: 2022-06-27 | End: 2022-06-30

## 2022-06-27 RX ORDER — PANTOPRAZOLE SODIUM 20 MG/1
40 TABLET, DELAYED RELEASE ORAL
Refills: 0 | Status: DISCONTINUED | OUTPATIENT
Start: 2022-06-27 | End: 2022-06-30

## 2022-06-27 RX ORDER — ATORVASTATIN CALCIUM 80 MG/1
40 TABLET, FILM COATED ORAL AT BEDTIME
Refills: 0 | Status: DISCONTINUED | OUTPATIENT
Start: 2022-06-27 | End: 2022-06-30

## 2022-06-27 RX ORDER — LEVETIRACETAM 250 MG/1
750 TABLET, FILM COATED ORAL
Refills: 0 | Status: DISCONTINUED | OUTPATIENT
Start: 2022-06-27 | End: 2022-06-30

## 2022-06-27 RX ADMIN — LEVETIRACETAM 750 MILLIGRAM(S): 250 TABLET, FILM COATED ORAL at 18:10

## 2022-06-27 RX ADMIN — TRAMADOL HYDROCHLORIDE 25 MILLIGRAM(S): 50 TABLET ORAL at 21:56

## 2022-06-27 RX ADMIN — AMLODIPINE BESYLATE 5 MILLIGRAM(S): 2.5 TABLET ORAL at 21:56

## 2022-06-27 RX ADMIN — PANTOPRAZOLE SODIUM 40 MILLIGRAM(S): 20 TABLET, DELAYED RELEASE ORAL at 18:10

## 2022-06-27 RX ADMIN — TAMSULOSIN HYDROCHLORIDE 0.4 MILLIGRAM(S): 0.4 CAPSULE ORAL at 21:56

## 2022-06-27 RX ADMIN — ATORVASTATIN CALCIUM 40 MILLIGRAM(S): 80 TABLET, FILM COATED ORAL at 21:56

## 2022-06-27 NOTE — ED ADULT NURSE NOTE - OBJECTIVE STATEMENT
AAOx3 c/o needing "blood transfusion." C/o weakness and fatigue. Patient pale. Will CTM. Safety maintained, Denies NVD, CP, or SOB.

## 2022-06-27 NOTE — ED PROVIDER NOTE - OBJECTIVE STATEMENT
61yo male with PMH congestive heart failure CVA, ESRD on HD Tu/Th/Sa, GIB, aortic valve insufficiency, atrial fibrillation on eliquis presenting with feeling lightheaded. patient states that he had episode of several bright red stools last week, last episode on Wednesday. Had labs done on Saturday which showed anemia (unsure level). No shortness of breath. 63yo male with PMH congestive heart failure CVA, ESRD on HD Tu/Th/Sa, GIB, aortic valve insufficiency, atrial fibrillation on eliquis presenting with feeling lightheaded. patient states that he had episode of several bright red stools last week, last episode on Wednesday. Had labs done on Saturday which showed anemia (unsure level). No shortness of breath. States he has history of polyps in past and has had GIB requiring blood transfusion. Supposed to have outpatient colonoscopy and endoscopy after he obtains cardiac clearance. 63yo male with PMH congestive heart failure CVA, CKD, GIB, aortic valve insufficiency, atrial fibrillation on eliquis presenting with feeling lightheaded. patient states that he had episode of several bright red stools last week, last episode on Wednesday. Had labs done on Saturday which showed anemia (unsure level). No shortness of breath. States he has history of polyps in past and has had GIB requiring blood transfusion. Supposed to have outpatient colonoscopy and endoscopy after he obtains cardiac clearance.

## 2022-06-27 NOTE — CONSULT NOTE ADULT - PROBLEM SELECTOR RECOMMENDATION 9
Rectal bleeding now resolved as per patient. Normocytic anemia of unclear etiology. Pt underwent Flex sigmoidoscopy 03/21 revealing ileorectal anastomosis with AVMs around the site.   Last EGD/ Colonoscopy with Dr. Nieto Nov/2020 revealing hiatal hernia, unremarkable colonoscopy.   Hemoglobin on admission 7.8gm. INR 1.40. Patient refusing rectal exam. No evidence of overt GI bleeding.     Plan:  Will need EGD/Colonoscopy tentative Wednesday pending cardiology clearance.   Continue to monitor CBC, transfuse as needed  Can have reg dinner, Clear liquid diet tomorrow.   Will order Bowel prep tomorrow  Trend CMP, Coags   Continue to hold Eliquis Rectal bleeding now resolved as per patient. Normocytic anemia of unclear etiology. Pt underwent Flex sigmoidoscopy 03/21 revealing ileorectal anastomosis with AVMs around the site.   Last EGD/ Colonoscopy with Dr. Nieto Nov/2020 revealing hiatal hernia, unremarkable colonoscopy.   Hemoglobin on admission 7.8gm. INR 1.40. Patient refusing rectal exam. No evidence of overt GI bleeding.     Plan:  Will need EGD/Colonoscopy tentative Wednesday pending cardiology clearance.   Continue to monitor CBC, transfuse as needed  Can have reg dinner, Clear liquid diet tomorrow.   Will order Bowel prep tomorrow  Trend CMP, Coags   Continue to hold Eliquis  Please obtain Iron panel Rectal bleeding now resolved as per patient. Normocytic anemia of unclear etiology. Pt underwent Flex sigmoidoscopy 03/21 revealing ileorectal anastomosis with AVMs around the site.   Last EGD/ Colonoscopy with Dr. Nieto Nov/2020 revealing hiatal hernia, unremarkable colonoscopy.   Hemoglobin on admission 7.8gm. INR 1.40. Patient refusing rectal exam. No evidence of overt GI bleeding.     Plan:  Will need EGD/Colonoscopy tentative Wednesday pending cardiology clearance.   Continue to monitor CBC, transfuse as needed  Can have Clear liquid diet    Will order Bowel prep tomorrow  Trend CMP, Coags   Continue to hold Eliquis  Please obtain Iron panel Rectal bleeding now resolved as per patient. Normocytic anemia of unclear etiology. Pt underwent Flex sigmoidoscopy 03/21 revealing ileorectal anastomosis with AVMs around the site.   Last EGD/ Colonoscopy with Dr. Nieto Nov/2020 revealing hiatal hernia, unremarkable colonoscopy.   Hemoglobin on admission 7.8gm. INR 1.40. Patient refusing rectal exam. No evidence of overt GI bleeding.     Plan:  EGD/Colonoscopy tentative Wednesday pending cardiology clearance.   Continue to monitor CBC, transfuse as needed  Can have Clear liquid diet    Will order Bowel prep tomorrow  Trend CMP, Coags   Continue to hold Eliquis  Please obtain Iron panel

## 2022-06-27 NOTE — ED PROVIDER NOTE - NSICDXPASTSURGICALHX_GEN_ALL_CORE_FT
PAST SURGICAL HISTORY:  AV fistula LUE    H/O aortic dissection Type A; emergent repair 2010    H/O colectomy     S/P AVR (aortic valve replacement) (t)AVR 11/2020 Dr Butler    S/P MVR (mitral valve replacement) (t)MVR 11/2020 Dr Butler    Status post double vessel coronary artery bypass 11/2020 Dr Butler

## 2022-06-27 NOTE — ED PROVIDER NOTE - CLINICAL SUMMARY MEDICAL DECISION MAKING FREE TEXT BOX
Pt with symptomatic anemia in setting of recent GIB on eliquis- currently still bleeding- will check labs, likely will require transfusion and admission. Cynthia Schaeffer DO

## 2022-06-27 NOTE — ED ADULT TRIAGE NOTE - MEANS OF ARRIVAL
[ambulatory

## 2022-06-27 NOTE — ED ADULT TRIAGE NOTE - CHIEF COMPLAINT QUOTE
Sent in from Dr Frankel for blood transfusion. C/O dizziness, fatigue, weakness after having rectal bleeding for awhile.

## 2022-06-27 NOTE — ED PROVIDER NOTE - NSICDXPASTMEDICALHX_GEN_ALL_CORE_FT
PAST MEDICAL HISTORY:  Atrial fibrillation     CAD (coronary artery disease) s/p PCI 1998  and CABG x 2 11/2020    CHF (congestive heart failure)     COVID-19 october 2020    CVA (cerebral vascular accident)     ESRD on dialysis Tu/Thurs/Sat    Former smoker     GIB (gastrointestinal bleeding)     H/O aortic dissection s/p repair (2010), surgery complicated by bowel ischemia s/p bowel resection and ostomy with reversal    H/O aortic valve insufficiency     History of cocaine use remote hx, currently sober    HTN (hypertension)     Hyperlipemia     Mitral regurgitation     Seizures last seizure 10 years ago

## 2022-06-27 NOTE — ED PROVIDER NOTE - PHYSICAL EXAMINATION
Gen: NAD, AOx3  Head: NCAT  HEENT: oral mucosa moist, normal conjunctiva, oropharynx clear without exudate or erythema  Lung: CTAB, no respiratory distress, no wheezing, rales, rhonchi  CV: normal s1/s2, rrr, no murmurs, Normal perfusion  Abd: soft, NTND  MSK: No edema, no visible deformities, full range of motion in all 4 extremities  Neuro: No focal neurologic deficits  Skin: No rash   Psych: normal affect   Rectal: Bright red blood present

## 2022-06-27 NOTE — ED ADULT TRIAGE NOTE - ACCOMPANIED BY
Sent patient an e-mail notifying her that her insurance will not cover her consult here at the [facility] in [place]. That she will have to sign a self pay form.    Elayne Adams       Self

## 2022-06-27 NOTE — ED PROVIDER NOTE - CARE PLAN
1 Principal Discharge DX:	Anemia due to acute blood loss  Secondary Diagnosis:	SANTOS (acute kidney injury)  Secondary Diagnosis:	GI bleed

## 2022-06-27 NOTE — H&P ADULT - HISTORY OF PRESENT ILLNESS
61 yo M w/ hx aortic dissection s/p repair (2010), surgery complicated by bowel ischemia s/p bowel resection and ostomy with reversal; bio AVR/ MVR, HFpEF, afib on eliquis, recent dc nov 2021 for SBO s/p lysis of adhesions referred to ER by cardiologist for outpatient labs with anemia. Patient noted rectal bleeding on weekend of june 18/19 which spontaneously stopped. He was evaluated by cardiologist and GI midweek, advised to stop eliquis and check labs.  Labs resulted yesterday and referred to er.  patient notes dizziness but no chest pain/sob. denies urinary discomfort. denies hematemesis     in Er, cr 2.3 and hg 7.8.  ER to transfuse 1 U PRBC

## 2022-06-27 NOTE — ED PROVIDER NOTE - PROGRESS NOTE DETAILS
Patient noted to be anemic, Hb 7.8 Will transfuse 1unit prbc's. GI consulted. Patient has been holding Eliquis for GIB and has not taken it since last week. Cynthia Schaeffer DO

## 2022-06-28 DIAGNOSIS — Z01.810 ENCOUNTER FOR PREPROCEDURAL CARDIOVASCULAR EXAMINATION: ICD-10-CM

## 2022-06-28 DIAGNOSIS — I25.10 ATHEROSCLEROTIC HEART DISEASE OF NATIVE CORONARY ARTERY WITHOUT ANGINA PECTORIS: ICD-10-CM

## 2022-06-28 DIAGNOSIS — I48.91 UNSPECIFIED ATRIAL FIBRILLATION: ICD-10-CM

## 2022-06-28 LAB
ALBUMIN SERPL ELPH-MCNC: 3.8 G/DL
ALP BLD-CCNC: 95 U/L
ALT SERPL-CCNC: 12 U/L
ANION GAP SERPL CALC-SCNC: 11 MMOL/L
ANION GAP SERPL CALC-SCNC: 11 MMOL/L — SIGNIFICANT CHANGE UP (ref 5–17)
AST SERPL-CCNC: 8 U/L
BASOPHILS # BLD AUTO: 0.03 K/UL
BASOPHILS NFR BLD AUTO: 0.5 %
BILIRUB SERPL-MCNC: 0.5 MG/DL
BUN SERPL-MCNC: 28.1 MG/DL — HIGH (ref 8–20)
BUN SERPL-MCNC: 30 MG/DL
CALCIUM SERPL-MCNC: 8.4 MG/DL — LOW (ref 8.6–10.2)
CALCIUM SERPL-MCNC: 9 MG/DL
CHLORIDE SERPL-SCNC: 108 MMOL/L
CHLORIDE SERPL-SCNC: 111 MMOL/L — HIGH (ref 98–107)
CO2 SERPL-SCNC: 20 MMOL/L — LOW (ref 22–29)
CO2 SERPL-SCNC: 22 MMOL/L
CREAT SERPL-MCNC: 1.95 MG/DL — HIGH (ref 0.5–1.3)
CREAT SERPL-MCNC: 2.36 MG/DL
EGFR: 31 ML/MIN/1.73M2
EGFR: 38 ML/MIN/1.73M2 — LOW
EOSINOPHIL # BLD AUTO: 0.44 K/UL
EOSINOPHIL NFR BLD AUTO: 7.6 %
FERRITIN SERPL-MCNC: 275 NG/ML — SIGNIFICANT CHANGE UP (ref 30–400)
GLUCOSE SERPL-MCNC: 114 MG/DL
GLUCOSE SERPL-MCNC: 87 MG/DL — SIGNIFICANT CHANGE UP (ref 70–99)
HCT VFR BLD CALC: 25.5 %
HCT VFR BLD CALC: 26.5 % — LOW (ref 39–50)
HGB BLD-MCNC: 7.8 G/DL
HGB BLD-MCNC: 8.5 G/DL — LOW (ref 13–17)
IMM GRANULOCYTES NFR BLD AUTO: 0.2 %
IRON SATN MFR SERPL: 15 % — LOW (ref 16–55)
IRON SATN MFR SERPL: 40 UG/DL — LOW (ref 59–158)
LYMPHOCYTES # BLD AUTO: 1.16 K/UL
LYMPHOCYTES NFR BLD AUTO: 20.1 %
MAN DIFF?: NORMAL
MCHC RBC-ENTMCNC: 29.7 PG
MCHC RBC-ENTMCNC: 29.9 PG — SIGNIFICANT CHANGE UP (ref 27–34)
MCHC RBC-ENTMCNC: 30.6 GM/DL
MCHC RBC-ENTMCNC: 32.1 GM/DL — SIGNIFICANT CHANGE UP (ref 32–36)
MCV RBC AUTO: 93.3 FL — SIGNIFICANT CHANGE UP (ref 80–100)
MCV RBC AUTO: 97 FL
MONOCYTES # BLD AUTO: 0.5 K/UL
MONOCYTES NFR BLD AUTO: 8.7 %
NEUTROPHILS # BLD AUTO: 3.63 K/UL
NEUTROPHILS NFR BLD AUTO: 62.9 %
PLATELET # BLD AUTO: 166 K/UL
PLATELET # BLD AUTO: 185 K/UL — SIGNIFICANT CHANGE UP (ref 150–400)
POTASSIUM SERPL-MCNC: 3.8 MMOL/L — SIGNIFICANT CHANGE UP (ref 3.5–5.3)
POTASSIUM SERPL-SCNC: 3.8 MMOL/L — SIGNIFICANT CHANGE UP (ref 3.5–5.3)
POTASSIUM SERPL-SCNC: 4.6 MMOL/L
PROT SERPL-MCNC: 6.3 G/DL
RBC # BLD: 2.63 M/UL
RBC # BLD: 2.84 M/UL — LOW (ref 4.2–5.8)
RBC # FLD: 14.2 %
RBC # FLD: 14.3 % — SIGNIFICANT CHANGE UP (ref 10.3–14.5)
SODIUM SERPL-SCNC: 140 MMOL/L
SODIUM SERPL-SCNC: 142 MMOL/L — SIGNIFICANT CHANGE UP (ref 135–145)
TIBC SERPL-MCNC: 260 UG/DL — SIGNIFICANT CHANGE UP (ref 220–430)
TRANSFERRIN SERPL-MCNC: 182 MG/DL — SIGNIFICANT CHANGE UP (ref 180–329)
WBC # BLD: 5.88 K/UL — SIGNIFICANT CHANGE UP (ref 3.8–10.5)
WBC # FLD AUTO: 5.77 K/UL
WBC # FLD AUTO: 5.88 K/UL — SIGNIFICANT CHANGE UP (ref 3.8–10.5)

## 2022-06-28 PROCEDURE — 99233 SBSQ HOSP IP/OBS HIGH 50: CPT | Mod: FS

## 2022-06-28 PROCEDURE — 99223 1ST HOSP IP/OBS HIGH 75: CPT | Mod: FS

## 2022-06-28 PROCEDURE — 99232 SBSQ HOSP IP/OBS MODERATE 35: CPT

## 2022-06-28 RX ORDER — LANOLIN ALCOHOL/MO/W.PET/CERES
3 CREAM (GRAM) TOPICAL AT BEDTIME
Refills: 0 | Status: DISCONTINUED | OUTPATIENT
Start: 2022-06-28 | End: 2022-06-30

## 2022-06-28 RX ORDER — SOD SULF/SODIUM/NAHCO3/KCL/PEG
4000 SOLUTION, RECONSTITUTED, ORAL ORAL ONCE
Refills: 0 | Status: COMPLETED | OUTPATIENT
Start: 2022-06-28 | End: 2022-06-28

## 2022-06-28 RX ADMIN — Medication 4000 MILLILITER(S): at 21:05

## 2022-06-28 RX ADMIN — PANTOPRAZOLE SODIUM 40 MILLIGRAM(S): 20 TABLET, DELAYED RELEASE ORAL at 17:49

## 2022-06-28 RX ADMIN — LEVETIRACETAM 750 MILLIGRAM(S): 250 TABLET, FILM COATED ORAL at 17:49

## 2022-06-28 RX ADMIN — ATORVASTATIN CALCIUM 40 MILLIGRAM(S): 80 TABLET, FILM COATED ORAL at 22:36

## 2022-06-28 RX ADMIN — LEVETIRACETAM 750 MILLIGRAM(S): 250 TABLET, FILM COATED ORAL at 06:03

## 2022-06-28 RX ADMIN — TAMSULOSIN HYDROCHLORIDE 0.4 MILLIGRAM(S): 0.4 CAPSULE ORAL at 22:37

## 2022-06-28 RX ADMIN — AMLODIPINE BESYLATE 5 MILLIGRAM(S): 2.5 TABLET ORAL at 06:07

## 2022-06-28 RX ADMIN — Medication 3 MILLIGRAM(S): at 02:59

## 2022-06-28 RX ADMIN — PANTOPRAZOLE SODIUM 40 MILLIGRAM(S): 20 TABLET, DELAYED RELEASE ORAL at 06:02

## 2022-06-28 NOTE — CONSULT NOTE ADULT - ASSESSMENT
63yo male with PMHx congestive heart failure, aortic dissection s/p repair in 2020 with CABG x2, CVA, CKD, GIB, mitral and aortic valve replacement, past history of ESRD (not on HD since October 2021), atrial fibrillation on Eliquis (held since last Tues-Weds 2/2 rectal bleeding), S/p colectomy with ostomy reversal, and recent exlap/RENEA for SBO a few months ago presenting to ED for abnormal blood work showing low H/H. Reporting rectal bleeding that now subsided. GI following for further evaluation. 
A/P: 63 y/o M with a PMHx of psoriasis, former cocaine abuse (quit 2010), Aortic dissection s/p emergent repair (2010), surgery complicated by CVA w/ residual left sided weakness and bowel ischemia s/p bowel resection and ostomy with reversal repair, Covid infection, ESRD on HD (Tu/Thur/Sat) s/p LUE AVF, HTN, seizure disorder, GIB s/p colonoscopy and AVMs cauterization 3/2021 and CAD s/p remote stent to LAD and recent CABG x 2 (SVG-OM, SVG- RPDA with Dr. Butler 11/2020), AI and MR s/p tAVR, tMVR (11/2020 Dr. Butler) with post-op pAfib/flutter (on Eliquis HFrEF (EF 45-50% 02/21) s/p uncomplicated radiofrequency ablation atrial fibrillation,  atrial flutter (WACA/PVI, CTI and mitral flutter) on 9/14/21, and SBO 11/21 s/p lysis of adhesions who presented to the ER due to a low hemoglobin and GI bleed. Patient states that starting 2 weeks ago he began to have BRBPR, and began feeling very lightheaded. Patient was told to hold Eliquis by Romain To NP, and had bloodwork drawn that showed a hemoglobin of 7.5 so he was sent to the ER to be evaluated. Patient is to have an EGD/colonoscopy tomorrow. Patient denies fevers, chills, CP, SOB, syncope, abdominal pain, N/V/D, headache, or vision changes.

## 2022-06-28 NOTE — PROGRESS NOTE ADULT - ASSESSMENT
61yo male with PMHx congestive heart failure, aortic dissection s/p repair in 2020 with CABG x2, CVA, CKD, GIB, mitral and aortic valve replacement, past history of ESRD (not on HD since October 2021), atrial fibrillation on Eliquis (held since last Tues-Weds 2/2 rectal bleeding), S/p colectomy with ostomy reversal, and recent exlap/RENEA for SBO a few months ago presenting to ED for abnormal blood work showing low H/H. Reporting rectal bleeding that now subsided. GI following for further evaluation.

## 2022-06-28 NOTE — PROGRESS NOTE ADULT - ASSESSMENT
61 yo M w/ hx aortic dissection s/p repair (2010), surgery complicated by bowel ischemia s/p bowel resection and ostomy with reversal; bio AVR/ MVR, HFpEF, afib on eliquis, recent dc nov 2021 for SBO s/p lysis of adhesions referred to ER by cardiologist for outpatient labs with anemia. Patient noted rectal bleeding on weekend of june 18/19 which spontaneously stopped. He was evaluated by cardiologist and GI midweek, advised to stop eliquis and check labs.  Labs resulted yesterday and referred to er.  patient notes dizziness but no chest pain/sob. denies urinary discomfort.  denies hematemesis   in Er, cr 2.3 and hg 7.8.  ER to transfuse 1 U PRBC      acute blood loss anemia    1U PRBC, iron panel ok     hold eliquis    PPI BID    trend h/h     GI consulted : plan for egd/colonoscopy weds    cardiology consulted for optimization      ARF: hold torsemide, arb    likely due to anemia    patient declined renal u/s as had one recently     c/w flomax    afib: c/w toprol, hold eliquis.

## 2022-06-28 NOTE — CONSULT NOTE ADULT - PROBLEM SELECTOR RECOMMENDATION 2
- CAD s/p remote stent to LAD and recent CABG x 2 (SVG-OM, SVG- RPDA with Dr. Butler 11/2020).   - Aspirin on hold for GI bleed, restart when cleared by GI.   - Continue metoprolol and lipitor.

## 2022-06-28 NOTE — CONSULT NOTE ADULT - PROBLEM SELECTOR RECOMMENDATION 9
- RCRI 11% risk of major cardiac event.   - Echo 11/21 with EF 45-50%, mildly reduced LVEF, well positioned AVR, MVR, mild TR.   - Patient with multiple cardiac issues and comorbidities that place patient at elevated risk for any procedures.   - No s/s of ACS or decompensated CHF.  - No active chest pain, evidence of ischemia, decompensated CHF, significant valvular abnormality, or unstable arrhythmia and therefore no absolute cardiac contraindication to the planned surgical procedure.   - Would place patient on telemetry.

## 2022-06-28 NOTE — PROGRESS NOTE ADULT - NS ATTEND AMEND GEN_ALL_CORE FT
62 M  Hb 7s on admission, now 8s after 1 U PRBC  several colonoscopies recently, '20 nl, '21 possible AVM cauterized R colon  Plan for EGD/colon tomorrow, prepping tonight  Cards clearance appreciated

## 2022-06-28 NOTE — PROGRESS NOTE ADULT - SUBJECTIVE AND OBJECTIVE BOX
feels well  no further bleeding  ros negative     MEDICATIONS  (STANDING):  amLODIPine   Tablet 5 milliGRAM(s) Oral daily  atorvastatin 40 milliGRAM(s) Oral at bedtime  levETIRAcetam 750 milliGRAM(s) Oral two times a day  metoprolol succinate ER 25 milliGRAM(s) Oral daily  pantoprazole  Injectable 40 milliGRAM(s) IV Push two times a day  tamsulosin 0.4 milliGRAM(s) Oral at bedtime    MEDICATIONS  (PRN):  acetaminophen     Tablet .. 650 milliGRAM(s) Oral every 6 hours PRN Temp greater or equal to 38C (100.4F), Mild Pain (1 - 3), Moderate Pain (4 - 6)  melatonin 3 milliGRAM(s) Oral at bedtime PRN Sleep  traMADol 25 milliGRAM(s) Oral every 4 hours PRN Moderate Pain (4 - 6)      Allergies    penicillin (Other)      Vital Signs Last 24 Hrs  T(C): 36.9 (28 Jun 2022 08:01), Max: 36.9 (28 Jun 2022 05:03)  T(F): 98.4 (28 Jun 2022 08:01), Max: 98.4 (28 Jun 2022 05:03)  HR: 56 (28 Jun 2022 08:01) (52 - 58)  BP: 107/47 (28 Jun 2022 08:01) (106/61 - 128/68)  BP(mean): 87 (27 Jun 2022 15:41) (87 - 87)  RR: 16 (28 Jun 2022 05:03) (16 - 18)  SpO2: 92% (28 Jun 2022 05:03) (92% - 98%)    PHYSICAL EXAM:    GENERAL: NAD  CHEST/LUNG: Clear to ausculation bilaterally  HEART: Regular rate and rhythm; S1 S2;  ABDOMEN: Soft, Nontender,  Bowel sounds present  EXTREMITIES:  no edema   NERVOUS SYSTEM:  Alert & Oriented X3, Motor Strength 5/5 B/L upper and lower extremities  PSYCH: normal mood, appropriate response.    LABS:                        8.5    5.88  )-----------( 185      ( 28 Jun 2022 06:31 )             26.5     06-28    142  |  111<H>  |  28.1<H>  ----------------------------<  87  3.8   |  20.0<L>  |  1.95<H>    Ca    8.4<L>      28 Jun 2022 06:32    TPro  6.7  /  Alb  3.7  /  TBili  0.5  /  DBili  x   /  AST  9   /  ALT  9   /  AlkPhos  99  06-27    PT/INR - ( 27 Jun 2022 11:09 )   PT: 16.3 sec;   INR: 1.40 ratio         PTT - ( 27 Jun 2022 11:09 )  PTT:38.4 sec      CAPILLARY BLOOD GLUCOSE            RADIOLOGY & ADDITIONAL TESTS:

## 2022-06-28 NOTE — PATIENT PROFILE ADULT - FALL HARM RISK - UNIVERSAL INTERVENTIONS
Bed in lowest position, wheels locked, appropriate side rails in place/Call bell, personal items and telephone in reach/Instruct patient to call for assistance before getting out of bed or chair/Non-slip footwear when patient is out of bed/[place] to call system/Physically safe environment - no spills, clutter or unnecessary equipment/Purposeful Proactive Rounding/Room/bathroom lighting operational, light cord in reach

## 2022-06-28 NOTE — CONSULT NOTE ADULT - PROBLEM SELECTOR RECOMMENDATION 3
- Currently in SB.   - Continue Toprol XL.   - Eliquis on hold for GI bleeding, will restart when cleared by GI.   - Outpatient Watchman evaluation with EP.   - Place on telemetry monitoring.

## 2022-06-28 NOTE — PROGRESS NOTE ADULT - SUBJECTIVE AND OBJECTIVE BOX
Chief Complaint: This is a 62y old man patient being seen in follow-up consultation for GIB, anemia.     Internal HPI / 24H events:  Patient seen and evaluated at bedside, reporting no complaints, no overnight events. His hemoglobin remain stable, today 8.5 gm. Denies nausea, vomiting, abdominal pain, chest pain, shortness of breath, lightheadedness, hematemesis, hematochezia, melena. Reports normal BM yesterday.       Review of Systems:  . Constitutional: No fever, chills,   · ENMT: no vertigo, no throat pain  . Neck: No pain or stiffness  · Respiratory and Thorax: No shortness of breath, no cough, no wheezing  · Cardiovascular: No chest pain, palpitation, no dizziness, no orthopnea,   · Gastrointestinal: see above.  · Genitourinary: No hematuria, no dysuria  · Musculoskeletal: Negative  · Neurological: no headache, no vision changes, no slurred speech  · Psychiatric: no agitation, no anxiety  · Hematology/Lymphatics: negative  · Endocrine: negative         PAST MEDICAL/SURGICAL HISTORY:  CHF (congestive heart failure)    CVA (cerebral vascular accident)    Seizures  last seizure 10 years ago    HTN (hypertension)    Hyperlipemia    COVID-19 october 2020    Atrial fibrillation    ESRD on dialysis  Tu/Thurs/Sat    Former smoker    History of cocaine use  remote hx, currently sober    H/O aortic dissection  s/p repair (2010), surgery complicated by bowel ischemia s/p bowel resection and ostomy with reversal    H/O aortic valve insufficiency    Mitral regurgitation    GIB (gastrointestinal bleeding)    CAD (coronary artery disease)  s/p PCI 1998  and CABG x 2 11/2020    H/O colectomy    Status post double vessel coronary artery bypass  11/2020 Dr Butler    S/P AVR (aortic valve replacement)  (t)AVR 11/2020 Dr Butler    S/P MVR (mitral valve replacement)  (t)MVR 11/2020 Dr Butler    H/O aortic dissection  Type A; emergent repair 2010    AV fistula  LUE      MEDICATIONS  (STANDING):  amLODIPine   Tablet 5 milliGRAM(s) Oral daily  atorvastatin 40 milliGRAM(s) Oral at bedtime  levETIRAcetam 750 milliGRAM(s) Oral two times a day  metoprolol succinate ER 25 milliGRAM(s) Oral daily  pantoprazole  Injectable 40 milliGRAM(s) IV Push two times a day  polyethylene glycol/electrolyte Solution. 4000 milliLiter(s) Oral once  tamsulosin 0.4 milliGRAM(s) Oral at bedtime    MEDICATIONS  (PRN):  acetaminophen     Tablet .. 650 milliGRAM(s) Oral every 6 hours PRN Temp greater or equal to 38C (100.4F), Mild Pain (1 - 3), Moderate Pain (4 - 6)  melatonin 3 milliGRAM(s) Oral at bedtime PRN Sleep  traMADol 25 milliGRAM(s) Oral every 4 hours PRN Moderate Pain (4 - 6)    penicillin (Other)    T(C): 36.9 (06-28-22 @ 08:01), Max: 36.9 (06-28-22 @ 05:03)  HR: 56 (06-28-22 @ 08:01) (52 - 58)  BP: 107/47 (06-28-22 @ 08:01) (106/61 - 128/68)  RR: 16 (06-28-22 @ 05:03) (16 - 18)  SpO2: 92% (06-28-22 @ 05:03) (92% - 98%)      PHYSICAL EXAM:    Constitutional: In no acute distress  Neuro: Awake alert, oriented to person, place and situation, non-focal, speech clear and intact  HEENT: PERRL, anicteric sclerae  Neck: supple, no JVD  CV: regular rate, regular rhythm, +S1S2, +systolic murmur  Pulm/chest: lung sounds clear bilaterally, no accessory muscle use noted  Abd: soft, NT, ND, +BS. Mid abd surgical scar noted,  Ext:  no Cyanosis, clubbing or edema  Skin: warm, no jaundice   Psych: calm, cooperative        LABS:               8.5    5.88  )-----------( 185      ( 06-28 @ 06:31 )             26.5                8.3    7.57  )-----------( 181      ( 06-27 @ 20:43 )             26.5                7.8    6.30  )-----------( 187      ( 06-27 @ 11:09 )             24.6       06-28    142  |  111<H>  |  28.1<H>  ----------------------------<  87  3.8   |  20.0<L>  |  1.95<H>    Ca    8.4<L>      28 Jun 2022 06:32    TPro  6.7  /  Alb  3.7  /  TBili  0.5  /  DBili  x   /  AST  9   /  ALT  9   /  AlkPhos  99  06-27    LIVER FUNCTIONS - ( 27 Jun 2022 11:09 )  Alb: 3.7 g/dL / Pro: 6.7 g/dL / ALK PHOS: 99 U/L / ALT: 9 U/L / AST: 9 U/L / GGT: x           PT/INR - ( 27 Jun 2022 11:09 )   PT: 16.3 sec;   INR: 1.40 ratio         PTT - ( 27 Jun 2022 11:09 )  PTT:38.4 sec  Iron - Total Binding Capacity.: 260 ug/dL (06-28-22 @ 06:32)  Iron Total, Serum: 40 ug/dL *L* (06-28-22 @ 06:32)  % Saturation, Iron: 15 % *L* (06-28-22 @ 06:32)  Ferritin, Serum: 275 ng/mL (06-28-22 @ 06:32)       Chief Complaint: This is a 62y old man patient being seen in follow-up consultation for GIB, anemia.     Internal HPI / 24H events:  Patient seen and evaluated at bedside, reporting no complaints, no overnight events. His hemoglobin remains stable, today 8.5 gm. Denies nausea, vomiting, abdominal pain, chest pain, shortness of breath, lightheadedness, hematemesis, hematochezia, melena. Reports normal BM yesterday.       Review of Systems:  . Constitutional: No fever, chills,   · ENMT: no vertigo, no throat pain  . Neck: No pain or stiffness  · Respiratory and Thorax: No shortness of breath, no cough, no wheezing  · Cardiovascular: No chest pain, palpitation, no dizziness, no orthopnea,   · Gastrointestinal: see above.  · Genitourinary: No hematuria, no dysuria  · Musculoskeletal: Negative  · Neurological: no headache, no vision changes, no slurred speech  · Psychiatric: no agitation, no anxiety  · Hematology/Lymphatics: negative  · Endocrine: negative         PAST MEDICAL/SURGICAL HISTORY:  CHF (congestive heart failure)    CVA (cerebral vascular accident)    Seizures  last seizure 10 years ago    HTN (hypertension)    Hyperlipemia    COVID-19 october 2020    Atrial fibrillation    ESRD on dialysis  Tu/Thurs/Sat    Former smoker    History of cocaine use  remote hx, currently sober    H/O aortic dissection  s/p repair (2010), surgery complicated by bowel ischemia s/p bowel resection and ostomy with reversal    H/O aortic valve insufficiency    Mitral regurgitation    GIB (gastrointestinal bleeding)    CAD (coronary artery disease)  s/p PCI 1998  and CABG x 2 11/2020    H/O colectomy    Status post double vessel coronary artery bypass  11/2020 Dr Butler    S/P AVR (aortic valve replacement)  (t)AVR 11/2020 Dr Butler    S/P MVR (mitral valve replacement)  (t)MVR 11/2020 Dr Butler    H/O aortic dissection  Type A; emergent repair 2010    AV fistula  LUE      MEDICATIONS  (STANDING):  amLODIPine   Tablet 5 milliGRAM(s) Oral daily  atorvastatin 40 milliGRAM(s) Oral at bedtime  levETIRAcetam 750 milliGRAM(s) Oral two times a day  metoprolol succinate ER 25 milliGRAM(s) Oral daily  pantoprazole  Injectable 40 milliGRAM(s) IV Push two times a day  polyethylene glycol/electrolyte Solution. 4000 milliLiter(s) Oral once  tamsulosin 0.4 milliGRAM(s) Oral at bedtime    MEDICATIONS  (PRN):  acetaminophen     Tablet .. 650 milliGRAM(s) Oral every 6 hours PRN Temp greater or equal to 38C (100.4F), Mild Pain (1 - 3), Moderate Pain (4 - 6)  melatonin 3 milliGRAM(s) Oral at bedtime PRN Sleep  traMADol 25 milliGRAM(s) Oral every 4 hours PRN Moderate Pain (4 - 6)    penicillin (Other)    T(C): 36.9 (06-28-22 @ 08:01), Max: 36.9 (06-28-22 @ 05:03)  HR: 56 (06-28-22 @ 08:01) (52 - 58)  BP: 107/47 (06-28-22 @ 08:01) (106/61 - 128/68)  RR: 16 (06-28-22 @ 05:03) (16 - 18)  SpO2: 92% (06-28-22 @ 05:03) (92% - 98%)      PHYSICAL EXAM:    Constitutional: In no acute distress  Neuro: Awake alert, oriented to person, place and situation, non-focal, speech clear and intact  HEENT: PERRL, anicteric sclerae  Neck: supple, no JVD  CV: regular rate, regular rhythm, +S1S2, +systolic murmur  Pulm/chest: lung sounds clear bilaterally, no accessory muscle use noted  Abd: soft, NT, ND, +BS. Mid abd surgical scar noted,  Ext:  no Cyanosis, clubbing or edema  Skin: warm, no jaundice   Psych: calm, cooperative        LABS:               8.5    5.88  )-----------( 185      ( 06-28 @ 06:31 )             26.5                8.3    7.57  )-----------( 181      ( 06-27 @ 20:43 )             26.5                7.8    6.30  )-----------( 187      ( 06-27 @ 11:09 )             24.6       06-28    142  |  111<H>  |  28.1<H>  ----------------------------<  87  3.8   |  20.0<L>  |  1.95<H>    Ca    8.4<L>      28 Jun 2022 06:32    TPro  6.7  /  Alb  3.7  /  TBili  0.5  /  DBili  x   /  AST  9   /  ALT  9   /  AlkPhos  99  06-27    LIVER FUNCTIONS - ( 27 Jun 2022 11:09 )  Alb: 3.7 g/dL / Pro: 6.7 g/dL / ALK PHOS: 99 U/L / ALT: 9 U/L / AST: 9 U/L / GGT: x           PT/INR - ( 27 Jun 2022 11:09 )   PT: 16.3 sec;   INR: 1.40 ratio         PTT - ( 27 Jun 2022 11:09 )  PTT:38.4 sec  Iron - Total Binding Capacity.: 260 ug/dL (06-28-22 @ 06:32)  Iron Total, Serum: 40 ug/dL *L* (06-28-22 @ 06:32)  % Saturation, Iron: 15 % *L* (06-28-22 @ 06:32)  Ferritin, Serum: 275 ng/mL (06-28-22 @ 06:32)

## 2022-06-28 NOTE — CONSULT NOTE ADULT - SUBJECTIVE AND OBJECTIVE BOX
Patient is a 62y old  Male who presents with a chief complaint of abnormal lab results    HPI:  61yo male with PMHx congestive heart failure, aortic dissection s/p repair in 2020 with CABG x2, CVA, CKD, GIB, mitral and aortic valve replacement, past history of ESRD (not on HD since October 2021), atrial fibrillation on Eliquis (held since last Tues-Weds 2/2 rectal bleeding), S/p colectomy with ostomy reversal, and recent exlap/RENEA for SBO a few months ago presenting to ED for abnormal blood work showing low H/H. Patient states that he had episode of several bright red stools last week, after being "backed up" over the weekend. Pt stating "I may have irritated something". Reports taking lomotil to stop the BMs. Last episode of bloody BM on Thursday. Now patient reporting nonbloody brown BMs. Patient recently seen by GI as outpatient for GI bleed and was Supposed to have outpatient colonoscopy and endoscopy after he obtains cardiac clearance. Had labs done on Saturday from his cardiologist which showed anemia (unsure level). Patient was also seen by GI Dr. Bruno for hematochezia in March/2021 and underwent Flex sigmoidoscopy revealing ileorectal anastomosis with AVMs around the site. Patient last EGD/ Colonoscopy with Dr. Nieto Nov/2020 revealing hiatal hernia, unremarkable colonoscopy. At this time, patient Denies nausea, vomiting, abdominal pain, chest pain, shortness of breath, hematemesis, hematochezia, melena. Hemoglobin on admission 7.8gm. INR 1.40. Patient refusing rectal exam.       PAST MEDICAL & SURGICAL HISTORY:  CHF (congestive heart failure)      CVA (cerebral vascular accident)      Seizures  last seizure 10 years ago      HTN (hypertension)      Hyperlipemia      COVID-19  october 2020      Atrial fibrillation      ESRD on dialysis  Tu/Thurs/Sat      Former smoker      History of cocaine use  remote hx, currently sober      H/O aortic dissection  s/p repair (2010), surgery complicated by bowel ischemia s/p bowel resection and ostomy with reversal      H/O aortic valve insufficiency      Mitral regurgitation      GIB (gastrointestinal bleeding)      CAD (coronary artery disease)  s/p PCI 1998  and CABG x 2 11/2020      H/O colectomy      Status post double vessel coronary artery bypass  11/2020 Dr Butler      S/P AVR (aortic valve replacement)  (t)AVR 11/2020 Dr Butler      S/P MVR (mitral valve replacement)  (t)MVR 11/2020 Dr Butler      H/O aortic dissection  Type A; emergent repair 2010      AV fistula  LUE          Allergies    penicillin (Other)    Intolerances        MEDICATIONS  (STANDING):    MEDICATIONS  (PRN):      Social History:    Marital Status:  (   )    (   ) Single    (   )    (  )   Occupation:   Lives with: (  ) alone  (  ) children   (  ) spouse   (  ) parents  (  ) other    Substance Use (street drugs): (  ) never used  (  ) other:  Tobacco Usage:  (   ) never smoked   (   ) former smoker   (   ) current smoker  (     ) pack year  (        ) last cigarette date  Alcohol Usage:  Sexual History:     Family History   IBD (  ) Yes   (  ) No  GI Malignancy (  )  Yes    (  ) No    Health Management     Last Colonoscopy - Nov/2020 unremarkable. Flex sig 03/2021- Revealing ileorectal anastomosis with AVMs around the site.       (     ) Advanced Directives: (     ) None    (      ) DNR    (     ) DNI    (     ) Health Care Proxy:     Review of Systems:    General:  No wt loss, fevers, chills, night sweats, fatigue,   CV:  No pain, palpitations, hypo/hypertension  Resp:  No dyspnea, cough, tachypnea, wheezing  GI:  See HPI   :  No pain, bleeding, incontinence, nocturia  Muscle:  No pain, weakness  Neuro:  No weakness, tingling, memory problems  Psych:  No fatigue, insomnia, mood problems, depression  Endocrine:  No polyuria, polydypsia, cold/heat intolerance  Heme:  No petechiae, ecchymosis, easy bruisability  Skin:  No rash, tattoos, scars, edema      Vital Signs Last 24 Hrs  T(C): 36.8 (27 Jun 2022 09:33), Max: 36.8 (27 Jun 2022 09:33)  T(F): 98.2 (27 Jun 2022 09:33), Max: 98.2 (27 Jun 2022 09:33)  HR: 47 (27 Jun 2022 09:33) (47 - 47)  BP: 114/48 (27 Jun 2022 09:33) (114/48 - 114/48)  BP(mean): --  RR: 16 (27 Jun 2022 09:33) (16 - 16)  SpO2: 99% (27 Jun 2022 09:33) (99% - 99%)    PHYSICAL EXAM:    Constitutional: NAD  Neck: No LAD, supple  Respiratory: CTA and P  Cardiovascular: S1 and S2, RRR, no M  Gastrointestinal: Mid abd surgical scar noted, BS+, soft, NT/ND, neg HSM,  Extremities: No peripheral edema, neg clubbing, cyanosis  Vascular: 2+ peripheral pulses  Neurological: A/O x 3, no focal deficits  Psychiatric: Normal mood, normal affect  Skin: No rashes  DIONE: Patient refusing rectal exam      LABS:                        7.8    6.30  )-----------( 187      ( 27 Jun 2022 11:09 )             24.6     06-27    137  |  105  |  29.4<H>  ----------------------------<  101<H>  4.1   |  22.0  |  2.30<H>    Ca    8.8      27 Jun 2022 11:09    TPro  6.7  /  Alb  3.7  /  TBili  0.5  /  DBili  x   /  AST  9   /  ALT  9   /  AlkPhos  99  06-27    PT/INR - ( 27 Jun 2022 11:09 )   PT: 16.3 sec;   INR: 1.40 ratio         PTT - ( 27 Jun 2022 11:09 )  PTT:38.4 sec    LIVER FUNCTIONS - ( 27 Jun 2022 11:09 )  Alb: 3.7 g/dL / Pro: 6.7 g/dL / ALK PHOS: 99 U/L / ALT: 9 U/L / AST: 9 U/L / GGT: x             RADIOLOGY & ADDITIONAL TESTS:  
                                             [facility]                                              CARDIOLOGY AT [address], [place] [address]                                             Telephone: [phone]. Fax:[phone]                                                       CARDIOLOGY CONSULTATION NOTE                                                                                             History obtained by: Patient and medical record  Community Cardiologist: Dr. Frankel   obtained: Yes [  ] No [ x ]  Reason for Consultation: Natali-operative Cardiac Risk Stratification  Available out pt records reviewed: Yes [  ] No [ x ]    Chief complaint:    Patient is a 62y old  Male who presents with a chief complaint of rectal bleeding (27 Jun 2022 15:01)      HPI: 63 y/o M with a PMHx of psoriasis, former cocaine abuse (quit 2010), Aortic dissection s/p emergent repair (2010), surgery complicated by CVA w/ residual left sided weakness and bowel ischemia s/p bowel resection and ostomy with reversal repair, Covid infection, ESRD on HD (Tu/Thur/Sat) s/p LUE AVF, HTN, seizure disorder, GIB s/p colonoscopy and AVMs cauterization 3/2021 and CAD s/p remote stent to LAD and recent CABG x 2 (SVG-OM, SVG- RPDA with Dr. Butler 11/2020), AI and MR s/p tAVR, tMVR (11/2020 Dr. Butler) with post-op pAfib/flutter (on Eliquis HFrEF (EF 45-50% 02/21) s/p uncomplicated radiofrequency ablation atrial fibrillation,  atrial flutter (WACA/PVI, CTI and mitral flutter) on 9/14/21, and SBO 11/21 s/p lysis of adhesions who presented to the ER due to a low hemoglobin and GI bleed. Patient states that starting 2 weeks ago he began to have BRBPR, and began feeling very lightheaded. Patient was told to hold Eliquis by Romain To NP, and had bloodwork drawn that showed a hemoglobin of 7.5 so he was sent to the ER to be evaluated. Patient is to have an EGD/colonoscopy tomorrow. Patient denies fevers, chills, CP, SOB, syncope, abdominal pain, N/V/D, headache, or vision changes.     CARDIAC TESTING   ECHO:  < from: TTE Echo Complete w/ Contrast w/ Doppler (11.01.21 @ 10:54) >  PHYSICIAN INTERPRETATION:  Left Ventricle: Theleft ventricular internal cavity size is normal.  Global LV systolic function was mildly decreased. Left ventricular ejection fraction, by visual estimation, is 45 to 50%. Abnormal (paradoxical) septal motion consistent with post-operative status. The left ventricular diastolic function could not be assessed in this study.  Right Ventricle: The right ventricular size is normal. RV systolic function is mildly reduced.  Left Atrium: Left atrial enlargement.  Right Atrium: Right atrial enlargement.  Pericardium: There is no evidence of pericardial effusion.  Mitral Valve: A bioprosthetic valve is present in the mitral position. Mild to moderate mitral valve regurgitation is seen.  Tricuspid Valve: Mild tricuspid regurgitation is visualized.  Aortic Valve: The aortic valve is a normally functioning bioprosthetic valve. No evidence of aortic valve regurgitation is seen.  Pulmonic Valve: Structurally normal pulmonic valve, with normal leaflet excursion. Trace pulmonic valve regurgitation.  Venous: The inferior vena cava was normal sized, with respiratory size variation greater than 50%.      Summary:   1. Technically adequate study.   2. Mildly decreased global left ventricular systolic function.   3. Left ventricular ejection fraction, by visual estimation, is 45 to 50%.   4. The left ventricular diastolic function could not be assessed in this study.   5. Left atrial enlargement.   6. Mildly reduced RV systolic function.   7. Right atrial enlargement.   8. A bioprosthetic valve is present in the mitral position.   9. Aortic valve is normally functioning bioprosthetic valve.  10. Aortic root is not well visualized.  11. Mild to moderate mitral valve regurgitation.  12. Mild tricuspid regurgitation.  13. There is no evidence of pericardial effusion.    MD Gennaro Electronically signed on 11/1/2021 at 1:23:45 PM    < end of copied text >    STRESS:    CATH:     ELECTROPHYSIOLOGY:     PAST MEDICAL HISTORY  CHF (congestive heart failure)    CVA (cerebral vascular accident)    Chronic kidney disease    Seizures    HTN (hypertension)    Hyperlipemia    COVID-19    Atrial fibrillation    ESRD on dialysis    Former smoker    History of cocaine use    H/O aortic dissection    H/O aortic valve insufficiency    Mitral regurgitation    GIB (gastrointestinal bleeding)    CAD (coronary artery disease)        PAST SURGICAL HISTORY  Aorta disorder    H/O colectomy    Status post double vessel coronary artery bypass    S/P AVR (aortic valve replacement)    S/P MVR (mitral valve replacement)    H/O aortic dissection    AV fistula      SOCIAL HISTORY:    CIGARETTES: Former smoker     ALCOHOL: Denies  DRUGS: Former cocaine use, stopped in 2010.       FAMILY HISTORY:  FH: hypertension    Family history of cardiac disorder (Mother)  mother      Family History of Cardiovascular Disease:  Yes [  ] No [  ]  Coronary Artery Disease in first degree relative: Yes [  ] No [  ]  Sudden Cardiac Death in First degree relative: Yes [  ] No [  ]    HOME MEDICATIONS:  acetaminophen 325 mg oral tablet: 2 tab(s) orally every 6 hours, As needed, Mild Pain (1 - 3) (08 Nov 2021 12:02)  amLODIPine 5 mg oral tablet: 1 tab(s) orally once a day (01 Nov 2021 12:22)  atorvastatin 40 mg oral tablet: 1 tab(s) orally once a day (at bedtime) (01 Nov 2021 12:22)  Eliquis 5 mg oral tablet: 1  orally once a day (01 Nov 2021 12:22)  levETIRAcetam 750 mg oral tablet: 1 tab(s) orally once a day (01 Nov 2021 12:22)  pantoprazole 40 mg oral delayed release tablet: 1 tab(s) orally once a day (before a meal) (08 Nov 2021 12:02)  sevelamer hydrochloride 800 mg oral tablet: 1 tab(s) orally 2 times a day (with meals) (01 Nov 2021 12:22)  tamsulosin 0.4 mg oral capsule: 1 cap(s) orally once a day (at bedtime) (08 Nov 2021 12:02)  torsemide 20 mg oral tablet: 1 tab(s) orally once a day (01 Nov 2021 12:22)  traMADol 50 mg oral tablet: 0.5 tab(s) orally every 4 hours, As needed, Moderate Pain (4 - 6) (08 Nov 2021 14:55)      CURRENT CARDIAC MEDICATIONS:  amLODIPine   Tablet 5 milliGRAM(s) Oral daily  metoprolol succinate ER 25 milliGRAM(s) Oral daily  tamsulosin 0.4 milliGRAM(s) Oral at bedtime      CURRENT OTHER MEDICATIONS:  acetaminophen     Tablet .. 650 milliGRAM(s) Oral every 6 hours PRN Temp greater or equal to 38C (100.4F), Mild Pain (1 - 3), Moderate Pain (4 - 6)  levETIRAcetam 750 milliGRAM(s) Oral two times a day  melatonin 3 milliGRAM(s) Oral at bedtime PRN Sleep  traMADol 25 milliGRAM(s) Oral every 4 hours PRN Moderate Pain (4 - 6)  pantoprazole  Injectable 40 milliGRAM(s) IV Push two times a day  atorvastatin 40 milliGRAM(s) Oral at bedtime      ALLERGIES:   penicillin (Other)      REVIEW OF SYMPTOMS:   CONSTITUTIONAL: No fever, no chills, no weight loss, no weight gain, no fatigue   ENMT:  No vertigo; No sinus or throat pain  NECK: No pain or stiffness  CARDIOVASCULAR: AS PER HPI  RESPIRATORY: no Shortness of breath, no cough, no wheezing  : No dysuria, no hematuria   GI: AS PER HPI  NEURO: No headache, no slurred speech   MUSCULOSKELETAL: No joint pain or swelling; No muscle, back, or extremity pain  PSYCH: No agitation, no anxiety.    ALL OTHER REVIEW OF SYSTEMS ARE NEGATIVE.    VITAL SIGNS:  T(C): 36.9 (06-28-22 @ 08:01), Max: 36.9 (06-28-22 @ 05:03)  T(F): 98.4 (06-28-22 @ 08:01), Max: 98.4 (06-28-22 @ 05:03)  HR: 56 (06-28-22 @ 08:01) (52 - 58)  BP: 107/47 (06-28-22 @ 08:01) (106/61 - 128/68)  RR: 16 (06-28-22 @ 05:03) (16 - 18)  SpO2: 92% (06-28-22 @ 05:03) (92% - 98%)    INTAKE AND OUTPUT:       PHYSICAL EXAM:  Constitutional: Comfortable . No acute distress.   HEENT: Atraumatic and normocephalic , neck is supple . no JVD. No carotid bruit.  CNS: A&Ox3. No focal deficits.   Respiratory: CTAB, unlabored   Cardiovascular: RRR normal s1 s2. No murmur. No rubs or gallop.  Gastrointestinal: Soft, non-tender. +Bowel sounds.   Extremities: 2+ Peripheral Pulses, No clubbing, cyanosis, or edema  Psychiatric: Calm . no agitation.   Skin: Warm and dry, no ulcers on extremities     LABS:                            8.5    5.88  )-----------( 185      ( 28 Jun 2022 06:31 )             26.5     06-28    142  |  111<H>  |  28.1<H>  ----------------------------<  87  3.8   |  20.0<L>  |  1.95<H>    Ca    8.4<L>      28 Jun 2022 06:32    TPro  6.7  /  Alb  3.7  /  TBili  0.5  /  DBili  x   /  AST  9   /  ALT  9   /  AlkPhos  99  06-27    PT/INR - ( 27 Jun 2022 11:09 )   PT: 16.3 sec;   INR: 1.40 ratio         PTT - ( 27 Jun 2022 11:09 )  PTT:38.4 sec        INTERPRETATION OF TELEMETRY: Not on telemetry     ECG: SB, PACs, LVH, NSST/T wave abnormalities   Prior ECG: Yes [ x ] No [  ]    RADIOLOGY & ADDITIONAL STUDIES:    X-ray:    CT scan:     MRI:   US:

## 2022-06-28 NOTE — CONSULT NOTE ADULT - NS ATTEND AMEND GEN_ALL_CORE FT
63yo male with PMHx congestive heart failure, aortic dissection s/p repair in 2020 with CABG x2, CVA, CKD, GIB, mitral and aortic valve replacement, past history of ESRD (not on HD since October 2021), atrial fibrillation on Eliquis (held since last Tues-Weds 2/2 rectal bleeding), S/p colectomy with ostomy reversal, and recent exlap/RENEA for SBO a few months ago presenting to ED for abnormal blood work with anemia./   Reports no rectal bleeding since Wednesday, refused DIONE. Has been off eliquis since one week. Will plan c scope Wednesday, clears today, npo after midnight tomorrow, prep tomorrow. Will need cardiology clearance.    labs /notes/imaging reviewed, pan discussed with np
63 y/o male with extensive PMHx presenting with lower GI bleeding and anemia   Cardiology consulted for perioperative cardiovascular risk evaluation   no active cardiac conditions  patient is at elevated risk for perioperative cardiac events ( RCRI 11% ) but is going for low risk endoscopic procedures

## 2022-06-29 ENCOUNTER — TRANSCRIPTION ENCOUNTER (OUTPATIENT)
Age: 62
End: 2022-06-29

## 2022-06-29 LAB
ANION GAP SERPL CALC-SCNC: 16 MMOL/L — SIGNIFICANT CHANGE UP (ref 5–17)
BUN SERPL-MCNC: 18.9 MG/DL — SIGNIFICANT CHANGE UP (ref 8–20)
CALCIUM SERPL-MCNC: 9.5 MG/DL — SIGNIFICANT CHANGE UP (ref 8.6–10.2)
CHLORIDE SERPL-SCNC: 104 MMOL/L — SIGNIFICANT CHANGE UP (ref 98–107)
CO2 SERPL-SCNC: 20 MMOL/L — LOW (ref 22–29)
CREAT SERPL-MCNC: 1.57 MG/DL — HIGH (ref 0.5–1.3)
EGFR: 50 ML/MIN/1.73M2 — LOW
GLUCOSE SERPL-MCNC: 99 MG/DL — SIGNIFICANT CHANGE UP (ref 70–99)
HCT VFR BLD CALC: 31.2 % — LOW (ref 39–50)
HGB BLD-MCNC: 10.2 G/DL — LOW (ref 13–17)
INR BLD: 1.36 RATIO — HIGH (ref 0.88–1.16)
MAGNESIUM SERPL-MCNC: 2 MG/DL — SIGNIFICANT CHANGE UP (ref 1.8–2.6)
MCHC RBC-ENTMCNC: 29.7 PG — SIGNIFICANT CHANGE UP (ref 27–34)
MCHC RBC-ENTMCNC: 32.7 GM/DL — SIGNIFICANT CHANGE UP (ref 32–36)
MCV RBC AUTO: 90.7 FL — SIGNIFICANT CHANGE UP (ref 80–100)
PLATELET # BLD AUTO: 236 K/UL — SIGNIFICANT CHANGE UP (ref 150–400)
POTASSIUM SERPL-MCNC: 4 MMOL/L — SIGNIFICANT CHANGE UP (ref 3.5–5.3)
POTASSIUM SERPL-SCNC: 4 MMOL/L — SIGNIFICANT CHANGE UP (ref 3.5–5.3)
PROTHROM AB SERPL-ACNC: 15.8 SEC — HIGH (ref 10.5–13.4)
RBC # BLD: 3.44 M/UL — LOW (ref 4.2–5.8)
RBC # FLD: 14 % — SIGNIFICANT CHANGE UP (ref 10.3–14.5)
SODIUM SERPL-SCNC: 140 MMOL/L — SIGNIFICANT CHANGE UP (ref 135–145)
WBC # BLD: 11.43 K/UL — HIGH (ref 3.8–10.5)
WBC # FLD AUTO: 11.43 K/UL — HIGH (ref 3.8–10.5)

## 2022-06-29 PROCEDURE — 45378 DIAGNOSTIC COLONOSCOPY: CPT | Mod: 53

## 2022-06-29 PROCEDURE — 99232 SBSQ HOSP IP/OBS MODERATE 35: CPT

## 2022-06-29 RX ADMIN — AMLODIPINE BESYLATE 5 MILLIGRAM(S): 2.5 TABLET ORAL at 05:13

## 2022-06-29 RX ADMIN — ATORVASTATIN CALCIUM 40 MILLIGRAM(S): 80 TABLET, FILM COATED ORAL at 21:39

## 2022-06-29 RX ADMIN — PANTOPRAZOLE SODIUM 40 MILLIGRAM(S): 20 TABLET, DELAYED RELEASE ORAL at 18:28

## 2022-06-29 RX ADMIN — LEVETIRACETAM 750 MILLIGRAM(S): 250 TABLET, FILM COATED ORAL at 05:13

## 2022-06-29 RX ADMIN — TAMSULOSIN HYDROCHLORIDE 0.4 MILLIGRAM(S): 0.4 CAPSULE ORAL at 21:39

## 2022-06-29 RX ADMIN — LEVETIRACETAM 750 MILLIGRAM(S): 250 TABLET, FILM COATED ORAL at 18:29

## 2022-06-29 RX ADMIN — Medication 25 MILLIGRAM(S): at 05:13

## 2022-06-29 RX ADMIN — PANTOPRAZOLE SODIUM 40 MILLIGRAM(S): 20 TABLET, DELAYED RELEASE ORAL at 05:13

## 2022-06-29 NOTE — PROGRESS NOTE ADULT - SUBJECTIVE AND OBJECTIVE BOX
JONATHAN GIBSON    [id number]    62y      Male    CC: rectal bleeding     INTERVAL HPI/OVERNIGHT EVENTS: pt seen and examined. no acute events repoted o/n     REVIEW OF SYSTEMS:    CONSTITUTIONAL: No fever, weight loss, or fatigue  RESPIRATORY: No cough, wheezing, hemoptysis; No shortness of breath  CARDIOVASCULAR: No chest pain, palpitations  GASTROINTESTINAL: No abdominal or epigastric pain. No nausea, vomiting  NEUROLOGICAL: No headaches    Vital Signs Last 24 Hrs  T(C): 36.9 (29 Jun 2022 16:50), Max: 36.9 (29 Jun 2022 16:50)  T(F): 98.4 (29 Jun 2022 16:50), Max: 98.4 (29 Jun 2022 16:50)  HR: 60 (29 Jun 2022 16:50) (56 - 62)  BP: 124/69 (29 Jun 2022 16:50) (124/69 - 156/69)  BP(mean): --  RR: 18 (29 Jun 2022 16:50) (18 - 18)  SpO2: 96% (29 Jun 2022 16:50) (95% - 99%)    PHYSICAL EXAM:    GENERAL: NAD  HEENT: PERRL, +EOMI  NECK: soft, supple  CHEST/LUNG: Clear to auscultation bilaterally  HEART: S1S2+, Regular rate and rhythm  ABDOMEN: Soft, Nontender, Nondistended; Bowel sounds present  SKIN: warm, dry  NEURO: AAOX3, grossly non-focal  PSYCH: normal affect    LABS:                        10.2   11.43 )-----------( 236      ( 29 Jun 2022 06:01 )             31.2     06-29    140  |  104  |  18.9  ----------------------------<  99  4.0   |  20.0<L>  |  1.57<H>    Ca    9.5      29 Jun 2022 06:01  Mg     2.0     06-29      PT/INR - ( 29 Jun 2022 06:01 )   PT: 15.8 sec;   INR: 1.36 ratio                 MEDICATIONS  (STANDING):  amLODIPine   Tablet 5 milliGRAM(s) Oral daily  atorvastatin 40 milliGRAM(s) Oral at bedtime  levETIRAcetam 750 milliGRAM(s) Oral two times a day  metoprolol succinate ER 25 milliGRAM(s) Oral daily  pantoprazole  Injectable 40 milliGRAM(s) IV Push two times a day  tamsulosin 0.4 milliGRAM(s) Oral at bedtime    MEDICATIONS  (PRN):  acetaminophen     Tablet .. 650 milliGRAM(s) Oral every 6 hours PRN Temp greater or equal to 38C (100.4F), Mild Pain (1 - 3), Moderate Pain (4 - 6)  melatonin 3 milliGRAM(s) Oral at bedtime PRN Sleep  traMADol 25 milliGRAM(s) Oral every 4 hours PRN Moderate Pain (4 - 6)      RADIOLOGY & ADDITIONAL TESTS:

## 2022-06-29 NOTE — PROGRESS NOTE ADULT - ASSESSMENT
62y/oM PMH aortic dissection s/p repair (2010), surgery complicated by bowel ischemia s/p bowel resection and ostomy with reversal; bio AVR/MVR, HFpEF, afib on Eliquis, recent dc Nov 2021 for SBO s/p lysis of adhesions referred to ER by cardiologist for oupt labs with anemia. Pt noted rectal bleeding on weekend June 18/19, which stopped. Evaluated by cardio and GI, advised to stop Eliquis and check labs, admitted for acute anemia     Acute blood loss anemia   -s/p 1u PRBC   -holding eliquis   -ppi  -s/p colonoscopy 6/29; report reviewed   -cardio recs appreciated     SANTOS  -holding torsemide, arb  -likely due to anemia   -pt declined renal sono, had one recently   -cont flomax     chronic afib   -cont toprol  -resume eliquis in am

## 2022-06-30 ENCOUNTER — TRANSCRIPTION ENCOUNTER (OUTPATIENT)
Age: 62
End: 2022-06-30

## 2022-06-30 VITALS
OXYGEN SATURATION: 96 % | SYSTOLIC BLOOD PRESSURE: 122 MMHG | TEMPERATURE: 98 F | RESPIRATION RATE: 18 BRPM | DIASTOLIC BLOOD PRESSURE: 70 MMHG | HEART RATE: 60 BPM

## 2022-06-30 LAB
ANION GAP SERPL CALC-SCNC: 12 MMOL/L — SIGNIFICANT CHANGE UP (ref 5–17)
BUN SERPL-MCNC: 17.6 MG/DL — SIGNIFICANT CHANGE UP (ref 8–20)
CALCIUM SERPL-MCNC: 9 MG/DL — SIGNIFICANT CHANGE UP (ref 8.6–10.2)
CHLORIDE SERPL-SCNC: 108 MMOL/L — HIGH (ref 98–107)
CO2 SERPL-SCNC: 22 MMOL/L — SIGNIFICANT CHANGE UP (ref 22–29)
CREAT SERPL-MCNC: 1.94 MG/DL — HIGH (ref 0.5–1.3)
EGFR: 39 ML/MIN/1.73M2 — LOW
GLUCOSE SERPL-MCNC: 109 MG/DL — HIGH (ref 70–99)
HCT VFR BLD CALC: 29.7 % — LOW (ref 39–50)
HGB BLD-MCNC: 9.6 G/DL — LOW (ref 13–17)
MAGNESIUM SERPL-MCNC: 1.9 MG/DL — SIGNIFICANT CHANGE UP (ref 1.6–2.6)
MCHC RBC-ENTMCNC: 30 PG — SIGNIFICANT CHANGE UP (ref 27–34)
MCHC RBC-ENTMCNC: 32.3 GM/DL — SIGNIFICANT CHANGE UP (ref 32–36)
MCV RBC AUTO: 92.8 FL — SIGNIFICANT CHANGE UP (ref 80–100)
PLATELET # BLD AUTO: 216 K/UL — SIGNIFICANT CHANGE UP (ref 150–400)
POTASSIUM SERPL-MCNC: 3.9 MMOL/L — SIGNIFICANT CHANGE UP (ref 3.5–5.3)
POTASSIUM SERPL-SCNC: 3.9 MMOL/L — SIGNIFICANT CHANGE UP (ref 3.5–5.3)
RBC # BLD: 3.2 M/UL — LOW (ref 4.2–5.8)
RBC # FLD: 14.2 % — SIGNIFICANT CHANGE UP (ref 10.3–14.5)
SODIUM SERPL-SCNC: 142 MMOL/L — SIGNIFICANT CHANGE UP (ref 135–145)
WBC # BLD: 8.33 K/UL — SIGNIFICANT CHANGE UP (ref 3.8–10.5)
WBC # FLD AUTO: 8.33 K/UL — SIGNIFICANT CHANGE UP (ref 3.8–10.5)

## 2022-06-30 PROCEDURE — 93005 ELECTROCARDIOGRAM TRACING: CPT

## 2022-06-30 PROCEDURE — 83735 ASSAY OF MAGNESIUM: CPT

## 2022-06-30 PROCEDURE — 36430 TRANSFUSION BLD/BLD COMPNT: CPT

## 2022-06-30 PROCEDURE — 85025 COMPLETE CBC W/AUTO DIFF WBC: CPT

## 2022-06-30 PROCEDURE — 0225U NFCT DS DNA&RNA 21 SARSCOV2: CPT

## 2022-06-30 PROCEDURE — 99239 HOSP IP/OBS DSCHRG MGMT >30: CPT

## 2022-06-30 PROCEDURE — 86900 BLOOD TYPING SEROLOGIC ABO: CPT

## 2022-06-30 PROCEDURE — 80053 COMPREHEN METABOLIC PANEL: CPT

## 2022-06-30 PROCEDURE — 85027 COMPLETE CBC AUTOMATED: CPT

## 2022-06-30 PROCEDURE — 99285 EMERGENCY DEPT VISIT HI MDM: CPT

## 2022-06-30 PROCEDURE — 86850 RBC ANTIBODY SCREEN: CPT

## 2022-06-30 PROCEDURE — 80048 BASIC METABOLIC PNL TOTAL CA: CPT

## 2022-06-30 PROCEDURE — P9016: CPT

## 2022-06-30 PROCEDURE — 82728 ASSAY OF FERRITIN: CPT

## 2022-06-30 PROCEDURE — 85610 PROTHROMBIN TIME: CPT

## 2022-06-30 PROCEDURE — 85730 THROMBOPLASTIN TIME PARTIAL: CPT

## 2022-06-30 PROCEDURE — 36415 COLL VENOUS BLD VENIPUNCTURE: CPT

## 2022-06-30 PROCEDURE — 83540 ASSAY OF IRON: CPT

## 2022-06-30 PROCEDURE — 82272 OCCULT BLD FECES 1-3 TESTS: CPT

## 2022-06-30 PROCEDURE — 86922 COMPATIBILITY TEST ANTIGLOB: CPT

## 2022-06-30 PROCEDURE — 99233 SBSQ HOSP IP/OBS HIGH 50: CPT | Mod: FS

## 2022-06-30 PROCEDURE — 99232 SBSQ HOSP IP/OBS MODERATE 35: CPT | Mod: FS

## 2022-06-30 PROCEDURE — 83550 IRON BINDING TEST: CPT

## 2022-06-30 PROCEDURE — 84466 ASSAY OF TRANSFERRIN: CPT

## 2022-06-30 PROCEDURE — 86901 BLOOD TYPING SEROLOGIC RH(D): CPT

## 2022-06-30 RX ORDER — HYDROCORTISONE 1 %
1 OINTMENT (GRAM) TOPICAL
Qty: 30 | Refills: 0
Start: 2022-06-30 | End: 2022-07-29

## 2022-06-30 RX ADMIN — AMLODIPINE BESYLATE 5 MILLIGRAM(S): 2.5 TABLET ORAL at 06:27

## 2022-06-30 RX ADMIN — LEVETIRACETAM 750 MILLIGRAM(S): 250 TABLET, FILM COATED ORAL at 06:27

## 2022-06-30 RX ADMIN — PANTOPRAZOLE SODIUM 40 MILLIGRAM(S): 20 TABLET, DELAYED RELEASE ORAL at 06:26

## 2022-06-30 RX ADMIN — Medication 25 MILLIGRAM(S): at 06:27

## 2022-06-30 NOTE — DISCHARGE NOTE NURSING/CASE MANAGEMENT/SOCIAL WORK - NSDCVIVACCINE_GEN_ALL_CORE_FT
SEVERITY:- NORMAL ECG -

SINUS RHYTHM

:

Confirmed by: Vee Alejandra MD 08-Mar-2019 20:54:17 influenza, injectable, quadrivalent, preservative free; 12-Dec-2020 09:28; Lynn Gerardo); [place]; 724k2 (Exp. Date: 30-Jun-2021); IntraMuscular; Deltoid Right.; 0.5 milliLiter(s); VIS (VIS Published: 15-Aug-2019, VIS Presented: 12-Dec-2020);

## 2022-06-30 NOTE — DISCHARGE NOTE PROVIDER - NSDCFUSCHEDAPPT_GEN_ALL_CORE_FT
[facility]  Cardio Electro [address]  Scheduled Appointment: 07/06/2022    Malia Norman  [facility]  FAMILYMED [address]  Scheduled Appointment: 07/20/2022    Felice Frankel  [facility]  CARDIOLOGY [address]  Scheduled Appointment: 08/02/2022    Dago Reyes  [facility]  Nephro [address]  Scheduled Appointment: 08/12/2022    Felice Frankel  [facility]  Cardio [address]  Scheduled Appointment: 08/18/2022    Manvar-Singh, Pallavi  [facility]  VASCULAR [address]  Scheduled Appointment: 09/16/2022

## 2022-06-30 NOTE — PROGRESS NOTE ADULT - SUBJECTIVE AND OBJECTIVE BOX
Patient is a 62y old  Male who presents with a chief complaint of rectal bleeding (30 Jun 2022 09:20)      INTERVAL HPI/OVERNIGHT EVENTS: Patient seen and evaluated at bedside, reporting no complaints, no overnight events, tolerating oral intake, s/p colonoscopy, visualizing subtotal colectomy anastomosis, and internal hemorrhoids. Denies nausea, vomiting, abdominal pain, chest pain, shortness of breath, hematemesis, hematochezia, melena.      MEDICATIONS  (STANDING):  amLODIPine   Tablet 5 milliGRAM(s) Oral daily  atorvastatin 40 milliGRAM(s) Oral at bedtime  levETIRAcetam 750 milliGRAM(s) Oral two times a day  metoprolol succinate ER 25 milliGRAM(s) Oral daily  pantoprazole  Injectable 40 milliGRAM(s) IV Push two times a day  tamsulosin 0.4 milliGRAM(s) Oral at bedtime    MEDICATIONS  (PRN):  acetaminophen     Tablet .. 650 milliGRAM(s) Oral every 6 hours PRN Temp greater or equal to 38C (100.4F), Mild Pain (1 - 3), Moderate Pain (4 - 6)  melatonin 3 milliGRAM(s) Oral at bedtime PRN Sleep  traMADol 25 milliGRAM(s) Oral every 4 hours PRN Moderate Pain (4 - 6)      Allergies    penicillin (Other)    Intolerances    Review of Systems:  · ENMT: negative  · Respiratory and Thorax: negative  · Cardiovascular: negative  · Gastrointestinal: see above.  · Genitourinary:	negative  · Musculoskeletal: negative  · Neurological: negative  · Psychiatric: negative  · Hematology/Lymphatics: negative  · Endocrine: negative        Vital Signs Last 24 Hrs  T(C): 36.9 (30 Jun 2022 05:15), Max: 36.9 (29 Jun 2022 16:50)  T(F): 98.4 (30 Jun 2022 05:15), Max: 98.4 (29 Jun 2022 16:50)  HR: 80 (30 Jun 2022 05:15) (60 - 80)  BP: 130/66 (30 Jun 2022 05:15) (124/69 - 135/72)  BP(mean): --  RR: 18 (30 Jun 2022 05:15) (18 - 18)  SpO2: 99% (30 Jun 2022 05:15) (95% - 99%)    PHYSICAL EXAM:  · Constitutional: Found sitting comfortably,  in no acute distress.   · Eyes: EOMI; PERRL; no drainage or redness  · ENMT: No oral lesions; no gross abnormalities  · Neck:	No bruits; no thyromegaly or nodules  · Back:	No deformity or limitation of movement  · Respiratory: Breath Sounds equal & clear to percussion & auscultation, no accessory muscle use  · Cardiovascular: Regular rate & rhythm, normal S1, S2; no murmurs, gallops or rubs; no S3, S4  · Gastrointestinal: Soft, non-tender, no hepatosplenomegaly, normal bowel sounds      LABS:                        9.6    8.33  )-----------( 216      ( 30 Jun 2022 07:03 )             29.7     06-30    142  |  108<H>  |  17.6  ----------------------------<  109<H>  3.9   |  22.0  |  1.94<H>    Ca    9.0      30 Jun 2022 07:03  Mg     1.9     06-30      PT/INR - ( 29 Jun 2022 06:01 )   PT: 15.8 sec;   INR: 1.36 ratio             LIVER FUNCTIONS - ( 27 Jun 2022 11:09 )  Alb: 3.7 g/dL / Pro: 6.7 g/dL / ALK PHOS: 99 U/L / ALT: 9 U/L / AST: 9 U/L / GGT: x

## 2022-06-30 NOTE — PROGRESS NOTE ADULT - ASSESSMENT
A/P: 61 y/o M with a PMHx of psoriasis, former cocaine abuse (quit 2010), Aortic dissection s/p emergent repair (2010), surgery complicated by CVA w/ residual left sided weakness and bowel ischemia s/p bowel resection and ostomy with reversal repair, Covid infection, ESRD on HD (Tu/Thur/Sat) s/p LUE AVF, HTN, seizure disorder, GIB s/p colonoscopy and AVMs cauterization 3/2021 and CAD s/p remote stent to LAD and recent CABG x 2 (SVG-OM, SVG- RPDA with Dr. Butler 11/2020), AI and MR s/p tAVR, tMVR (11/2020 Dr. Butler) with post-op pAfib/flutter (on Eliquis HFrEF (EF 45-50% 02/21) s/p uncomplicated radiofrequency ablation atrial fibrillation,  atrial flutter (WACA/PVI, CTI and mitral flutter) on 9/14/21, and SBO 11/21 s/p lysis of adhesions who presented to the ER due to a low hemoglobin and GI bleed. Patient states that starting 2 weeks ago he began to have BRBPR, and began feeling very lightheaded. Patient was told to hold Eliquis by Romain To NP, and had bloodwork drawn that showed a hemoglobin of 7.5 so he was sent to the ER to be evaluated.     Patient is now s/p  EGD/colonoscopy from yesterday-Hgb 9.6 today. Stable from Cardiac Standpoint. Patient denies fevers, chills, CP, SOB, syncope, abdominal pain, N/V/D, headache, or vision changes.    Plan for discharge via Hospitalist Team.       Problem/Recommendation - 1:  ·  Problem: CAD (coronary artery disease).   ·  Recommendation: - CAD s/p remote stent to LAD and recent CABG x 2 (SVG-OM, SVG- RPDA with Dr. Butler 11/2020).   - Aspirin was on  hold for GI bleed, should restart when cleared by GI.   - Continue metoprolol and lipitor.  - f/u as outpt with Dr Frankel ( pt states he has an appt for next week)  - Echo 11/21 with EF 45-50%, mildly reduced LVEF, well positioned AVR, MVR, mild TR.   - Patient with multiple cardiac issues and comorbidities that place patient at elevated risk for any procedures.   - No s/s of ACS or decompensated CHF.  - No active chest pain, evidence of ischemia, decompensated CHF, significant valvular abnormality, or unstable arrhythmia.     Problem/Recommendation - 2:  ·  Problem: Atrial fibrillation.   ·  Recommendation: - Currently in SB.   - Continue Toprol XL.   - Eliquis was on hold for GI bleeding, will restart today.  - Outpatient Watchman evaluation with EP.              A/P: 61 y/o M with a PMHx of psoriasis, former cocaine abuse (quit 2010), Aortic dissection s/p emergent repair (2010), surgery complicated by CVA w/ residual left sided weakness and bowel ischemia s/p bowel resection and ostomy with reversal repair, Covid infection, ESRD on HD (Tu/Thur/Sat) s/p LUE AVF, HTN, seizure disorder, GIB s/p colonoscopy and AVMs cauterization 3/2021 and CAD s/p remote stent to LAD and recent CABG x 2 (SVG-OM, SVG- RPDA with Dr. Butler 11/2020), AI and MR s/p tAVR, tMVR (11/2020 Dr. Butler) with post-op pAfib/flutter (on Eliquis HFrEF (EF 45-50% 02/21) s/p uncomplicated radiofrequency ablation atrial fibrillation,  atrial flutter (WACA/PVI, CTI and mitral flutter) on 9/14/21, and SBO 11/21 s/p lysis of adhesions who presented to the ER due to a low hemoglobin and GI bleed. Patient states that starting 2 weeks ago he began to have BRBPR, and began feeling very lightheaded. Patient was told to hold Eliquis by Romain To NP, and had bloodwork drawn that showed a hemoglobin of 7.5 so he was sent to the ER to be evaluated.     Patient is now s/p  EGD/colonoscopy from yesterday-Hgb 9.6 today. Stable from Cardiac Standpoint. Patient denies fevers, chills, CP, SOB, syncope, abdominal pain, N/V/D, headache, or vision changes.    Plan for discharge via Hospitalist Team.       Problem/Recommendation - 1:  ·  Problem: CAD (coronary artery disease).   ·  Recommendation: - CAD s/p remote stent to LAD and recent CABG x 2 (SVG-OM, SVG- RPDA with Dr. Butler 11/2020).   - Baby Aspirin was on  hold for GI bleed, should restart when cleared by GI.   - Continue metoprolol and lipitor.  - f/u as outpt with Dr Frankel ( pt states he has an appt for next week)  - Echo 11/21 with EF 45-50%, mildly reduced LVEF, well positioned AVR, MVR, mild TR.   - Patient with multiple cardiac issues and comorbidities that place patient at elevated risk for any procedures.   - No s/s of ACS or decompensated CHF.  - No active chest pain, evidence of ischemia, decompensated CHF, significant valvular abnormality, or unstable arrhythmia.     Problem/Recommendation - 2:  ·  Problem: Atrial fibrillation.   ·  Recommendation: - Currently in SB.   - Continue Toprol XL.   - Eliquis was on hold for GI bleeding, will restart today.  - Outpatient Watchman evaluation with EP.

## 2022-06-30 NOTE — PROGRESS NOTE ADULT - SUBJECTIVE AND OBJECTIVE BOX
[facility]                                                               Office: [address] [address]                                                              Telephone: [phone]. Fax:[phone]                                                                             PROGRESS NOTE  Reason for follow up: CAD / AF  Overnight: No new events.   Update: Hgb 9.6 Plan for discharge today-eliquis to be restarted.    Subjective: "  _______I want to go home _______________"      General: No fatigue, no fevers/chills  Respiratory: No dyspnea, no cough, no wheeze  CV: No chest pain, no palpitations  Abd: No nausea  Neuro: No headache, no dizziness  	  Vitals:  T(C): 36.9 (06-30-22 @ 05:15), Max: 36.9 (06-29-22 @ 16:50)  HR: 80 (06-30-22 @ 05:15) (60 - 80)  BP: 130/66 (06-30-22 @ 05:15) (124/69 - 135/72)  RR: 18 (06-30-22 @ 05:15) (18 - 18)  SpO2: 99% (06-30-22 @ 05:15) (95% - 99%)  Wt(kg): --  I&O's Summary    Weight (kg): 90 (06-28 @ 21:46)      PHYSICAL EXAM:  Appearance: Comfortable. No acute distress  HEENT:  Head and neck: Atraumatic. Normocephalic.  Normal oral mucosa, PERRL, Neck is supple. No JVD, No carotid bruit.   Neurologic: A & O x 3, no focal deficits. EOMI.  Lymphatic: No cervical lymphadenopathy  Cardiovascular: Normal S1 S2, No murmur, rubs/gallops. No JVD, No edema  Respiratory: Lungs clear to auscultation  Gastrointestinal:  Soft, Non-tender, + BS  Lower Extremities: No edema  Psychiatry: Patient is calm. No agitation. Mood & affect appropriate  Skin: No rashes/ ecchymoses/cyanosis/ulcers visualized on the face, hands or feet.      CURRENT MEDICATIONS:  amLODIPine   Tablet 5 milliGRAM(s) Oral daily  metoprolol succinate ER 25 milliGRAM(s) Oral daily  tamsulosin 0.4 milliGRAM(s) Oral at bedtime    levETIRAcetam  pantoprazole  Injectable  atorvastatin  	      LABS:	 	                            9.6    8.33  )-----------( 216      ( 30 Jun 2022 07:03 )             29.7     06-30    142  |  108<H>  |  17.6  ----------------------------<  109<H>  3.9   |  22.0  |  1.94<H>    Ca    9.0      30 Jun 2022 07:03  Mg     1.9     06-30        TELEMETRY: Pt is not on telemetry-plan for discharge today via Hospitalist team  . 	                                                                      [facility]                                                               Office: [address] [address]                                                              Telephone: [phone]. Fax:[phone]                                                                             PROGRESS NOTE  Reason for follow up: CAD / AF  Overnight: No new events.   Update: Hgb 9.6 Plan for discharge today-eliquis and Baby ASA  to be restarted.    Subjective: "  _______I want to go home _______________"      General: No fatigue, no fevers/chills  Respiratory: No dyspnea, no cough, no wheeze  CV: No chest pain, no palpitations  Abd: No nausea  Neuro: No headache, no dizziness  	  Vitals:  T(C): 36.9 (06-30-22 @ 05:15), Max: 36.9 (06-29-22 @ 16:50)  HR: 80 (06-30-22 @ 05:15) (60 - 80)  BP: 130/66 (06-30-22 @ 05:15) (124/69 - 135/72)  RR: 18 (06-30-22 @ 05:15) (18 - 18)  SpO2: 99% (06-30-22 @ 05:15) (95% - 99%)  Wt(kg): --  I&O's Summary    Weight (kg): 90 (06-28 @ 21:46)      PHYSICAL EXAM:  Appearance: Comfortable. No acute distress  HEENT:  Head and neck: Atraumatic. Normocephalic.  Normal oral mucosa, PERRL, Neck is supple. No JVD, No carotid bruit.   Neurologic: A & O x 3, no focal deficits. EOMI.  Lymphatic: No cervical lymphadenopathy  Cardiovascular: Normal S1 S2, No murmur, rubs/gallops. No JVD, No edema  Respiratory: Lungs clear to auscultation  Gastrointestinal:  Soft, Non-tender, + BS  Lower Extremities: No edema  Psychiatry: Patient is calm. No agitation. Mood & affect appropriate  Skin: No rashes/ ecchymoses/cyanosis/ulcers visualized on the face, hands or feet.      CURRENT MEDICATIONS:  amLODIPine   Tablet 5 milliGRAM(s) Oral daily  metoprolol succinate ER 25 milliGRAM(s) Oral daily  tamsulosin 0.4 milliGRAM(s) Oral at bedtime    levETIRAcetam  pantoprazole  Injectable  atorvastatin  	      LABS:	 	                            9.6    8.33  )-----------( 216      ( 30 Jun 2022 07:03 )             29.7     06-30    142  |  108<H>  |  17.6  ----------------------------<  109<H>  3.9   |  22.0  |  1.94<H>    Ca    9.0      30 Jun 2022 07:03  Mg     1.9     06-30        TELEMETRY: Pt is not on telemetry-plan for discharge today via Hospitalist team  .

## 2022-06-30 NOTE — PROGRESS NOTE ADULT - PROBLEM SELECTOR PLAN 1
Rectal bleeding now resolved as per patient. Normocytic anemia. S/p Flex sigmoidoscopy 03/21 revealing ileorectal anastomosis with AVMs around the site.   Last EGD/ Colonoscopy with Dr. Nieto Nov/2020 revealing hiatal hernia, unremarkable colonoscopy.   Hemoglobin remain stable, today 8.5 gm. Low Iron panel saturation. SANTOS slowly improving.   Will plan for Colonoscopy tomorrow  Colonoscopy prep for colonoscopy prep  Type and screen COVID negative on 06/27  Cardiology clearance obtained  Continue clear liquid diet  today. Please keep NPO after midnight.   Trend CMP, Coags   Continue to hold Eliquis
of unclear etiology, possibly hemorrhoidal,  s/p colonoscopy, visualizing subtotal colectomy anastomosis, and internal hemorrhoids.  Advance diet as tolerated.  PPI for cytoprotection  Ok to restart AC  Anusol q HS

## 2022-06-30 NOTE — DISCHARGE NOTE PROVIDER - HOSPITAL COURSE
62y/oM PMH aortic dissection s/p repair (2010), surgery complicated by bowel ischemia s/p bowel resection and ostomy with reversal; bio AVR/MVR, HFpEF, afib on Eliquis, recent dc Nov 2021 for SBO s/p lysis of adhesions referred to ER by cardiologist for oupt labs with anemia. Pt noted rectal bleeding on weekend June 18/19, which stopped. Evaluated by cardio and GI, advised to stop Eliquis and check labs, admitted for acute anemia. s/p colonoscopy 6/29: no avm, no bleeding; +slight scope trauma, +internal hemorrhoids; rec to resume regular diet, dc home. pt to restart eliquis, will rx anusol HC cream

## 2022-06-30 NOTE — DISCHARGE NOTE PROVIDER - CARE PROVIDER_API CALL
Farrukh Turcios (MD)  Cardiovascular Disease  [address], [address]  [place], [place] [id number]  Phone: [phone]  Fax: [phone]  Follow Up Time:     Lacie Garzon (DO)  Gastroenterology  [address], [address]  [place] [address]  Phone: [phone]  Fax: [phone]  Follow Up Time:     PMD,   Phone: (   )    -  Fax: (   )    -  Follow Up Time:     Dago Reyes)  Internal Medicine; Nephrology  [address] [place] [address]  Phone: [phone]  Fax: [phone]  Follow Up Time:

## 2022-06-30 NOTE — PROGRESS NOTE ADULT - NS ATTEND AMEND GEN_ALL_CORE FT
I evaluated this pt. with my NP and agree with the above assessment and management plan. Pt. appears stable for discharge home from a GI standpoint for probable hemorrhoidal bleed. I evaluated this pt. with my NP and agree with the above assessment and management plan. Pt. appears stable for discharge home from a GI standpoint for probable hemorrhoidal bleed. Latonia send home on Anusol HC 25 mg. QHS PRN further hemorrhoidal bleeding.

## 2022-06-30 NOTE — DISCHARGE NOTE NURSING/CASE MANAGEMENT/SOCIAL WORK - NSDCPEFALRISK_GEN_ALL_CORE
For information on Fall & Injury Prevention, visit: [email or web address] OR  [email or web address] OR  https://www.cdc.gov/steadi/patient.html

## 2022-06-30 NOTE — DISCHARGE NOTE PROVIDER - NSDCCPCAREPLAN_GEN_ALL_CORE_FT
PRINCIPAL DISCHARGE DIAGNOSIS  Diagnosis: Anemia due to acute blood loss  Assessment and Plan of Treatment:       SECONDARY DISCHARGE DIAGNOSES  Diagnosis: SANTOS (acute kidney injury)  Assessment and Plan of Treatment:     Diagnosis: GI bleed  Assessment and Plan of Treatment:

## 2022-06-30 NOTE — DISCHARGE NOTE PROVIDER - PROVIDER TOKENS
PROVIDER:[TOKEN:[[id number]]],PROVIDER:[TOKEN:[[id number]]],FREE:[LAST:[PMD],PHONE:[(   )    -],FAX:[(   )    -]],PROVIDER:[TOKEN:[[id number]]]

## 2022-06-30 NOTE — DISCHARGE NOTE PROVIDER - NSDCMRMEDTOKEN_GEN_ALL_CORE_FT
acetaminophen 325 mg oral tablet: 2 tab(s) orally every 6 hours, As needed, Mild Pain (1 - 3)  amLODIPine 5 mg oral tablet: 1 tab(s) orally once a day  atorvastatin 40 mg oral tablet: 1 tab(s) orally once a day (at bedtime)  Eliquis 5 mg oral tablet: 1  orally once a day  levETIRAcetam 750 mg oral tablet: 1 tab(s) orally once a day  losartan 25 mg oral tablet: 1 tab(s) orally once a day  metoprolol succinate 25 mg oral tablet, extended release: 1 tab(s) orally once a day  pantoprazole 40 mg oral delayed release tablet: 1 tab(s) orally once a day (before a meal)  sevelamer hydrochloride 800 mg oral tablet: 1 tab(s) orally 2 times a day (with meals)  tamsulosin 0.4 mg oral capsule: 1 cap(s) orally once a day (at bedtime)  torsemide 20 mg oral tablet: 1 tab(s) orally once a day  traMADol 50 mg oral tablet: 0.5 tab(s) orally every 4 hours, As needed, Moderate Pain (4 - 6)   acetaminophen 325 mg oral tablet: 2 tab(s) orally every 6 hours, As needed, Mild Pain (1 - 3)  amLODIPine 5 mg oral tablet: 1 tab(s) orally once a day  Anusol-HC 2.5% rectal cream with applicator: 1 application rectally 4 times a day, As Needed   aspirin 81 mg oral tablet: 1 tab(s) orally once a day  atorvastatin 40 mg oral tablet: 1 tab(s) orally once a day (at bedtime)  Eliquis 5 mg oral tablet: 1  orally once a day  levETIRAcetam 750 mg oral tablet: 1 tab(s) orally once a day  metoprolol succinate 25 mg oral tablet, extended release: 1 tab(s) orally once a day  pantoprazole 40 mg oral delayed release tablet: 1 tab(s) orally once a day (before a meal)  sevelamer hydrochloride 800 mg oral tablet: 1 tab(s) orally 2 times a day (with meals)  tamsulosin 0.4 mg oral capsule: 1 cap(s) orally once a day (at bedtime)  torsemide 20 mg oral tablet: 1 tab(s) orally once a day  traMADol 50 mg oral tablet: 0.5 tab(s) orally every 4 hours, As needed, Moderate Pain (4 - 6)

## 2022-06-30 NOTE — DISCHARGE NOTE PROVIDER - ATTENDING DISCHARGE PHYSICAL EXAMINATION:
Vital Signs Last 24 Hrs  T(C): 36.9 (30 Jun 2022 05:15), Max: 36.9 (29 Jun 2022 16:50)  T(F): 98.4 (30 Jun 2022 05:15), Max: 98.4 (29 Jun 2022 16:50)  HR: 80 (30 Jun 2022 05:15) (60 - 80)  BP: 130/66 (30 Jun 2022 05:15) (124/69 - 135/72)  BP(mean): --  RR: 18 (30 Jun 2022 05:15) (18 - 18)  SpO2: 99% (30 Jun 2022 05:15) (95% - 99%) Vital Signs Last 24 Hrs  T(C): 36.9 (30 Jun 2022 05:15), Max: 36.9 (29 Jun 2022 16:50)  T(F): 98.4 (30 Jun 2022 05:15), Max: 98.4 (29 Jun 2022 16:50)  HR: 80 (30 Jun 2022 05:15) (60 - 80)  BP: 130/66 (30 Jun 2022 05:15) (124/69 - 135/72)  BP(mean): --  RR: 18 (30 Jun 2022 05:15) (18 - 18)  SpO2: 99% (30 Jun 2022 05:15) (95% - 99%)    GENERAL: NAD  HEENT: PERRL, +EOMI  NECK: soft, supple  CHEST/LUNG: Clear to auscultation bilaterally  HEART: S1S2+, Regular rate and rhythm  ABDOMEN: Soft, Nontender, Nondistended; Bowel sounds present  SKIN: warm, dry  NEURO: AAOX3, grossly non-focal  PSYCH: normal affect

## 2022-06-30 NOTE — DISCHARGE NOTE PROVIDER - CARE PROVIDERS DIRECT ADDRESSES
,DirectAddress_Unknown,[email or web address],DirectAddress_Unknown,[email or web address]

## 2022-06-30 NOTE — DISCHARGE NOTE NURSING/CASE MANAGEMENT/SOCIAL WORK - PATIENT PORTAL LINK FT
You can access the FollowMyHealth Patient Portal offered by [facility] by registering at the following website: [email or web address]. By joining [email or web address]’s FollowMyHealth portal, you will also be able to view your health information using other applications (apps) compatible with our system.

## 2022-07-20 ENCOUNTER — NON-APPOINTMENT (OUTPATIENT)
Age: 62
End: 2022-07-20

## 2022-07-20 ENCOUNTER — APPOINTMENT (OUTPATIENT)
Dept: FAMILY MEDICINE | Facility: CLINIC | Age: 62
End: 2022-07-20

## 2022-07-20 VITALS
OXYGEN SATURATION: 99 % | WEIGHT: 185 LBS | BODY MASS INDEX: 28.04 KG/M2 | HEART RATE: 60 BPM | SYSTOLIC BLOOD PRESSURE: 138 MMHG | HEIGHT: 68 IN | DIASTOLIC BLOOD PRESSURE: 78 MMHG

## 2022-07-20 DIAGNOSIS — Z99.2 END STAGE RENAL DISEASE: ICD-10-CM

## 2022-07-20 DIAGNOSIS — Z78.9 OTHER SPECIFIED HEALTH STATUS: ICD-10-CM

## 2022-07-20 DIAGNOSIS — Z87.891 PERSONAL HISTORY OF NICOTINE DEPENDENCE: ICD-10-CM

## 2022-07-20 DIAGNOSIS — N18.6 END STAGE RENAL DISEASE: ICD-10-CM

## 2022-07-20 DIAGNOSIS — F14.90 COCAINE USE, UNSPECIFIED, UNCOMPLICATED: ICD-10-CM

## 2022-07-20 PROCEDURE — G0296 VISIT TO DETERM LDCT ELIG: CPT

## 2022-07-20 PROCEDURE — 99204 OFFICE O/P NEW MOD 45 MIN: CPT | Mod: 25

## 2022-07-20 RX ORDER — TAMSULOSIN HYDROCHLORIDE 0.4 MG/1
0.4 CAPSULE ORAL
Qty: 90 | Refills: 1 | Status: ACTIVE | COMMUNITY
Start: 1900-01-01 | End: 1900-01-01

## 2022-07-26 NOTE — HISTORY OF PRESENT ILLNESS
[FreeTextEntry1] : Mr. Cummins is a pleasant 61 year old male here for follow up today. He has a history of atrial fibrillation/flutter s/p catheter ablation, aortic dissection s/p repair, CVA with residual left weakness, bowel ischemia s/p resection, ESRD on dialysis, HTN, AVMs s/p cauterization, CAD s/p PCI/CABG, mitral valve s/p repair, and mild cardiomyopathy (EF 45-50%). \par \par The patient underwent catheter ablation for symptomatic atrial fibrillation/flutter on 9/14/2021 (PVI, CTI, mitral flutter line). He has done well from an arrhythmia standpoint since then with no recurrences. He states that he underwent bowel surgery recently for small bowel obstruction. He has been off dialysis and notes some lower extremity swelling. He states that he was on torsemide 20 mg on alternate days in the past and did well on it. He denies any palpitations, chest pain, or syncope. \par \par INCOMPLETE NOTE NOT YET SEEN / FREQUENT PVS?

## 2022-07-27 ENCOUNTER — APPOINTMENT (OUTPATIENT)
Dept: ELECTROPHYSIOLOGY | Facility: CLINIC | Age: 62
End: 2022-07-27

## 2022-08-02 ENCOUNTER — APPOINTMENT (OUTPATIENT)
Dept: GASTROENTEROLOGY | Facility: CLINIC | Age: 62
End: 2022-08-02

## 2022-08-02 ENCOUNTER — APPOINTMENT (OUTPATIENT)
Dept: CARDIOLOGY | Facility: CLINIC | Age: 62
End: 2022-08-02

## 2022-08-02 VITALS
SYSTOLIC BLOOD PRESSURE: 140 MMHG | HEIGHT: 68 IN | OXYGEN SATURATION: 97 % | RESPIRATION RATE: 14 BRPM | DIASTOLIC BLOOD PRESSURE: 80 MMHG | BODY MASS INDEX: 28.49 KG/M2 | HEART RATE: 63 BPM | WEIGHT: 188 LBS

## 2022-08-02 PROBLEM — N18.6 ESRD (END STAGE RENAL DISEASE) ON DIALYSIS: Status: RESOLVED | Noted: 2020-12-21 | Resolved: 2022-08-02

## 2022-08-02 PROBLEM — Z87.891 FORMER SMOKER: Status: ACTIVE | Noted: 2022-07-20

## 2022-08-02 PROCEDURE — 99213 OFFICE O/P EST LOW 20 MIN: CPT

## 2022-08-02 NOTE — HISTORY OF PRESENT ILLNESS
[FreeTextEntry1] : ESTABLISH CARE [de-identified] : MR. GIBSON IS A PLEASANT 63 YO PRESENTING TO [facility] CARE WITH AN EXTENSIVE MEDICAL HISTORY MONITORED BY MULTIPLE SPECIALISTS.  HE HAS A HISTORY OF AORTIC DISSECTION S/P REPAIR, CABG X 2, CHF, CVA, MITRAL AND AORTIC VALVE REPLACEMENT, AFIB, ESRD INITIALLY ON DIALYSIS BUT NO LONGER, COLECTOMY WITH OSTOMY REVERSAL AND RECENT EXPLORATORY LAP/LYSIS OF ADHESIONS FOR SBO AND RECENT ADMISSION FOR RECTAL BLEEDING AND ANEMIA WITH REPORTEDLY COLONOSCOPY AND ENDOSCOPY IN JUNE 2022.  WAS PLACED BACK ON ELIQUIS.  HAS HAD NO FALLS OR SIGNS OR SYMPTOMS OF BLEEDING.  HE QUIT TOBACCO IN 2010.  SMOKED 30 - 40 YEARS ON AVERAGE 1 1/2 PPD.  HAD COVID BOOSTER.  STATES HE HAD PNEUMONIA VACCINATIONS WHEN GOING TO DIALYSIS, STATES HAD TETANUS AND SHINGLES VACCINATION AS WELL.  HE HAD A PRIOR STROKE 2010 POST AORTIC ANEURYSMS REPAIR WITH RESIDUAL LEFT SIDED WEAKNESS.  HE ALSO HAS A HISTORY OF SEIZURE.  DOES NOT DRIVE.  HAS NOT RECENTLY FOLLOWED UP WITH HIS NEUROLOGIST.  TAKES MEDICATION "AS NEEDED" AND NOT AS DIRECTED.  NO RECENT SEIZURE - LAST 2010.\par \par CARDIOLOGY: DR. VALENTINO\par EPS: DR. JACK\par GI : DR. MCFARLANE\par VASCULAR: DR. BLANCHARD\par NEPHROLOGY: DR. DAVID\par DERMATOLOGY: BEING TREATED FOR PSORIASIS\par NEUROLOGY: DR. DUFFY; OVERDUE TO SEE; LAST SEIZURE 2010

## 2022-08-02 NOTE — HISTORY OF PRESENT ILLNESS
[de-identified] : Patient is a 62 year year old male, with PMH of aortic dissection s/p repair in 2020 with CABG x 2, CHF, CVA, mitral and aortic valve replacement, a.fib, h/o ESRD on HD but off HD since October 2021, s/p colectomy with ostomy and reversal >10 years ago and recent exploratory lap/lysis of adhesions for SBO a few months ago, who presents for follow up visit s/p recent hospitalization for anemia and rectal bleeding. \par \par Pt was initially seen in our office on 6/22/22 and was c/o hard BM then had BRBPR when wiping and in the toilet water. Symptoms persisted for a few days and he had stopped Eliquis, per cardio recommendation. He was admitted on 6/27/22 for anemia, Hgb 7.8 in ER, and rectal bleeding. Most recent labs on 6/30/22 show Hgb 9.6. \par \par While admitted to [facility], pt had a colonoscopy on 6/29/22 which showed s/p subtotal colectomy with anastamosis 10cm from anal verge, appears to be end to side anastamosis. There is neovasculature at the anastamosis site. No AVM, no bleeding. Slight scope trauma +IH, scope passed to 60cm from anal verge. Normal TI. \par \par Previously, he was seen by GI for anemia in November 2020 and underwent EGD/colonoscopy at that time which did not provide a source of bleeding at that time. He has a chronic h/o anemia, most recent labs done April 2021 showed Hgb 10.1 with normal MCV.\par \par Pt has chronic diarrhea and take Lomotil prn which helps control his diarrhea when he plans to go on long trips or leaving the house for long period of time. \par \par Pt has a h/o CKD and was on HD in the past, stopped dialysis in October 2021. Most recent labs in April 2022 showed Cr 1.6. Follows with nephrology. \par \par Patient denies any significant pulmonary conditions. \par \par Patient denies pyrosis, dysphagia, abdominal pain, nausea, vomiting, or unexplained weight loss.\par

## 2022-08-02 NOTE — PLAN
[FreeTextEntry1] : REQUESTING [facility] NEUROLOGY - CONTACT INFORMATION GIVEN\par CONSULTANT NOTES REVIEWED AS WELL AS PRIOR LAB WORK AND IMAGING\par WILL GET UPDATED LAB WORK FOR ANEMIA\par SCREENING CT CHEST DUE TO TOBACCO USE HISTORY\par BLOOD PRESSURE CONTROLLED\par TO OBTAIN VACCINATION RECORDS FOR REVIEW\par FOLLOW-UP ALL SPECIALISTS AS DIRECTED \par AWARE WHEN TO SEEK EMERGENT CARE\par CALL WITH ANY QUESTIONS, CONCERNS OR CHANGES

## 2022-08-12 ENCOUNTER — NON-APPOINTMENT (OUTPATIENT)
Age: 62
End: 2022-08-12

## 2022-08-12 ENCOUNTER — APPOINTMENT (OUTPATIENT)
Dept: NEPHROLOGY | Facility: CLINIC | Age: 62
End: 2022-08-12

## 2022-08-12 VITALS
HEART RATE: 63 BPM | OXYGEN SATURATION: 98 % | WEIGHT: 181 LBS | SYSTOLIC BLOOD PRESSURE: 146 MMHG | BODY MASS INDEX: 27.43 KG/M2 | HEIGHT: 68 IN | DIASTOLIC BLOOD PRESSURE: 64 MMHG

## 2022-08-12 DIAGNOSIS — K92.1 MELENA: ICD-10-CM

## 2022-08-12 DIAGNOSIS — Z00.00 ENCOUNTER FOR GENERAL ADULT MEDICAL EXAMINATION W/OUT ABNORMAL FINDINGS: ICD-10-CM

## 2022-08-12 PROCEDURE — 93000 ELECTROCARDIOGRAM COMPLETE: CPT

## 2022-08-12 PROCEDURE — 36415 COLL VENOUS BLD VENIPUNCTURE: CPT

## 2022-08-12 PROCEDURE — 99214 OFFICE O/P EST MOD 30 MIN: CPT | Mod: 25

## 2022-08-12 RX ORDER — POTASSIUM CHLORIDE 750 MG/1
10 TABLET, FILM COATED, EXTENDED RELEASE ORAL
Qty: 30 | Refills: 0 | Status: DISCONTINUED | COMMUNITY
Start: 2022-05-31 | End: 2022-08-12

## 2022-08-12 RX ORDER — MULTIVITAMIN
TABLET ORAL DAILY
Refills: 0 | Status: DISCONTINUED | COMMUNITY
End: 2022-08-12

## 2022-08-12 NOTE — PHYSICAL EXAM
[General Appearance - Alert] : alert [General Appearance - In No Acute Distress] : in no acute distress [Sclera] : the sclera and conjunctiva were normal [Extraocular Movements] : extraocular movements were intact [PERRL With Normal Accommodation] : pupils were equal in size, round, and reactive to light [Outer Ear] : the ears and nose were normal in appearance [Oropharynx] : the oropharynx was normal [Neck Appearance] : the appearance of the neck was normal [Neck Cervical Mass (___cm)] : no neck mass was observed [Jugular Venous Distention Increased] : there was no jugular-venous distention [Thyroid Diffuse Enlargement] : the thyroid was not enlarged [Thyroid Nodule] : there were no palpable thyroid nodules [Heart Rate And Rhythm] : heart rate was normal and rhythm regular [Heart Sounds] : normal S1 and S2 [Heart Sounds Gallop] : no gallops [Heart Sounds Pericardial Friction Rub] : no pericardial rub [Full Pulse] : the pedal pulses are present [Edema] : there was no peripheral edema [Bowel Sounds] : normal bowel sounds [Abdomen Soft] : soft [Abdomen Tenderness] : non-tender [Abdomen Mass (___ Cm)] : no abdominal mass palpated [Cervical Lymph Nodes Enlarged Posterior Bilaterally] : posterior cervical [Cervical Lymph Nodes Enlarged Anterior Bilaterally] : anterior cervical [Supraclavicular Lymph Nodes Enlarged Bilaterally] : supraclavicular [Axillary Lymph Nodes Enlarged Bilaterally] : axillary [Femoral Lymph Nodes Enlarged Bilaterally] : femoral [Inguinal Lymph Nodes Enlarged Bilaterally] : inguinal [No CVA Tenderness] : no ~M costovertebral angle tenderness [No Spinal Tenderness] : no spinal tenderness [Abnormal Walk] : normal gait [Nail Clubbing] : no clubbing  or cyanosis of the fingernails [Musculoskeletal - Swelling] : no joint swelling seen [Motor Tone] : muscle strength and tone were normal [Skin Color & Pigmentation] : normal skin color and pigmentation [Skin Turgor] : normal skin turgor [] : no rash [Deep Tendon Reflexes (DTR)] : deep tendon reflexes were 2+ and symmetric [Sensation] : the sensory exam was normal to light touch and pinprick [No Focal Deficits] : no focal deficits [Oriented To Time, Place, And Person] : oriented to person, place, and time [Impaired Insight] : insight and judgment were intact [Affect] : the affect was normal [Decreased Breath Sounds] : decreased breath sounds [Systolic grade ___/6] : A grade [unfilled]/6 systolic murmur was heard. [FreeTextEntry1] : Surgical Scar,

## 2022-08-12 NOTE — HISTORY OF PRESENT ILLNESS
[FreeTextEntry1] : ESRD - Recovered, \par \par S.P Colectomy, \par \par + Heart Murmur, Assessment\par Atrial fibrillation (427.31) (I48.91)\par Coronary artery disease with other form of angina pectoris (414.00,413.9) (I25.118)\par ESRD (end stage renal disease) on dialysis (585.6,V45.11) (N18.6,Z99.2)\par HLD (hyperlipidemia) (272.4) (E78.5)\par Benign essential hypertension (401.1) (I10)\par \par Plan\par Renew: Amiodarone HCl - 200 MG Oral Tablet; TAKE 0.5 TABLET Daily\par Renew: Metoprolol Succinate ER 50 MG Oral Tablet Extended Release 24 Hour; TAKE 1\par TABLET DAILY\par Renew: amLODIPine Besylate 5 MG Oral Tablet; TAKE 1 TABLET DAILY\par Hold : Losartan Potassium 25 MG \par Renew: Atorvastatin Calcium 40 MG Oral Tablet; TAKE 1 TABLET AT BEDTIME\par \par \par Discussion/Summary\par 1. ESRD: Now Off HD, \par 2. Afib- Now in sinus. \par 3. CAD- s/p CABG, LVEF 45-50% on recent TTE, Continue aspirin, metoprolol, and statin. Will plan for stress testing in the future to assess for any ischemia considering plans for evaluation of renal transplant. \par 4. AS/MR- s/p AVR and MVR, on ASA. \par \par Feels well,

## 2022-08-12 NOTE — ASSESSMENT
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 States off dialysis now. Has no  lower extremity edema. Making urine. \par \par Will resume torsemide 20 mg every other day, PRN \par \par Hold ARB, \par \par F.U Q 4 mo.,

## 2022-08-15 LAB
25(OH)D3 SERPL-MCNC: 34.5 NG/ML
ALBUMIN SERPL ELPH-MCNC: 4.5 G/DL
ALBUMIN SERPL ELPH-MCNC: 4.7 G/DL
ALP BLD-CCNC: 99 U/L
ALT SERPL-CCNC: 17 U/L
ANION GAP SERPL CALC-SCNC: 16 MMOL/L
ANION GAP SERPL CALC-SCNC: 21 MMOL/L
APPEARANCE: CLEAR
AST SERPL-CCNC: 18 U/L
BACTERIA: NEGATIVE
BASOPHILS # BLD AUTO: 0.07 K/UL
BASOPHILS NFR BLD AUTO: 1.1 %
BILIRUB SERPL-MCNC: 0.7 MG/DL
BILIRUBIN URINE: NEGATIVE
BLOOD URINE: NEGATIVE
BUN SERPL-MCNC: 33 MG/DL
BUN SERPL-MCNC: 33 MG/DL
CALCIUM SERPL-MCNC: 9.5 MG/DL
CALCIUM SERPL-MCNC: 9.6 MG/DL
CALCIUM SERPL-MCNC: 9.6 MG/DL
CHLORIDE SERPL-SCNC: 103 MMOL/L
CHLORIDE SERPL-SCNC: 103 MMOL/L
CHOLEST SERPL-MCNC: 117 MG/DL
CO2 SERPL-SCNC: 18 MMOL/L
CO2 SERPL-SCNC: 22 MMOL/L
COLOR: COLORLESS
CREAT SERPL-MCNC: 1.8 MG/DL
CREAT SERPL-MCNC: 1.81 MG/DL
CREAT SPEC-SCNC: 30 MG/DL
CREAT/PROT UR: 1.3 RATIO
EGFR: 42 ML/MIN/1.73M2
EGFR: 42 ML/MIN/1.73M2
EOSINOPHIL # BLD AUTO: 0.53 K/UL
EOSINOPHIL NFR BLD AUTO: 8.6 %
ESTIMATED AVERAGE GLUCOSE: 94 MG/DL
FERRITIN SERPL-MCNC: 288 NG/ML
GLUCOSE QUALITATIVE U: NEGATIVE
GLUCOSE SERPL-MCNC: 94 MG/DL
GLUCOSE SERPL-MCNC: 95 MG/DL
HBA1C MFR BLD HPLC: 4.9 %
HCT VFR BLD CALC: 38.3 %
HDLC SERPL-MCNC: 53 MG/DL
HGB BLD-MCNC: 11.8 G/DL
HYALINE CASTS: 0 /LPF
IMM GRANULOCYTES NFR BLD AUTO: 0.2 %
IRON SATN MFR SERPL: 16 %
IRON SERPL-MCNC: 49 UG/DL
KETONES URINE: NEGATIVE
LDLC SERPL CALC-MCNC: 51 MG/DL
LEUKOCYTE ESTERASE URINE: NEGATIVE
LYMPHOCYTES # BLD AUTO: 1.45 K/UL
LYMPHOCYTES NFR BLD AUTO: 23.5 %
MAN DIFF?: NORMAL
MCHC RBC-ENTMCNC: 29.3 PG
MCHC RBC-ENTMCNC: 30.8 GM/DL
MCV RBC AUTO: 95 FL
MICROSCOPIC-UA: NORMAL
MONOCYTES # BLD AUTO: 0.49 K/UL
MONOCYTES NFR BLD AUTO: 8 %
NEUTROPHILS # BLD AUTO: 3.61 K/UL
NEUTROPHILS NFR BLD AUTO: 58.6 %
NITRITE URINE: NEGATIVE
NONHDLC SERPL-MCNC: 64 MG/DL
NT-PROBNP SERPL-MCNC: 1649 PG/ML
PARATHYROID HORMONE INTACT: 54 PG/ML
PH URINE: 6
PHOSPHATE SERPL-MCNC: 3.9 MG/DL
PLATELET # BLD AUTO: 162 K/UL
POTASSIUM SERPL-SCNC: 3.8 MMOL/L
POTASSIUM SERPL-SCNC: 3.8 MMOL/L
PROT SERPL-MCNC: 7.4 G/DL
PROT UR-MCNC: 38 MG/DL
PROTEIN URINE: ABNORMAL
RBC # BLD: 4.03 M/UL
RBC # FLD: 14.9 %
RED BLOOD CELLS URINE: 1 /HPF
SODIUM SERPL-SCNC: 141 MMOL/L
SODIUM SERPL-SCNC: 143 MMOL/L
SPECIFIC GRAVITY URINE: 1.01
SQUAMOUS EPITHELIAL CELLS: 0 /HPF
TIBC SERPL-MCNC: 302 UG/DL
TRIGL SERPL-MCNC: 67 MG/DL
UIBC SERPL-MCNC: 253 UG/DL
URATE SERPL-MCNC: 10.2 MG/DL
UROBILINOGEN URINE: NORMAL
WBC # FLD AUTO: 6.16 K/UL
WHITE BLOOD CELLS URINE: 0 /HPF

## 2022-08-18 ENCOUNTER — NON-APPOINTMENT (OUTPATIENT)
Age: 62
End: 2022-08-18

## 2022-08-18 ENCOUNTER — OUTPATIENT (OUTPATIENT)
Dept: OUTPATIENT SERVICES | Facility: HOSPITAL | Age: 62
LOS: 1 days | End: 2022-08-18
Payer: MEDICARE

## 2022-08-18 ENCOUNTER — APPOINTMENT (OUTPATIENT)
Dept: RADIOLOGY | Facility: CLINIC | Age: 62
End: 2022-08-18

## 2022-08-18 ENCOUNTER — APPOINTMENT (OUTPATIENT)
Dept: CARDIOLOGY | Facility: CLINIC | Age: 62
End: 2022-08-18

## 2022-08-18 VITALS
DIASTOLIC BLOOD PRESSURE: 60 MMHG | HEIGHT: 68 IN | TEMPERATURE: 99 F | WEIGHT: 176 LBS | BODY MASS INDEX: 26.67 KG/M2 | OXYGEN SATURATION: 97 % | HEART RATE: 60 BPM | SYSTOLIC BLOOD PRESSURE: 134 MMHG

## 2022-08-18 DIAGNOSIS — I48.91 UNSPECIFIED ATRIAL FIBRILLATION: ICD-10-CM

## 2022-08-18 DIAGNOSIS — Z86.79 PERSONAL HISTORY OF OTHER DISEASES OF THE CIRCULATORY SYSTEM: Chronic | ICD-10-CM

## 2022-08-18 DIAGNOSIS — I77.0 ARTERIOVENOUS FISTULA, ACQUIRED: Chronic | ICD-10-CM

## 2022-08-18 DIAGNOSIS — Z90.49 ACQUIRED ABSENCE OF OTHER SPECIFIED PARTS OF DIGESTIVE TRACT: Chronic | ICD-10-CM

## 2022-08-18 DIAGNOSIS — Z95.2 PRESENCE OF PROSTHETIC HEART VALVE: Chronic | ICD-10-CM

## 2022-08-18 DIAGNOSIS — Z95.1 PRESENCE OF AORTOCORONARY BYPASS GRAFT: Chronic | ICD-10-CM

## 2022-08-18 PROCEDURE — 99215 OFFICE O/P EST HI 40 MIN: CPT

## 2022-08-18 PROCEDURE — 71046 X-RAY EXAM CHEST 2 VIEWS: CPT | Mod: 26

## 2022-08-18 PROCEDURE — 93000 ELECTROCARDIOGRAM COMPLETE: CPT

## 2022-08-18 PROCEDURE — 71046 X-RAY EXAM CHEST 2 VIEWS: CPT

## 2022-08-18 RX ORDER — RISANKIZUMAB-RZAA 150 MG/ML
150 INJECTION SUBCUTANEOUS
Qty: 1 | Refills: 0 | Status: DISCONTINUED | COMMUNITY
Start: 2022-05-13 | End: 2022-08-18

## 2022-08-18 RX ORDER — TORSEMIDE 20 MG/1
20 TABLET ORAL
Qty: 45 | Refills: 1 | Status: DISCONTINUED | COMMUNITY
End: 2022-08-18

## 2022-08-18 RX ORDER — ASCORBIC ACID, THIAMINE, RIBOFLAVIN, NIACINAMIDE, PYRIDOXINE, FOLIC ACID, COBALAMIN, BIOTIN, PANTOTHENIC ACID 100; 1.5; 1.7; 20; 10; 1; 6; 300; 1 MG/1; MG/1; MG/1; MG/1; MG/1; MG/1; UG/1; UG/1; MG/1
TABLET, COATED ORAL
Qty: 90 | Refills: 3 | Status: DISCONTINUED | COMMUNITY
Start: 2021-08-19 | End: 2022-08-18

## 2022-08-18 RX ORDER — LEVETIRACETAM 750 MG/1
750 TABLET, FILM COATED ORAL TWICE DAILY
Refills: 0 | Status: DISCONTINUED | COMMUNITY
End: 2022-08-18

## 2022-08-21 NOTE — PROGRESS NOTE ADULT - ASSESSMENT
1) ESRD on HD  2) SBo s/p small bowel resection  3) Anemia of CKD    HD MWF schedule  Hb below goal- continue REINALDO, PRBC as indicated  repeat iron studies  Pt NPO- continue hydration  Monitor I/O's   4 = No assist / stand by assistance

## 2022-08-24 ENCOUNTER — NON-APPOINTMENT (OUTPATIENT)
Age: 62
End: 2022-08-24

## 2022-08-24 ENCOUNTER — APPOINTMENT (OUTPATIENT)
Dept: ELECTROPHYSIOLOGY | Facility: CLINIC | Age: 62
End: 2022-08-24

## 2022-08-24 VITALS
HEART RATE: 61 BPM | OXYGEN SATURATION: 96 % | SYSTOLIC BLOOD PRESSURE: 118 MMHG | BODY MASS INDEX: 26.98 KG/M2 | WEIGHT: 178 LBS | DIASTOLIC BLOOD PRESSURE: 58 MMHG | HEIGHT: 68 IN | TEMPERATURE: 97.7 F

## 2022-08-24 DIAGNOSIS — I48.91 UNSPECIFIED ATRIAL FIBRILLATION: ICD-10-CM

## 2022-08-24 PROCEDURE — 93246 EXT ECG>7D<15D RECORDING: CPT

## 2022-08-24 PROCEDURE — 99214 OFFICE O/P EST MOD 30 MIN: CPT

## 2022-08-24 PROCEDURE — 93000 ELECTROCARDIOGRAM COMPLETE: CPT | Mod: 59

## 2022-09-02 ENCOUNTER — APPOINTMENT (OUTPATIENT)
Dept: CARDIOLOGY | Facility: CLINIC | Age: 62
End: 2022-09-02

## 2022-09-02 PROCEDURE — 93306 TTE W/DOPPLER COMPLETE: CPT

## 2022-09-28 NOTE — CONSULT NOTE ADULT - ASSESSMENT
60y man with multiple comorbidities consulted for hemodialysis catheter placement. Patient to be optimized for possible cardiac intervention. Consented for insertion of catheter at bedside.    - Insertion of dialysis catheter at bedside  - Will need vein mapping for potential permanent HD access planning  - Pink band to protect left arm, which is non-dominant and less active 2/2 CVA     Hatchet Flap Text: The defect edges were debeveled with a #15 scalpel blade.  Given the location of the defect, shape of the defect and the proximity to free margins a hatchet flap was deemed most appropriate.  Using a sterile surgical marker, an appropriate hatchet flap was drawn incorporating the defect and placing the expected incisions within the relaxed skin tension lines where possible.    The area thus outlined was incised deep to adipose tissue with a #15 scalpel blade.  The skin margins were undermined to an appropriate distance in all directions utilizing iris scissors.

## 2022-10-04 NOTE — HISTORY OF PRESENT ILLNESS
[FreeTextEntry1] : Mr. Cummins is a pleasant 62 year old male here for follow up and management of pAF.\par \par To summarize, he has a history of atrial fibrillation/flutter s/p catheter ablation, aortic dissection s/p repair, CVA with residual left weakness, bowel ischemia s/p resection, ESRD on dialysis, HTN, AVMs s/p cauterization, CAD s/p PCI/CABG, mitral valve s/p repair, and mild cardiomyopathy (EF 45-50%). \par \par The patient underwent catheter ablation for symptomatic atrial fibrillation/flutter on 9/14/2021 (PVI, CTI, mitral flutter line).During previous f/u he reported he had been off dialysis and noted some lower extremity swelling.Asymptomatic frequent PVCs noted as well in past. \par \par MCOT worn 10/11/21-10/17/21 revealed no AF, SR with PVC burden 9%. \par \par Today, he reports he has been feeling very well since last follow up. Denies palpitations, or any symptomatic arrhythmia recurrence. ECG reveals SR with no ectopy. Maintained on Eliquis. Denies easy bruising, bleeding, or falls. Echo pending 9/2/22.

## 2022-10-04 NOTE — REVIEW OF SYSTEMS
[Headache] : no headache [Feeling Fatigued] : not feeling fatigued [SOB] : no shortness of breath [Dyspnea on exertion] : not dyspnea during exertion [Chest Discomfort] : no chest discomfort [Palpitations] : no palpitations [Orthopnea] : no orthopnea [Syncope] : no syncope [Dizziness] : no dizziness [Easy Bleeding] : no tendency for easy bleeding

## 2022-10-04 NOTE — DISCUSSION/SUMMARY
[EKG obtained to assist in diagnosis and management of assessed problem(s)] : EKG obtained to assist in diagnosis and management of assessed problem(s) [FreeTextEntry1] : Mr. Cummins is a pleasant 62 year old male here for follow up and management of pAF.\par \par To summarize, he has a history of atrial fibrillation/flutter s/p catheter ablation, aortic dissection s/p repair, CVA with residual left weakness, bowel ischemia s/p resection, ESRD on dialysis, HTN, AVMs s/p cauterization, CAD s/p PCI/CABG, mitral valve s/p repair, and mild cardiomyopathy (EF 45-50%). \par \par The patient underwent catheter ablation for symptomatic atrial fibrillation/flutter on 9/14/2021 (PVI, CTI, mitral flutter line).During previous f/u he reported he had been off dialysis and noted some lower extremity swelling.Asymptomatic frequent PVCs noted as well in past. \par \par MCOT worn 10/11/21-10/17/21 revealed no AF, SR with PVC burden 9%. \par \par Today, he reports he has been feeling very well since last follow up. Denies palpitations, or any symptomatic arrhythmia recurrence. ECG reveals SR with no ectopy. Maintained on Eliquis. Denies easy bruising, bleeding, or falls. Echo pending 9/2/22. \par \par Recommendation: \par \par -Mr. Cummins has done very well s/p ablation for symptomatic PAF nearly 1 year ago. Denies any symptomatic recurrence. History of asymptomatic frequent PVCs. Will repeat 1 week MCOT for AF surveillance and to reassess PVC burden. No ectopy on ECG today. We discussed if high burden PVCs noted >10% with history of cardiomyopathy, PVC suppression could be considered. Repeat echo planned for 9/2/22. Continue eliquis for stroke prophylaxis indefinitely. Reports tolerating well. \par -If monitor unrevealing he will continue cardiology f/u with Dr. Frankel, f/u with EP on an as needed basis.\par \par Lilli JAVIER-C

## 2022-10-04 NOTE — PHYSICAL EXAM
[Well Developed] : well developed [Well Nourished] : well nourished [Normal S1, S2] : normal S1, S2 [Murmur] : murmur [Normal Gait] : normal gait [No Edema] : no edema [Moves all extremities] : moves all extremities [Alert and Oriented] : alert and oriented [de-identified] : 3/6

## 2022-10-13 NOTE — H&P PST ADULT - TRANSFUSION REACTION, PREVIOUS, PROFILE
Pt c/o abd pain with diarrhea for a week. Pt is a/o x 4 calm, skin p/w/d resp even and non-labored. No sob or acute distress noted. no

## 2022-10-17 ENCOUNTER — APPOINTMENT (OUTPATIENT)
Dept: VASCULAR SURGERY | Facility: CLINIC | Age: 62
End: 2022-10-17

## 2022-10-19 ENCOUNTER — APPOINTMENT (OUTPATIENT)
Dept: FAMILY MEDICINE | Facility: CLINIC | Age: 62
End: 2022-10-19

## 2022-10-20 ENCOUNTER — APPOINTMENT (OUTPATIENT)
Dept: VASCULAR SURGERY | Facility: CLINIC | Age: 62
End: 2022-10-20

## 2022-10-20 VITALS
TEMPERATURE: 97.3 F | HEART RATE: 72 BPM | RESPIRATION RATE: 14 BRPM | WEIGHT: 180 LBS | OXYGEN SATURATION: 96 % | BODY MASS INDEX: 27.28 KG/M2 | HEIGHT: 68 IN | DIASTOLIC BLOOD PRESSURE: 64 MMHG | SYSTOLIC BLOOD PRESSURE: 178 MMHG

## 2022-10-20 VITALS — DIASTOLIC BLOOD PRESSURE: 60 MMHG | SYSTOLIC BLOOD PRESSURE: 195 MMHG

## 2022-10-20 PROCEDURE — 99212 OFFICE O/P EST SF 10 MIN: CPT

## 2022-10-25 NOTE — ASSESSMENT
[FreeTextEntry1] : 63yo male with CKD, HTN, with known renal artery stenosis and LUE AVG\par \par 1. Renal artery stenosis: recommend continued BP control and close monitoring of blood pressure at home; today in office BP elevated but he states he didn't take his medication and will as soon as he goes home. Rory continue antiplatelet and manage nonoperatively\par \par 2. Lower extremity pain and numbness: given description of symptoms and exam with palpable pulses, no venous disease these symptoms are likely secondary to alternate pathology. Recommend he sees neurology vs. spine surgeon because description concerning for neurologic source of pain.\par \par Return to office in 6 months

## 2022-10-25 NOTE — PHYSICAL EXAM
[JVD] : no jugular venous distention  [Normal Breath Sounds] : Normal breath sounds [Normal Heart Sounds] : normal heart sounds [Normal Rate and Rhythm] : normal rate and rhythm [2+] : left 2+ [de-identified] : well appearing ambulates unassisted [FreeTextEntry1] : LUE: forearm loop graft with palpable thrill no edema, palpable radial pulse and 5/5 motor\par RUE +2 radial pulse \par \par RLE & LLE: feet are warm and well perfused <2s cap refill; strong DP & PT pulses; no edema

## 2022-10-25 NOTE — HISTORY OF PRESENT ILLNESS
[FreeTextEntry1] : 61 year old man with history of Type A aortic dissection s/p repair in 2010 and recent CABG x2, Mitral valve and aortic valve replacement in November 2020. Patient has been on HD via a right IJ tunneled HD catheter since November 2020. Patient referred here for long term AV access.\par Patient is right hand dominant. he had a previous stroke and has some residual left arm weakness\par Patient s/p left forearm loop AV graft on 1/2/21 \par \par 06/2022: Patient has been off hemodialysis since October 2021\par Most recent serum Cr in 04/22 was 1.6.\par Recent renal artery duplex US reveals bilateral renal artery stenosis (>70% in right renal artery).\par His blood pressure is reasonable well controlled on 2 agents.  [de-identified] : Since last visit patient states he's been doing well in terms of blood pressure control and kidney disease. He continues to take all his medications as prescribed and following up with all appropriate providers. He denies headaches, chest pain or back pain.\par \par He is here because of new symptoms noted by cardiologist (Brianne To). He states over last few months he is having worsening pain and numbness (or shocklike sensation). In his b/l lower extremities. He notes it occurs at night when he is sleeping and he gets up to walk it off. He is not sure if walking or standing actually help or if it just fades with time. He states his left is usually worse than right but he had at least one notable episode of numbness and discomfort in right leg too. He also has similar symptoms in his b/l hands again unclear what exacerbates the symptoms. Denies pain at a particular walking distance suggestive of claudication; denies rest pain; denies non healing wounds.

## 2022-11-06 NOTE — OCCUPATIONAL THERAPY INITIAL EVALUATION ADULT - REHAB POTENTIAL, OT EVAL
Dr. Nagel notified via secure chat of BP of 85/51, patient asymptomatic. MD ordered to re-check BP at 1200 and if SBP >95 to give PO lasix at that time.     1224: updated Dr. Nagel via secure chat that PO lasix still held at this time related to most recent BP of 88/59.    good, to achieve stated therapy goals

## 2022-11-18 ENCOUNTER — RESULT REVIEW (OUTPATIENT)
Age: 62
End: 2022-11-18

## 2022-11-22 ENCOUNTER — APPOINTMENT (OUTPATIENT)
Dept: GASTROENTEROLOGY | Facility: CLINIC | Age: 62
End: 2022-11-22

## 2022-12-01 ENCOUNTER — APPOINTMENT (OUTPATIENT)
Dept: CARDIOLOGY | Facility: CLINIC | Age: 62
End: 2022-12-01

## 2022-12-01 ENCOUNTER — NON-APPOINTMENT (OUTPATIENT)
Age: 62
End: 2022-12-01

## 2022-12-01 VITALS
SYSTOLIC BLOOD PRESSURE: 148 MMHG | TEMPERATURE: 97.8 F | DIASTOLIC BLOOD PRESSURE: 60 MMHG | OXYGEN SATURATION: 99 % | WEIGHT: 186 LBS | HEART RATE: 58 BPM | BODY MASS INDEX: 28.28 KG/M2

## 2022-12-01 PROCEDURE — 93000 ELECTROCARDIOGRAM COMPLETE: CPT

## 2022-12-01 PROCEDURE — 99214 OFFICE O/P EST MOD 30 MIN: CPT

## 2022-12-01 RX ORDER — AMLODIPINE BESYLATE 10 MG/1
10 TABLET ORAL
Qty: 30 | Refills: 5 | Status: DISCONTINUED | COMMUNITY
End: 2022-12-01

## 2022-12-09 ENCOUNTER — APPOINTMENT (OUTPATIENT)
Dept: NEPHROLOGY | Facility: CLINIC | Age: 62
End: 2022-12-09

## 2022-12-09 ENCOUNTER — NON-APPOINTMENT (OUTPATIENT)
Age: 62
End: 2022-12-09

## 2022-12-09 VITALS
SYSTOLIC BLOOD PRESSURE: 144 MMHG | OXYGEN SATURATION: 100 % | WEIGHT: 193 LBS | DIASTOLIC BLOOD PRESSURE: 82 MMHG | BODY MASS INDEX: 29.25 KG/M2 | HEIGHT: 68 IN | HEART RATE: 84 BPM

## 2022-12-09 PROCEDURE — 99214 OFFICE O/P EST MOD 30 MIN: CPT | Mod: 25

## 2022-12-09 PROCEDURE — 93000 ELECTROCARDIOGRAM COMPLETE: CPT

## 2022-12-09 PROCEDURE — 36415 COLL VENOUS BLD VENIPUNCTURE: CPT

## 2022-12-09 NOTE — PROGRESS NOTE ADULT - SUBJECTIVE AND OBJECTIVE BOX
Medication refill queued and pended. Prescriber please review, sign, and send if appropriate. Thank you.      JONATHAN GIBSON  ----------------------------------------  The patient was seen and evaluated for heart failure. Offers no complaints, denied dyspnea or chest pain.    Vital Signs Last 24 Hrs  T(C): 37 (07 Nov 2020 08:50), Max: 37.1 (06 Nov 2020 20:59)  T(F): 98.6 (07 Nov 2020 08:50), Max: 98.7 (06 Nov 2020 20:59)  HR: 82 (07 Nov 2020 08:50) (75 - 92)  BP: 127/67 (07 Nov 2020 08:50) (127/67 - 153/72)  BP(mean): --  RR: 18 (07 Nov 2020 08:50) (16 - 19)  SpO2: 98% (07 Nov 2020 08:50) (95% - 100%)    PHYSICAL EXAMINATION:  ----------------------------------------  General appearance: No acute distress, Awake, Alert  HEENT: Normocephalic, Atraumatic, Conjunctiva clear, EOMI  Neck: Supple, No JVD, No tenderness  Lungs: Breath sound equal bilaterally, No wheezes, No rales  Cardiovascular: S1S2, Regular rhythm  Abdomen: Soft, Nontender, Nondistended, No guarding/rebound, Positive bowel sounds  Extremities: No clubbing, No cyanosis, No calf tenderness, Trace lower extremity edema  Neuro: Strength equal bilaterally, No tremors  Psychiatric: Appropriate mood, Normal affect    LABORATORY STUDIES:  ----------------------------------------             7.4    8.05  )-----------( 209      ( 07 Nov 2020 05:49 )             23.9     11-07    143  |  109<H>  |  50.0<H>  ----------------------------<  90  3.4<L>   |  20.0<L>  |  2.82<H>    Ca    8.1<L>      07 Nov 2020 05:47    MEDICATIONS  (STANDING):  aspirin enteric coated 81 milliGRAM(s) Oral daily  atorvastatin 40 milliGRAM(s) Oral at bedtime  carvedilol 25 milliGRAM(s) Oral every 12 hours  enoxaparin Injectable 30 milliGRAM(s) SubCutaneous daily  epoetin yolanda-epbx (RETACRIT) Injectable 55818 Unit(s) SubCutaneous every 7 days  folic acid 1 milliGRAM(s) Oral daily  hydrALAZINE 75 milliGRAM(s) Oral three times a day  influenza   Vaccine 0.5 milliLiter(s) IntraMuscular once  iron sucrose IVPB 200 milliGRAM(s) IV Intermittent every 48 hours  levETIRAcetam 750 milliGRAM(s) Oral two times a day  pantoprazole    Tablet 40 milliGRAM(s) Oral before breakfast  potassium chloride    Tablet ER 40 milliEquivalent(s) Oral once  senna 2 Tablet(s) Oral at bedtime  sodium bicarbonate 650 milliGRAM(s) Oral three times a day    MEDICATIONS  (PRN):  albuterol/ipratropium for Nebulization 3 milliLiter(s) Nebulizer every 6 hours PRN Bronchospasm  hydrALAZINE Injectable 10 milliGRAM(s) IV Push every 6 hours PRN for sbp above 160 mmhg  nitroglycerin     SubLingual 0.4 milliGRAM(s) SubLingual every 5 minutes PRN Chest Pain      ASSESSMENT / PLAN:  ----------------------------------------  59 y/o M with PMH of CAD s/p stent in LAD, leaky aortic valve, uncontrolled bp, thoracic aortic dissection requiring emergent surgery, CVA with left sided sensory loss, colostomy for bowel ischemia s/p reversal, psoriasis on Humira injections presents from home with shortness of breath.     Acute on chronic diastolic heart failure - On carvedilol and hydralazine. Dyspnea improved. Aortic regurgitation and mitral regurgitation noted on echocardiogram. Cardiothoracic Surgery consultation noted, work up in progress for possible surgical intervention.    Anemia - The patient is noted to have a history of coronary artery disease. For transfusion of packed red blood cells. On iron sucrose and erythropoietin. Gastroenterology consultation noted, planned for upper endoscopy and flexible sigmoidoscopy.    Coronary artery disease - Planned for cardiac catheterization. On aspirin, atorvastatin, and carvedilol.    Acute kidney injury - Hemodialysis as per Nephrology. Renal ultrasound was without hydronephrosis. Potassium supplemented for hypokalemia.    History of stroke - On aspirin, atorvastatin, and levetiracetam.

## 2022-12-09 NOTE — ASSESSMENT
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 States off dialysis now. Has no  lower extremity edema. Making urine. \par \par Will resume torsemide 20 mg every other day, PRN \par \par Hold ARB, \par \par Benign essential hypertension (401.1) (I10)\par Cardiomyopathy (425.4) (I42.9)\par CKD (chronic kidney disease) (585.9) (N18.9)\par Carotid artery disease (447.9) (I77.9)\par HLD (hyperlipidemia) (272.4) (E78.5)\par History of aortic aneurysm repair (V45.89) (Z98.890,Z86.79)\par Atrial fibrillation (427.31) (I48.91)\par \par Plan\par Benign essential hypertension \par Renew: amLODIPine Besylate 10 MG Oral Tablet; TAKE 1 TABLET DAILY\par \par \par Plan\par 1. HTN- BP mildly elevated, ? compliance. Reviewed med list. Continue current meds.\par 2. Afib- s/p ablation 9/2021, on Eliquis and BB, no AF on event monitor, f/u with EP \par 3. CAD- s/p CABG, no CP/SOB, on ASA , statin, and BB\par 4. AS/MR- s/p AVR and MVR, mild MR on recent TTE, on ASA \par 5. CM- appears euvolemic, LVEF 45-50% on TTE 9/2022. On torsemide 20 mg po qod. \par 6. HLD- LDL 46, on lipitor 40 mg daily\par 7. hx aortic dissection repair- chronic dissection, will follow up with CTS- Dr. Butler\par 8. renal artery stenosis/carotid artery disease- continue to follow with vascular. \par 9. CKD- no longer on dialysis, recent Cr 1.6 mg., \par 10. Follow up in 6 months with Dr. Frankel\par \par F.U Q 4 mo.,

## 2022-12-09 NOTE — PHYSICAL EXAM
[General Appearance - Alert] : alert [General Appearance - In No Acute Distress] : in no acute distress [Sclera] : the sclera and conjunctiva were normal [PERRL With Normal Accommodation] : pupils were equal in size, round, and reactive to light [Extraocular Movements] : extraocular movements were intact [Outer Ear] : the ears and nose were normal in appearance [Oropharynx] : the oropharynx was normal [Neck Appearance] : the appearance of the neck was normal [Neck Cervical Mass (___cm)] : no neck mass was observed [Jugular Venous Distention Increased] : there was no jugular-venous distention [Thyroid Diffuse Enlargement] : the thyroid was not enlarged [Thyroid Nodule] : there were no palpable thyroid nodules [Decreased Breath Sounds] : decreased breath sounds [Heart Rate And Rhythm] : heart rate was normal and rhythm regular [Heart Sounds] : normal S1 and S2 [Heart Sounds Gallop] : no gallops [Heart Sounds Pericardial Friction Rub] : no pericardial rub [Systolic grade ___/6] : A grade [unfilled]/6 systolic murmur was heard. [Full Pulse] : the pedal pulses are present [Edema] : there was no peripheral edema [Bowel Sounds] : normal bowel sounds [Abdomen Soft] : soft [Abdomen Tenderness] : non-tender [Abdomen Mass (___ Cm)] : no abdominal mass palpated [FreeTextEntry1] : Surgical Scar,  [Cervical Lymph Nodes Enlarged Posterior Bilaterally] : posterior cervical [Cervical Lymph Nodes Enlarged Anterior Bilaterally] : anterior cervical [Supraclavicular Lymph Nodes Enlarged Bilaterally] : supraclavicular [Axillary Lymph Nodes Enlarged Bilaterally] : axillary [Femoral Lymph Nodes Enlarged Bilaterally] : femoral [Inguinal Lymph Nodes Enlarged Bilaterally] : inguinal [No CVA Tenderness] : no ~M costovertebral angle tenderness [No Spinal Tenderness] : no spinal tenderness [Abnormal Walk] : normal gait [Nail Clubbing] : no clubbing  or cyanosis of the fingernails [Musculoskeletal - Swelling] : no joint swelling seen [Motor Tone] : muscle strength and tone were normal [Skin Color & Pigmentation] : normal skin color and pigmentation [Skin Turgor] : normal skin turgor [] : no rash [Deep Tendon Reflexes (DTR)] : deep tendon reflexes were 2+ and symmetric [Sensation] : the sensory exam was normal to light touch and pinprick [No Focal Deficits] : no focal deficits [Oriented To Time, Place, And Person] : oriented to person, place, and time [Impaired Insight] : insight and judgment were intact [Affect] : the affect was normal

## 2022-12-12 LAB
ALBUMIN SERPL ELPH-MCNC: 4.8 G/DL
ANION GAP SERPL CALC-SCNC: 14 MMOL/L
APPEARANCE: CLEAR
BACTERIA: NEGATIVE
BASOPHILS # BLD AUTO: 0.07 K/UL
BASOPHILS NFR BLD AUTO: 0.9 %
BILIRUBIN URINE: NEGATIVE
BLOOD URINE: ABNORMAL
BUN SERPL-MCNC: 36 MG/DL
CALCIUM SERPL-MCNC: 9.5 MG/DL
CALCIUM SERPL-MCNC: 9.5 MG/DL
CHLORIDE SERPL-SCNC: 97 MMOL/L
CHOLEST SERPL-MCNC: 115 MG/DL
CO2 SERPL-SCNC: 27 MMOL/L
COLOR: NORMAL
CREAT SERPL-MCNC: 1.73 MG/DL
CREAT SPEC-SCNC: 86 MG/DL
CREAT SPEC-SCNC: 86 MG/DL
CREAT/PROT UR: 1.5 RATIO
EGFR: 44 ML/MIN/1.73M2
EOSINOPHIL # BLD AUTO: 0.47 K/UL
EOSINOPHIL NFR BLD AUTO: 6.3 %
GLUCOSE QUALITATIVE U: NEGATIVE
GLUCOSE SERPL-MCNC: 99 MG/DL
HCT VFR BLD CALC: 39.3 %
HDLC SERPL-MCNC: 60 MG/DL
HGB BLD-MCNC: 12 G/DL
HYALINE CASTS: 0 /LPF
IMM GRANULOCYTES NFR BLD AUTO: 0.1 %
KETONES URINE: NEGATIVE
LDLC SERPL CALC-MCNC: 44 MG/DL
LEUKOCYTE ESTERASE URINE: NEGATIVE
LYMPHOCYTES # BLD AUTO: 1.61 K/UL
LYMPHOCYTES NFR BLD AUTO: 21.5 %
MAN DIFF?: NORMAL
MCHC RBC-ENTMCNC: 25.8 PG
MCHC RBC-ENTMCNC: 30.5 GM/DL
MCV RBC AUTO: 84.5 FL
MICROALBUMIN 24H UR DL<=1MG/L-MCNC: 79.3 MG/DL
MICROALBUMIN/CREAT 24H UR-RTO: 922 MG/G
MICROSCOPIC-UA: NORMAL
MONOCYTES # BLD AUTO: 0.68 K/UL
MONOCYTES NFR BLD AUTO: 9.1 %
NEUTROPHILS # BLD AUTO: 4.65 K/UL
NEUTROPHILS NFR BLD AUTO: 62.1 %
NITRITE URINE: NEGATIVE
NONHDLC SERPL-MCNC: 55 MG/DL
NT-PROBNP SERPL-MCNC: 698 PG/ML
PARATHYROID HORMONE INTACT: 72 PG/ML
PH URINE: 6.5
PHOSPHATE SERPL-MCNC: 3.4 MG/DL
PLATELET # BLD AUTO: 214 K/UL
POTASSIUM SERPL-SCNC: 3.9 MMOL/L
PROT UR-MCNC: 130 MG/DL
PROTEIN URINE: ABNORMAL
RBC # BLD: 4.65 M/UL
RBC # FLD: 15.4 %
RED BLOOD CELLS URINE: 6 /HPF
SODIUM SERPL-SCNC: 139 MMOL/L
SPECIFIC GRAVITY URINE: 1.01
SQUAMOUS EPITHELIAL CELLS: 0 /HPF
TRIGL SERPL-MCNC: 57 MG/DL
URATE SERPL-MCNC: 10.3 MG/DL
UROBILINOGEN URINE: NORMAL
WBC # FLD AUTO: 7.49 K/UL
WHITE BLOOD CELLS URINE: 0 /HPF

## 2022-12-12 RX ORDER — TORSEMIDE 20 MG/1
20 TABLET ORAL EVERY OTHER DAY
Qty: 45 | Refills: 1 | Status: DISCONTINUED | COMMUNITY
Start: 2022-11-22 | End: 2022-12-12

## 2022-12-13 ENCOUNTER — APPOINTMENT (OUTPATIENT)
Dept: CT IMAGING | Facility: CLINIC | Age: 62
End: 2022-12-13

## 2022-12-13 ENCOUNTER — OUTPATIENT (OUTPATIENT)
Dept: OUTPATIENT SERVICES | Facility: HOSPITAL | Age: 62
LOS: 1 days | End: 2022-12-13
Payer: MEDICARE

## 2022-12-13 ENCOUNTER — NON-APPOINTMENT (OUTPATIENT)
Age: 62
End: 2022-12-13

## 2022-12-13 DIAGNOSIS — Z95.2 PRESENCE OF PROSTHETIC HEART VALVE: Chronic | ICD-10-CM

## 2022-12-13 DIAGNOSIS — Z86.79 PERSONAL HISTORY OF OTHER DISEASES OF THE CIRCULATORY SYSTEM: Chronic | ICD-10-CM

## 2022-12-13 DIAGNOSIS — Z95.1 PRESENCE OF AORTOCORONARY BYPASS GRAFT: Chronic | ICD-10-CM

## 2022-12-13 DIAGNOSIS — Z90.49 ACQUIRED ABSENCE OF OTHER SPECIFIED PARTS OF DIGESTIVE TRACT: Chronic | ICD-10-CM

## 2022-12-13 DIAGNOSIS — Z98.890 OTHER SPECIFIED POSTPROCEDURAL STATES: ICD-10-CM

## 2022-12-13 DIAGNOSIS — I77.0 ARTERIOVENOUS FISTULA, ACQUIRED: Chronic | ICD-10-CM

## 2022-12-13 PROCEDURE — 71275 CT ANGIOGRAPHY CHEST: CPT

## 2022-12-13 PROCEDURE — 71275 CT ANGIOGRAPHY CHEST: CPT | Mod: 26,MH

## 2022-12-13 PROCEDURE — 74174 CTA ABD&PLVS W/CONTRAST: CPT | Mod: 26,MH

## 2022-12-13 PROCEDURE — 74174 CTA ABD&PLVS W/CONTRAST: CPT

## 2022-12-21 ENCOUNTER — APPOINTMENT (OUTPATIENT)
Dept: CARDIOTHORACIC SURGERY | Facility: CLINIC | Age: 62
End: 2022-12-21
Payer: MEDICARE

## 2022-12-30 ENCOUNTER — APPOINTMENT (OUTPATIENT)
Dept: CARDIOTHORACIC SURGERY | Facility: CLINIC | Age: 62
End: 2022-12-30
Payer: MEDICARE

## 2022-12-30 VITALS
BODY MASS INDEX: 29.25 KG/M2 | TEMPERATURE: 97.8 F | SYSTOLIC BLOOD PRESSURE: 105 MMHG | HEART RATE: 66 BPM | DIASTOLIC BLOOD PRESSURE: 45 MMHG | HEIGHT: 68 IN | WEIGHT: 193 LBS | RESPIRATION RATE: 15 BRPM | OXYGEN SATURATION: 95 %

## 2022-12-30 PROCEDURE — 99214 OFFICE O/P EST MOD 30 MIN: CPT

## 2022-12-30 NOTE — ASSESSMENT
[FreeTextEntry1] : I have personally viewed the patient's CT scan from December 13, 2022.  The aortic root is unchanged at 4.7 to 4.8 cm.  I find it extremely unlikely that the short segment between the prosthetic aortic valve and the sinotubular junction anastomosis will dilate.  Descending thoracic aorta remains dissected, but fortunately there is absolutely no evidence of aneurysmal dilatation.  Following dissection annual CT scans are required.  This was explained to the patient in detail.\par \par \par PLAN: \par Follow up CT scan of the chest in 1 year for surveillance aneurysm \par Return to the aneurysm clinic in 1 year following CT scan\par \par I would like to thank you for referring this patient to my attention and for allowing me to participate in his care.  If there are any further questions, or I can be of any further assistance, please do not hesitate contacting me at any time.\par \par \par \par \par "I, Ciera Snell, am scribing for and the presence of Dr. Butler the following sections HISTORY OF PRESENT ILLNESS, PAST MEDICAL/FAMILY/SOCIAL HISTORY; REVIEW OF SYSTEMS; VITAL SIGNS; PHYSICAL EXAM; DISPOSITION."\par \par "I personally performed the services described in the documentation, review the documentation recorded by the scribe in my presence and it accurately and completely records my words and actions."\par

## 2022-12-30 NOTE — DATA REVIEWED
[FreeTextEntry1] : CT ANGIO ABD PELV (W)AW IC  - ORDERED BY: CLINT ELMORE\par \par EXAM: [id number] - CT ANGIO CHEST AORTA WAWI  - ORDERED BY: CLINT ELMORE\par \par PROCEDURE DATE:  12/13/2022\par \par INTERPRETATION:  Clinical information: Aortic aneurysm repair\par \par TECHNIQUE: Initial noncontrast volumetric acquisition of the chest followed by CT angiogram of the chest abdomen pelvis after the intravenous administration of 90 cc administered of Omnipaque 350, 10 cc discarded. Sagittal and coronal multiplanar reformations and three-dimensional maximum intensity projection images were generated.\par \par COMPARISON: 6/25/2021\par \par FINDINGS:\par \par Aorta angiogram: Patient is again status post ascending aortic repair. Persistent dissection flap in the transverse aorta extending into the brachiocephalic artery and proximal left subclavian artery, into the descending aorta to the proximal right external iliac artery and very proximal left common iliac artery. There is also short segment extension into the proximal left renal artery. There is again thrombosis within the false lumen in the proximal left common iliac artery.\par \par Aortic measurements as follows:\par *  Aortic root: 4.8 cm, unchanged\par *  Proximal anastomosis/sinotubular junction: 3.2 cm, unchanged\par *  Distal anastomosis: 2.9 cm, unchanged\par *  Distal arch proximal to left subclavian artery: 3.6 cm\par *  Proximal descending aorta 4.0 cm, unchanged\par *  Descending aorta at left inferior pulmonary vein 3.5 cm, unchanged\par *  Aorta at diaphragmatic hiatus: 3.3 cm, unchanged\par *  Abdominal aorta at SMA: 2.8 cm\par *  Infrarenal aorta: 2.6 cm, unchanged\par \par Chest:\par \par The heart is enlarged. Status post aortic valve and mitral valve replacement. No pericardial effusion.\par \par Linear atelectasis in the left lower lobe.. No endobronchial nodule. No pleural effusion\par \par Median sternotomy has been performed\par \par Abdomen and pelvis:\par \par The liver, spleen, pancreas and adrenal glands are unremarkable. There is cholelithiasis.\par \par The kidneys enhance symmetrically. No hydronephrosis.\par \par No bowel obstruction. Partial colectomy with ileocolic anastomosis. No ascites. No lymphadenopathy.\par \par Urinary bladder is underdistended. Mild prostate enlargement.\par \par IMPRESSION:\par \par Stable aortic measurements status post ascending aortic repair and aortic and mitral valve replacements. Persistent dissection flap extending to the right external iliac and proximal left common iliac arteries.\par \par --- End of Report ---\par \par ROWENA FINNEY M.D., Attending Radiologist\par \par \par \par \par \par \par \par \par \par \par \par \par TRANSTHORACIC ECHOCARDIOGRAM REPORT\par Patient Name:   JONATHAN GIBSON\par Date of Exam:        9/2/2022 12:23:18 PM\par \par Summary:\par 1. Technically adequate study.\par 2. Left ventricular ejection fraction, by visual estimation, is 45 to 50%.\par 3. Mildly decreased global left ventricular systolic function.\par 4. Basal anteroseptal segment and basal inferoseptal segment are abnormal as described above.\par 5. The left ventricular diastolic function could not be assessed in this study.\par 6. There is mild concentric left ventricular hypertrophy.\par 7. Normal right ventricular size and function.\par 8. Severely enlarged left atrium.\par 9. A bioprosthetic valve is present in the mitral position. Mean transmitral gradient of 6 mmHg at Hr of 65 BPM.\par 10. Mild mitral valve regurgitation.\par 11. Bioprosthesis in the aortic position. Normal functioning aortic valve prosthesis.\par 12. AT = 123ms. ET = 394ms. AT/ET = 0.31.\par 13. Dilation of the aortic valve at the sinuses of valsalva measuring 4.3 cm.\par 14. There is no evidence of pericardial effusion.\par 15. Compared to prior echo done on 2/5/21, the LA appears more dilated.\par \par [id number] Ramon Hargrove MD, Electronically signed on 9/2/2022 at 3:02:21 PM\par \par ***\par \par *** Final ***

## 2022-12-30 NOTE — HISTORY OF PRESENT ILLNESS
[FreeTextEntry1] : Hilton Cummins is a 62 year old male who presents for a follow up appointment. He is s/p Type A aortic dissection then repeat sternotomy in 11/2020 with CABG x2, systolic CHF, Mitral valve and aortic valve replacement. \par \par Past medical history includes ESRD previously on HD with a left lower extremely, A-fib s/p cardioversion x2,  CAD, HTN, AS/MR, CM, HLD, renal artery stenosis/carotid artery disease.\par \par CVA with left sided weakness, \par \par Today he reports feeling well and offers no complaints.  The patient is 2 years s/p re-operative sternotomy, AVR and MVR following repair Type A dissection.  Post OP he was lost to follow up.  He now present s/p CTA performed on 12/13/2022 and re-establish care per Dr. Frankel's recommendation.\par \par The patient's past medical history, past surgical history, family history, social history, allergies, medications, and multisystem review of systems were individually reviewed with the patient.  There are no pertinent additions or subtractions.  The patient was personally seen and examined.\par \par \par Cardiology: Jostin

## 2022-12-30 NOTE — CONSULT LETTER
[FreeTextEntry2] : Felice Frankel MD [FreeTextEntry3] : Javy Butler MD\par Chief of Cardiovascular and Thoracic Surgery\par System Director of Endovascular and Cardiovascular Surgery\par , Cardiovascular and Thoracic Surgery\par [facility]\par [facility]\par [facility]\par [address] [place] [address]\[place] [address]\par Tel. [phone]\par Fax [phone]

## 2022-12-30 NOTE — PHYSICAL EXAM
[Sclera] : the sclera and conjunctiva were normal [Extraocular Movements] : extraocular movements were intact [Neck Appearance] : the appearance of the neck was normal [Neck Cervical Mass (___cm)] : no neck mass was observed [] : no respiratory distress [Exaggerated Use Of Accessory Muscles For Inspiration] : no accessory muscle use [Apical Impulse] : the apical impulse was normal [Heart Sounds] : normal S1 and S2 [Examination Of The Chest] : the chest was normal in appearance [2+] : left 2+ [Breast Appearance] : normal in appearance [Breast Palpation Mass] : no palpable masses [Bowel Sounds] : normal bowel sounds [Abdomen Tenderness] : non-tender [Cervical Lymph Nodes Enlarged Posterior Bilaterally] : posterior cervical [Supraclavicular Lymph Nodes Enlarged Bilaterally] : supraclavicular [No CVA Tenderness] : no ~M costovertebral angle tenderness [Abnormal Walk] : normal gait [Skin Color & Pigmentation] : normal skin color and pigmentation [No Focal Deficits] : no focal deficits [Oriented To Time, Place, And Person] : oriented to person, place, and time [Nail Clubbing] : no clubbing  or cyanosis of the fingernails [Musculoskeletal - Swelling] : no joint swelling seen [Motor Tone] : muscle strength and tone were normal [Deep Tendon Reflexes (DTR)] : deep tendon reflexes were 2+ and symmetric [Sensation] : the sensory exam was normal to light touch and pinprick [Varicose Veins Of The Right Leg] : the patient has no varicose veins of the right leg [Varicose Veins Of The Left Leg] : the patient has no varicose veins of the left leg [FreeTextEntry1] : defer

## 2022-12-31 ENCOUNTER — APPOINTMENT (OUTPATIENT)
Dept: ULTRASOUND IMAGING | Facility: CLINIC | Age: 62
End: 2022-12-31
Payer: MEDICARE

## 2022-12-31 ENCOUNTER — OUTPATIENT (OUTPATIENT)
Dept: OUTPATIENT SERVICES | Facility: HOSPITAL | Age: 62
LOS: 1 days | End: 2022-12-31
Payer: MEDICARE

## 2022-12-31 DIAGNOSIS — I48.91 UNSPECIFIED ATRIAL FIBRILLATION: ICD-10-CM

## 2022-12-31 DIAGNOSIS — Z95.1 PRESENCE OF AORTOCORONARY BYPASS GRAFT: Chronic | ICD-10-CM

## 2022-12-31 DIAGNOSIS — Z90.49 ACQUIRED ABSENCE OF OTHER SPECIFIED PARTS OF DIGESTIVE TRACT: Chronic | ICD-10-CM

## 2022-12-31 DIAGNOSIS — I77.0 ARTERIOVENOUS FISTULA, ACQUIRED: Chronic | ICD-10-CM

## 2022-12-31 DIAGNOSIS — Z95.2 PRESENCE OF PROSTHETIC HEART VALVE: Chronic | ICD-10-CM

## 2022-12-31 DIAGNOSIS — Z86.79 PERSONAL HISTORY OF OTHER DISEASES OF THE CIRCULATORY SYSTEM: Chronic | ICD-10-CM

## 2022-12-31 PROCEDURE — 76770 US EXAM ABDO BACK WALL COMP: CPT

## 2022-12-31 PROCEDURE — 76770 US EXAM ABDO BACK WALL COMP: CPT | Mod: 26

## 2023-01-25 ENCOUNTER — APPOINTMENT (OUTPATIENT)
Dept: GASTROENTEROLOGY | Facility: CLINIC | Age: 63
End: 2023-01-25

## 2023-01-26 NOTE — PROGRESS NOTE ADULT - SUBJECTIVE AND OBJECTIVE BOX
Patient is a 60y old  Male who presents with a chief complaint of Impairments: AVR  Impairment Codes: 09 Cardiac (07 Dec 2020 10:56)      Patient seen and examined at bedside. No overnight events. Participating in therapy. Admits to difficulty falling asleep.    ALLERGIES:  penicillin (Unknown)    MEDICATIONS  (STANDING):  acetaminophen   Tablet .. 975 milliGRAM(s) Oral <User Schedule>  aMIOdarone    Tablet 200 milliGRAM(s) Oral daily  ascorbic acid 500 milliGRAM(s) Oral daily  aspirin enteric coated 81 milliGRAM(s) Oral daily  atorvastatin 40 milliGRAM(s) Oral at bedtime  epoetin yolanda-epbx (RETACRIT) Injectable 10548 Unit(s) IV Push <User Schedule>  folic acid 1 milliGRAM(s) Oral daily  gabapentin 100 milliGRAM(s) Oral at bedtime  influenza   Vaccine 0.5 milliLiter(s) IntraMuscular once  iron sucrose IVPB 100 milliGRAM(s) IV Intermittent every 7 days  levETIRAcetam 750 milliGRAM(s) Oral two times a day  lidocaine   Patch 1 Patch Transdermal daily  melatonin 6 milliGRAM(s) Oral at bedtime  metoprolol tartrate 25 milliGRAM(s) Oral every 12 hours  multivitamin 1 Tablet(s) Oral daily  nystatin Cream 1 Application(s) Topical two times a day  pantoprazole    Tablet 40 milliGRAM(s) Oral two times a day  QUEtiapine 25 milliGRAM(s) Oral at bedtime    MEDICATIONS  (PRN):  acetaminophen   Tablet .. 650 milliGRAM(s) Oral every 6 hours PRN Temp greater or equal to 38C (100.4F), Mild Pain (1 - 3)  ALBUTerol    90 MICROgram(s) HFA Inhaler 2 Puff(s) Inhalation every 6 hours PRN Shortness of Breath and/or Wheezing  benzocaine 15 mG/menthol 3.6 mG (Sugar-Free) Lozenge 1 Lozenge Oral two times a day PRN Sore Throat  guaiFENesin   Syrup  (Sugar-Free) 200 milliGRAM(s) Oral every 6 hours PRN Cough    Vital Signs Last 24 Hrs  T(F): 97.8 (07 Dec 2020 09:00), Max: 97.8 (07 Dec 2020 09:00)  HR: 96 (07 Dec 2020 09:00) (80 - 96)  BP: 133/77 (07 Dec 2020 09:00) (130/80 - 153/88)  RR: 15 (07 Dec 2020 09:00) (15 - 18)  SpO2: 98% (07 Dec 2020 09:00) (96% - 98%)  I&O's Summary        PHYSICAL EXAM:  General: NAD, A/O x 3  ENT: MMM, no scleral icterus  Neck: Supple, No JVD  Lungs: Clear to auscultation bilaterally, no wheezes, rales, rhonchi  Cardio: RRR, S1/S2, No murmurs  Abdomen: Soft, Nontender, Nondistended; Bowel sounds present  Extremities: No calf tenderness, No pitting edema  Skin: LLE incision c/d/i with staples, Right chest permacath    LABS:                        9.0    9.04  )-----------( 200      ( 05 Dec 2020 08:05 )             29.0                PT/INR - ( 07 Dec 2020 05:50 )   PT: 48.6 sec;   INR: 4.31 ratio                  11-15 Chol -- LDL -- HDL -- Trig 105 mg/dL                        RADIOLOGY & ADDITIONAL TESTS:    Care Discussed with Consultants/Other Providers: Dr. Calvo (physiatry)   Patient is a 60y old  Male who presents with a chief complaint of Impairments: AVR  Impairment Codes: 09 Cardiac (07 Dec 2020 10:56)      Patient seen and examined at bedside. No overnight events. Participating in therapy. Admits to difficulty falling asleep.    ALLERGIES:  penicillin (Unknown)    MEDICATIONS  (STANDING):  acetaminophen   Tablet .. 975 milliGRAM(s) Oral <User Schedule>  aMIOdarone    Tablet 200 milliGRAM(s) Oral daily  ascorbic acid 500 milliGRAM(s) Oral daily  aspirin enteric coated 81 milliGRAM(s) Oral daily  atorvastatin 40 milliGRAM(s) Oral at bedtime  epoetin yolanda-epbx (RETACRIT) Injectable 30726 Unit(s) IV Push <User Schedule>  folic acid 1 milliGRAM(s) Oral daily  gabapentin 100 milliGRAM(s) Oral at bedtime  influenza   Vaccine 0.5 milliLiter(s) IntraMuscular once  iron sucrose IVPB 100 milliGRAM(s) IV Intermittent every 7 days  levETIRAcetam 750 milliGRAM(s) Oral two times a day  lidocaine   Patch 1 Patch Transdermal daily  melatonin 6 milliGRAM(s) Oral at bedtime  metoprolol tartrate 25 milliGRAM(s) Oral every 12 hours  multivitamin 1 Tablet(s) Oral daily  nystatin Cream 1 Application(s) Topical two times a day  pantoprazole    Tablet 40 milliGRAM(s) Oral two times a day  QUEtiapine 25 milliGRAM(s) Oral at bedtime    MEDICATIONS  (PRN):  acetaminophen   Tablet .. 650 milliGRAM(s) Oral every 6 hours PRN Temp greater or equal to 38C (100.4F), Mild Pain (1 - 3)  ALBUTerol    90 MICROgram(s) HFA Inhaler 2 Puff(s) Inhalation every 6 hours PRN Shortness of Breath and/or Wheezing  benzocaine 15 mG/menthol 3.6 mG (Sugar-Free) Lozenge 1 Lozenge Oral two times a day PRN Sore Throat  guaiFENesin   Syrup  (Sugar-Free) 200 milliGRAM(s) Oral every 6 hours PRN Cough    Vital Signs Last 24 Hrs  T(F): 97.8 (07 Dec 2020 09:00), Max: 97.8 (07 Dec 2020 09:00)  HR: 96 (07 Dec 2020 09:00) (80 - 96)  BP: 133/77 (07 Dec 2020 09:00) (130/80 - 153/88)  RR: 15 (07 Dec 2020 09:00) (15 - 18)  SpO2: 98% (07 Dec 2020 09:00) (96% - 98%)  I&O's Summary        PHYSICAL EXAM:  General: NAD, A/O x 3  ENT: MMM, no scleral icterus  Neck: Supple, No JVD  Lungs: Clear to auscultation bilaterally, no wheezes, rales, rhonchi  Cardio: RRR, S1/S2, No murmurs  Abdomen: Soft, Nontender, Nondistended; Bowel sounds present  Extremities: No calf tenderness, No pitting edema  Skin: LLE incision c/d/i with staples, Right chest permacath    LABS:                        9.0    9.04  )-----------( 200      ( 05 Dec 2020 08:05 )             29.0                PT/INR - ( 07 Dec 2020 05:50 )   PT: 48.6 sec;   INR: 4.31 ratio                  11-15 Chol -- LDL -- HDL -- Trig 105 mg/dL                        RADIOLOGY & ADDITIONAL TESTS:    Care Discussed with Consultants/Other Providers: Dr. Palafox (physiatry)   Stage 6: Additional Anesthesia Type: 1% lidocaine with 1:100,000 epinephrine and a 1:10 solution of 8.4% sodium bicarbonate

## 2023-03-31 NOTE — ED PROCEDURE NOTE - CPROC ED INFORMED CONSENT1
Benefits, risks, and possible complications of procedure explained to patient/caregiver who verbalized understanding and gave verbal consent.
right side facial droop per pt/DIZZINESS

## 2023-04-04 ENCOUNTER — APPOINTMENT (OUTPATIENT)
Dept: GASTROENTEROLOGY | Facility: CLINIC | Age: 63
End: 2023-04-04

## 2023-04-13 ENCOUNTER — APPOINTMENT (OUTPATIENT)
Dept: VASCULAR SURGERY | Facility: CLINIC | Age: 63
End: 2023-04-13

## 2023-04-14 ENCOUNTER — APPOINTMENT (OUTPATIENT)
Dept: NEPHROLOGY | Facility: CLINIC | Age: 63
End: 2023-04-14

## 2023-04-24 NOTE — ED ADULT TRIAGE NOTE - CHIEF COMPLAINT QUOTE
Patient A&Ox4 complaining of left AV fistula being blocked x4 days, was seen in this ED last week for it. Stated has RCW cath in place for dialysis. Stated MD told him to come back after the weekend. Mirvaso Pregnancy And Lactation Text: This medication has not been assigned a Pregnancy Risk Category. It is unknown if the medication is excreted in breast milk.

## 2023-05-22 ENCOUNTER — INPATIENT (INPATIENT)
Facility: HOSPITAL | Age: 63
LOS: 5 days | Discharge: HOME | DRG: 82 | End: 2023-05-28
Attending: HOSPITALIST | Admitting: STUDENT IN AN ORGANIZED HEALTH CARE EDUCATION/TRAINING PROGRAM
Payer: MEDICARE

## 2023-05-22 VITALS — WEIGHT: 166.45 LBS | HEART RATE: 90 BPM | OXYGEN SATURATION: 100 %

## 2023-05-22 DIAGNOSIS — Z95.2 PRESENCE OF PROSTHETIC HEART VALVE: Chronic | ICD-10-CM

## 2023-05-22 DIAGNOSIS — Z95.1 PRESENCE OF AORTOCORONARY BYPASS GRAFT: Chronic | ICD-10-CM

## 2023-05-22 DIAGNOSIS — Z90.49 ACQUIRED ABSENCE OF OTHER SPECIFIED PARTS OF DIGESTIVE TRACT: Chronic | ICD-10-CM

## 2023-05-22 DIAGNOSIS — Z86.79 PERSONAL HISTORY OF OTHER DISEASES OF THE CIRCULATORY SYSTEM: Chronic | ICD-10-CM

## 2023-05-22 DIAGNOSIS — S06.5X9A TRAUMATIC SUBDURAL HEMORRHAGE WITH LOSS OF CONSCIOUSNESS OF UNSPECIFIED DURATION, INITIAL ENCOUNTER: ICD-10-CM

## 2023-05-22 DIAGNOSIS — I77.0 ARTERIOVENOUS FISTULA, ACQUIRED: Chronic | ICD-10-CM

## 2023-05-22 LAB
ALBUMIN SERPL ELPH-MCNC: 3.3 G/DL — SIGNIFICANT CHANGE UP (ref 3.3–5.2)
ALP SERPL-CCNC: 90 U/L — SIGNIFICANT CHANGE UP (ref 40–120)
ALT FLD-CCNC: 22 U/L — SIGNIFICANT CHANGE UP
AMPHET UR-MCNC: NEGATIVE — SIGNIFICANT CHANGE UP
ANION GAP SERPL CALC-SCNC: 14 MMOL/L — SIGNIFICANT CHANGE UP (ref 5–17)
APPEARANCE UR: CLEAR — SIGNIFICANT CHANGE UP
APTT BLD: 36.1 SEC — HIGH (ref 27.5–35.5)
AST SERPL-CCNC: 33 U/L — SIGNIFICANT CHANGE UP
BACTERIA # UR AUTO: ABNORMAL
BARBITURATES UR SCN-MCNC: NEGATIVE — SIGNIFICANT CHANGE UP
BASOPHILS # BLD AUTO: 0.03 K/UL — SIGNIFICANT CHANGE UP (ref 0–0.2)
BASOPHILS NFR BLD AUTO: 0.2 % — SIGNIFICANT CHANGE UP (ref 0–2)
BENZODIAZ UR-MCNC: POSITIVE
BILIRUB SERPL-MCNC: 0.8 MG/DL — SIGNIFICANT CHANGE UP (ref 0.4–2)
BILIRUB UR-MCNC: NEGATIVE — SIGNIFICANT CHANGE UP
BLD GP AB SCN SERPL QL: SIGNIFICANT CHANGE UP
BUN SERPL-MCNC: 28.6 MG/DL — HIGH (ref 8–20)
CALCIUM SERPL-MCNC: 8.1 MG/DL — LOW (ref 8.4–10.5)
CHLORIDE SERPL-SCNC: 99 MMOL/L — SIGNIFICANT CHANGE UP (ref 96–108)
CO2 SERPL-SCNC: 21 MMOL/L — LOW (ref 22–29)
COCAINE METAB.OTHER UR-MCNC: NEGATIVE — SIGNIFICANT CHANGE UP
COLOR SPEC: YELLOW — SIGNIFICANT CHANGE UP
CREAT SERPL-MCNC: 1.81 MG/DL — HIGH (ref 0.5–1.3)
DIFF PNL FLD: ABNORMAL
EGFR: 42 ML/MIN/1.73M2 — LOW
EOSINOPHIL # BLD AUTO: 0 K/UL — SIGNIFICANT CHANGE UP (ref 0–0.5)
EOSINOPHIL NFR BLD AUTO: 0 % — SIGNIFICANT CHANGE UP (ref 0–6)
EPI CELLS # UR: SIGNIFICANT CHANGE UP
GLUCOSE SERPL-MCNC: 95 MG/DL — SIGNIFICANT CHANGE UP (ref 70–99)
GLUCOSE UR QL: NEGATIVE MG/DL — SIGNIFICANT CHANGE UP
HCT VFR BLD CALC: 39.6 % — SIGNIFICANT CHANGE UP (ref 39–50)
HGB BLD-MCNC: 12.9 G/DL — LOW (ref 13–17)
IMM GRANULOCYTES NFR BLD AUTO: 0.4 % — SIGNIFICANT CHANGE UP (ref 0–0.9)
INR BLD: 1.37 RATIO — HIGH (ref 0.88–1.16)
KETONES UR-MCNC: NEGATIVE — SIGNIFICANT CHANGE UP
LEUKOCYTE ESTERASE UR-ACNC: NEGATIVE — SIGNIFICANT CHANGE UP
LYMPHOCYTES # BLD AUTO: 0.53 K/UL — LOW (ref 1–3.3)
LYMPHOCYTES # BLD AUTO: 4.2 % — LOW (ref 13–44)
MCHC RBC-ENTMCNC: 27.5 PG — SIGNIFICANT CHANGE UP (ref 27–34)
MCHC RBC-ENTMCNC: 32.6 GM/DL — SIGNIFICANT CHANGE UP (ref 32–36)
MCV RBC AUTO: 84.4 FL — SIGNIFICANT CHANGE UP (ref 80–100)
METHADONE UR-MCNC: NEGATIVE — SIGNIFICANT CHANGE UP
MONOCYTES # BLD AUTO: 0.86 K/UL — SIGNIFICANT CHANGE UP (ref 0–0.9)
MONOCYTES NFR BLD AUTO: 6.8 % — SIGNIFICANT CHANGE UP (ref 2–14)
NEUTROPHILS # BLD AUTO: 11.21 K/UL — HIGH (ref 1.8–7.4)
NEUTROPHILS NFR BLD AUTO: 88.4 % — HIGH (ref 43–77)
NITRITE UR-MCNC: NEGATIVE — SIGNIFICANT CHANGE UP
OPIATES UR-MCNC: NEGATIVE — SIGNIFICANT CHANGE UP
PCP SPEC-MCNC: SIGNIFICANT CHANGE UP
PCP UR-MCNC: NEGATIVE — SIGNIFICANT CHANGE UP
PH UR: 6 — SIGNIFICANT CHANGE UP (ref 5–8)
PLATELET # BLD AUTO: 208 K/UL — SIGNIFICANT CHANGE UP (ref 150–400)
POTASSIUM SERPL-MCNC: 3.5 MMOL/L — SIGNIFICANT CHANGE UP (ref 3.5–5.3)
POTASSIUM SERPL-SCNC: 3.5 MMOL/L — SIGNIFICANT CHANGE UP (ref 3.5–5.3)
PROT SERPL-MCNC: 6.8 G/DL — SIGNIFICANT CHANGE UP (ref 6.6–8.7)
PROT UR-MCNC: 100
PROTHROM AB SERPL-ACNC: 15.9 SEC — HIGH (ref 10.5–13.4)
RAPID RVP RESULT: SIGNIFICANT CHANGE UP
RBC # BLD: 4.69 M/UL — SIGNIFICANT CHANGE UP (ref 4.2–5.8)
RBC # FLD: 16.3 % — HIGH (ref 10.3–14.5)
RBC CASTS # UR COMP ASSIST: ABNORMAL /HPF (ref 0–4)
SARS-COV-2 RNA SPEC QL NAA+PROBE: SIGNIFICANT CHANGE UP
SODIUM SERPL-SCNC: 134 MMOL/L — LOW (ref 135–145)
SP GR SPEC: 1.01 — SIGNIFICANT CHANGE UP (ref 1.01–1.02)
THC UR QL: NEGATIVE — SIGNIFICANT CHANGE UP
UROBILINOGEN FLD QL: NEGATIVE MG/DL — SIGNIFICANT CHANGE UP
WBC # BLD: 12.68 K/UL — HIGH (ref 3.8–10.5)
WBC # FLD AUTO: 12.68 K/UL — HIGH (ref 3.8–10.5)
WBC UR QL: SIGNIFICANT CHANGE UP /HPF (ref 0–5)

## 2023-05-22 PROCEDURE — 71260 CT THORAX DX C+: CPT | Mod: 26,MA

## 2023-05-22 PROCEDURE — 73030 X-RAY EXAM OF SHOULDER: CPT | Mod: 26,LT

## 2023-05-22 PROCEDURE — 70496 CT ANGIOGRAPHY HEAD: CPT | Mod: 26,MA

## 2023-05-22 PROCEDURE — 70498 CT ANGIOGRAPHY NECK: CPT | Mod: 26,MA

## 2023-05-22 PROCEDURE — 99291 CRITICAL CARE FIRST HOUR: CPT | Mod: 25,GC

## 2023-05-22 PROCEDURE — 99285 EMERGENCY DEPT VISIT HI MDM: CPT

## 2023-05-22 PROCEDURE — 31500 INSERT EMERGENCY AIRWAY: CPT | Mod: GC

## 2023-05-22 PROCEDURE — 0042T: CPT | Mod: MA

## 2023-05-22 PROCEDURE — 74177 CT ABD & PELVIS W/CONTRAST: CPT | Mod: 26,MA

## 2023-05-22 PROCEDURE — 71045 X-RAY EXAM CHEST 1 VIEW: CPT | Mod: 26

## 2023-05-22 PROCEDURE — 73060 X-RAY EXAM OF HUMERUS: CPT | Mod: 26,LT

## 2023-05-22 RX ORDER — CHLORHEXIDINE GLUCONATE 213 G/1000ML
15 SOLUTION TOPICAL EVERY 12 HOURS
Refills: 0 | Status: DISCONTINUED | OUTPATIENT
Start: 2023-05-22 | End: 2023-05-23

## 2023-05-22 RX ORDER — CHLORHEXIDINE GLUCONATE 213 G/1000ML
1 SOLUTION TOPICAL DAILY
Refills: 0 | Status: DISCONTINUED | OUTPATIENT
Start: 2023-05-22 | End: 2023-05-28

## 2023-05-22 RX ORDER — FENTANYL CITRATE 50 UG/ML
0.5 INJECTION INTRAVENOUS
Qty: 2500 | Refills: 0 | Status: DISCONTINUED | OUTPATIENT
Start: 2023-05-22 | End: 2023-05-22

## 2023-05-22 RX ORDER — FENTANYL CITRATE 50 UG/ML
0.5 INJECTION INTRAVENOUS
Qty: 2500 | Refills: 0 | Status: DISCONTINUED | OUTPATIENT
Start: 2023-05-22 | End: 2023-05-23

## 2023-05-22 RX ORDER — OLANZAPINE 15 MG/1
10 TABLET, FILM COATED ORAL ONCE
Refills: 0 | Status: COMPLETED | OUTPATIENT
Start: 2023-05-22 | End: 2023-05-22

## 2023-05-22 RX ORDER — ROCURONIUM BROMIDE 10 MG/ML
50 VIAL (ML) INTRAVENOUS ONCE
Refills: 0 | Status: COMPLETED | OUTPATIENT
Start: 2023-05-22 | End: 2023-05-22

## 2023-05-22 RX ORDER — PROPOFOL 10 MG/ML
20 INJECTION, EMULSION INTRAVENOUS
Qty: 1000 | Refills: 0 | Status: DISCONTINUED | OUTPATIENT
Start: 2023-05-22 | End: 2023-05-23

## 2023-05-22 RX ORDER — LEVETIRACETAM 250 MG/1
1000 TABLET, FILM COATED ORAL EVERY 12 HOURS
Refills: 0 | Status: DISCONTINUED | OUTPATIENT
Start: 2023-05-22 | End: 2023-05-28

## 2023-05-22 RX ORDER — PROTHROMBIN COMPLEX CONCENTRATE (HUMAN) 25.5; 16.5; 24; 22; 22; 26 [IU]/ML; [IU]/ML; [IU]/ML; [IU]/ML; [IU]/ML; [IU]/ML
4000 POWDER, FOR SOLUTION INTRAVENOUS ONCE
Refills: 0 | Status: COMPLETED | OUTPATIENT
Start: 2023-05-22 | End: 2023-05-22

## 2023-05-22 RX ORDER — MIDAZOLAM HYDROCHLORIDE 1 MG/ML
4 INJECTION, SOLUTION INTRAMUSCULAR; INTRAVENOUS ONCE
Refills: 0 | Status: DISCONTINUED | OUTPATIENT
Start: 2023-05-22 | End: 2023-05-22

## 2023-05-22 RX ORDER — FENTANYL CITRATE 50 UG/ML
50 INJECTION INTRAVENOUS ONCE
Refills: 0 | Status: DISCONTINUED | OUTPATIENT
Start: 2023-05-22 | End: 2023-05-22

## 2023-05-22 RX ORDER — ETOMIDATE 2 MG/ML
20 INJECTION INTRAVENOUS ONCE
Refills: 0 | Status: COMPLETED | OUTPATIENT
Start: 2023-05-22 | End: 2023-05-22

## 2023-05-22 RX ADMIN — LEVETIRACETAM 400 MILLIGRAM(S): 250 TABLET, FILM COATED ORAL at 21:22

## 2023-05-22 RX ADMIN — FENTANYL CITRATE 3.78 MICROGRAM(S)/KG/HR: 50 INJECTION INTRAVENOUS at 22:46

## 2023-05-22 RX ADMIN — FENTANYL CITRATE 3.78 MICROGRAM(S)/KG/HR: 50 INJECTION INTRAVENOUS at 21:23

## 2023-05-22 RX ADMIN — Medication 50 MILLIGRAM(S): at 19:33

## 2023-05-22 RX ADMIN — PROPOFOL 7.8 MICROGRAM(S)/KG/MIN: 10 INJECTION, EMULSION INTRAVENOUS at 20:26

## 2023-05-22 RX ADMIN — FENTANYL CITRATE 50 MICROGRAM(S): 50 INJECTION INTRAVENOUS at 21:22

## 2023-05-22 RX ADMIN — PROTHROMBIN COMPLEX CONCENTRATE (HUMAN) 400 INTERNATIONAL UNIT(S): 25.5; 16.5; 24; 22; 22; 26 POWDER, FOR SOLUTION INTRAVENOUS at 20:35

## 2023-05-22 RX ADMIN — ETOMIDATE 20 MILLIGRAM(S): 2 INJECTION INTRAVENOUS at 19:33

## 2023-05-22 RX ADMIN — MIDAZOLAM HYDROCHLORIDE 4 MILLIGRAM(S): 1 INJECTION, SOLUTION INTRAMUSCULAR; INTRAVENOUS at 18:30

## 2023-05-22 RX ADMIN — MIDAZOLAM HYDROCHLORIDE 4 MILLIGRAM(S): 1 INJECTION, SOLUTION INTRAMUSCULAR; INTRAVENOUS at 18:15

## 2023-05-22 RX ADMIN — OLANZAPINE 10 MILLIGRAM(S): 15 TABLET, FILM COATED ORAL at 18:25

## 2023-05-22 NOTE — H&P ADULT - NSHPPHYSICALEXAM_GEN_ALL_CORE
Constitutional: Intubated and sedated male  HEENT: Head is normocephalic and atraumatic, maxillofacial structures stable, no blood or discharge from nares or oral cavity, no burk sign / raccoon eyes, no active drainage or redness  Neck: Supple. trachea midline  Respiratory: Vented  Cardiovascular: Regular rate & rhythm,  Chest: Chest wall is stable, no subQ emphysema or crepitus palpated  Gastrointestinal: Abdomen soft,  non-distended, no ecchymosis or external signs of abdominal trauma  Extremities: no injuries or deformity noted to upper or lower extremities b/l  Pelvis: stable  Vascular: WWP  Back:  no step-offs or signs of external trauma to the back

## 2023-05-22 NOTE — CONSULT NOTE ADULT - ASSESSMENT
62 year old Male w/ extensive medical history including, CHF, ESRD, afib, HTN, MR, s/p PCI 1998/CABG 2020, former smoker, cocaine use, aortic dissection BIBEMS after being found down, on Eliquis. Intubated in ED w/ Marcos for airway protection, neurosurgery consulted for acute on chronic SDH.      Plan  - Imaging reviewed  - Recommend reversal of Eliquis  - Recommend repeat CTH in 6 hours to assess stability  - Q1 neuro checks  - Keppra  - Recommend neurology consult, vEEG 2/2 seizure history   - Normotensive   - Trauma workup 2/2 found down, traumatic acute on chronic SDH  - Medical management/supportive care per primary team  - No acute neurosurgical intervention at this time  - D/w Dr. Meek

## 2023-05-22 NOTE — CONSULT NOTE ADULT - SUBJECTIVE AND OBJECTIVE BOX
Patient is a 62 year old Male who presents with a chief complaint of found down    HISTORY OF PRESENT ILLNESS:   62 year old Male w/ extensive medical history including, CHF, ESRD, afib, HTN, MR, s/p PCI 1998/CABG 2020, former smoker, cocaine use, aortic dissection BIBEMS after being found down, on Eliquis. Intubated in ED w/ Marcos for airway protection> neurosurgery consulted for acute on chronic SDH. Patient seen and examined in ED, limited exam 2/2 prop and marcos. Pupils small but reactive, grimacing to pain, not following. Hypertensive in ED. Minimal motor response- unable to fully assess 2/2 paralytic and sedation- will reassess when stabilized.      PAST MEDICAL & SURGICAL HISTORY:  CHF (congestive heart failure)  CVA (cerebral vascular accident)  Seizures  last seizure 10 years ago  HTN (hypertension)  Hyperlipemia  COVID-19 october 2020  Atrial fibrillation  ESRD on dialysis  Tu/Thurs/Sat  Former smoker  History of cocaine use  remote hx, currently sober  H/O aortic dissection  s/p repair (2010), surgery complicated by bowel ischemia s/p bowel resection and ostomy with reversal  H/O aortic valve insufficiency  Mitral regurgitation  GIB (gastrointestinal bleeding)  CAD (coronary artery disease)  s/p PCI 1998  and CABG x 2 11/2020  H/O colectomy  Status post double vessel coronary artery bypass  11/2020 Dr Butler  S/P AVR (aortic valve replacement)  (t)AVR 11/2020 Dr Butler  S/P MVR (mitral valve replacement)  (t)MVR 11/2020 Dr Butler  H/O aortic dissection  Type A; emergent repair 2010  AV fistula  LUE    FAMILY HISTORY:  FH: hypertension  Family history of cardiac disorder (Mother)  mother    SOCIAL HISTORY:  Unable to obtain 2/2 mental staus    Allergies  penicillin (Other)    REVIEW OF SYSTEMS  Unable to obtain 2/2 mental status     HOME MEDICATIONS:  acetaminophen 325 mg oral tablet: 2 tab(s) orally every 6 hours, As needed, Mild Pain (1 - 3) (08 Nov 2021 12:02)  amLODIPine 5 mg oral tablet: 1 tab(s) orally once a day (01 Nov 2021 12:22)  aspirin 81 mg oral tablet: 1 tab(s) orally once a day (30 Jun 2022 11:15)  atorvastatin 40 mg oral tablet: 1 tab(s) orally once a day (at bedtime) (01 Nov 2021 12:22)  Eliquis 5 mg oral tablet: 1  orally once a day (01 Nov 2021 12:22)  levETIRAcetam 750 mg oral tablet: 1 tab(s) orally once a day (01 Nov 2021 12:22)  pantoprazole 40 mg oral delayed release tablet: 1 tab(s) orally once a day (before a meal) (08 Nov 2021 12:02)  sevelamer hydrochloride 800 mg oral tablet: 1 tab(s) orally 2 times a day (with meals) (01 Nov 2021 12:22)  tamsulosin 0.4 mg oral capsule: 1 cap(s) orally once a day (at bedtime) (08 Nov 2021 12:02)  torsemide 20 mg oral tablet: 1 tab(s) orally once a day (01 Nov 2021 12:22)  traMADol 50 mg oral tablet: 0.5 tab(s) orally every 4 hours, As needed, Moderate Pain (4 - 6) (08 Nov 2021 14:55)      MEDICATIONS:  Antibiotics:    Neuro:  propofol Infusion 20 MICROgram(s)/kG/Min IV Continuous <Continuous>    Anticoagulation:  prothrombin complex concentrate IVPB (KCENTRA) 4000 International Unit(s) IV Intermittent Once    OTHER:  chlorhexidine 0.12% Liquid 15 milliLiter(s) Oral Mucosa every 12 hours    IVF:      Vital Signs Last 24 Hrs  T(C): 36.1 (22 May 2023 19:31), Max: 36.1 (22 May 2023 19:31)  T(F): 97 (22 May 2023 19:31), Max: 97 (22 May 2023 19:31)  HR: 76 (22 May 2023 19:39) (76 - 90)  BP: 179/70 (22 May 2023 19:39) (179/70 - 186/83)  BP(mean): --  RR: 20 (22 May 2023 19:39) (20 - 20)  SpO2: 91% (22 May 2023 19:39) (91% - 100%)  Parameters below as of 22 May 2023 19:39  Patient On (Oxygen Delivery Method): ventilator    PHYSICAL EXAM:  GENERAL: NAD, obese, sedated, intubated   HEAD: Atraumatic, normocephalic  ROBINA COMA SCORE: E-1 V-1T M-4 = 6T       E: 4= opens eyes spontaneously 3= to voice 2= to noxious 1= no opening       V: 5= oriented 4= confused 3= inappropriate words 2= incomprehensible sounds 1= nonverbal 1T= intubated       M: 6= follows commands 5= localizes 4= withdraws 3= flexor posturing 2= extensor posturing 1= no movement  MENTAL STATUS: Comatose, no eye opening to noxious, facial grimacing to noxious. not following   CRANIAL NERVES: Pupils small but reactive. no tracking, not following   MOTOR/SENSATION: WD LLE, minimal motor response- unable to fully assess 2/2 paralytic and sedation   CHEST/LUNG: Intubated     LABS:             12.9   12.68 )-----------( 208      ( 22 May 2023 19:30 )             39.6     05-22    134<L>  |  99  |  28.6<H>  ----------------------------<  95  3.5   |  21.0<L>  |  1.81<H>    Ca    8.1<L>      22 May 2023 19:30    TPro  6.8  /  Alb  3.3  /  TBili  0.8  /  DBili  x   /  AST  x   /  ALT  x   /  AlkPhos  90  05-22    PT/INR - ( 22 May 2023 19:30 )   PT: 15.9 sec;   INR: 1.37 ratio         PTT - ( 22 May 2023 19:30 )  PTT:36.1 sec      CULTURES:      RADIOLOGY & ADDITIONAL STUDIES:  CT Brain Stroke Protocol (05.22.23 @ 18:56)   IMPRESSION:  Mixed attenuation subdural hematoma overlying the left frontal lobe,   likely reflecting acute on chronic hemorrhage measuring up to 9 mm in   greatest width. No significant mass effect on underlying parenchyma.  There is a large chronic infarct in the right posterior MCA territory, as   seen on prior exam.  Rest of the chronicfindings as above.  Critical value:  I discussed the finding of this report with Dr. Navarro at   7:05 PM on 5/22/2023.  Critical value policy of the hospital was   followed.  Read back and confirmation of receipt of this communication   was performed.  This verbal communication supplements the text report of   this document.

## 2023-05-22 NOTE — ED PROVIDER NOTE - OBJECTIVE STATEMENT
62y/oM PMH aortic dissection s/p repair (2010), surgery complicated by bowel ischemia s/p bowel resection and ostomy with reversal; bio AVR/MVR, HFpEF, afib on Eliquis; now p/w AMS. patient last seen normal 48hours ago. family states the tenant below his house heard a large thud. family immediately come to house where they found patient altered and appeared to be sleeping. family reported that patient had facial droop and ams.  upon arrival combative and received versed 10mg IM from EMS, but persistently combative.  code stroke called by triage and patient went directly to CT, but brought to critical care for stabilization.    PMH:  aortic dissection s/p repair (2010), surgery complicated by bowel ischemia s/p bowel resection and ostomy with reversal; bio AVR/MVR, HFpEF, afib on Eliquis  SOCIAL: prior cocaine use

## 2023-05-22 NOTE — ED ADULT NURSE REASSESSMENT NOTE - NURSING NEURO LEVEL OF CONSCIOUSNESS
unresponsive to verbal
unresponsive to pain

## 2023-05-22 NOTE — ED ADULT NURSE REASSESSMENT NOTE - NSFALLUNIVINTERV_ED_ALL_ED
Bed/Stretcher in lowest position, wheels locked, appropriate side rails in place/Call bell, personal items and telephone in reach/Instruct patient to call for assistance before getting out of bed/chair/stretcher/Non-slip footwear applied when patient is off stretcher/[place] to call system/Physically safe environment - no spills, clutter or unnecessary equipment/Purposeful proactive rounding/Room/bathroom lighting operational, light cord in reach

## 2023-05-22 NOTE — ED ADULT NURSE NOTE - NSFALLHARMRISKINTERV_ED_ALL_ED
Assistance OOB with selected safe patient handling equipment if applicable/Assistance with ambulation/Communicate risk of Fall with Harm to all staff, patient, and family/Encourage patient to sit up slowly, dangle for a short time, stand at bedside before walking/Monitor gait and stability/Monitor for mental status changes and reorient to person, place, and time, as needed/Provide visual cue: red socks, yellow wristband, yellow gown, etc/Reinforce activity limits and safety measures with patient and family/Review medications for side effects contributing to fall risk/Toileting schedule using arm’s reach rule for commode and bathroom/Use of alarms - bed, stretcher, chair and/or video monitoring/Bed in lowest position, wheels locked, appropriate side rails in place/Call bell, personal items and telephone in reach/Instruct patient to call for assistance before getting out of bed/chair/stretcher/Non-slip footwear applied when patient is off stretcher/[place] to call system/Physically safe environment - no spills, clutter or unnecessary equipment/Purposeful Proactive Rounding/Room/bathroom lighting operational, light cord in reach

## 2023-05-22 NOTE — H&P ADULT - NSHPLABSRESULTS_GEN_ALL_CORE
Vital Signs Last 24 Hrs  T(C): 35.7 (22 May 2023 23:55), Max: 36.7 (22 May 2023 20:35)  T(F): 96.3 (22 May 2023 23:55), Max: 98.1 (22 May 2023 20:35)  HR: 54 (22 May 2023 23:55) (54 - 90)  BP: 132/62 (22 May 2023 23:55) (126/59 - 186/98)  BP(mean): --  RR: 16 (22 May 2023 23:55) (16 - 20)  SpO2: 99% (22 May 2023 23:55) (91% - 100%)    Parameters below as of 22 May 2023 23:55  Patient On (Oxygen Delivery Method): ventilator  O2 Flow (L/min): 80        LABS:                        12.9   12.68 )-----------( 208      ( 22 May 2023 19:30 )             39.6     05-22    134<L>  |  99  |  28.6<H>  ----------------------------<  95  3.5   |  21.0<L>  |  1.81<H>    Ca    8.1<L>      22 May 2023 19:30    TPro  6.8  /  Alb  3.3  /  TBili  0.8  /  DBili  x   /  AST  33  /  ALT  22  /  AlkPhos  90  05-22    PT/INR - ( 22 May 2023 19:30 )   PT: 15.9 sec;   INR: 1.37 ratio       PTT - ( 22 May 2023 19:30 )  PTT:36.1 sec  Urinalysis Basic - ( 22 May 2023 20:09 )    Color: Yellow / Appearance: Clear / S.010 / pH: x  Gluc: x / Ketone: Negative  / Bili: Negative / Urobili: Negative mg/dL   Blood: x / Protein: 100 / Nitrite: Negative   Leuk Esterase: Negative / RBC: 3-5 /HPF / WBC 0-2 /HPF   Sq Epi: x / Non Sq Epi: x / Bacteria: Occasional        IMAGING:  CTA Ch / A/ P  IMPRESSION:  * Small right lower lobe tree-in-bud opacities, likely small foci of infection.  * Stable appearance of ascending aortic repair and descending thoracoabdominal aortic dissection flap. No evidence of branch vessel occlusion.      CAPO BUSTOS MD; Attending Radiologist  This document has been electronically signed. May 22 2023 7:53PM        CTA Head / Neck / Brain Perfusion   IMPRESSION:  CTA brain: No hemodynamically significant stenosis  CTA carotid/vertebral artery circulation: Aortic dissection extending into the left proximal subclavian artery, and minimally into the proximal right subclavian artery/proximal right common carotid artery, is reidentified without change from prior exam. There is heavy calcification of the right carotid bulb and proximal right internal carotid artery resulting in a focal 40% stenosis at the origin of the right ICA. There are calcifications of the left carotid bulb and proximal left internal carotid artery without hemodynamically significant stenosis.  CT Perfusion: Core infarct in the right posterior parietal and temporal lobes, as seen on CTA head. No territory at ischemic risk.      CLAUDIO SOTOMAYOR MD; Attending Radiologist  This document has been electronically signed. May 22 2023 7:38PM

## 2023-05-22 NOTE — ED ADULT NURSE NOTE - OBJECTIVE STATEMENT
PT brought in by EMS with AMS. PT unable to follow commands. RR even and unlabored on RA. PT thrashing in bed. PT found down at home with incontinences. LKW was 15oo. PT noted by family to have a right sided facial droop and AMS. Code stroke activated on arrival however delay in CT due to restlessness of pt despite medication and verbal commands. PT intubated for airway protection and sedated for CT scan. HX of AAA repair and is on Eliquis. ED attending at bedside.

## 2023-05-22 NOTE — H&P ADULT - ASSESSMENT
63yo M w/ PMH aortic dissection s/p repair (2010) now stable,bowel ischemia s/p bowel resection and ostomy with reversal, bio AVR/MVR, HFpEF, afib on Eliquis who presented with AMS. Found on work up with acute on chronic SDH. Unclear mechanism, presumed fall though timing is uncertain. No other signs of injury on exam, work-up without notable injuries either. Hemodynamically table, intubated for airway protection.     #Acute on Chronic SDH  - Admit to SICU under Dr. Odonnell for Trauma  - Neurosurgery recs appreciated, repeat CTH in 6 hrs  - Hold eliquis  - Wean sedation and eventual extubation  - Q1 Neuro  - Tertiary exam  - Resume pertinent home meds  - Possible Neuro consult  - SCD's      Plan discussed with Dr. Brody who agrees.

## 2023-05-22 NOTE — ED PROVIDER NOTE - ATTENDING CONTRIBUTION TO CARE
Upon my evaluation, this patient had a high probability of imminent or life-threatening deterioration due to SDH , which required my direct attention, intervention, and personal management.    I have personally provided 40 minutes of critical care time exclusive of time spent on separately billable procedures.  Time includes review of laboratory data, radiology results, discussion with consultants, and monitoring for potential decompensation.  Interventions were performed as documented.    I performed the initial face to face bedside interview with this patient regarding history of present illness, review of symptoms and past medical, social and family history.  I completed an independent physical examination.  I was the initial provider who evaluated this patient.  The history, review of symptoms and examination was documented by the scribe in my presence and I attest to the accuracy of the documentation.    The resident will be participating in the care of this patient under my direct supervision.

## 2023-05-22 NOTE — ED ADULT NURSE REASSESSMENT NOTE - NS ED NURSE REASSESS COMMENT FT1
Report received from AGUSTO Gil for continuity of care. Patient received as CODE STROKE via EMS, altered, given versed and other sedatives en route before intubation for altered mental status, airway protection and aggression that was interfering with care. Patient now sedated, calm, GCS 3T, skin in tact. Chapman placed, propofol infusing well to L PIV, placed in hospital gown. Family brought to bedside with MD Navarro. Pending bed assignment MICU vs NSICU. VS as documented. Safety maintained, needs attended, will continue to monitor.

## 2023-05-22 NOTE — H&P ADULT - HISTORY OF PRESENT ILLNESS
63yo M w/ PMH aortic dissection s/p repair (2010) now stable,bowel ischemia s/p bowel resection and ostomy with reversal, bio AVR/MVR, HFpEF, afib on Eliquis who presented with AMS. Found intubated and sedated, no family at bedside. HPI per ED note was that patient. was last seen normal 48hours ago. Family reported that neighbor below his house heard a large thud. Family came to find patient altered and appeared to be sleeping.with facial droop and am weakness. Upon arrival in ED was combative and received versed 10mg IM from EMS, Patient was intubated per ED for airway protection, supposedly GCS of 6. Code stroke called and patient went directly to CT, found with acute on chronic SDH.

## 2023-05-22 NOTE — ED PROVIDER NOTE - CLINICAL SUMMARY MEDICAL DECISION MAKING FREE TEXT BOX
62y/oM PMH aortic dissection s/p repair (2010), surgery complicated by bowel ischemia s/p bowel resection and ostomy with reversal; bio AVR/MVR, HFpEF, afib on Eliquis; now p/w AMS. patient last seen normal 48hours ago. family states the tenant below his house heard a large thud. family immediately come to house where they found patient altered and appeared to be sleeping. family reported that patient had facial droop and ams.  upon arrival combative and received versed 10mg IM from EMS, but persistently combative.  code stroke called by triage and patient went directly to CT, but brought to critical care for stabilization.  intubated, provided sedation. found to have sdh and nsg called. kcentra given for reversal. trauma consult called. family updated.  Patient signed out to incoming physician.  All decisions regarding the progression of care will be made at their discretion.

## 2023-05-22 NOTE — ED ADULT NURSE NOTE - ED STAT RN HANDOFF TIME
Discharge instructions given at bedside with the patient and his spouse.
Rio Ornelas  1935  [id number]    Situation:  Verbal report received from: Nova Sadler RN  Procedure: Procedure(s):  COLONOSCOPY    Background:    Preoperative diagnosis: IRON DEFICIENCY, ANEMIA  Postoperative diagnosis: Diverticulosis    :  Dr. Rachel Schaefer  Assistant(s): Endoscopy Technician-1: Enrike Magallanes  Endoscopy RN-1: Brea Luo RN    Specimens: * No specimens in log *  H. Pylori  no    Assessment:  Intra-procedure medications      Anesthesia gave intra-procedure sedation and medications, see anesthesia flow sheet yes    Intravenous fluids: NS@ KVO     Vital signs stable   yes    Abdominal assessment: round and soft   yes    Recommendation:  Discharge patient per MD order  yes.   Return to floor  outpatient  Family or Friend   spouse  Permission to share finding with family or friend yes
23:08

## 2023-05-22 NOTE — H&P ADULT - NS ATTEND AMEND GEN_ALL_CORE FT
I have seen and examined this patient with the SICU PA.    This is a 62-year-old male with PMHx aortic dissection s/p repair with bowel ischemia s/p resection, HFpEF, AVR/MVR, atrial fibrillation on Eliquis, prior CVA, h/o cocaine use presents after being found down.  The circumstances around his being down are unclear (found immediately vs 48 hours down).  He was noted be combative but with a possible GCS 6, and was intubated. CTH demonstrated a small SDH in the left frontal area.  He will need a repeat CTH and aNSx consult.  We will also obtain EKG, EEG, UTox, and workup his reason for being found down.

## 2023-05-22 NOTE — ED PROVIDER NOTE - PHYSICAL EXAMINATION
Gen: combative, confused  Head: NC, AT, PERRL 3mm/reactive, EOMi  Neck: +supple, no tenderness/meningismus/JVD, +Trachea midline  Pulm: Bilateral BS, normal resp effort, no wheeze/stridor/retractions  CV: tachycardic no M/R/G, 2+dist pulses  Abd: soft, NT/ND, +BS, no hepatosplenomegaly  Mskel: ROM intact x4 extremities.  no edema/erythema/cyanosis  Skin: no rash, warm, dry  Neuro: combative, oliver x4, gurgling words, GCS 6

## 2023-05-22 NOTE — ED PROVIDER NOTE - PROGRESS NOTE DETAILS
Patient evaluated by the neurosurgical team.  Patient received in sign-out pending trauma consultation. Patient to be admitted to SICU under Dr. Brody. Kanika Mathur MD PGY-2

## 2023-05-23 ENCOUNTER — TRANSCRIPTION ENCOUNTER (OUTPATIENT)
Age: 63
End: 2023-05-23

## 2023-05-23 LAB
ANION GAP SERPL CALC-SCNC: 12 MMOL/L — SIGNIFICANT CHANGE UP (ref 5–17)
APTT BLD: 35.5 SEC — SIGNIFICANT CHANGE UP (ref 27.5–35.5)
BASOPHILS # BLD AUTO: 0.05 K/UL — SIGNIFICANT CHANGE UP (ref 0–0.2)
BASOPHILS NFR BLD AUTO: 0.5 % — SIGNIFICANT CHANGE UP (ref 0–2)
BUN SERPL-MCNC: 26.8 MG/DL — HIGH (ref 8–20)
CALCIUM SERPL-MCNC: 8.1 MG/DL — LOW (ref 8.4–10.5)
CHLORIDE SERPL-SCNC: 105 MMOL/L — SIGNIFICANT CHANGE UP (ref 96–108)
CK MB CFR SERPL CALC: 11.2 NG/ML — HIGH (ref 0–6.7)
CK MB CFR SERPL CALC: 8 NG/ML — HIGH (ref 0–6.7)
CK SERPL-CCNC: 3268 U/L — HIGH (ref 30–200)
CK SERPL-CCNC: 3435 U/L — HIGH (ref 30–200)
CO2 SERPL-SCNC: 22 MMOL/L — SIGNIFICANT CHANGE UP (ref 22–29)
CREAT SERPL-MCNC: 1.9 MG/DL — HIGH (ref 0.5–1.3)
CULTURE RESULTS: NO GROWTH — SIGNIFICANT CHANGE UP
EGFR: 40 ML/MIN/1.73M2 — LOW
EOSINOPHIL # BLD AUTO: 0.12 K/UL — SIGNIFICANT CHANGE UP (ref 0–0.5)
EOSINOPHIL NFR BLD AUTO: 1.2 % — SIGNIFICANT CHANGE UP (ref 0–6)
GAS PNL BLDA: SIGNIFICANT CHANGE UP
GLUCOSE BLDC GLUCOMTR-MCNC: 86 MG/DL — SIGNIFICANT CHANGE UP (ref 70–99)
GLUCOSE SERPL-MCNC: 89 MG/DL — SIGNIFICANT CHANGE UP (ref 70–99)
HCT VFR BLD CALC: 35.2 % — LOW (ref 39–50)
HGB BLD-MCNC: 11.1 G/DL — LOW (ref 13–17)
IMM GRANULOCYTES NFR BLD AUTO: 0.5 % — SIGNIFICANT CHANGE UP (ref 0–0.9)
INR BLD: 1.11 RATIO — SIGNIFICANT CHANGE UP (ref 0.88–1.16)
LYMPHOCYTES # BLD AUTO: 1.27 K/UL — SIGNIFICANT CHANGE UP (ref 1–3.3)
LYMPHOCYTES # BLD AUTO: 12.2 % — LOW (ref 13–44)
MAGNESIUM SERPL-MCNC: 2.1 MG/DL — SIGNIFICANT CHANGE UP (ref 1.6–2.6)
MCHC RBC-ENTMCNC: 26.9 PG — LOW (ref 27–34)
MCHC RBC-ENTMCNC: 31.5 GM/DL — LOW (ref 32–36)
MCV RBC AUTO: 85.4 FL — SIGNIFICANT CHANGE UP (ref 80–100)
MONOCYTES # BLD AUTO: 0.89 K/UL — SIGNIFICANT CHANGE UP (ref 0–0.9)
MONOCYTES NFR BLD AUTO: 8.6 % — SIGNIFICANT CHANGE UP (ref 2–14)
MRSA PCR RESULT.: SIGNIFICANT CHANGE UP
NEUTROPHILS # BLD AUTO: 8 K/UL — HIGH (ref 1.8–7.4)
NEUTROPHILS NFR BLD AUTO: 77 % — SIGNIFICANT CHANGE UP (ref 43–77)
PHOSPHATE SERPL-MCNC: 3.5 MG/DL — SIGNIFICANT CHANGE UP (ref 2.4–4.7)
PLATELET # BLD AUTO: 168 K/UL — SIGNIFICANT CHANGE UP (ref 150–400)
POTASSIUM SERPL-MCNC: 3.4 MMOL/L — LOW (ref 3.5–5.3)
POTASSIUM SERPL-SCNC: 3.4 MMOL/L — LOW (ref 3.5–5.3)
PROTHROM AB SERPL-ACNC: 12.9 SEC — SIGNIFICANT CHANGE UP (ref 10.5–13.4)
RBC # BLD: 4.12 M/UL — LOW (ref 4.2–5.8)
RBC # FLD: 16.2 % — HIGH (ref 10.3–14.5)
S AUREUS DNA NOSE QL NAA+PROBE: DETECTED
SODIUM SERPL-SCNC: 139 MMOL/L — SIGNIFICANT CHANGE UP (ref 135–145)
SPECIMEN SOURCE: SIGNIFICANT CHANGE UP
T3 SERPL-MCNC: 79 NG/DL — LOW (ref 80–200)
T4 AB SER-ACNC: 4.8 UG/DL — SIGNIFICANT CHANGE UP (ref 4.5–12)
T4 FREE SERPL-MCNC: 1.2 NG/DL — SIGNIFICANT CHANGE UP (ref 0.9–1.8)
TSH SERPL-MCNC: 0.93 UIU/ML — SIGNIFICANT CHANGE UP (ref 0.27–4.2)
WBC # BLD: 10.38 K/UL — SIGNIFICANT CHANGE UP (ref 3.8–10.5)
WBC # FLD AUTO: 10.38 K/UL — SIGNIFICANT CHANGE UP (ref 3.8–10.5)

## 2023-05-23 PROCEDURE — 93010 ELECTROCARDIOGRAM REPORT: CPT

## 2023-05-23 PROCEDURE — 93308 TTE F-UP OR LMTD: CPT | Mod: 26

## 2023-05-23 PROCEDURE — 99232 SBSQ HOSP IP/OBS MODERATE 35: CPT | Mod: FS

## 2023-05-23 PROCEDURE — 71045 X-RAY EXAM CHEST 1 VIEW: CPT | Mod: 26

## 2023-05-23 PROCEDURE — 70450 CT HEAD/BRAIN W/O DYE: CPT | Mod: 26

## 2023-05-23 PROCEDURE — 99223 1ST HOSP IP/OBS HIGH 75: CPT

## 2023-05-23 PROCEDURE — 99291 CRITICAL CARE FIRST HOUR: CPT | Mod: FS

## 2023-05-23 PROCEDURE — 95720 EEG PHY/QHP EA INCR W/VEEG: CPT

## 2023-05-23 RX ORDER — ACETAMINOPHEN 500 MG
1000 TABLET ORAL ONCE
Refills: 0 | Status: COMPLETED | OUTPATIENT
Start: 2023-05-23 | End: 2023-05-24

## 2023-05-23 RX ORDER — CARVEDILOL PHOSPHATE 80 MG/1
12.5 CAPSULE, EXTENDED RELEASE ORAL EVERY 12 HOURS
Refills: 0 | Status: DISCONTINUED | OUTPATIENT
Start: 2023-05-23 | End: 2023-05-28

## 2023-05-23 RX ORDER — POTASSIUM CHLORIDE 20 MEQ
10 PACKET (EA) ORAL
Refills: 0 | Status: COMPLETED | OUTPATIENT
Start: 2023-05-23 | End: 2023-05-23

## 2023-05-23 RX ORDER — PROPOFOL 10 MG/ML
20 INJECTION, EMULSION INTRAVENOUS
Qty: 1000 | Refills: 0 | Status: DISCONTINUED | OUTPATIENT
Start: 2023-05-23 | End: 2023-05-23

## 2023-05-23 RX ORDER — ATORVASTATIN CALCIUM 80 MG/1
40 TABLET, FILM COATED ORAL AT BEDTIME
Refills: 0 | Status: DISCONTINUED | OUTPATIENT
Start: 2023-05-23 | End: 2023-05-24

## 2023-05-23 RX ORDER — FENTANYL CITRATE 50 UG/ML
0.5 INJECTION INTRAVENOUS
Qty: 2500 | Refills: 0 | Status: DISCONTINUED | OUTPATIENT
Start: 2023-05-23 | End: 2023-05-23

## 2023-05-23 RX ORDER — SODIUM CHLORIDE 9 MG/ML
1000 INJECTION INTRAMUSCULAR; INTRAVENOUS; SUBCUTANEOUS
Refills: 0 | Status: DISCONTINUED | OUTPATIENT
Start: 2023-05-23 | End: 2023-05-24

## 2023-05-23 RX ORDER — CALCIUM GLUCONATE 100 MG/ML
2 VIAL (ML) INTRAVENOUS ONCE
Refills: 0 | Status: COMPLETED | OUTPATIENT
Start: 2023-05-23 | End: 2023-05-23

## 2023-05-23 RX ADMIN — LEVETIRACETAM 400 MILLIGRAM(S): 250 TABLET, FILM COATED ORAL at 05:57

## 2023-05-23 RX ADMIN — CHLORHEXIDINE GLUCONATE 1 APPLICATION(S): 213 SOLUTION TOPICAL at 17:16

## 2023-05-23 RX ADMIN — FENTANYL CITRATE 4.35 MICROGRAM(S)/KG/HR: 50 INJECTION INTRAVENOUS at 03:38

## 2023-05-23 RX ADMIN — LEVETIRACETAM 400 MILLIGRAM(S): 250 TABLET, FILM COATED ORAL at 17:15

## 2023-05-23 RX ADMIN — CHLORHEXIDINE GLUCONATE 15 MILLILITER(S): 213 SOLUTION TOPICAL at 05:57

## 2023-05-23 RX ADMIN — Medication 100 MILLIEQUIVALENT(S): at 09:05

## 2023-05-23 RX ADMIN — ATORVASTATIN CALCIUM 40 MILLIGRAM(S): 80 TABLET, FILM COATED ORAL at 21:53

## 2023-05-23 RX ADMIN — Medication 100 MILLIEQUIVALENT(S): at 08:14

## 2023-05-23 RX ADMIN — CARVEDILOL PHOSPHATE 12.5 MILLIGRAM(S): 80 CAPSULE, EXTENDED RELEASE ORAL at 19:28

## 2023-05-23 RX ADMIN — Medication 100 MILLIEQUIVALENT(S): at 10:18

## 2023-05-23 RX ADMIN — PROPOFOL 10.4 MICROGRAM(S)/KG/MIN: 10 INJECTION, EMULSION INTRAVENOUS at 03:37

## 2023-05-23 RX ADMIN — Medication 200 GRAM(S): at 08:15

## 2023-05-23 RX ADMIN — SODIUM CHLORIDE 75 MILLILITER(S): 9 INJECTION INTRAMUSCULAR; INTRAVENOUS; SUBCUTANEOUS at 02:06

## 2023-05-23 NOTE — PATIENT PROFILE ADULT - FALL HARM RISK - HARM RISK INTERVENTIONS

## 2023-05-23 NOTE — PROGRESS NOTE ADULT - SUBJECTIVE AND OBJECTIVE BOX
HISTORY  63yo M w/ PMH aortic dissection s/p repair (2010), bowel ischemia s/p bowel resection and ostomy with reversal, bio AVR/MVR, HFpEF, afib on Eliquis who presented with AMS. Found intubated and sedated, no family at bedside. HPI per ED note was that patient. was last seen normal 48hours ago. Family reported that neighbor below his house heard a large thud. Family came to find patient altered and appeared to be sleeping.with facial droop and am weakness. Upon arrival in ED was combative and received versed 10mg IM from EMS, Patient was intubated per ED for airway protection, supposedly GCS of 6. Code stroke called and patient went directly to CT, found with acute on chronic SDH.          SUBJECTIVE/ROS:  [ ] A ten-point review of systems was otherwise negative except as noted.  [ ] Due to altered mental status/intubation, subjective information were not able to be obtained from the patient. History was obtained, to the extent possible, from review of the chart and collateral sources of information.      NEURO  RASS:     GCS:     CAM ICU:  Exam:   Meds: fentaNYL   Infusion 0.5 MICROgram(s)/kG/Hr IV Continuous <Continuous>  levETIRAcetam  IVPB 1000 milliGRAM(s) IV Intermittent every 12 hours  propofol Infusion 20 MICROgram(s)/kG/Min IV Continuous <Continuous>    [x] Adequacy of sedation and pain control has been assessed and adjusted      RESPIRATORY  RR: 16 (05-23-23 @ 03:00) (16 - 20)  SpO2: 100% (05-23-23 @ 03:00) (91% - 100%)  Wt(kg): --  Exam: unlabored, clear to auscultation bilaterally  Mechanical Ventilation: Mode: AC/ CMV (Assist Control/ Continuous Mandatory Ventilation), RR (machine): 16, RR (patient): 16, TV (machine): 450, FiO2: 40, PEEP: 6, ITime: 1, MAP: 10, PIP: 23    [ ] Extubation Readiness Assessed  Meds:       CARDIOVASCULAR  HR: 57 (05-23-23 @ 03:00) (42 - 90)  BP: 119/63 (05-23-23 @ 03:00) (100/59 - 186/98)  BP(mean): 79 (05-23-23 @ 03:00) (72 - 97)  ABP: --  ABP(mean): --  Wt(kg): --  CVP(cm H2O): --      Exam:  Cardiac Rhythm:  Perfusion     [ ]Adequate   [ ]Inadequate  Mentation   [ ]Normal       [ ]Reduced  Extremities  [ ]Warm         [ ]Cool  Volume Status [ ]Hypervolemic [ ]Euvolemic [ ]Hypovolemic  Meds:       GI/NUTRITION  Exam:  Diet:  Meds:     GENITOURINARY  I&O's Detail    05-22 @ 07:01  -  05-23 @ 03:33  --------------------------------------------------------  IN:  Total IN: 0 mL    OUT:    FentaNYL: 0 mL    Indwelling Catheter - Urethral (mL): 200 mL    Propofol: 0 mL  Total OUT: 200 mL    Total NET: -200 mL        Weight (kg): 87 (05-23 @ 00:25)  05-22    134<L>  |  99  |  28.6<H>  ----------------------------<  95  3.5   |  21.0<L>  |  1.81<H>    Ca    8.1<L>      22 May 2023 19:30    TPro  6.8  /  Alb  3.3  /  TBili  0.8  /  DBili  x   /  AST  33  /  ALT  22  /  AlkPhos  90  05-22    [ ] Chapman catheter, indication: N/A  Meds: sodium chloride 0.9%. 1000 milliLiter(s) IV Continuous <Continuous>        HEMATOLOGIC  Meds:   [x] VTE Prophylaxis                        12.9   12.68 )-----------( 208      ( 22 May 2023 19:30 )             39.6     PT/INR - ( 22 May 2023 19:30 )   PT: 15.9 sec;   INR: 1.37 ratio         PTT - ( 22 May 2023 19:30 )  PTT:36.1 sec  Transfusion     [ ] PRBC   [ ] Platelets   [ ] FFP   [ ] Cryoprecipitate      INFECTIOUS DISEASES  T(C): 35.7 (05-23-23 @ 03:00), Max: 36.7 (05-22-23 @ 20:35)  Wt(kg): --  WBC Count: 12.68 K/uL (05-22 @ 19:30)    Recent Cultures:    Meds:       ENDOCRINE  Capillary Blood Glucose    Meds:       ACCESS DEVICES:  [ ] Peripheral IV  [ ] Central Venous Line	[ ] R	[ ] L	[ ] IJ	[ ] Fem	[ ] SC	Placed:   [ ] Arterial Line		[ ] R	[ ] L	[ ] Fem	[ ] Rad	[ ] Ax	Placed:   [ ] PICC:					[ ] Mediport  [ ] Urinary Catheter, Date Placed:   [ ] Necessity of urinary, arterial, and venous catheters discussed    OTHER MEDICATIONS:  chlorhexidine 0.12% Liquid 15 milliLiter(s) Oral Mucosa every 12 hours  chlorhexidine 2% Cloths 1 Application(s) Topical daily      CODE STATUS:     IMAGING: HISTORY  61yo M w/ PMH aortic dissection s/p repair (2010), bowel ischemia s/p bowel resection and ostomy with reversal, bio AVR/MVR, HFpEF, afib on Eliquis who presented with AMS/ agitation.  Per ED note patient was last seen normal 48hours ago by family. Family reported that neighbor who lives below his house heard a large thud. Family came to find patient altered and appeared to be sleeping, with facial droop and arm weakness. Upon arrival in ED was combative and received versed 10mg IM from EMS, Patient was intubated per ED for airway protection, supposedly GCS of 6. Code stroke called and patient went directly to CT, found with acute on chronic SDH.  Patient admitted to SICU intubated and sedated, will move all extremities. Does not follow commands, does not open eyes. EEG ordered. Repeat CT head stable. Patient reversed w/ Kcentra. CXR and ABG pending. Tolerating PSV. Patient w/ sinus bradycardia when presented now sinus rhythm. BP wnl. Patient also noted to be hypothermic on warming blanket.         SUBJECTIVE/ROS:  [ ] A ten-point review of systems was otherwise negative except as noted.  [x ] Due to altered mental status/intubation, subjective information were not able to be obtained from the patient. History was obtained, to the extent possible, from review of the chart and collateral sources of information.      ICU Vital Signs Last 24 Hrs  T(C): 35.7 (23 May 2023 03:00), Max: 36.7 (22 May 2023 20:35)  T(F): 96.3 (23 May 2023 03:00), Max: 98.1 (22 May 2023 20:35)  HR: 59 (23 May 2023 03:35) (42 - 90)  BP: 119/63 (23 May 2023 03:00) (100/59 - 186/98)  BP(mean): 79 (23 May 2023 03:00) (72 - 97)  ABP: --  ABP(mean): --  RR: 16 (23 May 2023 03:00) (16 - 20)  SpO2: 99% (23 May 2023 03:35) (91% - 100%)    O2 Parameters below as of 23 May 2023 00:25  Patient On (Oxygen Delivery Method): ventilator        Physical   Constitutional: NAD, intubated and sedated   Eyes: PERRL, pinpoint, 2mm reactive, no response to threat   Head: NCAT  Neck: trachea midline  Respiratory: unlabored   Cardiovascular: S1S2, RRR  Gastrointestinal: abd. soft, NT/ND, old midline scar  Genitourinary: +painting  Extremities: no LE edema b/l  Neurological: does not open eyes, does not respond to threat, negative corneals, + gag/cough, CORDOVA upper extremities briskly localizing.   Skin: warm, dry, intact      NEURO  RASS:     GCS:     CAM ICU:  Exam:   Meds: fentaNYL   Infusion 0.5 MICROgram(s)/kG/Hr IV Continuous <Continuous>  levETIRAcetam  IVPB 1000 milliGRAM(s) IV Intermittent every 12 hours  propofol Infusion 20 MICROgram(s)/kG/Min IV Continuous <Continuous>    [x] Adequacy of sedation and pain control has been assessed and adjusted      RESPIRATORY  RR: 16 (05-23-23 @ 03:00) (16 - 20)  SpO2: 100% (05-23-23 @ 03:00) (91% - 100%)  Wt(kg): --  Exam: unlabored, clear to auscultation bilaterally  Mechanical Ventilation: Mode: AC/ CMV (Assist Control/ Continuous Mandatory Ventilation), RR (machine): 16, RR (patient): 16, TV (machine): 450, FiO2: 40, PEEP: 6, ITime: 1, MAP: 10, PIP: 23    [ ] Extubation Readiness Assessed  Meds:       CARDIOVASCULAR  HR: 57 (05-23-23 @ 03:00) (42 - 90)  BP: 119/63 (05-23-23 @ 03:00) (100/59 - 186/98)  BP(mean): 79 (05-23-23 @ 03:00) (72 - 97)  ABP: --  ABP(mean): --  Wt(kg): --  CVP(cm H2O): --      Exam:  Cardiac Rhythm:  Perfusion     [ ]Adequate   [ ]Inadequate  Mentation   [ ]Normal       [ ]Reduced  Extremities  [ ]Warm         [ ]Cool  Volume Status [ ]Hypervolemic [ ]Euvolemic [ ]Hypovolemic  Meds:       GI/NUTRITION  Exam:  Diet:  Meds:     GENITOURINARY  I&O's Detail    05-22 @ 07:01  -  05-23 @ 03:33  --------------------------------------------------------  IN:  Total IN: 0 mL    OUT:    FentaNYL: 0 mL    Indwelling Catheter - Urethral (mL): 200 mL    Propofol: 0 mL  Total OUT: 200 mL    Total NET: -200 mL        Weight (kg): 87 (05-23 @ 00:25)  05-22    134<L>  |  99  |  28.6<H>  ----------------------------<  95  3.5   |  21.0<L>  |  1.81<H>    Ca    8.1<L>      22 May 2023 19:30    TPro  6.8  /  Alb  3.3  /  TBili  0.8  /  DBili  x   /  AST  33  /  ALT  22  /  AlkPhos  90  05-22    [ ] Painting catheter, indication: N/A  Meds: sodium chloride 0.9%. 1000 milliLiter(s) IV Continuous <Continuous>        HEMATOLOGIC  Meds:   [x] VTE Prophylaxis                        12.9   12.68 )-----------( 208      ( 22 May 2023 19:30 )             39.6     PT/INR - ( 22 May 2023 19:30 )   PT: 15.9 sec;   INR: 1.37 ratio         PTT - ( 22 May 2023 19:30 )  PTT:36.1 sec  Transfusion     [ ] PRBC   [ ] Platelets   [ ] FFP   [ ] Cryoprecipitate      INFECTIOUS DISEASES  T(C): 35.7 (05-23-23 @ 03:00), Max: 36.7 (05-22-23 @ 20:35)  Wt(kg): --  WBC Count: 12.68 K/uL (05-22 @ 19:30)    Recent Cultures:    Meds:       ENDOCRINE  Capillary Blood Glucose    Meds:       ACCESS DEVICES:  [ ] Peripheral IV  [ ] Central Venous Line	[ ] R	[ ] L	[ ] IJ	[ ] Fem	[ ] SC	Placed:   [ ] Arterial Line		[ ] R	[ ] L	[ ] Fem	[ ] Rad	[ ] Ax	Placed:   [ ] PICC:					[ ] Mediport  [ ] Urinary Catheter, Date Placed:   [ ] Necessity of urinary, arterial, and venous catheters discussed    OTHER MEDICATIONS:  chlorhexidine 0.12% Liquid 15 milliLiter(s) Oral Mucosa every 12 hours  chlorhexidine 2% Cloths 1 Application(s) Topical daily      CODE STATUS:     IMAGING:

## 2023-05-23 NOTE — DISCHARGE NOTE NURSING/CASE MANAGEMENT/SOCIAL WORK - NSDCVIVACCINE_GEN_ALL_CORE_FT
influenza, injectable, quadrivalent, preservative free; 12-Dec-2020 09:28; Lynn Gerardo); Unda; 724k2 (Exp. Date: 30-Jun-2021); IntraMuscular; Deltoid Right.; 0.5 milliLiter(s); VIS (VIS Published: 15-Aug-2019, VIS Presented: 12-Dec-2020);

## 2023-05-23 NOTE — PROGRESS NOTE ADULT - ASSESSMENT
62 year old Male w/ extensive medical history including, CHF, ESRD, afib, HTN, MR, s/p PCI 1998/CABG 2020, former smoker, cocaine use, aortic dissection BIBEMS after being found down, on Eliquis. Intubated in ED w/ Marcos for airway protection, neurosurgery consulted for acute on chronic SDH.  -Repeat CTH stable.    Plan:  -D/w Dr. Meek  -Q1h neuro checks  -Imaging reviewed  -Keppra 500 mg BID for seizure PPX   -SCDs for DVT PPX; hold AC/AP agents at this time d/t increased bleeding risk  -Recommend vEEG & Neurology consult 2/2 seizure Hx  --150  -Further medical management per primary team  62 year old Male w/ extensive medical history including, CHF, ESRD, afib, HTN, MR, s/p PCI 1998/CABG 2020, former smoker, cocaine use, aortic dissection BIBEMS after being found down, on Eliquis. Intubated in ED w/ Marcos for airway protection, neurosurgery consulted for acute on chronic SDH.  -Repeat CTH stable.  -Extubated on 5/23.    Plan:  -D/w Dr. Meek  -Q1h neuro checks  -Imaging reviewed  -Keppra 500 mg BID for seizure PPX   -SCDs for DVT PPX; hold AC/AP agents at this time d/t increased bleeding risk  -On vEEG to assess for possible seizures   --150  -Pain control PRN   -Further medical management per primary team

## 2023-05-23 NOTE — DISCHARGE NOTE NURSING/CASE MANAGEMENT/SOCIAL WORK - NSDCPEFALRISK_GEN_ALL_CORE
For information on Fall & Injury Prevention, visit: [email or web address] OR  [email or web address] OR  https://www.cdc.gov/steadi/patient.html

## 2023-05-23 NOTE — CONSULT NOTE ADULT - NS ATTEND AMEND GEN_ALL_CORE FT
Seen and examined with the NP.  Plan discussed with NP.  I agree with as written above with modifications as below    Subdural hematoma  AMS  possible aphasia, although speaks better when prompted and given ample time to answer  check MRI brain  anticoagualtion when cleared by neurosurgery and trauma.  continue keppra, follow cvEEG    will follow with you    Vincenzo Meléndez MD PhD   [id number]

## 2023-05-23 NOTE — PROGRESS NOTE ADULT - NS ATTEND AMEND GEN_ALL_CORE FT
62 year old man found down with altered mental status.   Intubated in ED.   CTH showed small SDH which has been stable on repeat.       NEURO: Sedated with propofol and fentanyl.   -Continue home Keppra   -Will obtain EEG as patient has possible seizure history as he is prescribed Keppra.   -No formal CT C-spine. Will  place C-collar and attempt to obtain reformat of CTA Neck.     RESP: Intubated for airway protection. Tolerating PSV at 6/6. CXR clear. ABG reviewed, unremarkable.     CVS: Hemodynamically stable. Extensive cardiac history. Will obtain TTE.     GI: NPO/NGT. Will start tube feeds today if unable to be extubated.     : Chapman in place with adequate urine output. ESRD  on diayldid MWF with Cr 1.9 today. Likely had dialysis within the last 48 hours.   Elevated CK at 3500. Continue to trend.    Continue NS at 75cc/hr.     HEME: No evidence of active bleeding or coagulopathy. On elequis at home which was revered with Judy. Will hold chemical AC due tro intracranial hermorrhage.     ENDO: Glucose controlled 62 year old man found down with altered mental status.   Intubated in ED.   CTH showed small SDH which has been stable on repeat.     NEURO: Mental status improved. Now following commands.   -Continue home Keppra   -Will obtain EEG as patient has possible seizure history as he is prescribed Keppra.   -No formal CT C-spine. Will  place C-collar and attempt to obtain reformats of CTA Neck.     RESP: Intubated for airway protection. Tolerating PSV at 6/6. CXR clear. ABG reviewed, unremarkable. Plan for possible extubation today.     CVS: Hemodynamically stable. Extensive cardiac history. Will obtain TTE.     GI: NPO/NGT. Will start tube feeds today if unable to be extubated.     : Chapman in place with adequate urine output. History of advanced CKD but has not required dialysis for a year.   Elevated CK at 3500. Continue to trend.    Continue NS at 75cc/hr.     HEME: No evidence of active bleeding or coagulopathy. On Eliquis at home which was reversed with Kcentra. Will hold chemical AC due to intracranial hemorrhage.     ENDO: Glucose controlled    Addendum  Patient extubated. Noted to have expressive aphasia however no focal deficits. Brother provided collateral that he is normally oriented and that he was found with expressive aphasia at home yesterday. Concern for new stroke. Will obtain urgent neurology evaluation. Patient is not a candidate for thrombolysis or AC secondary to active intracranial hemorrhage.

## 2023-05-23 NOTE — CONSULT NOTE ADULT - SUBJECTIVE AND OBJECTIVE BOX
Preliminary Note Pending Attending Approval                              [facility] Stroke Consult Note  CC: INCOMPLETE  HPI:  61yo M w/ PMH aortic dissection s/p repair (2010) now stable,bowel ischemia s/p bowel resection and ostomy with reversal, bio AVR/MVR, HFpEF, afib on Eliquis who presented with AMS. Found intubated and sedated, no family at bedside. HPI per ED note was that patient. was last seen normal 48hours ago. Family reported that neighbor below his house heard a large thud. Family came to find patient altered and appeared to be sleeping.with facial droop and am weakness. Upon arrival in ED was combative and received versed 10mg IM from EMS, Patient was intubated per ED for airway protection, supposedly GCS of 6. Code stroke called and patient went directly to CT, found with acute on chronic SDH.        PAST MEDICAL & SURGICAL HISTORY:  CHF (congestive heart failure)  CVA (cerebral vascular accident)  Seizures  last seizure 10 years ago  HTN (hypertension)  Hyperlipemia  COVID-19 october 2020  Atrial fibrillation  ESRD on dialysis  Tu/Thurs/Sat  Former smoker  History of cocaine use  remote hx, currently sober  H/O aortic dissection  s/p repair (2010), surgery complicated by bowel ischemia s/p bowel resection and ostomy with reversal  H/O aortic valve insufficiency  Mitral regurgitation  GIB (gastrointestinal bleeding)  CAD (coronary artery disease)  s/p PCI 1998  and CABG x 2 11/2020  H/O colectomy  Status post double vessel coronary artery bypass  11/2020 Dr Butler  S/P AVR (aortic valve replacement)  (t)AVR 11/2020 Dr Butler  S/P MVR (mitral valve replacement)  (t)MVR 11/2020 Dr Butler  H/O aortic dissection  Type A; emergent repair 2010  AV fistula  LUE      MEDICATIONS  (STANDING):  chlorhexidine 0.12% Liquid 15 milliLiter(s) Oral Mucosa every 12 hours  chlorhexidine 2% Cloths 1 Application(s) Topical daily  fentaNYL   Infusion 0.5 MICROgram(s)/kG/Hr (4.35 mL/Hr) IV Continuous <Continuous>  levETIRAcetam  IVPB 1000 milliGRAM(s) IV Intermittent every 12 hours  propofol Infusion 20 MICROgram(s)/kG/Min (10.4 mL/Hr) IV Continuous <Continuous>  sodium chloride 0.9%. 1000 milliLiter(s) (75 mL/Hr) IV Continuous <Continuous>    MEDICATIONS  (PRN):    Allergies  penicillin (Other)    SOCIAL HISTORY:  no tob,   no alcohol   no drugs    FAMILY HISTORY:  FH: hypertension    Family history of cardiac disorder (Mother)  mother      ROS: 14 point ROS negative other than what is present in HPI or below    Vital Signs Last 24 Hrs  T(C): 36.9 (23 May 2023 13:00), Max: 37.3 (23 May 2023 06:00)  T(F): 98.4 (23 May 2023 13:00), Max: 99.1 (23 May 2023 06:00)  HR: 66 (23 May 2023 14:00) (42 - 90)  BP: 159/67 (23 May 2023 14:00) (100/53 - 186/98)  BP(mean): 91 (23 May 2023 14:00) (67 - 97)  RR: 15 (23 May 2023 14:00) (11 - 24)  SpO2: 98% (23 May 2023 14:00) (91% - 100%)    Parameters below as of 23 May 2023 13:00  Patient On (Oxygen Delivery Method): room air      General: NAD INCOMPLETE    Detailed Neurologic Exam:    Mental status: The patient is awake and alert and has normal attention span.  The patient is fully oriented in 3 spheres. The patient is oriented to current events. The patient is able to name objects, follow commands, repeat sentences.    Cranial nerves: Pupils equal and react symmetrically to light. There is no visual field deficit to confrontation. Extraocular motion is full with no nystagmus. There is no ptosis. Facial sensation is intact. Facial musculature is symmetric. Palate elevates symmetrically. Tongue is midline.    Motor: There is normal bulk and tone.  There is no tremor.  Strength is 5/5 in the right arm and leg.   Strength is 5/5 in the left arm and leg.    Sensation: Intact to light touch and pin in 4 extremities    Reflexes: 1-2+ throughout and plantar responses are flexor.    Cerebellar: There is no dysmetria on finger to nose testing.    Gait : deferred    NIH SS:  DATE: 5/23/23  TIME: 1430  1A: Level of consciousness (0-3):   1B: Questions (0-2):   1C: Commands (0-2):   2: Gaze (0-2):   3: Visual fields (0-3):   4: Facial palsy (0-3):   MOTOR:  5A: Left arm motor drift (0-4):   5B: Right arm motor drift (0-4):   6A: Left leg motor drift (0-4):   6B: Right leg motor drift (0-4):   7: Limb ataxia (0-2):   SENSORY:  8: Sensation (0-2):   SPEECH:  9: Language (0-3):   10: Dysarthria (0-2):   EXTINCTION:  11: Extinction/inattention (0-2):     TOTAL SCORE:     prehospital mRS=      LABS:                         11.1   10.38 )-----------( 168      ( 23 May 2023 04:40 )             35.2       05-23    139  |  105  |  26.8<H>  ----------------------------<  89  3.4<L>   |  22.0  |  1.90<H>    Ca    8.1<L>      23 May 2023 04:40  Phos  3.5     05-23  Mg     2.1     05-23    TPro  6.8  /  Alb  3.3  /  TBili  0.8  /  DBili  x   /  AST  33  /  ALT  22  /  AlkPhos  90  05-22      PT/INR - ( 23 May 2023 04:40 )   PT: 12.9 sec;   INR: 1.11 ratio         PTT - ( 23 May 2023 04:40 )  PTT:35.5 sec    Lipid panel:    HgbA1c:      RADIOLOGY & ADDITIONAL STUDIES (independently reviewed unless otherwise noted):   Preliminary Note Pending Attending Approval                              [facility] Stroke Consult Note  CC: slurred  HPI:  63yo M w/ PMH aortic dissection s/p repair (2010) now stable, bowel ischemia s/p bowel resection and ostomy with reversal, bio AVR/MVR, HFpEF, afib on Eliquis who presented with AMS. Patient with AMS and facial droop, became combative on arrival to ER and required Versed, shortly after got intubated for airway protection and transferred to ICU for monitoring. Neurosurgery was consulted for acute on chronic SDH. Stroke team called for consult after patient found with slurred speech post extubation.        PAST MEDICAL & SURGICAL HISTORY:  CHF (congestive heart failure)  CVA (cerebral vascular accident)  Seizures  last seizure 10 years ago  HTN (hypertension)  Hyperlipemia  COVID-19 october 2020  Atrial fibrillation  ESRD on dialysis  Tu/Thurs/Sat  Former smoker  History of cocaine use  remote hx, currently sober  H/O aortic dissection  s/p repair (2010), surgery complicated by bowel ischemia s/p bowel resection and ostomy with reversal  H/O aortic valve insufficiency  Mitral regurgitation  GIB (gastrointestinal bleeding)  CAD (coronary artery disease)  s/p PCI 1998  and CABG x 2 11/2020  H/O colectomy  Status post double vessel coronary artery bypass  11/2020 Dr Butler  S/P AVR (aortic valve replacement)  (t)AVR 11/2020 Dr Butler  S/P MVR (mitral valve replacement)  (t)MVR 11/2020 Dr Butler  H/O aortic dissection  Type A; emergent repair 2010  AV fistula  LUE      MEDICATIONS  (STANDING):  chlorhexidine 0.12% Liquid 15 milliLiter(s) Oral Mucosa every 12 hours  chlorhexidine 2% Cloths 1 Application(s) Topical daily  fentaNYL   Infusion 0.5 MICROgram(s)/kG/Hr (4.35 mL/Hr) IV Continuous <Continuous>  levETIRAcetam  IVPB 1000 milliGRAM(s) IV Intermittent every 12 hours  propofol Infusion 20 MICROgram(s)/kG/Min (10.4 mL/Hr) IV Continuous <Continuous>  sodium chloride 0.9%. 1000 milliLiter(s) (75 mL/Hr) IV Continuous <Continuous>    MEDICATIONS  (PRN):    Allergies  penicillin (Other)    SOCIAL HISTORY:  no tob,   no alcohol   no drugs    FAMILY HISTORY:  FH: hypertension    Family history of cardiac disorder (Mother)  mother      ROS: 14 point ROS negative other than what is present in HPI or below    Vital Signs Last 24 Hrs  T(C): 36.9 (23 May 2023 13:00), Max: 37.3 (23 May 2023 06:00)  T(F): 98.4 (23 May 2023 13:00), Max: 99.1 (23 May 2023 06:00)  HR: 66 (23 May 2023 14:00) (42 - 90)  BP: 159/67 (23 May 2023 14:00) (100/53 - 186/98)  BP(mean): 91 (23 May 2023 14:00) (67 - 97)  RR: 15 (23 May 2023 14:00) (11 - 24)  SpO2: 98% (23 May 2023 14:00) (91% - 100%)    Parameters below as of 23 May 2023 13:00  Patient On (Oxygen Delivery Method): room air      General: NAD     Detailed Neurologic Exam:    Mental status: The patient is awake, alert, and oriented to self only. Has normal attention span, making sounds mainly, incomprehensible at times. Requires a lot of prompting.  The patient is able to name objects and follows simple  commands, repeat sentences.    Cranial nerves: Pupils equal and react symmetrically to light. There is no visual field deficit to confrontation. Extraocular motion is full with no nystagmus. There is no ptosis. Facial sensation is intact. Facial musculature is symmetric.     Motor: There is normal bulk and tone.  There is no tremor.  Strength is LUE 4/5 weak, RUE 5/5 no drift                   LLE holds >5 sec, RLE holds <5 sec    Sensation: Intact to light touch in 4 extremities    Cerebellar: There is no dysmetria on finger to nose testing with prompting, weaker on the left     Gait : deferred    NIH SS:  DATE: 5/23/23  TIME: 1430  1A: Level of consciousness (0-3):   1B: Questions (0-2): 1  1C: Commands (0-2):   2: Gaze (0-2):   3: Visual fields (0-3):   4: Facial palsy (0-3):   MOTOR:  5A: Left arm motor drift (0-4):   5B: Right arm motor drift (0-4):   6A: Left leg motor drift (0-4):   6B: Right leg motor drift (0-4): 1  7: Limb ataxia (0-2):   SENSORY:  8: Sensation (0-2):   SPEECH:  9: Language (0-3):   10: Dysarthria (0-2): 1  EXTINCTION:  11: Extinction/inattention (0-2):     TOTAL SCORE: 3    prehospital mRS=      LABS:                         11.1   10.38 )-----------( 168      ( 23 May 2023 04:40 )             35.2       05-23    139  |  105  |  26.8<H>  ----------------------------<  89  3.4<L>   |  22.0  |  1.90<H>    Ca    8.1<L>      23 May 2023 04:40  Phos  3.5     05-23  Mg     2.1     05-23    TPro  6.8  /  Alb  3.3  /  TBili  0.8  /  DBili  x   /  AST  33  /  ALT  22  /  AlkPhos  90  05-22      PT/INR - ( 23 May 2023 04:40 )   PT: 12.9 sec;   INR: 1.11 ratio         PTT - ( 23 May 2023 04:40 )  PTT:35.5 sec    Lipid panel: pending    HgbA1c: pending      RADIOLOGY & ADDITIONAL STUDIES (independently reviewed unless otherwise noted):     TTE Echo Limited or F/U (05.23.23 @ 12:39)   1. Left ventricular ejection fraction, by visual estimation, is 60 to   65%.   2. Normal global left ventricular systolic function.   3. Spectral Doppler shows pseudonormal pattern of left ventricular   myocardial filling (Grade II diastolic dysfunction).   4. Normal left ventricular internal cavity size.   5. Normal right ventricular size and function.   6. Moderately enlarged left atrium.   7. Normal right atrial size.   8. Mitral prosthesis with mild stenosis.   9. Mild to moderate mitral valve regurgitation.  10. Possible mobile echodensity on the posterior mitral valve. Cannot   rule out vegetation. Consider MUSTAPHA for further evaluation.  11. Bioprosthesis in the aortic position demonstrating normal function.  12. Mild tricuspid regurgitation.  13. Dilatation of the aortic root.  14. There is no evidence of pericardial effusion.  15. Estimated pulmonary artery systolic pressure is 42.8 mmHg assuming a   right atrial pressure of 8 mmHg, which is consistent with mild pulmonary   hypertension.       CT Head No Cont (05.23.23 @ 00:15)   IMPRESSION: Stable CT of the brain. No significant interval change of a   left frontal subdural collection.       CT Brain/ Neck  Perfusion Maps Stroke (05.22.23 @ 19:14)     CTA brain:No hemodynamically significant stenosis  CTA carotid/vertebral artery circulation: Aortic dissection extending   into the left proximal subclavian artery, and minimally into the proximal   right subclavian artery/proximal right common carotid artery, is   reidentified without change from prior exam.  There is heavy   calcification of the right carotid bulb and proximal right internal   carotid artery resulting in a focal 40% stenosis at the origin of the   right ICA. There are calcifications ofthe left carotid bulb and proximal   left internal carotid artery without hemodynamically significant stenosis.  CT Perfusion: Core infarct in the right posterior parietal and temporal   lobes, as seen on CTA head. No territory at ischemic risk.                 [facility] Stroke Consult Note  CC: slurred speech/aphasia/AMS  HPI:  63yo M w/ PMH aortic dissection s/p repair (2010) now stable, bowel ischemia s/p bowel resection and ostomy with reversal, bio AVR/MVR, HFpEF, afib on Eliquis who presented with AMS. Patient with AMS and facial droop, became combative on arrival to ER and required Versed, shortly after got intubated for airway protection and transferred to ICU for monitoring. Neurosurgery was consulted for acute on chronic SDH. Stroke team called for consult after patient found with slurred speech and possible aphasia  post extubation.        PAST MEDICAL & SURGICAL HISTORY:  CHF (congestive heart failure)  CVA (cerebral vascular accident)  Seizures  last seizure 10 years ago  HTN (hypertension)  Hyperlipemia  COVID-19 october 2020  Atrial fibrillation  ESRD on dialysis  Tu/Thurs/Sat  Former smoker  History of cocaine use  remote hx, currently sober  H/O aortic dissection  s/p repair (2010), surgery complicated by bowel ischemia s/p bowel resection and ostomy with reversal  H/O aortic valve insufficiency  Mitral regurgitation  GIB (gastrointestinal bleeding)  CAD (coronary artery disease)  s/p PCI 1998  and CABG x 2 11/2020  H/O colectomy  Status post double vessel coronary artery bypass  11/2020 Dr Butler  S/P AVR (aortic valve replacement)  (t)AVR 11/2020 Dr Butler  S/P MVR (mitral valve replacement)  (t)MVR 11/2020 Dr Butler  H/O aortic dissection  Type A; emergent repair 2010  AV fistula  LUE      MEDICATIONS  (STANDING):  chlorhexidine 0.12% Liquid 15 milliLiter(s) Oral Mucosa every 12 hours  chlorhexidine 2% Cloths 1 Application(s) Topical daily  fentaNYL   Infusion 0.5 MICROgram(s)/kG/Hr (4.35 mL/Hr) IV Continuous <Continuous>  levETIRAcetam  IVPB 1000 milliGRAM(s) IV Intermittent every 12 hours  propofol Infusion 20 MICROgram(s)/kG/Min (10.4 mL/Hr) IV Continuous <Continuous>  sodium chloride 0.9%. 1000 milliLiter(s) (75 mL/Hr) IV Continuous <Continuous>    MEDICATIONS  (PRN):    Allergies  penicillin (Other)    SOCIAL HISTORY:  no tob,   no alcohol   no drugs    FAMILY HISTORY:  FH: hypertension    Family history of cardiac disorder (Mother)  mother      ROS: 14 point ROS negative other than what is present in HPI or below    Vital Signs Last 24 Hrs  T(C): 36.9 (23 May 2023 13:00), Max: 37.3 (23 May 2023 06:00)  T(F): 98.4 (23 May 2023 13:00), Max: 99.1 (23 May 2023 06:00)  HR: 66 (23 May 2023 14:00) (42 - 90)  BP: 159/67 (23 May 2023 14:00) (100/53 - 186/98)  BP(mean): 91 (23 May 2023 14:00) (67 - 97)  RR: 15 (23 May 2023 14:00) (11 - 24)  SpO2: 98% (23 May 2023 14:00) (91% - 100%)    Parameters below as of 23 May 2023 13:00  Patient On (Oxygen Delivery Method): room air      General: NAD     Detailed Neurologic Exam:    Mental status: The patient is awake, alert, and oriented to self, year only. Has normal attention span, speech is dysarthric, incomprehensible at times. Requires a lot of prompting.  The patient is able to name objects and follows simple  commands, repeat sentences.    Cranial nerves: Pupils equal and react symmetrically to light. There is no visual field deficit to confrontation. Extraocular motion is full with no nystagmus. There is no ptosis. Facial sensation is intact. Facial musculature is symmetric.     Motor: There is normal bulk and tone.  There is no tremor.  Strength is LUE 4/5 weak, RUE 5/5 no drift                   LLE holds >5 sec, RLE holds <5 sec    Sensation: Intact to light touch in 4 extremities    Cerebellar: There is no dysmetria on finger to nose testing with prompting, weaker on the left     Gait : deferred    NIH SS:  DATE: 5/23/23  TIME: 1430  1A: Level of consciousness (0-3):   1B: Questions (0-2): 1  1C: Commands (0-2):   2: Gaze (0-2):   3: Visual fields (0-3):   4: Facial palsy (0-3):   MOTOR:  5A: Left arm motor drift (0-4):   5B: Right arm motor drift (0-4):   6A: Left leg motor drift (0-4):   6B: Right leg motor drift (0-4): 1  7: Limb ataxia (0-2):   SENSORY:  8: Sensation (0-2):   SPEECH:  9: Language (0-3):   10: Dysarthria (0-2): 1  EXTINCTION:  11: Extinction/inattention (0-2):     TOTAL SCORE: 3          LABS:                         11.1   10.38 )-----------( 168      ( 23 May 2023 04:40 )             35.2       05-23    139  |  105  |  26.8<H>  ----------------------------<  89  3.4<L>   |  22.0  |  1.90<H>    Ca    8.1<L>      23 May 2023 04:40  Phos  3.5     05-23  Mg     2.1     05-23    TPro  6.8  /  Alb  3.3  /  TBili  0.8  /  DBili  x   /  AST  33  /  ALT  22  /  AlkPhos  90  05-22      PT/INR - ( 23 May 2023 04:40 )   PT: 12.9 sec;   INR: 1.11 ratio         PTT - ( 23 May 2023 04:40 )  PTT:35.5 sec    Lipid panel: pending    HgbA1c: pending      RADIOLOGY & ADDITIONAL STUDIES (independently reviewed unless otherwise noted):     TTE Echo Limited or F/U (05.23.23 @ 12:39)   1. Left ventricular ejection fraction, by visual estimation, is 60 to   65%.   2. Normal global left ventricular systolic function.   3. Spectral Doppler shows pseudonormal pattern of left ventricular   myocardial filling (Grade II diastolic dysfunction).   4. Normal left ventricular internal cavity size.   5. Normal right ventricular size and function.   6. Moderately enlarged left atrium.   7. Normal right atrial size.   8. Mitral prosthesis with mild stenosis.   9. Mild to moderate mitral valve regurgitation.  10. Possible mobile echodensity on the posterior mitral valve. Cannot   rule out vegetation. Consider MUSTAPHA for further evaluation.  11. Bioprosthesis in the aortic position demonstrating normal function.  12. Mild tricuspid regurgitation.  13. Dilatation of the aortic root.  14. There is no evidence of pericardial effusion.  15. Estimated pulmonary artery systolic pressure is 42.8 mmHg assuming a   right atrial pressure of 8 mmHg, which is consistent with mild pulmonary   hypertension.       CT Head No Cont (05.23.23 @ 00:15)   IMPRESSION: Stable CT of the brain. No significant interval change of a   left frontal subdural collection.       CT Brain/ Neck  Perfusion Maps Stroke (05.22.23 @ 19:14)     CTA brain:No hemodynamically significant stenosis  CTA carotid/vertebral artery circulation: Aortic dissection extending   into the left proximal subclavian artery, and minimally into the proximal   right subclavian artery/proximal right common carotid artery, is   reidentified without change from prior exam.  There is heavy   calcification of the right carotid bulb and proximal right internal   carotid artery resulting in a focal 40% stenosis at the origin of the   right ICA. There are calcifications ofthe left carotid bulb and proximal   left internal carotid artery without hemodynamically significant stenosis.  CT Perfusion: Core infarct in the right posterior parietal and temporal   lobes, as seen on CTA head. No territory at ischemic risk.

## 2023-05-23 NOTE — PROGRESS NOTE ADULT - ASSESSMENT
61yo M w/ PMH aortic dissection s/p repair (2010) now stable,bowel ischemia s/p bowel resection and ostomy with reversal, bio AVR/MVR, HFpEF, afib on Eliquis who presented with AMS. Found on work up with acute on chronic SDH. Unclear mechanism, presumed fall though timing is uncertain. No other signs of injury on exam, work-up without notable traumatic injuries.     Neuro: Acute on chronic SDH- Eliquis reversed, repeat CT head stable. Encephelopathy and AMS is not supportive by CT head findings. EEG pending. Cont keppra. If negative and patient still with poor GCS - consider EVD, MRI brain.   Avoid sedating med or narcotics for now in order to awake patient.     Card: Unclear if syncope episodes. Unclear if bradycardia new?, continue telemetry. Troponins/ echo pending.     Pulm: Tolerating CPAP. Mental status precludes extubation at this time. ABG and CXR pending     GI: NPO for now    : Hyponatremia - started on NS. Repeat pending.     Edno: BS q6hrs, Keep euthermic, pending TFT's due to hypothermia    ID: No infectious issues identified at this time    Hem/DVT: SCDs only for now, repeat coags    Ext: No traumatic injuries notable. Elevated CPKs all compartments are soft.     Dispo: SICU

## 2023-05-23 NOTE — PROGRESS NOTE ADULT - SUBJECTIVE AND OBJECTIVE BOX
HPI:  62 year old Male w/ extensive medical history including, CHF, ESRD, afib, HTN, MR, s/p PCI /CABG , former smoker, cocaine use, aortic dissection BIBEMS after being found down, on Eliquis. Intubated in ED w/ Marcos for airway protection> neurosurgery consulted for acute on chronic SDH. Patient seen and examined in ED, limited exam 2/2 prop and marcos. Pupils small but reactive, grimacing to pain, not following. Hypertensive in ED. Minimal motor response- unable to fully assess 2/2 paralytic and sedation- will reassess when stabilized.      INTERVAL HPI/OVERNIGHT EVENTS:  62y Male seen & examined with neurosurgery team this AM. Pt. remains intubated/sedated. Pt. not FC, WD all 4 ext.    Vital Signs Last 24 Hrs  T(C): 36.8 (23 May 2023 10:47), Max: 37.3 (23 May 2023 06:00)  T(F): 98.2 (23 May 2023 10:47), Max: 99.1 (23 May 2023 06:00)  HR: 79 (23 May 2023 10:47) (42 - 90)  BP: 118/53 (23 May 2023 10:00) (100/53 - 186/98)  BP(mean): 73 (23 May 2023 10:00) (67 - 97)  RR: 24 (23 May 2023 10:47) (12 - 24)  SpO2: 93% (23 May 2023 10:47) (91% - 100%)    Parameters below as of 23 May 2023 10:54    O2 Flow (L/min): 3    PHYSICAL EXAM:  GENERAL: NAD, well-groomed, well-developed  HEAD:  Atraumatic, normocephalic  DRAINS:   WOUND: Dressing clean dry intact  ROBINA COMA SCORE: E- V- M- =       E: 4= opens eyes spontaneously 3= to voice 2= to noxious 1= no opening       V: 5= oriented 4= confused 3= inappropriate words 2= incomprehensible sounds 1= nonverbal 1T= intubated       M: 6= follows commands 5= localizes 4= withdraws 3= flexor posturing 2= extensor posturing 1= no movement  MENTAL STATUS: AAO x3; Awake/Comatose; Opens eyes spontaneously/to voice/to light touch/to noxious stimuli; Appropriately conversant without aphasia/Nonverbal; following simple commands/mimicking/not following commands  CRANIAL NERVES: Visual acuity normal for age, visual fields full to confrontation, PERRL. EOMI without nystagmus. Facial sensation intact V1-3 distribution b/l. Face symmetric w/ normal eye closure and smile, tongue midline. Hearing grossly intact. Speech clear. Head turning and shoulder shrug intact.   REFLEXES: PERRL. Corneals intact b/l. Gag intact. Cough intact. Oculocephalic reflex intact (Doll's eye). Negative Ng's b/l. Negative clonus b/l  MOTOR: strength 5/5 b/l upper and lower extremities  Uppers     Delt (C5/6)     Bicep (C5/6)     Wrist Extend (C6)     Tricep (C7)     HG (C8/T1)  R                     5/5                 5/5                         5/5                           5/5                   5/5  L                      5/5                 5/5                         5/5                           5/5                   5/5  Lowers      HF(L1/L2)     KE (L3)     DF (L4)     EHL (L5)     PF (S1)      R                     5/5              5/5           5/5           5/5            5/5  L                     5/5               5/5          5/5            5/5            5/5  SENSATION: grossly intact to light touch all extremities  COORDINATION: Gait intact; rapid alternating movements intact; heel to shin intact; no upper extremity dysmetria  CHEST/LUNG: Clear to auscultation bilaterally; no rales, rhonchi, wheezing, or rubs  HEART: +S1/+S2; Regular rate and rhythm; no murmurs, rubs, or gallops  ABDOMEN: Soft, nontender, nondistended; bowel sounds present all four quadrants  EXTREMITIES:  2+ peripheral pulses, no clubbing, cyanosis, or edema  SKIN: Warm, dry; no rashes or lesions    LABS:                        11.1   10.38 )-----------( 168      ( 23 May 2023 04:40 )             35.2         139  |  105  |  26.8<H>  ----------------------------<  89  3.4<L>   |  22.0  |  1.90<H>    Ca    8.1<L>      23 May 2023 04:40  Phos  3.5       Mg     2.1         TPro  6.8  /  Alb  3.3  /  TBili  0.8  /  DBili  x   /  AST  33  /  ALT  22  /  AlkPhos  90      PT/INR - ( 23 May 2023 04:40 )   PT: 12.9 sec;   INR: 1.11 ratio         PTT - ( 23 May 2023 04:40 )  PTT:35.5 sec  Urinalysis Basic - ( 22 May 2023 20:09 )    Color: Yellow / Appearance: Clear / S.010 / pH: x  Gluc: x / Ketone: Negative  / Bili: Negative / Urobili: Negative mg/dL   Blood: x / Protein: 100 / Nitrite: Negative   Leuk Esterase: Negative / RBC: 3-5 /HPF / WBC 0-2 /HPF   Sq Epi: x / Non Sq Epi: x / Bacteria: Occasional         @ 07:  -   @ 07:00  --------------------------------------------------------  IN: 523.8 mL / OUT: 515 mL / NET: 8.8 mL     @ 07:  -   @ 10:58  --------------------------------------------------------  IN: 253.7 mL / OUT: 250 mL / NET: 3.7 mL        RADIOLOGY & ADDITIONAL TESTS:  < from: CT Head No Cont (23 @ 00:15) >  IMPRESSION: Stable CT of the brain. No significant interval change of a   left frontal subdural collection.      < from: CT Brain Stroke Protocol (23 @ 18:56) >  IMPRESSION:    Mixed attenuation subdural hematoma overlying the left frontal lobe,   likely reflecting acute on chronic hemorrhage measuring up to 9 mm in   greatest width. No significant mass effect on underlying parenchyma.    There is a large chronic infarct in the right posterior MCA territory, as   seen on prior exam.    Rest of the chronic findings as above.      Critical value:  I discussed the finding of this report with Dr. Navarro at   7:05 PM on 2023.  Critical value policy of the hospital was   followed.  Read back and confirmation of receipt of this communication   was performed.  This verbal communication supplements the text report of   this document. HPI:  62 year old Male w/ extensive medical history including, CHF, ESRD, afib, HTN, MR, s/p PCI /CABG , former smoker, cocaine use, aortic dissection BIBEMS after being found down, on Eliquis. Intubated in ED w/ Marcos for airway protection> neurosurgery consulted for acute on chronic SDH. Patient seen and examined in ED, limited exam 2/2 prop and marcos. Pupils small but reactive, grimacing to pain, not following. Hypertensive in ED. Minimal motor response- unable to fully assess 2/2 paralytic and sedation- will reassess when stabilized.      INTERVAL HPI/OVERNIGHT EVENTS:  62y Male seen & examined with neurosurgery team this AM. Pt. extubated but nonverbal (grunting) & CORDOVA but not to command.    Vital Signs Last 24 Hrs  T(C): 36.8 (23 May 2023 10:47), Max: 37.3 (23 May 2023 06:00)  T(F): 98.2 (23 May 2023 10:47), Max: 99.1 (23 May 2023 06:00)  HR: 79 (23 May 2023 10:47) (42 - 90)  BP: 118/53 (23 May 2023 10:00) (100/53 - 186/98)  BP(mean): 73 (23 May 2023 10:00) (67 - 97)  RR: 24 (23 May 2023 10:47) (12 - 24)  SpO2: 93% (23 May 2023 10:47) (91% - 100%)    Parameters below as of 23 May 2023 10:54    O2 Flow (L/min): 3    PHYSICAL EXAM:  GENERAL: NAD, well-groomed  HEAD:  vEEG leads in place, normocephalic  ROBINA COMA SCORE: E-4 V-2 M-5  = 11  MENTAL STATUS: Awake; Opens eyes spontaneously; Nonverbal, grunting; not following commands  CRANIAL NERVES: PERRL. Face symmetric.  MOTOR: CORDOVA spontaneously with good strength but not to command   SENSATION: unable to appropriately assess 2/2 current mental status       LABS:                        11.1   10.38 )-----------( 168      ( 23 May 2023 04:40 )             35.2     05-    139  |  105  |  26.8<H>  ----------------------------<  89  3.4<L>   |  22.0  |  1.90<H>    Ca    8.1<L>      23 May 2023 04:40  Phos  3.5       Mg     2.1         TPro  6.8  /  Alb  3.3  /  TBili  0.8  /  DBili  x   /  AST  33  /  ALT  22  /  AlkPhos  90      PT/INR - ( 23 May 2023 04:40 )   PT: 12.9 sec;   INR: 1.11 ratio         PTT - ( 23 May 2023 04:40 )  PTT:35.5 sec  Urinalysis Basic - ( 22 May 2023 20:09 )    Color: Yellow / Appearance: Clear / S.010 / pH: x  Gluc: x / Ketone: Negative  / Bili: Negative / Urobili: Negative mg/dL   Blood: x / Protein: 100 / Nitrite: Negative   Leuk Esterase: Negative / RBC: 3-5 /HPF / WBC 0-2 /HPF   Sq Epi: x / Non Sq Epi: x / Bacteria: Occasional         @ 07: @ 07:00  --------------------------------------------------------  IN: 523.8 mL / OUT: 515 mL / NET: 8.8 mL     @ 07: @ 10:58  --------------------------------------------------------  IN: 253.7 mL / OUT: 250 mL / NET: 3.7 mL        RADIOLOGY & ADDITIONAL TESTS:  < from: CT Head No Cont (23 @ 00:15) >  IMPRESSION: Stable CT of the brain. No significant interval change of a   left frontal subdural collection.      < from: CT Brain Stroke Protocol (23 @ 18:56) >  IMPRESSION:    Mixed attenuation subdural hematoma overlying the left frontal lobe,   likely reflecting acute on chronic hemorrhage measuring up to 9 mm in   greatest width. No significant mass effect on underlying parenchyma.    There is a large chronic infarct in the right posterior MCA territory, as   seen on prior exam.    Rest of the chronic findings as above.      Critical value:  I discussed the finding of this report with Dr. Navarro at   7:05 PM on 2023.  Critical value policy of the hospital was   followed.  Read back and confirmation of receipt of this communication   was performed.  This verbal communication supplements the text report of   this document.

## 2023-05-23 NOTE — CONSULT NOTE ADULT - ASSESSMENT
ASSESSMENT: INCOMPLETE    NEURO: Continue close monitoring for neurologic deterioration, permissive HTN, titrate statin to LDL goal less than 70, MRI Brain w/o, MRA Head w/o and Neck w/contrast. Physical therapy/OT/Speech eval/treatment.     ANTITHROMBOTIC THERAPY:     PULMONARY: CXR clear, protecting airway, saturating well     CARDIOVASCULAR: check TTE, cardiac monitoring                              SBP goal:     GASTROINTESTINAL:  dysphagia screen       Diet:     RENAL: BUN/Cr stable, good urine output      Na Goal: Greater than 135     [place]:    HEMATOLOGY: H/H stable, Platelets normal      DVT ppx: Heparin s.c [] LMWH []     ID: afebrile, no leukocytosis     OTHER:      DISPOSITION: Rehab or home depending on PT eval once stable and workup is complete      CORE MEASURES:        Admission NIHSS:      TPA: [] YES [] NO      LDL/HDL/A1C:     Depression Screen:      Statin Therapy:     Dysphagia Screen: [] PASS [] FAIL     Smoking [] YES [] NO      Afib [] YES [] NO     Stroke Education [] YES [] NO    Obtain screening lower extremity venous ultrasound in patients who meet 1 or more of the following criteria as patient is high risk for DVT/PE on admission:   [] History of DVT/PE  []Hypercoagulable states (Factor V Leiden, Cancer, OCP, etc. )  []Prolonged immobility (hemiplegia/hemiparesis/post operative or any other extended immobilization)  [] Transferred from outside facility (Rehab or Long term care)  [] Age </= to 50 ASSESSMENT: 63yo M w/ PMH aortic dissection s/p repair (2010) now stable, bowel ischemia s/p bowel resection and ostomy with reversal, bio AVR/MVR, HFpEF, afib on Eliquis who presented with AMS. Patient is following by neurosurgery   for acute on chronic SDH. Pt. found to have with slurred speech after being extubated. CTH without significant interval change of a left frontal subdural collection. CTA head with Core infarct in the right posterior parietal and temporal lobes as seen on CTA head. No territory at ischemic risk. Etiology likely cardioembolic, will continue screen for seizures.      NEURO: Continue close monitoring for neurologic deterioration, with stroke checks q 2 hour, permissive HTN, lipid panel, A1C, titrate statin to LDL goal less than 70, MRI Brain w/o, MRA Head w/o , cont EEG, screen for seizures  Physical therapy/OT/Speech eval/treatment.     ANTITHROMBOTIC THERAPY: oh hold at the moment, until cleared by NSICU    PULMONARY:  protecting airway, saturating well on NC 3l/min    CARDIOVASCULAR:  TTE noted, recommend MUSTAPHA, continue cardiac monitoring, cardiology following                             SBP goal: 120-160 mmHg    GASTROINTESTINAL:  dysphagia screen  pending     Diet: NPO    RENAL: BUN/Cr 26.8/1.90, monitor urine output , hydrate as tolerated     Na Goal: Greater than 135     Chapman: N    HEMATOLOGY: H/H 11.1/35.2, Platelets 168, slightly anemic, trend CBC     DVT ppx: Heparin s.c [] LMWH [] SCD's    ID: afebrile, no leukocytosis, monitor for infection      DISPOSITION: Rehab or home depending on PT eval once stable and workup is complete      CORE MEASURES:        Admission NIHSS:      TPA: [] YES [x] NO      LDL/HDL/A1C: pending     Depression Screen: pending     Statin Therapy: pending     Dysphagia Screen: [] PASS [] FAIL pending     Smoking [x] YES [] NO      Afib [x] YES [] NO     Stroke Education [] YES [] NO    Obtain screening lower extremity venous ultrasound in patients who meet 1 or more of the following criteria as patient is high risk for DVT/PE on admission:   [] History of DVT/PE  []Hypercoagulable states (Factor V Leiden, Cancer, OCP, etc. )  []Prolonged immobility (hemiplegia/hemiparesis/post operative or any other extended immobilization)  [] Transferred from outside facility (Rehab or Long term care)  [] Age </= to 50 ASSESSMENT: 63yo M w/ PMH aortic dissection s/p repair (2010) now stable, bowel ischemia s/p bowel resection and ostomy with reversal, bio AVR/MVR, HFpEF, afib on Eliquis who presented with AMS. Patient is following by neurosurgery   for acute on chronic SDH. Pt. found to have with slurred speech after being extubated. CTH without significant interval change of a left frontal subdural collection. CTP head with Core infarct in the right posterior parietal and temporal lobes as seen on CT head. No territory at ischemic risk. Etiology likely cardioembolic, will continue screen for seizures.      NEURO: Continue close monitoring for neurologic deterioration, with stroke checks q 2 hour, permissive HTN, lipid panel, A1C, titrate statin to LDL goal less than 70, MRI Brain w/o, , cont EEG, screen for seizures  Physical therapy/OT/Speech eval/treatment.     ANTITHROMBOTIC THERAPY: on hold at the moment, until cleared by neurosurgery and trauma/SICU    PULMONARY:  protecting airway, saturating well on NC 3l/min    CARDIOVASCULAR:  TTE noted, recommend MUSTAPHA, continue cardiac monitoring, cardiology following                             SBP goal: 120-160 mmHg    GASTROINTESTINAL:  dysphagia screen  pending     Diet: NPO    RENAL: BUN/Cr 26.8/1.90, monitor urine output , hydrate as tolerated     Na Goal: Greater than 135     Chapman: N    HEMATOLOGY: H/H 11.1/35.2, Platelets 168, slightly anemic, trend CBC     DVT ppx: Heparin s.c [] LMWH [] SCD's    ID: afebrile, no leukocytosis, monitor for infection      DISPOSITION: Rehab or home depending on PT eval once stable and workup is complete      CORE MEASURES:        Admission NIHSS:      TNK: [] YES [x] NO      LDL/HDL/A1C: pending     Depression Screen: pending     Statin Therapy: pending     Dysphagia Screen: [] PASS [] FAIL pending     Smoking [x] YES [] NO      Afib [x] YES [] NO     Stroke Education [] YES [] NO    Obtain screening lower extremity venous ultrasound in patients who meet 1 or more of the following criteria as patient is high risk for DVT/PE on admission:   [] History of DVT/PE  []Hypercoagulable states (Factor V Leiden, Cancer, OCP, etc. )  []Prolonged immobility (hemiplegia/hemiparesis/post operative or any other extended immobilization)  [] Transferred from outside facility (Rehab or Long term care)  [] Age </= to 50

## 2023-05-23 NOTE — CHART NOTE - NSCHARTNOTEFT_GEN_A_CORE
EEG preliminary read (not final) on the initial recording hour(s)  Reviewed from: 12:27-18:59    No seizures recorded.  Rare left frontotemporal epileptiform discharges.    Final report to follow tomorrow morning after completion of study.    [facility] EEG Reading Room Ph#: [phone]  Epilepsy Answering Service after 5PM and before 8:30AM: Ph#: [phone]

## 2023-05-23 NOTE — DISCHARGE NOTE NURSING/CASE MANAGEMENT/SOCIAL WORK - PATIENT PORTAL LINK FT
You can access the FollowMyHealth Patient Portal offered by [facility] by registering at the following website: [email or web address]. By joining [employer]’s FollowMyHealth portal, you will also be able to view your health information using other applications (apps) compatible with our system.

## 2023-05-24 DIAGNOSIS — I50.33 ACUTE ON CHRONIC DIASTOLIC (CONGESTIVE) HEART FAILURE: ICD-10-CM

## 2023-05-24 DIAGNOSIS — I48.20 CHRONIC ATRIAL FIBRILLATION, UNSPECIFIED: ICD-10-CM

## 2023-05-24 LAB
ANION GAP SERPL CALC-SCNC: 11 MMOL/L — SIGNIFICANT CHANGE UP (ref 5–17)
BASOPHILS # BLD AUTO: 0.04 K/UL — SIGNIFICANT CHANGE UP (ref 0–0.2)
BASOPHILS NFR BLD AUTO: 0.6 % — SIGNIFICANT CHANGE UP (ref 0–2)
BUN SERPL-MCNC: 17.8 MG/DL — SIGNIFICANT CHANGE UP (ref 8–20)
CALCIUM SERPL-MCNC: 8.4 MG/DL — SIGNIFICANT CHANGE UP (ref 8.4–10.5)
CHLORIDE SERPL-SCNC: 110 MMOL/L — HIGH (ref 96–108)
CO2 SERPL-SCNC: 19 MMOL/L — LOW (ref 22–29)
CREAT SERPL-MCNC: 1.66 MG/DL — HIGH (ref 0.5–1.3)
EGFR: 47 ML/MIN/1.73M2 — LOW
EOSINOPHIL # BLD AUTO: 0.36 K/UL — SIGNIFICANT CHANGE UP (ref 0–0.5)
EOSINOPHIL NFR BLD AUTO: 5.2 % — SIGNIFICANT CHANGE UP (ref 0–6)
GLUCOSE SERPL-MCNC: 88 MG/DL — SIGNIFICANT CHANGE UP (ref 70–99)
HCT VFR BLD CALC: 36.8 % — LOW (ref 39–50)
HGB BLD-MCNC: 11.6 G/DL — LOW (ref 13–17)
IMM GRANULOCYTES NFR BLD AUTO: 0.1 % — SIGNIFICANT CHANGE UP (ref 0–0.9)
LYMPHOCYTES # BLD AUTO: 1.18 K/UL — SIGNIFICANT CHANGE UP (ref 1–3.3)
LYMPHOCYTES # BLD AUTO: 17 % — SIGNIFICANT CHANGE UP (ref 13–44)
MAGNESIUM SERPL-MCNC: 2.1 MG/DL — SIGNIFICANT CHANGE UP (ref 1.6–2.6)
MCHC RBC-ENTMCNC: 27.5 PG — SIGNIFICANT CHANGE UP (ref 27–34)
MCHC RBC-ENTMCNC: 31.5 GM/DL — LOW (ref 32–36)
MCV RBC AUTO: 87.2 FL — SIGNIFICANT CHANGE UP (ref 80–100)
MONOCYTES # BLD AUTO: 0.53 K/UL — SIGNIFICANT CHANGE UP (ref 0–0.9)
MONOCYTES NFR BLD AUTO: 7.6 % — SIGNIFICANT CHANGE UP (ref 2–14)
NEUTROPHILS # BLD AUTO: 4.83 K/UL — SIGNIFICANT CHANGE UP (ref 1.8–7.4)
NEUTROPHILS NFR BLD AUTO: 69.5 % — SIGNIFICANT CHANGE UP (ref 43–77)
PHOSPHATE SERPL-MCNC: 3.1 MG/DL — SIGNIFICANT CHANGE UP (ref 2.4–4.7)
PLATELET # BLD AUTO: 163 K/UL — SIGNIFICANT CHANGE UP (ref 150–400)
POTASSIUM SERPL-MCNC: 3.6 MMOL/L — SIGNIFICANT CHANGE UP (ref 3.5–5.3)
POTASSIUM SERPL-SCNC: 3.6 MMOL/L — SIGNIFICANT CHANGE UP (ref 3.5–5.3)
RBC # BLD: 4.22 M/UL — SIGNIFICANT CHANGE UP (ref 4.2–5.8)
RBC # FLD: 16.6 % — HIGH (ref 10.3–14.5)
SODIUM SERPL-SCNC: 140 MMOL/L — SIGNIFICANT CHANGE UP (ref 135–145)
TROPONIN T SERPL-MCNC: 0.03 NG/ML — SIGNIFICANT CHANGE UP (ref 0–0.06)
WBC # BLD: 6.95 K/UL — SIGNIFICANT CHANGE UP (ref 3.8–10.5)
WBC # FLD AUTO: 6.95 K/UL — SIGNIFICANT CHANGE UP (ref 3.8–10.5)

## 2023-05-24 PROCEDURE — 99233 SBSQ HOSP IP/OBS HIGH 50: CPT

## 2023-05-24 PROCEDURE — 99232 SBSQ HOSP IP/OBS MODERATE 35: CPT | Mod: FS

## 2023-05-24 PROCEDURE — 99223 1ST HOSP IP/OBS HIGH 75: CPT

## 2023-05-24 PROCEDURE — 95813 EEG EXTND MNTR 61-119 MIN: CPT | Mod: 26

## 2023-05-24 PROCEDURE — 99232 SBSQ HOSP IP/OBS MODERATE 35: CPT

## 2023-05-24 RX ORDER — TAMSULOSIN HYDROCHLORIDE 0.4 MG/1
0.4 CAPSULE ORAL AT BEDTIME
Refills: 0 | Status: DISCONTINUED | OUTPATIENT
Start: 2023-05-24 | End: 2023-05-28

## 2023-05-24 RX ORDER — HEPARIN SODIUM 5000 [USP'U]/ML
5000 INJECTION INTRAVENOUS; SUBCUTANEOUS EVERY 8 HOURS
Refills: 0 | Status: DISCONTINUED | OUTPATIENT
Start: 2023-05-24 | End: 2023-05-27

## 2023-05-24 RX ADMIN — LEVETIRACETAM 400 MILLIGRAM(S): 250 TABLET, FILM COATED ORAL at 17:02

## 2023-05-24 RX ADMIN — CHLORHEXIDINE GLUCONATE 1 APPLICATION(S): 213 SOLUTION TOPICAL at 14:16

## 2023-05-24 RX ADMIN — CARVEDILOL PHOSPHATE 12.5 MILLIGRAM(S): 80 CAPSULE, EXTENDED RELEASE ORAL at 05:28

## 2023-05-24 RX ADMIN — Medication 400 MILLIGRAM(S): at 00:06

## 2023-05-24 RX ADMIN — LEVETIRACETAM 400 MILLIGRAM(S): 250 TABLET, FILM COATED ORAL at 05:36

## 2023-05-24 RX ADMIN — HEPARIN SODIUM 5000 UNIT(S): 5000 INJECTION INTRAVENOUS; SUBCUTANEOUS at 21:30

## 2023-05-24 RX ADMIN — CARVEDILOL PHOSPHATE 12.5 MILLIGRAM(S): 80 CAPSULE, EXTENDED RELEASE ORAL at 17:02

## 2023-05-24 RX ADMIN — HEPARIN SODIUM 5000 UNIT(S): 5000 INJECTION INTRAVENOUS; SUBCUTANEOUS at 05:28

## 2023-05-24 RX ADMIN — TAMSULOSIN HYDROCHLORIDE 0.4 MILLIGRAM(S): 0.4 CAPSULE ORAL at 21:30

## 2023-05-24 RX ADMIN — HEPARIN SODIUM 5000 UNIT(S): 5000 INJECTION INTRAVENOUS; SUBCUTANEOUS at 14:16

## 2023-05-24 NOTE — PROGRESS NOTE ADULT - ASSESSMENT
ASSESSMENT: 62 year old male w/ PMH aortic dissection s/p repair (2010), bowel ischemia s/p bowel resection and ostomy with reversal, bio AVR/MVR, HFpEF, afib on Eliquis who presented with AMS. HPI per ED note was that patient. was last seen normal 48hours ago. Family reported that neighbor below his house heard a large thud. Family came to find patient altered and appeared to be sleeping with facial droop and am weakness. Upon arrival in ED was combative and received versed 10mg IM from EMS, Patient was intubated per ED for airway protection, supposedly GCS of 6. Code stroke called and patient went directly to CT, found with acute on chronic SDH.      PLAN:   - No acute neurosurgical intervention is indicated at this time  - Repeat CT head with unchanged subdural collection  - Continue Keppra 1g BID for seizures and VEEG monitoring  - Neurology evaluation for seizures and ?r/o stroke  - DVT PPX: recommend SCDs only; hold AC/AP agents at this time due to increased bleeding risk  - Further care as per primary team  - Neurosurgery to sign off at this time; please reconsult as needed!  - Discussed w/ Dr. Meek

## 2023-05-24 NOTE — PROGRESS NOTE ADULT - ASSESSMENT
63yo M w/ PMH aortic dissection s/p repair (2010) now stable,bowel ischemia s/p bowel resection and ostomy with reversal, bio AVR/MVR, HFpEF, afib on Eliquis who presented with AMS. Found on work up with acute on chronic SDH. Unclear mechanism, presumed fall though timing is uncertain. No other signs of injury on exam, work-up without notable traumatic injuries.     Neuro: Acute on chronic SDH- Eliquis reversed, repeat CT head stable. Aphasia resolved. Possible CVA?  EEG - negative thus far. Cont keppra 1g.   Neurology recommendations appreciated. MRI brain pending.  Added CT cervical spine - read pending.     Card: Unclear if syncope episodes- continue telemetry.     Pulm: No respiratory issues     GI: Dysphagia screening pending. Advance diet.     : SANTOS - improving. Lytes wnl.     Edno: BS q6hrs.     ID: No issues.     Hem/DVT: SCDs SQH started now, now stable CT head x 24hrs. Hold full anticoagulation given head bleed.     Ext: No traumatic injuries notable.     Dispo: Downgrade level of care today  63yo M w/ PMH aortic dissection s/p repair (2010) now stable,bowel ischemia s/p bowel resection and ostomy with reversal, bio AVR/MVR, HFpEF, afib on Eliquis who presented with AMS. Found on work up with acute on chronic SDH. Unclear mechanism, presumed fall though timing is uncertain. No other signs of injury on exam, work-up without notable traumatic injuries.     Neuro: Acute on chronic SDH- Eliquis reversed, repeat CT head stable. Aphasia resolved. Possible CVA?  EEG - negative thus far. Cont keppra 1g.   Neurology recommendations appreciated. MRI brain pending.  Added CT cervical spine - read pending.     Card: Unclear if syncope episodes- continue telemetry.     Pulm: No respiratory issues     GI: Dysphagia screening pending. Advance diet.     : SANTOS - improving. Lytes wnl. TOV today.    Edno: BS q6hrs.     ID: No issues.     Hem/DVT: SCDs SQH started now, now stable CT head x 24hrs. Hold full anticoagulation given head bleed.     Ext: No traumatic injuries notable.     Dispo: Downgrade level of care today

## 2023-05-24 NOTE — CONSULT NOTE ADULT - SUBJECTIVE AND OBJECTIVE BOX
[facility]                                              CARDIOLOGY AT [facility]                                                   [address], [place] [address]                                             Telephone: [phone]. Fax:[phone]                                                         CARDIOLOGY CONSULTATION NOTE                                                                                             Consult requested by:    History obtained by: Patient and medical record  Community Cardiologist:    obtained: Yes [  ] No [  ]  Reason for Consultation:   Avialable out pt records reviewed: Yes [  ] No [  ]    Chief complaint:    Patient is a 62y old  Male who presents with a chief complaint of Found Down, Acute on Chronic SDH (24 May 2023 17:45)        HPI:  61yo M w/ PMH aortic dissection s/p repair () now stable,bowel ischemia s/p bowel resection and ostomy with reversal, bio AVR/MVR, HFpEF, afib on Eliquis who presented with AMS. Found intubated and sedated, no family at bedside. HPI per ED note was that patient. was last seen normal 48hours ago. Family reported that neighbor below his house heard a large thud. Family came to find patient altered and appeared to be sleeping.with facial droop and am weakness. Upon arrival in ED was combative and received versed 10mg IM from EMS, Patient was intubated per ED for airway protection, supposedly GCS of 6. Code stroke called and patient went directly to CT, found with acute on chronic SDH.     (22 May 2023 22:00)        CARDIAC TESTING   ECHO:    STRESS:    CATH:     ELECTROPHYSIOLOGY:       PAST MEDICAL HISTORY  CHF (congestive heart failure)    CVA (cerebral vascular accident)    Chronic kidney disease    Seizures    HTN (hypertension)    Hyperlipemia    COVID-19    Atrial fibrillation    ESRD on dialysis    Former smoker    History of cocaine use    H/O aortic dissection    H/O aortic valve insufficiency    Mitral regurgitation    GIB (gastrointestinal bleeding)    CAD (coronary artery disease)          PAST SURGICAL HISTORY  Aorta disorder    H/O colectomy    Status post double vessel coronary artery bypass    S/P AVR (aortic valve replacement)    S/P MVR (mitral valve replacement)    H/O aortic dissection    AV fistula          SOCIAL HISTORY:  Denies smoking/alcohol/drugs  CIGARETTES:     ALCOHOL:  DRUGS:      FAMILY HISTORY:  FAMILY HISTORY:  FH: hypertension    Family history of cardiac disorder (Mother)  mother        Family History of Cardiovascular Disease:  Yes [  ] No [  ]  Coronary Artery Disease in first degree relative: Yes [  ] No [  ]  Sudden Cardiac Death in First degree relative: Yes [  ] No [  ]      HOME MEDICATIONS:      CURRENT MEDICATIONS:  carvedilol 12.5 milliGRAM(s) Oral every 12 hours     levETIRAcetam  IVPB  chlorhexidine 2% Cloths  heparin   Injectable  tamsulosin        ALLERGIES: Allergies    penicillin (Other)    Intolerances          REVIEW OF SYMPTOMS:   CONSTITUTIONAL: No fever, no chills, no weight loss, no weight gain, no fatigue   ENMT:  No vertigo; No sinus or throat pain  NECK: No pain or stiffness  CARDIOVASCULAR: No chest pain, no dyspnea, no syncope/presyncope, no palpitations, no dizziness, no Orthopnea, no Paroxsymal nocturnal dyspnea  RESPIRATORY: no Shortness of breath, no cough, no wheezing  : No dysuria, no hematuria   GI: No dark color stool, no nausea, no diarrhea, no constipation, no abdominal pain   NEURO: No headache, no slurred speech   MUSCULOSKELETAL: No joint pain or swelling; No muscle, back, or extremity pain  PSYCH: No agitation, no anxiety.    ALL OTHER REVIEW OF SYSTEMS ARE NEGATIVE.      Vital Signs Last 24 Hrs  T(C): 36.8 (24 May 2023 15:58), Max: 37.5 (23 May 2023 19:00)  T(F): 98.3 (24 May 2023 15:58), Max: 99.5 (23 May 2023 19:00)  HR: 66 (24 May 2023 18:00) (58 - 68)  BP: 151/62 (24 May 2023 18:00) (125/68 - 160/69)  BP(mean): 86 (24 May 2023 18:00) (73 - 115)  RR: 17 (24 May 2023 18:00) (15 - 24)  SpO2: 94% (24 May 2023 18:00) (84% - 100%)      INTAKE AND OUTPUT:    @ 07:  -   @ 07:00  --------------------------------------------------------  IN: 1969.1 mL / OUT: 2125 mL / NET: -155.9 mL     @ 07:  -   @ 18:20  --------------------------------------------------------  IN: 995 mL / OUT: 200 mL / NET: 795 mL        PHYSICAL EXAM:  Constitutional: Comfortable . No acute distress.   HEENT: Atraumatic and normocephalic , neck is supple . no JVD. No carotid bruit.  CNS: A&Ox3. No focal deficits.   Respiratory: CTAB, unlabored   Cardiovascular: RRR normal s1 s2. No murmur. No rubs or gallop.  Gastrointestinal: Soft, non-tender. +Bowel sounds.   MSK: full ROM extremities x 4  Extremities: No edema. No cyanosis   Psychiatric: Calm . no agitation.   Skin: Warm and dry, no ulcers on extremities       LABS:                        11.6   6.95  )-----------( 163      ( 24 May 2023 04:00 )             36.8     05-24    140  |  110<H>  |  17.8  ----------------------------<  88  3.6   |  19.0<L>  |  1.66<H>    Ca    8.4      24 May 2023 04:00  Phos  3.1     05-24  Mg     2.1     05-24    TPro  6.8  /  Alb  3.3  /  TBili  0.8  /  DBili  x   /  AST  33  /  ALT  22  /  AlkPhos  90  05-22    CARDIAC MARKERS ( 24 May 2023 04:00 )  x     / 0.03 ng/mL / x     / x     / x      CARDIAC MARKERS ( 23 May 2023 17:52 )  x     / x     / 3268 U/L / x     / 8.0 ng/mL  CARDIAC MARKERS ( 23 May 2023 04:40 )  x     / x     / 3435 U/L / x     / 11.2 ng/mL  CARDIAC MARKERS ( 22 May 2023 19:30 )  x     / 0.03 ng/mL / 2472 U/L / x     / 6.0 ng/mL    ;p-BNP=  PT/INR - ( 23 May 2023 04:40 )   PT: 12.9 sec;   INR: 1.11 ratio         PTT - ( 23 May 2023 04:40 )  PTT:35.5 sec  Urinalysis Basic - ( 22 May 2023 20:09 )    Color: Yellow / Appearance: Clear / S.010 / pH: x  Gluc: x / Ketone: Negative  / Bili: Negative / Urobili: Negative mg/dL   Blood: x / Protein: 100 / Nitrite: Negative   Leuk Esterase: Negative / RBC: 3-5 /HPF / WBC 0-2 /HPF   Sq Epi: x / Non Sq Epi: x / Bacteria: Occasional          INTERPRETATION OF TELEMETRY:     ECG:   Prior ECG: Yes [  ] No [  ]      RADIOLOGY & ADDITIONAL STUDIES:    X-ray:  reviewed by me.   CT scan:   MRI:   US:                                                [facility]                                              CARDIOLOGY AT [facility]                                                   [address], [place] [address]                                             Telephone: [phone]. Fax:[phone]                                                         CARDIOLOGY CONSULTATION NOTE                                                                                             Consult requested by:  Dr. Roberts  History obtained by: Patient and medical record  Community Cardiologist:  Dr. Frankel   obtained: Yes [  ] No [x  ]  Reason for Consultation: r/o vegetation  Available out pt records reviewed: Yes [x ]    This is a 63yo M w/ PMH aortic dissection s/p repair () now stable, bowel ischemia s/p bowel resection and ostomy with reversal, bio AVR/MVR, HFpEF, afib on Eliquis who presented with AMS. Found intubated and sedated, no family at bedside. HPI per ED note was that patient. was last seen normal 48hours ago. Family reported that neighbor below his house heard a large thud. Family came to find patient altered and appeared to be sleeping.with facial droop and am weakness. Patient was diagnosed with subdural hematoma.  We were asked to see patient for Possible mobile echodensity on the posterior mitral valve. Cannot r/o endocarditis.  Blood cultures have been negative  rule out vegetation.     CARDIAC TESTING   ECHO:  < from: TTE Echo Limited or F/U (23 @ 12:39) >  Summary:   1. Left ventricular ejection fraction, by visual estimation, is 60 to   65%.   2. Normal global left ventricular systolic function.   3. Spectral Doppler shows pseudonormal pattern of left ventricular   myocardial filling (Grade II diastolic dysfunction).   4. Normal left ventricular internal cavity size.   5. Normal right ventricular size and function.   6. Moderately enlarged left atrium.   7. Normal right atrial size.   8. Mitral prosthesis with mild stenosis.   9. Mild to moderate mitral valve regurgitation.  10. Possible mobile echodensity on the posterior mitral valve. Cannot   rule out vegetation. Consider MUSTAPHA for further evaluation.  11. Bioprosthesis in the aortic position demonstrating normal function.  12. Mild tricuspid regurgitation.  13. Dilatation of the aortic root.  14. There is no evidence of pericardial effusion.  15. Estimated pulmonary artery systolic pressure is 42.8 mmHg assuming a   right atrial pressure of 8 mmHg, which is consistent with mild pulmonary   hypertension.    STRESS:    CATH: < from: Cardiac Cath Lab - Adult (11.10.20 @ 07:43) >  VENTRICLES: EF estimated was 50 %.  VALVES: AORTIC VALVE: There was moderate to severe aortic insufficiency.  MITRAL VALVE: The mitral valve exhibited moderate to severe regurgitation.  CORONARY VESSELS: The coronary circulation is right dominant.  LM:   --  LM: The left anterior descending and circumflex arteries arose  anomalously from separate ostia.  LAD:   --  LAD: Angiography showed moderate atherosclerosis.  CX:   --  Proximal circumflex: There was a discrete 60 % stenosis.  --  OM1: The vessel was small to medium sized. There was a discrete 80 %  stenosis. The lesion was irregularly contoured and eccentric.  --  OM2: The vessel was medium to large sized. There was a discrete 80 %  stenosis. The lesion was irregularly contoured, eccentric, and heavily  calcified.  RCA:   --  Proximal RCA: There was a 100 % stenosis. This lesion is a  chronic total occlusion. The distal vessel fills via collaterals from the  LCA.  AORTA: The root exhibited dilatation. Ascending aorta: The segment was  normal in size. Graft repair intact. Aortic arch: Normal. Known residual  Type B dissection by CTA. Descending aorta: The segment was normal in  size. Known residual Type B dissection by CTA.  ARCH VESSELS: Innominate: Known residual Type B dissection by CTA. Proximal  left subclavian: Known residual Type B dissection by CTA. LIMA: Normal.  TAMMY: Normal.  COMPLICATIONS: No complications occurred during the cath lab visit.  SUMMARY:  Summary: Addendum 20: Case reconfirmed to remove Bypass Graph Study,  duplicate Left Subclavian procedure,change contrast from 1665 to 165 and  add Inominate Angiography procedure  DIAGNOSTIC IMPRESSIONS: - Patent ascending aortic graft  - Severe 2 vessel CAD with chronically occluded proximal RCA. There are   brisk collaterals from the LCA.  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, subclavian   artery, arch, and descending aorta  - Low normal LV systolic function with moderate to severe AI and MR (best   seen on echo)  - The IMAs are both patent    PAST MEDICAL HISTORY  CHF (congestive heart failure)  CVA (cerebral vascular accident)  Chronic kidney disease  Seizures  HTN (hypertension)  Hyperlipemia  COVID-19  Atrial fibrillation  ESRD on dialysis  Former smoker  History of cocaine use  H/O aortic dissection  H/O aortic valve insufficiency  Mitral regurgitation  GIB (gastrointestinal bleeding)  CAD (coronary artery disease)    PAST SURGICAL HISTORY  Aorta disorder  H/O colectomy  Status post double vessel coronary artery bypass  S/P AVR (aortic valve replacement)  S/P MVR (mitral valve replacement)  AV fistula    SOCIAL HISTORY:    CIGARETTES:   No  ALCOHOL:  NO  DRUGS:  previous cocaine use      FAMILY HISTORY:  FAMILY HISTORY:  FH: hypertension    Family History of Cardiovascular Disease:  Yes [  ] No [  ]  Coronary Artery Disease in first degree relative: Yes [  ] No [  ]  Sudden Cardiac Death in First degree relative: Yes [  ] No [  ]      HOME MEDICATIONS:      CURRENT MEDICATIONS:  carvedilol 12.5 milliGRAM(s) Oral every 12 hours  levETIRAcetam  IVPB  heparin   Injectable  tamsulosin    ALLERGIES: pcn    REVIEW OF SYMPTOMS:   CONSTITUTIONAL: No fever, no chills, no weight loss, no weight gain, no fatigue   ENMT:  No vertigo; No sinus or throat pain  NECK: No pain or stiffness  CARDIOVASCULAR: No chest pain, no dyspnea, no syncope/presyncope, no palpitations, no dizziness, no Orthopnea, no Paroxsymal nocturnal dyspnea  RESPIRATORY: no Shortness of breath, no cough, no wheezing  : No dysuria, no hematuria   GI: No dark color stool, no nausea, no diarrhea, no constipation, no abdominal pain   NEURO: No headache, no slurred speech   MUSCULOSKELETAL: No joint pain or swelling; No muscle, back, or extremity pain  PSYCH: No agitation, no anxiety.    ALL OTHER REVIEW OF SYSTEMS ARE NEGATIVE.    Vital Signs Last 24 Hrs  T(C): 36.8 (24 May 2023 15:58), Max: 37.5 (23 May 2023 19:00)  T(F): 98.3 (24 May 2023 15:58), Max: 99.5 (23 May 2023 19:00)  HR: 66 (24 May 2023 18:00) (58 - 68)  BP: 151/62 (24 May 2023 18:00) (125/68 - 160/69)  BP(mean): 86 (24 May 2023 18:00) (73 - 115)  RR: 17 (24 May 2023 18:00) (15 - 24)  SpO2: 94% (24 May 2023 18:00) (84% - 100%)      INTAKE AND OUTPUT:    @ 07:01  -   @ 07:00  --------------------------------------------------------  IN: 1969.1 mL / OUT: 2125 mL / NET: -155.9 mL     @ 07:01  -   @ 18:20  --------------------------------------------------------  IN: 995 mL / OUT: 200 mL / NET: 795 mL    PHYSICAL EXAM:  Constitutional: Comfortable . No acute distress.   HEENT: Atraumatic and normocephalic , neck is supple . no JVD. No carotid bruit.  CNS: A&Ox3. No focal deficits.   Respiratory: CTAB, unlabored   Cardiovascular: RRR normal s1 s2. No murmur. No rubs or gallop.  Gastrointestinal: Soft, non-tender. +Bowel sounds.   MSK: full ROM extremities x 4  Extremities: No edema. No cyanosis   Psychiatric: Calm . no agitation.   Skin: Warm and dry, no ulcers on extremities       LABS:                        11.6   6.95  )-----------( 163      ( 24 May 2023 04:00 )             36.8         140  |  110<H>  |  17.8  ----------------------------<  88  3.6   |  19.0<L>  |  1.66<H>    Ca    8.4      24 May 2023 04:00  Phos  3.1       Mg     2.1         TPro  6.8  /  Alb  3.3  /  TBili  0.8  /  DBili  x   /  AST  33  /  ALT  22  /  AlkPhos  90      CARDIAC MARKERS ( 24 May 2023 04:00 )  x     / 0.03 ng/mL / x     / x     / x      CARDIAC MARKERS ( 23 May 2023 17:52 )  x     / x     / 3268 U/L / x     / 8.0 ng/mL  CARDIAC MARKERS ( 23 May 2023 04:40 )  x     / x     / 3435 U/L / x     / 11.2 ng/mL  CARDIAC MARKERS ( 22 May 2023 19:30 )  x     / 0.03 ng/mL / 2472 U/L / x     / 6.0 ng/mL    ;p-BNP=  PT/INR - ( 23 May 2023 04:40 )   PT: 12.9 sec;   INR: 1.11 ratio         PTT - ( 23 May 2023 04:40 )  PTT:35.5 sec  Urinalysis Basic - ( 22 May 2023 20:09 )    Color: Yellow / Appearance: Clear / S.010 / pH: x  Gluc: x / Ketone: Negative  / Bili: Negative / Urobili: Negative mg/dL   Blood: x / Protein: 100 / Nitrite: Negative   Leuk Esterase: Negative / RBC: 3-5 /HPF / WBC 0-2 /HPF   Sq Epi: x / Non Sq Epi: x / Bacteria: Occasional    INTERPRETATION OF TELEMETRY:     ECG: NSR WNL  Prior ECG: Yes [  ] No [  ]      RADIOLOGY & ADDITIONAL STUDIES:   IMPRESSION: Initial right perihilar infiltrate has improved. Tubes in place as above                                                [facility]                                              CARDIOLOGY AT [facility]                                                   [address], [place] [address]                                             Telephone: [phone]. Fax:[phone]                                                         CARDIOLOGY CONSULTATION NOTE                                                                                             Consult requested by:  Dr. Roberts  History obtained by: Patient and medical record  Community Cardiologist:  Dr. Frankel   obtained: Yes [  ] No [x  ]  Reason for Consultation: r/o vegetation  Available out pt records reviewed: Yes [x ]    This is a 63yo M w/ PMH aortic dissection s/p repair () now stable, bowel ischemia s/p bowel resection and ostomy with reversal, bio AVR/MVR, HFpEF, afib on Eliquis who presented with AMS. Found intubated and sedated, no family at bedside. HPI per ED note was that patient. was last seen normal 48hours ago. Family reported that neighbor below his house heard a large thud. Family came to find patient altered and appeared to be sleeping.with facial droop and am weakness. Patient was diagnosed with intracranial  hematoma.  We were asked to see patient for Possible mobile echodensity on the posterior mitral valve. Cannot r/o endocarditis.  Blood cultures have been negative  rule out vegetation.     CARDIAC TESTING   ECHO:  < from: TTE Echo Limited or F/U (23 @ 12:39) >  Summary:   1. Left ventricular ejection fraction, by visual estimation, is 60 to   65%.   2. Normal global left ventricular systolic function.   3. Spectral Doppler shows pseudonormal pattern of left ventricular   myocardial filling (Grade II diastolic dysfunction).   4. Normal left ventricular internal cavity size.   5. Normal right ventricular size and function.   6. Moderately enlarged left atrium.   7. Normal right atrial size.   8. Mitral prosthesis with mild stenosis.   9. Mild to moderate mitral valve regurgitation.  10. Possible mobile echodensity on the posterior mitral valve. Cannot   rule out vegetation. Consider MUSTAPHA for further evaluation.  11. Bioprosthesis in the aortic position demonstrating normal function.  12. Mild tricuspid regurgitation.  13. Dilatation of the aortic root.  14. There is no evidence of pericardial effusion.  15. Estimated pulmonary artery systolic pressure is 42.8 mmHg assuming a   right atrial pressure of 8 mmHg, which is consistent with mild pulmonary   hypertension.    STRESS:    CATH: < from: Cardiac Cath Lab - Adult (11.10.20 @ 07:43) >  VENTRICLES: EF estimated was 50 %.  VALVES: AORTIC VALVE: There was moderate to severe aortic insufficiency.  MITRAL VALVE: The mitral valve exhibited moderate to severe regurgitation.  CORONARY VESSELS: The coronary circulation is right dominant.  LM:   --  LM: The left anterior descending and circumflex arteries arose  anomalously from separate ostia.  LAD:   --  LAD: Angiography showed moderate atherosclerosis.  CX:   --  Proximal circumflex: There was a discrete 60 % stenosis.  --  OM1: The vessel was small to medium sized. There was a discrete 80 %  stenosis. The lesion was irregularly contoured and eccentric.  --  OM2: The vessel was medium to large sized. There was a discrete 80 %  stenosis. The lesion was irregularly contoured, eccentric, and heavily  calcified.  RCA:   --  Proximal RCA: There was a 100 % stenosis. This lesion is a  chronic total occlusion. The distal vessel fills via collaterals from the  LCA.  AORTA: The root exhibited dilatation. Ascending aorta: The segment was  normal in size. Graft repair intact. Aortic arch: Normal. Known residual  Type B dissection by CTA. Descending aorta: The segment was normal in  size. Known residual Type B dissection by CTA.  ARCH VESSELS: Innominate: Known residual Type B dissection by CTA. Proximal  left subclavian: Known residual Type B dissection by CTA. LIMA: Normal.  TAMMY: Normal.  COMPLICATIONS: No complications occurred during the cath lab visit.  SUMMARY:  Summary: Addendum 20: Case reconfirmed to remove Bypass Graph Study,  duplicate Left Subclavian procedure,change contrast from 1665 to 165 and  add Inominate Angiography procedure  DIAGNOSTIC IMPRESSIONS: - Patent ascending aortic graft  - Severe 2 vessel CAD with chronically occluded proximal RCA. There are   brisk collaterals from the LCA.  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, subclavian   artery, arch, and descending aorta  - Low normal LV systolic function with moderate to severe AI and MR (best   seen on echo)  - The IMAs are both patent    PAST MEDICAL HISTORY  CHF (congestive heart failure)  CVA (cerebral vascular accident)  Chronic kidney disease  Seizures  HTN (hypertension)  Hyperlipemia  COVID-19  Atrial fibrillation  ESRD on dialysis  Former smoker  History of cocaine use  H/O aortic dissection  H/O aortic valve insufficiency  Mitral regurgitation  GIB (gastrointestinal bleeding)  CAD (coronary artery disease)    PAST SURGICAL HISTORY  Aorta disorder  H/O colectomy  Status post double vessel coronary artery bypass  S/P AVR (aortic valve replacement)  S/P MVR (mitral valve replacement)  AV fistula    SOCIAL HISTORY:    CIGARETTES:   No  ALCOHOL:  NO  DRUGS:  previous cocaine use      FAMILY HISTORY:  FAMILY HISTORY:  FH: hypertension    Family History of Cardiovascular Disease:  Yes [  ] No [  ]  Coronary Artery Disease in first degree relative: Yes [  ] No [  ]  Sudden Cardiac Death in First degree relative: Yes [  ] No [  ]      HOME MEDICATIONS:   · amLODIPine Besylate 10 MG Oral Tablet; TAKE 1 TABLET DAILY   · Aspirin 81 81 MG TBEC; TAKE 1 TABLET BY MOUTH EVERY DAY   · Atorvastatin Calcium 40 MG Oral Tablet; TAKE 1 TABLET AT BEDTIME   · Carvedilol 25 MG Oral Tablet; TAKE 1 TABLET TWICE DAILY WITH MEALS    · Eliquis 5 MG Oral Tablet; TAKE 1 TABLET TWICE A DAY WITH FOOD   · Sodium Bicarbonate 650 MG Oral Tablet; TAKE 2 TABLET Twice daily   · Tamsulosin HCl - 0.4 MG Oral Capsule; TAKE 1 CAPSULE Daily   · Torsemide 20 MG Oral Tablet; take 1 tablet every other day    CURRENT MEDICATIONS:  carvedilol 12.5 milliGRAM(s) Oral every 12 hours  levETIRAcetam  IVPB  heparin   Injectable  tamsulosin    ALLERGIES: pcn    REVIEW OF SYMPTOMS:   CONSTITUTIONAL: No fever, no chills, no weight loss, no weight gain, no fatigue   ENMT:  No vertigo; No sinus or throat pain  NECK: No pain or stiffness  CARDIOVASCULAR: No chest pain, no dyspnea, no syncope/presyncope, no palpitations, no dizziness, no Orthopnea, no Paroxsymal nocturnal dyspnea  RESPIRATORY: no Shortness of breath, no cough, no wheezing  : No dysuria, no hematuria   GI: No dark color stool, no nausea, no diarrhea, no constipation, no abdominal pain   NEURO: No headache, no slurred speech   MUSCULOSKELETAL: No joint pain or swelling; No muscle, back, or extremity pain  PSYCH: No agitation, no anxiety.    ALL OTHER REVIEW OF SYSTEMS ARE NEGATIVE.    Vital Signs Last 24 Hrs  T(C): 36.8 (24 May 2023 15:58), Max: 37.5 (23 May 2023 19:00)  T(F): 98.3 (24 May 2023 15:58), Max: 99.5 (23 May 2023 19:00)  HR: 66 (24 May 2023 18:00) (58 - 68)  BP: 151/62 (24 May 2023 18:00) (125/68 - 160/69)  BP(mean): 86 (24 May 2023 18:00) (73 - 115)  RR: 17 (24 May 2023 18:00) (15 - 24)  SpO2: 94% (24 May 2023 18:00) (84% - 100%)      INTAKE AND OUTPUT:    @ 07:01  -   @ 07:00  --------------------------------------------------------  IN: 1969.1 mL / OUT: 2125 mL / NET: -155.9 mL     @ 07:01  -   @ 18:20  --------------------------------------------------------  IN: 995 mL / OUT: 200 mL / NET: 795 mL    PHYSICAL EXAM:  Constitutional: Comfortable . No acute distress.   HEENT: Atraumatic and normocephalic , neck is supple . no JVD. No carotid bruit.  CNS: A&Ox3. No focal deficits.   Respiratory: CTAB, unlabored   Cardiovascular: RRR normal s1 s22/6 aisha lsb  Gastrointestinal: Soft, non-tender. +Bowel sounds.   MSK: full ROM extremities x 4  Extremities: No edema. No cyanosis   Psychiatric: Calm . no agitation.   Skin: Warm and dry, no ulcers on extremities       LABS:                        11.6   6.95  )-----------( 163      ( 24 May 2023 04:00 )             36.8     05-24    140  |  110<H>  |  17.8  ----------------------------<  88  3.6   |  19.0<L>  |  1.66<H>    Ca    8.4      24 May 2023 04:00  Phos  3.1     24  Mg     2.1     24    TPro  6.8  /  Alb  3.3  /  TBili  0.8  /  DBili  x   /  AST  33  /  ALT  22  /  AlkPhos  90  05-22    CARDIAC MARKERS ( 24 May 2023 04:00 )  x     / 0.03 ng/mL / x     / x     / x      CARDIAC MARKERS ( 23 May 2023 17:52 )  x     / x     / 3268 U/L / x     / 8.0 ng/mL  CARDIAC MARKERS ( 23 May 2023 04:40 )  x     / x     / 3435 U/L / x     / 11.2 ng/mL  CARDIAC MARKERS ( 22 May 2023 19:30 )  x     / 0.03 ng/mL / 2472 U/L / x     / 6.0 ng/mL    ;p-BNP=  PT/INR - ( 23 May 2023 04:40 )   PT: 12.9 sec;   INR: 1.11 ratio         PTT - ( 23 May 2023 04:40 )  PTT:35.5 sec  Urinalysis Basic - ( 22 May 2023 20:09 )    Color: Yellow / Appearance: Clear / S.010 / pH: x  Gluc: x / Ketone: Negative  / Bili: Negative / Urobili: Negative mg/dL   Blood: x / Protein: 100 / Nitrite: Negative   Leuk Esterase: Negative / RBC: 3-5 /HPF / WBC 0-2 /HPF   Sq Epi: x / Non Sq Epi: x / Bacteria: Occasional    INTERPRETATION OF TELEMETRY:     ECG: NSR WNL  Prior ECG: Yes [  ] No [  ]      RADIOLOGY & ADDITIONAL STUDIES:   IMPRESSION: Initial right perihilar infiltrate has improved. Tubes in place as above

## 2023-05-24 NOTE — PROGRESS NOTE ADULT - SUBJECTIVE AND OBJECTIVE BOX
Preliminary note, offical recommendations pending attending review/signature                                  [facility] Stroke Consult Note    CC: slurred speech/aphasia/AMS  HPI:  63yo M w/ PMH aortic dissection s/p repair (2010) now stable, bowel ischemia s/p bowel resection and ostomy with reversal, bio AVR/MVR, HFpEF, afib on Eliquis who presented with AMS. Patient with AMS and facial droop, became combative on arrival to ER and required Versed, shortly after got intubated for airway protection and transferred to ICU for monitoring. Neurosurgery was consulted for acute on chronic SDH. Stroke team called for consult after patient found with slurred speech and possible aphasia  post extubation.      SUBJECTIVE: No events overnight.  No new neurologic complaints.  ROS reported negative unless otherwise noted.    atorvastatin 40 milliGRAM(s) Oral at bedtime  carvedilol 12.5 milliGRAM(s) Oral every 12 hours  chlorhexidine 2% Cloths 1 Application(s) Topical daily  heparin   Injectable 5000 Unit(s) SubCutaneous every 8 hours  levETIRAcetam  IVPB 1000 milliGRAM(s) IV Intermittent every 12 hours  sodium chloride 0.9%. 1000 milliLiter(s) IV Continuous <Continuous>      PHYSICAL EXAM:   Vital Signs Last 24 Hrs  T(C): 36.4 (24 May 2023 08:00), Max: 37.6 (23 May 2023 18:00)  T(F): 97.5 (24 May 2023 08:00), Max: 99.7 (23 May 2023 18:00)  HR: 59 (24 May 2023 08:00) (55 - 79)  BP: 150/69 (24 May 2023 08:00) (118/53 - 159/67)  BP(mean): 89 (24 May 2023 08:00) (73 - 93)  RR: 20 (24 May 2023 08:00) (11 - 24)  SpO2: 96% (24 May 2023 08:00) (91% - 100%)    Parameters below as of 23 May 2023 16:00  Patient On (Oxygen Delivery Method): room air        General: No acute distress INCOMPLETE    NEUROLOGICAL EXAM:  Mental status: Awake, alert, oriented x3, speech fluent, follows commands, no neglect, normal memory   Cranial Nerves: No facial asymmetry, no nystagmus, no dysarthria,  tongue midline  Motor exam: Normal tone, no drift, 5/5 RUE, 5/5 RLE, 5/5 LUE, 5/5 LLE, normal fine finger movements.  Sensation: Intact to light touch   Coordination/ Gait: No dysmetria, gait not tested    LABS:                        11.6   6.95  )-----------( 163      ( 24 May 2023 04:00 )             36.8    05-24    140  |  110<H>  |  17.8  ----------------------------<  88  3.6   |  19.0<L>  |  1.66<H>    Ca    8.4      24 May 2023 04:00  Phos  3.1     05-24  Mg     2.1     05-24    TPro  6.8  /  Alb  3.3  /  TBili  0.8  /  DBili  x   /  AST  33  /  ALT  22  /  AlkPhos  90  05-22  PT/INR - ( 23 May 2023 04:40 )   PT: 12.9 sec;   INR: 1.11 ratio         PTT - ( 23 May 2023 04:40 )  PTT:35.5 sec    LDL/ HDL= pending   A1C= Pending        IMAGING: Reviewed by me.     TTE Echo Limited or F/U (05.23.23 @ 12:39)   1. Left ventricular ejection fraction, by visual estimation, is 60 to   65%.   2. Normal global left ventricular systolic function.   3. Spectral Doppler shows pseudonormal pattern of left ventricular   myocardial filling (Grade II diastolic dysfunction).   4. Normal left ventricular internal cavity size.   5. Normal right ventricular size and function.   6. Moderately enlarged left atrium.   7. Normal right atrial size.   8. Mitral prosthesis with mild stenosis.   9. Mild to moderate mitral valve regurgitation.  10. Possible mobile echodensity on the posterior mitral valve. Cannot   rule out vegetation. Consider MUSTAPHA for further evaluation.  11. Bioprosthesis in the aortic position demonstrating normal function.  12. Mild tricuspid regurgitation.  13. Dilatation of the aortic root.  14. There is no evidence of pericardial effusion.  15. Estimated pulmonary artery systolic pressure is 42.8 mmHg assuming a   right atrial pressure of 8 mmHg, which is consistent with mild pulmonary   hypertension.      CT Head No Cont (05.23.23 @ 00:15)   IMPRESSION: Stable CT of the brain. No significant interval change of a   left frontal subdural collection.    CT Brain/ Neck  Perfusion Maps Stroke (05.22.23 @ 19:14)     CTA brain:No hemodynamically significant stenosis  CTA carotid/vertebral artery circulation: Aortic dissection extending   into the left proximal subclavian artery, and minimally into the proximal   right subclavian artery/proximal right common carotid artery, is   reidentified without change from prior exam.  There is heavy   calcification of the right carotid bulb and proximal right internal   carotid artery resulting in a focal 40% stenosis at the origin of the   right ICA. There are calcifications ofthe left carotid bulb and proximal   left internal carotid artery without hemodynamically significant stenosis.  CT Perfusion: Core infarct in the right posterior parietal and temporal   lobes, as seen on CTA head. No territory at ischemic risk.     Preliminary note, official recommendations pending attending review/signature                                  [facility] Stroke Consult Note    CC: slurred speech/aphasia/AMS  HPI:  61yo M w/ PMH aortic dissection s/p repair (2010) now stable, bowel ischemia s/p bowel resection and ostomy with reversal, bio AVR/MVR, HFpEF, afib on Eliquis who presented with AMS. Patient with AMS and facial droop, became combative on arrival to ER and required Versed, shortly after got intubated for airway protection and transferred to ICU for monitoring. Neurosurgery was consulted for acute on chronic SDH. Stroke team called for consult after patient found with slurred speech and possible aphasia  post extubation.      SUBJECTIVE: No events overnight.  No new neurologic complaints.  ROS reported negative unless otherwise noted.    atorvastatin 40 milliGRAM(s) Oral at bedtime  carvedilol 12.5 milliGRAM(s) Oral every 12 hours  chlorhexidine 2% Cloths 1 Application(s) Topical daily  heparin   Injectable 5000 Unit(s) SubCutaneous every 8 hours  levETIRAcetam  IVPB 1000 milliGRAM(s) IV Intermittent every 12 hours  sodium chloride 0.9%. 1000 milliLiter(s) IV Continuous <Continuous>      PHYSICAL EXAM:   Vital Signs Last 24 Hrs  T(C): 36.4 (24 May 2023 08:00), Max: 37.6 (23 May 2023 18:00)  T(F): 97.5 (24 May 2023 08:00), Max: 99.7 (23 May 2023 18:00)  HR: 59 (24 May 2023 08:00) (55 - 79)  BP: 150/69 (24 May 2023 08:00) (118/53 - 159/67)  BP(mean): 89 (24 May 2023 08:00) (73 - 93)  RR: 20 (24 May 2023 08:00) (11 - 24)  SpO2: 96% (24 May 2023 08:00) (91% - 100%)    Parameters below as of 23 May 2023 16:00  Patient On (Oxygen Delivery Method): room air        General: No acute distress, sitting on a chair, cervical collar on. Verbal, participating in exam.     NEUROLOGICAL EXAM:  Mental status: Awake, alert, oriented to self, place, date, month, recent events, speech fluent, follows commands, no neglect, normal memory. ID's objects.   Cranial Nerves: No facial asymmetry, SHANEL 3--->2, brisk, no nystagmus, no dysarthria,  tongue midline.   Motor exam: Normal tone,  5/5 RUE, 5/5 RLE, 5/5 LUE, 5/5 LLE, no drift,  normal fine finger movements.  Sensation: Intact to light touch all extremeties  Coordination/ Gait: No dysmetria, gait not tested    LABS:                        11.6   6.95  )-----------( 163      ( 24 May 2023 04:00 )             36.8    05-24    140  |  110<H>  |  17.8  ----------------------------<  88  3.6   |  19.0<L>  |  1.66<H>    Ca    8.4      24 May 2023 04:00  Phos  3.1     05-24  Mg     2.1     05-24    TPro  6.8  /  Alb  3.3  /  TBili  0.8  /  DBili  x   /  AST  33  /  ALT  22  /  AlkPhos  90  05-22  PT/INR - ( 23 May 2023 04:40 )   PT: 12.9 sec;   INR: 1.11 ratio         PTT - ( 23 May 2023 04:40 )  PTT:35.5 sec    LDL/ HDL= pending   A1C= Pending        IMAGING: Reviewed by me.     TTE Echo Limited or F/U (05.23.23 @ 12:39)   1. Left ventricular ejection fraction, by visual estimation, is 60 to   65%.   2. Normal global left ventricular systolic function.   3. Spectral Doppler shows pseudonormal pattern of left ventricular   myocardial filling (Grade II diastolic dysfunction).   4. Normal left ventricular internal cavity size.   5. Normal right ventricular size and function.   6. Moderately enlarged left atrium.   7. Normal right atrial size.   8. Mitral prosthesis with mild stenosis.   9. Mild to moderate mitral valve regurgitation.  10. Possible mobile echodensity on the posterior mitral valve. Cannot   rule out vegetation. Consider MUSTAPHA for further evaluation.  11. Bioprosthesis in the aortic position demonstrating normal function.  12. Mild tricuspid regurgitation.  13. Dilatation of the aortic root.  14. There is no evidence of pericardial effusion.  15. Estimated pulmonary artery systolic pressure is 42.8 mmHg assuming a   right atrial pressure of 8 mmHg, which is consistent with mild pulmonary   hypertension.      CT Head No Cont (05.23.23 @ 00:15)   IMPRESSION: Stable CT of the brain. No significant interval change of a   left frontal subdural collection.    CT Brain/ Neck  Perfusion Maps Stroke (05.22.23 @ 19:14)     CTA brain:No hemodynamically significant stenosis  CTA carotid/vertebral artery circulation: Aortic dissection extending   into the left proximal subclavian artery, and minimally into the proximal   right subclavian artery/proximal right common carotid artery, is   reidentified without change from prior exam.  There is heavy   calcification of the right carotid bulb and proximal right internal   carotid artery resulting in a focal 40% stenosis at the origin of the   right ICA. There are calcifications ofthe left carotid bulb and proximal   left internal carotid artery without hemodynamically significant stenosis.  CT Perfusion: Core infarct in the right posterior parietal and temporal   lobes, as seen on CTA head. No territory at ischemic risk.     [facility] Stroke ProgressNote    CC: slurred speech/aphasia/AMS  HPI:  63yo M w/ PMH aortic dissection s/p repair (2010) now stable, bowel ischemia s/p bowel resection and ostomy with reversal, bio AVR/MVR, HFpEF, afib on Eliquis who presented with AMS. Patient with AMS and facial droop, became combative on arrival to ER and required Versed, shortly after got intubated for airway protection and transferred to ICU for monitoring. Neurosurgery was consulted for acute on chronic SDH. Stroke team called for consult after patient found with slurred speech and possible aphasia  post extubation.      SUBJECTIVE: No events overnight.  No new neurologic complaints.  Doing much better, speaking clearly, no aphasia ROS reported negative unless otherwise noted.    atorvastatin 40 milliGRAM(s) Oral at bedtime  carvedilol 12.5 milliGRAM(s) Oral every 12 hours  chlorhexidine 2% Cloths 1 Application(s) Topical daily  heparin   Injectable 5000 Unit(s) SubCutaneous every 8 hours  levETIRAcetam  IVPB 1000 milliGRAM(s) IV Intermittent every 12 hours  sodium chloride 0.9%. 1000 milliLiter(s) IV Continuous <Continuous>      PHYSICAL EXAM:   Vital Signs Last 24 Hrs  T(C): 36.4 (24 May 2023 08:00), Max: 37.6 (23 May 2023 18:00)  T(F): 97.5 (24 May 2023 08:00), Max: 99.7 (23 May 2023 18:00)  HR: 59 (24 May 2023 08:00) (55 - 79)  BP: 150/69 (24 May 2023 08:00) (118/53 - 159/67)  BP(mean): 89 (24 May 2023 08:00) (73 - 93)  RR: 20 (24 May 2023 08:00) (11 - 24)  SpO2: 96% (24 May 2023 08:00) (91% - 100%)    Parameters below as of 23 May 2023 16:00  Patient On (Oxygen Delivery Method): room air    General: No acute distress, sitting on a chair, cervical collar on. Verbal, participating in exam.     NEUROLOGICAL EXAM:  Mental status: Awake, alert, oriented to self, place, date, month, recent events, speech fluent, follows commands, no neglect, normal memory. ID's objects.   Cranial Nerves: No facial asymmetry, SHANEL 3--->2, brisk, no nystagmus, no dysarthria,  tongue midline.   Motor exam: Normal tone,  5/5 RUE, 5/5 RLE, 5/5 LUE, 5/5 LLE, no drift,  normal fine finger movements.  Sensation: Intact to light touch all extremities  Coordination/ Gait: No dysmetria, gait not tested    LABS:                        11.6   6.95  )-----------( 163      ( 24 May 2023 04:00 )             36.8    05-24    140  |  110<H>  |  17.8  ----------------------------<  88  3.6   |  19.0<L>  |  1.66<H>    Ca    8.4      24 May 2023 04:00  Phos  3.1     05-24  Mg     2.1     05-24    TPro  6.8  /  Alb  3.3  /  TBili  0.8  /  DBili  x   /  AST  33  /  ALT  22  /  AlkPhos  90  05-22  PT/INR - ( 23 May 2023 04:40 )   PT: 12.9 sec;   INR: 1.11 ratio         PTT - ( 23 May 2023 04:40 )  PTT:35.5 sec    LDL/ HDL= pending   A1C= Pending    IMAGING: Reviewed by me.     TTE Echo Limited or F/U (05.23.23 @ 12:39)   1. Left ventricular ejection fraction, by visual estimation, is 60 to   65%.   2. Normal global left ventricular systolic function.   3. Spectral Doppler shows pseudonormal pattern of left ventricular   myocardial filling (Grade II diastolic dysfunction).  EEG Classification / Summary:  Abnormal EEG in the awake, drowsy, and asleep states.  -Occasional left frontotemporal sharp waves  -Occasional left frontal sharp waves  -Occasional left frontotemporal focal slowing, at times semi-rhythmic  -Mild diffuse slowing  -No electrographic seizures captured    Clinical Impression:  -Occasional left frontotemporal and left frontal epileptiform discharges indicate potentially epileptogenic foci in these regions.  -Left frontotemporal focal cerebral dysfunction can be structural or functional in etiology.  -Mild diffuse cerebral dysfunction is nonspecific in etiology.   4. Normal left ventricular internal cavity size.   5. Normal right ventricular size and function.   6. Moderately enlarged left atrium.   7. Normal right atrial size.   8. Mitral prosthesis with mild stenosis.   9. Mild to moderate mitral valve regurgitation.  10. Possible mobile echodensity on the posterior mitral valve. Cannot   rule out vegetation. Consider MUSTAPHA for further evaluation.  11. Bioprosthesis in the aortic position demonstrating normal function.  12. Mild tricuspid regurgitation.  13. Dilatation of the aortic root.  14. There is no evidence of pericardial effusion.  15. Estimated pulmonary artery systolic pressure is 42.8 mmHg assuming a   right atrial pressure of 8 mmHg, which is consistent with mild pulmonary   hypertension.      CT Head No Cont (05.23.23 @ 00:15)   IMPRESSION: Stable CT of the brain. No significant interval change of a   left frontal subdural collection.    CT Brain/ Neck  Perfusion Maps Stroke (05.22.23 @ 19:14)     CTA brain:No hemodynamically significant stenosis  CTA carotid/vertebral artery circulation: Aortic dissection extending   into the left proximal subclavian artery, and minimally into the proximal   right subclavian artery/proximal right common carotid artery, is   reidentified without change from prior exam.  There is heavy   calcification of the right carotid bulb and proximal right internal   carotid artery resulting in a focal 40% stenosis at the origin of the   right ICA. There are calcifications ofthe left carotid bulb and proximal   left internal carotid artery without hemodynamically significant stenosis.  CT Perfusion: Core infarct in the right posterior parietal and temporal   lobes, as seen on CTA head. No territory at ischemic risk.    EEG Classification / Summary 5/23-5/24/23:  Abnormal EEG in the awake, drowsy, and asleep states.  -Occasional left frontotemporal sharp waves  -Occasional left frontal sharp waves  -Occasional left frontotemporal focal slowing, at times semi-rhythmic  -Mild diffuse slowing  -No electrographic seizures captured    Clinical Impression:  -Occasional left frontotemporal and left frontal epileptiform discharges indicate potentially epileptogenic foci in these regions.  -Left frontotemporal focal cerebral dysfunction can be structural or functional in etiology.  -Mild diffuse cerebral dysfunction is nonspecific in etiology.

## 2023-05-24 NOTE — PROGRESS NOTE ADULT - ASSESSMENT
INCOMPLETE  ASSESSMENT: 63yo M w/ PMH aortic dissection s/p repair (2010) now stable, bowel ischemia s/p bowel resection and ostomy with reversal, bio AVR/MVR, HFpEF, afib on Eliquis who presented with AMS. Patient is following by neurosurgery   for acute on chronic SDH. Pt. found to have with slurred speech after being extubated. Neurological exam significant for aphasia. CTH without significant interval change of a left frontal subdural collection. CTP head with Core infarct in the right posterior parietal and temporal lobes as seen on CT head. No territory at ischemic risk. Rule out stroke, screen for seizure and causes of metabolic encephalopathy.       NEURO: Continue close monitoring for neurologic deterioration, with stroke checks q 2 hour, permissive HTN, lipid panel, A1C, titrate statin to LDL goal less than 70, MRI Brain w/o, cont EEG revealed no epileptic activity. Aphasia improving.    Physical therapy/OT/Speech eval/treatment.     ANTITHROMBOTIC THERAPY: on hold at the moment, until cleared by neurosurgery and trauma/SICU    PULMONARY:  protecting airway, saturating well on NC 3l/min    CARDIOVASCULAR:  TTE noted, recommend MUSTAPHA, continue cardiac monitoring, cardiology following                             SBP goal: 120-160 mmHg    GASTROINTESTINAL:  dysphagia screen  pending, consider OGT for nutritional support, hydration     Diet: NPO    RENAL: BUN/Cr 17.8/1.66, elevated, monitor urine output , hydrate as tolerated     Na Goal: Greater than 135     Chapman: N    HEMATOLOGY: H/H 11.6/36.8, Platelets 163, slightly anemic, trend CBC, Patient should have all age and risk appropriate malignancy screening with PCP or sooner     DVT ppx: Heparin s.c [] LMWH [] SCD's    ID: afebrile, no leukocytosis, monitor for infection      DISPOSITION: Rehab or home depending on PT eval once stable and workup is complete      CORE MEASURES:        Admission NIHSS:      TNK: [] YES [x] NO      LDL/HDL/A1C: pending     Depression Screen: pending     Statin Therapy: pending     Dysphagia Screen: [] PASS [] FAIL pending     Smoking [x] YES [] NO      Afib [x] YES [] NO     Stroke Education [] YES [] NO    Obtain screening lower extremity venous ultrasound in patients who meet 1 or more of the following criteria as patient is high risk for DVT/PE on admission:   [] History of DVT/PE  []Hypercoagulable states (Factor V Leiden, Cancer, OCP, etc. )  []Prolonged immobility (hemiplegia/hemiparesis/post operative or any other extended immobilization)  [] Transferred from outside facility (Rehab or Long term care)  [] Age </= to 50   ASSESSMENT: 61yo M w/ PMH aortic dissection s/p repair (2010) now stable, bowel ischemia s/p bowel resection and ostomy with reversal, bio AVR/MVR, HFpEF, afib on Eliquis who presented with AMS. Patient is following by neurosurgery   for acute on chronic SDH. Pt. found to have with slurred speech after being extubated. Neurological exam significant for aphasia. CTH without significant interval change of a left frontal subdural collection. CTP head with Core infarct in the right posterior parietal and temporal lobes as seen on CT head. No territory at ischemic risk. Rule out stroke, screen for seizure and causes of metabolic encephalopathy.       NEURO: Continue close monitoring for neurologic deterioration, with stroke checks q 2 hour, permissive HTN, currently neurologically stable 130-160 mmHg, avoid rapid fluctuations, please obtain lipid panel, A1C, titrate statin to LDL goal less than 70, MRI Brain w/o,  EEG revealed no epileptic activity. Aphasia improving.  Physical therapy/OT/Speech eval/treatment.     ANTITHROMBOTIC THERAPY: on hold at the moment, until cleared by neurosurgery and trauma/SICU, pending neuro imaging    PULMONARY:  protecting airway, saturating well on room air    CARDIOVASCULAR:  TTE noted, recommend MUSTAPHA, continue cardiac monitoring, cardiology following                             SBP goal: 120-160 mmHg    GASTROINTESTINAL:  dysphagia screen pass, monitor for aspiration      Diet: DASH diet    RENAL: BUN/Cr 17.8/1.66, elevated, monitor urine output , hydrate as tolerated     Na Goal: Greater than 135     Chapman: N    HEMATOLOGY: H/H 11.6/36.8, Platelets 163, slightly anemic, trend CBC, Patient should have all age and risk appropriate malignancy screening with PCP or sooner     DVT ppx: Heparin s.c [] LMWH [] SCD's    ID: afebrile, no leukocytosis, monitor for infection      DISPOSITION: Rehab or home depending on PT eval once stable and workup is complete      CORE MEASURES:        Admission NIHSS:      TNK: [] YES [x] NO      LDL/HDL/A1C: pending     Depression Screen: pending     Statin Therapy: Atorvastatin 40  mg     Dysphagia Screen: [x] PASS [] FAIL      Smoking [x] YES [] NO      Afib [x] YES [] NO     Stroke Education [x] YES [] NO    Obtain screening lower extremity venous ultrasound in patients who meet 1 or more of the following criteria as patient is high risk for DVT/PE on admission:   [] History of DVT/PE  []Hypercoagulable states (Factor V Leiden, Cancer, OCP, etc. )  []Prolonged immobility (hemiplegia/hemiparesis/post operative or any other extended immobilization)  [] Transferred from outside facility (Rehab or Long term care)  [] Age </= to 50

## 2023-05-24 NOTE — DIETITIAN INITIAL EVALUATION ADULT - OTHER INFO
62 year old male w/ PMH aortic dissection s/p repair (2010), bowel ischemia s/p bowel resection and ostomy with reversal, bio AVR/MVR, HFpEF, afib on Eliquis who presented with AMS. HPI per ED note was that patient. was last seen normal 48hours ago. Family reported that neighbor below his house heard a large thud. Family came to find patient altered and appeared to be sleeping with facial droop and am weakness. Upon arrival in ED was combative and received versed 10mg IM from EMS, Patient was intubated per ED for airway protection, supposedly GCS of 6. Code stroke called and patient went directly to CT, found with acute on chronic SDH.      Nutrition assessment completed. Pt reports good appetite/po intake at this time. Breakfast tray observed at bedside, >75% consumed per plate waste. Pt denies chewing/swallowing difficulty. Denies any weight changes prior to admission; follows a regular diet. Encouraged HBV protein sources. RD to follow up.

## 2023-05-24 NOTE — PROGRESS NOTE ADULT - NS ATTEND AMEND GEN_ALL_CORE FT
Patient seen and evaluated by me on ICU rounds. Mental status significantly improved. GCS is 15 and Aphasia resolved. He is pending MRI as recommended by neurology. EEG grossly unremarkable. Will attempt to clinically clear collar now that mental status improved. Plan for downgrade to the floor once patient remains clinically stable.

## 2023-05-24 NOTE — PROGRESS NOTE ADULT - SUBJECTIVE AND OBJECTIVE BOX
JONATHAN GIBSON  ----------------------------------------  The patient was seen at bedside. Patient with subdural hematoma. Offered no complaints. Denied headache or neck pain.    Vital Signs Last 24 Hrs  T(C): 36.8 (24 May 2023 15:58), Max: 37.6 (23 May 2023 18:00)  T(F): 98.3 (24 May 2023 15:58), Max: 99.7 (23 May 2023 18:00)  HR: 64 (24 May 2023 16:00) (58 - 68)  BP: 156/81 (24 May 2023 16:00) (125/68 - 160/69)  BP(mean): 103 (24 May 2023 16:00) (73 - 115)  RR: 21 (24 May 2023 16:00) (15 - 24)  SpO2: 96% (24 May 2023 16:00) (84% - 100%)    PHYSICAL EXAMINATION:  ----------------------------------------  General appearance: No acute distress, Awake, Alert  HEENT: Normocephalic, Atraumatic, Conjunctiva clear, EOMI  Neck: Supple, No JVD, No tenderness  Lungs: Breath sound equal bilaterally, No wheezes, No rales  Cardiovascular: S1S2, Regular rhythm  Abdomen: Soft, Nontender, Nondistended, No guarding/rebound, Positive bowel sounds, Well healed abdominal scars  Extremities: No clubbing, No cyanosis, No edema, No calf tenderness, Left upper extremity AV graft  Neuro: Strength equal bilaterally, No tremors  Psychiatric: Appropriate mood, Normal affect    LABORATORY STUDIES:  ----------------------------------------             11.6   6.95  )-----------( 163      ( 24 May 2023 04:00 )             36.8     05-24    140  |  110<H>  |  17.8  ----------------------------<  88  3.6   |  19.0<L>  |  1.66<H>    Ca    8.4      24 May 2023 04:00  Phos  3.1     05-24  Mg     2.1     05-24    TPro  6.8  /  Alb  3.3  /  TBili  0.8  /  DBili  x   /  AST  33  /  ALT  22  /  AlkPhos  90  05-22    LIVER FUNCTIONS - ( 22 May 2023 19:30 )  Alb: 3.3 g/dL / Pro: 6.8 g/dL / ALK PHOS: 90 U/L / ALT: 22 U/L / AST: 33 U/L / GGT: x           PT/INR - ( 23 May 2023 04:40 )   PT: 12.9 sec;   INR: 1.11 ratio    PTT - ( 23 May 2023 04:40 )  PTT:35.5 sec    CARDIAC MARKERS ( 24 May 2023 04:00 )  x     / 0.03 ng/mL / x     / x     / x      CARDIAC MARKERS ( 23 May 2023 17:52 )  x     / x     / 3268 U/L / x     / 8.0 ng/mL  CARDIAC MARKERS ( 23 May 2023 04:40 )  x     / x     / 3435 U/L / x     / 11.2 ng/mL  CARDIAC MARKERS ( 22 May 2023 19:30 )  x     / 0.03 ng/mL / 2472 U/L / x     / 6.0 ng/mL    Urinalysis Basic - ( 22 May 2023 20:09 )  Color: Yellow / Appearance: Clear / S.010 / pH: x  Gluc: x / Ketone: Negative  / Bili: Negative / Urobili: Negative mg/dL   Blood: x / Protein: 100 / Nitrite: Negative   Leuk Esterase: Negative / RBC: 3-5 /HPF / WBC 0-2 /HPF   Sq Epi: x / Non Sq Epi: x / Bacteria: Occasional    Culture - Urine (collected 22 May 2023 20:09)  Source: Clean Catch Clean Catch (Midstream)  Final Report (23 May 2023 22:21):    No growth    Culture - Blood (collected 22 May 2023 18:35)  Source: .Blood Blood-Peripheral  Preliminary Report (24 May 2023 01:02):    No growth to date.    Culture - Blood (collected 22 May 2023 18:30)  Source: .Blood Blood-Peripheral  Preliminary Report (24 May 2023 01:02):    No growth to date.    MEDICATIONS  (STANDING):  atorvastatin 40 milliGRAM(s) Oral at bedtime  carvedilol 12.5 milliGRAM(s) Oral every 12 hours  chlorhexidine 2% Cloths 1 Application(s) Topical daily  heparin   Injectable 5000 Unit(s) SubCutaneous every 8 hours  levETIRAcetam  IVPB 1000 milliGRAM(s) IV Intermittent every 12 hours      ASSESSMENT / PLAN:  ----------------------------------------  62M with a history of atrial fibrillation, coronary artery disease with bypass surgery, cardiomyopathy, bioprosthetic aortic and mitral valve replacements, stroke, hypertension, aortic dissection repair with bowel ischemia requiring bowel resection, and renal failure which had resolved presented after being found unresponsive in the kitchen for an unknown period of time. Initiation CT of the head was notable for the prior stroke and an acute on chronic subdural hematoma. The patient was intubated and admitted to the Surgical Intensive Care Unit. Repeat CT of the head was without significant change and the patient was subsequently extubated. EEG monitoring noted occasional epileptiform discharges but no seizures. The patient was transferred to the Medical service for further management.    Acute on chronic subdural hematoma - Repeat CT of the head was without significant change. Continue to monitor status with serial neurological examinations. On prophylactic levetiracetam. Pending MRI of the brain.    Fall / Syncope - CT scan and Xray studies were without acute injuries. Evidence of rhabdomyolysis with elevated CPK. Continue to monitor levels which are slowly decreasing.    Acute kidney injury - The patient reported previously requiring hemodialysis after his aortic dissection repair but it was discontinued after his renal function improved. Continue to monitor renal functions and urine output. Torsemide to be held for now.    Atrial fibrillation - On carvedilol. Apixaban held in the setting of intracranial hemorrhage.    Coronary artery disease / Cardiomyopathy - Antiplatelet agents held in the setting of intracranial hemorrhage. For resumption of atorvastatin once CPK has improved. Serial troponin levels were not elevated. Echocardiogram noted preserved left ventricular systolic function with grade II diastolic dysfunction and possible echodensity on the mitral valve. Blood cultures are without growth. Cardiology consultation to evaluate if transesophageal echocardiogram is indicated for the mobile density.

## 2023-05-24 NOTE — CHART NOTE - NSCHARTNOTEFT_GEN_A_CORE
SICU TRANSFER NOTE  -----------------------------  ICU Admission Date: 5/23/23  Transfer Date: 05-24-23 @ 16:45    Admission Diagnosis: Acute on chronic SDH    Active Problems/injuries:   1. Syncope, seizure vs TIA  2. Rhabdomyolysis/ SANTOS  3. Acute on chronic SDH    Procedures: Intubation@5/22/23, Extubation@5/23/23    Consultants:  [x] Neurology  [ ] Cardiology  [ ] Endocrine  [ ] Infectious Disease  [ ] Medicine  [ ] Neurosurgery  [ ] Ortho       [ ] Weight Bearing Status:  [ ] Palliative       [ ] Advanced Directives:    [ ] Physical Medicine and Rehab       [ ] Disposition :   [ ] Plastics  [ ] Pulmonary    Medications  atorvastatin 40 milliGRAM(s) Oral at bedtime  carvedilol 12.5 milliGRAM(s) Oral every 12 hours  chlorhexidine 2% Cloths 1 Application(s) Topical daily  heparin   Injectable 5000 Unit(s) SubCutaneous every 8 hours  levETIRAcetam  IVPB 1000 milliGRAM(s) IV Intermittent every 12 hours      [x] I attest I have reviewed and reconciled all medications prior to transfer    IV Fluids  sodium chloride 0.9%.: Solution, 1000 milliLiter(s) infuse at 75 mL/Hr  Provider's Contact #: [phone]      I have discussed this case with Dr Roberts upon transfer and all questions regarding ICU course were answered.  The following items are to be followed up:

## 2023-05-24 NOTE — CHART NOTE - NSCHARTNOTEFT_GEN_A_CORE
Cervical spine CT reviewed, no fracture, yesterday unable to clear c-spine due to altered sensorium. Mental status normal today and cervical spine is cleared by confrontational exam. yesterday unable to clear c-spine due to altered sensorium. Mental status normal today and cervical spine is cleared by confrontational exam.

## 2023-05-24 NOTE — CONSULT NOTE ADULT - PROBLEM SELECTOR RECOMMENDATION 2
Low na diet  intake and out put  Was on torsemide 20mg qd  to restart soon now that po intake is better  continue coreg for b/p control

## 2023-05-24 NOTE — CONSULT NOTE ADULT - ASSESSMENT
61yo M w/ PMH aortic dissection s/p repair (2010) now stable, bowel ischemia s/p bowel resection and ostomy with reversal, bio AVR/MVR, HFpEF, afib on Eliquis who presented with AMS. Found intubated and sedated, no family at bedside. HPI per ED note was that patient. was last seen normal 48hours ago. Family reported that neighbor below his house heard a large thud. Family came to find patient altered and appeared to be sleeping.with facial droop and am weakness. Patient was diagnosed with subdural hematoma.  We were asked to see patient for Possible mobile echodensity on the posterior mitral valve. Cannot r/o endocarditis.  Blood cultures have been negative  rule out vegetation.    61yo M w/ PMH aortic dissection s/p repair (2010) now stable, bowel ischemia s/p bowel resection and ostomy with reversal, bio AVR/MVR, HFpEF, afib on Eliquis who presented with AMS. Found intubated and sedated, no family at bedside. HPI per ED note was that patient. was last seen normal 48hours ago. Family reported that neighbor below his house heard a large thud. Family came to find patient altered and appeared to be sleeping. with facial droop and am weakness. Patient was diagnosed with  intracranial  hematoma.  We were asked to see patient for Possible mobile echodensity on the posterior mitral valve. Cannot r/o endocarditis.  Blood cultures have been negative  rule out vegetation.

## 2023-05-24 NOTE — EEG REPORT - NS EEG TEXT BOX
JONATHAN GIBSON [id number]     Study Date: 05/23/23 12:27 - 05/24/23 08:00  Duration: 19 hr 18 min  --------------------------------------------------------------------------------------------------  History:  CC/ HPI Patient is a 62y old  Male who presents with a chief complaint of Found Down, Acute on Chronic SDH (24 May 2023 01:13)    MEDICATIONS  (STANDING):  atorvastatin 40 milliGRAM(s) Oral at bedtime  carvedilol 12.5 milliGRAM(s) Oral every 12 hours  chlorhexidine 2% Cloths 1 Application(s) Topical daily  heparin   Injectable 5000 Unit(s) SubCutaneous every 8 hours  levETIRAcetam  IVPB 1000 milliGRAM(s) IV Intermittent every 12 hours  sodium chloride 0.9%. 1000 milliLiter(s) (75 mL/Hr) IV Continuous <Continuous>    --------------------------------------------------------------------------------------------------  Study Interpretation:    [[[Abbreviation Key:  PDR=alpha rhythm/posterior dominant rhythm. A-P=anterior posterior.  Amplitude: ‘very low’:<20; ‘low’:20-49; ‘medium’:; ‘high’:>150uV.  Persistence for periodic/rhythmic patterns (% of epoch) ‘rare’:<1%; ‘occasional’:1-10%; ‘frequent’:10-50%; ‘abundant’:50-90%; ‘continuous’:>90%.  Persistence for sporadic discharges: ‘rare’:<1/hr; ‘occasional’:1/min-1/hr; ‘frequent’:>1/min; ‘abundant’:>1/10 sec.  RPP=rhythmic and periodic patterns; GRDA=generalized rhythmic delta activity; FIRDA=frontal intermittent GRDA; LRDA=lateralized rhythmic delta activity; TIRDA=temporal intermittent rhythmic delta activity;  LPD=PLED=lateralized periodic discharges; GPD=generalized periodic discharges; BIPDs =bilateral independent periodic discharges; Mf=multifocal; SIRPDs=stimulus induced rhythmic, periodic, or ictal appearing discharges; BIRDs=brief potentially ictal rhythmic discharges >4 Hz, lasting .5-10s; PFA (paroxysmal bursts >13 Hz or =8 Hz <10s).  Modifiers: +F=with fast component; +S=with spike component; +R=with rhythmic component.  S-B=burst suppression pattern.  Max=maximal. N1-drowsy; N2-stage II sleep; N3-slow wave sleep. SSS/BETS=small sharp spikes/benign epileptiform transients of sleep. HV=hyperventilation; PS=photic stimulation]]]    Daily EEG Visual Analysis    FINDINGS:      Background:  Continuity: Continuous  Symmetry: Symmetric  Posterior dominant rhythm (PDR): Poorly formed, 7 Hz, reactive to eye closure. Symmetric low-amplitude frontal beta in wakefulness.  State Change: Present  Voltage: Normal  Anterior Posterior Gradient: Present  Other background findings: Diffuse theta and delta slowing in wakefulness  Breach: Absent    Background Slowing:  Generalized slowing: As above  Focal slowing: Rare left frontotemporal focal polymorphic delta slowing    State Changes:   Drowsiness is characterized by fragmentation, attenuation, and slowing of the background activity.  Stage 2 sleep is characterized by sleep spindles that are occasionally less well formed on the left.    Sporadic Epileptiform Discharges:    Occasional left frontotemporal (F7) sharp waves.  Occasional left frontal (Fp1/F3) sharp waves.    Rhythmic and Periodic Patterns (RPPs):  None     Electrographic and Electroclinical seizures:  None    Other Clinical Events:  None    Activation Procedures:   Hyperventilation was not performed.    Photic stimulation was not performed.    Artifacts:  Intermittent myogenic and movement artifacts are present.    EKG:  Single-lead EKG shows regular rhythm.    EEG Classification / Summary:  Abnormal EEG in the awake, drowsy, and asleep states.  -Occasional left frontotemporal sharp waves  -Occasional left frontal sharp waves  -Rare left frontotemporal focal slowing  -Mild diffuse slowing  -No electrographic seizures captured    Clinical Impression:  -Occasional left frontotemporal and left frontal epileptiform discharges indicate potentially epileptogenic foci in these regions.  -Mild left frontotemporal focal cerebral dysfunction can be structural or functional in etiology.  -Mild diffuse cerebral dysfunction is nonspecific in etiology.        -------------------------------------------------------------------------------------------------------  [facility] EEG Reading Room Ph#: [phone]  Epilepsy Answering Service after 5PM and before 8:30AM: Ph#: [phone]    Marla Oconnor MD  Attending Physician, [facility]   JONATHAN GIBSON [facility]-[id number]     Study Date: 05/23/23 12:27 - 05/24/23 11:38  Duration: 22 hr 52 min  --------------------------------------------------------------------------------------------------  History:  CC/ HPI Patient is a 62y old  Male who presents with a chief complaint of Found Down, Acute on Chronic SDH (24 May 2023 01:13)    MEDICATIONS  (STANDING):  atorvastatin 40 milliGRAM(s) Oral at bedtime  carvedilol 12.5 milliGRAM(s) Oral every 12 hours  chlorhexidine 2% Cloths 1 Application(s) Topical daily  heparin   Injectable 5000 Unit(s) SubCutaneous every 8 hours  levETIRAcetam  IVPB 1000 milliGRAM(s) IV Intermittent every 12 hours  sodium chloride 0.9%. 1000 milliLiter(s) (75 mL/Hr) IV Continuous <Continuous>    --------------------------------------------------------------------------------------------------  Study Interpretation:    [[[Abbreviation Key:  PDR=alpha rhythm/posterior dominant rhythm. A-P=anterior posterior.  Amplitude: ‘very low’:<20; ‘low’:20-49; ‘medium’:; ‘high’:>150uV.  Persistence for periodic/rhythmic patterns (% of epoch) ‘rare’:<1%; ‘occasional’:1-10%; ‘frequent’:10-50%; ‘abundant’:50-90%; ‘continuous’:>90%.  Persistence for sporadic discharges: ‘rare’:<1/hr; ‘occasional’:1/min-1/hr; ‘frequent’:>1/min; ‘abundant’:>1/10 sec.  RPP=rhythmic and periodic patterns; GRDA=generalized rhythmic delta activity; FIRDA=frontal intermittent GRDA; LRDA=lateralized rhythmic delta activity; TIRDA=temporal intermittent rhythmic delta activity;  LPD=PLED=lateralized periodic discharges; GPD=generalized periodic discharges; BIPDs =bilateral independent periodic discharges; Mf=multifocal; SIRPDs=stimulus induced rhythmic, periodic, or ictal appearing discharges; BIRDs=brief potentially ictal rhythmic discharges >4 Hz, lasting .5-10s; PFA (paroxysmal bursts >13 Hz or =8 Hz <10s).  Modifiers: +F=with fast component; +S=with spike component; +R=with rhythmic component.  S-B=burst suppression pattern.  Max=maximal. N1-drowsy; N2-stage II sleep; N3-slow wave sleep. SSS/BETS=small sharp spikes/benign epileptiform transients of sleep. HV=hyperventilation; PS=photic stimulation]]]    Daily EEG Visual Analysis    FINDINGS:      Background:  Continuity: Continuous  Symmetry: Symmetric  Posterior dominant rhythm (PDR): Poorly formed, 7 Hz, reactive to eye closure. Symmetric low-amplitude frontal beta in wakefulness.  State Change: Present  Voltage: Normal  Anterior Posterior Gradient: Present  Other background findings: Diffuse theta and delta slowing in wakefulness  Breach: Absent    Background Slowing:  Generalized slowing: As above  Focal slowing: Occasional left frontotemporal focal polymorphic delta slowing, at times semi-rhythmic    State Changes:   Drowsiness is characterized by fragmentation, attenuation, and slowing of the background activity.  Stage 2 sleep is characterized by sleep spindles that are occasionally less well formed on the left.    Sporadic Epileptiform Discharges:    Occasional left frontotemporal (F7) sharp waves.  Occasional left frontal (Fp1/F3) sharp waves.    Rhythmic and Periodic Patterns (RPPs):  None     Electrographic and Electroclinical seizures:  None    Other Clinical Events:  None    Activation Procedures:   Hyperventilation was not performed.    Photic stimulation was not performed.    Artifacts:  Intermittent myogenic and movement artifacts are present.    EKG:  Single-lead EKG shows regular rhythm.    EEG Classification / Summary:  Abnormal EEG in the awake, drowsy, and asleep states.  -Occasional left frontotemporal sharp waves  -Occasional left frontal sharp waves  -Occasional left frontotemporal focal slowing, at times semi-rhythmic  -Mild diffuse slowing  -No electrographic seizures captured    Clinical Impression:  -Occasional left frontotemporal and left frontal epileptiform discharges indicate potentially epileptogenic foci in these regions.  -Left frontotemporal focal cerebral dysfunction can be structural or functional in etiology.  -Mild diffuse cerebral dysfunction is nonspecific in etiology.        -------------------------------------------------------------------------------------------------------  [facility] EEG Reading Room Ph#: [phone]  Epilepsy Answering Service after 5PM and before 8:30AM: Ph#: [phone]    Marla Oconnor MD  Attending Physician, [facility]

## 2023-05-24 NOTE — PROGRESS NOTE ADULT - SUBJECTIVE AND OBJECTIVE BOX
24 HOUR EVENTS: Patient's aphasia improved, seems to be resolved. Vitals remain stable. EEG negative for seizures. Negative syncopal work up thus far. Dysphagia pending for the AM. UOP adequate. Lytes wnl.     SUBJECTIVE/ROS:  [x ] A ten-point review of systems was otherwise negative except as noted.  [ ] Due to altered mental status/intubation, subjective information were not able to be obtained from the patient. History was obtained, to the extent possible, from review of the chart and collateral sources of information.    ICU Vital Signs Last 24 Hrs  T(C): 37.5 (24 May 2023 00:00), Max: 37.6 (23 May 2023 18:00)  T(F): 99.5 (24 May 2023 00:00), Max: 99.7 (23 May 2023 18:00)  HR: 61 (24 May 2023 00:00) (52 - 79)  BP: 141/62 (24 May 2023 00:00) (100/53 - 159/67)  BP(mean): 78 (24 May 2023 00:00) (67 - 93)  ABP: --  ABP(mean): --  RR: 16 (24 May 2023 00:00) (11 - 24)  SpO2: 93% (24 May 2023 00:00) (92% - 100%)    O2 Parameters below as of 23 May 2023 16:00  Patient On (Oxygen Delivery Method): room air      Physical  Constitutional: NAD, resting comfortably  Neck: trachea midline  Respiratory: unlabored   Cardiovascular: S1S2  Gastrointestinal: abd. soft, NT/ND  Genitourinary: +painting  Extremities: no LE edema b/l  Neurological: AOx3, motor and sensation grossly intact, slight LUE weakness noted 4/5 (baseline)   Skin: warm, dry, intact      NEURO  RASS:     GCS:     CAM ICU:  Exam:   Meds: levETIRAcetam  IVPB 1000 milliGRAM(s) IV Intermittent every 12 hours    [x] Adequacy of sedation and pain control has been assessed and adjusted      RESPIRATORY  RR: 16 (05-24-23 @ 00:00) (11 - 24)  SpO2: 93% (05-24-23 @ 00:00) (92% - 100%)  Wt(kg): --  Exam: unlabored, clear to auscultation bilaterally  Mechanical Ventilation: Mode: CPAP with PS, RR (patient): 11, FiO2: 40, PEEP: 6, PS: 5, MAP: 8, PIP: 12  ABG - ( 23 May 2023 05:21 )  pH: 7.390 /  pCO2: 40    /  pO2: 171   / HCO3: 24    / Base Excess: -0.8  /  SaO2: 100.0   Lactate: x                [ ] Extubation Readiness Assessed  Meds:       CARDIOVASCULAR  HR: 61 (05-24-23 @ 00:00) (52 - 79)  BP: 141/62 (05-24-23 @ 00:00) (100/53 - 159/67)  BP(mean): 78 (05-24-23 @ 00:00) (67 - 93)  ABP: --  ABP(mean): --  Wt(kg): --  CVP(cm H2O): --      Exam:  Cardiac Rhythm:  Perfusion     [ ]Adequate   [ ]Inadequate  Mentation   [ ]Normal       [ ]Reduced  Extremities  [ ]Warm         [ ]Cool  Volume Status [ ]Hypervolemic [ ]Euvolemic [ ]Hypovolemic  Meds: carvedilol 12.5 milliGRAM(s) Oral every 12 hours        GI/NUTRITION  Exam:  Diet:  Meds:     GENITOURINARY  I&O's Detail    05-22 @ 07:01  -  05-23 @ 07:00  --------------------------------------------------------  IN:    FentaNYL: 21.8 mL    Propofol: 20.8 mL    Propofol: 31.2 mL    sodium chloride 0.9%: 450 mL  Total IN: 523.8 mL    OUT:    FentaNYL: 0 mL    Indwelling Catheter - Urethral (mL): 515 mL  Total OUT: 515 mL    Total NET: 8.8 mL      05-23 @ 07:01  -  05-24 @ 01:13  --------------------------------------------------------  IN:    FentaNYL: 8.7 mL    IV PiggyBack: 300 mL    Propofol: 10.4 mL    sodium chloride 0.9%: 1050 mL  Total IN: 1369.1 mL    OUT:    Indwelling Catheter - Urethral (mL): 1125 mL  Total OUT: 1125 mL    Total NET: 244.1 mL          05-23    139  |  105  |  26.8<H>  ----------------------------<  89  3.4<L>   |  22.0  |  1.90<H>    Ca    8.1<L>      23 May 2023 04:40  Phos  3.5     05-23  Mg     2.1     05-23    TPro  6.8  /  Alb  3.3  /  TBili  0.8  /  DBili  x   /  AST  33  /  ALT  22  /  AlkPhos  90  05-22    [ ] Painting catheter, indication: N/A  Meds: sodium chloride 0.9%. 1000 milliLiter(s) IV Continuous <Continuous>        HEMATOLOGIC  Meds:   [x] VTE Prophylaxis                        11.1   10.38 )-----------( 168      ( 23 May 2023 04:40 )             35.2     PT/INR - ( 23 May 2023 04:40 )   PT: 12.9 sec;   INR: 1.11 ratio         PTT - ( 23 May 2023 04:40 )  PTT:35.5 sec  Transfusion     [ ] PRBC   [ ] Platelets   [ ] FFP   [ ] Cryoprecipitate      INFECTIOUS DISEASES  T(C): 37.5 (05-24-23 @ 00:00), Max: 37.6 (05-23-23 @ 18:00)  Wt(kg): --  WBC Count: 10.38 K/uL (05-23 @ 04:40)    Recent Cultures:  Specimen Source: Clean Catch Clean Catch (Midstream), 05-22 @ 20:09; Results   No growth; Gram Stain: --; Organism: --  Specimen Source: .Blood Blood-Peripheral, 05-22 @ 18:35; Results   No growth to date.; Gram Stain: --; Organism: --  Specimen Source: .Blood Blood-Peripheral, 05-22 @ 18:30; Results   No growth to date.; Gram Stain: --; Organism: --    Meds:       ENDOCRINE  Capillary Blood Glucose    Meds: atorvastatin 40 milliGRAM(s) Oral at bedtime        ACCESS DEVICES:  [ ] Peripheral IV  [ ] Central Venous Line	[ ] R	[ ] L	[ ] IJ	[ ] Fem	[ ] SC	Placed:   [ ] Arterial Line		[ ] R	[ ] L	[ ] Fem	[ ] Rad	[ ] Ax	Placed:   [ ] PICC:					[ ] Mediport  [ ] Urinary Catheter, Date Placed:   [ ] Necessity of urinary, arterial, and venous catheters discussed    OTHER MEDICATIONS:  chlorhexidine 2% Cloths 1 Application(s) Topical daily      CODE STATUS:     IMAGING:

## 2023-05-24 NOTE — DIETITIAN INITIAL EVALUATION ADULT - PERTINENT MEDS FT
MEDICATIONS  (STANDING):  atorvastatin 40 milliGRAM(s) Oral at bedtime  carvedilol 12.5 milliGRAM(s) Oral every 12 hours  chlorhexidine 2% Cloths 1 Application(s) Topical daily  heparin   Injectable 5000 Unit(s) SubCutaneous every 8 hours  levETIRAcetam  IVPB 1000 milliGRAM(s) IV Intermittent every 12 hours  sodium chloride 0.9%. 1000 milliLiter(s) (75 mL/Hr) IV Continuous <Continuous>    MEDICATIONS  (PRN):

## 2023-05-24 NOTE — DIETITIAN INITIAL EVALUATION ADULT - PERTINENT LABORATORY DATA
05-24    140  |  110<H>  |  17.8  ----------------------------<  88  3.6   |  19.0<L>  |  1.66<H>    Ca    8.4      24 May 2023 04:00  Phos  3.1     05-24  Mg     2.1     05-24    TPro  6.8  /  Alb  3.3  /  TBili  0.8  /  DBili  x   /  AST  33  /  ALT  22  /  AlkPhos  90  05-22  POCT Blood Glucose.: 86 mg/dL (05-23-23 @ 15:10)

## 2023-05-24 NOTE — PROGRESS NOTE ADULT - SUBJECTIVE AND OBJECTIVE BOX
SUBJECTIVE: Patient seen and examined during morning rounds in SICU with Dr. Garzon. Patient denies headache, chest pain, neck pain, shortness of breath, nausea/vomiting. Extubated yesterday and more awake. In cervical collar.     Vital Signs Last 24 Hrs  T(C): 36.5 (05-24-23 @ 09:00), Max: 37.6 (05-23-23 @ 18:00)  T(F): 97.7 (05-24-23 @ 09:00), Max: 99.7 (05-23-23 @ 18:00)  HR: 65 (05-24-23 @ 12:00) (58 - 66)  BP: 148/72 (05-24-23 @ 12:00) (125/68 - 159/67)  BP(mean): 92 (05-24-23 @ 12:00) (73 - 93)  RR: 22 (05-24-23 @ 12:00) (11 - 22)  SpO2: 99% (05-24-23 @ 12:00) (91% - 100%)    PHYSICAL EXAM:    Constitutional: No Acute Distress, resting comfortably in bed    Neurological: Awake, alert, oriented to self, hospital and year, speech clear and fluent - no aphasia or dysarthria notable on exam this morning, face equal, tongue midline, briskly following commands, no drift, moves all extremities with 5/5 strength, sensation intact to light touch throughout, pupils 4mm and reactive bilaterally, extraocular movements intact, no nystagmus    +Cervical collar in place; +EEG head wrap in place      LABS:                          11.6   6.95  )-----------( 163      ( 24 May 2023 04:00 )             36.8    05-24    140  |  110<H>  |  17.8  ----------------------------<  88  3.6   |  19.0<L>  |  1.66<H>    Ca    8.4      24 May 2023 04:00  Phos  3.1     05-24  Mg     2.1     05-24    TPro  6.8  /  Alb  3.3  /  TBili  0.8  /  DBili  x   /  AST  33  /  ALT  22  /  AlkPhos  90  05-22  PT/INR - ( 23 May 2023 04:40 )   PT: 12.9 sec;   INR: 1.11 ratio         PTT - ( 23 May 2023 04:40 )  PTT:35.5 sec    05-23 @ 07:01  -  05-24 @ 07:00  --------------------------------------------------------  IN: 1969.1 mL / OUT: 2125 mL / NET: -155.9 mL    05-24 @ 07:01  -  05-24 @ 12:16  --------------------------------------------------------  IN: 375 mL / OUT: 200 mL / NET: 175 mL        IMAGING:     CT Head No Cont (05.23.23 @ 00:15)    IMPRESSION: Stable CT of the brain. No significant interval change of a   left frontal subdural collection.    --- End of Report ---    DARIO CULLEN MD; Attending Radiologist  This document has been electronically signed. May 23 2023  1:49AM      CT Brain Stroke Protocol (05.22.23 @ 18:56)  IMPRESSION:    Mixed attenuation subdural hematoma overlying the left frontal lobe,   likely reflecting acute on chronic hemorrhage measuring up to 9 mm in   greatest width. No significant mass effect on underlying parenchyma.    There is a large chronic infarct in the right posterior MCA territory, as   seen on prior exam.    Rest of the chronic findings as above.

## 2023-05-24 NOTE — PROGRESS NOTE ADULT - NS ATTEND AMEND GEN_ALL_CORE FT
Seen and examined with the NP.  Plan discussed with NP.  I agree with as written above with modifications as below    Subdural hematoma  AMS, resolving  EEG no seizure, maintain keppra for now   Await  MRI brain  anticoagulation when cleared by neurosurgery and trauma.      will follow with you    Vincenzo Meléndez MD PhD   [id number]

## 2023-05-24 NOTE — CONSULT NOTE ADULT - PROBLEM SELECTOR RECOMMENDATION 3
Will Get MUSTAPHA  C spine has been cleared  Patient able to lye flat  Will refer for MUSTAPHA tomorrow

## 2023-05-24 NOTE — CONSULT NOTE ADULT - ATTENDING COMMENTS
Chronic atrial fibrillation: Rate controlled on coreg. Doac held due to bleed.  Acute on chronic diastolic congestive heart failure: was on torsemide 20mg qd  to restart soon now that po intake is better. continue coreg    R/O Infective endocarditis: Will Get MUSTAPHA. C spine has been cleared. Patient able to lye flat. Will refer for MUSTAPHA tomorrow.

## 2023-05-25 DIAGNOSIS — R41.82 ALTERED MENTAL STATUS, UNSPECIFIED: ICD-10-CM

## 2023-05-25 LAB
ANION GAP SERPL CALC-SCNC: 12 MMOL/L — SIGNIFICANT CHANGE UP (ref 5–17)
BASOPHILS # BLD AUTO: 0.05 K/UL — SIGNIFICANT CHANGE UP (ref 0–0.2)
BASOPHILS NFR BLD AUTO: 0.5 % — SIGNIFICANT CHANGE UP (ref 0–2)
BUN SERPL-MCNC: 21.2 MG/DL — HIGH (ref 8–20)
CALCIUM SERPL-MCNC: 9.3 MG/DL — SIGNIFICANT CHANGE UP (ref 8.4–10.5)
CHLORIDE SERPL-SCNC: 106 MMOL/L — SIGNIFICANT CHANGE UP (ref 96–108)
CK MB CFR SERPL CALC: 4.8 NG/ML — SIGNIFICANT CHANGE UP (ref 0–6.7)
CK SERPL-CCNC: 5227 U/L — HIGH (ref 30–200)
CO2 SERPL-SCNC: 22 MMOL/L — SIGNIFICANT CHANGE UP (ref 22–29)
CREAT SERPL-MCNC: 1.69 MG/DL — HIGH (ref 0.5–1.3)
EGFR: 46 ML/MIN/1.73M2 — LOW
EOSINOPHIL # BLD AUTO: 0.34 K/UL — SIGNIFICANT CHANGE UP (ref 0–0.5)
EOSINOPHIL NFR BLD AUTO: 3.7 % — SIGNIFICANT CHANGE UP (ref 0–6)
GLUCOSE SERPL-MCNC: 99 MG/DL — SIGNIFICANT CHANGE UP (ref 70–99)
HCT VFR BLD CALC: 40.5 % — SIGNIFICANT CHANGE UP (ref 39–50)
HGB BLD-MCNC: 12.5 G/DL — LOW (ref 13–17)
IMM GRANULOCYTES NFR BLD AUTO: 0.4 % — SIGNIFICANT CHANGE UP (ref 0–0.9)
LYMPHOCYTES # BLD AUTO: 1.12 K/UL — SIGNIFICANT CHANGE UP (ref 1–3.3)
LYMPHOCYTES # BLD AUTO: 12.3 % — LOW (ref 13–44)
MAGNESIUM SERPL-MCNC: 2 MG/DL — SIGNIFICANT CHANGE UP (ref 1.8–2.6)
MCHC RBC-ENTMCNC: 26.8 PG — LOW (ref 27–34)
MCHC RBC-ENTMCNC: 30.9 GM/DL — LOW (ref 32–36)
MCV RBC AUTO: 86.7 FL — SIGNIFICANT CHANGE UP (ref 80–100)
MONOCYTES # BLD AUTO: 0.63 K/UL — SIGNIFICANT CHANGE UP (ref 0–0.9)
MONOCYTES NFR BLD AUTO: 6.9 % — SIGNIFICANT CHANGE UP (ref 2–14)
NEUTROPHILS # BLD AUTO: 6.95 K/UL — SIGNIFICANT CHANGE UP (ref 1.8–7.4)
NEUTROPHILS NFR BLD AUTO: 76.2 % — SIGNIFICANT CHANGE UP (ref 43–77)
PHOSPHATE SERPL-MCNC: 2.6 MG/DL — SIGNIFICANT CHANGE UP (ref 2.4–4.7)
PLATELET # BLD AUTO: 202 K/UL — SIGNIFICANT CHANGE UP (ref 150–400)
POTASSIUM SERPL-MCNC: 3.8 MMOL/L — SIGNIFICANT CHANGE UP (ref 3.5–5.3)
POTASSIUM SERPL-SCNC: 3.8 MMOL/L — SIGNIFICANT CHANGE UP (ref 3.5–5.3)
RBC # BLD: 4.67 M/UL — SIGNIFICANT CHANGE UP (ref 4.2–5.8)
RBC # FLD: 16.1 % — HIGH (ref 10.3–14.5)
SODIUM SERPL-SCNC: 140 MMOL/L — SIGNIFICANT CHANGE UP (ref 135–145)
WBC # BLD: 9.13 K/UL — SIGNIFICANT CHANGE UP (ref 3.8–10.5)
WBC # FLD AUTO: 9.13 K/UL — SIGNIFICANT CHANGE UP (ref 3.8–10.5)

## 2023-05-25 PROCEDURE — 99233 SBSQ HOSP IP/OBS HIGH 50: CPT | Mod: FS

## 2023-05-25 PROCEDURE — 99233 SBSQ HOSP IP/OBS HIGH 50: CPT

## 2023-05-25 RX ORDER — ALPRAZOLAM 0.25 MG
0.5 TABLET ORAL ONCE
Refills: 0 | Status: DISCONTINUED | OUTPATIENT
Start: 2023-05-25 | End: 2023-05-26

## 2023-05-25 RX ORDER — DIPHENOXYLATE HCL/ATROPINE 2.5-.025MG
1 TABLET ORAL ONCE
Refills: 0 | Status: DISCONTINUED | OUTPATIENT
Start: 2023-05-25 | End: 2023-05-25

## 2023-05-25 RX ORDER — SODIUM CHLORIDE 9 MG/ML
1000 INJECTION INTRAMUSCULAR; INTRAVENOUS; SUBCUTANEOUS
Refills: 0 | Status: DISCONTINUED | OUTPATIENT
Start: 2023-05-25 | End: 2023-05-27

## 2023-05-25 RX ORDER — DIPHENOXYLATE HCL/ATROPINE 2.5-.025MG
2 TABLET ORAL
Refills: 0 | Status: DISCONTINUED | OUTPATIENT
Start: 2023-05-25 | End: 2023-05-28

## 2023-05-25 RX ORDER — AMLODIPINE BESYLATE 2.5 MG/1
10 TABLET ORAL DAILY
Refills: 0 | Status: DISCONTINUED | OUTPATIENT
Start: 2023-05-25 | End: 2023-05-28

## 2023-05-25 RX ADMIN — CARVEDILOL PHOSPHATE 12.5 MILLIGRAM(S): 80 CAPSULE, EXTENDED RELEASE ORAL at 17:36

## 2023-05-25 RX ADMIN — Medication 2 TABLET(S): at 17:58

## 2023-05-25 RX ADMIN — TAMSULOSIN HYDROCHLORIDE 0.4 MILLIGRAM(S): 0.4 CAPSULE ORAL at 21:21

## 2023-05-25 RX ADMIN — Medication 2 TABLET(S): at 23:32

## 2023-05-25 RX ADMIN — HEPARIN SODIUM 5000 UNIT(S): 5000 INJECTION INTRAVENOUS; SUBCUTANEOUS at 12:28

## 2023-05-25 RX ADMIN — LEVETIRACETAM 400 MILLIGRAM(S): 250 TABLET, FILM COATED ORAL at 05:21

## 2023-05-25 RX ADMIN — CARVEDILOL PHOSPHATE 12.5 MILLIGRAM(S): 80 CAPSULE, EXTENDED RELEASE ORAL at 05:21

## 2023-05-25 RX ADMIN — CHLORHEXIDINE GLUCONATE 1 APPLICATION(S): 213 SOLUTION TOPICAL at 12:27

## 2023-05-25 RX ADMIN — HEPARIN SODIUM 5000 UNIT(S): 5000 INJECTION INTRAVENOUS; SUBCUTANEOUS at 21:21

## 2023-05-25 RX ADMIN — LEVETIRACETAM 400 MILLIGRAM(S): 250 TABLET, FILM COATED ORAL at 17:36

## 2023-05-25 RX ADMIN — Medication 1 TABLET(S): at 06:39

## 2023-05-25 RX ADMIN — HEPARIN SODIUM 5000 UNIT(S): 5000 INJECTION INTRAVENOUS; SUBCUTANEOUS at 05:21

## 2023-05-25 RX ADMIN — AMLODIPINE BESYLATE 10 MILLIGRAM(S): 2.5 TABLET ORAL at 11:52

## 2023-05-25 NOTE — PROGRESS NOTE ADULT - SUBJECTIVE AND OBJECTIVE BOX
JONATHAN MONAHANLLO  ----------------------------------------  The patient was seen at bedside. Patient with subdural hematoma. Offered no complaints except episodes of diarrhea.    Vital Signs Last 24 Hrs  T(C): 36.6 (25 May 2023 08:29), Max: 37.6 (24 May 2023 23:00)  T(F): 97.8 (25 May 2023 08:29), Max: 99.6 (24 May 2023 23:00)  HR: 86 (25 May 2023 08:29) (60 - 86)  BP: 164/70 (25 May 2023 08:29) (132/106 - 176/66)  BP(mean): 86 (24 May 2023 18:00) (73 - 115)  RR: 18 (25 May 2023 08:29) (16 - 24)  SpO2: 98% (25 May 2023 08:29) (84% - 100%)    Parameters below as of 25 May 2023 08:29  Patient On (Oxygen Delivery Method): room air    PHYSICAL EXAMINATION:  ----------------------------------------  General appearance: No acute distress, Awake, Alert  HEENT: Normocephalic, Atraumatic, Conjunctiva clear, EOMI  Neck: Supple, No JVD, No tenderness  Lungs: Breath sound equal bilaterally, No wheezes, No rales  Cardiovascular: S1S2, Regular rhythm  Abdomen: Soft, Nontender, Nondistended, No guarding/rebound, Positive bowel sounds, Well healed abdominal scars  Extremities: No clubbing, No cyanosis, No edema, No calf tenderness, Left upper extremity AV graft  Neuro: Strength equal bilaterally, No tremors  Psychiatric: Appropriate mood, Normal affect    LABORATORY STUDIES:  ----------------------------------------             12.5   9.13  )-----------( 202      ( 25 May 2023 05:28 )             40.5     05-25    140  |  106  |  21.2<H>  ----------------------------<  99  3.8   |  22.0  |  1.69<H>    Ca    9.3      25 May 2023 05:28  Phos  2.6     05-25  Mg     2.0     05-25    CARDIAC MARKERS ( 25 May 2023 05:28 )  x     / x     / 5227 U/L / x     / 4.8 ng/mL  CARDIAC MARKERS ( 24 May 2023 04:00 )  x     / 0.03 ng/mL / x     / x     / x      CARDIAC MARKERS ( 23 May 2023 17:52 )  x     / x     / 3268 U/L / x     / 8.0 ng/mL    Culture - Urine (collected 22 May 2023 20:09)  Source: Clean Catch Clean Catch (Midstream)  Final Report (23 May 2023 22:21):    No growth    Culture - Blood (collected 22 May 2023 18:35)  Source: .Blood Blood-Peripheral  Preliminary Report (24 May 2023 01:02):    No growth to date.    Culture - Blood (collected 22 May 2023 18:30)  Source: .Blood Blood-Peripheral  Preliminary Report (24 May 2023 01:02):    No growth to date.    MEDICATIONS  (STANDING):  amLODIPine   Tablet 10 milliGRAM(s) Oral daily  carvedilol 12.5 milliGRAM(s) Oral every 12 hours  chlorhexidine 2% Cloths 1 Application(s) Topical daily  heparin   Injectable 5000 Unit(s) SubCutaneous every 8 hours  levETIRAcetam  IVPB 1000 milliGRAM(s) IV Intermittent every 12 hours  sodium chloride 0.9%. 1000 milliLiter(s) (75 mL/Hr) IV Continuous <Continuous>  tamsulosin 0.4 milliGRAM(s) Oral at bedtime    MEDICATIONS  (PRN):  diphenoxylate/atropine 2 Tablet(s) Oral two times a day PRN Diarrhea      ASSESSMENT / PLAN:  ----------------------------------------  62M with a history of atrial fibrillation, coronary artery disease with bypass surgery, cardiomyopathy, bioprosthetic aortic and mitral valve replacements, stroke, hypertension, aortic dissection repair with bowel ischemia requiring bowel resection, and renal failure which had resolved presented after being found unresponsive in the kitchen for an unknown period of time. Initiation CT of the head was notable for the prior stroke and an acute on chronic subdural hematoma. The patient was intubated and admitted to the Surgical Intensive Care Unit. Repeat CT of the head was without significant change and the patient was subsequently extubated. EEG monitoring noted occasional epileptiform discharges but no seizures. The patient was transferred to the Medical service for further management.    Acute on chronic subdural hematoma - Repeat CT of the head was without significant change. Continue to monitor status with serial neurological examinations. On prophylactic levetiracetam. Pending MRI of the brain.    Fall / Syncope - CT scan and Xray studies were without acute injuries. Evidence of rhabdomyolysis with elevated CPK. For initiation of intravenous fluids. Physical Therapy evaluation noted, no skilled needs.    Acute kidney injury - The patient reported previously requiring hemodialysis after his aortic dissection repair but it was discontinued after his renal function improved. Continue to monitor renal functions and urine output. Torsemide was held.    Atrial fibrillation - On carvedilol. Apixaban held in the setting of intracranial hemorrhage.    Coronary artery disease / Cardiomyopathy - Antiplatelet agents held in the setting of intracranial hemorrhage. For resumption of atorvastatin once CPK has improved. Serial troponin levels were not elevated. Echocardiogram noted preserved left ventricular systolic function with grade II diastolic dysfunction and possible echodensity on the mitral valve. Blood cultures are without growth. Cardiology consultation noted, for transesophageal echocardiogram.

## 2023-05-25 NOTE — PHYSICAL THERAPY INITIAL EVALUATION ADULT - ADDITIONAL COMMENTS
Pt lives in a house with 0 steps to enter and 0 stairs inside.  Pt owns medical equipment: SAC, RW, Commode, SHower Chair, W/C  Pt lives with: Alone

## 2023-05-25 NOTE — PROGRESS NOTE ADULT - SUBJECTIVE AND OBJECTIVE BOX
[facility]                                                         CARDIOLOGY AT [facility]                                                                  [address], [place] [address]                                                         Telephone: [phone]. Fax:[phone]                                                                             PROGRESS NOTE    Reason for follow up:  Chronic afib  Update:   MUSTAPHA cancelled and rescheduled for tomorrow, as patient eat a full breakfast.      Review of symptoms:   Cardiac:  No chest pain. No dyspnea. No palpitations.  Respiratory: no cough. No dyspnea  Gastrointestinal: No diarrhea. No abdominal pain. No bleeding.   Neuro: No focal neuro complaints.    Vitals:  T(C): 36.6 (05-25-23 @ 08:29), Max: 37.6 (05-24-23 @ 23:00)  HR: 86 (05-25-23 @ 08:29) (60 - 86)  BP: 164/70 (05-25-23 @ 08:29) (132/106 - 176/66)  RR: 18 (05-25-23 @ 08:29) (16 - 24)  SpO2: 98% (05-25-23 @ 08:29) (84% - 100%)  I&O's Summary  24 May 2023 07:01  -  25 May 2023 07:00  --------------------------------------------------------  IN: 995 mL / OUT: 200 mL / NET: 795 mL  Weight (kg): 87 (05-23 @ 00:25)    PHYSICAL EXAM:  Appearance: Comfortable. No acute distress  HEENT:  Atraumatic. Normocephalic.  Normal oral mucosa  Neurologic: A & O x 3, no gross focal deficits.  Cardiovascular: irregular S1 S2, No murmur, no rubs/gallops. No JVD  Respiratory: Lungs clear to auscultation, unlabored - diminished bilaterally   Gastrointestinal:  Soft, Non-tender, + BS  Lower Extremities: + Peripheral Pulses, No clubbing, cyanosis, or edema  Psychiatry: Patient is calm. No agitation.   Skin: warm and dry.    CURRENT CARDIAC MEDICATIONS:  amLODIPine   Tablet 10 milliGRAM(s) Oral daily  carvedilol 12.5 milliGRAM(s) Oral every 12 hours    CURRENT OTHER MEDICATIONS:  levETIRAcetam  IVPB 1000 milliGRAM(s) IV Intermittent every 12 hours  chlorhexidine 2% Cloths 1 Application(s) Topical daily  heparin   Injectable 5000 Unit(s) SubCutaneous every 8 hours  sodium chloride 0.9%. 1000 milliLiter(s) (75 mL/Hr) IV Continuous <Continuous>  tamsulosin 0.4 milliGRAM(s) Oral at bedtime    LABS:	 	  ( 25 May 2023 05:28 )  Troponin T  X    ,  CPK  5227<H>, CKMB  X    , BNP X        , ( 24 May 2023 04:00 )  Troponin T  0.03 ,  CPK  X    , CKMB  X    , BNP X        , ( 23 May 2023 17:52 )  Troponin T  X    ,  CPK  3268<H>, CKMB  X    , BNP X                              12.5   9.13  )-----------( 202      ( 25 May 2023 05:28 )             40.5     05-25    140  |  106  |  21.2<H>  ----------------------------<  99  3.8   |  22.0  |  1.69<H>    Ca    9.3      25 May 2023 05:28  Phos  2.6     05-25  Mg     2.0     05-25      PT/INR/PTT ( 23 May 2023 04:40 )                       :                       :      12.9         :       35.5                  .        .                   .              .           .       1.11        .                                      TSH: Thyroid Stimulating Hormone, Serum: 0.93 uIU/mL  TELEMETRY:                                                                   [facility]                                                         CARDIOLOGY AT [facility]                                                                  [address] [facility] [facility], [place] [place] [address]                                                         Telephone: [phone]. Fax:[phone]                                                                             PROGRESS NOTE    Reason for follow up:  Chronic afib  Update:   No Jared for now  + carotid bruit - Bilateral carotid ultrasound pending  Review of symptoms:   Cardiac:  No chest pain. No dyspnea. No palpitations.  Respiratory: no cough. No dyspnea  Gastrointestinal: No diarrhea. No abdominal pain. No bleeding.   Neuro: No focal neuro complaints.    Vitals:  T(C): 36.6 (05-25-23 @ 08:29), Max: 37.6 (05-24-23 @ 23:00)  HR: 86 (05-25-23 @ 08:29) (60 - 86)  BP: 164/70 (05-25-23 @ 08:29) (132/106 - 176/66)  RR: 18 (05-25-23 @ 08:29) (16 - 24)  SpO2: 98% (05-25-23 @ 08:29) (84% - 100%)  I&O's Summary  24 May 2023 07:01  -  25 May 2023 07:00  --------------------------------------------------------  IN: 995 mL / OUT: 200 mL / NET: 795 mL  Weight (kg): 87 (05-23 @ 00:25)    PHYSICAL EXAM:  Appearance: Comfortable. No acute distress  HEENT:  Atraumatic. Normocephalic.  Normal oral mucosa  Neurologic: A & O x 3, no gross focal deficits.  Cardiovascular: irregular S1 S2, No murmur, no rubs/gallops. No JVD  Respiratory: Lungs clear to auscultation, unlabored - diminished bilaterally   Gastrointestinal:  Soft, Non-tender, + BS  Lower Extremities: + Peripheral Pulses, No clubbing, cyanosis, or edema  Psychiatry: Patient is calm. No agitation.   Skin: warm and dry.    CURRENT CARDIAC MEDICATIONS:  amLODIPine   Tablet 10 milliGRAM(s) Oral daily  carvedilol 12.5 milliGRAM(s) Oral every 12 hours    CURRENT OTHER MEDICATIONS:  levETIRAcetam  IVPB 1000 milliGRAM(s) IV Intermittent every 12 hours  chlorhexidine 2% Cloths 1 Application(s) Topical daily  heparin   Injectable 5000 Unit(s) SubCutaneous every 8 hours  sodium chloride 0.9%. 1000 milliLiter(s) (75 mL/Hr) IV Continuous <Continuous>  tamsulosin 0.4 milliGRAM(s) Oral at bedtime    LABS:	 	  ( 25 May 2023 05:28 )  Troponin T  X    ,  CPK  5227<H>, CKMB  X    , BNP X        , ( 24 May 2023 04:00 )  Troponin T  0.03 ,  CPK  X    , CKMB  X    , BNP X        , ( 23 May 2023 17:52 )  Troponin T  X    ,  CPK  3268<H>, CKMB  X    , BNP X                              12.5   9.13  )-----------( 202      ( 25 May 2023 05:28 )             40.5     05-25    140  |  106  |  21.2<H>  ----------------------------<  99  3.8   |  22.0  |  1.69<H>    Ca    9.3      25 May 2023 05:28  Phos  2.6     05-25  Mg     2.0     05-25      PT/INR/PTT ( 23 May 2023 04:40 )                       :                       :      12.9         :       35.5                  .        .                   .              .           .       1.11        .                                      TSH: Thyroid Stimulating Hormone, Serum: 0.93 uIU/mL  TELEMETRY:                                                                   [facility]                                                         CARDIOLOGY AT [facility]                                                                  [address], [place] [place] [address]                                                         Telephone: [phone]. Fax:[phone]                                                                             PROGRESS NOTE    Reason for follow up:  Chronic afib  Update:   No Jared for now  + carotid bruit - Bilateral carotid ultrasound pending  Review of symptoms:   Cardiac:  No chest pain. No dyspnea. No palpitations.  Respiratory: no cough. No dyspnea  Gastrointestinal: No diarrhea. No abdominal pain. No bleeding.   Neuro: No focal neuro complaints.    Vitals:  T(C): 36.6 (05-25-23 @ 08:29), Max: 37.6 (05-24-23 @ 23:00)  HR: 86 (05-25-23 @ 08:29) (60 - 86)  BP: 164/70 (05-25-23 @ 08:29) (132/106 - 176/66)  RR: 18 (05-25-23 @ 08:29) (16 - 24)  SpO2: 98% (05-25-23 @ 08:29) (84% - 100%)  I&O's Summary  24 May 2023 07:01  -  25 May 2023 07:00  --------------------------------------------------------  IN: 995 mL / OUT: 200 mL / NET: 795 mL  Weight (kg): 87 (05-23 @ 00:25)    PHYSICAL EXAM:  Appearance: Comfortable. No acute distress  HEENT:  Atraumatic. Normocephalic.  Normal oral mucosa  Neurologic: A & O x 3, no gross focal deficits.  Cardiovascular: irregular S1 S2, +3/6 aortic and 2/6 mitral murmur, no rubs/gallops. No JVD  Respiratory: Lungs clear to auscultation, unlabored - diminished bilaterally   Gastrointestinal:  Soft, Non-tender, + BS  Lower Extremities: + Peripheral Pulses, No clubbing, cyanosis, or edema  Psychiatry: Patient is calm. No agitation.   Skin: warm and dry.    CURRENT CARDIAC MEDICATIONS:  amLODIPine   Tablet 10 milliGRAM(s) Oral daily  carvedilol 12.5 milliGRAM(s) Oral every 12 hours    CURRENT OTHER MEDICATIONS:  levETIRAcetam  IVPB 1000 milliGRAM(s) IV Intermittent every 12 hours  chlorhexidine 2% Cloths 1 Application(s) Topical daily  heparin   Injectable 5000 Unit(s) SubCutaneous every 8 hours  sodium chloride 0.9%. 1000 milliLiter(s) (75 mL/Hr) IV Continuous <Continuous>  tamsulosin 0.4 milliGRAM(s) Oral at bedtime    LABS:	 	  ( 25 May 2023 05:28 )  Troponin T  X    ,  CPK  5227<H>, CKMB  X    , BNP X        , ( 24 May 2023 04:00 )  Troponin T  0.03 ,  CPK  X    , CKMB  X    , BNP X        , ( 23 May 2023 17:52 )  Troponin T  X    ,  CPK  3268<H>, CKMB  X    , BNP X                              12.5   9.13  )-----------( 202      ( 25 May 2023 05:28 )             40.5     05-25    140  |  106  |  21.2<H>  ----------------------------<  99  3.8   |  22.0  |  1.69<H>    Ca    9.3      25 May 2023 05:28  Phos  2.6     05-25  Mg     2.0     05-25      PT/INR/PTT ( 23 May 2023 04:40 )                       :                       :      12.9         :       35.5                  .        .                   .              .           .       1.11        .                                      TSH: Thyroid Stimulating Hormone, Serum: 0.93 uIU/mL  TELEMETRY:

## 2023-05-25 NOTE — PROGRESS NOTE ADULT - ASSESSMENT
63yo M w/ PMH aortic dissection s/p repair (2010) now stable, bowel ischemia s/p bowel resection and ostomy with reversal, bio AVR/MVR, HFpEF, afib on Eliquis who presented with AMS. Found intubated and sedated, no family at bedside. HPI per ED note was that patient. was last seen normal 48hours ago. Family reported that neighbor below his house heard a large thud. Family came to find patient altered and appeared to be sleeping. with facial droop and am weakness. Patient was diagnosed with  intracranial  hematoma.  We were asked to see patient for Possible mobile echodensity on the posterior mitral valve. Cannot r/o endocarditis.  Blood cultures have been negative  rule out vegetation.

## 2023-05-25 NOTE — PROGRESS NOTE ADULT - PROBLEM SELECTOR PLAN 3
Syncope vs fall vs seizure  States hx of seizure on keppra  States he follows with neurology Dr. Gore   EEG revealed no epileptic activity.   Neuro following Syncope vs fall vs seizure  States hx of seizure on keppra  States he follows with neurology Dr. Gore  EEG revealed no epileptic activity.   Neuro following

## 2023-05-25 NOTE — PROGRESS NOTE ADULT - PROBLEM SELECTOR PLAN 2
C spine has been cleared  Patient able to lye flat  Will refer for MUSTAPHA tomorrow.  NPO tonight Euvolemic on exam  Low na diet  intake and out put  Was on torsemide 20mg qd.    Creatinine 1.61 - stable  Monitor for fluid overload and ongoing diuresis if needed.    GDMT:   Continue coreg, no Diuretic (prn) as patient is euvolemic, patient with renal failure noted - no ACE/ARB   Euvolemic on exam  TTE pending complete read for possible MUSTAPHA in patient or outpatient. Euvolemic on exam  Low na diet  intake and out put  Was on torsemide 20mg qd.    Creatinine 1.61 - stable  Monitor for fluid overload and ongoing diuresis if needed.    GDMT:   Continue coreg, no Diuretic (prn) as patient is euvolemic, patient with renal failure noted - no ACE/ARB   Euvolemic on exam  As discussed with Dr. De Leon will reviewed possible MUSTAPHA in patient or outpatient.

## 2023-05-25 NOTE — PROGRESS NOTE ADULT - NS ATTEND AMEND GEN_ALL_CORE FT
Patient seen and examined on bedside.    he has history of seizures.  on keppra,  and prior Bio AVR and MVR.  he cam ewith fall , said he was in kitchen. and does not remember what happened next. he had wet him self.  his brother came from basement and found him on the floor.  he was confuised and altered and post ictal .  no witnessed seizures thougth.    he had sub-dural hematoma.  Routine TTE showed Possible echo density on the prosthetic mitral valve.   he has significant MR murmru radiating to the back and axillary. and also significnat aortic stenosis murmur,  he has bilateral carotid bruit. right is worse. ptich is different then aortic stenosis murmur so apart from radiation of murmur this could be stenosis.  1) Syncope : fall: His fall is from seizure.  long term on anticoagulation for ,afib . high risk of future falls and bleeding. high bleedni risk. recommend 2watchman evaln as outpaietn. for now, we will wait for neuro when ok to to resume anticoagulation for atrial fibrillation .    2): Carotid US to evaluate for carotid Stenosis.   3): he need outpaitent 4 weeks MCOt monitor to ecvaluate for any arrhyhtmias.   4)  he has significant MR murmur and aortic stenosis murmur.   he has history of moderate PVL in 2021.  ccurrent echo reviewed by me . possible significant MR with pisa seen. and also there might be intra valvular MR.  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 or atleast elvated gradients.   Discuss outpaiotent and correlate with symptoms.  recommend MUSTAPHA to assess valve before watchman.  if significnat valvul dysfunction , he may need reop and can do cortney clip during open heart surgery.   if no significant valve dysfunction , then Ep evaln for watchman as dicussed above.  5) Mobile echo density: Appears calcified and degenerative cvalve changes and or suture.  no clinical evidence of CVA.  and no bactermia. no suspipcion of endocarditis.  NO further work up as of now. MUSTAPHA as outpatient for prostehetic MV regurgitaiton and prostehtic aortic stenosis assessment.  will sign off. please cvall for further questions.

## 2023-05-25 NOTE — PROGRESS NOTE ADULT - PROBLEM SELECTOR PLAN 1
Euvolemic on exam  Low na diet  intake and out put  Was on torsemide 20mg qd.    Creatinine 1.61 - stable  Monitor for fluid overload and ongoing diuresis if needed.    GDMT:   Continue coreg, no Diuretic (prn) as patient is euvolemic, patient with renal failure noted - no ACE/ARB Continue telemetry  Was on Eliquis  NOAC when approved by neuro  ON heparin q 8 DVT prophylaxis.

## 2023-05-26 LAB
ANION GAP SERPL CALC-SCNC: 11 MMOL/L — SIGNIFICANT CHANGE UP (ref 5–17)
BUN SERPL-MCNC: 23.1 MG/DL — HIGH (ref 8–20)
CALCIUM SERPL-MCNC: 7.9 MG/DL — LOW (ref 8.4–10.5)
CHLORIDE SERPL-SCNC: 111 MMOL/L — HIGH (ref 96–108)
CK MB CFR SERPL CALC: 3 NG/ML — SIGNIFICANT CHANGE UP (ref 0–6.7)
CK SERPL-CCNC: 3070 U/L — HIGH (ref 30–200)
CO2 SERPL-SCNC: 19 MMOL/L — LOW (ref 22–29)
CREAT SERPL-MCNC: 1.53 MG/DL — HIGH (ref 0.5–1.3)
EGFR: 51 ML/MIN/1.73M2 — LOW
GLUCOSE SERPL-MCNC: 99 MG/DL — SIGNIFICANT CHANGE UP (ref 70–99)
POTASSIUM SERPL-MCNC: 3.7 MMOL/L — SIGNIFICANT CHANGE UP (ref 3.5–5.3)
POTASSIUM SERPL-SCNC: 3.7 MMOL/L — SIGNIFICANT CHANGE UP (ref 3.5–5.3)
SODIUM SERPL-SCNC: 141 MMOL/L — SIGNIFICANT CHANGE UP (ref 135–145)

## 2023-05-26 PROCEDURE — 99233 SBSQ HOSP IP/OBS HIGH 50: CPT

## 2023-05-26 PROCEDURE — 70551 MRI BRAIN STEM W/O DYE: CPT | Mod: 26

## 2023-05-26 PROCEDURE — 93880 EXTRACRANIAL BILAT STUDY: CPT | Mod: 26

## 2023-05-26 RX ADMIN — Medication 0.5 MILLIGRAM(S): at 02:32

## 2023-05-26 RX ADMIN — HEPARIN SODIUM 5000 UNIT(S): 5000 INJECTION INTRAVENOUS; SUBCUTANEOUS at 22:06

## 2023-05-26 RX ADMIN — Medication 2 TABLET(S): at 14:20

## 2023-05-26 RX ADMIN — CARVEDILOL PHOSPHATE 12.5 MILLIGRAM(S): 80 CAPSULE, EXTENDED RELEASE ORAL at 05:03

## 2023-05-26 RX ADMIN — LEVETIRACETAM 400 MILLIGRAM(S): 250 TABLET, FILM COATED ORAL at 05:02

## 2023-05-26 RX ADMIN — CARVEDILOL PHOSPHATE 12.5 MILLIGRAM(S): 80 CAPSULE, EXTENDED RELEASE ORAL at 16:44

## 2023-05-26 RX ADMIN — AMLODIPINE BESYLATE 10 MILLIGRAM(S): 2.5 TABLET ORAL at 05:03

## 2023-05-26 RX ADMIN — CHLORHEXIDINE GLUCONATE 1 APPLICATION(S): 213 SOLUTION TOPICAL at 11:37

## 2023-05-26 RX ADMIN — HEPARIN SODIUM 5000 UNIT(S): 5000 INJECTION INTRAVENOUS; SUBCUTANEOUS at 05:04

## 2023-05-26 RX ADMIN — TAMSULOSIN HYDROCHLORIDE 0.4 MILLIGRAM(S): 0.4 CAPSULE ORAL at 22:06

## 2023-05-26 RX ADMIN — HEPARIN SODIUM 5000 UNIT(S): 5000 INJECTION INTRAVENOUS; SUBCUTANEOUS at 14:20

## 2023-05-26 RX ADMIN — LEVETIRACETAM 400 MILLIGRAM(S): 250 TABLET, FILM COATED ORAL at 16:45

## 2023-05-26 NOTE — PROGRESS NOTE ADULT - ASSESSMENT
INCOMPLETE    ASSESSMENT: 61yo M w/ PMH aortic dissection s/p repair (2010) now stable, bowel ischemia s/p bowel resection and ostomy with reversal, bio AVR/MVR, HFpEF, afib on Eliquis who presented with AMS. Patient is following by neurosurgery   for acute on chronic SDH. Pt. found to have with slurred speech after being extubated. Neurological exam significant for aphasia. CTH without significant interval change of a left frontal subdural collection. CTP head with Core infarct in the right posterior parietal and temporal lobes as seen on CT head. No territory at ischemic risk. Rule out stroke, screen for seizure and causes of metabolic encephalopathy.       NEURO: Continue close monitoring for neurologic deterioration, with stroke checks q 4 hour, permissive HTN, currently neurologically stable 130-160 mmHg, avoid rapid fluctuations, please obtain lipid panel and  A1C and titrate statin to LDL goal less than 70, MRI head noted, EEG revealed no epileptic activity. Aphasia improving.  Physical therapy/OT/Speech eval/treatment.     ANTITHROMBOTIC THERAPY: on hold at the moment, until cleared by neurosurgery and trauma/SICU, pending neuro imaging    PULMONARY:  protecting airway, saturating well on room air    CARDIOVASCULAR:  TTE noted, recommend MUSTAPHA, continue cardiac monitoring, cardiology following                             SBP goal: 120-160 mmHg    GASTROINTESTINAL:  dysphagia screen pass, monitor for aspiration      Diet: DASH diet    RENAL: BUN/Cr 23.1/1.53, elevated, trend please, monitor urine output , hydrate as tolerated     Na Goal: Greater than 135     [place]: N    HEMATOLOGY: H/H 12.5/40.5, Platelets 202, slightly anemic, trend CBC, Patient should have all age and risk appropriate malignancy screening with PCP or sooner     DVT ppx: Heparin s.c [] LMWH [] SCD's    ID: afebrile, no leukocytosis, monitor for infection      DISPOSITION: Rehab or home depending on PT eval once stable and workup is complete      CORE MEASURES:        Admission NIHSS:      TNK: [] YES [x] NO      LDL/HDL/A1C: pending     Depression Screen: pending     Statin Therapy: pending     Dysphagia Screen: [x] PASS [] FAIL      Smoking [x] YES [] NO      Afib [x] YES [] NO     Stroke Education [x] YES [] NO    Obtain screening lower extremity venous ultrasound in patients who meet 1 or more of the following criteria as patient is high risk for DVT/PE on admission:   [] History of DVT/PE  []Hypercoagulable states (Factor V Leiden, Cancer, OCP, etc. )  []Prolonged immobility (hemiplegia/hemiparesis/post operative or any other extended immobilization)  [] Transferred from outside facility (Rehab or Long term care)  [] Age </= to 50 ASSESSMENT: 63yo M w/ PMH aortic dissection s/p repair (2010) now stable, bowel ischemia s/p bowel resection and ostomy with reversal, bio AVR/MVR, HFpEF, afib on Eliquis who presented with AMS. Patient is following by neurosurgery   for acute on chronic SDH. Pt. found to have with slurred speech after being extubated. Neurological exam significant for aphasia. CTH without significant interval change of a left frontal subdural collection. CTP head with Core infarct in the right posterior parietal and temporal lobes as seen on CT head. No territory at ischemic risk. MRI revealed right posterior MCA infarction, right occipital small infarct. No acute stroke, metabolic encephalopathy resolving.        NEURO: Continue close monitoring for neurologic deterioration, with stroke checks q 4 hour, permissive HTN, currently neurologically stable 130-160 mmHg, avoid rapid fluctuations, please obtain lipid panel and  A1C and titrate statin to LDL goal less than 70, MRI  noted, EEG revealed no epileptic activity. Aphasia improving.  Physical therapy/OT/Speech eval/treatment.     ANTITHROMBOTIC THERAPY: on hold at the moment, until cleared by neurosurgery     PULMONARY:  protecting airway, saturating well on room air    CARDIOVASCULAR:  TTE noted, recommend MUSTAPHA, continue cardiac monitoring, cardiology following                             SBP goal: 120-160 mmHg    GASTROINTESTINAL:  dysphagia screen pass, monitor for aspiration      Diet: DASH diet    RENAL: BUN/Cr 23.1/1.53, elevated, trend please, monitor urine output , hydrate as tolerated     Na Goal: Greater than 135     [place]: N    HEMATOLOGY: H/H 12.5/40.5, Platelets 202, slightly anemic, trend CBC, Patient should have all age and risk appropriate malignancy screening with PCP or sooner     DVT ppx: Heparin s.c [] LMWH [] SCD's    ID: afebrile, no leukocytosis, monitor for infection    OTHER: images and plan was discussed at the bedside by attending, concerns addressed, questions answered       DISPOSITION: Rehab or home depending on PT eval once stable and workup is complete      CORE MEASURES:        Admission NIHSS:      TNK: [] YES [x] NO      LDL/HDL/A1C: pending     Depression Screen: pending     Statin Therapy: pending     Dysphagia Screen: [x] PASS [] FAIL      Smoking [x] YES [] NO      Afib [x] YES [] NO     Stroke Education [x] YES [] NO    Obtain screening lower extremity venous ultrasound in patients who meet 1 or more of the following criteria as patient is high risk for DVT/PE on admission:   [] History of DVT/PE  []Hypercoagulable states (Factor V Leiden, Cancer, OCP, etc. )  []Prolonged immobility (hemiplegia/hemiparesis/post operative or any other extended immobilization)  [] Transferred from outside facility (Rehab or Long term care)  [] Age </= to 50 ASSESSMENT: 63yo M w/ PMH aortic dissection s/p repair (2010) now stable, bowel ischemia s/p bowel resection and ostomy with reversal, bio AVR/MVR, HFpEF, afib on Eliquis who presented with AMS. Patient is following by neurosurgery   for acute on chronic SDH. Pt. found to have with slurred speech after being extubated. Neurological exam significant for aphasia. CTH without significant interval change of a left frontal subdural collection. CTP head with Core infarct in the right posterior parietal and temporal lobes as seen on CT head. No territory at ischemic risk. MRI revealed right posterior MCA infarction, right occipital small infarct. No acute stroke, metabolic encephalopathy resolving.        NEURO: Continue close monitoring for neurologic deterioration, with stroke checks q 4 hour, permissive HTN, currently neurologically stable 130-160 mmHg, avoid rapid fluctuations, please obtain lipid panel and  A1C and titrate statin to LDL goal less than 70, MRI  noted, EEG revealed epileptiform discharges with epileptogenic potential in left frontal region.  Continue Keppra. Aphasia improving.  Physical therapy/OT/Speech eval/treatment.     ANTITHROMBOTIC THERAPY: on hold at the moment, until cleared by neurosurgery     PULMONARY:  protecting airway, saturating well on room air    CARDIOVASCULAR:  TTE noted, recommend MUSTAPHA, continue cardiac monitoring, cardiology following                             SBP goal: 120-160 mmHg    GASTROINTESTINAL:  dysphagia screen pass, monitor for aspiration      Diet: DASH diet    RENAL: BUN/Cr 23.1/1.53, elevated, trend please, monitor urine output , hydrate as tolerated     Na Goal: Greater than 135     [place]: N    HEMATOLOGY: H/H 12.5/40.5, Platelets 202, slightly anemic, trend CBC, Patient should have all age and risk appropriate malignancy screening with PCP or sooner     DVT ppx: Heparin s.c [] LMWH [] SCD's    ID: afebrile, no leukocytosis, monitor for infection    OTHER: images and plan was discussed at the bedside by attending, concerns addressed, questions answered       DISPOSITION: Rehab or home depending on PT eval once stable and workup is complete      CORE MEASURES:        Admission NIHSS:      TNK: [] YES [x] NO      LDL/HDL/A1C: pending     Depression Screen: pending     Statin Therapy: pending     Dysphagia Screen: [x] PASS [] FAIL      Smoking [x] YES [] NO      Afib [x] YES [] NO     Stroke Education [x] YES [] NO    Obtain screening lower extremity venous ultrasound in patients who meet 1 or more of the following criteria as patient is high risk for DVT/PE on admission:   [] History of DVT/PE  []Hypercoagulable states (Factor V Leiden, Cancer, OCP, etc. )  []Prolonged immobility (hemiplegia/hemiparesis/post operative or any other extended immobilization)  [] Transferred from outside facility (Rehab or Long term care)  [] Age </= to 50

## 2023-05-26 NOTE — PROGRESS NOTE ADULT - NS ATTEND AMEND GEN_ALL_CORE FT
Seen and examined with the NP.  Plan discussed with NP.  I agree with as written above with modifications as below    Subdural hematoma  AMS, resolving  EEG no seizure, potential epileptiform left frontal activity with epileptogenic potential maintain kera   MRI brain no acute stroke  anticoagulation when cleared by neurosurgery and trauma.     Stroke service to sign off   Please call for any questions    Vincenzo Meléndez MD PhD  [phone]

## 2023-05-26 NOTE — PROGRESS NOTE ADULT - SUBJECTIVE AND OBJECTIVE BOX
Preliminary note, offical recommendations pending attending review/signature     [facility] Stroke ProgressNote    CC: slurred speech/aphasia/AMS  HPI:  63yo M w/ PMH aortic dissection s/p repair (2010) now stable, bowel ischemia s/p bowel resection and ostomy with reversal, bio AVR/MVR, HFpEF, afib on Eliquis who presented with AMS. Patient with AMS and facial droop, became combative on arrival to ER and required Versed, shortly after got intubated for airway protection and transferred to ICU for monitoring. Neurosurgery was consulted for acute on chronic SDH. Stroke team called for consult after patient found with slurred speech and possible aphasia  post extubation.      SUBJECTIVE: No events overnight.  No new neurologic complaints.  ROS reported negative unless otherwise noted.    amLODIPine   Tablet 10 milliGRAM(s) Oral daily  carvedilol 12.5 milliGRAM(s) Oral every 12 hours  chlorhexidine 2% Cloths 1 Application(s) Topical daily  diphenoxylate/atropine 2 Tablet(s) Oral two times a day PRN  heparin   Injectable 5000 Unit(s) SubCutaneous every 8 hours  levETIRAcetam  IVPB 1000 milliGRAM(s) IV Intermittent every 12 hours  sodium chloride 0.9%. 1000 milliLiter(s) IV Continuous <Continuous>  tamsulosin 0.4 milliGRAM(s) Oral at bedtime      PHYSICAL EXAM:   Vital Signs Last 24 Hrs  T(C): 36.8 (26 May 2023 04:15), Max: 37 (25 May 2023 12:11)  T(F): 98.2 (26 May 2023 04:15), Max: 98.6 (25 May 2023 12:11)  HR: 59 (26 May 2023 04:15) (59 - 86)  BP: 113/58 (26 May 2023 04:15) (113/58 - 164/76)  BP(mean): --  RR: 18 (26 May 2023 04:15) (18 - 18)  SpO2: 95% (26 May 2023 04:15) (95% - 96%)    Parameters below as of 26 May 2023 04:15  Patient On (Oxygen Delivery Method): room air        General: No acute distress INCOMPLETE    NEUROLOGICAL EXAM:  Mental status: Awake, alert, oriented x3, speech fluent, follows commands, no neglect, normal memory   Cranial Nerves: No facial asymmetry, no nystagmus, no dysarthria,  tongue midline  Motor exam: Normal tone, no drift, 5/5 RUE, 5/5 RLE, 5/5 LUE, 5/5 LLE, normal fine finger movements.  Sensation: Intact to light touch   Coordination/ Gait: No dysmetria, gait not tested    LABS:                        12.5   9.13  )-----------( 202      ( 25 May 2023 05:28 )             40.5    05-26    141  |  111<H>  |  23.1<H>  ----------------------------<  99  3.7   |  19.0<L>  |  1.53<H>    Ca    7.9<L>      26 May 2023 05:00  Phos  2.6     05-25  Mg     2.0     05-25          LDL/ HDL= pending   A1C= Pending    IMAGING: Reviewed by me.     MRI 5/26/23    ,,,,,,,,read pending  Carotid D .....    TTE Echo Limited or F/U (05.23.23 @ 12:39)   1. Left ventricular ejection fraction, by visual estimation, is 60 to   65%.   2. Normal global left ventricular systolic function.   3. Spectral Doppler shows pseudonormal pattern of left ventricular   myocardial filling (Grade II diastolic dysfunction).  EEG Classification / Summary:  Abnormal EEG in the awake, drowsy, and asleep states.  -Occasional left frontotemporal sharp waves  -Occasional left frontal sharp waves  -Occasional left frontotemporal focal slowing, at times semi-rhythmic  -Mild diffuse slowing  -No electrographic seizures captured    Clinical Impression:  -Occasional left frontotemporal and left frontal epileptiform discharges indicate potentially epileptogenic foci in these regions.  -Left frontotemporal focal cerebral dysfunction can be structural or functional in etiology.  -Mild diffuse cerebral dysfunction is nonspecific in etiology.   4. Normal left ventricular internal cavity size.   5. Normal right ventricular size and function.   6. Moderately enlarged left atrium.   7. Normal right atrial size.   8. Mitral prosthesis with mild stenosis.   9. Mild to moderate mitral valve regurgitation.  10. Possible mobile echodensity on the posterior mitral valve. Cannot   rule out vegetation. Consider MUSTAPHA for further evaluation.  11. Bioprosthesis in the aortic position demonstrating normal function.  12. Mild tricuspid regurgitation.  13. Dilatation of the aortic root.  14. There is no evidence of pericardial effusion.  15. Estimated pulmonary artery systolic pressure is 42.8 mmHg assuming a   right atrial pressure of 8 mmHg, which is consistent with mild pulmonary   hypertension.      CT Head No Cont (05.23.23 @ 00:15)   IMPRESSION: Stable CT of the brain. No significant interval change of a   left frontal subdural collection.    CT Brain/ Neck  Perfusion Maps Stroke (05.22.23 @ 19:14)     CTA brain:No hemodynamically significant stenosis  CTA carotid/vertebral artery circulation: Aortic dissection extending   into the left proximal subclavian artery, and minimally into the proximal   right subclavian artery/proximal right common carotid artery, is   reidentified without change from prior exam.  There is heavy   calcification of the right carotid bulb and proximal right internal   carotid artery resulting in a focal 40% stenosis at the origin of the   right ICA. There are calcifications ofthe left carotid bulb and proximal   left internal carotid artery without hemodynamically significant stenosis.  CT Perfusion: Core infarct in the right posterior parietal and temporal   lobes, as seen on CTA head. No territory at ischemic risk.    EEG Classification / Summary 5/23-5/24/23:  Abnormal EEG in the awake, drowsy, and asleep states.  -Occasional left frontotemporal sharp waves  -Occasional left frontal sharp waves  -Occasional left frontotemporal focal slowing, at times semi-rhythmic  -Mild diffuse slowing  -No electrographic seizures captured    Clinical Impression:  -Occasional left frontotemporal and left frontal epileptiform discharges indicate potentially epileptogenic foci in these regions.  -Left frontotemporal focal cerebral dysfunction can be structural or functional in etiology.  -Mild diffuse cerebral dysfunction is nonspecific in etiology.             Preliminary note, offical recommendations pending attending review/signature     [facility] Stroke ProgressNote    CC: slurred speech/aphasia/AMS  HPI:  63yo M w/ PMH aortic dissection s/p repair (2010) now stable, bowel ischemia s/p bowel resection and ostomy with reversal, bio AVR/MVR, HFpEF, afib on Eliquis who presented with AMS. Patient with AMS and facial droop, became combative on arrival to ER and required Versed, shortly after got intubated for airway protection and transferred to ICU for monitoring. Neurosurgery was consulted for acute on chronic SDH. Stroke team called for consult after patient found with slurred speech and possible aphasia  post extubation.      SUBJECTIVE: No events overnight.  No new neurologic complaints.  ROS reported negative unless otherwise noted.    amLODIPine   Tablet 10 milliGRAM(s) Oral daily  carvedilol 12.5 milliGRAM(s) Oral every 12 hours  chlorhexidine 2% Cloths 1 Application(s) Topical daily  diphenoxylate/atropine 2 Tablet(s) Oral two times a day PRN  heparin   Injectable 5000 Unit(s) SubCutaneous every 8 hours  levETIRAcetam  IVPB 1000 milliGRAM(s) IV Intermittent every 12 hours  sodium chloride 0.9%. 1000 milliLiter(s) IV Continuous <Continuous>  tamsulosin 0.4 milliGRAM(s) Oral at bedtime      PHYSICAL EXAM:   Vital Signs Last 24 Hrs  T(C): 36.8 (26 May 2023 04:15), Max: 37 (25 May 2023 12:11)  T(F): 98.2 (26 May 2023 04:15), Max: 98.6 (25 May 2023 12:11)  HR: 59 (26 May 2023 04:15) (59 - 86)  BP: 113/58 (26 May 2023 04:15) (113/58 - 164/76)  BP(mean): --  RR: 18 (26 May 2023 04:15) (18 - 18)  SpO2: 95% (26 May 2023 04:15) (95% - 96%)    Parameters below as of 26 May 2023 04:15  Patient On (Oxygen Delivery Method): room air        General: No acute distress     NEUROLOGICAL EXAM:  Mental status: Awake, alert, oriented x3, speech fluent, follows commands, no neglect, normal memory   Cranial Nerves: slight right facial, corrects with smile, no nystagmus, no dysarthria,  tongue midline  Motor exam: Normal tone, no drift, 5/5 RUE, 5/5 RLE, 5/5 LUE, 5/5 LLE, normal fine finger movements.  Sensation: Intact to light touch   Coordination/ Gait: No dysmetria, gait not tested    LABS:                        12.5   9.13  )-----------( 202      ( 25 May 2023 05:28 )             40.5    05-26    141  |  111<H>  |  23.1<H>  ----------------------------<  99  3.7   |  19.0<L>  |  1.53<H>    Ca    7.9<L>      26 May 2023 05:00  Phos  2.6     05-25  Mg     2.0     05-25          LDL/ HDL= pending   A1C= Pending    IMAGING: Reviewed by me.     MR Head No Cont (05.26.23 @ 03:15)   1. Left anterior cranial fossa small subdural hematoma. Subarachnoid   space hemorrhage in the underlying sulci. These findings appear unchanged   from 5/22/2023  2. Right posterior MCA distribution large remote infarction  3. Right occipital small remote infarction  4. Cerebral hemispheric white matter lesions suggestive of ischemic white   matter disease, advanced for age  5. Mild differential signal hyperintensity of the left hippocampal   formation not fully meeting imaging criteria for mesial temporal sclerosis    TTE Echo Limited or F/U (05.23.23 @ 12:39)   1. Left ventricular ejection fraction, by visual estimation, is 60 to   65%.   2. Normal global left ventricular systolic function.   3. Spectral Doppler shows pseudonormal pattern of left ventricular   myocardial filling (Grade II diastolic dysfunction).  EEG Classification / Summary:  Abnormal EEG in the awake, drowsy, and asleep states.  -Occasional left frontotemporal sharp waves  -Occasional left frontal sharp waves  -Occasional left frontotemporal focal slowing, at times semi-rhythmic  -Mild diffuse slowing  -No electrographic seizures captured    Clinical Impression:  -Occasional left frontotemporal and left frontal epileptiform discharges indicate potentially epileptogenic foci in these regions.  -Left frontotemporal focal cerebral dysfunction can be structural or functional in etiology.  -Mild diffuse cerebral dysfunction is nonspecific in etiology.   4. Normal left ventricular internal cavity size.   5. Normal right ventricular size and function.   6. Moderately enlarged left atrium.   7. Normal right atrial size.   8. Mitral prosthesis with mild stenosis.   9. Mild to moderate mitral valve regurgitation.  10. Possible mobile echodensity on the posterior mitral valve. Cannot   rule out vegetation. Consider MUSTAPHA for further evaluation.  11. Bioprosthesis in the aortic position demonstrating normal function.  12. Mild tricuspid regurgitation.  13. Dilatation of the aortic root.  14. There is no evidence of pericardial effusion.  15. Estimated pulmonary artery systolic pressure is 42.8 mmHg assuming a   right atrial pressure of 8 mmHg, which is consistent with mild pulmonary   hypertension.      CT Head No Cont (05.23.23 @ 00:15)   IMPRESSION: Stable CT of the brain. No significant interval change of a   left frontal subdural collection.    CT Brain/ Neck  Perfusion Maps Stroke (05.22.23 @ 19:14)     CTA brain:No hemodynamically significant stenosis  CTA carotid/vertebral artery circulation: Aortic dissection extending   into the left proximal subclavian artery, and minimally into the proximal   right subclavian artery/proximal right common carotid artery, is   reidentified without change from prior exam.  There is heavy   calcification of the right carotid bulb and proximal right internal   carotid artery resulting in a focal 40% stenosis at the origin of the   right ICA. There are calcifications ofthe left carotid bulb and proximal   left internal carotid artery without hemodynamically significant stenosis.  CT Perfusion: Core infarct in the right posterior parietal and temporal   lobes, as seen on CTA head. No territory at ischemic risk.    EEG Classification / Summary 5/23-5/24/23:  Abnormal EEG in the awake, drowsy, and asleep states.  -Occasional left frontotemporal sharp waves  -Occasional left frontal sharp waves  -Occasional left frontotemporal focal slowing, at times semi-rhythmic  -Mild diffuse slowing  -No electrographic seizures captured    Clinical Impression:  -Occasional left frontotemporal and left frontal epileptiform discharges indicate potentially epileptogenic foci in these regions.  -Left frontotemporal focal cerebral dysfunction can be structural or functional in etiology.  -Mild diffuse cerebral dysfunction is nonspecific in etiology.             [facility] Stroke Progress Note    CC: slurred speech/aphasia/AMS  HPI:  61yo M w/ PMH aortic dissection s/p repair (2010) now stable, bowel ischemia s/p bowel resection and ostomy with reversal, bio AVR/MVR, HFpEF, afib on Eliquis who presented with AMS. Patient with AMS and facial droop, became combative on arrival to ER and required Versed, shortly after got intubated for airway protection and transferred to ICU for monitoring. Neurosurgery was consulted for acute on chronic SDH. Stroke team called for consult after patient found with slurred speech and possible aphasia  post extubation.      SUBJECTIVE: No events overnight.  No new neurologic complaints.  ROS reported negative unless otherwise noted.    amLODIPine   Tablet 10 milliGRAM(s) Oral daily  carvedilol 12.5 milliGRAM(s) Oral every 12 hours  chlorhexidine 2% Cloths 1 Application(s) Topical daily  diphenoxylate/atropine 2 Tablet(s) Oral two times a day PRN  heparin   Injectable 5000 Unit(s) SubCutaneous every 8 hours  levETIRAcetam  IVPB 1000 milliGRAM(s) IV Intermittent every 12 hours  sodium chloride 0.9%. 1000 milliLiter(s) IV Continuous <Continuous>  tamsulosin 0.4 milliGRAM(s) Oral at bedtime      PHYSICAL EXAM:   Vital Signs Last 24 Hrs  T(C): 36.8 (26 May 2023 04:15), Max: 37 (25 May 2023 12:11)  T(F): 98.2 (26 May 2023 04:15), Max: 98.6 (25 May 2023 12:11)  HR: 59 (26 May 2023 04:15) (59 - 86)  BP: 113/58 (26 May 2023 04:15) (113/58 - 164/76)  BP(mean): --  RR: 18 (26 May 2023 04:15) (18 - 18)  SpO2: 95% (26 May 2023 04:15) (95% - 96%)    Parameters below as of 26 May 2023 04:15  Patient On (Oxygen Delivery Method): room air    General: No acute distress     NEUROLOGICAL EXAM:  Mental status: Awake, alert, oriented x3, speech fluent, follows commands, no neglect, normal memory   Cranial Nerves: slight right facial, corrects with smile, no nystagmus, no dysarthria,  tongue midline  Motor exam: Normal tone, no drift, 5/5 RUE, 5/5 RLE, 5/5 LUE, 5/5 LLE, normal fine finger movements.  Sensation: Intact to light touch   Coordination/ Gait: No dysmetria, gait not tested    LABS:                        12.5   9.13  )-----------( 202      ( 25 May 2023 05:28 )             40.5    05-26    141  |  111<H>  |  23.1<H>  ----------------------------<  99  3.7   |  19.0<L>  |  1.53<H>    Ca    7.9<L>      26 May 2023 05:00  Phos  2.6     05-25  Mg     2.0     05-25          LDL/ HDL= pending   A1C= Pending    IMAGING: Reviewed by me.     MR Head No Cont (05.26.23 @ 03:15)   1. Left anterior cranial fossa small subdural hematoma. Subarachnoid   space hemorrhage in the underlying sulci. These findings appear unchanged   from 5/22/2023  2. Right posterior MCA distribution large remote infarction  3. Right occipital small remote infarction  4. Cerebral hemispheric white matter lesions suggestive of ischemic white   matter disease, advanced for age  5. Mild differential signal hyperintensity of the left hippocampal   formation not fully meeting imaging criteria for mesial temporal sclerosis    TTE Echo Limited or F/U (05.23.23 @ 12:39)   1. Left ventricular ejection fraction, by visual estimation, is 60 to   65%.   2. Normal global left ventricular systolic function.   3. Spectral Doppler shows pseudonormal pattern of left ventricular   myocardial filling (Grade II diastolic dysfunction).  EEG Classification / Summary:  Abnormal EEG in the awake, drowsy, and asleep states.  -Occasional left frontotemporal sharp waves  -Occasional left frontal sharp waves  -Occasional left frontotemporal focal slowing, at times semi-rhythmic  -Mild diffuse slowing  -No electrographic seizures captured    Clinical Impression:  -Occasional left frontotemporal and left frontal epileptiform discharges indicate potentially epileptogenic foci in these regions.  -Left frontotemporal focal cerebral dysfunction can be structural or functional in etiology.  -Mild diffuse cerebral dysfunction is nonspecific in etiology.   4. Normal left ventricular internal cavity size.   5. Normal right ventricular size and function.   6. Moderately enlarged left atrium.   7. Normal right atrial size.   8. Mitral prosthesis with mild stenosis.   9. Mild to moderate mitral valve regurgitation.  10. Possible mobile echodensity on the posterior mitral valve. Cannot   rule out vegetation. Consider MUSTAPHA for further evaluation.  11. Bioprosthesis in the aortic position demonstrating normal function.  12. Mild tricuspid regurgitation.  13. Dilatation of the aortic root.  14. There is no evidence of pericardial effusion.  15. Estimated pulmonary artery systolic pressure is 42.8 mmHg assuming a   right atrial pressure of 8 mmHg, which is consistent with mild pulmonary   hypertension.      CT Head No Cont (05.23.23 @ 00:15)   IMPRESSION: Stable CT of the brain. No significant interval change of a   left frontal subdural collection.    CT Brain/ Neck  Perfusion Maps Stroke (05.22.23 @ 19:14)     CTA brain:No hemodynamically significant stenosis  CTA carotid/vertebral artery circulation: Aortic dissection extending   into the left proximal subclavian artery, and minimally into the proximal   right subclavian artery/proximal right common carotid artery, is   reidentified without change from prior exam.  There is heavy   calcification of the right carotid bulb and proximal right internal   carotid artery resulting in a focal 40% stenosis at the origin of the   right ICA. There are calcifications ofthe left carotid bulb and proximal   left internal carotid artery without hemodynamically significant stenosis.  CT Perfusion: Core infarct in the right posterior parietal and temporal   lobes, as seen on CTA head. No territory at ischemic risk.    EEG Classification / Summary 5/23-5/24/23:  Abnormal EEG in the awake, drowsy, and asleep states.  -Occasional left frontotemporal sharp waves  -Occasional left frontal sharp waves  -Occasional left frontotemporal focal slowing, at times semi-rhythmic  -Mild diffuse slowing  -No electrographic seizures captured    Clinical Impression:  -Occasional left frontotemporal and left frontal epileptiform discharges indicate potentially epileptogenic foci in these regions.  -Left frontotemporal focal cerebral dysfunction can be structural or functional in etiology.  -Mild diffuse cerebral dysfunction is nonspecific in etiology.

## 2023-05-26 NOTE — PROGRESS NOTE ADULT - SUBJECTIVE AND OBJECTIVE BOX
JONATHAN GIBSON  ----------------------------------------  The patient was seen at bedside. Patient with subdural hematoma. Offered no complaints. Denied headache or dizziness.    Vital Signs Last 24 Hrs  T(C): 36.5 (26 May 2023 11:25), Max: 37 (25 May 2023 12:11)  T(F): 97.7 (26 May 2023 11:25), Max: 98.6 (25 May 2023 12:11)  HR: 60 (26 May 2023 11:25) (59 - 86)  BP: 125/70 (26 May 2023 11:25) (113/58 - 164/76)  BP(mean): --  RR: 18 (26 May 2023 11:25) (18 - 18)  SpO2: 96% (26 May 2023 11:25) (95% - 96%)    Parameters below as of 26 May 2023 11:25  Patient On (Oxygen Delivery Method): room air    PHYSICAL EXAMINATION:  ----------------------------------------  General appearance: No acute distress, Awake, Alert  HEENT: Normocephalic, Atraumatic, Conjunctiva clear, EOMI  Neck: Supple, No JVD, No tenderness  Lungs: Breath sound equal bilaterally, No wheezes, No rales  Cardiovascular: S1S2, Regular rhythm  Abdomen: Soft, Nontender, Nondistended, No guarding/rebound, Positive bowel sounds, Well healed abdominal scars  Extremities: No clubbing, No cyanosis, No edema, No calf tenderness, Left upper extremity AV graft  Neuro: Strength equal bilaterally, No tremors  Psychiatric: Appropriate mood, Normal affect    LABORATORY STUDIES:  ----------------------------------------             12.5   9.13  )-----------( 202      ( 25 May 2023 05:28 )             40.5     05-26    141  |  111<H>  |  23.1<H>  ----------------------------<  99  3.7   |  19.0<L>  |  1.53<H>    Ca    7.9<L>      26 May 2023 05:00  Phos  2.6     05-25  Mg     2.0     05-25    MEDICATIONS  (STANDING):  amLODIPine   Tablet 10 milliGRAM(s) Oral daily  carvedilol 12.5 milliGRAM(s) Oral every 12 hours  chlorhexidine 2% Cloths 1 Application(s) Topical daily  heparin   Injectable 5000 Unit(s) SubCutaneous every 8 hours  levETIRAcetam  IVPB 1000 milliGRAM(s) IV Intermittent every 12 hours  sodium chloride 0.9%. 1000 milliLiter(s) (75 mL/Hr) IV Continuous <Continuous>  tamsulosin 0.4 milliGRAM(s) Oral at bedtime    MEDICATIONS  (PRN):  diphenoxylate/atropine 2 Tablet(s) Oral two times a day PRN Diarrhea      ASSESSMENT / PLAN:  ----------------------------------------  62M with a history of atrial fibrillation, coronary artery disease with bypass surgery, cardiomyopathy, bioprosthetic aortic and mitral valve replacements, stroke, hypertension, aortic dissection repair with bowel ischemia requiring bowel resection, and renal failure which had resolved presented after being found unresponsive in the kitchen for an unknown period of time. Initiation CT of the head was notable for the prior stroke and an acute on chronic subdural hematoma. The patient was intubated and admitted to the Surgical Intensive Care Unit. Repeat CT of the head was without significant change and the patient was subsequently extubated. EEG monitoring noted occasional epileptiform discharges but no seizures. The patient was transferred to the Medical service for further management.    Acute on chronic subdural hematoma - Repeat CT of the head and MRI were without significant change. Continue to monitor status with serial neurological examinations.    History of seizure disorder - On levetiracetam.    Fall / Syncope - CT scan and Xray studies were without acute injuries. Evidence of rhabdomyolysis with elevated CPK. For initiation of intravenous fluids. Physical Therapy evaluation noted, no skilled needs.    Acute kidney injury - The patient reported previously requiring hemodialysis after his cardiac but it was discontinued after his renal function improved. Continue to monitor renal functions and urine output. Torsemide was held and intravenous fluids being administered. Repeat CPK level improved.    Atrial fibrillation - On carvedilol. Apixaban held in the setting of intracranial hemorrhage.    Coronary artery disease / Cardiomyopathy - Antiplatelet agents held in the setting of intracranial hemorrhage. For resumption of atorvastatin when CPK has improved. Serial troponin levels were not elevated. Echocardiogram noted preserved left ventricular systolic function with grade II diastolic dysfunction and possible echodensity on the mitral valve. Blood cultures are without growth. Cardiology consultation noted, for outpatient evaluation and transesophageal echocardiogram.

## 2023-05-27 LAB
A1C WITH ESTIMATED AVERAGE GLUCOSE RESULT: 5.2 % — SIGNIFICANT CHANGE UP (ref 4–5.6)
ANION GAP SERPL CALC-SCNC: 11 MMOL/L — SIGNIFICANT CHANGE UP (ref 5–17)
BUN SERPL-MCNC: 21.1 MG/DL — HIGH (ref 8–20)
CALCIUM SERPL-MCNC: 8.3 MG/DL — LOW (ref 8.4–10.5)
CHLORIDE SERPL-SCNC: 111 MMOL/L — HIGH (ref 96–108)
CHOLEST SERPL-MCNC: 96 MG/DL — SIGNIFICANT CHANGE UP
CK MB CFR SERPL CALC: 2.5 NG/ML — SIGNIFICANT CHANGE UP (ref 0–6.7)
CK SERPL-CCNC: 2234 U/L — HIGH (ref 30–200)
CO2 SERPL-SCNC: 21 MMOL/L — LOW (ref 22–29)
CREAT SERPL-MCNC: 1.59 MG/DL — HIGH (ref 0.5–1.3)
EGFR: 49 ML/MIN/1.73M2 — LOW
ESTIMATED AVERAGE GLUCOSE: 103 MG/DL — SIGNIFICANT CHANGE UP (ref 68–114)
GLUCOSE SERPL-MCNC: 98 MG/DL — SIGNIFICANT CHANGE UP (ref 70–99)
HDLC SERPL-MCNC: 36 MG/DL — LOW
LIPID PNL WITH DIRECT LDL SERPL: 42 MG/DL — SIGNIFICANT CHANGE UP
NON HDL CHOLESTEROL: 60 MG/DL — SIGNIFICANT CHANGE UP
POTASSIUM SERPL-MCNC: 4.1 MMOL/L — SIGNIFICANT CHANGE UP (ref 3.5–5.3)
POTASSIUM SERPL-SCNC: 4.1 MMOL/L — SIGNIFICANT CHANGE UP (ref 3.5–5.3)
SODIUM SERPL-SCNC: 142 MMOL/L — SIGNIFICANT CHANGE UP (ref 135–145)
TRIGL SERPL-MCNC: 91 MG/DL — SIGNIFICANT CHANGE UP

## 2023-05-27 PROCEDURE — 99232 SBSQ HOSP IP/OBS MODERATE 35: CPT

## 2023-05-27 RX ORDER — SODIUM CHLORIDE 9 MG/ML
1000 INJECTION INTRAMUSCULAR; INTRAVENOUS; SUBCUTANEOUS
Refills: 0 | Status: DISCONTINUED | OUTPATIENT
Start: 2023-05-27 | End: 2023-05-28

## 2023-05-27 RX ADMIN — SODIUM CHLORIDE 75 MILLILITER(S): 9 INJECTION INTRAMUSCULAR; INTRAVENOUS; SUBCUTANEOUS at 14:01

## 2023-05-27 RX ADMIN — CARVEDILOL PHOSPHATE 12.5 MILLIGRAM(S): 80 CAPSULE, EXTENDED RELEASE ORAL at 17:46

## 2023-05-27 RX ADMIN — TAMSULOSIN HYDROCHLORIDE 0.4 MILLIGRAM(S): 0.4 CAPSULE ORAL at 21:06

## 2023-05-27 RX ADMIN — LEVETIRACETAM 400 MILLIGRAM(S): 250 TABLET, FILM COATED ORAL at 17:45

## 2023-05-27 RX ADMIN — HEPARIN SODIUM 5000 UNIT(S): 5000 INJECTION INTRAVENOUS; SUBCUTANEOUS at 05:37

## 2023-05-27 RX ADMIN — CARVEDILOL PHOSPHATE 12.5 MILLIGRAM(S): 80 CAPSULE, EXTENDED RELEASE ORAL at 05:37

## 2023-05-27 RX ADMIN — LEVETIRACETAM 400 MILLIGRAM(S): 250 TABLET, FILM COATED ORAL at 05:37

## 2023-05-27 RX ADMIN — HEPARIN SODIUM 5000 UNIT(S): 5000 INJECTION INTRAVENOUS; SUBCUTANEOUS at 14:01

## 2023-05-27 RX ADMIN — CHLORHEXIDINE GLUCONATE 1 APPLICATION(S): 213 SOLUTION TOPICAL at 14:04

## 2023-05-27 RX ADMIN — AMLODIPINE BESYLATE 10 MILLIGRAM(S): 2.5 TABLET ORAL at 05:37

## 2023-05-27 NOTE — PROGRESS NOTE ADULT - SUBJECTIVE AND OBJECTIVE BOX
[facility] Division of Hospital Medicine    Chief Complaint:  found down    SUBJECTIVE / OVERNIGHT EVENTS: Patient seen and examined. HE states he is feeling better. Ambulating to bathroom    Patient denies chest pain, SOB, abd pain, N/V, fever, chills, dysuria or any other complaints. All remainder ROS negative.     MEDICATIONS  (STANDING):  amLODIPine   Tablet 10 milliGRAM(s) Oral daily  carvedilol 12.5 milliGRAM(s) Oral every 12 hours  chlorhexidine 2% Cloths 1 Application(s) Topical daily  heparin   Injectable 5000 Unit(s) SubCutaneous every 8 hours  levETIRAcetam  IVPB 1000 milliGRAM(s) IV Intermittent every 12 hours  sodium chloride 0.9%. 1000 milliLiter(s) (75 mL/Hr) IV Continuous <Continuous>  tamsulosin 0.4 milliGRAM(s) Oral at bedtime    MEDICATIONS  (PRN):  diphenoxylate/atropine 2 Tablet(s) Oral two times a day PRN Diarrhea        I&O's Summary    26 May 2023 07:01  -  27 May 2023 07:00  --------------------------------------------------------  IN: 825 mL / OUT: 0 mL / NET: 825 mL        PHYSICAL EXAM:  Vital Signs Last 24 Hrs  T(C): 37 (27 May 2023 13:30), Max: 37 (27 May 2023 13:30)  T(F): 98.6 (27 May 2023 13:30), Max: 98.6 (27 May 2023 13:30)  HR: 63 (27 May 2023 13:30) (63 - 68)  BP: 138/64 (27 May 2023 13:30) (133/59 - 161/70)  BP(mean): --  RR: 18 (27 May 2023 13:30) (18 - 18)  SpO2: 95% (27 May 2023 13:30) (92% - 98%)    Parameters below as of 27 May 2023 13:30  Patient On (Oxygen Delivery Method): room air            CONSTITUTIONAL: NAD  ENMT: Moist oral mucosa, no pharyngeal injection or exudates;  RESPIRATORY: Normal respiratory effort; lungs are clear to auscultation bilaterally  CARDIOVASCULAR: Regular rate and rhythm, normal S1 and S2, no murmur/rub/gallop; No lower extremity edema  ABDOMEN: Nontender to palpation, normoactive bowel sounds, no rebound/guarding;  MUSCLOSKELETAL:   no joint swelling or tenderness to palpation  PSYCH: A+O to person, place, and time; affect appropriate  NEUROLOGY: CN 2-12 are intact and symmetric; no gross sensory deficits;   SKIN: No rashes; no palpable lesions    LABS:    05-27    142  |  111<H>  |  21.1<H>  ----------------------------<  98  4.1   |  21.0<L>  |  1.59<H>    Ca    8.3<L>      27 May 2023 04:55        CARDIAC MARKERS ( 27 May 2023 04:55 )  x     / x     / 2234 U/L / x     / 2.5 ng/mL  CARDIAC MARKERS ( 26 May 2023 05:00 )  x     / x     / 3070 U/L / x     / 3.0 ng/mL          CAPILLARY BLOOD GLUCOSE            RADIOLOGY & ADDITIONAL TESTS:  Results Reviewed:   Imaging Personally Reviewed:  Electrocardiogram Personally Reviewed:

## 2023-05-27 NOTE — PROGRESS NOTE ADULT - ASSESSMENT
62M with a history of atrial fibrillation, coronary artery disease with bypass surgery, cardiomyopathy, bioprosthetic aortic and mitral valve replacements, stroke, hypertension, aortic dissection repair with bowel ischemia requiring bowel resection, and renal failure which had resolved presented after being found unresponsive in the kitchen for an unknown period of time. Initiation CT of the head was notable for the prior stroke and an acute on chronic subdural hematoma. The patient was intubated and admitted to the Surgical Intensive Care Unit. Repeat CT of the head was without significant change and the patient was subsequently extubated. EEG monitoring noted occasional epileptiform discharges but no seizures. The patient was transferred to the Medical service for further management.    Acute on chronic subdural hematoma  - Repeat CT of the head and MRI were without significant change.   - Continue to monitor status with serial neurological examinations.    History of seizure disorder   - Continue On levetiracetam.    Rhabdomyolysis due to Fall / Syncope  - CT scan and Xray studies were without acute injuries.   -  Physical Therapy evaluation noted, no skilled needs.  - Continue on IV fluids one more day    Acute kidney injury   - The patient reported previously requiring hemodialysis after his cardiac but it was discontinued after his renal function improved.  - Continue to monitor renal functions and urine output.   - Torsemide was held and intravenous fluids being administered.     Atrial fibrillation   - On carvedilol. Apixaban held in the setting of intracranial hemorrhage.  - will need outpt follow up for Watchman    Coronary artery disease / Cardiomyopathy   - Antiplatelet agents held in the setting of intracranial hemorrhage.   - For resumption of atorvastatin when CPK has improved. Serial troponin levels were not elevated.   - Echocardiogram noted preserved left ventricular systolic function with grade II diastolic dysfunction and possible echodensity on the mitral valve. Blood cultures are without growth. Cardiology consultation noted, for outpatient evaluation and transesophageal echocardiogram.    Cartorid Artery Stenosis   50-69% bilateral ICA stenosis noted on US, however CTA shows 40% on the right and no significant stenosis on the left  no anti-plt due to bleed above  - will refer to vascular as out patient      dvt ppx: SCDs  Dispo: possibe dc home tomorrow.

## 2023-05-28 ENCOUNTER — TRANSCRIPTION ENCOUNTER (OUTPATIENT)
Age: 63
End: 2023-05-28

## 2023-05-28 VITALS
OXYGEN SATURATION: 95 % | DIASTOLIC BLOOD PRESSURE: 60 MMHG | HEART RATE: 60 BPM | RESPIRATION RATE: 17 BRPM | SYSTOLIC BLOOD PRESSURE: 148 MMHG | TEMPERATURE: 99 F

## 2023-05-28 DIAGNOSIS — I50.9 HEART FAILURE, UNSPECIFIED: ICD-10-CM

## 2023-05-28 DIAGNOSIS — I48.91 UNSPECIFIED ATRIAL FIBRILLATION: ICD-10-CM

## 2023-05-28 DIAGNOSIS — I34.0 NONRHEUMATIC MITRAL (VALVE) INSUFFICIENCY: ICD-10-CM

## 2023-05-28 LAB
ANION GAP SERPL CALC-SCNC: 11 MMOL/L — SIGNIFICANT CHANGE UP (ref 5–17)
BUN SERPL-MCNC: 20.5 MG/DL — HIGH (ref 8–20)
CALCIUM SERPL-MCNC: 7.2 MG/DL — LOW (ref 8.4–10.5)
CHLORIDE SERPL-SCNC: 109 MMOL/L — HIGH (ref 96–108)
CK MB CFR SERPL CALC: 1.6 NG/ML — SIGNIFICANT CHANGE UP (ref 0–6.7)
CK SERPL-CCNC: 1147 U/L — HIGH (ref 30–200)
CO2 SERPL-SCNC: 17 MMOL/L — LOW (ref 22–29)
CREAT SERPL-MCNC: 1.27 MG/DL — SIGNIFICANT CHANGE UP (ref 0.5–1.3)
CULTURE RESULTS: SIGNIFICANT CHANGE UP
CULTURE RESULTS: SIGNIFICANT CHANGE UP
EGFR: 64 ML/MIN/1.73M2 — SIGNIFICANT CHANGE UP
GLUCOSE SERPL-MCNC: 82 MG/DL — SIGNIFICANT CHANGE UP (ref 70–99)
MAGNESIUM SERPL-MCNC: 1.4 MG/DL — LOW (ref 1.6–2.6)
PHOSPHATE SERPL-MCNC: 2 MG/DL — LOW (ref 2.4–4.7)
POTASSIUM SERPL-MCNC: 3.7 MMOL/L — SIGNIFICANT CHANGE UP (ref 3.5–5.3)
POTASSIUM SERPL-SCNC: 3.7 MMOL/L — SIGNIFICANT CHANGE UP (ref 3.5–5.3)
SODIUM SERPL-SCNC: 137 MMOL/L — SIGNIFICANT CHANGE UP (ref 135–145)
SPECIMEN SOURCE: SIGNIFICANT CHANGE UP
SPECIMEN SOURCE: SIGNIFICANT CHANGE UP

## 2023-05-28 PROCEDURE — 87040 BLOOD CULTURE FOR BACTERIA: CPT

## 2023-05-28 PROCEDURE — 81001 URINALYSIS AUTO W/SCOPE: CPT

## 2023-05-28 PROCEDURE — 82803 BLOOD GASES ANY COMBINATION: CPT

## 2023-05-28 PROCEDURE — 80061 LIPID PANEL: CPT

## 2023-05-28 PROCEDURE — 70450 CT HEAD/BRAIN W/O DYE: CPT | Mod: MA

## 2023-05-28 PROCEDURE — 85610 PROTHROMBIN TIME: CPT

## 2023-05-28 PROCEDURE — 96376 TX/PRO/DX INJ SAME DRUG ADON: CPT

## 2023-05-28 PROCEDURE — 93308 TTE F-UP OR LMTD: CPT

## 2023-05-28 PROCEDURE — 85730 THROMBOPLASTIN TIME PARTIAL: CPT

## 2023-05-28 PROCEDURE — 95700 EEG CONT REC W/VID EEG TECH: CPT

## 2023-05-28 PROCEDURE — 84132 ASSAY OF SERUM POTASSIUM: CPT

## 2023-05-28 PROCEDURE — 86850 RBC ANTIBODY SCREEN: CPT

## 2023-05-28 PROCEDURE — 94760 N-INVAS EAR/PLS OXIMETRY 1: CPT

## 2023-05-28 PROCEDURE — 83036 HEMOGLOBIN GLYCOSYLATED A1C: CPT

## 2023-05-28 PROCEDURE — 94003 VENT MGMT INPAT SUBQ DAY: CPT

## 2023-05-28 PROCEDURE — 99233 SBSQ HOSP IP/OBS HIGH 50: CPT | Mod: FS

## 2023-05-28 PROCEDURE — 84295 ASSAY OF SERUM SODIUM: CPT

## 2023-05-28 PROCEDURE — 85018 HEMOGLOBIN: CPT

## 2023-05-28 PROCEDURE — 82435 ASSAY OF BLOOD CHLORIDE: CPT

## 2023-05-28 PROCEDURE — 84443 ASSAY THYROID STIM HORMONE: CPT

## 2023-05-28 PROCEDURE — 82962 GLUCOSE BLOOD TEST: CPT

## 2023-05-28 PROCEDURE — 82947 ASSAY GLUCOSE BLOOD QUANT: CPT

## 2023-05-28 PROCEDURE — 93880 EXTRACRANIAL BILAT STUDY: CPT

## 2023-05-28 PROCEDURE — 99285 EMERGENCY DEPT VISIT HI MDM: CPT

## 2023-05-28 PROCEDURE — 82550 ASSAY OF CK (CPK): CPT

## 2023-05-28 PROCEDURE — 80053 COMPREHEN METABOLIC PANEL: CPT

## 2023-05-28 PROCEDURE — 96372 THER/PROPH/DIAG INJ SC/IM: CPT

## 2023-05-28 PROCEDURE — 84484 ASSAY OF TROPONIN QUANT: CPT

## 2023-05-28 PROCEDURE — 87086 URINE CULTURE/COLONY COUNT: CPT

## 2023-05-28 PROCEDURE — 86900 BLOOD TYPING SEROLOGIC ABO: CPT

## 2023-05-28 PROCEDURE — 87641 MR-STAPH DNA AMP PROBE: CPT

## 2023-05-28 PROCEDURE — 71260 CT THORAX DX C+: CPT | Mod: MA

## 2023-05-28 PROCEDURE — 84439 ASSAY OF FREE THYROXINE: CPT

## 2023-05-28 PROCEDURE — 85025 COMPLETE CBC W/AUTO DIFF WBC: CPT

## 2023-05-28 PROCEDURE — 80048 BASIC METABOLIC PNL TOTAL CA: CPT

## 2023-05-28 PROCEDURE — 82330 ASSAY OF CALCIUM: CPT

## 2023-05-28 PROCEDURE — 70498 CT ANGIOGRAPHY NECK: CPT | Mod: MA

## 2023-05-28 PROCEDURE — 93010 ELECTROCARDIOGRAM REPORT: CPT

## 2023-05-28 PROCEDURE — 83605 ASSAY OF LACTIC ACID: CPT

## 2023-05-28 PROCEDURE — 73060 X-RAY EXAM OF HUMERUS: CPT

## 2023-05-28 PROCEDURE — 95813 EEG EXTND MNTR 61-119 MIN: CPT

## 2023-05-28 PROCEDURE — 0042T: CPT | Mod: MA

## 2023-05-28 PROCEDURE — 82553 CREATINE MB FRACTION: CPT

## 2023-05-28 PROCEDURE — 74177 CT ABD & PELVIS W/CONTRAST: CPT | Mod: MA

## 2023-05-28 PROCEDURE — 87640 STAPH A DNA AMP PROBE: CPT

## 2023-05-28 PROCEDURE — 0225U NFCT DS DNA&RNA 21 SARSCOV2: CPT

## 2023-05-28 PROCEDURE — 94002 VENT MGMT INPAT INIT DAY: CPT

## 2023-05-28 PROCEDURE — 86901 BLOOD TYPING SEROLOGIC RH(D): CPT

## 2023-05-28 PROCEDURE — 73030 X-RAY EXAM OF SHOULDER: CPT

## 2023-05-28 PROCEDURE — 70496 CT ANGIOGRAPHY HEAD: CPT | Mod: MA

## 2023-05-28 PROCEDURE — 84480 ASSAY TRIIODOTHYRONINE (T3): CPT

## 2023-05-28 PROCEDURE — 83735 ASSAY OF MAGNESIUM: CPT

## 2023-05-28 PROCEDURE — 36415 COLL VENOUS BLD VENIPUNCTURE: CPT

## 2023-05-28 PROCEDURE — 71045 X-RAY EXAM CHEST 1 VIEW: CPT

## 2023-05-28 PROCEDURE — 84100 ASSAY OF PHOSPHORUS: CPT

## 2023-05-28 PROCEDURE — 95714 VEEG EA 12-26 HR UNMNTR: CPT

## 2023-05-28 PROCEDURE — 70551 MRI BRAIN STEM W/O DYE: CPT

## 2023-05-28 PROCEDURE — 85014 HEMATOCRIT: CPT

## 2023-05-28 PROCEDURE — 80307 DRUG TEST PRSMV CHEM ANLYZR: CPT

## 2023-05-28 PROCEDURE — 96375 TX/PRO/DX INJ NEW DRUG ADDON: CPT

## 2023-05-28 PROCEDURE — 93005 ELECTROCARDIOGRAM TRACING: CPT

## 2023-05-28 PROCEDURE — 96374 THER/PROPH/DIAG INJ IV PUSH: CPT

## 2023-05-28 PROCEDURE — 84436 ASSAY OF TOTAL THYROXINE: CPT

## 2023-05-28 PROCEDURE — 99239 HOSP IP/OBS DSCHRG MGMT >30: CPT

## 2023-05-28 RX ORDER — LEVETIRACETAM 250 MG/1
1 TABLET, FILM COATED ORAL
Qty: 60 | Refills: 0
Start: 2023-05-28 | End: 2023-06-26

## 2023-05-28 RX ORDER — CARVEDILOL PHOSPHATE 80 MG/1
1 CAPSULE, EXTENDED RELEASE ORAL
Qty: 60 | Refills: 0
Start: 2023-05-28 | End: 2023-06-26

## 2023-05-28 RX ORDER — SODIUM,POTASSIUM PHOSPHATES 278-250MG
1 POWDER IN PACKET (EA) ORAL
Refills: 0 | Status: DISCONTINUED | OUTPATIENT
Start: 2023-05-28 | End: 2023-05-28

## 2023-05-28 RX ORDER — SEVELAMER CARBONATE 2400 MG/1
1 POWDER, FOR SUSPENSION ORAL
Qty: 0 | Refills: 0 | DISCHARGE

## 2023-05-28 RX ORDER — AMLODIPINE BESYLATE 2.5 MG/1
1 TABLET ORAL
Qty: 30 | Refills: 0
Start: 2023-05-28 | End: 2023-06-26

## 2023-05-28 RX ORDER — ASPIRIN/CALCIUM CARB/MAGNESIUM 324 MG
1 TABLET ORAL
Qty: 0 | Refills: 0 | DISCHARGE

## 2023-05-28 RX ORDER — LEVETIRACETAM 250 MG/1
1000 TABLET, FILM COATED ORAL
Refills: 0 | Status: DISCONTINUED | OUTPATIENT
Start: 2023-05-28 | End: 2023-05-28

## 2023-05-28 RX ORDER — LEVETIRACETAM 250 MG/1
1 TABLET, FILM COATED ORAL
Qty: 0 | Refills: 0 | DISCHARGE

## 2023-05-28 RX ORDER — AMLODIPINE BESYLATE 2.5 MG/1
1 TABLET ORAL
Qty: 0 | Refills: 0 | DISCHARGE

## 2023-05-28 RX ORDER — LEVETIRACETAM 250 MG/1
1000 TABLET, FILM COATED ORAL EVERY 12 HOURS
Refills: 0 | Status: DISCONTINUED | OUTPATIENT
Start: 2023-05-28 | End: 2023-05-28

## 2023-05-28 RX ORDER — APIXABAN 2.5 MG/1
1 TABLET, FILM COATED ORAL
Qty: 0 | Refills: 0 | DISCHARGE

## 2023-05-28 RX ORDER — MAGNESIUM SULFATE 500 MG/ML
2 VIAL (ML) INJECTION ONCE
Refills: 0 | Status: COMPLETED | OUTPATIENT
Start: 2023-05-28 | End: 2023-05-28

## 2023-05-28 RX ADMIN — CHLORHEXIDINE GLUCONATE 1 APPLICATION(S): 213 SOLUTION TOPICAL at 12:13

## 2023-05-28 RX ADMIN — Medication 1 PACKET(S): at 08:53

## 2023-05-28 RX ADMIN — Medication 25 GRAM(S): at 08:43

## 2023-05-28 RX ADMIN — CARVEDILOL PHOSPHATE 12.5 MILLIGRAM(S): 80 CAPSULE, EXTENDED RELEASE ORAL at 05:11

## 2023-05-28 RX ADMIN — Medication 2 TABLET(S): at 14:07

## 2023-05-28 RX ADMIN — AMLODIPINE BESYLATE 10 MILLIGRAM(S): 2.5 TABLET ORAL at 05:11

## 2023-05-28 RX ADMIN — LEVETIRACETAM 400 MILLIGRAM(S): 250 TABLET, FILM COATED ORAL at 05:11

## 2023-05-28 NOTE — PROGRESS NOTE ADULT - PROVIDER SPECIALTY LIST ADULT
Hospitalist
Hospitalist
Neurology
Neurosurgery
Neurology
Hospitalist
Neurosurgery
SICU
Hospitalist
SICU
Cardiology
Cardiology

## 2023-05-28 NOTE — DISCHARGE NOTE PROVIDER - PROVIDER TOKENS
PROVIDER:[TOKEN:[[id number]],FOLLOWUP:[2 weeks]],PROVIDER:[TOKEN:[[id number]],FOLLOWUP:[2 weeks]]

## 2023-05-28 NOTE — DISCHARGE NOTE PROVIDER - NSDCFUSCHEDAPPT_GEN_ALL_CORE_FT
Dago Reyes  [facility]  NEPHRO [address]  Scheduled Appointment: 06/02/2023    Felice Frankel  [facility]  CARDIOLOGY [address]  Scheduled Appointment: 06/08/2023    Vincenzo Meléndez  [facility]  NEUROLOGY [address]  Scheduled Appointment: 06/14/2023

## 2023-05-28 NOTE — PROGRESS NOTE ADULT - NS ATTEND AMEND GEN_ALL_CORE FT
Sinus with competing junctional.  good chronmotropic response. no further cardiac work u0p  outpaitent 4 weeks event monitpr  MUSTAPHA as outpaitent to assess the MR and Aortic valve stenosis.  and outapitent watAchmn ealn for fall and seizures. and fall risk  No further in-patient cardiac work-up/management is needed.  Follow-up in cardiology office in 2 weeks.

## 2023-05-28 NOTE — DISCHARGE NOTE PROVIDER - CARE PROVIDER_API CALL
Zev Meek  Neurosurgery  [address] [place] [address]  Phone: [phone]  Fax: [phone]  Follow Up Time: 2 weeks    Felice Frankel  Interventional Cardiology  [address] [facility] [address]  [place] [address]  Phone: [phone]  Fax: [phone]  Follow Up Time: 2 weeks

## 2023-05-28 NOTE — PROGRESS NOTE ADULT - NS ATTEND OPT1 GEN_ALL_CORE
I attest my time as attending is greater than 50% of the total combined time spent on qualifying patient care activities by the PA/NP and attending.
I independently performed the documented:
I independently performed the documented:

## 2023-05-28 NOTE — PROGRESS NOTE ADULT - PROBLEM SELECTOR PLAN 3
.  - HFpEF  - Euvolemic on exam  - cont coreg, torsemide 20mg PO every other day   - follow up with Dr. Frankel in 1-2 weeks

## 2023-05-28 NOTE — PROGRESS NOTE ADULT - SUBJECTIVE AND OBJECTIVE BOX
[facility]                                                         CARDIOLOGY AT [facility]                                                                  [address] [facility], [place] [address]                                                         Telephone: [phone]. Fax:[phone]                                                                             PROGRESS NOTE    Reason for follow up: HFpEF, MV regurg, Afib  Update: Called back to review possible junctional rhythm on telemetry.   SR with junctional escape, PACs.   Magnesium 1.4 on labs, repleted.   No chest pain, no shortness of breath, no headache.         Review of symptoms:   Cardiac:  No chest pain. No dyspnea. No palpitations.  Respiratory: no cough. No dyspnea  Gastrointestinal: No diarrhea. No abdominal pain. No bleeding.   Neuro: No focal neuro complaints.        Vitals:  T(C): 36.4 (05-28-23 @ 09:27), Max: 37.1 (05-27-23 @ 15:57)  HR: 58 (05-28-23 @ 09:27) (58 - 71)  BP: 112/67 (05-28-23 @ 09:27) (112/67 - 178/76)  RR: 17 (05-28-23 @ 09:27) (17 - 18)  SpO2: 93% (05-28-23 @ 09:27) (92% - 95%)        I&O's Summary    27 May 2023 07:01  -  28 May 2023 07:00  --------------------------------------------------------  IN: 2150 mL / OUT: 600 mL / NET: 1550 mL        PHYSICAL EXAM:  Appearance: Comfortable. No acute distress  HEENT:  Atraumatic. Normocephalic.  Normal oral mucosa  Neurologic: A & O x 3, no gross focal deficits.  Cardiovascular: RRR S1 S2, No murmur, no rubs/gallops.   Respiratory: Lungs clear to auscultation, unlabored   Gastrointestinal:  Soft, Non-tender, + BS  Lower Extremities: No edema  Psychiatry: Patient is calm. No agitation.   Skin: warm and dry.          CURRENT CARDIAC MEDICATIONS:  amLODIPine Tablet 10 milliGRAM(s) Oral daily  carvedilol 12.5 milliGRAM(s) Oral every 12 hours          CURRENT OTHER MEDICATIONS:  levETIRAcetam  IVPB 1000 milliGRAM(s) IV Intermittent every 12 hours  diphenoxylate/atropine 2 Tablet(s) Oral two times a day PRN Diarrhea  chlorhexidine 2% Cloths 1 Application(s) Topical daily  potassium phosphate / sodium phosphate Powder (PHOS-NaK) 1 Packet(s) Oral two times a day, Stop order after: 3 Days  tamsulosin 0.4 milliGRAM(s) Oral at bedtime        LABS:	 	  ( 28 May 2023 04:48 )  Troponin T  X    ,  CPK  1147<H>, CKMB  X    , BNP X       ( 27 May 2023 04:55 )  Troponin T  X    ,  CPK  2234<H>, CKMB  X    , BNP X      ( 26 May 2023 05:00 )  Troponin T  X    ,  CPK  3070<H>, CKMB  X    , BNP X            05-28    137  |  109<H>  |  20.5<H>  ----------------------------<  82  3.7   |  17.0<L>  |  1.27    Ca    7.2<L>      28 May 2023 04:48  Phos  2.0     05-28  Mg     1.4     05-28      PT/INR/PTT ( 23 May 2023 04:40 )                       :                       :      12.9         :       35.5                  .        .                   .              .           .       1.11        .                                       Lipid Profile: Date: 05-27 @ 04:55  Total cholesterol 96; Direct LDL: --; HDL: 36; Triglycerides:91    TSH: Thyroid Stimulating Hormone, Serum: 0.93 uIU/m    TELEMETRY: SR, SB, PACs, junctional escape       DIAGNOSTIC TESTING:  [ ] Echocardiogram:   < from: TTE Echo Limited or F/U (05.23.23 @ 12:39) >  Summary:   1. Left ventricular ejection fraction, by visual estimation, is 60 to 65%.   2. Normal global left ventricular systolic function.   3. Spectral Doppler shows pseudonormal pattern of left ventricular myocardial filling (Grade II diastolic dysfunction).   4. Normal left ventricular internal cavity size.   5. Normal right ventricular size and function.   6. Moderately enlarged left atrium.   7. Normal right atrial size.   8. Mitral prosthesis with mild stenosis.   9. Mild to moderate mitral valve regurgitation.  10. Possible mobile echodensity on the posterior mitral valve. Cannot   rule out vegetation. Consider MUSTAPHA for further evaluation.  11. Bioprosthesis in the aortic position demonstrating normal function.  12. Mild tricuspid regurgitation.  13. Dilatation of the aortic root.  14. There is no evidence of pericardial effusion.  15. Estimated pulmonary artery systolic pressure is 42.8 mmHg assuming a   right atrial pressure of 8 mmHg, which is consistent with mild pulmonary   hypertension.    MD Jenna Electronically signed on 5/23/2023 at 5:02:14 PM    < end of copied text >    [ ]  Catheterization:  [ ] Stress Test:    OTHER:

## 2023-05-28 NOTE — DISCHARGE NOTE PROVIDER - ATTENDING DISCHARGE PHYSICAL EXAMINATION:
Vital Signs Last 24 Hrs  T(C): 36.4 (28 May 2023 09:27), Max: 37.1 (27 May 2023 15:57)  T(F): 97.6 (28 May 2023 09:27), Max: 98.8 (27 May 2023 20:30)  HR: 58 (28 May 2023 09:27) (58 - 71)  BP: 112/67 (28 May 2023 09:27) (112/67 - 178/76)  BP(mean): --  RR: 17 (28 May 2023 09:27) (17 - 18)  SpO2: 93% (28 May 2023 09:27) (92% - 95%)    Parameters below as of 28 May 2023 09:27  Patient On (Oxygen Delivery Method): room air    PHYSICAL EXAM:  Constitutional: No acute distress, alert and oriented by 3  HEENT: AT/NC, EOMI, PERRLA, Normal conjunctiva, no pharyngreal erythema, moist oral mucosa  Respiratory: CTA BL, equal breath sounds, no crackles or wheezing  Cardiovascular: RRR, no edema  Gastrointestinal: soft, Non-tender, Non-distended + Bowel sounds, no rebound or guarding  Genitourinary: no painting  Musculoskeletal: No joint edema  Neurological: CN 2-12 grossly intact, no focal deficits  Skin: warm, dry and intact  Psychiatric: normal mood and affect

## 2023-05-28 NOTE — DISCHARGE NOTE PROVIDER - HOSPITAL COURSE
62M with a history of atrial fibrillation, coronary artery disease with bypass surgery, cardiomyopathy, bioprosthetic aortic and mitral valve replacements, stroke, hypertension, aortic dissection repair with bowel ischemia requiring bowel resection, and renal failure which had resolved presented after being found unresponsive in the kitchen for an unknown period of time. Initiation CT of the head was notable for the prior stroke and an acute on chronic subdural hematoma. The patient was intubated and admitted to the Surgical Intensive Care Unit. Repeat CT of the head was without significant change and the patient was subsequently extubated. EEG monitoring noted occasional epileptiform discharges but no seizures. The patient was transferred to the Medical service for further management. He was found to have Rhabdomyolysis andAKI due to Fall / Syncope. He was treated with IV fluids and improved. He was also seen by cardiology. Antiplatelet agents held in the setting of intracranial hemorrhage.  Echocardiogram noted preserved left ventricular systolic function with grade II diastolic dysfunction and possible echodensity on the mitral valve. Blood cultures are without growth. Per cardiology echodensity Appears calcified and degenerative valve changes and or suture.  no clinical evidence of CVA.  no bacteremia. no suspicion of endocarditis.  NO further work up as of now. MUSTAPHA as outpatient for prosthetic MV regurgitation and prosthetic aortic stenosis assessment.

## 2023-05-28 NOTE — PROGRESS NOTE ADULT - REASON FOR ADMISSION
Found Down, Acute on Chronic SDH

## 2023-05-28 NOTE — PROGRESS NOTE ADULT - PROBLEM SELECTOR PLAN 1
.  - tele: SR, SB on monitor, junctional escape beats   - Magnesium 1.4 this am, repleted   - DOAC held for ICH, high risk falls/bleeding with seizure history   - watchman evaluation as out patient  - follow up with EP, sees Dr. Trevino as out patient  - out patient 4 week MCOT

## 2023-05-28 NOTE — PROGRESS NOTE ADULT - ASSESSMENT
61yo M w/ PMH aortic dissection s/p repair (2010) now stable, bowel ischemia s/p bowel resection and ostomy with reversal, bio AVR/MVR, HFpEF, afib on Eliquis who presented with AMS. Found intubated and sedated, no family at bedside. HPI per ED note was that patient. was last seen normal 48hours ago. Family reported that neighbor below his house heard a large thud. Family came to find patient altered and appeared to be sleeping. with facial droop and am weakness. Patient was diagnosed with  intracranial  hematoma.  We were asked to see patient for Possible mobile echodensity on the posterior mitral valve. Cannot r/o endocarditis.  Blood cultures have been negative  rule out vegetation. MUSTAPHA to be completed as out patient, severe MVR.

## 2023-05-28 NOTE — DISCHARGE NOTE PROVIDER - NSDCCPCAREPLAN_GEN_ALL_CORE_FT
PRINCIPAL DISCHARGE DIAGNOSIS  Diagnosis: Traumatic subdural hematoma with loss of consciousness  Assessment and Plan of Treatment: remain off blood thinner and anticoagulation  follow up with Neurosurgery in 1-2 weeks      SECONDARY DISCHARGE DIAGNOSES  Diagnosis: Acute on chronic diastolic congestive heart failure  Assessment and Plan of Treatment: resume torsemide every other day    Diagnosis: Chronic atrial fibrillation  Assessment and Plan of Treatment: follow up with Cardiology for watchman eval and for evnet monitor     PRINCIPAL DISCHARGE DIAGNOSIS  Diagnosis: Traumatic subdural hematoma with loss of consciousness  Assessment and Plan of Treatment: remain off blood thinner and anticoagulation  follow up with Neurosurgery Dr. Meek.  in 1-2 weeks  continue keppra 1000mg twice a day      SECONDARY DISCHARGE DIAGNOSES  Diagnosis: Acute on chronic diastolic congestive heart failure  Assessment and Plan of Treatment: resume torsemide every other day  follow up with cardiology in 1-2 weeks    Diagnosis: Chronic atrial fibrillation  Assessment and Plan of Treatment: follow up with Cardiology for watchman eval and for event monitor  follow up with EP Dr. JACK  do not take blood thinners or eluqis until cleared by neurosurgery    Diagnosis: Aortic stenosis  Assessment and Plan of Treatment: MUSTAPHA as outpaitent to assess the MR and Aortic valve stenosis.    Diagnosis: Hypertension  Assessment and Plan of Treatment: contnue Amlodipine and coreg    Diagnosis: Rhabdomyolysis  Assessment and Plan of Treatment: Hold atorvastatin and restart once you see your cardiologist

## 2023-05-28 NOTE — DISCHARGE NOTE PROVIDER - NSDCMRMEDTOKEN_GEN_ALL_CORE_FT
acetaminophen 325 mg oral tablet: 2 tab(s) orally every 6 hours, As needed, Mild Pain (1 - 3)  amLODIPine 5 mg oral tablet: 1 tab(s) orally once a day  Anusol-HC 2.5% rectal cream with applicator: 1 application rectally 4 times a day, As Needed   aspirin 81 mg oral tablet: 1 tab(s) orally once a day  atorvastatin 40 mg oral tablet: 1 tab(s) orally once a day (at bedtime)  Eliquis 5 mg oral tablet: 1  orally once a day  levETIRAcetam 750 mg oral tablet: 1 tab(s) orally once a day  metoprolol succinate 25 mg oral tablet, extended release: 1 tab(s) orally once a day  pantoprazole 40 mg oral delayed release tablet: 1 tab(s) orally once a day (before a meal)  sevelamer hydrochloride 800 mg oral tablet: 1 tab(s) orally 2 times a day (with meals)  tamsulosin 0.4 mg oral capsule: 1 cap(s) orally once a day (at bedtime)  torsemide 20 mg oral tablet: 1 tab(s) orally once a day  traMADol 50 mg oral tablet: 0.5 tab(s) orally every 4 hours, As needed, Moderate Pain (4 - 6)   amLODIPine 10 mg oral tablet: 1 tab(s) orally once a day  carvedilol 12.5 mg oral tablet: 1 tab(s) orally every 12 hours  levETIRAcetam 1000 mg oral tablet: 1 tab(s) orally 2 times a day  pantoprazole 40 mg oral delayed release tablet: 1 tab(s) orally once a day (before a meal)  tamsulosin 0.4 mg oral capsule: 1 cap(s) orally once a day (at bedtime)  torsemide 20 mg oral tablet: 1 tab(s) orally every other day

## 2023-06-08 ENCOUNTER — APPOINTMENT (OUTPATIENT)
Dept: CARDIOLOGY | Facility: CLINIC | Age: 63
End: 2023-06-08
Payer: MEDICARE

## 2023-06-08 VITALS
HEART RATE: 68 BPM | WEIGHT: 183 LBS | BODY MASS INDEX: 27.74 KG/M2 | SYSTOLIC BLOOD PRESSURE: 156 MMHG | OXYGEN SATURATION: 98 % | HEIGHT: 68 IN | DIASTOLIC BLOOD PRESSURE: 88 MMHG

## 2023-06-08 PROBLEM — I48.20 CHRONIC ATRIAL FIBRILLATION, UNSPECIFIED: Chronic | Status: ACTIVE | Noted: 2023-05-28

## 2023-06-08 PROCEDURE — 93000 ELECTROCARDIOGRAM COMPLETE: CPT

## 2023-06-08 PROCEDURE — 99215 OFFICE O/P EST HI 40 MIN: CPT

## 2023-06-08 RX ORDER — DAPAGLIFLOZIN 10 MG/1
10 TABLET, FILM COATED ORAL DAILY
Qty: 90 | Refills: 3 | Status: DISCONTINUED | COMMUNITY
Start: 2022-12-12 | End: 2023-06-08

## 2023-06-08 RX ORDER — LEVETIRACETAM 1000 MG/1
1000 TABLET, FILM COATED ORAL TWICE DAILY
Refills: 0 | Status: ACTIVE | COMMUNITY

## 2023-06-08 RX ORDER — ATORVASTATIN CALCIUM 40 MG/1
40 TABLET, FILM COATED ORAL
Qty: 1 | Refills: 3 | Status: ACTIVE | COMMUNITY
Start: 2023-06-08 | End: 1900-01-01

## 2023-06-08 RX ORDER — AMLODIPINE BESYLATE 10 MG/1
10 TABLET ORAL DAILY
Qty: 90 | Refills: 1 | Status: DISCONTINUED | COMMUNITY
End: 2023-06-08

## 2023-06-08 RX ORDER — CARVEDILOL 25 MG/1
25 TABLET, FILM COATED ORAL TWICE DAILY
Qty: 180 | Refills: 3 | Status: DISCONTINUED | COMMUNITY
Start: 2022-05-31 | End: 2023-06-08

## 2023-06-09 ENCOUNTER — APPOINTMENT (OUTPATIENT)
Dept: NEPHROLOGY | Facility: CLINIC | Age: 63
End: 2023-06-09
Payer: MEDICARE

## 2023-06-09 VITALS
BODY MASS INDEX: 28.04 KG/M2 | DIASTOLIC BLOOD PRESSURE: 72 MMHG | WEIGHT: 185 LBS | SYSTOLIC BLOOD PRESSURE: 140 MMHG | HEIGHT: 68 IN | OXYGEN SATURATION: 97 % | HEART RATE: 71 BPM

## 2023-06-09 DIAGNOSIS — Z86.79 OTHER SPECIFIED POSTPROCEDURAL STATES: ICD-10-CM

## 2023-06-09 DIAGNOSIS — Z98.890 OTHER SPECIFIED POSTPROCEDURAL STATES: ICD-10-CM

## 2023-06-09 PROCEDURE — 36415 COLL VENOUS BLD VENIPUNCTURE: CPT

## 2023-06-09 PROCEDURE — 99214 OFFICE O/P EST MOD 30 MIN: CPT | Mod: 25

## 2023-06-09 RX ORDER — ATORVASTATIN CALCIUM 40 MG/1
40 TABLET, FILM COATED ORAL
Qty: 90 | Refills: 3 | Status: DISCONTINUED | COMMUNITY
End: 2023-06-09

## 2023-06-09 RX ORDER — AMLODIPINE BESYLATE 10 MG/1
10 TABLET ORAL DAILY
Refills: 0 | Status: ACTIVE | COMMUNITY

## 2023-06-09 NOTE — PHYSICAL EXAM
[General Appearance - Alert] : alert [General Appearance - In No Acute Distress] : in no acute distress [Sclera] : the sclera and conjunctiva were normal [PERRL With Normal Accommodation] : pupils were equal in size, round, and reactive to light [Extraocular Movements] : extraocular movements were intact [Outer Ear] : the ears and nose were normal in appearance [Oropharynx] : the oropharynx was normal [Neck Appearance] : the appearance of the neck was normal [Neck Cervical Mass (___cm)] : no neck mass was observed [Jugular Venous Distention Increased] : there was no jugular-venous distention [Thyroid Diffuse Enlargement] : the thyroid was not enlarged [Thyroid Nodule] : there were no palpable thyroid nodules [Decreased Breath Sounds] : decreased breath sounds [Heart Rate And Rhythm] : heart rate was normal and rhythm regular [Heart Sounds] : normal S1 and S2 [Heart Sounds Gallop] : no gallops [Heart Sounds Pericardial Friction Rub] : no pericardial rub [Systolic grade ___/6] : A grade [unfilled]/6 systolic murmur was heard. [Full Pulse] : the pedal pulses are present [Edema] : there was no peripheral edema [Bowel Sounds] : normal bowel sounds [Abdomen Soft] : soft [Abdomen Tenderness] : non-tender [Abdomen Mass (___ Cm)] : no abdominal mass palpated [Cervical Lymph Nodes Enlarged Posterior Bilaterally] : posterior cervical [Cervical Lymph Nodes Enlarged Anterior Bilaterally] : anterior cervical [Supraclavicular Lymph Nodes Enlarged Bilaterally] : supraclavicular [Axillary Lymph Nodes Enlarged Bilaterally] : axillary [Femoral Lymph Nodes Enlarged Bilaterally] : femoral [Inguinal Lymph Nodes Enlarged Bilaterally] : inguinal [No CVA Tenderness] : no ~M costovertebral angle tenderness [No Spinal Tenderness] : no spinal tenderness [Abnormal Walk] : normal gait [Nail Clubbing] : no clubbing  or cyanosis of the fingernails [Musculoskeletal - Swelling] : no joint swelling seen [Motor Tone] : muscle strength and tone were normal [Skin Color & Pigmentation] : normal skin color and pigmentation [Skin Turgor] : normal skin turgor [] : no rash [Deep Tendon Reflexes (DTR)] : deep tendon reflexes were 2+ and symmetric [Sensation] : the sensory exam was normal to light touch and pinprick [No Focal Deficits] : no focal deficits [Oriented To Time, Place, And Person] : oriented to person, place, and time [Impaired Insight] : insight and judgment were intact [Affect] : the affect was normal [FreeTextEntry1] : Surgical Scar,

## 2023-06-09 NOTE — ASSESSMENT
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 States off dialysis now. Has no  lower extremity edema. Making urine. \par \par Will resume torsemide 20 mg every other day, PRN \par \par Hold ARB, \par \par Benign essential hypertension (401.1) (I10)\par Cardiomyopathy (425.4) (I42.9)\par CKD (chronic kidney disease) (585.9) (N18.9)\par Carotid artery disease (447.9) (I77.9)\par HLD (hyperlipidemia) (272.4) (E78.5)\par History of aortic aneurysm repair (V45.89) (Z98.890,Z86.79)\par Atrial fibrillation (427.31) (I48.91)\par \par Plan\par Benign essential hypertension \par Renew: amLODIPine Besylate 10 MG Oral Tablet; TAKE 1 TABLET DAILY\par \par Plan\par 1. HTN- BP mildly elevated, ? compliance. Reviewed med list. Continue current meds.\par 2. Afib- s/p ablation 9/2021, on Eliquis and BB, no AF on event monitor, f/u with EP \par 3. CAD- s/p CABG, no CP/SOB, on ASA , statin, and BB\par 4. AS/MR- s/p AVR and MVR, mild MR on recent TTE, on ASA \par 5. CM- appears euvolemic, LVEF 45-50% on TTE 9/2022. On torsemide 20 mg po qod. \par 6. HLD- LDL 46, on lipitor 40 mg daily\par 7. hx aortic dissection repair- chronic dissection, will follow up with CTS- Dr. Butler\par 8. renal artery stenosis/carotid artery disease- continue to follow with vascular. \par 9. CKD- no longer on dialysis, recent Cr 1.6 mg., \par \par Event recorder for 2 weeks. , Meds Reviewed, \par \par

## 2023-06-11 ENCOUNTER — NON-APPOINTMENT (OUTPATIENT)
Age: 63
End: 2023-06-11

## 2023-06-11 NOTE — HISTORY OF PRESENT ILLNESS
[FreeTextEntry1] : Patient with history of Type A aortic dissection then repeat sternotomy in 11/2020 with CABG x2, systolic CHF, Mitral valve and aortic valve replacement, ESRD. \par Patient has had history of medication non compliance and now taking all medications. \par Right hand with limited per patient since surgery. Normal motion/normal nerve function. \par \par 7/2021- Recently in hospital for CHF. afib with RVR. MUSTAPHA/CV performed. ? of whether patient is a candidate for renal transplant. \par \par 5/31/2022\par Patient here for follow up of HTN. BP elevated today. He reports compliance with his medications. He states he was taken off of losartan by nephrology a couple of months ago. He denies headaches, vision changes, chest pain, dyspnea, near syncope or syncope. No leg edema or orthopnea/PND. He is no longer on HD- stopped in October 2021. Cr 1.6 in 4/2022. \par \par 6/2023- Recent admission to [facility] due to subdural and subarachnoid bleeding. Found down at home. Unclear etiology. No trauma noted but bleeding.  Loud murmur on examination and noted to have an abnormal TTE. Possible echogenic density on the posterior leaflet of the mitral valve. MUSTAPHA not performed due to acute nature of patient's bleed. Eliquis stopped. \par Patient with significant confusion regarding his medications. \par \par

## 2023-06-11 NOTE — DISCUSSION/SUMMARY
[FreeTextEntry1] : 1. HTN- Elevated. No changes in meds considering significant confusion. Will continue to uptitrate. \par 2. Afib- s/p ablation 9/2021. Now recurrent afib. \par 3. CAD- s/p CABG, no CP/SOB. \par 4. AS/MR- s/p AVR and MVR, mild MR on recent TTE. Will need MUSTAPHA in a few weeks. \par 5. CM- appears euvolemic, LVEF 55-60% on TTE 9/2022. On torsemide 20 mg po qod. \par 6. Subdural and Subarachnoid bleeding- Holding Eliquis and aspirin. Unclear etiology. Fall vs spontaneous bleeding. Will have to be evaluated for LAAO in the future. 2 week monitor to assess for ventricular arrhythmias. \par 7. Follow up in 2-3 weeks. \par \par  [EKG obtained to assist in diagnosis and management of assessed problem(s)] : EKG obtained to assist in diagnosis and management of assessed problem(s)

## 2023-06-12 LAB
ALBUMIN SERPL ELPH-MCNC: 3.8 G/DL
ANION GAP SERPL CALC-SCNC: 14 MMOL/L
APPEARANCE: CLEAR
BACTERIA: NEGATIVE /HPF
BILIRUBIN URINE: NEGATIVE
BLOOD URINE: ABNORMAL
BUN SERPL-MCNC: 28 MG/DL
CALCIUM SERPL-MCNC: 8.9 MG/DL
CALCIUM SERPL-MCNC: 8.9 MG/DL
CAST: 0 /LPF
CHLORIDE SERPL-SCNC: 103 MMOL/L
CHOLEST SERPL-MCNC: 159 MG/DL
CO2 SERPL-SCNC: 22 MMOL/L
COLOR: YELLOW
CREAT SERPL-MCNC: 1.8 MG/DL
CREAT SPEC-SCNC: 37 MG/DL
CREAT SPEC-SCNC: 37 MG/DL
CREAT/PROT UR: 3 RATIO
EGFR: 42 ML/MIN/1.73M2
EPITHELIAL CELLS: 0 /HPF
GLUCOSE QUALITATIVE U: NEGATIVE MG/DL
GLUCOSE SERPL-MCNC: 76 MG/DL
HDLC SERPL-MCNC: 45 MG/DL
KETONES URINE: NEGATIVE MG/DL
LDLC SERPL CALC-MCNC: 99 MG/DL
LEUKOCYTE ESTERASE URINE: NEGATIVE
MICROALBUMIN 24H UR DL<=1MG/L-MCNC: 62.2 MG/DL
MICROALBUMIN/CREAT 24H UR-RTO: 1697 MG/G
MICROSCOPIC-UA: NORMAL
NITRITE URINE: NEGATIVE
NONHDLC SERPL-MCNC: 114 MG/DL
PARATHYROID HORMONE INTACT: 30 PG/ML
PH URINE: 6
PHOSPHATE SERPL-MCNC: 3.6 MG/DL
POTASSIUM SERPL-SCNC: 3.8 MMOL/L
PROT UR-MCNC: 111 MG/DL
PROTEIN URINE: 100 MG/DL
RED BLOOD CELLS URINE: 0 /HPF
SODIUM SERPL-SCNC: 140 MMOL/L
SPECIFIC GRAVITY URINE: 1.01
TRIGL SERPL-MCNC: 75 MG/DL
URATE SERPL-MCNC: 8.3 MG/DL
UROBILINOGEN URINE: 0.2 MG/DL
WHITE BLOOD CELLS URINE: 0 /HPF

## 2023-06-14 ENCOUNTER — APPOINTMENT (OUTPATIENT)
Dept: NEUROLOGY | Facility: CLINIC | Age: 63
End: 2023-06-14

## 2023-06-14 NOTE — CONSULT NOTE ADULT - CONSULT REQUESTED DATE/TIME
Quality 137: Melanoma: Continuity Of Care - Recall System: Patient information entered into a recall system that includes: target date for the next exam specified AND a process to follow up with patients regarding missed or unscheduled appointments
01-Dec-2020 09:08
01-Dec-2020 13:00
08-Dec-2020 15:00
12-Dec-2020 14:46
Detail Level: Detailed
Show Follow-Up Variable: Yes

## 2023-06-26 ENCOUNTER — NON-APPOINTMENT (OUTPATIENT)
Age: 63
End: 2023-06-26

## 2023-06-27 ENCOUNTER — APPOINTMENT (OUTPATIENT)
Dept: NEUROSURGERY | Facility: CLINIC | Age: 63
End: 2023-06-27

## 2023-06-30 ENCOUNTER — APPOINTMENT (OUTPATIENT)
Dept: NEPHROLOGY | Facility: CLINIC | Age: 63
End: 2023-06-30

## 2023-07-11 ENCOUNTER — APPOINTMENT (OUTPATIENT)
Dept: NEUROSURGERY | Facility: CLINIC | Age: 63
End: 2023-07-11
Payer: MEDICARE

## 2023-07-11 VITALS
HEIGHT: 68 IN | DIASTOLIC BLOOD PRESSURE: 68 MMHG | SYSTOLIC BLOOD PRESSURE: 195 MMHG | WEIGHT: 185 LBS | OXYGEN SATURATION: 97 % | BODY MASS INDEX: 28.04 KG/M2 | TEMPERATURE: 98.7 F | HEART RATE: 72 BPM

## 2023-07-11 PROCEDURE — 99213 OFFICE O/P EST LOW 20 MIN: CPT

## 2023-07-11 NOTE — PHYSICAL EXAM
[General Appearance - Alert] : alert [General Appearance - In No Acute Distress] : in no acute distress [Oriented To Time, Place, And Person] : oriented to person, place, and time [Impaired Insight] : insight and judgment were intact [Person] : oriented to person [Place] : oriented to place [Time] : oriented to time [Short Term Intact] : short term memory intact [Remote Intact] : remote memory intact [Span Intact] : the attention span was normal [Concentration Intact] : normal concentrating ability [Fluency] : fluency intact [Comprehension] : comprehension intact [Current Events] : adequate knowledge of current events [Past History] : adequate knowledge of personal past history [Vocabulary] : adequate range of vocabulary [Cranial Nerves Oculomotor (III)] : extraocular motion intact [Cranial Nerves Trigeminal (V)] : facial sensation intact symmetrically [Cranial Nerves Facial (VII)] : face symmetrical [Cranial Nerves Vestibulocochlear (VIII)] : hearing was intact bilaterally [Cranial Nerves Glossopharyngeal (IX)] : tongue and palate midline [Cranial Nerves Accessory (XI - Cranial And Spinal)] : head turning and shoulder shrug symmetric [Cranial Nerves Hypoglossal (XII)] : there was no tongue deviation with protrusion [Motor Tone] : muscle tone was normal in all four extremities [Motor Strength] : muscle strength was normal in all four extremities [No Muscle Atrophy] : normal bulk in all four extremities [Motor Handedness Right-Handed] : the patient is right hand dominant [Sensation Tactile Decrease] : light touch was intact [Balance] : balance was intact [Extraocular Movements] : extraocular movements were intact [Outer Ear] : the ears and nose were normal in appearance [Neck Appearance] : the appearance of the neck was normal [] : no respiratory distress [Respiration, Rhythm And Depth] : normal respiratory rhythm and effort [Exaggerated Use Of Accessory Muscles For Inspiration] : no accessory muscle use [Heart Rate And Rhythm] : heart rate was normal and rhythm regular [Abnormal Walk] : normal gait [Involuntary Movements] : no involuntary movements were seen [Skin Color & Pigmentation] : normal skin color and pigmentation [Skin Turgor] : normal skin turgor [Past-pointing] : there was no past-pointing [Tremor] : no tremor present

## 2023-07-11 NOTE — REASON FOR VISIT
[New Patient Visit] : a new patient visit [Referred By: _________] : Patient was referred by ELIZABETH

## 2023-07-11 NOTE — HISTORY OF PRESENT ILLNESS
[FreeTextEntry1] : SDH [de-identified] : 63 year old male with significan PMH presents for hospital follow up for SDH. \par May 22 2023 was found down in his kitchen and taken to [facility] ED. \par PMH of atrial fibrillation, coronary artery disease with bypass surgery, cardiomyopathy, bioprosthetic aortic and mitral valve replacements, stroke, hypertension, aortic dissection repair with bowel ischemia requiring bowel resection, and renal failure.\par Initial CT of the head was notable for the prior stroke and an acute on chronic subdural hematoma. The patient was intubated and admitted to the Surgical Intensive Care \par Unit. Repeat CT of the head was without significant change and the patient was subsequently extubated. EEG monitoring noted occasional epileptiform discharges but no seizures. The patient was transferred to the Medical service for further management. He was found to have Rhabdomyolysis andAKI due to Fall / Syncope. He was treated with IV fluids and improved. He was also seen by cardiology. Antiplatelet agents held in the setting of intracranial hemorrhage. \par Echocardiogram noted preserved left ventricular systolic function with grade II diastolic dysfunction and possible echodensity on the mitral valve. Blood cultures are without growth. Per cardiology echodensity Appears calcified and degenerative valve changes and or suture.  no clinical evidence of CVA.  no bacteremia. no suspicion of endocarditis. \par \par Discharged to home 5/28/2023.\par \par Today, he reports feeling well with c/o headaches or any neurological symptomatology. He remains off blood thinners. Last CT in hospital stable. Will repeat. \par \par Plan Non contrast brain ct - HOLD ELIQUIS and ASA until we clear after scan

## 2023-07-13 ENCOUNTER — APPOINTMENT (OUTPATIENT)
Dept: CARDIOLOGY | Facility: CLINIC | Age: 63
End: 2023-07-13
Payer: MEDICARE

## 2023-07-13 VITALS
SYSTOLIC BLOOD PRESSURE: 159 MMHG | HEART RATE: 67 BPM | TEMPERATURE: 97.7 F | OXYGEN SATURATION: 96 % | DIASTOLIC BLOOD PRESSURE: 69 MMHG | BODY MASS INDEX: 28.19 KG/M2 | WEIGHT: 186 LBS | HEIGHT: 68 IN

## 2023-07-13 PROCEDURE — 99214 OFFICE O/P EST MOD 30 MIN: CPT

## 2023-07-13 RX ORDER — AMLODIPINE BESYLATE 10 MG/1
10 TABLET ORAL DAILY
Refills: 0 | Status: DISCONTINUED | COMMUNITY
End: 2023-07-13

## 2023-07-13 RX ORDER — CARVEDILOL 6.25 MG/1
6.25 TABLET, FILM COATED ORAL
Qty: 180 | Refills: 1 | Status: ACTIVE | COMMUNITY
Start: 1900-01-01 | End: 1900-01-01

## 2023-07-18 ENCOUNTER — OUTPATIENT (OUTPATIENT)
Dept: OUTPATIENT SERVICES | Facility: HOSPITAL | Age: 63
LOS: 1 days | End: 2023-07-18
Payer: MEDICARE

## 2023-07-18 ENCOUNTER — APPOINTMENT (OUTPATIENT)
Dept: CT IMAGING | Facility: CLINIC | Age: 63
End: 2023-07-18
Payer: MEDICARE

## 2023-07-18 DIAGNOSIS — Z95.2 PRESENCE OF PROSTHETIC HEART VALVE: Chronic | ICD-10-CM

## 2023-07-18 DIAGNOSIS — I77.0 ARTERIOVENOUS FISTULA, ACQUIRED: Chronic | ICD-10-CM

## 2023-07-18 DIAGNOSIS — S06.5XAA TRAUMATIC SUBDURAL HEMORRHAGE WITH LOSS OF CONSCIOUSNESS STATUS UNKNOWN, INITIAL ENCOUNTER: ICD-10-CM

## 2023-07-18 DIAGNOSIS — Z86.79 PERSONAL HISTORY OF OTHER DISEASES OF THE CIRCULATORY SYSTEM: Chronic | ICD-10-CM

## 2023-07-18 DIAGNOSIS — Z90.49 ACQUIRED ABSENCE OF OTHER SPECIFIED PARTS OF DIGESTIVE TRACT: Chronic | ICD-10-CM

## 2023-07-18 DIAGNOSIS — Z95.1 PRESENCE OF AORTOCORONARY BYPASS GRAFT: Chronic | ICD-10-CM

## 2023-07-18 PROCEDURE — 70450 CT HEAD/BRAIN W/O DYE: CPT | Mod: 26,MH

## 2023-07-18 PROCEDURE — 70450 CT HEAD/BRAIN W/O DYE: CPT

## 2023-07-20 ENCOUNTER — NON-APPOINTMENT (OUTPATIENT)
Age: 63
End: 2023-07-20

## 2023-07-20 NOTE — ASU PATIENT PROFILE, ADULT - NSTOBACCO TYPE_GEN_A_CORE_RD
Cigarettes [Large Joint Injection] : Large joint injection [Bilateral] : bilaterally of the [Knee] : knee [Pain] : pain [Inflammation] : inflammation [X-ray evidence of Osteoarthritis on this or prior visit] : x-ray evidence of Osteoarthritis on this or prior visit [Alcohol] : alcohol [Betadine] : betadine [Ethyl Chloride sprayed topically] : ethyl chloride sprayed topically [Sterile technique used] : sterile technique used [___ cc    6mg] :  Betamethasone (Celestone) ~Vcc of 6mg [___ cc    2%] : Lidocaine ~Vcc of 2%  [] : Patient tolerated procedure well [Call if redness, pain or fever occur] : call if redness, pain or fever occur [Apply ice for 15min out of every hour for the next 12-24 hours as tolerated] : apply ice for 15 minutes out of every hour for the next 12-24 hours as tolerated [Patient was advised to rest the joint(s) for ____ days] : patient was advised to rest the joint(s) for [unfilled] days [Previous OTC use and PT nontherapeutic] : patient has tried OTC's including aspirin, Ibuprofen, Aleve, etc or prescription NSAIDS, and/or exercises at home and/or physical therapy without satisfactory response [Patient had decreased mobility in the joint] : patient had decreased mobility in the joint [Risks, benefits, alternatives discussed / Verbal consent obtained] : the risks benefits, and alternatives have been discussed, and verbal consent was obtained

## 2023-08-03 ENCOUNTER — APPOINTMENT (OUTPATIENT)
Dept: CARDIOLOGY | Facility: CLINIC | Age: 63
End: 2023-08-03
Payer: MEDICARE

## 2023-08-03 VITALS
DIASTOLIC BLOOD PRESSURE: 72 MMHG | WEIGHT: 187 LBS | SYSTOLIC BLOOD PRESSURE: 156 MMHG | BODY MASS INDEX: 28.34 KG/M2 | HEART RATE: 63 BPM | OXYGEN SATURATION: 97 % | HEIGHT: 68 IN

## 2023-08-03 PROCEDURE — 99215 OFFICE O/P EST HI 40 MIN: CPT

## 2023-08-03 NOTE — DISCUSSION/SUMMARY
[FreeTextEntry1] : A/P Discussed with and seen by Dr. Frankel.   1. HTN: mildly elevated , continue Isosorbide MN 30 mg daily, and follow.  Coreg dose was decreased at last visit  to 6.25 mg BID due to bradycardia with pauses on HM 2. PAfib- s/p ablation 9/2021. recent HM w elis. junctional and pauses, on lower coreg dose, reports asymptomatic. AC was on hold due to subdural and subarachnoid bleed. repeat CT done  7/18/23 with resolution of subdural hematoma, Now  OK to resume Eliquis as per neuro. restart Eliquis 5 mg BID. Advised EP f/u with Dr Trevino- consider Watchman procedure. 3. CAD- s/p CABG, denies CP/SOB, not re-started on ASA at this time 4. AS/MR- s/p AVR and MVR, mild MR, recent TTE with possible echodensity on the posterior leaflet of the MV, MUSTAPHA was not performed due to the acute bleed.  will order MUSTAPHA- pt would like to proceed 5. CM- appears euvolemic, LVEF 55-60% on TTE 9/2022. On torsemide 20 mg po qod. CKD followed by Nephrology  6. Subdural and Subarachnoid bleeding: Unclear etiology. Fall vs spontaneous bleeding. followed by neuro and neurosx , ok to resume AC with Eliquis as per neuro   Follow-up with Dr. Frankel in 6-8 weeks    Joanne Sherwood PA-C

## 2023-08-03 NOTE — HISTORY OF PRESENT ILLNESS
[FreeTextEntry1] : Patient with history of Type A aortic dissection then repeat sternotomy in 11/2020 with CABG x2, systolic CHF, Mitral valve and aortic valve replacement, ESRD.  Patient has had history of medication non compliance and now taking all medications.  Right hand with limited per patient since surgery. Normal motion/normal nerve function.   7/2021- Recently in hospital for CHF. afib with RVR. MUSTAPHA/CV performed. ? of whether patient is a candidate for renal transplant.   5/31/2022 Patient here for follow up of HTN. BP elevated today. He reports compliance with his medications. He states he was taken off of losartan by nephrology a couple of months ago. He denies headaches, vision changes, chest pain, dyspnea, near syncope or syncope. No leg edema or orthopnea/PND. He is no longer on HD- stopped in October 2021. Cr 1.6 in 4/2022.   6/2023- Recent admission to [facility] due to subdural and subarachnoid bleeding. Found down at home. Unclear etiology. No trauma noted but bleeding.  Loud murmur on examination and noted to have an abnormal TTE. Possible echogenic density on the posterior leaflet of the mitral valve. MUSTAPHA not performed due to acute nature of patient's bleed. Eliquis stopped.  Patient with significant confusion regarding his medications.   7/13/2023 Patient here for follow up. BP elevated, he did not bring his meds today. He was seen by neurologist last week- Dr. Driscoll and by neurosurgery- Dr. Meek 2 days ago. Scheduled for CT head on 7/18. He is to continue to hold Eliquis and aspirin. He c/o increased palpitations, worse at night and at rest. Accompanied by lightheadedness/dizziness. No chest pain or SOB. Reviewed event monitor results.   8/3/2023 Presents for f/u of BP and to discuss restarting AC , pursuing MUSTAPHA and consideration of Watchman procedure Neurosx  advised he can resume AC Since last visit he reports  feeling well without any new symptoms to report  reports chronic palpitations which he feels at night while laying down and occ lightheadedness which is also chronic Denies chest pain, SOB/ZUNIGA, PND, orthopnea, LE edema, lightheadedness, syncope

## 2023-08-03 NOTE — PHYSICAL EXAM
[Well Developed] : well developed [Normal Gait] : normal gait [No Edema] : no edema [Normal] : alert and oriented, normal memory [Well Nourished] : well nourished [No Acute Distress] : no acute distress [Normal S1, S2] : normal S1, S2 [Murmur] : murmur [Moves all extremities] : moves all extremities [Normal Speech] : normal speech [No Cyanosis] : no cyanosis [de-identified] : III/VI LUSB.

## 2023-08-03 NOTE — REVIEW OF SYSTEMS
[Palpitations] : palpitations [Negative] : Neurological [SOB] : no shortness of breath [Dyspnea on exertion] : not dyspnea during exertion [Chest Discomfort] : no chest discomfort [Lower Ext Edema] : no extremity edema [Leg Claudication] : no intermittent leg claudication [Orthopnea] : no orthopnea [PND] : no PND [Syncope] : no syncope [Coughing Up Blood] : no hemoptysis [Blood in stool] : no blood in stoo [Hematuria] : no hematuria [Easy Bleeding] : no tendency for easy bleeding

## 2023-08-11 RX ORDER — CHLORHEXIDINE GLUCONATE 213 G/1000ML
1 SOLUTION TOPICAL ONCE
Refills: 0 | Status: DISCONTINUED | OUTPATIENT
Start: 2023-08-14 | End: 2023-08-28

## 2023-08-11 NOTE — H&P PST ADULT - HISTORY OF PRESENT ILLNESS
Department of Cardiology                                        [facility]/[place] [address] [facility]                                    Telephone: [phone]. Fax:[phone]                                                CARIOLOGY PRE MUSTAPHA NOTE     63y Male here for a MUSTAPHA.     Indication:   evaluation for Watchman Procedure     HPI:    64 yo male with PMHX of HTN, Type A dissection then repeat sternotomy in 11/2020 with CABG x2, systolic CHF, mitral valve and aortic valve replacement, ESRD, non-compliant with medications. Had a hospitalization 7/2021 for CHF, afib with RVR s/p MUSTAPHA / CV performed. He now reports compliance with his BP medications. e follows closely with nephrology to manage his need for dialysis. He was taken off losartan in 2022. He creatinine improved, no longer on HD Cr 1.66 4/2022. He had a recent admission to d/t       Echo:     Physical Exam:   General: Awake, alert, no acute distress  HEENT: NC, AT, airway patent.  Chest: CTA, RRR                Assessment and Plan:   The patient was examined and interviewed by me today. There has been no change from the original history and physical except as noted above.    Problem List:   1.       2.                                                    Department of Cardiology                                        [facility]/[place] [address] [facility]                                    Telephone: [phone]. Fax:[phone]                                                CARIOLOGY PRE MUSTAPHA NOTE     63y Male here for a MUSTAPHA.     Indication:   evaluation for Watchman Procedure     HPI:    62 yo male with PMHX of HTN, Type A dissection then repeat sternotomy in 11/2020 with CABG x2, systolic CHF, mitral valve and aortic valve replacement, ESRD, non-compliant with medications. Had a hospitalization 7/2021 for CHF, afib with RVR s/p MUSTAPHA / CV performed. He now reports compliance with his BP medications. e follows closely with nephrology to manage his need for dialysis. He was taken off losartan in 2022. He creatinine improved, no longer on HD Cr 1.66 4/2022. He had a recent admission to d/t subdural and subarachnoid bleeding. He had an abnormal echo, showing possible echogenic density on the posterior leaflet of the mitral valve. Echo not performed due to acute nature of patient's bleed, Eliquis was stopped. Per his neurologist, Eliquis  and aspirin were held post SAH and have since been resumed 8/2023. He reports chronic palpitations and lightheadedness at night while laying down. Patient presents today for MUSTAPHA in anticipation of Watchman procedure.        Echo:     < from: TTE Echo Limited or F/U (05.23.23 @ 12:39) >  Summary:   1. Left ventricular ejection fraction, by visual estimation, is 60 to   65%.   2. Normal global left ventricular systolic function.   3. Spectral Doppler shows pseudonormal pattern of left ventricular   myocardial filling (Grade II diastolic dysfunction).   4. Normal left ventricular internal cavity size.   5. Normal right ventricular size and function.   6. Moderately enlarged left atrium.   7. Normal right atrial size.   8. Mitral prosthesis with mild stenosis.   9. Mild to moderate mitral valve regurgitation.  10. Possible mobile echodensity on the posterior mitral valve. Cannot   rule out vegetation. Consider MUSTAPHA for further evaluation.  11. Bioprosthesis in the aortic position demonstrating normal function.  12. Mild tricuspid regurgitation.  13. Dilatation of the aortic root.  14. There is no evidence of pericardial effusion.  15. Estimated pulmonary artery systolic pressure is 42.8 mmHg assuming a   right atrial pressure of 8 mmHg, which is consistent with mild pulmonary   hypertension.    MD Jenna Electronically signed on 5/23/2023 at 5:02:14 PM    *** Final ***    < end of copied text >      Physical Exam:   General: Awake, alert, no acute distress  HEENT: NC, AT, airway patent.  Chest: CTA, RRR                Assessment and Plan:   The patient was examined and interviewed by me today. There has been no change from the original history and physical except as noted above.    Problem List:   1.       2.                                                    Department of Cardiology                                        [facility]/[place] [address] [facility]                                    Telephone: [phone]. Fax:[phone]                                                CARIOLOGY PRE MUSTAPHA NOTE     63y Male here for a MUSTAPHA.     Indication:   evaluation for Watchman Procedure/echodensity MV    HPI:    64 yo male with PMHX of HTN, Type A dissection then repeat sternotomy in 11/2020 with CABG x2, systolic CHF, mitral valve and aortic valve replacement, ESRD, non-compliant with medications. Had a hospitalization 7/2021 for CHF, afib with RVR s/p MUSTAPHA / CV performed. He now reports compliance with his BP medications. e follows closely with nephrology to manage his need for dialysis. He was taken off losartan in 2022. He creatinine improved, no longer on HD Cr 1.66 4/2022. He had a recent admission to d/t subdural and subarachnoid bleeding. He had an abnormal echo, showing possible echogenic density on the posterior leaflet of the mitral valve. Echo not performed due to acute nature of patient's bleed, Eliquis was stopped. Per his neurologist, Eliquis  and aspirin were held post SAH and have since been resumed 8/2023. He reports chronic palpitations and lightheadedness at night while laying down. Patient presents today for MUSTAPHA in anticipation of Watchman procedure and to assess echodensity on mitral valve      Echo:     < from: TTE Echo Limited or F/U (05.23.23 @ 12:39) >  Summary:   1. Left ventricular ejection fraction, by visual estimation, is 60 to   65%.   2. Normal global left ventricular systolic function.   3. Spectral Doppler shows pseudonormal pattern of left ventricular   myocardial filling (Grade II diastolic dysfunction).   4. Normal left ventricular internal cavity size.   5. Normal right ventricular size and function.   6. Moderately enlarged left atrium.   7. Normal right atrial size.   8. Mitral prosthesis with mild stenosis.   9. Mild to moderate mitral valve regurgitation.  10. Possible mobile echodensity on the posterior mitral valve. Cannot   rule out vegetation. Consider MUSTAPHA for further evaluation.  11. Bioprosthesis in the aortic position demonstrating normal function.  12. Mild tricuspid regurgitation.  13. Dilatation of the aortic root.  14. There is no evidence of pericardial effusion.  15. Estimated pulmonary artery systolic pressure is 42.8 mmHg assuming a   right atrial pressure of 8 mmHg, which is consistent with mild pulmonary   hypertension.    MD Jenna Electronically signed on 5/23/2023 at 5:02:14 PM        Physical Exam:   General: Awake, alert, no acute distress  HEENT: NC, AT, airway patent.  Chest: CTA, RRR

## 2023-08-11 NOTE — H&P PST ADULT - ASSESSMENT
64 yo male with PMHX of HTN, Type A dissection then repeat sternotomy in 11/2020 with CABG x2, systolic CHF, mitral valve and aortic valve replacement, ESRD, non-compliant with medications. Had a hospitalization 7/2021 for CHF, afib with RVR s/p MUSTAPHA / CV performed, and admission for SAH, AC restarted. Patient presents today for MUSTAPHA in anticipation of Watchman procedure.        -pt has been NPO since MN  -labs, ekg ordered  -consent to be obtained by attending and anesthesia  -MUSTAPHA order placed  64 yo male with PMHX of HTN, Type A dissection then repeat sternotomy in 11/2020 with CABG x2, systolic CHF, mitral valve and aortic valve replacement, ESRD, non-compliant with medications. Had a hospitalization 7/2021 for CHF, afib with RVR s/p MUSTAPHA / CV performed, and admission for SAH, AC restarted. Patient presents today for MUSTAPHA in anticipation of Watchman procedure and to assess echodensity on mitral valve      -pt has been NPO since 6pm  -labs, ekg ordered  -consent to be obtained by attending and anesthesia  -MUSTAPHA order placed

## 2023-08-11 NOTE — H&P PST ADULT - NSICDXPASTMEDICALHX_GEN_ALL_CORE_FT
PAST MEDICAL HISTORY:  Atrial fibrillation     CAD (coronary artery disease) s/p PCI 1998  and CABG x 2 11/2020    CHF (congestive heart failure)     Chronic atrial fibrillation     COVID-19 october 2020    CVA (cerebral vascular accident)     ESRD on dialysis Tu/Thurs/Sat    Former smoker     GIB (gastrointestinal bleeding)     H/O aortic dissection s/p repair (2010), surgery complicated by bowel ischemia s/p bowel resection and ostomy with reversal    H/O aortic valve insufficiency     History of cocaine use remote hx, currently sober    HTN (hypertension)     Hyperlipemia     Mitral regurgitation     Seizures last seizure 10 years ago

## 2023-08-14 ENCOUNTER — TRANSCRIPTION ENCOUNTER (OUTPATIENT)
Age: 63
End: 2023-08-14

## 2023-08-14 ENCOUNTER — OUTPATIENT (OUTPATIENT)
Dept: OUTPATIENT SERVICES | Facility: HOSPITAL | Age: 63
LOS: 1 days | End: 2023-08-14
Payer: MEDICARE

## 2023-08-14 VITALS
DIASTOLIC BLOOD PRESSURE: 65 MMHG | HEIGHT: 68 IN | OXYGEN SATURATION: 99 % | WEIGHT: 185.19 LBS | SYSTOLIC BLOOD PRESSURE: 139 MMHG | RESPIRATION RATE: 17 BRPM | TEMPERATURE: 98 F | HEART RATE: 60 BPM

## 2023-08-14 VITALS
OXYGEN SATURATION: 93 % | SYSTOLIC BLOOD PRESSURE: 169 MMHG | HEART RATE: 56 BPM | DIASTOLIC BLOOD PRESSURE: 69 MMHG | RESPIRATION RATE: 17 BRPM

## 2023-08-14 DIAGNOSIS — I77.0 ARTERIOVENOUS FISTULA, ACQUIRED: Chronic | ICD-10-CM

## 2023-08-14 DIAGNOSIS — Z86.79 PERSONAL HISTORY OF OTHER DISEASES OF THE CIRCULATORY SYSTEM: Chronic | ICD-10-CM

## 2023-08-14 DIAGNOSIS — Z95.2 PRESENCE OF PROSTHETIC HEART VALVE: Chronic | ICD-10-CM

## 2023-08-14 DIAGNOSIS — Z95.1 PRESENCE OF AORTOCORONARY BYPASS GRAFT: Chronic | ICD-10-CM

## 2023-08-14 DIAGNOSIS — Z95.2 PRESENCE OF PROSTHETIC HEART VALVE: ICD-10-CM

## 2023-08-14 DIAGNOSIS — Z90.49 ACQUIRED ABSENCE OF OTHER SPECIFIED PARTS OF DIGESTIVE TRACT: Chronic | ICD-10-CM

## 2023-08-14 LAB
ANION GAP SERPL CALC-SCNC: 13 MMOL/L — SIGNIFICANT CHANGE UP (ref 5–17)
BASOPHILS # BLD AUTO: 0.04 K/UL — SIGNIFICANT CHANGE UP (ref 0–0.2)
BASOPHILS NFR BLD AUTO: 0.6 % — SIGNIFICANT CHANGE UP (ref 0–2)
BUN SERPL-MCNC: 32.9 MG/DL — HIGH (ref 8–20)
CALCIUM SERPL-MCNC: 8.6 MG/DL — SIGNIFICANT CHANGE UP (ref 8.4–10.5)
CHLORIDE SERPL-SCNC: 102 MMOL/L — SIGNIFICANT CHANGE UP (ref 96–108)
CO2 SERPL-SCNC: 25 MMOL/L — SIGNIFICANT CHANGE UP (ref 22–29)
CREAT SERPL-MCNC: 1.82 MG/DL — HIGH (ref 0.5–1.3)
EGFR: 41 ML/MIN/1.73M2 — LOW
EOSINOPHIL # BLD AUTO: 0.39 K/UL — SIGNIFICANT CHANGE UP (ref 0–0.5)
EOSINOPHIL NFR BLD AUTO: 5.8 % — SIGNIFICANT CHANGE UP (ref 0–6)
GLUCOSE SERPL-MCNC: 94 MG/DL — SIGNIFICANT CHANGE UP (ref 70–99)
HCT VFR BLD CALC: 36.7 % — LOW (ref 39–50)
HGB BLD-MCNC: 11.9 G/DL — LOW (ref 13–17)
IMM GRANULOCYTES NFR BLD AUTO: 0.3 % — SIGNIFICANT CHANGE UP (ref 0–0.9)
LYMPHOCYTES # BLD AUTO: 1.19 K/UL — SIGNIFICANT CHANGE UP (ref 1–3.3)
LYMPHOCYTES # BLD AUTO: 17.6 % — SIGNIFICANT CHANGE UP (ref 13–44)
MCHC RBC-ENTMCNC: 28.2 PG — SIGNIFICANT CHANGE UP (ref 27–34)
MCHC RBC-ENTMCNC: 32.4 GM/DL — SIGNIFICANT CHANGE UP (ref 32–36)
MCV RBC AUTO: 87 FL — SIGNIFICANT CHANGE UP (ref 80–100)
MONOCYTES # BLD AUTO: 0.59 K/UL — SIGNIFICANT CHANGE UP (ref 0–0.9)
MONOCYTES NFR BLD AUTO: 8.7 % — SIGNIFICANT CHANGE UP (ref 2–14)
NEUTROPHILS # BLD AUTO: 4.53 K/UL — SIGNIFICANT CHANGE UP (ref 1.8–7.4)
NEUTROPHILS NFR BLD AUTO: 67 % — SIGNIFICANT CHANGE UP (ref 43–77)
PLATELET # BLD AUTO: 153 K/UL — SIGNIFICANT CHANGE UP (ref 150–400)
POTASSIUM SERPL-MCNC: 3.3 MMOL/L — LOW (ref 3.5–5.3)
POTASSIUM SERPL-SCNC: 3.3 MMOL/L — LOW (ref 3.5–5.3)
RBC # BLD: 4.22 M/UL — SIGNIFICANT CHANGE UP (ref 4.2–5.8)
RBC # FLD: 14.1 % — SIGNIFICANT CHANGE UP (ref 10.3–14.5)
SODIUM SERPL-SCNC: 139 MMOL/L — SIGNIFICANT CHANGE UP (ref 135–145)
WBC # BLD: 6.76 K/UL — SIGNIFICANT CHANGE UP (ref 3.8–10.5)
WBC # FLD AUTO: 6.76 K/UL — SIGNIFICANT CHANGE UP (ref 3.8–10.5)

## 2023-08-14 PROCEDURE — 80048 BASIC METABOLIC PNL TOTAL CA: CPT

## 2023-08-14 PROCEDURE — 85025 COMPLETE CBC W/AUTO DIFF WBC: CPT

## 2023-08-14 PROCEDURE — 93312 ECHO TRANSESOPHAGEAL: CPT

## 2023-08-14 PROCEDURE — 93325 DOPPLER ECHO COLOR FLOW MAPG: CPT

## 2023-08-14 PROCEDURE — 36415 COLL VENOUS BLD VENIPUNCTURE: CPT

## 2023-08-14 PROCEDURE — 93320 DOPPLER ECHO COMPLETE: CPT

## 2023-08-14 RX ORDER — POTASSIUM CHLORIDE 20 MEQ
10 PACKET (EA) ORAL
Refills: 0 | Status: COMPLETED | OUTPATIENT
Start: 2023-08-14 | End: 2023-08-14

## 2023-08-14 RX ORDER — POTASSIUM CHLORIDE 20 MEQ
10 PACKET (EA) ORAL
Refills: 0 | Status: DISCONTINUED | OUTPATIENT
Start: 2023-08-14 | End: 2023-08-14

## 2023-08-14 RX ORDER — POTASSIUM CHLORIDE 20 MEQ
40 PACKET (EA) ORAL ONCE
Refills: 0 | Status: COMPLETED | OUTPATIENT
Start: 2023-08-14 | End: 2023-08-14

## 2023-08-14 RX ORDER — POTASSIUM CHLORIDE 20 MEQ
1 PACKET (EA) ORAL
Qty: 30 | Refills: 0
Start: 2023-08-14 | End: 2023-09-12

## 2023-08-14 RX ADMIN — Medication 100 MILLIEQUIVALENT(S): at 11:14

## 2023-08-14 RX ADMIN — Medication 40 MILLIEQUIVALENT(S): at 11:15

## 2023-08-14 RX ADMIN — Medication 100 MILLIEQUIVALENT(S): at 10:00

## 2023-08-14 NOTE — DISCHARGE NOTE NURSING/CASE MANAGEMENT/SOCIAL WORK - NSPROMEDSBROUGHTTOHOSP_GEN_A_NUR
[FreeTextEntry1] : patient will follow up i if needed. Warning signs, follow up, and restrictions were discussed with the patient. 
no

## 2023-08-14 NOTE — PROGRESS NOTE ADULT - SUBJECTIVE AND OBJECTIVE BOX
CCL NP    S/P MUSTAPHA which revealed bioprosthetic AVR/MVR.  No echodensity of mitral valve.  Measurement for watchman procedure done. Full report to follow.  Pt jerry procedure well.  -discharge to home when criteria met CCL NP    S/P MUSTAPHA which revealed bioprosthetic AVR/MVR.  No echodensity of mitral valve.  Measurement for watchman procedure done. Full report to follow.  Pt jerry procedure well.    Hypokalemia --discharge home on supplements  -discharge to home when criteria met

## 2023-08-14 NOTE — DISCHARGE NOTE PROVIDER - NSDCCPCAREPLAN_GEN_ALL_CORE_FT
PRINCIPAL DISCHARGE DIAGNOSIS  Diagnosis: PAF (paroxysmal atrial fibrillation)  Assessment and Plan of Treatment: cont AC  cont work up for watchman procedure

## 2023-08-14 NOTE — DISCHARGE NOTE NURSING/CASE MANAGEMENT/SOCIAL WORK - NSDCVIVACCINE_GEN_ALL_CORE_FT
influenza, injectable, quadrivalent, preservative free; 12-Dec-2020 09:28; Lynn Gerardo); OpenPortal; 724k2 (Exp. Date: 30-Jun-2021); IntraMuscular; Deltoid Right.; 0.5 milliLiter(s); VIS (VIS Published: 15-Aug-2019, VIS Presented: 12-Dec-2020);

## 2023-08-14 NOTE — DISCHARGE NOTE PROVIDER - NSDCMRMEDTOKEN_GEN_ALL_CORE_FT
amLODIPine 10 mg oral tablet: 1 tab(s) orally once a day  atorvastatin 40 mg oral tablet: 1 orally once a day  carvedilol 6.25 mg oral tablet: 1 orally 2 times a day  Eliquis 5 mg oral tablet: 1 orally once a day  levETIRAcetam 1000 mg oral tablet: 1 tab(s) orally 2 times a day  oyster shell 500mg BID:   tamsulosin 0.4 mg oral capsule: 1 cap(s) orally once a day (at bedtime)  torsemide 20 mg oral tablet: 1 tab(s) orally every other day

## 2023-08-14 NOTE — DISCHARGE NOTE PROVIDER - NSDCCPTREATMENT_GEN_ALL_CORE_FT
PRINCIPAL PROCEDURE  Procedure: Transesophageal echocardiogram (MUSTAPHA)  Findings and Treatment: work up for watchman procedure

## 2023-08-14 NOTE — DISCHARGE NOTE PROVIDER - CARE PROVIDERS DIRECT ADDRESSES
Reason for Disposition  • Second attempt to contact caller AND no contact made. Phone number verified.    Protocols used: NO CONTACT OR DUPLICATE CONTACT CALL-A-AH    
Called 2x , no answer, left  2 messages in the   Voicemail to  Call back .   
Regarding: [place]- high blood sugar   ----- Message from Anna Richmond sent at 9/10/2022  8:47 AM CDT -----  Patient Name: Alicia Swenson    Specialist or PCP Name: jia thakkar    Symptoms: high blood sugar 220     Pregnant (females aged 13-60. If Yes, how long?) : no     Call Back # : [phone]    Which State are you currently located in[place]     Name of Clinic Site / Acct# : n/a     Use following scripting for patients waiting for a callback:   “Nurse callback times vary based on call volumes; please be aware the return phone call may come from an unidentified phone number. If your symptoms worsen or become life threatening while waiting, you should seek immediate assistance by calling 911 or going to the ER for evaluation.”  
,DirectAddress_Unknown,[email or web address]

## 2023-08-14 NOTE — DISCHARGE NOTE PROVIDER - NSDCACTIVITY_GEN_ALL_CORE
Do not drive or operate machinery/Showering allowed/Do not make important decisions/Stairs allowed/Driving allowed/Walking - Indoors allowed/Walking - Outdoors allowed

## 2023-08-14 NOTE — DISCHARGE NOTE PROVIDER - HOSPITAL COURSE
63 year old male presents for MUSTAPHA as part of work up for possible Watchman Procedure.   Pt jerry procedure well.  No echodensity on Mitral Valve. Low Suspicion of COVID-19

## 2023-08-14 NOTE — DISCHARGE NOTE NURSING/CASE MANAGEMENT/SOCIAL WORK - PATIENT PORTAL LINK FT
You can access the FollowMyHealth Patient Portal offered by [facility] by registering at the following website: [email or web address]. By joining [employer]’s FollowMyHealth portal, you will also be able to view your health information using other applications (apps) compatible with our system.

## 2023-08-14 NOTE — DISCHARGE NOTE PROVIDER - NSDCFUSCHEDAPPT_GEN_ALL_CORE_FT
James Trevino  [facility]  ELECTROPH [address]  Scheduled Appointment: 08/23/2023    Felice Frankel  [facility]  CARDIOLOGY [address]  Scheduled Appointment: 09/26/2023

## 2023-08-14 NOTE — DISCHARGE NOTE PROVIDER - CARE PROVIDER_API CALL
James Trevino  Cardiac Electrophysiology  [address] [facility], [address] [place] [address]  Phone: [phone]  Fax: [phone]  Follow Up Time:     Felice Frankel  Interventional Cardiology  [address] [place] [address], [address] [address]  Phone: [phone]  Fax: [phone]  Follow Up Time:

## 2023-08-23 ENCOUNTER — APPOINTMENT (OUTPATIENT)
Dept: ELECTROPHYSIOLOGY | Facility: CLINIC | Age: 63
End: 2023-08-23
Payer: MEDICARE

## 2023-08-23 VITALS
SYSTOLIC BLOOD PRESSURE: 180 MMHG | HEIGHT: 68 IN | HEART RATE: 63 BPM | BODY MASS INDEX: 28.49 KG/M2 | OXYGEN SATURATION: 98 % | WEIGHT: 188 LBS | DIASTOLIC BLOOD PRESSURE: 62 MMHG | TEMPERATURE: 98.3 F

## 2023-08-23 PROCEDURE — 99215 OFFICE O/P EST HI 40 MIN: CPT

## 2023-08-23 PROCEDURE — 93000 ELECTROCARDIOGRAM COMPLETE: CPT

## 2023-08-23 NOTE — DISCUSSION/SUMMARY
[FreeTextEntry1] : In summary, the patient is a 63 year old male with a history of atrial fibrillation/flutter s/p ablation, CAD s/p CABG, valve disease s/p AVR and MV repair, mild LV dysfunction (now recovered), aortic dissection s/p repair, ESRD on dialysis, small bowel ischemia s/p resection and recently subdural/subarachnoid hemorrhage while on eliquis. His CHADSVASC score is 2. Given the spontaneous nature of the intracranial hemorrhage risk of recurrence while on eliquis remains high. He is referred for a BLAINE occlusion device such as Watchman/Amplatzer. I agree he is a good candidate for this procedure for stroke prevention in lieu of oral anticoagulation. We discussed the details of the procedure including potential complications which include but are not limited to bleeding, hematoma, infection, damage to blood vessels, cardiac perforation, device dislodgement/embolization requiring surgery, MI, stroke and DVT. The patient expressed understanding and wishes to proceed. I will obtain a cardiac CT to better define BLAINE anatomy prior to the procedure.  [EKG obtained to assist in diagnosis and management of assessed problem(s)] : EKG obtained to assist in diagnosis and management of assessed problem(s)

## 2023-08-23 NOTE — HISTORY OF PRESENT ILLNESS
[FreeTextEntry1] : Mr. Cummins is a pleasant 62-year-old male here for follow up today. The patient has a history of aortic dissection s/p repair, CAD s/p CABG, aortic and mitral valve disease s/p AVR and MV repair, bowel ischemia s/p resection, ESRD on dialysis, HTN, AVMs s/p cauterization, and atrial fibrillation/flutter s/p PVI+CTI+mitral line in 9/2021 with me), PVCs, and recently spontaneous subdural/subarachnoid hemorrhage.   The patient was admitted to [facility] in 6/2023 after being found down at home and subsequently noted to have sudural/subarachnoid hemorrhage. Eliquis was stopped. TTE at the time noted a possible echogenicity on the mitral valve but MUSTAPHA was deferred because of his condition. He was eventually discharged home off eliquis. Neurosurgery eventually cleared him to resume eliquis. Eliquis 5 mg bid was resumed on 8/3/23. He has been tolerating it well since. He is referred for a LAAO/Watchman device in lieu of long-term anticoagulation. He wore an outpatient monitor recently which did not show any evidence of atrial fibrillation. MUSTAPHA was performed as outpatient given suspicion for MV density. No vegetation was seen. BLAINE measurements were taken. LVEF was reported as 60%.

## 2023-08-23 NOTE — CARDIOLOGY SUMMARY
[de-identified] : 8/23/23: normal sinus rhythm [de-identified] : 8/14/23 MUSTAPHA: LVEF 55-60%, BLAINE major diameter 21.8 mm, minor diameter 17.8 mm, max depth 22.9 mm

## 2023-09-23 ENCOUNTER — INPATIENT (INPATIENT)
Facility: HOSPITAL | Age: 63
LOS: 22 days | Discharge: NURSING, REHAB OR LONG-TERM CARE | DRG: 252 | End: 2023-10-16
Attending: HOSPITALIST | Admitting: STUDENT IN AN ORGANIZED HEALTH CARE EDUCATION/TRAINING PROGRAM
Payer: MEDICARE

## 2023-09-23 VITALS
DIASTOLIC BLOOD PRESSURE: 69 MMHG | SYSTOLIC BLOOD PRESSURE: 144 MMHG | TEMPERATURE: 98 F | HEART RATE: 99 BPM | OXYGEN SATURATION: 98 % | WEIGHT: 170.42 LBS | HEIGHT: 68 IN | RESPIRATION RATE: 20 BRPM

## 2023-09-23 DIAGNOSIS — I77.0 ARTERIOVENOUS FISTULA, ACQUIRED: Chronic | ICD-10-CM

## 2023-09-23 DIAGNOSIS — N17.9 ACUTE KIDNEY FAILURE, UNSPECIFIED: ICD-10-CM

## 2023-09-23 DIAGNOSIS — Z95.2 PRESENCE OF PROSTHETIC HEART VALVE: Chronic | ICD-10-CM

## 2023-09-23 DIAGNOSIS — Z86.79 PERSONAL HISTORY OF OTHER DISEASES OF THE CIRCULATORY SYSTEM: Chronic | ICD-10-CM

## 2023-09-23 DIAGNOSIS — Z90.49 ACQUIRED ABSENCE OF OTHER SPECIFIED PARTS OF DIGESTIVE TRACT: Chronic | ICD-10-CM

## 2023-09-23 DIAGNOSIS — Z95.1 PRESENCE OF AORTOCORONARY BYPASS GRAFT: Chronic | ICD-10-CM

## 2023-09-23 LAB
ALBUMIN SERPL ELPH-MCNC: 3.1 G/DL — LOW (ref 3.3–5.2)
ALP SERPL-CCNC: 178 U/L — HIGH (ref 40–120)
ALT FLD-CCNC: 35 U/L — SIGNIFICANT CHANGE UP
ANION GAP SERPL CALC-SCNC: 22 MMOL/L — HIGH (ref 5–17)
APTT BLD: 33.3 SEC — SIGNIFICANT CHANGE UP (ref 24.5–35.6)
AST SERPL-CCNC: 29 U/L — SIGNIFICANT CHANGE UP
BASOPHILS # BLD AUTO: 0.07 K/UL — SIGNIFICANT CHANGE UP (ref 0–0.2)
BASOPHILS NFR BLD AUTO: 0.3 % — SIGNIFICANT CHANGE UP (ref 0–2)
BILIRUB SERPL-MCNC: 0.8 MG/DL — SIGNIFICANT CHANGE UP (ref 0.4–2)
BUN SERPL-MCNC: 71.4 MG/DL — HIGH (ref 8–20)
CALCIUM SERPL-MCNC: 9.2 MG/DL — SIGNIFICANT CHANGE UP (ref 8.4–10.5)
CHLORIDE SERPL-SCNC: 91 MMOL/L — LOW (ref 96–108)
CO2 SERPL-SCNC: 14 MMOL/L — LOW (ref 22–29)
CREAT SERPL-MCNC: 3.5 MG/DL — HIGH (ref 0.5–1.3)
EGFR: 19 ML/MIN/1.73M2 — LOW
EOSINOPHIL # BLD AUTO: 0.02 K/UL — SIGNIFICANT CHANGE UP (ref 0–0.5)
EOSINOPHIL NFR BLD AUTO: 0.1 % — SIGNIFICANT CHANGE UP (ref 0–6)
GLUCOSE SERPL-MCNC: 127 MG/DL — HIGH (ref 70–99)
HCT VFR BLD CALC: 44.8 % — SIGNIFICANT CHANGE UP (ref 39–50)
HGB BLD-MCNC: 15 G/DL — SIGNIFICANT CHANGE UP (ref 13–17)
IMM GRANULOCYTES NFR BLD AUTO: 0.6 % — SIGNIFICANT CHANGE UP (ref 0–0.9)
INR BLD: 1.51 RATIO — HIGH (ref 0.85–1.18)
LIDOCAIN IGE QN: 52 U/L — HIGH (ref 22–51)
LYMPHOCYTES # BLD AUTO: 1.08 K/UL — SIGNIFICANT CHANGE UP (ref 1–3.3)
LYMPHOCYTES # BLD AUTO: 4.5 % — LOW (ref 13–44)
MCHC RBC-ENTMCNC: 28 PG — SIGNIFICANT CHANGE UP (ref 27–34)
MCHC RBC-ENTMCNC: 33.5 GM/DL — SIGNIFICANT CHANGE UP (ref 32–36)
MCV RBC AUTO: 83.7 FL — SIGNIFICANT CHANGE UP (ref 80–100)
MONOCYTES # BLD AUTO: 1.62 K/UL — HIGH (ref 0–0.9)
MONOCYTES NFR BLD AUTO: 6.8 % — SIGNIFICANT CHANGE UP (ref 2–14)
NEUTROPHILS # BLD AUTO: 21 K/UL — HIGH (ref 1.8–7.4)
NEUTROPHILS NFR BLD AUTO: 87.7 % — HIGH (ref 43–77)
NT-PROBNP SERPL-SCNC: 4805 PG/ML — HIGH (ref 0–300)
PLATELET # BLD AUTO: 122 K/UL — LOW (ref 150–400)
POTASSIUM SERPL-MCNC: 5.1 MMOL/L — SIGNIFICANT CHANGE UP (ref 3.5–5.3)
POTASSIUM SERPL-SCNC: 5.1 MMOL/L — SIGNIFICANT CHANGE UP (ref 3.5–5.3)
PROT SERPL-MCNC: 8.5 G/DL — SIGNIFICANT CHANGE UP (ref 6.6–8.7)
PROTHROM AB SERPL-ACNC: 16.6 SEC — HIGH (ref 9.5–13)
RBC # BLD: 5.35 M/UL — SIGNIFICANT CHANGE UP (ref 4.2–5.8)
RBC # FLD: 12.7 % — SIGNIFICANT CHANGE UP (ref 10.3–14.5)
SODIUM SERPL-SCNC: 127 MMOL/L — LOW (ref 135–145)
TROPONIN T SERPL-MCNC: 0.08 NG/ML — HIGH (ref 0–0.06)
TROPONIN T SERPL-MCNC: 0.09 NG/ML — HIGH (ref 0–0.06)
WBC # BLD: 23.94 K/UL — HIGH (ref 3.8–10.5)
WBC # FLD AUTO: 23.94 K/UL — HIGH (ref 3.8–10.5)

## 2023-09-23 PROCEDURE — 99223 1ST HOSP IP/OBS HIGH 75: CPT

## 2023-09-23 PROCEDURE — 93010 ELECTROCARDIOGRAM REPORT: CPT

## 2023-09-23 PROCEDURE — 71045 X-RAY EXAM CHEST 1 VIEW: CPT | Mod: 26

## 2023-09-23 PROCEDURE — 99285 EMERGENCY DEPT VISIT HI MDM: CPT

## 2023-09-23 PROCEDURE — 74176 CT ABD & PELVIS W/O CONTRAST: CPT | Mod: 26,MA

## 2023-09-23 RX ORDER — SODIUM CHLORIDE 9 MG/ML
1000 INJECTION INTRAMUSCULAR; INTRAVENOUS; SUBCUTANEOUS
Refills: 0 | Status: DISCONTINUED | OUTPATIENT
Start: 2023-09-23 | End: 2023-09-24

## 2023-09-23 RX ORDER — LANOLIN ALCOHOL/MO/W.PET/CERES
3 CREAM (GRAM) TOPICAL AT BEDTIME
Refills: 0 | Status: DISCONTINUED | OUTPATIENT
Start: 2023-09-23 | End: 2023-10-01

## 2023-09-23 RX ORDER — APIXABAN 2.5 MG/1
5 TABLET, FILM COATED ORAL
Refills: 0 | Status: DISCONTINUED | OUTPATIENT
Start: 2023-09-24 | End: 2023-09-27

## 2023-09-23 RX ORDER — AMLODIPINE BESYLATE 2.5 MG/1
10 TABLET ORAL DAILY
Refills: 0 | Status: DISCONTINUED | OUTPATIENT
Start: 2023-09-23 | End: 2023-10-01

## 2023-09-23 RX ORDER — ONDANSETRON 8 MG/1
4 TABLET, FILM COATED ORAL ONCE
Refills: 0 | Status: COMPLETED | OUTPATIENT
Start: 2023-09-23 | End: 2023-09-23

## 2023-09-23 RX ORDER — LEVETIRACETAM 250 MG/1
1000 TABLET, FILM COATED ORAL
Refills: 0 | Status: DISCONTINUED | OUTPATIENT
Start: 2023-09-23 | End: 2023-10-01

## 2023-09-23 RX ORDER — PANTOPRAZOLE SODIUM 20 MG/1
40 TABLET, DELAYED RELEASE ORAL ONCE
Refills: 0 | Status: COMPLETED | OUTPATIENT
Start: 2023-09-23 | End: 2023-09-23

## 2023-09-23 RX ORDER — ATORVASTATIN CALCIUM 80 MG/1
40 TABLET, FILM COATED ORAL AT BEDTIME
Refills: 0 | Status: DISCONTINUED | OUTPATIENT
Start: 2023-09-23 | End: 2023-10-01

## 2023-09-23 RX ORDER — ACETAMINOPHEN 500 MG
650 TABLET ORAL EVERY 6 HOURS
Refills: 0 | Status: DISCONTINUED | OUTPATIENT
Start: 2023-09-23 | End: 2023-10-01

## 2023-09-23 RX ORDER — CARVEDILOL PHOSPHATE 80 MG/1
6.25 CAPSULE, EXTENDED RELEASE ORAL EVERY 12 HOURS
Refills: 0 | Status: DISCONTINUED | OUTPATIENT
Start: 2023-09-23 | End: 2023-10-01

## 2023-09-23 RX ORDER — TAMSULOSIN HYDROCHLORIDE 0.4 MG/1
0.4 CAPSULE ORAL AT BEDTIME
Refills: 0 | Status: DISCONTINUED | OUTPATIENT
Start: 2023-09-23 | End: 2023-10-01

## 2023-09-23 RX ORDER — ONDANSETRON 8 MG/1
4 TABLET, FILM COATED ORAL EVERY 8 HOURS
Refills: 0 | Status: DISCONTINUED | OUTPATIENT
Start: 2023-09-23 | End: 2023-10-01

## 2023-09-23 RX ORDER — SODIUM CHLORIDE 9 MG/ML
1000 INJECTION, SOLUTION INTRAVENOUS ONCE
Refills: 0 | Status: COMPLETED | OUTPATIENT
Start: 2023-09-23 | End: 2023-09-23

## 2023-09-23 RX ADMIN — PANTOPRAZOLE SODIUM 40 MILLIGRAM(S): 20 TABLET, DELAYED RELEASE ORAL at 12:21

## 2023-09-23 RX ADMIN — ATORVASTATIN CALCIUM 40 MILLIGRAM(S): 80 TABLET, FILM COATED ORAL at 21:36

## 2023-09-23 RX ADMIN — SODIUM CHLORIDE 250 MILLILITER(S): 9 INJECTION, SOLUTION INTRAVENOUS at 12:21

## 2023-09-23 RX ADMIN — SODIUM CHLORIDE 50 MILLILITER(S): 9 INJECTION INTRAMUSCULAR; INTRAVENOUS; SUBCUTANEOUS at 23:30

## 2023-09-23 RX ADMIN — ONDANSETRON 4 MILLIGRAM(S): 8 TABLET, FILM COATED ORAL at 12:21

## 2023-09-23 RX ADMIN — TAMSULOSIN HYDROCHLORIDE 0.4 MILLIGRAM(S): 0.4 CAPSULE ORAL at 21:36

## 2023-09-23 NOTE — H&P ADULT - NSHPPHYSICALEXAM_GEN_ALL_CORE
T(C): 36.1 (09-23-23 @ 11:46), Max: 36.5 (09-23-23 @ 10:08)  HR: 84 (09-23-23 @ 11:46) (84 - 99)  BP: 143/70 (09-23-23 @ 11:46) (143/70 - 144/69)  RR: 20 (09-23-23 @ 11:46) (20 - 20)  SpO2: 98% (09-23-23 @ 11:46) (98% - 98%)    CONSTITUTIONAL: NAD, resting in bed  EYES: PERRLA and symmetric, EOMI,  ENMT: Oral mucosa dry  RESP:  CTA b/l  CV: RRR, +S1S2,  no peripheral edema  GI: Soft, NT, ND, no rebound, no guarding; scarring noted around umbilicus from prior surgeries, BS positive in all quadrants   MSK: gait deferred, ROM normal in all extremities, no peripheral edema noted, strength 5/5 in all extremities   SKIN: No rashes or ulcers noted; no subcutaneous nodules or induration palpable  NEURO: CN II-XII intact; normal reflexes in upper and lower extremities, sensation intact in upper and lower extremities b/l to light touch   PSYCH: Appropriate insight/judgment; A+O x 3, mood and affect appropriate, recent/remote memory intact

## 2023-09-23 NOTE — H&P ADULT - HISTORY OF PRESENT ILLNESS
Mr. Cummins is a 64 yo M with a PMH significant for HTN, HLD, Type A dissection / repeat sternotomy, and CABG x2, CHF (mixed systolic / diastolic), mitral / aortic valve replacement, CKD (with short term on HD 2022), A.fib s/p ablation pending watchman, SDH (resolved as of July imaging) who presents to [facility] for ~ 2 weeks of inability to tolerate oral intake. Patient states that over the last 2 weeks he has progressively lost the ability to stomach foods, beginning with solids and now also involving liquids when he takes in too much. He states he has been to 2 car shows within the last 2 weeks (both on Sundays) and noticed the symptoms begin the day after the first car show on 9/11. 10-15 minutes after intaking food or large amounts of liquids he will feel the need to vomit and after vomiting he will feel better. The emesis is NBNB only containing what he ate. Due to this he has been unable to tolerate his usual medicines including his blood thinner. He continues to have bowel movements and pass gas, and feels well if he doesn't try to ingest anything. His only remaining symptom was mild SOB on exertion last night and this morning. He denies fevers/chills, dysuria/ anuria, constipation / diarrhea, recent sick contacts or changes to medications.   In ED patients vitals were wnl. Lab work showed hyponatremia of 127, with hypochloremia of 91, bicarb of 14, BUN 71.4, and Cr 3.5 as well as elevated WBC to 23.94 and elevated Trp to .09 with BNP 4805. Imaging with CT Abd/pelvis showed no acute pathology. Given 1L LR in ED, to be continued with NS at 50 cc'hr given likely dehydrated status, Nephrology and Cardiology consulted in ED. Patient to be admitted to medical floors for further monitoring.

## 2023-09-23 NOTE — ED ADULT NURSE REASSESSMENT NOTE - NS ED NURSE REASSESS COMMENT FT1
Patient discharged by provider MD Winkler. No signs of acute distress or change in mental status noted, respirations even and unlabored. Refer to provider notes.

## 2023-09-23 NOTE — ED PROVIDER NOTE - NSICDXPASTMEDICALHX_GEN_ALL_CORE_FT
PAST MEDICAL HISTORY:  Atrial fibrillation     CAD (coronary artery disease) s/p PCI 1998  and CABG x 2 11/2020    CHF (congestive heart failure)     Chronic atrial fibrillation     COVID-19 october 2020    CVA (cerebral vascular accident)     Former smoker     GIB (gastrointestinal bleeding)     H/O aortic dissection s/p repair (2010), surgery complicated by bowel ischemia s/p bowel resection and ostomy with reversal    H/O aortic valve insufficiency     History of cocaine use remote hx, currently sober    HTN (hypertension)     Hyperlipemia     Mitral regurgitation     Seizures last seizure 10 years ago

## 2023-09-23 NOTE — H&P ADULT - TIME BILLING
Extensive history, complex medical problems requiring extended chart review and review with patient. Labs and imaging reviewed independently, plan of care discussed with ED / patient.

## 2023-09-23 NOTE — ED PROVIDER NOTE - ATTENDING CONTRIBUTION TO CARE
Dr. Rand, Attending Physician-  I performed a face to face bedside interview with patient regarding history of present illness, review of symptoms and past medical history. I completed an independent physical exam.  I have discussed patient's plan of care with the resident.

## 2023-09-23 NOTE — ED ADULT NURSE NOTE - OBJECTIVE STATEMENT
A&Ox4, resp even/unlabored, ambulatory, steady gait noted, NAD. Pt presenting to ED c/o vomiting after eating or drinking anything since 1 week. Pt reports not being able keep anything down. Denies pain or constipation. Admits he has not been taking his medications for 1 week due to inability to keep anything down. Also endorses SOB at rest and upon exertion for 3 days. Denies chest pain, palpitations, or any other complaints.

## 2023-09-23 NOTE — PATIENT PROFILE ADULT - FUNCTIONAL ASSESSMENT - BASIC MOBILITY 6.
4-calculated by average/Not able to assess (calculate score using [facility] averaging method)

## 2023-09-23 NOTE — ED PROVIDER NOTE - OBJECTIVE STATEMENT
63/M,  PMHX of HTN, Type A dissection then repeat sternotomy in 11/2020 with CABG x2, systolic CHF, mitral valve and aortic valve replacement, SBO ( non-surgically manages) ESRD, non-compliant with medications, presented with complaints of vomiting after eating or drinking anything since 1 week. Pt reports not being able keep anything down, but no pain, constipation/obstipation. Has not been taking his medications since 1 week. Reports SOB since 3 days on exertion and rest. No chest pain, palpitations or calf pain.

## 2023-09-23 NOTE — ED PROVIDER NOTE - CLINICAL SUMMARY MEDICAL DECISION MAKING FREE TEXT BOX
63/M,  PMHX of HTN, Type A dissection then repeat sternotomy in 11/2020 with CABG x2, systolic CHF, mitral valve and aortic valve replacement, SBO ( non-surgically manages) ESRD, non-compliant with medications, presented with complaints of vomiting after eating or drinking anything since 1 week. Pt reports not being able keep anything down, but no pain, constipation/obstipation. Has not been taking his medications since 1 week. Reports SOB since 3 days on exertion and rest. Pt vitally stable. Physical examination unremarkable.     Pt evaluated for SBO and SOB. Bloodwork remarkable for CBC -50637, creatinine of 1.85 and trop t- 0.09 ( repeat sent) and BNP 4000. Chest xray unremarkable, CT abdomen without any acute findings. Pt found to have SANTOS on CKD, to be admitted for further workup. Case discussed with Dr. Cleary who accepted admission. Consult placed with [facility] cardiology and nephrology.

## 2023-09-23 NOTE — ED ADULT NURSE NOTE - NSFALLUNIVINTERV_ED_ALL_ED
Bed/Stretcher in lowest position, wheels locked, appropriate side rails in place/Call bell, personal items and telephone in reach/Instruct patient to call for assistance before getting out of bed/chair/stretcher/Non-slip footwear applied when patient is off stretcher/Victor to call system/Physically safe environment - no spills, clutter or unnecessary equipment/Purposeful proactive rounding/Room/bathroom lighting operational, light cord in reach

## 2023-09-23 NOTE — PATIENT PROFILE ADULT - FALL HARM RISK - HARM RISK INTERVENTIONS

## 2023-09-23 NOTE — H&P ADULT - ASSESSMENT
Mr. Cummins is a 64 yo M with a PMH significant for HTN, HLD, Type A dissection / repeat sternotomy, and CABG x2, CHF (mixed systolic / diastolic), mitral / aortic valve replacement, CKD (with short term on HD 2022), A.fib s/p ablation pending watchman, SDH (resolved as of July imaging) who presents to [facility] for ~ 2 weeks of inability to tolerate oral intake. Labs consistent with acute renal failure likely in the setting of GI losses/ Poor PO intake, however patient with known HF (last EF 55-60% on MUSTAPHA), given 1L LR in ED, will continue with gentle fluid resuscitation with NS 50 cc/hr and monitor fluid balance/ Renal function. nephrology / cardiology consulted.     #Acute Renal Failure, associated metabolic acidosis   Likely Pre renal, in setting of GI losses / Poor PO intake   - Given 1L LR in ED, will continue with NS at 50 cc/hr for 24 hrs, if signs of volume overload / SOB will halt fluids and give IV lasix   - MUSTAPHA 8/14/23 showed EF 55-60%  - Cr 3.5, baseline from last DC appears to be ~ 1.5-1.8, however patient did need short term HD in the recent past   - Nephrology consulted     #Intractable Nausea / Vomiting   ~ 2 weeks of progressively worsening inability to tolerate PO beginning with solids, now large volume liquids, no dysphagia, patient tolerating sips of liquid  - CLD started for now, Zofran PRN, will advance diet as tolerated   - Possibly in setting of viral illness with elevated WBC, worsened by uremia? CT A/P shows no acute pathology  - IVF's as needed if patient cannot tolerate adequate PO     #Heart Failure with Preserved Ejection Fraction   Follows Dr. Frankel   - Continues on Coreg, will hold torsemide in light of likely dehydration / hyponatremia, resume when clinically appropriate   - Recent MUSTAPHA as above with EF 55-60%  - Trp and BNP likely elevated in setting of renal failure given lack of cardiac symptoms / no edema, CXR does not appear congested   - Cardiology consulted     #Elevated Trp   Likely in setting of renal failure   - .09 -> .08 will obtain 1 further value, if continues to downtrend will stop following   - Patient denies cardiac symptoms at this time     #Leukocytosis   WBC 23.94 on admission, no clear infectious source   - patient afebrile, possibly viral origin vs. concentration, will obtain Blood cx, UA, continue to monitor off Abxs at this time     #Hyponatremia   In setting of poor oral intake, Na 127  - Given 1L LR, and continues on NS 50 cc/hr   - recheck CMP in AM     #paroxysmal Atrial fibrillation   - Patient continues on Eliquis per Cardiology, however planned for Watchman procedure this coming Oct  - Rate controlled currently, continue BB, monitor on Tele   - Stopped briefly AC due to SDH in recent past, however CT head 7/2023 shows complete resolution of SDH, resumed AC outpatient per Cardiology     #history of SDH   As above, CT head showed complete resolution of recent SDH, continues on AC     #Mitral/Aortic valve replacement   -Recent MUSTAPHA shows bioprosthetic valves with only moderate paravalvular leak in MV.     DVT prophylaxis: Eliquis   Diet: CLD will advance as tolerated   Dispo: pending clinical improvement, Nephro / Cardio Recs

## 2023-09-23 NOTE — ED ADULT NURSE REASSESSMENT NOTE - NS ED NURSE REASSESS COMMENT FT1
Rounds frequently provided for pt comfort and safety. No acute distress or change in mental status noted. Pt expresses no other needs at this time. No further concerns as of present. Call bell within reach. Plan of care ongoing.

## 2023-09-23 NOTE — ED PROVIDER NOTE - NS ED ROS FT
Review of Systems:  	•	CONSTITUTIONAL - no fever, no diaphoresis, no weight change  	•	SKIN - no rash  	•	HEMATOLOGIC - no bleeding, no bruising  	•	EYES - no eye pain, no blurred vision  	•	RESPIRATORY - shortness of breath+, no cough  	•	CARDIAC - no chest pain, no palpitations  	•	GI - no abd pain, nausea, vomiting +, no diarrhea, no constipation, no bleeding  	•	MUSCULOSKELETAL - no joint pain, no swelling, no redness  	•	NEUROLOGIC - no weakness, no headache, no anesthesia, no paresthesias  	•	PSYCH - no anxiety, non suicidal, non homicidal, no hallucination, no depression  	•	ALLERGY - no rhinitis

## 2023-09-23 NOTE — ED PROVIDER NOTE - PROGRESS NOTE DETAILS
s/p IVF LR over 4 hours. S/p IV protonix and IV zofran.   Vitals stable other home to resumed after blood tests.

## 2023-09-23 NOTE — ED PROVIDER NOTE - PHYSICAL EXAMINATION
Const: Awake, alert and oriented.   HEENT: NC/AT. Dry mucous membranes.  Eyes: No scleral icterus.   Cardiac: Regular rate and regular rhythm. +S1/S2. Peripheral pulses 2+ and symmetric. No LE edema.  Resp: Speaking in full sentences. No evidence of respiratory distress. Decreased breath sounds  Abd: Soft, non-tender, non-distended. Normal bowel sounds in all 4 quadrants. No guarding or rebound.  Skin: No rashes, abrasions or lacerations.  Lymph: No cervical lymphadenopathy.  Neuro: Awake, alert & oriented x 3. Moves all extremities symmetrically.

## 2023-09-23 NOTE — ED ADULT NURSE REASSESSMENT NOTE - NS ED NURSE REASSESS COMMENT FT1
Patient resting comfortably in bed, awaiting CT. No signs of acute distress or change in mental status noted, safety maintained.

## 2023-09-23 NOTE — ED PROVIDER NOTE - WR ORDER STATUS 1
Physical Therapy Visit    Referred by: Florida Puente MD; Medical Diagnosis (from order):    Diagnosis Information      Diagnosis    723.4 (ICD-9-CM) - M54.12 (ICD-10-CM) - Cervical radiculopathy              Visit: 4    Visit Type: Daily Treatment Note    SUBJECTIVE                                                                                                               Reports less \"jolts\" as long as he avoids certain motions, hand is progressively feeling more numb and tingly. Hand feels swollen but isn't, just feels heavy  Pain / Symptoms:  Pain rating (out of 10): Current: 0     OBJECTIVE                                                                                                                     Observation:   Comments / Details: Continues to avoid right cervical sidebend which is pain inducing    Strength  (out of 5 unless noted, standard test position unless noted, lbs tested with hand held dynamometer)   : (lbs)    - Neutral:        • Left: Trial(s): 90, 105, 95, Average: 96.67        • Right: Trial(s): 95, 85, 80, Average: 86.67       TREATMENT                                                                                                                  Manual Therapy:  Suboccipital release with gentle manual traction   Nods in supine  STM to right scalenes, cervical paraspinals and upper trap   Gentle PROM cervical rotation  scap protraction/retraction with passive assist       Neuromuscular Re-Education:  Review of previous with reinforcement   median nerve glide , able to tolerate wrist extension   Gentle cervical sidebend to right in pain free range, tolerates minimal motion  Education regarding posture especially avoidance of fwd head and rounded shoulders  Education to avoid positions that increase nerve impingement and increase symptoms    Skilled input: verbal instruction/cues, tactile instruction/cues, posture correction and inhibition    Writer verbally educated and received  verbal consent for hand placement, positioning of patient, and techniques to be performed today from patient for clothing adjustments for techniques, therapist position for techniques and hand placement and palpation for techniques as described above and how they are pertinent to the patient's plan of care.    Home Exercise Program/Education Materials: Access Code: [email or web address]  URL: [email or web address]  Date: 06/30/2022  Prepared by: Belgica Elias    Exercises  · Seated Assisted Cervical Rotation with Towel - 1 x daily - 7 x weekly - 1 sets - 10 reps  · Seated Passive Cervical Retraction - 1 x daily - 7 x weekly - 1 sets - 10 reps  · Cervical Retraction at Wall - 1 x daily - 7 x weekly - 1 sets - 10 reps  · Shoulder Rolls in Sitting - 1 x daily - 7 x weekly - 1 sets - 10 reps  · Standing Bilateral Low Shoulder Row with Anchored Resistance - 1 x daily - 7 x weekly - 3 sets - 10 reps  · Shoulder Extension with Resistance - 1 x daily - 7 x weekly - 3 sets - 10 reps              ASSESSMENT                                                                                                             Good tolerance, fingertips of right hand with constant numbness although changes a little with positioning. Right hand with mild weakness compared to left with   Pain/symptoms after session (out of 10): 0  Patient Education:   Results of above outlined education: Verbalizes understanding, Demonstrates understanding and Needs reinforcement      PLAN                                                                                                                           Suggestions for next session as indicated: Progress per plan of care         Therapy procedure time and total treatment time can be found documented on the Time Entry flowsheet   Resulted

## 2023-09-24 LAB
ALBUMIN SERPL ELPH-MCNC: 2.5 G/DL — LOW (ref 3.3–5.2)
ALP SERPL-CCNC: 125 U/L — HIGH (ref 40–120)
ALT FLD-CCNC: 21 U/L — SIGNIFICANT CHANGE UP
ANION GAP SERPL CALC-SCNC: 18 MMOL/L — HIGH (ref 5–17)
APPEARANCE UR: CLEAR — SIGNIFICANT CHANGE UP
AST SERPL-CCNC: 14 U/L — SIGNIFICANT CHANGE UP
BACTERIA # UR AUTO: NEGATIVE — SIGNIFICANT CHANGE UP
BASOPHILS # BLD AUTO: 0.07 K/UL — SIGNIFICANT CHANGE UP (ref 0–0.2)
BASOPHILS NFR BLD AUTO: 0.3 % — SIGNIFICANT CHANGE UP (ref 0–2)
BILIRUB SERPL-MCNC: 0.7 MG/DL — SIGNIFICANT CHANGE UP (ref 0.4–2)
BILIRUB UR-MCNC: NEGATIVE — SIGNIFICANT CHANGE UP
BUN SERPL-MCNC: 69.6 MG/DL — HIGH (ref 8–20)
CALCIUM SERPL-MCNC: 7.7 MG/DL — LOW (ref 8.4–10.5)
CHLORIDE SERPL-SCNC: 99 MMOL/L — SIGNIFICANT CHANGE UP (ref 96–108)
CO2 SERPL-SCNC: 16 MMOL/L — LOW (ref 22–29)
COLOR SPEC: YELLOW — SIGNIFICANT CHANGE UP
CREAT SERPL-MCNC: 2.89 MG/DL — HIGH (ref 0.5–1.3)
DIFF PNL FLD: ABNORMAL
EGFR: 24 ML/MIN/1.73M2 — LOW
EOSINOPHIL # BLD AUTO: 0.03 K/UL — SIGNIFICANT CHANGE UP (ref 0–0.5)
EOSINOPHIL NFR BLD AUTO: 0.1 % — SIGNIFICANT CHANGE UP (ref 0–6)
EPI CELLS # UR: SIGNIFICANT CHANGE UP
GLUCOSE SERPL-MCNC: 99 MG/DL — SIGNIFICANT CHANGE UP (ref 70–99)
GLUCOSE UR QL: NEGATIVE MG/DL — SIGNIFICANT CHANGE UP
HCT VFR BLD CALC: 39 % — SIGNIFICANT CHANGE UP (ref 39–50)
HGB BLD-MCNC: 13 G/DL — SIGNIFICANT CHANGE UP (ref 13–17)
IMM GRANULOCYTES NFR BLD AUTO: 0.7 % — SIGNIFICANT CHANGE UP (ref 0–0.9)
KETONES UR-MCNC: NEGATIVE — SIGNIFICANT CHANGE UP
LEUKOCYTE ESTERASE UR-ACNC: NEGATIVE — SIGNIFICANT CHANGE UP
LYMPHOCYTES # BLD AUTO: 0.91 K/UL — LOW (ref 1–3.3)
LYMPHOCYTES # BLD AUTO: 3.9 % — LOW (ref 13–44)
MCHC RBC-ENTMCNC: 28.4 PG — SIGNIFICANT CHANGE UP (ref 27–34)
MCHC RBC-ENTMCNC: 33.3 GM/DL — SIGNIFICANT CHANGE UP (ref 32–36)
MCV RBC AUTO: 85.2 FL — SIGNIFICANT CHANGE UP (ref 80–100)
MONOCYTES # BLD AUTO: 1.66 K/UL — HIGH (ref 0–0.9)
MONOCYTES NFR BLD AUTO: 7.1 % — SIGNIFICANT CHANGE UP (ref 2–14)
NEUTROPHILS # BLD AUTO: 20.45 K/UL — HIGH (ref 1.8–7.4)
NEUTROPHILS NFR BLD AUTO: 87.9 % — HIGH (ref 43–77)
NITRITE UR-MCNC: NEGATIVE — SIGNIFICANT CHANGE UP
PH UR: 5 — SIGNIFICANT CHANGE UP (ref 5–8)
PLATELET # BLD AUTO: 104 K/UL — LOW (ref 150–400)
POTASSIUM SERPL-MCNC: 3.9 MMOL/L — SIGNIFICANT CHANGE UP (ref 3.5–5.3)
POTASSIUM SERPL-SCNC: 3.9 MMOL/L — SIGNIFICANT CHANGE UP (ref 3.5–5.3)
PROT SERPL-MCNC: 6.5 G/DL — LOW (ref 6.6–8.7)
PROT UR-MCNC: 100
RBC # BLD: 4.58 M/UL — SIGNIFICANT CHANGE UP (ref 4.2–5.8)
RBC # FLD: 12.9 % — SIGNIFICANT CHANGE UP (ref 10.3–14.5)
RBC CASTS # UR COMP ASSIST: ABNORMAL /HPF (ref 0–4)
SODIUM SERPL-SCNC: 133 MMOL/L — LOW (ref 135–145)
SP GR SPEC: 1.01 — SIGNIFICANT CHANGE UP (ref 1.01–1.02)
UROBILINOGEN FLD QL: NEGATIVE MG/DL — SIGNIFICANT CHANGE UP
WBC # BLD: 23.28 K/UL — HIGH (ref 3.8–10.5)
WBC # FLD AUTO: 23.28 K/UL — HIGH (ref 3.8–10.5)
WBC UR QL: SIGNIFICANT CHANGE UP /HPF (ref 0–5)

## 2023-09-24 PROCEDURE — 99233 SBSQ HOSP IP/OBS HIGH 50: CPT

## 2023-09-24 RX ORDER — SODIUM BICARBONATE 1 MEQ/ML
0.08 SYRINGE (ML) INTRAVENOUS
Qty: 75 | Refills: 0 | Status: DISCONTINUED | OUTPATIENT
Start: 2023-09-24 | End: 2023-09-26

## 2023-09-24 RX ORDER — SODIUM CHLORIDE 9 MG/ML
1000 INJECTION INTRAMUSCULAR; INTRAVENOUS; SUBCUTANEOUS
Refills: 0 | Status: DISCONTINUED | OUTPATIENT
Start: 2023-09-24 | End: 2023-09-24

## 2023-09-24 RX ADMIN — CARVEDILOL PHOSPHATE 6.25 MILLIGRAM(S): 80 CAPSULE, EXTENDED RELEASE ORAL at 05:02

## 2023-09-24 RX ADMIN — APIXABAN 5 MILLIGRAM(S): 2.5 TABLET, FILM COATED ORAL at 16:28

## 2023-09-24 RX ADMIN — APIXABAN 5 MILLIGRAM(S): 2.5 TABLET, FILM COATED ORAL at 05:01

## 2023-09-24 RX ADMIN — ATORVASTATIN CALCIUM 40 MILLIGRAM(S): 80 TABLET, FILM COATED ORAL at 21:15

## 2023-09-24 RX ADMIN — AMLODIPINE BESYLATE 10 MILLIGRAM(S): 2.5 TABLET ORAL at 05:01

## 2023-09-24 RX ADMIN — LEVETIRACETAM 1000 MILLIGRAM(S): 250 TABLET, FILM COATED ORAL at 16:28

## 2023-09-24 RX ADMIN — CARVEDILOL PHOSPHATE 6.25 MILLIGRAM(S): 80 CAPSULE, EXTENDED RELEASE ORAL at 16:28

## 2023-09-24 RX ADMIN — Medication 83 MEQ/KG/HR: at 22:50

## 2023-09-24 RX ADMIN — TAMSULOSIN HYDROCHLORIDE 0.4 MILLIGRAM(S): 0.4 CAPSULE ORAL at 21:15

## 2023-09-24 RX ADMIN — ONDANSETRON 4 MILLIGRAM(S): 8 TABLET, FILM COATED ORAL at 05:02

## 2023-09-24 RX ADMIN — LEVETIRACETAM 1000 MILLIGRAM(S): 250 TABLET, FILM COATED ORAL at 05:02

## 2023-09-24 NOTE — CONSULT NOTE ADULT - SUBJECTIVE AND OBJECTIVE BOX
Patient confirmed that he is under the care of his outpatient nephrologist Dr Reyes whom he had followed up with ~1 month ago. Dr Rubin's group was notified through answering service to follow up with consult for SANTOS. Primary team made aware.    Stephanie Armijo DO  Neprology

## 2023-09-24 NOTE — CONSULT NOTE ADULT - SUBJECTIVE AND OBJECTIVE BOX
Patient is a 63y old  Male who presents with a chief complaint of Intractable N/V, SANTOS (24 Sep 2023 15:33)      HPI:  Mr. Cummins is a 64 yo M with a PMH significant for HTN, HLD, Type A dissection / repeat sternotomy, and CABG x2, CHF (mixed systolic / diastolic), mitral / aortic valve replacement, CKD (with short term on HD 2022), A.fib s/p ablation pending watchman, SDH (resolved as of July imaging) who presents to [facility] for ~ 2 weeks of inability to tolerate oral intake. Patient states that over the last 2 weeks he has progressively lost the ability to stomach foods, beginning with solids and now also involving liquids when he takes in too much. He states he has been to 2 car shows within the last 2 weeks (both on Sundays) and noticed the symptoms begin the day after the first car show on 9/11. 10-15 minutes after intaking food or large amounts of liquids he will feel the need to vomit and after vomiting he will feel better. The emesis is NBNB only containing what he ate. Due to this he has been unable to tolerate his usual medicines including his blood thinner. He continues to have bowel movements and pass gas, and feels well if he doesn't try to ingest anything. His only remaining symptom was mild SOB on exertion last night and this morning. He denies fevers/chills, dysuria/ anuria, constipation / diarrhea, recent sick contacts or changes to medications.   In ED patients vitals were wnl. Lab work showed hyponatremia of 127, with hypochloremia of 91, bicarb of 14, BUN 71.4, and Cr 3.5 as well as elevated WBC to 23.94 and elevated Trp to .09 with BNP 4805. Imaging with CT Abd/pelvis showed no acute pathology. Given 1L LR in ED, to be continued with NS at 50 cc'hr given likely dehydrated status, Nephrology and Cardiology consulted in ED. Patient to be admitted to medical floors for further monitoring.  (23 Sep 2023 17:52)      Baseline creat 1.6   No hydro on CT   Started on IVF   No contrast   Lowest SBP documented 103   Torsemide PTA   Prior HD due to ATN --> Recovered renal function , off HD 11/21     PAST MEDICAL & SURGICAL HISTORY:  CHF (congestive heart failure)      CVA (cerebral vascular accident)      Seizures  last seizure 10 years ago      HTN (hypertension)      Hyperlipemia      COVID-19  october 2020      Atrial fibrillation      Former smoker      History of cocaine use  remote hx, currently sober      H/O aortic dissection  s/p repair (2010), surgery complicated by bowel ischemia s/p bowel resection and ostomy with reversal      H/O aortic valve insufficiency      Mitral regurgitation      GIB (gastrointestinal bleeding)      CAD (coronary artery disease)  s/p PCI 1998  and CABG x 2 11/2020      Chronic atrial fibrillation      H/O colectomy      Status post double vessel coronary artery bypass  11/2020 Dr Butler      S/P AVR (aortic valve replacement)  (t)AVR 11/2020 Dr Butler      S/P MVR (mitral valve replacement)  (t)MVR 11/2020 Dr Butler      H/O aortic dissection  Type A; emergent repair 2010      AV fistula  LUE          FAMILY HISTORY:  FH: hypertension    Family history of cardiac disorder (Mother)  mother    Home Medications:   * Patient Currently Takes Medications as of 14-Aug-2023 12:01 documented in Structured Notes  · 	Potassium Chloride (Eqv-Klor-Con M10) 10 mEq oral tablet, extended release: 1 tab(s) orally once a day  · 	torsemide 20 mg oral tablet: 1 tab(s) orally every other day  · 	amLODIPine 10 mg oral tablet: 1 tab(s) orally once a day  · 	levETIRAcetam 1000 mg oral tablet: 1 tab(s) orally 2 times a day  · 	tamsulosin 0.4 mg oral capsule: 1 cap(s) orally once a day (at bedtime)  · 	carvedilol 6.25 mg oral tablet: 1 orally 2 times a day  · 	atorvastatin 40 mg oral tablet: 1 orally once a day  · 	Eliquis 5 mg oral tablet: 1 orally once a day  · 	oyster shell 500mg BID:       Social History:    MEDICATIONS  (STANDING):  amLODIPine   Tablet 10 milliGRAM(s) Oral daily  apixaban 5 milliGRAM(s) Oral two times a day  atorvastatin 40 milliGRAM(s) Oral at bedtime  carvedilol 6.25 milliGRAM(s) Oral every 12 hours  levETIRAcetam 1000 milliGRAM(s) Oral two times a day  sodium chloride 0.9%. 1000 milliLiter(s) (100 mL/Hr) IV Continuous <Continuous>  tamsulosin 0.4 milliGRAM(s) Oral at bedtime    MEDICATIONS  (PRN):  acetaminophen     Tablet .. 650 milliGRAM(s) Oral every 6 hours PRN Temp greater or equal to 38C (100.4F), Mild Pain (1 - 3)  aluminum hydroxide/magnesium hydroxide/simethicone Suspension 30 milliLiter(s) Oral every 4 hours PRN Dyspepsia  melatonin 3 milliGRAM(s) Oral at bedtime PRN Insomnia  ondansetron Injectable 4 milliGRAM(s) IV Push every 8 hours PRN Nausea and/or Vomiting      Allergies    penicillin (Other)    Intolerances      ma      Vital Signs Last 24 Hrs  T(C): 36.4 (24 Sep 2023 19:41), Max: 36.7 (23 Sep 2023 21:34)  T(F): 97.5 (24 Sep 2023 19:41), Max: 98.1 (23 Sep 2023 23:01)  HR: 69 (24 Sep 2023 19:41) (61 - 92)  BP: 103/60 (24 Sep 2023 19:41) (103/60 - 138/79)  BP(mean): --  RR: 18 (24 Sep 2023 19:41) (18 - 18)  SpO2: 100% (24 Sep 2023 19:41) (96% - 100%)    Parameters below as of 24 Sep 2023 19:41  Patient On (Oxygen Delivery Method): room air      Daily     Daily   I&O's Detail    23 Sep 2023 07:01  -  24 Sep 2023 07:00  --------------------------------------------------------  IN:    sodium chloride 0.9%: 750 mL  Total IN: 750 mL    OUT:  Total OUT: 0 mL    Total NET: 750 mL      24 Sep 2023 07:01  -  24 Sep 2023 21:09  --------------------------------------------------------  IN:    Oral Fluid: 100 mL    sodium chloride 0.9%: 350 mL    sodium chloride 0.9%: 300 mL  Total IN: 750 mL    OUT:    Voided (mL): 1100 mL  Total OUT: 1100 mL    Total NET: -350 mL        I&O's Summary    23 Sep 2023 07:01  -  24 Sep 2023 07:00  --------------------------------------------------------  IN: 750 mL / OUT: 0 mL / NET: 750 mL    24 Sep 2023 07:01  -  24 Sep 2023 21:09  --------------------------------------------------------  IN: 750 mL / OUT: 1100 mL / NET: -350 mL        PHYSICAL EXAM:    GENERAL: NAD, Laying flat   HEAD:  Atraumatic, Normocephalic, dry MM   NERVOUS SYSTEM:  Alert & Oriented X3,  CHEST/LUNG: Clear / EAE   HEART: Regular rate and rhythm; No gallop or rub   ABDOMEN: Soft, Nontender , no rigidity or rebound   EXTREMITIES:  No edema         LABS:                        13.0   23.28 )-----------( 104      ( 24 Sep 2023 05:46 )             39.0     09-24    133<L>  |  99  |  69.6<H>  ----------------------------<  99  3.9   |  16.0<L>  |  2.89<H>    Ca    7.7<L>      24 Sep 2023 05:46    TPro  6.5<L>  /  Alb  2.5<L>  /  TBili  0.7  /  DBili  x   /  AST  14  /  ALT  21  /  AlkPhos  125<H>  09-24    PT/INR - ( 23 Sep 2023 12:15 )   PT: 16.6 sec;   INR: 1.51 ratio         PTT - ( 23 Sep 2023 12:15 )  PTT:33.3 sec  Urinalysis Basic - ( 24 Sep 2023 05:46 )    Color: x / Appearance: x / SG: x / pH: x  Gluc: 99 mg/dL / Ketone: x  / Bili: x / Urobili: x   Blood: x / Protein: x / Nitrite: x   Leuk Esterase: x / RBC: x / WBC x   Sq Epi: x / Non Sq Epi: x / Bacteria: x          < from: MUSTAPHA Echo Doppler (08.14.23 @ 09:56) >      Summary:   1. Left ventricular ejection fraction, by visual estimation, is 55 to   60%.   2. Normal global left ventricular systolic function.   3. Bioprosthesis is seen in mitral position. MV Mean PG 5.3 mmHg at HR   54 bpm. Moderate paravalvular leak.   4. Bioprosthesis in theaortic position.   5. Color flow doppler and intravenous injection of agitated saline   demonstrates the presence of an intact intra atrial septum.   6. No left atrial appendage thrombus and decreased left atrial appendage   velocities.   7. Appendage measurements on 3D:      Major diameter:21.8 mm Minor diameter: 17.8 mm Maximum depth: 22.9 mm.      Conclusion: Maximum diameter obtained= 21.8 mm. Recommend 27 mm   watchman device.    MD Isak Electronically signed on 8/14/2023 at 3:52:17 PM        *** Final ***    < end of copied text >      RADIOLOGY & ADDITIONAL TESTS:  ACC: [id number] EXAM:  CT ABDOMEN AND PELVIS   ORDERED BY: DIGNA DALY     PROCEDURE DATE:  09/23/2023          INTERPRETATION:  CLINICAL INFORMATION: Postprandial vomiting. History of   small bowel obstruction.    COMPARISON: CT chest abdomen pelvis dated 5/22/2023.    CONTRAST/COMPLICATIONS:  IV Contrast: None  Oral Contrast: None  Complications: None    PROCEDURE:  CT of the Abdomen and Pelvis was performed.  Sagittal and coronal reformats were performed.    FINDINGS:  LOWER CHEST: Aortic and mitral valve replacement. Coronary artery   calcification. Perivascular halo in the right lower lobe.    LIVER: Within normal limits.  BILE DUCTS: Normal caliber.  GALLBLADDER: Small gallstone. No pericholecystic inflammation.  SPLEEN: Within normal limits.  PANCREAS: Within normal limits.  ADRENALS: Within normal limits.  KIDNEYS/URETERS: 2 cm cortical cyst upper pole right kidney. No   hydronephrosis.    BLADDER: Within normal limits.  REPRODUCTIVE ORGANS: Prostate is enlarged.    BOWEL: No bowel obstruction. Small bowel anastomosis. Appendix not   visualized.  PERITONEUM: No ascites.  VESSELS: Redemonstration of aortic dissection. Calcified atheromatous   plaque.  RETROPERITONEUM/LYMPH NODES: No lymphadenopathy.  ABDOMINAL WALL: Within normal limits.  BONES: Within normal limits.    IMPRESSION:  No CT evidence of bowel obstruction or other acute intra-abdominal   process.        --- End of Report ---            ANASTACIA LI MD; Attending Radiologist  This document has been electronically signed. Sep 23 2023  3:15PM

## 2023-09-24 NOTE — PROGRESS NOTE ADULT - ASSESSMENT
Mr. Cummins is a 64 yo M with a PMH significant for HTN, HLD, Type A dissection with bowel ischemia requiring bowel resection / repeat sternotomy, and CABG x2, CHF (mixed systolic / diastolic), mitral / aortic valve replacement, CKD (with short term on HD 2022), A.fib s/p ablation pending watchman, SDH (resolved as of July imaging) who presents to [facility] for ~ 2 weeks of inability to tolerate oral intake. Labs consistent with acute renal failure likely in the setting of GI losses/ Poor PO intake, however patient with known HF (last EF 55-60% on MUSTAPHA), given 1L LR in ED, will continue with gentle fluid resuscitation with NS 50 cc/hr and monitor fluid balance/ Renal function. nephrology / cardiology consulted.     #Acute Renal Failure, associated metabolic acidosis   Likely Pre renal, in setting of GI losses / Poor PO intake   -  continue with NS at 100 cc/hr for 24 hrs  - Cr 3.5, baseline from last DC appears to be ~ 1.5-1.8, however patient did need short term HD in the recent past   - Nephrology consulted     #Intractable Nausea / Vomiting   ~ 2 weeks of progressively worsening inability to tolerate PO beginning with solids, now large volume liquids, no dysphagia, patient tolerating sips of liquid  - CLD started for now, Zofran PRN, will advance diet as tolerated   - Possibly in setting of viral illness with elevated WBC, worsened by uremia? CT A/P shows no acute pathology - Redemonstration on aortic dissection - findings discussed with Radiologist - Dr Piña- appears stable as compared to prior imaging in 5/2023 .       #Heart Failure with Preserved Ejection Fraction   Follows Dr. Frankel   - Continues on Coreg, will hold torsemide in light of likely dehydration / hyponatremia, resume when clinically appropriate   - MUSTAPHA - EF 55-60%  - currently looks hypovolemic clinically  - Cardiology consulted     #Elevated Trp   Likely in setting of renal failure   - .09 -> .08 will obtain 1 further value, if continues to downtrend will stop following   - Patient denies cardiac symptoms at this time     #Leukocytosis   WBC 23.94 on admission, no clear infectious source   - patient afebrile, possibly viral origin vs. concentration, f/u Blood cx, UA, continue to monitor off Abxs at this time     #Hyponatremia   In setting of poor oral intake, Na 127 -improving with ivfluids   monitor     #paroxysmal Atrial fibrillation   - Rate controlled currently, continue BB, monitor on Tele   - Stopped briefly AC due to SDH in recent past, however CT head 7/2023 shows complete resolution of SDH, resumed AC outpatient per Cardiology, planned for Watchman procedure this coming Oct    #history of SDH   As above, CT head showed complete resolution of recent SDH, continues on AC     #Mitral/Aortic valve replacement   -Recent MUSTAPHA shows bioprosthetic valves with only moderate paravalvular leak in MV.     DVT prophylaxis: Eliquis   Diet: CLD will advance as tolerated   Dispo: pending clinical improvement, Nephro / Cardio Recs

## 2023-09-24 NOTE — CONSULT NOTE ADULT - ASSESSMENT
SANTOS on CKD - Baseline 1.6 ---> H/O temporary HD ending in Nov 2021  Presented with GI losses  ? Viral enteritis   Recent ECHO and MUSTAPHA noted   Torsemide held ( h/o HFpEF )   No hydro on CT 9/23   Check CPK   UDS     MA - Add Bicarb to IVF     Microscopic hematuria   No stones or bladder lesions seen on CT   Repeat UA / Check culture   Eventual Urology evaluation

## 2023-09-24 NOTE — PROGRESS NOTE ADULT - SUBJECTIVE AND OBJECTIVE BOX
JONATHAN GIBSON    [id number]    63y      Male    CC: Nausea/ vomiting, SANTOS     H&P reviewed and confirmed with patient. says he has been doing well however started throwing up after he went to a car show and was around a lot of people.   patient does not recollect why he had abdominal surgery 1yr ago when asked about the large abdominal wall scar on exam.     INTERVAL HPI/OVERNIGHT EVENTS: no acute events     REVIEW OF SYSTEMS:    CONSTITUTIONAL: No fever, weight loss, or fatigue  RESPIRATORY: No cough, wheezing,No shortness of breath  CARDIOVASCULAR: No chest pain, palpitations  GASTROINTESTINAL: No abdominal or epigastric pain.        Vital Signs Last 24 Hrs  T(C): 36.5 (24 Sep 2023 11:05), Max: 36.7 (23 Sep 2023 19:47)  T(F): 97.7 (24 Sep 2023 11:05), Max: 98.1 (23 Sep 2023 19:47)  HR: 61 (24 Sep 2023 11:05) (61 - 92)  BP: 103/65 (24 Sep 2023 11:05) (103/65 - 138/79)  BP(mean): --  RR: 18 (24 Sep 2023 11:05) (18 - 19)  SpO2: 96% (24 Sep 2023 11:05) (96% - 98%)    Parameters below as of 24 Sep 2023 11:05  Patient On (Oxygen Delivery Method): room air        PHYSICAL EXAM:    GENERAL: NAD, well-groomed  HEENT: PERRL, +EOMI  NECK: soft, Supple, No JVD,   CHEST/LUNG: Clear to percussion bilaterally; No wheezing  HEART: S1S2+, Regular rate and rhythm; No murmurs  ABDOMEN: Soft, Nontender, Nondistended; Bowel sounds present. large midline healed scar uneven on anterior abdominal wall.   EXTREMITIES:   No clubbing, cyanosis, or edema  SKIN: No rashes or lesions  NEURO: AAOX3, no focal deficits  PSYCH: normal mood      09-23 @ 07:01  -  09-24 @ 07:00  --------------------------------------------------------  IN: 750 mL / OUT: 0 mL / NET: 750 mL    09-24 @ 07:01  -  09-24 @ 15:41  --------------------------------------------------------  IN: 350 mL / OUT: 300 mL / NET: 50 mL        LABS:                        13.0   23.28 )-----------( 104      ( 24 Sep 2023 05:46 )             39.0     09-24    133<L>  |  99  |  69.6<H>  ----------------------------<  99  3.9   |  16.0<L>  |  2.89<H>    Ca    7.7<L>      24 Sep 2023 05:46    TPro  6.5<L>  /  Alb  2.5<L>  /  TBili  0.7  /  DBili  x   /  AST  14  /  ALT  21  /  AlkPhos  125<H>  09-24    PT/INR - ( 23 Sep 2023 12:15 )   PT: 16.6 sec;   INR: 1.51 ratio         PTT - ( 23 Sep 2023 12:15 )  PTT:33.3 sec  Urinalysis Basic - ( 24 Sep 2023 05:46 )    Color: x / Appearance: x / SG: x / pH: x  Gluc: 99 mg/dL / Ketone: x  / Bili: x / Urobili: x   Blood: x / Protein: x / Nitrite: x   Leuk Esterase: x / RBC: x / WBC x   Sq Epi: x / Non Sq Epi: x / Bacteria: x          MEDICATIONS  (STANDING):  amLODIPine   Tablet 10 milliGRAM(s) Oral daily  apixaban 5 milliGRAM(s) Oral two times a day  atorvastatin 40 milliGRAM(s) Oral at bedtime  carvedilol 6.25 milliGRAM(s) Oral every 12 hours  levETIRAcetam 1000 milliGRAM(s) Oral two times a day  sodium chloride 0.9%. 1000 milliLiter(s) (50 mL/Hr) IV Continuous <Continuous>  tamsulosin 0.4 milliGRAM(s) Oral at bedtime    MEDICATIONS  (PRN):  acetaminophen     Tablet .. 650 milliGRAM(s) Oral every 6 hours PRN Temp greater or equal to 38C (100.4F), Mild Pain (1 - 3)  aluminum hydroxide/magnesium hydroxide/simethicone Suspension 30 milliLiter(s) Oral every 4 hours PRN Dyspepsia  melatonin 3 milliGRAM(s) Oral at bedtime PRN Insomnia  ondansetron Injectable 4 milliGRAM(s) IV Push every 8 hours PRN Nausea and/or Vomiting      RADIOLOGY & ADDITIONAL TESTS:    < from: CT Abdomen and Pelvis No Cont (09.23.23 @ 14:55) >  IMPRESSION:  No CT evidence of bowel obstruction or other acute intra-abdominal   process.    < end of copied text >    - MUSTAPHA 8/14/23 showed EF 55-60%

## 2023-09-25 LAB
-  STAPHYLOCOCCUS EPIDERMIDIS: SIGNIFICANT CHANGE UP
AMPHET UR-MCNC: NEGATIVE — SIGNIFICANT CHANGE UP
ANION GAP SERPL CALC-SCNC: 18 MMOL/L — HIGH (ref 5–17)
APPEARANCE UR: CLEAR — SIGNIFICANT CHANGE UP
BACTERIA # UR AUTO: ABNORMAL
BARBITURATES UR SCN-MCNC: NEGATIVE — SIGNIFICANT CHANGE UP
BASOPHILS # BLD AUTO: 0 K/UL — SIGNIFICANT CHANGE UP (ref 0–0.2)
BASOPHILS NFR BLD AUTO: 0 % — SIGNIFICANT CHANGE UP (ref 0–2)
BENZODIAZ UR-MCNC: NEGATIVE — SIGNIFICANT CHANGE UP
BILIRUB UR-MCNC: NEGATIVE — SIGNIFICANT CHANGE UP
BUN SERPL-MCNC: 71.1 MG/DL — HIGH (ref 8–20)
BURR CELLS BLD QL SMEAR: PRESENT — SIGNIFICANT CHANGE UP
CALCIUM SERPL-MCNC: 8 MG/DL — LOW (ref 8.4–10.5)
CHLORIDE SERPL-SCNC: 96 MMOL/L — SIGNIFICANT CHANGE UP (ref 96–108)
CK SERPL-CCNC: 67 U/L — SIGNIFICANT CHANGE UP (ref 30–200)
CO2 SERPL-SCNC: 17 MMOL/L — LOW (ref 22–29)
COCAINE METAB.OTHER UR-MCNC: NEGATIVE — SIGNIFICANT CHANGE UP
COLOR SPEC: YELLOW — SIGNIFICANT CHANGE UP
CREAT SERPL-MCNC: 2.79 MG/DL — HIGH (ref 0.5–1.3)
DIFF PNL FLD: ABNORMAL
EGFR: 25 ML/MIN/1.73M2 — LOW
EOSINOPHIL # BLD AUTO: 0 K/UL — SIGNIFICANT CHANGE UP (ref 0–0.5)
EOSINOPHIL NFR BLD AUTO: 0 % — SIGNIFICANT CHANGE UP (ref 0–6)
EPI CELLS # UR: SIGNIFICANT CHANGE UP
GI PCR PANEL: SIGNIFICANT CHANGE UP
GLUCOSE SERPL-MCNC: 99 MG/DL — SIGNIFICANT CHANGE UP (ref 70–99)
GLUCOSE UR QL: NEGATIVE MG/DL — SIGNIFICANT CHANGE UP
GRAM STN FLD: SIGNIFICANT CHANGE UP
HCT VFR BLD CALC: 37.3 % — LOW (ref 39–50)
HGB BLD-MCNC: 12.4 G/DL — LOW (ref 13–17)
KETONES UR-MCNC: NEGATIVE — SIGNIFICANT CHANGE UP
LACTATE BLDV-MCNC: 1.3 MMOL/L — SIGNIFICANT CHANGE UP (ref 0.5–2)
LEUKOCYTE ESTERASE UR-ACNC: NEGATIVE — SIGNIFICANT CHANGE UP
LYMPHOCYTES # BLD AUTO: 0.31 K/UL — LOW (ref 1–3.3)
LYMPHOCYTES # BLD AUTO: 1.7 % — LOW (ref 13–44)
MANUAL SMEAR VERIFICATION: SIGNIFICANT CHANGE UP
MCHC RBC-ENTMCNC: 28.2 PG — SIGNIFICANT CHANGE UP (ref 27–34)
MCHC RBC-ENTMCNC: 33.2 GM/DL — SIGNIFICANT CHANGE UP (ref 32–36)
MCV RBC AUTO: 84.8 FL — SIGNIFICANT CHANGE UP (ref 80–100)
METHADONE UR-MCNC: NEGATIVE — SIGNIFICANT CHANGE UP
METHOD TYPE: SIGNIFICANT CHANGE UP
MONOCYTES # BLD AUTO: 0.96 K/UL — HIGH (ref 0–0.9)
MONOCYTES NFR BLD AUTO: 5.2 % — SIGNIFICANT CHANGE UP (ref 2–14)
NEUTROPHILS # BLD AUTO: 17.24 K/UL — HIGH (ref 1.8–7.4)
NEUTROPHILS NFR BLD AUTO: 93.1 % — HIGH (ref 43–77)
NITRITE UR-MCNC: NEGATIVE — SIGNIFICANT CHANGE UP
OPIATES UR-MCNC: NEGATIVE — SIGNIFICANT CHANGE UP
OVALOCYTES BLD QL SMEAR: SIGNIFICANT CHANGE UP
PCP SPEC-MCNC: SIGNIFICANT CHANGE UP
PCP UR-MCNC: NEGATIVE — SIGNIFICANT CHANGE UP
PH UR: 5 — SIGNIFICANT CHANGE UP (ref 5–8)
PLAT MORPH BLD: ABNORMAL
PLATELET # BLD AUTO: 99 K/UL — LOW (ref 150–400)
POIKILOCYTOSIS BLD QL AUTO: SIGNIFICANT CHANGE UP
POLYCHROMASIA BLD QL SMEAR: SLIGHT — SIGNIFICANT CHANGE UP
POTASSIUM SERPL-MCNC: 3.8 MMOL/L — SIGNIFICANT CHANGE UP (ref 3.5–5.3)
POTASSIUM SERPL-SCNC: 3.8 MMOL/L — SIGNIFICANT CHANGE UP (ref 3.5–5.3)
PROCALCITONIN SERPL-MCNC: 1.75 NG/ML — HIGH (ref 0.02–0.1)
PROT UR-MCNC: 100
RAPID RVP RESULT: SIGNIFICANT CHANGE UP
RBC # BLD: 4.4 M/UL — SIGNIFICANT CHANGE UP (ref 4.2–5.8)
RBC # FLD: 12.9 % — SIGNIFICANT CHANGE UP (ref 10.3–14.5)
RBC BLD AUTO: ABNORMAL
RBC CASTS # UR COMP ASSIST: ABNORMAL /HPF (ref 0–4)
SARS-COV-2 RNA SPEC QL NAA+PROBE: SIGNIFICANT CHANGE UP
SCHISTOCYTES BLD QL AUTO: SLIGHT — SIGNIFICANT CHANGE UP
SODIUM SERPL-SCNC: 131 MMOL/L — LOW (ref 135–145)
SP GR SPEC: 1.02 — SIGNIFICANT CHANGE UP (ref 1.01–1.02)
THC UR QL: NEGATIVE — SIGNIFICANT CHANGE UP
UROBILINOGEN FLD QL: NEGATIVE MG/DL — SIGNIFICANT CHANGE UP
WBC # BLD: 18.52 K/UL — HIGH (ref 3.8–10.5)
WBC # FLD AUTO: 18.52 K/UL — HIGH (ref 3.8–10.5)
WBC UR QL: SIGNIFICANT CHANGE UP /HPF (ref 0–5)

## 2023-09-25 PROCEDURE — 99223 1ST HOSP IP/OBS HIGH 75: CPT

## 2023-09-25 PROCEDURE — 93990 DOPPLER FLOW TESTING: CPT | Mod: 26

## 2023-09-25 PROCEDURE — 71045 X-RAY EXAM CHEST 1 VIEW: CPT | Mod: 26

## 2023-09-25 PROCEDURE — 99233 SBSQ HOSP IP/OBS HIGH 50: CPT

## 2023-09-25 RX ORDER — ACETAMINOPHEN 500 MG
1000 TABLET ORAL ONCE
Refills: 0 | Status: COMPLETED | OUTPATIENT
Start: 2023-09-25 | End: 2023-09-25

## 2023-09-25 RX ORDER — VANCOMYCIN HCL 1 G
1250 VIAL (EA) INTRAVENOUS ONCE
Refills: 0 | Status: COMPLETED | OUTPATIENT
Start: 2023-09-25 | End: 2023-09-25

## 2023-09-25 RX ORDER — MEROPENEM 1 G/30ML
1000 INJECTION INTRAVENOUS EVERY 12 HOURS
Refills: 0 | Status: DISCONTINUED | OUTPATIENT
Start: 2023-09-25 | End: 2023-09-26

## 2023-09-25 RX ORDER — MEROPENEM 1 G/30ML
1000 INJECTION INTRAVENOUS ONCE
Refills: 0 | Status: COMPLETED | OUTPATIENT
Start: 2023-09-25 | End: 2023-09-25

## 2023-09-25 RX ORDER — MEROPENEM 1 G/30ML
1 INJECTION INTRAVENOUS EVERY 8 HOURS
Refills: 0 | Status: DISCONTINUED | OUTPATIENT
Start: 2023-09-25 | End: 2023-09-25

## 2023-09-25 RX ORDER — MEROPENEM 1 G/30ML
INJECTION INTRAVENOUS
Refills: 0 | Status: DISCONTINUED | OUTPATIENT
Start: 2023-09-25 | End: 2023-09-26

## 2023-09-25 RX ADMIN — ATORVASTATIN CALCIUM 40 MILLIGRAM(S): 80 TABLET, FILM COATED ORAL at 21:40

## 2023-09-25 RX ADMIN — Medication 400 MILLIGRAM(S): at 17:25

## 2023-09-25 RX ADMIN — Medication 400 MILLIGRAM(S): at 05:16

## 2023-09-25 RX ADMIN — APIXABAN 5 MILLIGRAM(S): 2.5 TABLET, FILM COATED ORAL at 05:19

## 2023-09-25 RX ADMIN — Medication 1000 MILLIGRAM(S): at 05:46

## 2023-09-25 RX ADMIN — MEROPENEM 1000 MILLIGRAM(S): 1 INJECTION INTRAVENOUS at 21:40

## 2023-09-25 RX ADMIN — Medication 166.67 MILLIGRAM(S): at 06:55

## 2023-09-25 RX ADMIN — LEVETIRACETAM 1000 MILLIGRAM(S): 250 TABLET, FILM COATED ORAL at 05:17

## 2023-09-25 RX ADMIN — LEVETIRACETAM 1000 MILLIGRAM(S): 250 TABLET, FILM COATED ORAL at 18:38

## 2023-09-25 RX ADMIN — Medication 83 MEQ/KG/HR: at 11:19

## 2023-09-25 RX ADMIN — Medication 83 MEQ/KG/HR: at 18:48

## 2023-09-25 RX ADMIN — TAMSULOSIN HYDROCHLORIDE 0.4 MILLIGRAM(S): 0.4 CAPSULE ORAL at 21:40

## 2023-09-25 RX ADMIN — AMLODIPINE BESYLATE 10 MILLIGRAM(S): 2.5 TABLET ORAL at 05:19

## 2023-09-25 RX ADMIN — CARVEDILOL PHOSPHATE 6.25 MILLIGRAM(S): 80 CAPSULE, EXTENDED RELEASE ORAL at 05:19

## 2023-09-25 RX ADMIN — APIXABAN 5 MILLIGRAM(S): 2.5 TABLET, FILM COATED ORAL at 18:38

## 2023-09-25 RX ADMIN — MEROPENEM 1000 MILLIGRAM(S): 1 INJECTION INTRAVENOUS at 08:53

## 2023-09-25 NOTE — CONSULT NOTE ADULT - SUBJECTIVE AND OBJECTIVE BOX
[facility]  INFECTIOUS DISEASES at [place] / [place] / [place]  =======================================================                               Roosevelt Victor MD#   Fabián Quigley MD*                             Valentine Handley MD*   Reba Catalan MD*                              Professor Emeritus:  Dr Johnny Hathaway MD^            Diplomates American Board of Internal Medicine & Infectious Diseases                # [place] Office - Appt - Tel  [phone] Fax [phone]                * [place] Office - Appt - Tel [phone] Fax [phone]               ^ [place] Office - Appt - Tel  [phone] Fax [phone]                                  Hospital Consult line:  [phone]  =======================================================      [id number]  JONATHAN MONAHANLLO    CC: Patient is a 63y old  Male who presents with a chief complaint of Acute renal failure   (25 Sep 2023 10:45)      63y  Male with h/o HTN, HLD, Type A dissection with bowel ischemia requiring bowel resection / repeat sternotomy, and CABG x2, CHF (mixed systolic / diastolic), mitral / aortic valve replacement, CKD (with short term on HD 2022), A.fib s/p ablation pending watchman, SDH (resolved as of July imaging). Patient presented to [facility] for 2 weeks of inability to tolerate oral intake. Denies any fever or chills. Has chronic diarrhea due to bowel resection. In the ER patient was afebrile. Found to have leukocytosis to 23k. Patient was found to have SANTOS. Had a febrile episode to 102F on 9/25. Started on Vancomycin and Meropenem. Blood cultures 1 of 4 bottles with GPC. ID input requested.       Past Medical & Surgical Hx:  CHF (congestive heart failure)  CVA (cerebral vascular accident)  Seizures last seizure 10 years ago  HTN (hypertension)  Hyperlipemia  COVID-19 october 2020  Atrial fibrillation  Former smoker  History of cocaine use  remote hx, currently sober  H/O aortic dissection  s/p repair (2010), surgery complicated by bowel ischemia s/p bowel resection and ostomy with reversal  H/O aortic valve insufficiency  Mitral regurgitation  GIB (gastrointestinal bleeding)  CAD (coronary artery disease)  s/p PCI 1998  and CABG x 2 11/2020  Chronic atrial fibrillation  H/O colectomy  Status post double vessel coronary artery bypass  11/2020 Dr Butler  S/P AVR (aortic valve replacement)  (t)AVR 11/2020 Dr Butler  S/P MVR (mitral valve replacement)  (t)MVR 11/2020 Dr Butler  H/O aortic dissection  Type A; emergent repair 2010  AV fistula LUE      Social Hx:  Prior cocaine use. Former smoker       FAMILY HISTORY:  FH: hypertension  Family history of cardiac disorder (Mother)      Allergies  penicillin (Other)       REVIEW OF SYSTEMS:  CONSTITUTIONAL:  No Fever or chills  HEENT:  No diplopia or blurred vision.  No earache, sore throat or runny nose.  CARDIOVASCULAR:  No chest pain  RESPIRATORY:  No cough, shortness of breath  GASTROINTESTINAL:  No nausea, vomiting or diarrhea.  GENITOURINARY:  No dysuria, frequency or urgency. No Blood in urine  MUSCULOSKELETAL:  no joint aches, no muscle pain  SKIN:  No change in skin, hair or nails.  NEUROLOGIC:  No Headaches        Physical Exam:  GEN: NAD  HEENT: normocephalic and atraumatic. EOMI. PERRL.    NECK: Supple.   LUNGS: CTA B/L.  HEART: RRR  ABDOMEN: Soft, NT, ND.  +BS.    : No CVA tenderness  EXTREMITIES: Without  edema.  MSK: No joint swelling  NEUROLOGIC: No Focal Deficits       Vitals:  T(F): 97.4 (25 Sep 2023 12:24), Max: 102.2 (25 Sep 2023 05:05)  HR: 57 (25 Sep 2023 12:24)  BP: 96/60 (25 Sep 2023 12:24)  RR: 18 (25 Sep 2023 12:24)  SpO2: 95% (25 Sep 2023 12:24) (95% - 100%)  temp max in last 48H T(F): , Max: 102.2 (09-25-23 @ 05:05)      Current Antibiotics:  meropenem Injectable      meropenem Injectable 1000 milliGRAM(s) IV Push every 12 hours    Other medications:  amLODIPine   Tablet 10 milliGRAM(s) Oral daily  apixaban 5 milliGRAM(s) Oral two times a day  atorvastatin 40 milliGRAM(s) Oral at bedtime  carvedilol 6.25 milliGRAM(s) Oral every 12 hours  levETIRAcetam 1000 milliGRAM(s) Oral two times a day  sodium bicarbonate  Infusion 0.081 mEq/kG/Hr IV Continuous <Continuous>  tamsulosin 0.4 milliGRAM(s) Oral at bedtime                 12.4   18.52 )-----------( 99       ( 25 Sep 2023 05:50 )             37.3     09-25    131<L>  |  96  |  71.1<H>  ----------------------------<  99  3.8   |  17.0<L>  |  2.79<H>    Ca    8.0<L>      25 Sep 2023 05:50    TPro  6.5<L>  /  Alb  2.5<L>  /  TBili  0.7  /  DBili  x   /  AST  14  /  ALT  21  /  AlkPhos  125<H>  09-24      RECENT CULTURES:  09-25 @ 06:00    [facility]    09-23 @ 19:30 .Blood Blood-Peripheral     No growth at 24 hours    09-23 @ 19:25 .Blood Blood-Peripheral Blood Culture PCR    Growth in aerobic bottle: Gram Positive Cocci in Clusters  Direct identification is available within approximately 3-5  hours either by Blood Panel Multiplexed PCR or Direct  MALDI-TOF. Details: [email or web address]  Growth in aerobic bottle: Gram Positive Cocci in Clusters      WBC Count: 18.52 K/uL (09-25-23 @ 05:50)  WBC Count: 23.28 K/uL (09-24-23 @ 05:46)  WBC Count: 23.94 K/uL (09-23-23 @ 12:15)    Creatinine: 2.79 mg/dL (09-25-23 @ 05:50)  Creatinine: 2.89 mg/dL (09-24-23 @ 05:46)  Creatinine: 3.50 mg/dL (09-23-23 @ 12:15)    Procalcitonin, Serum: 1.75 ng/mL (09-25-23 @ 05:50)     SARS-CoV-2: NotDetec (09-25-23 @ 06:00)    Urinalysis (09.25.23 @ 01:00)    Blood, Urine: Large   Glucose Qualitative, Urine: Negative mg/dL   pH Urine: 5.0   Color: Yellow   Urine Appearance: Clear   Bilirubin: Negative   Ketone - Urine: Negative   Specific Gravity: 1.020   Protein, Urine: 100   Urobilinogen: Negative mg/dL   Nitrite: Negative   Leukocyte Esterase Concentration: Negative  Urine Microscopic-Add On (NC) (09.25.23 @ 01:00)    Red Blood Cell - Urine: 6-10 /HPF   White Blood Cell - Urine: 0-2 /HPF   Bacteria: Occasional   Squamous Epithelial Cells: Occasional      < from: CT Abdomen and Pelvis No Cont (09.23.23 @ 14:55) >  ACC: [id number] EXAM:  CT ABDOMEN AND PELVIS   ORDERED BY: DIGNA DALY     PROCEDURE DATE:  09/23/2023      INTERPRETATION:  CLINICAL INFORMATION: Postprandial vomiting. History of   small bowel obstruction.    COMPARISON: CT chest abdomen pelvis dated 5/22/2023.    CONTRAST/COMPLICATIONS:  IV Contrast: None  Oral Contrast: None  Complications: None    PROCEDURE:  CT of the Abdomen and Pelvis was performed.  Sagittal and coronal reformats were performed.    FINDINGS:  LOWER CHEST: Aortic and mitral valve replacement. Coronary artery   calcification. Perivascular halo in the right lower lobe.    LIVER: Within normal limits.  BILE DUCTS: Normal caliber.  GALLBLADDER: Small gallstone. No pericholecystic inflammation.  SPLEEN: Within normallimits.  PANCREAS: Within normal limits.  ADRENALS: Within normal limits.  KIDNEYS/URETERS: 2 cm cortical cyst upper pole right kidney. No   hydronephrosis.    BLADDER: Within normal limits.  REPRODUCTIVE ORGANS: Prostate is enlarged.    BOWEL: No bowel obstruction. Small bowel anastomosis. Appendix not   visualized.  PERITONEUM: No ascites.  VESSELS: Redemonstration of aortic dissection. Calcified atheromatous   plaque.  RETROPERITONEUM/LYMPH NODES: No lymphadenopathy.  ABDOMINAL WALL: Within normal limits.  BONES: Within normal limits.    IMPRESSION:  No CT evidence of bowel obstruction or other acute intra-abdominal   process.    --- End of Report ---    < end of copied text >      < from: Xray Chest 1 View- PORTABLE-Urgent (Xray Chest 1 View- PORTABLE-Urgent .) (09.23.23 @ 11:37) >  ACC: [id number] EXAM:  XR CHEST PORTABLE URGENT 1V   ORDERED BY: DIGNA DALY     PROCEDURE DATE:  09/23/2023      INTERPRETATION:  AP chest on September 23, 2023 11:12 AM. Patient has   atrial fibrillation and is short of breath.    Heart magnified by technique.    Sternotomy and prosthetic heart valves again noted.    Endotracheal tube and nasogastric tube seen on May 23 of no longer   evident.    There is a persistent small linear density in the retrocardiac area   similar to prior.    Clips overlying right scapula again seen.    IMPRESSION: Persistent small linear density in the retrocardiac region.   Stable cardiac findings.    --- End of Report ---    < end of copied text >

## 2023-09-25 NOTE — DIETITIAN INITIAL EVALUATION ADULT - PERTINENT MEDS FT
MEDICATIONS  (STANDING):  amLODIPine   Tablet 10 milliGRAM(s) Oral daily  apixaban 5 milliGRAM(s) Oral two times a day  atorvastatin 40 milliGRAM(s) Oral at bedtime  carvedilol 6.25 milliGRAM(s) Oral every 12 hours  levETIRAcetam 1000 milliGRAM(s) Oral two times a day  meropenem Injectable      meropenem Injectable 1000 milliGRAM(s) IV Push every 12 hours  sodium bicarbonate  Infusion 0.081 mEq/kG/Hr (83 mL/Hr) IV Continuous <Continuous>  tamsulosin 0.4 milliGRAM(s) Oral at bedtime    MEDICATIONS  (PRN):  acetaminophen     Tablet .. 650 milliGRAM(s) Oral every 6 hours PRN Temp greater or equal to 38C (100.4F), Mild Pain (1 - 3)  aluminum hydroxide/magnesium hydroxide/simethicone Suspension 30 milliLiter(s) Oral every 4 hours PRN Dyspepsia  melatonin 3 milliGRAM(s) Oral at bedtime PRN Insomnia  ondansetron Injectable 4 milliGRAM(s) IV Push every 8 hours PRN Nausea and/or Vomiting

## 2023-09-25 NOTE — DIETITIAN INITIAL EVALUATION ADULT - OTHER INFO
64 yo M with a PMH significant for HTN, HLD, Type A dissection with bowel ischemia requiring bowel resection / repeat sternotomy, and CABG x2, CHF (mixed systolic / diastolic), mitral / aortic valve replacement, CKD (with short term on HD 2022), A.fib s/p ablation pending watchman, SDH (resolved as of July imaging) who presents to [facility] for ~ 2 weeks of inability to tolerate oral intake. Labs consistent with acute renal failure likely in the setting of GI losses/ Poor PO intake, however patient with known HF (last EF 55-60% on MUSTAPHA), given 1L LR in ED, will continue with gentle fluid resuscitation with NS 50 cc/hr and monitor fluid balance/ Renal function. nephrology / cardiology consulted.

## 2023-09-25 NOTE — CONSULT NOTE ADULT - ASSESSMENT
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 Patient presented to [facility] for 2 weeks of inability to tolerate oral intake. Denies any fever or chills. Has chronic diarrhea due to bowel resection. In the ER patient was afebrile. Found to have leukocytosis to 23k. Patient was found to have SANTOS. Had a febrile episode to 102F on 9/25. Started on Vancomycin and Meropenem. Blood cultures 1 of 4 bottles with GPC.      Positive blood culture 1 of 4 bottles  Fever  Leukocytosis   SANTOS       - Blood cultures 9/23 1 of 4 bottles with CoNS  - Repeat blood cultures 9/25 pending  - RVP/COVID 19 PCR 9/25 negative   - CT A/P 9/23 reporting no acute findings   - CXR 9/23 no PNA  - Check TTE   - UA 9/25 negative for UTI   - Procalcitonin level not reliable in SANTOS   - Continue Meropenem  - Continue Vancomycin  - Monitor trough, will order next trough which will be a random level   - Monitor for Vancomycin toxicity   - Vancomycin required at this time pending culture results  - Monitor Cr while on Vancomycin  - Follow up cultures  - Trend Fever  - Trend WBC      Will Follow    Discussed treatment plan with: medical team

## 2023-09-25 NOTE — DIETITIAN INITIAL EVALUATION ADULT - ADD RECOMMEND
Advance diet as per MD  Fluid restriction as needed  Monitor renal labs, weights, labs  Provide HBV protein sources

## 2023-09-25 NOTE — DIETITIAN INITIAL EVALUATION ADULT - PERTINENT LABORATORY DATA
09-25    131<L>  |  96  |  71.1<H>  ----------------------------<  99  3.8   |  17.0<L>  |  2.79<H>    Ca    8.0<L>      25 Sep 2023 05:50    TPro  6.5<L>  /  Alb  2.5<L>  /  TBili  0.7  /  DBili  x   /  AST  14  /  ALT  21  /  AlkPhos  125<H>  09-24  A1C with Estimated Average Glucose Result: 5.2 % (05-27-23 @ 04:55)

## 2023-09-25 NOTE — PROGRESS NOTE ADULT - ASSESSMENT
Improved SANTOS on CKD - Baseline 1.6 ---> H/O temporary HD ending in Nov 2021 --> was on HD x 1 year   Presented with GI losses  ? Viral enteritis   Recent ECHO and MUSTAPHA noted   Torsemide held ( h/o HFpEF )   No hydro on CT 9/23   CPK 67   UDS negative   Check doppler of L arm to confirm thrombosis     MA - Added Bicarb to IVF     Microscopic hematuria , seen on UA x 2   No stones or bladder lesions seen on CT   Repeat UA / Check culture   Eventual Urology evaluation

## 2023-09-25 NOTE — DIETITIAN NUTRITION RISK NOTIFICATION - UPON NUTRITIONAL ASSESSMENT BY THE REGISTERED DIETITIAN YOUR PATIENT WAS DETERMINED TO MEET CRITERIA/HAS EVIDENCE OF THE FOLLOWING DIAGNOSIS:
Moderate protein-calorie malnutrition [Alert] : alert [Well Nourished] : well nourished [No Discharge] : no discharge [Conjunctival Erythema] : no conjunctival erythema [Normal TMs] : both tympanic membranes were normal [Normal Nasal Mucosa] : the nasal mucosa was normal [No Thrush] : no thrush [No Neck Mass] : no neck mass was observed [Normal Rate and Effort] : normal respiratory rhythm and effort [Wheezing] : no wheezing was heard [Normal Rate] : heart rate was normal  [Regular Rhythm] : with a regular rhythm [Normal Cervical Lymph Nodes] : cervical [Xerosis] : xerosis [de-identified] : No hives. Vitiligo of formarms.  Signifidant xerosis of skin

## 2023-09-25 NOTE — PROGRESS NOTE ADULT - SUBJECTIVE AND OBJECTIVE BOX
NEPHROLOGY INTERVAL HPI/OVERNIGHT EVENTS:    No new complaints   Remains on IV Bicarb infusion   Blood Cx (+) ---> ID follow up and Abx adjustments noted   Currently on Meropenem and Vanco ( as per levels )     MEDICATIONS  (STANDING):  amLODIPine   Tablet 10 milliGRAM(s) Oral daily  apixaban 5 milliGRAM(s) Oral two times a day  atorvastatin 40 milliGRAM(s) Oral at bedtime  carvedilol 6.25 milliGRAM(s) Oral every 12 hours  levETIRAcetam 1000 milliGRAM(s) Oral two times a day  meropenem Injectable      meropenem Injectable 1000 milliGRAM(s) IV Push every 12 hours  sodium bicarbonate  Infusion 0.081 mEq/kG/Hr (83 mL/Hr) IV Continuous <Continuous>  tamsulosin 0.4 milliGRAM(s) Oral at bedtime    MEDICATIONS  (PRN):  acetaminophen     Tablet .. 650 milliGRAM(s) Oral every 6 hours PRN Temp greater or equal to 38C (100.4F), Mild Pain (1 - 3)  aluminum hydroxide/magnesium hydroxide/simethicone Suspension 30 milliLiter(s) Oral every 4 hours PRN Dyspepsia  melatonin 3 milliGRAM(s) Oral at bedtime PRN Insomnia  ondansetron Injectable 4 milliGRAM(s) IV Push every 8 hours PRN Nausea and/or Vomiting      Allergies    penicillin (Other)    Intolerances          Vital Signs Last 24 Hrs  T(C): 36.3 (25 Sep 2023 12:24), Max: 39 (25 Sep 2023 05:05)  T(F): 97.4 (25 Sep 2023 12:24), Max: 102.2 (25 Sep 2023 05:05)  HR: 57 (25 Sep 2023 12:24) (57 - 77)  BP: 96/60 (25 Sep 2023 12:24) (96/60 - 126/73)  BP(mean): 6 (25 Sep 2023 12:24) (6 - 6)  RR: 18 (25 Sep 2023 12:24) (18 - 18)  SpO2: 95% (25 Sep 2023 12:24) (95% - 100%)    Parameters below as of 25 Sep 2023 05:05  Patient On (Oxygen Delivery Method): room air      Daily     Daily   I&O's Detail    24 Sep 2023 07:01  -  25 Sep 2023 07:00  --------------------------------------------------------  IN:    Oral Fluid: 400 mL    Sodium Bicarbonate: 664 mL    sodium chloride 0.9%: 350 mL    sodium chloride 0.9%: 300 mL  Total IN: 1714 mL    OUT:    Voided (mL): 1100 mL  Total OUT: 1100 mL    Total NET: 614 mL        I&O's Summary    24 Sep 2023 07:01  -  25 Sep 2023 07:00  --------------------------------------------------------  IN: 1714 mL / OUT: 1100 mL / NET: 614 mL        PHYSICAL EXAM:      GENERAL: NAD, Laying flat   HEAD:  Atraumatic, Normocephalic, dry MM   NERVOUS SYSTEM:  Alert & Oriented X3,  CHEST/LUNG: Clear / EAE   HEART: Regular rate and rhythm; No gallop or rub   ABDOMEN: Soft, Nontender , no rigidity or rebound   EXTREMITIES:  No edema , thrombosed AVG in L arm ? no evidence of infection       LABS:                        12.4   18.52 )-----------( 99       ( 25 Sep 2023 05:50 )             37.3     09-25    131<L>  |  96  |  71.1<H>  ----------------------------<  99  3.8   |  17.0<L>  |  2.79<H>    Ca    8.0<L>      25 Sep 2023 05:50    TPro  6.5<L>  /  Alb  2.5<L>  /  TBili  0.7  /  DBili  x   /  AST  14  /  ALT  21  /  AlkPhos  125<H>  09-24      Urinalysis Basic - ( 25 Sep 2023 05:50 )    Color: x / Appearance: x / SG: x / pH: x  Gluc: 99 mg/dL / Ketone: x  / Bili: x / Urobili: x   Blood: x / Protein: x / Nitrite: x   Leuk Esterase: x / RBC: x / WBC x   Sq Epi: x / Non Sq Epi: x / Bacteria: x              RADIOLOGY & ADDITIONAL TESTS:

## 2023-09-25 NOTE — CHART NOTE - NSCHARTNOTEFT_GEN_A_CORE
Called by RN to eval pt. with c/o feeling cold and having chills. Pt. seen and states this is how he felt this morning when he had a fever of 102.2. Pt. also with + leukocytosis and + blood cx with GPC. Started on vanco and meropenem. CXR this am = no acute cardiopulmonary disease noted. Pt. denies any CP, SOB, pain anywhere in his body or any other c/o @ present. Pt. was seen by ID today.     Pt. is a 63y  Male with h/o HTN, HLD, Type A dissection with bowel ischemia requiring bowel resection / repeat sternotomy, and CABG x2, CHF (mixed systolic / diastolic), mitral / aortic valve replacement, CKD (with short term on HD 2022), A.fib s/p ablation pending watchman, SDH (resolved as of July imaging). Patient presented to [facility] for 2 weeks of inability to tolerate oral intake. Denies any fever or chills. Has chronic diarrhea due to bowel resection. In the ER patient was afebrile. Found to have leukocytosis to 23k. Patient was found to have SANTOS.     O: NAD, A&Ox3, no diaphoresis, + chills  VS: T 98.4 (r), / 81, HR 91, R 18, Sat 95%ra  HEENT: AT, pupils reactive  Neck: supple  Lungs: CTA/bilat, no SOB  Heart: +S1, S2  Abd: soft, NT/ND,+ BS    A/P: Pt. c/o feeling cold and having chills. Pt. is s/p fever this morning of 102.2 and admits this is how he felt when he had his fever this morning.  - Pt. is s/p sepsis w/u, started on abx today, seen by ID today and is hemodynamically stable.   ** Case discussed with Dr. Garrett and ok to treat pt. with Ofirmev 1,000mg x 1 given for chills and impending fever.   - Continue tele/ pulse- ox monitoring and current treatment regime. No further ord. given at present. Nsg to continue to monitor pt. progress,     ** Pt.'s chills subsided, no longs feels cold and is without further c/o @ present. Nsg to continue to monitor pt. progress.

## 2023-09-25 NOTE — DIETITIAN INITIAL EVALUATION ADULT - ORAL INTAKE PTA/DIET HISTORY
Pt sleeping and unarousable at this time. Pt assessed in May 2023 where pt was eating well with no weight loss. Now presents with decreased po intake x 2 weeks and vomiting noted.  [facility] weight is 179# which indicates 12# weight loss x 4 mo. Pt on clears with fluid restriction.

## 2023-09-25 NOTE — PROGRESS NOTE ADULT - SUBJECTIVE AND OBJECTIVE BOX
POSTOPERATIVE VISIT     SUBJECTIVE:  The patient returns today now 2 weeks after undergoing right shoulder arthroscopy with debridement, acromioplasty, and 1 cm infraspinatus rotator cuff repair.  Patient states that his shoulder is doing well.  He has been working on his shoulder stretching exercises.  He has had no further injuries..    PHYSICAL EXAMINATION:  General:  The patient is alert and oriented x 3.  Extremities:  His right upper extremity is warm and well perfused.  His sensation is intact to light touch.  His skin shows excellent healing.  His sutures were removed without difficulty.  Gentle passive range of motion of her shoulder reveals forward elevation 120° and external rotation side 20°.  This is smooth without crepitance.    PLAN:  I reviewed with the patient the fact that he is doing very well at this point and will now begin working with the therapist on a 4 quadrant shoulder range of motion program.  He understands there will be no active use of his arm away from his side.  He will follow up in 4 weeks time for recheck.  All of his questions were answered.                 JONATHAN GIBSON    [id number]    63y      Male    CC: Nausea/ vomiting, SANTOS   wants to eat regular diet, nausea is better     INTERVAL HPI/OVERNIGHT EVENTS: febrile this am. started on merrem and vancomycin     REVIEW OF SYSTEMS:    CONSTITUTIONAL: No fever, weight loss, or fatigue  RESPIRATORY: mild cough, no wheezing,No shortness of breath  CARDIOVASCULAR: No chest pain, palpitations  GASTROINTESTINAL: No abdominal or epigastric pain.        Vital Signs Last 24 Hrs  T(C): 36.3 (25 Sep 2023 12:24), Max: 39 (25 Sep 2023 05:05)  T(F): 97.4 (25 Sep 2023 12:24), Max: 102.2 (25 Sep 2023 05:05)  HR: 60 (25 Sep 2023 13:40) (57 - 77)  BP: 111/67 (25 Sep 2023 13:40) (96/60 - 126/73)  BP(mean): 6 (25 Sep 2023 12:24) (6 - 6)  RR: 18 (25 Sep 2023 12:24) (18 - 18)  SpO2: 95% (25 Sep 2023 12:24) (95% - 100%)    Parameters below as of 25 Sep 2023 05:05  Patient On (Oxygen Delivery Method): room air            PHYSICAL EXAM:    GENERAL: NAD, well-groomed  HEENT: PERRL, +EOMI  NECK: soft, Supple, No JVD,   CHEST/LUNG: Clear to percussion bilaterally; No wheezing  HEART: S1S2+, Regular rate and rhythm; No murmurs  ABDOMEN: Soft, Nontender, Nondistended; Bowel sounds present. large midline healed scar uneven on anterior abdominal wall.   EXTREMITIES:   No clubbing, cyanosis, or edema  SKIN: No rashes or lesions  NEURO: AAOX3, no focal deficits            LABS:                                               12.4   18.52 )-----------( 99       ( 25 Sep 2023 05:50 )             37.3   09-25    131<L>  |  96  |  71.1<H>  ----------------------------<  99  3.8   |  17.0<L>  |  2.79<H>    Ca    8.0<L>      25 Sep 2023 05:50    TPro  6.5<L>  /  Alb  2.5<L>  /  TBili  0.7  /  DBili  x   /  AST  14  /  ALT  21  /  AlkPhos  125<H>  09-24      MEDICATIONS  (STANDING):  amLODIPine   Tablet 10 milliGRAM(s) Oral daily  apixaban 5 milliGRAM(s) Oral two times a day  atorvastatin 40 milliGRAM(s) Oral at bedtime  carvedilol 6.25 milliGRAM(s) Oral every 12 hours  levETIRAcetam 1000 milliGRAM(s) Oral two times a day  meropenem Injectable      meropenem Injectable 1000 milliGRAM(s) IV Push every 12 hours  sodium bicarbonate  Infusion 0.081 mEq/kG/Hr (83 mL/Hr) IV Continuous <Continuous>  tamsulosin 0.4 milliGRAM(s) Oral at bedtime    MEDICATIONS  (PRN):  acetaminophen     Tablet .. 650 milliGRAM(s) Oral every 6 hours PRN Temp greater or equal to 38C (100.4F), Mild Pain (1 - 3)  aluminum hydroxide/magnesium hydroxide/simethicone Suspension 30 milliLiter(s) Oral every 4 hours PRN Dyspepsia  melatonin 3 milliGRAM(s) Oral at bedtime PRN Insomnia  ondansetron Injectable 4 milliGRAM(s) IV Push every 8 hours PRN Nausea and/or Vomiting        RADIOLOGY & ADDITIONAL TESTS:    < from: CT Abdomen and Pelvis No Cont (09.23.23 @ 14:55) >  IMPRESSION:  No CT evidence of bowel obstruction or other acute intra-abdominal   process.    < end of copied text >    - MUSTAPHA 8/14/23 showed EF 55-60%

## 2023-09-25 NOTE — DIETITIAN NUTRITION RISK NOTIFICATION - ADDITIONAL COMMENTS/DIETITIAN RECOMMENDATIONS
Advance diet as per MD  Rec Dash  Provide HBV protein sources  Fluid restriction as needed  Monitor renal labs

## 2023-09-25 NOTE — PROGRESS NOTE ADULT - ASSESSMENT
Mr. 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 Labs consistent with acute renal failure likely in the setting of GI losses/ Poor PO intake, however patient with known HF (last EF 55-60% on MUSTAPHA), given 1L LR in ED, will continue with gentle fluid resuscitation with NS 50 cc/hr and monitor fluid balance/ Renal function. nephrology / cardiology consulted.     #Acute Renal Failure, associated metabolic acidosis   Likely Pre renal, in setting of GI losses / Poor PO intake   -  continue with NS at 100 cc/hr for 24 hrs  - Cr 3.5, baseline from last DC appears to be ~ 1.5-1.8, however patient did need short term HD in the recent past   - Nephrology consulted     #Intractable Nausea / Vomiting   ~ 2 weeks of progressively worsening inability to tolerate PO beginning with solids, now large volume liquids, no dysphagia, patient tolerating sips of liquid  - advance diet      #Fever, leucocytosis with ?source  CT A/P shows no acute pathology - Redemonstration on aortic dissection - findings discussed with Radiologist - Dr Piña- appears stable as compared to prior imaging in 5/2023 .   f/u blood cx   started on merrem, vancomycin   ECHO, appreciate ID eval         #Heart Failure with Preserved Ejection Fraction   Follows Dr. Frankel   - Continues on Coreg, will hold torsemide in light of likely dehydration / hyponatremia, resume when clinically appropriate   - MUSTAPHA - EF 55-60%  - currently looks hypovolemic clinically  - Cardiology consulted     #Elevated Trp   Likely in setting of renal failure   - .09 -> .08 will obtain 1 further value, if continues to downtrend will stop following   - Patient denies cardiac symptoms at this time       #Hyponatremia   In setting of poor oral intake, Na 127 -improving with ivfluids   monitor     #paroxysmal Atrial fibrillation   - Rate controlled currently, continue BB, monitor on Tele   - Stopped briefly AC due to SDH in recent past, however CT head 7/2023 shows complete resolution of SDH, resumed AC outpatient per Cardiology, planned for Watchman procedure this coming Oct    #history of SDH   As above, CT head showed complete resolution of recent SDH, continues on AC     #Mitral/Aortic valve replacement   -Recent MUSTAPHA shows bioprosthetic valves with only moderate paravalvular leak in MV.     DVT prophylaxis: Eliquis

## 2023-09-25 NOTE — CHART NOTE - NSCHARTNOTEFT_GEN_A_CORE
Chart/Event Note  [facility] [address]TWR [id number]  JONATHAN GIBSON, 63y, Male  [id number]    Reason for Notification: Fever  Notified by RN that the above patient had fever if 102.2. Other vitals wnl.      Events/History of Present Illness  Patient resting in bed at baseline mentation, in no apparent distress. Patient complains of intermittent chills associated with N/V, diarrhea, cough for several weeks. Currently denies any onset of new symptoms now. +Chills, N/V, diarrhea,   .chart    Review of Systems:  Constitutional: No fever, chills, or fatigue.  Neurologic: No headache, dizziness, vision/speech changes, numbness, or weakness.  Respiratory: No cough, wheezing, shortness of breath, or dypsnea.   Cardiovascular: No chest pain, pressure, or palpitations.   GI: No abdominal pain, nausea, vomiting, diarrhea, constipation.   : No dysuria, burning, frequency, incontinence, or retention.     T(C): 39 (09-25-23 @ 05:05), Max: 39 (09-25-23 @ 05:05)  HR: 77 (09-25-23 @ 05:05) (61 - 77)  BP: 126/73 (09-25-23 @ 05:05) (103/60 - 126/73)  RR: 18 (09-25-23 @ 05:05) (18 - 18)  SpO2: 98% (09-25-23 @ 05:05) (96% - 100%)                        13.0   23.28 )-----------( 104      ( 24 Sep 2023 05:46 )             39.0          CARDIAC MARKERS ( 23 Sep 2023 16:06 )  x     / 0.08 ng/mL / x     / x     / x      CARDIAC MARKERS ( 23 Sep 2023 12:15 )  x     / 0.09 ng/mL / x     / x     / x                    Physical Examination:  GENERAL: No acute distress noted during examination.   NERVOUS SYSTEM:  Alert & oriented X3 with appropriate concentration. Motor strength is 5/5 in both bilateral upper and lower extremities. No focal or lateralizing neurologic deficits noted.   HEAD: Atraumatic and normocephalic.  EYES: EOMI/PERRLA. Conjunctiva and sclera clear  ENMT: Moist mucous membranes.   NECK: Supple with no JVD.   CHEST: Clear to ascultation bilaterally. No rales, rhonchi, wheezing, or rubs heard. Symmetrical/bilateral chest wall rise.   HEART: Regular rate and rhythm. Normotensive.  ABDOMEN: Soft, nontender, and nondistended.   EXTREMITIES:  2+ peripheral pulses without clubbing, cyanosis, or edema.     Medical Decision Making/Assessment/Plan:  63y-year-old Male with a past medical history of ______ , admitted for ___, now with _____ .       1)   2)   3)   4) Will endorse the above diagnostics and findings to the day medicine team for review and follow up. Will additionally sign out to day medicine teams to follow with relevant specialties.     Betty Moreau NP  Department of Medicine Chart/Event Note  [facility] [id number]  JONATHAN GIBSON, 63y, Male  [id number]    Reason for Notification: Fever  Notified by RN that the above patient had fever if 102.2. Other vitals wnl.      Events/History of Present Illness  Patient resting in bed at baseline mentation, in no apparent distress. Patient complains of intermittent chills associated with N/V, diarrhea, cough for several weeks. Currently denies any onset of new symptoms now. +Chills, N/V, diarrhea, cough. Denies chest pain, palpitations, sob, abdominal pain, dizziness, lightheadedness.   Patient presented to ED with N/V, inability to tolerate PO. Found to have leukocytosis in ED, work up performed within last 48 hours. Workup was negative including BCx, U/A, CXR, CT a/p. U/A performed on 9/25 at 01:00 negative. Patient had been afebrile up to this point.     Review of Systems:  Constitutional: + chills. No fatigue.  Neurologic: No headache, dizziness, vision/speech changes, numbness, or weakness.  Respiratory: + non-productive cough. No wheezing, shortness of breath, or dyspnea.   Cardiovascular: No chest pain, pressure, or palpitations.   GI: + Nausea, vomiting, diarrhea, No abdominal pain or constipation.   : No dysuria, burning, frequency, incontinence, or retention.     T(C): 39 (09-25-23 @ 05:05), Max: 39 (09-25-23 @ 05:05)  HR: 77 (09-25-23 @ 05:05) (61 - 77)  BP: 126/73 (09-25-23 @ 05:05) (103/60 - 126/73)  RR: 18 (09-25-23 @ 05:05) (18 - 18)  SpO2: 98% (09-25-23 @ 05:05) (96% - 100%)                        13.0   23.28 )-----------( 104      ( 24 Sep 2023 05:46 )             39.0      Culture - Blood (09.23.23 @ 19:30)   Specimen Source: .Blood Blood-Peripheral  Culture Results:   No growth at 24 hours      Urinalysis (09.25.23 @ 01:00)   Glucose Qualitative, Urine: Negative mg/dL  Blood, Urine: Large  pH Urine: 5.0  Color: Yellow  Urine Appearance: Clear  Bilirubin: Negative  Ketone - Urine: Negative  Specific Gravity: 1.020  Protein, Urine: 100  Urobilinogen: Negative mg/dL  Nitrite: Negative  Leukocyte Esterase Concentration: Negative    < from: Xray Chest 1 View- PORTABLE-Urgent (Xray Chest 1 View- PORTABLE-Urgent .) (09.23.23 @ 11:37) >    PROCEDURE DATE:  09/23/2023      INTERPRETATION:  AP chest on September 23, 2023 11:12 AM. Patient has   atrial fibrillation and is short of breath.    Heart magnified by technique.    Sternotomy and prosthetic heart valves again noted.    Endotracheal tube and nasogastric tube seen on May 23 of no longer   evident.    There is a persistent small linear density in the retrocardiac area   similar to prior.    Clips overlying right scapula again seen.    IMPRESSION: Persistent small linear density in the retrocardiac region.   Stable cardiac findings.    < end of copied text >      < from: CT Abdomen and Pelvis No Cont (09.23.23 @ 14:55) >    PROCEDURE DATE:  09/23/2023          INTERPRETATION:  CLINICAL INFORMATION: Postprandial vomiting. History of   small bowel obstruction.    COMPARISON: CT chest abdomen pelvis dated 5/22/2023.    CONTRAST/COMPLICATIONS:  IV Contrast: None  Oral Contrast: None  Complications: None    PROCEDURE:  CT of the Abdomen and Pelvis was performed.  Sagittal and coronal reformats were performed.    FINDINGS:  LOWER CHEST: Aortic and mitral valve replacement. Coronary artery   calcification. Perivascular halo in the right lower lobe.    LIVER: Within normal limits.  BILE DUCTS: Normal caliber.  GALLBLADDER: Small gallstone. No pericholecystic inflammation.  SPLEEN: Within normal limits.  PANCREAS: Within normal limits.  ADRENALS: Within normal limits.  KIDNEYS/URETERS: 2 cm cortical cyst upper pole right kidney. No   hydronephrosis.    BLADDER: Within normal limits.  REPRODUCTIVE ORGANS: Prostate is enlarged.    BOWEL: No bowel obstruction. Small bowel anastomosis. Appendix not   visualized.  PERITONEUM: No ascites.  VESSELS: Redemonstration of aortic dissection. Calcified atheromatous   plaque.  RETROPERITONEUM/LYMPH NODES: No lymphadenopathy.  ABDOMINAL WALL: Within normal limits.  BONES: Within normal limits.    IMPRESSION:  No CT evidence of bowel obstruction or other acute intra-abdominal   process.    < end of copied text >      Physical Examination:  GENERAL: No acute distress noted during examination.   NERVOUS SYSTEM:  Alert & oriented X3 with appropriate concentration. No focal or lateralizing neurologic deficits noted.   HEAD: Atraumatic and normocephalic.  EYES: EOMI/PERRLA. Conjunctiva and sclera clear  ENMT: Moist mucous membranes.   NECK: Supple with no JVD.   CHEST: Clear to ascultation bilaterally. No rales, rhonchi, wheezing, or rubs heard. Symmetrical/bilateral chest wall rise.   HEART: Regular rate and rhythm. Normotensive.  ABDOMEN: Soft, nontender, and nondistended.   EXTREMITIES:  + peripheral pulses without clubbing, cyanosis, or edema.     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     1) Ofirmev x1  2) BCx, CXR, RVP, Urine legionella, GI PCR/ Cx, lactate, procal, CBC w/ diff, BMP  3) Empiric abx   -will sign out to day team for f/u of results    Betty Moreau NP  Department of Medicine Chart/Event Note  [facility] [id number]  JONATHAN GIBSON, 63y, Male  [id number]    Reason for Notification: Fever  Notified by RN that the above patient had fever if 102.2. Other vitals wnl.      Events/History of Present Illness  Patient resting in bed at baseline mentation, in no apparent distress. Patient complains of intermittent chills associated with N/V, diarrhea, cough for several weeks. Currently denies any onset of new symptoms now. +Chills, N/V, diarrhea, cough. Denies chest pain, palpitations, sob, abdominal pain, dizziness, lightheadedness.   Patient presented to ED with N/V, inability to tolerate PO. Found to have leukocytosis in ED, work up performed within last 48 hours. Workup was negative including BCx, U/A, CXR, CT a/p. U/A performed on 9/25 at 01:00 negative. Patient had been afebrile up to this point.     Review of Systems:  Constitutional: + chills. No fatigue.  Neurologic: No headache, dizziness, vision/speech changes, numbness, or weakness.  Respiratory: + non-productive cough. No wheezing, shortness of breath, or dyspnea.   Cardiovascular: No chest pain, pressure, or palpitations.   GI: + Nausea, vomiting, diarrhea, No abdominal pain or constipation.   : No dysuria, burning, frequency, incontinence, or retention.     T(C): 39 (09-25-23 @ 05:05), Max: 39 (09-25-23 @ 05:05)  HR: 77 (09-25-23 @ 05:05) (61 - 77)  BP: 126/73 (09-25-23 @ 05:05) (103/60 - 126/73)  RR: 18 (09-25-23 @ 05:05) (18 - 18)  SpO2: 98% (09-25-23 @ 05:05) (96% - 100%)                        13.0   23.28 )-----------( 104      ( 24 Sep 2023 05:46 )             39.0      Culture - Blood (09.23.23 @ 19:30)   Specimen Source: .Blood Blood-Peripheral  Culture Results:   No growth at 24 hours      Urinalysis (09.25.23 @ 01:00)   Glucose Qualitative, Urine: Negative mg/dL  Blood, Urine: Large  pH Urine: 5.0  Color: Yellow  Urine Appearance: Clear  Bilirubin: Negative  Ketone - Urine: Negative  Specific Gravity: 1.020  Protein, Urine: 100  Urobilinogen: Negative mg/dL  Nitrite: Negative  Leukocyte Esterase Concentration: Negative    < from: Xray Chest 1 View- PORTABLE-Urgent (Xray Chest 1 View- PORTABLE-Urgent .) (09.23.23 @ 11:37) >    PROCEDURE DATE:  09/23/2023      INTERPRETATION:  AP chest on September 23, 2023 11:12 AM. Patient has   atrial fibrillation and is short of breath.    Heart magnified by technique.    Sternotomy and prosthetic heart valves again noted.    Endotracheal tube and nasogastric tube seen on May 23 of no longer   evident.    There is a persistent small linear density in the retrocardiac area   similar to prior.    Clips overlying right scapula again seen.    IMPRESSION: Persistent small linear density in the retrocardiac region.   Stable cardiac findings.    < end of copied text >      < from: CT Abdomen and Pelvis No Cont (09.23.23 @ 14:55) >    PROCEDURE DATE:  09/23/2023          INTERPRETATION:  CLINICAL INFORMATION: Postprandial vomiting. History of   small bowel obstruction.    COMPARISON: CT chest abdomen pelvis dated 5/22/2023.    CONTRAST/COMPLICATIONS:  IV Contrast: None  Oral Contrast: None  Complications: None    PROCEDURE:  CT of the Abdomen and Pelvis was performed.  Sagittal and coronal reformats were performed.    FINDINGS:  LOWER CHEST: Aortic and mitral valve replacement. Coronary artery   calcification. Perivascular halo in the right lower lobe.    LIVER: Within normal limits.  BILE DUCTS: Normal caliber.  GALLBLADDER: Small gallstone. No pericholecystic inflammation.  SPLEEN: Within normal limits.  PANCREAS: Within normal limits.  ADRENALS: Within normal limits.  KIDNEYS/URETERS: 2 cm cortical cyst upper pole right kidney. No   hydronephrosis.    BLADDER: Within normal limits.  REPRODUCTIVE ORGANS: Prostate is enlarged.    BOWEL: No bowel obstruction. Small bowel anastomosis. Appendix not   visualized.  PERITONEUM: No ascites.  VESSELS: Redemonstration of aortic dissection. Calcified atheromatous   plaque.  RETROPERITONEUM/LYMPH NODES: No lymphadenopathy.  ABDOMINAL WALL: Within normal limits.  BONES: Within normal limits.    IMPRESSION:  No CT evidence of bowel obstruction or other acute intra-abdominal   process.    < end of copied text >      Physical Examination:  GENERAL: No acute distress noted during examination.   NERVOUS SYSTEM:  Alert & oriented X3 with appropriate concentration. No focal or lateralizing neurologic deficits noted.   HEAD: Atraumatic and normocephalic.  EYES: EOMI/PERRLA. Conjunctiva and sclera clear  ENMT: Moist mucous membranes.   NECK: Supple with no JVD.   CHEST: Clear to ascultation bilaterally. No rales, rhonchi, wheezing, or rubs heard. Symmetrical/bilateral chest wall rise.   HEART: Regular rate and rhythm. Normotensive.  ABDOMEN: Soft, nontender, and nondistended.   EXTREMITIES:  + peripheral pulses without clubbing, cyanosis, or edema.     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     1) Ofirmev x1  2) BCx, CXR, RVP, Urine legionella, GI PCR/ Cx, lactate, procal, CBC w/ diff, BMP  3) Empiric abx: Vanco x1, Val 1gm Q8hr  -will sign out to day team for f/u of results  *Discussed with angela Moreau NP  Department of Medicine

## 2023-09-26 ENCOUNTER — APPOINTMENT (OUTPATIENT)
Dept: CARDIOLOGY | Facility: CLINIC | Age: 63
End: 2023-09-26

## 2023-09-26 DIAGNOSIS — Z95.2 PRESENCE OF PROSTHETIC HEART VALVE: ICD-10-CM

## 2023-09-26 DIAGNOSIS — R78.81 BACTEREMIA: ICD-10-CM

## 2023-09-26 LAB
ANION GAP SERPL CALC-SCNC: 11 MMOL/L — SIGNIFICANT CHANGE UP (ref 5–17)
ANION GAP SERPL CALC-SCNC: 14 MMOL/L — SIGNIFICANT CHANGE UP (ref 5–17)
BUN SERPL-MCNC: 61.7 MG/DL — HIGH (ref 8–20)
BUN SERPL-MCNC: 70.4 MG/DL — HIGH (ref 8–20)
CALCIUM SERPL-MCNC: 6.4 MG/DL — CRITICAL LOW (ref 8.4–10.5)
CALCIUM SERPL-MCNC: 8.6 MG/DL — SIGNIFICANT CHANGE UP (ref 8.4–10.5)
CHLORIDE SERPL-SCNC: 96 MMOL/L — SIGNIFICANT CHANGE UP (ref 96–108)
CHLORIDE SERPL-SCNC: 97 MMOL/L — SIGNIFICANT CHANGE UP (ref 96–108)
CO2 SERPL-SCNC: 21 MMOL/L — LOW (ref 22–29)
CO2 SERPL-SCNC: 27 MMOL/L — SIGNIFICANT CHANGE UP (ref 22–29)
CREAT SERPL-MCNC: 1.79 MG/DL — HIGH (ref 0.5–1.3)
CREAT SERPL-MCNC: 2.27 MG/DL — HIGH (ref 0.5–1.3)
CULTURE RESULTS: SIGNIFICANT CHANGE UP
CULTURE RESULTS: SIGNIFICANT CHANGE UP
EGFR: 32 ML/MIN/1.73M2 — LOW
EGFR: 42 ML/MIN/1.73M2 — LOW
GLUCOSE SERPL-MCNC: 127 MG/DL — HIGH (ref 70–99)
GLUCOSE SERPL-MCNC: 78 MG/DL — SIGNIFICANT CHANGE UP (ref 70–99)
GRAM STN FLD: SIGNIFICANT CHANGE UP
HCT VFR BLD CALC: 35.3 % — LOW (ref 39–50)
HGB BLD-MCNC: 11.6 G/DL — LOW (ref 13–17)
MAGNESIUM SERPL-MCNC: 2 MG/DL — SIGNIFICANT CHANGE UP (ref 1.6–2.6)
MCHC RBC-ENTMCNC: 28.2 PG — SIGNIFICANT CHANGE UP (ref 27–34)
MCHC RBC-ENTMCNC: 32.9 GM/DL — SIGNIFICANT CHANGE UP (ref 32–36)
MCV RBC AUTO: 85.9 FL — SIGNIFICANT CHANGE UP (ref 80–100)
PLATELET # BLD AUTO: 102 K/UL — LOW (ref 150–400)
POTASSIUM SERPL-MCNC: 3.2 MMOL/L — LOW (ref 3.5–5.3)
POTASSIUM SERPL-MCNC: 3.5 MMOL/L — SIGNIFICANT CHANGE UP (ref 3.5–5.3)
POTASSIUM SERPL-SCNC: 3.2 MMOL/L — LOW (ref 3.5–5.3)
POTASSIUM SERPL-SCNC: 3.5 MMOL/L — SIGNIFICANT CHANGE UP (ref 3.5–5.3)
RBC # BLD: 4.11 M/UL — LOW (ref 4.2–5.8)
RBC # FLD: 12.8 % — SIGNIFICANT CHANGE UP (ref 10.3–14.5)
SODIUM SERPL-SCNC: 131 MMOL/L — LOW (ref 135–145)
SODIUM SERPL-SCNC: 134 MMOL/L — LOW (ref 135–145)
SPECIMEN SOURCE: SIGNIFICANT CHANGE UP
SPECIMEN SOURCE: SIGNIFICANT CHANGE UP
VANCOMYCIN FLD-MCNC: 10.1 UG/ML — SIGNIFICANT CHANGE UP
WBC # BLD: 18.81 K/UL — HIGH (ref 3.8–10.5)
WBC # FLD AUTO: 18.81 K/UL — HIGH (ref 3.8–10.5)

## 2023-09-26 PROCEDURE — 99233 SBSQ HOSP IP/OBS HIGH 50: CPT

## 2023-09-26 PROCEDURE — 93306 TTE W/DOPPLER COMPLETE: CPT | Mod: 26

## 2023-09-26 PROCEDURE — 99222 1ST HOSP IP/OBS MODERATE 55: CPT

## 2023-09-26 PROCEDURE — 99222 1ST HOSP IP/OBS MODERATE 55: CPT | Mod: 25

## 2023-09-26 RX ORDER — CALCIUM GLUCONATE 100 MG/ML
1 VIAL (ML) INTRAVENOUS ONCE
Refills: 0 | Status: COMPLETED | OUTPATIENT
Start: 2023-09-26 | End: 2023-09-26

## 2023-09-26 RX ORDER — POTASSIUM CHLORIDE 20 MEQ
20 PACKET (EA) ORAL
Refills: 0 | Status: COMPLETED | OUTPATIENT
Start: 2023-09-26 | End: 2023-09-26

## 2023-09-26 RX ORDER — ACETAMINOPHEN 500 MG
1000 TABLET ORAL ONCE
Refills: 0 | Status: COMPLETED | OUTPATIENT
Start: 2023-09-26 | End: 2023-09-26

## 2023-09-26 RX ORDER — SODIUM BICARBONATE 1 MEQ/ML
0.08 SYRINGE (ML) INTRAVENOUS
Qty: 75 | Refills: 0 | Status: DISCONTINUED | OUTPATIENT
Start: 2023-09-26 | End: 2023-10-01

## 2023-09-26 RX ORDER — VANCOMYCIN HCL 1 G
1000 VIAL (EA) INTRAVENOUS EVERY 24 HOURS
Refills: 0 | Status: DISCONTINUED | OUTPATIENT
Start: 2023-09-26 | End: 2023-09-27

## 2023-09-26 RX ADMIN — Medication 83 MEQ/KG/HR: at 18:56

## 2023-09-26 RX ADMIN — LEVETIRACETAM 1000 MILLIGRAM(S): 250 TABLET, FILM COATED ORAL at 18:08

## 2023-09-26 RX ADMIN — APIXABAN 5 MILLIGRAM(S): 2.5 TABLET, FILM COATED ORAL at 18:09

## 2023-09-26 RX ADMIN — AMLODIPINE BESYLATE 10 MILLIGRAM(S): 2.5 TABLET ORAL at 05:48

## 2023-09-26 RX ADMIN — LEVETIRACETAM 1000 MILLIGRAM(S): 250 TABLET, FILM COATED ORAL at 05:48

## 2023-09-26 RX ADMIN — APIXABAN 5 MILLIGRAM(S): 2.5 TABLET, FILM COATED ORAL at 05:48

## 2023-09-26 RX ADMIN — Medication 83 MEQ/KG/HR: at 12:30

## 2023-09-26 RX ADMIN — Medication 20 MILLIEQUIVALENT(S): at 12:45

## 2023-09-26 RX ADMIN — CARVEDILOL PHOSPHATE 6.25 MILLIGRAM(S): 80 CAPSULE, EXTENDED RELEASE ORAL at 05:48

## 2023-09-26 RX ADMIN — CARVEDILOL PHOSPHATE 6.25 MILLIGRAM(S): 80 CAPSULE, EXTENDED RELEASE ORAL at 18:10

## 2023-09-26 RX ADMIN — Medication 250 MILLIGRAM(S): at 14:18

## 2023-09-26 RX ADMIN — Medication 400 MILLIGRAM(S): at 11:33

## 2023-09-26 RX ADMIN — Medication 20 MILLIEQUIVALENT(S): at 09:16

## 2023-09-26 RX ADMIN — TAMSULOSIN HYDROCHLORIDE 0.4 MILLIGRAM(S): 0.4 CAPSULE ORAL at 21:38

## 2023-09-26 RX ADMIN — Medication 100 GRAM(S): at 08:31

## 2023-09-26 RX ADMIN — ATORVASTATIN CALCIUM 40 MILLIGRAM(S): 80 TABLET, FILM COATED ORAL at 21:38

## 2023-09-26 RX ADMIN — MEROPENEM 1000 MILLIGRAM(S): 1 INJECTION INTRAVENOUS at 05:48

## 2023-09-26 NOTE — PROGRESS NOTE ADULT - ASSESSMENT
64 yo M with a PMH significant for HTN, HLD, Type A dissection with bowel ischemia requiring bowel resection / repeat sternotomy, and CABG x2, CHF (mixed systolic / diastolic), mitral / aortic valve replacement, CKD (with short term on HD 2022), A.fib s/p ablation pending watchman, SDH (resolved as of July imaging) who presents to [facility] for ~ 2 weeks of inability to tolerate oral intake. Labs consistent with acute renal failure likely in the setting of GI losses/ Poor PO intake, however patient with known HF (last EF 55-60% on MUSTAPHA), given 1L LR in ED, will continue with gentle fluid resuscitation with NS 50 cc/hr and monitor fluid balance/ Renal function. nephrology / cardiology consulted.       # sepsis with staphylococcal epididermiditis bacteremia ? source   f/u repeat blood cx  Pt hs Aortic graft and dual valve replaced   MUSTAPHA requested, ID and cardio following   CT A/P shows no acute pathology - Redemonstration on aortic dissection - findings discussed with Radiologist - Dr Piña- appears stable as compared to prior imaging in 5/2023 .   dc merrem, ct vancomycin         #Acute Renal Failure, associated metabolic acidosis   Likely Pre renal, in setting of GI losses / Poor PO intake   - Cr 3.5, baseline from last DC appears to be ~ 1.5-1.8, however patient did need short term HD in the recent past   - Nephrology following     #Intractable Nausea / Vomiting   - resolved at this time - likely 2/2 bacteremia/sepsis.         #Heart Failure with Preserved Ejection Fraction   Follows Dr. Frankel   - Continues on Coreg, will hold torsemide in light of likely dehydration / hyponatremia, resume when clinically appropriate   - MUSTAPHA - EF 55-60%      #Elevated Trp   Likely in setting of renal failure       #Hyponatremia   In setting of poor oral intake, mproving with ivfluids       #paroxysmal Atrial fibrillation   - Rate controlled currently, continue BB, monitor on Tele   - Stopped briefly AC due to SDH in recent past, however CT head 7/2023 shows complete resolution of SDH, resumed AC outpatient per Cardiology, planned for Watchman procedure this coming Oct    #history of SDH   As above, CT head showed complete resolution of recent SDH, continues on AC     #Mitral/Aortic valve replacement   -Recent MUSTAPHA shows bioprosthetic valves with only moderate paravalvular leak in MV.     DVT prophylaxis: Eliquis   Medically acute

## 2023-09-26 NOTE — PROGRESS NOTE ADULT - ASSESSMENT
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 Patient presented to [facility] for 2 weeks of inability to tolerate oral intake. Denies any fever or chills. Has chronic diarrhea due to bowel resection. In the ER patient was afebrile. Found to have leukocytosis to 23k. Patient was found to have SANTOS. Had a febrile episode to 102F on 9/25. Started on Vancomycin and Meropenem. Blood cultures 1 of 4 bottles with GPC.      Positive blood culture   Fever  Leukocytosis   SANTOS       - Blood cultures 9/23  3/4 COAG NEG STAPH  AND 2 IDENTIFIED AS STAPH EPIDERMIDIS   - Repeat blood cultures 9/25 pending  -   - CT A/P 9/23 reporting no acute findings   - CXR 9/23 no PNA  -PT WITH  MVR  AND AVR   IN PLACE  STAPH EPIDERMIDIS IS CONCERNING IN THIS SETTING  SUGGEST MUSTAPHA TO R/O VEGETATIONS   SPOKE TO CARDIOLOGY   WILL FOLLOW UP  WOULD CONTINUE VANCO CAN D/C MERREM  WILL FOLLOWUP SENSITIVITIES OF  STAPH EPIDERMIDIS

## 2023-09-26 NOTE — CONSULT NOTE ADULT - ASSESSMENT
63y Male with PMH of  HTN, HLD, known to cardiac surgery for Type A dissection complicated by bowel ischemia requiring bowel resection / repeat sternotomy, CABG x 2, AVR, MVR 11/2020 with Dr. Butler, CHF (mixed systolic / diastolic), CKD (with short term on HD 2022), A.fib s/p ablation pending watchman, SDH (resolved as of July imaging). Patient presented to [facility] for 2 weeks of inability to tolerate oral intake. Denies any fever or chills. Has chronic diarrhea due to bowel resection. In the ER patient was afebrile but found to have leukocytosis to 23k. Patient was found to have SANTOS. Had a febrile episode to 102F on 9/25. Started on Vancomycin and Meropenem. Blood cultures 2 of 4 bottles with Gram positive cocci. TTE 9/26 revealed large mobile lesion on bioprosthetic MVR, likely suggestive of vegetation. CT surgery consulted for evaluation of bioprosthetic MVR lesion.      63y Male with PMH of  HTN, HLD, known to cardiac surgery for Type A dissection complicated by bowel ischemia requiring bowel resection / repeat sternotomy, CABG x 2, AVR, MVR 11/2020 with Dr. Butler, CHF (mixed systolic / diastolic), CKD (with short term on HD 2022), A.fib s/p ablation pending watchman with Dr Trevino, SDH (resolved as of July imaging). Patient presented to [facility] for 2 weeks of inability to tolerate oral intake. Denies any fever or chills. Has chronic diarrhea due to bowel resection. In the ER patient was afebrile but found to have leukocytosis to 23k. Patient was found to have SANTOS. Had a febrile episode to 102F on 9/25. Started on Vancomycin and Meropenem. Blood cultures 2 of 4 bottles with Gram positive cocci. TTE 9/26 revealed large mobile lesion on bioprosthetic MVR, likely suggestive of vegetation. CT surgery consulted for evaluation of bioprosthetic MVR lesion.

## 2023-09-26 NOTE — CONSULT NOTE ADULT - SUBJECTIVE AND OBJECTIVE BOX
Consult for surgeon: Luke    HPI:  63y Male with PMH of  HTN, HLD, known to cardiac surgery for Type A dissection complicated by bowel ischemia requiring bowel resection / repeat sternotomy, CABG x 2, AVR, MVR 11/2020 with Dr. Butler, CHF (mixed systolic / diastolic), CKD (with short term on HD 2022), A.fib s/p ablation pending watchman, SDH (resolved as of July imaging). Patient presented to [facility] for 2 weeks of inability to tolerate oral intake. Denies any fever or chills. Has chronic diarrhea due to bowel resection. In the ER patient was afebrile but found to have leukocytosis to 23k. Patient was found to have SANTOS. Had a febrile episode to 102F on 9/25. Started on Vancomycin and Meropenem. Blood cultures 2 of 4 bottles with Gram positive cocci. TTE 9/26 revealed large mobile lesion on bioprosthetic MVR, likely suggestive of vegetation. CT surgery consulted for evaluation of bioprosthetic MVR lesion.     Patient denies acute pain with radiating or aggravating factors.  He denies chest pain, shortness of breath, palpitations, headache, dizziness, nausea, or vomiting.     Review of systems:      Past Medical/Surgical History:  CHF (congestive heart failure)    CVA (cerebral vascular accident)    Seizures  last seizure 10 years ago    HTN (hypertension)    Hyperlipemia    COVID-19  october 2020    Atrial fibrillation    Former smoker    History of cocaine use  remote hx, currently sober    H/O aortic dissection  s/p repair (2010), surgery complicated by bowel ischemia s/p bowel resection and ostomy with reversal    H/O aortic valve insufficiency    Mitral regurgitation    GIB (gastrointestinal bleeding)    CAD (coronary artery disease)  s/p PCI 1998  and CABG x 2 11/2020    Chronic atrial fibrillation    H/O colectomy    Status post double vessel coronary artery bypass  11/2020 Dr Butler    S/P AVR (aortic valve replacement)  (t)AVR 11/2020 Dr Butler    S/P MVR (mitral valve replacement)  (t)MVR 11/2020 Dr Butler    H/O aortic dissection  Type A; emergent repair 2010    AV fistula  LUE          Home Medications:  MEDICATIONS  (PRN):  acetaminophen     Tablet .. 650 milliGRAM(s) Oral every 6 hours PRN Temp greater or equal to 38C (100.4F), Mild Pain (1 - 3)  aluminum hydroxide/magnesium hydroxide/simethicone Suspension 30 milliLiter(s) Oral every 4 hours PRN Dyspepsia  melatonin 3 milliGRAM(s) Oral at bedtime PRN Insomnia  ondansetron Injectable 4 milliGRAM(s) IV Push every 8 hours PRN Nausea and/or Vomiting      Standing Medications:  MEDICATIONS  (STANDING):  amLODIPine   Tablet 10 milliGRAM(s) Oral daily  apixaban 5 milliGRAM(s) Oral two times a day  atorvastatin 40 milliGRAM(s) Oral at bedtime  carvedilol 6.25 milliGRAM(s) Oral every 12 hours  levETIRAcetam 1000 milliGRAM(s) Oral two times a day  sodium bicarbonate  Infusion 0.081 mEq/kG/Hr (83 mL/Hr) IV Continuous <Continuous>  tamsulosin 0.4 milliGRAM(s) Oral at bedtime  vancomycin  IVPB 1000 milliGRAM(s) IV Intermittent every 24 hours      PRN Medications:  MEDICATIONS  (PRN):  acetaminophen     Tablet .. 650 milliGRAM(s) Oral every 6 hours PRN Temp greater or equal to 38C (100.4F), Mild Pain (1 - 3)  aluminum hydroxide/magnesium hydroxide/simethicone Suspension 30 milliLiter(s) Oral every 4 hours PRN Dyspepsia  melatonin 3 milliGRAM(s) Oral at bedtime PRN Insomnia  ondansetron Injectable 4 milliGRAM(s) IV Push every 8 hours PRN Nausea and/or Vomiting      Anti-coagulation:  Anti-platelet-  Plavix, Effient, Brillinta                  Last Dose:  Coumadin-                                                                 Last Dose:  NOAC- Xarelto, Eliquis                                             Last Dose:    Allergies:  penicillin (Other)      Social History:  Smoking        -Never smoked  -Former/Active smoker, smoked ciagrettes/cigars ___ PPD x ___ years, quit ____ years ago    Alcohol  -No active alcohol conusmption   -Consumes alcohol daily/socially, ___ drinks, ___ CAGE    Illicit Drug use  -No illicit drug use   -Former/Active ____________ drug use, last used ______________     Occupation   -Currently employed as a _______________  -Retired/Former ________________    Residence  Private home with spouse/family  Assisted living facility- ______________  Nursing Home- ________________  Rehab- _____________    ADLs  Drives [yes]   [no]  Ambulation   [no assistance]   [cane]    [walker]   [wheelchair]  Stairs in home?  [no]   [yes, how many?]    Family History  Mother- [_] DM   [_] CAD   [_] MI < 55  Father- [_] DM    [_] CAD   [_] MI < 55    Physical Exam  T(F): 97.6 (09-26-23 @ 16:22)  HR: 63 (09-26-23 @ 18:14)  BP: 123/68 (09-26-23 @ 18:14)  RR: 18 (09-26-23 @ 16:22)  SpO2: 96% (09-26-23 @ 16:22)    General: Well nourished, well developed, no acute distress.                                                         Neuro: Normal exam oriented to person/place & time with no focal motor or sensory  deficits.                    Neck: no JVD noted  Chest: Normal lung exam with good air movement absence of wheezes, rales, or rhonchi                                                                         CV:  Auscultation: normal [ ] S3[ ] S4[ ] Irregular [ ] Rub[ ] Clicks[ ]  Murmurs none:[ ]systolic [ ]  diastolic [ ] holosystolic [ ]  Carotids: No Bruits appreciated                                                                  GI: soft, NT, ND, normoactive bowel sounds                                                                                             Extremities: + __ pitting edema b/l lower extremities   Lower Extremity Pulses: + DP, popliteal, femoral pulses b/l lower extremities                          11.6   18.81 )-----------( 102      ( 26 Sep 2023 06:26 )             35.3     09-26    131<L>  |  97  |  70.4<H>  ----------------------------<  127<H>  3.5   |  21.0<L>  |  2.27<H>    Ca    8.6      26 Sep 2023 10:50  Mg     2.0     09-26        Urinalysis Basic - ( 26 Sep 2023 10:50 )    Color: x / Appearance: x / SG: x / pH: x  Gluc: 127 mg/dL / Ketone: x  / Bili: x / Urobili: x   Blood: x / Protein: x / Nitrite: x   Leuk Esterase: x / RBC: x / WBC x   Sq Epi: x / Non Sq Epi: x / Bacteria: x      CARDIAC MARKERS ( 25 Sep 2023 05:50 )  x     / x     / 67 U/L / x     / x          Imaging      TTE-  < from: TTE Echo Complete w/o Contrast w/ Doppler (09.26.23 @ 14:32) >    Summary:   1. Left ventricular ejection fraction, by visual estimation, is 50 to   55%.   2. Normal global left ventricular systolic function.   3. Diastolic function indeterminate.   4. Abnormal septal motion consistent with post-operative status.   5. Normal RV size with mildly reduced RV systolic function, inadequate   estimation of RVSP.   6. Bioprosthetic mitral valve present with a large mobile lesion (at   least 2.0 cm x 1.5 cm) prolapsing into and out of the LV-LA cavity,   hightly suggestive of vegetation. Mean transmitral valve gradient 4.8   mmHg (at HR of 60 bpm).   7. Bioprosthesis in the aortic position, mean gradient of 9 mmHg.   8. There is no evidence of pericardial effusion.   9. Compared to the prior TTE study from 5/23/23, lesion over bioMVR is   again noted and is now much larger and recommend MUSTAPHA study to confirm   vegetation and exclude other valvular involvement.    Juan Gomez DO Electronically signed on 9/26/2023 at 4:23:59 PM        *** Final ***    < end of copied text >      MUSTAPHA- pending tomorrow 9/27         Consult for surgeon: Luke    HPI:  63y Male with PMH of  HTN, HLD, known to cardiac surgery for Type A dissection complicated by bowel ischemia requiring bowel resection / repeat sternotomy, CABG x 2, AVR, MVR 11/2020 with Dr. Butler, CHF (mixed systolic / diastolic), CKD (with short term on HD 2022), A.fib s/p ablation pending watchman, SDH (resolved as of July imaging). Patient presented to [facility] for 2 weeks of inability to tolerate oral intake. Denies any fever or chills. Has chronic diarrhea due to bowel resection. In the ER patient was afebrile but found to have leukocytosis to 23k. Patient was found to have SANTOS. Had a febrile episode to 102F on 9/25. Started on Vancomycin and Meropenem. Blood cultures 2 of 4 bottles with Gram positive cocci. TTE 9/26 revealed large mobile lesion on bioprosthetic MVR, likely suggestive of vegetation. CT surgery consulted for evaluation of bioprosthetic MVR lesion.     Patient denies acute pain with radiating or aggravating factors.  He denies chest pain, shortness of breath, palpitations, headache, dizziness, nausea, or vomiting.     Review of systems:  Review of Systems  CONSTITUTIONAL:  Fevers[ x] chills[x ] sweats[ ] fatigue[ x] weight loss[ ] weight gain [ ]                                     NEGATIVE [ ]   NEURO:  parathesias[ ] seizures [ ]  syncope [ ]  confusion [ ]                                                                                NEGATIVE[x ]   EYES: glasses[ ]  blurry vision[ ]  discharge[ ] pain[ ] glaucoma [ ]                                                                          NEGATIVE[x ]   ENMT:  difficulty hearing [ ]  vertigo[ ]  dysphagia[ ] epistaxis[ ] recent dental work [ ]   hasnt seen a dentist in years                                 NEGATIVE[ ]   CV:  chest pain[ ] palpitations[ ] ZUNIGA [ ] diaphoresis [ ]                                                                                           NEGATIVE[x ]   RESPIRATORY:  wheezing[ ] SOB[ ] cough [ ] sputum[ ] hemoptysis[ ]                                                                  NEGATIVE[x ]   GI:  nausea[x ]  vommiting [ ]  diarrhea[ ] constipation [ ] melena [ ]                                                                      NEGATIVE[ ]   : hematuria[ ]  dysuria[ ] urgency[ ] incontinence[ ]                                                                                            NEGATIVE[ x]   MUSKULOSKELETAL:  arthritis[ ]  joint swelling [ ] muscle weakness [ ]                                                                NEGATIVE[x ]   SKIN:  rash[ ] itching [ ]  hair loss[ ]                                                                                     NEGATIVE[x ]   PSYCH:  dementia [ ] depresion [ ] anxiety[ ]                                                                                                               NEGATIVE[x ]   HEME/LYMPH:  bruises easily[ ] enlarged lymph nodes[ ] tender lymph nodes[ ]                                               NEGATIVE[ x]   ENDOCRINE:  cold intolerance[ ] heat intolerance[ ] polydipsia[ ]                                                                          NEGATIVE[x ]     Past Medical/Surgical History:  CHF (congestive heart failure)    CVA (cerebral vascular accident)    Seizures  last seizure 10 years ago    HTN (hypertension)    Hyperlipemia    COVID-19  october 2020    Atrial fibrillation    Former smoker    History of cocaine use  remote hx, currently sober    H/O aortic dissection  s/p repair (2010), surgery complicated by bowel ischemia s/p bowel resection and ostomy with reversal    H/O aortic valve insufficiency    Mitral regurgitation    GIB (gastrointestinal bleeding)    CAD (coronary artery disease)  s/p PCI 1998  and CABG x 2 11/2020    Chronic atrial fibrillation    H/O colectomy    Status post double vessel coronary artery bypass  11/2020 Dr Butler    S/P AVR (aortic valve replacement)  (t)AVR 11/2020 Dr Butler    S/P MVR (mitral valve replacement)  (t)MVR 11/2020 Dr Butler    H/O aortic dissection  Type A; emergent repair 2010    AV fistula  LUE          Home Medications:  MEDICATIONS  (PRN):  acetaminophen     Tablet .. 650 milliGRAM(s) Oral every 6 hours PRN Temp greater or equal to 38C (100.4F), Mild Pain (1 - 3)  aluminum hydroxide/magnesium hydroxide/simethicone Suspension 30 milliLiter(s) Oral every 4 hours PRN Dyspepsia  melatonin 3 milliGRAM(s) Oral at bedtime PRN Insomnia  ondansetron Injectable 4 milliGRAM(s) IV Push every 8 hours PRN Nausea and/or Vomiting      Standing Medications:  MEDICATIONS  (STANDING):  amLODIPine   Tablet 10 milliGRAM(s) Oral daily  apixaban 5 milliGRAM(s) Oral two times a day  atorvastatin 40 milliGRAM(s) Oral at bedtime  carvedilol 6.25 milliGRAM(s) Oral every 12 hours  levETIRAcetam 1000 milliGRAM(s) Oral two times a day  sodium bicarbonate  Infusion 0.081 mEq/kG/Hr (83 mL/Hr) IV Continuous <Continuous>  tamsulosin 0.4 milliGRAM(s) Oral at bedtime  vancomycin  IVPB 1000 milliGRAM(s) IV Intermittent every 24 hours      PRN Medications:  MEDICATIONS  (PRN):  acetaminophen     Tablet .. 650 milliGRAM(s) Oral every 6 hours PRN Temp greater or equal to 38C (100.4F), Mild Pain (1 - 3)  aluminum hydroxide/magnesium hydroxide/simethicone Suspension 30 milliLiter(s) Oral every 4 hours PRN Dyspepsia  melatonin 3 milliGRAM(s) Oral at bedtime PRN Insomnia  ondansetron Injectable 4 milliGRAM(s) IV Push every 8 hours PRN Nausea and/or Vomiting      Anti-coagulation:  Anti-platelet-                Last Dose:  Coumadin-                                                                 Last Dose:  NOAC- Eliquis                                             Last Dose:    Allergies:  penicillin (Other)      Social History:  Smoking          -Former/Active smoker, smoked ciagrettes/cigars ___ PPD x ___ years, quit 13____ years ago    Alcohol  -No active alcohol consumption       Illicit Drug use  -No illicit drug use currently   -Former/ ___cocaine_________ drug use, last used _____years ago_________     Occupation   -Currently unemployed     Residence  Private home alone      ADLs  Drives [yes]    Ambulation   [no assistance]     Stairs in home?  [no]           Physical Exam  T(F): 97.6 (09-26-23 @ 16:22)  HR: 63 (09-26-23 @ 18:14)  BP: 123/68 (09-26-23 @ 18:14)  RR: 18 (09-26-23 @ 16:22)  SpO2: 96% (09-26-23 @ 16:22)    General: Well nourished, well developed, no acute distress.                                                         Neuro: Normal exam oriented to person/place & time with no focal motor or sensory  deficits.                    Neck: no JVD noted  Chest: Normal lung exam with good air movement absence of wheezes, rales, or rhonchi                                                                         CV:  Auscultation: Irregularly irregular   Carotids: No Bruits appreciated                                                                  GI: soft, NT, ND, normoactive bowel sounds                                                                                             Extremities: noedema b/l lower extremities   Lower Extremity Pulses: + DP/ PT                            11.6   18.81 )-----------( 102      ( 26 Sep 2023 06:26 )             35.3     09-26    131<L>  |  97  |  70.4<H>  ----------------------------<  127<H>  3.5   |  21.0<L>  |  2.27<H>    Ca    8.6      26 Sep 2023 10:50  Mg     2.0     09-26        Urinalysis Basic - ( 26 Sep 2023 10:50 )    Color: x / Appearance: x / SG: x / pH: x  Gluc: 127 mg/dL / Ketone: x  / Bili: x / Urobili: x   Blood: x / Protein: x / Nitrite: x   Leuk Esterase: x / RBC: x / WBC x   Sq Epi: x / Non Sq Epi: x / Bacteria: x      CARDIAC MARKERS ( 25 Sep 2023 05:50 )  x     / x     / 67 U/L / x     / x          Imaging      TTE-  < from: TTE Echo Complete w/o Contrast w/ Doppler (09.26.23 @ 14:32) >    Summary:   1. Left ventricular ejection fraction, by visual estimation, is 50 to   55%.   2. Normal global left ventricular systolic function.   3. Diastolic function indeterminate.   4. Abnormal septal motion consistent with post-operative status.   5. Normal RV size with mildly reduced RV systolic function, inadequate   estimation of RVSP.   6. Bioprosthetic mitral valve present with a large mobile lesion (at   least 2.0 cm x 1.5 cm) prolapsing into and out of the LV-LA cavity,   hightly suggestive of vegetation. Mean transmitral valve gradient 4.8   mmHg (at HR of 60 bpm).   7. Bioprosthesis in the aortic position, mean gradient of 9 mmHg.   8. There is no evidence of pericardial effusion.   9. Compared to the prior TTE study from 5/23/23, lesion over bioMVR is   again noted and is now much larger and recommend MUSTAPHA study to confirm   vegetation and exclude other valvular involvement.    Juan Gomez DO Electronically signed on 9/26/2023 at 4:23:59 PM        *** Final ***    < end of copied text >      MUSTAPHA- pending tomorrow 9/27

## 2023-09-26 NOTE — CONSULT NOTE ADULT - PROBLEM SELECTOR RECOMMENDATION 9
- pt w/ hx of MVR/AVR   - blood culture positive w/ gram + cocci   - pt febrile meeting SIRS criteria   - will get MUSTAPHA as per ID request r/o endocarditis  - keep NPO @ [facility]

## 2023-09-26 NOTE — PROGRESS NOTE ADULT - SUBJECTIVE AND OBJECTIVE BOX
JONATHAN GIBSON    [id number]    63y      Male    CC: Nausea/ vomiting, SANTOS   tolerating regular diet     INTERVAL HPI/OVERNIGHT EVENTS: Has episodes of shivering aborted with tylenol, afebrile during this episodes.     REVIEW OF SYSTEMS:    CONSTITUTIONAL: No fever, weight loss, or fatigue  RESPIRATORY: mild cough, no wheezing,No shortness of breath  CARDIOVASCULAR: No chest pain, palpitations  GASTROINTESTINAL: No abdominal or epigastric pain.      Vital Signs Last 24 Hrs  T(C): 36.6 (26 Sep 2023 11:33), Max: 36.9 (25 Sep 2023 17:25)  T(F): 97.8 (26 Sep 2023 11:33), Max: 98.4 (25 Sep 2023 17:25)  HR: 65 (26 Sep 2023 11:33) (65 - 91)  BP: 98/61 (26 Sep 2023 11:33) (98/61 - 136/81)  BP(mean): --  RR: 18 (26 Sep 2023 11:33) (18 - 18)  SpO2: 97% (26 Sep 2023 11:33) (95% - 97%)    Parameters below as of 26 Sep 2023 04:56  Patient On (Oxygen Delivery Method): room air        PHYSICAL EXAM:    GENERAL: NAD, well-groomed  HEENT: PERRL, +EOMI  NECK: soft, Supple, No JVD,   CHEST/LUNG: Clear to percussion bilaterally; No wheezing  HEART: S1S2+, Regular rate and rhythm; No murmurs  ABDOMEN: Soft, Nontender, Nondistended; Bowel sounds present. large midline healed scar uneven on anterior abdominal wall.   EXTREMITIES:   No clubbing, cyanosis, or edema  SKIN: No rashes or lesions  NEURO: AAOX3, no focal deficits            LABS:                                                11.6   18.81 )-----------( 102      ( 26 Sep 2023 06:26 )             35.3   09-26    131<L>  |  97  |  70.4<H>  ----------------------------<  127<H>  3.5   |  21.0<L>  |  2.27<H>    Ca    8.6      26 Sep 2023 10:50  Mg     2.0     09-26        Culture - Stool (collected 25 Sep 2023 06:50)  Source: .Stool Feces  Preliminary Report (26 Sep 2023 07:34):    No enteric pathogens to date: Final culture pending    Culture - Blood (collected 25 Sep 2023 05:50)  Source: .Blood Blood-Peripheral  Gram Stain (26 Sep 2023 14:19):    Growth in aerobic bottle: Gram Positive Cocci in Clusters    Growth in anaerobic bottle: Gram Positive Cocci in Clusters  Preliminary Report (26 Sep 2023 14:19):    Growth in aerobic bottle: Gram Positive Cocci in Clusters    Growth in anaerobic bottle: Gram Positive Cocci in Clusters    Culture - Blood (collected 25 Sep 2023 05:13)  Source: .Blood Blood-Peripheral  Preliminary Report (26 Sep 2023 13:02):    No growth at 24 hours    Culture - Urine (collected 25 Sep 2023 01:00)  Source: Clean Catch Clean Catch (Midstream)  Final Report (26 Sep 2023 07:28):    <10,000 CFU/mL Normal Urogenital Sivan    Culture - Blood (collected 23 Sep 2023 19:30)  Source: .Blood Blood-Peripheral  Gram Stain (25 Sep 2023 21:07):    Growth in aerobic bottle: Gram Positive Cocci in Clusters    Growth in anaerobic bottle: Gram Positive Cocci in Clusters  Preliminary Report (26 Sep 2023 14:36):    Growth in aerobic and anaerobic bottles: Staphylococcus epidermidis    "Susceptibilities not performed"    Culture - Blood (collected 23 Sep 2023 19:25)  Source: .Blood Blood-Peripheral  Gram Stain (25 Sep 2023 09:15):    Growth in aerobic bottle: Gram Positive Cocci in Clusters  Preliminary Report (26 Sep 2023 12:43):    Growth in aerobic bottle: Staphylococcus epidermidis    Direct identification is available within approximately 3-5    hours either by Blood Panel Multiplexed PCR or Direct    MALDI-TOF. Details: [email or web address]  Organism: Blood Culture PCR (25 Sep 2023 11:14)  Organism: Blood Culture PCR (25 Sep 2023 11:14)          MEDICATIONS  (STANDING):  amLODIPine   Tablet 10 milliGRAM(s) Oral daily  apixaban 5 milliGRAM(s) Oral two times a day  atorvastatin 40 milliGRAM(s) Oral at bedtime  carvedilol 6.25 milliGRAM(s) Oral every 12 hours  levETIRAcetam 1000 milliGRAM(s) Oral two times a day  sodium bicarbonate  Infusion 0.081 mEq/kG/Hr (83 mL/Hr) IV Continuous <Continuous>  tamsulosin 0.4 milliGRAM(s) Oral at bedtime  vancomycin  IVPB 1000 milliGRAM(s) IV Intermittent every 24 hours    MEDICATIONS  (PRN):  acetaminophen     Tablet .. 650 milliGRAM(s) Oral every 6 hours PRN Temp greater or equal to 38C (100.4F), Mild Pain (1 - 3)  aluminum hydroxide/magnesium hydroxide/simethicone Suspension 30 milliLiter(s) Oral every 4 hours PRN Dyspepsia  melatonin 3 milliGRAM(s) Oral at bedtime PRN Insomnia  ondansetron Injectable 4 milliGRAM(s) IV Push every 8 hours PRN Nausea and/or Vomiting               RADIOLOGY & ADDITIONAL TESTS:    < from: CT Abdomen and Pelvis No Cont (09.23.23 @ 14:55) >  IMPRESSION:  No CT evidence of bowel obstruction or other acute intra-abdominal   process.    < end of copied text >    - MUSTAPHA 8/14/23 showed EF 55-60%

## 2023-09-26 NOTE — CONSULT NOTE ADULT - SUBJECTIVE AND OBJECTIVE BOX
[facility]                                              CARDIOLOGY AT [address], [place] [address]                                             Telephone: [phone]. Fax:[phone]                                                       CARDIOLOGY CONSULTATION NOTE                                                                                             History obtained by: Patient and medical record   Community Cardiologist: Jostin   obtained: Yes [  ] No [ x ]  Reason for Consultation: Bacteremia  Available out pt records reviewed: Yes [  ] No [ x ]    Chief complaint:    Patient is a 63y old  Male who presents with a chief complaint of Intractable N/V, SANTOS (26 Sep 2023 11:24)      HPI:  Mr. Cummins is a 62 yo M with a PMH significant for HTN, HLD, Type A dissection / repeat sternotomy, and CABG x2, CHF (mixed systolic / diastolic), mitral / aortic valve replacement, CKD (with short term on HD 2022), A.fib s/p ablation pending watchman, SDH (resolved as of July imaging) who presents to [facility] for ~ 2 weeks of inability to tolerate oral intake. Patient states that over the last 2 weeks he has progressively lost the ability to stomach foods, beginning with solids and now also involving liquids when he takes in too much. He states he has been to 2 car shows within the last 2 weeks (both on Sundays) and noticed the symptoms begin the day after the first car show on 9/11. 10-15 minutes after intaking food or large amounts of liquids he will feel the need to vomit and after vomiting he will feel better. The emesis is NBNB only containing what he ate. Due to this he has been unable to tolerate his usual medicines including his blood thinner. He continues to have bowel movements and pass gas, and feels well if he doesn't try to ingest anything. His only remaining symptom was mild SOB on exertion last night and this morning. He denies fevers/chills, dysuria/ anuria, constipation / diarrhea, recent sick contacts or changes to medications.   In ED patients vitals were wnl. Lab work showed hyponatremia of 127, with hypochloremia of 91, bicarb of 14, BUN 71.4, and Cr 3.5 as well as elevated WBC to 23.94 and elevated Trp to .09 with BNP 4805. Imaging with CT Abd/pelvis showed no acute pathology. Given 1L LR in ED, to be continued with NS at 50 cc'hr given likely dehydrated status, Nephrology and Cardiology consulted in ED. Patient to be admitted to medical floors for further monitoring.  (23 Sep 2023 17:52)    PAST MEDICAL HISTORY  CHF (congestive heart failure)    CVA (cerebral vascular accident)    Chronic kidney disease    Seizures    HTN (hypertension)    Hyperlipemia    COVID-19    Atrial fibrillation    ESRD on dialysis    Former smoker    History of cocaine use    H/O aortic dissection    H/O aortic valve insufficiency    Mitral regurgitation    GIB (gastrointestinal bleeding)    CAD (coronary artery disease)    Chronic atrial fibrillation    PAST SURGICAL HISTORY  Aorta disorder    H/O colectomy    Status post double vessel coronary artery bypass    S/P AVR (aortic valve replacement)    S/P MVR (mitral valve replacement)    H/O aortic dissection    AV fistula    SUBSTANCE USE HISTORY  Denies current and previous substance use [  ]   CIGARETTES -   ALCOHOL -   DRUGS -     FAMILY HISTORY:  FH: hypertension    Family history of cardiac disorder (Mother)  mother    CARDIAC SPECIFIC FAMILY HX   No KNOWN family history of Cardiovascular disease, CAD, or sudden death in first degree relatives unless specified below  Family History of Cardiovascular Disease:  [  ]   Coronary Artery Disease in first degree relative:  [  ]   Sudden Cardiac Death in First degree relative: [  ]    HOME MEDICATIONS:  atorvastatin 40 mg oral tablet: 1 orally once a day (14 Aug 2023 08:35)  carvedilol 6.25 mg oral tablet: 1 orally 2 times a day (14 Aug 2023 08:34)  Eliquis 5 mg oral tablet: 1 orally once a day (14 Aug 2023 08:33)  oyster shell 500mg BID:  (14 Aug 2023 08:35)  tamsulosin 0.4 mg oral capsule: 1 cap(s) orally once a day (at bedtime) (14 Aug 2023 08:32)    CURRENT CARDIAC MEDICATIONS:  amLODIPine   Tablet 10 milliGRAM(s) Oral daily  carvedilol 6.25 milliGRAM(s) Oral every 12 hours    CURRENT OTHER MEDICATIONS:  acetaminophen     Tablet .. 650 milliGRAM(s) Oral every 6 hours PRN Temp greater or equal to 38C (100.4F), Mild Pain (1 - 3)  levETIRAcetam 1000 milliGRAM(s) Oral two times a day  melatonin 3 milliGRAM(s) Oral at bedtime PRN Insomnia  ondansetron Injectable 4 milliGRAM(s) IV Push every 8 hours PRN Nausea and/or Vomiting  aluminum hydroxide/magnesium hydroxide/simethicone Suspension 30 milliLiter(s) Oral every 4 hours PRN Dyspepsia  apixaban 5 milliGRAM(s) Oral two times a day  atorvastatin 40 milliGRAM(s) Oral at bedtime  meropenem Injectable      meropenem Injectable 1000 milliGRAM(s) IV Push every 12 hours, Stop order after: 7 Days  potassium chloride    Tablet ER 20 milliEquivalent(s) Oral every 2 hours, Stop order after: 2 Doses  sodium bicarbonate  Infusion 0.081 mEq/kG/Hr (83 mL/Hr) IV Continuous <Continuous>, Stop order after: 48 Hours  tamsulosin 0.4 milliGRAM(s) Oral at bedtime      ALLERGIES:   penicillin (Other)      VITAL SIGNS:  T(C): 36.6 (09-26-23 @ 11:33), Max: 36.9 (09-25-23 @ 17:25)  T(F): 97.8 (09-26-23 @ 11:33), Max: 98.4 (09-25-23 @ 17:25)  HR: 65 (09-26-23 @ 11:33) (60 - 91)  BP: 98/61 (09-26-23 @ 11:33) (98/61 - 136/81)  RR: 18 (09-26-23 @ 11:33) (18 - 18)  SpO2: 97% (09-26-23 @ 11:33) (95% - 97%)    INTAKE AND OUTPUT:       LABS:  ( 25 Sep 2023 05:50 )  Troponin T  X    ,  CPK  67   , CKMB  X    , BNP X        , ( 23 Sep 2023 16:06 )  Troponin T  0.08<H>,  CPK  X    , CKMB  X    , BNP X        , ( 23 Sep 2023 12:15 )  Troponin T  0.09<H>,  CPK  X    , CKMB  X    , BNP X                     11.6   18.81 )-----------( 102      ( 26 Sep 2023 06:26 )             35.3     09-26    131<L>  |  97  |  70.4<H>  ----------------------------<  127<H>  3.5   |  21.0<L>  |  2.27<H>    Ca    8.6      26 Sep 2023 10:50  Mg     2.0     09-26    Urinalysis Basic - ( 26 Sep 2023 10:50 )    Color: x / Appearance: x / SG: x / pH: x  Gluc: 127 mg/dL / Ketone: x  / Bili: x / Urobili: x   Blood: x / Protein: x / Nitrite: x   Leuk Esterase: x / RBC: x / WBC x   Sq Epi: x / Non Sq Epi: x / Bacteria: x    RADIOLOGY IMAGING:   TTE Echo Complete w/o Contrast w/ Doppler:   Transport: Stretcher-Crib  Monitor: w/ Monitor  Provider's Contact #: [phone] (09-25-23 @ 15:13) [Ordered.]

## 2023-09-26 NOTE — CONSULT NOTE ADULT - PROBLEM SELECTOR RECOMMENDATION 9
Pt with mitral/aortic valve replacement 2020 with Dr. Butler.  TTE 9/26 with large mobile lesion on bioprosthetic MVR, highly suggestive of vegetation.   MUSTAPHA ordered for tomorrow to rule out endocarditis, will follow up results.   Cardiac surgery to follow.   Plan to be discussed with Dr. Butler Pt S/P reopsternotomy mitral/aortic valve replacement, CABG x2 2020 and with Dr. Butler. Previous type A repair at [place] 2010  TTE 9/26 with large mobile lesion on bioprosthetic MVR, highly suggestive of vegetation.   MUSTAPHA ordered for tomorrow to rule out endocarditis, will follow up results.   Cardiac surgery to follow.   Plan to be discussed with Dr. Butler

## 2023-09-26 NOTE — PROGRESS NOTE ADULT - SUBJECTIVE AND OBJECTIVE BOX
INFECTIOUS DISEASES AND INTERNAL MEDICINE at [place]  =======================================================  Johnny Catalan MD  Diplomates American Board of Internal Medicine and Infectious Diseases  Telephone [phone]  Fax            [phone]  =======================================================    JONATHAN GIBSON [id number]    Follow up:    Allergies:  penicillin (Other)      Medications:  acetaminophen     Tablet .. 650 milliGRAM(s) Oral every 6 hours PRN  aluminum hydroxide/magnesium hydroxide/simethicone Suspension 30 milliLiter(s) Oral every 4 hours PRN  amLODIPine   Tablet 10 milliGRAM(s) Oral daily  apixaban 5 milliGRAM(s) Oral two times a day  atorvastatin 40 milliGRAM(s) Oral at bedtime  carvedilol 6.25 milliGRAM(s) Oral every 12 hours  levETIRAcetam 1000 milliGRAM(s) Oral two times a day  melatonin 3 milliGRAM(s) Oral at bedtime PRN  meropenem Injectable      meropenem Injectable 1000 milliGRAM(s) IV Push every 12 hours  ondansetron Injectable 4 milliGRAM(s) IV Push every 8 hours PRN  sodium bicarbonate  Infusion 0.081 mEq/kG/Hr IV Continuous <Continuous>  tamsulosin 0.4 milliGRAM(s) Oral at bedtime    SOCIAL       FAMILY   FAMILY HISTORY:  FH: hypertension    Family history of cardiac disorder (Mother)  mother      REVIEW OF SYSTEMS:  CONSTITUTIONAL:  No Fever or chills  HEENT:   No diplopia or blurred vision.  No earache, sore throat or runny nose.  CARDIOVASCULAR:  No pressure, squeezing, strangling, tightness, heaviness or aching about the chest, neck, axilla or epigastrium.  RESPIRATORY:  No cough, shortness of breath, PND or orthopnea.  GASTROINTESTINAL:  No nausea, vomiting or diarrhea.  GENITOURINARY:  No dysuria, frequency or urgency. No Blood in urine  MUSCULOSKELETAL:   moves all joints  SKIN:  No change in skin, hair or nails.  NEUROLOGIC:  No paresthesias, fasciculations, seizures or weakness.  PSYCHIATRIC:  No disorder of thought or mood.  ENDOCRINE:  No heat or cold intolerance, polyuria or polydipsia.  HEMATOLOGICAL:  No easy bruising or bleeding.            Physical Exam:  ICU Vital Signs Last 24 Hrs  T(C): 36.6 (26 Sep 2023 11:33), Max: 36.9 (25 Sep 2023 17:25)  T(F): 97.8 (26 Sep 2023 11:33), Max: 98.4 (25 Sep 2023 17:25)  HR: 65 (26 Sep 2023 11:33) (60 - 91)  BP: 98/61 (26 Sep 2023 11:33) (98/61 - 136/81)  BP(mean): --  ABP: --  ABP(mean): --  RR: 18 (26 Sep 2023 11:33) (18 - 18)  SpO2: 97% (26 Sep 2023 11:33) (95% - 97%)    O2 Parameters below as of 26 Sep 2023 04:56  Patient On (Oxygen Delivery Method): room air          GEN: NAD,   HEENT: normocephalic and atraumatic. EOMI. KIKO.    NECK: Supple. No carotid bruits.  No lymphadenopathy or thyromegaly.  LUNGS: Clear to auscultation.  HEART: Regular rate and rhythm without murmur.  ABDOMEN: Soft, nontender, and nondistended.  Positive bowel sounds.    : No CVA tenderness  EXTREMITIES: Without any cyanosis, clubbing, rash, lesions or edema.  MSK: no joint swelling  NEUROLOGIC: Cranial nerves II through XII are grossly intact.  PSYCHIATRIC: Appropriate affect .  SKIN: No ulceration or induration present.        Labs:  Vitals:  ============  T(F): 97.8 (26 Sep 2023 11:33), Max: 98.4 (25 Sep 2023 17:25)  HR: 65 (26 Sep 2023 11:33)  BP: 98/61 (26 Sep 2023 11:33)  RR: 18 (26 Sep 2023 11:33)  SpO2: 97% (26 Sep 2023 11:33) (95% - 97%)  temp max in last 48H T(F): , Max: 102.2 (09-25-23 @ 05:05)    =======================================================  Current Antibiotics:  meropenem Injectable      meropenem Injectable 1000 milliGRAM(s) IV Push every 12 hours    Other medications:  amLODIPine   Tablet 10 milliGRAM(s) Oral daily  apixaban 5 milliGRAM(s) Oral two times a day  atorvastatin 40 milliGRAM(s) Oral at bedtime  carvedilol 6.25 milliGRAM(s) Oral every 12 hours  levETIRAcetam 1000 milliGRAM(s) Oral two times a day  sodium bicarbonate  Infusion 0.081 mEq/kG/Hr IV Continuous <Continuous>  tamsulosin 0.4 milliGRAM(s) Oral at bedtime      =======================================================  Labs:                        11.6   18.81 )-----------( 102      ( 26 Sep 2023 06:26 )             35.3     09-26    131<L>  |  97  |  70.4<H>  ----------------------------<  127<H>  3.5   |  21.0<L>  |  2.27<H>    Ca    8.6      26 Sep 2023 10:50  Mg     2.0     09-26        Culture - Stool (collected 09-25-23 @ 06:50)  Source: .Stool Feces    Culture - Urine (collected 09-25-23 @ 01:00)  Source: Clean Catch Clean Catch (Midstream)  Final Report (09-26-23 @ 07:28):    <10,000 CFU/mL Normal Urogenital Sivan    Culture - Blood (collected 09-23-23 @ 19:30)  Source: .Blood Blood-Peripheral  Gram Stain (09-25-23 @ 21:07):    Growth in aerobic bottle: Gram Positive Cocci in Clusters    Growth in anaerobic bottle: Gram Positive Cocci in Clusters    Culture - Blood (collected 09-23-23 @ 19:25)  Source: .Blood Blood-Peripheral  Gram Stain (09-25-23 @ 09:15):    Growth in aerobic bottle: Gram Positive Cocci in Clusters  Organism: Blood Culture PCR (09-25-23 @ 11:14)  Organism: Blood Culture PCR (09-25-23 @ 11:14)    Sensitivities:      Method Type: PCR      -  Staphylococcus epidermidis: Detec      Creatinine: 2.27 mg/dL (09-26-23 @ 10:50)  Creatinine: 1.79 mg/dL (09-26-23 @ 06:26)  Creatinine: 2.79 mg/dL (09-25-23 @ 05:50)  Creatinine: 2.89 mg/dL (09-24-23 @ 05:46)  Creatinine: 3.50 mg/dL (09-23-23 @ 12:15)    Procalcitonin, Serum: 1.75 ng/mL (09-25-23 @ 05:50)          WBC Count: 18.81 K/uL (09-26-23 @ 06:26)  WBC Count: 18.52 K/uL (09-25-23 @ 05:50)  WBC Count: 23.28 K/uL (09-24-23 @ 05:46)  WBC Count: 23.94 K/uL (09-23-23 @ 12:15)    SARS-CoV-2: NotDetec (09-25-23 @ 06:00)      Alkaline Phosphatase: 125 U/L (09-24-23 @ 05:46)  Alkaline Phosphatase: 178 U/L (09-23-23 @ 12:15)  Alanine Aminotransferase (ALT/SGPT): 21 U/L (09-24-23 @ 05:46)  Alanine Aminotransferase (ALT/SGPT): 35 U/L (09-23-23 @ 12:15)  Aspartate Aminotransferase (AST/SGOT): 14 U/L (09-24-23 @ 05:46)  Aspartate Aminotransferase (AST/SGOT): 29 U/L (09-23-23 @ 12:15)  Bilirubin Total: 0.7 mg/dL (09-24-23 @ 05:46)  Bilirubin Total: 0.8 mg/dL (09-23-23 @ 12:15)

## 2023-09-26 NOTE — PROGRESS NOTE ADULT - SUBJECTIVE AND OBJECTIVE BOX
NEPHROLOGY INTERVAL HPI/OVERNIGHT EVENTS:    Gram (+) cocci in blood cultures 9/23 ---> 4/4 ----> staph epi on PCR   T max 102.2 , now afebrile  Remains on IV Bicarb  Labs repeated this am   ID follow up noted   WBC down to 18 K   CXR OK 9/25   L arm AVG doppler ---> Confirms occluded                 MEDICATIONS  (STANDING):  acetaminophen   IVPB .. 1000 milliGRAM(s) IV Intermittent once  amLODIPine   Tablet 10 milliGRAM(s) Oral daily  apixaban 5 milliGRAM(s) Oral two times a day  atorvastatin 40 milliGRAM(s) Oral at bedtime  carvedilol 6.25 milliGRAM(s) Oral every 12 hours  levETIRAcetam 1000 milliGRAM(s) Oral two times a day  meropenem Injectable      meropenem Injectable 1000 milliGRAM(s) IV Push every 12 hours  potassium chloride    Tablet ER 20 milliEquivalent(s) Oral every 2 hours  sodium bicarbonate  Infusion 0.081 mEq/kG/Hr (83 mL/Hr) IV Continuous <Continuous>  tamsulosin 0.4 milliGRAM(s) Oral at bedtime    MEDICATIONS  (PRN):  acetaminophen     Tablet .. 650 milliGRAM(s) Oral every 6 hours PRN Temp greater or equal to 38C (100.4F), Mild Pain (1 - 3)  aluminum hydroxide/magnesium hydroxide/simethicone Suspension 30 milliLiter(s) Oral every 4 hours PRN Dyspepsia  melatonin 3 milliGRAM(s) Oral at bedtime PRN Insomnia  ondansetron Injectable 4 milliGRAM(s) IV Push every 8 hours PRN Nausea and/or Vomiting      Allergies    penicillin (Other)    Intolerances            Vital Signs Last 24 Hrs  T(C): 36.7 (26 Sep 2023 04:56), Max: 36.9 (25 Sep 2023 17:25)  T(F): 98 (26 Sep 2023 04:56), Max: 98.4 (25 Sep 2023 17:25)  HR: 80 (26 Sep 2023 04:56) (57 - 91)  BP: 126/77 (26 Sep 2023 04:56) (96/60 - 136/81)  BP(mean): 6 (25 Sep 2023 12:24) (6 - 6)  RR: 18 (26 Sep 2023 04:56) (18 - 18)  SpO2: 97% (26 Sep 2023 04:56) (95% - 97%)    Parameters below as of 26 Sep 2023 04:56  Patient On (Oxygen Delivery Method): room air      Daily     Daily   I&O's Detail    I&O's Summary      PHYSICAL EXAM:    GENERAL: NAD, Laying flat   HEAD:  Atraumatic, Normocephalic, dry MM   NERVOUS SYSTEM:  Alert & Oriented X3,  CHEST/LUNG: Clear / EAE   HEART: Regular rate and rhythm; No gallop or rub   ABDOMEN: Soft, Nontender , no rigidity or rebound   EXTREMITIES:  No edema , thrombosed AVG in L arm     LABS:                        11.6   18.81 )-----------( 102      ( 26 Sep 2023 06:26 )             35.3     09-26    131<L>  |  97  |  70.4<H>  ----------------------------<  127<H>  3.5   |  21.0<L>  |  2.27<H>    Ca    8.6      26 Sep 2023 10:50  Mg     2.0     09-26        Urinalysis Basic - ( 26 Sep 2023 10:50 )    Color: x / Appearance: x / SG: x / pH: x  Gluc: 127 mg/dL / Ketone: x  / Bili: x / Urobili: x   Blood: x / Protein: x / Nitrite: x   Leuk Esterase: x / RBC: x / WBC x   Sq Epi: x / Non Sq Epi: x / Bacteria: x      Magnesium: 2.0 mg/dL (09-26 @ 06:26)      < from: Xray Chest 1 View- PORTABLE-Urgent (Xray Chest 1 View- PORTABLE-Urgent .) (09.25.23 @ 06:16) >    ACC: [id number] EXAM:  XR CHEST PORTABLE URGENT 1V   ORDERED BY: JENY HARVEY     PROCEDURE DATE:  09/25/2023          INTERPRETATION:  TECHNIQUE: Single portable view of the chest.    COMPARISON:  9/23/2023    CLINICAL HISTORY: Fever    FINDINGS:    Single frontal view of the chest demonstrates the lungs to be clear. The   cardiomediastinal silhouette is normal. No acute osseous abnormalities.   Overlying EKG leads and wires are noted. Mediastinal sternotomy wires.    IMPRESSION: No acute cardiopulmonary disease process.    --- End of Report ---            WINTER REYES MD; Attending Radiologist  This document     < end of copied text >      RADIOLOGY & ADDITIONAL TESTS:

## 2023-09-26 NOTE — CONSULT NOTE ADULT - ASSESSMENT
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 Patient presented to [facility] for 2 weeks of inability to tolerate oral intake. Denies any fever or chills. Has chronic diarrhea due to bowel resection. In the ER patient was afebrile. Found to have leukocytosis to 23k. Patient was found to have SANTOS. Had a febrile episode to 102F on 9/25. Started on Vancomycin and Meropenem. Blood cultures 2 of 4 bottles with GPC.

## 2023-09-26 NOTE — PROGRESS NOTE ADULT - ASSESSMENT
Improving  SANTOS on CKD - Baseline 1.6 ---> H/O temporary HD ending in Nov 2021 --> was on HD x 1 year   Presented with GI losses  ? Viral enteritis   Recent ECHO and MUSTAPHA noted   Torsemide held ( h/o HFpEF )   No hydro on CT 9/23   CPK 67   UDS negative   Thrombosed AVG in L arm , confirmed w/ doppler     MA - Added Bicarb to IVF     Microscopic hematuria , seen on UA x 2   No stones or bladder lesions seen on CT   Repeat UA / Check culture   Eventual Urology evaluation   TTE ordered     Bacteremia - Cultures 9/23 noted   Urine / stool cultures OK   Abx as per ID   Watch Vanco levels

## 2023-09-27 DIAGNOSIS — I33.0 ACUTE AND SUBACUTE INFECTIVE ENDOCARDITIS: ICD-10-CM

## 2023-09-27 LAB
-  AMPICILLIN/SULBACTAM: SIGNIFICANT CHANGE UP
-  CEFAZOLIN: SIGNIFICANT CHANGE UP
-  CLINDAMYCIN: SIGNIFICANT CHANGE UP
-  ERYTHROMYCIN: SIGNIFICANT CHANGE UP
-  GENTAMICIN: SIGNIFICANT CHANGE UP
-  OXACILLIN: SIGNIFICANT CHANGE UP
-  PENICILLIN: SIGNIFICANT CHANGE UP
-  RIFAMPIN: SIGNIFICANT CHANGE UP
-  TETRACYCLINE: SIGNIFICANT CHANGE UP
-  TRIMETHOPRIM/SULFAMETHOXAZOLE: SIGNIFICANT CHANGE UP
-  VANCOMYCIN: SIGNIFICANT CHANGE UP
ANION GAP SERPL CALC-SCNC: 14 MMOL/L — SIGNIFICANT CHANGE UP (ref 5–17)
BUN SERPL-MCNC: 71.3 MG/DL — HIGH (ref 8–20)
CALCIUM SERPL-MCNC: 7.9 MG/DL — LOW (ref 8.4–10.5)
CHLORIDE SERPL-SCNC: 97 MMOL/L — SIGNIFICANT CHANGE UP (ref 96–108)
CO2 SERPL-SCNC: 20 MMOL/L — LOW (ref 22–29)
CREAT SERPL-MCNC: 2.24 MG/DL — HIGH (ref 0.5–1.3)
CULTURE RESULTS: SIGNIFICANT CHANGE UP
EGFR: 32 ML/MIN/1.73M2 — LOW
GLUCOSE SERPL-MCNC: 110 MG/DL — HIGH (ref 70–99)
HCT VFR BLD CALC: 37.8 % — LOW (ref 39–50)
HGB BLD-MCNC: 12 G/DL — LOW (ref 13–17)
MAGNESIUM SERPL-MCNC: 2.2 MG/DL — SIGNIFICANT CHANGE UP (ref 1.6–2.6)
MCHC RBC-ENTMCNC: 27.8 PG — SIGNIFICANT CHANGE UP (ref 27–34)
MCHC RBC-ENTMCNC: 31.7 GM/DL — LOW (ref 32–36)
MCV RBC AUTO: 87.5 FL — SIGNIFICANT CHANGE UP (ref 80–100)
METHOD TYPE: SIGNIFICANT CHANGE UP
PLATELET # BLD AUTO: 131 K/UL — LOW (ref 150–400)
POTASSIUM SERPL-MCNC: 4.5 MMOL/L — SIGNIFICANT CHANGE UP (ref 3.5–5.3)
POTASSIUM SERPL-SCNC: 4.5 MMOL/L — SIGNIFICANT CHANGE UP (ref 3.5–5.3)
RBC # BLD: 4.32 M/UL — SIGNIFICANT CHANGE UP (ref 4.2–5.8)
RBC # FLD: 13 % — SIGNIFICANT CHANGE UP (ref 10.3–14.5)
SODIUM SERPL-SCNC: 131 MMOL/L — LOW (ref 135–145)
SPECIMEN SOURCE: SIGNIFICANT CHANGE UP
VANCOMYCIN FLD-MCNC: 19.8 UG/ML — SIGNIFICANT CHANGE UP
WBC # BLD: 16.72 K/UL — HIGH (ref 3.8–10.5)
WBC # FLD AUTO: 16.72 K/UL — HIGH (ref 3.8–10.5)

## 2023-09-27 PROCEDURE — 99233 SBSQ HOSP IP/OBS HIGH 50: CPT

## 2023-09-27 PROCEDURE — 99232 SBSQ HOSP IP/OBS MODERATE 35: CPT | Mod: 25

## 2023-09-27 RX ORDER — VANCOMYCIN HCL 1 G
1000 VIAL (EA) INTRAVENOUS
Refills: 0 | Status: DISCONTINUED | OUTPATIENT
Start: 2023-09-28 | End: 2023-09-28

## 2023-09-27 RX ORDER — PHYTONADIONE (VIT K1) 5 MG
5 TABLET ORAL DAILY
Refills: 0 | Status: DISCONTINUED | OUTPATIENT
Start: 2023-09-28 | End: 2023-09-29

## 2023-09-27 RX ADMIN — AMLODIPINE BESYLATE 10 MILLIGRAM(S): 2.5 TABLET ORAL at 05:12

## 2023-09-27 RX ADMIN — Medication 83 MEQ/KG/HR: at 18:19

## 2023-09-27 RX ADMIN — ATORVASTATIN CALCIUM 40 MILLIGRAM(S): 80 TABLET, FILM COATED ORAL at 21:52

## 2023-09-27 RX ADMIN — LEVETIRACETAM 1000 MILLIGRAM(S): 250 TABLET, FILM COATED ORAL at 05:12

## 2023-09-27 RX ADMIN — CARVEDILOL PHOSPHATE 6.25 MILLIGRAM(S): 80 CAPSULE, EXTENDED RELEASE ORAL at 18:24

## 2023-09-27 RX ADMIN — APIXABAN 5 MILLIGRAM(S): 2.5 TABLET, FILM COATED ORAL at 05:12

## 2023-09-27 RX ADMIN — LEVETIRACETAM 1000 MILLIGRAM(S): 250 TABLET, FILM COATED ORAL at 18:23

## 2023-09-27 RX ADMIN — TAMSULOSIN HYDROCHLORIDE 0.4 MILLIGRAM(S): 0.4 CAPSULE ORAL at 21:52

## 2023-09-27 RX ADMIN — CARVEDILOL PHOSPHATE 6.25 MILLIGRAM(S): 80 CAPSULE, EXTENDED RELEASE ORAL at 05:12

## 2023-09-27 NOTE — PROGRESS NOTE ADULT - SUBJECTIVE AND OBJECTIVE BOX
Patient seen and examined    s/p MUSTAPHA,      REVIEW OF SYSTEMS:    CONSTITUTIONAL: No F/C  RESPIRATORY: No cough,  No SOB  CARDIOVASCULAR: No CP/palpitations,    GASTROINTESTINAL: No abdominal pain  or NVD   GENITOURINARY: No UTI sx  NEUROLOGICAL: No headaches, NO wk/numbness  MUSCULOSKELETAL:   EXTREMITIES : no swelling,    Vital Signs Last 24 Hrs  T(C): 36.8 (27 Sep 2023 16:28), Max: 37.2 (27 Sep 2023 04:32)  T(F): 98.3 (27 Sep 2023 16:28), Max: 99 (27 Sep 2023 04:32)  HR: 85 (27 Sep 2023 18:23) (58 - 85)  BP: 117/64 (27 Sep 2023 18:23) (109/55 - 140/61)  BP(mean): --  RR: 18 (27 Sep 2023 16:28) (14 - 18)  SpO2: 100% (27 Sep 2023 16:28) (94% - 100%)    Parameters below as of 27 Sep 2023 13:18  Patient On (Oxygen Delivery Method): room air        PHYSICAL EXAM:    GENERAL: NAD,   EYES:  conjunctiva and sclera clear  NECK: Supple, No JVD/Bruit  NERVOUS SYSTEM:  A/O x3,   CHEST:  No rales or rhonchi  HEART:  RRR, gr 3 murmur  ABDOMEN: Soft, NT/ND BS+  EXTREMITIES:  mild Edema;  SKIN: No rashes    LABS:                          12.0   16.72 )-----------( 131      ( 27 Sep 2023 05:08 )             37.8     09-27    131<L>  |  97  |  71.3<H>  ----------------------------<  110<H>  4.5   |  20.0<L>  |  2.24<H>    Ca    7.9<L>      27 Sep 2023 05:08  Mg     2.2     09-27        MEDICATIONS  (STANDING):  acetaminophen     Tablet .. PRN  aluminum hydroxide/magnesium hydroxide/simethicone Suspension PRN  amLODIPine   Tablet  atorvastatin  carvedilol  levETIRAcetam  melatonin PRN  ondansetron Injectable PRN  sodium bicarbonate  Infusion  tamsulosin

## 2023-09-27 NOTE — PROGRESS NOTE ADULT - SUBJECTIVE AND OBJECTIVE BOX
JONATHAN GIBSON    [id number]    63y      Male    CC: Nausea/ vomiting, SANTOS   Shivering episodes     INTERVAL HPI/OVERNIGHT EVENTS: no acute events.     REVIEW OF SYSTEMS:    CONSTITUTIONAL: No fever, weight loss, or fatigue  RESPIRATORY: mild cough, no wheezing,No shortness of breath  CARDIOVASCULAR: No chest pain, palpitations  GASTROINTESTINAL: No abdominal or epigastric pain.      Vital Signs Last 24 Hrs  T(C): 37.2 (09-27-23 @ 09:55), Max: 37.2 (09-27-23 @ 04:32)  T(F): 98.9 (09-27-23 @ 09:55), Max: 99 (09-27-23 @ 04:32)  HR: 60 (09-27-23 @ 13:18) (58 - 70)  BP: 113/60 (09-27-23 @ 13:18) (109/55 - 140/61)  BP(mean): --  RR: 14 (09-27-23 @ 12:48) (14 - 18)  SpO2: 96% (09-27-23 @ 12:48) (94% - 97%)        PHYSICAL EXAM:    GENERAL: NAD, well-groomed  HEENT: PERRL, +EOMI  NECK: soft, Supple, No JVD,   CHEST/LUNG: Clear to percussion bilaterally; No wheezing  HEART: S1S2+, Regular rate and rhythm; No murmurs  ABDOMEN: Soft, Nontender, Nondistended; Bowel sounds present. large midline healed scar uneven on anterior abdominal wall.   EXTREMITIES:   No clubbing, cyanosis, or edema  SKIN: No rashes or lesions  NEURO: AAOX3, no focal deficits            LABS:                                   12.0   16.72 )-----------( 131      ( 27 Sep 2023 05:08 )             37.8   09-27    131<L>  |  97  |  71.3<H>  ----------------------------<  110<H>  4.5   |  20.0<L>  |  2.24<H>    Ca    7.9<L>      27 Sep 2023 05:08  Mg     2.2     09-27          Culture - Stool (collected 25 Sep 2023 06:50)  Source: .Stool Feces  Final Report (27 Sep 2023 09:40):    No enteric pathogens isolated.    (Stool culture examined for Salmonella,    Shigella, Campylobacter, Aeromonas, Plesiomonas,    Vibrio, E.coli O157 and Yersinia)    Culture - Blood (collected 25 Sep 2023 05:50)  Source: .Blood Blood-Peripheral  Gram Stain (26 Sep 2023 14:19):    Growth in aerobic bottle: Gram Positive Cocci in Clusters    Growth in anaerobic bottle: Gram Positive Cocci in Clusters  Preliminary Report (27 Sep 2023 13:49):    Growth in aerobic bottle: Staphylococcus epidermidis    Susceptibility to follow.    Growth in anaerobic bottle: Gram Positive Cocci in Clusters    Culture - Blood (collected 25 Sep 2023 05:13)  Source: .Blood Blood-Peripheral  Gram Stain (26 Sep 2023 16:33):    Growth in aerobic bottle: Gram Positive Cocci in Clusters  Preliminary Report (27 Sep 2023 12:34):    Growth in aerobic bottle: Staphylococcus epidermidis    "Susceptibilities not performed"    Culture - Urine (collected 25 Sep 2023 01:00)  Source: Clean Catch Clean Catch (Midstream)  Final Report (26 Sep 2023 07:28):    <10,000 CFU/mL Normal Urogenital Sivan        MEDICATIONS  (STANDING):  amLODIPine   Tablet 10 milliGRAM(s) Oral daily  apixaban 5 milliGRAM(s) Oral two times a day  atorvastatin 40 milliGRAM(s) Oral at bedtime  carvedilol 6.25 milliGRAM(s) Oral every 12 hours  levETIRAcetam 1000 milliGRAM(s) Oral two times a day  sodium bicarbonate  Infusion 0.081 mEq/kG/Hr (83 mL/Hr) IV Continuous <Continuous>  tamsulosin 0.4 milliGRAM(s) Oral at bedtime    MEDICATIONS  (PRN):  acetaminophen     Tablet .. 650 milliGRAM(s) Oral every 6 hours PRN Temp greater or equal to 38C (100.4F), Mild Pain (1 - 3)  aluminum hydroxide/magnesium hydroxide/simethicone Suspension 30 milliLiter(s) Oral every 4 hours PRN Dyspepsia  melatonin 3 milliGRAM(s) Oral at bedtime PRN Insomnia  ondansetron Injectable 4 milliGRAM(s) IV Push every 8 hours PRN Nausea and/or Vomiting              RADIOLOGY & ADDITIONAL TESTS:    < from: CT Abdomen and Pelvis No Cont (09.23.23 @ 14:55) >  IMPRESSION:  No CT evidence of bowel obstruction or other acute intra-abdominal   process.    < end of copied text >    - MUSTAPHA 8/14/23 showed EF 55-60%      MUSTAPHA - 9/27 - There is a large and mobile vegetation measuring   1.8x1.0 cm attached to the atrial surface of the medial bioprosthetic   leaflet.

## 2023-09-27 NOTE — PROGRESS NOTE ADULT - SUBJECTIVE AND OBJECTIVE BOX
CCL NP  PRE/POST MUSTAPHA NOTE    Pt presents to cath holding for MUSTAPHA to r/o endocarditis.  VSS  Neuro: A&O X3  Lungs: CTA  CV: RRR    NPO maintained

## 2023-09-27 NOTE — PROGRESS NOTE ADULT - ASSESSMENT
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 Labs consistent with acute renal failure likely in the setting of GI losses/ Poor PO intake, however patient with known HF (last EF 55-60% on MUSTAPHA), given 1L LR in ED, will continue with gentle fluid resuscitation with NS 50 cc/hr and monitor fluid balance/ Renal function. nephrology / cardiology consulted. Blood cultures growing staph epiderdimitis, ECHO showed large mitral valve vegetation, TTE 9/27 -       # sepsis with staphylococcal epididermiditis bacteremia ? source   f/u repeat blood cx  Pt hs Aortic graft and dual valve replaced   s/p MUSTAPHA -  ID and cardio following, cardiothoracic surgery following - await recs   CT A/P shows no acute pathology - Redemonstration on aortic dissection - findings discussed with Radiologist - Dr Lacy- appears stable as compared to prior imaging in 5/2023 .   dc merrem, ct vancomycin   Repeat Blood cultures         #Acute Renal Failure, associated metabolic acidosis   Likely Pre renal, in setting of GI losses / Poor PO intake   - Cr 3.5, baseline from last DC appears to be ~ 1.5-1.8, however patient did need short term HD in the recent past   - Nephrology following     #Intractable Nausea / Vomiting   - resolved at this time - likely 2/2 bacteremia/sepsis.         #Heart Failure with Preserved Ejection Fraction   Follows Dr. Frankel   - Continues on Coreg, will hold torsemide in light of likely dehydration / hyponatremia, resume when clinically appropriate   - MUSTAPHA - EF 55-60%      #Elevated Trp   Likely in setting of renal failure       #Hyponatremia   In setting of poor oral intake, mproving with ivfluids       #paroxysmal Atrial fibrillation   - Rate controlled currently, continue BB, monitor on Tele   - Stopped briefly AC due to SDH in recent past, however CT head 7/2023 shows complete resolution of SDH, resumed AC outpatient per Cardiology, planned for Watchman procedure this coming Oct    #history of SDH   As above, CT head showed complete resolution of recent SDH, continues on AC     #Mitral/Aortic valve replacement   -Recent MUSTAPHA shows bioprosthetic valves with only moderate paravalvular leak in MV.     DVT prophylaxis: Eliquis   Medically acute

## 2023-09-27 NOTE — PROGRESS NOTE ADULT - SUBJECTIVE AND OBJECTIVE BOX
[facility]                                                         CARDIOLOGY AT [facility]                                                                  [address], [place] [address]                                                         Telephone: [phone]. Fax:[phone]                                                                             PROGRESS NOTE    Reason for admission: Sepsis   Initial reason for Consult: r/o endocarditis  Reason for follow up: Endocarditis   Overnight Events: Jared shows MV vegetation, cts consulted.     Review of symptoms:   Cardiac:  No chest pain. No dyspnea. No palpitations.  Respiratory: no cough. No dyspnea  Gastrointestinal: No diarrhea. No abdominal pain. No bleeding.   Neuro: No focal neuro complaints.    Vitals:  T(C): 37.2 (09-27-23 @ 09:55), Max: 37.2 (09-27-23 @ 04:32)  HR: 60 (09-27-23 @ 09:55) (60 - 70)  BP: 140/61 (09-27-23 @ 09:55) (98/61 - 140/61)  RR: 16 (09-27-23 @ 09:55) (16 - 18)  SpO2: 97% (09-27-23 @ 09:55) (95% - 97%)  Wt(kg): --  I&O's Summary    Weight (kg): 77 (09-24 @ 08:35)    PHYSICAL EXAM:  Appearance: Comfortable. No acute distress  HEENT:  Atraumatic. Normocephalic.  Normal oral mucosa  Neurologic: A & O x 3, no gross focal deficits.  Cardiovascular: RRR S1 S2, No murmur, no rubs/gallops. No JVD  Respiratory: Lungs clear to auscultation, unlabored   Gastrointestinal:  Soft, Non-tender, + BS  Lower Extremities: 2+ Peripheral Pulses, No clubbing, cyanosis, or edema  Psychiatry: Patient is calm. No agitation.   Skin: warm and dry.    CURRENT CARDIAC MEDICATIONS:  amLODIPine   Tablet 10 milliGRAM(s) Oral daily  carvedilol 6.25 milliGRAM(s) Oral every 12 hours      CURRENT OTHER MEDICATIONS:  vancomycin  IVPB 1000 milliGRAM(s) IV Intermittent every 24 hours  acetaminophen     Tablet .. 650 milliGRAM(s) Oral every 6 hours PRN Temp greater or equal to 38C (100.4F), Mild Pain (1 - 3)  levETIRAcetam 1000 milliGRAM(s) Oral two times a day  melatonin 3 milliGRAM(s) Oral at bedtime PRN Insomnia  ondansetron Injectable 4 milliGRAM(s) IV Push every 8 hours PRN Nausea and/or Vomiting  aluminum hydroxide/magnesium hydroxide/simethicone Suspension 30 milliLiter(s) Oral every 4 hours PRN Dyspepsia  atorvastatin 40 milliGRAM(s) Oral at bedtime  apixaban 5 milliGRAM(s) Oral two times a day  sodium bicarbonate  Infusion 0.081 mEq/kG/Hr (83 mL/Hr) IV Continuous <Continuous>, Stop order after: 48 Hours  tamsulosin 0.4 milliGRAM(s) Oral at bedtime      LABS:	 	  ( 25 Sep 2023 05:50 )  Troponin T  X    ,  CPK  67   , CKMB  X    , BNP X        , ( 23 Sep 2023 16:06 )  Troponin T  0.08<H>,  CPK  X    , CKMB  X    , BNP X        , ( 23 Sep 2023 12:15 )  Troponin T  0.09<H>,  CPK  X    , CKMB  X    , BNP X                            12.0   16.72 )-----------( 131      ( 27 Sep 2023 05:08 )             37.8     09-27    131<L>  |  97  |  71.3<H>  ----------------------------<  110<H>  4.5   |  20.0<L>  |  2.24<H>    Ca    7.9<L>      27 Sep 2023 05:08  Mg     2.2     09-27      PT/INR/PTT ( 23 Sep 2023 12:15 )                       :                       :      16.6         :       33.3                  .        .                   .              .           .       1.51        .                                       Lipid Profile:   HgA1c:   TSH:

## 2023-09-27 NOTE — PROGRESS NOTE ADULT - PROBLEM SELECTOR PLAN 1
- pt w/ sepsis and blood cultures w/ gram positive cocci   - TTE shows Bioprosthetic mitral valve present with a large mobile lesion (at least 2.0 cm x 1.5 cm) prolapsing into and out of the LV-LA cavity, highly suggestive of vegetation. Mean transmitral valve gradient 4.8 mmHg (at HR of 60 bpm).  - pt also has bioprosthesis in the aortic position, mean gradient of 9 mmHg, no vegetation noted.   - Plan for MUSTAPHA today   - CTS consulted for consideration of re-op MVR   - ID following  - plan of care TBD based upon MUSTAPHA results

## 2023-09-27 NOTE — PROGRESS NOTE ADULT - ASSESSMENT
Improving  SANTOS on CKD - Baseline 1.6 ---> H/O temporary HD ending in Nov 2021 --> was on HD x 1 year   Presented with GI losses  ? Viral enteritis   Recent ECHO and MUSTAPHA noted - Large BPV vegetation    CKD/SANTOS - creat sl better - baseline ?   No hydro on CT 9/23   CPK 67   UDS negative   Thrombosed AVG in L arm , confirmed w/ doppler     MA - Added Bicarb to IVF   Mild hyponatremia    Microscopic hematuria , seen on UA x 2   No stones or bladder lesions seen on CT   Repeat UA / Check culture   Eventual Urology evaluation       Bacteremia - Cultures 9/23 noted   Urine / stool cultures OK   Abx as per ID   Watch Vanco levels

## 2023-09-27 NOTE — CHART NOTE - NSCHARTNOTEFT_GEN_A_CORE
63y Male with PMH of  HTN, HLD, CVA, known to cardiac surgery for hx of Type A dissection repair @ [facility] 2010 complicated by bowel ischemia requiring bowel resection with colostomy (now reversed)/ repeat sternotomy, CABG x 2, AVR, MVR 11/2020 with Dr. Butler, CHF (mixed systolic / diastolic), CKD (with short term on HD 2022), A.fib s/p ablation pending watchman with Dr Trevino, SDH (resolved as of July imaging). Patient presented to [facility] for 2 weeks of inability to tolerate oral intake. Denies any fever or chills. Has chronic diarrhea due to bowel resection. In the ER patient was afebrile but found to have leukocytosis to 23k. Patient was found to have SANTOS. Had a febrile episode to 102F on 9/25. Started on Vancomycin and Meropenem. Blood cultures 9/23 + for staph epidermis.  BC 9/25 + for Gram positive cocci in clusters. TTE 9/26 revealed large mobile lesion on bioprosthetic MVR, likely suggestive of vegetation. CT surgery consulted for evaluation of bioprosthetic MVR lesion.     Plan:  S/P MUSTAPHA today that showed large mobile MV echodensity 1.8cm x 1cm.  Will stop Eliquis with plan for possible surgery.  Will start Vitamin K daily per Dr. Butler.  Non contrast CT of the head ordered.  Non contrast CT maxillofacial ordered> if + findings, we will communicate information to [facility] dental team to review the CT scan.   Patient does also need a CTA of the brain to rule out mycotic aneurysm, as well as a CT of the chest abdomen and pelvis with oral and IV contrast, however, his creatinine is elevated.    Will need renal to optimize patient to be able to proceed with the two contrast studies.  Discussed all of the above with primary team (Dr. Garrett), as well as Dr. Butler.  Will continue to follow.

## 2023-09-27 NOTE — PROGRESS NOTE ADULT - ASSESSMENT
POST MUSTAPHA    S/P MUSTAPHA which revealed large bio prosthetic revegetation, mild AV regurg.  Full report to follow.    VSS  Neuro unchanged    -transfer back to bed

## 2023-09-27 NOTE — PROGRESS NOTE ADULT - ASSESSMENT
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 Patient presented to [facility] for 2 weeks of inability to tolerate oral intake. Denies any fever or chills. Has chronic diarrhea due to bowel resection. In the ER patient was afebrile. Found to have leukocytosis to 23k. Patient was found to have SANTOS. Had a febrile episode to 102F on 9/25. Started on Vancomycin and Meropenem. Blood cultures 2 of 4 bottles with GPC.

## 2023-09-28 LAB
-  AMPICILLIN/SULBACTAM: SIGNIFICANT CHANGE UP
-  CEFAZOLIN: SIGNIFICANT CHANGE UP
-  CLINDAMYCIN: SIGNIFICANT CHANGE UP
-  ERYTHROMYCIN: SIGNIFICANT CHANGE UP
-  GENTAMICIN: SIGNIFICANT CHANGE UP
-  OXACILLIN: SIGNIFICANT CHANGE UP
-  PENICILLIN: SIGNIFICANT CHANGE UP
-  RIFAMPIN: SIGNIFICANT CHANGE UP
-  TETRACYCLINE: SIGNIFICANT CHANGE UP
-  TRIMETHOPRIM/SULFAMETHOXAZOLE: SIGNIFICANT CHANGE UP
-  VANCOMYCIN: SIGNIFICANT CHANGE UP
CULTURE RESULTS: SIGNIFICANT CHANGE UP
CULTURE RESULTS: SIGNIFICANT CHANGE UP
HCT VFR BLD CALC: 33.1 % — LOW (ref 39–50)
HGB BLD-MCNC: 10.8 G/DL — LOW (ref 13–17)
MCHC RBC-ENTMCNC: 27.6 PG — SIGNIFICANT CHANGE UP (ref 27–34)
MCHC RBC-ENTMCNC: 32.6 GM/DL — SIGNIFICANT CHANGE UP (ref 32–36)
MCV RBC AUTO: 84.7 FL — SIGNIFICANT CHANGE UP (ref 80–100)
METHOD TYPE: SIGNIFICANT CHANGE UP
ORGANISM # SPEC MICROSCOPIC CNT: SIGNIFICANT CHANGE UP
PLATELET # BLD AUTO: 136 K/UL — LOW (ref 150–400)
RBC # BLD: 3.91 M/UL — LOW (ref 4.2–5.8)
RBC # FLD: 13.1 % — SIGNIFICANT CHANGE UP (ref 10.3–14.5)
SPECIMEN SOURCE: SIGNIFICANT CHANGE UP
SPECIMEN SOURCE: SIGNIFICANT CHANGE UP
VANCOMYCIN TROUGH SERPL-MCNC: 28.2 UG/ML — CRITICAL HIGH (ref 10–20)
WBC # BLD: 16.33 K/UL — HIGH (ref 3.8–10.5)
WBC # FLD AUTO: 16.33 K/UL — HIGH (ref 3.8–10.5)

## 2023-09-28 PROCEDURE — 99232 SBSQ HOSP IP/OBS MODERATE 35: CPT | Mod: FS

## 2023-09-28 PROCEDURE — 99223 1ST HOSP IP/OBS HIGH 75: CPT | Mod: FS

## 2023-09-28 PROCEDURE — 99232 SBSQ HOSP IP/OBS MODERATE 35: CPT

## 2023-09-28 PROCEDURE — 70486 CT MAXILLOFACIAL W/O DYE: CPT | Mod: 26

## 2023-09-28 PROCEDURE — 70450 CT HEAD/BRAIN W/O DYE: CPT | Mod: 26

## 2023-09-28 PROCEDURE — 99233 SBSQ HOSP IP/OBS HIGH 50: CPT | Mod: FS

## 2023-09-28 PROCEDURE — 99233 SBSQ HOSP IP/OBS HIGH 50: CPT

## 2023-09-28 RX ORDER — CEFAZOLIN SODIUM 1 G
2000 VIAL (EA) INJECTION EVERY 8 HOURS
Refills: 0 | Status: DISCONTINUED | OUTPATIENT
Start: 2023-09-28 | End: 2023-10-01

## 2023-09-28 RX ADMIN — Medication 2000 MILLIGRAM(S): at 21:54

## 2023-09-28 RX ADMIN — Medication 250 MILLIGRAM(S): at 05:16

## 2023-09-28 RX ADMIN — Medication 2000 MILLIGRAM(S): at 13:53

## 2023-09-28 RX ADMIN — CARVEDILOL PHOSPHATE 6.25 MILLIGRAM(S): 80 CAPSULE, EXTENDED RELEASE ORAL at 05:16

## 2023-09-28 RX ADMIN — LEVETIRACETAM 1000 MILLIGRAM(S): 250 TABLET, FILM COATED ORAL at 16:52

## 2023-09-28 RX ADMIN — Medication 100 MILLIGRAM(S): at 16:52

## 2023-09-28 RX ADMIN — ATORVASTATIN CALCIUM 40 MILLIGRAM(S): 80 TABLET, FILM COATED ORAL at 21:54

## 2023-09-28 RX ADMIN — Medication 100 MILLIGRAM(S): at 10:52

## 2023-09-28 RX ADMIN — LEVETIRACETAM 1000 MILLIGRAM(S): 250 TABLET, FILM COATED ORAL at 05:16

## 2023-09-28 RX ADMIN — AMLODIPINE BESYLATE 10 MILLIGRAM(S): 2.5 TABLET ORAL at 05:16

## 2023-09-28 RX ADMIN — CARVEDILOL PHOSPHATE 6.25 MILLIGRAM(S): 80 CAPSULE, EXTENDED RELEASE ORAL at 16:52

## 2023-09-28 RX ADMIN — Medication 5 MILLIGRAM(S): at 13:53

## 2023-09-28 RX ADMIN — TAMSULOSIN HYDROCHLORIDE 0.4 MILLIGRAM(S): 0.4 CAPSULE ORAL at 21:54

## 2023-09-28 NOTE — CONSULT NOTE ADULT - NS ATTEND AMEND GEN_ALL_CORE FT
Patient is seen and examined patient has history of with staph epi bacteremia with endocarditis.  A left upper extremity AV graft that was last used October of last year.  It is currently thrombosed patient unsure when it thrombosed.  He has been having pain in his left arm particularly his forearm for months according to him at least 2 weeks.  On exam there is some erythema at the superior portion tracking on the forearm.  There is perhaps a small amount of fluctuance at the arterial venous anastomoses that is not very obvious.  Duplex ultrasound shows no evidence of fluid around the graft but there is only 3-4 pictures of the graft itself.  Given the tenderness erythema and perhaps a little bit of fluid I do believe that the graft needs to be excised.  Perhaps this was the source of his bacteremia to begin with.  Patient is not septic was on anticoagulation at least until the 27th with Eliquis.  Would plan for graft excision Sunday or Monday depending on how patient does over the weekend.  After 48 hours if needed start heparin drip for bridging.
Patient seen and examined by me.    T(C): 36.6 (09-26-23 @ 11:33), Max: 36.9 (09-25-23 @ 17:25)  HR: 65 (09-26-23 @ 11:33) (65 - 91)  BP: 98/61 (09-26-23 @ 11:33) (98/61 - 136/81)  RR: 18 (09-26-23 @ 11:33) (18 - 18)  SpO2: 97% (09-26-23 @ 11:33) (95% - 97%)  Patient alert and awake.  Chest- Bilateral Clear BS  Cardiac- S1 and S2  Abdomen- Soft    Assessment/Plan:  1. Bacteremia    For MUSTAPHA  I have discussed my recommendation with the PA which are outlined above.  Will follow.

## 2023-09-28 NOTE — PROGRESS NOTE ADULT - ASSESSMENT
Improving  SANTOS on CKD - Baseline 1.6 ---> H/O temporary HD ending in Nov 2021 --> was on HD x 1 year   Presented with GI losses  ? Viral enteritis     Recent ECHO and MUSTAPHA noted - Large BPV vegetation    CKD/SANTOS - creat sl better - baseline ?   No hydro on CT 9/23   CPK 67   UDS negative   Thrombosed AVG in L arm , confirmed w/ doppler     MA - Added Bicarb to IVF   Mild hyponatremia persisting    Microscopic hematuria , seen on UA x 2   No stones or bladder lesions seen on CT   Repeat UA / Check culture   Eventual Urology evaluation       Bacteremia - Cultures 9/23 noted   Urine / stool cultures OK   Abx as per ID for Ac Endocarditis for BP valve  On Kefzol now

## 2023-09-28 NOTE — PROGRESS NOTE ADULT - ASSESSMENT
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 Patient presented to [facility] for 2 weeks of inability to tolerate oral intake. Denies any fever or chills. Has chronic diarrhea due to bowel resection. In the ER patient was afebrile. Found to have leukocytosis to 23k. Patient was found to have SANTOS. Had a febrile episode to 102F on 9/25. Started on Vancomycin and Meropenem. Blood cultures 1 of 4 bottles with Staphylococcus epidermidis      Prosthetic MV endocarditis   Staphylococcus epidermidis bacteremia  Fever  Leukocytosis   SANTOS   PCN allergy       - Blood cultures 9/23, 9/25 reporting Staphylococcus epidermidis  - Repeat blood cultures  pending  - RVP/COVID 19 PCR 9/25 negative   - CT A/P 9/23 reporting no acute findings   - CXR 9/23 no PNA  - TTE   - MUSTAPHA reporting large mobile density MV  - UA 9/25 negative for UTI   - Procalcitonin level not reliable in SANTOS   - PCN allergy but has tolerated Zosyn and Zinacef in the past.   - D/C Vancomycin  - Monitor trough, will order next trough, Vancomycin trough is 28, not accurate since it was during infusion   - Start Cefazolin   - Due to renal failure will hold off on Gentamicin  - Due to Eliquis unable to use Rifampin   - Follow up cultures  - Trend Fever  - Trend WBC      Will Follow    Discussed treatment plan with: medical team, clinical pharmacy and CT Surgery

## 2023-09-28 NOTE — PROGRESS NOTE ADULT - SUBJECTIVE AND OBJECTIVE BOX
CC: Patient is a 63y old  Male who presents with a chief complaint of Intractable N/V, SANTOS (28 Sep 2023 12:17)      Patient seen and examined at bedside, No acute overnight events.    ROS: Denies fever, nausea, vomiting, chest pain, SOB, abdominal pain, diarrhea and constipation    Vital Sign  Vital Signs Last 24 Hrs  T(C): 36.8 (28 Sep 2023 16:07), Max: 37 (28 Sep 2023 11:10)  T(F): 98.3 (28 Sep 2023 16:07), Max: 98.6 (28 Sep 2023 11:10)  HR: 64 (28 Sep 2023 16:07) (63 - 85)  BP: 131/68 (28 Sep 2023 16:07) (117/64 - 131/68)  BP(mean): --  RR: 18 (28 Sep 2023 16:07) (18 - 19)  SpO2: 97% (28 Sep 2023 16:07) (95% - 97%)    Parameters below as of 28 Sep 2023 11:10  Patient On (Oxygen Delivery Method): room air        Physical Exam:   Gen: NAD  HEENT: NCAT, EOMI, PERRLA, Pupils_  CVS: RRR, +S1/S2, no murmurs, rubs or gallops appreciated  Lungs: CTAB, no wheeze, rales, rhonchi  Abdomen: +BS, soft, ND, NT. no palpable flank tenderness or mass, no CVA tenderness  Ext: No cyanosis, edema or calf tenderness  Neuro: AAOx3, no focal deficits.    Labs:                        10.8   16.33 )-----------( 136      ( 28 Sep 2023 05:40 )             33.1   09-27    131<L>  |  97  |  71.3<H>  ----------------------------<  110<H>  4.5   |  20.0<L>  |  2.24<H>    Ca    7.9<L>      27 Sep 2023 05:08  Mg     2.2     09-27 09-27 @ 07:01  -  09-28 @ 07:00  --------------------------------------------------------  IN: 0 mL / OUT: 500 mL / NET: -500 mL        Radiology:  *Pull    Medications:  MEDICATIONS  (STANDING):  amLODIPine   Tablet 10 milliGRAM(s) Oral daily  atorvastatin 40 milliGRAM(s) Oral at bedtime  carvedilol 6.25 milliGRAM(s) Oral every 12 hours  ceFAZolin  Injectable. 2000 milliGRAM(s) IV Push every 8 hours  levETIRAcetam 1000 milliGRAM(s) Oral two times a day  phytonadione   Solution 5 milliGRAM(s) Oral daily  sodium bicarbonate  Infusion 0.081 mEq/kG/Hr (83 mL/Hr) IV Continuous <Continuous>  tamsulosin 0.4 milliGRAM(s) Oral at bedtime    MEDICATIONS  (PRN):  acetaminophen     Tablet .. 650 milliGRAM(s) Oral every 6 hours PRN Temp greater or equal to 38C (100.4F), Mild Pain (1 - 3)  aluminum hydroxide/magnesium hydroxide/simethicone Suspension 30 milliLiter(s) Oral every 4 hours PRN Dyspepsia  guaiFENesin Oral Liquid (Sugar-Free) 100 milliGRAM(s) Oral every 6 hours PRN Cough  melatonin 3 milliGRAM(s) Oral at bedtime PRN Insomnia  ondansetron Injectable 4 milliGRAM(s) IV Push every 8 hours PRN Nausea and/or Vomiting

## 2023-09-28 NOTE — CONSULT NOTE ADULT - SUBJECTIVE AND OBJECTIVE BOX
Vascular Attending:        HPI:  Mr. Cummins is a 64 yo M with a PMH significant for HTN, HLD, Type A dissection / repeat sternotomy, and CABG x2, CHF (mixed systolic / diastolic), mitral / aortic valve replacement, CKD (with short term on HD 2022), A.fib s/p ablation pending watchman, SDH (resolved as of July imaging) who presents to [facility] for ~ 2 weeks of inability to tolerate oral intake. Patient states that over the last 2 weeks he has progressively lost the ability to stomach foods, beginning with solids and now also involving liquids when he takes in too much. He states he has been to 2 car shows within the last 2 weeks (both on Sundays) and noticed the symptoms begin the day after the first car show on 9/11. 10-15 minutes after intaking food or large amounts of liquids he will feel the need to vomit and after vomiting he will feel better. The emesis is NBNB only containing what he ate. Due to this he has been unable to tolerate his usual medicines including his blood thinner. He continues to have bowel movements and pass gas, and feels well if he doesn't try to ingest anything. His only remaining symptom was mild SOB on exertion last night and this morning. He denies fevers/chills, dysuria/ anuria, constipation / diarrhea, recent sick contacts or changes to medications.   In ED patients vitals were wnl. Lab work showed hyponatremia of 127, with hypochloremia of 91, bicarb of 14, BUN 71.4, and Cr 3.5 as well as elevated WBC to 23.94 and elevated Trp to .09 with BNP 4805. Imaging with CT Abd/pelvis showed no acute pathology. Given 1L LR in ED, to be continued with NS at 50 cc'hr given likely dehydrated status, Nephrology and Cardiology consulted in ED. Patient to be admitted to medical floors for further monitoring.  (23 Sep 2023 17:52)      PAST MEDICAL & SURGICAL HISTORY:  CHF (congestive heart failure)      CVA (cerebral vascular accident)      Seizures  last seizure 10 years ago      HTN (hypertension)      Hyperlipemia      COVID-19  october 2020      Atrial fibrillation      Former smoker      History of cocaine use  remote hx, currently sober      H/O aortic dissection  s/p repair (2010), surgery complicated by bowel ischemia s/p bowel resection and ostomy with reversal      H/O aortic valve insufficiency      Mitral regurgitation      GIB (gastrointestinal bleeding)      CAD (coronary artery disease)  s/p PCI 1998  and CABG x 2 11/2020      Chronic atrial fibrillation      H/O colectomy      Status post double vessel coronary artery bypass  11/2020 Dr Butler      S/P AVR (aortic valve replacement)  (t)AVR 11/2020 Dr Butler      S/P MVR (mitral valve replacement)  (t)MVR 11/2020 Dr Butler      H/O aortic dissection  Type A; emergent repair 2010      AV fistula  LUE          REVIEW OF SYSTEMS      General:	    Skin/Breast:  	  Ophthalmologic:  	  ENMT:	    Respiratory and Thorax:  	  Cardiovascular:	    Gastrointestinal:	    Genitourinary:	    Musculoskeletal:	    Neurological:	    Psychiatric:	    Hematology/Lymphatics:	    Endocrine:	    Allergic/Immunologic:	    MEDICATIONS  (STANDING):  amLODIPine   Tablet 10 milliGRAM(s) Oral daily  atorvastatin 40 milliGRAM(s) Oral at bedtime  carvedilol 6.25 milliGRAM(s) Oral every 12 hours  ceFAZolin  Injectable. 2000 milliGRAM(s) IV Push every 8 hours  levETIRAcetam 1000 milliGRAM(s) Oral two times a day  phytonadione   Solution 5 milliGRAM(s) Oral daily  sodium bicarbonate  Infusion 0.081 mEq/kG/Hr (83 mL/Hr) IV Continuous <Continuous>  tamsulosin 0.4 milliGRAM(s) Oral at bedtime    MEDICATIONS  (PRN):  acetaminophen     Tablet .. 650 milliGRAM(s) Oral every 6 hours PRN Temp greater or equal to 38C (100.4F), Mild Pain (1 - 3)  aluminum hydroxide/magnesium hydroxide/simethicone Suspension 30 milliLiter(s) Oral every 4 hours PRN Dyspepsia  guaiFENesin Oral Liquid (Sugar-Free) 100 milliGRAM(s) Oral every 6 hours PRN Cough  melatonin 3 milliGRAM(s) Oral at bedtime PRN Insomnia  ondansetron Injectable 4 milliGRAM(s) IV Push every 8 hours PRN Nausea and/or Vomiting      Allergies    penicillin (Other)    Intolerances        SOCIAL HISTORY:      Vital Signs Last 24 Hrs  T(C): 37 (28 Sep 2023 11:10), Max: 37 (28 Sep 2023 11:10)  T(F): 98.6 (28 Sep 2023 11:10), Max: 98.6 (28 Sep 2023 11:10)  HR: 66 (28 Sep 2023 11:10) (58 - 85)  BP: 120/56 (28 Sep 2023 11:10) (113/60 - 128/63)  BP(mean): --  RR: 19 (28 Sep 2023 11:10) (14 - 19)  SpO2: 97% (28 Sep 2023 11:10) (95% - 100%)    Parameters below as of 28 Sep 2023 11:10  Patient On (Oxygen Delivery Method): room air        PHYSICAL EXAM:      Constitutional:    Eyes:    ENMT:    Neck:    Breasts:    Back:    Respiratory:    Cardiovascular:    Gastrointestinal:    Genitourinary:    Rectal:    Extremities:    Vascular:    Neurological:    Skin:    Lymph Nodes:    Musculoskeletal:    Psychiatric:      Pulses:   Right:                                                                          Left:  FEM [ ]2+ [ ]1+ [ ]doppler                                             FEM [ ]2+ [ ]1+ [ ]doppler    POP [ ]2+ [ ]1+ [ ]doppler                                             POP [ ]2+ [ ]1+ [ ]doppler    DP [ ]2+ [ ]1+ [ ]doppler                                                DP [ ]2+ [ ]1+ [ ]doppler  PT[ ]2+ [ ]1+ [ ]doppler                                                  PT [ ]2+ [ ]1+ [ ]doppler      LABS:                        10.8   16.33 )-----------( 136      ( 28 Sep 2023 05:40 )             33.1     09-27    131<L>  |  97  |  71.3<H>  ----------------------------<  110<H>  4.5   |  20.0<L>  |  2.24<H>    Ca    7.9<L>      27 Sep 2023 05:08  Mg     2.2     09-27        Urinalysis Basic - ( 27 Sep 2023 05:08 )    Color: x / Appearance: x / SG: x / pH: x  Gluc: 110 mg/dL / Ketone: x  / Bili: x / Urobili: x   Blood: x / Protein: x / Nitrite: x   Leuk Esterase: x / RBC: x / WBC x   Sq Epi: x / Non Sq Epi: x / Bacteria: x        RADIOLOGY & ADDITIONAL STUDIES    Impression and Plan: Vascular Attending:        HPI:  Mr. Cummnis is a 64 yo M with a PMH significant for HTN, HLD, Type A dissection / repeat sternotomy, and CABG x2, CHF (mixed systolic / diastolic), mitral / aortic valve replacement, CKD (with short term on HD 2022), A.fib s/p ablation pending watchman, SDH (resolved as of July imaging) who presents to [facility] for ~ 2 weeks of inability to tolerate oral intake. Patient states that over the last 2 weeks he has progressively lost the ability to stomach foods, beginning with solids and now also involving liquids when he takes in too much. He states he has been to 2 car shows within the last 2 weeks (both on Sundays) and noticed the symptoms begin the day after the first car show on 9/11. 10-15 minutes after intaking food or large amounts of liquids he will feel the need to vomit and after vomiting he will feel better. The emesis is NBNB only containing what he ate. Due to this he has been unable to tolerate his usual medicines including his blood thinner. He continues to have bowel movements and pass gas, and feels well if he doesn't try to ingest anything. His only remaining symptom was mild SOB on exertion last night and this morning. He denies fevers/chills, dysuria/ anuria, constipation / diarrhea, recent sick contacts or changes to medications.   In ED patients vitals were wnl. Lab work showed hyponatremia of 127, with hypochloremia of 91, bicarb of 14, BUN 71.4, and Cr 3.5 as well as elevated WBC to 23.94 and elevated Trp to .09 with BNP 4805. Imaging with CT Abd/pelvis showed no acute pathology. Given 1L LR in ED, to be continued with NS at 50 cc'hr given likely dehydrated status, Nephrology and Cardiology consulted in ED. Patient to be admitted to medical floors for further monitoring.  (23 Sep 2023 17:52)      Vascular consult:  Patient was seen and evaluated at bedside. 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 Bacterial cultures growing staph epi. Patient has left forearm loop AV fistula graft, previously on dialysis, has not needed dialysis since October 2022. Vascular consulted for possible source of infection coming from AV fistula. Fistula on right arm warm to touch, TTP and erythematous.     PAST MEDICAL & SURGICAL HISTORY:  CHF (congestive heart failure)      CVA (cerebral vascular accident)      Seizures  last seizure 10 years ago      HTN (hypertension)      Hyperlipemia      COVID-19  october 2020      Atrial fibrillation      Former smoker      History of cocaine use  remote hx, currently sober      H/O aortic dissection  s/p repair (2010), surgery complicated by bowel ischemia s/p bowel resection and ostomy with reversal      H/O aortic valve insufficiency      Mitral regurgitation      GIB (gastrointestinal bleeding)      CAD (coronary artery disease)  s/p PCI 1998  and CABG x 2 11/2020      Chronic atrial fibrillation      H/O colectomy      Status post double vessel coronary artery bypass  11/2020 Dr Butler      S/P AVR (aortic valve replacement)  (t)AVR 11/2020 Dr Butler      S/P MVR (mitral valve replacement)  (t)MVR 11/2020 Dr Butler      H/O aortic dissection  Type A; emergent repair 2010      AV fistula  LUE              Allergic/Immunologic:	    MEDICATIONS  (STANDING):  amLODIPine   Tablet 10 milliGRAM(s) Oral daily  atorvastatin 40 milliGRAM(s) Oral at bedtime  carvedilol 6.25 milliGRAM(s) Oral every 12 hours  ceFAZolin  Injectable. 2000 milliGRAM(s) IV Push every 8 hours  levETIRAcetam 1000 milliGRAM(s) Oral two times a day  phytonadione   Solution 5 milliGRAM(s) Oral daily  sodium bicarbonate  Infusion 0.081 mEq/kG/Hr (83 mL/Hr) IV Continuous <Continuous>  tamsulosin 0.4 milliGRAM(s) Oral at bedtime    MEDICATIONS  (PRN):  acetaminophen     Tablet .. 650 milliGRAM(s) Oral every 6 hours PRN Temp greater or equal to 38C (100.4F), Mild Pain (1 - 3)  aluminum hydroxide/magnesium hydroxide/simethicone Suspension 30 milliLiter(s) Oral every 4 hours PRN Dyspepsia  guaiFENesin Oral Liquid (Sugar-Free) 100 milliGRAM(s) Oral every 6 hours PRN Cough  melatonin 3 milliGRAM(s) Oral at bedtime PRN Insomnia  ondansetron Injectable 4 milliGRAM(s) IV Push every 8 hours PRN Nausea and/or Vomiting      Allergies    penicillin (Other)    Intolerances        SOCIAL HISTORY:      Vital Signs Last 24 Hrs  T(C): 37 (28 Sep 2023 11:10), Max: 37 (28 Sep 2023 11:10)  T(F): 98.6 (28 Sep 2023 11:10), Max: 98.6 (28 Sep 2023 11:10)  HR: 66 (28 Sep 2023 11:10) (58 - 85)  BP: 120/56 (28 Sep 2023 11:10) (113/60 - 128/63)  BP(mean): --  RR: 19 (28 Sep 2023 11:10) (14 - 19)  SpO2: 97% (28 Sep 2023 11:10) (95% - 100%)    Parameters below as of 28 Sep 2023 11:10  Patient On (Oxygen Delivery Method): room air        PHYSICAL EXAM:  Gen: AAO x 3, pt lying in bed, alert, in NAD  Resp: unlabored  CVS: RRR  Abd: soft, NT, ND  Ext: Moving all extremities spontaneously, sensation intact, pulses 2+   av fistula     Pulses:   Right: radial only palpated on right                                  Left: ulnar pulse only noted   DP [X ]2+ [ ]1+ [ ]doppler                                                DP [X ]2+ [ ]1+ [ ]doppler  PT[X ]2+ [ ]1+ [ ]doppler                                                  PT [X ]2+ [ ]1+ [ ]doppler      LABS:                        10.8   16.33 )-----------( 136      ( 28 Sep 2023 05:40 )             33.1     09-27    131<L>  |  97  |  71.3<H>  ----------------------------<  110<H>  4.5   |  20.0<L>  |  2.24<H>    Ca    7.9<L>      27 Sep 2023 05:08  Mg     2.2     09-27        Urinalysis Basic - ( 27 Sep 2023 05:08 )    Color: x / Appearance: x / SG: x / pH: x  Gluc: 110 mg/dL / Ketone: x  / Bili: x / Urobili: x   Blood: x / Protein: x / Nitrite: x   Leuk Esterase: x / RBC: x / WBC x   Sq Epi: x / Non Sq Epi: x / Bacteria: x        RADIOLOGY & ADDITIONAL STUDIES  < from: US Duplex Hemodialysis Access (09.25.23 @ 22:05) >  Radiocephalic AV graft is identified in the left forearm.   Echogenic material fills the graft and no blood flow is demonstrable   within it by color or spectral Doppler.    IMPRESSION: Thrombosis of left upper extremity AV graft.    < end of copied text >      Impression and Plan:    Assessment   Patient admitted with endocarditis growing staph epi on blood cultures, vascular consulted due to possible source of infection coming from AV fistula. On exam loop AV fistula graft noted on the left forearm, warm to touch, ttp, and erythematous possible source of infection. On imaging the fistula was found to be thrombosed.          Plan:   -moderate concerns for the AVG being source of infection due to physical findings  -will discuss with surgical attending   -likely with be indium scan vs surgical intervention   -please obtain pre-op cardiology risk stratification  -Continue to hold Eliquis for anticoagulation for now     Thank you for the consult, please call with any questions

## 2023-09-28 NOTE — PROGRESS NOTE ADULT - SUBJECTIVE AND OBJECTIVE BOX
Patient seen and examined    I&O's Summary    27 Sep 2023 07:01  -  28 Sep 2023 07:00  --------------------------------------------------------  IN: 0 mL / OUT: 500 mL / NET: -500 mL        REVIEW OF SYSTEMS:    CONSTITUTIONAL: No F/C  RESPIRATORY: No cough,  No SOB  CARDIOVASCULAR: No CP/palpitations,    GASTROINTESTINAL: No abdominal pain  or NVD   GENITOURINARY: No UTI sx  NEUROLOGICAL: No headaches, NO wk/numbness  MUSCULOSKELETAL:   EXTREMITIES : no swelling,    Vital Signs Last 24 Hrs  T(C): 36.8 (28 Sep 2023 16:07), Max: 37 (28 Sep 2023 11:10)  T(F): 98.3 (28 Sep 2023 16:07), Max: 98.6 (28 Sep 2023 11:10)  HR: 64 (28 Sep 2023 16:07) (63 - 66)  BP: 131/68 (28 Sep 2023 16:07) (120/56 - 131/68)  BP(mean): --  RR: 18 (28 Sep 2023 16:07) (18 - 19)  SpO2: 97% (28 Sep 2023 16:07) (95% - 97%)    Parameters below as of 28 Sep 2023 11:10  Patient On (Oxygen Delivery Method): room air        PHYSICAL EXAM:    GENERAL: NAD,   EYES:  conjunctiva and sclera clear  NECK: Supple, No JVD/Bruit  NERVOUS SYSTEM:  A/O x3,   CHEST:  No rales or rhonchi  HEART:  RRR, No murmur  ABDOMEN: Soft, NT/ND BS+  EXTREMITIES:  No Edema;  SKIN: No rashes    LABS:                          10.8   16.33 )-----------( 136      ( 28 Sep 2023 05:40 )             33.1     09-27    131<L>  |  97  |  71.3<H>  ----------------------------<  110<H>  4.5   |  20.0<L>  |  2.24<H>    Ca    7.9<L>      27 Sep 2023 05:08  Mg     2.2     09-27        MEDICATIONS  (STANDING):  acetaminophen     Tablet .. PRN  aluminum hydroxide/magnesium hydroxide/simethicone Suspension PRN  amLODIPine   Tablet  atorvastatin  carvedilol  ceFAZolin  Injectable.  guaiFENesin Oral Liquid (Sugar-Free) PRN  levETIRAcetam  melatonin PRN  ondansetron Injectable PRN  phytonadione   Solution  sodium bicarbonate  Infusion  tamsulosin

## 2023-09-28 NOTE — PROGRESS NOTE ADULT - SUBJECTIVE AND OBJECTIVE BOX
Subjective: c/o pain at Left arm fistula site, denies CP, palpitations, SOB, cough, fever, chills, itchiness/rash, diaphoresis, vision changes, HA, dizziness/lightheadedness, numbness/tingling, abd pain, N/V     T(C): 37 (09-28-23 @ 11:10), Max: 37 (09-28-23 @ 11:10)  HR: 66 (09-28-23 @ 11:10) (61 - 85)  BP: 120/56 (09-28-23 @ 11:10) (117/64 - 128/63)  RR: 19 (09-28-23 @ 11:10) (18 - 19)  SpO2: 97% (09-28-23 @ 11:10) (95% - 100%)    09-27    131<L>  |  97  |  71.3<H>  ----------------------------<  110<H>  4.5   |  20.0<L>  |  2.24<H>    Ca    7.9<L>      27 Sep 2023 05:08  Mg     2.2     09-27                                 10.8   16.33 )-----------( 136      ( 28 Sep 2023 05:40 )             33.1    I&O's Detail    27 Sep 2023 07:01  -  28 Sep 2023 07:00  --------------------------------------------------------  IN:  Total IN: 0 mL    OUT:    Voided (mL): 500 mL  Total OUT: 500 mL  Total NET: -500 mL    Drug Dosing Weight  Height (cm): 172.7 (23 Sep 2023 10:08)  Weight (kg): 77 (24 Sep 2023 08:35)  BMI (kg/m2): 25.8 (24 Sep 2023 08:35)  BSA (m2): 1.91 (24 Sep 2023 08:35)    MEDICATIONS  (STANDING):  amLODIPine   Tablet 10 milliGRAM(s) Oral daily  atorvastatin 40 milliGRAM(s) Oral at bedtime  carvedilol 6.25 milliGRAM(s) Oral every 12 hours  ceFAZolin  Injectable. 2000 milliGRAM(s) IV Push every 8 hours  levETIRAcetam 1000 milliGRAM(s) Oral two times a day  phytonadione   Solution 5 milliGRAM(s) Oral daily  sodium bicarbonate  Infusion 0.081 mEq/kG/Hr (83 mL/Hr) IV Continuous <Continuous>  tamsulosin 0.4 milliGRAM(s) Oral at bedtime    MEDICATIONS  (PRN):  acetaminophen     Tablet .. 650 milliGRAM(s) Oral every 6 hours PRN Temp greater or equal to 38C (100.4F), Mild Pain (1 - 3)  aluminum hydroxide/magnesium hydroxide/simethicone Suspension 30 milliLiter(s) Oral every 4 hours PRN Dyspepsia  guaiFENesin Oral Liquid (Sugar-Free) 100 milliGRAM(s) Oral every 6 hours PRN Cough  melatonin 3 milliGRAM(s) Oral at bedtime PRN Insomnia  ondansetron Injectable 4 milliGRAM(s) IV Push every 8 hours PRN Nausea and/or Vomiting    Physical Exam  Gen: NAD  Neuro: A&Ox3 non focal speech clear and intact  Pulm: CTA b/l no wheezing  CV: S1S2 RRR  Abd: +BS soft NT ND  Extrem/MS: no edema/cyanosis CORDOVA, L forearm fistula site with erythema, warm, tender to light touch

## 2023-09-28 NOTE — PROGRESS NOTE ADULT - SUBJECTIVE AND OBJECTIVE BOX
[facility]  INFECTIOUS DISEASES at [place] / [place] / [place]  =======================================================                               Roosevelt Victor MD#   Fabián Quigley MD*                             Valentine Handley MD*   Reba Catalan MD*                              Professor Emeritus:  Dr Johnny Hathaway MD^            Diplomates American Board of Internal Medicine & Infectious Diseases                # [place] Office - Appt - Tel  [phone] Fax [phone]                * [place] Office - Appt - Tel [phone] Fax [phone]                      ^[place] Office - Tel  [phone] Fax [phone]                                  Hospital Consult line:  [phone]  =======================================================    JONATHAN GIBSON [id number]    Follow up: MV Endocarditis     no fevers       Allergies:  penicillin (Other)       REVIEW OF SYSTEMS:  CONSTITUTIONAL:  No Fever or chills  HEENT:  No diplopia or blurred vision.  No earache, sore throat or runny nose.  CARDIOVASCULAR:  No chest pain  RESPIRATORY:  No cough, shortness of breath  GASTROINTESTINAL:  No nausea, vomiting or diarrhea.  GENITOURINARY:  No dysuria, frequency or urgency. No Blood in urine  MUSCULOSKELETAL:  no joint aches, no muscle pain  SKIN:  No change in skin, hair or nails.  NEUROLOGIC:  No Headaches      Physical Exam:  GEN: NAD  HEENT: normocephalic and atraumatic. EOMI. PERRL.    NECK: Supple.   LUNGS: CTA B/L.  HEART: RRR  ABDOMEN: Soft, NT, ND.  +BS.    : No CVA tenderness  EXTREMITIES: Without  edema.  MSK: No joint swelling  NEUROLOGIC: No Focal Deficits       Vitals:  T(F): 98.3 (28 Sep 2023 04:33), Max: 98.3 (27 Sep 2023 16:28)  HR: 63 (28 Sep 2023 04:33)  BP: 128/63 (28 Sep 2023 04:33)  RR: 18 (28 Sep 2023 04:33)  SpO2: 95% (28 Sep 2023 04:33) (94% - 100%)  temp max in last 48H T(F): , Max: 99 (09-27-23 @ 04:32)      Current Antibiotics:  vancomycin  IVPB 1000 milliGRAM(s) IV Intermittent every 48 hours    Other medications:  amLODIPine   Tablet 10 milliGRAM(s) Oral daily  atorvastatin 40 milliGRAM(s) Oral at bedtime  carvedilol 6.25 milliGRAM(s) Oral every 12 hours  levETIRAcetam 1000 milliGRAM(s) Oral two times a day  phytonadione   Solution 5 milliGRAM(s) Oral daily  sodium bicarbonate  Infusion 0.081 mEq/kG/Hr IV Continuous <Continuous>  tamsulosin 0.4 milliGRAM(s) Oral at bedtime                 10.8   16.33 )-----------( 136      ( 28 Sep 2023 05:40 )             33.1     09-27    131<L>  |  97  |  71.3<H>  ----------------------------<  110<H>  4.5   |  20.0<L>  |  2.24<H>    Ca    7.9<L>      27 Sep 2023 05:08  Mg     2.2     09-27      RECENT CULTURES:  09-25 @ 06:50 .Stool Feces     No enteric pathogens isolated.  (Stool culture examined for Salmonella,  Shigella, Campylobacter, Aeromonas, Plesiomonas,  Vibrio, E.coli O157 and Yersinia)    09-25 @ 06:00    [facility]    09-25 @ 05:50 .Blood Blood-Peripheral     Growth in aerobic bottle: Staphylococcus epidermidis  Susceptibility to follow.  Growth in anaerobic bottle: Gram Positive Cocci in Clusters  Growth in aerobic bottle: Gram Positive Cocci in Clusters  Growth in anaerobic bottle: Gram Positive Cocci in Clusters    09-25 @ 05:13 .Blood Blood-Peripheral     Growth in aerobic bottle: Staphylococcus epidermidis  "Susceptibilities not performed"  Growth in aerobic bottle: Gram Positive Cocci in Clusters    09-25 @ 01:00 Clean Catch Clean Catch (Midstream)     <10,000 CFU/mL Normal Urogenital Sivan    09-23 @ 19:30 .Blood Blood-Peripheral     Growth in aerobic and anaerobic bottles: Staphylococcus epidermidis  "Susceptibilities not performed"  Growth in aerobic bottle: Gram Positive Cocci in Clusters  Growth in anaerobic bottle: Gram Positive Cocci in Clusters    09-23 @ 19:25 .Blood Blood-Peripheral Blood Culture PCR  Staphylococcus epidermidis    Growth in aerobic bottle: Staphylococcus epidermidis  Growth in anaerobic bottle: Gram Positive Cocci in Clusters  Direct identification is available within approximately 3-5  hours either by Blood Panel Multiplexed PCR or Direct  MALDI-TOF. Details: [email or web address]  Growth in aerobic bottle: Gram Positive Cocci in Clusters  Growth in anaerobic bottle: Gram Positive Cocci in Clusters      WBC Count: 16.33 K/uL (09-28-23 @ 05:40)  WBC Count: 16.72 K/uL (09-27-23 @ 05:08)  WBC Count: 18.81 K/uL (09-26-23 @ 06:26)  WBC Count: 18.52 K/uL (09-25-23 @ 05:50)  WBC Count: 23.28 K/uL (09-24-23 @ 05:46)  WBC Count: 23.94 K/uL (09-23-23 @ 12:15)    Creatinine: 2.24 mg/dL (09-27-23 @ 05:08)  Creatinine: 2.27 mg/dL (09-26-23 @ 10:50)  Creatinine: 1.79 mg/dL (09-26-23 @ 06:26)  Creatinine: 2.79 mg/dL (09-25-23 @ 05:50)  Creatinine: 2.89 mg/dL (09-24-23 @ 05:46)  Creatinine: 3.50 mg/dL (09-23-23 @ 12:15)    Procalcitonin, Serum: 1.75 ng/mL (09-25-23 @ 05:50)     SARS-CoV-2: NotDetec (09-25-23 @ 06:00)    Vancomycin Level, Trough: 28.2 ug/mL (09-28-23 @ 05:40)  Vancomycin Level, Random: 19.8 ug/mL (09-27-23 @ 05:08)  Vancomycin Level, Random: 10.1 ug/mL (09-26-23 @ 06:26)        < from: MUSTAPHA Echo Doppler (09.27.23 @ 10:59) >  Summary:   1. Left ventricular ejection fraction, by visual estimation, is 55 to 60%.   2. Severely enlarged left atrium.   3. Mild mitral valve regurgitation.   4. Mitral prosthesis vegetation.   5. There is a bioprosthetic valve in the mitral position with thickened   leaflets yet leaflet mobility is fairly preserved. There is trace to mild   valvular regurgitation with no perivalvular leaks. No rocking motion or   dehiscence noted. There is a large and mobile vegetation measuring   1.8x1.0 cm attached to the atrial surface of the medial bioprosthetic   leaflet. There is evidence of at least mild valve stenosis with a mean   transvalvular gradient of 5 mmHg at a heart rate of 60 bpm. The mitral   valve area by 3D is 1.57 cm2.   6. Mild tricuspid regurgitation.   7. Bioprosthesis in the aortic position.   8. Dilatation of the aortic root.    < end of copied text >      < from: TTE Echo Complete w/o Contrast w/ Doppler (09.26.23 @ 14:32) >  Summary:   1. Left ventricular ejection fraction, by visual estimation, is 50 to   55%.   2. Normal global left ventricular systolic function.   3. Diastolic function indeterminate.   4. Abnormal septal motion consistent with post-operative status.   5. Normal RV size with mildly reduced RV systolic function, inadequate   estimation of RVSP.   6. Bioprosthetic mitral valve present with a large mobile lesion (at   least 2.0 cm x 1.5 cm) prolapsing into and out of the LV-LA cavity,   hightly suggestive of vegetation. Mean transmitral valve gradient 4.8   mmHg (at HR of 60 bpm).   7. Bioprosthesis in the aortic position, mean gradient of 9 mmHg.   8. There is no evidence of pericardial effusion.   9. Compared to the prior TTE study from 5/23/23, lesion over bioMVR is   again noted and is now much larger and recommend MUSTAPHA study to confirm   vegetation and exclude other valvular involvement.    < end of copied text >

## 2023-09-28 NOTE — PROGRESS NOTE ADULT - ASSESSMENT
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 Labs consistent with acute renal failure likely in the setting of GI losses/ Poor PO intake, however patient with known HF (last EF 55-60% on MUSTAPHA), given 1L LR in ED, will continue with gentle fluid resuscitation with NS 50 cc/hr and monitor fluid balance/ Renal function. nephrology / cardiology consulted. Blood cultures growing staph epiderdimitis, ECHO showed large mitral valve vegetation, TTE 9/27 -       # sepsis with staphylococcal epididermiditis bacteremia ? source   f/u repeat blood cx  Pt hs Aortic graft and dual valve replaced   s/p MUSTAPHA -  ID and cardio following, cardiothoracic surgery following - await recs   CT A/P shows no acute pathology - Redemonstration on aortic dissection - findings discussed with Radiologist - Dr Lacy- appears stable as compared to prior imaging in 5/2023 .   dc merrem, ct vancomycin   Repeat Blood cultures   - elevated vanc trough- likely incorrect as it was drawn during infusion        #Acute Renal Failure, associated metabolic acidosis   Likely Pre renal, in setting of GI losses / Poor PO intake   - Cr 3.5, baseline from last DC appears to be ~ 1.5-1.8, however patient did need short term HD in the recent past   - Nephrology following     #Intractable Nausea / Vomiting   - resolved at this time      #Heart Failure with Preserved Ejection Fraction   Follows Dr. Frankel   - Continues on Coreg, will hold torsemide in light of likely dehydration / hyponatremia, resume when clinically appropriate   - MUSTAPHA - EF 55-60%      #Elevated Trp   Likely in setting of renal failure       #Hyponatremia   In setting of poor oral intake, mproving with ivfluids       #paroxysmal Atrial fibrillation   - Rate controlled currently, continue BB, monitor on Tele   - Stopped briefly AC due to SDH in recent past, however CT head 7/2023 shows complete resolution of SDH, resumed AC outpatient per Cardiology, planned for Watchman procedure this coming Oct  - AC on hold currently    #history of SDH   As above, CT head showed complete resolution of recent SDH, continues on AC     #Mitral/Aortic valve replacement   -Recent MUSTAPHA shows bioprosthetic valves with only moderate paravalvular leak in MV.     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 Labs consistent with acute renal failure likely in the setting of GI losses/ Poor PO intake, however patient with known HF (last EF 55-60% on MUSTAPHA), given 1L LR in ED, will continue with gentle fluid resuscitation with NS 50 cc/hr and monitor fluid balance/ Renal function. nephrology / cardiology consulted. Blood cultures growing staph epiderdimitis, ECHO showed large mitral valve vegetation, TTE 9/27 -       # sepsis with staphylococcal epididermiditis bacteremia ? source   f/u repeat blood cx  Pt hs Aortic graft and dual valve replaced   s/p MUSTAPHA -  ID and cardio following, cardiothoracic surgery following - await recs   CT A/P shows no acute pathology - Redemonstration on aortic dissection - findings discussed with Radiologist - Dr Lacy- appears stable as compared to prior imaging in 5/2023 .     Repeat Blood cultures   - elevated vanc trough- likely incorrect as it was drawn during infusion  - as per ID Start Cefazolin, vanc dc      #Acute Renal Failure, associated metabolic acidosis   Likely Pre renal, in setting of GI losses / Poor PO intake   - Cr 3.5, baseline from last DC appears to be ~ 1.5-1.8, however patient did need short term HD in the recent past   - Nephrology following     #Intractable Nausea / Vomiting   - resolved at this time      #Heart Failure with Preserved Ejection Fraction   Follows Dr. Frankel   - Continues on Coreg, will hold torsemide in light of likely dehydration / hyponatremia, resume when clinically appropriate   - MUSTAPHA - EF 55-60%      #Elevated Trp   Likely in setting of renal failure       #Hyponatremia   In setting of poor oral intake, mproving with ivfluids       #paroxysmal Atrial fibrillation   - Rate controlled currently, continue BB, monitor on Tele   - Stopped briefly AC due to SDH in recent past, however CT head 7/2023 shows complete resolution of SDH, resumed AC outpatient per Cardiology, planned for Watchman procedure this coming Oct  - AC on hold currently    #history of SDH   As above, CT head showed complete resolution of recent SDH, continues on AC     #Mitral/Aortic valve replacement   -Recent MUSTAPHA shows bioprosthetic valves with only moderate paravalvular leak in MV.     DVT prophylaxis:SCD  Medically acute

## 2023-09-28 NOTE — PROGRESS NOTE ADULT - PROBLEM SELECTOR PLAN 1
Pt w/ sepsis and blood cultures w/ gram positive cocci   TTE shows Bioprosthetic mitral valve present with a large mobile lesion (at least 2.0 cm x 1.5 cm) prolapsing into and out of the LV-LA cavity, highly suggestive of vegetation. Mean transmitral valve gradient 4.8 mmHg (at HR of 60 bpm).  S/P MUSTAPHA with vegatation - MV endocarditis  CTS consulted for consideration of re-op MVR   ID following

## 2023-09-28 NOTE — PROGRESS NOTE ADULT - PROBLEM SELECTOR PLAN 1
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 No hydrocephalus.  CT MF: no disease  needs CTA C/A/P with oral and IV contrast to eval prior aortic repair and for evidence of infection  will also need CTA brain to eval for mycotic aneurysms  will need cath  CKD with prior HD (last 2022), LUE AV graft concerning for infection, US with thrombus, vascular consulted, may need indium scan pending attending recs  will need to coordinate with nephrology regarding clearance for contrast studies, pt likely high risk of progressing to HD again and may need sonam at that time  CT surgery to follow  d/w Dr. Butler

## 2023-09-28 NOTE — PROGRESS NOTE ADULT - ASSESSMENT
63M, PMH of HTN, HLD, known to cardiac surgery for Type A dissection complicated by bowel ischemia requiring bowel resection / repeat sternotomy, CABG x 2, AVR, MVR 11/2020 with Dr. Butler, CHF (mixed systolic / diastolic), CKD (with short term on HD 2022), A.fib s/p ablation pending watchman with Dr Trevino, SDH (resolved as of July imaging). Patient presented to [facility] for 2 weeks of inability to tolerate oral intake. Denies any fever or chills. Has chronic diarrhea due to bowel resection. In the ER patient was afebrile but found to have leukocytosis to 23k. Patient was found to have SANTOS. Had a febrile episode to 102F on 9/25. Started on Vancomycin and Meropenem. Blood cultures 2 of 4 bottles with Gram positive cocci (staph epi) TTE 9/26 revealed large mobile lesion on bioprosthetic MVR, likely suggestive of vegetation. CT surgery consulted for evaluation of bioprosthetic MVR lesion, now s/p MUSTAPHA with 1.8x1 cm MV vegetation;

## 2023-09-28 NOTE — PROGRESS NOTE ADULT - SUBJECTIVE AND OBJECTIVE BOX
[facility]                                                         CARDIOLOGY AT [facility]                                                                  [address] [facility], [place] [place] [address]                                                         Telephone: [phone]. Fax:[phone]                                                                             PROGRESS NOTE    Reason for follow up:    sepsis  Update:   r/o endocarditis  MUSTAPHA 0- shows MV vegetation.  Seen by CTS pending plan  Review of symptoms:   Cardiac:  No chest pain. No dyspnea. No palpitations.  Respiratory: no cough. No dyspnea  Gastrointestinal: No diarrhea. No abdominal pain. No bleeding.   Neuro: No focal neuro complaints.    Vitals:  T(C): 36.8 (09-28-23 @ 04:33), Max: 36.8 (09-27-23 @ 16:28)  HR: 63 (09-28-23 @ 04:33) (58 - 85)  BP: 128/63 (09-28-23 @ 04:33) (109/55 - 128/63)  RR: 18 (09-28-23 @ 04:33) (14 - 18)  SpO2: 95% (09-28-23 @ 04:33) (94% - 100%)    I&O's Summary  27 Sep 2023 07:01  -  28 Sep 2023 07:00  --------------------------------------------------------  IN: 0 mL / OUT: 500 mL / NET: -500 mL  Weight (kg): 77 (09-24 @ 08:35)    PHYSICAL EXAM:  Appearance: Comfortable. No acute distress  HEENT:  Atraumatic. Normocephalic.  Normal oral mucosa  Neurologic: A & O x 3, no gross focal deficits.  Cardiovascular: Irregular S1 S2, + murmur, no rubs/gallops. No JVD  Respiratory: Lungs clear to auscultation, unlabored   Gastrointestinal:  Soft, Non-tender, + BS  Lower Extremities: + Peripheral Pulses, No clubbing, cyanosis, or edema  Psychiatry: Patient is calm. No agitation.   Skin: warm and dry.    CURRENT CARDIAC MEDICATIONS:  amLODIPine   Tablet 10 milliGRAM(s) Oral daily  carvedilol 6.25 milliGRAM(s) Oral every 12 hours    CURRENT OTHER MEDICATIONS:  vancomycin  IVPB 1000 milliGRAM(s) IV Intermittent every 48 hours  acetaminophen     Tablet .. 650 milliGRAM(s) Oral every 6 hours PRN Temp greater or equal to 38C (100.4F), Mild Pain (1 - 3)  levETIRAcetam 1000 milliGRAM(s) Oral two times a day  melatonin 3 milliGRAM(s) Oral at bedtime PRN Insomnia  ondansetron Injectable 4 milliGRAM(s) IV Push every 8 hours PRN Nausea and/or Vomiting  aluminum hydroxide/magnesium hydroxide/simethicone Suspension 30 milliLiter(s) Oral every 4 hours PRN Dyspepsia  atorvastatin 40 milliGRAM(s) Oral at bedtime  phytonadione   Solution 5 milliGRAM(s) Oral daily  sodium bicarbonate  Infusion 0.081 mEq/kG/Hr (83 mL/Hr) IV Continuous <Continuous>, Stop order after: 48 Hours  tamsulosin 0.4 milliGRAM(s) Oral at bedtime    LABS:	 	  ( 25 Sep 2023 05:50 )  Troponin T  X    ,  CPK  67   , CKMB  X    , BNP X        , ( 23 Sep 2023 16:06 )  Troponin T  0.08<H>,  CPK  X    , CKMB  X    , BNP X        , ( 23 Sep 2023 12:15 )  Troponin T  0.09<H>,  CPK  X    , CKMB  X    , BNP X                            10.8   16.33 )-----------( 136      ( 28 Sep 2023 05:40 )             33.1     09-27    131<L>  |  97  |  71.3<H>  ----------------------------<  110<H>  4.5   |  20.0<L>  |  2.24<H>    Ca    7.9<L>      27 Sep 2023 05:08  Mg     2.2     09-27      PT/INR/PTT ( 23 Sep 2023 12:15 )                       :                       :      16.6         :       33.3                  .        .                   .              .           .       1.51        .                                       TELEMETRY:   suyapa Fermin.

## 2023-09-28 NOTE — PROGRESS NOTE ADULT - ASSESSMENT
63y  Male with PMH of  HTN, HLD, Type A dissection complicated by bowel ischemia requiring bowel resection / repeat sternotomy, and CABG x2, CHF (mixed systolic / diastolic), s/p mitral / aortic valve replacement, CKD (with short term on HD 2022), A.fib s/p ablation pending watchman, SDH (resolved as of July imaging). Patient presented to [facility] for 2 weeks of inability to tolerate oral intake. Denies any fever or chills. Has chronic diarrhea due to bowel resection. In the ER patient was afebrile. Found to have leukocytosis to 23k. Patient was found to have SANTOS. Had a febrile episode to 102F on 9/25. Started on Vancomycin and Meropenem. Blood cultures 2 of 4 bottles with GPC.

## 2023-09-29 LAB
BUN SERPL-MCNC: 43.3 MG/DL — HIGH (ref 8–20)
CALCIUM SERPL-MCNC: 8.6 MG/DL — SIGNIFICANT CHANGE UP (ref 8.4–10.5)
CHLORIDE SERPL-SCNC: 101 MMOL/L — SIGNIFICANT CHANGE UP (ref 96–108)
CO2 SERPL-SCNC: 19 MMOL/L — LOW (ref 22–29)
CREAT SERPL-MCNC: 1.72 MG/DL — HIGH (ref 0.5–1.3)
CULTURE RESULTS: SIGNIFICANT CHANGE UP
EGFR: 44 ML/MIN/1.73M2 — LOW
GLUCOSE SERPL-MCNC: 109 MG/DL — HIGH (ref 70–99)
HCT VFR BLD CALC: 37 % — LOW (ref 39–50)
HGB BLD-MCNC: 12.2 G/DL — LOW (ref 13–17)
LEGIONELLA AG UR QL: NEGATIVE — SIGNIFICANT CHANGE UP
MCHC RBC-ENTMCNC: 28.2 PG — SIGNIFICANT CHANGE UP (ref 27–34)
MCHC RBC-ENTMCNC: 33 GM/DL — SIGNIFICANT CHANGE UP (ref 32–36)
MCV RBC AUTO: 85.6 FL — SIGNIFICANT CHANGE UP (ref 80–100)
ORGANISM # SPEC MICROSCOPIC CNT: SIGNIFICANT CHANGE UP
ORGANISM # SPEC MICROSCOPIC CNT: SIGNIFICANT CHANGE UP
PLATELET # BLD AUTO: 177 K/UL — SIGNIFICANT CHANGE UP (ref 150–400)
POTASSIUM SERPL-MCNC: 4 MMOL/L — SIGNIFICANT CHANGE UP (ref 3.5–5.3)
POTASSIUM SERPL-SCNC: 4 MMOL/L — SIGNIFICANT CHANGE UP (ref 3.5–5.3)
RBC # BLD: 4.32 M/UL — SIGNIFICANT CHANGE UP (ref 4.2–5.8)
RBC # FLD: 13.1 % — SIGNIFICANT CHANGE UP (ref 10.3–14.5)
SODIUM SERPL-SCNC: 133 MMOL/L — LOW (ref 135–145)
SPECIMEN SOURCE: SIGNIFICANT CHANGE UP
WBC # BLD: 18.17 K/UL — HIGH (ref 3.8–10.5)
WBC # FLD AUTO: 18.17 K/UL — HIGH (ref 3.8–10.5)

## 2023-09-29 PROCEDURE — 99233 SBSQ HOSP IP/OBS HIGH 50: CPT | Mod: FS

## 2023-09-29 PROCEDURE — 99232 SBSQ HOSP IP/OBS MODERATE 35: CPT

## 2023-09-29 PROCEDURE — 99232 SBSQ HOSP IP/OBS MODERATE 35: CPT | Mod: GC

## 2023-09-29 PROCEDURE — 99233 SBSQ HOSP IP/OBS HIGH 50: CPT

## 2023-09-29 PROCEDURE — 99231 SBSQ HOSP IP/OBS SF/LOW 25: CPT | Mod: FS

## 2023-09-29 RX ADMIN — ATORVASTATIN CALCIUM 40 MILLIGRAM(S): 80 TABLET, FILM COATED ORAL at 21:07

## 2023-09-29 RX ADMIN — Medication 100 MILLIGRAM(S): at 03:24

## 2023-09-29 RX ADMIN — Medication 2000 MILLIGRAM(S): at 13:59

## 2023-09-29 RX ADMIN — LEVETIRACETAM 1000 MILLIGRAM(S): 250 TABLET, FILM COATED ORAL at 17:40

## 2023-09-29 RX ADMIN — LEVETIRACETAM 1000 MILLIGRAM(S): 250 TABLET, FILM COATED ORAL at 05:16

## 2023-09-29 RX ADMIN — AMLODIPINE BESYLATE 10 MILLIGRAM(S): 2.5 TABLET ORAL at 05:15

## 2023-09-29 RX ADMIN — Medication 100 MILLIGRAM(S): at 13:59

## 2023-09-29 RX ADMIN — Medication 2000 MILLIGRAM(S): at 05:15

## 2023-09-29 RX ADMIN — CARVEDILOL PHOSPHATE 6.25 MILLIGRAM(S): 80 CAPSULE, EXTENDED RELEASE ORAL at 17:40

## 2023-09-29 RX ADMIN — TAMSULOSIN HYDROCHLORIDE 0.4 MILLIGRAM(S): 0.4 CAPSULE ORAL at 21:06

## 2023-09-29 RX ADMIN — CARVEDILOL PHOSPHATE 6.25 MILLIGRAM(S): 80 CAPSULE, EXTENDED RELEASE ORAL at 05:15

## 2023-09-29 RX ADMIN — Medication 2000 MILLIGRAM(S): at 21:06

## 2023-09-29 NOTE — PROGRESS NOTE ADULT - ASSESSMENT
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 Patient presented to [facility] for 2 weeks of inability to tolerate oral intake. Denies any fever or chills. Has chronic diarrhea due to bowel resection. In the ER patient was afebrile. Found to have leukocytosis to 23k. Patient was found to have SANTOS. Had a febrile episode to 102F on 9/25. Started on Vancomycin and Meropenem. Blood cultures 1 of 4 bottles with Staphylococcus epidermidis      Prosthetic MV endocarditis   Staphylococcus epidermidis bacteremia  Fever  Leukocytosis   SANTOS   PCN allergy       - Blood cultures 9/23, 9/25 reporting Staphylococcus epidermidis  - Repeat blood cultures  9/27 no growth   - RVP/COVID 19 PCR 9/25 negative   - CT A/P 9/23 reporting no acute findings   - CXR 9/23 no PNA  - TTE reporting MV veg  - MUSTAPHA reporting large mobile density MV  - UA 9/25 negative for UTI   - Procalcitonin level not reliable in SANTOS   - PCN allergy but has tolerated Zosyn and Zinacef in the past.   - Continue Cefazolin   - Due to renal failure will hold off on Gentamicin  - Due to Eliquis unable to use Rifampin   - Follow up cultures  - Trend Fever  - Trend WBC      Will Follow    Discussed treatment plan with: medical team, clinical pharmacy and CT Surgery

## 2023-09-29 NOTE — PROGRESS NOTE ADULT - ASSESSMENT
63y  Male with PMH of  HTN, HLD, Type A dissection complicated by bowel ischemia requiring bowel resection / repeat sternotomy, and CABG x2, CHF (mixed systolic / diastolic), s/p mitral / aortic valve replacement, CKD (with short term on HD 2022), A.fib s/p ablation pending watchman, SDH (resolved as of July imaging). Patient presented to [facility] for 2 weeks of inability to tolerate oral intake.

## 2023-09-29 NOTE — PROGRESS NOTE ADULT - ASSESSMENT
63M, PMH of HTN, HLD, known to cardiac surgery for Type A dissection complicated by bowel ischemia requiring bowel resection / repeat sternotomy, CABG x 2, AVR, MVR 11/2020 with Dr. Butler, CHF (mixed systolic / diastolic), CKD (with short term on HD 2022), A.fib s/p ablation pending watchman with Dr Trevino, SDH (resolved as of July imaging). Patient presented to [facility] for 2 weeks of inability to tolerate oral intake. Denies any fever or chills. Has chronic diarrhea due to bowel resection. In the ER patient was afebrile but found to have leukocytosis to 23k. Patient was found to have SANTOS. Had a febrile episode to 102F on 9/25. Started on Vancomycin and Meropenem. Blood cultures 2 of 4 bottles with Gram positive cocci (staph epi) TTE 9/26 revealed large mobile lesion on bioprosthetic MVR, likely suggestive of vegetation. CT surgery consulted for evaluation of bioprosthetic MVR lesion, now s/p MUSTAPHA with 1.8x1 cm MV vegetation;

## 2023-09-29 NOTE — PROGRESS NOTE ADULT - SUBJECTIVE AND OBJECTIVE BOX
NEPHROLOGY INTERVAL HPI/OVERNIGHT EVENTS:    Examined  No distress  feeling better  denies HA CP no SOB    MEDICATIONS  (STANDING):  amLODIPine   Tablet 10 milliGRAM(s) Oral daily  atorvastatin 40 milliGRAM(s) Oral at bedtime  carvedilol 6.25 milliGRAM(s) Oral every 12 hours  ceFAZolin  Injectable. 2000 milliGRAM(s) IV Push every 8 hours  levETIRAcetam 1000 milliGRAM(s) Oral two times a day  sodium bicarbonate  Infusion 0.081 mEq/kG/Hr (83 mL/Hr) IV Continuous <Continuous>  tamsulosin 0.4 milliGRAM(s) Oral at bedtime    MEDICATIONS  (PRN):  acetaminophen     Tablet .. 650 milliGRAM(s) Oral every 6 hours PRN Temp greater or equal to 38C (100.4F), Mild Pain (1 - 3)  aluminum hydroxide/magnesium hydroxide/simethicone Suspension 30 milliLiter(s) Oral every 4 hours PRN Dyspepsia  guaiFENesin Oral Liquid (Sugar-Free) 100 milliGRAM(s) Oral every 6 hours PRN Cough  melatonin 3 milliGRAM(s) Oral at bedtime PRN Insomnia  ondansetron Injectable 4 milliGRAM(s) IV Push every 8 hours PRN Nausea and/or Vomiting      Allergies    penicillin (Other)    Intolerances        Vital Signs Last 24 Hrs  T(C): 36.8 (29 Sep 2023 12:28), Max: 36.8 (28 Sep 2023 16:07)  T(F): 98.2 (29 Sep 2023 12:28), Max: 98.3 (28 Sep 2023 16:07)  HR: 70 (29 Sep 2023 12:28) (59 - 70)  BP: 147/66 (29 Sep 2023 12:28) (112/55 - 147/66)  BP(mean): --  RR: 18 (29 Sep 2023 12:28) (18 - 18)  SpO2: 97% (29 Sep 2023 12:28) (97% - 97%)    Parameters below as of 29 Sep 2023 12:28  Patient On (Oxygen Delivery Method): room air      PHYSICAL EXAM:  GENERAL: NAD  EYES: EOMI  NECK: Supple  NERVOUS SYSTEM:  A/O x3  CHEST:  No rales   HEART:  RRR, No murmur  ABDOMEN: Soft, NT/ND BS+  EXTREMITIES:  No Edema    LABS:                        12.2   18.17 )-----------( 177      ( 29 Sep 2023 12:15 )             37.0     09-29    133<L>  |  101  |  43.3<H>  ----------------------------<  109<H>  4.0   |  19.0<L>  |  1.72<H>    Ca    8.6      29 Sep 2023 12:15        Urinalysis Basic - ( 29 Sep 2023 12:15 )    Color: x / Appearance: x / SG: x / pH: x  Gluc: 109 mg/dL / Ketone: x  / Bili: x / Urobili: x   Blood: x / Protein: x / Nitrite: x   Leuk Esterase: x / RBC: x / WBC x   Sq Epi: x / Non Sq Epi: x / Bacteria: x              RADIOLOGY & ADDITIONAL TESTS:

## 2023-09-29 NOTE — PROGRESS NOTE ADULT - ASSESSMENT
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 Labs consistent with acute renal failure likely in the setting of GI losses/ Poor PO intake, however patient with known HF (last EF 55-60% on MUSTAPHA), given 1L LR in ED, will continue with gentle fluid resuscitation with NS 50 cc/hr and monitor fluid balance/ Renal function. nephrology / cardiology consulted. Blood cultures growing staph epiderdimitis, ECHO showed large mitral valve vegetation, TTE 9/27 -       # sepsis with staphylococcal epididermiditis bacteremia ? source   f/u repeat blood cx  Pt hs Aortic graft and dual valve replaced   s/p MUSTAPHA -  ID and cardio following, cardiothoracic surgery following - await recs   CT A/P shows no acute pathology - Redemonstration on aortic dissection - findings discussed with Radiologist - Dr Lacy- appears stable as compared to prior imaging in 5/2023 .   -Repeat Blood cultures   - elevated vanc trough- likely incorrect as it was drawn during infusion  - as per ID Start Cefazolin, vanc dc  - as per CT surgery- **will hold on contrast studies at this time due to , pt likely high risk of progressing to HD again  if cultures remain negative, may consider treating pt with 4 weeks of IV antibiotics and repeat MUSTAPHA at that time to see if vegetation improving as pt is high risk for surgery   - Patient is scheduled for the fistula revision this upcoming cuca 10/01- as per cardio cleared      #Acute Renal Failure, associated metabolic acidosis   Likely Pre renal, in setting of GI losses / Poor PO intake   - Cr 3.5, baseline from last DC appears to be ~ 1.5-1.8, however patient did need short term HD in the recent past   - Nephrology following     #Intractable Nausea / Vomiting   - resolved at this time      #Heart Failure with Preserved Ejection Fraction   Follows Dr. Frankel   - Continues on Coreg, will hold torsemide in light of likely dehydration / hyponatremia, resume when clinically appropriate   - MUSTAPHA - EF 55-60%      #Elevated Trp   Likely in setting of renal failure       #Hyponatremia   - repeat labs pending      #paroxysmal Atrial fibrillation   - Rate controlled currently, continue BB, monitor on Tele   - Stopped briefly AC due to SDH in recent past, however CT head 7/2023 shows complete resolution of SDH, resumed AC outpatient per Cardiology, planned for Watchman procedure this coming Oct  - AC on hold currently    #history of SDH   As above, CT head showed complete resolution of recent SDH, continues on AC     #Mitral/Aortic valve replacement   -Recent MUSTAPHA shows bioprosthetic valves with only moderate paravalvular leak in MV.     DVT prophylaxis:SCD  Medically acute

## 2023-09-29 NOTE — PROGRESS NOTE ADULT - SUBJECTIVE AND OBJECTIVE BOX
Subjective:    T(C): 36.4 (09-29-23 @ 05:09), Max: 37 (09-28-23 @ 11:10)  HR: 59 (09-29-23 @ 05:09) (59 - 66)  BP: 112/55 (09-29-23 @ 05:09) (112/55 - 131/68)  RR: 18 (09-29-23 @ 05:09) (18 - 19)  SpO2: 97% (09-29-23 @ 05:09) (97% - 97%)               10.8   16.33 )-----------( 136      ( 28 Sep 2023 05:40 )             33.1          I&O's Detail    27 Sep 2023 07:01  -  28 Sep 2023 07:00  --------------------------------------------------------  IN:  Total IN: 0 mL    OUT:    Voided (mL): 500 mL  Total OUT: 500 mL  Total NET: -500 mL    Drug Dosing Weight  Height (cm): 172.7 (23 Sep 2023 10:08)  Weight (kg): 77 (24 Sep 2023 08:35)  BMI (kg/m2): 25.8 (24 Sep 2023 08:35)  BSA (m2): 1.91 (24 Sep 2023 08:35)    MEDICATIONS  (STANDING):  amLODIPine   Tablet 10 milliGRAM(s) Oral daily  atorvastatin 40 milliGRAM(s) Oral at bedtime  carvedilol 6.25 milliGRAM(s) Oral every 12 hours  ceFAZolin  Injectable. 2000 milliGRAM(s) IV Push every 8 hours  levETIRAcetam 1000 milliGRAM(s) Oral two times a day  phytonadione   Solution 5 milliGRAM(s) Oral daily  sodium bicarbonate  Infusion 0.081 mEq/kG/Hr (83 mL/Hr) IV Continuous <Continuous>  tamsulosin 0.4 milliGRAM(s) Oral at bedtime    MEDICATIONS  (PRN):  acetaminophen     Tablet .. 650 milliGRAM(s) Oral every 6 hours PRN Temp greater or equal to 38C (100.4F), Mild Pain (1 - 3)  aluminum hydroxide/magnesium hydroxide/simethicone Suspension 30 milliLiter(s) Oral every 4 hours PRN Dyspepsia  guaiFENesin Oral Liquid (Sugar-Free) 100 milliGRAM(s) Oral every 6 hours PRN Cough  melatonin 3 milliGRAM(s) Oral at bedtime PRN Insomnia  ondansetron Injectable 4 milliGRAM(s) IV Push every 8 hours PRN Nausea and/or Vomiting    Physical Exam  Gen:   Neuro:   Pulm:   CV:   Abd:   Extrem/MS:          Subjective: no complaints denies CP, palpitations, SOB, cough, fever, chills, itchiness/rash, diaphoresis, vision changes, HA, dizziness/lightheadedness, numbness/tingling, abd pain, N/V     T(C): 36.4 (09-29-23 @ 05:09), Max: 37 (09-28-23 @ 11:10)  HR: 59 (09-29-23 @ 05:09) (59 - 66)  BP: 112/55 (09-29-23 @ 05:09) (112/55 - 131/68)  RR: 18 (09-29-23 @ 05:09) (18 - 19)  SpO2: 97% (09-29-23 @ 05:09) (97% - 97%)               10.8   16.33 )-----------( 136      ( 28 Sep 2023 05:40 )             33.1          I&O's Detail    27 Sep 2023 07:01  -  28 Sep 2023 07:00  --------------------------------------------------------  IN:  Total IN: 0 mL    OUT:    Voided (mL): 500 mL  Total OUT: 500 mL  Total NET: -500 mL    Drug Dosing Weight  Height (cm): 172.7 (23 Sep 2023 10:08)  Weight (kg): 77 (24 Sep 2023 08:35)  BMI (kg/m2): 25.8 (24 Sep 2023 08:35)  BSA (m2): 1.91 (24 Sep 2023 08:35)    MEDICATIONS  (STANDING):  amLODIPine   Tablet 10 milliGRAM(s) Oral daily  atorvastatin 40 milliGRAM(s) Oral at bedtime  carvedilol 6.25 milliGRAM(s) Oral every 12 hours  ceFAZolin  Injectable. 2000 milliGRAM(s) IV Push every 8 hours  levETIRAcetam 1000 milliGRAM(s) Oral two times a day  phytonadione   Solution 5 milliGRAM(s) Oral daily  sodium bicarbonate  Infusion 0.081 mEq/kG/Hr (83 mL/Hr) IV Continuous <Continuous>  tamsulosin 0.4 milliGRAM(s) Oral at bedtime    MEDICATIONS  (PRN):  acetaminophen     Tablet .. 650 milliGRAM(s) Oral every 6 hours PRN Temp greater or equal to 38C (100.4F), Mild Pain (1 - 3)  aluminum hydroxide/magnesium hydroxide/simethicone Suspension 30 milliLiter(s) Oral every 4 hours PRN Dyspepsia  guaiFENesin Oral Liquid (Sugar-Free) 100 milliGRAM(s) Oral every 6 hours PRN Cough  melatonin 3 milliGRAM(s) Oral at bedtime PRN Insomnia  ondansetron Injectable 4 milliGRAM(s) IV Push every 8 hours PRN Nausea and/or Vomiting    Physical Exam  Gen: NAD  Neuro: A&Ox3 non focal speech clear and intact  Pulm: CTA b/l no wheezing  CV: S1S2 RRR  Abd: +BS soft NT ND  Extrem/MS: no edema/cyanosis CORDOVA, L forearm fistula site with erythema, warm, tender to light touch

## 2023-09-29 NOTE — PROGRESS NOTE ADULT - SUBJECTIVE AND OBJECTIVE BOX
VASCULAR SURGERY PROGRESS NOTE    Subjective:   Patient examined at bedside this AM. Reports he is feeling well this morning. No fevers or chills overnight.     Objective:  Vital Signs  T(C): 36.4 (09-29 @ 05:09), Max: 37 (09-28 @ 11:10)  HR: 59 (09-29 @ 05:09) (59 - 66)  BP: 112/55 (09-29 @ 05:09) (112/55 - 131/68)  RR: 18 (09-29 @ 05:09) (18 - 19)  SpO2: 97% (09-29 @ 05:09) (97% - 97%)    Physical Exam:  General: alert and oriented, NAD  Resp: airway patent, respirations unlabored  Extremities: no edema, LUE AVG thrombosed, arm tender to palpation  Skin: warm, dry, appropriate color

## 2023-09-29 NOTE — PROGRESS NOTE ADULT - ASSESSMENT
Assessment:   62yo Male admitted for sepsis with strep epi bacteremia with concern that patient's AVG is source of infection. Patient has not received HD in over 1 year and graft is thrombosed at this time.     Plan:   - Cont to hold eliquis  - Please document cardiac risk stratification  - Plan for graft explantation vs indium scan     Assessment:   64yo Male admitted for sepsis with strep epi bacteremia with concern that patient's AVG is source of infection. Patient has not received HD in over 1 year and graft is thrombosed at this time.     Plan:   - Cont to hold eliquis  - Please document cardiac risk stratification  - Plan for graft explantation on Sunday, will preop on Saturday  -Remainder of care per primary

## 2023-09-29 NOTE — PROGRESS NOTE ADULT - SUBJECTIVE AND OBJECTIVE BOX
[facility]  INFECTIOUS DISEASES at [place] / [place] / [place]  =======================================================                               Roosevelt Victor MD#   Fabián Quigley MD*                             Valentine Handley MD*   Reba Catalan MD*                              Professor Emeritus:  Dr Johnny Hathaway MD^            Diplomates American Board of Internal Medicine & Infectious Diseases                # [place] Office - Appt - Tel  [phone] Fax [phone]                * [place] Office - Appt - Tel [phone] Fax [phone]                      ^[place] Office - Tel  [phone] Fax [phone]                                  Hospital Consult line:  [phone]  =======================================================    JONATHAN GIBSON [id number]    Follow up: MV Endocarditis     no fevers       Allergies:  penicillin (Other)       REVIEW OF SYSTEMS:  CONSTITUTIONAL:  No Fever or chills  HEENT:  No diplopia or blurred vision.  No earache, sore throat or runny nose.  CARDIOVASCULAR:  No chest pain  RESPIRATORY:  No cough, shortness of breath  GASTROINTESTINAL:  No nausea, vomiting or diarrhea.  GENITOURINARY:  No dysuria, frequency or urgency. No Blood in urine  MUSCULOSKELETAL:  no joint aches, no muscle pain  SKIN:  No change in skin, hair or nails.  NEUROLOGIC:  No Headaches      Physical Exam:  GEN: NAD  HEENT: normocephalic and atraumatic. EOMI. PERRL.    NECK: Supple.   LUNGS: CTA B/L.  HEART: RRR  ABDOMEN: Soft, NT, ND.  +BS.    : No CVA tenderness  EXTREMITIES: Without  edema.  MSK: No joint swelling  NEUROLOGIC: No Focal Deficits       Vitals:  T(F): 97.5 (29 Sep 2023 05:09), Max: 98.3 (28 Sep 2023 16:07)  HR: 59 (29 Sep 2023 05:09)  BP: 112/55 (29 Sep 2023 05:09)  RR: 18 (29 Sep 2023 05:09)  SpO2: 97% (29 Sep 2023 05:09) (97% - 97%)  temp max in last 48H T(F): , Max: 98.6 (09-28-23 @ 11:10)    Current Antibiotics:  ceFAZolin  Injectable. 2000 milliGRAM(s) IV Push every 8 hours    Other medications:  amLODIPine   Tablet 10 milliGRAM(s) Oral daily  atorvastatin 40 milliGRAM(s) Oral at bedtime  carvedilol 6.25 milliGRAM(s) Oral every 12 hours  levETIRAcetam 1000 milliGRAM(s) Oral two times a day  sodium bicarbonate  Infusion 0.081 mEq/kG/Hr IV Continuous <Continuous>  tamsulosin 0.4 milliGRAM(s) Oral at bedtime                            10.8   16.33 )-----------( 136      ( 28 Sep 2023 05:40 )             33.1           RECENT CULTURES:  09-27 @ 14:47 .Blood Blood-Peripheral     No growth at 24 hours    09-27 @ 14:42 .Blood Blood-Peripheral     No growth at 24 hours    09-25 @ 06:50 .Stool Feces     No enteric pathogens isolated.  (Stool culture examined for Salmonella,  Shigella, Campylobacter, Aeromonas, Plesiomonas,  Vibrio, E.coli O157 and Yersinia)    09-25 @ 06:00    RVP  NotDetec    09-25 @ 05:50 .Blood Blood-Peripheral Staphylococcus epidermidis    Growth in aerobic bottle: Staphylococcus epidermidis  Growth in anaerobic bottle: Gram Positive Cocci in Clusters  Growth in aerobic bottle: Gram Positive Cocci in Clusters  Growth in anaerobic bottle: Gram Positive Cocci in Clusters    09-25 @ 05:13 .Blood Blood-Peripheral     Growth in aerobic bottle: Staphylococcus epidermidis  "Susceptibilities not performed"  Growth in aerobic bottle: Gram Positive Cocci in Clusters    09-25 @ 01:00 Clean Catch Clean Catch (Midstream)     <10,000 CFU/mL Normal Urogenital Sivan    09-23 @ 19:30 .Blood Blood-Peripheral     Growth in aerobic and anaerobic bottles: Staphylococcus epidermidis  "Susceptibilities not performed"  Growth in aerobic bottle: Gram Positive Cocci in Clusters  Growth in anaerobic bottle: Gram Positive Cocci in Clusters    09-23 @ 19:25 .Blood Blood-Peripheral Blood Culture PCR  Staphylococcus epidermidis    Growth in aerobic and anaerobic bottles: Staphylococcus epidermidis  Direct identification is available within approximately 3-5  hours either by Blood Panel Multiplexed PCR or Direct  MALDI-TOF. Details: [email or web address]  Growth in aerobic bottle: Gram Positive Cocci in Clusters  Growth in anaerobic bottle: Gram Positive Cocci in Clusters      WBC Count: 16.33 K/uL (09-28-23 @ 05:40)  WBC Count: 16.72 K/uL (09-27-23 @ 05:08)  WBC Count: 18.81 K/uL (09-26-23 @ 06:26)  WBC Count: 18.52 K/uL (09-25-23 @ 05:50)    Creatinine: 2.24 mg/dL (09-27-23 @ 05:08)  Creatinine: 2.27 mg/dL (09-26-23 @ 10:50)  Creatinine: 1.79 mg/dL (09-26-23 @ 06:26)  Creatinine: 2.79 mg/dL (09-25-23 @ 05:50)    Procalcitonin, Serum: 1.75 ng/mL (09-25-23 @ 05:50)     SARS-CoV-2: NotDetec (09-25-23 @ 06:00)    Vancomycin Level, Trough: 28.2 ug/mL (09-28-23 @ 05:40)  Vancomycin Level, Random: 19.8 ug/mL (09-27-23 @ 05:08)          < from: MUSTAPHA Echo Doppler (09.27.23 @ 10:59) >  Summary:   1. Left ventricular ejection fraction, by visual estimation, is 55 to 60%.   2. Severely enlarged left atrium.   3. Mild mitral valve regurgitation.   4. Mitral prosthesis vegetation.   5. There is a bioprosthetic valve in the mitral position with thickened   leaflets yet leaflet mobility is fairly preserved. There is trace to mild   valvular regurgitation with no perivalvular leaks. No rocking motion or   dehiscence noted. There is a large and mobile vegetation measuring   1.8x1.0 cm attached to the atrial surface of the medial bioprosthetic   leaflet. There is evidence of at least mild valve stenosis with a mean   transvalvular gradient of 5 mmHg at a heart rate of 60 bpm. The mitral   valve area by 3D is 1.57 cm2.   6. Mild tricuspid regurgitation.   7. Bioprosthesis in the aortic position.   8. Dilatation of the aortic root.    < end of copied text >      < from: TTE Echo Complete w/o Contrast w/ Doppler (09.26.23 @ 14:32) >  Summary:   1. Left ventricular ejection fraction, by visual estimation, is 50 to   55%.   2. Normal global left ventricular systolic function.   3. Diastolic function indeterminate.   4. Abnormal septal motion consistent with post-operative status.   5. Normal RV size with mildly reduced RV systolic function, inadequate   estimation of RVSP.   6. Bioprosthetic mitral valve present with a large mobile lesion (at   least 2.0 cm x 1.5 cm) prolapsing into and out of the LV-LA cavity,   hightly suggestive of vegetation. Mean transmitral valve gradient 4.8   mmHg (at HR of 60 bpm).   7. Bioprosthesis in the aortic position, mean gradient of 9 mmHg.   8. There is no evidence of pericardial effusion.   9. Compared to the prior TTE study from 5/23/23, lesion over bioMVR is   again noted and is now much larger and recommend MUSTAPHA study to confirm   vegetation and exclude other valvular involvement.    < end of copied text >

## 2023-09-29 NOTE — PROGRESS NOTE ADULT - ASSESSMENT
Improving  SANTOS on CKD stage III serum cr improving   - Baseline 1.6 ---> H/O temporary HD ending in Nov 2021 --> was on HD x 1 year   Presented with GI losses  ? Viral enteritis   ID eval appreciated    Recent ECHO and MUSTAPHA noted - Large BPV vegetation     No hydronephrosis on CT 9/23   CPK 67   UDS negative   Thrombosed AVG in L arm , confirmed w/ doppler     MA - likely 2/2 CKD cont IVF w added bicarb  Mild hyponatremia persisting    Renally dose meds  Avoid nephrotoxic agents  AM labs will follow

## 2023-09-29 NOTE — PROGRESS NOTE ADULT - SUBJECTIVE AND OBJECTIVE BOX
CC: Patient is a 63y old  Male who presents with a chief complaint of Intractable N/V, SANTOS (29 Sep 2023 10:10)      Patient seen and examined at bedside, No acute overnight events.        Vital Sign  Vital Signs Last 24 Hrs  T(C): 36.4 (29 Sep 2023 05:09), Max: 37 (28 Sep 2023 11:10)  T(F): 97.5 (29 Sep 2023 05:09), Max: 98.6 (28 Sep 2023 11:10)  HR: 59 (29 Sep 2023 05:09) (59 - 66)  BP: 112/55 (29 Sep 2023 05:09) (112/55 - 131/68)  BP(mean): --  RR: 18 (29 Sep 2023 05:09) (18 - 19)  SpO2: 97% (29 Sep 2023 05:09) (97% - 97%)    Parameters below as of 29 Sep 2023 05:09  Patient On (Oxygen Delivery Method): room air        Physical Exam:   Gen: NAD  HEENT: NCAT, EOMI, PERRLA, Pupils_  CVS: RRR, +S1/S2, no murmurs, rubs or gallops appreciated  Lungs: CTAB, no wheeze, rales, rhonchi  Abdomen: +BS, soft, ND, NT. no palpable flank tenderness or mass, no CVA tenderness  Ext: No cyanosis, edema or calf tenderness  Neuro: AAOx3, no focal deficits.    Labs:                        10.8   16.33 )-----------( 136      ( 28 Sep 2023 05:40 )             33.1             Radiology:  *Pull    Medications:  MEDICATIONS  (STANDING):  amLODIPine   Tablet 10 milliGRAM(s) Oral daily  atorvastatin 40 milliGRAM(s) Oral at bedtime  carvedilol 6.25 milliGRAM(s) Oral every 12 hours  ceFAZolin  Injectable. 2000 milliGRAM(s) IV Push every 8 hours  levETIRAcetam 1000 milliGRAM(s) Oral two times a day  sodium bicarbonate  Infusion 0.081 mEq/kG/Hr (83 mL/Hr) IV Continuous <Continuous>  tamsulosin 0.4 milliGRAM(s) Oral at bedtime    MEDICATIONS  (PRN):  acetaminophen     Tablet .. 650 milliGRAM(s) Oral every 6 hours PRN Temp greater or equal to 38C (100.4F), Mild Pain (1 - 3)  aluminum hydroxide/magnesium hydroxide/simethicone Suspension 30 milliLiter(s) Oral every 4 hours PRN Dyspepsia  guaiFENesin Oral Liquid (Sugar-Free) 100 milliGRAM(s) Oral every 6 hours PRN Cough  melatonin 3 milliGRAM(s) Oral at bedtime PRN Insomnia  ondansetron Injectable 4 milliGRAM(s) IV Push every 8 hours PRN Nausea and/or Vomiting

## 2023-09-29 NOTE — PROGRESS NOTE ADULT - SUBJECTIVE AND OBJECTIVE BOX
[facility]                                                         CARDIOLOGY AT [facility]                                                                  [address], [place] [address]                                                         Telephone: [phone]. Fax:[phone]                                                                             PROGRESS NOTE    Reason for follow up:   Sepsis - Endocarditis  Update:   Vascular concerned that Graft is source of infection and plans on removing on Monday  Review of symptoms:   Cardiac:  No chest pain. No dyspnea. No palpitations.  Respiratory: no cough. No dyspnea  Gastrointestinal: No diarrhea. No abdominal pain. No bleeding.   Neuro: No focal neuro complaints.    Vitals:  T(C): 36.4 (09-29-23 @ 05:09), Max: 37 (09-28-23 @ 11:10)  HR: 59 (09-29-23 @ 05:09) (59 - 66)  BP: 112/55 (09-29-23 @ 05:09) (112/55 - 131/68)  RR: 18 (09-29-23 @ 05:09) (18 - 19)  SpO2: 97% (09-29-23 @ 05:09) (97% - 97%)  I&O's Summary    PHYSICAL EXAM:  Appearance: Comfortable. No acute distress  HEENT:  Atraumatic. Normocephalic.  Normal oral mucosa  Neurologic: A & O x 3, no gross focal deficits.  Cardiovascular: RRR S1 S2, No murmur, no rubs/gallops. No JVD  Respiratory: Lungs clear to auscultation, unlabored   Gastrointestinal:  Soft, Non-tender, + BS  Lower Extremities: 2+ Peripheral Pulses, No clubbing, cyanosis, or edema  Psychiatry: Patient is calm. No agitation.   Skin: warm and dry.    CURRENT CARDIAC MEDICATIONS:  amLODIPine   Tablet 10 milliGRAM(s) Oral daily  carvedilol 6.25 milliGRAM(s) Oral every 12 hours    CURRENT OTHER MEDICATIONS:  guaiFENesin Oral Liquid (Sugar-Free) 100 milliGRAM(s) Oral every 6 hours PRN Cough  ceFAZolin  Injectable. 2000 milliGRAM(s) IV Push every 8 hours  acetaminophen     Tablet .. 650 milliGRAM(s) Oral every 6 hours PRN Temp greater or equal to 38C (100.4F), Mild Pain (1 - 3)  levETIRAcetam 1000 milliGRAM(s) Oral two times a day  melatonin 3 milliGRAM(s) Oral at bedtime PRN Insomnia  ondansetron Injectable 4 milliGRAM(s) IV Push every 8 hours PRN Nausea and/or Vomiting  aluminum hydroxide/magnesium hydroxide/simethicone Suspension 30 milliLiter(s) Oral every 4 hours PRN Dyspepsia  atorvastatin 40 milliGRAM(s) Oral at bedtime  sodium bicarbonate  Infusion 0.081 mEq/kG/Hr (83 mL/Hr) IV Continuous <Continuous>, Stop order after: 48 Hours  tamsulosin 0.4 milliGRAM(s) Oral at bedtime    LABS:	 	  ( 25 Sep 2023 05:50 )  Troponin T  X    ,  CPK  67   , CKMB  X    , BNP X        , ( 23 Sep 2023 16:06 )  Troponin T  0.08<H>,  CPK  X    , CKMB  X    , BNP X        , ( 23 Sep 2023 12:15 )  Troponin T  0.09<H>,  CPK  X    , CKMB  X    , BNP X                              10.8   16.33 )-----------( 136      ( 28 Sep 2023 05:40 )             33.1       PT/INR/PTT ( 23 Sep 2023 12:15 )                       :                       :      16.6         :       33.3                  .        .                   .              .           .       1.51        .                                       TELEMETRY:

## 2023-09-29 NOTE — PROGRESS NOTE ADULT - PROBLEM SELECTOR PLAN 1
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 No hydrocephalus.  CT MF: no disease  needs CTA C/A/P with oral and IV contrast to eval prior aortic repair and for evidence of infection  will also need CTA brain to eval for mycotic aneurysms  will need cath  CKD with prior HD (last 2022), LUE AV graft concerning for infection, US with thrombus, vascular consulted, may need indium scan, pending further recs  will need to coordinate with nephrology regarding clearance for contrast studies, pt likely high risk of progressing to HD again and may need sonam at that time  CT surgery to follow  d/w Dr. 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 No hydrocephalus.  CT MF: no disease  CKD with prior HD (last 2022), LUE AV graft concerning for infection, US with thrombus, vascular consulted, may need indium scan, pending further recs  ***will hold on contrast studies at this time due to , pt likely high risk of progressing to HD again  if cultures remain negative, may consider treating pt with 4 weeks of IV antibiotics and repeat MUSTAPHA at that time to see if vegetation improving as pt is high risk for surgery   CT surgery to follow  d/w Dr. 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 No hydrocephalus.  CT MF: no disease  CKD with prior HD (last 2022), LUE AV graft concerning for infection, US with thrombus, vascular consult appreciated, plan for fistula excision sun vs mon  ***will hold on contrast studies at this time due to high risk of progressing to HD again  if cultures remain negative, may consider treating pt with 4 weeks of IV antibiotics and repeat MUSTAPHA at that time to see if vegetation improving as pt is high risk for surgery   CT surgery to follow  d/w Dr. Butler

## 2023-09-29 NOTE — PROGRESS NOTE ADULT - PROBLEM SELECTOR PLAN 1
Presented with sepsis and + blood cultures of gram positive cocci  TTE shows Bioprosthetic mitral valve present with a large mobile lesion (at least 2.0 cm x 1.5 cm) prolapsing into and out of the LV-LA cavity, highly suggestive of vegetation. Mean transmitral valve gradient 4.8 mmHg (at HR of 60 bpm)  S/P MUSTAPHA with vegatation - MV endocarditis  CTS consulted for consideration of re-op MVR   ID following.  LUE AV graft concerning for infection source and - Plan for graft explantation on Sunday as per vascular  Vascular requesting Cardiac risk stratification  Patient with multple cormobities.  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   On exam VS stable, skin is warm and dry and is euvolemic on exam.  Patiennt is optimized with an elevated risk.     5 points  Class IV Risk  15.0 %  30-day risk of death, MI, or cardiac arrest  Wait for MD for finale clarification  ,

## 2023-09-30 ENCOUNTER — TRANSCRIPTION ENCOUNTER (OUTPATIENT)
Age: 63
End: 2023-09-30

## 2023-09-30 LAB
ANION GAP SERPL CALC-SCNC: 13 MMOL/L — SIGNIFICANT CHANGE UP (ref 5–17)
BUN SERPL-MCNC: 40.4 MG/DL — HIGH (ref 8–20)
CALCIUM SERPL-MCNC: 8.3 MG/DL — LOW (ref 8.4–10.5)
CHLORIDE SERPL-SCNC: 99 MMOL/L — SIGNIFICANT CHANGE UP (ref 96–108)
CO2 SERPL-SCNC: 17 MMOL/L — LOW (ref 22–29)
CREAT SERPL-MCNC: 1.97 MG/DL — HIGH (ref 0.5–1.3)
EGFR: 38 ML/MIN/1.73M2 — LOW
GLUCOSE SERPL-MCNC: 103 MG/DL — HIGH (ref 70–99)
HCT VFR BLD CALC: 37.3 % — LOW (ref 39–50)
HGB BLD-MCNC: 12 G/DL — LOW (ref 13–17)
MCHC RBC-ENTMCNC: 28 PG — SIGNIFICANT CHANGE UP (ref 27–34)
MCHC RBC-ENTMCNC: 32.2 GM/DL — SIGNIFICANT CHANGE UP (ref 32–36)
MCV RBC AUTO: 86.9 FL — SIGNIFICANT CHANGE UP (ref 80–100)
PLATELET # BLD AUTO: 182 K/UL — SIGNIFICANT CHANGE UP (ref 150–400)
POTASSIUM SERPL-MCNC: 3.8 MMOL/L — SIGNIFICANT CHANGE UP (ref 3.5–5.3)
POTASSIUM SERPL-SCNC: 3.8 MMOL/L — SIGNIFICANT CHANGE UP (ref 3.5–5.3)
RBC # BLD: 4.29 M/UL — SIGNIFICANT CHANGE UP (ref 4.2–5.8)
RBC # FLD: 13.2 % — SIGNIFICANT CHANGE UP (ref 10.3–14.5)
SODIUM SERPL-SCNC: 129 MMOL/L — LOW (ref 135–145)
WBC # BLD: 20.03 K/UL — HIGH (ref 3.8–10.5)
WBC # FLD AUTO: 20.03 K/UL — HIGH (ref 3.8–10.5)

## 2023-09-30 PROCEDURE — 99232 SBSQ HOSP IP/OBS MODERATE 35: CPT | Mod: 57

## 2023-09-30 PROCEDURE — 99232 SBSQ HOSP IP/OBS MODERATE 35: CPT

## 2023-09-30 PROCEDURE — 99233 SBSQ HOSP IP/OBS HIGH 50: CPT | Mod: FS

## 2023-09-30 PROCEDURE — 99233 SBSQ HOSP IP/OBS HIGH 50: CPT

## 2023-09-30 RX ORDER — SODIUM BICARBONATE 1 MEQ/ML
650 SYRINGE (ML) INTRAVENOUS EVERY 8 HOURS
Refills: 0 | Status: DISCONTINUED | OUTPATIENT
Start: 2023-09-30 | End: 2023-10-01

## 2023-09-30 RX ORDER — SODIUM CHLORIDE 9 MG/ML
1000 INJECTION, SOLUTION INTRAVENOUS
Refills: 0 | Status: DISCONTINUED | OUTPATIENT
Start: 2023-09-30 | End: 2023-10-01

## 2023-09-30 RX ADMIN — Medication 650 MILLIGRAM(S): at 15:17

## 2023-09-30 RX ADMIN — Medication 100 MILLIGRAM(S): at 00:36

## 2023-09-30 RX ADMIN — Medication 2000 MILLIGRAM(S): at 05:22

## 2023-09-30 RX ADMIN — Medication 2000 MILLIGRAM(S): at 21:18

## 2023-09-30 RX ADMIN — Medication 650 MILLIGRAM(S): at 21:24

## 2023-09-30 RX ADMIN — CARVEDILOL PHOSPHATE 6.25 MILLIGRAM(S): 80 CAPSULE, EXTENDED RELEASE ORAL at 17:19

## 2023-09-30 RX ADMIN — Medication 650 MILLIGRAM(S): at 01:07

## 2023-09-30 RX ADMIN — Medication 650 MILLIGRAM(S): at 21:19

## 2023-09-30 RX ADMIN — CARVEDILOL PHOSPHATE 6.25 MILLIGRAM(S): 80 CAPSULE, EXTENDED RELEASE ORAL at 05:22

## 2023-09-30 RX ADMIN — Medication 650 MILLIGRAM(S): at 00:37

## 2023-09-30 RX ADMIN — Medication 650 MILLIGRAM(S): at 16:20

## 2023-09-30 RX ADMIN — SODIUM CHLORIDE 82 MILLILITER(S): 9 INJECTION, SOLUTION INTRAVENOUS at 21:20

## 2023-09-30 RX ADMIN — ATORVASTATIN CALCIUM 40 MILLIGRAM(S): 80 TABLET, FILM COATED ORAL at 21:19

## 2023-09-30 RX ADMIN — LEVETIRACETAM 1000 MILLIGRAM(S): 250 TABLET, FILM COATED ORAL at 17:19

## 2023-09-30 RX ADMIN — TAMSULOSIN HYDROCHLORIDE 0.4 MILLIGRAM(S): 0.4 CAPSULE ORAL at 21:19

## 2023-09-30 RX ADMIN — SODIUM CHLORIDE 82 MILLILITER(S): 9 INJECTION, SOLUTION INTRAVENOUS at 12:10

## 2023-09-30 RX ADMIN — Medication 650 MILLIGRAM(S): at 15:22

## 2023-09-30 RX ADMIN — AMLODIPINE BESYLATE 10 MILLIGRAM(S): 2.5 TABLET ORAL at 05:22

## 2023-09-30 RX ADMIN — Medication 650 MILLIGRAM(S): at 10:16

## 2023-09-30 RX ADMIN — LEVETIRACETAM 1000 MILLIGRAM(S): 250 TABLET, FILM COATED ORAL at 05:22

## 2023-09-30 RX ADMIN — Medication 2000 MILLIGRAM(S): at 15:17

## 2023-09-30 RX ADMIN — Medication 650 MILLIGRAM(S): at 21:54

## 2023-09-30 NOTE — PROGRESS NOTE ADULT - SUBJECTIVE AND OBJECTIVE BOX
HPI/OVERNIGHT EVENTS:  NAEON. L arm conley unchanged. Denies any fever/chills, CP , palpitations and/or SOB.     MEDICATIONS  (STANDING):  amLODIPine   Tablet 10 milliGRAM(s) Oral daily  atorvastatin 40 milliGRAM(s) Oral at bedtime  carvedilol 6.25 milliGRAM(s) Oral every 12 hours  ceFAZolin  Injectable. 2000 milliGRAM(s) IV Push every 8 hours  levETIRAcetam 1000 milliGRAM(s) Oral two times a day  sodium bicarbonate  Infusion 0.081 mEq/kG/Hr (83 mL/Hr) IV Continuous <Continuous>  tamsulosin 0.4 milliGRAM(s) Oral at bedtime    MEDICATIONS  (PRN):  acetaminophen     Tablet .. 650 milliGRAM(s) Oral every 6 hours PRN Temp greater or equal to 38C (100.4F), Mild Pain (1 - 3)  aluminum hydroxide/magnesium hydroxide/simethicone Suspension 30 milliLiter(s) Oral every 4 hours PRN Dyspepsia  guaiFENesin Oral Liquid (Sugar-Free) 100 milliGRAM(s) Oral every 6 hours PRN Cough  melatonin 3 milliGRAM(s) Oral at bedtime PRN Insomnia  ondansetron Injectable 4 milliGRAM(s) IV Push every 8 hours PRN Nausea and/or Vomiting      Vital Signs Last 24 Hrs  T(C): 36.6 (30 Sep 2023 04:09), Max: 36.9 (29 Sep 2023 16:24)  T(F): 97.9 (30 Sep 2023 04:09), Max: 98.5 (29 Sep 2023 16:24)  HR: 57 (30 Sep 2023 04:09) (57 - 70)  BP: 119/58 (30 Sep 2023 04:09) (119/58 - 147/66)  BP(mean): --  RR: 18 (30 Sep 2023 04:09) (18 - 18)  SpO2: 97% (30 Sep 2023 04:09) (97% - 98%)    Parameters below as of 30 Sep 2023 04:09  Patient On (Oxygen Delivery Method): room air        Constitutional: patient resting comfortably in bed, in no acute distress  Respiratory: respirations are unlabored, no accessory muscle use, no conversational dyspnea  Neurological: A&O x 3  Musculoskeletal: LUE with non-localized pain, minimal erythema overlying AVG, small area of fluctuance superior   Vascular: palpable radial pulse (L) , no thrill over AVG       I&O's Detail      LABS:                        12.2   18.17 )-----------( 177      ( 29 Sep 2023 12:15 )             37.0     09-29    133<L>  |  101  |  43.3<H>  ----------------------------<  109<H>  4.0   |  19.0<L>  |  1.72<H>    Ca    8.6      29 Sep 2023 12:15        Urinalysis Basic - ( 29 Sep 2023 12:15 )    Color: x / Appearance: x / SG: x / pH: x  Gluc: 109 mg/dL / Ketone: x  / Bili: x / Urobili: x   Blood: x / Protein: x / Nitrite: x   Leuk Esterase: x / RBC: x / WBC x   Sq Epi: x / Non Sq Epi: x / Bacteria: x

## 2023-09-30 NOTE — PROGRESS NOTE ADULT - SUBJECTIVE AND OBJECTIVE BOX
NEPHROLOGY INTERVAL HPI/OVERNIGHT EVENTS:    Examined  No distress  feels ok  denies HA CP no SOB    MEDICATIONS  (STANDING):  amLODIPine   Tablet 10 milliGRAM(s) Oral daily  atorvastatin 40 milliGRAM(s) Oral at bedtime  carvedilol 6.25 milliGRAM(s) Oral every 12 hours  ceFAZolin  Injectable. 2000 milliGRAM(s) IV Push every 8 hours  levETIRAcetam 1000 milliGRAM(s) Oral two times a day  sodium bicarbonate  Infusion 0.081 mEq/kG/Hr (83 mL/Hr) IV Continuous <Continuous>  tamsulosin 0.4 milliGRAM(s) Oral at bedtime    MEDICATIONS  (PRN):  acetaminophen     Tablet .. 650 milliGRAM(s) Oral every 6 hours PRN Temp greater or equal to 38C (100.4F), Mild Pain (1 - 3)  aluminum hydroxide/magnesium hydroxide/simethicone Suspension 30 milliLiter(s) Oral every 4 hours PRN Dyspepsia  guaiFENesin Oral Liquid (Sugar-Free) 100 milliGRAM(s) Oral every 6 hours PRN Cough  melatonin 3 milliGRAM(s) Oral at bedtime PRN Insomnia  ondansetron Injectable 4 milliGRAM(s) IV Push every 8 hours PRN Nausea and/or Vomiting      Allergies    penicillin (Other)    Intolerances        Vital Signs Last 24 Hrs  T(C): 36.6 (30 Sep 2023 04:09), Max: 36.9 (29 Sep 2023 16:24)  T(F): 97.9 (30 Sep 2023 04:09), Max: 98.5 (29 Sep 2023 16:24)  HR: 57 (30 Sep 2023 04:09) (57 - 70)  BP: 119/58 (30 Sep 2023 04:09) (119/58 - 147/66)  BP(mean): --  RR: 18 (30 Sep 2023 04:09) (18 - 18)  SpO2: 97% (30 Sep 2023 04:09) (97% - 98%)    Parameters below as of 30 Sep 2023 04:09  Patient On (Oxygen Delivery Method): room air    PHYSICAL EXAM:  GENERAL: NAD  EYES: EOMI  NECK: Supple  NERVOUS SYSTEM:  A/O x3  CHEST:  No rales   HEART:  RRR, No murmur  ABDOMEN: Soft, NT/ND BS+  EXTREMITIES:  No Edema    LABS:                        12.0   20.03 )-----------( 182      ( 30 Sep 2023 06:12 )             37.3     09-30    129<L>  |  99  |  40.4<H>  ----------------------------<  103<H>  3.8   |  17.0<L>  |  1.97<H>    Ca    8.3<L>      30 Sep 2023 06:12        Urinalysis Basic - ( 30 Sep 2023 06:12 )    Color: x / Appearance: x / SG: x / pH: x  Gluc: 103 mg/dL / Ketone: x  / Bili: x / Urobili: x   Blood: x / Protein: x / Nitrite: x   Leuk Esterase: x / RBC: x / WBC x   Sq Epi: x / Non Sq Epi: x / Bacteria: x              RADIOLOGY & ADDITIONAL TESTS:

## 2023-09-30 NOTE — PROGRESS NOTE ADULT - ASSESSMENT
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 Patient presented to [facility] for 2 weeks of inability to tolerate oral intake. Denies any fever or chills. Has chronic diarrhea due to bowel resection. In the ER patient was afebrile. Found to have leukocytosis to 23k. Patient was found to have SANTOS. Had a febrile episode to 102F on 9/25. Started on Vancomycin and Meropenem. Blood cultures 1 of 4 bottles with Staphylococcus epidermidis      Prosthetic MV endocarditis   Staphylococcus epidermidis bacteremia  thrombosed Graft   Fever  Leukocytosis   SANTOS   PCN allergy       - Blood cultures 9/23, 9/25 reporting Staphylococcus epidermidis  - Repeat blood cultures  9/27 no growth   - RVP/COVID 19 PCR 9/25 negative   - CT A/P 9/23 reporting no acute findings   - CXR 9/23 no PNA  - TTE reporting MV veg  - MUSTAPHA reporting large mobile density MV  - UA 9/25 negative for UTI   - Procalcitonin level not reliable in SANTOS   - PCN allergy but has tolerated Zosyn and Zinacef in the past.   - Vascular surgery following for thrombosed graft. plan for OR Sunday  - Continue Cefazolin   - Due to renal failure will hold off on Gentamicin  - Due to Eliquis unable to use Rifampin   - Follow up cultures  - Trend Fever  - Trend WBC      Will Follow    Discussed treatment plan with: Dr Mullins  and CT Surgery

## 2023-09-30 NOTE — PROGRESS NOTE ADULT - ASSESSMENT
Improving  SANTOS on CKD stage III serum cr overall improving   - Baseline 1.6 ---> h/o temporary HD ending in Nov 2021 --> was on HD x 1 year   No hydronephrosis on CT 9/23   Vanco level was 28 on 9/28-  likely contributing to rise in serum Cr     Sepsis with Staph epidermidis bacteremia and prosthetic MV endocarditis  Thrombosed L UE AVG likely source  Going to OR w Vascular tomorrow  ID eval appreciated, PCN allery--> on Cefazolin    MA - likely 2/2 CKD cont IVF w added bicarb  Mild hyponatremia persisting, IVF made somewhat hypertonic  Will check urin studies    Renally dose meds  Avoid nephrotoxic agents  AM labs will follow

## 2023-09-30 NOTE — PROGRESS NOTE ADULT - ASSESSMENT
62yo Male with extensive medical hx including CKD s/p LUE AV loop graft, no HD required since 10/2022(renal recovery)  , admitted for sepsis with strep epi bacteremia and endocarditis with concern that patient's AVG is source of infection. Months of L arm pain. Graft is thrombosed.   Plan:   - Cont to hold eliquis  - CARDS evaluated, high risk RCRI IV. Cleared for OR   - Plan for graft explantation on tomorrow Sunday at 10/2   - Please pre-op on today  , NPO @[place]   - Continue IV abx per ID  - Pain control   -Remainder of care per primary

## 2023-09-30 NOTE — PROGRESS NOTE ADULT - SUBJECTIVE AND OBJECTIVE BOX
[facility]  INFECTIOUS DISEASES at [place] / [place] / [place]  =======================================================                               Roosevelt Victor MD#   Fabián Quigley MD*                             Valentine Handley MD*   Reba Catalan MD*                              Professor Emeritus:  Dr Johnny Hathaway MD^            Diplomates American Board of Internal Medicine & Infectious Diseases                # [place] Office - Appt - Tel  [phone] Fax [phone]                * [place] Office - Appt - Tel [phone] Fax [phone]                      ^[place] Office - Tel  [phone] Fax [phone]                                  Hospital Consult line:  [phone]  =======================================================    JONATHAN GIBSON [id number]    Follow up: MV Endocarditis     no fevers       Allergies:  penicillin (Other)       REVIEW OF SYSTEMS:  CONSTITUTIONAL:  No Fever or chills  HEENT:  No diplopia or blurred vision.  No earache, sore throat or runny nose.  CARDIOVASCULAR:  No chest pain  RESPIRATORY:  No cough, shortness of breath  GASTROINTESTINAL:  No nausea, vomiting or diarrhea.  GENITOURINARY:  No dysuria, frequency or urgency. No Blood in urine  MUSCULOSKELETAL:  no joint aches, no muscle pain  SKIN:  No change in skin, hair or nails.  NEUROLOGIC:  No Headaches      Physical Exam:  GEN: NAD  HEENT: normocephalic and atraumatic. EOMI. PERRL.    NECK: Supple.   LUNGS: CTA B/L.  HEART: RRR  ABDOMEN: Soft, NT, ND.  +BS.    : No CVA tenderness  EXTREMITIES: Without  edema.  MSK: No joint swelling  NEUROLOGIC: No Focal Deficits       Vitals:  T(F): 97.9 (30 Sep 2023 04:09), Max: 98.5 (29 Sep 2023 16:24)  HR: 57 (30 Sep 2023 04:09)  BP: 119/58 (30 Sep 2023 04:09)  RR: 18 (30 Sep 2023 04:09)  SpO2: 97% (30 Sep 2023 04:09) (97% - 98%)  temp max in last 48H T(F): , Max: 98.6 (09-28-23 @ 11:10)    Current Antibiotics:  ceFAZolin  Injectable. 2000 milliGRAM(s) IV Push every 8 hours    Other medications:  amLODIPine   Tablet 10 milliGRAM(s) Oral daily  atorvastatin 40 milliGRAM(s) Oral at bedtime  carvedilol 6.25 milliGRAM(s) Oral every 12 hours  levETIRAcetam 1000 milliGRAM(s) Oral two times a day  sodium bicarbonate  Infusion 0.081 mEq/kG/Hr IV Continuous <Continuous>  tamsulosin 0.4 milliGRAM(s) Oral at bedtime                            12.0   20.03 )-----------( 182      ( 30 Sep 2023 06:12 )             37.3     09-30    129<L>  |  99  |  40.4<H>  ----------------------------<  103<H>  3.8   |  17.0<L>  |  1.97<H>    Ca    8.3<L>      30 Sep 2023 06:12      RECENT CULTURES:  09-27 @ 14:47 .Blood Blood-Peripheral     No growth at 48 Hours    09-27 @ 14:42 .Blood Blood-Peripheral     No growth at 48 Hours    09-25 @ 06:50 .Stool Feces     No enteric pathogens isolated.  (Stool culture examined for Salmonella,  Shigella, Campylobacter, Aeromonas, Plesiomonas,  Vibrio, E.coli O157 and Yersinia)    09-25 @ 06:00    [facility]    09-25 @ 05:50 .Blood Blood-Peripheral Staphylococcus epidermidis    Growth in aerobic and anaerobic bottles: Staphylococcus epidermidis  Growth in aerobic bottle: Gram Positive Cocci in Clusters  Growth in anaerobic bottle: Gram Positive Cocci in Clusters    09-25 @ 05:13 .Blood Blood-Peripheral     Growth in aerobic bottle: Staphylococcus epidermidis  "Susceptibilities not performed"  Growth in aerobic bottle: Gram Positive Cocci in Clusters    09-25 @ 01:00 Clean Catch Clean Catch (Midstream)     <10,000 CFU/mL Normal Urogenital Sivan    09-23 @ 19:30 .Blood Blood-Peripheral     Growth in aerobic and anaerobic bottles: Staphylococcus epidermidis  "Susceptibilities not performed"  Growth in aerobic bottle: Gram Positive Cocci in Clusters  Growth in anaerobic bottle: Gram Positive Cocci in Clusters      09-23 @ 19:25 .Blood Blood-Peripheral Blood Culture PCR  Staphylococcus epidermidis    Growth in aerobic and anaerobic bottles: Staphylococcus epidermidis  Direct identification is available within approximately 3-5  hours either by Blood Panel Multiplexed PCR or Direct  MALDI-TOF. Details: [email or web address]  Growth in aerobic bottle: Gram Positive Cocci in Clusters  Growth in anaerobic bottle: Gram Positive Cocci in Clusters    WBC Count: 20.03 K/uL (09-30-23 @ 06:12)  WBC Count: 18.17 K/uL (09-29-23 @ 12:15)  WBC Count: 16.33 K/uL (09-28-23 @ 05:40)  WBC Count: 16.72 K/uL (09-27-23 @ 05:08)  WBC Count: 18.81 K/uL (09-26-23 @ 06:26)    Creatinine: 1.97 mg/dL (09-30-23 @ 06:12)  Creatinine: 1.72 mg/dL (09-29-23 @ 12:15)  Creatinine: 2.24 mg/dL (09-27-23 @ 05:08)  Creatinine: 2.27 mg/dL (09-26-23 @ 10:50)  Creatinine: 1.79 mg/dL (09-26-23 @ 06:26)    Procalcitonin, Serum: 1.75 ng/mL (09-25-23 @ 05:50)     SARS-CoV-2: NotDetec (09-25-23 @ 06:00)    Vancomycin Level, Trough: 28.2 ug/mL (09-28-23 @ 05:40)      < from: MUSTAPHA Echo Doppler (09.27.23 @ 10:59) >  Summary:   1. Left ventricular ejection fraction, by visual estimation, is 55 to 60%.   2. Severely enlarged left atrium.   3. Mild mitral valve regurgitation.   4. Mitral prosthesis vegetation.   5. There is a bioprosthetic valve in the mitral position with thickened   leaflets yet leaflet mobility is fairly preserved. There is trace to mild   valvular regurgitation with no perivalvular leaks. No rocking motion or   dehiscence noted. There is a large and mobile vegetation measuring   1.8x1.0 cm attached to the atrial surface of the medial bioprosthetic   leaflet. There is evidence of at least mild valve stenosis with a mean   transvalvular gradient of 5 mmHg at a heart rate of 60 bpm. The mitral   valve area by 3D is 1.57 cm2.   6. Mild tricuspid regurgitation.   7. Bioprosthesis in the aortic position.   8. Dilatation of the aortic root.    < end of copied text >      < from: TTE Echo Complete w/o Contrast w/ Doppler (09.26.23 @ 14:32) >  Summary:   1. Left ventricular ejection fraction, by visual estimation, is 50 to   55%.   2. Normal global left ventricular systolic function.   3. Diastolic function indeterminate.   4. Abnormal septal motion consistent with post-operative status.   5. Normal RV size with mildly reduced RV systolic function, inadequate   estimation of RVSP.   6. Bioprosthetic mitral valve present with a large mobile lesion (at   least 2.0 cm x 1.5 cm) prolapsing into and out of the LV-LA cavity,   hightly suggestive of vegetation. Mean transmitral valve gradient 4.8   mmHg (at HR of 60 bpm).   7. Bioprosthesis in the aortic position, mean gradient of 9 mmHg.   8. There is no evidence of pericardial effusion.   9. Compared to the prior TTE study from 5/23/23, lesion over bioMVR is   again noted and is now much larger and recommend MUSTAPHA study to confirm   vegetation and exclude other valvular involvement.    < end of copied text >

## 2023-09-30 NOTE — PROGRESS NOTE ADULT - SUBJECTIVE AND OBJECTIVE BOX
Chief Complaint:      SUBJECTIVE / OVERNIGHT EVENTS:    Patient denies chest pain, SOB, abd pain, N/V, fever, chills, dysuria or any other complaints. All remainder ROS negative.       I&O's Summary        PHYSICAL EXAM:  Vital Signs Last 24 Hrs  T(C): 36.4 (30 Sep 2023 11:11), Max: 36.9 (29 Sep 2023 16:24)  T(F): 97.5 (30 Sep 2023 11:11), Max: 98.5 (29 Sep 2023 16:24)  HR: 59 (30 Sep 2023 11:11) (57 - 63)  BP: 114/57 (30 Sep 2023 11:11) (114/57 - 124/75)  BP(mean): --  RR: 18 (30 Sep 2023 11:11) (18 - 18)  SpO2: 96% (30 Sep 2023 11:11) (96% - 98%)    Parameters below as of 30 Sep 2023 11:11  Patient On (Oxygen Delivery Method): room air          CONSTITUTIONAL: pt examined bedside, laying comfortably in bed in NAD  HEENT: NC/AT, moist oral mucosa, clear conjunctiva, sclera nonicteric, EOMI  RESPIRATORY: Normal respiratory effort; CTA b/l, no wheezing, rhonchi, rales  CARDIOVASCULAR: RRR, normal S1 and S2, no murmur/rub/gallop  ABDOMEN: soft, NT/ND, normoactive bowel sounds, no rebound/guarding, no HSM  MUSCLOSKELETAL:  no joint swelling or tenderness to palpation  EXTREMITIES: No cynaosis, no clubbing, no lower extremity edema; Peripheral pulses are 2+ bilaterally  PSYCH: affect appropriate and cooperative  NEUROLOGY: A+O to person, place, and time, no focal neurologic deficits appreciated   SKIN: No rashes or no palpable lesions        LABS:                        12.0   20.03 )-----------( 182      ( 30 Sep 2023 06:12 )             37.3     09-30    129<L>  |  99  |  40.4<H>  ----------------------------<  103<H>  3.8   |  17.0<L>  |  1.97<H>    Ca    8.3<L>      30 Sep 2023 06:12            Urinalysis Basic - ( 30 Sep 2023 06:12 )    Color: x / Appearance: x / SG: x / pH: x  Gluc: 103 mg/dL / Ketone: x  / Bili: x / Urobili: x   Blood: x / Protein: x / Nitrite: x   Leuk Esterase: x / RBC: x / WBC x   Sq Epi: x / Non Sq Epi: x / Bacteria: x        Culture - Blood (collected 27 Sep 2023 14:47)  Source: .Blood Blood-Peripheral  Preliminary Report (29 Sep 2023 22:01):    No growth at 48 Hours    Culture - Blood (collected 27 Sep 2023 14:42)  Source: .Blood Blood-Peripheral  Preliminary Report (29 Sep 2023 22:01):    No growth at 48 Hours      CAPILLARY BLOOD GLUCOSE            RADIOLOGY & ADDITIONAL TESTS:          MEDICATIONS  (STANDING):  amLODIPine   Tablet 10 milliGRAM(s) Oral daily  atorvastatin 40 milliGRAM(s) Oral at bedtime  carvedilol 6.25 milliGRAM(s) Oral every 12 hours  ceFAZolin  Injectable. 2000 milliGRAM(s) IV Push every 8 hours  dextrose 5% + sodium chloride 0.45% 1000 milliLiter(s) (82 mL/Hr) IV Continuous <Continuous>  levETIRAcetam 1000 milliGRAM(s) Oral two times a day  sodium bicarbonate 650 milliGRAM(s) Oral every 8 hours  sodium bicarbonate  Infusion 0.081 mEq/kG/Hr (83 mL/Hr) IV Continuous <Continuous>  tamsulosin 0.4 milliGRAM(s) Oral at bedtime    MEDICATIONS  (PRN):  acetaminophen     Tablet .. 650 milliGRAM(s) Oral every 6 hours PRN Temp greater or equal to 38C (100.4F), Mild Pain (1 - 3)  aluminum hydroxide/magnesium hydroxide/simethicone Suspension 30 milliLiter(s) Oral every 4 hours PRN Dyspepsia  guaiFENesin Oral Liquid (Sugar-Free) 100 milliGRAM(s) Oral every 6 hours PRN Cough  melatonin 3 milliGRAM(s) Oral at bedtime PRN Insomnia  ondansetron Injectable 4 milliGRAM(s) IV Push every 8 hours PRN Nausea and/or Vomiting                                     Chief Complaint:  SANTOS    SUBJECTIVE / OVERNIGHT EVENTS: No acute events reported overnight.  pt offers no acute complaints at this time.       PHYSICAL EXAM:  Vital Signs Last 24 Hrs  T(C): 36.4 (30 Sep 2023 11:11), Max: 36.9 (29 Sep 2023 16:24)  T(F): 97.5 (30 Sep 2023 11:11), Max: 98.5 (29 Sep 2023 16:24)  HR: 59 (30 Sep 2023 11:11) (57 - 63)  BP: 114/57 (30 Sep 2023 11:11) (114/57 - 124/75)  BP(mean): --  RR: 18 (30 Sep 2023 11:11) (18 - 18)  SpO2: 96% (30 Sep 2023 11:11) (96% - 98%)    Parameters below as of 30 Sep 2023 11:11  Patient On (Oxygen Delivery Method): room air        GENERAL: pt examined bedside, laying comfortably in bed in NAD  HEENT: NC/AT, moist oral mucosa, clear conjunctiva, sclera nonicteric  RESPIRATORY: CTA b/l, no wheezing, rhonchi, rales  CARDIOVASCULAR: RRR, normal S1 and S2  ABDOMEN: soft, NT/ND, +BS, no rebound/guarding  MUSCLOSKELETAL:  no joint swelling or tenderness to palpation  EXTREMITIES: No cynaosis, no clubbing, no lower extremity edema  NEUROLOGY: A+O x3, no focal neurologic deficits appreciated   SKIN: No rashes or no palpable lesions        LABS:                        12.0   20.03 )-----------( 182      ( 30 Sep 2023 06:12 )             37.3     09-30    129<L>  |  99  |  40.4<H>  ----------------------------<  103<H>  3.8   |  17.0<L>  |  1.97<H>    Ca    8.3<L>      30 Sep 2023 06:12        Urinalysis Basic - ( 30 Sep 2023 06:12 )    Color: x / Appearance: x / SG: x / pH: x  Gluc: 103 mg/dL / Ketone: x  / Bili: x / Urobili: x   Blood: x / Protein: x / Nitrite: x   Leuk Esterase: x / RBC: x / WBC x   Sq Epi: x / Non Sq Epi: x / Bacteria: x        Culture - Blood (collected 27 Sep 2023 14:47)  Source: .Blood Blood-Peripheral  Preliminary Report (29 Sep 2023 22:01):    No growth at 48 Hours    Culture - Blood (collected 27 Sep 2023 14:42)  Source: .Blood Blood-Peripheral  Preliminary Report (29 Sep 2023 22:01):    No growth at 48 Hours      CAPILLARY BLOOD GLUCOSE            RADIOLOGY & ADDITIONAL TESTS:    < from: CT Head No Cont (09.28.23 @ 11:12) >  IMPRESSION:  No acute intracranial hemorrhage.    < end of copied text >      < from: CT Maxillofacial No Cont (09.28.23 @ 11:12) >  IMPRESSION: Odontogenic disease.    < end of copied text >        < from: MUSTAPHA Echo Doppler (09.27.23 @ 10:59) >  Summary:   1. Left ventricular ejection fraction, by visual estimation, is 55 to   60%.   2. Severely enlarged left atrium.   3. Mild mitral valve regurgitation.   4. Mitral prosthesis vegetation.   5. There is a bioprosthetic valve in the mitral position with thickened   leaflets yet leaflet mobility is fairly preserved. There is trace to mild   valvular regurgitation with no perivalvular leaks. No rocking motion or   dehiscence noted. There is a large and mobile vegetation measuring   1.8x1.0 cm attached to the atrial surface of the medial bioprosthetic   leaflet. There is evidence of at least mild valve stenosis with a mean   transvalvular gradient of 5 mmHg at a heart rate of 60 bpm. The mitral   valve area by 3D is 1.57 cm2.   6. Mild tricuspid regurgitation.   7. Bioprosthesis inthe aortic position.   8. Dilatation of the aortic root.    < end of copied text >      MEDICATIONS  (STANDING):  amLODIPine   Tablet 10 milliGRAM(s) Oral daily  atorvastatin 40 milliGRAM(s) Oral at bedtime  carvedilol 6.25 milliGRAM(s) Oral every 12 hours  ceFAZolin  Injectable. 2000 milliGRAM(s) IV Push every 8 hours  dextrose 5% + sodium chloride 0.45% 1000 milliLiter(s) (82 mL/Hr) IV Continuous <Continuous>  levETIRAcetam 1000 milliGRAM(s) Oral two times a day  sodium bicarbonate 650 milliGRAM(s) Oral every 8 hours  sodium bicarbonate  Infusion 0.081 mEq/kG/Hr (83 mL/Hr) IV Continuous <Continuous>  tamsulosin 0.4 milliGRAM(s) Oral at bedtime    MEDICATIONS  (PRN):  acetaminophen     Tablet .. 650 milliGRAM(s) Oral every 6 hours PRN Temp greater or equal to 38C (100.4F), Mild Pain (1 - 3)  aluminum hydroxide/magnesium hydroxide/simethicone Suspension 30 milliLiter(s) Oral every 4 hours PRN Dyspepsia  guaiFENesin Oral Liquid (Sugar-Free) 100 milliGRAM(s) Oral every 6 hours PRN Cough  melatonin 3 milliGRAM(s) Oral at bedtime PRN Insomnia  ondansetron Injectable 4 milliGRAM(s) IV Push every 8 hours PRN Nausea and/or Vomiting                                     Chief Complaint:  SANTOS    SUBJECTIVE / OVERNIGHT EVENTS: No acute events reported overnight.  pt offers no acute complaints at this time.       PHYSICAL EXAM:  Vital Signs Last 24 Hrs  T(C): 36.4 (30 Sep 2023 11:11), Max: 36.9 (29 Sep 2023 16:24)  T(F): 97.5 (30 Sep 2023 11:11), Max: 98.5 (29 Sep 2023 16:24)  HR: 59 (30 Sep 2023 11:11) (57 - 63)  BP: 114/57 (30 Sep 2023 11:11) (114/57 - 124/75)  BP(mean): --  RR: 18 (30 Sep 2023 11:11) (18 - 18)  SpO2: 96% (30 Sep 2023 11:11) (96% - 98%)    Parameters below as of 30 Sep 2023 11:11  Patient On (Oxygen Delivery Method): room air        GENERAL: pt examined bedside, laying comfortably in bed in NAD  HEENT: NC/AT, dry oral mucosa, clear conjunctiva, sclera nonicteric  RESPIRATORY: CTA b/l, no wheezing, rhonchi, rales  CARDIOVASCULAR: RRR, normal S1 and S2  ABDOMEN: soft, NT/ND, +BS, no rebound/guarding  MUSCLOSKELETAL:  no joint swelling or tenderness to palpation  EXTREMITIES: No cynaosis, no clubbing, no lower extremity edema  NEUROLOGY: A+O x3, no focal neurologic deficits appreciated   SKIN: No rashes or no palpable lesions, poor turgur        LABS:                        12.0   20.03 )-----------( 182      ( 30 Sep 2023 06:12 )             37.3     09-30    129<L>  |  99  |  40.4<H>  ----------------------------<  103<H>  3.8   |  17.0<L>  |  1.97<H>    Ca    8.3<L>      30 Sep 2023 06:12        Urinalysis Basic - ( 30 Sep 2023 06:12 )    Color: x / Appearance: x / SG: x / pH: x  Gluc: 103 mg/dL / Ketone: x  / Bili: x / Urobili: x   Blood: x / Protein: x / Nitrite: x   Leuk Esterase: x / RBC: x / WBC x   Sq Epi: x / Non Sq Epi: x / Bacteria: x        Culture - Blood (collected 27 Sep 2023 14:47)  Source: .Blood Blood-Peripheral  Preliminary Report (29 Sep 2023 22:01):    No growth at 48 Hours    Culture - Blood (collected 27 Sep 2023 14:42)  Source: .Blood Blood-Peripheral  Preliminary Report (29 Sep 2023 22:01):    No growth at 48 Hours      CAPILLARY BLOOD GLUCOSE            RADIOLOGY & ADDITIONAL TESTS:    < from: CT Head No Cont (09.28.23 @ 11:12) >  IMPRESSION:  No acute intracranial hemorrhage.    < end of copied text >      < from: CT Maxillofacial No Cont (09.28.23 @ 11:12) >  IMPRESSION: Odontogenic disease.    < end of copied text >        < from: MUSTAPHA Echo Doppler (09.27.23 @ 10:59) >  Summary:   1. Left ventricular ejection fraction, by visual estimation, is 55 to   60%.   2. Severely enlarged left atrium.   3. Mild mitral valve regurgitation.   4. Mitral prosthesis vegetation.   5. There is a bioprosthetic valve in the mitral position with thickened   leaflets yet leaflet mobility is fairly preserved. There is trace to mild   valvular regurgitation with no perivalvular leaks. No rocking motion or   dehiscence noted. There is a large and mobile vegetation measuring   1.8x1.0 cm attached to the atrial surface of the medial bioprosthetic   leaflet. There is evidence of at least mild valve stenosis with a mean   transvalvular gradient of 5 mmHg at a heart rate of 60 bpm. The mitral   valve area by 3D is 1.57 cm2.   6. Mild tricuspid regurgitation.   7. Bioprosthesis inthe aortic position.   8. Dilatation of the aortic root.    < end of copied text >      MEDICATIONS  (STANDING):  amLODIPine   Tablet 10 milliGRAM(s) Oral daily  atorvastatin 40 milliGRAM(s) Oral at bedtime  carvedilol 6.25 milliGRAM(s) Oral every 12 hours  ceFAZolin  Injectable. 2000 milliGRAM(s) IV Push every 8 hours  dextrose 5% + sodium chloride 0.45% 1000 milliLiter(s) (82 mL/Hr) IV Continuous <Continuous>  levETIRAcetam 1000 milliGRAM(s) Oral two times a day  sodium bicarbonate 650 milliGRAM(s) Oral every 8 hours  sodium bicarbonate  Infusion 0.081 mEq/kG/Hr (83 mL/Hr) IV Continuous <Continuous>  tamsulosin 0.4 milliGRAM(s) Oral at bedtime    MEDICATIONS  (PRN):  acetaminophen     Tablet .. 650 milliGRAM(s) Oral every 6 hours PRN Temp greater or equal to 38C (100.4F), Mild Pain (1 - 3)  aluminum hydroxide/magnesium hydroxide/simethicone Suspension 30 milliLiter(s) Oral every 4 hours PRN Dyspepsia  guaiFENesin Oral Liquid (Sugar-Free) 100 milliGRAM(s) Oral every 6 hours PRN Cough  melatonin 3 milliGRAM(s) Oral at bedtime PRN Insomnia  ondansetron Injectable 4 milliGRAM(s) IV Push every 8 hours PRN Nausea and/or Vomiting

## 2023-09-30 NOTE — PROGRESS NOTE ADULT - ASSESSMENT
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  Labs consistent w/ SANTOS on CKD.  Hospital course complicated by bacteremia w/ cxs growing staph epidermidus and TTE showing large MV vegitation.       likely due to in the setting of GI losses/ Poor PO intake, however patient with known HF (last EF 55-60% on MUSTAPHA), given 1L LR in ED, will continue with gentle fluid resuscitation with NS 50 cc/hr and monitor fluid balance/ Renal function. nephrology / cardiology consulted. Blood cultures growing staph epiderdimitis, ECHO showed large mitral valve vegetation, TTE 9/27 -       Sepsis POA 2/2 S epididermiditis bacteremia likely endovascular in origin w/ subsequent MV IE   - Reepat BCXs   f/u repeat blood cx  Pt hs Aortic graft and dual valve replaced   s/p MUSTAPHA -  ID and cardio following, cardiothoracic surgery following - await recs   CT A/P shows no acute pathology - Redemonstration on aortic dissection - findings discussed with Radiologist - Dr Lacy- appears stable as compared to prior imaging in 5/2023 .   -Repeat Blood cultures   - elevated vanc trough- likely incorrect as it was drawn during infusion  - as per ID Start Cefazolin, vanc dc  - as per CT surgery- **will hold on contrast studies at this time due to , pt likely high risk of progressing to HD again  if cultures remain negative, may consider treating pt with 4 weeks of IV antibiotics and repeat MUSTAPHA at that time to see if vegetation improving as pt is high risk for surgery   - Patient is scheduled for the fistula revision this upcoming cuca 10/01- as per cardio cleared      #Acute Renal Failure, associated metabolic acidosis   Likely Pre renal, in setting of GI losses / Poor PO intake   - Cr 3.5, baseline from last DC appears to be ~ 1.5-1.8, however patient did need short term HD in the recent past   - Nephrology following     #Intractable Nausea / Vomiting   - resolved at this time      #Heart Failure with Preserved Ejection Fraction   Follows Dr. Gandotra   - Continues on Coreg, will hold torsemide in light of likely dehydration / hyponatremia, resume when clinically appropriate   - MUSTAPHA - EF 55-60%      #Elevated Trp   Likely in setting of renal failure       #Hyponatremia   - repeat labs pending      #paroxysmal Atrial fibrillation   - Rate controlled currently, continue BB, monitor on Tele   - Stopped briefly AC due to SDH in recent past, however CT head 7/2023 shows complete resolution of SDH, resumed AC outpatient per Cardiology, planned for Watchman procedure this coming Oct  - AC on hold currently    #history of SDH   As above, CT head showed complete resolution of recent SDH, continues on AC     #Mitral/Aortic valve replacement   -Recent MUSTAPHA shows bioprosthetic valves with only moderate paravalvular leak in MV.     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  Labs consistent w/ SANTOS on CKD.  Hospital course complicated by bacteremia w/ cxs growing staph epidermidus and TTE showing large MV vegetation        Sepsis POA 2/2 S epididermiditis bacteremia likely endovascular in origin w/ subsequent MV IE   - Reepat BCXs from 09/27 NGTD  - LUE AV graft concerning for infection source of bacteremia leading to IE  - Plan for graft explantation w/ vascular on 10/01  - Has MV/AV replacements which increases Pts risk of IE in the setting of staph epi bacteremia    - s/p MUSTAPHA w/ evidence of large MV vegitation   - Leukocytosis trending up but remains afebrile   - RVP pcr (-) 09/25, UA w/out pyruia and CXR w/ no evidence of infiltrate/consolidation  - c/w cefazolin as per ID recs   - CT A/P redemonstrates aortic dissection stable from 05/2023 but no acute findings reported   - CTSx consulted for consideration of re-operation of MVR   - As per CTSx will hold off on surgery for now and on contrast studies given pt high surgical risk and risk of progressing to HD again   - If cxs remain negative can tx w/ 4wks of abxs repeat MUSTAPHA to see if vegitation improving as per CTSx  - Planned for AV graft revision 10/01 as per vascular   - ID following and recs noted   - Monitor CBC and temperature       SANTOS on CKD III likely prerenal azotemia 2/2 GI losses and poor po intake  AGMA 2/2 uremia/SANTOS  Hypovolemic hyponatremia  - Cr improving since admission (baseline 1.5-1.8)  - N/V resolved   - Required short term HD in recent past   - Serum HCO3 trending down; started on short course of bicarb gtt per nephro necs   - Anticipate Na and bicarb will improve s/p bicarb gtt  - c/w sodium HCO3 po w/ caution in light of HF  - Continue to hold of on diuretic given hypovolemia   - Avoid nephrotoxic meds or renally dose if needed   - Nephrology following and recs noted       Chronic HFpEF  - Clinically hypovolemic at this time   - Follows Dr. Frankel   - c/w Coreg  - Torsemide held in light of dehydration on admission resume on d/c or sooner if needed  - MUSTAPHA w/ LVEF 55-60%  - Monitor daily weights and I/O's  - Cardio following and recs noted      paroxysmal Atrial fibrillation   - Rate controlled currently   - CHADSVASC of 5 and as per cardiology no need to bridge prior to endovascular procedure tomorrow   - c/w BB for rate control  - Briefly held AC when had SDH but since resumed after 07/2023 due CTH w/ complete resolution of SDH  - AC currently on hold for AV graft exploration tomorrow; resume once cleared by vascular after procedure       HTN  - c/w amlodipine and carvedilol       Troponemia   - Likely from poor clearance in the setting of SANTOS on CKD  - Plateaued w/ no EKG changes   - No chest pain       Intractable Nausea / Vomiting   - Resolved       SDH   - CTH has shown complete resolution since July 2023      Mitral/Aortic valve replacement   - Recent MUSTAPHA shows bioprosthetic valves with only moderate paravalvular leak in MV  - Now w/ large vegitaiton on MV  - CTSx following but no plan for surg intervention at this time        VTE ppx: SCDs and resume AC once cleared by vascular after AV graft exploration.     Dispo: remains acute

## 2023-10-01 ENCOUNTER — RESULT REVIEW (OUTPATIENT)
Age: 63
End: 2023-10-01

## 2023-10-01 ENCOUNTER — TRANSCRIPTION ENCOUNTER (OUTPATIENT)
Age: 63
End: 2023-10-01

## 2023-10-01 LAB
ALBUMIN SERPL ELPH-MCNC: 2.2 G/DL — LOW (ref 3.3–5.2)
ALP SERPL-CCNC: 174 U/L — HIGH (ref 40–120)
ALT FLD-CCNC: 15 U/L — SIGNIFICANT CHANGE UP
ANION GAP SERPL CALC-SCNC: 15 MMOL/L — SIGNIFICANT CHANGE UP (ref 5–17)
APTT BLD: 30.1 SEC — SIGNIFICANT CHANGE UP (ref 24.5–35.6)
AST SERPL-CCNC: 59 U/L — HIGH
BASOPHILS # BLD AUTO: 0.06 K/UL — SIGNIFICANT CHANGE UP (ref 0–0.2)
BASOPHILS NFR BLD AUTO: 0.3 % — SIGNIFICANT CHANGE UP (ref 0–2)
BILIRUB SERPL-MCNC: 0.4 MG/DL — SIGNIFICANT CHANGE UP (ref 0.4–2)
BLD GP AB SCN SERPL QL: SIGNIFICANT CHANGE UP
BUN SERPL-MCNC: 43.7 MG/DL — HIGH (ref 8–20)
CALCIUM SERPL-MCNC: 8.2 MG/DL — LOW (ref 8.4–10.5)
CHLORIDE SERPL-SCNC: 102 MMOL/L — SIGNIFICANT CHANGE UP (ref 96–108)
CO2 SERPL-SCNC: 18 MMOL/L — LOW (ref 22–29)
CREAT SERPL-MCNC: 1.93 MG/DL — HIGH (ref 0.5–1.3)
EGFR: 39 ML/MIN/1.73M2 — LOW
EOSINOPHIL # BLD AUTO: 0.4 K/UL — SIGNIFICANT CHANGE UP (ref 0–0.5)
EOSINOPHIL NFR BLD AUTO: 1.9 % — SIGNIFICANT CHANGE UP (ref 0–6)
GLUCOSE SERPL-MCNC: 85 MG/DL — SIGNIFICANT CHANGE UP (ref 70–99)
HCT VFR BLD CALC: 35.1 % — LOW (ref 39–50)
HGB BLD-MCNC: 11.1 G/DL — LOW (ref 13–17)
IMM GRANULOCYTES NFR BLD AUTO: 0.6 % — SIGNIFICANT CHANGE UP (ref 0–0.9)
INR BLD: 1.23 RATIO — HIGH (ref 0.85–1.18)
LYMPHOCYTES # BLD AUTO: 0.99 K/UL — LOW (ref 1–3.3)
LYMPHOCYTES # BLD AUTO: 4.8 % — LOW (ref 13–44)
MAGNESIUM SERPL-MCNC: 1.9 MG/DL — SIGNIFICANT CHANGE UP (ref 1.8–2.6)
MCHC RBC-ENTMCNC: 27.4 PG — SIGNIFICANT CHANGE UP (ref 27–34)
MCHC RBC-ENTMCNC: 31.6 GM/DL — LOW (ref 32–36)
MCV RBC AUTO: 86.7 FL — SIGNIFICANT CHANGE UP (ref 80–100)
MONOCYTES # BLD AUTO: 1.29 K/UL — HIGH (ref 0–0.9)
MONOCYTES NFR BLD AUTO: 6.2 % — SIGNIFICANT CHANGE UP (ref 2–14)
NEUTROPHILS # BLD AUTO: 17.98 K/UL — HIGH (ref 1.8–7.4)
NEUTROPHILS NFR BLD AUTO: 86.2 % — HIGH (ref 43–77)
PHOSPHATE SERPL-MCNC: 3.4 MG/DL — SIGNIFICANT CHANGE UP (ref 2.4–4.7)
PLATELET # BLD AUTO: 157 K/UL — SIGNIFICANT CHANGE UP (ref 150–400)
POTASSIUM SERPL-MCNC: 3.8 MMOL/L — SIGNIFICANT CHANGE UP (ref 3.5–5.3)
POTASSIUM SERPL-SCNC: 3.8 MMOL/L — SIGNIFICANT CHANGE UP (ref 3.5–5.3)
PROT SERPL-MCNC: 6.3 G/DL — LOW (ref 6.6–8.7)
PROTHROM AB SERPL-ACNC: 13.6 SEC — HIGH (ref 9.5–13)
RBC # BLD: 4.05 M/UL — LOW (ref 4.2–5.8)
RBC # FLD: 13.2 % — SIGNIFICANT CHANGE UP (ref 10.3–14.5)
SODIUM SERPL-SCNC: 135 MMOL/L — SIGNIFICANT CHANGE UP (ref 135–145)
WBC # BLD: 20.84 K/UL — HIGH (ref 3.8–10.5)
WBC # FLD AUTO: 20.84 K/UL — HIGH (ref 3.8–10.5)

## 2023-10-01 PROCEDURE — 99233 SBSQ HOSP IP/OBS HIGH 50: CPT

## 2023-10-01 PROCEDURE — 35226 REPAIR BLOOD VESSEL DIR LXTR: CPT | Mod: GC,LT,59

## 2023-10-01 PROCEDURE — 35903 EXCISION GRAFT EXTREMITY: CPT | Mod: GC

## 2023-10-01 PROCEDURE — 88300 SURGICAL PATH GROSS: CPT | Mod: 26

## 2023-10-01 PROCEDURE — 99233 SBSQ HOSP IP/OBS HIGH 50: CPT | Mod: FS

## 2023-10-01 DEVICE — PATCH PERICARDIAL PHOTOFIX .8X8CM: Type: IMPLANTABLE DEVICE | Site: LEFT | Status: FUNCTIONAL

## 2023-10-01 DEVICE — ARISTA 3GR: Type: IMPLANTABLE DEVICE | Site: LEFT | Status: FUNCTIONAL

## 2023-10-01 RX ORDER — ONDANSETRON 8 MG/1
4 TABLET, FILM COATED ORAL EVERY 8 HOURS
Refills: 0 | Status: DISCONTINUED | OUTPATIENT
Start: 2023-10-01 | End: 2023-10-16

## 2023-10-01 RX ORDER — OXYCODONE HYDROCHLORIDE 5 MG/1
5 TABLET ORAL EVERY 6 HOURS
Refills: 0 | Status: DISCONTINUED | OUTPATIENT
Start: 2023-10-01 | End: 2023-10-02

## 2023-10-01 RX ORDER — ATORVASTATIN CALCIUM 80 MG/1
40 TABLET, FILM COATED ORAL AT BEDTIME
Refills: 0 | Status: DISCONTINUED | OUTPATIENT
Start: 2023-10-01 | End: 2023-10-07

## 2023-10-01 RX ORDER — SODIUM BICARBONATE 1 MEQ/ML
0.08 SYRINGE (ML) INTRAVENOUS
Qty: 75 | Refills: 0 | Status: DISCONTINUED | OUTPATIENT
Start: 2023-10-01 | End: 2023-10-03

## 2023-10-01 RX ORDER — SODIUM BICARBONATE 1 MEQ/ML
650 SYRINGE (ML) INTRAVENOUS EVERY 8 HOURS
Refills: 0 | Status: DISCONTINUED | OUTPATIENT
Start: 2023-10-01 | End: 2023-10-03

## 2023-10-01 RX ORDER — ACETAMINOPHEN 500 MG
650 TABLET ORAL EVERY 6 HOURS
Refills: 0 | Status: DISCONTINUED | OUTPATIENT
Start: 2023-10-01 | End: 2023-10-16

## 2023-10-01 RX ORDER — SODIUM BICARBONATE 1 MEQ/ML
0.08 SYRINGE (ML) INTRAVENOUS
Qty: 75 | Refills: 0 | Status: DISCONTINUED | OUTPATIENT
Start: 2023-10-01 | End: 2023-10-01

## 2023-10-01 RX ORDER — HYDROMORPHONE HYDROCHLORIDE 2 MG/ML
0.25 INJECTION INTRAMUSCULAR; INTRAVENOUS; SUBCUTANEOUS
Refills: 0 | Status: DISCONTINUED | OUTPATIENT
Start: 2023-10-01 | End: 2023-10-01

## 2023-10-01 RX ORDER — LEVETIRACETAM 250 MG/1
1000 TABLET, FILM COATED ORAL
Refills: 0 | Status: DISCONTINUED | OUTPATIENT
Start: 2023-10-01 | End: 2023-10-05

## 2023-10-01 RX ORDER — TAMSULOSIN HYDROCHLORIDE 0.4 MG/1
0.4 CAPSULE ORAL AT BEDTIME
Refills: 0 | Status: DISCONTINUED | OUTPATIENT
Start: 2023-10-01 | End: 2023-10-16

## 2023-10-01 RX ORDER — CEFAZOLIN SODIUM 1 G
2000 VIAL (EA) INJECTION EVERY 8 HOURS
Refills: 0 | Status: DISCONTINUED | OUTPATIENT
Start: 2023-10-01 | End: 2023-10-16

## 2023-10-01 RX ORDER — CARVEDILOL PHOSPHATE 80 MG/1
6.25 CAPSULE, EXTENDED RELEASE ORAL EVERY 12 HOURS
Refills: 0 | Status: DISCONTINUED | OUTPATIENT
Start: 2023-10-01 | End: 2023-10-01

## 2023-10-01 RX ORDER — LANOLIN ALCOHOL/MO/W.PET/CERES
3 CREAM (GRAM) TOPICAL AT BEDTIME
Refills: 0 | Status: DISCONTINUED | OUTPATIENT
Start: 2023-10-01 | End: 2023-10-16

## 2023-10-01 RX ORDER — CARVEDILOL PHOSPHATE 80 MG/1
6.25 CAPSULE, EXTENDED RELEASE ORAL EVERY 12 HOURS
Refills: 0 | Status: DISCONTINUED | OUTPATIENT
Start: 2023-10-01 | End: 2023-10-06

## 2023-10-01 RX ORDER — SODIUM CHLORIDE 9 MG/ML
1000 INJECTION, SOLUTION INTRAVENOUS
Refills: 0 | Status: DISCONTINUED | OUTPATIENT
Start: 2023-10-01 | End: 2023-10-01

## 2023-10-01 RX ORDER — OXYCODONE HYDROCHLORIDE 5 MG/1
10 TABLET ORAL EVERY 6 HOURS
Refills: 0 | Status: DISCONTINUED | OUTPATIENT
Start: 2023-10-01 | End: 2023-10-02

## 2023-10-01 RX ORDER — AMLODIPINE BESYLATE 2.5 MG/1
10 TABLET ORAL DAILY
Refills: 0 | Status: DISCONTINUED | OUTPATIENT
Start: 2023-10-01 | End: 2023-10-07

## 2023-10-01 RX ORDER — AMLODIPINE BESYLATE 2.5 MG/1
10 TABLET ORAL DAILY
Refills: 0 | Status: DISCONTINUED | OUTPATIENT
Start: 2023-10-01 | End: 2023-10-01

## 2023-10-01 RX ADMIN — HYDROMORPHONE HYDROCHLORIDE 0.25 MILLIGRAM(S): 2 INJECTION INTRAMUSCULAR; INTRAVENOUS; SUBCUTANEOUS at 17:21

## 2023-10-01 RX ADMIN — Medication 2000 MILLIGRAM(S): at 22:51

## 2023-10-01 RX ADMIN — Medication 650 MILLIGRAM(S): at 18:38

## 2023-10-01 RX ADMIN — LEVETIRACETAM 1000 MILLIGRAM(S): 250 TABLET, FILM COATED ORAL at 17:56

## 2023-10-01 RX ADMIN — Medication 2000 MILLIGRAM(S): at 05:03

## 2023-10-01 RX ADMIN — Medication 650 MILLIGRAM(S): at 17:38

## 2023-10-01 RX ADMIN — HYDROMORPHONE HYDROCHLORIDE 0.25 MILLIGRAM(S): 2 INJECTION INTRAMUSCULAR; INTRAVENOUS; SUBCUTANEOUS at 16:44

## 2023-10-01 RX ADMIN — OXYCODONE HYDROCHLORIDE 10 MILLIGRAM(S): 5 TABLET ORAL at 23:51

## 2023-10-01 RX ADMIN — OXYCODONE HYDROCHLORIDE 10 MILLIGRAM(S): 5 TABLET ORAL at 22:51

## 2023-10-01 RX ADMIN — Medication 650 MILLIGRAM(S): at 22:51

## 2023-10-01 RX ADMIN — TAMSULOSIN HYDROCHLORIDE 0.4 MILLIGRAM(S): 0.4 CAPSULE ORAL at 22:51

## 2023-10-01 RX ADMIN — ATORVASTATIN CALCIUM 40 MILLIGRAM(S): 80 TABLET, FILM COATED ORAL at 22:51

## 2023-10-01 RX ADMIN — Medication 83 MEQ/KG/HR: at 16:15

## 2023-10-01 NOTE — PROGRESS NOTE ADULT - ASSESSMENT
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 Patient presented to [facility] for 2 weeks of inability to tolerate oral intake. Denies any fever or chills. Has chronic diarrhea due to bowel resection. In the ER patient was afebrile. Found to have leukocytosis to 23k. Patient was found to have SANTOS. Had a febrile episode to 102F on 9/25. Started on Vancomycin and Meropenem. Blood cultures 1 of 4 bottles with Staphylococcus epidermidis      Prosthetic MV endocarditis   Staphylococcus epidermidis bacteremia  thrombosed Graft   s/p Left AV graft removal 10/1/23  Fever  Leukocytosis   SANTOS   PCN allergy       - Blood cultures 9/23, 9/25 reporting Staphylococcus epidermidis  - Repeat blood cultures  9/27 no growth   - RVP/COVID 19 PCR 9/25 negative   - CT A/P 9/23 reporting no acute findings   - CXR 9/23 no PNA  - TTE reporting MV veg  - MUSTAPHA reporting large mobile density MV  - UA 9/25 negative for UTI   - Procalcitonin level not reliable in SANTOS   - PCN allergy but has tolerated Zosyn and Zinacef in the past.   - Vascular surgery following for thrombosed graft. plan for OR Sunday  - Continue Cefazolin   - Due to renal failure will hold off on Gentamicin  - Due to Eliquis unable to use Rifampin   - Follow up cultures  - Trend Fever  - Trend WBC      Will Follow    Discussed treatment plan with: Dr Mullins  and CT Surgery

## 2023-10-01 NOTE — PROGRESS NOTE ADULT - ASSESSMENT
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  Labs consistent w/ SANTOS on CKD.  Hospital course complicated by bacteremia w/ cxs growing staph epidermidus and TTE showing large MV vegetation.          Sepsis POA 2/2 S epididermiditis bacteremia likely endovascular in origin w/ subsequent MV IE   - Repeat BCXs from 09/27 NGTD  - LUE AV graft concerning for infectious source of bacteremia leading to IE  - Plan for graft explantation w/ vascular today 10/01  - Has MV/AV replacements which increases Pts risk of IE in the setting of staph epi bacteremia    - s/p MUSTAPHA w/ evidence of large MV vegitation   - Leukocytosis trending up but remains afebrile   - RVP pcr (-) 09/25, UA w/out pyruia and CXR w/ no evidence of infiltrate/consolidation  - c/w cefazolin as per ID recs   - CT A/P redemonstrates aortic dissection stable from 05/2023 but no acute findings reported   - CTSx consulted for consideration of re-operation of MVR   - As per CTSx will hold off on surgery for now and on contrast studies given pt high surgical risk and risk of progressing to HD again   - If cxs remain negative can tx w/ 4wks of abxs repeat MUSTAPHA to see if vegetation improving as per CTSx  - ID following and recs noted   - Monitor CBC and temperature       SANTOS on CKD III likely prerenal azotemia 2/2 GI losses and poor po intake  AGMA 2/2 uremia/SANTOS  Hypovolemic hyponatremia, resolved  - Cr improving since admission (baseline 1.5-1.8)  - N/V resolved   - Required short term HD in recent past   - Serum HCO3 improving slowly w/ bicarb gtt per nephro recs  - c/w sodium HCO3 po w/ caution in light of HF  - Continue to hold of on diuretic given hypovolemia   - Avoid nephrotoxic meds or renally dose if needed   - Nephrology following and recs noted       Chronic HFpEF  - Clinically hypovolemic at this time   - Follows Dr. Frankel   - c/w Coreg  - Torsemide held in light of dehydration on admission resume on d/c or sooner if needed  - MUSTAPHA w/ LVEF 55-60%  - Monitor daily weights and I/O's  - Cardio following and recs noted      paroxysmal Atrial fibrillation   - Rate controlled currently   - CHADSVASC of 5 and as per cardiology no need to bridge prior to endovascular procedure tomorrow   - c/w BB for rate control  - Briefly held AC when had SDH but since resumed after 07/2023 due CTH w/ complete resolution of SDH  - AC currently on hold for AV graft exploration today; resume once cleared by vascular after procedure       HTN  - c/w amlodipine and carvedilol       Troponemia   - Likely from poor clearance in the setting of SANTOS on CKD  - Plateaued w/ no EKG changes   - No chest pain       Intractable Nausea / Vomiting   - Resolved       SDH   - CTH has shown complete resolution since July 2023      Mitral/Aortic valve replacement   - Recent MUSTAPHA shows bioprosthetic valves with only moderate paravalvular leak in MV  - Now w/ large vegetation on MV  - CTSx following but no plan for surg intervention at this time        VTE ppx: SCDs and resume AC once cleared by vascular after AV graft exploration.     Dispo: remains acute

## 2023-10-01 NOTE — PROGRESS NOTE ADULT - ASSESSMENT
64yo Male with extensive medical hx including CKD s/p LUE AV loop graft, no HD required since 10/2022(renal recovery)  , admitted for sepsis with strep epi bacteremia and endocarditis with concern that patient's AVG is source of infection. Months of L arm pain. Graft is thrombosed. plan for excision today  Plan:   - Cont to hold eliquis  - CARDS evaluated, high risk RCRI IV. Cleared for OR   - Plan for graft explantation  - Please pre-op on today  , NPO, diet postop  - Continue IV abx per ID  - Pain control   -Remainder of care per primary

## 2023-10-01 NOTE — BRIEF OPERATIVE NOTE - NSICDXBRIEFPOSTOP_GEN_ALL_CORE_FT
POST-OP DIAGNOSIS:  Infected prosthetic vascular graft, initial encounter 01-Oct-2023 14:06:30  Thanh Mandel

## 2023-10-01 NOTE — BRIEF OPERATIVE NOTE - OPERATION/FINDINGS
Indurated area was incised and purulence expressed. Brachial artery with defect at site of anastomosis and thrombosis in the vessel lumen. The graft was removed and venotomy repaired. Brachial artery repaired with bovine pericardial patch.

## 2023-10-01 NOTE — PROGRESS NOTE ADULT - SUBJECTIVE AND OBJECTIVE BOX
Chief Complaint:  SANTOS    SUBJECTIVE / OVERNIGHT EVENTS: No acute events reported overnight.  pt offers no acute complaints at this time.       PHYSICAL EXAM:  Vital Signs Last 24 Hrs  T(C): 36.4 (01 Oct 2023 10:52), Max: 36.6 (30 Sep 2023 17:06)  T(F): 97.5 (01 Oct 2023 10:52), Max: 97.9 (30 Sep 2023 17:06)  HR: 58 (01 Oct 2023 10:52) (57 - 59)  BP: 114/56 (01 Oct 2023 10:52) (106/55 - 118/66)  BP(mean): --  RR: 18 (01 Oct 2023 10:52) (18 - 18)  SpO2: 95% (01 Oct 2023 10:52) (91% - 95%)    Parameters below as of 01 Oct 2023 10:52  Patient On (Oxygen Delivery Method): room air          GENERAL: pt examined bedside, laying comfortably in bed in NAD  HEENT: NC/AT, dry oral mucosa, clear conjunctiva, sclera nonicteric  RESPIRATORY: CTA b/l, no wheezing, rhonchi, rales  CARDIOVASCULAR: RRR, normal S1 and S2  ABDOMEN: soft, NT/ND, +BS, no rebound/guarding  MUSCLOSKELETAL:  no joint swelling or tenderness to palpation  EXTREMITIES: No cynaosis, no clubbing, no lower extremity edema  NEUROLOGY: A+O x3, no focal neurologic deficits appreciated   SKIN: No rashes or no palpable lesions, poor turgur        LABS:                                        11.1   20.84 )-----------( 157      ( 01 Oct 2023 05:20 )             35.1       10-01    135  |  102  |  43.7<H>  ----------------------------<  85  3.8   |  18.0<L>  |  1.93<H>    Ca    8.2<L>      01 Oct 2023 05:20  Phos  3.4     10-01  Mg     1.9     10-01    TPro  6.3<L>  /  Alb  2.2<L>  /  TBili  0.4  /  DBili  x   /  AST  59<H>  /  ALT  15  /  AlkPhos  174<H>  10-01      Urinalysis Basic - ( 30 Sep 2023 06:12 )    Color: x / Appearance: x / SG: x / pH: x  Gluc: 103 mg/dL / Ketone: x  / Bili: x / Urobili: x   Blood: x / Protein: x / Nitrite: x   Leuk Esterase: x / RBC: x / WBC x   Sq Epi: x / Non Sq Epi: x / Bacteria: x        Culture - Blood (collected 27 Sep 2023 14:47)  Source: .Blood Blood-Peripheral  Preliminary Report (29 Sep 2023 22:01):    No growth at 48 Hours    Culture - Blood (collected 27 Sep 2023 14:42)  Source: .Blood Blood-Peripheral  Preliminary Report (29 Sep 2023 22:01):    No growth at 48 Hours      CAPILLARY BLOOD GLUCOSE            RADIOLOGY & ADDITIONAL TESTS:    < from: CT Head No Cont (09.28.23 @ 11:12) >  IMPRESSION:  No acute intracranial hemorrhage.    < end of copied text >      < from: CT Maxillofacial No Cont (09.28.23 @ 11:12) >  IMPRESSION: Odontogenic disease.    < end of copied text >        < from: [facility] Echo Doppler (09.27.23 @ 10:59) >  Summary:   1. Left ventricular ejection fraction, by visual estimation, is 55 to   60%.   2. Severely enlarged left atrium.   3. Mild mitral valve regurgitation.   4. Mitral prosthesis vegetation.   5. There is a bioprosthetic valve in the mitral position with thickened   leaflets yet leaflet mobility is fairly preserved. There is trace to mild   valvular regurgitation with no perivalvular leaks. No rocking motion or   dehiscence noted. There is a large and mobile vegetation measuring   1.8x1.0 cm attached to the atrial surface of the medial bioprosthetic   leaflet. There is evidence of at least mild valve stenosis with a mean   transvalvular gradient of 5 mmHg at a heart rate of 60 bpm. The mitral   valve area by 3D is 1.57 cm2.   6. Mild tricuspid regurgitation.   7. Bioprosthesis inthe aortic position.   8. Dilatation of the aortic root.    < end of copied text >      MEDICATIONS  (STANDING):  amLODIPine   Tablet 10 milliGRAM(s) Oral daily  atorvastatin 40 milliGRAM(s) Oral at bedtime  carvedilol 6.25 milliGRAM(s) Oral every 12 hours  ceFAZolin  Injectable. 2000 milliGRAM(s) IV Push every 8 hours  dextrose 5% + sodium chloride 0.45% 1000 milliLiter(s) (82 mL/Hr) IV Continuous <Continuous>  levETIRAcetam 1000 milliGRAM(s) Oral two times a day  sodium bicarbonate 650 milliGRAM(s) Oral every 8 hours  sodium bicarbonate  Infusion 0.081 mEq/kG/Hr (83 mL/Hr) IV Continuous <Continuous>  tamsulosin 0.4 milliGRAM(s) Oral at bedtime    MEDICATIONS  (PRN):  acetaminophen     Tablet .. 650 milliGRAM(s) Oral every 6 hours PRN Temp greater or equal to 38C (100.4F), Mild Pain (1 - 3)  aluminum hydroxide/magnesium hydroxide/simethicone Suspension 30 milliLiter(s) Oral every 4 hours PRN Dyspepsia  guaiFENesin Oral Liquid (Sugar-Free) 100 milliGRAM(s) Oral every 6 hours PRN Cough  melatonin 3 milliGRAM(s) Oral at bedtime PRN Insomnia  ondansetron Injectable 4 milliGRAM(s) IV Push every 8 hours PRN Nausea and/or Vomiting

## 2023-10-01 NOTE — PROGRESS NOTE ADULT - SUBJECTIVE AND OBJECTIVE BOX
NEPHROLOGY INTERVAL HPI/OVERNIGHT EVENTS:    Examined earlier  No distress     MEDICATIONS  (STANDING):  amLODIPine   Tablet 10 milliGRAM(s) Oral daily  atorvastatin 40 milliGRAM(s) Oral at bedtime  carvedilol 6.25 milliGRAM(s) Oral every 12 hours  ceFAZolin  Injectable. 2000 milliGRAM(s) IV Push every 8 hours  dextrose 5% + sodium chloride 0.45% 1000 milliLiter(s) (82 mL/Hr) IV Continuous <Continuous>  levETIRAcetam 1000 milliGRAM(s) Oral two times a day  sodium bicarbonate 650 milliGRAM(s) Oral every 8 hours  sodium bicarbonate  Infusion 0.081 mEq/kG/Hr (83 mL/Hr) IV Continuous <Continuous>  tamsulosin 0.4 milliGRAM(s) Oral at bedtime    MEDICATIONS  (PRN):  acetaminophen     Tablet .. 650 milliGRAM(s) Oral every 6 hours PRN Temp greater or equal to 38C (100.4F), Mild Pain (1 - 3)  aluminum hydroxide/magnesium hydroxide/simethicone Suspension 30 milliLiter(s) Oral every 4 hours PRN Dyspepsia  guaiFENesin Oral Liquid (Sugar-Free) 100 milliGRAM(s) Oral every 6 hours PRN Cough  melatonin 3 milliGRAM(s) Oral at bedtime PRN Insomnia  ondansetron Injectable 4 milliGRAM(s) IV Push every 8 hours PRN Nausea and/or Vomiting      Allergies    penicillin (Other)    Intolerances        Vital Signs Last 24 Hrs  T(C): 36.4 (01 Oct 2023 10:52), Max: 36.6 (30 Sep 2023 17:06)  T(F): 97.5 (01 Oct 2023 10:52), Max: 97.9 (30 Sep 2023 17:06)  HR: 58 (01 Oct 2023 10:52) (57 - 59)  BP: 114/56 (01 Oct 2023 10:52) (106/55 - 118/66)  BP(mean): --  RR: 18 (01 Oct 2023 10:52) (18 - 18)  SpO2: 95% (01 Oct 2023 10:52) (91% - 95%)    Parameters below as of 01 Oct 2023 10:52  Patient On (Oxygen Delivery Method): room air      PHYSICAL EXAM:  GENERAL: NAD  EYES: EOMI  NECK: Supple  NERVOUS SYSTEM:  A/O x3  CHEST:  No rales   HEART:  RRR, No murmur  ABDOMEN: Soft, NT/ND BS+  EXTREMITIES:  No Edema    LABS:                        11.1   20.84 )-----------( 157      ( 01 Oct 2023 05:20 )             35.1     10-01    135  |  102  |  43.7<H>  ----------------------------<  85  3.8   |  18.0<L>  |  1.93<H>    Ca    8.2<L>      01 Oct 2023 05:20  Phos  3.4     10-01  Mg     1.9     10-01    TPro  6.3<L>  /  Alb  2.2<L>  /  TBili  0.4  /  DBili  x   /  AST  59<H>  /  ALT  15  /  AlkPhos  174<H>  10-01    PT/INR - ( 01 Oct 2023 08:30 )   PT: 13.6 sec;   INR: 1.23 ratio         PTT - ( 01 Oct 2023 08:30 )  PTT:30.1 sec  Urinalysis Basic - ( 01 Oct 2023 05:20 )    Color: x / Appearance: x / SG: x / pH: x  Gluc: 85 mg/dL / Ketone: x  / Bili: x / Urobili: x   Blood: x / Protein: x / Nitrite: x   Leuk Esterase: x / RBC: x / WBC x   Sq Epi: x / Non Sq Epi: x / Bacteria: x      Magnesium: 1.9 mg/dL (10-01 @ 05:20)  Phosphorus: 3.4 mg/dL (10-01 @ 05:20)          RADIOLOGY & ADDITIONAL TESTS:

## 2023-10-01 NOTE — PROGRESS NOTE ADULT - SUBJECTIVE AND OBJECTIVE BOX
[facility]  INFECTIOUS DISEASES at [place] / [place] / [place]  =======================================================                               Roosevelt Victor MD#   Fabián Quigley MD*                             Valentine Handley MD*   Reba Catalan MD*                              Professor Emeritus:  Dr Johnny Hathaway MD^            Diplomates American Board of Internal Medicine & Infectious Diseases                # [place] Office - Appt - Tel  [phone] Fax [phone]                * [place] Office - Appt - Tel [phone] Fax [phone]                      ^[place] Office - Tel  [phone] Fax [phone]                                  Hospital Consult line:  [phone]  =======================================================    JONATHAN GIBSON [id number]    Follow up: MV Endocarditis     s/p Left AV graft removal 10/1/23    no fevers       Allergies:  penicillin (Other)       REVIEW OF SYSTEMS:  CONSTITUTIONAL:  No Fever or chills  HEENT:  No diplopia or blurred vision.  No earache, sore throat or runny nose.  CARDIOVASCULAR:  No chest pain  RESPIRATORY:  No cough, shortness of breath  GASTROINTESTINAL:  No nausea, vomiting or diarrhea.  GENITOURINARY:  No dysuria, frequency or urgency. No Blood in urine  MUSCULOSKELETAL:  no joint aches, no muscle pain  SKIN:  No change in skin, hair or nails.  NEUROLOGIC:  No Headaches      Physical Exam:  GEN: NAD  HEENT: normocephalic and atraumatic. EOMI. PERRL.    NECK: Supple.   LUNGS: CTA B/L.  HEART: RRR  ABDOMEN: Soft, NT, ND.  +BS.    : No CVA tenderness  EXTREMITIES: Without  edema.  MSK: No joint swelling  NEUROLOGIC: No Focal Deficits       Vitals:  T(F): 97.9 (01 Oct 2023 14:25), Max: 97.9 (30 Sep 2023 17:06)  HR: 57 (01 Oct 2023 15:15)  BP: 125/65 (01 Oct 2023 15:15)  RR: 12 (01 Oct 2023 15:15)  SpO2: 100% (01 Oct 2023 15:15) (91% - 100%)  temp max in last 48H T(F): , Max: 97.9 (09-30-23 @ 04:09)    Current Antibiotics:  ceFAZolin  Injectable. 2000 milliGRAM(s) IV Push every 8 hours    Other medications:  amLODIPine   Tablet 10 milliGRAM(s) Oral daily  atorvastatin 40 milliGRAM(s) Oral at bedtime  carvedilol 6.25 milliGRAM(s) Oral every 12 hours  lactated ringers. 1000 milliLiter(s) IV Continuous <Continuous>  levETIRAcetam 1000 milliGRAM(s) Oral two times a day  sodium bicarbonate 650 milliGRAM(s) Oral every 8 hours  sodium bicarbonate  Infusion 0.081 mEq/kG/Hr IV Continuous <Continuous>  tamsulosin 0.4 milliGRAM(s) Oral at bedtime                            11.1   20.84 )-----------( 157      ( 01 Oct 2023 05:20 )             35.1     10-01    135  |  102  |  43.7<H>  ----------------------------<  85  3.8   |  18.0<L>  |  1.93<H>    Ca    8.2<L>      01 Oct 2023 05:20  Phos  3.4     10-01  Mg     1.9     10-01    TPro  6.3<L>  /  Alb  2.2<L>  /  TBili  0.4  /  DBili  x   /  AST  59<H>  /  ALT  15  /  AlkPhos  174<H>  10-01    RECENT CULTURES:  09-27 @ 14:47 .Blood Blood-Peripheral     No growth at 72 Hours    09-27 @ 14:42 .Blood Blood-Peripheral     No growth at 72 Hours    09-25 @ 06:50 .Stool Feces     No enteric pathogens isolated.  (Stool culture examined for Salmonella,  Shigella, Campylobacter, Aeromonas, Plesiomonas,  Vibrio, E.coli O157 and Yersinia)    09-25 @ 06:00    RVP  NotDetec    09-25 @ 05:50 .Blood Blood-Peripheral Staphylococcus epidermidis    Growth in aerobic and anaerobic bottles: Staphylococcus epidermidis  Growth in aerobic bottle: Gram Positive Cocci in Clusters  Growth in anaerobic bottle: Gram Positive Cocci in Clusters    09-25 @ 05:13 .Blood Blood-Peripheral     Growth in aerobic bottle: Staphylococcus epidermidis  "Susceptibilities not performed"  Growth in aerobic bottle: Gram Positive Cocci in Clusters    09-25 @ 01:00 Clean Catch Clean Catch (Midstream)     <10,000 CFU/mL Normal Urogenital Sivan    09-23 @ 19:30 .Blood Blood-Peripheral     Growth in aerobic and anaerobic bottles: Staphylococcus epidermidis  "Susceptibilities not performed"  Growth in aerobic bottle: Gram Positive Cocci in Clusters  Growth in anaerobic bottle: Gram Positive Cocci in Clusters    09-23 @ 19:25 .Blood Blood-Peripheral Blood Culture PCR  Staphylococcus epidermidis    Growth in aerobic and anaerobic bottles: Staphylococcus epidermidis  Direct identification is available within approximately 3-5  hours either by Blood Panel Multiplexed PCR or Direct  MALDI-TOF. Details: [email or web address]  Growth in aerobic bottle: Gram Positive Cocci in Clusters  Growth in anaerobic bottle: Gram Positive Cocci in Clusters      WBC Count: 20.84 K/uL (10-01-23 @ 05:20)  WBC Count: 20.03 K/uL (09-30-23 @ 06:12)  WBC Count: 18.17 K/uL (09-29-23 @ 12:15)  WBC Count: 16.33 K/uL (09-28-23 @ 05:40)  WBC Count: 16.72 K/uL (09-27-23 @ 05:08)    Creatinine: 1.93 mg/dL (10-01-23 @ 05:20)  Creatinine: 1.97 mg/dL (09-30-23 @ 06:12)  Creatinine: 1.72 mg/dL (09-29-23 @ 12:15)  Creatinine: 2.24 mg/dL (09-27-23 @ 05:08)    Procalcitonin, Serum: 1.75 ng/mL (09-25-23 @ 05:50)     SARS-CoV-2: NotDetec (09-25-23 @ 06:00)        < from: [facility] Echo Doppler (09.27.23 @ 10:59) >  Summary:   1. Left ventricular ejection fraction, by visual estimation, is 55 to 60%.   2. Severely enlarged left atrium.   3. Mild mitral valve regurgitation.   4. Mitral prosthesis vegetation.   5. There is a bioprosthetic valve in the mitral position with thickened   leaflets yet leaflet mobility is fairly preserved. There is trace to mild   valvular regurgitation with no perivalvular leaks. No rocking motion or   dehiscence noted. There is a large and mobile vegetation measuring   1.8x1.0 cm attached to the atrial surface of the medial bioprosthetic   leaflet. There is evidence of at least mild valve stenosis with a mean   transvalvular gradient of 5 mmHg at a heart rate of 60 bpm. The mitral   valve area by 3D is 1.57 cm2.   6. Mild tricuspid regurgitation.   7. Bioprosthesis in the aortic position.   8. Dilatation of the aortic root.    < end of copied text >      < from: TTE Echo Complete w/o Contrast w/ Doppler (09.26.23 @ 14:32) >  Summary:   1. Left ventricular ejection fraction, by visual estimation, is 50 to   55%.   2. Normal global left ventricular systolic function.   3. Diastolic function indeterminate.   4. Abnormal septal motion consistent with post-operative status.   5. Normal RV size with mildly reduced RV systolic function, inadequate   estimation of RVSP.   6. Bioprosthetic mitral valve present with a large mobile lesion (at   least 2.0 cm x 1.5 cm) prolapsing into and out of the LV-LA cavity,   hightly suggestive of vegetation. Mean transmitral valve gradient 4.8   mmHg (at HR of 60 bpm).   7. Bioprosthesis in the aortic position, mean gradient of 9 mmHg.   8. There is no evidence of pericardial effusion.   9. Compared to the prior TTE study from 5/23/23, lesion over bioMVR is   again noted and is now much larger and recommend MUSTAPHA study to confirm   vegetation and exclude other valvular involvement.    < end of copied text >

## 2023-10-01 NOTE — PROGRESS NOTE ADULT - ASSESSMENT
Improving  SANTOS on CKD stage III serum cr overall improving   - Baseline 1.6 ---> h/o temporary HD ending in Nov 2021 --> was on HD x 1 year   No hydronephrosis on CT 9/23   Vanco level was 28 on 9/28-  likely contributing to rise in serum Cr     Sepsis with Staph epidermidis bacteremia and prosthetic MV endocarditis  Thrombosed L UE AVG likely source  Going to OR w Vascular possibly today  ID eval appreciated, PCN allery--> on Cefazolin    MA - likely 2/2 CKD cont IVF w added bicarb  Mild hyponatremia persisting, IVF made somewhat hypertonic  Will check urin studies    Renally dose meds  Avoid nephrotoxic agents  AM labs will follow

## 2023-10-01 NOTE — CHART NOTE - NSCHARTNOTEFT_GEN_A_CORE
Patient seen and examined at bedside.     MEDICATIONS  (STANDING):  amLODIPine   Tablet 10 milliGRAM(s) Oral daily  atorvastatin 40 milliGRAM(s) Oral at bedtime  carvedilol 6.25 milliGRAM(s) Oral every 12 hours  ceFAZolin  Injectable. 2000 milliGRAM(s) IV Push every 8 hours  lactated ringers. 1000 milliLiter(s) (75 mL/Hr) IV Continuous <Continuous>  levETIRAcetam 1000 milliGRAM(s) Oral two times a day  sodium bicarbonate 650 milliGRAM(s) Oral every 8 hours  sodium bicarbonate  Infusion 0.081 mEq/kG/Hr (83 mL/Hr) IV Continuous <Continuous>  tamsulosin 0.4 milliGRAM(s) Oral at bedtime    MEDICATIONS  (PRN):  acetaminophen     Tablet .. 650 milliGRAM(s) Oral every 6 hours PRN Temp greater or equal to 38C (100.4F), Mild Pain (1 - 3)  aluminum hydroxide/magnesium hydroxide/simethicone Suspension 30 milliLiter(s) Oral every 4 hours PRN Dyspepsia  guaiFENesin Oral Liquid (Sugar-Free) 100 milliGRAM(s) Oral every 6 hours PRN Cough  HYDROmorphone  Injectable 0.25 milliGRAM(s) IV Push every 10 minutes PRN Moderate Pain (4 - 6)  melatonin 3 milliGRAM(s) Oral at bedtime PRN Insomnia  ondansetron Injectable 4 milliGRAM(s) IV Push every 8 hours PRN Nausea and/or Vomiting    Vital Signs Last 24 Hrs  T(C): 36.6 (01 Oct 2023 14:25), Max: 36.6 (01 Oct 2023 14:25)  T(F): 97.9 (01 Oct 2023 14:25), Max: 97.9 (01 Oct 2023 14:25)  HR: 55 (01 Oct 2023 18:30) (50 - 59)  BP: 120/57 (01 Oct 2023 18:30) (103/65 - 134/61)  BP(mean): --  RR: 15 (01 Oct 2023 18:30) (12 - 18)  SpO2: 100% (01 Oct 2023 18:30) (91% - 100%)    Parameters below as of 01 Oct 2023 18:30  Patient On (Oxygen Delivery Method): room air                          11.1   20.84 )-----------( 157      ( 01 Oct 2023 05:20 )             35.1   10-01    135  |  102  |  43.7<H>  ----------------------------<  85  3.8   |  18.0<L>  |  1.93<H>    Ca    8.2<L>      01 Oct 2023 05:20  Phos  3.4     10-01  Mg     1.9     10-01    TPro  6.3<L>  /  Alb  2.2<L>  /  TBili  0.4  /  DBili  x   /  AST  59<H>  /  ALT  15  /  AlkPhos  174<H>  10-01      Exam:  Gen: pt lying in bed, alert, in NAD  Resp: unlabored  CVS: RRR  Abd: soft, NT, ND  Ext: moving all extremities spontaneously, sensation intact, pulses 2+      Assessment:    Plan:  -Continue neurovasc checks  -Hold dvt ppx and anticoagulation  -Leave dressing in place  -Pain control  -OOB/IS  -Continue home medications  -ADAT Patient seen and examined at bedside.     MEDICATIONS  (STANDING):  amLODIPine   Tablet 10 milliGRAM(s) Oral daily  atorvastatin 40 milliGRAM(s) Oral at bedtime  carvedilol 6.25 milliGRAM(s) Oral every 12 hours  ceFAZolin  Injectable. 2000 milliGRAM(s) IV Push every 8 hours  lactated ringers. 1000 milliLiter(s) (75 mL/Hr) IV Continuous <Continuous>  levETIRAcetam 1000 milliGRAM(s) Oral two times a day  sodium bicarbonate 650 milliGRAM(s) Oral every 8 hours  sodium bicarbonate  Infusion 0.081 mEq/kG/Hr (83 mL/Hr) IV Continuous <Continuous>  tamsulosin 0.4 milliGRAM(s) Oral at bedtime    MEDICATIONS  (PRN):  acetaminophen     Tablet .. 650 milliGRAM(s) Oral every 6 hours PRN Temp greater or equal to 38C (100.4F), Mild Pain (1 - 3)  aluminum hydroxide/magnesium hydroxide/simethicone Suspension 30 milliLiter(s) Oral every 4 hours PRN Dyspepsia  guaiFENesin Oral Liquid (Sugar-Free) 100 milliGRAM(s) Oral every 6 hours PRN Cough  HYDROmorphone  Injectable 0.25 milliGRAM(s) IV Push every 10 minutes PRN Moderate Pain (4 - 6)  melatonin 3 milliGRAM(s) Oral at bedtime PRN Insomnia  ondansetron Injectable 4 milliGRAM(s) IV Push every 8 hours PRN Nausea and/or Vomiting    Vital Signs Last 24 Hrs  T(C): 36.6 (01 Oct 2023 14:25), Max: 36.6 (01 Oct 2023 14:25)  T(F): 97.9 (01 Oct 2023 14:25), Max: 97.9 (01 Oct 2023 14:25)  HR: 55 (01 Oct 2023 18:30) (50 - 59)  BP: 120/57 (01 Oct 2023 18:30) (103/65 - 134/61)  BP(mean): --  RR: 15 (01 Oct 2023 18:30) (12 - 18)  SpO2: 100% (01 Oct 2023 18:30) (91% - 100%)    Parameters below as of 01 Oct 2023 18:30  Patient On (Oxygen Delivery Method): room air                          11.1   20.84 )-----------( 157      ( 01 Oct 2023 05:20 )             35.1   10-01    135  |  102  |  43.7<H>  ----------------------------<  85  3.8   |  18.0<L>  |  1.93<H>    Ca    8.2<L>      01 Oct 2023 05:20  Phos  3.4     10-01  Mg     1.9     10-01    TPro  6.3<L>  /  Alb  2.2<L>  /  TBili  0.4  /  DBili  x   /  AST  59<H>  /  ALT  15  /  AlkPhos  174<H>  10-01      Exam:  Gen: pt lying in bed, alert, in NAD  Resp: unlabored  CVS: RRR  Abd: soft, NT, ND  Ext: moving all extremities spontaneously, sensation intact,, triphasic ulnar signal      Assessment: Patient is a medically complex 64 y/o male here w/ bacteremia in the setting of endocarditis secondary to an infected AF loop graft. Now s/p graft excision, washout and brachial artery patch repair.    Plan:  -Continue neurovasc checks  -Hold dvt ppx and anticoagulation  -Leave dressing in place  -Pain control  -OOB/IS  -Continue home medications  -DASH diet  -Abx per ID  -Remainder of care per SICU Patient seen and examined at bedside. Aside from some pain in the left arm is doing well. Denies any new motor/sensory symptoms. Doing well.    MEDICATIONS  (STANDING):  amLODIPine   Tablet 10 milliGRAM(s) Oral daily  atorvastatin 40 milliGRAM(s) Oral at bedtime  carvedilol 6.25 milliGRAM(s) Oral every 12 hours  ceFAZolin  Injectable. 2000 milliGRAM(s) IV Push every 8 hours  lactated ringers. 1000 milliLiter(s) (75 mL/Hr) IV Continuous <Continuous>  levETIRAcetam 1000 milliGRAM(s) Oral two times a day  sodium bicarbonate 650 milliGRAM(s) Oral every 8 hours  sodium bicarbonate  Infusion 0.081 mEq/kG/Hr (83 mL/Hr) IV Continuous <Continuous>  tamsulosin 0.4 milliGRAM(s) Oral at bedtime    MEDICATIONS  (PRN):  acetaminophen     Tablet .. 650 milliGRAM(s) Oral every 6 hours PRN Temp greater or equal to 38C (100.4F), Mild Pain (1 - 3)  aluminum hydroxide/magnesium hydroxide/simethicone Suspension 30 milliLiter(s) Oral every 4 hours PRN Dyspepsia  guaiFENesin Oral Liquid (Sugar-Free) 100 milliGRAM(s) Oral every 6 hours PRN Cough  HYDROmorphone  Injectable 0.25 milliGRAM(s) IV Push every 10 minutes PRN Moderate Pain (4 - 6)  melatonin 3 milliGRAM(s) Oral at bedtime PRN Insomnia  ondansetron Injectable 4 milliGRAM(s) IV Push every 8 hours PRN Nausea and/or Vomiting    Vital Signs Last 24 Hrs  T(C): 36.6 (01 Oct 2023 14:25), Max: 36.6 (01 Oct 2023 14:25)  T(F): 97.9 (01 Oct 2023 14:25), Max: 97.9 (01 Oct 2023 14:25)  HR: 55 (01 Oct 2023 18:30) (50 - 59)  BP: 120/57 (01 Oct 2023 18:30) (103/65 - 134/61)  BP(mean): --  RR: 15 (01 Oct 2023 18:30) (12 - 18)  SpO2: 100% (01 Oct 2023 18:30) (91% - 100%)    Parameters below as of 01 Oct 2023 18:30  Patient On (Oxygen Delivery Method): room air                          11.1   20.84 )-----------( 157      ( 01 Oct 2023 05:20 )             35.1   10-01    135  |  102  |  43.7<H>  ----------------------------<  85  3.8   |  18.0<L>  |  1.93<H>    Ca    8.2<L>      01 Oct 2023 05:20  Phos  3.4     10-01  Mg     1.9     10-01    TPro  6.3<L>  /  Alb  2.2<L>  /  TBili  0.4  /  DBili  x   /  AST  59<H>  /  ALT  15  /  AlkPhos  174<H>  10-01      Exam:  Gen: pt lying in bed, alert, in NAD  Resp: unlabored  CVS: RRR  Abd: soft, NT, ND  Ext: moving all extremities spontaneously, sensation intact,, palpable ulnar pulse. Remainder of arm is bandaged. Neurovascularly intact.       Assessment: Patient is a medically complex 62 y/o male here w/ bacteremia in the setting of endocarditis secondary to an infected AF loop graft. Now s/p graft excision, washout and brachial artery patch repair.    Plan:  -Continue q2 neurovasc checks  -Hold dvt ppx and anticoagulation  -Leave dressing in place  -Pain control  -OOB/IS  -Continue home medications  -DASH diet  -Abx per ID  -Remainder of care per SICU

## 2023-10-01 NOTE — PROGRESS NOTE ADULT - SUBJECTIVE AND OBJECTIVE BOX
HPI/OVERNIGHT EVENTS:  NAEON. some L arm pain,a febrile, pending OR today    MEDICATIONS  (STANDING):  amLODIPine   Tablet 10 milliGRAM(s) Oral daily  atorvastatin 40 milliGRAM(s) Oral at bedtime  carvedilol 6.25 milliGRAM(s) Oral every 12 hours  ceFAZolin  Injectable. 2000 milliGRAM(s) IV Push every 8 hours  dextrose 5% + sodium chloride 0.45% 1000 milliLiter(s) (82 mL/Hr) IV Continuous <Continuous>  levETIRAcetam 1000 milliGRAM(s) Oral two times a day  sodium bicarbonate 650 milliGRAM(s) Oral every 8 hours  sodium bicarbonate  Infusion 0.081 mEq/kG/Hr (83 mL/Hr) IV Continuous <Continuous>  tamsulosin 0.4 milliGRAM(s) Oral at bedtime    MEDICATIONS  (PRN):  acetaminophen     Tablet .. 650 milliGRAM(s) Oral every 6 hours PRN Temp greater or equal to 38C (100.4F), Mild Pain (1 - 3)  aluminum hydroxide/magnesium hydroxide/simethicone Suspension 30 milliLiter(s) Oral every 4 hours PRN Dyspepsia  guaiFENesin Oral Liquid (Sugar-Free) 100 milliGRAM(s) Oral every 6 hours PRN Cough  melatonin 3 milliGRAM(s) Oral at bedtime PRN Insomnia  ondansetron Injectable 4 milliGRAM(s) IV Push every 8 hours PRN Nausea and/or Vomiting        Vital Signs Last 24 Hrs  T(C): 36.4 (01 Oct 2023 04:07), Max: 36.6 (30 Sep 2023 17:06)  T(F): 97.5 (01 Oct 2023 04:07), Max: 97.9 (30 Sep 2023 17:06)  HR: 58 (01 Oct 2023 04:07) (57 - 59)  BP: 106/55 (01 Oct 2023 04:07) (106/55 - 118/66)  BP(mean): --  RR: 18 (01 Oct 2023 04:07) (18 - 18)  SpO2: 91% (01 Oct 2023 04:07) (91% - 96%)    Parameters below as of 30 Sep 2023 11:11  Patient On (Oxygen Delivery Method): room air            Constitutional: patient resting comfortably in bed, in no acute distress  Respiratory: respirations are unlabored, no accessory muscle use, no conversational dyspnea  Neurological: A&O x 3  Musculoskeletal: LUE with non-localized pain, minimal erythema overlying AVG, small area of fluctuance superior   Vascular: palpable radial pulse (L) , no thrill over AVG       I&O's Detail      LABS:                                      12.0   20.03 )-----------( 182      ( 30 Sep 2023 06:12 )             37.3   09-30    129<L>  |  99  |  40.4<H>  ----------------------------<  103<H>  3.8   |  17.0<L>  |  1.97<H>    Ca    8.3<L>      30 Sep 2023 06:12

## 2023-10-01 NOTE — CONSULT NOTE ADULT - SUBJECTIVE AND OBJECTIVE BOX
HPI:  Mr. Cummins is a 62 yo M with a PMH significant for HTN, HLD, Type A dissection / repeat sternotomy, and CABG x2, CHF (mixed systolic / diastolic), mitral / aortic valve replacement, CKD (with short term on HD 2022), A.fib s/p ablation pending watchman, SDH (resolved as of July imaging) who presents to [facility] for ~ 2 weeks of inability to tolerate oral intake. Patient states that over the last 2 weeks he has progressively lost the ability to stomach foods, beginning with solids and now also involving liquids when he takes in too much. He states he has been to 2 car shows within the last 2 weeks (both on Sundays) and noticed the symptoms begin the day after the first car show on 9/11. 10-15 minutes after intaking food or large amounts of liquids he will feel the need to vomit and after vomiting he will feel better. The emesis is NBNB only containing what he ate. Due to this he has been unable to tolerate his usual medicines including his blood thinner. He continues to have bowel movements and pass gas, and feels well if he doesn't try to ingest anything. His only remaining symptom was mild SOB on exertion last night and this morning. He denies fevers/chills, dysuria/ anuria, constipation / diarrhea, recent sick contacts or changes to medications. In ED patients vitals were wnl. Lab work showed hyponatremia of 127, with hypochloremia of 91, bicarb of 14, BUN 71.4, and Cr 3.5 as well as elevated WBC to 23.94 and elevated Trp to .09 with BNP 4805. Imaging with CT Abd/pelvis showed no acute pathology. Given 1L LR in ED, to be continued with NS at 50 cc'hr given likely dehydrated status, Nephrology and Cardiology consulted in ED. Patient to be admitted to medical floors for further monitoring.  (23 Sep 2023 17:52)    SICU consulted POD 0 Left AV graft removal- graft was removed and venotomy repaired. Brachial artery repaired with bovine pericardial patch for Q2he vasc checks.  EBL 75cc, 700cc IVF given.          ICU Vital Signs Last 24 Hrs  T(C): 36.4 (01 Oct 2023 10:52), Max: 36.6 (30 Sep 2023 17:06)  T(F): 97.5 (01 Oct 2023 10:52), Max: 97.9 (30 Sep 2023 17:06)  HR: 58 (01 Oct 2023 10:52) (57 - 59)  BP: 114/56 (01 Oct 2023 10:52) (106/55 - 118/66)  BP(mean): --  ABP: --  ABP(mean): --  RR: 18 (01 Oct 2023 10:52) (18 - 18)  SpO2: 95% (01 Oct 2023 10:52) (91% - 95%)    O2 Parameters below as of 01 Oct 2023 10:52  Patient On (Oxygen Delivery Method): room air            I&O's Detail            MEDICATIONS  (STANDING):  amLODIPine   Tablet 10 milliGRAM(s) Oral daily  atorvastatin 40 milliGRAM(s) Oral at bedtime  carvedilol 6.25 milliGRAM(s) Oral every 12 hours  ceFAZolin  Injectable. 2000 milliGRAM(s) IV Push every 8 hours  dextrose 5% + sodium chloride 0.45% 1000 milliLiter(s) (82 mL/Hr) IV Continuous <Continuous>  levETIRAcetam 1000 milliGRAM(s) Oral two times a day  sodium bicarbonate 650 milliGRAM(s) Oral every 8 hours  sodium bicarbonate  Infusion 0.081 mEq/kG/Hr (83 mL/Hr) IV Continuous <Continuous>  tamsulosin 0.4 milliGRAM(s) Oral at bedtime    MEDICATIONS  (PRN):  acetaminophen     Tablet .. 650 milliGRAM(s) Oral every 6 hours PRN Temp greater or equal to 38C (100.4F), Mild Pain (1 - 3)  aluminum hydroxide/magnesium hydroxide/simethicone Suspension 30 milliLiter(s) Oral every 4 hours PRN Dyspepsia  guaiFENesin Oral Liquid (Sugar-Free) 100 milliGRAM(s) Oral every 6 hours PRN Cough  melatonin 3 milliGRAM(s) Oral at bedtime PRN Insomnia  ondansetron Injectable 4 milliGRAM(s) IV Push every 8 hours PRN Nausea and/or Vomiting      NUTRITION/IVF: DASH/D5 1/2 NS      PHYSICAL EXAM:    Gen:  NAD     Eyes:  EOMI's BL    Neurological:  A&O x3    ENMT:  MMM      Neck:  Trachea midline, supple    Pulmonary:  Unlabored RA    Cardiovascular:  NSR, triphasic Lt ulna artery    Gastrointestinal: Soft NTND    Genitourinary:  Voids    Extremities:  AFROM    Skin:  Post op dressing C/D/I          LABS:  CBC Full  -  ( 01 Oct 2023 05:20 )  WBC Count : 20.84 K/uL  RBC Count : 4.05 M/uL  Hemoglobin : 11.1 g/dL  Hematocrit : 35.1 %  Platelet Count - Automated : 157 K/uL  Mean Cell Volume : 86.7 fl  Mean Cell Hemoglobin : 27.4 pg  Mean Cell Hemoglobin Concentration : 31.6 gm/dL  Auto Neutrophil # : 17.98 K/uL  Auto Lymphocyte # : 0.99 K/uL  Auto Monocyte # : 1.29 K/uL  Auto Eosinophil # : 0.40 K/uL  Auto Basophil # : 0.06 K/uL  Auto Neutrophil % : 86.2 %  Auto Lymphocyte % : 4.8 %  Auto Monocyte % : 6.2 %  Auto Eosinophil % : 1.9 %  Auto Basophil % : 0.3 %    10-01    135  |  102  |  43.7<H>  ----------------------------<  85  3.8   |  18.0<L>  |  1.93<H>    Ca    8.2<L>      01 Oct 2023 05:20  Phos  3.4     10-01  Mg     1.9     10-01    TPro  6.3<L>  /  Alb  2.2<L>  /  TBili  0.4  /  DBili  x   /  AST  59<H>  /  ALT  15  /  AlkPhos  174<H>  10-01    PT/INR - ( 01 Oct 2023 08:30 )   PT: 13.6 sec;   INR: 1.23 ratio         PTT - ( 01 Oct 2023 08:30 )  PTT:30.1 sec  Urinalysis Basic - ( 01 Oct 2023 05:20 )    Color: x / Appearance: x / SG: x / pH: x  Gluc: 85 mg/dL / Ketone: x  / Bili: x / Urobili: x   Blood: x / Protein: x / Nitrite: x   Leuk Esterase: x / RBC: x / WBC x   Sq Epi: x / Non Sq Epi: x / Bacteria: x      RECENT CULTURES:  09-27 .Blood Blood-Peripheral XXXX XXXX   No growth at 72 Hours    09-27 .Blood Blood-Peripheral XXXX XXXX   No growth at 72 Hours    09-25 .Stool Feces XXXX XXXX   No enteric pathogens isolated.  (Stool culture examined for Salmonella,  Shigella, Campylobacter, Aeromonas, Plesiomonas,  Vibrio, E.coli O157 and Yersinia)    09-25 .Blood Blood-Peripheral Staphylococcus epidermidis   Growth in aerobic bottle: Gram Positive Cocci in Clusters  Growth in anaerobic bottle: Gram Positive Cocci in Clusters   Growth in aerobic and anaerobic bottles: Staphylococcus epidermidis    09-25 .Blood Blood-Peripheral XXXX   Growth in aerobic bottle: Gram Positive Cocci in Clusters   Growth in aerobic bottle: Staphylococcus epidermidis  "Susceptibilities not performed"    09-25 Clean Catch Clean Catch (Midstream) XXXX XXXX   <10,000 CFU/mL Normal Urogenital Sivan        LIVER FUNCTIONS - ( 01 Oct 2023 05:20 )  Alb: 2.2 g/dL / Pro: 6.3 g/dL / ALK PHOS: 174 U/L / ALT: 15 U/L / AST: 59 U/L / GGT: x               CAPILLARY BLOOD GLUCOSE      RADIOLOGY & ADDITIONAL STUDIES:    ASSESSMENT/PLAN:  63yMale presenting with:  infected Lt loop graft, endocarditis    Admit to SDU    Q2hr Vasc checks  -pt with current baseline triphasic ulna signal  -no distal neurovascular deficits    Hemodynamically normal- on home meds  RA  DASH Diet  Abx per ID  Voids    Dr Mcmahan aware

## 2023-10-02 LAB
ALBUMIN SERPL ELPH-MCNC: 2.1 G/DL — LOW (ref 3.3–5.2)
ALP SERPL-CCNC: 143 U/L — HIGH (ref 40–120)
ALT FLD-CCNC: <5 U/L — SIGNIFICANT CHANGE UP
ANION GAP SERPL CALC-SCNC: 13 MMOL/L — SIGNIFICANT CHANGE UP (ref 5–17)
AST SERPL-CCNC: 32 U/L — SIGNIFICANT CHANGE UP
BASOPHILS # BLD AUTO: 0.01 K/UL — SIGNIFICANT CHANGE UP (ref 0–0.2)
BASOPHILS NFR BLD AUTO: 0.1 % — SIGNIFICANT CHANGE UP (ref 0–2)
BILIRUB SERPL-MCNC: 0.3 MG/DL — LOW (ref 0.4–2)
BUN SERPL-MCNC: 46 MG/DL — HIGH (ref 8–20)
CALCIUM SERPL-MCNC: 8 MG/DL — LOW (ref 8.4–10.5)
CHLORIDE SERPL-SCNC: 101 MMOL/L — SIGNIFICANT CHANGE UP (ref 96–108)
CO2 SERPL-SCNC: 23 MMOL/L — SIGNIFICANT CHANGE UP (ref 22–29)
CREAT SERPL-MCNC: 1.64 MG/DL — HIGH (ref 0.5–1.3)
CULTURE RESULTS: SIGNIFICANT CHANGE UP
CULTURE RESULTS: SIGNIFICANT CHANGE UP
EGFR: 47 ML/MIN/1.73M2 — LOW
EOSINOPHIL # BLD AUTO: 0.01 K/UL — SIGNIFICANT CHANGE UP (ref 0–0.5)
EOSINOPHIL NFR BLD AUTO: 0.1 % — SIGNIFICANT CHANGE UP (ref 0–6)
GLUCOSE BLDC GLUCOMTR-MCNC: 121 MG/DL — HIGH (ref 70–99)
GLUCOSE SERPL-MCNC: 136 MG/DL — HIGH (ref 70–99)
GRAM STN FLD: SIGNIFICANT CHANGE UP
HCT VFR BLD CALC: 31.5 % — LOW (ref 39–50)
HGB BLD-MCNC: 10.2 G/DL — LOW (ref 13–17)
IMM GRANULOCYTES NFR BLD AUTO: 0.4 % — SIGNIFICANT CHANGE UP (ref 0–0.9)
LYMPHOCYTES # BLD AUTO: 0.73 K/UL — LOW (ref 1–3.3)
LYMPHOCYTES # BLD AUTO: 6.5 % — LOW (ref 13–44)
MAGNESIUM SERPL-MCNC: 2 MG/DL — SIGNIFICANT CHANGE UP (ref 1.6–2.6)
MCHC RBC-ENTMCNC: 27.9 PG — SIGNIFICANT CHANGE UP (ref 27–34)
MCHC RBC-ENTMCNC: 32.4 GM/DL — SIGNIFICANT CHANGE UP (ref 32–36)
MCV RBC AUTO: 86.3 FL — SIGNIFICANT CHANGE UP (ref 80–100)
MONOCYTES # BLD AUTO: 0.61 K/UL — SIGNIFICANT CHANGE UP (ref 0–0.9)
MONOCYTES NFR BLD AUTO: 5.5 % — SIGNIFICANT CHANGE UP (ref 2–14)
NEUTROPHILS # BLD AUTO: 9.77 K/UL — HIGH (ref 1.8–7.4)
NEUTROPHILS NFR BLD AUTO: 87.4 % — HIGH (ref 43–77)
PHOSPHATE SERPL-MCNC: 3.1 MG/DL — SIGNIFICANT CHANGE UP (ref 2.4–4.7)
PLATELET # BLD AUTO: 149 K/UL — LOW (ref 150–400)
POTASSIUM SERPL-MCNC: 3.9 MMOL/L — SIGNIFICANT CHANGE UP (ref 3.5–5.3)
POTASSIUM SERPL-SCNC: 3.9 MMOL/L — SIGNIFICANT CHANGE UP (ref 3.5–5.3)
PROT SERPL-MCNC: 6.1 G/DL — LOW (ref 6.6–8.7)
RBC # BLD: 3.65 M/UL — LOW (ref 4.2–5.8)
RBC # FLD: 13.2 % — SIGNIFICANT CHANGE UP (ref 10.3–14.5)
SODIUM SERPL-SCNC: 137 MMOL/L — SIGNIFICANT CHANGE UP (ref 135–145)
SPECIMEN SOURCE: SIGNIFICANT CHANGE UP
SURGICAL PATHOLOGY STUDY: SIGNIFICANT CHANGE UP
WBC # BLD: 11.18 K/UL — HIGH (ref 3.8–10.5)
WBC # FLD AUTO: 11.18 K/UL — HIGH (ref 3.8–10.5)

## 2023-10-02 PROCEDURE — 99231 SBSQ HOSP IP/OBS SF/LOW 25: CPT

## 2023-10-02 PROCEDURE — 99233 SBSQ HOSP IP/OBS HIGH 50: CPT

## 2023-10-02 PROCEDURE — 99232 SBSQ HOSP IP/OBS MODERATE 35: CPT

## 2023-10-02 RX ORDER — OXYCODONE HYDROCHLORIDE 5 MG/1
5 TABLET ORAL EVERY 4 HOURS
Refills: 0 | Status: DISCONTINUED | OUTPATIENT
Start: 2023-10-02 | End: 2023-10-06

## 2023-10-02 RX ORDER — OXYCODONE HYDROCHLORIDE 5 MG/1
10 TABLET ORAL EVERY 4 HOURS
Refills: 0 | Status: DISCONTINUED | OUTPATIENT
Start: 2023-10-02 | End: 2023-10-06

## 2023-10-02 RX ORDER — POLYETHYLENE GLYCOL 3350 17 G/17G
17 POWDER, FOR SOLUTION ORAL DAILY
Refills: 0 | Status: DISCONTINUED | OUTPATIENT
Start: 2023-10-02 | End: 2023-10-16

## 2023-10-02 RX ORDER — SENNA PLUS 8.6 MG/1
2 TABLET ORAL AT BEDTIME
Refills: 0 | Status: DISCONTINUED | OUTPATIENT
Start: 2023-10-02 | End: 2023-10-16

## 2023-10-02 RX ORDER — HYDROMORPHONE HYDROCHLORIDE 2 MG/ML
1 INJECTION INTRAMUSCULAR; INTRAVENOUS; SUBCUTANEOUS EVERY 4 HOURS
Refills: 0 | Status: DISCONTINUED | OUTPATIENT
Start: 2023-10-02 | End: 2023-10-09

## 2023-10-02 RX ORDER — HEPARIN SODIUM 5000 [USP'U]/ML
5000 INJECTION INTRAVENOUS; SUBCUTANEOUS EVERY 8 HOURS
Refills: 0 | Status: DISCONTINUED | OUTPATIENT
Start: 2023-10-02 | End: 2023-10-10

## 2023-10-02 RX ADMIN — Medication 2000 MILLIGRAM(S): at 05:29

## 2023-10-02 RX ADMIN — CARVEDILOL PHOSPHATE 6.25 MILLIGRAM(S): 80 CAPSULE, EXTENDED RELEASE ORAL at 05:29

## 2023-10-02 RX ADMIN — Medication 650 MILLIGRAM(S): at 21:34

## 2023-10-02 RX ADMIN — HEPARIN SODIUM 5000 UNIT(S): 5000 INJECTION INTRAVENOUS; SUBCUTANEOUS at 21:30

## 2023-10-02 RX ADMIN — HEPARIN SODIUM 5000 UNIT(S): 5000 INJECTION INTRAVENOUS; SUBCUTANEOUS at 14:00

## 2023-10-02 RX ADMIN — AMLODIPINE BESYLATE 10 MILLIGRAM(S): 2.5 TABLET ORAL at 05:30

## 2023-10-02 RX ADMIN — SENNA PLUS 2 TABLET(S): 8.6 TABLET ORAL at 21:33

## 2023-10-02 RX ADMIN — Medication 650 MILLIGRAM(S): at 05:30

## 2023-10-02 RX ADMIN — Medication 650 MILLIGRAM(S): at 12:57

## 2023-10-02 RX ADMIN — Medication 2000 MILLIGRAM(S): at 20:57

## 2023-10-02 RX ADMIN — ATORVASTATIN CALCIUM 40 MILLIGRAM(S): 80 TABLET, FILM COATED ORAL at 21:34

## 2023-10-02 RX ADMIN — Medication 2000 MILLIGRAM(S): at 11:08

## 2023-10-02 RX ADMIN — Medication 650 MILLIGRAM(S): at 05:37

## 2023-10-02 RX ADMIN — TAMSULOSIN HYDROCHLORIDE 0.4 MILLIGRAM(S): 0.4 CAPSULE ORAL at 21:33

## 2023-10-02 RX ADMIN — Medication 650 MILLIGRAM(S): at 06:45

## 2023-10-02 RX ADMIN — Medication 30 MILLILITER(S): at 21:25

## 2023-10-02 RX ADMIN — LEVETIRACETAM 1000 MILLIGRAM(S): 250 TABLET, FILM COATED ORAL at 05:29

## 2023-10-02 RX ADMIN — OXYCODONE HYDROCHLORIDE 10 MILLIGRAM(S): 5 TABLET ORAL at 13:50

## 2023-10-02 RX ADMIN — OXYCODONE HYDROCHLORIDE 10 MILLIGRAM(S): 5 TABLET ORAL at 12:57

## 2023-10-02 RX ADMIN — OXYCODONE HYDROCHLORIDE 10 MILLIGRAM(S): 5 TABLET ORAL at 17:09

## 2023-10-02 RX ADMIN — OXYCODONE HYDROCHLORIDE 10 MILLIGRAM(S): 5 TABLET ORAL at 09:15

## 2023-10-02 RX ADMIN — CARVEDILOL PHOSPHATE 6.25 MILLIGRAM(S): 80 CAPSULE, EXTENDED RELEASE ORAL at 17:09

## 2023-10-02 RX ADMIN — OXYCODONE HYDROCHLORIDE 10 MILLIGRAM(S): 5 TABLET ORAL at 08:19

## 2023-10-02 RX ADMIN — Medication 83 MEQ/KG/HR: at 05:32

## 2023-10-02 RX ADMIN — LEVETIRACETAM 1000 MILLIGRAM(S): 250 TABLET, FILM COATED ORAL at 17:09

## 2023-10-02 RX ADMIN — HYDROMORPHONE HYDROCHLORIDE 1 MILLIGRAM(S): 2 INJECTION INTRAMUSCULAR; INTRAVENOUS; SUBCUTANEOUS at 21:26

## 2023-10-02 NOTE — CHART NOTE - NSCHARTNOTEFT_GEN_A_CORE
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 650 milliGRAM(s) Oral every 6 hours PRN  aluminum hydroxide/magnesium hydroxide/simethicone Suspension 30 milliLiter(s) Oral every 4 hours PRN  amLODIPine   Tablet 10 milliGRAM(s) Oral daily  atorvastatin 40 milliGRAM(s) Oral at bedtime  carvedilol 6.25 milliGRAM(s) Oral every 12 hours  ceFAZolin  Injectable. 2000 milliGRAM(s) IV Push every 8 hours  heparin   Injectable 5000 Unit(s) SubCutaneous every 8 hours  HYDROmorphone  Injectable 1 milliGRAM(s) IV Push every 4 hours PRN  levETIRAcetam 1000 milliGRAM(s) Oral two times a day  melatonin 3 milliGRAM(s) Oral at bedtime PRN  ondansetron Injectable 4 milliGRAM(s) IV Push every 8 hours PRN  oxyCODONE    IR 5 milliGRAM(s) Oral every 4 hours PRN  oxyCODONE    IR 10 milliGRAM(s) Oral every 4 hours PRN  polyethylene glycol 3350 17 Gram(s) Oral daily  senna 2 Tablet(s) Oral at bedtime  sodium bicarbonate 650 milliGRAM(s) Oral every 8 hours  sodium bicarbonate  Infusion 0.081 mEq/kG/Hr IV Continuous <Continuous>  tamsulosin 0.4 milliGRAM(s) Oral at bedtime      [X] I attest I have reviewed and reconciled all medications prior to transfer    Antibiotics:  ceFAZolin  Injectable. 2000 milliGRAM(s) IV Push every 8 hours    Indication: Endocarditis and Bacteremia    I have discussed this case with Vascular Surgery transfer and all questions regarding ICU course were answered.  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 650 milliGRAM(s) Oral every 6 hours PRN  aluminum hydroxide/magnesium hydroxide/simethicone Suspension 30 milliLiter(s) Oral every 4 hours PRN  amLODIPine   Tablet 10 milliGRAM(s) Oral daily  atorvastatin 40 milliGRAM(s) Oral at bedtime  carvedilol 6.25 milliGRAM(s) Oral every 12 hours  ceFAZolin  Injectable. 2000 milliGRAM(s) IV Push every 8 hours  heparin   Injectable 5000 Unit(s) SubCutaneous every 8 hours  HYDROmorphone  Injectable 1 milliGRAM(s) IV Push every 4 hours PRN  levETIRAcetam 1000 milliGRAM(s) Oral two times a day  melatonin 3 milliGRAM(s) Oral at bedtime PRN  ondansetron Injectable 4 milliGRAM(s) IV Push every 8 hours PRN  oxyCODONE    IR 5 milliGRAM(s) Oral every 4 hours PRN  oxyCODONE    IR 10 milliGRAM(s) Oral every 4 hours PRN  polyethylene glycol 3350 17 Gram(s) Oral daily  senna 2 Tablet(s) Oral at bedtime  sodium bicarbonate 650 milliGRAM(s) Oral every 8 hours  sodium bicarbonate  Infusion 0.081 mEq/kG/Hr IV Continuous <Continuous>  tamsulosin 0.4 milliGRAM(s) Oral at bedtime      [X] I attest I have reviewed and reconciled all medications prior to transfer    Antibiotics:  ceFAZolin  Injectable. 2000 milliGRAM(s) IV Push every 8 hours    Indication: Endocarditis and Bacteremia    I have discussed this case with Vascular Surgery transfer and all questions regarding ICU course were answered.  The following items are to be followed up:    1) Plan for 4 weeks of ABx, sensitive to Kefzol, at that time f/u with CT surgery follow up for possible MVR  2) Removal of sutures/packing for excised graft  3) neurovascular checks q4 hr (Radial signal found near hand)   4) FU nephrology for hyponatremia and bicarb gtt

## 2023-10-02 NOTE — PROGRESS NOTE ADULT - SUBJECTIVE AND OBJECTIVE BOX
[facility]  INFECTIOUS DISEASES at [place] / [place] / [place]  =======================================================                               Roosevelt Victor MD#   Fabián Quigley MD*                             Valentine Handley MD*   Reba Catalan MD*                              Professor Emeritus:  Dr Johnny Hathaway MD^            Diplomates American Board of Internal Medicine & Infectious Diseases                # [place] Office - Appt - Tel  [phone] Fax [phone]                * [place] Office - Appt - Tel [phone] Fax [phone]                      ^[place] Office - Tel  [phone] Fax [phone]                                  Hospital Consult line:  [phone]  =======================================================    JONATHAN GIBSON [id number]    Follow up: MV Endocarditis     s/p Left AV graft removal 10/1/23    no fevers       Allergies:  penicillin (Other)       REVIEW OF SYSTEMS:  CONSTITUTIONAL:  No Fever or chills  HEENT:  No diplopia or blurred vision.  No earache, sore throat or runny nose.  CARDIOVASCULAR:  No chest pain  RESPIRATORY:  No cough, shortness of breath  GASTROINTESTINAL:  No nausea, vomiting or diarrhea.  GENITOURINARY:  No dysuria, frequency or urgency. No Blood in urine  MUSCULOSKELETAL:  no joint aches, no muscle pain  SKIN:  No change in skin, hair or nails.  NEUROLOGIC:  No Headaches      Physical Exam:  GEN: NAD  HEENT: normocephalic and atraumatic. EOMI. PERRL.    NECK: Supple.   LUNGS: CTA B/L.  HEART: RRR  ABDOMEN: Soft, NT, ND.  +BS.    : No CVA tenderness  EXTREMITIES: Without  edema.  MSK: No joint swelling  NEUROLOGIC: No Focal Deficits       Vitals:  T(F): 98 (02 Oct 2023 20:50), Max: 98 (02 Oct 2023 20:50)  HR: 67 (02 Oct 2023 20:50)  BP: 133/63 (02 Oct 2023 20:50)  RR: 17 (02 Oct 2023 20:50)  SpO2: 96% (02 Oct 2023 20:50) (94% - 100%)  temp max in last 48H T(F): , Max: 98 (10-01-23 @ 17:30)    Current Antibiotics:  ceFAZolin  Injectable. 2000 milliGRAM(s) IV Push every 8 hours    Other medications:  amLODIPine   Tablet 10 milliGRAM(s) Oral daily  atorvastatin 40 milliGRAM(s) Oral at bedtime  carvedilol 6.25 milliGRAM(s) Oral every 12 hours  heparin   Injectable 5000 Unit(s) SubCutaneous every 8 hours  levETIRAcetam 1000 milliGRAM(s) Oral two times a day  polyethylene glycol 3350 17 Gram(s) Oral daily  senna 2 Tablet(s) Oral at bedtime  sodium bicarbonate 650 milliGRAM(s) Oral every 8 hours  sodium bicarbonate  Infusion 0.081 mEq/kG/Hr IV Continuous <Continuous>  tamsulosin 0.4 milliGRAM(s) Oral at bedtime                            10.2   11.18 )-----------( 149      ( 02 Oct 2023 05:19 )             31.5     10-02    137  |  101  |  46.0<H>  ----------------------------<  136<H>  3.9   |  23.0  |  1.64<H>    Ca    8.0<L>      02 Oct 2023 05:19  Phos  3.1     10-02  Mg     2.0     10-02    TPro  6.1<L>  /  Alb  2.1<L>  /  TBili  0.3<L>  /  DBili  x   /  AST  32  /  ALT  <5  /  AlkPhos  143<H>  10-02    RECENT CULTURES:  10-01 @ 12:00 .Abscess arm abscess     Moderate polymorphonuclear leukocytes seen per low power field  Rare Gram positive cocci in pairs seen per oil power field  Rare Gram Negative Rods seen per oil power field    09-27 @ 14:47 .Blood Blood-Peripheral     No growth at 5 days    09-27 @ 14:42 .Blood Blood-Peripheral     No growth at 5 days    09-25 @ 06:50 .Stool Feces     No enteric pathogens isolated.  (Stool culture examined for Salmonella,  Shigella, Campylobacter, Aeromonas, Plesiomonas,  Vibrio, E.coli O157 and Yersinia)    09-25 @ 06:00    RVP  NotDete    09-25 @ 05:50 .Blood Blood-Peripheral Staphylococcus epidermidis    Growth in aerobic and anaerobic bottles: Staphylococcus epidermidis  Growth in aerobic bottle: Gram Positive Cocci in Clusters  Growth in anaerobic bottle: Gram Positive Cocci in Clusters    09-25 @ 05:13 .Blood Blood-Peripheral     Growth in aerobic bottle: Staphylococcus epidermidis  "Susceptibilities not performed"  Growth in aerobic bottle: Gram Positive Cocci in Clusters    09-25 @ 01:00 Clean Catch Clean Catch (Midstream)     <10,000 CFU/mL Normal Urogenital Sivan    09-23 @ 19:30 .Blood Blood-Peripheral     Growth in aerobic and anaerobic bottles: Staphylococcus epidermidis  "Susceptibilities not performed"  Growth in aerobic bottle: Gram Positive Cocci in Clusters  Growth in anaerobic bottle: Gram Positive Cocci in Clusters    09-23 @ 19:25 .Blood Blood-Peripheral Blood Culture PCR  Staphylococcus epidermidis    Growth in aerobic and anaerobic bottles: Staphylococcus epidermidis  Direct identification is available within approximately 3-5  hours either by Blood Panel Multiplexed PCR or Direct  MALDI-TOF. Details: [email or web address]  Growth in aerobic bottle: Gram Positive Cocci in Clusters  Growth in anaerobic bottle: Gram Positive Cocci in Clusters      WBC Count: 11.18 K/uL (10-02-23 @ 05:19)  WBC Count: 20.84 K/uL (10-01-23 @ 05:20)  WBC Count: 20.03 K/uL (09-30-23 @ 06:12)  WBC Count: 18.17 K/uL (09-29-23 @ 12:15)  WBC Count: 16.33 K/uL (09-28-23 @ 05:40)    Creatinine: 1.64 mg/dL (10-02-23 @ 05:19)  Creatinine: 1.93 mg/dL (10-01-23 @ 05:20)  Creatinine: 1.97 mg/dL (09-30-23 @ 06:12)  Creatinine: 1.72 mg/dL (09-29-23 @ 12:15)        Procalcitonin, Serum: 1.75 ng/mL (09-25-23 @ 05:50)     SARS-CoV-2: NotDetec (09-25-23 @ 06:00)          < from: MUSTAPHA Echo Doppler (09.27.23 @ 10:59) >  Summary:   1. Left ventricular ejection fraction, by visual estimation, is 55 to 60%.   2. Severely enlarged left atrium.   3. Mild mitral valve regurgitation.   4. Mitral prosthesis vegetation.   5. There is a bioprosthetic valve in the mitral position with thickened   leaflets yet leaflet mobility is fairly preserved. There is trace to mild   valvular regurgitation with no perivalvular leaks. No rocking motion or   dehiscence noted. There is a large and mobile vegetation measuring   1.8x1.0 cm attached to the atrial surface of the medial bioprosthetic   leaflet. There is evidence of at least mild valve stenosis with a mean   transvalvular gradient of 5 mmHg at a heart rate of 60 bpm. The mitral   valve area by 3D is 1.57 cm2.   6. Mild tricuspid regurgitation.   7. Bioprosthesis in the aortic position.   8. Dilatation of the aortic root.    < end of copied text >      < from: TTE Echo Complete w/o Contrast w/ Doppler (09.26.23 @ 14:32) >  Summary:   1. Left ventricular ejection fraction, by visual estimation, is 50 to   55%.   2. Normal global left ventricular systolic function.   3. Diastolic function indeterminate.   4. Abnormal septal motion consistent with post-operative status.   5. Normal RV size with mildly reduced RV systolic function, inadequate   estimation of RVSP.   6. Bioprosthetic mitral valve present with a large mobile lesion (at   least 2.0 cm x 1.5 cm) prolapsing into and out of the LV-LA cavity,   hightly suggestive of vegetation. Mean transmitral valve gradient 4.8   mmHg (at HR of 60 bpm).   7. Bioprosthesis in the aortic position, mean gradient of 9 mmHg.   8. There is no evidence of pericardial effusion.   9. Compared to the prior TTE study from 5/23/23, lesion over bioMVR is   again noted and is now much larger and recommend MUSTAPHA study to confirm   vegetation and exclude other valvular involvement.    < end of copied text >

## 2023-10-02 NOTE — PROGRESS NOTE ADULT - ASSESSMENT
Assessment: Patient is a medically complex 62 y/o male here w/ bacteremia in the setting of endocarditis secondary to an infected AF loop graft. Now s/p graft excision, washout and brachial artery patch repair.    Plan:  -Continue q2 neurovasc checks  -Hold dvt ppx and anticoagulation, possibly restart today  -Leave dressing in place  -Pain control  -OOB/IS  -Continue home medications  -DASH diet  -Abx per ID  -Remainder of care per SICU, expect downgrade today

## 2023-10-02 NOTE — PROGRESS NOTE ADULT - ASSESSMENT
Assessment: Patient is a medically complex 62 y/o M who presented with intractable nausea and vomiting; MUSTAPHA revealed vegetation on prosthetic mitral valve, consistent with prosthetic endocarditis. Culture of vegetation revealed Streptococcus epidermidis. Patient is now POD 1 from AV graft removal in setting of infected AV graft. Patient complains of LUE pain at this time.    Plan:  - Pain control plan --> increased hydromorphone from 0.25 mg IVP prn to 1 mg IVP q4h prn for severe pain (7-10 rating)  - Continue cefazolin 2g IVP q8h in setting of endocarditis as per ID recommendation  - q2hr neurovascular checks  - Continue to hold DVT prophylaxis and anticoagulation  - Do not remove packing at this time  - Continue home medications   - DASH diet  - Encourage ambulation and incentive spirometry  - Patient to remain on stepdown in setting of increasing leukocytosis Assessment: Patient is a medically complex 64 y/o M who presented with intractable nausea and vomiting; MUSTAPHA revealed vegetation on prosthetic mitral valve, consistent with prosthetic endocarditis. Culture of vegetation revealed Streptococcus epidermidis. Patient is now POD 1 from AV graft removal in setting of infected AV graft. Patient complains of LUE pain at this time.    Plan:  - Pain control plan --> increased hydromorphone from 0.25 mg IVP prn to 1 mg IVP q4h prn for severe pain (7-10 rating)  - Continue cefazolin 2g IVP q8h in setting of endocarditis as per ID recommendation  - q2hr neurovascular checks  - Continue to hold DVT prophylaxis and anticoagulation  - Do not remove packing at this time  - Continue home medications   - DASH diet  - Encourage ambulation and incentive spirometry  - Patient to remain on stepdown in setting of increasing leukocytosis      --- patient seen with student, changes made to the above note where applicable

## 2023-10-02 NOTE — PROGRESS NOTE ADULT - PROBLEM SELECTOR PROBLEM 1
Bacterial endocarditis
Bacterial endocarditis
S/P aortic valve and mitral valve replacement
Bacterial endocarditis
S/P aortic valve and mitral valve replacement
S/P aortic valve and mitral valve replacement

## 2023-10-02 NOTE — PROGRESS NOTE ADULT - ASSESSMENT
Assessment: Patient is a medically complex 64 y/o male here w/ bacteremia in the setting of endocarditis secondary to an infected AF loop graft. Now s/p graft excision, washout and brachial artery patch repair.    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

## 2023-10-02 NOTE — PROGRESS NOTE ADULT - SUBJECTIVE AND OBJECTIVE BOX
NEPHROLOGY INTERVAL HPI/OVERNIGHT EVENTS:    Examined earlier  No distress     MEDICATIONS  (STANDING):  amLODIPine   Tablet 10 milliGRAM(s) Oral daily  atorvastatin 40 milliGRAM(s) Oral at bedtime  carvedilol 6.25 milliGRAM(s) Oral every 12 hours  ceFAZolin  Injectable. 2000 milliGRAM(s) IV Push every 8 hours  heparin   Injectable 5000 Unit(s) SubCutaneous every 8 hours  levETIRAcetam 1000 milliGRAM(s) Oral two times a day  polyethylene glycol 3350 17 Gram(s) Oral daily  senna 2 Tablet(s) Oral at bedtime  sodium bicarbonate 650 milliGRAM(s) Oral every 8 hours  sodium bicarbonate  Infusion 0.081 mEq/kG/Hr (83 mL/Hr) IV Continuous <Continuous>  tamsulosin 0.4 milliGRAM(s) Oral at bedtime    MEDICATIONS  (PRN):  acetaminophen     Tablet .. 650 milliGRAM(s) Oral every 6 hours PRN Temp greater or equal to 38C (100.4F), Mild Pain (1 - 3)  aluminum hydroxide/magnesium hydroxide/simethicone Suspension 30 milliLiter(s) Oral every 4 hours PRN Dyspepsia  HYDROmorphone  Injectable 1 milliGRAM(s) IV Push every 4 hours PRN breakthru pain  or prior o dressing changes  melatonin 3 milliGRAM(s) Oral at bedtime PRN Insomnia  ondansetron Injectable 4 milliGRAM(s) IV Push every 8 hours PRN Nausea and/or Vomiting  oxyCODONE    IR 5 milliGRAM(s) Oral every 4 hours PRN Moderate Pain (4 - 6)  oxyCODONE    IR 10 milliGRAM(s) Oral every 4 hours PRN Severe Pain (7 - 10)      Allergies    penicillin (Other)    Intolerances        Vital Signs Last 24 Hrs  T(C): 36.4 (02 Oct 2023 13:02), Max: 36.7 (01 Oct 2023 17:30)  T(F): 97.6 (02 Oct 2023 13:02), Max: 98 (01 Oct 2023 17:30)  HR: 60 (02 Oct 2023 12:00) (50 - 68)  BP: 128/74 (02 Oct 2023 12:00) (100/58 - 149/65)  BP(mean): 81 (02 Oct 2023 06:00) (71 - 88)  RR: 18 (02 Oct 2023 12:00) (12 - 18)  SpO2: 94% (02 Oct 2023 12:00) (94% - 100%)    Parameters below as of 02 Oct 2023 12:00  Patient On (Oxygen Delivery Method): room air      Daily     Daily     PHYSICAL EXAM:  GENERAL: NAD  EYES: EOMI  NECK: Supple  NERVOUS SYSTEM:  A/O x3  CHEST:  No rales   HEART:  RRR, No murmur  ABDOMEN: Soft, NT/ND BS+  EXTREMITIES:  No Edema    LABS:                        10.2   11.18 )-----------( 149      ( 02 Oct 2023 05:19 )             31.5     10-02    137  |  101  |  46.0<H>  ----------------------------<  136<H>  3.9   |  23.0  |  1.64<H>    Ca    8.0<L>      02 Oct 2023 05:19  Phos  3.1     10-02  Mg     2.0     10-02    TPro  6.1<L>  /  Alb  2.1<L>  /  TBili  0.3<L>  /  DBili  x   /  AST  32  /  ALT  <5  /  AlkPhos  143<H>  10-02    PT/INR - ( 01 Oct 2023 08:30 )   PT: 13.6 sec;   INR: 1.23 ratio         PTT - ( 01 Oct 2023 08:30 )  PTT:30.1 sec  Urinalysis Basic - ( 02 Oct 2023 05:19 )    Color: x / Appearance: x / SG: x / pH: x  Gluc: 136 mg/dL / Ketone: x  / Bili: x / Urobili: x   Blood: x / Protein: x / Nitrite: x   Leuk Esterase: x / RBC: x / WBC x   Sq Epi: x / Non Sq Epi: x / Bacteria: x      Magnesium: 2.0 mg/dL (10-02 @ 05:19)  Phosphorus: 3.1 mg/dL (10-02 @ 05:19)          RADIOLOGY & ADDITIONAL TESTS:

## 2023-10-02 NOTE — PROGRESS NOTE ADULT - ASSESSMENT
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 Patient presented to [facility] for 2 weeks of inability to tolerate oral intake. Denies any fever or chills. Has chronic diarrhea due to bowel resection. In the ER patient was afebrile. Found to have leukocytosis to 23k. Patient was found to have SANTOS. Had a febrile episode to 102F on 9/25. Started on Vancomycin and Meropenem. Blood cultures 1 of 4 bottles with Staphylococcus epidermidis      Prosthetic MV endocarditis   Staphylococcus epidermidis bacteremia  Infected thrombosed Graft   s/p Left AV graft removal 10/1/23  Fever  Leukocytosis   SANTOS   PCN allergy       - Blood cultures 9/23, 9/25 reporting Staphylococcus epidermidis  - Repeat blood cultures  9/27 no growth   - RVP/COVID 19 PCR 9/25 negative   - CT A/P 9/23 reporting no acute findings   - CXR 9/23 no PNA  - TTE reporting MV veg  - MUSTAPHA reporting large mobile density MV  - UA 9/25 negative for UTI   - Procalcitonin level not reliable in SANTOS   - PCN allergy but has tolerated Zosyn and Zinacef in the past.   - Vascular surgery following for thrombosed graft. s/p Left AV graft removal 10/1/23  - Continue Cefazolin   - Due to renal failure will hold off on Gentamicin  - Due to Eliquis unable to use Rifampin   - Plan for Repeat MUSTAPHA on or after 10/24/23  - Follow up cultures  - Trend Fever  - Trend WBC      Will Follow    Discussed treatment plan with: CT Surgery

## 2023-10-02 NOTE — PROGRESS NOTE ADULT - CRITICAL CARE ATTENDING COMMENT
To OR yesterday            Infected LUE AV graft  Endocarditis    -Pain -oxycodone added  -Keppra for prior seizure disorder  -HD stable, on home antihypertensives, occasional bradycardia  -Breathing comfortably on RA  -H/H adequate  -SCDs, will start sqh for chemical prophylaxis  -Kefzol in place for bacteremia w/staph epidemidis  -Plan for four weeks of antibiotics and then plan to re-evaluate valve - CT surgery to determine at that time if patient will need a valvular replacement  -Cr improved and making adequate urine  -Lytes adequate, remains on a bicarb drip -as well as oral bicarb, per renal thought to be related to CKD - will d/w neprhology  -PIV    -OK now for q shift neurovascular checks - will transfer from step down setting as currently w/o acute care issues To OR yesterday.   Reports discomfort at incisions and intermittent mild tingling at fingers.    GEN: Awake, alert, conversant  RUE:  Moves fingers well with good strength, fingers warm, incisions clean and dry - mild erythema at proximal sutures, two wounds packed w/gauze, no bleeding noted    Infected LUE AV graft  Endocarditis    -Pain -oxycodone added  -Keppra for prior seizure disorder  -HD stable, on home antihypertensives, occasional bradycardia  -Breathing comfortably on RA  -H/H adequate  -SCDs, will start sqh for chemical prophylaxis  -Kefzol in place for bacteremia w/staph epidermidis  -Plan for four weeks of antibiotics and then plan to re-evaluate valve - CT surgery to determine at that time if patient will need a valvular replacement  -Cr improved and making adequate urine  -Lytes adequate, remains on a bicarb drip -as well as oral bicarb, per renal thought to be related to CKD - will d/w neprhology  -PIV    No longer requires q1hr neurovascular checks - will transfer from step down setting as currently w/o acute care issues

## 2023-10-02 NOTE — PROGRESS NOTE ADULT - PROBLEM SELECTOR PLAN 1
now with MV endocarditis in setting of staph epi bacteremia   ID following  MUSTAPHA 9/27 with 1.8x1 cm MV vegetation  CKD with prior HD (last 2022), LUE AV graft concerning for infection, no s/p finstula excision  if cultures remain negative, plan to 4 weeks of IV antibiotics and repeat MUSTAPHA at that time to see if vegetation improving as pt is high risk for surgery   CT surgery to follow  d/w Dr. Butler

## 2023-10-02 NOTE — PROGRESS NOTE ADULT - ASSESSMENT
Improving  SANTOS on CKD stage III serum cr overall improving- back to baseline   - Baseline 1.6 ---> h/o temporary HD ending in Nov 2021 --> was on HD x 1 year   No hydronephrosis on CT 9/23   Vanco level was 28 on 9/28-  likely contributing to rise in serum Cr     Sepsis with Staph epidermidis bacteremia and prosthetic MV endocarditis  Thrombosed L UE AVG likely source  Went to OR w Vascular 10/1--> Excision of L graft  ID eval appreciated, PCN allery--> on Cefazolin    MA - likely 2/2 CKD - s/p coarse of IVF w added bicarb  Mild hyponatremia - better      Renally dose meds  Avoid nephrotoxic agents  AM labs will follow

## 2023-10-02 NOTE — PROGRESS NOTE ADULT - SUBJECTIVE AND OBJECTIVE BOX
Patient seen and examined at bedside. Doing well post op with intact NV exam. Reports stable LUE pain. Otherwise no complaints.    MEDICATIONS  (STANDING):  amLODIPine   Tablet 10 milliGRAM(s) Oral daily  atorvastatin 40 milliGRAM(s) Oral at bedtime  carvedilol 6.25 milliGRAM(s) Oral every 12 hours  ceFAZolin  Injectable. 2000 milliGRAM(s) IV Push every 8 hours  levETIRAcetam 1000 milliGRAM(s) Oral two times a day  sodium bicarbonate 650 milliGRAM(s) Oral every 8 hours  sodium bicarbonate  Infusion 0.081 mEq/kG/Hr (83 mL/Hr) IV Continuous <Continuous>  tamsulosin 0.4 milliGRAM(s) Oral at bedtime    MEDICATIONS  (PRN):  acetaminophen     Tablet .. 650 milliGRAM(s) Oral every 6 hours PRN Temp greater or equal to 38C (100.4F), Mild Pain (1 - 3)  aluminum hydroxide/magnesium hydroxide/simethicone Suspension 30 milliLiter(s) Oral every 4 hours PRN Dyspepsia  melatonin 3 milliGRAM(s) Oral at bedtime PRN Insomnia  ondansetron Injectable 4 milliGRAM(s) IV Push every 8 hours PRN Nausea and/or Vomiting  oxyCODONE    IR 5 milliGRAM(s) Oral every 6 hours PRN Moderate Pain (4 - 6)  oxyCODONE    IR 10 milliGRAM(s) Oral every 6 hours PRN Severe Pain (7 - 10)      Vital Signs Last 24 Hrs  T(C): 36.7 (01 Oct 2023 19:25), Max: 36.7 (01 Oct 2023 17:30)  T(F): 98 (01 Oct 2023 19:25), Max: 98 (01 Oct 2023 17:30)  HR: 58 (01 Oct 2023 22:29) (50 - 68)  BP: 114/61 (01 Oct 2023 22:29) (100/58 - 143/62)  BP(mean): 78 (01 Oct 2023 22:29) (71 - 82)  RR: 18 (01 Oct 2023 22:29) (12 - 18)  SpO2: 100% (01 Oct 2023 22:29) (91% - 100%)    Parameters below as of 01 Oct 2023 22:29  Patient On (Oxygen Delivery Method): room air                            11.1   20.84 )-----------( 157      ( 01 Oct 2023 05:20 )             35.1   10-01    135  |  102  |  43.7<H>  ----------------------------<  85  3.8   |  18.0<L>  |  1.93<H>    Ca    8.2<L>      01 Oct 2023 05:20  Phos  3.4     10-01  Mg     1.9     10-01    TPro  6.3<L>  /  Alb  2.2<L>  /  TBili  0.4  /  DBili  x   /  AST  59<H>  /  ALT  15  /  AlkPhos  174<H>  10-01        Exam:  Gen: pt lying in bed, alert, in NAD  Resp: unlabored  CVS: RR but bradycardic   Abd: soft, NT, ND  Ext: moving all extremities spontaneously, sensation intact,, palpable ulnar pulse. Remainder of arm is bandaged. Neurovascularly intact.

## 2023-10-02 NOTE — PROGRESS NOTE ADULT - SUBJECTIVE AND OBJECTIVE BOX
INTERVAL HPI/OVERNIGHT EVENTS: Patient examined and interviewed at bedside this AM. Patient notes of LUE pain. No acute events overnight; patient taken to OR on 10/1/23 for AV graft removal in setting of infected graft. Denies new-onset fever, chills, nausea, vomiting, shortness of breath, chest pain, abdominal pain, dysuria, limb paresthesia, or any other concerning symptoms.    STATUS POST:      POST OPERATIVE DAY #: 1    MEDICATIONS  (STANDING):  amLODIPine   Tablet 10 milliGRAM(s) Oral daily  atorvastatin 40 milliGRAM(s) Oral at bedtime  carvedilol 6.25 milliGRAM(s) Oral every 12 hours  ceFAZolin  Injectable. 2000 milliGRAM(s) IV Push every 8 hours  levETIRAcetam 1000 milliGRAM(s) Oral two times a day  sodium bicarbonate 650 milliGRAM(s) Oral every 8 hours  sodium bicarbonate  Infusion 0.081 mEq/kG/Hr (83 mL/Hr) IV Continuous <Continuous>  tamsulosin 0.4 milliGRAM(s) Oral at bedtime    MEDICATIONS  (PRN):  acetaminophen     Tablet .. 650 milliGRAM(s) Oral every 6 hours PRN Temp greater or equal to 38C (100.4F), Mild Pain (1 - 3)  aluminum hydroxide/magnesium hydroxide/simethicone Suspension 30 milliLiter(s) Oral every 4 hours PRN Dyspepsia  HYDROmorphone  Injectable 1 milliGRAM(s) IV Push every 4 hours PRN breakthru pain  or prior o dressing changes  melatonin 3 milliGRAM(s) Oral at bedtime PRN Insomnia  ondansetron Injectable 4 milliGRAM(s) IV Push every 8 hours PRN Nausea and/or Vomiting  oxyCODONE    IR 10 milliGRAM(s) Oral every 4 hours PRN Severe Pain (7 - 10)  oxyCODONE    IR 5 milliGRAM(s) Oral every 4 hours PRN Moderate Pain (4 - 6)      Vital Signs Last 24 Hrs  T(C): 36.4 (02 Oct 2023 04:00), Max: 36.7 (01 Oct 2023 17:30)  T(F): 97.6 (02 Oct 2023 04:00), Max: 98 (01 Oct 2023 17:30)  HR: 60 (02 Oct 2023 06:00) (50 - 68)  BP: 133/61 (02 Oct 2023 06:00) (100/58 - 149/65)  BP(mean): 81 (02 Oct 2023 06:00) (71 - 88)  RR: 18 (02 Oct 2023 06:00) (12 - 18)  SpO2: 97% (02 Oct 2023 06:00) (91% - 100%)    Parameters below as of 02 Oct 2023 06:00  Patient On (Oxygen Delivery Method): room air      PHYSICAL EXAM:  Constitutional: A&O x 3, patient seated comfortably in bed. No acute distress. WN/WD, appears stated age.    Respiratory: Patient breathing non-labored on room air. No accessory muscle use. No acute distress.    Cardiovascular: RRR via cardiac monitoring.    Extremities: LUE bandage in place. Noted LUE pain at incision sites on movement. Moving all limbs spontaneously.    Vascular: Neurovascularly intact in LUE. Palpable ulnar pulse, sensation intact.    Skin: Incision sites c/d/i. No rashes or erythema noted.      I&O's Detail    01 Oct 2023 07:01  -  02 Oct 2023 06:58  --------------------------------------------------------  IN:    Oral Fluid: 600 mL    Sodium Bicarbonate: 996 mL  Total IN: 1596 mL    OUT:  Total OUT: 0 mL    Total NET: 1596 mL      LABS:                        10.2   11.18 )-----------( 149      ( 02 Oct 2023 05:19 )             31.5     10-02    137  |  101  |  46.0<H>  ----------------------------<  136<H>  3.9   |  23.0  |  1.64<H>    Ca    8.0<L>      02 Oct 2023 05:19  Phos  3.1     10-02  Mg     2.0     10-02    TPro  6.1<L>  /  Alb  2.1<L>  /  TBili  0.3<L>  /  DBili  x   /  AST  32  /  ALT  <5  /  AlkPhos  143<H>  10-02    PT/INR - ( 01 Oct 2023 08:30 )   PT: 13.6 sec;   INR: 1.23 ratio         PTT - ( 01 Oct 2023 08:30 )  PTT:30.1 sec  Urinalysis Basic - ( 02 Oct 2023 05:19 )    Color: x / Appearance: x / SG: x / pH: x  Gluc: 136 mg/dL / Ketone: x  / Bili: x / Urobili: x   Blood: x / Protein: x / Nitrite: x   Leuk Esterase: x / RBC: x / WBC x   Sq Epi: x / Non Sq Epi: x / Bacteria: x

## 2023-10-02 NOTE — PHYSICAL THERAPY INITIAL EVALUATION ADULT - PREDICTED DURATION OF THERAPY (DAYS/WKS), PT EVAL
Chart reviewed and Pt discussed with interdisciplinary team.   Pt remains in ICU with SAH, Pt with hx of lupus anticoagulant, on chronic warfarin. Pt remains on IV cardene.   CM notes Pt doing well with therapy, no ongoing therapy needs at this time.     CM met with Pt, at bedside, to introduce self and role in dc planning.  Pt confirms she has valid POA-HC at home, reminder placed on AVS.        1 week

## 2023-10-02 NOTE — PROGRESS NOTE ADULT - ASSESSMENT
63M, PMH of HTN, HLD, known to cardiac surgery for Type A dissection complicated by bowel ischemia requiring bowel resection / repeat sternotomy, CABG x 2, AVR, MVR 11/2020 with Dr. Butler, CHF (mixed systolic / diastolic), CKD (with short term on HD 2022), A.fib s/p ablation pending watchman with Dr Trevino, SDH (resolved as of July imaging). Patient presented to [facility] for 2 weeks of inability to tolerate oral intake. Denies any fever or chills. Has chronic diarrhea due to bowel resection. In the ER patient was afebrile but found to have leukocytosis to 23k. Patient was found to have SANTOS. Had a febrile episode to 102F on 9/25. Started on Vancomycin and Meropenem. Blood cultures 2 of 4 bottles with Gram positive cocci (staph epi) TTE 9/26 revealed large mobile lesion on bioprosthetic MVR, likely suggestive of vegetation. CT surgery consulted for evaluation of bioprosthetic MVR lesion, now s/p MUSTAPHA with 1.8x1 cm MV vegetation; s/p AV graft removal for infection source control

## 2023-10-02 NOTE — PROGRESS NOTE ADULT - SUBJECTIVE AND OBJECTIVE BOX
Subjective - patient seen and evaluated bedside. Sitting comfortably in bed. Admits to pain to left arm surgical site. Denies CP, SOB, HA, dizziness, n/v/d    Review of Systems: negative x 10 systems except as noted above    Brief summary:  63yMale with prior Aortic dissection repair, s/p repeat sternotomy and cabgx2, avr/mvr, now with concern for bioprosrthetic MV endocarditis.     Significant/[id number] events:  now POD1 LT arm AV graft removal for infection source control    PAST MEDICAL & SURGICAL HISTORY:  CHF (congestive heart failure)      CVA (cerebral vascular accident)      Seizures  last seizure 10 years ago      HTN (hypertension)      Hyperlipemia      COVID-19  october 2020      Atrial fibrillation      Former smoker      History of cocaine use  remote hx, currently sober      H/O aortic dissection  s/p repair (2010), surgery complicated by bowel ischemia s/p bowel resection and ostomy with reversal      H/O aortic valve insufficiency      Mitral regurgitation      GIB (gastrointestinal bleeding)      CAD (coronary artery disease)  s/p PCI 1998  and CABG x 2 11/2020      Chronic atrial fibrillation      H/O colectomy      Status post double vessel coronary artery bypass  11/2020 Dr Butler      S/P AVR (aortic valve replacement)  (t)AVR 11/2020 Dr Butler      S/P MVR (mitral valve replacement)  (t)MVR 11/2020 Dr Butler      H/O aortic dissection  Type A; emergent repair 2010      AV fistula  LUE            acetaminophen     Tablet .. 650 milliGRAM(s) Oral every 6 hours PRN  aluminum hydroxide/magnesium hydroxide/simethicone Suspension 30 milliLiter(s) Oral every 4 hours PRN  amLODIPine   Tablet 10 milliGRAM(s) Oral daily  atorvastatin 40 milliGRAM(s) Oral at bedtime  carvedilol 6.25 milliGRAM(s) Oral every 12 hours  ceFAZolin  Injectable. 2000 milliGRAM(s) IV Push every 8 hours  HYDROmorphone  Injectable 1 milliGRAM(s) IV Push every 4 hours PRN  levETIRAcetam 1000 milliGRAM(s) Oral two times a day  melatonin 3 milliGRAM(s) Oral at bedtime PRN  ondansetron Injectable 4 milliGRAM(s) IV Push every 8 hours PRN  oxyCODONE    IR 5 milliGRAM(s) Oral every 4 hours PRN  oxyCODONE    IR 10 milliGRAM(s) Oral every 4 hours PRN  sodium bicarbonate 650 milliGRAM(s) Oral every 8 hours  sodium bicarbonate  Infusion 0.081 mEq/kG/Hr IV Continuous <Continuous>  tamsulosin 0.4 milliGRAM(s) Oral at bedtime  MEDICATIONS  (PRN):  acetaminophen     Tablet .. 650 milliGRAM(s) Oral every 6 hours PRN Temp greater or equal to 38C (100.4F), Mild Pain (1 - 3)  aluminum hydroxide/magnesium hydroxide/simethicone Suspension 30 milliLiter(s) Oral every 4 hours PRN Dyspepsia  HYDROmorphone  Injectable 1 milliGRAM(s) IV Push every 4 hours PRN breakthru pain  or prior o dressing changes  melatonin 3 milliGRAM(s) Oral at bedtime PRN Insomnia  ondansetron Injectable 4 milliGRAM(s) IV Push every 8 hours PRN Nausea and/or Vomiting  oxyCODONE    IR 5 milliGRAM(s) Oral every 4 hours PRN Moderate Pain (4 - 6)  oxyCODONE    IR 10 milliGRAM(s) Oral every 4 hours PRN Severe Pain (7 - 10)      Daily     Daily                               10.2   11.18 )-----------( 149      ( 02 Oct 2023 05:19 )             31.5   10-02    137  |  101  |  46.0<H>  ----------------------------<  136<H>  3.9   |  23.0  |  1.64<H>    Ca    8.0<L>      02 Oct 2023 05:19  Phos  3.1     10-02  Mg     2.0     10-02    TPro  6.1<L>  /  Alb  2.1<L>  /  TBili  0.3<L>  /  DBili  x   /  AST  32  /  ALT  <5  /  AlkPhos  143<H>  10-02      PT/INR - ( 01 Oct 2023 08:30 )   PT: 13.6 sec;   INR: 1.23 ratio         PTT - ( 01 Oct 2023 08:30 )  PTT:30.1 sec      Objective:  T(C): 36.4 (10-02-23 @ 07:26), Max: 36.7 (10-01-23 @ 17:30)  HR: 60 (10-02-23 @ 06:00) (50 - 68)  BP: 133/61 (10-02-23 @ 06:00) (100/58 - 149/65)  RR: 18 (10-02-23 @ 06:00) (12 - 18)  SpO2: 97% (10-02-23 @ 06:00) (95% - 100%)  Wt(kg): --CAPILLARY BLOOD GLUCOSE      I&O's Summary    01 Oct 2023 07:01  -  02 Oct 2023 07:00  --------------------------------------------------------  IN: 1596 mL / OUT: 0 mL / NET: 1596 mL        Physical Exam  General: NAD  Neuro: A+O x 3, non-focal, speech clear and intact  Psych: Appropriate affect  HEENT:  NCAT, No conjuctival edema or icterus, no thrush.  Pulm: CTA, equal bilaterally  CV: RRR, irregularly irregular, +S1S2  Abd: soft, NT, ND, +BS  Ext: +DP Pulses b/l, no edema  Skin: Warm, dry, intact  Inc: MSI C/D/I/stable w/ dressing, LE vein harvest site C/D/I with Ace wrap  Chest tubes:        Imaging:    < from: CT Head No Cont (09.28.23 @ 11:12) >  IMPRESSION:  No acute intracranial hemorrhage.    < end of copied text >  < from: CT Maxillofacial No Cont (09.28.23 @ 11:12) >  All of the maxillary dentition are missing.    The right second premolar, right first premolar, bilateral medial and   lateral incisors, left canine, left first premolar are present. Remaining   mandibular dentition are missing. No odontogenic disease visualized.    There is no evidence of acute fracture to the facial bones. The mandible   is intact and the mandibular condyles are well located. The ocular globes   and orbital walls are intact. The paranasal sinuses are well developed   and well-aerated. Mastoid air cells are clear. There is no appreciable   soft tissue abnormality.    IMPRESSION: Odontogenic disease.    < end of copied text >  < from: US Duplex Hemodialysis Access (09.25.23 @ 22:05) >  IMPRESSION: Thrombosis of left upper extremity AV graft.    < end of copied text >  < from: MUSTAPHA Echo Doppler (09.27.23 @ 10:59) >      Summary:   1. Left ventricular ejection fraction, by visual estimation, is 55 to   60%.   2. Severely enlarged left atrium.   3. Mild mitral valve regurgitation.   4. Mitral prosthesis vegetation.   5. There is a bioprosthetic valve in the mitral position with thickened   leaflets yet leaflet mobility is fairly preserved. There is trace to mild   valvular regurgitation with no perivalvular leaks. No rocking motion or   dehiscence noted. There is a large and mobile vegetation measuring   1.8x1.0 cm attached to the atrial surface of the medial bioprosthetic   leaflet. There is evidence of at least mild valve stenosis with a mean   transvalvular gradient of 5 mmHg at a heart rate of 60 bpm. The mitral   valve area by 3D is 1.57 cm2.   6. Mild tricuspid regurgitation.   7. Bioprosthesis inthe aortic position.   8. Dilatation of the aortic root.    Ronaldo, Electronically signed on 9/27/2    < end of copied text >

## 2023-10-02 NOTE — PROGRESS NOTE ADULT - SUBJECTIVE AND OBJECTIVE BOX
HISTORY: HPI:  Mr. Cummins is a 62 yo M with a PMH significant for HTN, HLD, Type A dissection / repeat sternotomy, and CABG x2, CHF (mixed systolic / diastolic), mitral / aortic valve replacement, CKD (with short term on HD 2022), A.fib s/p ablation pending watchman, SDH (resolved as of July imaging) who presents to [facility] for ~ 2 weeks of inability to tolerate oral intake. Patient states that over the last 2 weeks he has progressively lost the ability to stomach foods, beginning with solids and now also involving liquids when he takes in too much. He states he has been to 2 car shows within the last 2 weeks (both on Sundays) and noticed the symptoms begin the day after the first car show on 9/11. 10-15 minutes after intaking food or large amounts of liquids he will feel the need to vomit and after vomiting he will feel better. The emesis is NBNB only containing what he ate. Due to this he has been unable to tolerate his usual medicines including his blood thinner. He continues to have bowel movements and pass gas, and feels well if he doesn't try to ingest anything. His only remaining symptom was mild SOB on exertion last night and this morning. He denies fevers/chills, dysuria/ anuria, constipation / diarrhea, recent sick contacts or changes to medications. In ED patients vitals were wnl. Lab work showed hyponatremia of 127, with hypochloremia of 91, bicarb of 14, BUN 71.4, and Cr 3.5 as well as elevated WBC to 23.94 and elevated Trp to .09 with BNP 4805. Imaging with CT Abd/pelvis showed no acute pathology. Given 1L LR in ED, to be continued with NS at 50 cc'hr given likely dehydrated status, Nephrology and Cardiology consulted in ED. Patient to be admitted to medical floors for further monitoring.  (23 Sep 2023 17:52)    SICU consulted POD 0 Left AV graft removal- graft was removed and venotomy repaired. Brachial artery repaired with bovine pericardial patch for Q2he vasc checks.  EBL 75cc, 700cc IVF given.        24 HOUR EVENTS: Pain in left arm    Pt seen and examined at bedside. Denies chest pain, SOB, cough, abd pain, N/V, fevers, chills. Admits to continued severe pain in his left arm.    ROS: Negative unless per HPI    NEURO  Exam: A+Ox3, no focal deficits  Meds: acetaminophen     Tablet .. 650 milliGRAM(s) Oral every 6 hours PRN Temp greater or equal to 38C (100.4F), Mild Pain (1 - 3)  HYDROmorphone  Injectable 1 milliGRAM(s) IV Push every 4 hours PRN breakthru pain  or prior o dressing changes  levETIRAcetam 1000 milliGRAM(s) Oral two times a day  melatonin 3 milliGRAM(s) Oral at bedtime PRN Insomnia  ondansetron Injectable 4 milliGRAM(s) IV Push every 8 hours PRN Nausea and/or Vomiting  oxyCODONE    IR 10 milliGRAM(s) Oral every 4 hours PRN Severe Pain (7 - 10)  oxyCODONE    IR 5 milliGRAM(s) Oral every 4 hours PRN Moderate Pain (4 - 6)      RESPIRATORY  RR: 18 (10-02-23 @ 06:00) (12 - 18)  SpO2: 97% (10-02-23 @ 06:00) (91% - 100%)  Wt(kg): --  Exam: Lungs clear to auscultation b/l, no wheezing/rhonchi/rales  Mechanical Ventilation:     Meds:     CARDIOVASCULAR  HR: 60 (10-02-23 @ 06:00) (50 - 68)  BP: 133/61 (10-02-23 @ 06:00) (100/58 - 149/65)  BP(mean): 81 (10-02-23 @ 06:00) (71 - 88)  ABP: --  ABP(mean): --  Wt(kg): --  CVP(cm H2O): --      Exam: S1,S2 audible, regular. + systolic murmur.  Cardiac Rhythm: sinus bradycardia  Perfusion     [x ]Adequate   [ ]Inadequate  Mentation   [x]Normal       [ ]Reduced  Extremities  [ x]Warm         [ ]Cool  Volume Status [ ]Hypervolemic [x ]Euvolemic [ ]Hypovolemic  Meds: amLODIPine   Tablet 10 milliGRAM(s) Oral daily  carvedilol 6.25 milliGRAM(s) Oral every 12 hours      GI/NUTRITION  Exam: Abd soft, non-tender, non-distended  Diet: DASH diet  Meds: aluminum hydroxide/magnesium hydroxide/simethicone Suspension 30 milliLiter(s) Oral every 4 hours PRN Dyspepsia      GENITOURINARY  I&O's Detail    10-01 @ 07:01  -  10-02 @ 07:00  --------------------------------------------------------  IN:    Oral Fluid: 600 mL    Sodium Bicarbonate: 996 mL  Total IN: 1596 mL    OUT:  Total OUT: 0 mL    Total NET: 1596 mL      Ext: moving all extremities spontaneously, sensation intact, palpable radial pulses b/l 2+ and palpable DPs. L arm bandage c/d/i. +tenderness to palpation of LUE from shoulder to wrist.    10-02    137  |  101  |  46.0<H>  ----------------------------<  136<H>  3.9   |  23.0  |  1.64<H>    Ca    8.0<L>      02 Oct 2023 05:19  Phos  3.1     10-02  Mg     2.0     10-02    TPro  6.1<L>  /  Alb  2.1<L>  /  TBili  0.3<L>  /  DBili  x   /  AST  32  /  ALT  <5  /  AlkPhos  143<H>  10-02    Meds: sodium bicarbonate 650 milliGRAM(s) Oral every 8 hours  sodium bicarbonate  Infusion 0.081 mEq/kG/Hr IV Continuous <Continuous>  tamsulosin 0.4 milliGRAM(s) Oral at bedtime      HEMATOLOGIC  Meds:                         10.2   11.18 )-----------( 149      ( 02 Oct 2023 05:19 )             31.5     PT/INR - ( 01 Oct 2023 08:30 )   PT: 13.6 sec;   INR: 1.23 ratio         PTT - ( 01 Oct 2023 08:30 )  PTT:30.1 sec    INFECTIOUS DISEASES  T(C): 36.4 (10-02-23 @ 07:26), Max: 36.7 (10-01-23 @ 17:30)  Wt(kg): --  WBC Count: 11.18 K/uL (10-02 @ 05:19)    Recent Cultures:  Specimen Source: .Blood Blood-Peripheral, 09-27 @ 14:47; Results   No growth at 4 days; Gram Stain: --; Organism: --  Specimen Source: .Blood Blood-Peripheral, 09-27 @ 14:42; Results   No growth at 4 days; Gram Stain: --; Organism: --    Meds: ceFAZolin  Injectable. 2000 milliGRAM(s) IV Push every 8 hours      ENDOCRINE  Capillary Blood Glucose    Meds: atorvastatin 40 milliGRAM(s) Oral at bedtime      ACCESS DEVICES:  [x ] Peripheral IV  [ ] Central Venous Line		[ ] R	[ ] L	[ ] IJ	[ ] Fem	[ ] SC	Placed:   [ ] Arterial Line			[ ] R	[ ] L	[ ] Fem	[ ] Rad	[ ] Ax	Placed:   [ ] PICC:					[ ] Mediport  [ ] Urinary Catheter, Date Placed:   [ ] Necessity of urinary, arterial, and venous catheters discussed    OTHER MEDICATIONS:      IMAGING:

## 2023-10-03 LAB
ANION GAP SERPL CALC-SCNC: 11 MMOL/L — SIGNIFICANT CHANGE UP (ref 5–17)
ANISOCYTOSIS BLD QL: SLIGHT — SIGNIFICANT CHANGE UP
BASOPHILS # BLD AUTO: 0 K/UL — SIGNIFICANT CHANGE UP (ref 0–0.2)
BASOPHILS NFR BLD AUTO: 0 % — SIGNIFICANT CHANGE UP (ref 0–2)
BUN SERPL-MCNC: 42 MG/DL — HIGH (ref 8–20)
CALCIUM SERPL-MCNC: 8.6 MG/DL — SIGNIFICANT CHANGE UP (ref 8.4–10.5)
CHLORIDE SERPL-SCNC: 100 MMOL/L — SIGNIFICANT CHANGE UP (ref 96–108)
CO2 SERPL-SCNC: 31 MMOL/L — HIGH (ref 22–29)
CREAT SERPL-MCNC: 1.59 MG/DL — HIGH (ref 0.5–1.3)
EGFR: 49 ML/MIN/1.73M2 — LOW
EOSINOPHIL # BLD AUTO: 0.23 K/UL — SIGNIFICANT CHANGE UP (ref 0–0.5)
EOSINOPHIL NFR BLD AUTO: 0.9 % — SIGNIFICANT CHANGE UP (ref 0–6)
GLUCOSE SERPL-MCNC: 138 MG/DL — HIGH (ref 70–99)
GRAM STN FLD: SIGNIFICANT CHANGE UP
HCT VFR BLD CALC: 35.8 % — LOW (ref 39–50)
HGB BLD-MCNC: 11.6 G/DL — LOW (ref 13–17)
LYMPHOCYTES # BLD AUTO: 0.23 K/UL — LOW (ref 1–3.3)
LYMPHOCYTES # BLD AUTO: 0.9 % — LOW (ref 13–44)
MAGNESIUM SERPL-MCNC: 2 MG/DL — SIGNIFICANT CHANGE UP (ref 1.6–2.6)
MANUAL SMEAR VERIFICATION: SIGNIFICANT CHANGE UP
MCHC RBC-ENTMCNC: 28.2 PG — SIGNIFICANT CHANGE UP (ref 27–34)
MCHC RBC-ENTMCNC: 32.4 GM/DL — SIGNIFICANT CHANGE UP (ref 32–36)
MCV RBC AUTO: 87.1 FL — SIGNIFICANT CHANGE UP (ref 80–100)
MONOCYTES # BLD AUTO: 1.3 K/UL — HIGH (ref 0–0.9)
MONOCYTES NFR BLD AUTO: 5.2 % — SIGNIFICANT CHANGE UP (ref 2–14)
NEUTROPHILS # BLD AUTO: 23.26 K/UL — HIGH (ref 1.8–7.4)
NEUTROPHILS NFR BLD AUTO: 93 % — HIGH (ref 43–77)
OVALOCYTES BLD QL SMEAR: SLIGHT — SIGNIFICANT CHANGE UP
PHOSPHATE SERPL-MCNC: 2.9 MG/DL — SIGNIFICANT CHANGE UP (ref 2.4–4.7)
PLAT MORPH BLD: NORMAL — SIGNIFICANT CHANGE UP
PLATELET # BLD AUTO: 172 K/UL — SIGNIFICANT CHANGE UP (ref 150–400)
POIKILOCYTOSIS BLD QL AUTO: SLIGHT — SIGNIFICANT CHANGE UP
POLYCHROMASIA BLD QL SMEAR: SLIGHT — SIGNIFICANT CHANGE UP
POTASSIUM SERPL-MCNC: 3.8 MMOL/L — SIGNIFICANT CHANGE UP (ref 3.5–5.3)
POTASSIUM SERPL-SCNC: 3.8 MMOL/L — SIGNIFICANT CHANGE UP (ref 3.5–5.3)
RBC # BLD: 4.11 M/UL — LOW (ref 4.2–5.8)
RBC # FLD: 13.5 % — SIGNIFICANT CHANGE UP (ref 10.3–14.5)
RBC BLD AUTO: ABNORMAL
SODIUM SERPL-SCNC: 142 MMOL/L — SIGNIFICANT CHANGE UP (ref 135–145)
WBC # BLD: 25.01 K/UL — HIGH (ref 3.8–10.5)
WBC # FLD AUTO: 25.01 K/UL — HIGH (ref 3.8–10.5)

## 2023-10-03 PROCEDURE — 74177 CT ABD & PELVIS W/CONTRAST: CPT | Mod: 26

## 2023-10-03 PROCEDURE — 99233 SBSQ HOSP IP/OBS HIGH 50: CPT

## 2023-10-03 PROCEDURE — 99232 SBSQ HOSP IP/OBS MODERATE 35: CPT

## 2023-10-03 PROCEDURE — 74018 RADEX ABDOMEN 1 VIEW: CPT | Mod: 26

## 2023-10-03 PROCEDURE — 71045 X-RAY EXAM CHEST 1 VIEW: CPT | Mod: 26,77

## 2023-10-03 PROCEDURE — 71045 X-RAY EXAM CHEST 1 VIEW: CPT | Mod: 26

## 2023-10-03 RX ORDER — ONDANSETRON 8 MG/1
4 TABLET, FILM COATED ORAL ONCE
Refills: 0 | Status: COMPLETED | OUTPATIENT
Start: 2023-10-03 | End: 2023-10-03

## 2023-10-03 RX ORDER — SODIUM CHLORIDE 9 MG/ML
1000 INJECTION INTRAMUSCULAR; INTRAVENOUS; SUBCUTANEOUS
Refills: 0 | Status: DISCONTINUED | OUTPATIENT
Start: 2023-10-03 | End: 2023-10-03

## 2023-10-03 RX ORDER — SODIUM CHLORIDE 9 MG/ML
1000 INJECTION INTRAMUSCULAR; INTRAVENOUS; SUBCUTANEOUS
Refills: 0 | Status: DISCONTINUED | OUTPATIENT
Start: 2023-10-03 | End: 2023-10-04

## 2023-10-03 RX ORDER — METOCLOPRAMIDE HCL 10 MG
10 TABLET ORAL ONCE
Refills: 0 | Status: COMPLETED | OUTPATIENT
Start: 2023-10-03 | End: 2023-10-03

## 2023-10-03 RX ORDER — POTASSIUM CHLORIDE 20 MEQ
10 PACKET (EA) ORAL
Refills: 0 | Status: COMPLETED | OUTPATIENT
Start: 2023-10-03 | End: 2023-10-03

## 2023-10-03 RX ADMIN — HEPARIN SODIUM 5000 UNIT(S): 5000 INJECTION INTRAVENOUS; SUBCUTANEOUS at 13:08

## 2023-10-03 RX ADMIN — ONDANSETRON 4 MILLIGRAM(S): 8 TABLET, FILM COATED ORAL at 13:22

## 2023-10-03 RX ADMIN — HEPARIN SODIUM 5000 UNIT(S): 5000 INJECTION INTRAVENOUS; SUBCUTANEOUS at 06:13

## 2023-10-03 RX ADMIN — ONDANSETRON 4 MILLIGRAM(S): 8 TABLET, FILM COATED ORAL at 00:21

## 2023-10-03 RX ADMIN — TAMSULOSIN HYDROCHLORIDE 0.4 MILLIGRAM(S): 0.4 CAPSULE ORAL at 21:07

## 2023-10-03 RX ADMIN — ATORVASTATIN CALCIUM 40 MILLIGRAM(S): 80 TABLET, FILM COATED ORAL at 21:07

## 2023-10-03 RX ADMIN — Medication 83 MEQ/KG/HR: at 00:18

## 2023-10-03 RX ADMIN — ONDANSETRON 4 MILLIGRAM(S): 8 TABLET, FILM COATED ORAL at 03:57

## 2023-10-03 RX ADMIN — HEPARIN SODIUM 5000 UNIT(S): 5000 INJECTION INTRAVENOUS; SUBCUTANEOUS at 21:07

## 2023-10-03 RX ADMIN — SENNA PLUS 2 TABLET(S): 8.6 TABLET ORAL at 21:07

## 2023-10-03 RX ADMIN — SODIUM CHLORIDE 65 MILLILITER(S): 9 INJECTION INTRAMUSCULAR; INTRAVENOUS; SUBCUTANEOUS at 17:35

## 2023-10-03 RX ADMIN — Medication 100 MILLIEQUIVALENT(S): at 13:07

## 2023-10-03 RX ADMIN — OXYCODONE HYDROCHLORIDE 10 MILLIGRAM(S): 5 TABLET ORAL at 01:17

## 2023-10-03 RX ADMIN — OXYCODONE HYDROCHLORIDE 10 MILLIGRAM(S): 5 TABLET ORAL at 00:17

## 2023-10-03 RX ADMIN — HYDROMORPHONE HYDROCHLORIDE 1 MILLIGRAM(S): 2 INJECTION INTRAMUSCULAR; INTRAVENOUS; SUBCUTANEOUS at 21:56

## 2023-10-03 RX ADMIN — HYDROMORPHONE HYDROCHLORIDE 1 MILLIGRAM(S): 2 INJECTION INTRAMUSCULAR; INTRAVENOUS; SUBCUTANEOUS at 20:56

## 2023-10-03 RX ADMIN — Medication 2000 MILLIGRAM(S): at 13:08

## 2023-10-03 RX ADMIN — Medication 2000 MILLIGRAM(S): at 03:57

## 2023-10-03 RX ADMIN — Medication 100 MILLIEQUIVALENT(S): at 17:35

## 2023-10-03 RX ADMIN — Medication 100 MILLIEQUIVALENT(S): at 15:22

## 2023-10-03 RX ADMIN — Medication 10 MILLIGRAM(S): at 04:52

## 2023-10-03 RX ADMIN — Medication 2000 MILLIGRAM(S): at 21:01

## 2023-10-03 RX ADMIN — SODIUM CHLORIDE 100 MILLILITER(S): 9 INJECTION INTRAMUSCULAR; INTRAVENOUS; SUBCUTANEOUS at 13:09

## 2023-10-03 NOTE — CHART NOTE - NSCHARTNOTEFT_GEN_A_CORE
Pt had CT abdomen this evening  Findings discussed with Radiologist  < from: CT Abdomen and Pelvis w/ Oral Cont and w/ IV Cont (10.03.23 @ 20:32) >    IMPRESSION:  Dilated fluid-filled small bowel loops, with gradual tapering to   relatively normal caliber distal small bowel loops. Differential   considerations include sequelae of early or partial small bowel   obstruction versus ileus.    The stomach is decompressed likely related to nasogastric tube placement.    Wedge-shaped area of hypoattenuation involvingthe left interpolar kidney   this appears new compared to previous exam, and is likely an infarct.   Focal pyonephritis may also be considered.    Suspected 1.8 cm heterogeneous lesion of the right upper pole kidney.   This is suspicious for renal cell carcinoma. Further correlation with pre   and postcontrast renal protocol MRI or CT is advised.    Chronic aortic dissection, similar compared to previous exam.    Question distal esophagitis.    Bibasilar nodular opacities of the lungs, suspiciousfor bibasilar   pneumonia, right greater than left. Given the distribution, aspiration   should be considered.    Trace right pleural effusion.      MRI or CT with pre and post renal protocol recommended  Discussed with pt  Pt states he can not tolerate an MRI and will not consent to one  CT abdomen ordered with pre and post contrast renal protocol  BMP in am to check creatinine prior to next CT with contrast since pt received contrast today  Dr Johnson aware Pt had CT abdomen this evening  Findings discussed with Radiologist  < from: CT Abdomen and Pelvis w/ Oral Cont and w/ IV Cont (10.03.23 @ 20:32) >    IMPRESSION:  Dilated fluid-filled small bowel loops, with gradual tapering to   relatively normal caliber distal small bowel loops. Differential   considerations include sequelae of early or partial small bowel   obstruction versus ileus.    The stomach is decompressed likely related to nasogastric tube placement.    Wedge-shaped area of hypoattenuation involvingthe left interpolar kidney   this appears new compared to previous exam, and is likely an infarct.   Focal pyonephritis may also be considered.    Suspected 1.8 cm heterogeneous lesion of the right upper pole kidney.   This is suspicious for renal cell carcinoma. Further correlation with pre   and postcontrast renal protocol MRI or CT is advised.    Chronic aortic dissection, similar compared to previous exam.    Question distal esophagitis.    Bibasilar nodular opacities of the lungs, suspiciousfor bibasilar   pneumonia, right greater than left. Given the distribution, aspiration   should be considered.    Trace right pleural effusion.    Pt has NGT to suction  MRI or CT with pre and post renal protocol recommended  Discussed with pt  Pt states he can not tolerate an MRI and will not consent to one  CT abdomen ordered with pre and post contrast renal protocol  BMP in am to check creatinine prior to next CT with contrast since pt received contrast today  Dr Johnson aware

## 2023-10-03 NOTE — PROGRESS NOTE ADULT - SUBJECTIVE AND OBJECTIVE BOX
NEPHROLOGY INTERVAL HPI/OVERNIGHT EVENTS:    Examined earlier  No distress   Denies HA CP no SOB    MEDICATIONS  (STANDING):  amLODIPine   Tablet 10 milliGRAM(s) Oral daily  atorvastatin 40 milliGRAM(s) Oral at bedtime  carvedilol 6.25 milliGRAM(s) Oral every 12 hours  ceFAZolin  Injectable. 2000 milliGRAM(s) IV Push every 8 hours  heparin   Injectable 5000 Unit(s) SubCutaneous every 8 hours  levETIRAcetam 1000 milliGRAM(s) Oral two times a day  polyethylene glycol 3350 17 Gram(s) Oral daily  potassium chloride  10 mEq/100 mL IVPB 10 milliEquivalent(s) IV Intermittent every 1 hour  senna 2 Tablet(s) Oral at bedtime  sodium chloride 0.9%. 1000 milliLiter(s) (65 mL/Hr) IV Continuous <Continuous>  tamsulosin 0.4 milliGRAM(s) Oral at bedtime    MEDICATIONS  (PRN):  acetaminophen     Tablet .. 650 milliGRAM(s) Oral every 6 hours PRN Temp greater or equal to 38C (100.4F), Mild Pain (1 - 3)  aluminum hydroxide/magnesium hydroxide/simethicone Suspension 30 milliLiter(s) Oral every 4 hours PRN Dyspepsia  HYDROmorphone  Injectable 1 milliGRAM(s) IV Push every 4 hours PRN breakthru pain  or prior o dressing changes  melatonin 3 milliGRAM(s) Oral at bedtime PRN Insomnia  ondansetron Injectable 4 milliGRAM(s) IV Push every 8 hours PRN Nausea and/or Vomiting  oxyCODONE    IR 5 milliGRAM(s) Oral every 4 hours PRN Moderate Pain (4 - 6)  oxyCODONE    IR 10 milliGRAM(s) Oral every 4 hours PRN Severe Pain (7 - 10)      Allergies    penicillin (Other)    Intolerances        Vital Signs Last 24 Hrs  T(C): 36.5 (03 Oct 2023 13:34), Max: 37 (03 Oct 2023 02:45)  T(F): 97.7 (03 Oct 2023 13:34), Max: 98.6 (03 Oct 2023 02:45)  HR: 79 (03 Oct 2023 13:34) (67 - 85)  BP: 165/75 (03 Oct 2023 13:34) (133/63 - 165/75)  BP(mean): --  RR: 18 (03 Oct 2023 13:34) (17 - 18)  SpO2: 91% (03 Oct 2023 13:34) (91% - 96%)    Parameters below as of 03 Oct 2023 13:34  Patient On (Oxygen Delivery Method): room air      PHYSICAL EXAM:  GENERAL: NAD  EYES: EOMI  NECK: Supple  NERVOUS SYSTEM:  A/O x3  CHEST:  No rales   HEART:  RRR, No murmur  ABDOMEN: Soft, NT/ND BS+  EXTREMITIES:  No Edema    LABS:                        11.6   25.01 )-----------( 172      ( 03 Oct 2023 09:52 )             35.8     10-03    142  |  100  |  42.0<H>  ----------------------------<  138<H>  3.8   |  31.0<H>  |  1.59<H>    Ca    8.6      03 Oct 2023 09:52  Phos  2.9     10-03  Mg     2.0     10-03    TPro  6.1<L>  /  Alb  2.1<L>  /  TBili  0.3<L>  /  DBili  x   /  AST  32  /  ALT  <5  /  AlkPhos  143<H>  10-02      Urinalysis Basic - ( 03 Oct 2023 09:52 )    Color: x / Appearance: x / SG: x / pH: x  Gluc: 138 mg/dL / Ketone: x  / Bili: x / Urobili: x   Blood: x / Protein: x / Nitrite: x   Leuk Esterase: x / RBC: x / WBC x   Sq Epi: x / Non Sq Epi: x / Bacteria: x      Magnesium: 2.0 mg/dL (10-03 @ 09:52)  Phosphorus: 2.9 mg/dL (10-03 @ 09:52)          RADIOLOGY & ADDITIONAL TESTS:

## 2023-10-03 NOTE — CONSULT NOTE ADULT - CONSULT REQUESTED DATE/TIME
01-Oct-2023 14:00
03-Oct-2023 08:00
24-Sep-2023
28-Sep-2023 12:18
24-Sep-2023 13:57
25-Sep-2023 12:44
26-Sep-2023 18:43
26-Sep-2023 12:37

## 2023-10-03 NOTE — CONSULT NOTE ADULT - CONSULT REASON
Positive blood culture
SANTOS
r/o infected HD access
Q2hr vascular checks
ileus vs. SBO
SANTOS on CKD
Bioprosthetic MV
bacteremia

## 2023-10-03 NOTE — PROGRESS NOTE ADULT - ASSESSMENT
Assessment: Patient is a 63 year-old male with PMH HTN, HLD, CHF, CKD, Type A aortic dissection, and atrial fibrillation s/p ablation; PSH sternotomy, CABG x 2, MVR, AVR, L AVG; who presented to the ED on 9/23/23 with intractable nausea and vomiting. 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 Patient is now POD 2 from removal of L AV graft in presence of infection and bacteremia. Patient reports nausea and vomiting that has been intractable overnight. No other concerns at this time    Plan:  - NGT placed and put to suction by Dr. Mandel, ordered STAT CXR for placement  - F/u with official read on STAT KUB, if indeterminate will order abdominal CT without contrast  - General surgery consult ordered for concern of SBO  - SQH q8h as DVT prophylaxis  - Continue cefazolin 2g IVP q8h as per infectious disease for endocarditis  - Patient made NPO in presence of NGT  - q4h neurovascular checks  - Encourage ambulation and incentive spirometry  - Please do not remove surgical packing Assessment: Patient is a 63 year-old male with PMH HTN, HLD, CHF, CKD, Type A aortic dissection, and atrial fibrillation s/p ablation; PSH sternotomy, CABG x 2, MVR, AVR, L AVG; who presented to the ED on 9/23/23 with intractable nausea and vomiting. 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 Patient is now POD 2 from removal of L AV graft in presence of infection and bacteremia. Patient reports nausea and vomiting that has been intractable overnight.     Plan:  - NGT placed and ordered STAT CXR for placement  - F/u with official read on STAT KUB  -CT IV/PO ordered  - General surgery consult ordered for concern of SBO  - SQH q8h as DVT prophylaxis  - Continue cefazolin 2g IVP q8h as per infectious disease for endocarditis  - Patient made NPO in presence of NGT  - q4h neurovascular checks  - Encourage ambulation and incentive spirometry

## 2023-10-03 NOTE — CONSULT NOTE ADULT - REASON FOR ADMISSION
Intractable N/V, SANTOS

## 2023-10-03 NOTE — PROGRESS NOTE ADULT - ASSESSMENT
64 y/o M w/ PMH of HTN, HLD, Type A dissection w/ bowel ischemia requiring bowel resection w/ repeat sternotomy, and CABG x2, CHF (mixed systolic/diastolic), MV and AV replacement, CKD III (with short term on HD 2022), AF s/p ablation pending watchman, SDH (resolved as of July imaging) who presented to [facility] for 2wks of inability to tolerate PO intake.  Labs consistent w/ SANTOS on CKD.  Hospital course complicated by bacteremia w/ cxs growing staph epidermidus and TTE showing large MV endocarditis. Vascular consulted with concern of Infected prosthetic vascular graft,  Patient is now s/p removal of L AV graft. Was monitor in SICU and then transfer to floor. Vascular requesting transfer back to medicine service. Patient hospital course now complicated by Ileus vs SBO. General surgery was consulted. NGT was placed and now pending CT a/p.     Sepsis POA 2/2 S epididermiditis bacteremia likely endovascular in origin w/ subsequent MV IE   - Repeat BCXs from 09/27 NGTD  - LUE AV graft concerning for infectious source of bacteremia leading to IE now s/p removal on 10/1 by vascular  - Has MV/AV replacements which increases Pts risk of IE in the setting of staph epi bacteremia    - s/p MUSTAPHA w/ evidence of large MV vegitation   - c/w cefazolin as per ID recs   - Due to renal failure will hold off on Gentamicin  - Due to Eliquis unable to use Rifampin   - Plan for Repeat MUSTAPHA on or after 10/24/23 per ID  - CT A/P redemonstrates aortic dissection stable from 05/2023 but no acute findings reported   - CTSx consulted for consideration of re-operation of MVR   - As per CTSx will hold off on surgery for now and on contrast studies given pt high surgical risk and risk of progressing to HD again   - If cxs remain negative can tx w/ 4wks of abxs repeat MUSTAPHA to see if vegetation improving as per CTSx  - Monitor CBC and temperature    Ileus vs SBO  - NGT to LCWS  - gen surg consulted  - pending CT a/p   - NPO    - gentle fluids for 12 hours given hx of CHF    SANTOS on CKD III likely prerenal azotemia 2/2 GI losses and poor po intake  - Cr improving since admission (baseline 1.5-1.8)  - Required short term HD in recent past   - will stop sodium bicarb as serum bicarb elevated  - Continue to hold of on diuretic given hypovolemia   - Avoid nephrotoxic meds or renally dose if needed   - Nephrology following and recs noted     Chronic HFpEF  - Follows Dr. Frankel   - c/w Coreg  - Torsemide held in light of dehydration on admission resume on d/c or sooner if needed  - MUSTAPHA w/ LVEF 55-60%  - Monitor daily weights and I/O's  - Cardio following and recs noted    paroxysmal Atrial fibrillation   - Rate controlled currently   - CHADSVASC of 5 and as per cardiology no need to bridge   - c/w BB for rate control  - Briefly held AC when had SDH but since resumed after 07/2023 due CTH w/ complete resolution of SDH  - AC currently on hold due to recent procedure and now in light of possible SBO will hold in case needs surgical intervention.   - will need to resume AC once SBO resolves    HTN  - c/w amlodipine and carvedilol     Troponemia   - Likely from poor clearance in the setting of SANTOS on CKD  - Plateaued w/ no EKG changes   - No chest pain     SDH   - CTH has shown complete resolution since July 2023    Mitral/Aortic valve replacement   - Recent MUSTAPHA shows bioprosthetic valves with only moderate paravalvular leak in MV  - Now w/ large vegetation on MV  - CTSx following but no plan for surg intervention at this time      VTE ppx: HSQ  Dispo: remains acute, transfer to medicine service.

## 2023-10-03 NOTE — CHART NOTE - NSCHARTNOTEFT_GEN_A_CORE
Patient has had n/v overnight with abdominal distension this morning. Hx of several abdominal surgeries. NGT placed for decompression and placement CXR ordered. Will follow-up.

## 2023-10-03 NOTE — CONSULT NOTE ADULT - ATTENDING COMMENTS
ACS consulted for possible ileus vs SBO. Nontoxic.  Pending CT scan.   NGT  Serial abdominal exams.   Trend vitals/labs/I&Os.
Patient admitted to SDU POD#0 s/p excision of infected AV Graft with repair of brachial vessels. Intact distal flow; no evidence of neuro deficits.   --Q2 NV checks.   --MMPR.   --Monitor vitals, labs, I&Os.

## 2023-10-03 NOTE — CONSULT NOTE ADULT - SUBJECTIVE AND OBJECTIVE BOX
HPI:  Mr. Cummins is a 62 yo M with a PMH significant for HTN, HLD, Type A dissection/repeat sternotomy, and CABG x2, CHF (mixed systolic / diastolic), mitral / aortic valve replacement, hx of small bowel resection, CKD (with short term HD 10/21 - 10/22), A.fib s/p ablation, presented to [facility] w/ approx 2 weeks of poor po intake due to abdominal pain. CT abd/pelvis on admission without evidence of acute intra-abdominal pathology. Patient was admitted to medicine for further workup and was found to have staph epi bacteremia and bacterial endocarditis. Patient was taken to the OR with vascular surgery on 10/1 for explanation of L AV fistula graft, and was transferred to the vascular surgery service post operatively. Overnight on 10/2 to 10/3 patient developed worsening abdominal pain, nausea and vomiting. on physical examination this AM the vascular service felt that patient's abdomen was distended. An NGT was placed and patient was ordered for a CT abd/pelvis. General surgery consulted for possible SBO vs. ileus.    PAST MEDICAL & SURGICAL HISTORY:  CHF (congestive heart failure)  CVA (cerebral vascular accident)  Seizures  last seizure 10 years ago  HTN (hypertension)  Hyperlipemia  COVID-19  october 2020  Atrial fibrillation  Former smoker  History of cocaine use  remote hx, currently sober  H/O aortic dissection  s/p repair (2010), surgery complicated by bowel ischemia s/p bowel resection and ostomy with reversal  H/O aortic valve insufficiency  Mitral regurgitation  GIB (gastrointestinal bleeding)  CAD (coronary artery disease)  s/p PCI 1998  and CABG x 2 11/2020  Chronic atrial fibrillation  H/O colectomy  Status post double vessel coronary artery bypass  11/2020 Dr Butler  S/P AVR (aortic valve replacement)  (t)AVR 11/2020 Dr Butler  S/P MVR (mitral valve replacement)  (t)MVR 11/2020 Dr Butler  H/O aortic dissection  Type A; emergent repair 2010  AV fistula  LUE    Home Medications:  atorvastatin 40 mg oral tablet: 1 orally once a day (14 Aug 2023 08:35)  carvedilol 6.25 mg oral tablet: 1 orally 2 times a day (14 Aug 2023 08:34)  Eliquis 5 mg oral tablet: 1 orally once a day (14 Aug 2023 08:33)  oyster shell 500mg BID:  (14 Aug 2023 08:35)  tamsulosin 0.4 mg oral capsule: 1 cap(s) orally once a day (at bedtime) (14 Aug 2023 08:32)    Review of Systems: All negative except where noted in HPI    MEDICATIONS  (STANDING):  amLODIPine   Tablet 10 milliGRAM(s) Oral daily  atorvastatin 40 milliGRAM(s) Oral at bedtime  carvedilol 6.25 milliGRAM(s) Oral every 12 hours  ceFAZolin  Injectable. 2000 milliGRAM(s) IV Push every 8 hours  heparin   Injectable 5000 Unit(s) SubCutaneous every 8 hours  levETIRAcetam 1000 milliGRAM(s) Oral two times a day  polyethylene glycol 3350 17 Gram(s) Oral daily  senna 2 Tablet(s) Oral at bedtime  sodium bicarbonate 650 milliGRAM(s) Oral every 8 hours  sodium bicarbonate  Infusion 0.081 mEq/kG/Hr (83 mL/Hr) IV Continuous <Continuous>  sodium chloride 0.9%. 1000 milliLiter(s) (100 mL/Hr) IV Continuous <Continuous>  tamsulosin 0.4 milliGRAM(s) Oral at bedtime    MEDICATIONS  (PRN):  acetaminophen     Tablet .. 650 milliGRAM(s) Oral every 6 hours PRN Temp greater or equal to 38C (100.4F), Mild Pain (1 - 3)  aluminum hydroxide/magnesium hydroxide/simethicone Suspension 30 milliLiter(s) Oral every 4 hours PRN Dyspepsia  HYDROmorphone  Injectable 1 milliGRAM(s) IV Push every 4 hours PRN breakthru pain  or prior o dressing changes  melatonin 3 milliGRAM(s) Oral at bedtime PRN Insomnia  ondansetron Injectable 4 milliGRAM(s) IV Push every 8 hours PRN Nausea and/or Vomiting  oxyCODONE    IR 5 milliGRAM(s) Oral every 4 hours PRN Moderate Pain (4 - 6)  oxyCODONE    IR 10 milliGRAM(s) Oral every 4 hours PRN Severe Pain (7 - 10)    Vital Signs Last 24 Hrs  T(C): 36.8 (03 Oct 2023 04:33), Max: 37 (03 Oct 2023 02:45)  T(F): 98.3 (03 Oct 2023 04:33), Max: 98.6 (03 Oct 2023 02:45)  HR: 73 (03 Oct 2023 04:33) (60 - 76)  BP: 146/72 (03 Oct 2023 04:33) (128/74 - 149/86)  BP(mean): --  RR: 18 (03 Oct 2023 04:33) (17 - 18)  SpO2: 92% (03 Oct 2023 04:33) (92% - 96%)    Parameters below as of 03 Oct 2023 04:33  Patient On (Oxygen Delivery Method): room air    Physical Exam:  Gen: lying down in bed, appears uncomfortable  HEENT: PERRL, EOMI, NGT in place  Neurological: Alert and oriented x3 without focal deficit  Neck: Trachea midline, no evidence of JVD, FROM without pain, neck symmetric  Pulmonary: CTAB with decreased breath sounds at the bases  Cardiovascular: regular rate and rhythm  Gastrointestinal: distended, tender to palpation  : unremarkable  Extremities: LUE wrapped with ace, dressing C/D/I  Skin: Intact  Musculoskeletal: FROM without pain, no deformity or areas of tenderness    LABS:                        10.2   11.18 )-----------( 149      ( 02 Oct 2023 05:19 )             31.5     10-02    137  |  101  |  46.0<H>  ----------------------------<  136<H>  3.9   |  23.0  |  1.64<H>    Ca    8.0<L>      02 Oct 2023 05:19  Phos  3.1     10-02  Mg     2.0     10-02    TPro  6.1<L>  /  Alb  2.1<L>  /  TBili  0.3<L>  /  DBili  x   /  AST  32  /  ALT  <5  /  AlkPhos  143<H>  10-02    Urinalysis Basic - ( 02 Oct 2023 05:19 )    Color: x / Appearance: x / SG: x / pH: x  Gluc: 136 mg/dL / Ketone: x  / Bili: x / Urobili: x   Blood: x / Protein: x / Nitrite: x   Leuk Esterase: x / RBC: x / WBC x   Sq Epi: x / Non Sq Epi: x / Bacteria: x    Impression and Plan:  63 y.o male with multiple medical comorbidities, most recently with saph epi bacteremia/endocarditis, s/p L AV graft explantation, now with nausea, vomiting and abdominal distention, concerning for ileus vs. small bowel obstruction.     - CT abdomen pelvis  - NGT LCWS  - NPO  - surgery will follow     Plan to be discussed with Dr. Brody

## 2023-10-03 NOTE — CHART NOTE - NSCHARTNOTEFT_GEN_A_CORE
Patient was having nausea and we gave another round fo Zofran, after 3am.   No vomiting at the time, just nausea. No other symptoms, said he vomited previously but not this time.

## 2023-10-03 NOTE — CHART NOTE - NSCHARTNOTEFT_GEN_A_CORE
Patient continued to vomit despite getting the Zofran. His stomach was soft, tender, but non-distended.  He endorsed still feeling nauseous but stopped vomiting for a little. I gave him Reglan to help with his nausea and vomiting.

## 2023-10-03 NOTE — PROGRESS NOTE ADULT - ASSESSMENT
Improving  SANTOS on CKD stage III serum cr overall improving- back to baseline   - Baseline 1.6 ---> h/o temporary HD ending in Nov 2021 --> was on HD x 1 year   No hydronephrosis on CT 9/23   Vanco level was 28 on 9/28-  likely contributing to rise in serum Cr     Sepsis with Staph epidermidis bacteremia and prosthetic MV endocarditis  Thrombosed L UE AVG likely source  Went to OR w Vascular 10/1--> Excision of L graft  ID eval appreciated, PCN allery--> on Cefazolin    MA - likely 2/2 CKD - resolved    Renally dose meds  Avoid nephrotoxic agents  AM labs will follow

## 2023-10-03 NOTE — PROVIDER CONTACT NOTE (CRITICAL VALUE NOTIFICATION) - SITUATION
PA made aware of critical calcium 6.4, will continue to monitor
growth in aerobic and anaerobic bottles gram + cocci in clusters. will continue to monitor
patient has positive blood cultures in aerobic bottle gram positive cocci and culsters
MD notified of positive blood cultures, preliminary growth in aerobic bottle gram positive cocci in clusters
Abscess cultured 10/1 positive for moderate polymorpho-neuclear leukocyts

## 2023-10-03 NOTE — PROGRESS NOTE ADULT - SUBJECTIVE AND OBJECTIVE BOX
[facility] Division of Hospital Medicine    Chief Complaint:  SANTOS    SUBJECTIVE / OVERNIGHT EVENTS: Patient seen and examined. Patient developed vomiting NGT placed with dark fecal material as output. General surgery consulted. Vascular surgery requesting transfer back to medicine.   Patient seen and examined. NGT in. Reporting abdominal discomfort, he is alert and oriented.     Patient denies chest pain, SOB,  fever, chills, dysuria or any other complaints. All remainder ROS negative.     MEDICATIONS  (STANDING):  amLODIPine   Tablet 10 milliGRAM(s) Oral daily  atorvastatin 40 milliGRAM(s) Oral at bedtime  carvedilol 6.25 milliGRAM(s) Oral every 12 hours  ceFAZolin  Injectable. 2000 milliGRAM(s) IV Push every 8 hours  heparin   Injectable 5000 Unit(s) SubCutaneous every 8 hours  levETIRAcetam 1000 milliGRAM(s) Oral two times a day  polyethylene glycol 3350 17 Gram(s) Oral daily  potassium chloride  10 mEq/100 mL IVPB 10 milliEquivalent(s) IV Intermittent every 1 hour  senna 2 Tablet(s) Oral at bedtime  sodium bicarbonate 650 milliGRAM(s) Oral every 8 hours  sodium bicarbonate  Infusion 0.081 mEq/kG/Hr (83 mL/Hr) IV Continuous <Continuous>  sodium chloride 0.9%. 1000 milliLiter(s) (100 mL/Hr) IV Continuous <Continuous>  tamsulosin 0.4 milliGRAM(s) Oral at bedtime    MEDICATIONS  (PRN):  acetaminophen     Tablet .. 650 milliGRAM(s) Oral every 6 hours PRN Temp greater or equal to 38C (100.4F), Mild Pain (1 - 3)  aluminum hydroxide/magnesium hydroxide/simethicone Suspension 30 milliLiter(s) Oral every 4 hours PRN Dyspepsia  HYDROmorphone  Injectable 1 milliGRAM(s) IV Push every 4 hours PRN breakthru pain  or prior o dressing changes  melatonin 3 milliGRAM(s) Oral at bedtime PRN Insomnia  ondansetron Injectable 4 milliGRAM(s) IV Push every 8 hours PRN Nausea and/or Vomiting  oxyCODONE    IR 10 milliGRAM(s) Oral every 4 hours PRN Severe Pain (7 - 10)  oxyCODONE    IR 5 milliGRAM(s) Oral every 4 hours PRN Moderate Pain (4 - 6)        I&O's Summary    02 Oct 2023 07:01  -  03 Oct 2023 07:00  --------------------------------------------------------  IN: 830 mL / OUT: 400 mL / NET: 430 mL        PHYSICAL EXAM:  Vital Signs Last 24 Hrs  T(C): 36.9 (03 Oct 2023 09:26), Max: 37 (03 Oct 2023 02:45)  T(F): 98.4 (03 Oct 2023 09:26), Max: 98.6 (03 Oct 2023 02:45)  HR: 85 (03 Oct 2023 09:26) (60 - 85)  BP: 148/84 (03 Oct 2023 09:26) (131/69 - 149/86)  BP(mean): --  RR: 18 (03 Oct 2023 09:26) (17 - 18)  SpO2: 93% (03 Oct 2023 09:26) (92% - 96%)    Parameters below as of 03 Oct 2023 09:26  Patient On (Oxygen Delivery Method): room air            CONSTITUTIONAL: NAD, chronically ill appearing   ENMT: Moist oral mucosa, no pharyngeal injection or exudates  RESPIRATORY: Normal respiratory effort; lungs are clear to auscultation bilaterally  CARDIOVASCULAR: Regular rate and rhythm, normal S1 and S2, No lower extremity edema  ABDOMEN: tender to palpation, hypoactive bowel sounds, no rebound/guarding, + NGT with dark output  MUSCLOSKELETAL:  left forearm wrapped in ace bandage  PSYCH: A+O to person, place, and time; affect appropriate  NEUROLOGY: CN 2-12 are intact and symmetric; no gross sensory deficits;   SKIN: No rashes; no palpable lesions    LABS:                        11.6   25.01 )-----------( 172      ( 03 Oct 2023 09:52 )             35.8     10-03    142  |  100  |  42.0<H>  ----------------------------<  138<H>  3.8   |  31.0<H>  |  1.59<H>    Ca    8.6      03 Oct 2023 09:52  Phos  2.9     10-03  Mg     2.0     10-03    TPro  6.1<L>  /  Alb  2.1<L>  /  TBili  0.3<L>  /  DBili  x   /  AST  32  /  ALT  <5  /  AlkPhos  143<H>  10-02          Urinalysis Basic - ( 03 Oct 2023 09:52 )    Color: x / Appearance: x / SG: x / pH: x  Gluc: 138 mg/dL / Ketone: x  / Bili: x / Urobili: x   Blood: x / Protein: x / Nitrite: x   Leuk Esterase: x / RBC: x / WBC x   Sq Epi: x / Non Sq Epi: x / Bacteria: x        Culture - Abscess with Gram Stain (collected 01 Oct 2023 12:00)  Source: .Abscess arm abscess  Gram Stain (03 Oct 2023 14:05):    Upon re-evaluation of gram stain:    No organisms seen per oil power field    Moderate polymorphonuclear leukocytes seen per low power field    Previously reported as:    Rare Gram positive cocci in pairs seen per oil power field    Rare Gram Negative Rods seen per oil power field  Preliminary Report (03 Oct 2023 14:01):    No growth      CAPILLARY BLOOD GLUCOSE      POCT Blood Glucose.: 121 mg/dL (02 Oct 2023 19:25)        RADIOLOGY & ADDITIONAL TESTS:  Results Reviewed:   Imaging Personally Reviewed:  Electrocardiogram Personally Reviewed:

## 2023-10-03 NOTE — PROVIDER CONTACT NOTE (CRITICAL VALUE NOTIFICATION) - TEST AND RESULT REPORTED:
previously reports abcess culutre, sma as 10/2
blood culture results
blood cultures
calcium
Abscess cultured 10/1 positive for moderate polymorpho-neuclear leukocyts
blood cultures
vanco trough 28.2

## 2023-10-03 NOTE — PROGRESS NOTE ADULT - SUBJECTIVE AND OBJECTIVE BOX
INTERVAL HPI/OVERNIGHT EVENTS: Patient interviewed and examined at bedside this morning. Patient reports nausea and vomiting throughout the night into this morning. States +BM and + flatus. Notes past history of abdominal surgery but unable to give specifics. Denies fever, chills, shortness of breath, chest pain, abdominal pain, dysuria, or any other concerning symptoms.    STATUS POST:  LUE AV graft removal     POST OPERATIVE DAY #: 2    MEDICATIONS  (STANDING):  amLODIPine   Tablet 10 milliGRAM(s) Oral daily  atorvastatin 40 milliGRAM(s) Oral at bedtime  carvedilol 6.25 milliGRAM(s) Oral every 12 hours  ceFAZolin  Injectable. 2000 milliGRAM(s) IV Push every 8 hours  heparin   Injectable 5000 Unit(s) SubCutaneous every 8 hours  levETIRAcetam 1000 milliGRAM(s) Oral two times a day  polyethylene glycol 3350 17 Gram(s) Oral daily  senna 2 Tablet(s) Oral at bedtime  sodium bicarbonate 650 milliGRAM(s) Oral every 8 hours  sodium bicarbonate  Infusion 0.081 mEq/kG/Hr (83 mL/Hr) IV Continuous <Continuous>  sodium chloride 0.9%. 1000 milliLiter(s) (100 mL/Hr) IV Continuous <Continuous>  tamsulosin 0.4 milliGRAM(s) Oral at bedtime    MEDICATIONS  (PRN):  acetaminophen     Tablet .. 650 milliGRAM(s) Oral every 6 hours PRN Temp greater or equal to 38C (100.4F), Mild Pain (1 - 3)  aluminum hydroxide/magnesium hydroxide/simethicone Suspension 30 milliLiter(s) Oral every 4 hours PRN Dyspepsia  HYDROmorphone  Injectable 1 milliGRAM(s) IV Push every 4 hours PRN breakthru pain  or prior o dressing changes  melatonin 3 milliGRAM(s) Oral at bedtime PRN Insomnia  ondansetron Injectable 4 milliGRAM(s) IV Push every 8 hours PRN Nausea and/or Vomiting  oxyCODONE    IR 5 milliGRAM(s) Oral every 4 hours PRN Moderate Pain (4 - 6)  oxyCODONE    IR 10 milliGRAM(s) Oral every 4 hours PRN Severe Pain (7 - 10)    Vital Signs Last 24 Hrs  T(C): 36.8 (03 Oct 2023 04:33), Max: 37 (03 Oct 2023 02:45)  T(F): 98.3 (03 Oct 2023 04:33), Max: 98.6 (03 Oct 2023 02:45)  HR: 73 (03 Oct 2023 04:33) (58 - 76)  BP: 146/72 (03 Oct 2023 04:33) (115/46 - 149/86)  BP(mean): --  RR: 18 (03 Oct 2023 04:33) (17 - 18)  SpO2: 92% (03 Oct 2023 04:33) (92% - 96%)    Parameters below as of 03 Oct 2023 04:33  Patient On (Oxygen Delivery Method): room air    PHYSICAL EXAM:  Constitutional: Patient appears ill but no acute distress noted. A&O x 3. WN/WD, appears stated age.    Respiratory: Patient breathing non-labored on room air. No accessory muscle use, no acute distress.    Gastrointestinal: Abdomen moderately distended and markedly tender to palpation. Abdomen soft. Past surgical scar noted in hypogastric region.    Extremities: LUE bandaged; patient refused to let providers unwrap bandaged. Moving all 4 extremities. Ulnar pulse palpable on LUE.    Vascular: Ulnar pulse palpable on LUE. Extremities warm and well-perfused.    Skin: Incision sites obscured by bandages. No new rashes or erythema.    I&O's Detail    01 Oct 2023 07:01  -  02 Oct 2023 07:00  --------------------------------------------------------  IN:    Oral Fluid: 600 mL    Sodium Bicarbonate: 996 mL  Total IN: 1596 mL    OUT:  Total OUT: 0 mL    Total NET: 1596 mL      02 Oct 2023 07:01  -  03 Oct 2023 06:35  --------------------------------------------------------  IN:    Sodium Bicarbonate: 830 mL  Total IN: 830 mL    OUT:    Emesis (mL): 400 mL  Total OUT: 400 mL    Total NET: 430 mL    LABS:                        10.2   11.18 )-----------( 149      ( 02 Oct 2023 05:19 )             31.5     10-02    137  |  101  |  46.0<H>  ----------------------------<  136<H>  3.9   |  23.0  |  1.64<H>    Ca    8.0<L>      02 Oct 2023 05:19  Phos  3.1     10-02  Mg     2.0     10-02    TPro  6.1<L>  /  Alb  2.1<L>  /  TBili  0.3<L>  /  DBili  x   /  AST  32  /  ALT  <5  /  AlkPhos  143<H>  10-02    PT/INR - ( 01 Oct 2023 08:30 )   PT: 13.6 sec;   INR: 1.23 ratio         PTT - ( 01 Oct 2023 08:30 )  PTT:30.1 sec  Urinalysis Basic - ( 02 Oct 2023 05:19 )    Color: x / Appearance: x / SG: x / pH: x  Gluc: 136 mg/dL / Ketone: x  / Bili: x / Urobili: x   Blood: x / Protein: x / Nitrite: x   Leuk Esterase: x / RBC: x / WBC x   Sq Epi: x / Non Sq Epi: x / Bacteria: x        RADIOLOGY & ADDITIONAL STUDIES:  STAT KUB, 10/3/23, 05:45  Official read pending; image shows multiple dilated loops of small bowel. Possible ileus but cannot rule out SBO; will follow with official read. INTERVAL HPI/OVERNIGHT EVENTS: Patient interviewed and examined at bedside this morning. Patient reports nausea and vomiting throughout the night into this morning. States +BM and + flatus.  Denies fever, chills, shortness of breath, chest pain, abdominal pain, dysuria, or any other concerning symptoms.    STATUS POST:  LUE AV graft removal     POST OPERATIVE DAY #: 2    MEDICATIONS  (STANDING):  amLODIPine   Tablet 10 milliGRAM(s) Oral daily  atorvastatin 40 milliGRAM(s) Oral at bedtime  carvedilol 6.25 milliGRAM(s) Oral every 12 hours  ceFAZolin  Injectable. 2000 milliGRAM(s) IV Push every 8 hours  heparin   Injectable 5000 Unit(s) SubCutaneous every 8 hours  levETIRAcetam 1000 milliGRAM(s) Oral two times a day  polyethylene glycol 3350 17 Gram(s) Oral daily  senna 2 Tablet(s) Oral at bedtime  sodium bicarbonate 650 milliGRAM(s) Oral every 8 hours  sodium bicarbonate  Infusion 0.081 mEq/kG/Hr (83 mL/Hr) IV Continuous <Continuous>  sodium chloride 0.9%. 1000 milliLiter(s) (100 mL/Hr) IV Continuous <Continuous>  tamsulosin 0.4 milliGRAM(s) Oral at bedtime    MEDICATIONS  (PRN):  acetaminophen     Tablet .. 650 milliGRAM(s) Oral every 6 hours PRN Temp greater or equal to 38C (100.4F), Mild Pain (1 - 3)  aluminum hydroxide/magnesium hydroxide/simethicone Suspension 30 milliLiter(s) Oral every 4 hours PRN Dyspepsia  HYDROmorphone  Injectable 1 milliGRAM(s) IV Push every 4 hours PRN breakthru pain  or prior o dressing changes  melatonin 3 milliGRAM(s) Oral at bedtime PRN Insomnia  ondansetron Injectable 4 milliGRAM(s) IV Push every 8 hours PRN Nausea and/or Vomiting  oxyCODONE    IR 5 milliGRAM(s) Oral every 4 hours PRN Moderate Pain (4 - 6)  oxyCODONE    IR 10 milliGRAM(s) Oral every 4 hours PRN Severe Pain (7 - 10)    Vital Signs Last 24 Hrs  T(C): 36.8 (03 Oct 2023 04:33), Max: 37 (03 Oct 2023 02:45)  T(F): 98.3 (03 Oct 2023 04:33), Max: 98.6 (03 Oct 2023 02:45)  HR: 73 (03 Oct 2023 04:33) (58 - 76)  BP: 146/72 (03 Oct 2023 04:33) (115/46 - 149/86)  BP(mean): --  RR: 18 (03 Oct 2023 04:33) (17 - 18)  SpO2: 92% (03 Oct 2023 04:33) (92% - 96%)    Parameters below as of 03 Oct 2023 04:33  Patient On (Oxygen Delivery Method): room air    PHYSICAL EXAM:  Constitutional: Patient appears ill but no acute distress noted. A&O x 3. WN/WD, appears stated age.    Respiratory: Patient breathing non-labored on room air. No accessory muscle use, no acute distress.    Gastrointestinal: Abdomen moderately distended and markedly tender to palpation. Abdomen soft. Past surgical scar noted in hypogastric region.    Extremities: LUE bandaged; patient refused to let providers unwrap bandaged. Moving all 4 extremities. Ulnar pulse palpable on LUE.    Vascular: Ulnar pulse palpable on LUE. Extremities warm and well-perfused.    Skin: Incision sites obscured by bandages. No new rashes or erythema.    Psych: anxious appearing    I&O's Detail    01 Oct 2023 07:01  -  02 Oct 2023 07:00  --------------------------------------------------------  IN:    Oral Fluid: 600 mL    Sodium Bicarbonate: 996 mL  Total IN: 1596 mL    OUT:  Total OUT: 0 mL    Total NET: 1596 mL      02 Oct 2023 07:01  -  03 Oct 2023 06:35  --------------------------------------------------------  IN:    Sodium Bicarbonate: 830 mL  Total IN: 830 mL    OUT:    Emesis (mL): 400 mL  Total OUT: 400 mL    Total NET: 430 mL    LABS:                        10.2   11.18 )-----------( 149      ( 02 Oct 2023 05:19 )             31.5     10-02    137  |  101  |  46.0<H>  ----------------------------<  136<H>  3.9   |  23.0  |  1.64<H>    Ca    8.0<L>      02 Oct 2023 05:19  Phos  3.1     10-02  Mg     2.0     10-02    TPro  6.1<L>  /  Alb  2.1<L>  /  TBili  0.3<L>  /  DBili  x   /  AST  32  /  ALT  <5  /  AlkPhos  143<H>  10-02    PT/INR - ( 01 Oct 2023 08:30 )   PT: 13.6 sec;   INR: 1.23 ratio         PTT - ( 01 Oct 2023 08:30 )  PTT:30.1 sec  Urinalysis Basic - ( 02 Oct 2023 05:19 )    Color: x / Appearance: x / SG: x / pH: x  Gluc: 136 mg/dL / Ketone: x  / Bili: x / Urobili: x   Blood: x / Protein: x / Nitrite: x   Leuk Esterase: x / RBC: x / WBC x   Sq Epi: x / Non Sq Epi: x / Bacteria: x        RADIOLOGY & ADDITIONAL STUDIES:  STAT KUB, 10/3/23, 05:45  Official read pending; image shows multiple dilated loops of small bowel. Possible ileus but cannot rule out SBO; will follow with official read.

## 2023-10-03 NOTE — PROGRESS NOTE ADULT - SUBJECTIVE AND OBJECTIVE BOX
[facility]  INFECTIOUS DISEASES at [place] / [place] / [place]  =======================================================                               Roosevelt Victor MD#   Fabián Quigley MD*                             Valentine Handley MD*   Reba Catalan MD*                              Professor Emeritus:  Dr Johnny Hathaway MD^            Diplomates American Board of Internal Medicine & Infectious Diseases                # [place] Office - Appt - Tel  [phone] Fax [phone]                * [place] Office - Appt - Tel [phone] Fax [phone]                      ^[place] Office - Tel  [phone] Fax [phone]                                  Hospital Consult line:  [phone]  =======================================================    JONATHAN GIBSON [id number]    Follow up: MV Endocarditis     s/p Left AV graft removal 10/1/23    no fevers   Developed nausea and vomiting overnight   s/p NGT    Allergies:  penicillin (Other)       REVIEW OF SYSTEMS:  CONSTITUTIONAL:  No Fever or chills  HEENT:  No diplopia or blurred vision.  No earache, sore throat or runny nose.  CARDIOVASCULAR:  No chest pain  RESPIRATORY:  No cough, shortness of breath  GASTROINTESTINAL:  + nausea, + vomiting.  GENITOURINARY:  No dysuria, frequency or urgency. No Blood in urine  MUSCULOSKELETAL:  no joint aches, no muscle pain  SKIN:  No change in skin, hair or nails.  NEUROLOGIC:  No Headaches      Physical Exam:  GEN: NAD  HEENT: normocephalic and atraumatic. EOMI. PERRL.    NECK: Supple.   LUNGS: CTA B/L.  HEART: RRR  ABDOMEN: Soft, NT.  +BS.  + distended   : No CVA tenderness  EXTREMITIES: Without  edema.  MSK: No joint swelling  NEUROLOGIC: No Focal Deficits       Vitals:  T(F): 98.3 (03 Oct 2023 04:33), Max: 98.6 (03 Oct 2023 02:45)  HR: 73 (03 Oct 2023 04:33)  BP: 146/72 (03 Oct 2023 04:33)  RR: 18 (03 Oct 2023 04:33)  SpO2: 92% (03 Oct 2023 04:33) (92% - 96%)  temp max in last 48H T(F): , Max: 98.6 (10-03-23 @ 02:45)    Current Antibiotics:  ceFAZolin  Injectable. 2000 milliGRAM(s) IV Push every 8 hours    Other medications:  amLODIPine   Tablet 10 milliGRAM(s) Oral daily  atorvastatin 40 milliGRAM(s) Oral at bedtime  carvedilol 6.25 milliGRAM(s) Oral every 12 hours  heparin   Injectable 5000 Unit(s) SubCutaneous every 8 hours  levETIRAcetam 1000 milliGRAM(s) Oral two times a day  polyethylene glycol 3350 17 Gram(s) Oral daily  senna 2 Tablet(s) Oral at bedtime  sodium bicarbonate 650 milliGRAM(s) Oral every 8 hours  sodium bicarbonate  Infusion 0.081 mEq/kG/Hr IV Continuous <Continuous>  sodium chloride 0.9%. 1000 milliLiter(s) IV Continuous <Continuous>  tamsulosin 0.4 milliGRAM(s) Oral at bedtime                            11.6   25.01 )-----------( 172      ( 03 Oct 2023 09:52 )             35.8     10-03    142  |  100  |  42.0<H>  ----------------------------<  138<H>  3.8   |  31.0<H>  |  1.59<H>    Ca    8.6      03 Oct 2023 09:52  Phos  2.9     10-03  Mg     2.0     10-03    TPro  6.1<L>  /  Alb  2.1<L>  /  TBili  0.3<L>  /  DBili  x   /  AST  32  /  ALT  <5  /  AlkPhos  143<H>  10-02    RECENT CULTURES:  10-01 @ 12:00 .Abscess arm abscess     Moderate polymorphonuclear leukocytes seen per low power field  Rare Gram positive cocci in pairs seen per oil power field  Rare Gram Negative Rods seen per oil power field    09-27 @ 14:47 .Blood Blood-Peripheral     No growth at 5 days    09-27 @ 14:42 .Blood Blood-Peripheral     No growth at 5 days    09-25 @ 06:50 .Stool Feces     No enteric pathogens isolated.  (Stool culture examined for Salmonella,  Shigella, Campylobacter, Aeromonas, Plesiomonas,  Vibrio, E.coli O157 and Yersinia)    09-25 @ 06:00    RVP  NotDetec    09-25 @ 05:50 .Blood Blood-Peripheral Staphylococcus epidermidis    Growth in aerobic and anaerobic bottles: Staphylococcus epidermidis  Growth in aerobic bottle: Gram Positive Cocci in Clusters  Growth in anaerobic bottle: Gram Positive Cocci in Clusters    09-25 @ 05:13 .Blood Blood-Peripheral     Growth in aerobic bottle: Staphylococcus epidermidis  "Susceptibilities not performed"  Growth in aerobic bottle: Gram Positive Cocci in Clusters    09-25 @ 01:00 Clean Catch Clean Catch (Midstream)     <10,000 CFU/mL Normal Urogenital Sivan    09-23 @ 19:30 .Blood Blood-Peripheral     Growth in aerobic and anaerobic bottles: Staphylococcus epidermidis  "Susceptibilities not performed"  Growth in aerobic bottle: Gram Positive Cocci in Clusters  Growth in anaerobic bottle: Gram Positive Cocci in Clusters      09-23 @ 19:25 .Blood Blood-Peripheral Blood Culture PCR  Staphylococcus epidermidis    Growth in aerobic and anaerobic bottles: Staphylococcus epidermidis  Direct identification is available within approximately 3-5  hours either by Blood Panel Multiplexed PCR or Direct  MALDI-TOF. Details: [email or web address]  Growth in aerobic bottle: Gram Positive Cocci in Clusters  Growth in anaerobic bottle: Gram Positive Cocci in Clusters      WBC Count: 25.01 K/uL (10-03-23 @ 09:52)  WBC Count: 11.18 K/uL (10-02-23 @ 05:19)  WBC Count: 20.84 K/uL (10-01-23 @ 05:20)  WBC Count: 20.03 K/uL (09-30-23 @ 06:12)  WBC Count: 18.17 K/uL (09-29-23 @ 12:15)    Creatinine: 1.59 mg/dL (10-03-23 @ 09:52)  Creatinine: 1.64 mg/dL (10-02-23 @ 05:19)  Creatinine: 1.93 mg/dL (10-01-23 @ 05:20)  Creatinine: 1.97 mg/dL (09-30-23 @ 06:12)  Creatinine: 1.72 mg/dL (09-29-23 @ 12:15)    Procalcitonin, Serum: 1.75 ng/mL (09-25-23 @ 05:50)     SARS-CoV-2: NotDetec (09-25-23 @ 06:00)        < from: Xray Chest 1 View- PORTABLE-Urgent (Xray Chest 1 View- PORTABLE-Urgent .) (10.03.23 @ 07:14) >    ACC: [id number] EXAM:  XR KUB 1 VIEW   ORDERED BY: SANJEEV KELLY     ACC: [id number] EXAM:  XR CHEST PORTABLE URGENT 1V   ORDERED BY: BELKIS PORTER     PROCEDURE DATE:  10/03/2023          INTERPRETATION:  EXAM: XR CHEST URGENT, XR ABDOMEN KUB    INDICATION: Admitting Dxs: N17.9 ACUTE KIDNEY FAILURE, UNSPECIFIED ng   tube XXR    COMPARISON: See below    IMPRESSION:    Audible chest October 3 at 6:43 AM: Exam is compared to September 25 at   5:32 AM. NG tube has appeared since previous exam the sidehole in the   distal esophagus by the EG junction region. Follow-up suggested. Postop   chest findings are unchanged. The apices are omitted from this exam.    Abdomen October 3 at 5:42 AM this exam is taken before the placement of   the NG tube seen on the chest film. Again postop changes in the chest as   before. Distended small bowel loops noted. Some stool in the rectum.   Findings compatible with ileus although an small bowel obstructive   pattern cannot be excluded.    --- End of Report ---    < end of copied text >          < from: MUSTAPHA Echo Doppler (09.27.23 @ 10:59) >  Summary:   1. Left ventricular ejection fraction, by visual estimation, is 55 to 60%.   2. Severely enlarged left atrium.   3. Mild mitral valve regurgitation.   4. Mitral prosthesis vegetation.   5. There is a bioprosthetic valve in the mitral position with thickened   leaflets yet leaflet mobility is fairly preserved. There is trace to mild   valvular regurgitation with no perivalvular leaks. No rocking motion or   dehiscence noted. There is a large and mobile vegetation measuring   1.8x1.0 cm attached to the atrial surface of the medial bioprosthetic   leaflet. There is evidence of at least mild valve stenosis with a mean   transvalvular gradient of 5 mmHg at a heart rate of 60 bpm. The mitral   valve area by 3D is 1.57 cm2.   6. Mild tricuspid regurgitation.   7. Bioprosthesis in the aortic position.   8. Dilatation of the aortic root.    < end of copied text >      < from: TTE Echo Complete w/o Contrast w/ Doppler (09.26.23 @ 14:32) >  Summary:   1. Left ventricular ejection fraction, by visual estimation, is 50 to   55%.   2. Normal global left ventricular systolic function.   3. Diastolic function indeterminate.   4. Abnormal septal motion consistent with post-operative status.   5. Normal RV size with mildly reduced RV systolic function, inadequate   estimation of RVSP.   6. Bioprosthetic mitral valve present with a large mobile lesion (at   least 2.0 cm x 1.5 cm) prolapsing into and out of the LV-LA cavity,   hightly suggestive of vegetation. Mean transmitral valve gradient 4.8   mmHg (at HR of 60 bpm).   7. Bioprosthesis in the aortic position, mean gradient of 9 mmHg.   8. There is no evidence of pericardial effusion.   9. Compared to the prior TTE study from 5/23/23, lesion over bioMVR is   again noted and is now much larger and recommend MUSTAPHA study to confirm   vegetation and exclude other valvular involvement.    < end of copied text >

## 2023-10-03 NOTE — PROGRESS NOTE ADULT - ASSESSMENT
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 Patient presented to [facility] for 2 weeks of inability to tolerate oral intake. Denies any fever or chills. Has chronic diarrhea due to bowel resection. In the ER patient was afebrile. Found to have leukocytosis to 23k. Patient was found to have SANTOS. Had a febrile episode to 102F on 9/25. Started on Vancomycin and Meropenem. Blood cultures 1 of 4 bottles with Staphylococcus epidermidis      Prosthetic MV endocarditis   Staphylococcus epidermidis bacteremia  Infected thrombosed Graft   s/p Left AV graft removal 10/1/23  Fever  Leukocytosis   SANTOS   PCN allergy   Ileus     - Blood cultures 9/23, 9/25 reporting Staphylococcus epidermidis  - Repeat blood cultures  9/27 no growth   - RVP/COVID 19 PCR 9/25 negative   - CT A/P 9/23 reporting no acute findings   - CXR 9/23 no PNA  - Now CXR with ileus on 10/3  - TTE reporting MV veg  - MUSTAPHA reporting large mobile density MV  - UA 9/25 negative for UTI   - Procalcitonin level not reliable in SANTOS   - PCN allergy but has tolerated Zosyn and Zinacef in the past.   - Vascular surgery following for thrombosed graft. s/p Left AV graft removal 10/1/23  - Continue Cefazolin   - Due to renal failure will hold off on Gentamicin  - Due to Eliquis unable to use Rifampin   - Plan for Repeat MUSTAPHA on or after 10/24/23  - Follow up cultures  - Trend Fever  - Trend WBC      Will Follow    Discussed treatment plan with: RN

## 2023-10-04 LAB
ALBUMIN SERPL ELPH-MCNC: 2.4 G/DL — LOW (ref 3.3–5.2)
ALP SERPL-CCNC: 134 U/L — HIGH (ref 40–120)
ALT FLD-CCNC: <5 U/L — SIGNIFICANT CHANGE UP
ANION GAP SERPL CALC-SCNC: 9 MMOL/L — SIGNIFICANT CHANGE UP (ref 5–17)
APPEARANCE UR: CLEAR — SIGNIFICANT CHANGE UP
AST SERPL-CCNC: 30 U/L — SIGNIFICANT CHANGE UP
BACTERIA # UR AUTO: ABNORMAL
BASOPHILS # BLD AUTO: 0.05 K/UL — SIGNIFICANT CHANGE UP (ref 0–0.2)
BASOPHILS NFR BLD AUTO: 0.3 % — SIGNIFICANT CHANGE UP (ref 0–2)
BILIRUB SERPL-MCNC: 0.4 MG/DL — SIGNIFICANT CHANGE UP (ref 0.4–2)
BILIRUB UR-MCNC: NEGATIVE — SIGNIFICANT CHANGE UP
BUN SERPL-MCNC: 35.9 MG/DL — HIGH (ref 8–20)
CALCIUM SERPL-MCNC: 8.6 MG/DL — SIGNIFICANT CHANGE UP (ref 8.4–10.5)
CHLORIDE SERPL-SCNC: 106 MMOL/L — SIGNIFICANT CHANGE UP (ref 96–108)
CHLORIDE UR-SCNC: <27 MMOL/L — SIGNIFICANT CHANGE UP
CO2 SERPL-SCNC: 31 MMOL/L — HIGH (ref 22–29)
COLOR SPEC: YELLOW — SIGNIFICANT CHANGE UP
CREAT SERPL-MCNC: 1.62 MG/DL — HIGH (ref 0.5–1.3)
DIFF PNL FLD: ABNORMAL
EGFR: 48 ML/MIN/1.73M2 — LOW
EOSINOPHIL # BLD AUTO: 0.21 K/UL — SIGNIFICANT CHANGE UP (ref 0–0.5)
EOSINOPHIL NFR BLD AUTO: 1.3 % — SIGNIFICANT CHANGE UP (ref 0–6)
EPI CELLS # UR: SIGNIFICANT CHANGE UP
GLUCOSE SERPL-MCNC: 107 MG/DL — HIGH (ref 70–99)
GLUCOSE UR QL: NEGATIVE MG/DL — SIGNIFICANT CHANGE UP
HCT VFR BLD CALC: 33.3 % — LOW (ref 39–50)
HGB BLD-MCNC: 10.4 G/DL — LOW (ref 13–17)
IMM GRANULOCYTES NFR BLD AUTO: 0.4 % — SIGNIFICANT CHANGE UP (ref 0–0.9)
KETONES UR-MCNC: NEGATIVE — SIGNIFICANT CHANGE UP
LEUKOCYTE ESTERASE UR-ACNC: NEGATIVE — SIGNIFICANT CHANGE UP
LYMPHOCYTES # BLD AUTO: 0.96 K/UL — LOW (ref 1–3.3)
LYMPHOCYTES # BLD AUTO: 5.9 % — LOW (ref 13–44)
MAGNESIUM SERPL-MCNC: 2.2 MG/DL — SIGNIFICANT CHANGE UP (ref 1.6–2.6)
MCHC RBC-ENTMCNC: 28.1 PG — SIGNIFICANT CHANGE UP (ref 27–34)
MCHC RBC-ENTMCNC: 31.2 GM/DL — LOW (ref 32–36)
MCV RBC AUTO: 90 FL — SIGNIFICANT CHANGE UP (ref 80–100)
MONOCYTES # BLD AUTO: 1.02 K/UL — HIGH (ref 0–0.9)
MONOCYTES NFR BLD AUTO: 6.3 % — SIGNIFICANT CHANGE UP (ref 2–14)
NEUTROPHILS # BLD AUTO: 13.93 K/UL — HIGH (ref 1.8–7.4)
NEUTROPHILS NFR BLD AUTO: 85.8 % — HIGH (ref 43–77)
NITRITE UR-MCNC: NEGATIVE — SIGNIFICANT CHANGE UP
OSMOLALITY UR: 455 MOSM/KG — SIGNIFICANT CHANGE UP (ref 300–1000)
PH UR: 6 — SIGNIFICANT CHANGE UP (ref 5–8)
PLATELET # BLD AUTO: 147 K/UL — LOW (ref 150–400)
POTASSIUM SERPL-MCNC: 4 MMOL/L — SIGNIFICANT CHANGE UP (ref 3.5–5.3)
POTASSIUM SERPL-SCNC: 4 MMOL/L — SIGNIFICANT CHANGE UP (ref 3.5–5.3)
POTASSIUM UR-SCNC: 29 MMOL/L — SIGNIFICANT CHANGE UP
PROT SERPL-MCNC: 6.4 G/DL — LOW (ref 6.6–8.7)
PROT UR-MCNC: 100
RBC # BLD: 3.7 M/UL — LOW (ref 4.2–5.8)
RBC # FLD: 13.7 % — SIGNIFICANT CHANGE UP (ref 10.3–14.5)
RBC CASTS # UR COMP ASSIST: ABNORMAL /HPF (ref 0–4)
SODIUM SERPL-SCNC: 146 MMOL/L — HIGH (ref 135–145)
SODIUM UR-SCNC: <30 MMOL/L — SIGNIFICANT CHANGE UP
SP GR SPEC: 1.01 — SIGNIFICANT CHANGE UP (ref 1.01–1.02)
UROBILINOGEN FLD QL: NEGATIVE MG/DL — SIGNIFICANT CHANGE UP
WBC # BLD: 16.24 K/UL — HIGH (ref 3.8–10.5)
WBC # FLD AUTO: 16.24 K/UL — HIGH (ref 3.8–10.5)
WBC UR QL: ABNORMAL /HPF (ref 0–5)

## 2023-10-04 PROCEDURE — 99233 SBSQ HOSP IP/OBS HIGH 50: CPT | Mod: FS

## 2023-10-04 PROCEDURE — 74175 CTA ABDOMEN W/CONTRAST: CPT | Mod: 26

## 2023-10-04 PROCEDURE — 99233 SBSQ HOSP IP/OBS HIGH 50: CPT

## 2023-10-04 RX ORDER — DEXTROSE MONOHYDRATE, SODIUM CHLORIDE, AND POTASSIUM CHLORIDE 50; .745; 4.5 G/1000ML; G/1000ML; G/1000ML
1000 INJECTION, SOLUTION INTRAVENOUS
Refills: 0 | Status: DISCONTINUED | OUTPATIENT
Start: 2023-10-04 | End: 2023-10-08

## 2023-10-04 RX ADMIN — HEPARIN SODIUM 5000 UNIT(S): 5000 INJECTION INTRAVENOUS; SUBCUTANEOUS at 21:06

## 2023-10-04 RX ADMIN — Medication 2000 MILLIGRAM(S): at 05:21

## 2023-10-04 RX ADMIN — LEVETIRACETAM 1000 MILLIGRAM(S): 250 TABLET, FILM COATED ORAL at 16:19

## 2023-10-04 RX ADMIN — HEPARIN SODIUM 5000 UNIT(S): 5000 INJECTION INTRAVENOUS; SUBCUTANEOUS at 16:20

## 2023-10-04 RX ADMIN — LEVETIRACETAM 1000 MILLIGRAM(S): 250 TABLET, FILM COATED ORAL at 05:21

## 2023-10-04 RX ADMIN — AMLODIPINE BESYLATE 10 MILLIGRAM(S): 2.5 TABLET ORAL at 05:21

## 2023-10-04 RX ADMIN — OXYCODONE HYDROCHLORIDE 10 MILLIGRAM(S): 5 TABLET ORAL at 05:30

## 2023-10-04 RX ADMIN — OXYCODONE HYDROCHLORIDE 5 MILLIGRAM(S): 5 TABLET ORAL at 10:58

## 2023-10-04 RX ADMIN — HYDROMORPHONE HYDROCHLORIDE 1 MILLIGRAM(S): 2 INJECTION INTRAMUSCULAR; INTRAVENOUS; SUBCUTANEOUS at 08:32

## 2023-10-04 RX ADMIN — OXYCODONE HYDROCHLORIDE 10 MILLIGRAM(S): 5 TABLET ORAL at 01:30

## 2023-10-04 RX ADMIN — OXYCODONE HYDROCHLORIDE 10 MILLIGRAM(S): 5 TABLET ORAL at 06:30

## 2023-10-04 RX ADMIN — HYDROMORPHONE HYDROCHLORIDE 1 MILLIGRAM(S): 2 INJECTION INTRAMUSCULAR; INTRAVENOUS; SUBCUTANEOUS at 03:01

## 2023-10-04 RX ADMIN — TAMSULOSIN HYDROCHLORIDE 0.4 MILLIGRAM(S): 0.4 CAPSULE ORAL at 21:06

## 2023-10-04 RX ADMIN — Medication 2000 MILLIGRAM(S): at 10:59

## 2023-10-04 RX ADMIN — DEXTROSE MONOHYDRATE, SODIUM CHLORIDE, AND POTASSIUM CHLORIDE 75 MILLILITER(S): 50; .745; 4.5 INJECTION, SOLUTION INTRAVENOUS at 11:04

## 2023-10-04 RX ADMIN — POLYETHYLENE GLYCOL 3350 17 GRAM(S): 17 POWDER, FOR SOLUTION ORAL at 21:06

## 2023-10-04 RX ADMIN — HYDROMORPHONE HYDROCHLORIDE 1 MILLIGRAM(S): 2 INJECTION INTRAMUSCULAR; INTRAVENOUS; SUBCUTANEOUS at 16:20

## 2023-10-04 RX ADMIN — CARVEDILOL PHOSPHATE 6.25 MILLIGRAM(S): 80 CAPSULE, EXTENDED RELEASE ORAL at 05:21

## 2023-10-04 RX ADMIN — HYDROMORPHONE HYDROCHLORIDE 1 MILLIGRAM(S): 2 INJECTION INTRAMUSCULAR; INTRAVENOUS; SUBCUTANEOUS at 12:36

## 2023-10-04 RX ADMIN — OXYCODONE HYDROCHLORIDE 10 MILLIGRAM(S): 5 TABLET ORAL at 02:30

## 2023-10-04 RX ADMIN — ATORVASTATIN CALCIUM 40 MILLIGRAM(S): 80 TABLET, FILM COATED ORAL at 21:06

## 2023-10-04 RX ADMIN — HYDROMORPHONE HYDROCHLORIDE 1 MILLIGRAM(S): 2 INJECTION INTRAMUSCULAR; INTRAVENOUS; SUBCUTANEOUS at 04:00

## 2023-10-04 RX ADMIN — Medication 2000 MILLIGRAM(S): at 21:05

## 2023-10-04 RX ADMIN — HEPARIN SODIUM 5000 UNIT(S): 5000 INJECTION INTRAVENOUS; SUBCUTANEOUS at 05:21

## 2023-10-04 RX ADMIN — SENNA PLUS 2 TABLET(S): 8.6 TABLET ORAL at 21:06

## 2023-10-04 RX ADMIN — CARVEDILOL PHOSPHATE 6.25 MILLIGRAM(S): 80 CAPSULE, EXTENDED RELEASE ORAL at 16:19

## 2023-10-04 NOTE — PROGRESS NOTE ADULT - SUBJECTIVE AND OBJECTIVE BOX
HOSPITALIST PROGRESS NOTE    JONATHAN GIBSON  [id number]  63yMale    Patient is a 63y old  Male who presents with a chief complaint of Intractable N/V, SANTOS (04 Oct 2023 13:46)      SUBJECTIVE:   Chart reviewed since last visit.  Patient seen and examined at bedside for Staphylococcus epidermidis MVE, ileus vs SBO, SANTOS, pAF, HTN.  Feels good today  Denies any fever, chills, chest pain, dyspnea.  Denies any nausea, vomiting, abdominal pain. Had BM      OBJECTIVE:  Vital Signs Last 24 Hrs  T(C): 36.4 (04 Oct 2023 13:18), Max: 36.6 (03 Oct 2023 22:10)  T(F): 97.5 (04 Oct 2023 13:18), Max: 97.9 (03 Oct 2023 22:10)  HR: 67 (04 Oct 2023 13:18) (67 - 77)  BP: 128/66 (04 Oct 2023 13:18) (128/66 - 154/77)   RR: 18 (04 Oct 2023 13:18) (17 - 18)  SpO2: 91% (04 Oct 2023 13:18) (90% - 94%)    Parameters below as of 04 Oct 2023 13:18  Patient On (Oxygen Delivery Method): room air        PHYSICAL EXAMINATION  General: Middle aged male lying in bed, comfortable  HEENT:  AT NC EOMI  NECK:  Supple  CVS: Midline scar well healed. regular rate and rhythm S1 S2  RESP:  Fair air entry  GI:  Soft nondistended nontender BS+  : No suprapubic tenderness  MSK:  FROM  CNS:  AOx3. No gross focal or global deficit appreciated  INTEG:  warm dry skin  PSYCH:  Fair mood    MONITOR:  CAPILLARY BLOOD GLUCOSE            I&O's Summary    03 Oct 2023 07:01  -  04 Oct 2023 07:00  --------------------------------------------------------  IN: 0 mL / OUT: 675 mL / NET: -675 mL                            10.4   16.24 )-----------( 147      ( 04 Oct 2023 06:37 )             33.3       10-04    146<H>  |  106  |  35.9<H>  ----------------------------<  107<H>  4.0   |  31.0<H>  |  1.62<H>    Ca    8.6      04 Oct 2023 06:37  Phos  2.9     10-03  Mg     2.2     10-04    TPro  6.4<L>  /  Alb  2.4<L>  /  TBili  0.4  /  DBili  x   /  AST  30  /  ALT  <5  /  AlkPhos  134<H>  10-04        Urinalysis Basic - ( 04 Oct 2023 06:37 )    Color: x / Appearance: x / SG: x / pH: x  Gluc: 107 mg/dL / Ketone: x  / Bili: x / Urobili: x   Blood: x / Protein: x / Nitrite: x   Leuk Esterase: x / RBC: x / WBC x   Sq Epi: x / Non Sq Epi: x / Bacteria: x        Culture:    TTE:    RADIOLOGY        MEDICATIONS  (STANDING):  amLODIPine   Tablet 10 milliGRAM(s) Oral daily  atorvastatin 40 milliGRAM(s) Oral at bedtime  carvedilol 6.25 milliGRAM(s) Oral every 12 hours  ceFAZolin  Injectable. 2000 milliGRAM(s) IV Push every 8 hours  dextrose 5% + sodium chloride 0.45% with potassium chloride 10 mEq/L 1000 milliLiter(s) (75 mL/Hr) IV Continuous <Continuous>  heparin   Injectable 5000 Unit(s) SubCutaneous every 8 hours  levETIRAcetam 1000 milliGRAM(s) Oral two times a day  polyethylene glycol 3350 17 Gram(s) Oral daily  senna 2 Tablet(s) Oral at bedtime  tamsulosin 0.4 milliGRAM(s) Oral at bedtime      MEDICATIONS  (PRN):  acetaminophen     Tablet .. 650 milliGRAM(s) Oral every 6 hours PRN Temp greater or equal to 38C (100.4F), Mild Pain (1 - 3)  aluminum hydroxide/magnesium hydroxide/simethicone Suspension 30 milliLiter(s) Oral every 4 hours PRN Dyspepsia  HYDROmorphone  Injectable 1 milliGRAM(s) IV Push every 4 hours PRN breakthru pain  or prior o dressing changes  melatonin 3 milliGRAM(s) Oral at bedtime PRN Insomnia  ondansetron Injectable 4 milliGRAM(s) IV Push every 8 hours PRN Nausea and/or Vomiting  oxyCODONE    IR 10 milliGRAM(s) Oral every 4 hours PRN Severe Pain (7 - 10)  oxyCODONE    IR 5 milliGRAM(s) Oral every 4 hours PRN Moderate Pain (4 - 6)

## 2023-10-04 NOTE — PROGRESS NOTE ADULT - SUBJECTIVE AND OBJECTIVE BOX
INTERVAL HPI/OVERNIGHT EVENTS:    Pt received NGT for ileus visualized on CT with no discrete transition point noted. NGT with ~700 cc ouput overnight. Pt currently denies abdominal pain and states he is passing flatus with small BM. NGT was removed due to malfunction and not replaced due to improved exam.     MEDICATIONS  (STANDING):  amLODIPine   Tablet 10 milliGRAM(s) Oral daily  atorvastatin 40 milliGRAM(s) Oral at bedtime  carvedilol 6.25 milliGRAM(s) Oral every 12 hours  ceFAZolin  Injectable. 2000 milliGRAM(s) IV Push every 8 hours  dextrose 5% + sodium chloride 0.45% with potassium chloride 10 mEq/L 1000 milliLiter(s) (75 mL/Hr) IV Continuous <Continuous>  heparin   Injectable 5000 Unit(s) SubCutaneous every 8 hours  levETIRAcetam 1000 milliGRAM(s) Oral two times a day  polyethylene glycol 3350 17 Gram(s) Oral daily  senna 2 Tablet(s) Oral at bedtime  tamsulosin 0.4 milliGRAM(s) Oral at bedtime    MEDICATIONS  (PRN):  acetaminophen     Tablet .. 650 milliGRAM(s) Oral every 6 hours PRN Temp greater or equal to 38C (100.4F), Mild Pain (1 - 3)  aluminum hydroxide/magnesium hydroxide/simethicone Suspension 30 milliLiter(s) Oral every 4 hours PRN Dyspepsia  HYDROmorphone  Injectable 1 milliGRAM(s) IV Push every 4 hours PRN breakthru pain  or prior o dressing changes  melatonin 3 milliGRAM(s) Oral at bedtime PRN Insomnia  ondansetron Injectable 4 milliGRAM(s) IV Push every 8 hours PRN Nausea and/or Vomiting  oxyCODONE    IR 5 milliGRAM(s) Oral every 4 hours PRN Moderate Pain (4 - 6)  oxyCODONE    IR 10 milliGRAM(s) Oral every 4 hours PRN Severe Pain (7 - 10)      Vital Signs Last 24 Hrs  T(C): 36.4 (04 Oct 2023 13:18), Max: 36.6 (03 Oct 2023 22:10)  T(F): 97.5 (04 Oct 2023 13:18), Max: 97.9 (03 Oct 2023 22:10)  HR: 67 (04 Oct 2023 13:18) (67 - 77)  BP: 128/66 (04 Oct 2023 13:18) (128/66 - 154/77)  BP(mean): --  RR: 18 (04 Oct 2023 13:18) (17 - 18)  SpO2: 91% (04 Oct 2023 13:18) (90% - 94%)    Parameters below as of 04 Oct 2023 13:18  Patient On (Oxygen Delivery Method): room air      Physical Exam:    Neurological:  Alert and oriented x 3. No sensory/motor deficits    HEENT: PERRLA, EOMI, no drainage or redness    Respiratory: Breath Sounds equal & clear to auscultation, no accessory muscle use    Cardiovascular: Regular rate & rhythm, normal S1, S2; no murmurs, gallops or rubs    Gastrointestinal: Softly distended with no rigidity, no rebound, no guarding. Abdomen is non-tender    Extremities: No peripheral edema, No cyanosis, clubbing     Vascular: Equal and normal pulses: 2+ peripheral pulses throughout        I&O's Detail    03 Oct 2023 07:01  -  04 Oct 2023 07:00  --------------------------------------------------------  IN:  Total IN: 0 mL    OUT:    Nasogastric/Oral tube (mL): 675 mL  Total OUT: 675 mL    Total NET: -675 mL          LABS:                        10.4   16.24 )-----------( 147      ( 04 Oct 2023 06:37 )             33.3     10-04    146<H>  |  106  |  35.9<H>  ----------------------------<  107<H>  4.0   |  31.0<H>  |  1.62<H>    Ca    8.6      04 Oct 2023 06:37  Phos  2.9     10-03  Mg     2.2     10-04    TPro  6.4<L>  /  Alb  2.4<L>  /  TBili  0.4  /  DBili  x   /  AST  30  /  ALT  <5  /  AlkPhos  134<H>  10-04      Urinalysis Basic - ( 04 Oct 2023 06:37 )    Color: x / Appearance: x / SG: x / pH: x  Gluc: 107 mg/dL / Ketone: x  / Bili: x / Urobili: x   Blood: x / Protein: x / Nitrite: x   Leuk Esterase: x / RBC: x / WBC x   Sq Epi: x / Non Sq Epi: x / Bacteria: x        RADIOLOGY & ADDITIONAL STUDIES:

## 2023-10-04 NOTE — PROGRESS NOTE ADULT - SUBJECTIVE AND OBJECTIVE BOX
INTERVAL HPI/OVERNIGHT EVENTS:    Patient evaluated at bedside. No acute distress. Status post graft excision, wound being managed by vascular surgery, he developed a ileus post op, he is now passing flatus, no bowel movements, CT abdomen overnight showed early vs partial SBO, possible ileus.      MEDICATIONS  (STANDING):  amLODIPine   Tablet 10 milliGRAM(s) Oral daily  atorvastatin 40 milliGRAM(s) Oral at bedtime  carvedilol 6.25 milliGRAM(s) Oral every 12 hours  ceFAZolin  Injectable. 2000 milliGRAM(s) IV Push every 8 hours  heparin   Injectable 5000 Unit(s) SubCutaneous every 8 hours  levETIRAcetam 1000 milliGRAM(s) Oral two times a day  polyethylene glycol 3350 17 Gram(s) Oral daily  senna 2 Tablet(s) Oral at bedtime  sodium chloride 0.9%. 1000 milliLiter(s) (65 mL/Hr) IV Continuous <Continuous>  tamsulosin 0.4 milliGRAM(s) Oral at bedtime    MEDICATIONS  (PRN):  acetaminophen     Tablet .. 650 milliGRAM(s) Oral every 6 hours PRN Temp greater or equal to 38C (100.4F), Mild Pain (1 - 3)  aluminum hydroxide/magnesium hydroxide/simethicone Suspension 30 milliLiter(s) Oral every 4 hours PRN Dyspepsia  HYDROmorphone  Injectable 1 milliGRAM(s) IV Push every 4 hours PRN breakthru pain  or prior o dressing changes  melatonin 3 milliGRAM(s) Oral at bedtime PRN Insomnia  ondansetron Injectable 4 milliGRAM(s) IV Push every 8 hours PRN Nausea and/or Vomiting  oxyCODONE    IR 10 milliGRAM(s) Oral every 4 hours PRN Severe Pain (7 - 10)  oxyCODONE    IR 5 milliGRAM(s) Oral every 4 hours PRN Moderate Pain (4 - 6)      Vital Signs Last 24 Hrs  T(C): 36.4 (04 Oct 2023 04:28), Max: 36.9 (03 Oct 2023 09:26)  T(F): 97.5 (04 Oct 2023 04:28), Max: 98.4 (03 Oct 2023 09:26)  HR: 77 (04 Oct 2023 04:28) (68 - 85)  BP: 130/61 (04 Oct 2023 04:28) (130/61 - 165/75)  BP(mean): --  RR: 18 (04 Oct 2023 04:28) (18 - 18)  SpO2: 92% (04 Oct 2023 04:28) (90% - 94%)    Parameters below as of 04 Oct 2023 04:28  Patient On (Oxygen Delivery Method): room air        Constitutional: NAD  Respiratory: Breath Sounds equal & clear to percussion & auscultation, no accessory muscle use  Cardiovascular: Regular rate & rhythm, normal S1, S2; no murmurs, gallops or rubs; no S3, S4  Gastrointestinal: Soft, non-tender, no hepatosplenomegaly, normal bowel sounds  Extremities: X2 wounds packed with packing, which we are removing and cutting some packing daily.  No purulent drainage, improving overlying erythema.     Vascular: Left upper extremity ulnar signals      I&O's Detail    03 Oct 2023 07:01  -  04 Oct 2023 07:00  --------------------------------------------------------  IN:  Total IN: 0 mL    OUT:    Nasogastric/Oral tube (mL): 675 mL  Total OUT: 675 mL    Total NET: -675 mL          LABS:                        11.6   25.01 )-----------( 172      ( 03 Oct 2023 09:52 )             35.8     10-03    142  |  100  |  42.0<H>  ----------------------------<  138<H>  3.8   |  31.0<H>  |  1.59<H>    Ca    8.6      03 Oct 2023 09:52  Phos  2.9     10-03  Mg     2.0     10-03        Urinalysis Basic - ( 03 Oct 2023 09:52 )    Color: x / Appearance: x / SG: x / pH: x  Gluc: 138 mg/dL / Ketone: x  / Bili: x / Urobili: x   Blood: x / Protein: x / Nitrite: x   Leuk Esterase: x / RBC: x / WBC x   Sq Epi: x / Non Sq Epi: x / Bacteria: x        RADIOLOGY & ADDITIONAL STUDIES:

## 2023-10-04 NOTE — PROGRESS NOTE ADULT - ASSESSMENT
62 y/o M w/ PMH of HTN, HLD, Type A dissection w/ bowel ischemia requiring bowel resection w/ repeat sternotomy, and CABG x2, CHF (mixed systolic/diastolic), MV and AV replacement, CKD III (with short term on HD 2022), AF s/p ablation pending watchman, SDH (resolved as of July imaging) who presented to [facility] for 2wks of inability to tolerate PO intake.  Labs consistent w/ SANTOS on CKD.  Hospital course complicated by bacteremia w/ cxs growing staph epidermidus and TTE showing large MV endocarditis. Vascular consulted with concern of Infected prosthetic vascular graft,  Patient is now s/p removal of L AV graft. Was monitor in SICU and then transfer to floor. Vascular requesting transfer back to medicine service. Patient hospital course now complicated by Ileus vs SBO. General surgery was consulted. NGT was placed and now pending CT a/p.     Sepsis POA 2/2 Staphylococcus epidermidis bacteremia likely endovascular in origin w/ subsequent MV IE   - Repeat BCXs from 09/27 NGTD  - LUE AV graft concerning for infectious source of bacteremia leading to IE now s/p removal on 10/1 by vascular  - Has MV/AV replacements which increases Pts risk of IE in the setting of staph epi bacteremia    - s/p MUSTAPHA w/ evidence of large MV vegitation   - c/w cefazolin as per ID recs   - Due to renal failure will hold off on Gentamicin  - Due to Eliquis unable to use Rifampin   - Plan for Repeat MUSTAPHA on or after 10/24/23 per ID  - CT A/P redemonstrates aortic dissection stable from 05/2023 but no acute findings reported   - CTSx consulted for consideration of re-operation of MVR   - As per CTSx will hold off on surgery for now and on contrast studies given pt high surgical risk and risk of progressing to HD again   - If cxs remain negative can tx w/ 4wks of abxs repeat MUSTAPHA to see if vegetation improving as per CTSx  - Monitor CBC and temperature    Ileus vs SBO  - NGT to LCWS  - gen surg consulted  - pending CT a/p   - NPO    - gentle fluids for 12 hours given hx of CHF    SANTOS on CKD III likely prerenal azotemia 2/2 GI losses and poor po intake  - Cr improving since admission (baseline 1.5-1.8)  - Required short term HD in recent past   - will stop sodium bicarb as serum bicarb elevated  - Continue to hold of on diuretic given hypovolemia   - Avoid nephrotoxic meds or renally dose if needed   - Nephrology following and recs noted     Chronic HFpEF  - Follows Dr. Frankel   - c/w Coreg  - Torsemide held in light of dehydration on admission resume on d/c or sooner if needed  - MUSTAPHA w/ LVEF 55-60%  - Monitor daily weights and I/O's  - Cardio following and recs noted    paroxysmal Atrial fibrillation   - Rate controlled currently   - CHADSVASC of 5 and as per cardiology no need to bridge   - c/w BB for rate control  - Briefly held AC when had SDH but since resumed after 07/2023 due CTH w/ complete resolution of SDH  - AC currently on hold due to recent procedure and now in light of possible SBO will hold in case needs surgical intervention.   - will need to resume AC once SBO resolves    HTN  - c/w amlodipine and carvedilol     Troponemia   - Likely from poor clearance in the setting of SANTOS on CKD  - Plateaued w/ no EKG changes   - No chest pain     SDH   - CTH has shown complete resolution since July 2023    Mitral/Aortic valve replacement   - Recent MUSTAPHA shows bioprosthetic valves with only moderate paravalvular leak in MV  - Now w/ large vegetation on MV  - CTSx following but no plan for surg intervention at this time      VTE ppx: HSQ  Dispo: remains acute,

## 2023-10-04 NOTE — PROGRESS NOTE ADULT - ASSESSMENT
Assessment: Patient is a 63 year-old male with PMH HTN, HLD, CHF, CKD, Type A aortic dissection, and atrial fibrillation s/p ablation; PSH sternotomy, CABG x 2, MVR, AVR, L AVG; who presented to the ED on 9/23/23 with intractable nausea and vomiting. 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 Patient is now POD 3 from removal of L AV graft in presence of infection and bacteremia. Patient reported nausea and vomiting that has been intractable which has resolved, he has a NG tube in place, drained 675 overnight, he is passing flatus. ACS is following and assisting with management of ileus vs early/partial SBO.      Plan:  - NGT in place  - F/U ACS recommendations    - General surgery consult ordered for concern of SBO, appreciate recommendations   - SQH q8h as DVT prophylaxis  - Continue cefazolin 2g IVP q8h as per infectious disease for endocarditis  - q4h neurovascular checks  - Encourage ambulation and incentive spirometry

## 2023-10-04 NOTE — PROGRESS NOTE ADULT - ASSESSMENT
64 yo male with multiple medical comorbidities, most recently with Staph epi bacteremia/endocarditis, s/p L AV graft explantation, now with nausea, vomiting and abdominal distention, concerning for ileus vs. small bowel obstruction.     - NGT decompression overnight with ~ 700 cc of output  - Pt states is passing flatus with small BM  - Pt's NGT malfunctioned this AM and unable to suction NGT and cannot flush blue port  - Pt's abdomen is softly distended at this time and abdominal pain has resolved  - Pt denies nausea/vomiting: Will hold off on replacing NGT  - Encourage ambulation, OOB to chair  - Will replace NGT if symptoms reoccur

## 2023-10-05 LAB
ALBUMIN SERPL ELPH-MCNC: 2.4 G/DL — LOW (ref 3.3–5.2)
ALP SERPL-CCNC: 110 U/L — SIGNIFICANT CHANGE UP (ref 40–120)
ALT FLD-CCNC: <5 U/L — SIGNIFICANT CHANGE UP
ANION GAP SERPL CALC-SCNC: 10 MMOL/L — SIGNIFICANT CHANGE UP (ref 5–17)
AST SERPL-CCNC: 22 U/L — SIGNIFICANT CHANGE UP
BILIRUB SERPL-MCNC: 0.5 MG/DL — SIGNIFICANT CHANGE UP (ref 0.4–2)
BUN SERPL-MCNC: 35.9 MG/DL — HIGH (ref 8–20)
CALCIUM SERPL-MCNC: 8.6 MG/DL — SIGNIFICANT CHANGE UP (ref 8.4–10.5)
CHLORIDE SERPL-SCNC: 108 MMOL/L — SIGNIFICANT CHANGE UP (ref 96–108)
CO2 SERPL-SCNC: 28 MMOL/L — SIGNIFICANT CHANGE UP (ref 22–29)
CREAT SERPL-MCNC: 1.78 MG/DL — HIGH (ref 0.5–1.3)
EGFR: 43 ML/MIN/1.73M2 — LOW
GLUCOSE BLDC GLUCOMTR-MCNC: 116 MG/DL — HIGH (ref 70–99)
GLUCOSE BLDC GLUCOMTR-MCNC: 117 MG/DL — HIGH (ref 70–99)
GLUCOSE SERPL-MCNC: 114 MG/DL — HIGH (ref 70–99)
HCT VFR BLD CALC: 33.5 % — LOW (ref 39–50)
HGB BLD-MCNC: 10.2 G/DL — LOW (ref 13–17)
MCHC RBC-ENTMCNC: 27.9 PG — SIGNIFICANT CHANGE UP (ref 27–34)
MCHC RBC-ENTMCNC: 30.4 GM/DL — LOW (ref 32–36)
MCV RBC AUTO: 91.8 FL — SIGNIFICANT CHANGE UP (ref 80–100)
PLATELET # BLD AUTO: 138 K/UL — LOW (ref 150–400)
POTASSIUM SERPL-MCNC: 4.1 MMOL/L — SIGNIFICANT CHANGE UP (ref 3.5–5.3)
POTASSIUM SERPL-SCNC: 4.1 MMOL/L — SIGNIFICANT CHANGE UP (ref 3.5–5.3)
PROT SERPL-MCNC: 6.3 G/DL — LOW (ref 6.6–8.7)
RBC # BLD: 3.65 M/UL — LOW (ref 4.2–5.8)
RBC # FLD: 13.9 % — SIGNIFICANT CHANGE UP (ref 10.3–14.5)
SODIUM SERPL-SCNC: 146 MMOL/L — HIGH (ref 135–145)
WBC # BLD: 7.03 K/UL — SIGNIFICANT CHANGE UP (ref 3.8–10.5)
WBC # FLD AUTO: 7.03 K/UL — SIGNIFICANT CHANGE UP (ref 3.8–10.5)

## 2023-10-05 PROCEDURE — 99233 SBSQ HOSP IP/OBS HIGH 50: CPT

## 2023-10-05 RX ORDER — LEVETIRACETAM 250 MG/1
1000 TABLET, FILM COATED ORAL EVERY 12 HOURS
Refills: 0 | Status: DISCONTINUED | OUTPATIENT
Start: 2023-10-05 | End: 2023-10-13

## 2023-10-05 RX ORDER — ONDANSETRON 8 MG/1
4 TABLET, FILM COATED ORAL ONCE
Refills: 0 | Status: COMPLETED | OUTPATIENT
Start: 2023-10-05 | End: 2023-10-05

## 2023-10-05 RX ADMIN — HEPARIN SODIUM 5000 UNIT(S): 5000 INJECTION INTRAVENOUS; SUBCUTANEOUS at 06:55

## 2023-10-05 RX ADMIN — ATORVASTATIN CALCIUM 40 MILLIGRAM(S): 80 TABLET, FILM COATED ORAL at 22:58

## 2023-10-05 RX ADMIN — Medication 2000 MILLIGRAM(S): at 20:15

## 2023-10-05 RX ADMIN — DEXTROSE MONOHYDRATE, SODIUM CHLORIDE, AND POTASSIUM CHLORIDE 75 MILLILITER(S): 50; .745; 4.5 INJECTION, SOLUTION INTRAVENOUS at 03:14

## 2023-10-05 RX ADMIN — Medication 2000 MILLIGRAM(S): at 05:27

## 2023-10-05 RX ADMIN — HEPARIN SODIUM 5000 UNIT(S): 5000 INJECTION INTRAVENOUS; SUBCUTANEOUS at 21:42

## 2023-10-05 RX ADMIN — HEPARIN SODIUM 5000 UNIT(S): 5000 INJECTION INTRAVENOUS; SUBCUTANEOUS at 13:27

## 2023-10-05 RX ADMIN — ONDANSETRON 4 MILLIGRAM(S): 8 TABLET, FILM COATED ORAL at 06:04

## 2023-10-05 RX ADMIN — Medication 2000 MILLIGRAM(S): at 11:15

## 2023-10-05 RX ADMIN — OXYCODONE HYDROCHLORIDE 10 MILLIGRAM(S): 5 TABLET ORAL at 01:26

## 2023-10-05 RX ADMIN — ONDANSETRON 4 MILLIGRAM(S): 8 TABLET, FILM COATED ORAL at 16:01

## 2023-10-05 RX ADMIN — LEVETIRACETAM 400 MILLIGRAM(S): 250 TABLET, FILM COATED ORAL at 18:47

## 2023-10-05 RX ADMIN — TAMSULOSIN HYDROCHLORIDE 0.4 MILLIGRAM(S): 0.4 CAPSULE ORAL at 22:58

## 2023-10-05 RX ADMIN — ONDANSETRON 4 MILLIGRAM(S): 8 TABLET, FILM COATED ORAL at 01:26

## 2023-10-05 RX ADMIN — OXYCODONE HYDROCHLORIDE 10 MILLIGRAM(S): 5 TABLET ORAL at 02:26

## 2023-10-05 NOTE — PROGRESS NOTE ADULT - SUBJECTIVE AND OBJECTIVE BOX
SUBJECTIVE / 24H EVENTS:  Patient was seen and examined at bedside. NGT was removed yesterday due to malfunction and not replaced due to improved exam. Patient was spitting up a lot of phlegm this morning but no emesis. Patient states abdominal pain, +bowel movements, +flatus. Denies N/V, F, chills, SOB, CP.      MEDICATIONS  (STANDING):  amLODIPine   Tablet 10 milliGRAM(s) Oral daily  atorvastatin 40 milliGRAM(s) Oral at bedtime  carvedilol 6.25 milliGRAM(s) Oral every 12 hours  ceFAZolin  Injectable. 2000 milliGRAM(s) IV Push every 8 hours  dextrose 5% + sodium chloride 0.45% with potassium chloride 10 mEq/L 1000 milliLiter(s) (75 mL/Hr) IV Continuous <Continuous>  heparin   Injectable 5000 Unit(s) SubCutaneous every 8 hours  levETIRAcetam 1000 milliGRAM(s) Oral two times a day  polyethylene glycol 3350 17 Gram(s) Oral daily  senna 2 Tablet(s) Oral at bedtime  tamsulosin 0.4 milliGRAM(s) Oral at bedtime    MEDICATIONS  (PRN):  acetaminophen     Tablet .. 650 milliGRAM(s) Oral every 6 hours PRN Temp greater or equal to 38C (100.4F), Mild Pain (1 - 3)  aluminum hydroxide/magnesium hydroxide/simethicone Suspension 30 milliLiter(s) Oral every 4 hours PRN Dyspepsia  HYDROmorphone  Injectable 1 milliGRAM(s) IV Push every 4 hours PRN breakthru pain  or prior o dressing changes  melatonin 3 milliGRAM(s) Oral at bedtime PRN Insomnia  ondansetron Injectable 4 milliGRAM(s) IV Push every 8 hours PRN Nausea and/or Vomiting  oxyCODONE    IR 5 milliGRAM(s) Oral every 4 hours PRN Moderate Pain (4 - 6)  oxyCODONE    IR 10 milliGRAM(s) Oral every 4 hours PRN Severe Pain (7 - 10)      Vital Signs Last 24 Hrs  T(C): 36.5 (05 Oct 2023 13:02), Max: 36.8 (04 Oct 2023 21:46)  T(F): 97.7 (05 Oct 2023 13:02), Max: 98.3 (05 Oct 2023 00:50)  HR: 63 (05 Oct 2023 13:02) (63 - 65)  BP: 135/65 (05 Oct 2023 13:02) (122/66 - 153/66)  BP(mean): --  RR: 18 (05 Oct 2023 13:02) (17 - 18)  SpO2: 95% (05 Oct 2023 13:02) (91% - 96%)    Parameters below as of 05 Oct 2023 13:02  Patient On (Oxygen Delivery Method): nasal cannula  O2 Flow (L/min): 2      Constitutional: patient resting in bed  HEENT: head normocephalic and atraumatic, PERRL b/l, no blood in nares or oral cavity  Respiratory: respirations are unlabored, no accessory muscle use, no conversational dyspnea  Cardiovascular: regular rate & rhythm  Gastrointestinal: Abdomen soft, tender to palpation, distended, rebound tenderness  Neurological: GCS: 15, A&O x 3; no gross sensory / motor / coordination deficits        I&O's Detail      LABS:                        10.2   7.03  )-----------( 138      ( 05 Oct 2023 06:15 )             33.5     10-05    146<H>  |  108  |  35.9<H>  ----------------------------<  114<H>  4.1   |  28.0  |  1.78<H>    Ca    8.6      05 Oct 2023 06:15  Mg     2.2     10-04    TPro  6.3<L>  /  Alb  2.4<L>  /  TBili  0.5  /  DBili  x   /  AST  22  /  ALT  <5  /  AlkPhos  110  10-05      Urinalysis Basic - ( 05 Oct 2023 06:15 )    Color: x / Appearance: x / SG: x / pH: x  Gluc: 114 mg/dL / Ketone: x  / Bili: x / Urobili: x   Blood: x / Protein: x / Nitrite: x   Leuk Esterase: x / RBC: x / WBC x   Sq Epi: x / Non Sq Epi: x / Bacteria: x

## 2023-10-05 NOTE — PROGRESS NOTE ADULT - ASSESSMENT
Patient is a 63 year-old male with PMH HTN, HLD, CHF, CKD, Type A aortic dissection, and atrial fibrillation. Patient is now POD 4  from removal of L AV graft in presence of infection and bacteremia. Post-op course was complicated by an ileus. Patient denies vomiting but states he has been spitting up phlegm throughout the night. NG tube has since been removed.  ACS is following and assisting with management of ileus vs early/partial SBO.        Plan:  - F/U ACS recommendations    - SQH q8h as DVT prophylaxis  - Continue cefazolin 2g IVP q8h as per infectious disease for endocarditis  - Continue local wound care and daily packing removal.  Patient is a 63 year-old male with PMH HTN, HLD, CHF, CKD, Type A aortic dissection, and atrial fibrillation. Patient is now POD 4  from removal of L AV graft in presence of infection and bacteremia. Post-op course was complicated by an ileus. Patient denies vomiting but states he has been spitting up phlegm throughout the night. NG tube has since been removed.  ACS is following and assisting with management of ileus vs early/partial SBO.        Plan:  - F/U ACS recommendations    - SQH q8h as DVT prophylaxis  - Continue cefazolin 2g IVP q8h as per infectious disease for endocarditis  - Will Continue local wound care and daily packing removal....  to be done with Dr chao

## 2023-10-05 NOTE — PROGRESS NOTE ADULT - SUBJECTIVE AND OBJECTIVE BOX
NEPHROLOGY INTERVAL HPI/OVERNIGHT EVENTS:  no new events.  MEDICATIONS  (STANDING):  amLODIPine   Tablet 10 milliGRAM(s) Oral daily  atorvastatin 40 milliGRAM(s) Oral at bedtime  carvedilol 6.25 milliGRAM(s) Oral every 12 hours  ceFAZolin  Injectable. 2000 milliGRAM(s) IV Push every 8 hours  heparin   Injectable 5000 Unit(s) SubCutaneous every 8 hours  levETIRAcetam 1000 milliGRAM(s) Oral two times a day  polyethylene glycol 3350 17 Gram(s) Oral daily  potassium chloride  10 mEq/100 mL IVPB 10 milliEquivalent(s) IV Intermittent every 1 hour  senna 2 Tablet(s) Oral at bedtime  sodium chloride 0.9%. 1000 milliLiter(s) (65 mL/Hr) IV Continuous <Continuous>  tamsulosin 0.4 milliGRAM(s) Oral at bedtime    MEDICATIONS  (PRN):  acetaminophen     Tablet .. 650 milliGRAM(s) Oral every 6 hours PRN Temp greater or equal to 38C (100.4F), Mild Pain (1 - 3)  aluminum hydroxide/magnesium hydroxide/simethicone Suspension 30 milliLiter(s) Oral every 4 hours PRN Dyspepsia  HYDROmorphone  Injectable 1 milliGRAM(s) IV Push every 4 hours PRN breakthru pain  or prior o dressing changes  melatonin 3 milliGRAM(s) Oral at bedtime PRN Insomnia  ondansetron Injectable 4 milliGRAM(s) IV Push every 8 hours PRN Nausea and/or Vomiting  oxyCODONE    IR 5 milliGRAM(s) Oral every 4 hours PRN Moderate Pain (4 - 6)  oxyCODONE    IR 10 milliGRAM(s) Oral every 4 hours PRN Severe Pain (7 - 10)      Allergies    penicillin (Other)        Vital Signs Last 24 Hrs  T(C): 36.5 (05 Oct 2023 04:30), Max: 36.8 (04 Oct 2023 21:46)  T(F): 97.7 (05 Oct 2023 04:30), Max: 98.3 (05 Oct 2023 00:50)  HR: 63 (05 Oct 2023 04:30) (63 - 75)  BP: 143/70 (05 Oct 2023 04:30) (122/66 - 152/64)  BP(mean): --  RR: 18 (05 Oct 2023 04:30) (17 - 18)  SpO2: 96% (05 Oct 2023 04:30) (90% - 96%)    Parameters below as of 05 Oct 2023 04:30  Patient On (Oxygen Delivery Method): nasal cannula  O2 Flow (L/min): 2    T(C): 36.5 (03 Oct 2023 13:34), Max: 37 (03 Oct 2023 02:45)  T(F): 97.7 (03 Oct 2023 13:34), Max: 98.6 (03 Oct 2023 02:45)  HR: 79 (03 Oct 2023 13:34) (67 - 85)  BP: 165/75 (03 Oct 2023 13:34) (133/63 - 165/75)  BP(mean): --  RR: 18 (03 Oct 2023 13:34) (17 - 18)  SpO2: 91% (03 Oct 2023 13:34) (91% - 96%)    Parameters below as of 03 Oct 2023 13:34  Patient On (Oxygen Delivery Method): room air      PHYSICAL EXAM:    GENERAL: Comfortable  in bed  EYES: EOMI  NECK: Supple  NERVOUS SYSTEM:  Alert, verbal  CHEST:  No rales   HEART:  RRR, No murmur  ABDOMEN: Soft, NT/ND BS+  EXTREMITIES:  No Edema    LABS:    10-05    146<H>  |  108  |  35.9<H>  ----------------------------<  114<H>  4.1   |  28.0  |  1.78<H>    Ca    8.6      05 Oct 2023 06:15  Phos  2.9     10-03  Mg     2.2     10-04    TPro  6.3<L>  /  Alb  2.4<L>  /  TBili  0.5  /  DBili  x   /  AST  22  /  ALT  <5  /  AlkPhos  110  10-05                          11.6   25.01 )-----------( 172      ( 03 Oct 2023 09:52 )             35.8     10-03    142  |  100  |  42.0<H>  ----------------------------<  138<H>  3.8   |  31.0<H>  |  1.59<H>    Ca    8.6      03 Oct 2023 09:52  Phos  2.9     10-03  Mg     2.0     10-03    TPro  6.1<L>  /  Alb  2.1<L>  /  TBili  0.3<L>  /  DBili  x   /  AST  32  /  ALT  <5  /  AlkPhos  143<H>  10-02      Urinalysis Basic - ( 03 Oct 2023 09:52 )    Color: x / Appearance: x / SG: x / pH: x  Gluc: 138 mg/dL / Ketone: x  / Bili: x / Urobili: x   Blood: x / Protein: x / Nitrite: x   Leuk Esterase: x / RBC: x / WBC x   Sq Epi: x / Non Sq Epi: x / Bacteria: x      Magnesium: 2.0 mg/dL (10-03 @ 09:52)  Phosphorus: 2.9 mg/dL (10-03 @ 09:52)          RADIOLOGY & ADDITIONAL TESTS:

## 2023-10-05 NOTE — PROGRESS NOTE ADULT - SUBJECTIVE AND OBJECTIVE BOX
[facility] Division of Hospital Medicine    Chief Complaint:  N/V, SANTOS     SUBJECTIVE / OVERNIGHT EVENTS:  Pt reports he feels better  No abd pain   Passing gas and +BM     Patient denies chest pain, SOB, N/V, fever, chills, dysuria or any other complaints. All remainder ROS negative.     MEDICATIONS  (STANDING):  amLODIPine   Tablet 10 milliGRAM(s) Oral daily  atorvastatin 40 milliGRAM(s) Oral at bedtime  carvedilol 6.25 milliGRAM(s) Oral every 12 hours  ceFAZolin  Injectable. 2000 milliGRAM(s) IV Push every 8 hours  dextrose 5% + sodium chloride 0.45% with potassium chloride 10 mEq/L 1000 milliLiter(s) (75 mL/Hr) IV Continuous <Continuous>  heparin   Injectable 5000 Unit(s) SubCutaneous every 8 hours  levETIRAcetam 1000 milliGRAM(s) Oral two times a day  polyethylene glycol 3350 17 Gram(s) Oral daily  senna 2 Tablet(s) Oral at bedtime  tamsulosin 0.4 milliGRAM(s) Oral at bedtime    MEDICATIONS  (PRN):  acetaminophen     Tablet .. 650 milliGRAM(s) Oral every 6 hours PRN Temp greater or equal to 38C (100.4F), Mild Pain (1 - 3)  aluminum hydroxide/magnesium hydroxide/simethicone Suspension 30 milliLiter(s) Oral every 4 hours PRN Dyspepsia  HYDROmorphone  Injectable 1 milliGRAM(s) IV Push every 4 hours PRN breakthru pain  or prior o dressing changes  melatonin 3 milliGRAM(s) Oral at bedtime PRN Insomnia  ondansetron Injectable 4 milliGRAM(s) IV Push every 8 hours PRN Nausea and/or Vomiting  oxyCODONE    IR 5 milliGRAM(s) Oral every 4 hours PRN Moderate Pain (4 - 6)  oxyCODONE    IR 10 milliGRAM(s) Oral every 4 hours PRN Severe Pain (7 - 10)        I&O's Summary      PHYSICAL EXAM:  Vital Signs Last 24 Hrs  T(C): 36.5 (05 Oct 2023 13:02), Max: 36.8 (04 Oct 2023 21:46)  T(F): 97.7 (05 Oct 2023 13:02), Max: 98.3 (05 Oct 2023 00:50)  HR: 63 (05 Oct 2023 13:02) (63 - 65)  BP: 135/65 (05 Oct 2023 13:02) (122/66 - 153/66)  BP(mean): --  RR: 18 (05 Oct 2023 13:02) (17 - 18)  SpO2: 95% (05 Oct 2023 13:02) (91% - 96%)    Parameters below as of 05 Oct 2023 13:02  Patient On (Oxygen Delivery Method): nasal cannula  O2 Flow (L/min): 2        CONSTITUTIONAL: NAD, sitting up in bed  RESPIRATORY: Normal respiratory effort; lungs are clear to auscultation bilaterally  CARDIOVASCULAR: Regular rate and rhythm, normal S1 and S2   ABDOMEN: Nontender to palpation, normoactive bowel sounds  PSYCH: A+O to person, place, and time; affect appropriate  NEUROLOGY: No gross sensory or motor deficits         LABS:                        10.2   7.03  )-----------( 138      ( 05 Oct 2023 06:15 )             33.5     10-05    146<H>  |  108  |  35.9<H>  ----------------------------<  114<H>  4.1   |  28.0  |  1.78<H>    Ca    8.6      05 Oct 2023 06:15  Mg     2.2     10-04    TPro  6.3<L>  /  Alb  2.4<L>  /  TBili  0.5  /  DBili  x   /  AST  22  /  ALT  <5  /  AlkPhos  110  10-05          Urinalysis Basic - ( 05 Oct 2023 06:15 )    Color: x / Appearance: x / SG: x / pH: x  Gluc: 114 mg/dL / Ketone: x  / Bili: x / Urobili: x   Blood: x / Protein: x / Nitrite: x   Leuk Esterase: x / RBC: x / WBC x   Sq Epi: x / Non Sq Epi: x / Bacteria: x

## 2023-10-05 NOTE — CHART NOTE - NSCHARTNOTEFT_GEN_A_CORE
Source: Patient [ ]  Family [ ]   other [x ]  64 y/o M w/ PMH of HTN, HLD, Type A dissection w/ bowel ischemia requiring bowel resection w/ repeat sternotomy, and CABG x2, CHF (mixed systolic/diastolic), MV and AV replacement, CKD III (with short term on HD 2022), AF s/p ablation pending watchman, SDH (resolved as of July imaging) who presented to [facility] for 2wks of inability to tolerate PO intake.  Labs consistent w/ SANTOS on CKD.  Hospital course complicated by bacteremia w/ cxs growing staph epidermidus and TTE showing large MV endocarditis. Vascular consulted with concern of Infected prosthetic vascular graft,  Patient is now s/p removal of L AV graft. Was monitor in SICU and then transfer to floor. Vascular requesting transfer back to medicine service. Patient hospital course now complicated by Ileus vs SBO. General surgery was consulted. NGT was placed and now removed.     Current Diet: Diet, NPO (10-03-23 @ 05:10)        Current Weight:   10/4 69.8 kg  9/24 77 kg  % Weight Change     Pertinent Medications: MEDICATIONS  (STANDING):  amLODIPine   Tablet 10 milliGRAM(s) Oral daily  atorvastatin 40 milliGRAM(s) Oral at bedtime  carvedilol 6.25 milliGRAM(s) Oral every 12 hours  ceFAZolin  Injectable. 2000 milliGRAM(s) IV Push every 8 hours  dextrose 5% + sodium chloride 0.45% with potassium chloride 10 mEq/L 1000 milliLiter(s) (75 mL/Hr) IV Continuous <Continuous>  heparin   Injectable 5000 Unit(s) SubCutaneous every 8 hours  levETIRAcetam 1000 milliGRAM(s) Oral two times a day  polyethylene glycol 3350 17 Gram(s) Oral daily  senna 2 Tablet(s) Oral at bedtime  tamsulosin 0.4 milliGRAM(s) Oral at bedtime    MEDICATIONS  (PRN):  acetaminophen     Tablet .. 650 milliGRAM(s) Oral every 6 hours PRN Temp greater or equal to 38C (100.4F), Mild Pain (1 - 3)  aluminum hydroxide/magnesium hydroxide/simethicone Suspension 30 milliLiter(s) Oral every 4 hours PRN Dyspepsia  HYDROmorphone  Injectable 1 milliGRAM(s) IV Push every 4 hours PRN breakthru pain  or prior o dressing changes  melatonin 3 milliGRAM(s) Oral at bedtime PRN Insomnia  ondansetron Injectable 4 milliGRAM(s) IV Push every 8 hours PRN Nausea and/or Vomiting  oxyCODONE    IR 5 milliGRAM(s) Oral every 4 hours PRN Moderate Pain (4 - 6)  oxyCODONE    IR 10 milliGRAM(s) Oral every 4 hours PRN Severe Pain (7 - 10)    Pertinent Labs: CBC Full  -  ( 05 Oct 2023 06:15 )  WBC Count : 7.03 K/uL  RBC Count : 3.65 M/uL  Hemoglobin : 10.2 g/dL  Hematocrit : 33.5 %  Platelet Count - Automated : 138 K/uL  Mean Cell Volume : 91.8 fl  Mean Cell Hemoglobin : 27.9 pg  Mean Cell Hemoglobin Concentration : 30.4 gm/dL  Auto Neutrophil # : x  Auto Lymphocyte # : x  Auto Monocyte # : x  Auto Eosinophil # : x  Auto Basophil # : x  Auto Neutrophil % : x  Auto Lymphocyte % : x  Auto Monocyte % : x  Auto Eosinophil % : x  Auto Basophil % : x  10-05 Na146 mmol/L<H> Glu 114 mg/dL<H> K+ 4.1 mmol/L Cr  1.78 mg/dL<H> BUN 35.9 mg/dL<H> Phos n/a   Alb 2.4 g/dL<L> PAB n/a               Skin: sx incision     Nutrition focused physical exam conducted - found signs of malnutrition [ ]absent [ ]present    Subcutaneous fat loss: [ ] Orbital fat pads region, [ ]Buccal fat region, [ ]Triceps region,  [ ]Ribs region    Muscle wasting: [ ]Temples region, [ ]Clavicle region, [ ]Shoulder region, [ ]Scapula region, [ ]Interosseous region,  [ ]thigh region, [ ]Calf region    Estimated Needs:   [ ] no change since previous assessment  [ ] recalculated:     Current Nutrition Diagnosis: Malnutrition...  moderate  related to inadequate energy intake in setting of SANTOS, hx heart failure  as evidenced by meets <75% EER  x 2 weeks, vomiting, 12# wt loss x4 mo, mild muscle/fat depletion.  Pt now NPO.      Recommendations:   1) Resume PO diet when feasible  2) Add ensure TID  3) Daily weight    Monitoring and Evaluation:   [ ] PO intake [ ] Tolerance to diet prescription [X] Weights  [X] Follow up per protocol [X] Labs:

## 2023-10-05 NOTE — PROGRESS NOTE ADULT - ASSESSMENT
62 yo male with multiple medical comorbidities with concerns for ileus vs. small bowel obstruction.   - NGT malfunctioned yesterday and was removed, Will replace NGT if nausea/vomiting reoccur  - Encourage ambulation, OOB to chair   - Continue NPO   -Monitor flatus / BM   - Pain control

## 2023-10-05 NOTE — PROGRESS NOTE ADULT - ASSESSMENT
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  Labs consistent w/ SANTOS on CKD.  Hospital course complicated by bacteremia w/ cxs growing staph epidermidus and TTE showing large MV endocarditis. Vascular consulted with concern of Infected prosthetic vascular graft,  Patient is now s/p removal of L AV graft. Was monitor in SICU and then transfer to floor. Vascular requesting transfer back to medicine service. Patient hospital course now complicated by Ileus vs SBO. General surgery was consulted. NGT was placed and now removed.     Sepsis POA 2/2 Staphylococcus epidermidis bacteremia likely endovascular in origin w/ subsequent MV IE   - Repeat BCXs from 09/27 NGTD  - LUE AV graft concerning for infectious source of bacteremia leading to IE now s/p removal on 10/1 by vascular  - Has MV/AV replacements which increases Pts risk of IE in the setting of staph epi bacteremia    - s/p MUSTAPHA w/ evidence of large MV vegitation   - c/w cefazolin as per ID recs   - Due to renal failure will hold off on Gentamicin  - Due to Eliquis unable to use Rifampin   - Plan for Repeat MUSTAPHA on or after 10/24/23 per ID  - CT A/P redemonstrates aortic dissection stable from 05/2023 but no acute findings reported   - CTSx consulted for consideration of re-operation of MVR   - As per CTSx will hold off on surgery for now and on contrast studies given pt high surgical risk and risk of progressing to HD again   - If cxs remain negative can tx w/ 4wks of abxs repeat MUSTAPHA to see if vegetation improving as per CTSx  - Monitor CBC and temperature    Ileus vs SBO   - NGT now removed   - gen surg consult appreciated   - NPO for now  - gentle IVF     SANTOS on CKD III likely prerenal azotemia 2/2 GI losses and poor po intake  - Cr improving since admission (baseline 1.5-1.8)  - Required short term HD in recent past   - will stop sodium bicarb as serum bicarb elevated  - Continue to hold of on diuretic given hypovolemia   - Avoid nephrotoxic meds or renally dose if needed   - Nephrology following and recs noted     Chronic HFpEF  - Follows Dr. Frankel   - c/w Coreg  - Torsemide held in light of dehydration on admission resume on d/c or sooner if needed  - MUSTAPHA w/ LVEF 55-60%  - Monitor daily weights and I/O's  - Cardio following and recs noted    paroxysmal Atrial fibrillation   - Rate controlled currently   - CHADSVASC of 5 and as per cardiology no need to bridge   - c/w BB for rate control  - Briefly held AC when had SDH but since resumed after 07/2023 due CTH w/ complete resolution of SDH  - AC currently on hold due to recent procedure and now in light of possible SBO will hold in case needs surgical intervention   - will need to resume AC once SBO resolves    HTN  - c/w amlodipine and carvedilol     Troponemia   - Likely from poor clearance in the setting of SANTOS on CKD  - no EKG changes   - No chest pain     SDH   - CTH has shown complete resolution since July 2023    Mitral/Aortic valve replacement   - Recent MUSTAPHA shows bioprosthetic valves with only moderate paravalvular leak in MV  - Now w/ large vegetation on MV  - CTSx following but no plan for surg intervention at this time      VTE ppx: HSQ  Dispo: remains acute

## 2023-10-05 NOTE — PROGRESS NOTE ADULT - SUBJECTIVE AND OBJECTIVE BOX
Patient is a 63y old POD 4 from a L AV graft removal.   Patient was seen on bedside rounds this AM. Overnight patient complained that he has been "spitting up phlegm", but denies vomiting. LUE wound was assessed; no signs of acute infection.     ceFAZolin  Injectable. 2000  amLODIPine   Tablet 10  carvedilol 6.25  ceFAZolin  Injectable. 2000  heparin   Injectable 5000    Allergies    penicillin (Other)    Intolerances        Vital Signs Last 24 Hrs  T(C): 36.5 (05 Oct 2023 04:30), Max: 36.8 (04 Oct 2023 21:46)  T(F): 97.7 (05 Oct 2023 04:30), Max: 98.3 (05 Oct 2023 00:50)  HR: 63 (05 Oct 2023 04:30) (63 - 75)  BP: 143/70 (05 Oct 2023 04:30) (122/66 - 152/64)  BP(mean): --  RR: 18 (05 Oct 2023 04:30) (17 - 18)  SpO2: 96% (05 Oct 2023 04:30) (90% - 96%)    Parameters below as of 05 Oct 2023 04:30  Patient On (Oxygen Delivery Method): nasal cannula  O2 Flow (L/min): 2    I&O's Detail    03 Oct 2023 07:01  -  04 Oct 2023 07:00  --------------------------------------------------------  IN:  Total IN: 0 mL    OUT:    Nasogastric/Oral tube (mL): 675 mL  Total OUT: 675 mL    Total NET: -675 mL      Physical Exam:  General: NAD, resting comfortably in bed  Pulmonary: Nonlabored breathing, no respiratory distress  Cardiovascular: NSR  Abdominal: soft, NT/ND  Extremities:  LUE wound packed with packing. Packing is being slowly cut and removed daily. No purulent drainage noted.   Vascular: LUE ulnar signals intact.      LABS:                        10.4   16.24 )-----------( 147      ( 04 Oct 2023 06:37 )             33.3     10-04    146<H>  |  106  |  35.9<H>  ----------------------------<  107<H>  4.0   |  31.0<H>  |  1.62<H>    Ca    8.6      04 Oct 2023 06:37  Phos  2.9     10-03  Mg     2.2     10-04    TPro  6.4<L>  /  Alb  2.4<L>  /  TBili  0.4  /  DBili  x   /  AST  30  /  ALT  <5  /  AlkPhos  134<H>  10-04        CHF (congestive heart failure)    CVA (cerebral vascular accident)    Chronic kidney disease    Seizures    HTN (hypertension)    Hyperlipemia    COVID-19    Atrial fibrillation    ESRD on dialysis    Former smoker    History of cocaine use    H/O aortic dissection    H/O aortic valve insufficiency    Mitral regurgitation    GIB (gastrointestinal bleeding)    CAD (coronary artery disease)    Chronic atrial fibrillation    Aorta disorder    H/O colectomy    Status post double vessel coronary artery bypass    S/P AVR (aortic valve replacement)    S/P MVR (mitral valve replacement)    H/O aortic dissection    AV fistula    Removal, AV graft, infected

## 2023-10-05 NOTE — PROGRESS NOTE ADULT - ASSESSMENT
A) Stable CRF 3 , Subacute left renal infarct, Right  renal lesion similar  to  prior; endocarditis.    P) IV meds, fluid.

## 2023-10-06 LAB
ALBUMIN SERPL ELPH-MCNC: 2.6 G/DL — LOW (ref 3.3–5.2)
ALP SERPL-CCNC: 111 U/L — SIGNIFICANT CHANGE UP (ref 40–120)
ALT FLD-CCNC: <5 U/L — SIGNIFICANT CHANGE UP
ANION GAP SERPL CALC-SCNC: 13 MMOL/L — SIGNIFICANT CHANGE UP (ref 5–17)
AST SERPL-CCNC: 22 U/L — SIGNIFICANT CHANGE UP
BASOPHILS # BLD AUTO: 0.06 K/UL — SIGNIFICANT CHANGE UP (ref 0–0.2)
BASOPHILS NFR BLD AUTO: 0.9 % — SIGNIFICANT CHANGE UP (ref 0–2)
BILIRUB SERPL-MCNC: 0.6 MG/DL — SIGNIFICANT CHANGE UP (ref 0.4–2)
BUN SERPL-MCNC: 34.8 MG/DL — HIGH (ref 8–20)
CALCIUM SERPL-MCNC: 8.5 MG/DL — SIGNIFICANT CHANGE UP (ref 8.4–10.5)
CHLORIDE SERPL-SCNC: 106 MMOL/L — SIGNIFICANT CHANGE UP (ref 96–108)
CO2 SERPL-SCNC: 27 MMOL/L — SIGNIFICANT CHANGE UP (ref 22–29)
CREAT SERPL-MCNC: 1.71 MG/DL — HIGH (ref 0.5–1.3)
EGFR: 45 ML/MIN/1.73M2 — LOW
EOSINOPHIL # BLD AUTO: 0 K/UL — SIGNIFICANT CHANGE UP (ref 0–0.5)
EOSINOPHIL NFR BLD AUTO: 0 % — SIGNIFICANT CHANGE UP (ref 0–6)
GIANT PLATELETS BLD QL SMEAR: PRESENT — SIGNIFICANT CHANGE UP
GLUCOSE SERPL-MCNC: 124 MG/DL — HIGH (ref 70–99)
HCT VFR BLD CALC: 36.8 % — LOW (ref 39–50)
HGB BLD-MCNC: 11.1 G/DL — LOW (ref 13–17)
LYMPHOCYTES # BLD AUTO: 0.33 K/UL — LOW (ref 1–3.3)
LYMPHOCYTES # BLD AUTO: 5.3 % — LOW (ref 13–44)
MANUAL SMEAR VERIFICATION: SIGNIFICANT CHANGE UP
MCHC RBC-ENTMCNC: 28 PG — SIGNIFICANT CHANGE UP (ref 27–34)
MCHC RBC-ENTMCNC: 30.2 GM/DL — LOW (ref 32–36)
MCV RBC AUTO: 92.9 FL — SIGNIFICANT CHANGE UP (ref 80–100)
MONOCYTES # BLD AUTO: 0.11 K/UL — SIGNIFICANT CHANGE UP (ref 0–0.9)
MONOCYTES NFR BLD AUTO: 1.8 % — LOW (ref 2–14)
NEUTROPHILS # BLD AUTO: 5.76 K/UL — SIGNIFICANT CHANGE UP (ref 1.8–7.4)
NEUTROPHILS NFR BLD AUTO: 92 % — HIGH (ref 43–77)
OVALOCYTES BLD QL SMEAR: SLIGHT — SIGNIFICANT CHANGE UP
PLAT MORPH BLD: NORMAL — SIGNIFICANT CHANGE UP
PLATELET # BLD AUTO: 157 K/UL — SIGNIFICANT CHANGE UP (ref 150–400)
POIKILOCYTOSIS BLD QL AUTO: SLIGHT — SIGNIFICANT CHANGE UP
POLYCHROMASIA BLD QL SMEAR: SLIGHT — SIGNIFICANT CHANGE UP
POTASSIUM SERPL-MCNC: 3.9 MMOL/L — SIGNIFICANT CHANGE UP (ref 3.5–5.3)
POTASSIUM SERPL-SCNC: 3.9 MMOL/L — SIGNIFICANT CHANGE UP (ref 3.5–5.3)
PROT SERPL-MCNC: 6.9 G/DL — SIGNIFICANT CHANGE UP (ref 6.6–8.7)
RBC # BLD: 3.96 M/UL — LOW (ref 4.2–5.8)
RBC # FLD: 13.9 % — SIGNIFICANT CHANGE UP (ref 10.3–14.5)
RBC BLD AUTO: ABNORMAL
SMUDGE CELLS # BLD: PRESENT — SIGNIFICANT CHANGE UP
SODIUM SERPL-SCNC: 146 MMOL/L — HIGH (ref 135–145)
WBC # BLD: 6.26 K/UL — SIGNIFICANT CHANGE UP (ref 3.8–10.5)
WBC # FLD AUTO: 6.26 K/UL — SIGNIFICANT CHANGE UP (ref 3.8–10.5)

## 2023-10-06 PROCEDURE — 99233 SBSQ HOSP IP/OBS HIGH 50: CPT | Mod: FS

## 2023-10-06 PROCEDURE — 99232 SBSQ HOSP IP/OBS MODERATE 35: CPT

## 2023-10-06 PROCEDURE — 71045 X-RAY EXAM CHEST 1 VIEW: CPT | Mod: 26

## 2023-10-06 PROCEDURE — 99233 SBSQ HOSP IP/OBS HIGH 50: CPT

## 2023-10-06 PROCEDURE — 74018 RADEX ABDOMEN 1 VIEW: CPT | Mod: 26

## 2023-10-06 RX ORDER — HYDROMORPHONE HYDROCHLORIDE 2 MG/ML
1 INJECTION INTRAMUSCULAR; INTRAVENOUS; SUBCUTANEOUS EVERY 6 HOURS
Refills: 0 | Status: DISCONTINUED | OUTPATIENT
Start: 2023-10-06 | End: 2023-10-13

## 2023-10-06 RX ORDER — HYDRALAZINE HCL 50 MG
10 TABLET ORAL EVERY 6 HOURS
Refills: 0 | Status: DISCONTINUED | OUTPATIENT
Start: 2023-10-06 | End: 2023-10-07

## 2023-10-06 RX ORDER — METOPROLOL TARTRATE 50 MG
5 TABLET ORAL EVERY 6 HOURS
Refills: 0 | Status: DISCONTINUED | OUTPATIENT
Start: 2023-10-06 | End: 2023-10-06

## 2023-10-06 RX ORDER — HYDROMORPHONE HYDROCHLORIDE 2 MG/ML
0.5 INJECTION INTRAMUSCULAR; INTRAVENOUS; SUBCUTANEOUS EVERY 6 HOURS
Refills: 0 | Status: DISCONTINUED | OUTPATIENT
Start: 2023-10-06 | End: 2023-10-13

## 2023-10-06 RX ADMIN — LEVETIRACETAM 400 MILLIGRAM(S): 250 TABLET, FILM COATED ORAL at 17:19

## 2023-10-06 RX ADMIN — Medication 5 MILLIGRAM(S): at 12:28

## 2023-10-06 RX ADMIN — HYDROMORPHONE HYDROCHLORIDE 1 MILLIGRAM(S): 2 INJECTION INTRAMUSCULAR; INTRAVENOUS; SUBCUTANEOUS at 21:09

## 2023-10-06 RX ADMIN — Medication 2000 MILLIGRAM(S): at 12:29

## 2023-10-06 RX ADMIN — HYDROMORPHONE HYDROCHLORIDE 1 MILLIGRAM(S): 2 INJECTION INTRAMUSCULAR; INTRAVENOUS; SUBCUTANEOUS at 18:15

## 2023-10-06 RX ADMIN — Medication 5 MILLIGRAM(S): at 17:19

## 2023-10-06 RX ADMIN — HEPARIN SODIUM 5000 UNIT(S): 5000 INJECTION INTRAVENOUS; SUBCUTANEOUS at 14:52

## 2023-10-06 RX ADMIN — Medication 2000 MILLIGRAM(S): at 20:19

## 2023-10-06 RX ADMIN — HEPARIN SODIUM 5000 UNIT(S): 5000 INJECTION INTRAVENOUS; SUBCUTANEOUS at 05:41

## 2023-10-06 RX ADMIN — HEPARIN SODIUM 5000 UNIT(S): 5000 INJECTION INTRAVENOUS; SUBCUTANEOUS at 21:02

## 2023-10-06 RX ADMIN — HYDROMORPHONE HYDROCHLORIDE 1 MILLIGRAM(S): 2 INJECTION INTRAMUSCULAR; INTRAVENOUS; SUBCUTANEOUS at 22:09

## 2023-10-06 RX ADMIN — HYDROMORPHONE HYDROCHLORIDE 1 MILLIGRAM(S): 2 INJECTION INTRAMUSCULAR; INTRAVENOUS; SUBCUTANEOUS at 17:19

## 2023-10-06 RX ADMIN — LEVETIRACETAM 400 MILLIGRAM(S): 250 TABLET, FILM COATED ORAL at 05:32

## 2023-10-06 RX ADMIN — Medication 2000 MILLIGRAM(S): at 04:04

## 2023-10-06 NOTE — PROGRESS NOTE ADULT - ASSESSMENT
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 Patient presented to [facility] for 2 weeks of inability to tolerate oral intake. Denies any fever or chills. Has chronic diarrhea due to bowel resection. In the ER patient was afebrile. Found to have leukocytosis to 23k. Patient was found to have SANTOS. Had a febrile episode to 102F on 9/25. Started on Vancomycin and Meropenem. Blood cultures 1 of 4 bottles with Staphylococcus epidermidis      Prosthetic MV endocarditis   Staphylococcus epidermidis bacteremia  Infected thrombosed Graft   s/p Left AV graft removal 10/1/23  Fever  Leukocytosis   SANTOS   PCN allergy   Ileus     - Blood cultures 9/23, 9/25 reporting Staphylococcus epidermidis  - Repeat blood cultures  9/27 no growth   - RVP/COVID 19 PCR 9/25 negative   - CT A/P 9/23 reporting no acute findings   - CXR with ileus on 10/3  - TTE reporting MV veg  - MUSTAPHA reporting large mobile density MV  - UA 9/25 negative for UTI   - PCN allergy but has tolerated Zosyn and Zinacef in the past.   - Vascular surgery following for thrombosed graft. s/p Left AV graft removal 10/1/23  - Continue Cefazolin   - Due to renal failure will hold off on Gentamicin  - Due to Eliquis unable to use Rifampin   - Plan for Repeat MUSTAPHA on or after 10/24/23  - Follow up cultures  - Trend Fever  - Trend WBC      Will Follow    Discussed treatment plan with: Dr Schneider

## 2023-10-06 NOTE — PROGRESS NOTE ADULT - SUBJECTIVE AND OBJECTIVE BOX
Patient seen and examined at bedside. Nausea and vomiting overnight, recommended NGT, but patient declined.     MEDICATIONS  (STANDING):  amLODIPine   Tablet 10 milliGRAM(s) Oral daily  atorvastatin 40 milliGRAM(s) Oral at bedtime  carvedilol 6.25 milliGRAM(s) Oral every 12 hours  ceFAZolin  Injectable. 2000 milliGRAM(s) IV Push every 8 hours  dextrose 5% + sodium chloride 0.45% with potassium chloride 10 mEq/L 1000 milliLiter(s) (75 mL/Hr) IV Continuous <Continuous>  heparin   Injectable 5000 Unit(s) SubCutaneous every 8 hours  levETIRAcetam  IVPB 1000 milliGRAM(s) IV Intermittent every 12 hours  polyethylene glycol 3350 17 Gram(s) Oral daily  senna 2 Tablet(s) Oral at bedtime  tamsulosin 0.4 milliGRAM(s) Oral at bedtime    MEDICATIONS  (PRN):  acetaminophen     Tablet .. 650 milliGRAM(s) Oral every 6 hours PRN Temp greater or equal to 38C (100.4F), Mild Pain (1 - 3)  aluminum hydroxide/magnesium hydroxide/simethicone Suspension 30 milliLiter(s) Oral every 4 hours PRN Dyspepsia  HYDROmorphone  Injectable 1 milliGRAM(s) IV Push every 4 hours PRN breakthru pain  or prior o dressing changes  melatonin 3 milliGRAM(s) Oral at bedtime PRN Insomnia  ondansetron Injectable 4 milliGRAM(s) IV Push every 8 hours PRN Nausea and/or Vomiting  oxyCODONE    IR 5 milliGRAM(s) Oral every 4 hours PRN Moderate Pain (4 - 6)  oxyCODONE    IR 10 milliGRAM(s) Oral every 4 hours PRN Severe Pain (7 - 10)      Vital Signs Last 24 Hrs  T(C): 36.9 (06 Oct 2023 00:08), Max: 36.9 (06 Oct 2023 00:08)  T(F): 98.5 (06 Oct 2023 00:08), Max: 98.5 (06 Oct 2023 00:08)  HR: 68 (06 Oct 2023 00:08) (60 - 68)  BP: 155/67 (06 Oct 2023 00:08) (135/65 - 157/68)  BP(mean): --  RR: 18 (06 Oct 2023 00:08) (18 - 18)  SpO2: 92% (06 Oct 2023 00:08) (91% - 96%)    Parameters below as of 06 Oct 2023 00:08  Patient On (Oxygen Delivery Method): room air                            10.2   7.03  )-----------( 138      ( 05 Oct 2023 06:15 )             33.5   10-05    146<H>  |  108  |  35.9<H>  ----------------------------<  114<H>  4.1   |  28.0  |  1.78<H>    Ca    8.6      05 Oct 2023 06:15  Mg     2.2     10-04    TPro  6.3<L>  /  Alb  2.4<L>  /  TBili  0.5  /  DBili  x   /  AST  22  /  ALT  <5  /  AlkPhos  110  10-05      Physical Exam:  General: NAD, resting comfortably in bed  Pulmonary: Nonlabored breathing  Cardiovascular: RRR  Abdominal: soft, NT/ND  Extremities:  LUE wound packed with packing. Packing is being slowly cut and removed daily. No purulent drainage noted.   Vascular: palp ulnar signals   Patient seen and examined at bedside. Nausea and vomiting overnight, recommended NGT, but patient declined.     MEDICATIONS  (STANDING):  amLODIPine   Tablet 10 milliGRAM(s) Oral daily  atorvastatin 40 milliGRAM(s) Oral at bedtime  carvedilol 6.25 milliGRAM(s) Oral every 12 hours  ceFAZolin  Injectable. 2000 milliGRAM(s) IV Push every 8 hours  dextrose 5% + sodium chloride 0.45% with potassium chloride 10 mEq/L 1000 milliLiter(s) (75 mL/Hr) IV Continuous <Continuous>  heparin   Injectable 5000 Unit(s) SubCutaneous every 8 hours  levETIRAcetam  IVPB 1000 milliGRAM(s) IV Intermittent every 12 hours  polyethylene glycol 3350 17 Gram(s) Oral daily  senna 2 Tablet(s) Oral at bedtime  tamsulosin 0.4 milliGRAM(s) Oral at bedtime    MEDICATIONS  (PRN):  acetaminophen     Tablet .. 650 milliGRAM(s) Oral every 6 hours PRN Temp greater or equal to 38C (100.4F), Mild Pain (1 - 3)  aluminum hydroxide/magnesium hydroxide/simethicone Suspension 30 milliLiter(s) Oral every 4 hours PRN Dyspepsia  HYDROmorphone  Injectable 1 milliGRAM(s) IV Push every 4 hours PRN breakthru pain  or prior o dressing changes  melatonin 3 milliGRAM(s) Oral at bedtime PRN Insomnia  ondansetron Injectable 4 milliGRAM(s) IV Push every 8 hours PRN Nausea and/or Vomiting  oxyCODONE    IR 5 milliGRAM(s) Oral every 4 hours PRN Moderate Pain (4 - 6)  oxyCODONE    IR 10 milliGRAM(s) Oral every 4 hours PRN Severe Pain (7 - 10)      Vital Signs Last 24 Hrs  T(C): 36.9 (06 Oct 2023 00:08), Max: 36.9 (06 Oct 2023 00:08)  T(F): 98.5 (06 Oct 2023 00:08), Max: 98.5 (06 Oct 2023 00:08)  HR: 68 (06 Oct 2023 00:08) (60 - 68)  BP: 155/67 (06 Oct 2023 00:08) (135/65 - 157/68)  BP(mean): --  RR: 18 (06 Oct 2023 00:08) (18 - 18)  SpO2: 92% (06 Oct 2023 00:08) (91% - 96%)    Parameters below as of 06 Oct 2023 00:08  Patient On (Oxygen Delivery Method): room air                            10.2   7.03  )-----------( 138      ( 05 Oct 2023 06:15 )             33.5   10-05    146<H>  |  108  |  35.9<H>  ----------------------------<  114<H>  4.1   |  28.0  |  1.78<H>    Ca    8.6      05 Oct 2023 06:15  Mg     2.2     10-04    TPro  6.3<L>  /  Alb  2.4<L>  /  TBili  0.5  /  DBili  x   /  AST  22  /  ALT  <5  /  AlkPhos  110  10-05      Physical Exam:  General: NAD, patient is dry heaving during exam  Pulmonary: Nonlabored breathing  Cardiovascular: RRR  Abdominal: soft, mildly distended, nt  Extremities: LUE wound packed.  No purulent drainage or strikethrough on dressing noted.   Vascular: palp ulnar signals

## 2023-10-06 NOTE — PROGRESS NOTE ADULT - SUBJECTIVE AND OBJECTIVE BOX
[facility] Division of Hospital Medicine     Chief Complaint:  N/V, SANTOS     SUBJECTIVE / OVERNIGHT EVENTS:   Pt reports he feels better   Overnight with nausea and vomiting   +NGT placed overnight     Patient denies chest pain, SOB, N/V, fever, chills, dysuria or any other complaints. All remainder ROS negative.     MEDICATIONS  (STANDING):  amLODIPine   Tablet 10 milliGRAM(s) Oral daily  atorvastatin 40 milliGRAM(s) Oral at bedtime  ceFAZolin  Injectable. 2000 milliGRAM(s) IV Push every 8 hours  dextrose 5% + sodium chloride 0.45% with potassium chloride 10 mEq/L 1000 milliLiter(s) (75 mL/Hr) IV Continuous <Continuous>  heparin   Injectable 5000 Unit(s) SubCutaneous every 8 hours  levETIRAcetam  IVPB 1000 milliGRAM(s) IV Intermittent every 12 hours  metoprolol tartrate Injectable 5 milliGRAM(s) IV Push every 6 hours  polyethylene glycol 3350 17 Gram(s) Oral daily  senna 2 Tablet(s) Oral at bedtime  tamsulosin 0.4 milliGRAM(s) Oral at bedtime    MEDICATIONS  (PRN):  acetaminophen     Tablet .. 650 milliGRAM(s) Oral every 6 hours PRN Temp greater or equal to 38C (100.4F), Mild Pain (1 - 3)  aluminum hydroxide/magnesium hydroxide/simethicone Suspension 30 milliLiter(s) Oral every 4 hours PRN Dyspepsia  hydrALAZINE Injectable 10 milliGRAM(s) IV Push every 6 hours PRN SBP>170  HYDROmorphone  Injectable 1 milliGRAM(s) IV Push every 4 hours PRN breakthru pain  or prior o dressing changes  melatonin 3 milliGRAM(s) Oral at bedtime PRN Insomnia  ondansetron Injectable 4 milliGRAM(s) IV Push every 8 hours PRN Nausea and/or Vomiting  oxyCODONE    IR 5 milliGRAM(s) Oral every 4 hours PRN Moderate Pain (4 - 6)  oxyCODONE    IR 10 milliGRAM(s) Oral every 4 hours PRN Severe Pain (7 - 10)        I&O's Summary    05 Oct 2023 07:01  -  06 Oct 2023 07:00  --------------------------------------------------------  IN: 1500 mL / OUT: 100 mL / NET: 1400 mL        PHYSICAL EXAM:  Vital Signs Last 24 Hrs  T(C): 36.6 (06 Oct 2023 08:40), Max: 36.9 (06 Oct 2023 00:08)  T(F): 97.9 (06 Oct 2023 08:40), Max: 98.5 (06 Oct 2023 00:08)  HR: 70 (06 Oct 2023 08:40) (60 - 79)  BP: 181/79 (06 Oct 2023 08:40) (135/65 - 181/79)  BP(mean): --  RR: 18 (06 Oct 2023 08:40) (18 - 18)  SpO2: 95% (06 Oct 2023 08:40) (91% - 95%)    Parameters below as of 06 Oct 2023 08:40  Patient On (Oxygen Delivery Method): room air        CONSTITUTIONAL: NAD, sitting up in bed  ENMT: +NGT   RESPIRATORY: Normal respiratory effort; lungs are clear to auscultation bilaterally  CARDIOVASCULAR: Regular rate and rhythm, normal S1 and S2   ABDOMEN: Nontender to palpation, normoactive bowel sounds  PSYCH: A+O to person, place, and time; affect appropriate  NEUROLOGY: No gross sensory or motor deficits       LABS:                        11.1   6.26  )-----------( 157      ( 06 Oct 2023 06:45 )             36.8     10-06    146<H>  |  106  |  34.8<H>  ----------------------------<  124<H>  3.9   |  27.0  |  1.71<H>    Ca    8.5      06 Oct 2023 06:45    TPro  6.9  /  Alb  2.6<L>  /  TBili  0.6  /  DBili  x   /  AST  22  /  ALT  <5  /  AlkPhos  111  10-06          Urinalysis Basic - ( 06 Oct 2023 06:45 )    Color: x / Appearance: x / SG: x / pH: x  Gluc: 124 mg/dL / Ketone: x  / Bili: x / Urobili: x   Blood: x / Protein: x / Nitrite: x   Leuk Esterase: x / RBC: x / WBC x   Sq Epi: x / Non Sq Epi: x / Bacteria: x        CAPILLARY BLOOD GLUCOSE      POCT Blood Glucose.: 116 mg/dL (05 Oct 2023 18:20)  POCT Blood Glucose.: 117 mg/dL (05 Oct 2023 13:28)

## 2023-10-06 NOTE — PROGRESS NOTE ADULT - SUBJECTIVE AND OBJECTIVE BOX
Subjective: Patient was resting as comfortable as possible on the bed. He reported feeling better, has an NGT in place, slightly leaning on edge of bed. He reported not vomiting after the NGT was placed late last night. He reported bowel movements and passing gas.     STATUS POST: removal of infected AV graft    POST OPERATIVE DAY #: 4 days    MEDICATIONS  (STANDING):  amLODIPine   Tablet 10 milliGRAM(s) Oral daily  atorvastatin 40 milliGRAM(s) Oral at bedtime  carvedilol 6.25 milliGRAM(s) Oral every 12 hours  ceFAZolin  Injectable. 2000 milliGRAM(s) IV Push every 8 hours  dextrose 5% + sodium chloride 0.45% with potassium chloride 10 mEq/L 1000 milliLiter(s) (75 mL/Hr) IV Continuous <Continuous>  heparin   Injectable 5000 Unit(s) SubCutaneous every 8 hours  levETIRAcetam  IVPB 1000 milliGRAM(s) IV Intermittent every 12 hours  polyethylene glycol 3350 17 Gram(s) Oral daily  senna 2 Tablet(s) Oral at bedtime  tamsulosin 0.4 milliGRAM(s) Oral at bedtime    MEDICATIONS  (PRN):  acetaminophen     Tablet .. 650 milliGRAM(s) Oral every 6 hours PRN Temp greater or equal to 38C (100.4F), Mild Pain (1 - 3)  aluminum hydroxide/magnesium hydroxide/simethicone Suspension 30 milliLiter(s) Oral every 4 hours PRN Dyspepsia  HYDROmorphone  Injectable 1 milliGRAM(s) IV Push every 4 hours PRN breakthru pain  or prior o dressing changes  melatonin 3 milliGRAM(s) Oral at bedtime PRN Insomnia  ondansetron Injectable 4 milliGRAM(s) IV Push every 8 hours PRN Nausea and/or Vomiting  oxyCODONE    IR 5 milliGRAM(s) Oral every 4 hours PRN Moderate Pain (4 - 6)  oxyCODONE    IR 10 milliGRAM(s) Oral every 4 hours PRN Severe Pain (7 - 10)      Vital Signs Last 24 Hrs  T(C): 36.4 (06 Oct 2023 04:15), Max: 36.9 (06 Oct 2023 00:08)  T(F): 97.5 (06 Oct 2023 04:15), Max: 98.5 (06 Oct 2023 00:08)  HR: 79 (06 Oct 2023 04:15) (60 - 79)  BP: 157/75 (06 Oct 2023 04:15) (135/65 - 157/75)  BP(mean): --  RR: 18 (06 Oct 2023 04:15) (18 - 18)  SpO2: 93% (06 Oct 2023 04:15) (91% - 95%)    Parameters below as of 06 Oct 2023 04:15  Patient On (Oxygen Delivery Method): room air      Physical Exam:    Constitutional: NAD  HEENT: PERRL, EOMI  Neck: No JVD, FROM without pain  Respiratory: Respirations non-labored, no accessory muscle use  Gastrointestinal: Soft, non-tender, slightly distended. NGT placed  Extremities: No peripheral edema, No cyanosis  Neurological: A&O x 3; without gross deficit      LABS:                        11.1   6.26  )-----------( 157      ( 06 Oct 2023 06:45 )             36.8     10-05    146<H>  |  108  |  35.9<H>  ----------------------------<  114<H>  4.1   |  28.0  |  1.78<H>    Ca    8.6      05 Oct 2023 06:15    TPro  6.3<L>  /  Alb  2.4<L>  /  TBili  0.5  /  DBili  x   /  AST  22  /  ALT  <5  /  AlkPhos  110  10-05      Urinalysis Basic - ( 05 Oct 2023 06:15 )    Color: x / Appearance: x / SG: x / pH: x  Gluc: 114 mg/dL / Ketone: x  / Bili: x / Urobili: x   Blood: x / Protein: x / Nitrite: x   Leuk Esterase: x / RBC: x / WBC x   Sq Epi: x / Non Sq Epi: x / Bacteria: x        A:     Plan:   -

## 2023-10-06 NOTE — PROGRESS NOTE ADULT - TIME BILLING
This includes chart review, patient assessment, discussion with Clinical Pharmacy and CT Surgery. Antibiotic dosing and management with clinical pharmacy.

## 2023-10-06 NOTE — PROGRESS NOTE ADULT - SUBJECTIVE AND OBJECTIVE BOX
[facility]  INFECTIOUS DISEASES at [place] / [place] / [place]  =======================================================                               Roosevelt Victor MD#   Fabián Quigley MD*                             Valentine Handley MD*   Reba Catalan MD*                              Professor Emeritus:  Dr Johnny Hathwaay MD^            Diplomates American Board of Internal Medicine & Infectious Diseases                # [place] Office - Appt - Tel  [phone] Fax [phone]                * [place] Office - Appt - Tel [phone] Fax [phone]                      ^[place] Office - Tel  [phone] Fax [phone]                                  Hospital Consult line:  [phone]  =======================================================    JONATHAN GIBSON [id number]    Follow up: MV Endocarditis     s/p Left AV graft removal 10/1/23    no fevers   s/p NGT    Allergies:  penicillin (Other)       REVIEW OF SYSTEMS:  CONSTITUTIONAL:  No Fever or chills  HEENT:  No diplopia or blurred vision.  No earache, sore throat or runny nose.  CARDIOVASCULAR:  No chest pain  RESPIRATORY:  No cough, shortness of breath  GASTROINTESTINAL:  + nausea, + vomiting.  GENITOURINARY:  No dysuria, frequency or urgency. No Blood in urine  MUSCULOSKELETAL:  no joint aches, no muscle pain  SKIN:  No change in skin, hair or nails.  NEUROLOGIC:  No Headaches      Physical Exam:  GEN: NAD  HEENT: normocephalic and atraumatic. EOMI. PERRL.    NECK: Supple.   LUNGS: CTA B/L.  HEART: RRR  ABDOMEN: Soft, NT.  +BS.  less distended   : No CVA tenderness  EXTREMITIES: Without  edema.  MSK: No joint swelling  NEUROLOGIC: No Focal Deficits       Vitals:  T(F): 97.9 (06 Oct 2023 08:40), Max: 98.5 (06 Oct 2023 00:08)  HR: 70 (06 Oct 2023 08:40)  BP: 181/79 (06 Oct 2023 08:40)  RR: 18 (06 Oct 2023 08:40)  SpO2: 95% (06 Oct 2023 08:40) (91% - 95%)  temp max in last 48H T(F): , Max: 98.5 (10-06-23 @ 00:08)    Current Antibiotics:  ceFAZolin  Injectable. 2000 milliGRAM(s) IV Push every 8 hours    Other medications:  amLODIPine   Tablet 10 milliGRAM(s) Oral daily  atorvastatin 40 milliGRAM(s) Oral at bedtime  dextrose 5% + sodium chloride 0.45% with potassium chloride 10 mEq/L 1000 milliLiter(s) IV Continuous <Continuous>  heparin   Injectable 5000 Unit(s) SubCutaneous every 8 hours  levETIRAcetam  IVPB 1000 milliGRAM(s) IV Intermittent every 12 hours  metoprolol tartrate Injectable 5 milliGRAM(s) IV Push every 6 hours  polyethylene glycol 3350 17 Gram(s) Oral daily  senna 2 Tablet(s) Oral at bedtime  tamsulosin 0.4 milliGRAM(s) Oral at bedtime                            11.1   6.26  )-----------( 157      ( 06 Oct 2023 06:45 )             36.8     10-06    146<H>  |  106  |  34.8<H>  ----------------------------<  124<H>  3.9   |  27.0  |  1.71<H>    Ca    8.5      06 Oct 2023 06:45    TPro  6.9  /  Alb  2.6<L>  /  TBili  0.6  /  DBili  x   /  AST  22  /  ALT  <5  /  AlkPhos  111  10-06    RECENT CULTURES:  10-01 @ 12:00 .Abscess arm abscess     No growth  Upon re-evaluation of gram stain:  No organisms seen per oil power field  Moderate polymorphonuclear leukocytes seen per low power field  Previously reported as:  Rare Gram positive cocci in pairs seen per oil power field  Rare Gram Negative Rods seen per oil power field    09-27 @ 14:47 .Blood Blood-Peripheral     No growth at 5 days    09-27 @ 14:42 .Blood Blood-Peripheral     No growth at 5 days    09-25 @ 06:50 .Stool Feces     No enteric pathogens isolated.  (Stool culture examined for Salmonella,  Shigella, Campylobacter, Aeromonas, Plesiomonas,  Vibrio, E.coli O157 and Yersinia)    09-25 @ 06:00    RVP  NotDete    09-25 @ 05:50 .Blood Blood-Peripheral Staphylococcus epidermidis    Growth in aerobic and anaerobic bottles: Staphylococcus epidermidis  Growth in aerobic bottle: Gram Positive Cocci in Clusters  Growth in anaerobic bottle: Gram Positive Cocci in Clusters    09-25 @ 05:13 .Blood Blood-Peripheral     Growth in aerobic bottle: Staphylococcus epidermidis  "Susceptibilities not performed"  Growth in aerobic bottle: Gram Positive Cocci in Clusters    09-25 @ 01:00 Clean Catch Clean Catch (Midstream)     <10,000 CFU/mL Normal Urogenital Sivan    09-23 @ 19:30 .Blood Blood-Peripheral     Growth in aerobic and anaerobic bottles: Staphylococcus epidermidis  "Susceptibilities not performed"  Growth in aerobic bottle: Gram Positive Cocci in Clusters    Growth in anaerobic bottle: Gram Positive Cocci in Clusters    09-23 @ 19:25 .Blood Blood-Peripheral Blood Culture PCR  Staphylococcus epidermidis    Growth in aerobic and anaerobic bottles: Staphylococcus epidermidis  Direct identification is available within approximately 3-5  hours either by Blood Panel Multiplexed PCR or Direct  MALDI-TOF. Details: [email or web address]  Growth in aerobic bottle: Gram Positive Cocci in Clusters  Growth in anaerobic bottle: Gram Positive Cocci in Clusters      WBC Count: 6.26 K/uL (10-06-23 @ 06:45)  WBC Count: 7.03 K/uL (10-05-23 @ 06:15)  WBC Count: 16.24 K/uL (10-04-23 @ 06:37)  WBC Count: 25.01 K/uL (10-03-23 @ 09:52)  WBC Count: 11.18 K/uL (10-02-23 @ 05:19)    Creatinine: 1.71 mg/dL (10-06-23 @ 06:45)  Creatinine: 1.78 mg/dL (10-05-23 @ 06:15)  Creatinine: 1.62 mg/dL (0-04-23 @ 06:37)  Creatinine: 1.59 mg/dL (10-03-23 @ 09:52)  Creatinine: 1.64 mg/dL (10-02-23 @ 05:19)    Procalcitonin, Serum: 1.75 ng/mL (09-25-23 @ 05:50)     SARS-CoV-2: NotDetec (09-25-23 @ 06:00)          < from: Xray Chest 1 View-PORTABLE IMMEDIATE (Xray Chest 1 View-PORTABLE IMMEDIATE .) (10.06.23 @ 06:21) >  ACC: [id number] EXAM:  XR CHEST PORTABLE IMMED 1V   ORDERED BY: MARIA C VANESSA     ACC: [id number] EXAM:  XR CHEST PORTABLE IMMED 1V   ORDERED BY: MARIA C VANESSA     PROCEDURE DATE:  10/06/2023      INTERPRETATION:  AP chest on October 6, 2023 at 4:34 AM. Concern is NG   tube.    Heart magnified by technique. Sternotomy 2 prostatic heart valves again   noted. Lungs are clear.    NG tube again noted. Tip is in the antrum.    Follow-up AP chest on October 6, 2020 23 x 17 a.m. Patient has NG tube   placement.    Heart magnified by technique.    Sternotomy and valve replacements noted.    Lungs are clear.    NG tube is again noted. On study of 4:34 AM was in the stomach but the NG   tube tip is barely in the stomach at this time.    IMPRESSION: NG tubetip is barely in the stomach at this time.    --- End of Report ---  < end of copied text >        < from: MUSTAPHA Echo Doppler (09.27.23 @ 10:59) >  Summary:   1. Left ventricular ejection fraction, by visual estimation, is 55 to 60%.   2. Severely enlarged left atrium.   3. Mild mitral valve regurgitation.   4. Mitral prosthesis vegetation.   5. There is a bioprosthetic valve in the mitral position with thickened   leaflets yet leaflet mobility is fairly preserved. There is trace to mild   valvular regurgitation with no perivalvular leaks. No rocking motion or   dehiscence noted. There is a large and mobile vegetation measuring   1.8x1.0 cm attached to the atrial surface of the medial bioprosthetic   leaflet. There is evidence of at least mild valve stenosis with a mean   transvalvular gradient of 5 mmHg at a heart rate of 60 bpm. The mitral   valve area by 3D is 1.57 cm2.   6. Mild tricuspid regurgitation.   7. Bioprosthesis in the aortic position.   8. Dilatation of the aortic root.    < end of copied text >      < from: TTE Echo Complete w/o Contrast w/ Doppler (09.26.23 @ 14:32) >  Summary:   1. Left ventricular ejection fraction, by visual estimation, is 50 to   55%.   2. Normal global left ventricular systolic function.   3. Diastolic function indeterminate.   4. Abnormal septal motion consistent with post-operative status.   5. Normal RV size with mildly reduced RV systolic function, inadequate   estimation of RVSP.   6. Bioprosthetic mitral valve present with a large mobile lesion (at   least 2.0 cm x 1.5 cm) prolapsing into and out of the LV-LA cavity,   hightly suggestive of vegetation. Mean transmitral valve gradient 4.8   mmHg (at HR of 60 bpm).   7. Bioprosthesis in the aortic position, mean gradient of 9 mmHg.   8. There is no evidence of pericardial effusion.   9. Compared to the prior TTE study from 5/23/23, lesion over bioMVR is   again noted and is now much larger and recommend MUSTAPHA study to confirm   vegetation and exclude other valvular involvement.    < end of copied text >

## 2023-10-06 NOTE — PROGRESS NOTE ADULT - ASSESSMENT
62 yo M with multiple medical comorbidities with concerns for ileus vs. small bowel obstruction. He continues to endorse daily bowel movements and passing gas.     Plan:  - Due to vomiting early evening last night, his NGT was replaced  - Encourage ambulation, OOB to chair   - Continue NPO   - Continue IVF  - DVT ppx: HSQ  - Monitor flatus / BM   - Multi Pain control

## 2023-10-06 NOTE — PROGRESS NOTE ADULT - ASSESSMENT
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  Labs consistent w/ SANTOS on CKD.  Hospital course complicated by bacteremia w/ cxs growing staph epidermidus and TTE showing large MV endocarditis. Vascular consulted with concern of Infected prosthetic vascular graft,  Patient is now s/p removal of L AV graft. Was monitor in SICU and then transfer to floor. Vascular requesting transfer back to medicine service. Patient hospital course now complicated by Ileus vs SBO. General surgery was consulted. NGT was placed. Now large drainage noted  Sepsis POA 2/2 Staphylococcus epidermidis bacteremia likely endovascular in origin w/ subsequent MV IE   - Repeat BCXs from 09/27 NGTD  - LUE AV graft concerning for infectious source of bacteremia leading to IE now s/p removal on 10/1 by vascular  - Has MV/AV replacements which increases Pts risk of IE in the setting of staph epi bacteremia    - s/p MUSTAPHA w/ evidence of large MV vegetation - on Ancef per ID    CKD - Creatinine stable 1.7 - near baseline - had SANTOS     HFpEF, Afib    Htn    SDH - full resolution likely

## 2023-10-06 NOTE — CHART NOTE - NSCHARTNOTEFT_GEN_A_CORE
Patient has been nauseated for most of the day. Had recently been getting sips and is now actively vomiting. Patient distended on exam and I recommended an NGT. He declined stating that he wanted to give it a try without the tube for now. Educated the patient about the importance of bowel decompression and aspiration risk. He states that if he vomits again he will be amenable to the tube. KUB ordered, strict NPO and medicine notified. Discussed with chief.

## 2023-10-06 NOTE — PROGRESS NOTE ADULT - ASSESSMENT
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  Labs consistent w/ SANTOS on CKD.  Hospital course complicated by bacteremia w/ cxs growing staph epidermidus and TTE showing large MV endocarditis. Vascular consulted with concern of Infected prosthetic vascular graft,  Patient is now s/p removal of L AV graft. Was monitor in SICU and then transfer to floor. Vascular requesting transfer back to medicine service. Patient hospital course now complicated by Ileus vs SBO. General surgery was consulted. NGT was placed and now removed.     Sepsis POA 2/2 Staphylococcus epidermidis bacteremia likely endovascular in origin w/ subsequent MV IE   - Repeat BCXs from 09/27 NGTD  - LUE AV graft concerning for infectious source of bacteremia leading to IE now s/p removal on 10/1 by vascular  - Has MV/AV replacements which increases Pts risk of IE in the setting of staph epi bacteremia    - s/p MUSTAPHA w/ evidence of large MV vegetation   - c/w cefazolin as per ID recs   - Due to renal failure will hold off on Gentamicin  - Due to Eliquis unable to use Rifampin   - Plan for Repeat MUSTAPHA on or after 10/24/23 per ID  - CT A/P re demonstrates aortic dissection stable from 05/2023 but no acute findings reported   - CTSx consulted for consideration of re-operation of MVR   - As per CTSx will hold off on surgery for now and on contrast studies given pt high surgical risk and risk of progressing to HD again   - If cxs remain negative can tx w/ 4wks of abxs repeat MUSTAPHA to see if vegetation improving as per CTSx  - Monitor CBC and temperature    Ileus vs SBO   - NGT now re-inserted   - gen surg consult appreciated   - NPO for now  - gentle IVF     SANTOS on CKD III likely prerenal azotemia 2/2 GI losses and poor po intake  - Cr improving since admission (baseline 1.5-1.8)  - Required short term HD in recent past   - Continue to hold of on diuretic given hypovolemia   - Avoid nephrotoxic meds or renally dose if needed   - Nephrology following and recs noted     Chronic HFpEF  - Follows Dr. Frankel   - c/w Coreg  - Torsemide held in light of dehydration on admission resume on d/c or sooner if needed  - MUSTAPHA w/ LVEF 55-60%  - Monitor daily weights and I/O's  - Cardio following and recs noted    paroxysmal Atrial fibrillation   - Rate controlled currently   - CHADSVASC of 5 and as per cardiology no need to bridge   - c/w BB for rate control  - Briefly held AC when had SDH but since resumed after 07/2023 due CTH w/ complete resolution of SDH  - AC currently on hold due to recent procedure and now in light of possible SBO will hold in case needs surgical intervention   - will need to resume AC once SBO resolves    HTN  - c/w amlodipine and carvedilol     Troponemia   - Likely from poor clearance in the setting of SANTOS on CKD  - no EKG changes   - No chest pain     SDH   - CTH has shown complete resolution since July 2023    Mitral/Aortic valve replacement   - Recent MUSTAPHA shows bioprosthetic valves with only moderate paravalvular leak in MV  - Now w/ large vegetation on MV  - CTSx following but no plan for surg intervention at this time      VTE ppx: HSQ  Dispo: remains acute

## 2023-10-06 NOTE — PROGRESS NOTE ADULT - ASSESSMENT
Assessment: Patient is a 63 year-old male with PMH HTN, HLD, CHF, CKD, Type A aortic dissection, and atrial fibrillation s/p ablation; PSH sternotomy, CABG x 2, MVR, AVR, L AVG; who presented to the ED on 9/23/23 with intractable nausea and vomiting. 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 Patient is now POD 3 from removal of L AV graft in presence of infection and bacteremia.  ACS is following and assisting with management of ileus vs early/partial SBO.  NGT was removed, now with repeat n/v.    Plan:  - If vomits, recommend replacing NGT  - F/U ACS recommendations    - SQH q8h as DVT prophylaxis  - Continue cefazolin 2g IVP q8h as per infectious disease for endocarditis  - q4h neurovascular checks  - Encourage ambulation and incentive spirometry   Assessment: Patient is a 63 year-old male with PMH HTN, HLD, CHF, CKD, Type A aortic dissection, and atrial fibrillation s/p ablation; PSH sternotomy, CABG x 2, MVR, AVR, L AVG; who presented to the ED on 9/23/23 with intractable nausea and vomiting. 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 Patient is now POD 3 from removal of L AV graft in presence of infection and bacteremia.  ACS is following and assisting with management of ileus vs early/partial SBO.  NGT was removed, now with repeat n/v.    Plan:  - If vomits, recommend replacing NGT  - F/U ACS recommendations    - SQH q8h as DVT prophylaxis  - Continue cefazolin 2g IVP q8h as per infectious disease for endocarditis  - Neurovascular checks  - Encourage ambulation and incentive spirometry

## 2023-10-06 NOTE — PROGRESS NOTE ADULT - SUBJECTIVE AND OBJECTIVE BOX
Patient seen and examined    I&O's Summary    05 Oct 2023 07:01  -  06 Oct 2023 07:00  --------------------------------------------------------  IN: 1500 mL / OUT: 100 mL / NET: 1400 mL    Lethargic,frail  NGT draining fluid    REVIEW OF SYSTEMS:    CONSTITUTIONAL: No F/C  RESPIRATORY: No cough,  No SOB  CARDIOVASCULAR: No CP/palpitations,    GASTROINTESTINAL: No abdominal pain - Had vomiting   ,    Vital Signs Last 24 Hrs  T(C): 36.6 (06 Oct 2023 08:40), Max: 36.9 (06 Oct 2023 00:08)  T(F): 97.9 (06 Oct 2023 08:40), Max: 98.5 (06 Oct 2023 00:08)  HR: 70 (06 Oct 2023 08:40) (60 - 79)  BP: 181/79 (06 Oct 2023 08:40) (155/67 - 181/79)  BP(mean): --  RR: 18 (06 Oct 2023 08:40) (18 - 18)  SpO2: 95% (06 Oct 2023 08:40) (91% - 95%)    Parameters below as of 06 Oct 2023 08:40  Patient On (Oxygen Delivery Method): room air      PHYSICAL EXAM:    GENERAL: NAD,   EYES:  conjunctiva and sclera clear  NECK: Supple, No JVD/Bruit  NERVOUS SYSTEM:  Lethargic/sleepy, frail  CHEST:  No rales or rhonchi  HEART:  RRR, No murmur  ABDOMEN: Soft, NT/ND BS+, large NGD noted  EXTREMITIES:  No Edema;  SKIN: No rashes    LABS:                          11.1   6.26  )-----------( 157      ( 06 Oct 2023 06:45 )             36.8     10-06    146<H>  |  106  |  34.8<H>  ----------------------------<  124<H>  3.9   |  27.0  |  1.71<H>    Ca    8.5      06 Oct 2023 06:45    TPro  6.9  /  Alb  2.6<L>  /  TBili  0.6  /  DBili  x   /  AST  22  /  ALT  <5  /  AlkPhos  111  10-06      MEDICATIONS  (STANDING):  acetaminophen     Tablet .. PRN  aluminum hydroxide/magnesium hydroxide/simethicone Suspension PRN  amLODIPine   Tablet  atorvastatin  ceFAZolin  Injectable.  dextrose 5% + sodium chloride 0.45% with potassium chloride 10 mEq/L  heparin   Injectable  hydrALAZINE Injectable PRN  HYDROmorphone  Injectable PRN  levETIRAcetam  IVPB  melatonin PRN  metoprolol tartrate Injectable  ondansetron Injectable PRN  oxyCODONE    IR PRN  oxyCODONE    IR PRN  polyethylene glycol 3350  senna  tamsulosin

## 2023-10-06 NOTE — CHART NOTE - NSCHARTNOTEFT_GEN_A_CORE
Medicine PA-  Pt has been having frequent bouts of vomiting, but denies abdominal pain.  Saw pt and recommended NG tube again but he refused, he is A+O x4.  Pt said he needed to have BM and was walked to bathroom, nurse was notified.

## 2023-10-06 NOTE — CHART NOTE - NSCHARTNOTEFT_GEN_A_CORE
Patient continued to have episodes of vomiting but was refusing an NGT. Finally, patient was amenable and the NGT was placed without issue. CXR showed the tube needed to be pull back and it was retracted about 6-7 cm. Left on continuous suction for now. Medicine team updated.

## 2023-10-07 LAB
ANION GAP SERPL CALC-SCNC: 11 MMOL/L — SIGNIFICANT CHANGE UP (ref 5–17)
BASOPHILS # BLD AUTO: 0.05 K/UL — SIGNIFICANT CHANGE UP (ref 0–0.2)
BASOPHILS NFR BLD AUTO: 0.6 % — SIGNIFICANT CHANGE UP (ref 0–2)
BUN SERPL-MCNC: 29.1 MG/DL — HIGH (ref 8–20)
CALCIUM SERPL-MCNC: 8.4 MG/DL — SIGNIFICANT CHANGE UP (ref 8.4–10.5)
CHLORIDE SERPL-SCNC: 110 MMOL/L — HIGH (ref 96–108)
CO2 SERPL-SCNC: 27 MMOL/L — SIGNIFICANT CHANGE UP (ref 22–29)
CREAT SERPL-MCNC: 1.58 MG/DL — HIGH (ref 0.5–1.3)
CULTURE RESULTS: SIGNIFICANT CHANGE UP
EGFR: 49 ML/MIN/1.73M2 — LOW
EOSINOPHIL # BLD AUTO: 0.25 K/UL — SIGNIFICANT CHANGE UP (ref 0–0.5)
EOSINOPHIL NFR BLD AUTO: 3.1 % — SIGNIFICANT CHANGE UP (ref 0–6)
GLUCOSE BLDC GLUCOMTR-MCNC: 119 MG/DL — HIGH (ref 70–99)
GLUCOSE BLDC GLUCOMTR-MCNC: 127 MG/DL — HIGH (ref 70–99)
GLUCOSE SERPL-MCNC: 113 MG/DL — HIGH (ref 70–99)
HCT VFR BLD CALC: 34.2 % — LOW (ref 39–50)
HGB BLD-MCNC: 10.2 G/DL — LOW (ref 13–17)
IMM GRANULOCYTES NFR BLD AUTO: 1.2 % — HIGH (ref 0–0.9)
LYMPHOCYTES # BLD AUTO: 1.19 K/UL — SIGNIFICANT CHANGE UP (ref 1–3.3)
LYMPHOCYTES # BLD AUTO: 14.7 % — SIGNIFICANT CHANGE UP (ref 13–44)
MAGNESIUM SERPL-MCNC: 2.5 MG/DL — SIGNIFICANT CHANGE UP (ref 1.6–2.6)
MCHC RBC-ENTMCNC: 27.4 PG — SIGNIFICANT CHANGE UP (ref 27–34)
MCHC RBC-ENTMCNC: 29.8 GM/DL — LOW (ref 32–36)
MCV RBC AUTO: 91.9 FL — SIGNIFICANT CHANGE UP (ref 80–100)
MONOCYTES # BLD AUTO: 0.69 K/UL — SIGNIFICANT CHANGE UP (ref 0–0.9)
MONOCYTES NFR BLD AUTO: 8.5 % — SIGNIFICANT CHANGE UP (ref 2–14)
NEUTROPHILS # BLD AUTO: 5.84 K/UL — SIGNIFICANT CHANGE UP (ref 1.8–7.4)
NEUTROPHILS NFR BLD AUTO: 71.9 % — SIGNIFICANT CHANGE UP (ref 43–77)
PHOSPHATE SERPL-MCNC: 2.4 MG/DL — SIGNIFICANT CHANGE UP (ref 2.4–4.7)
PLATELET # BLD AUTO: 163 K/UL — SIGNIFICANT CHANGE UP (ref 150–400)
POTASSIUM SERPL-MCNC: 3.6 MMOL/L — SIGNIFICANT CHANGE UP (ref 3.5–5.3)
POTASSIUM SERPL-SCNC: 3.6 MMOL/L — SIGNIFICANT CHANGE UP (ref 3.5–5.3)
RBC # BLD: 3.72 M/UL — LOW (ref 4.2–5.8)
RBC # FLD: 13.7 % — SIGNIFICANT CHANGE UP (ref 10.3–14.5)
SODIUM SERPL-SCNC: 148 MMOL/L — HIGH (ref 135–145)
SPECIMEN SOURCE: SIGNIFICANT CHANGE UP
WBC # BLD: 8.12 K/UL — SIGNIFICANT CHANGE UP (ref 3.8–10.5)
WBC # FLD AUTO: 8.12 K/UL — SIGNIFICANT CHANGE UP (ref 3.8–10.5)

## 2023-10-07 PROCEDURE — 99024 POSTOP FOLLOW-UP VISIT: CPT

## 2023-10-07 PROCEDURE — 99231 SBSQ HOSP IP/OBS SF/LOW 25: CPT | Mod: GC

## 2023-10-07 PROCEDURE — 99233 SBSQ HOSP IP/OBS HIGH 50: CPT

## 2023-10-07 RX ORDER — LABETALOL HCL 100 MG
10 TABLET ORAL EVERY 8 HOURS
Refills: 0 | Status: DISCONTINUED | OUTPATIENT
Start: 2023-10-07 | End: 2023-10-10

## 2023-10-07 RX ADMIN — HEPARIN SODIUM 5000 UNIT(S): 5000 INJECTION INTRAVENOUS; SUBCUTANEOUS at 05:10

## 2023-10-07 RX ADMIN — Medication 2000 MILLIGRAM(S): at 19:16

## 2023-10-07 RX ADMIN — Medication 10 MILLIGRAM(S): at 09:06

## 2023-10-07 RX ADMIN — HEPARIN SODIUM 5000 UNIT(S): 5000 INJECTION INTRAVENOUS; SUBCUTANEOUS at 13:23

## 2023-10-07 RX ADMIN — HYDROMORPHONE HYDROCHLORIDE 1 MILLIGRAM(S): 2 INJECTION INTRAMUSCULAR; INTRAVENOUS; SUBCUTANEOUS at 21:39

## 2023-10-07 RX ADMIN — HYDROMORPHONE HYDROCHLORIDE 1 MILLIGRAM(S): 2 INJECTION INTRAMUSCULAR; INTRAVENOUS; SUBCUTANEOUS at 04:23

## 2023-10-07 RX ADMIN — Medication 2000 MILLIGRAM(S): at 11:38

## 2023-10-07 RX ADMIN — HYDROMORPHONE HYDROCHLORIDE 1 MILLIGRAM(S): 2 INJECTION INTRAMUSCULAR; INTRAVENOUS; SUBCUTANEOUS at 14:42

## 2023-10-07 RX ADMIN — Medication 2000 MILLIGRAM(S): at 04:24

## 2023-10-07 RX ADMIN — HYDROMORPHONE HYDROCHLORIDE 1 MILLIGRAM(S): 2 INJECTION INTRAMUSCULAR; INTRAVENOUS; SUBCUTANEOUS at 06:07

## 2023-10-07 RX ADMIN — LEVETIRACETAM 400 MILLIGRAM(S): 250 TABLET, FILM COATED ORAL at 05:09

## 2023-10-07 RX ADMIN — LEVETIRACETAM 400 MILLIGRAM(S): 250 TABLET, FILM COATED ORAL at 17:26

## 2023-10-07 RX ADMIN — Medication 10 MILLIGRAM(S): at 22:15

## 2023-10-07 RX ADMIN — HYDROMORPHONE HYDROCHLORIDE 1 MILLIGRAM(S): 2 INJECTION INTRAMUSCULAR; INTRAVENOUS; SUBCUTANEOUS at 07:07

## 2023-10-07 RX ADMIN — HYDROMORPHONE HYDROCHLORIDE 1 MILLIGRAM(S): 2 INJECTION INTRAMUSCULAR; INTRAVENOUS; SUBCUTANEOUS at 05:23

## 2023-10-07 RX ADMIN — DEXTROSE MONOHYDRATE, SODIUM CHLORIDE, AND POTASSIUM CHLORIDE 75 MILLILITER(S): 50; .745; 4.5 INJECTION, SOLUTION INTRAVENOUS at 20:39

## 2023-10-07 RX ADMIN — Medication 10 MILLIGRAM(S): at 14:43

## 2023-10-07 RX ADMIN — HEPARIN SODIUM 5000 UNIT(S): 5000 INJECTION INTRAVENOUS; SUBCUTANEOUS at 22:14

## 2023-10-07 RX ADMIN — HYDROMORPHONE HYDROCHLORIDE 1 MILLIGRAM(S): 2 INJECTION INTRAMUSCULAR; INTRAVENOUS; SUBCUTANEOUS at 20:39

## 2023-10-07 RX ADMIN — DEXTROSE MONOHYDRATE, SODIUM CHLORIDE, AND POTASSIUM CHLORIDE 75 MILLILITER(S): 50; .745; 4.5 INJECTION, SOLUTION INTRAVENOUS at 04:24

## 2023-10-07 NOTE — PROGRESS NOTE ADULT - SUBJECTIVE AND OBJECTIVE BOX
INTERVAL HPI/OVERNIGHT EVENTS:    Patient evaluated at bedside. No acute distress. No acute events overnight.  NGT still with high output.  Remains hemodynamically stable and afebrile.     MEDICATIONS  (STANDING):  amLODIPine   Tablet 10 milliGRAM(s) Oral daily  atorvastatin 40 milliGRAM(s) Oral at bedtime  ceFAZolin  Injectable. 2000 milliGRAM(s) IV Push every 8 hours  dextrose 5% + sodium chloride 0.45% with potassium chloride 10 mEq/L 1000 milliLiter(s) (75 mL/Hr) IV Continuous <Continuous>  heparin   Injectable 5000 Unit(s) SubCutaneous every 8 hours  levETIRAcetam  IVPB 1000 milliGRAM(s) IV Intermittent every 12 hours  polyethylene glycol 3350 17 Gram(s) Oral daily  senna 2 Tablet(s) Oral at bedtime  tamsulosin 0.4 milliGRAM(s) Oral at bedtime    MEDICATIONS  (PRN):  acetaminophen     Tablet .. 650 milliGRAM(s) Oral every 6 hours PRN Temp greater or equal to 38C (100.4F), Mild Pain (1 - 3)  aluminum hydroxide/magnesium hydroxide/simethicone Suspension 30 milliLiter(s) Oral every 4 hours PRN Dyspepsia  hydrALAZINE Injectable 10 milliGRAM(s) IV Push every 6 hours PRN SBP>170  HYDROmorphone  Injectable 0.5 milliGRAM(s) IV Push every 6 hours PRN Moderate Pain (4 - 6)  HYDROmorphone  Injectable 1 milliGRAM(s) IV Push every 4 hours PRN breakthru pain  or prior o dressing changes  HYDROmorphone  Injectable 1 milliGRAM(s) IV Push every 6 hours PRN Severe Pain (7 - 10)  melatonin 3 milliGRAM(s) Oral at bedtime PRN Insomnia  ondansetron Injectable 4 milliGRAM(s) IV Push every 8 hours PRN Nausea and/or Vomiting      Vital Signs Last 24 Hrs  T(C): 36.5 (07 Oct 2023 05:00), Max: 36.8 (07 Oct 2023 00:00)  T(F): 97.7 (07 Oct 2023 05:00), Max: 98.2 (07 Oct 2023 00:00)  HR: 67 (07 Oct 2023 05:00) (60 - 75)  BP: 160/78 (07 Oct 2023 05:00) (145/73 - 184/76)  BP(mean): --  RR: 18 (07 Oct 2023 05:00) (18 - 18)  SpO2: 92% (07 Oct 2023 05:00) (92% - 97%)    Parameters below as of 07 Oct 2023 05:00  Patient On (Oxygen Delivery Method): room air        Constitutional: NAD  HEENT: PERRL, EOMI  Neck: No JVD, FROM without pain  Respiratory: Respirations non-labored, no accessory muscle use  Gastrointestinal: Soft, non-tender, slightly distended. NGT placed  Extremities: No peripheral edema, No cyanosis  Neurological: A&O x 3; without gross deficit        I&O's Detail    05 Oct 2023 07:01  -  06 Oct 2023 07:00  --------------------------------------------------------  IN:    dextrose 5% + sodium chloride 0.45% w/ Additives: 1500 mL  Total IN: 1500 mL    OUT:    Nasogastric/Oral tube (mL): 100 mL  Total OUT: 100 mL    Total NET: 1400 mL      06 Oct 2023 07:01  -  07 Oct 2023 05:48  --------------------------------------------------------  IN:    dextrose 5% + sodium chloride 0.45% w/ Additives: 750 mL  Total IN: 750 mL    OUT:    Nasogastric/Oral tube (mL): 1650 mL  Total OUT: 1650 mL    Total NET: -900 mL          LABS:                        11.1   6.26  )-----------( 157      ( 06 Oct 2023 06:45 )             36.8     10-06    146<H>  |  106  |  34.8<H>  ----------------------------<  124<H>  3.9   |  27.0  |  1.71<H>    Ca    8.5      06 Oct 2023 06:45    TPro  6.9  /  Alb  2.6<L>  /  TBili  0.6  /  DBili  x   /  AST  22  /  ALT  <5  /  AlkPhos  111  10-06      Urinalysis Basic - ( 06 Oct 2023 06:45 )    Color: x / Appearance: x / SG: x / pH: x  Gluc: 124 mg/dL / Ketone: x  / Bili: x / Urobili: x   Blood: x / Protein: x / Nitrite: x   Leuk Esterase: x / RBC: x / WBC x   Sq Epi: x / Non Sq Epi: x / Bacteria: x        RADIOLOGY & ADDITIONAL STUDIES:

## 2023-10-07 NOTE — PROGRESS NOTE ADULT - SUBJECTIVE AND OBJECTIVE BOX
INTERVAL HPI/OVERNIGHT EVENTS:    Patient evaluated at bedside. No acute distress. No acute events overnight.    MEDICATIONS  (STANDING):  amLODIPine   Tablet 10 milliGRAM(s) Oral daily  atorvastatin 40 milliGRAM(s) Oral at bedtime  ceFAZolin  Injectable. 2000 milliGRAM(s) IV Push every 8 hours  dextrose 5% + sodium chloride 0.45% with potassium chloride 10 mEq/L 1000 milliLiter(s) (75 mL/Hr) IV Continuous <Continuous>  heparin   Injectable 5000 Unit(s) SubCutaneous every 8 hours  levETIRAcetam  IVPB 1000 milliGRAM(s) IV Intermittent every 12 hours  polyethylene glycol 3350 17 Gram(s) Oral daily  senna 2 Tablet(s) Oral at bedtime  tamsulosin 0.4 milliGRAM(s) Oral at bedtime    MEDICATIONS  (PRN):  acetaminophen     Tablet .. 650 milliGRAM(s) Oral every 6 hours PRN Temp greater or equal to 38C (100.4F), Mild Pain (1 - 3)  aluminum hydroxide/magnesium hydroxide/simethicone Suspension 30 milliLiter(s) Oral every 4 hours PRN Dyspepsia  hydrALAZINE Injectable 10 milliGRAM(s) IV Push every 6 hours PRN SBP>170  HYDROmorphone  Injectable 0.5 milliGRAM(s) IV Push every 6 hours PRN Moderate Pain (4 - 6)  HYDROmorphone  Injectable 1 milliGRAM(s) IV Push every 4 hours PRN breakthru pain  or prior o dressing changes  HYDROmorphone  Injectable 1 milliGRAM(s) IV Push every 6 hours PRN Severe Pain (7 - 10)  melatonin 3 milliGRAM(s) Oral at bedtime PRN Insomnia  ondansetron Injectable 4 milliGRAM(s) IV Push every 8 hours PRN Nausea and/or Vomiting      Vital Signs Last 24 Hrs  T(C): 36.8 (07 Oct 2023 00:00), Max: 36.8 (07 Oct 2023 00:00)  T(F): 98.2 (07 Oct 2023 00:00), Max: 98.2 (07 Oct 2023 00:00)  HR: 60 (07 Oct 2023 00:00) (60 - 79)  BP: 154/72 (07 Oct 2023 00:00) (145/73 - 184/76)  BP(mean): --  RR: 18 (07 Oct 2023 00:00) (18 - 18)  SpO2: 95% (07 Oct 2023 00:00) (93% - 97%)    Parameters below as of 07 Oct 2023 00:00  Patient On (Oxygen Delivery Method): room air          Physical Exam:  General: NAD, patient is dry heaving during exam  Pulmonary: Nonlabored breathing  Cardiovascular: RRR  Abdominal: soft, mildly distended, nt  Extremities: LUE wound with wet to dry dressing, healing well  No purulent drainage or strikethrough on dressing noted.   Vascular: palp ulnar signals      I&O's Detail    05 Oct 2023 07:01  -  06 Oct 2023 07:00  --------------------------------------------------------  IN:    dextrose 5% + sodium chloride 0.45% w/ Additives: 1500 mL  Total IN: 1500 mL    OUT:    Nasogastric/Oral tube (mL): 100 mL  Total OUT: 100 mL    Total NET: 1400 mL      06 Oct 2023 07:01  -  07 Oct 2023 00:56  --------------------------------------------------------  IN:  Total IN: 0 mL    OUT:    Nasogastric/Oral tube (mL): 750 mL  Total OUT: 750 mL    Total NET: -750 mL          LABS:                        11.1   6.26  )-----------( 157      ( 06 Oct 2023 06:45 )             36.8     10-06    146<H>  |  106  |  34.8<H>  ----------------------------<  124<H>  3.9   |  27.0  |  1.71<H>    Ca    8.5      06 Oct 2023 06:45    TPro  6.9  /  Alb  2.6<L>  /  TBili  0.6  /  DBili  x   /  AST  22  /  ALT  <5  /  AlkPhos  111  10-06      Urinalysis Basic - ( 06 Oct 2023 06:45 )    Color: x / Appearance: x / SG: x / pH: x  Gluc: 124 mg/dL / Ketone: x  / Bili: x / Urobili: x   Blood: x / Protein: x / Nitrite: x   Leuk Esterase: x / RBC: x / WBC x   Sq Epi: x / Non Sq Epi: x / Bacteria: x        RADIOLOGY & ADDITIONAL STUDIES:

## 2023-10-07 NOTE — PROGRESS NOTE ADULT - ASSESSMENT
64 yo M with multiple medical comorbidities with concerns for ileus vs. small bowel obstruction. He continues to endorse daily bowel movements and passing gas.   NGT output still high    Plan:  - Avoid narcotics  - Optimize electrolytes  - Encourage ambulation, OOB to chair   - Continue NPO   - Continue IVF  - DVT ppx: HSQ  - Monitor flatus / BM   - Multi Pain control

## 2023-10-07 NOTE — PROGRESS NOTE ADULT - SUBJECTIVE AND OBJECTIVE BOX
Patient is a 63y old  Male who presents with a chief complaint of Intractable N/V, SANTOS (07 Oct 2023 05:47)      INTERVAL HPI/OVERNIGHT EVENTS: seen and examined. No complains. NG tube in place with significant output. Reports passing gas     MEDICATIONS  (STANDING):  amLODIPine   Tablet 10 milliGRAM(s) Oral daily  atorvastatin 40 milliGRAM(s) Oral at bedtime  ceFAZolin  Injectable. 2000 milliGRAM(s) IV Push every 8 hours  dextrose 5% + sodium chloride 0.45% with potassium chloride 10 mEq/L 1000 milliLiter(s) (75 mL/Hr) IV Continuous <Continuous>  heparin   Injectable 5000 Unit(s) SubCutaneous every 8 hours  levETIRAcetam  IVPB 1000 milliGRAM(s) IV Intermittent every 12 hours  polyethylene glycol 3350 17 Gram(s) Oral daily  senna 2 Tablet(s) Oral at bedtime  tamsulosin 0.4 milliGRAM(s) Oral at bedtime    MEDICATIONS  (PRN):  acetaminophen     Tablet .. 650 milliGRAM(s) Oral every 6 hours PRN Temp greater or equal to 38C (100.4F), Mild Pain (1 - 3)  aluminum hydroxide/magnesium hydroxide/simethicone Suspension 30 milliLiter(s) Oral every 4 hours PRN Dyspepsia  hydrALAZINE Injectable 10 milliGRAM(s) IV Push every 6 hours PRN SBP>170  HYDROmorphone  Injectable 0.5 milliGRAM(s) IV Push every 6 hours PRN Moderate Pain (4 - 6)  HYDROmorphone  Injectable 1 milliGRAM(s) IV Push every 4 hours PRN breakthru pain  or prior o dressing changes  HYDROmorphone  Injectable 1 milliGRAM(s) IV Push every 6 hours PRN Severe Pain (7 - 10)  melatonin 3 milliGRAM(s) Oral at bedtime PRN Insomnia  ondansetron Injectable 4 milliGRAM(s) IV Push every 8 hours PRN Nausea and/or Vomiting      Allergies    penicillin (Other)    Intolerances        REVIEW OF SYSTEMS:  CONSTITUTIONAL: No fever, weight loss, or fatigue  RESPIRATORY: No cough, wheezing, chills or hemoptysis; No shortness of breath  CARDIOVASCULAR: No chest pain, palpitations, dizziness, or leg swelling  GASTROINTESTINAL: No abdominal or epigastric pain. No nausea, vomiting, or hematemesis; No diarrhea or constipation. No melena or hematochezia.  NEUROLOGICAL: No headaches, memory loss, loss of strength, numbness, or tremors  MUSCULOSKELETAL: No joint pain or swelling; No muscle, back, or extremity pain      Vital Signs Last 24 Hrs  T(C): 36.7 (07 Oct 2023 08:22), Max: 36.8 (07 Oct 2023 00:00)  T(F): 98 (07 Oct 2023 08:22), Max: 98.2 (07 Oct 2023 00:00)  HR: 62 (07 Oct 2023 08:22) (60 - 75)  BP: 180/82 (07 Oct 2023 08:22) (147/85 - 184/76)  BP(mean): --  RR: 18 (07 Oct 2023 08:22) (18 - 18)  SpO2: 95% (07 Oct 2023 08:22) (92% - 97%)    Parameters below as of 07 Oct 2023 08:22  Patient On (Oxygen Delivery Method): room air        PHYSICAL EXAM:  GENERAL: NAD, laying in bed   HEAD:  Atraumatic, Normocephalic  EYES: EOMI, PERRLA, conjunctiva and sclera clear  NECK: Supple, No JVD,  NERVOUS SYSTEM:  Alert & Oriented X3, No gross focal deficits  CHEST/LUNG: Clear to percussion bilaterally; No rales, rhonchi, wheezing, or rubs  HEART: Regular rate and rhythm; No murmurs, rubs, or gallops  ABDOMEN: Soft, Nontender, Nondistended; Bowel sounds present, large midabdominal scar   EXTREMITIES:  No clubbing, cyanosis, or edema  SKIN: No rashes or lesions    LABS:                        10.2   8.12  )-----------( 163      ( 07 Oct 2023 07:48 )             34.2     10-07    148<H>  |  110<H>  |  29.1<H>  ----------------------------<  113<H>  3.6   |  27.0  |  1.58<H>    Ca    8.4      07 Oct 2023 07:48  Phos  2.4     10-07  Mg     2.5     10-07    TPro  6.9  /  Alb  2.6<L>  /  TBili  0.6  /  DBili  x   /  AST  22  /  ALT  <5  /  AlkPhos  111  10-06      Urinalysis Basic - ( 07 Oct 2023 07:48 )    Color: x / Appearance: x / SG: x / pH: x  Gluc: 113 mg/dL / Ketone: x  / Bili: x / Urobili: x   Blood: x / Protein: x / Nitrite: x   Leuk Esterase: x / RBC: x / WBC x   Sq Epi: x / Non Sq Epi: x / Bacteria: x      CAPILLARY BLOOD GLUCOSE          RADIOLOGY & ADDITIONAL TESTS:    Imaging Personally Reviewed:  [ x] YES  [ ] NO    Consultant(s) Notes Reviewed:  [x ] YES  [ ] NO    Care Discussed with Consultants/Other Providers [ ] YES  [ ] NO    Plan of Care discussed with Housestaff [ x]YES [ ] NO

## 2023-10-07 NOTE — PROGRESS NOTE ADULT - ASSESSMENT
Patient is a 63 year-old male with PMH HTN, HLD, CHF, CKD, Type A aortic dissection, and atrial fibrillation s/p ablation; PSH sternotomy, CABG x 2, MVR, AVR, L AVG; who presented to the ED on 9/23/23 with intractable nausea and vomiting. 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 Patient is now s/p removal of L AV graft in presence of infection and bacteremia.  ACS is following and assisting with management of ileus vs early/partial SBO.     Plan:  - wet to dry daily dressing  - SQH q8h as DVT prophylaxis  - Continue cefazolin 2g IVP q8h as per infectious disease for endocarditis  - Neurovascular checks  - Encourage ambulation and incentive spirometry

## 2023-10-08 LAB
ANION GAP SERPL CALC-SCNC: 11 MMOL/L — SIGNIFICANT CHANGE UP (ref 5–17)
BUN SERPL-MCNC: 21.6 MG/DL — HIGH (ref 8–20)
CALCIUM SERPL-MCNC: 8.3 MG/DL — LOW (ref 8.4–10.5)
CHLORIDE SERPL-SCNC: 107 MMOL/L — SIGNIFICANT CHANGE UP (ref 96–108)
CO2 SERPL-SCNC: 33 MMOL/L — HIGH (ref 22–29)
CREAT SERPL-MCNC: 1.5 MG/DL — HIGH (ref 0.5–1.3)
EGFR: 52 ML/MIN/1.73M2 — LOW
GLUCOSE SERPL-MCNC: 107 MG/DL — HIGH (ref 70–99)
HCT VFR BLD CALC: 37.3 % — LOW (ref 39–50)
HGB BLD-MCNC: 11.1 G/DL — LOW (ref 13–17)
MAGNESIUM SERPL-MCNC: 2.4 MG/DL — SIGNIFICANT CHANGE UP (ref 1.6–2.6)
MCHC RBC-ENTMCNC: 27.2 PG — SIGNIFICANT CHANGE UP (ref 27–34)
MCHC RBC-ENTMCNC: 29.8 GM/DL — LOW (ref 32–36)
MCV RBC AUTO: 91.4 FL — SIGNIFICANT CHANGE UP (ref 80–100)
PHOSPHATE SERPL-MCNC: 2.6 MG/DL — SIGNIFICANT CHANGE UP (ref 2.4–4.7)
PLATELET # BLD AUTO: 187 K/UL — SIGNIFICANT CHANGE UP (ref 150–400)
POTASSIUM SERPL-MCNC: 3.2 MMOL/L — LOW (ref 3.5–5.3)
POTASSIUM SERPL-SCNC: 3.2 MMOL/L — LOW (ref 3.5–5.3)
RBC # BLD: 4.08 M/UL — LOW (ref 4.2–5.8)
RBC # FLD: 14 % — SIGNIFICANT CHANGE UP (ref 10.3–14.5)
SODIUM SERPL-SCNC: 150 MMOL/L — HIGH (ref 135–145)
WBC # BLD: 10.12 K/UL — SIGNIFICANT CHANGE UP (ref 3.8–10.5)
WBC # FLD AUTO: 10.12 K/UL — SIGNIFICANT CHANGE UP (ref 3.8–10.5)

## 2023-10-08 PROCEDURE — 99233 SBSQ HOSP IP/OBS HIGH 50: CPT

## 2023-10-08 PROCEDURE — 99231 SBSQ HOSP IP/OBS SF/LOW 25: CPT | Mod: GC

## 2023-10-08 PROCEDURE — 74018 RADEX ABDOMEN 1 VIEW: CPT | Mod: 26

## 2023-10-08 RX ORDER — SODIUM CHLORIDE 9 MG/ML
1000 INJECTION, SOLUTION INTRAVENOUS
Refills: 0 | Status: DISCONTINUED | OUTPATIENT
Start: 2023-10-08 | End: 2023-10-08

## 2023-10-08 RX ORDER — LABETALOL HCL 100 MG
10 TABLET ORAL ONCE
Refills: 0 | Status: COMPLETED | OUTPATIENT
Start: 2023-10-08 | End: 2023-10-08

## 2023-10-08 RX ORDER — SODIUM CHLORIDE 9 MG/ML
1000 INJECTION, SOLUTION INTRAVENOUS
Refills: 0 | Status: DISCONTINUED | OUTPATIENT
Start: 2023-10-08 | End: 2023-10-10

## 2023-10-08 RX ORDER — POTASSIUM CHLORIDE 20 MEQ
10 PACKET (EA) ORAL ONCE
Refills: 0 | Status: COMPLETED | OUTPATIENT
Start: 2023-10-08 | End: 2023-10-08

## 2023-10-08 RX ADMIN — HYDROMORPHONE HYDROCHLORIDE 1 MILLIGRAM(S): 2 INJECTION INTRAMUSCULAR; INTRAVENOUS; SUBCUTANEOUS at 04:21

## 2023-10-08 RX ADMIN — Medication 2000 MILLIGRAM(S): at 04:13

## 2023-10-08 RX ADMIN — HYDROMORPHONE HYDROCHLORIDE 1 MILLIGRAM(S): 2 INJECTION INTRAMUSCULAR; INTRAVENOUS; SUBCUTANEOUS at 14:51

## 2023-10-08 RX ADMIN — HYDROMORPHONE HYDROCHLORIDE 1 MILLIGRAM(S): 2 INJECTION INTRAMUSCULAR; INTRAVENOUS; SUBCUTANEOUS at 20:34

## 2023-10-08 RX ADMIN — HYDROMORPHONE HYDROCHLORIDE 1 MILLIGRAM(S): 2 INJECTION INTRAMUSCULAR; INTRAVENOUS; SUBCUTANEOUS at 01:30

## 2023-10-08 RX ADMIN — Medication 1 PATCH: at 20:22

## 2023-10-08 RX ADMIN — HEPARIN SODIUM 5000 UNIT(S): 5000 INJECTION INTRAVENOUS; SUBCUTANEOUS at 22:05

## 2023-10-08 RX ADMIN — SODIUM CHLORIDE 100 MILLILITER(S): 9 INJECTION, SOLUTION INTRAVENOUS at 22:53

## 2023-10-08 RX ADMIN — HYDROMORPHONE HYDROCHLORIDE 1 MILLIGRAM(S): 2 INJECTION INTRAMUSCULAR; INTRAVENOUS; SUBCUTANEOUS at 22:16

## 2023-10-08 RX ADMIN — HYDROMORPHONE HYDROCHLORIDE 1 MILLIGRAM(S): 2 INJECTION INTRAMUSCULAR; INTRAVENOUS; SUBCUTANEOUS at 05:21

## 2023-10-08 RX ADMIN — Medication 10 MILLIGRAM(S): at 20:01

## 2023-10-08 RX ADMIN — Medication 100 MILLIEQUIVALENT(S): at 10:05

## 2023-10-08 RX ADMIN — HEPARIN SODIUM 5000 UNIT(S): 5000 INJECTION INTRAVENOUS; SUBCUTANEOUS at 05:40

## 2023-10-08 RX ADMIN — HEPARIN SODIUM 5000 UNIT(S): 5000 INJECTION INTRAVENOUS; SUBCUTANEOUS at 14:51

## 2023-10-08 RX ADMIN — SODIUM CHLORIDE 75 MILLILITER(S): 9 INJECTION, SOLUTION INTRAVENOUS at 14:52

## 2023-10-08 RX ADMIN — Medication 10 MILLIGRAM(S): at 14:52

## 2023-10-08 RX ADMIN — Medication 10 MILLIGRAM(S): at 22:15

## 2023-10-08 RX ADMIN — LEVETIRACETAM 400 MILLIGRAM(S): 250 TABLET, FILM COATED ORAL at 17:20

## 2023-10-08 RX ADMIN — Medication 10 MILLIGRAM(S): at 00:23

## 2023-10-08 RX ADMIN — DEXTROSE MONOHYDRATE, SODIUM CHLORIDE, AND POTASSIUM CHLORIDE 75 MILLILITER(S): 50; .745; 4.5 INJECTION, SOLUTION INTRAVENOUS at 11:40

## 2023-10-08 RX ADMIN — HYDROMORPHONE HYDROCHLORIDE 1 MILLIGRAM(S): 2 INJECTION INTRAMUSCULAR; INTRAVENOUS; SUBCUTANEOUS at 15:50

## 2023-10-08 RX ADMIN — HYDROMORPHONE HYDROCHLORIDE 1 MILLIGRAM(S): 2 INJECTION INTRAMUSCULAR; INTRAVENOUS; SUBCUTANEOUS at 00:30

## 2023-10-08 RX ADMIN — Medication 10 MILLIGRAM(S): at 05:40

## 2023-10-08 RX ADMIN — Medication 2000 MILLIGRAM(S): at 19:50

## 2023-10-08 RX ADMIN — LEVETIRACETAM 400 MILLIGRAM(S): 250 TABLET, FILM COATED ORAL at 05:40

## 2023-10-08 RX ADMIN — Medication 2000 MILLIGRAM(S): at 11:41

## 2023-10-08 NOTE — PROGRESS NOTE ADULT - ASSESSMENT
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 POD7 removal of L AV graft in presence of infection and bacteremia.     Plan:  - wet to dry daily dressing  - SQH q8h as DVT prophylaxis  - Continue cefazolin 2g IVP q8h as per infectious disease for endocarditis  - Neurovascular checks  - Encourage ambulation and incentive spirometry  - Rest of care per primary team

## 2023-10-08 NOTE — PROGRESS NOTE ADULT - SUBJECTIVE AND OBJECTIVE BOX
Subjective: Patient was resting in bed. Reports no pain or issues. No acute events overnight. Feels okay overall.     STATUS POST: LUE Graft excision    POST OPERATIVE DAY #: 7    MEDICATIONS  (STANDING):  ceFAZolin  Injectable. 2000 milliGRAM(s) IV Push every 8 hours  dextrose 5% + sodium chloride 0.45% with potassium chloride 10 mEq/L 1000 milliLiter(s) (75 mL/Hr) IV Continuous <Continuous>  heparin   Injectable 5000 Unit(s) SubCutaneous every 8 hours  labetalol Injectable 10 milliGRAM(s) IV Push every 8 hours  levETIRAcetam  IVPB 1000 milliGRAM(s) IV Intermittent every 12 hours  polyethylene glycol 3350 17 Gram(s) Oral daily  senna 2 Tablet(s) Oral at bedtime  tamsulosin 0.4 milliGRAM(s) Oral at bedtime    MEDICATIONS  (PRN):  acetaminophen     Tablet .. 650 milliGRAM(s) Oral every 6 hours PRN Temp greater or equal to 38C (100.4F), Mild Pain (1 - 3)  aluminum hydroxide/magnesium hydroxide/simethicone Suspension 30 milliLiter(s) Oral every 4 hours PRN Dyspepsia  HYDROmorphone  Injectable 0.5 milliGRAM(s) IV Push every 6 hours PRN Moderate Pain (4 - 6)  HYDROmorphone  Injectable 1 milliGRAM(s) IV Push every 4 hours PRN breakthru pain  or prior o dressing changes  HYDROmorphone  Injectable 1 milliGRAM(s) IV Push every 6 hours PRN Severe Pain (7 - 10)  melatonin 3 milliGRAM(s) Oral at bedtime PRN Insomnia  ondansetron Injectable 4 milliGRAM(s) IV Push every 8 hours PRN Nausea and/or Vomiting      Vital Signs Last 24 Hrs  T(C): 36.6 (08 Oct 2023 04:40), Max: 36.9 (07 Oct 2023 17:02)  T(F): 97.9 (08 Oct 2023 04:40), Max: 98.4 (07 Oct 2023 17:02)  HR: 60 (08 Oct 2023 04:40) (56 - 71)  BP: 168/79 (08 Oct 2023 04:40) (158/64 - 184/80)  BP(mean): --  RR: 18 (08 Oct 2023 04:40) (18 - 18)  SpO2: 96% (08 Oct 2023 04:40) (91% - 98%)    Parameters below as of 08 Oct 2023 04:40  Patient On (Oxygen Delivery Method): room air        Physical Exam:    Constitutional: NAD  HEENT: PERRL, NGT in place  Respiratory: Respirations non-labored, no accessory muscle use  Gastrointestinal: Soft, non-tender, mildly-distended.   Neurological: A&O x 3  Vasc: palpable ulnar pulses      LABS:                        11.1   10.12 )-----------( 187      ( 08 Oct 2023 04:10 )             37.3     10-08    150<H>  |  107  |  21.6<H>  ----------------------------<  107<H>  3.2<L>   |  33.0<H>  |  1.50<H>    Ca    8.3<L>      08 Oct 2023 04:10  Phos  2.6     10-08  Mg     2.4     10-08    TPro  6.9  /  Alb  2.6<L>  /  TBili  0.6  /  DBili  x   /  AST  22  /  ALT  <5  /  AlkPhos  111  10-06      Urinalysis Basic - ( 08 Oct 2023 04:10 )    Color: x / Appearance: x / SG: x / pH: x  Gluc: 107 mg/dL / Ketone: x  / Bili: x / Urobili: x   Blood: x / Protein: x / Nitrite: x   Leuk Esterase: x / RBC: x / WBC x   Sq Epi: x / Non Sq Epi: x / Bacteria: x        A:     Plan:   -

## 2023-10-08 NOTE — PROGRESS NOTE ADULT - ASSESSMENT
62 y/o M w/ PMH of HTN, HLD, Type A dissection w/ bowel ischemia requiring bowel resection w/ repeat sternotomy, and CABG x2, CHF (mixed systolic/diastolic), MV and AV replacement, CKD III (with short term on HD 2022), AF s/p ablation pending watchman, SDH (resolved as of July imaging) who presented to [facility] for 2wks of inability to tolerate PO intake.  Labs consistent w/ SANTOS on CKD.  Hospital course complicated by bacteremia w/ cxs growing staph epidermidus and TTE showing large MV endocarditis. Vascular consulted with concern of Infected prosthetic vascular graft,  Patient is now s/p removal of L AV graft. Was monitor in SICU and then transfer to floor. Vascular requesting transfer back to medicine service. Patient hospital course now complicated by Ileus vs SBO. General surgery was consulted. NGT was placed and now removed.     Sepsis POA 2/2 Staphylococcus epidermidis bacteremia likely endovascular in origin w/ subsequent MV IE   Afebrile, WBC normalized   - Repeat BCXs from 09/27 NGTD  - LUE AV graft concerning for infectious source of bacteremia leading to IE now s/p removal on 10/1 by vascular  - s/p MUSTAPHA w/ evidence of large MV vegetation   - c/w cefazolin as per ID recs   - Due to renal failure will hold off on Gentamicin  - Due to Eliquis unable to use Rifampin   - Plan for Repeat MUSTAPHA on or after 10/24/23 per ID  - CT A/P re demonstrates aortic dissection stable from 05/2023 but no acute findings reported   - CTSx consulted for consideration of re-operation of MVR   - As per CTSx will hold off on surgery for now and on contrast studies given pt high surgical risk and risk of progressing to HD again   - If cxs remain negative can tx w/ 4wks of abxs repeat MUSTAPHA to see if vegetation improving as per CTSx  - Monitor CBC and temperature    Ileus vs SBO   - NGT in place and still draining   Abdomen is more soft and passing gas   - NPO for now, can have some ice chips and cip of water   - gentle IVF     SANTOS on CKD III likely prerenal azotemia 2/2 GI losses and poor po intake  - Cr improving since admission (baseline 1.5-1.8)  - Required short term HD in recent past   - Continue to hold of on diuretic given hypovolemia   - Avoid nephrotoxic meds or renally dose if needed   - Nephrology following and recs noted     Chronic HFpEF  - c/w Coreg  - Torsemide held in light of dehydration on admission resume on d/c or sooner if needed  - MUSTAPHA w/ LVEF 55-60%  - Monitor daily weights and I/O's  - Cardio following and recs noted    Paroxysmal Atrial fibrillation   - Rate controlled currently   - CHADSVASC of 5 and as per cardiology no need to bridge     - AC currently on hold due to recent procedure and now in light of possible SBO will hold in case needs surgical intervention   - will need to resume AC once SBO resolves    HTN - BP not controlled   Was not able to take oral medications   c/w  Labetalol 10 mg IV q8  Amlodipine and Carvedilol on hold       Hx of SDH   - CTH has shown complete resolution since July 2023    Mitral/Aortic valve replacement   - Recent MUSTAPHA shows bioprosthetic valves with only moderate paravalvular leak in MV  - Now w/ large vegetation on MV  - CTSx following but no plan for surgical  intervention at this time      VTE ppx: HSQ  Dispo: remains acute

## 2023-10-08 NOTE — PROGRESS NOTE ADULT - SUBJECTIVE AND OBJECTIVE BOX
Patient is a 63y old  Male who presents with a chief complaint of Intractable N/V, SANTOS (07 Oct 2023 05:47)      INTERVAL HPI/OVERNIGHT EVENTS: seen and examined. No complains. NG tube in place with significant output. Reports passing gas and had bowel movement       MEDICATIONS  (STANDING):  amLODIPine   Tablet 10 milliGRAM(s) Oral daily  atorvastatin 40 milliGRAM(s) Oral at bedtime  ceFAZolin  Injectable. 2000 milliGRAM(s) IV Push every 8 hours  dextrose 5% + sodium chloride 0.45% with potassium chloride 10 mEq/L 1000 milliLiter(s) (75 mL/Hr) IV Continuous <Continuous>  heparin   Injectable 5000 Unit(s) SubCutaneous every 8 hours  levETIRAcetam  IVPB 1000 milliGRAM(s) IV Intermittent every 12 hours  polyethylene glycol 3350 17 Gram(s) Oral daily  senna 2 Tablet(s) Oral at bedtime  tamsulosin 0.4 milliGRAM(s) Oral at bedtime    MEDICATIONS  (PRN):  acetaminophen     Tablet .. 650 milliGRAM(s) Oral every 6 hours PRN Temp greater or equal to 38C (100.4F), Mild Pain (1 - 3)  aluminum hydroxide/magnesium hydroxide/simethicone Suspension 30 milliLiter(s) Oral every 4 hours PRN Dyspepsia  hydrALAZINE Injectable 10 milliGRAM(s) IV Push every 6 hours PRN SBP>170  HYDROmorphone  Injectable 0.5 milliGRAM(s) IV Push every 6 hours PRN Moderate Pain (4 - 6)  HYDROmorphone  Injectable 1 milliGRAM(s) IV Push every 4 hours PRN breakthru pain  or prior o dressing changes  HYDROmorphone  Injectable 1 milliGRAM(s) IV Push every 6 hours PRN Severe Pain (7 - 10)  melatonin 3 milliGRAM(s) Oral at bedtime PRN Insomnia  ondansetron Injectable 4 milliGRAM(s) IV Push every 8 hours PRN Nausea and/or Vomiting      Allergies    penicillin (Other)    Intolerances        REVIEW OF SYSTEMS:  CONSTITUTIONAL: No fever, weight loss, or fatigue  RESPIRATORY: No cough, wheezing, chills or hemoptysis; No shortness of breath  CARDIOVASCULAR: No chest pain, palpitations, dizziness, or leg swelling  GASTROINTESTINAL: No abdominal or epigastric pain. No nausea, vomiting, or hematemesis; No diarrhea or constipation. No melena or hematochezia.  NEUROLOGICAL: No headaches, memory loss, loss of strength, numbness, or tremors  MUSCULOSKELETAL: No joint pain or swelling; No muscle, back, or extremity pain      Vital Signs Last 24 Hrs  T(C): 36.7 (07 Oct 2023 08:22), Max: 36.8 (07 Oct 2023 00:00)  T(F): 98 (07 Oct 2023 08:22), Max: 98.2 (07 Oct 2023 00:00)  HR: 62 (07 Oct 2023 08:22) (60 - 75)  BP: 180/82 (07 Oct 2023 08:22) (147/85 - 184/76)  BP(mean): --  RR: 18 (07 Oct 2023 08:22) (18 - 18)  SpO2: 95% (07 Oct 2023 08:22) (92% - 97%)    Parameters below as of 07 Oct 2023 08:22  Patient On (Oxygen Delivery Method): room air        PHYSICAL EXAM:  GENERAL: NAD, laying in bed   HEAD:  Atraumatic, Normocephalic  EYES: EOMI, PERRLA, conjunctiva and sclera clear  NECK: Supple, No JVD,  NERVOUS SYSTEM:  Alert & Oriented X3, No gross focal deficits  CHEST/LUNG: Clear to percussion bilaterally; No rales, rhonchi, wheezing, or rubs  HEART: Regular rate and rhythm; No murmurs, rubs, or gallops  ABDOMEN: Soft, Nontender, Nondistended; Bowel sounds present, large midabdominal scar   EXTREMITIES:  No clubbing, cyanosis, or edema  SKIN: No rashes or lesions    LABS:                        10.2   8.12  )-----------( 163      ( 07 Oct 2023 07:48 )             34.2     10-07    148<H>  |  110<H>  |  29.1<H>  ----------------------------<  113<H>  3.6   |  27.0  |  1.58<H>    Ca    8.4      07 Oct 2023 07:48  Phos  2.4     10-07  Mg     2.5     10-07    TPro  6.9  /  Alb  2.6<L>  /  TBili  0.6  /  DBili  x   /  AST  22  /  ALT  <5  /  AlkPhos  111  10-06      Urinalysis Basic - ( 07 Oct 2023 07:48 )    Color: x / Appearance: x / SG: x / pH: x  Gluc: 113 mg/dL / Ketone: x  / Bili: x / Urobili: x   Blood: x / Protein: x / Nitrite: x   Leuk Esterase: x / RBC: x / WBC x   Sq Epi: x / Non Sq Epi: x / Bacteria: x      CAPILLARY BLOOD GLUCOSE          RADIOLOGY & ADDITIONAL TESTS:    Imaging Personally Reviewed:  [ x] YES  [ ] NO    Consultant(s) Notes Reviewed:  [x ] YES  [ ] NO    Care Discussed with Consultants/Other Providers [ ] YES  [ ] NO    Plan of Care discussed with Housestaff [ x]YES [ ] NO

## 2023-10-08 NOTE — PROGRESS NOTE ADULT - ATTENDING COMMENTS
Above assessment noted.  The patient was seen and examined by myself with the surgical resident.   The patient states he had a BM this morning and has been passing flatus.  He states that the pain in the abdomen is improved from yesterday.  Abdomen is soft, non distended, no mild non localizing tenderness, no guarding, no rebound.  Will continue with NPO, NGT decompression, IV hydration.  If has further improvement, might consider trial of clears then remove NGT.  If worsens or no improvement, may need further imaging and/or surgery.
OR tomorrow for graft explant
Above assessment noted.  The patient was seen and examined by myself with the surgical resident.  The patient is without nausea or vomit, still reports flatus and BM.  The NGT remains with significant output.  Abdomen is without localizing tenderness, no guarding, no rebound.  Will request Gastrogaffin to rule out obstruction prior to removing NGT.
Please see my note from the consult.  I believe graft could be the source.  There is some tenderness erythema perhaps some slight fluctuance close to the anastomosis.  It will need to be excised she will need to remain off anticoagulation we will plan for procedure Sunday versus possibly Monday.  There is no emergency patient is not septic.
clearly increase fluctuance at the anastamosis area , planning for washout ,  removal of infected graft , repair of both brachial artery and vein
pt doing well , pain improved compared to preop , surgical sites clean with less erythema , will plan for partial packing removal tomorrow  through open icisions abx per ID
s left forearm is clean.  Wounds are clean.  No purulent drainage from vascular standpoint we will continue dressing changes.
Patient seen and examined this morning.  Apparently had episodes of nausea and vomiting overnight NG tube placed.  On exam his abdomen is distended he has I believe is a skin graft over previously open abdomen he can palpate the distended bowel with likely loss of domain.  There is fecalized material coming out the NG tube.  General surgery has been called a CAT scan has been ordered and my review of the abdominal x-ray there is concern for small bowel obstruction.  Regards to left upper extremity there is less erythema he is clearly less tender portion portions of the packing were removed.  We will continue to remove the packing throughout the next few days.  Apparently patient has a history of aortic graft given his previous bacteremia as well as endocarditis hopefully there is no graft involvement there is nothing to suggest so.  General surgery consulted.
Patient with infected LUE AVG now s/p excision. Wounds healing well. Replaced WTD.
doing well , abdomen less distended , + flatus , left arm feeling better with less edema and erythema , will change dressing again tomorrow

## 2023-10-08 NOTE — PROGRESS NOTE ADULT - ASSESSMENT
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  Labs consistent w/ SANTOS on CKD.  Hospital course complicated by bacteremia w/ cxs growing staph epidermidus and TTE showing large MV endocarditis. Vascular consulted with concern of Infected prosthetic vascular graft,  Patient is now s/p removal of L AV graft. Was monitor in SICU and then transfer to floor. Vascular requesting transfer back to medicine service. Patient hospital course now complicated by Ileus vs SBO. General surgery was consulted. NGT was placed. Now large drainage noted again today ; Was much less yesterday  Sepsis POA 2/2 Staphylococcus epidermidis bacteremia likely endovascular in origin w/ subsequent MV IE   - Repeat BCXs from 09/27 NGTD  - LUE AV graft concerning for infectious source of bacteremia leading to IE now s/p removal on 10/1 by vascular  - Has MV/AV replacements which increases Pts risk of IE in the setting of staph epi bacteremia    - s/p MUSTAPHA w/ evidence of large MV vegetation - on Ancef per ID    CKD - Creatinine stable 1.7>1.5 - near baseline - had SANTOS    Nypernatremia /hypokalemia - adjust IVfs     HFpEF, Afib    Htn - add clonidine patch 0.1    SDH - full resolution likely

## 2023-10-08 NOTE — PROGRESS NOTE ADULT - SUBJECTIVE AND OBJECTIVE BOX
Patient seen and examined    Feels fine  no c/o CP SOB NV   No swelling feet    Vital Signs Last 24 Hrs  T(C): 36.6 (08 Oct 2023 17:07), Max: 36.7 (08 Oct 2023 00:12)  T(F): 97.9 (08 Oct 2023 17:07), Max: 98.1 (08 Oct 2023 00:12)  HR: 65 (08 Oct 2023 17:07) (56 - 80)  BP: 185/88 (08 Oct 2023 17:07) (158/64 - 185/88)  BP(mean): --  RR: 18 (08 Oct 2023 17:07) (18 - 18)  SpO2: 97% (08 Oct 2023 17:07) (91% - 97%)    Parameters below as of 08 Oct 2023 17:07  Patient On (Oxygen Delivery Method): room air        PHYSICAL EXAM    GENERAL: NAD,   EYES:  conjunctiva and sclera clear  NECK: Supple, No JVD/Bruit  NERVOUS SYSTEM:  A/O x3,   CHEST:  No rales, No rhonchi  HEART:  RRR, No murmur  ABDOMEN: Soft, NT/ND BS+  EXTREMITIES:  No Edema;  SKIN: No rashes    08 Oct 2023 04:10    150    |  107    |  21.6   ----------------------------<  107    3.2     |  33.0   |  1.50     Ca    8.3        08 Oct 2023 04:10  Phos  2.6       08 Oct 2023 04:10  Mg     2.4       08 Oct 2023 04:10                            11.1   10.12 )-----------( 187      ( 08 Oct 2023 04:10 )             37.3     MEDICATIONS  (STANDING):  ceFAZolin  Injectable. 2000 milliGRAM(s) IV Push every 8 hours  dextrose 5% + sodium chloride 0.45%. 1000 milliLiter(s) (75 mL/Hr) IV Continuous <Continuous>  heparin   Injectable 5000 Unit(s) SubCutaneous every 8 hours  labetalol Injectable 10 milliGRAM(s) IV Push every 8 hours  levETIRAcetam  IVPB 1000 milliGRAM(s) IV Intermittent every 12 hours  polyethylene glycol 3350 17 Gram(s) Oral daily  senna 2 Tablet(s) Oral at bedtime  tamsulosin 0.4 milliGRAM(s) Oral at bedtime

## 2023-10-08 NOTE — PROGRESS NOTE ADULT - SUBJECTIVE AND OBJECTIVE BOX
Subjective: Patient seen and evaluated at bedside. NGT in place. patient complaining of dry mouth and is requesting ice chips. Patient also states having + bowel movement + gas. Patient denies abdominal pain, N/V/D No other complaints. No overnight events.     MEDICATIONS  (STANDING):  ceFAZolin  Injectable. 2000 milliGRAM(s) IV Push every 8 hours  dextrose 5% + sodium chloride 0.45% with potassium chloride 10 mEq/L 1000 milliLiter(s) (75 mL/Hr) IV Continuous <Continuous>  heparin   Injectable 5000 Unit(s) SubCutaneous every 8 hours  labetalol Injectable 10 milliGRAM(s) IV Push every 8 hours  levETIRAcetam  IVPB 1000 milliGRAM(s) IV Intermittent every 12 hours  polyethylene glycol 3350 17 Gram(s) Oral daily  senna 2 Tablet(s) Oral at bedtime  tamsulosin 0.4 milliGRAM(s) Oral at bedtime    MEDICATIONS  (PRN):  acetaminophen     Tablet .. 650 milliGRAM(s) Oral every 6 hours PRN Temp greater or equal to 38C (100.4F), Mild Pain (1 - 3)  aluminum hydroxide/magnesium hydroxide/simethicone Suspension 30 milliLiter(s) Oral every 4 hours PRN Dyspepsia  HYDROmorphone  Injectable 0.5 milliGRAM(s) IV Push every 6 hours PRN Moderate Pain (4 - 6)  HYDROmorphone  Injectable 1 milliGRAM(s) IV Push every 4 hours PRN breakthru pain  or prior o dressing changes  HYDROmorphone  Injectable 1 milliGRAM(s) IV Push every 6 hours PRN Severe Pain (7 - 10)  melatonin 3 milliGRAM(s) Oral at bedtime PRN Insomnia  ondansetron Injectable 4 milliGRAM(s) IV Push every 8 hours PRN Nausea and/or Vomiting      Vital Signs Last 24 Hrs  T(C): 36.7 (08 Oct 2023 00:12), Max: 36.9 (07 Oct 2023 17:02)  T(F): 98.1 (08 Oct 2023 00:12), Max: 98.4 (07 Oct 2023 17:02)  HR: 56 (08 Oct 2023 01:37) (56 - 71)  BP: 158/64 (08 Oct 2023 01:37) (158/64 - 184/80)  BP(mean): --  RR: 18 (08 Oct 2023 00:12) (18 - 18)  SpO2: 94% (08 Oct 2023 00:12) (91% - 98%)    Parameters below as of 08 Oct 2023 00:12  Patient On (Oxygen Delivery Method): room air        Physical Exam:  Constitutional: NAD  HEENT: PERRL, EOMI  Neck: No JVD, FROM without pain  Respiratory: Respirations non-labored, no accessory muscle use  Gastrointestinal: Soft, non-tender, slightly distended. NGT placed  Extremities: No peripheral edema, No cyanosis  Neurological: A&O x 3; without gross deficit    LABS: Pending                Assessment   64 yo M with multiple medical comorbidities with concerns for ileus vs. small bowel obstruction. He continues to endorse daily bowel movements and passing gas.   NGT output still high. requesting ice chips + bowel movement.     Plan:  - Avoid narcotics  - Optimize electrolytes  - Encourage ambulation, OOB to chair   - Continue NPO   - Continue IVF  - DVT ppx: HSQ  - Monitor flatus / BM   - Multi Pain control

## 2023-10-09 LAB
ALBUMIN SERPL ELPH-MCNC: 2.6 G/DL — LOW (ref 3.3–5.2)
ALP SERPL-CCNC: 116 U/L — SIGNIFICANT CHANGE UP (ref 40–120)
ALT FLD-CCNC: <5 U/L — SIGNIFICANT CHANGE UP
ANION GAP SERPL CALC-SCNC: 7 MMOL/L — SIGNIFICANT CHANGE UP (ref 5–17)
AST SERPL-CCNC: 20 U/L — SIGNIFICANT CHANGE UP
BASOPHILS # BLD AUTO: 0.07 K/UL — SIGNIFICANT CHANGE UP (ref 0–0.2)
BASOPHILS NFR BLD AUTO: 0.6 % — SIGNIFICANT CHANGE UP (ref 0–2)
BILIRUB SERPL-MCNC: 0.4 MG/DL — SIGNIFICANT CHANGE UP (ref 0.4–2)
BUN SERPL-MCNC: 16.9 MG/DL — SIGNIFICANT CHANGE UP (ref 8–20)
CALCIUM SERPL-MCNC: 8.3 MG/DL — LOW (ref 8.4–10.5)
CHLORIDE SERPL-SCNC: 110 MMOL/L — HIGH (ref 96–108)
CO2 SERPL-SCNC: 30 MMOL/L — HIGH (ref 22–29)
CREAT SERPL-MCNC: 1.38 MG/DL — HIGH (ref 0.5–1.3)
EGFR: 58 ML/MIN/1.73M2 — LOW
EOSINOPHIL # BLD AUTO: 0.28 K/UL — SIGNIFICANT CHANGE UP (ref 0–0.5)
EOSINOPHIL NFR BLD AUTO: 2.4 % — SIGNIFICANT CHANGE UP (ref 0–6)
GLUCOSE SERPL-MCNC: 95 MG/DL — SIGNIFICANT CHANGE UP (ref 70–99)
HCT VFR BLD CALC: 37.6 % — LOW (ref 39–50)
HGB BLD-MCNC: 11.2 G/DL — LOW (ref 13–17)
IMM GRANULOCYTES NFR BLD AUTO: 0.9 % — SIGNIFICANT CHANGE UP (ref 0–0.9)
LYMPHOCYTES # BLD AUTO: 1.38 K/UL — SIGNIFICANT CHANGE UP (ref 1–3.3)
LYMPHOCYTES # BLD AUTO: 11.6 % — LOW (ref 13–44)
MAGNESIUM SERPL-MCNC: 2.3 MG/DL — SIGNIFICANT CHANGE UP (ref 1.8–2.6)
MCHC RBC-ENTMCNC: 27.7 PG — SIGNIFICANT CHANGE UP (ref 27–34)
MCHC RBC-ENTMCNC: 29.8 GM/DL — LOW (ref 32–36)
MCV RBC AUTO: 93.1 FL — SIGNIFICANT CHANGE UP (ref 80–100)
MONOCYTES # BLD AUTO: 1.02 K/UL — HIGH (ref 0–0.9)
MONOCYTES NFR BLD AUTO: 8.6 % — SIGNIFICANT CHANGE UP (ref 2–14)
NEUTROPHILS # BLD AUTO: 8.99 K/UL — HIGH (ref 1.8–7.4)
NEUTROPHILS NFR BLD AUTO: 75.9 % — SIGNIFICANT CHANGE UP (ref 43–77)
PLATELET # BLD AUTO: 158 K/UL — SIGNIFICANT CHANGE UP (ref 150–400)
POTASSIUM SERPL-MCNC: 3.3 MMOL/L — LOW (ref 3.5–5.3)
POTASSIUM SERPL-SCNC: 3.3 MMOL/L — LOW (ref 3.5–5.3)
PROT SERPL-MCNC: 6.6 G/DL — SIGNIFICANT CHANGE UP (ref 6.6–8.7)
RBC # BLD: 4.04 M/UL — LOW (ref 4.2–5.8)
RBC # FLD: 14.2 % — SIGNIFICANT CHANGE UP (ref 10.3–14.5)
SODIUM SERPL-SCNC: 147 MMOL/L — HIGH (ref 135–145)
WBC # BLD: 11.85 K/UL — HIGH (ref 3.8–10.5)
WBC # FLD AUTO: 11.85 K/UL — HIGH (ref 3.8–10.5)

## 2023-10-09 PROCEDURE — 99232 SBSQ HOSP IP/OBS MODERATE 35: CPT

## 2023-10-09 PROCEDURE — 74018 RADEX ABDOMEN 1 VIEW: CPT | Mod: 26

## 2023-10-09 PROCEDURE — 99233 SBSQ HOSP IP/OBS HIGH 50: CPT

## 2023-10-09 RX ORDER — POTASSIUM CHLORIDE 20 MEQ
40 PACKET (EA) ORAL ONCE
Refills: 0 | Status: COMPLETED | OUTPATIENT
Start: 2023-10-09 | End: 2023-10-09

## 2023-10-09 RX ORDER — POTASSIUM CHLORIDE 20 MEQ
40 PACKET (EA) ORAL ONCE
Refills: 0 | Status: DISCONTINUED | OUTPATIENT
Start: 2023-10-09 | End: 2023-10-09

## 2023-10-09 RX ADMIN — HYDROMORPHONE HYDROCHLORIDE 1 MILLIGRAM(S): 2 INJECTION INTRAMUSCULAR; INTRAVENOUS; SUBCUTANEOUS at 01:46

## 2023-10-09 RX ADMIN — HYDROMORPHONE HYDROCHLORIDE 1 MILLIGRAM(S): 2 INJECTION INTRAMUSCULAR; INTRAVENOUS; SUBCUTANEOUS at 05:42

## 2023-10-09 RX ADMIN — HYDROMORPHONE HYDROCHLORIDE 1 MILLIGRAM(S): 2 INJECTION INTRAMUSCULAR; INTRAVENOUS; SUBCUTANEOUS at 04:42

## 2023-10-09 RX ADMIN — Medication 10 MILLIGRAM(S): at 21:02

## 2023-10-09 RX ADMIN — Medication 40 MILLIEQUIVALENT(S): at 18:05

## 2023-10-09 RX ADMIN — Medication 2000 MILLIGRAM(S): at 04:41

## 2023-10-09 RX ADMIN — Medication 1 PATCH: at 06:22

## 2023-10-09 RX ADMIN — HYDROMORPHONE HYDROCHLORIDE 1 MILLIGRAM(S): 2 INJECTION INTRAMUSCULAR; INTRAVENOUS; SUBCUTANEOUS at 11:22

## 2023-10-09 RX ADMIN — HYDROMORPHONE HYDROCHLORIDE 1 MILLIGRAM(S): 2 INJECTION INTRAMUSCULAR; INTRAVENOUS; SUBCUTANEOUS at 22:38

## 2023-10-09 RX ADMIN — Medication 10 MILLIGRAM(S): at 05:16

## 2023-10-09 RX ADMIN — POLYETHYLENE GLYCOL 3350 17 GRAM(S): 17 POWDER, FOR SOLUTION ORAL at 11:22

## 2023-10-09 RX ADMIN — Medication 2000 MILLIGRAM(S): at 11:23

## 2023-10-09 RX ADMIN — SODIUM CHLORIDE 100 MILLILITER(S): 9 INJECTION, SOLUTION INTRAVENOUS at 09:50

## 2023-10-09 RX ADMIN — HEPARIN SODIUM 5000 UNIT(S): 5000 INJECTION INTRAVENOUS; SUBCUTANEOUS at 21:01

## 2023-10-09 RX ADMIN — TAMSULOSIN HYDROCHLORIDE 0.4 MILLIGRAM(S): 0.4 CAPSULE ORAL at 21:01

## 2023-10-09 RX ADMIN — SODIUM CHLORIDE 100 MILLILITER(S): 9 INJECTION, SOLUTION INTRAVENOUS at 22:55

## 2023-10-09 RX ADMIN — HYDROMORPHONE HYDROCHLORIDE 1 MILLIGRAM(S): 2 INJECTION INTRAMUSCULAR; INTRAVENOUS; SUBCUTANEOUS at 23:38

## 2023-10-09 RX ADMIN — LEVETIRACETAM 400 MILLIGRAM(S): 250 TABLET, FILM COATED ORAL at 05:15

## 2023-10-09 RX ADMIN — Medication 2000 MILLIGRAM(S): at 20:55

## 2023-10-09 RX ADMIN — HEPARIN SODIUM 5000 UNIT(S): 5000 INJECTION INTRAVENOUS; SUBCUTANEOUS at 14:01

## 2023-10-09 RX ADMIN — HEPARIN SODIUM 5000 UNIT(S): 5000 INJECTION INTRAVENOUS; SUBCUTANEOUS at 05:16

## 2023-10-09 RX ADMIN — LEVETIRACETAM 400 MILLIGRAM(S): 250 TABLET, FILM COATED ORAL at 18:05

## 2023-10-09 RX ADMIN — HYDROMORPHONE HYDROCHLORIDE 1 MILLIGRAM(S): 2 INJECTION INTRAMUSCULAR; INTRAVENOUS; SUBCUTANEOUS at 06:58

## 2023-10-09 RX ADMIN — HYDROMORPHONE HYDROCHLORIDE 1 MILLIGRAM(S): 2 INJECTION INTRAMUSCULAR; INTRAVENOUS; SUBCUTANEOUS at 07:30

## 2023-10-09 RX ADMIN — Medication 10 MILLIGRAM(S): at 14:00

## 2023-10-09 NOTE — PROGRESS NOTE ADULT - SUBJECTIVE AND OBJECTIVE BOX
SUBJECTIVE / 24H EVENTS: Patient was seen and examined at bedside. Patient pulled out NGT this A/M. Patient states having +BM and +Gas. Denies abdominal pain, N/V/D and any other complaints.       MEDICATIONS  (STANDING):  ceFAZolin  Injectable. 2000 milliGRAM(s) IV Push every 8 hours  cloNIDine Patch 0.1 mG/24Hr(s) 1 patch Transdermal every 7 days  dextrose 5% + sodium chloride 0.3% 1000 milliLiter(s) (100 mL/Hr) IV Continuous <Continuous>  heparin   Injectable 5000 Unit(s) SubCutaneous every 8 hours  labetalol Injectable 10 milliGRAM(s) IV Push every 8 hours  levETIRAcetam  IVPB 1000 milliGRAM(s) IV Intermittent every 12 hours  polyethylene glycol 3350 17 Gram(s) Oral daily  senna 2 Tablet(s) Oral at bedtime  tamsulosin 0.4 milliGRAM(s) Oral at bedtime    MEDICATIONS  (PRN):  acetaminophen     Tablet .. 650 milliGRAM(s) Oral every 6 hours PRN Temp greater or equal to 38C (100.4F), Mild Pain (1 - 3)  aluminum hydroxide/magnesium hydroxide/simethicone Suspension 30 milliLiter(s) Oral every 4 hours PRN Dyspepsia  HYDROmorphone  Injectable 0.5 milliGRAM(s) IV Push every 6 hours PRN Moderate Pain (4 - 6)  HYDROmorphone  Injectable 1 milliGRAM(s) IV Push every 4 hours PRN breakthru pain  or prior o dressing changes  HYDROmorphone  Injectable 1 milliGRAM(s) IV Push every 6 hours PRN Severe Pain (7 - 10)  melatonin 3 milliGRAM(s) Oral at bedtime PRN Insomnia  ondansetron Injectable 4 milliGRAM(s) IV Push every 8 hours PRN Nausea and/or Vomiting      Vital Signs Last 24 Hrs  T(C): 36.8 (09 Oct 2023 04:52), Max: 36.8 (09 Oct 2023 04:52)  T(F): 98.2 (09 Oct 2023 04:52), Max: 98.2 (09 Oct 2023 04:52)  HR: 68 (09 Oct 2023 04:52) (56 - 80)  BP: 178/68 (09 Oct 2023 04:52) (164/81 - 188/85)  BP(mean): --  RR: 18 (09 Oct 2023 04:52) (17 - 18)  SpO2: 97% (09 Oct 2023 04:52) (95% - 99%)    Parameters below as of 09 Oct 2023 04:52  Patient On (Oxygen Delivery Method): room air        Constitutional: NAD  HEENT: PERRL, EOMI   Respiratory: respirations are unlabored, no accessory muscle use  Cardiovascular: regular rate & rhythm  Gastrointestinal: Abdomen soft, non-tender, slightly distended, no rebound tenderness / guarding  Neurological: GCS: 15, A&O x 3; no gross sensory / motor / coordination deficits  Musculoskeletal: No joint pain, swelling or deformity; no limitation of movement      I&O's Detail    08 Oct 2023 07:01  -  09 Oct 2023 07:00  --------------------------------------------------------  IN:    dextrose 5% + sodium chloride 0.45%: 600 mL  Total IN: 600 mL    OUT:  Total OUT: 0 mL    Total NET: 600 mL          LABS:                        11.2   11.85 )-----------( 158      ( 09 Oct 2023 05:42 )             37.6     10-09    147<H>  |  110<H>  |  16.9  ----------------------------<  95  3.3<L>   |  30.0<H>  |  1.38<H>    Ca    8.3<L>      09 Oct 2023 05:42  Phos  2.6     10-08  Mg     2.3     10-09    TPro  6.6  /  Alb  2.6<L>  /  TBili  0.4  /  DBili  x   /  AST  20  /  ALT  <5  /  AlkPhos  116  10-09      Urinalysis Basic - ( 09 Oct 2023 05:42 )    Color: x / Appearance: x / SG: x / pH: x  Gluc: 95 mg/dL / Ketone: x  / Bili: x / Urobili: x   Blood: x / Protein: x / Nitrite: x   Leuk Esterase: x / RBC: x / WBC x   Sq Epi: x / Non Sq Epi: x / Bacteria: x

## 2023-10-09 NOTE — PROGRESS NOTE ADULT - ASSESSMENT
· Assessment	  A: 62 yo M with PMH HTN, HLD, CHF, CKD, Type A aortic dissection, and atrial fibrillation s/p ablation; PSH sternotomy, CABG x 2, MVR, AVR, L AVG;  Patient is POD 8 removal of infected L AV graft. Post op course complicated by ileus; now resolved and NG tube has since been removed. Wound appears to be healing well.     Plan:  - Continue daily wound care with wet to dry dressing changes  - SQH q8h as DVT prophylaxis  - Continue cefazolin 2g IVP q8h as per infectious disease for endocarditis  - Encourage ambulation and incentive spirometry

## 2023-10-09 NOTE — PROGRESS NOTE ADULT - ASSESSMENT
62 yo male with multiple medical comorbidities with concerns for ileus vs small bowel obstruction. He continues to have bowel movements and passing gas. Patient pulled out NGT this morning.   Plan:  - Encourage ambulation, OOB to chair   - Continue NPO  - Continue IVF   - DVT ppx: HSQ  - Monitor flatus/BM  - pain control  64 yo male with multiple medical comorbidities with concerns for ileus vs small bowel obstruction. He continues to have bowel movements and passing gas. Patient pulled out NGT this morning.   Plan:  - F/U KUB  - Encourage ambulation, OOB to chair   - Continue NPO  - Continue IVF   - DVT ppx: HSQ  - Monitor flatus/BM  - pain control  64 yo male with multiple medical comorbidities with concerns for ileus vs small bowel obstruction. He continues to have bowel movements and passing gas. Patient pulled out NGT this morning.   Plan:  - F/U KUB  - Encourage ambulation, OOB to chair   - Advance to clears  - Continue IVF   - DVT ppx: HSQ  - Monitor flatus/BM  - pain control

## 2023-10-09 NOTE — PROGRESS NOTE ADULT - NS ATTEND AMEND GEN_ALL_CORE FT
I have seen and examined this patient with the surgical team.  No acute events overnight.  Patient appears well this morning.  Endorses abdominal pain.  Denies nausea/vomiting.  Endorses flatus/BMs.  Abdomen soft but TTP on exam.  Will place NGT if he vomits.  Please keep NPO for now.
I have seen and examined this patient with the surgical team.  No acute events overnight.  Patient appears well this morning.  NGT replaced, now with 600cc in the canister.  Patient states he is feeling slightly improved after NGT.  Still passing flatus and BMs.  Will keep NPO and with NGT for now until output improves.  No signs of peritonitis on exam, abdomen less distended.
I have seen and examined this patient with the surgical team.  No acute events overnight.  Patient appears well this morning.  Denies abdominal pain.  Denies nausea/vomiting.  Passing flatus without BMs.    Exam shows distended abdomen without signs of peritonitis.  NGT removed as it was clogged.  Will DC NGT and observe for now.
63-year-old male with extensive surgical history including small bowel resection admitted with AVF infection found to have ileus vs SBO   - NGT out this AM. Reports having bowel function and tolerating sips of water/ice chips  - Advance to sips of clears  - OOB/ambulation   - Monitor abdominal exam and bowel function
Patient seen and examined by me. Understands MUSTAPHA findings. Understands plan for antibiotics and ongoing CTS eval. Has concerns of fistula area and is going for scan on this area.    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  Chest- Bilateral Clear BS  Cardiac- S1 and S2, mid diastolic murmur  Abdomen- Soft      MUSTAPHA personally reviewed. Large bioprosthetic mitral valve vegetation. Tenuous and complex situation. Antibiotics per ID team. CTS evaluation underway for candidacy. Vascular surgery evaluation for left AVF which seems to be bothering the patient. No obvious source of infection at present.    Will require heart team approach to mitral valve endocarditis. At time of evaluation patient is warm and euvolemic. He denies chest pressure/pain, near syncope or syncope.    We are onboard for heart team approach for this pleasant gentleman.    I have discussed my recommendation with the ACP which are outlined above.  Thank you for allowing me to participate in care of your patient.   Will follow.
T(C): 37.2 (09-27-23 @ 09:55), Max: 37.2 (09-27-23 @ 04:32)  HR: 60 (09-27-23 @ 13:18) (58 - 70)  BP: 113/60 (09-27-23 @ 13:18) (109/55 - 140/61)  RR: 14 (09-27-23 @ 12:48) (14 - 18)  SpO2: 96% (09-27-23 @ 12:48) (94% - 97%)  Patient alert and awake.  Chest- Bilateral Clear BS  Cardiac- S1 and S2  Abdomen- Soft    Assessment/Plan:  1. MV Endocarditis    Patient seen by CTS and ID    I have discussed my recommendation with the PA which are outlined above.  Will follow.
Patient seen and examined by me. Offers no complaints.     Left AV fistula infection  bacteremia  septicemia  endocarditis, bacterial  bioprosthetic mitral valve endocarditis      T(C): 36.8 (09-29-23 @ 12:28), Max: 36.8 (09-28-23 @ 16:07)  HR: 70 (09-29-23 @ 12:28) (59 - 70)  BP: 147/66 (09-29-23 @ 12:28) (112/55 - 147/66)  RR: 18 (09-29-23 @ 12:28) (18 - 18)  SpO2: 97% (09-29-23 @ 12:28) (97% - 97%)  Patient alert and awake.  Chest- Bilateral Clear BS  Cardiac- S1 and S2, diastolic murmur noted.  Abdomen- Soft      warm and euvolemic  Noted workup of AV fistula has yielded likely source. Vascular surgery planning for intervention.    PREOPERATIVE CLEARANCE:  Patient may be planned for further surgical intervention by vascular surgery . Patient has the following significant co-morbidites and cardiac history that has been summarized and noted in chart. He has active bacterial endocarditis. He examines warm and euvolemic. Denies any symptoms concerning for ACS.    ACC/AHA guideline summary: Cardiac risk stratification for noncardiac surgical procedures    High risk (reported risk of cardiac death or nonfatal myocardial infarction >5%)  Aortic and other major vascular surgery  Peripheral artery surgery    Patient is a RCRI class IV risk for mirna and postoperative cardiac complications. We would recommend postoperative EKG, continuation of preoperative beta blocker therapy. Optimal electrolyte management, Would recommend resuming anticoagulation as soon as possible post operatively as per surgery. Bridging in not indicated currently given [id number].  There is no evidence of active ACS, decompensated HF, uncontrolled arrhythmias at present. EKG is non-ischemic.    The patient is at a elevated risk to proceed with planned vascular surgery.  Additional CV testing is not indicated as it would not  mirna-op    I have discussed my recommendation with the ACP which are outlined above.

## 2023-10-09 NOTE — PROGRESS NOTE ADULT - ASSESSMENT
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 Patient presented to [facility] for 2 weeks of inability to tolerate oral intake. Denies any fever or chills. Has chronic diarrhea due to bowel resection. In the ER patient was afebrile. Found to have leukocytosis to 23k. Patient was found to have SANTOS. Had a febrile episode to 102F on 9/25. Started on Vancomycin and Meropenem. Blood cultures 1 of 4 bottles with Staphylococcus epidermidis      Prosthetic MV endocarditis   Staphylococcus epidermidis bacteremia  Infected thrombosed Graft   s/p Left AV graft removal 10/1/23  Fever  Leukocytosis   SANTOS   PCN allergy   Ileus     - Blood cultures 9/23, 9/25 reporting Staphylococcus epidermidis  - Repeat blood cultures  9/27 no growth   - RVP/COVID 19 PCR 9/25 negative   - CT A/P 9/23 reporting no acute findings   - CXR with ileus on 10/3  - TTE reporting MV veg  - MUSTAPHA reporting large mobile density MV  - UA 9/25 negative for UTI   - PCN allergy but has tolerated Zosyn and Zinacef in the past.   - Vascular surgery following for thrombosed graft. s/p Left AV graft removal 10/1/23  - Continue Cefazolin   - Due to renal failure will hold off on Gentamicin  - Due to Eliquis unable to use Rifampin   - Plan for Repeat MUSTAPHA on or after 10/24/23  - Follow up cultures  - Trend Fever  - Trend WBC      Will Follow    Discussed treatment plan with: Dr Snider

## 2023-10-09 NOTE — PROGRESS NOTE ADULT - SUBJECTIVE AND OBJECTIVE BOX
[facility]  INFECTIOUS DISEASES at [place] / [place] / [place]  =======================================================                               Roosevelt Victor MD#   Fabián Quigley MD*                             Valentine Handley MD*   Reba Catalan MD*                              Professor Emeritus:  Dr Johnny Hathaway MD^            Diplomates American Board of Internal Medicine & Infectious Diseases                # [place] Office - Appt - Tel  [phone] Fax [phone]                * [place] Office - Appt - Tel [phone] Fax [phone]                      ^[place] Office - Tel  [phone] Fax [phone]                                  Hospital Consult line:  [phone]  =======================================================    JONATHAN GIBSON [id number]    Follow up: MV Endocarditis     s/p Left AV graft removal 10/1/23    no fevers   s/p NGT    Allergies:  penicillin (Other)       REVIEW OF SYSTEMS:  CONSTITUTIONAL:  No Fever or chills  HEENT:  No diplopia or blurred vision.  No earache, sore throat or runny nose.  CARDIOVASCULAR:  No chest pain  RESPIRATORY:  No cough, shortness of breath  GASTROINTESTINAL:  No nausea or vomiting.  GENITOURINARY:  No dysuria, frequency or urgency. No Blood in urine  MUSCULOSKELETAL:  no joint aches, no muscle pain  SKIN:  No change in skin, hair or nails.  NEUROLOGIC:  No Headaches      Physical Exam:  GEN: NAD  HEENT: normocephalic and atraumatic. EOMI. PERRL.    NECK: Supple.   LUNGS: CTA B/L.  HEART: RRR  ABDOMEN: Soft, NT.  +BS.  Non-distended   : No CVA tenderness  EXTREMITIES: Without  edema.  MSK: No joint swelling  NEUROLOGIC: No Focal Deficits       Vitals:  T(F): 98 (09 Oct 2023 13:02), Max: 98.2 (09 Oct 2023 04:52)  HR: 76 (09 Oct 2023 13:02)  BP: 138/69 (09 Oct 2023 13:02)  RR: 18 (09 Oct 2023 13:02)  SpO2: 96% (09 Oct 2023 13:02) (95% - 99%)  temp max in last 48H T(F): , Max: 98.4 (10-07-23 @ 17:02)    Current Antibiotics:  ceFAZolin  Injectable. 2000 milliGRAM(s) IV Push every 8 hours    Other medications:  cloNIDine Patch 0.1 mG/24Hr(s) 1 patch Transdermal every 7 days  dextrose 5% + sodium chloride 0.3% 1000 milliLiter(s) IV Continuous <Continuous>  heparin   Injectable 5000 Unit(s) SubCutaneous every 8 hours  labetalol Injectable 10 milliGRAM(s) IV Push every 8 hours  levETIRAcetam  IVPB 1000 milliGRAM(s) IV Intermittent every 12 hours  polyethylene glycol 3350 17 Gram(s) Oral daily  senna 2 Tablet(s) Oral at bedtime  tamsulosin 0.4 milliGRAM(s) Oral at bedtime                            11.2   11.85 )-----------( 158      ( 09 Oct 2023 05:42 )             37.6     10-09    147<H>  |  110<H>  |  16.9  ----------------------------<  95  3.3<L>   |  30.0<H>  |  1.38<H>    Ca    8.3<L>      09 Oct 2023 05:42  Phos  2.6     10-08  Mg     2.3     10-09    TPro  6.6  /  Alb  2.6<L>  /  TBili  0.4  /  DBili  x   /  AST  20  /  ALT  <5  /  AlkPhos  116  10-09    RECENT CULTURES:  10-01 @ 12:00 .Abscess arm abscess     No growth at 5 days  Upon re-evaluation of gram stain:  No organisms seen per oil power field  Moderate polymorphonuclear leukocytes seen per low power field  Previously reported as:  Rare Gram positive cocci in pairs seen per oil power field  Rare Gram Negative Rods seen per oil power field    09-27 @ 14:47 .Blood Blood-Peripheral     No growth at 5 days    09-27 @ 14:42 .Blood Blood-Peripheral     No growth at 5 days      WBC Count: 11.85 K/uL (10-09-23 @ 05:42)  WBC Count: 10.12 K/uL (10-08-23 @ 04:10)  WBC Count: 8.12 K/uL (10-07-23 @ 07:48)  WBC Count: 6.26 K/uL (10-06-23 @ 06:45)  WBC Count: 7.03 K/uL (10-05-23 @ 06:15)    Creatinine: 1.38 mg/dL (10-09-23 @ 05:42)  Creatinine: 1.50 mg/dL (10-08-23 @ 04:10)  Creatinine: 1.58 mg/dL (10-07-23 @ 07:48)  Creatinine: 1.71 mg/dL (10-06-23 @ 06:45)  Creatinine: 1.78 mg/dL (10-05-23 @ 06:15)    SARS-CoV-2: NotDetec (09-25-23 @ 06:00)      < from: Xray Chest 1 View-PORTABLE IMMEDIATE (Xray Chest 1 View-PORTABLE IMMEDIATE .) (10.06.23 @ 06:21) >  ACC: [id number] EXAM:  XR CHEST PORTABLE IMMED 1V   ORDERED BY: MARIA C VANESSA     ACC: [id number] EXAM:  XR CHEST PORTABLE IMMED 1V   ORDERED BY: MARIA C VANESSA     PROCEDURE DATE:  10/06/2023      INTERPRETATION:  AP chest on October 6, 2023 at 4:34 AM. Concern is NG   tube.    Heart magnified by technique. Sternotomy 2 prostatic heart valves again   noted. Lungs are clear.    NG tube again noted. Tip is in the antrum.    Follow-up AP chest on October 6, 2020 23 x 17 a.m. Patient has NG tube   placement.    Heart magnified by technique.    Sternotomy and valve replacements noted.    Lungs are clear.    NG tube is again noted. On study of 4:34 AM was in the stomach but the NG   tube tip is barely in the stomach at this time.    IMPRESSION: NG tubetip is barely in the stomach at this time.    --- End of Report ---    < end of copied text >      < from: MUSTAPHA Echo Doppler (09.27.23 @ 10:59) >  Summary:   1. Left ventricular ejection fraction, by visual estimation, is 55 to 60%.   2. Severely enlarged left atrium.   3. Mild mitral valve regurgitation.   4. Mitral prosthesis vegetation.   5. There is a bioprosthetic valve in the mitral position with thickened   leaflets yet leaflet mobility is fairly preserved. There is trace to mild   valvular regurgitation with no perivalvular leaks. No rocking motion or   dehiscence noted. There is a large and mobile vegetation measuring   1.8x1.0 cm attached to the atrial surface of the medial bioprosthetic   leaflet. There is evidence of at least mild valve stenosis with a mean   transvalvular gradient of 5 mmHg at a heart rate of 60 bpm. The mitral   valve area by 3D is 1.57 cm2.   6. Mild tricuspid regurgitation.   7. Bioprosthesis in the aortic position.   8. Dilatation of the aortic root.    < end of copied text >        < from: TTE Echo Complete w/o Contrast w/ Doppler (09.26.23 @ 14:32) >  Summary:   1. Left ventricular ejection fraction, by visual estimation, is 50 to   55%.   2. Normal global left ventricular systolic function.   3. Diastolic function indeterminate.   4. Abnormal septal motion consistent with post-operative status.   5. Normal RV size with mildly reduced RV systolic function, inadequate   estimation of RVSP.   6. Bioprosthetic mitral valve present with a large mobile lesion (at   least 2.0 cm x 1.5 cm) prolapsing into and out of the LV-LA cavity,   hightly suggestive of vegetation. Mean transmitral valve gradient 4.8   mmHg (at HR of 60 bpm).   7. Bioprosthesis in the aortic position, mean gradient of 9 mmHg.   8. There is no evidence of pericardial effusion.   9. Compared to the prior TTE study from 5/23/23, lesion over bioMVR is   again noted and is now much larger and recommend MUSTAPHA study to confirm   vegetation and exclude other valvular involvement.  < end of copied text >

## 2023-10-09 NOTE — PROGRESS NOTE ADULT - ASSESSMENT
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  Labs consistent w/ SANTOS on CKD.  Hospital course complicated by bacteremia w/ cxs growing staph epidermidus and TTE showing large MV endocarditis. Vascular consulted with concern of Infected prosthetic vascular graft,  Patient is now s/p removal of L AV graft. Was monitor in SICU and then transfer to floor. Vascular requesting transfer back to medicine service. Patient hospital course now complicated by Ileus vs SBO. General surgery was consulted. NGT was placed and now removed.     Sepsis POA 2/2 Staphylococcus epidermidis bacteremia likely endovascular in origin w/ subsequent MV IE   Afebrile, WBC normalized   - Repeat BCXs from 09/27 NGTD  - LUE AV graft concerning for infectious source of bacteremia leading to IE now s/p removal on 10/1 by vascular  - s/p MUSTAPHA w/ evidence of large MV vegetation   - c/w cefazolin as per ID recs   - Due to renal failure will hold off on Gentamicin  - Due to Eliquis unable to use Rifampin   - Plan for Repeat MUSTAPHA on or after 10/24/23 per ID  - CT A/P re demonstrates aortic dissection stable from 05/2023 but no acute findings reported   - CTSx consulted for consideration of re-operation of MVR   - As per CTSx will hold off on surgery for now and on contrast studies given pt high surgical risk and risk of progressing to HD again   - If cxs remain negative can tx w/ 4wks of abxs repeat MUSTAPHA to see if vegetation improving as per CTSx  - Monitor CBC and temperature    Ileus vs SBO   Abdomen is more soft and passing gas   NG tube removed   Diet advanced to liquid diet   Out of bed to chair and ambulation recommended         SANTOS on CKD III likely prerenal azotemia 2/2 GI losses and poor po intake  - Cr improving since admission (baseline 1.5-1.8)  - Required short term HD in recent past   - Continue to hold of on diuretic given hypovolemia   - Avoid nephrotoxic meds or renally dose if needed   - Nephrology following and recs noted     Chronic HFpEF  - c/w Coreg  - Torsemide held in light of dehydration on admission resume on d/c or sooner if needed  - MUSTAPHA w/ LVEF 55-60%  - Monitor daily weights and I/O's  - Cardio following and recs noted    Paroxysmal Atrial fibrillation   - Rate controlled currently   - CHADSVASC of 5 and as per cardiology no need to bridge   - AC currently on hold due to recent procedure and now in light of possible SBO will hold in case needs surgical intervention   - will need to resume AC once SBO resolves    HTN - BP not controlled   Was not able to take oral medications   c/w  Labetalol 10 mg IV q8  Amlodipine and Carvedilol on hold   Will resume oral medications if tolerates liquid diet       Hx of SDH   - CTH has shown complete resolution since July 2023    Mitral/Aortic valve replacement   - Recent MUSTAPHA shows bioprosthetic valves with only moderate paravalvular leak in MV  - Now w/ large vegetation on MV  - CTSx following but no plan for surgical  intervention at this time      VTE ppx: HSQ  Dispo: remains acute

## 2023-10-09 NOTE — PROGRESS NOTE ADULT - SUBJECTIVE AND OBJECTIVE BOX
Patient seen and examined    I&O's Summary    08 Oct 2023 07:01  -  09 Oct 2023 07:00  --------------------------------------------------------  IN: 600 mL / OUT: 0 mL / NET: 600 mL    Much better  NGT out  Starting to eat slowly  Had a BM    REVIEW OF SYSTEMS:    CONSTITUTIONAL: No F/C  RESPIRATORY: No cough,  No SOB  CARDIOVASCULAR: No CP/palpitations,    GASTROINTESTINAL: No abdominal pain  or NVD now  GENITOURINARY: No UTI sx  NEUROLOGICAL: No headaches, NO wk/numbness  MUSCULOSKELETAL:   EXTREMITIES : no swelling,    Vital Signs Last 24 Hrs  T(C): 36.7 (09 Oct 2023 13:02), Max: 36.8 (09 Oct 2023 04:52)  T(F): 98 (09 Oct 2023 13:02), Max: 98.2 (09 Oct 2023 04:52)  HR: 76 (09 Oct 2023 13:02) (58 - 76)  BP: 138/69 (09 Oct 2023 13:02) (138/69 - 188/85)  BP(mean): --  RR: 18 (09 Oct 2023 13:02) (17 - 18)  SpO2: 96% (09 Oct 2023 13:02) (95% - 99%)    Parameters below as of 09 Oct 2023 13:02  Patient On (Oxygen Delivery Method): room air        PHYSICAL EXAM:    GENERAL: NAD,   EYES:  conjunctiva and sclera clear  NECK: Supple, No JVD/Bruit  NERVOUS SYSTEM:  A/O x3,   CHEST:  No rales or rhonchi  HEART:  RRR, No murmur  ABDOMEN: Soft, mildly tender and distended, good BS+  EXTREMITIES:  No Edema;  SKIN: No rashes    LABS:                          11.2   11.85 )-----------( 158      ( 09 Oct 2023 05:42 )             37.6     10-09    147<H>  |  110<H>  |  16.9  ----------------------------<  95  3.3<L>   |  30.0<H>  |  1.38<H>    Ca    8.3<L>      09 Oct 2023 05:42  Phos  2.6     10-08  Mg     2.3     10-09    TPro  6.6  /  Alb  2.6<L>  /  TBili  0.4  /  DBili  x   /  AST  20  /  ALT  <5  /  AlkPhos  116  10-09      MEDICATIONS  (STANDING):  acetaminophen     Tablet .. PRN  aluminum hydroxide/magnesium hydroxide/simethicone Suspension PRN  ceFAZolin  Injectable.  cloNIDine Patch 0.1 mG/24Hr(s)  dextrose 5% + sodium chloride 0.3%  heparin   Injectable  HYDROmorphone  Injectable PRN  HYDROmorphone  Injectable PRN  HYDROmorphone  Injectable PRN  labetalol Injectable  levETIRAcetam  IVPB  melatonin PRN  ondansetron Injectable PRN  polyethylene glycol 3350  potassium chloride   Powder  senna  tamsulosin

## 2023-10-09 NOTE — PROGRESS NOTE ADULT - SUBJECTIVE AND OBJECTIVE BOX
Patient is a 63y old male POD 8 from removal of infected L AV graft. Patient seen on beside rounds this AM. No acute events overnight. Patient states he has been passing gas and pain is well controlled. Wound appears to be healing well with no signs of infection. Dressing was changed and the arm was wrapped with ACE.     ceFAZolin  Injectable. 2000  ceFAZolin  Injectable. 2000  cloNIDine Patch 0.1 mG/24Hr(s) 1  heparin   Injectable 5000  labetalol Injectable 10    Allergies    penicillin (Other)    Intolerances      Vital Signs Last 24 Hrs  T(C): 36.8 (09 Oct 2023 04:52), Max: 36.8 (09 Oct 2023 04:52)  T(F): 98.2 (09 Oct 2023 04:52), Max: 98.2 (09 Oct 2023 04:52)  HR: 68 (09 Oct 2023 04:52) (56 - 80)  BP: 178/68 (09 Oct 2023 04:52) (164/81 - 188/85)  BP(mean): --  RR: 18 (09 Oct 2023 04:52) (17 - 18)  SpO2: 97% (09 Oct 2023 04:52) (95% - 99%)    Parameters below as of 09 Oct 2023 04:52  Patient On (Oxygen Delivery Method): room air      I&O's Detail    07 Oct 2023 07:01  -  08 Oct 2023 07:00  --------------------------------------------------------  IN:    dextrose 5% + sodium chloride 0.45% w/ Additives: 825 mL  Total IN: 825 mL    OUT:    Nasogastric/Oral tube (mL): 1500 mL  Total OUT: 1500 mL    Total NET: -675 mL      08 Oct 2023 07:01  -  09 Oct 2023 06:48  --------------------------------------------------------  IN:    dextrose 5% + sodium chloride 0.45%: 600 mL  Total IN: 600 mL    OUT:  Total OUT: 0 mL    Total NET: 600 mL    Physical Exam:  General: NAD, resting comfortably in bed  Pulmonary: Nonlabored breathing, no respiratory distress  Cardiovascular: NSR  Abdominal: soft, NT/ no distention  Pulses: palpable ulnar pulses.    LABS:                        11.1   10.12 )-----------( 187      ( 08 Oct 2023 04:10 )             37.3     10-08    150<H>  |  107  |  21.6<H>  ----------------------------<  107<H>  3.2<L>   |  33.0<H>  |  1.50<H>    Ca    8.3<L>      08 Oct 2023 04:10  Phos  2.6     10-08  Mg     2.4     10-08        Problem list:  CHF (congestive heart failure)    CVA (cerebral vascular accident)    Chronic kidney disease    Seizures    HTN (hypertension)    Hyperlipemia    COVID-19    Atrial fibrillation    ESRD on dialysis    Former smoker    History of cocaine use    H/O aortic dissection    H/O aortic valve insufficiency    Mitral regurgitation    GIB (gastrointestinal bleeding)    CAD (coronary artery disease)    Chronic atrial fibrillation    Aorta disorder    H/O colectomy    Status post double vessel coronary artery bypass    S/P AVR (aortic valve replacement)    S/P MVR (mitral valve replacement)    H/O aortic dissection    AV fistula    Removal, AV graft, infected

## 2023-10-09 NOTE — PROGRESS NOTE ADULT - NS ATTEND OPT1 GEN_ALL_CORE

## 2023-10-09 NOTE — PROGRESS NOTE ADULT - ASSESSMENT
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  Labs consistent w/ SANTOS on CKD.  Hospital course complicated by bacteremia w/ cxs growing staph epidermidus and TTE showing large MV endocarditis. Vascular consulted with concern of Infected prosthetic vascular graft,  Patient is now s/p removal of L AV graft. Was monitor in SICU and then transfer to floor. Vascular requesting transfer back to medicine service. Patient hospital course now complicated by Ileus vs SBO. General surgery was consulted. NGT was placed. Now large drainage noted again today ; Was much less yesterday  Sepsis POA 2/2 Staphylococcus epidermidis bacteremia likely endovascular in origin w/ subsequent MV IE   - Repeat BCXs from 09/27 NGTD  - LUE AV graft concerning for infectious source of bacteremia leading to IE now s/p removal on 10/1 by vascular  - Has MV/AV replacements which increases Pts risk of IE in the setting of staph epi bacteremia    - s/p MUSTAPHA w/ evidence of large MV vegetation - on Ancef per ID    CKD - Creatinine stable 1.7>1.5>1.38 - near baseline - had SANTOS    Hypernatremia /hypokalemia - adjust IVfs- improving  Supplement Kcl     HFpEF, Afib    Htn - add clonidine patch 0.1    SDH - full resolution likely

## 2023-10-09 NOTE — PROGRESS NOTE ADULT - SUBJECTIVE AND OBJECTIVE BOX
Patient is a 63y old  Male who presents with a chief complaint of Intractable N/V, SANTOS (07 Oct 2023 05:47)      INTERVAL HPI/OVERNIGHT EVENTS: seen and examined. No complains. Pulled NG tube. Had one large bowel movement and passing gas     MEDICATIONS  (STANDING):  amLODIPine   Tablet 10 milliGRAM(s) Oral daily  atorvastatin 40 milliGRAM(s) Oral at bedtime  ceFAZolin  Injectable. 2000 milliGRAM(s) IV Push every 8 hours  dextrose 5% + sodium chloride 0.45% with potassium chloride 10 mEq/L 1000 milliLiter(s) (75 mL/Hr) IV Continuous <Continuous>  heparin   Injectable 5000 Unit(s) SubCutaneous every 8 hours  levETIRAcetam  IVPB 1000 milliGRAM(s) IV Intermittent every 12 hours  polyethylene glycol 3350 17 Gram(s) Oral daily  senna 2 Tablet(s) Oral at bedtime  tamsulosin 0.4 milliGRAM(s) Oral at bedtime    MEDICATIONS  (PRN):  acetaminophen     Tablet .. 650 milliGRAM(s) Oral every 6 hours PRN Temp greater or equal to 38C (100.4F), Mild Pain (1 - 3)  aluminum hydroxide/magnesium hydroxide/simethicone Suspension 30 milliLiter(s) Oral every 4 hours PRN Dyspepsia  hydrALAZINE Injectable 10 milliGRAM(s) IV Push every 6 hours PRN SBP>170  HYDROmorphone  Injectable 0.5 milliGRAM(s) IV Push every 6 hours PRN Moderate Pain (4 - 6)  HYDROmorphone  Injectable 1 milliGRAM(s) IV Push every 4 hours PRN breakthru pain  or prior o dressing changes  HYDROmorphone  Injectable 1 milliGRAM(s) IV Push every 6 hours PRN Severe Pain (7 - 10)  melatonin 3 milliGRAM(s) Oral at bedtime PRN Insomnia  ondansetron Injectable 4 milliGRAM(s) IV Push every 8 hours PRN Nausea and/or Vomiting      Allergies    penicillin (Other)    Intolerances        REVIEW OF SYSTEMS:  CONSTITUTIONAL: No fever, weight loss, or fatigue  RESPIRATORY: No cough, wheezing, chills or hemoptysis; No shortness of breath  CARDIOVASCULAR: No chest pain, palpitations, dizziness, or leg swelling  GASTROINTESTINAL: No abdominal or epigastric pain. No nausea, vomiting, or hematemesis; No diarrhea or constipation. No melena or hematochezia.  NEUROLOGICAL: No headaches, memory loss, loss of strength, numbness, or tremors  MUSCULOSKELETAL: No joint pain or swelling; No muscle, back, or extremity pain      Vital Signs Last 24 Hrs  T(C): 36.7 (07 Oct 2023 08:22), Max: 36.8 (07 Oct 2023 00:00)  T(F): 98 (07 Oct 2023 08:22), Max: 98.2 (07 Oct 2023 00:00)  HR: 62 (07 Oct 2023 08:22) (60 - 75)  BP: 180/82 (07 Oct 2023 08:22) (147/85 - 184/76)  BP(mean): --  RR: 18 (07 Oct 2023 08:22) (18 - 18)  SpO2: 95% (07 Oct 2023 08:22) (92% - 97%)    Parameters below as of 07 Oct 2023 08:22  Patient On (Oxygen Delivery Method): room air        PHYSICAL EXAM:  GENERAL: NAD, laying in bed   HEAD:  Atraumatic, Normocephalic  EYES: EOMI, PERRLA, conjunctiva and sclera clear  NECK: Supple, No JVD,  NERVOUS SYSTEM:  Alert & Oriented X3, No gross focal deficits  CHEST/LUNG: Clear to percussion bilaterally; No rales, rhonchi, wheezing, or rubs  HEART: Regular rate and rhythm; No murmurs, rubs, or gallops  ABDOMEN: Soft, Nontender, Nondistended; Bowel sounds present, large midabdominal scar   EXTREMITIES:  No clubbing, cyanosis, or edema  SKIN: No rashes or lesions    LABS:                        10.2   8.12  )-----------( 163      ( 07 Oct 2023 07:48 )             34.2     10-07    148<H>  |  110<H>  |  29.1<H>  ----------------------------<  113<H>  3.6   |  27.0  |  1.58<H>    Ca    8.4      07 Oct 2023 07:48  Phos  2.4     10-07  Mg     2.5     10-07    TPro  6.9  /  Alb  2.6<L>  /  TBili  0.6  /  DBili  x   /  AST  22  /  ALT  <5  /  AlkPhos  111  10-06      Urinalysis Basic - ( 07 Oct 2023 07:48 )    Color: x / Appearance: x / SG: x / pH: x  Gluc: 113 mg/dL / Ketone: x  / Bili: x / Urobili: x   Blood: x / Protein: x / Nitrite: x   Leuk Esterase: x / RBC: x / WBC x   Sq Epi: x / Non Sq Epi: x / Bacteria: x      CAPILLARY BLOOD GLUCOSE          RADIOLOGY & ADDITIONAL TESTS:    Imaging Personally Reviewed:  [ x] YES  [ ] NO    Consultant(s) Notes Reviewed:  [x ] YES  [ ] NO    Care Discussed with Consultants/Other Providers [ ] YES  [ ] NO    Plan of Care discussed with Housestaff [ x]YES [ ] NO

## 2023-10-10 LAB
ANION GAP SERPL CALC-SCNC: 8 MMOL/L — SIGNIFICANT CHANGE UP (ref 5–17)
BASOPHILS # BLD AUTO: 0.03 K/UL — SIGNIFICANT CHANGE UP (ref 0–0.2)
BASOPHILS NFR BLD AUTO: 0.3 % — SIGNIFICANT CHANGE UP (ref 0–2)
BUN SERPL-MCNC: 14 MG/DL — SIGNIFICANT CHANGE UP (ref 8–20)
CALCIUM SERPL-MCNC: 7.4 MG/DL — LOW (ref 8.4–10.5)
CHLORIDE SERPL-SCNC: 106 MMOL/L — SIGNIFICANT CHANGE UP (ref 96–108)
CO2 SERPL-SCNC: 25 MMOL/L — SIGNIFICANT CHANGE UP (ref 22–29)
CREAT SERPL-MCNC: 1.28 MG/DL — SIGNIFICANT CHANGE UP (ref 0.5–1.3)
EGFR: 63 ML/MIN/1.73M2 — SIGNIFICANT CHANGE UP
EOSINOPHIL # BLD AUTO: 0.4 K/UL — SIGNIFICANT CHANGE UP (ref 0–0.5)
EOSINOPHIL NFR BLD AUTO: 4 % — SIGNIFICANT CHANGE UP (ref 0–6)
GLUCOSE SERPL-MCNC: 128 MG/DL — HIGH (ref 70–99)
HCT VFR BLD CALC: 31.8 % — LOW (ref 39–50)
HGB BLD-MCNC: 9.6 G/DL — LOW (ref 13–17)
IMM GRANULOCYTES NFR BLD AUTO: 0.7 % — SIGNIFICANT CHANGE UP (ref 0–0.9)
LYMPHOCYTES # BLD AUTO: 1.26 K/UL — SIGNIFICANT CHANGE UP (ref 1–3.3)
LYMPHOCYTES # BLD AUTO: 12.6 % — LOW (ref 13–44)
MAGNESIUM SERPL-MCNC: 1.8 MG/DL — SIGNIFICANT CHANGE UP (ref 1.8–2.6)
MCHC RBC-ENTMCNC: 27.6 PG — SIGNIFICANT CHANGE UP (ref 27–34)
MCHC RBC-ENTMCNC: 30.2 GM/DL — LOW (ref 32–36)
MCV RBC AUTO: 91.4 FL — SIGNIFICANT CHANGE UP (ref 80–100)
MONOCYTES # BLD AUTO: 0.81 K/UL — SIGNIFICANT CHANGE UP (ref 0–0.9)
MONOCYTES NFR BLD AUTO: 8.1 % — SIGNIFICANT CHANGE UP (ref 2–14)
NEUTROPHILS # BLD AUTO: 7.4 K/UL — SIGNIFICANT CHANGE UP (ref 1.8–7.4)
NEUTROPHILS NFR BLD AUTO: 74.3 % — SIGNIFICANT CHANGE UP (ref 43–77)
PLATELET # BLD AUTO: 120 K/UL — LOW (ref 150–400)
POTASSIUM SERPL-MCNC: 3.3 MMOL/L — LOW (ref 3.5–5.3)
POTASSIUM SERPL-SCNC: 3.3 MMOL/L — LOW (ref 3.5–5.3)
RBC # BLD: 3.48 M/UL — LOW (ref 4.2–5.8)
RBC # FLD: 14.3 % — SIGNIFICANT CHANGE UP (ref 10.3–14.5)
SODIUM SERPL-SCNC: 139 MMOL/L — SIGNIFICANT CHANGE UP (ref 135–145)
WBC # BLD: 9.97 K/UL — SIGNIFICANT CHANGE UP (ref 3.8–10.5)
WBC # FLD AUTO: 9.97 K/UL — SIGNIFICANT CHANGE UP (ref 3.8–10.5)

## 2023-10-10 PROCEDURE — 99233 SBSQ HOSP IP/OBS HIGH 50: CPT

## 2023-10-10 PROCEDURE — 99232 SBSQ HOSP IP/OBS MODERATE 35: CPT

## 2023-10-10 PROCEDURE — 99231 SBSQ HOSP IP/OBS SF/LOW 25: CPT | Mod: FS

## 2023-10-10 RX ORDER — APIXABAN 2.5 MG/1
5 TABLET, FILM COATED ORAL EVERY 12 HOURS
Refills: 0 | Status: DISCONTINUED | OUTPATIENT
Start: 2023-10-10 | End: 2023-10-16

## 2023-10-10 RX ORDER — POTASSIUM CHLORIDE 20 MEQ
40 PACKET (EA) ORAL ONCE
Refills: 0 | Status: COMPLETED | OUTPATIENT
Start: 2023-10-10 | End: 2023-10-10

## 2023-10-10 RX ORDER — HYDROMORPHONE HYDROCHLORIDE 2 MG/ML
1 INJECTION INTRAMUSCULAR; INTRAVENOUS; SUBCUTANEOUS EVERY 4 HOURS
Refills: 0 | Status: DISCONTINUED | OUTPATIENT
Start: 2023-10-10 | End: 2023-10-10

## 2023-10-10 RX ORDER — CARVEDILOL PHOSPHATE 80 MG/1
6.25 CAPSULE, EXTENDED RELEASE ORAL EVERY 12 HOURS
Refills: 0 | Status: DISCONTINUED | OUTPATIENT
Start: 2023-10-10 | End: 2023-10-16

## 2023-10-10 RX ORDER — HYDROMORPHONE HYDROCHLORIDE 2 MG/ML
0.5 INJECTION INTRAMUSCULAR; INTRAVENOUS; SUBCUTANEOUS ONCE
Refills: 0 | Status: DISCONTINUED | OUTPATIENT
Start: 2023-10-10 | End: 2023-10-10

## 2023-10-10 RX ORDER — AMLODIPINE BESYLATE 2.5 MG/1
10 TABLET ORAL DAILY
Refills: 0 | Status: DISCONTINUED | OUTPATIENT
Start: 2023-10-10 | End: 2023-10-16

## 2023-10-10 RX ADMIN — LEVETIRACETAM 400 MILLIGRAM(S): 250 TABLET, FILM COATED ORAL at 05:03

## 2023-10-10 RX ADMIN — Medication 2000 MILLIGRAM(S): at 11:06

## 2023-10-10 RX ADMIN — CARVEDILOL PHOSPHATE 6.25 MILLIGRAM(S): 80 CAPSULE, EXTENDED RELEASE ORAL at 17:11

## 2023-10-10 RX ADMIN — Medication 650 MILLIGRAM(S): at 15:10

## 2023-10-10 RX ADMIN — SENNA PLUS 2 TABLET(S): 8.6 TABLET ORAL at 20:59

## 2023-10-10 RX ADMIN — Medication 650 MILLIGRAM(S): at 20:59

## 2023-10-10 RX ADMIN — Medication 40 MILLIEQUIVALENT(S): at 17:10

## 2023-10-10 RX ADMIN — HYDROMORPHONE HYDROCHLORIDE 1 MILLIGRAM(S): 2 INJECTION INTRAMUSCULAR; INTRAVENOUS; SUBCUTANEOUS at 04:59

## 2023-10-10 RX ADMIN — Medication 650 MILLIGRAM(S): at 21:59

## 2023-10-10 RX ADMIN — Medication 10 MILLIGRAM(S): at 05:03

## 2023-10-10 RX ADMIN — APIXABAN 5 MILLIGRAM(S): 2.5 TABLET, FILM COATED ORAL at 17:10

## 2023-10-10 RX ADMIN — HYDROMORPHONE HYDROCHLORIDE 1 MILLIGRAM(S): 2 INJECTION INTRAMUSCULAR; INTRAVENOUS; SUBCUTANEOUS at 13:12

## 2023-10-10 RX ADMIN — LEVETIRACETAM 400 MILLIGRAM(S): 250 TABLET, FILM COATED ORAL at 17:19

## 2023-10-10 RX ADMIN — HYDROMORPHONE HYDROCHLORIDE 0.5 MILLIGRAM(S): 2 INJECTION INTRAMUSCULAR; INTRAVENOUS; SUBCUTANEOUS at 15:10

## 2023-10-10 RX ADMIN — HYDROMORPHONE HYDROCHLORIDE 0.5 MILLIGRAM(S): 2 INJECTION INTRAMUSCULAR; INTRAVENOUS; SUBCUTANEOUS at 22:36

## 2023-10-10 RX ADMIN — HYDROMORPHONE HYDROCHLORIDE 1 MILLIGRAM(S): 2 INJECTION INTRAMUSCULAR; INTRAVENOUS; SUBCUTANEOUS at 12:27

## 2023-10-10 RX ADMIN — TAMSULOSIN HYDROCHLORIDE 0.4 MILLIGRAM(S): 0.4 CAPSULE ORAL at 21:00

## 2023-10-10 RX ADMIN — HYDROMORPHONE HYDROCHLORIDE 1 MILLIGRAM(S): 2 INJECTION INTRAMUSCULAR; INTRAVENOUS; SUBCUTANEOUS at 17:20

## 2023-10-10 RX ADMIN — Medication 650 MILLIGRAM(S): at 14:33

## 2023-10-10 RX ADMIN — HEPARIN SODIUM 5000 UNIT(S): 5000 INJECTION INTRAVENOUS; SUBCUTANEOUS at 05:02

## 2023-10-10 RX ADMIN — HYDROMORPHONE HYDROCHLORIDE 1 MILLIGRAM(S): 2 INJECTION INTRAMUSCULAR; INTRAVENOUS; SUBCUTANEOUS at 01:01

## 2023-10-10 RX ADMIN — AMLODIPINE BESYLATE 10 MILLIGRAM(S): 2.5 TABLET ORAL at 13:20

## 2023-10-10 RX ADMIN — Medication 1 PATCH: at 19:00

## 2023-10-10 RX ADMIN — Medication 2000 MILLIGRAM(S): at 04:58

## 2023-10-10 RX ADMIN — HYDROMORPHONE HYDROCHLORIDE 0.5 MILLIGRAM(S): 2 INJECTION INTRAMUSCULAR; INTRAVENOUS; SUBCUTANEOUS at 23:36

## 2023-10-10 RX ADMIN — SODIUM CHLORIDE 100 MILLILITER(S): 9 INJECTION, SOLUTION INTRAVENOUS at 11:08

## 2023-10-10 RX ADMIN — HYDROMORPHONE HYDROCHLORIDE 1 MILLIGRAM(S): 2 INJECTION INTRAMUSCULAR; INTRAVENOUS; SUBCUTANEOUS at 17:39

## 2023-10-10 RX ADMIN — HYDROMORPHONE HYDROCHLORIDE 0.5 MILLIGRAM(S): 2 INJECTION INTRAMUSCULAR; INTRAVENOUS; SUBCUTANEOUS at 14:33

## 2023-10-10 NOTE — PROGRESS NOTE ADULT - NS PANP COMMENT GEN_ALL_CORE FT
63-year-old male with extensive surgical history including small bowel resection admitted with AVF infection found to have ileus vs SBO   - Tolerated clears well. Having bowel function.   - Advanced to regular diet today by primary team and tolerating well.   - OOB/ambulation   - Monitor abdominal exam and bowel function.  - Further management per primary team 63-year-old male with extensive surgical history including small bowel resection admitted with AVF infection found to have ileus vs SBO   - Tolerated clears well. Having bowel function.   - Advanced to regular diet today by primary team and tolerating well.   - OOB/ambulation   - Monitor abdominal exam and bowel function.  - Normalize electrolytes   - Further management per primary team

## 2023-10-10 NOTE — PROGRESS NOTE ADULT - ASSESSMENT
Improving  SANTOS on CKD stage III serum cr overall improving- back to baseline   - Baseline 1.6 ---> h/o temporary HD ending in Nov 2021 --> was on HD x 1 year   No hydronephrosis on CT 9/23     Sepsis with Staph epidermidis bacteremia and prosthetic MV endocarditis  Thrombosed L UE AVG likely source  Went to OR w Vascular 10/1--> Excision of L graft  ID eval appreciated, PCN allery--> on Cefazolin    MA - likely 2/2 CKD - resolved    HypoKalemia --> supplemented    Renally dose meds  Avoid nephrotoxic agents  AM labs will follow

## 2023-10-10 NOTE — PROGRESS NOTE ADULT - SUBJECTIVE AND OBJECTIVE BOX
NEPHROLOGY INTERVAL HPI/OVERNIGHT EVENTS:    Examined  Feeling better  Denies HA CP no SOB    MEDICATIONS  (STANDING):  amLODIPine   Tablet 10 milliGRAM(s) Oral daily  apixaban 5 milliGRAM(s) Oral every 12 hours  carvedilol 6.25 milliGRAM(s) Oral every 12 hours  ceFAZolin  Injectable. 2000 milliGRAM(s) IV Push every 8 hours  cloNIDine Patch 0.1 mG/24Hr(s) 1 patch Transdermal every 7 days  levETIRAcetam  IVPB 1000 milliGRAM(s) IV Intermittent every 12 hours  polyethylene glycol 3350 17 Gram(s) Oral daily  senna 2 Tablet(s) Oral at bedtime  tamsulosin 0.4 milliGRAM(s) Oral at bedtime    MEDICATIONS  (PRN):  acetaminophen     Tablet .. 650 milliGRAM(s) Oral every 6 hours PRN Temp greater or equal to 38C (100.4F), Mild Pain (1 - 3)  aluminum hydroxide/magnesium hydroxide/simethicone Suspension 30 milliLiter(s) Oral every 4 hours PRN Dyspepsia  HYDROmorphone  Injectable 0.5 milliGRAM(s) IV Push every 6 hours PRN Moderate Pain (4 - 6)  HYDROmorphone  Injectable 1 milliGRAM(s) IV Push every 4 hours PRN breakthrough pain  HYDROmorphone  Injectable 1 milliGRAM(s) IV Push every 6 hours PRN Severe Pain (7 - 10)  melatonin 3 milliGRAM(s) Oral at bedtime PRN Insomnia  ondansetron Injectable 4 milliGRAM(s) IV Push every 8 hours PRN Nausea and/or Vomiting      Allergies    penicillin (Other)    Intolerances        Vital Signs Last 24 Hrs  T(C): 36.7 (10 Oct 2023 11:37), Max: 36.9 (09 Oct 2023 20:46)  T(F): 98 (10 Oct 2023 11:37), Max: 98.4 (09 Oct 2023 20:46)  HR: 73 (10 Oct 2023 13:17) (67 - 80)  BP: 105/57 (10 Oct 2023 13:17) (105/57 - 150/79)  BP(mean): --  RR: 18 (10 Oct 2023 11:37) (18 - 18)  SpO2: 97% (10 Oct 2023 11:37) (95% - 98%)    Parameters below as of 10 Oct 2023 11:37  Patient On (Oxygen Delivery Method): room air      Daily     Daily     PHYSICAL EXAM:  GENERAL: NAD  EYES: EOMI  NECK: Supple  NERVOUS SYSTEM:  A/O x3  CHEST:  No rales   HEART:  RRR, No murmur  ABDOMEN: Soft, NT/ND BS+  EXTREMITIES:  No Edema    LABS:                        9.6    9.97  )-----------( 120      ( 10 Oct 2023 07:50 )             31.8     10-10    139  |  106  |  14.0  ----------------------------<  128<H>  3.3<L>   |  25.0  |  1.28    Ca    7.4<L>      10 Oct 2023 07:50  Mg     1.8     10-10    TPro  6.6  /  Alb  2.6<L>  /  TBili  0.4  /  DBili  x   /  AST  20  /  ALT  <5  /  AlkPhos  116  10-09      Urinalysis Basic - ( 10 Oct 2023 07:50 )    Color: x / Appearance: x / SG: x / pH: x  Gluc: 128 mg/dL / Ketone: x  / Bili: x / Urobili: x   Blood: x / Protein: x / Nitrite: x   Leuk Esterase: x / RBC: x / WBC x   Sq Epi: x / Non Sq Epi: x / Bacteria: x      Magnesium: 1.8 mg/dL (10-10 @ 07:50)          RADIOLOGY & ADDITIONAL TESTS:

## 2023-10-10 NOTE — PROGRESS NOTE ADULT - NS ATTEND BILL GEN_ALL_CORE
PA/NP to bill
Attending to bill

## 2023-10-10 NOTE — PROGRESS NOTE ADULT - SUBJECTIVE AND OBJECTIVE BOX
Patient is a 63y old  Male who presents with a chief complaint of Intractable N/V, SANTOS (07 Oct 2023 05:47)      INTERVAL HPI/OVERNIGHT EVENTS: seen and examined. No complains. Tolerating liquid diet.      MEDICATIONS  (STANDING):  amLODIPine   Tablet 10 milliGRAM(s) Oral daily  atorvastatin 40 milliGRAM(s) Oral at bedtime  ceFAZolin  Injectable. 2000 milliGRAM(s) IV Push every 8 hours  dextrose 5% + sodium chloride 0.45% with potassium chloride 10 mEq/L 1000 milliLiter(s) (75 mL/Hr) IV Continuous <Continuous>  heparin   Injectable 5000 Unit(s) SubCutaneous every 8 hours  levETIRAcetam  IVPB 1000 milliGRAM(s) IV Intermittent every 12 hours  polyethylene glycol 3350 17 Gram(s) Oral daily  senna 2 Tablet(s) Oral at bedtime  tamsulosin 0.4 milliGRAM(s) Oral at bedtime    MEDICATIONS  (PRN):  acetaminophen     Tablet .. 650 milliGRAM(s) Oral every 6 hours PRN Temp greater or equal to 38C (100.4F), Mild Pain (1 - 3)  aluminum hydroxide/magnesium hydroxide/simethicone Suspension 30 milliLiter(s) Oral every 4 hours PRN Dyspepsia  hydrALAZINE Injectable 10 milliGRAM(s) IV Push every 6 hours PRN SBP>170  HYDROmorphone  Injectable 0.5 milliGRAM(s) IV Push every 6 hours PRN Moderate Pain (4 - 6)  HYDROmorphone  Injectable 1 milliGRAM(s) IV Push every 4 hours PRN breakthru pain  or prior o dressing changes  HYDROmorphone  Injectable 1 milliGRAM(s) IV Push every 6 hours PRN Severe Pain (7 - 10)  melatonin 3 milliGRAM(s) Oral at bedtime PRN Insomnia  ondansetron Injectable 4 milliGRAM(s) IV Push every 8 hours PRN Nausea and/or Vomiting      Allergies    penicillin (Other)    Intolerances        REVIEW OF SYSTEMS:  CONSTITUTIONAL: No fever, weight loss, or fatigue  RESPIRATORY: No cough, wheezing, chills or hemoptysis; No shortness of breath  CARDIOVASCULAR: No chest pain, palpitations, dizziness, or leg swelling  GASTROINTESTINAL: No abdominal or epigastric pain. No nausea, vomiting, or hematemesis; No diarrhea or constipation. No melena or hematochezia.  NEUROLOGICAL: No headaches, memory loss, loss of strength, numbness, or tremors  MUSCULOSKELETAL: No joint pain or swelling; No muscle, back, or extremity pain      Vital Signs Last 24 Hrs  T(C): 36.7 (07 Oct 2023 08:22), Max: 36.8 (07 Oct 2023 00:00)  T(F): 98 (07 Oct 2023 08:22), Max: 98.2 (07 Oct 2023 00:00)  HR: 62 (07 Oct 2023 08:22) (60 - 75)  BP: 180/82 (07 Oct 2023 08:22) (147/85 - 184/76)  BP(mean): --  RR: 18 (07 Oct 2023 08:22) (18 - 18)  SpO2: 95% (07 Oct 2023 08:22) (92% - 97%)    Parameters below as of 07 Oct 2023 08:22  Patient On (Oxygen Delivery Method): room air        PHYSICAL EXAM:  GENERAL: NAD, laying in bed   HEAD:  Atraumatic, Normocephalic  EYES: EOMI, PERRLA, conjunctiva and sclera clear  NECK: Supple, No JVD,  NERVOUS SYSTEM:  Alert & Oriented X3, No gross focal deficits  CHEST/LUNG: Clear to percussion bilaterally; No rales, rhonchi, wheezing, or rubs  HEART: Regular rate and rhythm; No murmurs, rubs, or gallops  ABDOMEN: Soft, Nontender, Nondistended; Bowel sounds present, large midabdominal scar   EXTREMITIES:  No clubbing, cyanosis, or edema  SKIN: No rashes or lesions    LABS:                        10.2   8.12  )-----------( 163      ( 07 Oct 2023 07:48 )             34.2     10-07    148<H>  |  110<H>  |  29.1<H>  ----------------------------<  113<H>  3.6   |  27.0  |  1.58<H>    Ca    8.4      07 Oct 2023 07:48  Phos  2.4     10-07  Mg     2.5     10-07    TPro  6.9  /  Alb  2.6<L>  /  TBili  0.6  /  DBili  x   /  AST  22  /  ALT  <5  /  AlkPhos  111  10-06      Urinalysis Basic - ( 07 Oct 2023 07:48 )    Color: x / Appearance: x / SG: x / pH: x  Gluc: 113 mg/dL / Ketone: x  / Bili: x / Urobili: x   Blood: x / Protein: x / Nitrite: x   Leuk Esterase: x / RBC: x / WBC x   Sq Epi: x / Non Sq Epi: x / Bacteria: x      CAPILLARY BLOOD GLUCOSE          RADIOLOGY & ADDITIONAL TESTS:    Imaging Personally Reviewed:  [ x] YES  [ ] NO    Consultant(s) Notes Reviewed:  [x ] YES  [ ] NO    Care Discussed with Consultants/Other Providers [ ] YES  [ ] NO    Plan of Care discussed with Housestaff [ x]YES [ ] NO

## 2023-10-10 NOTE — PROGRESS NOTE ADULT - ASSESSMENT
63M with multiple medical comorbidities with concerns for ileus vs small bowel obstruction on admission now resolved      - advance to regular diet  - Encourage ambulation, OOB to chair   - DVT ppx: HSQ  - Monitor flatus/BM  - pain control   - Surgery to sign off at this time, please recall as necessary

## 2023-10-10 NOTE — PROGRESS NOTE ADULT - ASSESSMENT
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  Labs consistent w/ SANTOS on CKD.  Hospital course complicated by bacteremia w/ cxs growing staph epidermidus and TTE showing large MV endocarditis. Vascular consulted with concern of Infected prosthetic vascular graft,  Patient is now s/p removal of L AV graft. Was monitor in SICU and then transfer to floor. Vascular requesting transfer back to medicine service. Patient hospital course now complicated by Ileus vs SBO. General surgery was consulted. NGT was placed and now removed.     Sepsis POA 2/2 Staphylococcus epidermidis bacteremia likely endovascular in origin w/ subsequent MV IE   Afebrile, WBC normalized   - Repeat BCXs from 09/27 NGTD  - LUE AV graft concerning for infectious source of bacteremia leading to IE now s/p removal on 10/1 by vascular  - s/p MUSTAPHA w/ evidence of large MV vegetation   - c/w cefazolin as per ID recs   - Due to renal failure will hold off on Gentamicin  - Due to Eliquis unable to use Rifampin   - Plan for Repeat MUSTAPHA on or after 10/24/23 per ID  - CT A/P re demonstrates aortic dissection stable from 05/2023 but no acute findings reported   - CTSx consulted for consideration of re-operation of MVR   - As per CTSx will hold off on surgery for now and on contrast studies given pt high surgical risk and risk of progressing to HD again   - If cxs remain negative can tx w/ 4wks of abxs repeat MUSTAPHA to see if vegetation improving as per CTSx  - Monitor CBC and temperature    Ileus vs SBO   Tolerating liquid diet   Will advance diet to full liquid   Out of bed to chair and ambulation recommended         SANTOS on CKD III likely prerenal azotemia 2/2 GI losses and poor po intake  - Cr improved  since admission (baseline 1.5-1.8),   - Continue to hold of on diuretic given hypovolemia   - Avoid nephrotoxic meds or renally dose if needed       Chronic HFpEF  - c/w Coreg  - Torsemide held in light of dehydration on admission resume on d/c or sooner if needed  - MUSTAPHA w/ LVEF 55-60%  - Monitor daily weights and I/O's  - Cardio following and recs noted    Paroxysmal Atrial fibrillation   - Rate controlled currently   - CHADSVASC of 5 and as per cardiology no need to bridge   Resumed Eliquis on 10/10      HTN - BP better controlled   will resume oral medications: amlodipine and Carvedilol   dc Labetalol IV         Hx of SDH   - CTH has shown complete resolution since July 2023    Mitral/Aortic valve replacement   - Recent MUSTAPHA shows bioprosthetic valves with only moderate paravalvular leak in MV  - Now w/ large vegetation on MV  - CTSx following but no plan for surgical  intervention at this time      VTE ppx: HSQ  Dispo: remains acute

## 2023-10-10 NOTE — CHART NOTE - NSCHARTNOTEFT_GEN_A_CORE
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  Labs consistent w/ SANTOS on CKD.  Hospital course complicated by bacteremia w/ cxs growing staph epidermidus and TTE showing large MV endocarditis. Vascular consulted with concern of Infected prosthetic vascular graft,  Patient is now s/p removal of L AV graft. Was monitor in SICU and then transfer to floor. Vascular requesting transfer back to medicine service. Patient hospital course now complicated by Ileus vs SBO. General surgery was consulted. NGT was placed and now removed.   Sepsis POA 2/2 Staphylococcus epidermidis bacteremia likely endovascular in origin w/ subsequent MV IE     Current Diet: Diet, Full Liquid (10-10-23 @ 11:50)      Patient reports [ ] nausea  [ ] vomiting [ ] diarrhea [ ] constipation  [ ]chewing problems [ ] swallowing issues  [ ] other:     PO intake:  < 50% [ x]   50-75%  [ ]   %  [ ]  other :    Source for PO intake [x ] Patient [ ] family [ ] chart [ ] staff [ ] other      Current Weight:   10/10 72.2 kg  10/4 69.8 kg  9/24 77 kg  9/23 77.3 kg  % Weight Change   Edema : 2+ L arm    Pertinent Medications: MEDICATIONS  (STANDING):  amLODIPine   Tablet 10 milliGRAM(s) Oral daily  apixaban 5 milliGRAM(s) Oral every 12 hours  carvedilol 6.25 milliGRAM(s) Oral every 12 hours  ceFAZolin  Injectable. 2000 milliGRAM(s) IV Push every 8 hours  cloNIDine Patch 0.1 mG/24Hr(s) 1 patch Transdermal every 7 days  levETIRAcetam  IVPB 1000 milliGRAM(s) IV Intermittent every 12 hours  polyethylene glycol 3350 17 Gram(s) Oral daily  potassium chloride    Tablet ER 40 milliEquivalent(s) Oral once  senna 2 Tablet(s) Oral at bedtime  tamsulosin 0.4 milliGRAM(s) Oral at bedtime    MEDICATIONS  (PRN):  acetaminophen     Tablet .. 650 milliGRAM(s) Oral every 6 hours PRN Temp greater or equal to 38C (100.4F), Mild Pain (1 - 3)  aluminum hydroxide/magnesium hydroxide/simethicone Suspension 30 milliLiter(s) Oral every 4 hours PRN Dyspepsia  HYDROmorphone  Injectable 0.5 milliGRAM(s) IV Push every 6 hours PRN Moderate Pain (4 - 6)  HYDROmorphone  Injectable 1 milliGRAM(s) IV Push every 6 hours PRN Severe Pain (7 - 10)  HYDROmorphone  Injectable 1 milliGRAM(s) IV Push every 4 hours PRN breakthrough pain  melatonin 3 milliGRAM(s) Oral at bedtime PRN Insomnia  ondansetron Injectable 4 milliGRAM(s) IV Push every 8 hours PRN Nausea and/or Vomiting    Pertinent Labs: CBC Full  -  ( 10 Oct 2023 07:50 )  WBC Count : 9.97 K/uL  RBC Count : 3.48 M/uL  Hemoglobin : 9.6 g/dL  Hematocrit : 31.8 %  Platelet Count - Automated : 120 K/uL  Mean Cell Volume : 91.4 fl  Mean Cell Hemoglobin : 27.6 pg  Mean Cell Hemoglobin Concentration : 30.2 gm/dL  Auto Neutrophil # : 7.40 K/uL  Auto Lymphocyte # : 1.26 K/uL  Auto Monocyte # : 0.81 K/uL  Auto Eosinophil # : 0.40 K/uL  Auto Basophil # : 0.03 K/uL  Auto Neutrophil % : 74.3 %  Auto Lymphocyte % : 12.6 %  Auto Monocyte % : 8.1 %  Auto Eosinophil % : 4.0 %  Auto Basophil % : 0.3 %  10-10 Na139 mmol/L Glu 128 mg/dL<H> K+ 3.3 mmol/L<L> Cr  1.28 mg/dL BUN 14.0 mg/dL Phos n/a   Alb n/a   PAB n/a               Skin:     Nutrition focused physical exam conducted - found signs of malnutrition [ ]absent [x ]present    Subcutaneous fat loss: [x ] Orbital fat pads region, [ x]Buccal fat region, [ ]Triceps region,  [ ]Ribs region    Muscle wasting: [x ]Temples region, [x ]Clavicle region, [ ]Shoulder region, [ ]Scapula region, [ ]Interosseous region,  [ ]thigh region, [ ]Calf region    Estimated Needs:   [ ] no change since previous assessment  [ ] recalculated:     Current Nutrition Diagnosis: Pt presents at risk secondary to Malnutrition, moderate  related to inadequate energy intake in setting of SANTOS, hx heart failure  as evidenced by meets <75% est needs  x 2 weeks, vomiting, 12# wt loss x4 mo, mild muscle/fat depletion.  Pt now on full liquid       Recommendations:   1) advance to low residue  2) Add ensure TID  3) Daily weight          Monitoring and Evaluation:   [ ] PO intake [ ] Tolerance to diet prescription [X] Weights  [X] Follow up per protocol [X] Labs:

## 2023-10-10 NOTE — PROGRESS NOTE ADULT - SUBJECTIVE AND OBJECTIVE BOX
INTERVAL HPI/OVERNIGHT EVENTS: no new complaints, tolerating clears, no overnight events      MEDICATIONS  (STANDING):  ceFAZolin  Injectable. 2000 milliGRAM(s) IV Push every 8 hours  cloNIDine Patch 0.1 mG/24Hr(s) 1 patch Transdermal every 7 days  dextrose 5% + sodium chloride 0.3% 1000 milliLiter(s) (100 mL/Hr) IV Continuous <Continuous>  heparin   Injectable 5000 Unit(s) SubCutaneous every 8 hours  labetalol Injectable 10 milliGRAM(s) IV Push every 8 hours  levETIRAcetam  IVPB 1000 milliGRAM(s) IV Intermittent every 12 hours  polyethylene glycol 3350 17 Gram(s) Oral daily  senna 2 Tablet(s) Oral at bedtime  tamsulosin 0.4 milliGRAM(s) Oral at bedtime    MEDICATIONS  (PRN):  acetaminophen     Tablet .. 650 milliGRAM(s) Oral every 6 hours PRN Temp greater or equal to 38C (100.4F), Mild Pain (1 - 3)  aluminum hydroxide/magnesium hydroxide/simethicone Suspension 30 milliLiter(s) Oral every 4 hours PRN Dyspepsia  HYDROmorphone  Injectable 0.5 milliGRAM(s) IV Push every 6 hours PRN Moderate Pain (4 - 6)  HYDROmorphone  Injectable 1 milliGRAM(s) IV Push every 6 hours PRN Severe Pain (7 - 10)  HYDROmorphone  Injectable 1 milliGRAM(s) IV Push every 4 hours PRN breakthrough pain  melatonin 3 milliGRAM(s) Oral at bedtime PRN Insomnia  ondansetron Injectable 4 milliGRAM(s) IV Push every 8 hours PRN Nausea and/or Vomiting      Vital Signs Last 24 Hrs  T(C): 36.7 (10 Oct 2023 05:00), Max: 36.9 (09 Oct 2023 20:46)  T(F): 98.1 (10 Oct 2023 05:00), Max: 98.4 (09 Oct 2023 20:46)  HR: 74 (10 Oct 2023 05:00) (67 - 80)  BP: 126/70 (10 Oct 2023 05:00) (122/71 - 150/79)  BP(mean): --  RR: 18 (10 Oct 2023 05:00) (18 - 18)  SpO2: 98% (10 Oct 2023 05:00) (95% - 98%)    Parameters below as of 10 Oct 2023 05:00  Patient On (Oxygen Delivery Method): room air        PHYSICAL EXAM:      Constitutional: NAD    Respiratory: no accessory muscle use or conversational dyspnea    Cardiovascular: RRR    Gastrointestinal: soft, NT/ND    Extremities: CORDOVA          I&O's Detail      LABS:                        9.6    9.97  )-----------( 120      ( 10 Oct 2023 07:50 )             31.8     10-10    139  |  106  |  14.0  ----------------------------<  128<H>  3.3<L>   |  25.0  |  1.28    Ca    7.4<L>      10 Oct 2023 07:50  Mg     1.8     10-10    TPro  6.6  /  Alb  2.6<L>  /  TBili  0.4  /  DBili  x   /  AST  20  /  ALT  <5  /  AlkPhos  116  10-09      Urinalysis Basic - ( 10 Oct 2023 07:50 )    Color: x / Appearance: x / SG: x / pH: x  Gluc: 128 mg/dL / Ketone: x  / Bili: x / Urobili: x   Blood: x / Protein: x / Nitrite: x   Leuk Esterase: x / RBC: x / WBC x   Sq Epi: x / Non Sq Epi: x / Bacteria: x        RADIOLOGY & ADDITIONAL STUDIES:

## 2023-10-10 NOTE — PROGRESS NOTE ADULT - ASSESSMENT
Assessment	  The patient is a 62 yo M with PMH HTN, HLD, CHF, CKD, Type A aortic dissection, and atrial fibrillation s/p ablation; PSH sternotomy, CABG x 2, MVR, AVR, L AVG;  Patient is POD 9 removal of infected L AV graft. Post op course complicated by ileus; now resolved, patient passing gas and having BM. Wound appears to be healing well.     Plan:  - Continue daily wound care with wet to dry dressing changes  - SQH q8h as DVT prophylaxis  - Continue cefazolin 2g IVP q8h as per infectious disease for endocarditis   Assessment	  The patient is a 62 yo M with PMH HTN, HLD, CHF, CKD, Type A aortic dissection, and atrial fibrillation s/p ablation; PSH sternotomy, CABG x 2, MVR, AVR, L AVG;  Patient is POD 9 removal of infected L AV graft. Post op course complicated by ileus/partial small bowel obstruction; now resolved, patient passing gas and having BM. Wound appears to be healing well.     Plan:  - Continue daily wound care with wet to dry dressing changes  - SQH q8h as DVT prophylaxis  - Continue cefazolin 2g IVP q8h as per infectious disease for endocarditis        ---- Patient seen with student, adjustments made to the above note where applicable

## 2023-10-10 NOTE — PROGRESS NOTE ADULT - ASSESSMENT
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 Patient presented to [facility] for 2 weeks of inability to tolerate oral intake. Denies any fever or chills. Has chronic diarrhea due to bowel resection. In the ER patient was afebrile. Found to have leukocytosis to 23k. Patient was found to have SANTOS. Had a febrile episode to 102F on 9/25. Started on Vancomycin and Meropenem. Blood cultures 1 of 4 bottles with Staphylococcus epidermidis      Prosthetic MV endocarditis   Staphylococcus epidermidis bacteremia  Infected thrombosed Graft   s/p Left AV graft removal 10/1/23  Fever  Leukocytosis   SANTOS   PCN allergy   Ileus       - Blood cultures 9/23, 9/25 reporting Staphylococcus epidermidis  - Repeat blood cultures  9/27 no growth   - RVP/COVID 19 PCR 9/25 negative   - CT A/P 9/23 reporting no acute findings   - CXR with ileus on 10/3  - TTE reporting MV veg  - MUSTAPHA reporting large mobile density MV  - UA 9/25 negative for UTI   - PCN allergy but has tolerated Zosyn and Zinacef in the past.   - Vascular surgery following for thrombosed graft. s/p Left AV graft removal 10/1/23  - Continue Cefazolin   - Due to renal failure will hold off on Gentamicin  - Due to Eliquis unable to use Rifampin   - Plan for Repeat MUSTAPHA on or after 10/24/23  - Follow up cultures  - Trend Fever  - Trend WBC      Will Follow    Discussed treatment plan with: Dr Snider

## 2023-10-10 NOTE — PROGRESS NOTE ADULT - SUBJECTIVE AND OBJECTIVE BOX
The patient is a 63 year old male POD9 from infected L AV graft removal. Patient seen on bedside rounds this AM; no acute events overnight. Patient states he has been passing gas and having BM. Dressing changed at bedside.     ceFAZolin  Injectable. 2000  ceFAZolin  Injectable. 2000  cloNIDine Patch 0.1 mG/24Hr(s) 1  heparin   Injectable 5000  labetalol Injectable 10    Allergies    penicillin (Other)    Intolerances      Vital Signs Last 24 Hrs  T(C): 36.7 (10 Oct 2023 05:00), Max: 36.9 (09 Oct 2023 20:46)  T(F): 98.1 (10 Oct 2023 05:00), Max: 98.4 (09 Oct 2023 20:46)  HR: 74 (10 Oct 2023 05:00) (63 - 80)  BP: 126/70 (10 Oct 2023 05:00) (122/71 - 164/74)  BP(mean): --  RR: 18 (10 Oct 2023 05:00) (18 - 18)  SpO2: 98% (10 Oct 2023 05:00) (95% - 98%)    Parameters below as of 10 Oct 2023 05:00  Patient On (Oxygen Delivery Method): room air      I&O's Detail    08 Oct 2023 07:01  -  09 Oct 2023 07:00  --------------------------------------------------------  IN:    dextrose 5% + sodium chloride 0.45%: 600 mL  Total IN: 600 mL    OUT:  Total OUT: 0 mL    Total NET: 600 mL      Physical Exam:  General: NAD, resting comfortably in bed  Pulmonary: Nonlabored breathing, no respiratory distress  Cardiovascular: NSR  Abdominal: soft, NT/ND  Extremities: LUE wound appears to be healing well; no signs of infection noted.   Pulses: Palpable ulnar pulses.      LABS:                        11.2   11.85 )-----------( 158      ( 09 Oct 2023 05:42 )             37.6     10-09    147<H>  |  110<H>  |  16.9  ----------------------------<  95  3.3<L>   |  30.0<H>  |  1.38<H>    Ca    8.3<L>      09 Oct 2023 05:42  Mg     2.3     10-09    TPro  6.6  /  Alb  2.6<L>  /  TBili  0.4  /  DBili  x   /  AST  20  /  ALT  <5  /  AlkPhos  116  10-09      CAPILLARY BLOOD GLUCOSE        Radiology and Additional Studies:    Assessment and Plan: 63yMaleAcute renal failure      CHF (congestive heart failure)    CVA (cerebral vascular accident)    Chronic kidney disease    Seizures    HTN (hypertension)    Hyperlipemia    COVID-19    Atrial fibrillation    ESRD on dialysis    Former smoker    History of cocaine use    H/O aortic dissection    H/O aortic valve insufficiency    Mitral regurgitation    GIB (gastrointestinal bleeding)    CAD (coronary artery disease)    Chronic atrial fibrillation    Aorta disorder    H/O colectomy    Status post double vessel coronary artery bypass    S/P AVR (aortic valve replacement)    S/P MVR (mitral valve replacement)    H/O aortic dissection    AV fistula    Removal, AV graft, infected     The patient is a 63 year old male POD9 from infected L AV graft removal. Patient seen on bedside rounds this AM; no acute events overnight. Patient states he has been passing gas and having BM. Dressing changed at bedside. NG has been removed.      ceFAZolin  Injectable. 2000  ceFAZolin  Injectable. 2000  cloNIDine Patch 0.1 mG/24Hr(s) 1  heparin   Injectable 5000  labetalol Injectable 10    Allergies    penicillin (Other)    Intolerances      Vital Signs Last 24 Hrs  T(C): 36.7 (10 Oct 2023 05:00), Max: 36.9 (09 Oct 2023 20:46)  T(F): 98.1 (10 Oct 2023 05:00), Max: 98.4 (09 Oct 2023 20:46)  HR: 74 (10 Oct 2023 05:00) (63 - 80)  BP: 126/70 (10 Oct 2023 05:00) (122/71 - 164/74)  BP(mean): --  RR: 18 (10 Oct 2023 05:00) (18 - 18)  SpO2: 98% (10 Oct 2023 05:00) (95% - 98%)    Parameters below as of 10 Oct 2023 05:00  Patient On (Oxygen Delivery Method): room air      I&O's Detail    08 Oct 2023 07:01  -  09 Oct 2023 07:00  --------------------------------------------------------  IN:    dextrose 5% + sodium chloride 0.45%: 600 mL  Total IN: 600 mL    OUT:  Total OUT: 0 mL    Total NET: 600 mL      Physical Exam:  General: NAD, resting comfortably in bed  Pulmonary: Nonlabored breathing, no respiratory distress  Cardiovascular: NSR  Abdominal: soft, NT/ND  Extremities: LUE wound appears to be healing well; no signs of infection noted, wound probed yesterday, no obvious hematoma/collection, ulnar signals appreciated.       LABS:                        11.2   11.85 )-----------( 158      ( 09 Oct 2023 05:42 )             37.6     10-09    147<H>  |  110<H>  |  16.9  ----------------------------<  95  3.3<L>   |  30.0<H>  |  1.38<H>    Ca    8.3<L>      09 Oct 2023 05:42  Mg     2.3     10-09    TPro  6.6  /  Alb  2.6<L>  /  TBili  0.4  /  DBili  x   /  AST  20  /  ALT  <5  /  AlkPhos  116  10-09      CAPILLARY BLOOD GLUCOSE        Radiology and Additional Studies:    Assessment and Plan: 63yMaleAcute renal failure      CHF (congestive heart failure)    CVA (cerebral vascular accident)    Chronic kidney disease    Seizures    HTN (hypertension)    Hyperlipemia    COVID-19    Atrial fibrillation    ESRD on dialysis    Former smoker    History of cocaine use    H/O aortic dissection    H/O aortic valve insufficiency    Mitral regurgitation    GIB (gastrointestinal bleeding)    CAD (coronary artery disease)    Chronic atrial fibrillation    Aorta disorder    H/O colectomy    Status post double vessel coronary artery bypass    S/P AVR (aortic valve replacement)    S/P MVR (mitral valve replacement)    H/O aortic dissection    AV fistula    Removal, AV graft, infected

## 2023-10-10 NOTE — PROGRESS NOTE ADULT - SUBJECTIVE AND OBJECTIVE BOX
[facility]  INFECTIOUS DISEASES at [place] / [place] / [place]  =======================================================                               Roosevelt Victor MD#   Fabián Quigley MD*                             Valentine Handley MD*   Reba Catalan MD*                              Professor Emeritus:  Dr Johnny Hathaway MD^            Diplomates American Board of Internal Medicine & Infectious Diseases                # [place] Office - Appt - Tel  [phone] Fax [phone]                * [place] Office - Appt - Tel [phone] Fax [phone]                      ^[place] Office - Tel  [phone] Fax [phone]                                  Hospital Consult line:  [phone]  =======================================================    JONATHAN GIBSON [id number]    Follow up: MV Endocarditis     s/p Left AV graft removal 10/1/23    no fevers   s/p NGT    Allergies:  penicillin (Other)       REVIEW OF SYSTEMS:  CONSTITUTIONAL:  No Fever or chills  HEENT:  No diplopia or blurred vision.  No earache, sore throat or runny nose.  CARDIOVASCULAR:  No chest pain  RESPIRATORY:  No cough, shortness of breath  GASTROINTESTINAL:  No nausea or vomiting.  GENITOURINARY:  No dysuria, frequency or urgency. No Blood in urine  MUSCULOSKELETAL:  no joint aches, no muscle pain  SKIN:  No change in skin, hair or nails.  NEUROLOGIC:  No Headaches      Physical Exam:  GEN: NAD  HEENT: normocephalic and atraumatic. EOMI. PERRL.    NECK: Supple.   LUNGS: CTA B/L.  HEART: RRR  ABDOMEN: Soft, NT.  +BS.  Non-distended   : No CVA tenderness  EXTREMITIES: Without  edema.  MSK: No joint swelling  NEUROLOGIC: No Focal Deficits       Vitals:  T(F): 98.1 (10 Oct 2023 05:00), Max: 98.4 (09 Oct 2023 20:46)  HR: 74 (10 Oct 2023 05:00)  BP: 126/70 (10 Oct 2023 05:00)  RR: 18 (10 Oct 2023 05:00)  SpO2: 98% (10 Oct 2023 05:00) (95% - 98%)  temp max in last 48H T(F): , Max: 98.4 (10-09-23 @ 20:46)    Current Antibiotics:  ceFAZolin  Injectable. 2000 milliGRAM(s) IV Push every 8 hours    Other medications:  cloNIDine Patch 0.1 mG/24Hr(s) 1 patch Transdermal every 7 days  dextrose 5% + sodium chloride 0.3% 1000 milliLiter(s) IV Continuous <Continuous>  heparin   Injectable 5000 Unit(s) SubCutaneous every 8 hours  labetalol Injectable 10 milliGRAM(s) IV Push every 8 hours  levETIRAcetam  IVPB 1000 milliGRAM(s) IV Intermittent every 12 hours  polyethylene glycol 3350 17 Gram(s) Oral daily  senna 2 Tablet(s) Oral at bedtime  tamsulosin 0.4 milliGRAM(s) Oral at bedtime                            9.6    9.97  )-----------( 120      ( 10 Oct 2023 07:50 )             31.8     10-10    139  |  106  |  14.0  ----------------------------<  128<H>  3.3<L>   |  25.0  |  1.28    Ca    7.4<L>      10 Oct 2023 07:50  Mg     1.8     10-10    TPro  6.6  /  Alb  2.6<L>  /  TBili  0.4  /  DBili  x   /  AST  20  /  ALT  <5  /  AlkPhos  116  10-09    RECENT CULTURES:  10-01 @ 12:00 .Abscess arm abscess     No growth at 5 days  Upon re-evaluation of gram stain:  No organisms seen per oil power field  Moderate polymorphonuclear leukocytes seen per low power field  Previously reported as:  Rare Gram positive cocci in pairs seen per oil power field  Rare Gram Negative Rods seen per oil power field    09-27 @ 14:47 .Blood Blood-Peripheral     No growth at 5 days    09-27 @ 14:42 .Blood Blood-Peripheral     No growth at 5 days      WBC Count: 9.97 K/uL (10-10-23 @ 07:50)  WBC Count: 11.85 K/uL (10-09-23 @ 05:42)  WBC Count: 10.12 K/uL (10-08-23 @ 04:10)  WBC Count: 8.12 K/uL (10-07-23 @ 07:48)  WBC Count: 6.26 K/uL (10-06-23 @ 06:45)    Creatinine: 1.28 mg/dL (10-10-23 @ 07:50)  Creatinine: 1.38 mg/dL (10-09-23 @ 05:42)  Creatinine: 1.50 mg/dL (10-08-23 @ 04:10)  Creatinine: 1.58 mg/dL (10-07-23 @ 07:48)  Creatinine: 1.71 mg/dL (10-06-23 @ 06:45)     SARS-CoV-2: NotDetec (09-25-23 @ 06:00)          < from: Xray Chest 1 View-PORTABLE IMMEDIATE (Xray Chest 1 View-PORTABLE IMMEDIATE .) (10.06.23 @ 06:21) >  ACC: [id number] EXAM:  XR CHEST PORTABLE IMMED 1V   ORDERED BY: MARIA C VANESSA     ACC: [id number] EXAM:  XR CHEST PORTABLE IMMED 1V   ORDERED BY: MARIA C VANESSA     PROCEDURE DATE:  10/06/2023      INTERPRETATION:  AP chest on October 6, 2023 at 4:34 AM. Concern is NG   tube.    Heart magnified by technique. Sternotomy 2 prostatic heart valves again   noted. Lungs are clear.    NG tube again noted. Tip is in the antrum.    Follow-up AP chest on October 6, 2020 23 x 17 a.m. Patient has NG tube   placement.    Heart magnified by technique.    Sternotomy and valve replacements noted.    Lungs are clear.    NG tube is again noted. On study of 4:34 AM was in the stomach but the NG   tube tip is barely in the stomach at this time.    IMPRESSION: NG tubetip is barely in the stomach at this time.    --- End of Report ---  < end of copied text >      < from: MUSTAPHA Echo Doppler (09.27.23 @ 10:59) >  Summary:   1. Left ventricular ejection fraction, by visual estimation, is 55 to 60%.   2. Severely enlarged left atrium.   3. Mild mitral valve regurgitation.   4. Mitral prosthesis vegetation.   5. There is a bioprosthetic valve in the mitral position with thickened   leaflets yet leaflet mobility is fairly preserved. There is trace to mild   valvular regurgitation with no perivalvular leaks. No rocking motion or   dehiscence noted. There is a large and mobile vegetation measuring   1.8x1.0 cm attached to the atrial surface of the medial bioprosthetic   leaflet. There is evidence of at least mild valve stenosis with a mean   transvalvular gradient of 5 mmHg at a heart rate of 60 bpm. The mitral   valve area by 3D is 1.57 cm2.   6. Mild tricuspid regurgitation.   7. Bioprosthesis in the aortic position.   8. Dilatation of the aortic root.    < end of copied text >        < from: TTE Echo Complete w/o Contrast w/ Doppler (09.26.23 @ 14:32) >  Summary:   1. Left ventricular ejection fraction, by visual estimation, is 50 to   55%.   2. Normal global left ventricular systolic function.   3. Diastolic function indeterminate.   4. Abnormal septal motion consistent with post-operative status.   5. Normal RV size with mildly reduced RV systolic function, inadequate   estimation of RVSP.   6. Bioprosthetic mitral valve present with a large mobile lesion (at   least 2.0 cm x 1.5 cm) prolapsing into and out of the LV-LA cavity,   hightly suggestive of vegetation. Mean transmitral valve gradient 4.8   mmHg (at HR of 60 bpm).   7. Bioprosthesis in the aortic position, mean gradient of 9 mmHg.   8. There is no evidence of pericardial effusion.   9. Compared to the prior TTE study from 5/23/23, lesion over bioMVR is   again noted and is now much larger and recommend MUSTAPHA study to confirm   vegetation and exclude other valvular involvement.  < end of copied text >

## 2023-10-11 LAB
ANION GAP SERPL CALC-SCNC: 11 MMOL/L — SIGNIFICANT CHANGE UP (ref 5–17)
BASOPHILS # BLD AUTO: 0.04 K/UL — SIGNIFICANT CHANGE UP (ref 0–0.2)
BASOPHILS NFR BLD AUTO: 0.4 % — SIGNIFICANT CHANGE UP (ref 0–2)
BUN SERPL-MCNC: 18.7 MG/DL — SIGNIFICANT CHANGE UP (ref 8–20)
CALCIUM SERPL-MCNC: 7.7 MG/DL — LOW (ref 8.4–10.5)
CHLORIDE SERPL-SCNC: 103 MMOL/L — SIGNIFICANT CHANGE UP (ref 96–108)
CO2 SERPL-SCNC: 22 MMOL/L — SIGNIFICANT CHANGE UP (ref 22–29)
CREAT SERPL-MCNC: 1.48 MG/DL — HIGH (ref 0.5–1.3)
EGFR: 53 ML/MIN/1.73M2 — LOW
EOSINOPHIL # BLD AUTO: 0.51 K/UL — HIGH (ref 0–0.5)
EOSINOPHIL NFR BLD AUTO: 5.2 % — SIGNIFICANT CHANGE UP (ref 0–6)
GLUCOSE SERPL-MCNC: 89 MG/DL — SIGNIFICANT CHANGE UP (ref 70–99)
HCT VFR BLD CALC: 31.2 % — LOW (ref 39–50)
HGB BLD-MCNC: 9.8 G/DL — LOW (ref 13–17)
IMM GRANULOCYTES NFR BLD AUTO: 0.6 % — SIGNIFICANT CHANGE UP (ref 0–0.9)
LYMPHOCYTES # BLD AUTO: 1.08 K/UL — SIGNIFICANT CHANGE UP (ref 1–3.3)
LYMPHOCYTES # BLD AUTO: 11 % — LOW (ref 13–44)
MAGNESIUM SERPL-MCNC: 1.7 MG/DL — SIGNIFICANT CHANGE UP (ref 1.6–2.6)
MCHC RBC-ENTMCNC: 28.7 PG — SIGNIFICANT CHANGE UP (ref 27–34)
MCHC RBC-ENTMCNC: 31.4 GM/DL — LOW (ref 32–36)
MCV RBC AUTO: 91.2 FL — SIGNIFICANT CHANGE UP (ref 80–100)
MONOCYTES # BLD AUTO: 0.71 K/UL — SIGNIFICANT CHANGE UP (ref 0–0.9)
MONOCYTES NFR BLD AUTO: 7.2 % — SIGNIFICANT CHANGE UP (ref 2–14)
NEUTROPHILS # BLD AUTO: 7.4 K/UL — SIGNIFICANT CHANGE UP (ref 1.8–7.4)
NEUTROPHILS NFR BLD AUTO: 75.6 % — SIGNIFICANT CHANGE UP (ref 43–77)
PLATELET # BLD AUTO: 108 K/UL — LOW (ref 150–400)
POTASSIUM SERPL-MCNC: 4.1 MMOL/L — SIGNIFICANT CHANGE UP (ref 3.5–5.3)
POTASSIUM SERPL-SCNC: 4.1 MMOL/L — SIGNIFICANT CHANGE UP (ref 3.5–5.3)
RBC # BLD: 3.42 M/UL — LOW (ref 4.2–5.8)
RBC # FLD: 14.2 % — SIGNIFICANT CHANGE UP (ref 10.3–14.5)
SODIUM SERPL-SCNC: 136 MMOL/L — SIGNIFICANT CHANGE UP (ref 135–145)
WBC # BLD: 9.8 K/UL — SIGNIFICANT CHANGE UP (ref 3.8–10.5)
WBC # FLD AUTO: 9.8 K/UL — SIGNIFICANT CHANGE UP (ref 3.8–10.5)

## 2023-10-11 PROCEDURE — 99232 SBSQ HOSP IP/OBS MODERATE 35: CPT

## 2023-10-11 PROCEDURE — 99233 SBSQ HOSP IP/OBS HIGH 50: CPT

## 2023-10-11 RX ADMIN — Medication 2000 MILLIGRAM(S): at 11:37

## 2023-10-11 RX ADMIN — HYDROMORPHONE HYDROCHLORIDE 1 MILLIGRAM(S): 2 INJECTION INTRAMUSCULAR; INTRAVENOUS; SUBCUTANEOUS at 22:45

## 2023-10-11 RX ADMIN — LEVETIRACETAM 400 MILLIGRAM(S): 250 TABLET, FILM COATED ORAL at 05:46

## 2023-10-11 RX ADMIN — Medication 2000 MILLIGRAM(S): at 05:46

## 2023-10-11 RX ADMIN — AMLODIPINE BESYLATE 10 MILLIGRAM(S): 2.5 TABLET ORAL at 05:46

## 2023-10-11 RX ADMIN — TAMSULOSIN HYDROCHLORIDE 0.4 MILLIGRAM(S): 0.4 CAPSULE ORAL at 22:25

## 2023-10-11 RX ADMIN — HYDROMORPHONE HYDROCHLORIDE 0.5 MILLIGRAM(S): 2 INJECTION INTRAMUSCULAR; INTRAVENOUS; SUBCUTANEOUS at 15:23

## 2023-10-11 RX ADMIN — HYDROMORPHONE HYDROCHLORIDE 1 MILLIGRAM(S): 2 INJECTION INTRAMUSCULAR; INTRAVENOUS; SUBCUTANEOUS at 22:29

## 2023-10-11 RX ADMIN — CARVEDILOL PHOSPHATE 6.25 MILLIGRAM(S): 80 CAPSULE, EXTENDED RELEASE ORAL at 05:46

## 2023-10-11 RX ADMIN — Medication 650 MILLIGRAM(S): at 06:00

## 2023-10-11 RX ADMIN — Medication 2000 MILLIGRAM(S): at 22:29

## 2023-10-11 RX ADMIN — HYDROMORPHONE HYDROCHLORIDE 1 MILLIGRAM(S): 2 INJECTION INTRAMUSCULAR; INTRAVENOUS; SUBCUTANEOUS at 02:16

## 2023-10-11 RX ADMIN — APIXABAN 5 MILLIGRAM(S): 2.5 TABLET, FILM COATED ORAL at 05:46

## 2023-10-11 RX ADMIN — Medication 650 MILLIGRAM(S): at 05:48

## 2023-10-11 RX ADMIN — HYDROMORPHONE HYDROCHLORIDE 1 MILLIGRAM(S): 2 INJECTION INTRAMUSCULAR; INTRAVENOUS; SUBCUTANEOUS at 01:16

## 2023-10-11 RX ADMIN — Medication 1 PATCH: at 19:00

## 2023-10-11 RX ADMIN — CARVEDILOL PHOSPHATE 6.25 MILLIGRAM(S): 80 CAPSULE, EXTENDED RELEASE ORAL at 17:25

## 2023-10-11 RX ADMIN — LEVETIRACETAM 400 MILLIGRAM(S): 250 TABLET, FILM COATED ORAL at 17:25

## 2023-10-11 RX ADMIN — APIXABAN 5 MILLIGRAM(S): 2.5 TABLET, FILM COATED ORAL at 17:25

## 2023-10-11 NOTE — PROGRESS NOTE ADULT - ASSESSMENT
Assessment	  The patient is a 64 yo M with PMH HTN, HLD, CHF, CKD, Type A aortic dissection, and atrial fibrillation s/p ablation; PSH sternotomy, CABG x 2, MVR, AVR, L AVG;  Patient is POD 9 removal of infected L AV graft. Post op course complicated by ileus/partial small bowel obstruction; now resolved, patient passing gas and having BM. Wound appears to be healing well.     Plan:  - Continue daily wound care with wet to dry dressing changes  - SQH q8h as DVT prophylaxis  - Continue cefazolin 2g IVP q8h as per infectious disease for endocarditis         Assessment	  The patient is a 64 yo M with PMH HTN, HLD, CHF, CKD, Type A aortic dissection, and atrial fibrillation s/p ablation; PSH sternotomy, CABG x 2, MVR, AVR, L AVG;  Patient is POD 10 removal of infected L AV graft. Post op course complicated by ileus/partial small bowel obstruction; now resolved, patient passing gas and having BM. Wound appears to be healing well.     Plan:  - Continue daily wound care with wet to dry dressing changes  - SQH q8h as DVT prophylaxis  - Continue cefazolin 2g IVP q8h as per infectious disease for endocarditis

## 2023-10-11 NOTE — PROGRESS NOTE ADULT - SUBJECTIVE AND OBJECTIVE BOX
NEPHROLOGY INTERVAL HPI/OVERNIGHT EVENTS:  pt comfortable  no acute overnight issues    MEDICATIONS  (STANDING):  amLODIPine   Tablet 10 milliGRAM(s) Oral daily  apixaban 5 milliGRAM(s) Oral every 12 hours  carvedilol 6.25 milliGRAM(s) Oral every 12 hours  ceFAZolin  Injectable. 2000 milliGRAM(s) IV Push every 8 hours  cloNIDine Patch 0.1 mG/24Hr(s) 1 patch Transdermal every 7 days  levETIRAcetam  IVPB 1000 milliGRAM(s) IV Intermittent every 12 hours  polyethylene glycol 3350 17 Gram(s) Oral daily  senna 2 Tablet(s) Oral at bedtime  tamsulosin 0.4 milliGRAM(s) Oral at bedtime    MEDICATIONS  (PRN):  acetaminophen     Tablet .. 650 milliGRAM(s) Oral every 6 hours PRN Temp greater or equal to 38C (100.4F), Mild Pain (1 - 3)  aluminum hydroxide/magnesium hydroxide/simethicone Suspension 30 milliLiter(s) Oral every 4 hours PRN Dyspepsia  HYDROmorphone  Injectable 0.5 milliGRAM(s) IV Push every 6 hours PRN Moderate Pain (4 - 6)  HYDROmorphone  Injectable 1 milliGRAM(s) IV Push every 6 hours PRN Severe Pain (7 - 10)  melatonin 3 milliGRAM(s) Oral at bedtime PRN Insomnia  ondansetron Injectable 4 milliGRAM(s) IV Push every 8 hours PRN Nausea and/or Vomiting      Allergies    penicillin (Other)        Vital Signs Last 24 Hrs  T(C): 36.7 (11 Oct 2023 09:22), Max: 36.9 (10 Oct 2023 21:33)  T(F): 98.1 (11 Oct 2023 09:22), Max: 98.5 (10 Oct 2023 21:33)  HR: 56 (11 Oct 2023 09:22) (56 - 87)  BP: 94/52 (11 Oct 2023 09:22) (94/52 - 115/63)  BP(mean): --  RR: 18 (11 Oct 2023 09:22) (18 - 18)  SpO2: 94% (11 Oct 2023 09:22) (94% - 96%)    Parameters below as of 11 Oct 2023 09:22  Patient On (Oxygen Delivery Method): room air        PHYSICAL EXAM:  GENERAL: Comfortable  NECK: Supple, no JVD  NERVOUS SYSTEM:  A/O x3  CHEST: Clear B/L   HEART:  RRR, No rub  ABDOMEN: Soft, NT/ND BS+  EXTREMITIES:  No dependent edema    LABS:                        9.8    9.80  )-----------( 108      ( 11 Oct 2023 06:07 )             31.2     10-11    136  |  103  |  18.7  ----------------------------<  89  4.1   |  22.0  |  1.48<H>    Ca    7.7<L>      11 Oct 2023 06:07  Mg     1.7     10-11        Urinalysis Basic - ( 11 Oct 2023 06:07 )    Color: x / Appearance: x / SG: x / pH: x  Gluc: 89 mg/dL / Ketone: x  / Bili: x / Urobili: x   Blood: x / Protein: x / Nitrite: x   Leuk Esterase: x / RBC: x / WBC x   Sq Epi: x / Non Sq Epi: x / Bacteria: x      Magnesium: 1.7 mg/dL (10-11 @ 06:07)      RADIOLOGY & ADDITIONAL TESTS:  < from: CT Angio Abdomen w/ IV Cont (10.04.23 @ 12:04) >    ACC: [id number] EXAM:  CT ANGIO ABDOMEN ONLY (W)AW IC   ORDERED BY: ALIZE ALMANZAR     PROCEDURE DATE:  10/04/2023          INTERPRETATION:  CLINICAL INFORMATION: Right renal upper pole lesion.    COMPARISON: Contrast-enhanced CT of the abdomen and pelvis October 3,   2023, May 22, 2023, June 23, 2011CT angiogram December 13, 2022, June 25, 2021, pre and postcontrast CT of the abdomen and pelvis March 12, 2021.    CONTRAST/COMPLICATIONS:  IV Contrast: Omnipaque 350  94 cc administered   6 cc discarded  Oral Contrast: NONE  Complications: None reported at time of study completion    PROCEDURE:  CT of the Abdomen and Pelvis was performed.  Precontrast, Arterial and Delayed phases were acquired.  Sagittal and coronal reformats were performed.    FINDINGS:  LOWER CHEST: Again are noted peribronchovascular nodular and confluent   opacities at the lung bases. Mitral valve replacement.    LIVER: Within normal limits.  BILE DUCTS: Normal caliber.  GALLBLADDER: Layering gallstone. Vicarious excretion of contrast.  SPLEEN: Within normal limits.  PANCREAS: Within normal limits. Incidental pancreas ductus divisum.  ADRENALS: Within normal limits.  KIDNEYS/URETERS: Again is noted a heterogeneously hyperenhancing 1.9 x   1.4 cm lesion withwashout in the posterior upper pole of the right   kidney at site of the previous thinly septated cystic lesion in June 2011. There are bilateral simple renal cysts. Again is noted a   wedge-shaped region of delayed enhancement in the lateral midpole of the   left kidney, compatible with subacute infarct.    Otherwise, the kidneys are symmetric in appearance and enhancement with   no evidence of hydronephrosis, stone, or perinephric stranding   bilaterally. The renal veins are patent. There is no paracaval or   para-aortic adenopathy.    BLADDER: Within normal limits.  REPRODUCTIVE ORGANS: The prostate is not enlarged.    BOWEL: Rectosigmoid resection with reanastomosis. Small bowel resection   with reanastomosis. Again are noted fluid-filled patulous loops of small   and large bowel. No bowel obstruction. There is no mesenteric adenopathy.  PERITONEUM: No ascites.  VESSELS: Again is noted a dissection of the descending thoracic aorta and   abdominal aorta extending to the right external iliac artery and into the   bilateral renal arteries.  RETROPERITONEUM/LYMPH NODES: No lymphadenopathy. Shotty aortocaval lymph   nodes.  ABDOMINAL WALL: Within normal limits.  BONES: Posterior spondylitic ridging at L4-L5.    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 No evidence of   adenopathy.    < end of copied text >

## 2023-10-11 NOTE — PROGRESS NOTE ADULT - ASSESSMENT
62 y/o M w/ PMH of HTN, HLD, Type A dissection w/ bowel ischemia requiring bowel resection w/ repeat sternotomy, and CABG x2, CHF (mixed systolic/diastolic), MV and AV replacement, CKD III (with short term on HD 2022), AF s/p ablation pending watchman, SDH (resolved as of July imaging) who presented to [facility] for 2wks of inability to tolerate PO intake.  Labs consistent w/ SANTOS on CKD.  Hospital course complicated by bacteremia w/ cxs growing staph epidermidus and TTE showing large MV endocarditis. Vascular consulted with concern of Infected prosthetic vascular graft,  Patient is now s/p removal of L AV graft. Was monitor in SICU and then transfer to floor. Vascular requesting transfer back to medicine service. Patient hospital course now complicated by Ileus vs SBO. General surgery was consulted. NGT was placed and now removed.     Sepsis POA 2/2 Staphylococcus epidermidis bacteremia likely endovascular in origin w/ subsequent MV IE   Afebrile, WBC normalized   - Repeat BCXs from 09/27 NGTD  - LUE AV graft concerning for infectious source of bacteremia leading to IE now s/p removal on 10/1 by vascular  - s/p MUSTAPHA w/ evidence of large MV vegetation   - c/w cefazolin as per ID recs   - Plan for Repeat MUSTAPHA on or after 10/24/23 per ID  - CT A/P re demonstrates aortic dissection stable from 05/2023 but no acute findings reported   - CTSx consulted for consideration of re-operation of MVR   - As per CTSx will hold off on surgery for now and on contrast studies given pt high surgical risk and risk of progressing to HD again   - If cxs remain negative can tx w/ 4wks of abxs repeat MUSTAPHA to see if vegetation improving as per CTSx  - Monitor CBC and temperature    Ileus vs SBO   Tolerating liquid diet   Will advance to regular    Out of bed to chair and ambulation recommended         SANTOS on CKD III likely prerenal azotemia 2/2 GI losses and poor po intake  - Cr improved  since admission (baseline 1.5-1.8),   - Continue to hold of on diuretic given hypovolemia   - Avoid nephrotoxic meds or renally dose if needed       Chronic HFpEF  - c/w Coreg  - Torsemide held in light of dehydration on admission resume on d/c or sooner if needed  - MUSTAPHA w/ LVEF 55-60%  - Monitor daily weights and I/O's  - Cardio following and recs noted    Paroxysmal Atrial fibrillation   - Rate controlled currently   - CHADSVASC of 5 and as per cardiology no need to bridge   Resumed Eliquis on 10/10      HTN - BP better controlled   will resume oral medications: amlodipine and Carvedilol   dc Labetalol IV         Hx of SDH   - CTH has shown complete resolution since July 2023    Mitral/Aortic valve replacement   - Recent MUSTAPHA shows bioprosthetic valves with only moderate paravalvular leak in MV  - Now w/ large vegetation on MV  - CTSx following but no plan for surgical  intervention at this time      VTE ppx: HSQ  Dispo: remains acute

## 2023-10-11 NOTE — PROGRESS NOTE ADULT - SUBJECTIVE AND OBJECTIVE BOX
Patient is a 63y old  Male who presents with a chief complaint of Intractable N/V, SANTOS (07 Oct 2023 05:47)      INTERVAL HPI/OVERNIGHT EVENTS: seen and examined. c/o fatigue   Asking for solid food       MEDICATIONS  (STANDING):  amLODIPine   Tablet 10 milliGRAM(s) Oral daily  atorvastatin 40 milliGRAM(s) Oral at bedtime  ceFAZolin  Injectable. 2000 milliGRAM(s) IV Push every 8 hours  dextrose 5% + sodium chloride 0.45% with potassium chloride 10 mEq/L 1000 milliLiter(s) (75 mL/Hr) IV Continuous <Continuous>  heparin   Injectable 5000 Unit(s) SubCutaneous every 8 hours  levETIRAcetam  IVPB 1000 milliGRAM(s) IV Intermittent every 12 hours  polyethylene glycol 3350 17 Gram(s) Oral daily  senna 2 Tablet(s) Oral at bedtime  tamsulosin 0.4 milliGRAM(s) Oral at bedtime    MEDICATIONS  (PRN):  acetaminophen     Tablet .. 650 milliGRAM(s) Oral every 6 hours PRN Temp greater or equal to 38C (100.4F), Mild Pain (1 - 3)  aluminum hydroxide/magnesium hydroxide/simethicone Suspension 30 milliLiter(s) Oral every 4 hours PRN Dyspepsia  hydrALAZINE Injectable 10 milliGRAM(s) IV Push every 6 hours PRN SBP>170  HYDROmorphone  Injectable 0.5 milliGRAM(s) IV Push every 6 hours PRN Moderate Pain (4 - 6)  HYDROmorphone  Injectable 1 milliGRAM(s) IV Push every 4 hours PRN breakthru pain  or prior o dressing changes  HYDROmorphone  Injectable 1 milliGRAM(s) IV Push every 6 hours PRN Severe Pain (7 - 10)  melatonin 3 milliGRAM(s) Oral at bedtime PRN Insomnia  ondansetron Injectable 4 milliGRAM(s) IV Push every 8 hours PRN Nausea and/or Vomiting      Allergies    penicillin (Other)    Intolerances        REVIEW OF SYSTEMS:  CONSTITUTIONAL: No fever, weight loss, or fatigue  RESPIRATORY: No cough, wheezing, chills or hemoptysis; No shortness of breath  CARDIOVASCULAR: No chest pain, palpitations, dizziness, or leg swelling  GASTROINTESTINAL: No abdominal or epigastric pain. No nausea, vomiting, or hematemesis; No diarrhea or constipation. No melena or hematochezia.  NEUROLOGICAL: No headaches, memory loss, loss of strength, numbness, or tremors  MUSCULOSKELETAL: No joint pain or swelling; No muscle, back, or extremity pain      Vital Signs Last 24 Hrs  T(C): 36.7 (07 Oct 2023 08:22), Max: 36.8 (07 Oct 2023 00:00)  T(F): 98 (07 Oct 2023 08:22), Max: 98.2 (07 Oct 2023 00:00)  HR: 62 (07 Oct 2023 08:22) (60 - 75)  BP: 180/82 (07 Oct 2023 08:22) (147/85 - 184/76)  BP(mean): --  RR: 18 (07 Oct 2023 08:22) (18 - 18)  SpO2: 95% (07 Oct 2023 08:22) (92% - 97%)    Parameters below as of 07 Oct 2023 08:22  Patient On (Oxygen Delivery Method): room air        PHYSICAL EXAM:  GENERAL: NAD, laying in bed   HEAD:  Atraumatic, Normocephalic  EYES: EOMI, PERRLA, conjunctiva and sclera clear  NECK: Supple, No JVD,  NERVOUS SYSTEM:  Alert & Oriented X3, No gross focal deficits  CHEST/LUNG: Clear to percussion bilaterally; No rales, rhonchi, wheezing, or rubs  HEART: Regular rate and rhythm; No murmurs, rubs, or gallops  ABDOMEN: Soft, Nontender, Nondistended; Bowel sounds present, large midabdominal scar   EXTREMITIES:  No clubbing, cyanosis, or edema  SKIN: No rashes or lesions    LABS:                        10.2   8.12  )-----------( 163      ( 07 Oct 2023 07:48 )             34.2     10-07    148<H>  |  110<H>  |  29.1<H>  ----------------------------<  113<H>  3.6   |  27.0  |  1.58<H>    Ca    8.4      07 Oct 2023 07:48  Phos  2.4     10-07  Mg     2.5     10-07    TPro  6.9  /  Alb  2.6<L>  /  TBili  0.6  /  DBili  x   /  AST  22  /  ALT  <5  /  AlkPhos  111  10-06      Urinalysis Basic - ( 07 Oct 2023 07:48 )    Color: x / Appearance: x / SG: x / pH: x  Gluc: 113 mg/dL / Ketone: x  / Bili: x / Urobili: x   Blood: x / Protein: x / Nitrite: x   Leuk Esterase: x / RBC: x / WBC x   Sq Epi: x / Non Sq Epi: x / Bacteria: x      CAPILLARY BLOOD GLUCOSE          RADIOLOGY & ADDITIONAL TESTS:    Imaging Personally Reviewed:  [ x] YES  [ ] NO    Consultant(s) Notes Reviewed:  [x ] YES  [ ] NO    Care Discussed with Consultants/Other Providers [ ] YES  [ ] NO    Plan of Care discussed with Housestaff [ x]YES [ ] NO

## 2023-10-11 NOTE — PROGRESS NOTE ADULT - SUBJECTIVE AND OBJECTIVE BOX
[facility]  INFECTIOUS DISEASES at [place] / [place] / [place]  =======================================================                               Roosevelt Victor MD#   Fabián Quigley MD*                             Valentine Handley MD*   Reba Catalan MD*                              Professor Emeritus:  Dr Johnny Hathaway MD^            Diplomates American Board of Internal Medicine & Infectious Diseases                # [place] Office - Appt - Tel  [phone] Fax [phone]                * [place] Office - Appt - Tel [phone] Fax [phone]                      ^[place] Office - Tel  [phone] Fax [phone]                                  Hospital Consult line:  [phone]  =======================================================    JONATHAN GIBSON [id number]    Follow up: MV Endocarditis     s/p Left AV graft removal 10/1/23    no fevers   Tolerating liquid diet     Allergies:  penicillin (Other)       REVIEW OF SYSTEMS:  CONSTITUTIONAL:  No Fever or chills  HEENT:  No diplopia or blurred vision.  No earache, sore throat or runny nose.  CARDIOVASCULAR:  No chest pain  RESPIRATORY:  No cough, shortness of breath  GASTROINTESTINAL:  No nausea or vomiting.  GENITOURINARY:  No dysuria, frequency or urgency. No Blood in urine  MUSCULOSKELETAL:  no joint aches, no muscle pain  SKIN:  No change in skin, hair or nails.  NEUROLOGIC:  No Headaches      Physical Exam:  GEN: NAD  HEENT: normocephalic and atraumatic. EOMI. PERRL.    NECK: Supple.   LUNGS: CTA B/L.  HEART: RRR  ABDOMEN: Soft, NT.  +BS.  Non-distended   : No CVA tenderness  EXTREMITIES: Without  edema.  MSK: No joint swelling  NEUROLOGIC: No Focal Deficits       Vitals:  T(F): 98 (11 Oct 2023 05:00), Max: 98.5 (10 Oct 2023 21:33)  HR: 87 (11 Oct 2023 05:00)  BP: 113/53 (11 Oct 2023 05:00)  RR: 18 (11 Oct 2023 05:00)  SpO2: 95% (11 Oct 2023 05:00) (95% - 97%)  temp max in last 48H T(F): , Max: 98.5 (10-10-23 @ 21:33)    Current Antibiotics:  ceFAZolin  Injectable. 2000 milliGRAM(s) IV Push every 8 hours    Other medications:  amLODIPine   Tablet 10 milliGRAM(s) Oral daily  apixaban 5 milliGRAM(s) Oral every 12 hours  carvedilol 6.25 milliGRAM(s) Oral every 12 hours  cloNIDine Patch 0.1 mG/24Hr(s) 1 patch Transdermal every 7 days  levETIRAcetam  IVPB 1000 milliGRAM(s) IV Intermittent every 12 hours  polyethylene glycol 3350 17 Gram(s) Oral daily  senna 2 Tablet(s) Oral at bedtime  tamsulosin 0.4 milliGRAM(s) Oral at bedtime                 9.6    9.97  )-----------( 120      ( 10 Oct 2023 07:50 )             31.8     10-10    139  |  106  |  14.0  ----------------------------<  128<H>  3.3<L>   |  25.0  |  1.28    Ca    7.4<L>      10 Oct 2023 07:50  Mg     1.8     10-10      RECENT CULTURES:  10-01 @ 12:00 .Abscess arm abscess     No growth at 5 days  Upon re-evaluation of gram stain:  No organisms seen per oil power field  Moderate polymorphonuclear leukocytes seen per low power field  Previously reported as:  Rare Gram positive cocci in pairs seen per oil power field  Rare Gram Negative Rods seen per oil power field    09-27 @ 14:47 .Blood Blood-Peripheral     No growth at 5 days    09-27 @ 14:42 .Blood Blood-Peripheral     No growth at 5 days      WBC Count: 9.97 K/uL (10-10-23 @ 07:50)  WBC Count: 11.85 K/uL (10-09-23 @ 05:42)  WBC Count: 10.12 K/uL (10-08-23 @ 04:10)  WBC Count: 8.12 K/uL (10-07-23 @ 07:48)  WBC Count: 6.26 K/uL (10-06-23 @ 06:45)    Creatinine: 1.28 mg/dL (10-10-23 @ 07:50)  Creatinine: 1.38 mg/dL (10-09-23 @ 05:42)  Creatinine: 1.50 mg/dL (10-08-23 @ 04:10)  Creatinine: 1.58 mg/dL (10-07-23 @ 07:48)  Creatinine: 1.71 mg/dL (10-06-23 @ 06:45)    SARS-CoV-2: NotDetec (09-25-23 @ 06:00)      < from: Xray Abdomen 1 View PORTABLE -Urgent (Xray Abdomen 1 View PORTABLE -Urgent .) (10.09.23 @ 06:11) >  ACC: [id number] EXAM:  XR KUB 1 VIEW   ORDERED BY: AARTI DON     ACC: [id number] EXAM:  XR ABDOMEN PORTABLE URGENT 1V   ORDERED BY: ARASH VALERIO     PROCEDURE DATE:  10/09/2023      INTERPRETATION:  EXAM: XR ABDOMEN URGENT, XR ABDOMEN KUB    INDICATION: Admitting Dxs: N17.9 ACUTE KIDNEY FAILURE, UNSPECIFIED GGC XXR    COMPARISON: See below    IMPRESSION:    Portable abdomen October 9 at 5:49 AM: Exam is compared to October 8 at   2:28 PM    Contrast seen in the stomach related to the NG tube is not obvious on   this exam. Small amount of contrast overlying the left midabdomen is not   present. There is contrast overlying the pelvis slightly more prominent   on present exam than on previous exam. Ileus pattern is again noted in   this patient was had previous bowel surgery continued follow-up suggested    Portable abdomen October 8 at 2:28 PM: Exam is compared to October 6 at   12:21 AM. NG tube is noted with contrast opacifying Fuget folds in the   stomach and there is some contrast overlying the mid left lateral   abdomen. Contrast is also noted overlying the pelvic region. Patient has   had bowel surgery in the past the results of which are available under   separate cover. Distended bowel loops are noted. NG tube is new since   previous exam. Continued follow-up suggested    --- End of Report ---    < end of copied text >        < from: MUSTAPHA Echo Doppler (09.27.23 @ 10:59) >  Summary:   1. Left ventricular ejection fraction, by visual estimation, is 55 to 60%.   2. Severely enlarged left atrium.   3. Mild mitral valve regurgitation.   4. Mitral prosthesis vegetation.   5. There is a bioprosthetic valve in the mitral position with thickened   leaflets yet leaflet mobility is fairly preserved. There is trace to mild   valvular regurgitation with no perivalvular leaks. No rocking motion or   dehiscence noted. There is a large and mobile vegetation measuring   1.8x1.0 cm attached to the atrial surface of the medial bioprosthetic   leaflet. There is evidence of at least mild valve stenosis with a mean   transvalvular gradient of 5 mmHg at a heart rate of 60 bpm. The mitral   valve area by 3D is 1.57 cm2.   6. Mild tricuspid regurgitation.   7. Bioprosthesis in the aortic position.   8. Dilatation of the aortic root.    < end of copied text >        < from: TTE Echo Complete w/o Contrast w/ Doppler (09.26.23 @ 14:32) >  Summary:   1. Left ventricular ejection fraction, by visual estimation, is 50 to   55%.   2. Normal global left ventricular systolic function.   3. Diastolic function indeterminate.   4. Abnormal septal motion consistent with post-operative status.   5. Normal RV size with mildly reduced RV systolic function, inadequate   estimation of RVSP.   6. Bioprosthetic mitral valve present with a large mobile lesion (at   least 2.0 cm x 1.5 cm) prolapsing into and out of the LV-LA cavity,   hightly suggestive of vegetation. Mean transmitral valve gradient 4.8   mmHg (at HR of 60 bpm).   7. Bioprosthesis in the aortic position, mean gradient of 9 mmHg.   8. There is no evidence of pericardial effusion.   9. Compared to the prior TTE study from 5/23/23, lesion over bioMVR is   again noted and is now much larger and recommend MUSTAPHA study to confirm   vegetation and exclude other valvular involvement.  < end of copied text >

## 2023-10-11 NOTE — PROGRESS NOTE ADULT - SUBJECTIVE AND OBJECTIVE BOX
INTERVAL HPI/OVERNIGHT EVENTS:    Patient evaluated at bedside. No acute distress. No acute events overnight.    MEDICATIONS  (STANDING):  amLODIPine   Tablet 10 milliGRAM(s) Oral daily  apixaban 5 milliGRAM(s) Oral every 12 hours  carvedilol 6.25 milliGRAM(s) Oral every 12 hours  ceFAZolin  Injectable. 2000 milliGRAM(s) IV Push every 8 hours  cloNIDine Patch 0.1 mG/24Hr(s) 1 patch Transdermal every 7 days  levETIRAcetam  IVPB 1000 milliGRAM(s) IV Intermittent every 12 hours  polyethylene glycol 3350 17 Gram(s) Oral daily  senna 2 Tablet(s) Oral at bedtime  tamsulosin 0.4 milliGRAM(s) Oral at bedtime    MEDICATIONS  (PRN):  acetaminophen     Tablet .. 650 milliGRAM(s) Oral every 6 hours PRN Temp greater or equal to 38C (100.4F), Mild Pain (1 - 3)  aluminum hydroxide/magnesium hydroxide/simethicone Suspension 30 milliLiter(s) Oral every 4 hours PRN Dyspepsia  HYDROmorphone  Injectable 0.5 milliGRAM(s) IV Push every 6 hours PRN Moderate Pain (4 - 6)  HYDROmorphone  Injectable 1 milliGRAM(s) IV Push every 6 hours PRN Severe Pain (7 - 10)  melatonin 3 milliGRAM(s) Oral at bedtime PRN Insomnia  ondansetron Injectable 4 milliGRAM(s) IV Push every 8 hours PRN Nausea and/or Vomiting      Vital Signs Last 24 Hrs  T(C): 36.7 (11 Oct 2023 05:00), Max: 36.9 (10 Oct 2023 21:33)  T(F): 98 (11 Oct 2023 05:00), Max: 98.5 (10 Oct 2023 21:33)  HR: 87 (11 Oct 2023 05:00) (69 - 87)  BP: 113/53 (11 Oct 2023 05:00) (105/57 - 143/74)  BP(mean): --  RR: 18 (11 Oct 2023 05:00) (18 - 18)  SpO2: 95% (11 Oct 2023 05:00) (95% - 97%)    Parameters below as of 11 Oct 2023 05:00  Patient On (Oxygen Delivery Method): room air        Constitutional: NAD  HEENT: PERRLA, EOMI, no drainage or redness  Cardiovascular: Regular rate & rhythm, normal S1, S2; no murmurs, gallops or rubs; no S3, S4  Gastrointestinal: Soft, non-tender, no hepatosplenomegaly, normal bowel sounds  Extremities: L UE wounds healing well, ulnar triphasic signals, no erythema or tenderness        I&O's Detail      LABS:                        9.8    9.80  )-----------( 108      ( 11 Oct 2023 06:07 )             31.2     10-11    136  |  103  |  18.7  ----------------------------<  89  4.1   |  22.0  |  1.48<H>    Ca    7.7<L>      11 Oct 2023 06:07  Mg     1.7     10-11        Urinalysis Basic - ( 11 Oct 2023 06:07 )    Color: x / Appearance: x / SG: x / pH: x  Gluc: 89 mg/dL / Ketone: x  / Bili: x / Urobili: x   Blood: x / Protein: x / Nitrite: x   Leuk Esterase: x / RBC: x / WBC x   Sq Epi: x / Non Sq Epi: x / Bacteria: x        RADIOLOGY & ADDITIONAL STUDIES:

## 2023-10-11 NOTE — PROGRESS NOTE ADULT - ASSESSMENT
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 Patient presented to [facility] for 2 weeks of inability to tolerate oral intake. Denies any fever or chills. Has chronic diarrhea due to bowel resection. In the ER patient was afebrile. Found to have leukocytosis to 23k. Patient was found to have SANTOS. Had a febrile episode to 102F on 9/25. Started on Vancomycin and Meropenem. Blood cultures 1 of 4 bottles with Staphylococcus epidermidis      Prosthetic MV endocarditis   Staphylococcus epidermidis bacteremia  Infected thrombosed Graft   s/p Left AV graft removal 10/1/23  Fever  Leukocytosis   SANTOS   PCN allergy   Ileus       - Blood cultures 9/23, 9/25 reporting Staphylococcus epidermidis  - Repeat blood cultures  9/27 no growth   - RVP/COVID 19 PCR 9/25 negative   - CT A/P 9/23 reporting no acute findings   - CXR with ileus on 10/3  - TTE reporting MV veg  - MUSTAPHA reporting large mobile density MV  - UA 9/25 negative for UTI   - PCN allergy but has tolerated Zosyn and Zinacef in the past.   - Vascular surgery following for thrombosed graft. s/p Left AV graft removal 10/1/23  - Continue Cefazolin   - Due to renal failure will hold off on Gentamicin  - Due to Eliquis unable to use Rifampin   - Plan for Repeat MUSTAPHA on or after 10/24/23  - Follow up cultures  - Trend Fever  - Trend WBC      Will Follow    Discussed treatment plan with: Dr Snider

## 2023-10-11 NOTE — PROGRESS NOTE ADULT - ASSESSMENT
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

## 2023-10-12 PROCEDURE — 36000 PLACE NEEDLE IN VEIN: CPT

## 2023-10-12 PROCEDURE — 99233 SBSQ HOSP IP/OBS HIGH 50: CPT

## 2023-10-12 RX ADMIN — AMLODIPINE BESYLATE 10 MILLIGRAM(S): 2.5 TABLET ORAL at 05:49

## 2023-10-12 RX ADMIN — APIXABAN 5 MILLIGRAM(S): 2.5 TABLET, FILM COATED ORAL at 05:49

## 2023-10-12 RX ADMIN — HYDROMORPHONE HYDROCHLORIDE 1 MILLIGRAM(S): 2 INJECTION INTRAMUSCULAR; INTRAVENOUS; SUBCUTANEOUS at 04:50

## 2023-10-12 RX ADMIN — HYDROMORPHONE HYDROCHLORIDE 1 MILLIGRAM(S): 2 INJECTION INTRAMUSCULAR; INTRAVENOUS; SUBCUTANEOUS at 05:37

## 2023-10-12 RX ADMIN — LEVETIRACETAM 400 MILLIGRAM(S): 250 TABLET, FILM COATED ORAL at 05:49

## 2023-10-12 RX ADMIN — CARVEDILOL PHOSPHATE 6.25 MILLIGRAM(S): 80 CAPSULE, EXTENDED RELEASE ORAL at 17:09

## 2023-10-12 RX ADMIN — Medication 2000 MILLIGRAM(S): at 11:19

## 2023-10-12 RX ADMIN — LEVETIRACETAM 400 MILLIGRAM(S): 250 TABLET, FILM COATED ORAL at 17:10

## 2023-10-12 RX ADMIN — CARVEDILOL PHOSPHATE 6.25 MILLIGRAM(S): 80 CAPSULE, EXTENDED RELEASE ORAL at 05:49

## 2023-10-12 RX ADMIN — APIXABAN 5 MILLIGRAM(S): 2.5 TABLET, FILM COATED ORAL at 17:09

## 2023-10-12 RX ADMIN — Medication 20 MILLIGRAM(S): at 17:10

## 2023-10-12 RX ADMIN — Medication 2000 MILLIGRAM(S): at 05:49

## 2023-10-12 RX ADMIN — Medication 650 MILLIGRAM(S): at 09:19

## 2023-10-12 RX ADMIN — HYDROMORPHONE HYDROCHLORIDE 1 MILLIGRAM(S): 2 INJECTION INTRAMUSCULAR; INTRAVENOUS; SUBCUTANEOUS at 11:24

## 2023-10-12 RX ADMIN — HYDROMORPHONE HYDROCHLORIDE 1 MILLIGRAM(S): 2 INJECTION INTRAMUSCULAR; INTRAVENOUS; SUBCUTANEOUS at 20:06

## 2023-10-12 RX ADMIN — Medication 2000 MILLIGRAM(S): at 23:58

## 2023-10-12 NOTE — PROGRESS NOTE ADULT - SUBJECTIVE AND OBJECTIVE BOX
NEPHROLOGY INTERVAL HPI/OVERNIGHT EVENTS:  pt clinically stable  no acute distress noted  uneventful overnight    MEDICATIONS  (STANDING):  amLODIPine   Tablet 10 milliGRAM(s) Oral daily  apixaban 5 milliGRAM(s) Oral every 12 hours  carvedilol 6.25 milliGRAM(s) Oral every 12 hours  ceFAZolin  Injectable. 2000 milliGRAM(s) IV Push every 8 hours  cloNIDine Patch 0.1 mG/24Hr(s) 1 patch Transdermal every 7 days  levETIRAcetam  IVPB 1000 milliGRAM(s) IV Intermittent every 12 hours  polyethylene glycol 3350 17 Gram(s) Oral daily  senna 2 Tablet(s) Oral at bedtime  tamsulosin 0.4 milliGRAM(s) Oral at bedtime    MEDICATIONS  (PRN):  acetaminophen     Tablet .. 650 milliGRAM(s) Oral every 6 hours PRN Temp greater or equal to 38C (100.4F), Mild Pain (1 - 3)  aluminum hydroxide/magnesium hydroxide/simethicone Suspension 30 milliLiter(s) Oral every 4 hours PRN Dyspepsia  HYDROmorphone  Injectable 0.5 milliGRAM(s) IV Push every 6 hours PRN Moderate Pain (4 - 6)  HYDROmorphone  Injectable 1 milliGRAM(s) IV Push every 6 hours PRN Severe Pain (7 - 10)  melatonin 3 milliGRAM(s) Oral at bedtime PRN Insomnia  ondansetron Injectable 4 milliGRAM(s) IV Push every 8 hours PRN Nausea and/or Vomiting      Allergies    penicillin (Other)        Vital Signs Last 24 Hrs  T(C): 36.8 (12 Oct 2023 09:21), Max: 36.9 (11 Oct 2023 15:26)  T(F): 98.2 (12 Oct 2023 09:21), Max: 98.4 (11 Oct 2023 15:26)  HR: 59 (12 Oct 2023 09:21) (59 - 67)  BP: 111/58 (12 Oct 2023 09:21) (97/58 - 144/55)  BP(mean): --  RR: 18 (12 Oct 2023 09:21) (18 - 18)  SpO2: 98% (12 Oct 2023 09:21) (92% - 98%)    Parameters below as of 12 Oct 2023 09:21  Patient On (Oxygen Delivery Method): room air        PHYSICAL EXAM:  GENERAL: Fatigued  NECK: Supple, no JVD  NERVOUS SYSTEM:  A/O x3  CHEST: Clear B/L   HEART:  RRR, No rub  ABDOMEN: Soft, NT/ND BS+  EXTREMITIES:  No dependent edema    LABS:                        9.8    9.80  )-----------( 108      ( 11 Oct 2023 06:07 )             31.2     10-11    136  |  103  |  18.7  ----------------------------<  89  4.1   |  22.0  |  1.48<H>    Ca    7.7<L>      11 Oct 2023 06:07  Mg     1.7     10-11        Urinalysis Basic - ( 11 Oct 2023 06:07 )    Color: x / Appearance: x / SG: x / pH: x  Gluc: 89 mg/dL / Ketone: x  / Bili: x / Urobili: x   Blood: x / Protein: x / Nitrite: x   Leuk Esterase: x / RBC: x / WBC x   Sq Epi: x / Non Sq Epi: x / Bacteria: x          RADIOLOGY & ADDITIONAL TESTS:  < from: CT Angio Abdomen w/ IV Cont (10.04.23 @ 12:04) >    ACC: [id number] EXAM:  CT ANGIO ABDOMEN ONLY (W)AW IC   ORDERED BY: ALIZE ALMANZAR     PROCEDURE DATE:  10/04/2023          INTERPRETATION:  CLINICAL INFORMATION: Right renal upper pole lesion.    COMPARISON: Contrast-enhanced CT of the abdomen and pelvis October 3,   2023, May 22, 2023, June 23, 2011CT angiogram December 13, 2022, June 25, 2021, pre and postcontrast CT of the abdomen and pelvis March 12, 2021.    CONTRAST/COMPLICATIONS:  IV Contrast: Omnipaque 350  94 cc administered   6 cc discarded  Oral Contrast: NONE  Complications: None reported at time of study completion    PROCEDURE:  CT of the Abdomen and Pelvis was performed.  Precontrast, Arterial and Delayed phases were acquired.  Sagittal and coronal reformats were performed.    FINDINGS:  LOWER CHEST: Again are noted peribronchovascular nodular and confluent   opacities at the lung bases. Mitral valve replacement.    LIVER: Within normal limits.  BILE DUCTS: Normal caliber.  GALLBLADDER: Layering gallstone. Vicarious excretion of contrast.  SPLEEN: Within normal limits.  PANCREAS: Within normal limits. Incidental pancreas ductus divisum.  ADRENALS: Within normal limits.  KIDNEYS/URETERS: Again is noted a heterogeneously hyperenhancing 1.9 x   1.4 cm lesion withwashout in the posterior upper pole of the right   kidney at site of the previous thinly septated cystic lesion in June 2011. There are bilateral simple renal cysts. Again is noted a   wedge-shaped region of delayed enhancement in the lateral midpole of the   left kidney, compatible with subacute infarct.    Otherwise, the kidneys are symmetric in appearance and enhancement with   no evidence of hydronephrosis, stone, or perinephric stranding   bilaterally. The renal veins are patent. There is no paracaval or   para-aortic adenopathy.    BLADDER: Within normal limits.  REPRODUCTIVE ORGANS: The prostate is not enlarged.    BOWEL: Rectosigmoid resection with reanastomosis. Small bowel resection   with reanastomosis. Again are noted fluid-filled patulous loops of small   and large bowel. No bowel obstruction. There is no mesenteric adenopathy.  PERITONEUM: No ascites.  VESSELS: Again is noted a dissection of the descending thoracic aorta and   abdominal aorta extending to the right external iliac artery and into the   bilateral renal arteries.  RETROPERITONEUM/LYMPH NODES: No lymphadenopathy. Shotty aortocaval lymph   nodes.  ABDOMINAL WALL: Within normal limits.  BONES: Posterior spondylitic ridging at L4-L5.    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 No evidence of   adenopathy.    < end of copied text >

## 2023-10-12 NOTE — PROGRESS NOTE ADULT - ASSESSMENT
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 Patient presented to [facility] for 2 weeks of inability to tolerate oral intake. Denies any fever or chills. Has chronic diarrhea due to bowel resection. In the ER patient was afebrile. Found to have leukocytosis to 23k. Patient was found to have SANTOS. Had a febrile episode to 102F on 9/25. Started on Vancomycin and Meropenem. Blood cultures 1 of 4 bottles with Staphylococcus epidermidis      Prosthetic MV endocarditis   Staphylococcus epidermidis bacteremia  Infected thrombosed Graft   s/p Left AV graft removal 10/1/23  Fever  Leukocytosis   SANTOS   PCN allergy   Ileus       - Blood cultures 9/23, 9/25 reporting Staphylococcus epidermidis  - Repeat blood cultures  9/27 no growth   - RVP/COVID 19 PCR 9/25 negative   - CT A/P 9/23 reporting no acute findings   - CXR with ileus on 10/3  - TTE reporting MV veg  - MUSTAPHA reporting large mobile density MV  - UA 9/25 negative for UTI   - PCN allergy but has tolerated Zosyn and Zinacef in the past.   - Vascular surgery following for thrombosed graft. s/p Left AV graft removal 10/1/23  - Continue Cefazolin   - Due to renal failure will hold off on Gentamicin  - Due to Eliquis unable to use Rifampin   - Plan for Repeat MUSTAPHA on or after 10/24/23, will reach out to cardiology   - Tentative end date for Cefazolin will be 11/8/23  - Will plan on midline prior to D/C  - Will need weekly CBC and CMP faxed to [phone]  - Appointment with us in 7 to 10 days after D/C - [phone]  - Follow up cultures  - Trend Fever  - Trend WBC      Will Follow    Discussed treatment plan with: Dr Snider

## 2023-10-12 NOTE — PROGRESS NOTE ADULT - SUBJECTIVE AND OBJECTIVE BOX
Patient is a 63y old  Male who presents with a chief complaint of Intractable N/V, SANTOS (07 Oct 2023 05:47)      INTERVAL HPI/OVERNIGHT EVENTS: seen and examined. No complains. Reports doing ok and asking about discharge home       MEDICATIONS  (STANDING):  amLODIPine   Tablet 10 milliGRAM(s) Oral daily  atorvastatin 40 milliGRAM(s) Oral at bedtime  ceFAZolin  Injectable. 2000 milliGRAM(s) IV Push every 8 hours  dextrose 5% + sodium chloride 0.45% with potassium chloride 10 mEq/L 1000 milliLiter(s) (75 mL/Hr) IV Continuous <Continuous>  heparin   Injectable 5000 Unit(s) SubCutaneous every 8 hours  levETIRAcetam  IVPB 1000 milliGRAM(s) IV Intermittent every 12 hours  polyethylene glycol 3350 17 Gram(s) Oral daily  senna 2 Tablet(s) Oral at bedtime  tamsulosin 0.4 milliGRAM(s) Oral at bedtime    MEDICATIONS  (PRN):  acetaminophen     Tablet .. 650 milliGRAM(s) Oral every 6 hours PRN Temp greater or equal to 38C (100.4F), Mild Pain (1 - 3)  aluminum hydroxide/magnesium hydroxide/simethicone Suspension 30 milliLiter(s) Oral every 4 hours PRN Dyspepsia  hydrALAZINE Injectable 10 milliGRAM(s) IV Push every 6 hours PRN SBP>170  HYDROmorphone  Injectable 0.5 milliGRAM(s) IV Push every 6 hours PRN Moderate Pain (4 - 6)  HYDROmorphone  Injectable 1 milliGRAM(s) IV Push every 4 hours PRN breakthru pain  or prior o dressing changes  HYDROmorphone  Injectable 1 milliGRAM(s) IV Push every 6 hours PRN Severe Pain (7 - 10)  melatonin 3 milliGRAM(s) Oral at bedtime PRN Insomnia  ondansetron Injectable 4 milliGRAM(s) IV Push every 8 hours PRN Nausea and/or Vomiting      Allergies    penicillin (Other)    Intolerances        REVIEW OF SYSTEMS:  CONSTITUTIONAL: No fever, weight loss, or fatigue  RESPIRATORY: No cough, wheezing, chills or hemoptysis; No shortness of breath  CARDIOVASCULAR: No chest pain, palpitations, dizziness, or leg swelling  GASTROINTESTINAL: No abdominal or epigastric pain. No nausea, vomiting, or hematemesis; No diarrhea or constipation. No melena or hematochezia.  NEUROLOGICAL: No headaches, memory loss, loss of strength, numbness, or tremors  MUSCULOSKELETAL: No joint pain or swelling; No muscle, back, or extremity pain      Vital Signs Last 24 Hrs  T(C): 36.7 (07 Oct 2023 08:22), Max: 36.8 (07 Oct 2023 00:00)  T(F): 98 (07 Oct 2023 08:22), Max: 98.2 (07 Oct 2023 00:00)  HR: 62 (07 Oct 2023 08:22) (60 - 75)  BP: 180/82 (07 Oct 2023 08:22) (147/85 - 184/76)  BP(mean): --  RR: 18 (07 Oct 2023 08:22) (18 - 18)  SpO2: 95% (07 Oct 2023 08:22) (92% - 97%)    Parameters below as of 07 Oct 2023 08:22  Patient On (Oxygen Delivery Method): room air        PHYSICAL EXAM:  GENERAL: NAD, laying in bed   HEAD:  Atraumatic, Normocephalic  EYES: EOMI, PERRLA, conjunctiva and sclera clear  NECK: Supple, No JVD,  NERVOUS SYSTEM:  Alert & Oriented X3, No gross focal deficits  CHEST/LUNG: Clear to percussion bilaterally; No rales, rhonchi, wheezing, or rubs  HEART: Regular rate and rhythm; No murmurs, rubs, or gallops  ABDOMEN: Soft, Nontender, Nondistended; Bowel sounds present, large midabdominal scar   EXTREMITIES:  No clubbing, cyanosis, or edema  SKIN: No rashes or lesions    LABS:                        10.2   8.12  )-----------( 163      ( 07 Oct 2023 07:48 )             34.2     10-07    148<H>  |  110<H>  |  29.1<H>  ----------------------------<  113<H>  3.6   |  27.0  |  1.58<H>    Ca    8.4      07 Oct 2023 07:48  Phos  2.4     10-07  Mg     2.5     10-07    TPro  6.9  /  Alb  2.6<L>  /  TBili  0.6  /  DBili  x   /  AST  22  /  ALT  <5  /  AlkPhos  111  10-06      Urinalysis Basic - ( 07 Oct 2023 07:48 )    Color: x / Appearance: x / SG: x / pH: x  Gluc: 113 mg/dL / Ketone: x  / Bili: x / Urobili: x   Blood: x / Protein: x / Nitrite: x   Leuk Esterase: x / RBC: x / WBC x   Sq Epi: x / Non Sq Epi: x / Bacteria: x      CAPILLARY BLOOD GLUCOSE          RADIOLOGY & ADDITIONAL TESTS:    Imaging Personally Reviewed:  [ x] YES  [ ] NO    Consultant(s) Notes Reviewed:  [x ] YES  [ ] NO    Care Discussed with Consultants/Other Providers [ ] YES  [ ] NO    Plan of Care discussed with Housestaff [ x]YES [ ] NO

## 2023-10-12 NOTE — PROGRESS NOTE ADULT - ASSESSMENT
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  Labs consistent w/ SANTOS on CKD.  Hospital course complicated by bacteremia w/ cxs growing staph epidermidus and TTE showing large MV endocarditis. Vascular consulted with concern of Infected prosthetic vascular graft,  Patient is now s/p removal of L AV graft. Was monitor in SICU and then transfer to floor. Vascular requesting transfer back to medicine service. Patient hospital course now complicated by Ileus vs SBO. General surgery was consulted. NGT was placed and now removed.     Sepsis POA 2/2 Staphylococcus epidermidis bacteremia likely endovascular in origin w/ subsequent MV IE   Afebrile, WBC normalized   - Repeat BCXs from 09/27 NGTD  - LUE AV graft concerning for infectious source of bacteremia leading to IE now s/p removal on 10/1 by vascular  - s/p MUSTAPHA w/ evidence of large MV vegetation   - c/w cefazolin as per ID recs   - CT A/P re demonstrates aortic dissection stable from 05/2023 but no acute findings reported   - CTSx consulted for consideration of re-operation of MVR   - As per CTSx will hold off on surgery for now and on contrast studies given pt high surgical risk and risk of progressing to HD again   Plan for repeat MUSTAPHA as outpatient   Will order midline and antibiotics at home until 11/8  Discussed with dr. Quigley     Ileus vs SBO -resolved   Tolerating regular diet       SANTOS on CKD III likely prerenal azotemia 2/2 GI losses and poor po intake - improved   - Cr improved  since admission (baseline 1.5-1.8),   - Avoid nephrotoxic meds or renally dose if needed   Will resume home dose of Torsemide       Chronic HFpEF  - c/w Coreg  - - MUSTAPHA w/ LVEF 55-60%  - Monitor daily weights and I/O's  - Cardio following and recs noted  Will resume torsemide     Paroxysmal Atrial fibrillation   - Rate controlled currently   - CHADSVASC of 5 and as per cardiology no need to bridge   c/w Eliquis since 10/10      HTN - BP better controlled   c/w  amlodipine and Carvedilol   dc Labetalol IV         Hx of SDH   - CTH has shown complete resolution since July 2023    Mitral/Aortic valve replacement   - Recent MUSTAPHA shows bioprosthetic valves with only moderate paravalvular leak in MV  - Now w/ large vegetation on MV  - CTSx following but no plan for surgical  intervention at this time      VTE ppx: HSQ  Dispo: Discharge planning

## 2023-10-12 NOTE — PROGRESS NOTE ADULT - ASSESSMENT
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

## 2023-10-12 NOTE — PROGRESS NOTE ADULT - SUBJECTIVE AND OBJECTIVE BOX
[facility]  INFECTIOUS DISEASES at [place] / [place] / [place]  =======================================================                               Roosevelt Victor MD#   Fabián Quigley MD*                             Valentine Handley MD*   Reba Catalan MD*                              Professor Emeritus:  Dr Johnny Hathaway MD^            Diplomates American Board of Internal Medicine & Infectious Diseases                # [place] Office - Appt - Tel  [phone] Fax [phone]                * [place] Office - Appt - Tel [phone] Fax [phone]                      ^[place] Office - Tel  [phone] Fax [phone]                                  Hospital Consult line:  [phone]  =======================================================    JONATHAN GIBSON [id number]    Follow up: MV Endocarditis     s/p Left AV graft removal 10/1/23    no fevers   Tolerating liquid diet     Allergies:  penicillin (Other)       REVIEW OF SYSTEMS:  CONSTITUTIONAL:  No Fever or chills  HEENT:  No diplopia or blurred vision.  No earache, sore throat or runny nose.  CARDIOVASCULAR:  No chest pain  RESPIRATORY:  No cough, shortness of breath  GASTROINTESTINAL:  No nausea or vomiting.  GENITOURINARY:  No dysuria, frequency or urgency. No Blood in urine  MUSCULOSKELETAL:  no joint aches, no muscle pain  SKIN:  No change in skin, hair or nails.  NEUROLOGIC:  No Headaches      Physical Exam:  GEN: NAD  HEENT: normocephalic and atraumatic. EOMI. PERRL.    NECK: Supple.   LUNGS: CTA B/L.  HEART: RRR  ABDOMEN: Soft, NT.  +BS.  Non-distended   : No CVA tenderness  EXTREMITIES: Without  edema.  MSK: No joint swelling  NEUROLOGIC: No Focal Deficits       Vitals:  T(F): 98.4 (12 Oct 2023 05:00), Max: 98.4 (11 Oct 2023 15:26)  HR: 61 (12 Oct 2023 05:00)  BP: 144/55 (12 Oct 2023 05:00)  RR: 18 (12 Oct 2023 05:00)  SpO2: 94% (12 Oct 2023 05:00) (92% - 95%)  temp max in last 48H T(F): , Max: 98.5 (10-10-23 @ 21:33)      Current Antibiotics:  ceFAZolin  Injectable. 2000 milliGRAM(s) IV Push every 8 hours    Other medications:  amLODIPine   Tablet 10 milliGRAM(s) Oral daily  apixaban 5 milliGRAM(s) Oral every 12 hours  carvedilol 6.25 milliGRAM(s) Oral every 12 hours  cloNIDine Patch 0.1 mG/24Hr(s) 1 patch Transdermal every 7 days  levETIRAcetam  IVPB 1000 milliGRAM(s) IV Intermittent every 12 hours  polyethylene glycol 3350 17 Gram(s) Oral daily  senna 2 Tablet(s) Oral at bedtime  tamsulosin 0.4 milliGRAM(s) Oral at bedtime                   9.8    9.80  )-----------( 108      ( 11 Oct 2023 06:07 )             31.2     10-11    136  |  103  |  18.7  ----------------------------<  89  4.1   |  22.0  |  1.48<H>    Ca    7.7<L>      11 Oct 2023 06:07  Mg     1.7     10-11      RECENT CULTURES:  10-01 @ 12:00 .Abscess arm abscess     No growth at 5 days  Upon re-evaluation of gram stain:  No organisms seen per oil power field  Moderate polymorphonuclear leukocytes seen per low power field  Previously reported as:  Rare Gram positive cocci in pairs seen per oil power field  Rare Gram Negative Rods seen per oil power field      WBC Count: 9.80 K/uL (10-11-23 @ 06:07)  WBC Count: 9.97 K/uL (10-10-23 @ 07:50)  WBC Count: 11.85 K/uL (10-09-23 @ 05:42)  WBC Count: 10.12 K/uL (10-08-23 @ 04:10)  WBC Count: 8.12 K/uL (10-07-23 @ 07:48)    Creatinine: 1.48 mg/dL (10-11-23 @ 06:07)  Creatinine: 1.28 mg/dL (10-10-23 @ 07:50)  Creatinine: 1.38 mg/dL (10-09-23 @ 05:42)  Creatinine: 1.50 mg/dL (10-08-23 @ 04:10)  Creatinine: 1.58 mg/dL (10-07-23 @ 07:48)    SARS-CoV-2: NotDetec (09-25-23 @ 06:00)          < from: MUSTAPHA Echo Doppler (09.27.23 @ 10:59) >  Summary:   1. Left ventricular ejection fraction, by visual estimation, is 55 to 60%.   2. Severely enlarged left atrium.   3. Mild mitral valve regurgitation.   4. Mitral prosthesis vegetation.   5. There is a bioprosthetic valve in the mitral position with thickened   leaflets yet leaflet mobility is fairly preserved. There is trace to mild   valvular regurgitation with no perivalvular leaks. No rocking motion or   dehiscence noted. There is a large and mobile vegetation measuring   1.8x1.0 cm attached to the atrial surface of the medial bioprosthetic   leaflet. There is evidence of at least mild valve stenosis with a mean   transvalvular gradient of 5 mmHg at a heart rate of 60 bpm. The mitral   valve area by 3D is 1.57 cm2.   6. Mild tricuspid regurgitation.   7. Bioprosthesis in the aortic position.   8. Dilatation of the aortic root.    < end of copied text >        < from: TTE Echo Complete w/o Contrast w/ Doppler (09.26.23 @ 14:32) >  Summary:   1. Left ventricular ejection fraction, by visual estimation, is 50 to   55%.   2. Normal global left ventricular systolic function.   3. Diastolic function indeterminate.   4. Abnormal septal motion consistent with post-operative status.   5. Normal RV size with mildly reduced RV systolic function, inadequate   estimation of RVSP.   6. Bioprosthetic mitral valve present with a large mobile lesion (at   least 2.0 cm x 1.5 cm) prolapsing into and out of the LV-LA cavity,   hightly suggestive of vegetation. Mean transmitral valve gradient 4.8   mmHg (at HR of 60 bpm).   7. Bioprosthesis in the aortic position, mean gradient of 9 mmHg.   8. There is no evidence of pericardial effusion.   9. Compared to the prior TTE study from 5/23/23, lesion over bioMVR is   again noted and is now much larger and recommend MUSTAPHA study to confirm   vegetation and exclude other valvular involvement.  < end of copied text >

## 2023-10-13 PROCEDURE — 99233 SBSQ HOSP IP/OBS HIGH 50: CPT

## 2023-10-13 PROCEDURE — 99232 SBSQ HOSP IP/OBS MODERATE 35: CPT

## 2023-10-13 PROCEDURE — 76937 US GUIDE VASCULAR ACCESS: CPT | Mod: 26,59

## 2023-10-13 PROCEDURE — 36569 INSJ PICC 5 YR+ W/O IMAGING: CPT

## 2023-10-13 RX ORDER — HYDROMORPHONE HYDROCHLORIDE 2 MG/ML
2 INJECTION INTRAMUSCULAR; INTRAVENOUS; SUBCUTANEOUS EVERY 6 HOURS
Refills: 0 | Status: DISCONTINUED | OUTPATIENT
Start: 2023-10-13 | End: 2023-10-16

## 2023-10-13 RX ORDER — LEVETIRACETAM 250 MG/1
1000 TABLET, FILM COATED ORAL
Refills: 0 | Status: DISCONTINUED | OUTPATIENT
Start: 2023-10-13 | End: 2023-10-16

## 2023-10-13 RX ADMIN — Medication 2000 MILLIGRAM(S): at 17:03

## 2023-10-13 RX ADMIN — HYDROMORPHONE HYDROCHLORIDE 2 MILLIGRAM(S): 2 INJECTION INTRAMUSCULAR; INTRAVENOUS; SUBCUTANEOUS at 17:06

## 2023-10-13 RX ADMIN — APIXABAN 5 MILLIGRAM(S): 2.5 TABLET, FILM COATED ORAL at 17:03

## 2023-10-13 RX ADMIN — CARVEDILOL PHOSPHATE 6.25 MILLIGRAM(S): 80 CAPSULE, EXTENDED RELEASE ORAL at 05:52

## 2023-10-13 RX ADMIN — AMLODIPINE BESYLATE 10 MILLIGRAM(S): 2.5 TABLET ORAL at 05:52

## 2023-10-13 RX ADMIN — Medication 2000 MILLIGRAM(S): at 09:37

## 2023-10-13 RX ADMIN — LEVETIRACETAM 1000 MILLIGRAM(S): 250 TABLET, FILM COATED ORAL at 17:03

## 2023-10-13 RX ADMIN — HYDROMORPHONE HYDROCHLORIDE 1 MILLIGRAM(S): 2 INJECTION INTRAMUSCULAR; INTRAVENOUS; SUBCUTANEOUS at 10:12

## 2023-10-13 RX ADMIN — HYDROMORPHONE HYDROCHLORIDE 2 MILLIGRAM(S): 2 INJECTION INTRAMUSCULAR; INTRAVENOUS; SUBCUTANEOUS at 23:18

## 2023-10-13 RX ADMIN — HYDROMORPHONE HYDROCHLORIDE 1 MILLIGRAM(S): 2 INJECTION INTRAMUSCULAR; INTRAVENOUS; SUBCUTANEOUS at 11:12

## 2023-10-13 RX ADMIN — CARVEDILOL PHOSPHATE 6.25 MILLIGRAM(S): 80 CAPSULE, EXTENDED RELEASE ORAL at 17:03

## 2023-10-13 RX ADMIN — HYDROMORPHONE HYDROCHLORIDE 2 MILLIGRAM(S): 2 INJECTION INTRAMUSCULAR; INTRAVENOUS; SUBCUTANEOUS at 16:06

## 2023-10-13 RX ADMIN — APIXABAN 5 MILLIGRAM(S): 2.5 TABLET, FILM COATED ORAL at 05:52

## 2023-10-13 RX ADMIN — TAMSULOSIN HYDROCHLORIDE 0.4 MILLIGRAM(S): 0.4 CAPSULE ORAL at 22:18

## 2023-10-13 RX ADMIN — HYDROMORPHONE HYDROCHLORIDE 2 MILLIGRAM(S): 2 INJECTION INTRAMUSCULAR; INTRAVENOUS; SUBCUTANEOUS at 22:18

## 2023-10-13 RX ADMIN — HYDROMORPHONE HYDROCHLORIDE 1 MILLIGRAM(S): 2 INJECTION INTRAMUSCULAR; INTRAVENOUS; SUBCUTANEOUS at 21:06

## 2023-10-13 RX ADMIN — Medication 650 MILLIGRAM(S): at 00:35

## 2023-10-13 RX ADMIN — Medication 20 MILLIGRAM(S): at 17:03

## 2023-10-13 RX ADMIN — LEVETIRACETAM 400 MILLIGRAM(S): 250 TABLET, FILM COATED ORAL at 05:51

## 2023-10-13 NOTE — PROGRESS NOTE ADULT - ASSESSMENT
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

## 2023-10-13 NOTE — PROCEDURE NOTE - NSPROCDETAILS_GEN_ALL_CORE
location identified, draped/prepped, sterile technique used/sterile dressing applied/sterile technique, catheter placed/supine position/ultrasound guidance
blood seen on insertion/dressing applied/flushes easily/secured in place

## 2023-10-13 NOTE — PROGRESS NOTE ADULT - ASSESSMENT
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 Patient presented to [facility] for 2 weeks of inability to tolerate oral intake. Denies any fever or chills. Has chronic diarrhea due to bowel resection. In the ER patient was afebrile. Found to have leukocytosis to 23k. Patient was found to have SANTOS. Had a febrile episode to 102F on 9/25. Started on Vancomycin and Meropenem. Blood cultures 1 of 4 bottles with Staphylococcus epidermidis      Prosthetic MV endocarditis   Staphylococcus epidermidis bacteremia  Infected thrombosed Graft   s/p Left AV graft removal 10/1/23  Fever  Leukocytosis   SANTOS   PCN allergy   Ileus       - Blood cultures 9/23, 9/25 reporting Staphylococcus epidermidis  - Repeat blood cultures  9/27 no growth   - RVP/COVID 19 PCR 9/25 negative   - CT A/P 9/23 reporting no acute findings   - CXR with ileus on 10/3  - TTE reporting MV veg  - MUSTAPHA reporting large mobile density MV  - UA 9/25 negative for UTI   - PCN allergy but has tolerated Zosyn and Zinacef in the past.   - Vascular surgery following for thrombosed graft. s/p Left AV graft removal 10/1/23  - Continue Cefazolin   - Due to renal failure will hold off on Gentamicin  - Due to Eliquis unable to use Rifampin   - Plan for Repeat MUSTAPHA on or after 10/24/23, will reach out to cardiology   - Tentative end date for Cefazolin will be 11/8/23  - Discussed and explained midline procedure to the patient in detail, agreeable to midline   - Will need weekly CBC and CMP faxed to [phone]  - Appointment with us in 7 to 10 days after D/C - [phone]  - Follow up cultures  - Trend Fever  - Trend WBC      Will Follow    Discussed treatment plan with: Dr Snider

## 2023-10-13 NOTE — PROGRESS NOTE ADULT - SUBJECTIVE AND OBJECTIVE BOX
Patient is a 63y old  Male who presents with a chief complaint of Intractable N/V, SANTOS (07 Oct 2023 05:47)      INTERVAL HPI/OVERNIGHT EVENTS: seen and examined. No complains.     MEDICATIONS  (STANDING):  amLODIPine   Tablet 10 milliGRAM(s) Oral daily  atorvastatin 40 milliGRAM(s) Oral at bedtime  ceFAZolin  Injectable. 2000 milliGRAM(s) IV Push every 8 hours  dextrose 5% + sodium chloride 0.45% with potassium chloride 10 mEq/L 1000 milliLiter(s) (75 mL/Hr) IV Continuous <Continuous>  heparin   Injectable 5000 Unit(s) SubCutaneous every 8 hours  levETIRAcetam  IVPB 1000 milliGRAM(s) IV Intermittent every 12 hours  polyethylene glycol 3350 17 Gram(s) Oral daily  senna 2 Tablet(s) Oral at bedtime  tamsulosin 0.4 milliGRAM(s) Oral at bedtime    MEDICATIONS  (PRN):  acetaminophen     Tablet .. 650 milliGRAM(s) Oral every 6 hours PRN Temp greater or equal to 38C (100.4F), Mild Pain (1 - 3)  aluminum hydroxide/magnesium hydroxide/simethicone Suspension 30 milliLiter(s) Oral every 4 hours PRN Dyspepsia  hydrALAZINE Injectable 10 milliGRAM(s) IV Push every 6 hours PRN SBP>170  HYDROmorphone  Injectable 0.5 milliGRAM(s) IV Push every 6 hours PRN Moderate Pain (4 - 6)  HYDROmorphone  Injectable 1 milliGRAM(s) IV Push every 4 hours PRN breakthru pain  or prior o dressing changes  HYDROmorphone  Injectable 1 milliGRAM(s) IV Push every 6 hours PRN Severe Pain (7 - 10)  melatonin 3 milliGRAM(s) Oral at bedtime PRN Insomnia  ondansetron Injectable 4 milliGRAM(s) IV Push every 8 hours PRN Nausea and/or Vomiting      Allergies    penicillin (Other)    Intolerances        REVIEW OF SYSTEMS:  CONSTITUTIONAL: No fever, weight loss, or fatigue  RESPIRATORY: No cough, wheezing, chills or hemoptysis; No shortness of breath  CARDIOVASCULAR: No chest pain, palpitations, dizziness, or leg swelling  GASTROINTESTINAL: No abdominal or epigastric pain. No nausea, vomiting, or hematemesis; No diarrhea or constipation. No melena or hematochezia.  NEUROLOGICAL: No headaches, memory loss, loss of strength, numbness, or tremors  MUSCULOSKELETAL: No joint pain or swelling; No muscle, back, or extremity pain      Vital Signs Last 24 Hrs  T(C): 36.7 (07 Oct 2023 08:22), Max: 36.8 (07 Oct 2023 00:00)  T(F): 98 (07 Oct 2023 08:22), Max: 98.2 (07 Oct 2023 00:00)  HR: 62 (07 Oct 2023 08:22) (60 - 75)  BP: 180/82 (07 Oct 2023 08:22) (147/85 - 184/76)  BP(mean): --  RR: 18 (07 Oct 2023 08:22) (18 - 18)  SpO2: 95% (07 Oct 2023 08:22) (92% - 97%)    Parameters below as of 07 Oct 2023 08:22  Patient On (Oxygen Delivery Method): room air        PHYSICAL EXAM:  GENERAL: NAD, laying in bed , chronically ill   HEAD:  Atraumatic, Normocephalic  EYES: EOMI, PERRLA, conjunctiva and sclera clear  NECK: Supple, No JVD,  NERVOUS SYSTEM:  Alert & Oriented X3, No gross focal deficits  CHEST/LUNG: Clear to percussion bilaterally; No rales, rhonchi, wheezing, or rubs  HEART: Regular rate and rhythm; No murmurs, rubs, or gallops  ABDOMEN: Soft, Nontender, Nondistended; Bowel sounds present, large midabdominal scar   EXTREMITIES:  No clubbing, cyanosis, or edema  SKIN: No rashes or lesions    LABS:                        10.2   8.12  )-----------( 163      ( 07 Oct 2023 07:48 )             34.2     10-07    148<H>  |  110<H>  |  29.1<H>  ----------------------------<  113<H>  3.6   |  27.0  |  1.58<H>    Ca    8.4      07 Oct 2023 07:48  Phos  2.4     10-07  Mg     2.5     10-07    TPro  6.9  /  Alb  2.6<L>  /  TBili  0.6  /  DBili  x   /  AST  22  /  ALT  <5  /  AlkPhos  111  10-06      Urinalysis Basic - ( 07 Oct 2023 07:48 )    Color: x / Appearance: x / SG: x / pH: x  Gluc: 113 mg/dL / Ketone: x  / Bili: x / Urobili: x   Blood: x / Protein: x / Nitrite: x   Leuk Esterase: x / RBC: x / WBC x   Sq Epi: x / Non Sq Epi: x / Bacteria: x      CAPILLARY BLOOD GLUCOSE          RADIOLOGY & ADDITIONAL TESTS:    Imaging Personally Reviewed:  [ x] YES  [ ] NO    Consultant(s) Notes Reviewed:  [x ] YES  [ ] NO    Care Discussed with Consultants/Other Providers [ ] YES  [ ] NO    Plan of Care discussed with Housestaff [ x]YES [ ] NO

## 2023-10-13 NOTE — PROGRESS NOTE ADULT - SUBJECTIVE AND OBJECTIVE BOX
[facility]  INFECTIOUS DISEASES at [place] / [place] / [place]  =======================================================                               Roosevelt Victor MD#   Fabián Quigley MD*                             Valentine Handley MD*   Reba Catalan MD*                              Professor Emeritus:  Dr Johnny Hathaway MD^            Diplomates American Board of Internal Medicine & Infectious Diseases                # [place] Office - Appt - Tel  [phone] Fax [phone]                * [place] Office - Appt - Tel [phone] Fax [phone]                      ^[place] Office - Tel  [phone] Fax [phone]                                  Hospital Consult line:  [phone]  =======================================================    JONATHAN GIBSON [id number]    Follow up: MV Endocarditis     s/p Left AV graft removal 10/1/23    no fevers   Tolerating liquid diet     Allergies:  penicillin (Other)       REVIEW OF SYSTEMS:  CONSTITUTIONAL:  No Fever or chills  HEENT:  No diplopia or blurred vision.  No earache, sore throat or runny nose.  CARDIOVASCULAR:  No chest pain  RESPIRATORY:  No cough, shortness of breath  GASTROINTESTINAL:  No nausea or vomiting.  GENITOURINARY:  No dysuria, frequency or urgency. No Blood in urine  MUSCULOSKELETAL:  no joint aches, no muscle pain  SKIN:  No change in skin, hair or nails.  NEUROLOGIC:  No Headaches      Physical Exam:  GEN: NAD  HEENT: normocephalic and atraumatic. EOMI. PERRL.    NECK: Supple.   LUNGS: CTA B/L.  HEART: RRR  ABDOMEN: Soft, NT.  +BS.  Non-distended   : No CVA tenderness  EXTREMITIES: Without  edema.  MSK: No joint swelling  NEUROLOGIC: No Focal Deficits       Vitals:  T(F): 97.9 (13 Oct 2023 09:13), Max: 98.3 (12 Oct 2023 17:17)  HR: 67 (13 Oct 2023 09:13)  BP: 119/71 (13 Oct 2023 09:13)  RR: 18 (13 Oct 2023 09:13)  SpO2: 95% (13 Oct 2023 09:13) (95% - 98%)  temp max in last 48H T(F): , Max: 98.4 (10-11-23 @ 15:26)    Current Antibiotics:  ceFAZolin  Injectable. 2000 milliGRAM(s) IV Push every 8 hours    Other medications:  amLODIPine   Tablet 10 milliGRAM(s) Oral daily  apixaban 5 milliGRAM(s) Oral every 12 hours  carvedilol 6.25 milliGRAM(s) Oral every 12 hours  cloNIDine Patch 0.1 mG/24Hr(s) 1 patch Transdermal every 7 days  levETIRAcetam  IVPB 1000 milliGRAM(s) IV Intermittent every 12 hours  polyethylene glycol 3350 17 Gram(s) Oral daily  senna 2 Tablet(s) Oral at bedtime  tamsulosin 0.4 milliGRAM(s) Oral at bedtime  torsemide 20 milliGRAM(s) Oral daily        RECENT CULTURES:  10-01 @ 12:00 .Abscess arm abscess     No growth at 5 days  Upon re-evaluation of gram stain:  No organisms seen per oil power field  Moderate polymorphonuclear leukocytes seen per low power field  Previously reported as:  Rare Gram positive cocci in pairs seen per oil power field  Rare Gram Negative Rods seen per oil power field          WBC Count: 9.80 K/uL (10-11-23 @ 06:07)  WBC Count: 9.97 K/uL (10-10-23 @ 07:50)  WBC Count: 11.85 K/uL (10-09-23 @ 05:42)    Creatinine: 1.48 mg/dL (10-11-23 @ 06:07)  Creatinine: 1.28 mg/dL (10-10-23 @ 07:50)  Creatinine: 1.38 mg/dL (10-09-23 @ 05:42)       SARS-CoV-2: NotDetec (09-25-23 @ 06:00)            < from: MUSTAPHA Echo Doppler (09.27.23 @ 10:59) >  Summary:   1. Left ventricular ejection fraction, by visual estimation, is 55 to 60%.   2. Severely enlarged left atrium.   3. Mild mitral valve regurgitation.   4. Mitral prosthesis vegetation.   5. There is a bioprosthetic valve in the mitral position with thickened   leaflets yet leaflet mobility is fairly preserved. There is trace to mild   valvular regurgitation with no perivalvular leaks. No rocking motion or   dehiscence noted. There is a large and mobile vegetation measuring   1.8x1.0 cm attached to the atrial surface of the medial bioprosthetic   leaflet. There is evidence of at least mild valve stenosis with a mean   transvalvular gradient of 5 mmHg at a heart rate of 60 bpm. The mitral   valve area by 3D is 1.57 cm2.   6. Mild tricuspid regurgitation.   7. Bioprosthesis in the aortic position.   8. Dilatation of the aortic root.    < end of copied text >        < from: TTE Echo Complete w/o Contrast w/ Doppler (09.26.23 @ 14:32) >  Summary:   1. Left ventricular ejection fraction, by visual estimation, is 50 to   55%.   2. Normal global left ventricular systolic function.   3. Diastolic function indeterminate.   4. Abnormal septal motion consistent with post-operative status.   5. Normal RV size with mildly reduced RV systolic function, inadequate   estimation of RVSP.   6. Bioprosthetic mitral valve present with a large mobile lesion (at   least 2.0 cm x 1.5 cm) prolapsing into and out of the LV-LA cavity,   hightly suggestive of vegetation. Mean transmitral valve gradient 4.8   mmHg (at HR of 60 bpm).   7. Bioprosthesis in the aortic position, mean gradient of 9 mmHg.   8. There is no evidence of pericardial effusion.   9. Compared to the prior TTE study from 5/23/23, lesion over bioMVR is   again noted and is now much larger and recommend MUSTAPHA study to confirm   vegetation and exclude other valvular involvement.  < end of copied text >

## 2023-10-13 NOTE — PROGRESS NOTE ADULT - ASSESSMENT
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  Labs consistent w/ SANTOS on CKD.  Hospital course complicated by bacteremia w/ cxs growing staph epidermidus and TTE showing large MV endocarditis. Vascular consulted with concern of Infected prosthetic vascular graft,  Patient is now s/p removal of L AV graft. Was monitor in SICU and then transfer to floor. Vascular requesting transfer back to medicine service. Patient hospital course now complicated by Ileus vs SBO. General surgery was consulted. NGT was placed and now removed.     Sepsis POA 2/2 Staphylococcus epidermidis bacteremia likely endovascular in origin w/ subsequent MV IE   Afebrile, WBC normalized   - Repeat BCXs from 09/27 NGTD  - LUE AV graft concerning for infectious source of bacteremia leading to IE now s/p removal on 10/1 by vascular  - s/p MUSTAPHA w/ evidence of large MV vegetation   - c/w cefazolin as per ID recs   - CT A/P re demonstrates aortic dissection stable from 05/2023 but no acute findings reported   - CTSx consulted for consideration of re-operation of MVR   - As per CTSx will hold off on surgery for now and on contrast studies given pt high surgical risk and risk of progressing to HD again   Plan for repeat MUSTAPHA as outpatient   Midline placed. To continue with antibiotics until 11/8  Initial plan was to discharge home, but now brother not able to care   Spoke to , plan for discharge to rehab         Ileus vs SBO -resolved   Tolerating regular diet       SANTOS on CKD III likely prerenal azotemia 2/2 GI losses and poor po intake - improved   - Cr improved  since admission (baseline 1.5-1.8),   - Avoid nephrotoxic meds or renally dose if needed   Torsemide resumed       Chronic HFpEF  - c/w Coreg  - - MUSTAPHA w/ LVEF 55-60%  - Monitor daily weights and I/O's  - Cardio following and recs noted   torsemide resumed     Paroxysmal Atrial fibrillation   - Rate controlled currently   - CHADSVASC of 5 and as per cardiology no need to bridge   c/w Eliquis since 10/10      HTN - BP better controlled   c/w  amlodipine and Carvedilol       Hx of SDH   - CTH has shown complete resolution since July 2023    Mitral/Aortic valve replacement   - Recent MUSTAPHA shows bioprosthetic valves with only moderate paravalvular leak in MV  - Now w/ large vegetation on MV  - CTSx following but no plan for surgical  intervention at this time   repeat MUSTAPHA after antibiotics completion      VTE ppx: HSQ  Dispo: Discharge planning to [facility]      64 y/o M w/ PMH of HTN, HLD, 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  Labs consistent w/ SANTOS on CKD.  Hospital course complicated by bacteremia w/ cxs growing staph epidermidus and TTE showing large MV endocarditis. Vascular consulted with concern of Infected prosthetic vascular graft,  Patient is now s/p removal of L AV graft. Was monitor in SICU and then transfer to floor. Vascular requesting transfer back to medicine service. Patient hospital course now complicated by Ileus vs SBO. General surgery was consulted. NGT was placed and now removed.     Sepsis POA 2/2 Staphylococcus epidermidis bacteremia likely endovascular in origin w/ subsequent MV IE   Afebrile, WBC normalized   - Repeat BCXs from 09/27 NGTD  - LUE AV graft concerning for infectious source of bacteremia leading to IE now s/p removal on 10/1 by vascular  - s/p MUSTAPHA w/ evidence of large MV vegetation   - c/w cefazolin as per ID recs   - CT A/P re demonstrates aortic dissection stable from 05/2023 but no acute findings reported   - CTSx consulted for consideration of re-operation of MVR   - As per CTSx will hold off on surgery for now and on contrast studies given pt high surgical risk and risk of progressing to HD again   Plan for repeat MUSTAPHA as outpatient   Midline placed. To continue with antibiotics until 11/8  Initial plan was to discharge home, but now brother not able to care   Spoke to , plan for discharge to rehab         Ileus vs SBO -resolved   Tolerating regular diet       SANTOS on CKD III likely prerenal azotemia 2/2 GI losses and poor po intake - improved   - Cr improved  since admission (baseline 1.5-1.8),   - Avoid nephrotoxic meds or renally dose if needed   Torsemide resumed       Chronic HFpEF  - c/w Coreg  - - MUSTAPHA w/ LVEF 55-60%  - Monitor daily weights and I/O's  - Cardio following and recs noted   torsemide resumed     Paroxysmal Atrial fibrillation   - Rate controlled currently   - CHADSVASC of 5 and as per cardiology no need to bridge   c/w Eliquis since 10/10      HTN - BP better controlled   c/w  amlodipine and Carvedilol       Hx of SDH   - CTH has shown complete resolution since July 2023  Switch keppra to PO     Mitral/Aortic valve replacement   - Recent MUSTAPHA shows bioprosthetic valves with only moderate paravalvular leak in MV  - Now w/ large vegetation on MV  - CTSx following but no plan for surgical  intervention at this time   repeat MUSTAPHA after antibiotics completion      VTE ppx: HSQ  Dispo: Discharge planning to [facility]

## 2023-10-13 NOTE — PROGRESS NOTE ADULT - SUBJECTIVE AND OBJECTIVE BOX
NEPHROLOGY INTERVAL HPI/OVERNIGHT EVENTS:  cont to feel ok  no acute distress    MEDICATIONS  (STANDING):  amLODIPine   Tablet 10 milliGRAM(s) Oral daily  apixaban 5 milliGRAM(s) Oral every 12 hours  carvedilol 6.25 milliGRAM(s) Oral every 12 hours  ceFAZolin  Injectable. 2000 milliGRAM(s) IV Push every 8 hours  cloNIDine Patch 0.1 mG/24Hr(s) 1 patch Transdermal every 7 days  levETIRAcetam  IVPB 1000 milliGRAM(s) IV Intermittent every 12 hours  polyethylene glycol 3350 17 Gram(s) Oral daily  senna 2 Tablet(s) Oral at bedtime  tamsulosin 0.4 milliGRAM(s) Oral at bedtime  torsemide 20 milliGRAM(s) Oral daily    MEDICATIONS  (PRN):  acetaminophen     Tablet .. 650 milliGRAM(s) Oral every 6 hours PRN Temp greater or equal to 38C (100.4F), Mild Pain (1 - 3)  aluminum hydroxide/magnesium hydroxide/simethicone Suspension 30 milliLiter(s) Oral every 4 hours PRN Dyspepsia  HYDROmorphone  Injectable 0.5 milliGRAM(s) IV Push every 6 hours PRN Moderate Pain (4 - 6)  HYDROmorphone  Injectable 1 milliGRAM(s) IV Push every 6 hours PRN Severe Pain (7 - 10)  melatonin 3 milliGRAM(s) Oral at bedtime PRN Insomnia  ondansetron Injectable 4 milliGRAM(s) IV Push every 8 hours PRN Nausea and/or Vomiting      Allergies    penicillin (Other)          Vital Signs Last 24 Hrs  T(C): 36.6 (13 Oct 2023 09:13), Max: 36.8 (12 Oct 2023 13:32)  T(F): 97.9 (13 Oct 2023 09:13), Max: 98.3 (12 Oct 2023 17:17)  HR: 67 (13 Oct 2023 09:13) (63 - 69)  BP: 119/71 (13 Oct 2023 09:13) (112/70 - 123/55)  BP(mean): --  RR: 18 (13 Oct 2023 09:13) (18 - 18)  SpO2: 95% (13 Oct 2023 09:13) (95% - 98%)    Parameters below as of 13 Oct 2023 09:13  Patient On (Oxygen Delivery Method): room air        PHYSICAL EXAM:  GENERAL: Comfortable  HEENT: No periorbital edema  NECK: Supple, no JVD  NERVOUS SYSTEM:  A/O x3  CHEST: Clear B/L   HEART:  RRR, No rub  ABDOMEN: Soft, NT/ND BS+  EXTREMITIES:  No dependent edema      LABS:  Basic Metabolic Panel in AM (10.11.23 @ 06:07)   Sodium: 136 mmol/L  Potassium: 4.1 mmol/L  Chloride: 103: Chloride reference range changed from ..10/26/2022 mmol/L  Carbon Dioxide: 22.0 mmol/L  Anion Gap: 11 mmol/L  Blood Urea Nitrogen: 18.7 mg/dL  Creatinine: 1.48 mg/dL  Glucose: 89 mg/dL  Calcium: 7.7 mg/dL                RADIOLOGY & ADDITIONAL TESTS:  < from: CT Angio Abdomen w/ IV Cont (10.04.23 @ 12:04) >    ACC: [id number] EXAM:  CT ANGIO ABDOMEN ONLY (W)AW IC   ORDERED BY: ALIZE ALMANZAR     PROCEDURE DATE:  10/04/2023          INTERPRETATION:  CLINICAL INFORMATION: Right renal upper pole lesion.    COMPARISON: Contrast-enhanced CT of the abdomen and pelvis October 3,   2023, May 22, 2023, June 23, 2011CT angiogram December 13, 2022, June 25, 2021, pre and postcontrast CT of the abdomen and pelvis March 12, 2021.    CONTRAST/COMPLICATIONS:  IV Contrast: Omnipaque 350  94 cc administered   6 cc discarded  Oral Contrast: NONE  Complications: None reported at time of study completion    PROCEDURE:  CT of the Abdomen and Pelvis was performed.  Precontrast, Arterial and Delayed phases were acquired.  Sagittal and coronal reformats were performed.    FINDINGS:  LOWER CHEST: Again are noted peribronchovascular nodular and confluent   opacities at the lung bases. Mitral valve replacement.    LIVER: Within normal limits.  BILE DUCTS: Normal caliber.  GALLBLADDER: Layering gallstone. Vicarious excretion of contrast.  SPLEEN: Within normal limits.  PANCREAS: Within normal limits. Incidental pancreas ductus divisum.  ADRENALS: Within normal limits.  KIDNEYS/URETERS: Again is noted a heterogeneously hyperenhancing 1.9 x   1.4 cm lesion withwashout in the posterior upper pole of the right   kidney at site of the previous thinly septated cystic lesion in June 2011. There are bilateral simple renal cysts. Again is noted a   wedge-shaped region of delayed enhancement in the lateral midpole of the   left kidney, compatible with subacute infarct.    Otherwise, the kidneys are symmetric in appearance and enhancement with   no evidence of hydronephrosis, stone, or perinephric stranding   bilaterally. The renal veins are patent. There is no paracaval or   para-aortic adenopathy.    BLADDER: Within normal limits.  REPRODUCTIVE ORGANS: The prostate is not enlarged.    BOWEL: Rectosigmoid resection with reanastomosis. Small bowel resection   with reanastomosis. Again are noted fluid-filled patulous loops of small   and large bowel. No bowel obstruction. There is no mesenteric adenopathy.  PERITONEUM: No ascites.  VESSELS: Again is noted a dissection of the descending thoracic aorta and   abdominal aorta extending to the right external iliac artery and into the   bilateral renal arteries.  RETROPERITONEUM/LYMPH NODES: No lymphadenopathy. Shotty aortocaval lymph   nodes.  ABDOMINAL WALL: Within normal limits.  BONES: Posterior spondylitic ridging at L4-L5.    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 No evidence of   adenopathy.    < end of copied text >

## 2023-10-14 LAB
ANION GAP SERPL CALC-SCNC: 11 MMOL/L — SIGNIFICANT CHANGE UP (ref 5–17)
BASOPHILS # BLD AUTO: 0.03 K/UL — SIGNIFICANT CHANGE UP (ref 0–0.2)
BASOPHILS NFR BLD AUTO: 0.3 % — SIGNIFICANT CHANGE UP (ref 0–2)
BUN SERPL-MCNC: 32.7 MG/DL — HIGH (ref 8–20)
CALCIUM SERPL-MCNC: 7.9 MG/DL — LOW (ref 8.4–10.5)
CHLORIDE SERPL-SCNC: 97 MMOL/L — SIGNIFICANT CHANGE UP (ref 96–108)
CO2 SERPL-SCNC: 25 MMOL/L — SIGNIFICANT CHANGE UP (ref 22–29)
CREAT SERPL-MCNC: 1.72 MG/DL — HIGH (ref 0.5–1.3)
EGFR: 44 ML/MIN/1.73M2 — LOW
EOSINOPHIL # BLD AUTO: 0.52 K/UL — HIGH (ref 0–0.5)
EOSINOPHIL NFR BLD AUTO: 5.2 % — SIGNIFICANT CHANGE UP (ref 0–6)
GLUCOSE SERPL-MCNC: 98 MG/DL — SIGNIFICANT CHANGE UP (ref 70–99)
HCT VFR BLD CALC: 31.2 % — LOW (ref 39–50)
HGB BLD-MCNC: 9.7 G/DL — LOW (ref 13–17)
IMM GRANULOCYTES NFR BLD AUTO: 0.7 % — SIGNIFICANT CHANGE UP (ref 0–0.9)
LYMPHOCYTES # BLD AUTO: 1.14 K/UL — SIGNIFICANT CHANGE UP (ref 1–3.3)
LYMPHOCYTES # BLD AUTO: 11.5 % — LOW (ref 13–44)
MAGNESIUM SERPL-MCNC: 1.4 MG/DL — LOW (ref 1.6–2.6)
MCHC RBC-ENTMCNC: 27.7 PG — SIGNIFICANT CHANGE UP (ref 27–34)
MCHC RBC-ENTMCNC: 31.1 GM/DL — LOW (ref 32–36)
MCV RBC AUTO: 89.1 FL — SIGNIFICANT CHANGE UP (ref 80–100)
MONOCYTES # BLD AUTO: 0.85 K/UL — SIGNIFICANT CHANGE UP (ref 0–0.9)
MONOCYTES NFR BLD AUTO: 8.6 % — SIGNIFICANT CHANGE UP (ref 2–14)
NEUTROPHILS # BLD AUTO: 7.3 K/UL — SIGNIFICANT CHANGE UP (ref 1.8–7.4)
NEUTROPHILS NFR BLD AUTO: 73.7 % — SIGNIFICANT CHANGE UP (ref 43–77)
PLATELET # BLD AUTO: 147 K/UL — LOW (ref 150–400)
POTASSIUM SERPL-MCNC: 3.3 MMOL/L — LOW (ref 3.5–5.3)
POTASSIUM SERPL-SCNC: 3.3 MMOL/L — LOW (ref 3.5–5.3)
RBC # BLD: 3.5 M/UL — LOW (ref 4.2–5.8)
RBC # FLD: 14.6 % — HIGH (ref 10.3–14.5)
SODIUM SERPL-SCNC: 133 MMOL/L — LOW (ref 135–145)
WBC # BLD: 9.91 K/UL — SIGNIFICANT CHANGE UP (ref 3.8–10.5)
WBC # FLD AUTO: 9.91 K/UL — SIGNIFICANT CHANGE UP (ref 3.8–10.5)

## 2023-10-14 PROCEDURE — 99232 SBSQ HOSP IP/OBS MODERATE 35: CPT

## 2023-10-14 RX ORDER — POTASSIUM CHLORIDE 20 MEQ
40 PACKET (EA) ORAL ONCE
Refills: 0 | Status: COMPLETED | OUTPATIENT
Start: 2023-10-14 | End: 2023-10-14

## 2023-10-14 RX ORDER — POTASSIUM CHLORIDE 20 MEQ
40 PACKET (EA) ORAL ONCE
Refills: 0 | Status: DISCONTINUED | OUTPATIENT
Start: 2023-10-14 | End: 2023-10-14

## 2023-10-14 RX ORDER — MAGNESIUM OXIDE 400 MG ORAL TABLET 241.3 MG
400 TABLET ORAL
Refills: 0 | Status: COMPLETED | OUTPATIENT
Start: 2023-10-14 | End: 2023-10-16

## 2023-10-14 RX ORDER — MAGNESIUM SULFATE 500 MG/ML
1 VIAL (ML) INJECTION ONCE
Refills: 0 | Status: COMPLETED | OUTPATIENT
Start: 2023-10-14 | End: 2023-10-14

## 2023-10-14 RX ADMIN — Medication 100 GRAM(S): at 12:39

## 2023-10-14 RX ADMIN — HYDROMORPHONE HYDROCHLORIDE 2 MILLIGRAM(S): 2 INJECTION INTRAMUSCULAR; INTRAVENOUS; SUBCUTANEOUS at 09:22

## 2023-10-14 RX ADMIN — Medication 2000 MILLIGRAM(S): at 23:27

## 2023-10-14 RX ADMIN — LEVETIRACETAM 1000 MILLIGRAM(S): 250 TABLET, FILM COATED ORAL at 05:37

## 2023-10-14 RX ADMIN — MAGNESIUM OXIDE 400 MG ORAL TABLET 400 MILLIGRAM(S): 241.3 TABLET ORAL at 18:58

## 2023-10-14 RX ADMIN — Medication 2000 MILLIGRAM(S): at 00:45

## 2023-10-14 RX ADMIN — Medication 650 MILLIGRAM(S): at 01:35

## 2023-10-14 RX ADMIN — HYDROMORPHONE HYDROCHLORIDE 2 MILLIGRAM(S): 2 INJECTION INTRAMUSCULAR; INTRAVENOUS; SUBCUTANEOUS at 21:50

## 2023-10-14 RX ADMIN — APIXABAN 5 MILLIGRAM(S): 2.5 TABLET, FILM COATED ORAL at 18:59

## 2023-10-14 RX ADMIN — LEVETIRACETAM 1000 MILLIGRAM(S): 250 TABLET, FILM COATED ORAL at 18:59

## 2023-10-14 RX ADMIN — HYDROMORPHONE HYDROCHLORIDE 2 MILLIGRAM(S): 2 INJECTION INTRAMUSCULAR; INTRAVENOUS; SUBCUTANEOUS at 10:22

## 2023-10-14 RX ADMIN — Medication 2000 MILLIGRAM(S): at 09:13

## 2023-10-14 RX ADMIN — AMLODIPINE BESYLATE 10 MILLIGRAM(S): 2.5 TABLET ORAL at 05:36

## 2023-10-14 RX ADMIN — Medication 1 PATCH: at 19:00

## 2023-10-14 RX ADMIN — CARVEDILOL PHOSPHATE 6.25 MILLIGRAM(S): 80 CAPSULE, EXTENDED RELEASE ORAL at 18:58

## 2023-10-14 RX ADMIN — CARVEDILOL PHOSPHATE 6.25 MILLIGRAM(S): 80 CAPSULE, EXTENDED RELEASE ORAL at 05:36

## 2023-10-14 RX ADMIN — Medication 20 MILLIGRAM(S): at 18:59

## 2023-10-14 RX ADMIN — HYDROMORPHONE HYDROCHLORIDE 2 MILLIGRAM(S): 2 INJECTION INTRAMUSCULAR; INTRAVENOUS; SUBCUTANEOUS at 20:51

## 2023-10-14 RX ADMIN — Medication 40 MILLIEQUIVALENT(S): at 12:37

## 2023-10-14 RX ADMIN — APIXABAN 5 MILLIGRAM(S): 2.5 TABLET, FILM COATED ORAL at 05:36

## 2023-10-14 RX ADMIN — Medication 2000 MILLIGRAM(S): at 18:59

## 2023-10-14 NOTE — PROGRESS NOTE ADULT - ASSESSMENT
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  - cont to avoid potential nephrotoxins  - cont IV antibiotics   - diuretics, adjust as needed and expect azotemia  - R renal mass r/o RCC---> needs urology evaluation/opinion    Last labs from 10/11    Will follow   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

## 2023-10-14 NOTE — PROGRESS NOTE ADULT - SUBJECTIVE AND OBJECTIVE BOX
NEPHROLOGY INTERVAL HPI/OVERNIGHT EVENTS:    MEDICATIONS  (STANDING):  amLODIPine   Tablet 10 milliGRAM(s) Oral daily  apixaban 5 milliGRAM(s) Oral every 12 hours  carvedilol 6.25 milliGRAM(s) Oral every 12 hours  ceFAZolin  Injectable. 2000 milliGRAM(s) IV Push every 8 hours  cloNIDine Patch 0.1 mG/24Hr(s) 1 patch Transdermal every 7 days  levETIRAcetam 1000 milliGRAM(s) Oral two times a day  polyethylene glycol 3350 17 Gram(s) Oral daily  senna 2 Tablet(s) Oral at bedtime  tamsulosin 0.4 milliGRAM(s) Oral at bedtime  torsemide 20 milliGRAM(s) Oral daily    MEDICATIONS  (PRN):  acetaminophen     Tablet .. 650 milliGRAM(s) Oral every 6 hours PRN Temp greater or equal to 38C (100.4F), Mild Pain (1 - 3)  aluminum hydroxide/magnesium hydroxide/simethicone Suspension 30 milliLiter(s) Oral every 4 hours PRN Dyspepsia  HYDROmorphone   Tablet 2 milliGRAM(s) Oral every 6 hours PRN Severe Pain (7 - 10)  melatonin 3 milliGRAM(s) Oral at bedtime PRN Insomnia  ondansetron Injectable 4 milliGRAM(s) IV Push every 8 hours PRN Nausea and/or Vomiting      Allergies    penicillin (Other)    Intolerances        Vital Signs Last 24 Hrs  T(C): 36.7 (14 Oct 2023 04:21), Max: 36.9 (13 Oct 2023 21:07)  T(F): 98.1 (14 Oct 2023 04:21), Max: 98.5 (13 Oct 2023 21:07)  HR: 70 (14 Oct 2023 04:21) (63 - 72)  BP: 113/67 (14 Oct 2023 04:21) (107/63 - 141/65)  BP(mean): --  RR: 18 (14 Oct 2023 04:21) (18 - 18)  SpO2: 96% (14 Oct 2023 04:21) (95% - 98%)    Parameters below as of 14 Oct 2023 04:21  Patient On (Oxygen Delivery Method): room air      PHYSICAL EXAM:  GENERAL: Comfortable  HEENT: NCAT  NECK: Supple, no JVD  NERVOUS SYSTEM:  A/O x3  CHEST: Clear B/L   HEART:  RRR, No rub  ABDOMEN: Soft, NT/ND BS+  EXTREMITIES:  No edema    LABS:                      RADIOLOGY & ADDITIONAL TESTS:   NEPHROLOGY INTERVAL HPI/OVERNIGHT EVENTS:    Examined  Feeling better  Denies HA CP no SOB    MEDICATIONS  (STANDING):  amLODIPine   Tablet 10 milliGRAM(s) Oral daily  apixaban 5 milliGRAM(s) Oral every 12 hours  carvedilol 6.25 milliGRAM(s) Oral every 12 hours  ceFAZolin  Injectable. 2000 milliGRAM(s) IV Push every 8 hours  cloNIDine Patch 0.1 mG/24Hr(s) 1 patch Transdermal every 7 days  levETIRAcetam 1000 milliGRAM(s) Oral two times a day  polyethylene glycol 3350 17 Gram(s) Oral daily  senna 2 Tablet(s) Oral at bedtime  tamsulosin 0.4 milliGRAM(s) Oral at bedtime  torsemide 20 milliGRAM(s) Oral daily    MEDICATIONS  (PRN):  acetaminophen     Tablet .. 650 milliGRAM(s) Oral every 6 hours PRN Temp greater or equal to 38C (100.4F), Mild Pain (1 - 3)  aluminum hydroxide/magnesium hydroxide/simethicone Suspension 30 milliLiter(s) Oral every 4 hours PRN Dyspepsia  HYDROmorphone   Tablet 2 milliGRAM(s) Oral every 6 hours PRN Severe Pain (7 - 10)  melatonin 3 milliGRAM(s) Oral at bedtime PRN Insomnia  ondansetron Injectable 4 milliGRAM(s) IV Push every 8 hours PRN Nausea and/or Vomiting      Allergies    penicillin (Other)    Intolerances        Vital Signs Last 24 Hrs  T(C): 36.7 (14 Oct 2023 04:21), Max: 36.9 (13 Oct 2023 21:07)  T(F): 98.1 (14 Oct 2023 04:21), Max: 98.5 (13 Oct 2023 21:07)  HR: 70 (14 Oct 2023 04:21) (63 - 72)  BP: 113/67 (14 Oct 2023 04:21) (107/63 - 141/65)  BP(mean): --  RR: 18 (14 Oct 2023 04:21) (18 - 18)  SpO2: 96% (14 Oct 2023 04:21) (95% - 98%)    Parameters below as of 14 Oct 2023 04:21  Patient On (Oxygen Delivery Method): room air      PHYSICAL EXAM:  GENERAL: Comfortable  HEENT: NCAT  NECK: Supple, no JVD  NERVOUS SYSTEM:  A/O x3  CHEST: Clear B/L   HEART:  RRR, No rub  ABDOMEN: Soft, NT/ND BS+  EXTREMITIES:  No edema    LABS:                      RADIOLOGY & ADDITIONAL TESTS:

## 2023-10-14 NOTE — INPATIENT CERTIFICATION FOR MEDICARE PATIENTS - RISKS OF ADVERSE EVENTS
Couple of episodes of dizziness associated with diaphoresis and visual changes   EKG flattening twave on lateral leads  Recommend ASAP ER evaluation   
Concern for delay in diagnosis and treatment

## 2023-10-14 NOTE — PROGRESS NOTE ADULT - SUBJECTIVE AND OBJECTIVE BOX
JONATHAN GIBSON    [id number]    63y      Male    CC: n/v    INTERVAL HPI/OVERNIGHT EVENTS: pt seen and examined. no acute events reported o/n     REVIEW OF SYSTEMS:    CONSTITUTIONAL: No weight loss  RESPIRATORY: No cough, wheezing, hemoptysis; No shortness of breath  CARDIOVASCULAR: No chest pain, palpitations  GASTROINTESTINAL: No abdominal or epigastric pain. No nausea, vomiting  NEUROLOGICAL: No headaches    Vital Signs Last 24 Hrs  T(C): 36.6 (14 Oct 2023 11:11), Max: 36.9 (13 Oct 2023 21:07)  T(F): 97.8 (14 Oct 2023 11:11), Max: 98.5 (13 Oct 2023 21:07)  HR: 65 (14 Oct 2023 11:11) (63 - 72)  BP: 107/61 (14 Oct 2023 11:11) (107/61 - 141/65)  BP(mean): --  RR: 18 (14 Oct 2023 11:11) (18 - 18)  SpO2: 94% (14 Oct 2023 11:11) (94% - 98%)    Parameters below as of 14 Oct 2023 11:11  Patient On (Oxygen Delivery Method): room air        PHYSICAL EXAM:    GENERAL: NAD  HEENT: PERRL, +EOMI  CHEST/LUNG: Clear to auscultation bilaterally  HEART: S1S2+, Regular rate and rhythm  ABDOMEN: Soft, Nontender, Nondistended; Bowel sounds present  SKIN: warm, dry  NEURO: Awake, alert   PSYCH: calm, cooperative     LABS:                        9.7    9.91  )-----------( 147      ( 14 Oct 2023 06:56 )             31.2     10-14    133<L>  |  97  |  32.7<H>  ----------------------------<  98  3.3<L>   |  25.0  |  1.72<H>    Ca    7.9<L>      14 Oct 2023 06:56  Mg     1.4     10-14        Urinalysis Basic - ( 14 Oct 2023 06:56 )    Color: x / Appearance: x / SG: x / pH: x  Gluc: 98 mg/dL / Ketone: x  / Bili: x / Urobili: x   Blood: x / Protein: x / Nitrite: x   Leuk Esterase: x / RBC: x / WBC x   Sq Epi: x / Non Sq Epi: x / Bacteria: x          MEDICATIONS  (STANDING):  amLODIPine   Tablet 10 milliGRAM(s) Oral daily  apixaban 5 milliGRAM(s) Oral every 12 hours  carvedilol 6.25 milliGRAM(s) Oral every 12 hours  ceFAZolin  Injectable. 2000 milliGRAM(s) IV Push every 8 hours  cloNIDine Patch 0.1 mG/24Hr(s) 1 patch Transdermal every 7 days  levETIRAcetam 1000 milliGRAM(s) Oral two times a day  magnesium oxide 400 milliGRAM(s) Oral two times a day with meals  polyethylene glycol 3350 17 Gram(s) Oral daily  potassium chloride    Tablet ER 40 milliEquivalent(s) Oral once  senna 2 Tablet(s) Oral at bedtime  tamsulosin 0.4 milliGRAM(s) Oral at bedtime  torsemide 20 milliGRAM(s) Oral daily    MEDICATIONS  (PRN):  acetaminophen     Tablet .. 650 milliGRAM(s) Oral every 6 hours PRN Temp greater or equal to 38C (100.4F), Mild Pain (1 - 3)  aluminum hydroxide/magnesium hydroxide/simethicone Suspension 30 milliLiter(s) Oral every 4 hours PRN Dyspepsia  HYDROmorphone   Tablet 2 milliGRAM(s) Oral every 6 hours PRN Severe Pain (7 - 10)  melatonin 3 milliGRAM(s) Oral at bedtime PRN Insomnia  ondansetron Injectable 4 milliGRAM(s) IV Push every 8 hours PRN Nausea and/or Vomiting      RADIOLOGY & ADDITIONAL TESTS:

## 2023-10-14 NOTE — PROGRESS NOTE ADULT - ASSESSMENT
63y/oM PMH HTN, HLD, type A aortic dissection w/bowel ischemia requiring bowel resection and ostomy with reversal, w/repeat sternotomy and CABG x2, CHF (mixed systolic, diastolic), MV and AV replacement, CKD III (on short term HD 2022), AF s/p ablation pending Watchman, SDH (resolved as of 7/2023 imaging) presenting to [facility] with 2 weeks inability to tolerate PO. Initially admitted with SANTOS on CKD. Hospital course complicated by staph epidermidis bacteremia and TTE with large MV endocarditis. Also with concern for infected L AV graft, now s/p graft removal. Course further complicated by ileus vs SBO, now resolved.  63y/oM PMH HTN, HLD, type A aortic dissection w/bowel ischemia requiring bowel resection and ostomy with reversal, w/repeat sternotomy and CABG x2, CHF (mixed systolic, diastolic), MV and AV replacement, CKD III (on short term HD 2022), AF s/p ablation pending Watchman, SDH (resolved as of 7/2023 imaging) presenting to [facility] with 2 weeks inability to tolerate PO. Initially admitted with SANTOS on CKD. Hospital course complicated by staph epidermidis bacteremia and TTE with large MV endocarditis. Also with concern for infected L AV graft, now s/p graft removal. Course further complicated by ileus vs SBO, now resolved.     Sepsis, POA 2/2 staph epidermidis bacteremia   MV Infective endocarditis 63y/oM PMH HTN, HLD, type A aortic dissection w/bowel ischemia requiring bowel resection and ostomy with reversal, w/repeat sternotomy and CABG x2, CHF (mixed systolic, diastolic), MV and AV replacement, CKD III (on short term HD 2022), AF s/p ablation pending Watchman, SDH (resolved as of 7/2023 imaging) presenting to [facility] with 2 weeks inability to tolerate PO. Initially admitted with SANTOS on CKD. Hospital course complicated by staph epidermidis bacteremia and TTE with large MV endocarditis. Also with concern for infected L AV graft, now s/p graft removal. Course further complicated by ileus vs SBO, now resolved.     Sepsis, POA 2/2 staph epidermidis bacteremia   MV Infective endocarditis  -s/p L AV graft removal (10/1)  -s/p MUSTAPHA 9/27: +large mobile vegetation 1.8 x 1.0cm on bioprosthetic MV  -CTSx recs appreciated  -plan to tx pt with 4 weeks IV abx and repeat MUSTAPHA at that time given pt is high risk for surgery, deferring further contrast studies due to high risk of progressing to HD again   -ID recs appreciated   -midline placed 10/13    Ileus vs SBO   -resolved, tolerating diet     SANTOS on CKD    63y/oM PMH HTN, HLD, type A aortic dissection w/bowel ischemia requiring bowel resection and ostomy with reversal, w/repeat sternotomy and CABG x2, CHF (mixed systolic, diastolic), MV and AV replacement, CKD III (on short term HD 2021), AF s/p ablation pending Watchman, SDH (resolved as of 7/2023 imaging) presenting to [facility] with 2 weeks inability to tolerate PO. Initially admitted with SANTOS on CKD. Hospital course complicated by staph epidermidis bacteremia and TTE with large MV endocarditis. Also with concern for infected L AV graft, now s/p graft removal. Course further complicated by ileus vs SBO, now resolved.     Sepsis, POA 2/2 staph epidermidis bacteremia   MV Infective endocarditis  -s/p L AV graft removal (10/1)  -s/p MUSTAPHA 9/27: +large mobile vegetation 1.8 x 1.0cm on bioprosthetic MV  -CTSx recs appreciated  -plan to tx pt with 4 weeks IV abx and repeat MUSTAPHA at that time given pt is high risk for surgery, deferring further contrast studies due to high risk of progressing to HD again   -ID recs appreciated   -midline placed 10/13  -cont abx as per ID     Ileus vs SBO   -resolved, tolerating diet     SANTOS on CKD   -nephro recs appreciated   -baseline sCr 1.5-1.8  -hx of temporary HD (ended 11/2021)    Chronic systolic and diastolic HFpEF   paroxysmal afib   HTN  -MUSTAPHA LVEF 55-60%  -cardio recs appreciated   -cont amlodipine, coreg, clonidine, torsemide   -cont eliquis     Hx SDH   -CTH with resolution since 7/2023  -cont keppra     R kidney lesion   -CTA abd: 1.9x1.7cm heterogeneous lesion in posterior upper pole, suggestive of low-grade cystic RCC  -f/u urology outpt     VTE ppx: eliquis     dispo: SEAN pending placement

## 2023-10-15 LAB
ANION GAP SERPL CALC-SCNC: 12 MMOL/L — SIGNIFICANT CHANGE UP (ref 5–17)
ANION GAP SERPL CALC-SCNC: 12 MMOL/L — SIGNIFICANT CHANGE UP (ref 5–17)
BUN SERPL-MCNC: 28.3 MG/DL — HIGH (ref 8–20)
BUN SERPL-MCNC: 30.9 MG/DL — HIGH (ref 8–20)
CALCIUM SERPL-MCNC: 7.9 MG/DL — LOW (ref 8.4–10.5)
CALCIUM SERPL-MCNC: 8.5 MG/DL — SIGNIFICANT CHANGE UP (ref 8.4–10.5)
CHLORIDE SERPL-SCNC: 98 MMOL/L — SIGNIFICANT CHANGE UP (ref 96–108)
CHLORIDE SERPL-SCNC: 99 MMOL/L — SIGNIFICANT CHANGE UP (ref 96–108)
CO2 SERPL-SCNC: 23 MMOL/L — SIGNIFICANT CHANGE UP (ref 22–29)
CO2 SERPL-SCNC: 25 MMOL/L — SIGNIFICANT CHANGE UP (ref 22–29)
CREAT SERPL-MCNC: 1.69 MG/DL — HIGH (ref 0.5–1.3)
CREAT SERPL-MCNC: 1.91 MG/DL — HIGH (ref 0.5–1.3)
EGFR: 39 ML/MIN/1.73M2 — LOW
EGFR: 45 ML/MIN/1.73M2 — LOW
GLUCOSE SERPL-MCNC: 106 MG/DL — HIGH (ref 70–99)
GLUCOSE SERPL-MCNC: 149 MG/DL — HIGH (ref 70–99)
HCT VFR BLD CALC: 30.6 % — LOW (ref 39–50)
HGB BLD-MCNC: 9.8 G/DL — LOW (ref 13–17)
MAGNESIUM SERPL-MCNC: 1.6 MG/DL — SIGNIFICANT CHANGE UP (ref 1.6–2.6)
MAGNESIUM SERPL-MCNC: 1.6 MG/DL — SIGNIFICANT CHANGE UP (ref 1.6–2.6)
MCHC RBC-ENTMCNC: 28.2 PG — SIGNIFICANT CHANGE UP (ref 27–34)
MCHC RBC-ENTMCNC: 32 GM/DL — SIGNIFICANT CHANGE UP (ref 32–36)
MCV RBC AUTO: 87.9 FL — SIGNIFICANT CHANGE UP (ref 80–100)
PHOSPHATE SERPL-MCNC: 2.4 MG/DL — SIGNIFICANT CHANGE UP (ref 2.4–4.7)
PHOSPHATE SERPL-MCNC: 2.5 MG/DL — SIGNIFICANT CHANGE UP (ref 2.4–4.7)
PLATELET # BLD AUTO: 155 K/UL — SIGNIFICANT CHANGE UP (ref 150–400)
POTASSIUM SERPL-MCNC: 3.2 MMOL/L — LOW (ref 3.5–5.3)
POTASSIUM SERPL-MCNC: 3.4 MMOL/L — LOW (ref 3.5–5.3)
POTASSIUM SERPL-SCNC: 3.2 MMOL/L — LOW (ref 3.5–5.3)
POTASSIUM SERPL-SCNC: 3.4 MMOL/L — LOW (ref 3.5–5.3)
RBC # BLD: 3.48 M/UL — LOW (ref 4.2–5.8)
RBC # FLD: 14.6 % — HIGH (ref 10.3–14.5)
SODIUM SERPL-SCNC: 133 MMOL/L — LOW (ref 135–145)
SODIUM SERPL-SCNC: 136 MMOL/L — SIGNIFICANT CHANGE UP (ref 135–145)
WBC # BLD: 9.99 K/UL — SIGNIFICANT CHANGE UP (ref 3.8–10.5)
WBC # FLD AUTO: 9.99 K/UL — SIGNIFICANT CHANGE UP (ref 3.8–10.5)

## 2023-10-15 PROCEDURE — 99232 SBSQ HOSP IP/OBS MODERATE 35: CPT

## 2023-10-15 RX ORDER — MAGNESIUM SULFATE 500 MG/ML
1 VIAL (ML) INJECTION ONCE
Refills: 0 | Status: COMPLETED | OUTPATIENT
Start: 2023-10-15 | End: 2023-10-15

## 2023-10-15 RX ORDER — POTASSIUM CHLORIDE 20 MEQ
20 PACKET (EA) ORAL DAILY
Refills: 0 | Status: DISCONTINUED | OUTPATIENT
Start: 2023-10-15 | End: 2023-10-16

## 2023-10-15 RX ORDER — POTASSIUM CHLORIDE 20 MEQ
10 PACKET (EA) ORAL
Refills: 0 | Status: COMPLETED | OUTPATIENT
Start: 2023-10-15 | End: 2023-10-15

## 2023-10-15 RX ORDER — POTASSIUM CHLORIDE 20 MEQ
40 PACKET (EA) ORAL ONCE
Refills: 0 | Status: COMPLETED | OUTPATIENT
Start: 2023-10-15 | End: 2023-10-15

## 2023-10-15 RX ADMIN — HYDROMORPHONE HYDROCHLORIDE 2 MILLIGRAM(S): 2 INJECTION INTRAMUSCULAR; INTRAVENOUS; SUBCUTANEOUS at 06:15

## 2023-10-15 RX ADMIN — HYDROMORPHONE HYDROCHLORIDE 1 MILLIGRAM(S): 2 INJECTION INTRAMUSCULAR; INTRAVENOUS; SUBCUTANEOUS at 21:49

## 2023-10-15 RX ADMIN — LEVETIRACETAM 1000 MILLIGRAM(S): 250 TABLET, FILM COATED ORAL at 17:04

## 2023-10-15 RX ADMIN — Medication 100 MILLIEQUIVALENT(S): at 05:31

## 2023-10-15 RX ADMIN — HYDROMORPHONE HYDROCHLORIDE 2 MILLIGRAM(S): 2 INJECTION INTRAMUSCULAR; INTRAVENOUS; SUBCUTANEOUS at 16:13

## 2023-10-15 RX ADMIN — HYDROMORPHONE HYDROCHLORIDE 2 MILLIGRAM(S): 2 INJECTION INTRAMUSCULAR; INTRAVENOUS; SUBCUTANEOUS at 05:58

## 2023-10-15 RX ADMIN — Medication 1000 MILLIGRAM(S): at 21:49

## 2023-10-15 RX ADMIN — Medication 100 MILLIEQUIVALENT(S): at 12:01

## 2023-10-15 RX ADMIN — Medication 1 PATCH: at 07:30

## 2023-10-15 RX ADMIN — MAGNESIUM OXIDE 400 MG ORAL TABLET 400 MILLIGRAM(S): 241.3 TABLET ORAL at 17:03

## 2023-10-15 RX ADMIN — AMLODIPINE BESYLATE 10 MILLIGRAM(S): 2.5 TABLET ORAL at 05:32

## 2023-10-15 RX ADMIN — CARVEDILOL PHOSPHATE 6.25 MILLIGRAM(S): 80 CAPSULE, EXTENDED RELEASE ORAL at 17:03

## 2023-10-15 RX ADMIN — Medication 2000 MILLIGRAM(S): at 09:08

## 2023-10-15 RX ADMIN — Medication 20 MILLIEQUIVALENT(S): at 15:13

## 2023-10-15 RX ADMIN — Medication 20 MILLIGRAM(S): at 05:32

## 2023-10-15 RX ADMIN — Medication 1 PATCH: at 12:02

## 2023-10-15 RX ADMIN — Medication 40 MILLIEQUIVALENT(S): at 05:31

## 2023-10-15 RX ADMIN — MAGNESIUM OXIDE 400 MG ORAL TABLET 400 MILLIGRAM(S): 241.3 TABLET ORAL at 09:08

## 2023-10-15 RX ADMIN — Medication 2000 MILLIGRAM(S): at 15:13

## 2023-10-15 RX ADMIN — LEVETIRACETAM 1000 MILLIGRAM(S): 250 TABLET, FILM COATED ORAL at 05:31

## 2023-10-15 RX ADMIN — Medication 100 MILLIEQUIVALENT(S): at 07:00

## 2023-10-15 RX ADMIN — Medication 100 GRAM(S): at 09:08

## 2023-10-15 RX ADMIN — APIXABAN 5 MILLIGRAM(S): 2.5 TABLET, FILM COATED ORAL at 17:04

## 2023-10-15 RX ADMIN — APIXABAN 5 MILLIGRAM(S): 2.5 TABLET, FILM COATED ORAL at 05:32

## 2023-10-15 RX ADMIN — HYDROMORPHONE HYDROCHLORIDE 2 MILLIGRAM(S): 2 INJECTION INTRAMUSCULAR; INTRAVENOUS; SUBCUTANEOUS at 15:13

## 2023-10-15 NOTE — CHART NOTE - NSCHARTNOTESELECT_GEN_ALL_CORE
Down Grade/Transfer Note
Event Note
Post op check/Event Note
Event Note
NGT placement/Event Note
Nutrition Services
Nutrition Services

## 2023-10-15 NOTE — PROGRESS NOTE ADULT - ASSESSMENT
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

## 2023-10-15 NOTE — PROGRESS NOTE ADULT - SUBJECTIVE AND OBJECTIVE BOX
NEPHROLOGY INTERVAL HPI/OVERNIGHT EVENTS:    Examined  Feeling better  Denies HA CP no SOB    MEDICATIONS  (STANDING):  amLODIPine   Tablet 10 milliGRAM(s) Oral daily  apixaban 5 milliGRAM(s) Oral every 12 hours  carvedilol 6.25 milliGRAM(s) Oral every 12 hours  ceFAZolin  Injectable. 2000 milliGRAM(s) IV Push every 8 hours  cloNIDine Patch 0.1 mG/24Hr(s) 1 patch Transdermal every 7 days  levETIRAcetam 1000 milliGRAM(s) Oral two times a day  magnesium oxide 400 milliGRAM(s) Oral two times a day with meals  polyethylene glycol 3350 17 Gram(s) Oral daily  potassium chloride  10 mEq/100 mL IVPB 10 milliEquivalent(s) IV Intermittent every 1 hour  senna 2 Tablet(s) Oral at bedtime  tamsulosin 0.4 milliGRAM(s) Oral at bedtime  torsemide 20 milliGRAM(s) Oral daily    MEDICATIONS  (PRN):  acetaminophen     Tablet .. 650 milliGRAM(s) Oral every 6 hours PRN Temp greater or equal to 38C (100.4F), Mild Pain (1 - 3)  aluminum hydroxide/magnesium hydroxide/simethicone Suspension 30 milliLiter(s) Oral every 4 hours PRN Dyspepsia  HYDROmorphone   Tablet 2 milliGRAM(s) Oral every 6 hours PRN Severe Pain (7 - 10)  melatonin 3 milliGRAM(s) Oral at bedtime PRN Insomnia  ondansetron Injectable 4 milliGRAM(s) IV Push every 8 hours PRN Nausea and/or Vomiting      Allergies    penicillin (Other)    Intolerances        Vital Signs Last 24 Hrs  T(C): 36.6 (15 Oct 2023 04:51), Max: 36.9 (14 Oct 2023 17:10)  T(F): 97.9 (15 Oct 2023 04:51), Max: 98.4 (14 Oct 2023 17:10)  HR: 66 (15 Oct 2023 04:51) (65 - 75)  BP: 108/55 (15 Oct 2023 04:51) (104/57 - 114/67)  BP(mean): --  RR: 18 (15 Oct 2023 04:51) (18 - 18)  SpO2: 94% (15 Oct 2023 04:51) (94% - 96%)    Parameters below as of 15 Oct 2023 04:51  Patient On (Oxygen Delivery Method): room air    PHYSICAL EXAM:  GENERAL: NAD  HEENT: NCAT  NECK: Supple, no JVD  NERVOUS SYSTEM:  A/O x3  CHEST: Clear B/L   HEART:  RRR, No rub  ABDOMEN: Soft, NT/ND BS+  EXTREMITIES:  No edema    LABS:                        9.8    9.99  )-----------( 155      ( 15 Oct 2023 09:06 )             30.6     10-15    136  |  99  |  28.3<H>  ----------------------------<  149<H>  3.2<L>   |  25.0  |  1.69<H>    Ca    8.5      15 Oct 2023 09:06  Phos  2.5     10-15  Mg     1.6     10-15        Urinalysis Basic - ( 15 Oct 2023 09:06 )    Color: x / Appearance: x / SG: x / pH: x  Gluc: 149 mg/dL / Ketone: x  / Bili: x / Urobili: x   Blood: x / Protein: x / Nitrite: x   Leuk Esterase: x / RBC: x / WBC x   Sq Epi: x / Non Sq Epi: x / Bacteria: x      Magnesium: 1.6 mg/dL (10-15 @ 09:06)  Phosphorus: 2.5 mg/dL (10-15 @ 09:06)  Magnesium: 1.6 mg/dL (10-15 @ 00:54)  Phosphorus: 2.4 mg/dL (10-15 @ 00:54)          RADIOLOGY & ADDITIONAL TESTS:

## 2023-10-15 NOTE — PROGRESS NOTE ADULT - SUBJECTIVE AND OBJECTIVE BOX
JONATHAN GIBSON    [id number]    63y      Male    CC: eric    INTERVAL HPI/OVERNIGHT EVENTS: pt seen and examined. 10beats nsvt reported o/n. no new complaints     REVIEW OF SYSTEMS:    CONSTITUTIONAL: No fever, weight loss  RESPIRATORY: No cough, wheezing, hemoptysis; No shortness of breath  CARDIOVASCULAR: No chest pain, palpitations  GASTROINTESTINAL: No abdominal or epigastric pain. No nausea, vomiting  NEUROLOGICAL: No headaches    Vital Signs Last 24 Hrs  T(C): 36.6 (15 Oct 2023 04:51), Max: 36.9 (14 Oct 2023 17:10)  T(F): 97.9 (15 Oct 2023 04:51), Max: 98.4 (14 Oct 2023 17:10)  HR: 66 (15 Oct 2023 04:51) (66 - 75)  BP: 108/55 (15 Oct 2023 04:51) (104/57 - 114/67)  BP(mean): --  RR: 18 (15 Oct 2023 04:51) (18 - 18)  SpO2: 94% (15 Oct 2023 04:51) (94% - 96%)    Parameters below as of 15 Oct 2023 04:51  Patient On (Oxygen Delivery Method): room air        PHYSICAL EXAM:    GENERAL: NAD  HEENT: PERRL, +EOMI  CHEST/LUNG: Clear to auscultation bilaterally  HEART: S1S2+, Regular rate and rhythm  ABDOMEN: Soft, Nontender, Nondistended; Bowel sounds present  SKIN: warm, dry  NEURO: Awake, alert   PSYCH: calm, cooperative     LABS:                        9.8    9.99  )-----------( 155      ( 15 Oct 2023 09:06 )             30.6     10-15    136  |  99  |  28.3<H>  ----------------------------<  149<H>  3.2<L>   |  25.0  |  1.69<H>    Ca    8.5      15 Oct 2023 09:06  Phos  2.5     10-15  Mg     1.6     10-15        Urinalysis Basic - ( 15 Oct 2023 09:06 )    Color: x / Appearance: x / SG: x / pH: x  Gluc: 149 mg/dL / Ketone: x  / Bili: x / Urobili: x   Blood: x / Protein: x / Nitrite: x   Leuk Esterase: x / RBC: x / WBC x   Sq Epi: x / Non Sq Epi: x / Bacteria: x          MEDICATIONS  (STANDING):  amLODIPine   Tablet 10 milliGRAM(s) Oral daily  apixaban 5 milliGRAM(s) Oral every 12 hours  carvedilol 6.25 milliGRAM(s) Oral every 12 hours  ceFAZolin  Injectable. 2000 milliGRAM(s) IV Push every 8 hours  cloNIDine Patch 0.1 mG/24Hr(s) 1 patch Transdermal every 7 days  levETIRAcetam 1000 milliGRAM(s) Oral two times a day  magnesium oxide 400 milliGRAM(s) Oral two times a day with meals  polyethylene glycol 3350 17 Gram(s) Oral daily  potassium chloride   Powder 20 milliEquivalent(s) Oral daily  potassium chloride  10 mEq/100 mL IVPB 10 milliEquivalent(s) IV Intermittent every 1 hour  senna 2 Tablet(s) Oral at bedtime  tamsulosin 0.4 milliGRAM(s) Oral at bedtime  torsemide 20 milliGRAM(s) Oral daily    MEDICATIONS  (PRN):  acetaminophen     Tablet .. 650 milliGRAM(s) Oral every 6 hours PRN Temp greater or equal to 38C (100.4F), Mild Pain (1 - 3)  aluminum hydroxide/magnesium hydroxide/simethicone Suspension 30 milliLiter(s) Oral every 4 hours PRN Dyspepsia  HYDROmorphone   Tablet 2 milliGRAM(s) Oral every 6 hours PRN Severe Pain (7 - 10)  melatonin 3 milliGRAM(s) Oral at bedtime PRN Insomnia  ondansetron Injectable 4 milliGRAM(s) IV Push every 8 hours PRN Nausea and/or Vomiting      RADIOLOGY & ADDITIONAL TESTS:

## 2023-10-15 NOTE — PROGRESS NOTE ADULT - ASSESSMENT
63y/oM PMH HTN, HLD, type A aortic dissection w/bowel ischemia requiring bowel resection and ostomy with reversal, w/repeat sternotomy and CABG x2, CHF (mixed systolic, diastolic), MV and AV replacement, CKD III (on short term HD 2021), AF s/p ablation pending Watchman, SDH (resolved as of 7/2023 imaging) presenting to [facility] with 2 weeks inability to tolerate PO. Initially admitted with SANTOS on CKD. Hospital course complicated by staph epidermidis bacteremia and TTE with large MV endocarditis. Also with concern for infected L AV graft, now s/p graft removal. Course further complicated by ileus vs SBO, now resolved.     Sepsis, POA 2/2 staph epidermidis bacteremia   MV Infective endocarditis  -s/p L AV graft removal (10/1)  -s/p MUSTAPHA 9/27: +large mobile vegetation 1.8 x 1.0cm on bioprosthetic MV  -CTSx recs appreciated  -plan to tx pt with 4 weeks IV abx and repeat MUSTAPHA at that time given pt is high risk for surgery, deferring further contrast studies due to high risk of progressing to HD again   -ID recs appreciated   -midline placed 10/13  -cont abx as per ID     Ileus vs SBO   -resolved, tolerating diet     SANTOS on CKD   -nephro recs appreciated   -baseline sCr 1.5-1.8  -hx of temporary HD (ended 11/2021)    Hypokalemia   -repleted, daily supplement added as per nephro   -f/u am bmp    Chronic systolic and diastolic HFpEF   paroxysmal afib   HTN  -MUSTAPHA LVEF 55-60%  -cardio recs appreciated   -cont amlodipine, coreg, clonidine, torsemide   -cont eliquis     Hx SDH   -CTH with resolution since 7/2023  -cont keppra     R kidney lesion   -CTA abd: 1.9x1.7cm heterogeneous lesion in posterior upper pole, suggestive of low-grade cystic RCC  -f/u urology outpt     VTE ppx: eliquis     dispo: SEAN pending placement

## 2023-10-15 NOTE — CHART NOTE - NSCHARTNOTEFT_GEN_A_CORE
Pt with 10 beats NSVT  Resting comfortably in NAD   Denies chest pain, SOB, palpitations, n/v/d/c, abdominal pain, HA, dizziness, blurry vision  All vital signs stable  Stat BMP, Mag, Phos  RN to notify of any acute change in pt status

## 2023-10-16 ENCOUNTER — TRANSCRIPTION ENCOUNTER (OUTPATIENT)
Age: 63
End: 2023-10-16

## 2023-10-16 VITALS
TEMPERATURE: 98 F | SYSTOLIC BLOOD PRESSURE: 113 MMHG | RESPIRATION RATE: 18 BRPM | OXYGEN SATURATION: 99 % | HEART RATE: 70 BPM | DIASTOLIC BLOOD PRESSURE: 64 MMHG

## 2023-10-16 LAB
ANION GAP SERPL CALC-SCNC: 12 MMOL/L — SIGNIFICANT CHANGE UP (ref 5–17)
BUN SERPL-MCNC: 34.1 MG/DL — HIGH (ref 8–20)
CALCIUM SERPL-MCNC: 8.6 MG/DL — SIGNIFICANT CHANGE UP (ref 8.4–10.5)
CHLORIDE SERPL-SCNC: 99 MMOL/L — SIGNIFICANT CHANGE UP (ref 96–108)
CO2 SERPL-SCNC: 25 MMOL/L — SIGNIFICANT CHANGE UP (ref 22–29)
CREAT SERPL-MCNC: 1.77 MG/DL — HIGH (ref 0.5–1.3)
EGFR: 43 ML/MIN/1.73M2 — LOW
GLUCOSE SERPL-MCNC: 101 MG/DL — HIGH (ref 70–99)
MAGNESIUM SERPL-MCNC: 1.8 MG/DL — SIGNIFICANT CHANGE UP (ref 1.6–2.6)
PHOSPHATE SERPL-MCNC: 2.6 MG/DL — SIGNIFICANT CHANGE UP (ref 2.4–4.7)
POTASSIUM SERPL-MCNC: 3.8 MMOL/L — SIGNIFICANT CHANGE UP (ref 3.5–5.3)
POTASSIUM SERPL-SCNC: 3.8 MMOL/L — SIGNIFICANT CHANGE UP (ref 3.5–5.3)
SODIUM SERPL-SCNC: 136 MMOL/L — SIGNIFICANT CHANGE UP (ref 135–145)

## 2023-10-16 PROCEDURE — 81001 URINALYSIS AUTO W/SCOPE: CPT

## 2023-10-16 PROCEDURE — 93306 TTE W/DOPPLER COMPLETE: CPT

## 2023-10-16 PROCEDURE — 86850 RBC ANTIBODY SCREEN: CPT

## 2023-10-16 PROCEDURE — 85610 PROTHROMBIN TIME: CPT

## 2023-10-16 PROCEDURE — 93990 DOPPLER FLOW TESTING: CPT

## 2023-10-16 PROCEDURE — 83880 ASSAY OF NATRIURETIC PEPTIDE: CPT

## 2023-10-16 PROCEDURE — 93325 DOPPLER ECHO COLOR FLOW MAPG: CPT

## 2023-10-16 PROCEDURE — 87045 FECES CULTURE AEROBIC BACT: CPT

## 2023-10-16 PROCEDURE — 74175 CTA ABDOMEN W/CONTRAST: CPT

## 2023-10-16 PROCEDURE — 83690 ASSAY OF LIPASE: CPT

## 2023-10-16 PROCEDURE — 83605 ASSAY OF LACTIC ACID: CPT

## 2023-10-16 PROCEDURE — 80202 ASSAY OF VANCOMYCIN: CPT

## 2023-10-16 PROCEDURE — 82436 ASSAY OF URINE CHLORIDE: CPT

## 2023-10-16 PROCEDURE — 80053 COMPREHEN METABOLIC PANEL: CPT

## 2023-10-16 PROCEDURE — 36415 COLL VENOUS BLD VENIPUNCTURE: CPT

## 2023-10-16 PROCEDURE — 99231 SBSQ HOSP IP/OBS SF/LOW 25: CPT

## 2023-10-16 PROCEDURE — 74176 CT ABD & PELVIS W/O CONTRAST: CPT | Mod: MA

## 2023-10-16 PROCEDURE — 85025 COMPLETE CBC W/AUTO DIFF WBC: CPT

## 2023-10-16 PROCEDURE — 82962 GLUCOSE BLOOD TEST: CPT

## 2023-10-16 PROCEDURE — 85730 THROMBOPLASTIN TIME PARTIAL: CPT

## 2023-10-16 PROCEDURE — 87086 URINE CULTURE/COLONY COUNT: CPT

## 2023-10-16 PROCEDURE — 93005 ELECTROCARDIOGRAM TRACING: CPT

## 2023-10-16 PROCEDURE — 86922 COMPATIBILITY TEST ANTIGLOB: CPT

## 2023-10-16 PROCEDURE — 87186 SC STD MICRODIL/AGAR DIL: CPT

## 2023-10-16 PROCEDURE — 80307 DRUG TEST PRSMV CHEM ANLYZR: CPT

## 2023-10-16 PROCEDURE — 88300 SURGICAL PATH GROSS: CPT

## 2023-10-16 PROCEDURE — 87449 NOS EACH ORGANISM AG IA: CPT

## 2023-10-16 PROCEDURE — C1889: CPT

## 2023-10-16 PROCEDURE — C9399: CPT

## 2023-10-16 PROCEDURE — 70450 CT HEAD/BRAIN W/O DYE: CPT

## 2023-10-16 PROCEDURE — 87040 BLOOD CULTURE FOR BACTERIA: CPT

## 2023-10-16 PROCEDURE — 86900 BLOOD TYPING SEROLOGIC ABO: CPT

## 2023-10-16 PROCEDURE — 87077 CULTURE AEROBIC IDENTIFY: CPT

## 2023-10-16 PROCEDURE — 84484 ASSAY OF TROPONIN QUANT: CPT

## 2023-10-16 PROCEDURE — 83935 ASSAY OF URINE OSMOLALITY: CPT

## 2023-10-16 PROCEDURE — 87150 DNA/RNA AMPLIFIED PROBE: CPT

## 2023-10-16 PROCEDURE — 70486 CT MAXILLOFACIAL W/O DYE: CPT

## 2023-10-16 PROCEDURE — 86901 BLOOD TYPING SEROLOGIC RH(D): CPT

## 2023-10-16 PROCEDURE — 99239 HOSP IP/OBS DSCHRG MGMT >30: CPT

## 2023-10-16 PROCEDURE — 99285 EMERGENCY DEPT VISIT HI MDM: CPT

## 2023-10-16 PROCEDURE — 93320 DOPPLER ECHO COMPLETE: CPT

## 2023-10-16 PROCEDURE — 87507 IADNA-DNA/RNA PROBE TQ 12-25: CPT

## 2023-10-16 PROCEDURE — 84300 ASSAY OF URINE SODIUM: CPT

## 2023-10-16 PROCEDURE — 85027 COMPLETE CBC AUTOMATED: CPT

## 2023-10-16 PROCEDURE — 96374 THER/PROPH/DIAG INJ IV PUSH: CPT

## 2023-10-16 PROCEDURE — 83735 ASSAY OF MAGNESIUM: CPT

## 2023-10-16 PROCEDURE — 74018 RADEX ABDOMEN 1 VIEW: CPT

## 2023-10-16 PROCEDURE — 74177 CT ABD & PELVIS W/CONTRAST: CPT

## 2023-10-16 PROCEDURE — 82550 ASSAY OF CK (CPK): CPT

## 2023-10-16 PROCEDURE — 0225U NFCT DS DNA&RNA 21 SARSCOV2: CPT

## 2023-10-16 PROCEDURE — 87046 STOOL CULTR AEROBIC BACT EA: CPT

## 2023-10-16 PROCEDURE — 87075 CULTR BACTERIA EXCEPT BLOOD: CPT

## 2023-10-16 PROCEDURE — 96375 TX/PRO/DX INJ NEW DRUG ADDON: CPT

## 2023-10-16 PROCEDURE — 93312 ECHO TRANSESOPHAGEAL: CPT

## 2023-10-16 PROCEDURE — 80048 BASIC METABOLIC PNL TOTAL CA: CPT

## 2023-10-16 PROCEDURE — 84145 PROCALCITONIN (PCT): CPT

## 2023-10-16 PROCEDURE — 87205 SMEAR GRAM STAIN: CPT

## 2023-10-16 PROCEDURE — 84133 ASSAY OF URINE POTASSIUM: CPT

## 2023-10-16 PROCEDURE — 71045 X-RAY EXAM CHEST 1 VIEW: CPT

## 2023-10-16 PROCEDURE — 84100 ASSAY OF PHOSPHORUS: CPT

## 2023-10-16 PROCEDURE — 87070 CULTURE OTHR SPECIMN AEROBIC: CPT

## 2023-10-16 RX ORDER — CEFAZOLIN SODIUM 1 G
2 VIAL (EA) INJECTION
Qty: 0 | Refills: 0 | DISCHARGE
Start: 2023-10-16

## 2023-10-16 RX ORDER — ACETAMINOPHEN 500 MG
2 TABLET ORAL
Qty: 0 | Refills: 0 | DISCHARGE
Start: 2023-10-16

## 2023-10-16 RX ORDER — APIXABAN 2.5 MG/1
1 TABLET, FILM COATED ORAL
Qty: 0 | Refills: 0 | DISCHARGE
Start: 2023-10-16

## 2023-10-16 RX ORDER — AMLODIPINE BESYLATE 2.5 MG/1
1 TABLET ORAL
Qty: 0 | Refills: 0 | DISCHARGE
Start: 2023-10-16

## 2023-10-16 RX ORDER — HYDROMORPHONE HYDROCHLORIDE 2 MG/ML
1 INJECTION INTRAMUSCULAR; INTRAVENOUS; SUBCUTANEOUS
Qty: 0 | Refills: 0 | DISCHARGE
Start: 2023-10-16

## 2023-10-16 RX ORDER — MAGNESIUM OXIDE 400 MG ORAL TABLET 241.3 MG
1 TABLET ORAL
Qty: 0 | Refills: 0 | DISCHARGE
Start: 2023-10-16

## 2023-10-16 RX ORDER — CARVEDILOL PHOSPHATE 80 MG/1
1 CAPSULE, EXTENDED RELEASE ORAL
Refills: 0 | DISCHARGE

## 2023-10-16 RX ORDER — ATORVASTATIN CALCIUM 80 MG/1
1 TABLET, FILM COATED ORAL
Refills: 0 | DISCHARGE

## 2023-10-16 RX ORDER — POTASSIUM CHLORIDE 20 MEQ
1 PACKET (EA) ORAL
Qty: 0 | Refills: 0 | DISCHARGE
Start: 2023-10-16

## 2023-10-16 RX ORDER — CARVEDILOL PHOSPHATE 80 MG/1
0.5 CAPSULE, EXTENDED RELEASE ORAL
Qty: 0 | Refills: 0 | DISCHARGE
Start: 2023-10-16

## 2023-10-16 RX ORDER — POLYETHYLENE GLYCOL 3350 17 G/17G
17 POWDER, FOR SOLUTION ORAL
Qty: 0 | Refills: 0 | DISCHARGE
Start: 2023-10-16

## 2023-10-16 RX ORDER — APIXABAN 2.5 MG/1
1 TABLET, FILM COATED ORAL
Refills: 0 | DISCHARGE

## 2023-10-16 RX ORDER — SENNA PLUS 8.6 MG/1
2 TABLET ORAL
Qty: 0 | Refills: 0 | DISCHARGE
Start: 2023-10-16

## 2023-10-16 RX ADMIN — Medication 20 MILLIEQUIVALENT(S): at 11:28

## 2023-10-16 RX ADMIN — CARVEDILOL PHOSPHATE 6.25 MILLIGRAM(S): 80 CAPSULE, EXTENDED RELEASE ORAL at 05:54

## 2023-10-16 RX ADMIN — APIXABAN 5 MILLIGRAM(S): 2.5 TABLET, FILM COATED ORAL at 06:06

## 2023-10-16 RX ADMIN — HYDROMORPHONE HYDROCHLORIDE 2 MILLIGRAM(S): 2 INJECTION INTRAMUSCULAR; INTRAVENOUS; SUBCUTANEOUS at 04:35

## 2023-10-16 RX ADMIN — LEVETIRACETAM 1000 MILLIGRAM(S): 250 TABLET, FILM COATED ORAL at 05:54

## 2023-10-16 RX ADMIN — AMLODIPINE BESYLATE 10 MILLIGRAM(S): 2.5 TABLET ORAL at 05:54

## 2023-10-16 RX ADMIN — Medication 2000 MILLIGRAM(S): at 05:53

## 2023-10-16 RX ADMIN — Medication 20 MILLIGRAM(S): at 11:28

## 2023-10-16 RX ADMIN — MAGNESIUM OXIDE 400 MG ORAL TABLET 400 MILLIGRAM(S): 241.3 TABLET ORAL at 11:28

## 2023-10-16 RX ADMIN — Medication 1 PATCH: at 07:48

## 2023-10-16 RX ADMIN — Medication 2000 MILLIGRAM(S): at 13:41

## 2023-10-16 RX ADMIN — HYDROMORPHONE HYDROCHLORIDE 2 MILLIGRAM(S): 2 INJECTION INTRAMUSCULAR; INTRAVENOUS; SUBCUTANEOUS at 04:06

## 2023-10-16 NOTE — DISCHARGE NOTE PROVIDER - NSDCFUADDAPPT_GEN_ALL_CORE_FT
weekly cbc, cmp fax to [phone]. f/u ID 7-10days.     f/u nephrology. f/u CTSx 1 month, you will need repeat MUSTAPHA. f/u urology for R kidney lesion.

## 2023-10-16 NOTE — DISCHARGE NOTE PROVIDER - NSDCFUSCHEDAPPT_GEN_ALL_CORE_FT
James Trevino  [facility]  Scheduled Appointment: 10/17/2023    James Trevino  [facility]  Scheduled Appointment: 10/23/2023

## 2023-10-16 NOTE — PROGRESS NOTE ADULT - REASON FOR ADMISSION
Intractable N/V, SANTOS

## 2023-10-16 NOTE — DISCHARGE NOTE NURSING/CASE MANAGEMENT/SOCIAL WORK - NSDCVIVACCINE_GEN_ALL_CORE_FT
influenza, injectable, quadrivalent, preservative free; 12-Dec-2020 09:28; Lynn Gerardo); Asset Mapping; 724k2 (Exp. Date: 30-Jun-2021); IntraMuscular; Deltoid Right.; 0.5 milliLiter(s); VIS (VIS Published: 15-Aug-2019, VIS Presented: 12-Dec-2020);   
Yes

## 2023-10-16 NOTE — PROGRESS NOTE ADULT - SUBJECTIVE AND OBJECTIVE BOX
[facility]  INFECTIOUS DISEASES at [place] / [place] / [place]  =======================================================                               Roosevelt Victor MD#   Fabián Quigley MD*                             Valentine Handley MD*   Reba Catalan MD*                              Professor Emeritus:  Dr Johnny Hathaway MD^            Diplomates American Board of Internal Medicine & Infectious Diseases                # [place] Office - Appt - Tel  [phone] Fax [phone]                * [place] Office - Appt - Tel [phone] Fax [phone]                      ^[place] Office - Tel  [phone] Fax [phone]                                  Hospital Consult line:  [phone]  =======================================================    JONATHAN GIBSON [id number]    Follow up: MV Endocarditis     s/p Left AV graft removal 10/1/23    no fevers   Tolerating liquid diet     Allergies:  penicillin (Other)       REVIEW OF SYSTEMS:  CONSTITUTIONAL:  No Fever or chills  HEENT:  No diplopia or blurred vision.  No earache, sore throat or runny nose.  CARDIOVASCULAR:  No chest pain  RESPIRATORY:  No cough, shortness of breath  GASTROINTESTINAL:  No nausea or vomiting.  GENITOURINARY:  No dysuria, frequency or urgency. No Blood in urine  MUSCULOSKELETAL:  no joint aches, no muscle pain  SKIN:  No change in skin, hair or nails.  NEUROLOGIC:  No Headaches      Physical Exam:  GEN: NAD  HEENT: normocephalic and atraumatic. EOMI. PERRL.    NECK: Supple.   LUNGS: CTA B/L.  HEART: RRR  ABDOMEN: Soft, NT.  +BS.  Non-distended   : No CVA tenderness  EXTREMITIES: Without  edema.  MSK: No joint swelling  NEUROLOGIC: No Focal Deficits       Vitals:  T(F): 98.6 (16 Oct 2023 11:27), Max: 98.6 (16 Oct 2023 11:27)  HR: 77 (16 Oct 2023 11:27)  BP: 108/60 (16 Oct 2023 11:27)  RR: 17 (16 Oct 2023 11:27)  SpO2: 99% (16 Oct 2023 11:27) (94% - 99%)  temp max in last 48H T(F): , Max: 98.7 (10-15-23 @ 11:53)    Current Antibiotics:  ceFAZolin  Injectable. 2000 milliGRAM(s) IV Push every 8 hours    Other medications:  amLODIPine   Tablet 10 milliGRAM(s) Oral daily  apixaban 5 milliGRAM(s) Oral every 12 hours  carvedilol 6.25 milliGRAM(s) Oral every 12 hours  cloNIDine Patch 0.1 mG/24Hr(s) 1 patch Transdermal every 7 days  levETIRAcetam 1000 milliGRAM(s) Oral two times a day  polyethylene glycol 3350 17 Gram(s) Oral daily  potassium chloride   Powder 20 milliEquivalent(s) Oral daily  senna 2 Tablet(s) Oral at bedtime  tamsulosin 0.4 milliGRAM(s) Oral at bedtime  torsemide 20 milliGRAM(s) Oral daily                 9.8    9.99  )-----------( 155      ( 15 Oct 2023 09:06 )             30.6     10-16    136  |  99  |  34.1<H>  ----------------------------<  101<H>  3.8   |  25.0  |  1.77<H>    Ca    8.6      16 Oct 2023 05:06  Phos  2.6     10-16  Mg     1.8     10-16      RECENT CULTURES:      WBC Count: 9.99 K/uL (10-15-23 @ 09:06)  WBC Count: 9.91 K/uL (10-14-23 @ 06:56)    Creatinine: 1.77 mg/dL (10-16-23 @ 05:06)  Creatinine: 1.69 mg/dL (10-15-23 @ 09:06)  Creatinine: 1.91 mg/dL (10-15-23 @ 00:54)  Creatinine: 1.72 mg/dL (10-14-23 @ 06:56)    SARS-CoV-2: NotDetec (09-25-23 @ 06:00)            < from: MUSTAPHA Echo Doppler (09.27.23 @ 10:59) >  Summary:   1. Left ventricular ejection fraction, by visual estimation, is 55 to 60%.   2. Severely enlarged left atrium.   3. Mild mitral valve regurgitation.   4. Mitral prosthesis vegetation.   5. There is a bioprosthetic valve in the mitral position with thickened   leaflets yet leaflet mobility is fairly preserved. There is trace to mild   valvular regurgitation with no perivalvular leaks. No rocking motion or   dehiscence noted. There is a large and mobile vegetation measuring   1.8x1.0 cm attached to the atrial surface of the medial bioprosthetic   leaflet. There is evidence of at least mild valve stenosis with a mean   transvalvular gradient of 5 mmHg at a heart rate of 60 bpm. The mitral   valve area by 3D is 1.57 cm2.   6. Mild tricuspid regurgitation.   7. Bioprosthesis in the aortic position.   8. Dilatation of the aortic root.    < end of copied text >        < from: TTE Echo Complete w/o Contrast w/ Doppler (09.26.23 @ 14:32) >  Summary:   1. Left ventricular ejection fraction, by visual estimation, is 50 to   55%.   2. Normal global left ventricular systolic function.   3. Diastolic function indeterminate.   4. Abnormal septal motion consistent with post-operative status.   5. Normal RV size with mildly reduced RV systolic function, inadequate   estimation of RVSP.   6. Bioprosthetic mitral valve present with a large mobile lesion (at   least 2.0 cm x 1.5 cm) prolapsing into and out of the LV-LA cavity,   hightly suggestive of vegetation. Mean transmitral valve gradient 4.8   mmHg (at HR of 60 bpm).   7. Bioprosthesis in the aortic position, mean gradient of 9 mmHg.   8. There is no evidence of pericardial effusion.   9. Compared to the prior TTE study from 5/23/23, lesion over bioMVR is   again noted and is now much larger and recommend MUSTAPHA study to confirm   vegetation and exclude other valvular involvement.  < end of copied text >

## 2023-10-16 NOTE — PROGRESS NOTE ADULT - NUTRITIONAL ASSESSMENT
This patient has been assessed with a concern for Malnutrition and has been determined to have a diagnosis/diagnoses of Moderate protein-calorie malnutrition.    This patient is being managed with:   Diet Clear Liquid-  Entered: Oct  9 2023  9:43AM  
This patient has been assessed with a concern for Malnutrition and has been determined to have a diagnosis/diagnoses of Moderate protein-calorie malnutrition.    This patient is being managed with:   Diet NPO-  Entered: Oct  3 2023  5:07AM  
This patient has been assessed with a concern for Malnutrition and has been determined to have a diagnosis/diagnoses of Moderate protein-calorie malnutrition.    This patient is being managed with:   Diet Regular-  Entered: Oct 11 2023  9:20AM  
This patient has been assessed with a concern for Malnutrition and has been determined to have a diagnosis/diagnoses of Moderate protein-calorie malnutrition.    This patient is being managed with:   Diet DASH/TLC-  Sodium & Cholesterol Restricted  Entered: Sep 25 2023  3:15PM  
This patient has been assessed with a concern for Malnutrition and has been determined to have a diagnosis/diagnoses of Moderate protein-calorie malnutrition.    This patient is being managed with:   Diet NPO-  Entered: Oct  3 2023  5:07AM  
This patient has been assessed with a concern for Malnutrition and has been determined to have a diagnosis/diagnoses of Moderate protein-calorie malnutrition.    This patient is being managed with:   Diet NPO-  Entered: Oct  3 2023  5:07AM  
This patient has been assessed with a concern for Malnutrition and has been determined to have a diagnosis/diagnoses of Moderate protein-calorie malnutrition.    This patient is being managed with:   Diet DASH/TLC-  Sodium & Cholesterol Restricted  Entered: Sep 25 2023  3:15PM  
This patient has been assessed with a concern for Malnutrition and has been determined to have a diagnosis/diagnoses of Moderate protein-calorie malnutrition.    This patient is being managed with:   Diet DASH/TLC-  Sodium & Cholesterol Restricted  Entered: Sep 25 2023  3:15PM  
This patient has been assessed with a concern for Malnutrition and has been determined to have a diagnosis/diagnoses of Moderate protein-calorie malnutrition.    This patient is being managed with:   Diet Full Liquid-  Entered: Oct 10 2023 11:50AM  
This patient has been assessed with a concern for Malnutrition and has been determined to have a diagnosis/diagnoses of Moderate protein-calorie malnutrition.    This patient is being managed with:   Diet Full Liquid-  Entered: Oct 10 2023 11:50AM  
This patient has been assessed with a concern for Malnutrition and has been determined to have a diagnosis/diagnoses of Moderate protein-calorie malnutrition.    This patient is being managed with:   Diet NPO-  Entered: Oct  3 2023  5:07AM  
This patient has been assessed with a concern for Malnutrition and has been determined to have a diagnosis/diagnoses of Moderate protein-calorie malnutrition.    This patient is being managed with:   Diet Regular-  Entered: Oct 11 2023  9:20AM  
This patient has been assessed with a concern for Malnutrition and has been determined to have a diagnosis/diagnoses of Moderate protein-calorie malnutrition.    This patient is being managed with:   Diet Regular-  Entered: Oct 11 2023  9:20AM  
This patient has been assessed with a concern for Malnutrition and has been determined to have a diagnosis/diagnoses of Moderate protein-calorie malnutrition.  
This patient has been assessed with a concern for Malnutrition and has been determined to have a diagnosis/diagnoses of Moderate protein-calorie malnutrition.    This patient is being managed with:   Diet Clear Liquid-  1500mL Fluid Restriction ([id number])  Entered: Sep 23 2023  5:13PM  
This patient has been assessed with a concern for Malnutrition and has been determined to have a diagnosis/diagnoses of Moderate protein-calorie malnutrition.    This patient is being managed with:   Diet DASH/TLC-  Sodium & Cholesterol Restricted  Entered: Oct  1 2023 12:56PM  
This patient has been assessed with a concern for Malnutrition and has been determined to have a diagnosis/diagnoses of Moderate protein-calorie malnutrition.    This patient is being managed with:   Diet DASH/TLC-  Sodium & Cholesterol Restricted  Entered: Oct  1 2023 12:56PM  
This patient has been assessed with a concern for Malnutrition and has been determined to have a diagnosis/diagnoses of Moderate protein-calorie malnutrition.    This patient is being managed with:   Diet NPO-  Entered: Oct  3 2023  5:07AM  
This patient has been assessed with a concern for Malnutrition and has been determined to have a diagnosis/diagnoses of Moderate protein-calorie malnutrition.    This patient is being managed with:   Diet Regular-  Entered: Oct 11 2023  9:20AM  
This patient has been assessed with a concern for Malnutrition and has been determined to have a diagnosis/diagnoses of Moderate protein-calorie malnutrition.    This patient is being managed with:   Diet Clear Liquid-  Entered: Oct  9 2023  9:43AM  
This patient has been assessed with a concern for Malnutrition and has been determined to have a diagnosis/diagnoses of Moderate protein-calorie malnutrition.    This patient is being managed with:   Diet DASH/TLC-  Sodium & Cholesterol Restricted  Entered: Sep 25 2023  3:15PM  
This patient has been assessed with a concern for Malnutrition and has been determined to have a diagnosis/diagnoses of Moderate protein-calorie malnutrition.    This patient is being managed with:   Diet DASH/TLC-  Sodium & Cholesterol Restricted  Entered: Sep 25 2023  3:15PM  
This patient has been assessed with a concern for Malnutrition and has been determined to have a diagnosis/diagnoses of Moderate protein-calorie malnutrition.    This patient is being managed with:   Diet NPO after Midnight-     NPO Start Date: 30-Sep-2023   NPO Start Time: 23:59  Entered: Sep 30 2023  3:42PM    Diet DASH/TLC-  Sodium & Cholesterol Restricted  Entered: Sep 25 2023  3:15PM  
This patient has been assessed with a concern for Malnutrition and has been determined to have a diagnosis/diagnoses of Moderate protein-calorie malnutrition.    This patient is being managed with:   Diet NPO-  Entered: Oct  3 2023  5:07AM  
This patient has been assessed with a concern for Malnutrition and has been determined to have a diagnosis/diagnoses of Moderate protein-calorie malnutrition.    This patient is being managed with:   Diet Regular-  Entered: Oct 11 2023  9:20AM  
This patient has been assessed with a concern for Malnutrition and has been determined to have a diagnosis/diagnoses of Moderate protein-calorie malnutrition.    This patient is being managed with:   Diet Regular-  Entered: Oct 11 2023  9:20AM  
This patient has been assessed with a concern for Malnutrition and has been determined to have a diagnosis/diagnoses of Moderate protein-calorie malnutrition.    This patient is being managed with:   Diet DASH/TLC-  Sodium & Cholesterol Restricted  Entered: Oct  1 2023 12:56PM

## 2023-10-16 NOTE — PROGRESS NOTE ADULT - ASSESSMENT
63y/oM PMH HTN, HLD, type A aortic dissection w/bowel ischemia requiring bowel resection and ostomy with reversal, w/repeat sternotomy and CABG x2, CHF (mixed systolic, diastolic), MV and AV replacement, CKD III (on short term HD 2021), AF s/p ablation pending Watchman, SDH (resolved as of 7/2023 imaging) presenting to [facility] with 2 weeks inability to tolerate PO. Initially admitted with SANTOS on CKD. Hospital course complicated by staph epidermidis bacteremia and TTE with large MV endocarditis. Also with concern for infected L AV graft, now s/p graft removal. Course further complicated by ileus vs SBO, now resolved.     Sepsis, POA 2/2 staph epidermidis bacteremia   MV Infective endocarditis  -s/p L AV graft removal (10/1)  -s/p MUSTAPHA 9/27: +large mobile vegetation 1.8 x 1.0cm on bioprosthetic MV  -CTSx recs appreciated  -plan to tx pt with 4 weeks IV abx and repeat MUSTAPHA at that time given pt is high risk for surgery, deferring further contrast studies due to high risk of progressing to HD again   -ID recs appreciated   -midline placed 10/13  -cont abx as per ID     Ileus vs SBO   -resolved, tolerating diet     SANTOS on CKD   -nephro recs appreciated   -baseline sCr 1.5-1.8  -hx of temporary HD (ended 11/2021)    Hypokalemia   -repleted, daily supplement added as per nephro     Chronic systolic and diastolic HFpEF   paroxysmal afib   HTN  -MUSTAPHA LVEF 55-60%  -cardio recs appreciated   -cont amlodipine, coreg, clonidine, torsemide   -cont eliquis     Hx SDH   -CTH with resolution since 7/2023  -cont keppra     R kidney lesion   -CTA abd: 1.9x1.7cm heterogeneous lesion in posterior upper pole, suggestive of low-grade cystic RCC  -f/u urology outpt     VTE ppx: eliquis     dispo: [facility] pending placement

## 2023-10-16 NOTE — DISCHARGE NOTE PROVIDER - CARE PROVIDER_API CALL
Mike Monaco  Nephrology  [address], [place] [address]  Phone: [phone]  Fax: [phone]  Follow Up Time:     Fabián Quigley  Infectious Disease  [address] [place] [address]  Phone: [phone]  Fax: [phone]  Follow Up Time: 1 week    Javy Butler Jr.  Thoracic and Cardiac Surgery  [address] [place] [address]  Phone: [phone]  Fax: [phone]  Follow Up Time: 1 month    Justin Benson  Urology  [address], [address] [place] [address]  [place] [address]  Phone: [phone]  Fax: [phone]  Follow Up Time:

## 2023-10-16 NOTE — DISCHARGE NOTE NURSING/CASE MANAGEMENT/SOCIAL WORK - NSDCPEFALRISK_GEN_ALL_CORE
For information on Fall & Injury Prevention, visit: [email or web address] OR  [email or web address] OR  https://www.cdc.gov/steadi/patient.html

## 2023-10-16 NOTE — DISCHARGE NOTE PROVIDER - CARE PROVIDERS DIRECT ADDRESSES
,DirectAddress_Unknown,[email or web address],DirectAddress_Unknown,DirectAddress_Unknown

## 2023-10-16 NOTE — DISCHARGE NOTE NURSING/CASE MANAGEMENT/SOCIAL WORK - PATIENT PORTAL LINK FT
You can access the FollowMyHealth Patient Portal offered by [facility] by registering at the following website: [email or web address]. By joining [facility]’s FollowMyHealth portal, you will also be able to view your health information using other applications (apps) compatible with our system.

## 2023-10-16 NOTE — DISCHARGE NOTE PROVIDER - HOSPITAL COURSE
63y/oM PMH HTN, HLD, type A aortic dissection w/bowel ischemia requiring bowel resection and ostomy with reversal, w/repeat sternotomy and CABG x2, CHF (mixed systolic, diastolic), MV and AV replacement, CKD III (on short term HD 2021), AF s/p ablation pending Watchman, SDH (resolved as of 7/2023 imaging) presenting to [facility] with 2 weeks inability to tolerate PO. Initially admitted with SANTOS on CKD. Hospital course complicated by staph epidermidis bacteremia and TTE with large MV endocarditis. MUSTAPHA 9/27 with large mobile vegetation on bioprosthetic MV 1.8x 1.0cm. Also with concern for infected L AV graft, now s/p graft removal (10/1). Course further complicated by ileus vs SBO, now resolved. Plan to treat with 4 weeks IV abx, repeat MUSTAPHA at that time given pt is high risk for surgery. pt to cont cefazolin until tentatively 11/8/23. weekly cbc, cmp faxed to [phone]; f/u ID 7-10days. Midline placed 10/13. Pt also with R kidney lesion seen on CTA abd: 1.9 x1.7cm heterogenous lesion in posterior upper pole suggestive of low-grade cystic RCC, pt to f/u urology outpt. Pt to dc to [facility]

## 2023-10-16 NOTE — DISCHARGE NOTE PROVIDER - NSDCCPCAREPLAN_GEN_ALL_CORE_FT
PRINCIPAL DISCHARGE DIAGNOSIS  Diagnosis: Acute kidney injury superimposed on CKD  Assessment and Plan of Treatment: f/u nephrology      SECONDARY DISCHARGE DIAGNOSES  Diagnosis: Bacteremia  Assessment and Plan of Treatment:     Diagnosis: S/P aortic valve and mitral valve replacement  Assessment and Plan of Treatment:     Diagnosis: Bacterial endocarditis  Assessment and Plan of Treatment: continue abx as per ID. f/u ID 7-10 days. 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

## 2023-10-16 NOTE — DISCHARGE NOTE PROVIDER - DETAILS OF MALNUTRITION DIAGNOSIS/DIAGNOSES
This patient has been assessed with a concern for Malnutrition and was treated during this hospitalization for the following Nutrition diagnosis/diagnoses:     -  09/25/2023: Moderate protein-calorie malnutrition

## 2023-10-16 NOTE — DISCHARGE NOTE NURSING/CASE MANAGEMENT/SOCIAL WORK - NSDCFUADDAPPT_GEN_ALL_CORE_FT
weekly cbc, cmp fax to [phone]. f/u ID 7-10days.     f/u nephrology. f/u CTSx 1 month, you will need repeat MUSTAPHA. f/u urology for R kidney lesion.

## 2023-10-16 NOTE — PROGRESS NOTE ADULT - PROVIDER SPECIALTY LIST ADULT
Hospitalist
Infectious Disease
Infectious Disease
Intervent Cardiology
Nephrology
Surgery
Surgery
Vascular Surgery
Vascular Surgery
Hospitalist
Infectious Disease
Nephrology
Surgery
Surgery
Vascular Surgery
Vascular Surgery
CT Surgery
Hospitalist
Hospitalist
Infectious Disease
Nephrology
Surgery
Vascular Surgery
Hospitalist
Infectious Disease
Nephrology
Surgery
Vascular Surgery
Cardiology
Hospitalist
Infectious Disease
SICU
Surgery
Vascular Surgery
Vascular Surgery
Cardiology
Infectious Disease
Cardiology
CT Surgery
CT Surgery

## 2023-10-16 NOTE — DISCHARGE NOTE PROVIDER - PROVIDER TOKENS
PROVIDER:[TOKEN:[[id number]]],PROVIDER:[TOKEN:[[id number]],FOLLOWUP:[1 week]],PROVIDER:[TOKEN:[[id number]],FOLLOWUP:[1 month]],PROVIDER:[TOKEN:[[id number]]]

## 2023-10-16 NOTE — PROGRESS NOTE ADULT - ASSESSMENT
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 Patient presented to [facility] for 2 weeks of inability to tolerate oral intake. Denies any fever or chills. Has chronic diarrhea due to bowel resection. In the ER patient was afebrile. Found to have leukocytosis to 23k. Patient was found to have SANTOS. Had a febrile episode to 102F on 9/25. Started on Vancomycin and Meropenem. Blood cultures 1 of 4 bottles with Staphylococcus epidermidis      Prosthetic MV endocarditis   Staphylococcus epidermidis bacteremia  Infected thrombosed Graft   s/p Left AV graft removal 10/1/23  Fever  Leukocytosis   SANTOS   PCN allergy   Ileus       - Blood cultures 9/23, 9/25 reporting Staphylococcus epidermidis  - Repeat blood cultures  9/27 no growth   - RVP/COVID 19 PCR 9/25 negative   - CT A/P 9/23 reporting no acute findings   - CXR with ileus on 10/3  - TTE reporting MV veg  - MUSTAPHA reporting large mobile density MV  - UA 9/25 negative for UTI   - PCN allergy but has tolerated Zosyn and Zinacef in the past.   - Vascular surgery following for thrombosed graft. s/p Left AV graft removal 10/1/23  - Continue Cefazolin   - Due to renal failure will hold off on Gentamicin  - Due to Eliquis unable to use Rifampin   - Plan for Repeat MUSTAPHA on or after 10/24/23, d/w cardiology   - Tentative end date for Cefazolin will be 11/8/23, may need to extend based on MUSTAPHA or switch to oral suppressive antibiotics   - Discussed and explained midline procedure to the patient in detail, agreeable to midline   - Will need weekly CBC and CMP faxed to [phone]  - Appointment with us in 7 to 10 days after D/C - [phone]  - Follow up cultures  - Trend Fever  - Trend WBC      Will Follow    Discussed treatment plan with: Dr Arenas

## 2023-10-16 NOTE — DISCHARGE NOTE PROVIDER - NSDCMRMEDTOKEN_GEN_ALL_CORE_FT
acetaminophen 325 mg oral tablet: 2 tab(s) orally every 6 hours As needed Temp greater or equal to 38C (100.4F), Mild Pain (1 - 3)  aluminum hydroxide-magnesium hydroxide 200 mg-200 mg/5 mL oral suspension: 30 milliliter(s) orally every 4 hours As needed Dyspepsia  amLODIPine 10 mg oral tablet: 1 tab(s) orally once a day  atorvastatin 40 mg oral tablet: 1 orally once a day  carvedilol 6.25 mg oral tablet: 1 tab(s) orally every 12 hours  cloNIDine 0.1 mg/24 hr transdermal film, extended release: 1 patch transdermal every 7 days  Eliquis 5 mg oral tablet: 1 orally once a day  levETIRAcetam 1000 mg oral tablet: 1 tab(s) orally 2 times a day  magnesium oxide 400 mg oral tablet: 1 tab(s) orally 2 times a day (with meals)  oyster shell 500mg BID:   polyethylene glycol 3350 oral powder for reconstitution: 17 gram(s) orally once a day  potassium chloride 20 mEq oral powder for reconstitution: 1 packet(s) orally once a day  senna leaf extract oral tablet: 2 tab(s) orally once a day (at bedtime)  tamsulosin 0.4 mg oral capsule: 1 cap(s) orally once a day (at bedtime)  torsemide 20 mg oral tablet: 1 tab(s) orally once a day   acetaminophen 325 mg oral tablet: 2 tab(s) orally every 6 hours As needed Temp greater or equal to 38C (100.4F), Mild Pain (1 - 3)  aluminum hydroxide-magnesium hydroxide 200 mg-200 mg/5 mL oral suspension: 30 milliliter(s) orally every 4 hours As needed Dyspepsia  amLODIPine 10 mg oral tablet: 1 tab(s) orally once a day  apixaban 5 mg oral tablet: 1 tab(s) orally every 12 hours  carvedilol 6.25 mg oral tablet: 1 tab(s) orally every 12 hours  ceFAZolin 2 g intravenous injection: 2 gram(s) intravenous every 8 hours  cloNIDine 0.1 mg/24 hr transdermal film, extended release: 1 patch transdermal every 7 days  HYDROmorphone 2 mg oral tablet: 1 tab(s) orally every 6 hours As needed Severe Pain (7 - 10)  levETIRAcetam 1000 mg oral tablet: 1 tab(s) orally 2 times a day  magnesium oxide 400 mg oral tablet: 1 tab(s) orally 2 times a day (with meals)  oyster shell 500mg BID:   polyethylene glycol 3350 oral powder for reconstitution: 17 gram(s) orally once a day  potassium chloride 20 mEq oral powder for reconstitution: 1 packet(s) orally once a day  senna leaf extract oral tablet: 2 tab(s) orally once a day (at bedtime)  tamsulosin 0.4 mg oral capsule: 1 cap(s) orally once a day (at bedtime)  torsemide 20 mg oral tablet: 1 tab(s) orally once a day

## 2023-10-16 NOTE — PROGRESS NOTE ADULT - SUBJECTIVE AND OBJECTIVE BOX
JONATHAN GIBSON    [id number]    63y      Male    CC: eric    INTERVAL HPI/OVERNIGHT EVENTS: pt seen and examined. no acute events reported o/n     REVIEW OF SYSTEMS:    CONSTITUTIONAL: No fever, weight loss  RESPIRATORY: No cough, wheezing, hemoptysis; No shortness of breath  CARDIOVASCULAR: No chest pain, palpitations  GASTROINTESTINAL: No abdominal or epigastric pain. No nausea, vomiting  NEUROLOGICAL: No headache    Vital Signs Last 24 Hrs  T(C): 36.9 (16 Oct 2023 08:31), Max: 37.1 (15 Oct 2023 11:53)  T(F): 98.4 (16 Oct 2023 08:31), Max: 98.7 (15 Oct 2023 11:53)  HR: 68 (16 Oct 2023 08:31) (60 - 84)  BP: 100/60 (16 Oct 2023 08:31) (100/57 - 118/63)  BP(mean): --  RR: 17 (16 Oct 2023 08:31) (17 - 18)  SpO2: 94% (16 Oct 2023 08:31) (94% - 98%)    Parameters below as of 16 Oct 2023 08:31  Patient On (Oxygen Delivery Method): room air        PHYSICAL EXAM:    GENERAL: NAD  HEENT: PERRL, +EOMI  CHEST/LUNG: Clear to auscultation bilaterally  HEART: S1S2+, Regular rate and rhythm  ABDOMEN: Soft, Nontender, Nondistended; Bowel sounds present  SKIN: warm, dry  NEURO: Awake, alert   PSYCH: calm, cooperative    LABS:                        9.8    9.99  )-----------( 155      ( 15 Oct 2023 09:06 )             30.6     10-16    136  |  99  |  34.1<H>  ----------------------------<  101<H>  3.8   |  25.0  |  1.77<H>    Ca    8.6      16 Oct 2023 05:06  Phos  2.6     10-16  Mg     1.8     10-16        Urinalysis Basic - ( 16 Oct 2023 05:06 )    Color: x / Appearance: x / SG: x / pH: x  Gluc: 101 mg/dL / Ketone: x  / Bili: x / Urobili: x   Blood: x / Protein: x / Nitrite: x   Leuk Esterase: x / RBC: x / WBC x   Sq Epi: x / Non Sq Epi: x / Bacteria: x          MEDICATIONS  (STANDING):  amLODIPine   Tablet 10 milliGRAM(s) Oral daily  apixaban 5 milliGRAM(s) Oral every 12 hours  carvedilol 6.25 milliGRAM(s) Oral every 12 hours  ceFAZolin  Injectable. 2000 milliGRAM(s) IV Push every 8 hours  cloNIDine Patch 0.1 mG/24Hr(s) 1 patch Transdermal every 7 days  levETIRAcetam 1000 milliGRAM(s) Oral two times a day  magnesium oxide 400 milliGRAM(s) Oral two times a day with meals  polyethylene glycol 3350 17 Gram(s) Oral daily  potassium chloride   Powder 20 milliEquivalent(s) Oral daily  senna 2 Tablet(s) Oral at bedtime  tamsulosin 0.4 milliGRAM(s) Oral at bedtime  torsemide 20 milliGRAM(s) Oral daily    MEDICATIONS  (PRN):  acetaminophen     Tablet .. 650 milliGRAM(s) Oral every 6 hours PRN Temp greater or equal to 38C (100.4F), Mild Pain (1 - 3)  aluminum hydroxide/magnesium hydroxide/simethicone Suspension 30 milliLiter(s) Oral every 4 hours PRN Dyspepsia  HYDROmorphone   Tablet 2 milliGRAM(s) Oral every 6 hours PRN Severe Pain (7 - 10)  melatonin 3 milliGRAM(s) Oral at bedtime PRN Insomnia  ondansetron Injectable 4 milliGRAM(s) IV Push every 8 hours PRN Nausea and/or Vomiting      RADIOLOGY & ADDITIONAL TESTS:

## 2023-10-19 ENCOUNTER — NON-APPOINTMENT (OUTPATIENT)
Age: 63
End: 2023-10-19

## 2023-10-27 ENCOUNTER — APPOINTMENT (OUTPATIENT)
Dept: VASCULAR SURGERY | Facility: CLINIC | Age: 63
End: 2023-10-27

## 2023-10-31 ENCOUNTER — APPOINTMENT (OUTPATIENT)
Dept: VASCULAR SURGERY | Facility: CLINIC | Age: 63
End: 2023-10-31
Payer: MEDICARE

## 2023-10-31 VITALS
HEIGHT: 67 IN | TEMPERATURE: 98.4 F | OXYGEN SATURATION: 96 % | HEART RATE: 60 BPM | DIASTOLIC BLOOD PRESSURE: 72 MMHG | SYSTOLIC BLOOD PRESSURE: 172 MMHG | BODY MASS INDEX: 26.37 KG/M2 | RESPIRATION RATE: 15 BRPM | WEIGHT: 168 LBS

## 2023-10-31 PROCEDURE — 99212 OFFICE O/P EST SF 10 MIN: CPT | Mod: 24

## 2023-10-31 PROCEDURE — 99024 POSTOP FOLLOW-UP VISIT: CPT

## 2023-11-01 ENCOUNTER — APPOINTMENT (OUTPATIENT)
Dept: INTERNAL MEDICINE | Facility: CLINIC | Age: 63
End: 2023-11-01

## 2023-11-02 NOTE — ED ADULT TRIAGE NOTE - HEIGHT IN FEET
5 Hpi Title: Evaluation of Skin Lesions How Severe Are Your Spot(S)?: mild Have Your Spot(S) Been Treated In The Past?: has not been treated

## 2023-11-06 NOTE — ED ADULT NURSE REASSESSMENT NOTE - NSFALLRISKASMT_ED_ALL_ED_DT
I talked to aunt and advise is given.  Inform her the x-ray report and advised her to give me a call after 3 weeks and update me  KA       22-May-2023 19:45

## 2023-11-13 NOTE — ED ADULT TRIAGE NOTE - DOMESTIC TRAVEL HIGH RISK QUESTION
Patient denies dizziness, lightheadedness, or weakness. Patient declines assistance getting dressed. Call light within reach of patient.      No

## 2023-11-17 NOTE — DISCHARGE NOTE NURSING/CASE MANAGEMENT/SOCIAL WORK - DATE OF LAST VACCINATION
14-Jul-2021 Tetracycline Pregnancy And Lactation Text: This medication is Pregnancy Category D and not consider safe during pregnancy. It is also excreted in breast milk.

## 2023-11-30 ENCOUNTER — NON-APPOINTMENT (OUTPATIENT)
Age: 63
End: 2023-11-30

## 2023-11-30 ENCOUNTER — APPOINTMENT (OUTPATIENT)
Dept: CARDIOLOGY | Facility: CLINIC | Age: 63
End: 2023-11-30
Payer: MEDICARE

## 2023-11-30 VITALS — DIASTOLIC BLOOD PRESSURE: 80 MMHG | SYSTOLIC BLOOD PRESSURE: 190 MMHG

## 2023-11-30 VITALS
OXYGEN SATURATION: 96 % | DIASTOLIC BLOOD PRESSURE: 82 MMHG | BODY MASS INDEX: 24.33 KG/M2 | HEIGHT: 67 IN | WEIGHT: 155 LBS | HEART RATE: 78 BPM | TEMPERATURE: 97.9 F | SYSTOLIC BLOOD PRESSURE: 198 MMHG

## 2023-11-30 PROCEDURE — 99215 OFFICE O/P EST HI 40 MIN: CPT

## 2023-11-30 PROCEDURE — 93000 ELECTROCARDIOGRAM COMPLETE: CPT

## 2023-11-30 RX ORDER — CLONIDINE 0.3 MG/24H
PATCH, EXTENDED RELEASE TRANSDERMAL
Refills: 0 | Status: DISCONTINUED | COMMUNITY

## 2023-11-30 NOTE — DISCHARGE NOTE PROVIDER - NSDCQMPCI_CARD_ALL_CORE
Aklief Pregnancy And Lactation Text: It is unknown if this medication is safe to use during pregnancy.  It is unknown if this medication is excreted in breast milk.  Breastfeeding women should use the topical cream on the smallest area of the skin for the shortest time needed while breastfeeding.  Do not apply to nipple and areola. Tazorac Counseling:  Patient advised that medication is irritating and drying.  Patient may need to apply sparingly and wash off after an hour before eventually leaving it on overnight.  The patient verbalized understanding of the proper use and possible adverse effects of tazorac.  All of the patient's questions and concerns were addressed. Winlevi Pregnancy And Lactation Text: This medication is considered safe during pregnancy and breastfeeding. Detail Level: Zone Dapsone Counseling: I discussed with the patient the risks of dapsone including but not limited to hemolytic anemia, agranulocytosis, rashes, methemoglobinemia, kidney failure, peripheral neuropathy, headaches, GI upset, and liver toxicity.  Patients who start dapsone require monitoring including baseline LFTs and weekly CBCs for the first month, then every month thereafter.  The patient verbalized understanding of the proper use and possible adverse effects of dapsone.  All of the patient's questions and concerns were addressed. Isotretinoin Pregnancy And Lactation Text: This medication is Pregnancy Category X and is considered extremely dangerous during pregnancy. It is unknown if it is excreted in breast milk. Azelaic Acid Pregnancy And Lactation Text: This medication is considered safe during pregnancy and breast feeding. Topical Clindamycin Counseling: Patient counseled that this medication may cause skin irritation or allergic reactions.  In the event of skin irritation, the patient was advised to reduce the amount of the drug applied or use it less frequently.   The patient verbalized understanding of the proper use and possible adverse effects of clindamycin.  All of the patient's questions and concerns were addressed. Spironolactone Counseling: Patient advised regarding risks of diarrhea, abdominal pain, hyperkalemia, birth defects (for female patients), liver toxicity and renal toxicity. The patient may need blood work to monitor liver and kidney function and potassium levels while on therapy. The patient verbalized understanding of the proper use and possible adverse effects of spironolactone.  All of the patient's questions and concerns were addressed. Benzoyl Peroxide Counseling: Patient counseled that medicine may cause skin irritation and bleach clothing.  In the event of skin irritation, the patient was advised to reduce the amount of the drug applied or use it less frequently.   The patient verbalized understanding of the proper use and possible adverse effects of benzoyl peroxide.  All of the patient's questions and concerns were addressed. No Topical Clindamycin Pregnancy And Lactation Text: This medication is Pregnancy Category B and is considered safe during pregnancy. It is unknown if it is excreted in breast milk. Spironolactone Pregnancy And Lactation Text: This medication can cause feminization of the male fetus and should be avoided during pregnancy. The active metabolite is also found in breast milk. High Dose Vitamin A Counseling: Side effects reviewed, pt to contact office should one occur. Bactrim Counseling:  I discussed with the patient the risks of sulfa antibiotics including but not limited to GI upset, allergic reaction, drug rash, diarrhea, dizziness, photosensitivity, and yeast infections.  Rarely, more serious reactions can occur including but not limited to aplastic anemia, agranulocytosis, methemoglobinemia, blood dyscrasias, liver or kidney failure, lung infiltrates or desquamative/blistering drug rashes. Erythromycin Pregnancy And Lactation Text: This medication is Pregnancy Category B and is considered safe during pregnancy. It is also excreted in breast milk. Doxycycline Pregnancy And Lactation Text: This medication is Pregnancy Category D and not consider safe during pregnancy. It is also excreted in breast milk but is considered safe for shorter treatment courses. Sarecycline Counseling: Patient advised regarding possible photosensitivity and discoloration of the teeth, skin, lips, tongue and gums.  Patient instructed to avoid sunlight, if possible.  When exposed to sunlight, patients should wear protective clothing, sunglasses, and sunscreen.  The patient was instructed to call the office immediately if the following severe adverse effects occur:  hearing changes, easy bruising/bleeding, severe headache, or vision changes.  The patient verbalized understanding of the proper use and possible adverse effects of sarecycline.  All of the patient's questions and concerns were addressed. Birth Control Pills Counseling: Birth Control Pill Counseling: I discussed with the patient the potential side effects of OCPs including but not limited to increased risk of stroke, heart attack, thrombophlebitis, deep venous thrombosis, hepatic adenomas, breast changes, GI upset, headaches, and depression.  The patient verbalized understanding of the proper use and possible adverse effects of OCPs. All of the patient's questions and concerns were addressed. Topical Retinoid counseling:  Patient advised to apply a pea-sized amount only at bedtime and wait 30 minutes after washing their face before applying.  If too drying, patient may add a non-comedogenic moisturizer. The patient verbalized understanding of the proper use and possible adverse effects of retinoids.  All of the patient's questions and concerns were addressed. Tetracycline Pregnancy And Lactation Text: This medication is Pregnancy Category D and not consider safe during pregnancy. It is also excreted in breast milk. Topical Sulfur Applications Pregnancy And Lactation Text: This medication is Pregnancy Category C and has an unknown safety profile during pregnancy. It is unknown if this topical medication is excreted in breast milk. Minocycline Counseling: Patient advised regarding possible photosensitivity and discoloration of the teeth, skin, lips, tongue and gums.  Patient instructed to avoid sunlight, if possible.  When exposed to sunlight, patients should wear protective clothing, sunglasses, and sunscreen.  The patient was instructed to call the office immediately if the following severe adverse effects occur:  hearing changes, easy bruising/bleeding, severe headache, or vision changes.  The patient verbalized understanding of the proper use and possible adverse effects of minocycline.  All of the patient's questions and concerns were addressed. Azelaic Acid Counseling: Patient counseled that medicine may cause skin irritation and to avoid applying near the eyes.  In the event of skin irritation, the patient was advised to reduce the amount of the drug applied or use it less frequently.   The patient verbalized understanding of the proper use and possible adverse effects of azelaic acid.  All of the patient's questions and concerns were addressed. Tazorac Pregnancy And Lactation Text: This medication is not safe during pregnancy. It is unknown if this medication is excreted in breast milk. Isotretinoin Counseling: Patient should get monthly blood tests, not donate blood, not drive at night if vision affected, not share medication, and not undergo elective surgery for 6 months after tx completed. Side effects reviewed, pt to contact office should one occur. Winlevi Counseling:  I discussed with the patient the risks of topical clascoterone including but not limited to erythema, scaling, itching, and stinging. Patient voiced their understanding. Topical Retinoid Pregnancy And Lactation Text: This medication is Pregnancy Category C. It is unknown if this medication is excreted in breast milk. Aklief counseling:  Patient advised to apply a pea-sized amount only at bedtime and wait 30 minutes after washing their face before applying.  If too drying, patient may add a non-comedogenic moisturizer.  The most commonly reported side effects including irritation, redness, scaling, dryness, stinging, burning, itching, and increased risk of sunburn.  The patient verbalized understanding of the proper use and possible adverse effects of retinoids.  All of the patient's questions and concerns were addressed. Erythromycin Counseling:  I discussed with the patient the risks of erythromycin including but not limited to GI upset, allergic reaction, drug rash, diarrhea, increase in liver enzymes, and yeast infections. Birth Control Pills Pregnancy And Lactation Text: This medication should be avoided if pregnant and for the first 30 days post-partum. Azithromycin Counseling:  I discussed with the patient the risks of azithromycin including but not limited to GI upset, allergic reaction, drug rash, diarrhea, and yeast infections. Dapsone Pregnancy And Lactation Text: This medication is Pregnancy Category C and is not considered safe during pregnancy or breast feeding. Tetracycline Counseling: Patient counseled regarding possible photosensitivity and increased risk for sunburn.  Patient instructed to avoid sunlight, if possible.  When exposed to sunlight, patients should wear protective clothing, sunglasses, and sunscreen.  The patient was instructed to call the office immediately if the following severe adverse effects occur:  hearing changes, easy bruising/bleeding, severe headache, or vision changes.  The patient verbalized understanding of the proper use and possible adverse effects of tetracycline.  All of the patient's questions and concerns were addressed. Patient understands to avoid pregnancy while on therapy due to potential birth defects. High Dose Vitamin A Pregnancy And Lactation Text: High dose vitamin A therapy is contraindicated during pregnancy and breast feeding. Topical Sulfur Applications Counseling: Topical Sulfur Counseling: Patient counseled that this medication may cause skin irritation or allergic reactions.  In the event of skin irritation, the patient was advised to reduce the amount of the drug applied or use it less frequently.   The patient verbalized understanding of the proper use and possible adverse effects of topical sulfur application.  All of the patient's questions and concerns were addressed. Benzoyl Peroxide Pregnancy And Lactation Text: This medication is Pregnancy Category C. It is unknown if benzoyl peroxide is excreted in breast milk. Use Enhanced Medication Counseling?: No Azithromycin Pregnancy And Lactation Text: This medication is considered safe during pregnancy and is also secreted in breast milk. Doxycycline Counseling:  Patient counseled regarding possible photosensitivity and increased risk for sunburn.  Patient instructed to avoid sunlight, if possible.  When exposed to sunlight, patients should wear protective clothing, sunglasses, and sunscreen.  The patient was instructed to call the office immediately if the following severe adverse effects occur:  hearing changes, easy bruising/bleeding, severe headache, or vision changes.  The patient verbalized understanding of the proper use and possible adverse effects of doxycycline.  All of the patient's questions and concerns were addressed. Bactrim Pregnancy And Lactation Text: This medication is Pregnancy Category D and is known to cause fetal risk.  It is also excreted in breast milk.

## 2023-12-11 RX ORDER — CHLORHEXIDINE GLUCONATE 213 G/1000ML
1 SOLUTION TOPICAL ONCE
Refills: 0 | Status: DISCONTINUED | OUTPATIENT
Start: 2023-12-12 | End: 2023-12-26

## 2023-12-11 NOTE — H&P PST ADULT - HISTORY OF PRESENT ILLNESS
Department of Cardiology                                                                  [facility]/[place] [address] [facility]                                                            Telephone: [phone]. Fax:[phone]                                                                                     Pre-MUSTAPHA Note        Narrative:   62-year-old male with h/o HTN, aortic dissection s/p repair, CAD and VHD with prior CABG, AVR and MV repair, bowel ischemia treated with resection, ESRD on dialysis, AVMs s/p cauterization, atrial fibrillation/flutter with previous RFA, PVCs, admitted to [facility] in 6/2023 after being found down at home and subsequently found to have sudural/subarachnoid hemorrhage, Eliquis was stopped and TTE at the time noted a possible echogenicity on the mitral valve, MUSTAPHA was deferred because of his clinical condition, ultimately discharged to rehab, followed by neurosurgery and restarted Eliquis 8/2023, MUSTAPHA performed 9/27 with +MV vegetation and source thought to be AV graft which was removed 10/1/23, treated with 6 week A/B course, now presenting for follow-up MUSTAPHA.     Of note, followed by EP and plan for possible LAAO closure device implant at future date.       ASA and Mallampati: Per Anesthesia    	                                                                             Department of Cardiology                                                                  [facility]/[place] [address] [facility]                                                            Telephone: [phone]. Fax:[phone]                                                                                     Pre-MUSTAPHA Note        Narrative:   62-year-old male with h/o HTN, aortic dissection s/p repair, CAD and VHD with prior CABG, AVR and MV repair, bowel ischemia treated with resection, ESRD on dialysis, AVMs s/p cauterization, atrial fibrillation/flutter with previous RFA, PVCs, admitted to [facility] in 6/2023 after being found down at home and subsequently found to have sudural/subarachnoid hemorrhage, Eliquis was stopped and TTE at the time noted a possible echogenicity on the mitral valve, MUSTAPHA was deferred because of his clinical condition, ultimately discharged to rehab, followed by neurosurgery and restarted Eliquis 8/2023, MUSTAPHA performed 9/27 with +MV vegetation and source thought to be AV graft which was removed 10/1/23, treated with 6 week A/B course, now presenting for follow-up MUSTAPHA.     Of note, followed by EP and plan for possible LAAO closure device implant at future date.       ASA and Mallampati: Per Anesthesia    	                                                                             Department of Cardiology                                                                  [facility]/[place] [address] [address] [facility]                                                            Telephone: [phone]. Fax:[phone]                                                                                     Pre-MUSTAPHA Note        Narrative:   62-year-old male with h/o HTN, aortic dissection s/p repair, CAD and VHD with prior CABG/bio-AVR/bio-MVR, bowel ischemia treated with resection, ESRD on dialysis, AVMs s/p cauterization, atrial fibrillation/flutter with previous RFA, PVCs, admitted to [facility] in 6/2023 after being found down at home and subsequently found to have sudural/subarachnoid hemorrhage, Eliquis was stopped and TTE at the time noted a possible echogenicity on the mitral valve, MUSTAPHA was deferred because of his clinical condition, ultimately discharged to rehab, followed by neurosurgery and restarted Eliquis 8/2023, MUSTAPHA performed 9/27 with +MV vegetation and source thought to be AV graft which was removed 10/1/23, treated with 6 week A/B course, now presenting for follow-up MUSTAPHA.     Of note, followed by EP and plan for possible LAAO closure device implant at future date.       ASA and Mallampati: Per Anesthesia    MUSTAPHA 9/27/23:  PHYSICIAN INTERPRETATION:  Left Ventricle: The left ventricular internal cavity size is normal. Left   ventricular wall thickness is normal.  Left ventricular ejection fraction, by visual estimation, is 55 to 60%.  Right Ventricle: The right ventricular size is normal. RV systolic   function is normal.  Left Atrium: Severely enlarged left atrium. No left atrial appendage   thrombus is seen.  Pericardium: There is no evidence of pericardial effusion.  Mitral Valve: Echo and/or Doppler findings are consistent with vegetation   on the mitral prosthesis. Mild mitral valve regurgitation is seen. There   is a bioprosthetic valve in the mitral position with thickened leaflets   yet leaflet mobility is fairly preserved. There is trace to mild valvular   regurgitation with no perivalvular leaks. No rockingmotion or dehiscence   noted. There is a large and mobile vegetation measuring 1.8x1.0 cm   attached to the atrial surface of the medial bioprosthetic leaflet. There   is evidence of at least mild valve stenosis with a mean transvalvular   gradient of 5 mmHg at a heart rate of 60 bpm. The mitral valve area by 3D   is 1.57 cm2.  Tricuspid Valve: Mild tricuspid regurgitation is visualized.  Aortic Valve: There is a bioprosthetic valve in the aortic position with   no perivavlular or valvular regurgitation. No vegetations noted. This is   a normally functioning bioprosthetic valve. The Vmax recorded was 2.9   m/secs from a transgastric view. The 3D ALEC was 1.24 cm2.  Pulmonic Valve: Trace pulmonic valve regurgitation.  Aorta: There is dilatationof the aortic root. There is a prosthetic   graft in lieu of the asceding aorta. No leaks noted on color Doppler at   the proximal anastomsis site.    There is a dissection plane noted all through the desceding thoracic   aorta and distal arch with thombus formation in the false lumen excpet   for a small area in the most distal thoracic aorta were an entry point is   noted on color Doppler with little flow through it from the true lumen.    The descending thoracic aorta is dilated measuring 3.5 cm in diameter at   its most distal visualized segment.  Shunts: Agitated saline contrast was given intravenously to evaluate for   intracardiac shunting. There is no evidence of a patent foramen ovale.      Summary:   1. Left ventricular ejection fraction, by visual estimation, is 55 to   60%.   2. Severely enlarged left atrium.   3. Mild mitral valve regurgitation.   4. Mitral prosthesis vegetation.   5. There is a bioprosthetic valve in the mitral position with thickened   leaflets yet leaflet mobility is fairly preserved. There is trace to mild   valvular regurgitation with no perivalvular leaks. No rocking motion or   dehiscence noted. There is a large and mobile vegetation measuring   1.8x1.0 cm attached to the atrial surface of the medial bioprosthetic   leaflet. There is evidence of at least mild valve stenosis with a mean   transvalvular gradient of 5 mmHg at a heart rate of 60 bpm. The mitral   valve area by 3D is 1.57 cm2.   6. Mild tricuspid regurgitation.   7. Bioprosthesis in the aortic position.   8. Dilatation of the aortic root.                                                                               Department of Cardiology                                                                  [facility]/[place] [address] [address] [facility]                                                            Telephone: [phone]. Fax:[phone]                                                                                     Pre-MUSTAPHA Note        Narrative:   62-year-old male with h/o HTN, aortic dissection s/p repair, CAD and VHD with prior CABG/bio-AVR/bio-MVR, bowel ischemia treated with resection, ESRD on dialysis, AVMs s/p cauterization, atrial fibrillation/flutter with previous RFA, PVCs, admitted to [facility] in 6/2023 after being found down at home and subsequently found to have sudural/subarachnoid hemorrhage, Eliquis was stopped and TTE at the time noted a possible echogenicity on the mitral valve, MUSTAPHA was deferred because of his clinical condition, ultimately discharged to rehab, followed by neurosurgery and restarted Eliquis 8/2023, MUSTAPHA performed 9/27 with +MV vegetation and source thought to be AV graft which was removed 10/1/23, treated with 6 week A/B course, now presenting for follow-up MUSTAPHA.     Of note, followed by EP and plan for possible LAAO closure device implant at future date.       ASA and Mallampati: Per Anesthesia    MUSTAPHA 9/27/23:  PHYSICIAN INTERPRETATION:  Left Ventricle: The left ventricular internal cavity size is normal. Left   ventricular wall thickness is normal.  Left ventricular ejection fraction, by visual estimation, is 55 to 60%.  Right Ventricle: The right ventricular size is normal. RV systolic   function is normal.  Left Atrium: Severely enlarged left atrium. No left atrial appendage   thrombus is seen.  Pericardium: There is no evidence of pericardial effusion.  Mitral Valve: Echo and/or Doppler findings are consistent with vegetation   on the mitral prosthesis. Mild mitral valve regurgitation is seen. There   is a bioprosthetic valve in the mitral position with thickened leaflets   yet leaflet mobility is fairly preserved. There is trace to mild valvular   regurgitation with no perivalvular leaks. No rockingmotion or dehiscence   noted. There is a large and mobile vegetation measuring 1.8x1.0 cm   attached to the atrial surface of the medial bioprosthetic leaflet. There   is evidence of at least mild valve stenosis with a mean transvalvular   gradient of 5 mmHg at a heart rate of 60 bpm. The mitral valve area by 3D   is 1.57 cm2.  Tricuspid Valve: Mild tricuspid regurgitation is visualized.  Aortic Valve: There is a bioprosthetic valve in the aortic position with   no perivavlular or valvular regurgitation. No vegetations noted. This is   a normally functioning bioprosthetic valve. The Vmax recorded was 2.9   m/secs from a transgastric view. The 3D ALEC was 1.24 cm2.  Pulmonic Valve: Trace pulmonic valve regurgitation.  Aorta: There is dilatationof the aortic root. There is a prosthetic   graft in lieu of the asceding aorta. No leaks noted on color Doppler at   the proximal anastomsis site.    There is a dissection plane noted all through the desceding thoracic   aorta and distal arch with thombus formation in the false lumen excpet   for a small area in the most distal thoracic aorta were an entry point is   noted on color Doppler with little flow through it from the true lumen.    The descending thoracic aorta is dilated measuring 3.5 cm in diameter at   its most distal visualized segment.  Shunts: Agitated saline contrast was given intravenously to evaluate for   intracardiac shunting. There is no evidence of a patent foramen ovale.      Summary:   1. Left ventricular ejection fraction, by visual estimation, is 55 to   60%.   2. Severely enlarged left atrium.   3. Mild mitral valve regurgitation.   4. Mitral prosthesis vegetation.   5. There is a bioprosthetic valve in the mitral position with thickened   leaflets yet leaflet mobility is fairly preserved. There is trace to mild   valvular regurgitation with no perivalvular leaks. No rocking motion or   dehiscence noted. There is a large and mobile vegetation measuring   1.8x1.0 cm attached to the atrial surface of the medial bioprosthetic   leaflet. There is evidence of at least mild valve stenosis with a mean   transvalvular gradient of 5 mmHg at a heart rate of 60 bpm. The mitral   valve area by 3D is 1.57 cm2.   6. Mild tricuspid regurgitation.   7. Bioprosthesis in the aortic position.   8. Dilatation of the aortic root.

## 2023-12-11 NOTE — H&P PST ADULT - ASSESSMENT
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    -MUSTAPHA as ordered  -Labs and ECG reviewed  -Procedure discussed with patient; risks and benefits explained; questions answered  -Consent obtained by Echocardiographer and anesthesiologist ASSESSMENT: 64yo male with recent MV endocarditis with source likely AV graft s/p removal and 6 week course of IV A/B therapy, now presents for follow-up MUSTAPHA.    -MUSTAPHA as ordered  -Labs and ECG reviewed  -Procedure discussed with patient; risks and benefits explained; questions answered  -Consent obtained by Echocardiographer and anesthesiologist ASSESSMENT: 62yo male with recent MV endocarditis with source likely AV graft s/p removal and 6 week course of IV A/B therapy, now presents for follow-up MUSTAPHA.    -MUSTAPHA as ordered  -ECG reviewed  -As per MD and anesthesia pt cleared for procedure prior to labs resulted  -Procedure discussed with patient; risks and benefits explained; questions answered  -Consent obtained by Echocardiographer and anesthesiologist ASSESSMENT: 64yo male with recent MV endocarditis with source likely AV graft s/p removal and 6 week course of IV A/B therapy, now presents for follow-up MUSTAPHA.    -MUSTAPHA as ordered  -ECG reviewed  -As per MD and anesthesia pt cleared for procedure prior to labs resulted  -Procedure discussed with patient; risks and benefits explained; questions answered  -Consent obtained by Echocardiographer and anesthesiologist

## 2023-12-12 ENCOUNTER — OUTPATIENT (OUTPATIENT)
Dept: OUTPATIENT SERVICES | Facility: HOSPITAL | Age: 63
LOS: 1 days | End: 2023-12-12
Payer: MEDICARE

## 2023-12-12 ENCOUNTER — TRANSCRIPTION ENCOUNTER (OUTPATIENT)
Age: 63
End: 2023-12-12

## 2023-12-12 VITALS
HEIGHT: 68 IN | TEMPERATURE: 98 F | HEART RATE: 74 BPM | WEIGHT: 175.05 LBS | SYSTOLIC BLOOD PRESSURE: 184 MMHG | RESPIRATION RATE: 16 BRPM | OXYGEN SATURATION: 97 % | DIASTOLIC BLOOD PRESSURE: 94 MMHG

## 2023-12-12 VITALS
HEART RATE: 65 BPM | DIASTOLIC BLOOD PRESSURE: 90 MMHG | RESPIRATION RATE: 16 BRPM | OXYGEN SATURATION: 97 % | SYSTOLIC BLOOD PRESSURE: 177 MMHG

## 2023-12-12 DIAGNOSIS — Z95.2 PRESENCE OF PROSTHETIC HEART VALVE: Chronic | ICD-10-CM

## 2023-12-12 DIAGNOSIS — Z90.49 ACQUIRED ABSENCE OF OTHER SPECIFIED PARTS OF DIGESTIVE TRACT: Chronic | ICD-10-CM

## 2023-12-12 DIAGNOSIS — I48.91 UNSPECIFIED ATRIAL FIBRILLATION: ICD-10-CM

## 2023-12-12 DIAGNOSIS — Z86.79 PERSONAL HISTORY OF OTHER DISEASES OF THE CIRCULATORY SYSTEM: Chronic | ICD-10-CM

## 2023-12-12 DIAGNOSIS — I77.0 ARTERIOVENOUS FISTULA, ACQUIRED: Chronic | ICD-10-CM

## 2023-12-12 DIAGNOSIS — Z95.1 PRESENCE OF AORTOCORONARY BYPASS GRAFT: Chronic | ICD-10-CM

## 2023-12-12 LAB
ANION GAP SERPL CALC-SCNC: 16 MMOL/L — SIGNIFICANT CHANGE UP (ref 5–17)
ANION GAP SERPL CALC-SCNC: 16 MMOL/L — SIGNIFICANT CHANGE UP (ref 5–17)
BASOPHILS # BLD AUTO: 0.04 K/UL — SIGNIFICANT CHANGE UP (ref 0–0.2)
BASOPHILS # BLD AUTO: 0.04 K/UL — SIGNIFICANT CHANGE UP (ref 0–0.2)
BASOPHILS NFR BLD AUTO: 0.5 % — SIGNIFICANT CHANGE UP (ref 0–2)
BASOPHILS NFR BLD AUTO: 0.5 % — SIGNIFICANT CHANGE UP (ref 0–2)
BUN SERPL-MCNC: 38.7 MG/DL — HIGH (ref 8–20)
BUN SERPL-MCNC: 38.7 MG/DL — HIGH (ref 8–20)
CALCIUM SERPL-MCNC: 9 MG/DL — SIGNIFICANT CHANGE UP (ref 8.4–10.5)
CALCIUM SERPL-MCNC: 9 MG/DL — SIGNIFICANT CHANGE UP (ref 8.4–10.5)
CHLORIDE SERPL-SCNC: 104 MMOL/L — SIGNIFICANT CHANGE UP (ref 96–108)
CHLORIDE SERPL-SCNC: 104 MMOL/L — SIGNIFICANT CHANGE UP (ref 96–108)
CO2 SERPL-SCNC: 22 MMOL/L — SIGNIFICANT CHANGE UP (ref 22–29)
CO2 SERPL-SCNC: 22 MMOL/L — SIGNIFICANT CHANGE UP (ref 22–29)
CREAT SERPL-MCNC: 2.28 MG/DL — HIGH (ref 0.5–1.3)
CREAT SERPL-MCNC: 2.28 MG/DL — HIGH (ref 0.5–1.3)
EGFR: 32 ML/MIN/1.73M2 — LOW
EGFR: 32 ML/MIN/1.73M2 — LOW
EOSINOPHIL # BLD AUTO: 0.43 K/UL — SIGNIFICANT CHANGE UP (ref 0–0.5)
EOSINOPHIL # BLD AUTO: 0.43 K/UL — SIGNIFICANT CHANGE UP (ref 0–0.5)
EOSINOPHIL NFR BLD AUTO: 4.9 % — SIGNIFICANT CHANGE UP (ref 0–6)
EOSINOPHIL NFR BLD AUTO: 4.9 % — SIGNIFICANT CHANGE UP (ref 0–6)
GLUCOSE SERPL-MCNC: 98 MG/DL — SIGNIFICANT CHANGE UP (ref 70–99)
GLUCOSE SERPL-MCNC: 98 MG/DL — SIGNIFICANT CHANGE UP (ref 70–99)
HCT VFR BLD CALC: 34.8 % — LOW (ref 39–50)
HCT VFR BLD CALC: 34.8 % — LOW (ref 39–50)
HGB BLD-MCNC: 11.1 G/DL — LOW (ref 13–17)
HGB BLD-MCNC: 11.1 G/DL — LOW (ref 13–17)
IMM GRANULOCYTES NFR BLD AUTO: 0.3 % — SIGNIFICANT CHANGE UP (ref 0–0.9)
IMM GRANULOCYTES NFR BLD AUTO: 0.3 % — SIGNIFICANT CHANGE UP (ref 0–0.9)
LYMPHOCYTES # BLD AUTO: 1.69 K/UL — SIGNIFICANT CHANGE UP (ref 1–3.3)
LYMPHOCYTES # BLD AUTO: 1.69 K/UL — SIGNIFICANT CHANGE UP (ref 1–3.3)
LYMPHOCYTES # BLD AUTO: 19.4 % — SIGNIFICANT CHANGE UP (ref 13–44)
LYMPHOCYTES # BLD AUTO: 19.4 % — SIGNIFICANT CHANGE UP (ref 13–44)
MAGNESIUM SERPL-MCNC: 1.9 MG/DL — SIGNIFICANT CHANGE UP (ref 1.6–2.6)
MAGNESIUM SERPL-MCNC: 1.9 MG/DL — SIGNIFICANT CHANGE UP (ref 1.6–2.6)
MCHC RBC-ENTMCNC: 29.4 PG — SIGNIFICANT CHANGE UP (ref 27–34)
MCHC RBC-ENTMCNC: 29.4 PG — SIGNIFICANT CHANGE UP (ref 27–34)
MCHC RBC-ENTMCNC: 31.9 GM/DL — LOW (ref 32–36)
MCHC RBC-ENTMCNC: 31.9 GM/DL — LOW (ref 32–36)
MCV RBC AUTO: 92.1 FL — SIGNIFICANT CHANGE UP (ref 80–100)
MCV RBC AUTO: 92.1 FL — SIGNIFICANT CHANGE UP (ref 80–100)
MONOCYTES # BLD AUTO: 0.49 K/UL — SIGNIFICANT CHANGE UP (ref 0–0.9)
MONOCYTES # BLD AUTO: 0.49 K/UL — SIGNIFICANT CHANGE UP (ref 0–0.9)
MONOCYTES NFR BLD AUTO: 5.6 % — SIGNIFICANT CHANGE UP (ref 2–14)
MONOCYTES NFR BLD AUTO: 5.6 % — SIGNIFICANT CHANGE UP (ref 2–14)
NEUTROPHILS # BLD AUTO: 6.02 K/UL — SIGNIFICANT CHANGE UP (ref 1.8–7.4)
NEUTROPHILS # BLD AUTO: 6.02 K/UL — SIGNIFICANT CHANGE UP (ref 1.8–7.4)
NEUTROPHILS NFR BLD AUTO: 69.3 % — SIGNIFICANT CHANGE UP (ref 43–77)
NEUTROPHILS NFR BLD AUTO: 69.3 % — SIGNIFICANT CHANGE UP (ref 43–77)
PLATELET # BLD AUTO: 175 K/UL — SIGNIFICANT CHANGE UP (ref 150–400)
PLATELET # BLD AUTO: 175 K/UL — SIGNIFICANT CHANGE UP (ref 150–400)
POTASSIUM SERPL-MCNC: 3.6 MMOL/L — SIGNIFICANT CHANGE UP (ref 3.5–5.3)
POTASSIUM SERPL-MCNC: 3.6 MMOL/L — SIGNIFICANT CHANGE UP (ref 3.5–5.3)
POTASSIUM SERPL-SCNC: 3.6 MMOL/L — SIGNIFICANT CHANGE UP (ref 3.5–5.3)
POTASSIUM SERPL-SCNC: 3.6 MMOL/L — SIGNIFICANT CHANGE UP (ref 3.5–5.3)
RBC # BLD: 3.78 M/UL — LOW (ref 4.2–5.8)
RBC # BLD: 3.78 M/UL — LOW (ref 4.2–5.8)
RBC # FLD: 14.3 % — SIGNIFICANT CHANGE UP (ref 10.3–14.5)
RBC # FLD: 14.3 % — SIGNIFICANT CHANGE UP (ref 10.3–14.5)
SODIUM SERPL-SCNC: 142 MMOL/L — SIGNIFICANT CHANGE UP (ref 135–145)
SODIUM SERPL-SCNC: 142 MMOL/L — SIGNIFICANT CHANGE UP (ref 135–145)
WBC # BLD: 8.7 K/UL — SIGNIFICANT CHANGE UP (ref 3.8–10.5)
WBC # BLD: 8.7 K/UL — SIGNIFICANT CHANGE UP (ref 3.8–10.5)
WBC # FLD AUTO: 8.7 K/UL — SIGNIFICANT CHANGE UP (ref 3.8–10.5)
WBC # FLD AUTO: 8.7 K/UL — SIGNIFICANT CHANGE UP (ref 3.8–10.5)

## 2023-12-12 PROCEDURE — 36415 COLL VENOUS BLD VENIPUNCTURE: CPT

## 2023-12-12 PROCEDURE — 93312 ECHO TRANSESOPHAGEAL: CPT

## 2023-12-12 PROCEDURE — 93005 ELECTROCARDIOGRAM TRACING: CPT

## 2023-12-12 PROCEDURE — 85025 COMPLETE CBC W/AUTO DIFF WBC: CPT

## 2023-12-12 PROCEDURE — 93010 ELECTROCARDIOGRAM REPORT: CPT

## 2023-12-12 PROCEDURE — 93312 ECHO TRANSESOPHAGEAL: CPT | Mod: 26

## 2023-12-12 PROCEDURE — 80048 BASIC METABOLIC PNL TOTAL CA: CPT

## 2023-12-12 PROCEDURE — 83735 ASSAY OF MAGNESIUM: CPT

## 2023-12-12 NOTE — DISCHARGE NOTE PROVIDER - NSDCCPCAREPLAN_GEN_ALL_CORE_FT
PRINCIPAL DISCHARGE DIAGNOSIS  Diagnosis: Other endocarditis  Assessment and Plan of Treatment: resolved  cont current medical management      SECONDARY DISCHARGE DIAGNOSES  Diagnosis: Elevated serum creatinine  Assessment and Plan of Treatment: RX for BMP sent.

## 2023-12-12 NOTE — DISCHARGE NOTE PROVIDER - NSDCCPTREATMENT_GEN_ALL_CORE_FT
PRINCIPAL PROCEDURE  Procedure: Transesophageal echocardiogram (MUSTAPHA)  Findings and Treatment: No Vegetation

## 2023-12-12 NOTE — DISCHARGE NOTE NURSING/CASE MANAGEMENT/SOCIAL WORK - NSDCVIVACCINE_GEN_ALL_CORE_FT
influenza, injectable, quadrivalent, preservative free; 12-Dec-2020 09:28; Lynn Gerardo); [employer]; 724k2 (Exp. Date: 30-Jun-2021); IntraMuscular; Deltoid Right.; 0.5 milliLiter(s); VIS (VIS Published: 15-Aug-2019, VIS Presented: 12-Dec-2020);    influenza, injectable, quadrivalent, preservative free; 12-Dec-2020 09:28; Lynn Gerardo); [employer]; 724k2 (Exp. Date: 30-Jun-2021); IntraMuscular; Deltoid Right.; 0.5 milliLiter(s); VIS (VIS Published: 15-Aug-2019, VIS Presented: 12-Dec-2020);

## 2023-12-12 NOTE — ED ADULT TRIAGE NOTE - ESI TRIAGE ACUITY LEVEL, MLM
Continue to monitor as ordered.
Debrox x3 days then flush.
Improving. Still rib and side pain but not as bad he says.
Pt states overall it is getting a bit better.
2

## 2023-12-12 NOTE — DISCHARGE NOTE PROVIDER - HOSPITAL COURSE
63 year old male s/p 6 weeks abx for MV endocarditis for MUSTAPHA to assess valve.  Now s/p MUSTAPHA-NO vegetation seen    Elevated bun/cr to follow up in office.  RX sent with pt.

## 2023-12-12 NOTE — DISCHARGE NOTE PROVIDER - CARE PROVIDERS DIRECT ADDRESSES
[email or web address]

## 2023-12-12 NOTE — DISCHARGE NOTE NURSING/CASE MANAGEMENT/SOCIAL WORK - NSDCPEFALRISK_GEN_ALL_CORE
For information on Fall & Injury Prevention, visit: [email or web address] OR  [email or web address] OR  https://www.cdc.gov/steadi/patient.html For information on Fall & Injury Prevention, visit: [email or web address] OR  [email or web address] OR  https://www.cdc.gov/steadi/patient.html

## 2023-12-12 NOTE — DISCHARGE NOTE PROVIDER - CARE PROVIDER_API CALL
Felice Frankel  Interventional Cardiology  [address], [address]  [place] [address]  Phone: [phone]  Fax: [phone]  Follow Up Time:    Felice Frankel  Interventional Cardiology  [address], [address]  [place] [address]  Phone: [phone]  Fax: [phone]  Follow Up Time:

## 2023-12-12 NOTE — DISCHARGE NOTE NURSING/CASE MANAGEMENT/SOCIAL WORK - PATIENT PORTAL LINK FT
You can access the FollowMyHealth Patient Portal offered by [facility] by registering at the following website: [email or web address]. By joining [employer]’s FollowMyHealth portal, you will also be able to view your health information using other applications (apps) compatible with our system. You can access the FollowMyHealth Patient Portal offered by [facility] by registering at the following website: [email or web address]. By joining [employer]’s FollowMyHealth portal, you will also be able to view your health information using other applications (apps) compatible with our system.

## 2023-12-12 NOTE — DISCHARGE NOTE PROVIDER - NSDCFUSCHEDAPPT_GEN_ALL_CORE_FT
Brianne To  [facility]  CARDIOLOGY [address]  Scheduled Appointment: 12/19/2023    Marla Mascorro  [facility]  GASTRO [address]  Scheduled Appointment: 02/12/2024     Brianne To  [facility]  CARDIOLOGY [address]  Scheduled Appointment: 12/19/2023    Marla Mascorro  [facility]  GASTRO [address]  Scheduled Appointment: 02/12/2024

## 2023-12-12 NOTE — DISCHARGE NOTE PROVIDER - NSDCMRMEDTOKEN_GEN_ALL_CORE_FT
amLODIPine 10 mg oral tablet: 1 tab(s) orally once a day  apixaban 5 mg oral tablet: 1 tab(s) orally every 12 hours  atorvastatin 40 mg oral tablet: 1 tab(s) orally once a day (at bedtime)  BMP on 12/14/23 fax results to Dr. Frankel [phone]: 12/14/23  carvedilol 6.25 mg oral tablet: 1 tab(s) orally every 12 hours  isosorbide dinitrate 30 mg oral tablet: 1 tab(s) orally once a day  levETIRAcetam 1000 mg oral tablet: 1 tab(s) orally 2 times a day  Lomocot 2.5 mg-0.025 mg oral tablet: 2 tab(s) orally 4 times a day as needed  sodium bicarbonate 650 mg oral tablet: 1 tab(s) orally 2 times a day 2 tabs  tamsulosin 0.4 mg oral capsule: 1 cap(s) orally once a day (at bedtime)  torsemide 20 mg oral tablet: 1 tab(s) orally once a day   amLODIPine 10 mg oral tablet: 1 tab(s) orally once a day  apixaban 5 mg oral tablet: 1 tab(s) orally every 12 hours  atorvastatin 40 mg oral tablet: 1 tab(s) orally once a day (at bedtime)  BMP on 12/14/23 fax results to Dr. Frankel [phone]: 12/14/23  carvedilol 6.25 mg oral tablet: 1 tab(s) orally every 12 hours  isosorbide dinitrate 30 mg oral tablet: 1 tab(s) orally once a day  levETIRAcetam 1000 mg oral tablet: 1 tab(s) orally 2 times a day  Lomocot 2.5 mg-0.025 mg oral tablet: 2 tab(s) orally 4 times a day as needed  sodium bicarbonate 650 mg oral tablet: 1 tab(s) orally 2 times a day 2 tabs  tamsulosin 0.4 mg oral capsule: 1 cap(s) orally once a day (at bedtime)  torsemide 20 mg oral tablet: 1 tab(s) orally once a day

## 2023-12-12 NOTE — PROGRESS NOTE ADULT - SUBJECTIVE AND OBJECTIVE BOX
Post procedure    S/P MUSTAPHA which revealed NO vegetation.  (full report to follow)    VSS    -bun/cr elevated.  Spoke to Dr. Whitaker will sent pt with RX to be followed by Dr. Frankel--has appt friday  -Discharge to home when criteria met

## 2023-12-15 NOTE — PATIENT PROFILE ADULT - OVER THE PAST TWO WEEKS HAVE YOU FELT DOWN, DEPRESSED OR HOPELESS?
Subjective   Chato Warner is a 7 y.o. male who presents for Earache (Onset yesterday/Here with mom/Took Ibuprofen).  Today he is accompanied by caregiver who is also providing history.  HPI:    Past 2 nights with ear pain.  No nfevers, no uri sx.     Objective   Temp 36.2 °C (97.1 °F) (Tympanic)   Wt 19.3 kg   Physical Exam  Constitutional:       Appearance: Normal appearance.   HENT:      Right Ear: Tympanic membrane, ear canal and external ear normal.      Left Ear: Ear canal and external ear normal. Tympanic membrane is erythematous and bulging.      Nose: Rhinorrhea present.      Mouth/Throat:      Mouth: Mucous membranes are moist.   Eyes:      Extraocular Movements: Extraocular movements intact.      Conjunctiva/sclera: Conjunctivae normal.      Pupils: Pupils are equal, round, and reactive to light.   Cardiovascular:      Rate and Rhythm: Normal rate and regular rhythm.      Heart sounds: Normal heart sounds.   Pulmonary:      Effort: Pulmonary effort is normal.      Breath sounds: Normal breath sounds.   Abdominal:      General: Bowel sounds are normal.      Palpations: Abdomen is soft.   Musculoskeletal:      Cervical back: Neck supple.   Lymphadenopathy:      Cervical: No cervical adenopathy.   Skin:     General: Skin is warm.   Neurological:      General: No focal deficit present.       Assessment/Plan   Problem List Items Addressed This Visit    None  Visit Diagnoses       Acute otitis media in pediatric patient, left    -  Primary    Relevant Medications    amoxicillin (Amoxil) 400 mg/5 mL suspension        Will treat with antibiotics. -Discussed pain control. -Discussed expected duration of symptoms. -F/U in 2-3 days if not improving, sooner if worsening.     yes

## 2023-12-19 ENCOUNTER — APPOINTMENT (OUTPATIENT)
Dept: CARDIOLOGY | Facility: CLINIC | Age: 63
End: 2023-12-19
Payer: MEDICARE

## 2023-12-19 VITALS
HEIGHT: 67 IN | BODY MASS INDEX: 27.94 KG/M2 | SYSTOLIC BLOOD PRESSURE: 134 MMHG | OXYGEN SATURATION: 96 % | DIASTOLIC BLOOD PRESSURE: 82 MMHG | HEART RATE: 74 BPM | WEIGHT: 178 LBS | TEMPERATURE: 98.5 F

## 2023-12-19 PROCEDURE — 99214 OFFICE O/P EST MOD 30 MIN: CPT

## 2023-12-19 RX ORDER — SODIUM BICARBONATE 650 MG/1
650 TABLET ORAL TWICE DAILY
Qty: 360 | Refills: 1 | Status: DISCONTINUED | COMMUNITY
Start: 2022-08-15 | End: 2023-12-19

## 2023-12-19 RX ORDER — CLONIDINE 0.1 MG/24H
0.1 PATCH, EXTENDED RELEASE TRANSDERMAL WEEKLY
Refills: 0 | Status: DISCONTINUED | COMMUNITY
Start: 2023-11-30 | End: 2023-12-19

## 2023-12-19 RX ORDER — DIPHENOXYLATE HYDROCHLORIDE AND ATROPINE SULFATE 2.5; .025 MG/1; MG/1
2.5-0.025 TABLET ORAL
Qty: 60 | Refills: 0 | Status: DISCONTINUED | COMMUNITY
Start: 2022-06-22 | End: 2023-12-19

## 2023-12-19 RX ORDER — CHOLECALCIFEROL (VITAMIN D3) 1250 MCG
1.25 MG CAPSULE ORAL
Refills: 0 | Status: ACTIVE | COMMUNITY

## 2024-01-04 NOTE — PROVIDER CONTACT NOTE (CRITICAL VALUE NOTIFICATION) - TEST AND RESULT REPORTED:
Keep scheduled OV.  Pt may need follow up with PCP prior if symptoms do not improve.    
Patient called requesting return call from nurse. She has concern of diarrhea, vomiting, fullness in abdomen and discomfort. Scheduled next available 1/23 , if able to accommodate sooner appt please reschedule. Please call to discuss [phone]      Thank you   
Pt has been advised of recommendations.   
Talked to patient to get more information. She has had diarrhea for the past 2 weeks. At first she thought it was due to an antibiotic cefdinir that she was taking while she had the flu. She has had vomiting for the past 3 days. She gets sick no matter what she eats, cereal or soup, she still gets sick. Feels very stuffed when she eats and that makes the vomiting worse. She does have Zofran, but that's not helping much. Last ov 8/28/23, next available apt that was made is 1/23/24, last egd with Dr Foley 8/2/23. She is taking her ursodiol. Last note from [facility] is in media. Please advise.  
Potassium 2.9

## 2024-01-24 ENCOUNTER — APPOINTMENT (OUTPATIENT)
Dept: ELECTROPHYSIOLOGY | Facility: CLINIC | Age: 64
End: 2024-01-24
Payer: MEDICARE

## 2024-01-24 VITALS
OXYGEN SATURATION: 97 % | HEART RATE: 77 BPM | HEIGHT: 67 IN | DIASTOLIC BLOOD PRESSURE: 100 MMHG | SYSTOLIC BLOOD PRESSURE: 170 MMHG | WEIGHT: 178 LBS | BODY MASS INDEX: 27.94 KG/M2

## 2024-01-24 DIAGNOSIS — N18.9 CHRONIC KIDNEY DISEASE, UNSPECIFIED: ICD-10-CM

## 2024-01-24 DIAGNOSIS — I48.92 UNSPECIFIED ATRIAL FLUTTER: ICD-10-CM

## 2024-01-24 PROCEDURE — 99214 OFFICE O/P EST MOD 30 MIN: CPT

## 2024-01-24 PROCEDURE — 93000 ELECTROCARDIOGRAM COMPLETE: CPT

## 2024-01-24 NOTE — HISTORY OF PRESENT ILLNESS
[FreeTextEntry1] : Mr. Cummins is a pleasant 63-year-old male here for follow up today. The patient has a history of aortic dissection s/p repair, CAD s/p CABG, aortic and mitral valve disease s/p AVR and MV repair, bowel ischemia s/p resection, ESRD previously on dialysis, HTN, AVMs s/p cauterization, and atrial fibrillation/flutter s/p PVI+CTI+mitral line in 9/2021, PVCs, recently spontaneous subdural/subarachnoid hemorrhage and mitral valve endocarditis.   The patient was admitted to [facility] in 6/2023 after being found down at home and subsequently noted to have sudural/subarachnoid hemorrhage. Eliquis was stopped. TTE at the time noted a possible echogenicity on the mitral valve but MUSTAPHA was deferred because of his condition. He was eventually discharged home off eliquis. Neurosurgery eventually cleared him to resume eliquis. Eliquis 5 mg bid was resumed on 8/3/23. MUSTAPHA was performed as outpatient given suspicion for MV density. No vegetation was seen. BLAINE measurements were taken. LVEF was reported as 60%. He was referred for a LAAO/Watchman evaluation and agreed to undergo the procedure when I last saw him in office in 8/2023. Unfortunately, the patient developed mitral valve endocarditis and was hospitalized for it in 9/2023. He completed 6 weeks of antibiotic therapy, and a repeat MUSTAPHA was negative for any vegetations. AV fistula thought to be responsible for the infection has now been removed. He is here again to discuss the BLAINE occlusion/Watchman implant. The patient denies any symptoms of palpitations, shortness of breath, dizziness, chest pain, syncope or extremity edema. He is currently on eliquis 5 mg bid.

## 2024-01-24 NOTE — DISCUSSION/SUMMARY
[EKG obtained to assist in diagnosis and management of assessed problem(s)] : EKG obtained to assist in diagnosis and management of assessed problem(s) [FreeTextEntry1] : In summary, the patient is a 63-year-old male with a history of atrial fibrillation/flutter s/p ablation, CAD s/p CABG, valve disease s/p AVR and MV repair, mild LV dysfunction (now recovered), aortic dissection s/p repair, CKD (not on dialysis), small bowel ischemia s/p resection and recently subdural/subarachnoid hemorrhage while on eliquis. His CHADSVASC score is 2. On his previous visit he had agreed to undergo left atrial appendage occlusion/Watchman implant. Unfortunately, he later developed mitral valve endocarditis and LAAO procedure had to be postponed. He has now completed antibiotic therapy for the endocarditis and MUSTAPHA demonstrated resolution as well. He now wishes to proceed with previously planned LAAO procedure. We again discussed the details of the procedure including potential complications. All his questions were answered. He has CKD - a pre-procedural CT cannot be obtained for LA anatomy.

## 2024-02-07 NOTE — ED PROVIDER NOTE - NS_EDPROVIDERDISPOUSERTYPE_ED_A_ED
nightly, Disp: , Rfl:   No Known Allergies     Review of Systems   All other systems reviewed and are negative.         Physical Exam  Vitals and nursing note reviewed.   Chest:   Breasts:     Right: No swelling, bleeding, inverted nipple, mass, nipple discharge, skin change or tenderness.      Left: No swelling, bleeding, inverted nipple, mass, nipple discharge, skin change or tenderness.   Lymphadenopathy:      Upper Body:      Right upper body: No axillary adenopathy.      Left upper body: No axillary adenopathy.        BREAST ULTRASOUND, Preop planning  Indication:preop planning  right Breast upper outer quadrant   Technique:  The area was scanned using a high-frequency linear-array near-field transducer  Findings: clip not seen some biopsy change seen  Impression: Biopsy site not visible with ultrasound  Disposition:  Will schedule breast mri then magseed  lumpectomy     ASSESSMENT and PLAN   Diagnosis Orders   1. Ductal carcinoma in situ (DCIS) of right breast  BIS EXTRACELLULAR FLUID MILTON LYMPHEDEMA ASSMNT    MRI BREAST BILATERAL W WO CONTRAST      59 yo female with right breast DCIS stage 0 Er negative Pr negative grade 3       90 minutes were spent face-to-face with the patient during this encounter and 90% of that time was spent on counseling and coordination of care.    1. Discussed lumpectomy and radiation vs mastectomy.  Discussed reconstruction.  MRI ordered to see if patient is a candidate for a lumpectomy.  2. Discussed sentinel lymph node biopsy.  Not for dcis  3. Discussed external beam radiation.    4. Discussed hormone therapy.  5. Discussed the possibility of chemotherapy.  Not for dcis.    Plan is breast mri then right magseed guided lumpectomy  
Attending Attestation (For Attendings USE Only)...

## 2024-02-12 ENCOUNTER — APPOINTMENT (OUTPATIENT)
Dept: GASTROENTEROLOGY | Facility: CLINIC | Age: 64
End: 2024-02-12

## 2024-03-19 ENCOUNTER — APPOINTMENT (OUTPATIENT)
Dept: CARDIOLOGY | Facility: CLINIC | Age: 64
End: 2024-03-19

## 2024-03-19 RX ORDER — ISOSORBIDE MONONITRATE 30 MG/1
30 TABLET, EXTENDED RELEASE ORAL DAILY
Qty: 90 | Refills: 1 | Status: ACTIVE | COMMUNITY
Start: 2023-07-13 | End: 1900-01-01

## 2024-04-04 ENCOUNTER — OUTPATIENT (OUTPATIENT)
Dept: OUTPATIENT SERVICES | Facility: HOSPITAL | Age: 64
LOS: 1 days | End: 2024-04-04
Payer: MEDICARE

## 2024-04-04 VITALS
DIASTOLIC BLOOD PRESSURE: 100 MMHG | HEART RATE: 72 BPM | OXYGEN SATURATION: 98 % | SYSTOLIC BLOOD PRESSURE: 200 MMHG | TEMPERATURE: 98 F | HEIGHT: 68 IN | RESPIRATION RATE: 16 BRPM | WEIGHT: 169.76 LBS

## 2024-04-04 DIAGNOSIS — Z95.1 PRESENCE OF AORTOCORONARY BYPASS GRAFT: Chronic | ICD-10-CM

## 2024-04-04 DIAGNOSIS — Z90.49 ACQUIRED ABSENCE OF OTHER SPECIFIED PARTS OF DIGESTIVE TRACT: Chronic | ICD-10-CM

## 2024-04-04 DIAGNOSIS — Z95.2 PRESENCE OF PROSTHETIC HEART VALVE: Chronic | ICD-10-CM

## 2024-04-04 DIAGNOSIS — Z01.818 ENCOUNTER FOR OTHER PREPROCEDURAL EXAMINATION: ICD-10-CM

## 2024-04-04 DIAGNOSIS — Z86.79 PERSONAL HISTORY OF OTHER DISEASES OF THE CIRCULATORY SYSTEM: Chronic | ICD-10-CM

## 2024-04-04 DIAGNOSIS — I77.0 ARTERIOVENOUS FISTULA, ACQUIRED: Chronic | ICD-10-CM

## 2024-04-04 LAB
ANION GAP SERPL CALC-SCNC: 16 MMOL/L — SIGNIFICANT CHANGE UP (ref 5–17)
APTT BLD: 37.8 SEC — HIGH (ref 24.5–35.6)
BLD GP AB SCN SERPL QL: SIGNIFICANT CHANGE UP
BUN SERPL-MCNC: 57.2 MG/DL — HIGH (ref 8–20)
CALCIUM SERPL-MCNC: 8.5 MG/DL — SIGNIFICANT CHANGE UP (ref 8.4–10.5)
CHLORIDE SERPL-SCNC: 105 MMOL/L — SIGNIFICANT CHANGE UP (ref 96–108)
CO2 SERPL-SCNC: 22 MMOL/L — SIGNIFICANT CHANGE UP (ref 22–29)
CREAT SERPL-MCNC: 4.78 MG/DL — HIGH (ref 0.5–1.3)
EGFR: 13 ML/MIN/1.73M2 — LOW
GLUCOSE SERPL-MCNC: 94 MG/DL — SIGNIFICANT CHANGE UP (ref 70–99)
HCT VFR BLD CALC: 22.3 % — LOW (ref 39–50)
HGB BLD-MCNC: 7.1 G/DL — LOW (ref 13–17)
INR BLD: 1.2 RATIO — HIGH (ref 0.85–1.18)
MAGNESIUM SERPL-MCNC: 1.8 MG/DL — SIGNIFICANT CHANGE UP (ref 1.6–2.6)
MCHC RBC-ENTMCNC: 29.5 PG — SIGNIFICANT CHANGE UP (ref 27–34)
MCHC RBC-ENTMCNC: 31.8 GM/DL — LOW (ref 32–36)
MCV RBC AUTO: 92.5 FL — SIGNIFICANT CHANGE UP (ref 80–100)
PLATELET # BLD AUTO: 136 K/UL — LOW (ref 150–400)
POTASSIUM SERPL-MCNC: 4 MMOL/L — SIGNIFICANT CHANGE UP (ref 3.5–5.3)
POTASSIUM SERPL-SCNC: 4 MMOL/L — SIGNIFICANT CHANGE UP (ref 3.5–5.3)
PROTHROM AB SERPL-ACNC: 13.2 SEC — HIGH (ref 9.5–13)
RBC # BLD: 2.41 M/UL — LOW (ref 4.2–5.8)
RBC # FLD: 13.5 % — SIGNIFICANT CHANGE UP (ref 10.3–14.5)
SODIUM SERPL-SCNC: 143 MMOL/L — SIGNIFICANT CHANGE UP (ref 135–145)
WBC # BLD: 5.72 K/UL — SIGNIFICANT CHANGE UP (ref 3.8–10.5)
WBC # FLD AUTO: 5.72 K/UL — SIGNIFICANT CHANGE UP (ref 3.8–10.5)

## 2024-04-04 PROCEDURE — 93010 ELECTROCARDIOGRAM REPORT: CPT

## 2024-04-04 NOTE — H&P PST ADULT - NEGATIVE NEUROLOGICAL SYMPTOMS
no transient paralysis/no generalized seizures/no focal seizures/no tremors/no vertigo/no difficulty walking

## 2024-04-04 NOTE — H&P PST ADULT - HISTORY OF PRESENT ILLNESS
Department of Cardiology                                                                  [facility]/[place] [address]                                                            Telephone: [phone]. Fax:[phone]                                                                                    [facility] H & P     Chief complaint:    Patient is a 63y old  Male who presents with a chief complaint of     HPI:      Symptoms:        Angina (Class):        Ischemic Symptoms:     Heart Failure:        Systolic/Diastolic/Combined:        NYHA Class (within 2 weeks):     Assessment of LVEF:       EF:        Assessed by:        Date:     Prior Cardiac Interventions:  Prior EP Procedures:    Prior IC Procedures:    Prior CTS:             Noninvasive Testing:   Stress Test: Date:        Protocol:        Duration of Exercise:        Symptoms:        EKG Changes:        DTS:        Myocardial Imaging:        Risk Assessment:     Echo:    Antianginal Therapies:        Beta Blockers:         Calcium Channel Blockers:        Long Acting Nitrates:        Ranexa:     Associated Risk Factors:        Cerebrovascular Disease: N/A       Chronic Lung Disease: N/A       Peripheral Arterial Disease: N/A       Chronic Kidney Disease (if yes, what is GFR): N/A       Uncontrolled Diabetes (if yes, what is HgbA1C or FBS): N/A       Poorly Controlled Hypertension (if yes, what is SBP): N/A       Morbid Obesity (if yes, what is BMI): N/A       History of Recent Ventricular Arrhythmia: N/A       Inability to Ambulate Safely: N/A       Need for Therapeutic Anticoagulation: N/A       Antiplatelet or Contrast Allergy: N/A             Antiarrhythmic Therapies:      Anticoagulation Therapies:      	  ROS: as stated above, otherwise negative      	    LABS:	 	                                                                                                                           Department of Cardiology                                                                  [facility]/[place] [address]                                                            Telephone: [phone]. Fax:[phone]                                                                                    PST H & P     Chief complaint:    Patient is a 63y old  Male who presents with a chief complaint of Afib s/p CVA x 2.    HPI: Narrative:   62-year-old male with h/o HTN, aortic dissection s/p repair, CAD and VHD with prior CABG/bio-AVR/bio-MVR, bowel ischemia treated with resection, ESRD on dialysis, AVMs s/p cauterization, atrial fibrillation/flutter with previous RFA, PVCs, admitted to [facility] in 6/2023 after being found down at home and subsequently found to have sudural/subarachnoid hemorrhage, Eliquis was stopped and TTE at the time noted a possible echogenicity on the mitral valve, MUSTAPHA was deferred because of his clinical condition, ultimately discharged to rehab, followed by neurosurgery and restarted Eliquis 8/2023, MUSTAPHA performed 9/27 with +MV vegetation and source thought to be AV graft which was removed 10/1/23, treated with 6 week A/B course, now presenting for follow-up MUSTAPHA.       Symptoms:        Angina (Class):        Ischemic Symptoms:     Heart Failure:        Systolic/Diastolic/Combined:        NYHA Class (within 2 weeks):     Assessment of LVEF:       EF:        Assessed by:        Date:     Prior Cardiac Interventions:  Prior EP Procedures:    Prior IC Procedures:    Prior CTS:             Noninvasive Testing:   Stress Test: Date:        Protocol:        Duration of Exercise:        Symptoms:        EKG Changes:        DTS:        Myocardial Imaging:        Risk Assessment:     Echo:    Antianginal Therapies:        Beta Blockers:         Calcium Channel Blockers:        Long Acting Nitrates:        Ranexa:     Associated Risk Factors:        Cerebrovascular Disease: N/A       Chronic Lung Disease: N/A       Peripheral Arterial Disease: N/A       Chronic Kidney Disease (if yes, what is GFR): N/A       Uncontrolled Diabetes (if yes, what is HgbA1C or FBS): N/A       Poorly Controlled Hypertension (if yes, what is SBP): N/A       Morbid Obesity (if yes, what is BMI): N/A       History of Recent Ventricular Arrhythmia: N/A       Inability to Ambulate Safely: N/A       Need for Therapeutic Anticoagulation: N/A       Antiplatelet or Contrast Allergy: N/A             Antiarrhythmic Therapies:      Anticoagulation Therapies:      	  ROS: as stated above, otherwise negative      	    LABS:	 	                                                                                                                                                                                                         PST H & P     Chief complaint:    Patient is a 63y old  Male who presents with a chief complaint of Afib s/p CVA x 2.    HPI: Narrative:   62-year-old male with h/o HTN, aortic dissection s/p repair, CAD and VHD with prior CABG/bio-AVR/bio-MVR, bowel ischemia treated with resection, ESRD on dialysis (pt no longer on dialysis), AVMs s/p cauterization, atrial fibrillation/flutter with previous RFA, PVCs, admitted to [facility] in 6/2023 after being found down at home and subsequently found to have sudural/subarachnoid hemorrhage, Eliquis was stopped and TTE at the time noted a possible echogenicity on the mitral valve, MUSTAPHA was deferred because of his clinical condition, ultimately discharged to rehab, followed by neurosurgery and restarted Eliquis 8/2023, MUSTAPHA performed 9/27/23 with +MV vegetation and source thought to be AV graft which was removed 10/1/23, treated with 6 week A/B course, repeat MUSTAPHA in December 2023 negative for any vegetations. AV fistula thought to be responsible for the infection has sicce been removed. He is now presenting for MUSTAPHA/LAAO.    < from: MUSTAPHA W or WO Ultrasound Enhancing Agent (12.12.23 @ 10:12) >  CONCLUSIONS:      1. Left ventricular systolic function is normal.   2. The left atrium is severely dilated.   3. A bioprosthetic valve replacement present in the aortic position. No prosthetic aortic stenosis. No intravalvular regurgitation No paravalvular regurgitation.   4. Bioprosthetic valve present. in the mitral position. Well seated prosthetic mitral valve with normal function with normal leaflet excursion. Transvalvular mitral gradients are normal. No prosthetic mitral stenosis. There is No intravalvular mitral regurgitation. Moderate paravalvular mitral (one jet) regurgitation, originating at 9 o'clock, directed centrally.   5. No pericardial effusion seen.   6. No echocardiographic evidenceof vegetations.   7. Compared to the transesophageal echocardiogram performed on 9/1/2023 Mitral valve vegetation is resolved.    < end of copied text >           Antiarrhythmic Therapies: Pt is s/p failed cardioversion.     Anticoagulation Therapies:  Eliquis 5 mg po twice daily    	  ROS: as stated above, otherwise negative                        	                                                                                                                                                                                                         PST H & P     Chief complaint:    Patient is a 63y old  Male who presents with a chief complaint of Afib s/p CVA x 2.    HPI: Narrative:   62-year-old male with h/o HTN, aortic dissection s/p repair, CAD and VHD with prior CABG/bio-AVR/bio-MVR, bowel ischemia treated with resection, ESRD on dialysis (pt no longer on dialysis), AVMs s/p cauterization, atrial fibrillation/flutter with previous RFA, PVCs, admitted to [facility] in 6/2023 after being found down at home and subsequently found to have sudural/subarachnoid hemorrhage, Eliquis was stopped and TTE at the time noted a possible echogenicity on the mitral valve, MUSTAPHA was deferred because of his clinical condition, ultimately discharged to rehab, followed by neurosurgery and restarted Eliquis 8/2023, MUSTAPHA performed 9/27/23 with +MV vegetation and source thought to be AV graft which was removed 10/1/23, treated with 6 week A/B course, repeat MUSTAPHA in December 2023 negative for any vegetations. AV fistula thought to be responsible for the infection has sicce been removed. He is now presenting for MUSTAPHA/LAAO.    < from: MUSTAPHA W or WO Ultrasound Enhancing Agent (12.12.23 @ 10:12) >  CONCLUSIONS:      1. Left ventricular systolic function is normal.   2. The left atrium is severely dilated.   3. A bioprosthetic valve replacement present in the aortic position. No prosthetic aortic stenosis. No intravalvular regurgitation No paravalvular regurgitation.   4. Bioprosthetic valve present. in the mitral position. Well seated prosthetic mitral valve with normal function with normal leaflet excursion. Transvalvular mitral gradients are normal. No prosthetic mitral stenosis. There is No intravalvular mitral regurgitation. Moderate paravalvular mitral (one jet) regurgitation, originating at 9 o'clock, directed centrally.   5. No pericardial effusion seen.   6. No echocardiographic evidenceof vegetations.   7. Compared to the transesophageal echocardiogram performed on 9/1/2023 Mitral valve vegetation is resolved.    < end of copied text >           Antiarrhythmic Therapies: Pt is s/p failed cardioversion.     Anticoagulation Therapies:  Eliquis 5 mg po twice daily    	  ROS: as stated above, otherwise negative      LABS:                          7.1    5.72  )-----------( 136      ( 04 Apr 2024 09:56 )             22.3     04-04    143  |  105  |  57.2<H>  ----------------------------<  94  4.0   |  22.0  |  4.78<H>    Ca    8.5      04 Apr 2024 09:56  Mg     1.8     04-04  GFR 13      PT/INR - ( 04 Apr 2024 09:56 )   PT: 13.2 sec;   INR: 1.20 ratio         PTT - ( 04 Apr 2024 09:56 )  PTT:37.8 sec

## 2024-04-04 NOTE — H&P PST ADULT - PSYCHIATRIC
Gen: Well appearing in NAD  Head: NC/AT  Neck: trachea midline  Resp:  No distress  Ext: left ring finger: FROM, sensation intact, cap refill <3  Neuro:  A&O appears non focal  Skin:  [id number] v shaped flap laceration superficial on radial aspect of ring finger. no bleeding  Psych:  Normal affect and mood details… normal/normal affect/alert and oriented x3/normal behavior/anxious

## 2024-04-04 NOTE — H&P PST ADULT - MUSCULOSKELETAL
details… normal/ROM intact/normal gait/strength 5/5 bilateral upper extremities/strength 5/5 bilateral lower extremities/decreased strength

## 2024-04-04 NOTE — H&P PST ADULT - ASSESSMENT
HPI:        ASA  Mallampati  GFR  Creat  Bleeding Risk      Plan/Recommendations:   -plan for **** on ***  -patient seen and examined  -ECG and Labs reviewed  -NPO after midnight prior with exception of sip of water with morning medications  -Continue/Hold ***  -Hold Jardiance, Farxiga, Synjardy, Steglatro, Xigduo XR, Irvokana for 3 days pre procedure  -Pre-procedure instructions provided (verbal & written)   -Supplies and verbal/written Instructions for pre-surgical chlorhexadine shower provided*****  -Consent to be obtained by attending electrophysiologist on the scheduled procedure date           HPI: 2-year-old male with h/o HTN, aortic dissection s/p repair, CAD and VHD with prior CABG/bio-AVR/bio-MVR, and Afib currently on Eliquis is also with hx of  sudural/subarachnoid hemorrhage and now presents for MUSTAPHA/LAAO with anticipation of being able to discontinue Eliquis.    Plan/Recommendations:   -plan for MUSTAPHA/LAAO on *4/12/24  -patient seen and examined  -ECG and Labs reviewed  -Clonidine increased to 0.2 mg BID secondary to elevated /100  -NPO after midnight prior with exception of sip of water with morning medications  -Hold Eliquis after the evening dose on 4/10/2024.  -Pre-procedure instructions provided (verbal & written)   -Consent to be obtained by attending electrophysiologist on the scheduled procedure date           HPI: 2-year-old male with h/o HTN, aortic dissection s/p repair, CAD and VHD with prior CABG/bio-AVR/bio-MVR, and Afib currently on Eliquis is also with hx of  sudural/subarachnoid hemorrhage and now presents for MUSTAPHA/LAAO with anticipation of being able to discontinue Eliquis.    Plan/Recommendations:   -plan for MUSTAPHA/LAAO on *4/12/24  -patient seen and examined  -ECG and Labs reviewed  -Clonidine increased to 0.2 mg BID secondary to elevated /100  -NPO after midnight prior with exception of sip of water with morning medications  -Hold Eliquis after the evening dose on 4/10/2024. (As d/W Dr Mosqueda)  -Pre-procedure instructions provided (verbal & written)   -Consent to be obtained by attending electrophysiologist on the scheduled procedure date

## 2024-04-11 PROCEDURE — G0463: CPT

## 2024-04-11 PROCEDURE — 93005 ELECTROCARDIOGRAM TRACING: CPT

## 2024-04-11 PROCEDURE — 86922 COMPATIBILITY TEST ANTIGLOB: CPT

## 2024-04-12 ENCOUNTER — INPATIENT (INPATIENT)
Facility: HOSPITAL | Age: 64
LOS: 9 days | Discharge: HOME | DRG: 684 | End: 2024-04-22
Attending: FAMILY MEDICINE | Admitting: FAMILY MEDICINE
Payer: COMMERCIAL

## 2024-04-12 VITALS
RESPIRATION RATE: 20 BRPM | OXYGEN SATURATION: 98 % | TEMPERATURE: 98 F | HEIGHT: 68 IN | HEART RATE: 87 BPM | SYSTOLIC BLOOD PRESSURE: 244 MMHG | WEIGHT: 165.35 LBS | DIASTOLIC BLOOD PRESSURE: 101 MMHG

## 2024-04-12 DIAGNOSIS — Z95.2 PRESENCE OF PROSTHETIC HEART VALVE: Chronic | ICD-10-CM

## 2024-04-12 DIAGNOSIS — I16.0 HYPERTENSIVE URGENCY: ICD-10-CM

## 2024-04-12 DIAGNOSIS — Z95.1 PRESENCE OF AORTOCORONARY BYPASS GRAFT: Chronic | ICD-10-CM

## 2024-04-12 DIAGNOSIS — Z86.79 PERSONAL HISTORY OF OTHER DISEASES OF THE CIRCULATORY SYSTEM: Chronic | ICD-10-CM

## 2024-04-12 DIAGNOSIS — N17.9 ACUTE KIDNEY FAILURE, UNSPECIFIED: ICD-10-CM

## 2024-04-12 DIAGNOSIS — Z86.79 PERSONAL HISTORY OF OTHER DISEASES OF THE CIRCULATORY SYSTEM: ICD-10-CM

## 2024-04-12 DIAGNOSIS — R56.9 UNSPECIFIED CONVULSIONS: ICD-10-CM

## 2024-04-12 DIAGNOSIS — Z90.49 ACQUIRED ABSENCE OF OTHER SPECIFIED PARTS OF DIGESTIVE TRACT: Chronic | ICD-10-CM

## 2024-04-12 DIAGNOSIS — I63.9 CEREBRAL INFARCTION, UNSPECIFIED: ICD-10-CM

## 2024-04-12 DIAGNOSIS — I77.0 ARTERIOVENOUS FISTULA, ACQUIRED: Chronic | ICD-10-CM

## 2024-04-12 LAB
ALBUMIN SERPL ELPH-MCNC: 3.8 G/DL — SIGNIFICANT CHANGE UP (ref 3.3–5.2)
ALP SERPL-CCNC: 74 U/L — SIGNIFICANT CHANGE UP (ref 40–120)
ALT FLD-CCNC: 13 U/L — SIGNIFICANT CHANGE UP
ANION GAP SERPL CALC-SCNC: 19 MMOL/L — HIGH (ref 5–17)
APTT BLD: 33.1 SEC — SIGNIFICANT CHANGE UP (ref 24.5–35.6)
AST SERPL-CCNC: 11 U/L — SIGNIFICANT CHANGE UP
BASOPHILS # BLD AUTO: 0.05 K/UL — SIGNIFICANT CHANGE UP (ref 0–0.2)
BASOPHILS NFR BLD AUTO: 0.8 % — SIGNIFICANT CHANGE UP (ref 0–2)
BILIRUB SERPL-MCNC: 0.9 MG/DL — SIGNIFICANT CHANGE UP (ref 0.4–2)
BLD GP AB SCN SERPL QL: SIGNIFICANT CHANGE UP
BUN SERPL-MCNC: 55.8 MG/DL — HIGH (ref 8–20)
CALCIUM SERPL-MCNC: 8.6 MG/DL — SIGNIFICANT CHANGE UP (ref 8.4–10.5)
CHLORIDE SERPL-SCNC: 101 MMOL/L — SIGNIFICANT CHANGE UP (ref 96–108)
CO2 SERPL-SCNC: 22 MMOL/L — SIGNIFICANT CHANGE UP (ref 22–29)
CREAT SERPL-MCNC: 5.12 MG/DL — HIGH (ref 0.5–1.3)
EGFR: 12 ML/MIN/1.73M2 — LOW
EOSINOPHIL # BLD AUTO: 0.06 K/UL — SIGNIFICANT CHANGE UP (ref 0–0.5)
EOSINOPHIL NFR BLD AUTO: 0.9 % — SIGNIFICANT CHANGE UP (ref 0–6)
GLUCOSE SERPL-MCNC: 96 MG/DL — SIGNIFICANT CHANGE UP (ref 70–99)
HCT VFR BLD CALC: 23.9 % — LOW (ref 39–50)
HCT VFR BLD CALC: 24.1 % — LOW (ref 39–50)
HGB BLD-MCNC: 7.9 G/DL — LOW (ref 13–17)
HGB BLD-MCNC: 8.1 G/DL — LOW (ref 13–17)
IMM GRANULOCYTES NFR BLD AUTO: 0.3 % — SIGNIFICANT CHANGE UP (ref 0–0.9)
INR BLD: 1.33 RATIO — HIGH (ref 0.85–1.18)
LIDOCAIN IGE QN: 27 U/L — SIGNIFICANT CHANGE UP (ref 22–51)
LYMPHOCYTES # BLD AUTO: 0.93 K/UL — LOW (ref 1–3.3)
LYMPHOCYTES # BLD AUTO: 14.4 % — SIGNIFICANT CHANGE UP (ref 13–44)
MAGNESIUM SERPL-MCNC: 1.7 MG/DL — SIGNIFICANT CHANGE UP (ref 1.6–2.6)
MCHC RBC-ENTMCNC: 29 PG — SIGNIFICANT CHANGE UP (ref 27–34)
MCHC RBC-ENTMCNC: 29.8 PG — SIGNIFICANT CHANGE UP (ref 27–34)
MCHC RBC-ENTMCNC: 32.8 GM/DL — SIGNIFICANT CHANGE UP (ref 32–36)
MCHC RBC-ENTMCNC: 33.9 GM/DL — SIGNIFICANT CHANGE UP (ref 32–36)
MCV RBC AUTO: 87.9 FL — SIGNIFICANT CHANGE UP (ref 80–100)
MCV RBC AUTO: 88.6 FL — SIGNIFICANT CHANGE UP (ref 80–100)
MONOCYTES # BLD AUTO: 0.46 K/UL — SIGNIFICANT CHANGE UP (ref 0–0.9)
MONOCYTES NFR BLD AUTO: 7.1 % — SIGNIFICANT CHANGE UP (ref 2–14)
NEUTROPHILS # BLD AUTO: 4.92 K/UL — SIGNIFICANT CHANGE UP (ref 1.8–7.4)
NEUTROPHILS NFR BLD AUTO: 76.5 % — SIGNIFICANT CHANGE UP (ref 43–77)
PLATELET # BLD AUTO: 130 K/UL — LOW (ref 150–400)
PLATELET # BLD AUTO: 152 K/UL — SIGNIFICANT CHANGE UP (ref 150–400)
POTASSIUM SERPL-MCNC: 3 MMOL/L — LOW (ref 3.5–5.3)
POTASSIUM SERPL-SCNC: 3 MMOL/L — LOW (ref 3.5–5.3)
PROT SERPL-MCNC: 7 G/DL — SIGNIFICANT CHANGE UP (ref 6.6–8.7)
PROTHROM AB SERPL-ACNC: 14.6 SEC — HIGH (ref 9.5–13)
RBC # BLD: 2.72 M/UL — LOW (ref 4.2–5.8)
RBC # BLD: 2.72 M/UL — LOW (ref 4.2–5.8)
RBC # FLD: 13.5 % — SIGNIFICANT CHANGE UP (ref 10.3–14.5)
RBC # FLD: 13.7 % — SIGNIFICANT CHANGE UP (ref 10.3–14.5)
SODIUM SERPL-SCNC: 142 MMOL/L — SIGNIFICANT CHANGE UP (ref 135–145)
WBC # BLD: 5.91 K/UL — SIGNIFICANT CHANGE UP (ref 3.8–10.5)
WBC # BLD: 6.44 K/UL — SIGNIFICANT CHANGE UP (ref 3.8–10.5)
WBC # FLD AUTO: 5.91 K/UL — SIGNIFICANT CHANGE UP (ref 3.8–10.5)
WBC # FLD AUTO: 6.44 K/UL — SIGNIFICANT CHANGE UP (ref 3.8–10.5)

## 2024-04-12 PROCEDURE — 76775 US EXAM ABDO BACK WALL LIM: CPT | Mod: 26

## 2024-04-12 PROCEDURE — 70450 CT HEAD/BRAIN W/O DYE: CPT | Mod: 26,MC

## 2024-04-12 PROCEDURE — 99223 1ST HOSP IP/OBS HIGH 75: CPT

## 2024-04-12 PROCEDURE — 93010 ELECTROCARDIOGRAM REPORT: CPT

## 2024-04-12 PROCEDURE — 99291 CRITICAL CARE FIRST HOUR: CPT | Mod: GC

## 2024-04-12 RX ORDER — SODIUM CHLORIDE 9 MG/ML
1000 INJECTION INTRAMUSCULAR; INTRAVENOUS; SUBCUTANEOUS
Refills: 0 | Status: DISCONTINUED | OUTPATIENT
Start: 2024-04-12 | End: 2024-04-14

## 2024-04-12 RX ORDER — LABETALOL HCL 100 MG
10 TABLET ORAL EVERY 6 HOURS
Refills: 0 | Status: DISCONTINUED | OUTPATIENT
Start: 2024-04-12 | End: 2024-04-17

## 2024-04-12 RX ORDER — POTASSIUM CHLORIDE 20 MEQ
40 PACKET (EA) ORAL ONCE
Refills: 0 | Status: COMPLETED | OUTPATIENT
Start: 2024-04-12 | End: 2024-04-12

## 2024-04-12 RX ORDER — HYDRALAZINE HCL 50 MG
10 TABLET ORAL EVERY 4 HOURS
Refills: 0 | Status: DISCONTINUED | OUTPATIENT
Start: 2024-04-12 | End: 2024-04-22

## 2024-04-12 RX ORDER — LEVETIRACETAM 250 MG/1
500 TABLET, FILM COATED ORAL
Refills: 0 | Status: DISCONTINUED | OUTPATIENT
Start: 2024-04-12 | End: 2024-04-22

## 2024-04-12 RX ORDER — TAMSULOSIN HYDROCHLORIDE 0.4 MG/1
0.4 CAPSULE ORAL AT BEDTIME
Refills: 0 | Status: DISCONTINUED | OUTPATIENT
Start: 2024-04-12 | End: 2024-04-22

## 2024-04-12 RX ORDER — ISOSORBIDE MONONITRATE 60 MG/1
30 TABLET, EXTENDED RELEASE ORAL DAILY
Refills: 0 | Status: DISCONTINUED | OUTPATIENT
Start: 2024-04-12 | End: 2024-04-22

## 2024-04-12 RX ORDER — AMLODIPINE BESYLATE 2.5 MG/1
10 TABLET ORAL DAILY
Refills: 0 | Status: DISCONTINUED | OUTPATIENT
Start: 2024-04-12 | End: 2024-04-22

## 2024-04-12 RX ORDER — AMLODIPINE BESYLATE 2.5 MG/1
10 TABLET ORAL ONCE
Refills: 0 | Status: COMPLETED | OUTPATIENT
Start: 2024-04-12 | End: 2024-04-12

## 2024-04-12 RX ORDER — POTASSIUM CHLORIDE 20 MEQ
40 PACKET (EA) ORAL ONCE
Refills: 0 | Status: DISCONTINUED | OUTPATIENT
Start: 2024-04-12 | End: 2024-04-12

## 2024-04-12 RX ORDER — ISOSORBIDE DINITRATE 5 MG/1
30 TABLET ORAL
Refills: 0 | Status: DISCONTINUED | OUTPATIENT
Start: 2024-04-12 | End: 2024-04-12

## 2024-04-12 RX ORDER — LEVETIRACETAM 250 MG/1
1000 TABLET, FILM COATED ORAL
Refills: 0 | Status: DISCONTINUED | OUTPATIENT
Start: 2024-04-12 | End: 2024-04-12

## 2024-04-12 RX ORDER — LANOLIN ALCOHOL/MO/W.PET/CERES
3 CREAM (GRAM) TOPICAL AT BEDTIME
Refills: 0 | Status: DISCONTINUED | OUTPATIENT
Start: 2024-04-12 | End: 2024-04-22

## 2024-04-12 RX ORDER — ISOSORBIDE DINITRATE 5 MG/1
1 TABLET ORAL
Refills: 0 | DISCHARGE

## 2024-04-12 RX ORDER — ATORVASTATIN CALCIUM 80 MG/1
40 TABLET, FILM COATED ORAL AT BEDTIME
Refills: 0 | Status: DISCONTINUED | OUTPATIENT
Start: 2024-04-12 | End: 2024-04-22

## 2024-04-12 RX ORDER — PANTOPRAZOLE SODIUM 20 MG/1
40 TABLET, DELAYED RELEASE ORAL EVERY 12 HOURS
Refills: 0 | Status: DISCONTINUED | OUTPATIENT
Start: 2024-04-12 | End: 2024-04-22

## 2024-04-12 RX ORDER — ACETAMINOPHEN 500 MG
650 TABLET ORAL EVERY 6 HOURS
Refills: 0 | Status: DISCONTINUED | OUTPATIENT
Start: 2024-04-12 | End: 2024-04-22

## 2024-04-12 RX ORDER — ONDANSETRON 8 MG/1
4 TABLET, FILM COATED ORAL EVERY 8 HOURS
Refills: 0 | Status: DISCONTINUED | OUTPATIENT
Start: 2024-04-12 | End: 2024-04-22

## 2024-04-12 RX ORDER — CARVEDILOL PHOSPHATE 80 MG/1
6.25 CAPSULE, EXTENDED RELEASE ORAL EVERY 12 HOURS
Refills: 0 | Status: DISCONTINUED | OUTPATIENT
Start: 2024-04-12 | End: 2024-04-19

## 2024-04-12 RX ORDER — SODIUM BICARBONATE 1 MEQ/ML
1 SYRINGE (ML) INTRAVENOUS
Refills: 0 | DISCHARGE

## 2024-04-12 RX ORDER — SODIUM BICARBONATE 1 MEQ/ML
1300 SYRINGE (ML) INTRAVENOUS
Refills: 0 | Status: DISCONTINUED | OUTPATIENT
Start: 2024-04-12 | End: 2024-04-12

## 2024-04-12 RX ADMIN — ATORVASTATIN CALCIUM 40 MILLIGRAM(S): 80 TABLET, FILM COATED ORAL at 21:47

## 2024-04-12 RX ADMIN — Medication 1300 MILLIGRAM(S): at 18:13

## 2024-04-12 RX ADMIN — Medication 40 MILLIEQUIVALENT(S): at 18:14

## 2024-04-12 RX ADMIN — CARVEDILOL PHOSPHATE 6.25 MILLIGRAM(S): 80 CAPSULE, EXTENDED RELEASE ORAL at 18:14

## 2024-04-12 RX ADMIN — TAMSULOSIN HYDROCHLORIDE 0.4 MILLIGRAM(S): 0.4 CAPSULE ORAL at 21:47

## 2024-04-12 RX ADMIN — LEVETIRACETAM 500 MILLIGRAM(S): 250 TABLET, FILM COATED ORAL at 18:13

## 2024-04-12 RX ADMIN — PANTOPRAZOLE SODIUM 40 MILLIGRAM(S): 20 TABLET, DELAYED RELEASE ORAL at 18:15

## 2024-04-12 RX ADMIN — Medication 0.2 MILLIGRAM(S): at 16:10

## 2024-04-12 RX ADMIN — AMLODIPINE BESYLATE 10 MILLIGRAM(S): 2.5 TABLET ORAL at 16:10

## 2024-04-12 NOTE — ED PROVIDER NOTE - ATTENDING CONTRIBUTION TO CARE
IJoseph MD, performed the initial face to face bedside interview with this patient regarding history of present illness, review of symptoms and relevant past medical, social and family history.  I completed an independent physical examination.  I was the initial provider who evaluated this patient. I have signed out the follow up of any pending tests (i.e. labs, radiological studies) to the resident.  I have communicated the patient’s plan of care and disposition with the resident.    I, Joseph Reece MD, spent 35 minutes of critical care time with this patient. This does not include time spent on separately reported billable procedures.    General: Awake, alert, lying in bed in NAD  HEENT: Normocephalic, atraumatic. No scleral icterus or conjunctival injection. EOMI. Moist mucous membranes. Oropharynx clear.   Neck:. Soft and supple.  Cardiac: RRR, Peripheral pulses 2+ and symmetric. No LE edema.  Resp: Lungs CTAB. No accessory muscle use  Abd: Soft, non-tender, non-distended. No guarding, rebound, or rigidity.  Back: Spine midline and non-tender.   Skin: Surgical scars. No rashes, abrasions, or lacerations.  Neuro: AO x 4. Moves all extremities symmetrically. Motor strength and sensation grossly intact.  Psych: Appropriate mood and affect

## 2024-04-12 NOTE — CONSULT NOTE ADULT - ASSESSMENT
62yo male with  PMH of aortic dissection s/p repair, CAD s/p CABG, aortic and mitral valve disease s/p AVR and MV repair, bowel ischemia s/p resection, ESRD previously on dialysis, HTN, AVMs s/p cauterization, and atrial fibrillation/flutter s/p PVI+CTI+mitral line in 9/2021, PVCs, recently spontaneous subdural/subarachnoid hemorrhage and mitral valve endocarditis.     He was to [facility] in 6/2023 with sudural/subarachnoid hemorrhage. Eliquis was stopped. Neurosurgery eventually cleared him to resume eliquis in 8/2023. He was referred for a LAAO/Watchman evaluation and agreed to undergo the procedure, however, he unfortunately developed mitral valve endocarditis and was hospitalized for it in 9/2023. He completed 6 weeks of antibiotic therapy, and a repeat MUSTAPHA was negative for any vegetations. AV fistula was thought to be responsible for the infection which has now been removed. He then presented to PST on 4/4/2024 for a BLAINE occlusion/Watchman implant and was noted to have SANTOS (Cr 5; baseline ~2) and anemia (Hg 7.1). He denies any bleeding issues or dark stools. Denies CP, palp, or SOB. He does report some recent balance issues, vertigo and episodes of N/V last night.    Recommendations:  - No plans for interventions this admission  - Repeat labs pending. If anemia and SANTOS confirmed would consult renal and GI  - Recommend obtaining Head CT given SBP>200 with symptoms of dizziness and vomiting  - Will follow peripherally   - Full recommendations to follow  64yo male with  PMH of aortic dissection s/p repair, CAD s/p CABG, aortic and mitral valve disease s/p AVR and MV repair, bowel ischemia s/p resection, ESRD previously on dialysis, HTN, AVMs s/p cauterization, and atrial fibrillation/flutter s/p PVI+CTI+mitral line in 9/2021, PVCs, recently spontaneous subdural/subarachnoid hemorrhage and mitral valve endocarditis.     He was to [facility] in 6/2023 with sudural/subarachnoid hemorrhage. Eliquis was stopped. Neurosurgery eventually cleared him to resume eliquis in 8/2023. He was referred for a LAAO/Watchman evaluation and agreed to undergo the procedure, however, he unfortunately developed mitral valve endocarditis and was hospitalized for it in 9/2023. He completed 6 weeks of antibiotic therapy, and a repeat MUSTAPHA was negative for any vegetations. AV fistula was thought to be responsible for the infection which has now been removed. He then presented to PST on 4/4/2024 for a BLAINE occlusion/Watchman implant and was noted to have SANTOS (Cr 5; baseline ~2) and anemia (Hg 7.1). He denies any bleeding issues or dark stools. Denies HA, fevers, chills, CP, palp, or SOB. He does report some recent balance issues, vertigo and episodes of N/V last night. He also reports chronic loose stools.     Recommendations:  - No plans for interventions this admission  - Repeat labs pending. If anemia and SANTOS confirmed would consult renal and GI  - Recommend obtaining Head CT given SBP>200 with symptoms of dizziness and vomiting  - Will follow peripherally   - Full recommendations to follow

## 2024-04-12 NOTE — ED ADULT TRIAGE NOTE - BP NONINVASIVE DIASTOLIC (MM HG)
Lidocaine 5% patches prescribed.   Please let her know
Patient states she has been dealing with pain in her leg. She saw a doctor who did a scan and found that she has chronic nerve problem which is most likely causing this pain. He recommended that patient do lidocaine patches to help with the pain. She got OTC lidocaine patches that were low dose,  but she is wondering if Dr. Wai Pacheco would be willing to prescribe slightly stronger ones. She also showed me the doctor's note and lidocaine patch package and asked if I could make copies for Dr. Wai Pacheco. Copy will be placed in provider's mailbox.
101

## 2024-04-12 NOTE — ED ADULT NURSE NOTE - OBJECTIVE STATEMENT
"I was getting presurgical testing done for a watchman and they said my kidney numbers were elevated and my blood numbers were low."

## 2024-04-12 NOTE — CONSULT NOTE ADULT - SUBJECTIVE AND OBJECTIVE BOX
[facility] CARDIAC ELECTROPHYSIOLOGY  [facility] Faculty Practice   Office: [address] [address]  Telephone: [phone] Fax:[phone]      HPI: 64yo male with  PMH of aortic dissection s/p repair, CAD s/p CABG, aortic and mitral valve disease s/p AVR and MV repair, bowel ischemia s/p resection, ESRD previously on dialysis, HTN, AVMs s/p cauterization, and atrial fibrillation/flutter s/p PVI+CTI+mitral line in 9/2021, PVCs, recently spontaneous subdural/subarachnoid hemorrhage and mitral valve endocarditis.     He was to [facility] in 6/2023 with sudural/subarachnoid hemorrhage. Eliquis was stopped. Neurosurgery eventually cleared him to resume eliquis in 8/2023. He was referred for a LAAO/Watchman evaluation and agreed to undergo the procedure, however, he unfortunately developed mitral valve endocarditis and was hospitalized for it in 9/2023. He completed 6 weeks of antibiotic therapy, and a repeat MUSTAPHA was negative for any vegetations. AV fistula was thought to be responsible for the infection which has now been removed. He then presented to [facility] on 4/4/2024 for a BLAINE occlusion/Watchman implant and was noted to have SANTOS (Cr 5; baseline ~2) and anemia (Hg 7.1). He denies any bleeding issues or dark stools. Denies CP, palp, or SOB. He does report some recent balance issues, vertigo and episodes of N/V last night.    In ED, noted to be hypertensive with . Labs pending     TELE: SR w/ prolonged AV delay, PVCs  EKG: SR w/ prolonged AV delay     PAST MEDICAL & SURGICAL HISTORY:  CHF (congestive heart failure)  CVA (cerebral vascular accident)  Seizures  last seizure 10 years ago  HTN (hypertension)  Hyperlipemia  Atrial fibrillation  Former smoker  History of cocaine use  H/O aortic dissection  s/p repair (2010), surgery complicated by bowel ischemia s/p bowel resection and ostomy with reversal  H/O aortic valve insufficiency  Mitral regurgitation  GIB (gastrointestinal bleeding)  CAD (coronary artery disease)  s/p PCI 1998  and CABG x 2 11/2020  H/O colectomy  S/P AVR (aortic valve replacement)  (t)AVR 11/2020 Dr Butler  S/P MVR (mitral valve replacement)  (t)MVR 11/2020 Dr Butler  H/O aortic dissection  Type A; emergent repair 2010  AV fistula      REVIEW OF SYSTEMS:  CONSTITUTIONAL: No fever, weight loss, or fatigue  EYES: No visual disturbances  NECK: No pain or stiffness  RESPIRATORY: No cough, wheezing, chills or hemoptysis; No shortness of breath  CARDIOVASCULAR: see HPI  GASTROINTESTINAL: No abdominal or epigastric pain. No nausea, vomiting, or hematemesis; No diarrhea or constipation. No melena or hematochezia.  NEUROLOGICAL: No headaches, memory loss, loss of strength, numbness, or tremors  SKIN: No itching, burning, rashes, or lesions   LYMPH NODES: No enlarged glands  ENDOCRINE: No heat or cold intolerance; No hair loss  PSYCHIATRIC: No depression, anxiety, mood swings, or difficulty sleeping  HEME/LYMPH: No easy bruising, or bleeding gums      MEDICATIONS  (STANDING):  amLODIPine   Tablet 10 milliGRAM(s) Oral Once  cloNIDine 0.2 milliGRAM(s) Oral Once    MEDICATIONS  (PRN):      Allergies  penicillin (Other)    SOCIAL HISTORY:  Former smoker  Former ETOH, sober  Prior cocaine use       FAMILY HISTORY:  FH: hypertension    Family history of cardiac disorder (Mother)  mother      Vital Signs Last 24 Hrs  T(C): 36.7 (12 Apr 2024 15:23), Max: 36.7 (12 Apr 2024 14:08)  T(F): 98 (12 Apr 2024 15:23), Max: 98 (12 Apr 2024 14:08)  HR: 71 (12 Apr 2024 15:23) (71 - 87)  BP: 194/143 (12 Apr 2024 15:23) (194/143 - 244/101)  RR: 20 (12 Apr 2024 15:23) (20 - 20)  SpO2: 100% (12 Apr 2024 15:23) (98% - 100%)    Parameters below as of 12 Apr 2024 14:08  Patient On (Oxygen Delivery Method): room air        Physical Exam:  Constitutional: AAOx3, NAD  Neck: supple, No JVD  Cardiovascular: +S1S2 RRR, +ALLIE  Pulmonary: CTA b/l, unlabored  Abdomen: +BS, soft NTTP  Extremities: no edema b/l  Neuro: non focal, speech clear, CORDOVA x 4    LABS:    Pending                RADIOLOGY & ADDITIONAL STUDIES:  TTE 9/2023  Summary:   1. Left ventricular ejection fraction, by visual estimation, is 50 to   55%.   2. Normal global left ventricular systolic function.   3. Diastolic function indeterminate.   4. Abnormal septal motion consistent with post-operative status.   5. Normal RV size with mildly reduced RV systolic function, inadequate   estimation of RVSP.   6. Bioprosthetic mitral valve present with a large mobile lesion (at   least 2.0 cm x 1.5 cm) prolapsing into and out of the LV-LA cavity,   hightly suggestive of vegetation. Mean transmitral valve gradient 4.8   mmHg (at HR of 60 bpm).   7. Bioprosthesis in the aortic position, mean gradient of 9 mmHg.   8. There is no evidence of pericardial effusion.   9. Compared to the prior TTE study from 5/23/23, lesion over bioMVR is   again noted and is now much larger and recommend MUSTAPHA study to confirm   vegetation and exclude other valvular involvement.    Juan Gomez DO Electronically signed on 9/26/2023 at 4:23:59 PM     [place] CARDIAC ELECTROPHYSIOLOGY  [facility] Faculty Practice   Office: [address] [address]  Telephone: [phone] Fax:[phone]      HPI: 64yo male with  PMH of aortic dissection s/p repair, CAD s/p CABG, aortic and mitral valve disease s/p AVR and MV repair, bowel ischemia s/p resection, ESRD previously on dialysis, HTN, AVMs s/p cauterization, and atrial fibrillation/flutter s/p PVI+CTI+mitral line in 9/2021, PVCs, recently spontaneous subdural/subarachnoid hemorrhage and mitral valve endocarditis.     He was to [facility] in 6/2023 with sudural/subarachnoid hemorrhage. Eliquis was stopped. Neurosurgery eventually cleared him to resume eliquis in 8/2023. He was referred for a LAAO/Watchman evaluation and agreed to undergo the procedure, however, he unfortunately developed mitral valve endocarditis and was hospitalized for it in 9/2023. He completed 6 weeks of antibiotic therapy, and a repeat MUSTAPHA was negative for any vegetations. AV fistula was thought to be responsible for the infection which has now been removed. He then presented to [facility] on 4/4/2024 for a BLAINE occlusion/Watchman implant and was noted to have SANTOS (Cr 5; baseline ~2) and anemia (Hg 7.1). He denies any bleeding issues or dark stools. Denies CP, palp, or SOB. He does report some recent balance issues, vertigo and episodes of N/V last night.    In ED, noted to be hypertensive with . Labs pending     TELE: SR w/ prolonged AV delay, PVCs  EKG: SR w/ prolonged AV delay     PAST MEDICAL & SURGICAL HISTORY:  CHF (congestive heart failure)  CVA (cerebral vascular accident)  Seizures  last seizure 10 years ago  HTN (hypertension)  Hyperlipemia  Atrial fibrillation  Former smoker  History of cocaine use  H/O aortic dissection  s/p repair (2010), surgery complicated by bowel ischemia s/p bowel resection and ostomy with reversal  H/O aortic valve insufficiency  Mitral regurgitation  GIB (gastrointestinal bleeding)  CAD (coronary artery disease)  s/p PCI 1998  and CABG x 2 11/2020  H/O colectomy  S/P AVR (aortic valve replacement)  (t)AVR 11/2020 Dr Butler  S/P MVR (mitral valve replacement)  (t)MVR 11/2020 Dr Butler  H/O aortic dissection  Type A; emergent repair 2010  AV fistula      REVIEW OF SYSTEMS:  CONSTITUTIONAL: No fever, weight loss, or fatigue  EYES: No visual disturbances  NECK: No pain or stiffness  RESPIRATORY: No cough, wheezing, chills or hemoptysis; No shortness of breath  CARDIOVASCULAR: see HPI  GASTROINTESTINAL: No abdominal or epigastric pain. No nausea, vomiting, or hematemesis; No diarrhea or constipation. No melena or hematochezia.  NEUROLOGICAL: No headaches, memory loss, loss of strength, numbness, or tremors  SKIN: No itching, burning, rashes, or lesions   LYMPH NODES: No enlarged glands  ENDOCRINE: No heat or cold intolerance; No hair loss  PSYCHIATRIC: No depression, anxiety, mood swings, or difficulty sleeping  HEME/LYMPH: No easy bruising, or bleeding gums      MEDICATIONS  (STANDING):  amLODIPine   Tablet 10 milliGRAM(s) Oral Once  cloNIDine 0.2 milliGRAM(s) Oral Once    MEDICATIONS  (PRN):      Allergies  penicillin (Other)    SOCIAL HISTORY:  Former smoker  Former ETOH, sober  Prior cocaine use       FAMILY HISTORY:  FH: hypertension    Family history of cardiac disorder (Mother)  mother      Vital Signs Last 24 Hrs  T(C): 36.7 (12 Apr 2024 15:23), Max: 36.7 (12 Apr 2024 14:08)  T(F): 98 (12 Apr 2024 15:23), Max: 98 (12 Apr 2024 14:08)  HR: 71 (12 Apr 2024 15:23) (71 - 87)  BP: 194/143 (12 Apr 2024 15:23) (194/143 - 244/101)  RR: 20 (12 Apr 2024 15:23) (20 - 20)  SpO2: 100% (12 Apr 2024 15:23) (98% - 100%)    Parameters below as of 12 Apr 2024 14:08  Patient On (Oxygen Delivery Method): room air        Physical Exam:  Constitutional: AAOx3, NAD  Neck: supple, No JVD  Cardiovascular: +S1S2 RRR, +ALLIE  Pulmonary: CTA b/l, unlabored  Abdomen: +BS, soft NTTP  Extremities: no edema b/l  Neuro: non focal, speech clear, CORDOVA x 4    LABS:  Pending      RADIOLOGY & ADDITIONAL STUDIES:  MUSTAPHA 12/2023  CONCLUSIONS:   1. Left ventricular systolic function is normal.   2. The left atrium is severely dilated.   3. A bioprosthetic valve replacement present in the aortic position. No prosthetic aortic stenosis. No intravalvular regurgitation No paravalvular regurgitation.   4. Bioprosthetic valve present. in the mitral position. Well seated prosthetic mitral valve with normal function with normal leaflet excursion. Transvalvular mitral gradients are normal. No prosthetic mitral stenosis. There is No intravalvular mitral regurgitation. Moderate paravalvular mitral (one jet) regurgitation, originating at 9 o'clock, directed centrally.   5. No pericardial effusion seen.   6. No echocardiographic evidenceof vegetations.   7. Compared to the transesophageal echocardiogram performed on 9/1/2023 Mitral valve vegetation is resolved.    TTE 9/2023  Summary:   1. Left ventricular ejection fraction, by visual estimation, is 50 to   55%.   2. Normal global left ventricular systolic function.   3. Diastolic function indeterminate.   4. Abnormal septal motion consistent with post-operative status.   5. Normal RV size with mildly reduced RV systolic function, inadequate   estimation of RVSP.   6. Bioprosthetic mitral valve present with a large mobile lesion (at   least 2.0 cm x 1.5 cm) prolapsing into and out of the LV-LA cavity,   hightly suggestive of vegetation. Mean transmitral valve gradient 4.8   mmHg (at HR of 60 bpm).   7. Bioprosthesis in the aortic position, mean gradient of 9 mmHg.   8. There is no evidence of pericardial effusion.   9. Compared to the prior TTE study from 5/23/23, lesion over bioMVR is   again noted and is now much larger and recommend MUSTAPHA study to confirm   vegetation and exclude other valvular involvement.    Juan Gomez DO Electronically signed on 9/26/2023 at 4:23:59 PM     [place] CARDIAC ELECTROPHYSIOLOGY  [facility] Faculty Practice   Office: [address] [address]  Telephone: [phone] Fax:[phone]      HPI: 62yo male with  PMH of aortic dissection s/p repair, CAD s/p CABG, aortic and mitral valve disease s/p AVR and MV repair, bowel ischemia s/p resection, ESRD previously on dialysis, HTN, AVMs s/p cauterization, and atrial fibrillation/flutter s/p PVI+CTI+mitral line in 9/2021, PVCs, recently spontaneous subdural/subarachnoid hemorrhage and mitral valve endocarditis.     He was to [facility] in 6/2023 with sudural/subarachnoid hemorrhage. Eliquis was stopped. Neurosurgery eventually cleared him to resume eliquis in 8/2023. He was referred for a LAAO/Watchman evaluation and agreed to undergo the procedure, however, he unfortunately developed mitral valve endocarditis and was hospitalized for it in 9/2023. He completed 6 weeks of antibiotic therapy, and a repeat MUSTAPHA was negative for any vegetations. AV fistula was thought to be responsible for the infection which has now been removed. He then presented to [facility] on 4/4/2024 for a BLAINE occlusion/Watchman implant and was noted to have SANTOS (Cr 5; baseline ~2) and anemia (Hg 7.1). He denies any bleeding issues or dark stools. Denies HA, fevers, chills, CP, palp, or SOB. He does report some recent balance issues, vertigo and episodes of N/V last night. He also reports chronic loose stools.     In ED, noted to be hypertensive with . Labs pending     TELE: SR w/ prolonged AV delay, PVCs  EKG: SR w/ prolonged AV delay     PAST MEDICAL & SURGICAL HISTORY:  CHF (congestive heart failure)  CVA (cerebral vascular accident)  Seizures  last seizure 10 years ago  HTN (hypertension)  Hyperlipemia  Atrial fibrillation  Former smoker  History of cocaine use  H/O aortic dissection  s/p repair (2010), surgery complicated by bowel ischemia s/p bowel resection and ostomy with reversal  H/O aortic valve insufficiency  Mitral regurgitation  GIB (gastrointestinal bleeding)  CAD (coronary artery disease)  s/p PCI 1998  and CABG x 2 11/2020  H/O colectomy  S/P AVR (aortic valve replacement)  (t)AVR 11/2020 Dr Butler  S/P MVR (mitral valve replacement)  (t)MVR 11/2020 Dr Butler  H/O aortic dissection  Type A; emergent repair 2010  AV fistula      REVIEW OF SYSTEMS:  CONSTITUTIONAL: No fever, weight loss, or fatigue  EYES: No visual disturbances  NECK: No pain or stiffness  RESPIRATORY: No cough, wheezing, chills or hemoptysis; No shortness of breath  CARDIOVASCULAR: see HPI  GASTROINTESTINAL: No abdominal or epigastric pain. No nausea, vomiting, or hematemesis; No diarrhea or constipation. No melena or hematochezia.  NEUROLOGICAL: No headaches, memory loss, loss of strength, numbness, or tremors  SKIN: No itching, burning, rashes, or lesions   LYMPH NODES: No enlarged glands  ENDOCRINE: No heat or cold intolerance; No hair loss  PSYCHIATRIC: No depression, anxiety, mood swings, or difficulty sleeping  HEME/LYMPH: No easy bruising, or bleeding gums      MEDICATIONS  (STANDING):  amLODIPine   Tablet 10 milliGRAM(s) Oral Once  cloNIDine 0.2 milliGRAM(s) Oral Once    MEDICATIONS  (PRN):      Allergies  penicillin (Other)    SOCIAL HISTORY:  Former smoker  Former ETOH, sober  Prior cocaine use       FAMILY HISTORY:  FH: hypertension    Family history of cardiac disorder (Mother)  mother      Vital Signs Last 24 Hrs  T(C): 36.7 (12 Apr 2024 15:23), Max: 36.7 (12 Apr 2024 14:08)  T(F): 98 (12 Apr 2024 15:23), Max: 98 (12 Apr 2024 14:08)  HR: 71 (12 Apr 2024 15:23) (71 - 87)  BP: 194/143 (12 Apr 2024 15:23) (194/143 - 244/101)  RR: 20 (12 Apr 2024 15:23) (20 - 20)  SpO2: 100% (12 Apr 2024 15:23) (98% - 100%)    Parameters below as of 12 Apr 2024 14:08  Patient On (Oxygen Delivery Method): room air        Physical Exam:  Constitutional: AAOx3, NAD  Neck: supple, No JVD  Cardiovascular: +S1S2 RRR, +ALLIE  Pulmonary: CTA b/l, unlabored  Abdomen: +BS, soft NTTP  Extremities: no edema b/l  Neuro: non focal, speech clear, CORDOVA x 4    LABS:  Pending      RADIOLOGY & ADDITIONAL STUDIES:  MUSTAPHA 12/2023  CONCLUSIONS:   1. Left ventricular systolic function is normal.   2. The left atrium is severely dilated.   3. A bioprosthetic valve replacement present in the aortic position. No prosthetic aortic stenosis. No intravalvular regurgitation No paravalvular regurgitation.   4. Bioprosthetic valve present. in the mitral position. Well seated prosthetic mitral valve with normal function with normal leaflet excursion. Transvalvular mitral gradients are normal. No prosthetic mitral stenosis. There is No intravalvular mitral regurgitation. Moderate paravalvular mitral (one jet) regurgitation, originating at 9 o'clock, directed centrally.   5. No pericardial effusion seen.   6. No echocardiographic evidenceof vegetations.   7. Compared to the transesophageal echocardiogram performed on 9/1/2023 Mitral valve vegetation is resolved.    TTE 9/2023  Summary:   1. Left ventricular ejection fraction, by visual estimation, is 50 to   55%.   2. Normal global left ventricular systolic function.   3. Diastolic function indeterminate.   4. Abnormal septal motion consistent with post-operative status.   5. Normal RV size with mildly reduced RV systolic function, inadequate   estimation of RVSP.   6. Bioprosthetic mitral valve present with a large mobile lesion (at   least 2.0 cm x 1.5 cm) prolapsing into and out of the LV-LA cavity,   hightly suggestive of vegetation. Mean transmitral valve gradient 4.8   mmHg (at HR of 60 bpm).   7. Bioprosthesis in the aortic position, mean gradient of 9 mmHg.   8. There is no evidence of pericardial effusion.   9. Compared to the prior TTE study from 5/23/23, lesion over bioMVR is   again noted and is now much larger and recommend MUSTAPHA study to confirm   vegetation and exclude other valvular involvement.    Juan Gomez DO Electronically signed on 9/26/2023 at 4:23:59 PM     [place] CARDIAC ELECTROPHYSIOLOGY  [facility] Faculty Practice   Office: [address] [address]  Telephone: [phone] Fax:[phone]      HPI: 62yo male with  PMH of aortic dissection s/p repair, CAD s/p CABG, aortic and mitral valve disease s/p AVR and MV repair, bowel ischemia s/p resection, ESRD previously on dialysis, HTN, AVMs s/p cauterization, and atrial fibrillation/flutter s/p PVI+CTI+mitral line in 9/2021, PVCs, recently spontaneous subdural/subarachnoid hemorrhage and mitral valve endocarditis.     He was to [facility] in 6/2023 with sudural/subarachnoid hemorrhage. Eliquis was stopped. Neurosurgery eventually cleared him to resume eliquis in 8/2023. He was referred for a LAAO/Watchman evaluation and agreed to undergo the procedure, however, he unfortunately developed mitral valve endocarditis and was hospitalized for it in 9/2023. He completed 6 weeks of antibiotic therapy, and a repeat MUSTAPHA was negative for any vegetations. AV fistula was thought to be responsible for the infection which has now been removed. He then presented to [facility] on 4/4/2024 for a BLAINE occlusion/Watchman implant and was noted to have SANTOS (Cr 5; baseline ~2) and anemia (Hg 7.1). He denies any bleeding issues or dark stools. Denies HA, fevers, chills, CP, palp, or SOB. He does report some recent balance issues, vertigo and episodes of N/V last night. He also reports chronic loose stools.     In ED, noted to be hypertensive with . Labs pending     TELE: SR w/ prolonged AV delay, PVCs  EKG: SR w/ prolonged AV delay     PAST MEDICAL & SURGICAL HISTORY:  CHF (congestive heart failure)  CVA (cerebral vascular accident)  Seizures  last seizure 10 years ago  HTN (hypertension)  Hyperlipemia  Atrial fibrillation  Former smoker  History of cocaine use  H/O aortic dissection  s/p repair (2010), surgery complicated by bowel ischemia s/p bowel resection and ostomy with reversal  H/O aortic valve insufficiency  Mitral regurgitation  GIB (gastrointestinal bleeding)  CAD (coronary artery disease)  s/p PCI 1998  and CABG x 2 11/2020  H/O colectomy  S/P AVR (aortic valve replacement)  (t)AVR 11/2020 Dr Butler  S/P MVR (mitral valve replacement)  (t)MVR 11/2020 Dr Butler  H/O aortic dissection  Type A; emergent repair 2010  AV fistula      REVIEW OF SYSTEMS:  CONSTITUTIONAL: No fever, weight loss, or fatigue  EYES: No visual disturbances  NECK: No pain or stiffness  RESPIRATORY: No cough, wheezing, chills or hemoptysis; No shortness of breath  CARDIOVASCULAR: see HPI  GASTROINTESTINAL: No abdominal or epigastric pain. + nausea, vomiting. + chronic loose stools No constipation,  melena or hematochezia.  NEUROLOGICAL: No headaches, memory loss, loss of strength, numbness, or tremors  SKIN: No itching, burning, rashes, or lesions   LYMPH NODES: No enlarged glands  ENDOCRINE: No heat or cold intolerance; No hair loss  PSYCHIATRIC: No depression, anxiety, mood swings, or difficulty sleeping  HEME/LYMPH: No easy bruising, or bleeding gums      MEDICATIONS  (STANDING):  amLODIPine   Tablet 10 milliGRAM(s) Oral Once  cloNIDine 0.2 milliGRAM(s) Oral Once    MEDICATIONS  (PRN):      Allergies  penicillin (Other)    SOCIAL HISTORY:  Former smoker  Former ETOH, sober  Prior cocaine use       FAMILY HISTORY:  FH: hypertension    Family history of cardiac disorder (Mother)  mother      Vital Signs Last 24 Hrs  T(C): 36.7 (12 Apr 2024 15:23), Max: 36.7 (12 Apr 2024 14:08)  T(F): 98 (12 Apr 2024 15:23), Max: 98 (12 Apr 2024 14:08)  HR: 71 (12 Apr 2024 15:23) (71 - 87)  BP: 194/143 (12 Apr 2024 15:23) (194/143 - 244/101)  RR: 20 (12 Apr 2024 15:23) (20 - 20)  SpO2: 100% (12 Apr 2024 15:23) (98% - 100%)    Parameters below as of 12 Apr 2024 14:08  Patient On (Oxygen Delivery Method): room air        Physical Exam:  Constitutional: AAOx3, NAD  Neck: supple, No JVD  Cardiovascular: +S1S2 RRR, +ALLIE  Pulmonary: CTA b/l, unlabored  Abdomen: +BS, soft NTTP  Extremities: no edema b/l  Neuro: non focal, speech clear, CORDOVA x 4    LABS:  Pending      RADIOLOGY & ADDITIONAL STUDIES:  MUSTAPHA 12/2023  CONCLUSIONS:   1. Left ventricular systolic function is normal.   2. The left atrium is severely dilated.   3. A bioprosthetic valve replacement present in the aortic position. No prosthetic aortic stenosis. No intravalvular regurgitation No paravalvular regurgitation.   4. Bioprosthetic valve present. in the mitral position. Well seated prosthetic mitral valve with normal function with normal leaflet excursion. Transvalvular mitral gradients are normal. No prosthetic mitral stenosis. There is No intravalvular mitral regurgitation. Moderate paravalvular mitral (one jet) regurgitation, originating at 9 o'clock, directed centrally.   5. No pericardial effusion seen.   6. No echocardiographic evidenceof vegetations.   7. Compared to the transesophageal echocardiogram performed on 9/1/2023 Mitral valve vegetation is resolved.    TTE 9/2023  Summary:   1. Left ventricular ejection fraction, by visual estimation, is 50 to   55%.   2. Normal global left ventricular systolic function.   3. Diastolic function indeterminate.   4. Abnormal septal motion consistent with post-operative status.   5. Normal RV size with mildly reduced RV systolic function, inadequate   estimation of RVSP.   6. Bioprosthetic mitral valve present with a large mobile lesion (at   least 2.0 cm x 1.5 cm) prolapsing into and out of the LV-LA cavity,   hightly suggestive of vegetation. Mean transmitral valve gradient 4.8   mmHg (at HR of 60 bpm).   7. Bioprosthesis in the aortic position, mean gradient of 9 mmHg.   8. There is no evidence of pericardial effusion.   9. Compared to the prior TTE study from 5/23/23, lesion over bioMVR is   again noted and is now much larger and recommend MUSTAPHA study to confirm   vegetation and exclude other valvular involvement.    Juan Gomez DO Electronically signed on 9/26/2023 at 4:23:59 PM

## 2024-04-12 NOTE — ED ADULT TRIAGE NOTE - CHIEF COMPLAINT QUOTE
Pt arrives to ED send by cardiologist office for " abnormal blood work .. hemoglobin , kidney function ..  " on Wednesday - pt was getting ready for watchman procedure. Pt also endorsing vomiting

## 2024-04-12 NOTE — H&P ADULT - NSHPPHYSICALEXAM_GEN_ALL_CORE
T(C): 36.8 (04-12-24 @ 17:35), Max: 36.8 (04-12-24 @ 17:35)  HR: 73 (04-12-24 @ 17:35) (71 - 87)  BP: 179/76 (04-12-24 @ 17:35) (179/76 - 244/101)  RR: 16 (04-12-24 @ 17:35) (16 - 20)  SpO2: 99% (04-12-24 @ 17:35) (98% - 100%)    GEN - NAD  HEENT - NCAT, EOMI, SHANEL,  RESP - CTA BL, no wheeze/stridor/rhonchi/crackles. not on supplemental O2.  CARDIO - NS1S2,  No murmurs  ABD - Soft/Non tender/Non distended. Normal BS x4 quadrants.   Ext - No EMIL.   MSK - full ROM of BL upper and lower extremities without pain or restriction. BL 5/5 strength on upper and lower extremities.   Neuro - cn 2-12 grossly intact. no tremor. gait not observed.   Psych- AAOx3.  attentive. normal affect. Medical Necessity Clause: This procedure was medically necessary because the lesions that were treated were:

## 2024-04-12 NOTE — CONSULT NOTE ADULT - ASSESSMENT
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

## 2024-04-12 NOTE — H&P ADULT - HISTORY OF PRESENT ILLNESS
62-year-old male with h/o HTN, aortic dissection s/p repair, CAD and VHD with prior CABG/bio-AVR/bio-MVR, bowel ischemia treated with resection, ESRD on dialysis (pt no longer on dialysis), AVMs s/p cauterization, atrial fibrillation/flutter with previous RFA, PVCs, sudural/subarachnoid hemorrhage, restarted Eliquis 8/2023, MUSTAPHA performed 9/27/23 with +MV vegetation and source thought to be AV graft which was removed 10/1/23, treated with 6 week A/B course, repeat MUSTAPHA in December 2023 negative for any vegetations. AV fistula thought to be responsible for the infection has sicce been removed. He is now presenting for MUSTAPHA/LAAO. 62-year-old male with h/o HTN, aortic dissection s/p repair, CAD and VHD with prior CABG/bio-AVR/bio-MVR, bowel ischemia treated with resection, ESRD on dialysis (pt no longer on dialysis), AVMs s/p cauterization, atrial fibrillation/flutter with previous RFA, PVCs, sudural/subarachnoid hemorrhage, restarted Eliquis 8/2023, MUSTAPHA performed 9/27/23 with +MV vegetation and source thought to be AV graft which was removed 10/1/23, treated with 6 week A/B course, repeat MUSTAPHA in December 2023 negative for any vegetations. AV fistula thought to be responsible for the infection which has been removed.  He was schedule for BLAINE occlusion/Watchman implant today nd was noted to have SANTOS (Cr 5; baseline ~2) and anemia (Hg 7.1). He denies any bleeding issues or dark stools. Denies HA, fevers, chills, CP, palp, or SOB. He does report some recent balance issues, vertigo and episodes of N/V last night. He also reports chronic loose stools. No blood seen in stool/emesis. Pt is voiding urine w/o any issues. Pt states last emesis last night after he drunk plenty of Gatorade. Pt also drunk 2 beers. No vomiting since came to ed,ambulate to bathroom w/o any dizziness. Pt states he has on/off dizziness last one month.Last dose of eliquis last night. seen by  last week. Pt is found to have sbp 244 at ed.       PAST MEDICAL & SURGICAL HISTORY:  CHF (congestive heart failure)  CVA (cerebral vascular accident)  Seizures  last seizure 10 years ago  HTN (hypertension)  Hyperlipemia  Atrial fibrillation  Former smoker  History of cocaine use  H/O aortic dissection  s/p repair (2010), surgery complicated by bowel ischemia s/p bowel resection and ostomy with reversal  H/O aortic valve insufficiency  Mitral regurgitation  GIB (gastrointestinal bleeding)  CAD (coronary artery disease)  s/p PCI 1998  and CABG x 2 11/2020  H/O colectomy  S/P AVR (aortic valve replacement)  (t)AVR 11/2020 Dr Butler  S/P MVR (mitral valve replacement)  (t)MVR 11/2020 Dr Butler  H/O aortic dissection  Type A; emergent repair 2010  AV fistula    SOCIAL HISTORY:  Former smoker  Former ETOH, pt states he was drinking heavily in past currently drink 1-2 beers occasionally   Prior cocaine use -2017 last use      FAMILY HISTORY:  FH: hypertension    Family history of cardiac disorder (Mother)  frank     62-year-old male with h/o HTN, aortic dissection s/p repair, CAD and VHD with prior CABG/bio-AVR/bio-MVR, bowel ischemia treated with resection, ESRD on dialysis (pt no longer on dialysis), AVMs s/p cauterization, atrial fibrillation/flutter with previous RFA, PVCs, sudural/subarachnoid hemorrhage, restarted Eliquis 8/2023, MUSTAPHA performed 9/27/23 with +MV vegetation and source thought to be AV graft which was removed 10/1/23, treated with 6 week A/B course, repeat MUSTAPHA in December 2023 negative for any vegetations. AV fistula thought to be responsible for the infection which has been removed.  He was schedule for BLAINE occlusion/Watchman implant today nd was noted to have SANTOS (Cr 5; baseline ~2) and anemia (Hg 7.1). He denies any bleeding issues or dark stools. Denies HA, fevers, chills, CP, palp, or SOB. He does report some recent balance issues, vertigo and episodes of N/V last night. He also reports chronic loose stools. No blood seen in stool/emesis. Pt is voiding urine w/o any issues. Pt states last emesis last night after he drunk plenty of Gatorade. Pt also drunk 2 beers. No vomiting since came to ed,ambulate to bathroom w/o any dizziness. Pt states he has on/off dizziness last one month.Last dose of eliquis last night. seen by  last week. Pt is found to have sbp 244 at ed.       PAST MEDICAL & SURGICAL HISTORY:  CHF (congestive heart failure)  CVA (cerebral vascular accident)  Seizures  last seizure 10 years ago  HTN (hypertension)  Hyperlipemia  Atrial fibrillation  Former smoker  History of cocaine use  H/O aortic dissection  s/p repair (2010), surgery complicated by bowel ischemia s/p bowel resection and ostomy with reversal  H/O aortic valve insufficiency  Mitral regurgitation  GIB (gastrointestinal bleeding)  CAD (coronary artery disease)  s/p PCI 1998  and CABG x 2 11/2020  H/O colectomy  S/P AVR (aortic valve replacement)  (t)AVR 11/2020 Dr Butler  S/P MVR (mitral valve replacement)  (t)MVR 11/2020 Dr Butler  H/O aortic dissection  Type A; emergent repair 2010  AV fistula    SOCIAL HISTORY:  Former smoker  Former ETOH, pt states he was drinking heavily in past currently drink 1-2 beers occasionally   Prior cocaine use -2017 last use      FAMILY HISTORY:  FH: hypertension    Family history of cardiac disorder (Mother)

## 2024-04-12 NOTE — ED PROVIDER NOTE - PHYSICAL EXAMINATION
General: Awake, alert, lying in bed in NAD  HEENT: Normocephalic, atraumatic. No scleral icterus or conjunctival injection. EOMI. Moist mucous membranes. Oropharynx clear.   Neck:. Soft and supple.  Cardiac: RRR, Peripheral pulses 2+ and symmetric. No LE edema.  Resp: Lungs CTAB. No accessory muscle use  Abd: Soft, non-tender, non-distended. No guarding, rebound, or rigidity.  Back: Spine midline and non-tender.   Skin: Surgical scars. No rashes, abrasions, or lacerations.  Neuro: AO x 4. Moves all extremities symmetrically. Motor strength and sensation grossly intact.  Psych: Appropriate mood and affect

## 2024-04-12 NOTE — CONSULT NOTE ADULT - SUBJECTIVE AND OBJECTIVE BOX
Patient is a 63y old  Male who presents with a chief complaint of anemia,acute renal failure (12 Apr 2024 17:55)      HPI:  62-year-old male with h/o HTN, aortic dissection s/p repair, CAD and VHD with prior CABG/bio-AVR/bio-MVR, bowel ischemia treated with resection, ESRD on dialysis (pt no longer on dialysis), AVMs s/p cauterization, atrial fibrillation/flutter with previous RFA, PVCs, sudural/subarachnoid hemorrhage, restarted Eliquis 8/2023, MUSTAPHA performed 9/27/23 with +MV vegetation and source thought to be AV graft which was removed 10/1/23, treated with 6 week A/B course, repeat MUSTAPHA in December 2023 negative for any vegetations. AV fistula thought to be responsible for the infection which has been removed.  He was schedule for BLAINE occlusion/Watchman implant today nd was noted to have SANTOS (Cr 5; baseline ~2) and anemia (Hg 7.1). He denies any bleeding issues or dark stools. Denies HA, fevers, chills, CP, palp, or SOB. He does report some recent balance issues, vertigo and episodes of N/V last night. He also reports chronic loose stools. No blood seen in stool/emesis. Pt is voiding urine w/o any issues. Pt states last emesis last night after he drunk plenty of Gatorade. Pt also drunk 2 beers. No vomiting since came to ed,ambulate to bathroom w/o any dizziness. Pt states he has on/off dizziness last one month.Last dose of eliquis last night. seen by  last week. Pt is found to have sbp 244 at ed.       PAST MEDICAL & SURGICAL HISTORY:  CHF (congestive heart failure)  CVA (cerebral vascular accident)  Seizures  last seizure 10 years ago  HTN (hypertension)  Hyperlipemia  Atrial fibrillation  Former smoker  History of cocaine use  H/O aortic dissection  s/p repair (2010), surgery complicated by bowel ischemia s/p bowel resection and ostomy with reversal  H/O aortic valve insufficiency  Mitral regurgitation  GIB (gastrointestinal bleeding)  CAD (coronary artery disease)  s/p PCI 1998  and CABG x 2 11/2020  H/O colectomy  S/P AVR (aortic valve replacement)  (t)AVR 11/2020 Dr Butler  S/P MVR (mitral valve replacement)  (t)MVR 11/2020 Dr Butler  H/O aortic dissection  Type A; emergent repair 2010  AV fistula    SOCIAL HISTORY:  Former smoker  Former ETOH, pt states he was drinking heavily in past currently drink 1-2 beers occasionally   Prior cocaine use -2017 last use      FAMILY HISTORY:  FH: hypertension    Family history of cardiac disorder (Mother)       (12 Apr 2024 17:55)      PAST MEDICAL & SURGICAL HISTORY:  CHF (congestive heart failure)      CVA (cerebral vascular accident)      Seizures  last seizure 10 years ago      HTN (hypertension)      Hyperlipemia      COVID-19 october 2020      Atrial fibrillation      Former smoker      History of cocaine use  remote hx, currently sober      H/O aortic dissection  s/p repair (2010), surgery complicated by bowel ischemia s/p bowel resection and ostomy with reversal      H/O aortic valve insufficiency      Mitral regurgitation      GIB (gastrointestinal bleeding)      CAD (coronary artery disease)  s/p PCI 1998  and CABG x 2 11/2020      Chronic atrial fibrillation      H/O colectomy      Status post double vessel coronary artery bypass  11/2020 Dr Butler      S/P AVR (aortic valve replacement)  (t)AVR 11/2020 Dr Butler      S/P MVR (mitral valve replacement)  (t)MVR 11/2020 Dr Butler      H/O aortic dissection  Type A; emergent repair 2010      AV fistula  LUE          FAMILY HISTORY:  FH: hypertension    Family history of cardiac disorder (Mother)  mother        Social History:    MEDICATIONS  (STANDING):  amLODIPine   Tablet 10 milliGRAM(s) Oral daily  atorvastatin 40 milliGRAM(s) Oral at bedtime  carvedilol 6.25 milliGRAM(s) Oral every 12 hours  cloNIDine 0.1 milliGRAM(s) Oral two times a day  isosorbide   mononitrate ER Tablet (IMDUR) 30 milliGRAM(s) Oral daily  levETIRAcetam 500 milliGRAM(s) Oral two times a day  pantoprazole  Injectable 40 milliGRAM(s) IV Push every 12 hours  tamsulosin 0.4 milliGRAM(s) Oral at bedtime    MEDICATIONS  (PRN):  acetaminophen     Tablet .. 650 milliGRAM(s) Oral every 6 hours PRN Temp greater or equal to 38C (100.4F), Mild Pain (1 - 3)  aluminum hydroxide/magnesium hydroxide/simethicone Suspension 30 milliLiter(s) Oral every 4 hours PRN Dyspepsia  hydrALAZINE Injectable 10 milliGRAM(s) IV Push every 4 hours PRN sbp>159  labetalol Injectable 10 milliGRAM(s) IV Push every 6 hours PRN Systolic blood pressure >160  melatonin 3 milliGRAM(s) Oral at bedtime PRN Insomnia  ondansetron Injectable 4 milliGRAM(s) IV Push every 8 hours PRN Nausea and/or Vomiting      Allergies    penicillin (Other)    Intolerances            Vital Signs Last 24 Hrs  T(C): 36.8 (12 Apr 2024 17:35), Max: 36.8 (12 Apr 2024 17:35)  T(F): 98.3 (12 Apr 2024 17:35), Max: 98.3 (12 Apr 2024 17:35)  HR: 73 (12 Apr 2024 17:35) (71 - 87)  BP: 179/76 (12 Apr 2024 17:35) (179/76 - 244/101)  BP(mean): --  RR: 16 (12 Apr 2024 17:35) (16 - 20)  SpO2: 99% (12 Apr 2024 17:35) (98% - 100%)    Parameters below as of 12 Apr 2024 17:35  Patient On (Oxygen Delivery Method): room air      Daily Height in cm: 172.72 (12 Apr 2024 14:08)    Daily   I&O's Detail    I&O's Summary      PHYSICAL EXAM:    GENERAL: NAD, well-groomed, well-developed  HEAD:  Atraumatic, Normocephalic  EYES: EOMI, PERRLA, conjunctiva and sclera clear  ENMT: No tonsillar erythema, exudates, or enlargement; Moist mucous membranes, Good dentition, No lesions  NECK: Supple, No JVD, Normal thyroid  NERVOUS SYSTEM:  Alert & Oriented X3, Good concentration; Motor Strength 5/5 B/L upper and lower extremities; DTRs 2+ intact and symmetric  CHEST/LUNG: Clear to percussion bilaterally; No rales, rhonchi, wheezing, or rubs  HEART: Regular rate and rhythm; No murmurs, rubs, or gallops  ABDOMEN: Soft, Nontender, Nondistended; Bowel sounds present  EXTREMITIES:  2+ Peripheral Pulses, No clubbing, cyanosis, or edema  LYMPH: No lymphadenopathy noted  SKIN: No rashes or lesions      LABS:                        7.9    6.44  )-----------( 152      ( 12 Apr 2024 15:45 )             24.1     04-12    142  |  101  |  55.8<H>  ----------------------------<  96  3.0<L>   |  22.0  |  5.12<H>    Ca    8.6      12 Apr 2024 15:45  Mg     1.7     04-12    TPro  7.0  /  Alb  3.8  /  TBili  0.9  /  DBili  x   /  AST  11  /  ALT  13  /  AlkPhos  74  04-12    PT/INR - ( 12 Apr 2024 15:45 )   PT: 14.6 sec;   INR: 1.33 ratio         PTT - ( 12 Apr 2024 15:45 )  PTT:33.1 sec  Urinalysis Basic - ( 12 Apr 2024 15:45 )    Color: x / Appearance: x / SG: x / pH: x  Gluc: 96 mg/dL / Ketone: x  / Bili: x / Urobili: x   Blood: x / Protein: x / Nitrite: x   Leuk Esterase: x / RBC: x / WBC x   Sq Epi: x / Non Sq Epi: x / Bacteria: x      Magnesium: 1.7 mg/dL (04-12 @ 15:45)          RADIOLOGY & ADDITIONAL TESTS:   Patient is a 63y old  Male who presents with a chief complaint of anemia,acute renal failure (12 Apr 2024 17:55)      HPI:  62-year-old male with h/o HTN, aortic dissection s/p repair, CAD and VHD with prior CABG/bio-AVR/bio-MVR, bowel ischemia treated with resection, ESRD on dialysis (pt no longer on dialysis), AVMs s/p cauterization, atrial fibrillation/flutter with previous RFA, PVCs, sudural/subarachnoid hemorrhage, restarted Eliquis 8/2023, MUSTAPHA performed 9/27/23 with +MV vegetation and source thought to be AV graft which was removed 10/1/23, treated with 6 week A/B course, repeat MUSTAPHA in December 2023 negative for any vegetations. AV fistula thought to be responsible for the infection which has been removed.  He was schedule for BLAINE occlusion/Watchman implant today nd was noted to have SANTOS (Cr 5; baseline ~2) and anemia (Hg 7.1). He denies any bleeding issues or dark stools. Denies HA, fevers, chills, CP, palp, or SOB. He does report some recent balance issues, vertigo and episodes of N/V last night. He also reports chronic loose stools. No blood seen in stool/emesis. Pt is voiding urine w/o any issues. Pt states last emesis last night after he drunk plenty of Gatorade. Pt also drunk 2 beers. No vomiting since came to ed,ambulate to bathroom w/o any dizziness. Pt states he has on/off dizziness last one month.Last dose of eliquis last night. seen by  last week. Pt is found to have sbp 244 at ed.    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---> Needs follow up          PAST MEDICAL & SURGICAL HISTORY:  CHF (congestive heart failure)  CVA (cerebral vascular accident)  Seizures  last seizure 10 years ago  HTN (hypertension)  Hyperlipemia  Atrial fibrillation  Former smoker  History of cocaine use  H/O aortic dissection  s/p repair (2010), surgery complicated by bowel ischemia s/p bowel resection and ostomy with reversal  H/O aortic valve insufficiency  Mitral regurgitation  GIB (gastrointestinal bleeding)  CAD (coronary artery disease)  s/p PCI 1998  and CABG x 2 11/2020  H/O colectomy  S/P AVR (aortic valve replacement)  (t)AVR 11/2020 Dr Butler  S/P MVR (mitral valve replacement)  (t)MVR 11/2020 Dr Butler  H/O aortic dissection  Type A; emergent repair 2010  AV fistula    SOCIAL HISTORY:  Former smoker  Former ETOH, pt states he was drinking heavily in past currently drink 1-2 beers occasionally   Prior cocaine use -2017 last use      FAMILY HISTORY:  FH: hypertension    Family history of cardiac disorder (Mother)       (12 Apr 2024 17:55)      PAST MEDICAL & SURGICAL HISTORY:  CHF (congestive heart failure)      CVA (cerebral vascular accident)      Seizures  last seizure 10 years ago      HTN (hypertension)      Hyperlipemia      COVID-19 october 2020      Atrial fibrillation      Former smoker      History of cocaine use  remote hx, currently sober      H/O aortic dissection  s/p repair (2010), surgery complicated by bowel ischemia s/p bowel resection and ostomy with reversal      H/O aortic valve insufficiency      Mitral regurgitation      GIB (gastrointestinal bleeding)      CAD (coronary artery disease)  s/p PCI 1998  and CABG x 2 11/2020      Chronic atrial fibrillation      H/O colectomy      Status post double vessel coronary artery bypass  11/2020 Dr Butler      S/P AVR (aortic valve replacement)  (t)AVR 11/2020 Dr Butler      S/P MVR (mitral valve replacement)  (t)MVR 11/2020 Dr Butler      H/O aortic dissection  Type A; emergent repair 2010      AV fistula  LUE          FAMILY HISTORY:  FH: hypertension    Family history of cardiac disorder (Mother)  mother        Social History:    MEDICATIONS  (STANDING):  amLODIPine   Tablet 10 milliGRAM(s) Oral daily  atorvastatin 40 milliGRAM(s) Oral at bedtime  carvedilol 6.25 milliGRAM(s) Oral every 12 hours  cloNIDine 0.1 milliGRAM(s) Oral two times a day  isosorbide   mononitrate ER Tablet (IMDUR) 30 milliGRAM(s) Oral daily  levETIRAcetam 500 milliGRAM(s) Oral two times a day  pantoprazole  Injectable 40 milliGRAM(s) IV Push every 12 hours  tamsulosin 0.4 milliGRAM(s) Oral at bedtime    MEDICATIONS  (PRN):  acetaminophen     Tablet .. 650 milliGRAM(s) Oral every 6 hours PRN Temp greater or equal to 38C (100.4F), Mild Pain (1 - 3)  aluminum hydroxide/magnesium hydroxide/simethicone Suspension 30 milliLiter(s) Oral every 4 hours PRN Dyspepsia  hydrALAZINE Injectable 10 milliGRAM(s) IV Push every 4 hours PRN sbp>159  labetalol Injectable 10 milliGRAM(s) IV Push every 6 hours PRN Systolic blood pressure >160  melatonin 3 milliGRAM(s) Oral at bedtime PRN Insomnia  ondansetron Injectable 4 milliGRAM(s) IV Push every 8 hours PRN Nausea and/or Vomiting    Home Medications:   * Patient Currently Takes Medications as of 12-Apr-2024 18:49 documented in Structured Notes  · 	amLODIPine 10 mg oral tablet: Last Dose Taken:  , 1 tab(s) orally once a day  · 	apixaban 5 mg oral tablet: Last Dose Taken:  , 1 tab(s) orally every 12 hours  · 	carvedilol 6.25 mg oral tablet: Last Dose Taken:  , 1 tab(s) orally every 12 hours  · 	levETIRAcetam 1000 mg oral tablet: Last Dose Taken:  , 1 tab(s) orally 2 times a day  · 	tamsulosin 0.4 mg oral capsule: Last Dose Taken:  , 1 cap(s) orally once a day (at bedtime)  · 	[place] 2.5 mg-0.025 mg oral tablet: Last Dose Taken:  , 2 tab(s) orally 4 times a day as needed  · 	isosorbide mononitrate 30 mg oral tablet, extended release: Last Dose Taken:  , 1 tab(s) orally once a day  · 	cloNIDine 0.1 mg oral tablet: Last Dose Taken:  , 1 tab(s) orally 2 times a day  · 	torsemide 20 mg oral tablet: Last Dose Taken:  , 1 tab(s) orally once a day  · 	atorvastatin 40 mg oral tablet: Last Dose Taken:  , 1 tab(s) orally once a day (at bedtime)      Allergies    penicillin (Other)    Intolerances            Vital Signs Last 24 Hrs  T(C): 36.8 (12 Apr 2024 17:35), Max: 36.8 (12 Apr 2024 17:35)  T(F): 98.3 (12 Apr 2024 17:35), Max: 98.3 (12 Apr 2024 17:35)  HR: 73 (12 Apr 2024 17:35) (71 - 87)  BP: 179/76 (12 Apr 2024 17:35) (179/76 - 244/101)  BP(mean): --  RR: 16 (12 Apr 2024 17:35) (16 - 20)  SpO2: 99% (12 Apr 2024 17:35) (98% - 100%)    Parameters below as of 12 Apr 2024 17:35  Patient On (Oxygen Delivery Method): room air      Daily Height in cm: 172.72 (12 Apr 2024 14:08)    Daily   I&O's Detail    I&O's Summary      PHYSICAL EXAM:    GENERAL: NAD, feels well   HEAD:  Atraumatic, Normocephalic  NECK: Supple, No JVD,  NERVOUS SYSTEM:  Alert & Oriented X3, No asterixis   CHEST/LUNG: Clear / EAE   HEART: Regular rate and rhythm; No rub   ABDOMEN: Soft, Nontender, Nondistended  EXTREMITIES:  No edema         LABS:                        7.9    6.44  )-----------( 152      ( 12 Apr 2024 15:45 )             24.1     04-12    142  |  101  |  55.8<H>  ----------------------------<  96  3.0<L>   |  22.0  |  5.12<H>    Ca    8.6      12 Apr 2024 15:45  Mg     1.7     04-12    TPro  7.0  /  Alb  3.8  /  TBili  0.9  /  DBili  x   /  AST  11  /  ALT  13  /  AlkPhos  74  04-12    PT/INR - ( 12 Apr 2024 15:45 )   PT: 14.6 sec;   INR: 1.33 ratio         PTT - ( 12 Apr 2024 15:45 )  PTT:33.1 sec  Urinalysis Basic - ( 12 Apr 2024 15:45 )    Color: x / Appearance: x / SG: x / pH: x  Gluc: 96 mg/dL / Ketone: x  / Bili: x / Urobili: x   Blood: x / Protein: x / Nitrite: x   Leuk Esterase: x / RBC: x / WBC x   Sq Epi: x / Non Sq Epi: x / Bacteria: x      Magnesium: 1.7 mg/dL (04-12 @ 15:45)      < from: CT Head No Cont (04.12.24 @ 16:57) >    ACC: [id number] EXAM:  CT BRAIN   ORDERED BY: ABEL LOPEZ     PROCEDURE DATE:  04/12/2024          INTERPRETATION:  CLINICAL INFORMATION: Altered mental status.    COMPARISON: CT head of 9/20/2023.    PROCEDURE:  Noncontrast CT of the Head was performed from the skull base to the   vertex. Coronal and Sagittal reformats were obtained.    FINDINGS:    Chronic right frontoparietal temporal infarct with ex vacuo dilatation of   the adjacent lateral ventricle. No acute intracranial hemorrhage, mass  effect, midline shift, extra-axial collection, hydrocephalus, or evidence   of acute vascular territorial infarction. There is no acute intracranial   hemorrhage, mass effect, midline shift, extra-axial collection,   hydrocephalus, or evidence of acute vascular territorial infarction.    The visualized paranasal sinuses and mastoid air cells are clear.   Intraorbital contents are unremarkable. Calvarium is intact.    IMPRESSION:    No acute intracranial hemorrhage, mass effect, or evidence of acute   vascular territorial infarction. If clinical symptoms persist or worsen,   more sensitive evaluation with brain MRI may be obtained, if no   contraindications exist.    Stable examination since 9/20/2023.    --- End of Report ---    < end of copied text >      < from: CT Angio Abdomen w/ IV Cont (10.04.23 @ 12:04) >    ACC: [id number] EXAM:  CT ANGIO ABDOMEN ONLY (W)AW IC   ORDERED BY: ALIZE ALMANZAR     PROCEDURE DATE:  10/04/2023          INTERPRETATION:  CLINICAL INFORMATION: Right renal upper pole lesion.    COMPARISON: Contrast-enhanced CT of the abdomen and pelvis October 3,   2023, May 22, 2023, June 23, 2011CT angiogram December 13, 2022, June 25, 2021, pre and postcontrast CT of the abdomen and pelvis March 12, 2021.    CONTRAST/COMPLICATIONS:  IV Contrast: Omnipaque 350  94 cc administered   6 cc discarded  Oral Contrast: NONE  Complications: None reported at time of study completion    PROCEDURE:  CT of the Abdomen and Pelvis was performed.  Precontrast, Arterial and Delayed phases were acquired.  Sagittal and coronal reformats were performed.    FINDINGS:  LOWER CHEST: Again are noted peribronchovascular nodular and confluent   opacities at the lung bases. Mitral valve replacement.    LIVER: Within normal limits.  BILE DUCTS: Normal caliber.  GALLBLADDER: Layering gallstone. Vicarious excretion of contrast.  SPLEEN: Within normal limits.  PANCREAS: Within normal limits. Incidental pancreas ductus divisum.  ADRENALS: Within normal limits.  KIDNEYS/URETERS: Again is noted a heterogeneously hyperenhancing 1.9 x   1.4 cm lesion withwashout in the posterior upper pole of the right   kidney at site of the previous thinly septated cystic lesion in June 2011. There are bilateral simple renal cysts. Again is noted a   wedge-shaped region of delayed enhancement in the lateral midpole of the   left kidney, compatible with subacute infarct.    Otherwise, the kidneys are symmetric in appearance and enhancement with   no evidence of hydronephrosis, stone, or perinephric stranding   bilaterally. The renal veins are patent. There is no paracaval or   para-aortic adenopathy.    BLADDER: Within normal limits.  REPRODUCTIVE ORGANS: The prostate is not enlarged.    BOWEL: Rectosigmoid resection with reanastomosis. Small bowel resection   with reanastomosis. Again are noted fluid-filled patulous loops of small   and large bowel. No bowel obstruction. There is no mesenteric adenopathy.  PERITONEUM: No ascites.  VESSELS: Again is noted a dissection of the descending thoracic aorta and   abdominal aorta extending to the right external iliac artery and into the   bilateral renal arteries.  RETROPERITONEUM/LYMPH NODES: No lymphadenopathy. Shotty aortocaval lymph   nodes.  ABDOMINAL WALL: Within normal limits.  BONES: Posterior spondylitic ridging at L4-L5.    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 No evidence of   adenopathy.    --- End of Report ---            SATNAM GARCIA MD; Attending Radiologist  This document has been electronically signed. Oct  4 2023  3:06PM    < from: MUSTAPHA W or WO Ultrasound Enhancing Agent (12.12.23 @ 10:12) >     CONCLUSIONS:      1. Left ventricular systolic function is normal.   2. The left atrium is severely dilated.   3. A bioprosthetic valve replacement present in the aortic position. No prosthetic aortic stenosis. No intravalvular regurgitation No paravalvular regurgitation.   4. Bioprosthetic valve present. in the mitral position. Well seated prosthetic mitral valve with normal function with normal leaflet excursion. Transvalvular mitral gradients are normal. No prosthetic mitral stenosis. There is No intravalvular mitral regurgitation. Moderate paravalvular mitral (one jet) regurgitation, originating at 9 o'clock, directed centrally.   5. No pericardial effusion seen.   6. No echocardiographic evidenceof vegetations.   7. Compared to the transesophageal echocardiogram performed on 9/1/2023 Mitral valve vegetation is resolved.    __    < end of copied text >  < end of copied text >  RADIOLOGY & ADDITIONAL TESTS:

## 2024-04-12 NOTE — ED ADULT NURSE NOTE - NSFALLHARMRISKINTERV_ED_ALL_ED

## 2024-04-12 NOTE — ED PROVIDER NOTE - OBJECTIVE STATEMENT
62-year-old male with h/o HTN, aortic dissection s/p repair, CAD and VHD with prior CABG/bio-AVR/bio-MVR, bowel ischemia treated with resection, ESRD on dialysis (pt no longer on dialysis), AVMs s/p cauterization, atrial fibrillation/flutter with previous RFA, PVCs, admitted to [facility] in 6/2023 after being found down at home and subsequently found to have sudural/ subarachnoid hemorrhage, Eliquis was stopped and TTE at the time noted a possible echogenicity on the mitral valve, MUSTAPHA was deferred because of his clinical condition, ultimately discharged to rehab, followed by neurosurgery and restarted Eliquis 8/2023, MUSTAPHA performed 9/27/23 with +MV vegetation and source thought to be AV graft which was removed 10/1/23, treated with 6 week A/B course, repeat MUSTAPHA in December 2023 negative for any vegetations. AV fistula thought to be responsible for the infection has since been removed, scheduled for LAAO procedure today with Dr. Mosqueda sent in by electrophysiology for Cr 5 and Hgb <7. Pt states he has been in normal state of health until last night when he began N/V. Has not taken any of his medications in preparation for the procedure today. Denies dark stools or bleeding. No fever/chills, No headache, No photophobia/eye pain/changes in vision, No ear pain/sore throat/dysphagia, No chest pain/palpitations, no SOB/cough/wheeze/stridor, No abdominal pain, no dysuria/frequency/discharge, No neck/back pain, no rash, no changes in neurological status/function. No travel, No sick contacts.

## 2024-04-12 NOTE — H&P ADULT - ASSESSMENT
62-year-old male with h/o seizure, HTN, aortic dissection s/p repair, CAD and VHD with prior CABG/bio-AVR/bio-MVR, bowel ischemia treated with resection, ESRD was on dialysis in past (pt no longer on dialysis), AVMs s/p cauterization, atrial fibrillation/flutter with previous RFA, PVCs, sudural/subarachnoid hemorrhage, restarted Eliquis 8/2023, MUSTAPHA performed 9/27/23 with +MV vegetation and source thought to be AV graft which was removed 10/1/23, treated with 6 week A/B course, repeat MUSTAPHA in December 2023 negative for any vegetations. AV fistula thought to be responsible for the infection which has been removed.  He was schedule for BLAINE occlusion/Watchman implant today nd was noted to have SANTOS (Cr 5; baseline ~2) and anemia (Hg 7.1). He denies any bleeding issues or dark stools. Denies HA, fevers, chills, CP, palp, or SOB. He does report some recent balance issues, vertigo and episodes of N/V last night. He also reports chronic loose stools. No blood seen in stool/emesis. Pt is voiding urine w/o any issues. Pt states last emesis last night after he drunk plenty of Gatorade. Pt also drunk 2 beers. No vomiting since came to ed,ambulate to bathroom w/o any dizziness. Pt states he has on/off dizziness last one month.Last dose of eliquis last night. seen by  last week. Pt is found to have sbp 244 at ed.    Hypertensive urgency  -Telemetry  -sbp was 244  -now  179. will give stat dose of clonidin,coreg,amlodipine. isosorbide   -Pt did not take any meds today  -[facility]  -Monitor bp closely  -prn iv lebatalol,iv hydralazine            Acute on CKD st 3  -hx of prior HD   -cr 5.12 , baseline 1.8-2  -Bladder scan  -renal US  -I/O  -Spoke with  is covering for .    Anemia  ? Anemia chronic disease  -Repeat hb 7.9. last hb DEC 2023 11.1  -stool occult  -iron panel  -ppi bid now  -No active bleed present  -hold eliquis  -dvt ppx scd's      HX afib/flutter  -plan for BLAINE occlusion/Watchman implant today which is cancelled  -f/u ep rec    HX aortic dissection s/p repair, CAD and VHD with prior CABG/bio-AVR/bio-MVR  -Continue home meds  -hold eliquis  hold lasix    sudural/subarachnoid hemorrhage,  seizure  -on keppra at home  -per pharmcy adjust to renal dose   62-year-old male with h/o seizure, HTN, aortic dissection s/p repair, CAD and VHD with prior CABG/bio-AVR/bio-MVR, bowel ischemia treated with resection, ESRD was on dialysis in past (pt no longer on dialysis), AVMs s/p cauterization, atrial fibrillation/flutter with previous RFA, PVCs, sudural/subarachnoid hemorrhage, restarted Eliquis 8/2023, MUSTAPHA performed 9/27/23 with +MV vegetation and source thought to be AV graft which was removed 10/1/23, treated with 6 week A/B course, repeat MUSTAPHA in December 2023 negative for any vegetations. AV fistula thought to be responsible for the infection which has been removed.  He was schedule for BLAINE occlusion/Watchman implant today nd was noted to have SANTOS (Cr 5; baseline ~2) and anemia (Hg 7.1). He denies any bleeding issues or dark stools. Denies HA, fevers, chills, CP, palp, or SOB. He does report some recent balance issues, vertigo and episodes of N/V last night. He also reports chronic loose stools. No blood seen in stool/emesis. Pt is voiding urine w/o any issues. Pt states last emesis last night after he drunk plenty of Gatorade. Pt also drunk 2 beers. No vomiting since came to ed,ambulate to bathroom w/o any dizziness. Pt states he has on/off dizziness last one month.Last dose of eliquis last night. seen by  last week. Pt is found to have sbp 244 at ed.    Hypertensive urgency  -Telemetry  -sbp was 244  -now  179. will give stat dose of clonidin,coreg,amlodipine. isosorbide   -Pt did not take any meds today  -[facility]  -Monitor bp closely  -prn iv lebatalol,iv hydralazine            Acute on CKD st 3  -hx of prior HD   -cr 5.12 , baseline 1.8-2  -Bladder scan  -renal US  -I/O  -Spoke with  is covering for .    Anemia  ? Anemia chronic disease  -Repeat hb 7.9. last hb DEC 2023 11.1  -stool occult  -iron panel  -ppi bid now  -No active bleed present  -hold eliquis  -dvt ppx scd's      HX afib/flutter  -plan for BLAINE occlusion/Watchman implant today which is cancelled  -f/u ep rec    HX aortic dissection s/p repair, CAD and VHD with prior CABG/bio-AVR/bio-MVR  -Continue home meds  -hold eliquis  hold lasix      hx  seizure  -on keppra at home  -per pharmcy adjust to renal dose    dvt ppx scd;s    Plan of care dw pt  dw nephrology

## 2024-04-13 LAB
ALBUMIN SERPL ELPH-MCNC: 3.5 G/DL — SIGNIFICANT CHANGE UP (ref 3.3–5.2)
ALP SERPL-CCNC: 70 U/L — SIGNIFICANT CHANGE UP (ref 40–120)
ALT FLD-CCNC: 11 U/L — SIGNIFICANT CHANGE UP
AMPHET UR-MCNC: NEGATIVE — SIGNIFICANT CHANGE UP
ANION GAP SERPL CALC-SCNC: 18 MMOL/L — HIGH (ref 5–17)
AST SERPL-CCNC: 12 U/L — SIGNIFICANT CHANGE UP
BARBITURATES UR SCN-MCNC: NEGATIVE — SIGNIFICANT CHANGE UP
BENZODIAZ UR-MCNC: NEGATIVE — SIGNIFICANT CHANGE UP
BILIRUB SERPL-MCNC: 0.8 MG/DL — SIGNIFICANT CHANGE UP (ref 0.4–2)
BUN SERPL-MCNC: 58.3 MG/DL — HIGH (ref 8–20)
CALCIUM SERPL-MCNC: 8.5 MG/DL — SIGNIFICANT CHANGE UP (ref 8.4–10.5)
CHLORIDE SERPL-SCNC: 103 MMOL/L — SIGNIFICANT CHANGE UP (ref 96–108)
CHOLEST SERPL-MCNC: 105 MG/DL — SIGNIFICANT CHANGE UP
CO2 SERPL-SCNC: 20 MMOL/L — LOW (ref 22–29)
COCAINE METAB.OTHER UR-MCNC: NEGATIVE — SIGNIFICANT CHANGE UP
CREAT SERPL-MCNC: 5.43 MG/DL — HIGH (ref 0.5–1.3)
EGFR: 11 ML/MIN/1.73M2 — LOW
FERRITIN SERPL-MCNC: 183 NG/ML — SIGNIFICANT CHANGE UP (ref 30–400)
GLUCOSE SERPL-MCNC: 96 MG/DL — SIGNIFICANT CHANGE UP (ref 70–99)
HCT VFR BLD CALC: 23.8 % — LOW (ref 39–50)
HDLC SERPL-MCNC: 46 MG/DL — SIGNIFICANT CHANGE UP
HGB BLD-MCNC: 7.6 G/DL — LOW (ref 13–17)
IRON SATN MFR SERPL: 17 % — SIGNIFICANT CHANGE UP (ref 16–55)
IRON SATN MFR SERPL: 56 UG/DL — LOW (ref 59–158)
LIPID PNL WITH DIRECT LDL SERPL: 42 MG/DL — SIGNIFICANT CHANGE UP
MAGNESIUM SERPL-MCNC: 1.7 MG/DL — LOW (ref 1.8–2.6)
MCHC RBC-ENTMCNC: 29.2 PG — SIGNIFICANT CHANGE UP (ref 27–34)
MCHC RBC-ENTMCNC: 31.9 GM/DL — LOW (ref 32–36)
MCV RBC AUTO: 91.5 FL — SIGNIFICANT CHANGE UP (ref 80–100)
METHADONE UR-MCNC: NEGATIVE — SIGNIFICANT CHANGE UP
NON HDL CHOLESTEROL: 59 MG/DL — SIGNIFICANT CHANGE UP
OPIATES UR-MCNC: NEGATIVE — SIGNIFICANT CHANGE UP
PCP SPEC-MCNC: SIGNIFICANT CHANGE UP
PCP UR-MCNC: NEGATIVE — SIGNIFICANT CHANGE UP
PLATELET # BLD AUTO: 137 K/UL — LOW (ref 150–400)
POTASSIUM SERPL-MCNC: 3.2 MMOL/L — LOW (ref 3.5–5.3)
POTASSIUM SERPL-SCNC: 3.2 MMOL/L — LOW (ref 3.5–5.3)
PROT SERPL-MCNC: 6.6 G/DL — SIGNIFICANT CHANGE UP (ref 6.6–8.7)
RBC # BLD: 2.6 M/UL — LOW (ref 4.2–5.8)
RBC # FLD: 13.8 % — SIGNIFICANT CHANGE UP (ref 10.3–14.5)
SODIUM SERPL-SCNC: 141 MMOL/L — SIGNIFICANT CHANGE UP (ref 135–145)
SODIUM UR-SCNC: 60 MMOL/L — SIGNIFICANT CHANGE UP
THC UR QL: POSITIVE
TIBC SERPL-MCNC: 322 UG/DL — SIGNIFICANT CHANGE UP (ref 220–430)
TRANSFERRIN SERPL-MCNC: 225 MG/DL — SIGNIFICANT CHANGE UP (ref 180–329)
TRIGL SERPL-MCNC: 85 MG/DL — SIGNIFICANT CHANGE UP
WBC # BLD: 6.06 K/UL — SIGNIFICANT CHANGE UP (ref 3.8–10.5)
WBC # FLD AUTO: 6.06 K/UL — SIGNIFICANT CHANGE UP (ref 3.8–10.5)

## 2024-04-13 PROCEDURE — 99222 1ST HOSP IP/OBS MODERATE 55: CPT

## 2024-04-13 PROCEDURE — 99233 SBSQ HOSP IP/OBS HIGH 50: CPT

## 2024-04-13 RX ORDER — POTASSIUM CHLORIDE 20 MEQ
20 PACKET (EA) ORAL ONCE
Refills: 0 | Status: COMPLETED | OUTPATIENT
Start: 2024-04-13 | End: 2024-04-13

## 2024-04-13 RX ORDER — MAGNESIUM SULFATE 500 MG/ML
2 VIAL (ML) INJECTION ONCE
Refills: 0 | Status: COMPLETED | OUTPATIENT
Start: 2024-04-13 | End: 2024-04-13

## 2024-04-13 RX ORDER — HYDRALAZINE HCL 50 MG
50 TABLET ORAL EVERY 12 HOURS
Refills: 0 | Status: DISCONTINUED | OUTPATIENT
Start: 2024-04-13 | End: 2024-04-17

## 2024-04-13 RX ORDER — IRON SUCROSE 20 MG/ML
200 INJECTION, SOLUTION INTRAVENOUS EVERY 24 HOURS
Refills: 0 | Status: COMPLETED | OUTPATIENT
Start: 2024-04-13 | End: 2024-04-16

## 2024-04-13 RX ADMIN — Medication 10 MILLIGRAM(S): at 00:05

## 2024-04-13 RX ADMIN — Medication 0.1 MILLIGRAM(S): at 06:18

## 2024-04-13 RX ADMIN — LEVETIRACETAM 500 MILLIGRAM(S): 250 TABLET, FILM COATED ORAL at 06:18

## 2024-04-13 RX ADMIN — Medication 0.1 MILLIGRAM(S): at 17:08

## 2024-04-13 RX ADMIN — LEVETIRACETAM 500 MILLIGRAM(S): 250 TABLET, FILM COATED ORAL at 18:02

## 2024-04-13 RX ADMIN — Medication 3 MILLIGRAM(S): at 00:06

## 2024-04-13 RX ADMIN — PANTOPRAZOLE SODIUM 40 MILLIGRAM(S): 20 TABLET, DELAYED RELEASE ORAL at 06:21

## 2024-04-13 RX ADMIN — TAMSULOSIN HYDROCHLORIDE 0.4 MILLIGRAM(S): 0.4 CAPSULE ORAL at 22:01

## 2024-04-13 RX ADMIN — IRON SUCROSE 200 MILLIGRAM(S): 20 INJECTION, SOLUTION INTRAVENOUS at 09:38

## 2024-04-13 RX ADMIN — Medication 50 MILLIGRAM(S): at 17:08

## 2024-04-13 RX ADMIN — Medication 20 MILLIEQUIVALENT(S): at 14:57

## 2024-04-13 RX ADMIN — SODIUM CHLORIDE 70 MILLILITER(S): 9 INJECTION INTRAMUSCULAR; INTRAVENOUS; SUBCUTANEOUS at 03:44

## 2024-04-13 RX ADMIN — ATORVASTATIN CALCIUM 40 MILLIGRAM(S): 80 TABLET, FILM COATED ORAL at 21:59

## 2024-04-13 RX ADMIN — Medication 50 MILLIGRAM(S): at 09:38

## 2024-04-13 RX ADMIN — Medication 650 MILLIGRAM(S): at 07:30

## 2024-04-13 RX ADMIN — PANTOPRAZOLE SODIUM 40 MILLIGRAM(S): 20 TABLET, DELAYED RELEASE ORAL at 17:08

## 2024-04-13 RX ADMIN — CARVEDILOL PHOSPHATE 6.25 MILLIGRAM(S): 80 CAPSULE, EXTENDED RELEASE ORAL at 06:17

## 2024-04-13 RX ADMIN — Medication 1 TABLET(S): at 15:06

## 2024-04-13 RX ADMIN — Medication 650 MILLIGRAM(S): at 07:29

## 2024-04-13 RX ADMIN — Medication 25 GRAM(S): at 14:58

## 2024-04-13 RX ADMIN — CARVEDILOL PHOSPHATE 6.25 MILLIGRAM(S): 80 CAPSULE, EXTENDED RELEASE ORAL at 17:08

## 2024-04-13 RX ADMIN — ISOSORBIDE MONONITRATE 30 MILLIGRAM(S): 60 TABLET, EXTENDED RELEASE ORAL at 11:56

## 2024-04-13 RX ADMIN — AMLODIPINE BESYLATE 10 MILLIGRAM(S): 2.5 TABLET ORAL at 06:18

## 2024-04-13 NOTE — CONSULT NOTE ADULT - SUBJECTIVE AND OBJECTIVE BOX
63-year-old gentleman with multiple medical problems admitted now with acute on chronic kidney failure and noted to have a hemoglobin of around 8 g down from 9 g in October.He is on anticoagulation and admits that he occasionally sees some maroon stool.  However he says the last time was at least a month ago.  He has had colon resections and small bowel resections and he has had colonoscopies in the past.  He is not certain how long it has been since his last colonoscopy.His CBC shows a normochromic normocytic anemia and he has normal iron studies.Overnight his creatinine has continued to climb slightly.  He is being evaluated by renal.        Patient is a 63y old  Male who presents with a chief complaint of anemia,acute renal failure (13 Apr 2024 07:53)        PAST MEDICAL & SURGICAL HISTORY:  CHF (congestive heart failure)      CVA (cerebral vascular accident)      Seizures  last seizure 10 years ago      HTN (hypertension)      Hyperlipemia      COVID-19  october 2020      Atrial fibrillation      Former smoker      History of cocaine use  remote hx, currently sober      H/O aortic dissection  s/p repair (2010), surgery complicated by bowel ischemia s/p bowel resection and ostomy with reversal      H/O aortic valve insufficiency      Mitral regurgitation      GIB (gastrointestinal bleeding)      CAD (coronary artery disease)  s/p PCI 1998  and CABG x 2 11/2020      Chronic atrial fibrillation      H/O colectomy      Status post double vessel coronary artery bypass  11/2020 Dr Butler      S/P AVR (aortic valve replacement)  (t)AVR 11/2020 Dr Butler      S/P MVR (mitral valve replacement)  (t)MVR 11/2020 Dr Butler      H/O aortic dissection  Type A; emergent repair 2010      AV fistula  LUE          Allergies    penicillin (Other)    Intolerances        MEDICATIONS  (STANDING):  amLODIPine   Tablet 10 milliGRAM(s) Oral daily  atorvastatin 40 milliGRAM(s) Oral at bedtime  carvedilol 6.25 milliGRAM(s) Oral every 12 hours  cloNIDine 0.1 milliGRAM(s) Oral two times a day  hydrALAZINE 50 milliGRAM(s) Oral every 12 hours  iron sucrose Injectable 200 milliGRAM(s) IV Push every 24 hours  isosorbide   mononitrate ER Tablet (IMDUR) 30 milliGRAM(s) Oral daily  levETIRAcetam 500 milliGRAM(s) Oral two times a day  pantoprazole  Injectable 40 milliGRAM(s) IV Push every 12 hours  sodium chloride 0.9%. 1000 milliLiter(s) (70 mL/Hr) IV Continuous <Continuous>  tamsulosin 0.4 milliGRAM(s) Oral at bedtime    MEDICATIONS  (PRN):  acetaminophen     Tablet .. 650 milliGRAM(s) Oral every 6 hours PRN Temp greater or equal to 38C (100.4F), Mild Pain (1 - 3)  aluminum hydroxide/magnesium hydroxide/simethicone Suspension 30 milliLiter(s) Oral every 4 hours PRN Dyspepsia  hydrALAZINE Injectable 10 milliGRAM(s) IV Push every 4 hours PRN sbp>159  labetalol Injectable 10 milliGRAM(s) IV Push every 6 hours PRN Systolic blood pressure >160  melatonin 3 milliGRAM(s) Oral at bedtime PRN Insomnia  ondansetron Injectable 4 milliGRAM(s) IV Push every 8 hours PRN Nausea and/or Vomiting      Social History:    Marital Status:  (   )    (   ) Single    (   )    (  )   Occupation:   Lives with: (  ) alone  (  ) children   (  ) spouse   (  ) parents  (  ) other    Substance Use (street drugs): (  ) never used  (  ) other:  Tobacco Usage:  (   ) never smoked   (   ) former smoker   (   ) current smoker  (     ) pack year  (        ) last cigarette date  Alcohol Usage:  Sexual History:     Family History   IBD (  ) Yes   (  ) No  GI Malignancy (  )  Yes    (  ) No    Health Management     Last Colonoscopy -      (     ) Advanced Directives: (     ) None    (      ) DNR    (     ) DNI    (     ) Health Care Proxy:       REVIEW OF SYSTEMS:   General: Negative  HEENT: Negative  CV: Negative  Respiratory: Negative  GI: See HPI  : Negative  MSK: Negative  Hematologic: Negative  Skin: Negative      Vital Signs Last 24 Hrs  T(C): 36.5 (13 Apr 2024 08:13), Max: 36.9 (12 Apr 2024 23:39)  T(F): 97.7 (13 Apr 2024 08:13), Max: 98.4 (12 Apr 2024 23:39)  HR: 64 (13 Apr 2024 09:58) (56 - 87)  BP: 135/67 (13 Apr 2024 09:58) (135/67 - 244/101)  BP(mean): 102 (13 Apr 2024 04:28) (102 - 110)  RR: 18 (13 Apr 2024 08:13) (16 - 20)  SpO2: 96% (13 Apr 2024 08:13) (96% - 100%)    Parameters below as of 13 Apr 2024 08:13  Patient On (Oxygen Delivery Method): room air        PHYSICAL EXAM:   GENERAL:  No acute distress  HEENT:  NC/AT, conjunctiva clear, sclera anicteric  CHEST:  No increased effort, breath sounds clear  HEART:  Regular rhythm, S1, S2,   ABDOMEN: There are multiple scars. Soft, non-tender, non-distended, normoactive bowel sounds, no rebound or guarding  EXTREMITIES: No edema  SKIN:  Warm, dry  NEURO:  Calm, cooperative        LABS:                        7.6    6.06  )-----------( 137      ( 13 Apr 2024 02:42 )             23.8     04-13    141  |  103  |  58.3<H>  ----------------------------<  96  3.2<L>   |  20.0<L>  |  5.43<H>    Ca    8.5      13 Apr 2024 02:42  Mg     1.7     04-13    TPro  6.6  /  Alb  3.5  /  TBili  0.8  /  DBili  x   /  AST  12  /  ALT  11  /  AlkPhos  70  04-13    PT/INR - ( 12 Apr 2024 15:45 )   PT: 14.6 sec;   INR: 1.33 ratio         PTT - ( 12 Apr 2024 15:45 )  PTT:33.1 sec  Urinalysis Basic - ( 13 Apr 2024 02:42 )    Color: x / Appearance: x / SG: x / pH: x  Gluc: 96 mg/dL / Ketone: x  / Bili: x / Urobili: x   Blood: x / Protein: x / Nitrite: x   Leuk Esterase: x / RBC: x / WBC x   Sq Epi: x / Non Sq Epi: x / Bacteria: x      LIVER FUNCTIONS - ( 13 Apr 2024 02:42 )  Alb: 3.5 g/dL / Pro: 6.6 g/dL / ALK PHOS: 70 U/L / ALT: 11 U/L / AST: 12 U/L / GGT: x             RADIOLOGY & ADDITIONAL TESTS:

## 2024-04-13 NOTE — PATIENT PROFILE ADULT - FALL HARM RISK - HARM RISK INTERVENTIONS

## 2024-04-13 NOTE — PROGRESS NOTE ADULT - ASSESSMENT
63year-old male with h/o seizure, HTN, aortic dissection s/p repair, CAD and VHD with prior CABG/bio-AVR/bio-MVR, bowel ischemia treated with resection, ESRD was on dialysis in past (pt no longer on dialysis), AVMs s/p cauterization, atrial fibrillation/flutter with previous RFA, PVCs, sudural/subarachnoid hemorrhage, restarted Eliquis 8/2023, MUSTAPHA performed 9/27/23 with +MV vegetation and source thought to be AV graft which was removed 10/1/23, treated with 6 week A/B course, repeat MUSTAPHA in December 2023 negative for any vegetations. AV fistula thought to be responsible for the infection which has been removed.  He was schedule for BLAINE occlusion/Watchman implant today nd was noted to have SANTOS (Cr 5; baseline ~2) and anemia (Hg 7.1)He also reports chronic loose stools. No blood seen in stool/emesis. Pt is voiding urine w/o any issues.      Hypertensive urgency  -Telemetry  -sbp was 244  -clonidin,coreg,amlodipine. isosorbide   -CTH stable      Acute on CKD st 3  -hx of prior HD   -cr 5.12 , baseline 1.8-2  -renal US  renal following  may need to resume HD    Anemia  ? Anemia chronic disease  -Repeat hb 7.9. last hb DEC 2023 11.1  -iron panel  -ppi bid   -No active bleed present  -hold eliquis  -dvt ppx scd's  gi following : colonoscopy pending renal function stabilization      HX afib/flutter  -plan for BLAINE occlusion/Watchman implant today which was  cancelled  -EP following    HX aortic dissection s/p repair, CAD and VHD with prior CABG/bio-AVR/bio-MVR  -Continue home meds  -hold eliquis  hold lasix    hx  seizure  -on keppra at home  -per pharmcy adjust to renal dose    dvt ppx scd;s    Plan of care dw pt  dw nephrology

## 2024-04-13 NOTE — PATIENT PROFILE ADULT - FUNCTIONAL ASSESSMENT - BASIC MOBILITY 6.
4-calculated by average/Not able to assess (calculate score using [facility] averaging method)

## 2024-04-13 NOTE — PROGRESS NOTE ADULT - SUBJECTIVE AND OBJECTIVE BOX
gomez for renal failure     no acute complaints  still in er holding  ros negative     MEDICATIONS  (STANDING):  amLODIPine   Tablet 10 milliGRAM(s) Oral daily  atorvastatin 40 milliGRAM(s) Oral at bedtime  carvedilol 6.25 milliGRAM(s) Oral every 12 hours  cloNIDine 0.1 milliGRAM(s) Oral two times a day  hydrALAZINE 50 milliGRAM(s) Oral every 12 hours  iron sucrose Injectable 200 milliGRAM(s) IV Push every 24 hours  isosorbide   mononitrate ER Tablet (IMDUR) 30 milliGRAM(s) Oral daily  levETIRAcetam 500 milliGRAM(s) Oral two times a day  magnesium sulfate  IVPB 2 Gram(s) IV Intermittent once  Nephro-jeanie 1 Tablet(s) Oral daily  pantoprazole  Injectable 40 milliGRAM(s) IV Push every 12 hours  potassium chloride    Tablet ER 20 milliEquivalent(s) Oral once  sodium chloride 0.9%. 1000 milliLiter(s) (70 mL/Hr) IV Continuous <Continuous>  tamsulosin 0.4 milliGRAM(s) Oral at bedtime    MEDICATIONS  (PRN):  acetaminophen     Tablet .. 650 milliGRAM(s) Oral every 6 hours PRN Temp greater or equal to 38C (100.4F), Mild Pain (1 - 3)  aluminum hydroxide/magnesium hydroxide/simethicone Suspension 30 milliLiter(s) Oral every 4 hours PRN Dyspepsia  hydrALAZINE Injectable 10 milliGRAM(s) IV Push every 4 hours PRN sbp>159  labetalol Injectable 10 milliGRAM(s) IV Push every 6 hours PRN Systolic blood pressure >160  melatonin 3 milliGRAM(s) Oral at bedtime PRN Insomnia  ondansetron Injectable 4 milliGRAM(s) IV Push every 8 hours PRN Nausea and/or Vomiting      Allergies    penicillin (Other)      Vital Signs Last 24 Hrs  T(C): 36.4 (13 Apr 2024 11:16), Max: 36.9 (12 Apr 2024 23:39)  T(F): 97.5 (13 Apr 2024 11:16), Max: 98.4 (12 Apr 2024 23:39)  HR: 61 (13 Apr 2024 11:16) (56 - 87)  BP: 127/66 (13 Apr 2024 11:16) (127/66 - 244/101)  BP(mean): 102 (13 Apr 2024 04:28) (102 - 110)  RR: 18 (13 Apr 2024 11:16) (16 - 20)  SpO2: 96% (13 Apr 2024 11:16) (96% - 100%)    Parameters below as of 13 Apr 2024 11:16  Patient On (Oxygen Delivery Method): room air        PHYSICAL EXAM:    GENERAL: NAD  CHEST/LUNG: Clear to ausculation bilaterally;  HEART: Regular rate and rhythm; S1 S2  ABDOMEN: Soft, ; Bowel sounds present  EXTREMITIES:  no edema  NERVOUS SYSTEM:  Alert & Oriented X3, motor Strength 5/5 B/L upper and lower extremities  PSYCH: normal mood, appropriate response.    LABS:                        7.6    6.06  )-----------( 137      ( 13 Apr 2024 02:42 )             23.8     04-13    141  |  103  |  58.3<H>  ----------------------------<  96  3.2<L>   |  20.0<L>  |  5.43<H>    Ca    8.5      13 Apr 2024 02:42  Mg     1.7     04-13    TPro  6.6  /  Alb  3.5  /  TBili  0.8  /  DBili  x   /  AST  12  /  ALT  11  /  AlkPhos  70  04-13    PT/INR - ( 12 Apr 2024 15:45 )   PT: 14.6 sec;   INR: 1.33 ratio         PTT - ( 12 Apr 2024 15:45 )  PTT:33.1 sec  Urinalysis Basic - ( 13 Apr 2024 02:42 )    Color: x / Appearance: x / SG: x / pH: x  Gluc: 96 mg/dL / Ketone: x  / Bili: x / Urobili: x   Blood: x / Protein: x / Nitrite: x   Leuk Esterase: x / RBC: x / WBC x   Sq Epi: x / Non Sq Epi: x / Bacteria: x        CAPILLARY BLOOD GLUCOSE            RADIOLOGY & ADDITIONAL TESTS:

## 2024-04-13 NOTE — PROGRESS NOTE ADULT - ASSESSMENT
62 yo M w h/o HTN, aortic dissection s/p repair, CAD and VHD with prior CABG/bio-AVR/bio-MVR, bowel ischemia treated with resection, ESRD on dialysis (pt no longer on dialysis), AVMs s/p cauterization, atrial fibrillation/flutter with previous RFA, PVCs, sudural/subarachnoid hemorrhage, restarted Eliquis 8/2023, MUSTAPHA performed 9/27/23 with +MV vegetation and source thought to be AV graft which was removed 10/1/23, treated with 6 week A/B course, repeat MUSTAPHA in December 2023 negative for any vegetations. AV fistula thought to be responsible for the infection which has been removed.  He was schedule for BLAINE occlusion/Watchman implant today nd was noted to have SANTOS (Cr 5; baseline ~2) and anemia (Hg 7.1). He denies any bleeding issues or dark stools.    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---> Needs follow up   MUSTAPHA 12/23 noted - LV function normal     SANTOS on CKD III  Serum Cr 1.7 - 1/2023  Here because he was told out patient labs were abnormal  Admits to taking ADVIL PM every night --> possible interstitial nephritis   Takes Torsemide every day --> will hold diuretics  Renal US noted no hydronephrosis  No uremic symptoms no need for urgent dialysis but may need it depending on coarse    HTN BP not ideal will add Hydralazine 50 mg BID    Anemia should check FOBT w NSAID use hx  Once BP stable , can start Retacrit   Low iron stores will give IV Venofer    Monitor labs will follow

## 2024-04-13 NOTE — CONSULT NOTE ADULT - ASSESSMENT
Hilton presents with acute on chronic renal failure.  His hemoglobin is down somewhat.  There is some possible history of intermittent maroon stools on anticoagulation.  He will need a colonoscopy which can either be done in the hospital once his renal situation is sorted out or even semielectively as an outpatient.

## 2024-04-13 NOTE — PROGRESS NOTE ADULT - SUBJECTIVE AND OBJECTIVE BOX
NEPHROLOGY INTERVAL HPI/OVERNIGHT EVENTS:    Examined  Laying flat no dyspnea   Feels ok  No uremic symptoms  Denies HA CP no SOB      MEDICATIONS  (STANDING):  amLODIPine   Tablet 10 milliGRAM(s) Oral daily  atorvastatin 40 milliGRAM(s) Oral at bedtime  carvedilol 6.25 milliGRAM(s) Oral every 12 hours  cloNIDine 0.1 milliGRAM(s) Oral two times a day  isosorbide   mononitrate ER Tablet (IMDUR) 30 milliGRAM(s) Oral daily  levETIRAcetam 500 milliGRAM(s) Oral two times a day  pantoprazole  Injectable 40 milliGRAM(s) IV Push every 12 hours  sodium chloride 0.9%. 1000 milliLiter(s) (70 mL/Hr) IV Continuous <Continuous>  tamsulosin 0.4 milliGRAM(s) Oral at bedtime    MEDICATIONS  (PRN):  acetaminophen     Tablet .. 650 milliGRAM(s) Oral every 6 hours PRN Temp greater or equal to 38C (100.4F), Mild Pain (1 - 3)  aluminum hydroxide/magnesium hydroxide/simethicone Suspension 30 milliLiter(s) Oral every 4 hours PRN Dyspepsia  hydrALAZINE Injectable 10 milliGRAM(s) IV Push every 4 hours PRN sbp>159  labetalol Injectable 10 milliGRAM(s) IV Push every 6 hours PRN Systolic blood pressure >160  melatonin 3 milliGRAM(s) Oral at bedtime PRN Insomnia  ondansetron Injectable 4 milliGRAM(s) IV Push every 8 hours PRN Nausea and/or Vomiting      Allergies    penicillin (Other)    Intolerances        Vital Signs Last 24 Hrs  T(C): 36.7 (13 Apr 2024 04:28), Max: 36.9 (12 Apr 2024 23:39)  T(F): 98 (13 Apr 2024 04:28), Max: 98.4 (12 Apr 2024 23:39)  HR: 66 (13 Apr 2024 04:28) (63 - 87)  BP: 164/71 (13 Apr 2024 04:28) (164/71 - 244/101)  BP(mean): 102 (13 Apr 2024 04:28) (102 - 110)  RR: 18 (13 Apr 2024 04:28) (16 - 20)  SpO2: 96% (13 Apr 2024 04:28) (96% - 100%)    Parameters below as of 13 Apr 2024 04:28  Patient On (Oxygen Delivery Method): room air      Daily Height in cm: 172.72 (12 Apr 2024 14:08)    Daily     PHYSICAL EXAM:  GENERAL: NAD, feels well   HEAD: NCAT  NECK: Supple, No JVD,  NERVOUS SYSTEM:  Alert & Oriented X3, No asterixis   CHEST/LUNG: Clear / EAE   HEART: Regular rate and rhythm; No rub   ABDOMEN: Soft, Nontender, Nondistended  EXTREMITIES:  No edema     LABS:                        7.6    6.06  )-----------( 137      ( 13 Apr 2024 02:42 )             23.8     04-13    141  |  103  |  58.3<H>  ----------------------------<  96  3.2<L>   |  20.0<L>  |  5.43<H>    Ca    8.5      13 Apr 2024 02:42  Mg     1.7     04-13    TPro  6.6  /  Alb  3.5  /  TBili  0.8  /  DBili  x   /  AST  12  /  ALT  11  /  AlkPhos  70  04-13    PT/INR - ( 12 Apr 2024 15:45 )   PT: 14.6 sec;   INR: 1.33 ratio         PTT - ( 12 Apr 2024 15:45 )  PTT:33.1 sec  Urinalysis Basic - ( 13 Apr 2024 02:42 )    Color: x / Appearance: x / SG: x / pH: x  Gluc: 96 mg/dL / Ketone: x  / Bili: x / Urobili: x   Blood: x / Protein: x / Nitrite: x   Leuk Esterase: x / RBC: x / WBC x   Sq Epi: x / Non Sq Epi: x / Bacteria: x      Magnesium: 1.7 mg/dL (04-13 @ 02:42)  Magnesium: 1.7 mg/dL (04-12 @ 15:45)          RADIOLOGY & ADDITIONAL TESTS:

## 2024-04-14 LAB
ALBUMIN SERPL ELPH-MCNC: 3.5 G/DL — SIGNIFICANT CHANGE UP (ref 3.3–5.2)
ALP SERPL-CCNC: 64 U/L — SIGNIFICANT CHANGE UP (ref 40–120)
ALT FLD-CCNC: 10 U/L — SIGNIFICANT CHANGE UP
ANION GAP SERPL CALC-SCNC: 16 MMOL/L — SIGNIFICANT CHANGE UP (ref 5–17)
AST SERPL-CCNC: 10 U/L — SIGNIFICANT CHANGE UP
BILIRUB SERPL-MCNC: 0.6 MG/DL — SIGNIFICANT CHANGE UP (ref 0.4–2)
BUN SERPL-MCNC: 59.3 MG/DL — HIGH (ref 8–20)
CALCIUM SERPL-MCNC: 8.4 MG/DL — SIGNIFICANT CHANGE UP (ref 8.4–10.5)
CHLORIDE SERPL-SCNC: 105 MMOL/L — SIGNIFICANT CHANGE UP (ref 96–108)
CO2 SERPL-SCNC: 22 MMOL/L — SIGNIFICANT CHANGE UP (ref 22–29)
CREAT SERPL-MCNC: 5.79 MG/DL — HIGH (ref 0.5–1.3)
EGFR: 10 ML/MIN/1.73M2 — LOW
GLUCOSE SERPL-MCNC: 95 MG/DL — SIGNIFICANT CHANGE UP (ref 70–99)
HCT VFR BLD CALC: 23.2 % — LOW (ref 39–50)
HGB BLD-MCNC: 7.4 G/DL — LOW (ref 13–17)
MAGNESIUM SERPL-MCNC: 2.6 MG/DL — SIGNIFICANT CHANGE UP (ref 1.6–2.6)
MCHC RBC-ENTMCNC: 29.1 PG — SIGNIFICANT CHANGE UP (ref 27–34)
MCHC RBC-ENTMCNC: 31.9 GM/DL — LOW (ref 32–36)
MCV RBC AUTO: 91.3 FL — SIGNIFICANT CHANGE UP (ref 80–100)
PLATELET # BLD AUTO: 145 K/UL — LOW (ref 150–400)
POTASSIUM SERPL-MCNC: 3.6 MMOL/L — SIGNIFICANT CHANGE UP (ref 3.5–5.3)
POTASSIUM SERPL-SCNC: 3.6 MMOL/L — SIGNIFICANT CHANGE UP (ref 3.5–5.3)
PROT SERPL-MCNC: 6.3 G/DL — LOW (ref 6.6–8.7)
RBC # BLD: 2.54 M/UL — LOW (ref 4.2–5.8)
RBC # FLD: 13.7 % — SIGNIFICANT CHANGE UP (ref 10.3–14.5)
SODIUM SERPL-SCNC: 142 MMOL/L — SIGNIFICANT CHANGE UP (ref 135–145)
WBC # BLD: 6.1 K/UL — SIGNIFICANT CHANGE UP (ref 3.8–10.5)
WBC # FLD AUTO: 6.1 K/UL — SIGNIFICANT CHANGE UP (ref 3.8–10.5)

## 2024-04-14 PROCEDURE — 99231 SBSQ HOSP IP/OBS SF/LOW 25: CPT

## 2024-04-14 PROCEDURE — 99233 SBSQ HOSP IP/OBS HIGH 50: CPT

## 2024-04-14 RX ORDER — SODIUM CHLORIDE 9 MG/ML
1000 INJECTION INTRAMUSCULAR; INTRAVENOUS; SUBCUTANEOUS
Refills: 0 | Status: DISCONTINUED | OUTPATIENT
Start: 2024-04-14 | End: 2024-04-15

## 2024-04-14 RX ORDER — CYCLOBENZAPRINE HYDROCHLORIDE 10 MG/1
5 TABLET, FILM COATED ORAL THREE TIMES A DAY
Refills: 0 | Status: DISCONTINUED | OUTPATIENT
Start: 2024-04-14 | End: 2024-04-22

## 2024-04-14 RX ORDER — ALPRAZOLAM 0.25 MG
0.25 TABLET ORAL THREE TIMES A DAY
Refills: 0 | Status: DISCONTINUED | OUTPATIENT
Start: 2024-04-14 | End: 2024-04-21

## 2024-04-14 RX ADMIN — Medication 0.1 MILLIGRAM(S): at 18:05

## 2024-04-14 RX ADMIN — Medication 50 MILLIGRAM(S): at 05:29

## 2024-04-14 RX ADMIN — CYCLOBENZAPRINE HYDROCHLORIDE 5 MILLIGRAM(S): 10 TABLET, FILM COATED ORAL at 21:30

## 2024-04-14 RX ADMIN — CARVEDILOL PHOSPHATE 6.25 MILLIGRAM(S): 80 CAPSULE, EXTENDED RELEASE ORAL at 05:30

## 2024-04-14 RX ADMIN — AMLODIPINE BESYLATE 10 MILLIGRAM(S): 2.5 TABLET ORAL at 05:30

## 2024-04-14 RX ADMIN — Medication 50 MILLIGRAM(S): at 18:05

## 2024-04-14 RX ADMIN — CARVEDILOL PHOSPHATE 6.25 MILLIGRAM(S): 80 CAPSULE, EXTENDED RELEASE ORAL at 18:05

## 2024-04-14 RX ADMIN — LEVETIRACETAM 500 MILLIGRAM(S): 250 TABLET, FILM COATED ORAL at 05:30

## 2024-04-14 RX ADMIN — IRON SUCROSE 200 MILLIGRAM(S): 20 INJECTION, SOLUTION INTRAVENOUS at 09:03

## 2024-04-14 RX ADMIN — TAMSULOSIN HYDROCHLORIDE 0.4 MILLIGRAM(S): 0.4 CAPSULE ORAL at 21:30

## 2024-04-14 RX ADMIN — Medication 1 TABLET(S): at 09:03

## 2024-04-14 RX ADMIN — ISOSORBIDE MONONITRATE 30 MILLIGRAM(S): 60 TABLET, EXTENDED RELEASE ORAL at 09:05

## 2024-04-14 RX ADMIN — ATORVASTATIN CALCIUM 40 MILLIGRAM(S): 80 TABLET, FILM COATED ORAL at 21:30

## 2024-04-14 RX ADMIN — PANTOPRAZOLE SODIUM 40 MILLIGRAM(S): 20 TABLET, DELAYED RELEASE ORAL at 05:30

## 2024-04-14 RX ADMIN — Medication 0.1 MILLIGRAM(S): at 05:30

## 2024-04-14 RX ADMIN — SODIUM CHLORIDE 82 MILLILITER(S): 9 INJECTION INTRAMUSCULAR; INTRAVENOUS; SUBCUTANEOUS at 12:10

## 2024-04-14 RX ADMIN — Medication 0.25 MILLIGRAM(S): at 13:39

## 2024-04-14 RX ADMIN — PANTOPRAZOLE SODIUM 40 MILLIGRAM(S): 20 TABLET, DELAYED RELEASE ORAL at 18:05

## 2024-04-14 RX ADMIN — CYCLOBENZAPRINE HYDROCHLORIDE 5 MILLIGRAM(S): 10 TABLET, FILM COATED ORAL at 13:39

## 2024-04-14 RX ADMIN — LEVETIRACETAM 500 MILLIGRAM(S): 250 TABLET, FILM COATED ORAL at 18:05

## 2024-04-14 NOTE — PROGRESS NOTE ADULT - ASSESSMENT
64 yo M w h/o HTN, aortic dissection s/p repair, CAD and VHD with prior CABG/bio-AVR/bio-MVR, bowel ischemia treated with resection, ESRD on dialysis (pt no longer on dialysis), AVMs s/p cauterization, atrial fibrillation/flutter with previous RFA, PVCs, sudural/subarachnoid hemorrhage, restarted Eliquis 8/2023, MUSTAPHA performed 9/27/23 with +MV vegetation and source thought to be AV graft which was removed 10/1/23, treated with 6 week A/B course, repeat MUSTAPHA in December 2023 negative for any vegetations. AV fistula thought to be responsible for the infection which has been removed.  He was schedule for BLAINE occlusion/Watchman implant today nd was noted to have SANTOS (Cr 5; baseline ~2) and anemia (Hg 7.1). He denies any bleeding issues or dark stools.    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---> Needs follow up   MUSTAPHA 12/23 noted - LV function normal     SANTOS on CKD III  Serum Cr 1.7 - 1/2023  Rising Cr 4.7--> 5.4--> 5.7  Here because he was told out patient labs were abnormal  Admits to taking ADVIL PM every night --> possible interstitial nephritis   Takes Torsemide every day --> hold diuretics  Renal US noted no hydronephrosis  No uremic symptoms no need for urgent dialysis but may need it depending on coarse  Needs strict I&Os     HTN  BP ok on current regimen    Anemia should check FOBT w NSAID use hx  Once BP stable , can start Retacrit   Low iron stores will give IV Venofer    Monitor labs will follow

## 2024-04-14 NOTE — PROGRESS NOTE ADULT - SUBJECTIVE AND OBJECTIVE BOX
seen for ARF    complaining of neck pain/spasms  poor sleep   ros negative   ambulating in room/hallways     MEDICATIONS  (STANDING):  amLODIPine   Tablet 10 milliGRAM(s) Oral daily  atorvastatin 40 milliGRAM(s) Oral at bedtime  carvedilol 6.25 milliGRAM(s) Oral every 12 hours  cloNIDine 0.1 milliGRAM(s) Oral two times a day  hydrALAZINE 50 milliGRAM(s) Oral every 12 hours  iron sucrose Injectable 200 milliGRAM(s) IV Push every 24 hours  isosorbide   mononitrate ER Tablet (IMDUR) 30 milliGRAM(s) Oral daily  levETIRAcetam 500 milliGRAM(s) Oral two times a day  Nephro-jeanie 1 Tablet(s) Oral daily  pantoprazole  Injectable 40 milliGRAM(s) IV Push every 12 hours  sodium chloride 0.9%. 1000 milliLiter(s) (82 mL/Hr) IV Continuous <Continuous>  tamsulosin 0.4 milliGRAM(s) Oral at bedtime    MEDICATIONS  (PRN):  acetaminophen     Tablet .. 650 milliGRAM(s) Oral every 6 hours PRN Temp greater or equal to 38C (100.4F), Mild Pain (1 - 3)  ALPRAZolam 0.25 milliGRAM(s) Oral three times a day PRN anxiety  aluminum hydroxide/magnesium hydroxide/simethicone Suspension 30 milliLiter(s) Oral every 4 hours PRN Dyspepsia  cyclobenzaprine 5 milliGRAM(s) Oral three times a day PRN Muscle Spasm  hydrALAZINE Injectable 10 milliGRAM(s) IV Push every 4 hours PRN sbp>159  labetalol Injectable 10 milliGRAM(s) IV Push every 6 hours PRN Systolic blood pressure >160  melatonin 3 milliGRAM(s) Oral at bedtime PRN Insomnia  ondansetron Injectable 4 milliGRAM(s) IV Push every 8 hours PRN Nausea and/or Vomiting      Allergies    penicillin (Other)      Vital Signs Last 24 Hrs  T(C): 36.5 (14 Apr 2024 08:44), Max: 36.7 (14 Apr 2024 05:00)  T(F): 97.7 (14 Apr 2024 08:44), Max: 98.1 (14 Apr 2024 05:00)  HR: 58 (14 Apr 2024 08:44) (57 - 75)  BP: 117/55 (14 Apr 2024 08:44) (117/55 - 172/85)  BP(mean): --  RR: 18 (14 Apr 2024 08:44) (18 - 18)  SpO2: 97% (14 Apr 2024 08:44) (94% - 97%)    Parameters below as of 14 Apr 2024 08:44  Patient On (Oxygen Delivery Method): room air        PHYSICAL EXAM:    GENERAL: NAD  CHEST/LUNG: Clear to ausculation bilaterally  HEART: Regular rate and rhythm; S1 S2  ABDOMEN: Soft, Nondistended; Bowel sounds present  EXTREMITIES: no edema   NERVOUS SYSTEM:  Alert & Oriented X3,  Motor Strength 5/5 B/L upper and lower extremities  PSYCH: normal mood, appropriate response.    LABS:                        7.4    6.10  )-----------( 145      ( 14 Apr 2024 04:55 )             23.2     04-14    142  |  105  |  59.3<H>  ----------------------------<  95  3.6   |  22.0  |  5.79<H>    Ca    8.4      14 Apr 2024 04:55  Mg     2.6     04-14    TPro  6.3<L>  /  Alb  3.5  /  TBili  0.6  /  DBili  x   /  AST  10  /  ALT  10  /  AlkPhos  64  04-14    PT/INR - ( 12 Apr 2024 15:45 )   PT: 14.6 sec;   INR: 1.33 ratio         PTT - ( 12 Apr 2024 15:45 )  PTT:33.1 sec  Urinalysis Basic - ( 14 Apr 2024 04:55 )    Color: x / Appearance: x / SG: x / pH: x  Gluc: 95 mg/dL / Ketone: x  / Bili: x / Urobili: x   Blood: x / Protein: x / Nitrite: x   Leuk Esterase: x / RBC: x / WBC x   Sq Epi: x / Non Sq Epi: x / Bacteria: x        CAPILLARY BLOOD GLUCOSE            RADIOLOGY & ADDITIONAL TESTS:

## 2024-04-14 NOTE — PROGRESS NOTE ADULT - ASSESSMENT
63year-old male with h/o seizure, HTN, aortic dissection s/p repair, CAD and VHD with prior CABG/bio-AVR/bio-MVR, bowel ischemia treated with resection, ESRD was on dialysis in past (pt no longer on dialysis), AVMs s/p cauterization, atrial fibrillation/flutter with previous RFA, PVCs, sudural/subarachnoid hemorrhage, restarted Eliquis 8/2023, MUSTAPHA performed 9/27/23 with +MV vegetation and source thought to be AV graft which was removed 10/1/23, treated with 6 week A/B course, repeat MUSTAPHA in December 2023 negative for any vegetations. AV fistula thought to be responsible for the infection which has been removed.  He was schedule for BLAINE occlusion/Watchman implant today nd was noted to have SANTOS (Cr 5; baseline ~2) and anemia (Hg 7.1)He also reports chronic loose stools. No blood seen in stool/emesis. Pt is voiding urine w/o any issues.      Hypertensive urgency  -Telemetry  -sbp was 244  -clonidin,coreg,amlodipine. isosorbide   -CTH stable      Acute on CKD st 3  -hx of prior HD   -cr 5.12 , baseline 1.8-2  renal following  may need to resume HD    Anemia  ? Anemia chronic disease  -Repeat hb 7.9. last hb DEC 2023 11.1  -iron panel--> venofer  -ppi bid   -No active bleed present  -hold eliquis  -dvt ppx scd's  gi following : colonoscopy pending renal function stabilization      HX afib/flutter  -plan for BLAINE occlusion/Watchman implant today which was  cancelled  -EP following    HX aortic dissection s/p repair, CAD and VHD with prior CABG/bio-AVR/bio-MVR  -Continue home meds  -hold eliquis  hold lasix    hx  seizure  -on keppra at home  -per pharmacy adjust to renal dose    dvt ppx scd;s    Plan of care dw pt  dw nephrology

## 2024-04-14 NOTE — PROGRESS NOTE ADULT - SUBJECTIVE AND OBJECTIVE BOX
Patient without complaints.  Creatinine is still rising.  Hemoglobin remains completely stable.  He has not seen any evidence of the maroon stool that he sees occasionally.  He tells me that there are potential plans for dialysis tomorrow.  GI will follow from afar but we can certainly proceed with his colonoscopy as an outpatient also.

## 2024-04-14 NOTE — PROGRESS NOTE ADULT - SUBJECTIVE AND OBJECTIVE BOX
NEPHROLOGY INTERVAL HPI/OVERNIGHT EVENTS:    Examined  Laying flat no dyspnea   Feels ok  No uremic symptoms  Denies HA CP no SOB  Reports good UOP    MEDICATIONS  (STANDING):  amLODIPine   Tablet 10 milliGRAM(s) Oral daily  atorvastatin 40 milliGRAM(s) Oral at bedtime  carvedilol 6.25 milliGRAM(s) Oral every 12 hours  cloNIDine 0.1 milliGRAM(s) Oral two times a day  hydrALAZINE 50 milliGRAM(s) Oral every 12 hours  iron sucrose Injectable 200 milliGRAM(s) IV Push every 24 hours  isosorbide   mononitrate ER Tablet (IMDUR) 30 milliGRAM(s) Oral daily  levETIRAcetam 500 milliGRAM(s) Oral two times a day  Nephro-jeanie 1 Tablet(s) Oral daily  pantoprazole  Injectable 40 milliGRAM(s) IV Push every 12 hours  sodium chloride 0.9%. 1000 milliLiter(s) (70 mL/Hr) IV Continuous <Continuous>  tamsulosin 0.4 milliGRAM(s) Oral at bedtime    MEDICATIONS  (PRN):  acetaminophen     Tablet .. 650 milliGRAM(s) Oral every 6 hours PRN Temp greater or equal to 38C (100.4F), Mild Pain (1 - 3)  aluminum hydroxide/magnesium hydroxide/simethicone Suspension 30 milliLiter(s) Oral every 4 hours PRN Dyspepsia  hydrALAZINE Injectable 10 milliGRAM(s) IV Push every 4 hours PRN sbp>159  labetalol Injectable 10 milliGRAM(s) IV Push every 6 hours PRN Systolic blood pressure >160  melatonin 3 milliGRAM(s) Oral at bedtime PRN Insomnia  ondansetron Injectable 4 milliGRAM(s) IV Push every 8 hours PRN Nausea and/or Vomiting      Allergies    penicillin (Other)    Intolerances        Vital Signs Last 24 Hrs  T(C): 36.5 (2024 08:44), Max: 36.7 (2024 05:00)  T(F): 97.7 (2024 08:44), Max: 98.1 (2024 05:00)  HR: 58 (2024 08:44) (57 - 75)  BP: 117/55 (2024 08:44) (117/55 - 172/85)  BP(mean): --  RR: 18 (2024 08:44) (18 - 18)  SpO2: 97% (2024 08:44) (94% - 97%)    Parameters below as of 2024 08:44  Patient On (Oxygen Delivery Method): room air      Daily     Daily Weight in k.3 (2024 05:00)    PHYSICAL EXAM:  GENERAL: NAD, feels well   HEAD: NCAT  NECK: Supple, No JVD,  NERVOUS SYSTEM:  Alert & Oriented X3, No asterixis   CHEST/LUNG: Clear / EAE   HEART: Regular rate and rhythm; No rub   ABDOMEN: Soft, Nontender, Nondistended  EXTREMITIES:  No edema     LABS:                        7.4    6.10  )-----------( 145      ( 2024 04:55 )             23.2     -14    142  |  105  |  59.3<H>  ----------------------------<  95  3.6   |  22.0  |  5.79<H>    Ca    8.4      2024 04:55  Mg     2.6     -14    TPro  6.3<L>  /  Alb  3.5  /  TBili  0.6  /  DBili  x   /  AST  10  /  ALT  10  /  AlkPhos  64  04-14    PT/INR - ( 2024 15:45 )   PT: 14.6 sec;   INR: 1.33 ratio         PTT - ( 2024 15:45 )  PTT:33.1 sec  Urinalysis Basic - ( 2024 04:55 )    Color: x / Appearance: x / SG: x / pH: x  Gluc: 95 mg/dL / Ketone: x  / Bili: x / Urobili: x   Blood: x / Protein: x / Nitrite: x   Leuk Esterase: x / RBC: x / WBC x   Sq Epi: x / Non Sq Epi: x / Bacteria: x      Magnesium: 2.6 mg/dL ( @ 04:55)          RADIOLOGY & ADDITIONAL TESTS:

## 2024-04-15 LAB
ANION GAP SERPL CALC-SCNC: 16 MMOL/L — SIGNIFICANT CHANGE UP (ref 5–17)
BUN SERPL-MCNC: 59 MG/DL — HIGH (ref 8–20)
CALCIUM SERPL-MCNC: 7.5 MG/DL — LOW (ref 8.4–10.5)
CHLORIDE SERPL-SCNC: 105 MMOL/L — SIGNIFICANT CHANGE UP (ref 96–108)
CO2 SERPL-SCNC: 19 MMOL/L — LOW (ref 22–29)
CREAT SERPL-MCNC: 6.1 MG/DL — HIGH (ref 0.5–1.3)
EGFR: 10 ML/MIN/1.73M2 — LOW
GLUCOSE SERPL-MCNC: 87 MG/DL — SIGNIFICANT CHANGE UP (ref 70–99)
HCT VFR BLD CALC: 21.2 % — LOW (ref 39–50)
HGB BLD-MCNC: 6.8 G/DL — CRITICAL LOW (ref 13–17)
MCHC RBC-ENTMCNC: 29.7 PG — SIGNIFICANT CHANGE UP (ref 27–34)
MCHC RBC-ENTMCNC: 32.1 GM/DL — SIGNIFICANT CHANGE UP (ref 32–36)
MCV RBC AUTO: 92.6 FL — SIGNIFICANT CHANGE UP (ref 80–100)
PLATELET # BLD AUTO: 125 K/UL — LOW (ref 150–400)
POTASSIUM SERPL-MCNC: 3.3 MMOL/L — LOW (ref 3.5–5.3)
POTASSIUM SERPL-SCNC: 3.3 MMOL/L — LOW (ref 3.5–5.3)
RBC # BLD: 2.29 M/UL — LOW (ref 4.2–5.8)
RBC # FLD: 13.6 % — SIGNIFICANT CHANGE UP (ref 10.3–14.5)
SODIUM SERPL-SCNC: 140 MMOL/L — SIGNIFICANT CHANGE UP (ref 135–145)
WBC # BLD: 5.82 K/UL — SIGNIFICANT CHANGE UP (ref 3.8–10.5)
WBC # FLD AUTO: 5.82 K/UL — SIGNIFICANT CHANGE UP (ref 3.8–10.5)

## 2024-04-15 PROCEDURE — 99233 SBSQ HOSP IP/OBS HIGH 50: CPT

## 2024-04-15 PROCEDURE — 71045 X-RAY EXAM CHEST 1 VIEW: CPT | Mod: 26

## 2024-04-15 RX ORDER — SODIUM CHLORIDE 9 MG/ML
1000 INJECTION, SOLUTION INTRAVENOUS
Refills: 0 | Status: DISCONTINUED | OUTPATIENT
Start: 2024-04-15 | End: 2024-04-17

## 2024-04-15 RX ORDER — ERYTHROPOIETIN 10000 [IU]/ML
20000 INJECTION, SOLUTION INTRAVENOUS; SUBCUTANEOUS ONCE
Refills: 0 | Status: COMPLETED | OUTPATIENT
Start: 2024-04-15 | End: 2024-04-15

## 2024-04-15 RX ADMIN — PANTOPRAZOLE SODIUM 40 MILLIGRAM(S): 20 TABLET, DELAYED RELEASE ORAL at 06:09

## 2024-04-15 RX ADMIN — Medication 0.25 MILLIGRAM(S): at 01:05

## 2024-04-15 RX ADMIN — PANTOPRAZOLE SODIUM 40 MILLIGRAM(S): 20 TABLET, DELAYED RELEASE ORAL at 17:27

## 2024-04-15 RX ADMIN — CARVEDILOL PHOSPHATE 6.25 MILLIGRAM(S): 80 CAPSULE, EXTENDED RELEASE ORAL at 06:09

## 2024-04-15 RX ADMIN — Medication 1 TABLET(S): at 17:25

## 2024-04-15 RX ADMIN — LEVETIRACETAM 500 MILLIGRAM(S): 250 TABLET, FILM COATED ORAL at 06:08

## 2024-04-15 RX ADMIN — IRON SUCROSE 200 MILLIGRAM(S): 20 INJECTION, SOLUTION INTRAVENOUS at 17:23

## 2024-04-15 RX ADMIN — ATORVASTATIN CALCIUM 40 MILLIGRAM(S): 80 TABLET, FILM COATED ORAL at 21:34

## 2024-04-15 RX ADMIN — TAMSULOSIN HYDROCHLORIDE 0.4 MILLIGRAM(S): 0.4 CAPSULE ORAL at 21:34

## 2024-04-15 RX ADMIN — Medication 0.1 MILLIGRAM(S): at 06:08

## 2024-04-15 RX ADMIN — ERYTHROPOIETIN 20000 UNIT(S): 10000 INJECTION, SOLUTION INTRAVENOUS; SUBCUTANEOUS at 14:30

## 2024-04-15 RX ADMIN — SODIUM CHLORIDE 82 MILLILITER(S): 9 INJECTION INTRAMUSCULAR; INTRAVENOUS; SUBCUTANEOUS at 00:04

## 2024-04-15 RX ADMIN — ISOSORBIDE MONONITRATE 30 MILLIGRAM(S): 60 TABLET, EXTENDED RELEASE ORAL at 13:17

## 2024-04-15 RX ADMIN — Medication 50 MILLIGRAM(S): at 06:08

## 2024-04-15 RX ADMIN — Medication 50 MILLIGRAM(S): at 17:26

## 2024-04-15 RX ADMIN — SODIUM CHLORIDE 100 MILLILITER(S): 9 INJECTION, SOLUTION INTRAVENOUS at 14:30

## 2024-04-15 RX ADMIN — Medication 650 MILLIGRAM(S): at 22:52

## 2024-04-15 RX ADMIN — Medication 0.25 MILLIGRAM(S): at 22:52

## 2024-04-15 RX ADMIN — AMLODIPINE BESYLATE 10 MILLIGRAM(S): 2.5 TABLET ORAL at 06:08

## 2024-04-15 RX ADMIN — Medication 650 MILLIGRAM(S): at 23:52

## 2024-04-15 RX ADMIN — LEVETIRACETAM 500 MILLIGRAM(S): 250 TABLET, FILM COATED ORAL at 17:26

## 2024-04-15 NOTE — DIETITIAN INITIAL EVALUATION ADULT - ORAL INTAKE PTA/DIET HISTORY
Nutrition consult completed. Interviewed pt this morning at bedside. Denied N/V/C/D. No issues chewing/swallowing. Good po intake and appetite reported, consumed 100% breakfast. Good po intake at meals prior to admission, does not follow a diet at home or take vit/min supplements. Reported NKFA. Reported UBW of 165#. Denied weight loss/weight gain. Declined nutrition education.

## 2024-04-15 NOTE — PROGRESS NOTE ADULT - SUBJECTIVE AND OBJECTIVE BOX
NEPHROLOGY INTERVAL HPI/OVERNIGHT EVENTS:    Examined  Feels ok has no complaints  Looks well  No uremic symptoms  Denies HA CP no SOB  Reports good UOP      MEDICATIONS  (STANDING):  amLODIPine   Tablet 10 milliGRAM(s) Oral daily  atorvastatin 40 milliGRAM(s) Oral at bedtime  carvedilol 6.25 milliGRAM(s) Oral every 12 hours  cloNIDine 0.1 milliGRAM(s) Oral two times a day  hydrALAZINE 50 milliGRAM(s) Oral every 12 hours  iron sucrose Injectable 200 milliGRAM(s) IV Push every 24 hours  isosorbide   mononitrate ER Tablet (IMDUR) 30 milliGRAM(s) Oral daily  levETIRAcetam 500 milliGRAM(s) Oral two times a day  Nephro-jeanie 1 Tablet(s) Oral daily  pantoprazole  Injectable 40 milliGRAM(s) IV Push every 12 hours  sodium chloride 0.45% 1000 milliLiter(s) (100 mL/Hr) IV Continuous <Continuous>  sodium chloride 0.9%. 1000 milliLiter(s) (82 mL/Hr) IV Continuous <Continuous>  tamsulosin 0.4 milliGRAM(s) Oral at bedtime    MEDICATIONS  (PRN):  acetaminophen     Tablet .. 650 milliGRAM(s) Oral every 6 hours PRN Temp greater or equal to 38C (100.4F), Mild Pain (1 - 3)  ALPRAZolam 0.25 milliGRAM(s) Oral three times a day PRN anxiety  aluminum hydroxide/magnesium hydroxide/simethicone Suspension 30 milliLiter(s) Oral every 4 hours PRN Dyspepsia  cyclobenzaprine 5 milliGRAM(s) Oral three times a day PRN Muscle Spasm  hydrALAZINE Injectable 10 milliGRAM(s) IV Push every 4 hours PRN sbp>159  labetalol Injectable 10 milliGRAM(s) IV Push every 6 hours PRN Systolic blood pressure >160  melatonin 3 milliGRAM(s) Oral at bedtime PRN Insomnia  ondansetron Injectable 4 milliGRAM(s) IV Push every 8 hours PRN Nausea and/or Vomiting      Allergies    penicillin (Other)    Intolerances        Vital Signs Last 24 Hrs  T(C): 36.4 (15 Apr 2024 12:13), Max: 36.7 (2024 16:17)  T(F): 97.5 (15 Apr 2024 12:13), Max: 98 (2024 16:17)  HR: 54 (15 Apr 2024 12:13) (52 - 62)  BP: 132/73 (15 Apr 2024 12:13) (127/67 - 152/72)  BP(mean): 88 (2024 20:57) (86 - 88)  RR: 18 (15 Apr 2024 12:13) (18 - 20)  SpO2: 96% (15 Apr 2024 12:13) (92% - 96%)    Parameters below as of 15 Apr 2024 12:13  Patient On (Oxygen Delivery Method): room air      Daily     Daily Weight in k.6 (15 Apr 2024 05:21)    PHYSICAL EXAM:  GENERAL: NAD, feels well   HEAD: NCAT  NECK: Supple, No JVD,  NERVOUS SYSTEM:  Alert & Oriented X3, No asterixis   CHEST/LUNG: Clear / EAE   HEART: Regular rate and rhythm; No rub   ABDOMEN: Soft, Nontender, Nondistended  EXTREMITIES:  No edema     LABS:                        6.8    5.82  )-----------( 125      ( 15 Apr 2024 04:12 )             21.2     04-15    140  |  105  |  59.0<H>  ----------------------------<  87  3.3<L>   |  19.0<L>  |  6.10<H>    Ca    7.5<L>      15 Apr 2024 04:12  Mg     2.6     04-14    TPro  6.3<L>  /  Alb  3.5  /  TBili  0.6  /  DBili  x   /  AST  10  /  ALT  10  /  AlkPhos  64  04-14      Urinalysis Basic - ( 15 Apr 2024 04:12 )    Color: x / Appearance: x / SG: x / pH: x  Gluc: 87 mg/dL / Ketone: x  / Bili: x / Urobili: x   Blood: x / Protein: x / Nitrite: x   Leuk Esterase: x / RBC: x / WBC x   Sq Epi: x / Non Sq Epi: x / Bacteria: x              RADIOLOGY & ADDITIONAL TESTS:

## 2024-04-15 NOTE — DIETITIAN INITIAL EVALUATION ADULT - PERTINENT LABORATORY DATA
04-15    140  |  105  |  59.0<H>  ----------------------------<  87  3.3<L>   |  19.0<L>  |  6.10<H>    Ca    7.5<L>      15 Apr 2024 04:12  Mg     2.6     04-14    TPro  6.3<L>  /  Alb  3.5  /  TBili  0.6  /  DBili  x   /  AST  10  /  ALT  10  /  AlkPhos  64  04-14  A1C with Estimated Average Glucose Result: 5.2 % (05-27-23 @ 04:55)

## 2024-04-15 NOTE — DIETITIAN INITIAL EVALUATION ADULT - PERTINENT MEDS FT
MEDICATIONS  (STANDING):  hydrALAZINE 50 milliGRAM(s) Oral every 12 hours  iron sucrose Injectable 200 milliGRAM(s) IV Push every 24 hours  Nephro-jeanie 1 Tablet(s) Oral daily  pantoprazole  Injectable 40 milliGRAM(s) IV Push every 12 hours  sodium chloride 0.9%. 1000 milliLiter(s) (82 mL/Hr) IV Continuous <Continuous>    MEDICATIONS  (PRN):  ondansetron Injectable 4 milliGRAM(s) IV Push every 8 hours PRN Nausea and/or Vomiting

## 2024-04-15 NOTE — PROGRESS NOTE ADULT - ASSESSMENT
62 yo M w h/o HTN, aortic dissection s/p repair, CAD and VHD with prior CABG/bio-AVR/bio-MVR, bowel ischemia treated with resection, ESRD on dialysis (pt no longer on dialysis), AVMs s/p cauterization, atrial fibrillation/flutter with previous RFA, PVCs, sudural/subarachnoid hemorrhage, restarted Eliquis 8/2023, MUSTAPHA performed 9/27/23 with +MV vegetation and source thought to be AV graft which was removed 10/1/23, treated with 6 week A/B course, repeat MUSTAPHA in December 2023 negative for any vegetations. AV fistula thought to be responsible for the infection which has been removed.  He was schedule for BLAINE occlusion/Watchman implant today nd was noted to have SANTOS (Cr 5; baseline ~2) and anemia (Hg 7.1). He denies any bleeding issues or dark stools.    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---> Needs follow up   MUSTAPHA 12/23 noted - LV function normal     SANTOS on CKD III  Serum Cr 1.7 - 1/2023  Rising Cr 4.7--> 5.4--> 5.7 --> 6.1  Here because he was told out patient labs were abnormal  Admits to taking ADVIL PM every night --> possible interstitial nephritis   Takes Torsemide every day --> hold diuretics for now  Renal US noted no hydronephrosis  is on trial of IVF will inc rate and adjust   CXR ordered   No uremic symptoms no need for urgent dialysis but may need it depending on coarse  Needs strict I&Os     HTN  BP ok on current regimen    Anemia should check FOBT w NSAID use hx--> transfusion today  Will restart Retacrit 20 K units one dose today  Low iron stores on  IV Venofer    Monitor labs will follow

## 2024-04-15 NOTE — PROGRESS NOTE ADULT - ASSESSMENT
63year-old male with h/o seizure, HTN, aortic dissection s/p repair, CAD and VHD with prior CABG/bio-AVR/bio-MVR, bowel ischemia treated with resection, ESRD was on dialysis in past (pt no longer on dialysis), AVMs s/p cauterization, atrial fibrillation/flutter with previous RFA, PVCs, subdural/subarachnoid hemorrhage, restarted Eliquis 8/2023, MUSTAPHA performed 9/27/23 with +MV vegetation and source thought to be AV graft which was removed 10/1/23, treated with 6 week A/B course, repeat MUSTAPHA in December 2023 negative for any vegetations. AV fistula thought to be responsible for the infection which has been removed.  He was schedule for BLAINE occlusion/Watchman implant today and was noted to have SANTOS (Cr 5; baseline ~2) and anemia (Hg 7.1)He also reports chronic loose stools. No blood seen in stool/emesis. Pt is voiding urine w/o any issues.      Hypertensive urgency  -Telemetry  -sbp was 244  -clonidin,coreg,amlodipine. isosorbide   -CTH stable    Acute on CKD st 3  -hx of prior HD   -cr 5.12 , baseline 1.8-2  renal following  may need to resume HD    Anemia  ? Anemia chronic disease  -Repeat hb 7.9. last hb DEC 2023 11.1  -iron panel--> venofer  -ppi bid   -hold eliquis  -dvt ppx scd's  1 U PRBC today  gi following : colonoscopy pending renal function stabilization      HX afib/flutter  -plan for BLAINE occlusion/Watchman implant which was  cancelled  -EP following    HX aortic dissection s/p repair, CAD and VHD with prior CABG/bio-AVR/bio-MVR  -Continue home meds  -hold eliquis  hold lasix    hx  seizure  -on keppra at home  -per pharmacy adjust to renal dose    dvt ppx scd;s    Plan of care dw pt  dw nephrology

## 2024-04-15 NOTE — DIETITIAN INITIAL EVALUATION ADULT - ADD RECOMMEND
1) Consult received for "MST Score +/>2." Upon chart review, patient with stable weight, does not currently meet criteria for Protein Calorie Malnutrition risk per MST. RD remains available for assessment per protocol or as needed.  2) Rx MVI daily   3) Continue diet as tolerated.   4) Monitor weights daily for trend/accuracy   5) monitor labs

## 2024-04-15 NOTE — PROGRESS NOTE ADULT - SUBJECTIVE AND OBJECTIVE BOX
seen for anemia, renal failure  gen weakness  ros negative     MEDICATIONS  (STANDING):  amLODIPine   Tablet 10 milliGRAM(s) Oral daily  atorvastatin 40 milliGRAM(s) Oral at bedtime  carvedilol 6.25 milliGRAM(s) Oral every 12 hours  cloNIDine 0.1 milliGRAM(s) Oral two times a day  epoetin yolanda-epbx (RETACRIT) Injectable 42479 Unit(s) SubCutaneous once  hydrALAZINE 50 milliGRAM(s) Oral every 12 hours  iron sucrose Injectable 200 milliGRAM(s) IV Push every 24 hours  isosorbide   mononitrate ER Tablet (IMDUR) 30 milliGRAM(s) Oral daily  levETIRAcetam 500 milliGRAM(s) Oral two times a day  Nephro-jeanie 1 Tablet(s) Oral daily  pantoprazole  Injectable 40 milliGRAM(s) IV Push every 12 hours  sodium chloride 0.45% 1000 milliLiter(s) (100 mL/Hr) IV Continuous <Continuous>  tamsulosin 0.4 milliGRAM(s) Oral at bedtime    MEDICATIONS  (PRN):  acetaminophen     Tablet .. 650 milliGRAM(s) Oral every 6 hours PRN Temp greater or equal to 38C (100.4F), Mild Pain (1 - 3)  ALPRAZolam 0.25 milliGRAM(s) Oral three times a day PRN anxiety  aluminum hydroxide/magnesium hydroxide/simethicone Suspension 30 milliLiter(s) Oral every 4 hours PRN Dyspepsia  cyclobenzaprine 5 milliGRAM(s) Oral three times a day PRN Muscle Spasm  hydrALAZINE Injectable 10 milliGRAM(s) IV Push every 4 hours PRN sbp>159  labetalol Injectable 10 milliGRAM(s) IV Push every 6 hours PRN Systolic blood pressure >160  melatonin 3 milliGRAM(s) Oral at bedtime PRN Insomnia  ondansetron Injectable 4 milliGRAM(s) IV Push every 8 hours PRN Nausea and/or Vomiting      Allergies    penicillin (Other)        Vital Signs Last 24 Hrs  T(C): 36.4 (15 Apr 2024 12:13), Max: 36.7 (14 Apr 2024 16:17)  T(F): 97.5 (15 Apr 2024 12:13), Max: 98 (14 Apr 2024 16:17)  HR: 55 (15 Apr 2024 13:15) (52 - 62)  BP: 133/78 (15 Apr 2024 13:15) (127/67 - 152/72)  BP(mean): 88 (14 Apr 2024 20:57) (86 - 88)  RR: 18 (15 Apr 2024 12:13) (18 - 20)  SpO2: 96% (15 Apr 2024 12:13) (92% - 96%)    Parameters below as of 15 Apr 2024 12:13  Patient On (Oxygen Delivery Method): room air        PHYSICAL EXAM:    GENERAL: NAD  CHEST/LUNG: Clear to ausculation bilaterally  HEART: Regular rate and rhythm; S1 S2  ABDOMEN: Soft, Nontender, Bowel sounds present  EXTREMITIES:  no edema   NERVOUS SYSTEM:  Alert & Oriented X3, Motor Strength 5/5 B/L upper and lower extremities  PSYCH: normal mood, appropriate response.    LABS:                        6.8    5.82  )-----------( 125      ( 15 Apr 2024 04:12 )             21.2     04-15    140  |  105  |  59.0<H>  ----------------------------<  87  3.3<L>   |  19.0<L>  |  6.10<H>    Ca    7.5<L>      15 Apr 2024 04:12  Mg     2.6     04-14    TPro  6.3<L>  /  Alb  3.5  /  TBili  0.6  /  DBili  x   /  AST  10  /  ALT  10  /  AlkPhos  64  04-14      Urinalysis Basic - ( 15 Apr 2024 04:12 )    Color: x / Appearance: x / SG: x / pH: x  Gluc: 87 mg/dL / Ketone: x  / Bili: x / Urobili: x   Blood: x / Protein: x / Nitrite: x   Leuk Esterase: x / RBC: x / WBC x   Sq Epi: x / Non Sq Epi: x / Bacteria: x        CAPILLARY BLOOD GLUCOSE            RADIOLOGY & ADDITIONAL TESTS:

## 2024-04-15 NOTE — DIETITIAN INITIAL EVALUATION ADULT - OTHER INFO
62-year-old male with h/o HTN, aortic dissection s/p repair, CAD and VHD with prior CABG/bio-AVR/bio-MVR, bowel ischemia treated with resection, ESRD on dialysis (pt no longer on dialysis), AVMs s/p cauterization, atrial fibrillation/flutter with previous RFA, PVCs, sudural/subarachnoid hemorrhage, restarted Eliquis 8/2023, MUSTAPHA performed 9/27/23 with +MV vegetation and source thought to be AV graft which was removed 10/1/23, treated with 6 week A/B course, repeat MUSTAPHA in December 2023 negative for any vegetations. AV fistula thought to be responsible for the infection which has been removed.  He was schedule for BLAINE occlusion/Watchman implant today nd was noted to have SANTOS (Cr 5; baseline ~2) and anemia (Hg 7.1). SANTOS on CKD III.

## 2024-04-15 NOTE — DIETITIAN INITIAL EVALUATION ADULT - NS FNS DIET ORDER
Diet, Renal Restrictions:   For patients receiving Renal Replacement - No Protein Restr, No Conc K, No Conc Phos, Low Sodium (04-13-24 @ 13:05)

## 2024-04-15 NOTE — DIETITIAN INITIAL EVALUATION ADULT - NUTRITION CONSULT
DASH Eating Plan   WHAT YOU NEED TO KNOW:   The DASH (Dietary Approaches to Stop Hypertension) Eating Plan is designed to help prevent or lower high blood pressure  It can also help to lower LDL (bad) cholesterol and decrease your risk for heart disease  The plan is low in sodium, sugar, unhealthy fats, and total fat  It is high in potassium, calcium, magnesium, and fiber  These nutrients are added when you eat more fruits, vegetables, and whole grains  With the DASH eating plan, you need to eat a certain number of servings from each food group  This will help you get enough of certain nutrients and limit others  The amount of servings you should eat depends on how many calories you need  Your dietitian can help you create meal plans with the right number of servings for each food group  DISCHARGE INSTRUCTIONS:   What you need to know about sodium:  Your dietitian will tell you how much sodium is safe for you to have each day  People with high blood pressure should have no more than 1,500 to 2,300 mg of sodium in a day  A teaspoon (tsp) of salt has 2,300 mg of sodium  This may seem like a difficult goal, but small changes to the foods you eat can make a big difference  Your healthcare provider or dietitian can help you create a meal plan that follows your sodium limit  Read food labels  Food labels can help you choose foods that are low in sodium  The amount of sodium is listed in milligrams (mg)  The % Daily Value (DV) column tells you how much of your daily needs are met by 1 serving of the food for each nutrient listed  Choose foods that have less than 5% of the DV of sodium  These foods are considered low in sodium  Foods that have 20% or more of the DV of sodium are considered high in sodium  Avoid foods that have more than 300 mg of sodium in each serving  Choose foods that say low-sodium, reduced-sodium, or no salt added on the food label  Limit added salt    Do not salt food at the table if you add salt when you cook  Use herbs and spices, such as onions, garlic, and salt-free seasonings to add flavor  Try lemon or lime juice or vinegar to add a tart flavor  Use hot peppers or a small amount of hot pepper sauce to add a spicy flavor  Limit foods high in added salt, such as the following:    Seasonings made with salt, such as garlic salt, celery salt, onion salt, seasoned salt, meat tenderizers, and monosodium glutamate (MSG)    Miso soup and canned or dried soup mixes    Regular soy sauce, barbecue sauce, teriyaki sauce, steak sauce, Worcestershire sauce, and most flavored vinegars    Snack foods, such as salted chips, popcorn, pretzels, pork rinds, salted crackers, and salted nuts    Frozen foods, such as dinners, entrees, vegetables with sauces, and breaded meats    Ask about salt substitutes  Ask your healthcare provider if you may use salt substitutes  Some salt substitutes have ingredients that can be harmful if you have certain health conditions  Choose foods carefully at restaurants  Meals from restaurants, especially fast food restaurants, are often high in sodium  Some restaurants have nutrition information that tells you the amount of sodium in their foods  Ask to have your food prepared with less, or no salt  What you need to know about fats:  Healthy fats include unsaturated fats and omega-3 fatty acids  Unhealthy fats include saturated fats and trans fats    Include healthy fats, such as the following:      Cooking oils, such as soybean, canola, olive, or sunflower    Fatty fish, such as salmon, tuna, mackerel, or sardines    Flaxseed oil or ground flaxseed    ½ cup of cooked beans, such as black beans, kidney beans, or lynn beans    1½ ounces of low-sodium nuts, such as almonds or walnuts    Low-sugar, low-sodium peanut butter    Seeds such as alina seeds or sunflower seeds       Limit or do not have unhealthy fats, such as the following:      Foods that contain fat from animals, such as fatty meats, whole milk, butter, and cream    Shortening, stick margarine, palm oil, and coconut oil    Full-fat or creamy salad dressing    Creamy soup    Crackers, chips, and baked goods made with margarine or shortening    Foods that are fried in unhealthy fats    Gravy and sauces, such as Johnny or cheese sauces    What you need to know about carbohydrates (carbs): All carbs break down into sugar  Complex carbs contain more fiber than simple carbs  This means complex carbs go into the bloodstream more slowly and cause less of a blood sugar spike  Try to include more complex carbs and fewer simple carbs  Include complex carbs, such as the followin slice of whole-grain bread    1 ounce of dry cereal that does not contain added sugar    ½ cup of cooked oatmeal    2 ounces of cooked whole-grain pasta    ½ cup of cooked brown rice    Limit or do not have simple carbs, such as the following:      [employer], such as doughnuts, pastries, and cookies    Mixes for cornbread and biscuits    White rice and pasta mixes, such as boxed macaroni and cheese    Instant and cold cereals that contain sugar    Jelly, jam, and ice cream that contain sugar    Condiments such as ketchup    Drinks high in sugar, such as soft drinks, lemonade, and fruit juice    What you need to know about vegetables and fruits:  Vegetables and fruits can be fresh, frozen, or canned  If possible, try to choose low-sodium canned options    Include a variety of vegetables and fruits, such as the followin medium apple, pear, or peach (about ½ cup chopped)    ½ small banana    ½ cup berries, such as blueberries, strawberries, or blackberries    1 cup of raw leafy greens, such as lettuce, spinach, kale, or cele greens    ½ cup of frozen or canned (no added salt) vegetables, such as green beans    ½ cup of fresh, frozen, or canned fruit (canned in light syrup or fruit juice)    ½ cup of vegetable or fruit juice    Limit or do not have vegetables and fruits made in the following ways:      Frozen fruit such as cherries that have added sugar    Fruit in cream or butter sauce    Canned vegetables that are high in sodium    Sauerkraut, pickled vegetables, and other foods prepared in brine    Fried vegetables or vegetables in butter or high-fat sauces    What you need to know about protein foods: Include lean or low-fat protein foods, such as the following:      Poultry (chicken, turkey) with no skin    Fish (especially fatty fish, such as salmon, fresh tuna, or mackerel)    Lean beef and pork (loin, round, extra lean hamburger)    Egg whites and egg substitutes    1 cup of nonfat (skim) or 1% milk    1½ ounces of fat-free or low-fat cheese    6 ounces of nonfat or low-fat yogurt    Limit or do not have high-fat protein foods, such as the following:      Smoked or cured meat, such as corned beef, robin, ham, hot dogs, and sausage    Canned beans and canned meats or spreads, such as potted meats, sardines, anchovies, and imitation seafood    Deli or lunch meats, such as bologna, ham, turkey, and roast beef    High-fat meat (T-bone steak, regular hamburger, and ribs)    Whole eggs and egg yolks    Whole milk, 2% milk, and cream    Regular cheese and processed cheese    Other guidelines to follow:   Maintain a healthy weight  Your risk for heart disease is higher if you are overweight  Your healthcare provider may suggest that you lose weight if you are overweight  You can lose weight by eating fewer calories and foods that have added sugars and fat  The DASH meal plan can help you do this  Decrease calories by eating smaller portions at each meal and fewer snacks  Ask your healthcare provider for more information about how to lose weight  Exercise regularly  Regular exercise can help you reach or maintain a healthy weight  Regular exercise can also help decrease your blood pressure and improve your cholesterol levels   Get 30 minutes or more of moderate exercise each day of the week  To lose weight, get at least 60 minutes of exercise  Talk to your healthcare provider about the best exercise program for you  Limit alcohol  Women should limit alcohol to 1 drink a day  Men should limit alcohol to 2 drinks a day  A drink of alcohol is 12 ounces of beer, 5 ounces of wine, or 1½ ounces of liquor  For more information:   [facility] [address]  [address] [address]  [place] [address]  Phone: 4- 850 [phone]  Web Address: [facility] no    © 5063 [facility] 2022 Information is for End User's use only and may not be sold, redistributed or otherwise used for commercial purposes  All illustrations and images included in CareNotes® are the copyrighted property of A D A M , Inc  or [facility] Sanam Godoy   The above information is an  only  It is not intended as medical advice for individual conditions or treatments  Talk to your doctor, nurse or pharmacist before following any medical regimen to see if it is safe and effective for you  yes

## 2024-04-16 DIAGNOSIS — N18.6 END STAGE RENAL DISEASE: ICD-10-CM

## 2024-04-16 DIAGNOSIS — I47.29 OTHER VENTRICULAR TACHYCARDIA: ICD-10-CM

## 2024-04-16 LAB
ALBUMIN SERPL ELPH-MCNC: 3.2 G/DL — LOW (ref 3.3–5.2)
ALP SERPL-CCNC: 65 U/L — SIGNIFICANT CHANGE UP (ref 40–120)
ALT FLD-CCNC: 9 U/L — SIGNIFICANT CHANGE UP
ANION GAP SERPL CALC-SCNC: 16 MMOL/L — SIGNIFICANT CHANGE UP (ref 5–17)
AST SERPL-CCNC: 6 U/L — SIGNIFICANT CHANGE UP
BILIRUB SERPL-MCNC: 0.3 MG/DL — LOW (ref 0.4–2)
BUN SERPL-MCNC: 62.2 MG/DL — HIGH (ref 8–20)
CALCIUM SERPL-MCNC: 7.7 MG/DL — LOW (ref 8.4–10.5)
CHLORIDE SERPL-SCNC: 106 MMOL/L — SIGNIFICANT CHANGE UP (ref 96–108)
CO2 SERPL-SCNC: 19 MMOL/L — LOW (ref 22–29)
CREAT SERPL-MCNC: 6.27 MG/DL — HIGH (ref 0.5–1.3)
EGFR: 9 ML/MIN/1.73M2 — LOW
GLUCOSE SERPL-MCNC: 94 MG/DL — SIGNIFICANT CHANGE UP (ref 70–99)
HCT VFR BLD CALC: 24.3 % — LOW (ref 39–50)
HGB BLD-MCNC: 7.7 G/DL — LOW (ref 13–17)
MAGNESIUM SERPL-MCNC: 1.9 MG/DL — SIGNIFICANT CHANGE UP (ref 1.6–2.6)
MCHC RBC-ENTMCNC: 29.4 PG — SIGNIFICANT CHANGE UP (ref 27–34)
MCHC RBC-ENTMCNC: 31.7 GM/DL — LOW (ref 32–36)
MCV RBC AUTO: 92.7 FL — SIGNIFICANT CHANGE UP (ref 80–100)
PHOSPHATE SERPL-MCNC: 4.3 MG/DL — SIGNIFICANT CHANGE UP (ref 2.4–4.7)
PLATELET # BLD AUTO: 120 K/UL — LOW (ref 150–400)
POTASSIUM SERPL-MCNC: 3.4 MMOL/L — LOW (ref 3.5–5.3)
POTASSIUM SERPL-SCNC: 3.4 MMOL/L — LOW (ref 3.5–5.3)
PROT SERPL-MCNC: 5.8 G/DL — LOW (ref 6.6–8.7)
RBC # BLD: 2.62 M/UL — LOW (ref 4.2–5.8)
RBC # FLD: 13.7 % — SIGNIFICANT CHANGE UP (ref 10.3–14.5)
SODIUM SERPL-SCNC: 141 MMOL/L — SIGNIFICANT CHANGE UP (ref 135–145)
WBC # BLD: 5.89 K/UL — SIGNIFICANT CHANGE UP (ref 3.8–10.5)
WBC # FLD AUTO: 5.89 K/UL — SIGNIFICANT CHANGE UP (ref 3.8–10.5)

## 2024-04-16 PROCEDURE — 99254 IP/OBS CNSLTJ NEW/EST MOD 60: CPT

## 2024-04-16 PROCEDURE — 99233 SBSQ HOSP IP/OBS HIGH 50: CPT

## 2024-04-16 PROCEDURE — 99232 SBSQ HOSP IP/OBS MODERATE 35: CPT

## 2024-04-16 RX ORDER — ZOLPIDEM TARTRATE 10 MG/1
5 TABLET ORAL AT BEDTIME
Refills: 0 | Status: DISCONTINUED | OUTPATIENT
Start: 2024-04-16 | End: 2024-04-17

## 2024-04-16 RX ORDER — POTASSIUM CHLORIDE 20 MEQ
20 PACKET (EA) ORAL
Refills: 0 | Status: COMPLETED | OUTPATIENT
Start: 2024-04-16 | End: 2024-04-16

## 2024-04-16 RX ORDER — MAGNESIUM SULFATE 500 MG/ML
1 VIAL (ML) INJECTION ONCE
Refills: 0 | Status: COMPLETED | OUTPATIENT
Start: 2024-04-16 | End: 2024-04-16

## 2024-04-16 RX ORDER — TUBERCULIN PURIFIED PROTEIN DERIVATIVE 5 [IU]/.1ML
5 INJECTION, SOLUTION INTRADERMAL ONCE
Refills: 0 | Status: COMPLETED | OUTPATIENT
Start: 2024-04-16 | End: 2024-04-16

## 2024-04-16 RX ADMIN — Medication 20 MILLIEQUIVALENT(S): at 14:05

## 2024-04-16 RX ADMIN — Medication 1 TABLET(S): at 11:27

## 2024-04-16 RX ADMIN — IRON SUCROSE 200 MILLIGRAM(S): 20 INJECTION, SOLUTION INTRAVENOUS at 14:05

## 2024-04-16 RX ADMIN — TUBERCULIN PURIFIED PROTEIN DERIVATIVE 5 UNIT(S): 5 INJECTION, SOLUTION INTRADERMAL at 22:31

## 2024-04-16 RX ADMIN — Medication 0.25 MILLIGRAM(S): at 05:27

## 2024-04-16 RX ADMIN — Medication 3 MILLIGRAM(S): at 01:42

## 2024-04-16 RX ADMIN — Medication 20 MILLIEQUIVALENT(S): at 11:27

## 2024-04-16 RX ADMIN — SODIUM CHLORIDE 100 MILLILITER(S): 9 INJECTION, SOLUTION INTRAVENOUS at 00:33

## 2024-04-16 RX ADMIN — PANTOPRAZOLE SODIUM 40 MILLIGRAM(S): 20 TABLET, DELAYED RELEASE ORAL at 05:28

## 2024-04-16 RX ADMIN — ISOSORBIDE MONONITRATE 30 MILLIGRAM(S): 60 TABLET, EXTENDED RELEASE ORAL at 14:16

## 2024-04-16 RX ADMIN — ZOLPIDEM TARTRATE 5 MILLIGRAM(S): 10 TABLET ORAL at 22:28

## 2024-04-16 RX ADMIN — AMLODIPINE BESYLATE 10 MILLIGRAM(S): 2.5 TABLET ORAL at 05:28

## 2024-04-16 RX ADMIN — CARVEDILOL PHOSPHATE 6.25 MILLIGRAM(S): 80 CAPSULE, EXTENDED RELEASE ORAL at 05:28

## 2024-04-16 RX ADMIN — LEVETIRACETAM 500 MILLIGRAM(S): 250 TABLET, FILM COATED ORAL at 05:28

## 2024-04-16 RX ADMIN — Medication 0.1 MILLIGRAM(S): at 05:28

## 2024-04-16 RX ADMIN — TAMSULOSIN HYDROCHLORIDE 0.4 MILLIGRAM(S): 0.4 CAPSULE ORAL at 22:28

## 2024-04-16 RX ADMIN — Medication 50 MILLIGRAM(S): at 05:28

## 2024-04-16 RX ADMIN — Medication 25 GRAM(S): at 11:30

## 2024-04-16 RX ADMIN — ATORVASTATIN CALCIUM 40 MILLIGRAM(S): 80 TABLET, FILM COATED ORAL at 22:28

## 2024-04-16 NOTE — CONSULT NOTE ADULT - ASSESSMENT
Patient is a 63 year-old male admitted for SANTOS. Seen in consultation for TDC. Per nephrology, no need for urgent HD but will resume long term HD. Will tentatively add TDC placement for Friday 4/18.    Please call extension [phone] with any questions, concerns or issues regarding above.  Patient is a 63 year-old male admitted for SANTOS. Seen in consultation for TDC. Per nephrology, no need for urgent HD but will resume long term HD. Will tentatively add TDC placement for Friday 4/19.    Please call extension [phone] with any questions, concerns or issues regarding above.

## 2024-04-16 NOTE — CONSULT NOTE ADULT - ASSESSMENT
62-year-old male with h/o HTN, aortic dissection s/p repair, CAD and VHD with prior CABG/bio-AVR/bio-MVR (2020), bowel ischemia treated with resection, ESRD on dialysis (pt no longer on dialysis), AVMs s/p cauterization, atrial fibrillation/flutter with previous RFA, PVCs, sudural/subarachnoid hemorrhage, restarted Eliquis 8/2023, MUSTAPHA performed 9/27/23 with +MV vegetation and source thought to be AV graft which was removed 10/1/23, treated with 6 week A/B course, repeat MUSTAPHA in December 2023 negative for any vegetations. AV fistula thought to be responsible for the infection which has been removed.  He was schedule for BLAINE occlusion/Watchman implant on 4/12 but was noted to have SANTOS (Cr 5; baseline ~2) and anemia (Hg 7.1). Last dose of Eliquis 4/12. General cardiology consulted today due to NSVT in the setting of anemia and CKD. He currently denies chest pain, back pain, headache, dizziness, SOB, ZUNIGA, diaphoresis, syncope or N/V.

## 2024-04-16 NOTE — CONSULT NOTE ADULT - PROBLEM SELECTOR RECOMMENDATION 9
- case reviewed and tentatively planned for Friday 4/18  - please place IR procedure order for tunneled dialysis catheter under Dr. Winkler  - OSVALDOO on 4/17 at 11:59pm  - d/w primary team - case reviewed and tentatively planned for Friday 4/19  - please place IR procedure order for tunneled dialysis catheter under Dr. Winkler  - OSVALDOO on 4/18 at 11:59pm  - d/w primary team

## 2024-04-16 NOTE — CONSULT NOTE ADULT - CONSULT REASON
HD access
SANTOS on CKD
Scheduled for LAAO/ Procedure cancelled due to anemia and SANTOS
Anemia
tunneled dialysis catheter
NSVT

## 2024-04-16 NOTE — PROGRESS NOTE ADULT - ASSESSMENT
63year-old male with h/o seizure, HTN, aortic dissection s/p repair, CAD and VHD with prior CABG/bio-AVR/bio-MVR, bowel ischemia treated with resection, ESRD was on dialysis in past (pt no longer on dialysis), AVMs s/p cauterization, atrial fibrillation/flutter with previous RFA, PVCs, subdural/subarachnoid hemorrhage, restarted Eliquis 8/2023, MUSTAPHA performed 9/27/23 with +MV vegetation and source thought to be AV graft which was removed 10/1/23, treated with 6 week A/B course, repeat MUSTAPHA in December 2023 negative for any vegetations. AV fistula thought to be responsible for the infection which has been removed.  He was schedule for BLAINE occlusion/Watchman implant today and was noted to have SANTOS (Cr 5; baseline ~2) and anemia (Hg 7.1)He also reports chronic loose stools. No blood seen in stool/emesis. Pt is voiding urine w/o any issues.    Acute on CKD st 3  -hx of prior HD    baseline 1.8-2  renal following  may need to resume HD    NSVT:  echo, maintain K>4, mg>2    [facility] cardio consulted      Hypertensive urgency  -Telemetry  -sbp was 244  -clonidin,coreg,amlodipine. isosorbide   -CTH stable    Anemia  ? Anemia chronic disease  -Repeat hb 7.9. last hb DEC 2023 11.1  -iron panel--> venofer  -ppi bid   -hold eliquis  -dvt ppx scd's  s/p 1 U PRBC   gi following : colonoscopy pending renal function stabilization      HX afib/flutter  -plan for BLAINE occlusion/Watchman implant which was  cancelled  -EP following    HX aortic dissection s/p repair, CAD and VHD with prior CABG/bio-AVR/bio-MVR  -Continue home meds  -hold eliquis  hold lasix    hx  seizure  -on keppra at home  -per pharmacy adjust to renal dose    dvt ppx scd;s    Plan of care dw pt  dw nephrology

## 2024-04-16 NOTE — CONSULT NOTE ADULT - PROBLEM SELECTOR RECOMMENDATION 3
.  - anemic but no source of bleed identified  - in the setting of ESRD, no longer on HD, fistula removed due to infection  - check stool for OB  -  hx of Afib, Eliquis on hold due to anemia. Will need source of identified prior to re-instating. No heparin GTT for now, discussed with EP. Resume Eliquis if no bleeding and if no procedures planned  - optimize H/H  - appreciate nephrology input

## 2024-04-16 NOTE — CONSULT NOTE ADULT - SUBJECTIVE AND OBJECTIVE BOX
Vascular Attending:        HPI:  62-year-old male with h/o HTN, aortic dissection s/p repair, CAD and VHD with prior CABG/bio-AVR/bio-MVR, bowel ischemia treated with resection, ESRD on dialysis (pt no longer on dialysis), AVMs s/p cauterization, atrial fibrillation/flutter with previous RFA, PVCs, sudural/subarachnoid hemorrhage, restarted Eliquis 8/2023, MUSTAPHA performed 9/27/23 with +MV vegetation and source thought to be AV graft which was removed 10/1/23, treated with 6 week A/B course, repeat MUSTAPHA in December 2023 negative for any vegetations. AV fistula thought to be responsible for the infection which has been removed.  He was schedule for BLAINE occlusion/Watchman implant today nd was noted to have SANTOS (Cr 5; baseline ~2) and anemia (Hg 7.1). He denies any bleeding issues or dark stools. Denies HA, fevers, chills, CP, palp, or SOB. He does report some recent balance issues, vertigo and episodes of N/V last night. He also reports chronic loose stools. No blood seen in stool/emesis. Pt is voiding urine w/o any issues. Pt states last emesis last night after he drunk plenty of Gatorade. Pt also drunk 2 beers. No vomiting since came to ed,ambulate to bathroom w/o any dizziness. Pt states he has on/off dizziness last one month.Last dose of eliquis last night. seen by  last week. Pt is found to have sbp 244 at ed.       PAST MEDICAL & SURGICAL HISTORY:  CHF (congestive heart failure)  CVA (cerebral vascular accident)  Seizures  last seizure 10 years ago  HTN (hypertension)  Hyperlipemia  Atrial fibrillation  Former smoker  History of cocaine use  H/O aortic dissection  s/p repair (2010), surgery complicated by bowel ischemia s/p bowel resection and ostomy with reversal  H/O aortic valve insufficiency  Mitral regurgitation  GIB (gastrointestinal bleeding)  CAD (coronary artery disease)  s/p PCI 1998  and CABG x 2 11/2020  H/O colectomy  S/P AVR (aortic valve replacement)  (t)AVR 11/2020 Dr Butler  S/P MVR (mitral valve replacement)  (t)MVR 11/2020 Dr Butler  H/O aortic dissection  Type A; emergent repair 2010  AV fistula    SOCIAL HISTORY:  Former smoker  Former ETOH, pt states he was drinking heavily in past currently drink 1-2 beers occasionally   Prior cocaine use -2017 last use      FAMILY HISTORY:  FH: hypertension    Family history of cardiac disorder (Mother)       (12 Apr 2024 17:55)      PAST MEDICAL & SURGICAL HISTORY:  CHF (congestive heart failure)      CVA (cerebral vascular accident)      Seizures  last seizure 10 years ago      HTN (hypertension)      Hyperlipemia      COVID-19 october 2020      Atrial fibrillation      Former smoker      History of cocaine use  remote hx, currently sober      H/O aortic dissection  s/p repair (2010), surgery complicated by bowel ischemia s/p bowel resection and ostomy with reversal      H/O aortic valve insufficiency      Mitral regurgitation      GIB (gastrointestinal bleeding)      CAD (coronary artery disease)  s/p PCI 1998  and CABG x 2 11/2020      Chronic atrial fibrillation      H/O colectomy      Status post double vessel coronary artery bypass  11/2020 Dr Butler      S/P AVR (aortic valve replacement)  (t)AVR 11/2020 Dr Butler      S/P MVR (mitral valve replacement)  (t)MVR 11/2020 Dr Butler      H/O aortic dissection  Type A; emergent repair 2010      AV fistula  MICHAEL          REVIEW OF SYSTEMS      General:	    Skin/Breast:  	  Ophthalmologic:  	  ENMT:	    Respiratory and Thorax:  	  Cardiovascular:	    Gastrointestinal:	    Genitourinary:	    Musculoskeletal:	    Neurological:	    Psychiatric:	    Hematology/Lymphatics:	    Endocrine:	    Allergic/Immunologic:	    MEDICATIONS  (STANDING):  amLODIPine   Tablet 10 milliGRAM(s) Oral daily  atorvastatin 40 milliGRAM(s) Oral at bedtime  carvedilol 6.25 milliGRAM(s) Oral every 12 hours  cloNIDine 0.1 milliGRAM(s) Oral two times a day  hydrALAZINE 50 milliGRAM(s) Oral every 12 hours  iron sucrose Injectable 200 milliGRAM(s) IV Push every 24 hours  isosorbide   mononitrate ER Tablet (IMDUR) 30 milliGRAM(s) Oral daily  levETIRAcetam 500 milliGRAM(s) Oral two times a day  Nephro-jeanie 1 Tablet(s) Oral daily  pantoprazole  Injectable 40 milliGRAM(s) IV Push every 12 hours  potassium chloride    Tablet ER 20 milliEquivalent(s) Oral every 2 hours  sodium chloride 0.45% 1000 milliLiter(s) (100 mL/Hr) IV Continuous <Continuous>  tamsulosin 0.4 milliGRAM(s) Oral at bedtime    MEDICATIONS  (PRN):  acetaminophen     Tablet .. 650 milliGRAM(s) Oral every 6 hours PRN Temp greater or equal to 38C (100.4F), Mild Pain (1 - 3)  ALPRAZolam 0.25 milliGRAM(s) Oral three times a day PRN anxiety  aluminum hydroxide/magnesium hydroxide/simethicone Suspension 30 milliLiter(s) Oral every 4 hours PRN Dyspepsia  cyclobenzaprine 5 milliGRAM(s) Oral three times a day PRN Muscle Spasm  hydrALAZINE Injectable 10 milliGRAM(s) IV Push every 4 hours PRN sbp>159  labetalol Injectable 10 milliGRAM(s) IV Push every 6 hours PRN Systolic blood pressure >160  melatonin 3 milliGRAM(s) Oral at bedtime PRN Insomnia  ondansetron Injectable 4 milliGRAM(s) IV Push every 8 hours PRN Nausea and/or Vomiting      Allergies    penicillin (Other)    Intolerances        SOCIAL HISTORY:      Vital Signs Last 24 Hrs  T(C): 36.4 (16 Apr 2024 08:20), Max: 36.5 (16 Apr 2024 00:00)  T(F): 97.5 (16 Apr 2024 08:20), Max: 97.7 (16 Apr 2024 00:00)  HR: 53 (16 Apr 2024 08:20) (53 - 61)  BP: 134/74 (16 Apr 2024 08:20) (123/68 - 197/92)  BP(mean): 127 (16 Apr 2024 05:00) (127 - 127)  RR: 17 (16 Apr 2024 08:20) (17 - 18)  SpO2: 97% (16 Apr 2024 08:20) (93% - 98%)    Parameters below as of 16 Apr 2024 08:20  Patient On (Oxygen Delivery Method): room air        PHYSICAL EXAM:      Constitutional:    Eyes:    ENMT:    Neck:    Breasts:    Back:    Respiratory:    Cardiovascular:    Gastrointestinal:    Genitourinary:    Rectal:    Extremities:    Vascular:    Neurological:    Skin:    Lymph Nodes:    Musculoskeletal:    Psychiatric:      Pulses:   Right:                                                                          Left:  FEM [ ]2+ [ ]1+ [ ]doppler                                             FEM [ ]2+ [ ]1+ [ ]doppler    POP [ ]2+ [ ]1+ [ ]doppler                                             POP [ ]2+ [ ]1+ [ ]doppler    DP [ ]2+ [ ]1+ [ ]doppler                                                DP [ ]2+ [ ]1+ [ ]doppler  PT[ ]2+ [ ]1+ [ ]doppler                                                  PT [ ]2+ [ ]1+ [ ]doppler      LABS:                        7.7    5.89  )-----------( 120      ( 16 Apr 2024 04:47 )             24.3     04-16    141  |  106  |  62.2<H>  ----------------------------<  94  3.4<L>   |  19.0<L>  |  6.27<H>    Ca    7.7<L>      16 Apr 2024 04:47  Phos  4.3     04-16  Mg     1.9     04-16    TPro  5.8<L>  /  Alb  3.2<L>  /  TBili  0.3<L>  /  DBili  x   /  AST  6   /  ALT  9   /  AlkPhos  65  04-16      Urinalysis Basic - ( 16 Apr 2024 04:47 )    Color: x / Appearance: x / SG: x / pH: x  Gluc: 94 mg/dL / Ketone: x  / Bili: x / Urobili: x   Blood: x / Protein: x / Nitrite: x   Leuk Esterase: x / RBC: x / WBC x   Sq Epi: x / Non Sq Epi: x / Bacteria: x        RADIOLOGY & ADDITIONAL STUDIES    Impression and Plan: Vascular Attending:  gloria PEPE    Vascular Surgery HPI:  Admission HPI below reviewed  Patient known to our service  history of prior HD access related issues, related to infection   Renal status was improved and HD was no longer needed, but currently there's need to resume HD due to recurrent renal compromise   HD access requested       HPI:  62-year-old male with h/o HTN, aortic dissection s/p repair, CAD and VHD with prior CABG/bio-AVR/bio-MVR, bowel ischemia treated with resection, ESRD on dialysis (pt no longer on dialysis), AVMs s/p cauterization, atrial fibrillation/flutter with previous RFA, PVCs, sudural/subarachnoid hemorrhage, restarted Eliquis 8/2023, MUSTAPHA performed 9/27/23 with +MV vegetation and source thought to be AV graft which was removed 10/1/23, treated with 6 week A/B course, repeat MUSTAPHA in December 2023 negative for any vegetations. AV fistula thought to be responsible for the infection which has been removed.  He was schedule for BLAINE occlusion/Watchman implant today nd was noted to have SANTOS (Cr 5; baseline ~2) and anemia (Hg 7.1). He denies any bleeding issues or dark stools. Denies HA, fevers, chills, CP, palp, or SOB. He does report some recent balance issues, vertigo and episodes of N/V last night. He also reports chronic loose stools. No blood seen in stool/emesis. Pt is voiding urine w/o any issues. Pt states last emesis last night after he drunk plenty of Gatorade. Pt also drunk 2 beers. No vomiting since came to ed,ambulate to bathroom w/o any dizziness. Pt states he has on/off dizziness last one month.Last dose of eliquis last night. seen by  last week. Pt is found to have sbp 244 at ed.       PAST MEDICAL & SURGICAL HISTORY:  CHF (congestive heart failure)  CVA (cerebral vascular accident)  Seizures  last seizure 10 years ago  HTN (hypertension)  Hyperlipemia  Atrial fibrillation  Former smoker  History of cocaine use  H/O aortic dissection  s/p repair (2010), surgery complicated by bowel ischemia s/p bowel resection and ostomy with reversal  H/O aortic valve insufficiency  Mitral regurgitation  GIB (gastrointestinal bleeding)  CAD (coronary artery disease)  s/p PCI 1998  and CABG x 2 11/2020  H/O colectomy  S/P AVR (aortic valve replacement)  (t)AVR 11/2020 Dr Butler  S/P MVR (mitral valve replacement)  (t)MVR 11/2020 Dr Butler  H/O aortic dissection  Type A; emergent repair 2010  AV fistula    SOCIAL HISTORY:  Former smoker  Former ETOH, pt states he was drinking heavily in past currently drink 1-2 beers occasionally   Prior cocaine use -2017 last use      FAMILY HISTORY:  FH: hypertension    Family history of cardiac disorder (Mother)       (12 Apr 2024 17:55)      PAST MEDICAL & SURGICAL HISTORY:  CHF (congestive heart failure)      CVA (cerebral vascular accident)      Seizures  last seizure 10 years ago      HTN (hypertension)      Hyperlipemia      COVID-19 october 2020      Atrial fibrillation      Former smoker      History of cocaine use  remote hx, currently sober      H/O aortic dissection  s/p repair (2010), surgery complicated by bowel ischemia s/p bowel resection and ostomy with reversal      H/O aortic valve insufficiency      Mitral regurgitation      GIB (gastrointestinal bleeding)      CAD (coronary artery disease)  s/p PCI 1998  and CABG x 2 11/2020      Chronic atrial fibrillation      H/O colectomy      Status post double vessel coronary artery bypass  11/2020 Dr Butler      S/P AVR (aortic valve replacement)  (t)AVR 11/2020 Dr Butler      S/P MVR (mitral valve replacement)  (t)MVR 11/2020 Dr Butler      H/O aortic dissection  Type A; emergent repair 2010      AV fistula  ANDRÉSE          REVIEW OF SYSTEMS:  see HPI       General:	    Skin/Breast:  	  Ophthalmologic:  	  ENMT:	    Respiratory and Thorax:  	  Cardiovascular:	    Gastrointestinal:	    Genitourinary:	    Musculoskeletal:	    Neurological:	    Psychiatric:	    Hematology/Lymphatics:	    Endocrine:	    Allergic/Immunologic:	    MEDICATIONS  (STANDING):  amLODIPine   Tablet 10 milliGRAM(s) Oral daily  atorvastatin 40 milliGRAM(s) Oral at bedtime  carvedilol 6.25 milliGRAM(s) Oral every 12 hours  cloNIDine 0.1 milliGRAM(s) Oral two times a day  hydrALAZINE 50 milliGRAM(s) Oral every 12 hours  iron sucrose Injectable 200 milliGRAM(s) IV Push every 24 hours  isosorbide   mononitrate ER Tablet (IMDUR) 30 milliGRAM(s) Oral daily  levETIRAcetam 500 milliGRAM(s) Oral two times a day  Nephro-jeanie 1 Tablet(s) Oral daily  pantoprazole  Injectable 40 milliGRAM(s) IV Push every 12 hours  potassium chloride    Tablet ER 20 milliEquivalent(s) Oral every 2 hours  sodium chloride 0.45% 1000 milliLiter(s) (100 mL/Hr) IV Continuous <Continuous>  tamsulosin 0.4 milliGRAM(s) Oral at bedtime    MEDICATIONS  (PRN):  acetaminophen     Tablet .. 650 milliGRAM(s) Oral every 6 hours PRN Temp greater or equal to 38C (100.4F), Mild Pain (1 - 3)  ALPRAZolam 0.25 milliGRAM(s) Oral three times a day PRN anxiety  aluminum hydroxide/magnesium hydroxide/simethicone Suspension 30 milliLiter(s) Oral every 4 hours PRN Dyspepsia  cyclobenzaprine 5 milliGRAM(s) Oral three times a day PRN Muscle Spasm  hydrALAZINE Injectable 10 milliGRAM(s) IV Push every 4 hours PRN sbp>159  labetalol Injectable 10 milliGRAM(s) IV Push every 6 hours PRN Systolic blood pressure >160  melatonin 3 milliGRAM(s) Oral at bedtime PRN Insomnia  ondansetron Injectable 4 milliGRAM(s) IV Push every 8 hours PRN Nausea and/or Vomiting      Allergies    penicillin (Other)    Intolerances        SOCIAL HISTORY: non contributory       Vital Signs Last 24 Hrs  T(C): 36.4 (16 Apr 2024 08:20), Max: 36.5 (16 Apr 2024 00:00)  T(F): 97.5 (16 Apr 2024 08:20), Max: 97.7 (16 Apr 2024 00:00)  HR: 53 (16 Apr 2024 08:20) (53 - 61)  BP: 134/74 (16 Apr 2024 08:20) (123/68 - 197/92)  BP(mean): 127 (16 Apr 2024 05:00) (127 - 127)  RR: 17 (16 Apr 2024 08:20) (17 - 18)  SpO2: 97% (16 Apr 2024 08:20) (93% - 98%)    Parameters below as of 16 Apr 2024 08:20  Patient On (Oxygen Delivery Method): room air        PHYSICAL EXAM:      Constitutional: no distress, alert and oriented     Eyes: no scleral icterus     ENMT: atraumatic     Neck: no access catheters     Respiratory: non labored     Cardiovascular: normal rate    Gastrointestinal: no gross distention     Genitourinary: not examined     Rectal: not examined     Extremities: left upper ext forearm with healed surgical sites, no Thrill     Vascular: no noted distal perfusion deficits     Neurological: no gross neurological deficits     Skin: scattered ecchymosis to the right upper ext     Psychiatric: good affect             LABS:                        7.7    5.89  )-----------( 120      ( 16 Apr 2024 04:47 )             24.3     04-16    141  |  106  |  62.2<H>  ----------------------------<  94  3.4<L>   |  19.0<L>  |  6.27<H>    Ca    7.7<L>      16 Apr 2024 04:47  Phos  4.3     04-16  Mg     1.9     04-16    TPro  5.8<L>  /  Alb  3.2<L>  /  TBili  0.3<L>  /  DBili  x   /  AST  6   /  ALT  9   /  AlkPhos  65  04-16      Urinalysis Basic - ( 16 Apr 2024 04:47 )    Color: x / Appearance: x / SG: x / pH: x  Gluc: 94 mg/dL / Ketone: x  / Bili: x / Urobili: x   Blood: x / Protein: x / Nitrite: x   Leuk Esterase: x / RBC: x / WBC x   Sq Epi: x / Non Sq Epi: x / Bacteria: x        RADIOLOGY & ADDITIONAL STUDIES    Impression and Plan:    Renal compromise  need for resumption of HD    -- Discussed with IR for permacath placement   - will reobtain vein mapping to reexplore options for creation of AVF/AVG

## 2024-04-16 NOTE — CONSULT NOTE ADULT - SUBJECTIVE AND OBJECTIVE BOX
HPI:  62-year-old male with h/o HTN, aortic dissection s/p repair, CAD and VHD with prior CABG/bio-AVR/bio-MVR, bowel ischemia treated with resection, ESRD on dialysis (pt no longer on dialysis), AVMs s/p cauterization, atrial fibrillation/flutter with previous RFA, PVCs, sudural/subarachnoid hemorrhage, restarted Eliquis 8/2023, MUSTAPHA performed 9/27/23 with +MV vegetation and source thought to be AV graft which was removed 10/1/23, treated with 6 week A/B course, repeat MUSTAPHA in December 2023 negative for any vegetations. AV fistula thought to be responsible for the infection which has been removed.  He was schedule for BLAINE occlusion/Watchman implant today nd was noted to have SANTOS (Cr 5; baseline ~2) and anemia (Hg 7.1). He denies any bleeding issues or dark stools. Denies HA, fevers, chills, CP, palp, or SOB. He does report some recent balance issues, vertigo and episodes of N/V last night. He also reports chronic loose stools. No blood seen in stool/emesis. Pt is voiding urine w/o any issues. Pt states last emesis last night after he drunk plenty of Gatorade. Pt also drunk 2 beers. No vomiting since came to ed,ambulate to bathroom w/o any dizziness. Pt states he has on/off dizziness last one month.Last dose of eliquis last night. seen by  last week. Pt is found to have sbp 244 at ed.    Interval history: IR consulted for tunneled dialysis catheter    ============================================================================  Medications:  Home Medications:  amLODIPine 10 mg oral tablet: 1 tab(s) orally once a day (12 Apr 2024 17:53)  apixaban 5 mg oral tablet: 1 tab(s) orally every 12 hours (12 Apr 2024 17:53)  atorvastatin 40 mg oral tablet: 1 tab(s) orally once a day (at bedtime) (12 Apr 2024 17:53)  carvedilol 6.25 mg oral tablet: 1 tab(s) orally every 12 hours (12 Apr 2024 17:53)  cloNIDine 0.1 mg oral tablet: 1 tab(s) orally 2 times a day (12 Apr 2024 18:49)  isosorbide mononitrate 30 mg oral tablet, extended release: 1 tab(s) orally once a day (12 Apr 2024 18:48)  [place] 2.5 mg-0.025 mg oral tablet: 2 tab(s) orally 4 times a day as needed (12 Apr 2024 17:53)  tamsulosin 0.4 mg oral capsule: 1 cap(s) orally once a day (at bedtime) (12 Apr 2024 17:53)  torsemide 20 mg oral tablet: 1 tab(s) orally once a day (12 Apr 2024 17:53)    MEDICATIONS  (STANDING):  amLODIPine   Tablet 10 milliGRAM(s) Oral daily  atorvastatin 40 milliGRAM(s) Oral at bedtime  carvedilol 6.25 milliGRAM(s) Oral every 12 hours  cloNIDine 0.1 milliGRAM(s) Oral two times a day  hydrALAZINE 50 milliGRAM(s) Oral every 12 hours  isosorbide   mononitrate ER Tablet (IMDUR) 30 milliGRAM(s) Oral daily  levETIRAcetam 500 milliGRAM(s) Oral two times a day  Nephro-jaenie 1 Tablet(s) Oral daily  pantoprazole  Injectable 40 milliGRAM(s) IV Push every 12 hours  PPD  5 Tuberculin Unit(s) Injectable 5 Unit(s) IntraDermal once  sodium chloride 0.45% 1000 milliLiter(s) (100 mL/Hr) IV Continuous <Continuous>  tamsulosin 0.4 milliGRAM(s) Oral at bedtime    MEDICATIONS  (PRN):  acetaminophen     Tablet .. 650 milliGRAM(s) Oral every 6 hours PRN Temp greater or equal to 38C (100.4F), Mild Pain (1 - 3)  ALPRAZolam 0.25 milliGRAM(s) Oral three times a day PRN anxiety  aluminum hydroxide/magnesium hydroxide/simethicone Suspension 30 milliLiter(s) Oral every 4 hours PRN Dyspepsia  cyclobenzaprine 5 milliGRAM(s) Oral three times a day PRN Muscle Spasm  hydrALAZINE Injectable 10 milliGRAM(s) IV Push every 4 hours PRN sbp>159  labetalol Injectable 10 milliGRAM(s) IV Push every 6 hours PRN Systolic blood pressure >160  melatonin 3 milliGRAM(s) Oral at bedtime PRN Insomnia  ondansetron Injectable 4 milliGRAM(s) IV Push every 8 hours PRN Nausea and/or Vomiting      Allergies:   penicillin (Other)    ============================================================================  PAST MEDICAL & SURGICAL HISTORY:  CHF (congestive heart failure)      CVA (cerebral vascular accident)      Seizures  last seizure 10 years ago      HTN (hypertension)      Hyperlipemia      COVID-19 october 2020      Atrial fibrillation      Former smoker      History of cocaine use  remote hx, currently sober      H/O aortic dissection  s/p repair (2010), surgery complicated by bowel ischemia s/p bowel resection and ostomy with reversal      H/O aortic valve insufficiency      Mitral regurgitation      GIB (gastrointestinal bleeding)      CAD (coronary artery disease)  s/p PCI 1998  and CABG x 2 11/2020      Chronic atrial fibrillation      H/O colectomy      Status post double vessel coronary artery bypass  11/2020 Dr Butler      S/P AVR (aortic valve replacement)  (t)AVR 11/2020 Dr Butler      S/P MVR (mitral valve replacement)  (t)MVR 11/2020 Dr Butler      H/O aortic dissection  Type A; emergent repair 2010      AV fistula  LUE          FAMILY HISTORY:  FH: hypertension    Family history of cardiac disorder (Mother)  mother        Social History:      ============================================================================  Vitals:  Vital Signs Last 24 Hrs  T(C): 36.4 (16 Apr 2024 08:20), Max: 36.5 (16 Apr 2024 00:00)  T(F): 97.5 (16 Apr 2024 08:20), Max: 97.7 (16 Apr 2024 00:00)  HR: 53 (16 Apr 2024 08:20) (53 - 61)  BP: 134/74 (16 Apr 2024 08:20) (123/68 - 197/92)  BP(mean): 127 (16 Apr 2024 05:00) (127 - 127)  RR: 17 (16 Apr 2024 08:20) (17 - 18)  SpO2: 97% (16 Apr 2024 08:20) (93% - 98%)    Parameters below as of 16 Apr 2024 08:20  Patient On (Oxygen Delivery Method): room air    Labs:                        7.7    5.89  )-----------( 120      ( 16 Apr 2024 04:47 )             24.3     04-16    141  |  106  |  62.2<H>  ----------------------------<  94  3.4<L>   |  19.0<L>  |  6.27<H>    Ca    7.7<L>      16 Apr 2024 04:47  Phos  4.3     04-16  Mg     1.9     04-16    TPro  5.8<L>  /  Alb  3.2<L>  /  TBili  0.3<L>  /  DBili  x   /  AST  6   /  ALT  9   /  AlkPhos  65  04-16        Imaging:   Pertinent Imaging Reviewed.    ============================================================================

## 2024-04-16 NOTE — PROGRESS NOTE ADULT - ASSESSMENT
62 yo M w h/o HTN, aortic dissection s/p repair, CAD and VHD with prior CABG/bio-AVR/bio-MVR, bowel ischemia treated with resection, ESRD on dialysis (pt no longer on dialysis), AVMs s/p cauterization, atrial fibrillation/flutter with previous RFA, PVCs, sudural/subarachnoid hemorrhage, restarted Eliquis 8/2023, MUSTAPHA performed 9/27/23 with +MV vegetation and source thought to be AV graft which was removed 10/1/23, treated with 6 week A/B course, repeat MUSTAPHA in December 2023 negative for any vegetations. AV fistula thought to be responsible for the infection which has been removed.  He was schedule for BLAINE occlusion/Watchman implant today nd was noted to have SANTOS (Cr 5; baseline ~2) and anemia (Hg 7.1). He denies any bleeding issues or dark stools.    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---> Needs follow up   MUSTAPHA 12/23 noted - LV function normal     SANTOS on CKD III  Serum Cr 1.7 - 1/2023  Rising Cr 4.7--> 5.4--> 5.7 --> 6.1 --> 6.2  Here because he was told out patient labs were abnormal  Admits to taking ADVIL PM every night --> possible interstitial nephritis   Takes Torsemide every day --> hold diuretics for now  Renal US noted no hydronephrosis  s/p trial of IVF at inc rate no improvement in renal function  I had long discussion w pt he agrees to start dialysis answered all questions  Will contact vascular for permcath placement - will arrange dialysis after pc placed  Consent for HD in chart    HTN  BP ok on current regimen    Anemia should check FOBT w NSAID use hx--> s/p transfusion   Will restart Retacrit 20 K units one dose given 4/15  Low iron stores on  IV Venofer    Monitor labs will follow   62 yo M w h/o HTN, aortic dissection s/p repair, CAD and VHD with prior CABG/bio-AVR/bio-MVR, bowel ischemia treated with resection, ESRD on dialysis (pt no longer on dialysis), AVMs s/p cauterization, atrial fibrillation/flutter with previous RFA, PVCs, sudural/subarachnoid hemorrhage, restarted Eliquis 8/2023, MUSTAPHA performed 9/27/23 with +MV vegetation and source thought to be AV graft which was removed 10/1/23, treated with 6 week A/B course, repeat MUSTAPHA in December 2023 negative for any vegetations. AV fistula thought to be responsible for the infection which has been removed.  He was schedule for BLAINE occlusion/Watchman implant today nd was noted to have SANTOS (Cr 5; baseline ~2) and anemia (Hg 7.1). He denies any bleeding issues or dark stools.    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---> Needs follow up   MUSTAPHA 12/23 noted - LV function normal     SANTOS on CKD III  Serum Cr 1.7 - 1/2023  Rising Cr 4.7--> 5.4--> 5.7 --> 6.1 --> 6.2  Here because he was told out patient labs were abnormal  Admits to taking ADVIL PM every night --> possible interstitial nephritis   Takes Torsemide every day --> hold diuretics for now  Renal US noted no hydronephrosis  s/p trial of IVF at inc rate no improvement in renal function  I had long discussion w pt he agrees to start dialysis answered all questions  Will contact vascular for permcath placement - will arrange dialysis after pc placed  Consent for HD in chart  SW assistance pt need HD chair in the community   Use to go to [facility] in [place] he lives near there would like to go back  Needs Hep panel and ppd    HTN  BP ok on current regimen    Anemia should check FOBT w NSAID use hx--> s/p transfusion   Will restart Retacrit 20 K units one dose given 4/15  Low iron stores on  IV Venofer    Monitor labs will follow

## 2024-04-16 NOTE — CONSULT NOTE ADULT - SUBJECTIVE AND OBJECTIVE BOX
[facility]                                              CARDIOLOGY AT [address], [place] [address]                                             Telephone: [phone]. Fax:[phone]                                                       CARDIOLOGY CONSULTATION NOTE                                                                                             History obtained by: Patient and medical record  Community Cardiologist: Jostin   obtained: Yes [  ] No [ x ]  Reason for Consultation:   Available out pt records reviewed: Yes [  ] No [  ]    Chief complaint:    Patient is a 63y old  Male who presents with a chief complaint of anemia,acute renal failure (16 Apr 2024 11:37)      HPI:  62-year-old male with h/o HTN, aortic dissection s/p repair, CAD and VHD with prior CABG/bio-AVR/bio-MVR (2020), bowel ischemia treated with resection, ESRD on dialysis (pt no longer on dialysis), AVMs s/p cauterization, atrial fibrillation/flutter with previous RFA, PVCs, sudural/subarachnoid hemorrhage, restarted Eliquis 8/2023, MUSTAPHA performed 9/27/23 with +MV vegetation and source thought to be AV graft which was removed 10/1/23, treated with 6 week A/B course, repeat MUSTAPHA in December 2023 negative for any vegetations. AV fistula thought to be responsible for the infection which has been removed.  He was schedule for BLAINE occlusion/Watchman implant on 4/12 but was noted to have SANTOS (Cr 5; baseline ~2) and anemia (Hg 7.1). Last dose of Eliquis 4/12. General cardiology consulted today due to NSVT in the setting of anemia and CKD. He currently denies chest pain, back pain, headache, dizziness, SOB, ZUNIGA, diaphoresis, syncope or N/V.         PAST MEDICAL & SURGICAL HISTORY:  CHF (congestive heart failure)  CVA (cerebral vascular accident)  Seizures  last seizure 10 years ago  HTN (hypertension)  Hyperlipemia  Atrial fibrillation  Former smoker  History of cocaine use  H/O aortic dissection  s/p repair (2010), surgery complicated by bowel ischemia s/p bowel resection and ostomy with reversal  H/O aortic valve insufficiency  Mitral regurgitation  GIB (gastrointestinal bleeding)  CAD (coronary artery disease)  s/p PCI 1998  and CABG x 2 11/2020  H/O colectomy  S/P AVR (aortic valve replacement)  (t)AVR 11/2020 Dr Butler  S/P MVR (mitral valve replacement)  (t)MVR 11/2020 Dr Butler  H/O aortic dissection  Type A; emergent repair 2010  AV fistula    SOCIAL HISTORY:  Former smoker  Former ETOH, pt states he was drinking heavily in past currently drink 1-2 beers occasionally   Prior cocaine use -2017 last use      FAMILY HISTORY:  FH: hypertension  Family history of cardiac disorder (Mother)    CARDIAC TESTING   ECHO:  < from: MUSTAPHA W or WO Ultrasound Enhancing Agent (12.12.23 @ 10:12) >  TRANSESOPHAGEAL ECHOCARDIOGRAM REPORT  ________________________________________________________________________________                                      _______       Pt. Name:       JONATHAN GIBSON Study Date:    12/12/2023  MRN:   [id number]            YOB: 1960  Accession #:    [id number]             Age:           63 years  Account#:       [id number]            Gender:        M  Heart Rate:     65 bpm                Height:        68.00 in (172.72 cm)  Rhythm:         sinus rhythm          Weight:        175.00 lb (79.38 kg)  Blood Pressure: 186/89 mmHg           BSA/BMI:       1.93 m² / 26.61 kg/m²  ________________________________________________________________________________________  Referring Physician:    [id number] Jaspal Whitaker  Interpreting Physician: Jaspal Whitaker  Primary Sonographer:    Ken Vazquez    CPT:               ECHO TRANSESOPH W/O CON - 84309.m  Indication(s):     Endocarditis, valve unspecified - I38  Procedure:         Transesophageal echocardiogram performed with 2D, M-mode and                     complete spectral and color flow Doppler.  Ordering Location: [facility]/S  Study Information: Image quality for this study is good.    _______________________________________________________________________________________     CONCLUSIONS:      1. Left ventricular systolic function is normal.   2. The left atrium is severely dilated.   3. A bioprosthetic valve replacement present in the aortic position. No prosthetic aortic stenosis. No intravalvular regurgitation No paravalvular regurgitation.   4. Bioprosthetic valve present. in the mitral position. Well seated prosthetic mitral valve with normal function with normal leaflet excursion. Transvalvular mitral gradients are normal. No prosthetic mitral stenosis. There is No intravalvular mitral regurgitation. Moderate paravalvular mitral (one jet) regurgitation, originating at 9 o'clock, directed centrally.   5. No pericardial effusion seen.   6. No echocardiographic evidenceof vegetations.   7. Compared to the transesophageal echocardiogram performed on 9/1/2023 Mitral valve vegetation is resolved.    < end of copied text >    STRESS:    CATH:     ELECTROPHYSIOLOGY:     PAST MEDICAL HISTORY  CHF (congestive heart failure)  CVA (cerebral vascular accident)  Chronic kidney disease  Seizures  HTN (hypertension)  Hyperlipemia  COVID-19  Atrial fibrillation  ESRD on dialysis  Former smoker  History of cocaine use  H/O aortic dissection  H/O aortic valve insufficiency  Mitral regurgitation  GIB (gastrointestinal bleeding)  CAD (coronary artery disease)  Chronic atrial fibrillation      PAST SURGICAL HISTORY  Aorta disorder  H/O colectomy  Status post double vessel coronary artery bypass  S/P AVR (aortic valve replacement)  S/P MVR (mitral valve replacement)  H/O aortic dissection  AV fistula        SOCIAL HISTORY:  Denies smoking/alcohol/drugs  CIGARETTES:     ALCOHOL:  DRUGS:    FAMILY HISTORY:  FH: hypertension    Family history of cardiac disorder (Mother)  mother    Family History of Cardiovascular Disease:  Yes [ x ] No [  ]  Coronary Artery Disease in first degree relative: Yes [x  ] No [  ]  Sudden Cardiac Death in First degree relative: Yes [  ] No [  ]    HOME MEDICATIONS:  amLODIPine 10 mg oral tablet: 1 tab(s) orally once a day (12 Apr 2024 17:53)  apixaban 5 mg oral tablet: 1 tab(s) orally every 12 hours (12 Apr 2024 17:53)  atorvastatin 40 mg oral tablet: 1 tab(s) orally once a day (at bedtime) (12 Apr 2024 17:53)  carvedilol 6.25 mg oral tablet: 1 tab(s) orally every 12 hours (12 Apr 2024 17:53)  cloNIDine 0.1 mg oral tablet: 1 tab(s) orally 2 times a day (12 Apr 2024 18:49)  isosorbide mononitrate 30 mg oral tablet, extended release: 1 tab(s) orally once a day (12 Apr 2024 18:48)  [place] 2.5 mg-0.025 mg oral tablet: 2 tab(s) orally 4 times a day as needed (12 Apr 2024 17:53)  tamsulosin 0.4 mg oral capsule: 1 cap(s) orally once a day (at bedtime) (12 Apr 2024 17:53)  torsemide 20 mg oral tablet: 1 tab(s) orally once a day (12 Apr 2024 17:53)      CURRENT CARDIAC MEDICATIONS:  amLODIPine   Tablet 10 milliGRAM(s) Oral daily  carvedilol 6.25 milliGRAM(s) Oral every 12 hours  cloNIDine 0.1 milliGRAM(s) Oral two times a day  hydrALAZINE 50 milliGRAM(s) Oral every 12 hours  hydrALAZINE Injectable 10 milliGRAM(s) IV Push every 4 hours PRN sbp>159  isosorbide   mononitrate ER Tablet (IMDUR) 30 milliGRAM(s) Oral daily  labetalol Injectable 10 milliGRAM(s) IV Push every 6 hours PRN Systolic blood pressure >160      CURRENT OTHER MEDICATIONS:  acetaminophen     Tablet .. 650 milliGRAM(s) Oral every 6 hours PRN Temp greater or equal to 38C (100.4F), Mild Pain (1 - 3)  ALPRAZolam 0.25 milliGRAM(s) Oral three times a day PRN anxiety  cyclobenzaprine 5 milliGRAM(s) Oral three times a day PRN Muscle Spasm  levETIRAcetam 500 milliGRAM(s) Oral two times a day  melatonin 3 milliGRAM(s) Oral at bedtime PRN Insomnia  ondansetron Injectable 4 milliGRAM(s) IV Push every 8 hours PRN Nausea and/or Vomiting  aluminum hydroxide/magnesium hydroxide/simethicone Suspension 30 milliLiter(s) Oral every 4 hours PRN Dyspepsia  pantoprazole  Injectable 40 milliGRAM(s) IV Push every 12 hours  atorvastatin 40 milliGRAM(s) Oral at bedtime  iron sucrose Injectable 200 milliGRAM(s) IV Push every 24 hours, Stop order after: 4 Days  Nephro-jeanie 1 Tablet(s) Oral daily  potassium chloride    Tablet ER 20 milliEquivalent(s) Oral every 2 hours, Stop order after: 2 Doses  sodium chloride 0.45% 1000 milliLiter(s) (100 mL/Hr) IV Continuous <Continuous>, Stop order after: 24 Hours  tamsulosin 0.4 milliGRAM(s) Oral at bedtime      ALLERGIES:   penicillin (Other)      REVIEW OF SYMPTOMS:   CONSTITUTIONAL: No fever, no chills, no weight loss, no weight gain, no fatigue   ENMT:  No vertigo; No sinus or throat pain  NECK: No pain or stiffness  CARDIOVASCULAR: No chest pain, no dyspnea, no syncope/presyncope, no palpitations, no dizziness, no Orthopnea, no Paroxsymal nocturnal dyspnea  RESPIRATORY: no Shortness of breath, no cough, no wheezing  : No dysuria, no hematuria   GI: No dark color stool, no nausea, no diarrhea, no constipation, no abdominal pain   NEURO: No headache, no slurred speech   MUSCULOSKELETAL: No joint pain or swelling; No muscle, back, or extremity pain  PSYCH: No agitation, no anxiety.    ALL OTHER REVIEW OF SYSTEMS ARE NEGATIVE.    VITAL SIGNS:  T(C): 36.4 (04-16-24 @ 08:20), Max: 36.5 (04-16-24 @ 00:00)  T(F): 97.5 (04-16-24 @ 08:20), Max: 97.7 (04-16-24 @ 00:00)  HR: 53 (04-16-24 @ 08:20) (53 - 61)  BP: 134/74 (04-16-24 @ 08:20) (123/68 - 197/92)  RR: 17 (04-16-24 @ 08:20) (17 - 18)  SpO2: 97% (04-16-24 @ 08:20) (93% - 98%)    INTAKE AND OUTPUT:     04-15 @ 07:01  -  04-16 @ 07:00  --------------------------------------------------------  IN: 3080 mL / OUT: 1000 mL / NET: 2080 mL    04-16 @ 07:01  -  04-16 @ 12:13  --------------------------------------------------------  IN: 400 mL / OUT: 200 mL / NET: 200 mL        PHYSICAL EXAM:  Constitutional: Comfortable . No acute distress.   HEENT: Atraumatic and normocephalic , neck is supple . no JVD. No carotid bruit.  CNS: A&Ox3. No focal deficits.   Respiratory: CTAB, unlabored   Cardiovascular: RRR normal s1 s2. No murmur. No rubs or gallop.  Gastrointestinal: Soft, non-tender. +Bowel sounds.   Extremities: 2+ Peripheral Pulses, No clubbing, cyanosis, or edema  Psychiatric: Calm . no agitation.   Skin: Warm and dry, no ulcers on extremities     LABS:                            7.7    5.89  )-----------( 120      ( 16 Apr 2024 04:47 )             24.3     04-16    141  |  106  |  62.2<H>  ----------------------------<  94  3.4<L>   |  19.0<L>  |  6.27<H>    Ca    7.7<L>      16 Apr 2024 04:47  Phos  4.3     04-16  Mg     1.9     04-16    TPro  5.8<L>  /  Alb  3.2<L>  /  TBili  0.3<L>  /  DBili  x   /  AST  6   /  ALT  9   /  AlkPhos  65  04-16      Urinalysis Basic - ( 16 Apr 2024 04:47 )    Color: x / Appearance: x / SG: x / pH: x  Gluc: 94 mg/dL / Ketone: x  / Bili: x / Urobili: x   Blood: x / Protein: x / Nitrite: x   Leuk Esterase: x / RBC: x / WBC x   Sq Epi: x / Non Sq Epi: x / Bacteria: x              INTERPRETATION OF TELEMETRY:     ECG: NSR   Prior ECG: Yes [ x ] No [  ]    RADIOLOGY & ADDITIONAL STUDIES:    X-ray:    CT scan:   MRI:   US:

## 2024-04-16 NOTE — CONSULT NOTE ADULT - PROVIDER SPECIALTY LIST ADULT
Discharge Summary - Katharine Combs 30 y o  female MRN: [id number]    Unit/Bed#: E5 -01 Encounter: [id number]      Pre-Operative Diagnosis: Pre-Op Diagnosis Codes:     * Morbid obesity ([facility] ) [E66 01]     * Obstructive sleep apnea [G47 33]     * HTN (hypertension) [I10]     * Polycystic ovarian syndrome [E28 2]     * [facility] ) [E11 9]    Post-Operative Diagnosis: Post-Op Diagnosis Codes:     * Morbid obesity ([facility] ) [E66 01]     * Obstructive sleep apnea [G47 33]     * HTN (hypertension) [I10]     * Polycystic ovarian syndrome [E28 2]     * Diabetes mellitus ([facility] ) [E11 9]    Procedures Performed:  Procedure(s):  GASTRECTOMY  SLEEVE W/ ROBOT & INTRAOPERATIVE EGD    Surgeon: Nakul Ríos MD    See H & P for full details of admission and Operative Note for full details of operations performed  Patient tolerated surgery well without complications  In the morning postoperative Day 1, the patient had mild nausea and abdominal pain  Tolerated a clear liquid diet without vomiting  Able to ambulate and voiding independently  Patient was deemed ready for discharge home on lovenox  SLIM consulted for home medication management  Patient was seen and examined prior to discharge  Provisions for Follow-Up Care:  See After Visit Summary/Discharge Instructions for information related to follow-up care and home orders  Disposition: Home, in stable condition  Planned Readmission: No    Discharge Medications:  See After Visit Summary/Discharge Instructions for reconciled discharge medications provided to patient and family  Post Operative instructions: Reviewed with patient and/or family      Signature:   Dino Hernandez PA-C  Date: 3/1/2023 Time: 1:16 PM
Gastroenterology
Cardiology
Electrophysiology
Nephrology
Vascular Surgery
Intervent Radiology

## 2024-04-16 NOTE — CONSULT NOTE ADULT - CONSULT REQUESTED DATE/TIME
12-Apr-2024
16-Apr-2024 12:43
12-Apr-2024 15:51
13-Apr-2024 10:52
16-Apr-2024 12:13
16-Apr-2024 15:03

## 2024-04-16 NOTE — CONSULT NOTE ADULT - REASON FOR ADMISSION
anemia,acute renal failure

## 2024-04-16 NOTE — CONSULT NOTE ADULT - NS ATTEND AMEND GEN_ALL_CORE FT
Was planned for LAAO but found to new anemia and acute renal failure. Will need GI and nephrology eval. If evidence of GI bleed ok to hold oral AC.
62-year-old male with h/o HTN, aortic dissection s/p repair, CAD and VHD with prior CABG/bio-AVR/bio-MVR (2020), bowel ischemia treated with resection, ESRD on dialysis (pt no longer on dialysis), AVMs s/p cauterization, atrial fibrillation/flutter with previous RFA, PVCs, sudural/subarachnoid hemorrhage, restarted Eliquis 8/2023, MUSTAPHA performed 9/27/23 with +MV vegetation and source thought to be AV graft which was removed 10/1/23, treated with 6 week A/B course, repeat MUSTAPHA in December 2023 negative for any vegetations. AV fistula thought to be responsible for the infection which has been removed.  He was schedule for BLAINE occlusion/Watchman implant on 4/12 but was noted to have SANTOS (Cr 5; baseline ~2) and anemia (Hg 7.1). Last dose of Eliquis 4/12. General cardiology consulted today due to NSVT in the setting of anemia and CKD.      NSVT  Hypertensive urgency  ESRD preparing for HD  anemia acute on chronic  hx of aortic dissection s/p repair  coronary artery disease s/p CABG  valvular heart disease s/p AVR, MVR bioprosthetic 2020  bowel ischemic history  hx of atrial flutter with RFA ablation  known AV fistual infection      scheduled for watchman 4/12 but noted to have SANTOS on CKD and procedure was canceled  chronic anemia    complicated course in september 2023 with endocarditis, 6 weeks of antibiotics, repeat MUSTAPHA without vegetations. AV fistula graft removal.    12 beats of NSVT, asymptomatic.  metabolic derangements are noted  anemia is noted.    hold apixiban at present  recheck 2d TTE    he has non-modifiable risks for planned procedures, HD catheter placement, possible GI intervention (colonoscopy, endoscopy). He would be considered elevated risk irrespective. Recommend continued use of AV niki agent, bblocker.

## 2024-04-16 NOTE — PROGRESS NOTE ADULT - SUBJECTIVE AND OBJECTIVE BOX
NEPHROLOGY INTERVAL HPI/OVERNIGHT EVENTS:    Examined  Renal function worsening  Suspect start of uremic symptoms  Denies HA CP no SOB    MEDICATIONS  (STANDING):  amLODIPine   Tablet 10 milliGRAM(s) Oral daily  atorvastatin 40 milliGRAM(s) Oral at bedtime  carvedilol 6.25 milliGRAM(s) Oral every 12 hours  cloNIDine 0.1 milliGRAM(s) Oral two times a day  hydrALAZINE 50 milliGRAM(s) Oral every 12 hours  isosorbide   mononitrate ER Tablet (IMDUR) 30 milliGRAM(s) Oral daily  levETIRAcetam 500 milliGRAM(s) Oral two times a day  Nephro-jeanie 1 Tablet(s) Oral daily  pantoprazole  Injectable 40 milliGRAM(s) IV Push every 12 hours  sodium chloride 0.45% 1000 milliLiter(s) (100 mL/Hr) IV Continuous <Continuous>  tamsulosin 0.4 milliGRAM(s) Oral at bedtime    MEDICATIONS  (PRN):  acetaminophen     Tablet .. 650 milliGRAM(s) Oral every 6 hours PRN Temp greater or equal to 38C (100.4F), Mild Pain (1 - 3)  ALPRAZolam 0.25 milliGRAM(s) Oral three times a day PRN anxiety  aluminum hydroxide/magnesium hydroxide/simethicone Suspension 30 milliLiter(s) Oral every 4 hours PRN Dyspepsia  cyclobenzaprine 5 milliGRAM(s) Oral three times a day PRN Muscle Spasm  hydrALAZINE Injectable 10 milliGRAM(s) IV Push every 4 hours PRN sbp>159  labetalol Injectable 10 milliGRAM(s) IV Push every 6 hours PRN Systolic blood pressure >160  melatonin 3 milliGRAM(s) Oral at bedtime PRN Insomnia  ondansetron Injectable 4 milliGRAM(s) IV Push every 8 hours PRN Nausea and/or Vomiting      Allergies    penicillin (Other)    Intolerances        Vital Signs Last 24 Hrs  T(C): 36.4 (2024 08:20), Max: 36.5 (2024 00:00)  T(F): 97.5 (2024 08:20), Max: 97.7 (2024 00:00)  HR: 53 (2024 08:20) (53 - 61)  BP: 134/74 (2024 08:20) (123/68 - 197/92)  BP(mean): 127 (2024 05:00) (127 - 127)  RR: 17 (2024 08:20) (17 - 18)  SpO2: 97% (2024 08:20) (93% - 98%)    Parameters below as of 2024 08:20  Patient On (Oxygen Delivery Method): room air      Daily     Daily Weight in k.6 (2024 05:00)    PHYSICAL EXAM:  GENERAL: NAD  HEAD: NCAT  NECK: Supple, No JVD,  NERVOUS SYSTEM:  Alert & Oriented X3, No asterixis   CHEST/LUNG: Clear / EAE   HEART: Regular rate and rhythm; No rub   ABDOMEN: Soft, Nontender, Nondistended  EXTREMITIES:  No edema     LABS:                        7.7    5.89  )-----------( 120      ( 2024 04:47 )             24.3     04-16    141  |  106  |  62.2<H>  ----------------------------<  94  3.4<L>   |  19.0<L>  |  6.27<H>    Ca    7.7<L>      2024 04:47  Phos  4.3     -16  Mg     1.9         TPro  5.8<L>  /  Alb  3.2<L>  /  TBili  0.3<L>  /  DBili  x   /  AST  6   /  ALT  9   /  AlkPhos  65  04-16      Urinalysis Basic - ( 2024 04:47 )    Color: x / Appearance: x / SG: x / pH: x  Gluc: 94 mg/dL / Ketone: x  / Bili: x / Urobili: x   Blood: x / Protein: x / Nitrite: x   Leuk Esterase: x / RBC: x / WBC x   Sq Epi: x / Non Sq Epi: x / Bacteria: x      Phosphorus: 4.3 mg/dL ( @ 04:47)  Magnesium: 1.9 mg/dL ( @ 04:47)          RADIOLOGY & ADDITIONAL TESTS:

## 2024-04-16 NOTE — CONSULT NOTE ADULT - PROBLEM SELECTOR RECOMMENDATION 9
.  - called for NSVT 12 beats, patient asymptomatic  - Currently NSR  - hx of CABG  - in the setting of progressively worsening renal function and anemia  - NSVT likely related to metabolic derangement   - would optimize K and Mg  - check EKG  - pending TTE for evaluation of cardiac function and any valvular abnormalities  - continue coreg, continue statin

## 2024-04-17 ENCOUNTER — RESULT REVIEW (OUTPATIENT)
Age: 64
End: 2024-04-17

## 2024-04-17 LAB
ANION GAP SERPL CALC-SCNC: 15 MMOL/L — SIGNIFICANT CHANGE UP (ref 5–17)
BUN SERPL-MCNC: 63.7 MG/DL — HIGH (ref 8–20)
CALCIUM SERPL-MCNC: 8.1 MG/DL — LOW (ref 8.4–10.5)
CHLORIDE SERPL-SCNC: 107 MMOL/L — SIGNIFICANT CHANGE UP (ref 96–108)
CO2 SERPL-SCNC: 19 MMOL/L — LOW (ref 22–29)
CREAT SERPL-MCNC: 5.37 MG/DL — HIGH (ref 0.5–1.3)
EGFR: 11 ML/MIN/1.73M2 — LOW
GLUCOSE SERPL-MCNC: 89 MG/DL — SIGNIFICANT CHANGE UP (ref 70–99)
HCT VFR BLD CALC: 25.6 % — LOW (ref 39–50)
HGB BLD-MCNC: 8.1 G/DL — LOW (ref 13–17)
MAGNESIUM SERPL-MCNC: 2 MG/DL — SIGNIFICANT CHANGE UP (ref 1.6–2.6)
MCHC RBC-ENTMCNC: 29.8 PG — SIGNIFICANT CHANGE UP (ref 27–34)
MCHC RBC-ENTMCNC: 31.6 GM/DL — LOW (ref 32–36)
MCV RBC AUTO: 94.1 FL — SIGNIFICANT CHANGE UP (ref 80–100)
MRSA PCR RESULT.: SIGNIFICANT CHANGE UP
PLATELET # BLD AUTO: 129 K/UL — LOW (ref 150–400)
POTASSIUM SERPL-MCNC: 3.7 MMOL/L — SIGNIFICANT CHANGE UP (ref 3.5–5.3)
POTASSIUM SERPL-SCNC: 3.7 MMOL/L — SIGNIFICANT CHANGE UP (ref 3.5–5.3)
RBC # BLD: 2.72 M/UL — LOW (ref 4.2–5.8)
RBC # FLD: 14 % — SIGNIFICANT CHANGE UP (ref 10.3–14.5)
S AUREUS DNA NOSE QL NAA+PROBE: SIGNIFICANT CHANGE UP
SODIUM SERPL-SCNC: 141 MMOL/L — SIGNIFICANT CHANGE UP (ref 135–145)
WBC # BLD: 9.2 K/UL — SIGNIFICANT CHANGE UP (ref 3.8–10.5)
WBC # FLD AUTO: 9.2 K/UL — SIGNIFICANT CHANGE UP (ref 3.8–10.5)

## 2024-04-17 PROCEDURE — 99232 SBSQ HOSP IP/OBS MODERATE 35: CPT

## 2024-04-17 PROCEDURE — 93306 TTE W/DOPPLER COMPLETE: CPT | Mod: 26

## 2024-04-17 RX ORDER — ERYTHROPOIETIN 10000 [IU]/ML
20000 INJECTION, SOLUTION INTRAVENOUS; SUBCUTANEOUS ONCE
Refills: 0 | Status: DISCONTINUED | OUTPATIENT
Start: 2024-04-17 | End: 2024-04-19

## 2024-04-17 RX ORDER — IRON SUCROSE 20 MG/ML
100 INJECTION, SOLUTION INTRAVENOUS EVERY 24 HOURS
Refills: 0 | Status: DISCONTINUED | OUTPATIENT
Start: 2024-04-17 | End: 2024-04-17

## 2024-04-17 RX ORDER — HYDRALAZINE HCL 50 MG
50 TABLET ORAL EVERY 8 HOURS
Refills: 0 | Status: DISCONTINUED | OUTPATIENT
Start: 2024-04-17 | End: 2024-04-22

## 2024-04-17 RX ORDER — HYDRALAZINE HCL 50 MG
25 TABLET ORAL THREE TIMES A DAY
Refills: 0 | Status: DISCONTINUED | OUTPATIENT
Start: 2024-04-17 | End: 2024-04-17

## 2024-04-17 RX ORDER — DIPHENHYDRAMINE HCL 50 MG
50 CAPSULE ORAL AT BEDTIME
Refills: 0 | Status: DISCONTINUED | OUTPATIENT
Start: 2024-04-17 | End: 2024-04-22

## 2024-04-17 RX ADMIN — Medication 10 MILLIGRAM(S): at 18:25

## 2024-04-17 RX ADMIN — Medication 650 MILLIGRAM(S): at 04:18

## 2024-04-17 RX ADMIN — PANTOPRAZOLE SODIUM 40 MILLIGRAM(S): 20 TABLET, DELAYED RELEASE ORAL at 06:33

## 2024-04-17 RX ADMIN — PANTOPRAZOLE SODIUM 40 MILLIGRAM(S): 20 TABLET, DELAYED RELEASE ORAL at 17:14

## 2024-04-17 RX ADMIN — Medication 650 MILLIGRAM(S): at 03:48

## 2024-04-17 RX ADMIN — TAMSULOSIN HYDROCHLORIDE 0.4 MILLIGRAM(S): 0.4 CAPSULE ORAL at 20:49

## 2024-04-17 RX ADMIN — AMLODIPINE BESYLATE 10 MILLIGRAM(S): 2.5 TABLET ORAL at 06:48

## 2024-04-17 RX ADMIN — ISOSORBIDE MONONITRATE 30 MILLIGRAM(S): 60 TABLET, EXTENDED RELEASE ORAL at 11:30

## 2024-04-17 RX ADMIN — Medication 50 MILLIGRAM(S): at 06:49

## 2024-04-17 RX ADMIN — CARVEDILOL PHOSPHATE 6.25 MILLIGRAM(S): 80 CAPSULE, EXTENDED RELEASE ORAL at 06:49

## 2024-04-17 RX ADMIN — Medication 50 MILLIGRAM(S): at 20:49

## 2024-04-17 RX ADMIN — Medication 0.2 MILLIGRAM(S): at 17:15

## 2024-04-17 RX ADMIN — Medication 0.1 MILLIGRAM(S): at 06:48

## 2024-04-17 RX ADMIN — Medication 1 TABLET(S): at 11:30

## 2024-04-17 RX ADMIN — CARVEDILOL PHOSPHATE 6.25 MILLIGRAM(S): 80 CAPSULE, EXTENDED RELEASE ORAL at 17:15

## 2024-04-17 RX ADMIN — LEVETIRACETAM 500 MILLIGRAM(S): 250 TABLET, FILM COATED ORAL at 17:15

## 2024-04-17 RX ADMIN — Medication 0.25 MILLIGRAM(S): at 00:27

## 2024-04-17 RX ADMIN — ATORVASTATIN CALCIUM 40 MILLIGRAM(S): 80 TABLET, FILM COATED ORAL at 20:49

## 2024-04-17 RX ADMIN — Medication 0.25 MILLIGRAM(S): at 20:49

## 2024-04-17 RX ADMIN — LEVETIRACETAM 500 MILLIGRAM(S): 250 TABLET, FILM COATED ORAL at 06:34

## 2024-04-17 NOTE — PROGRESS NOTE ADULT - ASSESSMENT
63year-old male with h/o seizure, HTN, aortic dissection s/p repair, CAD and VHD with prior CABG/bio-AVR/bio-MVR, bowel ischemia treated with resection, ESRD was on dialysis in past (pt no longer on dialysis), AVMs s/p cauterization, atrial fibrillation/flutter with previous RFA, PVCs, subdural/subarachnoid hemorrhage, restarted Eliquis 8/2023, MUSTAPHA performed 9/27/23 with +MV vegetation and source thought to be AV graft which was removed 10/1/23, treated with 6 week A/B course, repeat MUSTAPHA in December 2023 negative for any vegetations. AV fistula thought to be responsible for the infection which has been removed.  He was schedule for BLAINE occlusion/Watchman implant today and was noted to have SANTOS (Cr 5; baseline ~2) and anemia (Hg 7.1)He also reports chronic loose stools. No blood seen in stool/emesis. Pt is voiding urine w/o any issues.    Acute on CKD st 3  -hx of prior HD    baseline 1.8-2  renal following  HD to be resumed  IR to place Permacath FRIDAY, NPO thurs night     NSVT:  echo, maintain K>4, mg>2    [facility] cardio following    Hypertensive urgency  -Telemetry  -sbp was 244  -clonidin,coreg,amlodipine. isosorbide   -CTH stable    Anemia  ? Anemia chronic disease  -Repeat hb 7.9. last hb DEC 2023 11.1  -iron panel--> venofer  -ppi bid   -hold eliquis  -dvt ppx scd's  s/p 1 U PRBC   gi following : colonoscopy pending renal function stabilization      HX afib/flutter  -plan for BLAINE occlusion/Watchman implant which was  cancelled  -EP following    HX aortic dissection s/p repair, CAD and VHD with prior CABG/bio-AVR/bio-MVR  -Continue home meds  -hold eliquis  hold lasix    hx  seizure  -on keppra at home  -per pharmacy adjust to renal dose    insomnia: trial of benadryl, ambien doesn't work     dvt ppx scd;s    Plan of care dw pt  dw nephrology

## 2024-04-17 NOTE — PROGRESS NOTE ADULT - SUBJECTIVE AND OBJECTIVE BOX
seen for renal failure/ anemia    no acute complaints  +insomnia, ambien doesnt work    MEDICATIONS  (STANDING):  amLODIPine   Tablet 10 milliGRAM(s) Oral daily  atorvastatin 40 milliGRAM(s) Oral at bedtime  carvedilol 6.25 milliGRAM(s) Oral every 12 hours  cloNIDine 0.2 milliGRAM(s) Oral two times a day  diphenhydrAMINE 50 milliGRAM(s) Oral at bedtime  epoetin yolanda-epbx (RETACRIT) Injectable 25089 Unit(s) IV Push once  hydrALAZINE 25 milliGRAM(s) Oral three times a day  hydrALAZINE 50 milliGRAM(s) Oral every 12 hours  isosorbide   mononitrate ER Tablet (IMDUR) 30 milliGRAM(s) Oral daily  levETIRAcetam 500 milliGRAM(s) Oral two times a day  Nephro-jeanie 1 Tablet(s) Oral daily  pantoprazole  Injectable 40 milliGRAM(s) IV Push every 12 hours  tamsulosin 0.4 milliGRAM(s) Oral at bedtime    MEDICATIONS  (PRN):  acetaminophen     Tablet .. 650 milliGRAM(s) Oral every 6 hours PRN Temp greater or equal to 38C (100.4F), Mild Pain (1 - 3)  ALPRAZolam 0.25 milliGRAM(s) Oral three times a day PRN anxiety  aluminum hydroxide/magnesium hydroxide/simethicone Suspension 30 milliLiter(s) Oral every 4 hours PRN Dyspepsia  cyclobenzaprine 5 milliGRAM(s) Oral three times a day PRN Muscle Spasm  hydrALAZINE Injectable 10 milliGRAM(s) IV Push every 4 hours PRN sbp>159  melatonin 3 milliGRAM(s) Oral at bedtime PRN Insomnia  ondansetron Injectable 4 milliGRAM(s) IV Push every 8 hours PRN Nausea and/or Vomiting      Allergies    penicillin (Other)      Vital Signs Last 24 Hrs  T(C): 36.4 (17 Apr 2024 09:21), Max: 36.7 (17 Apr 2024 06:22)  T(F): 97.6 (17 Apr 2024 09:21), Max: 98.1 (17 Apr 2024 06:22)  HR: 71 (17 Apr 2024 11:25) (60 - 84)  BP: 159/78 (17 Apr 2024 11:25) (157/78 - 179/73)  BP(mean): 106 (16 Apr 2024 20:35) (97 - 106)  RR: 18 (17 Apr 2024 09:21) (18 - 18)  SpO2: 97% (17 Apr 2024 09:21) (92% - 97%)    Parameters below as of 17 Apr 2024 09:21  Patient On (Oxygen Delivery Method): room air        PHYSICAL EXAM:    GENERAL: NAD  CHEST/LUNG: Clear to ausculation bilaterally  HEART: Regular rate and rhythm; S1 S2  ABDOMEN: Soft,  Bowel sounds present  EXTREMITIES: no edema   NERVOUS SYSTEM:  Alert & Oriented X3, Motor Strength 5/5 B/L upper and lower extremities  PSYCH: normal mood, appropriate response.    LABS:                        8.1    9.20  )-----------( 129      ( 17 Apr 2024 04:55 )             25.6     04-17    141  |  107  |  63.7<H>  ----------------------------<  89  3.7   |  19.0<L>  |  5.37<H>    Ca    8.1<L>      17 Apr 2024 04:55  Phos  4.3     04-16  Mg     2.0     04-17    TPro  5.8<L>  /  Alb  3.2<L>  /  TBili  0.3<L>  /  DBili  x   /  AST  6   /  ALT  9   /  AlkPhos  65  04-16      Urinalysis Basic - ( 17 Apr 2024 04:55 )    Color: x / Appearance: x / SG: x / pH: x  Gluc: 89 mg/dL / Ketone: x  / Bili: x / Urobili: x   Blood: x / Protein: x / Nitrite: x   Leuk Esterase: x / RBC: x / WBC x   Sq Epi: x / Non Sq Epi: x / Bacteria: x        CAPILLARY BLOOD GLUCOSE            RADIOLOGY & ADDITIONAL TESTS:

## 2024-04-17 NOTE — PROGRESS NOTE ADULT - SUBJECTIVE AND OBJECTIVE BOX
[facility]                                                         CARDIOLOGY AT [place] [address]                                                         Telephone: [phone]. Fax:[phone]                                                                             PROGRESS NOTE    Reason for follow up: NSVT  Update: 12 beats NSVT yesterday afternoon.       Review of symptoms:   Cardiac:  No chest pain. No dyspnea. No palpitations.  Respiratory: no cough. No dyspnea  Gastrointestinal: No diarrhea. No abdominal pain. No bleeding.   Neuro: No focal neuro complaints.    Vitals:  T(C): 36.4 (04-17-24 @ 09:21), Max: 36.7 (04-17-24 @ 06:22)  HR: 68 (04-17-24 @ 09:21) (60 - 84)  BP: 167/71 (04-17-24 @ 09:21) (157/78 - 179/73)  RR: 18 (04-17-24 @ 09:21) (18 - 18)  SpO2: 97% (04-17-24 @ 09:21) (92% - 97%)  Wt(kg): --  I&O's Summary    16 Apr 2024 07:01  -  17 Apr 2024 07:00  --------------------------------------------------------  IN: 640 mL / OUT: 650 mL / NET: -10 mL    17 Apr 2024 07:01  -  17 Apr 2024 10:02  --------------------------------------------------------  IN: 0 mL / OUT: 300 mL / NET: -300 mL      Weight (kg): 75 (04-12 @ 14:08)    PHYSICAL EXAM:  Appearance: Comfortable. No acute distress  HEENT:  Atraumatic. Normocephalic.  Normal oral mucosa  Neurologic: A & O x 3, no gross focal deficits.  Cardiovascular: RRR S1 S2, No murmur, no rubs/gallops. No JVD  Respiratory: Lungs clear to auscultation, unlabored   Gastrointestinal:  Soft, Non-tender, + BS  Lower Extremities: 2+ Peripheral Pulses, No clubbing, cyanosis, or edema  Psychiatry: Patient is calm. No agitation.   Skin: warm and dry.    CURRENT CARDIAC MEDICATIONS:  amLODIPine   Tablet 10 milliGRAM(s) Oral daily  carvedilol 6.25 milliGRAM(s) Oral every 12 hours  cloNIDine 0.1 milliGRAM(s) Oral two times a day  hydrALAZINE 50 milliGRAM(s) Oral every 12 hours  hydrALAZINE Injectable 10 milliGRAM(s) IV Push every 4 hours PRN  isosorbide   mononitrate ER Tablet (IMDUR) 30 milliGRAM(s) Oral daily  labetalol Injectable 10 milliGRAM(s) IV Push every 6 hours PRN      CURRENT OTHER MEDICATIONS:  acetaminophen     Tablet .. 650 milliGRAM(s) Oral every 6 hours PRN Temp greater or equal to 38C (100.4F), Mild Pain (1 - 3)  ALPRAZolam 0.25 milliGRAM(s) Oral three times a day PRN anxiety  cyclobenzaprine 5 milliGRAM(s) Oral three times a day PRN Muscle Spasm  levETIRAcetam 500 milliGRAM(s) Oral two times a day  melatonin 3 milliGRAM(s) Oral at bedtime PRN Insomnia  ondansetron Injectable 4 milliGRAM(s) IV Push every 8 hours PRN Nausea and/or Vomiting  zolpidem 5 milliGRAM(s) Oral at bedtime PRN Insomnia  aluminum hydroxide/magnesium hydroxide/simethicone Suspension 30 milliLiter(s) Oral every 4 hours PRN Dyspepsia  pantoprazole  Injectable 40 milliGRAM(s) IV Push every 12 hours  atorvastatin 40 milliGRAM(s) Oral at bedtime  Nephro-jeanie 1 Tablet(s) Oral daily  sodium chloride 0.45% 1000 milliLiter(s) (100 mL/Hr) IV Continuous <Continuous>, Stop order after: 24 Hours  tamsulosin 0.4 milliGRAM(s) Oral at bedtime      LABS:	 	                            8.1    9.20  )-----------( 129      ( 17 Apr 2024 04:55 )             25.6     04-17    141  |  107  |  63.7<H>  ----------------------------<  89  3.7   |  19.0<L>  |  5.37<H>    Ca    8.1<L>      17 Apr 2024 04:55  Phos  4.3     04-16  Mg     2.0     04-17    TPro  5.8<L>  /  Alb  3.2<L>  /  TBili  0.3<L>  /  DBili  x   /  AST  6   /  ALT  9   /  AlkPhos  65  04-16    PT/INR/PTT ( 12 Apr 2024 15:45 )                       :                       :      14.6         :       33.1                  .        .                   .              .           .       1.33        .                                       Lipid Profile: Date: 04-13 @ 02:42  Total cholesterol 105; Direct LDL: --; HDL: 46; Triglycerides:85    HgA1c:   TSH:     TELEMETRY: NSR with occasional NSVT  ECG:    DIAGNOSTIC TESTING:  [ ] Echocardiogram:   < from: MUSTAPHA W or WO Ultrasound Enhancing Agent (12.12.23 @ 10:12) >  TRANSESOPHAGEAL ECHOCARDIOGRAM REPORT  ________________________________________________________________________________                                      _______       Pt. Name:       JONATHAN GIBSON Study Date:    12/12/2023  MRN:   [id number]            YOB: 1960  Accession #:    [id number]             Age:           63 years  Account#:       [id number]            Gender:        M  Heart Rate:     65 bpm                Height:        68.00 in (172.72 cm)  Rhythm:         sinus rhythm          Weight:        175.00 lb (79.38 kg)  Blood Pressure: 186/89 mmHg           BSA/BMI:       1.93 m² / 26.61 kg/m²  ________________________________________________________________________________________  Referring Physician:    [id number] Jaspal Whitaker  Interpreting Physician: Jaspal Whitaker  Primary Sonographer:    Ken Vazquez    CPT:               ECHO TRANSESOPH W/O CON - 64416.m  Indication(s):     Endocarditis, valve unspecified - I38  Procedure:         Transesophageal echocardiogram performed with 2D, M-mode and                     complete spectral and color flow Doppler.  Ordering Location: [facility]/S  Study Information: Image quality for this study is good.    _______________________________________________________________________________________     CONCLUSIONS:      1. Left ventricular systolic function is normal.   2. The left atrium is severely dilated.   3. A bioprosthetic valve replacement present in the aortic position. No prosthetic aortic stenosis. No intravalvular regurgitation No paravalvular regurgitation.   4. Bioprosthetic valve present. in the mitral position. Well seated prosthetic mitral valve with normal function with normal leaflet excursion. Transvalvular mitral gradients are normal. No prosthetic mitral stenosis. There is No intravalvular mitral regurgitation. Moderate paravalvular mitral (one jet) regurgitation, originating at 9 o'clock, directed centrally.   5. No pericardial effusion seen.   6. No echocardiographic evidenceof vegetations.   7. Compared to the transesophageal echocardiogram performed on 9/1/2023 Mitral valve vegetation is resolved.    < end of copied text >  [ ]  Catheterization:  < from: Cardiac Cath Lab - Adult (11.10.20 @ 07:43) >  VENTRICLES: EF estimated was 50 %.  VALVES: AORTIC VALVE: There was moderate to severe aortic insufficiency.  MITRAL VALVE: The mitral valve exhibited moderate to severe regurgitation.  CORONARY VESSELS: The coronary circulation is right dominant.  LM:   --  LM: The left anterior descending and circumflex arteries arose  anomalously from separate ostia.  LAD:   --  LAD: Angiography showed moderate atherosclerosis.  CX:   --  Proximal circumflex: There was a discrete 60 % stenosis.  --  OM1: The vessel was small to medium sized. There was a discrete 80 %  stenosis. The lesion was irregularly contoured and eccentric.  --  OM2: The vessel was medium to large sized. There was a discrete 80 %  stenosis. The lesion was irregularly contoured, eccentric, and heavily  calcified.  RCA:   --  Proximal RCA: There was a 100 % stenosis. This lesion is a  chronic total occlusion. The distal vessel fills via collaterals from the  LCA.  AORTA: The root exhibited dilatation. Ascending aorta: The segment was  normal in size. Graft repair intact. Aortic arch: Normal. Known residual  Type B dissection by CTA. Descending aorta: The segment was normal in  size. Known residual Type B dissection by CTA.  ARCH VESSELS: Innominate: Known residual Type B dissection by CTA. Proximal  left subclavian: Known residual Type B dissection by CTA. LIMA: Normal.  TAMMY: Normal.  COMPLICATIONS: No complications occurred during the cath lab visit.  SUMMARY:  Summary: Addendum 11/11/20: Case reconfirmed to remove Bypass Graph Study,  duplicate Left Subclavian procedure,change contrast from 1665 to 165 and  add Inominate Angiography procedure    < end of copied text >  [ ] Stress Test:    OTHER:

## 2024-04-17 NOTE — PROGRESS NOTE ADULT - ASSESSMENT
62-year-old male with h/o HTN, aortic dissection s/p repair, CAD and VHD with prior CABG/bio-AVR/bio-MVR (2020), bowel ischemia treated with resection, ESRD on dialysis (pt no longer on dialysis), AVMs s/p cauterization, atrial fibrillation/flutter with previous RFA, PVCs, sudural/subarachnoid hemorrhage, restarted Eliquis 8/2023, MUSTAPHA performed 9/27/23 with +MV vegetation and source thought to be AV graft which was removed 10/1/23, treated with 6 week A/B course, repeat MUSTAPHA in December 2023 negative for any vegetations. AV fistula thought to be responsible for the infection which has been removed.  He was schedule for BLAINE occlusion/Watchman implant on 4/12 but was noted to have SANTOS (Cr 5; baseline ~2) and anemia (Hg 7.1). Last dose of Eliquis 4/12. General cardiology consulted today due to NSVT in the setting of anemia and CKD.

## 2024-04-17 NOTE — PROGRESS NOTE ADULT - PROBLEM SELECTOR PLAN 1
.  - called for NSVT 12 beats, patient asymptomatic, still with some ectopy  - Currently NSR, having episodes of bradycardia, not always while sleeping  - hx of CABG  - in the setting of progressively worsening renal function and anemia  - NSVT likely related to metabolic derangement   - would optimize K and Mg  - pending TTE for evaluation of cardiac function and any valvular abnormalities  - continue coreg, continue statin.

## 2024-04-17 NOTE — PROGRESS NOTE ADULT - SUBJECTIVE AND OBJECTIVE BOX
NEPHROLOGY INTERVAL HPI/OVERNIGHT EVENTS:    No new events.    MEDICATIONS  (STANDING):  amLODIPine   Tablet 10 milliGRAM(s) Oral daily  atorvastatin 40 milliGRAM(s) Oral at bedtime  carvedilol 6.25 milliGRAM(s) Oral every 12 hours  cloNIDine 0.1 milliGRAM(s) Oral two times a day  diphenhydrAMINE 50 milliGRAM(s) Oral at bedtime  epoetin yolanda-epbx (RETACRIT) Injectable 40758 Unit(s) IV Push once  hydrALAZINE 50 milliGRAM(s) Oral every 12 hours  iron sucrose Injectable 100 milliGRAM(s) IV Push every 24 hours  isosorbide   mononitrate ER Tablet (IMDUR) 30 milliGRAM(s) Oral daily  levETIRAcetam 500 milliGRAM(s) Oral two times a day  Nephro-jeanie 1 Tablet(s) Oral daily  pantoprazole  Injectable 40 milliGRAM(s) IV Push every 12 hours  tamsulosin 0.4 milliGRAM(s) Oral at bedtime    MEDICATIONS  (PRN):  acetaminophen     Tablet .. 650 milliGRAM(s) Oral every 6 hours PRN Temp greater or equal to 38C (100.4F), Mild Pain (1 - 3)  ALPRAZolam 0.25 milliGRAM(s) Oral three times a day PRN anxiety  aluminum hydroxide/magnesium hydroxide/simethicone Suspension 30 milliLiter(s) Oral every 4 hours PRN Dyspepsia  cyclobenzaprine 5 milliGRAM(s) Oral three times a day PRN Muscle Spasm  hydrALAZINE Injectable 10 milliGRAM(s) IV Push every 4 hours PRN sbp>159  labetalol Injectable 10 milliGRAM(s) IV Push every 6 hours PRN Systolic blood pressure >160  melatonin 3 milliGRAM(s) Oral at bedtime PRN Insomnia  ondansetron Injectable 4 milliGRAM(s) IV Push every 8 hours PRN Nausea and/or Vomiting      Allergies    penicillin (Other)          Vital Signs Last 24 Hrs  T(C): 36.4 (2024 09:21), Max: 36.7 (2024 06:22)  T(F): 97.6 (2024 09:21), Max: 98.1 (2024 06:22)  HR: 71 (2024 11:25) (60 - 84)  BP: 159/78 (2024 11:25) (157/78 - 179/73)  BP(mean): 106 (2024 20:35) (97 - 106)  RR: 18 (2024 09:21) (18 - 18)  SpO2: 97% (2024 09:21) (92% - 97%)    Parameters below as of 2024 09:21  Patient On (Oxygen Delivery Method): room air         Daily Weight in k.6 (2024 05:15)    PHYSICAL EXAM:    GENERAL: comfortable  supine in bed  HEAD:  wnl  EYES: open  ENMT:   NECK: veins flat  NERVOUS SYSTEM:  alert, oriented  CHEST/LUNG: no 02, clear  HEART: RR, 3/6 systolic  murmur  ABDOMEN: nt  EXTREMITIES:  no edema  LYMPH:   SKIN: pale  : neg      LABS:                        8.1    9.20  )-----------( 129      ( 2024 04:55 )             25.6     04-    141  |  107  |  63.7<H>  ----------------------------<  89  3.7   |  19.0<L>  |  5.37<H>    Ca    8.1<L>      2024 04:55  Phos  4.3     -16  Mg     2.0         TPro  5.8<L>  /  Alb  3.2<L>  /  TBili  0.3<L>  /  DBili  x   /  AST  6   /  ALT  9   /  AlkPhos  65  04-16      Urinalysis Basic - ( 2024 04:55 )    Color: x / Appearance: x / SG: x / pH: x  Gluc: 89 mg/dL / Ketone: x  / Bili: x / Urobili: x   Blood: x / Protein: x / Nitrite: x   Leuk Esterase: x / RBC: x / WBC x   Sq Epi: x / Non Sq Epi: x / Bacteria: x      Magnesium: 2.0 mg/dL ( @ 04:55)          RADIOLOGY & ADDITIONAL TESTS:

## 2024-04-18 LAB
ALBUMIN SERPL ELPH-MCNC: 3.4 G/DL — SIGNIFICANT CHANGE UP (ref 3.3–5.2)
ALP SERPL-CCNC: 68 U/L — SIGNIFICANT CHANGE UP (ref 40–120)
ALT FLD-CCNC: 8 U/L — SIGNIFICANT CHANGE UP
ANION GAP SERPL CALC-SCNC: 15 MMOL/L — SIGNIFICANT CHANGE UP (ref 5–17)
AST SERPL-CCNC: 9 U/L — SIGNIFICANT CHANGE UP
BILIRUB SERPL-MCNC: 0.8 MG/DL — SIGNIFICANT CHANGE UP (ref 0.4–2)
BUN SERPL-MCNC: 66.3 MG/DL — HIGH (ref 8–20)
CALCIUM SERPL-MCNC: 8.4 MG/DL — SIGNIFICANT CHANGE UP (ref 8.4–10.5)
CALCIUM SERPL-MCNC: 8.5 MG/DL — SIGNIFICANT CHANGE UP (ref 8.4–10.5)
CHLORIDE SERPL-SCNC: 108 MMOL/L — SIGNIFICANT CHANGE UP (ref 96–108)
CO2 SERPL-SCNC: 19 MMOL/L — LOW (ref 22–29)
CREAT SERPL-MCNC: 5.48 MG/DL — HIGH (ref 0.5–1.3)
EGFR: 11 ML/MIN/1.73M2 — LOW
GLUCOSE SERPL-MCNC: 103 MG/DL — HIGH (ref 70–99)
HAV IGM SER-ACNC: SIGNIFICANT CHANGE UP
HBV CORE IGM SER-ACNC: SIGNIFICANT CHANGE UP
HBV SURFACE AG SER-ACNC: SIGNIFICANT CHANGE UP
HCT VFR BLD CALC: 24.7 % — LOW (ref 39–50)
HCV AB S/CO SERPL IA: 0.07 S/CO — SIGNIFICANT CHANGE UP (ref 0–0.99)
HCV AB SERPL-IMP: SIGNIFICANT CHANGE UP
HGB BLD-MCNC: 7.9 G/DL — LOW (ref 13–17)
MAGNESIUM SERPL-MCNC: 1.8 MG/DL — SIGNIFICANT CHANGE UP (ref 1.6–2.6)
MCHC RBC-ENTMCNC: 30.2 PG — SIGNIFICANT CHANGE UP (ref 27–34)
MCHC RBC-ENTMCNC: 32 GM/DL — SIGNIFICANT CHANGE UP (ref 32–36)
MCV RBC AUTO: 94.3 FL — SIGNIFICANT CHANGE UP (ref 80–100)
PHOSPHATE SERPL-MCNC: 3.2 MG/DL — SIGNIFICANT CHANGE UP (ref 2.4–4.7)
PLATELET # BLD AUTO: 131 K/UL — LOW (ref 150–400)
POTASSIUM SERPL-MCNC: 3.7 MMOL/L — SIGNIFICANT CHANGE UP (ref 3.5–5.3)
POTASSIUM SERPL-SCNC: 3.7 MMOL/L — SIGNIFICANT CHANGE UP (ref 3.5–5.3)
PROT SERPL-MCNC: 6.1 G/DL — LOW (ref 6.6–8.7)
PTH-INTACT FLD-MCNC: 93 PG/ML — HIGH (ref 15–65)
RBC # BLD: 2.62 M/UL — LOW (ref 4.2–5.8)
RBC # FLD: 14.1 % — SIGNIFICANT CHANGE UP (ref 10.3–14.5)
SODIUM SERPL-SCNC: 142 MMOL/L — SIGNIFICANT CHANGE UP (ref 135–145)
WBC # BLD: 10.18 K/UL — SIGNIFICANT CHANGE UP (ref 3.8–10.5)
WBC # FLD AUTO: 10.18 K/UL — SIGNIFICANT CHANGE UP (ref 3.8–10.5)

## 2024-04-18 PROCEDURE — 99232 SBSQ HOSP IP/OBS MODERATE 35: CPT

## 2024-04-18 PROCEDURE — 93970 EXTREMITY STUDY: CPT | Mod: 26

## 2024-04-18 RX ORDER — MAGNESIUM SULFATE 500 MG/ML
1 VIAL (ML) INJECTION ONCE
Refills: 0 | Status: COMPLETED | OUTPATIENT
Start: 2024-04-18 | End: 2024-04-18

## 2024-04-18 RX ORDER — ERYTHROPOIETIN 10000 [IU]/ML
10000 INJECTION, SOLUTION INTRAVENOUS; SUBCUTANEOUS
Refills: 0 | Status: DISCONTINUED | OUTPATIENT
Start: 2024-04-18 | End: 2024-04-18

## 2024-04-18 RX ORDER — ERYTHROPOIETIN 10000 [IU]/ML
10000 INJECTION, SOLUTION INTRAVENOUS; SUBCUTANEOUS
Refills: 0 | Status: DISCONTINUED | OUTPATIENT
Start: 2024-04-18 | End: 2024-04-22

## 2024-04-18 RX ADMIN — Medication 0.2 MILLIGRAM(S): at 05:09

## 2024-04-18 RX ADMIN — LEVETIRACETAM 500 MILLIGRAM(S): 250 TABLET, FILM COATED ORAL at 05:08

## 2024-04-18 RX ADMIN — Medication 50 MILLIGRAM(S): at 05:08

## 2024-04-18 RX ADMIN — LEVETIRACETAM 500 MILLIGRAM(S): 250 TABLET, FILM COATED ORAL at 17:28

## 2024-04-18 RX ADMIN — Medication 50 MILLIGRAM(S): at 13:08

## 2024-04-18 RX ADMIN — Medication 50 MILLIGRAM(S): at 23:03

## 2024-04-18 RX ADMIN — ISOSORBIDE MONONITRATE 30 MILLIGRAM(S): 60 TABLET, EXTENDED RELEASE ORAL at 10:03

## 2024-04-18 RX ADMIN — ATORVASTATIN CALCIUM 40 MILLIGRAM(S): 80 TABLET, FILM COATED ORAL at 23:03

## 2024-04-18 RX ADMIN — PANTOPRAZOLE SODIUM 40 MILLIGRAM(S): 20 TABLET, DELAYED RELEASE ORAL at 05:09

## 2024-04-18 RX ADMIN — Medication 100 GRAM(S): at 13:08

## 2024-04-18 RX ADMIN — TAMSULOSIN HYDROCHLORIDE 0.4 MILLIGRAM(S): 0.4 CAPSULE ORAL at 23:03

## 2024-04-18 RX ADMIN — CARVEDILOL PHOSPHATE 6.25 MILLIGRAM(S): 80 CAPSULE, EXTENDED RELEASE ORAL at 05:09

## 2024-04-18 RX ADMIN — Medication 0.2 MILLIGRAM(S): at 17:27

## 2024-04-18 RX ADMIN — CARVEDILOL PHOSPHATE 6.25 MILLIGRAM(S): 80 CAPSULE, EXTENDED RELEASE ORAL at 17:28

## 2024-04-18 RX ADMIN — Medication 10 MILLIGRAM(S): at 01:27

## 2024-04-18 RX ADMIN — Medication 1 TABLET(S): at 10:03

## 2024-04-18 RX ADMIN — ONDANSETRON 4 MILLIGRAM(S): 8 TABLET, FILM COATED ORAL at 03:30

## 2024-04-18 RX ADMIN — PANTOPRAZOLE SODIUM 40 MILLIGRAM(S): 20 TABLET, DELAYED RELEASE ORAL at 17:28

## 2024-04-18 RX ADMIN — AMLODIPINE BESYLATE 10 MILLIGRAM(S): 2.5 TABLET ORAL at 05:08

## 2024-04-18 NOTE — PROGRESS NOTE ADULT - SUBJECTIVE AND OBJECTIVE BOX
seen for arf    no acute complaints  ros negative     MEDICATIONS  (STANDING):  amLODIPine   Tablet 10 milliGRAM(s) Oral daily  atorvastatin 40 milliGRAM(s) Oral at bedtime  carvedilol 6.25 milliGRAM(s) Oral every 12 hours  cloNIDine 0.2 milliGRAM(s) Oral two times a day  diphenhydrAMINE 50 milliGRAM(s) Oral at bedtime  epoetin yolanda-epbx (RETACRIT) Injectable 33351 Unit(s) IV Push once  hydrALAZINE 50 milliGRAM(s) Oral every 8 hours  isosorbide   mononitrate ER Tablet (IMDUR) 30 milliGRAM(s) Oral daily  levETIRAcetam 500 milliGRAM(s) Oral two times a day  magnesium sulfate  IVPB 1 Gram(s) IV Intermittent once  Nephro-jeanie 1 Tablet(s) Oral daily  pantoprazole  Injectable 40 milliGRAM(s) IV Push every 12 hours  tamsulosin 0.4 milliGRAM(s) Oral at bedtime    MEDICATIONS  (PRN):  acetaminophen     Tablet .. 650 milliGRAM(s) Oral every 6 hours PRN Temp greater or equal to 38C (100.4F), Mild Pain (1 - 3)  ALPRAZolam 0.25 milliGRAM(s) Oral three times a day PRN anxiety  aluminum hydroxide/magnesium hydroxide/simethicone Suspension 30 milliLiter(s) Oral every 4 hours PRN Dyspepsia  cyclobenzaprine 5 milliGRAM(s) Oral three times a day PRN Muscle Spasm  hydrALAZINE Injectable 10 milliGRAM(s) IV Push every 4 hours PRN sbp>159  melatonin 3 milliGRAM(s) Oral at bedtime PRN Insomnia  ondansetron Injectable 4 milliGRAM(s) IV Push every 8 hours PRN Nausea and/or Vomiting      Allergies    penicillin (Other)      Vital Signs Last 24 Hrs  T(C): 36.5 (18 Apr 2024 09:18), Max: 36.8 (18 Apr 2024 00:00)  T(F): 97.7 (18 Apr 2024 09:18), Max: 98.3 (18 Apr 2024 04:43)  HR: 58 (18 Apr 2024 09:18) (58 - 79)  BP: 121/65 (18 Apr 2024 09:18) (121/65 - 171/77)  BP(mean): 103 (18 Apr 2024 06:36) (103 - 107)  RR: 18 (18 Apr 2024 09:18) (18 - 20)  SpO2: 97% (18 Apr 2024 09:18) (96% - 98%)    Parameters below as of 18 Apr 2024 09:18  Patient On (Oxygen Delivery Method): room air        PHYSICAL EXAM:    GENERAL: NAD  CHEST/LUNG: Clear to ausculation bilaterally  HEART: Regular rate and rhythm; S1 S2;  ABDOMEN: Soft, Nontender, Bowel sounds present  EXTREMITIES:  no edema   NERVOUS SYSTEM:  Alert & Oriented X3,  Motor Strength 5/5 B/L upper and lower extremities  PSYCH: normal mood, appropriate response.    LABS:                        7.9    10.18 )-----------( 131      ( 18 Apr 2024 04:19 )             24.7     04-18    142  |  108  |  66.3<H>  ----------------------------<  103<H>  3.7   |  19.0<L>  |  5.48<H>    Ca    8.4      18 Apr 2024 04:19  Phos  3.2     04-18  Mg     1.8     04-18    TPro  6.1<L>  /  Alb  3.4  /  TBili  0.8  /  DBili  x   /  AST  9   /  ALT  8   /  AlkPhos  68  04-18      Urinalysis Basic - ( 18 Apr 2024 04:19 )    Color: x / Appearance: x / SG: x / pH: x  Gluc: 103 mg/dL / Ketone: x  / Bili: x / Urobili: x   Blood: x / Protein: x / Nitrite: x   Leuk Esterase: x / RBC: x / WBC x   Sq Epi: x / Non Sq Epi: x / Bacteria: x        CAPILLARY BLOOD GLUCOSE            RADIOLOGY & ADDITIONAL TESTS:

## 2024-04-18 NOTE — PROGRESS NOTE ADULT - ASSESSMENT
63year-old male with h/o seizure, HTN, aortic dissection s/p repair, CAD and VHD with prior CABG/bio-AVR/bio-MVR, bowel ischemia treated with resection, ESRD was on dialysis in past (pt no longer on dialysis), AVMs s/p cauterization, atrial fibrillation/flutter with previous RFA, PVCs, subdural/subarachnoid hemorrhage, restarted Eliquis 8/2023, MUSTAPHA performed 9/27/23 with +MV vegetation and source thought to be AV graft which was removed 10/1/23, treated with 6 week A/B course, repeat MUSTAPHA in December 2023 negative for any vegetations. AV fistula thought to be responsible for the infection which has been removed.  He was schedule for BLAINE occlusion/Watchman implant today and was noted to have SANTOS (Cr 5; baseline ~2) and anemia (Hg 7.1)He also reports chronic loose stools. No blood seen in stool/emesis. Pt is voiding urine w/o any issues.  Received 1 U PRBC for anemia, a/c on hold. planned for permacath placement for resumption of HD. cardiology recalled for NSVT and bradycardia at times, repeat echo post HD.    Acute on CKD st 3  -hx of prior HD    baseline 1.8-2  renal following  HD to be resumed  IR to place Permacath FRIDAY, NPO thurs night     NSVT:  echo, maintain K>4, mg>2    [facility] cardio following    Hypertensive urgency  -Telemetry  -sbp was 244  -clonidin,coreg,amlodipine. isosorbide   -CTH stable    Anemia  ? Anemia chronic disease  -Repeat hb 7.9. last hb DEC 2023 11.1  -iron panel--> venofer  -ppi bid   -hold eliquis  -dvt ppx scd's  s/p 1 U PRBC   gi following : colonoscopy pending renal function stabilization      HX afib/flutter  -plan for BLAINE occlusion/Watchman implant which was  cancelled  -EP following    HX aortic dissection s/p repair, CAD and VHD with prior CABG/bio-AVR/bio-MVR  -Continue home meds  -hold eliquis  hold lasix    hx  seizure  -on keppra at home  -per pharmacy adjust to renal dose    insomnia: trial of benadryl, ambien doesn't work     dvt ppx scd;s    Plan of care dw pt

## 2024-04-18 NOTE — PROGRESS NOTE ADULT - SUBJECTIVE AND OBJECTIVE BOX
NEPHROLOGY INTERVAL HPI/OVERNIGHT EVENTS:    No new events.    MEDICATIONS  (STANDING):  amLODIPine   Tablet 10 milliGRAM(s) Oral daily  atorvastatin 40 milliGRAM(s) Oral at bedtime  carvedilol 6.25 milliGRAM(s) Oral every 12 hours  cloNIDine 0.1 milliGRAM(s) Oral two times a day  diphenhydrAMINE 50 milliGRAM(s) Oral at bedtime  epoetin yolanda-epbx (RETACRIT) Injectable 71641 Unit(s) IV Push once  hydrALAZINE 50 milliGRAM(s) Oral every 12 hours  iron sucrose Injectable 100 milliGRAM(s) IV Push every 24 hours  isosorbide   mononitrate ER Tablet (IMDUR) 30 milliGRAM(s) Oral daily  levETIRAcetam 500 milliGRAM(s) Oral two times a day  Nephro-jeanie 1 Tablet(s) Oral daily  pantoprazole  Injectable 40 milliGRAM(s) IV Push every 12 hours  tamsulosin 0.4 milliGRAM(s) Oral at bedtime    MEDICATIONS  (PRN):  acetaminophen     Tablet .. 650 milliGRAM(s) Oral every 6 hours PRN Temp greater or equal to 38C (100.4F), Mild Pain (1 - 3)  ALPRAZolam 0.25 milliGRAM(s) Oral three times a day PRN anxiety  aluminum hydroxide/magnesium hydroxide/simethicone Suspension 30 milliLiter(s) Oral every 4 hours PRN Dyspepsia  cyclobenzaprine 5 milliGRAM(s) Oral three times a day PRN Muscle Spasm  hydrALAZINE Injectable 10 milliGRAM(s) IV Push every 4 hours PRN sbp>159  labetalol Injectable 10 milliGRAM(s) IV Push every 6 hours PRN Systolic blood pressure >160  melatonin 3 milliGRAM(s) Oral at bedtime PRN Insomnia  ondansetron Injectable 4 milliGRAM(s) IV Push every 8 hours PRN Nausea and/or Vomiting      Allergies    penicillin (Other)    Vital Signs Last 24 Hrs  T(C): 36.5 (2024 09:18), Max: 36.8 (2024 00:00)  T(F): 97.7 (2024 09:18), Max: 98.3 (2024 04:43)  HR: 58 (2024 09:18) (58 - 79)  BP: 121/65 (2024 09:18) (121/65 - 171/77)  BP(mean): 103 (2024 06:36) (103 - 107)  RR: 18 (2024 09:18) (18 - 20)  SpO2: 97% (2024 09:18) (96% - 98%)    Parameters below as of 2024 09:18  Patient On (Oxygen Delivery Method): room air      T(C): 36.4 (2024 09:21), Max: 36.7 (2024 06:22)  T(F): 97.6 (:21), Max: 98.1 (2024 06:22)  HR: 71 (2024 11:25) (60 - 84)  BP: 159/78 (2024 11:25) (157/78 - 179/73)  BP(mean): 106 (2024 20:35) (97 - 106)  RR: 18 (2024 09:21) (18 - 18)  SpO2: 97% (2024 09:21) (92% - 97%)    Parameters below as of 2024 09:21  Patient On (Oxygen Delivery Method): room air         Daily Weight in k.6 (2024 05:15)    PHYSICAL EXAM:    GENERAL: comfortable  supine in bed  HEAD:  wnl  EYES: open  ENMT:   NECK: veins flat  NERVOUS SYSTEM:  alert, oriented  CHEST/LUNG: no 02, clear  HEART: RR, 3/6 systolic  murmur  ABDOMEN: nt  EXTREMITIES:  no edema  LYMPH:   SKIN: pale  : neg      LABS:        142  |  108  |  66.3<H>  ----------------------------<  103<H>  3.7   |  19.0<L>  |  5.48<H>    Ca    8.4      2024 04:19  Phos  3.2       Mg     1.8         TPro  6.1<L>  /  Alb  3.4  /  TBili  0.8  /  DBili  x   /  AST  9   /  ALT  8   /  AlkPhos  68  04-18                          8.1    9.20  )-----------( 129      ( 2024 04:55 )             25.6         141  |  107  |  63.7<H>  ----------------------------<  89  3.7   |  19.0<L>  |  5.37<H>    Ca    8.1<L>      2024 04:55  Phos  4.3       Mg     2.0         TPro  5.8<L>  /  Alb  3.2<L>  /  TBili  0.3<L>  /  DBili  x   /  AST  6   /  ALT  9   /  AlkPhos  65        Urinalysis Basic - ( 2024 04:55 )    Color: x / Appearance: x / SG: x / pH: x  Gluc: 89 mg/dL / Ketone: x  / Bili: x / Urobili: x   Blood: x / Protein: x / Nitrite: x   Leuk Esterase: x / RBC: x / WBC x   Sq Epi: x / Non Sq Epi: x / Bacteria: x      Magnesium: 2.0 mg/dL ( @ 04:55)          RADIOLOGY & ADDITIONAL TESTS:

## 2024-04-18 NOTE — CHART NOTE - NSCHARTNOTEFT_GEN_A_CORE
Patient planned for HD catheter placement on 4/19.   Cardiology will follow up on 4/20 and plan for repeat TTE after fluid volume removal with HD.

## 2024-04-19 ENCOUNTER — RESULT REVIEW (OUTPATIENT)
Age: 64
End: 2024-04-19

## 2024-04-19 LAB
ANION GAP SERPL CALC-SCNC: 13 MMOL/L — SIGNIFICANT CHANGE UP (ref 5–17)
BUN SERPL-MCNC: 67.2 MG/DL — HIGH (ref 8–20)
CALCIUM SERPL-MCNC: 8.3 MG/DL — LOW (ref 8.4–10.5)
CHLORIDE SERPL-SCNC: 106 MMOL/L — SIGNIFICANT CHANGE UP (ref 96–108)
CO2 SERPL-SCNC: 20 MMOL/L — LOW (ref 22–29)
CREAT SERPL-MCNC: 6 MG/DL — HIGH (ref 0.5–1.3)
EGFR: 10 ML/MIN/1.73M2 — LOW
GLUCOSE SERPL-MCNC: 102 MG/DL — HIGH (ref 70–99)
HCT VFR BLD CALC: 22.3 % — LOW (ref 39–50)
HGB BLD-MCNC: 7.3 G/DL — LOW (ref 13–17)
MAGNESIUM SERPL-MCNC: 2.2 MG/DL — SIGNIFICANT CHANGE UP (ref 1.6–2.6)
MCHC RBC-ENTMCNC: 30.2 PG — SIGNIFICANT CHANGE UP (ref 27–34)
MCHC RBC-ENTMCNC: 32.7 GM/DL — SIGNIFICANT CHANGE UP (ref 32–36)
MCV RBC AUTO: 92.1 FL — SIGNIFICANT CHANGE UP (ref 80–100)
PLATELET # BLD AUTO: 129 K/UL — LOW (ref 150–400)
POTASSIUM SERPL-MCNC: 3.5 MMOL/L — SIGNIFICANT CHANGE UP (ref 3.5–5.3)
POTASSIUM SERPL-SCNC: 3.5 MMOL/L — SIGNIFICANT CHANGE UP (ref 3.5–5.3)
RBC # BLD: 2.42 M/UL — LOW (ref 4.2–5.8)
RBC # FLD: 14.1 % — SIGNIFICANT CHANGE UP (ref 10.3–14.5)
SODIUM SERPL-SCNC: 139 MMOL/L — SIGNIFICANT CHANGE UP (ref 135–145)
WBC # BLD: 5.2 K/UL — SIGNIFICANT CHANGE UP (ref 3.8–10.5)
WBC # FLD AUTO: 5.2 K/UL — SIGNIFICANT CHANGE UP (ref 3.8–10.5)

## 2024-04-19 PROCEDURE — 76937 US GUIDE VASCULAR ACCESS: CPT | Mod: 26

## 2024-04-19 PROCEDURE — 93306 TTE W/DOPPLER COMPLETE: CPT | Mod: 26

## 2024-04-19 PROCEDURE — 36558 INSERT TUNNELED CV CATH: CPT | Mod: RT

## 2024-04-19 PROCEDURE — 99233 SBSQ HOSP IP/OBS HIGH 50: CPT

## 2024-04-19 PROCEDURE — 77001 FLUOROGUIDE FOR VEIN DEVICE: CPT | Mod: 26

## 2024-04-19 RX ORDER — CARVEDILOL PHOSPHATE 80 MG/1
3.12 CAPSULE, EXTENDED RELEASE ORAL EVERY 12 HOURS
Refills: 0 | Status: DISCONTINUED | OUTPATIENT
Start: 2024-04-20 | End: 2024-04-21

## 2024-04-19 RX ORDER — SODIUM CHLORIDE 9 MG/ML
10 INJECTION INTRAMUSCULAR; INTRAVENOUS; SUBCUTANEOUS
Refills: 0 | Status: DISCONTINUED | OUTPATIENT
Start: 2024-04-19 | End: 2024-04-22

## 2024-04-19 RX ORDER — CHLORHEXIDINE GLUCONATE 213 G/1000ML
1 SOLUTION TOPICAL
Refills: 0 | Status: DISCONTINUED | OUTPATIENT
Start: 2024-04-19 | End: 2024-04-22

## 2024-04-19 RX ADMIN — Medication 50 MILLIGRAM(S): at 22:11

## 2024-04-19 RX ADMIN — AMLODIPINE BESYLATE 10 MILLIGRAM(S): 2.5 TABLET ORAL at 06:21

## 2024-04-19 RX ADMIN — Medication 1 TABLET(S): at 16:53

## 2024-04-19 RX ADMIN — PANTOPRAZOLE SODIUM 40 MILLIGRAM(S): 20 TABLET, DELAYED RELEASE ORAL at 16:47

## 2024-04-19 RX ADMIN — ATORVASTATIN CALCIUM 40 MILLIGRAM(S): 80 TABLET, FILM COATED ORAL at 22:12

## 2024-04-19 RX ADMIN — Medication 50 MILLIGRAM(S): at 22:12

## 2024-04-19 RX ADMIN — LEVETIRACETAM 500 MILLIGRAM(S): 250 TABLET, FILM COATED ORAL at 16:52

## 2024-04-19 RX ADMIN — LEVETIRACETAM 500 MILLIGRAM(S): 250 TABLET, FILM COATED ORAL at 06:21

## 2024-04-19 RX ADMIN — Medication 0.2 MILLIGRAM(S): at 16:47

## 2024-04-19 RX ADMIN — Medication 50 MILLIGRAM(S): at 06:21

## 2024-04-19 RX ADMIN — Medication 0.25 MILLIGRAM(S): at 16:47

## 2024-04-19 RX ADMIN — Medication 0.2 MILLIGRAM(S): at 06:22

## 2024-04-19 RX ADMIN — ERYTHROPOIETIN 10000 UNIT(S): 10000 INJECTION, SOLUTION INTRAVENOUS; SUBCUTANEOUS at 13:21

## 2024-04-19 RX ADMIN — Medication 0.25 MILLIGRAM(S): at 02:12

## 2024-04-19 RX ADMIN — TAMSULOSIN HYDROCHLORIDE 0.4 MILLIGRAM(S): 0.4 CAPSULE ORAL at 22:11

## 2024-04-19 RX ADMIN — Medication 50 MILLIGRAM(S): at 16:46

## 2024-04-19 RX ADMIN — PANTOPRAZOLE SODIUM 40 MILLIGRAM(S): 20 TABLET, DELAYED RELEASE ORAL at 06:22

## 2024-04-19 NOTE — PROGRESS NOTE ADULT - ASSESSMENT
62 yo M w h/o HTN, aortic dissection s/p repair, CAD and VHD with prior CABG/bio-AVR/bio-MVR, bowel ischemia treated with resection, ESRD on dialysis (pt no longer on dialysis), AVMs s/p cauterization, atrial fibrillation/flutter with previous RFA, PVCs, sudural/subarachnoid hemorrhage, restarted Eliquis 8/2023, MUSTAPHA performed 9/27/23 with +MV vegetation and source thought to be AV graft which was removed 10/1/23, treated with 6 week A/B course, repeat MUSTAPHA in December 2023 negative for any vegetations. AV fistula thought to be responsible for the infection which has been removed.  He was schedule for BLAINE occlusion/Watchman implant today nd was noted to have SANTOS (Cr 5; baseline ~2) and anemia (Hg 7.1). He denies any bleeding issues or dark stools.    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---> Needs follow up   MUSTAPHA 12/23 noted - LV function normal     SANTOS on CKD III  Serum Cr 1.7 - 1/202  >Rising Cr 4.7--> 5.4--> 5.7 --> 6.1 --> 6.2 - now started on HD  Admits to taking ADVIL PM every night --> possible interstitial nephritis + Torsemide every day   Renal US noted no hydronephrosis  s/p trial of IVF at inc rate no improvement in renal function  Serg HD when seen but later had to be taken off early    Anemia - significan - on REINALDO ; r/o GIB  SW assistance pt need HD chair in the community   Use to go to [facility] in [place] he lives near there would like to go back  Needs Hep panel and ppd    HTN  BP ok on current regimen    Anemia should check FOBT w NSAID use hx--> s/p transfusion   Will restart Retacrit 20 K units one dose given 4/15  Low iron stores on  IV Venofer    Monitor labs will follow

## 2024-04-19 NOTE — PROGRESS NOTE ADULT - SUBJECTIVE AND OBJECTIVE BOX
Acute renal failure    HPI:  62-year-old male with h/o HTN, aortic dissection s/p repair, CAD and VHD with prior CABG/bio-AVR/bio-MVR, bowel ischemia treated with resection, ESRD on dialysis (pt no longer on dialysis), AVMs s/p cauterization, atrial fibrillation/flutter with previous RFA, PVCs, sudural/subarachnoid hemorrhage, restarted Eliquis 8/2023, MUSTAPHA performed 9/27/23 with +MV vegetation and source thought to be AV graft which was removed 10/1/23, treated with 6 week A/B course, repeat MUSTAPHA in December 2023 negative for any vegetations. AV fistula thought to be responsible for the infection which has been removed.  He was schedule for BLAINE occlusion/Watchman implant today nd was noted to have SANTOS (Cr 5; baseline ~2) and anemia (Hg 7.1). He denies any bleeding issues or dark stools. Denies HA, fevers, chills, CP, palp, or SOB. He does report some recent balance issues, vertigo and episodes of N/V last night. He also reports chronic loose stools. No blood seen in stool/emesis. Pt is voiding urine w/o any issues. Pt states last emesis last night after he drunk plenty of Gatorade. Pt also drunk 2 beers. No vomiting since came to ed,ambulate to bathroom w/o any dizziness. Pt states he has on/off dizziness last one month.Last dose of eliquis last night. seen by  last week. Pt is found to have sbp 244 at ed. (12 Apr 2024 17:55)    Interval History:  Patient was seen and examined in Radiology around 11:15 am.  Feels OK.  No active complaints.     ROS:  As per interval history otherwise unremarkable.    PHYSICAL EXAM:  Vital Signs   T(C): 36.4 (19 Apr 2024 16:10), Max: 37 (18 Apr 2024 23:24)  T(F): 97.6 (19 Apr 2024 16:10), Max: 98.6 (18 Apr 2024 23:24)  HR: 60 (19 Apr 2024 16:10) (44 - 60)  BP: 168/74 (19 Apr 2024 16:10) (102/44 - 168/74)  RR: 18 (19 Apr 2024 16:10) (17 - 18)  SpO2: 95% (19 Apr 2024 16:10) (92% - 99%)  Parameters below as of 19 Apr 2024 16:10  Patient On (Oxygen Delivery Method): room air  General: Elderly male sitting in bed comfortably. No acute distress  HEENT: EOMI. Clear conjunctivae. Moist mucus membrane  Neck: Supple.   Chest: Good air entry. No wheezing, rales or rhonchi. Dialysis catheter in right upper chest.   Heart: Normal S1 & S2. RRR.   Abdomen: Non distended. Soft. Non-tender. + BS. Old healed scar.   Ext: No pedal edema. No calf tenderness   Neuro: Awake and alert. No focal deficit. Speech clear.   Skin: Warm and Dry  Psychiatry: Normal mood and affect    I&O's Summary    18 Apr 2024 07:01  -  19 Apr 2024 07:00  --------------------------------------------------------  IN: 120 mL / OUT: 300 mL / NET: -180 mL    19 Apr 2024 07:01  -  19 Apr 2024 16:39  --------------------------------------------------------  IN: 0 mL / OUT: 800 mL / NET: -800 mL    LABS:                        7.3    5.20  )-----------( 129      ( 19 Apr 2024 05:19 )             22.3     04-19    139  |  106  |  67.2<H>  ----------------------------<  102<H>  3.5   |  20.0<L>  |  6.00<H>    Ca    8.3<L>      19 Apr 2024 05:19  Phos  3.2     04-18  Mg     2.2     04-19    TPro  6.1<L>  /  Alb  3.4  /  TBili  0.8  /  DBili  x   /  AST  9   /  ALT  8   /  AlkPhos  68  04-18    Urinalysis Basic - ( 19 Apr 2024 05:19 )    Color: x / Appearance: x / SG: x / pH: x  Gluc: 102 mg/dL / Ketone: x  / Bili: x / Urobili: x   Blood: x / Protein: x / Nitrite: x   Leuk Esterase: x / RBC: x / WBC x   Sq Epi: x / Non Sq Epi: x / Bacteria: x    RADIOLOGY & ADDITIONAL STUDIES:  Reviewed     MEDICATIONS  (STANDING):  amLODIPine   Tablet 10 milliGRAM(s) Oral daily  atorvastatin 40 milliGRAM(s) Oral at bedtime  chlorhexidine 2% Cloths 1 Application(s) Topical <User Schedule>  cloNIDine 0.2 milliGRAM(s) Oral two times a day  diphenhydrAMINE 50 milliGRAM(s) Oral at bedtime  epoetin yolanda-epbx (RETACRIT) Injectable 78447 Unit(s) IV Push <User Schedule>  hydrALAZINE 50 milliGRAM(s) Oral every 8 hours  isosorbide   mononitrate ER Tablet (IMDUR) 30 milliGRAM(s) Oral daily  levETIRAcetam 500 milliGRAM(s) Oral two times a day  Nephro-jeanie 1 Tablet(s) Oral daily  pantoprazole  Injectable 40 milliGRAM(s) IV Push every 12 hours  tamsulosin 0.4 milliGRAM(s) Oral at bedtime    MEDICATIONS  (PRN):  acetaminophen     Tablet .. 650 milliGRAM(s) Oral every 6 hours PRN Temp greater or equal to 38C (100.4F), Mild Pain (1 - 3)  ALPRAZolam 0.25 milliGRAM(s) Oral three times a day PRN anxiety  aluminum hydroxide/magnesium hydroxide/simethicone Suspension 30 milliLiter(s) Oral every 4 hours PRN Dyspepsia  cyclobenzaprine 5 milliGRAM(s) Oral three times a day PRN Muscle Spasm  hydrALAZINE Injectable 10 milliGRAM(s) IV Push every 4 hours PRN sbp>159  melatonin 3 milliGRAM(s) Oral at bedtime PRN Insomnia  ondansetron Injectable 4 milliGRAM(s) IV Push every 8 hours PRN Nausea and/or Vomiting  sodium chloride 0.9% lock flush 10 milliLiter(s) IV Push every 1 hour PRN Pre/post blood products, medications, blood draw, and to maintain line patency

## 2024-04-19 NOTE — PROGRESS NOTE ADULT - ASSESSMENT
63year-old male with h/o seizure, HTN, aortic dissection s/p repair, CAD and VHD with prior CABG/bio-AVR/bio-MVR, bowel ischemia treated with resection, ESRD was on dialysis in past (pt no longer on dialysis), AVMs s/p cauterization, atrial fibrillation/flutter with previous RFA, PVCs, subdural/subarachnoid hemorrhage, restarted Eliquis 8/2023, MUSTAPHA performed 9/27/23 with +MV vegetation and source thought to be AV graft which was removed 10/1/23, treated with 6 week A/B course, repeat MUSTAPHA in December 2023 negative for any vegetations. AV fistula thought to be responsible for the infection which has been removed.  He was schedule for BLAINE occlusion/Watchman implant and was noted to have SANTOS (Cr 5; baseline ~2) and anemia (Hg 7.1). He also reported chronic loose stools. No blood seen in stool/emesis. Pt is voiding urine w/o any issues.  Received 1 U PRBC for anemia, a/c on hold. Now s/p permacath placement for resumption of HD. Cardiology recalled for NSVT and bradycardia at times, repeat echo post HD.    Acute on CKD st 3  hx of prior HD   baseline 1.8-2  s/p IR guided tunnelled dialysis cathter today   HD today   renal following    NSVT  ECHO noted   Decrease Coreg to 3.125 mg BID due to bradycardia   [facility] cardio following    Hypertensive urgency  -Telemetry  -Continue Clonidine, Coreg, Hydralazine, Amlodipine and Imdur   -CTH stable    Anemia  ? Anemia chronic disease  -Repeat hb 7.9. last hb DEC 2023 11.1  -s/p Venofer  -Continue Retacrit   s/p 1 U PRBC   GI follow up noted: outpatient colonoscopy     HX afib/flutter  -plan for BLAINE occlusion/Watchman implant which was cancelled  -EP Consult noted   -Continue Coreg  -Eliquis on hold     HX aortic dissection s/p repair, CAD and VHD with prior CABG/bio-AVR/bio-MVR  -Continue home meds  -hold eliquis    hx  seizure  -on keppra at home    DVT Prophylaxis -- Venodyne    Dispo: Likely home once stable.

## 2024-04-19 NOTE — PROGRESS NOTE ADULT - SUBJECTIVE AND OBJECTIVE BOX
Patient was seen and evaluated on dialysis.   Has new PC  No c/o CP SOB NV  no F/C  no swelling    T(C): 36.4 (04-19-24 @ 16:10), Max: 37 (04-18-24 @ 23:24)  HR: 60 (04-19-24 @ 16:10) (44 - 60)  BP: 168/74 (04-19-24 @ 16:10) (102/44 - 168/74)  Wt(kg): --  PE ;  NAD  lungs - CTA  CV gr 1 murmer,  No gallop or rub  Abd : soft, NT BS +, No masses  Ext- No edema  Neuro : Grossly intact, moving extremities                             7.3    5.20  )-----------( 129      ( 19 Apr 2024 05:19 )             22.3        04-19    139  |  106  |  67.2<H>  ----------------------------<  102<H>  3.5   |  20.0<L>  |  6.00<H>    Ca    8.3<L>      19 Apr 2024 05:19  Phos  3.2     04-18  Mg     2.2     04-19    TPro  6.1<L>  /  Alb  3.4  /  TBili  0.8  /  DBili  x   /  AST  9   /  ALT  8   /  AlkPhos  68  04-18      MEDICATIONS  (STANDING):  acetaminophen     Tablet .. PRN  ALPRAZolam PRN  aluminum hydroxide/magnesium hydroxide/simethicone Suspension PRN  amLODIPine   Tablet  atorvastatin  chlorhexidine 2% Cloths  cloNIDine  cyclobenzaprine PRN  diphenhydrAMINE  epoetin yolanda-epbx (RETACRIT) Injectable  hydrALAZINE  hydrALAZINE Injectable PRN  isosorbide   mononitrate ER Tablet (IMDUR)  levETIRAcetam  melatonin PRN  Nephro-jeanie  ondansetron Injectable PRN  pantoprazole  Injectable  sodium chloride 0.9% lock flush PRN  tamsulosin      Patient stable  Serg HD easily  Continue

## 2024-04-20 LAB — HBV SURFACE AB SER-ACNC: 3.2 MIU/ML — LOW

## 2024-04-20 PROCEDURE — 99232 SBSQ HOSP IP/OBS MODERATE 35: CPT

## 2024-04-20 PROCEDURE — 99232 SBSQ HOSP IP/OBS MODERATE 35: CPT | Mod: 25

## 2024-04-20 RX ORDER — DIAZEPAM 5 MG
5 TABLET ORAL AT BEDTIME
Refills: 0 | Status: DISCONTINUED | OUTPATIENT
Start: 2024-04-20 | End: 2024-04-21

## 2024-04-20 RX ADMIN — CHLORHEXIDINE GLUCONATE 1 APPLICATION(S): 213 SOLUTION TOPICAL at 13:31

## 2024-04-20 RX ADMIN — CARVEDILOL PHOSPHATE 3.12 MILLIGRAM(S): 80 CAPSULE, EXTENDED RELEASE ORAL at 06:07

## 2024-04-20 RX ADMIN — PANTOPRAZOLE SODIUM 40 MILLIGRAM(S): 20 TABLET, DELAYED RELEASE ORAL at 06:06

## 2024-04-20 RX ADMIN — Medication 50 MILLIGRAM(S): at 06:07

## 2024-04-20 RX ADMIN — Medication 50 MILLIGRAM(S): at 15:36

## 2024-04-20 RX ADMIN — PANTOPRAZOLE SODIUM 40 MILLIGRAM(S): 20 TABLET, DELAYED RELEASE ORAL at 18:09

## 2024-04-20 RX ADMIN — Medication 0.2 MILLIGRAM(S): at 06:07

## 2024-04-20 RX ADMIN — LEVETIRACETAM 500 MILLIGRAM(S): 250 TABLET, FILM COATED ORAL at 06:06

## 2024-04-20 RX ADMIN — ATORVASTATIN CALCIUM 40 MILLIGRAM(S): 80 TABLET, FILM COATED ORAL at 21:37

## 2024-04-20 RX ADMIN — LEVETIRACETAM 500 MILLIGRAM(S): 250 TABLET, FILM COATED ORAL at 18:08

## 2024-04-20 RX ADMIN — Medication 0.2 MILLIGRAM(S): at 18:08

## 2024-04-20 RX ADMIN — AMLODIPINE BESYLATE 10 MILLIGRAM(S): 2.5 TABLET ORAL at 06:06

## 2024-04-20 RX ADMIN — Medication 3 MILLIGRAM(S): at 21:39

## 2024-04-20 RX ADMIN — TAMSULOSIN HYDROCHLORIDE 0.4 MILLIGRAM(S): 0.4 CAPSULE ORAL at 21:38

## 2024-04-20 RX ADMIN — Medication 1 TABLET(S): at 13:31

## 2024-04-20 RX ADMIN — Medication 5 MILLIGRAM(S): at 23:27

## 2024-04-20 RX ADMIN — Medication 50 MILLIGRAM(S): at 21:38

## 2024-04-20 RX ADMIN — Medication 3 MILLIGRAM(S): at 02:00

## 2024-04-20 RX ADMIN — Medication 0.25 MILLIGRAM(S): at 02:00

## 2024-04-20 RX ADMIN — ISOSORBIDE MONONITRATE 30 MILLIGRAM(S): 60 TABLET, EXTENDED RELEASE ORAL at 13:31

## 2024-04-20 NOTE — PROGRESS NOTE ADULT - SUBJECTIVE AND OBJECTIVE BOX
Patient seen and examined    Feels fine now, had cramps on HD arnold hands on HD  no c/o CP SOB NV   No swelling feet    Vital Signs Last 24 Hrs  T(C): 36.4 (20 Apr 2024 15:32), Max: 36.8 (20 Apr 2024 00:02)  T(F): 97.5 (20 Apr 2024 15:32), Max: 98.3 (20 Apr 2024 00:02)  HR: 50 (20 Apr 2024 15:32) (49 - 55)  BP: 133/75 (20 Apr 2024 15:32) (124/58 - 171/72)  BP(mean): --  RR: 18 (20 Apr 2024 15:32) (18 - 18)  SpO2: 96% (20 Apr 2024 15:32) (91% - 96%)    Parameters below as of 20 Apr 2024 08:58  Patient On (Oxygen Delivery Method): room air        PHYSICAL EXAM    GENERAL: NAD,   EYES:  conjunctiva and sclera clear  NECK: Supple, No JVD/Bruit  NERVOUS SYSTEM:  A/O x3,   CHEST:  No rales, No rhonchi  HEART:  RRR, No murmur  ABDOMEN: Soft, NT/ND BS+  EXTREMITIES:  No Edema;  SKIN: No rashes    19 Apr 2024 05:19    139    |  106    |  67.2   ----------------------------<  102    3.5     |  20.0   |  6.00     Ca    8.3        19 Apr 2024 05:19  Mg     2.2       19 Apr 2024 05:19                            7.3    5.20  )-----------( 129      ( 19 Apr 2024 05:19 )             22.3         Continue present treatment

## 2024-04-20 NOTE — PROGRESS NOTE ADULT - PROBLEM SELECTOR PLAN 2
- continue hydralazine, coreg, amlodipine, and clonidine.   - monitor BP, currently normotensive at this time.
.  - continue hydralazine, coreg, amlodipine.

## 2024-04-20 NOTE — PROGRESS NOTE ADULT - ASSESSMENT
62-year-old male with h/o HTN, aortic dissection s/p repair, CAD and VHD with prior CABG/bio-AVR/bio-MVR (2020), bowel ischemia treated with resection, ESRD on dialysis (pt no longer on dialysis), AVMs s/p cauterization, atrial fibrillation/flutter with previous RFA, PVCs, sudural/subarachnoid hemorrhage, restarted Eliquis 8/2023, MUSTAPHA performed 9/27/23 with +MV vegetation and source thought to be AV graft which was removed 10/1/23, treated with 6 week A/B course, repeat MUSTAPHA in December 2023 negative for any vegetations. AV fistula thought to be responsible for the infection which has been removed.  He was schedule for BLAINE occlusion/Watchman implant on 4/12 but was noted to have SANTOS (Cr 5; baseline ~2) and anemia (Hg 7.1). Last dose of Eliquis 4/12. General cardiology consulted due to NSVT in the setting of anemia and CKD.

## 2024-04-20 NOTE — PROGRESS NOTE ADULT - ASSESSMENT
63year-old male with h/o seizure, HTN, aortic dissection s/p repair, CAD and VHD with prior CABG/bio-AVR/bio-MVR, bowel ischemia treated with resection, ESRD was on dialysis in past (pt no longer on dialysis), AVMs s/p cauterization, atrial fibrillation/flutter with previous RFA, PVCs, subdural/subarachnoid hemorrhage, restarted Eliquis 8/2023, MUSTAPHA performed 9/27/23 with +MV vegetation and source thought to be AV graft which was removed 10/1/23, treated with 6 week A/B course, repeat MUSTAPHA in December 2023 negative for any vegetations. AV fistula thought to be responsible for the infection which has been removed.  He was schedule for BLAINE occlusion/Watchman implant and was noted to have SANTOS (Cr 5; baseline ~2) and anemia (Hg 7.1). He also reported chronic loose stools. No blood seen in stool/emesis. Pt is voiding urine w/o any issues.  Received 1 U PRBC for anemia, a/c on hold. Now s/p permacath placement for resumption of HD. Cardiology recalled for NSVT and bradycardia at times, repeat echo post HD.    SANTOS on CKD 3 (likely ATN)   hx of prior HD   baseline 1.8-2  s/p IR guided tunnelled dialysis cathter on 4/19  HD as per Nephro   renal following    NSVT  ECHO noted   Decreased Coreg to 3.125 mg BID due to bradycardia   [facility] cardio follow up noted     Hypertensive urgency  -Telemetry  -Continue Clonidine, Coreg, Hydralazine, Amlodipine and Imdur   -CTH stable    Anemia  ? Anemia chronic disease  -Repeat hb 7.9. last hb DEC 2023 11.1  -s/p Venofer  -Continue Retacrit   s/p 1 U PRBC   GI follow up noted: outpatient colonoscopy     HX afib/flutter  -plan for BLAINE occlusion/Watchman implant which was cancelled  -EP Consult noted   -Continue Coreg  -Eliquis on hold     HX aortic dissection s/p repair, CAD and VHD with prior CABG/bio-AVR/bio-MVR  -Continue home meds  -hold eliquis    hx  seizure  -on keppra at home    Insomnia   Continue Benadryl   Melatonin PRN   Patient is asking for trial of Valium. Explained to him about addiction potential. He feels exhausted as he has not slept in days. Wants to have a good night sleep while here.   Trial of Valium if Benadryl and Melatonin don't work.   He is aware that it is only for couple of days while he is here and there will be no prescription on d/c.     DVT Prophylaxis -- Venodyne    Dispo: Home likely next week. Needs dialysis seat in community.

## 2024-04-20 NOTE — PROGRESS NOTE ADULT - ASSESSMENT
64 yo M w h/o HTN, aortic dissection s/p repair, CAD and VHD with prior CABG/bio-AVR/bio-MVR, bowel ischemia treated with resection, ESRD on dialysis (pt no longer on dialysis), AVMs s/p cauterization, atrial fibrillation/flutter with previous RFA, PVCs, sudural/subarachnoid hemorrhage, restarted Eliquis 8/2023, MUSTAPHA performed 9/27/23 with +MV vegetation and source thought to be AV graft which was removed 10/1/23, treated with 6 week A/B course, repeat MUSTAPHA in December 2023 negative for any vegetations. AV fistula thought to be responsible for the infection which has been removed.  He was schedule for BLAINE occlusion/Watchman implant today nd was noted to have SANTOS (Cr 5; baseline ~2) and anemia (Hg 7.1). He denies any bleeding issues or dark stools.    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---> Needs follow up   MUSTAPHA 12/23 noted - LV function normal     SANTOS on CKD III  Serum Cr 1.7 - 1/202  >Rising Cr 4.7--> 5.4--> 5.7 --> 6.1 --> 6.2 - now started on HD  Admits to taking ADVIL PM every night --> possible interstitial nephritis + Torsemide every day   Renal US noted no hydronephrosis  s/p trial of IVF at inc rate no improvement in renal function  Serg HD when seen but later had to be taken off early  Has good UO  Next HD Monday    Anemia - significan - on REINALDO ; r/o GIB  SW assistance pt need HD chair in the community   [facility] [place] would like to go back  Needs Hep panel and ppd    HTN  BP ok on current regimen    Anemia should check FOBT w NSAID use hx--> s/p transfusion   Will restart Retacrit 20 K units one dose given 4/15  Low iron stores on  IV Venofer    Monitor labs

## 2024-04-20 NOTE — PROGRESS NOTE ADULT - PROBLEM SELECTOR PLAN 1
- previously had NSVT 12 beats on 4/17, patient asymptomatic   - no episodes of NSVT on telemetry   - Currently NSR, having intermittent episodes of bradycardia.   - hx of CABG  - in the setting of progressively worsening renal function and anemia  - NSVT likely related to metabolic derangement  - keep Mg >2 and K>4   - c/w statin and coreg. - previously had NSVT 12 beats on 4/17, patient asymptomatic   - no episodes of NSVT on telemetry   - Currently NSR, having intermittent episodes of bradycardia.   - hx of CABG  - in the setting of progressively worsening renal function and anemia  - NSVT likely related to metabolic derangement  - keep Mg >2 and K>4   - c/w statin and coreg.     No further inpatient cardiac work up at this time.  Will sign off.  Please reconsult if needed.

## 2024-04-20 NOTE — PROGRESS NOTE ADULT - PROBLEM SELECTOR PLAN 3
- anemic but no source of bleed identified  - in the setting of ESRD, no longer on HD, fistula removed due to infection  - check stool for OB  -  hx of Afib, Eliquis on hold due to anemia. Will need source of identified prior to re-instating. No heparin GTT for now, discussed with EP. Resume Eliquis if no bleeding and if no procedures planned  - optimize H/H  - appreciate nephrology input.  - HD initiated yesterday, 4/19.
.  - anemic but no source of bleed identified  - in the setting of ESRD, no longer on HD, fistula removed due to infection  - check stool for OB  -  hx of Afib, Eliquis on hold due to anemia. Will need source of identified prior to re-instating. No heparin GTT for now, discussed with EP. Resume Eliquis if no bleeding and if no procedures planned  - optimize H/H  - appreciate nephrology input.  - per IR note, pending HD catheter placement.   - Patient is at elevated risk due to multiple comorbidities, however there is no further cardiac intervention that will improve this risk. Proceed with planned procedures.

## 2024-04-20 NOTE — PROGRESS NOTE ADULT - SUBJECTIVE AND OBJECTIVE BOX
[facility]                                                         CARDIOLOGY AT [facility]                                                                  [address], [place] [address]                                                         Telephone: [phone]. Fax:[phone]                                                                             PROGRESS NOTE    Reason for follow up: NSVT   Update: Pt seen and examined at the bedside. No episodes of NSVT noted on telemetry currently NSR/Sinus bradycardia with HR as low as 49 (nonsustained). HD was initiated yesterday.     Review of symptoms:   Cardiac:  No chest pain. No dyspnea. No palpitations.  Respiratory: no cough. No dyspnea  Gastrointestinal: No diarrhea. No abdominal pain. No bleeding.   Neuro: No focal neuro complaints.    Vitals:  T(C): 36.6 (04-20-24 @ 08:58), Max: 36.8 (04-20-24 @ 00:02)  HR: 49 (04-20-24 @ 08:58) (44 - 60)  BP: 124/58 (04-20-24 @ 08:58) (124/58 - 171/72)  RR: 18 (04-20-24 @ 08:58) (18 - 18)  SpO2: 92% (04-20-24 @ 08:58) (91% - 99%)  Wt(kg): --  I&O's Summary    19 Apr 2024 07:01  -  20 Apr 2024 07:00  --------------------------------------------------------  IN: 660 mL / OUT: 1800 mL / NET: -1140 mL    PHYSICAL EXAM:  Appearance: Comfortable. No acute distress  HEENT:  Atraumatic. Normocephalic.  Normal oral mucosa  Neurologic: A & O x 3, no gross focal deficits.  Cardiovascular: RRR S1 S2, No murmur, no rubs/gallops. No JVD  Respiratory: Lungs clear to auscultation, unlabored   Gastrointestinal:  Soft, Non-tender, + BS  Lower Extremities: 2+ Peripheral Pulses, No clubbing, cyanosis, or edema  Psychiatry: Patient is calm. No agitation.   Skin: warm and dry.    CURRENT CARDIAC MEDICATIONS:  amLODIPine   Tablet 10 milliGRAM(s) Oral daily  carvedilol 3.125 milliGRAM(s) Oral every 12 hours  cloNIDine 0.2 milliGRAM(s) Oral two times a day  hydrALAZINE 50 milliGRAM(s) Oral every 8 hours  hydrALAZINE Injectable 10 milliGRAM(s) IV Push every 4 hours PRN  isosorbide   mononitrate ER Tablet (IMDUR) 30 milliGRAM(s) Oral daily      CURRENT OTHER MEDICATIONS:  acetaminophen     Tablet .. 650 milliGRAM(s) Oral every 6 hours PRN Temp greater or equal to 38C (100.4F), Mild Pain (1 - 3)  ALPRAZolam 0.25 milliGRAM(s) Oral three times a day PRN anxiety  cyclobenzaprine 5 milliGRAM(s) Oral three times a day PRN Muscle Spasm  diphenhydrAMINE 50 milliGRAM(s) Oral at bedtime  levETIRAcetam 500 milliGRAM(s) Oral two times a day  melatonin 3 milliGRAM(s) Oral at bedtime PRN Insomnia  ondansetron Injectable 4 milliGRAM(s) IV Push every 8 hours PRN Nausea and/or Vomiting  aluminum hydroxide/magnesium hydroxide/simethicone Suspension 30 milliLiter(s) Oral every 4 hours PRN Dyspepsia  pantoprazole  Injectable 40 milliGRAM(s) IV Push every 12 hours  atorvastatin 40 milliGRAM(s) Oral at bedtime  chlorhexidine 2% Cloths 1 Application(s) Topical <User Schedule>  epoetin yolanda-epbx (RETACRIT) Injectable 06150 Unit(s) IV Push <User Schedule>  Nephro-jeanie 1 Tablet(s) Oral daily  sodium chloride 0.9% lock flush 10 milliLiter(s) IV Push every 1 hour PRN Pre/post blood products, medications, blood draw, and to maintain line patency  tamsulosin 0.4 milliGRAM(s) Oral at bedtime      LABS:	 	             7.3    5.20  )-----------( 129      ( 19 Apr 2024 05:19 )             22.3     04-19    139  |  106  |  67.2<H>  ----------------------------<  102<H>  3.5   |  20.0<L>  |  6.00<H>    Ca    8.3<L>      19 Apr 2024 05:19  Mg     2.2     04-19      PT/INR/PTT ( 12 Apr 2024 15:45 )                       :                       :      14.6         :       33.1                  .        .                   .              .           .       1.33        .                                       Lipid Profile: Date: 04-13 @ 02:42  Total cholesterol 105; Direct LDL: --; HDL: 46; Triglycerides:85    HgA1c: 5.2%     TELEMETRY: NSR/Sinus bradycardia with HR 49 to 60s   ECG: NSR     DIAGNOSTIC TESTING:  [x ] Echocardiogram: < from: TTE W or WO Ultrasound Enhancing Agent (04.19.24 @ 15:35) >   1. Left ventricular systolic function is low normal with an ejection fraction visually estimated at 50 to 55 %.   2. There is moderate (grade 2) left ventricular diastolic dysfunction.   3. Normal right ventricular cavity size and normal systolic function.   4. A bioprosthetic valve is visualized in the mitral position. Mean transmitral gradient = 5mmHg at 57 bpm.   5. Trace mitral regurgitation.   6. A bioprosthetic valve is visualized in the aortic position. Acceptable gradients in the setting of bioprosthetic valve.   7. No pericardial effusion seen.   8. Estimated pulmonary artery systolic pressure is 26 mmHg.   9. Compared to the transthoracic echocardiogram performed on 4/17/2024, pulmonary pressures and RV volume overload have improved.    < end of copied text >    [x ]  Catheterization:   < from: Cardiac Cath Lab - Adult (11.10.20 @ 07:43) >  Summary: Addendum 11/11/20: Case reconfirmed to remove Bypass Graph Study,  duplicate Left Subclavian procedure,change contrast from 1665 to 165 and  add Inominate Angiography procedure  DIAGNOSTIC IMPRESSIONS: - Patent ascending aortic graft  - Severe 2 vessel CAD with chronically occluded proximal RCA. There are   brisk collaterals from the LCA.  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, subclavian   artery, arch, and descending aorta  - Low normal LV systolic function with moderate to severe AI and MR (best   seen on echo)  - The IMAs are both patent  - Cardiac output 5.1 LPM; Cardiac index 2.5  - Markedly elevated LVEDP =38mmHg    < end of copied text >  [ ] Stress Test:    OTHER: 	                                                                [facility]                                                         CARDIOLOGY AT [facility]                                                                  [address], [place] [address]                                                         Telephone: [phone]. Fax:[phone]                                                                             PROGRESS NOTE    Reason for follow up: NSVT   Update: Pt seen and examined at the bedside. No episodes of NSVT noted on telemetry currently NSR/Sinus bradycardia with HR as low as 49 (nonsustained). HD was initiated yesterday.     Review of symptoms:   Cardiac:  No chest pain. No dyspnea. No palpitations.  Respiratory: no cough. No dyspnea  Gastrointestinal: No diarrhea. No abdominal pain. No bleeding.   Neuro: No focal neuro complaints.    Vitals:  T(C): 36.6 (04-20-24 @ 08:58), Max: 36.8 (04-20-24 @ 00:02)  HR: 49 (04-20-24 @ 08:58) (44 - 60)  BP: 124/58 (04-20-24 @ 08:58) (124/58 - 171/72)  RR: 18 (04-20-24 @ 08:58) (18 - 18)  SpO2: 92% (04-20-24 @ 08:58) (91% - 99%)  Wt(kg): --  I&O's Summary    19 Apr 2024 07:01  -  20 Apr 2024 07:00  --------------------------------------------------------  IN: 660 mL / OUT: 1800 mL / NET: -1140 mL    PHYSICAL EXAM:  Appearance: Comfortable. No acute distress  HEENT:  Atraumatic. Normocephalic.  Normal oral mucosa  Neurologic: A & O x 3, no gross focal deficits.  Cardiovascular: RRR S1 S2, No murmur, no rubs/gallops. No JVD  Respiratory: Lungs clear to auscultation, unlabored   Gastrointestinal:  Soft, Non-tender, + BS  Lower Extremities: 2+ Peripheral Pulses, No clubbing, cyanosis, or edema  Psychiatry: Patient is calm. No agitation.   Skin: warm and dry.    CURRENT CARDIAC MEDICATIONS:  amLODIPine   Tablet 10 milliGRAM(s) Oral daily  carvedilol 3.125 milliGRAM(s) Oral every 12 hours  cloNIDine 0.2 milliGRAM(s) Oral two times a day  hydrALAZINE 50 milliGRAM(s) Oral every 8 hours  hydrALAZINE Injectable 10 milliGRAM(s) IV Push every 4 hours PRN  isosorbide   mononitrate ER Tablet (IMDUR) 30 milliGRAM(s) Oral daily      CURRENT OTHER MEDICATIONS:  acetaminophen     Tablet .. 650 milliGRAM(s) Oral every 6 hours PRN Temp greater or equal to 38C (100.4F), Mild Pain (1 - 3)  ALPRAZolam 0.25 milliGRAM(s) Oral three times a day PRN anxiety  cyclobenzaprine 5 milliGRAM(s) Oral three times a day PRN Muscle Spasm  diphenhydrAMINE 50 milliGRAM(s) Oral at bedtime  levETIRAcetam 500 milliGRAM(s) Oral two times a day  melatonin 3 milliGRAM(s) Oral at bedtime PRN Insomnia  ondansetron Injectable 4 milliGRAM(s) IV Push every 8 hours PRN Nausea and/or Vomiting  aluminum hydroxide/magnesium hydroxide/simethicone Suspension 30 milliLiter(s) Oral every 4 hours PRN Dyspepsia  pantoprazole  Injectable 40 milliGRAM(s) IV Push every 12 hours  atorvastatin 40 milliGRAM(s) Oral at bedtime  chlorhexidine 2% Cloths 1 Application(s) Topical <User Schedule>  epoetin yolanda-epbx (RETACRIT) Injectable 01421 Unit(s) IV Push <User Schedule>  Nephro-jeanie 1 Tablet(s) Oral daily  sodium chloride 0.9% lock flush 10 milliLiter(s) IV Push every 1 hour PRN Pre/post blood products, medications, blood draw, and to maintain line patency  tamsulosin 0.4 milliGRAM(s) Oral at bedtime      LABS:	 	             7.3    5.20  )-----------( 129      ( 19 Apr 2024 05:19 )             22.3     04-19    139  |  106  |  67.2<H>  ----------------------------<  102<H>  3.5   |  20.0<L>  |  6.00<H>    Ca    8.3<L>      19 Apr 2024 05:19  Mg     2.2     04-19      PT/INR/PTT ( 12 Apr 2024 15:45 )                       :                       :      14.6         :       33.1                  .        .                   .              .           .       1.33        .                                       Lipid Profile: Date: 04-13 @ 02:42  Total cholesterol 105; Direct LDL: --; HDL: 46; Triglycerides:85    HgA1c: 5.2%     TELEMETRY: NSR/Sinus bradycardia with HR 49 to 60s   ECG: NSR     DIAGNOSTIC TESTING:  [x ] Echocardiogram: < from: TTE W or WO Ultrasound Enhancing Agent (04.19.24 @ 15:35) >   1. Left ventricular systolic function is low normal with an ejection fraction visually estimated at 50 to 55 %.   2. There is moderate (grade 2) left ventricular diastolic dysfunction.   3. Normal right ventricular cavity size and normal systolic function.   4. A bioprosthetic valve is visualized in the mitral position. Mean transmitral gradient = 5mmHg at 57 bpm.   5. Trace mitral regurgitation.   6. A bioprosthetic valve is visualized in the aortic position. Acceptable gradients in the setting of bioprosthetic valve.   7. No pericardial effusion seen.   8. Estimated pulmonary artery systolic pressure is 26 mmHg.   9. Compared to the transthoracic echocardiogram performed on 4/17/2024, pulmonary pressures and RV volume overload have improved.    < end of copied text >    [x ]  Catheterization:   < from: Cardiac Cath Lab - Adult (11.10.20 @ 07:43) >  Summary: Addendum 11/11/20: Case reconfirmed to remove Bypass Graph Study,  duplicate Left Subclavian procedure,change contrast from 1665 to 165 and  add Inominate Angiography procedure  DIAGNOSTIC IMPRESSIONS: - Patent ascending aortic graft  - Severe 2 vessel CAD with chronically occluded proximal RCA. There are   brisk collaterals from the LCA.  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

## 2024-04-20 NOTE — PROGRESS NOTE ADULT - SUBJECTIVE AND OBJECTIVE BOX
Acute renal failure    HPI:  62-year-old male with h/o HTN, aortic dissection s/p repair, CAD and VHD with prior CABG/bio-AVR/bio-MVR, bowel ischemia treated with resection, ESRD on dialysis (pt no longer on dialysis), AVMs s/p cauterization, atrial fibrillation/flutter with previous RFA, PVCs, sudural/subarachnoid hemorrhage, restarted Eliquis 8/2023, MUSTAPHA performed 9/27/23 with +MV vegetation and source thought to be AV graft which was removed 10/1/23, treated with 6 week A/B course, repeat MUSTAPHA in December 2023 negative for any vegetations. AV fistula thought to be responsible for the infection which has been removed.  He was schedule for BLAINE occlusion/Watchman implant today nd was noted to have SANTOS (Cr 5; baseline ~2) and anemia (Hg 7.1). He denies any bleeding issues or dark stools. Denies HA, fevers, chills, CP, palp, or SOB. He does report some recent balance issues, vertigo and episodes of N/V last night. He also reports chronic loose stools. No blood seen in stool/emesis. Pt is voiding urine w/o any issues. Pt states last emesis last night after he drunk plenty of Gatorade. Pt also drunk 2 beers. No vomiting since came to ed,ambulate to bathroom w/o any dizziness. Pt states he has on/off dizziness last one month.Last dose of eliquis last night. seen by  last week. Pt is found to have sbp 244 at ed. (12 Apr 2024 17:55)    Interval History:  Patient was seen and examined at bedside around 9:15 am.  Feels tired.  Unable sleep at night.   No other complaints.   Bradycardiac on Tele, asymptomatic.     ROS:  As per interval history otherwise unremarkable.    PHYSICAL EXAM:  Vital Signs   T(C): 36.6 (20 Apr 2024 08:58), Max: 36.8 (20 Apr 2024 00:02)  T(F): 97.8 (20 Apr 2024 08:58), Max: 98.3 (20 Apr 2024 00:02)  HR: 49 (20 Apr 2024 08:58) (49 - 60)  BP: 124/58 (20 Apr 2024 08:58) (124/58 - 171/72)  RR: 18 (20 Apr 2024 08:58) (18 - 18)  SpO2: 92% (20 Apr 2024 08:58) (91% - 99%)  Parameters below as of 20 Apr 2024 08:58  Patient On (Oxygen Delivery Method): room air  General: Elderly male sitting in bed comfortably. No acute distress  HEENT: EOMI. Clear conjunctivae. Moist mucus membrane  Neck: Supple.   Chest: Good air entry. No wheezing, rales or rhonchi. Old healed scar. Dialysis catheter in right upper chest.   Heart: Normal S1 & S2. RRR.   Abdomen: Non distended. Soft. Non-tender. + BS. Old healed scar.   Ext: No pedal edema. No calf tenderness   Neuro: Awake and alert. No focal deficit. Speech clear.   Skin: Warm and Dry  Psychiatry: Normal mood and affect    I&O's Summary    19 Apr 2024 07:01  -  20 Apr 2024 07:00  --------------------------------------------------------  IN: 660 mL / OUT: 1800 mL / NET: -1140 mL    LABS:                        7.3    5.20  )-----------( 129      ( 19 Apr 2024 05:19 )             22.3     04-19    139  |  106  |  67.2<H>  ----------------------------<  102<H>  3.5   |  20.0<L>  |  6.00<H>    Ca    8.3<L>      19 Apr 2024 05:19  Phos  3.2     04-18  Mg     2.2     04-19    TPro  6.1<L>  /  Alb  3.4  /  TBili  0.8  /  DBili  x   /  AST  9   /  ALT  8   /  AlkPhos  68  04-18    Urinalysis Basic - ( 19 Apr 2024 05:19 )    Color: x / Appearance: x / SG: x / pH: x  Gluc: 102 mg/dL / Ketone: x  / Bili: x / Urobili: x   Blood: x / Protein: x / Nitrite: x   Leuk Esterase: x / RBC: x / WBC x   Sq Epi: x / Non Sq Epi: x / Bacteria: x    RADIOLOGY & ADDITIONAL STUDIES:  Reviewed     MEDICATIONS  (STANDING):  amLODIPine   Tablet 10 milliGRAM(s) Oral daily  atorvastatin 40 milliGRAM(s) Oral at bedtime  carvedilol 3.125 milliGRAM(s) Oral every 12 hours  chlorhexidine 2% Cloths 1 Application(s) Topical <User Schedule>  cloNIDine 0.2 milliGRAM(s) Oral two times a day  diphenhydrAMINE 50 milliGRAM(s) Oral at bedtime  epoetin yolanda-epbx (RETACRIT) Injectable 34457 Unit(s) IV Push <User Schedule>  hydrALAZINE 50 milliGRAM(s) Oral every 8 hours  isosorbide   mononitrate ER Tablet (IMDUR) 30 milliGRAM(s) Oral daily  levETIRAcetam 500 milliGRAM(s) Oral two times a day  Nephro-jeanie 1 Tablet(s) Oral daily  pantoprazole  Injectable 40 milliGRAM(s) IV Push every 12 hours  tamsulosin 0.4 milliGRAM(s) Oral at bedtime    MEDICATIONS  (PRN):  acetaminophen     Tablet .. 650 milliGRAM(s) Oral every 6 hours PRN Temp greater or equal to 38C (100.4F), Mild Pain (1 - 3)  ALPRAZolam 0.25 milliGRAM(s) Oral three times a day PRN anxiety  aluminum hydroxide/magnesium hydroxide/simethicone Suspension 30 milliLiter(s) Oral every 4 hours PRN Dyspepsia  cyclobenzaprine 5 milliGRAM(s) Oral three times a day PRN Muscle Spasm  diazepam    Tablet 5 milliGRAM(s) Oral at bedtime PRN Insomnia  hydrALAZINE Injectable 10 milliGRAM(s) IV Push every 4 hours PRN sbp>159  melatonin 3 milliGRAM(s) Oral at bedtime PRN Insomnia  ondansetron Injectable 4 milliGRAM(s) IV Push every 8 hours PRN Nausea and/or Vomiting  sodium chloride 0.9% lock flush 10 milliLiter(s) IV Push every 1 hour PRN Pre/post blood products, medications, blood draw, and to maintain line patency

## 2024-04-20 NOTE — PROGRESS NOTE ADULT - NS ATTEND AMEND GEN_ALL_CORE FT
62-year-old male with h/o HTN, aortic dissection s/p repair, CAD and VHD with prior CABG/bio-AVR/bio-MVR (2020), bowel ischemia treated with resection, ESRD on dialysis (pt no longer on dialysis), AVMs s/p cauterization, atrial fibrillation/flutter with previous RFA, PVCs, sudural/subarachnoid hemorrhage, restarted Eliquis 8/2023, MUSTAPHA performed 9/27/23 with +MV vegetation and source thought to be AV graft which was removed 10/1/23, treated with 6 week A/B course, repeat MUSTAPHA in December 2023 negative for any vegetations. AV fistula thought to be responsible for the infection which has been removed.  He was schedule for BLAINE occlusion/Watchman implant on 4/12 but was noted to have SANTOS (Cr 5; baseline ~2) and anemia (Hg 7.1). Last dose of Eliquis 4/12. General cardiology consulted today due to NSVT in the setting of anemia and CKD.    NSVT  Hypertensive urgency  ESRD preparing for HD  anemia acute on chronic  hx of aortic dissection s/p repair  coronary artery disease s/p CABG  valvular heart disease s/p AVR, MVR bioprosthetic 2020  bowel ischemic history  hx of atrial flutter with RFA ablation  known AV fistual infection      scheduled for watchman 4/12 but noted to have SANTOS on CKD and procedure was canceled  chronic anemia    complicated course in september 2023 with endocarditis, 6 weeks of antibiotics, repeat MUSTAPHA without vegetations. AV fistula graft removal.    12 beats of NSVT, asymptomatic.  metabolic derangements are noted  anemia is noted.    hold apixiban at present, consider heparin infusion to trial if bleed.    still pending 2d TTE have discussed with echo department.    he has non-modifiable risks for planned procedures, HD catheter placement, possible GI intervention (colonoscopy, endoscopy). He would be considered elevated risk irrespective. Recommend continued use of AV niki agent, bblocker.
Patient seen and examined by me.      I have discussed my recommendation with the PA which are outlined above.  Will sign off.

## 2024-04-21 PROCEDURE — 99232 SBSQ HOSP IP/OBS MODERATE 35: CPT

## 2024-04-21 RX ORDER — DIAZEPAM 5 MG
10 TABLET ORAL AT BEDTIME
Refills: 0 | Status: DISCONTINUED | OUTPATIENT
Start: 2024-04-21 | End: 2024-04-22

## 2024-04-21 RX ORDER — MAGNESIUM OXIDE 400 MG ORAL TABLET 241.3 MG
400 TABLET ORAL
Refills: 0 | Status: DISCONTINUED | OUTPATIENT
Start: 2024-04-21 | End: 2024-04-22

## 2024-04-21 RX ADMIN — Medication 50 MILLIGRAM(S): at 13:15

## 2024-04-21 RX ADMIN — Medication 0.25 MILLIGRAM(S): at 02:09

## 2024-04-21 RX ADMIN — LEVETIRACETAM 500 MILLIGRAM(S): 250 TABLET, FILM COATED ORAL at 06:18

## 2024-04-21 RX ADMIN — Medication 50 MILLIGRAM(S): at 21:52

## 2024-04-21 RX ADMIN — Medication 3 MILLIGRAM(S): at 21:51

## 2024-04-21 RX ADMIN — LEVETIRACETAM 500 MILLIGRAM(S): 250 TABLET, FILM COATED ORAL at 17:43

## 2024-04-21 RX ADMIN — Medication 10 MILLIGRAM(S): at 22:53

## 2024-04-21 RX ADMIN — AMLODIPINE BESYLATE 10 MILLIGRAM(S): 2.5 TABLET ORAL at 06:17

## 2024-04-21 RX ADMIN — CHLORHEXIDINE GLUCONATE 1 APPLICATION(S): 213 SOLUTION TOPICAL at 06:17

## 2024-04-21 RX ADMIN — PANTOPRAZOLE SODIUM 40 MILLIGRAM(S): 20 TABLET, DELAYED RELEASE ORAL at 17:42

## 2024-04-21 RX ADMIN — PANTOPRAZOLE SODIUM 40 MILLIGRAM(S): 20 TABLET, DELAYED RELEASE ORAL at 06:18

## 2024-04-21 RX ADMIN — MAGNESIUM OXIDE 400 MG ORAL TABLET 400 MILLIGRAM(S): 241.3 TABLET ORAL at 17:43

## 2024-04-21 RX ADMIN — Medication 0.2 MILLIGRAM(S): at 17:43

## 2024-04-21 RX ADMIN — ATORVASTATIN CALCIUM 40 MILLIGRAM(S): 80 TABLET, FILM COATED ORAL at 21:51

## 2024-04-21 RX ADMIN — ISOSORBIDE MONONITRATE 30 MILLIGRAM(S): 60 TABLET, EXTENDED RELEASE ORAL at 13:30

## 2024-04-21 RX ADMIN — Medication 1 TABLET(S): at 13:15

## 2024-04-21 RX ADMIN — Medication 50 MILLIGRAM(S): at 21:51

## 2024-04-21 RX ADMIN — CARVEDILOL PHOSPHATE 3.12 MILLIGRAM(S): 80 CAPSULE, EXTENDED RELEASE ORAL at 06:18

## 2024-04-21 RX ADMIN — Medication 50 MILLIGRAM(S): at 06:17

## 2024-04-21 RX ADMIN — TAMSULOSIN HYDROCHLORIDE 0.4 MILLIGRAM(S): 0.4 CAPSULE ORAL at 21:51

## 2024-04-21 RX ADMIN — Medication 0.2 MILLIGRAM(S): at 06:17

## 2024-04-21 NOTE — PROGRESS NOTE ADULT - ASSESSMENT
63year-old male with h/o seizure, HTN, aortic dissection s/p repair, CAD and VHD with prior CABG/bio-AVR/bio-MVR, bowel ischemia treated with resection, ESRD was on dialysis in past (pt no longer on dialysis), AVMs s/p cauterization, atrial fibrillation/flutter with previous RFA, PVCs, subdural/subarachnoid hemorrhage, restarted Eliquis 8/2023, MUSTAPHA performed 9/27/23 with +MV vegetation and source thought to be AV graft which was removed 10/1/23, treated with 6 week A/B course, repeat MUSTAPHA in December 2023 negative for any vegetations. AV fistula thought to be responsible for the infection which has been removed.  He was schedule for BLAINE occlusion/Watchman implant and was noted to have SANTOS (Cr 5; baseline ~2) and anemia (Hg 7.1). He also reported chronic loose stools. No blood seen in stool/emesis. Pt is voiding urine w/o any issues.  Received 1 U PRBC for anemia, a/c on hold. Now s/p permacath placement for resumption of HD. Cardiology recalled for NSVT and bradycardia at times, repeat echo post HD.    SANTOS on CKD 3 (likely ATN)   hx of prior HD   baseline 1.8-2  s/p IR guided tunnelled dialysis cathter on 4/19  HD as per Nephro   renal following    NSVT  ECHO noted   Telemonitoring   Decreased Coreg to 3.125 mg BID due to bradycardia, now holding as he is still bradycardiac   [facility] cardio follow up noted     Hypertensive urgency  -Continue Clonidine, Hydralazine, Amlodipine and Imdur   -CTH stable    Anemia  ? Anemia chronic disease  -Repeat hb 7.9. last hb DEC 2023 11.1  -s/p Venofer  -Continue Retacrit   s/p 1 U PRBC   GI follow up noted: outpatient colonoscopy     HX afib/flutter  -plan for BLAINE occlusion/Watchman implant which was cancelled  -EP Consult noted   -Continue Coreg  -Eliquis on hold     HX aortic dissection s/p repair, CAD and VHD with prior CABG/bio-AVR/bio-MVR  -Continue home meds  -hold eliquis    hx seizure  -on keppra at home    Insomnia   Continue Benadryl   Melatonin PRN   Patient is asking for trial of Valium. Explained to him about addiction potential. He feels exhausted as he has not slept in days. Wants to have a good night sleep while here.   Trial of Valium if Benadryl and Melatonin don't work.   He is aware that it is only for couple of days while he is here and there will be no prescription on d/c.     DVT Prophylaxis -- Venodyne    Dispo: Home likely next week. Needs dialysis seat in community.

## 2024-04-21 NOTE — PROGRESS NOTE ADULT - SUBJECTIVE AND OBJECTIVE BOX
Acute renal failure    HPI:  62-year-old male with h/o HTN, aortic dissection s/p repair, CAD and VHD with prior CABG/bio-AVR/bio-MVR, bowel ischemia treated with resection, ESRD on dialysis (pt no longer on dialysis), AVMs s/p cauterization, atrial fibrillation/flutter with previous RFA, PVCs, sudural/subarachnoid hemorrhage, restarted Eliquis 8/2023, MUSTAPHA performed 9/27/23 with +MV vegetation and source thought to be AV graft which was removed 10/1/23, treated with 6 week A/B course, repeat MUSTAPHA in December 2023 negative for any vegetations. AV fistula thought to be responsible for the infection which has been removed.  He was schedule for BLAINE occlusion/Watchman implant today nd was noted to have SANTOS (Cr 5; baseline ~2) and anemia (Hg 7.1). He denies any bleeding issues or dark stools. Denies HA, fevers, chills, CP, palp, or SOB. He does report some recent balance issues, vertigo and episodes of N/V last night. He also reports chronic loose stools. No blood seen in stool/emesis. Pt is voiding urine w/o any issues. Pt states last emesis last night after he drunk plenty of Gatorade. Pt also drunk 2 beers. No vomiting since came to ed,ambulate to bathroom w/o any dizziness. Pt states he has on/off dizziness last one month.Last dose of eliquis last night. seen by  last week. Pt is found to have sbp 244 at ed. (12 Apr 2024 17:55)    Interval History:  Patient was seen and examined at bedside around 9:45am.  Sleeping.  Per RN, was having difficulty with sleeping last night.  No other issues.   Has been bradycardiac, asymptomatic.     ROS:  As per interval history otherwise unremarkable.    PHYSICAL EXAM:  Vital Signs   T(C): 36.4 (21 Apr 2024 08:31), Max: 36.7 (20 Apr 2024 18:07)  T(F): 97.5 (21 Apr 2024 08:31), Max: 98 (20 Apr 2024 18:07)  HR: 47 (21 Apr 2024 13:17) (47 - 56)  BP: 128/69 (21 Apr 2024 13:17) (110/47 - 161/72)  BP(mean): 89 (21 Apr 2024 13:17) (89 - 89)  RR: 20 (21 Apr 2024 13:17) (18 - 20)  SpO2: 95% (21 Apr 2024 13:17) (93% - 96%)  Parameters below as of 21 Apr 2024 08:31  Patient On (Oxygen Delivery Method): room air  General: Elderly male lying in bed comfortably. No acute distress  Neck: Supple.   Chest: Good air entry. No wheezing, rales or rhonchi. Old healed scar. Dialysis catheter in right upper chest.   Heart: Normal S1 & S2. RRR.   Abdomen: Non distended. Soft. + BS. Old healed scar.   Ext: No pedal edema. No calf tenderness   Neuro: Sleeping   Skin: Warm and Dry  Psychiatry: Unable to assess     I&O's Summary    20 Apr 2024 07:01  -  21 Apr 2024 07:00  --------------------------------------------------------  IN: 500 mL / OUT: 550 mL / NET: -50 mL    LABS:                        7.3    5.20  )-----------( 129      ( 19 Apr 2024 05:19 )             22.3     04-19    139  |  106  |  67.2<H>  ----------------------------<  102<H>  3.5   |  20.0<L>  |  6.00<H>    Ca    8.3<L>      19 Apr 2024 05:19  Phos  3.2     04-18  Mg     2.2     04-19    TPro  6.1<L>  /  Alb  3.4  /  TBili  0.8  /  DBili  x   /  AST  9   /  ALT  8   /  AlkPhos  68  04-18    Urinalysis Basic - ( 19 Apr 2024 05:19 )    Color: x / Appearance: x / SG: x / pH: x  Gluc: 102 mg/dL / Ketone: x  / Bili: x / Urobili: x   Blood: x / Protein: x / Nitrite: x   Leuk Esterase: x / RBC: x / WBC x   Sq Epi: x / Non Sq Epi: x / Bacteria: x    RADIOLOGY & ADDITIONAL STUDIES:  Reviewed     MEDICATIONS  (STANDING):  amLODIPine   Tablet 10 milliGRAM(s) Oral daily  atorvastatin 40 milliGRAM(s) Oral at bedtime  chlorhexidine 2% Cloths 1 Application(s) Topical <User Schedule>  cloNIDine 0.2 milliGRAM(s) Oral two times a day  diphenhydrAMINE 50 milliGRAM(s) Oral at bedtime  epoetin yolanda-epbx (RETACRIT) Injectable 91988 Unit(s) IV Push <User Schedule>  hydrALAZINE 50 milliGRAM(s) Oral every 8 hours  isosorbide   mononitrate ER Tablet (IMDUR) 30 milliGRAM(s) Oral daily  levETIRAcetam 500 milliGRAM(s) Oral two times a day  magnesium oxide 400 milliGRAM(s) Oral three times a day with meals  Nephro-jeanie 1 Tablet(s) Oral daily  pantoprazole  Injectable 40 milliGRAM(s) IV Push every 12 hours  tamsulosin 0.4 milliGRAM(s) Oral at bedtime    MEDICATIONS  (PRN):  acetaminophen     Tablet .. 650 milliGRAM(s) Oral every 6 hours PRN Temp greater or equal to 38C (100.4F), Mild Pain (1 - 3)  ALPRAZolam 0.25 milliGRAM(s) Oral three times a day PRN anxiety  aluminum hydroxide/magnesium hydroxide/simethicone Suspension 30 milliLiter(s) Oral every 4 hours PRN Dyspepsia  cyclobenzaprine 5 milliGRAM(s) Oral three times a day PRN Muscle Spasm  diazepam    Tablet 5 milliGRAM(s) Oral at bedtime PRN Insomnia  hydrALAZINE Injectable 10 milliGRAM(s) IV Push every 4 hours PRN sbp>159  melatonin 3 milliGRAM(s) Oral at bedtime PRN Insomnia  ondansetron Injectable 4 milliGRAM(s) IV Push every 8 hours PRN Nausea and/or Vomiting  sodium chloride 0.9% lock flush 10 milliLiter(s) IV Push every 1 hour PRN Pre/post blood products, medications, blood draw, and to maintain line patency

## 2024-04-21 NOTE — PROGRESS NOTE ADULT - SUBJECTIVE AND OBJECTIVE BOX
Patient seen and examined    I&O's Summary    20 Apr 2024 07:01  -  21 Apr 2024 07:00  --------------------------------------------------------  IN: 500 mL / OUT: 550 mL / NET: -50 mL        REVIEW OF SYSTEMS:    CONSTITUTIONAL: No F/C  RESPIRATORY: No cough,  No SOB  CARDIOVASCULAR: No CP/palpitations,    GASTROINTESTINAL: No abdominal pain  or NVD   GENITOURINARY: No UTI sx  NEUROLOGICAL: No headaches, NO wk/numbness  MUSCULOSKELETAL:   EXTREMITIES : no swelling,    Vital Signs Last 24 Hrs  T(C): 36.6 (21 Apr 2024 16:11), Max: 36.7 (20 Apr 2024 20:41)  T(F): 97.8 (21 Apr 2024 16:11), Max: 98 (20 Apr 2024 20:41)  HR: 49 (21 Apr 2024 16:11) (47 - 56)  BP: 115/62 (21 Apr 2024 16:11) (110/47 - 161/72)  BP(mean): 89 (21 Apr 2024 13:17) (89 - 89)  RR: 20 (21 Apr 2024 16:11) (18 - 20)  SpO2: 93% (21 Apr 2024 16:11) (93% - 95%)    Parameters below as of 21 Apr 2024 16:11  Patient On (Oxygen Delivery Method): room air        PHYSICAL EXAM:    GENERAL: NAD,   EYES:  conjunctiva and sclera clear  NECK: Supple, No JVD/Bruit  NERVOUS SYSTEM:  A/O x3,   CHEST:  No rales or rhonchi  HEART:  RRR, No murmur  ABDOMEN: Soft, NT/ND BS+  EXTREMITIES:  No Edema;  SKIN: No rashes    LABS:  No new labs today    MEDICATIONS  (STANDING):  acetaminophen     Tablet .. PRN  ALPRAZolam PRN  aluminum hydroxide/magnesium hydroxide/simethicone Suspension PRN  amLODIPine   Tablet  atorvastatin  chlorhexidine 2% Cloths  cloNIDine  cyclobenzaprine PRN  diazepam    Tablet PRN  diphenhydrAMINE  epoetin yolanda-epbx (RETACRIT) Injectable  hydrALAZINE  hydrALAZINE Injectable PRN  isosorbide   mononitrate ER Tablet (IMDUR)  levETIRAcetam  magnesium oxide  melatonin PRN  Nephro-jeanie  ondansetron Injectable PRN  pantoprazole  Injectable  sodium chloride 0.9% lock flush PRN  tamsulosin

## 2024-04-21 NOTE — PROGRESS NOTE ADULT - ASSESSMENT
62 yo M w h/o HTN, aortic dissection s/p repair, CAD and VHD with prior CABG/bio-AVR/bio-MVR, bowel ischemia treated with resection, ESRD on dialysis (pt no longer on dialysis), AVMs s/p cauterization, atrial fibrillation/flutter with previous RFA, PVCs, sudural/subarachnoid hemorrhage, restarted Eliquis 8/2023, MUSTAPHA performed 9/27/23 with +MV vegetation and source thought to be AV graft which was removed 10/1/23, treated with 6 week A/B course, repeat MUSTAPHA in December 2023 negative for any vegetations. AV fistula thought to be responsible for the infection which has been removed.  He was schedule for BLAINE occlusion/Watchman implant today nd was noted to have SANTOS (Cr 5; baseline ~2) and anemia (Hg 7.1). He denies any bleeding issues or dark stools.    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---> Needs follow up   MUSTAPHA 12/23 noted - LV function normal     SANTOS on CKD III  Serum Cr 1.7 - 1/202  >Rising Cr 4.7--> 5.4--> 5.7 --> 6.1 --> 6.2 - now started on HD but had to be taken off 2/2 cramps; HD am again , will not take off much fluid  Admits to taking ADVIL PM every night --> possible interstitial nephritis + Torsemide every day   Renal US noted no hydronephrosis  s/p trial of IVF at inc rate no improvement in renal function  Serg HD when seen but later had to be taken off early  Has good UO    Anemia - significan - on REINALDO ; r/o GIB  SW assistance pt need HD chair in the community   Use to go to [facility] in [place] he lives near there would like to go back  Needs Hep panel and ppd    HTN  BP ok on current regimen    Anemia should check FOBT w NSAID use hx--> s/p transfusion   Will restart Retacrit 20 K units one dose given 4/15  Low iron stores on  IV Venofer    Monitor labs

## 2024-04-22 ENCOUNTER — TRANSCRIPTION ENCOUNTER (OUTPATIENT)
Age: 64
End: 2024-04-22

## 2024-04-22 VITALS — DIASTOLIC BLOOD PRESSURE: 70 MMHG | SYSTOLIC BLOOD PRESSURE: 156 MMHG

## 2024-04-22 LAB
ANION GAP SERPL CALC-SCNC: 13 MMOL/L — SIGNIFICANT CHANGE UP (ref 5–17)
BUN SERPL-MCNC: 49.1 MG/DL — HIGH (ref 8–20)
CALCIUM SERPL-MCNC: 8.3 MG/DL — LOW (ref 8.4–10.5)
CHLORIDE SERPL-SCNC: 103 MMOL/L — SIGNIFICANT CHANGE UP (ref 96–108)
CO2 SERPL-SCNC: 23 MMOL/L — SIGNIFICANT CHANGE UP (ref 22–29)
CREAT SERPL-MCNC: 5.79 MG/DL — HIGH (ref 0.5–1.3)
EGFR: 10 ML/MIN/1.73M2 — LOW
GLUCOSE SERPL-MCNC: 102 MG/DL — HIGH (ref 70–99)
HCT VFR BLD CALC: 23.1 % — LOW (ref 39–50)
HGB BLD-MCNC: 7.5 G/DL — LOW (ref 13–17)
MAGNESIUM SERPL-MCNC: 1.9 MG/DL — SIGNIFICANT CHANGE UP (ref 1.6–2.6)
MCHC RBC-ENTMCNC: 29.8 PG — SIGNIFICANT CHANGE UP (ref 27–34)
MCHC RBC-ENTMCNC: 32.5 GM/DL — SIGNIFICANT CHANGE UP (ref 32–36)
MCV RBC AUTO: 91.7 FL — SIGNIFICANT CHANGE UP (ref 80–100)
PHOSPHATE SERPL-MCNC: 4 MG/DL — SIGNIFICANT CHANGE UP (ref 2.4–4.7)
PLATELET # BLD AUTO: 144 K/UL — LOW (ref 150–400)
POTASSIUM SERPL-MCNC: 3.5 MMOL/L — SIGNIFICANT CHANGE UP (ref 3.5–5.3)
POTASSIUM SERPL-SCNC: 3.5 MMOL/L — SIGNIFICANT CHANGE UP (ref 3.5–5.3)
RBC # BLD: 2.52 M/UL — LOW (ref 4.2–5.8)
RBC # FLD: 14.1 % — SIGNIFICANT CHANGE UP (ref 10.3–14.5)
SODIUM SERPL-SCNC: 139 MMOL/L — SIGNIFICANT CHANGE UP (ref 135–145)
WBC # BLD: 6.02 K/UL — SIGNIFICANT CHANGE UP (ref 3.8–10.5)
WBC # FLD AUTO: 6.02 K/UL — SIGNIFICANT CHANGE UP (ref 3.8–10.5)

## 2024-04-22 PROCEDURE — 87641 MR-STAPH DNA AMP PROBE: CPT

## 2024-04-22 PROCEDURE — 80048 BASIC METABOLIC PNL TOTAL CA: CPT

## 2024-04-22 PROCEDURE — 83735 ASSAY OF MAGNESIUM: CPT

## 2024-04-22 PROCEDURE — 76775 US EXAM ABDO BACK WALL LIM: CPT

## 2024-04-22 PROCEDURE — 86900 BLOOD TYPING SEROLOGIC ABO: CPT

## 2024-04-22 PROCEDURE — 99239 HOSP IP/OBS DSCHRG MGMT >30: CPT

## 2024-04-22 PROCEDURE — 83690 ASSAY OF LIPASE: CPT

## 2024-04-22 PROCEDURE — 86706 HEP B SURFACE ANTIBODY: CPT

## 2024-04-22 PROCEDURE — 85027 COMPLETE CBC AUTOMATED: CPT

## 2024-04-22 PROCEDURE — 85025 COMPLETE CBC W/AUTO DIFF WBC: CPT

## 2024-04-22 PROCEDURE — 71045 X-RAY EXAM CHEST 1 VIEW: CPT

## 2024-04-22 PROCEDURE — 93306 TTE W/DOPPLER COMPLETE: CPT

## 2024-04-22 PROCEDURE — 82728 ASSAY OF FERRITIN: CPT

## 2024-04-22 PROCEDURE — 77001 FLUOROGUIDE FOR VEIN DEVICE: CPT

## 2024-04-22 PROCEDURE — 70450 CT HEAD/BRAIN W/O DYE: CPT | Mod: MC

## 2024-04-22 PROCEDURE — 99285 EMERGENCY DEPT VISIT HI MDM: CPT

## 2024-04-22 PROCEDURE — P9016: CPT

## 2024-04-22 PROCEDURE — 93970 EXTREMITY STUDY: CPT

## 2024-04-22 PROCEDURE — 84300 ASSAY OF URINE SODIUM: CPT

## 2024-04-22 PROCEDURE — C1769: CPT

## 2024-04-22 PROCEDURE — 80307 DRUG TEST PRSMV CHEM ANLYZR: CPT

## 2024-04-22 PROCEDURE — C1750: CPT

## 2024-04-22 PROCEDURE — 84100 ASSAY OF PHOSPHORUS: CPT

## 2024-04-22 PROCEDURE — 80061 LIPID PANEL: CPT

## 2024-04-22 PROCEDURE — 80053 COMPREHEN METABOLIC PANEL: CPT

## 2024-04-22 PROCEDURE — 36430 TRANSFUSION BLD/BLD COMPNT: CPT

## 2024-04-22 PROCEDURE — 83540 ASSAY OF IRON: CPT

## 2024-04-22 PROCEDURE — 76942 ECHO GUIDE FOR BIOPSY: CPT

## 2024-04-22 PROCEDURE — 86922 COMPATIBILITY TEST ANTIGLOB: CPT

## 2024-04-22 PROCEDURE — 85610 PROTHROMBIN TIME: CPT

## 2024-04-22 PROCEDURE — 83970 ASSAY OF PARATHORMONE: CPT

## 2024-04-22 PROCEDURE — 36415 COLL VENOUS BLD VENIPUNCTURE: CPT

## 2024-04-22 PROCEDURE — 93005 ELECTROCARDIOGRAM TRACING: CPT

## 2024-04-22 PROCEDURE — 85730 THROMBOPLASTIN TIME PARTIAL: CPT

## 2024-04-22 PROCEDURE — 84466 ASSAY OF TRANSFERRIN: CPT

## 2024-04-22 PROCEDURE — 87640 STAPH A DNA AMP PROBE: CPT

## 2024-04-22 PROCEDURE — 83550 IRON BINDING TEST: CPT

## 2024-04-22 PROCEDURE — 99261: CPT

## 2024-04-22 PROCEDURE — 86901 BLOOD TYPING SEROLOGIC RH(D): CPT

## 2024-04-22 PROCEDURE — 82310 ASSAY OF CALCIUM: CPT

## 2024-04-22 PROCEDURE — 86850 RBC ANTIBODY SCREEN: CPT

## 2024-04-22 PROCEDURE — 80074 ACUTE HEPATITIS PANEL: CPT

## 2024-04-22 RX ORDER — HYDRALAZINE HCL 50 MG
1 TABLET ORAL
Qty: 90 | Refills: 0
Start: 2024-04-22 | End: 2024-05-21

## 2024-04-22 RX ORDER — HYDRALAZINE HCL 50 MG
1 TABLET ORAL
Qty: 90 | Refills: 0 | DISCHARGE
Start: 2024-04-22 | End: 2024-05-21

## 2024-04-22 RX ORDER — PANTOPRAZOLE SODIUM 20 MG/1
1 TABLET, DELAYED RELEASE ORAL
Qty: 30 | Refills: 0
Start: 2024-04-22 | End: 2024-05-21

## 2024-04-22 RX ADMIN — ERYTHROPOIETIN 10000 UNIT(S): 10000 INJECTION, SOLUTION INTRAVENOUS; SUBCUTANEOUS at 09:54

## 2024-04-22 RX ADMIN — PANTOPRAZOLE SODIUM 40 MILLIGRAM(S): 20 TABLET, DELAYED RELEASE ORAL at 06:22

## 2024-04-22 RX ADMIN — MAGNESIUM OXIDE 400 MG ORAL TABLET 400 MILLIGRAM(S): 241.3 TABLET ORAL at 12:20

## 2024-04-22 RX ADMIN — AMLODIPINE BESYLATE 10 MILLIGRAM(S): 2.5 TABLET ORAL at 12:20

## 2024-04-22 RX ADMIN — ISOSORBIDE MONONITRATE 30 MILLIGRAM(S): 60 TABLET, EXTENDED RELEASE ORAL at 12:20

## 2024-04-22 RX ADMIN — LEVETIRACETAM 500 MILLIGRAM(S): 250 TABLET, FILM COATED ORAL at 06:22

## 2024-04-22 RX ADMIN — Medication 1 TABLET(S): at 12:20

## 2024-04-22 RX ADMIN — Medication 50 MILLIGRAM(S): at 12:59

## 2024-04-22 RX ADMIN — CHLORHEXIDINE GLUCONATE 1 APPLICATION(S): 213 SOLUTION TOPICAL at 06:22

## 2024-04-22 NOTE — PROGRESS NOTE ADULT - PROVIDER SPECIALTY LIST ADULT
Hospitalist
Internal Medicine
Nephrology
Internal Medicine
Nephrology
Nephrology
Gastroenterology
Internal Medicine
Internal Medicine
Nephrology
Hospitalist
Nephrology
Hospitalist
Cardiology
Cardiology

## 2024-04-22 NOTE — PROGRESS NOTE ADULT - SUBJECTIVE AND OBJECTIVE BOX
Patient seen and examined    Feels fine, On HD now  no c/o CP SOB NV   No swelling feet    Vital Signs Last 24 Hrs  T(C): 36.4 (22 Apr 2024 12:18), Max: 36.6 (22 Apr 2024 04:31)  T(F): 97.6 (22 Apr 2024 12:18), Max: 97.9 (22 Apr 2024 04:31)  HR: 66 (22 Apr 2024 12:18) (54 - 69)  BP: 156/70 (22 Apr 2024 12:57) (127/68 - 169/72)  BP(mean): --  RR: 18 (22 Apr 2024 12:18) (16 - 18)  SpO2: 98% (22 Apr 2024 12:18) (92% - 99%)    Parameters below as of 22 Apr 2024 12:18  Patient On (Oxygen Delivery Method): room air        PHYSICAL EXAM    GENERAL: NAD,   EYES:  conjunctiva and sclera clear  NECK: Supple, No JVD/Bruit  NERVOUS SYSTEM:  A/O x3,   CHEST:  No rales, No rhonchi  HEART:  RRR, No murmur  ABDOMEN: Soft, NT/ND BS+  EXTREMITIES:  No Edema;  SKIN: No rashes    22 Apr 2024 04:38    139    |  103    |  49.1   ----------------------------<  102    3.5     |  23.0   |  5.79     Ca    8.3        22 Apr 2024 04:38  Phos  4.0       22 Apr 2024 04:38  Mg     1.9       22 Apr 2024 04:38                            7.5    6.02  )-----------( 144      ( 22 Apr 2024 04:38 )             23.1         Continue present treatment

## 2024-04-22 NOTE — DISCHARGE NOTE NURSING/CASE MANAGEMENT/SOCIAL WORK - NSDCVIVACCINE_GEN_ALL_CORE_FT
influenza, injectable, quadrivalent, preservative free; 12-Dec-2020 09:28; Lynn Gerardo); Package Concierge; [id number] (Exp. Date: 30-Jun-2021); IntraMuscular; Deltoid Right.; 0.5 milliLiter(s); VIS (VIS Published: 15-Aug-2019, VIS Presented: 12-Dec-2020);

## 2024-04-22 NOTE — PROGRESS NOTE ADULT - ASSESSMENT
62 yo M w h/o HTN, aortic dissection s/p repair, CAD and VHD with prior CABG/bio-AVR/bio-MVR, bowel ischemia treated with resection, ESRD on dialysis (pt no longer on dialysis), AVMs s/p cauterization, atrial fibrillation/flutter with previous RFA, PVCs, sudural/subarachnoid hemorrhage, restarted Eliquis 8/2023, MUSTAPHA performed 9/27/23 with +MV vegetation and source thought to be AV graft which was removed 10/1/23, treated with 6 week A/B course, repeat MUSTAPHA in December 2023 negative for any vegetations. AV fistula thought to be responsible for the infection which has been removed.  He was schedule for BLAINE occlusion/Watchman implant today nd was noted to have SANTOS (Cr 5; baseline ~2) and anemia (Hg 7.1). He denies any bleeding issues or dark stools.    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---> Needs follow up   MUSTAPHA 12/23 noted - LV function normal     SANTOS on CKD III  Serum Cr 1.7 - 1/202  >Rising Cr 4.7--> 5.4--> 5.7 --> 6.1 --> 6.2 - now started on HD but had to be taken off 2/2 cramps; HD am again , will not take off much fluid  Admits to taking ADVIL PM every night --> possible interstitial nephritis + Torsemide every day   Renal US noted no hydronephrosis  s/p trial of IVF at inc rate no improvement in renal function  Serg HD well, no cramps - no fluid removal as has good UO      Anemia - significan - on REINALDO ; r/o GIB  SW assistance pt need HD chair in the community   [facility] [place] he lives near there would like to go back  Needs Hep panel and ppd    HTN  BP ok on current regimen    Anemia should check FOBT w NSAID use hx--> s/p transfusion   Will restart Retacrit 20 K units one dose given 4/15  Low iron stores on  IV Venofer    Monitor labs

## 2024-04-22 NOTE — DISCHARGE NOTE PROVIDER - NSDCFUSCHEDAPPT_GEN_ALL_CORE_FT
Prasanna Crawford  [facility] Physician [facility]  VASCULAR [address]  Scheduled Appointment: 04/23/2024    Brianne To  [facility]  CARDIOLOGY [address]  Scheduled Appointment: 05/01/2024

## 2024-04-22 NOTE — DISCHARGE NOTE PROVIDER - NSDCMRMEDTOKEN_GEN_ALL_CORE_FT
amLODIPine 10 mg oral tablet: 1 tab(s) orally once a day  apixaban 5 mg oral tablet: 1 tab(s) orally every 12 hours  atorvastatin 40 mg oral tablet: 1 tab(s) orally once a day (at bedtime)  carvedilol 6.25 mg oral tablet: 0.5 tab(s) orally every 12 hours  cloNIDine 0.2 mg oral tablet: 1 tab(s) orally 2 times a day  hydrALAZINE 50 mg oral tablet: 1 tab(s) orally every 8 hours  isosorbide mononitrate 30 mg oral tablet, extended release: 1 tab(s) orally once a day  levETIRAcetam 1000 mg oral tablet: 1 tab(s) orally 2 times a day  [place]ot 2.5 mg-0.025 mg oral tablet: 2 tab(s) orally 4 times a day as needed  Nephro-Russel oral tablet: 1 tab(s) orally once a day  pantoprazole 40 mg oral delayed release tablet: 1 tab(s) orally once a day  tamsulosin 0.4 mg oral capsule: 1 cap(s) orally once a day (at bedtime)

## 2024-04-22 NOTE — DISCHARGE NOTE NURSING/CASE MANAGEMENT/SOCIAL WORK - PATIENT PORTAL LINK FT
You can access the FollowMyHealth Patient Portal offered by [facility] by registering at the following website: [email or web address]. By joining [facility]’s FollowMyHealth portal, you will also be able to view your health information using other applications (apps) compatible with our system.

## 2024-04-22 NOTE — DISCHARGE NOTE PROVIDER - PROVIDER TOKENS
PROVIDER:[TOKEN:[[id number]],SCHEDULEDAPPT:[04/23/2024],ESTABLISHEDPATIENT:[T]],PROVIDER:[[id number]],SCHEDULEDAPPT:[04/24/2024],ESTABLISHEDPATIENT:[T]],PROVIDER:[TOKEN:[[id number]],FOLLOWUP:[1 week],ESTABLISHEDPATIENT:[T]],PROVIDER:[TOKEN:[[id number]],FOLLOWUP:[1 week],ESTABLISHEDPATIENT:[T]],PROVIDER:[TOKEN:[[id number]],FOLLOWUP:[1 week],ESTABLISHEDPATIENT:[T]],PROVIDER:[TOKEN:[[id number]],FOLLOWUP:[1 week],ESTABLISHEDPATIENT:[T]]

## 2024-04-22 NOTE — DISCHARGE NOTE PROVIDER - NSDCCPCAREPLAN_GEN_ALL_CORE_FT
PRINCIPAL DISCHARGE DIAGNOSIS  Diagnosis: Acute kidney injury superimposed on CKD  Assessment and Plan of Treatment: Started on dialysis, continue as scheduled.  Follow up with Nephro.      SECONDARY DISCHARGE DIAGNOSES  Diagnosis: Anemia  Assessment and Plan of Treatment: Acute on Chronic.  s/p 1 PRBC.  s/p Venofer.   Continue Retacrti with dialysis.   Follow up with PMD.

## 2024-04-22 NOTE — DISCHARGE NOTE PROVIDER - ATTENDING DISCHARGE PHYSICAL EXAMINATION:
Vital Signs   T(C): 36.5 (22 Apr 2024 11:00), Max: 36.6 (21 Apr 2024 16:11)  T(F): 97.7 (22 Apr 2024 11:00), Max: 97.9 (22 Apr 2024 04:31)  HR: 55 (22 Apr 2024 11:00) (47 - 69)  BP: 164/72 (22 Apr 2024 11:00) (115/62 - 164/72)  BP(mean): 89 (21 Apr 2024 13:17) (89 - 89)  RR: 18 (22 Apr 2024 11:00) (16 - 20)  SpO2: 97% (22 Apr 2024 11:00) (92% - 99%)  Parameters below as of 22 Apr 2024 11:00  Patient On (Oxygen Delivery Method): room air  General: Elderly male lying in bed comfortably. No acute distress  HEENT: EOMI. Clear conjunctivae. Moist mucus membrane  Neck: Supple.   Chest: Good air entry. No wheezing, rales or rhonchi. Old healed scar. Dialysis catheter in right upper chest.   Heart: S1 & S2 with bradycardia.   Abdomen: Non distended. Soft. Non-tender. + BS. Old healed scar.   Ext: No pedal edema. No calf tenderness   Neuro: Awake and alert. No focal deficit. Speech clear.   Skin: Warm and Dry  Psychiatry: Normal mood and affect

## 2024-04-22 NOTE — DISCHARGE NOTE PROVIDER - CARE PROVIDER_API CALL
Prasanna Crawford  Vascular Surgery  [address]  [place] [address]  Phone: [phone]  Fax: [phone]  Established Patient  Scheduled Appointment: 04/23/2024    Dago Reyes  Nephrology  [address]  [place] [address]  Phone: [phone]  Fax: [phone]  Established Patient  Scheduled Appointment: 04/24/2024    Felice Frankel  Interventional Cardiology  [address], [address]  [place] [address]  Phone: [phone]  Fax: [phone]  Established Patient  Follow Up Time: 1 week    James Trevino  Cardiac Electrophysiology  [address], [address]  [place] [address]  Phone: [phone]  Fax: [phone]  Established Patient  Follow Up Time: 1 week    [place] [address]  Phone: [phone]  Fax: [phone]  Established Patient  Follow Up Time: 1 week    Lacie Garzon  Gastroenterology  [address], [address]  [place] [address]  Phone: [phone]  Fax: [phone]  Established Patient  Follow Up Time: 1 week

## 2024-04-22 NOTE — DISCHARGE NOTE PROVIDER - CARE PROVIDERS DIRECT ADDRESSES
,[email or web address]

## 2024-04-22 NOTE — PROGRESS NOTE ADULT - REASON FOR ADMISSION
anemia,acute renal failure

## 2024-04-23 ENCOUNTER — APPOINTMENT (OUTPATIENT)
Dept: VASCULAR SURGERY | Facility: CLINIC | Age: 64
End: 2024-04-23

## 2024-04-26 NOTE — DIETITIAN INITIAL EVALUATION ADULT. - WEIGHT IN KG
Pt called in stating that she is having trouble breathing, and last time she had this it was bronchitis.   She is having SHOB started this morning.   States she felt fine yesterday.    90.7

## 2024-05-08 ENCOUNTER — APPOINTMENT (OUTPATIENT)
Dept: ELECTROPHYSIOLOGY | Facility: CLINIC | Age: 64
End: 2024-05-08

## 2024-05-09 ENCOUNTER — APPOINTMENT (OUTPATIENT)
Dept: CARDIOLOGY | Facility: CLINIC | Age: 64
End: 2024-05-09
Payer: MEDICARE

## 2024-05-09 VITALS
HEIGHT: 68 IN | HEART RATE: 63 BPM | DIASTOLIC BLOOD PRESSURE: 60 MMHG | OXYGEN SATURATION: 98 % | SYSTOLIC BLOOD PRESSURE: 110 MMHG | TEMPERATURE: 99.3 F | WEIGHT: 164.45 LBS | BODY MASS INDEX: 24.92 KG/M2

## 2024-05-09 DIAGNOSIS — I25.118 ATHEROSCLEROTIC HEART DISEASE OF NATIVE CORONARY ARTERY WITH OTHER FORMS OF ANGINA PECTORIS: ICD-10-CM

## 2024-05-09 PROCEDURE — 99215 OFFICE O/P EST HI 40 MIN: CPT

## 2024-05-09 PROCEDURE — 93000 ELECTROCARDIOGRAM COMPLETE: CPT

## 2024-05-09 RX ORDER — TORSEMIDE 20 MG/1
20 TABLET ORAL
Qty: 90 | Refills: 1 | Status: ACTIVE | COMMUNITY

## 2024-05-09 RX ORDER — AZITHROMYCIN 500 MG/1
500 TABLET, FILM COATED ORAL
Qty: 1 | Refills: 2 | Status: ACTIVE | COMMUNITY
Start: 2024-05-09 | End: 1900-01-01

## 2024-05-09 RX ORDER — APIXABAN 5 MG/1
5 TABLET, FILM COATED ORAL
Qty: 180 | Refills: 2 | Status: DISCONTINUED | COMMUNITY
Start: 2021-04-13 | End: 2024-05-09

## 2024-05-09 RX ORDER — PANTOPRAZOLE 40 MG/1
40 TABLET, DELAYED RELEASE ORAL DAILY
Qty: 30 | Refills: 1 | Status: ACTIVE | COMMUNITY
Start: 2024-05-09

## 2024-05-09 RX ORDER — POTASSIUM CHLORIDE 10 MEQ
10 CAPSULE, EXTENDED RELEASE ORAL
Refills: 0 | Status: DISCONTINUED | COMMUNITY
End: 2024-05-09

## 2024-05-09 RX ORDER — FOLIC ACID/VIT B COMPLEX AND C 0.8 MG
TABLET ORAL
Refills: 0 | Status: ACTIVE | COMMUNITY
Start: 2024-05-09

## 2024-05-09 RX ORDER — HYDRALAZINE HYDROCHLORIDE 50 MG/1
50 TABLET ORAL TWICE DAILY
Qty: 180 | Refills: 1 | Status: ACTIVE | COMMUNITY
Start: 2024-05-09 | End: 1900-01-01

## 2024-05-09 RX ORDER — CLONIDINE HYDROCHLORIDE 0.3 MG/1
0.3 TABLET ORAL TWICE DAILY
Qty: 180 | Refills: 1 | Status: ACTIVE | COMMUNITY
Start: 2023-12-19 | End: 1900-01-01

## 2024-05-09 RX ORDER — CALCIUM CARBONATE 500(1250)
500 TABLET ORAL
Refills: 0 | Status: DISCONTINUED | COMMUNITY
End: 2024-05-09

## 2024-05-14 ENCOUNTER — APPOINTMENT (OUTPATIENT)
Dept: ELECTROPHYSIOLOGY | Facility: CLINIC | Age: 64
End: 2024-05-14

## 2024-05-15 ENCOUNTER — APPOINTMENT (OUTPATIENT)
Dept: GASTROENTEROLOGY | Facility: CLINIC | Age: 64
End: 2024-05-15
Payer: MEDICARE

## 2024-05-15 VITALS
TEMPERATURE: 97.5 F | HEIGHT: 68 IN | BODY MASS INDEX: 25.01 KG/M2 | RESPIRATION RATE: 14 BRPM | WEIGHT: 165 LBS | OXYGEN SATURATION: 97 % | HEART RATE: 54 BPM | DIASTOLIC BLOOD PRESSURE: 86 MMHG | SYSTOLIC BLOOD PRESSURE: 208 MMHG

## 2024-05-15 DIAGNOSIS — I77.9 DISORDER OF ARTERIES AND ARTERIOLES, UNSPECIFIED: ICD-10-CM

## 2024-05-15 DIAGNOSIS — K91.30 POSTPROCEDURAL INTESTINAL OBSTRUCTION, UNSPECIFIED AS TO PARTIAL VERSUS COMPLETE: ICD-10-CM

## 2024-05-15 DIAGNOSIS — Z98.890 OTHER SPECIFIED POSTPROCEDURAL STATES: ICD-10-CM

## 2024-05-15 DIAGNOSIS — Z86.79 OTHER SPECIFIED POSTPROCEDURAL STATES: ICD-10-CM

## 2024-05-15 DIAGNOSIS — I38 ENDOCARDITIS, VALVE UNSPECIFIED: ICD-10-CM

## 2024-05-15 DIAGNOSIS — L40.9 PSORIASIS, UNSPECIFIED: ICD-10-CM

## 2024-05-15 DIAGNOSIS — I42.9 CARDIOMYOPATHY, UNSPECIFIED: ICD-10-CM

## 2024-05-15 DIAGNOSIS — E78.5 HYPERLIPIDEMIA, UNSPECIFIED: ICD-10-CM

## 2024-05-15 DIAGNOSIS — K52.9 NONINFECTIVE GASTROENTERITIS AND COLITIS, UNSPECIFIED: ICD-10-CM

## 2024-05-15 DIAGNOSIS — I70.1 ATHEROSCLEROSIS OF RENAL ARTERY: ICD-10-CM

## 2024-05-15 DIAGNOSIS — I49.3 VENTRICULAR PREMATURE DEPOLARIZATION: ICD-10-CM

## 2024-05-15 DIAGNOSIS — I71.00 DISSECTION OF UNSPECIFIED SITE OF AORTA: ICD-10-CM

## 2024-05-15 DIAGNOSIS — I10 ESSENTIAL (PRIMARY) HYPERTENSION: ICD-10-CM

## 2024-05-15 DIAGNOSIS — I48.91 UNSPECIFIED ATRIAL FIBRILLATION: ICD-10-CM

## 2024-05-15 DIAGNOSIS — S06.5XAA TRAUMATIC SUBDURAL HEMORRHAGE WITH LOSS OF CONSCIOUSNESS STATUS UNKNOWN, INITIAL ENCOUNTER: ICD-10-CM

## 2024-05-15 DIAGNOSIS — Z95.2 PRESENCE OF PROSTHETIC HEART VALVE: ICD-10-CM

## 2024-05-15 DIAGNOSIS — T82.7XXA INFECTION AND INFLAMMATORY REACTION DUE TO OTHER CARDIAC AND VASCULAR DEVICES, IMPLANTS AND GRAFTS, INITIAL ENCOUNTER: ICD-10-CM

## 2024-05-15 DIAGNOSIS — I05.8 OTHER RHEUMATIC MITRAL VALVE DISEASES: ICD-10-CM

## 2024-05-15 DIAGNOSIS — R56.9 UNSPECIFIED CONVULSIONS: ICD-10-CM

## 2024-05-15 DIAGNOSIS — D64.9 ANEMIA, UNSPECIFIED: ICD-10-CM

## 2024-05-15 PROCEDURE — 99215 OFFICE O/P EST HI 40 MIN: CPT

## 2024-05-15 NOTE — PHYSICAL EXAM
[Alert] : alert [Normal Voice/Communication] : normal voice/communication [No Acute Distress] : no acute distress [Sclera] : the sclera and conjunctiva were normal [Hearing Threshold Finger Rub Not [place]] : hearing was normal [Normal Lips/Gums] : the lips and gums were normal [Oropharynx] : the oropharynx was normal [Normal Appearance] : the appearance of the neck was normal [No Neck Mass] : no neck mass was observed [No Respiratory Distress] : no respiratory distress [No Acc Muscle Use] : no accessory muscle use [Respiration, Rhythm And Depth] : normal respiratory rhythm and effort [Auscultation Breath Sounds / Voice Sounds] : lungs were clear to auscultation bilaterally [Heart Rate And Rhythm] : heart rate was normal and rhythm regular [Normal S1, S2] : normal S1 and S2 [Murmurs] : no murmurs [Bowel Sounds] : normal bowel sounds [Abdomen Tenderness] : non-tender [No Masses] : no abdominal mass palpated [Abdomen Soft] : soft [] : no hepatosplenomegaly [Oriented To Time, Place, And Person] : oriented to person, place, and time

## 2024-05-15 NOTE — ASSESSMENT
[FreeTextEntry1] : The patient's drop in hemoglobin at the time of his last admission was clearly due to the acute kidney injury superimposed upon chronic kidney disease and has been stable since discharge.  He only required 1 unit of packed red blood cells during that hospital stay.  The patient denies any rectal bleeding, melena or maroon stool since last year and therefore there is nothing to suggest that he has experienced any significant gastrointestinal bleeding.  He is also completely asymptomatic from a gastrointestinal standpoint.  I have therefore recommended that he restart the Eliquis and that at this time I do not think he requires an endoscopy or sigmoidoscopy/colonoscopy.  I recommended that he simply follow-up with me in 6 months.  If he should develop any rectal bleeding, maroon stools or melena it would be reasonable to proceed with a follow-up sigmoidoscopy as well as possible upper endoscopy.

## 2024-05-15 NOTE — HISTORY OF PRESENT ILLNESS
[FreeTextEntry1] : In April of this year the patient required 1 of many admissions to [facility].  At that time he exhibited a hypertensive emergency with acute kidney injury on chronic kidney disease, likely ATN.  On admission his hemoglobin was 8 but he usually has hemoglobins that run in the 9 range.  He was not experiencing any rectal bleeding or maroon stools but at the time of the gastro intestinal consultation he did note that in the more remote past, somewhat more than 1 month prior to admission, he had noted occasional maroon stools. He has a h/o seizure, HTN, aortic dissection s/p repair, CAD and VHD with prior CABG/bio-AVR/bio-MVR, bowel ischemia treated with resection, there is also a history of ESRD was on dialysis which has been discontinued but was restarted during his most recent hospitalization.  He has also exhibited some AVMs at the ileocolonic anastomosis which have been previously been cauterized in 2023.  He also has a history of atrial fibrillation/flutter with previous RFA, PVCs,, subdural/subarachnoid hemorrhage, restarted Eliquis 8/2023, MUSTAPHA performed 9/27/23 with +MV vegetation and source thought to be AV graft which was removed. 10/1/23, treated with 6 week A/B course, repeat MUSTAPHA in December 2023 negative for any vegetations. AV fistula thought to be responsible for the infection which has been removed.  He was schedule for BLAINE occlusion/Watchman implant and was noted to have SANTOS (Cr 5; baseline ) and anemia (Hg 7.1) for which he was admitted.  He was discharged on April 22.  He is here today for further evaluation of the anemia and the prior history of occasional maroon stools.  During his most recent hospitalization he also exhibited nonsustained V. tach.  In June 2022 he underwent a colonoscopy which revealed an anastomosis at 10 cm as a result of a prior subtotal colectomy.  There appeared to be an end-to-side anastomosis with some neurovasculature at the anastomotic site which was somewhat friable.  However there was no significant bleeding.  There were internal hemorrhoids.  The scope was passed to 60 cm from the anal verge with no other significant abnormalities.  Year prior he underwent a flexible sigmoidoscopy with APC of what was thought to be some arteriovenous malformations at the ileorectal anastomosis at 10 cm.  At that same time he underwent an upper endoscopy that was normal.  The patient is being seen today for further follow-up.  At the time of discharge she did not want to start the Eliquis until after undergoing a follow-up sigmoidoscopy.  At the time of this visit he is feeling well without any abdominal pain, nausea or vomiting.  He has chronic diarrhea which is controlled with Lomotil as result of his prior subtotal colectomy.  Since last year he has not seen any further blood in his stool and at the time of this visit he denies having seen any maroon stools or black stools either since last year.  He is back on dialysis every Tuesday, Thursday and Saturday.  According to the patient his hemoglobin has been stable and is being followed by the dialysis center.

## 2024-05-31 ENCOUNTER — APPOINTMENT (OUTPATIENT)
Dept: VASCULAR SURGERY | Facility: CLINIC | Age: 64
End: 2024-05-31
Payer: MEDICARE

## 2024-05-31 VITALS
SYSTOLIC BLOOD PRESSURE: 182 MMHG | HEART RATE: 68 BPM | TEMPERATURE: 97.5 F | HEIGHT: 68 IN | OXYGEN SATURATION: 98 % | RESPIRATION RATE: 16 BRPM | DIASTOLIC BLOOD PRESSURE: 73 MMHG | WEIGHT: 171 LBS | BODY MASS INDEX: 25.91 KG/M2

## 2024-05-31 PROCEDURE — 93986 DUP-SCAN HEMO COMPL UNI STD: CPT

## 2024-05-31 PROCEDURE — 99214 OFFICE O/P EST MOD 30 MIN: CPT

## 2024-05-31 NOTE — HISTORY OF PRESENT ILLNESS
[FreeTextEntry1] : 63-year-old male.  Multiple medical comorbidities previous renal transplant.  Today he had a infected loop AV graft status post excision.  He has completed 6 weeks of antibiotics.  He is now back on hemodialysis via right IJ permacatheter.  He is here for new access.  He is requesting that an access be placed in the same arm because his right upper extremity is the only extremity that he has a dysfunctional given history of strokes.  He is scheduled for watchman in August.  Also of note he has a history of type a repair with residual aortic dissection.  Last CAT scan was in October 2023 which I reviewed.  Uncomplicated.  He has not had a CAT scan since.

## 2024-05-31 NOTE — PHYSICAL EXAM
[de-identified] : Appears well [FreeTextEntry1] : Palpable brachial artery of above the elbow.  There is a prominent lump that is pulsatile just below the antecubital fossa. [de-identified] : Left upper extremity graft excision sites are well-healed.  No evidence of residual infection.  Easily palpable brachial pulse no swelling. [de-identified] : Positive left upper extremity weakness with contractures of the hand.

## 2024-05-31 NOTE — ASSESSMENT
[FreeTextEntry1] : Bovine Pericardial patch was placed and the artery was quite inflamed.  On ultrasound today there is a 1-1/2 cm pseudoaneurysm in the area of the past.  Likely there is disruption of the patch.  It is unclear whether there is secondary to poor tissue or infection.  Given there is no current signs of infection likely secondary to poor quality of the artery.  I explained to him prior to creation of a new access we will have to address this.  That he may need a bypass with ligation of the brachial artery.  Unfortunately it is right before the bifurcation which makes things somewhat more difficult.  If he requires a bypass he will be difficult to provide access in that same arm.  Will schedule him for repair of the brachial artery pseudoaneurysm.  Will need to evaluate his cardiac risk.  Will need to hold off on AV access.

## 2024-06-10 ENCOUNTER — OUTPATIENT (OUTPATIENT)
Dept: OUTPATIENT SERVICES | Facility: HOSPITAL | Age: 64
LOS: 1 days | End: 2024-06-10
Payer: MEDICARE

## 2024-06-10 VITALS
DIASTOLIC BLOOD PRESSURE: 80 MMHG | TEMPERATURE: 97 F | HEIGHT: 68 IN | SYSTOLIC BLOOD PRESSURE: 160 MMHG | HEART RATE: 64 BPM | OXYGEN SATURATION: 96 % | RESPIRATION RATE: 20 BRPM | WEIGHT: 171.52 LBS

## 2024-06-10 DIAGNOSIS — Z90.49 ACQUIRED ABSENCE OF OTHER SPECIFIED PARTS OF DIGESTIVE TRACT: Chronic | ICD-10-CM

## 2024-06-10 DIAGNOSIS — I25.10 ATHEROSCLEROTIC HEART DISEASE OF NATIVE CORONARY ARTERY WITHOUT ANGINA PECTORIS: ICD-10-CM

## 2024-06-10 DIAGNOSIS — Z94.0 KIDNEY TRANSPLANT STATUS: Chronic | ICD-10-CM

## 2024-06-10 DIAGNOSIS — Z86.79 PERSONAL HISTORY OF OTHER DISEASES OF THE CIRCULATORY SYSTEM: Chronic | ICD-10-CM

## 2024-06-10 DIAGNOSIS — I72.1 ANEURYSM OF ARTERY OF UPPER EXTREMITY: ICD-10-CM

## 2024-06-10 DIAGNOSIS — I77.0 ARTERIOVENOUS FISTULA, ACQUIRED: Chronic | ICD-10-CM

## 2024-06-10 DIAGNOSIS — Z95.2 PRESENCE OF PROSTHETIC HEART VALVE: Chronic | ICD-10-CM

## 2024-06-10 DIAGNOSIS — Z01.818 ENCOUNTER FOR OTHER PREPROCEDURAL EXAMINATION: ICD-10-CM

## 2024-06-10 DIAGNOSIS — Z95.1 PRESENCE OF AORTOCORONARY BYPASS GRAFT: Chronic | ICD-10-CM

## 2024-06-10 DIAGNOSIS — Z29.9 ENCOUNTER FOR PROPHYLACTIC MEASURES, UNSPECIFIED: ICD-10-CM

## 2024-06-10 DIAGNOSIS — Z13.89 ENCOUNTER FOR SCREENING FOR OTHER DISORDER: ICD-10-CM

## 2024-06-10 LAB
A1C WITH ESTIMATED AVERAGE GLUCOSE RESULT: 4.8 % — SIGNIFICANT CHANGE UP (ref 4–5.6)
ALBUMIN SERPL ELPH-MCNC: 4 G/DL — SIGNIFICANT CHANGE UP (ref 3.3–5.2)
ALP SERPL-CCNC: 77 U/L — SIGNIFICANT CHANGE UP (ref 40–120)
ALT FLD-CCNC: 14 U/L — SIGNIFICANT CHANGE UP
ANION GAP SERPL CALC-SCNC: 15 MMOL/L — SIGNIFICANT CHANGE UP (ref 5–17)
APTT BLD: 35.8 SEC — HIGH (ref 24.5–35.6)
AST SERPL-CCNC: 11 U/L — SIGNIFICANT CHANGE UP
BASOPHILS # BLD AUTO: 0.04 K/UL — SIGNIFICANT CHANGE UP (ref 0–0.2)
BASOPHILS NFR BLD AUTO: 0.9 % — SIGNIFICANT CHANGE UP (ref 0–2)
BILIRUB SERPL-MCNC: 0.6 MG/DL — SIGNIFICANT CHANGE UP (ref 0.4–2)
BLD GP AB SCN SERPL QL: SIGNIFICANT CHANGE UP
BUN SERPL-MCNC: 64.3 MG/DL — HIGH (ref 8–20)
CALCIUM SERPL-MCNC: 8.8 MG/DL — SIGNIFICANT CHANGE UP (ref 8.4–10.5)
CHLORIDE SERPL-SCNC: 102 MMOL/L — SIGNIFICANT CHANGE UP (ref 96–108)
CO2 SERPL-SCNC: 24 MMOL/L — SIGNIFICANT CHANGE UP (ref 22–29)
COMMENT - BLOOD BANK: SIGNIFICANT CHANGE UP
CREAT SERPL-MCNC: 5.62 MG/DL — HIGH (ref 0.5–1.3)
EGFR: 11 ML/MIN/1.73M2 — LOW
EOSINOPHIL # BLD AUTO: 0.33 K/UL — SIGNIFICANT CHANGE UP (ref 0–0.5)
EOSINOPHIL NFR BLD AUTO: 7.1 % — HIGH (ref 0–6)
ESTIMATED AVERAGE GLUCOSE: 91 MG/DL — SIGNIFICANT CHANGE UP (ref 68–114)
GLUCOSE SERPL-MCNC: 99 MG/DL — SIGNIFICANT CHANGE UP (ref 70–99)
HCT VFR BLD CALC: 26.7 % — LOW (ref 39–50)
HGB BLD-MCNC: 8.8 G/DL — LOW (ref 13–17)
IMM GRANULOCYTES NFR BLD AUTO: 0.2 % — SIGNIFICANT CHANGE UP (ref 0–0.9)
INR BLD: 1.17 RATIO — SIGNIFICANT CHANGE UP (ref 0.85–1.18)
LYMPHOCYTES # BLD AUTO: 1.22 K/UL — SIGNIFICANT CHANGE UP (ref 1–3.3)
LYMPHOCYTES # BLD AUTO: 26.1 % — SIGNIFICANT CHANGE UP (ref 13–44)
MCHC RBC-ENTMCNC: 30.1 PG — SIGNIFICANT CHANGE UP (ref 27–34)
MCHC RBC-ENTMCNC: 33 GM/DL — SIGNIFICANT CHANGE UP (ref 32–36)
MCV RBC AUTO: 91.4 FL — SIGNIFICANT CHANGE UP (ref 80–100)
MONOCYTES # BLD AUTO: 0.3 K/UL — SIGNIFICANT CHANGE UP (ref 0–0.9)
MONOCYTES NFR BLD AUTO: 6.4 % — SIGNIFICANT CHANGE UP (ref 2–14)
NEUTROPHILS # BLD AUTO: 2.77 K/UL — SIGNIFICANT CHANGE UP (ref 1.8–7.4)
NEUTROPHILS NFR BLD AUTO: 59.3 % — SIGNIFICANT CHANGE UP (ref 43–77)
PLATELET # BLD AUTO: 99 K/UL — LOW (ref 150–400)
POTASSIUM SERPL-MCNC: 4.4 MMOL/L — SIGNIFICANT CHANGE UP (ref 3.5–5.3)
POTASSIUM SERPL-SCNC: 4.4 MMOL/L — SIGNIFICANT CHANGE UP (ref 3.5–5.3)
PROT SERPL-MCNC: 7.2 G/DL — SIGNIFICANT CHANGE UP (ref 6.6–8.7)
PROTHROM AB SERPL-ACNC: 12.9 SEC — SIGNIFICANT CHANGE UP (ref 9.5–13)
RBC # BLD: 2.92 M/UL — LOW (ref 4.2–5.8)
RBC # FLD: 14.3 % — SIGNIFICANT CHANGE UP (ref 10.3–14.5)
SODIUM SERPL-SCNC: 141 MMOL/L — SIGNIFICANT CHANGE UP (ref 135–145)
WBC # BLD: 4.67 K/UL — SIGNIFICANT CHANGE UP (ref 3.8–10.5)
WBC # FLD AUTO: 4.67 K/UL — SIGNIFICANT CHANGE UP (ref 3.8–10.5)

## 2024-06-10 PROCEDURE — 36415 COLL VENOUS BLD VENIPUNCTURE: CPT

## 2024-06-10 PROCEDURE — 85610 PROTHROMBIN TIME: CPT

## 2024-06-10 PROCEDURE — 86900 BLOOD TYPING SEROLOGIC ABO: CPT

## 2024-06-10 PROCEDURE — 86077 PHYS BLOOD BANK SERV XMATCH: CPT

## 2024-06-10 PROCEDURE — 85025 COMPLETE CBC W/AUTO DIFF WBC: CPT

## 2024-06-10 PROCEDURE — 83036 HEMOGLOBIN GLYCOSYLATED A1C: CPT

## 2024-06-10 PROCEDURE — G0463: CPT

## 2024-06-10 PROCEDURE — 80053 COMPREHEN METABOLIC PANEL: CPT

## 2024-06-10 PROCEDURE — 86901 BLOOD TYPING SEROLOGIC RH(D): CPT

## 2024-06-10 PROCEDURE — 86850 RBC ANTIBODY SCREEN: CPT

## 2024-06-10 PROCEDURE — 86880 COOMBS TEST DIRECT: CPT

## 2024-06-10 PROCEDURE — 85730 THROMBOPLASTIN TIME PARTIAL: CPT

## 2024-06-10 RX ORDER — DIPHENOXYLATE HCL/ATROPINE 2.5-.025MG
2 TABLET ORAL
Refills: 0 | DISCHARGE

## 2024-06-10 NOTE — H&P PST ADULT - NSICDXPASTMEDICALHX_GEN_ALL_CORE_FT
PAST MEDICAL HISTORY:  Aneurysm of artery of upper extremity     Atrial fibrillation     CAD (coronary artery disease) s/p PCI 1998  and CABG x 2 11/2020    CHF (congestive heart failure)     Chronic atrial fibrillation     COVID-19 october 2020    CVA (cerebral vascular accident)     Former smoker     GIB (gastrointestinal bleeding)     H/O aortic dissection s/p repair (2010), surgery complicated by bowel ischemia s/p bowel resection and ostomy with reversal    H/O aortic valve insufficiency     History of cocaine use remote hx, currently sober    HTN (hypertension)     Hyperlipemia     Mitral regurgitation     Seizures last seizure 10 years ago     PAST MEDICAL HISTORY:  Anemia of chronic disease     Aneurysm of artery of upper extremity     Atrial fibrillation     CAD (coronary artery disease) s/p PCI 1998  and CABG x 2 11/2020    CHF (congestive heart failure)     Chronic atrial fibrillation     COVID-19 october 2020    CVA (cerebral vascular accident)     Former smoker     GIB (gastrointestinal bleeding)     H/O aortic dissection s/p repair (2010), surgery complicated by bowel ischemia s/p bowel resection and ostomy with reversal    H/O aortic valve insufficiency     History of cocaine use remote hx, currently sober    HTN (hypertension)     Hyperlipemia     Mitral regurgitation     Seizures last seizure 10 years ago

## 2024-06-10 NOTE — H&P PST ADULT - NSHP PST DIAGOTHER LIST_GEN_A_CORE
tube feeding
6/10/2024: abnormal labs reported to Dr Crawford and faxed to PCP Dr Yo. Anum Valentine NP

## 2024-06-10 NOTE — H&P PST ADULT - PROBLEM SELECTOR PLAN 2
preop assessment, repair of left brachial artery possible bypass left upper extremity w/Dr Crawford scheduled for 6/20/2024, pending medical and cardiac clearances

## 2024-06-10 NOTE — H&P PST ADULT - ASSESSMENT
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 Patient has completed 6 weeks of antibiotics. He is now back on hemodialysis via right IJ permacath. Preop assessment prior to repair of left brachial artery possible bypass left upper extremity w/Dr Crawford scheduled for 6/20/2024, pending medical and cardiac clearances 64 yo M PMH of HTN, HLD, Hx of seizures (last seizure in ), systolic CHF, Afib s/p ablation, Type A aortic dissection s/p aortic dissection repair with bowel ischemia requiring bowel resection, repeat sternotomy in 2020 with CABG x2, bioprosthetic aortic and mitral valve replacements, stroke, renal failure on ESRD, infected loop left brachial AV graft status post excision. Patient has completed 6 weeks of antibiotics. He is now back on hemodialysis via right IJ permacath. Preop assessment prior to repair of left brachial artery possible bypass left upper extremity w/Dr Crawford scheduled for 2024, pending medical and cardiac clearances    Patient was educated on preop preparation with written and verbal instructions. Pt was informed to obtain clearances >3 days before surgery. Pt was educated on NSAIDs, multivitamins and herbals that increase the risk of bleeding and need to be stopped 7 days before procedure. Pt verbalized understanding of the above.     OPIOID RISK TOOL    KOMAL EACH BOX THAT APPLIES AND ADD TOTALS AT THE END    FAMILY HISTORY OF SUBSTANCE ABUSE                 FEMALE         MALE                                                Alcohol                             [  ]1 pt          [  ]3pts                                               Illegal Durgs                     [  ]2 pts        [  ]3pts                                               Rx Drugs                           [  ]4 pts        [  ]4 pts    PERSONAL HISTORY OF SUBSTANCE ABUSE                                                                                          Alcohol                             [  ]3 pts       [  ]3 pts                                               Illegal Drugs                     [  ]4 pts        [ x ]4 pts                                               Rx Drugs                           [  ]5 pts        [  ]5 pts    AGE BETWEEN 16-45 YEARS                                      [  ]1 pt         [  ]1 pt    HISTORY OF PREADOLESCENT   SEXUAL ABUSE                                                             [  ]3 pts        [  ]0pts    PSYCHOLOGICAL DISEASE                     ADD, OCD, Bipolar, Schizophrenia        [  ]2 pts         [  ]2 pts                      Depression                                               [  ]1 pt           [  ]1 pt           SCORING TOTAL   (add numbers and type here)              ( 4 )                                     A score of 3 or lower indicated LOW risk for future opioid abuse  A score of 4 to 7 indicated moderate risk for future opioid abuse  A score of 8 or higher indicates a high risk for opioid abuse    CAPRINI VTE 2.0 SCORE [CLOT updated 2019]    AGE RELATED RISK FACTORS                                                       MOBILITY RELATED FACTORS  [ ] Age 41-60 years                                            (1 Point)                    [ ] Bed rest                                                        (1 Point)  [x ] Age: 61-74 years                                           (2 Points)                  [ ] Plaster cast                                                   (2 Points)  [ ] Age= 75 years                                              (3 Points)                    [ ] Bed bound for more than 72 hours                 (2 Points)    DISEASE RELATED RISK FACTORS                                               GENDER SPECIFIC FACTORS  [ x] Edema in the lower extremities                       (1 Point)              [ ] Pregnancy                                                     (1 Point)  [ ] Varicose veins                                               (1 Point)                     [ ] Post-partum < 6 weeks                                   (1 Point)             [ x] BMI > 25 Kg/m2                                            (1 Point)                     [ ] Hormonal therapy  or oral contraception          (1 Point)                 [ ] Sepsis (in the previous month)                        (1 Point)               [ ] History of pregnancy complications                 (1 point)  [ ] Pneumonia or serious lung disease                                               [ ] Unexplained or recurrent                     (1 Point)           (in the previous month)                               (1 Point)  [ ] Abnormal pulmonary function test                     (1 Point)                 SURGERY RELATED RISK FACTORS  [ ] Acute myocardial infarction                              (1 Point)               [ ]  Section                                             (1 Point)  [ ] Congestive heart failure (in the previous month)  (1 Point)      [ ] Minor surgery                                                  (1 Point)   [ ] Inflammatory bowel disease                             (1 Point)               [ ] Arthroscopic surgery                                        (2 Points)  [ ] Central venous access                                      (2 Points)                [ x] General surgery lasting more than 45 minutes (2 points)  [ ] Malignancy- Present or previous                   (2 Points)                [ ] Elective arthroplasty                                         (5 points)    [ ] Stroke (in the previous month)                          (5 Points)                                                                                                                                                           HEMATOLOGY RELATED FACTORS                                                 TRAUMA RELATED RISK FACTORS  [ ] Prior episodes of VTE                                     (3 Points)                [ ] Fracture of the hip, pelvis, or leg                       (5 Points)  [ ] Positive family history for VTE                         (3 Points)             [ ] Acute spinal cord injury (in the previous month)  (5 Points)  [ ] Prothrombin 66236 A                                     (3 Points)               [ ] Paralysis  (less than 1 month)                             (5 Points)  [ ] Factor V Leiden                                             (3 Points)                  [ ] Multiple Trauma within 1 month                        (5 Points)  [ ] Lupus anticoagulants                                     (3 Points)                                                           [ ] Anticardiolipin antibodies                               (3 Points)                                                       [ ] High homocysteine in the blood                      (3 Points)                                             [ ] Other congenital or acquired thrombophilia      (3 Points)                                                [ ] Heparin induced thrombocytopenia                  (3 Points)                                     Total Score [      6    ] 62 yo M PMH of HTN, HLD, Hx of seizures (last seizure in ), systolic CHF, Afib s/p ablation, Type A aortic dissection s/p aortic dissection repair with bowel ischemia requiring bowel resection, repeat sternotomy in 2020 with CABG x2, bioprosthetic aortic and mitral valve replacements, stroke, renal failure on ESRD, infected loop left brachial AV graft status post excision. Patient has completed 6 weeks of antibiotics. He is now back on hemodialysis Tues/Thurs/Sat via right IJ permacath. Preop assessment prior to repair of left brachial artery possible bypass left upper extremity w/Dr Crawford scheduled for 2024, pending medical and cardiac clearances    Patient was educated on preop preparation with written and verbal instructions. Pt was informed to obtain clearances >3 days before surgery. Pt was educated on NSAIDs, multivitamins and herbals that increase the risk of bleeding and need to be stopped 7 days before procedure. Pt verbalized understanding of the above.     OPIOID RISK TOOL    KOMAL EACH BOX THAT APPLIES AND ADD TOTALS AT THE END    FAMILY HISTORY OF SUBSTANCE ABUSE                 FEMALE         MALE                                                Alcohol                             [  ]1 pt          [  ]3pts                                               Illegal Durgs                     [  ]2 pts        [  ]3pts                                               Rx Drugs                           [  ]4 pts        [  ]4 pts    PERSONAL HISTORY OF SUBSTANCE ABUSE                                                                                          Alcohol                             [  ]3 pts       [  ]3 pts                                               Illegal Drugs                     [  ]4 pts        [ x ]4 pts                                               Rx Drugs                           [  ]5 pts        [  ]5 pts    AGE BETWEEN 16-45 YEARS                                      [  ]1 pt         [  ]1 pt    HISTORY OF PREADOLESCENT   SEXUAL ABUSE                                                             [  ]3 pts        [  ]0pts    PSYCHOLOGICAL DISEASE                     ADD, OCD, Bipolar, Schizophrenia        [  ]2 pts         [  ]2 pts                      Depression                                               [  ]1 pt           [  ]1 pt           SCORING TOTAL   (add numbers and type here)              ( 4 )                                     A score of 3 or lower indicated LOW risk for future opioid abuse  A score of 4 to 7 indicated moderate risk for future opioid abuse  A score of 8 or higher indicates a high risk for opioid abuse    CAPRINI VTE 2.0 SCORE [CLOT updated 2019]    AGE RELATED RISK FACTORS                                                       MOBILITY RELATED FACTORS  [ ] Age 41-60 years                                            (1 Point)                    [ ] Bed rest                                                        (1 Point)  [x ] Age: 61-74 years                                           (2 Points)                  [ ] Plaster cast                                                   (2 Points)  [ ] Age= 75 years                                              (3 Points)                    [ ] Bed bound for more than 72 hours                 (2 Points)    DISEASE RELATED RISK FACTORS                                               GENDER SPECIFIC FACTORS  [ x] Edema in the lower extremities                       (1 Point)              [ ] Pregnancy                                                     (1 Point)  [ ] Varicose veins                                               (1 Point)                     [ ] Post-partum < 6 weeks                                   (1 Point)             [ x] BMI > 25 Kg/m2                                            (1 Point)                     [ ] Hormonal therapy  or oral contraception          (1 Point)                 [ ] Sepsis (in the previous month)                        (1 Point)               [ ] History of pregnancy complications                 (1 point)  [ ] Pneumonia or serious lung disease                                               [ ] Unexplained or recurrent                     (1 Point)           (in the previous month)                               (1 Point)  [ ] Abnormal pulmonary function test                     (1 Point)                 SURGERY RELATED RISK FACTORS  [ ] Acute myocardial infarction                              (1 Point)               [ ]  Section                                             (1 Point)  [ ] Congestive heart failure (in the previous month)  (1 Point)      [ ] Minor surgery                                                  (1 Point)   [ ] Inflammatory bowel disease                             (1 Point)               [ ] Arthroscopic surgery                                        (2 Points)  [ ] Central venous access                                      (2 Points)                [ x] General surgery lasting more than 45 minutes (2 points)  [ ] Malignancy- Present or previous                   (2 Points)                [ ] Elective arthroplasty                                         (5 points)    [ ] Stroke (in the previous month)                          (5 Points)                                                                                                                                                           HEMATOLOGY RELATED FACTORS                                                 TRAUMA RELATED RISK FACTORS  [ ] Prior episodes of VTE                                     (3 Points)                [ ] Fracture of the hip, pelvis, or leg                       (5 Points)  [ ] Positive family history for VTE                         (3 Points)             [ ] Acute spinal cord injury (in the previous month)  (5 Points)  [ ] Prothrombin 63557 A                                     (3 Points)               [ ] Paralysis  (less than 1 month)                             (5 Points)  [ ] Factor V Leiden                                             (3 Points)                  [ ] Multiple Trauma within 1 month                        (5 Points)  [ ] Lupus anticoagulants                                     (3 Points)                                                           [ ] Anticardiolipin antibodies                               (3 Points)                                                       [ ] High homocysteine in the blood                      (3 Points)                                             [ ] Other congenital or acquired thrombophilia      (3 Points)                                                [ ] Heparin induced thrombocytopenia                  (3 Points)                                     Total Score [      6    ]

## 2024-06-10 NOTE — H&P PST ADULT - PROBLEM SELECTOR PLAN 3
caprini score risk for dvt, SCD ordered, surgical team to assess for dvt prophylaxis caprini score 6, moderate risk for dvt, SCD ordered, surgical team to assess for dvt prophylaxis

## 2024-06-10 NOTE — H&P PST ADULT - ATTENDING COMMENTS
pt with brachial artery artery pseudo on left , believe previous patch lifted , he has no signs of infection but this was previously an infected field , need to evaluate possibly patch with vein first interposition bypass using his basilic vein , jade rivera will place graft at this time .

## 2024-06-10 NOTE — H&P PST ADULT - HISTORY OF PRESENT ILLNESS
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 Patient has completed 6 weeks of antibiotics. He is now back on hemodialysis via right IJ permacath. Preop assessment prior to repair of left brachial artery possible bypass left upper extremity w/Dr Crawford scheduled for 6/20/2024, pending medical and cardiac clearances 64 yo M PMH of HTN, HLD, Hx of seizures (last seizure in 2010), systolic CHF, Afib s/p ablation, Type A aortic dissection s/p aortic dissection repair with bowel ischemia requiring bowel resection, repeat sternotomy in 11/2020 with CABG x2, bioprosthetic aortic and mitral valve replacements, stroke, renal failure on ESRD, infected loop left brachial AV graft status post excision. Patient has completed 6 weeks of antibiotics. He is now back on hemodialysis via right IJ permacath. Preop assessment prior to repair of left brachial artery possible bypass left upper extremity w/Dr Crawford scheduled for 6/20/2024, pending medical and cardiac clearances     64 yo M PMH of HTN, HLD, Hx of seizures (last seizure in 2010), systolic CHF, Afib s/p ablation, Type A aortic dissection s/p aortic dissection repair with bowel ischemia requiring bowel resection, repeat sternotomy in 11/2020 with CABG x2, bioprosthetic aortic and mitral valve replacements, stroke, renal failure on ESRD, anemia, infected loop left brachial AV graft status post excision. Patient has completed 6 weeks of antibiotics. He is now back on hemodialysis via right IJ permacath. Preop assessment prior to repair of left brachial artery possible bypass left upper extremity w/Dr Crawford scheduled for 6/20/2024, pending medical and cardiac clearances     64 yo M PMH of HTN, HLD, Hx of seizures (last seizure in 2010), systolic CHF, Afib s/p ablation, Type A aortic dissection s/p aortic dissection repair with bowel ischemia requiring bowel resection, repeat sternotomy in 11/2020 with CABG x2, bioprosthetic aortic and mitral valve replacements, stroke, renal failure on ESRD, anemia, infected loop left brachial AV graft status post excision. Patient has completed 6 weeks of antibiotics. He is now back on hemodialysis Tues/Thurs/Sat via right IJ permacath. Preop assessment prior to repair of left brachial artery possible bypass left upper extremity w/Dr Crawford scheduled for 6/20/2024, pending medical and cardiac clearances

## 2024-06-10 NOTE — H&P PST ADULT - VASCULAR DETAILS
brachial pulses: right +2 palpable, left +2 palpable brachial pulses: right +2 palpable, left +2 palpable, well healed surgical scar in left UE, no s/s of infection noted  <3 sec capillary refill in upper and lower extremities

## 2024-06-10 NOTE — H&P PST ADULT - EKG AND INTERPRETATION
4/12/2024: dinus rhythm, left axis deviation, LVH with repolarization abnormality, HR 70, prolonged QT, cardiac clearance pending 4/12/2024: sinus rhythm, left axis deviation, LVH with repolarization abnormality, HR 70, prolonged QT, cardiac clearance pending

## 2024-06-10 NOTE — H&P PST ADULT - NSICDXPASTSURGICALHX_GEN_ALL_CORE_FT
PAST SURGICAL HISTORY:  AV fistula LUE    H/O aortic dissection Type A; emergent repair 2010    H/O colectomy     History of renal transplant     S/P AVR (aortic valve replacement) (t)AVR 11/2020 Dr Butler    S/P MVR (mitral valve replacement) (t)MVR 11/2020 Dr Butler    Status post double vessel coronary artery bypass 11/2020 Dr Butler

## 2024-06-12 ENCOUNTER — OUTPATIENT (OUTPATIENT)
Dept: OUTPATIENT SERVICES | Facility: HOSPITAL | Age: 64
LOS: 1 days | End: 2024-06-12
Payer: MEDICARE

## 2024-06-12 DIAGNOSIS — Z01.812 ENCOUNTER FOR PREPROCEDURAL LABORATORY EXAMINATION: ICD-10-CM

## 2024-06-12 DIAGNOSIS — Z90.49 ACQUIRED ABSENCE OF OTHER SPECIFIED PARTS OF DIGESTIVE TRACT: Chronic | ICD-10-CM

## 2024-06-12 DIAGNOSIS — I77.0 ARTERIOVENOUS FISTULA, ACQUIRED: Chronic | ICD-10-CM

## 2024-06-12 DIAGNOSIS — Z86.79 PERSONAL HISTORY OF OTHER DISEASES OF THE CIRCULATORY SYSTEM: Chronic | ICD-10-CM

## 2024-06-12 DIAGNOSIS — Z95.2 PRESENCE OF PROSTHETIC HEART VALVE: Chronic | ICD-10-CM

## 2024-06-12 DIAGNOSIS — Z95.1 PRESENCE OF AORTOCORONARY BYPASS GRAFT: Chronic | ICD-10-CM

## 2024-06-12 DIAGNOSIS — Z94.0 KIDNEY TRANSPLANT STATUS: Chronic | ICD-10-CM

## 2024-06-12 PROBLEM — I72.1 ANEURYSM OF ARTERY OF UPPER EXTREMITY: Chronic | Status: ACTIVE | Noted: 2024-06-10

## 2024-06-12 PROBLEM — D63.8 ANEMIA IN OTHER CHRONIC DISEASES CLASSIFIED ELSEWHERE: Chronic | Status: ACTIVE | Noted: 2024-06-10

## 2024-06-17 LAB — DIR ANTIGLOB POLYSPECIFIC INTERPRETATION: SIGNIFICANT CHANGE UP

## 2024-06-17 PROCEDURE — 86901 BLOOD TYPING SEROLOGIC RH(D): CPT

## 2024-06-17 PROCEDURE — 36415 COLL VENOUS BLD VENIPUNCTURE: CPT

## 2024-06-17 PROCEDURE — 86850 RBC ANTIBODY SCREEN: CPT

## 2024-06-17 PROCEDURE — 86870 RBC ANTIBODY IDENTIFICATION: CPT

## 2024-06-17 PROCEDURE — 86900 BLOOD TYPING SEROLOGIC ABO: CPT

## 2024-06-18 ENCOUNTER — APPOINTMENT (OUTPATIENT)
Dept: CARDIOLOGY | Facility: CLINIC | Age: 64
End: 2024-06-18

## 2024-06-19 ENCOUNTER — TRANSCRIPTION ENCOUNTER (OUTPATIENT)
Age: 64
End: 2024-06-19

## 2024-06-19 RX ORDER — CLINDAMYCIN PHOSPHATE 150 MG/ML
900 INJECTION, SOLUTION INTRAVENOUS ONCE
Refills: 0 | Status: DISCONTINUED | OUTPATIENT
Start: 2024-06-20 | End: 2024-06-21

## 2024-06-20 ENCOUNTER — APPOINTMENT (OUTPATIENT)
Dept: VASCULAR SURGERY | Facility: HOSPITAL | Age: 64
End: 2024-06-20

## 2024-06-20 ENCOUNTER — INPATIENT (INPATIENT)
Facility: HOSPITAL | Age: 64
LOS: 0 days | Discharge: HOME | DRG: 301 | End: 2024-06-21
Attending: SURGERY | Admitting: SURGERY
Payer: MEDICARE

## 2024-06-20 VITALS
TEMPERATURE: 99 F | WEIGHT: 171.52 LBS | HEART RATE: 60 BPM | RESPIRATION RATE: 15 BRPM | SYSTOLIC BLOOD PRESSURE: 154 MMHG | OXYGEN SATURATION: 98 % | DIASTOLIC BLOOD PRESSURE: 76 MMHG | HEIGHT: 68 IN

## 2024-06-20 DIAGNOSIS — Z94.0 KIDNEY TRANSPLANT STATUS: Chronic | ICD-10-CM

## 2024-06-20 DIAGNOSIS — Z95.2 PRESENCE OF PROSTHETIC HEART VALVE: Chronic | ICD-10-CM

## 2024-06-20 DIAGNOSIS — Z95.1 PRESENCE OF AORTOCORONARY BYPASS GRAFT: Chronic | ICD-10-CM

## 2024-06-20 DIAGNOSIS — Z86.79 PERSONAL HISTORY OF OTHER DISEASES OF THE CIRCULATORY SYSTEM: Chronic | ICD-10-CM

## 2024-06-20 DIAGNOSIS — I72.1 ANEURYSM OF ARTERY OF UPPER EXTREMITY: ICD-10-CM

## 2024-06-20 DIAGNOSIS — Z90.49 ACQUIRED ABSENCE OF OTHER SPECIFIED PARTS OF DIGESTIVE TRACT: Chronic | ICD-10-CM

## 2024-06-20 DIAGNOSIS — I77.0 ARTERIOVENOUS FISTULA, ACQUIRED: Chronic | ICD-10-CM

## 2024-06-20 LAB
ANION GAP SERPL CALC-SCNC: 17 MMOL/L — SIGNIFICANT CHANGE UP (ref 5–17)
BASOPHILS # BLD AUTO: 0.03 K/UL — SIGNIFICANT CHANGE UP (ref 0–0.2)
BASOPHILS NFR BLD AUTO: 0.6 % — SIGNIFICANT CHANGE UP (ref 0–2)
BLD GP AB SCN SERPL QL: SIGNIFICANT CHANGE UP
BUN SERPL-MCNC: 43.9 MG/DL — HIGH (ref 8–20)
CALCIUM SERPL-MCNC: 8.2 MG/DL — LOW (ref 8.4–10.5)
CHLORIDE SERPL-SCNC: 104 MMOL/L — SIGNIFICANT CHANGE UP (ref 96–108)
CO2 SERPL-SCNC: 24 MMOL/L — SIGNIFICANT CHANGE UP (ref 22–29)
CREAT SERPL-MCNC: 6.64 MG/DL — HIGH (ref 0.5–1.3)
DIR ANTIGLOB POLYSPECIFIC INTERPRETATION: SIGNIFICANT CHANGE UP
EGFR: 9 ML/MIN/1.73M2 — LOW
EOSINOPHIL # BLD AUTO: 0.24 K/UL — SIGNIFICANT CHANGE UP (ref 0–0.5)
EOSINOPHIL NFR BLD AUTO: 5 % — SIGNIFICANT CHANGE UP (ref 0–6)
GLUCOSE SERPL-MCNC: 102 MG/DL — HIGH (ref 70–99)
HCT VFR BLD CALC: 26.7 % — LOW (ref 39–50)
HGB BLD-MCNC: 8.8 G/DL — LOW (ref 13–17)
IMM GRANULOCYTES NFR BLD AUTO: 0.2 % — SIGNIFICANT CHANGE UP (ref 0–0.9)
LYMPHOCYTES # BLD AUTO: 1.24 K/UL — SIGNIFICANT CHANGE UP (ref 1–3.3)
LYMPHOCYTES # BLD AUTO: 25.8 % — SIGNIFICANT CHANGE UP (ref 13–44)
MCHC RBC-ENTMCNC: 30.2 PG — SIGNIFICANT CHANGE UP (ref 27–34)
MCHC RBC-ENTMCNC: 33 GM/DL — SIGNIFICANT CHANGE UP (ref 32–36)
MCV RBC AUTO: 91.8 FL — SIGNIFICANT CHANGE UP (ref 80–100)
MONOCYTES # BLD AUTO: 0.43 K/UL — SIGNIFICANT CHANGE UP (ref 0–0.9)
MONOCYTES NFR BLD AUTO: 9 % — SIGNIFICANT CHANGE UP (ref 2–14)
NEUTROPHILS # BLD AUTO: 2.85 K/UL — SIGNIFICANT CHANGE UP (ref 1.8–7.4)
NEUTROPHILS NFR BLD AUTO: 59.4 % — SIGNIFICANT CHANGE UP (ref 43–77)
PLATELET # BLD AUTO: 93 K/UL — LOW (ref 150–400)
POTASSIUM SERPL-MCNC: 4.1 MMOL/L — SIGNIFICANT CHANGE UP (ref 3.5–5.3)
POTASSIUM SERPL-SCNC: 4.1 MMOL/L — SIGNIFICANT CHANGE UP (ref 3.5–5.3)
RBC # BLD: 2.91 M/UL — LOW (ref 4.2–5.8)
RBC # FLD: 14.6 % — HIGH (ref 10.3–14.5)
SODIUM SERPL-SCNC: 145 MMOL/L — SIGNIFICANT CHANGE UP (ref 135–145)
WBC # BLD: 4.8 K/UL — SIGNIFICANT CHANGE UP (ref 3.8–10.5)
WBC # FLD AUTO: 4.8 K/UL — SIGNIFICANT CHANGE UP (ref 3.8–10.5)

## 2024-06-20 PROCEDURE — 35206 REPAIR BLOOD VESSEL DIR UXTR: CPT | Mod: GC,LT

## 2024-06-20 DEVICE — SURGIFLO MATRIX WITH THROMBIN KIT: Type: IMPLANTABLE DEVICE | Site: LEFT | Status: FUNCTIONAL

## 2024-06-20 RX ORDER — PANTOPRAZOLE SODIUM 40 MG/10ML
40 INJECTION, POWDER, FOR SOLUTION INTRAVENOUS
Refills: 0 | Status: DISCONTINUED | OUTPATIENT
Start: 2024-06-20 | End: 2024-06-21

## 2024-06-20 RX ORDER — HEPARIN SODIUM 50 [USP'U]/ML
5000 INJECTION, SOLUTION INTRAVENOUS EVERY 8 HOURS
Refills: 0 | Status: DISCONTINUED | OUTPATIENT
Start: 2024-06-20 | End: 2024-06-21

## 2024-06-20 RX ORDER — SODIUM CHLORIDE 0.9 % (FLUSH) 0.9 %
3 SYRINGE (ML) INJECTION EVERY 8 HOURS
Refills: 0 | Status: DISCONTINUED | OUTPATIENT
Start: 2024-06-20 | End: 2024-06-20

## 2024-06-20 RX ORDER — TORSEMIDE 20 MG/1
20 TABLET ORAL AT BEDTIME
Refills: 0 | Status: DISCONTINUED | OUTPATIENT
Start: 2024-06-20 | End: 2024-06-21

## 2024-06-20 RX ORDER — TAMSULOSIN HYDROCHLORIDE 0.4 MG/1
0.4 CAPSULE ORAL AT BEDTIME
Refills: 0 | Status: DISCONTINUED | OUTPATIENT
Start: 2024-06-20 | End: 2024-06-21

## 2024-06-20 RX ORDER — ACETAMINOPHEN 325 MG
650 TABLET ORAL EVERY 6 HOURS
Refills: 0 | Status: DISCONTINUED | OUTPATIENT
Start: 2024-06-20 | End: 2024-06-21

## 2024-06-20 RX ORDER — OXYCODONE HYDROCHLORIDE 100 MG/5ML
10 SOLUTION ORAL EVERY 4 HOURS
Refills: 0 | Status: DISCONTINUED | OUTPATIENT
Start: 2024-06-20 | End: 2024-06-21

## 2024-06-20 RX ORDER — OXYCODONE HYDROCHLORIDE 100 MG/5ML
5 SOLUTION ORAL EVERY 4 HOURS
Refills: 0 | Status: DISCONTINUED | OUTPATIENT
Start: 2024-06-20 | End: 2024-06-21

## 2024-06-20 RX ORDER — ISOSORBIDE MONONITRATE 60 MG/1
1 TABLET, EXTENDED RELEASE ORAL
Refills: 0 | DISCHARGE

## 2024-06-20 RX ORDER — FENTANYL CITRATE 50 UG/ML
25 INJECTION, SOLUTION INTRAMUSCULAR; INTRAVENOUS
Refills: 0 | Status: DISCONTINUED | OUTPATIENT
Start: 2024-06-20 | End: 2024-06-20

## 2024-06-20 RX ORDER — CLONIDINE HYDROCHLORIDE 0.3 MG/1
0.2 TABLET ORAL EVERY 12 HOURS
Refills: 0 | Status: DISCONTINUED | OUTPATIENT
Start: 2024-06-20 | End: 2024-06-21

## 2024-06-20 RX ORDER — ACETAMINOPHEN 325 MG
975 TABLET ORAL ONCE
Refills: 0 | Status: COMPLETED | OUTPATIENT
Start: 2024-06-20 | End: 2024-06-20

## 2024-06-20 RX ORDER — ONDANSETRON HYDROCHLORIDE 2 MG/ML
4 INJECTION INTRAMUSCULAR; INTRAVENOUS ONCE
Refills: 0 | Status: DISCONTINUED | OUTPATIENT
Start: 2024-06-20 | End: 2024-06-20

## 2024-06-20 RX ORDER — DEXTROSE MONOHYDRATE AND SODIUM CHLORIDE 5; .3 G/100ML; G/100ML
1000 INJECTION, SOLUTION INTRAVENOUS
Refills: 0 | Status: DISCONTINUED | OUTPATIENT
Start: 2024-06-20 | End: 2024-06-20

## 2024-06-20 RX ORDER — HYDROMORPHONE HCL 0.2 MG/ML
0.5 INJECTION, SOLUTION INTRAVENOUS EVERY 6 HOURS
Refills: 0 | Status: DISCONTINUED | OUTPATIENT
Start: 2024-06-20 | End: 2024-06-21

## 2024-06-20 RX ORDER — LEVETIRACETAM 100 MG/ML
1000 INJECTION INTRAVENOUS EVERY 12 HOURS
Refills: 0 | Status: DISCONTINUED | OUTPATIENT
Start: 2024-06-20 | End: 2024-06-21

## 2024-06-20 RX ORDER — ATORVASTATIN CALCIUM 20 MG/1
40 TABLET, FILM COATED ORAL AT BEDTIME
Refills: 0 | Status: DISCONTINUED | OUTPATIENT
Start: 2024-06-20 | End: 2024-06-21

## 2024-06-20 RX ORDER — CARVEDILOL PHOSPHATE 80 MG/1
3.12 CAPSULE, EXTENDED RELEASE ORAL EVERY 12 HOURS
Refills: 0 | Status: DISCONTINUED | OUTPATIENT
Start: 2024-06-20 | End: 2024-06-21

## 2024-06-20 RX ORDER — FENTANYL CITRATE 50 UG/ML
50 INJECTION, SOLUTION INTRAMUSCULAR; INTRAVENOUS
Refills: 0 | Status: DISCONTINUED | OUTPATIENT
Start: 2024-06-20 | End: 2024-06-20

## 2024-06-20 RX ADMIN — OXYCODONE HYDROCHLORIDE 10 MILLIGRAM(S): 100 SOLUTION ORAL at 17:10

## 2024-06-20 RX ADMIN — CARVEDILOL PHOSPHATE 3.12 MILLIGRAM(S): 80 CAPSULE, EXTENDED RELEASE ORAL at 17:57

## 2024-06-20 RX ADMIN — Medication 3 MILLILITER(S): at 06:20

## 2024-06-20 RX ADMIN — FENTANYL CITRATE 50 MICROGRAM(S): 50 INJECTION, SOLUTION INTRAMUSCULAR; INTRAVENOUS at 13:37

## 2024-06-20 RX ADMIN — LEVETIRACETAM 1000 MILLIGRAM(S): 100 INJECTION INTRAVENOUS at 17:59

## 2024-06-20 RX ADMIN — HEPARIN SODIUM 5000 UNIT(S): 50 INJECTION, SOLUTION INTRAVENOUS at 23:46

## 2024-06-20 RX ADMIN — HYDROMORPHONE HCL 0.5 MILLIGRAM(S): 0.2 INJECTION, SOLUTION INTRAVENOUS at 20:13

## 2024-06-20 RX ADMIN — Medication 975 MILLIGRAM(S): at 06:05

## 2024-06-20 RX ADMIN — OXYCODONE HYDROCHLORIDE 10 MILLIGRAM(S): 100 SOLUTION ORAL at 16:37

## 2024-06-20 RX ADMIN — ATORVASTATIN CALCIUM 40 MILLIGRAM(S): 20 TABLET, FILM COATED ORAL at 23:55

## 2024-06-20 RX ADMIN — OXYCODONE HYDROCHLORIDE 10 MILLIGRAM(S): 100 SOLUTION ORAL at 23:59

## 2024-06-20 RX ADMIN — HYDROMORPHONE HCL 0.5 MILLIGRAM(S): 0.2 INJECTION, SOLUTION INTRAVENOUS at 21:13

## 2024-06-20 RX ADMIN — TORSEMIDE 20 MILLIGRAM(S): 20 TABLET ORAL at 23:55

## 2024-06-20 RX ADMIN — FENTANYL CITRATE 50 MICROGRAM(S): 50 INJECTION, SOLUTION INTRAMUSCULAR; INTRAVENOUS at 12:33

## 2024-06-20 RX ADMIN — TAMSULOSIN HYDROCHLORIDE 0.4 MILLIGRAM(S): 0.4 CAPSULE ORAL at 23:55

## 2024-06-20 RX ADMIN — FENTANYL CITRATE 50 MICROGRAM(S): 50 INJECTION, SOLUTION INTRAMUSCULAR; INTRAVENOUS at 13:33

## 2024-06-20 RX ADMIN — CLONIDINE HYDROCHLORIDE 0.2 MILLIGRAM(S): 0.3 TABLET ORAL at 17:58

## 2024-06-21 ENCOUNTER — TRANSCRIPTION ENCOUNTER (OUTPATIENT)
Age: 64
End: 2024-06-21

## 2024-06-21 VITALS
TEMPERATURE: 98 F | OXYGEN SATURATION: 97 % | SYSTOLIC BLOOD PRESSURE: 175 MMHG | HEART RATE: 61 BPM | DIASTOLIC BLOOD PRESSURE: 65 MMHG | RESPIRATION RATE: 18 BRPM

## 2024-06-21 LAB
ALBUMIN SERPL ELPH-MCNC: 4.2 G/DL — SIGNIFICANT CHANGE UP (ref 3.3–5.2)
ALP SERPL-CCNC: 82 U/L — SIGNIFICANT CHANGE UP (ref 40–120)
ALT FLD-CCNC: 16 U/L — SIGNIFICANT CHANGE UP
ANION GAP SERPL CALC-SCNC: 15 MMOL/L — SIGNIFICANT CHANGE UP (ref 5–17)
AST SERPL-CCNC: 11 U/L — SIGNIFICANT CHANGE UP
BASOPHILS # BLD AUTO: 0.02 K/UL — SIGNIFICANT CHANGE UP (ref 0–0.2)
BASOPHILS NFR BLD AUTO: 0.3 % — SIGNIFICANT CHANGE UP (ref 0–2)
BILIRUB SERPL-MCNC: 0.8 MG/DL — SIGNIFICANT CHANGE UP (ref 0.4–2)
BUN SERPL-MCNC: 50.8 MG/DL — HIGH (ref 8–20)
CALCIUM SERPL-MCNC: 8.6 MG/DL — SIGNIFICANT CHANGE UP (ref 8.4–10.5)
CHLORIDE SERPL-SCNC: 101 MMOL/L — SIGNIFICANT CHANGE UP (ref 96–108)
CO2 SERPL-SCNC: 23 MMOL/L — SIGNIFICANT CHANGE UP (ref 22–29)
CREAT SERPL-MCNC: 6.38 MG/DL — HIGH (ref 0.5–1.3)
EGFR: 9 ML/MIN/1.73M2 — LOW
EOSINOPHIL # BLD AUTO: 0.01 K/UL — SIGNIFICANT CHANGE UP (ref 0–0.5)
EOSINOPHIL NFR BLD AUTO: 0.1 % — SIGNIFICANT CHANGE UP (ref 0–6)
GLUCOSE SERPL-MCNC: 108 MG/DL — HIGH (ref 70–99)
GRAM STN FLD: SIGNIFICANT CHANGE UP
HCT VFR BLD CALC: 24.7 % — LOW (ref 39–50)
HGB BLD-MCNC: 8.1 G/DL — LOW (ref 13–17)
IMM GRANULOCYTES NFR BLD AUTO: 0.4 % — SIGNIFICANT CHANGE UP (ref 0–0.9)
LYMPHOCYTES # BLD AUTO: 0.72 K/UL — LOW (ref 1–3.3)
LYMPHOCYTES # BLD AUTO: 9.5 % — LOW (ref 13–44)
MAGNESIUM SERPL-MCNC: 1.7 MG/DL — SIGNIFICANT CHANGE UP (ref 1.6–2.6)
MCHC RBC-ENTMCNC: 30.1 PG — SIGNIFICANT CHANGE UP (ref 27–34)
MCHC RBC-ENTMCNC: 32.8 GM/DL — SIGNIFICANT CHANGE UP (ref 32–36)
MCV RBC AUTO: 91.8 FL — SIGNIFICANT CHANGE UP (ref 80–100)
MONOCYTES # BLD AUTO: 0.42 K/UL — SIGNIFICANT CHANGE UP (ref 0–0.9)
MONOCYTES NFR BLD AUTO: 5.6 % — SIGNIFICANT CHANGE UP (ref 2–14)
NEUTROPHILS # BLD AUTO: 6.35 K/UL — SIGNIFICANT CHANGE UP (ref 1.8–7.4)
NEUTROPHILS NFR BLD AUTO: 84.1 % — HIGH (ref 43–77)
PHOSPHATE SERPL-MCNC: 4 MG/DL — SIGNIFICANT CHANGE UP (ref 2.4–4.7)
PLATELET # BLD AUTO: 108 K/UL — LOW (ref 150–400)
POTASSIUM SERPL-MCNC: 4.2 MMOL/L — SIGNIFICANT CHANGE UP (ref 3.5–5.3)
POTASSIUM SERPL-SCNC: 4.2 MMOL/L — SIGNIFICANT CHANGE UP (ref 3.5–5.3)
PROT SERPL-MCNC: 7 G/DL — SIGNIFICANT CHANGE UP (ref 6.6–8.7)
RBC # BLD: 2.69 M/UL — LOW (ref 4.2–5.8)
RBC # FLD: 14.6 % — HIGH (ref 10.3–14.5)
SODIUM SERPL-SCNC: 139 MMOL/L — SIGNIFICANT CHANGE UP (ref 135–145)
SPECIMEN SOURCE: SIGNIFICANT CHANGE UP
WBC # BLD: 7.55 K/UL — SIGNIFICANT CHANGE UP (ref 3.8–10.5)
WBC # FLD AUTO: 7.55 K/UL — SIGNIFICANT CHANGE UP (ref 3.8–10.5)

## 2024-06-21 PROCEDURE — 84100 ASSAY OF PHOSPHORUS: CPT

## 2024-06-21 PROCEDURE — 86880 COOMBS TEST DIRECT: CPT

## 2024-06-21 PROCEDURE — 86901 BLOOD TYPING SEROLOGIC RH(D): CPT

## 2024-06-21 PROCEDURE — 85025 COMPLETE CBC W/AUTO DIFF WBC: CPT

## 2024-06-21 PROCEDURE — 87075 CULTR BACTERIA EXCEPT BLOOD: CPT

## 2024-06-21 PROCEDURE — 93005 ELECTROCARDIOGRAM TRACING: CPT

## 2024-06-21 PROCEDURE — 86850 RBC ANTIBODY SCREEN: CPT

## 2024-06-21 PROCEDURE — 93010 ELECTROCARDIOGRAM REPORT: CPT

## 2024-06-21 PROCEDURE — 80053 COMPREHEN METABOLIC PANEL: CPT

## 2024-06-21 PROCEDURE — 86900 BLOOD TYPING SEROLOGIC ABO: CPT

## 2024-06-21 PROCEDURE — 36415 COLL VENOUS BLD VENIPUNCTURE: CPT

## 2024-06-21 PROCEDURE — 80048 BASIC METABOLIC PNL TOTAL CA: CPT

## 2024-06-21 PROCEDURE — 87070 CULTURE OTHR SPECIMN AEROBIC: CPT

## 2024-06-21 PROCEDURE — 35011 REPAIR DEFECT OF ARTERY: CPT

## 2024-06-21 PROCEDURE — C1889: CPT

## 2024-06-21 PROCEDURE — 83735 ASSAY OF MAGNESIUM: CPT

## 2024-06-21 RX ORDER — ALPRAZOLAM 2 MG/1
0.25 TABLET ORAL ONCE
Refills: 0 | Status: DISCONTINUED | OUTPATIENT
Start: 2024-06-21 | End: 2024-06-21

## 2024-06-21 RX ORDER — ACETAMINOPHEN 325 MG
2 TABLET ORAL
Qty: 16 | Refills: 0
Start: 2024-06-21 | End: 2024-06-22

## 2024-06-21 RX ORDER — OXYCODONE HYDROCHLORIDE 100 MG/5ML
1 SOLUTION ORAL
Qty: 15 | Refills: 0
Start: 2024-06-21

## 2024-06-21 RX ORDER — ATORVASTATIN CALCIUM 80 MG/1
1 TABLET, FILM COATED ORAL
Refills: 0 | DISCHARGE

## 2024-06-21 RX ORDER — METOPROLOL TARTRATE 50 MG
5 TABLET ORAL ONCE
Refills: 0 | Status: COMPLETED | OUTPATIENT
Start: 2024-06-21 | End: 2024-06-21

## 2024-06-21 RX ADMIN — Medication 5 MILLIGRAM(S): at 12:28

## 2024-06-21 RX ADMIN — OXYCODONE HYDROCHLORIDE 10 MILLIGRAM(S): 100 SOLUTION ORAL at 10:51

## 2024-06-21 RX ADMIN — OXYCODONE HYDROCHLORIDE 10 MILLIGRAM(S): 100 SOLUTION ORAL at 00:45

## 2024-06-21 RX ADMIN — HYDROMORPHONE HCL 0.5 MILLIGRAM(S): 0.2 INJECTION, SOLUTION INTRAVENOUS at 02:28

## 2024-06-21 RX ADMIN — HEPARIN SODIUM 5000 UNIT(S): 50 INJECTION, SOLUTION INTRAVENOUS at 07:03

## 2024-06-21 RX ADMIN — CLONIDINE HYDROCHLORIDE 0.2 MILLIGRAM(S): 0.3 TABLET ORAL at 06:01

## 2024-06-21 RX ADMIN — ALPRAZOLAM 0.25 MILLIGRAM(S): 2 TABLET ORAL at 09:44

## 2024-06-21 RX ADMIN — CARVEDILOL PHOSPHATE 3.12 MILLIGRAM(S): 80 CAPSULE, EXTENDED RELEASE ORAL at 06:01

## 2024-06-21 RX ADMIN — PANTOPRAZOLE SODIUM 40 MILLIGRAM(S): 40 INJECTION, POWDER, FOR SOLUTION INTRAVENOUS at 06:01

## 2024-06-21 RX ADMIN — HYDROMORPHONE HCL 0.5 MILLIGRAM(S): 0.2 INJECTION, SOLUTION INTRAVENOUS at 02:58

## 2024-06-21 RX ADMIN — LEVETIRACETAM 1000 MILLIGRAM(S): 100 INJECTION INTRAVENOUS at 06:01

## 2024-06-21 NOTE — PATIENT PROFILE ADULT - HAS THE PATIENT USED TOBACCO IN THE PAST 30 DAYS?
6/21/2024      Muriel Short  [address] [address] [place] [id number]      Dear Colleague,    Thank you for referring your patient, Muriel Short, to the [facility] [place]. Please see a copy of my visit note below.    Harsh is a 22 years old patient status post bilateral mastectomies with no nipple grafting.  Patient is almost 6 weeks out from surgery.  Patient is doing well.    At the physical exam, all incisions are well-healed.  Good shape and definition bilaterally.  No sign of keloids.                  Plan: Patient is happy with results.  Resume all normal activities.  Follow-up as needed.    Daniel Munoz MD , FACS   Diplomate American Board of Plastic Surgery  Diplomate American Board of Surgery  Adj. Assistant Professor of Surgery  Division of Plastic & Reconstructive Surgery   [facility]  Office: [phone]   6/21/2024 at 10:08 AM       Again, thank you for allowing me to participate in the care of your patient.        Sincerely,        Daniel Munoz MD   No

## 2024-06-25 LAB
CULTURE RESULTS: SIGNIFICANT CHANGE UP
SPECIMEN SOURCE: SIGNIFICANT CHANGE UP

## 2024-06-26 LAB
CULTURE RESULTS: SIGNIFICANT CHANGE UP
SPECIMEN SOURCE: SIGNIFICANT CHANGE UP

## 2024-06-28 ENCOUNTER — APPOINTMENT (OUTPATIENT)
Dept: VASCULAR SURGERY | Facility: CLINIC | Age: 64
End: 2024-06-28
Payer: MEDICARE

## 2024-06-28 VITALS
SYSTOLIC BLOOD PRESSURE: 187 MMHG | BODY MASS INDEX: 24.1 KG/M2 | OXYGEN SATURATION: 97 % | WEIGHT: 159 LBS | DIASTOLIC BLOOD PRESSURE: 80 MMHG | TEMPERATURE: 98 F | HEART RATE: 75 BPM | HEIGHT: 68 IN | RESPIRATION RATE: 16 BRPM

## 2024-06-28 VITALS — DIASTOLIC BLOOD PRESSURE: 70 MMHG | SYSTOLIC BLOOD PRESSURE: 168 MMHG

## 2024-06-28 DIAGNOSIS — I72.9 ANEURYSM OF UNSPECIFIED SITE: ICD-10-CM

## 2024-06-28 DIAGNOSIS — I72.1 ANEURYSM OF ARTERY OF UPPER EXTREMITY: ICD-10-CM

## 2024-06-28 PROCEDURE — 99024 POSTOP FOLLOW-UP VISIT: CPT

## 2024-07-12 ENCOUNTER — APPOINTMENT (OUTPATIENT)
Dept: VASCULAR SURGERY | Facility: CLINIC | Age: 64
End: 2024-07-12
Payer: MEDICARE

## 2024-07-12 VITALS
HEIGHT: 68 IN | TEMPERATURE: 98.3 F | RESPIRATION RATE: 16 BRPM | DIASTOLIC BLOOD PRESSURE: 75 MMHG | HEART RATE: 58 BPM | BODY MASS INDEX: 24.1 KG/M2 | OXYGEN SATURATION: 96 % | SYSTOLIC BLOOD PRESSURE: 175 MMHG | WEIGHT: 159.03 LBS

## 2024-07-12 DIAGNOSIS — N18.9 CHRONIC KIDNEY DISEASE, UNSPECIFIED: ICD-10-CM

## 2024-07-12 PROCEDURE — 93931 UPPER EXTREMITY STUDY: CPT

## 2024-07-12 PROCEDURE — 99213 OFFICE O/P EST LOW 20 MIN: CPT | Mod: 24

## 2024-08-02 ENCOUNTER — INPATIENT (INPATIENT)
Facility: HOSPITAL | Age: 64
LOS: 0 days | Discharge: HOME | DRG: 310 | End: 2024-08-03
Attending: INTERNAL MEDICINE | Admitting: INTERNAL MEDICINE
Payer: MEDICARE

## 2024-08-02 ENCOUNTER — TRANSCRIPTION ENCOUNTER (OUTPATIENT)
Age: 64
End: 2024-08-02

## 2024-08-02 ENCOUNTER — RESULT REVIEW (OUTPATIENT)
Age: 64
End: 2024-08-02

## 2024-08-02 VITALS
HEIGHT: 67 IN | HEART RATE: 47 BPM | TEMPERATURE: 97 F | WEIGHT: 160.94 LBS | OXYGEN SATURATION: 99 % | SYSTOLIC BLOOD PRESSURE: 153 MMHG | RESPIRATION RATE: 20 BRPM | DIASTOLIC BLOOD PRESSURE: 93 MMHG

## 2024-08-02 DIAGNOSIS — Z94.0 KIDNEY TRANSPLANT STATUS: Chronic | ICD-10-CM

## 2024-08-02 DIAGNOSIS — Z95.1 PRESENCE OF AORTOCORONARY BYPASS GRAFT: Chronic | ICD-10-CM

## 2024-08-02 DIAGNOSIS — Z86.79 PERSONAL HISTORY OF OTHER DISEASES OF THE CIRCULATORY SYSTEM: Chronic | ICD-10-CM

## 2024-08-02 DIAGNOSIS — Z90.49 ACQUIRED ABSENCE OF OTHER SPECIFIED PARTS OF DIGESTIVE TRACT: Chronic | ICD-10-CM

## 2024-08-02 DIAGNOSIS — I77.0 ARTERIOVENOUS FISTULA, ACQUIRED: Chronic | ICD-10-CM

## 2024-08-02 DIAGNOSIS — Z95.2 PRESENCE OF PROSTHETIC HEART VALVE: Chronic | ICD-10-CM

## 2024-08-02 DIAGNOSIS — I48.91 UNSPECIFIED ATRIAL FIBRILLATION: ICD-10-CM

## 2024-08-02 LAB
ANION GAP SERPL CALC-SCNC: 14 MMOL/L — SIGNIFICANT CHANGE UP (ref 5–17)
BUN SERPL-MCNC: 29.3 MG/DL — HIGH (ref 8–20)
CALCIUM SERPL-MCNC: 8.5 MG/DL — SIGNIFICANT CHANGE UP (ref 8.4–10.5)
CHLORIDE SERPL-SCNC: 101 MMOL/L — SIGNIFICANT CHANGE UP (ref 96–108)
CO2 SERPL-SCNC: 28 MMOL/L — SIGNIFICANT CHANGE UP (ref 22–29)
CREAT SERPL-MCNC: 5.3 MG/DL — HIGH (ref 0.5–1.3)
EGFR: 11 ML/MIN/1.73M2 — LOW
GLUCOSE SERPL-MCNC: 94 MG/DL — SIGNIFICANT CHANGE UP (ref 70–99)
HCT VFR BLD CALC: 22.8 % — LOW (ref 39–50)
HGB BLD-MCNC: 7.6 G/DL — LOW (ref 13–17)
MCHC RBC-ENTMCNC: 31.9 PG — SIGNIFICANT CHANGE UP (ref 27–34)
MCHC RBC-ENTMCNC: 33.3 GM/DL — SIGNIFICANT CHANGE UP (ref 32–36)
MCV RBC AUTO: 95.8 FL — SIGNIFICANT CHANGE UP (ref 80–100)
PLATELET # BLD AUTO: 106 K/UL — LOW (ref 150–400)
POTASSIUM SERPL-MCNC: 3.7 MMOL/L — SIGNIFICANT CHANGE UP (ref 3.5–5.3)
POTASSIUM SERPL-SCNC: 3.7 MMOL/L — SIGNIFICANT CHANGE UP (ref 3.5–5.3)
RBC # BLD: 2.38 M/UL — LOW (ref 4.2–5.8)
RBC # FLD: 14.1 % — SIGNIFICANT CHANGE UP (ref 10.3–14.5)
SODIUM SERPL-SCNC: 143 MMOL/L — SIGNIFICANT CHANGE UP (ref 135–145)
WBC # BLD: 4.37 K/UL — SIGNIFICANT CHANGE UP (ref 3.8–10.5)
WBC # FLD AUTO: 4.37 K/UL — SIGNIFICANT CHANGE UP (ref 3.8–10.5)

## 2024-08-02 PROCEDURE — 93355 ECHO TRANSESOPHAGEAL (TEE): CPT

## 2024-08-02 PROCEDURE — 33340 PERQ CLSR TCAT L ATR APNDGE: CPT | Mod: Q0

## 2024-08-02 PROCEDURE — 93010 ELECTROCARDIOGRAM REPORT: CPT

## 2024-08-02 RX ORDER — CARVEDILOL PHOSPHATE 80 MG
6.25 CAPSULE,EXTENDED RELEASE MULTIPHASE 24HR ORAL EVERY 12 HOURS
Refills: 0 | Status: DISCONTINUED | OUTPATIENT
Start: 2024-08-02 | End: 2024-08-03

## 2024-08-02 RX ORDER — ISOSORBIDE MONONITRATE 60 MG/1
30 TABLET, EXTENDED RELEASE ORAL DAILY
Refills: 0 | Status: DISCONTINUED | OUTPATIENT
Start: 2024-08-02 | End: 2024-08-03

## 2024-08-02 RX ORDER — BENZOCAINE AND MENTHOL 5; 1 G/100ML; G/100ML
1 LIQUID ORAL
Refills: 0 | Status: DISCONTINUED | OUTPATIENT
Start: 2024-08-02 | End: 2024-08-03

## 2024-08-02 RX ORDER — AMLODIPINE BESYLATE 2.5 MG/1
10 TABLET ORAL DAILY
Refills: 0 | Status: DISCONTINUED | OUTPATIENT
Start: 2024-08-02 | End: 2024-08-03

## 2024-08-02 RX ORDER — ALPRAZOLAM 0.5 MG
0.25 TABLET ORAL EVERY 8 HOURS
Refills: 0 | Status: DISCONTINUED | OUTPATIENT
Start: 2024-08-02 | End: 2024-08-03

## 2024-08-02 RX ORDER — LEVETIRACETAM 1000 MG/1
1000 TABLET, FILM COATED ORAL
Refills: 0 | Status: DISCONTINUED | OUTPATIENT
Start: 2024-08-02 | End: 2024-08-03

## 2024-08-02 RX ORDER — ACETAMINOPHEN 500 MG
650 TABLET ORAL EVERY 6 HOURS
Refills: 0 | Status: DISCONTINUED | OUTPATIENT
Start: 2024-08-02 | End: 2024-08-03

## 2024-08-02 RX ORDER — APIXABAN 5 MG/1
2.5 TABLET, FILM COATED ORAL
Refills: 0 | Status: DISCONTINUED | OUTPATIENT
Start: 2024-08-02 | End: 2024-08-03

## 2024-08-02 RX ORDER — CLONIDINE 500 UG/ML
0.2 INJECTION, SOLUTION EPIDURAL
Refills: 0 | Status: DISCONTINUED | OUTPATIENT
Start: 2024-08-02 | End: 2024-08-03

## 2024-08-02 RX ORDER — OXYCODONE HYDROCHLORIDE 30 MG/1
5 TABLET ORAL EVERY 6 HOURS
Refills: 0 | Status: DISCONTINUED | OUTPATIENT
Start: 2024-08-02 | End: 2024-08-03

## 2024-08-02 RX ORDER — MAGNESIUM, ALUMINUM HYDROXIDE 200-225/5
30 SUSPENSION, ORAL (FINAL DOSE FORM) ORAL EVERY 4 HOURS
Refills: 0 | Status: DISCONTINUED | OUTPATIENT
Start: 2024-08-02 | End: 2024-08-03

## 2024-08-02 RX ORDER — ATORVASTATIN CALCIUM 40 MG/1
40 TABLET, FILM COATED ORAL AT BEDTIME
Refills: 0 | Status: DISCONTINUED | OUTPATIENT
Start: 2024-08-02 | End: 2024-08-03

## 2024-08-02 RX ORDER — TAMSULOSIN HCL 0.4 MG
0.4 CAPSULE ORAL AT BEDTIME
Refills: 0 | Status: DISCONTINUED | OUTPATIENT
Start: 2024-08-02 | End: 2024-08-03

## 2024-08-02 RX ORDER — ONDANSETRON HCL/PF 4 MG/2 ML
4 VIAL (ML) INJECTION EVERY 6 HOURS
Refills: 0 | Status: DISCONTINUED | OUTPATIENT
Start: 2024-08-02 | End: 2024-08-03

## 2024-08-02 RX ORDER — HYDRALAZINE HYDROCHLORIDE 100 MG/1
50 TABLET ORAL EVERY 8 HOURS
Refills: 0 | Status: DISCONTINUED | OUTPATIENT
Start: 2024-08-02 | End: 2024-08-03

## 2024-08-02 RX ORDER — TORSEMIDE 20 MG
20 TABLET ORAL DAILY
Refills: 0 | Status: DISCONTINUED | OUTPATIENT
Start: 2024-08-02 | End: 2024-08-03

## 2024-08-02 RX ADMIN — ISOSORBIDE MONONITRATE 30 MILLIGRAM(S): 60 TABLET, EXTENDED RELEASE ORAL at 20:13

## 2024-08-02 RX ADMIN — LEVETIRACETAM 1000 MILLIGRAM(S): 1000 TABLET, FILM COATED ORAL at 18:15

## 2024-08-02 RX ADMIN — OXYCODONE HYDROCHLORIDE 5 MILLIGRAM(S): 30 TABLET ORAL at 19:28

## 2024-08-02 RX ADMIN — APIXABAN 2.5 MILLIGRAM(S): 5 TABLET, FILM COATED ORAL at 21:51

## 2024-08-02 RX ADMIN — OXYCODONE HYDROCHLORIDE 5 MILLIGRAM(S): 30 TABLET ORAL at 17:55

## 2024-08-02 RX ADMIN — Medication 0.25 MILLIGRAM(S): at 22:40

## 2024-08-02 RX ADMIN — CLONIDINE 0.2 MILLIGRAM(S): 500 INJECTION, SOLUTION EPIDURAL at 21:51

## 2024-08-02 RX ADMIN — Medication 0.4 MILLIGRAM(S): at 21:50

## 2024-08-02 RX ADMIN — HYDRALAZINE HYDROCHLORIDE 50 MILLIGRAM(S): 100 TABLET ORAL at 21:51

## 2024-08-02 RX ADMIN — ATORVASTATIN CALCIUM 40 MILLIGRAM(S): 40 TABLET, FILM COATED ORAL at 21:50

## 2024-08-02 NOTE — PROGRESS NOTE ADULT - SUBJECTIVE AND OBJECTIVE BOX
64-year-old male with a history of atrial fibrillation/flutter s/p PVI+CTI+mitral line in 9/2021, CAD s/p CABG, valve disease s/p AVR and MV repair, mild LV dysfunction (now recovered), aortic dissection s/p repair, CKD/ESRD HD on Tu/TH/Sat, small bowel ischemia s/p resection and h/o subdural/subarachnoid hemorrhage while on eliquis, who presents today electively for a LAAO with a Watchman device with Dr. Trevino / Jaylin. Pt was previously planned for a LAAO with Watchman device but he unfortunately developed mitral valve endocarditis and LAAO procedure had to be postponed. His L AVF has since been removed as it was thought to be the source of infection, and he has since had a R sided permacath placed. He  completed antibiotic therapy for the endocarditis and MUSTAPHA demonstrated resolution as well. Subsequently when he arrived for 4/2024 Watchman procedure he was found to have  SANTOS and hypertensive urgency, required 1 unit of PRBC and LAAO deferred once again. He has since been seen by GI with no c/o melena, BRBPR and stable H/H therefore colonoscopy deferred, likely cause was SANTOS/ATN. Of note had pseudoaneurysm of left brachial fistula which was excised and repaired still with weak signal, plan for revision post Watchman. Pt reports his last dialysis session was Thursday and he is next scheduled for tomorrow afternoon. Pt confirms last PO intake was > 8 hours PTA and he is w/o complaints at time of assessment. Will maintain NPO status and continue to monitor pending procedure.

## 2024-08-02 NOTE — DISCHARGE NOTE PROVIDER - HOSPITAL COURSE
64-year-old male with a history of atrial fibrillation/flutter s/p PVI+CTI+mitral line in 9/2021, CAD s/p CABG, valve disease s/p AVR and MV repair, mild LV dysfunction (now recovered), aortic dissection s/p repair, CKD/ESRD HD on Tu/TH/Sat, small bowel ischemia s/p resection and h/o subdural/subarachnoid hemorrhage while on eliquis, who presented electively today 8/2/24 for and is now s/p  BLAINE occlusion with Watchman device implant (27mm). Access obtained via R sided femoral vein, hemostasis achieved via R sided groin figure of 8 suture. Denies any complaints post procedure. 64-year-old male with a history of atrial fibrillation/flutter s/p PVI+CTI+mitral line in 9/2021, CAD s/p CABG, valve disease s/p AVR and MV repair, mild LV dysfunction (now recovered), aortic dissection s/p repair, CKD/ESRD HD on Tu/TH/Sat, small bowel ischemia s/p resection and h/o subdural/subarachnoid hemorrhage while on eliquis, who presented electively today 8/2/24 for and is now s/p  BLAINE occlusion with Watchman device implant (27mm). Access obtained via R sided femoral vein, hemostasis achieved via R sided groin figure of 8 suture. Denies any complaints post procedure.   Pre-operative Hg 7.6. Patient recieved 1unit of PRBC and responded appropriately, with repeat Hg 8.6. The patient was observed overnight without event and was discharged home the following morning with a plan for outpatient follow up.

## 2024-08-02 NOTE — DISCHARGE NOTE PROVIDER - NSDCMRMEDTOKEN_GEN_ALL_CORE_FT
amLODIPine 10 mg oral tablet: 1 tab(s) orally once a day  atorvastatin 40 mg oral tablet: 1 tab(s) orally once a day (at bedtime)  carvedilol 6.25 mg oral tablet: 1 tab(s) orally every 12 hours  cloNIDine 0.2 mg oral tablet: 1 tab(s) orally 2 times a day  hydrALAZINE 50 mg oral tablet: 1 tab(s) orally every 8 hours  isosorbide mononitrate 30 mg oral tablet, extended release: 1 tab(s) orally once a day  levETIRAcetam 1000 mg oral tablet: 1 tab(s) orally 2 times a day  Nephro-Russel oral tablet: 1 tab(s) orally once a day  tamsulosin 0.4 mg oral capsule: 1 cap(s) orally once a day (at bedtime)  torsemide 20 mg oral tablet: 1 tab(s) orally once a day   amLODIPine 10 mg oral tablet: 1 tab(s) orally once a day  atorvastatin 40 mg oral tablet: 1 tab(s) orally once a day (at bedtime)  carvedilol 6.25 mg oral tablet: 1 tab(s) orally every 12 hours  cloNIDine 0.2 mg oral tablet: 1 tab(s) orally 2 times a day  Eliquis 2.5 mg oral tablet: 1 tab(s) orally 2 times a day Take one tablet twice daily  hydrALAZINE 50 mg oral tablet: 1 tab(s) orally every 8 hours  isosorbide mononitrate 30 mg oral tablet, extended release: 1 tab(s) orally once a day  levETIRAcetam 1000 mg oral tablet: 1 tab(s) orally 2 times a day  Nephro-Russel oral tablet: 1 tab(s) orally once a day  tamsulosin 0.4 mg oral capsule: 1 cap(s) orally once a day (at bedtime)  torsemide 20 mg oral tablet: 1 tab(s) orally once a day

## 2024-08-02 NOTE — DISCHARGE NOTE PROVIDER - NSDCFUADDINST_GEN_ALL_CORE_FT
Follow up with Dr. Mosqueda in 2-4 weeks. Our office will contact you in 3-5 days to schedule this appointment. Please call [phone] with questions or concerns.  Repeat Transesophageal echocardiogram to be arranged in 45 days Start Eliquis 2.5mg BID.  Follow up with Dr. Mosqueda in 2-4 weeks. Our office will contact you in 3-5 days to schedule this appointment. Please call [phone] with questions or concerns.  Repeat Transesophageal echocardiogram to be arranged in 45 days

## 2024-08-02 NOTE — PROGRESS NOTE ADULT - SUBJECTIVE AND OBJECTIVE BOX
Admission Criteria  Please admit the patient to the following service: CARDIOLOGY      Major Criteria:    - Continuous EKG monitoring is required for condition causing arrhythmia (hyperkalemia, etc)  - Significant volume load > 200 ml      Admit to: 4BKT    Patient is being admitted to the inpatient service due to high risk characteristics and need for further management/monitoring and is considered to be at a significantly increased risk of major adverse cardiac and vascular events if discharged.

## 2024-08-02 NOTE — DISCHARGE NOTE PROVIDER - NSDCCPTREATMENT_GEN_ALL_CORE_FT
PRINCIPAL PROCEDURE  Procedure: Insertion, Watchman left atrial appendage closure device  Findings and Treatment: - Bruising at the groin, sometimes extending down the leg, and/or a small lump under the skin at the groin access site is normal and will resolve within 2 – 3 weeks.   - Occasional skipped beats or palpitations that last for a few beats are common and generally resolve within 1-2 months.   - You may walk and take stairs at a regular pace.   - Do not perform any exercise more strenuous than walking for 1 week.   - Do not strain or lift heavy objects for 1 week.  - You may shower the day after the procedure.  - Do not soak in water (such as tub baths, hot tubs, swimming, etc.) for 1 week.   - You may resume all other activities the day after the procedure.  Call your doctor if:   - you notice bleeding, redness, drainage, swelling, increased tenderness or a hot sensation around the catheter insertion site.   - your temperature is greater than 100 degrees F for more than 24 hours.  - your rapid heart rhythm returns.  - you have any questions or concerns regarding the procedure.  If significant bleeding and/or a large lump (the size of a golf ball or bigger) occurs:  - Lie flat and apply continuous direct pressure just above the puncture site for at least 10 minutes  - If the issue resolves, notify your physician immediately.    - If the bleeding cannot be controlled, please seek immediate medical attention.  If you experience increased difficulty breathing or chest pain, or if you faint or have dizzy spells, please seek immediate medical attention.

## 2024-08-02 NOTE — DISCHARGE NOTE PROVIDER - CARE PROVIDER_API CALL
Ramin Mosqueda  Cardiac Electrophysiology  [address] [place] [address]  Phone: [phone]  Fax: [phone]  Follow Up Time:

## 2024-08-02 NOTE — PROGRESS NOTE ADULT - SUBJECTIVE AND OBJECTIVE BOX
PROCEDURE: BLAINE Occlusion via Watchman Device (27mm)    ELECTROPHYSIOLOGIST: Ramin Mosqueda MD         COMPLICATIONS:  none        DISPOSITION:  observation     CONDITION: stable      Pt doing well s/p BLAINE occlusion with Watchman device implant (27mm). Access obtained via R sided femoral vein, hemostasis achieved via R sided groin figure of 8 suture. Denies any complaints post procedure.       MEDICATIONS  (STANDING):  amLODIPine   Tablet 10 milliGRAM(s) Oral daily  apixaban 2.5 milliGRAM(s) Oral <User Schedule>  atorvastatin 40 milliGRAM(s) Oral at bedtime  carvedilol 6.25 milliGRAM(s) Oral every 12 hours  cloNIDine 0.2 milliGRAM(s) Oral two times a day  hydrALAZINE 50 milliGRAM(s) Oral every 8 hours  isosorbide   mononitrate ER Tablet (IMDUR) 30 milliGRAM(s) Oral daily  levETIRAcetam 1000 milliGRAM(s) Oral two times a day  tamsulosin 0.4 milliGRAM(s) Oral at bedtime  torsemide 20 milliGRAM(s) Oral daily    MEDICATIONS  (PRN):  acetaminophen     Tablet .. 650 milliGRAM(s) Oral every 6 hours PRN Moderate Pain (4 - 6)  ALPRAZolam 0.25 milliGRAM(s) Oral every 8 hours PRN anxiety / insomnia  aluminum hydroxide/magnesium hydroxide/simethicone Suspension 30 milliLiter(s) Oral every 4 hours PRN Dyspepsia  benzocaine/menthol Lozenge 1 Lozenge Oral every 2 hours PRN Sore Throat  ondansetron Injectable 4 milliGRAM(s) IV Push every 6 hours PRN Nausea and/or Vomiting  oxyCODONE    IR 5 milliGRAM(s) Oral every 6 hours PRN Severe Pain (7 - 10)      Allergies    penicillin (Other)    Intolerances          VS:   T(C): 36.3 (08-02-24 @ 10:01), Max: 36.3 (08-02-24 @ 10:01)  HR: 43 (08-02-24 @ 15:50) (43 - 47)  BP: 163/64 (08-02-24 @ 15:50) (153/93 - 163/64)  RR: 17 (08-02-24 @ 15:50) (16 - 20)  SpO2: 100% (08-02-24 @ 15:50) (99% - 100%)    Post procedure VS:  HR: 42  BP: 163/64  RR: 18  SpO2: 98    Exam:  VSS, NAD, A&O x 3  Card: S1/S2, RRR, no m/g/r  Resp: lungs CTA b/l  Abd: S/NT/ND  Groin (right only): hemostatic suture in place; site C/D/I; no bleeding, hematoma, erythema, exudate or edema  Ext: no edema; distal pulses intact    ECG: Sinus bradycardia with intact conduction.    Assessment:   64-year-old male with a history of atrial fibrillation/flutter s/p PVI+CTI+mitral line in 9/2021, CAD s/p CABG, valve disease s/p AVR and MV repair, mild LV dysfunction (now recovered), aortic dissection s/p repair, CKD/ESRD HD on Tu/TH/Sat, small bowel ischemia s/p resection and h/o subdural/subarachnoid hemorrhage while on eliquis, who presented electively today 8/2/24 for and is now s/p  BLAINE occlusion with Watchman device implant (27mm). Access obtained via R sided femoral vein, hemostasis achieved via R sided groin figure of 8 suture. Denies any complaints post procedure.       Plan:   Bedrest x 6 hours, then OOB with assistance and progress as tolerated.   Groin suture to be removed by EP service in AM.   Pending groin status: Start Eliquis 2.5mg Q12HR @ 2130.   Will transfuse 2 units of PRBC for pre-op Hgb of 7.6  Repeat H/H in AM  Continue other home medications.   Strict I/Os.  Please encourage incentive spirometry and ambulation once able.  Observation and monitoring on telemetry overnight with anticipated discharge in the AM and outpt follow up in 1 month.   Follow up MUSTAPHA in 45 days. Will consider starting Aspirin alone in 6 months. PROCEDURE: BLAINE Occlusion via Watchman Device (27mm)    ELECTROPHYSIOLOGIST: Ramin Mosqueda MD         COMPLICATIONS:  none        DISPOSITION:  observation     CONDITION: stable      Pt doing well s/p BLAINE occlusion with Watchman device implant (27mm). Access obtained via R sided femoral vein, hemostasis achieved via R sided groin figure of 8 suture. Denies any complaints post procedure.       MEDICATIONS  (STANDING):  amLODIPine   Tablet 10 milliGRAM(s) Oral daily  apixaban 2.5 milliGRAM(s) Oral <User Schedule>  atorvastatin 40 milliGRAM(s) Oral at bedtime  carvedilol 6.25 milliGRAM(s) Oral every 12 hours  cloNIDine 0.2 milliGRAM(s) Oral two times a day  hydrALAZINE 50 milliGRAM(s) Oral every 8 hours  isosorbide   mononitrate ER Tablet (IMDUR) 30 milliGRAM(s) Oral daily  levETIRAcetam 1000 milliGRAM(s) Oral two times a day  tamsulosin 0.4 milliGRAM(s) Oral at bedtime  torsemide 20 milliGRAM(s) Oral daily    MEDICATIONS  (PRN):  acetaminophen     Tablet .. 650 milliGRAM(s) Oral every 6 hours PRN Moderate Pain (4 - 6)  ALPRAZolam 0.25 milliGRAM(s) Oral every 8 hours PRN anxiety / insomnia  aluminum hydroxide/magnesium hydroxide/simethicone Suspension 30 milliLiter(s) Oral every 4 hours PRN Dyspepsia  benzocaine/menthol Lozenge 1 Lozenge Oral every 2 hours PRN Sore Throat  ondansetron Injectable 4 milliGRAM(s) IV Push every 6 hours PRN Nausea and/or Vomiting  oxyCODONE    IR 5 milliGRAM(s) Oral every 6 hours PRN Severe Pain (7 - 10)      Allergies    penicillin (Other)    Intolerances          VS:   T(C): 36.3 (08-02-24 @ 10:01), Max: 36.3 (08-02-24 @ 10:01)  HR: 43 (08-02-24 @ 15:50) (43 - 47)  BP: 163/64 (08-02-24 @ 15:50) (153/93 - 163/64)  RR: 17 (08-02-24 @ 15:50) (16 - 20)  SpO2: 100% (08-02-24 @ 15:50) (99% - 100%)    Post procedure VS:  HR: 42  BP: 163/64  RR: 18  SpO2: 98    Exam:  VSS, NAD, A&O x 3  Card: S1/S2, RRR, no m/g/r  Resp: lungs CTA b/l  Abd: S/NT/ND  Groin (right only): hemostatic suture in place; site C/D/I; no bleeding, hematoma, erythema, exudate or edema  Ext: no edema; distal pulses intact    ECG: Sinus bradycardia with intact conduction.    Assessment:   64-year-old male with a history of atrial fibrillation/flutter s/p PVI+CTI+mitral line in 9/2021, CAD s/p CABG, valve disease s/p AVR and MV repair, mild LV dysfunction (now recovered), aortic dissection s/p repair, CKD/ESRD HD on Tu/TH/Sat, small bowel ischemia s/p resection and h/o subdural/subarachnoid hemorrhage while on eliquis, who presented electively today 8/2/24 for and is now s/p  BLAINE occlusion with Watchman device implant (27mm). Access obtained via R sided femoral vein, hemostasis achieved via R sided groin figure of 8 suture. Denies any complaints post procedure.       Plan:   Bedrest x 6 hours, then OOB with assistance and progress as tolerated.   Groin suture to be removed by EP service in AM.   Pending groin status: Start Eliquis 2.5mg Q12HR @ 2130.   Will transfuse 2 units of PRBC for pre-op Hgb of 7.6  Repeat H/H in AM  Outpatient dialysis tomorrow   Continue other home medications.   Strict I/Os.  Please encourage incentive spirometry and ambulation once able.  Observation and monitoring on telemetry overnight with anticipated discharge in the AM and outpt follow up in 1 month.   Follow up MUSTAPHA in 45 days. Will consider starting Aspirin alone in 6 months. PROCEDURE: BLAINE Occlusion via Watchman Device (27mm)    ELECTROPHYSIOLOGIST: Ramin Mosqueda MD         COMPLICATIONS:  none        DISPOSITION:  observation     CONDITION: stable      Pt doing well s/p BLAINE occlusion with Watchman device implant (27mm). Access obtained via R sided femoral vein, hemostasis achieved via R sided groin figure of 8 suture. Denies any complaints post procedure.       MEDICATIONS  (STANDING):  amLODIPine   Tablet 10 milliGRAM(s) Oral daily  apixaban 2.5 milliGRAM(s) Oral <User Schedule>  atorvastatin 40 milliGRAM(s) Oral at bedtime  carvedilol 6.25 milliGRAM(s) Oral every 12 hours  cloNIDine 0.2 milliGRAM(s) Oral two times a day  hydrALAZINE 50 milliGRAM(s) Oral every 8 hours  isosorbide   mononitrate ER Tablet (IMDUR) 30 milliGRAM(s) Oral daily  levETIRAcetam 1000 milliGRAM(s) Oral two times a day  tamsulosin 0.4 milliGRAM(s) Oral at bedtime  torsemide 20 milliGRAM(s) Oral daily    MEDICATIONS  (PRN):  acetaminophen     Tablet .. 650 milliGRAM(s) Oral every 6 hours PRN Moderate Pain (4 - 6)  ALPRAZolam 0.25 milliGRAM(s) Oral every 8 hours PRN anxiety / insomnia  aluminum hydroxide/magnesium hydroxide/simethicone Suspension 30 milliLiter(s) Oral every 4 hours PRN Dyspepsia  benzocaine/menthol Lozenge 1 Lozenge Oral every 2 hours PRN Sore Throat  ondansetron Injectable 4 milliGRAM(s) IV Push every 6 hours PRN Nausea and/or Vomiting  oxyCODONE    IR 5 milliGRAM(s) Oral every 6 hours PRN Severe Pain (7 - 10)      Allergies    penicillin (Other)    Intolerances          VS:   T(C): 36.3 (08-02-24 @ 10:01), Max: 36.3 (08-02-24 @ 10:01)  HR: 43 (08-02-24 @ 15:50) (43 - 47)  BP: 163/64 (08-02-24 @ 15:50) (153/93 - 163/64)  RR: 17 (08-02-24 @ 15:50) (16 - 20)  SpO2: 100% (08-02-24 @ 15:50) (99% - 100%)    Post procedure VS:  HR: 42  BP: 163/64  RR: 18  SpO2: 98    Exam:  VSS, NAD, A&O x 3  Card: S1/S2, RRR, no m/g/r  Resp: lungs CTA b/l  Abd: S/NT/ND  Groin (right only): hemostatic suture in place; site C/D/I; no bleeding, hematoma, erythema, exudate or edema  Ext: no edema; distal pulses intact    ECG: Sinus bradycardia with intact conduction.    Assessment:   64-year-old male with a history of atrial fibrillation/flutter s/p PVI+CTI+mitral line in 9/2021, CAD s/p CABG, valve disease s/p AVR and MV repair, mild LV dysfunction (now recovered), aortic dissection s/p repair, CKD/ESRD HD on Tu/TH/Sat, small bowel ischemia s/p resection and h/o subdural/subarachnoid hemorrhage while on eliquis, who presented electively today 8/2/24 for and is now s/p  BLAINE occlusion with Watchman device implant (27mm). Access obtained via R sided femoral vein, hemostasis achieved via R sided groin figure of 8 suture. Denies any complaints post procedure.       Plan:   Bedrest x 6 hours, then OOB with assistance and progress as tolerated.   Groin suture to be removed by EP service in AM.   Pending groin status: Start Eliquis 2.5mg Q12HR @ 2130.   Will transfuse 1 unit of PRBCs for pre-op Hgb of 7.6  Repeat H/H in AM  Outpatient dialysis tomorrow   Continue other home medications.   Strict I/Os.  Please encourage incentive spirometry and ambulation once able.  Observation and monitoring on telemetry overnight with anticipated discharge in the AM and outpt follow up in 1 month.   Follow up MUSTAPHA in 45 days. Will consider starting Aspirin alone in 6 months.

## 2024-08-02 NOTE — DISCHARGE NOTE PROVIDER - NSDCFUSCHEDAPPT_GEN_ALL_CORE_FT
Brianne To  [facility] Physician [facility]  CARDIOLOGY [address]  Scheduled Appointment: 08/27/2024

## 2024-08-02 NOTE — DISCHARGE NOTE PROVIDER - NSDCQMAMI_CARD_ALL_CORE
Problem: Patient Care Overview (Adult)  Goal: Plan of Care Review  Outcome: Ongoing (interventions implemented as appropriate)  i still feel tired        No

## 2024-08-03 ENCOUNTER — TRANSCRIPTION ENCOUNTER (OUTPATIENT)
Age: 64
End: 2024-08-03

## 2024-08-03 VITALS
RESPIRATION RATE: 17 BRPM | HEART RATE: 58 BPM | OXYGEN SATURATION: 96 % | SYSTOLIC BLOOD PRESSURE: 188 MMHG | DIASTOLIC BLOOD PRESSURE: 74 MMHG

## 2024-08-03 LAB
ANION GAP SERPL CALC-SCNC: 17 MMOL/L — SIGNIFICANT CHANGE UP (ref 5–17)
BUN SERPL-MCNC: 32.7 MG/DL — HIGH (ref 8–20)
CALCIUM SERPL-MCNC: 8.6 MG/DL — SIGNIFICANT CHANGE UP (ref 8.4–10.5)
CHLORIDE SERPL-SCNC: 100 MMOL/L — SIGNIFICANT CHANGE UP (ref 96–108)
CO2 SERPL-SCNC: 24 MMOL/L — SIGNIFICANT CHANGE UP (ref 22–29)
CREAT SERPL-MCNC: 5.98 MG/DL — HIGH (ref 0.5–1.3)
EGFR: 10 ML/MIN/1.73M2 — LOW
GLUCOSE SERPL-MCNC: 113 MG/DL — HIGH (ref 70–99)
HCT VFR BLD CALC: 25.4 % — LOW (ref 39–50)
HGB BLD-MCNC: 8.6 G/DL — LOW (ref 13–17)
MAGNESIUM SERPL-MCNC: 1.9 MG/DL — SIGNIFICANT CHANGE UP (ref 1.6–2.6)
MCHC RBC-ENTMCNC: 32.1 PG — SIGNIFICANT CHANGE UP (ref 27–34)
MCHC RBC-ENTMCNC: 33.9 GM/DL — SIGNIFICANT CHANGE UP (ref 32–36)
MCV RBC AUTO: 94.8 FL — SIGNIFICANT CHANGE UP (ref 80–100)
PLATELET # BLD AUTO: 114 K/UL — LOW (ref 150–400)
POTASSIUM SERPL-MCNC: 4 MMOL/L — SIGNIFICANT CHANGE UP (ref 3.5–5.3)
POTASSIUM SERPL-SCNC: 4 MMOL/L — SIGNIFICANT CHANGE UP (ref 3.5–5.3)
RBC # BLD: 2.68 M/UL — LOW (ref 4.2–5.8)
RBC # FLD: 13.8 % — SIGNIFICANT CHANGE UP (ref 10.3–14.5)
SODIUM SERPL-SCNC: 141 MMOL/L — SIGNIFICANT CHANGE UP (ref 135–145)
WBC # BLD: 5.12 K/UL — SIGNIFICANT CHANGE UP (ref 3.8–10.5)
WBC # FLD AUTO: 5.12 K/UL — SIGNIFICANT CHANGE UP (ref 3.8–10.5)

## 2024-08-03 PROCEDURE — 93010 ELECTROCARDIOGRAM REPORT: CPT

## 2024-08-03 RX ORDER — APIXABAN 5 MG/1
1 TABLET, FILM COATED ORAL
Qty: 60 | Refills: 1
Start: 2024-08-03 | End: 2024-10-01

## 2024-08-03 RX ADMIN — OXYCODONE HYDROCHLORIDE 5 MILLIGRAM(S): 30 TABLET ORAL at 02:16

## 2024-08-03 RX ADMIN — APIXABAN 2.5 MILLIGRAM(S): 5 TABLET, FILM COATED ORAL at 08:08

## 2024-08-03 RX ADMIN — CLONIDINE 0.2 MILLIGRAM(S): 500 INJECTION, SOLUTION EPIDURAL at 05:28

## 2024-08-03 RX ADMIN — OXYCODONE HYDROCHLORIDE 5 MILLIGRAM(S): 30 TABLET ORAL at 03:15

## 2024-08-03 RX ADMIN — HYDRALAZINE HYDROCHLORIDE 50 MILLIGRAM(S): 100 TABLET ORAL at 05:28

## 2024-08-03 RX ADMIN — Medication 20 MILLIGRAM(S): at 05:28

## 2024-08-03 RX ADMIN — LEVETIRACETAM 1000 MILLIGRAM(S): 1000 TABLET, FILM COATED ORAL at 05:28

## 2024-08-03 RX ADMIN — AMLODIPINE BESYLATE 10 MILLIGRAM(S): 2.5 TABLET ORAL at 05:28

## 2024-08-03 NOTE — PROGRESS NOTE ADULT - SUBJECTIVE AND OBJECTIVE BOX
Pt doing well POD #1 s/pLAA Occlusion via Watchman Device (27mm). Denies complaint.     TELE:   SB and sinus rhythm @ 50-90bpm, occasional unifocal PVCS    MEDICATIONS  (STANDING):  amLODIPine   Tablet 10 milliGRAM(s) Oral daily  apixaban 2.5 milliGRAM(s) Oral <User Schedule>  atorvastatin 40 milliGRAM(s) Oral at bedtime  carvedilol 6.25 milliGRAM(s) Oral every 12 hours  cloNIDine 0.2 milliGRAM(s) Oral two times a day  hydrALAZINE 50 milliGRAM(s) Oral every 8 hours  isosorbide   mononitrate ER Tablet (IMDUR) 30 milliGRAM(s) Oral daily  levETIRAcetam 1000 milliGRAM(s) Oral two times a day  tamsulosin 0.4 milliGRAM(s) Oral at bedtime  torsemide 20 milliGRAM(s) Oral daily    MEDICATIONS  (PRN):  acetaminophen     Tablet .. 650 milliGRAM(s) Oral every 6 hours PRN Moderate Pain (4 - 6)  ALPRAZolam 0.25 milliGRAM(s) Oral every 8 hours PRN anxiety / insomnia  aluminum hydroxide/magnesium hydroxide/simethicone Suspension 30 milliLiter(s) Oral every 4 hours PRN Dyspepsia  benzocaine/menthol Lozenge 1 Lozenge Oral every 2 hours PRN Sore Throat  ondansetron Injectable 4 milliGRAM(s) IV Push every 6 hours PRN Nausea and/or Vomiting  oxyCODONE    IR 5 milliGRAM(s) Oral every 6 hours PRN Severe Pain (7 - 10)      Allergies  penicillin (Other)          PAST MEDICAL & SURGICAL HISTORY:  CHF (congestive heart failure)      CVA (cerebral vascular accident)      Seizures  last seizure 10 years ago      HTN (hypertension)      Hyperlipemia      COVID-19  october 2020      Atrial fibrillation      Former smoker      History of cocaine use  remote hx, currently sober      H/O aortic dissection  s/p repair (2010), surgery complicated by bowel ischemia s/p bowel resection and ostomy with reversal      H/O aortic valve insufficiency      Mitral regurgitation      GIB (gastrointestinal bleeding)      CAD (coronary artery disease)  s/p PCI 1998  and CABG x 2 11/2020      Chronic atrial fibrillation      Aneurysm of artery of upper extremity      Anemia of chronic disease      H/O colectomy      Status post double vessel coronary artery bypass  11/2020 Dr Butler      S/P AVR (aortic valve replacement)  (t)AVR 11/2020 Dr Butler      S/P MVR (mitral valve replacement)  (t)MVR 11/2020 Dr Butler      H/O aortic dissection  Type A; emergent repair 2010      AV fistula  LUE      History of renal transplant          Vital Signs Last 24 Hrs  T(C): 36.6 (03 Aug 2024 05:20), Max: 36.9 (02 Aug 2024 20:55)  T(F): 97.9 (03 Aug 2024 05:20), Max: 98.4 (02 Aug 2024 20:55)  HR: 52 (03 Aug 2024 05:20) (42 - 76)  BP: 171/70 (03 Aug 2024 05:20) (144/66 - 186/85)  BP(mean): --  RR: 17 (03 Aug 2024 05:20) (16 - 20)  SpO2: 96% (03 Aug 2024 05:20) (95% - 100%)    Parameters below as of 03 Aug 2024 05:20  Patient On (Oxygen Delivery Method): room air        Physical Exam:  Constitutional: NAD, AAOx3  Cardiovascular: +S1S2 RRR  Pulmonary: CTA b/l, unlabored  Abd: soft NTND +BS  Groins: C/D/I bilaterally; no bleeding, hematoma, edema  Extremities: no pedal edema, +distal pulses b/l  Neuro: CN II-XII grossly intact, CORDOVA x4     LABS:                        8.6    5.12  )-----------( 114      ( 03 Aug 2024 05:20 )             25.4     08-03    141  |  100  |  32.7<H>  ----------------------------<  113<H>  4.0   |  24.0  |  5.98<H>    Ca    8.6      03 Aug 2024 05:20  Mg     1.9     08-03        Urinalysis Basic - ( 03 Aug 2024 05:20 )    Color: x / Appearance: x / SG: x / pH: x  Gluc: 113 mg/dL / Ketone: x  / Bili: x / Urobili: x   Blood: x / Protein: x / Nitrite: x   Leuk Esterase: x / RBC: x / WBC x   Sq Epi: x / Non Sq Epi: x / Bacteria: x        Assessment:   64-year-old male with a history of atrial fibrillation/flutter s/p PVI+CTI+mitral line in 9/2021, CAD s/p CABG, valve disease s/p AVR and MV repair, mild LV dysfunction (now recovered), aortic dissection s/p repair, CKD/ESRD HD on Tu/TH/Sat, small bowel ischemia s/p resection and h/o subdural/subarachnoid hemorrhage while on eliquis, who presented electively today 8/2/24 for and is now s/p  BLAINE occlusion with Watchman device implant (27mm). Access obtained via R sided femoral vein, hemostasis achieved via R sided groin figure of 8 suture.   No acute events overnight.   RFV suture removed without incident.    Plan:   Started on Eliquis 2.5mg BID.  Preop Hgb of 7.6.   Given 1uPRBC and repeat Hg this AM 8.6  Planned for outpatient dialysis tonday  Continue other home medications.   Pt instructed as to activity limitations - no lifting/pushing/pulling >10 lbs or strenuous exercise x 1 week.   Pt instructed as to access site care and f/up - written instructions included in d/c documents.  Outpt f/up in 2-4 weeks - office will contact pt to schedule.  Follow up MUSTAPHA in 45 days. Will consider starting Aspirin alone in 6 months.

## 2024-08-03 NOTE — PATIENT PROFILE ADULT - FALL HARM RISK - HARM RISK INTERVENTIONS

## 2024-08-03 NOTE — DISCHARGE NOTE NURSING/CASE MANAGEMENT/SOCIAL WORK - NSDCVIVACCINE_GEN_ALL_CORE_FT
influenza, injectable, quadrivalent, preservative free; 12-Dec-2020 09:28; Lynn Gerardo); [employer]; [id number] (Exp. Date: 30-Jun-2021); IntraMuscular; Deltoid Right.; 0.5 milliLiter(s); VIS (VIS Published: 15-Aug-2019, VIS Presented: 12-Dec-2020);

## 2024-08-03 NOTE — DISCHARGE NOTE NURSING/CASE MANAGEMENT/SOCIAL WORK - PATIENT PORTAL LINK FT
You can access the FollowMyHealth Patient Portal offered by [facility] by registering at the following website: [email or web address]. By joining [email or web address]’s FollowMyHealth portal, you will also be able to view your health information using other applications (apps) compatible with our system.

## 2024-08-16 PROBLEM — Z92.29 PERSONAL HISTORY OF OTHER DRUG THERAPY: Chronic | Status: ACTIVE | Noted: 2024-08-13

## 2024-08-16 PROBLEM — N18.6 END STAGE RENAL DISEASE: Chronic | Status: ACTIVE | Noted: 2024-08-13

## 2024-08-16 PROBLEM — I21.9 ACUTE MYOCARDIAL INFARCTION, UNSPECIFIED: Chronic | Status: ACTIVE | Noted: 2024-08-13

## 2024-08-16 PROBLEM — Z95.828 PRESENCE OF OTHER VASCULAR IMPLANTS AND GRAFTS: Chronic | Status: ACTIVE | Noted: 2024-08-13

## 2024-08-21 ENCOUNTER — APPOINTMENT (OUTPATIENT)
Dept: CARDIOLOGY | Facility: CLINIC | Age: 64
End: 2024-08-21
Payer: MEDICARE

## 2024-08-21 ENCOUNTER — TRANSCRIPTION ENCOUNTER (OUTPATIENT)
Age: 64
End: 2024-08-21

## 2024-08-21 VITALS — SYSTOLIC BLOOD PRESSURE: 188 MMHG | DIASTOLIC BLOOD PRESSURE: 98 MMHG

## 2024-08-21 VITALS
BODY MASS INDEX: 23.64 KG/M2 | HEART RATE: 70 BPM | SYSTOLIC BLOOD PRESSURE: 218 MMHG | HEIGHT: 68 IN | OXYGEN SATURATION: 99 % | DIASTOLIC BLOOD PRESSURE: 84 MMHG | WEIGHT: 156 LBS

## 2024-08-21 DIAGNOSIS — Z98.890 OTHER SPECIFIED POSTPROCEDURAL STATES: ICD-10-CM

## 2024-08-21 DIAGNOSIS — Z86.79 OTHER SPECIFIED POSTPROCEDURAL STATES: ICD-10-CM

## 2024-08-21 DIAGNOSIS — Z01.810 ENCOUNTER FOR PREPROCEDURAL CARDIOVASCULAR EXAMINATION: ICD-10-CM

## 2024-08-21 DIAGNOSIS — N18.9 CHRONIC KIDNEY DISEASE, UNSPECIFIED: ICD-10-CM

## 2024-08-21 DIAGNOSIS — I48.91 UNSPECIFIED ATRIAL FIBRILLATION: ICD-10-CM

## 2024-08-21 PROBLEM — I25.10 CAD (CORONARY ARTERY DISEASE): Status: ACTIVE | Noted: 2024-08-21

## 2024-08-21 PROCEDURE — 99215 OFFICE O/P EST HI 40 MIN: CPT

## 2024-08-21 PROCEDURE — 93000 ELECTROCARDIOGRAM COMPLETE: CPT | Mod: NC

## 2024-08-22 ENCOUNTER — INPATIENT (INPATIENT)
Facility: HOSPITAL | Age: 64
LOS: 0 days | Discharge: HOME | DRG: 684 | End: 2024-08-23
Attending: SURGERY | Admitting: SURGERY
Payer: MEDICARE

## 2024-08-22 ENCOUNTER — APPOINTMENT (OUTPATIENT)
Dept: VASCULAR SURGERY | Facility: HOSPITAL | Age: 64
End: 2024-08-22

## 2024-08-22 VITALS
OXYGEN SATURATION: 100 % | WEIGHT: 154.32 LBS | TEMPERATURE: 97 F | RESPIRATION RATE: 18 BRPM | DIASTOLIC BLOOD PRESSURE: 80 MMHG | HEIGHT: 68 IN | HEART RATE: 64 BPM | SYSTOLIC BLOOD PRESSURE: 163 MMHG

## 2024-08-22 DIAGNOSIS — I77.0 ARTERIOVENOUS FISTULA, ACQUIRED: Chronic | ICD-10-CM

## 2024-08-22 DIAGNOSIS — Z95.1 PRESENCE OF AORTOCORONARY BYPASS GRAFT: Chronic | ICD-10-CM

## 2024-08-22 DIAGNOSIS — Z90.49 ACQUIRED ABSENCE OF OTHER SPECIFIED PARTS OF DIGESTIVE TRACT: Chronic | ICD-10-CM

## 2024-08-22 DIAGNOSIS — Z95.2 PRESENCE OF PROSTHETIC HEART VALVE: Chronic | ICD-10-CM

## 2024-08-22 DIAGNOSIS — Z95.818 PRESENCE OF OTHER CARDIAC IMPLANTS AND GRAFTS: Chronic | ICD-10-CM

## 2024-08-22 DIAGNOSIS — Z86.79 PERSONAL HISTORY OF OTHER DISEASES OF THE CIRCULATORY SYSTEM: Chronic | ICD-10-CM

## 2024-08-22 DIAGNOSIS — Z94.0 KIDNEY TRANSPLANT STATUS: Chronic | ICD-10-CM

## 2024-08-22 DIAGNOSIS — N18.6 END STAGE RENAL DISEASE: ICD-10-CM

## 2024-08-22 LAB
ALBUMIN SERPL ELPH-MCNC: 3.7 G/DL — SIGNIFICANT CHANGE UP (ref 3.3–5.2)
ALP SERPL-CCNC: 84 U/L — SIGNIFICANT CHANGE UP (ref 40–120)
ALT FLD-CCNC: 10 U/L — SIGNIFICANT CHANGE UP
ANION GAP SERPL CALC-SCNC: 16 MMOL/L — SIGNIFICANT CHANGE UP (ref 5–17)
APTT BLD: 27.8 SEC — SIGNIFICANT CHANGE UP (ref 24.5–35.6)
APTT BLD: 32 SEC — SIGNIFICANT CHANGE UP (ref 24.5–35.6)
AST SERPL-CCNC: 10 U/L — SIGNIFICANT CHANGE UP
BILIRUB SERPL-MCNC: 0.5 MG/DL — SIGNIFICANT CHANGE UP (ref 0.4–2)
BLD GP AB SCN SERPL QL: SIGNIFICANT CHANGE UP
BUN SERPL-MCNC: 45.6 MG/DL — HIGH (ref 8–20)
CALCIUM SERPL-MCNC: 7.7 MG/DL — LOW (ref 8.4–10.5)
CHLORIDE SERPL-SCNC: 100 MMOL/L — SIGNIFICANT CHANGE UP (ref 96–108)
CO2 SERPL-SCNC: 24 MMOL/L — SIGNIFICANT CHANGE UP (ref 22–29)
CREAT SERPL-MCNC: 5.72 MG/DL — HIGH (ref 0.5–1.3)
DIR ANTIGLOB POLYSPECIFIC INTERPRETATION: SIGNIFICANT CHANGE UP
EGFR: 10 ML/MIN/1.73M2 — LOW
GAS PNL BLDA: SIGNIFICANT CHANGE UP
GAS PNL BLDA: SIGNIFICANT CHANGE UP
GLUCOSE BLDC GLUCOMTR-MCNC: 135 MG/DL — HIGH (ref 70–99)
GLUCOSE SERPL-MCNC: 125 MG/DL — HIGH (ref 70–99)
HCT VFR BLD CALC: 28.7 % — LOW (ref 39–50)
HGB BLD-MCNC: 9.4 G/DL — LOW (ref 13–17)
INR BLD: 1.29 RATIO — HIGH (ref 0.85–1.18)
INR BLD: 1.31 RATIO — HIGH (ref 0.85–1.18)
MAGNESIUM SERPL-MCNC: 1.6 MG/DL — SIGNIFICANT CHANGE UP (ref 1.6–2.6)
MCHC RBC-ENTMCNC: 31.3 PG — SIGNIFICANT CHANGE UP (ref 27–34)
MCHC RBC-ENTMCNC: 32.8 GM/DL — SIGNIFICANT CHANGE UP (ref 32–36)
MCV RBC AUTO: 95.7 FL — SIGNIFICANT CHANGE UP (ref 80–100)
PHOSPHATE SERPL-MCNC: 5.3 MG/DL — HIGH (ref 2.4–4.7)
PLATELET # BLD AUTO: 155 K/UL — SIGNIFICANT CHANGE UP (ref 150–400)
POTASSIUM SERPL-MCNC: 3.4 MMOL/L — LOW (ref 3.5–5.3)
POTASSIUM SERPL-SCNC: 3.4 MMOL/L — LOW (ref 3.5–5.3)
PROT SERPL-MCNC: 6.2 G/DL — LOW (ref 6.6–8.7)
PROTHROM AB SERPL-ACNC: 14.2 SEC — HIGH (ref 9.5–13)
PROTHROM AB SERPL-ACNC: 14.4 SEC — HIGH (ref 9.5–13)
RBC # BLD: 3 M/UL — LOW (ref 4.2–5.8)
RBC # FLD: 13.4 % — SIGNIFICANT CHANGE UP (ref 10.3–14.5)
SODIUM SERPL-SCNC: 140 MMOL/L — SIGNIFICANT CHANGE UP (ref 135–145)
TROPONIN T, HIGH SENSITIVITY RESULT: 73 NG/L — HIGH (ref 0–51)
TROPONIN T, HIGH SENSITIVITY RESULT: 74 NG/L — HIGH (ref 0–51)
TROPONIN T, HIGH SENSITIVITY RESULT: 80 NG/L — HIGH (ref 0–51)
WBC # BLD: 9.42 K/UL — SIGNIFICANT CHANGE UP (ref 3.8–10.5)
WBC # FLD AUTO: 9.42 K/UL — SIGNIFICANT CHANGE UP (ref 3.8–10.5)

## 2024-08-22 PROCEDURE — 36825 ARTERY-VEIN AUTOGRAFT: CPT | Mod: GC,58

## 2024-08-22 PROCEDURE — 71045 X-RAY EXAM CHEST 1 VIEW: CPT | Mod: 26

## 2024-08-22 PROCEDURE — 93010 ELECTROCARDIOGRAM REPORT: CPT

## 2024-08-22 DEVICE — SURGIFLO MATRIX WITH THROMBIN KIT: Type: IMPLANTABLE DEVICE | Site: LEFT | Status: FUNCTIONAL

## 2024-08-22 DEVICE — GRAFT VASC PROPATEN 4-7MMX45CM STD WALL LEFT AV: Type: IMPLANTABLE DEVICE | Site: LEFT | Status: FUNCTIONAL

## 2024-08-22 DEVICE — CLIP APPLIER COVIDIEN SURGICLIP III 9" SM: Type: IMPLANTABLE DEVICE | Site: LEFT | Status: FUNCTIONAL

## 2024-08-22 RX ORDER — CHLORHEXIDINE GLUCONATE 40 MG/ML
1 SOLUTION TOPICAL DAILY
Refills: 0 | Status: DISCONTINUED | OUTPATIENT
Start: 2024-08-22 | End: 2024-08-23

## 2024-08-22 RX ORDER — ACETAMINOPHEN 325 MG/1
1000 TABLET ORAL EVERY 6 HOURS
Refills: 0 | Status: DISCONTINUED | OUTPATIENT
Start: 2024-08-22 | End: 2024-08-23

## 2024-08-22 RX ORDER — CLONIDINE HCL 0.2 MG
0.2 TABLET ORAL EVERY 12 HOURS
Refills: 0 | Status: DISCONTINUED | OUTPATIENT
Start: 2024-08-22 | End: 2024-08-23

## 2024-08-22 RX ORDER — PROPOFOL 10 MG/ML
5 INJECTION, EMULSION INTRAVENOUS
Qty: 1000 | Refills: 0 | Status: DISCONTINUED | OUTPATIENT
Start: 2024-08-22 | End: 2024-08-23

## 2024-08-22 RX ORDER — LEVETIRACETAM 1000 MG/1
1000 TABLET ORAL EVERY 12 HOURS
Refills: 0 | Status: DISCONTINUED | OUTPATIENT
Start: 2024-08-22 | End: 2024-08-23

## 2024-08-22 RX ORDER — CEFAZOLIN SODIUM 2 G/100ML
2000 INJECTION, SOLUTION INTRAVENOUS ONCE
Refills: 0 | Status: DISCONTINUED | OUTPATIENT
Start: 2024-08-22 | End: 2024-08-22

## 2024-08-22 RX ORDER — ACETAMINOPHEN 325 MG/1
975 TABLET ORAL ONCE
Refills: 0 | Status: COMPLETED | OUTPATIENT
Start: 2024-08-22 | End: 2024-08-22

## 2024-08-22 RX ORDER — PANTOPRAZOLE SODIUM 40 MG
40 TABLET, DELAYED RELEASE (ENTERIC COATED) ORAL EVERY 12 HOURS
Refills: 0 | Status: DISCONTINUED | OUTPATIENT
Start: 2024-08-22 | End: 2024-08-23

## 2024-08-22 RX ORDER — EPOETIN ALFA 3000 [IU]/ML
10000 SOLUTION INTRAVENOUS; SUBCUTANEOUS ONCE
Refills: 0 | Status: DISCONTINUED | OUTPATIENT
Start: 2024-08-23 | End: 2024-08-23

## 2024-08-22 RX ORDER — AMLODIPINE BESYLATE 10 MG/1
10 TABLET ORAL EVERY 24 HOURS
Refills: 0 | Status: DISCONTINUED | OUTPATIENT
Start: 2024-08-22 | End: 2024-08-23

## 2024-08-22 RX ORDER — HYDROMORPHONE HYDROCHLORIDE 2 MG/1
0.2 TABLET ORAL EVERY 4 HOURS
Refills: 0 | Status: DISCONTINUED | OUTPATIENT
Start: 2024-08-22 | End: 2024-08-23

## 2024-08-22 RX ORDER — HEPARIN SODIUM,BOVINE 1000/ML
5000 VIAL (ML) INJECTION EVERY 8 HOURS
Refills: 0 | Status: DISCONTINUED | OUTPATIENT
Start: 2024-08-22 | End: 2024-08-22

## 2024-08-22 RX ORDER — HYDRALAZINE HCL 50 MG
50 TABLET ORAL EVERY 8 HOURS
Refills: 0 | Status: DISCONTINUED | OUTPATIENT
Start: 2024-08-22 | End: 2024-08-23

## 2024-08-22 RX ORDER — DEXAMETHASONE 0.75 MG
4 TABLET ORAL EVERY 6 HOURS
Refills: 0 | Status: DISCONTINUED | OUTPATIENT
Start: 2024-08-22 | End: 2024-08-23

## 2024-08-22 RX ORDER — FENTANYL CITRATE 50 UG/ML
50 INJECTION INTRAMUSCULAR; INTRAVENOUS ONCE
Refills: 0 | Status: DISCONTINUED | OUTPATIENT
Start: 2024-08-22 | End: 2024-08-22

## 2024-08-22 RX ORDER — APIXABAN 5 MG/1
2.5 TABLET, FILM COATED ORAL EVERY 12 HOURS
Refills: 0 | Status: DISCONTINUED | OUTPATIENT
Start: 2024-08-22 | End: 2024-08-23

## 2024-08-22 RX ORDER — CALCIUM GLUCONATE 61(648) MG
2 TABLET ORAL ONCE
Refills: 0 | Status: COMPLETED | OUTPATIENT
Start: 2024-08-22 | End: 2024-08-22

## 2024-08-22 RX ORDER — POVIDONE-IODINE 10 %
1 SOLUTION, NON-ORAL TOPICAL ONCE
Refills: 0 | Status: DISCONTINUED | OUTPATIENT
Start: 2024-08-22 | End: 2024-08-22

## 2024-08-22 RX ORDER — CHLORHEXIDINE GLUCONATE 40 MG/ML
15 SOLUTION TOPICAL EVERY 12 HOURS
Refills: 0 | Status: DISCONTINUED | OUTPATIENT
Start: 2024-08-22 | End: 2024-08-23

## 2024-08-22 RX ADMIN — Medication 2.5 MILLIGRAM(S): at 15:30

## 2024-08-22 RX ADMIN — Medication 200 GRAM(S): at 15:20

## 2024-08-22 RX ADMIN — Medication 4 MILLIGRAM(S): at 23:22

## 2024-08-22 RX ADMIN — Medication 2.5 MILLIGRAM(S): at 20:41

## 2024-08-22 RX ADMIN — Medication 0.2 MILLIGRAM(S): at 17:12

## 2024-08-22 RX ADMIN — Medication 150 GRAM(S): at 12:48

## 2024-08-22 RX ADMIN — LEVETIRACETAM 1000 MILLIGRAM(S): 1000 TABLET ORAL at 17:12

## 2024-08-22 RX ADMIN — Medication 50 MILLIGRAM(S): at 22:30

## 2024-08-22 RX ADMIN — Medication 40 MILLIGRAM(S): at 22:30

## 2024-08-22 RX ADMIN — Medication 4 MILLIGRAM(S): at 17:12

## 2024-08-22 RX ADMIN — PROPOFOL 2.1 MICROGRAM(S)/KG/MIN: 10 INJECTION, EMULSION INTRAVENOUS at 12:09

## 2024-08-22 RX ADMIN — Medication 40 MILLIGRAM(S): at 17:11

## 2024-08-22 RX ADMIN — FENTANYL CITRATE 50 MICROGRAM(S): 50 INJECTION INTRAMUSCULAR; INTRAVENOUS at 22:30

## 2024-08-22 RX ADMIN — APIXABAN 2.5 MILLIGRAM(S): 5 TABLET, FILM COATED ORAL at 17:12

## 2024-08-22 RX ADMIN — PROPOFOL 2.1 MICROGRAM(S)/KG/MIN: 10 INJECTION, EMULSION INTRAVENOUS at 20:16

## 2024-08-22 RX ADMIN — CHLORHEXIDINE GLUCONATE 1 APPLICATION(S): 40 SOLUTION TOPICAL at 12:48

## 2024-08-22 RX ADMIN — CHLORHEXIDINE GLUCONATE 15 MILLILITER(S): 40 SOLUTION TOPICAL at 18:59

## 2024-08-22 RX ADMIN — FENTANYL CITRATE 50 MICROGRAM(S): 50 INJECTION INTRAMUSCULAR; INTRAVENOUS at 22:10

## 2024-08-22 NOTE — PATIENT PROFILE ADULT - FALL HARM RISK - HARM RISK INTERVENTIONS

## 2024-08-22 NOTE — CONSULT NOTE ADULT - SUBJECTIVE AND OBJECTIVE BOX
Patient is a 64y old  Male who presents with a chief complaint of     HPI:  HPI: "65 y/o male presents today to PST pending LUE AV graft placement with Dr. Prasanna Crawford on 8/22/24 secondary to ESRD. Pt is presently on HD Tues Thurs Sat via right chest wall port, states he has arrangements for HD to go on Friday the week of the procedure. 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 States he needs to have port removed to avoid infection, denies previous infection, states he is having this procedure to establish permanent access. 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 S/p watchman procedure due to history of afib, 8/2024-states he had a recent blood transfusion post op., pt. is on BB and eliquis. History HTN, HLD, CAD s/p PCI, and CABG in past, history of anemia of CKD, history of CHF, history of CVA x 2, with residual left side weakness, denies recent falls, history of GIB in past few years ago, history of aortic dissection and repair in the past, history of seizures last in 2010, history of AVR, MVR, denies history of kidney transplant. History of colectomy due to stomach infection. Denies CP or SOB, denies dizzy or lightheadedness."Patient underwent axillary-brachial graft creation LUE. Post operatively patient was extubated and became hypoxic, requiring reintubation. SICU consulted and patient taken to SICU     Interim noted   Hemodynamically stable and oxygenating well     PAST MEDICAL & SURGICAL HISTORY:  CHF (congestive heart failure)      CVA (cerebral vascular accident)      Seizures  last seizure 10 years ago      HTN (hypertension)      Hyperlipemia      COVID-19  october 2020      Atrial fibrillation      Former smoker      History of cocaine use  remote hx, currently sober      H/O aortic dissection  s/p repair (2010), surgery complicated by bowel ischemia s/p bowel resection and ostomy with reversal      H/O aortic valve insufficiency      Mitral regurgitation      GIB (gastrointestinal bleeding)      CAD (coronary artery disease)  s/p PCI 1998  and CABG x 2 11/2020      Chronic atrial fibrillation      Aneurysm of artery of upper extremity      Anemia of chronic disease      End stage renal disease      Myocardial infarction      COVID-19 vaccine series completed      Port-A-Cath in place      H/O colectomy      Status post double vessel coronary artery bypass  11/2020 Dr Butler      S/P AVR (aortic valve replacement)  (t)AVR 11/2020 Dr Butler      S/P MVR (mitral valve replacement)  (t)MVR 11/2020 Dr Butler      H/O aortic dissection  Type A; emergent repair 2010      AV fistula  LUE      History of renal transplant      Presence of Watchman left atrial appendage closure device          FAMILY HISTORY:  FH: hypertension    Family history of cardiac disorder (Mother)  mother        Social History:    MEDICATIONS  (STANDING):  albuterol    0.083% 2.5 milliGRAM(s) Nebulizer every 6 hours  amLODIPine   Tablet 10 milliGRAM(s) Oral every 24 hours  apixaban 2.5 milliGRAM(s) Oral every 12 hours  atorvastatin 40 milliGRAM(s) Oral at bedtime  chlorhexidine 0.12% Liquid 15 milliLiter(s) Oral Mucosa every 12 hours  chlorhexidine 2% Cloths 1 Application(s) Topical daily  cloNIDine 0.2 milliGRAM(s) Oral every 12 hours  dexAMETHasone  Injectable 4 milliGRAM(s) IV Push every 6 hours  hydrALAZINE 50 milliGRAM(s) Oral every 8 hours  levETIRAcetam   Injectable 1000 milliGRAM(s) IV Push every 12 hours  pantoprazole  Injectable 40 milliGRAM(s) IV Push every 12 hours  propofol Infusion 5 MICROgram(s)/kG/Min (2.1 mL/Hr) IV Continuous <Continuous>    MEDICATIONS  (PRN):  acetaminophen   IVPB .. 1000 milliGRAM(s) IV Intermittent every 6 hours PRN Mild Pain (1 - 3), Moderate Pain (4 - 6), Severe Pain (7 - 10)  HYDROmorphone  Injectable 0.2 milliGRAM(s) IV Push every 4 hours PRN Breakthrough Pain      Allergies    penicillin (Other)    Intolerances              Vital Signs Last 24 Hrs  T(C): 36.3 (22 Aug 2024 06:04), Max: 36.3 (22 Aug 2024 06:04)  T(F): 97.4 (22 Aug 2024 06:04), Max: 97.4 (22 Aug 2024 06:04)  HR: 59 (22 Aug 2024 16:00) (55 - 108)  BP: 137/76 (22 Aug 2024 16:00) (113/65 - 234/108)  BP(mean): 93 (22 Aug 2024 16:00) (78 - 130)  RR: 16 (22 Aug 2024 16:00) (11 - 30)  SpO2: 100% (22 Aug 2024 16:00) (50% - 100%)    Parameters below as of 22 Aug 2024 16:00  Patient On (Oxygen Delivery Method): ventilator    O2 Concentration (%): 30  Daily Height in cm: 172.72 (22 Aug 2024 06:04)    Daily   I&O's Detail    22 Aug 2024 07:01  -  22 Aug 2024 16:52  --------------------------------------------------------  IN:    IV PiggyBack: 50 mL    IV PiggyBack: 100 mL    Propofol: 63 mL  Total IN: 213 mL    OUT:  Total OUT: 0 mL    Total NET: 213 mL        I&O's Summary    22 Aug 2024 07:01  -  22 Aug 2024 16:52  --------------------------------------------------------  IN: 213 mL / OUT: 0 mL / NET: 213 mL        PHYSICAL EXAM:    GENERAL: NAD,   HEAD:  Atraumatic, orally intubated   NECK: Supple, No JVD, R permacath + , No stridor   CHEST/LUNG: EAE , No wheeze   HEART: Regular rate and rhythm; No gallop or rub   ABDOMEN: Soft, Nontender, Nondistended;   EXTREMITIES:  2+ Peripheral Pulses, LUE new AVG - No bleeding , + Thrill         LABS:                        9.4    9.42  )-----------( 155      ( 22 Aug 2024 12:30 )             28.7     08-22    140  |  100  |  45.6<H>  ----------------------------<  125<H>  3.4<L>   |  24.0  |  5.72<H>    Ca    7.7<L>      22 Aug 2024 12:30  Phos  5.3     08-22  Mg     1.6     08-22    TPro  6.2<L>  /  Alb  3.7  /  TBili  0.5  /  DBili  x   /  AST  10  /  ALT  10  /  AlkPhos  84  08-22    PT/INR - ( 22 Aug 2024 12:30 )   PT: 14.4 sec;   INR: 1.31 ratio         PTT - ( 22 Aug 2024 12:30 )  PTT:32.0 sec  Urinalysis Basic - ( 22 Aug 2024 12:30 )    Color: x / Appearance: x / SG: x / pH: x  Gluc: 125 mg/dL / Ketone: x  / Bili: x / Urobili: x   Blood: x / Protein: x / Nitrite: x   Leuk Esterase: x / RBC: x / WBC x   Sq Epi: x / Non Sq Epi: x / Bacteria: x      Magnesium: 1.6 mg/dL (08-22 @ 12:30)  Phosphorus: 5.3 mg/dL (08-22 @ 12:30)    ABG - ( 22 Aug 2024 15:35 )  pH, Arterial: 7.380 pH, Blood: x     /  pCO2: 42    /  pO2: 183   / HCO3: 25    / Base Excess: -0.3  /  SaO2: 100.0                 RADIOLOGY & ADDITIONAL TESTS:

## 2024-08-22 NOTE — AIRWAY REMOVAL NOTE  ADULT & PEDS - REASON ARTIFICAL AIRWAY REMOVED:
From: Yara Westfall  To: Nan Hurtado  Sent: 8/21/2024 12:50 PM CDT  Subject: Effexor / Venaflexine Follow up    I know you recently prescribed generic for Effexor medication. I just started this month. I believe 1 more refill left. Right now no issue this medication. Just want to know if want to me follow up visit for this medication. If I do when do follow up ? (I schedule appt later but plan to cancel it)  Thanks Yara   reason for artificial airway has resolved

## 2024-08-22 NOTE — CONSULT NOTE ADULT - ASSESSMENT
ASSESSMENT: 64y male    Admitting Dx:  Complicated By:  PMHx: S/p watchman procedure due to history of afib, ESRD. Pt is presently on HD Tues Thurs Sat via right chest wall port. HTN, HLD, CAD s/p PCI, and CABG in past, history of anemia of CKD, history of CHF, history of CVA x 2, with residual left side weakness, denies recent falls, history of GIB in past few years ago, history of aortic dissection and repair in the past, history of seizures last in 2010, history of AVR, MVR, denies history of kidney transplant. History of colectomy due to stomach infection.    PLAN:    Neuro:   #  - Multimodal pain regimen  - Sedation?  - Delirium precautions  - Optimize sleep/wake cycle     CV:   #  - Maintain MAP > 65  - Continue hemodynamic monitoring  - Shock state?  - Lactate?  - Vasopressors/Inotropes  - TTE?      Pulm:   #  - Maintain sPO2 >92%  - Mech ventilation?   - Non-invasive ventilation?  - Wean FiO2  - Serial ABG  - CXR?  - Pulmonary toilet, IS, OOBTC    GI/Nutrition:   #  - Diet?  - Tube feeds?  - Protonix ppx?  - Bowel regimen  - Monitor bowel movements    /Renal:   #  - Monitor kidney fxn  - Monitor UOP  - Replete electrolytes PRN (Mg >2, Phos >3, K >4)  - Chapman for strict I&O?  -Voiding spontaneously?      ID:  #  - Monitor fever curve  - Monitor for leukocytosis  - Antibiotics? Dates?  - Cultures?    Endo:  #  - Monitor blood glucose  - Maintain euglycemia, 140-180  - ISS?  - Insulin gtt?  - Thyroid function/meds?    Heme/DVT Prophylaxis:  #  - SCDs  - Monitor H/H  - Chemical prophylaxis? Lovenox/SQH?    Skin:  - Repositioning for DTI prevention while in bed    Ortho:  #   - Weight Bearing Status:  - Appreciate ortho recs    ENT:  #  - Appreciate ENT recs    Vascular:  #  - Neurovascular checks  - Appreciate vascular recs    Lines:  - Peripheral IV's  - Arterial Line?  - Central Line? TLC/Cordis/Shiley?    Dispo:  - Care per SICU    CODE STATUS:  ASSESSMENT: 64y male s/p axillary-brachial graft creation on L side. Complicated by acute hypoxemia, requiring reintubation.     Admitting Dx: ESRD, vascular access creation  Complicated By: hypoxemia  PMHx: S/p watchman procedure due to history of afib, ESRD, HTN, HLD, CAD s/p PCI, and CABG in past, history of CHF, history of CVA x 2, with residual left side weakness, history of GIB, history of aortic dissection and repair in the past, history of seizures last in 2010, history of AVR, MVR, denies history of kidney transplant.    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  - Will consult CTSx about thoracic aortic thrombus  - Continue: Amlodopine 10mg QD, atorvastatin 40mg QD, Clonidine 0.2mg BID, Hydralazine 50mg Q8  - Holding:, Carvedilol 6.25mg BID, Eliquis 2,5mg BID Isosorbide Mononitrate 30mg QD, Torsemide 10mg QD      Pulm:   #Acute hypoxic RF  - Maintain sPO2 >92%  - Mech ventilation AC 16/450/50/6  - Wean FiO2  - Serial ABG  - CXR in AM  - Differential includes laryngospasm vs anaphylaxis  - Continue decadron for possible bronchospasm     GI/Nutrition:   # history of GIB in past few years ago,  - NPO  - Protonix ppx  - Bowel regimen  - Monitor bowel movements    /Renal:   #ESRD  - Monitor kidney fxn  - Monitor UOP  - Replete electrolytes PRN (Mg >2, Phos >3, K >4)  - Chapman for strict I&O  - Dialysis tm as per nephro      ID:  - Monitor fever curve  - Monitor for leukocytosis    Endo:  - Monitor blood glucose  - Maintain euglycemia, 140-180    Heme/DVT Prophylaxis:  - SCDs  - Monitor H/H  -SQH    Skin:  - Repositioning for DTI prevention while in bed    Lines:  - Peripheral IV  - Central Line - RIJ permicath  - L UE graft    Dispo:  - Care per SICU    CODE STATUS:  ASSESSMENT: 64y male s/p axillary-brachial graft creation on L side. Complicated by acute hypoxemia, requiring reintubation.     Admitting Dx: ESRD, vascular access creation  Complicated By: hypoxemia  PMHx: S/p watchman procedure due to history of afib, ESRD, HTN, HLD, CAD s/p PCI, and CABG in past, history of CHF, history of CVA x 2, with residual left side weakness, history of GIB, history of aortic dissection and repair in the past, history of seizures last in 2010, history of AVR, MVR, denies history of kidney transplant.    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  - Will consult CTSx about thoracic aortic thrombus  - Continue: Amlodopine 10mg QD, atorvastatin 40mg QD, Clonidine 0.2mg BID, Hydralazine 50mg Q8  - Holding:, Carvedilol 6.25mg BID, Eliquis 2,5mg BID Isosorbide Mononitrate 30mg QD, Torsemide 10mg QD      Pulm:   #Acute hypoxic RF  - Maintain sPO2 >92%  - Mech ventilation AC 16/450/50/6  - Wean FiO2  - Serial ABG  - CXR in AM  - Differential includes laryngospasm vs anaphylaxis  - Continue decadron for possible bronchospasm     GI/Nutrition:   # history of GIB in past few years ago,  - NPO  - Protonix ppx  - Bowel regimen  - Monitor bowel movements    /Renal:   #ESRD  - Monitor kidney fxn  - Monitor UOP  - Replete electrolytes PRN (Mg >2, Phos >3, K >4)  - Chapman for strict I&O  - Dialysis tm as per nephro    ID:  - Monitor fever curve  - Monitor for leukocytosis    Endo:  - Monitor blood glucose  - Maintain euglycemia, 140-180    Heme/DVT Prophylaxis:  - SCDs  - Monitor H/H  -SQH    Skin:  - Repositioning for DTI prevention while in bed    Lines:  - Peripheral IV  - Central Line - RIJ permicath  - L UE AV graft    Dispo:  - Care per SICU

## 2024-08-22 NOTE — CONSULT NOTE ADULT - ASSESSMENT
ESRD / Resp failure   POD # 0 -->  axillary-brachial graft creation LUE. Post operatively patient was extubated and became hypoxic, requiring reintubation.   No pressing need for acute HD today   Will arrange in am   SICU team follow up appreciated   LUGENEVIEVE AVG patent   CXR here today - clear lung fields ( follow official read )     Anemia - Hgb stable     S/P Watchman 8/2/24 - remains on renal dose Eliquis    ESRD / Resp failure   POD # 0 -->  axillary-brachial graft creation LUE. Post operatively patient was extubated and became hypoxic, requiring reintubation.   No pressing need for acute HD today   Will arrange in am   SICU team follow up appreciated   LUE AVG patent   CXR here today - clear lung fields ( follow official read )     Anemia - Hgb stable   Retacrit with HD     S/P Watchman 8/2/24 - remains on renal dose Eliquis

## 2024-08-23 ENCOUNTER — TRANSCRIPTION ENCOUNTER (OUTPATIENT)
Age: 64
End: 2024-08-23

## 2024-08-23 VITALS
RESPIRATION RATE: 18 BRPM | DIASTOLIC BLOOD PRESSURE: 82 MMHG | OXYGEN SATURATION: 98 % | HEART RATE: 84 BPM | SYSTOLIC BLOOD PRESSURE: 184 MMHG

## 2024-08-23 LAB
ANION GAP SERPL CALC-SCNC: 15 MMOL/L — SIGNIFICANT CHANGE UP (ref 5–17)
BASE EXCESS BLDA CALC-SCNC: -2.8 MMOL/L — LOW (ref -2–3)
BLOOD GAS COMMENTS ARTERIAL: SIGNIFICANT CHANGE UP
BUN SERPL-MCNC: 54.4 MG/DL — HIGH (ref 8–20)
CALCIUM SERPL-MCNC: 8.9 MG/DL — SIGNIFICANT CHANGE UP (ref 8.4–10.5)
CHLORIDE SERPL-SCNC: 101 MMOL/L — SIGNIFICANT CHANGE UP (ref 96–108)
CO2 SERPL-SCNC: 22 MMOL/L — SIGNIFICANT CHANGE UP (ref 22–29)
CREAT SERPL-MCNC: 6.85 MG/DL — HIGH (ref 0.5–1.3)
EGFR: 8 ML/MIN/1.73M2 — LOW
GAS PNL BLDA: SIGNIFICANT CHANGE UP
GLUCOSE BLDC GLUCOMTR-MCNC: 126 MG/DL — HIGH (ref 70–99)
GLUCOSE SERPL-MCNC: 125 MG/DL — HIGH (ref 70–99)
HCO3 BLDA-SCNC: 22 MMOL/L — SIGNIFICANT CHANGE UP (ref 21–28)
HCT VFR BLD CALC: 28.9 % — LOW (ref 39–50)
HGB BLD-MCNC: 9.6 G/DL — LOW (ref 13–17)
HOROWITZ INDEX BLDA+IHG-RTO: 30 — SIGNIFICANT CHANGE UP
MAGNESIUM SERPL-MCNC: 2.6 MG/DL — SIGNIFICANT CHANGE UP (ref 1.6–2.6)
MCHC RBC-ENTMCNC: 31.4 PG — SIGNIFICANT CHANGE UP (ref 27–34)
MCHC RBC-ENTMCNC: 33.2 GM/DL — SIGNIFICANT CHANGE UP (ref 32–36)
MCV RBC AUTO: 94.4 FL — SIGNIFICANT CHANGE UP (ref 80–100)
MRSA PCR RESULT.: SIGNIFICANT CHANGE UP
PCO2 BLDA: 39 MMHG — SIGNIFICANT CHANGE UP (ref 35–48)
PH BLDA: 7.37 — SIGNIFICANT CHANGE UP (ref 7.35–7.45)
PHOSPHATE SERPL-MCNC: 6.8 MG/DL — HIGH (ref 2.4–4.7)
PLATELET # BLD AUTO: 151 K/UL — SIGNIFICANT CHANGE UP (ref 150–400)
PO2 BLDA: 133 MMHG — HIGH (ref 83–108)
POTASSIUM SERPL-MCNC: 4.1 MMOL/L — SIGNIFICANT CHANGE UP (ref 3.5–5.3)
POTASSIUM SERPL-SCNC: 4.1 MMOL/L — SIGNIFICANT CHANGE UP (ref 3.5–5.3)
RBC # BLD: 3.06 M/UL — LOW (ref 4.2–5.8)
RBC # FLD: 13.4 % — SIGNIFICANT CHANGE UP (ref 10.3–14.5)
S AUREUS DNA NOSE QL NAA+PROBE: SIGNIFICANT CHANGE UP
SAO2 % BLDA: 100 % — HIGH (ref 94–98)
SODIUM SERPL-SCNC: 138 MMOL/L — SIGNIFICANT CHANGE UP (ref 135–145)
WBC # BLD: 5.65 K/UL — SIGNIFICANT CHANGE UP (ref 3.8–10.5)
WBC # FLD AUTO: 5.65 K/UL — SIGNIFICANT CHANGE UP (ref 3.8–10.5)

## 2024-08-23 PROCEDURE — 93010 ELECTROCARDIOGRAM REPORT: CPT

## 2024-08-23 RX ORDER — AMLODIPINE BESYLATE 10 MG/1
10 TABLET ORAL EVERY 24 HOURS
Refills: 0 | Status: DISCONTINUED | OUTPATIENT
Start: 2024-08-23 | End: 2024-08-23

## 2024-08-23 RX ORDER — OXYCODONE AND ACETAMINOPHEN 7.5; 325 MG/1; MG/1
1 TABLET ORAL
Qty: 12 | Refills: 0
Start: 2024-08-23 | End: 2024-08-25

## 2024-08-23 RX ORDER — HYDRALAZINE HCL 50 MG
50 TABLET ORAL EVERY 8 HOURS
Refills: 0 | Status: DISCONTINUED | OUTPATIENT
Start: 2024-08-23 | End: 2024-08-23

## 2024-08-23 RX ORDER — CLONIDINE HCL 0.2 MG
0.2 TABLET ORAL EVERY 12 HOURS
Refills: 0 | Status: DISCONTINUED | OUTPATIENT
Start: 2024-08-23 | End: 2024-08-23

## 2024-08-23 RX ORDER — CARVEDILOL 6.25 MG/1
6.25 TABLET ORAL EVERY 12 HOURS
Refills: 0 | Status: DISCONTINUED | OUTPATIENT
Start: 2024-08-23 | End: 2024-08-23

## 2024-08-23 RX ADMIN — Medication 4 MILLIGRAM(S): at 05:36

## 2024-08-23 RX ADMIN — Medication 50 MILLIGRAM(S): at 13:08

## 2024-08-23 RX ADMIN — CARVEDILOL 6.25 MILLIGRAM(S): 6.25 TABLET ORAL at 11:10

## 2024-08-23 RX ADMIN — AMLODIPINE BESYLATE 10 MILLIGRAM(S): 10 TABLET ORAL at 08:38

## 2024-08-23 RX ADMIN — Medication 40 MILLIGRAM(S): at 05:37

## 2024-08-23 RX ADMIN — CHLORHEXIDINE GLUCONATE 1 APPLICATION(S): 40 SOLUTION TOPICAL at 12:15

## 2024-08-23 RX ADMIN — PROPOFOL 2.1 MICROGRAM(S)/KG/MIN: 10 INJECTION, EMULSION INTRAVENOUS at 03:06

## 2024-08-23 RX ADMIN — LEVETIRACETAM 1000 MILLIGRAM(S): 1000 TABLET ORAL at 05:36

## 2024-08-23 RX ADMIN — Medication 2.5 MILLIGRAM(S): at 04:21

## 2024-08-23 NOTE — DISCHARGE NOTE NURSING/CASE MANAGEMENT/SOCIAL WORK - PATIENT PORTAL LINK FT
You can access the FollowMyHealth Patient Portal offered by [facility] by registering at the following website: [email or web address]. By joining [facility]’s FollowMyHealth portal, you will also be able to view your health information using other applications (apps) compatible with our system.

## 2024-08-23 NOTE — PROGRESS NOTE ADULT - ASSESSMENT
A: 65 y/o male POD1 for LUE AVG. Patient was reintubated in PACU due to hypoxic respiratory distress post-op. Patient then admitted to SICU. Patient extubated this morning and doing well.    P: Pain control PRN  -Encourage ambulation  -Dialysis treatment   -Prepare for discharge if BP under control

## 2024-08-23 NOTE — DISCHARGE NOTE NURSING/CASE MANAGEMENT/SOCIAL WORK - NSDCVIVACCINE_GEN_ALL_CORE_FT
influenza, injectable, quadrivalent, preservative free; 12-Dec-2020 09:28; Lynn Gerardo); to-BBB; 724k2 (Exp. Date: 30-Jun-2021); IntraMuscular; Deltoid Right.; 0.5 milliLiter(s); VIS (VIS Published: 15-Aug-2019, VIS Presented: 12-Dec-2020);

## 2024-08-23 NOTE — DISCHARGE NOTE NURSING/CASE MANAGEMENT/SOCIAL WORK - NSDCPEFALRISK_GEN_ALL_CORE
For information on Fall & Injury Prevention, visit: [email or web address] OR  [email or web address] OR  https://www.cdc.gov/steadi/patient.html

## 2024-08-23 NOTE — DISCHARGE NOTE PROVIDER - NSDCFUADDINST_GEN_ALL_CORE_FT
WOUND CARE:  Please keep incisions clean and dry. Please do not Scrub or rub incisions. Do not use lotion or powder on incisions.   BATHING: You may shower and/or sponge bathe. You may use warm soapy water in the shower and rinse, pat dry.  ACTIVITY: No heavy lifting or straining. Otherwise, you may return to your usual level of physical activity. If you are taking narcotic pain medication DO NOT drive a car, operate machinery or make important decisions.  DIET: Return to your usual diet.  PAIN: You may wish to continue taking either Tylenol or Ibuprofen every 6 hours alternating for the next 24 hours and then start taking either as needed for any additional pain.  NOTIFY YOUR SURGEON IF YOU HAVE: any bleeding that does not stop, any pus draining from your wound(s), any fever (over 100.4 F) persistent nausea/vomiting, or if your pain is not controlled on your discharge pain medications, unable to urinate.  Please follow up with your primary care physician in one week regarding your hospitalization, bring copies of your discharge paperwork.  Please follow up with your surgeon, Dr. Crawford

## 2024-08-23 NOTE — DISCHARGE NOTE PROVIDER - NSDCMRMEDTOKEN_GEN_ALL_CORE_FT
amLODIPine 10 mg oral tablet: 1 tab(s) orally once a day  atorvastatin 40 mg oral tablet: 1 tab(s) orally once a day (at bedtime)  carvedilol 6.25 mg oral tablet: 1 tab(s) orally every 12 hours  cloNIDine 0.2 mg oral tablet: 1 tab(s) orally 2 times a day  Eliquis 2.5 mg oral tablet: 1 tab(s) orally 2 times a day Take one tablet twice daily  hydrALAZINE 50 mg oral tablet: 1 tab(s) orally every 8 hours  isosorbide mononitrate 30 mg oral tablet, extended release: 1 tab(s) orally once a day  levETIRAcetam 1000 mg oral tablet: 1 tab(s) orally 2 times a day  Nephro-Russel oral tablet: 1 tab(s) orally once a day  Percocet 5 mg-325 mg oral tablet: 1 tab(s) orally every 6 hours as needed for  severe pain MDD: 4 tabs/day  tamsulosin 0.4 mg oral capsule: 1 cap(s) orally once a day (at bedtime)  torsemide 10 mg oral tablet: 1 tab(s) orally every other day

## 2024-08-23 NOTE — PROGRESS NOTE ADULT - SUBJECTIVE AND OBJECTIVE BOX
Subjective: Patient examined at bedside. Patient was extubated this morning and doing well. He denies any change in numbness or pain in his left arm from his baseline. He states it's hard for him tell due to his deficits in his left arm due to a previous stroke. Patient states that he is ready to go home.      STATUS POST: MICHAEL STEVENS    POST OPERATIVE DAY #: 1    MEDICATIONS  (STANDING):  albuterol    0.083% 2.5 milliGRAM(s) Nebulizer every 6 hours  amLODIPine   Tablet 10 milliGRAM(s) Oral every 24 hours  apixaban 2.5 milliGRAM(s) Oral every 12 hours  atorvastatin 40 milliGRAM(s) Oral at bedtime  chlorhexidine 2% Cloths 1 Application(s) Topical daily  cloNIDine 0.2 milliGRAM(s) Oral every 12 hours  dexAMETHasone  Injectable 4 milliGRAM(s) IV Push every 6 hours  epoetin yolanda-epbx (RETACRIT) Injectable 34487 Unit(s) IV Push once  hydrALAZINE 50 milliGRAM(s) Oral every 8 hours  levETIRAcetam   Injectable 1000 milliGRAM(s) IV Push every 12 hours  pantoprazole  Injectable 40 milliGRAM(s) IV Push every 12 hours    MEDICATIONS  (PRN):  acetaminophen   IVPB .. 1000 milliGRAM(s) IV Intermittent every 6 hours PRN Mild Pain (1 - 3), Moderate Pain (4 - 6), Severe Pain (7 - 10)  HYDROmorphone  Injectable 0.2 milliGRAM(s) IV Push every 4 hours PRN Breakthrough Pain      Vital Signs Last 24 Hrs  T(C): 36.6 (23 Aug 2024 06:15), Max: 36.9 (22 Aug 2024 23:33)  T(F): 97.9 (23 Aug 2024 06:15), Max: 98.5 (22 Aug 2024 23:33)  HR: 71 (23 Aug 2024 07:00) (55 - 108)  BP: 177/84 (23 Aug 2024 07:00) (101/59 - 234/108)  BP(mean): 108 (23 Aug 2024 07:00) (71 - 130)  RR: 16 (23 Aug 2024 07:00) (8 - 30)  SpO2: 99% (23 Aug 2024 07:00) (50% - 100%)    Parameters below as of 23 Aug 2024 04:21  Patient On (Oxygen Delivery Method): ventilator        Physical Exam:    Constitutional: laying in bed comfortably, NAD, alert and conversant   Respiratory: non-labored, no accessory muscle use  Cardiovascular: RRR  Extremities: expected mild bruising and swelling in LUE around graft site, incision clean, dry and intact, palpable for thrill over graft    LABS:                        9.6    5.65  )-----------( 151      ( 23 Aug 2024 01:00 )             28.9     08-23    138  |  101  |  54.4<H>  ----------------------------<  125<H>  4.1   |  22.0  |  6.85<H>    Ca    8.9      23 Aug 2024 01:00  Phos  6.8     08-23  Mg     2.6     08-23    TPro  6.2<L>  /  Alb  3.7  /  TBili  0.5  /  DBili  x   /  AST  10  /  ALT  10  /  AlkPhos  84  08-22    PT/INR - ( 22 Aug 2024 12:30 )   PT: 14.4 sec;   INR: 1.31 ratio         PTT - ( 22 Aug 2024 12:30 )  PTT:32.0 sec  Urinalysis Basic - ( 23 Aug 2024 01:00 )    Color: x / Appearance: x / SG: x / pH: x  Gluc: 125 mg/dL / Ketone: x  / Bili: x / Urobili: x   Blood: x / Protein: x / Nitrite: x   Leuk Esterase: x / RBC: x / WBC x   Sq Epi: x / Non Sq Epi: x / Bacteria: x       Subjective: Patient examined at bedside. Patient was extubated this morning and doing well. He denies any change in numbness or pain in his left arm from his baseline. He states it's hard for him tell due to his deficits in his left arm due to a previous stroke. Patient states that he is ready to go home.      STATUS POST: MICHAEL STEVENS    POST OPERATIVE DAY #: 1    MEDICATIONS  (STANDING):  albuterol    0.083% 2.5 milliGRAM(s) Nebulizer every 6 hours  amLODIPine   Tablet 10 milliGRAM(s) Oral every 24 hours  apixaban 2.5 milliGRAM(s) Oral every 12 hours  atorvastatin 40 milliGRAM(s) Oral at bedtime  chlorhexidine 2% Cloths 1 Application(s) Topical daily  cloNIDine 0.2 milliGRAM(s) Oral every 12 hours  dexAMETHasone  Injectable 4 milliGRAM(s) IV Push every 6 hours  epoetin yolanda-epbx (RETACRIT) Injectable 20999 Unit(s) IV Push once  hydrALAZINE 50 milliGRAM(s) Oral every 8 hours  levETIRAcetam   Injectable 1000 milliGRAM(s) IV Push every 12 hours  pantoprazole  Injectable 40 milliGRAM(s) IV Push every 12 hours    MEDICATIONS  (PRN):  acetaminophen   IVPB .. 1000 milliGRAM(s) IV Intermittent every 6 hours PRN Mild Pain (1 - 3), Moderate Pain (4 - 6), Severe Pain (7 - 10)  HYDROmorphone  Injectable 0.2 milliGRAM(s) IV Push every 4 hours PRN Breakthrough Pain      Vital Signs Last 24 Hrs  T(C): 36.6 (23 Aug 2024 06:15), Max: 36.9 (22 Aug 2024 23:33)  T(F): 97.9 (23 Aug 2024 06:15), Max: 98.5 (22 Aug 2024 23:33)  HR: 71 (23 Aug 2024 07:00) (55 - 108)  BP: 177/84 (23 Aug 2024 07:00) (101/59 - 234/108)  BP(mean): 108 (23 Aug 2024 07:00) (71 - 130)  RR: 16 (23 Aug 2024 07:00) (8 - 30)  SpO2: 99% (23 Aug 2024 07:00) (50% - 100%)    Parameters below as of 23 Aug 2024 04:21  Patient On (Oxygen Delivery Method): ventilator        Physical Exam:    Constitutional: laying in bed comfortably, NAD, alert and conversant   Respiratory: non-labored, no accessory muscle use  Cardiovascular: RRR  Extremities: expected mild bruising and swelling in LUE around graft site, incision clean, dry and intact, palpable for thrill over graft      LABS:                        9.6    5.65  )-----------( 151      ( 23 Aug 2024 01:00 )             28.9     08-23    138  |  101  |  54.4<H>  ----------------------------<  125<H>  4.1   |  22.0  |  6.85<H>    Ca    8.9      23 Aug 2024 01:00  Phos  6.8     08-23  Mg     2.6     08-23    TPro  6.2<L>  /  Alb  3.7  /  TBili  0.5  /  DBili  x   /  AST  10  /  ALT  10  /  AlkPhos  84  08-22    PT/INR - ( 22 Aug 2024 12:30 )   PT: 14.4 sec;   INR: 1.31 ratio         PTT - ( 22 Aug 2024 12:30 )  PTT:32.0 sec  Urinalysis Basic - ( 23 Aug 2024 01:00 )    Color: x / Appearance: x / SG: x / pH: x  Gluc: 125 mg/dL / Ketone: x  / Bili: x / Urobili: x   Blood: x / Protein: x / Nitrite: x   Leuk Esterase: x / RBC: x / WBC x   Sq Epi: x / Non Sq Epi: x / Bacteria: x

## 2024-08-23 NOTE — PROGRESS NOTE ADULT - SUBJECTIVE AND OBJECTIVE BOX
INTERVAL HPI/OVERNIGHT EVENTS:  Transferred to critical care setting after requiring post-op reintubation yesterday.  Extubated earlier this am.    SUBJECTIVE:  Eager to go home.    ICU Vital Signs Last 24 Hrs  T(C): 37.1 (23 Aug 2024 11:16), Max: 37.1 (23 Aug 2024 11:16)  T(F): 98.7 (23 Aug 2024 11:16), Max: 98.7 (23 Aug 2024 11:16)  HR: 70 (23 Aug 2024 08:00) (55 - 78)  BP: 152/71 (23 Aug 2024 08:00) (101/59 - 196/83)  BP(mean): 109 (23 Aug 2024 07:45) (71 - 112)  ABP: --  ABP(mean): --  RR: 15 (23 Aug 2024 08:00) (8 - 30)  SpO2: 99% (23 Aug 2024 08:00) (99% - 100%)    O2 Parameters below as of 23 Aug 2024 04:21  Patient On (Oxygen Delivery Method): ventilator      I&O's Detail    22 Aug 2024 07:01  -  23 Aug 2024 07:00  --------------------------------------------------------  IN:    IV PiggyBack: 50 mL    IV PiggyBack: 100 mL    Propofol: 243.6 mL  Total IN: 393.6 mL    OUT:    Intermittent Catheterization - Urethral (mL): 400 mL  Total OUT: 400 mL    Total NET: -6.4 mL        Mode: CPAP with PS  FiO2: 30  PEEP: 5  PS: 5  MAP: 8    MEDICATIONS  (STANDING):  albuterol    0.083% 2.5 milliGRAM(s) Nebulizer every 6 hours  amLODIPine   Tablet 10 milliGRAM(s) Oral every 24 hours  apixaban 2.5 milliGRAM(s) Oral every 12 hours  atorvastatin 40 milliGRAM(s) Oral at bedtime  carvedilol 6.25 milliGRAM(s) Oral every 12 hours  chlorhexidine 2% Cloths 1 Application(s) Topical daily  cloNIDine 0.2 milliGRAM(s) Oral every 12 hours  dexAMETHasone  Injectable 4 milliGRAM(s) IV Push every 6 hours  epoetin yolanda-epbx (RETACRIT) Injectable 53248 Unit(s) IV Push once  hydrALAZINE 50 milliGRAM(s) Oral every 8 hours  levETIRAcetam   Injectable 1000 milliGRAM(s) IV Push every 12 hours  pantoprazole  Injectable 40 milliGRAM(s) IV Push every 12 hours    MEDICATIONS  (PRN):  acetaminophen   IVPB .. 1000 milliGRAM(s) IV Intermittent every 6 hours PRN Mild Pain (1 - 3), Moderate Pain (4 - 6), Severe Pain (7 - 10)  HYDROmorphone  Injectable 0.2 milliGRAM(s) IV Push every 4 hours PRN Breakthrough Pain    Drug Dosing Weight  Height (cm): 172.7 (22 Aug 2024 06:04)  Weight (kg): 70 (22 Aug 2024 06:04)  BMI (kg/m2): 23.5 (22 Aug 2024 06:04)  BSA (m2): 1.83 (22 Aug 2024 06:04)    LABS:  ABG - ( 23 Aug 2024 05:04 )  pH, Arterial: 7.370 pH, Blood: x     /  pCO2: 39    /  pO2: 133   / HCO3: 22    / Base Excess: -2.8  /  SaO2: 100.0             CBC Full  -  ( 23 Aug 2024 01:00 )  WBC Count : 5.65 K/uL  RBC Count : 3.06 M/uL  Hemoglobin : 9.6 g/dL  Hematocrit : 28.9 %  Platelet Count - Automated : 151 K/uL  Mean Cell Volume : 94.4 fl  Mean Cell Hemoglobin : 31.4 pg  Mean Cell Hemoglobin Concentration : 33.2 gm/dL  Auto Neutrophil # : x  Auto Lymphocyte # : x  Auto Monocyte # : x  Auto Eosinophil # : x  Auto Basophil # : x  Auto Neutrophil % : x  Auto Lymphocyte % : x  Auto Monocyte % : x  Auto Eosinophil % : x  Auto Basophil % : x    08-23    138  |  101  |  54.4<H>  ----------------------------<  125<H>  4.1   |  22.0  |  6.85<H>    Ca    8.9      23 Aug 2024 01:00  Phos  6.8     08-23  Mg     2.6     08-23    TPro  6.2<L>  /  Alb  3.7  /  TBili  0.5  /  DBili  x   /  AST  10  /  ALT  10  /  AlkPhos  84  08-22    PT/INR - ( 22 Aug 2024 12:30 )   PT: 14.4 sec;   INR: 1.31 ratio         PTT - ( 22 Aug 2024 12:30 )  PTT:32.0 sec  Urinalysis Basic - ( 23 Aug 2024 01:00 )    Color: x / Appearance: x / SG: x / pH: x  Gluc: 125 mg/dL / Ketone: x  / Bili: x / Urobili: x   Blood: x / Protein: x / Nitrite: x   Leuk Esterase: x / RBC: x / WBC x   Sq Epi: x / Non Sq Epi: x / Bacteria: x        Physical Exam:  GENERAL: Sitting up comfortably in bed  NEUROLOGIC: Alert and oriented, m  HEENT:  NECK:  HEART:  CHEST/LUNG:  ABDOMEN:  EXTREMITIES:  SKIN:    PATIENT CARE ACCESS DEVICES:  [ ] Peripheral IV  [ ] Central Venous Line	[ ] R	[ ] L	[ ] IJ	[ ] Fem	[ ] SC	   [ ] Arterial Line		[ ] R	[ ] L	[ ] Fem	[ ] Rad	[ ] Ax	   [ ] PICC:					[ ] Mediport  [ ] Urinary Catheter:   [ ] Necessity of urinary, arterial, and venous catheters discussed           INTERVAL HPI/OVERNIGHT EVENTS:  Transferred to critical care setting after requiring post-op reintubation yesterday.  Extubated earlier this am.    SUBJECTIVE:  Eager to go home.    ICU Vital Signs Last 24 Hrs  T(C): 37.1 (23 Aug 2024 11:16), Max: 37.1 (23 Aug 2024 11:16)  T(F): 98.7 (23 Aug 2024 11:16), Max: 98.7 (23 Aug 2024 11:16)  HR: 70 (23 Aug 2024 08:00) (55 - 78)  BP: 152/71 (23 Aug 2024 08:00) (101/59 - 196/83)  BP(mean): 109 (23 Aug 2024 07:45) (71 - 112)  ABP: --  ABP(mean): --  RR: 15 (23 Aug 2024 08:00) (8 - 30)  SpO2: 99% (23 Aug 2024 08:00) (99% - 100%)    O2 Parameters below as of 23 Aug 2024 04:21  Patient On (Oxygen Delivery Method): ventilator      I&O's Detail    22 Aug 2024 07:01  -  23 Aug 2024 07:00  --------------------------------------------------------  IN:    IV PiggyBack: 50 mL    IV PiggyBack: 100 mL    Propofol: 243.6 mL  Total IN: 393.6 mL    OUT:    Intermittent Catheterization - Urethral (mL): 400 mL  Total OUT: 400 mL    Total NET: -6.4 mL        Mode: CPAP with PS  FiO2: 30  PEEP: 5  PS: 5  MAP: 8    MEDICATIONS  (STANDING):  albuterol    0.083% 2.5 milliGRAM(s) Nebulizer every 6 hours  amLODIPine   Tablet 10 milliGRAM(s) Oral every 24 hours  apixaban 2.5 milliGRAM(s) Oral every 12 hours  atorvastatin 40 milliGRAM(s) Oral at bedtime  carvedilol 6.25 milliGRAM(s) Oral every 12 hours  chlorhexidine 2% Cloths 1 Application(s) Topical daily  cloNIDine 0.2 milliGRAM(s) Oral every 12 hours  dexAMETHasone  Injectable 4 milliGRAM(s) IV Push every 6 hours  epoetin yolanda-epbx (RETACRIT) Injectable 86100 Unit(s) IV Push once  hydrALAZINE 50 milliGRAM(s) Oral every 8 hours  levETIRAcetam   Injectable 1000 milliGRAM(s) IV Push every 12 hours  pantoprazole  Injectable 40 milliGRAM(s) IV Push every 12 hours    MEDICATIONS  (PRN):  acetaminophen   IVPB .. 1000 milliGRAM(s) IV Intermittent every 6 hours PRN Mild Pain (1 - 3), Moderate Pain (4 - 6), Severe Pain (7 - 10)  HYDROmorphone  Injectable 0.2 milliGRAM(s) IV Push every 4 hours PRN Breakthrough Pain    Drug Dosing Weight  Height (cm): 172.7 (22 Aug 2024 06:04)  Weight (kg): 70 (22 Aug 2024 06:04)  BMI (kg/m2): 23.5 (22 Aug 2024 06:04)  BSA (m2): 1.83 (22 Aug 2024 06:04)    LABS:  ABG - ( 23 Aug 2024 05:04 )  pH, Arterial: 7.370 pH, Blood: x     /  pCO2: 39    /  pO2: 133   / HCO3: 22    / Base Excess: -2.8  /  SaO2: 100.0             CBC Full  -  ( 23 Aug 2024 01:00 )  WBC Count : 5.65 K/uL  RBC Count : 3.06 M/uL  Hemoglobin : 9.6 g/dL  Hematocrit : 28.9 %  Platelet Count - Automated : 151 K/uL  Mean Cell Volume : 94.4 fl  Mean Cell Hemoglobin : 31.4 pg  Mean Cell Hemoglobin Concentration : 33.2 gm/dL  Auto Neutrophil # : x  Auto Lymphocyte # : x  Auto Monocyte # : x  Auto Eosinophil # : x  Auto Basophil # : x  Auto Neutrophil % : x  Auto Lymphocyte % : x  Auto Monocyte % : x  Auto Eosinophil % : x  Auto Basophil % : x    08-23    138  |  101  |  54.4<H>  ----------------------------<  125<H>  4.1   |  22.0  |  6.85<H>    Ca    8.9      23 Aug 2024 01:00  Phos  6.8     08-23  Mg     2.6     08-23    TPro  6.2<L>  /  Alb  3.7  /  TBili  0.5  /  DBili  x   /  AST  10  /  ALT  10  /  AlkPhos  84  08-22    PT/INR - ( 22 Aug 2024 12:30 )   PT: 14.4 sec;   INR: 1.31 ratio         PTT - ( 22 Aug 2024 12:30 )  PTT:32.0 sec  Urinalysis Basic - ( 23 Aug 2024 01:00 )    Color: x / Appearance: x / SG: x / pH: x  Gluc: 125 mg/dL / Ketone: x  / Bili: x / Urobili: x   Blood: x / Protein: x / Nitrite: x   Leuk Esterase: x / RBC: x / WBC x   Sq Epi: x / Non Sq Epi: x / Bacteria: x      Physical Exam:  GENERAL: Sitting up comfortably in bed  NEUROLOGIC: Alert and oriented, moving all extremities  HEENT: NC/AT  HEART: Regular rate on rhythm  CHEST/LUNG: Breathing comfortably on RA  ABDOMEN:  EXTREMITIES:  SKIN:    PATIENT CARE ACCESS DEVICES:  [ ] Peripheral IV  [ ] Central Venous Line	[ ] R	[ ] L	[ ] IJ	[ ] Fem	[ ] SC	   [ ] Arterial Line		[ ] R	[ ] L	[ ] Fem	[ ] Rad	[ ] Ax	   [ ] PICC:					[ ] Mediport  [ ] Urinary Catheter:   [ ] Necessity of urinary, arterial, and venous catheters discussed           INTERVAL HPI/OVERNIGHT EVENTS:  Transferred to critical care setting after requiring post-op reintubation yesterday.  Extubated earlier this am.    SUBJECTIVE:  Eager to go home.    ICU Vital Signs Last 24 Hrs  T(C): 37.1 (23 Aug 2024 11:16), Max: 37.1 (23 Aug 2024 11:16)  T(F): 98.7 (23 Aug 2024 11:16), Max: 98.7 (23 Aug 2024 11:16)  HR: 70 (23 Aug 2024 08:00) (55 - 78)  BP: 152/71 (23 Aug 2024 08:00) (101/59 - 196/83)  BP(mean): 109 (23 Aug 2024 07:45) (71 - 112)  ABP: --  ABP(mean): --  RR: 15 (23 Aug 2024 08:00) (8 - 30)  SpO2: 99% (23 Aug 2024 08:00) (99% - 100%)    O2 Parameters below as of 23 Aug 2024 04:21  Patient On (Oxygen Delivery Method): ventilator      I&O's Detail    22 Aug 2024 07:01  -  23 Aug 2024 07:00  --------------------------------------------------------  IN:    IV PiggyBack: 50 mL    IV PiggyBack: 100 mL    Propofol: 243.6 mL  Total IN: 393.6 mL    OUT:    Intermittent Catheterization - Urethral (mL): 400 mL  Total OUT: 400 mL    Total NET: -6.4 mL        Mode: CPAP with PS  FiO2: 30  PEEP: 5  PS: 5  MAP: 8    MEDICATIONS  (STANDING):  albuterol    0.083% 2.5 milliGRAM(s) Nebulizer every 6 hours  amLODIPine   Tablet 10 milliGRAM(s) Oral every 24 hours  apixaban 2.5 milliGRAM(s) Oral every 12 hours  atorvastatin 40 milliGRAM(s) Oral at bedtime  carvedilol 6.25 milliGRAM(s) Oral every 12 hours  chlorhexidine 2% Cloths 1 Application(s) Topical daily  cloNIDine 0.2 milliGRAM(s) Oral every 12 hours  dexAMETHasone  Injectable 4 milliGRAM(s) IV Push every 6 hours  epoetin yolanda-epbx (RETACRIT) Injectable 78162 Unit(s) IV Push once  hydrALAZINE 50 milliGRAM(s) Oral every 8 hours  levETIRAcetam   Injectable 1000 milliGRAM(s) IV Push every 12 hours  pantoprazole  Injectable 40 milliGRAM(s) IV Push every 12 hours    MEDICATIONS  (PRN):  acetaminophen   IVPB .. 1000 milliGRAM(s) IV Intermittent every 6 hours PRN Mild Pain (1 - 3), Moderate Pain (4 - 6), Severe Pain (7 - 10)  HYDROmorphone  Injectable 0.2 milliGRAM(s) IV Push every 4 hours PRN Breakthrough Pain    Drug Dosing Weight  Height (cm): 172.7 (22 Aug 2024 06:04)  Weight (kg): 70 (22 Aug 2024 06:04)  BMI (kg/m2): 23.5 (22 Aug 2024 06:04)  BSA (m2): 1.83 (22 Aug 2024 06:04)    LABS:  ABG - ( 23 Aug 2024 05:04 )  pH, Arterial: 7.370 pH, Blood: x     /  pCO2: 39    /  pO2: 133   / HCO3: 22    / Base Excess: -2.8  /  SaO2: 100.0             CBC Full  -  ( 23 Aug 2024 01:00 )  WBC Count : 5.65 K/uL  RBC Count : 3.06 M/uL  Hemoglobin : 9.6 g/dL  Hematocrit : 28.9 %  Platelet Count - Automated : 151 K/uL  Mean Cell Volume : 94.4 fl  Mean Cell Hemoglobin : 31.4 pg  Mean Cell Hemoglobin Concentration : 33.2 gm/dL  Auto Neutrophil # : x  Auto Lymphocyte # : x  Auto Monocyte # : x  Auto Eosinophil # : x  Auto Basophil # : x  Auto Neutrophil % : x  Auto Lymphocyte % : x  Auto Monocyte % : x  Auto Eosinophil % : x  Auto Basophil % : x    08-23    138  |  101  |  54.4<H>  ----------------------------<  125<H>  4.1   |  22.0  |  6.85<H>    Ca    8.9      23 Aug 2024 01:00  Phos  6.8     08-23  Mg     2.6     08-23    TPro  6.2<L>  /  Alb  3.7  /  TBili  0.5  /  DBili  x   /  AST  10  /  ALT  10  /  AlkPhos  84  08-22    PT/INR - ( 22 Aug 2024 12:30 )   PT: 14.4 sec;   INR: 1.31 ratio         PTT - ( 22 Aug 2024 12:30 )  PTT:32.0 sec  Urinalysis Basic - ( 23 Aug 2024 01:00 )    Color: x / Appearance: x / SG: x / pH: x  Gluc: 125 mg/dL / Ketone: x  / Bili: x / Urobili: x   Blood: x / Protein: x / Nitrite: x   Leuk Esterase: x / RBC: x / WBC x   Sq Epi: x / Non Sq Epi: x / Bacteria: x      Physical Exam:  GENERAL: Sitting up comfortably in bed  NEUROLOGIC: Alert and oriented, moving all extremities  HEENT: NC/AT  HEART: Regular rate on rhythm  CHEST/LUNG: Breathing comfortably on RA, Right chest wall permacath dressing clean and dry  ABDOMEN: Soft, non tender, non distended  EXTREMITIES: LUE incision/dressing clean and dry, mild bruising noted, palpable thrill over graft, audible bruit on auscultation, L hand warm, well perfused w/sensation intact  SKIN: No rashes    PATIENT CARE ACCESS DEVICES:  [x ] Peripheral IV  [x ] Central Venous Line	[x ] R	[ ] L	[ ] IJ	[ ] Fem	[ x] SC -permacath	   [ ] Arterial Line		[ ] R	[ ] L	[ ] Fem	[ ] Rad	[ ] Ax	   [ ] PICC:					[ ] Mediport  [ ] Urinary Catheter:   [ ] Necessity of urinary, arterial, and venous catheters discussed           INTERVAL HPI/OVERNIGHT EVENTS:  Transferred to critical care setting after requiring post-op reintubation yesterday.  Extubated earlier this am.    SUBJECTIVE:  Eager to go home today.    ICU Vital Signs Last 24 Hrs  T(C): 37.1 (23 Aug 2024 11:16), Max: 37.1 (23 Aug 2024 11:16)  T(F): 98.7 (23 Aug 2024 11:16), Max: 98.7 (23 Aug 2024 11:16)  HR: 70 (23 Aug 2024 08:00) (55 - 78)  BP: 152/71 (23 Aug 2024 08:00) (101/59 - 196/83)  BP(mean): 109 (23 Aug 2024 07:45) (71 - 112)  ABP: --  ABP(mean): --  RR: 15 (23 Aug 2024 08:00) (8 - 30)  SpO2: 99% (23 Aug 2024 08:00) (99% - 100%)    O2 Parameters below as of 23 Aug 2024 04:21  Patient On (Oxygen Delivery Method): ventilator      I&O's Detail    22 Aug 2024 07:01  -  23 Aug 2024 07:00  --------------------------------------------------------  IN:    IV PiggyBack: 50 mL    IV PiggyBack: 100 mL    Propofol: 243.6 mL  Total IN: 393.6 mL    OUT:    Intermittent Catheterization - Urethral (mL): 400 mL  Total OUT: 400 mL    Total NET: -6.4 mL        Mode: CPAP with PS  FiO2: 30  PEEP: 5  PS: 5  MAP: 8    MEDICATIONS  (STANDING):  albuterol    0.083% 2.5 milliGRAM(s) Nebulizer every 6 hours  amLODIPine   Tablet 10 milliGRAM(s) Oral every 24 hours  apixaban 2.5 milliGRAM(s) Oral every 12 hours  atorvastatin 40 milliGRAM(s) Oral at bedtime  carvedilol 6.25 milliGRAM(s) Oral every 12 hours  chlorhexidine 2% Cloths 1 Application(s) Topical daily  cloNIDine 0.2 milliGRAM(s) Oral every 12 hours  dexAMETHasone  Injectable 4 milliGRAM(s) IV Push every 6 hours  epoetin yolanda-epbx (RETACRIT) Injectable 48624 Unit(s) IV Push once  hydrALAZINE 50 milliGRAM(s) Oral every 8 hours  levETIRAcetam   Injectable 1000 milliGRAM(s) IV Push every 12 hours  pantoprazole  Injectable 40 milliGRAM(s) IV Push every 12 hours    MEDICATIONS  (PRN):  acetaminophen   IVPB .. 1000 milliGRAM(s) IV Intermittent every 6 hours PRN Mild Pain (1 - 3), Moderate Pain (4 - 6), Severe Pain (7 - 10)  HYDROmorphone  Injectable 0.2 milliGRAM(s) IV Push every 4 hours PRN Breakthrough Pain    Drug Dosing Weight  Height (cm): 172.7 (22 Aug 2024 06:04)  Weight (kg): 70 (22 Aug 2024 06:04)  BMI (kg/m2): 23.5 (22 Aug 2024 06:04)  BSA (m2): 1.83 (22 Aug 2024 06:04)    LABS:  ABG - ( 23 Aug 2024 05:04 )  pH, Arterial: 7.370 pH, Blood: x     /  pCO2: 39    /  pO2: 133   / HCO3: 22    / Base Excess: -2.8  /  SaO2: 100.0             CBC Full  -  ( 23 Aug 2024 01:00 )  WBC Count : 5.65 K/uL  RBC Count : 3.06 M/uL  Hemoglobin : 9.6 g/dL  Hematocrit : 28.9 %  Platelet Count - Automated : 151 K/uL  Mean Cell Volume : 94.4 fl  Mean Cell Hemoglobin : 31.4 pg  Mean Cell Hemoglobin Concentration : 33.2 gm/dL  Auto Neutrophil # : x  Auto Lymphocyte # : x  Auto Monocyte # : x  Auto Eosinophil # : x  Auto Basophil # : x  Auto Neutrophil % : x  Auto Lymphocyte % : x  Auto Monocyte % : x  Auto Eosinophil % : x  Auto Basophil % : x    08-23    138  |  101  |  54.4<H>  ----------------------------<  125<H>  4.1   |  22.0  |  6.85<H>    Ca    8.9      23 Aug 2024 01:00  Phos  6.8     08-23  Mg     2.6     08-23    TPro  6.2<L>  /  Alb  3.7  /  TBili  0.5  /  DBili  x   /  AST  10  /  ALT  10  /  AlkPhos  84  08-22    PT/INR - ( 22 Aug 2024 12:30 )   PT: 14.4 sec;   INR: 1.31 ratio         PTT - ( 22 Aug 2024 12:30 )  PTT:32.0 sec  Urinalysis Basic - ( 23 Aug 2024 01:00 )    Color: x / Appearance: x / SG: x / pH: x  Gluc: 125 mg/dL / Ketone: x  / Bili: x / Urobili: x   Blood: x / Protein: x / Nitrite: x   Leuk Esterase: x / RBC: x / WBC x   Sq Epi: x / Non Sq Epi: x / Bacteria: x      Physical Exam:  GENERAL: Sitting up comfortably in bed  NEUROLOGIC: Alert and oriented, moving all extremities  HEENT: NC/AT  HEART: Regular rate on rhythm  CHEST/LUNG: Breathing comfortably on RA, Right chest wall permacath dressing clean and dry  ABDOMEN: Soft, non tender, non distended  EXTREMITIES: LUE incision/dressing clean and dry, mild bruising noted, palpable thrill over graft, audible bruit on auscultation, L hand warm, well perfused w/sensation intact  SKIN: No rashes    PATIENT CARE ACCESS DEVICES:  [x ] Peripheral IV  [x ] Central Venous Line	[x ] R	[ ] L	[ ] IJ	[ ] Fem	[ x] SC -permacath	   [ ] Arterial Line		[ ] R	[ ] L	[ ] Fem	[ ] Rad	[ ] Ax	   [ ] PICC:					[ ] Mediport  [ ] Urinary Catheter:   [ ] Necessity of urinary, arterial, and venous catheters discussed

## 2024-08-23 NOTE — DISCHARGE NOTE PROVIDER - CARE PROVIDERS DIRECT ADDRESSES
[email or web address]

## 2024-08-23 NOTE — CHART NOTE - NSCHARTNOTEFT_GEN_A_CORE
Patient on HD (tu/th/sa) who last had dialysis on Wed 8/21 in preparation for surgery (Thurs 8/22). Initially planned for HD today (Fri 8/23) as outpatient, however as he was admitted, we planned for HD inpatient prior to dc. He is adamantly refusing HD today prior to DC. I explained that he is at risk for developing fluid overload, arrhythmia either of which may require re-hospitalization. Patient insisting that he will go to his usual outpatient HD center, next appointment is tomorrow Sat 8/24/24 at 5am. Patient aware and accepting of risk of leaving without dialysis today.
Patient with PMH of COPD, prior CVA, CAD with PCI, CHF, PCN allergy presented for elective AV graft creation. Surgery initially attempted with sedation, however patient could not tolerate and required intubation. Case was uneventful and patient awoke from anesthesia actively moving his legs and arms. He had normal vitals and was extubated without issue. Upon transport from OR to PACU, patient started expressing difficulty breathing. Rushed patient to PACU and pulse ox measured 45%. Team attempted to bag patient, however he was agitated and fighting the bagging. Ultimately saturations improved slightly to 70% at most. Decision made to intubate patient and admit to SICU. Post intubation, patient's saturations 100%.
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    Patient on Eliquis 2.5mg BID at home.    Patient can restart Eliquis once cleared by SICU team and Vascular Surgery. No further recommendations at this time.    Rest of care per primary team.    Case discussed with Dr. Butler.

## 2024-08-23 NOTE — DISCHARGE NOTE PROVIDER - CARE PROVIDER_API CALL
Prasanna Crawford  Vascular Surgery  [address]  [place] [address]  Phone: [phone]  Fax: [phone]  Follow Up Time: 2 weeks

## 2024-08-23 NOTE — DISCHARGE NOTE PROVIDER - NSDCFUSCHEDAPPT_GEN_ALL_CORE_FT
Kiersten Castellon  [facility]  ELECTROPH [address]  Scheduled Appointment: 09/09/2024    Av Alvarado  [facility]  GASTRO [address]  Scheduled Appointment: 11/06/2024

## 2024-08-23 NOTE — DISCHARGE NOTE PROVIDER - HOSPITAL COURSE
64M with PMH of COPD, MI, CAD, ESRD presented from home for elective AV graft creation in [facility] after his prior graft had to be removed 2/2 infection. Surgery went well but after extubation and transport to PACU, patient became hypoxic and agitated necessitating intubation and SICU admission. Extubated on the morning of POD 1, no further respiratory complaints. At this time, patient is currently ambulating, voiding, tolerating a regular diet. Pain well controlled on PO pain meds. Patient has been deemed stable for discharge home with follow up as an outpatient.

## 2024-08-23 NOTE — PROGRESS NOTE ADULT - ASSESSMENT
ESRD / HD TTS at [facility]  Resp failure     s/p  axillary-brachial graft creation LUE. Post operatively patient was extubated and became hypoxic, requiring reintubation.   No pressing need for acute HD today and doesn't want it   LUE AVG patent   CXR here today - clear lung fields ( follow official read )     Anemia - Hgb stable   Retacrit with HD     S/P Watchman 8/2/24 - remains on renal dose Eliquis \    Htn - BP high - claims anxiety to go home, better in few minutes

## 2024-08-23 NOTE — PROGRESS NOTE ADULT - SUBJECTIVE AND OBJECTIVE BOX
Patient seen and examined    I&O's Summary    22 Aug 2024 07:01  -  23 Aug 2024 07:00  --------------------------------------------------------  IN: 393.6 mL / OUT: 400 mL / NET: -6.4 mL    23 Aug 2024 07:01  -  23 Aug 2024 14:40  --------------------------------------------------------  IN: 45 mL / OUT: 0 mL / NET: 45 mL    Keen to go home    REVIEW OF SYSTEMS:    CONSTITUTIONAL: No F/C  RESPIRATORY: No cough,  No SOB  CARDIOVASCULAR: No CP/palpitations,    GASTROINTESTINAL: No abdominal pain  or NVD   GENITOURINARY: No UTI sx  NEUROLOGICAL: No headaches, NO wk/numbness  MUSCULOSKELETAL:   EXTREMITIES : no swelling,    Vital Signs Last 24 Hrs  T(C): 37.1 (23 Aug 2024 11:16), Max: 37.1 (23 Aug 2024 11:16)  T(F): 98.7 (23 Aug 2024 11:16), Max: 98.7 (23 Aug 2024 11:16)  HR: 84 (23 Aug 2024 13:15) (57 - 84)  BP: 184/82 (23 Aug 2024 13:15) (101/59 - 196/83)  BP(mean): 105 (23 Aug 2024 13:15) (71 - 189)  RR: 18 (23 Aug 2024 13:15) (8 - 30)  SpO2: 98% (23 Aug 2024 13:15) (96% - 100%)    Parameters below as of 23 Aug 2024 04:21  Patient On (Oxygen Delivery Method): ventilator        PHYSICAL EXAM:    GENERAL: NAD,   EYES:  conjunctiva and sclera clear  NECK: Supple, No JVD/Bruit  NERVOUS SYSTEM:  A/O x3,   CHEST:  No rales or rhonchi  HEART:  RRR, No murmur  ABDOMEN: Soft, NT/ND BS+  EXTREMITIES:  No Edema;  SKIN: No rashes    LABS:                          9.6    5.65  )-----------( 151      ( 23 Aug 2024 01:00 )             28.9     08-23    138  |  101  |  54.4<H>  ----------------------------<  125<H>  4.1   |  22.0  |  6.85<H>    Ca    8.9      23 Aug 2024 01:00  Phos  6.8     08-23  Mg     2.6     08-23    TPro  6.2<L>  /  Alb  3.7  /  TBili  0.5  /  DBili  x   /  AST  10  /  ALT  10  /  AlkPhos  84  08-22      MEDICATIONS  (STANDING):  acetaminophen   IVPB .. PRN  albuterol    0.083%  amLODIPine   Tablet  apixaban  atorvastatin  carvedilol  chlorhexidine 2% Cloths  cloNIDine  epoetin yolanda-epbx (RETACRIT) Injectable  hydrALAZINE  HYDROmorphone  Injectable PRN  levETIRAcetam   Injectable  pantoprazole  Injectable

## 2024-08-23 NOTE — PROGRESS NOTE ADULT - ASSESSMENT
Neuro: Keppra -home med in place  Cardiovascular: Bradycardia initially post op, now none, home meds in place  Pulmonary: Breathing comfortably on RA w/adequate 02 sats, decadron to be stopped  Heme: H/H stable, scds, on home apixan  GI:   : Patient wants to go to home HD today  ID:   Fluids:   Endocrine/Electrolytes:   Skin:   Lines: R chest wall permacath  Code Status:    64 year old gentleman w/hx of ESRD (T, Th, S) via right chest wall permacath, HTN, HLD, CAD, CABG, atrial fibrillation s/p watchman procedure, CVA w/L weakness, prior GIB, aortic dissection s/p repair, AVR and MVR who was admitted 8/22/24 for an elective L axillary-brachial graft creation.  Post operatively in PACU patient developed acute hypoxemia requiring reintubation.  No airway edema noted at time of re-intubation.  Extubated without incident early am 8/23.    Neuro: Keppra -home med in place  Cardiovascular: Bradycardia initially post op, now none, home meds in place, hypertension this am -due for some of his home meds -to be given  Pulmonary: Breathing comfortably on RA w/adequate 02 sats, decadron to be stopped  Heme: H/H stable, scds, on home apixiban   GI: Diet as tolerated, bowel regimen  : Patient wants to go to home HD scheduled for tomorrow at 0500, does make urine -voiding, Cr increased to 6.85  ID: Afebrile, WBC normal, off antibiotics  Fluids: None  Endocrine/Electrolytes: Lytes adequate, hyperphosphotemia noted -due for HD tomorrow  Skin:  No rashes, graft functioning w/adequate perfusion to L hand  Lines: R chest wall permacath clean and dry    Patient w/o acute critical care issues.  Will transfer from critical care setting.  For likely discharge home per vascular surgery team.   64 year old gentleman w/hx of ESRD (T, Th, S) via right chest wall permacath, HTN, HLD, CAD, CABG, atrial fibrillation s/p watchman procedure, CVA w/L weakness, prior GIB, aortic dissection s/p repair, AVR and MVR who was admitted 8/22/24 for an elective L axillary-brachial graft creation.  Post operatively in PACU patient developed acute hypoxemia requiring reintubation.  No airway edema noted at time of re-intubation.  Extubated without incident early am 8/23.    Neuro: Keppra -home med in place  Cardiovascular: Bradycardia initially post op, now none, home meds in place, hypertension this am -due for some of his home meds -to be given  Pulmonary: Breathing comfortably on RA w/adequate 02 sats, decadron to be stopped  Heme: H/H stable, scds, on home apixiban   GI: Diet as tolerated, bowel regimen  : Patient wants to go to home HD today.  Does not want HD here at [facility].  Usually a T, Th, S schedule for HD.  Last HD in prep for OR was 8/21.   Patient w/o signs of acute failure.  Is adamant that he go home today and go for scheduled for tomorrow at 0500, does make urine -voiding, states that for years at HD, because he voids, that they do not remove any fluids during HD.  Discussed risks of going home w/o HD today but patient is aware and wants to go home nonetheless.  Cr increased to 6.85.    ID: Afebrile, WBC normal, off antibiotics  Fluids: None  Endocrine/Electrolytes: Lytes adequate, hyperphosphotemia noted -due for HD tomorrow  Skin:  No rashes, graft functioning w/adequate perfusion to L hand  Lines: R chest wall permacath clean and dry    Patient w/o acute critical care issues.  Will transfer from critical care setting.  For likely discharge home per vascular surgery team.

## 2024-08-27 ENCOUNTER — APPOINTMENT (OUTPATIENT)
Dept: CARDIOLOGY | Facility: CLINIC | Age: 64
End: 2024-08-27
Payer: MEDICARE

## 2024-08-27 ENCOUNTER — APPOINTMENT (OUTPATIENT)
Dept: CARDIOLOGY | Facility: CLINIC | Age: 64
End: 2024-08-27

## 2024-08-27 VITALS
HEIGHT: 68 IN | HEART RATE: 72 BPM | DIASTOLIC BLOOD PRESSURE: 80 MMHG | WEIGHT: 151 LBS | BODY MASS INDEX: 22.88 KG/M2 | SYSTOLIC BLOOD PRESSURE: 190 MMHG | OXYGEN SATURATION: 98 %

## 2024-08-27 DIAGNOSIS — I10 ESSENTIAL (PRIMARY) HYPERTENSION: ICD-10-CM

## 2024-08-27 DIAGNOSIS — I25.10 ATHEROSCLEROTIC HEART DISEASE OF NATIVE CORONARY ARTERY W/OUT ANGINA PECTORIS: ICD-10-CM

## 2024-08-27 PROCEDURE — 93000 ELECTROCARDIOGRAM COMPLETE: CPT

## 2024-08-27 PROCEDURE — 99215 OFFICE O/P EST HI 40 MIN: CPT

## 2024-09-06 LAB — HUMAN ERYTHROCYTE ANTIGEN PANEL RESULT: SIGNIFICANT CHANGE UP

## 2024-09-06 PROCEDURE — 0001U RBC DNA HEA 35 AG 11 BLD GRP: CPT

## 2024-09-06 PROCEDURE — 84132 ASSAY OF SERUM POTASSIUM: CPT

## 2024-09-06 PROCEDURE — 82435 ASSAY OF BLOOD CHLORIDE: CPT

## 2024-09-06 PROCEDURE — 93005 ELECTROCARDIOGRAM TRACING: CPT

## 2024-09-06 PROCEDURE — 86901 BLOOD TYPING SEROLOGIC RH(D): CPT

## 2024-09-06 PROCEDURE — 82962 GLUCOSE BLOOD TEST: CPT

## 2024-09-06 PROCEDURE — C1768: CPT

## 2024-09-06 PROCEDURE — 86850 RBC ANTIBODY SCREEN: CPT

## 2024-09-06 PROCEDURE — 83735 ASSAY OF MAGNESIUM: CPT

## 2024-09-06 PROCEDURE — C1889: CPT

## 2024-09-06 PROCEDURE — 80048 BASIC METABOLIC PNL TOTAL CA: CPT

## 2024-09-06 PROCEDURE — 84484 ASSAY OF TROPONIN QUANT: CPT

## 2024-09-06 PROCEDURE — 86880 COOMBS TEST DIRECT: CPT

## 2024-09-06 PROCEDURE — 82330 ASSAY OF CALCIUM: CPT

## 2024-09-06 PROCEDURE — 36415 COLL VENOUS BLD VENIPUNCTURE: CPT

## 2024-09-06 PROCEDURE — 85730 THROMBOPLASTIN TIME PARTIAL: CPT

## 2024-09-06 PROCEDURE — 84100 ASSAY OF PHOSPHORUS: CPT

## 2024-09-06 PROCEDURE — 86870 RBC ANTIBODY IDENTIFICATION: CPT

## 2024-09-06 PROCEDURE — 85027 COMPLETE CBC AUTOMATED: CPT

## 2024-09-06 PROCEDURE — 36830 ARTERY-VEIN NONAUTOGRAFT: CPT

## 2024-09-06 PROCEDURE — 87640 STAPH A DNA AMP PROBE: CPT

## 2024-09-06 PROCEDURE — 85018 HEMOGLOBIN: CPT

## 2024-09-06 PROCEDURE — 82947 ASSAY GLUCOSE BLOOD QUANT: CPT

## 2024-09-06 PROCEDURE — 85014 HEMATOCRIT: CPT

## 2024-09-06 PROCEDURE — 83605 ASSAY OF LACTIC ACID: CPT

## 2024-09-06 PROCEDURE — 85610 PROTHROMBIN TIME: CPT

## 2024-09-06 PROCEDURE — 82803 BLOOD GASES ANY COMBINATION: CPT

## 2024-09-06 PROCEDURE — 84295 ASSAY OF SERUM SODIUM: CPT

## 2024-09-06 PROCEDURE — 86900 BLOOD TYPING SEROLOGIC ABO: CPT

## 2024-09-06 PROCEDURE — 71045 X-RAY EXAM CHEST 1 VIEW: CPT

## 2024-09-06 PROCEDURE — 94002 VENT MGMT INPAT INIT DAY: CPT

## 2024-09-06 PROCEDURE — 87641 MR-STAPH DNA AMP PROBE: CPT

## 2024-09-06 PROCEDURE — 94003 VENT MGMT INPAT SUBQ DAY: CPT

## 2024-09-06 PROCEDURE — 94640 AIRWAY INHALATION TREATMENT: CPT

## 2024-09-06 PROCEDURE — 80053 COMPREHEN METABOLIC PANEL: CPT

## 2024-09-09 ENCOUNTER — APPOINTMENT (OUTPATIENT)
Dept: ELECTROPHYSIOLOGY | Facility: CLINIC | Age: 64
End: 2024-09-09
Payer: MEDICARE

## 2024-09-09 ENCOUNTER — NON-APPOINTMENT (OUTPATIENT)
Age: 64
End: 2024-09-09

## 2024-09-09 VITALS
BODY MASS INDEX: 22.88 KG/M2 | DIASTOLIC BLOOD PRESSURE: 72 MMHG | WEIGHT: 151 LBS | HEIGHT: 68 IN | HEART RATE: 74 BPM | SYSTOLIC BLOOD PRESSURE: 130 MMHG | OXYGEN SATURATION: 98 %

## 2024-09-09 DIAGNOSIS — Z86.79 OTHER SPECIFIED POSTPROCEDURAL STATES: ICD-10-CM

## 2024-09-09 DIAGNOSIS — Z98.890 OTHER SPECIFIED POSTPROCEDURAL STATES: ICD-10-CM

## 2024-09-09 PROBLEM — Z95.818 PRESENCE OF WATCHMAN LEFT ATRIAL APPENDAGE CLOSURE DEVICE: Status: ACTIVE | Noted: 2024-09-09

## 2024-09-09 PROCEDURE — 99214 OFFICE O/P EST MOD 30 MIN: CPT

## 2024-09-09 PROCEDURE — 93000 ELECTROCARDIOGRAM COMPLETE: CPT

## 2024-09-16 NOTE — DISCHARGE NOTE NURSING/CASE MANAGEMENT/SOCIAL WORK - BRAND OF COVID-19 VACCINATION
F: s/p 500cc NS in ED  E: replete prn  D: pending bedside dysphagia  GI: none  DVT: lovenox  Code: DNR/DNI    Dispo: [facility] Pfizer dose 1 and 2 F: s/p 500cc NS in ED  E: replete prn  D: pending bedside dysphagia  GI: none  DVT: lovenox  Code: DNR/DNI    Dispo: [facility]

## 2024-09-17 ENCOUNTER — APPOINTMENT (OUTPATIENT)
Dept: CARDIOLOGY | Facility: CLINIC | Age: 64
End: 2024-09-17
Payer: MEDICARE

## 2024-09-17 VITALS — SYSTOLIC BLOOD PRESSURE: 220 MMHG | DIASTOLIC BLOOD PRESSURE: 84 MMHG

## 2024-09-17 VITALS
DIASTOLIC BLOOD PRESSURE: 80 MMHG | BODY MASS INDEX: 22.88 KG/M2 | OXYGEN SATURATION: 96 % | SYSTOLIC BLOOD PRESSURE: 236 MMHG | WEIGHT: 151 LBS | HEIGHT: 68 IN | HEART RATE: 63 BPM

## 2024-09-17 DIAGNOSIS — I25.10 ATHEROSCLEROTIC HEART DISEASE OF NATIVE CORONARY ARTERY W/OUT ANGINA PECTORIS: ICD-10-CM

## 2024-09-17 DIAGNOSIS — I10 ESSENTIAL (PRIMARY) HYPERTENSION: ICD-10-CM

## 2024-09-17 DIAGNOSIS — Z95.818 PRESENCE OF OTHER CARDIAC IMPLANTS AND GRAFTS: ICD-10-CM

## 2024-09-17 PROCEDURE — 99214 OFFICE O/P EST MOD 30 MIN: CPT

## 2024-09-17 PROCEDURE — 93000 ELECTROCARDIOGRAM COMPLETE: CPT

## 2024-09-17 NOTE — HISTORY OF PRESENT ILLNESS
[FreeTextEntry1] : Mr. Cummins is a pleasant 64-year-old male here for follow up today. The patient has a history of aortic dissection s/p repair, CAD s/p CABG, aortic and mitral valve disease s/p AVR and MV repair, bowel ischemia s/p resection, ESRD previously on dialysis, HTN, AVMs s/p cauterization, and atrial fibrillation/flutter s/p PVI+CTI+mitral line in 9/2021, PVCs, recently spontaneous subdural/subarachnoid hemorrhage and mitral valve endocarditis.   The patient was admitted to [facility] in 6/2023 after being found down at home and subsequently noted to have sudural/subarachnoid hemorrhage. Eliquis was stopped. TTE at the time noted a possible echogenicity on the mitral valve but MUSTAPHA was deferred because of his condition. He was eventually discharged home off eliquis. Neurosurgery eventually cleared him to resume eliquis. Eliquis 5 mg bid was resumed on 8/3/23. MUSTAPHA was performed as outpatient given suspicion for MV density. No vegetation was seen. BLAINE measurements were taken. LVEF was reported as 60%. He was referred for a LAAO/Watchman evaluation and agreed to undergo the procedure when I last saw him in office in 8/2023. Unfortunately, the patient developed mitral valve endocarditis and was hospitalized for it in 9/2023. He completed 6 weeks of antibiotic therapy, and a repeat MUSTAPHA was negative for any vegetations. AV fistula thought to be responsible for the infection has now been removed. He is here again to discuss the BLAINE occlusion/Watchman implant. He was on eliquis 5mg BID  He arrives today s/p WATCHMAN implantation on 8/2/24 with Dr Mosqueda (Dr Trevino).  He reports not resuming eliquis at 2.5mg BID post procedure due to fear of "bleeding".  Reports that he had and episode of BRBPR 2 weeks ago.  He did not seek urgent attention, he feels well.  Bleeding had not returned. The patient denies any symptoms of palpitations, shortness of breath, dizziness, chest pain, syncope or extremity edema.

## 2024-09-17 NOTE — REVIEW OF SYSTEMS
[Negative] : Heme/Lymph [SOB] : no shortness of breath [Dyspnea on exertion] : not dyspnea during exertion [Chest Discomfort] : no chest discomfort [Lower Ext Edema] : no extremity edema [Leg Claudication] : no intermittent leg claudication [Palpitations] : no palpitations [Orthopnea] : no orthopnea [PND] : no PND [Syncope] : no syncope [FreeTextEntry5] : see HPI [FreeTextEntry7] : BRBPR 2 weeks ago [de-identified] : RT groin site intact , no hematoma

## 2024-09-17 NOTE — CARDIOLOGY SUMMARY
[de-identified] : 1/24/24: Accelerated junctional rhythm 9/9/24: junctianal/bradycardia at 50 bpm  [de-identified] : 8/2/24 MUSTAPHA: LVEF 55-60% NML LV function  no BLAINE thrombus,  BIO MVR/AVR well seated.

## 2024-09-17 NOTE — PHYSICAL EXAM
[Well Developed] : well developed [Normal Venous Pressure] : normal venous pressure [Rhythm Regular] : regular [Normal S1] : normal S1 [Normal S2] : normal S2 [II] : a grade 2 [No Pitting Edema] : no pitting edema present [No Abnormalities] : the abdominal aorta was not enlarged and no bruit was heard [Clear Lung Fields] : clear lung fields [Good Air Entry] : good air entry [Soft] : abdomen soft [Non Tender] : non-tender [Normal Gait] : normal gait [No Edema] : no edema [No Cyanosis] : no cyanosis [No Rash] : no rash [Moves all extremities] : moves all extremities [Alert and Oriented] : alert and oriented [Normal memory] : normal memory [de-identified] : RT grion access site intact , no hematoma

## 2024-09-17 NOTE — PHYSICAL EXAM
[Well Developed] : well developed [Normal Venous Pressure] : normal venous pressure [Rhythm Regular] : regular [Normal S1] : normal S1 [Normal S2] : normal S2 [II] : a grade 2 [No Pitting Edema] : no pitting edema present [No Abnormalities] : the abdominal aorta was not enlarged and no bruit was heard [Clear Lung Fields] : clear lung fields [Good Air Entry] : good air entry [Soft] : abdomen soft [Non Tender] : non-tender [Normal Gait] : normal gait [No Edema] : no edema [No Cyanosis] : no cyanosis [No Rash] : no rash [Moves all extremities] : moves all extremities [Alert and Oriented] : alert and oriented [Normal memory] : normal memory [de-identified] : RT grion access site intact , no hematoma

## 2024-09-17 NOTE — DISCUSSION/SUMMARY
[EKG obtained to assist in diagnosis and management of assessed problem(s)] : EKG obtained to assist in diagnosis and management of assessed problem(s) [FreeTextEntry1] : In summary, MR Cummins is a 64-year-old male with a history of atrial fibrillation/flutter s/p ablation, CAD s/p CABG, valve disease s/p AVR and MV repair, mild LV dysfunction (now recovered), aortic dissection s/p repair, CKD (not on dialysis), small bowel ischemia s/p resection and recently subdural/subarachnoid hemorrhage while on eliquis. His CHADSVASC score is 3. On his previous visit he had agreed to undergo left atrial appendage occlusion/Watchman implant. Unfortunately, he later developed mitral valve endocarditis and LAAO procedure had to be postponed. He has now completed antibiotic therapy for the endocarditis and MUSTAPHA demonstrated resolution as well.   He arrives today for s/p WATCHMAN on 8/2/24. He arrives in no acute distress. He appears to be compensated from a volume perspective. ECG illustrates junctional bradycardia, tracings unchanged from previous (known junctional rhythm), his blood pressure is controlled.  He has not been taking thromboembolic prevention with eliquis 2.5mg BID as instructed post procedure due to concerns for bleeding.  He does understand the risks of not taking oral A/C at this time including stroke.  He is willing to take ASA 81 only (also refused plavix).  He is scheduled for 45 day post procedure MUSTAPHA on 9/19/24.  His Access site is intact, without hematoma, pain or evidence of infection.  He will continue all other medical management for HR and B/P control including carvedilol 6.25mg BID.  He will follow up with general cardiology for all other needs. Will follow up with Dr Trevino for post MUSTAPHA assessment in November.

## 2024-09-17 NOTE — END OF VISIT
[FreeTextEntry3] : I, Dr. Trevino, personally performed the evaluation and management (E/M) services for this new patient. That E/M includes conducting the clinically appropriate initial history &/or exam, assessing all conditions, and establishing the plan of care. Today, my assistant, Kiersten Castellon NP, was here to observe my evaluation and management service for this patient & follow plan of care established by me going forward.I, Dr. Trevino, personally performed the evaluation and management (E/M) services for this new patient. That E/M includes conducting the clinically appropriate initial history &/or exam, assessing all conditions, and establishing the plan of care. Today, my assistant, Kriss Bach NP, was here to observe my evaluation and management service for this patient & follow plan of care established by me going forward.

## 2024-09-17 NOTE — REVIEW OF SYSTEMS
[Negative] : Heme/Lymph [SOB] : no shortness of breath [Dyspnea on exertion] : not dyspnea during exertion [Chest Discomfort] : no chest discomfort [Lower Ext Edema] : no extremity edema [Leg Claudication] : no intermittent leg claudication [Palpitations] : no palpitations [Orthopnea] : no orthopnea [PND] : no PND [Syncope] : no syncope [FreeTextEntry5] : see HPI [FreeTextEntry7] : BRBPR 2 weeks ago [de-identified] : RT groin site intact , no hematoma

## 2024-09-17 NOTE — CARDIOLOGY SUMMARY
[de-identified] : 1/24/24: Accelerated junctional rhythm 9/9/24: junctianal/bradycardia at 50 bpm  [de-identified] : 8/2/24 MUSTAPHA: LVEF 55-60% NML LV function  no BLAINE thrombus,  BIO MVR/AVR well seated.

## 2024-09-17 NOTE — HISTORY OF PRESENT ILLNESS
[FreeTextEntry1] : Mr. Cummins is a pleasant 64-year-old male here for follow up today. The patient has a history of aortic dissection s/p repair, CAD s/p CABG, aortic and mitral valve disease s/p AVR and MV repair, bowel ischemia s/p resection, ESRD previously on dialysis, HTN, AVMs s/p cauterization, and atrial fibrillation/flutter s/p PVI+CTI+mitral line in 9/2021, PVCs, recently spontaneous subdural/subarachnoid hemorrhage and mitral valve endocarditis.   The patient was admitted to [facility] in 6/2023 after being found down at home and subsequently noted to have sudural/subarachnoid hemorrhage. Eliquis was stopped. TTE at the time noted a possible echogenicity on the mitral valve but MUSTAPHA was deferred because of his condition. He was eventually discharged home off eliquis. Neurosurgery eventually cleared him to resume eliquis. Eliquis 5 mg bid was resumed on 8/3/23. MUSTAPHA was performed as outpatient given suspicion for MV density. No vegetation was seen. BLAINE measurements were taken. LVEF was reported as 60%. He was referred for a LAAO/Watchman evaluation and agreed to undergo the procedure when I last saw him in office in 8/2023. Unfortunately, the patient developed mitral valve endocarditis and was hospitalized for it in 9/2023. He completed 6 weeks of antibiotic therapy, and a repeat MUSTAPHA was negative for any vegetations. AV fistula thought to be responsible for the infection has now been removed. He is here again to discuss the BLAINE occlusion/Watchman implant. He was on eliquis 5mg BID  He arrives today s/p WATCHMAN implantation on 8/2/24 with Dr Mosqueda (Dr Trevino).  He reports not resuming eliquis at 2.5mg BID post procedure due to fear of "bleeding".  Reports that he had and episode of BRBPR 2 weeks ago.  He did not seek urgent attention, he feels well.  Bleeding had not returned. The patient denies any symptoms of palpitations, shortness of breath, dizziness, chest pain, syncope or extremity edema.

## 2024-09-19 ENCOUNTER — OUTPATIENT (OUTPATIENT)
Dept: OUTPATIENT SERVICES | Facility: HOSPITAL | Age: 64
LOS: 1 days | End: 2024-09-19
Payer: MEDICARE

## 2024-09-19 ENCOUNTER — TRANSCRIPTION ENCOUNTER (OUTPATIENT)
Age: 64
End: 2024-09-19

## 2024-09-19 ENCOUNTER — RESULT REVIEW (OUTPATIENT)
Age: 64
End: 2024-09-19

## 2024-09-19 VITALS
RESPIRATION RATE: 16 BRPM | HEART RATE: 46 BPM | OXYGEN SATURATION: 98 % | DIASTOLIC BLOOD PRESSURE: 76 MMHG | SYSTOLIC BLOOD PRESSURE: 134 MMHG

## 2024-09-19 VITALS
HEART RATE: 46 BPM | RESPIRATION RATE: 16 BRPM | OXYGEN SATURATION: 99 % | TEMPERATURE: 98 F | SYSTOLIC BLOOD PRESSURE: 155 MMHG | DIASTOLIC BLOOD PRESSURE: 69 MMHG

## 2024-09-19 DIAGNOSIS — I77.0 ARTERIOVENOUS FISTULA, ACQUIRED: Chronic | ICD-10-CM

## 2024-09-19 DIAGNOSIS — Z94.0 KIDNEY TRANSPLANT STATUS: Chronic | ICD-10-CM

## 2024-09-19 DIAGNOSIS — Z86.79 PERSONAL HISTORY OF OTHER DISEASES OF THE CIRCULATORY SYSTEM: Chronic | ICD-10-CM

## 2024-09-19 DIAGNOSIS — Z95.2 PRESENCE OF PROSTHETIC HEART VALVE: Chronic | ICD-10-CM

## 2024-09-19 DIAGNOSIS — I48.91 UNSPECIFIED ATRIAL FIBRILLATION: ICD-10-CM

## 2024-09-19 DIAGNOSIS — Z90.49 ACQUIRED ABSENCE OF OTHER SPECIFIED PARTS OF DIGESTIVE TRACT: Chronic | ICD-10-CM

## 2024-09-19 DIAGNOSIS — Z95.818 PRESENCE OF OTHER CARDIAC IMPLANTS AND GRAFTS: Chronic | ICD-10-CM

## 2024-09-19 DIAGNOSIS — Z95.1 PRESENCE OF AORTOCORONARY BYPASS GRAFT: Chronic | ICD-10-CM

## 2024-09-19 LAB
ANION GAP SERPL CALC-SCNC: 16 MMOL/L — SIGNIFICANT CHANGE UP (ref 5–17)
BUN SERPL-MCNC: 69.3 MG/DL — HIGH (ref 8–20)
CALCIUM SERPL-MCNC: 8.2 MG/DL — LOW (ref 8.4–10.5)
CHLORIDE SERPL-SCNC: 101 MMOL/L — SIGNIFICANT CHANGE UP (ref 96–108)
CO2 SERPL-SCNC: 24 MMOL/L — SIGNIFICANT CHANGE UP (ref 22–29)
CREAT SERPL-MCNC: 6.72 MG/DL — HIGH (ref 0.5–1.3)
EGFR: 9 ML/MIN/1.73M2 — LOW
GLUCOSE SERPL-MCNC: 106 MG/DL — HIGH (ref 70–99)
HCT VFR BLD CALC: 26.1 % — LOW (ref 39–50)
HGB BLD-MCNC: 8.1 G/DL — LOW (ref 13–17)
MAGNESIUM SERPL-MCNC: 2 MG/DL — SIGNIFICANT CHANGE UP (ref 1.8–2.6)
MCHC RBC-ENTMCNC: 30.8 PG — SIGNIFICANT CHANGE UP (ref 27–34)
MCHC RBC-ENTMCNC: 31 GM/DL — LOW (ref 32–36)
MCV RBC AUTO: 99.2 FL — SIGNIFICANT CHANGE UP (ref 80–100)
PLATELET # BLD AUTO: 155 K/UL — SIGNIFICANT CHANGE UP (ref 150–400)
POTASSIUM SERPL-MCNC: 4.3 MMOL/L — SIGNIFICANT CHANGE UP (ref 3.5–5.3)
POTASSIUM SERPL-SCNC: 4.3 MMOL/L — SIGNIFICANT CHANGE UP (ref 3.5–5.3)
RBC # BLD: 2.63 M/UL — LOW (ref 4.2–5.8)
RBC # FLD: 14.6 % — HIGH (ref 10.3–14.5)
SODIUM SERPL-SCNC: 141 MMOL/L — SIGNIFICANT CHANGE UP (ref 135–145)
WBC # BLD: 4.78 K/UL — SIGNIFICANT CHANGE UP (ref 3.8–10.5)
WBC # FLD AUTO: 4.78 K/UL — SIGNIFICANT CHANGE UP (ref 3.8–10.5)

## 2024-09-19 PROCEDURE — 85027 COMPLETE CBC AUTOMATED: CPT

## 2024-09-19 PROCEDURE — 93320 DOPPLER ECHO COMPLETE: CPT | Mod: 26

## 2024-09-19 PROCEDURE — 93312 ECHO TRANSESOPHAGEAL: CPT | Mod: 26

## 2024-09-19 PROCEDURE — 36415 COLL VENOUS BLD VENIPUNCTURE: CPT

## 2024-09-19 PROCEDURE — 76376 3D RENDER W/INTRP POSTPROCES: CPT

## 2024-09-19 PROCEDURE — 93320 DOPPLER ECHO COMPLETE: CPT

## 2024-09-19 PROCEDURE — 83735 ASSAY OF MAGNESIUM: CPT

## 2024-09-19 PROCEDURE — 93005 ELECTROCARDIOGRAM TRACING: CPT

## 2024-09-19 PROCEDURE — 93312 ECHO TRANSESOPHAGEAL: CPT

## 2024-09-19 PROCEDURE — 80048 BASIC METABOLIC PNL TOTAL CA: CPT

## 2024-09-19 PROCEDURE — 76376 3D RENDER W/INTRP POSTPROCES: CPT | Mod: 26

## 2024-09-19 PROCEDURE — 93325 DOPPLER ECHO COLOR FLOW MAPG: CPT

## 2024-09-19 PROCEDURE — 93010 ELECTROCARDIOGRAM REPORT: CPT

## 2024-09-19 PROCEDURE — 93325 DOPPLER ECHO COLOR FLOW MAPG: CPT | Mod: 26

## 2024-09-19 RX ORDER — ASPIRIN 81 MG
1 TABLET, DELAYED RELEASE (ENTERIC COATED) ORAL
Refills: 0 | DISCHARGE

## 2024-09-19 NOTE — PROGRESS NOTE ADULT - SUBJECTIVE AND OBJECTIVE BOX
Pt doing well s/p uncomplicated MUSTAPHA (45d post Watchman). MUSTAPHA notable for a well seated device without leaks or clots. Denies complaint.     Exam:   VSS, NAD, sleepy   Card: S1/S2, regular, bradycardic   Resp: lungs CTA b/l  Abd: S/NT/ND  Ext: no edema    Assessment:   63 yo male with PMH of paroxysmal atrial fibrillation/flutter s/p ablation, valvular disease and CAD s/p CABG, AVR and MV repair, mild LV dysfunction (now recovered), aortic dissection s/p repair, ESRD on HD Tu/TH/Sat, small bowel ischemia s/p resection and subdural/subarachnoid hemorrhage while on Eliquis. He underwent a BLAINE occlusion with Watchman device implant (27mm) on 8/2/2024. He was initially on low dose Eliquis following his procedure but has since refusing citing concerns for bleeding. He presented electively today for his 45 day post Watchman MUSTAPHA.     Now status post uncomplicated MUSTAPHA. MUSTAPHA notable for a well seated device without leaks or clots.    Plan:   Observation on telemetry per post op protocol.    Resume PO intake.   Ambulate w/ assist once fully awake & back to baseline mental status w/ VSS.  Continue aspirin therapy alone as patient refusing DAPT.   Resume other home medications.   Anticipate d/c home once all criteria met, with outpt f/up in 1 month.    Pt doing well s/p uncomplicated MUSTAPHA (45d post Watchman). MUSTAPHA notable for a well seated device without leaks or clots. Denies complaint.     Exam:   VSS, NAD, sleepy   Card: S1/S2, regular, bradycardic   Resp: lungs CTA b/l  Abd: S/NT/ND  Ext: no edema    Assessment:   65 yo male with PMH of paroxysmal atrial fibrillation/flutter s/p ablation, valvular disease and CAD s/p CABG, AVR and MV repair, mild LV dysfunction (now recovered), aortic dissection s/p repair, ESRD on HD Tu/TH/Sat, small bowel ischemia s/p resection and subdural/subarachnoid hemorrhage while on Eliquis. He underwent a BLAINE occlusion with Watchman device implant (27mm) on 8/2/2024. He was instructed to start low dose Eliquis following his procedure but has refusing citing concerns for bleeding. He presented electively today for his 45 day post Watchman MUSTAPHA.     Now status post uncomplicated MUSTAPHA. MUSTAPHA notable for a well seated device without leaks or clots. He is agreeable to starting DAPT.    Plan:   Observation on telemetry per post op protocol.    Resume PO intake.   Ambulate w/ assist once fully awake & back to baseline mental status w/ VSS.  Start DAPT with aspirin 81mg once daily plus Plavix 75mg once daily until 6months post implant, at which time will plan to transitioning to single antiplatelet therapy alone with aspirin.   Resume other home medications.   Anticipate d/c home once all criteria met, with outpt f/up in 1 month.    Pt doing well s/p uncomplicated MUSTAPHA (45d post Watchman). MUSTAPHA notable for a well seated device without leaks or clots. Denies complaint.     Exam:   VSS, NAD, sleepy   Card: S1/S2, regular, bradycardic   Resp: lungs CTA b/l  Abd: S/NT/ND  Ext: no edema    Assessment:   65 yo male with PMH of paroxysmal atrial fibrillation/flutter s/p ablation, valvular disease, CAD s/p CABG x 2, AVR, MVR 11/2020 with Dr. 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 He underwent a BLAINE occlusion with Watchman device implant (27mm) on 8/2/2024. He was instructed to start low dose Eliquis following his procedure but has refusing citing concerns for bleeding. He presented electively today for his 45 day post Watchman MUSTAPHA.     Now status post uncomplicated MUSTAPHA. MUSTAPHA notable for a well seated device without leaks or clots. He is agreeable to starting DAPT.    Plan:   Observation on telemetry per post op protocol.    Resume PO intake.   Ambulate w/ assist once fully awake & back to baseline mental status w/ VSS.  Start DAPT with aspirin 81mg once daily plus Plavix 75mg once daily until 6months post implant, at which time will plan to transitioning to single antiplatelet therapy alone with aspirin.   Resume other home medications.   Anticipate d/c home once all criteria met, with outpt f/up in 1 month.

## 2024-09-19 NOTE — DISCHARGE NOTE PROVIDER - CARE PROVIDERS DIRECT ADDRESSES
[email or web address]

## 2024-09-19 NOTE — H&P PST ADULT - ASSESSMENT
64 year old male with a history of paroxysmal atrial fibrillation/flutter s/p ablation, CAD s/p CABG, valve disease s/p AVR and MV repair, mild LV dysfunction (now recovered), aortic dissection s/p repair, CKD/ESRD HD on Tu/TH/Sat, small bowel ischemia s/p resection and h/o subdural/subarachnoid hemorrhage while on Eliquis. Underwent a BLAINE occlusion with Watchman device implant (27mm) on 8/2/2024. Presents in fasting state in anticipation of 45 day post Watchman MUSTAPHA    - Only on ASA now  - Arrived in fasting state  - PST labs drawn and pending

## 2024-09-19 NOTE — DISCHARGE NOTE PROVIDER - NSDCMRMEDTOKEN_GEN_ALL_CORE_FT
amLODIPine 10 mg oral tablet: 1 tab(s) orally once a day  atorvastatin 40 mg oral tablet: 1 tab(s) orally once a day (at bedtime)  carvedilol 6.25 mg oral tablet: 1 tab(s) orally every 12 hours  cloNIDine 0.2 mg oral tablet: 1 tab(s) orally 2 times a day  hydrALAZINE 50 mg oral tablet: 1 tab(s) orally 2 times a day  isosorbide mononitrate 30 mg oral tablet, extended release: 1 tab(s) orally once a day  levETIRAcetam 1000 mg oral tablet: 1 tab(s) orally 2 times a day  Nephro-Russel oral tablet: 1 tab(s) orally once a day  tamsulosin 0.4 mg oral capsule: 1 cap(s) orally once a day (at bedtime)  torsemide 10 mg oral tablet: 1 tab(s) orally every other day   amLODIPine 10 mg oral tablet: 1 tab(s) orally once a day  Aspi-Cor 81 mg oral delayed release tablet: 1 tab(s) orally once a day  atorvastatin 40 mg oral tablet: 1 tab(s) orally once a day (at bedtime)  carvedilol 6.25 mg oral tablet: 1 tab(s) orally every 12 hours  cloNIDine 0.2 mg oral tablet: 1 tab(s) orally 2 times a day  hydrALAZINE 50 mg oral tablet: 1 tab(s) orally 2 times a day  isosorbide mononitrate 30 mg oral tablet, extended release: 1 tab(s) orally once a day  levETIRAcetam 1000 mg oral tablet: 1 tab(s) orally 2 times a day  Nephro-Russel oral tablet: 1 tab(s) orally once a day  Plavix 75 mg oral tablet: 1 tab(s) orally once a day  tamsulosin 0.4 mg oral capsule: 1 cap(s) orally once a day (at bedtime)  torsemide 10 mg oral tablet: 1 tab(s) orally every other day

## 2024-09-19 NOTE — ASU PATIENT PROFILE, ADULT - NSCAFFEAMTFREQ_GEN_ALL_CORE_SD
You can access the FollowMyHealth Patient Portal offered by [facility] by registering at the following website: [email or web address]. By joining [facility]’s FollowMyHealth portal, you will also be able to view your health information using other applications (apps) compatible with our system.
1-2 cups/cans per day

## 2024-09-19 NOTE — DISCHARGE NOTE NURSING/CASE MANAGEMENT/SOCIAL WORK - PATIENT PORTAL LINK FT
You can access the FollowMyHealth Patient Portal offered by [facility] by registering at the following website: [email or web address]. By joining [facility]’s FollowMyHealth portal, you will also be able to view your health information using other applications (apps) compatible with our system.

## 2024-09-19 NOTE — H&P PST ADULT - HISTORY OF PRESENT ILLNESS
64 year old male with a history of atrial fibrillation/flutter s/p ablation, CAD s/p CABG, valve disease s/p AVR and MV repair, mild LV dysfunction (now recovered), aortic dissection s/p repair, CKD/ESRD HD on Tu/TH/Sat, small bowel ischemia s/p resection and h/o subdural/subarachnoid hemorrhage while on Eliquis. Underwent a BLAINE occlusion with Watchman device implant (27mm) on 8/2/2024. Discharge routinely and returned later in August for elective AV graft creation in [facility] after his prior graft had to be removed 2/2 infection. Surgery went well but after extubation and transport to PACU, patient became hypoxic and agitated necessitating intubation and SICU admission. Extubated on the morning of POD #1. Now presents for 45 day post Watchman MUSTAPHA.     Cardiac Summary:   Echo: 4/2024 LVEF 50-55%, G2DD, normal functioning AVR and MVR, PASP 26 mmHg     64 year old male with a history of paroxysmal atrial fibrillation/flutter s/p ablation, CAD s/p CABG, valve disease s/p AVR and MV repair, mild LV dysfunction (now recovered), aortic dissection s/p repair, CKD/ESRD HD on Tu/TH/Sat, small bowel ischemia s/p resection and h/o subdural/subarachnoid hemorrhage while on Eliquis. Underwent a BLAINE occlusion with Watchman device implant (27mm) on 8/2/2024. Discharge routinely and returned later in August for elective AV graft creation in [facility] after his prior graft had to be removed 2/2 infection. Surgery went well but after extubation and transport to PACU, patient became hypoxic and agitated necessitating intubation and SICU admission. Extubated on the morning of POD #1. Now presents for 45 day post Watchman MUSTAPHA.     Since his last admission he reports feeling well and denies any overt symptoms. Received HD yesterday in anticipation of today's procedure. Remains only on ASA.    Cardiac Summary:   Echo: 4/2024 LVEF 50-55%, G2DD, normal functioning AVR and MVR, PASP 26 mmHg     64 year old male with a history of paroxysmal atrial fibrillation/flutter s/p ablation, CAD s/p CABG, valve disease s/p AVR and MV repair, mild LV dysfunction (now recovered), aortic dissection s/p repair, CKD/ESRD HD on Tu/TH/Sat, small bowel ischemia s/p resection and h/o subdural/subarachnoid hemorrhage while on Eliquis. Underwent a BLAINE occlusion with Watchman device implant (27mm) on 8/2/2024. Discharge routinely and returned later in August for elective AV graft creation in [facility] after his prior graft had to be removed 2/2 infection. Surgery went well but after extubation and transport to PACU, patient became hypoxic and agitated necessitating intubation and SICU admission. Extubated on the morning of POD #1. Now presents for 45 day post Watchman MUSTAPHA.     Since his last admission he reports feeling well and denies any overt symptoms. Received HD yesterday in anticipation of today's procedure. Remains only on ASA. He has not been taking thromboembolic prevention with eliquis 2.5mg BID as instructed post procedure due to concerns for bleeding. He does understand the risks of not taking oral A/C at this time including stroke. He is willing to take ASA 81 only (also refused plavix).    Cardiac Summary:   Echo: 4/2024 LVEF 50-55%, G2DD, normal functioning AVR and MVR, PASP 26 mmHg

## 2024-09-19 NOTE — DISCHARGE NOTE PROVIDER - HOSPITAL COURSE
63 yo male with PMH of paroxysmal atrial fibrillation/flutter s/p ablation, valvular disease and CAD s/p CABG, AVR and MV repair, mild LV dysfunction (now recovered), aortic dissection s/p repair, ESRD on HD Tu/TH/Sat, small bowel ischemia s/p resection and subdural/subarachnoid hemorrhage while on Eliquis. He underwent a BLAINE occlusion with Watchman device implant (27mm) on 8/2/2024. He was initially on low dose Eliquis following his procedure but has since refusing citing concerns for bleeding. He presented electively today for his 45 day post Watchman MUSTAPHA.     Now status post uncomplicated MUSTAPHA. MUSTAPHA notable for a well seated device without leaks or clots. 65 yo male with PMH of paroxysmal atrial fibrillation/flutter s/p ablation, valvular disease, CAD s/p CABG x 2, AVR, MVR 11/2020 with Dr. 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 He underwent a BLAINE occlusion with Watchman device implant (27mm) on 8/2/2024. He was instructed to start low dose Eliquis following his procedure but has refusing citing concerns for bleeding. He presented electively today for his 45 day post Watchman MUSTAPHA.     Now status post uncomplicated MUSTAPHA. MUSTAPHA notable for a well seated device without leaks or clots. He is agreeable to starting DAPT.

## 2024-09-19 NOTE — DISCHARGE NOTE PROVIDER - NSDCCPTREATMENT_GEN_ALL_CORE_FT
PRINCIPAL PROCEDURE  Procedure: Transesophageal echocardiogram (MUSTAPHA)  Findings and Treatment: -Take each dose of your anticoagulation medication (blood thinner) exactly as prescribed.   -Resume your diet with soft foods first.  - Do not drive, operate heavy machinery or make important decisions for 24 hours following the procedure.  - You may resume all other activities the day after the procedure.  Call your doctor if:   -you develop a fever, cough up blood, have any chest pain, palpitations or difficulty breathing. You may take a throat lozenge if you develop a sore throat or try warm salt water gargle.   - you have any questions or concerns regarding the procedure.  If you experience increased difficulty breathing or chest pain, or if you faint, have dizzy spells, or for any other distressing symptom, please seek immediate medical attention.

## 2024-09-19 NOTE — DISCHARGE NOTE PROVIDER - NSDCFUADDAPPT_GEN_ALL_CORE_FT
Our office will contact you in 3-5 days to schedule this appointment. Please call [phone] with questions or concerns. Our office will contact you in 3-5 days to schedule this appointment. Please call [phone] with questions or concerns.    Start Plavix 75 mg once daily up until 6 months post Watchman implant (2/2025).  Continue Aspirin 81mg once daily  Continue all other medications as usual

## 2024-09-19 NOTE — DISCHARGE NOTE NURSING/CASE MANAGEMENT/SOCIAL WORK - NSDCVIVACCINE_GEN_ALL_CORE_FT
influenza, injectable, quadrivalent, preservative free; 12-Dec-2020 09:28; Lynn Gerardo); [employer]; [id number] (Exp. Date: 30-Jun-2021); IntraMuscular; Deltoid Right.; 0.5 milliLiter(s); VIS (VIS Published: 15-Aug-2019, VIS Presented: 12-Dec-2020);

## 2024-09-19 NOTE — H&P PST ADULT - NSICDXPASTMEDICALHX_GEN_ALL_CORE_FT
PAST MEDICAL HISTORY:  Anemia of chronic disease     Aneurysm of artery of upper extremity     Atrial fibrillation     CAD (coronary artery disease) s/p PCI 1998  and CABG x 2 11/2020    CHF (congestive heart failure)     Chronic atrial fibrillation     COVID-19 october 2020    CVA (cerebral vascular accident)     Former smoker     GIB (gastrointestinal bleeding)     H/O aortic dissection s/p repair (2010), surgery complicated by bowel ischemia s/p bowel resection and ostomy with reversal    H/O aortic valve insufficiency     History of cocaine use remote hx, currently sober    HTN (hypertension)     Hyperlipemia     Mitral regurgitation     Seizures last seizure 10 years ago

## 2024-09-19 NOTE — DISCHARGE NOTE PROVIDER - CARE PROVIDER_API CALL
Felice Frankel  Cardiovascular Disease  [address] [facility], [address] [address]  Phone: [phone]  Fax: [phone]  Follow Up Time:     James Trevino  Cardiac Electrophysiology  [address] [facility], [address] [address]  Phone: [phone]  Fax: [phone]  Follow Up Time:

## 2024-09-19 NOTE — DISCHARGE NOTE PROVIDER - NSDCFUSCHEDAPPT_GEN_ALL_CORE_FT
Prasanna Crawford  [facility]  VASCULAR [address]  Scheduled Appointment: 09/20/2024    Felice Frankel  [facility]  CARDIOLOGY [address]  Scheduled Appointment: 10/24/2024    Av Alvarado  [facility]  GASTRO [address]  Scheduled Appointment: 11/06/2024    James Trevino  [facility]  ELECTROPH [address]  Scheduled Appointment: 11/26/2024

## 2024-09-19 NOTE — DISCHARGE NOTE NURSING/CASE MANAGEMENT/SOCIAL WORK - NSDCFUADDAPPT_GEN_ALL_CORE_FT
Our office will contact you in 3-5 days to schedule this appointment. Please call [phone] with questions or concerns.    Start Plavix 75 mg once daily up until 6 months post Watchman implant (2/2025).  Continue Aspirin 81mg once daily  Continue all other medications as usual

## 2024-09-20 ENCOUNTER — APPOINTMENT (OUTPATIENT)
Dept: VASCULAR SURGERY | Facility: CLINIC | Age: 64
End: 2024-09-20
Payer: MEDICARE

## 2024-09-20 VITALS
SYSTOLIC BLOOD PRESSURE: 141 MMHG | RESPIRATION RATE: 16 BRPM | TEMPERATURE: 97.5 F | BODY MASS INDEX: 23.64 KG/M2 | OXYGEN SATURATION: 95 % | WEIGHT: 156 LBS | DIASTOLIC BLOOD PRESSURE: 57 MMHG | HEIGHT: 68 IN | HEART RATE: 46 BPM

## 2024-09-20 DIAGNOSIS — N18.9 CHRONIC KIDNEY DISEASE, UNSPECIFIED: ICD-10-CM

## 2024-09-20 PROCEDURE — 99213 OFFICE O/P EST LOW 20 MIN: CPT | Mod: 24

## 2024-09-20 PROCEDURE — 93990 DOPPLER FLOW TESTING: CPT

## 2024-09-20 NOTE — HISTORY OF PRESENT ILLNESS
[FreeTextEntry1] : 64-year-old male s/p left upper extremity axillary to brachial vein loop graft. Patient doing well post op. Denies fevers and chills.

## 2024-09-20 NOTE — PHYSICAL EXAM
[Normal Rate and Rhythm] : normal rate and rhythm [No Rash or Lesion] : No rash or lesion [Alert] : alert [Oriented to Person] : oriented to person [Oriented to Place] : oriented to place [Oriented to Time] : oriented to time [Calm] : calm [Skin Ulcer] : no ulcer [de-identified] : Appears well, in no acute distress. [de-identified] : normocephalic, atraumatic [FreeTextEntry1] : LUE graft with +thrill, no signs of infection

## 2024-09-20 NOTE — ASSESSMENT
[FreeTextEntry1] : 64-year-old male s/p LUE axillary to brachial vein loop graft. Duplex in office today demonstrates patent AV graft without evidence of restrictive flow and adequate size. Patient may begin HD access via AV graft, letter provided. Patient will need follow up for dialysis catheter removal after successful cannulation.

## 2024-09-29 PROCEDURE — 93355 ECHO TRANSESOPHAGEAL (TEE): CPT

## 2024-09-29 PROCEDURE — 93321 DOPPLER ECHO F-UP/LMTD STD: CPT

## 2024-09-29 PROCEDURE — 76376 3D RENDER W/INTRP POSTPROCES: CPT

## 2024-09-29 PROCEDURE — C1894: CPT

## 2024-09-29 PROCEDURE — 33340 PERQ CLSR TCAT L ATR APNDGE: CPT | Mod: Q0

## 2024-09-29 PROCEDURE — C1769: CPT

## 2024-09-29 PROCEDURE — 93005 ELECTROCARDIOGRAM TRACING: CPT

## 2024-09-29 PROCEDURE — 36415 COLL VENOUS BLD VENIPUNCTURE: CPT

## 2024-09-29 PROCEDURE — C1887: CPT

## 2024-09-29 PROCEDURE — C9399: CPT

## 2024-09-29 PROCEDURE — 83735 ASSAY OF MAGNESIUM: CPT

## 2024-09-29 PROCEDURE — 36430 TRANSFUSION BLD/BLD COMPNT: CPT

## 2024-09-29 PROCEDURE — 85027 COMPLETE CBC AUTOMATED: CPT

## 2024-09-29 PROCEDURE — P9016: CPT

## 2024-09-29 PROCEDURE — 80048 BASIC METABOLIC PNL TOTAL CA: CPT

## 2024-09-29 PROCEDURE — 93325 DOPPLER ECHO COLOR FLOW MAPG: CPT

## 2024-09-29 PROCEDURE — C1889: CPT

## 2024-10-24 ENCOUNTER — APPOINTMENT (OUTPATIENT)
Dept: CARDIOLOGY | Facility: CLINIC | Age: 64
End: 2024-10-24
Payer: MEDICARE

## 2024-10-24 VITALS
SYSTOLIC BLOOD PRESSURE: 158 MMHG | WEIGHT: 148 LBS | HEART RATE: 55 BPM | DIASTOLIC BLOOD PRESSURE: 62 MMHG | BODY MASS INDEX: 22.43 KG/M2 | HEIGHT: 68 IN | OXYGEN SATURATION: 97 %

## 2024-10-24 DIAGNOSIS — I10 ESSENTIAL (PRIMARY) HYPERTENSION: ICD-10-CM

## 2024-10-24 DIAGNOSIS — I42.9 CARDIOMYOPATHY, UNSPECIFIED: ICD-10-CM

## 2024-10-24 DIAGNOSIS — N18.9 CHRONIC KIDNEY DISEASE, UNSPECIFIED: ICD-10-CM

## 2024-10-24 DIAGNOSIS — I48.91 UNSPECIFIED ATRIAL FIBRILLATION: ICD-10-CM

## 2024-10-24 PROCEDURE — 99214 OFFICE O/P EST MOD 30 MIN: CPT

## 2024-10-29 NOTE — DISCHARGE NOTE PROVIDER - ATTENDING DISCHARGE PHYSICAL EXAMINATION:
Department of Anesthesiology  Preprocedure Note       Name:  Terri Boswell   Age:  69 y.o.  :  1955                                          MRN:  [id number]         Date:  10/29/2024      Surgeon: Surgeon(s):  Fei Owens MD    Procedure: Procedure(s):  RADIO GUIDED PARATHYROIDECTOMY    Medications prior to admission:   Prior to Admission medications    Medication Sig Start Date End Date Taking? Authorizing Provider   amLODIPine (NORVASC) 5 MG tablet Take 1 tablet by mouth once daily 10/8/24  Yes Debbie Flores MD   valsartan (DIOVAN) 320 MG tablet Take 1 tablet by mouth once daily 10/7/24  Yes Debbie Flores MD   hydroxychloroquine (PLAQUENIL) 200 MG tablet Take 1 tablet by mouth 2 times daily   Yes Automatic Reconciliation, Ar   sulfaSALAzine (AZULFIDINE) 500 MG tablet Take 2 tablets by mouth 2 times daily   Yes Automatic Reconciliation, Ar   acetaminophen (TYLENOL) 500 MG tablet Take 2 tablets by mouth every 6 hours as needed    Automatic Reconciliation, Ar   Etanercept 50 MG/ML SOAJ Inject 50 mg into the skin every 7 days    Automatic Reconciliation, Ar       Current medications:    Current Facility-Administered Medications   Medication Dose Route Frequency Provider Last Rate Last Admin    ceFAZolin (ANCEF) 2,000 mg in sterile water 20 mL IV syringe  2,000 mg IntraVENous Once Fei Owens MD        lactated ringers IV soln infusion SOLN   IntraVENous Continuous Dylan Dawkins MD           Allergies:    Allergies   Allergen Reactions    Shellfish Allergy Shortness Of Breath    Levofloxacin Rash    Codeine Rash    Penicillins Rash       Problem List:    Patient Active Problem List   Diagnosis Code    Abdominal pain R10.9    Rheumatoid arthritis (HCC) M06.9    History of melanoma Z85.820    HTN (hypertension) I10    Elevated liver function tests R79.89    Adverse drug reaction T50.905A    Long term current use of immunosuppressive drug Z79.899    Transaminitis R74.01    Hypertension  Vital Signs Last 24 Hrs  T(C): 36.9 (16 Oct 2023 08:31), Max: 37.1 (15 Oct 2023 11:53)  T(F): 98.4 (16 Oct 2023 08:31), Max: 98.7 (15 Oct 2023 11:53)  HR: 68 (16 Oct 2023 08:31) (60 - 84)  BP: 100/60 (16 Oct 2023 08:31) (100/57 - 118/63)  BP(mean): --  RR: 17 (16 Oct 2023 08:31) (17 - 18)  SpO2: 94% (16 Oct 2023 08:31) (94% - 98%)    Parameters below as of 16 Oct 2023 08:31  Patient On (Oxygen Delivery Method): room air    GENERAL: NAD  HEENT: PERRL, +EOMI  CHEST/LUNG: Clear to auscultation bilaterally  HEART: S1S2+, Regular rate and rhythm  ABDOMEN: Soft, Nontender, Nondistended; Bowel sounds present  SKIN: warm, dry  NEURO: Awake, alert   PSYCH: calm, cooperative

## 2024-11-04 ENCOUNTER — INPATIENT (INPATIENT)
Facility: HOSPITAL | Age: 64
LOS: 7 days | Discharge: HOME | DRG: 189 | End: 2024-11-12
Attending: STUDENT IN AN ORGANIZED HEALTH CARE EDUCATION/TRAINING PROGRAM | Admitting: HOSPITALIST
Payer: MEDICARE

## 2024-11-04 VITALS
OXYGEN SATURATION: 95 % | RESPIRATION RATE: 28 BRPM | HEART RATE: 137 BPM | DIASTOLIC BLOOD PRESSURE: 106 MMHG | WEIGHT: 135.8 LBS | TEMPERATURE: 98 F | SYSTOLIC BLOOD PRESSURE: 226 MMHG

## 2024-11-04 DIAGNOSIS — Z95.1 PRESENCE OF AORTOCORONARY BYPASS GRAFT: Chronic | ICD-10-CM

## 2024-11-04 DIAGNOSIS — I77.0 ARTERIOVENOUS FISTULA, ACQUIRED: Chronic | ICD-10-CM

## 2024-11-04 DIAGNOSIS — Z90.49 ACQUIRED ABSENCE OF OTHER SPECIFIED PARTS OF DIGESTIVE TRACT: Chronic | ICD-10-CM

## 2024-11-04 DIAGNOSIS — Z86.79 PERSONAL HISTORY OF OTHER DISEASES OF THE CIRCULATORY SYSTEM: Chronic | ICD-10-CM

## 2024-11-04 DIAGNOSIS — Z94.0 KIDNEY TRANSPLANT STATUS: Chronic | ICD-10-CM

## 2024-11-04 DIAGNOSIS — Z95.818 PRESENCE OF OTHER CARDIAC IMPLANTS AND GRAFTS: Chronic | ICD-10-CM

## 2024-11-04 DIAGNOSIS — Z95.2 PRESENCE OF PROSTHETIC HEART VALVE: Chronic | ICD-10-CM

## 2024-11-04 PROCEDURE — 99291 CRITICAL CARE FIRST HOUR: CPT

## 2024-11-04 NOTE — ED ADULT TRIAGE NOTE - CHIEF COMPLAINT QUOTE
pt arrived with SOB that started yesterday pt is a HD pt TTHSAT and he stated he missed Saturday EKG obtained in triage pt has long cardiac hx as per brother

## 2024-11-05 ENCOUNTER — APPOINTMENT (OUTPATIENT)
Dept: VASCULAR SURGERY | Facility: CLINIC | Age: 64
End: 2024-11-05

## 2024-11-05 DIAGNOSIS — R06.00 DYSPNEA, UNSPECIFIED: ICD-10-CM

## 2024-11-05 LAB
ALBUMIN SERPL ELPH-MCNC: 4.6 G/DL — SIGNIFICANT CHANGE UP (ref 3.3–5.2)
ALP SERPL-CCNC: 68 U/L — SIGNIFICANT CHANGE UP (ref 40–120)
ALT FLD-CCNC: 18 U/L — SIGNIFICANT CHANGE UP
ANION GAP SERPL CALC-SCNC: 18 MMOL/L — HIGH (ref 5–17)
ANION GAP SERPL CALC-SCNC: 22 MMOL/L — HIGH (ref 5–17)
APPEARANCE UR: CLEAR — SIGNIFICANT CHANGE UP
APTT BLD: 24.3 SEC — LOW (ref 24.5–35.6)
AST SERPL-CCNC: 38 U/L — SIGNIFICANT CHANGE UP
BACTERIA # UR AUTO: NEGATIVE /HPF — SIGNIFICANT CHANGE UP
BASOPHILS # BLD AUTO: 0.04 K/UL — SIGNIFICANT CHANGE UP (ref 0–0.2)
BASOPHILS NFR BLD AUTO: 0.4 % — SIGNIFICANT CHANGE UP (ref 0–2)
BILIRUB SERPL-MCNC: 2.1 MG/DL — HIGH (ref 0.4–2)
BILIRUB UR-MCNC: NEGATIVE — SIGNIFICANT CHANGE UP
BUN SERPL-MCNC: 106.5 MG/DL — HIGH (ref 8–20)
BUN SERPL-MCNC: 99.8 MG/DL — HIGH (ref 8–20)
CALCIUM SERPL-MCNC: 8.9 MG/DL — SIGNIFICANT CHANGE UP (ref 8.4–10.5)
CALCIUM SERPL-MCNC: 9 MG/DL — SIGNIFICANT CHANGE UP (ref 8.4–10.5)
CHLORIDE SERPL-SCNC: 105 MMOL/L — SIGNIFICANT CHANGE UP (ref 96–108)
CHLORIDE SERPL-SCNC: 105 MMOL/L — SIGNIFICANT CHANGE UP (ref 96–108)
CO2 SERPL-SCNC: 20 MMOL/L — LOW (ref 22–29)
CO2 SERPL-SCNC: 23 MMOL/L — SIGNIFICANT CHANGE UP (ref 22–29)
COLOR SPEC: YELLOW — SIGNIFICANT CHANGE UP
CREAT SERPL-MCNC: 8.43 MG/DL — HIGH (ref 0.5–1.3)
CREAT SERPL-MCNC: 8.85 MG/DL — HIGH (ref 0.5–1.3)
DIFF PNL FLD: ABNORMAL
EGFR: 6 ML/MIN/1.73M2 — LOW
EGFR: 7 ML/MIN/1.73M2 — LOW
EOSINOPHIL # BLD AUTO: 0 K/UL — SIGNIFICANT CHANGE UP (ref 0–0.5)
EOSINOPHIL NFR BLD AUTO: 0 % — SIGNIFICANT CHANGE UP (ref 0–6)
GLUCOSE SERPL-MCNC: 111 MG/DL — HIGH (ref 70–99)
GLUCOSE SERPL-MCNC: 132 MG/DL — HIGH (ref 70–99)
GLUCOSE UR QL: NEGATIVE MG/DL — SIGNIFICANT CHANGE UP
HCT VFR BLD CALC: 25.4 % — LOW (ref 39–50)
HCT VFR BLD CALC: 27.3 % — LOW (ref 39–50)
HGB BLD-MCNC: 7.9 G/DL — LOW (ref 13–17)
HGB BLD-MCNC: 8.5 G/DL — LOW (ref 13–17)
IMM GRANULOCYTES NFR BLD AUTO: 0.6 % — SIGNIFICANT CHANGE UP (ref 0–0.9)
INR BLD: 1.3 RATIO — HIGH (ref 0.85–1.16)
KETONES UR-MCNC: NEGATIVE MG/DL — SIGNIFICANT CHANGE UP
LEUKOCYTE ESTERASE UR-ACNC: NEGATIVE — SIGNIFICANT CHANGE UP
LYMPHOCYTES # BLD AUTO: 0.87 K/UL — LOW (ref 1–3.3)
LYMPHOCYTES # BLD AUTO: 8.3 % — LOW (ref 13–44)
MCHC RBC-ENTMCNC: 29.7 PG — SIGNIFICANT CHANGE UP (ref 27–34)
MCHC RBC-ENTMCNC: 30.3 PG — SIGNIFICANT CHANGE UP (ref 27–34)
MCHC RBC-ENTMCNC: 31.1 G/DL — LOW (ref 32–36)
MCHC RBC-ENTMCNC: 31.1 G/DL — LOW (ref 32–36)
MCV RBC AUTO: 95.5 FL — SIGNIFICANT CHANGE UP (ref 80–100)
MCV RBC AUTO: 97.3 FL — SIGNIFICANT CHANGE UP (ref 80–100)
MONOCYTES # BLD AUTO: 0.63 K/UL — SIGNIFICANT CHANGE UP (ref 0–0.9)
MONOCYTES NFR BLD AUTO: 6 % — SIGNIFICANT CHANGE UP (ref 2–14)
MRSA PCR RESULT.: SIGNIFICANT CHANGE UP
NEUTROPHILS # BLD AUTO: 8.92 K/UL — HIGH (ref 1.8–7.4)
NEUTROPHILS NFR BLD AUTO: 84.7 % — HIGH (ref 43–77)
NITRITE UR-MCNC: NEGATIVE — SIGNIFICANT CHANGE UP
NT-PROBNP SERPL-SCNC: HIGH PG/ML (ref 0–300)
PH UR: 6 — SIGNIFICANT CHANGE UP (ref 5–8)
PLATELET # BLD AUTO: 68 K/UL — LOW (ref 150–400)
PLATELET # BLD AUTO: 82 K/UL — LOW (ref 150–400)
POTASSIUM SERPL-MCNC: 4.3 MMOL/L — SIGNIFICANT CHANGE UP (ref 3.5–5.3)
POTASSIUM SERPL-MCNC: 5.1 MMOL/L — SIGNIFICANT CHANGE UP (ref 3.5–5.3)
POTASSIUM SERPL-SCNC: 4.3 MMOL/L — SIGNIFICANT CHANGE UP (ref 3.5–5.3)
POTASSIUM SERPL-SCNC: 5.1 MMOL/L — SIGNIFICANT CHANGE UP (ref 3.5–5.3)
PROT SERPL-MCNC: 7.5 G/DL — SIGNIFICANT CHANGE UP (ref 6.6–8.7)
PROT UR-MCNC: 300 MG/DL
PROTHROM AB SERPL-ACNC: 15 SEC — HIGH (ref 9.9–13.4)
RBC # BLD: 2.61 M/UL — LOW (ref 4.2–5.8)
RBC # BLD: 2.86 M/UL — LOW (ref 4.2–5.8)
RBC # FLD: 15.5 % — HIGH (ref 10.3–14.5)
RBC # FLD: 15.6 % — HIGH (ref 10.3–14.5)
RBC CASTS # UR COMP ASSIST: 10 /HPF — HIGH (ref 0–4)
S AUREUS DNA NOSE QL NAA+PROBE: SIGNIFICANT CHANGE UP
SODIUM SERPL-SCNC: 146 MMOL/L — HIGH (ref 135–145)
SODIUM SERPL-SCNC: 147 MMOL/L — HIGH (ref 135–145)
SP GR SPEC: 1.02 — SIGNIFICANT CHANGE UP (ref 1–1.03)
SQUAMOUS # UR AUTO: 1 /HPF — SIGNIFICANT CHANGE UP (ref 0–5)
TROPONIN T, HIGH SENSITIVITY RESULT: 148 NG/L — HIGH (ref 0–51)
TROPONIN T, HIGH SENSITIVITY RESULT: 186 NG/L — HIGH (ref 0–51)
TSH SERPL-MCNC: 1.15 UIU/ML — SIGNIFICANT CHANGE UP (ref 0.27–4.2)
UROBILINOGEN FLD QL: 0.2 MG/DL — SIGNIFICANT CHANGE UP (ref 0.2–1)
WBC # BLD: 10.52 K/UL — HIGH (ref 3.8–10.5)
WBC # BLD: 7.86 K/UL — SIGNIFICANT CHANGE UP (ref 3.8–10.5)
WBC # FLD AUTO: 10.52 K/UL — HIGH (ref 3.8–10.5)
WBC # FLD AUTO: 7.86 K/UL — SIGNIFICANT CHANGE UP (ref 3.8–10.5)
WBC UR QL: 1 /HPF — SIGNIFICANT CHANGE UP (ref 0–5)

## 2024-11-05 PROCEDURE — 74174 CTA ABD&PLVS W/CONTRAST: CPT | Mod: 26,MC

## 2024-11-05 PROCEDURE — 71275 CT ANGIOGRAPHY CHEST: CPT | Mod: 26,MC

## 2024-11-05 PROCEDURE — 70450 CT HEAD/BRAIN W/O DYE: CPT | Mod: 26,77

## 2024-11-05 PROCEDURE — 70450 CT HEAD/BRAIN W/O DYE: CPT | Mod: 26,MC

## 2024-11-05 PROCEDURE — 93010 ELECTROCARDIOGRAM REPORT: CPT

## 2024-11-05 RX ORDER — METOPROLOL TARTRATE 50 MG
2.5 TABLET ORAL ONCE
Refills: 0 | Status: COMPLETED | OUTPATIENT
Start: 2024-11-05 | End: 2024-11-05

## 2024-11-05 RX ORDER — LEVETIRACETAM 500 MG/1
1000 TABLET, FILM COATED ORAL
Refills: 0 | Status: DISCONTINUED | OUTPATIENT
Start: 2024-11-05 | End: 2024-11-12

## 2024-11-05 RX ORDER — ISOSORBIDE MONONITRATE 60 MG/1
30 TABLET, EXTENDED RELEASE ORAL DAILY
Refills: 0 | Status: DISCONTINUED | OUTPATIENT
Start: 2024-11-05 | End: 2024-11-12

## 2024-11-05 RX ORDER — LABETALOL HCL 200 MG
20 TABLET ORAL ONCE
Refills: 0 | Status: COMPLETED | OUTPATIENT
Start: 2024-11-05 | End: 2024-11-05

## 2024-11-05 RX ORDER — ACETAMINOPHEN 500 MG
1000 TABLET ORAL ONCE
Refills: 0 | Status: COMPLETED | OUTPATIENT
Start: 2024-11-05 | End: 2024-11-05

## 2024-11-05 RX ORDER — CHLORHEXIDINE GLUCONATE 40 MG/ML
1 SOLUTION TOPICAL
Refills: 0 | Status: DISCONTINUED | OUTPATIENT
Start: 2024-11-05 | End: 2024-11-12

## 2024-11-05 RX ORDER — METOPROLOL TARTRATE 50 MG
5 TABLET ORAL ONCE
Refills: 0 | Status: DISCONTINUED | OUTPATIENT
Start: 2024-11-05 | End: 2024-11-05

## 2024-11-05 RX ORDER — AMLODIPINE BESYLATE 10 MG
5 TABLET ORAL DAILY
Refills: 0 | Status: DISCONTINUED | OUTPATIENT
Start: 2024-11-06 | End: 2024-11-12

## 2024-11-05 RX ORDER — HYDROMORPHONE HCL/0.9% NACL/PF 6 MG/30 ML
0.2 PATIENT CONTROLLED ANALGESIA SYRINGE INTRAVENOUS ONCE
Refills: 0 | Status: DISCONTINUED | OUTPATIENT
Start: 2024-11-05 | End: 2024-11-05

## 2024-11-05 RX ORDER — TORSEMIDE 100 MG/1
10 TABLET ORAL
Refills: 0 | Status: DISCONTINUED | OUTPATIENT
Start: 2024-11-05 | End: 2024-11-12

## 2024-11-05 RX ORDER — METOPROLOL TARTRATE 50 MG
5 TABLET ORAL EVERY 6 HOURS
Refills: 0 | Status: DISCONTINUED | OUTPATIENT
Start: 2024-11-05 | End: 2024-11-12

## 2024-11-05 RX ORDER — NITROGLYCERIN 0.6MG/HR
0.4 PATCH, TRANSDERMAL 24 HOURS TRANSDERMAL ONCE
Refills: 0 | Status: COMPLETED | OUTPATIENT
Start: 2024-11-05 | End: 2024-11-05

## 2024-11-05 RX ORDER — TAMSULOSIN HCL 0.4 MG
0.4 CAPSULE ORAL AT BEDTIME
Refills: 0 | Status: DISCONTINUED | OUTPATIENT
Start: 2024-11-05 | End: 2024-11-12

## 2024-11-05 RX ORDER — CLOPIDOGREL 75 MG/1
75 TABLET ORAL DAILY
Refills: 0 | Status: DISCONTINUED | OUTPATIENT
Start: 2024-11-05 | End: 2024-11-10

## 2024-11-05 RX ORDER — KETOROLAC TROMETHAMINE 30 MG/ML
15 INJECTION INTRAMUSCULAR; INTRAVENOUS ONCE
Refills: 0 | Status: COMPLETED | OUTPATIENT
Start: 2024-11-05 | End: 2024-11-05

## 2024-11-05 RX ORDER — CLONIDINE HYDROCHLORIDE 0.2 MG/1
0.1 TABLET ORAL
Refills: 0 | Status: DISCONTINUED | OUTPATIENT
Start: 2024-11-05 | End: 2024-11-12

## 2024-11-05 RX ORDER — B-COMPLEX WITH VITAMIN C
1 VIAL (ML) INJECTION DAILY
Refills: 0 | Status: DISCONTINUED | OUTPATIENT
Start: 2024-11-05 | End: 2024-11-12

## 2024-11-05 RX ORDER — ONDANSETRON HYDROCHLORIDE 2 MG/ML
4 INJECTION, SOLUTION INTRAMUSCULAR; INTRAVENOUS ONCE
Refills: 0 | Status: COMPLETED | OUTPATIENT
Start: 2024-11-05 | End: 2024-11-05

## 2024-11-05 RX ORDER — CLONIDINE HYDROCHLORIDE 0.2 MG/1
0.2 TABLET ORAL
Refills: 0 | Status: DISCONTINUED | OUTPATIENT
Start: 2024-11-05 | End: 2024-11-05

## 2024-11-05 RX ORDER — MAGNESIUM, ALUMINUM HYDROXIDE 200-200 MG
30 TABLET,CHEWABLE ORAL EVERY 4 HOURS
Refills: 0 | Status: DISCONTINUED | OUTPATIENT
Start: 2024-11-05 | End: 2024-11-12

## 2024-11-05 RX ORDER — ASPIRIN/MAG CARB/ALUMINUM AMIN 325 MG
81 TABLET ORAL DAILY
Refills: 0 | Status: DISCONTINUED | OUTPATIENT
Start: 2024-11-05 | End: 2024-11-12

## 2024-11-05 RX ORDER — HYDRALAZINE HYDROCHLORIDE 50 MG/1
50 TABLET, FILM COATED ORAL
Refills: 0 | Status: DISCONTINUED | OUTPATIENT
Start: 2024-11-05 | End: 2024-11-12

## 2024-11-05 RX ORDER — ERYTHROPOIETIN 10000 [IU]/ML
10000 INJECTION, SOLUTION INTRAVENOUS; SUBCUTANEOUS
Refills: 0 | Status: DISCONTINUED | OUTPATIENT
Start: 2024-11-05 | End: 2024-11-07

## 2024-11-05 RX ORDER — CARBAMIDE PEROXIDE 65 MG/ML
5 LIQUID AURICULAR (OTIC)
Refills: 0 | Status: COMPLETED | OUTPATIENT
Start: 2024-11-05 | End: 2024-11-09

## 2024-11-05 RX ORDER — MELATONIN 5 MG
3 TABLET ORAL AT BEDTIME
Refills: 0 | Status: DISCONTINUED | OUTPATIENT
Start: 2024-11-05 | End: 2024-11-12

## 2024-11-05 RX ORDER — AMLODIPINE BESYLATE 10 MG
10 TABLET ORAL DAILY
Refills: 0 | Status: DISCONTINUED | OUTPATIENT
Start: 2024-11-05 | End: 2024-11-05

## 2024-11-05 RX ORDER — ONDANSETRON HYDROCHLORIDE 2 MG/ML
4 INJECTION, SOLUTION INTRAMUSCULAR; INTRAVENOUS EVERY 8 HOURS
Refills: 0 | Status: DISCONTINUED | OUTPATIENT
Start: 2024-11-05 | End: 2024-11-12

## 2024-11-05 RX ORDER — HYDROMORPHONE HCL/0.9% NACL/PF 6 MG/30 ML
0.5 PATIENT CONTROLLED ANALGESIA SYRINGE INTRAVENOUS ONCE
Refills: 0 | Status: DISCONTINUED | OUTPATIENT
Start: 2024-11-05 | End: 2024-11-05

## 2024-11-05 RX ORDER — HYDROMORPHONE HCL/0.9% NACL/PF 6 MG/30 ML
1 PATIENT CONTROLLED ANALGESIA SYRINGE INTRAVENOUS ONCE
Refills: 0 | Status: DISCONTINUED | OUTPATIENT
Start: 2024-11-05 | End: 2024-11-05

## 2024-11-05 RX ORDER — ACETAMINOPHEN 500 MG
650 TABLET ORAL EVERY 6 HOURS
Refills: 0 | Status: DISCONTINUED | OUTPATIENT
Start: 2024-11-05 | End: 2024-11-12

## 2024-11-05 RX ORDER — CARVEDILOL 25 MG/1
6.25 TABLET, FILM COATED ORAL EVERY 12 HOURS
Refills: 0 | Status: DISCONTINUED | OUTPATIENT
Start: 2024-11-05 | End: 2024-11-12

## 2024-11-05 RX ADMIN — Medication 0.5 MILLIGRAM(S): at 01:32

## 2024-11-05 RX ADMIN — HYDRALAZINE HYDROCHLORIDE 50 MILLIGRAM(S): 50 TABLET, FILM COATED ORAL at 05:30

## 2024-11-05 RX ADMIN — Medication 0.5 MILLIGRAM(S): at 03:36

## 2024-11-05 RX ADMIN — TORSEMIDE 10 MILLIGRAM(S): 100 TABLET ORAL at 07:47

## 2024-11-05 RX ADMIN — Medication 650 MILLIGRAM(S): at 07:57

## 2024-11-05 RX ADMIN — Medication 2.5 MILLIGRAM(S): at 17:22

## 2024-11-05 RX ADMIN — Medication 20 MILLIGRAM(S): at 04:37

## 2024-11-05 RX ADMIN — Medication 0.5 MILLIGRAM(S): at 02:02

## 2024-11-05 RX ADMIN — CARVEDILOL 6.25 MILLIGRAM(S): 25 TABLET, FILM COATED ORAL at 05:30

## 2024-11-05 RX ADMIN — CLOPIDOGREL 75 MILLIGRAM(S): 75 TABLET ORAL at 08:53

## 2024-11-05 RX ADMIN — Medication 81 MILLIGRAM(S): at 06:40

## 2024-11-05 RX ADMIN — ISOSORBIDE MONONITRATE 30 MILLIGRAM(S): 60 TABLET, EXTENDED RELEASE ORAL at 06:40

## 2024-11-05 RX ADMIN — Medication 0.2 MILLIGRAM(S): at 23:34

## 2024-11-05 RX ADMIN — Medication 1 MILLIGRAM(S): at 05:07

## 2024-11-05 RX ADMIN — Medication 1000 MILLIGRAM(S): at 02:02

## 2024-11-05 RX ADMIN — CLONIDINE HYDROCHLORIDE 0.1 MILLIGRAM(S): 0.2 TABLET ORAL at 20:53

## 2024-11-05 RX ADMIN — Medication 650 MILLIGRAM(S): at 22:28

## 2024-11-05 RX ADMIN — Medication 400 MILLIGRAM(S): at 13:37

## 2024-11-05 RX ADMIN — Medication 1 TABLET(S): at 06:39

## 2024-11-05 RX ADMIN — Medication 0.4 MILLIGRAM(S): at 22:28

## 2024-11-05 RX ADMIN — CLONIDINE HYDROCHLORIDE 0.2 MILLIGRAM(S): 0.2 TABLET ORAL at 05:30

## 2024-11-05 RX ADMIN — Medication 0.5 MILLIGRAM(S): at 03:08

## 2024-11-05 RX ADMIN — CARVEDILOL 6.25 MILLIGRAM(S): 25 TABLET, FILM COATED ORAL at 17:22

## 2024-11-05 RX ADMIN — Medication 2.5 MILLIGRAM(S): at 13:37

## 2024-11-05 RX ADMIN — Medication 1 MILLIGRAM(S): at 04:37

## 2024-11-05 RX ADMIN — Medication 40 MILLIGRAM(S): at 22:27

## 2024-11-05 RX ADMIN — LEVETIRACETAM 1000 MILLIGRAM(S): 500 TABLET, FILM COATED ORAL at 05:30

## 2024-11-05 RX ADMIN — Medication 1000 MILLIGRAM(S): at 14:20

## 2024-11-05 RX ADMIN — Medication 400 MILLIGRAM(S): at 01:32

## 2024-11-05 RX ADMIN — Medication 20 MILLIGRAM(S): at 00:51

## 2024-11-05 RX ADMIN — Medication 650 MILLIGRAM(S): at 06:39

## 2024-11-05 RX ADMIN — Medication 0.4 MILLIGRAM(S): at 00:51

## 2024-11-05 RX ADMIN — Medication 0.2 MILLIGRAM(S): at 09:55

## 2024-11-05 RX ADMIN — ONDANSETRON HYDROCHLORIDE 4 MILLIGRAM(S): 2 INJECTION, SOLUTION INTRAMUSCULAR; INTRAVENOUS at 01:32

## 2024-11-05 RX ADMIN — LEVETIRACETAM 1000 MILLIGRAM(S): 500 TABLET, FILM COATED ORAL at 17:22

## 2024-11-05 RX ADMIN — Medication 10 MILLIGRAM(S): at 05:30

## 2024-11-05 RX ADMIN — HYDRALAZINE HYDROCHLORIDE 50 MILLIGRAM(S): 50 TABLET, FILM COATED ORAL at 17:22

## 2024-11-05 RX ADMIN — Medication 0.2 MILLIGRAM(S): at 08:54

## 2024-11-05 NOTE — ED ADULT NURSE REASSESSMENT NOTE - NS ED NURSE REASSESS COMMENT FT1
MD Ryder notified for pt c/o head ache and nausea, decrease in bp and pt still tachy to 130's, pending md assessment

## 2024-11-05 NOTE — ED PROVIDER NOTE - OBJECTIVE STATEMENT
Pt is a 63 yo male with PMH of ESRD TThS, afib, chf, cad s/p cabg presenting with SOB. Pt reports of dry heaving and nausea for the past few days which have stopped him from taking meds or going to his last 1-2 dialysis sessions. Pt denies fever, chest pain, abdominal pain, N/V/D.

## 2024-11-05 NOTE — CHART NOTE - NSCHARTNOTEFT_GEN_A_CORE
Called by RN - patient with a-flutter, rates ~110 - 120.   Resting comfortably in bed, NAD, VS otherwise stable.   Received 2.5 mg IV Metoprolol earlier today, minimal effect.   Additional 2.5 mg IV metoprolol ordered.     Noted in chart hypothermia this AM, now normothermic.   TSH/Cortisol was ordered this AM.   No leukocytosis or evidence of infection.   D/w Hospitalist, will defer ABX and send cultures at this time. Trend temp/WBCs.   RN to continue to monitor, to alert provider w/ any change in patient status.

## 2024-11-05 NOTE — H&P ADULT - ASSESSMENT
65 yo male with PMH of paroxysmal atrial fibrillation/flutter (s/p ablation, s/p watchman), CAD s/p CABG x 2, AVR, MVR 11/2020 with Dr. 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 Pt reports of dry heaving and nausea for the past few days which have stopped him from taking meds or going to his last 1-2 dialysis sessions. Pt denies fever, chest pain, abdominal pain, N/V/D. While in the ED the pt received labetalol 20mg x1, dilaudid 0.5mg x2, nitroglycerin 0.4mg x1, zofran.     #Dyspnea, volume overloaded   #ESRD on HD  -in the setting of missed HD  -BNP >62903.   -CT C/A/P: Chronic aortic dissection again noted involving the thoracic and   abdominal aorta as described above. Mild interval increase in size of the   descending thoracic aorta. Mild diffuse groundglass haziness in the lungs is nonspecific, could   reflect edema or infectious/inflammatory process. Indeterminate enlarged mediastinal and right hilar nodes, increased/new   since prior exam. Recommend clinical correlation and follow-up chest CT   and/or PET CT as warranted.  -Nephro consulted by ED, will f/u recs  -Pt counseled on not missing HD    #Elevated troponin  -likely due to demand ischemia  -Pt denies CP  -Trops downtrended    #Anemia  -Appears at baseline  -No signs of overt GI bleed  -Monitor H/H  -Transfuse to keep Hb>8, if clinically indicated    #Hx of aortic dissection  -Chronic  -CT reviewed  -Outpatient CTS f/u    #Enlarged mediastinal and right hilar nodes  -As evidenced on CT  -Outpatient Pulm appt    #CAD s/p CABG  #Afib s/p ablation, s/p watchman  -Labetalol prn  -Resume     #HCM  -SCDs for DVT PPX    Dispo: home likely in 2-3 days     65 yo male with PMH of paroxysmal atrial fibrillation/flutter (s/p ablation, s/p watchman), BPH, seizure disorder, HTN, CAD s/p CABG x 2, AVR, MVR 11/2020 with Dr. 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 Pt reports of dry heaving and nausea for the past few days which have stopped him from taking meds or going to his last 1-2 dialysis sessions. Pt denies fever, chest pain, abdominal pain, N/V/D. While in the ED the pt received labetalol 20mg x1, dilaudid 0.5mg x2, nitroglycerin 0.4mg x1, zofran.     #Dyspnea, volume overloaded   #ESRD on HD  -in the setting of missed HD  -BNP >11587.   -CT C/A/P: Chronic aortic dissection again noted involving the thoracic and   abdominal aorta as described above. Mild interval increase in size of the   descending thoracic aorta. Mild diffuse groundglass haziness in the lungs is nonspecific, could   reflect edema or infectious/inflammatory process. Indeterminate enlarged mediastinal and right hilar nodes, increased/new   since prior exam. Recommend clinical correlation and follow-up chest CT   and/or PET CT as warranted.  -Nephro consulted by ED, will f/u recs  -Pt counseled on not missing HD    #Elevated troponin  -likely due to demand ischemia  -Pt denies CP  -Trops downtrended    #Anemia  -Appears at baseline  -No signs of overt GI bleed  -Monitor H/H  -Transfuse to keep Hb>8, if clinically indicated    #Hx of aortic dissection  -Chronic  -CT reviewed  -Outpatient CTS f/u    #Enlarged mediastinal and right hilar nodes  -As evidenced on CT  -Outpatient Pulm appt    #HTN  #CAD s/p CABG  #Afib s/p ablation, s/p watchman  -Labetalol prn  -DAPT  -Norvasc 10mg daily, Coreg 6.25mg q12, Clonidine 0.2mg bid, Hydralazine 50mg bid, imdur 30mg daily, torsemide 10mg every other day    #Seizure disorder  -Keppra 1g bid    #BPH  -Flomax 0.4mg qhs    #HCM  -SCDs for DVT PPX    Dispo: home likely in 2-3 days     63 yo male with PMH of paroxysmal atrial fibrillation/flutter (s/p ablation, s/p watchman), BPH, seizure disorder, HTN, CAD s/p CABG x 2, AVR, MVR 11/2020 with Dr. 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 Associated w/ N/V (NBNB, 2 episodes, last episode earlier today). Pt denies CP, diarrhea, fever, chills, cough. Rest of ROS (-). Pt says he missed his last 2 HD sessions and has not been taking PO meds as he didn't feel good. While in the ED the pt received labetalol 20mg x2, dilaudid 0.5mg x2, nitroglycerin 0.4mg x1, zofran.     #Dyspnea, volume overloaded   #ESRD on HD  -in the setting of missed HD  -BNP >57786.   -CT C/A/P: Chronic aortic dissection again noted involving the thoracic and abdominal aorta as described above. Mild interval increase in size of the descending thoracic aorta. Mild diffuse groundglass haziness in the lungs is nonspecific, could   reflect edema or infectious/inflammatory process. Indeterminate enlarged mediastinal and right hilar nodes, increased/new since prior exam. Recommend clinical correlation and follow-up chest CT and/or PET CT as warranted.  -Renal diet  -Nephro consulted by ED, will f/u recs  -Pt counseled on not missing HD    #Elevated troponin  -likely due to demand ischemia  -Pt denies CP  -Trops downtrended    #Anemia  -Appears at baseline  -No signs of overt GI bleed  -Monitor H/H  -Transfuse to keep Hb>8, if clinically indicated    #Hypertensive urgency due to medication noncompliance  #CAD s/p CABG  #Afib s/p ablation, s/p watchman  -Labetalol prn  -DAPT  -Norvasc 10mg daily, Coreg 6.25mg q12, Clonidine 0.2mg bid, Hydralazine 50mg bid, imdur 30mg daily, torsemide 10mg every other day  -Pt counseled on medication compliance    #Hx of aortic dissection  -Chronic  -CT reviewed  -Outpatient CTS f/u    #Enlarged mediastinal and right hilar nodes  -As evidenced on CT  -Outpatient Pulm appt, will need f/u CT chest or PET CT     #Seizure disorder  -Keppra 1g bid    #BPH  -Flomax 0.4mg qhs    #HCM  -SCDs for DVT PPX    Dispo: home likely in 2-3 days     63 yo male with PMH of paroxysmal atrial fibrillation/flutter (s/p ablation, s/p watchman), BPH, seizure disorder, HTN, CAD s/p CABG x 2, AVR, MVR 11/2020 with Dr. 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 Associated w/ N/V (NBNB, 2 episodes, last episode earlier today). Pt denies CP, diarrhea, fever, chills, cough. Rest of ROS (-). Pt says he missed his last 2 HD sessions and has not been taking PO meds as he didn't feel good. While in the ED the pt received labetalol 20mg x2, dilaudid 0.5mg x2, nitroglycerin 0.4mg x1, zofran.     #Dyspnea, volume overloaded   #ESRD on HD  #Uremia  -in the setting of missed HD  -BNP >35830.   -CT C/A/P: Chronic aortic dissection again noted involving the thoracic and abdominal aorta as described above. Mild interval increase in size of the descending thoracic aorta. Mild diffuse groundglass haziness in the lungs is nonspecific, could   reflect edema or infectious/inflammatory process. Indeterminate enlarged mediastinal and right hilar nodes, increased/new since prior exam. Recommend clinical correlation and follow-up chest CT and/or PET CT as warranted.  -Renal diet  -Zofran prn  -Nephro consulted by ED, will f/u recs  -Pt counseled on not missing HD    #Elevated troponin  -likely due to demand ischemia  -Pt denies CP  -Trops downtrended    #Anemia  -Appears at baseline  -No signs of overt GI bleed  -Monitor H/H  -Transfuse to keep Hb>8, if clinically indicated    #Hypertensive urgency due to medication noncompliance  #CAD s/p CABG  #Afib s/p ablation, s/p watchman  -Labetalol prn  -DAPT  -Norvasc 10mg daily, Coreg 6.25mg q12, Clonidine 0.2mg bid, Hydralazine 50mg bid, imdur 30mg daily, torsemide 10mg every other day  -Pt counseled on medication compliance    #Hx of aortic dissection  -Chronic  -CT reviewed  -Outpatient CTS f/u    #Enlarged mediastinal and right hilar nodes  -As evidenced on CT  -Outpatient Pulm appt, will need f/u CT chest or PET CT     #Seizure disorder  -Keppra 1g bid    #BPH  -Flomax 0.4mg qhs    #HCM  -SCDs for DVT PPX    Dispo: home likely in 2-3 days     63 yo male with PMH of paroxysmal atrial fibrillation/flutter (s/p ablation, s/p watchman), BPH, seizure disorder, HTN, HLD, CAD s/p CABG x 2, AVR, MVR 11/2020 with Dr. 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 Associated w/ N/V (NBNB, 2 episodes, last episode earlier today). Pt denies CP, diarrhea, fever, chills, cough. Rest of ROS (-). Pt says he missed his last 2 HD sessions and has not been taking PO meds as he didn't feel good. While in the ED the pt received labetalol 20mg x2, dilaudid 0.5mg x2, nitroglycerin 0.4mg x1, zofran.     #Dyspnea, volume overloaded   #ESRD on HD  #Uremia  -in the setting of missed HD  -BNP >76498.   -CT C/A/P: Chronic aortic dissection again noted involving the thoracic and abdominal aorta as described above. Mild interval increase in size of the descending thoracic aorta. Mild diffuse groundglass haziness in the lungs is nonspecific, could   reflect edema or infectious/inflammatory process. Indeterminate enlarged mediastinal and right hilar nodes, increased/new since prior exam. Recommend clinical correlation and follow-up chest CT and/or PET CT as warranted.  -Renal diet  -Zofran prn  -Nephro consulted by ED, will f/u recs  -Pt counseled on not missing HD    #Elevated troponin  -likely due to demand ischemia  -Pt denies CP  -Trops downtrended    #Anemia  -Appears at baseline  -No signs of overt GI bleed  -Monitor H/H  -Transfuse to keep Hb>8, if clinically indicated    #Hypertensive urgency due to medication noncompliance  #CAD s/p CABG  #Afib s/p ablation, s/p watchman  -Labetalol prn  -DAPT  -Norvasc 10mg daily, Coreg 6.25mg q12, Clonidine 0.2mg bid, Hydralazine 50mg bid, imdur 30mg daily, torsemide 10mg every other day  -Pt counseled on medication compliance    #Hx of aortic dissection  -Chronic  -CT reviewed  -Outpatient CTS f/u    #Enlarged mediastinal and right hilar nodes  -As evidenced on CT  -Outpatient Pulm appt, will need f/u CT chest or PET CT     #Seizure disorder  -Keppra 1g bid    #HLD  -Lipitor 40mg qhs    #BPH  -Flomax 0.4mg qhs    #HCM  -SCDs for DVT PPX    Dispo: home likely in 2-3 days

## 2024-11-05 NOTE — PATIENT PROFILE ADULT - FALL HARM RISK - HARM RISK INTERVENTIONS

## 2024-11-05 NOTE — H&P ADULT - HISTORY OF PRESENT ILLNESS
63 y/o M w/ PMH of paroxysmal atrial fibrillation/flutter (s/p ablation, s/p watchman), CAD s/p CABG x 2, AVR, MVR 11/2020 with Dr. 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 Pt reports of dry heaving and nausea for the past few days which have stopped him from taking meds or going to his last 1-2 dialysis sessions. Pt denies fever, chest pain, abdominal pain, N/V/D. While in the ED the pt received labetalol 20mg x1, dilaudid 0.5mg x2, nitroglycerin 0.4mg x1, zofran.    65 yo male with PMH of paroxysmal atrial fibrillation/flutter (s/p ablation, s/p watchman), BPH, seizure disorder, HTN, CAD s/p CABG x 2, AVR, MVR 11/2020 with Dr. 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 Pt reports of dry heaving and nausea for the past few days which have stopped him from taking meds or going to his last 1-2 dialysis sessions. Pt denies fever, chest pain, abdominal pain, N/V/D. While in the ED the pt received labetalol 20mg x1, dilaudid 0.5mg x2, nitroglycerin 0.4mg x1, zofran.  65 yo male with PMH of paroxysmal atrial fibrillation/flutter (s/p ablation, s/p watchman), BPH, seizure disorder, HTN, CAD s/p CABG x 2, AVR, MVR 11/2020 with Dr. 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 Associated w/ N/V (NBNB, 2 episodes, last episode earlier today). Pt denies CP, diarrhea, fever, chills, cough. Rest of ROS (-). Pt says he missed his last 2 HD sessions and has not been taking PO meds as he didn't feel good. While in the ED the pt received labetalol 20mg x2, dilaudid 0.5mg x2, nitroglycerin 0.4mg x1, zofran.  63 yo male with PMH of paroxysmal atrial fibrillation/flutter (s/p ablation, s/p watchman), BPH, seizure disorder, HTN, HLD, CAD s/p CABG x 2, AVR, MVR 11/2020 with Dr. 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 Associated w/ N/V (NBNB, 2 episodes, last episode earlier today). Pt denies CP, diarrhea, fever, chills, cough. Rest of ROS (-). Pt says he missed his last 2 HD sessions and has not been taking PO meds as he didn't feel good. While in the ED the pt received labetalol 20mg x2, dilaudid 0.5mg x2, nitroglycerin 0.4mg x1, zofran.

## 2024-11-05 NOTE — CONSULT NOTE ADULT - SUBJECTIVE AND OBJECTIVE BOX
HPI: The pt is a 63 yo male PMH +ESRD (TTS) + HTN + CAD/CABG/AVR/MVR/PAF s/p ablation and Watchman + Type A dissection complicated by bowel ischemia requiring bowel resection +SAH who presented to the ED with SOB and N/V.  Pt has missed his last several dialysis sessions. When I saw the patient he was more comfortable, no acute sob, cp, n/v sice admission.      PAST MEDICAL & SURGICAL HISTORY:  CHF (congestive heart failure)      CVA (cerebral vascular accident)      Seizures  last seizure 10 years ago      HTN (hypertension)      Hyperlipemia      COVID-19  october 2020      Atrial fibrillation      Former smoker      History of cocaine use  remote hx, currently sober      H/O aortic dissection  s/p repair (2010), surgery complicated by bowel ischemia s/p bowel resection and ostomy with reversal      H/O aortic valve insufficiency      Mitral regurgitation      GIB (gastrointestinal bleeding)      CAD (coronary artery disease)  s/p PCI 1998  and CABG x 2 11/2020      Chronic atrial fibrillation      Aneurysm of artery of upper extremity      Anemia of chronic disease      End stage renal disease      Myocardial infarction      COVID-19 vaccine series completed      Port-A-Cath in place      H/O colectomy      Status post double vessel coronary artery bypass  11/2020 Dr Butler      S/P AVR (aortic valve replacement)  (t)AVR 11/2020 Dr Butler      S/P MVR (mitral valve replacement)  (t)MVR 11/2020 Dr Butler      H/O aortic dissection  Type A; emergent repair 2010      AV fistula  LUE      History of renal transplant      Presence of Watchman left atrial appendage closure device          FAMILY HISTORY:  FH: hypertension    Family history of cardiac disorder (Mother)  mother    Social History:  No current tobacco    MEDICATIONS  (STANDING):  amLODIPine   Tablet 10 milliGRAM(s) Oral daily  aspirin enteric coated 81 milliGRAM(s) Oral daily  atorvastatin 40 milliGRAM(s) Oral at bedtime  carvedilol 6.25 milliGRAM(s) Oral every 12 hours  chlorhexidine 2% Cloths 1 Application(s) Topical <User Schedule>  cloNIDine 0.2 milliGRAM(s) Oral two times a day  clopidogrel Tablet 75 milliGRAM(s) Oral daily  hydrALAZINE 50 milliGRAM(s) Oral two times a day  isosorbide   mononitrate ER Tablet (IMDUR) 30 milliGRAM(s) Oral daily  levETIRAcetam 1000 milliGRAM(s) Oral two times a day  multivitamin 1 Tablet(s) Oral daily  tamsulosin 0.4 milliGRAM(s) Oral at bedtime  torsemide 10 milliGRAM(s) Oral <User Schedule>    MEDICATIONS  (PRN):  acetaminophen     Tablet .. 650 milliGRAM(s) Oral every 6 hours PRN Temp greater or equal to 38C (100.4F), Mild Pain (1 - 3)  aluminum hydroxide/magnesium hydroxide/simethicone Suspension 30 milliLiter(s) Oral every 4 hours PRN Dyspepsia  melatonin 3 milliGRAM(s) Oral at bedtime PRN Insomnia  ondansetron Injectable 4 milliGRAM(s) IV Push every 8 hours PRN Nausea and/or Vomiting      Allergies    penicillin (Other)    Intolerances        REVIEW OF SYSTEMS:  CONSTITUTIONAL: No fever, weight loss, + fatigue  EYES: No eye pain, visual disturbances, or discharge  ENMT:  No difficulty hearing, tinnitus, vertigo; No sinus or throat pain  NECK: No pain or stiffness  BREASTS: No pain, masses, or nipple discharge  RESPIRATORY: No cough, wheezing, chills or hemoptysis; + shortness of breath  CARDIOVASCULAR: No chest pain, palpitations, dizziness, or leg swelling  GASTROINTESTINAL: No abdominal or epigastric pain. + nausea, vomiting; no hematemesis; No diarrhea or constipation. No melena or hematochezia.  GENITOURINARY: No dysuria, frequency, hematuria, or incontinence  NEUROLOGICAL: No headaches, memory loss, loss of strength, numbness, or tremors  SKIN: No itching, burning, rashes, or lesions   MUSCULOSKELETAL: No joint pain or swelling; No muscle, back, or extremity pain    Vital Signs Last 24 Hrs  T(C): 36.4 (05 Nov 2024 08:43), Max: 36.9 (05 Nov 2024 06:42)  T(F): 97.5 (05 Nov 2024 08:43), Max: 98.5 (05 Nov 2024 06:42)  HR: 124 (05 Nov 2024 08:43) (120 - 137)  BP: 121/76 (05 Nov 2024 08:43) (121/76 - 226/106)  BP(mean): 91 (05 Nov 2024 08:43) (91 - 115)  RR: 20 (05 Nov 2024 08:43) (18 - 28)  SpO2: 94% (05 Nov 2024 08:43) (94% - 100%)    Parameters below as of 05 Nov 2024 08:43  Patient On (Oxygen Delivery Method): nasal cannula  O2 Flow (L/min): 2      PHYSICAL EXAM:  GENERAL: Appears chronically ill  HEAD: Atraumatic, Normocephalic  EYES: Conjunctiva and sclera clear  ENMT:  Moist mucous membranes  NECK: Supple, No JVD  NERVOUS SYSTEM:  Alert & Oriented X3, intact and symmetric  CHEST/LUNG: Clear, diminished BS  HEART: No rub  ABDOMEN: Soft, Nontender, +BS  EXTREMITIES:  2+ Peripheral Pulses, + dependent edema  SKIN: No rash      LABS:                        7.9    7.86  )-----------( 68       ( 05 Nov 2024 05:39 )             25.4     11-05    146[H]  |  105  |  106.5[H]  ----------------------------<  132[H]  5.1   |  23.0  |  8.85[H]    Ca    8.9      05 Nov 2024 05:39    TPro  7.5  /  Alb  4.6  /  TBili  2.1[H]  /  DBili  x   /  AST  38  /  ALT  18  /  AlkPhos  68  11-05    PT/INR - ( 05 Nov 2024 00:37 )   PT: 15.0 sec;   INR: 1.30 ratio         PTT - ( 05 Nov 2024 00:37 )  PTT:24.3 sec  Urinalysis Basic - ( 05 Nov 2024 05:39 )    Color: x / Appearance: x / SG: x / pH: x  Gluc: 132 mg/dL / Ketone: x  / Bili: x / Urobili: x   Blood: x / Protein: x / Nitrite: x   Leuk Esterase: x / RBC: x / WBC x   Sq Epi: x / Non Sq Epi: x / Bacteria: x          RADIOLOGY & ADDITIONAL TESTS:  < from: CT Angio Abdomen and Pelvis w/ IV Cont (11.05.24 @ 02:53) >    ACC: [id number] EXAM:  CT ANGIO ABD PELV (W)AW IC   ORDERED BY: ANASTACIA AVILEZ     ACC: [id number] EXAM:  CT ANGIO CHEST AORTA WAWIC   ORDERED BY: ANASTACIA AVILEZ     *** ADDENDUM # 1 ***    Known right renal lesion previously described as suspicious for low-grade   neoplasm on 10/4/2023 CT is not well evaluated on this arterial phase   exam.    --- End of Report ---    *** END OF ADDENDUM # 1 ***      PROCEDURE DATE:  11/05/2024          INTERPRETATION:  CLINICAL INFORMATION: Aortic dissection. Shortness of   breath, missed hemodialysis.    COMPARISON: 10/4/2023 abdominal CTA, 5/22/2023 chest CT    CONTRAST/COMPLICATIONS:  IV Contrast: IV contrast documented in unlinked concurrent exam   (accession [id number]), Omnipaque 350 (accession [id number])90 cc   administered   10 cc discarded  Oral Contrast: NONE  Complications: None reported at time of study completion    PROCEDURE:  CT Angiography of the Chest, Abdomen and Pelvis.  Precontrast imaging was performed through the chest followed by arterial   phase imaging of the chest, abdomen and pelvis.  Sagittal and coronal reformats were performed as well as 3D (MIP)   reconstructions.    FINDINGS:    Aorta/vessels: Aortic and coronary atherosclerotic calcifications. Status   post aortic root repair and mitral valve repair. Left atrial appendage   occlusion device. No intramural hematoma. Chronic aortic dissection again   noted extending from the aortic arch through the abdominal aorta into the   right external iliac artery. Dissection flap again noted extending into   the right brachiocephalic artery, left carotid artery and bilateral renal   arteries. True lumen fills with contrast throughout the chest and   abdomen. False lumen partially fills with contrast. Grossly stable   dilatation of the ascending aorta. Increased size of the descending   thoracic aorta, now measuring up to 4.1 cm, previously 3.9 cm. Stable   aneurysmal dilatation of the right common iliac artery.    CHEST:  LUNGS AND LARGE AIRWAYS: Trace layering secretions in the superior   trachea. Diffuse patchy bilateral ground glass haziness. Mild bibasilar   atelectasis.  PLEURA: No pleural effusion.  HEART: Heart size is normal. No pericardial effusion.  MEDIASTINUM AND KAYLA: Enlarged mediastinal lymph nodes including 1.5 cm   precarinal node and 2.4 cm subcarinal node. Enlarged right hilar nodes.   CHEST WALL AND LOWER NECK: Median sternotomy. Right chest wall catheter   with tip in the SVC.    ABDOMEN AND PELVIS:  LIVER: Within normal limits.  BILE DUCTS: Normal caliber.  GALLBLADDER: Cholelithiasis. Distended gallbladder.  SPLEEN: Within normal limits.  PANCREAS: Within normal limits.  ADRENALS: Within normal limits.  KIDNEYS/URETERS: Bilateral renal cysts. No hydronephrosis.    BLADDER: Within normallimits.  REPRODUCTIVE ORGANS: Enlarged prostate.    BOWEL: No bowel obstruction. Status post partial colectomy.  PERITONEUM/RETROPERITONEUM: Within normal limits.  VESSELS: Within normal limits.  LYMPH NODES: No lymphadenopathy.  ABDOMINAL WALL: Within normal limits.  BONES: Degenerative changes.    IMPRESSION:  Chronic aortic dissection again noted involving the thoracic and   abdominal aorta as described above. Mild interval increase in size of the   descending thoracic aorta.    Mild diffuse groundglass haziness in the lungs is nonspecific, could   reflect edema or infectious/inflammatory process.    Indeterminate enlarged mediastinal and right hilar nodes, increased/new   since prior exam. Recommend clinical correlation and follow-up chest CT   and/or PET CT as warranted.    < end of copied text >

## 2024-11-05 NOTE — ED PROVIDER NOTE - CLINICAL SUMMARY MEDICAL DECISION MAKING FREE TEXT BOX
Pt is a 63 yo male with PMH of ESRD TThS, afib, chf, cad s/p cabg presenting with SOB. Pt reports of dry heaving and nausea for the past few days which have stopped him from taking meds or going to his last 1-2 dialysis sessions. Pt denies fever, chest pain, abdominal pain, N/V/D.    vs shows hypertension, tachycardia to 140s, given labetalol 20mg.    Pt complains of nausea and headache, given ofirmev, dilaudid and zofran.    acs workup, will need to be admitted for urgent HD.

## 2024-11-05 NOTE — ED ADULT NURSE NOTE - OBJECTIVE STATEMENT
Pt. c/o SOBNADIA, states he is unsure of when his last session of HD is, usually goes Tuesday/Thursday/Saturday. Pt. anxious on exam, hypertensive. Pt. denies chest pain.

## 2024-11-05 NOTE — ED PROVIDER NOTE - ATTENDING CONTRIBUTION TO CARE
I have personally provided _35__ minutes of critical care time exclusive of time spent on separately billable procedures. Time includes review of laboratory data, radiology results, discussion with consultants, and monitoring for potential decompensation. Interventions were performed as documented above     I performed a face to face bedside interview with patient regarding history of present illness, review of symptoms and past medical history. I completed an independent physical exam.  I have discussed patient's plan of care with resident.   I agree with note as stated above including HISTORY OF PRESENT ILLNESS, HIV, PAST MEDICAL/SURGICAL/FAMILY/SOCIAL HISTORY, ALLERGIES AND HOME MEDICATIONS, REVIEW OF SYSTEMS, PHYSICAL EXAM, MEDICAL DECISION MAKING and any PROGRESS NOTES during the time I functioned as the attending physician for this patient unless otherwise noted. My brief assessment is as follows:     General: NAD  HEENT: Normocephalic, atraumatic  Neck: No apparent stiffness or JVD  Pulm: Chest wall symmetric and nontender, lungs clear to ascultation   Cardiac: Regular rate and regular rhythm  Abdomen: Abdominal midline scar, nontender, nondistended  Skin: Skin is warm, dry and intact without rashes or lesions.  Neuro: No motor or sensory deficits above reported baseline  MSK: No deformity or tenderness above reported baseline

## 2024-11-05 NOTE — ED ADULT NURSE NOTE - PRO INTERPRETER NEED 2
English
This was a shared visit with the KELLEN. I reviewed and verified the documentation and independently performed the documented:

## 2024-11-05 NOTE — ED PROVIDER NOTE - PHYSICAL EXAMINATION
General: NAD  HEENT: Normocephalic, atraumatic  Neck: No apparent stiffness or JVD  Pulm: Chest wall symmetric and nontender, lungs clear to ascultation   Cardiac: Regular rate and regular rhythm  Abdomen: Abdominal midline scar, nontender, nondistended  Skin: Skin is warm, dry and intact without rashes or lesions.  Neuro: No motor or sensory deficits above reported baseline  MSK: No deformity or tenderness above reported baseline

## 2024-11-05 NOTE — H&P ADULT - NSHPPHYSICALEXAM_GEN_ALL_CORE
General: Age-appearing, in no acute distress  Head: Normocephalic, atraumatic  ENMT: EOMI, no scleral icterus, neck supple, no JVD  Cardiovascular: +S1, S2; Regular rate and rhythm, no murmurs.  Respiratory: CTAB, no wheezing, no rales, no rhonchi  Gastrointestinal: soft, ND, NT, (+) BS, (-) rebound  Neuro: AAOx3, CN2-12 intact, no FND  Musculoskeletal: Normal tone, no deformities  Skin: Warm, dry, no rash, no jaundice  Psych: Calm, cooperative General: Age-appearing, in no acute distress  Head: Normocephalic, atraumatic  ENMT: EOMI, no scleral icterus, neck supple, no JVD  Cardiovascular: +S1, S2; Regular rate and rhythm, no murmurs, right HD catheter in place  Respiratory: decreased breath sounds at b/l bases  Gastrointestinal: soft, ND, NT, (+) BS, (-) rebound  Neuro: AAOx3, CN2-12 intact, no FND  Musculoskeletal: Normal tone, no deformities, LUE AVF  Skin: Warm, dry, no rash, no jaundice  Psych: Calm, cooperative

## 2024-11-05 NOTE — ED PROVIDER NOTE - NSICDXPASTSURGICALHX_GEN_ALL_CORE_FT
PAST SURGICAL HISTORY:  AV fistula LUE    H/O aortic dissection Type A; emergent repair 2010    H/O colectomy     History of renal transplant     Presence of Watchman left atrial appendage closure device     S/P AVR (aortic valve replacement) (t)AVR 11/2020 Dr Butler    S/P MVR (mitral valve replacement) (t)MVR 11/2020 Dr Butler    Status post double vessel coronary artery bypass 11/2020 Dr Butler

## 2024-11-05 NOTE — ED PROVIDER NOTE - NSICDXPASTMEDICALHX_GEN_ALL_CORE_FT
PAST MEDICAL HISTORY:  Anemia of chronic disease     Aneurysm of artery of upper extremity     Atrial fibrillation     CAD (coronary artery disease) s/p PCI 1998  and CABG x 2 11/2020    CHF (congestive heart failure)     Chronic atrial fibrillation     COVID-19 october 2020    COVID-19 vaccine series completed     CVA (cerebral vascular accident)     End stage renal disease     Former smoker     GIB (gastrointestinal bleeding)     H/O aortic dissection s/p repair (2010), surgery complicated by bowel ischemia s/p bowel resection and ostomy with reversal    H/O aortic valve insufficiency     History of cocaine use remote hx, currently sober    HTN (hypertension)     Hyperlipemia     Mitral regurgitation     Myocardial infarction     Port-A-Cath in place     Seizures last seizure 10 years ago

## 2024-11-05 NOTE — CHART NOTE - NSCHARTNOTEFT_GEN_A_CORE
Patient is a 64y old  Male admitted for missed HD     he is seen in am , with hypothermia on warming blanket   he is c/o headache this am           ALLERGIES:  penicillin (Other)    MEDICATIONS  (STANDING):  amLODIPine   Tablet 10 milliGRAM(s) Oral daily  aspirin enteric coated 81 milliGRAM(s) Oral daily  atorvastatin 40 milliGRAM(s) Oral at bedtime  carvedilol 6.25 milliGRAM(s) Oral every 12 hours  chlorhexidine 2% Cloths 1 Application(s) Topical <User Schedule>  cloNIDine 0.2 milliGRAM(s) Oral two times a day  clopidogrel Tablet 75 milliGRAM(s) Oral daily  epoetin yolanda-epbx (RETACRIT) Injectable 95775 Unit(s) IV Push <User Schedule>  hydrALAZINE 50 milliGRAM(s) Oral two times a day  isosorbide   mononitrate ER Tablet (IMDUR) 30 milliGRAM(s) Oral daily  levETIRAcetam 1000 milliGRAM(s) Oral two times a day  multivitamin 1 Tablet(s) Oral daily  tamsulosin 0.4 milliGRAM(s) Oral at bedtime  torsemide 10 milliGRAM(s) Oral <User Schedule>    MEDICATIONS  (PRN):  acetaminophen     Tablet .. 650 milliGRAM(s) Oral every 6 hours PRN Temp greater or equal to 38C (100.4F), Mild Pain (1 - 3)  aluminum hydroxide/magnesium hydroxide/simethicone Suspension 30 milliLiter(s) Oral every 4 hours PRN Dyspepsia  melatonin 3 milliGRAM(s) Oral at bedtime PRN Insomnia  ondansetron Injectable 4 milliGRAM(s) IV Push every 8 hours PRN Nausea and/or Vomiting    Vital Signs Last 24 Hrs  T(F): 97.4 (05 Nov 2024 11:24), Max: 98.5 (05 Nov 2024 06:42)  HR: 126 (05 Nov 2024 11:24) (120 - 137)  BP: 128/79 (05 Nov 2024 11:24) (121/76 - 226/106)  RR: 19 (05 Nov 2024 11:24) (18 - 28)  SpO2: 100% (05 Nov 2024 11:24) (94% - 100%)  I&O's Summary      PHYSICAL EXAM:  General:   ENT:   Neck:   Lungs:   Cardio: RRR, S1/S2, No murmur  Abdomen: Soft, Nontender, Nondistended; Bowel sounds present  Extremities: No calf tenderness, No pitting edema    LABS:                        7.9    7.86  )-----------( 68       ( 05 Nov 2024 05:39 )             25.4     11-05    146  |  105  |  106.5  ----------------------------<  132  5.1   |  23.0  |  8.85    Ca    8.9      05 Nov 2024 05:39    TPro  7.5  /  Alb  4.6  /  TBili  2.1  /  DBili  x   /  AST  38  /  ALT  18  /  AlkPhos  68  11-05          PT/INR - ( 05 Nov 2024 00:37 )   PT: 15.0 sec;   INR: 1.30 ratio         PTT - ( 05 Nov 2024 00:37 )  PTT:24.3 sec                            Urinalysis Basic - ( 05 Nov 2024 05:39 )    Color: x / Appearance: x / SG: x / pH: x  Gluc: 132 mg/dL / Ketone: x  / Bili: x / Urobili: x   Blood: x / Protein: x / Nitrite: x   Leuk Esterase: x / RBC: x / WBC x   Sq Epi: x / Non Sq Epi: x / Bacteria: x          RADIOLOGY & ADDITIONAL TESTS: Patient is a 64y old  Male admitted for missed HD     he is seen in am , with hypothermia on warming blanket   he is c/o headache this am           ALLERGIES:  penicillin (Other)    MEDICATIONS  (STANDING):  amLODIPine   Tablet 10 milliGRAM(s) Oral daily  aspirin enteric coated 81 milliGRAM(s) Oral daily  atorvastatin 40 milliGRAM(s) Oral at bedtime  carvedilol 6.25 milliGRAM(s) Oral every 12 hours  chlorhexidine 2% Cloths 1 Application(s) Topical <User Schedule>  cloNIDine 0.2 milliGRAM(s) Oral two times a day  clopidogrel Tablet 75 milliGRAM(s) Oral daily  epoetin yolanda-epbx (RETACRIT) Injectable 49681 Unit(s) IV Push <User Schedule>  hydrALAZINE 50 milliGRAM(s) Oral two times a day  isosorbide   mononitrate ER Tablet (IMDUR) 30 milliGRAM(s) Oral daily  levETIRAcetam 1000 milliGRAM(s) Oral two times a day  multivitamin 1 Tablet(s) Oral daily  tamsulosin 0.4 milliGRAM(s) Oral at bedtime  torsemide 10 milliGRAM(s) Oral <User Schedule>    MEDICATIONS  (PRN):  acetaminophen     Tablet .. 650 milliGRAM(s) Oral every 6 hours PRN Temp greater or equal to 38C (100.4F), Mild Pain (1 - 3)  aluminum hydroxide/magnesium hydroxide/simethicone Suspension 30 milliLiter(s) Oral every 4 hours PRN Dyspepsia  melatonin 3 milliGRAM(s) Oral at bedtime PRN Insomnia  ondansetron Injectable 4 milliGRAM(s) IV Push every 8 hours PRN Nausea and/or Vomiting    Vital Signs Last 24 Hrs  T(F): 97.4 (05 Nov 2024 11:24), Max: 98.5 (05 Nov 2024 06:42)  HR: 126 (05 Nov 2024 11:24) (120 - 137)  BP: 128/79 (05 Nov 2024 11:24) (121/76 - 226/106)  RR: 19 (05 Nov 2024 11:24) (18 - 28)  SpO2: 100% (05 Nov 2024 11:24) (94% - 100%)  I&O's Summary      PHYSICAL EXAM:  General: awake c/o HA   Lungs: CTA   Cardio: RRR, S1/S2, No murmur  Abdomen: Soft, Nontender, Nondistended; Bowel sounds present  Extremities: No calf tenderness, No pitting edema    LABS:                        7.9    7.86  )-----------( 68       ( 05 Nov 2024 05:39 )             25.4     11-05    146  |  105  |  106.5  ----------------------------<  132  5.1   |  23.0  |  8.85    Ca    8.9      05 Nov 2024 05:39    TPro  7.5  /  Alb  4.6  /  TBili  2.1  /  DBili  x   /  AST  38  /  ALT  18  /  AlkPhos  68  11-05          PT/INR - ( 05 Nov 2024 00:37 )   PT: 15.0 sec;   INR: 1.30 ratio         PTT - ( 05 Nov 2024 00:37 )  PTT:24.3 sec                            Urinalysis Basic - ( 05 Nov 2024 05:39 )    Color: x / Appearance: x / SG: x / pH: x  Gluc: 132 mg/dL / Ketone: x  / Bili: x / Urobili: x   Blood: x / Protein: x / Nitrite: x   Leuk Esterase: x / RBC: x / WBC x   Sq Epi: x / Non Sq Epi: x / Bacteria: x          RADIOLOGY & ADDITIONAL TESTS:    c< from: CT Angio Chest Aorta w/wo IV Cont (11.05.24 @ 02:53) >    IMPRESSION:  Chronic aortic dissection again noted involving the thoracic and   abdominal aorta as described above. Mild interval increase in size of the   descending thoracic aorta.    Mild diffuse groundglass haziness in the lungs is nonspecific, could   reflect edema or infectious/inflammatory process.    Indeterminate enlarged mediastinal and right hilar nodes, increased/new   since prior exam. Recommend clinical correlation and follow-up chest CT   and/or PET CT as warranted.        < end of copied text >    < from: CT Angio Chest Aorta w/wo IV Cont (11.05.24 @ 02:53) >      Known right renal lesion previously described as suspicious for low-grade   neoplasm on 10/4/2023 CT is not well evaluated on this arterial phase   exam.    Assesment / Plan     63 yo male with PMH of paroxysmal atrial fibrillation/flutter (s/p ablation, s/p watchman), BPH, seizure disorder, HTN, HLD, CAD s/p CABG x 2, AVR, MVR 11/2020 with Dr. 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 Associated w/ N/V (NBNB, 2 episodes, last episode earlier today). Pt denies CP, diarrhea, fever, chills, cough. Rest of ROS (-). Pt says he missed his last 2 HD sessions and has not been taking PO meds as he didn't feel good. While in the ED the pt received labetalol 20mg x2, dilaudid 0.5mg x2, nitroglycerin 0.4mg x1, zofran.     1-Dyspnea due to missed HD , anemia   2-ESRD on HD  HD today   nephrology consult appreciated   -CT C/A/P: Chronic aortic dissection again noted involving the thoracic and abdominal aorta as described above. Mild interval increase in size of the descending thoracic aorta. Mild diffuse groundglass haziness in the lungs is nonspecific, could reflect edema or infectious/inflammatory process. Indeterminate enlarged mediastinal and right hilar nodes, increased/new since prior exam. Recommend clinical correlation and follow-up chest CT and/or PET CT as warranted.  -Renal diet    2-Elevated troponin  -likely due to demand ischemia  -Pt denies CP  -Trops monitoring   -TTE result in the chart from sep 2024 normal EF , Pulmonary HTN   cnt asa     3-Anemia  will tx 1 unit with HD tomorrow if repeat HB < 8  No signs of overt GI bleed    4-Hypertensive urgency due to medication noncompliance  5-CAD s/p CABG  6- h/o Afib s/p ablation, s/p watchman   HR is high add iv metoprolol as needed  , IF HR remains uncontrolled will call cardiology / EP   -DAPT  -Norvasc 10mg daily, Coreg 6.25mg q12, Clonidine 0.2mg bid, Hydralazine 50mg bid, imdur 30mg daily, torsemide 10mg every other day    7-Hx of aortic dissection  -Chronic  -CT reviewed  -Outpatient CTS f/u    8-Enlarged mediastinal and right hilar nodes  -As evidenced on CT  -Outpatient Pulm appt, will need f/u CT chest or PET CT     9-Seizure disorder  -Keppra 1g bid    overall prognosis poor multiple medical problems'    dispo home vs [facility] pending clinical progress   pt   fall precautions

## 2024-11-05 NOTE — CHART NOTE - NSCHARTNOTEFT_GEN_A_CORE
Called for HA - went to assess patient at bedside. Patient sleeping comfortably, in NAD. BP stable, in aflutter. No neurologic changes. No analgesia ordered.     Patient awoke, and recalled for c/o HA. BP stable, HR remains elevated. 15 mg IV Toradol and 5 mg IV lopressor ordered. UA tox screen ordered. Monitor for response in HR with pain control. Hospitalist aware of above. Called for HA - went to assess patient at bedside. Patient sleeping comfortably, in NAD. BP stable, in aflutter. No neurologic changes. No analgesia ordered.     Patient awoke, and recalled for c/o HA. BP stable, HR remains elevated. No neurologic changes. 15 mg IV Toradol. Second dose of 2.5 mg IV lopressor being administered at this time. UA tox screen ordered. Monitor for response in HR with pain control. Hospitalist aware of above. Called for HA - went to assess patient at bedside. Patient sleeping comfortably, in NAD. BP stable, in aflutter. No neurologic changes. CTH was obtained overnight for HA - negative for acute findings. No analgesia ordered at this time as patient is comfortable.     Patient awoke, and recalled for c/o HA. BP stable, HR remains elevated. No neurologic changes. 15 mg IV Toradol. Second dose of 2.5 mg IV lopressor being administered at this time. UA tox screen ordered. Monitor for response in HR with pain control. Repeat CTH. Hospitalist aware of above. Called for HA - went to assess patient at bedside. Patient sleeping comfortably, in NAD. BP stable, in aflutter. No neurologic changes. CTH was obtained overnight for HA - negative for acute findings. No analgesia ordered at this time as patient is comfortable.     Patient awoke, and recalled for c/o HA. BP stable, HR remains elevated. No neurologic changes - A&Ox3, follows simple commands, no focal neurologic deficits, pupils equal and reactive to light b/l, strength equal and intact x4 extremities.   15 mg IV Toradol. Second dose of 2.5 mg IV lopressor being administered at this time. UA tox screen ordered. Monitor for response in HR with pain control, PRN metoprolol ordered. Repeat CTH pending. Hospitalist aware of above.    HD will not take patient with current HR unless patient is upgraded to SDU, transfer order placed as patient was admitted with uremia and missed HD.

## 2024-11-06 ENCOUNTER — APPOINTMENT (OUTPATIENT)
Dept: GASTROENTEROLOGY | Facility: CLINIC | Age: 64
End: 2024-11-06

## 2024-11-06 LAB
24R-OH-CALCIDIOL SERPL-MCNC: 45.9 NG/ML — SIGNIFICANT CHANGE UP (ref 30–80)
ALLERGY+IMMUNOLOGY DIAG STUDY NOTE: SIGNIFICANT CHANGE UP
BLD GP AB SCN SERPL QL: SIGNIFICANT CHANGE UP
CORTIS AM PEAK SERPL-MCNC: 12.1 UG/DL — SIGNIFICANT CHANGE UP (ref 6–18.4)
CULTURE RESULTS: SIGNIFICANT CHANGE UP
DIR ANTIGLOB POLYSPECIFIC INTERPRETATION: SIGNIFICANT CHANGE UP
FOLATE SERPL-MCNC: 9 NG/ML — SIGNIFICANT CHANGE UP
HCT VFR BLD CALC: 22.1 % — LOW (ref 39–50)
HGB BLD-MCNC: 7 G/DL — CRITICAL LOW (ref 13–17)
IRON SATN MFR SERPL: 44 % — SIGNIFICANT CHANGE UP (ref 16–55)
IRON SATN MFR SERPL: 97 UG/DL — SIGNIFICANT CHANGE UP (ref 59–158)
MCHC RBC-ENTMCNC: 30.2 PG — SIGNIFICANT CHANGE UP (ref 27–34)
MCHC RBC-ENTMCNC: 31.7 G/DL — LOW (ref 32–36)
MCV RBC AUTO: 95.3 FL — SIGNIFICANT CHANGE UP (ref 80–100)
PHOSPHATE SERPL-MCNC: 6.8 MG/DL — HIGH (ref 2.4–4.7)
PLATELET # BLD AUTO: 76 K/UL — LOW (ref 150–400)
RBC # BLD: 2.32 M/UL — LOW (ref 4.2–5.8)
RBC # FLD: 15.2 % — HIGH (ref 10.3–14.5)
SPECIMEN SOURCE: SIGNIFICANT CHANGE UP
TIBC SERPL-MCNC: 220 UG/DL — SIGNIFICANT CHANGE UP (ref 220–430)
TRANSFERRIN SERPL-MCNC: 154 MG/DL — LOW (ref 180–329)
TSH SERPL-MCNC: 0.95 UIU/ML — SIGNIFICANT CHANGE UP (ref 0.27–4.2)
VIT B12 SERPL-MCNC: 764 PG/ML — SIGNIFICANT CHANGE UP (ref 232–1245)
WBC # BLD: 6.83 K/UL — SIGNIFICANT CHANGE UP (ref 3.8–10.5)
WBC # FLD AUTO: 6.83 K/UL — SIGNIFICANT CHANGE UP (ref 3.8–10.5)

## 2024-11-06 PROCEDURE — 99233 SBSQ HOSP IP/OBS HIGH 50: CPT

## 2024-11-06 PROCEDURE — 86077 PHYS BLOOD BANK SERV XMATCH: CPT

## 2024-11-06 RX ORDER — SEVELAMER CARBONATE 800 MG/1
800 TABLET, FILM COATED ORAL
Refills: 0 | Status: DISCONTINUED | OUTPATIENT
Start: 2024-11-06 | End: 2024-11-12

## 2024-11-06 RX ORDER — B-COMPLEX WITH VITAMIN C
1 VIAL (ML) INJECTION DAILY
Refills: 0 | Status: DISCONTINUED | OUTPATIENT
Start: 2024-11-06 | End: 2024-11-12

## 2024-11-06 RX ADMIN — Medication 81 MILLIGRAM(S): at 12:50

## 2024-11-06 RX ADMIN — HYDRALAZINE HYDROCHLORIDE 50 MILLIGRAM(S): 50 TABLET, FILM COATED ORAL at 17:24

## 2024-11-06 RX ADMIN — HYDRALAZINE HYDROCHLORIDE 50 MILLIGRAM(S): 50 TABLET, FILM COATED ORAL at 09:47

## 2024-11-06 RX ADMIN — CARVEDILOL 6.25 MILLIGRAM(S): 25 TABLET, FILM COATED ORAL at 17:24

## 2024-11-06 RX ADMIN — Medication 1 TABLET(S): at 12:50

## 2024-11-06 RX ADMIN — ERYTHROPOIETIN 10000 UNIT(S): 10000 INJECTION, SOLUTION INTRAVENOUS; SUBCUTANEOUS at 06:22

## 2024-11-06 RX ADMIN — SEVELAMER CARBONATE 800 MILLIGRAM(S): 800 TABLET, FILM COATED ORAL at 12:49

## 2024-11-06 RX ADMIN — Medication 0.2 MILLIGRAM(S): at 02:34

## 2024-11-06 RX ADMIN — CHLORHEXIDINE GLUCONATE 1 APPLICATION(S): 40 SOLUTION TOPICAL at 06:26

## 2024-11-06 RX ADMIN — LEVETIRACETAM 1000 MILLIGRAM(S): 500 TABLET, FILM COATED ORAL at 06:23

## 2024-11-06 RX ADMIN — ISOSORBIDE MONONITRATE 30 MILLIGRAM(S): 60 TABLET, EXTENDED RELEASE ORAL at 12:49

## 2024-11-06 RX ADMIN — Medication 40 MILLIGRAM(S): at 21:05

## 2024-11-06 RX ADMIN — CARVEDILOL 6.25 MILLIGRAM(S): 25 TABLET, FILM COATED ORAL at 09:46

## 2024-11-06 RX ADMIN — CLOPIDOGREL 75 MILLIGRAM(S): 75 TABLET ORAL at 12:49

## 2024-11-06 RX ADMIN — CLONIDINE HYDROCHLORIDE 0.1 MILLIGRAM(S): 0.2 TABLET ORAL at 17:24

## 2024-11-06 RX ADMIN — Medication 65 MILLILITER(S): at 06:23

## 2024-11-06 RX ADMIN — LEVETIRACETAM 1000 MILLIGRAM(S): 500 TABLET, FILM COATED ORAL at 17:24

## 2024-11-06 RX ADMIN — Medication 0.4 MILLIGRAM(S): at 21:04

## 2024-11-06 RX ADMIN — Medication 650 MILLIGRAM(S): at 00:33

## 2024-11-06 NOTE — PROGRESS NOTE ADULT - SUBJECTIVE AND OBJECTIVE BOX
JONATHAN GIBSON  ----------------------------------------  The patient was seen earlier at bedside. Patient with anemia. Offered no complaints.    Vital Signs Last 24 Hrs  T(C): 36.7 (2024 07:41), Max: 37 (2024 23:24)  T(F): 98.1 (2024 07:41), Max: 98.6 (2024 23:24)  HR: 108 (2024 10:00) (85 - 132)  BP: 149/86 (2024 10:00) (109/75 - 150/88)  BP(mean): 98 (2024 17:17) (91 - 98)  RR: 17 (2024 10:00) (17 - 18)  SpO2: 98% (2024 10:00) (95% - 99%)    Parameters below as of 2024 08:15  Patient On (Oxygen Delivery Method): room air    PHYSICAL EXAMINATION:  ----------------------------------------  General appearance: No acute distress, Awake, Alert  HEENT: Normocephalic, Atraumatic, Conjunctiva clear, EOMI  Neck: Supple, No JVD, No tenderness  Lungs: Breath sound equal bilaterally, No wheezes, No rales, Right chest wall catheter  Cardiovascular: S1S2, Regular rhythm  Abdomen: Soft, Nontender, Nondistended, No guarding/rebound, Positive bowel sounds  Extremities: No clubbing, No cyanosis, No edema, No calf tenderness, Left upper extremity AV graft  Neuro: Strength equal bilaterally, No tremors  Psychiatric: Appropriate mood, Normal affect    LABORATORY STUDIES:  ----------------------------------------             7.0    6.83  )-----------( 76       ( 2024 07:00 )             22.1     11-05    146[H]  |  105  |  106.5[H]  ----------------------------<  132[H]  5.1   |  23.0  |  8.85[H]    Ca    8.9      2024 05:39  Phos  6.8         TPro  7.5  /  Alb  4.6  /  TBili  2.1[H]  /  DBili  x   /  AST  38  /  ALT  18  /  AlkPhos  68      LIVER FUNCTIONS - ( 2024 00:37 )  Alb: 4.6 g/dL / Pro: 7.5 g/dL / ALK PHOS: 68 U/L / ALT: 18 U/L / AST: 38 U/L / GGT: x           PT/INR - ( 2024 00:37 )   PT: 15.0 sec;   INR: 1.30 ratio    PTT - ( 2024 00:37 )  PTT:24.3 sec    Urinalysis Basic - ( 2024 16:51 )  Color: Yellow / Appearance: Clear / S.021 / pH: x  Gluc: x / Ketone: Negative mg/dL  / Bili: Negative / Urobili: 0.2 mg/dL   Blood: x / Protein: 300 mg/dL / Nitrite: Negative   Leuk Esterase: Negative / RBC: 10 /HPF / WBC 1 /HPF   Sq Epi: x / Non Sq Epi: 1 /HPF / Bacteria: Negative /HPF    MEDICATIONS  (STANDING):  amLODIPine   Tablet 5 milliGRAM(s) Oral daily  aspirin enteric coated 81 milliGRAM(s) Oral daily  atorvastatin 40 milliGRAM(s) Oral at bedtime  carbamide peroxide Otic Solution 5 Drop(s) Right Ear two times a day  carvedilol 6.25 milliGRAM(s) Oral every 12 hours  chlorhexidine 2% Cloths 1 Application(s) Topical <User Schedule>  cloNIDine 0.1 milliGRAM(s) Oral two times a day  clopidogrel Tablet 75 milliGRAM(s) Oral daily  epoetin yolanda-epbx (RETACRIT) Injectable 65500 Unit(s) IV Push <User Schedule>  hydrALAZINE 50 milliGRAM(s) Oral two times a day  isosorbide   mononitrate ER Tablet (IMDUR) 30 milliGRAM(s) Oral daily  levETIRAcetam 1000 milliGRAM(s) Oral two times a day  multivitamin 1 Tablet(s) Oral daily  Nephro-jeanie 1 Tablet(s) Oral daily  sevelamer carbonate 800 milliGRAM(s) Oral three times a day with meals  tamsulosin 0.4 milliGRAM(s) Oral at bedtime  torsemide 10 milliGRAM(s) Oral <User Schedule>    MEDICATIONS  (PRN):  acetaminophen     Tablet .. 650 milliGRAM(s) Oral every 6 hours PRN Temp greater or equal to 38C (100.4F), Mild Pain (1 - 3)  aluminum hydroxide/magnesium hydroxide/simethicone Suspension 30 milliLiter(s) Oral every 4 hours PRN Dyspepsia  melatonin 3 milliGRAM(s) Oral at bedtime PRN Insomnia  metoprolol tartrate Injectable 5 milliGRAM(s) IV Push every 6 hours PRN HR sustaining > 120  ondansetron Injectable 4 milliGRAM(s) IV Push every 8 hours PRN Nausea and/or Vomiting      ASSESSMENT / PLAN:  ----------------------------------------  64M with a history of atrial fibrillation, ESRD, coronary artery disease with bypass surgery, aortic and mitral valve replacements aortic dissection, bowel ischemia with bowel resection who presented with dyspnea after missing the prior two hemodialysis sessions and missing his medications. He was found to have elevated hypertension. Hemodialysis was resumed. Repeat studies noted anemia and required packed red blood cell transfusion.    Dyspne / Fluid overload  - BNP significantly elevated  - Secondary to missing hemodialysis sessions and poor compliance with medications  - Improved after hemodialysis  - Not requiring supplemental oxygen  - CT of the chest was without pulmonary embolus    ESRD  - Nephrology evaluation noted  - To continue with hemodialysis as scheduled via chest wall catheter  - AV graft had not been used as of yet according to the patient    Atrial fibrillation  - Noted to have a history of prior ablation and atrial appendage closure  - On carvedilol    Anemia  - The patient reported prior rectal bleeding gavi to anticoagulation  - For packed red blood cell transfusion  - To continue on erythropoietin  - Review of available records noted a prior evaluation by Gastroenterology noting a negative upper endoscopy and prior colonoscopy in  with cautery of AVMs in the ileocolonic anastamosis     Coronary artery disease / Hypertension  - On aspirin, clopidogrel, and atorvastatin  - Troponin elevation due to demand ischemia from fluid overload and chronic kidney disease  - Close blood pressure monitoring  - Continue on amlodipine, carvedilol, clonidine, hydralazine, and isosorbide    Aortic dissection  - CT chest and abdomen chronic aortic dissection  - Continue with antihypertensive medications    Enlarged mediastinal and right hilar nodes  - Noted on CT of the chest  - For outpatient follow up for repeat CT chest or PET/CT to monitor    Seizures  - On levetiracetam        Dispo: Anticipate discharge home in 1-2 days pending improvement in blood pressure and anemia

## 2024-11-06 NOTE — PROGRESS NOTE ADULT - ASSESSMENT
PA initiated. Await decision A) CRF on HD, Anemia  with  Hx GI blood  loss, A. Fib.; Aortic  dissection  chronic    P) HD , Blood. Follow up labs.

## 2024-11-06 NOTE — PROGRESS NOTE ADULT - SUBJECTIVE AND OBJECTIVE BOX
NEPHROLOGY INTERVAL HPI/OVERNIGHT EVENTS:    No new events.    MEDICATIONS  (STANDING):  amLODIPine   Tablet 5 milliGRAM(s) Oral daily  aspirin enteric coated 81 milliGRAM(s) Oral daily  atorvastatin 40 milliGRAM(s) Oral at bedtime  carbamide peroxide Otic Solution 5 Drop(s) Right Ear two times a day  carvedilol 6.25 milliGRAM(s) Oral every 12 hours  chlorhexidine 2% Cloths 1 Application(s) Topical <User Schedule>  cloNIDine 0.1 milliGRAM(s) Oral two times a day  clopidogrel Tablet 75 milliGRAM(s) Oral daily  dextrose 5%. 1000 milliLiter(s) (65 mL/Hr) IV Continuous <Continuous>  epoetin yolanda-epbx (RETACRIT) Injectable 09546 Unit(s) IV Push <User Schedule>  hydrALAZINE 50 milliGRAM(s) Oral two times a day  isosorbide   mononitrate ER Tablet (IMDUR) 30 milliGRAM(s) Oral daily  levETIRAcetam 1000 milliGRAM(s) Oral two times a day  multivitamin 1 Tablet(s) Oral daily  tamsulosin 0.4 milliGRAM(s) Oral at bedtime  torsemide 10 milliGRAM(s) Oral <User Schedule>    MEDICATIONS  (PRN):  acetaminophen     Tablet .. 650 milliGRAM(s) Oral every 6 hours PRN Temp greater or equal to 38C (100.4F), Mild Pain (1 - 3)  aluminum hydroxide/magnesium hydroxide/simethicone Suspension 30 milliLiter(s) Oral every 4 hours PRN Dyspepsia  melatonin 3 milliGRAM(s) Oral at bedtime PRN Insomnia  metoprolol tartrate Injectable 5 milliGRAM(s) IV Push every 6 hours PRN HR sustaining > 120  ondansetron Injectable 4 milliGRAM(s) IV Push every 8 hours PRN Nausea and/or Vomiting      Allergies    penicillin (Other)            Vital Signs Last 24 Hrs  T(C): 36.7 (2024 07:41), Max: 37 (2024 23:24)  T(F): 98.1 (2024 07:41), Max: 98.6 (2024 23:24)  HR: 116 (2024 08:15) (85 - 132)  BP: 111/79 (2024 08:15) (109/75 - 150/88)  BP(mean): 98 (2024 17:17) (91 - 98)  RR: 18 (2024 08:15) (17 - 19)  SpO2: 97% (2024 08:15) (95% - 100%)    Parameters below as of 2024 08:15  Patient On (Oxygen Delivery Method): room air      Daily     Daily Weight in k.1 (2024 08:15)    PHYSICAL EXAM:    GENERAL: comfortable in bed  HEAD:  wnl  EYES: open  ENMT:   NECK: veins wnl  NERVOUS SYSTEM: alert, oriented    CHEST/LUNG: no 02, no  whezes  HEART: , murmur  same  ABDOMEN: scars, nt  EXTREMITIES:  left upper arm access with  bruit  LYMPH:   SKIN: pale    LABS:                        7.0    6.83  )-----------( 76       ( 2024 07:00 )             22.1     11    146[H]  |  105  |  106.5[H]  ----------------------------<  132[H]  5.1   |  23.0  |  8.85[H]    Ca    8.9      2024 05:39  Phos  6.8     -    TPro  7.5  /  Alb  4.6  /  TBili  2.1[H]  /  DBili  x   /  AST  38  /  ALT  18  /  AlkPhos  68  11-05    PT/INR - ( 2024 00:37 )   PT: 15.0 sec;   INR: 1.30 ratio         PTT - ( 2024 00:37 )  PTT:24.3 sec  Urinalysis Basic - ( 2024 16:51 )    Color: Yellow / Appearance: Clear / S.021 / pH: x  Gluc: x / Ketone: Negative mg/dL  / Bili: Negative / Urobili: 0.2 mg/dL   Blood: x / Protein: 300 mg/dL / Nitrite: Negative   Leuk Esterase: Negative / RBC: 10 /HPF / WBC 1 /HPF   Sq Epi: x / Non Sq Epi: 1 /HPF / Bacteria: Negative /HPF      Phosphorus: 6.8 mg/dL ( @ 07:00)          RADIOLOGY & ADDITIONAL TESTS:

## 2024-11-07 LAB
ANION GAP SERPL CALC-SCNC: 14 MMOL/L — SIGNIFICANT CHANGE UP (ref 5–17)
BUN SERPL-MCNC: 53.6 MG/DL — HIGH (ref 8–20)
CALCIUM SERPL-MCNC: 7.6 MG/DL — LOW (ref 8.4–10.5)
CHLORIDE SERPL-SCNC: 100 MMOL/L — SIGNIFICANT CHANGE UP (ref 96–108)
CO2 SERPL-SCNC: 25 MMOL/L — SIGNIFICANT CHANGE UP (ref 22–29)
CREAT SERPL-MCNC: 6.25 MG/DL — HIGH (ref 0.5–1.3)
EGFR: 9 ML/MIN/1.73M2 — LOW
GLUCOSE SERPL-MCNC: 99 MG/DL — SIGNIFICANT CHANGE UP (ref 70–99)
HCT VFR BLD CALC: 26.7 % — LOW (ref 39–50)
HGB BLD-MCNC: 8.5 G/DL — LOW (ref 13–17)
MCHC RBC-ENTMCNC: 29.5 PG — SIGNIFICANT CHANGE UP (ref 27–34)
MCHC RBC-ENTMCNC: 31.8 G/DL — LOW (ref 32–36)
MCV RBC AUTO: 92.7 FL — SIGNIFICANT CHANGE UP (ref 80–100)
OB PNL STL: POSITIVE
PLATELET # BLD AUTO: 91 K/UL — LOW (ref 150–400)
POTASSIUM SERPL-MCNC: 3.7 MMOL/L — SIGNIFICANT CHANGE UP (ref 3.5–5.3)
POTASSIUM SERPL-SCNC: 3.7 MMOL/L — SIGNIFICANT CHANGE UP (ref 3.5–5.3)
RBC # BLD: 2.88 M/UL — LOW (ref 4.2–5.8)
RBC # FLD: 15.5 % — HIGH (ref 10.3–14.5)
SODIUM SERPL-SCNC: 139 MMOL/L — SIGNIFICANT CHANGE UP (ref 135–145)
WBC # BLD: 6.32 K/UL — SIGNIFICANT CHANGE UP (ref 3.8–10.5)
WBC # FLD AUTO: 6.32 K/UL — SIGNIFICANT CHANGE UP (ref 3.8–10.5)

## 2024-11-07 PROCEDURE — 99233 SBSQ HOSP IP/OBS HIGH 50: CPT

## 2024-11-07 PROCEDURE — 93990 DOPPLER FLOW TESTING: CPT | Mod: 26

## 2024-11-07 PROCEDURE — 70450 CT HEAD/BRAIN W/O DYE: CPT | Mod: 26

## 2024-11-07 RX ORDER — ERYTHROPOIETIN 10000 [IU]/ML
10000 INJECTION, SOLUTION INTRAVENOUS; SUBCUTANEOUS
Refills: 0 | Status: DISCONTINUED | OUTPATIENT
Start: 2024-11-07 | End: 2024-11-12

## 2024-11-07 RX ADMIN — CLONIDINE HYDROCHLORIDE 0.1 MILLIGRAM(S): 0.2 TABLET ORAL at 18:28

## 2024-11-07 RX ADMIN — CARVEDILOL 6.25 MILLIGRAM(S): 25 TABLET, FILM COATED ORAL at 05:05

## 2024-11-07 RX ADMIN — LEVETIRACETAM 1000 MILLIGRAM(S): 500 TABLET, FILM COATED ORAL at 18:28

## 2024-11-07 RX ADMIN — ERYTHROPOIETIN 10000 UNIT(S): 10000 INJECTION, SOLUTION INTRAVENOUS; SUBCUTANEOUS at 16:10

## 2024-11-07 RX ADMIN — Medication 40 MILLIGRAM(S): at 21:05

## 2024-11-07 RX ADMIN — ONDANSETRON HYDROCHLORIDE 4 MILLIGRAM(S): 2 INJECTION, SOLUTION INTRAMUSCULAR; INTRAVENOUS at 21:05

## 2024-11-07 RX ADMIN — HYDRALAZINE HYDROCHLORIDE 50 MILLIGRAM(S): 50 TABLET, FILM COATED ORAL at 05:05

## 2024-11-07 RX ADMIN — CLONIDINE HYDROCHLORIDE 0.1 MILLIGRAM(S): 0.2 TABLET ORAL at 05:05

## 2024-11-07 RX ADMIN — HYDRALAZINE HYDROCHLORIDE 50 MILLIGRAM(S): 50 TABLET, FILM COATED ORAL at 18:28

## 2024-11-07 RX ADMIN — TORSEMIDE 10 MILLIGRAM(S): 100 TABLET ORAL at 12:34

## 2024-11-07 RX ADMIN — Medication 0.4 MILLIGRAM(S): at 21:05

## 2024-11-07 RX ADMIN — CHLORHEXIDINE GLUCONATE 1 APPLICATION(S): 40 SOLUTION TOPICAL at 05:05

## 2024-11-07 RX ADMIN — LEVETIRACETAM 1000 MILLIGRAM(S): 500 TABLET, FILM COATED ORAL at 05:05

## 2024-11-07 RX ADMIN — Medication 5 MILLIGRAM(S): at 05:05

## 2024-11-07 RX ADMIN — CARVEDILOL 6.25 MILLIGRAM(S): 25 TABLET, FILM COATED ORAL at 18:28

## 2024-11-07 NOTE — PROGRESS NOTE ADULT - ASSESSMENT
64M with a history of atrial fibrillation, ESRD, coronary artery disease with bypass surgery, aortic and mitral valve replacements aortic dissection, bowel ischemia with bowel resection who presented with dyspnea after missing the prior two hemodialysis sessions and missing his medications. He was found to have elevated hypertension. Hemodialysis was resumed. Repeat studies noted anemia and required packed red blood cell transfusion.    Dyspnea / Fluid overload  - BNP significantly elevated  - Secondary to missing hemodialysis sessions and poor compliance with medications  - Improved after hemodialysis  - Not requiring supplemental oxygen  - CT of the chest was without pulmonary embolus    ESRD  - Nephrology evaluation noted  - To continue with hemodialysis as scheduled via chest wall catheter  - AV graft had not been used as of yet according to the patient    Atrial fibrillation  - Noted to have a history of prior ablation and atrial appendage closure  - On carvedilol    Anemia  - The patient reported prior rectal bleeding due to anticoagulation  - For packed red blood cell transfusion  - To continue on erythropoietin  - Review of available records noted a prior evaluation by Gastroenterology noting a negative upper endoscopy and prior colonoscopy in 2022 with cautery of AVMs in the ileocolonic anastamosis     Coronary artery disease / Hypertension  - On aspirin, clopidogrel, and atorvastatin  - Troponin elevation due to demand ischemia from fluid overload and chronic kidney disease  - Close blood pressure monitoring  - Continue on amlodipine, carvedilol, clonidine, hydralazine, and isosorbide    Aortic dissection  - CT chest and abdomen chronic aortic dissection  - Continue with antihypertensive medications    Enlarged mediastinal and right hilar nodes  - Noted on CT of the chest  - For outpatient follow up for repeat CT chest or PET/CT to monitor    Seizures  - On levetiracetam    Dispo: C scope possibly monday, Vascular eval for MICHAEL AVG

## 2024-11-07 NOTE — PROGRESS NOTE ADULT - SUBJECTIVE AND OBJECTIVE BOX
JONATHAN GIBSON  64y  Male      Patient is a 64y old  Male who presents with a chief complaint of     INTERVAL HPI/OVERNIGHT EVENTS:  Seen and examined, states multiple BMs with bright blood      REVIEW OF SYSTEMS:  CONSTITUTIONAL: No fever, weight loss, or fatigue  EYES: No eye pain, visual disturbances, or discharge  ENMT:  No difficulty hearing, tinnitus, vertigo; No sinus or throat pain  NECK: No pain or stiffness  BREASTS: No pain, masses, or nipple discharge  RESPIRATORY: No cough, wheezing, chills or hemoptysis; No shortness of breath  CARDIOVASCULAR: No chest pain, palpitations, dizziness, or leg swelling  GASTROINTESTINAL: +hematochezia.  GENITOURINARY: No dysuria, frequency, hematuria, or incontinence  NEUROLOGICAL: No headaches, memory loss, loss of strength, numbness, or tremors  SKIN: No itching, burning, rashes, or lesions   LYMPH NODES: No enlarged glands  ENDOCRINE: No heat or cold intolerance; No hair loss  MUSCULOSKELETAL: No joint pain or swelling; No muscle, back, or extremity pain  PSYCHIATRIC: No depression, anxiety, mood swings, or difficulty sleeping  HEME/LYMPH: No easy bruising, or bleeding gums  ALLERY AND IMMUNOLOGIC: No hives or eczema    T(C): 36.8 (11-07-24 @ 16:00), Max: 37.2 (11-07-24 @ 14:01)  HR: 85 (11-07-24 @ 16:00) (62 - 89)  BP: 151/68 (11-07-24 @ 16:00) (117/66 - 155/66)  RR: 18 (11-07-24 @ 16:00) (18 - 18)  SpO2: 97% (11-07-24 @ 16:00) (93% - 100%)  Wt(kg): --Vital Signs Last 24 Hrs  T(C): 36.8 (07 Nov 2024 16:00), Max: 37.2 (07 Nov 2024 14:01)  T(F): 98.2 (07 Nov 2024 16:00), Max: 98.9 (07 Nov 2024 14:01)  HR: 85 (07 Nov 2024 16:00) (62 - 89)  BP: 151/68 (07 Nov 2024 16:00) (117/66 - 155/66)  BP(mean): --  RR: 18 (07 Nov 2024 16:00) (18 - 18)  SpO2: 97% (07 Nov 2024 16:00) (93% - 100%)    Parameters below as of 07 Nov 2024 16:00  Patient On (Oxygen Delivery Method): room air        PHYSICAL EXAM:  General appearance: No acute distress, Awake, Alert  HEENT: Normocephalic, Atraumatic, Conjunctiva clear, EOMI  Neck: Supple, No JVD, No tenderness  Lungs: Breath sound equal bilaterally, No wheezes, No rales, Right chest wall catheter  Cardiovascular: S1S2, Regular rhythm  Abdomen: Soft, Nontender, Nondistended, No guarding/rebound, Positive bowel sounds  Extremities: No clubbing, No cyanosis, No edema, No calf tenderness, Left upper extremity AV graft  Neuro: Strength equal bilaterally, No tremors  Psychiatric: Appropriate mood, Normal affect    Consultant(s) Notes Reviewed:  [x ] YES  [ ] NO  Care Discussed with Consultants/Other Providers [ x] YES  [ ] NO    LABS:                        8.5    6.32  )-----------( 91       ( 07 Nov 2024 05:45 )             26.7     11-07    139  |  100  |  53.6[H]  ----------------------------<  99  3.7   |  25.0  |  6.25[H]    Ca    7.6[L]      07 Nov 2024 05:45  Phos  6.8     11-06        Urinalysis Basic - ( 07 Nov 2024 05:45 )    Color: x / Appearance: x / SG: x / pH: x  Gluc: 99 mg/dL / Ketone: x  / Bili: x / Urobili: x   Blood: x / Protein: x / Nitrite: x   Leuk Esterase: x / RBC: x / WBC x   Sq Epi: x / Non Sq Epi: x / Bacteria: x      CAPILLARY BLOOD GLUCOSE            Urinalysis Basic - ( 07 Nov 2024 05:45 )    Color: x / Appearance: x / SG: x / pH: x  Gluc: 99 mg/dL / Ketone: x  / Bili: x / Urobili: x   Blood: x / Protein: x / Nitrite: x   Leuk Esterase: x / RBC: x / WBC x   Sq Epi: x / Non Sq Epi: x / Bacteria: x        RADIOLOGY & ADDITIONAL TESTS:    Imaging Personally Reviewed:  [ ] YES  [ ] NO    HEALTH ISSUES - PROBLEM Dx:

## 2024-11-07 NOTE — CONSULT NOTE ADULT - SUBJECTIVE AND OBJECTIVE BOX
Vascular Attending:        HPI:  65 yo male with PMH of paroxysmal atrial fibrillation/flutter (s/p ablation, s/p watchman), BPH, seizure disorder, HTN, HLD, CAD s/p CABG x 2, AVR, MVR 11/2020 with Dr. 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 Associated w/ N/V (NBNB, 2 episodes, last episode earlier today). Pt denies CP, diarrhea, fever, chills, cough. Rest of ROS (-). Pt says he missed his last 2 HD sessions and has not been taking PO meds as he didn't feel good. While in the ED the pt received labetalol 20mg x2, dilaudid 0.5mg x2, nitroglycerin 0.4mg x1, zofran.  (05 Nov 2024 04:24)      PAST MEDICAL & SURGICAL HISTORY:  CHF (congestive heart failure)      CVA (cerebral vascular accident)      Seizures  last seizure 10 years ago      HTN (hypertension)      Hyperlipemia      COVID-19  october 2020      Atrial fibrillation      Former smoker      History of cocaine use  remote hx, currently sober      H/O aortic dissection  s/p repair (2010), surgery complicated by bowel ischemia s/p bowel resection and ostomy with reversal      H/O aortic valve insufficiency      Mitral regurgitation      GIB (gastrointestinal bleeding)      CAD (coronary artery disease)  s/p PCI 1998  and CABG x 2 11/2020      Chronic atrial fibrillation      Aneurysm of artery of upper extremity      Anemia of chronic disease      End stage renal disease      Myocardial infarction      COVID-19 vaccine series completed      Port-A-Cath in place      H/O colectomy      Status post double vessel coronary artery bypass  11/2020 Dr Butler      S/P AVR (aortic valve replacement)  (t)AVR 11/2020 Dr Butler      S/P MVR (mitral valve replacement)  (t)MVR 11/2020 Dr Butler      H/O aortic dissection  Type A; emergent repair 2010      AV fistula  LUE      History of renal transplant      Presence of Watchman left atrial appendage closure device          REVIEW OF SYSTEMS      General:	    Skin/Breast:  	  Ophthalmologic:  	  ENMT:	    Respiratory and Thorax:  	  Cardiovascular:	    Gastrointestinal:	    Genitourinary:	    Musculoskeletal:	    Neurological:	    Psychiatric:	    Hematology/Lymphatics:	    Endocrine:	    Allergic/Immunologic:	    MEDICATIONS  (STANDING):  amLODIPine   Tablet 5 milliGRAM(s) Oral daily  aspirin enteric coated 81 milliGRAM(s) Oral daily  atorvastatin 40 milliGRAM(s) Oral at bedtime  carbamide peroxide Otic Solution 5 Drop(s) Right Ear two times a day  carvedilol 6.25 milliGRAM(s) Oral every 12 hours  chlorhexidine 2% Cloths 1 Application(s) Topical <User Schedule>  cloNIDine 0.1 milliGRAM(s) Oral two times a day  clopidogrel Tablet 75 milliGRAM(s) Oral daily  epoetin yolanda-epbx (RETACRIT) Injectable 33326 Unit(s) IV Push <User Schedule>  hydrALAZINE 50 milliGRAM(s) Oral two times a day  isosorbide   mononitrate ER Tablet (IMDUR) 30 milliGRAM(s) Oral daily  levETIRAcetam 1000 milliGRAM(s) Oral two times a day  multivitamin 1 Tablet(s) Oral daily  Nephro-jeanie 1 Tablet(s) Oral daily  sevelamer carbonate 800 milliGRAM(s) Oral three times a day with meals  tamsulosin 0.4 milliGRAM(s) Oral at bedtime  torsemide 10 milliGRAM(s) Oral <User Schedule>    MEDICATIONS  (PRN):  acetaminophen     Tablet .. 650 milliGRAM(s) Oral every 6 hours PRN Temp greater or equal to 38C (100.4F), Mild Pain (1 - 3)  aluminum hydroxide/magnesium hydroxide/simethicone Suspension 30 milliLiter(s) Oral every 4 hours PRN Dyspepsia  melatonin 3 milliGRAM(s) Oral at bedtime PRN Insomnia  metoprolol tartrate Injectable 5 milliGRAM(s) IV Push every 6 hours PRN HR sustaining > 120  ondansetron Injectable 4 milliGRAM(s) IV Push every 8 hours PRN Nausea and/or Vomiting      Allergies    penicillin (Other)    Intolerances        SOCIAL HISTORY:      Vital Signs Last 24 Hrs  T(C): 37.2 (07 Nov 2024 14:01), Max: 37.2 (06 Nov 2024 14:45)  T(F): 98.9 (07 Nov 2024 14:01), Max: 99 (06 Nov 2024 14:45)  HR: 89 (07 Nov 2024 14:01) (62 - 98)  BP: 155/66 (07 Nov 2024 14:01) (117/66 - 158/85)  BP(mean): --  RR: 18 (07 Nov 2024 14:01) (16 - 18)  SpO2: 100% (07 Nov 2024 14:01) (93% - 100%)    Parameters below as of 07 Nov 2024 14:01  Patient On (Oxygen Delivery Method): room air        PHYSICAL EXAM:      Constitutional:    Eyes:    ENMT:    Neck:    Breasts:    Back:    Respiratory:    Cardiovascular:    Gastrointestinal:    Genitourinary:    Rectal:    Extremities:    Vascular:    Neurological:    Skin:    Lymph Nodes:    Musculoskeletal:    Psychiatric:      Pulses:   Right:                                                                          Left:  FEM [ ]2+ [ ]1+ [ ]doppler                                             FEM [ ]2+ [ ]1+ [ ]doppler    POP [ ]2+ [ ]1+ [ ]doppler                                             POP [ ]2+ [ ]1+ [ ]doppler    DP [ ]2+ [ ]1+ [ ]doppler                                                DP [ ]2+ [ ]1+ [ ]doppler  PT[ ]2+ [ ]1+ [ ]doppler                                                  PT [ ]2+ [ ]1+ [ ]doppler      LABS:                        8.5    6.32  )-----------( 91       ( 07 Nov 2024 05:45 )             26.7     11-07    139  |  100  |  53.6[H]  ----------------------------<  99  3.7   |  25.0  |  6.25[H]    Ca    7.6[L]      07 Nov 2024 05:45  Phos  6.8     11-06        Urinalysis Basic - ( 07 Nov 2024 05:45 )    Color: x / Appearance: x / SG: x / pH: x  Gluc: 99 mg/dL / Ketone: x  / Bili: x / Urobili: x   Blood: x / Protein: x / Nitrite: x   Leuk Esterase: x / RBC: x / WBC x   Sq Epi: x / Non Sq Epi: x / Bacteria: x        RADIOLOGY & ADDITIONAL STUDIES    Impression and Plan: Vascular Attending:  Yvette    Vascular surgery HPI:  Admission HPI reviewed below  Known to our serv  patient with Left brachial-axillary AVG and right IJ permacath  HD is currently via right permacath   avg was created  in august 24  Patient reports that the last time he used the graft the HD nurses was having issues and now he's scared to use it   AVG eval requested         HPI:  65 yo male with PMH of paroxysmal atrial fibrillation/flutter (s/p ablation, s/p watchman), BPH, seizure disorder, HTN, HLD, CAD s/p CABG x 2, AVR, MVR 11/2020 with Dr. 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 Associated w/ N/V (NBNB, 2 episodes, last episode earlier today). Pt denies CP, diarrhea, fever, chills, cough. Rest of ROS (-). Pt says he missed his last 2 HD sessions and has not been taking PO meds as he didn't feel good. While in the ED the pt received labetalol 20mg x2, dilaudid 0.5mg x2, nitroglycerin 0.4mg x1, zofran.  (05 Nov 2024 04:24)      PAST MEDICAL & SURGICAL HISTORY:  CHF (congestive heart failure)      CVA (cerebral vascular accident)      Seizures  last seizure 10 years ago      HTN (hypertension)      Hyperlipemia      COVID-19  october 2020      Atrial fibrillation      Former smoker      History of cocaine use  remote hx, currently sober      H/O aortic dissection  s/p repair (2010), surgery complicated by bowel ischemia s/p bowel resection and ostomy with reversal      H/O aortic valve insufficiency      Mitral regurgitation      GIB (gastrointestinal bleeding)      CAD (coronary artery disease)  s/p PCI 1998  and CABG x 2 11/2020      Chronic atrial fibrillation      Aneurysm of artery of upper extremity      Anemia of chronic disease      End stage renal disease      Myocardial infarction      COVID-19 vaccine series completed      Port-A-Cath in place      H/O colectomy      Status post double vessel coronary artery bypass  11/2020 Dr Butler      S/P AVR (aortic valve replacement)  (t)AVR 11/2020 Dr Butler      S/P MVR (mitral valve replacement)  (t)MVR 11/2020 Dr Butler      H/O aortic dissection  Type A; emergent repair 2010      AV fistula  LUE      History of renal transplant      Presence of Watchman left atrial appendage closure device          REVIEW OF SYSTEMS      General:	    Skin/Breast:  	  Ophthalmologic:  	  ENMT:	    Respiratory and Thorax:  	  Cardiovascular:	    Gastrointestinal:	    Genitourinary:	    Musculoskeletal:	    Neurological:	    Psychiatric:	    Hematology/Lymphatics:	    Endocrine:	    Allergic/Immunologic:	    MEDICATIONS  (STANDING):  amLODIPine   Tablet 5 milliGRAM(s) Oral daily  aspirin enteric coated 81 milliGRAM(s) Oral daily  atorvastatin 40 milliGRAM(s) Oral at bedtime  carbamide peroxide Otic Solution 5 Drop(s) Right Ear two times a day  carvedilol 6.25 milliGRAM(s) Oral every 12 hours  chlorhexidine 2% Cloths 1 Application(s) Topical <User Schedule>  cloNIDine 0.1 milliGRAM(s) Oral two times a day  clopidogrel Tablet 75 milliGRAM(s) Oral daily  epoetin yolanda-epbx (RETACRIT) Injectable 36587 Unit(s) IV Push <User Schedule>  hydrALAZINE 50 milliGRAM(s) Oral two times a day  isosorbide   mononitrate ER Tablet (IMDUR) 30 milliGRAM(s) Oral daily  levETIRAcetam 1000 milliGRAM(s) Oral two times a day  multivitamin 1 Tablet(s) Oral daily  Nephro-jeanie 1 Tablet(s) Oral daily  sevelamer carbonate 800 milliGRAM(s) Oral three times a day with meals  tamsulosin 0.4 milliGRAM(s) Oral at bedtime  torsemide 10 milliGRAM(s) Oral <User Schedule>    MEDICATIONS  (PRN):  acetaminophen     Tablet .. 650 milliGRAM(s) Oral every 6 hours PRN Temp greater or equal to 38C (100.4F), Mild Pain (1 - 3)  aluminum hydroxide/magnesium hydroxide/simethicone Suspension 30 milliLiter(s) Oral every 4 hours PRN Dyspepsia  melatonin 3 milliGRAM(s) Oral at bedtime PRN Insomnia  metoprolol tartrate Injectable 5 milliGRAM(s) IV Push every 6 hours PRN HR sustaining > 120  ondansetron Injectable 4 milliGRAM(s) IV Push every 8 hours PRN Nausea and/or Vomiting      Allergies    penicillin (Other)    Intolerances        SOCIAL HISTORY:      Vital Signs Last 24 Hrs  T(C): 37.2 (07 Nov 2024 14:01), Max: 37.2 (06 Nov 2024 14:45)  T(F): 98.9 (07 Nov 2024 14:01), Max: 99 (06 Nov 2024 14:45)  HR: 89 (07 Nov 2024 14:01) (62 - 98)  BP: 155/66 (07 Nov 2024 14:01) (117/66 - 158/85)  BP(mean): --  RR: 18 (07 Nov 2024 14:01) (16 - 18)  SpO2: 100% (07 Nov 2024 14:01) (93% - 100%)    Parameters below as of 07 Nov 2024 14:01  Patient On (Oxygen Delivery Method): room air        PHYSICAL EXAM:      Constitutional:    Eyes:    ENMT:    Neck:    Breasts:    Back:    Respiratory:    Cardiovascular:    Gastrointestinal:    Genitourinary:    Rectal:    Extremities:    Vascular:    Neurological:    Skin:    Lymph Nodes:    Musculoskeletal:    Psychiatric:      Pulses:   Right:                                                                          Left:  FEM [ ]2+ [ ]1+ [ ]doppler                                             FEM [ ]2+ [ ]1+ [ ]doppler    POP [ ]2+ [ ]1+ [ ]doppler                                             POP [ ]2+ [ ]1+ [ ]doppler    DP [ ]2+ [ ]1+ [ ]doppler                                                DP [ ]2+ [ ]1+ [ ]doppler  PT[ ]2+ [ ]1+ [ ]doppler                                                  PT [ ]2+ [ ]1+ [ ]doppler      LABS:                        8.5    6.32  )-----------( 91       ( 07 Nov 2024 05:45 )             26.7     11-07    139  |  100  |  53.6[H]  ----------------------------<  99  3.7   |  25.0  |  6.25[H]    Ca    7.6[L]      07 Nov 2024 05:45  Phos  6.8     11-06        Urinalysis Basic - ( 07 Nov 2024 05:45 )    Color: x / Appearance: x / SG: x / pH: x  Gluc: 99 mg/dL / Ketone: x  / Bili: x / Urobili: x   Blood: x / Protein: x / Nitrite: x   Leuk Esterase: x / RBC: x / WBC x   Sq Epi: x / Non Sq Epi: x / Bacteria: x        RADIOLOGY & ADDITIONAL STUDIES    Impression and Plan: Vascular Attending:  Yvette    Vascular surgery HPI:  Admission HPI reviewed below  Known to our serv  patient with Left brachial-axillary AVG and right IJ permacath  HD is currently via right permacath   avg was created  in august 24  Patient reports that the last time he used the graft the HD nurses was having issues and now he's scared to use it   AVG eval requested         HPI:  63 yo male with PMH of paroxysmal atrial fibrillation/flutter (s/p ablation, s/p watchman), BPH, seizure disorder, HTN, HLD, CAD s/p CABG x 2, AVR, MVR 11/2020 with Dr. 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 Associated w/ N/V (NBNB, 2 episodes, last episode earlier today). Pt denies CP, diarrhea, fever, chills, cough. Rest of ROS (-). Pt says he missed his last 2 HD sessions and has not been taking PO meds as he didn't feel good. While in the ED the pt received labetalol 20mg x2, dilaudid 0.5mg x2, nitroglycerin 0.4mg x1, zofran.  (05 Nov 2024 04:24)      PAST MEDICAL & SURGICAL HISTORY:  CHF (congestive heart failure)      CVA (cerebral vascular accident)      Seizures  last seizure 10 years ago      HTN (hypertension)      Hyperlipemia      COVID-19  october 2020      Atrial fibrillation      Former smoker      History of cocaine use  remote hx, currently sober      H/O aortic dissection  s/p repair (2010), surgery complicated by bowel ischemia s/p bowel resection and ostomy with reversal      H/O aortic valve insufficiency      Mitral regurgitation      GIB (gastrointestinal bleeding)      CAD (coronary artery disease)  s/p PCI 1998  and CABG x 2 11/2020      Chronic atrial fibrillation      Aneurysm of artery of upper extremity      Anemia of chronic disease      End stage renal disease      Myocardial infarction      COVID-19 vaccine series completed      Port-A-Cath in place      H/O colectomy      Status post double vessel coronary artery bypass  11/2020 Dr Butler      S/P AVR (aortic valve replacement)  (t)AVR 11/2020 Dr Bulter      S/P MVR (mitral valve replacement)  (t)MVR 11/2020 Dr Butler      H/O aortic dissection  Type A; emergent repair 2010      AV fistula  LUE      History of renal transplant      Presence of Watchman left atrial appendage closure device          REVIEW OF SYSTEMS:  see HPI      General:	    Skin/Breast:  	  Ophthalmologic:  	  ENMT:	    Respiratory and Thorax:  	  Cardiovascular:	    Gastrointestinal:	    Genitourinary:	    Musculoskeletal:	    Neurological:	    Psychiatric:	    Hematology/Lymphatics:	    Endocrine:	    Allergic/Immunologic:	    MEDICATIONS  (STANDING):  amLODIPine   Tablet 5 milliGRAM(s) Oral daily  aspirin enteric coated 81 milliGRAM(s) Oral daily  atorvastatin 40 milliGRAM(s) Oral at bedtime  carbamide peroxide Otic Solution 5 Drop(s) Right Ear two times a day  carvedilol 6.25 milliGRAM(s) Oral every 12 hours  chlorhexidine 2% Cloths 1 Application(s) Topical <User Schedule>  cloNIDine 0.1 milliGRAM(s) Oral two times a day  clopidogrel Tablet 75 milliGRAM(s) Oral daily  epoetin yolanda-epbx (RETACRIT) Injectable 60398 Unit(s) IV Push <User Schedule>  hydrALAZINE 50 milliGRAM(s) Oral two times a day  isosorbide   mononitrate ER Tablet (IMDUR) 30 milliGRAM(s) Oral daily  levETIRAcetam 1000 milliGRAM(s) Oral two times a day  multivitamin 1 Tablet(s) Oral daily  Nephro-jeanie 1 Tablet(s) Oral daily  sevelamer carbonate 800 milliGRAM(s) Oral three times a day with meals  tamsulosin 0.4 milliGRAM(s) Oral at bedtime  torsemide 10 milliGRAM(s) Oral <User Schedule>    MEDICATIONS  (PRN):  acetaminophen     Tablet .. 650 milliGRAM(s) Oral every 6 hours PRN Temp greater or equal to 38C (100.4F), Mild Pain (1 - 3)  aluminum hydroxide/magnesium hydroxide/simethicone Suspension 30 milliLiter(s) Oral every 4 hours PRN Dyspepsia  melatonin 3 milliGRAM(s) Oral at bedtime PRN Insomnia  metoprolol tartrate Injectable 5 milliGRAM(s) IV Push every 6 hours PRN HR sustaining > 120  ondansetron Injectable 4 milliGRAM(s) IV Push every 8 hours PRN Nausea and/or Vomiting      Allergies    penicillin (Other)    Intolerances        SOCIAL HISTORY: non contributory       Vital Signs Last 24 Hrs  T(C): 37.2 (07 Nov 2024 14:01), Max: 37.2 (06 Nov 2024 14:45)  T(F): 98.9 (07 Nov 2024 14:01), Max: 99 (06 Nov 2024 14:45)  HR: 89 (07 Nov 2024 14:01) (62 - 98)  BP: 155/66 (07 Nov 2024 14:01) (117/66 - 158/85)  BP(mean): --  RR: 18 (07 Nov 2024 14:01) (16 - 18)  SpO2: 100% (07 Nov 2024 14:01) (93% - 100%)    Parameters below as of 07 Nov 2024 14:01  Patient On (Oxygen Delivery Method): room air        PHYSICAL EXAM:       Constitutional: no distress, alert and oriented     Eyes: no scleral icterus     ENMT: atraumatic     Neck: right IJ permacath in place   \\  Respiratory: non labored     Cardiovascular: rrr via peripheral palpation     Gastrointestinal:  no gross distention     Genitourinary: not examined     Rectal: not examined     Extremities: warm, no edema     Vascular: left upper brachial-ax avg with good thrill     Neurological: no gross noticeable motor deficits     Skin: warm     Psychiatric: appropriate             LABS:                        8.5    6.32  )-----------( 91       ( 07 Nov 2024 05:45 )             26.7     11-07    139  |  100  |  53.6[H]  ----------------------------<  99  3.7   |  25.0  |  6.25[H]    Ca    7.6[L]      07 Nov 2024 05:45  Phos  6.8     11-06        Urinalysis Basic - ( 07 Nov 2024 05:45 )    Color: x / Appearance: x / SG: x / pH: x  Gluc: 99 mg/dL / Ketone: x  / Bili: x / Urobili: x   Blood: x / Protein: x / Nitrite: x   Leuk Esterase: x / RBC: x / WBC x   Sq Epi: x / Non Sq Epi: x / Bacteria: x        RADIOLOGY & ADDITIONAL STUDIES    Impression and Plan:    patent left AVG, brachial- axillary  patient hesitant towards reinitiating HD using the AVG    --- Duplex ordered to document flow volumes  -- Patient strongly advised to permit AVG use at next HD session.  The Right permacath will then be removed if successful

## 2024-11-07 NOTE — PROGRESS NOTE ADULT - SUBJECTIVE AND OBJECTIVE BOX
NEPHROLOGY INTERVAL HPI/OVERNIGHT EVENTS:    No new events.    MEDICATIONS  (STANDING):  amLODIPine   Tablet 5 milliGRAM(s) Oral daily  aspirin enteric coated 81 milliGRAM(s) Oral daily  atorvastatin 40 milliGRAM(s) Oral at bedtime  carbamide peroxide Otic Solution 5 Drop(s) Right Ear two times a day  carvedilol 6.25 milliGRAM(s) Oral every 12 hours  chlorhexidine 2% Cloths 1 Application(s) Topical <User Schedule>  cloNIDine 0.1 milliGRAM(s) Oral two times a day  clopidogrel Tablet 75 milliGRAM(s) Oral daily  dextrose 5%. 1000 milliLiter(s) (65 mL/Hr) IV Continuous <Continuous>  epoetin yolanda-epbx (RETACRIT) Injectable 50946 Unit(s) IV Push <User Schedule>  hydrALAZINE 50 milliGRAM(s) Oral two times a day  isosorbide   mononitrate ER Tablet (IMDUR) 30 milliGRAM(s) Oral daily  levETIRAcetam 1000 milliGRAM(s) Oral two times a day  multivitamin 1 Tablet(s) Oral daily  tamsulosin 0.4 milliGRAM(s) Oral at bedtime  torsemide 10 milliGRAM(s) Oral <User Schedule>    MEDICATIONS  (PRN):  acetaminophen     Tablet .. 650 milliGRAM(s) Oral every 6 hours PRN Temp greater or equal to 38C (100.4F), Mild Pain (1 - 3)  aluminum hydroxide/magnesium hydroxide/simethicone Suspension 30 milliLiter(s) Oral every 4 hours PRN Dyspepsia  melatonin 3 milliGRAM(s) Oral at bedtime PRN Insomnia  metoprolol tartrate Injectable 5 milliGRAM(s) IV Push every 6 hours PRN HR sustaining > 120  ondansetron Injectable 4 milliGRAM(s) IV Push every 8 hours PRN Nausea and/or Vomiting      Allergies    penicillin (Other)      Vital Signs Last 24 Hrs  T(C): 36.9 (2024 09:22), Max: 37.2 (2024 14:45)  T(F): 98.4 (2024 09:22), Max: 99 (2024 14:45)  HR: 85 (2024 09:22) (62 - 98)  BP: 117/66 (2024 09:22) (117/66 - 158/85)  BP(mean): --  RR: 18 (:22) (16 - 18)  SpO2: 97% (:) (93% - 97%)    Parameters below as of :22  Patient On (Oxygen Delivery Method): room air      T(C): 36.7 (2024 07:41), Max: 37 (2024 23:24)  T(F): 98.1 (2024 07:41), Max: 98.6 (2024 23:24)  HR: 116 (2024 08:15) (85 - 132)  BP: 111/79 (2024 08:15) (109/75 - 150/88)  BP(mean): 98 (2024 17:17) (91 - 98)  RR: 18 (2024 08:15) (17 - 19)  SpO2: 97% (2024 08:15) (95% - 100%)    Parameters below as of 2024 08:15  Patient On (Oxygen Delivery Method): room air         Daily Weight in k.1 (2024 08:15)    PHYSICAL EXAM:    GENERAL: comfortable in bed  HEAD:  wnl  EYES: open  ENMT:   NECK: veins wnl  NERVOUS SYSTEM: alert, oriented    CHEST/LUNG: no 02, no  wheezes   HEART: , murmur  same  ABDOMEN: scars, nt  EXTREMITIES:  left upper arm access with  bruit  LYMPH:   SKIN: pale    LABS:        139  |  100  |  53.6[H]  ----------------------------<  99  3.7   |  25.0  |  6.25[H]    Ca    7.6[L]      2024 05:45  Phos  6.8                                 7.0    6.83  )-----------( 76       ( 2024 07:00 )             22.1         146[H]  |  105  |  106.5[H]  ----------------------------<  132[H]  5.1   |  23.0  |  8.85[H]    Ca    8.9      2024 05:39  Phos  6.8         TPro  7.5  /  Alb  4.6  /  TBili  2.1[H]  /  DBili  x   /  AST  38  /  ALT  18  /  AlkPhos  68      PT/INR - ( 2024 00:37 )   PT: 15.0 sec;   INR: 1.30 ratio         PTT - ( 2024 00:37 )  PTT:24.3 sec  Urinalysis Basic - ( 2024 16:51 )    Color: Yellow / Appearance: Clear / S.021 / pH: x  Gluc: x / Ketone: Negative mg/dL  / Bili: Negative / Urobili: 0.2 mg/dL   Blood: x / Protein: 300 mg/dL / Nitrite: Negative   Leuk Esterase: Negative / RBC: 10 /HPF / WBC 1 /HPF   Sq Epi: x / Non Sq Epi: 1 /HPF / Bacteria: Negative /HPF      Phosphorus: 6.8 mg/dL ( @ 07:00)          RADIOLOGY & ADDITIONAL TESTS:

## 2024-11-07 NOTE — CONSULT NOTE ADULT - SUBJECTIVE AND OBJECTIVE BOX
HISTORY OF PRESENT ILLNESS: 63 yo male with PMH of paroxysmal atrial fibrillation/flutter (s/p ablation, s/p watchman), BPH, seizure disorder, HTN, HLD, CAD s/p CABG x 2, AVR, MVR 11/2020 with Dr. 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 GI consulted after rectal bleeding.  Patient reported rectal bleeding for the past 2-3 weeks prior to admission and had a large BM with rectal bleeding overnight. His hemoglobin dropped to 7.0 from baseline 8.0- 9.0 gm yesterday for which he received 1 units of PRBC. His repeat hgb 8.8 gm this morning. Dines abdominal pain, nausea, vomiting. His last colonoscopy was   June 2022 and showed neovascular at the anastomoses (s/p partial colectomy).       Review of Systems:  . Constitutional: No fever, chills  . HEENT: Negative  · Respiratory and Thorax: No shortness of breath, no cough  · Cardiovascular: No chest pain, palpitation, no dizziness  · Gastrointestinal: see above  · Genitourinary: No hematuria  · Musculoskeletal: Negative  · Neurological: negative  · Psychiatric: no agitation, no anxiety    PAST MEDICAL/SURGICAL HISTORY:  CHF (congestive heart failure)    CVA (cerebral vascular accident)    Seizures  last seizure 10 years ago    HTN (hypertension)    Hyperlipemia    COVID-19  october 2020    Atrial fibrillation    Former smoker    History of cocaine use  remote hx, currently sober    H/O aortic dissection  s/p repair (2010), surgery complicated by bowel ischemia s/p bowel resection and ostomy with reversal    H/O aortic valve insufficiency    Mitral regurgitation    GIB (gastrointestinal bleeding)    CAD (coronary artery disease)  s/p PCI 1998  and CABG x 2 11/2020    Chronic atrial fibrillation    Aneurysm of artery of upper extremity    Anemia of chronic disease    End stage renal disease    Myocardial infarction    COVID-19 vaccine series completed    Port-A-Cath in place    H/O colectomy    Status post double vessel coronary artery bypass  11/2020 Dr Butler    S/P AVR (aortic valve replacement)  (t)AVR 11/2020 Dr Butler    S/P MVR (mitral valve replacement)  (t)MVR 11/2020 Dr Butler    H/O aortic dissection  Type A; emergent repair 2010    AV fistula  LUE    History of renal transplant    Presence of Watchman left atrial appendage closure device      SOCIAL HISTORY:  - TOBACCO: Denies  - ALCOHOL: Denies  - ILLICIT DRUG USE: Denies    FAMILY HISTORY:  No known history of gastrointestinal or liver disease;  FH: hypertension    Family history of cardiac disorder (Mother)  mother        HOME MEDICATIONS:  amLODIPine 10 mg oral tablet: 1 tab(s) orally once a day (19 Sep 2024 09:35)  Aspi-Cor 81 mg oral delayed release tablet: 1 tab(s) orally once a day (19 Sep 2024 10:53)  atorvastatin 40 mg oral tablet: 1 tab(s) orally once a day (at bedtime) (19 Sep 2024 09:35)  carvedilol 6.25 mg oral tablet: 1 tab(s) orally every 12 hours (19 Sep 2024 09:35)  hydrALAZINE 50 mg oral tablet: 1 tab(s) orally 2 times a day (19 Sep 2024 09:33)  isosorbide mononitrate 30 mg oral tablet, extended release: 1 tab(s) orally once a day (19 Sep 2024 09:35)  tamsulosin 0.4 mg oral capsule: 1 cap(s) orally once a day (at bedtime) (19 Sep 2024 09:35)  torsemide 10 mg oral tablet: 1 tab(s) orally every other day (19 Sep 2024 09:35)    INPATIENT MEDICATIONS:  MEDICATIONS  (STANDING):  amLODIPine   Tablet 5 milliGRAM(s) Oral daily  aspirin enteric coated 81 milliGRAM(s) Oral daily  atorvastatin 40 milliGRAM(s) Oral at bedtime  carbamide peroxide Otic Solution 5 Drop(s) Right Ear two times a day  carvedilol 6.25 milliGRAM(s) Oral every 12 hours  chlorhexidine 2% Cloths 1 Application(s) Topical <User Schedule>  cloNIDine 0.1 milliGRAM(s) Oral two times a day  clopidogrel Tablet 75 milliGRAM(s) Oral daily  epoetin yolanda-epbx (RETACRIT) Injectable 77821 Unit(s) IV Push <User Schedule>  hydrALAZINE 50 milliGRAM(s) Oral two times a day  isosorbide   mononitrate ER Tablet (IMDUR) 30 milliGRAM(s) Oral daily  levETIRAcetam 1000 milliGRAM(s) Oral two times a day  multivitamin 1 Tablet(s) Oral daily  Nephro-jeanie 1 Tablet(s) Oral daily  sevelamer carbonate 800 milliGRAM(s) Oral three times a day with meals  tamsulosin 0.4 milliGRAM(s) Oral at bedtime  torsemide 10 milliGRAM(s) Oral <User Schedule>    MEDICATIONS  (PRN):  acetaminophen     Tablet .. 650 milliGRAM(s) Oral every 6 hours PRN Temp greater or equal to 38C (100.4F), Mild Pain (1 - 3)  aluminum hydroxide/magnesium hydroxide/simethicone Suspension 30 milliLiter(s) Oral every 4 hours PRN Dyspepsia  melatonin 3 milliGRAM(s) Oral at bedtime PRN Insomnia  metoprolol tartrate Injectable 5 milliGRAM(s) IV Push every 6 hours PRN HR sustaining > 120  ondansetron Injectable 4 milliGRAM(s) IV Push every 8 hours PRN Nausea and/or Vomiting    ALLERGIES:  penicillin (Other)    T(C): 37.2 (11-07-24 @ 14:01), Max: 37.2 (11-07-24 @ 14:01)  HR: 89 (11-07-24 @ 14:01) (62 - 89)  BP: 155/66 (11-07-24 @ 14:01) (117/66 - 158/85)  RR: 18 (11-07-24 @ 14:01) (18 - 18)  SpO2: 100% (11-07-24 @ 14:01) (93% - 100%)    11-06-24 @ 07:01  -  11-07-24 @ 07:00  --------------------------------------------------------  IN: 0 mL / OUT: 0 mL / NET: 0 mL        PHYSICAL EXAM:  Constitutional: No acute distress  Neuro: Awake alert, oriented  HEENT: anicteric sclerae  CV: regular rate, regular rhythm  Pulm/chest: lung sounds diminished bilaterally, no accessory muscle use noted  Abd: soft, nontender, nondistended +bowel sounds. No rigidity, rebound tenderness, or guarding  Ext: no edema  Skin: warm, no jaundice   Psych: calm, cooperative        LABS:             8.5    6.32  )-----------( 91       ( 11-07 @ 05:45 )             26.7                7.0    6.83  )-----------( 76       ( 11-06 @ 07:00 )             22.1                7.9    7.86  )-----------( 68       ( 11-05 @ 05:39 )             25.4                8.5    10.52 )-----------( 82       ( 11-05 @ 00:37 )             27.3         11-07    139  |  100  |  53.6[H]  ----------------------------<  99  3.7   |  25.0  |  6.25[H]    Ca    7.6[L]      07 Nov 2024 05:45  Phos  6.8     11-06      % Saturation, Iron: 44 % (11-06-24 @ 07:00)  Iron Total: 97 ug/dL (11-06-24 @ 07:00)  Iron - Total Binding Capacity.: 220 ug/dL (11-06-24 @ 07:00)    Urinalysis Basic - ( 07 Nov 2024 05:45 )    Color: x / Appearance: x / SG: x / pH: x  Gluc: 99 mg/dL / Ketone: x  / Bili: x / Urobili: x   Blood: x / Protein: x / Nitrite: x   Leuk Esterase: x / RBC: x / WBC x   Sq Epi: x / Non Sq Epi: x / Bacteria: x        Culture - Blood (collected 05 Nov 2024 17:12)  Source: .Blood BLOOD  Preliminary Report (06 Nov 2024 22:01):    No growth at 24 hours    Culture - Urine (collected 05 Nov 2024 16:51)  Source: Catheterized Catheterized  Final Report (06 Nov 2024 18:59):    <10,000 CFU/mL Normal Urogenital Sivan      < from: Colonoscopy (06.29.22 @ 08:54) >      Findings:        Additional findings - The patient is S/P subtotal colectomy. Patient with    anastomosis 10 cm from anal verge. It appears to be an end to side anastomosis.    There is neovasculature at the anastomosis site. No AVM. NO bleeding. There is    slight scope trauma . + internal hemorrhods. Scope passed to 60 cm from anal.    Verge,- normal TI..        Impressions:        - The patient is S/P subtotal colectomy. Patient with anastomosis 10 cm from    anal verge. It appears to be an end to side anastomosis. There is neovasculature    at the anastomosis site. No AVM. NO bleeding. There is slight scope trauma. +    internal hemorrhods. Scope passed to 60 cm from anal. Verge,- normal TI. .        Plan:        Return to floor for further management        Additional Notes:        regular diet- D/C home tomorrow if no relbeeding        restart elaquis        anusol HC cream ( pt strained before the rectal bleeding)        may have anemia of chronic disease.  Had 2 other admissions for anemia. EGD in    11/20 and 3/21 were essentially negative        Lacie Garzon MD, DO    < end of copied text >

## 2024-11-08 LAB
ANION GAP SERPL CALC-SCNC: 13 MMOL/L — SIGNIFICANT CHANGE UP (ref 5–17)
BUN SERPL-MCNC: 32.3 MG/DL — HIGH (ref 8–20)
CALCIUM SERPL-MCNC: 7.7 MG/DL — LOW (ref 8.4–10.5)
CHLORIDE SERPL-SCNC: 100 MMOL/L — SIGNIFICANT CHANGE UP (ref 96–108)
CO2 SERPL-SCNC: 25 MMOL/L — SIGNIFICANT CHANGE UP (ref 22–29)
CREAT SERPL-MCNC: 5.18 MG/DL — HIGH (ref 0.5–1.3)
EGFR: 12 ML/MIN/1.73M2 — LOW
GLUCOSE SERPL-MCNC: 112 MG/DL — HIGH (ref 70–99)
HCT VFR BLD CALC: 25.1 % — LOW (ref 39–50)
HGB BLD-MCNC: 8 G/DL — LOW (ref 13–17)
MAGNESIUM SERPL-MCNC: 1.9 MG/DL — SIGNIFICANT CHANGE UP (ref 1.6–2.6)
MCHC RBC-ENTMCNC: 29.9 PG — SIGNIFICANT CHANGE UP (ref 27–34)
MCHC RBC-ENTMCNC: 31.9 G/DL — LOW (ref 32–36)
MCV RBC AUTO: 93.7 FL — SIGNIFICANT CHANGE UP (ref 80–100)
PHOSPHATE SERPL-MCNC: 2.4 MG/DL — SIGNIFICANT CHANGE UP (ref 2.4–4.7)
PLATELET # BLD AUTO: 108 K/UL — LOW (ref 150–400)
POTASSIUM SERPL-MCNC: 3.8 MMOL/L — SIGNIFICANT CHANGE UP (ref 3.5–5.3)
POTASSIUM SERPL-SCNC: 3.8 MMOL/L — SIGNIFICANT CHANGE UP (ref 3.5–5.3)
RBC # BLD: 2.68 M/UL — LOW (ref 4.2–5.8)
RBC # FLD: 15.2 % — HIGH (ref 10.3–14.5)
SODIUM SERPL-SCNC: 138 MMOL/L — SIGNIFICANT CHANGE UP (ref 135–145)
WBC # BLD: 6.92 K/UL — SIGNIFICANT CHANGE UP (ref 3.8–10.5)
WBC # FLD AUTO: 6.92 K/UL — SIGNIFICANT CHANGE UP (ref 3.8–10.5)

## 2024-11-08 PROCEDURE — 99233 SBSQ HOSP IP/OBS HIGH 50: CPT

## 2024-11-08 RX ADMIN — SEVELAMER CARBONATE 800 MILLIGRAM(S): 800 TABLET, FILM COATED ORAL at 11:32

## 2024-11-08 RX ADMIN — Medication 3 MILLIGRAM(S): at 00:02

## 2024-11-08 RX ADMIN — CLONIDINE HYDROCHLORIDE 0.1 MILLIGRAM(S): 0.2 TABLET ORAL at 08:30

## 2024-11-08 RX ADMIN — LEVETIRACETAM 1000 MILLIGRAM(S): 500 TABLET, FILM COATED ORAL at 08:29

## 2024-11-08 RX ADMIN — Medication 0.4 MILLIGRAM(S): at 20:57

## 2024-11-08 RX ADMIN — CLONIDINE HYDROCHLORIDE 0.1 MILLIGRAM(S): 0.2 TABLET ORAL at 17:11

## 2024-11-08 RX ADMIN — Medication 30 MILLILITER(S): at 15:39

## 2024-11-08 RX ADMIN — ONDANSETRON HYDROCHLORIDE 4 MILLIGRAM(S): 2 INJECTION, SOLUTION INTRAMUSCULAR; INTRAVENOUS at 05:38

## 2024-11-08 RX ADMIN — LEVETIRACETAM 1000 MILLIGRAM(S): 500 TABLET, FILM COATED ORAL at 17:12

## 2024-11-08 RX ADMIN — HYDRALAZINE HYDROCHLORIDE 50 MILLIGRAM(S): 50 TABLET, FILM COATED ORAL at 17:12

## 2024-11-08 RX ADMIN — Medication 40 MILLIGRAM(S): at 20:57

## 2024-11-08 RX ADMIN — HYDRALAZINE HYDROCHLORIDE 50 MILLIGRAM(S): 50 TABLET, FILM COATED ORAL at 08:30

## 2024-11-08 RX ADMIN — SEVELAMER CARBONATE 800 MILLIGRAM(S): 800 TABLET, FILM COATED ORAL at 17:12

## 2024-11-08 RX ADMIN — ISOSORBIDE MONONITRATE 30 MILLIGRAM(S): 60 TABLET, EXTENDED RELEASE ORAL at 11:32

## 2024-11-08 RX ADMIN — Medication 1 TABLET(S): at 11:31

## 2024-11-08 RX ADMIN — CARVEDILOL 6.25 MILLIGRAM(S): 25 TABLET, FILM COATED ORAL at 17:12

## 2024-11-08 RX ADMIN — ONDANSETRON HYDROCHLORIDE 4 MILLIGRAM(S): 2 INJECTION, SOLUTION INTRAMUSCULAR; INTRAVENOUS at 20:57

## 2024-11-08 RX ADMIN — Medication 5 MILLIGRAM(S): at 08:29

## 2024-11-08 RX ADMIN — SEVELAMER CARBONATE 800 MILLIGRAM(S): 800 TABLET, FILM COATED ORAL at 08:29

## 2024-11-08 RX ADMIN — CARVEDILOL 6.25 MILLIGRAM(S): 25 TABLET, FILM COATED ORAL at 08:30

## 2024-11-08 NOTE — CHART NOTE - NSCHARTNOTEFT_GEN_A_CORE
GI consulted for rectal bleeding. Hgb stable at 8 today. Will plan for colonoscopy Monday, 11/11/24. Will need Clear liquids Sunday, and NPO after MN Sunday. Prep to be ordered Sunday.

## 2024-11-08 NOTE — PROGRESS NOTE ADULT - SUBJECTIVE AND OBJECTIVE BOX
JONATHAN GIBSON 64y Male  MRN#: [id number]       SUBJECTIVE  Patient is a 64y old Male who presents with a chief complaint of ZUNIGA  Anemia (07 Nov 2024 18:01)  Currently admitted to medicine with the primary diagnosis of Acute dyspnea    Hospital course has been complicated by LGIB.   Today is hospital day 3d, and this morning he is _________ and reports no overnight events.       OBJECTIVE  PAST MEDICAL & SURGICAL HISTORY  CHF (congestive heart failure)    CVA (cerebral vascular accident)    Seizures  last seizure 10 years ago    HTN (hypertension)    Hyperlipemia    COVID-19  october 2020    Atrial fibrillation    Former smoker    History of cocaine use  remote hx, currently sober    H/O aortic dissection  s/p repair (2010), surgery complicated by bowel ischemia s/p bowel resection and ostomy with reversal    H/O aortic valve insufficiency    Mitral regurgitation    GIB (gastrointestinal bleeding)    CAD (coronary artery disease)  s/p PCI 1998  and CABG x 2 11/2020    Chronic atrial fibrillation    Aneurysm of artery of upper extremity    Anemia of chronic disease    End stage renal disease    Myocardial infarction    COVID-19 vaccine series completed    Port-A-Cath in place    H/O colectomy    Status post double vessel coronary artery bypass  11/2020 Dr Butler    S/P AVR (aortic valve replacement)  (t)AVR 11/2020 Dr Butler    S/P MVR (mitral valve replacement)  (t)MVR 11/2020 Dr Butler    H/O aortic dissection  Type A; emergent repair 2010    AV fistula  LUE    History of renal transplant    Presence of Watchman left atrial appendage closure device      Home Meds:  amLODIPine 10 mg oral tablet: 1 tab(s) orally once a day  Aspi-Cor 81 mg oral delayed release tablet: 1 tab(s) orally once a day  atorvastatin 40 mg oral tablet: 1 tab(s) orally once a day (at bedtime)  carvedilol 6.25 mg oral tablet: 1 tab(s) orally every 12 hours  cloNIDine 0.2 mg oral tablet: 1 tab(s) orally 2 times a day  hydrALAZINE 50 mg oral tablet: 1 tab(s) orally 2 times a day  isosorbide mononitrate 30 mg oral tablet, extended release: 1 tab(s) orally once a day  levETIRAcetam 1000 mg oral tablet: 1 tab(s) orally 2 times a day  Nephro-Jeanie oral tablet: 1 tab(s) orally once a day  Plavix 75 mg oral tablet: 1 tab(s) orally once a day  tamsulosin 0.4 mg oral capsule: 1 cap(s) orally once a day (at bedtime)  torsemide 10 mg oral tablet: 1 tab(s) orally every other day    ALLERGIES:  penicillin (Other)    MEDICATIONS:  STANDING MEDICATIONS  amLODIPine   Tablet 5 milliGRAM(s) Oral daily  aspirin enteric coated 81 milliGRAM(s) Oral daily  atorvastatin 40 milliGRAM(s) Oral at bedtime  carbamide peroxide Otic Solution 5 Drop(s) Right Ear two times a day  carvedilol 6.25 milliGRAM(s) Oral every 12 hours  chlorhexidine 2% Cloths 1 Application(s) Topical <User Schedule>  cloNIDine 0.1 milliGRAM(s) Oral two times a day  clopidogrel Tablet 75 milliGRAM(s) Oral daily  epoetin yolanda-epbx (RETACRIT) Injectable 17008 Unit(s) IV Push <User Schedule>  hydrALAZINE 50 milliGRAM(s) Oral two times a day  isosorbide   mononitrate ER Tablet (IMDUR) 30 milliGRAM(s) Oral daily  levETIRAcetam 1000 milliGRAM(s) Oral two times a day  multivitamin 1 Tablet(s) Oral daily  Nephro-jeanie 1 Tablet(s) Oral daily  sevelamer carbonate 800 milliGRAM(s) Oral three times a day with meals  tamsulosin 0.4 milliGRAM(s) Oral at bedtime  torsemide 10 milliGRAM(s) Oral <User Schedule>    PRN MEDICATIONS  acetaminophen     Tablet .. 650 milliGRAM(s) Oral every 6 hours PRN  aluminum hydroxide/magnesium hydroxide/simethicone Suspension 30 milliLiter(s) Oral every 4 hours PRN  melatonin 3 milliGRAM(s) Oral at bedtime PRN  metoprolol tartrate Injectable 5 milliGRAM(s) IV Push every 6 hours PRN  ondansetron Injectable 4 milliGRAM(s) IV Push every 8 hours PRN      VITAL SIGNS: Last 24 Hours  T(C): 36.9 (08 Nov 2024 05:12), Max: 37.2 (07 Nov 2024 14:01)  T(F): 98.4 (08 Nov 2024 05:12), Max: 98.9 (07 Nov 2024 14:01)  HR: 76 (08 Nov 2024 05:12) (76 - 91)  BP: 169/72 (08 Nov 2024 05:12) (117/66 - 169/72)  BP(mean): --  RR: 18 (08 Nov 2024 05:12) (18 - 18)  SpO2: 94% (08 Nov 2024 05:12) (94% - 100%)    LABS:                        8.0    6.92  )-----------( 108      ( 08 Nov 2024 05:35 )             25.1     11-08    138  |  100  |  32.3[H]  ----------------------------<  112[H]  3.8   |  25.0  |  5.18[H]    Ca    7.7[L]      08 Nov 2024 05:35  Phos  2.4     11-08  Mg     1.9     11-08        Urinalysis Basic - ( 08 Nov 2024 05:35 )    Color: x / Appearance: x / SG: x / pH: x  Gluc: 112 mg/dL / Ketone: x  / Bili: x / Urobili: x   Blood: x / Protein: x / Nitrite: x   Leuk Esterase: x / RBC: x / WBC x   Sq Epi: x / Non Sq Epi: x / Bacteria: x            Culture - Blood (collected 05 Nov 2024 17:12)  Source: .Blood BLOOD  Preliminary Report (07 Nov 2024 22:01):    No growth at 48 Hours    Culture - Urine (collected 05 Nov 2024 16:51)  Source: Catheterized Catheterized  Final Report (06 Nov 2024 18:59):    <10,000 CFU/mL Normal Urogenital Sivan          RADIOLOGY:  Reviewed     PHYSICAL EXAM:  General: NAD, AAOx3  HEENT: atraumatic, normocephalic  Pulmonary: clear to auscultation bilaterally; No wheeze  Cardiovascular: Regular rate and rhythm; no murmurs, rubs or gallops. Normal S1S2  Gastrointestinal: Soft, nontender, nondistended; bowel sounds present  Musculoskeletal: 2+ peripheral pulses, no clubbing, cyanosis or edema  Neurology: Pt. alert and oriented, fluent speech, able to move all extremities  Skin: no rashes or lesions             JONATHAN GIBSON 64y Male  MRN#: [id number]       SUBJECTIVE  Patient is a 64y old Male who presents with a chief complaint of ZUNIGA  Anemia (07 Nov 2024 18:01)  Currently admitted to medicine with the primary diagnosis of Acute dyspnea    Hospital course has been complicated by LGIB.   Today is hospital day 3d, and this morning he is feeling better, no bloody BMs overnight, and reports no overnight events. Nursing endorses patient is refusing to take medications. Patients says he is refusing meds to stop the bleeding. Unclear if patient also refused to take meds at home.        OBJECTIVE  PAST MEDICAL & SURGICAL HISTORY  CHF (congestive heart failure)    CVA (cerebral vascular accident)    Seizures  last seizure 10 years ago    HTN (hypertension)    Hyperlipemia    COVID-19  october 2020    Atrial fibrillation    Former smoker    History of cocaine use  remote hx, currently sober    H/O aortic dissection  s/p repair (2010), surgery complicated by bowel ischemia s/p bowel resection and ostomy with reversal    H/O aortic valve insufficiency    Mitral regurgitation    GIB (gastrointestinal bleeding)    CAD (coronary artery disease)  s/p PCI 1998  and CABG x 2 11/2020    Chronic atrial fibrillation    Aneurysm of artery of upper extremity    Anemia of chronic disease    End stage renal disease    Myocardial infarction    COVID-19 vaccine series completed    Port-A-Cath in place    H/O colectomy    Status post double vessel coronary artery bypass  11/2020 Dr Butler    S/P AVR (aortic valve replacement)  (t)AVR 11/2020 Dr Butler    S/P MVR (mitral valve replacement)  (t)MVR 11/2020 Dr Butler    H/O aortic dissection  Type A; emergent repair 2010    AV fistula  LUE    History of renal transplant    Presence of Watchman left atrial appendage closure device      Home Meds:  amLODIPine 10 mg oral tablet: 1 tab(s) orally once a day  Aspi-Cor 81 mg oral delayed release tablet: 1 tab(s) orally once a day  atorvastatin 40 mg oral tablet: 1 tab(s) orally once a day (at bedtime)  carvedilol 6.25 mg oral tablet: 1 tab(s) orally every 12 hours  cloNIDine 0.2 mg oral tablet: 1 tab(s) orally 2 times a day  hydrALAZINE 50 mg oral tablet: 1 tab(s) orally 2 times a day  isosorbide mononitrate 30 mg oral tablet, extended release: 1 tab(s) orally once a day  levETIRAcetam 1000 mg oral tablet: 1 tab(s) orally 2 times a day  Nephro-Jeanie oral tablet: 1 tab(s) orally once a day  Plavix 75 mg oral tablet: 1 tab(s) orally once a day  tamsulosin 0.4 mg oral capsule: 1 cap(s) orally once a day (at bedtime)  torsemide 10 mg oral tablet: 1 tab(s) orally every other day    ALLERGIES:  penicillin (Other)    MEDICATIONS:  STANDING MEDICATIONS  amLODIPine   Tablet 5 milliGRAM(s) Oral daily  aspirin enteric coated 81 milliGRAM(s) Oral daily  atorvastatin 40 milliGRAM(s) Oral at bedtime  carbamide peroxide Otic Solution 5 Drop(s) Right Ear two times a day  carvedilol 6.25 milliGRAM(s) Oral every 12 hours  chlorhexidine 2% Cloths 1 Application(s) Topical <User Schedule>  cloNIDine 0.1 milliGRAM(s) Oral two times a day  clopidogrel Tablet 75 milliGRAM(s) Oral daily  epoetin yolanda-epbx (RETACRIT) Injectable 74695 Unit(s) IV Push <User Schedule>  hydrALAZINE 50 milliGRAM(s) Oral two times a day  isosorbide   mononitrate ER Tablet (IMDUR) 30 milliGRAM(s) Oral daily  levETIRAcetam 1000 milliGRAM(s) Oral two times a day  multivitamin 1 Tablet(s) Oral daily  Nephro-jeanie 1 Tablet(s) Oral daily  sevelamer carbonate 800 milliGRAM(s) Oral three times a day with meals  tamsulosin 0.4 milliGRAM(s) Oral at bedtime  torsemide 10 milliGRAM(s) Oral <User Schedule>    PRN MEDICATIONS  acetaminophen     Tablet .. 650 milliGRAM(s) Oral every 6 hours PRN  aluminum hydroxide/magnesium hydroxide/simethicone Suspension 30 milliLiter(s) Oral every 4 hours PRN  melatonin 3 milliGRAM(s) Oral at bedtime PRN  metoprolol tartrate Injectable 5 milliGRAM(s) IV Push every 6 hours PRN  ondansetron Injectable 4 milliGRAM(s) IV Push every 8 hours PRN      VITAL SIGNS: Last 24 Hours  T(C): 36.9 (08 Nov 2024 05:12), Max: 37.2 (07 Nov 2024 14:01)  T(F): 98.4 (08 Nov 2024 05:12), Max: 98.9 (07 Nov 2024 14:01)  HR: 76 (08 Nov 2024 05:12) (76 - 91)  BP: 169/72 (08 Nov 2024 05:12) (117/66 - 169/72)  BP(mean): --  RR: 18 (08 Nov 2024 05:12) (18 - 18)  SpO2: 94% (08 Nov 2024 05:12) (94% - 100%)    LABS:                        8.0    6.92  )-----------( 108      ( 08 Nov 2024 05:35 )             25.1     11-08    138  |  100  |  32.3[H]  ----------------------------<  112[H]  3.8   |  25.0  |  5.18[H]    Ca    7.7[L]      08 Nov 2024 05:35  Phos  2.4     11-08  Mg     1.9     11-08        Urinalysis Basic - ( 08 Nov 2024 05:35 )    Color: x / Appearance: x / SG: x / pH: x  Gluc: 112 mg/dL / Ketone: x  / Bili: x / Urobili: x   Blood: x / Protein: x / Nitrite: x   Leuk Esterase: x / RBC: x / WBC x   Sq Epi: x / Non Sq Epi: x / Bacteria: x            Culture - Blood (collected 05 Nov 2024 17:12)  Source: .Blood BLOOD  Preliminary Report (07 Nov 2024 22:01):    No growth at 48 Hours    Culture - Urine (collected 05 Nov 2024 16:51)  Source: Catheterized Catheterized  Final Report (06 Nov 2024 18:59):    <10,000 CFU/mL Normal Urogenital Sivan          RADIOLOGY:  Reviewed     PHYSICAL EXAM:  General: NAD, AAOx3  HEENT: atraumatic, normocephalic  Pulmonary: clear to auscultation bilaterally; No wheeze  Cardiovascular: Regular rate and rhythm; no murmurs, rubs or gallops. Normal S1S2  Gastrointestinal: Soft, nontender, nondistended; bowel sounds present  Musculoskeletal: 2+ peripheral pulses, no clubbing, cyanosis or edema  Neurology: Pt. alert and oriented, fluent speech, able to move all extremities  Skin: no rashes or lesions

## 2024-11-08 NOTE — PROGRESS NOTE ADULT - ASSESSMENT
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  Pt has missed his last several dialysis sessions    ESRD: fluid overload, +PVC--> improved  Noncompliance issues contributory  - will arrange for HD tomorrow with UF as tolerates    Anemia: +CKD  - cont REINALDO at HD  - iron stores are adequate    RO:  - phos noted    Will follow

## 2024-11-08 NOTE — PROGRESS NOTE ADULT - ASSESSMENT
64M PMHx atrial fibrillation, ESRD, coronary artery disease with bypass surgery, aortic and mitral valve replacements aortic dissection, bowel ischemia with bowel resection who presented with dyspnea after missing the prior two hemodialysis sessions and missing his medications. Patient admitted for hypertensive urgency 2/2 volume overload 2/2 missed hemodialysis. Hemodialysis was resumed.      Dyspnea/hypertensive urgency 2/2 volume overload in setting of ESRD and missed hemodialysis  - Dialysis Tues/Thurs/Sat via permacath, has left AVF but does not use it  - BNP significantly elevated 2/2 missing hemodialysis sessions and poor compliance with medications  - Improved after hemodialysis, not requiring supplemental oxygen  - CT of the chest was without pulmonary embolus  - Last dialysis 11/7, 0 L removed   - Vascular consulted for evaluation of AVF patency   - Nephrology consulted, dialysis per nephro     Normocytic anemia 2/2 chronic disease and GIB  - 2 bright red bloody BMs yesterday  - Required 1 U PRBC, hemodynamically stable   - C/w EPO   - Prior colonoscopy 06/2024: Neovascular at the anastomoses (s/p partial colectomy). No AVM  - GI consulted, planning colonoscopy Monday   - Need to be on clear liquids day prior to the procedure  - Monitor H/H closely  - Maintain active type and screen, two large bore IVs  - Transfuse to maintain Hgb >7    Atrial fibrillation  - Noted to have a history of prior ablation and atrial appendage closure  - On carvedilol     Coronary artery disease / Hypertension  - On aspirin, clopidogrel, and atorvastatin  - Troponin elevation due to demand ischemia from fluid overload and chronic kidney disease  - Close blood pressure monitoring  - Continue on amlodipine, carvedilol, clonidine, hydralazine, and isosorbide    Aortic dissection  - CT chest and abdomen chronic aortic dissection  - Continue with antihypertensive medications    Enlarged mediastinal and right hilar nodes  - Noted on CT of the chest  - For outpatient follow up for repeat CT chest or PET/CT to monitor    Seizures  - On levetiracetam    Dispo: Colonoscopy Monday

## 2024-11-08 NOTE — PROGRESS NOTE ADULT - SUBJECTIVE AND OBJECTIVE BOX
NEPHROLOGY INTERVAL HPI/OVERNIGHT EVENTS:    Examined earlier  No distress  denies HA CP no SOB    MEDICATIONS  (STANDING):  amLODIPine   Tablet 5 milliGRAM(s) Oral daily  aspirin enteric coated 81 milliGRAM(s) Oral daily  atorvastatin 40 milliGRAM(s) Oral at bedtime  carbamide peroxide Otic Solution 5 Drop(s) Right Ear two times a day  carvedilol 6.25 milliGRAM(s) Oral every 12 hours  chlorhexidine 2% Cloths 1 Application(s) Topical <User Schedule>  cloNIDine 0.1 milliGRAM(s) Oral two times a day  clopidogrel Tablet 75 milliGRAM(s) Oral daily  epoetin yolanda-epbx (RETACRIT) Injectable 38065 Unit(s) IV Push <User Schedule>  hydrALAZINE 50 milliGRAM(s) Oral two times a day  isosorbide   mononitrate ER Tablet (IMDUR) 30 milliGRAM(s) Oral daily  levETIRAcetam 1000 milliGRAM(s) Oral two times a day  multivitamin 1 Tablet(s) Oral daily  Nephro-jeanie 1 Tablet(s) Oral daily  sevelamer carbonate 800 milliGRAM(s) Oral three times a day with meals  tamsulosin 0.4 milliGRAM(s) Oral at bedtime  torsemide 10 milliGRAM(s) Oral <User Schedule>    MEDICATIONS  (PRN):  acetaminophen     Tablet .. 650 milliGRAM(s) Oral every 6 hours PRN Temp greater or equal to 38C (100.4F), Mild Pain (1 - 3)  aluminum hydroxide/magnesium hydroxide/simethicone Suspension 30 milliLiter(s) Oral every 4 hours PRN Dyspepsia  melatonin 3 milliGRAM(s) Oral at bedtime PRN Insomnia  metoprolol tartrate Injectable 5 milliGRAM(s) IV Push every 6 hours PRN HR sustaining > 120  ondansetron Injectable 4 milliGRAM(s) IV Push every 8 hours PRN Nausea and/or Vomiting      Allergies    penicillin (Other)    Intolerances        Vital Signs Last 24 Hrs  T(C): 37.2 (2024 08:49), Max: 37.2 (2024 08:49)  T(F): 99 (2024 08:49), Max: 99 (2024 08:49)  HR: 65 (2024 11:33) (65 - 91)  BP: 151/68 (2024 11:33) (138/67 - 169/72)  BP(mean): --  RR: 18 (2024 08:49) (18 - 18)  SpO2: 99% (2024 08:49) (94% - 99%)    Parameters below as of 2024 08:49  Patient On (Oxygen Delivery Method): room air      Daily     Daily Weight in k.6 (2024 16:00)    PHYSICAL EXAM:  GENERAL: Appears chronically ill  HEAD: Atraumatic, Normocephalic  NECK: Supple, No JVD  NERVOUS SYSTEM:  Alert & Oriented X3  CHEST/LUNG: Clear, diminished BS  HEART: No rub  ABDOMEN: Soft, Nontender, +BS  EXTREMITIES:  + dependent edema trace    LABS:                        8.0    6.92  )-----------( 108      ( 2024 05:35 )             25.1         138  |  100  |  32.3[H]  ----------------------------<  112[H]  3.8   |  25.0  |  5.18[H]    Ca    7.7[L]      2024 05:35  Phos  2.4       Mg     1.9             Urinalysis Basic - ( 2024 05:35 )    Color: x / Appearance: x / SG: x / pH: x  Gluc: 112 mg/dL / Ketone: x  / Bili: x / Urobili: x   Blood: x / Protein: x / Nitrite: x   Leuk Esterase: x / RBC: x / WBC x   Sq Epi: x / Non Sq Epi: x / Bacteria: x      Magnesium: 1.9 mg/dL ( @ 05:35)  Phosphorus: 2.4 mg/dL ( @ 05:35)          RADIOLOGY & ADDITIONAL TESTS:

## 2024-11-09 LAB
ALLERGY+IMMUNOLOGY DIAG STUDY NOTE: SIGNIFICANT CHANGE UP
ANION GAP SERPL CALC-SCNC: 12 MMOL/L — SIGNIFICANT CHANGE UP (ref 5–17)
BLD GP AB SCN SERPL QL: SIGNIFICANT CHANGE UP
BUN SERPL-MCNC: 38.7 MG/DL — HIGH (ref 8–20)
CALCIUM SERPL-MCNC: 7.7 MG/DL — LOW (ref 8.4–10.5)
CHLORIDE SERPL-SCNC: 99 MMOL/L — SIGNIFICANT CHANGE UP (ref 96–108)
CO2 SERPL-SCNC: 24 MMOL/L — SIGNIFICANT CHANGE UP (ref 22–29)
CREAT SERPL-MCNC: 6.34 MG/DL — HIGH (ref 0.5–1.3)
DIR ANTIGLOB POLYSPECIFIC INTERPRETATION: SIGNIFICANT CHANGE UP
EGFR: 9 ML/MIN/1.73M2 — LOW
GLUCOSE SERPL-MCNC: 100 MG/DL — HIGH (ref 70–99)
HCT VFR BLD CALC: 24.2 % — LOW (ref 39–50)
HGB BLD-MCNC: 7.6 G/DL — LOW (ref 13–17)
INR BLD: 1.29 RATIO — HIGH (ref 0.85–1.16)
MAGNESIUM SERPL-MCNC: 2 MG/DL — SIGNIFICANT CHANGE UP (ref 1.6–2.6)
MCHC RBC-ENTMCNC: 29.8 PG — SIGNIFICANT CHANGE UP (ref 27–34)
MCHC RBC-ENTMCNC: 31.4 G/DL — LOW (ref 32–36)
MCV RBC AUTO: 94.9 FL — SIGNIFICANT CHANGE UP (ref 80–100)
PHOSPHATE SERPL-MCNC: 2.1 MG/DL — LOW (ref 2.4–4.7)
PLATELET # BLD AUTO: 132 K/UL — LOW (ref 150–400)
POTASSIUM SERPL-MCNC: 3.6 MMOL/L — SIGNIFICANT CHANGE UP (ref 3.5–5.3)
POTASSIUM SERPL-SCNC: 3.6 MMOL/L — SIGNIFICANT CHANGE UP (ref 3.5–5.3)
PROTHROM AB SERPL-ACNC: 14.9 SEC — HIGH (ref 9.9–13.4)
RBC # BLD: 2.55 M/UL — LOW (ref 4.2–5.8)
RBC # FLD: 14.7 % — HIGH (ref 10.3–14.5)
SODIUM SERPL-SCNC: 135 MMOL/L — SIGNIFICANT CHANGE UP (ref 135–145)
WBC # BLD: 8.33 K/UL — SIGNIFICANT CHANGE UP (ref 3.8–10.5)
WBC # FLD AUTO: 8.33 K/UL — SIGNIFICANT CHANGE UP (ref 3.8–10.5)

## 2024-11-09 PROCEDURE — 99232 SBSQ HOSP IP/OBS MODERATE 35: CPT

## 2024-11-09 PROCEDURE — 86077 PHYS BLOOD BANK SERV XMATCH: CPT

## 2024-11-09 RX ORDER — ALPRAZOLAM 0.25 MG
0.5 TABLET ORAL EVERY 8 HOURS
Refills: 0 | Status: DISCONTINUED | OUTPATIENT
Start: 2024-11-09 | End: 2024-11-12

## 2024-11-09 RX ADMIN — LEVETIRACETAM 1000 MILLIGRAM(S): 500 TABLET, FILM COATED ORAL at 09:09

## 2024-11-09 RX ADMIN — CARVEDILOL 6.25 MILLIGRAM(S): 25 TABLET, FILM COATED ORAL at 09:10

## 2024-11-09 RX ADMIN — ISOSORBIDE MONONITRATE 30 MILLIGRAM(S): 60 TABLET, EXTENDED RELEASE ORAL at 17:25

## 2024-11-09 RX ADMIN — SEVELAMER CARBONATE 800 MILLIGRAM(S): 800 TABLET, FILM COATED ORAL at 17:27

## 2024-11-09 RX ADMIN — Medication 3 MILLIGRAM(S): at 01:04

## 2024-11-09 RX ADMIN — LEVETIRACETAM 1000 MILLIGRAM(S): 500 TABLET, FILM COATED ORAL at 17:23

## 2024-11-09 RX ADMIN — CARVEDILOL 6.25 MILLIGRAM(S): 25 TABLET, FILM COATED ORAL at 17:24

## 2024-11-09 RX ADMIN — Medication 1 TABLET(S): at 17:24

## 2024-11-09 RX ADMIN — Medication 0.4 MILLIGRAM(S): at 21:32

## 2024-11-09 RX ADMIN — CLONIDINE HYDROCHLORIDE 0.1 MILLIGRAM(S): 0.2 TABLET ORAL at 09:09

## 2024-11-09 RX ADMIN — Medication 0.5 MILLIGRAM(S): at 18:23

## 2024-11-09 RX ADMIN — SEVELAMER CARBONATE 800 MILLIGRAM(S): 800 TABLET, FILM COATED ORAL at 09:09

## 2024-11-09 RX ADMIN — HYDRALAZINE HYDROCHLORIDE 50 MILLIGRAM(S): 50 TABLET, FILM COATED ORAL at 17:25

## 2024-11-09 RX ADMIN — CLOPIDOGREL 75 MILLIGRAM(S): 75 TABLET ORAL at 17:25

## 2024-11-09 RX ADMIN — Medication 5 MILLIGRAM(S): at 09:10

## 2024-11-09 RX ADMIN — CLONIDINE HYDROCHLORIDE 0.1 MILLIGRAM(S): 0.2 TABLET ORAL at 17:26

## 2024-11-09 RX ADMIN — Medication 81 MILLIGRAM(S): at 17:25

## 2024-11-09 RX ADMIN — Medication 1 TABLET(S): at 17:27

## 2024-11-09 RX ADMIN — Medication 5 MILLIGRAM(S): at 18:23

## 2024-11-09 RX ADMIN — ERYTHROPOIETIN 10000 UNIT(S): 10000 INJECTION, SOLUTION INTRAVENOUS; SUBCUTANEOUS at 11:46

## 2024-11-09 RX ADMIN — TORSEMIDE 10 MILLIGRAM(S): 100 TABLET ORAL at 09:09

## 2024-11-09 RX ADMIN — Medication 40 MILLIGRAM(S): at 21:33

## 2024-11-09 RX ADMIN — HYDRALAZINE HYDROCHLORIDE 50 MILLIGRAM(S): 50 TABLET, FILM COATED ORAL at 09:10

## 2024-11-09 NOTE — PROGRESS NOTE ADULT - SUBJECTIVE AND OBJECTIVE BOX
Chief Complaint:  Patient is a 64y old  Male who presents with a chief complaint of ZUNIGA  Anemia (07 Nov 2024 18:01)      HPI/ 24 hr events: Patient seen and examined at bedside. Pt feeling well this morning. Tolerating diet. States he is having bleeding with every BM. Denies nausea, vomiting, diarrhea, abdominal pain. Vitals are overall stable, no leukocytosis, hgb with down trend to 7.6.      REVIEW OF SYSTEMS:   General: Negative  HEENT: Negative  CV: Negative  Respiratory: Negative  GI: See HPI  : Negative  MSK: Negative  Hematologic: Negative  Skin: Negative    MEDICATIONS:   MEDICATIONS  (STANDING):  amLODIPine   Tablet 5 milliGRAM(s) Oral daily  aspirin enteric coated 81 milliGRAM(s) Oral daily  atorvastatin 40 milliGRAM(s) Oral at bedtime  carbamide peroxide Otic Solution 5 Drop(s) Right Ear two times a day  carvedilol 6.25 milliGRAM(s) Oral every 12 hours  chlorhexidine 2% Cloths 1 Application(s) Topical <User Schedule>  cloNIDine 0.1 milliGRAM(s) Oral two times a day  clopidogrel Tablet 75 milliGRAM(s) Oral daily  epoetin yolanda-epbx (RETACRIT) Injectable 67466 Unit(s) IV Push <User Schedule>  hydrALAZINE 50 milliGRAM(s) Oral two times a day  isosorbide   mononitrate ER Tablet (IMDUR) 30 milliGRAM(s) Oral daily  levETIRAcetam 1000 milliGRAM(s) Oral two times a day  multivitamin 1 Tablet(s) Oral daily  Nephro-jeanie 1 Tablet(s) Oral daily  sevelamer carbonate 800 milliGRAM(s) Oral three times a day with meals  tamsulosin 0.4 milliGRAM(s) Oral at bedtime  torsemide 10 milliGRAM(s) Oral <User Schedule>    MEDICATIONS  (PRN):  acetaminophen     Tablet .. 650 milliGRAM(s) Oral every 6 hours PRN Temp greater or equal to 38C (100.4F), Mild Pain (1 - 3)  aluminum hydroxide/magnesium hydroxide/simethicone Suspension 30 milliLiter(s) Oral every 4 hours PRN Dyspepsia  melatonin 3 milliGRAM(s) Oral at bedtime PRN Insomnia  metoprolol tartrate Injectable 5 milliGRAM(s) IV Push every 6 hours PRN HR sustaining > 120  ondansetron Injectable 4 milliGRAM(s) IV Push every 8 hours PRN Nausea and/or Vomiting      Packed Red Cells Order:  1 Unit  Indication: Symptomatic Anemia - e.g. Chest Pain, Orthostasis, Tach  Infuse Unit : 3 Hours (11-06-24 @ 07:51)  Packed Red Cells Order:  1 Unit  Indication: Symptomatic Anemia - e.g. Chest Pain, Orthostasis, Tach  Infuse Unit : 30 Minutes --- transfuse during HD (11-06-24 @ 07:33)        DIET:  Diet, Regular:   DASH/TLC Sodium & Cholesterol Restricted (DASH)  For patients receiving Renal Replacement - No Protein Restr, No Conc K, No Conc Phos, Low  Sodium (RENAL) (11-05-24 @ 04:35) [Active]          ALLERGIES:   Allergies    penicillin (Other)    Intolerances        VITAL SIGNS:   Vital Signs Last 24 Hrs  T(C): 36.3 (09 Nov 2024 08:20), Max: 37.1 (08 Nov 2024 20:55)  T(F): 97.3 (09 Nov 2024 08:20), Max: 98.7 (08 Nov 2024 20:55)  HR: 73 (09 Nov 2024 08:20) (65 - 87)  BP: 151/89 (09 Nov 2024 08:20) (146/76 - 157/71)  BP(mean): --  RR: 18 (09 Nov 2024 08:20) (18 - 18)  SpO2: 94% (09 Nov 2024 08:20) (94% - 97%)    Parameters below as of 09 Nov 2024 08:20  Patient On (Oxygen Delivery Method): room air      I&O's Summary    08 Nov 2024 07:01  -  09 Nov 2024 07:00  --------------------------------------------------------  IN: 1500 mL / OUT: 0 mL / NET: 1500 mL        PHYSICAL EXAM:   GENERAL:  No acute distress  HEENT:  NC/AT, conjunctiva clear, sclera anicteric  CHEST:  No increased effort  HEART:  Regular rate  ABDOMEN:  Soft, non-tender, non-distended, normoactive bowel sounds, no rebound or guarding  EXTREMITIES: No edema  SKIN:  Warm, dry  NEURO:  Calm, cooperative    LABS:                        7.6    8.33  )-----------( 132      ( 09 Nov 2024 05:54 )             24.2     Hemoglobin: 7.6 g/dL (11-09-24 @ 05:54)  Hemoglobin: 8.0 g/dL (11-08-24 @ 05:35)  Hemoglobin: 8.5 g/dL (11-07-24 @ 05:45)    11-09    135  |  99  |  38.7[H]  ----------------------------<  100[H]  3.6   |  24.0  |  6.34[H]    Ca    7.7[L]      09 Nov 2024 05:54  Phos  2.1     11-09  Mg     2.0     11-09          PT/INR - ( 09 Nov 2024 05:54 )   PT: 14.9 sec;   INR: 1.29 ratio                                                 RADIOLOGY & ADDITIONAL STUDIES:                 Chief Complaint:  Patient is a 64y old  Male who presents with a chief complaint of ZUNIGA  Anemia (07 Nov 2024 18:01)      HPI/ 24 hr events: Patient seen and examined at bedside. Pt feeling well this morning. Tolerating diet. States he is having bleeding with every BM. Denies nausea, vomiting, diarrhea, abdominal pain. Vitals are overall stable, no leukocytosis, hgb with down trend to 7.6.      REVIEW OF SYSTEMS:   General: Negative  HEENT: Negative  CV: Negative  Respiratory: Negative  GI: See HPI  : Negative  MSK: Negative  Hematologic: Negative  Skin: Negative    MEDICATIONS:   MEDICATIONS  (STANDING):  amLODIPine   Tablet 5 milliGRAM(s) Oral daily  aspirin enteric coated 81 milliGRAM(s) Oral daily  atorvastatin 40 milliGRAM(s) Oral at bedtime  carbamide peroxide Otic Solution 5 Drop(s) Right Ear two times a day  carvedilol 6.25 milliGRAM(s) Oral every 12 hours  chlorhexidine 2% Cloths 1 Application(s) Topical <User Schedule>  cloNIDine 0.1 milliGRAM(s) Oral two times a day  clopidogrel Tablet 75 milliGRAM(s) Oral daily  epoetin yolanda-epbx (RETACRIT) Injectable 43707 Unit(s) IV Push <User Schedule>  hydrALAZINE 50 milliGRAM(s) Oral two times a day  isosorbide   mononitrate ER Tablet (IMDUR) 30 milliGRAM(s) Oral daily  levETIRAcetam 1000 milliGRAM(s) Oral two times a day  multivitamin 1 Tablet(s) Oral daily  Nephro-jeanie 1 Tablet(s) Oral daily  sevelamer carbonate 800 milliGRAM(s) Oral three times a day with meals  tamsulosin 0.4 milliGRAM(s) Oral at bedtime  torsemide 10 milliGRAM(s) Oral <User Schedule>    MEDICATIONS  (PRN):  acetaminophen     Tablet .. 650 milliGRAM(s) Oral every 6 hours PRN Temp greater or equal to 38C (100.4F), Mild Pain (1 - 3)  aluminum hydroxide/magnesium hydroxide/simethicone Suspension 30 milliLiter(s) Oral every 4 hours PRN Dyspepsia  melatonin 3 milliGRAM(s) Oral at bedtime PRN Insomnia  metoprolol tartrate Injectable 5 milliGRAM(s) IV Push every 6 hours PRN HR sustaining > 120  ondansetron Injectable 4 milliGRAM(s) IV Push every 8 hours PRN Nausea and/or Vomiting      Packed Red Cells Order:  1 Unit  Indication: Symptomatic Anemia - e.g. Chest Pain, Orthostasis, Tach  Infuse Unit : 3 Hours (11-06-24 @ 07:51)  Packed Red Cells Order:  1 Unit  Indication: Symptomatic Anemia - e.g. Chest Pain, Orthostasis, Tach  Infuse Unit : 30 Minutes --- transfuse during HD (11-06-24 @ 07:33)        DIET:  Diet, Regular:   DASH/TLC Sodium & Cholesterol Restricted (DASH)  For patients receiving Renal Replacement - No Protein Restr, No Conc K, No Conc Phos, Low  Sodium (RENAL) (11-05-24 @ 04:35) [Active]          ALLERGIES:   Allergies    penicillin (Other)    Intolerances        VITAL SIGNS:   Vital Signs Last 24 Hrs  T(C): 36.3 (09 Nov 2024 08:20), Max: 37.1 (08 Nov 2024 20:55)  T(F): 97.3 (09 Nov 2024 08:20), Max: 98.7 (08 Nov 2024 20:55)  HR: 73 (09 Nov 2024 08:20) (65 - 87)  BP: 151/89 (09 Nov 2024 08:20) (146/76 - 157/71)  BP(mean): --  RR: 18 (09 Nov 2024 08:20) (18 - 18)  SpO2: 94% (09 Nov 2024 08:20) (94% - 97%)    Parameters below as of 09 Nov 2024 08:20  Patient On (Oxygen Delivery Method): room air      I&O's Summary    08 Nov 2024 07:01  -  09 Nov 2024 07:00  --------------------------------------------------------  IN: 1500 mL / OUT: 0 mL / NET: 1500 mL        PHYSICAL EXAM:   GENERAL:  No acute distress  HEENT:  NC/AT, conjunctiva clear, sclera anicteric  CHEST:  No increased effort  HEART:  Regular rate  ABDOMEN:  Soft, non-tender, non-distended, normoactive bowel sounds, no rebound or guarding  EXTREMITIES: No edema  SKIN:  Warm, dry  NEURO:  Calm, cooperative    LABS:                        7.6    8.33  )-----------( 132      ( 09 Nov 2024 05:54 )             24.2     Hemoglobin: 7.6 g/dL (11-09-24 @ 05:54)  Hemoglobin: 8.0 g/dL (11-08-24 @ 05:35)  Hemoglobin: 8.5 g/dL (11-07-24 @ 05:45)    11-09    135  |  99  |  38.7[H]  ----------------------------<  100[H]  3.6   |  24.0  |  6.34[H]    Ca    7.7[L]      09 Nov 2024 05:54  Phos  2.1     11-09  Mg     2.0     11-09          PT/INR - ( 09 Nov 2024 05:54 )   PT: 14.9 sec;   INR: 1.29 ratio                                                 RADIOLOGY & ADDITIONAL STUDIES:< from: CT Angio Chest Aorta w/wo IV Cont (11.05.24 @ 02:53) >  ACC: [id number] EXAM:  CT ANGIO ABD PELV (W)AW IC   ORDERED BY: ANASTACIA AVILEZ     ACC: [id number] EXAM:  CT ANGIO CHEST AORTA WAWIC   ORDERED BY: ANASTACIA AVILEZ     *** ADDENDUM # 1 ***    Known right renal lesion previously described as suspicious for low-grade   neoplasm on 10/4/2023 CT is not well evaluated on this arterial phase   exam.    --- End of Report ---    *** END OF ADDENDUM # 1 ***      PROCEDURE DATE:  11/05/2024          INTERPRETATION:  CLINICAL INFORMATION: Aortic dissection. Shortness of   breath, missed hemodialysis.    COMPARISON: 10/4/2023 abdominal CTA, 5/22/2023 chest CT    CONTRAST/COMPLICATIONS:  IV Contrast: IV contrast documented in unlinked concurrent exam   (accession [id number]), Omnipaque 350 (accession [id number])90 cc   administered   10 cc discarded  Oral Contrast: NONE  Complications: None reported at time of study completion    PROCEDURE:  CT Angiography of the Chest, Abdomen and Pelvis.  Precontrast imaging was performed through the chest followed by arterial   phase imaging of the chest, abdomen and pelvis.  Sagittal and coronal reformats were performed as well as 3D (MIP)   reconstructions.    FINDINGS:    Aorta/vessels: Aortic and coronary atherosclerotic calcifications. Status   post aortic root repair and mitral valve repair. Left atrial appendage   occlusion device. No intramural hematoma. Chronic aortic dissection again   noted extending from the aortic arch through the abdominal aorta into the   right external iliac artery. Dissection flap again noted extending into   the right brachiocephalic artery, left carotid artery and bilateral renal   arteries. True lumen fills with contrast throughout the chest and   abdomen. False lumen partially fills with contrast. Grossly stable   dilatation of the ascending aorta. Increased size of the descending   thoracic aorta, now measuring up to 4.1 cm, previously 3.9 cm. Stable   aneurysmal dilatation of the right common iliac artery.    CHEST:  LUNGS AND LARGE AIRWAYS: Trace layering secretions in the superior   trachea. Diffuse patchy bilateral ground glass haziness. Mild bibasilar   atelectasis.  PLEURA: No pleural effusion.  HEART: Heart size is normal. No pericardial effusion.  MEDIASTINUM AND KAYLA: Enlarged mediastinal lymph nodes including 1.5 cm   precarinal node and 2.4 cm subcarinal node. Enlarged right hilar nodes.   CHEST WALL AND LOWER NECK: Median sternotomy. Right chest wall catheter   with tip in the SVC.    ABDOMEN AND PELVIS:  LIVER: Within normal limits.  BILE DUCTS: Normal caliber.  GALLBLADDER: Cholelithiasis. Distended gallbladder.  SPLEEN: Within normal limits.  PANCREAS: Within normal limits.  ADRENALS: Within normal limits.  KIDNEYS/URETERS: Bilateral renal cysts. No hydronephrosis.    BLADDER: Within normallimits.  REPRODUCTIVE ORGANS: Enlarged prostate.    BOWEL: No bowel obstruction. Status post partial colectomy.  PERITONEUM/RETROPERITONEUM: Within normal limits.  VESSELS: Within normal limits.  LYMPH NODES: No lymphadenopathy.  ABDOMINAL WALL: Within normal limits.  BONES: Degenerative changes.    IMPRESSION:  Chronic aortic dissection again noted involving the thoracic and   abdominal aorta as described above. Mild interval increase in size of the   descending thoracic aorta.    Mild diffuse groundglass haziness in the lungs is nonspecific, could   reflect edema or infectious/inflammatory process.    Indeterminate enlarged mediastinal and right hilar nodes, increased/new   since prior exam. Recommend clinical correlation and follow-up chest CT   and/or PET CT as warranted.        --- End of Report ---    ***Please see the addendum at the top of this report. It may contain   additional important information or changes.****        DEEPAK CROCKER MD; Attending Radiologist  This document has been electronically signed. Nov 5 2024  3:28AM  1st Addendum: DEEPAK CROCKER MD; Attending Radiologist  The first addendum was electronically signed on: Nov 5 2024  3:38AM.    < end of copied text >

## 2024-11-09 NOTE — PROGRESS NOTE ADULT - ASSESSMENT
65 yo male with PMH of paroxysmal atrial fibrillation/flutter (s/p ablation, s/p watchman), BPH, seizure disorder, HTN, HLD, CAD s/p CABG x 2, AVR, MVR 11/2020 with Dr. 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 GI consulted after rectal bleeding.     #Rectal bleeding  #CAD s/p CABG x2, AVR, MVR  #aortic dissection; chronic   Prior colonoscopy 06/2024: Neovascular at the anastomoses (s/p partial colectomy). No AVM  -Cont to trend hemoglobin, transfuse as needed, monitor for bleeding   -Will plan for colonoscopy Monday, 11/11/24  -CLD tomorrow  -NPO after MN Sunday, 11/10/24  -Maintain current  T&S, INR <1.5, Hgb >7.0, Plt >50 prior to procedure  -Will need vascular and cardiovascular clearance prior to procedure   Further care per primary team   _________________________________________________________________  Assessment and recommendations are final when note is signed by the attending physician.

## 2024-11-09 NOTE — PROGRESS NOTE ADULT - SUBJECTIVE AND OBJECTIVE BOX
JONATHAN GIBSON 64y Male  MRN#: [id number]       SUBJECTIVE  Patient is a 64y old Male who presents with a chief complaint of ZUNIGA  Anemia (07 Nov 2024 18:01)  Currently admitted to medicine with the primary diagnosis of Acute dyspnea    Today is hospital day 4d, and this morning he is _________ and reports no overnight events.     OBJECTIVE  PAST MEDICAL & SURGICAL HISTORY  CHF (congestive heart failure)    CVA (cerebral vascular accident)    Seizures  last seizure 10 years ago    HTN (hypertension)    Hyperlipemia    COVID-19  october 2020    Atrial fibrillation    Former smoker    History of cocaine use  remote hx, currently sober    H/O aortic dissection  s/p repair (2010), surgery complicated by bowel ischemia s/p bowel resection and ostomy with reversal    H/O aortic valve insufficiency    Mitral regurgitation    GIB (gastrointestinal bleeding)    CAD (coronary artery disease)  s/p PCI 1998  and CABG x 2 11/2020    Chronic atrial fibrillation    Aneurysm of artery of upper extremity    Anemia of chronic disease    End stage renal disease    Myocardial infarction    COVID-19 vaccine series completed    Port-A-Cath in place    H/O colectomy    Status post double vessel coronary artery bypass  11/2020 Dr Butler    S/P AVR (aortic valve replacement)  (t)AVR 11/2020 Dr Butler    S/P MVR (mitral valve replacement)  (t)MVR 11/2020 Dr Butler    H/O aortic dissection  Type A; emergent repair 2010    AV fistula  LUE    History of renal transplant    Presence of Watchman left atrial appendage closure device      Home Meds:  amLODIPine 10 mg oral tablet: 1 tab(s) orally once a day  Aspi-Cor 81 mg oral delayed release tablet: 1 tab(s) orally once a day  atorvastatin 40 mg oral tablet: 1 tab(s) orally once a day (at bedtime)  carvedilol 6.25 mg oral tablet: 1 tab(s) orally every 12 hours  cloNIDine 0.2 mg oral tablet: 1 tab(s) orally 2 times a day  hydrALAZINE 50 mg oral tablet: 1 tab(s) orally 2 times a day  isosorbide mononitrate 30 mg oral tablet, extended release: 1 tab(s) orally once a day  levETIRAcetam 1000 mg oral tablet: 1 tab(s) orally 2 times a day  Nephro-Jeanie oral tablet: 1 tab(s) orally once a day  Plavix 75 mg oral tablet: 1 tab(s) orally once a day  tamsulosin 0.4 mg oral capsule: 1 cap(s) orally once a day (at bedtime)  torsemide 10 mg oral tablet: 1 tab(s) orally every other day    ALLERGIES:  penicillin (Other)    MEDICATIONS:  STANDING MEDICATIONS  amLODIPine   Tablet 5 milliGRAM(s) Oral daily  aspirin enteric coated 81 milliGRAM(s) Oral daily  atorvastatin 40 milliGRAM(s) Oral at bedtime  carbamide peroxide Otic Solution 5 Drop(s) Right Ear two times a day  carvedilol 6.25 milliGRAM(s) Oral every 12 hours  chlorhexidine 2% Cloths 1 Application(s) Topical <User Schedule>  cloNIDine 0.1 milliGRAM(s) Oral two times a day  clopidogrel Tablet 75 milliGRAM(s) Oral daily  epoetin yolanda-epbx (RETACRIT) Injectable 86445 Unit(s) IV Push <User Schedule>  hydrALAZINE 50 milliGRAM(s) Oral two times a day  isosorbide   mononitrate ER Tablet (IMDUR) 30 milliGRAM(s) Oral daily  levETIRAcetam 1000 milliGRAM(s) Oral two times a day  multivitamin 1 Tablet(s) Oral daily  Nephro-jeanie 1 Tablet(s) Oral daily  sevelamer carbonate 800 milliGRAM(s) Oral three times a day with meals  tamsulosin 0.4 milliGRAM(s) Oral at bedtime  torsemide 10 milliGRAM(s) Oral <User Schedule>    PRN MEDICATIONS  acetaminophen     Tablet .. 650 milliGRAM(s) Oral every 6 hours PRN  aluminum hydroxide/magnesium hydroxide/simethicone Suspension 30 milliLiter(s) Oral every 4 hours PRN  melatonin 3 milliGRAM(s) Oral at bedtime PRN  metoprolol tartrate Injectable 5 milliGRAM(s) IV Push every 6 hours PRN  ondansetron Injectable 4 milliGRAM(s) IV Push every 8 hours PRN      VITAL SIGNS: Last 24 Hours  T(C): 36.6 (09 Nov 2024 04:42), Max: 37.2 (08 Nov 2024 08:49)  T(F): 97.9 (09 Nov 2024 04:42), Max: 99 (08 Nov 2024 08:49)  HR: 80 (09 Nov 2024 04:42) (65 - 87)  BP: 157/71 (09 Nov 2024 04:42) (138/67 - 157/71)  BP(mean): --  RR: 18 (09 Nov 2024 04:42) (18 - 18)  SpO2: 97% (09 Nov 2024 04:42) (96% - 99%)    LABS:                        7.6    8.33  )-----------( 132      ( 09 Nov 2024 05:54 )             24.2     11-09    135  |  99  |  38.7[H]  ----------------------------<  100[H]  3.6   |  24.0  |  6.34[H]    Ca    7.7[L]      09 Nov 2024 05:54  Phos  2.4     11-08  Mg     1.9     11-08      PT/INR - ( 09 Nov 2024 05:54 )   PT: 14.9 sec;   INR: 1.29 ratio          RADIOLOGY:  Reviewed     PHYSICAL EXAM:  General: NAD, AAOx3  HEENT: atraumatic, normocephalic  Pulmonary: clear to auscultation bilaterally; No wheeze  Cardiovascular: Regular rate and rhythm; no murmurs, rubs or gallops. Normal S1S2  Gastrointestinal: Soft, nontender, nondistended; bowel sounds present  Musculoskeletal: 2+ peripheral pulses, no clubbing, cyanosis or edema  Neurology: Pt. alert and oriented, fluent speech, able to move all extremities  Skin: no rashes or lesions             JONATHAN GIBSON 64y Male  MRN#: [id number]       SUBJECTIVE  Patient is a 64y old Male who presents with a chief complaint of ZUNIGA  Anemia (07 Nov 2024 18:01)  Currently admitted to medicine with the primary diagnosis of Acute dyspnea    Today is hospital day 4d, and this morning he is sitting across from his roommate chatting, no complaints, no bloody BM this morning says he has not had one in few days and reports no overnight events.     OBJECTIVE  PAST MEDICAL & SURGICAL HISTORY  CHF (congestive heart failure)    CVA (cerebral vascular accident)    Seizures  last seizure 10 years ago    HTN (hypertension)    Hyperlipemia    COVID-19 october 2020    Atrial fibrillation    Former smoker    History of cocaine use  remote hx, currently sober    H/O aortic dissection  s/p repair (2010), surgery complicated by bowel ischemia s/p bowel resection and ostomy with reversal    H/O aortic valve insufficiency    Mitral regurgitation    GIB (gastrointestinal bleeding)    CAD (coronary artery disease)  s/p PCI 1998  and CABG x 2 11/2020    Chronic atrial fibrillation    Aneurysm of artery of upper extremity    Anemia of chronic disease    End stage renal disease    Myocardial infarction    COVID-19 vaccine series completed    Port-A-Cath in place    H/O colectomy    Status post double vessel coronary artery bypass  11/2020 Dr Butler    S/P AVR (aortic valve replacement)  (t)AVR 11/2020 Dr Butler    S/P MVR (mitral valve replacement)  (t)MVR 11/2020 Dr Butler    H/O aortic dissection  Type A; emergent repair 2010    AV fistula  LUE    History of renal transplant    Presence of Watchman left atrial appendage closure device      Home Meds:  amLODIPine 10 mg oral tablet: 1 tab(s) orally once a day  Aspi-Cor 81 mg oral delayed release tablet: 1 tab(s) orally once a day  atorvastatin 40 mg oral tablet: 1 tab(s) orally once a day (at bedtime)  carvedilol 6.25 mg oral tablet: 1 tab(s) orally every 12 hours  cloNIDine 0.2 mg oral tablet: 1 tab(s) orally 2 times a day  hydrALAZINE 50 mg oral tablet: 1 tab(s) orally 2 times a day  isosorbide mononitrate 30 mg oral tablet, extended release: 1 tab(s) orally once a day  levETIRAcetam 1000 mg oral tablet: 1 tab(s) orally 2 times a day  Nephro-Jeanie oral tablet: 1 tab(s) orally once a day  Plavix 75 mg oral tablet: 1 tab(s) orally once a day  tamsulosin 0.4 mg oral capsule: 1 cap(s) orally once a day (at bedtime)  torsemide 10 mg oral tablet: 1 tab(s) orally every other day    ALLERGIES:  penicillin (Other)    MEDICATIONS:  STANDING MEDICATIONS  amLODIPine   Tablet 5 milliGRAM(s) Oral daily  aspirin enteric coated 81 milliGRAM(s) Oral daily  atorvastatin 40 milliGRAM(s) Oral at bedtime  carbamide peroxide Otic Solution 5 Drop(s) Right Ear two times a day  carvedilol 6.25 milliGRAM(s) Oral every 12 hours  chlorhexidine 2% Cloths 1 Application(s) Topical <User Schedule>  cloNIDine 0.1 milliGRAM(s) Oral two times a day  clopidogrel Tablet 75 milliGRAM(s) Oral daily  epoetin yolanda-epbx (RETACRIT) Injectable 45115 Unit(s) IV Push <User Schedule>  hydrALAZINE 50 milliGRAM(s) Oral two times a day  isosorbide   mononitrate ER Tablet (IMDUR) 30 milliGRAM(s) Oral daily  levETIRAcetam 1000 milliGRAM(s) Oral two times a day  multivitamin 1 Tablet(s) Oral daily  Nephro-jeanie 1 Tablet(s) Oral daily  sevelamer carbonate 800 milliGRAM(s) Oral three times a day with meals  tamsulosin 0.4 milliGRAM(s) Oral at bedtime  torsemide 10 milliGRAM(s) Oral <User Schedule>    PRN MEDICATIONS  acetaminophen     Tablet .. 650 milliGRAM(s) Oral every 6 hours PRN  aluminum hydroxide/magnesium hydroxide/simethicone Suspension 30 milliLiter(s) Oral every 4 hours PRN  melatonin 3 milliGRAM(s) Oral at bedtime PRN  metoprolol tartrate Injectable 5 milliGRAM(s) IV Push every 6 hours PRN  ondansetron Injectable 4 milliGRAM(s) IV Push every 8 hours PRN      VITAL SIGNS: Last 24 Hours  T(C): 36.6 (09 Nov 2024 04:42), Max: 37.2 (08 Nov 2024 08:49)  T(F): 97.9 (09 Nov 2024 04:42), Max: 99 (08 Nov 2024 08:49)  HR: 80 (09 Nov 2024 04:42) (65 - 87)  BP: 157/71 (09 Nov 2024 04:42) (138/67 - 157/71)  BP(mean): --  RR: 18 (09 Nov 2024 04:42) (18 - 18)  SpO2: 97% (09 Nov 2024 04:42) (96% - 99%)    LABS:                        7.6    8.33  )-----------( 132      ( 09 Nov 2024 05:54 )             24.2     11-09    135  |  99  |  38.7[H]  ----------------------------<  100[H]  3.6   |  24.0  |  6.34[H]    Ca    7.7[L]      09 Nov 2024 05:54  Phos  2.4     11-08  Mg     1.9     11-08      PT/INR - ( 09 Nov 2024 05:54 )   PT: 14.9 sec;   INR: 1.29 ratio          RADIOLOGY:  Reviewed     PHYSICAL EXAM:  General: NAD, AAOx3  HEENT: atraumatic, normocephalic  Pulmonary: clear to auscultation bilaterally; No wheeze  Cardiovascular: Regular rate and rhythm; no murmurs, rubs or gallops. Normal S1S2  Gastrointestinal: Soft, nontender, nondistended; bowel sounds present  Musculoskeletal: 2+ peripheral pulses, no clubbing, cyanosis or edema  Neurology: Pt. alert and oriented, fluent speech, able to move all extremities  Skin: no rashes or lesions

## 2024-11-09 NOTE — PROGRESS NOTE ADULT - SUBJECTIVE AND OBJECTIVE BOX
NEPHROLOGY INTERVAL HPI/OVERNIGHT EVENTS:    Examined earlier  No distress  denies HA CP no SOB    MEDICATIONS  (STANDING):  amLODIPine   Tablet 5 milliGRAM(s) Oral daily  aspirin enteric coated 81 milliGRAM(s) Oral daily  atorvastatin 40 milliGRAM(s) Oral at bedtime  carbamide peroxide Otic Solution 5 Drop(s) Right Ear two times a day  carvedilol 6.25 milliGRAM(s) Oral every 12 hours  chlorhexidine 2% Cloths 1 Application(s) Topical <User Schedule>  cloNIDine 0.1 milliGRAM(s) Oral two times a day  clopidogrel Tablet 75 milliGRAM(s) Oral daily  epoetin yolanda-epbx (RETACRIT) Injectable 13815 Unit(s) IV Push <User Schedule>  hydrALAZINE 50 milliGRAM(s) Oral two times a day  isosorbide   mononitrate ER Tablet (IMDUR) 30 milliGRAM(s) Oral daily  levETIRAcetam 1000 milliGRAM(s) Oral two times a day  multivitamin 1 Tablet(s) Oral daily  Nephro-jeanie 1 Tablet(s) Oral daily  sevelamer carbonate 800 milliGRAM(s) Oral three times a day with meals  tamsulosin 0.4 milliGRAM(s) Oral at bedtime  torsemide 10 milliGRAM(s) Oral <User Schedule>    MEDICATIONS  (PRN):  acetaminophen     Tablet .. 650 milliGRAM(s) Oral every 6 hours PRN Temp greater or equal to 38C (100.4F), Mild Pain (1 - 3)  aluminum hydroxide/magnesium hydroxide/simethicone Suspension 30 milliLiter(s) Oral every 4 hours PRN Dyspepsia  melatonin 3 milliGRAM(s) Oral at bedtime PRN Insomnia  metoprolol tartrate Injectable 5 milliGRAM(s) IV Push every 6 hours PRN HR sustaining > 120  ondansetron Injectable 4 milliGRAM(s) IV Push every 8 hours PRN Nausea and/or Vomiting      Allergies    penicillin (Other)    Intolerances        Vital Signs Last 24 Hrs  T(C): 36.6 (09 Nov 2024 04:42), Max: 37.1 (08 Nov 2024 20:55)  T(F): 97.9 (09 Nov 2024 04:42), Max: 98.7 (08 Nov 2024 20:55)  HR: 73 (09 Nov 2024 08:17) (65 - 87)  BP: 151/89 (09 Nov 2024 08:17) (146/76 - 157/71)  BP(mean): --  RR: 18 (09 Nov 2024 04:42) (18 - 18)  SpO2: 97% (09 Nov 2024 04:42) (96% - 97%)    Parameters below as of 09 Nov 2024 04:42  Patient On (Oxygen Delivery Method): room air      PHYSICAL EXAM:  GENERAL: Appears chronically ill  HEAD: Atraumatic, Normocephalic  NECK: Supple, No JVD  NERVOUS SYSTEM:  Alert & Oriented X3  CHEST/LUNG: Clear, diminished BS  HEART: No rub  ABDOMEN: Soft, Nontender, +BS  EXTREMITIES:  + dependent edema trace    LABS:                        7.6    8.33  )-----------( 132      ( 09 Nov 2024 05:54 )             24.2     11-09    135  |  99  |  38.7[H]  ----------------------------<  100[H]  3.6   |  24.0  |  6.34[H]    Ca    7.7[L]      09 Nov 2024 05:54  Phos  2.1     11-09  Mg     2.0     11-09      PT/INR - ( 09 Nov 2024 05:54 )   PT: 14.9 sec;   INR: 1.29 ratio           Urinalysis Basic - ( 09 Nov 2024 05:54 )    Color: x / Appearance: x / SG: x / pH: x  Gluc: 100 mg/dL / Ketone: x  / Bili: x / Urobili: x   Blood: x / Protein: x / Nitrite: x   Leuk Esterase: x / RBC: x / WBC x   Sq Epi: x / Non Sq Epi: x / Bacteria: x      Magnesium: 2.0 mg/dL (11-09 @ 05:54)  Phosphorus: 2.1 mg/dL (11-09 @ 05:54)          RADIOLOGY & ADDITIONAL TESTS:

## 2024-11-09 NOTE — PROGRESS NOTE ADULT - ASSESSMENT
64M PMHx atrial fibrillation, ESRD, coronary artery disease with bypass surgery, aortic and mitral valve replacements aortic dissection, bowel ischemia with bowel resection who presented with dyspnea after missing the prior two hemodialysis sessions and missing his medications. Patient admitted for hypertensive urgency 2/2 volume overload 2/2 missed hemodialysis. Hemodialysis was resumed.      Dyspnea/hypertensive urgency 2/2 volume overload in setting of ESRD and missed hemodialysis  - Dialysis Tues/Thurs/Sat via permacath, has left AVF but does not use it  - BNP significantly elevated 2/2 missing hemodialysis sessions and poor compliance with medications  - Improved after hemodialysis, not requiring supplemental oxygen  - CT of the chest was without pulmonary embolus  - Last dialysis 11/7, 0 L removed, planned for dialysis 11/9   - Vascular consulted for evaluation of AVF patency, cleared, will try HD today through AVF if it works then will coordinate removal of PICC line   - Nephrology consulted, dialysis per nephro     Normocytic anemia 2/2 chronic disease and GIB  - 2 bright red bloody BMs yesterday  - Required 1 U PRBC, hemodynamically stable   - C/w EPO   - Prior colonoscopy 06/2024: Neovascular at the anastomoses (s/p partial colectomy). No AVM  - GI consulted, planning colonoscopy Monday   - GI recommending not to hold aspirin/plavix   - Need to be on clear liquids day prior to the procedure  - Bowel regimen Wolfgang night   - Monitor H/H closely  - Maintain active type and screen, two large bore IVs  - Transfuse to maintain Hgb >7    Atrial fibrillation  - Noted to have a history of prior ablation and atrial appendage closure  - On carvedilol     Coronary artery disease / Hypertension  - On aspirin, clopidogrel, and atorvastatin  - Troponin elevation due to demand ischemia from fluid overload and chronic kidney disease  - Close blood pressure monitoring  - Continue on amlodipine, carvedilol, clonidine, hydralazine, and isosorbide    Aortic dissection  - CT chest and abdomen chronic aortic dissection  - Continue with antihypertensive medications    Enlarged mediastinal and right hilar nodes  - Noted on CT of the chest  - For outpatient follow up for repeat CT chest or PET/CT to monitor    Seizures  - On levetiracetam    Dispo: Colonoscopy Monday

## 2024-11-09 NOTE — PROGRESS NOTE ADULT - ASSESSMENT
63 yo male PMH +ESRD (TTS) + HTN + CAD/CABG/AVR/MVR/PAF s/p ablation and Watchman + Type A dissection complicated by bowel ischemia requiring bowel resection +SAH who presented to the ED with SOB and N/V.  Pt has missed his last several dialysis sessions    ESRD: fluid overload, +PVC--> improved  Noncompliance issues contributory  - will arrange for HD today with UF as tolerates    Anemia: +CKD  - cont REINALDO at HD  - iron stores are adequate    RO:  - phos noted    Will follow

## 2024-11-10 DIAGNOSIS — I48.0 PAROXYSMAL ATRIAL FIBRILLATION: ICD-10-CM

## 2024-11-10 DIAGNOSIS — I25.10 ATHEROSCLEROTIC HEART DISEASE OF NATIVE CORONARY ARTERY WITHOUT ANGINA PECTORIS: ICD-10-CM

## 2024-11-10 DIAGNOSIS — Z01.810 ENCOUNTER FOR PREPROCEDURAL CARDIOVASCULAR EXAMINATION: ICD-10-CM

## 2024-11-10 DIAGNOSIS — K62.5 HEMORRHAGE OF ANUS AND RECTUM: ICD-10-CM

## 2024-11-10 LAB
ANION GAP SERPL CALC-SCNC: 12 MMOL/L — SIGNIFICANT CHANGE UP (ref 5–17)
BUN SERPL-MCNC: 23.8 MG/DL — HIGH (ref 8–20)
CALCIUM SERPL-MCNC: 7.5 MG/DL — LOW (ref 8.4–10.5)
CHLORIDE SERPL-SCNC: 100 MMOL/L — SIGNIFICANT CHANGE UP (ref 96–108)
CO2 SERPL-SCNC: 26 MMOL/L — SIGNIFICANT CHANGE UP (ref 22–29)
CREAT SERPL-MCNC: 4.63 MG/DL — HIGH (ref 0.5–1.3)
CULTURE RESULTS: SIGNIFICANT CHANGE UP
EGFR: 13 ML/MIN/1.73M2 — LOW
GLUCOSE SERPL-MCNC: 113 MG/DL — HIGH (ref 70–99)
HCT VFR BLD CALC: 24.5 % — LOW (ref 39–50)
HGB BLD-MCNC: 7.8 G/DL — LOW (ref 13–17)
MAGNESIUM SERPL-MCNC: 1.8 MG/DL — SIGNIFICANT CHANGE UP (ref 1.6–2.6)
MCHC RBC-ENTMCNC: 30 PG — SIGNIFICANT CHANGE UP (ref 27–34)
MCHC RBC-ENTMCNC: 31.8 G/DL — LOW (ref 32–36)
MCV RBC AUTO: 94.2 FL — SIGNIFICANT CHANGE UP (ref 80–100)
PHOSPHATE SERPL-MCNC: 2.1 MG/DL — LOW (ref 2.4–4.7)
PLATELET # BLD AUTO: 157 K/UL — SIGNIFICANT CHANGE UP (ref 150–400)
POTASSIUM SERPL-MCNC: 3.6 MMOL/L — SIGNIFICANT CHANGE UP (ref 3.5–5.3)
POTASSIUM SERPL-SCNC: 3.6 MMOL/L — SIGNIFICANT CHANGE UP (ref 3.5–5.3)
RBC # BLD: 2.6 M/UL — LOW (ref 4.2–5.8)
RBC # FLD: 15.5 % — HIGH (ref 10.3–14.5)
SODIUM SERPL-SCNC: 138 MMOL/L — SIGNIFICANT CHANGE UP (ref 135–145)
SPECIMEN SOURCE: SIGNIFICANT CHANGE UP
WBC # BLD: 8.8 K/UL — SIGNIFICANT CHANGE UP (ref 3.8–10.5)
WBC # FLD AUTO: 8.8 K/UL — SIGNIFICANT CHANGE UP (ref 3.8–10.5)

## 2024-11-10 PROCEDURE — 99232 SBSQ HOSP IP/OBS MODERATE 35: CPT

## 2024-11-10 PROCEDURE — 99222 1ST HOSP IP/OBS MODERATE 55: CPT

## 2024-11-10 RX ORDER — POLYETHYLENE GLYCOL 3350, SODIUM SULFATE ANHYDROUS, SODIUM BICARBONATE, SODIUM CHLORIDE, POTASSIUM CHLORIDE 236; 22.74; 6.74; 5.86; 2.97 G/4L; G/4L; G/4L; G/4L; G/4L
4000 POWDER, FOR SOLUTION ORAL ONCE
Refills: 0 | Status: COMPLETED | OUTPATIENT
Start: 2024-11-10 | End: 2024-11-10

## 2024-11-10 RX ADMIN — ISOSORBIDE MONONITRATE 30 MILLIGRAM(S): 60 TABLET, EXTENDED RELEASE ORAL at 11:33

## 2024-11-10 RX ADMIN — Medication 5 MILLIGRAM(S): at 05:36

## 2024-11-10 RX ADMIN — CLONIDINE HYDROCHLORIDE 0.1 MILLIGRAM(S): 0.2 TABLET ORAL at 17:06

## 2024-11-10 RX ADMIN — Medication 3 MILLIGRAM(S): at 03:16

## 2024-11-10 RX ADMIN — Medication 1 TABLET(S): at 11:15

## 2024-11-10 RX ADMIN — Medication 40 MILLIGRAM(S): at 21:18

## 2024-11-10 RX ADMIN — CHLORHEXIDINE GLUCONATE 1 APPLICATION(S): 40 SOLUTION TOPICAL at 05:36

## 2024-11-10 RX ADMIN — Medication 0.5 MILLIGRAM(S): at 03:16

## 2024-11-10 RX ADMIN — CLOPIDOGREL 75 MILLIGRAM(S): 75 TABLET ORAL at 11:14

## 2024-11-10 RX ADMIN — Medication 0.4 MILLIGRAM(S): at 21:18

## 2024-11-10 RX ADMIN — CLONIDINE HYDROCHLORIDE 0.1 MILLIGRAM(S): 0.2 TABLET ORAL at 05:36

## 2024-11-10 RX ADMIN — LEVETIRACETAM 1000 MILLIGRAM(S): 500 TABLET, FILM COATED ORAL at 17:05

## 2024-11-10 RX ADMIN — HYDRALAZINE HYDROCHLORIDE 50 MILLIGRAM(S): 50 TABLET, FILM COATED ORAL at 17:06

## 2024-11-10 RX ADMIN — SEVELAMER CARBONATE 800 MILLIGRAM(S): 800 TABLET, FILM COATED ORAL at 11:15

## 2024-11-10 RX ADMIN — Medication 81 MILLIGRAM(S): at 11:15

## 2024-11-10 RX ADMIN — HYDRALAZINE HYDROCHLORIDE 50 MILLIGRAM(S): 50 TABLET, FILM COATED ORAL at 05:35

## 2024-11-10 RX ADMIN — CARVEDILOL 6.25 MILLIGRAM(S): 25 TABLET, FILM COATED ORAL at 17:04

## 2024-11-10 RX ADMIN — POLYETHYLENE GLYCOL 3350, SODIUM SULFATE ANHYDROUS, SODIUM BICARBONATE, SODIUM CHLORIDE, POTASSIUM CHLORIDE 4000 MILLILITER(S): 236; 22.74; 6.74; 5.86; 2.97 POWDER, FOR SOLUTION ORAL at 17:04

## 2024-11-10 RX ADMIN — CARVEDILOL 6.25 MILLIGRAM(S): 25 TABLET, FILM COATED ORAL at 05:35

## 2024-11-10 RX ADMIN — SEVELAMER CARBONATE 800 MILLIGRAM(S): 800 TABLET, FILM COATED ORAL at 17:05

## 2024-11-10 RX ADMIN — Medication 1 TABLET(S): at 11:14

## 2024-11-10 RX ADMIN — LEVETIRACETAM 1000 MILLIGRAM(S): 500 TABLET, FILM COATED ORAL at 05:35

## 2024-11-10 NOTE — PROGRESS NOTE ADULT - SUBJECTIVE AND OBJECTIVE BOX
Ramon Easton MD  [facility] Medicine  Contact via Teams or text/call at [phone]    Patient is a 64y old  Male who presents with a chief complaint of ZUNIGA  Anemia (07 Nov 2024 18:01)    Feels okay.  Mild dyspnea but otherwise feels okay.  Denies bleed in stool.      Patient seen and examined at bedside. No overnight events reported.     ALLERGIES:  penicillin (Other)    MEDICATIONS  (STANDING):  amLODIPine   Tablet 5 milliGRAM(s) Oral daily  aspirin enteric coated 81 milliGRAM(s) Oral daily  atorvastatin 40 milliGRAM(s) Oral at bedtime  carvedilol 6.25 milliGRAM(s) Oral every 12 hours  chlorhexidine 2% Cloths 1 Application(s) Topical <User Schedule>  cloNIDine 0.1 milliGRAM(s) Oral two times a day  clopidogrel Tablet 75 milliGRAM(s) Oral daily  epoetin yolanda-epbx (RETACRIT) Injectable 42992 Unit(s) IV Push <User Schedule>  hydrALAZINE 50 milliGRAM(s) Oral two times a day  isosorbide   mononitrate ER Tablet (IMDUR) 30 milliGRAM(s) Oral daily  levETIRAcetam 1000 milliGRAM(s) Oral two times a day  multivitamin 1 Tablet(s) Oral daily  Nephro-jeanie 1 Tablet(s) Oral daily  polyethylene glycol/electrolyte Solution. 4000 milliLiter(s) Oral once  sevelamer carbonate 800 milliGRAM(s) Oral three times a day with meals  tamsulosin 0.4 milliGRAM(s) Oral at bedtime  torsemide 10 milliGRAM(s) Oral <User Schedule>    MEDICATIONS  (PRN):  acetaminophen     Tablet .. 650 milliGRAM(s) Oral every 6 hours PRN Temp greater or equal to 38C (100.4F), Mild Pain (1 - 3)  ALPRAZolam 0.5 milliGRAM(s) Oral every 8 hours PRN anxiety  aluminum hydroxide/magnesium hydroxide/simethicone Suspension 30 milliLiter(s) Oral every 4 hours PRN Dyspepsia  melatonin 3 milliGRAM(s) Oral at bedtime PRN Insomnia  metoprolol tartrate Injectable 5 milliGRAM(s) IV Push every 6 hours PRN HR sustaining > 120  ondansetron Injectable 4 milliGRAM(s) IV Push every 8 hours PRN Nausea and/or Vomiting    Vital Signs Last 24 Hrs  T(F): 97 (10 Nov 2024 08:36), Max: 98.5 (09 Nov 2024 14:05)  HR: 83 (10 Nov 2024 08:36) (83 - 129)  BP: 149/77 (10 Nov 2024 08:36) (138/68 - 193/72)  RR: 18 (10 Nov 2024 08:36) (18 - 18)  SpO2: 99% (10 Nov 2024 08:36) (97% - 100%)  I&O's Summary    PHYSICAL EXAM:  General: NAD, A/O x 3  ENT: No gross hearing impairment, Moist mucous membranes, no thrush  Neck: Supple, No JVD  Lungs: Clear to auscultation bilaterally, good air entry, non-labored breathing  Cardio: RRR, S1/S2, No murmur  Abdomen: Soft, Nontender, Nondistended; Bowel sounds present  Extremities: No calf tenderness, No cyanosis, No pitting edema  Psych: Appropriate mood and affect    LABS:                        7.8    8.80  )-----------( 157      ( 10 Nov 2024 05:17 )             24.5     11-10    138  |  100  |  23.8  ----------------------------<  113  3.6   |  26.0  |  4.63    Ca    7.5      10 Nov 2024 05:17  Phos  2.1     11-10  Mg     1.8     11-10    PT/INR - ( 09 Nov 2024 05:54 )   PT: 14.9 sec;   INR: 1.29 ratio       Urinalysis Basic - ( 10 Nov 2024 05:17 )    Color: x / Appearance: x / SG: x / pH: x  Gluc: 113 mg/dL / Ketone: x  / Bili: x / Urobili: x   Blood: x / Protein: x / Nitrite: x   Leuk Esterase: x / RBC: x / WBC x   Sq Epi: x / Non Sq Epi: x / Bacteria: x        Culture - Blood (collected 05 Nov 2024 17:12)  Source: .Blood BLOOD  Preliminary Report (09 Nov 2024 22:01):    No growth at 4 days    Culture - Urine (collected 05 Nov 2024 16:51)  Source: Catheterized Catheterized  Final Report (06 Nov 2024 18:59):    <10,000 CFU/mL Normal Urogenital Sivan        RADIOLOGY & ADDITIONAL TESTS:    Care Discussed with Consultants/Other Providers:

## 2024-11-10 NOTE — PROGRESS NOTE ADULT - ASSESSMENT
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  Pt has missed his last several dialysis sessions    ESRD: fluid overload, +PVC--> improved  Noncompliance issues contributory  - will cont HD on TTS schedule    Anemia: +CKD  - cont REINALDO at HD  - iron stores are adequate    RO:  - phos noted    Will follow

## 2024-11-10 NOTE — CONSULT NOTE ADULT - PROBLEM SELECTOR RECOMMENDATION 3
- hx of pAF, currently NSR on telemetry   - s/p ablation and left atrial appendage closure   - c/w coreg for rate control. - hx of pAF, currently NSR on telemetry   - s/p ablation and left atrial appendage closure   - c/w coreg for rate control.  - as per Dr. Mosqueda note 8/2/2024 s/p Watchman:  "Follow-up within 4 weeks and schedule 45 day MUSTAPHA at follow up. At that time if a well seated device is noted, will stop OAC. will plan to transition to single antiplatelet therapy after 3 months."  - Pt had FU TTE 9/2024 which showed well seated watchman device with no clot.  - Pt already followed wt Dr. Frankel 10/2024 who recommended to stop Plavix  - Pt completed 3 months of post watchman interval on 11/2/2024: Thus, he can continue on ASA only as per the recommendations.  - Cont ASA 81 mg daily once deemed safe as per GI.   - d/c Plavix.

## 2024-11-10 NOTE — CONSULT NOTE ADULT - PROBLEM SELECTOR RECOMMENDATION 9
Cardiac Risk Stratification for Procedure  - METS >4   - RCRI: Estimated Risk of Adverse Outcome with Non-cardiac Surgery: low risk   - Estimated Rate of Myocardial Infarction, Pulmonary Edema, Ventricular Fibrillation, Cardiac Arrest, or Complete Heart Block is 0.9%      - GI may proceed with surgery and procedures (colonoscopy) with the understanding of the above risk profile. No absolute cardiac contraindication to the procedure/surgery.     No active chest pain, evidence of ischemia, decompensated CHF, significant valvular abnormality, or unstable arrhythmia then therefore no absolute cardiac contraindication to the planned surgical procedure.  No further cardiac work up is needed.     Further cardiac work up will not change risk of the patient. Proceed for surgery as indicated.

## 2024-11-10 NOTE — PROGRESS NOTE ADULT - PROBLEM SELECTOR PLAN 1
Resolved. Received HD yesterday. For colonoscopy tomorrow. Bowel prep ordered. Repeat labs ordered for the AM. Resolved. Received HD yesterday. For colonoscopy tomorrow. Bowel prep ordered. Repeat labs ordered for the AM. Will need both cardiac and vascular clearances for colonoscopy tomorrow. C/M d/w Dr. Easton this AM.

## 2024-11-10 NOTE — CONSULT NOTE ADULT - NS ATTEND AMEND GEN_ALL_CORE FT
pt has not used AVG , may have been told it is not functioning ? should use AVF , it is superficial , excellent thrill with good flow on  ultrasound
Patient is a 65 yo M with PMH afib, CAD s/p CABG x2, AVR, MVR, aortic dissection and prior ischemic colitis s/p colectomy who presented with volume overload. Patient is s/p HD with improvement, but today was noted to have hematochezia. Patient hemodynamically stable and feels well. No further episodes of BRBPR. Hgb responded well to 1 unit pRBC. Plan for colonoscopy Monday to better evaluate. Will need vascular and cardiovascular clearance.
-    64M hx atrial fibrillation, s/p ablation 9/2021, s/p Watchman procedure 8/2/2024, ESRD, coronary artery disease with bypass surgery, aortic and mitral valve replacements aortic dissection, bowel ischemia with bowel resection who presented with dyspnea after missing the prior two hemodialysis sessions and missing his medications. Patient admitted for hypertensive urgency 2/2 volume overload 2/2 missed hemodialysis. Hemodialysis was resumed. Found to be FOBT positive.   Cardiology consulted for risk stratification     Preoperative cardiovascular examination.   Cardiac Risk Stratification for Procedure: Colonoscopy is planned.  - METS >4   - RCRI: Estimated Risk of Adverse Outcome with Non-cardiac Surgery: low risk   - Estimated Rate of Myocardial Infarction, Pulmonary Edema, Ventricular Fibrillation, Cardiac Arrest, or Complete Heart Block is 0.9%    - GI may proceed with surgery and procedures (colonoscopy) with the understanding of the above risk profile. No absolute cardiac contraindication to the procedure/surgery.      CAD   - hx of CAD.   - resume ASA when cleared by GI. per outpatient cardiology notes, stop plavix     PAF   - hx of pAF, currently NSR on telemetry   - s/p ablation and left atrial appendage closure   - as per Dr. Mosqueda note 8/2/2024 s/p Watchman:  "Follow-up within 4 weeks and schedule 45 day MUSTAPHA at follow up. At that time if a well seated device is noted, will stop OAC. will plan to transition to single antiplatelet therapy after 3 months."  - Pt had FU TTE 9/2024 which showed well seated watchman device with no clot.  - Pt already followed wt Dr. Frankel 10/2024 who recommended to stop Plavix  - Pt completed 3 months of post watchman interval on 11/2/2024: Thus, he can continue on ASA only as per the recommendations.  - Cont ASA 81 mg daily once deemed safe as per GI.   - d/c Plavix.

## 2024-11-10 NOTE — PROGRESS NOTE ADULT - TIME BILLING
Reviewing prior imaging and lab work. Reviewing prior imaging and lab work. C/M d/w Dr. Rustam cherry AM.

## 2024-11-10 NOTE — CONSULT NOTE ADULT - SUBJECTIVE AND OBJECTIVE BOX
[facility]                                              CARDIOLOGY AT [address], [place] [address]                                             Telephone: [phone]. Fax:[phone]                                                       CARDIOLOGY CONSULTATION NOTE                                                                                             History obtained by: Patient and medical record  Community Cardiologist: Dr Frankel    obtained: Yes [  ] No [ x ]  Reason for Consultation: risk stratification   Available out pt records reviewed: Yes [ x ] No [  ]    Chief complaint:    Patient is a 64y old  Male who presents with a chief complaint of GI Bleed (10 Nov 2024 09:31)      HPI:  63 yo male with PMH of paroxysmal atrial fibrillation/flutter (s/p ablation, s/p watchman), BPH, seizure disorder, HTN, HLD, CAD s/p CABG x 2, AVR, MVR 11/2020 with Dr. 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 Associated w/ N/V (NBNB, 2 episodes, last episode earlier today). Pt denies CP, diarrhea, fever, chills, cough. Rest of ROS (-). Pt says he missed his last 2 HD sessions and has not been taking PO meds as he didn't feel good. While in the ED the pt received labetalol 20mg x2, dilaudid 0.5mg x2, nitroglycerin 0.4mg x1, zofran. Pt is admitted for HTN urgecy secondary to volume overload due to missed hemodialysis. Of note, pt is having a GIB requiring 1 unit PRBC.       CARDIAC TESTING   ECHO: < from: MUSTAPHA W or WO Ultrasound Enhancing Agent (09.19.24 @ 10:05) >   1. (+) interatrial shunt. No cardiac mass, vegetations, or intra cardiac thrombus visualized.   2. Left atrium is mildly dilated.   3. Atrial septal defect consistent with a prior atrial septectomy.   4. A Watchman closure device is present in the left atrial appendage. The left atrial appendage closure device appears to be positioned normally. No residual flow observed. There is no device related thrombus seen.   5. Left ventricular systolic function is normal with an ejection fraction visually estimated at 55 to 60 %.   6. The right atrium is mildly dilated.   7. Mildly enlarged right ventricular cavity size and normal right ventricular systolic function.   8. Bioprosthetic valve present. in the mitral position. Well seated prosthetic mitral valve with normal function with normal leaflet excursion.   9. Bioprosthetic aortic valve replacement. Well seated valve. Gradients not obtained. Normal leaflet excursion.. No regurgitation.  10. Moderate tricuspid regurgitation.  11. Estimated pulmonary artery systolic pressure is 51 mmHg, consistent with moderate pulmonary hypertension.  12. No pericardial effusion seen.  13. Conclusion: s/p Watchman procedure (27 mm): Well-seated device. Adequate compression. No leak.  14. Ascending aorta repair. There is a type B dissection with small true lumen. Thrombosed false lumen. Dissection starts from the arch of aorta all the      The native root measures 4.8 cm.    < end of copied text >  Echo: 4/2024 LVEF 50-55%, G2DD, normal functioning AVR and MVR, PASP 26 mmHg      STRESS: none     CATH: < from: Cardiac Cath Lab - Adult (11.10.20 @ 07:43) >  DIAGNOSTIC IMPRESSIONS: - Patent ascending aortic graft  - Severe 2 vessel CAD with chronically occluded proximal RCA. There are   brisk collaterals from the LCA.  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, subclavian   artery, arch, and descending aorta  - Low normal LV systolic function with moderate to severe AI and MR (best   seen on echo)  - The IMAs are both patent  - Cardiac output 5.1 LPM; Cardiac index 2.5  - Markedly elevated LVEDP =38mmHg    < end of copied text >      ELECTROPHYSIOLOGY: none     PAST MEDICAL HISTORY  CHF (congestive heart failure)    CVA (cerebral vascular accident)    Chronic kidney disease    Seizures    HTN (hypertension)    Hyperlipemia    COVID-19    Atrial fibrillation    ESRD on dialysis    Former smoker    History of cocaine use    H/O aortic dissection    H/O aortic valve insufficiency    Mitral regurgitation    GIB (gastrointestinal bleeding)    CAD (coronary artery disease)    Chronic atrial fibrillation    Aneurysm of artery of upper extremity    Anemia of chronic disease    End stage renal disease    Myocardial infarction    COVID-19 vaccine series completed    Port-A-Cath in place        PAST SURGICAL HISTORY  Aorta disorder    H/O colectomy    Status post double vessel coronary artery bypass    S/P AVR (aortic valve replacement)    S/P MVR (mitral valve replacement)    H/O aortic dissection    AV fistula    History of renal transplant    Presence of Watchman left atrial appendage closure device        SOCIAL HISTORY:  Denies smoking/alcohol/drugs  CIGARETTES:     ALCOHOL:  DRUGS:    FAMILY HISTORY:  FH: hypertension    Family history of cardiac disorder (Mother)  mother      Family History of Cardiovascular Disease:  Yes [ x ] No [  ]  Coronary Artery Disease in first degree relative: Yes [ x ] No [  ]  Sudden Cardiac Death in First degree relative: Yes [  ] No [  x]    HOME MEDICATIONS:  amLODIPine 10 mg oral tablet: 1 tab(s) orally once a day (19 Sep 2024 09:35)  Aspi-Cor 81 mg oral delayed release tablet: 1 tab(s) orally once a day (19 Sep 2024 10:53)  atorvastatin 40 mg oral tablet: 1 tab(s) orally once a day (at bedtime) (19 Sep 2024 09:35)  carvedilol 6.25 mg oral tablet: 1 tab(s) orally every 12 hours (19 Sep 2024 09:35)  hydrALAZINE 50 mg oral tablet: 1 tab(s) orally 2 times a day (19 Sep 2024 09:33)  isosorbide mononitrate 30 mg oral tablet, extended release: 1 tab(s) orally once a day (19 Sep 2024 09:35)  tamsulosin 0.4 mg oral capsule: 1 cap(s) orally once a day (at bedtime) (19 Sep 2024 09:35)  torsemide 10 mg oral tablet: 1 tab(s) orally every other day (19 Sep 2024 09:35)      CURRENT CARDIAC MEDICATIONS:  amLODIPine   Tablet 5 milliGRAM(s) Oral daily  carvedilol 6.25 milliGRAM(s) Oral every 12 hours  cloNIDine 0.1 milliGRAM(s) Oral two times a day  hydrALAZINE 50 milliGRAM(s) Oral two times a day  isosorbide   mononitrate ER Tablet (IMDUR) 30 milliGRAM(s) Oral daily  metoprolol tartrate Injectable 5 milliGRAM(s) IV Push every 6 hours PRN HR sustaining > 120  torsemide 10 milliGRAM(s) Oral <User Schedule>      CURRENT OTHER MEDICATIONS:  acetaminophen     Tablet .. 650 milliGRAM(s) Oral every 6 hours PRN Temp greater or equal to 38C (100.4F), Mild Pain (1 - 3)  ALPRAZolam 0.5 milliGRAM(s) Oral every 8 hours PRN anxiety  levETIRAcetam 1000 milliGRAM(s) Oral two times a day  melatonin 3 milliGRAM(s) Oral at bedtime PRN Insomnia  ondansetron Injectable 4 milliGRAM(s) IV Push every 8 hours PRN Nausea and/or Vomiting  aluminum hydroxide/magnesium hydroxide/simethicone Suspension 30 milliLiter(s) Oral every 4 hours PRN Dyspepsia  polyethylene glycol/electrolyte Solution. 4000 milliLiter(s) Oral once, Stop order after: 1 Doses  aspirin enteric coated 81 milliGRAM(s) Oral daily  atorvastatin 40 milliGRAM(s) Oral at bedtime  chlorhexidine 2% Cloths 1 Application(s) Topical <User Schedule>  clopidogrel Tablet 75 milliGRAM(s) Oral daily  epoetin yolanda-epbx (RETACRIT) Injectable 29566 Unit(s) IV Push <User Schedule>  multivitamin 1 Tablet(s) Oral daily  Nephro-jeanie 1 Tablet(s) Oral daily  sevelamer carbonate 800 milliGRAM(s) Oral three times a day with meals  tamsulosin 0.4 milliGRAM(s) Oral at bedtime      ALLERGIES:   penicillin (Other)      REVIEW OF SYMPTOMS:   CONSTITUTIONAL: No fever, no chills, no weight loss, no weight gain, no fatigue   ENMT:  No vertigo; No sinus or throat pain  NECK: No pain or stiffness  CARDIOVASCULAR: No chest pain, no dyspnea, no syncope/presyncope, no palpitations, no dizziness, no Orthopnea, no Paroxsymal nocturnal dyspnea  RESPIRATORY: no Shortness of breath, no cough, no wheezing  : No dysuria, no hematuria   GI: No dark color stool, no nausea, no diarrhea, no constipation, no abdominal pain   NEURO: No headache, no slurred speech   MUSCULOSKELETAL: No joint pain or swelling; No muscle, back, or extremity pain  PSYCH: No agitation, no anxiety.    ALL OTHER REVIEW OF SYSTEMS ARE NEGATIVE.    VITAL SIGNS:  T(C): 36.1 (11-10-24 @ 08:36), Max: 36.9 (11-09-24 @ 14:05)  T(F): 97 (11-10-24 @ 08:36), Max: 98.5 (11-09-24 @ 14:05)  HR: 83 (11-10-24 @ 08:36) (83 - 129)  BP: 149/77 (11-10-24 @ 08:36) (138/68 - 176/77)  RR: 18 (11-10-24 @ 08:36) (18 - 18)  SpO2: 99% (11-10-24 @ 08:36) (97% - 99%)    INTAKE AND OUTPUT:       PHYSICAL EXAM:  Constitutional: Comfortable . No acute distress.   HEENT: Atraumatic and normocephalic , neck is supple . no JVD. No carotid bruit.  CNS: A&Ox3. No focal deficits.   Respiratory: CTAB, unlabored   Cardiovascular: RRR normal s1 s2. No murmur. No rubs or gallop.  Gastrointestinal: Soft, non-tender. +Bowel sounds.   Extremities: 2+ Peripheral Pulses, No clubbing, cyanosis, or edema  Psychiatric: Calm . no agitation.   Skin: Warm and dry, no ulcers on extremities     LABS:                            7.8    8.80  )-----------( 157      ( 10 Nov 2024 05:17 )             24.5     11-10    138  |  100  |  23.8[H]  ----------------------------<  113[H]  3.6   |  26.0  |  4.63[H]    Ca    7.5[L]      10 Nov 2024 05:17  Phos  2.1     11-10  Mg     1.8     11-10      PT/INR - ( 09 Nov 2024 05:54 )   PT: 14.9 sec;   INR: 1.29 ratio           Urinalysis Basic - ( 10 Nov 2024 05:17 )    Color: x / Appearance: x / SG: x / pH: x  Gluc: 113 mg/dL / Ketone: x  / Bili: x / Urobili: x   Blood: x / Protein: x / Nitrite: x   Leuk Esterase: x / RBC: x / WBC x   Sq Epi: x / Non Sq Epi: x / Bacteria: x              INTERPRETATION OF TELEMETRY: NSR     ECG: NSR   Prior ECG: Yes [ x ] No [  ]    RADIOLOGY & ADDITIONAL STUDIES:    X-ray:    CT scan:   < from: CT Head No Cont (11.07.24 @ 17:42) >  IMPRESSION:  No acute intracranial hemorrhage, mass effect or midline shift.    < end of copied text >  MRI:   US:

## 2024-11-10 NOTE — CONSULT NOTE ADULT - PROBLEM SELECTOR RECOMMENDATION 2
- hx of CAD.   - pt denies chest pain, chest pressure and anginal equivalent symptoms   -  resume ASA when cleared by GI. per outpatient cardiology notes, stop plavix   - c/w statin.

## 2024-11-10 NOTE — PROGRESS NOTE ADULT - SUBJECTIVE AND OBJECTIVE BOX
NEPHROLOGY INTERVAL HPI/OVERNIGHT EVENTS:    Examined earlier  No distress    MEDICATIONS  (STANDING):  amLODIPine   Tablet 5 milliGRAM(s) Oral daily  aspirin enteric coated 81 milliGRAM(s) Oral daily  atorvastatin 40 milliGRAM(s) Oral at bedtime  carvedilol 6.25 milliGRAM(s) Oral every 12 hours  chlorhexidine 2% Cloths 1 Application(s) Topical <User Schedule>  cloNIDine 0.1 milliGRAM(s) Oral two times a day  clopidogrel Tablet 75 milliGRAM(s) Oral daily  epoetin yolanda-epbx (RETACRIT) Injectable 08265 Unit(s) IV Push <User Schedule>  hydrALAZINE 50 milliGRAM(s) Oral two times a day  isosorbide   mononitrate ER Tablet (IMDUR) 30 milliGRAM(s) Oral daily  levETIRAcetam 1000 milliGRAM(s) Oral two times a day  multivitamin 1 Tablet(s) Oral daily  Nephro-jeanie 1 Tablet(s) Oral daily  polyethylene glycol/electrolyte Solution. 4000 milliLiter(s) Oral once  sevelamer carbonate 800 milliGRAM(s) Oral three times a day with meals  tamsulosin 0.4 milliGRAM(s) Oral at bedtime  torsemide 10 milliGRAM(s) Oral <User Schedule>    MEDICATIONS  (PRN):  acetaminophen     Tablet .. 650 milliGRAM(s) Oral every 6 hours PRN Temp greater or equal to 38C (100.4F), Mild Pain (1 - 3)  ALPRAZolam 0.5 milliGRAM(s) Oral every 8 hours PRN anxiety  aluminum hydroxide/magnesium hydroxide/simethicone Suspension 30 milliLiter(s) Oral every 4 hours PRN Dyspepsia  melatonin 3 milliGRAM(s) Oral at bedtime PRN Insomnia  metoprolol tartrate Injectable 5 milliGRAM(s) IV Push every 6 hours PRN HR sustaining > 120  ondansetron Injectable 4 milliGRAM(s) IV Push every 8 hours PRN Nausea and/or Vomiting      Allergies    penicillin (Other)    Intolerances        Vital Signs Last 24 Hrs  T(C): 36.7 (10 Nov 2024 05:21), Max: 36.9 (2024 14:05)  T(F): 98 (10 Nov 2024 05:21), Max: 98.5 (2024 14:05)  HR: 88 (10 Nov 2024 05:21) (88 - 129)  BP: 143/67 (10 Nov 2024 05:21) (138/68 - 193/72)  BP(mean): --  RR: 18 (10 Nov 2024 05:21) (18 - 18)  SpO2: 98% (10 Nov 2024 05:21) (97% - 100%)    Parameters below as of 10 Nov 2024 05:21  Patient On (Oxygen Delivery Method): room air      Daily     Daily Weight in k.1 (10 Nov 2024 06:04)    PHYSICAL EXAM:  GENERAL: Appears chronically ill  HEAD: Atraumatic, Normocephalic  NECK: Supple, No JVD  NERVOUS SYSTEM:  Alert & Oriented X3  CHEST/LUNG: Clear, diminished BS  HEART: No rub  ABDOMEN: Soft, Nontender, +BS  EXTREMITIES:  + dependent edema trace    LABS:                        7.8    8.80  )-----------( 157      ( 10 Nov 2024 05:17 )             24.5     11-10    138  |  100  |  23.8[H]  ----------------------------<  113[H]  3.6   |  26.0  |  4.63[H]    Ca    7.5[L]      10 Nov 2024 05:17  Phos  2.1     11-10  Mg     1.8     11-10      PT/INR - ( 2024 05:54 )   PT: 14.9 sec;   INR: 1.29 ratio           Urinalysis Basic - ( 10 Nov 2024 05:17 )    Color: x / Appearance: x / SG: x / pH: x  Gluc: 113 mg/dL / Ketone: x  / Bili: x / Urobili: x   Blood: x / Protein: x / Nitrite: x   Leuk Esterase: x / RBC: x / WBC x   Sq Epi: x / Non Sq Epi: x / Bacteria: x      Magnesium: 1.8 mg/dL (11-10 @ 05:17)  Phosphorus: 2.1 mg/dL (11-10 @ 05:17)          RADIOLOGY & ADDITIONAL TESTS:

## 2024-11-10 NOTE — PROGRESS NOTE ADULT - ASSESSMENT
64M PMHx atrial fibrillation, ESRD, coronary artery disease with bypass surgery, aortic and mitral valve replacements aortic dissection, bowel ischemia with bowel resection who presented with dyspnea after missing the prior two hemodialysis sessions and missing his medications. Patient admitted for hypertensive urgency 2/2 volume overload 2/2 missed hemodialysis. Hemodialysis was resumed.       Hypertensive Urgency/SOB secondary to Volume overload, non-cardiogenic pulmonary edema, missed dialysis   - Dialysis Tues/Thurs/Sat via permacath, has left AVF but does not use it  - BNP significantly elevated 2/2 missing hemodialysis sessions and poor compliance with medications  - Improved after hemodialysis, not requiring supplemental oxygen  - CT of the chest was without pulmonary embolus  - Last dialysis 11/7, 0 L removed, planned for dialysis 11/9   - Vascular consulted for evaluation of AVF patency, cleared, will try HD today through AVF if it works then will coordinate removal of PICC line   - Nephrology consulted, dialysis per nephro     Normocytic anemia 2/2 chronic disease and GIB  - 2 bright red bloody BMs  - hemoglobin remains low but stable   - Required 1 U PRBC, hemodynamically stable   - C/w EPO   - Prior colonoscopy 06/2024: Neovascular at the anastomoses (s/p partial colectomy). No AVM  - GI consulted, planning colonoscopy Monday   - GI recommending not to hold aspirin/plavix   - Need to be on clear liquids day prior to the procedure  - Bowel regimen Wolfgang night   - Monitor H/H closely  - Maintain active type and screen, two large bore IVs  - Transfuse to maintain Hgb >7    Chronic Atrial fibrillation  - Noted to have a history of prior ablation and atrial appendage closure  - On carvedilol    Coronary artery disease / Hypertension  - On aspirin, clopidogrel, and atorvastatin  - Troponin elevation due to demand ischemia from fluid overload and chronic kidney disease  - Close blood pressure monitoring  - Continue on amlodipine, carvedilol, clonidine, hydralazine, and isosorbide    Aortic dissection  - CT chest and abdomen chronic aortic dissection  - Continue with antihypertensive medications    Enlarged mediastinal and right hilar nodes  - Noted on CT of the chest  - For outpatient follow up for repeat CT chest or PET/CT to monitor    Seizures  - On levetiracetam    Dispo: Colonoscopy Monday, npo after midnight tonight 64M PMHx atrial fibrillation, ESRD, coronary artery disease with bypass surgery, aortic and mitral valve replacements aortic dissection, bowel ischemia with bowel resection who presented with dyspnea after missing the prior two hemodialysis sessions and missing his medications. Patient admitted for hypertensive urgency 2/2 volume overload 2/2 missed hemodialysis. Hemodialysis was resumed.       Hypertensive Urgency/SOB secondary to Volume overload, non-cardiogenic pulmonary edema, missed dialysis   - Dialysis Tues/Thurs/Sat via permacath, has left AVF but does not use it  - BNP significantly elevated 2/2 missing hemodialysis sessions and poor compliance with medications  - Improved after hemodialysis, not requiring supplemental oxygen  - CT of the chest was without pulmonary embolus  - Last dialysis 11/7, 0 L removed, planned for dialysis 11/9   - Vascular consulted for evaluation of AVF patency, cleared, will try HD today through AVF if it works then will coordinate removal of PICC line   - Nephrology consulted, dialysis per nephro     Normocytic anemia 2/2 chronic disease and GIB  - 2 bright red bloody BMs  - hemoglobin remains low but stable   - Required 1 U PRBC, hemodynamically stable   - C/w EPO   - Prior colonoscopy 06/2024: Neovascular at the anastomoses (s/p partial colectomy). No AVM  - GI consulted, planning colonoscopy Monday   - GI recommending not to hold aspirin/plavix   - Need to be on clear liquids day prior to the procedure  - Bowel regimen Wolfgang night   - Monitor H/H closely  - Maintain active type and screen, two large bore IVs  - Transfuse to maintain Hgb >7  - cardiology and vascular consulted for cardiac clearance    Chronic Atrial fibrillation  - Noted to have a history of prior ablation and atrial appendage closure  - On carvedilol    Coronary artery disease / Hypertension  - On aspirin, clopidogrel, and atorvastatin  - Troponin elevation due to demand ischemia from fluid overload and chronic kidney disease  - Close blood pressure monitoring  - Continue on amlodipine, carvedilol, clonidine, hydralazine, and isosorbide    Aortic dissection  - CT chest and abdomen chronic aortic dissection  - Continue with antihypertensive medications    Enlarged mediastinal and right hilar nodes  - Noted on CT of the chest  - For outpatient follow up for repeat CT chest or PET/CT to monitor    Seizures  - On levetiracetam    Dispo: Colonoscopy Monday, npo after midnight tonight

## 2024-11-10 NOTE — PROGRESS NOTE ADULT - SUBJECTIVE AND OBJECTIVE BOX
Pt seen and examined for ABLA 2/2 rectal bleeding.    Pt. seen this AM. Groggy when seen because of sleeping problems last night. No RB overnight. No GI complaints.     REVIEW OF SYSTEMS:  Constitutional: No fever, weight loss or fatigue  Cardiovascular: No chest pain, palpitations, dizziness or leg swelling  Gastrointestinal: No abdominal or epigastric pain. No nausea, vomiting or hematemesis; No diarrhea or constipation. No melena or hematochezia.  Skin: No itching, burning, rashes or lesions   Remainder of 14 point ROS: Negative      MEDICATIONS:  MEDICATIONS  (STANDING):  amLODIPine   Tablet 5 milliGRAM(s) Oral daily  aspirin enteric coated 81 milliGRAM(s) Oral daily  atorvastatin 40 milliGRAM(s) Oral at bedtime  carvedilol 6.25 milliGRAM(s) Oral every 12 hours  chlorhexidine 2% Cloths 1 Application(s) Topical <User Schedule>  cloNIDine 0.1 milliGRAM(s) Oral two times a day  clopidogrel Tablet 75 milliGRAM(s) Oral daily  epoetin yolanda-epbx (RETACRIT) Injectable 27882 Unit(s) IV Push <User Schedule>  hydrALAZINE 50 milliGRAM(s) Oral two times a day  isosorbide   mononitrate ER Tablet (IMDUR) 30 milliGRAM(s) Oral daily  levETIRAcetam 1000 milliGRAM(s) Oral two times a day  multivitamin 1 Tablet(s) Oral daily  Nephro-jeanie 1 Tablet(s) Oral daily  polyethylene glycol/electrolyte Solution. 4000 milliLiter(s) Oral once  sevelamer carbonate 800 milliGRAM(s) Oral three times a day with meals  tamsulosin 0.4 milliGRAM(s) Oral at bedtime  torsemide 10 milliGRAM(s) Oral <User Schedule>    MEDICATIONS  (PRN):  acetaminophen     Tablet .. 650 milliGRAM(s) Oral every 6 hours PRN Temp greater or equal to 38C (100.4F), Mild Pain (1 - 3)  ALPRAZolam 0.5 milliGRAM(s) Oral every 8 hours PRN anxiety  aluminum hydroxide/magnesium hydroxide/simethicone Suspension 30 milliLiter(s) Oral every 4 hours PRN Dyspepsia  melatonin 3 milliGRAM(s) Oral at bedtime PRN Insomnia  metoprolol tartrate Injectable 5 milliGRAM(s) IV Push every 6 hours PRN HR sustaining > 120  ondansetron Injectable 4 milliGRAM(s) IV Push every 8 hours PRN Nausea and/or Vomiting      Allergies    penicillin (Other)    Intolerances        Vital Signs Last 24 Hrs  T(C): 36.1 (10 Nov 2024 08:36), Max: 36.9 (09 Nov 2024 14:05)  T(F): 97 (10 Nov 2024 08:36), Max: 98.5 (09 Nov 2024 14:05)  HR: 83 (10 Nov 2024 08:36) (83 - 129)  BP: 149/77 (10 Nov 2024 08:36) (138/68 - 193/72)  BP(mean): --  RR: 18 (10 Nov 2024 08:36) (18 - 18)  SpO2: 99% (10 Nov 2024 08:36) (97% - 100%)    Parameters below as of 10 Nov 2024 08:36  Patient On (Oxygen Delivery Method): room air          PHYSICAL EXAM:    General: Well developed; in no acute distress  HEENT: MMM, conjunctiva pink and sclera anicteric.  Lungs: clear to auscultation bilaterally.  Cor: RRR S1, S2 only.  Gastrointestinal: Abdomen: Soft non-tender non-distended; Normal bowel sounds; No hepatosplenomegaly.  Extremities: no cyanosis, clubbing or edema.  Skin: Warm and dry. No obvious rash  Neuro: Pt. a + o x 3.    LABS:  CBC Full  -  ( 10 Nov 2024 05:17 )  WBC Count : 8.80 K/uL  RBC Count : 2.60 M/uL  Hemoglobin : 7.8 g/dL  Hematocrit : 24.5 %  Platelet Count - Automated : 157 K/uL  Mean Cell Volume : 94.2 fl  Mean Cell Hemoglobin : 30.0 pg  Mean Cell Hemoglobin Concentration : 31.8 g/dL  Auto Neutrophil # : x  Auto Lymphocyte # : x  Auto Monocyte # : x  Auto Eosinophil # : x  Auto Basophil # : x  Auto Neutrophil % : x  Auto Lymphocyte % : x  Auto Monocyte % : x  Auto Eosinophil % : x  Auto Basophil % : x    11-10    138  |  100  |  23.8[H]  ----------------------------<  113[H]  3.6   |  26.0  |  4.63[H]    Ca    7.5[L]      10 Nov 2024 05:17  Phos  2.1     11-10  Mg     1.8     11-10      PT/INR - ( 09 Nov 2024 05:54 )   PT: 14.9 sec;   INR: 1.29 ratio               Urinalysis Basic - ( 10 Nov 2024 05:17 )    Color: x / Appearance: x / SG: x / pH: x  Gluc: 113 mg/dL / Ketone: x  / Bili: x / Urobili: x   Blood: x / Protein: x / Nitrite: x   Leuk Esterase: x / RBC: x / WBC x   Sq Epi: x / Non Sq Epi: x / Bacteria: x                RADIOLOGY & ADDITIONAL STUDIES (The following images were personally reviewed):

## 2024-11-10 NOTE — CONSULT NOTE ADULT - ASSESSMENT
63 y/o M with hx of atrial fibrillation  s/p ablation 9/2021, s/p Watchman procedure 8/2/2024, ESRD, coronary artery disease with bypass surgery, aortic and mitral valve replacements aortic dissection, bowel ischemia with bowel resection who presented with dyspnea after missing the prior two hemodialysis sessions and missing his medications. Patient admitted for hypertensive urgency 2/2 volume overload 2/2 missed hemodialysis. Hemodialysis was resumed. Found to be FOBT positive. Cardiology consulted for risk stratification      
ESRD: fluid overload, +PVC  Noncompliance issues contributory  - will arrange for HD today with UF as tolerates    Anemia: +CKD  - add REINALDO at HD  - check Fe stores  - consider PRBCs    RO:  - check serum phos      
63 yo male with PMH of paroxysmal atrial fibrillation/flutter (s/p ablation, s/p watchman), BPH, seizure disorder, HTN, HLD, CAD s/p CABG x 2, AVR, MVR 11/2020 with Dr. 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 GI consulted after rectal bleeding.     Rectal bleeding  Prior colonoscopy 06/2024: Neovascular at the anastomoses (s/p partial colectomy). No AVM  Reported rectal bleeding 2-3 weeks, hgb dropped to 7.0 from baseline 8.0 to 9.0) appropriate response to transfusion  Cont to trend hemoglobin, transfuse as needed  Will plan for colonoscopy Monday as patient ate regular diet this morning  Need to be on clear liquids day prior to the procedure  Maintain current  T&S, INR <1.5, Hgb >7.0, Plt >50 prior to procedure  Further care per primary team

## 2024-11-11 ENCOUNTER — TRANSCRIPTION ENCOUNTER (OUTPATIENT)
Age: 64
End: 2024-11-11

## 2024-11-11 LAB
ANION GAP SERPL CALC-SCNC: 14 MMOL/L — SIGNIFICANT CHANGE UP (ref 5–17)
BASOPHILS # BLD AUTO: 0.06 K/UL — SIGNIFICANT CHANGE UP (ref 0–0.2)
BASOPHILS NFR BLD AUTO: 0.9 % — SIGNIFICANT CHANGE UP (ref 0–2)
BUN SERPL-MCNC: 25.2 MG/DL — HIGH (ref 8–20)
CALCIUM SERPL-MCNC: 7.8 MG/DL — LOW (ref 8.4–10.5)
CHLORIDE SERPL-SCNC: 99 MMOL/L — SIGNIFICANT CHANGE UP (ref 96–108)
CO2 SERPL-SCNC: 22 MMOL/L — SIGNIFICANT CHANGE UP (ref 22–29)
CREAT SERPL-MCNC: 5.82 MG/DL — HIGH (ref 0.5–1.3)
EGFR: 10 ML/MIN/1.73M2 — LOW
EOSINOPHIL # BLD AUTO: 0.65 K/UL — HIGH (ref 0–0.5)
EOSINOPHIL NFR BLD AUTO: 9.3 % — HIGH (ref 0–6)
GLUCOSE SERPL-MCNC: 83 MG/DL — SIGNIFICANT CHANGE UP (ref 70–99)
HCT VFR BLD CALC: 25.3 % — LOW (ref 39–50)
HGB BLD-MCNC: 8 G/DL — LOW (ref 13–17)
IMM GRANULOCYTES NFR BLD AUTO: 0.3 % — SIGNIFICANT CHANGE UP (ref 0–0.9)
INR BLD: 1.24 RATIO — HIGH (ref 0.85–1.16)
LYMPHOCYTES # BLD AUTO: 1.48 K/UL — SIGNIFICANT CHANGE UP (ref 1–3.3)
LYMPHOCYTES # BLD AUTO: 21.1 % — SIGNIFICANT CHANGE UP (ref 13–44)
MCHC RBC-ENTMCNC: 29.9 PG — SIGNIFICANT CHANGE UP (ref 27–34)
MCHC RBC-ENTMCNC: 31.6 G/DL — LOW (ref 32–36)
MCV RBC AUTO: 94.4 FL — SIGNIFICANT CHANGE UP (ref 80–100)
MONOCYTES # BLD AUTO: 0.66 K/UL — SIGNIFICANT CHANGE UP (ref 0–0.9)
MONOCYTES NFR BLD AUTO: 9.4 % — SIGNIFICANT CHANGE UP (ref 2–14)
NEUTROPHILS # BLD AUTO: 4.15 K/UL — SIGNIFICANT CHANGE UP (ref 1.8–7.4)
NEUTROPHILS NFR BLD AUTO: 59 % — SIGNIFICANT CHANGE UP (ref 43–77)
PLATELET # BLD AUTO: 160 K/UL — SIGNIFICANT CHANGE UP (ref 150–400)
POTASSIUM SERPL-MCNC: 3.7 MMOL/L — SIGNIFICANT CHANGE UP (ref 3.5–5.3)
POTASSIUM SERPL-SCNC: 3.7 MMOL/L — SIGNIFICANT CHANGE UP (ref 3.5–5.3)
PROTHROM AB SERPL-ACNC: 14.3 SEC — HIGH (ref 9.9–13.4)
RBC # BLD: 2.68 M/UL — LOW (ref 4.2–5.8)
RBC # FLD: 15.9 % — HIGH (ref 10.3–14.5)
SODIUM SERPL-SCNC: 135 MMOL/L — SIGNIFICANT CHANGE UP (ref 135–145)
WBC # BLD: 7.02 K/UL — SIGNIFICANT CHANGE UP (ref 3.8–10.5)
WBC # FLD AUTO: 7.02 K/UL — SIGNIFICANT CHANGE UP (ref 3.8–10.5)

## 2024-11-11 PROCEDURE — 45378 DIAGNOSTIC COLONOSCOPY: CPT

## 2024-11-11 PROCEDURE — 99232 SBSQ HOSP IP/OBS MODERATE 35: CPT

## 2024-11-11 DEVICE — NAIL OSTEO 1.5X16MM STRL: Type: IMPLANTABLE DEVICE | Status: FUNCTIONAL

## 2024-11-11 RX ADMIN — CHLORHEXIDINE GLUCONATE 1 APPLICATION(S): 40 SOLUTION TOPICAL at 05:13

## 2024-11-11 RX ADMIN — CARVEDILOL 6.25 MILLIGRAM(S): 25 TABLET, FILM COATED ORAL at 17:36

## 2024-11-11 RX ADMIN — LEVETIRACETAM 1000 MILLIGRAM(S): 500 TABLET, FILM COATED ORAL at 17:34

## 2024-11-11 RX ADMIN — TORSEMIDE 10 MILLIGRAM(S): 100 TABLET ORAL at 08:13

## 2024-11-11 RX ADMIN — SEVELAMER CARBONATE 800 MILLIGRAM(S): 800 TABLET, FILM COATED ORAL at 17:35

## 2024-11-11 RX ADMIN — Medication 0.5 MILLIGRAM(S): at 21:21

## 2024-11-11 RX ADMIN — Medication 81 MILLIGRAM(S): at 11:23

## 2024-11-11 RX ADMIN — CARVEDILOL 6.25 MILLIGRAM(S): 25 TABLET, FILM COATED ORAL at 05:13

## 2024-11-11 RX ADMIN — CLONIDINE HYDROCHLORIDE 0.1 MILLIGRAM(S): 0.2 TABLET ORAL at 05:12

## 2024-11-11 RX ADMIN — CLONIDINE HYDROCHLORIDE 0.1 MILLIGRAM(S): 0.2 TABLET ORAL at 17:34

## 2024-11-11 RX ADMIN — Medication 1 TABLET(S): at 11:23

## 2024-11-11 RX ADMIN — ISOSORBIDE MONONITRATE 30 MILLIGRAM(S): 60 TABLET, EXTENDED RELEASE ORAL at 11:23

## 2024-11-11 RX ADMIN — Medication 3 MILLIGRAM(S): at 22:52

## 2024-11-11 RX ADMIN — Medication 0.5 MILLIGRAM(S): at 01:56

## 2024-11-11 RX ADMIN — SEVELAMER CARBONATE 800 MILLIGRAM(S): 800 TABLET, FILM COATED ORAL at 08:13

## 2024-11-11 RX ADMIN — Medication 5 MILLIGRAM(S): at 05:14

## 2024-11-11 RX ADMIN — Medication 40 MILLIGRAM(S): at 21:20

## 2024-11-11 RX ADMIN — LEVETIRACETAM 1000 MILLIGRAM(S): 500 TABLET, FILM COATED ORAL at 05:12

## 2024-11-11 RX ADMIN — Medication 0.4 MILLIGRAM(S): at 21:20

## 2024-11-11 RX ADMIN — HYDRALAZINE HYDROCHLORIDE 50 MILLIGRAM(S): 50 TABLET, FILM COATED ORAL at 05:12

## 2024-11-11 RX ADMIN — HYDRALAZINE HYDROCHLORIDE 50 MILLIGRAM(S): 50 TABLET, FILM COATED ORAL at 17:35

## 2024-11-11 NOTE — PROGRESS NOTE ADULT - ASSESSMENT
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  Pt has missed his last several dialysis sessions    ESRD: fluid overload, +PVC--> improved  Noncompliance issues contributory  - will cont HD on TTS schedule dialysis tomorrow will use LUE AVG    Anemia: +CKD  - cont REINALDO at HD  - iron stores are adequate    RO:  - phos noted    Will follow

## 2024-11-11 NOTE — PROGRESS NOTE ADULT - SUBJECTIVE AND OBJECTIVE BOX
SUBJECTIVE:    Chief Complaint: Patient is a 64y old  Male who presents with a chief complaint of GI Bleed (10 Nov 2024 09:31)      INTERVAL HPI/OVERNIGHT EVENTS: Patient seen and examined at bedside at AM. No clinically acute event overnight. Denies any chest pain, palpitations, SOB, leg edema, N/V/D, or any other complaints.     MEDICATIONS  (STANDING):  amLODIPine   Tablet 5 milliGRAM(s) Oral daily  aspirin enteric coated 81 milliGRAM(s) Oral daily  atorvastatin 40 milliGRAM(s) Oral at bedtime  carvedilol 6.25 milliGRAM(s) Oral every 12 hours  chlorhexidine 2% Cloths 1 Application(s) Topical <User Schedule>  cloNIDine 0.1 milliGRAM(s) Oral two times a day  epoetin yolanda-epbx (RETACRIT) Injectable 28332 Unit(s) IV Push <User Schedule>  hydrALAZINE 50 milliGRAM(s) Oral two times a day  isosorbide   mononitrate ER Tablet (IMDUR) 30 milliGRAM(s) Oral daily  levETIRAcetam 1000 milliGRAM(s) Oral two times a day  multivitamin 1 Tablet(s) Oral daily  Nephro-jeanie 1 Tablet(s) Oral daily  sevelamer carbonate 800 milliGRAM(s) Oral three times a day with meals  tamsulosin 0.4 milliGRAM(s) Oral at bedtime  torsemide 10 milliGRAM(s) Oral <User Schedule>    MEDICATIONS  (PRN):  acetaminophen     Tablet .. 650 milliGRAM(s) Oral every 6 hours PRN Temp greater or equal to 38C (100.4F), Mild Pain (1 - 3)  ALPRAZolam 0.5 milliGRAM(s) Oral every 8 hours PRN anxiety  aluminum hydroxide/magnesium hydroxide/simethicone Suspension 30 milliLiter(s) Oral every 4 hours PRN Dyspepsia  melatonin 3 milliGRAM(s) Oral at bedtime PRN Insomnia  metoprolol tartrate Injectable 5 milliGRAM(s) IV Push every 6 hours PRN HR sustaining > 120  ondansetron Injectable 4 milliGRAM(s) IV Push every 8 hours PRN Nausea and/or Vomiting      Allergies    penicillin (Other)    Intolerances        REVIEW OF SYSTEMS:  All other review of systems negative, except as noted in HPI    OBJECTIVE:    Vital Signs Last 24 Hrs  T(C): 36.3 (11 Nov 2024 04:35), Max: 36.8 (11 Nov 2024 00:38)  T(F): 97.3 (11 Nov 2024 04:35), Max: 98.3 (11 Nov 2024 00:38)  HR: 79 (11 Nov 2024 04:35) (79 - 83)  BP: 154/69 (11 Nov 2024 04:35) (119/68 - 154/69)  BP(mean): --  RR: 18 (11 Nov 2024 04:35) (17 - 18)  SpO2: 98% (11 Nov 2024 04:35) (97% - 98%)    Parameters below as of 11 Nov 2024 04:35  Patient On (Oxygen Delivery Method): room air        PHYSICAL EXAM:  GENERAL: NAD, sitting up in chair comfortably  HEAD:  Atraumatic, Normocephalic  EYES: EOMI, PERRLA, conjunctiva and sclera clear  ENT: Moist mucous membranes  CHEST/LUNG: Clear to auscultation bilaterally; No rales, rhonchi, wheezing, or rubs. Unlabored respirations  HEART: Regular rate and rhythm  ABDOMEN: BSx4; Soft, nontender, nondistended  EXTREMITIES:  2+ Peripheral Pulses, brisk capillary refill. No edema. LUE AVF good thrill  NERVOUS SYSTEM:  A&Ox3, no focal deficits       Lab/ Imaging:    LABS:                        8.0    7.02  )-----------( 160      ( 11 Nov 2024 05:28 )             25.3     11-11    135  |  99  |  25.2[H]  ----------------------------<  83  3.7   |  22.0  |  5.82[H]    Ca    7.8[L]      11 Nov 2024 05:28  Phos  2.1     11-10  Mg     1.8     11-10      PT/INR - ( 11 Nov 2024 05:28 )   PT: 14.3 sec;   INR: 1.24 ratio           Urinalysis Basic - ( 11 Nov 2024 05:28 )    Color: x / Appearance: x / SG: x / pH: x  Gluc: 83 mg/dL / Ketone: x  / Bili: x / Urobili: x   Blood: x / Protein: x / Nitrite: x   Leuk Esterase: x / RBC: x / WBC x   Sq Epi: x / Non Sq Epi: x / Bacteria: x

## 2024-11-11 NOTE — PROGRESS NOTE ADULT - ATTENDING COMMENTS
I have personally seen and examined patient on the above date.  I discussed the case with Dr. Montgomery and I agree with findings and plan as detailed per note above, which I have amended where appropriate.      GI Bleed  Suspected Lower GI bleed  - colonoscopy today    ESRD  - dialysis tomorrow through graft  - then likely d/c temporarily dialysis catheter by vascular
Agree with above   Patient seen and examined together with medical residents   Vital signs,  labs and imaging  reviewed   Assesment and plan discussed     64M with a history of atrial fibrillation, ESRD, coronary artery disease with bypass surgery, aortic and mitral valve replacements aortic dissection, bowel ischemia with bowel resection who presented with dyspnea after missing the prior two hemodialysis    #Dyspnea/hypertensive urgency 2/2 volume overload in setting of ESRD and missed hemodialysis  Has been receiving HD w r chest PC but next HD plan to use LUE AVF cleared by [facility] for use  -From renal persepctive he is stable    #Normocytic anemia 2/2 chronic disease and GIB  - having bright blood in stool, last BM (11/8) reportedly brown  -Hgb trending down, s/p 1 unit PRBC-->8.5-->8.0  - Prior colonoscopy 06/2024: Neovascular at the anastomoses (s/p partial colectomy). No AVM  -On ASA and Plavix- patient reports he doesn't think he takes plavix but is not sure. On Dr Bloom states last filled in October 2024. Indications for DAPT may have been CAD but last stent in 1998, possible CVA on ASA? reports he has had 2 CVAs last one 2 years ago. As per GI okay to continue DAPT  for now  -C scope planned for monday. Clears for sunday, bowel prep, NPO after MN    Dispo- Reassess after C scope on Monday

## 2024-11-11 NOTE — PROGRESS NOTE ADULT - ASSESSMENT
64M PMHx atrial fibrillation, ESRD, coronary artery disease with bypass surgery, aortic and mitral valve replacements aortic dissection, bowel ischemia with bowel resection who presented with dyspnea after missing the prior two hemodialysis sessions and missing his medications. Patient admitted for hypertensive urgency 2/2 volume overload 2/2 missed hemodialysis. Hemodialysis was resumed.       Hypertensive Urgency/SOB secondary to Volume overload, non-cardiogenic pulmonary edema, missed dialysis   - Dialysis Tues/Thurs/Sat via permacath, has left AVF but does not use it  - BNP significantly elevated 2/2 missing hemodialysis sessions and poor compliance with medications  - Improved after hemodialysis, not requiring supplemental oxygen  - CTA chest: No pulmonary embolus  - Last dialysis 11/7, 0 L removed, planned for dialysis 11/9   - USS Doppler showing good flow within the AVF. Cleared by Vascular to use AVF for next HD session.  - Will need PermCath removed prior to discharge after successful HD through LUE AVF. Vascular surgery aware.  - Nephrology following    Normocytic anemia 2/2 chronic disease and GIB  - 2 bright red bloody BMs during admission  - hemoglobin remains low but stable   - Required 1 U PRBC, hemodynamically stable   - C/w EPO   - Prior colonoscopy 06/2024: Neovascular at the anastomoses (s/p partial colectomy). No AVM  - Planned for colonoscopy today  - GI recommending not to hold aspirin  - Monitor H/H closely  - Maintain active type and screen, two large bore IVs  - Transfuse to maintain Hgb >7    Chronic Atrial fibrillation  - Noted to have a history of prior ablation and atrial appendage closure  - On Coreg    Coronary artery disease / Hypertension  - On aspirin and atorvastatin  - Plavix stopped by cardiology  - Troponin elevation due to demand ischemia from fluid overload and chronic kidney disease  - Close blood pressure monitoring  - Continue on amlodipine, carvedilol, clonidine, hydralazine, and isosorbide    Aortic dissection  - CT chest and abdomen chronic aortic dissection  - Continue with antihypertensive medications    Enlarged mediastinal and right hilar nodes  - Noted on CT of the chest  - For outpatient follow up for repeat CT chest or PET/CT to monitor    Seizures  - On levetiracetam    Dispo: D/c home after HD session and PermCath removal, likely tomorrow. Pending Colonoscopy.

## 2024-11-11 NOTE — PROGRESS NOTE ADULT - SUBJECTIVE AND OBJECTIVE BOX
NEPHROLOGY INTERVAL HPI/OVERNIGHT EVENTS:    Examined no distress  Feels fine   denies HA CP no SOB    MEDICATIONS  (STANDING):  amLODIPine   Tablet 5 milliGRAM(s) Oral daily  aspirin enteric coated 81 milliGRAM(s) Oral daily  atorvastatin 40 milliGRAM(s) Oral at bedtime  carvedilol 6.25 milliGRAM(s) Oral every 12 hours  chlorhexidine 2% Cloths 1 Application(s) Topical <User Schedule>  cloNIDine 0.1 milliGRAM(s) Oral two times a day  epoetin yolanda-epbx (RETACRIT) Injectable 33075 Unit(s) IV Push <User Schedule>  hydrALAZINE 50 milliGRAM(s) Oral two times a day  isosorbide   mononitrate ER Tablet (IMDUR) 30 milliGRAM(s) Oral daily  levETIRAcetam 1000 milliGRAM(s) Oral two times a day  multivitamin 1 Tablet(s) Oral daily  Nephro-jeanie 1 Tablet(s) Oral daily  sevelamer carbonate 800 milliGRAM(s) Oral three times a day with meals  tamsulosin 0.4 milliGRAM(s) Oral at bedtime  torsemide 10 milliGRAM(s) Oral <User Schedule>    MEDICATIONS  (PRN):  acetaminophen     Tablet .. 650 milliGRAM(s) Oral every 6 hours PRN Temp greater or equal to 38C (100.4F), Mild Pain (1 - 3)  ALPRAZolam 0.5 milliGRAM(s) Oral every 8 hours PRN anxiety  aluminum hydroxide/magnesium hydroxide/simethicone Suspension 30 milliLiter(s) Oral every 4 hours PRN Dyspepsia  melatonin 3 milliGRAM(s) Oral at bedtime PRN Insomnia  metoprolol tartrate Injectable 5 milliGRAM(s) IV Push every 6 hours PRN HR sustaining > 120  ondansetron Injectable 4 milliGRAM(s) IV Push every 8 hours PRN Nausea and/or Vomiting      Allergies    penicillin (Other)    Intolerances        Vital Signs Last 24 Hrs  T(C): 36.2 (2024 07:30), Max: 36.8 (2024 00:38)  T(F): 97.2 (2024 07:30), Max: 98.3 (2024 00:38)  HR: 78 (2024 07:30) (78 - 83)  BP: 108/64 (2024 07:30) (108/64 - 154/69)  BP(mean): --  RR: 18 (:30) (17 - 18)  SpO2: 97% (:30) (97% - 98%)    Parameters below as of :30  Patient On (Oxygen Delivery Method): room air      Daily     Daily Weight in k.3 (2024 04:35)    PHYSICAL EXAM:  GENERAL: NAD  HEAD: NCAT  NECK: Supple, No JVD  NERVOUS SYSTEM:  Alert & Oriented X3  CHEST/LUNG: Clear, diminished BS  HEART: No rub  ABDOMEN: Soft, Nontender, +BS  EXTREMITIES:  + dependent edema trace    LABS:                        8.0    7.02  )-----------( 160      ( 2024 05:28 )             25.3         135  |  99  |  25.2[H]  ----------------------------<  83  3.7   |  22.0  |  5.82[H]    Ca    7.8[L]      2024 05:28  Phos  2.1     11-10  Mg     1.8     11-10      PT/INR - ( 2024 05: )   PT: 14.3 sec;   INR: 1.24 ratio           Urinalysis Basic - ( 2024 05: )    Color: x / Appearance: x / SG: x / pH: x  Gluc: 83 mg/dL / Ketone: x  / Bili: x / Urobili: x   Blood: x / Protein: x / Nitrite: x   Leuk Esterase: x / RBC: x / WBC x   Sq Epi: x / Non Sq Epi: x / Bacteria: x              RADIOLOGY & ADDITIONAL TESTS:

## 2024-11-11 NOTE — PROGRESS NOTE ADULT - SUBJECTIVE AND OBJECTIVE BOX
Vascular Surgery follow up  brief PA note    seen and evaluated with surgical attending  labs/vitals reviewed  Patency of patient's left avg confirmed    --- IT was again strongly advised to the patient to begin to utilize his graft for HD  -- please recall our team after HD session, so the right IJ permacath can be removed

## 2024-11-12 ENCOUNTER — TRANSCRIPTION ENCOUNTER (OUTPATIENT)
Age: 64
End: 2024-11-12

## 2024-11-12 VITALS
SYSTOLIC BLOOD PRESSURE: 148 MMHG | DIASTOLIC BLOOD PRESSURE: 78 MMHG | RESPIRATION RATE: 18 BRPM | OXYGEN SATURATION: 100 % | HEART RATE: 97 BPM | TEMPERATURE: 98 F

## 2024-11-12 PROCEDURE — 99232 SBSQ HOSP IP/OBS MODERATE 35: CPT | Mod: FS

## 2024-11-12 PROCEDURE — 99239 HOSP IP/OBS DSCHRG MGMT >30: CPT

## 2024-11-12 RX ORDER — SEVELAMER CARBONATE 800 MG/1
1 TABLET, FILM COATED ORAL
Qty: 90 | Refills: 0
Start: 2024-11-12 | End: 2024-12-11

## 2024-11-12 RX ORDER — CLONIDINE HYDROCHLORIDE 0.2 MG/1
1 TABLET ORAL
Qty: 60 | Refills: 0
Start: 2024-11-12 | End: 2024-12-11

## 2024-11-12 RX ORDER — AMLODIPINE BESYLATE 10 MG
1 TABLET ORAL
Qty: 30 | Refills: 0
Start: 2024-11-12 | End: 2024-12-11

## 2024-11-12 RX ORDER — LEVETIRACETAM 500 MG/1
1 TABLET, FILM COATED ORAL
Qty: 0 | Refills: 0 | DISCHARGE
Start: 2024-11-12

## 2024-11-12 RX ORDER — ISOSORBIDE MONONITRATE 60 MG/1
1 TABLET, EXTENDED RELEASE ORAL
Qty: 0 | Refills: 0 | DISCHARGE
Start: 2024-11-12

## 2024-11-12 RX ORDER — TAMSULOSIN HCL 0.4 MG
1 CAPSULE ORAL
Qty: 0 | Refills: 0 | DISCHARGE
Start: 2024-11-12

## 2024-11-12 RX ORDER — CARVEDILOL 25 MG/1
1 TABLET, FILM COATED ORAL
Qty: 0 | Refills: 0 | DISCHARGE
Start: 2024-11-12

## 2024-11-12 RX ORDER — ALPRAZOLAM 0.25 MG
0.25 TABLET ORAL ONCE
Refills: 0 | Status: DISCONTINUED | OUTPATIENT
Start: 2024-11-12 | End: 2024-11-12

## 2024-11-12 RX ADMIN — ISOSORBIDE MONONITRATE 30 MILLIGRAM(S): 60 TABLET, EXTENDED RELEASE ORAL at 12:35

## 2024-11-12 RX ADMIN — HYDRALAZINE HYDROCHLORIDE 50 MILLIGRAM(S): 50 TABLET, FILM COATED ORAL at 17:05

## 2024-11-12 RX ADMIN — SEVELAMER CARBONATE 800 MILLIGRAM(S): 800 TABLET, FILM COATED ORAL at 12:36

## 2024-11-12 RX ADMIN — SEVELAMER CARBONATE 800 MILLIGRAM(S): 800 TABLET, FILM COATED ORAL at 17:05

## 2024-11-12 RX ADMIN — CARVEDILOL 6.25 MILLIGRAM(S): 25 TABLET, FILM COATED ORAL at 17:05

## 2024-11-12 RX ADMIN — LEVETIRACETAM 1000 MILLIGRAM(S): 500 TABLET, FILM COATED ORAL at 17:05

## 2024-11-12 RX ADMIN — Medication 1 TABLET(S): at 12:35

## 2024-11-12 RX ADMIN — Medication 1 TABLET(S): at 12:36

## 2024-11-12 RX ADMIN — Medication 0.25 MILLIGRAM(S): at 02:20

## 2024-11-12 RX ADMIN — CLONIDINE HYDROCHLORIDE 0.1 MILLIGRAM(S): 0.2 TABLET ORAL at 17:05

## 2024-11-12 RX ADMIN — ERYTHROPOIETIN 10000 UNIT(S): 10000 INJECTION, SOLUTION INTRAVENOUS; SUBCUTANEOUS at 10:18

## 2024-11-12 RX ADMIN — CHLORHEXIDINE GLUCONATE 1 APPLICATION(S): 40 SOLUTION TOPICAL at 06:25

## 2024-11-12 RX ADMIN — Medication 81 MILLIGRAM(S): at 12:36

## 2024-11-12 NOTE — DISCHARGE NOTE PROVIDER - NSDCMRMEDTOKEN_GEN_ALL_CORE_FT
amLODIPine 5 mg oral tablet: 1 tab(s) orally once a day  Aspi-Cor 81 mg oral delayed release tablet: 1 tab(s) orally once a day  atorvastatin 40 mg oral tablet: 1 tab(s) orally once a day (at bedtime)  carvedilol 6.25 mg oral tablet: 1 tab(s) orally every 12 hours  cloNIDine 0.1 mg oral tablet: 1 tab(s) orally 2 times a day  hydrALAZINE 50 mg oral tablet: 1 tab(s) orally 2 times a day  isosorbide mononitrate 30 mg oral tablet, extended release: 1 tab(s) orally once a day  levETIRAcetam 1000 mg oral tablet: 1 tab(s) orally 2 times a day  Nephro-Russel oral tablet: 1 tab(s) orally once a day  sevelamer carbonate 800 mg oral tablet: 1 tab(s) orally 3 times a day (with meals)  tamsulosin 0.4 mg oral capsule: 1 cap(s) orally once a day (at bedtime)  torsemide 10 mg oral tablet: 1 tab(s) orally every other day

## 2024-11-12 NOTE — PROGRESS NOTE ADULT - ASSESSMENT
63 yo male with PMH of paroxysmal atrial fibrillation/flutter (s/p ablation, s/p watchman), BPH, seizure disorder, HTN, HLD, CAD s/p CABG x 2, AVR, MVR 11/2020 with Dr. 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 GI consulted after rectal bleeding.     #Rectal bleeding  #CAD s/p CABG x2, AVR, MVR  #aortic dissection; chronic   colonoscopy (11/11/24) Rectum with poor preparation with solid stool. The end-to-side ileorectal anastomosis site was located 10cm from anal verge. There was no active bleeding, however anastomosis site was noted to have neovascularization.  -Cont to trend hemoglobin, transfuse as needed, monitor for bleeding   -regular diet as tolerated  -Ok to resume AC or antiplatelets   -Further care per primary team   GI will sign off. Call if questions.   _________________________________________________________________  Assessment and recommendations are final when note is signed by the attending physician.

## 2024-11-12 NOTE — PROGRESS NOTE ADULT - NS ATTEND AMEND GEN_ALL_CORE FT
I agree with assessment and plan as above. Pt with history of subtotal colectomy, GI consulted due to rectal bleeding. Now s/p colonoscopy with finding of neovascularization of at the anastomotic site, no active bleeding. Okay to resume anticoagulation if indicated. If recurrence of rectal bleeding can consider treatment of this area with APC. If no recurrence of rectal bleeding, okay to discharge from GI perspective.
I evaluated this pt. with my ACP and agree with the above assessment and management plan. For colonoscopy Monday which is a non dialysis day for him. for further evaluation of symptomatic anemia secondary to rectal bleeding. Hb stable. Still has some BRB with BM's. Likely diverticular in etiology. Repeat labs ordered for the AM. Will need vascular and cardiac clearances for colonoscopy Monday. I reviewed his CT images and agree with the radiologist's interpretation.

## 2024-11-12 NOTE — PROGRESS NOTE ADULT - SUBJECTIVE AND OBJECTIVE BOX
NEPHROLOGY INTERVAL HPI/OVERNIGHT EVENTS:    Examined earlier on HD using AVG    MEDICATIONS  (STANDING):  amLODIPine   Tablet 5 milliGRAM(s) Oral daily  aspirin enteric coated 81 milliGRAM(s) Oral daily  atorvastatin 40 milliGRAM(s) Oral at bedtime  carvedilol 6.25 milliGRAM(s) Oral every 12 hours  chlorhexidine 2% Cloths 1 Application(s) Topical <User Schedule>  cloNIDine 0.1 milliGRAM(s) Oral two times a day  epoetin yolanda-epbx (RETACRIT) Injectable 53803 Unit(s) IV Push <User Schedule>  hydrALAZINE 50 milliGRAM(s) Oral two times a day  isosorbide   mononitrate ER Tablet (IMDUR) 30 milliGRAM(s) Oral daily  levETIRAcetam 1000 milliGRAM(s) Oral two times a day  multivitamin 1 Tablet(s) Oral daily  Nephro-jeanie 1 Tablet(s) Oral daily  sevelamer carbonate 800 milliGRAM(s) Oral three times a day with meals  tamsulosin 0.4 milliGRAM(s) Oral at bedtime  torsemide 10 milliGRAM(s) Oral <User Schedule>    MEDICATIONS  (PRN):  acetaminophen     Tablet .. 650 milliGRAM(s) Oral every 6 hours PRN Temp greater or equal to 38C (100.4F), Mild Pain (1 - 3)  ALPRAZolam 0.5 milliGRAM(s) Oral every 8 hours PRN anxiety  aluminum hydroxide/magnesium hydroxide/simethicone Suspension 30 milliLiter(s) Oral every 4 hours PRN Dyspepsia  melatonin 3 milliGRAM(s) Oral at bedtime PRN Insomnia  metoprolol tartrate Injectable 5 milliGRAM(s) IV Push every 6 hours PRN HR sustaining > 120  ondansetron Injectable 4 milliGRAM(s) IV Push every 8 hours PRN Nausea and/or Vomiting      Allergies    penicillin (Other)    Intolerances        Vital Signs Last 24 Hrs  T(C): 36.3 (2024 11:23), Max: 36.9 (2024 20:10)  T(F): 97.4 (2024 11:23), Max: 98.4 (2024 20:10)  HR: 81 (2024 11:23) (79 - 94)  BP: 139/63 (2024 11:23) (105/57 - 168/74)  BP(mean): 73 (2024 20:10) (73 - 73)  RR: 18 (2024 11:23) (16 - 18)  SpO2: 99% (2024 11:23) (97% - 99%)    Parameters below as of 2024 11:23  Patient On (Oxygen Delivery Method): room air      Daily     Daily Weight in k.3 (2024 11:23)    PHYSICAL EXAM:  GENERAL: Appears chronically ill  HEAD: Atraumatic, Normocephalic  NECK: Supple, No JVD  NERVOUS SYSTEM:  Alert & Oriented X3  CHEST/LUNG: Clear, diminished BS  HEART: No rub  ABDOMEN: Soft, Nontender, +BS  EXTREMITIES:  + dependent edema trace      LABS:                        8.0    7.02  )-----------( 160      ( 2024 05:28 )             25.3     11    135  |  99  |  25.2[H]  ----------------------------<  83  3.7   |  22.0  |  5.82[H]    Ca    7.8[L]      2024 05:28      PT/INR - ( 2024 05:28 )   PT: 14.3 sec;   INR: 1.24 ratio           Urinalysis Basic - ( 2024 05:28 )    Color: x / Appearance: x / SG: x / pH: x  Gluc: 83 mg/dL / Ketone: x  / Bili: x / Urobili: x   Blood: x / Protein: x / Nitrite: x   Leuk Esterase: x / RBC: x / WBC x   Sq Epi: x / Non Sq Epi: x / Bacteria: x              RADIOLOGY & ADDITIONAL TESTS:

## 2024-11-12 NOTE — DISCHARGE NOTE PROVIDER - HOSPITAL COURSE
. 64/M PMHx A-fib, ESRD, CAD (s/p CABG), aortic & mitral valve replacement, aortic dissection, bowel ischemia with bowel resection who initially presented to the ED after missing 2 sessions of dialysi with dyspnea.  Admitted to medicine for hypertensive emergency in the setting of volume overload due to missed dialysis session.  Hospital course complicated by lower GI bleed (bright red blood per rectum x 2 episodes), requiring 1 unit PRBC.  During the hospital stay patient was dialyzed and resumed on his dialysis schedule.  Gastroenterology was consulted for lower GI bleed, colonoscopy was done which showed internal hemorrhoids & some neovascularization at the site of colonic anastomosis.    Patient is now currently hemodynamically stable for discharge with plans to continue his hemodialysis sessions as outpatient.  And follow-up with nephrology, gastroenterology & primary care physician.

## 2024-11-12 NOTE — PROGRESS NOTE ADULT - PROVIDER SPECIALTY LIST ADULT
Gastroenterology
Hospitalist
Hospitalist
Nephrology
Vascular Surgery
Gastroenterology
Hospitalist
Internal Medicine
Internal Medicine
Nephrology
Gastroenterology
Internal Medicine
Nephrology

## 2024-11-12 NOTE — PROGRESS NOTE ADULT - ASSESSMENT
63 yo male PMH +ESRD (TTS) + HTN + CAD/CABG/AVR/MVR/PAF s/p ablation and Watchman + Type A dissection complicated by bowel ischemia requiring bowel resection +SAH who presented to the ED with SOB and N/V.  Pt has missed his last several dialysis sessions    ESRD: fluid overload, +PVC--> improved  Noncompliance issues contributory, discussed educated  - will cont HD on TTS schedule using AVG ok to remove permcath    Anemia: +CKD  - cont REINALDO at HD  - iron stores are adequate    RO:  - phos noted    Will follow

## 2024-11-12 NOTE — DISCHARGE NOTE PROVIDER - NSDCFUSCHEDAPPT_GEN_ALL_CORE_FT
James Trevino  [facility] Physician [facility]  ELECTROPH [address]  Scheduled Appointment: 11/26/2024    Felice Frankel  [facility] Physician [facility]  CARDIOLOGY [address]  Scheduled Appointment: 12/17/2024

## 2024-11-12 NOTE — PROGRESS NOTE ADULT - SUBJECTIVE AND OBJECTIVE BOX
Chief Complaint:  Patient is a 64y old  Male who presents with a chief complaint of GI bleed (2024 10:26)      HPI/ 24 hr events: Patient seen and examined at dialysis. S/p colonoscopy (24) Rectum with poor preparation with solid stool. The end-to-side ileorectal anastomosis site was located 10cm from anal verge. There was no active bleeding, however anastomosis site was noted to have neovascularization. Pt feeling well this morning. Tolerating diet. Having non bloody BMs. Denies nausea, vomiting, diarrhea, abdominal pain. Vitals are overall stable, no repeat labs this am.       REVIEW OF SYSTEMS:   General: Negative  HEENT: Negative  CV: Negative  Respiratory: Negative  GI: See HPI  : Negative  MSK: Negative  Hematologic: Negative  Skin: Negative    MEDICATIONS:   MEDICATIONS  (STANDING):  amLODIPine   Tablet 5 milliGRAM(s) Oral daily  aspirin enteric coated 81 milliGRAM(s) Oral daily  atorvastatin 40 milliGRAM(s) Oral at bedtime  carvedilol 6.25 milliGRAM(s) Oral every 12 hours  chlorhexidine 2% Cloths 1 Application(s) Topical <User Schedule>  cloNIDine 0.1 milliGRAM(s) Oral two times a day  epoetin yolanda-epbx (RETACRIT) Injectable 16606 Unit(s) IV Push <User Schedule>  hydrALAZINE 50 milliGRAM(s) Oral two times a day  isosorbide   mononitrate ER Tablet (IMDUR) 30 milliGRAM(s) Oral daily  levETIRAcetam 1000 milliGRAM(s) Oral two times a day  multivitamin 1 Tablet(s) Oral daily  Nephro-jeanie 1 Tablet(s) Oral daily  sevelamer carbonate 800 milliGRAM(s) Oral three times a day with meals  tamsulosin 0.4 milliGRAM(s) Oral at bedtime  torsemide 10 milliGRAM(s) Oral <User Schedule>    MEDICATIONS  (PRN):  acetaminophen     Tablet .. 650 milliGRAM(s) Oral every 6 hours PRN Temp greater or equal to 38C (100.4F), Mild Pain (1 - 3)  ALPRAZolam 0.5 milliGRAM(s) Oral every 8 hours PRN anxiety  aluminum hydroxide/magnesium hydroxide/simethicone Suspension 30 milliLiter(s) Oral every 4 hours PRN Dyspepsia  melatonin 3 milliGRAM(s) Oral at bedtime PRN Insomnia  metoprolol tartrate Injectable 5 milliGRAM(s) IV Push every 6 hours PRN HR sustaining > 120  ondansetron Injectable 4 milliGRAM(s) IV Push every 8 hours PRN Nausea and/or Vomiting      Packed Red Cells Order:  1 Unit  Indication: Symptomatic Anemia - e.g. Chest Pain, Orthostasis, Tach  Infuse Unit : 3 Hours (24 @ 07:51)  Packed Red Cells Order:  1 Unit  Indication: Symptomatic Anemia - e.g. Chest Pain, Orthostasis, Tach  Infuse Unit : 30 Minutes --- transfuse during HD (24 @ 07:33)        DIET:  Diet, Regular:   DASH/TLC Sodium & Cholesterol Restricted (DASH)  For patients receiving Renal Replacement - No Protein Restr, No Conc K, No Conc Phos, Low  Sodium (RENAL) (24 @ 15:48) [Active]          ALLERGIES:   Allergies    penicillin (Other)    Intolerances        VITAL SIGNS:   Vital Signs Last 24 Hrs  T(C): 36.3 (2024 11:23), Max: 36.9 (2024 20:10)  T(F): 97.4 (2024 11:23), Max: 98.4 (2024 20:10)  HR: 81 (:23) (79 - 94)  BP: 139/63 (2024 11:23) (105/57 - 168/74)  BP(mean): 73 (2024 20:10) (73 - 73)  RR: 18 (:23) (16 - 18)  SpO2: 99% (2024 11:23) (97% - 99%)    Parameters below as of 2024 11:23  Patient On (Oxygen Delivery Method): room air      I&O's Summary      PHYSICAL EXAM:   GENERAL:  No acute distress  HEENT:  NC/AT, conjunctiva clear, sclera anicteric  CHEST:  No increased effort  HEART:  Regular rate  ABDOMEN:  Soft, non-tender, non-distended, normoactive bowel sounds, no rebound or guarding  EXTREMITIES: No edema  SKIN:  Warm, dry  NEURO:  Calm, cooperative    LABS:                        8.0    7.02  )-----------( 160      ( 2024 05:28 )             25.3     Hemoglobin: 8.0 g/dL (24 @ 05:28)  Hemoglobin: 7.8 g/dL (11-10-24 @ 05:17)        135  |  99  |  25.2[H]  ----------------------------<  83  3.7   |  22.0  |  5.82[H]    Ca    7.8[L]      2024 05:28          PT/INR - ( 2024 05:28 )   PT: 14.3 sec;   INR: 1.24 ratio                                                 RADIOLOGY & ADDITIONAL STUDIES:          Colonoscopy Report        Date: 2024        Patient Name: JONATHAN GIBSON        MRN: [id number]        Account Number:        [id number]        Gender: Male         (age): 1960 (64)        Instrument(s):        PCF [id number]        Attending/Fellow:        FRANC BOATENG MD                Procedure Room #:        PROCEDURE ROOM 1                        ASA Class:        P3 - 2024 2:46 PM FRANC BOATENG MD        History of Present Illness:        Rectal bleeding, anemia, ESRD on HD, aortic dissection, history of bowel    ischemia s/p subtotal colectomy        Administered Medications:        As per Anesthesiology Record        Indications:        Rectal bleedin.3 - K62.5        Procedure:        Prior colonoscopy was performed on 2022.        The procedure, indications, preparation and potential complications were    explained to the patient, who indicated understanding and signed the    corresponding consent forms. MAC was administered by the anesthesiologist.    Continuous pulse oximetry and blood pressure monitoring were used throughout the    procedure. Supplemental oxygen was used. Patient was placed in left lateral    decubitus position. Digital exam was normal. The colonoscope was introduced    through the rectum and advanced under direct visualization until cecum was    reached. The appendiceal orifice and the ileocecal valve were identified. The    terminal ileum was identified. Careful visualization was performed as the    instrument was withdrawn. Patient tolerance to procedure was excellent. The    procedure was not difficult.        [place] Bowel Preparation Score:        Using the [place] Bowel Preparation Score, each segment of the colon was graded    as follows:        Left Colon: Poor, 1 |  |        Limitations/Complications:        There were no apparent limitations or complications        Findings:        Additional findings Anatomy consistent with surgical history of subtotal    colectomy. Rectum with poor preparation with solid stool. The end-to-side    ileorectal anastomosis site was located 10cm from anal verge. There was no    active bleeding, however anastomosis site was noted to have neovascularization.    The colonoscope was passed to 70cm from anal verge, demonstrated normal ileum    without any signs of bleeding. Retroflexion was performed in the rectum and    revealed internal hemorrhoids and some mild scope trauma..        Impressions:        Anatomy consistent with surgical history of subtotal colectomy. Rectum with    poor preparation with solid stool. The end-to-side ileorectal anastomosis site    was located 10cm from anal verge. There was no active bleeding, however    anastomosis site was noted to have neovascularization. The colonoscope was    passed to 70cm from anal verge, demonstrated normal ileum without any signs of    bleeding. Retroflexion was performed in the rectum and revealed internal    hemorrhoids and some mild scope trauma. .        Plan:        Resume diet. Resume anticoagulation. Trend Hgb. Monitor for recurrence of rectal    bleeding.        FRANC BOATENG MD        Version 1, Electronically signed on 2024 3:15:53 PM by FRANC BOATENG MD

## 2024-11-12 NOTE — DISCHARGE NOTE NURSING/CASE MANAGEMENT/SOCIAL WORK - PATIENT PORTAL LINK FT
You can access the FollowMyHealth Patient Portal offered by [facility] by registering at the following website: [email or web address]. By joining [employer]’s FollowMyHealth portal, you will also be able to view your health information using other applications (apps) compatible with our system.

## 2024-11-12 NOTE — CHART NOTE - NSCHARTNOTEFT_GEN_A_CORE
Vascular surgery FOllow up     Brief PA note  Right IJ permacath removed  8cc marcaine used  pressure held for 15 minutes   verbal informed consent obtained prior to removal  Patient tolerated procedure     -- recommend that patient do bedrest for 30-60 minutes  -- have patient follow up in the office to see Dr. VANDANA Crawford

## 2024-11-12 NOTE — DISCHARGE NOTE PROVIDER - NSDCCPCAREPLAN_GEN_ALL_CORE_FT
PRINCIPAL DISCHARGE DIAGNOSIS  Diagnosis: Pulmonary edema, noncardiac  Assessment and Plan of Treatment: Continue dialysis session as per your schedule  - Follow-up with Nephrology      SECONDARY DISCHARGE DIAGNOSES  Diagnosis: Hypertensive emergency  Assessment and Plan of Treatment: Currently BP stable. Continue your medicationsa as instructed    Diagnosis: Lower GI bleed  Assessment and Plan of Treatment: Likely secondary to internal hemarrhoids  Follow-up with gastroenterology

## 2024-11-12 NOTE — DISCHARGE NOTE PROVIDER - NSDCCPTREATMENT_GEN_ALL_CORE_FT
PRINCIPAL PROCEDURE  Procedure: Colonoscopy  Findings and Treatment: Anatomy consistent with surgical history of subtotal colectomy. Rectum with poor preparation with solid stool. The end-to-side ileorectal anastomosis site as located 10cm from anal verge. There was no active bleeding, however anastomosis site was noted to have neovascularization. The colonoscope was passed to 70cm from anal verge, demonstrated normal ileum without any signs ofbleeding. Retroflexion was performed in the rectum and revealed internal  hemorrhoids and some mild scope trauma. .

## 2024-11-12 NOTE — DISCHARGE NOTE NURSING/CASE MANAGEMENT/SOCIAL WORK - NSDCVIVACCINE_GEN_ALL_CORE_FT
influenza, injectable, quadrivalent, preservative free; 12-Dec-2020 09:28; Lynn Gerardo); [place] [employer]; 724k2 (Exp. Date: 30-Jun-2021); IntraMuscular; Deltoid Right.; 0.5 milliLiter(s); VIS (VIS Published: 15-Aug-2019, VIS Presented: 12-Dec-2020);

## 2024-11-12 NOTE — DISCHARGE NOTE NURSING/CASE MANAGEMENT/SOCIAL WORK - FINANCIAL ASSISTANCE
Pt requesting a  referral dr Giselle Mathew [facility] provides services at a reduced cost to those who are determined to be eligible through [facility]’s financial assistance program. Information regarding [facility]’s financial assistance program can be found by going to [email or web address] or by calling [phone].

## 2024-11-25 NOTE — PROGRESS NOTE ADULT - ATTENDING COMMENTS
Recent Visits  Date Type Provider Dept   11/22/24 Office [facility]   08/16/23 Office Visit Johnnie Rowan MD Do [facility]   Showing recent visits within past 540 days and meeting all other requirements  Future Appointments  Date Type Provider Dept   11/29/24 Appointment Johnnie Rowan MD Do [facility]   Showing future appointments within next 180 days and meeting all other requirements     
Estimate date of discharge 10/15/19
Estimate date of discharge undetermined.
I saw and examined the patient, and were physically present for the key portions of the Evaluation and Management service provided. I agree with the above history, physical, and plan which I have reviewed and edited where appropriate.  CC- ileus, emesis, constipation  P/E- aao x3, s1s2 + soft, bs+ nt, nd, no edema, cta [place]  A/P- ileus- npo, iv meds only, surgical consult

## 2024-11-26 ENCOUNTER — APPOINTMENT (OUTPATIENT)
Dept: ELECTROPHYSIOLOGY | Facility: CLINIC | Age: 64
End: 2024-11-26

## 2024-11-26 DIAGNOSIS — Z95.818 PRESENCE OF OTHER CARDIAC IMPLANTS AND GRAFTS: ICD-10-CM

## 2024-11-26 DIAGNOSIS — I48.91 UNSPECIFIED ATRIAL FIBRILLATION: ICD-10-CM

## 2024-11-26 PROCEDURE — 84100 ASSAY OF PHOSPHORUS: CPT

## 2024-11-26 PROCEDURE — 86922 COMPATIBILITY TEST ANTIGLOB: CPT

## 2024-11-26 PROCEDURE — 93990 DOPPLER FLOW TESTING: CPT

## 2024-11-26 PROCEDURE — P9016: CPT

## 2024-11-26 PROCEDURE — 87040 BLOOD CULTURE FOR BACTERIA: CPT

## 2024-11-26 PROCEDURE — 86901 BLOOD TYPING SEROLOGIC RH(D): CPT

## 2024-11-26 PROCEDURE — 74174 CTA ABD&PLVS W/CONTRAST: CPT | Mod: MC

## 2024-11-26 PROCEDURE — 96375 TX/PRO/DX INJ NEW DRUG ADDON: CPT

## 2024-11-26 PROCEDURE — 36415 COLL VENOUS BLD VENIPUNCTURE: CPT

## 2024-11-26 PROCEDURE — 85027 COMPLETE CBC AUTOMATED: CPT

## 2024-11-26 PROCEDURE — 84466 ASSAY OF TRANSFERRIN: CPT

## 2024-11-26 PROCEDURE — 99261: CPT

## 2024-11-26 PROCEDURE — 70450 CT HEAD/BRAIN W/O DYE: CPT | Mod: MC

## 2024-11-26 PROCEDURE — 86900 BLOOD TYPING SEROLOGIC ABO: CPT

## 2024-11-26 PROCEDURE — 85610 PROTHROMBIN TIME: CPT

## 2024-11-26 PROCEDURE — 36430 TRANSFUSION BLD/BLD COMPNT: CPT

## 2024-11-26 PROCEDURE — 86870 RBC ANTIBODY IDENTIFICATION: CPT

## 2024-11-26 PROCEDURE — 84443 ASSAY THYROID STIM HORMONE: CPT

## 2024-11-26 PROCEDURE — 82306 VITAMIN D 25 HYDROXY: CPT

## 2024-11-26 PROCEDURE — 85025 COMPLETE CBC W/AUTO DIFF WBC: CPT

## 2024-11-26 PROCEDURE — 83735 ASSAY OF MAGNESIUM: CPT

## 2024-11-26 PROCEDURE — 82272 OCCULT BLD FECES 1-3 TESTS: CPT

## 2024-11-26 PROCEDURE — 83540 ASSAY OF IRON: CPT

## 2024-11-26 PROCEDURE — 82533 TOTAL CORTISOL: CPT

## 2024-11-26 PROCEDURE — 87640 STAPH A DNA AMP PROBE: CPT

## 2024-11-26 PROCEDURE — 87086 URINE CULTURE/COLONY COUNT: CPT

## 2024-11-26 PROCEDURE — 87641 MR-STAPH DNA AMP PROBE: CPT

## 2024-11-26 PROCEDURE — 81001 URINALYSIS AUTO W/SCOPE: CPT

## 2024-11-26 PROCEDURE — 96376 TX/PRO/DX INJ SAME DRUG ADON: CPT

## 2024-11-26 PROCEDURE — 96374 THER/PROPH/DIAG INJ IV PUSH: CPT

## 2024-11-26 PROCEDURE — 86880 COOMBS TEST DIRECT: CPT

## 2024-11-26 PROCEDURE — 82607 VITAMIN B-12: CPT

## 2024-11-26 PROCEDURE — 82746 ASSAY OF FOLIC ACID SERUM: CPT

## 2024-11-26 PROCEDURE — 99291 CRITICAL CARE FIRST HOUR: CPT | Mod: 25

## 2024-11-26 PROCEDURE — 80053 COMPREHEN METABOLIC PANEL: CPT

## 2024-11-26 PROCEDURE — 83880 ASSAY OF NATRIURETIC PEPTIDE: CPT

## 2024-11-26 PROCEDURE — 83550 IRON BINDING TEST: CPT

## 2024-11-26 PROCEDURE — 93005 ELECTROCARDIOGRAM TRACING: CPT

## 2024-11-26 PROCEDURE — 84484 ASSAY OF TROPONIN QUANT: CPT

## 2024-11-26 PROCEDURE — 86850 RBC ANTIBODY SCREEN: CPT

## 2024-11-26 PROCEDURE — 71275 CT ANGIOGRAPHY CHEST: CPT | Mod: MC

## 2024-11-26 PROCEDURE — 80048 BASIC METABOLIC PNL TOTAL CA: CPT

## 2024-11-26 PROCEDURE — 85730 THROMBOPLASTIN TIME PARTIAL: CPT

## 2024-12-01 ENCOUNTER — INPATIENT (INPATIENT)
Facility: HOSPITAL | Age: 64
LOS: 4 days | Discharge: LEFT BEFORE DISCHARGE | DRG: 280 | End: 2024-12-06
Attending: INTERNAL MEDICINE | Admitting: STUDENT IN AN ORGANIZED HEALTH CARE EDUCATION/TRAINING PROGRAM
Payer: MEDICARE

## 2024-12-01 VITALS
OXYGEN SATURATION: 90 % | RESPIRATION RATE: 18 BRPM | SYSTOLIC BLOOD PRESSURE: 170 MMHG | DIASTOLIC BLOOD PRESSURE: 85 MMHG | HEART RATE: 118 BPM | WEIGHT: 154.98 LBS | TEMPERATURE: 98 F

## 2024-12-01 DIAGNOSIS — Z95.2 PRESENCE OF PROSTHETIC HEART VALVE: Chronic | ICD-10-CM

## 2024-12-01 DIAGNOSIS — I77.0 ARTERIOVENOUS FISTULA, ACQUIRED: Chronic | ICD-10-CM

## 2024-12-01 DIAGNOSIS — Z90.49 ACQUIRED ABSENCE OF OTHER SPECIFIED PARTS OF DIGESTIVE TRACT: Chronic | ICD-10-CM

## 2024-12-01 DIAGNOSIS — Z94.0 KIDNEY TRANSPLANT STATUS: Chronic | ICD-10-CM

## 2024-12-01 DIAGNOSIS — Z95.818 PRESENCE OF OTHER CARDIAC IMPLANTS AND GRAFTS: Chronic | ICD-10-CM

## 2024-12-01 DIAGNOSIS — R06.00 DYSPNEA, UNSPECIFIED: ICD-10-CM

## 2024-12-01 DIAGNOSIS — Z95.1 PRESENCE OF AORTOCORONARY BYPASS GRAFT: Chronic | ICD-10-CM

## 2024-12-01 DIAGNOSIS — Z86.79 PERSONAL HISTORY OF OTHER DISEASES OF THE CIRCULATORY SYSTEM: Chronic | ICD-10-CM

## 2024-12-01 LAB
ALBUMIN SERPL ELPH-MCNC: 4 G/DL — SIGNIFICANT CHANGE UP (ref 3.3–5.2)
ALP SERPL-CCNC: 97 U/L — SIGNIFICANT CHANGE UP (ref 40–120)
ALT FLD-CCNC: 9 U/L — SIGNIFICANT CHANGE UP
ANION GAP SERPL CALC-SCNC: 19 MMOL/L — HIGH (ref 5–17)
AST SERPL-CCNC: 11 U/L — SIGNIFICANT CHANGE UP
BASOPHILS # BLD AUTO: 0.04 K/UL — SIGNIFICANT CHANGE UP (ref 0–0.2)
BASOPHILS NFR BLD AUTO: 0.8 % — SIGNIFICANT CHANGE UP (ref 0–2)
BILIRUB SERPL-MCNC: 1.5 MG/DL — SIGNIFICANT CHANGE UP (ref 0.4–2)
BUN SERPL-MCNC: 40.2 MG/DL — HIGH (ref 8–20)
CALCIUM SERPL-MCNC: 8.8 MG/DL — SIGNIFICANT CHANGE UP (ref 8.4–10.5)
CHLORIDE SERPL-SCNC: 96 MMOL/L — SIGNIFICANT CHANGE UP (ref 96–108)
CO2 SERPL-SCNC: 25 MMOL/L — SIGNIFICANT CHANGE UP (ref 22–29)
CREAT SERPL-MCNC: 5.54 MG/DL — HIGH (ref 0.5–1.3)
EGFR: 11 ML/MIN/1.73M2 — LOW
EOSINOPHIL # BLD AUTO: 0.12 K/UL — SIGNIFICANT CHANGE UP (ref 0–0.5)
EOSINOPHIL NFR BLD AUTO: 2.3 % — SIGNIFICANT CHANGE UP (ref 0–6)
FLUAV AG NPH QL: SIGNIFICANT CHANGE UP
FLUBV AG NPH QL: SIGNIFICANT CHANGE UP
GLUCOSE SERPL-MCNC: 87 MG/DL — SIGNIFICANT CHANGE UP (ref 70–99)
HCT VFR BLD CALC: 26.7 % — LOW (ref 39–50)
HGB BLD-MCNC: 8.4 G/DL — LOW (ref 13–17)
IMM GRANULOCYTES NFR BLD AUTO: 0.4 % — SIGNIFICANT CHANGE UP (ref 0–0.9)
LYMPHOCYTES # BLD AUTO: 0.6 K/UL — LOW (ref 1–3.3)
LYMPHOCYTES # BLD AUTO: 11.5 % — LOW (ref 13–44)
MAGNESIUM SERPL-MCNC: 2 MG/DL — SIGNIFICANT CHANGE UP (ref 1.8–2.6)
MCHC RBC-ENTMCNC: 31.2 PG — SIGNIFICANT CHANGE UP (ref 27–34)
MCHC RBC-ENTMCNC: 31.5 G/DL — LOW (ref 32–36)
MCV RBC AUTO: 99.3 FL — SIGNIFICANT CHANGE UP (ref 80–100)
MONOCYTES # BLD AUTO: 0.39 K/UL — SIGNIFICANT CHANGE UP (ref 0–0.9)
MONOCYTES NFR BLD AUTO: 7.4 % — SIGNIFICANT CHANGE UP (ref 2–14)
NEUTROPHILS # BLD AUTO: 4.07 K/UL — SIGNIFICANT CHANGE UP (ref 1.8–7.4)
NEUTROPHILS NFR BLD AUTO: 77.6 % — HIGH (ref 43–77)
NT-PROBNP SERPL-SCNC: HIGH PG/ML (ref 0–300)
PLATELET # BLD AUTO: 101 K/UL — LOW (ref 150–400)
POTASSIUM SERPL-MCNC: 4.1 MMOL/L — SIGNIFICANT CHANGE UP (ref 3.5–5.3)
POTASSIUM SERPL-SCNC: 4.1 MMOL/L — SIGNIFICANT CHANGE UP (ref 3.5–5.3)
PROT SERPL-MCNC: 6.7 G/DL — SIGNIFICANT CHANGE UP (ref 6.6–8.7)
RBC # BLD: 2.69 M/UL — LOW (ref 4.2–5.8)
RBC # FLD: 17.6 % — HIGH (ref 10.3–14.5)
RSV RNA NPH QL NAA+NON-PROBE: SIGNIFICANT CHANGE UP
SARS-COV-2 RNA SPEC QL NAA+PROBE: SIGNIFICANT CHANGE UP
SODIUM SERPL-SCNC: 140 MMOL/L — SIGNIFICANT CHANGE UP (ref 135–145)
TROPONIN T, HIGH SENSITIVITY RESULT: 92 NG/L — HIGH (ref 0–51)
TROPONIN T, HIGH SENSITIVITY RESULT: 96 NG/L — HIGH (ref 0–51)
WBC # BLD: 5.24 K/UL — SIGNIFICANT CHANGE UP (ref 3.8–10.5)
WBC # FLD AUTO: 5.24 K/UL — SIGNIFICANT CHANGE UP (ref 3.8–10.5)

## 2024-12-01 PROCEDURE — 99285 EMERGENCY DEPT VISIT HI MDM: CPT

## 2024-12-01 PROCEDURE — 99223 1ST HOSP IP/OBS HIGH 75: CPT

## 2024-12-01 PROCEDURE — 71045 X-RAY EXAM CHEST 1 VIEW: CPT | Mod: 26

## 2024-12-01 RX ORDER — LORAZEPAM 2 MG/1
1 TABLET ORAL ONCE
Refills: 0 | Status: DISCONTINUED | OUTPATIENT
Start: 2024-12-01 | End: 2024-12-01

## 2024-12-01 RX ORDER — HYDRALAZINE HYDROCHLORIDE 10 MG/1
100 TABLET ORAL THREE TIMES A DAY
Refills: 0 | Status: DISCONTINUED | OUTPATIENT
Start: 2024-12-01 | End: 2024-12-06

## 2024-12-01 RX ORDER — CARVEDILOL 25 MG/1
6.25 TABLET, FILM COATED ORAL EVERY 12 HOURS
Refills: 0 | Status: DISCONTINUED | OUTPATIENT
Start: 2024-12-01 | End: 2024-12-04

## 2024-12-01 RX ORDER — MAGNESIUM, ALUMINUM HYDROXIDE 200-225/5
30 SUSPENSION, ORAL (FINAL DOSE FORM) ORAL EVERY 4 HOURS
Refills: 0 | Status: DISCONTINUED | OUTPATIENT
Start: 2024-12-01 | End: 2024-12-06

## 2024-12-01 RX ORDER — CLOPIDOGREL 75 MG/1
1 TABLET, FILM COATED ORAL
Refills: 0 | DISCHARGE

## 2024-12-01 RX ORDER — ONDANSETRON HYDROCHLORIDE 4 MG/1
4 TABLET, FILM COATED ORAL EVERY 8 HOURS
Refills: 0 | Status: DISCONTINUED | OUTPATIENT
Start: 2024-12-01 | End: 2024-12-06

## 2024-12-01 RX ORDER — LEVETIRACETAM 1000 MG/1
1000 TABLET ORAL
Refills: 0 | Status: DISCONTINUED | OUTPATIENT
Start: 2024-12-01 | End: 2024-12-06

## 2024-12-01 RX ORDER — TORSEMIDE 10 MG
20 TABLET ORAL DAILY
Refills: 0 | Status: DISCONTINUED | OUTPATIENT
Start: 2024-12-01 | End: 2024-12-01

## 2024-12-01 RX ORDER — ACETAMINOPHEN, DIPHENHYDRAMINE HCL, PHENYLEPHRINE HCL 325; 25; 5 MG/1; MG/1; MG/1
3 TABLET ORAL AT BEDTIME
Refills: 0 | Status: DISCONTINUED | OUTPATIENT
Start: 2024-12-01 | End: 2024-12-06

## 2024-12-01 RX ORDER — CLONIDINE HYDROCHLORIDE 0.3 MG/1
0.1 TABLET ORAL
Refills: 0 | Status: DISCONTINUED | OUTPATIENT
Start: 2024-12-01 | End: 2024-12-06

## 2024-12-01 RX ORDER — FUROSEMIDE 40 MG/1
40 TABLET ORAL ONCE
Refills: 0 | Status: COMPLETED | OUTPATIENT
Start: 2024-12-01 | End: 2024-12-01

## 2024-12-01 RX ORDER — ISOSORBIDE MONONITRATE 10 MG
60 TABLET ORAL DAILY
Refills: 0 | Status: DISCONTINUED | OUTPATIENT
Start: 2024-12-01 | End: 2024-12-06

## 2024-12-01 RX ORDER — HEPARIN SODIUM,PORCINE 1000/ML
5000 VIAL (ML) INJECTION EVERY 12 HOURS
Refills: 0 | Status: DISCONTINUED | OUTPATIENT
Start: 2024-12-01 | End: 2024-12-01

## 2024-12-01 RX ORDER — TAMSULOSIN HYDROCHLORIDE 0.4 MG/1
0.4 CAPSULE ORAL AT BEDTIME
Refills: 0 | Status: DISCONTINUED | OUTPATIENT
Start: 2024-12-01 | End: 2024-12-06

## 2024-12-01 RX ORDER — AMLODIPINE BESYLATE 10 MG/1
5 TABLET ORAL DAILY
Refills: 0 | Status: DISCONTINUED | OUTPATIENT
Start: 2024-12-01 | End: 2024-12-04

## 2024-12-01 RX ORDER — PANTOPRAZOLE SODIUM 40 MG/1
40 TABLET, DELAYED RELEASE ORAL
Refills: 0 | Status: DISCONTINUED | OUTPATIENT
Start: 2024-12-01 | End: 2024-12-06

## 2024-12-01 RX ORDER — ONDANSETRON HYDROCHLORIDE 4 MG/1
4 TABLET, FILM COATED ORAL ONCE
Refills: 0 | Status: COMPLETED | OUTPATIENT
Start: 2024-12-01 | End: 2024-12-01

## 2024-12-01 RX ORDER — CYANOCOBALAMIN/FOLIC AC/VIT B6 1-2.2-25MG
1 TABLET ORAL DAILY
Refills: 0 | Status: DISCONTINUED | OUTPATIENT
Start: 2024-12-01 | End: 2024-12-06

## 2024-12-01 RX ORDER — SEVELAMER CARBONATE 800 MG/1
800 TABLET, FILM COATED ORAL
Refills: 0 | Status: DISCONTINUED | OUTPATIENT
Start: 2024-12-01 | End: 2024-12-06

## 2024-12-01 RX ORDER — ACETAMINOPHEN 500MG 500 MG/1
650 TABLET, COATED ORAL EVERY 6 HOURS
Refills: 0 | Status: DISCONTINUED | OUTPATIENT
Start: 2024-12-01 | End: 2024-12-06

## 2024-12-01 RX ORDER — LORAZEPAM 2 MG/1
0.5 TABLET ORAL ONCE
Refills: 0 | Status: DISCONTINUED | OUTPATIENT
Start: 2024-12-01 | End: 2024-12-01

## 2024-12-01 RX ORDER — TORSEMIDE 10 MG
20 TABLET ORAL DAILY
Refills: 0 | Status: DISCONTINUED | OUTPATIENT
Start: 2024-12-02 | End: 2024-12-06

## 2024-12-01 RX ADMIN — LORAZEPAM 1 MILLIGRAM(S): 2 TABLET ORAL at 18:16

## 2024-12-01 RX ADMIN — LORAZEPAM 0.5 MILLIGRAM(S): 2 TABLET ORAL at 23:23

## 2024-12-01 RX ADMIN — ONDANSETRON HYDROCHLORIDE 4 MILLIGRAM(S): 4 TABLET, FILM COATED ORAL at 18:17

## 2024-12-01 RX ADMIN — FUROSEMIDE 40 MILLIGRAM(S): 40 TABLET ORAL at 23:58

## 2024-12-01 NOTE — PATIENT PROFILE ADULT - SAFE PLACE TO LIVE
CHARITY MACEDO    Patient Age: 12 year old   Refill request by: Phone.  Caller informed to check with the pharmacy later for their refill.  If problems arise, we will contact patient.  Refill to be: ePrescribed to [facility] in [place]  pharmacy    Medication requested to be refilled:   dexmethylphenidate (FOCALIN) 10 MG tablet    Patient notified refills can take 72 hours to process: yes    Patient's next appointment is scheduled for 01.04.2020    Patient's last appointment with this Provider was 10.21.2019    Patient has 1 remaining       WEIGHT AND HEIGHT:   Wt Readings from Last 1 Encounters:   10/21/19 45.4 kg (100 lb) (83 %, Z= 0.95)*     * Growth percentiles are based on CDC (Girls, 2-20 Years) data.     Ht Readings from Last 1 Encounters:   10/21/19 4' 10\" (1.473 m) (70 %, Z= 0.53)*     * Growth percentiles are based on CDC (Girls, 2-20 Years) data.     BMI Readings from Last 1 Encounters:   10/21/19 20.90 kg/m² (86 %, Z= 1.07)*     * Growth percentiles are based on CDC (Girls, 2-20 Years) data.       ALLERGIES: no known allergies.  Current Outpatient Medications   Medication   • Dexmethylphenidate HCl (FOCALIN XR) 40 MG CAPSULE SR 24 HR   • dexmethylphenidate (FOCALIN) 10 MG tablet   • guanfacine (TENEX) 2 MG tablet   • risperiDONE (RISPERDAL) 0.5 MG tablet     No current facility-administered medications for this visit.        ROUTING: Patient's physician/staff        PCP: No Pcp         INS: Payor: ROYA/MELANIA / Plan: [id number] / Product Type: PPO MISC   PATIENT ADDRESS:  [id number] [id number]       no

## 2024-12-01 NOTE — H&P ADULT - NSHPPHYSICALEXAM_GEN_ALL_CORE
GENERAL: pt examined bedside, laying comfortably in bed in NAD  HEENT: NC/AT, moist oral mucosa, clear conjunctiva, sclera nonicteric  RESPIRATORY: Normal respiratory effort; CTA b/l, no wheezing, rhonchi, rales  CARDIOVASCULAR: RRR, normal S1 and S2, no murmur/rub/gallop  ABDOMEN: soft, NT/ND, normoactive bowel sounds, no rebound/guarding  MSK: No joint deformities, edema, erythema  EXTREMITIES: No cynaosis, no clubbing, no lower extremity edema; Peripheral pulses are 2+ bilaterally  PSYCH: affect appropriate and cooperative  NEUROLOGY: A+O to person, place, and time, no focal neurologic deficits appreciated  SKIN: No rashes or no palpable lesions GENERAL: pt examined bedside, laying comfortably in bed in NAD  HEENT: NC/AT, moist oral mucosa, clear conjunctiva, sclera nonicteric  RESPIRATORY: Normal respiratory effort, no wheezing, rhonchi, rales  CARDIOVASCULAR: irregularly irregular rate and rhythm, normal S1 and S2  ABDOMEN: soft, NT/ND, +bowel sounds, no rebound/guarding  MSK: No joint deformities, edema, erythema  EXTREMITIES: No cynaosis, no clubbing, no lower extremity edema  PSYCH: affect appropriate and cooperative  NEUROLOGY: A+O to person, place, and time, no focal neurologic deficits appreciated  SKIN: No rashes or no palpable lesions

## 2024-12-01 NOTE — H&P ADULT - ASSESSMENT
63 y/o M w/ PMH Afib (s/p watchman, no longer on AC), ESRD (HD m/w/f; follows w/ Dr. Aparicio's group), chronic anemia, CAD (s/p CABG), aortic & mitral valve replacement, aortic dissection s/p repair, ischemic bowel s/p resection, seizure disorder (on keppra) presented c/o SOB/ZUNIGA w/ associated productive gray sputum and had 2 episodes of NBNB emesis that occurred after coughing yesterday.  Pt in AF RVR w/ rates in 120's.   Initial w/u significant for trop 96-->92, BNP >33K, EKG w/ no acute ischemic changes, Flu/RSV/Covid negative.  CXR w/ increased pulm vascular congestion.  Will admit for AF RVR.       SOB/ZUNIGA likely 2/2 vol overload 2/2 ESRD  ESRD (HD m/w/f; follows w/ Dr. Acevedos group)  AGMA likely 2/2 uremia   - Clinically vol euvolemic on exam but BNP>33K and CXR w/ increased pulm vascular congestion  - MUSTAPHA from 09/19/2024 w/ LVEF 55%, mod TR, mod pHTN, bioprosthetic AV and MV noted and watchman devise noted, ascending aortic repair noted  - Anemia could also be contributing to sob however is at baseline therefore less likely   - Anticipate AG will improve s/p HD  - Flu/RSV/Covid negative   - Pt reports he still makes urine therefore will give 1x dose of IV lasix tonight pending HD in AM   - Nephrology consulted and pt will go HD tomorrow morning as declining HD tonight      SIRS w/ tachypnea and tachycardia   - Clinically nontoxic appearing   - Will order UA and blood cultures   - Will check lactate  - Flu/RSV/Covid negative   - CXR w/ pulm vascular congestion but no consolidation or infiltrate  - Will monitor off abx   - No leukocytosis or bandemia   - Monitor CBC and temperature       Afib w/ RVR  - Rate improving and now in 80's-90's  - s/p watchman, no longer on AC  - Pt is on BB for rate control and will continue   - Anticipate HR will improve once vol is removed w/ HD      Type II MI, likely poor renal clearance in setting of ESRD vs demand 2/2 above  - Repeat trop down trending (96-->92)  - BNP >33K likely from vol overload from ESRD  - EKG w/ no acute ischemic changes but QTc 516  - Hold QTc prolonging medications if QTc remains >500  - Repeat EKG in AM to reassess QTc   - Check lipid panel and A1c in AM  - Given recent TTE in 09/2024 will hold off on repeating TTE at this time  - Monitor on telemetry       Chronic anemia  - At baseline Hb (ranges 7.5-8.5)  - s/p recent hospitalization for GIB 11/04-11/12  - Was cleared to resume AC and plavix was d/c'd  - Colonoscopy 11/2024 showed internal hemorrhoids & some neovascularization at the site of colonic anastomosis   - Hemodynamically stable at this time and no active bleeding   - Given RDW will check iron panel, b12/folate  - Monitor CBC and transfuse for Hb<8      CAD (s/p CABG)  HTN / HLD  - c/w carvedilol, amlodipine, torsemide, isosorbide, hydralazine, clonidine   - c/w statin therapy   - c/w ASA      Seizure disorder  - c/w keppra         VTE ppx: SCDs    Dispo: Acute.  Anticipate d/gerardo 2-4 days pending clinical course.

## 2024-12-01 NOTE — PATIENT PROFILE ADULT - FALL HARM RISK - HARM RISK INTERVENTIONS

## 2024-12-01 NOTE — CONSULT NOTE ADULT - ASSESSMENT
ESRD - Some PVC on CXR  Had full HD yesterday in [facility]  REFUSING additional HD shivani   Sayus he justs wants to go home , feels much better   Offered him HD first thing in the am , to see if helps with SOB , but refusing that too     Anemia - Recent colonoscopy few weeks ago   Hgb better   Will provide Retacrit with HD     HTN - Resume meds     Will follow , see if changes mari re. HD in am

## 2024-12-01 NOTE — ED PROVIDER NOTE - OBJECTIVE STATEMENT
64/M PMHx A-fib, ESRD, CAD (s/p CABG), aortic & mitral valve replacement, aortic dissection, bowel ischemia with bowel resection Presenting with shortness of breath.  Patient states he had his full dialysis session yesterday  and noticed when he was walking to his car afterwards that he was getting short of breath.  He states symptoms have slightly progressed since yesterday.  Unable to ambulate without becoming short of breath and feeling nauseous.  Has had 1 or 2 episodes of vomiting today.  Denies any chest pain.  No lightheadedness or syncope.  No lower extremity swelling or pain.  Patient states he has cough and congestion but denies any known fever.

## 2024-12-01 NOTE — H&P ADULT - NSHPLABSRESULTS_GEN_ALL_CORE
8.4    5.24  )-----------( 101      ( 01 Dec 2024 15:56 )             26.7         12-01    140  |  96  |  40.2[H]  ----------------------------<  87  4.1   |  25.0  |  5.54[H]    Ca    8.8      01 Dec 2024 15:56  Mg     2.0     12-01    TPro  6.7  /  Alb  4.0  /  TBili  1.5  /  DBili  x   /  AST  11  /  ALT  9   /  AlkPhos  97  12-01      Troponin T, High Sensitivity (12.01.24 @ 15:56)    Troponin T, High Sensitivity Result: 96: *      Troponin T, High Sensitivity (12.01.24 @ 18:27)    Troponin T, High Sensitivity Result: 92: *      Pro-Brain Natriuretic Peptide (12.01.24 @ 15:56)    Pro-Brain Natriuretic Peptide: [id number] pg/mL      < from: Xray Chest 1 View- PORTABLE-Urgent (12.01.24 @ 15:50) >    IMPRESSION: Mild cardiomegaly.  Diffuse pulmonary vascular congestion without large airspace   consolidations or pleural effusions    < end of copied text >      < from: MUSTAPHA W or WO Ultrasound Enhancing Agent (09.19.24 @ 10:05) >    CONCLUSIONS:      1. (+) interatrial shunt. No cardiac mass, vegetations, or intra cardiac thrombus visualized.   2. Left atrium is mildly dilated.   3. Atrial septal defect consistent with a prior atrial septectomy.   4. A Watchman closure device is present in the left atrial appendage. The left atrial appendage closure device appears to be positioned normally. No residual flow observed. There is no device related thrombus seen.   5. Left ventricular systolic function is normal with an ejection fraction visually estimated at 55 to 60 %.   6. The right atrium is mildly dilated.   7. Mildly enlarged right ventricular cavity size and normal right ventricular systolic function.   8. Bioprosthetic valve present. in the mitral position. Well seated prosthetic mitral valve with normal function with normal leaflet excursion.   9. Bioprosthetic aortic valve replacement. Well seated valve. Gradients not obtained. Normal leaflet excursion.. No regurgitation.  10. Moderate tricuspid regurgitation.  11. Estimated pulmonary artery systolic pressure is 51 mmHg, consistent with moderate pulmonary hypertension.  12. No pericardial effusion seen.  13. Conclusion: s/p Watchman procedure (27 mm): Well-seated device. Adequate compression. No leak.  14. Ascending aorta repair. There is a type B dissection with small true lumen. Thrombosed false lumen. Dissection starts from the arch of aorta all the      The native root measures 4.8 cm.    < end of copied text >

## 2024-12-01 NOTE — PATIENT PROFILE ADULT - FUNCTIONAL ASSESSMENT - DAILY ACTIVITY 2.
[FreeTextEntry1] : Complete skin examination is negative for malignancy; Multiple new concerns were addressed and discussed.\par Therapeutic options and their risks and benefits; along with multiple diagnostic possibilities were discussed at length;\par risks and benefits of skin biopsy and/or other further study were discussed;\par \par \par Follow up for TBSE in 1 year 
4 = No assist / stand by assistance

## 2024-12-01 NOTE — ED PROVIDER NOTE - CROS ED RESP ALL NEG
"EDGARDO met with patient and spouse to provide discharge follow-up instructions. SW reviewed with patient contents of "[facility]" including "help at home", "things patient responsible for to manage her health at home" and "preferences". Patient was able to verbalize her help at home will be her spouse and adult children. Patient verbalized how she will manage her health at home is by going on her doctor appointments, getting prescriptions filled and taking her medications. SW reiterated to patient she took his preferences into consideration by scheduling appointments in the morning. EDGARDO informed nurse Viridiana cardenas completed all discharge planning for patient and she could complete her nursing education discharge with patient.         07/06/18 1519   Final Note   Assessment Type Final Discharge Note   Discharge Disposition Home-Health  (Vital Link  with [facility])   Hospital Follow Up  Appt(s) scheduled? Yes   Right Care Referral Info   Post Acute Recommendation Home-care     " - - -

## 2024-12-01 NOTE — ED ADULT NURSE NOTE - NSFALLUNIVINTERV_ED_ALL_ED
Bed/Stretcher in lowest position, wheels locked, appropriate side rails in place/Call bell, personal items and telephone in reach/Instruct patient to call for assistance before getting out of bed/chair/stretcher/Non-slip footwear applied when patient is off stretcher/[place] to call system/Physically safe environment - no spills, clutter or unnecessary equipment/Purposeful proactive rounding/Room/bathroom lighting operational, light cord in reach

## 2024-12-01 NOTE — ED PROVIDER NOTE - ENMT, MLM
Airway patent, Nasal mucosa   Congested. Mouth with normal mucosa. Throat has no vesicles, no oropharyngeal exudates and uvula is midline.

## 2024-12-01 NOTE — H&P ADULT - HISTORY OF PRESENT ILLNESS
63 y/o M w/ PMH Afib (s/p watchman, no longer on AC), ESRD (HD m/w/f; follows w/ Dr. Aparicio's group), HFpEF (55%) chronic anemia, CAD (s/p CABG), aortic & mitral valve replacement, aortic dissection s/p repair, ischemic bowel s/p resection, seizure disorder (on keppra) presented c/o SOB that started after HD yesterday.  Pt states sob is exertional.  He also reports productive cough that is thick and gray for the past few days.  He also had 2 episodes of NBNB emesis that occurred after coughing.  He denies LE edema and still makes urine.  He denies fevers, chills, sick contacts that he knows of and no recent travel.  He denies CP, palpitations orthopnea, abd pain, diarrhea. He was recently hospitalized 11/4-11/12 for HTN emergency after missed HD and hospital course was complicated by lower GIB.  Pt required 1u PRBC, ASA held, plavix d/c'd and GI was consulted.  He had a colonoscopy which showed internal hemorrhoids & some neovascularization at the site of colonic anastomosis and was cleared to resume ASA.       65 y/o M w/ PMH Afib (s/p watchman, no longer on AC), ESRD (HD m/w/f; follows w/ Dr. Aparicio's group), chronic anemia, CAD (s/p CABG), aortic & mitral valve replacement, aortic dissection s/p repair, ischemic bowel s/p resection, seizure disorder (on keppra) presented c/o SOB that started after HD yesterday.  Pt states sob is exertional.  He also reports productive cough that is thick and gray for the past few days.  He also had 2 episodes of NBNB emesis that occurred after coughing.  He denies LE edema and still makes urine.  He denies fevers, chills, sick contacts that he knows of and no recent travel.  He denies CP, palpitations orthopnea, abd pain, diarrhea. He was recently hospitalized 11/4-11/12 for HTN emergency after missed HD and hospital course was complicated by lower GIB.  Pt required 1u PRBC, ASA held, plavix d/c'd and GI was consulted.  He had a colonoscopy which showed internal hemorrhoids & some neovascularization at the site of colonic anastomosis and was cleared to resume ASA.

## 2024-12-01 NOTE — PATIENT PROFILE ADULT - FUNCTIONAL ASSESSMENT - BASIC MOBILITY 6.
4-calculated by average/Not able to assess (calculate score using [facility] averaging method)

## 2024-12-01 NOTE — ED ADULT NURSE NOTE - OBJECTIVE STATEMENT
Pt is a 64YOM who is here for shortness of breath, difficulty breathing, dyspnea on exertion, pt states he was at dialysis yesterday and when he was walking to his car he became extremely short of breath, pt states he has gotten worse over the last few days, pt states that he has a hx of an aortic valve replacement. Pt denies any fevers, chills, nausea, vomiting, pt states that this is new and has not happened before, states he has been on dialysis for 3 years.

## 2024-12-01 NOTE — ED PROVIDER NOTE - PROGRESS NOTE DETAILS
X-ray with pulmonary vascular congestion. Spoke with renal who will arrange for dialysis.  Patient admitted to medicine.  Stable on 2 L nasal cannula,   On room air patient desats to the mid to upper 80s.  Patient states he is not currently on home O2

## 2024-12-01 NOTE — ED PROVIDER NOTE - CLINICAL SUMMARY MEDICAL DECISION MAKING FREE TEXT BOX
patient presenting with cough, congestion, shortness of breath, nausea.  Noted to have decreased breath on the left on examination.  Will obtain ACS workup, chest x-ray, rectal temperature, flu/COVID swab and reevaluate.

## 2024-12-01 NOTE — ED ADULT NURSE NOTE - NS ED NOTE ABUSE RESPONSE YN
Problem: Airway Clearance - Ineffective  Goal: Achieve or maintain patent airway  Outcome: Ongoing     Problem: Gas Exchange - Impaired  Goal: Absence of hypoxia  Outcome: Ongoing  Goal: Promote optimal lung function  Outcome: Ongoing     Problem: Breathing Pattern - Ineffective  Goal: Ability to achieve and maintain a regular respiratory rate  Outcome: Ongoing     Problem:  Body Temperature -  Risk of, Imbalanced  Goal: Ability to maintain a body temperature within defined limits  Outcome: Ongoing  Goal: Will regain or maintain usual level of consciousness  Outcome: Ongoing  Goal: Complications related to the disease process, condition or treatment will be avoided or minimized  Outcome: Ongoing     Problem: Isolation Precautions - Risk of Spread of Infection  Goal: Prevent transmission of infection  Outcome: Ongoing     Problem: Nutrition Deficits  Goal: Optimize nutritional status  Outcome: Ongoing     Problem: Risk for Fluid Volume Deficit  Goal: Maintain normal heart rhythm  Outcome: Ongoing  Goal: Maintain absence of muscle cramping  Outcome: Ongoing  Goal: Maintain normal serum potassium, sodium, calcium, phosphorus, and pH  Outcome: Ongoing     Problem: Loneliness or Risk for Loneliness  Goal: Demonstrate positive use of time alone when socialization is not possible  Outcome: Ongoing     Problem: Fatigue  Goal: Verbalize increase energy and improved vitality  Outcome: Ongoing     Problem: Patient Education: Go to Patient Education Activity  Goal: Patient/Family Education  Outcome: Ongoing     Problem: Skin Integrity:  Goal: Will show no infection signs and symptoms  Description: Will show no infection signs and symptoms  Outcome: Ongoing  Goal: Absence of new skin breakdown  Description: Absence of new skin breakdown  Outcome: Ongoing     Problem: Nutrition  Goal: Optimal nutrition therapy  Outcome: Ongoing     Problem: Pain:  Goal: Pain level will decrease  Description: Pain level will decrease  Outcome: Ongoing  Goal: Control of acute pain  Description: Control of acute pain  Outcome: Ongoing  Goal: Control of chronic pain  Description: Control of chronic pain  Outcome: Ongoing Yes

## 2024-12-02 LAB
A1C WITH ESTIMATED AVERAGE GLUCOSE RESULT: 4.2 % — SIGNIFICANT CHANGE UP (ref 4–5.6)
ALBUMIN SERPL ELPH-MCNC: 3.5 G/DL — SIGNIFICANT CHANGE UP (ref 3.3–5.2)
ALP SERPL-CCNC: 81 U/L — SIGNIFICANT CHANGE UP (ref 40–120)
ALT FLD-CCNC: 7 U/L — SIGNIFICANT CHANGE UP
ANION GAP SERPL CALC-SCNC: 14 MMOL/L — SIGNIFICANT CHANGE UP (ref 5–17)
APPEARANCE UR: CLEAR — SIGNIFICANT CHANGE UP
AST SERPL-CCNC: 6 U/L — SIGNIFICANT CHANGE UP
BACTERIA # UR AUTO: NEGATIVE /HPF — SIGNIFICANT CHANGE UP
BASOPHILS # BLD AUTO: 0.03 K/UL — SIGNIFICANT CHANGE UP (ref 0–0.2)
BASOPHILS NFR BLD AUTO: 0.7 % — SIGNIFICANT CHANGE UP (ref 0–2)
BILIRUB SERPL-MCNC: 1.3 MG/DL — SIGNIFICANT CHANGE UP (ref 0.4–2)
BILIRUB UR-MCNC: NEGATIVE — SIGNIFICANT CHANGE UP
BLD GP AB SCN SERPL QL: SIGNIFICANT CHANGE UP
BUN SERPL-MCNC: 45.8 MG/DL — HIGH (ref 8–20)
CALCIUM SERPL-MCNC: 8.3 MG/DL — LOW (ref 8.4–10.5)
CAST: 0 /LPF — SIGNIFICANT CHANGE UP (ref 0–4)
CHLORIDE SERPL-SCNC: 99 MMOL/L — SIGNIFICANT CHANGE UP (ref 96–108)
CHOLEST SERPL-MCNC: 92 MG/DL — SIGNIFICANT CHANGE UP
CO2 SERPL-SCNC: 26 MMOL/L — SIGNIFICANT CHANGE UP (ref 22–29)
COLOR SPEC: YELLOW — SIGNIFICANT CHANGE UP
CREAT SERPL-MCNC: 6.17 MG/DL — HIGH (ref 0.5–1.3)
DIFF PNL FLD: NEGATIVE — SIGNIFICANT CHANGE UP
DIR ANTIGLOB POLYSPECIFIC INTERPRETATION: SIGNIFICANT CHANGE UP
EGFR: 10 ML/MIN/1.73M2 — LOW
EOSINOPHIL # BLD AUTO: 0.17 K/UL — SIGNIFICANT CHANGE UP (ref 0–0.5)
EOSINOPHIL NFR BLD AUTO: 3.9 % — SIGNIFICANT CHANGE UP (ref 0–6)
ESTIMATED AVERAGE GLUCOSE: 74 MG/DL — SIGNIFICANT CHANGE UP (ref 68–114)
FERRITIN SERPL-MCNC: 451 NG/ML — HIGH (ref 30–400)
FOLATE SERPL-MCNC: 12.5 NG/ML — SIGNIFICANT CHANGE UP
GLUCOSE SERPL-MCNC: 120 MG/DL — HIGH (ref 70–99)
GLUCOSE UR QL: NEGATIVE MG/DL — SIGNIFICANT CHANGE UP
HCT VFR BLD CALC: 24.8 % — LOW (ref 39–50)
HDLC SERPL-MCNC: 45 MG/DL — SIGNIFICANT CHANGE UP
HGB BLD-MCNC: 7.7 G/DL — LOW (ref 13–17)
IMM GRANULOCYTES NFR BLD AUTO: 0.2 % — SIGNIFICANT CHANGE UP (ref 0–0.9)
IRON SATN MFR SERPL: 11 % — LOW (ref 16–55)
IRON SATN MFR SERPL: 31 UG/DL — LOW (ref 59–158)
KETONES UR-MCNC: NEGATIVE MG/DL — SIGNIFICANT CHANGE UP
LACTATE SERPL-SCNC: 1.1 MMOL/L — SIGNIFICANT CHANGE UP (ref 0.5–2)
LEUKOCYTE ESTERASE UR-ACNC: NEGATIVE — SIGNIFICANT CHANGE UP
LIPID PNL WITH DIRECT LDL SERPL: 37 MG/DL — SIGNIFICANT CHANGE UP
LYMPHOCYTES # BLD AUTO: 0.61 K/UL — LOW (ref 1–3.3)
LYMPHOCYTES # BLD AUTO: 14.1 % — SIGNIFICANT CHANGE UP (ref 13–44)
MAGNESIUM SERPL-MCNC: 2 MG/DL — SIGNIFICANT CHANGE UP (ref 1.6–2.6)
MCHC RBC-ENTMCNC: 31 G/DL — LOW (ref 32–36)
MCHC RBC-ENTMCNC: 31.3 PG — SIGNIFICANT CHANGE UP (ref 27–34)
MCV RBC AUTO: 100.8 FL — HIGH (ref 80–100)
MONOCYTES # BLD AUTO: 0.43 K/UL — SIGNIFICANT CHANGE UP (ref 0–0.9)
MONOCYTES NFR BLD AUTO: 10 % — SIGNIFICANT CHANGE UP (ref 2–14)
MRSA PCR RESULT.: SIGNIFICANT CHANGE UP
NEUTROPHILS # BLD AUTO: 3.07 K/UL — SIGNIFICANT CHANGE UP (ref 1.8–7.4)
NEUTROPHILS NFR BLD AUTO: 71.1 % — SIGNIFICANT CHANGE UP (ref 43–77)
NITRITE UR-MCNC: NEGATIVE — SIGNIFICANT CHANGE UP
NON HDL CHOLESTEROL: 47 MG/DL — SIGNIFICANT CHANGE UP
PH UR: 8 — SIGNIFICANT CHANGE UP (ref 5–8)
PHOSPHATE SERPL-MCNC: 6.3 MG/DL — HIGH (ref 2.4–4.7)
PLATELET # BLD AUTO: 82 K/UL — LOW (ref 150–400)
POTASSIUM SERPL-MCNC: 3.6 MMOL/L — SIGNIFICANT CHANGE UP (ref 3.5–5.3)
POTASSIUM SERPL-SCNC: 3.6 MMOL/L — SIGNIFICANT CHANGE UP (ref 3.5–5.3)
PROT SERPL-MCNC: 6 G/DL — LOW (ref 6.6–8.7)
PROT UR-MCNC: 100 MG/DL
RBC # BLD: 2.46 M/UL — LOW (ref 4.2–5.8)
RBC # FLD: 17.3 % — HIGH (ref 10.3–14.5)
RBC CASTS # UR COMP ASSIST: 2 /HPF — SIGNIFICANT CHANGE UP (ref 0–4)
S AUREUS DNA NOSE QL NAA+PROBE: SIGNIFICANT CHANGE UP
SODIUM SERPL-SCNC: 139 MMOL/L — SIGNIFICANT CHANGE UP (ref 135–145)
SP GR SPEC: 1.01 — SIGNIFICANT CHANGE UP (ref 1–1.03)
SQUAMOUS # UR AUTO: 0 /HPF — SIGNIFICANT CHANGE UP (ref 0–5)
TIBC SERPL-MCNC: 292 UG/DL — SIGNIFICANT CHANGE UP (ref 220–430)
TRANSFERRIN SERPL-MCNC: 204 MG/DL — SIGNIFICANT CHANGE UP (ref 180–329)
TRIGL SERPL-MCNC: 52 MG/DL — SIGNIFICANT CHANGE UP
TSH SERPL-MCNC: 2.27 UIU/ML — SIGNIFICANT CHANGE UP (ref 0.27–4.2)
UROBILINOGEN FLD QL: 0.2 MG/DL — SIGNIFICANT CHANGE UP (ref 0.2–1)
VIT B12 SERPL-MCNC: 819 PG/ML — SIGNIFICANT CHANGE UP (ref 232–1245)
WBC # BLD: 4.32 K/UL — SIGNIFICANT CHANGE UP (ref 3.8–10.5)
WBC # FLD AUTO: 4.32 K/UL — SIGNIFICANT CHANGE UP (ref 3.8–10.5)
WBC UR QL: 0 /HPF — SIGNIFICANT CHANGE UP (ref 0–5)

## 2024-12-02 PROCEDURE — 99233 SBSQ HOSP IP/OBS HIGH 50: CPT

## 2024-12-02 PROCEDURE — 86077 PHYS BLOOD BANK SERV XMATCH: CPT

## 2024-12-02 PROCEDURE — 93010 ELECTROCARDIOGRAM REPORT: CPT

## 2024-12-02 RX ORDER — ERYTHROPOIETIN 3000 [IU]/ML
10000 INJECTION, SOLUTION INTRAVENOUS; SUBCUTANEOUS
Refills: 0 | Status: DISCONTINUED | OUTPATIENT
Start: 2024-12-03 | End: 2024-12-06

## 2024-12-02 RX ORDER — CYCLOBENZAPRINE HCL 10 MG
10 TABLET ORAL ONCE
Refills: 0 | Status: COMPLETED | OUTPATIENT
Start: 2024-12-02 | End: 2024-12-02

## 2024-12-02 RX ORDER — METHOCARBAMOL 500 MG/1
500 TABLET, FILM COATED ORAL THREE TIMES A DAY
Refills: 0 | Status: DISCONTINUED | OUTPATIENT
Start: 2024-12-02 | End: 2024-12-06

## 2024-12-02 RX ORDER — CHLORHEXIDINE GLUCONATE 1.2 MG/ML
1 RINSE ORAL
Refills: 0 | Status: DISCONTINUED | OUTPATIENT
Start: 2024-12-02 | End: 2024-12-06

## 2024-12-02 RX ORDER — CYCLOBENZAPRINE HCL 10 MG
5 TABLET ORAL ONCE
Refills: 0 | Status: COMPLETED | OUTPATIENT
Start: 2024-12-02 | End: 2024-12-02

## 2024-12-02 RX ADMIN — TAMSULOSIN HYDROCHLORIDE 0.4 MILLIGRAM(S): 0.4 CAPSULE ORAL at 22:31

## 2024-12-02 RX ADMIN — Medication 20 MILLIGRAM(S): at 05:10

## 2024-12-02 RX ADMIN — CHLORHEXIDINE GLUCONATE 1 APPLICATION(S): 1.2 RINSE ORAL at 12:34

## 2024-12-02 RX ADMIN — Medication 81 MILLIGRAM(S): at 12:34

## 2024-12-02 RX ADMIN — SEVELAMER CARBONATE 800 MILLIGRAM(S): 800 TABLET, FILM COATED ORAL at 09:11

## 2024-12-02 RX ADMIN — AMLODIPINE BESYLATE 5 MILLIGRAM(S): 10 TABLET ORAL at 05:08

## 2024-12-02 RX ADMIN — HYDRALAZINE HYDROCHLORIDE 100 MILLIGRAM(S): 10 TABLET ORAL at 13:40

## 2024-12-02 RX ADMIN — Medication 5 MILLIGRAM(S): at 16:47

## 2024-12-02 RX ADMIN — HYDRALAZINE HYDROCHLORIDE 100 MILLIGRAM(S): 10 TABLET ORAL at 22:30

## 2024-12-02 RX ADMIN — LEVETIRACETAM 1000 MILLIGRAM(S): 1000 TABLET ORAL at 05:08

## 2024-12-02 RX ADMIN — CLONIDINE HYDROCHLORIDE 0.1 MILLIGRAM(S): 0.3 TABLET ORAL at 17:51

## 2024-12-02 RX ADMIN — ACETAMINOPHEN, DIPHENHYDRAMINE HCL, PHENYLEPHRINE HCL 3 MILLIGRAM(S): 325; 25; 5 TABLET ORAL at 01:53

## 2024-12-02 RX ADMIN — METHOCARBAMOL 500 MILLIGRAM(S): 500 TABLET, FILM COATED ORAL at 18:38

## 2024-12-02 RX ADMIN — ACETAMINOPHEN 500MG 650 MILLIGRAM(S): 500 TABLET, COATED ORAL at 01:53

## 2024-12-02 RX ADMIN — Medication 60 MILLIGRAM(S): at 12:33

## 2024-12-02 RX ADMIN — CLONIDINE HYDROCHLORIDE 0.1 MILLIGRAM(S): 0.3 TABLET ORAL at 05:09

## 2024-12-02 RX ADMIN — PANTOPRAZOLE SODIUM 40 MILLIGRAM(S): 40 TABLET, DELAYED RELEASE ORAL at 05:09

## 2024-12-02 RX ADMIN — SEVELAMER CARBONATE 800 MILLIGRAM(S): 800 TABLET, FILM COATED ORAL at 12:34

## 2024-12-02 RX ADMIN — CARVEDILOL 6.25 MILLIGRAM(S): 25 TABLET, FILM COATED ORAL at 05:09

## 2024-12-02 RX ADMIN — Medication 10 MILLIGRAM(S): at 22:31

## 2024-12-02 RX ADMIN — LEVETIRACETAM 1000 MILLIGRAM(S): 1000 TABLET ORAL at 17:51

## 2024-12-02 RX ADMIN — CARVEDILOL 6.25 MILLIGRAM(S): 25 TABLET, FILM COATED ORAL at 17:50

## 2024-12-02 RX ADMIN — Medication 1 TABLET(S): at 12:33

## 2024-12-02 RX ADMIN — HYDRALAZINE HYDROCHLORIDE 100 MILLIGRAM(S): 10 TABLET ORAL at 05:08

## 2024-12-02 RX ADMIN — Medication 40 MILLIGRAM(S): at 22:31

## 2024-12-02 NOTE — PROGRESS NOTE ADULT - ASSESSMENT
ESRD - Some PVC on CXR  Had full HD Sat in [facility]  REFUSING additional HD today  Sayus he justs wants to go home , feels much better   HD set up for tomorrow     Anemia - Recent colonoscopy few weeks ago   Hgb noted , check iron stores   Will provide Retacrit with HD     HTN - Resumed meds     RO - Renvela resumed     Will follow   ESRD - Some PVC on CXR  Had full HD Sat in [facility]  REFUSING additional HD today  Says he justs wants to go home , feels much better   HD set up for tomorrow     Anemia - Recent colonoscopy few weeks ago   Hgb noted , check iron stores   Will provide Retacrit with HD     HTN - Resumed meds     RO - Renvela resumed     Will follow

## 2024-12-02 NOTE — PROGRESS NOTE ADULT - SUBJECTIVE AND OBJECTIVE BOX
NEPHROLOGY INTERVAL HPI/OVERNIGHT EVENTS:    Feels better   Rate under better control   SOB better   Afebrile     MEDICATIONS  (STANDING):  amLODIPine   Tablet 5 milliGRAM(s) Oral daily  aspirin enteric coated 81 milliGRAM(s) Oral daily  atorvastatin 40 milliGRAM(s) Oral at bedtime  carvedilol 6.25 milliGRAM(s) Oral every 12 hours  chlorhexidine 2% Cloths 1 Application(s) Topical <User Schedule>  cloNIDine 0.1 milliGRAM(s) Oral two times a day  hydrALAZINE 100 milliGRAM(s) Oral three times a day  isosorbide   mononitrate ER Tablet (IMDUR) 60 milliGRAM(s) Oral daily  levETIRAcetam 1000 milliGRAM(s) Oral two times a day  multivitamin 1 Tablet(s) Oral daily  pantoprazole    Tablet 40 milliGRAM(s) Oral before breakfast  sevelamer carbonate 800 milliGRAM(s) Oral three times a day with meals  tamsulosin 0.4 milliGRAM(s) Oral at bedtime  torsemide 20 milliGRAM(s) Oral daily    MEDICATIONS  (PRN):  acetaminophen     Tablet .. 650 milliGRAM(s) Oral every 6 hours PRN Temp greater or equal to 38C (100.4F), Mild Pain (1 - 3)  aluminum hydroxide/magnesium hydroxide/simethicone Suspension 30 milliLiter(s) Oral every 4 hours PRN Dyspepsia  melatonin 3 milliGRAM(s) Oral at bedtime PRN Insomnia  ondansetron Injectable 4 milliGRAM(s) IV Push every 8 hours PRN Nausea and/or Vomiting      Allergies    penicillin (Other)    Intolerances          Vital Signs Last 24 Hrs  T(C): 36.5 (02 Dec 2024 11:13), Max: 37.4 (01 Dec 2024 16:52)  T(F): 97.7 (02 Dec 2024 11:13), Max: 99.4 (01 Dec 2024 16:52)  HR: 84 (02 Dec 2024 11:13) (84 - 116)  BP: 107/71 (02 Dec 2024 11:13) (107/71 - 174/94)  BP(mean): 120 (01 Dec 2024 22:31) (120 - 120)  RR: 18 (02 Dec 2024 11:13) (18 - 22)  SpO2: 98% (02 Dec 2024 11:13) (93% - 98%)    Parameters below as of 02 Dec 2024 11:13  Patient On (Oxygen Delivery Method): nasal cannula  O2 Flow (L/min): 3    Daily     Daily   I&O's Detail    01 Dec 2024 07:01  -  02 Dec 2024 07:00  --------------------------------------------------------  IN:    Oral Fluid: 458 mL  Total IN: 458 mL    OUT:    Voided (mL): 350 mL  Total OUT: 350 mL    Total NET: 108 mL        I&O's Summary    01 Dec 2024 07:01  -  02 Dec 2024 07:00  --------------------------------------------------------  IN: 458 mL / OUT: 350 mL / NET: 108 mL        PHYSICAL EXAM:    GENERAL: NAD,  HEAD:  Atraumatic, Normocephalic , no edema   NECK: Supple, No JVD,   NERVOUS SYSTEM:  Alert & Oriented X3,  CHEST/LUNG: EAE , No wheeze   HEART: Regular rate and rhythm; No gallop or rub   ABDOMEN: Soft, Nontender, Nondistended; Bowel sounds present  EXTREMITIES:  no sig edema . L access w/ thrill     LABS:                        7.7    4.32  )-----------( 82       ( 02 Dec 2024 04:38 )             24.8     12-02    139  |  99  |  45.8[H]  ----------------------------<  120[H]  3.6   |  26.0  |  6.17[H]    Ca    8.3[L]      02 Dec 2024 04:38  Phos  6.3     12-02  Mg     2.0     12-02    TPro  6.0[L]  /  Alb  3.5  /  TBili  1.3  /  DBili  x   /  AST  6   /  ALT  7   /  AlkPhos  81  12-02      Urinalysis Basic - ( 02 Dec 2024 04:38 )    Color: x / Appearance: x / SG: x / pH: x  Gluc: 120 mg/dL / Ketone: x  / Bili: x / Urobili: x   Blood: x / Protein: x / Nitrite: x   Leuk Esterase: x / RBC: x / WBC x   Sq Epi: x / Non Sq Epi: x / Bacteria: x      Magnesium: 2.0 mg/dL (12-02 @ 04:38)  Phosphorus: 6.3 mg/dL (12-02 @ 04:38)  Magnesium: 2.0 mg/dL (12-01 @ 15:56)          RADIOLOGY & ADDITIONAL TESTS:

## 2024-12-02 NOTE — INPATIENT CERTIFICATION FOR MEDICARE PATIENTS - RISKS OF ADVERSE EVENTS
Concern for renal deterioration POA  Pt today up to 15L  Also had superimposed bacterial PNA  Procal normalized w/ 7 days abx  U leg and U strep neg

## 2024-12-02 NOTE — PROGRESS NOTE ADULT - SUBJECTIVE AND OBJECTIVE BOX
JONATHAN GIBSON    pt is seen in am , he is feeling well   refusing HD today due for HD tomorrow     Vital Signs Last 24 Hrs  T(C): 36.5 (02 Dec 2024 11:13), Max: 37.4 (01 Dec 2024 16:52)  T(F): 97.7 (02 Dec 2024 11:13), Max: 99.4 (01 Dec 2024 16:52)  HR: 84 (02 Dec 2024 11:13) (84 - 118)  BP: 107/71 (02 Dec 2024 11:13) (107/71 - 174/94)  BP(mean): 120 (01 Dec 2024 22:31) (120 - 120)  RR: 18 (02 Dec 2024 11:13) (18 - 22)  SpO2: 98% (02 Dec 2024 11:13) (90% - 98%)    Parameters below as of 02 Dec 2024 11:13  Patient On (Oxygen Delivery Method): nasal cannula  O2 Flow (L/min): 3      CAPILLARY BLOOD GLUCOSE        PHYSICAL EXAMINATION:  ----------------------------------------  General awake alert   Neck supple   Lungs CTA   Heart regular s1 /s2   Abd soft no tenderness , BS +   ext No edema         LABORATORY STUDIES:  ----------------------------------------                        7.7    4.32  )-----------( 82       ( 02 Dec 2024 04:38 )             24.8     12-02    139  |  99  |  45.8[H]  ----------------------------<  120[H]  3.6   |  26.0  |  6.17[H]    Ca    8.3[L]      02 Dec 2024 04:38  Phos  6.3     12-02  Mg     2.0     12-02    TPro  6.0[L]  /  Alb  3.5  /  TBili  1.3  /  DBili  x   /  AST  6   /  ALT  7   /  AlkPhos  81  12-02    LIVER FUNCTIONS - ( 02 Dec 2024 04:38 )  Alb: 3.5 g/dL / Pro: 6.0 g/dL / ALK PHOS: 81 U/L / ALT: 7 U/L / AST: 6 U/L / GGT: x                       12-01-24 @ 07:01  -  12-02-24 @ 07:00  --------------------------------------------------------  IN: 458 mL / OUT: 350 mL / NET: 108 mL        Urinalysis Basic - ( 02 Dec 2024 04:38 )    Color: x / Appearance: x / SG: x / pH: x  Gluc: 120 mg/dL / Ketone: x  / Bili: x / Urobili: x   Blood: x / Protein: x / Nitrite: x   Leuk Esterase: x / RBC: x / WBC x   Sq Epi: x / Non Sq Epi: x / Bacteria: x          MEDICATIONS  (STANDING):  amLODIPine   Tablet 5 milliGRAM(s) Oral daily  aspirin enteric coated 81 milliGRAM(s) Oral daily  atorvastatin 40 milliGRAM(s) Oral at bedtime  carvedilol 6.25 milliGRAM(s) Oral every 12 hours  chlorhexidine 2% Cloths 1 Application(s) Topical <User Schedule>  cloNIDine 0.1 milliGRAM(s) Oral two times a day  hydrALAZINE 100 milliGRAM(s) Oral three times a day  isosorbide   mononitrate ER Tablet (IMDUR) 60 milliGRAM(s) Oral daily  levETIRAcetam 1000 milliGRAM(s) Oral two times a day  multivitamin 1 Tablet(s) Oral daily  pantoprazole    Tablet 40 milliGRAM(s) Oral before breakfast  sevelamer carbonate 800 milliGRAM(s) Oral three times a day with meals  tamsulosin 0.4 milliGRAM(s) Oral at bedtime  torsemide 20 milliGRAM(s) Oral daily    MEDICATIONS  (PRN):  acetaminophen     Tablet .. 650 milliGRAM(s) Oral every 6 hours PRN Temp greater or equal to 38C (100.4F), Mild Pain (1 - 3)  aluminum hydroxide/magnesium hydroxide/simethicone Suspension 30 milliLiter(s) Oral every 4 hours PRN Dyspepsia  melatonin 3 milliGRAM(s) Oral at bedtime PRN Insomnia  ondansetron Injectable 4 milliGRAM(s) IV Push every 8 hours PRN Nausea and/or Vomiting

## 2024-12-03 ENCOUNTER — TRANSCRIPTION ENCOUNTER (OUTPATIENT)
Age: 64
End: 2024-12-03

## 2024-12-03 LAB
-  BLOOD PCR PANEL: SIGNIFICANT CHANGE UP
ANION GAP SERPL CALC-SCNC: 14 MMOL/L — SIGNIFICANT CHANGE UP (ref 5–17)
BLD GP AB SCN SERPL QL: SIGNIFICANT CHANGE UP
BUN SERPL-MCNC: 55.4 MG/DL — HIGH (ref 8–20)
CALCIUM SERPL-MCNC: 7.7 MG/DL — LOW (ref 8.4–10.5)
CHLORIDE SERPL-SCNC: 99 MMOL/L — SIGNIFICANT CHANGE UP (ref 96–108)
CO2 SERPL-SCNC: 26 MMOL/L — SIGNIFICANT CHANGE UP (ref 22–29)
CREAT SERPL-MCNC: 7.63 MG/DL — HIGH (ref 0.5–1.3)
EGFR: 7 ML/MIN/1.73M2 — LOW
FERRITIN SERPL-MCNC: 374 NG/ML — SIGNIFICANT CHANGE UP (ref 30–400)
GLUCOSE SERPL-MCNC: 97 MG/DL — SIGNIFICANT CHANGE UP (ref 70–99)
GRAM STN FLD: ABNORMAL
HCT VFR BLD CALC: 23.2 % — LOW (ref 39–50)
HGB BLD-MCNC: 7.1 G/DL — LOW (ref 13–17)
IRON SATN MFR SERPL: 22 UG/DL — LOW (ref 59–158)
IRON SATN MFR SERPL: 8 % — LOW (ref 16–55)
MCHC RBC-ENTMCNC: 30.5 PG — SIGNIFICANT CHANGE UP (ref 27–34)
MCHC RBC-ENTMCNC: 30.6 G/DL — LOW (ref 32–36)
MCV RBC AUTO: 99.6 FL — SIGNIFICANT CHANGE UP (ref 80–100)
METHOD TYPE: SIGNIFICANT CHANGE UP
PLATELET # BLD AUTO: 81 K/UL — LOW (ref 150–400)
POTASSIUM SERPL-MCNC: 4 MMOL/L — SIGNIFICANT CHANGE UP (ref 3.5–5.3)
POTASSIUM SERPL-SCNC: 4 MMOL/L — SIGNIFICANT CHANGE UP (ref 3.5–5.3)
PROCALCITONIN SERPL-MCNC: 0.3 NG/ML — HIGH (ref 0.02–0.1)
RBC # BLD: 2.33 M/UL — LOW (ref 4.2–5.8)
RBC # FLD: 16.9 % — HIGH (ref 10.3–14.5)
SODIUM SERPL-SCNC: 139 MMOL/L — SIGNIFICANT CHANGE UP (ref 135–145)
TIBC SERPL-MCNC: 259 UG/DL — SIGNIFICANT CHANGE UP (ref 220–430)
TRANSFERRIN SERPL-MCNC: 181 MG/DL — SIGNIFICANT CHANGE UP (ref 180–329)
WBC # BLD: 4.8 K/UL — SIGNIFICANT CHANGE UP (ref 3.8–10.5)
WBC # FLD AUTO: 4.8 K/UL — SIGNIFICANT CHANGE UP (ref 3.8–10.5)

## 2024-12-03 PROCEDURE — 99233 SBSQ HOSP IP/OBS HIGH 50: CPT

## 2024-12-03 RX ORDER — MEROPENEM 500 MG/1
500 INJECTION, POWDER, FOR SOLUTION INTRAVENOUS EVERY 24 HOURS
Refills: 0 | Status: DISCONTINUED | OUTPATIENT
Start: 2024-12-03 | End: 2024-12-03

## 2024-12-03 RX ORDER — ALPRAZOLAM 0.5 MG
0.25 TABLET ORAL ONCE
Refills: 0 | Status: DISCONTINUED | OUTPATIENT
Start: 2024-12-03 | End: 2024-12-03

## 2024-12-03 RX ORDER — PANTOPRAZOLE SODIUM 40 MG/1
1 TABLET, DELAYED RELEASE ORAL
Qty: 30 | Refills: 0
Start: 2024-12-03 | End: 2025-01-01

## 2024-12-03 RX ORDER — IRON SUCROSE 20 MG/ML
200 INJECTION, SOLUTION INTRAVENOUS ONCE
Refills: 0 | Status: COMPLETED | OUTPATIENT
Start: 2024-12-03 | End: 2024-12-03

## 2024-12-03 RX ORDER — DIPHENHYDRAMINE HCL 25 MG
25 CAPSULE ORAL ONCE
Refills: 0 | Status: COMPLETED | OUTPATIENT
Start: 2024-12-03 | End: 2024-12-03

## 2024-12-03 RX ORDER — ACETAMINOPHEN, DIPHENHYDRAMINE HCL, PHENYLEPHRINE HCL 325; 25; 5 MG/1; MG/1; MG/1
1 TABLET ORAL
Qty: 0 | Refills: 0 | DISCHARGE
Start: 2024-12-03

## 2024-12-03 RX ORDER — MEROPENEM 500 MG/1
500 INJECTION, POWDER, FOR SOLUTION INTRAVENOUS EVERY 24 HOURS
Refills: 0 | Status: DISCONTINUED | OUTPATIENT
Start: 2024-12-03 | End: 2024-12-06

## 2024-12-03 RX ADMIN — HYDRALAZINE HYDROCHLORIDE 100 MILLIGRAM(S): 10 TABLET ORAL at 21:42

## 2024-12-03 RX ADMIN — ACETAMINOPHEN, DIPHENHYDRAMINE HCL, PHENYLEPHRINE HCL 3 MILLIGRAM(S): 325; 25; 5 TABLET ORAL at 23:00

## 2024-12-03 RX ADMIN — IRON SUCROSE 110 MILLIGRAM(S): 20 INJECTION, SOLUTION INTRAVENOUS at 13:22

## 2024-12-03 RX ADMIN — Medication 25 MILLIGRAM(S): at 00:52

## 2024-12-03 RX ADMIN — CLONIDINE HYDROCHLORIDE 0.1 MILLIGRAM(S): 0.3 TABLET ORAL at 06:56

## 2024-12-03 RX ADMIN — Medication 81 MILLIGRAM(S): at 13:26

## 2024-12-03 RX ADMIN — HYDRALAZINE HYDROCHLORIDE 100 MILLIGRAM(S): 10 TABLET ORAL at 13:23

## 2024-12-03 RX ADMIN — LEVETIRACETAM 1000 MILLIGRAM(S): 1000 TABLET ORAL at 18:22

## 2024-12-03 RX ADMIN — Medication 0.25 MILLIGRAM(S): at 10:08

## 2024-12-03 RX ADMIN — Medication 40 MILLIGRAM(S): at 21:40

## 2024-12-03 RX ADMIN — AMLODIPINE BESYLATE 5 MILLIGRAM(S): 10 TABLET ORAL at 06:55

## 2024-12-03 RX ADMIN — CHLORHEXIDINE GLUCONATE 1 APPLICATION(S): 1.2 RINSE ORAL at 05:51

## 2024-12-03 RX ADMIN — ERYTHROPOIETIN 10000 UNIT(S): 3000 INJECTION, SOLUTION INTRAVENOUS; SUBCUTANEOUS at 11:08

## 2024-12-03 RX ADMIN — Medication 1 TABLET(S): at 13:22

## 2024-12-03 RX ADMIN — Medication 20 MILLIGRAM(S): at 13:22

## 2024-12-03 RX ADMIN — CARVEDILOL 6.25 MILLIGRAM(S): 25 TABLET, FILM COATED ORAL at 18:22

## 2024-12-03 RX ADMIN — METHOCARBAMOL 500 MILLIGRAM(S): 500 TABLET, FILM COATED ORAL at 21:39

## 2024-12-03 RX ADMIN — SEVELAMER CARBONATE 800 MILLIGRAM(S): 800 TABLET, FILM COATED ORAL at 18:22

## 2024-12-03 RX ADMIN — CARVEDILOL 6.25 MILLIGRAM(S): 25 TABLET, FILM COATED ORAL at 06:56

## 2024-12-03 RX ADMIN — Medication 60 MILLIGRAM(S): at 13:22

## 2024-12-03 RX ADMIN — MEROPENEM 500 MILLIGRAM(S): 500 INJECTION, POWDER, FOR SOLUTION INTRAVENOUS at 18:21

## 2024-12-03 RX ADMIN — TAMSULOSIN HYDROCHLORIDE 0.4 MILLIGRAM(S): 0.4 CAPSULE ORAL at 21:40

## 2024-12-03 RX ADMIN — PANTOPRAZOLE SODIUM 40 MILLIGRAM(S): 40 TABLET, DELAYED RELEASE ORAL at 05:53

## 2024-12-03 RX ADMIN — SEVELAMER CARBONATE 800 MILLIGRAM(S): 800 TABLET, FILM COATED ORAL at 13:22

## 2024-12-03 RX ADMIN — LEVETIRACETAM 1000 MILLIGRAM(S): 1000 TABLET ORAL at 05:53

## 2024-12-03 RX ADMIN — CLONIDINE HYDROCHLORIDE 0.1 MILLIGRAM(S): 0.3 TABLET ORAL at 18:22

## 2024-12-03 NOTE — DISCHARGE NOTE NURSING/CASE MANAGEMENT/SOCIAL WORK - PATIENT PORTAL LINK FT
You can access the FollowMyHealth Patient Portal offered by [facility] by registering at the following website: [email or web address]. By joining [facility]’s FollowMyHealth portal, you will also be able to view your health information using other applications (apps) compatible with our system.
4

## 2024-12-03 NOTE — DISCHARGE NOTE PROVIDER - CARE PROVIDER_API CALL
Jared Rubin  Nephrology  [address] [place] [address]  Phone: [phone]  Fax: [phone]  Follow Up Time: 1 week    PCP,   Phone: (   )    -  Fax: (   )    -  Follow Up Time: 1 week   Jared Rubin  Nephrology  [address] [facility], [place] [address]  Phone: [phone]  Fax: [phone]  Follow Up Time: 1 week    PCP,   Phone: (   )    -  Fax: (   )    -  Follow Up Time: 1 week    Roosevelt Victor  Infectious Disease  [address] [facility] [address], [place] [address]  Phone: [phone]  Fax: [phone]  Follow Up Time: 2 weeks

## 2024-12-03 NOTE — DISCHARGE NOTE NURSING/CASE MANAGEMENT/SOCIAL WORK - NSDCCRNAME_GEN_ALL_CORE_FT
Vaccine Information Statement(s) was given today. This has been reviewed, questions answered, and verbal consent given by Patient for injection(s) and administration of Influenza (Inactivated).    Patient tolerated without incident. See immunization grid for documentation.        
[facility] Patient drives self and   no transport arrangements needed.

## 2024-12-03 NOTE — DIETITIAN INITIAL EVALUATION ADULT - PERTINENT MEDS FT
MEDICATIONS  (STANDING):  amLODIPine   Tablet 5 milliGRAM(s) Oral daily  aspirin enteric coated 81 milliGRAM(s) Oral daily  atorvastatin 40 milliGRAM(s) Oral at bedtime  carvedilol 6.25 milliGRAM(s) Oral every 12 hours  chlorhexidine 2% Cloths 1 Application(s) Topical <User Schedule>  cloNIDine 0.1 milliGRAM(s) Oral two times a day  epoetin yolanda-epbx (RETACRIT) Injectable 55138 Unit(s) IV Push <User Schedule>  hydrALAZINE 100 milliGRAM(s) Oral three times a day  isosorbide   mononitrate ER Tablet (IMDUR) 60 milliGRAM(s) Oral daily  levETIRAcetam 1000 milliGRAM(s) Oral two times a day  multivitamin 1 Tablet(s) Oral daily  pantoprazole    Tablet 40 milliGRAM(s) Oral before breakfast  sevelamer carbonate 800 milliGRAM(s) Oral three times a day with meals  tamsulosin 0.4 milliGRAM(s) Oral at bedtime  torsemide 20 milliGRAM(s) Oral daily    MEDICATIONS  (PRN):  acetaminophen     Tablet .. 650 milliGRAM(s) Oral every 6 hours PRN Temp greater or equal to 38C (100.4F), Mild Pain (1 - 3)  aluminum hydroxide/magnesium hydroxide/simethicone Suspension 30 milliLiter(s) Oral every 4 hours PRN Dyspepsia  melatonin 3 milliGRAM(s) Oral at bedtime PRN Insomnia  methocarbamol 500 milliGRAM(s) Oral three times a day PRN Muscle Spasm  ondansetron Injectable 4 milliGRAM(s) IV Push every 8 hours PRN Nausea and/or Vomiting

## 2024-12-03 NOTE — DIETITIAN INITIAL EVALUATION ADULT - PERTINENT LABORATORY DATA
12-03    139  |  99  |  55.4[H]  ----------------------------<  97  4.0   |  26.0  |  7.63[H]    Ca    7.7[L]      03 Dec 2024 07:45  Phos  6.3     12-02  Mg     2.0     12-02    TPro  6.0[L]  /  Alb  3.5  /  TBili  1.3  /  DBili  x   /  AST  6   /  ALT  7   /  AlkPhos  81  12-02  A1C with Estimated Average Glucose Result: 4.2 % (12-02-24 @ 04:38)  A1C with Estimated Average Glucose Result: 4.7 % (08-13-24 @ 10:16)  A1C with Estimated Average Glucose Result: 4.8 % (06-10-24 @ 13:30)

## 2024-12-03 NOTE — DISCHARGE NOTE NURSING/CASE MANAGEMENT/SOCIAL WORK - NSDCVIVACCINE_GEN_ALL_CORE_FT
influenza, injectable, quadrivalent, preservative free; 12-Dec-2020 09:28; Lynn Gerardo); [employer]; [id number] (Exp. Date: 30-Jun-2021); IntraMuscular; Deltoid Right.; 0.5 milliLiter(s); VIS (VIS Published: 15-Aug-2019, VIS Presented: 12-Dec-2020);

## 2024-12-03 NOTE — DISCHARGE NOTE NURSING/CASE MANAGEMENT/SOCIAL WORK - FINANCIAL ASSISTANCE
[facility] provides services at a reduced cost to those who are determined to be eligible through [facility]’s financial assistance program. Information regarding [facility]’s financial assistance program can be found by going to [email or web address] or by calling [phone].

## 2024-12-03 NOTE — DIETITIAN INITIAL EVALUATION ADULT - ORAL INTAKE PTA/DIET HISTORY
Pt found asleep, opened eyes, but lethargic throughout interview.  Reports eating well. Appears thin, under several blankets.  Adm weight 155lbs, prior adm 4/2024 204lbs, Sept 2023 179lbs, May 2023 192 lbs.  Unsure of accuracy/ scale/ recorder inconsistency

## 2024-12-03 NOTE — DISCHARGE NOTE PROVIDER - NSDCCPCAREPLAN_GEN_ALL_CORE_FT
PRINCIPAL DISCHARGE DIAGNOSIS  Diagnosis: Dyspnea  Assessment and Plan of Treatment: - Improved  - Follow up with Nephrology and PCP in 1 week      SECONDARY DISCHARGE DIAGNOSES  Diagnosis: Afib  Assessment and Plan of Treatment: - Continue medications as directed  - Follow up with PCP and Cardiology in 1 week    Diagnosis: ESRD on dialysis  Assessment and Plan of Treatment: - Continue dialysis as directed  - Follow up with Nephrology in 1 week     PRINCIPAL DISCHARGE DIAGNOSIS  Diagnosis: Dyspnea  Assessment and Plan of Treatment: possible fluid overload , diastolic CHF   Improved  - Follow up with Nephrology and cardiology         SECONDARY DISCHARGE DIAGNOSES  Diagnosis: Afib  Assessment and Plan of Treatment: - Continue medications as directed  - Follow up with PCP and Cardiology in 1 week    Diagnosis: ESRD on dialysis  Assessment and Plan of Treatment: - Continue dialysis as directed  - Follow up with Nephrology in 1 week    Diagnosis: Anemia of chronic disease  Assessment and Plan of Treatment: blood transfused with HD   take iron pills    Diagnosis: CAD (coronary artery disease)  Assessment and Plan of Treatment: cont home medicattion , follow up with cardiiologist    Diagnosis: Hypoxic respiratory failure  Assessment and Plan of Treatment: oxygen drop on exertion  use oxygen if your oxygen saturation less than 91     PRINCIPAL DISCHARGE DIAGNOSIS  Diagnosis: Acute respiratory failure with hypoxia  Assessment and Plan of Treatment: - In the Setting of Acute pulmonary edema Requiring HD  - Hypoxia now resolved, on RA  - HR controlled      SECONDARY DISCHARGE DIAGNOSES  Diagnosis: Afib  Assessment and Plan of Treatment: - Continue medications as directed  - Follow up with PCP and Cardiology in 1 week    Diagnosis: ESRD on dialysis  Assessment and Plan of Treatment: - Continue dialysis as directed  - Follow up with Nephrology in 1 week    Diagnosis: Anemia of chronic disease  Assessment and Plan of Treatment: blood transfused with HD   take iron pills    Diagnosis: CAD (coronary artery disease)  Assessment and Plan of Treatment: cont home medicattion , follow up with cardiiologist    Diagnosis: Bacteremia  Assessment and Plan of Treatment: Continue antibiotics as prescribed  Follow up with infectious disease Dr Lobo in 2 weeks

## 2024-12-03 NOTE — DISCHARGE NOTE PROVIDER - CARE PROVIDERS DIRECT ADDRESSES
,DirectAddress_Unknown,DirectAddress_Unknown ,DirectAddress_Unknown,DirectAddress_Unknown,[email or web address]

## 2024-12-03 NOTE — DISCHARGE NOTE PROVIDER - HOSPITAL COURSE
63 yo M w/ PMH Afib (s/p watchman, no longer on AC), ESRD (HD m/w/f; follows w/ Dr. Aparicio's group), chronic anemia, CAD (s/p CABG), aortic & mitral valve replacement, aortic dissection s/p repair, ischemic bowel s/p resection, seizure disorder (on keppra) presented c/o SOB/ZUNIGA with associated productive gray sputum and had 2 episodes of emesis that occurred after coughing yesterday.  Pt in AF RVR with rates in 120's. Initial work up significant for trop 96-->92, BNP >33K, EKG w/ no acute ischemic changes, Flu/RSV/Covid negative. CXR with increased pulmonary vascular congestion.  Pt is admitted for AF RVR.       1-SOB/ZUNIGA likely 2/2 vol overload 2/2 ESRD and rapid atrial fib   - Clinically vol euvolemic on exam but BNP>33K and CXR w/ increased pulm vascular congestion  HD per nephrology       2- AGMA with ESRD on HD     3- Anemia acute on cronic   -will tx 1 unot PRBC with next HD   -    4-SIRS w/ tachypnea and tachycardia   - Will order UA and blood cultures   - Will check lactate  - Flu/RSV/Covid negative   - CXR w/ pulm vascular congestion but no consolidation or infiltrate  - No leukocytosis or bandemia       5-Afib w  RVR  - Rate improving and now in 80's-90's  - s/p watchman, off AC   - cont BB for rate control   better today     6-Type II MI, likely poor renal clearance in setting of ESRD vs demand 2/2 above  - Repeat trop down trending (96-->92)  - BNP >33K likely from vol overload from ESRD  - Given recent TTE in 09/2024       7-CAD (s/p CABG)  8-HTN / HLD  - c/w carvedilol, amlodipine, torsemide, isosorbide, hydralazine, clonidine   - c/w statin therapy   - c/w ASA      9-Seizure disorder  - c/w keppra         VTE ppx: SCDs    Dispo: Acute.  Anticipate d/gerardo 24 hours likely          65 yo M w/ PMH Afib (s/p watchman, no longer on AC), ESRD (HD m/w/f; follows w/ Dr. Aparicio's group), chronic anemia, CAD (s/p CABG), aortic & mitral valve replacement, aortic dissection s/p repair, ischemic bowel s/p resection, seizure disorder (on keppra) presented c/o SOB/ZUNIGA with associated productive gray sputum and had 2 episodes of emesis that occurred after coughing yesterday.  Pt in AF RVR with rates in 120's. Initial work up significant for trop 96-->92, BNP >33K, EKG w/ no acute ischemic changes, Flu/RSV/Covid negative. CXR with increased pulmonary vascular congestion.  Pt is admitted for AF RVR and SOB 2/2 fluid overload 2/2 ESRD. Nephrology was consulted. Pt was dialyzed with improvement and HR now improved with medication adjustments. Pt meeting criteria for SIRS. Cultures ordered and remain ngtd. Pt has since been medically optimized and is now medically stable for discharge with appropriate outpatient follow up.     All electrolyte abnormalities were monitored carefully and repleted as necessary during this hospitalization. At the time of discharge patient was hemodynamically stable and amenable to all terms of discharge.      65 yo M w/ PMH Afib (s/p watchman, no longer on AC), ESRD (HD m/w/f; follows w/ Dr. Aparicio's group), chronic anemia, CAD (s/p CABG), aortic & mitral valve replacement, aortic dissection s/p repair, ischemic bowel s/p resection, seizure disorder (on keppra) presented c/o SOB/ZUNIGA with associated productive gray sputum and had 2 episodes of emesis that occurred after coughing yesterday.  Pt in AF RVR with rates in 120's. Initial work up significant for trop 96-->92, BNP >33K, EKG w/ no acute ischemic changes, Flu/RSV/Covid negative. CXR with increased pulmonary vascular congestion.  Pt is admitted for AF RVR and SOB 2/2 fluid overload 2/2 ESRD. Nephrology was consulted. Pt was dialyzed with improvement and HR now improved with medication adjustments. Pt meeting criteria for SIRS. However no sign of infection ,cx are negative  continued HD per schedule , Hb 7.1 blood tx given during HD , he is with intermittent hypoxia ,pulse ox on exertion 88 % home oxygen ordered    Pt has since been medically optimized and is now medically stable for discharge with appropriate outpatient follow up.      65 yo M w/ PMH Afib (s/p watchman, no longer on AC), ESRD (HD m/w/f; follows w/ Dr. Aparicio's group), chronic anemia, CAD (s/p CABG), aortic & mitral valve replacement, aortic dissection s/p repair, ischemic bowel s/p resection, seizure disorder (on keppra) presented c/o SOB/ZUNIGA with associated productive gray sputum and had 2 episodes of emesis that occurred after coughing yesterday.  Pt in AF RVR with rates in 120's. Initial work up significant for trop 96-->92, BNP >33K, EKG w/ no acute ischemic changes, Flu/RSV/Covid negative. CXR with increased pulmonary vascular congestion.  Pt is admitted for AF RVR and SOB 2/2 fluid overload 2/2 ESRD. Nephrology was consulted. Pt was dialyzed with improvement and HR now improved with medication adjustments. Pt meeting criteria for SIRS.Blood cx 12/2 garam negative rods , repeat cx ordered ID consulted Pt is afebrile   UA negative   CXR no pneumonia  continued HD per schedule , Hb 7.1 blood tx given during HD 12/3 , he is with intermittent hypoxia ,pulse ox on exertion 88 % home oxygen ordered    Pt has since been medically optimized and is now medically stable for discharge with appropriate outpatient follow up.      The patient is a 64 year old male with a past medical history of Afib status post  watchman, no longer on AC,  ESRD on HD MWF, chronic anemia, CAD (s/p CABG), aortic & mitral valve replacement, aortic dissection s/p repair, ischemic bowel s/p resection, seizure disorder (on keppra), IE in 2023 who  presented c/o SOB/ZUNIGA w/ associated productive gray sputum and had 2 episodes of NBNB emesis that occurred after coughing. Patient admitted for acute hypoxia with Afib with RVR in the setting of acute pulmonary edema requiring HD. Hypoxia resolved. SIRS on admission, blood cultures positive for GNR. ID consulted. TTE ordered negative for IE. CT abdomen and pelvis with non specific adenopathy no fluid collection. History of sub total colectomy. Recent colonoscopy on 11/11 reviewed. Repeat blood cultures negative x 2. Final blood cultures positive for Bacteroides vulgatas. Discussed with ID will treat with Vantin and Flagyl for 14 days with outpatient ID follow up .

## 2024-12-03 NOTE — PROGRESS NOTE ADULT - SUBJECTIVE AND OBJECTIVE BOX
NEPHROLOGY INTERVAL HPI/OVERNIGHT EVENTS:    Seen on HD   Feels better  Getting blood transfusion   Refuses to allow fluid removal     MEDICATIONS  (STANDING):  amLODIPine   Tablet 5 milliGRAM(s) Oral daily  aspirin enteric coated 81 milliGRAM(s) Oral daily  atorvastatin 40 milliGRAM(s) Oral at bedtime  carvedilol 6.25 milliGRAM(s) Oral every 12 hours  chlorhexidine 2% Cloths 1 Application(s) Topical <User Schedule>  cloNIDine 0.1 milliGRAM(s) Oral two times a day  epoetin yolanda-epbx (RETACRIT) Injectable 69689 Unit(s) IV Push <User Schedule>  hydrALAZINE 100 milliGRAM(s) Oral three times a day  isosorbide   mononitrate ER Tablet (IMDUR) 60 milliGRAM(s) Oral daily  levETIRAcetam 1000 milliGRAM(s) Oral two times a day  multivitamin 1 Tablet(s) Oral daily  pantoprazole    Tablet 40 milliGRAM(s) Oral before breakfast  sevelamer carbonate 800 milliGRAM(s) Oral three times a day with meals  tamsulosin 0.4 milliGRAM(s) Oral at bedtime  torsemide 20 milliGRAM(s) Oral daily    MEDICATIONS  (PRN):  acetaminophen     Tablet .. 650 milliGRAM(s) Oral every 6 hours PRN Temp greater or equal to 38C (100.4F), Mild Pain (1 - 3)  aluminum hydroxide/magnesium hydroxide/simethicone Suspension 30 milliLiter(s) Oral every 4 hours PRN Dyspepsia  melatonin 3 milliGRAM(s) Oral at bedtime PRN Insomnia  methocarbamol 500 milliGRAM(s) Oral three times a day PRN Muscle Spasm  ondansetron Injectable 4 milliGRAM(s) IV Push every 8 hours PRN Nausea and/or Vomiting      Allergies    penicillin (Other)    Intolerances          Vital Signs Last 24 Hrs  T(C): 36.4 (03 Dec 2024 11:30), Max: 36.7 (03 Dec 2024 05:10)  T(F): 97.6 (03 Dec 2024 11:30), Max: 98 (03 Dec 2024 05:10)  HR: 99 (03 Dec 2024 11:30) (90 - 105)  BP: 137/77 (03 Dec 2024 11:30) (109/68 - 171/104)  BP(mean): 90 (02 Dec 2024 19:15) (81 - 90)  RR: 18 (03 Dec 2024 11:30) (18 - 18)  SpO2: 99% (03 Dec 2024 11:30) (92% - 99%)    Parameters below as of 03 Dec 2024 11:30  Patient On (Oxygen Delivery Method): nasal cannula      Daily     Daily   I&O's Detail    03 Dec 2024 07:01  -  03 Dec 2024 13:56  --------------------------------------------------------  IN:  Total IN: 0 mL    OUT:    Other (mL): 1400 mL  Total OUT: 1400 mL    Total NET: -1400 mL        I&O's Summary    03 Dec 2024 07:01  -  03 Dec 2024 13:56  --------------------------------------------------------  IN: 0 mL / OUT: 1400 mL / NET: -1400 mL        PHYSICAL EXAM:    GENERAL: NAD,  HEAD:  Atraumatic, Normocephalic , no edema   NECK: Supple, No JVD,   NERVOUS SYSTEM:  Alert & Oriented X3,  CHEST/LUNG: EAE , No wheeze   HEART: Regular rate and rhythm; No gallop or rub   ABDOMEN: Soft, Nontender, Nondistended; Bowel sounds present  EXTREMITIES:  no sig edema . L access w/ thrill     LABS:                        7.1    4.80  )-----------( 81       ( 03 Dec 2024 07:45 )             23.2     12-03    139  |  99  |  55.4[H]  ----------------------------<  97  4.0   |  26.0  |  7.63[H]    Ca    7.7[L]      03 Dec 2024 07:45  Phos  6.3     12-02  Mg     2.0     12-02    TPro  6.0[L]  /  Alb  3.5  /  TBili  1.3  /  DBili  x   /  AST  6   /  ALT  7   /  AlkPhos  81  12-02      Urinalysis Basic - ( 03 Dec 2024 07:45 )    Color: x / Appearance: x / SG: x / pH: x  Gluc: 97 mg/dL / Ketone: x  / Bili: x / Urobili: x   Blood: x / Protein: x / Nitrite: x   Leuk Esterase: x / RBC: x / WBC x   Sq Epi: x / Non Sq Epi: x / Bacteria: x              RADIOLOGY & ADDITIONAL TESTS:

## 2024-12-03 NOTE — PROGRESS NOTE ADULT - ASSESSMENT
65 y/o M w/ PMH Afib (s/p watchman, no longer on AC), ESRD (HD m/w/f; follows w/ Dr. Aparicio's group), chronic anemia, CAD (s/p CABG), aortic & mitral valve replacement, aortic dissection s/p repair, ischemic bowel s/p resection, seizure disorder (on keppra) presented c/o SOB/ZUNIGA w/ associated productive gray sputum and had 2 episodes of NBNB emesis that occurred after coughing yesterday.  Pt in AF RVR w/ rates in 120's.   Initial w/u significant for trop 96-->92, BNP >33K, EKG w/ no acute ischemic changes, Flu/RSV/Covid negative.  CXR w/ increased pulm vascular congestion.  Pt is admitted for  AF RVR.       1-SOB/ZUNIGA likely 2/2 vol overload 2/2 ESRD and rapid atrial fib   - Clinically vol euvolemic on exam   s/p HD today   cont lasix   pulse o 88 % on ambulation need home oxygen     2- Gram neg bacteremia with -SIRS on admission   afebrile   will repeat blood cx   start iv zosyn   ID evaluation   UA neg    3-AGMA with ESRD on HD   cont per schedule   monitor cr and lytes     4-- Anemia acute on cronic   -will tx 1 unot PRBC with next HD   -recent GI work up bleed   s/p 1 unit PRBC with HD today   low iron added iv venofer   cont po iron       5-Afib w  RVR with Watchman of AC   rate better   - cont BB for rate control       6-CAD (s/p CABG)    7-HTN / HLD  - c/w carvedilol, amlodipine, torsemide, isosorbide, hydralazine, clonidine   - c/w statin therapy   - c/w ASA    8-Seizure disorder  - c/w keppra         VTE ppx: SCDs    Dispo pending repeat blood cx and ID input   + bacteremia need IV abx abd further monitoring

## 2024-12-03 NOTE — DIETITIAN INITIAL EVALUATION ADULT - NS FNS DIET ORDER
Diet, Renal Restrictions:   For patients receiving Renal Replacement - No Protein Restr, No Conc K, No Conc Phos, Low Sodium  DASH/TLC {Sodium & Cholesterol Restricted} (DASH)  1500mL Fluid Restriction ([id number]) (12-01-24 @ 19:50)

## 2024-12-03 NOTE — DISCHARGE NOTE PROVIDER - NSDCFUSCHEDAPPT_GEN_ALL_CORE_FT
Felice Frankel  [facility] Physician [facility]  CARDIOLOGY [address]  Scheduled Appointment: 12/17/2024

## 2024-12-03 NOTE — PROGRESS NOTE ADULT - ASSESSMENT
ESRD - Some PVC on CXR  Had full HD Sat in [facility]  REFUSING additional HD   Says he justs wants to go home , feels much better   HD set up for today    Anemia - Recent colonoscopy few weeks ago   Hgb noted   Will provide Retacrit with HD   Transfusion with HD today     HTN - Resumed meds     RO - Renvela resumed     Will follow

## 2024-12-03 NOTE — DISCHARGE NOTE PROVIDER - PROVIDER TOKENS
PROVIDER:[TOKEN:[[id number]],FOLLOWUP:[1 week]],FREE:[LAST:[PCP],PHONE:[(   )    -],FAX:[(   )    -],FOLLOWUP:[1 week]] PROVIDER:[TOKEN:[[id number]],FOLLOWUP:[1 week]],FREE:[LAST:[PCP],PHONE:[(   )    -],FAX:[(   )    -],FOLLOWUP:[1 week]],PROVIDER:[TOKEN:[[id number]],FOLLOWUP:[2 weeks]]

## 2024-12-03 NOTE — DIETITIAN INITIAL EVALUATION ADULT - OTHER INFO
63 y/o M w/ PMH Afib (s/p watchman, no longer on AC), ESRD (HD m/w/f; follows w/ Dr. Aparicio's group), chronic anemia, CAD (s/p CABG), aortic & mitral valve replacement, aortic dissection s/p repair, ischemic bowel s/p resection, seizure disorder (on keppra) presented c/o SOB/ZUNIGA w/ associated productive gray sputum and had 2 episodes of NBNB emesis that occurred after coughing yesterday.  Pt in AF RVR w/ rates in 120's.   Initial w/u significant for trop 96-->92, BNP >33K, EKG w/ no acute ischemic changes, Flu/RSV/Covid negative.  CXR w/ increased pulm vascular congestion.  Pt is admitted for  AF RVR.   SOB/ZUNIGA likely 2/2 vol overload 2/2 ESRD and rapid atrial fib

## 2024-12-03 NOTE — PROGRESS NOTE ADULT - SUBJECTIVE AND OBJECTIVE BOX
Patient is a 64y old  Male who presents with a chief complaint of AF RVR (03 Dec 2024 13:56)      Patient seen and examined at bedside after HD feeling well   no complaints   blood cx resulted this afternoon positive gram neg rods  will defer DC   Id consult called         ALLERGIES:  penicillin (Other)    MEDICATIONS  (STANDING):  amLODIPine   Tablet 5 milliGRAM(s) Oral daily  aspirin enteric coated 81 milliGRAM(s) Oral daily  atorvastatin 40 milliGRAM(s) Oral at bedtime  carvedilol 6.25 milliGRAM(s) Oral every 12 hours  chlorhexidine 2% Cloths 1 Application(s) Topical <User Schedule>  cloNIDine 0.1 milliGRAM(s) Oral two times a day  epoetin yolanda-epbx (RETACRIT) Injectable 26125 Unit(s) IV Push <User Schedule>  hydrALAZINE 100 milliGRAM(s) Oral three times a day  isosorbide   mononitrate ER Tablet (IMDUR) 60 milliGRAM(s) Oral daily  levETIRAcetam 1000 milliGRAM(s) Oral two times a day  multivitamin 1 Tablet(s) Oral daily  pantoprazole    Tablet 40 milliGRAM(s) Oral before breakfast  sevelamer carbonate 800 milliGRAM(s) Oral three times a day with meals  tamsulosin 0.4 milliGRAM(s) Oral at bedtime  torsemide 20 milliGRAM(s) Oral daily    MEDICATIONS  (PRN):  acetaminophen     Tablet .. 650 milliGRAM(s) Oral every 6 hours PRN Temp greater or equal to 38C (100.4F), Mild Pain (1 - 3)  aluminum hydroxide/magnesium hydroxide/simethicone Suspension 30 milliLiter(s) Oral every 4 hours PRN Dyspepsia  melatonin 3 milliGRAM(s) Oral at bedtime PRN Insomnia  methocarbamol 500 milliGRAM(s) Oral three times a day PRN Muscle Spasm  ondansetron Injectable 4 milliGRAM(s) IV Push every 8 hours PRN Nausea and/or Vomiting    Vital Signs Last 24 Hrs  T(F): 97.6 (03 Dec 2024 11:30), Max: 98 (03 Dec 2024 05:10)  HR: 99 (03 Dec 2024 11:30) (90 - 105)  BP: 137/77 (03 Dec 2024 11:30) (109/68 - 171/104)  RR: 18 (03 Dec 2024 11:30) (18 - 18)  SpO2: 99% (03 Dec 2024 11:30) (92% - 99%)  I&O's Summary    03 Dec 2024 07:01  -  03 Dec 2024 15:12  --------------------------------------------------------  IN: 0 mL / OUT: 1400 mL / NET: -1400 mL        PHYSICAL EXAM:  General:   ENT:   Neck:   Lungs:   Cardio: RRR, S1/S2, No murmur  Abdomen: Soft, Nontender, Nondistended; Bowel sounds present  Extremities: No calf tenderness, No pitting edema    LABS:                        7.1    4.80  )-----------( 81       ( 03 Dec 2024 07:45 )             23.2     12-03    139  |  99  |  55.4  ----------------------------<  97  4.0   |  26.0  |  7.63    Ca    7.7      03 Dec 2024 07:45  Phos  6.3     12-02  Mg     2.0     12-02    TPro  6.0  /  Alb  3.5  /  TBili  1.3  /  DBili  x   /  AST  6   /  ALT  7   /  AlkPhos  81  12-02            Lactate, Blood: 1.1 mmol/L (12-02 @ 00:05)          12-02 Chol 92 mg/dL LDL -- HDL 45 mg/dL Trig 52 mg/dL  TSH 2.27   TSH with FT4 reflex --  Total T3 --                  Urinalysis Basic - ( 03 Dec 2024 07:45 )    Color: x / Appearance: x / SG: x / pH: x  Gluc: 97 mg/dL / Ketone: x  / Bili: x / Urobili: x   Blood: x / Protein: x / Nitrite: x   Leuk Esterase: x / RBC: x / WBC x   Sq Epi: x / Non Sq Epi: x / Bacteria: x        Culture - Blood (collected 02 Dec 2024 00:05)  Source: .Blood BLOOD  Gram Stain (03 Dec 2024 14:33):    Growth in anaerobic bottle: Gram Negative Rods  Preliminary Report (03 Dec 2024 14:33):    Growth in anaerobic bottle: Gram Negative Rods    Direct identification is available within approximately 3-5    hours either by Blood Panel Multiplexed PCR or Direct    MALDI-TOF. Details: [email or web address]        RADIOLOGY & ADDITIONAL TESTS:       Patient is a 64y old  Male who presents with a chief complaint of AF RVR (03 Dec 2024 13:56)      Patient seen and examined at bedside after HD feeling well   no complaints   blood cx resulted this afternoon positive gram neg rods  will defer DC   Id consult called         ALLERGIES:  penicillin (Other)    MEDICATIONS  (STANDING):  amLODIPine   Tablet 5 milliGRAM(s) Oral daily  aspirin enteric coated 81 milliGRAM(s) Oral daily  atorvastatin 40 milliGRAM(s) Oral at bedtime  carvedilol 6.25 milliGRAM(s) Oral every 12 hours  chlorhexidine 2% Cloths 1 Application(s) Topical <User Schedule>  cloNIDine 0.1 milliGRAM(s) Oral two times a day  epoetin yoalnda-epbx (RETACRIT) Injectable 10901 Unit(s) IV Push <User Schedule>  hydrALAZINE 100 milliGRAM(s) Oral three times a day  isosorbide   mononitrate ER Tablet (IMDUR) 60 milliGRAM(s) Oral daily  levETIRAcetam 1000 milliGRAM(s) Oral two times a day  multivitamin 1 Tablet(s) Oral daily  pantoprazole    Tablet 40 milliGRAM(s) Oral before breakfast  sevelamer carbonate 800 milliGRAM(s) Oral three times a day with meals  tamsulosin 0.4 milliGRAM(s) Oral at bedtime  torsemide 20 milliGRAM(s) Oral daily    MEDICATIONS  (PRN):  acetaminophen     Tablet .. 650 milliGRAM(s) Oral every 6 hours PRN Temp greater or equal to 38C (100.4F), Mild Pain (1 - 3)  aluminum hydroxide/magnesium hydroxide/simethicone Suspension 30 milliLiter(s) Oral every 4 hours PRN Dyspepsia  melatonin 3 milliGRAM(s) Oral at bedtime PRN Insomnia  methocarbamol 500 milliGRAM(s) Oral three times a day PRN Muscle Spasm  ondansetron Injectable 4 milliGRAM(s) IV Push every 8 hours PRN Nausea and/or Vomiting    Vital Signs Last 24 Hrs  T(F): 97.6 (03 Dec 2024 11:30), Max: 98 (03 Dec 2024 05:10)  HR: 99 (03 Dec 2024 11:30) (90 - 105)  BP: 137/77 (03 Dec 2024 11:30) (109/68 - 171/104)  RR: 18 (03 Dec 2024 11:30) (18 - 18)  SpO2: 99% (03 Dec 2024 11:30) (92% - 99%)  I&O's Summary    03 Dec 2024 07:01  -  03 Dec 2024 15:12  --------------------------------------------------------  IN: 0 mL / OUT: 1400 mL / NET: -1400 mL        PHYSICAL EXAM:  General: awake alert   Neck: supple , no JVD  Lungs: CTA bilateral   Cardio: RRR, S1/S2, No murmur  Abdomen: Soft, Nontender, Nondistended; Bowel sounds present  Extremities:  No pitting edema    LABS:                        7.1    4.80  )-----------( 81       ( 03 Dec 2024 07:45 )             23.2     12-03    139  |  99  |  55.4  ----------------------------<  97  4.0   |  26.0  |  7.63    Ca    7.7      03 Dec 2024 07:45  Phos  6.3     12-02  Mg     2.0     12-02    TPro  6.0  /  Alb  3.5  /  TBili  1.3  /  DBili  x   /  AST  6   /  ALT  7   /  AlkPhos  81  12-02            Lactate, Blood: 1.1 mmol/L (12-02 @ 00:05)          12-02 Chol 92 mg/dL LDL -- HDL 45 mg/dL Trig 52 mg/dL  TSH 2.27   TSH with FT4 reflex --  Total T3 --                  Urinalysis Basic - ( 03 Dec 2024 07:45 )    Color: x / Appearance: x / SG: x / pH: x  Gluc: 97 mg/dL / Ketone: x  / Bili: x / Urobili: x   Blood: x / Protein: x / Nitrite: x   Leuk Esterase: x / RBC: x / WBC x   Sq Epi: x / Non Sq Epi: x / Bacteria: x        Culture - Blood (collected 02 Dec 2024 00:05)  Source: .Blood BLOOD  Gram Stain (03 Dec 2024 14:33):    Growth in anaerobic bottle: Gram Negative Rods  Preliminary Report (03 Dec 2024 14:33):    Growth in anaerobic bottle: Gram Negative Rods    Direct identification is available within approximately 3-5    hours either by Blood Panel Multiplexed PCR or Direct    MALDI-TOF. Details: [email or web address]        RADIOLOGY & ADDITIONAL TESTS:

## 2024-12-03 NOTE — DISCHARGE NOTE PROVIDER - NSDCMRMEDTOKEN_GEN_ALL_CORE_FT
amLODIPine 5 mg oral tablet: 1 tab(s) orally once a day  Aspi-Cor 81 mg oral delayed release tablet: 1 tab(s) orally once a day  atorvastatin 40 mg oral tablet: 1 tab(s) orally once a day (at bedtime)  carvedilol 6.25 mg oral tablet: 1 tab(s) orally every 12 hours  cloNIDine 0.1 mg oral tablet: 1 tab(s) orally 2 times a day  hydrALAZINE 100 mg oral tablet: 1 tab(s) orally 3 times a day  isosorbide mononitrate 60 mg oral tablet, extended release: 1 tab(s) orally once a day  levETIRAcetam 1000 mg oral tablet: 1 tab(s) orally 2 times a day  Nephro-Russel oral tablet: 1 tab(s) orally once a day  risankizumab: every 3 weeks  sevelamer carbonate 800 mg oral tablet: 1 tab(s) orally 3 times a day (with meals)  tamsulosin 0.4 mg oral capsule: 1 cap(s) orally once a day (at bedtime)  torsemide 20 mg oral tablet: 1 tab(s) orally once a day   amLODIPine 5 mg oral tablet: 1 tab(s) orally once a day  Aspi-Cor 81 mg oral delayed release tablet: 1 tab(s) orally once a day  atorvastatin 40 mg oral tablet: 1 tab(s) orally once a day (at bedtime)  carvedilol 6.25 mg oral tablet: 1 tab(s) orally every 12 hours  cloNIDine 0.1 mg oral tablet: 1 tab(s) orally 2 times a day  hydrALAZINE 100 mg oral tablet: 1 tab(s) orally 3 times a day  isosorbide mononitrate 60 mg oral tablet, extended release: 1 tab(s) orally once a day  levETIRAcetam 1000 mg oral tablet: 1 tab(s) orally 2 times a day  melatonin 3 mg oral tablet: 1 tab(s) orally once a day (at bedtime) As needed Insomnia  Nephro-Russel oral tablet: 1 tab(s) orally once a day  pantoprazole 40 mg oral delayed release tablet: 1 tab(s) orally once a day (before a meal)  risankizumab: every 3 weeks  sevelamer carbonate 800 mg oral tablet: 1 tab(s) orally 3 times a day (with meals)  tamsulosin 0.4 mg oral capsule: 1 cap(s) orally once a day (at bedtime)  torsemide 20 mg oral tablet: 1 tab(s) orally once a day   amLODIPine 5 mg oral tablet: 1 tab(s) orally once a day  Aspi-Cor 81 mg oral delayed release tablet: 1 tab(s) orally once a day  atorvastatin 40 mg oral tablet: 1 tab(s) orally once a day (at bedtime)  cloNIDine 0.1 mg oral tablet: 1 tab(s) orally 2 times a day  hydrALAZINE 100 mg oral tablet: 1 tab(s) orally 3 times a day  isosorbide mononitrate 60 mg oral tablet, extended release: 1 tab(s) orally once a day  levETIRAcetam 1000 mg oral tablet: 1 tab(s) orally 2 times a day  melatonin 3 mg oral tablet: 1 tab(s) orally once a day (at bedtime) As needed Insomnia  Nephro-Russel oral tablet: 1 tab(s) orally once a day  pantoprazole 40 mg oral delayed release tablet: 1 tab(s) orally once a day (before a meal)  risankizumab: every 3 weeks  sevelamer carbonate 800 mg oral tablet: 1 tab(s) orally 3 times a day (with meals)  tamsulosin 0.4 mg oral capsule: 1 cap(s) orally once a day (at bedtime)  torsemide 20 mg oral tablet: 1 tab(s) orally once a day

## 2024-12-03 NOTE — DISCHARGE NOTE PROVIDER - ATTENDING DISCHARGE PHYSICAL EXAMINATION:
pt is seen post HD , he is feeling well , hypoxic during HD session oxygen applied now on RA   ambulation ox sat 88% , nurse note reviewed   Pt is with anemia tx given , no bleed from rectum     Vital Signs Last 24 Hrs  T(C): 36.4 (03 Dec 2024 11:30), Max: 36.7 (03 Dec 2024 05:10)  T(F): 97.6 (03 Dec 2024 11:30), Max: 98 (03 Dec 2024 05:10)  HR: 99 (03 Dec 2024 11:30) (90 - 105)  BP: 137/77 (03 Dec 2024 11:30) (109/68 - 171/104)  BP(mean): 90 (02 Dec 2024 19:15) (81 - 90)  RR: 18 (03 Dec 2024 11:30) (18 - 18)  SpO2: 99% (03 Dec 2024 11:30) (92% - 99%)    Parameters below as of 03 Dec 2024 11:30  Patient On (Oxygen Delivery Method): nasal cannula    Lungs CTA   heart regular s1 /s2   Abd soft no tenderness , Bs +   Ext no edema

## 2024-12-04 ENCOUNTER — RESULT REVIEW (OUTPATIENT)
Age: 64
End: 2024-12-04

## 2024-12-04 LAB
CULTURE RESULTS: ABNORMAL
ORGANISM # SPEC MICROSCOPIC CNT: ABNORMAL
ORGANISM # SPEC MICROSCOPIC CNT: SIGNIFICANT CHANGE UP
SPECIMEN SOURCE: SIGNIFICANT CHANGE UP

## 2024-12-04 PROCEDURE — 99223 1ST HOSP IP/OBS HIGH 75: CPT

## 2024-12-04 PROCEDURE — 93306 TTE W/DOPPLER COMPLETE: CPT | Mod: 26

## 2024-12-04 PROCEDURE — 99233 SBSQ HOSP IP/OBS HIGH 50: CPT

## 2024-12-04 PROCEDURE — 74176 CT ABD & PELVIS W/O CONTRAST: CPT | Mod: 26

## 2024-12-04 RX ORDER — IRON SUCROSE 20 MG/ML
200 INJECTION, SOLUTION INTRAVENOUS EVERY 24 HOURS
Refills: 0 | Status: DISCONTINUED | OUTPATIENT
Start: 2024-12-04 | End: 2024-12-05

## 2024-12-04 RX ORDER — ACETAMINOPHEN, DIPHENHYDRAMINE HCL, PHENYLEPHRINE HCL 325; 25; 5 MG/1; MG/1; MG/1
3 TABLET ORAL ONCE
Refills: 0 | Status: COMPLETED | OUTPATIENT
Start: 2024-12-04 | End: 2024-12-04

## 2024-12-04 RX ORDER — ALPRAZOLAM 0.5 MG
0.5 TABLET ORAL DAILY
Refills: 0 | Status: DISCONTINUED | OUTPATIENT
Start: 2024-12-04 | End: 2024-12-06

## 2024-12-04 RX ORDER — ACETAMINOPHEN, DIPHENHYDRAMINE HCL, PHENYLEPHRINE HCL 325; 25; 5 MG/1; MG/1; MG/1
3 TABLET ORAL ONCE
Refills: 0 | Status: COMPLETED | OUTPATIENT
Start: 2024-12-04 | End: 2024-12-05

## 2024-12-04 RX ORDER — CARVEDILOL 25 MG/1
12.5 TABLET, FILM COATED ORAL EVERY 12 HOURS
Refills: 0 | Status: DISCONTINUED | OUTPATIENT
Start: 2024-12-04 | End: 2024-12-06

## 2024-12-04 RX ORDER — ALPRAZOLAM 0.5 MG
0.25 TABLET ORAL ONCE
Refills: 0 | Status: DISCONTINUED | OUTPATIENT
Start: 2024-12-04 | End: 2024-12-04

## 2024-12-04 RX ADMIN — CHLORHEXIDINE GLUCONATE 1 APPLICATION(S): 1.2 RINSE ORAL at 08:31

## 2024-12-04 RX ADMIN — CLONIDINE HYDROCHLORIDE 0.1 MILLIGRAM(S): 0.3 TABLET ORAL at 08:34

## 2024-12-04 RX ADMIN — Medication 81 MILLIGRAM(S): at 12:27

## 2024-12-04 RX ADMIN — HYDRALAZINE HYDROCHLORIDE 100 MILLIGRAM(S): 10 TABLET ORAL at 13:26

## 2024-12-04 RX ADMIN — IRON SUCROSE 110 MILLIGRAM(S): 20 INJECTION, SOLUTION INTRAVENOUS at 17:05

## 2024-12-04 RX ADMIN — Medication 40 MILLIGRAM(S): at 22:37

## 2024-12-04 RX ADMIN — HYDRALAZINE HYDROCHLORIDE 100 MILLIGRAM(S): 10 TABLET ORAL at 22:38

## 2024-12-04 RX ADMIN — ACETAMINOPHEN, DIPHENHYDRAMINE HCL, PHENYLEPHRINE HCL 3 MILLIGRAM(S): 325; 25; 5 TABLET ORAL at 01:28

## 2024-12-04 RX ADMIN — MEROPENEM 500 MILLIGRAM(S): 500 INJECTION, POWDER, FOR SOLUTION INTRAVENOUS at 17:05

## 2024-12-04 RX ADMIN — Medication 0.25 MILLIGRAM(S): at 02:50

## 2024-12-04 RX ADMIN — Medication 60 MILLIGRAM(S): at 12:37

## 2024-12-04 RX ADMIN — TAMSULOSIN HYDROCHLORIDE 0.4 MILLIGRAM(S): 0.4 CAPSULE ORAL at 22:37

## 2024-12-04 RX ADMIN — Medication 1 TABLET(S): at 12:27

## 2024-12-04 RX ADMIN — LEVETIRACETAM 1000 MILLIGRAM(S): 1000 TABLET ORAL at 08:35

## 2024-12-04 RX ADMIN — PANTOPRAZOLE SODIUM 40 MILLIGRAM(S): 40 TABLET, DELAYED RELEASE ORAL at 08:33

## 2024-12-04 RX ADMIN — CLONIDINE HYDROCHLORIDE 0.1 MILLIGRAM(S): 0.3 TABLET ORAL at 17:05

## 2024-12-04 RX ADMIN — Medication 20 MILLIGRAM(S): at 08:33

## 2024-12-04 RX ADMIN — CARVEDILOL 12.5 MILLIGRAM(S): 25 TABLET, FILM COATED ORAL at 17:06

## 2024-12-04 RX ADMIN — LEVETIRACETAM 1000 MILLIGRAM(S): 1000 TABLET ORAL at 17:05

## 2024-12-04 RX ADMIN — HYDRALAZINE HYDROCHLORIDE 100 MILLIGRAM(S): 10 TABLET ORAL at 08:33

## 2024-12-04 NOTE — PROGRESS NOTE ADULT - ASSESSMENT
The patient is a 64 year old male with a past medical history of Afib status post  watchman, no longer on AC,  ESRD on HD MWF, chronic anemia, CAD (s/p CABG), aortic & mitral valve replacement, aortic dissection s/p repair, ischemic bowel s/p resection, seizure disorder (on keppra), IE in 2023 who  presented c/o SOB/ZUNIGA w/ associated productive gray sputum and had 2 episodes of NBNB emesis that occurred after coughing. Patient admitted for acute hypoxia with Afib with RVR in the setting of acute pulmonary edema requiring HD. Hypoxia resolved. SIRS on admission, blood cultures positive for GNR. ID consulted. TTE ordered as well as CT abdomen and pelvis    Assessment/Plan:    1. Acute hypoxic respiratory failure with Afib with RVR  - In the Setting of Acute pulmonary edema Requiring HD  - Hypoxia now resolved, on RA  - HR controlled    2. Gram negative bacteremia  - History of MV and AV replacement  - IE in 2023  - Repeat Blood cultures sent on 12/3   - ID following  - On Meropenem  - UA negative  - CT abdomen and pelvis  - TTE ordered, may need MUSTAPHA    3. Acute on chronic anemia  - Transfused 1 unit PRBC  - Hb/hct stable with no acute s/s of bleeding  - Continue PO ferrous sulfate     4. Afib w  RVR   - Status post Watchman  - Coreg increased to 12.5mg PO BID    5. History of CAD (s/p CABG)    6. HTN / HLD  - c/w carvedilol torsemide, isosorbide, hydralazine, clonidine   - c/w statin therapy   - c/w ASA    7-Seizure disorder  - c/w keppra     VTE ppx: SCDs    Discharge disposition; Home when medically stable

## 2024-12-04 NOTE — CONSULT NOTE ADULT - SUBJECTIVE AND OBJECTIVE BOX
Patient is a 64y old  Male who presents with a chief complaint of AF RVR (01 Dec 2024 19:31)      HPI:  · Chief Complaint: The patient is a 64y Male complaining of shortness of breath.  · HPI Objective Statement: 64/M PMHx A-fib, ESRD, CAD (s/p CABG), aortic & mitral valve replacement, aortic dissection, bowel ischemia with bowel resection Presenting with shortness of breath.  Patient states he had his full dialysis session yesterday  and noticed when he was walking to his car afterwards that he was getting short of breath.  He states symptoms have slightly progressed since yesterday.  Unable to ambulate without becoming short of breath and feeling nauseous.  Has had 1 or 2 episodes of vomiting today.  Denies any chest pain.  No lightheadedness or syncope.  No lower extremity swelling or pain.  Patient states he has cough and congestion but denies any known fever.    Pt seen , feels much better with nasal oxygen  Had full HD in [facility] yesterday   laying flat   Potasium OK  CXR - mild PVC     Recent discharge 11/12 noted     64/M PMHx A-fib, ESRD, CAD (s/p CABG), aortic & mitral valve replacement, aortic dissection, bowel ischemia with bowel resection who initially presented to the ED after missing 2 sessions of dialysi with dyspnea.  Admitted to medicine for hypertensive emergency in the setting of volume overload due to missed dialysis session.  Hospital course complicated by lower GI bleed (bright red blood per rectum x 2 episodes), requiring 1 unit PRBC.  During the hospital stay patient was dialyzed and resumed on his dialysis schedule.  Gastroenterology was consulted for lower GI bleed, colonoscopy was done which showed internal hemorrhoids & some neovascularization at the site of colonic anastomosis.      PAST MEDICAL & SURGICAL HISTORY:  CHF (congestive heart failure)      CVA (cerebral vascular accident)      Seizures  last seizure 10 years ago      HTN (hypertension)      Hyperlipemia      COVID-19  october 2020      Atrial fibrillation      Former smoker      History of cocaine use  remote hx, currently sober      H/O aortic dissection  s/p repair (2010), surgery complicated by bowel ischemia s/p bowel resection and ostomy with reversal      H/O aortic valve insufficiency      Mitral regurgitation      GIB (gastrointestinal bleeding)      CAD (coronary artery disease)  s/p PCI 1998  and CABG x 2 11/2020      Chronic atrial fibrillation      Aneurysm of artery of upper extremity      Anemia of chronic disease      End stage renal disease      Myocardial infarction      COVID-19 vaccine series completed      Port-A-Cath in place      H/O colectomy      Status post double vessel coronary artery bypass  11/2020 Dr Butler      S/P AVR (aortic valve replacement)  (t)AVR 11/2020 Dr Butler      S/P MVR (mitral valve replacement)  (t)MVR 11/2020 Dr Butler      H/O aortic dissection  Type A; emergent repair 2010      AV fistula  LUE      History of renal transplant      Presence of Watchman left atrial appendage closure device          FAMILY HISTORY:  FH: hypertension    Family history of cardiac disorder (Mother)  mother        Social History:    MEDICATIONS  (STANDING):    MEDICATIONS  (PRN):  acetaminophen     Tablet .. 650 milliGRAM(s) Oral every 6 hours PRN Temp greater or equal to 38C (100.4F), Mild Pain (1 - 3)  aluminum hydroxide/magnesium hydroxide/simethicone Suspension 30 milliLiter(s) Oral every 4 hours PRN Dyspepsia  melatonin 3 milliGRAM(s) Oral at bedtime PRN Insomnia  ondansetron Injectable 4 milliGRAM(s) IV Push every 8 hours PRN Nausea and/or Vomiting      Allergies    penicillin (Other)    Intolerances            Vital Signs Last 24 Hrs  T(C): 36.8 (01 Dec 2024 19:14), Max: 37.4 (01 Dec 2024 16:52)  T(F): 98.3 (01 Dec 2024 19:14), Max: 99.4 (01 Dec 2024 16:52)  HR: 114 (01 Dec 2024 19:30) (110 - 118)  BP: 174/94 (01 Dec 2024 19:14) (170/85 - 174/94)  BP(mean): --  RR: 22 (01 Dec 2024 19:14) (18 - 22)  SpO2: 98% (01 Dec 2024 19:30) (90% - 98%)    Parameters below as of 01 Dec 2024 19:30  Patient On (Oxygen Delivery Method): nasal cannula  O2 Flow (L/min): 3    Daily     Daily   I&O's Detail    I&O's Summary      PHYSICAL EXAM:    GENERAL: NAD,  HEAD:  Atraumatic, Normocephalic , no edema   NECK: Supple, No JVD,   NERVOUS SYSTEM:  Alert & Oriented X3,  CHEST/LUNG: EAE , No wheeze   HEART: Regular rate and rhythm; No gallop or rub   ABDOMEN: Soft, Nontender, Nondistended; Bowel sounds present  EXTREMITIES:  no sig edema . L access w/ thrill         LABS:                        8.4    5.24  )-----------( 101      ( 01 Dec 2024 15:56 )             26.7     12-01    140  |  96  |  40.2[H]  ----------------------------<  87  4.1   |  25.0  |  5.54[H]    Ca    8.8      01 Dec 2024 15:56  Mg     2.0     12-01    TPro  6.7  /  Alb  4.0  /  TBili  1.5  /  DBili  x   /  AST  11  /  ALT  9   /  AlkPhos  97  12-01      Urinalysis Basic - ( 01 Dec 2024 15:56 )    Color: x / Appearance: x / SG: x / pH: x  Gluc: 87 mg/dL / Ketone: x  / Bili: x / Urobili: x   Blood: x / Protein: x / Nitrite: x   Leuk Esterase: x / RBC: x / WBC x   Sq Epi: x / Non Sq Epi: x / Bacteria: x      Magnesium: 2.0 mg/dL (12-01 @ 15:56)        < from: Xray Chest 1 View- PORTABLE-Urgent (12.01.24 @ 15:50) >    ACC: [id number] EXAM:  XR CHEST PORTABLE URGENT 1V   ORDERED BY: FLOYD ROWE     PROCEDURE DATE:  12/01/2024          INTERPRETATION:  Portable AP chest radiograph    COMPARISON: 8/22/2024 chest.    CLINICAL INFORMATION: Chest Pain.    FINDINGS:  CATHETERS AND TUBES: None    PULMONARY:  Diffuse pulmonary vascular congestion without large airspace   consolidations or pleural effusions...  No pneumothorax.    HEART/VASCULAR: The  heart is mildly enlarged in transverse diameter. No   hilar mass. Status post cardiac valve replacement..    BONES: The visualized osseous thorax is intact.    IMPRESSION: Mild cardiomegaly.  Diffuse pulmonary vascular congestion without large airspace   consolidations or pleural effusions    --- End of Report ---            NINA JERONIMO MD; Attending Radiologist  This document has been electronically signed. Dec  1 2024  4:17PM    < end of copied text >    < from: CT Angio Chest PE Protocol w/ IV Cont (11.05.24 @ 02:49) >    ACC: [id number] EXAM:  CT ANGIO CHEST PULM [facility]   ORDERED BY: FLOYD FONSECA     PROCEDURE DATE:  11/05/2024          INTERPRETATION:  CLINICAL INFORMATION: Shortness of breath, missed   dialysis.    COMPARISON: 5/22/2023 chest CT, 10/4/2023 abdominal CT    CONTRAST/COMPLICATIONS:  IV Contrast: Omnipaque 350  51 cc administered   49 cc discarded  Oral Contrast: NONE  Complications: None reported at time of study completion    PROCEDURE:  CT Angiography of the Chest.  Sagittal and coronal reformats were performed as well as 3D (MIP)   reconstructions.    FINDINGS:    LUNGS AND LARGE AIRWAYS: Patent central airways. Bilateral patchy   groundglass haziness. Mild bibasilar atelectasis.  PLEURA: No pleural effusion.  VESSELS: No pulmonary embolism. Status post aortic root repair, mitral   valve repair and left atrial appendage closure device. Aortic and   coronary artery atherosclerotic calcifications. Chronic aortic dissection   again noted extending from the aortic arch through the partially   visualized abdominal aorta. Increased size of the descending thoracic   aorta up to 4.1 cm. Stable dilatation of the ascending aorta. Only the   true lumen is filling at the time of this exam.  HEART: Heart size is normal. No pericardial effusion.  MEDIASTINUM AND KAYLA: Enlarged mediastinal nodes increased from prior and   new right hilar nodes.  CHEST WALL AND LOWER NECK: Right chest wall catheter with tip in the SVC.  VISUALIZED UPPER ABDOMEN: Cholelithiasis, distended gallbladder. Renal   cysts. Known right renal lesion not well evaluated on this arterial phase   exam. Bowel anastomosis in the upper abdomen.  BONES: Degenerative changes. Median sternotomy.    IMPRESSION:  No pulmonary embolism.    Chronic aortic dissection, discussed in further detail on same day   dissection CT.    Bilateral patchy ground glass haziness is nonspecific and could reflect   edema or infectious/inflammatory process.    Indeterminate enlarged mediastinal and right hilar nodes, increased/new   since prior exam. Recommend clinical correlation and follow-up chest CT   and/or PET CT as warranted.          --- End of Report ---            DEEPAK CROCKER MD; Attending Radiologist  This document has been electronically signed. Nov 5 2024  3:37AM    < end of copied text >  RADIOLOGY & ADDITIONAL TESTS:  
INFECTIOUS DISEASES AND INTERNAL MEDICINE at [place]  =======================================================  Johnny Catalan MD  Diplomates American Board of Internal Medicine and Infectious Diseases  Telephone [phone]  Fax            [phone]  =======================================================    JONATHAN [id number]      HPI:  65 y/o M w/ PMH Afib (s/p watchman, no longer on AC), ESRD (HD m/w/f; follows dr REGAN  , chronic anemia, CAD (s/p CABG), aortic & mitral valve replacement, aortic dissection s/p repair, ischemic bowel s/p resection, seizure disorder (on keppra) presented c/o SOB that started after HD yesterday.  Pt states sob is exertional.  He also reports productive cough that is thick and gray for the past few days.  He also had 2 episodes of NBNB emesis that occurred after coughing.  He denies LE edema and still makes urine.  He denies fevers, chills, sick contacts that he knows of and no recent travel.  HE HAD A HX MV ENDOCARDITIS WITH STAPH EPI IN OCT 2023 WAS TREATED WITH CEFAZOLIN REPEAT MUSTAPHA SHOWED NEG VEG IN DEC 2023  BLOOD CX HERE GM NEG RODS   ASKED TO EVALUATE FROM ID STANDPOINT       PAST MEDICAL & SURGICAL HISTORY:  CHF (congestive heart failure)      CVA (cerebral vascular accident)      Seizures  last seizure 10 years ago      HTN (hypertension)      Hyperlipemia      COVID-19  october 2020      Atrial fibrillation      Former smoker      History of cocaine use  remote hx, currently sober      H/O aortic dissection  s/p repair (2010), surgery complicated by bowel ischemia s/p bowel resection and ostomy with reversal      H/O aortic valve insufficiency      Mitral regurgitation      GIB (gastrointestinal bleeding)      CAD (coronary artery disease)  s/p PCI 1998  and CABG x 2 11/2020      Chronic atrial fibrillation      Aneurysm of artery of upper extremity      Anemia of chronic disease      End stage renal disease      Myocardial infarction      COVID-19 vaccine series completed      Port-A-Cath in place      H/O colectomy      Status post double vessel coronary artery bypass  11/2020 Dr Butler      S/P AVR (aortic valve replacement)  (t)AVR 11/2020 Dr Butler      S/P MVR (mitral valve replacement)  (t)MVR 11/2020 Dr Butler      H/O aortic dissection  Type A; emergent repair 2010      AV fistula  LUE      History of renal transplant      Presence of Watchman left atrial appendage closure device          ANTIBIOTICS  meropenem Injectable 500 milliGRAM(s) IV Push every 24 hours      Allergies    penicillin (Other)    Intolerances        SOCIAL HISTORY:     FAMILY HX   FAMILY HISTORY:  FH: hypertension    Family history of cardiac disorder (Mother)  mother        Vital Signs Last 24 Hrs  T(C): 36.4 (04 Dec 2024 05:01), Max: 36.8 (03 Dec 2024 15:31)  T(F): 97.6 (04 Dec 2024 05:01), Max: 98.3 (03 Dec 2024 15:31)  HR: 108 (04 Dec 2024 05:01) (98 - 121)  BP: 135/71 (04 Dec 2024 05:01) (117/75 - 147/84)  BP(mean): --  RR: 18 (04 Dec 2024 05:01) (16 - 19)  SpO2: 97% (04 Dec 2024 05:01) (90% - 99%)    Parameters below as of 04 Dec 2024 05:01  Patient On (Oxygen Delivery Method): room air      Drug Dosing Weight  Height (cm): 172.7 (19 Sep 2024 09:18)  Weight (kg): 70.3 (01 Dec 2024 14:45)  BMI (kg/m2): 23.6 (01 Dec 2024 14:45)  BSA (m2): 1.83 (01 Dec 2024 14:45)      REVIEW OF SYSTEMS:    CONSTITUTIONAL:  As per HPI.    HEENT:  Eyes:  No diplopia or blurred vision. ENT:  No earache, sore throat or runny nose.    CARDIOVASCULAR:  No pressure, squeezing, strangling, tightness, heaviness or aching about the chest, neck, axilla or epigastrium.    RESPIRATORY:   cough, shortness of breath,     GASTROINTESTINAL:  No nausea, vomiting or diarrhea.    GENITOURINARY:  No dysuria, frequency or urgency.    MUSCULOSKELETAL:  As per HPI.    SKIN:  No change in skin, hair or nails.    NEUROLOGIC:  No paresthesias, fasciculations, seizures or weakness.                  PHYSICAL EXAMINATION:    GENERAL: The patient is a _____in no apparent distress. ___     VITAL SIGNS: T(C): 36.4 (12-04-24 @ 05:01), Max: 36.8 (12-03-24 @ 15:31)  HR: 108 (12-04-24 @ 05:01) (98 - 121)  BP: 135/71 (12-04-24 @ 05:01) (117/75 - 147/84)  RR: 18 (12-04-24 @ 05:01) (16 - 19)  SpO2: 97% (12-04-24 @ 05:01) (90% - 99%)  Wt(kg): --    HEENT: Head is normocephalic and atraumatic.  ANICTERIC  NECK: Supple. No carotid bruits.  No lymphadenopathy or thyromegaly.    LUNGS:COARSE BREATH SOUNDS    HEART: Regular rate and rhythm without murmur.    ABDOMEN: Soft, nontender, and nondistended.  Positive bowel sounds.  No hepatosplenomegaly was noted. NO REBOUND NO GUARDING    EXTREMITIES: LUE  FISTULA APPEARS WNL     NEUROLOGIC: NON FOCAL      SKIN: No ulceration or induration present. NO RASH        BLOOD CULTURES  Culture Results:   Growth in anaerobic bottle: Gram Negative Rods  Direct identification is available within approximately 3-5  hours either by Blood Panel Multiplexed PCR or Direct  MALDI-TOF. Details: [email or web address] (12-02 @ 00:05)       URINE CX          LABS:                        7.1    4.80  )-----------( 81       ( 03 Dec 2024 07:45 )             23.2     12-03    139  |  99  |  55.4[H]  ----------------------------<  97  4.0   |  26.0  |  7.63[H]    Ca    7.7[L]      03 Dec 2024 07:45        Urinalysis Basic - ( 03 Dec 2024 07:45 )    Color: x / Appearance: x / SG: x / pH: x  Gluc: 97 mg/dL / Ketone: x  / Bili: x / Urobili: x   Blood: x / Protein: x / Nitrite: x   Leuk Esterase: x / RBC: x / WBC x   Sq Epi: x / Non Sq Epi: x / Bacteria: x        RADIOLOGY & ADDITIONAL STUDIES:      ASSESSMENT/PLAN  65 y/o M w/ PMH Afib (s/p watchman, no longer on AC), ESRD (HD m/w/f; follows dr REGAN  , chronic anemia, CAD (s/p CABG), aortic & mitral valve replacement, aortic dissection s/p repair, ischemic bowel s/p resection, seizure disorder (on keppra) presented c/o SOB that started after HD yesterday.  Pt states sob is exertional.  He also reports productive cough that is thick and gray for the past few days.  He also had 2 episodes of NBNB emesis that occurred after coughing.  He denies LE edema and still makes urine.  He denies fevers, chills, sick contacts that he knows of and no recent travel.  HE HAD A HX MV ENDOCARDITIS WITH STAPH EPI IN OCT 2023 WAS TREATED WITH CEFAZOLIN REPEAT MUSTAPHA SHOWED NEG VEG IN DEC 2023  BLOOD CX HERE GM NEG RODS   PT NON TOXIC  ? SOURCE    ALTHOUGH LESS COMMON WITH GM  NEG RODS WOULD   REPEAT BLOOD CX POSITIVE X 2 SETS   HX OF ENDOCARDITIS IN PAST MAY NEED MUSTAPHA  PT WANTS TO LEAVE AMA TOLD HIM HE HAD BLOOD INFECTION AGREES TO STAY   WOULD CONSIDER CT ABD PLEVIS TO LOOK FOR SOURCE OF GM NEG MADELINE  UNLESS URINE CX IS POSTIVE  WOULD SEND URINE CX  WILL FOLLOWUP WITH FURTHER RECOMMENDATIONS             ILDA LOREDO MD

## 2024-12-04 NOTE — PROGRESS NOTE ADULT - SUBJECTIVE AND OBJECTIVE BOX
Patient seen and examined    I&O's Summary    03 Dec 2024 07:01  -  04 Dec 2024 07:00  --------------------------------------------------------  IN: 0 mL / OUT: 1400 mL / NET: -1400 mL        REVIEW OF SYSTEMS:    CONSTITUTIONAL: No F/C  RESPIRATORY: No cough,  No SOB  CARDIOVASCULAR: No CP/palpitations,    GASTROINTESTINAL: No abdominal pain  or NVD   GENITOURINARY: No UTI sx  NEUROLOGICAL: No headaches, NO wk/numbness  MUSCULOSKELETAL:   EXTREMITIES : no swelling,    Vital Signs Last 24 Hrs  T(C): 36.5 (04 Dec 2024 12:16), Max: 36.8 (03 Dec 2024 15:31)  T(F): 97.7 (04 Dec 2024 12:16), Max: 98.3 (03 Dec 2024 15:31)  HR: 93 (04 Dec 2024 12:16) (93 - 121)  BP: 153/70 (04 Dec 2024 12:16) (117/75 - 166/92)  BP(mean): --  RR: 17 (04 Dec 2024 12:16) (16 - 19)  SpO2: 95% (04 Dec 2024 12:16) (90% - 98%)    Parameters below as of 04 Dec 2024 12:16  Patient On (Oxygen Delivery Method): room air        PHYSICAL EXAM:    GENERAL: NAD,   EYES:  conjunctiva and sclera clear  NECK: Supple, No JVD/Bruit  NERVOUS SYSTEM:  A/O x3,   CHEST:  No rales or rhonchi  HEART:  RRR, No murmur  ABDOMEN: Soft, NT/ND BS+  EXTREMITIES:  No Edema; AVF L arm  SKIN: No rashes    LABS:                          7.1    4.80  )-----------( 81       ( 03 Dec 2024 07:45 )             23.2     12-03    139  |  99  |  55.4[H]  ----------------------------<  97  4.0   |  26.0  |  7.63[H]    Ca    7.7[L]      03 Dec 2024 07:45        MEDICATIONS  (STANDING):  acetaminophen     Tablet .. PRN  ALPRAZolam PRN  aluminum hydroxide/magnesium hydroxide/simethicone Suspension PRN  aspirin enteric coated  atorvastatin  carvedilol  chlorhexidine 2% Cloths  cloNIDine  epoetin yolanda-epbx (RETACRIT) Injectable  hydrALAZINE  isosorbide   mononitrate ER Tablet (IMDUR)  levETIRAcetam  melatonin PRN  meropenem Injectable  methocarbamol PRN  multivitamin  ondansetron Injectable PRN  pantoprazole    Tablet  sevelamer carbonate  tamsulosin  torsemide

## 2024-12-04 NOTE — PROGRESS NOTE ADULT - ASSESSMENT
ESRD - Some PVC on CXR  Had full HD Sat in [facility]  s/p HD yesterday - easily jerry; HD am    GNR bacteremia - source unclear ; ID noted, on Merrem  MUSTAPHA with past IE hx or CTA as per ID    Anemia - Recent colonoscopy few weeks ago   Hgb noted   Will provide Retacrit with HD   Transfusion with HD but Hb still 7.1   Tsat 8 - add venofer    Afib with RVR    HTN - Resumed meds     RO - Renvela resumed     Will follow

## 2024-12-04 NOTE — PROGRESS NOTE ADULT - SUBJECTIVE AND OBJECTIVE BOX
CC: Follow up     INTERVAL HPI/OVERNIGHT EVENTS: Patient seen and examined, afebrile overnight. States he was very anxious overnight unable to sleep. Hypoxia resolved, off O2 this morning       Vital Signs Last 24 Hrs  T(C): 36.3 (04 Dec 2024 08:26), Max: 36.8 (03 Dec 2024 15:31)  T(F): 97.3 (04 Dec 2024 08:26), Max: 98.3 (03 Dec 2024 15:31)  HR: 98 (04 Dec 2024 08:26) (98 - 121)  BP: 166/92 (04 Dec 2024 08:26) (117/75 - 166/92)  BP(mean): --  RR: 17 (04 Dec 2024 08:26) (16 - 19)  SpO2: 94% (04 Dec 2024 08:26) (90% - 99%)    Parameters below as of 04 Dec 2024 08:26  Patient On (Oxygen Delivery Method): room air        PHYSICAL EXAM:    GENERAL: NAD, AOX3  HEAD:  Atraumatic, Normocephalic  EYES:conjunctiva and sclera clear  ENMT: Moist mucous membranes  CHEST/LUNG: Clear to auscultation bilaterally; No rales, rhonchi, wheezing, or rubs  HEART: Regular rate and rhythm; No murmurs, rubs, or gallops  ABDOMEN: Soft, Nontender, Nondistended; Bowel sounds present  EXTREMITIES:  2+ Peripheral Pulses, No clubbing, cyanosis, or edema  LUE AVG        MEDICATIONS  (STANDING):  aspirin enteric coated 81 milliGRAM(s) Oral daily  atorvastatin 40 milliGRAM(s) Oral at bedtime  carvedilol 12.5 milliGRAM(s) Oral every 12 hours  chlorhexidine 2% Cloths 1 Application(s) Topical <User Schedule>  cloNIDine 0.1 milliGRAM(s) Oral two times a day  epoetin yolanda-epbx (RETACRIT) Injectable 04154 Unit(s) IV Push <User Schedule>  hydrALAZINE 100 milliGRAM(s) Oral three times a day  isosorbide   mononitrate ER Tablet (IMDUR) 60 milliGRAM(s) Oral daily  levETIRAcetam 1000 milliGRAM(s) Oral two times a day  meropenem Injectable 500 milliGRAM(s) IV Push every 24 hours  multivitamin 1 Tablet(s) Oral daily  pantoprazole    Tablet 40 milliGRAM(s) Oral before breakfast  sevelamer carbonate 800 milliGRAM(s) Oral three times a day with meals  tamsulosin 0.4 milliGRAM(s) Oral at bedtime  torsemide 20 milliGRAM(s) Oral daily    MEDICATIONS  (PRN):  acetaminophen     Tablet .. 650 milliGRAM(s) Oral every 6 hours PRN Temp greater or equal to 38C (100.4F), Mild Pain (1 - 3)  aluminum hydroxide/magnesium hydroxide/simethicone Suspension 30 milliLiter(s) Oral every 4 hours PRN Dyspepsia  melatonin 3 milliGRAM(s) Oral at bedtime PRN Insomnia  methocarbamol 500 milliGRAM(s) Oral three times a day PRN Muscle Spasm  ondansetron Injectable 4 milliGRAM(s) IV Push every 8 hours PRN Nausea and/or Vomiting      Allergies    penicillin (Other)    Intolerances          LABS:                          7.1    4.80  )-----------( 81       ( 03 Dec 2024 07:45 )             23.2     12-03    139  |  99  |  55.4[H]  ----------------------------<  97  4.0   |  26.0  |  7.63[H]    Ca    7.7[L]      03 Dec 2024 07:45        Urinalysis Basic - ( 03 Dec 2024 07:45 )    Color: x / Appearance: x / SG: x / pH: x  Gluc: 97 mg/dL / Ketone: x  / Bili: x / Urobili: x   Blood: x / Protein: x / Nitrite: x   Leuk Esterase: x / RBC: x / WBC x   Sq Epi: x / Non Sq Epi: x / Bacteria: x        RADIOLOGY & ADDITIONAL TESTS:

## 2024-12-05 LAB
ALBUMIN SERPL ELPH-MCNC: 3.5 G/DL — SIGNIFICANT CHANGE UP (ref 3.3–5.2)
ALP SERPL-CCNC: 91 U/L — SIGNIFICANT CHANGE UP (ref 40–120)
ALT FLD-CCNC: 6 U/L — SIGNIFICANT CHANGE UP
ANION GAP SERPL CALC-SCNC: 12 MMOL/L — SIGNIFICANT CHANGE UP (ref 5–17)
AST SERPL-CCNC: 8 U/L — SIGNIFICANT CHANGE UP
BILIRUB SERPL-MCNC: 1 MG/DL — SIGNIFICANT CHANGE UP (ref 0.4–2)
BUN SERPL-MCNC: 39.8 MG/DL — HIGH (ref 8–20)
CALCIUM SERPL-MCNC: 8.2 MG/DL — LOW (ref 8.4–10.5)
CHLORIDE SERPL-SCNC: 102 MMOL/L — SIGNIFICANT CHANGE UP (ref 96–108)
CO2 SERPL-SCNC: 25 MMOL/L — SIGNIFICANT CHANGE UP (ref 22–29)
CREAT SERPL-MCNC: 6.29 MG/DL — HIGH (ref 0.5–1.3)
EGFR: 9 ML/MIN/1.73M2 — LOW
GLUCOSE SERPL-MCNC: 102 MG/DL — HIGH (ref 70–99)
HCT VFR BLD CALC: 25.4 % — LOW (ref 39–50)
HGB BLD-MCNC: 8.1 G/DL — LOW (ref 13–17)
MCHC RBC-ENTMCNC: 31 PG — SIGNIFICANT CHANGE UP (ref 27–34)
MCHC RBC-ENTMCNC: 31.9 G/DL — LOW (ref 32–36)
MCV RBC AUTO: 97.3 FL — SIGNIFICANT CHANGE UP (ref 80–100)
PLATELET # BLD AUTO: 91 K/UL — LOW (ref 150–400)
POTASSIUM SERPL-MCNC: 4 MMOL/L — SIGNIFICANT CHANGE UP (ref 3.5–5.3)
POTASSIUM SERPL-SCNC: 4 MMOL/L — SIGNIFICANT CHANGE UP (ref 3.5–5.3)
PROT SERPL-MCNC: 5.9 G/DL — LOW (ref 6.6–8.7)
RBC # BLD: 2.61 M/UL — LOW (ref 4.2–5.8)
RBC # FLD: 17.1 % — HIGH (ref 10.3–14.5)
SODIUM SERPL-SCNC: 139 MMOL/L — SIGNIFICANT CHANGE UP (ref 135–145)
WBC # BLD: 5.04 K/UL — SIGNIFICANT CHANGE UP (ref 3.8–10.5)
WBC # FLD AUTO: 5.04 K/UL — SIGNIFICANT CHANGE UP (ref 3.8–10.5)

## 2024-12-05 PROCEDURE — 99233 SBSQ HOSP IP/OBS HIGH 50: CPT

## 2024-12-05 RX ORDER — ALPRAZOLAM 0.5 MG
0.5 TABLET ORAL ONCE
Refills: 0 | Status: DISCONTINUED | OUTPATIENT
Start: 2024-12-05 | End: 2024-12-05

## 2024-12-05 RX ORDER — DIAZEPAM 10 MG/1
5 TABLET ORAL ONCE
Refills: 0 | Status: DISCONTINUED | OUTPATIENT
Start: 2024-12-05 | End: 2024-12-05

## 2024-12-05 RX ORDER — ALPRAZOLAM 0.5 MG
0.25 TABLET ORAL ONCE
Refills: 0 | Status: DISCONTINUED | OUTPATIENT
Start: 2024-12-05 | End: 2024-12-05

## 2024-12-05 RX ADMIN — Medication 81 MILLIGRAM(S): at 12:58

## 2024-12-05 RX ADMIN — Medication 1 TABLET(S): at 12:58

## 2024-12-05 RX ADMIN — ACETAMINOPHEN, DIPHENHYDRAMINE HCL, PHENYLEPHRINE HCL 3 MILLIGRAM(S): 325; 25; 5 TABLET ORAL at 02:54

## 2024-12-05 RX ADMIN — MEROPENEM 500 MILLIGRAM(S): 500 INJECTION, POWDER, FOR SOLUTION INTRAVENOUS at 17:12

## 2024-12-05 RX ADMIN — LEVETIRACETAM 1000 MILLIGRAM(S): 1000 TABLET ORAL at 17:11

## 2024-12-05 RX ADMIN — TAMSULOSIN HYDROCHLORIDE 0.4 MILLIGRAM(S): 0.4 CAPSULE ORAL at 21:49

## 2024-12-05 RX ADMIN — HYDRALAZINE HYDROCHLORIDE 100 MILLIGRAM(S): 10 TABLET ORAL at 12:58

## 2024-12-05 RX ADMIN — DIAZEPAM 5 MILLIGRAM(S): 10 TABLET ORAL at 23:36

## 2024-12-05 RX ADMIN — LEVETIRACETAM 1000 MILLIGRAM(S): 1000 TABLET ORAL at 05:11

## 2024-12-05 RX ADMIN — CHLORHEXIDINE GLUCONATE 1 APPLICATION(S): 1.2 RINSE ORAL at 05:13

## 2024-12-05 RX ADMIN — CLONIDINE HYDROCHLORIDE 0.1 MILLIGRAM(S): 0.3 TABLET ORAL at 05:12

## 2024-12-05 RX ADMIN — HYDRALAZINE HYDROCHLORIDE 100 MILLIGRAM(S): 10 TABLET ORAL at 21:49

## 2024-12-05 RX ADMIN — Medication 20 MILLIGRAM(S): at 05:11

## 2024-12-05 RX ADMIN — Medication 0.25 MILLIGRAM(S): at 14:56

## 2024-12-05 RX ADMIN — Medication 60 MILLIGRAM(S): at 12:58

## 2024-12-05 RX ADMIN — Medication 0.5 MILLIGRAM(S): at 21:49

## 2024-12-05 RX ADMIN — CARVEDILOL 12.5 MILLIGRAM(S): 25 TABLET, FILM COATED ORAL at 05:12

## 2024-12-05 RX ADMIN — CLONIDINE HYDROCHLORIDE 0.1 MILLIGRAM(S): 0.3 TABLET ORAL at 17:11

## 2024-12-05 RX ADMIN — CARVEDILOL 12.5 MILLIGRAM(S): 25 TABLET, FILM COATED ORAL at 17:11

## 2024-12-05 RX ADMIN — Medication 40 MILLIGRAM(S): at 21:49

## 2024-12-05 RX ADMIN — ERYTHROPOIETIN 10000 UNIT(S): 3000 INJECTION, SOLUTION INTRAVENOUS; SUBCUTANEOUS at 10:52

## 2024-12-05 RX ADMIN — Medication 0.5 MILLIGRAM(S): at 09:00

## 2024-12-05 RX ADMIN — HYDRALAZINE HYDROCHLORIDE 100 MILLIGRAM(S): 10 TABLET ORAL at 05:12

## 2024-12-05 RX ADMIN — PANTOPRAZOLE SODIUM 40 MILLIGRAM(S): 40 TABLET, DELAYED RELEASE ORAL at 05:13

## 2024-12-05 NOTE — PROGRESS NOTE ADULT - ASSESSMENT
ESRD - Some PVC on CXR  Had full HD Sat in [facility]  s/p HD yesterday - easily jerry; HD underway but wants to come off after 2 hours    GNR bacteremia - source unclear ; ID noted, on Merrem  MUSTAPHA with past IE hx or CTA as per ID    Anemia - Recent colonoscopy few weeks ago   Hgb noted 8.1 - better despite some bleeding  Will provide Retacrit with HD   Transfusion with HD but Hb still 7.1   Tsat 8 - add venofer    Afib with RVR    HTN - Resumed meds     RO - Renvela resumed     Will follow

## 2024-12-05 NOTE — PROGRESS NOTE ADULT - SUBJECTIVE AND OBJECTIVE BOX
CC: Follow up     INTERVAL HPI/OVERNIGHT EVENTS: Patient seen and examined, afebrile overnight. Unable to sleep due to anxiety       Vital Signs Last 24 Hrs  T(C): 36.4 (05 Dec 2024 11:34), Max: 36.7 (04 Dec 2024 16:13)  T(F): 97.6 (05 Dec 2024 11:34), Max: 98.1 (04 Dec 2024 16:13)  HR: 111 (05 Dec 2024 11:34) (90 - 111)  BP: 162/80 (05 Dec 2024 11:34) (128/68 - 162/80)  BP(mean): --  RR: 18 (05 Dec 2024 11:34) (18 - 18)  SpO2: 100% (05 Dec 2024 11:34) (93% - 100%)    Parameters below as of 05 Dec 2024 11:34  Patient On (Oxygen Delivery Method): room air        PHYSICAL EXAM:    GENERAL: NAD, AOX3  HEAD:  Atraumatic, Normocephalic  EYES: EOMI, PERRLA, conjunctiva and sclera clear  CHEST/LUNG: Clear to auscultation bilaterally; No rales, rhonchi, wheezing, or rubs  HEART: Regular rate and rhythm; No murmurs, rubs, or gallops  ABDOMEN: Soft, Nontender, Nondistended; Bowel sounds present  EXTREMITIES:  2+ Peripheral Pulses, No clubbing, cyanosis, or edema  LUE AVG        MEDICATIONS  (STANDING):  ALPRAZolam 0.25 milliGRAM(s) Oral once  aspirin enteric coated 81 milliGRAM(s) Oral daily  atorvastatin 40 milliGRAM(s) Oral at bedtime  carvedilol 12.5 milliGRAM(s) Oral every 12 hours  chlorhexidine 2% Cloths 1 Application(s) Topical <User Schedule>  cloNIDine 0.1 milliGRAM(s) Oral two times a day  epoetin yolanda-epbx (RETACRIT) Injectable 85080 Unit(s) IV Push <User Schedule>  hydrALAZINE 100 milliGRAM(s) Oral three times a day  isosorbide   mononitrate ER Tablet (IMDUR) 60 milliGRAM(s) Oral daily  levETIRAcetam 1000 milliGRAM(s) Oral two times a day  meropenem Injectable 500 milliGRAM(s) IV Push every 24 hours  multivitamin 1 Tablet(s) Oral daily  pantoprazole    Tablet 40 milliGRAM(s) Oral before breakfast  sevelamer carbonate 800 milliGRAM(s) Oral three times a day with meals  tamsulosin 0.4 milliGRAM(s) Oral at bedtime  torsemide 20 milliGRAM(s) Oral daily    MEDICATIONS  (PRN):  acetaminophen     Tablet .. 650 milliGRAM(s) Oral every 6 hours PRN Temp greater or equal to 38C (100.4F), Mild Pain (1 - 3)  ALPRAZolam 0.5 milliGRAM(s) Oral daily PRN anxiety  aluminum hydroxide/magnesium hydroxide/simethicone Suspension 30 milliLiter(s) Oral every 4 hours PRN Dyspepsia  melatonin 3 milliGRAM(s) Oral at bedtime PRN Insomnia  methocarbamol 500 milliGRAM(s) Oral three times a day PRN Muscle Spasm  ondansetron Injectable 4 milliGRAM(s) IV Push every 8 hours PRN Nausea and/or Vomiting      Allergies    penicillin (Other)    Intolerances          LABS:                          8.1    5.04  )-----------( 91       ( 05 Dec 2024 09:05 )             25.4     12-05    139  |  102  |  39.8[H]  ----------------------------<  102[H]  4.0   |  25.0  |  6.29[H]    Ca    8.2[L]      05 Dec 2024 09:05    TPro  5.9[L]  /  Alb  3.5  /  TBili  1.0  /  DBili  x   /  AST  8   /  ALT  6   /  AlkPhos  91  12-05      Urinalysis Basic - ( 05 Dec 2024 09:05 )    Color: x / Appearance: x / SG: x / pH: x  Gluc: 102 mg/dL / Ketone: x  / Bili: x / Urobili: x   Blood: x / Protein: x / Nitrite: x   Leuk Esterase: x / RBC: x / WBC x   Sq Epi: x / Non Sq Epi: x / Bacteria: x        RADIOLOGY & ADDITIONAL TESTS:

## 2024-12-05 NOTE — PROGRESS NOTE ADULT - ASSESSMENT
65 y/o M w/ PMH Afib (s/p watchman, no longer on AC), ESRD (HD m/w/f; follows dr REGAN  , chronic anemia, CAD (s/p CABG), aortic & mitral valve replacement, aortic dissection s/p repair, ischemic bowel s/p resection, seizure disorder (on keppra) presented c/o SOB that started after HD yesterday.  Pt states sob is exertional.  He also reports productive cough that is thick and gray for the past few days.  He also had 2 episodes of NBNB emesis that occurred after coughing.  He denies LE edema and still makes urine.  He denies fevers, chills, sick contacts that he knows of and no recent travel.  HE HAD A HX MV ENDOCARDITIS WITH STAPH EPI IN OCT 2023 WAS TREATED WITH CEFAZOLIN REPEAT MUSTAPHA SHOWED NEG VEG IN DEC 2023  BLOOD CX HERE GM NEG RODS   PT NON TOXIC  ? SOURCE     REPEAT BLOOD CX POSITIVE X 2 SETS SO FAR NEGATIVE    WOULD CONSIDER CT ABD PLEVIS NO OBVIOSU SOURCE OF BACTEROIDES  HAD COLONOSCOPY LAST ADMISSION   PT WITH NO GI SX  ADMITTED WITH SOB  ? CONTAMINANT HOWEVER PT  IS ESRD AND OTHER OCMORBIDITIES  WOULD TREAT FOR 2 WEEKS  WILL RECOMMEND VANTIN AND FLAGYL FOR TOTAL 2 WEEKS  D/W HOSPITALIST  IF DEVELOPS ABD SX THEN WOULD CONSIDER  REPEAT  SCOPE TO EVALUATE GI TRACT   WILL FOLLOWUP

## 2024-12-05 NOTE — PROGRESS NOTE ADULT - ASSESSMENT
The patient is a 64 year old male with a past medical history of Afib status post  watchman, no longer on AC,  ESRD on HD MWF, chronic anemia, CAD (s/p CABG), aortic & mitral valve replacement, aortic dissection s/p repair, ischemic bowel s/p resection, seizure disorder (on keppra), IE in 2023 who  presented c/o SOB/ZUNIGA w/ associated productive gray sputum and had 2 episodes of NBNB emesis that occurred after coughing. Patient admitted for acute hypoxia with Afib with RVR in the setting of acute pulmonary edema requiring HD. Hypoxia resolved. SIRS on admission, blood cultures positive for GNR. ID consulted. TTE ordered negative for IE. CT abdomen and pelvis with non specific adenopathy no fluid collection. History of subtotatal colectomy. Recent colonoscopy on 11/11 reviewed. Repeat blood cultures negative x 2. Final blood cultures positive for Bacteroides vulgatas.     Assessment/Plan:    1. Acute hypoxic respiratory failure with Afib with RVR  - In the Setting of Acute pulmonary edema Requiring HD  - Hypoxia now resolved, on RA  - HR controlled    2. Gram negative bacteremia  - History of MV and AV replacement  - IE in 2023  - Initial blood culture positive for Bacteroides vulgatas   - Repeat Blood cultures negative x 2   - ID following  - On Meropenem  - UA negative  - CT abdomen and pelvis negative  - TTE negative for IE     3. Acute on chronic anemia  - Transfused 1 unit PRBC  - Hb/hct stable with no acute s/s of bleeding  - Continue PO ferrous sulfate     4. Afib w  RVR   - Status post Watchman  - Coreg  12.5mg PO BID  - Suspected tachycardia due to anxiety     5. History of CAD (s/p CABG)    6. HTN / HLD  - c/w carvedilol torsemide, isosorbide, hydralazine, clonidine   - c/w statin therapy   - c/w ASA    7-Seizure disorder  - c/w keppra     VTE ppx: SCDs    Discharge disposition; Home when medically stable

## 2024-12-05 NOTE — PROGRESS NOTE ADULT - PROVIDER SPECIALTY LIST ADULT
Hospitalist
Nephrology
Nephrology
Hospitalist
Hospitalist
Infectious Disease
Hospitalist
Nephrology
Nephrology

## 2024-12-05 NOTE — PROGRESS NOTE ADULT - SUBJECTIVE AND OBJECTIVE BOX
INFECTIOUS DISEASES AND INTERNAL MEDICINE at [place]  =======================================================  Johnny Catalan MD  Diplomates American Board of Internal Medicine and Infectious Diseases  Telephone [phone]  Fax            [phone]  =======================================================    JONATHAN GIBSON [id number]    Follow up:  BACTEROIDES BACTEREMIA    Allergies:  penicillin (Other)      Medications:  acetaminophen     Tablet .. 650 milliGRAM(s) Oral every 6 hours PRN  ALPRAZolam 0.25 milliGRAM(s) Oral once  ALPRAZolam 0.5 milliGRAM(s) Oral daily PRN  aluminum hydroxide/magnesium hydroxide/simethicone Suspension 30 milliLiter(s) Oral every 4 hours PRN  aspirin enteric coated 81 milliGRAM(s) Oral daily  atorvastatin 40 milliGRAM(s) Oral at bedtime  carvedilol 12.5 milliGRAM(s) Oral every 12 hours  chlorhexidine 2% Cloths 1 Application(s) Topical <User Schedule>  cloNIDine 0.1 milliGRAM(s) Oral two times a day  epoetin yolanda-epbx (RETACRIT) Injectable 72943 Unit(s) IV Push <User Schedule>  hydrALAZINE 100 milliGRAM(s) Oral three times a day  isosorbide   mononitrate ER Tablet (IMDUR) 60 milliGRAM(s) Oral daily  levETIRAcetam 1000 milliGRAM(s) Oral two times a day  melatonin 3 milliGRAM(s) Oral at bedtime PRN  meropenem Injectable 500 milliGRAM(s) IV Push every 24 hours  methocarbamol 500 milliGRAM(s) Oral three times a day PRN  multivitamin 1 Tablet(s) Oral daily  ondansetron Injectable 4 milliGRAM(s) IV Push every 8 hours PRN  pantoprazole    Tablet 40 milliGRAM(s) Oral before breakfast  sevelamer carbonate 800 milliGRAM(s) Oral three times a day with meals  tamsulosin 0.4 milliGRAM(s) Oral at bedtime  torsemide 20 milliGRAM(s) Oral daily    SOCIAL       FAMILY   FAMILY HISTORY:  FH: hypertension    Family history of cardiac disorder (Mother)  mother      REVIEW OF SYSTEMS:  CONSTITUTIONAL:  No Fever or chills  HEENT:   No diplopia or blurred vision.  No earache, sore throat or runny nose.  CARDIOVASCULAR:  No pressure, squeezing, strangling, tightness, heaviness or aching about the chest, neck, axilla or epigastrium.  RESPIRATORY:  No cough, shortness of breath, PND or orthopnea.  GASTROINTESTINAL:  CHRONICr diarrhea.  GENITOURINARY:  No dysuria, frequency or urgency. No Blood in urine  MUSCULOSKELETAL:   moves all joints  SKIN:  No change in skin, hair or nails.  NEUROLOGIC:  No paresthesias, fasciculations, seizures or weakness.  PSYCHIATRIC:  No disorder of thought or mood.  ENDOCRINE:  No heat or cold intolerance, polyuria or polydipsia.  HEMATOLOGICAL:  No easy bruising or bleeding.            Physical Exam:  ICU Vital Signs Last 24 Hrs  T(C): 36.4 (05 Dec 2024 11:34), Max: 36.7 (04 Dec 2024 16:13)  T(F): 97.6 (05 Dec 2024 11:34), Max: 98.1 (04 Dec 2024 16:13)  HR: 111 (05 Dec 2024 11:34) (90 - 111)  BP: 162/80 (05 Dec 2024 11:34) (128/68 - 162/80)  BP(mean): --  ABP: --  ABP(mean): --  RR: 18 (05 Dec 2024 11:34) (18 - 18)  SpO2: 100% (05 Dec 2024 11:34) (93% - 100%)    O2 Parameters below as of 05 Dec 2024 11:34  Patient On (Oxygen Delivery Method): room air          GEN: NAD,   HEENT: normocephalic and atraumatic. EOMI. KIKO.    NECK: Supple. No carotid bruits.  No lymphadenopathy or thyromegaly.  LUNGS: Clear to auscultation.  HEART: Regular rate and rhythm without murmur.  ABDOMEN: Soft, nontender, and nondistended.  Positive bowel sounds SCAR FROM PAST SURGERY.    : No CVA tenderness  EXTREMITIES:LEFT ARM FISTULA  MSK: no joint swelling  NEUROLOGIC: Cranial nerves II through XII are grossly intact.  PSYCHIATRIC: Appropriate affect .  SKIN: No ulceration or induration present.        Labs:  Vitals:  ============  T(F): 97.6 (05 Dec 2024 11:34), Max: 98.1 (04 Dec 2024 16:13)  HR: 111 (05 Dec 2024 11:34)  BP: 162/80 (05 Dec 2024 11:34)  RR: 18 (05 Dec 2024 11:34)  SpO2: 100% (05 Dec 2024 11:34) (93% - 100%)  temp max in last 48H T(F): , Max: 98.3 (12-03-24 @ 15:31)    =======================================================  Current Antibiotics:  meropenem Injectable 500 milliGRAM(s) IV Push every 24 hours    Other medications:  ALPRAZolam 0.25 milliGRAM(s) Oral once  aspirin enteric coated 81 milliGRAM(s) Oral daily  atorvastatin 40 milliGRAM(s) Oral at bedtime  carvedilol 12.5 milliGRAM(s) Oral every 12 hours  chlorhexidine 2% Cloths 1 Application(s) Topical <User Schedule>  cloNIDine 0.1 milliGRAM(s) Oral two times a day  epoetin yolanda-epbx (RETACRIT) Injectable 58081 Unit(s) IV Push <User Schedule>  hydrALAZINE 100 milliGRAM(s) Oral three times a day  isosorbide   mononitrate ER Tablet (IMDUR) 60 milliGRAM(s) Oral daily  levETIRAcetam 1000 milliGRAM(s) Oral two times a day  multivitamin 1 Tablet(s) Oral daily  pantoprazole    Tablet 40 milliGRAM(s) Oral before breakfast  sevelamer carbonate 800 milliGRAM(s) Oral three times a day with meals  tamsulosin 0.4 milliGRAM(s) Oral at bedtime  torsemide 20 milliGRAM(s) Oral daily      =======================================================  Labs:                        8.1    5.04  )-----------( 91       ( 05 Dec 2024 09:05 )             25.4     12-05    139  |  102  |  39.8[H]  ----------------------------<  102[H]  4.0   |  25.0  |  6.29[H]    Ca    8.2[L]      05 Dec 2024 09:05    TPro  5.9[L]  /  Alb  3.5  /  TBili  1.0  /  DBili  x   /  AST  8   /  ALT  6   /  AlkPhos  91  12-05      Culture - Blood (collected 12-03-24 @ 17:28)  Source: .Blood BLOOD  Preliminary Report (12-05-24 @ 03:01):    No growth at 24 hours    Culture - Blood (collected 12-03-24 @ 17:25)  Source: .Blood BLOOD  Preliminary Report (12-05-24 @ 03:01):    No growth at 24 hours    Culture - Blood (collected 12-02-24 @ 00:05)  Source: .Blood BLOOD  Gram Stain (12-03-24 @ 14:33):    Growth in anaerobic bottle: Gram Negative Rods  Final Report (12-04-24 @ 11:54):    Growth in anaerobic bottle: Bacteroides vulgatus group    "Susceptibilities not performed"    Direct identification is available within approximately 3-5    hours either by Blood Panel Multiplexed PCR or Direct    MALDI-TOF. Details: [email or web address]  Organism: Blood Culture PCR (12-04-24 @ 11:54)  Organism: Blood Culture PCR (12-04-24 @ 11:54)    Sensitivities:      Method Type: PCR      -  Blood PCR Panel: NEG    Culture - Blood (collected 11-05-24 @ 17:12)  Source: .Blood BLOOD  Final Report (11-10-24 @ 22:00):    No growth at 5 days    Culture - Urine (collected 11-05-24 @ 16:51)  Source: Catheterized Catheterized  Final Report (11-06-24 @ 18:59):    <10,000 CFU/mL Normal Urogenital Sivan    Culture - Surgical Swab (collected 06-20-24 @ 15:00)  Source: .Surgical Swab brachial artery pseudo aneurysm  Final Report (06-26-24 @ 11:39):    No growth at 5 days    Culture - Tissue with Gram Stain (collected 06-20-24 @ 15:00)  Source: .Tissue bovine pericardium patch  Gram Stain (06-21-24 @ 06:16):    No polymorphonuclear cells seen per low power field    No organisms seen per oil power field  Final Report (06-25-24 @ 23:13):    No growth at 5 days    Culture - Abscess with Gram Stain (collected 10-01-23 @ 12:00)  Source: .Abscess arm abscess  Gram Stain (10-03-23 @ 14:05):    Upon re-evaluation of gram stain:    No organisms seen per oil power field    Moderate polymorphonuclear leukocytes seen per low power field    Previously reported as:    Rare Gram positive cocci in pairs seen per oil power field    Rare Gram Negative Rods seen per oil power field  Final Report (10-07-23 @ 08:30):    No growth at 5 days    Culture - Blood (collected 09-27-23 @ 14:47)  Source: .Blood Blood-Peripheral  Final Report (10-02-23 @ 22:00):    No growth at 5 days    Culture - Blood (collected 09-27-23 @ 14:42)  Source: .Blood Blood-Peripheral  Final Report (10-02-23 @ 22:00):    No growth at 5 days    Culture - Stool (collected 09-25-23 @ 06:50)  Source: .Stool Feces  Final Report (09-27-23 @ 09:40):    No enteric pathogens isolated.    (Stool culture examined for Salmonella,    Shigella, Campylobacter, Aeromonas, Plesiomonas,    Vibrio, E.coli O157 and Yersinia)    Culture - Blood (collected 09-25-23 @ 05:50)  Source: .Blood Blood-Peripheral  Gram Stain (09-26-23 @ 14:19):    Growth in aerobic bottle: Gram Positive Cocci in Clusters    Growth in anaerobic bottle: Gram Positive Cocci in Clusters  Final Report (09-29-23 @ 23:24):    Growth in aerobic and anaerobic bottles: Staphylococcus epidermidis  Organism: Staphylococcus epidermidis (09-29-23 @ 23:24)  Organism: Staphylococcus epidermidis (09-29-23 @ 23:24)    Sensitivities:      Method Type: EDMUND      -  Ampicillin/Sulbactam: S <=8/4      -  Cefazolin: S <=4      -  Clindamycin: R >4      -  Erythromycin: S <=0.25      -  Gentamicin: S <=1 Should not be used as monotherapy      -  Oxacillin: S <=0.25      -  Penicillin: R 0.25      -  Rifampin: S <=1 Should not be used as monotherapy      -  Tetracycline: S <=1      -  Trimethoprim/Sulfamethoxazole: S <=0.5/9.5      -  Vancomycin: S 2    Culture - Blood (collected 09-25-23 @ 05:13)  Source: .Blood Blood-Peripheral  Gram Stain (09-26-23 @ 16:33):    Growth in aerobic bottle: Gram Positive Cocci in Clusters  Final Report (09-28-23 @ 10:47):    Growth in aerobic bottle: Staphylococcus epidermidis    "Susceptibilities not performed"    Culture - Urine (collected 09-25-23 @ 01:00)  Source: Clean Catch Clean Catch (Midstream)  Final Report (09-26-23 @ 07:28):    <10,000 CFU/mL Normal Urogenital Sivan    Culture - Blood (collected 09-23-23 @ 19:30)  Source: .Blood Blood-Peripheral  Gram Stain (09-25-23 @ 21:07):    Growth in aerobic bottle: Gram Positive Cocci in Clusters    Growth in anaerobic bottle: Gram Positive Cocci in Clusters  Final Report (09-26-23 @ 17:53):    Growth in aerobic and anaerobic bottles: Staphylococcus epidermidis    "Susceptibilities not performed"    Culture - Blood (collected 09-23-23 @ 19:25)  Source: .Blood Blood-Peripheral  Gram Stain (09-26-23 @ 16:58):    Growth in aerobic bottle: Gram Positive Cocci in Clusters    Growth in anaerobic bottle: Gram Positive Cocci in Clusters  Final Report (09-28-23 @ 18:08):    Growth in aerobic and anaerobic bottles: Staphylococcus epidermidis    Direct identification is available within approximately 3-5    hours either by Blood Panel Multiplexed PCR or Direct    MALDI-TOF. Details: [email or web address]  Organism: Blood Culture PCR  Staphylococcus epidermidis (09-28-23 @ 18:08)  Organism: Staphylococcus epidermidis (09-28-23 @ 18:08)    Sensitivities:      Method Type: EDMUND      -  Ampicillin/Sulbactam: S <=8/4      -  Cefazolin: S <=4      -  Clindamycin: S <=0.25      -  Erythromycin: S <=0.25      -  Gentamicin: S <=1 Should not be used as monotherapy      -  Oxacillin: S <=0.25      -  Penicillin: R 1      -  Rifampin: S <=1 Should not be used as monotherapy      -  Tetracycline: S <=1      -  Trimethoprim/Sulfamethoxazole: S <=0.5/9.5      -  Vancomycin: S 1  Organism: Blood Culture PCR (09-28-23 @ 18:08)    Sensitivities:      Method Type: PCR      -  Staphylococcus epidermidis: Detec    Culture - Urine (collected 05-22-23 @ 20:09)  Source: Clean Catch Clean Catch (Midstream)  Final Report (05-23-23 @ 22:21):    No growth    Culture - Blood (collected 05-22-23 @ 18:35)  Source: .Blood Blood-Peripheral  Final Report (05-28-23 @ 01:00):    No Growth Final    Culture - Blood (collected 05-22-23 @ 18:30)  Source: .Blood Blood-Peripheral  Final Report (05-28-23 @ 01:00):    No Growth Final      Creatinine: 6.29 mg/dL (12-05-24 @ 09:05)  Creatinine: 7.63 mg/dL (12-03-24 @ 07:45)  Creatinine: 6.17 mg/dL (12-02-24 @ 04:38)  Creatinine: 5.54 mg/dL (12-01-24 @ 15:56)    Procalcitonin: 0.30 ng/mL (12-03-24 @ 17:28)      Ferritin: 374 ng/mL (12-03-24 @ 07:45)  Ferritin: 451 ng/mL (12-02-24 @ 04:38)      WBC Count: 5.04 K/uL (12-05-24 @ 09:05)  WBC Count: 4.80 K/uL (12-03-24 @ 07:45)  WBC Count: 4.32 K/uL (12-02-24 @ 04:38)  WBC Count: 5.24 K/uL (12-01-24 @ 15:56)    SARS-CoV-2 Result: NotDetec (12-01-24 @ 15:56)      Alkaline Phosphatase: 91 U/L (12-05-24 @ 09:05)  Alkaline Phosphatase: 81 U/L (12-02-24 @ 04:38)  Alkaline Phosphatase: 97 U/L (12-01-24 @ 15:56)  Alanine Aminotransferase (ALT/SGPT): 6 U/L (12-05-24 @ 09:05)  Alanine Aminotransferase (ALT/SGPT): 7 U/L (12-02-24 @ 04:38)  Alanine Aminotransferase (ALT/SGPT): 9 U/L (12-01-24 @ 15:56)  Aspartate Aminotransferase (AST/SGOT): 8 U/L (12-05-24 @ 09:05)  Aspartate Aminotransferase (AST/SGOT): 6 U/L (12-02-24 @ 04:38)  Aspartate Aminotransferase (AST/SGOT): 11 U/L (12-01-24 @ 15:56)  Bilirubin Total: 1.0 mg/dL (12-05-24 @ 09:05)  Bilirubin Total: 1.3 mg/dL (12-02-24 @ 04:38)  Bilirubin Total: 1.5 mg/dL (12-01-24 @ 15:56)

## 2024-12-05 NOTE — PHARMACOTHERAPY INTERVENTION NOTE - COMMENTS
Modified penicillin allergy history to state that patient tolerated meropenem during this admission.      Tina Martines, PharmD   Clinical Pharmacy Specialist, Infectious Diseases  Tele-Antimicrobial Stewardship Program (Tele-ASP)  Tele-ASP Phone: [phone]

## 2024-12-05 NOTE — PROGRESS NOTE ADULT - SUBJECTIVE AND OBJECTIVE BOX
Patient was seen and evaluated on dialysis.   IV came out during night and bled through, upset, didn't sleep  No c/o CP SOB NV  no F/C  no swelling    T(C): 36.4 (12-05-24 @ 11:34), Max: 36.7 (12-04-24 @ 16:13)  HR: 111 (12-05-24 @ 11:34) (90 - 111)  BP: 162/80 (12-05-24 @ 11:34) (128/68 - 162/80)  Wt(kg): --  PE ;  NAD  lungs - CTA  CV gr 1 murmer,  No gallop or rub  Abd : soft, NT BS +, No masses  Ext- No edema; L arm no local swelling or puncture tenderness  Neuro : Grossly intact, moving extremities                             8.1    5.04  )-----------( 91       ( 05 Dec 2024 09:05 )             25.4        12-05    139  |  102  |  39.8[H]  ----------------------------<  102[H]  4.0   |  25.0  |  6.29[H]    Ca    8.2[L]      05 Dec 2024 09:05    TPro  5.9[L]  /  Alb  3.5  /  TBili  1.0  /  DBili  x   /  AST  8   /  ALT  6   /  AlkPhos  91  12-05      MEDICATIONS  (STANDING):  acetaminophen     Tablet .. PRN  ALPRAZolam PRN  aluminum hydroxide/magnesium hydroxide/simethicone Suspension PRN  aspirin enteric coated  atorvastatin  carvedilol  chlorhexidine 2% Cloths  cloNIDine  epoetin yolanda-epbx (RETACRIT) Injectable  hydrALAZINE  isosorbide   mononitrate ER Tablet (IMDUR)  levETIRAcetam  melatonin PRN  meropenem Injectable  methocarbamol PRN  multivitamin  ondansetron Injectable PRN  pantoprazole    Tablet  sevelamer carbonate  tamsulosin  torsemide      Patient stable  Serg HD easily  Continue

## 2024-12-06 VITALS — WEIGHT: 149.47 LBS

## 2024-12-06 PROCEDURE — 84443 ASSAY THYROID STIM HORMONE: CPT

## 2024-12-06 PROCEDURE — 87040 BLOOD CULTURE FOR BACTERIA: CPT

## 2024-12-06 PROCEDURE — 86870 RBC ANTIBODY IDENTIFICATION: CPT

## 2024-12-06 PROCEDURE — 80048 BASIC METABOLIC PNL TOTAL CA: CPT

## 2024-12-06 PROCEDURE — 87077 CULTURE AEROBIC IDENTIFY: CPT

## 2024-12-06 PROCEDURE — 83540 ASSAY OF IRON: CPT

## 2024-12-06 PROCEDURE — 83880 ASSAY OF NATRIURETIC PEPTIDE: CPT

## 2024-12-06 PROCEDURE — 71045 X-RAY EXAM CHEST 1 VIEW: CPT

## 2024-12-06 PROCEDURE — 36430 TRANSFUSION BLD/BLD COMPNT: CPT

## 2024-12-06 PROCEDURE — 80053 COMPREHEN METABOLIC PANEL: CPT

## 2024-12-06 PROCEDURE — 86880 COOMBS TEST DIRECT: CPT

## 2024-12-06 PROCEDURE — 80061 LIPID PANEL: CPT

## 2024-12-06 PROCEDURE — 84466 ASSAY OF TRANSFERRIN: CPT

## 2024-12-06 PROCEDURE — 74176 CT ABD & PELVIS W/O CONTRAST: CPT | Mod: MC

## 2024-12-06 PROCEDURE — 0241U: CPT

## 2024-12-06 PROCEDURE — 87637 SARSCOV2&INF A&B&RSV AMP PRB: CPT

## 2024-12-06 PROCEDURE — 86922 COMPATIBILITY TEST ANTIGLOB: CPT

## 2024-12-06 PROCEDURE — 82746 ASSAY OF FOLIC ACID SERUM: CPT

## 2024-12-06 PROCEDURE — 99239 HOSP IP/OBS DSCHRG MGMT >30: CPT

## 2024-12-06 PROCEDURE — 85025 COMPLETE CBC W/AUTO DIFF WBC: CPT

## 2024-12-06 PROCEDURE — 82607 VITAMIN B-12: CPT

## 2024-12-06 PROCEDURE — 86901 BLOOD TYPING SEROLOGIC RH(D): CPT

## 2024-12-06 PROCEDURE — 83036 HEMOGLOBIN GLYCOSYLATED A1C: CPT

## 2024-12-06 PROCEDURE — P9016: CPT

## 2024-12-06 PROCEDURE — 86900 BLOOD TYPING SEROLOGIC ABO: CPT

## 2024-12-06 PROCEDURE — 83605 ASSAY OF LACTIC ACID: CPT

## 2024-12-06 PROCEDURE — 86850 RBC ANTIBODY SCREEN: CPT

## 2024-12-06 PROCEDURE — 85027 COMPLETE CBC AUTOMATED: CPT

## 2024-12-06 PROCEDURE — 83550 IRON BINDING TEST: CPT

## 2024-12-06 PROCEDURE — 87641 MR-STAPH DNA AMP PROBE: CPT

## 2024-12-06 PROCEDURE — 87640 STAPH A DNA AMP PROBE: CPT

## 2024-12-06 PROCEDURE — 87150 DNA/RNA AMPLIFIED PROBE: CPT

## 2024-12-06 PROCEDURE — 93005 ELECTROCARDIOGRAM TRACING: CPT

## 2024-12-06 PROCEDURE — 82728 ASSAY OF FERRITIN: CPT

## 2024-12-06 PROCEDURE — 99261: CPT

## 2024-12-06 PROCEDURE — 99285 EMERGENCY DEPT VISIT HI MDM: CPT

## 2024-12-06 PROCEDURE — 84145 PROCALCITONIN (PCT): CPT

## 2024-12-06 PROCEDURE — 84100 ASSAY OF PHOSPHORUS: CPT

## 2024-12-06 PROCEDURE — 81001 URINALYSIS AUTO W/SCOPE: CPT

## 2024-12-06 PROCEDURE — 36415 COLL VENOUS BLD VENIPUNCTURE: CPT

## 2024-12-06 PROCEDURE — 83735 ASSAY OF MAGNESIUM: CPT

## 2024-12-06 PROCEDURE — 93306 TTE W/DOPPLER COMPLETE: CPT

## 2024-12-06 PROCEDURE — 84484 ASSAY OF TROPONIN QUANT: CPT

## 2024-12-06 RX ORDER — CARVEDILOL 25 MG/1
1 TABLET, FILM COATED ORAL
Qty: 0 | Refills: 0 | DISCHARGE
Start: 2024-12-06

## 2024-12-06 RX ORDER — CEFPODOXIME PROXETIL 100 MG/5ML
1 GRANULE, FOR SUSPENSION ORAL
Qty: 12 | Refills: 0
Start: 2024-12-06 | End: 2024-12-17

## 2024-12-06 RX ORDER — LORAZEPAM 2 MG/1
1 TABLET ORAL ONCE
Refills: 0 | Status: DISCONTINUED | OUTPATIENT
Start: 2024-12-06 | End: 2024-12-06

## 2024-12-06 RX ORDER — METRONIDAZOLE 500 MG/1
1 TABLET ORAL
Qty: 36 | Refills: 0
Start: 2024-12-06 | End: 2024-12-17

## 2024-12-06 RX ADMIN — LORAZEPAM 1 MILLIGRAM(S): 2 TABLET ORAL at 01:07

## 2024-12-06 NOTE — CHART NOTE - NSCHARTNOTEFT_GEN_A_CORE
Patient noted to have left AMA last night    Repeat blood cultures negative x 2. Case was discussed yesterday with Dr Lobo recommendation for Vantin and Flagyl for a total of 14 days of Antibiotics. Received 2 days of IV Meropenem inpatient    Called patient discussed the importance of adherence to antibiotic treatment in the setting of bacteremia and history of IE. Prescribed 12 days of PO vantin 200mg Q24 renally dosed with flagyl 500mg Q8 hours to patient's pharmacy. Recommended follow up with Dr Lobo in 2 weeks.
Medicine PA-  Cd initially for pt upset that he isn't able to sleep and wants something stronger than melatonin.  He's frustrated with being woken up and feels anxious.  He wanted to leave AMA at one point but I spoke to him in detail and he felt relieved and stayed.  Ordered xanax 0.5mg which helped him relax, but woke up frustrated again and valium 5mg was given.    (0400) ADDENDUM:  Pt very anxious and angry at being woken up, "feels better" and wants to leave.  He is A+O x4 and said "he'll go to his regular kidney Dr."  Stated he wasn't going to stay another night and just "watch the sun rising".  Despite reassurance and support by self and nurse he left AMA.  Dr. Rodríguez notified.

## 2024-12-09 LAB
CULTURE RESULTS: SIGNIFICANT CHANGE UP
CULTURE RESULTS: SIGNIFICANT CHANGE UP
SPECIMEN SOURCE: SIGNIFICANT CHANGE UP
SPECIMEN SOURCE: SIGNIFICANT CHANGE UP

## 2024-12-10 ENCOUNTER — INPATIENT (INPATIENT)
Facility: HOSPITAL | Age: 64
LOS: 3 days | Discharge: HOME | DRG: 189 | End: 2024-12-14
Attending: STUDENT IN AN ORGANIZED HEALTH CARE EDUCATION/TRAINING PROGRAM | Admitting: STUDENT IN AN ORGANIZED HEALTH CARE EDUCATION/TRAINING PROGRAM
Payer: MEDICARE

## 2024-12-10 VITALS
RESPIRATION RATE: 38 BRPM | TEMPERATURE: 97 F | SYSTOLIC BLOOD PRESSURE: 190 MMHG | HEART RATE: 127 BPM | DIASTOLIC BLOOD PRESSURE: 82 MMHG | OXYGEN SATURATION: 91 %

## 2024-12-10 DIAGNOSIS — Z86.79 PERSONAL HISTORY OF OTHER DISEASES OF THE CIRCULATORY SYSTEM: Chronic | ICD-10-CM

## 2024-12-10 DIAGNOSIS — Z94.0 KIDNEY TRANSPLANT STATUS: Chronic | ICD-10-CM

## 2024-12-10 DIAGNOSIS — Z95.2 PRESENCE OF PROSTHETIC HEART VALVE: Chronic | ICD-10-CM

## 2024-12-10 DIAGNOSIS — Z95.818 PRESENCE OF OTHER CARDIAC IMPLANTS AND GRAFTS: Chronic | ICD-10-CM

## 2024-12-10 DIAGNOSIS — I77.0 ARTERIOVENOUS FISTULA, ACQUIRED: Chronic | ICD-10-CM

## 2024-12-10 DIAGNOSIS — Z95.1 PRESENCE OF AORTOCORONARY BYPASS GRAFT: Chronic | ICD-10-CM

## 2024-12-10 DIAGNOSIS — Z90.49 ACQUIRED ABSENCE OF OTHER SPECIFIED PARTS OF DIGESTIVE TRACT: Chronic | ICD-10-CM

## 2024-12-10 DIAGNOSIS — J81.1 CHRONIC PULMONARY EDEMA: ICD-10-CM

## 2024-12-10 LAB
ALBUMIN SERPL ELPH-MCNC: 4.2 G/DL — SIGNIFICANT CHANGE UP (ref 3.3–5.2)
ALP SERPL-CCNC: 97 U/L — SIGNIFICANT CHANGE UP (ref 40–120)
ALT FLD-CCNC: 9 U/L — SIGNIFICANT CHANGE UP
ANION GAP SERPL CALC-SCNC: 21 MMOL/L — HIGH (ref 5–17)
APTT BLD: 41.6 SEC — HIGH (ref 24.5–35.6)
AST SERPL-CCNC: 16 U/L — SIGNIFICANT CHANGE UP
BASOPHILS # BLD AUTO: 0.07 K/UL — SIGNIFICANT CHANGE UP (ref 0–0.2)
BASOPHILS NFR BLD AUTO: 1 % — SIGNIFICANT CHANGE UP (ref 0–2)
BILIRUB SERPL-MCNC: 1.3 MG/DL — SIGNIFICANT CHANGE UP (ref 0.4–2)
BUN SERPL-MCNC: 119.2 MG/DL — HIGH (ref 8–20)
CALCIUM SERPL-MCNC: 9.3 MG/DL — SIGNIFICANT CHANGE UP (ref 8.4–10.5)
CHLORIDE SERPL-SCNC: 101 MMOL/L — SIGNIFICANT CHANGE UP (ref 96–108)
CO2 SERPL-SCNC: 17 MMOL/L — LOW (ref 22–29)
CREAT SERPL-MCNC: 8.53 MG/DL — HIGH (ref 0.5–1.3)
EGFR: 6 ML/MIN/1.73M2 — LOW
EOSINOPHIL # BLD AUTO: 0.2 K/UL — SIGNIFICANT CHANGE UP (ref 0–0.5)
EOSINOPHIL NFR BLD AUTO: 2.8 % — SIGNIFICANT CHANGE UP (ref 0–6)
FLUAV AG NPH QL: SIGNIFICANT CHANGE UP
FLUBV AG NPH QL: SIGNIFICANT CHANGE UP
GAS PNL BLDV: SIGNIFICANT CHANGE UP
GLUCOSE SERPL-MCNC: 98 MG/DL — SIGNIFICANT CHANGE UP (ref 70–99)
HCT VFR BLD CALC: 27.9 % — LOW (ref 39–50)
HGB BLD-MCNC: 8.7 G/DL — LOW (ref 13–17)
IMM GRANULOCYTES NFR BLD AUTO: 0.4 % — SIGNIFICANT CHANGE UP (ref 0–0.9)
INR BLD: 1.37 RATIO — HIGH (ref 0.85–1.16)
LACTATE SERPL-SCNC: 0.7 MMOL/L — SIGNIFICANT CHANGE UP (ref 0.5–2)
LYMPHOCYTES # BLD AUTO: 0.81 K/UL — LOW (ref 1–3.3)
LYMPHOCYTES # BLD AUTO: 11.2 % — LOW (ref 13–44)
MCHC RBC-ENTMCNC: 30.7 PG — SIGNIFICANT CHANGE UP (ref 27–34)
MCHC RBC-ENTMCNC: 31.2 G/DL — LOW (ref 32–36)
MCV RBC AUTO: 98.6 FL — SIGNIFICANT CHANGE UP (ref 80–100)
MONOCYTES # BLD AUTO: 0.67 K/UL — SIGNIFICANT CHANGE UP (ref 0–0.9)
MONOCYTES NFR BLD AUTO: 9.3 % — SIGNIFICANT CHANGE UP (ref 2–14)
NEUTROPHILS # BLD AUTO: 5.43 K/UL — SIGNIFICANT CHANGE UP (ref 1.8–7.4)
NEUTROPHILS NFR BLD AUTO: 75.3 % — SIGNIFICANT CHANGE UP (ref 43–77)
PLATELET # BLD AUTO: 114 K/UL — LOW (ref 150–400)
POTASSIUM SERPL-MCNC: 5 MMOL/L — SIGNIFICANT CHANGE UP (ref 3.5–5.3)
POTASSIUM SERPL-SCNC: 5 MMOL/L — SIGNIFICANT CHANGE UP (ref 3.5–5.3)
PROT SERPL-MCNC: 7.4 G/DL — SIGNIFICANT CHANGE UP (ref 6.6–8.7)
PROTHROM AB SERPL-ACNC: 15.4 SEC — HIGH (ref 9.9–13.4)
RBC # BLD: 2.83 M/UL — LOW (ref 4.2–5.8)
RBC # FLD: 16.2 % — HIGH (ref 10.3–14.5)
RSV RNA NPH QL NAA+NON-PROBE: SIGNIFICANT CHANGE UP
SARS-COV-2 RNA SPEC QL NAA+PROBE: SIGNIFICANT CHANGE UP
SODIUM SERPL-SCNC: 139 MMOL/L — SIGNIFICANT CHANGE UP (ref 135–145)
TROPONIN T, HIGH SENSITIVITY RESULT: 104 NG/L — HIGH (ref 0–51)
TROPONIN T, HIGH SENSITIVITY RESULT: 96 NG/L — HIGH (ref 0–51)
WBC # BLD: 7.21 K/UL — SIGNIFICANT CHANGE UP (ref 3.8–10.5)
WBC # FLD AUTO: 7.21 K/UL — SIGNIFICANT CHANGE UP (ref 3.8–10.5)

## 2024-12-10 PROCEDURE — 71045 X-RAY EXAM CHEST 1 VIEW: CPT | Mod: 26

## 2024-12-10 PROCEDURE — 99285 EMERGENCY DEPT VISIT HI MDM: CPT | Mod: GC

## 2024-12-10 PROCEDURE — 99223 1ST HOSP IP/OBS HIGH 75: CPT

## 2024-12-10 PROCEDURE — 93010 ELECTROCARDIOGRAM REPORT: CPT

## 2024-12-10 RX ORDER — FUROSEMIDE 40 MG/1
40 TABLET ORAL ONCE
Refills: 0 | Status: COMPLETED | OUTPATIENT
Start: 2024-12-10 | End: 2024-12-10

## 2024-12-10 RX ORDER — CYANOCOBALAMIN/FOLIC AC/VIT B6 1-2.2-25MG
1 TABLET ORAL DAILY
Refills: 0 | Status: DISCONTINUED | OUTPATIENT
Start: 2024-12-10 | End: 2024-12-14

## 2024-12-10 RX ORDER — MEROPENEM 500 MG/1
1000 INJECTION, POWDER, FOR SOLUTION INTRAVENOUS ONCE
Refills: 0 | Status: COMPLETED | OUTPATIENT
Start: 2024-12-10 | End: 2024-12-10

## 2024-12-10 RX ORDER — AMLODIPINE BESYLATE 10 MG/1
5 TABLET ORAL DAILY
Refills: 0 | Status: DISCONTINUED | OUTPATIENT
Start: 2024-12-10 | End: 2024-12-14

## 2024-12-10 RX ORDER — PANTOPRAZOLE SODIUM 40 MG/1
40 TABLET, DELAYED RELEASE ORAL
Refills: 0 | Status: DISCONTINUED | OUTPATIENT
Start: 2024-12-10 | End: 2024-12-14

## 2024-12-10 RX ORDER — LEVETIRACETAM 1000 MG/1
1000 TABLET ORAL
Refills: 0 | Status: DISCONTINUED | OUTPATIENT
Start: 2024-12-10 | End: 2024-12-11

## 2024-12-10 RX ORDER — CLONIDINE HYDROCHLORIDE 0.3 MG/1
0.1 TABLET ORAL
Refills: 0 | Status: DISCONTINUED | OUTPATIENT
Start: 2024-12-10 | End: 2024-12-14

## 2024-12-10 RX ORDER — ISOSORBIDE MONONITRATE 10 MG
60 TABLET ORAL DAILY
Refills: 0 | Status: DISCONTINUED | OUTPATIENT
Start: 2024-12-11 | End: 2024-12-14

## 2024-12-10 RX ORDER — CLONIDINE HYDROCHLORIDE 0.3 MG/1
0.1 TABLET ORAL ONCE
Refills: 0 | Status: COMPLETED | OUTPATIENT
Start: 2024-12-10 | End: 2024-12-10

## 2024-12-10 RX ORDER — ALPRAZOLAM 0.5 MG
0.5 TABLET ORAL EVERY 8 HOURS
Refills: 0 | Status: DISCONTINUED | OUTPATIENT
Start: 2024-12-10 | End: 2024-12-14

## 2024-12-10 RX ORDER — FUROSEMIDE 40 MG/1
40 TABLET ORAL DAILY
Refills: 0 | Status: DISCONTINUED | OUTPATIENT
Start: 2024-12-10 | End: 2024-12-10

## 2024-12-10 RX ORDER — LORAZEPAM 2 MG/1
1 TABLET ORAL ONCE
Refills: 0 | Status: DISCONTINUED | OUTPATIENT
Start: 2024-12-10 | End: 2024-12-10

## 2024-12-10 RX ORDER — HYDROMORPHONE HYDROCHLORIDE 2 MG/1
0.5 TABLET ORAL EVERY 6 HOURS
Refills: 0 | Status: DISCONTINUED | OUTPATIENT
Start: 2024-12-10 | End: 2024-12-14

## 2024-12-10 RX ORDER — HEPARIN SODIUM,PORCINE 1000/ML
5000 VIAL (ML) INJECTION EVERY 12 HOURS
Refills: 0 | Status: DISCONTINUED | OUTPATIENT
Start: 2024-12-10 | End: 2024-12-14

## 2024-12-10 RX ORDER — HYDRALAZINE HYDROCHLORIDE 10 MG/1
100 TABLET ORAL THREE TIMES A DAY
Refills: 0 | Status: DISCONTINUED | OUTPATIENT
Start: 2024-12-10 | End: 2024-12-14

## 2024-12-10 RX ORDER — TORSEMIDE 10 MG
20 TABLET ORAL DAILY
Refills: 0 | Status: DISCONTINUED | OUTPATIENT
Start: 2024-12-11 | End: 2024-12-12

## 2024-12-10 RX ORDER — METOPROLOL TARTRATE 100 MG/1
5 TABLET, FILM COATED ORAL ONCE
Refills: 0 | Status: COMPLETED | OUTPATIENT
Start: 2024-12-10 | End: 2024-12-10

## 2024-12-10 RX ORDER — VANCOMYCIN HCL 900 MCG/MG
1000 POWDER (GRAM) MISCELLANEOUS ONCE
Refills: 0 | Status: COMPLETED | OUTPATIENT
Start: 2024-12-10 | End: 2024-12-10

## 2024-12-10 RX ORDER — TAMSULOSIN HYDROCHLORIDE 0.4 MG/1
0.4 CAPSULE ORAL AT BEDTIME
Refills: 0 | Status: DISCONTINUED | OUTPATIENT
Start: 2024-12-10 | End: 2024-12-14

## 2024-12-10 RX ORDER — METRONIDAZOLE 500 MG/1
500 TABLET ORAL EVERY 8 HOURS
Refills: 0 | Status: DISCONTINUED | OUTPATIENT
Start: 2024-12-10 | End: 2024-12-14

## 2024-12-10 RX ORDER — CEFPODOXIME PROXETIL 100 MG/5ML
200 GRANULE, FOR SUSPENSION ORAL EVERY 12 HOURS
Refills: 0 | Status: DISCONTINUED | OUTPATIENT
Start: 2024-12-10 | End: 2024-12-11

## 2024-12-10 RX ORDER — HYDRALAZINE HYDROCHLORIDE 10 MG/1
100 TABLET ORAL ONCE
Refills: 0 | Status: COMPLETED | OUTPATIENT
Start: 2024-12-10 | End: 2024-12-10

## 2024-12-10 RX ADMIN — FUROSEMIDE 40 MILLIGRAM(S): 40 TABLET ORAL at 20:10

## 2024-12-10 RX ADMIN — CLONIDINE HYDROCHLORIDE 0.1 MILLIGRAM(S): 0.3 TABLET ORAL at 21:40

## 2024-12-10 RX ADMIN — METOPROLOL TARTRATE 5 MILLIGRAM(S): 100 TABLET, FILM COATED ORAL at 20:14

## 2024-12-10 RX ADMIN — LORAZEPAM 1 MILLIGRAM(S): 2 TABLET ORAL at 22:27

## 2024-12-10 RX ADMIN — Medication 250 MILLIGRAM(S): at 20:14

## 2024-12-10 RX ADMIN — HYDRALAZINE HYDROCHLORIDE 100 MILLIGRAM(S): 10 TABLET ORAL at 21:40

## 2024-12-10 RX ADMIN — LORAZEPAM 1 MILLIGRAM(S): 2 TABLET ORAL at 20:17

## 2024-12-10 RX ADMIN — MEROPENEM 1000 MILLIGRAM(S): 500 INJECTION, POWDER, FOR SOLUTION INTRAVENOUS at 20:14

## 2024-12-10 NOTE — ED PROVIDER NOTE - OBJECTIVE STATEMENT
64 year old male w/PMHx Afib status post  watchman, no longer on AC,  ESRD on HD MWF, chronic anemia, CAD (s/p CABG), aortic & mitral valve replacement, aortic dissection s/p repair, ischemic bowel s/p resection, seizure disorder (on keppra), IE in 2023 presents to the ED with SOB. Patient states he was recently discharged from the hospital. On arriving home with worsening SOB, was supposed to start O2 at home however "left without picking it up". Notes he missed dialysis session today as well. On prior admission had blood cultures positive for bacteroides vulgatas, was discharged on flagyl for 14 days. Reports compliance with home meds. 64 year old male w/PMHx Afib status post  watchman, no longer on AC,  ESRD on HD MWF, chronic anemia, CAD (s/p CABG), aortic & mitral valve replacement, aortic dissection s/p repair, ischemic bowel s/p resection, seizure disorder (on keppra), IE in 2023 presents to the ED with SOB. Patient states he was recently discharged from the hospital. On arriving home with worsening SOB, was supposed to start O2 at home however "left without picking it up". Notes he was supposed to undergo dialysis Monday and Tuesday for a missed session on Saturday however patient missed both session yesterday and today due to "vomiting". On prior admission had blood cultures positive for bacteroides vulgatas, was discharged on flagyl for 14 days. Reports compliance with home meds. Follows with Dr. Felipe (cards) and Dr. Roldan (nephro). 64 year old male w/PMHx Afib status post  watchman, no longer on AC,  ESRD on HD TTS, chronic anemia, CAD (s/p CABG), aortic & mitral valve replacement, aortic dissection s/p repair, ischemic bowel s/p resection, seizure disorder (on keppra), IE in 2023 presents to the ED with SOB. Patient states he was recently discharged from the hospital. On arriving home with worsening SOB, was supposed to start O2 at home however "left without picking it up". Notes he was supposed to undergo dialysis Monday and Tuesday for a missed session on Saturday however patient missed both session yesterday and today due to "vomiting". On prior admission had blood cultures positive for bacteroides vulgatas, was discharged on flagyl for 14 days. Reports compliance with home meds. Follows with Dr. Felipe (cards) and Dr. Roldan (nephro).

## 2024-12-10 NOTE — ED ADULT NURSE NOTE - NSFALLRISKINTERV_ED_ALL_ED
Communicate fall risk and risk factors to all staff, patient, and family/Provide visual cue: yellow wristband, yellow gown, etc/Reinforce activity limits and safety measures with patient and family/Use of alarms - bed, stretcher, chair and/or video monitoring/Call bell, personal items and telephone in reach/Instruct patient to call for assistance before getting out of bed/chair/stretcher/Non-slip footwear applied when patient is off stretcher/[place] to call system/Physically safe environment - no spills, clutter or unnecessary equipment/Purposeful Proactive Rounding/Room/bathroom lighting operational, light cord in reach

## 2024-12-10 NOTE — ED PROVIDER NOTE - ATTENDING CONTRIBUTION TO CARE
64y M w/ hx Afib s/p Wtachman, ESRD on HD Tu/Th/Sa, chronic anemia, CAD (s/p CABG), aortic & mitral valve replacement, aortic dissection s/p repair, ischemic bowel s/p resection, seizure disorder (on keppra), IE in 2023; presents for shortness of breath. Pt says he was supposed to go for dialysis yesterday and today but missed it. Pt was recently admitted to this hospital for acute respiratory failure; had blood culture that was positive for Bacteroides. Pt ultimately signed out AMA but was sent Vantin and Flagyl. Pt complaining of increased shortness of breath since earlier today. On exam pt tachypneic and anxious, heart tachycardic. Lungs diminished at bases b/l. No pedal edema. Initially satting 91% on RA. Rectally afebrile. Treated with metoprolol and lasix with improvement. Nephro consult placed. Admitted to medicine.

## 2024-12-10 NOTE — ED ADULT NURSE NOTE - OBJECTIVE STATEMENT
pt presents for SOB, dialysis patient, does not remember last time he received dialysis, was in the hospital 1 week ago for same. tachypneic, visibly anxious, a&ox4, on tele/

## 2024-12-10 NOTE — ED ADULT TRIAGE NOTE - CHIEF COMPLAINT QUOTE
PT presents to ED for resp distress. recent dc. hx dialysis LUE fistula. unable to take medications recently due to illness

## 2024-12-10 NOTE — ED PROVIDER NOTE - PHYSICAL EXAMINATION
Gen: NAD, AOx3  Head: NCAT  HEENT: dry oral mucosa  Lung: left LL rales  CV: rrr, Normal perfusion  Abd: soft, NTND  MSK: No edema, no visible deformities  Neuro: No focal neurologic deficits  Skin: No rash   Psych: anxious

## 2024-12-10 NOTE — INPATIENT CERTIFICATION FOR MEDICARE PATIENTS - CURRENT MEDICAL NEEDS AND CARE PLANS
August 14, 2018      Ric Tristan MD  [address] [place] [address]           [facility]  [address]  [place] [address]  Phone: [phone]  Fax: [phone]          Patient: Rose Milligan   MR Number: [id number]   YOB: 1952   Date of Visit: 8/14/2018       Dear Dr. Ric Tristan:    Thank you for referring Rose Milligan to me for evaluation. Attached you will find relevant portions of my assessment and plan of care.    If you have questions, please do not hesitate to call me. I look forward to following Rose Milligan along with you.    Sincerely,    Vivi Sotelo PA-C    Enclosure  CC:  No Recipients    If you would like to receive this communication electronically, please contact [email or web address] or [phone] to request more information on [employer] access.    For providers and/or their staff who would like to refer a patient to [facility], please contact us through our one-stop-shop provider referral line, Colin Schultz, at [phone].    If you feel you have received this communication in error or would no longer like to receive these types of communications, please e-mail [email or web address]          Possible Home/Possible Skilled Nursing Facilty (SNF)

## 2024-12-10 NOTE — ED PROVIDER NOTE - CLINICAL SUMMARY MEDICAL DECISION MAKING FREE TEXT BOX
On arrival tachypneic, tachycardic 130s, mildly hypoxic 91% on RA. Placed on 2L with improvement to 100%. Otherwise HD stable. Will obtain blood work, cultures, chest xray. Suspected pulmonary edema in setting of missed dialysis session.

## 2024-12-11 LAB
APPEARANCE UR: CLEAR — SIGNIFICANT CHANGE UP
BACTERIA # UR AUTO: NEGATIVE /HPF — SIGNIFICANT CHANGE UP
BILIRUB UR-MCNC: NEGATIVE — SIGNIFICANT CHANGE UP
CAST: 2 /LPF — SIGNIFICANT CHANGE UP (ref 0–4)
COLOR SPEC: YELLOW — SIGNIFICANT CHANGE UP
DIFF PNL FLD: NEGATIVE — SIGNIFICANT CHANGE UP
GLUCOSE UR QL: NEGATIVE MG/DL — SIGNIFICANT CHANGE UP
KETONES UR-MCNC: NEGATIVE MG/DL — SIGNIFICANT CHANGE UP
LEUKOCYTE ESTERASE UR-ACNC: NEGATIVE — SIGNIFICANT CHANGE UP
NITRITE UR-MCNC: NEGATIVE — SIGNIFICANT CHANGE UP
PH UR: 6 — SIGNIFICANT CHANGE UP (ref 5–8)
PROT UR-MCNC: 100 MG/DL
RBC CASTS # UR COMP ASSIST: 2 /HPF — SIGNIFICANT CHANGE UP (ref 0–4)
SP GR SPEC: 1.01 — SIGNIFICANT CHANGE UP (ref 1–1.03)
SQUAMOUS # UR AUTO: 0 /HPF — SIGNIFICANT CHANGE UP (ref 0–5)
UROBILINOGEN FLD QL: 0.2 MG/DL — SIGNIFICANT CHANGE UP (ref 0.2–1)
WBC UR QL: 0 /HPF — SIGNIFICANT CHANGE UP (ref 0–5)

## 2024-12-11 PROCEDURE — 36410 VNPNXR 3YR/> PHY/QHP DX/THER: CPT | Mod: 59

## 2024-12-11 PROCEDURE — 99233 SBSQ HOSP IP/OBS HIGH 50: CPT

## 2024-12-11 PROCEDURE — 36556 INSERT NON-TUNNEL CV CATH: CPT

## 2024-12-11 PROCEDURE — 76942 ECHO GUIDE FOR BIOPSY: CPT | Mod: 26,59

## 2024-12-11 PROCEDURE — 76937 US GUIDE VASCULAR ACCESS: CPT | Mod: 26

## 2024-12-11 RX ORDER — CEFPODOXIME PROXETIL 100 MG/5ML
200 GRANULE, FOR SUSPENSION ORAL DAILY
Refills: 0 | Status: DISCONTINUED | OUTPATIENT
Start: 2024-12-11 | End: 2024-12-14

## 2024-12-11 RX ORDER — LEVETIRACETAM 1000 MG/1
1 TABLET ORAL
Refills: 0 | DISCHARGE

## 2024-12-11 RX ORDER — CARVEDILOL 25 MG/1
12.5 TABLET, FILM COATED ORAL EVERY 12 HOURS
Refills: 0 | Status: DISCONTINUED | OUTPATIENT
Start: 2024-12-11 | End: 2024-12-12

## 2024-12-11 RX ORDER — HYDRALAZINE HYDROCHLORIDE 10 MG/1
1 TABLET ORAL
Refills: 0 | DISCHARGE

## 2024-12-11 RX ORDER — LEVETIRACETAM 1000 MG/1
500 TABLET ORAL
Refills: 0 | Status: DISCONTINUED | OUTPATIENT
Start: 2024-12-11 | End: 2024-12-14

## 2024-12-11 RX ORDER — TORSEMIDE 10 MG
1 TABLET ORAL
Refills: 0 | DISCHARGE

## 2024-12-11 RX ORDER — ISOSORBIDE MONONITRATE 10 MG
1 TABLET ORAL
Refills: 0 | DISCHARGE

## 2024-12-11 RX ORDER — RISANKIZUMAB-RZAA 360 MG/2.4
0 KIT SUBCUTANEOUS
Refills: 0 | DISCHARGE

## 2024-12-11 RX ORDER — BUTALB/ACETAMINOPHEN/CAFFEINE 50-325-40
1 TABLET ORAL ONCE
Refills: 0 | Status: COMPLETED | OUTPATIENT
Start: 2024-12-11 | End: 2024-12-11

## 2024-12-11 RX ADMIN — HYDRALAZINE HYDROCHLORIDE 100 MILLIGRAM(S): 10 TABLET ORAL at 06:50

## 2024-12-11 RX ADMIN — Medication 1 TABLET(S): at 12:55

## 2024-12-11 RX ADMIN — HYDROMORPHONE HYDROCHLORIDE 0.5 MILLIGRAM(S): 2 TABLET ORAL at 17:26

## 2024-12-11 RX ADMIN — CLONIDINE HYDROCHLORIDE 0.1 MILLIGRAM(S): 0.3 TABLET ORAL at 06:49

## 2024-12-11 RX ADMIN — Medication 1 TABLET(S): at 19:02

## 2024-12-11 RX ADMIN — METRONIDAZOLE 500 MILLIGRAM(S): 500 TABLET ORAL at 06:50

## 2024-12-11 RX ADMIN — PANTOPRAZOLE SODIUM 40 MILLIGRAM(S): 40 TABLET, DELAYED RELEASE ORAL at 06:50

## 2024-12-11 RX ADMIN — HYDROMORPHONE HYDROCHLORIDE 0.5 MILLIGRAM(S): 2 TABLET ORAL at 22:57

## 2024-12-11 RX ADMIN — Medication 40 MILLIGRAM(S): at 21:58

## 2024-12-11 RX ADMIN — TAMSULOSIN HYDROCHLORIDE 0.4 MILLIGRAM(S): 0.4 CAPSULE ORAL at 21:58

## 2024-12-11 RX ADMIN — Medication 5000 UNIT(S): at 06:49

## 2024-12-11 RX ADMIN — Medication 60 MILLIGRAM(S): at 12:53

## 2024-12-11 RX ADMIN — Medication 81 MILLIGRAM(S): at 12:55

## 2024-12-11 RX ADMIN — PANTOPRAZOLE SODIUM 40 MILLIGRAM(S): 40 TABLET, DELAYED RELEASE ORAL at 18:07

## 2024-12-11 RX ADMIN — HYDROMORPHONE HYDROCHLORIDE 0.5 MILLIGRAM(S): 2 TABLET ORAL at 02:11

## 2024-12-11 RX ADMIN — HYDROMORPHONE HYDROCHLORIDE 0.5 MILLIGRAM(S): 2 TABLET ORAL at 15:14

## 2024-12-11 RX ADMIN — Medication 20 MILLIGRAM(S): at 06:50

## 2024-12-11 RX ADMIN — METRONIDAZOLE 500 MILLIGRAM(S): 500 TABLET ORAL at 21:57

## 2024-12-11 RX ADMIN — CEFPODOXIME PROXETIL 200 MILLIGRAM(S): 100 GRANULE, FOR SUSPENSION ORAL at 12:55

## 2024-12-11 RX ADMIN — CARVEDILOL 12.5 MILLIGRAM(S): 25 TABLET, FILM COATED ORAL at 18:07

## 2024-12-11 RX ADMIN — LEVETIRACETAM 500 MILLIGRAM(S): 1000 TABLET ORAL at 18:08

## 2024-12-11 RX ADMIN — FUROSEMIDE 40 MILLIGRAM(S): 40 TABLET ORAL at 02:11

## 2024-12-11 RX ADMIN — AMLODIPINE BESYLATE 5 MILLIGRAM(S): 10 TABLET ORAL at 06:50

## 2024-12-11 RX ADMIN — Medication 1 TABLET(S): at 18:07

## 2024-12-11 RX ADMIN — HYDRALAZINE HYDROCHLORIDE 100 MILLIGRAM(S): 10 TABLET ORAL at 21:58

## 2024-12-11 RX ADMIN — HYDRALAZINE HYDROCHLORIDE 100 MILLIGRAM(S): 10 TABLET ORAL at 12:54

## 2024-12-11 RX ADMIN — CLONIDINE HYDROCHLORIDE 0.1 MILLIGRAM(S): 0.3 TABLET ORAL at 18:08

## 2024-12-11 RX ADMIN — HYDROMORPHONE HYDROCHLORIDE 0.5 MILLIGRAM(S): 2 TABLET ORAL at 21:57

## 2024-12-11 RX ADMIN — LEVETIRACETAM 500 MILLIGRAM(S): 1000 TABLET ORAL at 06:50

## 2024-12-11 RX ADMIN — Medication 0.5 MILLIGRAM(S): at 00:01

## 2024-12-11 RX ADMIN — METRONIDAZOLE 500 MILLIGRAM(S): 500 TABLET ORAL at 12:55

## 2024-12-11 NOTE — PATIENT PROFILE ADULT - FALL HARM RISK - HARM RISK INTERVENTIONS

## 2024-12-11 NOTE — PROCEDURE NOTE - ADDITIONAL PROCEDURE DETAILS
#18G 10CM 30CIRC BARD POWER GLIDE MIDLINE inserted with ultrasound guidance.   Good flash, ns flush right basilic vein.   Patient tolerated well.

## 2024-12-11 NOTE — H&P ADULT - ASSESSMENT
65 y/o M w/ hx of Afib, s/p watchman, no longer on AC, ESRD on HD M/W/F via left UE AFV, chronic anemia, CAD s/p CABG, aortic & mitral valve replacement in 11/20 with Dr. Butler, aortic dissection s/p repair, ischemic bowel s/p resection, seizure disorder on Keppra, IE of MV with MSSA and s/p course of Ancef with f/u MUSTAPHA negative for vegetation in 23' admitted with volume overload, uremic gastropathy from missing HD and non-compliance with meds.    ESRD with Uremia gastropathy/Volume overload:  -Admit to tele  -Lasix 80mg IV given with good response  -Resume torsemide   -Resume o/p regimen  -Await Nephrology to arrange HD today  -PRN zofran and IV PPI for uremic gastropathy  -Counseled on medication compliance and HD compliance   -OOB, ambulate with assistance, PT/SW for possible SEAN on DC  -VC boots/Heparin Sub Q   -Fall risk  -Renal diet     Afib:  -s/p Watchman  -Cont. Aspirin  -Cont. Coreg    CAD s/p CABG:  -Denies CP/Anginal symptoms  -Troponin chronically elevated from ESRD  -Resume o/p regimen    Chronic anemia:  -Hbg at baseline  -FA/Nephrovite     Seizure disorder:  -No recent seizures reported  -Cont. Keppra     Recent Bacteroides bacteremia:  -No fever, no leucocytosis  -Blood Cx cleared before left AMA  -ID notes reviewed  -Repeat blood Cx sent by ED if pos, may need MUSTAPHA vs CT abd/pelvis  -Cont. Flagyl/Vantin renal dose    -Discussed with ED staff, Patient who agrees with above  -High risk of AMA based on previous behavior   -Dispo: SEAN vs Home pending Nephrology eval, cultures, and possible need for MUSTAPHA. Anticipated LOS 2-3 days

## 2024-12-11 NOTE — H&P ADULT - HISTORY OF PRESENT ILLNESS
65 y/o M w/ hx of Afib, s/p watchman, no longer on AC, ESRD on HD M/W/F via left UE AFV, chronic anemia, CAD s/p CABG, aortic & mitral valve replacement in 11/20 with Dr. Butler, aortic dissection s/p repair, ischemic bowel s/p resection, seizure disorder on Keppra, IE of MV with MSSA and s/p course of Ancef with f/u MUSTAPHA negative for vegetation in 23'. Patient admitted from 11/4 to 11/12/24 after missing HD and needing emergent HD for for HTN urgency/volume overload. Had GIB at that time as well requiring PRBC transfusion and GI eval with colonoscopy showing IH and neovascularization of anastomosis from prior bowel resection. At that time plavix was dcd and Aspirin cont. He was then readmitted from 12/1 to 12/6/24 for volume overload again from missing HD. He was found to have bacteroides bacteremia and was seen by ID and had repeat blood cx which where neg. Patient ultimately left AMA on 12/6 and was called after DC and advised to be on Abx with Vantin/Flagyl which were sent to his Pharmacy. Patient states he lives alone and is normally independent however since leaving A he has been weak and not able to get his meds at the Pharmacy, he has also been with epigastric pain and with NBMB vomiting. Patient returns to ED today c/o SOB, and fatigue. Still nauseated but denies vomiting. In the ED noted to be hypertensive, and labs with BUN of 120 and Cr. of 8. Requiring 2 L NC to maintain normal sat. CXR with mild PVC. Given clonidine, lasix, and hydralazine with improvement in BP. ED Placed consult to Nephrology for HD in AM. Patient denies issues with AVF. Denies CP, HA, focal weakness, or recurrent bleeding. Hbg at baseline.

## 2024-12-11 NOTE — H&P ADULT - TIME BILLING
Reviewing prior medical records, EMS events, ED course, independently reviewing labs/imaging studies, discussing case with ED attending, examining patient, furnishing H&P, orders, doing medication reconciliation, discussing plan of care with Patient and answering all their questions.

## 2024-12-11 NOTE — PROGRESS NOTE ADULT - ASSESSMENT
65 y/o M w/ hx of Afib, s/p watchman, no longer on AC, ESRD on HD M/W/F via left UE AFV, chronic anemia, CAD s/p CABG, aortic & mitral valve replacement in 11/20 with Dr. Butler, aortic dissection s/p repair, ischemic bowel s/p resection, seizure disorder on Keppra, IE of MV with MSSA and s/p course of Ancef with f/u MUSTAPHA negative for vegetation in 23' admitted with volume overload, uremic gastropathy from missing HD and non-compliance with meds.    ESRD with Uremia gastropathy/Volume overload:  - Telemetry monitoring  - Lasix 80mg IV given with good response  - Torsemide 20mg daily    Afib:  -s/p Watchman  - Aspirin 81mg daily  - Coreg 12.5mg q12h    CAD s/p CABG  - Denies CP/Anginal symptoms  - Troponin chronically elevated from ESRD  - Aspirin 81mg daily  - Lipitor 40mg nightly    Chronic anemia  - Hbg at baseline  - FA/Nephrovite     Seizure disorder:  - No recent seizures reported  - Keppra 500mg BID    Recent Bacteroides bacteremia:  - Blood Cx cleared before left AMA  - ID notes reviewed  - Follow blood culture in process  - Cont. Flagyl/Vantin renal dose    Dispo: Pending clinical course

## 2024-12-11 NOTE — PATIENT PROFILE ADULT - NSTRANSFERBELONGINGSDISPO_GEN_A_NUR
[facility] Nurse Msg Pool  Enbrel 50mg denied     Submit a new [id number] for Humira.  I will close this referral.     Rohan Watson 3/7/22 not applicable

## 2024-12-11 NOTE — ED ADULT NURSE REASSESSMENT NOTE - NS ED NURSE REASSESS COMMENT FT1
Assumed care of patient at 0400, resting on stretcher in no acute distress with 1:1 observation in place for safety.  No complaints voiced at this time, pt is admitted and awaiting bed.

## 2024-12-11 NOTE — PHYSICAL THERAPY INITIAL EVALUATION ADULT - ADDITIONAL COMMENTS
Pt is a poor historian, reports living alone in a 1 story house with 4 steps to enter. Modified Independent with all PTA, without devices.

## 2024-12-11 NOTE — PROGRESS NOTE ADULT - SUBJECTIVE AND OBJECTIVE BOX
Chief complaint: Missed HD    Patient seen and examined at bedside. No acute overnight events reported. Patient states he is tired but no other complaints. No fever, chills, cough, nausea or vomiting.     Vital Signs Last 24 Hrs  T(F): 97.6 (11 Dec 2024 10:47), Max: 98 (11 Dec 2024 03:41)  HR: 112 (11 Dec 2024 10:47) (78 - 127)  BP: 162/91 (11 Dec 2024 10:47) (150/88 - 190/82)  RR: 20 (11 Dec 2024 10:47) (12 - 38)  SpO2: 100% (11 Dec 2024 10:47) (91% - 100%)    Physical Exam:  Constitutional: alert and oriented, in no acute distress   Neck: Soft and supple  Respiratory: Clear to auscultation bilaterally  Cardiovascular: Regular rate and rhyhtm  Gastrointestinal: Soft, non-tender to palpation, +bs  Musculoskeletal:  no lower extremity edema bilaterally    Labs:                        8.7    7.21  )-----------( 114      ( 10 Dec 2024 21:15 )             27.9   12-10    139  |  101  |  119.2[H]  ----------------------------<  98  5.0   |  17.0[L]  |  8.53[H]    Ca    9.3      10 Dec 2024 19:30    TPro  7.4  /  Alb  4.2  /  TBili  1.3  /  DBili  x   /  AST  16  /  ALT  9   /  AlkPhos  97  12-10

## 2024-12-12 LAB
ANION GAP SERPL CALC-SCNC: 21 MMOL/L — HIGH (ref 5–17)
BASOPHILS # BLD AUTO: 0.04 K/UL — SIGNIFICANT CHANGE UP (ref 0–0.2)
BASOPHILS NFR BLD AUTO: 0.8 % — SIGNIFICANT CHANGE UP (ref 0–2)
BLD GP AB SCN SERPL QL: SIGNIFICANT CHANGE UP
BUN SERPL-MCNC: 116.2 MG/DL — HIGH (ref 8–20)
CALCIUM SERPL-MCNC: 8.8 MG/DL — SIGNIFICANT CHANGE UP (ref 8.4–10.5)
CHLORIDE SERPL-SCNC: 103 MMOL/L — SIGNIFICANT CHANGE UP (ref 96–108)
CO2 SERPL-SCNC: 17 MMOL/L — LOW (ref 22–29)
CREAT SERPL-MCNC: 8.78 MG/DL — HIGH (ref 0.5–1.3)
CULTURE RESULTS: SIGNIFICANT CHANGE UP
DIR ANTIGLOB POLYSPECIFIC INTERPRETATION: SIGNIFICANT CHANGE UP
EGFR: 6 ML/MIN/1.73M2 — LOW
EOSINOPHIL # BLD AUTO: 0.46 K/UL — SIGNIFICANT CHANGE UP (ref 0–0.5)
EOSINOPHIL NFR BLD AUTO: 9.5 % — HIGH (ref 0–6)
GLUCOSE SERPL-MCNC: 89 MG/DL — SIGNIFICANT CHANGE UP (ref 70–99)
HCT VFR BLD CALC: 23 % — LOW (ref 39–50)
HGB BLD-MCNC: 7.2 G/DL — LOW (ref 13–17)
IMM GRANULOCYTES NFR BLD AUTO: 0.2 % — SIGNIFICANT CHANGE UP (ref 0–0.9)
LYMPHOCYTES # BLD AUTO: 0.7 K/UL — LOW (ref 1–3.3)
LYMPHOCYTES # BLD AUTO: 14.5 % — SIGNIFICANT CHANGE UP (ref 13–44)
MCHC RBC-ENTMCNC: 30.5 PG — SIGNIFICANT CHANGE UP (ref 27–34)
MCHC RBC-ENTMCNC: 31.3 G/DL — LOW (ref 32–36)
MCV RBC AUTO: 97.5 FL — SIGNIFICANT CHANGE UP (ref 80–100)
MONOCYTES # BLD AUTO: 0.44 K/UL — SIGNIFICANT CHANGE UP (ref 0–0.9)
MONOCYTES NFR BLD AUTO: 9.1 % — SIGNIFICANT CHANGE UP (ref 2–14)
NEUTROPHILS # BLD AUTO: 3.18 K/UL — SIGNIFICANT CHANGE UP (ref 1.8–7.4)
NEUTROPHILS NFR BLD AUTO: 65.9 % — SIGNIFICANT CHANGE UP (ref 43–77)
PLATELET # BLD AUTO: 70 K/UL — LOW (ref 150–400)
POTASSIUM SERPL-MCNC: 4.5 MMOL/L — SIGNIFICANT CHANGE UP (ref 3.5–5.3)
POTASSIUM SERPL-SCNC: 4.5 MMOL/L — SIGNIFICANT CHANGE UP (ref 3.5–5.3)
RBC # BLD: 2.36 M/UL — LOW (ref 4.2–5.8)
RBC # FLD: 15.9 % — HIGH (ref 10.3–14.5)
SODIUM SERPL-SCNC: 141 MMOL/L — SIGNIFICANT CHANGE UP (ref 135–145)
SPECIMEN SOURCE: SIGNIFICANT CHANGE UP
WBC # BLD: 4.83 K/UL — SIGNIFICANT CHANGE UP (ref 3.8–10.5)
WBC # FLD AUTO: 4.83 K/UL — SIGNIFICANT CHANGE UP (ref 3.8–10.5)

## 2024-12-12 PROCEDURE — 86077 PHYS BLOOD BANK SERV XMATCH: CPT

## 2024-12-12 PROCEDURE — 99233 SBSQ HOSP IP/OBS HIGH 50: CPT | Mod: GC

## 2024-12-12 RX ORDER — ERYTHROPOIETIN 3000 [IU]/ML
10000 INJECTION, SOLUTION INTRAVENOUS; SUBCUTANEOUS
Refills: 0 | Status: DISCONTINUED | OUTPATIENT
Start: 2024-12-13 | End: 2024-12-14

## 2024-12-12 RX ORDER — CARVEDILOL 25 MG/1
25 TABLET, FILM COATED ORAL EVERY 12 HOURS
Refills: 0 | Status: DISCONTINUED | OUTPATIENT
Start: 2024-12-12 | End: 2024-12-14

## 2024-12-12 RX ORDER — CARVEDILOL 25 MG/1
12.5 TABLET, FILM COATED ORAL ONCE
Refills: 0 | Status: COMPLETED | OUTPATIENT
Start: 2024-12-12 | End: 2024-12-12

## 2024-12-12 RX ORDER — CHLORHEXIDINE GLUCONATE 1.2 MG/ML
1 RINSE ORAL DAILY
Refills: 0 | Status: DISCONTINUED | OUTPATIENT
Start: 2024-12-12 | End: 2024-12-14

## 2024-12-12 RX ORDER — ACETAMINOPHEN 500MG 500 MG/1
650 TABLET, COATED ORAL EVERY 6 HOURS
Refills: 0 | Status: DISCONTINUED | OUTPATIENT
Start: 2024-12-12 | End: 2024-12-14

## 2024-12-12 RX ORDER — SEVELAMER CARBONATE 800 MG/1
800 TABLET, FILM COATED ORAL
Refills: 0 | Status: DISCONTINUED | OUTPATIENT
Start: 2024-12-12 | End: 2024-12-13

## 2024-12-12 RX ADMIN — CEFPODOXIME PROXETIL 200 MILLIGRAM(S): 100 GRANULE, FOR SUSPENSION ORAL at 11:36

## 2024-12-12 RX ADMIN — CARVEDILOL 25 MILLIGRAM(S): 25 TABLET, FILM COATED ORAL at 17:50

## 2024-12-12 RX ADMIN — LEVETIRACETAM 500 MILLIGRAM(S): 1000 TABLET ORAL at 05:10

## 2024-12-12 RX ADMIN — CHLORHEXIDINE GLUCONATE 1 APPLICATION(S): 1.2 RINSE ORAL at 11:35

## 2024-12-12 RX ADMIN — Medication 1 TABLET(S): at 11:36

## 2024-12-12 RX ADMIN — TAMSULOSIN HYDROCHLORIDE 0.4 MILLIGRAM(S): 0.4 CAPSULE ORAL at 22:36

## 2024-12-12 RX ADMIN — Medication 40 MILLIGRAM(S): at 22:35

## 2024-12-12 RX ADMIN — HYDROMORPHONE HYDROCHLORIDE 0.5 MILLIGRAM(S): 2 TABLET ORAL at 10:32

## 2024-12-12 RX ADMIN — Medication 81 MILLIGRAM(S): at 11:36

## 2024-12-12 RX ADMIN — SEVELAMER CARBONATE 800 MILLIGRAM(S): 800 TABLET, FILM COATED ORAL at 13:51

## 2024-12-12 RX ADMIN — METRONIDAZOLE 500 MILLIGRAM(S): 500 TABLET ORAL at 05:11

## 2024-12-12 RX ADMIN — Medication 5000 UNIT(S): at 17:49

## 2024-12-12 RX ADMIN — HYDROMORPHONE HYDROCHLORIDE 0.5 MILLIGRAM(S): 2 TABLET ORAL at 16:46

## 2024-12-12 RX ADMIN — HYDROMORPHONE HYDROCHLORIDE 0.5 MILLIGRAM(S): 2 TABLET ORAL at 11:00

## 2024-12-12 RX ADMIN — Medication 20 MILLIGRAM(S): at 05:11

## 2024-12-12 RX ADMIN — METRONIDAZOLE 500 MILLIGRAM(S): 500 TABLET ORAL at 13:57

## 2024-12-12 RX ADMIN — AMLODIPINE BESYLATE 5 MILLIGRAM(S): 10 TABLET ORAL at 05:10

## 2024-12-12 RX ADMIN — Medication 5000 UNIT(S): at 05:12

## 2024-12-12 RX ADMIN — CLONIDINE HYDROCHLORIDE 0.1 MILLIGRAM(S): 0.3 TABLET ORAL at 05:09

## 2024-12-12 RX ADMIN — Medication 60 MILLIGRAM(S): at 11:35

## 2024-12-12 RX ADMIN — HYDROMORPHONE HYDROCHLORIDE 0.5 MILLIGRAM(S): 2 TABLET ORAL at 23:52

## 2024-12-12 RX ADMIN — HYDRALAZINE HYDROCHLORIDE 100 MILLIGRAM(S): 10 TABLET ORAL at 05:10

## 2024-12-12 RX ADMIN — CARVEDILOL 12.5 MILLIGRAM(S): 25 TABLET, FILM COATED ORAL at 10:31

## 2024-12-12 RX ADMIN — CARVEDILOL 12.5 MILLIGRAM(S): 25 TABLET, FILM COATED ORAL at 05:10

## 2024-12-12 RX ADMIN — HYDROMORPHONE HYDROCHLORIDE 0.5 MILLIGRAM(S): 2 TABLET ORAL at 05:12

## 2024-12-12 RX ADMIN — SEVELAMER CARBONATE 800 MILLIGRAM(S): 800 TABLET, FILM COATED ORAL at 16:48

## 2024-12-12 RX ADMIN — HYDROMORPHONE HYDROCHLORIDE 0.5 MILLIGRAM(S): 2 TABLET ORAL at 22:52

## 2024-12-12 RX ADMIN — HYDROMORPHONE HYDROCHLORIDE 0.5 MILLIGRAM(S): 2 TABLET ORAL at 04:12

## 2024-12-12 RX ADMIN — HYDROMORPHONE HYDROCHLORIDE 0.5 MILLIGRAM(S): 2 TABLET ORAL at 17:20

## 2024-12-12 RX ADMIN — LEVETIRACETAM 500 MILLIGRAM(S): 1000 TABLET ORAL at 17:49

## 2024-12-12 RX ADMIN — METRONIDAZOLE 500 MILLIGRAM(S): 500 TABLET ORAL at 22:36

## 2024-12-12 RX ADMIN — PANTOPRAZOLE SODIUM 40 MILLIGRAM(S): 40 TABLET, DELAYED RELEASE ORAL at 17:49

## 2024-12-12 RX ADMIN — PANTOPRAZOLE SODIUM 40 MILLIGRAM(S): 40 TABLET, DELAYED RELEASE ORAL at 05:12

## 2024-12-12 RX ADMIN — HYDRALAZINE HYDROCHLORIDE 100 MILLIGRAM(S): 10 TABLET ORAL at 13:57

## 2024-12-12 NOTE — CONSULT NOTE ADULT - ASSESSMENT
ESRD - Some PVC on CXR  HD TTS  in [facility]  REFUSING additional HD today   I discussed the dangers of missing HD with him many times   Says he will go tomorrow   Consent signed     Anemia - Recent colonoscopy few weeks ago   Hgb noted   Will provide Retacrit with HD   [employer] in am       HTN - Resumed meds     RO - Renvela with meals     Will follow ESRD - Some PVC on CXR  HD TTS  in [facility]  REFUSING additional HD today   I discussed the dangers of missing HD with him many times   Says he will go tomorrow   Consent signed   Add one liter fluid restrict to diet     Anemia - Recent colonoscopy few weeks ago   Hgb noted   Will provide Retacrit with HD   [employer] in am       HTN - Resumed meds     RO - Renvela with meals     Will follow

## 2024-12-12 NOTE — PROGRESS NOTE ADULT - ASSESSMENT
65 y/o M w/ hx of Afib, s/p watchman, no longer on AC, ESRD on HD M/W/F via left UE AFV, chronic anemia, CAD s/p CABG, aortic & mitral valve replacement in 11/20 with Dr. Butler, aortic dissection s/p repair, ischemic bowel s/p resection, seizure disorder on Keppra, IE of MV with MSSA and s/p course of Ancef with f/u MUSTAPHA negative for vegetation in 23' admitted with volume overload, uremic gastropathy from missing HD and non-compliance with meds.    #ESRD  - Telemetry monitoring  - Lasix 80mg IV given with good response  - Torsemide 20mg daily  - Dialysis MWF    #Afib:  - S/p Watchman  - Aspirin 81mg daily  - Coreg 12.5mg q12h    #CAD s/p CABG  - Denies CP/Anginal symptoms  - Troponin chronically elevated from ESRD  - Aspirin 81mg daily  - Lipitor 40mg nightly    #Chronic anemia  - Hb at baseline  - FA/Nephrovite     #Seizure disorder:  - No recent seizures reported  - Keppra 500mg BID    #Recent Bacteroides bacteremia:  - Blood Cx cleared before left AMA  - ID notes reviewed  - Follow blood culture in process - NGTD  - Cont. Flagyl/Vantin renal dose    Dispo: Pending clinical course 63 y/o M w/ hx of Afib, s/p watchman, no longer on AC, ESRD on HD TThSa via left UE AFV, chronic anemia, CAD s/p CABG, aortic & mitral valve replacement in 11/20 with Dr. Butler, aortic dissection s/p repair, ischemic bowel s/p resection, seizure disorder on Keppra, IE of MV with MSSA and s/p course of Ancef with f/u MUSTAPHA negative for vegetation in 23' admitted with volume overload, uremic gastropathy from missing HD and non-compliance with meds.    #ESRD  - Telemetry monitoring  - Lasix 80mg IV given with good response  - Torsemide 20mg daily  - Dialysis TThSa    #Afib:  - S/p Watchman  - Aspirin 81mg daily  - Coreg 12.5mg q12h    #CAD s/p CABG  - Denies CP/Anginal symptoms  - Troponin chronically elevated from ESRD  - Aspirin 81mg daily  - Lipitor 40mg nightly    #Chronic anemia  - Hb at baseline  - FA/Nephrovite     #Seizure disorder:  - No recent seizures reported  - Keppra 500mg BID    #Recent Bacteroides bacteremia:  - Blood Cx cleared before left AMA  - ID notes reviewed  - Follow blood culture in process - NGTD  - Cont. Flagyl/Vantin renal dose    Dispo: Pending clinical course 65 y/o M w/ hx of Afib, s/p watchman, no longer on AC, ESRD on HD TThSa via left UE AFV, chronic anemia, CAD s/p CABG, aortic & mitral valve replacement in 11/20 with Dr. Butler, aortic dissection s/p repair, ischemic bowel s/p resection, seizure disorder on Keppra, IE of MV with MSSA and s/p course of Ancef with f/u MUSTAPHA negative for vegetation in 23' admitted with volume overload, uremic gastropathy from missing HD and non-compliance with meds.    #ESRD  - Telemetry monitoring  - Lasix 80mg IV given with good response  - Torsemide 20mg daily  - Dialysis TThSa    #Afib:  - S/p Watchman  - Aspirin 81mg daily  - Coreg 25mg q12h    #CAD s/p CABG  - Denies CP/Anginal symptoms  - Troponin chronically elevated from ESRD  - Aspirin 81mg daily  - Lipitor 40mg nightly    #Chronic anemia  - Hb at baseline  - FA/Nephrovite     #Seizure disorder:  - No recent seizures reported  - Keppra 500mg BID    #Recent Bacteroides bacteremia:  - Blood Cx cleared before left AMA  - ID notes reviewed  - Follow blood culture in process - NGTD  - Cont. Flagyl/Vantin renal dose    Dispo: Pending clinical course 63 y/o M w/ hx of Afib, s/p watchman, no longer on AC, ESRD on HD TThSa via left UE AFV, chronic anemia, CAD s/p CABG, aortic & mitral valve replacement in 11/20 with Dr. Butler, aortic dissection s/p repair, ischemic bowel s/p resection, seizure disorder on Keppra, IE of MV with MSSA and s/p course of Ancef with f/u MUSTAPHA negative for vegetation in 23' admitted with volume overload, uremic gastropathy from missing HD and non-compliance with meds.    #ESRD on HD  - Telemetry monitoring  - Lasix 80mg IV given with good response  - Torsemide 20mg daily put on hold today  - Dialysis TThSa    #Afib:  - S/p Watchman  - Aspirin 81mg daily  - Coreg 25mg q12h (incrsed dose)    #CAD s/p CABG  - Denies CP/Anginal symptoms  - Troponin chronically elevated from ESRD  - Aspirin 81mg daily  - Lipitor 40mg nightly    #Chronic anemia  - Hb at baseline  - FA/Nephrovite     #Seizure disorder:  - No recent seizures reported  - Keppra 500mg BID    #Recent Bacteroides bacteremia:  - Blood Cx cleared before left AMA  - ID notes reviewed  - Follow blood culture in process - NGTD  - Cont. Flagyl/Vantin renal dose    Dispo: Pending clinical course

## 2024-12-12 NOTE — CONSULT NOTE ADULT - SUBJECTIVE AND OBJECTIVE BOX
Patient is a 64y old  Male who presents with a chief complaint of Volume overload   Missed HD x 2 (12 Dec 2024 07:26)      HPI:  63 y/o M w/ hx of Afib, s/p watchman, no longer on AC, ESRD on HD M/W/F via left UE AFV, chronic anemia, CAD s/p CABG, aortic & mitral valve replacement in 11/20 with Dr. Butler, aortic dissection s/p repair, ischemic bowel s/p resection, seizure disorder on Keppra, IE of MV with MSSA and s/p course of Ancef with f/u MUSTAPHA negative for vegetation in 23'. Patient admitted from 11/4 to 11/12/24 after missing HD and needing emergent HD for for HTN urgency/volume overload. Had GIB at that time as well requiring PRBC transfusion and GI eval with colonoscopy showing IH and neovascularization of anastomosis from prior bowel resection. At that time plavix was dcd and Aspirin cont. He was then readmitted from 12/1 to 12/6/24 for volume overload again from missing HD. He was found to have bacteroides bacteremia and was seen by ID and had repeat blood cx which where neg. Patient ultimately left AMA on 12/6 and was called after DC and advised to be on Abx with Vantin/Flagyl which were sent to his Pharmacy. Patient states he lives alone and is normally independent however since leaving AMA he has been weak and not able to get his meds at the Pharmacy, he has also been with epigastric pain and with NBMB vomiting. Patient returns to ED today c/o SOB, and fatigue. Still nauseated but denies vomiting. In the ED noted to be hypertensive, and labs with BUN of 120 and Cr. of 8. Requiring 2 L NC to maintain normal sat. CXR with mild PVC. Given clonidine, lasix, and hydralazine with improvement in BP. ED Placed consult to Nephrology for HD in AM. Patient denies issues with AVF. Denies CP, HA, focal weakness, or recurrent bleeding. Hbg at baseline.      Interim noted   Refuses HD today   He states that he has no pants and does not want to do HD today   Feels better          (11 Dec 2024 04:45)      PAST MEDICAL & SURGICAL HISTORY:  CHF (congestive heart failure)      CVA (cerebral vascular accident)      Seizures  last seizure 10 years ago      HTN (hypertension)      Hyperlipemia      COVID-19  october 2020      Atrial fibrillation      Former smoker      History of cocaine use  remote hx, currently sober      H/O aortic dissection  s/p repair (2010), surgery complicated by bowel ischemia s/p bowel resection and ostomy with reversal      H/O aortic valve insufficiency      Mitral regurgitation      GIB (gastrointestinal bleeding)      CAD (coronary artery disease)  s/p PCI 1998  and CABG x 2 11/2020      Chronic atrial fibrillation      Aneurysm of artery of upper extremity      Anemia of chronic disease      End stage renal disease      Myocardial infarction      COVID-19 vaccine series completed      Port-A-Cath in place      H/O colectomy      Status post double vessel coronary artery bypass  11/2020 Dr Butler      S/P AVR (aortic valve replacement)  (t)AVR 11/2020 Dr Butler      S/P MVR (mitral valve replacement)  (t)MVR 11/2020 Dr Butler      H/O aortic dissection  Type A; emergent repair 2010      AV fistula  LUE      History of renal transplant      Presence of Watchman left atrial appendage closure device          FAMILY HISTORY:  FH: hypertension    Family history of cardiac disorder (Mother)  mother        Social History:    MEDICATIONS  (STANDING):  amLODIPine   Tablet 5 milliGRAM(s) Oral daily  aspirin enteric coated 81 milliGRAM(s) Oral daily  atorvastatin 40 milliGRAM(s) Oral at bedtime  carvedilol 25 milliGRAM(s) Oral every 12 hours  cefpodoxime 200 milliGRAM(s) Oral daily  chlorhexidine 2% Cloths 1 Application(s) Topical daily  cloNIDine 0.1 milliGRAM(s) Oral two times a day  heparin   Injectable 5000 Unit(s) SubCutaneous every 12 hours  hydrALAZINE 100 milliGRAM(s) Oral three times a day  isosorbide   mononitrate ER Tablet (IMDUR) 60 milliGRAM(s) Oral daily  levETIRAcetam 500 milliGRAM(s) Oral two times a day  metroNIDAZOLE    Tablet 500 milliGRAM(s) Oral every 8 hours  Nephro-jeanie 1 Tablet(s) Oral daily  pantoprazole  Injectable 40 milliGRAM(s) IV Push two times a day  tamsulosin 0.4 milliGRAM(s) Oral at bedtime    MEDICATIONS  (PRN):  acetaminophen     Tablet .. 650 milliGRAM(s) Oral every 6 hours PRN Mild Pain (1 - 3)  ALPRAZolam 0.5 milliGRAM(s) Oral every 8 hours PRN anxiety  HYDROmorphone  Injectable 0.5 milliGRAM(s) IV Push every 6 hours PRN Moderate Pain (4 - 6)      Allergies    penicillin (Other)    Intolerances          Vital Signs Last 24 Hrs  T(C): 36.4 (12 Dec 2024 08:42), Max: 36.9 (11 Dec 2024 19:45)  T(F): 97.5 (12 Dec 2024 08:42), Max: 98.4 (11 Dec 2024 19:45)  HR: 122 (12 Dec 2024 08:42) (122 - 127)  BP: 152/77 (12 Dec 2024 08:42) (126/62 - 152/77)  BP(mean): --  RR: 18 (12 Dec 2024 08:42) (16 - 20)  SpO2: 95% (12 Dec 2024 08:42) (93% - 100%)    Parameters below as of 12 Dec 2024 08:42  Patient On (Oxygen Delivery Method): nasal cannula  O2 Flow (L/min): 3    Daily     Daily   I&O's Detail    11 Dec 2024 07:01  -  12 Dec 2024 07:00  --------------------------------------------------------  IN:    Oral Fluid: 240 mL  Total IN: 240 mL    OUT:    Voided (mL): 625 mL  Total OUT: 625 mL    Total NET: -385 mL      12 Dec 2024 07:01  -  12 Dec 2024 12:13  --------------------------------------------------------  IN:    Oral Fluid: 360 mL  Total IN: 360 mL    OUT:    Voided (mL): 600 mL  Total OUT: 600 mL    Total NET: -240 mL        I&O's Summary    11 Dec 2024 07:01  -  12 Dec 2024 07:00  --------------------------------------------------------  IN: 240 mL / OUT: 625 mL / NET: -385 mL    12 Dec 2024 07:01  -  12 Dec 2024 12:13  --------------------------------------------------------  IN: 360 mL / OUT: 600 mL / NET: -240 mL        PHYSICAL EXAM:      GENERAL: NAD,  HEAD:  Atraumatic, Normocephalic , no edema   NECK: Supple, No JVD,   NERVOUS SYSTEM:  Alert & Oriented X3,  CHEST/LUNG: EAE , No wheeze   HEART: Regular rate and rhythm; No gallop or rub   ABDOMEN: Soft, Nontender, Nondistended; Bowel sounds present  EXTREMITIES:  no sig edema . L access w/ thrill     LABS:                        7.2    4.83  )-----------( 70       ( 12 Dec 2024 06:53 )             23.0     12-12    141  |  103  |  116.2[H]  ----------------------------<  89  4.5   |  17.0[L]  |  8.78[H]    Ca    8.8      12 Dec 2024 06:53    TPro  7.4  /  Alb  4.2  /  TBili  1.3  /  DBili  x   /  AST  16  /  ALT  9   /  AlkPhos  97  12-10    PT/INR - ( 10 Dec 2024 21:15 )   PT: 15.4 sec;   INR: 1.37 ratio         PTT - ( 10 Dec 2024 21:15 )  PTT:41.6 sec  Urinalysis Basic - ( 12 Dec 2024 06:53 )    Color: x / Appearance: x / SG: x / pH: x  Gluc: 89 mg/dL / Ketone: x  / Bili: x / Urobili: x   Blood: x / Protein: x / Nitrite: x   Leuk Esterase: x / RBC: x / WBC x   Sq Epi: x / Non Sq Epi: x / Bacteria: x          < from: Xray Chest 1 View- PORTABLE-Urgent (12.10.24 @ 20:16) >    ACC: [id number] EXAM:  XR CHEST PORTABLE URGENT 1V   ORDERED BY: SACHIN VARGAS ABU     PROCEDURE DATE:  12/10/2024          INTERPRETATION:  TECHNIQUE: Single portable view of the chest.    COMPARISON:  12/11/2024    CLINICAL HISTORY: Sepsis    FINDINGS:    Single frontal view of the chest demonstrates small bilateral pleural   effusions. Mild CHF. The cardiomediastinal silhouette is enlarged.   Mediastinal sternotomy wires. No acute osseous abnormalities. Overlying   EKG leads and wires are noted    IMPRESSION: Small bilateral pleural effusions. Mild CHF.    --- End of Report ---            WINTER REYES MD; Attending Radiologist  This d    < end of copied text >      RADIOLOGY & ADDITIONAL TESTS:

## 2024-12-12 NOTE — PROGRESS NOTE ADULT - SUBJECTIVE AND OBJECTIVE BOX
SUBJECTIVE    BRIEF HOSPITAL COURSE: 65 y/o M w/ hx of Afib, s/p watchman, no longer on AC, ESRD on HD M/W/F via left UE AFV, chronic anemia, CAD s/p CABG, aortic & mitral valve replacement, aortic dissection s/p repair, ischemic bowel s/p resection, seizure disorder on Keppra, IE of MV with MSSA and s/p course of Ancef with f/u MUSTAPHA negative for vegetation in 23'.  Patient returns to ED today c/o SOB, and fatigue. Still nauseated but denies vomiting. In the ED noted to be hypertensive, and labs with BUN of 120 and Cr. of 8. Requiring 2 L NC to maintain normal sat. CXR with mild PVC. Given clonidine, lasix, and hydralazine with improvement in BP. ED Placed consult to Nephrology for HD in AM. Patient denies issues with AVF. Denies CP, HA, focal weakness, or recurrent bleeding. Hbg at baseline.      INTERVAL HPI: Patient seen and examined at bedside. No acute overnight events reported. Patient states he is tired but no other complaints. No fever, chills, cough, nausea or vomiting.     PAST MEDICAL & SURGICAL HISTORY:  CHF (congestive heart failure)      CVA (cerebral vascular accident)      Seizures  last seizure 10 years ago      HTN (hypertension)      Hyperlipemia      COVID-19  2020      Atrial fibrillation      Former smoker      History of cocaine use  remote hx, currently sober      H/O aortic dissection  s/p repair (), surgery complicated by bowel ischemia s/p bowel resection and ostomy with reversal      H/O aortic valve insufficiency      Mitral regurgitation      GIB (gastrointestinal bleeding)      CAD (coronary artery disease)  s/p PCI   and CABG x 2 2020      Chronic atrial fibrillation      Aneurysm of artery of upper extremity      Anemia of chronic disease      End stage renal disease      Myocardial infarction      COVID-19 vaccine series completed      Port-A-Cath in place      H/O colectomy      Status post double vessel coronary artery bypass  2020 Dr Butler      S/P AVR (aortic valve replacement)  (t)AVR 2020 Dr Butler      S/P MVR (mitral valve replacement)  (t)MVR 2020 Dr Butler      H/O aortic dissection  Type A; emergent repair       AV fistula  LUE      History of renal transplant      Presence of Watchman left atrial appendage closure device          ROS:  All other systems were reviewed and are negative.    OBJECTIVE    Vital Signs Last 24 Hrs  T(C): 36.4 (12 Dec 2024 04:18), Max: 36.9 (11 Dec 2024 19:45)  T(F): 97.6 (12 Dec 2024 04:18), Max: 98.4 (11 Dec 2024 19:45)  HR: 122 (12 Dec 2024 04:18) (112 - 127)  BP: 149/68 (12 Dec 2024 04:18) (126/62 - 162/91)  BP(mean): --  RR: 18 (12 Dec 2024 04:18) (16 - 20)  SpO2: 98% (12 Dec 2024 04:18) (93% - 100%)    Parameters below as of 12 Dec 2024 04:18  Patient On (Oxygen Delivery Method): nasal cannula  O2 Flow (L/min): 3      PHYSICAL EXAM:  Constitutional: alert and oriented, in no acute distress   Neck: Soft and supple  Respiratory: Clear to auscultation bilaterally  Cardiovascular: Regular rate and rhyhtm  Gastrointestinal: Soft, non-tender to palpation, +bs  Musculoskeletal:  no lower extremity edema bilaterally      MEDICATIONS  (STANDING):  amLODIPine   Tablet 5 milliGRAM(s) Oral daily  aspirin enteric coated 81 milliGRAM(s) Oral daily  atorvastatin 40 milliGRAM(s) Oral at bedtime  carvedilol 12.5 milliGRAM(s) Oral every 12 hours  cefpodoxime 200 milliGRAM(s) Oral daily  cloNIDine 0.1 milliGRAM(s) Oral two times a day  heparin   Injectable 5000 Unit(s) SubCutaneous every 12 hours  hydrALAZINE 100 milliGRAM(s) Oral three times a day  isosorbide   mononitrate ER Tablet (IMDUR) 60 milliGRAM(s) Oral daily  levETIRAcetam 500 milliGRAM(s) Oral two times a day  metroNIDAZOLE    Tablet 500 milliGRAM(s) Oral every 8 hours  Nephro-jeanie 1 Tablet(s) Oral daily  pantoprazole  Injectable 40 milliGRAM(s) IV Push two times a day  tamsulosin 0.4 milliGRAM(s) Oral at bedtime  torsemide 20 milliGRAM(s) Oral daily    MEDICATIONS  (PRN):  acetaminophen     Tablet .. 650 milliGRAM(s) Oral every 6 hours PRN Mild Pain (1 - 3)  ALPRAZolam 0.5 milliGRAM(s) Oral every 8 hours PRN anxiety  HYDROmorphone  Injectable 0.5 milliGRAM(s) IV Push every 6 hours PRN Moderate Pain (4 - 6)    Allergies    penicillin (Other)    Intolerances        LABS:                        8.7    7.21  )-----------( 114      ( 10 Dec 2024 21:15 )             27.9     12-10    139  |  101  |  119.2[H]  ----------------------------<  98  5.0   |  17.0[L]  |  8.53[H]    Ca    9.3      10 Dec 2024 19:30    TPro  7.4  /  Alb  4.2  /  TBili  1.3  /  DBili  x   /  AST  16  /  ALT  9   /  AlkPhos  97  12-10    PT/INR - ( 10 Dec 2024 21:15 )   PT: 15.4 sec;   INR: 1.37 ratio         PTT - ( 10 Dec 2024 21:15 )  PTT:41.6 sec  Urinalysis Basic - ( 11 Dec 2024 03:48 )    Color: Yellow / Appearance: Clear / S.012 / pH: x  Gluc: x / Ketone: Negative mg/dL  / Bili: Negative / Urobili: 0.2 mg/dL   Blood: x / Protein: 100 mg/dL / Nitrite: Negative   Leuk Esterase: Negative / RBC: 2 /HPF / WBC 0 /HPF   Sq Epi: x / Non Sq Epi: 0 /HPF / Bacteria: Negative /HPF      CAPILLARY BLOOD GLUCOSE          CULTURE DATA:    Culture - Blood (collected 12-10-24 @ 19:30)  Source: .Blood BLOOD  Preliminary Report (24 @ 02:02):    No growth at 24 hours    Culture - Blood (collected 12-10-24 @ 21:15)  Source: .Blood BLOOD  Preliminary Report (24 @ 02:02):    No growth at 24 hours        RADIOLOGY & ADDITIONAL TESTS: SUBJECTIVE    BRIEF HOSPITAL COURSE: 63 y/o M w/ hx of Afib, s/p watchman, no longer on AC, ESRD on HD T// via left UE AFV, chronic anemia, CAD s/p CABG, aortic & mitral valve replacement, aortic dissection s/p repair, ischemic bowel s/p resection, seizure disorder on Keppra, IE of MV with MSSA and s/p course of Ancef with f/u MUSTAPHA negative for vegetation in '.  Patient returns to ED today c/o SOB, and fatigue. Still nauseated but denies vomiting. In the ED noted to be hypertensive, and labs with BUN of 120 and Cr. of 8. Requiring 2 L NC to maintain normal sat. CXR with mild PVC. Given clonidine, lasix, and hydralazine with improvement in BP. ED Placed consult to Nephrology for HD in AM. Patient denies issues with AVF. Denies CP, HA, focal weakness, or recurrent bleeding. Hbg at baseline.      INTERVAL HPI: Patient seen and examined at bedside. No acute overnight events reported. Patient states he is tired but no other complaints. No fever, chills, cough, nausea or vomiting.     PAST MEDICAL & SURGICAL HISTORY:  CHF (congestive heart failure)      CVA (cerebral vascular accident)      Seizures  last seizure 10 years ago      HTN (hypertension)      Hyperlipemia      COVID-19  2020      Atrial fibrillation      Former smoker      History of cocaine use  remote hx, currently sober      H/O aortic dissection  s/p repair (), surgery complicated by bowel ischemia s/p bowel resection and ostomy with reversal      H/O aortic valve insufficiency      Mitral regurgitation      GIB (gastrointestinal bleeding)      CAD (coronary artery disease)  s/p PCI   and CABG x 2 2020      Chronic atrial fibrillation      Aneurysm of artery of upper extremity      Anemia of chronic disease      End stage renal disease      Myocardial infarction      COVID-19 vaccine series completed      Port-A-Cath in place      H/O colectomy      Status post double vessel coronary artery bypass  2020 Dr Butler      S/P AVR (aortic valve replacement)  (t)AVR 2020 Dr Butler      S/P MVR (mitral valve replacement)  (t)MVR 2020 Dr Butler      H/O aortic dissection  Type A; emergent repair       AV fistula  LUE      History of renal transplant      Presence of Watchman left atrial appendage closure device          ROS:  All other systems were reviewed and are negative.    OBJECTIVE    Vital Signs Last 24 Hrs  T(C): 36.4 (12 Dec 2024 04:18), Max: 36.9 (11 Dec 2024 19:45)  T(F): 97.6 (12 Dec 2024 04:18), Max: 98.4 (11 Dec 2024 19:45)  HR: 122 (12 Dec 2024 04:18) (112 - 127)  BP: 149/68 (12 Dec 2024 04:18) (126/62 - 162/91)  BP(mean): --  RR: 18 (12 Dec 2024 04:18) (16 - 20)  SpO2: 98% (12 Dec 2024 04:18) (93% - 100%)    Parameters below as of 12 Dec 2024 04:18  Patient On (Oxygen Delivery Method): nasal cannula  O2 Flow (L/min): 3      PHYSICAL EXAM:  Constitutional: alert and oriented, in no acute distress   Neck: Soft and supple  Respiratory: Clear to auscultation bilaterally  Cardiovascular: Regular rate and rhyhtm  Gastrointestinal: Soft, non-tender to palpation, +bs  Musculoskeletal:  no lower extremity edema bilaterally      MEDICATIONS  (STANDING):  amLODIPine   Tablet 5 milliGRAM(s) Oral daily  aspirin enteric coated 81 milliGRAM(s) Oral daily  atorvastatin 40 milliGRAM(s) Oral at bedtime  carvedilol 12.5 milliGRAM(s) Oral every 12 hours  cefpodoxime 200 milliGRAM(s) Oral daily  cloNIDine 0.1 milliGRAM(s) Oral two times a day  heparin   Injectable 5000 Unit(s) SubCutaneous every 12 hours  hydrALAZINE 100 milliGRAM(s) Oral three times a day  isosorbide   mononitrate ER Tablet (IMDUR) 60 milliGRAM(s) Oral daily  levETIRAcetam 500 milliGRAM(s) Oral two times a day  metroNIDAZOLE    Tablet 500 milliGRAM(s) Oral every 8 hours  Nephro-jeanie 1 Tablet(s) Oral daily  pantoprazole  Injectable 40 milliGRAM(s) IV Push two times a day  tamsulosin 0.4 milliGRAM(s) Oral at bedtime  torsemide 20 milliGRAM(s) Oral daily    MEDICATIONS  (PRN):  acetaminophen     Tablet .. 650 milliGRAM(s) Oral every 6 hours PRN Mild Pain (1 - 3)  ALPRAZolam 0.5 milliGRAM(s) Oral every 8 hours PRN anxiety  HYDROmorphone  Injectable 0.5 milliGRAM(s) IV Push every 6 hours PRN Moderate Pain (4 - 6)    Allergies    penicillin (Other)    Intolerances        LABS:                        8.7    7.21  )-----------( 114      ( 10 Dec 2024 21:15 )             27.9     12-10    139  |  101  |  119.2[H]  ----------------------------<  98  5.0   |  17.0[L]  |  8.53[H]    Ca    9.3      10 Dec 2024 19:30    TPro  7.4  /  Alb  4.2  /  TBili  1.3  /  DBili  x   /  AST  16  /  ALT  9   /  AlkPhos  97  12-10    PT/INR - ( 10 Dec 2024 21:15 )   PT: 15.4 sec;   INR: 1.37 ratio         PTT - ( 10 Dec 2024 21:15 )  PTT:41.6 sec  Urinalysis Basic - ( 11 Dec 2024 03:48 )    Color: Yellow / Appearance: Clear / S.012 / pH: x  Gluc: x / Ketone: Negative mg/dL  / Bili: Negative / Urobili: 0.2 mg/dL   Blood: x / Protein: 100 mg/dL / Nitrite: Negative   Leuk Esterase: Negative / RBC: 2 /HPF / WBC 0 /HPF   Sq Epi: x / Non Sq Epi: 0 /HPF / Bacteria: Negative /HPF      CAPILLARY BLOOD GLUCOSE          CULTURE DATA:    Culture - Blood (collected 12-10-24 @ 19:30)  Source: .Blood BLOOD  Preliminary Report (24 @ 02:02):    No growth at 24 hours    Culture - Blood (collected 12-10-24 @ 21:15)  Source: .Blood BLOOD  Preliminary Report (24 @ 02:02):    No growth at 24 hours        RADIOLOGY & ADDITIONAL TESTS: SUBJECTIVE    BRIEF HOSPITAL COURSE: 63 y/o M w/ hx of Afib, s/p watchman, no longer on AC, ESRD on HD T// via left UE AFV, chronic anemia, CAD s/p CABG, aortic & mitral valve replacement, aortic dissection s/p repair, ischemic bowel s/p resection, seizure disorder on Keppra, IE of MV with MSSA and s/p course of Ancef with f/u MUSTAPHA negative for vegetation in '.  Patient returns to ED today c/o SOB, and fatigue. Still nauseated but denies vomiting. In the ED noted to be hypertensive, and labs with BUN of 120 and Cr. of 8. Requiring 2 L NC to maintain normal sat. CXR with mild PVC. Given clonidine, Lasix and hydralazine with improvement in BP. ED Placed consult to Nephrology for HD in AM. Patient denies issues with AVF. Denies CP, HA, focal weakness, or recurrent bleeding. Hbg at baseline.      INTERVAL HPI: Patient seen and examined at bedside. No acute overnight events reported. Patient states he is tired but no other complaints. No fever, chills, cough, nausea or vomiting.     PAST MEDICAL & SURGICAL HISTORY:  CHF (congestive heart failure)  CVA (cerebral vascular accident)  Seizures  last seizure 10 years ago  HTN (hypertension)  Hyperlipemia  COVID-19  2020  Atrial fibrillation  Former smoker  History of cocaine use  remote hx, currently sober  H/O aortic dissection  s/p repair (), surgery complicated by bowel ischemia s/p bowel resection and ostomy with reversal  H/O aortic valve insufficiency  Mitral regurgitation  GIB (gastrointestinal bleeding)  CAD (coronary artery disease)  s/p PCI   and CABG x 2 2020  Chronic atrial fibrillation  Aneurysm of artery of upper extremity  Anemia of chronic disease  End stage renal disease  Myocardial infarction  COVID-19 vaccine series completed  Port-A-Cath in place  H/O colectomy  Status post double vessel coronary artery bypass  2020 Dr Butler  S/P AVR (aortic valve replacement)  (t)AVR 2020 Dr Butler  S/P MVR (mitral valve replacement)  (t)MVR 2020 Dr Butler  H/O aortic dissection  Type A; emergent repair   AV fistula  LUE  History of renal transplant  Presence of Watchman left atrial appendage closure device      ROS:  All other systems were reviewed and are negative.    OBJECTIVE    Vital Signs Last 24 Hrs  T(C): 36.4 (12 Dec 2024 04:18), Max: 36.9 (11 Dec 2024 19:45)  T(F): 97.6 (12 Dec 2024 04:18), Max: 98.4 (11 Dec 2024 19:45)  HR: 122 (12 Dec 2024 04:18) (112 - 127)  BP: 149/68 (12 Dec 2024 04:18) (126/62 - 162/91)  BP(mean): --  RR: 18 (12 Dec 2024 04:18) (16 - 20)  SpO2: 98% (12 Dec 2024 04:18) (93% - 100%)    Parameters below as of 12 Dec 2024 04:18  Patient On (Oxygen Delivery Method): nasal cannula  O2 Flow (L/min): 3      PHYSICAL EXAM:  Constitutional: alert and oriented, in no acute distress   Neck: Soft and supple  Respiratory: Clear to auscultation bilaterally  Cardiovascular: Regular rate and rhyhtm  Gastrointestinal: Soft, non-tender to palpation, +bs  Musculoskeletal:  no lower extremity edema bilaterally      MEDICATIONS  (STANDING):  amLODIPine   Tablet 5 milliGRAM(s) Oral daily  aspirin enteric coated 81 milliGRAM(s) Oral daily  atorvastatin 40 milliGRAM(s) Oral at bedtime  carvedilol 12.5 milliGRAM(s) Oral every 12 hours  cefpodoxime 200 milliGRAM(s) Oral daily  cloNIDine 0.1 milliGRAM(s) Oral two times a day  heparin   Injectable 5000 Unit(s) SubCutaneous every 12 hours  hydrALAZINE 100 milliGRAM(s) Oral three times a day  isosorbide   mononitrate ER Tablet (IMDUR) 60 milliGRAM(s) Oral daily  levETIRAcetam 500 milliGRAM(s) Oral two times a day  metroNIDAZOLE    Tablet 500 milliGRAM(s) Oral every 8 hours  Nephro-jeanie 1 Tablet(s) Oral daily  pantoprazole  Injectable 40 milliGRAM(s) IV Push two times a day  tamsulosin 0.4 milliGRAM(s) Oral at bedtime  torsemide 20 milliGRAM(s) Oral daily    MEDICATIONS  (PRN):  acetaminophen     Tablet .. 650 milliGRAM(s) Oral every 6 hours PRN Mild Pain (1 - 3)  ALPRAZolam 0.5 milliGRAM(s) Oral every 8 hours PRN anxiety  HYDROmorphone  Injectable 0.5 milliGRAM(s) IV Push every 6 hours PRN Moderate Pain (4 - 6)    Allergies    penicillin (Other)    Intolerances        LABS:                        8.7    7.21  )-----------( 114      ( 10 Dec 2024 21:15 )             27.9     12-10    139  |  101  |  119.2[H]  ----------------------------<  98  5.0   |  17.0[L]  |  8.53[H]    Ca    9.3      10 Dec 2024 19:30    TPro  7.4  /  Alb  4.2  /  TBili  1.3  /  DBili  x   /  AST  16  /  ALT  9   /  AlkPhos  97  12-10    PT/INR - ( 10 Dec 2024 21:15 )   PT: 15.4 sec;   INR: 1.37 ratio         PTT - ( 10 Dec 2024 21:15 )  PTT:41.6 sec  Urinalysis Basic - ( 11 Dec 2024 03:48 )    Color: Yellow / Appearance: Clear / S.012 / pH: x  Gluc: x / Ketone: Negative mg/dL  / Bili: Negative / Urobili: 0.2 mg/dL   Blood: x / Protein: 100 mg/dL / Nitrite: Negative   Leuk Esterase: Negative / RBC: 2 /HPF / WBC 0 /HPF   Sq Epi: x / Non Sq Epi: 0 /HPF / Bacteria: Negative /HPF      CAPILLARY BLOOD GLUCOSE      CULTURE DATA:    Culture - Blood (collected 12-10-24 @ 19:30)  Source: .Blood BLOOD  Preliminary Report (24 @ 02:02):    No growth at 24 hours    Culture - Blood (collected 12-10-24 @ 21:15)  Source: .Blood BLOOD  Preliminary Report (24 @ 02:02):    No growth at 24 hours        RADIOLOGY  Reviewed myself

## 2024-12-12 NOTE — PROGRESS NOTE ADULT - ATTENDING COMMENTS
65 y/o M w/ hx of Afib, s/p watchman, no longer on AC, ESRD on HD T/Th/Sa via left UE AFV, chronic anemia, CAD s/p CABG, aortic & mitral valve replacement, aortic dissection s/p repair, ischemic bowel s/p resection, seizure disorder on Keppra, IE of MV with MSSA and s/p course of Ancef with f/u MUSTAPHA negative for vegetation in 23'.    Admitted with missing HD    lying in bed  states he soiled himself- no diarrhea, normal stools  so he is without undergarments and doesn't want to go to HD today  then c/p HA asking for pain meds. In ED Dilaudid was given.  explained no Dilaudid. will get Tylenol and has to get HD to help with HA. is sec to Uremia    ESRD on HD TTS  clinically appears dry  hold torsemide  Renal consulted today for HD    Afib RVR in 120s  sec to dehydration vs Uremia vs missing HD  increase coreg to 25 bid  monitor for response.    AOCD  Epo    There is no e/o volume overload  on room air  reviewed CXR, similar to prior 65 y/o M w/ hx of Afib, s/p watchman, no longer on AC, ESRD on HD T/Th/Sa via left UE AFV, chronic anemia, CAD s/p CABG, aortic & mitral valve replacement, aortic dissection s/p repair, ischemic bowel s/p resection, seizure disorder on Keppra, IE of MV with MSSA and s/p course of Ancef with f/u MUSTAPHA negative for vegetation in 23'.    Admitted with missing HD    lying in bed  states he soiled himself- no diarrhea, normal stools  so he is without undergarments and doesn't want to go to HD today  then c/p HA asking for pain meds. In ED Dilaudid was given.  explained no Dilaudid. will get Tylenol and has to get HD to help with HA. is sec to Uremia    ESRD on HD TTS  clinically appears dry  hold torsemide  Renal consulted today for HD    Afib RVR in 120s  sec to dehydration vs Uremia vs missing HD  increase coreg to 25 bid  monitor for response.    AOCD  Epo    There is no e/o volume overload  on room air  reviewed CXR, similar to prior CXRs    he had left AMA previously  so rpt c/s were sent on admission  and are NGTD.    dispo- lives alone. to home  post HD tomorrow

## 2024-12-13 ENCOUNTER — TRANSCRIPTION ENCOUNTER (OUTPATIENT)
Age: 64
End: 2024-12-13

## 2024-12-13 LAB
ANION GAP SERPL CALC-SCNC: 19 MMOL/L — HIGH (ref 5–17)
BUN SERPL-MCNC: 110.5 MG/DL — HIGH (ref 8–20)
CALCIUM SERPL-MCNC: 8.6 MG/DL — SIGNIFICANT CHANGE UP (ref 8.4–10.5)
CHLORIDE SERPL-SCNC: 103 MMOL/L — SIGNIFICANT CHANGE UP (ref 96–108)
CO2 SERPL-SCNC: 18 MMOL/L — LOW (ref 22–29)
CREAT SERPL-MCNC: 9.33 MG/DL — HIGH (ref 0.5–1.3)
EGFR: 6 ML/MIN/1.73M2 — LOW
FERRITIN SERPL-MCNC: 928 NG/ML — HIGH (ref 30–400)
GLUCOSE SERPL-MCNC: 96 MG/DL — SIGNIFICANT CHANGE UP (ref 70–99)
HCT VFR BLD CALC: 22.8 % — LOW (ref 39–50)
HGB BLD-MCNC: 7.1 G/DL — LOW (ref 13–17)
IRON SATN MFR SERPL: 27 % — SIGNIFICANT CHANGE UP (ref 16–55)
IRON SATN MFR SERPL: 62 UG/DL — SIGNIFICANT CHANGE UP (ref 59–158)
MAGNESIUM SERPL-MCNC: 2.3 MG/DL — SIGNIFICANT CHANGE UP (ref 1.6–2.6)
MCHC RBC-ENTMCNC: 30.3 PG — SIGNIFICANT CHANGE UP (ref 27–34)
MCHC RBC-ENTMCNC: 31.1 G/DL — LOW (ref 32–36)
MCV RBC AUTO: 97.4 FL — SIGNIFICANT CHANGE UP (ref 80–100)
PHOSPHATE SERPL-MCNC: 7.4 MG/DL — HIGH (ref 2.4–4.7)
PLATELET # BLD AUTO: 57 K/UL — LOW (ref 150–400)
POTASSIUM SERPL-MCNC: 4.2 MMOL/L — SIGNIFICANT CHANGE UP (ref 3.5–5.3)
POTASSIUM SERPL-SCNC: 4.2 MMOL/L — SIGNIFICANT CHANGE UP (ref 3.5–5.3)
RBC # BLD: 2.34 M/UL — LOW (ref 4.2–5.8)
RBC # FLD: 15.7 % — HIGH (ref 10.3–14.5)
SODIUM SERPL-SCNC: 140 MMOL/L — SIGNIFICANT CHANGE UP (ref 135–145)
TIBC SERPL-MCNC: 233 UG/DL — SIGNIFICANT CHANGE UP (ref 220–430)
TRANSFERRIN SERPL-MCNC: 163 MG/DL — LOW (ref 180–329)
WBC # BLD: 3.87 K/UL — SIGNIFICANT CHANGE UP (ref 3.8–10.5)
WBC # FLD AUTO: 3.87 K/UL — SIGNIFICANT CHANGE UP (ref 3.8–10.5)

## 2024-12-13 PROCEDURE — 86079 PHYS BLOOD BANK SERV AUTHRJ: CPT

## 2024-12-13 PROCEDURE — 99233 SBSQ HOSP IP/OBS HIGH 50: CPT | Mod: GC

## 2024-12-13 RX ORDER — SEVELAMER CARBONATE 800 MG/1
1600 TABLET, FILM COATED ORAL
Refills: 0 | Status: DISCONTINUED | OUTPATIENT
Start: 2024-12-13 | End: 2024-12-14

## 2024-12-13 RX ADMIN — HYDROMORPHONE HYDROCHLORIDE 0.5 MILLIGRAM(S): 2 TABLET ORAL at 06:07

## 2024-12-13 RX ADMIN — HYDRALAZINE HYDROCHLORIDE 100 MILLIGRAM(S): 10 TABLET ORAL at 13:25

## 2024-12-13 RX ADMIN — HYDROMORPHONE HYDROCHLORIDE 0.5 MILLIGRAM(S): 2 TABLET ORAL at 05:07

## 2024-12-13 RX ADMIN — LEVETIRACETAM 500 MILLIGRAM(S): 1000 TABLET ORAL at 17:26

## 2024-12-13 RX ADMIN — Medication 5000 UNIT(S): at 05:07

## 2024-12-13 RX ADMIN — CLONIDINE HYDROCHLORIDE 0.1 MILLIGRAM(S): 0.3 TABLET ORAL at 17:26

## 2024-12-13 RX ADMIN — PANTOPRAZOLE SODIUM 40 MILLIGRAM(S): 40 TABLET, DELAYED RELEASE ORAL at 05:07

## 2024-12-13 RX ADMIN — CARVEDILOL 25 MILLIGRAM(S): 25 TABLET, FILM COATED ORAL at 17:26

## 2024-12-13 RX ADMIN — CEFPODOXIME PROXETIL 200 MILLIGRAM(S): 100 GRANULE, FOR SUSPENSION ORAL at 13:24

## 2024-12-13 RX ADMIN — Medication 5000 UNIT(S): at 17:27

## 2024-12-13 RX ADMIN — HYDROMORPHONE HYDROCHLORIDE 0.5 MILLIGRAM(S): 2 TABLET ORAL at 23:46

## 2024-12-13 RX ADMIN — Medication 40 MILLIGRAM(S): at 21:43

## 2024-12-13 RX ADMIN — METRONIDAZOLE 500 MILLIGRAM(S): 500 TABLET ORAL at 05:06

## 2024-12-13 RX ADMIN — LEVETIRACETAM 500 MILLIGRAM(S): 1000 TABLET ORAL at 05:06

## 2024-12-13 RX ADMIN — Medication 1 TABLET(S): at 13:25

## 2024-12-13 RX ADMIN — TAMSULOSIN HYDROCHLORIDE 0.4 MILLIGRAM(S): 0.4 CAPSULE ORAL at 21:44

## 2024-12-13 RX ADMIN — METRONIDAZOLE 500 MILLIGRAM(S): 500 TABLET ORAL at 21:43

## 2024-12-13 RX ADMIN — Medication 0.5 MILLIGRAM(S): at 09:36

## 2024-12-13 RX ADMIN — CHLORHEXIDINE GLUCONATE 1 APPLICATION(S): 1.2 RINSE ORAL at 13:26

## 2024-12-13 RX ADMIN — CARVEDILOL 25 MILLIGRAM(S): 25 TABLET, FILM COATED ORAL at 06:43

## 2024-12-13 RX ADMIN — Medication 60 MILLIGRAM(S): at 13:25

## 2024-12-13 RX ADMIN — Medication 81 MILLIGRAM(S): at 13:25

## 2024-12-13 RX ADMIN — METRONIDAZOLE 500 MILLIGRAM(S): 500 TABLET ORAL at 13:25

## 2024-12-13 RX ADMIN — ERYTHROPOIETIN 10000 UNIT(S): 3000 INJECTION, SOLUTION INTRAVENOUS; SUBCUTANEOUS at 11:09

## 2024-12-13 NOTE — PROGRESS NOTE ADULT - ATTENDING COMMENTS
ADALID  planned BT at HD today  pt refused to extend his HD time  so PRBC will be given on the floor    After completion of PRBC (if timely) he can go home today or tomorrow early AM

## 2024-12-13 NOTE — DISCHARGE NOTE PROVIDER - DATE OF DISCHARGE SERVICE:
Pt with rash that mom noticed prior to arrival. rash pruritic. pt's mom states pt is always putting things in her mouth and plays with her perfume. didn't swallow anything. no cp, sob. no lip tongue or throat swelling. no fever.   rash resolved  stable for d/c 14-Dec-2024

## 2024-12-13 NOTE — DISCHARGE NOTE NURSING/CASE MANAGEMENT/SOCIAL WORK - FINANCIAL ASSISTANCE
[facility] provides services at a reduced cost to those who are determined to be eligible through [facility]’s financial assistance program. Information regarding [facility]’s financial assistance program can be found by going to [email or web address] or by calling [phone].

## 2024-12-13 NOTE — DISCHARGE NOTE PROVIDER - NSDCFUSCHEDAPPT_GEN_ALL_CORE_FT
Felice Frankel  [facility] Physician [facility]  CARDIOLOGY [address]  Scheduled Appointment: 12/17/2024

## 2024-12-13 NOTE — PROGRESS NOTE ADULT - ASSESSMENT
65 y/o M w/ hx of Afib, s/p watchman, no longer on AC, ESRD on HD TThSa via left UE AFV, chronic anemia, CAD s/p CABG, aortic & mitral valve replacement in 11/20 with Dr. Butler, aortic dissection s/p repair, ischemic bowel s/p resection, seizure disorder on Keppra, IE of MV with MSSA and s/p course of Ancef with f/u MUSTAPHA negative for vegetation in 23' admitted with volume overload, uremic gastropathy from missing HD and non-compliance with meds.    #ESRD on HD  - Telemetry monitoring  - Lasix 80mg IV given with good response  - Torsemide 20mg daily put on hold today  - Dialysis TThSa    #Afib:  - S/p Watchman  - Aspirin 81mg daily  - Coreg 25mg q12h (incrsed dose)    #CAD s/p CABG  - Denies CP/Anginal symptoms  - Troponin chronically elevated from ESRD  - Aspirin 81mg daily  - Lipitor 40mg nightly    #Chronic anemia  - Hb at baseline  - FA/Nephrovite     #Seizure disorder:  - No recent seizures reported  - Keppra 500mg BID    #Recent Bacteroides bacteremia:  - Blood Cx cleared before left AMA  - ID notes reviewed  - Follow blood culture in process - NGTD  - Cont. Flagyl/Vantin renal dose    Dispo: home

## 2024-12-13 NOTE — DISCHARGE NOTE PROVIDER - NSDCMRMEDTOKEN_GEN_ALL_CORE_FT
amLODIPine 5 mg oral tablet: 1 tab(s) orally once a day  Aspi-Cor 81 mg oral delayed release tablet: 1 tab(s) orally once a day  atorvastatin 40 mg oral tablet: 1 tab(s) orally once a day (at bedtime)  carvedilol 12.5 mg oral tablet: 1 tab(s) orally every 12 hours  cefpodoxime 200 mg oral tablet: 1 tab(s) orally every 24 hours  cloNIDine 0.1 mg oral tablet: 1 tab(s) orally 2 times a day  hydrALAZINE 100 mg oral tablet: 1 tab(s) orally 3 times a day  isosorbide mononitrate 60 mg oral tablet, extended release: 1 tab(s) orally once a day  Keppra 500 mg oral tablet: 1 tab(s) orally 2 times a day  melatonin 3 mg oral tablet: 1 tab(s) orally once a day (at bedtime) As needed Insomnia  metroNIDAZOLE 500 mg oral tablet: 1 tab(s) orally every 8 hours  Nephro-Russel oral tablet: 1 tab(s) orally once a day  pantoprazole 40 mg oral delayed release tablet: 1 tab(s) orally once a day (before a meal)  risankizumab: every 3 weeks  sevelamer carbonate 800 mg oral tablet: 1 tab(s) orally 3 times a day (with meals)  tamsulosin 0.4 mg oral capsule: 1 cap(s) orally once a day (at bedtime)  torsemide 20 mg oral tablet: 1 tab(s) orally once a day

## 2024-12-13 NOTE — PROGRESS NOTE ADULT - SUBJECTIVE AND OBJECTIVE BOX
Patient was seen and evaluated on dialysis.   No c/o CP SOB NV but feels tired, wants to cut back HD time  no F/C  no swelling    T(C): 36.4 (12-13-24 @ 09:06), Max: 36.8 (12-12-24 @ 17:20)  HR: 82 (12-13-24 @ 09:06) (82 - 116)  BP: 121/57 (12-13-24 @ 09:06) (100/55 - 121/57)  Wt(kg): --  PE ;  NAD Thin  lungs - CTA  CV gr 1 murmer,  No gallop or rub  Abd : soft, NT BS +, No masses  Ext- No edema  Neuro : Grossly intact, moving extremities                             7.1    3.87  )-----------( 57       ( 13 Dec 2024 04:40 )             22.8        12-13    140  |  103  |  110.5[H]  ----------------------------<  96  4.2   |  18.0[L]  |  9.33[H]    Ca    8.6      13 Dec 2024 04:40  Phos  7.4     12-13  Mg     2.3     12-13        MEDICATIONS  (STANDING):  acetaminophen     Tablet .. PRN  ALPRAZolam PRN  amLODIPine   Tablet  aspirin enteric coated  atorvastatin  carvedilol  cefpodoxime  chlorhexidine 2% Cloths  cloNIDine  epoetin yolanda-epbx (RETACRIT) Injectable  heparin   Injectable  hydrALAZINE  HYDROmorphone  Injectable PRN  isosorbide   mononitrate ER Tablet (IMDUR)  levETIRAcetam  metroNIDAZOLE    Tablet  Nephro-jeanie  pantoprazole  Injectable  sevelamer carbonate  tamsulosin      Patient stable  Serg HD easily  Continue

## 2024-12-13 NOTE — DISCHARGE NOTE PROVIDER - HOSPITAL COURSE
The patient is a 64-year-old male with a history of atrial fibrillation (status post-Watchman device), end-stage renal disease on hemodialysis (T/Th/Sa) via left upper extremity arteriovenous fistula, chronic anemia, coronary artery disease (status post-CABG), aortic and mitral valve replacement, aortic dissection repair, ischemic bowel resection, and seizure disorder managed with Keppra. The patient presented to the emergency department with complaints of shortness of breath and fatigue, along with nausea but without vomiting. On evaluation, he was found to be hypertensive with a BUN of 120 and creatinine of 8, requiring 2 liters of nasal cannula oxygen to maintain normal saturation. A chest X-ray showed mild pulmonary vascular congestion. He was treated with clonidine, Lasix, and hydralazine, which improved his blood pressure. A nephrology consultation was obtained for hemodialysis. The patient denied any issues with his AV fistula, chest pain, headache, focal weakness, or recurrent bleeding, and his hemoglobin was at baseline.    Management included telemetry monitoring for his ESRD, administration of Lasix 80 mg IV with a good response, and holding of Torsemide 20 mg for the day. Dialysis was continued as scheduled. For atrial fibrillation, aspirin 81 mg daily and Coreg 25 mg every 12 hours were continued. For coronary artery disease, the patient denied any chest pain or anginal symptoms, and his treatment included aspirin 81 mg daily and Lipitor 40 mg nightly. His chronic anemia remained stable with baseline hemoglobin. For his seizure disorder, no recent seizures were reported, and he continued on Keppra 500 mg twice daily. Recent Bacteroides bacteremia had cleared from blood cultures before the patient left against medical advice, with ID notes reviewed, and he continued on Flagyl/Vantin at renal doses, with current cultures showing no growth to date. The patient was stabilized and discharged home, with follow-up plans and emphasis on medication adherence.

## 2024-12-13 NOTE — PROGRESS NOTE ADULT - ASSESSMENT
ESRD - Some PVC on CXR  HD TTS  in [facility]  REFUSING additional HD yestrday - on HD now  Explained helpful role of RRT  Add one liter fluid restrict to diet     Anemia - Recent colonoscopy few weeks ago   Hgb noted - in view of Sx - 1 U PRBC  Will provide Retacrit with HD       HTN - Resumed meds     RO - Renvela with meals     Will follow

## 2024-12-13 NOTE — DISCHARGE NOTE NURSING/CASE MANAGEMENT/SOCIAL WORK - PATIENT PORTAL LINK FT
You can access the FollowMyHealth Patient Portal offered by [facility] by registering at the following website: [email or web address]. By joining [employer]’s FollowMyHealth portal, you will also be able to view your health information using other applications (apps) compatible with our system.

## 2024-12-13 NOTE — PROGRESS NOTE ADULT - SUBJECTIVE AND OBJECTIVE BOX
SUBJECTIVE    BRIEF HOSPITAL COURSE: 63 y/o M w/ hx of Afib, s/p watchman, no longer on AC, ESRD on HD T/Th/Sa via left UE AFV, chronic anemia, CAD s/p CABG, aortic & mitral valve replacement, aortic dissection s/p repair, ischemic bowel s/p resection, seizure disorder on Keppra, IE of MV with MSSA and s/p course of Ancef with f/u MUSTAPHA negative for vegetation in 23'.  Patient returns to ED today c/o SOB, and fatigue. Still nauseated but denies vomiting. In the ED noted to be hypertensive, and labs with BUN of 120 and Cr. of 8. Requiring 2 L NC to maintain normal sat. CXR with mild PVC. Given clonidine, Lasix and hydralazine with improvement in BP. ED Placed consult to Nephrology for HD in AM. Patient denies issues with AVF. Denies CP, HA, focal weakness, or recurrent bleeding. Hbg at baseline.      INTERVAL HPI: Patient seen and examined at bedside. No acute overnight events reported. Patient states he is tired but no other complaints. Pending dialysis today. No fever, chills, cough, nausea or vomiting.     PAST MEDICAL & SURGICAL HISTORY:  CHF (congestive heart failure)      CVA (cerebral vascular accident)      Seizures  last seizure 10 years ago      HTN (hypertension)      Hyperlipemia      COVID-19  october 2020      Atrial fibrillation      Former smoker      History of cocaine use  remote hx, currently sober      H/O aortic dissection  s/p repair (2010), surgery complicated by bowel ischemia s/p bowel resection and ostomy with reversal      H/O aortic valve insufficiency      Mitral regurgitation      GIB (gastrointestinal bleeding)      CAD (coronary artery disease)  s/p PCI 1998  and CABG x 2 11/2020      Chronic atrial fibrillation      Aneurysm of artery of upper extremity      Anemia of chronic disease      End stage renal disease      Myocardial infarction      COVID-19 vaccine series completed      Port-A-Cath in place      H/O colectomy      Status post double vessel coronary artery bypass  11/2020 Dr Butler      S/P AVR (aortic valve replacement)  (t)AVR 11/2020 Dr Butler      S/P MVR (mitral valve replacement)  (t)MVR 11/2020 Dr Butler      H/O aortic dissection  Type A; emergent repair 2010      AV fistula  LUE      History of renal transplant      Presence of Watchman left atrial appendage closure device          ROS:  All other systems were reviewed and are negative.    OBJECTIVE    Vital Signs Last 24 Hrs  T(C): 36.4 (13 Dec 2024 04:25), Max: 36.8 (12 Dec 2024 17:20)  T(F): 97.6 (13 Dec 2024 04:25), Max: 98.3 (12 Dec 2024 17:20)  HR: 82 (13 Dec 2024 04:25) (82 - 122)  BP: 112/57 (13 Dec 2024 04:25) (100/55 - 152/77)  BP(mean): --  RR: 18 (13 Dec 2024 04:25) (17 - 18)  SpO2: 95% (13 Dec 2024 04:25) (94% - 95%)    Parameters below as of 13 Dec 2024 04:25  Patient On (Oxygen Delivery Method): nasal cannula  O2 Flow (L/min): 3      PHYSICAL EXAM:  Constitutional: alert and oriented, in no acute distress   Neck: Soft and supple  Respiratory: Clear to auscultation bilaterally  Cardiovascular: Regular rate and rhyhtm  Gastrointestinal: Soft, non-tender to palpation, +bs  Musculoskeletal:  no lower extremity edema bilaterally    MEDICATIONS  (STANDING):  amLODIPine   Tablet 5 milliGRAM(s) Oral daily  aspirin enteric coated 81 milliGRAM(s) Oral daily  atorvastatin 40 milliGRAM(s) Oral at bedtime  carvedilol 25 milliGRAM(s) Oral every 12 hours  cefpodoxime 200 milliGRAM(s) Oral daily  chlorhexidine 2% Cloths 1 Application(s) Topical daily  cloNIDine 0.1 milliGRAM(s) Oral two times a day  epoetin yolanda-epbx (RETACRIT) Injectable 51575 Unit(s) IV Push <User Schedule>  heparin   Injectable 5000 Unit(s) SubCutaneous every 12 hours  hydrALAZINE 100 milliGRAM(s) Oral three times a day  isosorbide   mononitrate ER Tablet (IMDUR) 60 milliGRAM(s) Oral daily  levETIRAcetam 500 milliGRAM(s) Oral two times a day  metroNIDAZOLE    Tablet 500 milliGRAM(s) Oral every 8 hours  Nephro-jeanie 1 Tablet(s) Oral daily  pantoprazole  Injectable 40 milliGRAM(s) IV Push two times a day  sevelamer carbonate 800 milliGRAM(s) Oral three times a day with meals  tamsulosin 0.4 milliGRAM(s) Oral at bedtime    MEDICATIONS  (PRN):  acetaminophen     Tablet .. 650 milliGRAM(s) Oral every 6 hours PRN Mild Pain (1 - 3)  ALPRAZolam 0.5 milliGRAM(s) Oral every 8 hours PRN anxiety  HYDROmorphone  Injectable 0.5 milliGRAM(s) IV Push every 6 hours PRN Moderate Pain (4 - 6)    Allergies    penicillin (Other)    Intolerances        LABS:                        7.1    3.87  )-----------( 57       ( 13 Dec 2024 04:40 )             22.8     12-13    140  |  103  |  110.5[H]  ----------------------------<  96  4.2   |  18.0[L]  |  9.33[H]    Ca    8.6      13 Dec 2024 04:40  Phos  7.4     12-13  Mg     2.3     12-13        Urinalysis Basic - ( 13 Dec 2024 04:40 )    Color: x / Appearance: x / SG: x / pH: x  Gluc: 96 mg/dL / Ketone: x  / Bili: x / Urobili: x   Blood: x / Protein: x / Nitrite: x   Leuk Esterase: x / RBC: x / WBC x   Sq Epi: x / Non Sq Epi: x / Bacteria: x      CAPILLARY BLOOD GLUCOSE          CULTURE DATA:    Culture - Blood (collected 12-10-24 @ 19:30)  Source: .Blood BLOOD  Preliminary Report (12-13-24 @ 02:01):    No growth at 48 Hours    Culture - Blood (collected 12-10-24 @ 21:15)  Source: .Blood BLOOD  Preliminary Report (12-13-24 @ 02:01):    No growth at 48 Hours    Culture - Urine (collected 12-11-24 @ 03:48)  Source: Clean Catch Clean Catch (Midstream)  Final Report (12-12-24 @ 08:24):    <10,000 CFU/mL Normal Urogenital Sivan        RADIOLOGY & ADDITIONAL TESTS:

## 2024-12-13 NOTE — DISCHARGE NOTE PROVIDER - NSDCCPCAREPLAN_GEN_ALL_CORE_FT
PRINCIPAL DISCHARGE DIAGNOSIS  Diagnosis: Pulmonary edema  Assessment and Plan of Treatment: Please continue your dialysis schedule as outpatient.      SECONDARY DISCHARGE DIAGNOSES  Diagnosis: Acute on chronic renal failure  Assessment and Plan of Treatment:      PRINCIPAL DISCHARGE DIAGNOSIS  Diagnosis: Pulmonary edema  Assessment and Plan of Treatment: Please continue your dialysis schedule as outpatient.      SECONDARY DISCHARGE DIAGNOSES  Diagnosis: Acute on chronic renal failure  Assessment and Plan of Treatment:     Diagnosis: Bacteremia  Assessment and Plan of Treatment: Take prescribed antibiotics. Follow with your PMD    Diagnosis: ESRD on dialysis  Assessment and Plan of Treatment: HD as per schedule

## 2024-12-13 NOTE — DISCHARGE NOTE NURSING/CASE MANAGEMENT/SOCIAL WORK - NSDCVIVACCINE_GEN_ALL_CORE_FT
influenza, injectable, quadrivalent, preservative free; 12-Dec-2020 09:28; Lynn Gerardo); [employer]; 724k2 (Exp. Date: 30-Jun-2021); IntraMuscular; Deltoid Right.; 0.5 milliLiter(s); VIS (VIS Published: 15-Aug-2019, VIS Presented: 12-Dec-2020);

## 2024-12-14 VITALS
HEART RATE: 86 BPM | OXYGEN SATURATION: 98 % | RESPIRATION RATE: 18 BRPM | DIASTOLIC BLOOD PRESSURE: 78 MMHG | TEMPERATURE: 98 F | SYSTOLIC BLOOD PRESSURE: 147 MMHG

## 2024-12-14 LAB
ANION GAP SERPL CALC-SCNC: 11 MMOL/L — SIGNIFICANT CHANGE UP (ref 5–17)
BUN SERPL-MCNC: 61.9 MG/DL — HIGH (ref 8–20)
CALCIUM SERPL-MCNC: 8.1 MG/DL — LOW (ref 8.4–10.5)
CHLORIDE SERPL-SCNC: 101 MMOL/L — SIGNIFICANT CHANGE UP (ref 96–108)
CO2 SERPL-SCNC: 26 MMOL/L — SIGNIFICANT CHANGE UP (ref 22–29)
CREAT SERPL-MCNC: 5.8 MG/DL — HIGH (ref 0.5–1.3)
EGFR: 10 ML/MIN/1.73M2 — LOW
GLUCOSE SERPL-MCNC: 91 MG/DL — SIGNIFICANT CHANGE UP (ref 70–99)
HCT VFR BLD CALC: 25.6 % — LOW (ref 39–50)
HGB BLD-MCNC: 8.4 G/DL — LOW (ref 13–17)
MAGNESIUM SERPL-MCNC: 1.9 MG/DL — SIGNIFICANT CHANGE UP (ref 1.6–2.6)
MCHC RBC-ENTMCNC: 30.9 PG — SIGNIFICANT CHANGE UP (ref 27–34)
MCHC RBC-ENTMCNC: 32.8 G/DL — SIGNIFICANT CHANGE UP (ref 32–36)
MCV RBC AUTO: 94.1 FL — SIGNIFICANT CHANGE UP (ref 80–100)
PLATELET # BLD AUTO: 66 K/UL — LOW (ref 150–400)
POTASSIUM SERPL-MCNC: 4.5 MMOL/L — SIGNIFICANT CHANGE UP (ref 3.5–5.3)
POTASSIUM SERPL-SCNC: 4.5 MMOL/L — SIGNIFICANT CHANGE UP (ref 3.5–5.3)
RBC # BLD: 2.72 M/UL — LOW (ref 4.2–5.8)
RBC # FLD: 15.8 % — HIGH (ref 10.3–14.5)
SODIUM SERPL-SCNC: 138 MMOL/L — SIGNIFICANT CHANGE UP (ref 135–145)
WBC # BLD: 3.09 K/UL — LOW (ref 3.8–10.5)
WBC # FLD AUTO: 3.09 K/UL — LOW (ref 3.8–10.5)

## 2024-12-14 PROCEDURE — 99239 HOSP IP/OBS DSCHRG MGMT >30: CPT

## 2024-12-14 RX ADMIN — Medication 1 TABLET(S): at 11:15

## 2024-12-14 RX ADMIN — PANTOPRAZOLE SODIUM 40 MILLIGRAM(S): 40 TABLET, DELAYED RELEASE ORAL at 05:39

## 2024-12-14 RX ADMIN — Medication 60 MILLIGRAM(S): at 11:15

## 2024-12-14 RX ADMIN — Medication 81 MILLIGRAM(S): at 11:15

## 2024-12-14 RX ADMIN — METRONIDAZOLE 500 MILLIGRAM(S): 500 TABLET ORAL at 05:39

## 2024-12-14 RX ADMIN — HYDROMORPHONE HYDROCHLORIDE 0.5 MILLIGRAM(S): 2 TABLET ORAL at 06:45

## 2024-12-14 RX ADMIN — CLONIDINE HYDROCHLORIDE 0.1 MILLIGRAM(S): 0.3 TABLET ORAL at 05:38

## 2024-12-14 RX ADMIN — Medication 5000 UNIT(S): at 05:38

## 2024-12-14 RX ADMIN — HYDROMORPHONE HYDROCHLORIDE 0.5 MILLIGRAM(S): 2 TABLET ORAL at 00:46

## 2024-12-14 RX ADMIN — HYDRALAZINE HYDROCHLORIDE 100 MILLIGRAM(S): 10 TABLET ORAL at 05:38

## 2024-12-14 RX ADMIN — CHLORHEXIDINE GLUCONATE 1 APPLICATION(S): 1.2 RINSE ORAL at 11:16

## 2024-12-14 RX ADMIN — CEFPODOXIME PROXETIL 200 MILLIGRAM(S): 100 GRANULE, FOR SUSPENSION ORAL at 11:15

## 2024-12-14 RX ADMIN — AMLODIPINE BESYLATE 5 MILLIGRAM(S): 10 TABLET ORAL at 05:37

## 2024-12-14 RX ADMIN — CARVEDILOL 25 MILLIGRAM(S): 25 TABLET, FILM COATED ORAL at 05:38

## 2024-12-14 RX ADMIN — LEVETIRACETAM 500 MILLIGRAM(S): 1000 TABLET ORAL at 05:41

## 2024-12-14 RX ADMIN — HYDROMORPHONE HYDROCHLORIDE 0.5 MILLIGRAM(S): 2 TABLET ORAL at 05:46

## 2024-12-14 NOTE — PROGRESS NOTE ADULT - REASON FOR ADMISSION
Volume overload   Missed HD x 2

## 2024-12-14 NOTE — PROGRESS NOTE ADULT - NSPROGADDITIONALINFOA_GEN_ALL_CORE
Discussed with the  and confirmed that the patient has a dialysis seat on Monday (12/16) with his outpatient dialysis center.

## 2024-12-14 NOTE — PROGRESS NOTE ADULT - SUBJECTIVE AND OBJECTIVE BOX
SUBJECTIVE    BRIEF HOSPITAL COURSE: 65 y/o M w/ hx of Afib, s/p watchman, no longer on AC, ESRD on HD T/Th/Sa via left UE AFV, chronic anemia, CAD s/p CABG, aortic & mitral valve replacement, aortic dissection s/p repair, ischemic bowel s/p resection, seizure disorder on Keppra, IE of MV with MSSA and s/p course of Ancef with f/u MUSTAPHA negative for vegetation in 23'.  Patient returns to ED today c/o SOB, and fatigue. Still nauseated but denies vomiting. In the ED noted to be hypertensive, and labs with BUN of 120 and Cr. of 8. Requiring 2 L NC to maintain normal sat. CXR with mild PVC. Given clonidine, Lasix and hydralazine with improvement in BP. ED Placed consult to Nephrology for HD in AM. Patient denies issues with AVF. Denies CP, HA, focal weakness, or recurrent bleeding. Hbg at baseline.      INTERVAL HPI: Patient seen and examined at bedside. No acute overnight events reported. Patient states he is tired but no other complaints. Pending dialysis today. No fever, chills, cough, nausea or vomiting.     PAST MEDICAL & SURGICAL HISTORY:  CHF (congestive heart failure)      CVA (cerebral vascular accident)      Seizures  last seizure 10 years ago      HTN (hypertension)      Hyperlipemia      COVID-19  october 2020      Atrial fibrillation      Former smoker      History of cocaine use  remote hx, currently sober      H/O aortic dissection  s/p repair (2010), surgery complicated by bowel ischemia s/p bowel resection and ostomy with reversal      H/O aortic valve insufficiency      Mitral regurgitation      GIB (gastrointestinal bleeding)      CAD (coronary artery disease)  s/p PCI 1998  and CABG x 2 11/2020      Chronic atrial fibrillation      Aneurysm of artery of upper extremity      Anemia of chronic disease      End stage renal disease      Myocardial infarction      COVID-19 vaccine series completed      Port-A-Cath in place      H/O colectomy      Status post double vessel coronary artery bypass  11/2020 Dr Butler      S/P AVR (aortic valve replacement)  (t)AVR 11/2020 Dr Butler      S/P MVR (mitral valve replacement)  (t)MVR 11/2020 Dr Butler      H/O aortic dissection  Type A; emergent repair 2010      AV fistula  LUE      History of renal transplant      Presence of Watchman left atrial appendage closure device          ROS:  All other systems were reviewed and are negative.    OBJECTIVE    Vital Signs Last 24 Hrs  T(C): 36.6 (14 Dec 2024 04:11), Max: 36.7 (13 Dec 2024 19:34)  T(F): 97.9 (14 Dec 2024 04:11), Max: 98 (13 Dec 2024 19:34)  HR: 82 (14 Dec 2024 04:11) (70 - 97)  BP: 129/69 (14 Dec 2024 04:11) (107/61 - 137/74)  BP(mean): --  RR: 18 (14 Dec 2024 04:11) (18 - 18)  SpO2: 94% (14 Dec 2024 04:11) (94% - 100%)    Parameters below as of 14 Dec 2024 04:11  Patient On (Oxygen Delivery Method): room air        PHYSICAL EXAM:  Constitutional: alert and oriented, in no acute distress   Neck: Soft and supple  Respiratory: Clear to auscultation bilaterally  Cardiovascular: Regular rate and rhyhtm  Gastrointestinal: Soft, non-tender to palpation, +bs  Musculoskeletal:  no lower extremity edema bilaterally    MEDICATIONS  (STANDING):  amLODIPine   Tablet 5 milliGRAM(s) Oral daily  aspirin enteric coated 81 milliGRAM(s) Oral daily  atorvastatin 40 milliGRAM(s) Oral at bedtime  carvedilol 25 milliGRAM(s) Oral every 12 hours  cefpodoxime 200 milliGRAM(s) Oral daily  chlorhexidine 2% Cloths 1 Application(s) Topical daily  cloNIDine 0.1 milliGRAM(s) Oral two times a day  epoetin yolanda-epbx (RETACRIT) Injectable 37609 Unit(s) IV Push <User Schedule>  heparin   Injectable 5000 Unit(s) SubCutaneous every 12 hours  hydrALAZINE 100 milliGRAM(s) Oral three times a day  isosorbide   mononitrate ER Tablet (IMDUR) 60 milliGRAM(s) Oral daily  levETIRAcetam 500 milliGRAM(s) Oral two times a day  metroNIDAZOLE    Tablet 500 milliGRAM(s) Oral every 8 hours  Nephro-jeanie 1 Tablet(s) Oral daily  pantoprazole  Injectable 40 milliGRAM(s) IV Push two times a day  sevelamer carbonate 1600 milliGRAM(s) Oral three times a day with meals  tamsulosin 0.4 milliGRAM(s) Oral at bedtime    MEDICATIONS  (PRN):  acetaminophen     Tablet .. 650 milliGRAM(s) Oral every 6 hours PRN Mild Pain (1 - 3)  ALPRAZolam 0.5 milliGRAM(s) Oral every 8 hours PRN anxiety  HYDROmorphone  Injectable 0.5 milliGRAM(s) IV Push every 6 hours PRN Moderate Pain (4 - 6)    Allergies    penicillin (Other)    Intolerances        LABS:                        8.4    3.09  )-----------( 66       ( 14 Dec 2024 01:45 )             25.6     12-14    138  |  101  |  61.9[H]  ----------------------------<  91  4.5   |  26.0  |  5.80[H]    Ca    8.1[L]      14 Dec 2024 01:45  Phos  7.4     12-13  Mg     1.9     12-14        Urinalysis Basic - ( 14 Dec 2024 01:45 )    Color: x / Appearance: x / SG: x / pH: x  Gluc: 91 mg/dL / Ketone: x  / Bili: x / Urobili: x   Blood: x / Protein: x / Nitrite: x   Leuk Esterase: x / RBC: x / WBC x   Sq Epi: x / Non Sq Epi: x / Bacteria: x      CAPILLARY BLOOD GLUCOSE          CULTURE DATA:    Culture - Blood (collected 12-10-24 @ 19:30)  Source: .Blood BLOOD  Preliminary Report (12-14-24 @ 02:01):    No growth at 72 Hours    Culture - Blood (collected 12-10-24 @ 21:15)  Source: .Blood BLOOD  Preliminary Report (12-14-24 @ 02:01):    No growth at 72 Hours    Culture - Urine (collected 12-11-24 @ 03:48)  Source: Clean Catch Clean Catch (Midstream)  Final Report (12-12-24 @ 08:24):    <10,000 CFU/mL Normal Urogenital Sivan        RADIOLOGY & ADDITIONAL TESTS: SUBJECTIVE    BRIEF HOSPITAL COURSE: 65 y/o M w/ hx of Afib, s/p watchman, no longer on AC, ESRD on HD T/Th/Sa via left UE AFV, chronic anemia, CAD s/p CABG, aortic & mitral valve replacement, aortic dissection s/p repair, ischemic bowel s/p resection, seizure disorder on Keppra, IE of MV with MSSA and s/p course of Ancef with f/u MUSTAPHA negative for vegetation in 23'.  Patient returns to ED today c/o SOB, and fatigue. Still nauseated but denies vomiting. In the ED noted to be hypertensive, and labs with BUN of 120 and Cr. of 8. Requiring 2 L NC to maintain normal sat. CXR with mild PVC. Given clonidine, Lasix and hydralazine with improvement in BP. ED Placed consult to Nephrology for HD in AM. Patient denies issues with AVF. Denies CP, HA, focal weakness, or recurrent bleeding. Hbg at baseline.      INTERVAL HPI: Patient seen and examined at bedside. No acute overnight events reported. Patient states he is tired but no other complaints. S/p dialysis yesterday, and patient is scheduled for dialysis today, but if refusing. Discussed the importance of consistent schedule for dialysis, and the patient understands. States that he will be setting up a dialysis appointment for Monday. No fever, chills, cough, nausea or vomiting.     PAST MEDICAL & SURGICAL HISTORY:  CHF (congestive heart failure)      CVA (cerebral vascular accident)      Seizures  last seizure 10 years ago      HTN (hypertension)      Hyperlipemia      COVID-19 october 2020      Atrial fibrillation      Former smoker      History of cocaine use  remote hx, currently sober      H/O aortic dissection  s/p repair (2010), surgery complicated by bowel ischemia s/p bowel resection and ostomy with reversal      H/O aortic valve insufficiency      Mitral regurgitation      GIB (gastrointestinal bleeding)      CAD (coronary artery disease)  s/p PCI 1998  and CABG x 2 11/2020      Chronic atrial fibrillation      Aneurysm of artery of upper extremity      Anemia of chronic disease      End stage renal disease      Myocardial infarction      COVID-19 vaccine series completed      Port-A-Cath in place      H/O colectomy      Status post double vessel coronary artery bypass  11/2020 Dr Butler      S/P AVR (aortic valve replacement)  (t)AVR 11/2020 Dr Butler      S/P MVR (mitral valve replacement)  (t)MVR 11/2020 Dr Luke      H/O aortic dissection  Type A; emergent repair 2010      AV fistula  LUE      History of renal transplant      Presence of Watchman left atrial appendage closure device          ROS:  All other systems were reviewed and are negative.    OBJECTIVE    Vital Signs Last 24 Hrs  T(C): 36.6 (14 Dec 2024 04:11), Max: 36.7 (13 Dec 2024 19:34)  T(F): 97.9 (14 Dec 2024 04:11), Max: 98 (13 Dec 2024 19:34)  HR: 82 (14 Dec 2024 04:11) (70 - 97)  BP: 129/69 (14 Dec 2024 04:11) (107/61 - 137/74)  BP(mean): --  RR: 18 (14 Dec 2024 04:11) (18 - 18)  SpO2: 94% (14 Dec 2024 04:11) (94% - 100%)    Parameters below as of 14 Dec 2024 04:11  Patient On (Oxygen Delivery Method): room air        PHYSICAL EXAM:  Constitutional: alert and oriented, in no acute distress   Neck: Soft and supple  Respiratory: Clear to auscultation bilaterally  Cardiovascular: Regular rate and rhyhtm  Gastrointestinal: Soft, non-tender to palpation, +bs  Musculoskeletal:  no lower extremity edema bilaterally    MEDICATIONS  (STANDING):  amLODIPine   Tablet 5 milliGRAM(s) Oral daily  aspirin enteric coated 81 milliGRAM(s) Oral daily  atorvastatin 40 milliGRAM(s) Oral at bedtime  carvedilol 25 milliGRAM(s) Oral every 12 hours  cefpodoxime 200 milliGRAM(s) Oral daily  chlorhexidine 2% Cloths 1 Application(s) Topical daily  cloNIDine 0.1 milliGRAM(s) Oral two times a day  epoetin yolanda-epbx (RETACRIT) Injectable 02463 Unit(s) IV Push <User Schedule>  heparin   Injectable 5000 Unit(s) SubCutaneous every 12 hours  hydrALAZINE 100 milliGRAM(s) Oral three times a day  isosorbide   mononitrate ER Tablet (IMDUR) 60 milliGRAM(s) Oral daily  levETIRAcetam 500 milliGRAM(s) Oral two times a day  metroNIDAZOLE    Tablet 500 milliGRAM(s) Oral every 8 hours  Nephro-jeanie 1 Tablet(s) Oral daily  pantoprazole  Injectable 40 milliGRAM(s) IV Push two times a day  sevelamer carbonate 1600 milliGRAM(s) Oral three times a day with meals  tamsulosin 0.4 milliGRAM(s) Oral at bedtime    MEDICATIONS  (PRN):  acetaminophen     Tablet .. 650 milliGRAM(s) Oral every 6 hours PRN Mild Pain (1 - 3)  ALPRAZolam 0.5 milliGRAM(s) Oral every 8 hours PRN anxiety  HYDROmorphone  Injectable 0.5 milliGRAM(s) IV Push every 6 hours PRN Moderate Pain (4 - 6)    Allergies    penicillin (Other)    Intolerances        LABS:                        8.4    3.09  )-----------( 66       ( 14 Dec 2024 01:45 )             25.6     12-14    138  |  101  |  61.9[H]  ----------------------------<  91  4.5   |  26.0  |  5.80[H]    Ca    8.1[L]      14 Dec 2024 01:45  Phos  7.4     12-13  Mg     1.9     12-14        Urinalysis Basic - ( 14 Dec 2024 01:45 )    Color: x / Appearance: x / SG: x / pH: x  Gluc: 91 mg/dL / Ketone: x  / Bili: x / Urobili: x   Blood: x / Protein: x / Nitrite: x   Leuk Esterase: x / RBC: x / WBC x   Sq Epi: x / Non Sq Epi: x / Bacteria: x      CAPILLARY BLOOD GLUCOSE          CULTURE DATA:    Culture - Blood (collected 12-10-24 @ 19:30)  Source: .Blood BLOOD  Preliminary Report (12-14-24 @ 02:01):    No growth at 72 Hours    Culture - Blood (collected 12-10-24 @ 21:15)  Source: .Blood BLOOD  Preliminary Report (12-14-24 @ 02:01):    No growth at 72 Hours    Culture - Urine (collected 12-11-24 @ 03:48)  Source: Clean Catch Clean Catch (Midstream)  Final Report (12-12-24 @ 08:24):    <10,000 CFU/mL Normal Urogenital Sivan        RADIOLOGY & ADDITIONAL TESTS:

## 2024-12-14 NOTE — PROGRESS NOTE ADULT - ASSESSMENT
63 y/o M w/ hx of Afib, s/p watchman, no longer on AC, ESRD on HD TThSa via left UE AFV, chronic anemia, CAD s/p CABG, aortic & mitral valve replacement in 11/20 with Dr. Butler, aortic dissection s/p repair, ischemic bowel s/p resection, seizure disorder on Keppra, IE of MV with MSSA and s/p course of Ancef with f/u MUSTAPHA negative for vegetation in 23' admitted with volume overload, uremic gastropathy from missing HD and non-compliance with meds.    #ESRD on HD  - Telemetry monitoring  - S/p Lasix IV  - Dialysis TThSa    #Afib:  - S/p Watchman  - C/w Aspirin 81mg daily  - C/w Coreg 25mg q12h    #CAD s/p CABG  - Troponin chronically elevated from ESRD  - C/w Aspirin 81mg QD  - C/w Lipitor 40mg QHS    #Chronic anemia  - Hb at baseline  - FA/Nephrovite     #Seizure disorder  - No recent seizures reported  - C/w Keppra 500mg BID    #Recent Bacteroides bacteremia:  - Blood Cx cleared before left AMA  - ID notes reviewed  - Follow blood culture in process - NGTD  - C/w Flagyl/Vantin renal dose    Dispo: home after dialysis

## 2024-12-17 ENCOUNTER — APPOINTMENT (OUTPATIENT)
Dept: CARDIOLOGY | Facility: CLINIC | Age: 64
End: 2024-12-17

## 2025-01-06 ENCOUNTER — INPATIENT (INPATIENT)
Facility: HOSPITAL | Age: 65
LOS: 7 days | Discharge: HOME | DRG: 871 | End: 2025-01-14
Attending: STUDENT IN AN ORGANIZED HEALTH CARE EDUCATION/TRAINING PROGRAM | Admitting: STUDENT IN AN ORGANIZED HEALTH CARE EDUCATION/TRAINING PROGRAM
Payer: MEDICARE

## 2025-01-06 VITALS
OXYGEN SATURATION: 93 % | RESPIRATION RATE: 16 BRPM | HEART RATE: 120 BPM | SYSTOLIC BLOOD PRESSURE: 160 MMHG | TEMPERATURE: 99 F | DIASTOLIC BLOOD PRESSURE: 80 MMHG | WEIGHT: 143.3 LBS

## 2025-01-06 DIAGNOSIS — Z95.818 PRESENCE OF OTHER CARDIAC IMPLANTS AND GRAFTS: Chronic | ICD-10-CM

## 2025-01-06 DIAGNOSIS — Z94.0 KIDNEY TRANSPLANT STATUS: Chronic | ICD-10-CM

## 2025-01-06 DIAGNOSIS — Z86.79 PERSONAL HISTORY OF OTHER DISEASES OF THE CIRCULATORY SYSTEM: Chronic | ICD-10-CM

## 2025-01-06 DIAGNOSIS — Z95.1 PRESENCE OF AORTOCORONARY BYPASS GRAFT: Chronic | ICD-10-CM

## 2025-01-06 DIAGNOSIS — Z95.2 PRESENCE OF PROSTHETIC HEART VALVE: Chronic | ICD-10-CM

## 2025-01-06 DIAGNOSIS — Z90.49 ACQUIRED ABSENCE OF OTHER SPECIFIED PARTS OF DIGESTIVE TRACT: Chronic | ICD-10-CM

## 2025-01-06 DIAGNOSIS — I77.0 ARTERIOVENOUS FISTULA, ACQUIRED: Chronic | ICD-10-CM

## 2025-01-06 PROCEDURE — 99285 EMERGENCY DEPT VISIT HI MDM: CPT | Mod: GC

## 2025-01-06 PROCEDURE — 93010 ELECTROCARDIOGRAM REPORT: CPT

## 2025-01-06 PROCEDURE — 36410 VNPNXR 3YR/> PHY/QHP DX/THER: CPT

## 2025-01-06 NOTE — ED ADULT TRIAGE NOTE - CHIEF COMPLAINT QUOTE
c/o vomiting, abd pain x 2 days. "I havent taken heart medication in 2 dats" Hx quadruple bypass. Hx of HD Tues/Thurs/Sat c/o vomiting, abd pain x 2 days. "I haven't taken heart medication in 2 days" Hx quadruple bypass. Hx of HD Tues/Thurs/Sat

## 2025-01-07 DIAGNOSIS — J11.1 INFLUENZA DUE TO UNIDENTIFIED INFLUENZA VIRUS WITH OTHER RESPIRATORY MANIFESTATIONS: ICD-10-CM

## 2025-01-07 LAB
ALBUMIN SERPL ELPH-MCNC: 4 G/DL — SIGNIFICANT CHANGE UP (ref 3.3–5.2)
ALLERGY+IMMUNOLOGY DIAG STUDY NOTE: SIGNIFICANT CHANGE UP
ALP SERPL-CCNC: 94 U/L — SIGNIFICANT CHANGE UP (ref 40–120)
ALT FLD-CCNC: 11 U/L — SIGNIFICANT CHANGE UP
ANION GAP SERPL CALC-SCNC: 20 MMOL/L — HIGH (ref 5–17)
ANISOCYTOSIS BLD QL: SLIGHT — SIGNIFICANT CHANGE UP
APPEARANCE UR: CLEAR — SIGNIFICANT CHANGE UP
APTT BLD: 35.3 SEC — SIGNIFICANT CHANGE UP (ref 24.5–35.6)
AST SERPL-CCNC: 21 U/L — SIGNIFICANT CHANGE UP
BACTERIA # UR AUTO: NEGATIVE /HPF — SIGNIFICANT CHANGE UP
BASOPHILS # BLD AUTO: 0 K/UL — SIGNIFICANT CHANGE UP (ref 0–0.2)
BASOPHILS NFR BLD AUTO: 0 % — SIGNIFICANT CHANGE UP (ref 0–2)
BILIRUB SERPL-MCNC: 0.9 MG/DL — SIGNIFICANT CHANGE UP (ref 0.4–2)
BILIRUB UR-MCNC: NEGATIVE — SIGNIFICANT CHANGE UP
BLD GP AB SCN SERPL QL: SIGNIFICANT CHANGE UP
BUN SERPL-MCNC: 72 MG/DL — HIGH (ref 8–20)
CALCIUM SERPL-MCNC: 8.7 MG/DL — SIGNIFICANT CHANGE UP (ref 8.4–10.5)
CAST: 1 /LPF — SIGNIFICANT CHANGE UP (ref 0–4)
CHLORIDE SERPL-SCNC: 100 MMOL/L — SIGNIFICANT CHANGE UP (ref 96–108)
CO2 SERPL-SCNC: 20 MMOL/L — LOW (ref 22–29)
COLOR SPEC: YELLOW — SIGNIFICANT CHANGE UP
CREAT SERPL-MCNC: 7.95 MG/DL — HIGH (ref 0.5–1.3)
DIFF PNL FLD: NEGATIVE — SIGNIFICANT CHANGE UP
DIR ANTIGLOB POLYSPECIFIC INTERPRETATION: SIGNIFICANT CHANGE UP
EGFR: 7 ML/MIN/1.73M2 — LOW
EOSINOPHIL # BLD AUTO: 0 K/UL — SIGNIFICANT CHANGE UP (ref 0–0.5)
EOSINOPHIL NFR BLD AUTO: 0 % — SIGNIFICANT CHANGE UP (ref 0–6)
FLUAV AG NPH QL: DETECTED
FLUBV AG NPH QL: SIGNIFICANT CHANGE UP
GAS PNL BLDV: SIGNIFICANT CHANGE UP
GIANT PLATELETS BLD QL SMEAR: PRESENT — SIGNIFICANT CHANGE UP
GLUCOSE SERPL-MCNC: 101 MG/DL — HIGH (ref 70–99)
GLUCOSE UR QL: NEGATIVE MG/DL — SIGNIFICANT CHANGE UP
HCT VFR BLD CALC: 29.3 % — LOW (ref 39–50)
HGB BLD-MCNC: 9.6 G/DL — LOW (ref 13–17)
INR BLD: 1.42 RATIO — HIGH (ref 0.85–1.16)
KETONES UR-MCNC: NEGATIVE MG/DL — SIGNIFICANT CHANGE UP
LEUKOCYTE ESTERASE UR-ACNC: NEGATIVE — SIGNIFICANT CHANGE UP
LIDOCAIN IGE QN: 35 U/L — SIGNIFICANT CHANGE UP (ref 22–51)
LYMPHOCYTES # BLD AUTO: 0.09 K/UL — LOW (ref 1–3.3)
LYMPHOCYTES # BLD AUTO: 1.7 % — LOW (ref 13–44)
MACROCYTES BLD QL: SLIGHT — SIGNIFICANT CHANGE UP
MAGNESIUM SERPL-MCNC: 1.8 MG/DL — SIGNIFICANT CHANGE UP (ref 1.6–2.6)
MANUAL SMEAR VERIFICATION: SIGNIFICANT CHANGE UP
MCHC RBC-ENTMCNC: 31.2 PG — SIGNIFICANT CHANGE UP (ref 27–34)
MCHC RBC-ENTMCNC: 32.8 G/DL — SIGNIFICANT CHANGE UP (ref 32–36)
MCV RBC AUTO: 95.1 FL — SIGNIFICANT CHANGE UP (ref 80–100)
MONOCYTES # BLD AUTO: 0.27 K/UL — SIGNIFICANT CHANGE UP (ref 0–0.9)
MONOCYTES NFR BLD AUTO: 5.3 % — SIGNIFICANT CHANGE UP (ref 2–14)
MRSA PCR RESULT.: SIGNIFICANT CHANGE UP
NEUTROPHILS # BLD AUTO: 4.82 K/UL — SIGNIFICANT CHANGE UP (ref 1.8–7.4)
NEUTROPHILS NFR BLD AUTO: 93 % — HIGH (ref 43–77)
NITRITE UR-MCNC: NEGATIVE — SIGNIFICANT CHANGE UP
NT-PROBNP SERPL-SCNC: HIGH PG/ML (ref 0–300)
OVALOCYTES BLD QL SMEAR: SLIGHT — SIGNIFICANT CHANGE UP
PH UR: 7.5 — SIGNIFICANT CHANGE UP (ref 5–8)
PHOSPHATE SERPL-MCNC: 6.8 MG/DL — HIGH (ref 2.4–4.7)
PLAT MORPH BLD: NORMAL — SIGNIFICANT CHANGE UP
PLATELET # BLD AUTO: 106 K/UL — LOW (ref 150–400)
POIKILOCYTOSIS BLD QL AUTO: SLIGHT — SIGNIFICANT CHANGE UP
POLYCHROMASIA BLD QL SMEAR: SLIGHT — SIGNIFICANT CHANGE UP
POTASSIUM SERPL-MCNC: 4.4 MMOL/L — SIGNIFICANT CHANGE UP (ref 3.5–5.3)
POTASSIUM SERPL-SCNC: 4.4 MMOL/L — SIGNIFICANT CHANGE UP (ref 3.5–5.3)
PROT SERPL-MCNC: 7.4 G/DL — SIGNIFICANT CHANGE UP (ref 6.6–8.7)
PROT UR-MCNC: 300 MG/DL
PROTHROM AB SERPL-ACNC: 16.4 SEC — HIGH (ref 9.9–13.4)
RBC # BLD: 3.08 M/UL — LOW (ref 4.2–5.8)
RBC # FLD: 17 % — HIGH (ref 10.3–14.5)
RBC BLD AUTO: ABNORMAL
RBC CASTS # UR COMP ASSIST: 2 /HPF — SIGNIFICANT CHANGE UP (ref 0–4)
RSV RNA NPH QL NAA+NON-PROBE: SIGNIFICANT CHANGE UP
S AUREUS DNA NOSE QL NAA+PROBE: SIGNIFICANT CHANGE UP
SARS-COV-2 RNA SPEC QL NAA+PROBE: SIGNIFICANT CHANGE UP
SODIUM SERPL-SCNC: 140 MMOL/L — SIGNIFICANT CHANGE UP (ref 135–145)
SP GR SPEC: 1.02 — SIGNIFICANT CHANGE UP (ref 1–1.03)
SQUAMOUS # UR AUTO: 0 /HPF — SIGNIFICANT CHANGE UP (ref 0–5)
TROPONIN T, HIGH SENSITIVITY RESULT: 70 NG/L — HIGH (ref 0–51)
TROPONIN T, HIGH SENSITIVITY RESULT: 75 NG/L — HIGH (ref 0–51)
UROBILINOGEN FLD QL: 0.2 MG/DL — SIGNIFICANT CHANGE UP (ref 0.2–1)
WBC # BLD: 5.18 K/UL — SIGNIFICANT CHANGE UP (ref 3.8–10.5)
WBC # FLD AUTO: 5.18 K/UL — SIGNIFICANT CHANGE UP (ref 3.8–10.5)
WBC UR QL: 0 /HPF — SIGNIFICANT CHANGE UP (ref 0–5)

## 2025-01-07 PROCEDURE — 87637 SARSCOV2&INF A&B&RSV AMP PRB: CPT

## 2025-01-07 PROCEDURE — 82947 ASSAY GLUCOSE BLOOD QUANT: CPT

## 2025-01-07 PROCEDURE — 99223 1ST HOSP IP/OBS HIGH 75: CPT

## 2025-01-07 PROCEDURE — 96374 THER/PROPH/DIAG INJ IV PUSH: CPT

## 2025-01-07 PROCEDURE — 87086 URINE CULTURE/COLONY COUNT: CPT

## 2025-01-07 PROCEDURE — 99261: CPT

## 2025-01-07 PROCEDURE — 86922 COMPATIBILITY TEST ANTIGLOB: CPT

## 2025-01-07 PROCEDURE — 80053 COMPREHEN METABOLIC PANEL: CPT

## 2025-01-07 PROCEDURE — 83550 IRON BINDING TEST: CPT

## 2025-01-07 PROCEDURE — 71275 CT ANGIOGRAPHY CHEST: CPT | Mod: 26,MC

## 2025-01-07 PROCEDURE — 83540 ASSAY OF IRON: CPT

## 2025-01-07 PROCEDURE — 99285 EMERGENCY DEPT VISIT HI MDM: CPT | Mod: 25

## 2025-01-07 PROCEDURE — 36430 TRANSFUSION BLD/BLD COMPNT: CPT

## 2025-01-07 PROCEDURE — 84295 ASSAY OF SERUM SODIUM: CPT

## 2025-01-07 PROCEDURE — 74176 CT ABD & PELVIS W/O CONTRAST: CPT | Mod: 26,59

## 2025-01-07 PROCEDURE — 84132 ASSAY OF SERUM POTASSIUM: CPT

## 2025-01-07 PROCEDURE — 80048 BASIC METABOLIC PNL TOTAL CA: CPT

## 2025-01-07 PROCEDURE — P9016: CPT

## 2025-01-07 PROCEDURE — 82803 BLOOD GASES ANY COMBINATION: CPT

## 2025-01-07 PROCEDURE — 74174 CTA ABD&PLVS W/CONTRAST: CPT | Mod: 26,MC

## 2025-01-07 PROCEDURE — 99222 1ST HOSP IP/OBS MODERATE 55: CPT

## 2025-01-07 PROCEDURE — 83605 ASSAY OF LACTIC ACID: CPT

## 2025-01-07 PROCEDURE — 97163 PT EVAL HIGH COMPLEX 45 MIN: CPT

## 2025-01-07 PROCEDURE — 86900 BLOOD TYPING SEROLOGIC ABO: CPT

## 2025-01-07 PROCEDURE — 82330 ASSAY OF CALCIUM: CPT

## 2025-01-07 PROCEDURE — 85610 PROTHROMBIN TIME: CPT

## 2025-01-07 PROCEDURE — 83735 ASSAY OF MAGNESIUM: CPT

## 2025-01-07 PROCEDURE — 84100 ASSAY OF PHOSPHORUS: CPT

## 2025-01-07 PROCEDURE — 82728 ASSAY OF FERRITIN: CPT

## 2025-01-07 PROCEDURE — 81001 URINALYSIS AUTO W/SCOPE: CPT

## 2025-01-07 PROCEDURE — 85730 THROMBOPLASTIN TIME PARTIAL: CPT

## 2025-01-07 PROCEDURE — 84466 ASSAY OF TRANSFERRIN: CPT

## 2025-01-07 PROCEDURE — 85014 HEMATOCRIT: CPT

## 2025-01-07 PROCEDURE — 71045 X-RAY EXAM CHEST 1 VIEW: CPT

## 2025-01-07 PROCEDURE — 82435 ASSAY OF BLOOD CHLORIDE: CPT

## 2025-01-07 PROCEDURE — 84484 ASSAY OF TROPONIN QUANT: CPT

## 2025-01-07 PROCEDURE — 85018 HEMOGLOBIN: CPT

## 2025-01-07 PROCEDURE — 86880 COOMBS TEST DIRECT: CPT

## 2025-01-07 PROCEDURE — 96375 TX/PRO/DX INJ NEW DRUG ADDON: CPT

## 2025-01-07 PROCEDURE — 86870 RBC ANTIBODY IDENTIFICATION: CPT

## 2025-01-07 PROCEDURE — 85027 COMPLETE CBC AUTOMATED: CPT

## 2025-01-07 PROCEDURE — 36415 COLL VENOUS BLD VENIPUNCTURE: CPT

## 2025-01-07 PROCEDURE — 85025 COMPLETE CBC W/AUTO DIFF WBC: CPT

## 2025-01-07 PROCEDURE — 86901 BLOOD TYPING SEROLOGIC RH(D): CPT

## 2025-01-07 PROCEDURE — 87040 BLOOD CULTURE FOR BACTERIA: CPT

## 2025-01-07 PROCEDURE — 93005 ELECTROCARDIOGRAM TRACING: CPT

## 2025-01-07 PROCEDURE — 71045 X-RAY EXAM CHEST 1 VIEW: CPT | Mod: 26

## 2025-01-07 PROCEDURE — 86850 RBC ANTIBODY SCREEN: CPT

## 2025-01-07 RX ORDER — PANTOPRAZOLE 40 MG/1
40 TABLET, DELAYED RELEASE ORAL
Refills: 0 | Status: DISCONTINUED | OUTPATIENT
Start: 2025-01-07 | End: 2025-01-14

## 2025-01-07 RX ORDER — GINKGO BILOBA 40 MG
3 CAPSULE ORAL AT BEDTIME
Refills: 0 | Status: DISCONTINUED | OUTPATIENT
Start: 2025-01-07 | End: 2025-01-14

## 2025-01-07 RX ORDER — HEPARIN SODIUM 1000 [USP'U]/ML
5000 INJECTION, SOLUTION INTRAVENOUS; SUBCUTANEOUS EVERY 8 HOURS
Refills: 0 | Status: DISCONTINUED | OUTPATIENT
Start: 2025-01-07 | End: 2025-01-07

## 2025-01-07 RX ORDER — TAMSULOSIN HYDROCHLORIDE 0.4 MG/1
0.4 CAPSULE ORAL AT BEDTIME
Refills: 0 | Status: DISCONTINUED | OUTPATIENT
Start: 2025-01-07 | End: 2025-01-14

## 2025-01-07 RX ORDER — LEVETIRACETAM 100 MG/ML
1 SOLUTION ORAL
Refills: 0 | DISCHARGE

## 2025-01-07 RX ORDER — ASPIRIN 81 MG
1 TABLET, DELAYED RELEASE (ENTERIC COATED) ORAL
Refills: 0 | DISCHARGE

## 2025-01-07 RX ORDER — OSELTAMIVIR 75 MG/1
30 CAPSULE ORAL DAILY
Refills: 0 | Status: DISCONTINUED | OUTPATIENT
Start: 2025-01-07 | End: 2025-01-07

## 2025-01-07 RX ORDER — MAG HYDROX/ALUMINUM HYD/SIMETH 200-200-20
30 SUSPENSION, ORAL (FINAL DOSE FORM) ORAL EVERY 4 HOURS
Refills: 0 | Status: DISCONTINUED | OUTPATIENT
Start: 2025-01-07 | End: 2025-01-14

## 2025-01-07 RX ORDER — HEPARIN SODIUM 1000 [USP'U]/ML
5000 INJECTION, SOLUTION INTRAVENOUS; SUBCUTANEOUS EVERY 12 HOURS
Refills: 0 | Status: DISCONTINUED | OUTPATIENT
Start: 2025-01-07 | End: 2025-01-14

## 2025-01-07 RX ORDER — MORPHINE SULFATE 15 MG
4 TABLET, EXTENDED RELEASE ORAL ONCE
Refills: 0 | Status: DISCONTINUED | OUTPATIENT
Start: 2025-01-07 | End: 2025-01-07

## 2025-01-07 RX ORDER — SENNOSIDES 8.6 MG/1
2 TABLET, FILM COATED ORAL AT BEDTIME
Refills: 0 | Status: DISCONTINUED | OUTPATIENT
Start: 2025-01-07 | End: 2025-01-14

## 2025-01-07 RX ORDER — HYDROMORPHONE HCL 4 MG
1 TABLET ORAL
Refills: 0 | Status: DISCONTINUED | OUTPATIENT
Start: 2025-01-07 | End: 2025-01-11

## 2025-01-07 RX ORDER — ATORVASTATIN CALCIUM 40 MG/1
40 TABLET, FILM COATED ORAL AT BEDTIME
Refills: 0 | Status: DISCONTINUED | OUTPATIENT
Start: 2025-01-07 | End: 2025-01-14

## 2025-01-07 RX ORDER — HYDRALAZINE HYDROCHLORIDE 10 MG/1
100 TABLET ORAL
Refills: 0 | Status: DISCONTINUED | OUTPATIENT
Start: 2025-01-07 | End: 2025-01-09

## 2025-01-07 RX ORDER — HYDROMORPHONE HCL 4 MG
2 TABLET ORAL
Refills: 0 | Status: DISCONTINUED | OUTPATIENT
Start: 2025-01-07 | End: 2025-01-11

## 2025-01-07 RX ORDER — CEFTRIAXONE SODIUM 1 G/1
INJECTION, POWDER, FOR SOLUTION INTRAMUSCULAR; INTRAVENOUS
Refills: 0 | Status: DISCONTINUED | OUTPATIENT
Start: 2025-01-07 | End: 2025-01-10

## 2025-01-07 RX ORDER — ISOSORBIDE MONONITRATE 120 MG
60 TABLET, EXTENDED RELEASE 24 HR ORAL DAILY
Refills: 0 | Status: DISCONTINUED | OUTPATIENT
Start: 2025-01-07 | End: 2025-01-14

## 2025-01-07 RX ORDER — ONDANSETRON 4 MG/1
4 TABLET ORAL ONCE
Refills: 0 | Status: COMPLETED | OUTPATIENT
Start: 2025-01-07 | End: 2025-01-07

## 2025-01-07 RX ORDER — CLOPIDOGREL BISULFATE 75 MG/1
75 TABLET, FILM COATED ORAL DAILY
Refills: 0 | Status: DISCONTINUED | OUTPATIENT
Start: 2025-01-07 | End: 2025-01-07

## 2025-01-07 RX ORDER — GUAIFENESIN/DEXTROMETHORPHAN 200-10MG/5
10 LIQUID (ML) ORAL EVERY 4 HOURS
Refills: 0 | Status: DISCONTINUED | OUTPATIENT
Start: 2025-01-07 | End: 2025-01-14

## 2025-01-07 RX ORDER — ACETAMINOPHEN 80 MG/.8ML
650 SOLUTION/ DROPS ORAL EVERY 6 HOURS
Refills: 0 | Status: DISCONTINUED | OUTPATIENT
Start: 2025-01-07 | End: 2025-01-14

## 2025-01-07 RX ORDER — BENZONATATE 100 MG
100 CAPSULE ORAL EVERY 8 HOURS
Refills: 0 | Status: DISCONTINUED | OUTPATIENT
Start: 2025-01-07 | End: 2025-01-14

## 2025-01-07 RX ORDER — CEFTRIAXONE SODIUM 1 G/1
2000 INJECTION, POWDER, FOR SOLUTION INTRAMUSCULAR; INTRAVENOUS EVERY 24 HOURS
Refills: 0 | Status: DISCONTINUED | OUTPATIENT
Start: 2025-01-08 | End: 2025-01-10

## 2025-01-07 RX ORDER — METOCLOPRAMIDE 10 MG/1
10 TABLET ORAL EVERY 6 HOURS
Refills: 0 | Status: DISCONTINUED | OUTPATIENT
Start: 2025-01-07 | End: 2025-01-14

## 2025-01-07 RX ORDER — METOCLOPRAMIDE 10 MG/1
10 TABLET ORAL ONCE
Refills: 0 | Status: COMPLETED | OUTPATIENT
Start: 2025-01-07 | End: 2025-01-07

## 2025-01-07 RX ORDER — ONDANSETRON 4 MG/1
4 TABLET ORAL EVERY 8 HOURS
Refills: 0 | Status: DISCONTINUED | OUTPATIENT
Start: 2025-01-07 | End: 2025-01-07

## 2025-01-07 RX ORDER — ASPIRIN 81 MG
81 TABLET, DELAYED RELEASE (ENTERIC COATED) ORAL DAILY
Refills: 0 | Status: DISCONTINUED | OUTPATIENT
Start: 2025-01-07 | End: 2025-01-07

## 2025-01-07 RX ORDER — HYDROMORPHONE HCL 4 MG
0.5 TABLET ORAL
Refills: 0 | Status: DISCONTINUED | OUTPATIENT
Start: 2025-01-07 | End: 2025-01-07

## 2025-01-07 RX ORDER — LEVETIRACETAM 100 MG/ML
500 SOLUTION ORAL
Refills: 0 | Status: DISCONTINUED | OUTPATIENT
Start: 2025-01-07 | End: 2025-01-08

## 2025-01-07 RX ORDER — HYDROMORPHONE HCL 4 MG
0.2 TABLET ORAL
Refills: 0 | Status: DISCONTINUED | OUTPATIENT
Start: 2025-01-07 | End: 2025-01-07

## 2025-01-07 RX ORDER — TORSEMIDE 10 MG/1
10 TABLET ORAL DAILY
Refills: 0 | Status: DISCONTINUED | OUTPATIENT
Start: 2025-01-07 | End: 2025-01-09

## 2025-01-07 RX ORDER — OSELTAMIVIR 75 MG/1
30 CAPSULE ORAL
Refills: 0 | Status: COMPLETED | OUTPATIENT
Start: 2025-01-07 | End: 2025-01-11

## 2025-01-07 RX ORDER — ONDANSETRON 4 MG/1
4 TABLET ORAL EVERY 6 HOURS
Refills: 0 | Status: DISCONTINUED | OUTPATIENT
Start: 2025-01-07 | End: 2025-01-14

## 2025-01-07 RX ORDER — CEFTRIAXONE SODIUM 1 G/1
2000 INJECTION, POWDER, FOR SOLUTION INTRAMUSCULAR; INTRAVENOUS ONCE
Refills: 0 | Status: COMPLETED | OUTPATIENT
Start: 2025-01-07 | End: 2025-01-07

## 2025-01-07 RX ORDER — SODIUM CHLORIDE 9 MG/ML
1000 INJECTION, SOLUTION INTRAMUSCULAR; INTRAVENOUS; SUBCUTANEOUS ONCE
Refills: 0 | Status: COMPLETED | OUTPATIENT
Start: 2025-01-07 | End: 2025-01-07

## 2025-01-07 RX ORDER — PANTOPRAZOLE 40 MG/1
40 TABLET, DELAYED RELEASE ORAL
Refills: 0 | Status: DISCONTINUED | OUTPATIENT
Start: 2025-01-07 | End: 2025-01-07

## 2025-01-07 RX ORDER — CLOPIDOGREL BISULFATE 75 MG/1
1 TABLET, FILM COATED ORAL
Refills: 0 | DISCHARGE

## 2025-01-07 RX ORDER — CLONIDINE HYDROCHLORIDE 0.2 MG/1
0.1 TABLET ORAL
Refills: 0 | Status: DISCONTINUED | OUTPATIENT
Start: 2025-01-07 | End: 2025-01-14

## 2025-01-07 RX ORDER — SUCRALFATE 1 G/10ML
1 SUSPENSION ORAL ONCE
Refills: 0 | Status: COMPLETED | OUTPATIENT
Start: 2025-01-07 | End: 2025-01-07

## 2025-01-07 RX ORDER — OSELTAMIVIR 75 MG/1
30 CAPSULE ORAL ONCE
Refills: 0 | Status: DISCONTINUED | OUTPATIENT
Start: 2025-01-07 | End: 2025-01-07

## 2025-01-07 RX ORDER — CARVEDILOL 25 MG/1
12.5 TABLET, FILM COATED ORAL EVERY 12 HOURS
Refills: 0 | Status: DISCONTINUED | OUTPATIENT
Start: 2025-01-07 | End: 2025-01-10

## 2025-01-07 RX ADMIN — Medication 0.5 MILLIGRAM(S): at 13:36

## 2025-01-07 RX ADMIN — ONDANSETRON 4 MILLIGRAM(S): 4 TABLET ORAL at 02:48

## 2025-01-07 RX ADMIN — Medication 4 MILLIGRAM(S): at 02:46

## 2025-01-07 RX ADMIN — OSELTAMIVIR 30 MILLIGRAM(S): 75 CAPSULE ORAL at 15:33

## 2025-01-07 RX ADMIN — SODIUM CHLORIDE 1000 MILLILITER(S): 9 INJECTION, SOLUTION INTRAMUSCULAR; INTRAVENOUS; SUBCUTANEOUS at 03:59

## 2025-01-07 RX ADMIN — Medication 0.5 MILLIGRAM(S): at 16:37

## 2025-01-07 RX ADMIN — METOCLOPRAMIDE 10 MILLIGRAM(S): 10 TABLET ORAL at 06:11

## 2025-01-07 RX ADMIN — ONDANSETRON 4 MILLIGRAM(S): 4 TABLET ORAL at 20:25

## 2025-01-07 RX ADMIN — Medication 4 MILLIGRAM(S): at 08:30

## 2025-01-07 RX ADMIN — SUCRALFATE 1 GRAM(S): 1 SUSPENSION ORAL at 04:50

## 2025-01-07 RX ADMIN — Medication 4 MILLIGRAM(S): at 04:50

## 2025-01-07 RX ADMIN — Medication 4 MILLIGRAM(S): at 08:53

## 2025-01-07 RX ADMIN — Medication 4 MILLIGRAM(S): at 11:45

## 2025-01-07 RX ADMIN — ONDANSETRON 4 MILLIGRAM(S): 4 TABLET ORAL at 05:28

## 2025-01-07 RX ADMIN — Medication 1 MILLIGRAM(S): at 22:00

## 2025-01-07 RX ADMIN — CEFTRIAXONE SODIUM 2000 MILLIGRAM(S): 1 INJECTION, POWDER, FOR SOLUTION INTRAMUSCULAR; INTRAVENOUS at 15:29

## 2025-01-07 RX ADMIN — Medication 1 MILLIGRAM(S): at 18:38

## 2025-01-07 RX ADMIN — ONDANSETRON 4 MILLIGRAM(S): 4 TABLET ORAL at 13:39

## 2025-01-07 NOTE — ED PROVIDER NOTE - OBJECTIVE STATEMENT
A 65 yo M with Afib, s/p watchman, no longer on AC, ESRD on HD T/Th/Sa via left UE AFV, chronic anemia, CAD s/p CABG, aortic & mitral valve replacement, aortic dissection s/p repair, ischemic bowel s/p resection, seizure disorder on Keppra, IE of MV with MSSA and s/p course of Ancef with f/u MUSTAPHA negative for vegetation in 23', presents to the ED for 2 days nausea, vomiting, and periumbilical pain. Reports having normal BM, last was . Denies fevers, chills, headache, chest pain, palpitations, shortness of breath, cough, diarrhea, hematuria, dysuria, dark stools, focal neurologic symptoms.

## 2025-01-07 NOTE — ED ADULT NURSE REASSESSMENT NOTE - NS ED NURSE REASSESS COMMENT FT1
Assumed care from AGUSTO CARRENO at 0730, Patient A&Ox4  continues to be on cardiac monitor in ST, resting in bed, verbalized improvement in lower abdominal pain now rated 4/10, partial relief, respirations even and  unlabored on RA, awaiting urine sample.

## 2025-01-07 NOTE — ED ADULT NURSE NOTE - CHIEF COMPLAINT QUOTE
c/o vomiting, abd pain x 2 days. "I haven't taken heart medication in 2 days" Hx quadruple bypass. Hx of HD Tues/Thurs/Sat

## 2025-01-07 NOTE — H&P ADULT - NSHPPHYSICALEXAM_GEN_ALL_CORE
GENERAL: pt examined bedside, toxic appearing and in mild distress   HEENT: NC/AT, clear nasal discharge, dry oral mucosa, clear conjunctiva, sclera nonicteric  RESPIRATORY: poor inspiratory effort, scattered rales, no wheezing or rhonchi   CARDIOVASCULAR: fast rate regular rhythm, normal S1 and S2, +murmurs appreciated  ABDOMEN: soft, mildly distended, nontender, +bowel sounds, no rebound/guarding/peritoneal signs  MSK: No joint deformities, edema, erythema  EXTREMITIES: No cynaosis, no clubbing, no lower extremity edema, LUE AVF  PSYCH: affect appropriate and cooperative  NEUROLOGY: A+O to person, place, and time, no focal neurologic deficits appreciated  SKIN: No rashes or no palpable lesions

## 2025-01-07 NOTE — ED ADULT NURSE REASSESSMENT NOTE - NS ED NURSE REASSESS COMMENT FT1
Patient a&Ox4 resting inn bed, continues to be on cardiac monitor in ST rhythm, continues to be on 2l oxygen via NC SPO2 97%, patient getting admitted for Influenza. Patient c/o lower abdominal pain, MD Mullins at bedside, awaiting admission.

## 2025-01-07 NOTE — ED PROVIDER NOTE - NSPTACCESSSVCSAPPTDETAILS_ED_ALL_ED_FT
Redemonstration of 1.5 cm sized enhancing right renal lesion, concerning   for neoplasm on CT imaging of the abdomen. Please arrange follow up.

## 2025-01-07 NOTE — ED ADULT NURSE NOTE - NSFALLUNIVINTERV_ED_ALL_ED
Bed/Stretcher in lowest position, wheels locked, appropriate side rails in place/Call bell, personal items and telephone in reach/Instruct patient to call for assistance before getting out of bed/chair/stretcher/Non-slip footwear applied when patient is off stretcher/[place] to call system/Physically safe environment - no spills, clutter or unnecessary equipment/Purposeful proactive rounding/Room/bathroom lighting operational, light cord in reach

## 2025-01-07 NOTE — H&P ADULT - NSHPLABSRESULTS_GEN_ALL_CORE
Problem: Communication  Goal: The ability to communicate needs accurately and effectively will improve    Intervention: Reorient patient to environment as needed   12/29/17 0212   OTHER   Oriented to: Unit Routine;Call Light & Bedside Controls;Bedside Rail Policy;Patient Rights and Responsibilities     Intervention: Educate patient and significant other/support system about the plan of care, procedures, treatments, medications and allow for questions  Pt very agitated    12/29/17 0212   OTHER   Pt & Family Have Been Educated on Methods Available to Report Concerns Related to Care, Treatment, Services, and Patient Safety Issues Unable at this Time (Comments)     Intervention: Use communication aids and/or /Language Line as appropriate   12/29/17 0212   Special Assistance Needs   Patient's Primary Language English   Hearing Impairment Both Ears         Problem: Safety  Goal: Will remain free from injury  Outcome: PROGRESSING AS EXPECTED  Bed alarm in use, call light in reach, hourly rounding  Intervention: Provide assistance with mobility   12/29/17 0100   OTHER   Assistance / Tolerance Assistance of One     Intervention: Collaborate with Interdisciplinary Team for safe transfer and mobilization techniques   12/29/17 0100   OTHER   Assistive Devices Hand held assist         Problem: Bowel/Gastric:  Goal: Normal bowel function is maintained or improved  Outcome: PROGRESSING SLOWER THAN EXPECTED   12/29/17 0212   OTHER   Last BM 12/29/17   Number of Times Stooled 130   Incontinent of stool, sample sent to lab for occult blood       9.6    5.18  )-----------( 106      ( 07 Jan 2025 03:00 )             29.3       01-07    140  |  100  |  72.0[H]  ----------------------------<  101[H]  4.4   |  20.0[L]  |  7.95[H]    Ca    8.7      07 Jan 2025 03:00    TPro  7.4  /  Alb  4.0  /  TBili  0.9  /  DBili  x   /  AST  21  /  ALT  11  /  AlkPhos  94  01-07        < from: CT Angio Abdomen and Pelvis w/ IV Cont (01.07.25 @ 05:53) >    IMPRESSION:  Known aortic dissection from the aortic arch extending to the right   external iliac artery, right brachiocephalic and left subclavian arteries   and bilateral renal arteries. No interval progression.    Redemonstration of 1.5 cm sized enhancing right renal lesion, concerning   for neoplasm.  Wedge-shaped hypodensity in the right kidney-the possibility of infection   cannot be excluded.    Moderate ascites, new since prior.    Nodular infiltration in the left lower lobe, concerning for pneumonia.    < end of copied text >

## 2025-01-07 NOTE — PATIENT PROFILE ADULT - FALL HARM RISK - HARM RISK INTERVENTIONS

## 2025-01-07 NOTE — ED PROVIDER NOTE - PROGRESS NOTE DETAILS
Marine PEPE: Patient with persistent tachycardia and lower abdominal pain despite IV fluids and pain control.  Given history of aortic dissection, will obtain CT angio chest abdomen pelvis to evaluate dissection.  Will obtain CT scan with contrast despite history of renal failure/ESRD given the  benefit of ruling out dissection outweighs potential risk associated with contrast-induced nephropathy. Patient to have dialysis today.

## 2025-01-07 NOTE — ED PROVIDER NOTE - ATTENDING CONTRIBUTION TO CARE
Marine: I performed a face to face bedside interview with patient regarding history of present illness, review of symptoms and past medical history. I completed an independent physical exam.  I have discussed patient's plan of care with resident.   I agree with note as stated above including HISTORY OF PRESENT ILLNESS, HIV, PAST MEDICAL/SURGICAL/FAMILY/SOCIAL HISTORY, ALLERGIES AND HOME MEDICATIONS, REVIEW OF SYSTEMS, PHYSICAL EXAM, MEDICAL DECISION MAKING and any PROGRESS NOTES during the time I functioned as the attending physician for this patient unless otherwise noted. My brief assessment is as follows: Marine PEPE: 64-year-old male history of A-fib, watchman's procedure, ESRD on HD, CAD status post CABG, aortic dissection s/p repair ischemic bowel s/p resection presenting for 2 days of nausea with multiple episodes of NBNB emesis with periumbilical/suprapubic pain.  Normal BMs, no diarrhea.  Denies fever, chest pain, shortness of breath.  Has not missed dialysis.  Exam notable for patient in mild discomfort secondary to pain, tachycardic, diffuse lower abdominal tenderness to palpation.  Plan for labs, symptom control, IV fluids, antiemetics, CTAP and reassess.

## 2025-01-07 NOTE — ED PROVIDER NOTE - PHYSICAL EXAMINATION
VITALS: reviewed  GEN: No apparent distress, A & O x 4  HEAD/EYES: NC/AT, PERRL, EOMI, anicteric sclerae, no conjunctival pallor  ENT: mucus membranes moist, trachea midline, no JVD, neck is supple  RESP: lungs CTA with equal breath sounds bilaterally, chest wall nontender and atraumatic  CV: heart with reg rhythm S1, S2, no murmur; distal pulses intact and symmetric bilaterally  ABDOMEN: TTP on periumbilical area, normoactive bowel sounds, soft, nondistended, no palpable masses  : no CVAT  MSK: extremities atraumatic and nontender, no edema, no asymmetry. the back is without midline or lateral tenderness, there is no spinal deformity or stepoff and the back is ranged painlessly. the neck has no midline tenderness, deformity, or stepoff, and is ranged painlessly.  SKIN: warm, dry, no rash, no bruising, no cyanosis.  NEURO: alert, mentating appropriately, no facial asymmetry. gross sensation, motor, coordination are intact  PSYCH: Affect appropriate

## 2025-01-07 NOTE — H&P ADULT - HISTORY OF PRESENT ILLNESS
63 y/o M w/ PMH of Afib (s/p watchman and no longer on AC), ESRD (on HD T/Th/Sa; LUE AFV), chronic anemia, chronic thrombocytopenia, rectal bleeding, CAD s/p CABG, aortic & mitral valve replacement, Type A aortic dissection s/p repair complicated by ischemic bowel s/p resection, seizure disorder on Keppra, IE of MV with MSSA and s/p course of Ancef with f/u MUSTAPHA negative for vegetation in 23', presents to the ED for 2 days of sob, nasal congestion, nonproductive cough w/ associated N/V and abd pain.  Pain is periumbilical and emesis is NBNB.  He reports subjective fevers and chills.  He denies diarrhea, sick contacts or recent travel.  Pt also denies cp, palpitations, LE edema and denies black/dark stools or dysuria.

## 2025-01-07 NOTE — H&P ADULT - ASSESSMENT
65 y/o M w/ PMH of Afib (s/p watchman and no longer on AC), ESRD (on HD T/Th/Sa; LUE AFV), chronic anemia, chronic thrombocytopenia, rectal bleeding, CAD s/p CABG, aortic & mitral valve replacement, Type A aortic dissection s/p repair complicated by ischemic bowel s/p resection, seizure disorder on Keppra, IE of MV with MSSA and s/p course of Ancef with f/u MUSTAPHA negative for vegetation in 23', presents to the ED for 2 days of sob, nasal congestion, nonproductive cough w/ associated N/V and abd pain.  Pain is periumbilical and emesis is NBNB.  Vitals notable for sinus tachy in 120's, tachypnea and O2 sat 80% ORA, now on 4L NC and sat'ing 95%.  Clinically toxic appearing w/ clear nasal discharge, poor inspiratory effort, scattered rales, and benign abd exam.  Initial w/u significant for trop 70, BNP>33K, AGMA 20, HCO3 20, BUN 72, Cr 7.95, lipase 7 and lactate 1.0, no acute ischemic chanes on EKG. UA w/out pyuria.  CTA c/a/p reporting new moderate ascites, possible right pyelo and LLL infiltrate.  Recievied 1L IVNS, bentyl 10mgx1, reglan 10mg x1, morphine 4mg x3, zofran 4mg x2, sucralfate 1g x1 in ED.  Will admit for acute hypoxic respiratory failure and sepsis 2/2 Flu A.      Acute hypoxic respiratory failure 2/2 viral PNA  Sepsis 2/2 Flu A   - Sinus tachy, tachypnea but no leukocytosis and lactate normal   - Requiring 4L supplemental O2 to maintain O2 sat at 95%  - Viral panel positive for Flu A therefore will start tamiflu   - CTA chest w/ LLL infiltrate but likely from viral PNA but will check sputum culture and strep UAg  - Will order blood cultures and MRSA pcr as well   - CT a/p also reporting ascites therefore will order abd US to assess if enough fluid for paracentesis to r/o SBP but will empirically treat w/ rocephin and consult GI   - Defer procal as unreliable in setting of renal dysfunction   - CT a/p w/ report of possible pyelo but UA w/out pyuria  - Supportive care with cough suppressant and expectorant       Abdominal pain, N/V possible 2/2 above vs ascites   - Mildly distended but soft and nontender, no guarding/rigidity/peritoneal signs   - No diarrhea reported   - Lipase normal and lactate normal   - Liver, bile ducts and GB WNL on CT a/p   - Could be due to influenza however has new moderate ascites on CT a/p   - Will order abdominal US to better assess if enough fluid for diagnostic paracentesis and if so will place IR consult     - Will cover w/ rocephin empirically for SBP and consult GI   - Hold ASA and plavix for now pending abd US and possible paracentesis (last took asa/plavix 1/06/25)  - Has hx of ischemic bowel but lactate normal, benign and CT a/p w/ no significant acute findings   - c/w anti-emetics and analgesics prn   - Serial abd exams and if worsens repeat stat lactate       Chronic normocytic anemia   Chronic thrombocytopenia  - H/H currently better than baseline (ranges 7-8.5) but suspect hemoconcentrated   - Plts at baseline and on average range from 50's-150's w/ last plt 12/14/24 noted to be 66    - Hemodynamically stable w/ no active bleeding at this time but was admitted for rectal bleeding 11/2024   - c/w PPI for gi cytoprotection and avoid NSAIDs   - Will check iron panel, b12 and folate given RDW  - Monitor CBC and transfuse for Hb<8      Afib  - Currently in sinus tachy on monitor and likely from sepsis   - s/p watchman and no longer on AC  - c/w carvedilol for rate control       CAD s/p CABG  HTN / HLD  - Hold ASA, plavix for possible paracentesis (last took 1/6/25)  - c/w statin therapy   - c/w clonidine, isosorbide, carvedilol, amlodipine, torsemide      Seizure disorder   - c/w keppra       BPH  - c/w flomax       Incidental CT finding   - Readministration of 1.5 cm sized enhancing right renal lesion, concerning for neoplasm  - Refer to heme/onc on discharge         VTE ppx:  heparin sq bid (as pt is on DAPT)     Dispo: Acute.  Low threshold for upgrade if pt decompensates further or MICU consult if needed.    63 y/o M w/ PMH of Afib (s/p watchman and no longer on AC), ESRD (on HD T/Th/Sa; LUE AFV), chronic anemia, chronic thrombocytopenia, rectal bleeding, CAD s/p CABG, aortic & mitral valve replacement, Type A aortic dissection s/p repair complicated by ischemic bowel s/p resection, seizure disorder on Keppra, IE of MV with MSSA and s/p course of Ancef with f/u MUSTAPHA negative for vegetation in 23', presents to the ED for 2 days of sob, nasal congestion, nonproductive cough w/ associated N/V and abd pain.  Pain is periumbilical and emesis is NBNB.  Vitals notable for sinus tachy in 120's, tachypnea and O2 sat 80% ORA, now on 4L NC and sat'ing 95%.  Clinically toxic appearing w/ clear nasal discharge, poor inspiratory effort, scattered rales, and benign abd exam.  Initial w/u significant for trop 70, BNP>33K, AGMA 20, HCO3 20, BUN 72, Cr 7.95, lipase 7 and lactate 1.0, no acute ischemic changes on EKG. UA w/out pyuria.  CTA c/a/p reporting new moderate ascites, possible right pyelo and LLL infiltrate.  Received 1L IVNS, bentyl 10mgx1, reglan 10mg x1, morphine 4mg x3, zofran 4mg x2, sucralfate 1g x1 in ED.  Will admit for acute hypoxic respiratory failure and sepsis 2/2 Flu A.      Acute hypoxic respiratory failure 2/2 viral PNA  Sepsis 2/2 Flu A   - Sinus tachy, tachypnea but no leukocytosis and lactate normal   - Requiring 4L supplemental O2 to maintain O2 sat at 95%  - Viral panel positive for Flu A therefore will start tamiflu   - CTA chest w/ LLL infiltrate but likely from viral PNA but will check sputum culture and strep UAg  - Will order blood cultures and MRSA pcr as well   - CT a/p also reporting ascites therefore will order abd US to assess if enough fluid for paracentesis to r/o SBP but will empirically treat w/ rocephin and consult GI   - Defer procal as unreliable in setting of renal dysfunction   - CT a/p w/ report of possible pyelo but UA w/out pyuria  - Supportive care with cough suppressant and expectorant       Abdominal pain, N/V possible 2/2 above vs ascites   - Mildly distended but soft and nontender, no guarding/rigidity/peritoneal signs   - No diarrhea reported   - Lipase normal and lactate normal   - Liver, bile ducts and GB WNL on CT a/p   - Could be due to influenza however has new moderate ascites on CT a/p   - Will order abdominal US to better assess if enough fluid for diagnostic paracentesis and if so will place IR consult     - Will cover w/ rocephin empirically for SBP and consult GI   - Hold ASA and plavix for now pending abd US and possible paracentesis (last took asa/plavix 1/06/25)  - Has hx of ischemic bowel but lactate normal, benign and CT a/p w/ no significant acute findings   - c/w anti-emetics and analgesics prn   - Serial abd exams and if worsens repeat stat lactate       Type II MI likely demand 2/2 above vs poor renal clearance   - Trop 70 and BNP >33K but no chest pain   - EKG w/ no acute ischemic changes  - Stat repeat trop ordered   - Repeat EKG ordered to assess for dynamic changes   - If trop notably increases or EKG changes will consult cardio and w/u further   - If trop flat or down trending no further w/u for now   - Monitor on telemetry       AMGA likely 2/2 uremia, sepsis and hypoxia  - Anticipate will improve s/p HD and now that sating well on O2  - Serum HCO3 mildly low and Lactate normal   - Repeat CMP tomorrow to reassess       ESRD   - On HD t/th/sa and due for HD today   - Monitor I/Os   - Renally dose medications   - Nephro consulted       Chronic normocytic anemia   Chronic thrombocytopenia  - H/H currently better than baseline (ranges 7-8.5) but suspect hemoconcentrated   - Plts at baseline and on average range from 50's-150's w/ last plt 12/14/24 noted to be 66    - Hemodynamically stable w/ no active bleeding at this time but was admitted for rectal bleeding 11/2024   - c/w PPI for gi cytoprotection and avoid NSAIDs   - Will check iron panel, b12 and folate given RDW  - Monitor CBC and transfuse for Hb<8      Afib  - Currently in sinus tachy on monitor and likely from sepsis   - s/p watchman and no longer on AC  - c/w carvedilol for rate control       CAD s/p CABG  HTN / HLD  - Hold ASA, plavix for possible paracentesis (last took 1/6/25)  - c/w statin therapy   - c/w clonidine, isosorbide, carvedilol, amlodipine, torsemide      Seizure disorder   - c/w keppra       BPH  - c/w flomax       Incidental CT finding   - Readministration of 1.5 cm sized enhancing right renal lesion, concerning for neoplasm  - Refer to heme/onc on discharge         VTE ppx:  heparin sq bid (as pt is on DAPT)     Dispo: Acute.  Low threshold for upgrade if pt decompensates further or MICU consult if needed.    63 y/o M w/ PMH of Afib (s/p watchman and no longer on AC), ESRD (on HD T/Th/Sa; LUE AFV), chronic anemia, chronic thrombocytopenia, rectal bleeding, CAD s/p CABG, aortic & mitral valve replacement, Type A aortic dissection s/p repair complicated by ischemic bowel s/p resection, seizure disorder on Keppra, IE of MV with MSSA and s/p course of Ancef with f/u MUSTAPHA negative for vegetation in 23', presents to the ED for 2 days of sob, nasal congestion, nonproductive cough w/ associated N/V and abd pain.  Pain is periumbilical and emesis is NBNB.  Vitals notable for sinus tachy in 120's, tachypnea and O2 sat 80% ORA, now on 4L NC and sat'ing 95%.  Clinically toxic appearing w/ clear nasal discharge, poor inspiratory effort, scattered rales, and benign abd exam.  Initial w/u significant for trop 70, BNP>33K, AGMA 20, HCO3 20, BUN 72, Cr 7.95, lipase 7 and lactate 1.0, no acute ischemic changes on EKG. UA w/out pyuria.  CTA c/a/p reporting new moderate ascites, possible right pyelo and LLL infiltrate.  Received 1L IVNS, bentyl 10mgx1, reglan 10mg x1, morphine 4mg x3, zofran 4mg x2, sucralfate 1g x1 in ED.  Will admit for acute hypoxic respiratory failure and sepsis 2/2 Flu A.      Acute hypoxic respiratory failure 2/2 viral PNA  Sepsis 2/2 Flu A   - Sinus tachy, tachypnea but no leukocytosis and lactate normal   - Requiring 4L supplemental O2 to maintain O2 sat at 95%  - Viral panel positive for Flu A therefore will start tamiflu   - CTA chest w/ LLL infiltrate but likely from viral PNA but will check sputum culture and strep UAg  - Will order blood cultures and MRSA pcr as well   - CT a/p also reporting ascites therefore will order abd US to assess if enough fluid for paracentesis to r/o SBP but will empirically treat w/ rocephin and consult GI   - Defer procal as unreliable in setting of renal dysfunction   - CT a/p w/ report of possible pyelo but UA w/out pyuria  - Supportive care with cough suppressant and expectorant       Abdominal pain, N/V possible 2/2 above vs ascites   - Mildly distended but soft and nontender, no guarding/rigidity/peritoneal signs   - No diarrhea reported   - Lipase normal and lactate normal   - Liver, bile ducts and GB WNL on CT a/p   - Could be due to influenza however has new moderate ascites on CT a/p   - Will order abdominal US to better assess if enough fluid for diagnostic paracentesis and if so will place IR consult     - Will cover w/ rocephin empirically for SBP and consult GI   - Hold ASA and plavix for now pending abd US and possible paracentesis (last took asa/plavix 1/06/25)  - Has hx of ischemic bowel but lactate normal, benign and CT a/p w/ no significant acute findings   - c/w anti-emetics and analgesics prn   - Serial abd exams and if worsens repeat stat lactate       Type II MI likely demand 2/2 above vs poor renal clearance   - Trop 70 and BNP >33K but no chest pain   - EKG w/ no acute ischemic changes  - Stat repeat trop ordered   - Repeat EKG ordered to assess for dynamic changes   - If trop notably increases or EKG changes will consult cardio and w/u further   - If trop flat or down trending no further w/u for now   - Monitor on telemetry       AMGA likely 2/2 uremia and sepsis  - Anticipate will improve s/p HD and now that sating well on O2  - Serum HCO3 mildly low and Lactate normal   - Repeat CMP tomorrow to reassess       ESRD   - On HD t/th/sa and due for HD today   - Monitor I/Os   - Renally dose medications   - Nephro consulted       Chronic normocytic anemia   Chronic thrombocytopenia  - H/H currently better than baseline (ranges 7-8.5) but suspect hemoconcentrated   - Plts at baseline and on average range from 50's-150's w/ last plt 12/14/24 noted to be 66    - Hemodynamically stable w/ no active bleeding at this time but was admitted for rectal bleeding 11/2024   - c/w PPI for gi cytoprotection and avoid NSAIDs   - Will check iron panel, b12 and folate given RDW  - Monitor CBC and transfuse for Hb<8      Afib  - Currently in sinus tachy on monitor and likely from sepsis   - s/p watchman and no longer on AC  - c/w carvedilol for rate control       CAD s/p CABG  HTN / HLD  - Hold ASA, plavix for possible paracentesis (last took 1/6/25)  - c/w statin therapy   - c/w clonidine, isosorbide, carvedilol, amlodipine, torsemide      Seizure disorder   - c/w keppra       BPH  - c/w flomax       Incidental CT finding   - Readministration of 1.5 cm sized enhancing right renal lesion, concerning for neoplasm  - Refer to heme/onc on discharge         VTE ppx:  heparin sq bid (as pt is on DAPT)     Dispo: Acute.  Low threshold for upgrade if pt decompensates further or MICU consult if needed.

## 2025-01-07 NOTE — ED ADULT NURSE NOTE - ED STAT RN HANDOFF DETAILS
Patient A&Ox4 resting in bed, placed on telemonitoring box in ST rhythm with , on 2l oxygen spo2 95%, patient given Dilaudid for lower abdominal pain, c/o nausea, Zofran given asper orders, patient getting admitted for Influenza A, droplet precautions initiated. Patient being moved to Isolation room @. hand off report given to Zahra MALLORY

## 2025-01-07 NOTE — ED ADULT NURSE NOTE - OBJECTIVE STATEMENT
pt axo3 with equal and unlabored resp, c/o abd pain, nausea, vomiting, denies diarrhea or fevers,. pt tachycardic on tele monitor at 120 bpm, c/o LOWER abdominal pain. PMH of aortic dissection and aneurysm.

## 2025-01-07 NOTE — ED ADULT NURSE REASSESSMENT NOTE - NS ED NURSE REASSESS COMMENT FT1
Patient A&Ox4 moaning in pain, continues to be on cardiac monitor in ST, patient having difficulty breathing due to pain, SPO2 86 on RA, patient placed on NC on 2 l oxygen , morphine given for pain as per orders, urine sample sent, awaiting results.

## 2025-01-07 NOTE — ED PROVIDER NOTE - CLINICAL SUMMARY MEDICAL DECISION MAKING FREE TEXT BOX
Marine PEPE: 64-year-old male history of A-fib, watchman's procedure, ESRD on HD, CAD status post CABG, aortic dissection s/p repair ischemic bowel s/p resection presenting for 2 days of nausea with multiple episodes of NBNB emesis with periumbilical/suprapubic pain.  Normal BMs, no diarrhea.  Denies fever, chest pain, shortness of breath.  Has not missed dialysis.  Exam notable for patient in mild discomfort secondary to pain, tachycardic, diffuse lower abdominal tenderness to palpation.  Plan for labs, symptom control, IV fluids, antiemetics, CTAP and reassess. Marine PEPE: 64-year-old male history of A-fib, watchman's procedure, ESRD on HD, CAD status post CABG, aortic dissection s/p repair ischemic bowel s/p resection presenting for 2 days of nausea with multiple episodes of NBNB emesis with periumbilical/suprapubic pain.  Normal BMs, no diarrhea.  Denies fever, chest pain, shortness of breath.  Has not missed dialysis.  Exam notable for patient in mild discomfort secondary to pain, tachycardic, diffuse lower abdominal tenderness to palpation.  Plan for labs, symptom control, IV fluids, antiemetics, CTAP and reassess.    Basic lab work, troponin and lipase are WNL. Patient's physical exam and lab findings c/w possible gastroenteritis of viral origin. Patient's viral swab shows... CT imaging of the abdomen showing chronic aortic dissection, no change. However, incidental finding of renal lesion was noted, discussed finding with patient and will arrange for follow up with [facility]. Marine PEPE: 64-year-old male history of A-fib, watchman's procedure, ESRD on HD, CAD status post CABG, aortic dissection s/p repair ischemic bowel s/p resection presenting for 2 days of nausea with multiple episodes of NBNB emesis with periumbilical/suprapubic pain.  Normal BMs, no diarrhea.  Denies fever, chest pain, shortness of breath.  Has not missed dialysis.  Exam notable for patient in mild discomfort secondary to pain, tachycardic, diffuse lower abdominal tenderness to palpation.  Plan for labs, symptom control, IV fluids, antiemetics, CTAP and reassess.    Basic lab work, troponin and lipase are WNL. Patient's physical exam and lab findings c/w possible gastroenteritis of viral origin. Patient's viral swab shows dx of influenza/ Patient to be admiited for HD. CT imaging of the abdomen showing chronic aortic dissection, no change. However, incidental finding of renal lesion was noted, discussed finding with patient and will arrange for follow up with [facility].

## 2025-01-08 LAB
A1C WITH ESTIMATED AVERAGE GLUCOSE RESULT: 4.2 % — SIGNIFICANT CHANGE UP (ref 4–5.6)
ALBUMIN SERPL ELPH-MCNC: 4.5 G/DL — SIGNIFICANT CHANGE UP (ref 3.3–5.2)
ALP SERPL-CCNC: 86 U/L — SIGNIFICANT CHANGE UP (ref 40–120)
ALT FLD-CCNC: 11 U/L — SIGNIFICANT CHANGE UP
ANION GAP SERPL CALC-SCNC: 21 MMOL/L — HIGH (ref 5–17)
AST SERPL-CCNC: 22 U/L — SIGNIFICANT CHANGE UP
BASOPHILS # BLD AUTO: 0.02 K/UL — SIGNIFICANT CHANGE UP (ref 0–0.2)
BASOPHILS NFR BLD AUTO: 0.3 % — SIGNIFICANT CHANGE UP (ref 0–2)
BILIRUB SERPL-MCNC: 0.8 MG/DL — SIGNIFICANT CHANGE UP (ref 0.4–2)
BUN SERPL-MCNC: 84.6 MG/DL — HIGH (ref 8–20)
CALCIUM SERPL-MCNC: 9 MG/DL — SIGNIFICANT CHANGE UP (ref 8.4–10.5)
CHLORIDE SERPL-SCNC: 100 MMOL/L — SIGNIFICANT CHANGE UP (ref 96–108)
CHOLEST SERPL-MCNC: 105 MG/DL — SIGNIFICANT CHANGE UP
CO2 SERPL-SCNC: 20 MMOL/L — LOW (ref 22–29)
CREAT SERPL-MCNC: 8.64 MG/DL — HIGH (ref 0.5–1.3)
EGFR: 6 ML/MIN/1.73M2 — LOW
EOSINOPHIL # BLD AUTO: 0 K/UL — SIGNIFICANT CHANGE UP (ref 0–0.5)
EOSINOPHIL NFR BLD AUTO: 0 % — SIGNIFICANT CHANGE UP (ref 0–6)
ESTIMATED AVERAGE GLUCOSE: 74 MG/DL — SIGNIFICANT CHANGE UP (ref 68–114)
FERRITIN SERPL-MCNC: 1727 NG/ML — HIGH (ref 30–400)
FOLATE SERPL-MCNC: 11.1 NG/ML — SIGNIFICANT CHANGE UP
GLUCOSE SERPL-MCNC: 99 MG/DL — SIGNIFICANT CHANGE UP (ref 70–99)
HCT VFR BLD CALC: 34.3 % — LOW (ref 39–50)
HDLC SERPL-MCNC: 58 MG/DL — SIGNIFICANT CHANGE UP
HGB BLD-MCNC: 10.5 G/DL — LOW (ref 13–17)
IMM GRANULOCYTES NFR BLD AUTO: 0.3 % — SIGNIFICANT CHANGE UP (ref 0–0.9)
IRON SATN MFR SERPL: 21 UG/DL — LOW (ref 59–158)
IRON SATN MFR SERPL: 8 % — LOW (ref 16–55)
LACTATE SERPL-SCNC: 1.3 MMOL/L — SIGNIFICANT CHANGE UP (ref 0.5–2)
LIPID PNL WITH DIRECT LDL SERPL: 31 MG/DL — SIGNIFICANT CHANGE UP
LYMPHOCYTES # BLD AUTO: 0.27 K/UL — LOW (ref 1–3.3)
LYMPHOCYTES # BLD AUTO: 4.6 % — LOW (ref 13–44)
MAGNESIUM SERPL-MCNC: 2 MG/DL — SIGNIFICANT CHANGE UP (ref 1.6–2.6)
MCHC RBC-ENTMCNC: 30.6 G/DL — LOW (ref 32–36)
MCHC RBC-ENTMCNC: 31.2 PG — SIGNIFICANT CHANGE UP (ref 27–34)
MCV RBC AUTO: 101.8 FL — HIGH (ref 80–100)
MONOCYTES # BLD AUTO: 0.29 K/UL — SIGNIFICANT CHANGE UP (ref 0–0.9)
MONOCYTES NFR BLD AUTO: 5 % — SIGNIFICANT CHANGE UP (ref 2–14)
NEUTROPHILS # BLD AUTO: 5.25 K/UL — SIGNIFICANT CHANGE UP (ref 1.8–7.4)
NEUTROPHILS NFR BLD AUTO: 89.8 % — HIGH (ref 43–77)
NON HDL CHOLESTEROL: 47 MG/DL — SIGNIFICANT CHANGE UP
PHOSPHATE SERPL-MCNC: 9.5 MG/DL — HIGH (ref 2.4–4.7)
PLATELET # BLD AUTO: 72 K/UL — LOW (ref 150–400)
POTASSIUM SERPL-MCNC: 5 MMOL/L — SIGNIFICANT CHANGE UP (ref 3.5–5.3)
POTASSIUM SERPL-SCNC: 5 MMOL/L — SIGNIFICANT CHANGE UP (ref 3.5–5.3)
PROT SERPL-MCNC: 8.1 G/DL — SIGNIFICANT CHANGE UP (ref 6.6–8.7)
RBC # BLD: 3.37 M/UL — LOW (ref 4.2–5.8)
RBC # FLD: 17.2 % — HIGH (ref 10.3–14.5)
SODIUM SERPL-SCNC: 141 MMOL/L — SIGNIFICANT CHANGE UP (ref 135–145)
TIBC SERPL-MCNC: 247 UG/DL — SIGNIFICANT CHANGE UP (ref 220–430)
TRANSFERRIN SERPL-MCNC: 173 MG/DL — LOW (ref 180–329)
TRIGL SERPL-MCNC: 78 MG/DL — SIGNIFICANT CHANGE UP
VIT B12 SERPL-MCNC: 809 PG/ML — SIGNIFICANT CHANGE UP (ref 232–1245)
WBC # BLD: 5.85 K/UL — SIGNIFICANT CHANGE UP (ref 3.8–10.5)
WBC # FLD AUTO: 5.85 K/UL — SIGNIFICANT CHANGE UP (ref 3.8–10.5)

## 2025-01-08 PROCEDURE — 76705 ECHO EXAM OF ABDOMEN: CPT | Mod: 26

## 2025-01-08 PROCEDURE — 99233 SBSQ HOSP IP/OBS HIGH 50: CPT

## 2025-01-08 PROCEDURE — 93010 ELECTROCARDIOGRAM REPORT: CPT

## 2025-01-08 PROCEDURE — 93970 EXTREMITY STUDY: CPT | Mod: 26

## 2025-01-08 PROCEDURE — 76942 ECHO GUIDE FOR BIOPSY: CPT | Mod: 26

## 2025-01-08 RX ORDER — LABETALOL HCL 300 MG/1
10 TABLET, FILM COATED ORAL EVERY 6 HOURS
Refills: 0 | Status: DISCONTINUED | OUTPATIENT
Start: 2025-01-08 | End: 2025-01-10

## 2025-01-08 RX ORDER — LEVETIRACETAM 100 MG/ML
500 SOLUTION ORAL EVERY 12 HOURS
Refills: 0 | Status: DISCONTINUED | OUTPATIENT
Start: 2025-01-08 | End: 2025-01-14

## 2025-01-08 RX ORDER — LABETALOL HCL 300 MG/1
10 TABLET, FILM COATED ORAL ONCE
Refills: 0 | Status: COMPLETED | OUTPATIENT
Start: 2025-01-08 | End: 2025-01-08

## 2025-01-08 RX ORDER — SODIUM CHLORIDE 9 MG/ML
1000 INJECTION, SOLUTION INTRAMUSCULAR; INTRAVENOUS; SUBCUTANEOUS
Refills: 0 | Status: COMPLETED | OUTPATIENT
Start: 2025-01-08 | End: 2025-01-09

## 2025-01-08 RX ORDER — SEVELAMER CARBONATE 800 MG/1
1600 TABLET, FILM COATED ORAL
Refills: 0 | Status: DISCONTINUED | OUTPATIENT
Start: 2025-01-08 | End: 2025-01-14

## 2025-01-08 RX ORDER — LORAZEPAM 1 MG/1
0.2 TABLET ORAL EVERY 12 HOURS
Refills: 0 | Status: DISCONTINUED | OUTPATIENT
Start: 2025-01-08 | End: 2025-01-14

## 2025-01-08 RX ORDER — SODIUM CHLORIDE 9 MG/ML
1000 INJECTION, SOLUTION INTRAMUSCULAR; INTRAVENOUS; SUBCUTANEOUS
Refills: 0 | Status: DISCONTINUED | OUTPATIENT
Start: 2025-01-08 | End: 2025-01-08

## 2025-01-08 RX ORDER — SODIUM ZIRCONIUM CYCLOSILICATE 10 G/10G
10 POWDER, FOR SUSPENSION ORAL ONCE
Refills: 0 | Status: DISCONTINUED | OUTPATIENT
Start: 2025-01-08 | End: 2025-01-08

## 2025-01-08 RX ADMIN — Medication 1 MILLIGRAM(S): at 11:00

## 2025-01-08 RX ADMIN — Medication 1 MILLIGRAM(S): at 10:30

## 2025-01-08 RX ADMIN — ONDANSETRON 4 MILLIGRAM(S): 4 TABLET ORAL at 10:10

## 2025-01-08 RX ADMIN — LABETALOL HCL 10 MILLIGRAM(S): 300 TABLET, FILM COATED ORAL at 06:27

## 2025-01-08 RX ADMIN — CEFTRIAXONE SODIUM 2000 MILLIGRAM(S): 1 INJECTION, POWDER, FOR SOLUTION INTRAMUSCULAR; INTRAVENOUS at 14:37

## 2025-01-08 RX ADMIN — HEPARIN SODIUM 5000 UNIT(S): 1000 INJECTION, SOLUTION INTRAVENOUS; SUBCUTANEOUS at 18:03

## 2025-01-08 RX ADMIN — Medication 1 MILLIGRAM(S): at 06:02

## 2025-01-08 RX ADMIN — Medication 2 MILLIGRAM(S): at 01:40

## 2025-01-08 RX ADMIN — Medication 2 MILLIGRAM(S): at 18:01

## 2025-01-08 RX ADMIN — SODIUM CHLORIDE 40 MILLILITER(S): 9 INJECTION, SOLUTION INTRAMUSCULAR; INTRAVENOUS; SUBCUTANEOUS at 14:37

## 2025-01-08 RX ADMIN — Medication 1 MILLIGRAM(S): at 05:02

## 2025-01-08 RX ADMIN — ONDANSETRON 4 MILLIGRAM(S): 4 TABLET ORAL at 17:57

## 2025-01-08 RX ADMIN — LEVETIRACETAM 500 MILLIGRAM(S): 100 SOLUTION ORAL at 18:03

## 2025-01-08 RX ADMIN — Medication 2 MILLIGRAM(S): at 01:06

## 2025-01-08 RX ADMIN — Medication 2 MILLIGRAM(S): at 14:57

## 2025-01-08 RX ADMIN — Medication 2 MILLIGRAM(S): at 15:25

## 2025-01-08 RX ADMIN — HEPARIN SODIUM 5000 UNIT(S): 1000 INJECTION, SOLUTION INTRAVENOUS; SUBCUTANEOUS at 05:56

## 2025-01-08 RX ADMIN — SODIUM CHLORIDE 75 MILLILITER(S): 9 INJECTION, SOLUTION INTRAMUSCULAR; INTRAVENOUS; SUBCUTANEOUS at 12:12

## 2025-01-08 NOTE — PROGRESS NOTE ADULT - ASSESSMENT
65 y/o M w/ PMH of Afib (s/p watchman and no longer on AC), ESRD (on HD T/Th/Sa; LUE AFV), chronic anemia, chronic thrombocytopenia, rectal bleeding, CAD s/p CABG, aortic & mitral valve replacement, Type A aortic dissection s/p repair complicated by ischemic bowel s/p resection, seizure disorder on Keppra, IE of MV with MSSA and s/p course of Ancef with f/u MUSTAPHA negative for vegetation in 23', presents to the ED for 2 days of sob, nasal congestion, nonproductive cough w/ associated N/V and abd pain.  Pain is periumbilical and emesis is NBNB.  Vitals notable for sinus tachy in 120's, tachypnea and O2 sat 80% ORA, now on 4L NC and sat'ing 95%.  Clinically toxic appearing w/ clear nasal discharge, poor inspiratory effort, scattered rales, and benign abd exam.  Initial w/u significant for trop 70, BNP>33K, AGMA 20, HCO3 20, BUN 72, Cr 7.95, lipase 7 and lactate 1.0, no acute ischemic changes on EKG. UA w/out pyuria.  CTA c/a/p reporting new moderate ascites, possible right pyelo and LLL infiltrate.  Received 1L IVNS, bentyl 10mgx1, reglan 10mg x1, morphine 4mg x3, zofran 4mg x2, sucralfate 1g x1 in ED.  Will admit for acute hypoxic respiratory failure and sepsis 2/2 Flu A.    Acute hypoxic respiratory failure 2/2 viral PNA  Sepsis 2/2 Flu A   - Sinus tachy, tachypnea   - Requiring 4L supplemental O2 to maintain O2 sat at 95%  - Viral panel positive for Flu A therefore will start tamiflu   - CTA chest w/ LLL infiltrate but likely from viral PNA but will check sputum culture and strep UAg  - f/u BCx  - CTX as below  - CT chest and a/p result noted  - Supportive care with cough suppressant and expectorant     Abdominal pain, N/V possible 2/2 above vs ascites   - CT a/p result noted  - f/u repeat lactate  - start mIVF given inability to tolerate PO  - Liver, bile ducts and GB WNL on CT a/p   - ruq us pending to eval Ascites  - IR for diagnostic tap after ultrasound  - on empiric CTX  - GI following, randy recs   - c/w anti-emetics and analgesics prn     Type II MI likely demand 2/2 above vs poor renal clearance   - Trop 70 and BNP >33K but no chest pain   - EKG w/ no acute ischemic changes  - Stat repeat trop ordered     AMGA likely 2/2 uremia and sepsis  - Anticipate will improve s/p HD and now that sating well on O2  - Serum HCO3 mildly low and Lactate normal   - Repeat CMP tomorrow to reassess     ESRD   - On HD t/th/sa and due for HD today   - Monitor I/Os   - Renally dose medications   - Nephro consulted     Chronic normocytic anemia   Chronic thrombocytopenia  - H/H currently better than baseline (ranges 7-8.5), serial CBC    Afib  - Currently in sinus tachy on monitor and likely from sepsis   - s/p watchman and no longer on AC  - c/w carvedilol for rate control     CAD s/p CABG  HTN / HLD  - Hold ASA, plavix for possible paracentesis (last took 1/6/25)  - c/w statin therapy   - c/w clonidine, isosorbide, carvedilol, amlodipine, torsemide    Seizure disorder   - c/w keppra IV    BPH  - c/w flomax     Incidental CT finding   - Readministration of 1.5 cm sized enhancing right renal lesion, concerning for neoplasm  - Refer to heme/onc on discharge     Aortic dissection  - h/o known aortic dissection, appears stable on CT  - outpt f/u    VTE ppx:  heparin sq bid (as pt is on DAPT)   Dispo: Acute pending above workup    65 y/o M w/ PMH of Afib (s/p watchman and no longer on AC), ESRD (on HD T/Th/Sa; LUE AFV), chronic anemia, chronic thrombocytopenia, rectal bleeding, CAD s/p CABG, aortic & mitral valve replacement, Type A aortic dissection s/p repair complicated by ischemic bowel s/p resection, seizure disorder on Keppra, IE of MV with MSSA and s/p course of Ancef with f/u MUSTAPHA negative for vegetation in 23', presents to the ED for 2 days of sob, nasal congestion, nonproductive cough w/ associated N/V and abd pain.  Pain is periumbilical and emesis is NBNB.  Vitals notable for sinus tachy in 120's, tachypnea and O2 sat 80% ORA, now on 4L NC and sat'ing 95%.  Clinically toxic appearing w/ clear nasal discharge, poor inspiratory effort, scattered rales, and benign abd exam.  Initial w/u significant for trop 70, BNP>33K, AGMA 20, HCO3 20, BUN 72, Cr 7.95, lipase 7 and lactate 1.0, no acute ischemic changes on EKG. UA w/out pyuria.  CTA c/a/p reporting new moderate ascites, possible right pyelo and LLL infiltrate.  Received 1L IVNS, bentyl 10mgx1, reglan 10mg x1, morphine 4mg x3, zofran 4mg x2, sucralfate 1g x1 in ED.  Will admit for acute hypoxic respiratory failure and sepsis 2/2 Flu A.    Acute hypoxic respiratory failure 2/2 viral PNA  Sepsis 2/2 Flu A   - Sinus tachy, tachypnea   - Requiring 4L supplemental O2 to maintain O2 sat at 95%  - Viral panel positive for Flu A therefore will start tamiflu   - CTA chest w/ LLL infiltrate but likely from viral PNA but will check sputum culture and strep UAg  - f/u BCx  - CTX as below  - CT chest and a/p result noted  - Supportive care with cough suppressant and expectorant     Abdominal pain, N/V possible 2/2 above vs ascites   - CT a/p result noted  - f/u repeat lactate  - start mIVF given inability to tolerate PO  - Liver, bile ducts and GB WNL on CT a/p   - ruq us pending to eval Ascites  - will need to consult IR for diagnostic tap after ultrasound  - on empiric CTX  - GI following, randy recs   - c/w anti-emetics and analgesics prn     Type II MI likely demand 2/2 above vs poor renal clearance   - Trop 70 and BNP >33K but no chest pain   - EKG w/ no acute ischemic changes  - Stat repeat trop ordered     AMGA likely 2/2 uremia and sepsis  - Anticipate will improve s/p HD and now that sating well on O2  - Serum HCO3 mildly low and Lactate normal   - Repeat CMP tomorrow to reassess     ESRD   - On HD t/th/sa and due for HD today   - Monitor I/Os   - Renally dose medications   - Nephro consulted     Chronic normocytic anemia   Chronic thrombocytopenia  - H/H currently better than baseline (ranges 7-8.5), serial CBC    Afib  - Currently in sinus tachy on monitor and likely from sepsis   - s/p watchman and no longer on AC  - c/w carvedilol for rate control     CAD s/p CABG  HTN / HLD  - Hold ASA, plavix for possible paracentesis (last took 1/6/25)  - c/w statin therapy   - c/w clonidine, isosorbide, carvedilol, amlodipine, torsemide    Seizure disorder   - c/w keppra IV    BPH  - c/w flomax     Incidental CT finding   - Readministration of 1.5 cm sized enhancing right renal lesion, concerning for neoplasm  - Refer to heme/onc on discharge     Aortic dissection  - h/o known aortic dissection, appears stable on CT  - outpt f/u    VTE ppx:  heparin sq bid (as pt is on DAPT)   Dispo: Acute improvement in resp status, abd pain, n/v and RUQ u/s and eventual IR for diagnostic paracentesis.  65 y/o M w/ PMH of Afib (s/p watchman and no longer on AC), ESRD (on HD T/Th/Sa; LUE AFV), chronic anemia, chronic thrombocytopenia, rectal bleeding, CAD s/p CABG, aortic & mitral valve replacement, Type A aortic dissection s/p repair complicated by ischemic bowel s/p resection, seizure disorder on Keppra, IE of MV with MSSA and s/p course of Ancef with f/u MUSTAPHA negative for vegetation in 23', presents to the ED for 2 days of sob, nasal congestion, nonproductive cough w/ associated N/V and abd pain.  Pain is periumbilical and emesis is NBNB.  Vitals notable for sinus tachy in 120's, tachypnea and O2 sat 80% ORA, now on 4L NC and sat'ing 95%.  Clinically toxic appearing w/ clear nasal discharge, poor inspiratory effort, scattered rales, and benign abd exam.  Initial w/u significant for trop 70, BNP>33K, AGMA 20, HCO3 20, BUN 72, Cr 7.95, lipase 7 and lactate 1.0, no acute ischemic changes on EKG. UA w/out pyuria.  CTA c/a/p reporting new moderate ascites, possible right pyelo and LLL infiltrate.  Received 1L IVNS, bentyl 10mgx1, reglan 10mg x1, morphine 4mg x3, zofran 4mg x2, sucralfate 1g x1 in ED.  Will admit for acute hypoxic respiratory failure and sepsis 2/2 Flu A.    Acute hypoxic respiratory failure 2/2 viral PNA  Sepsis 2/2 Flu A   - Sinus tachy, tachypnea   - Requiring 4L supplemental O2 to maintain O2 sat at 95%  - Viral panel positive for Flu A therefore will start tamiflu   - CTA chest w/ LLL infiltrate but likely from viral PNA but will check sputum culture and strep UAg  - f/u BCx  - CTX as below  - CT chest and a/p result noted  - Supportive care with cough suppressant and expectorant   - with persistent tachycardia, ?PE, will check dvt studies    Abdominal pain, N/V possible 2/2 above vs ascites   - CT a/p result noted  - f/u repeat lactate  - start mIVF given inability to tolerate PO  - Liver, bile ducts and GB WNL on CT a/p   - ruq us pending to eval Ascites  - will need to consult IR for diagnostic tap after ultrasound  - on empiric CTX  - GI following, randy recs   - c/w anti-emetics and analgesics prn     Type II MI likely demand 2/2 above vs poor renal clearance   - Trop 70 and BNP >33K but no chest pain   - EKG w/ no acute ischemic changes  - Stat repeat trop ordered     AMGA likely 2/2 uremia and sepsis  - Anticipate will improve s/p HD and now that sating well on O2  - Serum HCO3 mildly low and Lactate normal   - Repeat CMP tomorrow to reassess     ESRD   - On HD t/th/sa and due for HD today   - Monitor I/Os   - Renally dose medications   - Nephro consulted     Chronic normocytic anemia   Chronic thrombocytopenia  - H/H currently better than baseline (ranges 7-8.5), serial CBC    Afib  - Currently in sinus tachy on monitor and likely from sepsis   - s/p watchman and no longer on AC  - c/w carvedilol for rate control     CAD s/p CABG  HTN / HLD  - Hold ASA, plavix for possible paracentesis (last took 1/6/25)  - c/w statin therapy   - c/w clonidine, isosorbide, carvedilol, amlodipine, torsemide    Seizure disorder   - c/w keppra IV    BPH  - c/w flomax     Incidental CT finding   - Readministration of 1.5 cm sized enhancing right renal lesion, concerning for neoplasm  - Refer to heme/onc on discharge     Aortic dissection  - h/o known aortic dissection, appears stable on CT  - outpt f/u    VTE ppx:  heparin sq bid (as pt is on DAPT)   Dispo: Acute improvement in resp status, abd pain, n/v and RUQ u/s and eventual IR for diagnostic paracentesis.

## 2025-01-08 NOTE — POST DISCHARGE NOTE - NOTIFICATION:
Notified Dr. Rolando Walton of patient's CT findings.  Please contact [email or web address] or [phone] or select “Cancer Care Direct” on the discharge disposition sheet when the patient is close to discharge for assistance in outpatient care.

## 2025-01-08 NOTE — PROGRESS NOTE ADULT - SUBJECTIVE AND OBJECTIVE BOX
Rolando Walton M.D.    Patient is a 64y old  Male who presents with a chief complaint of acute hypoxic respiratory failure 2/2 flu A (07 Jan 2025 15:16)      SUBJECTIVE / OVERNIGHT EVENTS: feels crap with persistent n/v and abd pain. Also severe cough.     MEDICATIONS  (STANDING):  amLODIPine   Tablet 5 milliGRAM(s) Oral daily  atorvastatin 40 milliGRAM(s) Oral at bedtime  carvedilol 12.5 milliGRAM(s) Oral every 12 hours  cefTRIAXone Injectable.      cefTRIAXone Injectable. 2000 milliGRAM(s) IV Push every 24 hours  cloNIDine 0.1 milliGRAM(s) Oral two times a day  guaifenesin/dextromethorphan Oral Liquid 10 milliLiter(s) Oral every 4 hours  heparin   Injectable 5000 Unit(s) SubCutaneous every 12 hours  hydrALAZINE 100 milliGRAM(s) Oral two times a day  isosorbide   mononitrate ER Tablet (IMDUR) 60 milliGRAM(s) Oral daily  levETIRAcetam   Injectable 500 milliGRAM(s) IV Push every 12 hours  oseltamivir 30 milliGRAM(s) Oral <User Schedule>  pantoprazole    Tablet 40 milliGRAM(s) Oral before breakfast  senna 2 Tablet(s) Oral at bedtime  sodium chloride 0.9%. 1000 milliLiter(s) (75 mL/Hr) IV Continuous <Continuous>  tamsulosin 0.4 milliGRAM(s) Oral at bedtime  torsemide 10 milliGRAM(s) Oral daily    MEDICATIONS  (PRN):  acetaminophen     Tablet .. 650 milliGRAM(s) Oral every 6 hours PRN Temp greater or equal to 38C (100.4F), Mild Pain (1 - 3)  aluminum hydroxide/magnesium hydroxide/simethicone Suspension 30 milliLiter(s) Oral every 4 hours PRN Dyspepsia  benzonatate 100 milliGRAM(s) Oral every 8 hours PRN Cough  HYDROmorphone  Injectable 1 milliGRAM(s) IV Push every 3 hours PRN Moderate Pain (4 - 6)  HYDROmorphone  Injectable 2 milliGRAM(s) IV Push every 3 hours PRN Severe Pain (7 - 10)  labetalol Injectable 10 milliGRAM(s) IV Push every 6 hours PRN Systolic blood pressure > 160  melatonin 3 milliGRAM(s) Oral at bedtime PRN Insomnia  metoclopramide Injectable 10 milliGRAM(s) IV Push every 6 hours PRN nausea vomiting  ondansetron Injectable 4 milliGRAM(s) IV Push every 6 hours PRN Nausea and/or Vomiting      I&O's Summary      PHYSICAL EXAM:  Vital Signs Last 24 Hrs  T(C): 36.3 (08 Jan 2025 08:30), Max: 36.9 (07 Jan 2025 13:30)  T(F): 97.3 (08 Jan 2025 08:30), Max: 98.4 (07 Jan 2025 13:30)  HR: 123 (08 Jan 2025 10:33) (95 - 127)  BP: 146/83 (08 Jan 2025 10:33) (135/110 - 191/96)  BP(mean): --  RR: 20 (08 Jan 2025 10:33) (18 - 20)  SpO2: 99% (08 Jan 2025 10:33) (92% - 99%)    Parameters below as of 08 Jan 2025 10:33  Patient On (Oxygen Delivery Method): nasal cannula  O2 Flow (L/min): 4      CONSTITUTIONAL: distressed on NC  RESPIRATORY: Tachypnea, course bs b/l  CARDIOVASCULAR: tachycardiac, no LE edema  ABDOMEN: diffusely TTP, worst in the epigastric region     LABS:                        10.5   5.85  )-----------( 72       ( 08 Jan 2025 07:17 )             34.3     01-08    141  |  100  |  84.6[H]  ----------------------------<  99  5.0   |  20.0[L]  |  8.64[H]    Ca    9.0      08 Jan 2025 07:17  Phos  9.5     01-08  Mg     2.0     01-08    TPro  8.1  /  Alb  4.5  /  TBili  0.8  /  DBili  x   /  AST  22  /  ALT  11  /  AlkPhos  86  01-08    PT/INR - ( 07 Jan 2025 03:00 )   PT: 16.4 sec;   INR: 1.42 ratio         PTT - ( 07 Jan 2025 03:00 )  PTT:35.3 sec      Urinalysis Basic - ( 08 Jan 2025 07:17 )    Color: x / Appearance: x / SG: x / pH: x  Gluc: 99 mg/dL / Ketone: x  / Bili: x / Urobili: x   Blood: x / Protein: x / Nitrite: x   Leuk Esterase: x / RBC: x / WBC x   Sq Epi: x / Non Sq Epi: x / Bacteria: x        CAPILLARY BLOOD GLUCOSE          RADIOLOGY & ADDITIONAL TESTS:  Results Reviewed:   Imaging Personally Reviewed:  Electrocardiogram Personally Reviewed:

## 2025-01-09 LAB
ALBUMIN SERPL ELPH-MCNC: 4.1 G/DL — SIGNIFICANT CHANGE UP (ref 3.3–5.2)
ALP SERPL-CCNC: 70 U/L — SIGNIFICANT CHANGE UP (ref 40–120)
ALT FLD-CCNC: 11 U/L — SIGNIFICANT CHANGE UP
ANION GAP SERPL CALC-SCNC: 23 MMOL/L — HIGH (ref 5–17)
AST SERPL-CCNC: 20 U/L — SIGNIFICANT CHANGE UP
BASE EXCESS BLDA CALC-SCNC: -7 MMOL/L — LOW (ref -2–3)
BILIRUB SERPL-MCNC: 0.7 MG/DL — SIGNIFICANT CHANGE UP (ref 0.4–2)
BLOOD GAS COMMENTS ARTERIAL: SIGNIFICANT CHANGE UP
BUN SERPL-MCNC: 100.8 MG/DL — HIGH (ref 8–20)
CALCIUM SERPL-MCNC: 8.3 MG/DL — LOW (ref 8.4–10.5)
CHLORIDE SERPL-SCNC: 105 MMOL/L — SIGNIFICANT CHANGE UP (ref 96–108)
CO2 SERPL-SCNC: 18 MMOL/L — LOW (ref 22–29)
CREAT SERPL-MCNC: 9.89 MG/DL — HIGH (ref 0.5–1.3)
EGFR: 5 ML/MIN/1.73M2 — LOW
GAS PNL BLDA: SIGNIFICANT CHANGE UP
GLUCOSE SERPL-MCNC: 86 MG/DL — SIGNIFICANT CHANGE UP (ref 70–99)
HCO3 BLDA-SCNC: 20 MMOL/L — LOW (ref 21–28)
HCT VFR BLD CALC: 30.9 % — LOW (ref 39–50)
HGB BLD-MCNC: 9.5 G/DL — LOW (ref 13–17)
HOROWITZ INDEX BLDA+IHG-RTO: SIGNIFICANT CHANGE UP
MAGNESIUM SERPL-MCNC: 2.1 MG/DL — SIGNIFICANT CHANGE UP (ref 1.6–2.6)
MCHC RBC-ENTMCNC: 30.7 G/DL — LOW (ref 32–36)
MCHC RBC-ENTMCNC: 30.8 PG — SIGNIFICANT CHANGE UP (ref 27–34)
MCV RBC AUTO: 100.3 FL — HIGH (ref 80–100)
PCO2 BLDA: 46 MMHG — SIGNIFICANT CHANGE UP (ref 35–48)
PH BLDA: 7.25 — LOW (ref 7.35–7.45)
PHOSPHATE SERPL-MCNC: 10.2 MG/DL — HIGH (ref 2.4–4.7)
PLATELET # BLD AUTO: 60 K/UL — LOW (ref 150–400)
PO2 BLDA: 93 MMHG — SIGNIFICANT CHANGE UP (ref 83–108)
POTASSIUM SERPL-MCNC: 5.9 MMOL/L — HIGH (ref 3.5–5.3)
POTASSIUM SERPL-SCNC: 5.9 MMOL/L — HIGH (ref 3.5–5.3)
PROT SERPL-MCNC: 7.6 G/DL — SIGNIFICANT CHANGE UP (ref 6.6–8.7)
RBC # BLD: 3.08 M/UL — LOW (ref 4.2–5.8)
RBC # FLD: 17.2 % — HIGH (ref 10.3–14.5)
SAO2 % BLDA: 99 % — HIGH (ref 94–98)
SODIUM SERPL-SCNC: 145 MMOL/L — SIGNIFICANT CHANGE UP (ref 135–145)
WBC # BLD: 7.53 K/UL — SIGNIFICANT CHANGE UP (ref 3.8–10.5)
WBC # FLD AUTO: 7.53 K/UL — SIGNIFICANT CHANGE UP (ref 3.8–10.5)

## 2025-01-09 PROCEDURE — 36000 PLACE NEEDLE IN VEIN: CPT

## 2025-01-09 PROCEDURE — 99233 SBSQ HOSP IP/OBS HIGH 50: CPT

## 2025-01-09 PROCEDURE — 93010 ELECTROCARDIOGRAM REPORT: CPT

## 2025-01-09 RX ORDER — SODIUM CHLORIDE 9 MG/ML
1000 INJECTION, SOLUTION INTRAVENOUS
Refills: 0 | Status: DISCONTINUED | OUTPATIENT
Start: 2025-01-09 | End: 2025-01-10

## 2025-01-09 RX ORDER — LEVALBUTEROL 1.25 MG/3ML
0.63 SOLUTION RESPIRATORY (INHALATION) ONCE
Refills: 0 | Status: COMPLETED | OUTPATIENT
Start: 2025-01-09 | End: 2025-01-09

## 2025-01-09 RX ORDER — LEVALBUTEROL 1.25 MG/3ML
0.63 SOLUTION RESPIRATORY (INHALATION) EVERY 6 HOURS
Refills: 0 | Status: DISCONTINUED | OUTPATIENT
Start: 2025-01-09 | End: 2025-01-14

## 2025-01-09 RX ORDER — HYDRALAZINE HYDROCHLORIDE 10 MG/1
50 TABLET ORAL
Refills: 0 | Status: DISCONTINUED | OUTPATIENT
Start: 2025-01-09 | End: 2025-01-14

## 2025-01-09 RX ORDER — ACETYLCYSTEINE 200 MG/ML
4 VIAL (ML) MISCELLANEOUS ONCE
Refills: 0 | Status: COMPLETED | OUTPATIENT
Start: 2025-01-09 | End: 2025-01-09

## 2025-01-09 RX ORDER — ASPIRIN 81 MG
81 TABLET, DELAYED RELEASE (ENTERIC COATED) ORAL DAILY
Refills: 0 | Status: DISCONTINUED | OUTPATIENT
Start: 2025-01-09 | End: 2025-01-14

## 2025-01-09 RX ORDER — AZITHROMYCIN MONOHYDRATE 200 MG/5ML
POWDER, FOR SUSPENSION ORAL
Refills: 0 | Status: COMPLETED | OUTPATIENT
Start: 2025-01-09 | End: 2025-01-13

## 2025-01-09 RX ORDER — AZITHROMYCIN MONOHYDRATE 200 MG/5ML
500 POWDER, FOR SUSPENSION ORAL EVERY 24 HOURS
Refills: 0 | Status: COMPLETED | OUTPATIENT
Start: 2025-01-10 | End: 2025-01-13

## 2025-01-09 RX ORDER — EPOETIN ALFA 2000 [IU]/ML
10000 SOLUTION INTRAVENOUS; SUBCUTANEOUS
Refills: 0 | Status: DISCONTINUED | OUTPATIENT
Start: 2025-01-09 | End: 2025-01-12

## 2025-01-09 RX ORDER — METHYLPREDNISOLONE 4 MG/1
40 TABLET ORAL
Refills: 0 | Status: DISCONTINUED | OUTPATIENT
Start: 2025-01-09 | End: 2025-01-14

## 2025-01-09 RX ORDER — AZITHROMYCIN MONOHYDRATE 200 MG/5ML
500 POWDER, FOR SUSPENSION ORAL ONCE
Refills: 0 | Status: COMPLETED | OUTPATIENT
Start: 2025-01-09 | End: 2025-01-09

## 2025-01-09 RX ORDER — SODIUM ZIRCONIUM CYCLOSILICATE 10 G/10G
10 POWDER, FOR SUSPENSION ORAL ONCE
Refills: 0 | Status: DISCONTINUED | OUTPATIENT
Start: 2025-01-09 | End: 2025-01-14

## 2025-01-09 RX ORDER — EPOETIN ALFA 2000 [IU]/ML
10000 SOLUTION INTRAVENOUS; SUBCUTANEOUS
Refills: 0 | Status: DISCONTINUED | OUTPATIENT
Start: 2025-01-09 | End: 2025-01-09

## 2025-01-09 RX ADMIN — HEPARIN SODIUM 5000 UNIT(S): 1000 INJECTION, SOLUTION INTRAVENOUS; SUBCUTANEOUS at 05:50

## 2025-01-09 RX ADMIN — HEPARIN SODIUM 5000 UNIT(S): 1000 INJECTION, SOLUTION INTRAVENOUS; SUBCUTANEOUS at 18:55

## 2025-01-09 RX ADMIN — LEVALBUTEROL 0.63 MILLIGRAM(S): 1.25 SOLUTION RESPIRATORY (INHALATION) at 04:50

## 2025-01-09 RX ADMIN — METHYLPREDNISOLONE 40 MILLIGRAM(S): 4 TABLET ORAL at 18:55

## 2025-01-09 RX ADMIN — CEFTRIAXONE SODIUM 2000 MILLIGRAM(S): 1 INJECTION, POWDER, FOR SOLUTION INTRAMUSCULAR; INTRAVENOUS at 15:47

## 2025-01-09 RX ADMIN — LEVETIRACETAM 500 MILLIGRAM(S): 100 SOLUTION ORAL at 18:56

## 2025-01-09 RX ADMIN — LEVALBUTEROL 0.63 MILLIGRAM(S): 1.25 SOLUTION RESPIRATORY (INHALATION) at 15:51

## 2025-01-09 RX ADMIN — LABETALOL HCL 10 MILLIGRAM(S): 300 TABLET, FILM COATED ORAL at 10:52

## 2025-01-09 RX ADMIN — Medication 2 MILLIGRAM(S): at 04:06

## 2025-01-09 RX ADMIN — ONDANSETRON 4 MILLIGRAM(S): 4 TABLET ORAL at 11:13

## 2025-01-09 RX ADMIN — Medication 2 MILLIGRAM(S): at 16:12

## 2025-01-09 RX ADMIN — SODIUM CHLORIDE 75 MILLILITER(S): 9 INJECTION, SOLUTION INTRAMUSCULAR; INTRAVENOUS; SUBCUTANEOUS at 16:42

## 2025-01-09 RX ADMIN — METOCLOPRAMIDE 10 MILLIGRAM(S): 10 TABLET ORAL at 15:47

## 2025-01-09 RX ADMIN — LEVETIRACETAM 500 MILLIGRAM(S): 100 SOLUTION ORAL at 05:50

## 2025-01-09 RX ADMIN — AZITHROMYCIN MONOHYDRATE 255 MILLIGRAM(S): 200 POWDER, FOR SUSPENSION ORAL at 10:58

## 2025-01-09 RX ADMIN — METHYLPREDNISOLONE 40 MILLIGRAM(S): 4 TABLET ORAL at 11:03

## 2025-01-09 RX ADMIN — Medication 4 MILLILITER(S): at 04:50

## 2025-01-09 RX ADMIN — LEVALBUTEROL 0.63 MILLIGRAM(S): 1.25 SOLUTION RESPIRATORY (INHALATION) at 20:37

## 2025-01-09 RX ADMIN — Medication 2 MILLIGRAM(S): at 05:00

## 2025-01-09 NOTE — PROGRESS NOTE ADULT - SUBJECTIVE AND OBJECTIVE BOX
NEPHROLOGY INTERVAL HPI/OVERNIGHT EVENTS:    No new events.    MEDICATIONS  (STANDING):  amLODIPine   Tablet 5 milliGRAM(s) Oral daily  atorvastatin 40 milliGRAM(s) Oral at bedtime  carvedilol 12.5 milliGRAM(s) Oral every 12 hours  cefTRIAXone Injectable.      cefTRIAXone Injectable. 2000 milliGRAM(s) IV Push every 24 hours  cloNIDine 0.1 milliGRAM(s) Oral two times a day  guaifenesin/dextromethorphan Oral Liquid 10 milliLiter(s) Oral every 4 hours  heparin   Injectable 5000 Unit(s) SubCutaneous every 12 hours  hydrALAZINE 100 milliGRAM(s) Oral two times a day  isosorbide   mononitrate ER Tablet (IMDUR) 60 milliGRAM(s) Oral daily  levETIRAcetam   Injectable 500 milliGRAM(s) IV Push every 12 hours  oseltamivir 30 milliGRAM(s) Oral <User Schedule>  pantoprazole    Tablet 40 milliGRAM(s) Oral before breakfast  senna 2 Tablet(s) Oral at bedtime  sevelamer carbonate 1600 milliGRAM(s) Oral three times a day with meals  sodium chloride 0.9%. 1000 milliLiter(s) (75 mL/Hr) IV Continuous <Continuous>  tamsulosin 0.4 milliGRAM(s) Oral at bedtime  torsemide 10 milliGRAM(s) Oral daily    MEDICATIONS  (PRN):  acetaminophen     Tablet .. 650 milliGRAM(s) Oral every 6 hours PRN Temp greater or equal to 38C (100.4F), Mild Pain (1 - 3)  aluminum hydroxide/magnesium hydroxide/simethicone Suspension 30 milliLiter(s) Oral every 4 hours PRN Dyspepsia  benzonatate 100 milliGRAM(s) Oral every 8 hours PRN Cough  HYDROmorphone  Injectable 1 milliGRAM(s) IV Push every 3 hours PRN Moderate Pain (4 - 6)  HYDROmorphone  Injectable 2 milliGRAM(s) IV Push every 3 hours PRN Severe Pain (7 - 10)  labetalol Injectable 10 milliGRAM(s) IV Push every 6 hours PRN Systolic blood pressure > 160  LORazepam   Injectable 0.2 milliGRAM(s) IV Push every 12 hours PRN for severe nausea and vomiting  melatonin 3 milliGRAM(s) Oral at bedtime PRN Insomnia  metoclopramide Injectable 10 milliGRAM(s) IV Push every 6 hours PRN nausea vomiting  ondansetron Injectable 4 milliGRAM(s) IV Push every 6 hours PRN Nausea and/or Vomiting      Allergies    penicillin (Other)          Vital Signs Last 24 Hrs  T(C): 36.2 (09 Jan 2025 07:54), Max: 37.2 (08 Jan 2025 18:00)  T(F): 97.2 (09 Jan 2025 07:54), Max: 98.9 (08 Jan 2025 18:00)  HR: 131 (09 Jan 2025 07:54) (122 - 131)  BP: 151/78 (09 Jan 2025 07:54) (134/84 - 159/90)  BP(mean): --  RR: 19 (09 Jan 2025 07:54) (18 - 20)  SpO2: 95% (09 Jan 2025 07:54) (92% - 100%)    Parameters below as of 09 Jan 2025 07:54  Patient On (Oxygen Delivery Method): nasal cannula        PHYSICAL EXAM:    GENERAL: appears  acutely  &  chronically ill in bed  HEAD:  wnl  EYES: open  ENMT:   NECK: veins  up  NERVOUS SYSTEM: alert, oriented    CHEST/LUNG: No 02, active  cough  HEART: No rub; tachycardia RR  ABDOMEN: NT  EXTREMITIES:  Muscle atrophy  noted  LYMPH:   SKIN: No rash    LABS:                        9.5    7.53  )-----------( x        ( 09 Jan 2025 07:27 )             30.9     01-09    145  |  105  |  100.8[H]  ----------------------------<  86  5.9[H]   |  18.0[L]  |  9.89[H]    Ca    8.3[L]      09 Jan 2025 07:27  Phos  10.2     01-09  Mg     2.1     01-09    TPro  7.6  /  Alb  4.1  /  TBili  0.7  /  DBili  x   /  AST  20  /  ALT  11  /  AlkPhos  70  01-09      Urinalysis Basic - ( 09 Jan 2025 07:27 )    Color: x / Appearance: x / SG: x / pH: x  Gluc: 86 mg/dL / Ketone: x  / Bili: x / Urobili: x   Blood: x / Protein: x / Nitrite: x   Leuk Esterase: x / RBC: x / WBC x   Sq Epi: x / Non Sq Epi: x / Bacteria: x      Magnesium: 2.1 mg/dL (01-09 @ 07:27)  Phosphorus: 10.2 mg/dL (01-09 @ 07:27)          RADIOLOGY & ADDITIONAL TESTS:   yes

## 2025-01-09 NOTE — PROGRESS NOTE ADULT - ASSESSMENT
65 y/o M w/ PMH of Afib (s/p watchman and no longer on AC), ESRD (on HD T/Th/Sa; LUE AFV), chronic anemia, chronic thrombocytopenia, rectal bleeding, CAD s/p CABG, aortic & mitral valve replacement, Type A aortic dissection s/p repair complicated by ischemic bowel s/p resection, seizure disorder on Keppra, IE of MV with MSSA and s/p course of Ancef with f/u MUSTAPHA negative for vegetation in 23', presents to the ED for 2 days of sob, nasal congestion, nonproductive cough w/ associated N/V and abd pain.  Pain is periumbilical and emesis is NBNB.  Vitals notable for sinus tachy in 120's, tachypnea and O2 sat 80% ORA, now on 4L NC and sat'ing 95%.  Clinically toxic appearing w/ clear nasal discharge, poor inspiratory effort, scattered rales, and benign abd exam.  Initial w/u significant for trop 70, BNP>33K, AGMA 20, HCO3 20, BUN 72, Cr 7.95, lipase 7 and lactate 1.0, no acute ischemic changes on EKG. UA w/out pyuria.  CTA c/a/p reporting new moderate ascites, possible right pyelo and LLL infiltrate.  Received 1L IVNS, bentyl 10mgx1, reglan 10mg x1, morphine 4mg x3, zofran 4mg x2, sucralfate 1g x1 in ED.  Will admit for acute hypoxic respiratory failure and sepsis 2/2 Flu A.    1-Acute hypoxic respiratory failure 2/2 viral PNA  2-Sepsis 2/2 Flu A   - Sinus tachy, tachypnea ABG ordered encourage to keep oxygen on   - cont tamiflu for influenza   add azithro for bronchitis   - Supportive care with cough suppressant and expectorant   cont tele     2-Abdominal pain, N/V possible 2/2 above vs ascites   - GI following, randy recs   - c/w anti-emetics and analgesics prn     3-Type II MI likely demand 2/2 above vs poor renal clearance   - Trop 70 and BNP >33K but no chest pain   - EKG w/ no acute ischemic changes    4-AMGA likely 2/2 uremia and sepsis  - Serum HCO3 mildly low and Lactate normal   - Repeat CMP tomorrow to reassess     5-ESRD with hyperkalemia   stat HD per nephrology today d.w Dr Victoria   - On HD t/th/sa and due for HD today   - Renally dose medications      6-Chronic normocytic anemia   Chronic thrombocytopenia  - H/H currently better than baseline (ranges 7-8.5), serial CBC    7*h/o Afib  - Currently in sinus tachy on monitor and likely from sepsis   - s/p watchman and no longer on AC  - c/w carvedilol for rate control     8-CAD s/p CABG  HTN / HLD  - resume asa   - c/w statin therapy   - c/w clonidine, isosorbide, carvedilol, amlodipine, torsemide    9-Seizure disorder   - c/w keppra IV    10-BPH  - c/w flomax     11-Incidental CT finding   - Readministration of 1.5 cm sized enhancing right renal lesion, concerning for neoplasm  - Refer to heme/onc on discharge     12-Aortic dissection  - h/o known aortic dissection, appears stable on CT  - out pt f/u    VTE ppx:  heparin sq bid   Dispo: Acute improvement in resp status

## 2025-01-09 NOTE — PROGRESS NOTE ADULT - SUBJECTIVE AND OBJECTIVE BOX
Patient is a 64y old  Male who presents with a chief complaint of acute hypoxic respiratory failure 2/2 flu A     chart reviewed   Pt is off oxygen refuses to wear it per nurse , with severe hypoxia   I told pt to put oxygen and cont to wear with nurse now after oxygen ox sat 91 %   tachypneic  , tachycardic on tele   pt denies CP , resp distress         ALLERGIES:  penicillin (Other)    MEDICATIONS  (STANDING):  amLODIPine   Tablet 5 milliGRAM(s) Oral daily  atorvastatin 40 milliGRAM(s) Oral at bedtime  azithromycin  IVPB      carvedilol 12.5 milliGRAM(s) Oral every 12 hours  cefTRIAXone Injectable.      cefTRIAXone Injectable. 2000 milliGRAM(s) IV Push every 24 hours  cloNIDine 0.1 milliGRAM(s) Oral two times a day  epoetin yolanda (EPOGEN) Injectable 12767 Unit(s) IV Push <User Schedule>  guaifenesin/dextromethorphan Oral Liquid 10 milliLiter(s) Oral every 4 hours  heparin   Injectable 5000 Unit(s) SubCutaneous every 12 hours  hydrALAZINE 100 milliGRAM(s) Oral two times a day  isosorbide   mononitrate ER Tablet (IMDUR) 60 milliGRAM(s) Oral daily  levETIRAcetam   Injectable 500 milliGRAM(s) IV Push every 12 hours  methylPREDNISolone sodium succinate Injectable 40 milliGRAM(s) IV Push two times a day  oseltamivir 30 milliGRAM(s) Oral <User Schedule>  pantoprazole    Tablet 40 milliGRAM(s) Oral before breakfast  senna 2 Tablet(s) Oral at bedtime  sevelamer carbonate 1600 milliGRAM(s) Oral three times a day with meals  sodium chloride 0.9%. 1000 milliLiter(s) (75 mL/Hr) IV Continuous <Continuous>  sodium zirconium cyclosilicate 10 Gram(s) Oral once  tamsulosin 0.4 milliGRAM(s) Oral at bedtime  torsemide 10 milliGRAM(s) Oral daily    MEDICATIONS  (PRN):  acetaminophen     Tablet .. 650 milliGRAM(s) Oral every 6 hours PRN Temp greater or equal to 38C (100.4F), Mild Pain (1 - 3)  aluminum hydroxide/magnesium hydroxide/simethicone Suspension 30 milliLiter(s) Oral every 4 hours PRN Dyspepsia  benzonatate 100 milliGRAM(s) Oral every 8 hours PRN Cough  HYDROmorphone  Injectable 1 milliGRAM(s) IV Push every 3 hours PRN Moderate Pain (4 - 6)  HYDROmorphone  Injectable 2 milliGRAM(s) IV Push every 3 hours PRN Severe Pain (7 - 10)  labetalol Injectable 10 milliGRAM(s) IV Push every 6 hours PRN Systolic blood pressure > 160  LORazepam   Injectable 0.2 milliGRAM(s) IV Push every 12 hours PRN for severe nausea and vomiting  melatonin 3 milliGRAM(s) Oral at bedtime PRN Insomnia  metoclopramide Injectable 10 milliGRAM(s) IV Push every 6 hours PRN nausea vomiting  ondansetron Injectable 4 milliGRAM(s) IV Push every 6 hours PRN Nausea and/or Vomiting    Vital Signs Last 24 Hrs  T(F): 97.2 (09 Jan 2025 07:54), Max: 98.9 (08 Jan 2025 18:00)  HR: 131 (09 Jan 2025 07:54) (122 - 131)  BP: 151/78 (09 Jan 2025 07:54) (134/84 - 159/90)  RR: 19 (09 Jan 2025 07:54) (18 - 20)  SpO2: 95% (09 Jan 2025 07:54) (92% - 100%)  I&O's Summary      PHYSICAL EXAM:  General:   ENT:   Neck:   Lungs:   Cardio: RRR, S1/S2, No murmur  Abdomen: Soft, Nontender, Nondistended; Bowel sounds present  Extremities: No calf tenderness, No pitting edema    LABS:                        9.5    7.53  )-----------( x        ( 09 Jan 2025 07:27 )             30.9     01-09    145  |  105  |  100.8  ----------------------------<  86  5.9   |  18.0  |  9.89    Ca    8.3      09 Jan 2025 07:27  Phos  10.2     01-09  Mg     2.1     01-09    TPro  7.6  /  Alb  4.1  /  TBili  0.7  /  DBili  x   /  AST  20  /  ALT  11  /  AlkPhos  70  01-09      Lipase: 35 U/L (01-07-25 @ 03:00)      PT/INR - ( 07 Jan 2025 03:00 )   PT: 16.4 sec;   INR: 1.42 ratio         PTT - ( 07 Jan 2025 03:00 )  PTT:35.3 sec  Lactate, Blood: 1.3 mmol/L (01-08 @ 12:25)          01-08 Chol 105 mg/dL LDL -- HDL 58 mg/dL Trig 78 mg/dL    03:00 - VBG - pH: 7.410 | pCO2: 35    | pO2: 186   | Lactate: 1.00                 Urinalysis Basic - ( 09 Jan 2025 07:27 )    Color: x / Appearance: x / SG: x / pH: x  Gluc: 86 mg/dL / Ketone: x  / Bili: x / Urobili: x   Blood: x / Protein: x / Nitrite: x   Leuk Esterase: x / RBC: x / WBC x   Sq Epi: x / Non Sq Epi: x / Bacteria: x        Culture - Blood (collected 07 Jan 2025 17:00)  Source: .Blood BLOOD  Preliminary Report (09 Jan 2025 01:02):    No growth at 24 hours    Culture - Blood (collected 07 Jan 2025 16:55)  Source: .Blood BLOOD  Preliminary Report (09 Jan 2025 01:02):    No growth at 24 hours        RADIOLOGY & ADDITIONAL TESTS:       Patient is a 64y old  Male who presents with a chief complaint of acute hypoxic respiratory failure 2/2 flu A     chart reviewed   Pt is off oxygen refuses to wear it per nurse , with severe hypoxia   I told pt to put oxygen and cont to wear with nurse now after oxygen ox sat 91 %   tachypneic  , tachycardic on tele   pt denies CP , resp distress         ALLERGIES:  penicillin (Other)    MEDICATIONS  (STANDING):  amLODIPine   Tablet 5 milliGRAM(s) Oral daily  atorvastatin 40 milliGRAM(s) Oral at bedtime  azithromycin  IVPB      carvedilol 12.5 milliGRAM(s) Oral every 12 hours  cefTRIAXone Injectable.      cefTRIAXone Injectable. 2000 milliGRAM(s) IV Push every 24 hours  cloNIDine 0.1 milliGRAM(s) Oral two times a day  epoetin yolanda (EPOGEN) Injectable 64604 Unit(s) IV Push <User Schedule>  guaifenesin/dextromethorphan Oral Liquid 10 milliLiter(s) Oral every 4 hours  heparin   Injectable 5000 Unit(s) SubCutaneous every 12 hours  hydrALAZINE 100 milliGRAM(s) Oral two times a day  isosorbide   mononitrate ER Tablet (IMDUR) 60 milliGRAM(s) Oral daily  levETIRAcetam   Injectable 500 milliGRAM(s) IV Push every 12 hours  methylPREDNISolone sodium succinate Injectable 40 milliGRAM(s) IV Push two times a day  oseltamivir 30 milliGRAM(s) Oral <User Schedule>  pantoprazole    Tablet 40 milliGRAM(s) Oral before breakfast  senna 2 Tablet(s) Oral at bedtime  sevelamer carbonate 1600 milliGRAM(s) Oral three times a day with meals  sodium chloride 0.9%. 1000 milliLiter(s) (75 mL/Hr) IV Continuous <Continuous>  sodium zirconium cyclosilicate 10 Gram(s) Oral once  tamsulosin 0.4 milliGRAM(s) Oral at bedtime  torsemide 10 milliGRAM(s) Oral daily    MEDICATIONS  (PRN):  acetaminophen     Tablet .. 650 milliGRAM(s) Oral every 6 hours PRN Temp greater or equal to 38C (100.4F), Mild Pain (1 - 3)  aluminum hydroxide/magnesium hydroxide/simethicone Suspension 30 milliLiter(s) Oral every 4 hours PRN Dyspepsia  benzonatate 100 milliGRAM(s) Oral every 8 hours PRN Cough  HYDROmorphone  Injectable 1 milliGRAM(s) IV Push every 3 hours PRN Moderate Pain (4 - 6)  HYDROmorphone  Injectable 2 milliGRAM(s) IV Push every 3 hours PRN Severe Pain (7 - 10)  labetalol Injectable 10 milliGRAM(s) IV Push every 6 hours PRN Systolic blood pressure > 160  LORazepam   Injectable 0.2 milliGRAM(s) IV Push every 12 hours PRN for severe nausea and vomiting  melatonin 3 milliGRAM(s) Oral at bedtime PRN Insomnia  metoclopramide Injectable 10 milliGRAM(s) IV Push every 6 hours PRN nausea vomiting  ondansetron Injectable 4 milliGRAM(s) IV Push every 6 hours PRN Nausea and/or Vomiting    Vital Signs Last 24 Hrs  T(F): 97.2 (09 Jan 2025 07:54), Max: 98.9 (08 Jan 2025 18:00)  HR: 131 (09 Jan 2025 07:54) (122 - 131)  BP: 151/78 (09 Jan 2025 07:54) (134/84 - 159/90)  RR: 19 (09 Jan 2025 07:54) (18 - 20)  SpO2: 95% (09 Jan 2025 07:54) (92% - 100%)  I&O's Summary      PHYSICAL EXAM:  General: resp distress tachpneic   Neck: supple   Lungs: bilateral rhonchi coarse BS   Cardio: RRR, S1/S2, No murmur  Abdomen: Soft, Nontender, Nondistended; Bowel sounds present  Extremities: No calf tenderness, No pitting edema  Skin warm pale color     LABS:                        9.5    7.53  )-----------( x        ( 09 Jan 2025 07:27 )             30.9     01-09    145  |  105  |  100.8  ----------------------------<  86  5.9   |  18.0  |  9.89    Ca    8.3      09 Jan 2025 07:27  Phos  10.2     01-09  Mg     2.1     01-09    TPro  7.6  /  Alb  4.1  /  TBili  0.7  /  DBili  x   /  AST  20  /  ALT  11  /  AlkPhos  70  01-09      Lipase: 35 U/L (01-07-25 @ 03:00)      PT/INR - ( 07 Jan 2025 03:00 )   PT: 16.4 sec;   INR: 1.42 ratio         PTT - ( 07 Jan 2025 03:00 )  PTT:35.3 sec  Lactate, Blood: 1.3 mmol/L (01-08 @ 12:25)          01-08 Chol 105 mg/dL LDL -- HDL 58 mg/dL Trig 78 mg/dL    03:00 - VBG - pH: 7.410 | pCO2: 35    | pO2: 186   | Lactate: 1.00                 Urinalysis Basic - ( 09 Jan 2025 07:27 )    Color: x / Appearance: x / SG: x / pH: x  Gluc: 86 mg/dL / Ketone: x  / Bili: x / Urobili: x   Blood: x / Protein: x / Nitrite: x   Leuk Esterase: x / RBC: x / WBC x   Sq Epi: x / Non Sq Epi: x / Bacteria: x        Culture - Blood (collected 07 Jan 2025 17:00)  Source: .Blood BLOOD  Preliminary Report (09 Jan 2025 01:02):    No growth at 24 hours    Culture - Blood (collected 07 Jan 2025 16:55)  Source: .Blood BLOOD  Preliminary Report (09 Jan 2025 01:02):    No growth at 24 hours        RADIOLOGY & ADDITIONAL TESTS:  < from: US Abdomen Limited (01.08.25 @ 22:59) >  IMPRESSION:  Mild volume ascites.    Cholelithiasis. Gallbladder wall measures the upper limits of normal, 0.3   cm.    Right renal increased parenchymal echogenicity, compatible with medical   renal disease.        < end of copied text >  < from: US Duplex Venous Lower Ext Complete, Bilateral (01.08.25 @ 22:54) >  IMPRESSION:  No evidence of deep venous thrombosis in either lower extremity.      --- End of Report ---            < end of copied text >  < from: CT Angio Chest Aorta w/wo IV Cont (01.07.25 @ 05:53) >  IMPRESSION:  Known aortic dissection from the aortic arch extending to the right   external iliac artery, right brachiocephalic and left subclavian arteries   and bilateral renal arteries. No interval progression.    Redemonstration of 1.5 cm sized enhancing right renal lesion, concerning   for neoplasm.  Wedge-shaped hypodensity in the right kidney-the possibility of infection   cannot be excluded.    Moderate ascites, new since prior.    Nodular infiltration in the left lower lobe, concerning for pneumonia.          < end of copied text >

## 2025-01-09 NOTE — PROGRESS NOTE ADULT - ASSESSMENT
A) Flu; CRF, Hypertension; Sinus  Tachycardia; Hyperkalemia, AGMA; Anemia.    P) HD  today, Epogen. Needs to  wear  his 02.

## 2025-01-10 DIAGNOSIS — I47.19 OTHER SUPRAVENTRICULAR TACHYCARDIA: ICD-10-CM

## 2025-01-10 LAB
ALBUMIN SERPL ELPH-MCNC: 4 G/DL — SIGNIFICANT CHANGE UP (ref 3.3–5.2)
ALP SERPL-CCNC: 72 U/L — SIGNIFICANT CHANGE UP (ref 40–120)
ALT FLD-CCNC: 11 U/L — SIGNIFICANT CHANGE UP
ANION GAP SERPL CALC-SCNC: 22 MMOL/L — HIGH (ref 5–17)
AST SERPL-CCNC: 22 U/L — SIGNIFICANT CHANGE UP
BILIRUB SERPL-MCNC: 0.7 MG/DL — SIGNIFICANT CHANGE UP (ref 0.4–2)
BUN SERPL-MCNC: 125.5 MG/DL — HIGH (ref 8–20)
CALCIUM SERPL-MCNC: 8.5 MG/DL — SIGNIFICANT CHANGE UP (ref 8.4–10.5)
CHLORIDE SERPL-SCNC: 102 MMOL/L — SIGNIFICANT CHANGE UP (ref 96–108)
CO2 SERPL-SCNC: 18 MMOL/L — LOW (ref 22–29)
CREAT SERPL-MCNC: 11.28 MG/DL — HIGH (ref 0.5–1.3)
EGFR: 5 ML/MIN/1.73M2 — LOW
GLUCOSE SERPL-MCNC: 127 MG/DL — HIGH (ref 70–99)
HBV CORE AB SER-ACNC: SIGNIFICANT CHANGE UP
HBV CORE IGM SER-ACNC: SIGNIFICANT CHANGE UP
HBV SURFACE AB SER-ACNC: 4.8 MIU/ML — LOW
HBV SURFACE AG SER-ACNC: SIGNIFICANT CHANGE UP
HCV AB S/CO SERPL IA: 0.28 S/CO — SIGNIFICANT CHANGE UP (ref 0–0.99)
HCV AB SERPL-IMP: SIGNIFICANT CHANGE UP
POTASSIUM SERPL-MCNC: 5.7 MMOL/L — HIGH (ref 3.5–5.3)
POTASSIUM SERPL-SCNC: 5.7 MMOL/L — HIGH (ref 3.5–5.3)
PROT SERPL-MCNC: 7.6 G/DL — SIGNIFICANT CHANGE UP (ref 6.6–8.7)
SODIUM SERPL-SCNC: 142 MMOL/L — SIGNIFICANT CHANGE UP (ref 135–145)

## 2025-01-10 PROCEDURE — 76937 US GUIDE VASCULAR ACCESS: CPT | Mod: 26,59

## 2025-01-10 PROCEDURE — 99222 1ST HOSP IP/OBS MODERATE 55: CPT | Mod: FS

## 2025-01-10 PROCEDURE — 99233 SBSQ HOSP IP/OBS HIGH 50: CPT

## 2025-01-10 PROCEDURE — 36569 INSJ PICC 5 YR+ W/O IMAGING: CPT

## 2025-01-10 RX ORDER — DIPHENHYDRAMINE HCL 25 MG
25 TABLET ORAL ONCE
Refills: 0 | Status: COMPLETED | OUTPATIENT
Start: 2025-01-10 | End: 2025-01-10

## 2025-01-10 RX ORDER — METOPROLOL TARTRATE 50 MG
5 TABLET ORAL ONCE
Refills: 0 | Status: COMPLETED | OUTPATIENT
Start: 2025-01-10 | End: 2025-01-10

## 2025-01-10 RX ORDER — HYDRALAZINE HYDROCHLORIDE 10 MG/1
5 TABLET ORAL EVERY 6 HOURS
Refills: 0 | Status: DISCONTINUED | OUTPATIENT
Start: 2025-01-10 | End: 2025-01-10

## 2025-01-10 RX ORDER — METOPROLOL TARTRATE 50 MG
25 TABLET ORAL
Refills: 0 | Status: DISCONTINUED | OUTPATIENT
Start: 2025-01-10 | End: 2025-01-10

## 2025-01-10 RX ORDER — DILTIAZEM HYDROCHLORIDE 300 MG/1
5 CAPSULE, COATED, EXTENDED RELEASE ORAL EVERY 6 HOURS
Refills: 0 | Status: DISCONTINUED | OUTPATIENT
Start: 2025-01-10 | End: 2025-01-11

## 2025-01-10 RX ORDER — METOPROLOL TARTRATE 50 MG
25 TABLET ORAL THREE TIMES A DAY
Refills: 0 | Status: DISCONTINUED | OUTPATIENT
Start: 2025-01-10 | End: 2025-01-12

## 2025-01-10 RX ORDER — EPOETIN ALFA 2000 [IU]/ML
10000 SOLUTION INTRAVENOUS; SUBCUTANEOUS ONCE
Refills: 0 | Status: COMPLETED | OUTPATIENT
Start: 2025-01-10 | End: 2025-01-10

## 2025-01-10 RX ADMIN — Medication 2 MILLIGRAM(S): at 10:22

## 2025-01-10 RX ADMIN — HEPARIN SODIUM 5000 UNIT(S): 1000 INJECTION, SOLUTION INTRAVENOUS; SUBCUTANEOUS at 06:04

## 2025-01-10 RX ADMIN — Medication 2 MILLIGRAM(S): at 20:08

## 2025-01-10 RX ADMIN — Medication 2 MILLIGRAM(S): at 15:50

## 2025-01-10 RX ADMIN — Medication 2 MILLIGRAM(S): at 15:16

## 2025-01-10 RX ADMIN — AZITHROMYCIN MONOHYDRATE 255 MILLIGRAM(S): 200 POWDER, FOR SUSPENSION ORAL at 09:23

## 2025-01-10 RX ADMIN — LORAZEPAM 0.2 MILLIGRAM(S): 1 TABLET ORAL at 19:51

## 2025-01-10 RX ADMIN — Medication 2 MILLIGRAM(S): at 09:22

## 2025-01-10 RX ADMIN — EPOETIN ALFA 10000 UNIT(S): 2000 SOLUTION INTRAVENOUS; SUBCUTANEOUS at 16:03

## 2025-01-10 RX ADMIN — Medication 5 MILLIGRAM(S): at 15:55

## 2025-01-10 RX ADMIN — Medication 25 MILLIGRAM(S): at 15:52

## 2025-01-10 RX ADMIN — LEVALBUTEROL 0.63 MILLIGRAM(S): 1.25 SOLUTION RESPIRATORY (INHALATION) at 09:21

## 2025-01-10 RX ADMIN — DILTIAZEM HYDROCHLORIDE 5 MILLIGRAM(S): 300 CAPSULE, COATED, EXTENDED RELEASE ORAL at 13:46

## 2025-01-10 RX ADMIN — LEVALBUTEROL 0.63 MILLIGRAM(S): 1.25 SOLUTION RESPIRATORY (INHALATION) at 20:51

## 2025-01-10 RX ADMIN — LEVETIRACETAM 500 MILLIGRAM(S): 100 SOLUTION ORAL at 06:03

## 2025-01-10 NOTE — CONSULT NOTE ADULT - ASSESSMENT
This is a 63 y/o M w/ PMH of Afib (s/p watchman and no longer on AC), ESRD (on HD T/Th/Sa; LUE AFV), chronic anemia, chronic thrombocytopenia, rectal bleeding, CAD s/p CABG, aortic & mitral valve replacement, Type A aortic dissection s/p repair complicated by ischemic bowel s/p resection, seizure disorder on Keppra, IE of MV with MSSA and s/p course of Ancef with f/u MUSTAPHA negative for vegetation in 23', presents to the ED for 2 days of sob, nasal congestion, nonproductive cough w/ associated N/V and abd pain. Patient positive for Flu. GI consulted for ascites.    Colonoscopy (11.11.24) Anatomy consistent with surgical history of subtotal colectomy. Rectum with poor preparation with solid stool. The end-to-side ileorectal anastomosis site was located 10cm from anal verge. There was no active bleeding, however anastomosis site was noted to have neovascularization.    #new ascites  CT A/P (01.7.25) Moderate ascites, new since prior. Nodular infiltration in the left lower lobe, concerning for pneumonia.  Known aortic dissection from the aortic arch extending to the right external iliac artery, right brachiocephalic and left subclavian arteries and bilateral renal arteries. No interval progression.  Liver enzymes within normal   No cirrhosis on CT imaging     - Recommend diagnostic paracentesis to r/o SBP  - Continue supportive management for Flu A  - No plans for urgent/emergent EGD at this time   - Infectious workup per primary team   _________________________________________________________________  Assessment and recommendations are final when note is signed by the attending physician.     
65 y/o M w/ PMH of Afib (s/p watchman and no longer on AC), ESRD (on HD T/Th/Sa; LUE AFV), chronic anemia, chronic thrombocytopenia, rectal bleeding, CAD s/p CABG, aortic & mitral valve replacement, Type A aortic dissection s/p repair complicated by ischemic bowel s/p resection, seizure disorder on Keppra, IE of MV with MSSA and s/p course of Ancef with f/u MUSTAPHA negative for vegetation in 23', presents to the ED for 2 days of sob, nasal congestion, nonproductive cough w/ associated N/V and abd pain. Found to be FluA positive and persistently tachycardic.    #Tachycardia  - EKG and telemetry reviewed: sinus tachycardia  - Most likely in the setting of active flu infection and hypoxia  - Patient also missed HD due to refusal. Patient amenable to HD today.=  - Patient hypoxic to 80s without NC. Please encourage NC for oxygenation.  - C/w IV cardizem PRN  - Transition coreg -> Metoprolol 25mg BID  INCOMPLETE
A) Active  Flu, CRF, Renal lesion, long  aortic dissection  chronic, anemia with  thrombocytopenia - chronic.    P) Pt  declined  HD today but  states he  will go tomorrow - ordered placed  for am, pt  consented.

## 2025-01-10 NOTE — CONSULT NOTE ADULT - SUBJECTIVE AND OBJECTIVE BOX
[facility]                                              CARDIOLOGY AT [address], [place] [address]                                             Telephone: [phone]. Fax: [phone]                                                       CARDIOLOGY CONSULTATION NOTE                                                                                             History obtained by: Patient and medical record  Community Cardiologist: Dr. Frankel   obtained: Yes [ ] No [ ]  Reason for Consultation: tachycardia  Available out pt records reviewed: Yes [ ] No [ ]    Chief complaint:    Patient is a 64y old  Male who presents with a chief complaint of acute hypoxic respiratory failure 2/2 flu A (10 Bryan 2025 11:15)      HPI:  63 y/o M w/ PMH of Afib (s/p watchman and no longer on AC), ESRD (on HD T/Th/Sa; LUE AFV), chronic anemia, chronic thrombocytopenia, rectal bleeding, CAD s/p CABG, aortic & mitral valve replacement, Type A aortic dissection s/p repair complicated by ischemic bowel s/p resection, seizure disorder on Keppra, IE of MV with MSSA and s/p course of Ancef with f/u MUSTAPHA negative for vegetation in 23', presents to the ED for 2 days of sob, nasal congestion, nonproductive cough w/ associated N/V and abd pain.  Pain is periumbilical and emesis is NBNB.  He reports subjective fevers and chills.  He denies diarrhea, sick contacts or recent travel.  Pt also denies cp, palpitations, LE edema and denies black/dark stools or dysuria.     Cardiology consulted for persistent tachycardia. Patient was seen and examined by bedside. HR consistently in the 120s and patient hypoxic to 80s (patient not using NC). States that he does not feel well due to flu-like symptoms and that was why he was refusing dialysis and PO meds. Continuing to feel feverish, abdominal pain, and headache. Amenable to dialysis today. Patient denies chest pain.      CARDIAC TESTING   ECHO:  < from: TTE W or WO Ultrasound Enhancing Agent (12.04.24 @ 08:51) >  CONCLUSIONS:      1. Left ventricularcavity is normal in size. Left ventricular systolic function is low normal with an ejection fraction of 55 % by Guadarrama's method of disks with an ejection fraction visually estimated at 50 to 55 %.   2. Reduced right ventricular systolic function.   3. Left atrium is normal in size.   4. Estimated pulmonary artery systolic pressure is 55 mmHg.   5. Moderate left ventricular hypertrophy.    < end of copied text >    STRESS:    CATH:     ELECTROPHYSIOLOGY:     PAST MEDICAL HISTORY  CHF (congestive heart failure)    CVA (cerebral vascular accident)    Chronic kidney disease    Seizures    HTN (hypertension)    Hyperlipemia    COVID-19    Atrial fibrillation    ESRD on dialysis    Former smoker    History of cocaine use    H/O aortic dissection    H/O aortic valve insufficiency    Mitral regurgitation    GIB (gastrointestinal bleeding)    CAD (coronary artery disease)    Chronic atrial fibrillation    Aneurysm of artery of upper extremity    Anemia of chronic disease    End stage renal disease    Myocardial infarction    COVID-19 vaccine series completed    Port-A-Cath in place        PAST SURGICAL HISTORY  Aorta disorder    H/O colectomy    Status post double vessel coronary artery bypass    S/P AVR (aortic valve replacement)    S/P MVR (mitral valve replacement)    H/O aortic dissection    AV fistula    History of renal transplant    Presence of Watchman left atrial appendage closure device        SOCIAL HISTORY:  Denies smoking/alcohol/drugs  CIGARETTES:     ALCOHOL:  DRUGS:    FAMILY HISTORY:  FH: hypertension    Family history of cardiac disorder (Mother)  mother      Family History of Cardiovascular Disease:  Yes [ ] No [ ]  Coronary Artery Disease in first degree relative: Yes [ ] No [ ]  Sudden Cardiac Death in First degree relative: Yes [ ] No [ ]    HOME MEDICATIONS:  aspirin 81 mg oral delayed release tablet: 1 tab(s) orally once a day (07 Jan 2025 12:55)  atorvastatin 40 mg oral tablet: 1 tab(s) orally once a day (at bedtime) (07 Jan 2025 12:55)  carvedilol 12.5 mg oral tablet: 1 tab(s) orally every 12 hours (07 Jan 2025 12:55)  clopidogrel 75 mg oral tablet: 1 tab(s) orally once a day (07 Jan 2025 12:55)  diphenoxylate-atropine 2.5 mg-0.025 mg oral tablet: 2 tab(s) orally every 6 hours as needed (07 Jan 2025 12:55)  hydrALAZINE 100 mg oral tablet: 1 tab(s) orally 2 times a day (07 Jan 2025 12:55)  isosorbide mononitrate 60 mg oral tablet, extended release: 1 tab(s) orally once a day (07 Jan 2025 12:55)  levETIRAcetam 500 mg oral tablet: 1 tab(s) orally 2 times a day (07 Jan 2025 12:55)  risankizumab: every 3 weeks (07 Jan 2025 14:03)  tamsulosin 0.4 mg oral capsule: 1 cap(s) orally once a day (at bedtime) (07 Jan 2025 12:55)  torsemide 20 mg oral tablet: 1 tab(s) orally every other day (07 Jan 2025 12:55)      CURRENT CARDIAC MEDICATIONS:  cloNIDine 0.1 milliGRAM(s) Oral two times a day  diltiazem Injectable 5 milliGRAM(s) IV Push every 6 hours PRN HR over 120 sustaining  hydrALAZINE 50 milliGRAM(s) Oral two times a day  hydrALAZINE Injectable 5 milliGRAM(s) IV Push every 6 hours PRN sbp > 160  isosorbide   mononitrate ER Tablet (IMDUR) 60 milliGRAM(s) Oral daily  labetalol Injectable 10 milliGRAM(s) IV Push every 6 hours PRN Systolic blood pressure > 160  metoprolol tartrate 25 milliGRAM(s) Oral three times a day      CURRENT OTHER MEDICATIONS:  benzonatate 100 milliGRAM(s) Oral every 8 hours PRN Cough  guaifenesin/dextromethorphan Oral Liquid 10 milliLiter(s) Oral every 4 hours  levalbuterol Inhalation 0.63 milliGRAM(s) Inhalation every 6 hours  acetaminophen     Tablet .. 650 milliGRAM(s) Oral every 6 hours PRN Temp greater or equal to 38C (100.4F), Mild Pain (1 - 3)  HYDROmorphone  Injectable 1 milliGRAM(s) IV Push every 3 hours PRN Moderate Pain (4 - 6)  HYDROmorphone  Injectable 2 milliGRAM(s) IV Push every 3 hours PRN Severe Pain (7 - 10)  levETIRAcetam   Injectable 500 milliGRAM(s) IV Push every 12 hours  LORazepam   Injectable 0.2 milliGRAM(s) IV Push every 12 hours PRN for severe nausea and vomiting  melatonin 3 milliGRAM(s) Oral at bedtime PRN Insomnia  metoclopramide Injectable 10 milliGRAM(s) IV Push every 6 hours PRN nausea vomiting  ondansetron Injectable 4 milliGRAM(s) IV Push every 6 hours PRN Nausea and/or Vomiting  aluminum hydroxide/magnesium hydroxide/simethicone Suspension 30 milliLiter(s) Oral every 4 hours PRN Dyspepsia  pantoprazole    Tablet 40 milliGRAM(s) Oral before breakfast  senna 2 Tablet(s) Oral at bedtime  aspirin enteric coated 81 milliGRAM(s) Oral daily  atorvastatin 40 milliGRAM(s) Oral at bedtime  azithromycin  IVPB      azithromycin  IVPB 500 milliGRAM(s) IV Intermittent every 24 hours, Stop order after: 4 Doses  epoetin yolanda-epbx (RETACRIT) Injectable 11445 Unit(s) IV Push <User Schedule>  heparin   Injectable 5000 Unit(s) SubCutaneous every 12 hours  methylPREDNISolone sodium succinate Injectable 40 milliGRAM(s) IV Push two times a day  oseltamivir 30 milliGRAM(s) Oral <User Schedule>, Stop order after: 5 Days  sevelamer carbonate 1600 milliGRAM(s) Oral three times a day with meals  sodium zirconium cyclosilicate 10 Gram(s) Oral once, Stop order after: 1 Doses  tamsulosin 0.4 milliGRAM(s) Oral at bedtime      ALLERGIES:   penicillin (Other)      REVIEW OF SYMPTOMS:   CONSTITUTIONAL: No fever, no chills, no weight loss, no weight gain, no fatigue   ENMT:  No vertigo; No sinus or throat pain  NECK: No pain or stiffness  CARDIOVASCULAR: No chest pain, no dyspnea, no syncope/presyncope, no palpitations, no dizziness, no Orthopnea, no Paroxsymal nocturnal dyspnea  RESPIRATORY: no Shortness of breath, no cough, no wheezing  : No dysuria, no hematuria   GI: No dark color stool, no nausea, no diarrhea, no constipation, no abdominal pain   NEURO: No headache, no slurred speech   MUSCULOSKELETAL: No joint pain or swelling; No muscle, back, or extremity pain  PSYCH: No agitation, no anxiety.    ALL OTHER REVIEW OF SYSTEMS ARE NEGATIVE.    VITAL SIGNS:  T(C): 36.8 (01-10-25 @ 11:17), Max: 36.9 (01-10-25 @ 05:27)  T(F): 98.3 (01-10-25 @ 11:17), Max: 98.5 (01-10-25 @ 05:27)  HR: 127 (01-10-25 @ 11:17) (121 - 127)  BP: 157/87 (01-10-25 @ 11:17) (132/89 - 169/90)  RR: 18 (01-10-25 @ 11:17) (18 - 20)  SpO2: 96% (01-10-25 @ 11:17) (90% - 96%)    INTAKE AND OUTPUT:      PHYSICAL EXAM:  Constitutional: In acute distress.   HEENT: Atraumatic and normocephalic , neck is supple . no JVD. No carotid bruit.  CNS: A&Ox3. No focal deficits.   Respiratory: CTAB  Cardiovascular: RRR normal s1 s2. No murmur. No rubs or gallop.  Gastrointestinal: Soft, non-tender. +Bowel sounds.   Extremities: 2+ Peripheral Pulses, No clubbing, cyanosis, or edema  Psychiatric: Calm. no agitation.   Skin: Warm and dry, no ulcers on extremities     LABS:                            9.5    7.53  )-----------( 60       ( 09 Jan 2025 07:27 )             30.9     01-10    142  |  102  |  125.5[H]  ----------------------------<  127[H]  5.7[H]   |  18.0[L]  |  11.28[H]    Ca    8.5      10 Bryan 2025 06:22  Phos  10.2     01-09  Mg     2.1     01-09    TPro  7.6  /  Alb  4.0  /  TBili  0.7  /  DBili  x   /  AST  22  /  ALT  11  /  AlkPhos  72  01-10      Urinalysis Basic - ( 10 Bryan 2025 06:22 )    Color: x / Appearance: x / SG: x / pH: x  Gluc: 127 mg/dL / Ketone: x  / Bili: x / Urobili: x   Blood: x / Protein: x / Nitrite: x   Leuk Esterase: x / RBC: x / WBC x   Sq Epi: x / Non Sq Epi: x / Bacteria: x              INTERPRETATION OF TELEMETRY:     ECG:     < from: 12 Lead ECG (01.09.25 @ 08:49) >  Ventricular Rate 128 BPM    Atrial Rate 128 BPM    P-R Interval 130 ms    QRS Duration 94 ms    Q-T Interval 328 ms    QTC Calculation(Bazett) 478 ms    R Axis -33 degrees    T Axis 123 degrees    Diagnosis Line Sinus tachycardia  Left axis deviation  ST & T wave abnormality, consider lateral ischemia    < end of copied text >    Prior ECG: Yes [ ] No [ ]    RADIOLOGY & ADDITIONAL STUDIES:   X-ray:     < from: Xray Chest 1 View- PORTABLE-Urgent (01.07.25 @ 03:15) >  INTERPRETATION:  AP chest on January 7, 2025 at 2:59 AM. Patient has   epigastric pain.    Heart magnified by technique.    Sternotomy and prosthetic heart valve again noted.    On December 10, 2024 there is an infiltrate at the right base an also an   infiltrate in the left lower lung field. Slight infiltrate in right upper   lung was also seen and clips in the right axillaryregion were noted.    On present examination these lung findings are almost totally clear.   Clips in the left arm noted.    IMPRESSION: Prior bilateral infiltrates have largely cleared.    < end of copied text >     CT scan:   < from: CT Angio Abdomen and Pelvis w/ IV Cont (01.07.25 @ 05:53) >  IMPRESSION:  Known aortic dissection from the aortic arch extending to the right   external iliac artery, right brachiocephalic and left subclavian arteries   and bilateral renal arteries. No interval progression.    Redemonstration of 1.5 cm sized enhancing right renal lesion, concerning   for neoplasm.  Wedge-shaped hypodensity in the right kidney-the possibility of infection   cannot be excluded.    Moderate ascites, new since prior.    Nodular infiltration in the left lower lobe, concerning for pneumonia.    < end of copied text >      MRI:   US:
Chief Complaint:  Patient is a 64y old  Male who presents with a chief complaint of acute hypoxic respiratory failure 2/2 flu A (07 Jan 2025 14:13)    HPI:  This is a 65 y/o M w/ PMH of Afib (s/p watchman and no longer on AC), ESRD (on HD T/Th/Sa; LUE AFV), chronic anemia, chronic thrombocytopenia, rectal bleeding, CAD s/p CABG, aortic & mitral valve replacement, Type A aortic dissection s/p repair complicated by ischemic bowel s/p resection, seizure disorder on Keppra, IE of MV with MSSA and s/p course of Ancef with f/u MUSTAPHA negative for vegetation in 23', presents to the ED for 2 days of sob, nasal congestion, nonproductive cough w/ associated N/V and abd pain. Patient positive for Flu. GI consulted for ascites. Patient reports mid abdominal pain, nausea, NBNB vomiting starting 2 days ago. He endorses subjective fevers and chills. No sick contacts or recent travel. CT A/P with moderate ascites, liver within normal limits. Liver enzymes within normal limits. Patient reports heavy drinking, and cocaine use, quit 20 years ago. He had a colonoscopy in November 2024 for rectal bleeding, with no source of bleeding found. Denies chest pain, palpitations, bowel changes, hematemesis, hematochezia, and melena.      PAST MEDICAL & SURGICAL HISTORY:  CHF (congestive heart failure)      CVA (cerebral vascular accident)      Seizures  last seizure 10 years ago      HTN (hypertension)      Hyperlipemia      COVID-19  october 2020      Atrial fibrillation      Former smoker      History of cocaine use  remote hx, currently sober      H/O aortic dissection  s/p repair (2010), surgery complicated by bowel ischemia s/p bowel resection and ostomy with reversal      H/O aortic valve insufficiency      Mitral regurgitation      GIB (gastrointestinal bleeding)      CAD (coronary artery disease)  s/p PCI 1998  and CABG x 2 11/2020      Chronic atrial fibrillation      Aneurysm of artery of upper extremity      Anemia of chronic disease      End stage renal disease      Myocardial infarction      COVID-19 vaccine series completed      Port-A-Cath in place      H/O colectomy      Status post double vessel coronary artery bypass  11/2020 Dr Butler      S/P AVR (aortic valve replacement)  (t)AVR 11/2020 Dr Butler      S/P MVR (mitral valve replacement)  (t)MVR 11/2020 Dr Butler      H/O aortic dissection  Type A; emergent repair 2010      AV fistula  LUE      History of renal transplant      Presence of Watchman left atrial appendage closure device          REVIEW OF SYSTEMS:   General: Negative  HEENT: Negative  CV: Negative  Respiratory: Negative  GI: See HPI  : Negative  MSK: Negative  Hematologic: Negative  Skin: Negative    MEDICATIONS:   MEDICATIONS  (STANDING):  amLODIPine   Tablet 5 milliGRAM(s) Oral daily  atorvastatin 40 milliGRAM(s) Oral at bedtime  carvedilol 12.5 milliGRAM(s) Oral every 12 hours  cefTRIAXone Injectable.      cefTRIAXone Injectable. 2000 milliGRAM(s) IV Push once  cloNIDine 0.1 milliGRAM(s) Oral two times a day  guaifenesin/dextromethorphan Oral Liquid 10 milliLiter(s) Oral every 4 hours  heparin   Injectable 5000 Unit(s) SubCutaneous every 12 hours  hydrALAZINE 100 milliGRAM(s) Oral two times a day  isosorbide   mononitrate ER Tablet (IMDUR) 60 milliGRAM(s) Oral daily  levETIRAcetam 500 milliGRAM(s) Oral two times a day  oseltamivir 30 milliGRAM(s) Oral <User Schedule>  pantoprazole    Tablet 40 milliGRAM(s) Oral before breakfast  senna 2 Tablet(s) Oral at bedtime  tamsulosin 0.4 milliGRAM(s) Oral at bedtime  torsemide 10 milliGRAM(s) Oral daily    MEDICATIONS  (PRN):  acetaminophen     Tablet .. 650 milliGRAM(s) Oral every 6 hours PRN Temp greater or equal to 38C (100.4F), Mild Pain (1 - 3)  aluminum hydroxide/magnesium hydroxide/simethicone Suspension 30 milliLiter(s) Oral every 4 hours PRN Dyspepsia  benzonatate 100 milliGRAM(s) Oral every 8 hours PRN Cough  HYDROmorphone  Injectable 0.2 milliGRAM(s) IV Push every 3 hours PRN Moderate Pain (4 - 6)  HYDROmorphone  Injectable 0.5 milliGRAM(s) IV Push every 3 hours PRN Severe Pain (7 - 10)  melatonin 3 milliGRAM(s) Oral at bedtime PRN Insomnia  ondansetron Injectable 4 milliGRAM(s) IV Push every 8 hours PRN Nausea and/or Vomiting          DIET:  Diet, Renal Restrictions:   For patients receiving Renal Replacement - No Protein Restr, No Conc K, No Conc Phos, Low Sodium (01-07-25 @ 13:07) [Active]          ALLERGIES:   Allergies    penicillin (Other)    Intolerances        Substance Use:   (  ) never used  (  ) other:  Tobacco Usage:  (   ) never smoked   (   ) former smoker   (   ) current smoker  (     ) pack year  (        ) last cigarette date  Alcohol Usage:    Family History   IBD (  ) Yes   (  ) No  GI Malignancy (  )  Yes    (  ) No    Health Management  Last Colonoscopy:  Last Endoscopy:     VITAL SIGNS:   Vital Signs Last 24 Hrs  T(C): 36.9 (07 Jan 2025 13:45), Max: 37.1 (06 Jan 2025 23:50)  T(F): 98.4 (07 Jan 2025 13:45), Max: 98.8 (06 Jan 2025 23:50)  HR: 122 (07 Jan 2025 13:45) (118 - 124)  BP: 140/103 (07 Jan 2025 13:45) (135/110 - 174/74)  BP(mean): --  RR: 18 (07 Jan 2025 13:45) (16 - 18)  SpO2: 92% (07 Jan 2025 13:45) (90% - 95%)    Parameters below as of 07 Jan 2025 13:45  Patient On (Oxygen Delivery Method): nasal cannula    O2 Concentration (%): 2  I&O's Summary      PHYSICAL EXAM:   GENERAL:  No acute distress  HEENT:  NC/AT, conjunctiva clear, sclera anicteric  CHEST:  supplemental oxygen, coarse breath sounds  HEART:  Regular rate  ABDOMEN:  Soft, non-tender, non-distended, normoactive bowel sounds, no rebound or guarding  EXTREMITIES: No edema  SKIN:  Warm, dry  NEURO:  Calm, cooperative    LABS:                        9.6    5.18  )-----------( 106      ( 07 Jan 2025 03:00 )             29.3     Hemoglobin: 9.6 g/dL (01-07-25 @ 03:00)    01-07    140  |  100  |  72.0[H]  ----------------------------<  101[H]  4.4   |  20.0[L]  |  7.95[H]    Ca    8.7      07 Jan 2025 03:00  Phos  6.8     01-07  Mg     1.8     01-07    TPro  7.4  /  Alb  4.0  /  TBili  0.9  /  DBili  x   /  AST  21  /  ALT  11  /  AlkPhos  94  01-07    LIVER FUNCTIONS - ( 07 Jan 2025 03:00 )  Alb: 4.0 g/dL / Pro: 7.4 g/dL / ALK PHOS: 94 U/L / ALT: 11 U/L / AST: 21 U/L / GGT: x             PT/INR - ( 07 Jan 2025 03:00 )   PT: 16.4 sec;   INR: 1.42 ratio         PTT - ( 07 Jan 2025 03:00 )  PTT:35.3 sec    Lipase: 35 U/L (01-07-25 @ 03:00)    RADIOLOGY & ADDITIONAL STUDIES:      ACC: [id number] EXAM:  CT ANGIO ABD PELV (W)AW IC   ORDERED BY: OBEY CORTEZ     ACC: [id number] EXAM:  CT ANGIO CHEST AORTA WAWIC   ORDERED BY: OBEY CORTEZ     *** ADDENDUM # 1 ***    These results were discussed by Dr. Walker with Dr. Cortez on 1/7/2025 at   6:18 AM with repeat back.    --- End of Report ---    *** END OF ADDENDUM # 1 ***      PROCEDURE DATE:  01/07/2025          INTERPRETATION:  INDICATION: Dissection study    TECHNIQUE: CT angiogram of the chest was obtained after theintravenous   administration of 90 cc administered mL of Omnipaque 350, 10 cc discarded   discarded. Three-dimensional maximum intensity projection images were   generated.    COMPARISON: 11/5/2024    FINDINGS:  Aorta angiogram: There is an aortic dissection from the aortic arch   extending to the right external iliac artery (9-53). The patient   dissection flap also extends to the right brachiocephalic and left   subclavian arteries, bilateral renal arteries.  Lymph nodes: Mediastinal and right hilar lymph nodes measuring up to 1.3   cm.  Heart/vasculature: Cardiomegaly. Mitral valve replacement. Extensive   atherosclerotic disease.  Airways/lungs/pleura: Nodular infiltration in the left lower lobe (6-107).    Upper abdomen: Cholelithiasis. Subcentimeter left renal cyst. 1.5 cm   sized enhancing lesion in the upper pole of right kidney (9-78). Moderate   volume ascites  LIVER: Within normal limits.  BILE DUCTS: Normal caliber.  GALLBLADDER: Cholelithiasis.  SPLEEN: Within normal limits.  PANCREAS: Within normal limits.  ADRENALS: Within normal limits.  KIDNEYS/URETERS: Subcentimeter left renal cysts. No interval change of   1.5 cm sized enhancing lesion in the upper pole of right kidney.   Wedge-shaped hypodensity in the upper pole of right kidney (6-168).    BLADDER: Within normal limits.  REPRODUCTIVE ORGANS: No prostatic enlargement.    BOWEL: No bowel obstruction. Appendix is normal.  PERITONEUM: No ascites.  VESSELS: Within normal limits.  RETROPERITONEUM/LYMPH NODES: No lymphadenopathy.  ABDOMINAL WALL: Within normal limits.    Bones/soft tissues: Degenerative change.    IMPRESSION:  Known aortic dissection from the aortic arch extending to the right   external iliac artery, right brachiocephalic and left subclavian arteries   and bilateral renal arteries. No interval progression.    Redemonstration of 1.5 cm sized enhancing right renal lesion, concerning   for neoplasm.  Wedge-shaped hypodensity in the right kidney-the possibility of infection   cannot be excluded.    Moderate ascites, new since prior.    Nodular infiltration in the left lower lobe, concerning for pneumonia.        --- End of Report ---    ***Please see the addendum at the top of this report. It may contain   additional important information orchanges.****        PRICE WALKER MD; Attending Radiologist  This document has been electronically signed. Jan 7 2025  6:34AM  1st Addendum: PRICE WALKER MD; Attending Radiologist  The first addendum was electronically signed on: Jan 7 2025  6:42AM.  01-07-25 @ 05:53    ACC: [id number] EXAM:  CT ABDOMEN AND PELVIS   ORDERED BY: OBEY CORTEZ     PROCEDURE DATE:  01/07/2025          INTERPRETATION:  CLINICAL INFORMATION: Lower abdominal pain, nausea,   vomiting    COMPARISON: CT abdomen/pelvis 12/4/2024    CONTRAST/COMPLICATIONS:  IV Contrast: NONE  Oral Contrast: NONE  .    PROCEDURE:  CT of the Abdomen and Pelvis was performed.  Sagittal and coronal reformats were performed.    FINDINGS: Limited evaluation due to lack of intravenous contrast.    LOWER CHEST: Interval resolution of bilateral pleural effusions.   Cardiomegaly. Coronary artery calcifications. Partially visualized mitral   valve repair. Descending thoracic aortic dissection incompletely assessed   without IV contrast.    LIVER: Within normal limits.  BILE DUCTS: Normal caliber.  GALLBLADDER: Cholelithiasis.  SPLEEN: Within normal limits.  PANCREAS: Within normal limits.  ADRENALS: Within normal limits.  KIDNEYS/URETERS: Within normal limits.    BLADDER: Within normal limits.  REPRODUCTIVE ORGANS: Enlarged prostate.    BOWEL: No bowel obstruction. Appendix is not visualized and cannot be   assessed.  PERITONEUM/RETROPERITONEUM: Stable small-moderate volume abdominopelvic   ascites. Fluid tracks into a left inguinal hernia.  VESSELS: Ectatic suprarenal abdominal aorta measuring up to 4.0 cm   unchanged. Abdominal aortic dissection incompletely assessed without IV   contrast. Displaced intimal calcifications redemonstrated.  LYMPH NODES: No lymphadenopathy.  ABDOMINAL WALL: Left inguinal hernia containing fat and small volume of   fluid. Rectus diastases.  BONES: Degenerative changes.    IMPRESSION:  Limited evaluation due to lack of intravenous contrast. Within this   limitation, no significant change compared to 12/4/2024.    Interval resolution of bilateral pleural effusions.    Additional chronic findings as above.      --- End of Report ---      YVONNE TAYLOR MD; Attending Radiologist  This document has been electronically signed. Jan 7 2025  3:32AM  01-07-25 @ 03:09      
Patient is a 64y old  Male who presents with a chief complaint of acute hypoxic respiratory failure 2/2 flu A (08 Jan 2025 13:03)     HPI:  65 y/o M w/ PMH of Afib (s/p watchman and no longer on AC), ESRD (on HD T/Th/Sa; LUE AFV), chronic anemia, chronic thrombocytopenia, rectal bleeding, CAD s/p CABG, aortic & mitral valve replacement, Type A aortic dissection s/p repair complicated by ischemic bowel s/p resection, seizure disorder on Keppra, IE of MV with MSSA and s/p course of Ancef with f/u MUSTAPHA negative for vegetation in 23', presents to the ED for 2 days of sob, nasal congestion, nonproductive cough w/ associated N/V and abd pain.  Pain is periumbilical and emesis is NBNB.  He reports subjective fevers and chills.  He denies diarrhea, sick contacts or recent travel.  Pt also denies cp, palpitations, LE edema and denies black/dark stools or dysuria. pt did not  go  to  dialysis yesterday  because he  was coughing, vomiting. last  HD  was this  past  saturday. pt  goes  to [facility] in [place].   (07 Jan 2025 14:13)      PAST MEDICAL & SURGICAL HISTORY:  CHF (congestive heart failure)      CVA (cerebral vascular accident)      Seizures  last seizure 10 years ago      HTN (hypertension)      Hyperlipemia      COVID-19  october 2020      Atrial fibrillation      Former smoker      History of cocaine use  remote hx, currently sober      H/O aortic dissection  s/p repair (2010), surgery complicated by bowel ischemia s/p bowel resection and ostomy with reversal      H/O aortic valve insufficiency      Mitral regurgitation      GIB (gastrointestinal bleeding)      CAD (coronary artery disease)  s/p PCI 1998  and CABG x 2 11/2020      Chronic atrial fibrillation      Aneurysm of artery of upper extremity      Anemia of chronic disease      End stage renal disease      Myocardial infarction      COVID-19 vaccine series completed      Port-A-Cath in place      H/O colectomy      Status post double vessel coronary artery bypass  11/2020 Dr Butler      S/P AVR (aortic valve replacement)  (t)AVR 11/2020 Dr Butler      S/P MVR (mitral valve replacement)  (t)MVR 11/2020 Dr Butler      H/O aortic dissection  Type A; emergent repair 2010      AV fistula  LUE      History of renal transplant      Presence of Watchman left atrial appendage closure device           FAMILY HISTORY:  FH: hypertension    Family history of cardiac disorder (Mother)  mother    NC    Social History: prior smoker    MEDICATIONS  (STANDING):  amLODIPine   Tablet 5 milliGRAM(s) Oral daily  atorvastatin 40 milliGRAM(s) Oral at bedtime  carvedilol 12.5 milliGRAM(s) Oral every 12 hours  cefTRIAXone Injectable.      cefTRIAXone Injectable. 2000 milliGRAM(s) IV Push every 24 hours  cloNIDine 0.1 milliGRAM(s) Oral two times a day  guaifenesin/dextromethorphan Oral Liquid 10 milliLiter(s) Oral every 4 hours  heparin   Injectable 5000 Unit(s) SubCutaneous every 12 hours  hydrALAZINE 100 milliGRAM(s) Oral two times a day  isosorbide   mononitrate ER Tablet (IMDUR) 60 milliGRAM(s) Oral daily  levETIRAcetam   Injectable 500 milliGRAM(s) IV Push every 12 hours  oseltamivir 30 milliGRAM(s) Oral <User Schedule>  pantoprazole    Tablet 40 milliGRAM(s) Oral before breakfast  senna 2 Tablet(s) Oral at bedtime  sodium chloride 0.9%. 1000 milliLiter(s) (40 mL/Hr) IV Continuous <Continuous>  sodium zirconium cyclosilicate 10 Gram(s) Oral once  tamsulosin 0.4 milliGRAM(s) Oral at bedtime  torsemide 10 milliGRAM(s) Oral daily    MEDICATIONS  (PRN):  acetaminophen     Tablet .. 650 milliGRAM(s) Oral every 6 hours PRN Temp greater or equal to 38C (100.4F), Mild Pain (1 - 3)  aluminum hydroxide/magnesium hydroxide/simethicone Suspension 30 milliLiter(s) Oral every 4 hours PRN Dyspepsia  benzonatate 100 milliGRAM(s) Oral every 8 hours PRN Cough  HYDROmorphone  Injectable 1 milliGRAM(s) IV Push every 3 hours PRN Moderate Pain (4 - 6)  HYDROmorphone  Injectable 2 milliGRAM(s) IV Push every 3 hours PRN Severe Pain (7 - 10)  labetalol Injectable 10 milliGRAM(s) IV Push every 6 hours PRN Systolic blood pressure > 160  melatonin 3 milliGRAM(s) Oral at bedtime PRN Insomnia  metoclopramide Injectable 10 milliGRAM(s) IV Push every 6 hours PRN nausea vomiting  ondansetron Injectable 4 milliGRAM(s) IV Push every 6 hours PRN Nausea and/or Vomiting   Meds reviewed    Allergies    penicillin (Other)        REVIEW OF SYSTEMS:    As above.    ALLERY AND IMMUNOLOGIC: No hives or eczema      Vital Signs Last 24 Hrs  T(C): 36.5 (08 Jan 2025 13:33), Max: 36.8 (07 Jan 2025 16:06)  T(F): 97.7 (08 Jan 2025 13:33), Max: 98.3 (07 Jan 2025 19:34)  HR: 125 (08 Jan 2025 13:33) (95 - 127)  BP: 150/84 (08 Jan 2025 13:33) (146/83 - 191/96)  BP(mean): --  RR: 19 (08 Jan 2025 13:33) (18 - 20)  SpO2: 93% (08 Jan 2025 13:33) (93% - 99%)    Parameters below as of 08 Jan 2025 13:33  Patient On (Oxygen Delivery Method): nasal cannula          PHYSICAL EXAM:    GENERAL: appears acutely ill, oriented.  HEAD:  Atraumatic, Normocephalic  EYES: EOMI, PERRLA, conjunctiva and sclera clear  ENMT: Active  cough  NECK: Supple, neck  veins up supine  NERVOUS SYSTEM:  Alert & Oriented X3, Good concentration; Motor Strength wnl upper and lower extremities;  CHEST/LUNG: bilateral  rhonchi  HEART: Regular rate and rhythm; + murmur, No rub; sternal scar  ABDOMEN: Soft, Nontender, Nondistended; Bowel sounds present  EXTREMITIES:  leg  edema  mild, chronic ; left upper arm access with  bruit  LYMPH: No lymphadenopathy noted  SKIN: No rashes or lesions, pale      LABS:                        10.5   5.85  )-----------( 72       ( 08 Jan 2025 07:17 )             34.3     01-08    141  |  100  |  84.6[H]  ----------------------------<  99  5.0   |  20.0[L]  |  8.64[H]    Ca    9.0      08 Jan 2025 07:17  Phos  9.5     01-08  Mg     2.0     01-08    TPro  8.1  /  Alb  4.5  /  TBili  0.8  /  DBili  x   /  AST  22  /  ALT  11  /  AlkPhos  86  01-08    PT/INR - ( 07 Jan 2025 03:00 )   PT: 16.4 sec;   INR: 1.42 ratio         PTT - ( 07 Jan 2025 03:00 )  PTT:35.3 sec  Urinalysis Basic - ( 08 Jan 2025 07:17 )    Color: x / Appearance: x / SG: x / pH: x  Gluc: 99 mg/dL / Ketone: x  / Bili: x / Urobili: x   Blood: x / Protein: x / Nitrite: x   Leuk Esterase: x / RBC: x / WBC x   Sq Epi: x / Non Sq Epi: x / Bacteria: x      Magnesium: 2.0 mg/dL (01-08 @ 07:17)  Phosphorus: 9.5 mg/dL (01-08 @ 07:17)  Magnesium: 1.8 mg/dL (01-07 @ 14:10)  Phosphorus: 6.8 mg/dL (01-07 @ 14:10)          RADIOLOGY & ADDITIONAL TESTS:

## 2025-01-10 NOTE — PROGRESS NOTE ADULT - SUBJECTIVE AND OBJECTIVE BOX
NEPHROLOGY INTERVAL HPI/OVERNIGHT EVENTS:    Seen on HD earlier   HR still elevated   ST   Cardio follow up noted   No SOB or CP     MEDICATIONS  (STANDING):  aspirin enteric coated 81 milliGRAM(s) Oral daily  atorvastatin 40 milliGRAM(s) Oral at bedtime  azithromycin  IVPB      azithromycin  IVPB 500 milliGRAM(s) IV Intermittent every 24 hours  cloNIDine 0.1 milliGRAM(s) Oral two times a day  epoetin yolanda-epbx (RETACRIT) Injectable 59823 Unit(s) IV Push <User Schedule>  guaifenesin/dextromethorphan Oral Liquid 10 milliLiter(s) Oral every 4 hours  heparin   Injectable 5000 Unit(s) SubCutaneous every 12 hours  hydrALAZINE 50 milliGRAM(s) Oral two times a day  isosorbide   mononitrate ER Tablet (IMDUR) 60 milliGRAM(s) Oral daily  levalbuterol Inhalation 0.63 milliGRAM(s) Inhalation every 6 hours  levETIRAcetam   Injectable 500 milliGRAM(s) IV Push every 12 hours  methylPREDNISolone sodium succinate Injectable 40 milliGRAM(s) IV Push two times a day  metoprolol tartrate 25 milliGRAM(s) Oral three times a day  oseltamivir 30 milliGRAM(s) Oral <User Schedule>  pantoprazole    Tablet 40 milliGRAM(s) Oral before breakfast  senna 2 Tablet(s) Oral at bedtime  sevelamer carbonate 1600 milliGRAM(s) Oral three times a day with meals  sodium zirconium cyclosilicate 10 Gram(s) Oral once  tamsulosin 0.4 milliGRAM(s) Oral at bedtime    MEDICATIONS  (PRN):  acetaminophen     Tablet .. 650 milliGRAM(s) Oral every 6 hours PRN Temp greater or equal to 38C (100.4F), Mild Pain (1 - 3)  aluminum hydroxide/magnesium hydroxide/simethicone Suspension 30 milliLiter(s) Oral every 4 hours PRN Dyspepsia  benzonatate 100 milliGRAM(s) Oral every 8 hours PRN Cough  diltiazem Injectable 5 milliGRAM(s) IV Push every 6 hours PRN HR over 120 sustaining  HYDROmorphone  Injectable 1 milliGRAM(s) IV Push every 3 hours PRN Moderate Pain (4 - 6)  HYDROmorphone  Injectable 2 milliGRAM(s) IV Push every 3 hours PRN Severe Pain (7 - 10)  LORazepam   Injectable 0.2 milliGRAM(s) IV Push every 12 hours PRN for severe nausea and vomiting  melatonin 3 milliGRAM(s) Oral at bedtime PRN Insomnia  metoclopramide Injectable 10 milliGRAM(s) IV Push every 6 hours PRN nausea vomiting  ondansetron Injectable 4 milliGRAM(s) IV Push every 6 hours PRN Nausea and/or Vomiting      Allergies    penicillin (Other)    Intolerances          REVIEW OF SYSTEMS:      Vital Signs Last 24 Hrs  T(C): 36.7 (10 Bryan 2025 14:25), Max: 36.9 (10 Bryan 2025 05:27)  T(F): 98 (10 Bryan 2025 14:25), Max: 98.5 (10 Bryan 2025 05:27)  HR: 127 (10 Bryan 2025 14:) (121 - 128)  BP: 159/111 (10 Brayn 2025 14:25) (132/89 - 169/90)  BP(mean): --  RR: 18 (10 Bryan 2025 14:25) (18 - 20)  SpO2: 100% (10 Bryan 2025 14:25) (90% - 100%)    Parameters below as of 10 Bryan 2025 14:25  Patient On (Oxygen Delivery Method): nasal cannula  O2 Flow (L/min): 6    Daily     Daily Weight in k (10 Bryan 2025 14:25)  I&O's Detail    I&O's Summary      PHYSICAL EXAM:      HEAD:  Atraumatic, Normocephalic , no edema   NECK: Supple, No JVD,   NERVOUS SYSTEM:  Alert & Oriented X3,  CHEST/LUNG: EAE , No wheeze   HEART: tachy / regular   ABDOMEN: Soft, Nontender, Nondistended; Bowel sounds present  EXTREMITIES:  no sig edema . L access w/ thrill   LABS:                        9.5    7.53  )-----------( 60       ( 2025 07:27 )             30.9     01-10    142  |  102  |  125.5[H]  ----------------------------<  127[H]  5.7[H]   |  18.0[L]  |  11.28[H]    Ca    8.5      10 Bryan 2025 06:22  Phos  10.2     01-09  Mg     2.1     01-09    TPro  7.6  /  Alb  4.0  /  TBili  0.7  /  DBili  x   /  AST  22  /  ALT  11  /  AlkPhos  72  01-10      Urinalysis Basic - ( 10 Bryan 2025 06:22 )    Color: x / Appearance: x / SG: x / pH: x  Gluc: 127 mg/dL / Ketone: x  / Bili: x / Urobili: x   Blood: x / Protein: x / Nitrite: x   Leuk Esterase: x / RBC: x / WBC x   Sq Epi: x / Non Sq Epi: x / Bacteria: x        ABG - ( 2025 09:36 )  pH, Arterial: 7.250 pH, Blood: x     /  pCO2: 46    /  pO2: 93    / HCO3: 20    / Base Excess: -7.0  /  SaO2: 99.0                  RADIOLOGY & ADDITIONAL TESTS:

## 2025-01-10 NOTE — CHART NOTE - NSCHARTNOTEFT_GEN_A_CORE
Ir consulted for diagnostic paracentesis  patient is refusing the procedure at this time, recommend empiric treatment if SBP is clinically suspected  Please reconsult IR if patient become amendable for procedure.

## 2025-01-10 NOTE — PROGRESS NOTE ADULT - SUBJECTIVE AND OBJECTIVE BOX
Patient is a 64y old  Male who presents with a chief complaint of acute hypoxic respiratory failure 2/2 flu A     chart reviewed in am   pt is refusing to take po , refused HD yesterday and keep refusing this am   I had explained to him in details the risk of missing HD and worsening lytes death   remains tachycardic     ALLERGIES:  penicillin (Other)      MEDICATIONS  (STANDING):  aspirin enteric coated 81 milliGRAM(s) Oral daily  atorvastatin 40 milliGRAM(s) Oral at bedtime  azithromycin  IVPB      azithromycin  IVPB 500 milliGRAM(s) IV Intermittent every 24 hours  carvedilol 12.5 milliGRAM(s) Oral every 12 hours  cefTRIAXone Injectable.      cefTRIAXone Injectable. 2000 milliGRAM(s) IV Push every 24 hours  cloNIDine 0.1 milliGRAM(s) Oral two times a day  epoetin yolanda-epbx (RETACRIT) Injectable 50321 Unit(s) IV Push <User Schedule>  guaifenesin/dextromethorphan Oral Liquid 10 milliLiter(s) Oral every 4 hours  heparin   Injectable 5000 Unit(s) SubCutaneous every 12 hours  hydrALAZINE 50 milliGRAM(s) Oral two times a day  isosorbide   mononitrate ER Tablet (IMDUR) 60 milliGRAM(s) Oral daily  levalbuterol Inhalation 0.63 milliGRAM(s) Inhalation every 6 hours  levETIRAcetam   Injectable 500 milliGRAM(s) IV Push every 12 hours  methylPREDNISolone sodium succinate Injectable 40 milliGRAM(s) IV Push two times a day  metoprolol tartrate 25 milliGRAM(s) Oral two times a day  oseltamivir 30 milliGRAM(s) Oral <User Schedule>  pantoprazole    Tablet 40 milliGRAM(s) Oral before breakfast  senna 2 Tablet(s) Oral at bedtime  sevelamer carbonate 1600 milliGRAM(s) Oral three times a day with meals  sodium chloride 0.45%. 1000 milliLiter(s) (70 mL/Hr) IV Continuous <Continuous>  sodium zirconium cyclosilicate 10 Gram(s) Oral once  tamsulosin 0.4 milliGRAM(s) Oral at bedtime    T(C): 36.8 (10 Bryan 2025 11:17), Max: 36.9 (10 Bryan 2025 05:27)  T(F): 98.3 (10 Bryan 2025 11:17), Max: 98.5 (10 Bryan 2025 05:27)  HR: 127 (10 Bryan 2025 11:17) (121 - 127)  BP: 157/87 (10 Bryan 2025 11:17) (118/73 - 169/90)  BP(mean): --  RR: 18 (10 Bryan 2025 11:17) (18 - 20)  SpO2: 96% (10 Bryan 2025 11:17) (90% - 100%)    Parameters below as of 10 Bryan 2025 11:17  Patient On (Oxygen Delivery Method): nasal cannula  O2 Flow (L/min): 5    PHYSICAL EXAM:  General: resp distress mild to mod   Neck: supple   Lungs: bilateral rhonchi coarse BS both lungs   Cardio: RRR, S1/S2, No murmur  Abdomen: Soft, Nontender, Nondistended; Bowel sounds present  Extremities: No calf tenderness, No pitting edema  Skin  pale color                             9.5    7.53  )-----------( 60       ( 09 Jan 2025 07:27 )             30.9   01-10    142  |  102  |  125.5[H]  ----------------------------<  127[H]  5.7[H]   |  18.0[L]  |  11.28[H]    Ca    8.5      10 Bryan 2025 06:22  Phos  10.2     01-09  Mg     2.1     01-09    TPro  7.6  /  Alb  4.0  /  TBili  0.7  /  DBili  x   /  AST  22  /  ALT  11  /  AlkPhos  72  01-10    LABS:                        9.5    7.53  )-----------( x        ( 09 Jan 2025 07:27 )             30.9     01-09    145  |  105  |  100.8  ----------------------------<  86  5.9   |  18.0  |  9.89    Ca    8.3      09 Jan 2025 07:27  Phos  10.2     01-09  Mg     2.1     01-09    TPro  7.6  /  Alb  4.1  /  TBili  0.7  /  DBili  x   /  AST  20  /  ALT  11  /  AlkPhos  70  01-09      Lipase: 35 U/L (01-07-25 @ 03:00)      PT/INR - ( 07 Jan 2025 03:00 )   PT: 16.4 sec;   INR: 1.42 ratio         PTT - ( 07 Jan 2025 03:00 )  PTT:35.3 sec  Lactate, Blood: 1.3 mmol/L (01-08 @ 12:25)          01-08 Chol 105 mg/dL LDL -- HDL 58 mg/dL Trig 78 mg/dL    03:00 - VBG - pH: 7.410 | pCO2: 35    | pO2: 186   | Lactate: 1.00                 Urinalysis Basic - ( 09 Jan 2025 07:27 )    Color: x / Appearance: x / SG: x / pH: x  Gluc: 86 mg/dL / Ketone: x  / Bili: x / Urobili: x   Blood: x / Protein: x / Nitrite: x   Leuk Esterase: x / RBC: x / WBC x   Sq Epi: x / Non Sq Epi: x / Bacteria: x        Culture - Blood (collected 07 Jan 2025 17:00)  Source: .Blood BLOOD  Preliminary Report (09 Jan 2025 01:02):    No growth at 24 hours    Culture - Blood (collected 07 Jan 2025 16:55)  Source: .Blood BLOOD  Preliminary Report (09 Jan 2025 01:02):    No growth at 24 hours        RADIOLOGY & ADDITIONAL TESTS:  < from: US Abdomen Limited (01.08.25 @ 22:59) >  IMPRESSION:  Mild volume ascites.    Cholelithiasis. Gallbladder wall measures the upper limits of normal, 0.3   cm.    Right renal increased parenchymal echogenicity, compatible with medical   renal disease.        < end of copied text >  < from: US Duplex Venous Lower Ext Complete, Bilateral (01.08.25 @ 22:54) >  IMPRESSION:  No evidence of deep venous thrombosis in either lower extremity.      -          < end of copied text >  < from: CT Angio Chest Aorta w/wo IV Cont (01.07.25 @ 05:53) >  IMPRESSION:  Known aortic dissection from the aortic arch extending to the right   external iliac artery, right brachiocephalic and left subclavian arteries   and bilateral renal arteries. No interval progression.    Redemonstration of 1.5 cm sized enhancing right renal lesion, concerning   for neoplasm.  Wedge-shaped hypodensity in the right kidney-the possibility of infection   cannot be excluded.    Moderate ascites, new since prior.    Nodular infiltration in the left lower lobe, concerning for pneumonia.          < end of copied text >

## 2025-01-10 NOTE — CONSULT NOTE ADULT - NS ATTEND AMEND GEN_ALL_CORE FT
examined at bed side, VS stable, tele atrial tachycardia in setting of flu, agree with metoprolol, may consider Cardizem if unresponsive, or EP opinion.  Cont rest of meds.   Call back as needed.
I evaluated this pt. with my ACP and agree with the above assessment and management plan. Pt with abdominal ascites with normal appearing liver likely 2/2 fluid overload state from ESRD. Pt. is on HD Tuesday, Thursday and Saturday and his last HD session was Saturday which was a full session. He now presents with acute influenza infection with fever and cough and abdominal pain. He should have a diagnostic paracentesis in the ED to r/o possible SBP. His fever is likely 2/2 acute influenza infection. I reviewed his CT images and agree with the radiologist's interpretation.

## 2025-01-10 NOTE — PROGRESS NOTE ADULT - ASSESSMENT
65 y/o M w/ PMH of Afib (s/p watchman and no longer on AC), ESRD (on HD T/Th/Sa; LUE AFV), chronic anemia, chronic thrombocytopenia, rectal bleeding, CAD s/p CABG, aortic & mitral valve replacement, Type A aortic dissection s/p repair complicated by ischemic bowel s/p resection, seizure disorder on Keppra, IE of MV with MSSA and s/p course of Ancef with f/u MUSTAPHA negative for vegetation in 23', presents to the ED for 2 days of sob, nasal congestion, nonproductive cough w/ associated N/V and abd pain.  Pain is periumbilical and emesis is NBNB.  Vitals notable for sinus tachy in 120's, tachypnea and O2 sat 80% ORA, now on 4L NC and sat'ing 95%.  Clinically toxic appearing w/ clear nasal discharge, poor inspiratory effort, scattered rales, and benign abd exam.  Initial w/u significant for trop 70, BNP>33K, AGMA 20, HCO3 20, BUN 72, Cr 7.95, lipase 7 and lactate 1.0, no acute ischemic changes on EKG. UA w/out pyuria.  CTA c/a/p reporting new moderate ascites, possible right pyelo and LLL infiltrate.  Received 1L IVNS, bentyl 10mgx1, reglan 10mg x1, morphine 4mg x3, zofran 4mg x2, sucralfate 1g x1 in ED.  Will admit for acute hypoxic respiratory failure and sepsis 2/2 Flu A    1-Acute hypoxic respiratory failure 2/2 viral PNA  Sepsis 2/2 Flu A   - Sinus tachy, tachypnea ABG ordered encourage to keep oxygen on   - cont tamiflu for influenza infection   add azithro for bronchitis   - Supportive care with cough suppressant and expectorant   cont tele     2- S tachycardia -h/o Afib  - s/p watchman and no longer on AC  -change to metoprolol on coreg po   iv cardizem prn   cardiology evaluation   TTE done in dev 4 EF 50-55 %     3--Abdominal pain, N/V possible multifactorial   improved   - c/w anti-emetics and analgesics prn , protonix     4- Ascites   called IR for paracentesis   r/o SBP if pt agrees     4-Type II MI likely demand 2/2 above vs poor renal clearance   - Trop 70 and BNP >33K denies CP   - EKG w/ no acute ischemic changes    5-AMGA likely 2/2 uremia and sepsis  - Serum HCO3 mildly low and Lactate normal   - Repeat CMP tomorrow to reassess     6-ESRD with hyperkalemia   refused HD yesterday will try again  keep refusing today to   I spoke to his brother yesterday re pt's refusal and condition   On HD t/th/sa and due for HD today      7-Chronic normocytic anemia   Chronic thrombocytopenia  - H/H currently better than baseline (ranges 7-8.5)    8-CAD s/p CABG    9-HTN / HLD  - cont asa  - c/w statin therapy   controlled cont home meds     10--Seizure disorder   - c/w keppra IV    11-BPH  - c/w flomax     12-Incidental CT finding   - Readministration of 1.5 cm sized enhancing right renal lesion, concerning for neoplasm  - Refer to heme/onc on discharge     13-Aortic dissection  - h/o known aortic dissection, appears stable on CT  - out pt f/u    VTE ppx:  heparin sq bid     Dispo: Acute improvement in resp status and electrolytes

## 2025-01-10 NOTE — PROCEDURE NOTE - NSPROCDETAILS_GEN_ALL_CORE
location identified, draped/prepped, sterile technique used/blood seen on insertion/dressing applied/flushes easily
blood seen on insertion/dressing applied/flushes easily/secured in place

## 2025-01-10 NOTE — CONSULT NOTE ADULT - REASON FOR ADMISSION
acute hypoxic respiratory failure 2/2 flu A

## 2025-01-10 NOTE — PROGRESS NOTE ADULT - ASSESSMENT
63 y/o M w/ PMH of Afib (s/p watchman and no longer on AC), ESRD (on HD T/Th/Sa; LUE AFV), chronic anemia, chronic thrombocytopenia, rectal bleeding, CAD s/p CABG, aortic & mitral valve replacement, Type A aortic dissection s/p repair complicated by ischemic bowel s/p resection, seizure disorder on Keppra, IE of MV with MSSA and s/p course of Ancef with f/u MUSTAPHA negative for vegetation in 23', presents to the ED for 2 days of sob, nasal congestion, nonproductive cough w/ associated N/V and abd pain. Found to be FluA positive and persistently tachycardic.    ESRD - HD [facility]   Intermittently refuses HD   Agreeable to HD today   Gets Benadryl with HD   HR limiting effectual UF with HD     Anemia - Epogen with HD     ST --> (+) Flu   Cardio following with regards to rate control   IV Cardizem as needed   Was refusing to take oral medications   Renal dose Tamiflu and Zmax     Mild ascites on durga 1/8 --> Pt also refused paracentesis     RO - Renvela resumed with meals

## 2025-01-11 LAB
ANION GAP SERPL CALC-SCNC: 23 MMOL/L — HIGH (ref 5–17)
BASE EXCESS BLDA CALC-SCNC: 0.3 MMOL/L — SIGNIFICANT CHANGE UP (ref -2–3)
BUN SERPL-MCNC: 81.3 MG/DL — HIGH (ref 8–20)
CALCIUM SERPL-MCNC: 8.2 MG/DL — LOW (ref 8.4–10.5)
CHLORIDE SERPL-SCNC: 96 MMOL/L — SIGNIFICANT CHANGE UP (ref 96–108)
CO2 SERPL-SCNC: 17 MMOL/L — LOW (ref 22–29)
CREAT SERPL-MCNC: 7.57 MG/DL — HIGH (ref 0.5–1.3)
EGFR: 7 ML/MIN/1.73M2 — LOW
GLUCOSE SERPL-MCNC: 89 MG/DL — SIGNIFICANT CHANGE UP (ref 70–99)
HCO3 BLDA-SCNC: 25 MMOL/L — SIGNIFICANT CHANGE UP (ref 21–28)
HCT VFR BLD CALC: 29.2 % — LOW (ref 39–50)
HGB BLD-MCNC: 8.9 G/DL — LOW (ref 13–17)
HOROWITZ INDEX BLDA+IHG-RTO: SIGNIFICANT CHANGE UP
MCHC RBC-ENTMCNC: 30.2 PG — SIGNIFICANT CHANGE UP (ref 27–34)
MCHC RBC-ENTMCNC: 30.5 G/DL — LOW (ref 32–36)
MCV RBC AUTO: 99 FL — SIGNIFICANT CHANGE UP (ref 80–100)
PCO2 BLDA: 43 MMHG — SIGNIFICANT CHANGE UP (ref 35–48)
PH BLDA: 7.38 — SIGNIFICANT CHANGE UP (ref 7.35–7.45)
PHOSPHATE SERPL-MCNC: 8.3 MG/DL — HIGH (ref 2.4–4.7)
PLATELET # BLD AUTO: 61 K/UL — LOW (ref 150–400)
PO2 BLDA: 63 MMHG — LOW (ref 83–108)
POTASSIUM SERPL-MCNC: 5 MMOL/L — SIGNIFICANT CHANGE UP (ref 3.5–5.3)
POTASSIUM SERPL-SCNC: 5 MMOL/L — SIGNIFICANT CHANGE UP (ref 3.5–5.3)
RBC # BLD: 2.95 M/UL — LOW (ref 4.2–5.8)
RBC # FLD: 16.8 % — HIGH (ref 10.3–14.5)
SAO2 % BLDA: 92.2 % — LOW (ref 94–98)
SODIUM SERPL-SCNC: 136 MMOL/L — SIGNIFICANT CHANGE UP (ref 135–145)
WBC # BLD: 6.87 K/UL — SIGNIFICANT CHANGE UP (ref 3.8–10.5)
WBC # FLD AUTO: 6.87 K/UL — SIGNIFICANT CHANGE UP (ref 3.8–10.5)

## 2025-01-11 PROCEDURE — 99233 SBSQ HOSP IP/OBS HIGH 50: CPT

## 2025-01-11 RX ORDER — LORAZEPAM 1 MG/1
1 TABLET ORAL ONCE
Refills: 0 | Status: DISCONTINUED | OUTPATIENT
Start: 2025-01-11 | End: 2025-01-14

## 2025-01-11 RX ORDER — DILTIAZEM HYDROCHLORIDE 300 MG/1
10 CAPSULE, COATED, EXTENDED RELEASE ORAL EVERY 6 HOURS
Refills: 0 | Status: DISCONTINUED | OUTPATIENT
Start: 2025-01-11 | End: 2025-01-14

## 2025-01-11 RX ADMIN — LEVETIRACETAM 500 MILLIGRAM(S): 100 SOLUTION ORAL at 17:55

## 2025-01-11 RX ADMIN — ATORVASTATIN CALCIUM 40 MILLIGRAM(S): 40 TABLET, FILM COATED ORAL at 22:50

## 2025-01-11 RX ADMIN — TAMSULOSIN HYDROCHLORIDE 0.4 MILLIGRAM(S): 0.4 CAPSULE ORAL at 22:50

## 2025-01-11 RX ADMIN — DILTIAZEM HYDROCHLORIDE 10 MILLIGRAM(S): 300 CAPSULE, COATED, EXTENDED RELEASE ORAL at 14:33

## 2025-01-11 RX ADMIN — Medication 25 MILLIGRAM(S): at 06:09

## 2025-01-11 RX ADMIN — DILTIAZEM HYDROCHLORIDE 5 MILLIGRAM(S): 300 CAPSULE, COATED, EXTENDED RELEASE ORAL at 00:38

## 2025-01-11 RX ADMIN — Medication 25 MILLIGRAM(S): at 22:50

## 2025-01-11 RX ADMIN — HYDRALAZINE HYDROCHLORIDE 50 MILLIGRAM(S): 10 TABLET ORAL at 17:56

## 2025-01-11 RX ADMIN — Medication 2 MILLIGRAM(S): at 06:07

## 2025-01-11 RX ADMIN — Medication 60 MILLIGRAM(S): at 13:29

## 2025-01-11 RX ADMIN — HEPARIN SODIUM 5000 UNIT(S): 1000 INJECTION, SOLUTION INTRAVENOUS; SUBCUTANEOUS at 06:08

## 2025-01-11 RX ADMIN — Medication 2 MILLIGRAM(S): at 01:59

## 2025-01-11 RX ADMIN — PANTOPRAZOLE 40 MILLIGRAM(S): 40 TABLET, DELAYED RELEASE ORAL at 06:10

## 2025-01-11 RX ADMIN — Medication 25 MILLIGRAM(S): at 13:29

## 2025-01-11 RX ADMIN — LEVALBUTEROL 0.63 MILLIGRAM(S): 1.25 SOLUTION RESPIRATORY (INHALATION) at 08:23

## 2025-01-11 RX ADMIN — Medication 81 MILLIGRAM(S): at 13:30

## 2025-01-11 RX ADMIN — Medication 2 MILLIGRAM(S): at 00:59

## 2025-01-11 RX ADMIN — LEVALBUTEROL 0.63 MILLIGRAM(S): 1.25 SOLUTION RESPIRATORY (INHALATION) at 02:47

## 2025-01-11 RX ADMIN — METHYLPREDNISOLONE 40 MILLIGRAM(S): 4 TABLET ORAL at 17:55

## 2025-01-11 RX ADMIN — LEVETIRACETAM 500 MILLIGRAM(S): 100 SOLUTION ORAL at 06:08

## 2025-01-11 RX ADMIN — METHYLPREDNISOLONE 40 MILLIGRAM(S): 4 TABLET ORAL at 06:10

## 2025-01-11 RX ADMIN — DILTIAZEM HYDROCHLORIDE 5 MILLIGRAM(S): 300 CAPSULE, COATED, EXTENDED RELEASE ORAL at 08:52

## 2025-01-11 RX ADMIN — HEPARIN SODIUM 5000 UNIT(S): 1000 INJECTION, SOLUTION INTRAVENOUS; SUBCUTANEOUS at 17:55

## 2025-01-11 RX ADMIN — SEVELAMER CARBONATE 1600 MILLIGRAM(S): 800 TABLET, FILM COATED ORAL at 17:55

## 2025-01-11 RX ADMIN — CLONIDINE HYDROCHLORIDE 0.1 MILLIGRAM(S): 0.2 TABLET ORAL at 06:09

## 2025-01-11 RX ADMIN — AZITHROMYCIN MONOHYDRATE 255 MILLIGRAM(S): 200 POWDER, FOR SUSPENSION ORAL at 08:51

## 2025-01-11 RX ADMIN — CLONIDINE HYDROCHLORIDE 0.1 MILLIGRAM(S): 0.2 TABLET ORAL at 17:55

## 2025-01-11 RX ADMIN — OSELTAMIVIR 30 MILLIGRAM(S): 75 CAPSULE ORAL at 13:29

## 2025-01-11 RX ADMIN — HYDRALAZINE HYDROCHLORIDE 50 MILLIGRAM(S): 10 TABLET ORAL at 06:09

## 2025-01-11 NOTE — PROGRESS NOTE ADULT - SUBJECTIVE AND OBJECTIVE BOX
[facility] Division of Hospital Medicine    INTERVAL HISTORY:  Overnight, no acute events.     Patient seen and examined at bedside this morning. Arousable but very lethargic. Does not want to go to HD but will not state reason. Unable to engage in ROS.     MEDICATIONS  (STANDING):  aspirin enteric coated 81 milliGRAM(s) Oral daily  atorvastatin 40 milliGRAM(s) Oral at bedtime  azithromycin  IVPB 500 milliGRAM(s) IV Intermittent every 24 hours  azithromycin  IVPB      cloNIDine 0.1 milliGRAM(s) Oral two times a day  epoetin yolanda-epbx (RETACRIT) Injectable 88522 Unit(s) IV Push <User Schedule>  guaifenesin/dextromethorphan Oral Liquid 10 milliLiter(s) Oral every 4 hours  heparin   Injectable 5000 Unit(s) SubCutaneous every 12 hours  hydrALAZINE 50 milliGRAM(s) Oral two times a day  isosorbide   mononitrate ER Tablet (IMDUR) 60 milliGRAM(s) Oral daily  levalbuterol Inhalation 0.63 milliGRAM(s) Inhalation every 6 hours  levETIRAcetam   Injectable 500 milliGRAM(s) IV Push every 12 hours  LORazepam   Injectable 1 milliGRAM(s) IV Push once  methylPREDNISolone sodium succinate Injectable 40 milliGRAM(s) IV Push two times a day  metoprolol tartrate 25 milliGRAM(s) Oral three times a day  pantoprazole    Tablet 40 milliGRAM(s) Oral before breakfast  senna 2 Tablet(s) Oral at bedtime  sevelamer carbonate 1600 milliGRAM(s) Oral three times a day with meals  sodium zirconium cyclosilicate 10 Gram(s) Oral once  tamsulosin 0.4 milliGRAM(s) Oral at bedtime    MEDICATIONS  (PRN):  acetaminophen     Tablet .. 650 milliGRAM(s) Oral every 6 hours PRN Temp greater or equal to 38C (100.4F), Mild Pain (1 - 3)  aluminum hydroxide/magnesium hydroxide/simethicone Suspension 30 milliLiter(s) Oral every 4 hours PRN Dyspepsia  benzonatate 100 milliGRAM(s) Oral every 8 hours PRN Cough  diltiazem Injectable 10 milliGRAM(s) IV Push every 6 hours PRN HR >120s  LORazepam   Injectable 0.2 milliGRAM(s) IV Push every 12 hours PRN for severe nausea and vomiting  melatonin 3 milliGRAM(s) Oral at bedtime PRN Insomnia  metoclopramide Injectable 10 milliGRAM(s) IV Push every 6 hours PRN nausea vomiting  ondansetron Injectable 4 milliGRAM(s) IV Push every 6 hours PRN Nausea and/or Vomiting        I&O's Summary    10 Bryan 2025 07:01  -  11 Jan 2025 07:00  --------------------------------------------------------  IN: 0 mL / OUT: 500 mL / NET: -500 mL        PHYSICAL EXAM:  Vital Signs Last 24 Hrs  T(C): 36.8 (11 Jan 2025 10:07), Max: 36.8 (11 Jan 2025 10:07)  T(F): 98.2 (11 Jan 2025 10:07), Max: 98.2 (11 Jan 2025 10:07)  HR: 130 (11 Jan 2025 10:07) (120 - 130)  BP: 153/86 (11 Jan 2025 10:07) (124/78 - 153/86)  BP(mean): --  RR: 18 (11 Jan 2025 10:07) (18 - 18)  SpO2: 98% (11 Jan 2025 10:07) (94% - 100%)    Parameters below as of 11 Jan 2025 10:07  Patient On (Oxygen Delivery Method): room air      CONSTITUTIONAL: lethargic but arousable   HEENT: Normocephalic, Atraumatic  RESPIRATORY: lungs are clear to auscultation bilaterally, No crackles, rhonchi, wheezes  CARDIOVASCULAR: Regular rate and rhythm, No lower extremity edema  ABDOMEN: Soft, non-distended, nontender to palpation, +BS  MUSCLOSKELETAL: moving all 4 extremities spontaneously  NEUROLOGY: Alert, Oriented x0-1    LABS:                        8.9    6.87  )-----------( 61       ( 11 Jan 2025 07:01 )             29.2     01-11    136  |  96  |  81.3[H]  ----------------------------<  89  5.0   |  17.0[L]  |  7.57[H]    Ca    8.2[L]      11 Jan 2025 07:01  Phos  8.3     01-11    TPro  7.6  /  Alb  4.0  /  TBili  0.7  /  DBili  x   /  AST  22  /  ALT  11  /  AlkPhos  72  01-10          Urinalysis Basic - ( 11 Jan 2025 07:01 )    Color: x / Appearance: x / SG: x / pH: x  Gluc: 89 mg/dL / Ketone: x  / Bili: x / Urobili: x   Blood: x / Protein: x / Nitrite: x   Leuk Esterase: x / RBC: x / WBC x   Sq Epi: x / Non Sq Epi: x / Bacteria: x        CAPILLARY BLOOD GLUCOSE            RADIOLOGY & ADDITIONAL TESTS:  Results Reviewed

## 2025-01-11 NOTE — PROGRESS NOTE ADULT - SUBJECTIVE AND OBJECTIVE BOX
NEPHROLOGY INTERVAL HPI/OVERNIGHT EVENTS:    Seen earlier   Keeps taking off monitor   Feels better post HD 1/10  No SOB or CP   HR still not ideal     MEDICATIONS  (STANDING):  aspirin enteric coated 81 milliGRAM(s) Oral daily  atorvastatin 40 milliGRAM(s) Oral at bedtime  azithromycin  IVPB      azithromycin  IVPB 500 milliGRAM(s) IV Intermittent every 24 hours  cloNIDine 0.1 milliGRAM(s) Oral two times a day  epoetin yolanda-epbx (RETACRIT) Injectable 27341 Unit(s) IV Push <User Schedule>  guaifenesin/dextromethorphan Oral Liquid 10 milliLiter(s) Oral every 4 hours  heparin   Injectable 5000 Unit(s) SubCutaneous every 12 hours  hydrALAZINE 50 milliGRAM(s) Oral two times a day  isosorbide   mononitrate ER Tablet (IMDUR) 60 milliGRAM(s) Oral daily  levalbuterol Inhalation 0.63 milliGRAM(s) Inhalation every 6 hours  levETIRAcetam   Injectable 500 milliGRAM(s) IV Push every 12 hours  methylPREDNISolone sodium succinate Injectable 40 milliGRAM(s) IV Push two times a day  metoprolol tartrate 25 milliGRAM(s) Oral three times a day  oseltamivir 30 milliGRAM(s) Oral <User Schedule>  pantoprazole    Tablet 40 milliGRAM(s) Oral before breakfast  senna 2 Tablet(s) Oral at bedtime  sevelamer carbonate 1600 milliGRAM(s) Oral three times a day with meals  sodium zirconium cyclosilicate 10 Gram(s) Oral once  tamsulosin 0.4 milliGRAM(s) Oral at bedtime    MEDICATIONS  (PRN):  acetaminophen     Tablet .. 650 milliGRAM(s) Oral every 6 hours PRN Temp greater or equal to 38C (100.4F), Mild Pain (1 - 3)  aluminum hydroxide/magnesium hydroxide/simethicone Suspension 30 milliLiter(s) Oral every 4 hours PRN Dyspepsia  benzonatate 100 milliGRAM(s) Oral every 8 hours PRN Cough  diltiazem Injectable 5 milliGRAM(s) IV Push every 6 hours PRN HR over 120 sustaining  HYDROmorphone  Injectable 1 milliGRAM(s) IV Push every 3 hours PRN Moderate Pain (4 - 6)  HYDROmorphone  Injectable 2 milliGRAM(s) IV Push every 3 hours PRN Severe Pain (7 - 10)  LORazepam   Injectable 0.2 milliGRAM(s) IV Push every 12 hours PRN for severe nausea and vomiting  melatonin 3 milliGRAM(s) Oral at bedtime PRN Insomnia  metoclopramide Injectable 10 milliGRAM(s) IV Push every 6 hours PRN nausea vomiting  ondansetron Injectable 4 milliGRAM(s) IV Push every 6 hours PRN Nausea and/or Vomiting      Allergies    penicillin (Other)    Intolerances              Vital Signs Last 24 Hrs  T(C): 36.4 (2025 04:40), Max: 36.8 (10 Bryan 2025 11:17)  T(F): 97.6 (2025 04:40), Max: 98.3 (10 Bryan 2025 11:)  HR: 130 (2025 04:40) (120 - 130)  BP: 147/90 (2025 04:40) (124/78 - 167/98)  BP(mean): --  RR: 18 (2025 04:40) (18 - 18)  SpO2: 99% (2025 08:24) (94% - 100%)    Parameters below as of 2025 08:24  Patient On (Oxygen Delivery Method): nasal cannula, 3 lpm      Daily     Daily Weight in k.5 (10 Bryan 2025 17:45)  I&O's Detail    10 Bryan 2025 07:01  -  2025 07:00  --------------------------------------------------------  IN:  Total IN: 0 mL    OUT:    Other (mL): 500 mL  Total OUT: 500 mL    Total NET: -500 mL        I&O's Summary    10 Bryan 2025 07:01  -  2025 07:00  --------------------------------------------------------  IN: 0 mL / OUT: 500 mL / NET: -500 mL        PHYSICAL EXAM:    HEAD:  Atraumatic, Normocephalic , no edema   NECK: Supple, No JVD,   NERVOUS SYSTEM:  Alert & Oriented X3,  CHEST/LUNG: EAE , No wheeze   HEART: tachy / regular   ABDOMEN: Soft, Nontender, Nondistended; Bowel sounds present  EXTREMITIES:  no sig edema . L access w/ thrill   LABS:                        8.9    6.87  )-----------( 61       ( 2025 07:01 )             29.2         136  |  96  |  81.3[H]  ----------------------------<  89  5.0   |  17.0[L]  |  7.57[H]    Ca    8.2[L]      2025 07:01  Phos  8.3         TPro  7.6  /  Alb  4.0  /  TBili  0.7  /  DBili  x   /  AST  22  /  ALT  11  /  AlkPhos  72  01-10      Urinalysis Basic - ( 2025 07:01 )    Color: x / Appearance: x / SG: x / pH: x  Gluc: 89 mg/dL / Ketone: x  / Bili: x / Urobili: x   Blood: x / Protein: x / Nitrite: x   Leuk Esterase: x / RBC: x / WBC x   Sq Epi: x / Non Sq Epi: x / Bacteria: x      Phosphorus: 8.3 mg/dL ( @ 07:01)    ABG - ( 2025 09:36 )  pH, Arterial: 7.250 pH, Blood: x     /  pCO2: 46    /  pO2: 93    / HCO3: 20    / Base Excess: -7.0  /  SaO2: 99.0                  RADIOLOGY & ADDITIONAL TESTS:

## 2025-01-11 NOTE — PROGRESS NOTE ADULT - ASSESSMENT
65 y/o M w/ PMH of Afib (s/p watchman and no longer on AC), ESRD (on HD T/Th/Sa; LUE AFV), chronic anemia, chronic thrombocytopenia, rectal bleeding, CAD s/p CABG, aortic & mitral valve replacement, Type A aortic dissection s/p repair complicated by ischemic bowel s/p resection, seizure disorder on Keppra, IE of MV with MSSA and s/p course of Ancef with f/u MUSTAPHA negative for vegetation in 23', presents to the ED for 2 days of sob, nasal congestion, nonproductive cough w/ associated N/V and abd pain.  Pain is periumbilical and emesis is NBNB.  Vitals notable for sinus tachy in 120's, tachypnea and O2 sat 80% ORA, now on 4L NC and sat'ing 95%.  Clinically toxic appearing w/ clear nasal discharge, poor inspiratory effort, scattered rales, and benign abd exam.  Initial w/u significant for trop 70, BNP>33K, AGMA 20, HCO3 20, BUN 72, Cr 7.95, lipase 7 and lactate 1.0, no acute ischemic changes on EKG. UA w/out pyuria.  CTA c/a/p reporting new moderate ascites, possible right pyelo and LLL infiltrate.  Received 1L IVNS, bentyl 10mgx1, reglan 10mg x1, morphine 4mg x3, zofran 4mg x2, sucralfate 1g x1 in ED.  Will admit for acute hypoxic respiratory failure and sepsis 2/2 Flu A    #AMS  - appears lethargic, not engaging in conversation; appears to be a change since yesterday's exam  - will get ABG and CTH stat  - aspiration precautions  - will discontinue dilaudid arnold since pt is refusing HD     #Acute hypoxic respiratory failure 2/2 viral PNA  #Sepsis 2/2 Flu A   - Sinus tachy, tachypnea ABG ordered encourage to keep oxygen on   - cont tamiflu for influenza infection   - c/w azithro for bronchitis,  Qtc 478  - Supportive care with cough suppressant and expectorant   cont tele     #sinus tachycardia -h/o Afib  - s/p watchman and no longer on AC  - change to metoprolol  - iv cardizem prn   - cardiology evaluation   - TTE done in dev 4 EF 50-55 %     #Abdominal pain, N/V possible multifactorial    - c/w anti-emetics and analgesics prn , protonix     #Ascites   - called IR for paracentesis, pt refused     #Type II MI likely demand 2/2 above vs poor renal clearance   - Trop 70 and BNP >33K denies CP   - EKG w/ no acute ischemic changes    #AMGA likely 2/2 uremia and sepsis  - Serum HCO3 mildly low and Lactate normal   - refusing HD, will not place on fluids as pt refusing HD and will not place on bicarb tabs as pt refusing po meds at this time     #ESRD with hyperkalemia   - HD yesterday 1/10, refused this am     #Chronic normocytic anemia   #Chronic thrombocytopenia  - H/H currently better than baseline (ranges 7-8.5)    #CAD s/p CABG  #HTN / HLD  - cont asa  - c/w statin therapy   - controlled cont home meds     #Seizure disorder   - c/w keppra IV    #BPH  - c/w flomax     #Incidental CT finding   - Readministration of 1.5 cm sized enhancing right renal lesion, concerning for neoplasm  - Refer to heme/onc on discharge     #Aortic dissection  - h/o known aortic dissection, appears stable on CT  - out pt f/u    VTE ppx:  heparin sq bid     Dispo: Acute improvement in resp status and electrolytes

## 2025-01-11 NOTE — PROGRESS NOTE ADULT - ASSESSMENT
63 y/o M w/ PMH of Afib (s/p watchman and no longer on AC), ESRD (on HD T/Th/Sa; [facility]), chronic anemia, chronic thrombocytopenia, rectal bleeding, CAD s/p CABG, aortic & mitral valve replacement, Type A aortic dissection s/p repair complicated by ischemic bowel s/p resection, seizure disorder on Keppra, IE of MV with MSSA and s/p course of Ancef with f/u MUSTAPHA negative for vegetation in 23', presents to the ED for 2 days of sob, nasal congestion, nonproductive cough w/ associated N/V and abd pain. Found to be FluA positive and persistently tachycardic.    ESRD - HD [facility] T[facility]   Intermittently refuses HD   Gets Benadryl with HD   HR limiting effectual UF with HD   Last HD 1/10  I was setting up additional HD today to get back on schedule , but he refused     Anemia - Epogen with HD     ST --> (+) Flu   Cardio following with regards to rate control   IV Cardizem as needed   Was refusing to take oral medications   Renal dose Tamiflu and Zmax     Mild ascites on durga 1/8 --> Pt also refused paracentesis     RO - Renvela resumed with meals

## 2025-01-12 LAB
ANION GAP SERPL CALC-SCNC: 19 MMOL/L — HIGH (ref 5–17)
BUN SERPL-MCNC: 105.4 MG/DL — HIGH (ref 8–20)
CALCIUM SERPL-MCNC: 7.9 MG/DL — LOW (ref 8.4–10.5)
CHLORIDE SERPL-SCNC: 96 MMOL/L — SIGNIFICANT CHANGE UP (ref 96–108)
CO2 SERPL-SCNC: 22 MMOL/L — SIGNIFICANT CHANGE UP (ref 22–29)
CREAT SERPL-MCNC: 8.65 MG/DL — HIGH (ref 0.5–1.3)
EGFR: 6 ML/MIN/1.73M2 — LOW
GLUCOSE SERPL-MCNC: 129 MG/DL — HIGH (ref 70–99)
HCT VFR BLD CALC: 26.4 % — LOW (ref 39–50)
HGB BLD-MCNC: 8.5 G/DL — LOW (ref 13–17)
MAGNESIUM SERPL-MCNC: 2.4 MG/DL — SIGNIFICANT CHANGE UP (ref 1.6–2.6)
MCHC RBC-ENTMCNC: 30.4 PG — SIGNIFICANT CHANGE UP (ref 27–34)
MCHC RBC-ENTMCNC: 32.2 G/DL — SIGNIFICANT CHANGE UP (ref 32–36)
MCV RBC AUTO: 94.3 FL — SIGNIFICANT CHANGE UP (ref 80–100)
PHOSPHATE SERPL-MCNC: 8 MG/DL — HIGH (ref 2.4–4.7)
PLATELET # BLD AUTO: 63 K/UL — LOW (ref 150–400)
POTASSIUM SERPL-MCNC: 4.8 MMOL/L — SIGNIFICANT CHANGE UP (ref 3.5–5.3)
POTASSIUM SERPL-SCNC: 4.8 MMOL/L — SIGNIFICANT CHANGE UP (ref 3.5–5.3)
RBC # BLD: 2.8 M/UL — LOW (ref 4.2–5.8)
RBC # FLD: 16.4 % — HIGH (ref 10.3–14.5)
SODIUM SERPL-SCNC: 137 MMOL/L — SIGNIFICANT CHANGE UP (ref 135–145)
WBC # BLD: 3.48 K/UL — LOW (ref 3.8–10.5)
WBC # FLD AUTO: 3.48 K/UL — LOW (ref 3.8–10.5)

## 2025-01-12 PROCEDURE — 99233 SBSQ HOSP IP/OBS HIGH 50: CPT

## 2025-01-12 RX ORDER — METOPROLOL TARTRATE 50 MG
50 TABLET ORAL THREE TIMES A DAY
Refills: 0 | Status: DISCONTINUED | OUTPATIENT
Start: 2025-01-12 | End: 2025-01-14

## 2025-01-12 RX ORDER — HYDROMORPHONE HCL 4 MG
0.5 TABLET ORAL ONCE
Refills: 0 | Status: DISCONTINUED | OUTPATIENT
Start: 2025-01-12 | End: 2025-01-12

## 2025-01-12 RX ORDER — EPOETIN ALFA 2000 [IU]/ML
10000 SOLUTION INTRAVENOUS; SUBCUTANEOUS
Refills: 0 | Status: DISCONTINUED | OUTPATIENT
Start: 2025-01-13 | End: 2025-01-14

## 2025-01-12 RX ADMIN — METHYLPREDNISOLONE 40 MILLIGRAM(S): 4 TABLET ORAL at 17:15

## 2025-01-12 RX ADMIN — Medication 81 MILLIGRAM(S): at 12:59

## 2025-01-12 RX ADMIN — CLONIDINE HYDROCHLORIDE 0.1 MILLIGRAM(S): 0.2 TABLET ORAL at 06:02

## 2025-01-12 RX ADMIN — Medication 25 MILLIGRAM(S): at 05:58

## 2025-01-12 RX ADMIN — AZITHROMYCIN MONOHYDRATE 255 MILLIGRAM(S): 200 POWDER, FOR SUSPENSION ORAL at 09:16

## 2025-01-12 RX ADMIN — Medication 50 MILLIGRAM(S): at 21:25

## 2025-01-12 RX ADMIN — Medication 0.5 MILLIGRAM(S): at 21:23

## 2025-01-12 RX ADMIN — HEPARIN SODIUM 5000 UNIT(S): 1000 INJECTION, SOLUTION INTRAVENOUS; SUBCUTANEOUS at 17:16

## 2025-01-12 RX ADMIN — ONDANSETRON 4 MILLIGRAM(S): 4 TABLET ORAL at 17:15

## 2025-01-12 RX ADMIN — DILTIAZEM HYDROCHLORIDE 10 MILLIGRAM(S): 300 CAPSULE, COATED, EXTENDED RELEASE ORAL at 00:34

## 2025-01-12 RX ADMIN — Medication 50 MILLIGRAM(S): at 15:21

## 2025-01-12 RX ADMIN — CLONIDINE HYDROCHLORIDE 0.1 MILLIGRAM(S): 0.2 TABLET ORAL at 17:16

## 2025-01-12 RX ADMIN — Medication 60 MILLIGRAM(S): at 12:57

## 2025-01-12 RX ADMIN — Medication 0.5 MILLIGRAM(S): at 22:22

## 2025-01-12 RX ADMIN — METHYLPREDNISOLONE 40 MILLIGRAM(S): 4 TABLET ORAL at 05:57

## 2025-01-12 RX ADMIN — LEVETIRACETAM 500 MILLIGRAM(S): 100 SOLUTION ORAL at 17:17

## 2025-01-12 RX ADMIN — HYDRALAZINE HYDROCHLORIDE 50 MILLIGRAM(S): 10 TABLET ORAL at 05:58

## 2025-01-12 RX ADMIN — Medication 30 MILLILITER(S): at 17:26

## 2025-01-12 RX ADMIN — ATORVASTATIN CALCIUM 40 MILLIGRAM(S): 40 TABLET, FILM COATED ORAL at 21:27

## 2025-01-12 RX ADMIN — LEVETIRACETAM 500 MILLIGRAM(S): 100 SOLUTION ORAL at 05:58

## 2025-01-12 RX ADMIN — SEVELAMER CARBONATE 1600 MILLIGRAM(S): 800 TABLET, FILM COATED ORAL at 09:16

## 2025-01-12 RX ADMIN — HEPARIN SODIUM 5000 UNIT(S): 1000 INJECTION, SOLUTION INTRAVENOUS; SUBCUTANEOUS at 05:58

## 2025-01-12 RX ADMIN — TAMSULOSIN HYDROCHLORIDE 0.4 MILLIGRAM(S): 0.4 CAPSULE ORAL at 21:26

## 2025-01-12 RX ADMIN — PANTOPRAZOLE 40 MILLIGRAM(S): 40 TABLET, DELAYED RELEASE ORAL at 06:02

## 2025-01-12 RX ADMIN — HYDRALAZINE HYDROCHLORIDE 50 MILLIGRAM(S): 10 TABLET ORAL at 17:17

## 2025-01-12 NOTE — PROGRESS NOTE ADULT - SUBJECTIVE AND OBJECTIVE BOX
NEPHROLOGY INTERVAL HPI/OVERNIGHT EVENTS:    Interim noted   No events on HD yesterday  Meds the same     MEDICATIONS  (STANDING):  aspirin enteric coated 81 milliGRAM(s) Oral daily  atorvastatin 40 milliGRAM(s) Oral at bedtime  azithromycin  IVPB 500 milliGRAM(s) IV Intermittent every 24 hours  azithromycin  IVPB      cloNIDine 0.1 milliGRAM(s) Oral two times a day  epoetin yolanda-epbx (RETACRIT) Injectable 75871 Unit(s) IV Push <User Schedule>  guaifenesin/dextromethorphan Oral Liquid 10 milliLiter(s) Oral every 4 hours  heparin   Injectable 5000 Unit(s) SubCutaneous every 12 hours  hydrALAZINE 50 milliGRAM(s) Oral two times a day  isosorbide   mononitrate ER Tablet (IMDUR) 60 milliGRAM(s) Oral daily  levalbuterol Inhalation 0.63 milliGRAM(s) Inhalation every 6 hours  levETIRAcetam   Injectable 500 milliGRAM(s) IV Push every 12 hours  LORazepam   Injectable 1 milliGRAM(s) IV Push once  methylPREDNISolone sodium succinate Injectable 40 milliGRAM(s) IV Push two times a day  metoprolol tartrate 50 milliGRAM(s) Oral three times a day  pantoprazole    Tablet 40 milliGRAM(s) Oral before breakfast  senna 2 Tablet(s) Oral at bedtime  sevelamer carbonate 1600 milliGRAM(s) Oral three times a day with meals  sodium zirconium cyclosilicate 10 Gram(s) Oral once  tamsulosin 0.4 milliGRAM(s) Oral at bedtime    MEDICATIONS  (PRN):  acetaminophen     Tablet .. 650 milliGRAM(s) Oral every 6 hours PRN Temp greater or equal to 38C (100.4F), Mild Pain (1 - 3)  aluminum hydroxide/magnesium hydroxide/simethicone Suspension 30 milliLiter(s) Oral every 4 hours PRN Dyspepsia  benzonatate 100 milliGRAM(s) Oral every 8 hours PRN Cough  diltiazem Injectable 10 milliGRAM(s) IV Push every 6 hours PRN HR >120s  LORazepam   Injectable 0.2 milliGRAM(s) IV Push every 12 hours PRN for severe nausea and vomiting  melatonin 3 milliGRAM(s) Oral at bedtime PRN Insomnia  metoclopramide Injectable 10 milliGRAM(s) IV Push every 6 hours PRN nausea vomiting  ondansetron Injectable 4 milliGRAM(s) IV Push every 6 hours PRN Nausea and/or Vomiting      Allergies    penicillin (Other)    Intolerances            Vital Signs Last 24 Hrs  T(C): 36.9 (12 Jan 2025 04:49), Max: 36.9 (12 Jan 2025 04:49)  T(F): 98.4 (12 Jan 2025 04:49), Max: 98.4 (12 Jan 2025 04:49)  HR: 123 (12 Jan 2025 04:49) (122 - 125)  BP: 123/77 (12 Jan 2025 04:49) (121/75 - 127/78)  BP(mean): --  RR: 18 (12 Jan 2025 04:49) (18 - 18)  SpO2: 97% (12 Jan 2025 04:49) (97% - 100%)      Daily     Daily   I&O's Detail    I&O's Summary      PHYSICAL EXAM:    HEAD:  Atraumatic, Normocephalic , no edema   NECK: Supple, No JVD,   NERVOUS SYSTEM:  Alert & Oriented X3,  CHEST/LUNG: EAE , No wheeze   HEART: tachy / regular   ABDOMEN: Soft, Nontender, Nondistended; Bowel sounds present  EXTREMITIES:  no sig edema . L access w/ thrill       LABS:                        8.5    3.48  )-----------( 63       ( 12 Jan 2025 06:15 )             26.4     01-12    137  |  96  |  105.4[H]  ----------------------------<  129[H]  4.8   |  22.0  |  8.65[H]    Ca    7.9[L]      12 Jan 2025 06:15  Phos  8.0     01-12  Mg     2.4     01-12        Urinalysis Basic - ( 12 Jan 2025 06:15 )    Color: x / Appearance: x / SG: x / pH: x  Gluc: 129 mg/dL / Ketone: x  / Bili: x / Urobili: x   Blood: x / Protein: x / Nitrite: x   Leuk Esterase: x / RBC: x / WBC x   Sq Epi: x / Non Sq Epi: x / Bacteria: x      Magnesium: 2.4 mg/dL (01-12 @ 06:15)  Phosphorus: 8.0 mg/dL (01-12 @ 06:15)    ABG - ( 11 Jan 2025 15:00 )  pH, Arterial: 7.380 pH, Blood: x     /  pCO2: 43    /  pO2: 63    / HCO3: 25    / Base Excess: 0.3   /  SaO2: 92.2                  RADIOLOGY & ADDITIONAL TESTS:

## 2025-01-12 NOTE — PROGRESS NOTE ADULT - ASSESSMENT
65 y/o M w/ PMH of Afib (s/p watchman and no longer on AC), ESRD (on HD T/Th/Sa; LUE AFV), chronic anemia, chronic thrombocytopenia, rectal bleeding, CAD s/p CABG, aortic & mitral valve replacement, Type A aortic dissection s/p repair complicated by ischemic bowel s/p resection, seizure disorder on Keppra, IE of MV with MSSA and s/p course of Ancef with f/u MUSTAPHA negative for vegetation in 23', presents to the ED for 2 days of sob, nasal congestion, nonproductive cough w/ associated N/V and abd pain.  Pain is periumbilical and emesis is NBNB.  Vitals notable for sinus tachy in 120's, tachypnea and O2 sat 80% ORA, now on 4L NC and sat'ing 95%.  Clinically toxic appearing w/ clear nasal discharge, poor inspiratory effort, scattered rales, and benign abd exam.  Initial w/u significant for trop 70, BNP>33K, AGMA 20, HCO3 20, BUN 72, Cr 7.95, lipase 7 and lactate 1.0, no acute ischemic changes on EKG. UA w/out pyuria.  CTA c/a/p reporting new moderate ascites, possible right pyelo and LLL infiltrate.  Received 1L IVNS, bentyl 10mgx1, reglan 10mg x1, morphine 4mg x3, zofran 4mg x2, sucralfate 1g x1 in ED.  Will admit for acute hypoxic respiratory failure and sepsis 2/2 Flu A    #AMS  - much improved from yesterdays exam   - ABG w/ hypoxia, no hypercapnia (done on RA)  - CTH cancelled   - aspiration precautions  - will keep off dilaudid arnold since pt is refusing HD     #Acute hypoxic respiratory failure 2/2 viral PNA  #Sepsis 2/2 Flu A   - Sinus tachy, tachypnea ABG ordered encourage to keep oxygen on   - cont tamiflu for influenza infection   - c/w azithro for bronchitis,  Qtc 478  - Supportive care with cough suppressant and expectorant   cont tele   - wean oxygen as tolerated     #sinus tachycardia -h/o Afib  - s/p watchman and no longer on AC  - change to metoprolol  - iv cardizem prn   - cardiology evaluation   - TTE done in dev 4 EF 50-55 %     #Abdominal pain, N/V possible multifactorial    - c/w anti-emetics and analgesics prn , protonix     #Ascites   - called IR for paracentesis, pt refused     #Type II MI likely demand 2/2 above vs poor renal clearance   - Trop 70 and BNP >33K denies CP   - EKG w/ no acute ischemic changes    #AMGA likely 2/2 uremia and sepsis  - Serum HCO3 mildly low and Lactate normal   - refusing HD, will not place on fluids as pt refusing HD and will not place on bicarb tabs as pt refusing po meds at this time     #ESRD with hyperkalemia   - HD yesterday 1/10, refused this am     #Chronic normocytic anemia   #Chronic thrombocytopenia  - H/H currently better than baseline (ranges 7-8.5)    #CAD s/p CABG  #HTN / HLD  - cont asa  - c/w statin therapy   - controlled cont home meds     #Seizure disorder   - c/w keppra IV    #BPH  - c/w flomax     #Incidental CT finding   - Readministration of 1.5 cm sized enhancing right renal lesion, concerning for neoplasm  - Refer to heme/onc on discharge     #Aortic dissection  - h/o known aortic dissection, appears stable on CT  - out pt f/u    VTE ppx:  heparin sq bid     Dispo: Acute improvement in resp status and electrolytes  63 y/o M w/ PMH of Afib (s/p watchman and no longer on AC), ESRD (on HD T/Th/Sa; LUE AFV), chronic anemia, chronic thrombocytopenia, rectal bleeding, CAD s/p CABG, aortic & mitral valve replacement, Type A aortic dissection s/p repair complicated by ischemic bowel s/p resection, seizure disorder on Keppra, IE of MV with MSSA and s/p course of Ancef with f/u MUSTAPHA negative for vegetation in 23', presents to the ED for 2 days of sob, nasal congestion, nonproductive cough w/ associated N/V and abd pain.  Pain is periumbilical and emesis is NBNB.  Vitals notable for sinus tachy in 120's, tachypnea and O2 sat 80% ORA, now on 4L NC and sat'ing 95%.  Clinically toxic appearing w/ clear nasal discharge, poor inspiratory effort, scattered rales, and benign abd exam.  Initial w/u significant for trop 70, BNP>33K, AGMA 20, HCO3 20, BUN 72, Cr 7.95, lipase 7 and lactate 1.0, no acute ischemic changes on EKG. UA w/out pyuria.  CTA c/a/p reporting new moderate ascites, possible right pyelo and LLL infiltrate.  Received 1L IVNS, bentyl 10mgx1, reglan 10mg x1, morphine 4mg x3, zofran 4mg x2, sucralfate 1g x1 in ED.  Will admit for acute hypoxic respiratory failure and sepsis 2/2 Flu A    #AMS  - much improved from yesterdays exam   - ABG w/ hypoxia, no hypercapnia (done on RA)  - CTH cancelled   - aspiration precautions  - will keep off dilaudid arnold since pt is refusing HD     #Acute hypoxic respiratory failure 2/2 viral PNA  #Sepsis 2/2 Flu A   - Sinus tachy, tachypnea ABG ordered encourage to keep oxygen on   - cont tamiflu for influenza infection   - c/w azithro for bronchitis,  Qtc 478  - Supportive care with cough suppressant and expectorant   cont tele   - wean oxygen as tolerated     #sinus tachycardia -h/o Afib  - s/p watchman and no longer on AC  - increased metoprolol  - iv cardizem prn   - cardiology evaluation   - TTE done in dev 4 EF 50-55 %     #Abdominal pain, N/V possible multifactorial    - c/w anti-emetics and analgesics prn , protonix     #Ascites   - called IR for paracentesis, pt refused     #Type II MI likely demand 2/2 above vs poor renal clearance   - Trop 70 and BNP >33K denies CP   - EKG w/ no acute ischemic changes    #AMGA likely 2/2 uremia and sepsis  - Serum HCO3 mildly low and Lactate normal   - refusing HD, will not place on fluids as pt refusing HD and will not place on bicarb tabs as pt refusing po meds at this time     #ESRD with hyperkalemia   - HD yesterday 1/10, refused this am     #Chronic normocytic anemia   #Chronic thrombocytopenia  - H/H currently better than baseline (ranges 7-8.5)    #CAD s/p CABG  #HTN / HLD  - cont asa  - c/w statin therapy   - controlled cont home meds     #Seizure disorder   - c/w keppra IV    #BPH  - c/w flomax     #Incidental CT finding   - Readministration of 1.5 cm sized enhancing right renal lesion, concerning for neoplasm  - Refer to heme/onc on discharge     #Aortic dissection  - h/o known aortic dissection, appears stable on CT  - out pt f/u    VTE ppx:  heparin sq bid     Dispo: Acute improvement in resp status and electrolytes

## 2025-01-12 NOTE — PROGRESS NOTE ADULT - SUBJECTIVE AND OBJECTIVE BOX
[facility] Division of Hospital Medicine    INTERVAL HISTORY:  Overnight, no acute events.     Patient seen and examined at bedside this morning. Much more awake today, eating breakfast. Took some oral medications today. Denies any acute complaints.     MEDICATIONS  (STANDING):  aspirin enteric coated 81 milliGRAM(s) Oral daily  atorvastatin 40 milliGRAM(s) Oral at bedtime  azithromycin  IVPB      azithromycin  IVPB 500 milliGRAM(s) IV Intermittent every 24 hours  cloNIDine 0.1 milliGRAM(s) Oral two times a day  guaifenesin/dextromethorphan Oral Liquid 10 milliLiter(s) Oral every 4 hours  heparin   Injectable 5000 Unit(s) SubCutaneous every 12 hours  hydrALAZINE 50 milliGRAM(s) Oral two times a day  isosorbide   mononitrate ER Tablet (IMDUR) 60 milliGRAM(s) Oral daily  levalbuterol Inhalation 0.63 milliGRAM(s) Inhalation every 6 hours  levETIRAcetam   Injectable 500 milliGRAM(s) IV Push every 12 hours  LORazepam   Injectable 1 milliGRAM(s) IV Push once  methylPREDNISolone sodium succinate Injectable 40 milliGRAM(s) IV Push two times a day  metoprolol tartrate 50 milliGRAM(s) Oral three times a day  pantoprazole    Tablet 40 milliGRAM(s) Oral before breakfast  senna 2 Tablet(s) Oral at bedtime  sevelamer carbonate 1600 milliGRAM(s) Oral three times a day with meals  sodium zirconium cyclosilicate 10 Gram(s) Oral once  tamsulosin 0.4 milliGRAM(s) Oral at bedtime    MEDICATIONS  (PRN):  acetaminophen     Tablet .. 650 milliGRAM(s) Oral every 6 hours PRN Temp greater or equal to 38C (100.4F), Mild Pain (1 - 3)  aluminum hydroxide/magnesium hydroxide/simethicone Suspension 30 milliLiter(s) Oral every 4 hours PRN Dyspepsia  benzonatate 100 milliGRAM(s) Oral every 8 hours PRN Cough  diltiazem Injectable 10 milliGRAM(s) IV Push every 6 hours PRN HR >120s  LORazepam   Injectable 0.2 milliGRAM(s) IV Push every 12 hours PRN for severe nausea and vomiting  melatonin 3 milliGRAM(s) Oral at bedtime PRN Insomnia  metoclopramide Injectable 10 milliGRAM(s) IV Push every 6 hours PRN nausea vomiting  ondansetron Injectable 4 milliGRAM(s) IV Push every 6 hours PRN Nausea and/or Vomiting        I&O's Summary      PHYSICAL EXAM:  Vital Signs Last 24 Hrs  T(C): 36.9 (12 Jan 2025 04:49), Max: 36.9 (12 Jan 2025 04:49)  T(F): 98.4 (12 Jan 2025 04:49), Max: 98.4 (12 Jan 2025 04:49)  HR: 123 (12 Jan 2025 04:49) (122 - 125)  BP: 123/77 (12 Jan 2025 04:49) (121/75 - 127/78)  BP(mean): --  RR: 18 (12 Jan 2025 04:49) (18 - 18)  SpO2: 97% (12 Jan 2025 04:49) (97% - 100%)      CONSTITUTIONAL: awake, alert  HEENT: Normocephalic, Atraumatic  RESPIRATORY: lungs are clear to auscultation bilaterally, No crackles, rhonchi, wheezes  CARDIOVASCULAR: Regular rate and rhythm, No lower extremity edema  ABDOMEN: Soft, non-distended, nontender to palpation, +BS  MUSCLOSKELETAL: moving all 4 extremities spontaneously  NEUROLOGY: Alert, Oriented    LABS:                        8.5    3.48  )-----------( 63       ( 12 Jan 2025 06:15 )             26.4     01-12    137  |  96  |  105.4[H]  ----------------------------<  129[H]  4.8   |  22.0  |  8.65[H]    Ca    7.9[L]      12 Jan 2025 06:15  Phos  8.0     01-12  Mg     2.4     01-12            Urinalysis Basic - ( 12 Jan 2025 06:15 )    Color: x / Appearance: x / SG: x / pH: x  Gluc: 129 mg/dL / Ketone: x  / Bili: x / Urobili: x   Blood: x / Protein: x / Nitrite: x   Leuk Esterase: x / RBC: x / WBC x   Sq Epi: x / Non Sq Epi: x / Bacteria: x        CAPILLARY BLOOD GLUCOSE            RADIOLOGY & ADDITIONAL TESTS:  Results Reviewed

## 2025-01-12 NOTE — PROGRESS NOTE ADULT - ASSESSMENT
63 y/o M w/ PMH of Afib (s/p watchman and no longer on AC), ESRD (on HD T/Th/Sa; LUE AFV), chronic anemia, chronic thrombocytopenia, rectal bleeding, CAD s/p CABG, aortic & mitral valve replacement, Type A aortic dissection s/p repair complicated by ischemic bowel s/p resection, seizure disorder on Keppra, IE of MV with MSSA and s/p course of Ancef with f/u MUSTAPHA negative for vegetation in 23', presents to the ED for 2 days of sob, nasal congestion, nonproductive cough w/ associated N/V and abd pain. Found to be FluA positive and persistently tachycardic.    ESRD - HD usually TTS in [facility]   Intermittently refuses HD   Gets Benadryl with HD   HR limiting effectual UF with HD   Last HD 1/10  Refused HD yesterday , will see if agreeable to go tomorrow     Anemia - Epogen with HD     ST --> (+) Flu   Cardio following with regards to rate control   IV Cardizem as needed   Was refusing to take oral medications   Renal dose Tamiflu and Zmax     Mild ascites on sonogram  1/8 --> Pt also refused paracentesis     RO - Renvela resumed with meals , trend phos

## 2025-01-13 LAB
ANION GAP SERPL CALC-SCNC: 21 MMOL/L — HIGH (ref 5–17)
BUN SERPL-MCNC: 122.9 MG/DL — HIGH (ref 8–20)
CALCIUM SERPL-MCNC: 8 MG/DL — LOW (ref 8.4–10.5)
CHLORIDE SERPL-SCNC: 94 MMOL/L — LOW (ref 96–108)
CO2 SERPL-SCNC: 20 MMOL/L — LOW (ref 22–29)
CREAT SERPL-MCNC: 8.98 MG/DL — HIGH (ref 0.5–1.3)
CULTURE RESULTS: SIGNIFICANT CHANGE UP
CULTURE RESULTS: SIGNIFICANT CHANGE UP
EGFR: 6 ML/MIN/1.73M2 — LOW
GLUCOSE BLDC GLUCOMTR-MCNC: 145 MG/DL — HIGH (ref 70–99)
GLUCOSE SERPL-MCNC: 143 MG/DL — HIGH (ref 70–99)
HCT VFR BLD CALC: 26.1 % — LOW (ref 39–50)
HGB BLD-MCNC: 8.7 G/DL — LOW (ref 13–17)
MAGNESIUM SERPL-MCNC: 2.7 MG/DL — HIGH (ref 1.6–2.6)
MCHC RBC-ENTMCNC: 30.9 PG — SIGNIFICANT CHANGE UP (ref 27–34)
MCHC RBC-ENTMCNC: 33.3 G/DL — SIGNIFICANT CHANGE UP (ref 32–36)
MCV RBC AUTO: 92.6 FL — SIGNIFICANT CHANGE UP (ref 80–100)
PHOSPHATE SERPL-MCNC: 6.4 MG/DL — HIGH (ref 2.4–4.7)
PLATELET # BLD AUTO: 76 K/UL — LOW (ref 150–400)
POTASSIUM SERPL-MCNC: 4.5 MMOL/L — SIGNIFICANT CHANGE UP (ref 3.5–5.3)
POTASSIUM SERPL-SCNC: 4.5 MMOL/L — SIGNIFICANT CHANGE UP (ref 3.5–5.3)
RBC # BLD: 2.82 M/UL — LOW (ref 4.2–5.8)
RBC # FLD: 15.9 % — HIGH (ref 10.3–14.5)
SODIUM SERPL-SCNC: 135 MMOL/L — SIGNIFICANT CHANGE UP (ref 135–145)
SPECIMEN SOURCE: SIGNIFICANT CHANGE UP
SPECIMEN SOURCE: SIGNIFICANT CHANGE UP
WBC # BLD: 7.29 K/UL — SIGNIFICANT CHANGE UP (ref 3.8–10.5)
WBC # FLD AUTO: 7.29 K/UL — SIGNIFICANT CHANGE UP (ref 3.8–10.5)

## 2025-01-13 PROCEDURE — 99233 SBSQ HOSP IP/OBS HIGH 50: CPT

## 2025-01-13 RX ORDER — HYDROMORPHONE HCL 4 MG
0.5 TABLET ORAL ONCE
Refills: 0 | Status: DISCONTINUED | OUTPATIENT
Start: 2025-01-13 | End: 2025-01-13

## 2025-01-13 RX ADMIN — PANTOPRAZOLE 40 MILLIGRAM(S): 40 TABLET, DELAYED RELEASE ORAL at 06:12

## 2025-01-13 RX ADMIN — Medication 50 MILLIGRAM(S): at 21:53

## 2025-01-13 RX ADMIN — CLONIDINE HYDROCHLORIDE 0.1 MILLIGRAM(S): 0.2 TABLET ORAL at 18:08

## 2025-01-13 RX ADMIN — Medication 0.5 MILLIGRAM(S): at 21:52

## 2025-01-13 RX ADMIN — Medication 81 MILLIGRAM(S): at 16:31

## 2025-01-13 RX ADMIN — HYDRALAZINE HYDROCHLORIDE 50 MILLIGRAM(S): 10 TABLET ORAL at 18:07

## 2025-01-13 RX ADMIN — Medication 50 MILLIGRAM(S): at 06:11

## 2025-01-13 RX ADMIN — Medication 60 MILLIGRAM(S): at 16:31

## 2025-01-13 RX ADMIN — ATORVASTATIN CALCIUM 40 MILLIGRAM(S): 40 TABLET, FILM COATED ORAL at 21:53

## 2025-01-13 RX ADMIN — HEPARIN SODIUM 5000 UNIT(S): 1000 INJECTION, SOLUTION INTRAVENOUS; SUBCUTANEOUS at 06:12

## 2025-01-13 RX ADMIN — TAMSULOSIN HYDROCHLORIDE 0.4 MILLIGRAM(S): 0.4 CAPSULE ORAL at 21:53

## 2025-01-13 RX ADMIN — LEVETIRACETAM 500 MILLIGRAM(S): 100 SOLUTION ORAL at 06:11

## 2025-01-13 RX ADMIN — METHYLPREDNISOLONE 40 MILLIGRAM(S): 4 TABLET ORAL at 18:07

## 2025-01-13 RX ADMIN — HEPARIN SODIUM 5000 UNIT(S): 1000 INJECTION, SOLUTION INTRAVENOUS; SUBCUTANEOUS at 18:07

## 2025-01-13 RX ADMIN — DILTIAZEM HYDROCHLORIDE 10 MILLIGRAM(S): 300 CAPSULE, COATED, EXTENDED RELEASE ORAL at 00:30

## 2025-01-13 RX ADMIN — ONDANSETRON 4 MILLIGRAM(S): 4 TABLET ORAL at 00:53

## 2025-01-13 RX ADMIN — CLONIDINE HYDROCHLORIDE 0.1 MILLIGRAM(S): 0.2 TABLET ORAL at 06:11

## 2025-01-13 RX ADMIN — Medication 50 MILLIGRAM(S): at 16:31

## 2025-01-13 RX ADMIN — EPOETIN ALFA 10000 UNIT(S): 2000 SOLUTION INTRAVENOUS; SUBCUTANEOUS at 13:00

## 2025-01-13 RX ADMIN — HYDRALAZINE HYDROCHLORIDE 50 MILLIGRAM(S): 10 TABLET ORAL at 06:12

## 2025-01-13 RX ADMIN — METHYLPREDNISOLONE 40 MILLIGRAM(S): 4 TABLET ORAL at 06:11

## 2025-01-13 RX ADMIN — LEVETIRACETAM 500 MILLIGRAM(S): 100 SOLUTION ORAL at 18:08

## 2025-01-13 RX ADMIN — AZITHROMYCIN MONOHYDRATE 255 MILLIGRAM(S): 200 POWDER, FOR SUSPENSION ORAL at 10:04

## 2025-01-13 RX ADMIN — Medication 0.5 MILLIGRAM(S): at 22:52

## 2025-01-13 NOTE — PROGRESS NOTE ADULT - ASSESSMENT
65 y/o M w/ PMH of Afib (s/p watchman and no longer on AC), ESRD (on HD T/Th/Sa; LUE AFV), chronic anemia, chronic thrombocytopenia, rectal bleeding, CAD s/p CABG, aortic & mitral valve replacement, Type A aortic dissection s/p repair complicated by ischemic bowel s/p resection, seizure disorder on Keppra, IE of MV with MSSA and s/p course of Ancef with f/u MUSTAPHA negative for vegetation in 23', presents to the ED for 2 days of sob, nasal congestion, nonproductive cough w/ associated N/V and abd pain. Found to be FluA positive and persistently tachycardic.    ESRD - HD usually TTS in [facility]   Intermittently refuses HD   Gets Benadryl with HD   HR limiting effectual UF with HD   Last HD 1/10  Refused HD yesterday ,f agreeable to go today    Anemia - Epogen with HD     ST --> (+) Flu   Cardio following with regards to rate control   IV Cardizem as needed   Was refusing to take oral medications   Renal dose Tamiflu and Zmax     Mild ascites on sonogram  1/8 --> Pt also refused paracentesis     RO - Renvela resumed with meals , trend phos

## 2025-01-13 NOTE — PROGRESS NOTE ADULT - SUBJECTIVE AND OBJECTIVE BOX
[facility] Division of Hospital Medicine    INTERVAL HISTORY:  Overnight, no acute events.     Patient seen and examined at bedside this morning. More awake today, amenable to HD today. Took some oral medications today. Denies any acute complaints.     MEDICATIONS  (STANDING):  aspirin enteric coated 81 milliGRAM(s) Oral daily  atorvastatin 40 milliGRAM(s) Oral at bedtime  cloNIDine 0.1 milliGRAM(s) Oral two times a day  epoetin yolanda-epbx (RETACRIT) Injectable 15645 Unit(s) IV Push <User Schedule>  guaifenesin/dextromethorphan Oral Liquid 10 milliLiter(s) Oral every 4 hours  heparin   Injectable 5000 Unit(s) SubCutaneous every 12 hours  hydrALAZINE 50 milliGRAM(s) Oral two times a day  isosorbide   mononitrate ER Tablet (IMDUR) 60 milliGRAM(s) Oral daily  levalbuterol Inhalation 0.63 milliGRAM(s) Inhalation every 6 hours  levETIRAcetam   Injectable 500 milliGRAM(s) IV Push every 12 hours  LORazepam   Injectable 1 milliGRAM(s) IV Push once  methylPREDNISolone sodium succinate Injectable 40 milliGRAM(s) IV Push two times a day  metoprolol tartrate 50 milliGRAM(s) Oral three times a day  pantoprazole    Tablet 40 milliGRAM(s) Oral before breakfast  senna 2 Tablet(s) Oral at bedtime  sevelamer carbonate 1600 milliGRAM(s) Oral three times a day with meals  sodium zirconium cyclosilicate 10 Gram(s) Oral once  tamsulosin 0.4 milliGRAM(s) Oral at bedtime    MEDICATIONS  (PRN):  acetaminophen     Tablet .. 650 milliGRAM(s) Oral every 6 hours PRN Temp greater or equal to 38C (100.4F), Mild Pain (1 - 3)  aluminum hydroxide/magnesium hydroxide/simethicone Suspension 30 milliLiter(s) Oral every 4 hours PRN Dyspepsia  benzonatate 100 milliGRAM(s) Oral every 8 hours PRN Cough  diltiazem Injectable 10 milliGRAM(s) IV Push every 6 hours PRN HR >120s  LORazepam   Injectable 0.2 milliGRAM(s) IV Push every 12 hours PRN for severe nausea and vomiting  melatonin 3 milliGRAM(s) Oral at bedtime PRN Insomnia  metoclopramide Injectable 10 milliGRAM(s) IV Push every 6 hours PRN nausea vomiting  ondansetron Injectable 4 milliGRAM(s) IV Push every 6 hours PRN Nausea and/or Vomiting        I&O's Summary      PHYSICAL EXAM:  Vital Signs Last 24 Hrs  T(C): 36.4 (13 Jan 2025 12:28), Max: 37.1 (13 Jan 2025 00:03)  T(F): 97.6 (13 Jan 2025 12:28), Max: 98.8 (13 Jan 2025 00:03)  HR: 108 (13 Jan 2025 16:10) (64 - 128)  BP: 156/83 (13 Jan 2025 16:10) (131/68 - 167/89)  BP(mean): --  RR: 17 (13 Jan 2025 16:10) (17 - 19)  SpO2: 96% (13 Jan 2025 16:10) (95% - 98%)    Parameters below as of 13 Jan 2025 16:10  Patient On (Oxygen Delivery Method): room air        CONSTITUTIONAL: awake, alert  HEENT: Normocephalic, Atraumatic  RESPIRATORY: lungs are clear to auscultation bilaterally, No crackles, rhonchi, wheezes  CARDIOVASCULAR: Regular rate and rhythm, No lower extremity edema  ABDOMEN: Soft, non-distended, nontender to palpation, +BS  MUSCLOSKELETAL: moving all 4 extremities spontaneously  NEUROLOGY: Alert, Oriented    LABS:                        8.7    7.29  )-----------( 76       ( 13 Jan 2025 05:45 )             26.1     01-13    135  |  94[L]  |  122.9[H]  ----------------------------<  143[H]  4.5   |  20.0[L]  |  8.98[H]    Ca    8.0[L]      13 Jan 2025 05:45  Phos  6.4     01-13  Mg     2.7     01-13            Urinalysis Basic - ( 13 Jan 2025 05:45 )    Color: x / Appearance: x / SG: x / pH: x  Gluc: 143 mg/dL / Ketone: x  / Bili: x / Urobili: x   Blood: x / Protein: x / Nitrite: x   Leuk Esterase: x / RBC: x / WBC x   Sq Epi: x / Non Sq Epi: x / Bacteria: x        CAPILLARY BLOOD GLUCOSE      POCT Blood Glucose.: 145 mg/dL (13 Jan 2025 09:09)        RADIOLOGY & ADDITIONAL TESTS:  Results Reviewed

## 2025-01-13 NOTE — PROGRESS NOTE ADULT - TIME BILLING
reviewing the chart documentation, reviewing labs and imaging studies, evaluating the patient, discussing the plan of care with the consultants & medical team, and documenting.
- Ordering, reviewing, and interpreting labs, testing, and imaging.  - Independently obtaining a review of systems and performing a physical exam  - Reviewing consultant documentation/recommendations in addition to discussing plan of care with consultants.  - Counselling and educating patient and family regarding interpretation of aforementioned items and plan of care.
reviewing the chart documentation, reviewing labs and imaging studies, evaluating the patient, discussing the plan of care with the consultants & medical team, and documenting.
reviewing the chart documentation, reviewing labs and imaging studies, evaluating the patient, discussing the plan of care with the consultants & medical team, and documenting.

## 2025-01-13 NOTE — PROGRESS NOTE ADULT - ASSESSMENT
63 y/o M w/ PMH of Afib (s/p watchman and no longer on AC), ESRD (on HD T/Th/Sa; LUE AFV), chronic anemia, chronic thrombocytopenia, rectal bleeding, CAD s/p CABG, aortic & mitral valve replacement, Type A aortic dissection s/p repair complicated by ischemic bowel s/p resection, seizure disorder on Keppra, IE of MV with MSSA and s/p course of Ancef with f/u MUSTAPHA negative for vegetation in 23', presents to the ED for 2 days of sob, nasal congestion, nonproductive cough w/ associated N/V and abd pain.  Pain is periumbilical and emesis is NBNB.  Vitals notable for sinus tachy in 120's, tachypnea and O2 sat 80% ORA, now on 4L NC and sat'ing 95%.  Clinically toxic appearing w/ clear nasal discharge, poor inspiratory effort, scattered rales, and benign abd exam.  Initial w/u significant for trop 70, BNP>33K, AGMA 20, HCO3 20, BUN 72, Cr 7.95, lipase 7 and lactate 1.0, no acute ischemic changes on EKG. UA w/out pyuria.  CTA c/a/p reporting new moderate ascites, possible right pyelo and LLL infiltrate.  Received 1L IVNS, bentyl 10mgx1, reglan 10mg x1, morphine 4mg x3, zofran 4mg x2, sucralfate 1g x1 in ED.  Will admit for acute hypoxic respiratory failure and sepsis 2/2 Flu A    #AMS  - much improved from yesterdays exam   - ABG w/ hypoxia, no hypercapnia (done on RA)  - CTH cancelled   - aspiration precautions  - will keep off dilaudid arnold since pt is refusing HD     #Acute hypoxic respiratory failure 2/2 viral PNA  #Sepsis 2/2 Flu A   - Sinus tachy, tachypnea ABG ordered encourage to keep oxygen on   - cont tamiflu for influenza infection   - c/w azithro for bronchitis,  Qtc 478  - Supportive care with cough suppressant and expectorant   cont tele   - wean oxygen as tolerated     #sinus tachycardia -h/o Afib  - s/p watchman and no longer on AC  - increased metoprolol  - iv cardizem prn   - cardiology evaluation   - TTE done in dev 4 EF 50-55 %     #Abdominal pain, N/V possible multifactorial    - c/w anti-emetics and analgesics prn , protonix     #Ascites   - called IR for paracentesis, pt refused     #Type II MI likely demand 2/2 above vs poor renal clearance   - Trop 70 and BNP >33K denies CP   - EKG w/ no acute ischemic changes    #AMGA likely 2/2 uremia and sepsis  - Serum HCO3 mildly low and Lactate normal   - refusing HD, will not place on fluids as pt refusing HD and will not place on bicarb tabs as pt refusing po meds at this time     #ESRD with hyperkalemia   - HD yesterday 1/10, refused this am     #Chronic normocytic anemia   #Chronic thrombocytopenia  - H/H currently better than baseline (ranges 7-8.5)    #CAD s/p CABG  #HTN / HLD  - cont asa  - c/w statin therapy   - controlled cont home meds     #Seizure disorder   - c/w keppra IV    #BPH  - c/w flomax     #Incidental CT finding   - Readministration of 1.5 cm sized enhancing right renal lesion, concerning for neoplasm  - Refer to heme/onc on discharge     #Aortic dissection  - h/o known aortic dissection, appears stable on CT  - out pt f/u    VTE ppx:  heparin sq bid     Dispo: wean O2

## 2025-01-13 NOTE — PROGRESS NOTE ADULT - SUBJECTIVE AND OBJECTIVE BOX
NEPHROLOGY INTERVAL HPI/OVERNIGHT EVENTS:    feels better   HR better   No new events   o new complaints     MEDICATIONS  (STANDING):  aspirin enteric coated 81 milliGRAM(s) Oral daily  atorvastatin 40 milliGRAM(s) Oral at bedtime  cloNIDine 0.1 milliGRAM(s) Oral two times a day  epoetin yolanda-epbx (RETACRIT) Injectable 00830 Unit(s) IV Push <User Schedule>  guaifenesin/dextromethorphan Oral Liquid 10 milliLiter(s) Oral every 4 hours  heparin   Injectable 5000 Unit(s) SubCutaneous every 12 hours  hydrALAZINE 50 milliGRAM(s) Oral two times a day  isosorbide   mononitrate ER Tablet (IMDUR) 60 milliGRAM(s) Oral daily  levalbuterol Inhalation 0.63 milliGRAM(s) Inhalation every 6 hours  levETIRAcetam   Injectable 500 milliGRAM(s) IV Push every 12 hours  LORazepam   Injectable 1 milliGRAM(s) IV Push once  methylPREDNISolone sodium succinate Injectable 40 milliGRAM(s) IV Push two times a day  metoprolol tartrate 50 milliGRAM(s) Oral three times a day  pantoprazole    Tablet 40 milliGRAM(s) Oral before breakfast  senna 2 Tablet(s) Oral at bedtime  sevelamer carbonate 1600 milliGRAM(s) Oral three times a day with meals  sodium zirconium cyclosilicate 10 Gram(s) Oral once  tamsulosin 0.4 milliGRAM(s) Oral at bedtime    MEDICATIONS  (PRN):  acetaminophen     Tablet .. 650 milliGRAM(s) Oral every 6 hours PRN Temp greater or equal to 38C (100.4F), Mild Pain (1 - 3)  aluminum hydroxide/magnesium hydroxide/simethicone Suspension 30 milliLiter(s) Oral every 4 hours PRN Dyspepsia  benzonatate 100 milliGRAM(s) Oral every 8 hours PRN Cough  diltiazem Injectable 10 milliGRAM(s) IV Push every 6 hours PRN HR >120s  LORazepam   Injectable 0.2 milliGRAM(s) IV Push every 12 hours PRN for severe nausea and vomiting  melatonin 3 milliGRAM(s) Oral at bedtime PRN Insomnia  metoclopramide Injectable 10 milliGRAM(s) IV Push every 6 hours PRN nausea vomiting  ondansetron Injectable 4 milliGRAM(s) IV Push every 6 hours PRN Nausea and/or Vomiting      Allergies    penicillin (Other)    Intolerances          Vital Signs Last 24 Hrs  T(C): 36.4 (2025 12:28), Max: 37.1 (2025 00:03)  T(F): 97.6 (2025 12:28), Max: 98.8 (2025 00:03)  HR: 64 (2025 12:) (64 - 128)  BP: 150/85 (2025 12:28) (131/68 - 167/89)  BP(mean): --  RR: 18 (2025 12:28) (17 - 19)  SpO2: 97% (2025 12:28) (95% - 98%)    Parameters below as of 2025 12:28  Patient On (Oxygen Delivery Method): room air      Daily     Daily Weight in k.6 (2025 12:28)  I&O's Detail    I&O's Summary      PHYSICAL EXAM:  HEAD:  Atraumatic, Normocephalic , no edema   NECK: Supple, No JVD,   NERVOUS SYSTEM:  Alert & Oriented X3,  CHEST/LUNG: EAE , No wheeze   HEART: tachy / regular   ABDOMEN: Soft, Nontender, Nondistended; Bowel sounds present  EXTREMITIES:  no sig edema . L access w/ thrill     LABS:                        8.7    7.29  )-----------( 76       ( 2025 05:45 )             26.1     01-13    135  |  94[L]  |  122.9[H]  ----------------------------<  143[H]  4.5   |  20.0[L]  |  8.98[H]    Ca    8.0[L]      2025 05:45  Phos  6.4       Mg     2.7     13        Urinalysis Basic - ( 2025 05:45 )    Color: x / Appearance: x / SG: x / pH: x  Gluc: 143 mg/dL / Ketone: x  / Bili: x / Urobili: x   Blood: x / Protein: x / Nitrite: x   Leuk Esterase: x / RBC: x / WBC x   Sq Epi: x / Non Sq Epi: x / Bacteria: x      Phosphorus: 6.4 mg/dL ( @ 05:45)  Magnesium: 2.7 mg/dL ( @ 05:45)    ABG - ( 2025 15:00 )  pH, Arterial: 7.380 pH, Blood: x     /  pCO2: 43    /  pO2: 63    / HCO3: 25    / Base Excess: 0.3   /  SaO2: 92.2                  RADIOLOGY & ADDITIONAL TESTS:

## 2025-01-14 ENCOUNTER — TRANSCRIPTION ENCOUNTER (OUTPATIENT)
Age: 65
End: 2025-01-14

## 2025-01-14 VITALS
OXYGEN SATURATION: 96 % | RESPIRATION RATE: 18 BRPM | HEART RATE: 82 BPM | DIASTOLIC BLOOD PRESSURE: 88 MMHG | TEMPERATURE: 98 F | SYSTOLIC BLOOD PRESSURE: 144 MMHG

## 2025-01-14 LAB
ANION GAP SERPL CALC-SCNC: 18 MMOL/L — HIGH (ref 5–17)
BUN SERPL-MCNC: 68.1 MG/DL — HIGH (ref 8–20)
CALCIUM SERPL-MCNC: 8.1 MG/DL — LOW (ref 8.4–10.5)
CHLORIDE SERPL-SCNC: 99 MMOL/L — SIGNIFICANT CHANGE UP (ref 96–108)
CO2 SERPL-SCNC: 24 MMOL/L — SIGNIFICANT CHANGE UP (ref 22–29)
CREAT SERPL-MCNC: 5.71 MG/DL — HIGH (ref 0.5–1.3)
EGFR: 10 ML/MIN/1.73M2 — LOW
GLUCOSE SERPL-MCNC: 170 MG/DL — HIGH (ref 70–99)
HCT VFR BLD CALC: 25.8 % — LOW (ref 39–50)
HGB BLD-MCNC: 8.7 G/DL — LOW (ref 13–17)
MAGNESIUM SERPL-MCNC: 2.3 MG/DL — SIGNIFICANT CHANGE UP (ref 1.8–2.6)
MCHC RBC-ENTMCNC: 30.5 PG — SIGNIFICANT CHANGE UP (ref 27–34)
MCHC RBC-ENTMCNC: 33.7 G/DL — SIGNIFICANT CHANGE UP (ref 32–36)
MCV RBC AUTO: 90.5 FL — SIGNIFICANT CHANGE UP (ref 80–100)
PHOSPHATE SERPL-MCNC: 4.6 MG/DL — SIGNIFICANT CHANGE UP (ref 2.4–4.7)
PLATELET # BLD AUTO: 102 K/UL — LOW (ref 150–400)
POTASSIUM SERPL-MCNC: 4.2 MMOL/L — SIGNIFICANT CHANGE UP (ref 3.5–5.3)
POTASSIUM SERPL-SCNC: 4.2 MMOL/L — SIGNIFICANT CHANGE UP (ref 3.5–5.3)
RBC # BLD: 2.85 M/UL — LOW (ref 4.2–5.8)
RBC # FLD: 16.1 % — HIGH (ref 10.3–14.5)
SODIUM SERPL-SCNC: 140 MMOL/L — SIGNIFICANT CHANGE UP (ref 135–145)
WBC # BLD: 6.64 K/UL — SIGNIFICANT CHANGE UP (ref 3.8–10.5)
WBC # FLD AUTO: 6.64 K/UL — SIGNIFICANT CHANGE UP (ref 3.8–10.5)

## 2025-01-14 PROCEDURE — 74176 CT ABD & PELVIS W/O CONTRAST: CPT | Mod: 26

## 2025-01-14 PROCEDURE — 99239 HOSP IP/OBS DSCHRG MGMT >30: CPT

## 2025-01-14 RX ORDER — DIPHENOXYLATE HYDROCHLORIDE AND ATROPINE SULFATE 2.5; .025 MG/1; MG/1
2 TABLET ORAL
Refills: 0 | DISCHARGE

## 2025-01-14 RX ORDER — CHLORHEXIDINE GLUCONATE 1.2 MG/ML
1 RINSE ORAL DAILY
Refills: 0 | Status: DISCONTINUED | OUTPATIENT
Start: 2025-01-14 | End: 2025-01-14

## 2025-01-14 RX ORDER — ACETAMINOPHEN 80 MG/.8ML
2 SOLUTION/ DROPS ORAL
Qty: 0 | Refills: 0 | DISCHARGE
Start: 2025-01-14

## 2025-01-14 RX ORDER — SEVELAMER CARBONATE 800 MG/1
2 TABLET, FILM COATED ORAL
Qty: 180 | Refills: 0
Start: 2025-01-14 | End: 2025-02-12

## 2025-01-14 RX ADMIN — CLONIDINE HYDROCHLORIDE 0.1 MILLIGRAM(S): 0.2 TABLET ORAL at 05:03

## 2025-01-14 RX ADMIN — HEPARIN SODIUM 5000 UNIT(S): 1000 INJECTION, SOLUTION INTRAVENOUS; SUBCUTANEOUS at 05:03

## 2025-01-14 RX ADMIN — METHYLPREDNISOLONE 40 MILLIGRAM(S): 4 TABLET ORAL at 05:02

## 2025-01-14 RX ADMIN — HYDRALAZINE HYDROCHLORIDE 50 MILLIGRAM(S): 10 TABLET ORAL at 05:03

## 2025-01-14 RX ADMIN — Medication 50 MILLIGRAM(S): at 14:44

## 2025-01-14 RX ADMIN — LEVETIRACETAM 500 MILLIGRAM(S): 100 SOLUTION ORAL at 05:02

## 2025-01-14 RX ADMIN — PANTOPRAZOLE 40 MILLIGRAM(S): 40 TABLET, DELAYED RELEASE ORAL at 05:04

## 2025-01-14 RX ADMIN — Medication 50 MILLIGRAM(S): at 05:03

## 2025-01-14 RX ADMIN — LORAZEPAM 1 MILLIGRAM(S): 1 TABLET ORAL at 01:39

## 2025-01-14 NOTE — DISCHARGE NOTE PROVIDER - NSFOLLOWUPCLINICS_GEN_ALL_ED_FT
[facility]  Hematology/Oncology  [address] [place] [address]  Phone: [phone]  Fax:

## 2025-01-14 NOTE — DISCHARGE NOTE NURSING/CASE MANAGEMENT/SOCIAL WORK - PATIENT PORTAL LINK FT
You can access the FollowMyHealth Patient Portal offered by [facility] by registering at the following website: [email or web address]. By joining [employer]’s FollowMyHealth portal, you will also be able to view your health information using other applications (apps) compatible with our system.

## 2025-01-14 NOTE — DISCHARGE NOTE NURSING/CASE MANAGEMENT/SOCIAL WORK - FINANCIAL ASSISTANCE
[facility] provides services at a reduced cost to those who are determined to be eligible through [facility]’s financial assistance program. Information regarding [facility]’s financial assistance program can be found by going to [email or web address] or by calling [phone].

## 2025-01-14 NOTE — PHYSICAL THERAPY INITIAL EVALUATION ADULT - PERTINENT HX OF CURRENT PROBLEM, REHAB EVAL
Pt is a 65 y/o male who presented from home with acute hypoxic respiratory failure and FLU. Pt is on dialysis.

## 2025-01-14 NOTE — PROGRESS NOTE ADULT - PROVIDER SPECIALTY LIST ADULT
Hospitalist
Nephrology
Hospitalist
Hospitalist
Nephrology
Hospitalist

## 2025-01-14 NOTE — DISCHARGE NOTE PROVIDER - PROVIDER TOKENS
FREE:[LAST:[primary care physician],PHONE:[(   )    -],FAX:[(   )    -]],PROVIDER:[TOKEN:[[id number]],ESTABLISHEDPATIENT:[T]]

## 2025-01-14 NOTE — DISCHARGE NOTE PROVIDER - ATTENDING DISCHARGE PHYSICAL EXAMINATION:
CONSTITUTIONAL: awake, alert  HEENT: Normocephalic, Atraumatic  RESPIRATORY: lungs are clear to auscultation bilaterally, No crackles, rhonchi, wheezes  CARDIOVASCULAR: Regular rate and rhythm, No lower extremity edema  ABDOMEN: Soft, non-distended, nontender to palpation, +BS  MUSCLOSKELETAL: moving all 4 extremities spontaneously  NEUROLOGY: Alert, Oriented x4

## 2025-01-14 NOTE — DISCHARGE NOTE PROVIDER - HOSPITAL COURSE
64yoM w/ PMH of Afib (s/p watchman and no longer on AC), ESRD (on HD T/Th/Sa; LUE AFV), chronic anemia, chronic thrombocytopenia, rectal bleeding, CAD s/p CABG, aortic & mitral valve replacement, Type A aortic dissection s/p repair complicated by ischemic bowel s/p resection, seizure disorder on Keppra, IE of MV with MSSA and s/p course of Ancef with f/u MUSTAPHA negative for vegetation in 23', presents to the ED for 2 days of sob, nasal congestion, nonproductive cough w/ associated N/V and abd pain. CTA c/a/p reporting new moderate ascites, possible right pyelo and LLL infiltrate. Admitted for acute hypoxic respiratory failure and sepsis 2/2 Flu A.     Patient treated with tamiflu, weaned off oxygen, completed antibiotics. Patient intermittently refusing HD, became very altered. Mentation returned to baseline after HD. Abd U/s with mild ascites. Repeat CT abd stable ascites. Patient refusing paracentesis. Patient had abd pain which resolved after BM.     Patient medically stable for discharge with outpt follow up with PCP, cardiology, nephro, and heme/onc (regarding right renal lesion).

## 2025-01-14 NOTE — PROGRESS NOTE ADULT - REASON FOR ADMISSION
acute hypoxic respiratory failure 2/2 flu A

## 2025-01-14 NOTE — DISCHARGE NOTE NURSING/CASE MANAGEMENT/SOCIAL WORK - NSDCPEFALRISK_GEN_ALL_CORE
For information on Fall & Injury Prevention, visit: [email or web address] OR  [email or web address] OR  https://www.cdc.gov/steadi/patient.html

## 2025-01-14 NOTE — DISCHARGE NOTE PROVIDER - NSDCCPCAREPLAN_GEN_ALL_CORE_FT
PRINCIPAL DISCHARGE DIAGNOSIS  Diagnosis: Acute hypoxic respiratory failure  Assessment and Plan of Treatment: - due to flu  - CTA chest w/ LLL infiltrate   - completed tamiflu and azithro  - Supportive care with cough suppressant and expectorant      SECONDARY DISCHARGE DIAGNOSES  Diagnosis: Ascites  Assessment and Plan of Treatment: - CT with small to mod ascites. U/S w/ small ascites  - pt refused paracentesis    Diagnosis: ESRD on dialysis  Assessment and Plan of Treatment: intermittently refusing HD  BUN very elevated which lead to confusion. Once agreeable to HD, BUN improved and mentation back to normal  continue with outpatient HD    Diagnosis: Chronic atrial fibrillation  Assessment and Plan of Treatment:     Diagnosis: CAD (coronary artery disease)  Assessment and Plan of Treatment:     Diagnosis: BPH with urinary obstruction  Assessment and Plan of Treatment:     Diagnosis: History of seizure disorder  Assessment and Plan of Treatment:     Diagnosis: Anemia of chronic disease  Assessment and Plan of Treatment:     Diagnosis: Renal lesion  Assessment and Plan of Treatment: - Incidental CT finding   - 1.5 cm sized enhancing right renal lesion, concerning for neoplasm  - recommend following up with heme/onc       Diagnosis: H/O aortic dissection  Assessment and Plan of Treatment: stable appearing on CT. Recommend outpatient follow up

## 2025-01-14 NOTE — DISCHARGE NOTE NURSING/CASE MANAGEMENT/SOCIAL WORK - NSDCVIVACCINE_GEN_ALL_CORE_FT
influenza, injectable, quadrivalent, preservative free; 12-Dec-2020 09:28; Lynn Gerardo); [employer]; [id number] (Exp. Date: 30-Jun-2021); IntraMuscular; Deltoid Right.; 0.5 milliLiter(s); VIS (VIS Published: 15-Aug-2019, VIS Presented: 12-Dec-2020);

## 2025-01-14 NOTE — DISCHARGE NOTE NURSING/CASE MANAGEMENT/SOCIAL WORK - NSDCFUADDAPPT_GEN_ALL_CORE_FT
LI spoke with employee, Carmela, from [facility] ([phone]) (fax: [phone]). Carmela confirmed patient's HD is resumed for Thursday- 1/16 at 5am. LI met with patient at bedside. Patient reported he is in agreement to attend [facility] on Thursday- 1/16 at 5am. Patient reported he will drive himself to HD appointment on Thursday- 1/16 at 5am at [facility].

## 2025-01-14 NOTE — DISCHARGE NOTE PROVIDER - CARE PROVIDER_API CALL
primary care physician,   Phone: (   )    -  Fax: (   )    -  Follow Up Time:     Jared Rubin  Nephrology  [address] [place] [id number]  Phone: [phone]  Fax: [phone]  Established Patient  Follow Up Time:

## 2025-01-14 NOTE — PHYSICAL THERAPY INITIAL EVALUATION ADULT - ACTIVE RANGE OF MOTION EXAMINATION, REHAB EVAL
3/1/21 OV,  PLAN:  - Diabetes- Controlled.     Continue Ozempic 0.25 mg weekly  Metformin 500 mg daily for 2-3 weeks message me and we will adjust as needed      -Continue diet, and start exercising and increase water intake.     -Get A1C done in Late March.     -Review of glycemic targets, fasting <110mg/dL and 2hr post prandial <140mg/dl.   -Reviewed the importance of adherence to medical regimen.   -Reviewed hypoglycemic events, including their prevention and treatment.   -Reviewed lifestyle changes, diet education, CHO counting, reviewed weight management and reviewed exercise plan, at least 150min of moderate exercise per week.     -Hypertension currently  Controlled- Continue regular follow up with PCP   -Hyperlipidemia currently  Controlled- Continue statin therapy.      Follow up with me in 6 months or sooner if needed.    Component      Latest Ref Rng & Units 11/8/2021   CREATININE, URINE (TOTAL)      mg/dL 107.00   MICROALBUMIN/CREAT      <30.0 mg/g 76.1 (H)   URINE MICRO      mg/dL 8.14     Component      Latest Ref Rng & Units 11/8/2021   Fasting Status      0 - 999 Hours 12   Sodium      135 - 145 mmol/L 139   Potassium      3.4 - 5.1 mmol/L 4.1   Chloride      98 - 107 mmol/L 106   CO2      21 - 32 mmol/L 26   ANION GAP      10 - 20 mmol/L 11   Glucose      70 - 99 mg/dL 133 (H)   BUN      6 - 20 mg/dL 27 (H)   Creatinine      0.67 - 1.17 mg/dL 0.80   BUN/CREATININE RATIO      7 - 25 34 (H)   CALCIUM      8.4 - 10.2 mg/dL 9.4   TOTAL BILIRUBIN      0.2 - 1.0 mg/dL 0.6   AST/SGOT      <=37 Units/L 18   ALT/SGPT      <64 Units/L 36   ALK PHOSPHATASE      45 - 117 Units/L 59   TOTAL PROTEIN      6.4 - 8.2 g/dL 7.9   Albumin      3.6 - 5.1 g/dL 3.6   GLOBULIN      2.0 - 4.0 g/dL 4.3 (H)   A/G Ratio, Serum      1.0 - 2.4 0.8 (L)   Glomerular Filtration Rate      >=60 >90     Component      Latest Ref Rng & Units 11/8/2021   GLYCOHEMOGLOBIN A1C      4.5 - 5.6 % 6.0 (H)      bilateral upper extremity Active ROM was WFL (within functional limits)/bilateral  lower extremity Active ROM was WFL (within functional limits)

## 2025-01-14 NOTE — PROGRESS NOTE ADULT - ASSESSMENT
63 y/o M w/ PMH of Afib (s/p watchman and no longer on AC), ESRD (on HD T/Th/Sa; LUE AFV), chronic anemia, chronic thrombocytopenia, rectal bleeding, CAD s/p CABG, aortic & mitral valve replacement, Type A aortic dissection s/p repair complicated by ischemic bowel s/p resection, seizure disorder on Keppra, IE of MV with MSSA and s/p course of Ancef with f/u MUSTAPHA negative for vegetation in 23', presents to the ED for 2 days of sob, nasal congestion, nonproductive cough w/ associated N/V and abd pain. Found to be FluA positive and persistently tachycardic.    ESRD - HD usually TTS in [facility]   Intermittently refuses HD   Gets Benadryl with HD   HR limiting effectual UF with HD   Last HD 1/13  Will set up HFD for tomorrow     Anemia - Epogen with HD     ST --> (+) Flu   Cardio following with regards to rate control   IV Cardizem as needed   Was refusing to take oral medications   Renal dose Tamiflu and Zmax     Mild ascites on sonogram  1/8 --> Pt also refused paracentesis     RO - Renvela resumed with meals , trend phos - better

## 2025-01-14 NOTE — DISCHARGE NOTE PROVIDER - NSDCMRMEDTOKEN_GEN_ALL_CORE_FT
acetaminophen 325 mg oral tablet: 2 tab(s) orally every 6 hours As needed Temp greater or equal to 38C (100.4F), Mild Pain (1 - 3)  amLODIPine 5 mg oral tablet: 1 tab(s) orally once a day  aspirin 81 mg oral delayed release tablet: 1 tab(s) orally once a day  atorvastatin 40 mg oral tablet: 1 tab(s) orally once a day (at bedtime)  carvedilol 12.5 mg oral tablet: 1 tab(s) orally every 12 hours  cloNIDine 0.1 mg oral tablet: 1 tab(s) orally 2 times a day  clopidogrel 75 mg oral tablet: 1 tab(s) orally once a day  diphenoxylate-atropine 2.5 mg-0.025 mg oral tablet: 2 tab(s) orally every 6 hours as needed  hydrALAZINE 100 mg oral tablet: 1 tab(s) orally 2 times a day  isosorbide mononitrate 60 mg oral tablet, extended release: 1 tab(s) orally once a day  levETIRAcetam 500 mg oral tablet: 1 tab(s) orally 2 times a day  pantoprazole 40 mg oral delayed release tablet: 1 tab(s) orally once a day (before a meal)  risankizumab: every 3 weeks  sevelamer carbonate 800 mg oral tablet: 2 tab(s) orally 3 times a day (with meals)  tamsulosin 0.4 mg oral capsule: 1 cap(s) orally once a day (at bedtime)  torsemide 20 mg oral tablet: 1 tab(s) orally every other day   acetaminophen 325 mg oral tablet: 2 tab(s) orally every 6 hours As needed Temp greater or equal to 38C (100.4F), Mild Pain (1 - 3)  amLODIPine 5 mg oral tablet: 1 tab(s) orally once a day  aspirin 81 mg oral delayed release tablet: 1 tab(s) orally once a day  atorvastatin 40 mg oral tablet: 1 tab(s) orally once a day (at bedtime)  carvedilol 12.5 mg oral tablet: 1 tab(s) orally every 12 hours  cloNIDine 0.1 mg oral tablet: 1 tab(s) orally 2 times a day  clopidogrel 75 mg oral tablet: 1 tab(s) orally once a day  hydrALAZINE 100 mg oral tablet: 1 tab(s) orally 2 times a day  isosorbide mononitrate 60 mg oral tablet, extended release: 1 tab(s) orally once a day  levETIRAcetam 500 mg oral tablet: 1 tab(s) orally 2 times a day  pantoprazole 40 mg oral delayed release tablet: 1 tab(s) orally once a day (before a meal)  risankizumab: every 3 weeks  sevelamer carbonate 800 mg oral tablet: 2 tab(s) orally 3 times a day (with meals)  tamsulosin 0.4 mg oral capsule: 1 cap(s) orally once a day (at bedtime)  torsemide 20 mg oral tablet: 1 tab(s) orally every other day

## 2025-01-14 NOTE — PROGRESS NOTE ADULT - SUBJECTIVE AND OBJECTIVE BOX
NEPHROLOGY INTERVAL HPI/OVERNIGHT EVENTS:    No events   Had HD   (-) 5oo ml     MEDICATIONS  (STANDING):  aspirin enteric coated 81 milliGRAM(s) Oral daily  atorvastatin 40 milliGRAM(s) Oral at bedtime  chlorhexidine 2% Cloths 1 Application(s) Topical daily  cloNIDine 0.1 milliGRAM(s) Oral two times a day  epoetin yolanda-epbx (RETACRIT) Injectable 11206 Unit(s) IV Push <User Schedule>  guaifenesin/dextromethorphan Oral Liquid 10 milliLiter(s) Oral every 4 hours  heparin   Injectable 5000 Unit(s) SubCutaneous every 12 hours  hydrALAZINE 50 milliGRAM(s) Oral two times a day  isosorbide   mononitrate ER Tablet (IMDUR) 60 milliGRAM(s) Oral daily  levalbuterol Inhalation 0.63 milliGRAM(s) Inhalation every 6 hours  levETIRAcetam   Injectable 500 milliGRAM(s) IV Push every 12 hours  methylPREDNISolone sodium succinate Injectable 40 milliGRAM(s) IV Push two times a day  metoprolol tartrate 50 milliGRAM(s) Oral three times a day  pantoprazole    Tablet 40 milliGRAM(s) Oral before breakfast  senna 2 Tablet(s) Oral at bedtime  sevelamer carbonate 1600 milliGRAM(s) Oral three times a day with meals  sodium zirconium cyclosilicate 10 Gram(s) Oral once  tamsulosin 0.4 milliGRAM(s) Oral at bedtime    MEDICATIONS  (PRN):  acetaminophen     Tablet .. 650 milliGRAM(s) Oral every 6 hours PRN Temp greater or equal to 38C (100.4F), Mild Pain (1 - 3)  aluminum hydroxide/magnesium hydroxide/simethicone Suspension 30 milliLiter(s) Oral every 4 hours PRN Dyspepsia  benzonatate 100 milliGRAM(s) Oral every 8 hours PRN Cough  diltiazem Injectable 10 milliGRAM(s) IV Push every 6 hours PRN HR >120s  LORazepam   Injectable 0.2 milliGRAM(s) IV Push every 12 hours PRN for severe nausea and vomiting  melatonin 3 milliGRAM(s) Oral at bedtime PRN Insomnia  metoclopramide Injectable 10 milliGRAM(s) IV Push every 6 hours PRN nausea vomiting  ondansetron Injectable 4 milliGRAM(s) IV Push every 6 hours PRN Nausea and/or Vomiting      Allergies    penicillin (Other)    Intolerances          Vital Signs Last 24 Hrs  T(C): 36.6 (2025 04:35), Max: 36.7 (2025 17:25)  T(F): 97.9 (2025 04:35), Max: 98 (2025 17:25)  HR: 96 (2025 04:35) (64 - 108)  BP: 121/66 (2025 04:35) (121/66 - 156/83)  BP(mean): --  RR: 18 (2025 04:52) (17 - 18)  SpO2: 100% (2025 04:52) (90% - 100%)    Parameters below as of 2025 04:52  Patient On (Oxygen Delivery Method): room air      Daily     Daily Weight in k.8 (2025 04:52)  I&O's Detail    I&O's Summary        PHYSICAL EXAM:  HEAD:  Atraumatic, Normocephalic , no edema   NECK: Supple, No JVD,   NERVOUS SYSTEM:  Alert & Oriented X3,  CHEST/LUNG: EAE , No wheeze   HEART: tachy / regular   ABDOMEN: Soft, Nontender, Nondistended; Bowel sounds present  EXTREMITIES:  no sig edema . L access w/ thrill   LABS:                        8.7    6.64  )-----------( 102      ( 2025 05:20 )             25.8     01-14    140  |  99  |  68.1[H]  ----------------------------<  170[H]  4.2   |  24.0  |  5.71[H]    Ca    8.1[L]      2025 05:20  Phos  4.6     -14  Mg     2.3     01-14        Urinalysis Basic - ( 2025 05:20 )    Color: x / Appearance: x / SG: x / pH: x  Gluc: 170 mg/dL / Ketone: x  / Bili: x / Urobili: x   Blood: x / Protein: x / Nitrite: x   Leuk Esterase: x / RBC: x / WBC x   Sq Epi: x / Non Sq Epi: x / Bacteria: x      Magnesium: 2.3 mg/dL ( @ 05:20)  Phosphorus: 4.6 mg/dL ( @ 05:20)          RADIOLOGY & ADDITIONAL TESTS:

## 2025-01-16 ENCOUNTER — INPATIENT (INPATIENT)
Facility: HOSPITAL | Age: 65
LOS: 1 days | Discharge: HOME | DRG: 684 | End: 2025-01-18
Attending: STUDENT IN AN ORGANIZED HEALTH CARE EDUCATION/TRAINING PROGRAM | Admitting: HOSPITALIST
Payer: COMMERCIAL

## 2025-01-16 VITALS
WEIGHT: 130.07 LBS | HEART RATE: 103 BPM | OXYGEN SATURATION: 96 % | SYSTOLIC BLOOD PRESSURE: 181 MMHG | TEMPERATURE: 98 F | RESPIRATION RATE: 20 BRPM | DIASTOLIC BLOOD PRESSURE: 80 MMHG

## 2025-01-16 DIAGNOSIS — I77.0 ARTERIOVENOUS FISTULA, ACQUIRED: Chronic | ICD-10-CM

## 2025-01-16 DIAGNOSIS — Z86.79 PERSONAL HISTORY OF OTHER DISEASES OF THE CIRCULATORY SYSTEM: Chronic | ICD-10-CM

## 2025-01-16 DIAGNOSIS — Z90.49 ACQUIRED ABSENCE OF OTHER SPECIFIED PARTS OF DIGESTIVE TRACT: Chronic | ICD-10-CM

## 2025-01-16 DIAGNOSIS — Z95.2 PRESENCE OF PROSTHETIC HEART VALVE: Chronic | ICD-10-CM

## 2025-01-16 DIAGNOSIS — Z95.818 PRESENCE OF OTHER CARDIAC IMPLANTS AND GRAFTS: Chronic | ICD-10-CM

## 2025-01-16 DIAGNOSIS — Z95.1 PRESENCE OF AORTOCORONARY BYPASS GRAFT: Chronic | ICD-10-CM

## 2025-01-16 DIAGNOSIS — Z94.0 KIDNEY TRANSPLANT STATUS: Chronic | ICD-10-CM

## 2025-01-16 PROCEDURE — 93010 ELECTROCARDIOGRAM REPORT: CPT

## 2025-01-16 PROCEDURE — 99285 EMERGENCY DEPT VISIT HI MDM: CPT

## 2025-01-17 DIAGNOSIS — N18.6 END STAGE RENAL DISEASE: ICD-10-CM

## 2025-01-17 LAB
ALBUMIN SERPL ELPH-MCNC: 3.7 G/DL — SIGNIFICANT CHANGE UP (ref 3.3–5.2)
ALP SERPL-CCNC: 102 U/L — SIGNIFICANT CHANGE UP (ref 40–120)
ALT FLD-CCNC: 21 U/L — SIGNIFICANT CHANGE UP
ANION GAP SERPL CALC-SCNC: 18 MMOL/L — HIGH (ref 5–17)
AST SERPL-CCNC: 16 U/L — SIGNIFICANT CHANGE UP
BASOPHILS # BLD AUTO: 0 K/UL — SIGNIFICANT CHANGE UP (ref 0–0.2)
BASOPHILS NFR BLD AUTO: 0 % — SIGNIFICANT CHANGE UP (ref 0–2)
BILIRUB SERPL-MCNC: 1.1 MG/DL — SIGNIFICANT CHANGE UP (ref 0.4–2)
BUN SERPL-MCNC: 86.5 MG/DL — HIGH (ref 8–20)
CALCIUM SERPL-MCNC: 7.8 MG/DL — LOW (ref 8.4–10.5)
CHLORIDE SERPL-SCNC: 96 MMOL/L — SIGNIFICANT CHANGE UP (ref 96–108)
CO2 SERPL-SCNC: 24 MMOL/L — SIGNIFICANT CHANGE UP (ref 22–29)
CREAT SERPL-MCNC: 7.41 MG/DL — HIGH (ref 0.5–1.3)
EGFR: 8 ML/MIN/1.73M2 — LOW
EGFR: 8 ML/MIN/1.73M2 — LOW
EOSINOPHIL # BLD AUTO: 0.28 K/UL — SIGNIFICANT CHANGE UP (ref 0–0.5)
EOSINOPHIL NFR BLD AUTO: 2.6 % — SIGNIFICANT CHANGE UP (ref 0–6)
GLUCOSE SERPL-MCNC: 91 MG/DL — SIGNIFICANT CHANGE UP (ref 70–99)
HCT VFR BLD CALC: 31.2 % — LOW (ref 39–50)
HGB BLD-MCNC: 10.2 G/DL — LOW (ref 13–17)
IMM GRANULOCYTES NFR BLD AUTO: 0.5 % — SIGNIFICANT CHANGE UP (ref 0–0.9)
LYMPHOCYTES # BLD AUTO: 1.19 K/UL — SIGNIFICANT CHANGE UP (ref 1–3.3)
LYMPHOCYTES # BLD AUTO: 11 % — LOW (ref 13–44)
MCHC RBC-ENTMCNC: 30.5 PG — SIGNIFICANT CHANGE UP (ref 27–34)
MCHC RBC-ENTMCNC: 32.7 G/DL — SIGNIFICANT CHANGE UP (ref 32–36)
MCV RBC AUTO: 93.4 FL — SIGNIFICANT CHANGE UP (ref 80–100)
MONOCYTES # BLD AUTO: 1.03 K/UL — HIGH (ref 0–0.9)
MONOCYTES NFR BLD AUTO: 9.5 % — SIGNIFICANT CHANGE UP (ref 2–14)
NEUTROPHILS # BLD AUTO: 8.24 K/UL — HIGH (ref 1.8–7.4)
NEUTROPHILS NFR BLD AUTO: 76.4 % — SIGNIFICANT CHANGE UP (ref 43–77)
PLATELET # BLD AUTO: 167 K/UL — SIGNIFICANT CHANGE UP (ref 150–400)
POTASSIUM SERPL-MCNC: 3.8 MMOL/L — SIGNIFICANT CHANGE UP (ref 3.5–5.3)
POTASSIUM SERPL-SCNC: 3.8 MMOL/L — SIGNIFICANT CHANGE UP (ref 3.5–5.3)
PROT SERPL-MCNC: 6.9 G/DL — SIGNIFICANT CHANGE UP (ref 6.6–8.7)
RAPID RVP RESULT: SIGNIFICANT CHANGE UP
RBC # BLD: 3.34 M/UL — LOW (ref 4.2–5.8)
RBC # FLD: 16.3 % — HIGH (ref 10.3–14.5)
SARS-COV-2 RNA SPEC QL NAA+PROBE: SIGNIFICANT CHANGE UP
SODIUM SERPL-SCNC: 138 MMOL/L — SIGNIFICANT CHANGE UP (ref 135–145)
WBC # BLD: 10.79 K/UL — HIGH (ref 3.8–10.5)
WBC # FLD AUTO: 10.79 K/UL — HIGH (ref 3.8–10.5)

## 2025-01-17 PROCEDURE — 99223 1ST HOSP IP/OBS HIGH 75: CPT | Mod: GC

## 2025-01-17 PROCEDURE — 71250 CT THORAX DX C-: CPT | Mod: 26

## 2025-01-17 PROCEDURE — 86077 PHYS BLOOD BANK SERV XMATCH: CPT

## 2025-01-17 PROCEDURE — 71045 X-RAY EXAM CHEST 1 VIEW: CPT | Mod: 26

## 2025-01-17 RX ORDER — HYDROMORPHONE/SOD CHLOR,ISO/PF 2 MG/10 ML
2 SYRINGE (ML) INJECTION ONCE
Refills: 0 | Status: DISCONTINUED | OUTPATIENT
Start: 2025-01-17 | End: 2025-01-17

## 2025-01-17 RX ORDER — ISOSORBIDE MONONITRATE 60 MG/1
60 TABLET, EXTENDED RELEASE ORAL DAILY
Refills: 0 | Status: DISCONTINUED | OUTPATIENT
Start: 2025-01-17 | End: 2025-01-18

## 2025-01-17 RX ORDER — MAGNESIUM, ALUMINUM HYDROXIDE 200-200 MG
30 TABLET,CHEWABLE ORAL ONCE
Refills: 0 | Status: COMPLETED | OUTPATIENT
Start: 2025-01-17 | End: 2025-01-17

## 2025-01-17 RX ORDER — CARVEDILOL 3.12 MG/1
12.5 TABLET, FILM COATED ORAL EVERY 12 HOURS
Refills: 0 | Status: DISCONTINUED | OUTPATIENT
Start: 2025-01-17 | End: 2025-01-18

## 2025-01-17 RX ORDER — IPRATROPIUM BROMIDE AND ALBUTEROL SULFATE .5; 2.5 MG/3ML; MG/3ML
3 SOLUTION RESPIRATORY (INHALATION) ONCE
Refills: 0 | Status: COMPLETED | OUTPATIENT
Start: 2025-01-17 | End: 2025-01-17

## 2025-01-17 RX ORDER — TAMSULOSIN HYDROCHLORIDE 0.4 MG/1
0.4 CAPSULE ORAL AT BEDTIME
Refills: 0 | Status: DISCONTINUED | OUTPATIENT
Start: 2025-01-17 | End: 2025-01-18

## 2025-01-17 RX ORDER — ASPIRIN 325 MG
81 TABLET ORAL DAILY
Refills: 0 | Status: DISCONTINUED | OUTPATIENT
Start: 2025-01-17 | End: 2025-01-18

## 2025-01-17 RX ORDER — AMLODIPINE BESYLATE 10 MG/1
5 TABLET ORAL DAILY
Refills: 0 | Status: DISCONTINUED | OUTPATIENT
Start: 2025-01-17 | End: 2025-01-18

## 2025-01-17 RX ORDER — ATORVASTATIN CALCIUM 80 MG/1
40 TABLET, FILM COATED ORAL AT BEDTIME
Refills: 0 | Status: DISCONTINUED | OUTPATIENT
Start: 2025-01-17 | End: 2025-01-18

## 2025-01-17 RX ORDER — DOXYCYCLINE HYCLATE 100 MG
100 TABLET ORAL EVERY 12 HOURS
Refills: 0 | Status: DISCONTINUED | OUTPATIENT
Start: 2025-01-17 | End: 2025-01-18

## 2025-01-17 RX ORDER — ACETAMINOPHEN 500 MG/5ML
1000 LIQUID (ML) ORAL ONCE
Refills: 0 | Status: COMPLETED | OUTPATIENT
Start: 2025-01-17 | End: 2025-01-17

## 2025-01-17 RX ORDER — CLOPIDOGREL BISULFATE 75 MG/1
75 TABLET, FILM COATED ORAL DAILY
Refills: 0 | Status: DISCONTINUED | OUTPATIENT
Start: 2025-01-17 | End: 2025-01-18

## 2025-01-17 RX ORDER — ACETAMINOPHEN 500 MG/5ML
650 LIQUID (ML) ORAL EVERY 6 HOURS
Refills: 0 | Status: DISCONTINUED | OUTPATIENT
Start: 2025-01-17 | End: 2025-01-18

## 2025-01-17 RX ORDER — LEVETIRACETAM 10 MG/ML
500 INJECTION, SOLUTION INTRAVENOUS
Refills: 0 | Status: DISCONTINUED | OUTPATIENT
Start: 2025-01-17 | End: 2025-01-18

## 2025-01-17 RX ADMIN — Medication 81 MILLIGRAM(S): at 18:35

## 2025-01-17 RX ADMIN — CARVEDILOL 12.5 MILLIGRAM(S): 3.12 TABLET, FILM COATED ORAL at 06:23

## 2025-01-17 RX ADMIN — IPRATROPIUM BROMIDE AND ALBUTEROL SULFATE 3 MILLILITER(S): .5; 2.5 SOLUTION RESPIRATORY (INHALATION) at 00:49

## 2025-01-17 RX ADMIN — LEVETIRACETAM 500 MILLIGRAM(S): 10 INJECTION, SOLUTION INTRAVENOUS at 06:24

## 2025-01-17 RX ADMIN — Medication 2 MILLIGRAM(S): at 04:21

## 2025-01-17 RX ADMIN — Medication 0.1 MILLIGRAM(S): at 18:32

## 2025-01-17 RX ADMIN — Medication 1000 MILLIGRAM(S): at 06:24

## 2025-01-17 RX ADMIN — Medication 40 MILLIGRAM(S): at 06:23

## 2025-01-17 RX ADMIN — Medication 75 MILLIGRAM(S): at 18:32

## 2025-01-17 RX ADMIN — TAMSULOSIN HYDROCHLORIDE 0.4 MILLIGRAM(S): 0.4 CAPSULE ORAL at 22:24

## 2025-01-17 RX ADMIN — Medication 100 MILLIGRAM(S): at 06:24

## 2025-01-17 RX ADMIN — Medication 1 APPLICATION(S): at 18:34

## 2025-01-17 RX ADMIN — Medication 100 MILLIGRAM(S): at 18:36

## 2025-01-17 RX ADMIN — Medication 2 MILLIGRAM(S): at 06:24

## 2025-01-17 RX ADMIN — Medication 40 MILLIGRAM(S): at 02:02

## 2025-01-17 RX ADMIN — Medication 3 MILLILITER(S): at 06:33

## 2025-01-17 RX ADMIN — ATORVASTATIN CALCIUM 40 MILLIGRAM(S): 80 TABLET, FILM COATED ORAL at 22:24

## 2025-01-17 RX ADMIN — CARVEDILOL 12.5 MILLIGRAM(S): 3.12 TABLET, FILM COATED ORAL at 18:32

## 2025-01-17 RX ADMIN — Medication 400 MILLIGRAM(S): at 02:02

## 2025-01-17 RX ADMIN — CLOPIDOGREL BISULFATE 75 MILLIGRAM(S): 75 TABLET, FILM COATED ORAL at 18:34

## 2025-01-17 RX ADMIN — LEVETIRACETAM 500 MILLIGRAM(S): 10 INJECTION, SOLUTION INTRAVENOUS at 18:32

## 2025-01-17 RX ADMIN — Medication 1000 MILLILITER(S): at 04:22

## 2025-01-17 RX ADMIN — Medication 3 MILLILITER(S): at 22:22

## 2025-01-17 RX ADMIN — Medication 20 MILLIGRAM(S): at 02:56

## 2025-01-17 RX ADMIN — AMLODIPINE BESYLATE 5 MILLIGRAM(S): 10 TABLET ORAL at 06:23

## 2025-01-17 RX ADMIN — Medication 0.1 MILLIGRAM(S): at 06:24

## 2025-01-18 ENCOUNTER — TRANSCRIPTION ENCOUNTER (OUTPATIENT)
Age: 65
End: 2025-01-18

## 2025-01-18 VITALS
TEMPERATURE: 99 F | DIASTOLIC BLOOD PRESSURE: 52 MMHG | SYSTOLIC BLOOD PRESSURE: 101 MMHG | OXYGEN SATURATION: 97 % | RESPIRATION RATE: 17 BRPM | HEART RATE: 102 BPM

## 2025-01-18 PROCEDURE — 36415 COLL VENOUS BLD VENIPUNCTURE: CPT

## 2025-01-18 PROCEDURE — 71045 X-RAY EXAM CHEST 1 VIEW: CPT

## 2025-01-18 PROCEDURE — 94640 AIRWAY INHALATION TREATMENT: CPT

## 2025-01-18 PROCEDURE — 99285 EMERGENCY DEPT VISIT HI MDM: CPT | Mod: 25

## 2025-01-18 PROCEDURE — 99261: CPT

## 2025-01-18 PROCEDURE — 96365 THER/PROPH/DIAG IV INF INIT: CPT

## 2025-01-18 PROCEDURE — 99239 HOSP IP/OBS DSCHRG MGMT >30: CPT

## 2025-01-18 PROCEDURE — 90935 HEMODIALYSIS ONE EVALUATION: CPT

## 2025-01-18 PROCEDURE — 36410 VNPNXR 3YR/> PHY/QHP DX/THER: CPT

## 2025-01-18 PROCEDURE — 80053 COMPREHEN METABOLIC PANEL: CPT

## 2025-01-18 PROCEDURE — 85025 COMPLETE CBC W/AUTO DIFF WBC: CPT

## 2025-01-18 PROCEDURE — 96375 TX/PRO/DX INJ NEW DRUG ADDON: CPT

## 2025-01-18 PROCEDURE — 93005 ELECTROCARDIOGRAM TRACING: CPT

## 2025-01-18 PROCEDURE — 0225U NFCT DS DNA&RNA 21 SARSCOV2: CPT

## 2025-01-18 PROCEDURE — 71250 CT THORAX DX C-: CPT | Mod: MC

## 2025-01-18 RX ORDER — LORAZEPAM 4 MG/ML
1 VIAL (ML) INJECTION AT BEDTIME
Refills: 0 | Status: DISCONTINUED | OUTPATIENT
Start: 2025-01-18 | End: 2025-01-18

## 2025-01-18 RX ORDER — OXYCODONE HYDROCHLORIDE AND ACETAMINOPHEN 10; 325 MG/1; MG/1
1 TABLET ORAL
Qty: 9 | Refills: 0
Start: 2025-01-18 | End: 2025-01-20

## 2025-01-18 RX ORDER — DOXYCYCLINE HYCLATE 100 MG
2 TABLET ORAL
Qty: 20 | Refills: 0
Start: 2025-01-18 | End: 2025-01-22

## 2025-01-18 RX ADMIN — Medication 81 MILLIGRAM(S): at 13:53

## 2025-01-18 RX ADMIN — Medication 0.1 MILLIGRAM(S): at 05:47

## 2025-01-18 RX ADMIN — CLOPIDOGREL BISULFATE 75 MILLIGRAM(S): 75 TABLET, FILM COATED ORAL at 13:53

## 2025-01-18 RX ADMIN — Medication 75 MILLIGRAM(S): at 05:48

## 2025-01-18 RX ADMIN — CARVEDILOL 12.5 MILLIGRAM(S): 3.12 TABLET, FILM COATED ORAL at 05:47

## 2025-01-18 RX ADMIN — Medication 1 APPLICATION(S): at 13:53

## 2025-01-18 RX ADMIN — Medication 100 MILLIGRAM(S): at 17:22

## 2025-01-18 RX ADMIN — LEVETIRACETAM 500 MILLIGRAM(S): 10 INJECTION, SOLUTION INTRAVENOUS at 05:47

## 2025-01-18 RX ADMIN — ISOSORBIDE MONONITRATE 60 MILLIGRAM(S): 60 TABLET, EXTENDED RELEASE ORAL at 13:52

## 2025-01-18 RX ADMIN — LEVETIRACETAM 500 MILLIGRAM(S): 10 INJECTION, SOLUTION INTRAVENOUS at 17:22

## 2025-01-18 RX ADMIN — Medication 40 MILLIGRAM(S): at 05:47

## 2025-01-18 RX ADMIN — Medication 100 MILLIGRAM(S): at 05:46

## 2025-01-18 RX ADMIN — AMLODIPINE BESYLATE 5 MILLIGRAM(S): 10 TABLET ORAL at 05:47

## 2025-01-18 RX ADMIN — Medication 1 MILLIGRAM(S): at 01:02

## 2025-01-18 RX ADMIN — Medication 3 MILLILITER(S): at 05:48

## 2025-01-24 NOTE — DISCHARGE NOTE PROVIDER - NSDCADMDATE_GEN_ALL_CORE_FT
Spoke with mom. Patient is currently at [facility]'s house because he had a slight fever so stayed home from school. [facility] just called indicating that patient was scratched on the left lower eyelid by grandma's cat. Area is no longer bleeding. Mom does not think the eyeball is affected- this appears normal. Vision is normal. Patient does not seem to be in significant pain. Scratch is not gaping but mom thinks a little deeper than superficial. It is appox 1/4 the length of th lower eyelid. There is another scratch by his nose.     Pics received. Does not appear to need suturing. Dr. Strauss- Urgent care for ABX?            29-Jan-2021 20:42

## 2025-02-13 ENCOUNTER — APPOINTMENT (OUTPATIENT)
Dept: CARDIOLOGY | Facility: CLINIC | Age: 65
End: 2025-02-13
Payer: MEDICARE

## 2025-02-13 VITALS
HEART RATE: 84 BPM | WEIGHT: 142 LBS | BODY MASS INDEX: 21.52 KG/M2 | HEIGHT: 68 IN | DIASTOLIC BLOOD PRESSURE: 70 MMHG | SYSTOLIC BLOOD PRESSURE: 142 MMHG | OXYGEN SATURATION: 98 %

## 2025-02-13 DIAGNOSIS — Z98.890 OTHER SPECIFIED POSTPROCEDURAL STATES: ICD-10-CM

## 2025-02-13 DIAGNOSIS — K92.1 MELENA: ICD-10-CM

## 2025-02-13 DIAGNOSIS — Z86.79 OTHER SPECIFIED POSTPROCEDURAL STATES: ICD-10-CM

## 2025-02-13 DIAGNOSIS — I10 ESSENTIAL (PRIMARY) HYPERTENSION: ICD-10-CM

## 2025-02-13 DIAGNOSIS — I42.9 CARDIOMYOPATHY, UNSPECIFIED: ICD-10-CM

## 2025-02-13 DIAGNOSIS — Z95.818 PRESENCE OF OTHER CARDIAC IMPLANTS AND GRAFTS: ICD-10-CM

## 2025-02-13 DIAGNOSIS — N18.9 CHRONIC KIDNEY DISEASE, UNSPECIFIED: ICD-10-CM

## 2025-02-13 DIAGNOSIS — I48.91 UNSPECIFIED ATRIAL FIBRILLATION: ICD-10-CM

## 2025-02-13 PROCEDURE — 93000 ELECTROCARDIOGRAM COMPLETE: CPT

## 2025-02-13 PROCEDURE — 99215 OFFICE O/P EST HI 40 MIN: CPT

## 2025-02-16 ENCOUNTER — INPATIENT (INPATIENT)
Facility: HOSPITAL | Age: 65
LOS: 0 days | Discharge: HOME | DRG: 189 | End: 2025-02-17
Attending: STUDENT IN AN ORGANIZED HEALTH CARE EDUCATION/TRAINING PROGRAM | Admitting: STUDENT IN AN ORGANIZED HEALTH CARE EDUCATION/TRAINING PROGRAM
Payer: MEDICARE

## 2025-02-16 VITALS
OXYGEN SATURATION: 96 % | DIASTOLIC BLOOD PRESSURE: 104 MMHG | RESPIRATION RATE: 20 BRPM | SYSTOLIC BLOOD PRESSURE: 185 MMHG | WEIGHT: 137.57 LBS | HEART RATE: 132 BPM | HEIGHT: 66 IN | TEMPERATURE: 98 F

## 2025-02-16 DIAGNOSIS — Z95.818 PRESENCE OF OTHER CARDIAC IMPLANTS AND GRAFTS: Chronic | ICD-10-CM

## 2025-02-16 DIAGNOSIS — Z90.49 ACQUIRED ABSENCE OF OTHER SPECIFIED PARTS OF DIGESTIVE TRACT: Chronic | ICD-10-CM

## 2025-02-16 DIAGNOSIS — Z95.2 PRESENCE OF PROSTHETIC HEART VALVE: Chronic | ICD-10-CM

## 2025-02-16 DIAGNOSIS — J96.01 ACUTE RESPIRATORY FAILURE WITH HYPOXIA: ICD-10-CM

## 2025-02-16 DIAGNOSIS — I77.0 ARTERIOVENOUS FISTULA, ACQUIRED: Chronic | ICD-10-CM

## 2025-02-16 DIAGNOSIS — R79.89 OTHER SPECIFIED ABNORMAL FINDINGS OF BLOOD CHEMISTRY: ICD-10-CM

## 2025-02-16 DIAGNOSIS — Z95.1 PRESENCE OF AORTOCORONARY BYPASS GRAFT: Chronic | ICD-10-CM

## 2025-02-16 DIAGNOSIS — Z94.0 KIDNEY TRANSPLANT STATUS: Chronic | ICD-10-CM

## 2025-02-16 DIAGNOSIS — Z86.79 PERSONAL HISTORY OF OTHER DISEASES OF THE CIRCULATORY SYSTEM: Chronic | ICD-10-CM

## 2025-02-16 LAB
ALBUMIN SERPL ELPH-MCNC: 4.5 G/DL — SIGNIFICANT CHANGE UP (ref 3.3–5.2)
ALP SERPL-CCNC: 93 U/L — SIGNIFICANT CHANGE UP (ref 40–120)
ALT FLD-CCNC: 10 U/L — SIGNIFICANT CHANGE UP
ANION GAP SERPL CALC-SCNC: 20 MMOL/L — HIGH (ref 5–17)
AST SERPL-CCNC: 17 U/L — SIGNIFICANT CHANGE UP
BASOPHILS # BLD AUTO: 0.06 K/UL — SIGNIFICANT CHANGE UP (ref 0–0.2)
BASOPHILS NFR BLD AUTO: 0.6 % — SIGNIFICANT CHANGE UP (ref 0–2)
BILIRUB SERPL-MCNC: 1.9 MG/DL — SIGNIFICANT CHANGE UP (ref 0.4–2)
BUN SERPL-MCNC: 79.3 MG/DL — HIGH (ref 8–20)
CALCIUM SERPL-MCNC: 9.4 MG/DL — SIGNIFICANT CHANGE UP (ref 8.4–10.5)
CHLORIDE SERPL-SCNC: 104 MMOL/L — SIGNIFICANT CHANGE UP (ref 96–108)
CO2 SERPL-SCNC: 20 MMOL/L — LOW (ref 22–29)
CREAT SERPL-MCNC: 7.3 MG/DL — HIGH (ref 0.5–1.3)
EGFR: 8 ML/MIN/1.73M2 — LOW
EOSINOPHIL # BLD AUTO: 0 K/UL — SIGNIFICANT CHANGE UP (ref 0–0.5)
EOSINOPHIL NFR BLD AUTO: 0 % — SIGNIFICANT CHANGE UP (ref 0–6)
FLUAV AG NPH QL: SIGNIFICANT CHANGE UP
FLUBV AG NPH QL: SIGNIFICANT CHANGE UP
GAS PNL BLDV: SIGNIFICANT CHANGE UP
GLUCOSE SERPL-MCNC: 104 MG/DL — HIGH (ref 70–99)
HCT VFR BLD CALC: 33 % — LOW (ref 39–50)
HGB BLD-MCNC: 10.4 G/DL — LOW (ref 13–17)
IMM GRANULOCYTES # BLD AUTO: 0.07 K/UL — SIGNIFICANT CHANGE UP (ref 0–0.07)
IMM GRANULOCYTES NFR BLD AUTO: 0.7 % — SIGNIFICANT CHANGE UP (ref 0–0.9)
LYMPHOCYTES # BLD AUTO: 0.68 K/UL — LOW (ref 1–3.3)
LYMPHOCYTES NFR BLD AUTO: 7 % — LOW (ref 13–44)
MAGNESIUM SERPL-MCNC: 1.9 MG/DL — SIGNIFICANT CHANGE UP (ref 1.8–2.6)
MCHC RBC-ENTMCNC: 31.5 G/DL — LOW (ref 32–36)
MCHC RBC-ENTMCNC: 31.5 PG — SIGNIFICANT CHANGE UP (ref 27–34)
MCV RBC AUTO: 100 FL — SIGNIFICANT CHANGE UP (ref 80–100)
MONOCYTES # BLD AUTO: 0.63 K/UL — SIGNIFICANT CHANGE UP (ref 0–0.9)
MONOCYTES NFR BLD AUTO: 6.4 % — SIGNIFICANT CHANGE UP (ref 2–14)
MRSA PCR RESULT.: SIGNIFICANT CHANGE UP
NEUTROPHILS # BLD AUTO: 8.34 K/UL — HIGH (ref 1.8–7.4)
NEUTROPHILS NFR BLD AUTO: 85.3 % — HIGH (ref 43–77)
NRBC # BLD AUTO: 0 K/UL — SIGNIFICANT CHANGE UP (ref 0–0)
NRBC # FLD: 0 K/UL — SIGNIFICANT CHANGE UP (ref 0–0)
NRBC BLD AUTO-RTO: 0 /100 WBCS — SIGNIFICANT CHANGE UP (ref 0–0)
NT-PROBNP SERPL-SCNC: HIGH PG/ML (ref 0–300)
PHOSPHATE SERPL-MCNC: 6.5 MG/DL — HIGH (ref 2.4–4.7)
PLATELET # BLD AUTO: 116 K/UL — LOW (ref 150–400)
PMV BLD: 10.4 FL — SIGNIFICANT CHANGE UP (ref 7–13)
POTASSIUM SERPL-MCNC: 4.4 MMOL/L — SIGNIFICANT CHANGE UP (ref 3.5–5.3)
POTASSIUM SERPL-SCNC: 4.4 MMOL/L — SIGNIFICANT CHANGE UP (ref 3.5–5.3)
PROT SERPL-MCNC: 7.6 G/DL — SIGNIFICANT CHANGE UP (ref 6.6–8.7)
RAPID RVP RESULT: SIGNIFICANT CHANGE UP
RBC # BLD: 3.3 M/UL — LOW (ref 4.2–5.8)
RBC # FLD: 16.7 % — HIGH (ref 10.3–14.5)
RSV RNA NPH QL NAA+NON-PROBE: SIGNIFICANT CHANGE UP
S AUREUS DNA NOSE QL NAA+PROBE: SIGNIFICANT CHANGE UP
SARS-COV-2 RNA SPEC QL NAA+PROBE: SIGNIFICANT CHANGE UP
SARS-COV-2 RNA SPEC QL NAA+PROBE: SIGNIFICANT CHANGE UP
SODIUM SERPL-SCNC: 144 MMOL/L — SIGNIFICANT CHANGE UP (ref 135–145)
TROPONIN T, HIGH SENSITIVITY RESULT: 119 NG/L — HIGH (ref 0–51)
WBC # BLD: 9.78 K/UL — SIGNIFICANT CHANGE UP (ref 3.8–10.5)
WBC # FLD AUTO: 9.78 K/UL — SIGNIFICANT CHANGE UP (ref 3.8–10.5)

## 2025-02-16 PROCEDURE — 99223 1ST HOSP IP/OBS HIGH 75: CPT

## 2025-02-16 PROCEDURE — 71045 X-RAY EXAM CHEST 1 VIEW: CPT | Mod: 26

## 2025-02-16 PROCEDURE — 93010 ELECTROCARDIOGRAM REPORT: CPT

## 2025-02-16 PROCEDURE — 99285 EMERGENCY DEPT VISIT HI MDM: CPT | Mod: GC

## 2025-02-16 PROCEDURE — 99222 1ST HOSP IP/OBS MODERATE 55: CPT

## 2025-02-16 PROCEDURE — 71275 CT ANGIOGRAPHY CHEST: CPT | Mod: 26

## 2025-02-16 RX ORDER — AZITHROMYCIN 250 MG
500 CAPSULE ORAL ONCE
Refills: 0 | Status: COMPLETED | OUTPATIENT
Start: 2025-02-16 | End: 2025-02-16

## 2025-02-16 RX ORDER — METOPROLOL SUCCINATE 50 MG/1
5 TABLET, EXTENDED RELEASE ORAL ONCE
Refills: 0 | Status: COMPLETED | OUTPATIENT
Start: 2025-02-16 | End: 2025-02-16

## 2025-02-16 RX ORDER — ONDANSETRON HCL/PF 4 MG/2 ML
4 VIAL (ML) INJECTION ONCE
Refills: 0 | Status: COMPLETED | OUTPATIENT
Start: 2025-02-16 | End: 2025-02-16

## 2025-02-16 RX ORDER — CEFEPIME 2 G/20ML
1000 INJECTION, POWDER, FOR SOLUTION INTRAVENOUS EVERY 24 HOURS
Refills: 0 | Status: DISCONTINUED | OUTPATIENT
Start: 2025-02-16 | End: 2025-02-17

## 2025-02-16 RX ORDER — BENZONATATE 100 MG
100 CAPSULE ORAL EVERY 8 HOURS
Refills: 0 | Status: DISCONTINUED | OUTPATIENT
Start: 2025-02-16 | End: 2025-02-17

## 2025-02-16 RX ORDER — TORSEMIDE 10 MG
20 TABLET ORAL DAILY
Refills: 0 | Status: DISCONTINUED | OUTPATIENT
Start: 2025-02-16 | End: 2025-02-17

## 2025-02-16 RX ORDER — MAGNESIUM, ALUMINUM HYDROXIDE 200-200 MG
30 TABLET,CHEWABLE ORAL EVERY 4 HOURS
Refills: 0 | Status: DISCONTINUED | OUTPATIENT
Start: 2025-02-16 | End: 2025-02-17

## 2025-02-16 RX ORDER — CARVEDILOL 3.12 MG/1
6.25 TABLET, FILM COATED ORAL EVERY 12 HOURS
Refills: 0 | Status: DISCONTINUED | OUTPATIENT
Start: 2025-02-16 | End: 2025-02-17

## 2025-02-16 RX ORDER — AMLODIPINE BESYLATE 10 MG/1
10 TABLET ORAL DAILY
Refills: 0 | Status: DISCONTINUED | OUTPATIENT
Start: 2025-02-16 | End: 2025-02-17

## 2025-02-16 RX ORDER — CEFTRIAXONE 500 MG/1
1000 INJECTION, POWDER, FOR SOLUTION INTRAMUSCULAR; INTRAVENOUS ONCE
Refills: 0 | Status: DISCONTINUED | OUTPATIENT
Start: 2025-02-16 | End: 2025-02-16

## 2025-02-16 RX ORDER — DEXTROMETHORPHAN HBR, GUAIFENESIN 20; 200 MG/10ML; MG/10ML
10 SOLUTION ORAL EVERY 4 HOURS
Refills: 0 | Status: DISCONTINUED | OUTPATIENT
Start: 2025-02-16 | End: 2025-02-17

## 2025-02-16 RX ORDER — TORSEMIDE 10 MG
1 TABLET ORAL
Refills: 0 | DISCHARGE

## 2025-02-16 RX ORDER — CEFTRIAXONE 500 MG/1
1000 INJECTION, POWDER, FOR SOLUTION INTRAMUSCULAR; INTRAVENOUS ONCE
Refills: 0 | Status: COMPLETED | OUTPATIENT
Start: 2025-02-16 | End: 2025-02-16

## 2025-02-16 RX ORDER — ACETAMINOPHEN 500 MG/5ML
1000 LIQUID (ML) ORAL ONCE
Refills: 0 | Status: COMPLETED | OUTPATIENT
Start: 2025-02-16 | End: 2025-02-16

## 2025-02-16 RX ORDER — ISOSORBIDE MONONITRATE 60 MG/1
60 TABLET, EXTENDED RELEASE ORAL DAILY
Refills: 0 | Status: DISCONTINUED | OUTPATIENT
Start: 2025-02-16 | End: 2025-02-17

## 2025-02-16 RX ORDER — LEVETIRACETAM 10 MG/ML
500 INJECTION, SOLUTION INTRAVENOUS
Refills: 0 | Status: DISCONTINUED | OUTPATIENT
Start: 2025-02-16 | End: 2025-02-17

## 2025-02-16 RX ORDER — CARVEDILOL 3.12 MG/1
12.5 TABLET, FILM COATED ORAL EVERY 12 HOURS
Refills: 0 | Status: DISCONTINUED | OUTPATIENT
Start: 2025-02-16 | End: 2025-02-16

## 2025-02-16 RX ORDER — AMLODIPINE BESYLATE 10 MG/1
1 TABLET ORAL
Refills: 0 | DISCHARGE

## 2025-02-16 RX ORDER — ATORVASTATIN CALCIUM 80 MG/1
40 TABLET, FILM COATED ORAL AT BEDTIME
Refills: 0 | Status: DISCONTINUED | OUTPATIENT
Start: 2025-02-16 | End: 2025-02-17

## 2025-02-16 RX ORDER — CARVEDILOL 3.12 MG/1
1 TABLET, FILM COATED ORAL
Refills: 0 | DISCHARGE

## 2025-02-16 RX ORDER — TAMSULOSIN HYDROCHLORIDE 0.4 MG/1
0.4 CAPSULE ORAL AT BEDTIME
Refills: 0 | Status: DISCONTINUED | OUTPATIENT
Start: 2025-02-16 | End: 2025-02-17

## 2025-02-16 RX ORDER — AMLODIPINE BESYLATE 10 MG/1
5 TABLET ORAL DAILY
Refills: 0 | Status: DISCONTINUED | OUTPATIENT
Start: 2025-02-16 | End: 2025-02-16

## 2025-02-16 RX ORDER — IPRATROPIUM BROMIDE AND ALBUTEROL SULFATE .5; 2.5 MG/3ML; MG/3ML
3 SOLUTION RESPIRATORY (INHALATION) EVERY 6 HOURS
Refills: 0 | Status: DISCONTINUED | OUTPATIENT
Start: 2025-02-16 | End: 2025-02-17

## 2025-02-16 RX ORDER — MELATONIN 5 MG
3 TABLET ORAL AT BEDTIME
Refills: 0 | Status: DISCONTINUED | OUTPATIENT
Start: 2025-02-16 | End: 2025-02-17

## 2025-02-16 RX ORDER — ONDANSETRON HCL/PF 4 MG/2 ML
4 VIAL (ML) INJECTION EVERY 8 HOURS
Refills: 0 | Status: DISCONTINUED | OUTPATIENT
Start: 2025-02-16 | End: 2025-02-17

## 2025-02-16 RX ORDER — VANCOMYCIN HCL IN 5 % DEXTROSE 1.5G/250ML
1000 PLASTIC BAG, INJECTION (ML) INTRAVENOUS ONCE
Refills: 0 | Status: COMPLETED | OUTPATIENT
Start: 2025-02-16 | End: 2025-02-16

## 2025-02-16 RX ORDER — ACETAMINOPHEN 500 MG/5ML
650 LIQUID (ML) ORAL EVERY 6 HOURS
Refills: 0 | Status: DISCONTINUED | OUTPATIENT
Start: 2025-02-16 | End: 2025-02-17

## 2025-02-16 RX ORDER — CEFEPIME 2 G/20ML
INJECTION, POWDER, FOR SOLUTION INTRAVENOUS
Refills: 0 | Status: DISCONTINUED | OUTPATIENT
Start: 2025-02-16 | End: 2025-02-16

## 2025-02-16 RX ORDER — SEVELAMER HYDROCHLORIDE 800 MG/1
1600 TABLET ORAL
Refills: 0 | Status: DISCONTINUED | OUTPATIENT
Start: 2025-02-16 | End: 2025-02-17

## 2025-02-16 RX ADMIN — Medication 100 MILLIGRAM(S): at 17:42

## 2025-02-16 RX ADMIN — Medication 400 MILLIGRAM(S): at 11:04

## 2025-02-16 RX ADMIN — Medication 20 MILLIGRAM(S): at 17:42

## 2025-02-16 RX ADMIN — METOPROLOL SUCCINATE 5 MILLIGRAM(S): 50 TABLET, EXTENDED RELEASE ORAL at 12:21

## 2025-02-16 RX ADMIN — Medication 0.3 MILLIGRAM(S): at 17:41

## 2025-02-16 RX ADMIN — CEFTRIAXONE 1000 MILLIGRAM(S): 500 INJECTION, POWDER, FOR SOLUTION INTRAMUSCULAR; INTRAVENOUS at 12:20

## 2025-02-16 RX ADMIN — TAMSULOSIN HYDROCHLORIDE 0.4 MILLIGRAM(S): 0.4 CAPSULE ORAL at 22:55

## 2025-02-16 RX ADMIN — Medication 250 MILLIGRAM(S): at 12:20

## 2025-02-16 RX ADMIN — Medication 500 MILLIGRAM(S): at 14:24

## 2025-02-16 RX ADMIN — Medication 250 MILLILITER(S): at 14:48

## 2025-02-16 RX ADMIN — Medication 1000 MILLIGRAM(S): at 14:24

## 2025-02-16 RX ADMIN — LEVETIRACETAM 500 MILLIGRAM(S): 10 INJECTION, SOLUTION INTRAVENOUS at 17:41

## 2025-02-16 RX ADMIN — ISOSORBIDE MONONITRATE 60 MILLIGRAM(S): 60 TABLET, EXTENDED RELEASE ORAL at 17:41

## 2025-02-16 RX ADMIN — METOPROLOL SUCCINATE 5 MILLIGRAM(S): 50 TABLET, EXTENDED RELEASE ORAL at 11:04

## 2025-02-16 RX ADMIN — CARVEDILOL 6.25 MILLIGRAM(S): 3.12 TABLET, FILM COATED ORAL at 17:42

## 2025-02-16 RX ADMIN — Medication 250 MILLIGRAM(S): at 17:18

## 2025-02-16 RX ADMIN — Medication 4 MILLIGRAM(S): at 11:26

## 2025-02-16 RX ADMIN — CEFEPIME 1000 MILLIGRAM(S): 2 INJECTION, POWDER, FOR SOLUTION INTRAVENOUS at 17:41

## 2025-02-16 RX ADMIN — ATORVASTATIN CALCIUM 40 MILLIGRAM(S): 80 TABLET, FILM COATED ORAL at 22:55

## 2025-02-16 NOTE — ED ADULT NURSE NOTE - OBJECTIVE STATEMENT
64 yom  sob started a couple days ago here for worsening sob.  body aches, and sweating, unknown if fever. pt is a dialysis pt and missed dialysis yesterday. + nausea,  unknown sick contacts. pt states sob improves with oxygen. diminished lung sounds to lower lobes.

## 2025-02-16 NOTE — H&P ADULT - ASSESSMENT
65 y/o M w/ PMH of ESRD (on HD tu/th/sa), HTN, HLD, Afib (not on AC 2/2 prior GIB now s/p watchman), CAD s/p CABG, aortic and mitral valve replacement, Type A aortic dissection s/p repair c/b ischemic bowel s/p resection, seizure disorder, former substance use disorder recently hospitalized 1/17-1/18 for PNA and d/c'd on doxy presented w/ SOB w/ associated productive cough, NBNB emesis and subjective fevers for the past 2 days.  Pt states mucus is thick and green.  He has also had generalized weakness and was unable to go to HD yesterday.  VS in ED significant for sinus tachy in 120's, tachypnea in 20's and hypoxic to mid 80's ORA now on 4L NC and satting well.  Clinically pt is toxic appearing w/ scattered course rales but able to speak full sentences.  Initial w/u significant for AGMA, BNP >60K, trop 119, EKG w/ no acute ischemic changes, CXR w/ increased pulm vascular congestion but no obvious consolidation/infiltrates.  s/p rocephin/azithro in ED.  Nephro consulted by ED. Will admit for acute hypoxic respiratory failure.       Acute hypoxic respiratory failure w/ suspicion for PNA   - Not on O2 at baseline and currently requiring 4L to maintain O2 sats  - Has course rales and productive sputum green in color w/ subjective fevers and toxic appearing concerning for PNA   - CXR w/ no consolidation/infiltrate but noted to have pulm vascular congestion and likely from missed HD which can also contribute to hypoxia   - RVP negative   - Will order infectious w/u and empiric abx as noted below   - PE considered and CTA chest pending   - PRN duonebs and encourage Incentive spirometer and flutter valve use   - Guafenesin and tessalon pearls prn   - Aspiration precautions  - Monitor on tele/       SIRS w/ suspicion for sepsis 2/2 PNA   - Tachy, tachypneic and clinically toxic appearing w/ green sputum and course rales on exam   - Lactate normal   - CXR w/ no consolidation or infiltrate and RVP negative   - Blood cultures collected in ED  - Pt makes urine therefore will order UA   - Will order sputum culture, legionella/strep UAg and mycoplasma pcr  - c/w empiric abx coverage w/ cefepime and 1x dose of vanco given recent hospitalization   - Will order MRSA pcr and if positive will c/w vanco but otherwise cefepime only   - Defer procal given ESRD can falsly elevate   - Monitor CBC and temperature       Type II MI, likely demand 2/2 above or poor renal clearance   - Always has elevated trops although a bit higher than prior levels   - No active chest pain and EKG w/ no acute ischemic changes   - TTE from 12/2024 w/ LVEF 55%  - Will repeat trop and if flat or down trending will defer further w/u   - If Trop up trending will w/u further and consult cardio  - Monitor on telemetry       ESRD on HD  AGMA 2/2 uremia   - Missed HD yesterday bc not feeling well   - BNP >60K likely from missed HD  - CXR w/ pulm vascular congestion   - Anticipate AG will improve s/p HD   - c/w home meds  - Nephro consulted for HD       Normocytic anemia of chronic dx in setting of ESRD  - H/H stable and at baseline  - Monitor CBC and transfuse if needed for Hb<8      pAfib  - Currently in sinus tachy   - s/p watchman and not on AC due to prior GIB  - c/w home meds       CAD s/p CABG  HTN / HLD  - c/w home meds         VTE ppx:  SCDs given hx of GIB     Dispo: Acute.  Anticipate d/c home once medically stable

## 2025-02-16 NOTE — H&P ADULT - NSHPLABSRESULTS_GEN_ALL_CORE
10.4   9.78  )-----------( 116      ( 16 Feb 2025 11:07 )             33.0         02-16    144  |  104  |  79.3[H]  ----------------------------<  104[H]  4.4   |  20.0[L]  |  7.30[H]    Ca    9.4      16 Feb 2025 11:07  Phos  6.5     02-16  Mg     1.9     02-16    TPro  7.6  /  Alb  4.5  /  TBili  1.9  /  DBili  x   /  AST  17  /  ALT  10  /  AlkPhos  93  02-16

## 2025-02-16 NOTE — ED PROVIDER NOTE - PROGRESS NOTE DETAILS
Tam: Nephro contacted for hemodialysis, right lower lobe infiltrate and air bronchograms seen on x-ray, given ceftriaxone and azithromycin.  Heart rate currently in the 120s, when laying flat patient desatting to 88% on 4 L nasal cannula.  When upright oxygen levels recovering to 96%, possible atelectasis component.  Plan to admit to telemetry, will obtain CTA chest to rule out PE as patient is not on anticoagulation.

## 2025-02-16 NOTE — ED PROVIDER NOTE - CADM POA PRESS ULCER
[facility]  Nephrology  Progress Note    Patient Name: Vaughn Retana  MRN: [id number]  Admission Date: 3/28/2020  Hospital Length of Stay: 0 days  Attending Provider: Tawanda Garcia MD   Primary Care Physician: Cori Randall MD  Principal Problem:COVID-19 virus infection    Subjective:     HPI: 55 y/o male with PMHx ESRD on iHD (MWF), GI bleeds, + COVID-19 (3/25/20)        Interval History: Nephrology consulted for ESRD Management    Review of patient's allergies indicates:   Allergen Reactions    Fosrenol [lanthanum] Nausea And Vomiting     Nausea and vomiting     Current Facility-Administered Medications   Medication Frequency    0.9%  NaCl infusion PRN    0.9%  NaCl infusion Once    acetaminophen tablet 650 mg Q4H PRN    albuterol inhaler 2 puff Q6H PRN    atorvastatin tablet 40 mg QHS    cefTRIAXone injection 1 g Q24H    dextromethorphan-guaifenesin  mg/5 ml liquid 10 mL Q4H PRN    dextrose 10% (D10W) Bolus PRN    dextrose 10% (D10W) Bolus PRN    doxycycline tablet 100 mg Q12H    glucagon (human recombinant) injection 1 mg PRN    glucose chewable tablet 16 g PRN    glucose chewable tablet 24 g PRN    hydrALAZINE tablet 25 mg Q8H PRN    levetiracetam oral soln Soln 250 mg BID    melatonin tablet 6 mg Nightly PRN    metoclopramide HCl tablet 10 mg TID AC    midodrine tablet 5 mg Every Mon, Wed, Fri    ondansetron disintegrating tablet 8 mg Q8H PRN    pantoprazole injection 40 mg Daily    promethazine (PHENERGAN) 6.25 mg in dextrose 5 % 50 mL IVPB Q6H PRN    senna tablet 2 tablet Daily    sevelamer carbonate tablet 800 mg TID WM    sodium chloride 0.9% flush 10 mL PRN       Objective:     Vital Signs (Most Recent):  Temp: 98.9 °F (37.2 °C) (03/30/20 0746)  Pulse: 95 (03/30/20 1130)  Resp: 16 (03/30/20 1008)  BP: (!) 153/85 (03/30/20 0746)  SpO2: 95 % (03/30/20 1008)  O2 Device (Oxygen Therapy): room air (03/30/20 1008) Vital Signs (24h Range):  Temp:  [98.9 °F  (37.2 °C)-99.4 °F (37.4 °C)] 98.9 °F (37.2 °C)  Pulse:  [] 95  Resp:  [16-20] 16  SpO2:  [95 %-100 %] 95 %  BP: (102-153)/(50-85) 153/85     Weight: 54 kg (119 lb 0.8 oz) (03/28/20 2200)  Body mass index is 19.21 kg/m².  Body surface area is 1.59 meters squared.    I/O last 3 completed shifts:  In: 480 [P.O.:360; NG/GT:120]  Out: -     Physical Exam: Deferred to Hospital Medicine d/t COVID-19 precautions.    Significant Labs:  All labs within the past 24 hours have been reviewed.     Significant Imaging:  Labs: Reviewed    Assessment/Plan:     * COVID-19 virus infection  Managed by Hospital Medicine    ESRD on dialysis  -HD today    -Hgb 8.2 (and has trended down form 10.2 3/25/20)  PMHx: GIB  Ferritin 1762 3/28/20  Managed by [facility]    Corrected Ca+ 9.24  Ph- 2.5  -sevelamer carbonate to BID  On renal  -receiving Novasource with tube feeds          Thank you for your consult. I will follow-up with patient. Please contact us if you have any additional questions.    SEAN Figueroa  Nephrology  [facility]   No

## 2025-02-16 NOTE — CONSULT NOTE ADULT - SUBJECTIVE AND OBJECTIVE BOX
[facility]                                              CARDIOLOGY AT [facility]                                                   [address], [place] [address]                                             Telephone: [phone]. Fax:[phone]                                                       CARDIOLOGY CONSULTATION NOTE                                                                                             History obtained by: Patient and medical record   Community Cardiologist: Dr. Frankel   obtained: Yes [  ] No [  ]  Available out pt records reviewed: Yes [  ] No [  ]    Chief complaint:    Patient is a 64y old  Male who presents with a chief complaint of acute hypoxic resp failrue (2025 17:16)      HPI:  63 y/o M w/ PMH of ESRD (on HD //), Afib (not on AC 2/2 prior GIB now s/p watchman), CAD s/p CABG, aortic and mitral valve replacement, Type A aortic dissection s/p repair c/b ischemic bowel s/p resection, seizure disorder, former substance use disorder recently hospitalized - for PNA and d/c'd on doxy presented w/ SOB w/ associated productive cough, NBNB emesis and subjective fevers for the past 2 days.  Pt states mucus is thick and green.  He has also had generalized weakness and was unable to go to HD yesterday.  Pt has had no further episodes of vomiting and denies diarrhea recent travel, dysuria, recent sick contacts.  Pt denies smoking/vaping.  He denies cp, palpitations, abd pain, LE edema, orthopnea.     (2025 16:39)      Review of symptoms:   Cardiac:  No chest pain. No dyspnea. No palpitations.  Respiratory: no cough. No dyspnea  Gastrointestinal: No diarrhea. No abdominal pain. No bleeding.   Neuro: No focal neuro complaints.  All other ROS negative unless otherwise listed above    PHYSICAL EXAM:  Appearance: Comfortable. No acute distress  HEENT:  Atraumatic. Normocephalic.  Normal oral mucosa  Neurologic: A & O x 3, no gross focal deficits.  Cardiovascular: RRR S1 S2, No murmur, no rubs/gallops. No JVD  Respiratory: Lungs clear to auscultation, unlabored   Gastrointestinal:  Soft, Non-tender, + BS  Lower Extremities: 2+ Peripheral Pulses, No clubbing, cyanosis, or edema  Psychiatry: Patient is calm. No agitation.   Skin: warm and dry.    PAST MEDICAL HISTORY  CHF (congestive heart failure)    CVA (cerebral vascular accident)    Chronic kidney disease    Seizures    HTN (hypertension)    Hyperlipemia    COVID-19    Atrial fibrillation    ESRD on dialysis    Former smoker    History of cocaine use    H/O aortic dissection    H/O aortic valve insufficiency    Mitral regurgitation    GIB (gastrointestinal bleeding)    CAD (coronary artery disease)    Chronic atrial fibrillation    Aneurysm of artery of upper extremity    Anemia of chronic disease    End stage renal disease    Myocardial infarction    COVID-19 vaccine series completed    Port-A-Cath in place        PAST SURGICAL HISTORY  Aorta disorder    H/O colectomy    Status post double vessel coronary artery bypass    S/P AVR (aortic valve replacement)    S/P MVR (mitral valve replacement)    H/O aortic dissection    AV fistula    History of renal transplant    Presence of Watchman left atrial appendage closure device        SUBSTANCE USE HISTORY  Denies current and previous substance use [  ]   CIGARETTES -   ALCOHOL -   DRUGS -     FAMILY HISTORY:  FH: hypertension    Family history of cardiac disorder (Mother)  mother        CARDIAC SPECIFIC FAMILY HX   No KNOWN family history of Cardiovascular disease, CAD, or sudden death in first degree relatives unless specified below  Family History of Cardiovascular Disease:  [  ]   Coronary Artery Disease in first degree relative:  [  ]   Sudden Cardiac Death in First degree relative: [  ]    HOME MEDICATIONS:  acetaminophen 325 mg oral tablet: 2 tab(s) orally every 6 hours As needed Temp greater or equal to 38C (100.4F), Mild Pain (1 - 3) (2025 16:41)  amLODIPine 10 mg oral tablet: 1 tab(s) orally once a day (2025 16:54)  atorvastatin 40 mg oral tablet: 1 tab(s) orally once a day (at bedtime) (2025 16:41)  carvedilol 6.25 mg oral tablet: 1 tab(s) orally 2 times a day (2025 16:55)  cloNIDine 0.3 mg oral tablet: 1 tab(s) orally 2 times a day (2025 16:56)  hydrALAZINE 100 mg oral tablet: 1 tab(s) orally 2 times a day (2025 16:56)  isosorbide mononitrate 60 mg oral tablet, extended release: 1 tab(s) orally once a day (2025 16:41)  levETIRAcetam 500 mg oral tablet: 1 tab(s) orally 2 times a day (2025 16:41)  tamsulosin 0.4 mg oral capsule: 1 cap(s) orally once a day (at bedtime) (2025 16:41)  torsemide 20 mg oral tablet: 1 tab(s) orally once a day (2025 16:56)      CURRENT CARDIAC MEDICATIONS:  amLODIPine   Tablet 10 milliGRAM(s) Oral daily  carvedilol 6.25 milliGRAM(s) Oral every 12 hours  cloNIDine 0.3 milliGRAM(s) Oral two times a day  hydrALAZINE 100 milliGRAM(s) Oral two times a day  isosorbide   mononitrate ER Tablet (IMDUR) 60 milliGRAM(s) Oral daily  torsemide 20 milliGRAM(s) Oral daily      CURRENT OTHER MEDICATIONS:  albuterol/ipratropium for Nebulization 3 milliLiter(s) Nebulizer every 6 hours PRN Shortness of Breath and/or Wheezing  benzonatate 100 milliGRAM(s) Oral every 8 hours PRN Cough  guaifenesin/dextromethorphan Oral Liquid 10 milliLiter(s) Oral every 4 hours PRN Cough  acetaminophen     Tablet .. 650 milliGRAM(s) Oral every 6 hours PRN Temp greater or equal to 38C (100.4F), Mild Pain (1 - 3)  levETIRAcetam 500 milliGRAM(s) Oral two times a day  melatonin 3 milliGRAM(s) Oral at bedtime PRN Insomnia  ondansetron Injectable 4 milliGRAM(s) IV Push every 8 hours PRN Nausea and/or Vomiting  aluminum hydroxide/magnesium hydroxide/simethicone Suspension 30 milliLiter(s) Oral every 4 hours PRN Dyspepsia  pantoprazole    Tablet 40 milliGRAM(s) Oral before breakfast  atorvastatin 40 milliGRAM(s) Oral at bedtime  cefepime  Injectable. 1000 milliGRAM(s) IV Push every 24 hours  sevelamer carbonate 1600 milliGRAM(s) Oral three times a day with meals  tamsulosin 0.4 milliGRAM(s) Oral at bedtime      ALLERGIES:   penicillin (Other)      VITAL SIGNS:  T(C): 36.9 (25 @ 11:11), Max: 36.9 (25 @ 11:11)  T(F): 98.4 (02-16-25 @ 11:11), Max: 98.4 (25 @ 11:11)  HR: 125 (25 @ 15:28) (122 - 132)  BP: 139/90 (25 @ 15:28) (139/90 - 185/104)  RR: 18 (25 @ 15:28) (18 - 22)  SpO2: 95% (25 @ 15:28) (87% - 96%)    INTAKE AND OUTPUT:       LABS:                            10.4   9.78  )-----------( 116      ( 2025 11:07 )             33.0         144  |  104  |  79.3[H]  ----------------------------<  104[H]  4.4   |  20.0[L]  |  7.30[H]    Ca    9.4      2025 11:07  Phos  6.5       Mg     1.9         TPro  7.6  /  Alb  4.5  /  TBili  1.9  /  DBili  x   /  AST  17  /  ALT  10  /  AlkPhos  93        Urinalysis Basic - ( 2025 11:07 )    Color: x / Appearance: x / SG: x / pH: x  Gluc: 104 mg/dL / Ketone: x  / Bili: x / Urobili: x   Blood: x / Protein: x / Nitrite: x   Leuk Esterase: x / RBC: x / WBC x   Sq Epi: x / Non Sq Epi: x / Bacteria: x              RADIOLOGY IMAGIN Lead ECG:   Provider's Contact #: [phone] (25 @ 16:31) [Ordered.]  CT Angio Chest PE Protocol w/ IV Cont: Urgent   Indication: Tachy, Hypoxic, no AC hx of afib  Transport: Stretcher-Crib  Exam Completed (25 @ 13:42) [Performed]  Xray Chest 1 View- PORTABLE-Urgent: Urgent   Indication: Shortness of Breath  Transport: Portable  Exam Completed (25 @ 10:13) [Results Available]  12 Lead ECG (25 @ 09:42) [Completed]

## 2025-02-16 NOTE — ED PROVIDER NOTE - CARE PLAN
1 Principal Discharge DX:	Acute hypoxic respiratory failure  Secondary Diagnosis:	Right lower lobe pneumonia  Secondary Diagnosis:	Atrial fibrillation with RVR

## 2025-02-16 NOTE — CONSULT NOTE ADULT - ASSESSMENT
65 y/o M w/ PMH of ESRD (on HD tu/th/sa), Afib (not on AC 2/2 prior GIB now s/p watchman), CAD s/p CABG, aortic and mitral valve replacement, Type A aortic dissection s/p repair c/b ischemic bowel s/p resection, seizure disorder, former substance use disorder recently hospitalized 1/17-1/18 for PNA and d/c'd on doxy presented w/ SOB w/ associated productive cough, NBNB emesis and subjective fevers for the past 2 days.  Pt states mucus is thick and green.  He has also had generalized weakness and was unable to go to HD yesterday.  Pt has had no further episodes of vomiting and denies diarrhea recent travel, dysuria, recent sick contacts.      KMissed HD yesterday but doesn't want t0day- will do am    Labs acceptable    HD am    Shall follow ; Thanks  
65 y/o M w/ PMH of ESRD (on HD tu/th/sa), HTN, HLD, Afib (not on AC 2/2 prior GIB now s/p watchman), CAD s/p CABG, aortic and mitral valve replacement, Type A aortic dissection s/p repair c/b ischemic bowel s/p resection, seizure disorder, former substance use disorder recently hospitalized 1/17-1/18 for PNA and d/c'd on doxy presented w/ SOB w/ associated productive cough, NBNB emesis and subjective fevers for the past 2 days.  Pt states mucus is thick and green.  He has also had generalized weakness and was unable to go to HD yesterday.  VS in ED significant for sinus tachy in 120's, tachypnea in 20's and hypoxic to mid 80's ORA now on 4L NC and satting well.  Clinically pt is toxic appearing w/ scattered course rales but able to speak full sentences.  Initial w/u significant for AGMA, BNP >60K, trop 119, EKG w/ no acute ischemic changes, CXR w/ increased pulm vascular congestion but no obvious consolidation/infiltrates.  s/p rocephin/azithro in ED.  Nephro consulted by ED. Will admit for acute hypoxic respiratory failure. Cardiology consulted due to elevated troponin.

## 2025-02-16 NOTE — ED PROVIDER NOTE - ATTENDING CONTRIBUTION TO CARE
I, Chad Napier, performed a face to face bedside interview with this patient regarding history of present illness, and completed an independent physical examination. I personally made/approved the management plan and take responsibility for the patient management. I have communicated the patient’s plan of care and disposition with the resident.  64 year old male with PMH end-stage renal disease on hemodialysis Tuesday/Thursday/Saturday, A-fib not on anticoagulation due to GI bleed, CAD status post CABG, aortic and mitral valve replacements, type a aortic dissection presents with cough and SOB since yesterday. Pt states he felt too ill to go to dialysis yesterday. He reports subjective fevers but denies chest pain, LE edema, hemoptysis  Gen: NAD, well appearing  CV: RRR  Pul: rales at R base  Abd: Soft, non-distended, non-tender  Neuro: no focal deficits  Pt admitted for pna and hypoxia, missed dialysis

## 2025-02-16 NOTE — ED PROVIDER NOTE - CLINICAL SUMMARY MEDICAL DECISION MAKING FREE TEXT BOX
64-year-old male with history of end-stage renal disease on hemodialysis missed last session yesterday, CAD status post CABG with multiple valve replacements presenting with 1 day of cough, shortness of breath, and tachypnea.  Patient with diffuse rhonchi and expiratory wheeze with productive cough on examination, concerning for pneumonia.  Patient also noted to be in A-fib with RVR, fluid resuscitation limited due to chronic renal failure.  Will give 5 mg IV Lopressor, infectious workup, likely admission.

## 2025-02-16 NOTE — CONSULT NOTE ADULT - PROBLEM SELECTOR RECOMMENDATION 9
- pt w/ infection. thick green mucous   - missed HD session  - now with anion gap metabolic acidosis  - recently hospitalized with PNA now presenting with emesis and fever   - patients clinical constellation of symptoms is consistent with sepsis   - there are no acute cardiac etiologies present that would raise troponin  - troponin is chronically elevated secondary to ESRD  - no angina/anginal equivalents  - limited intervention for pro-bnp given pt is on HD. volume management is as per HD.   - treat underlying infection   - can call back if there is concern for underlying cardiac etiologies.   - continue home meds, we will sign off.

## 2025-02-16 NOTE — ED PROVIDER NOTE - PHYSICAL EXAMINATION
General:   Chronically ill-appearing  Head: NC, AT  EENT: EOMI, no scleral icterus  Cardiac:  tachycardic  and irregular, no apparent murmurs,  trace pitting edema bilaterally  Respiratory:  diffuse rhonchi with occasional expiratory wheeze,  tachypneic but not hypoxic  Abdomen: soft, ND, NT, nonperitonitic  MSK/Vascular: full ROM, soft compartments, warm extremities, 2+ peripheral pulses bilaterally  Neuro: AAOx3, sensation to light touch intact  Psych: calm, cooperative

## 2025-02-16 NOTE — ED PROVIDER NOTE - OBJECTIVE STATEMENT
64-year-old male with past medical history of end-stage renal disease on hemodialysis Tuesday/Thursday/Saturday, A-fib not on anticoagulation due to GI bleed, CAD status post CABG, aortic and mitral valve replacements, type a aortic dissection status postrepair with persistent leakage per patient, presenting to the ER with cough, shortness of breath, generalized weakness since yesterday morning.  Patient was unable to make his Saturday dialysis session due to his illness.  Came to the ER today because his shortness of breath worsened.  He denies chest pain, leg pain, sick contacts.

## 2025-02-16 NOTE — PATIENT PROFILE ADULT - FALL HARM RISK - HARM RISK INTERVENTIONS

## 2025-02-16 NOTE — H&P ADULT - NSHPPHYSICALEXAM_GEN_ALL_CORE
GENERAL: pt examined bedside, toxic appearing and uncomfortable but NAD  HEENT: NC/AT, dry oral mucosa, clear conjunctiva, sclera nonicteric  RESPIRATORY: poor inspiratory effort, scattered course rales, no wheezing or rhonchi  CARDIOVASCULAR: fast rate regular rhythm, normal S1 and S2, no murmur/rub/gallop  ABDOMEN: soft, NT/ND, normoactive bowel sounds, no rebound/guarding  MSK: No joint deformities, edema, erythema  EXTREMITIES: No cynaosis, no clubbing, no lower extremity edema  PSYCH: affect appropriate and cooperative  NEUROLOGY: A+O to person, place, and time, no focal neurologic deficits appreciated  SKIN: No rashes or no palpable lesions

## 2025-02-16 NOTE — CONSULT NOTE ADULT - SUBJECTIVE AND OBJECTIVE BOX
Patient is a 64y old  Male who presents with a chief complaint of SOB, found with acute hypoxic resp failrue (16 Feb 2025 16:39)      HPI:  65 y/o M w/ PMH of ESRD (on HD tu/th/sa), Afib (not on AC 2/2 prior GIB now s/p watchman), CAD s/p CABG, aortic and mitral valve replacement, Type A aortic dissection s/p repair c/b ischemic bowel s/p resection, seizure disorder, former substance use disorder recently hospitalized 1/17-1/18 for PNA and d/c'd on doxy presented w/ SOB w/ associated productive cough, NBNB emesis and subjective fevers for the past 2 days.  Pt states mucus is thick and green.  He has also had generalized weakness and was unable to go to HD yesterday.  Pt has had no further episodes of vomiting and denies diarrhea recent travel, dysuria, recent sick contacts.  Pt denies smoking/vaping.  He denies cp, palpitations, abd pain, LE edema, orthopnea.     (16 Feb 2025 16:39)      PAST MEDICAL & SURGICAL HISTORY:  CHF (congestive heart failure)      CVA (cerebral vascular accident)      Seizures  last seizure 10 years ago      HTN (hypertension)      Hyperlipemia      COVID-19  october 2020      Atrial fibrillation      Former smoker      History of cocaine use  remote hx, currently sober      H/O aortic dissection  s/p repair (2010), surgery complicated by bowel ischemia s/p bowel resection and ostomy with reversal      H/O aortic valve insufficiency      Mitral regurgitation      GIB (gastrointestinal bleeding)      CAD (coronary artery disease)  s/p PCI 1998  and CABG x 2 11/2020      Chronic atrial fibrillation      Aneurysm of artery of upper extremity      Anemia of chronic disease      End stage renal disease      Myocardial infarction      COVID-19 vaccine series completed      Port-A-Cath in place      H/O colectomy      Status post double vessel coronary artery bypass  11/2020 Dr Butler      S/P AVR (aortic valve replacement)  (t)AVR 11/2020 Dr Butler      S/P MVR (mitral valve replacement)  (t)MVR 11/2020 Dr Butler      H/O aortic dissection  Type A; emergent repair 2010      AV fistula  LUE      History of renal transplant      Presence of Watchman left atrial appendage closure device          FAMILY HISTORY:  FH: hypertension    Family history of cardiac disorder (Mother)  mother        Social History:   -smoke   - Alcohol   -    Drugs        REVIEW OF SYSTEMS:  CONSTITUTIONAL: No fever, weight loss, + fatigue  EYES: No eye pain, No visual disturbances,   RESPIRATORY: No cough, hemoptysis; + SOB  CARDIOVASCULAR: No chest pain, No palpitations,   -leg swelling  GASTROINTESTINAL: No abdominal , NVD or GIB  GENITOURINARY: No UTI sx/hematuria  NEUROLOGICAL: No HA/wk/numbness  MUSCULOSKELETAL: No joint pain, No swelling      Vital Signs Last 24 Hrs  T(C): 36.9 (16 Feb 2025 11:11), Max: 36.9 (16 Feb 2025 11:11)  T(F): 98.4 (16 Feb 2025 11:11), Max: 98.4 (16 Feb 2025 11:11)  HR: 125 (16 Feb 2025 15:28) (122 - 132)  BP: 139/90 (16 Feb 2025 15:28) (139/90 - 185/104)  BP(mean): 97 (16 Feb 2025 12:46) (97 - 97)  RR: 18 (16 Feb 2025 15:28) (18 - 22)  SpO2: 95% (16 Feb 2025 15:28) (87% - 96%)    Parameters below as of 16 Feb 2025 15:28  Patient On (Oxygen Delivery Method): nasal cannula        PHYSICAL EXAM:    GENERAL: NAD, Frail  EYES:  conjunctiva and sclera clear  NECK: Supple, No JVD/Carotid Bruit -ve   NERVOUS SYSTEM:  A/O x3,   Lungs : No rales, Scatt rhonchi,   HEART:  No murmur,  No rub, No gallops; Irreg; Monitor Afib with /m  ABDOMEN: Soft, NT/ND BS+  EXTREMITIES:  + Peripheral Pulses, - edema  SKIN: No rashes or lesions      LABS:                        10.4   9.78  )-----------( 116      ( 16 Feb 2025 11:07 )             33.0     02-16    144  |  104  |  79.3[H]  ----------------------------<  104[H]  4.4   |  20.0[L]  |  7.30[H]    Ca    9.4      16 Feb 2025 11:07  Phos  6.5     02-16  Mg     1.9     02-16    TPro  7.6  /  Alb  4.5  /  TBili  1.9  /  DBili  x   /  AST  17  /  ALT  10  /  AlkPhos  93  02-16      Urinalysis Basic - ( 16 Feb 2025 11:07 )    Color: x / Appearance: x / SG: x / pH: x  Gluc: 104 mg/dL / Ketone: x  / Bili: x / Urobili: x   Blood: x / Protein: x / Nitrite: x   Leuk Esterase: x / RBC: x / WBC x   Sq Epi: x / Non Sq Epi: x / Bacteria: x      Magnesium: 1.9 mg/dL (02-16 @ 11:07)  Phosphorus: 6.5 mg/dL (02-16 @ 11:07)      RADIOLOGY & ADDITIONAL TESTS:    MEDICATIONS  (STANDING):  acetaminophen     Tablet .. PRN  albuterol/ipratropium for Nebulization PRN  aluminum hydroxide/magnesium hydroxide/simethicone Suspension PRN  amLODIPine   Tablet  atorvastatin  benzonatate PRN  carvedilol  cefepime  Injectable.  cloNIDine  guaifenesin/dextromethorphan Oral Liquid PRN  hydrALAZINE  isosorbide   mononitrate ER Tablet (IMDUR)  levETIRAcetam  melatonin PRN  ondansetron Injectable PRN  pantoprazole    Tablet  sevelamer carbonate  tamsulosin  torsemide  vancomycin  IVPB

## 2025-02-16 NOTE — PATIENT PROFILE ADULT - PATIENT'S PREFERRED PRONOUN
Received call from Dr Renetta John [phone], Mallory Valdez PA-C [id number]            Patient Active Problem List:     Asthma     Mild persistent asthma without complication     Allergic to food     Bilateral thoracic back pain     Overweight (BMI 25.0-29. 9)     Laceration of left wrist with complication Him/He

## 2025-02-16 NOTE — ED ADULT NURSE NOTE - CHIEF COMPLAINT QUOTE
pt arrives to ED c/o shortness of breath, history of stroke/open heart/valve replacement/afib/dialysis L-V-P-fistual in left, denies N/V/D

## 2025-02-16 NOTE — CONSULT NOTE ADULT - NS ATTEND AMEND GEN_ALL_CORE FT
Patient seen and examined and presents with acute hypoxemic respiratory failure possibly secondary to infectious etiology vs HF  - no chest pain or lower extremity edema with no evidence of pleural effusion overall sounds to be wheezing   - recently hospitalized with PNA now presenting with emesis and fever   - patients clinical constellation of symptoms is consistent with sepsis   - plan for HD with Nephrology  - would recommend to start Torsemide 20mg po daily instead of PRN     Elevated trops likely in the setting of hypoxemic resp failure vs myocardial injury    would recommend to trend trops with EKG   continue with ASA and statin   ? if there is a need for RHC/LHC if shortness of breath does not imporve after dialysis and so wll reassess tomorrow     Thania Moody D.O. [facility]  Cardiology/Vascular Cardiology -[facility]   Telephone # [phone]

## 2025-02-16 NOTE — ED ADULT TRIAGE NOTE - HOW PATIENT ADDRESSED, PROFILE
Attempted to call patient to reschedule appointment for 5/26/2022 to 6/9/2022 due to testing not being completed. Was not able to leave voicemail. Hilton

## 2025-02-16 NOTE — ED ADULT TRIAGE NOTE - CHIEF COMPLAINT QUOTE
pt arrives to ED c/o shortness of breath, history of stroke/open heart/valve replacement/afib/dialysis Z-A-P-fistual in left, denies N/V/D

## 2025-02-16 NOTE — H&P ADULT - HISTORY OF PRESENT ILLNESS
63 y/o M w/ PMH of ESRD (on HD tu/th/sa), Afib (not on AC 2/2 prior GIB now s/p watchman), CAD s/p CABG, aortic and mitral valve replacement, Type A aortic dissection s/p repair c/b ischemic bowel s/p resection, seizure disorder, former substance use disorder recently hospitalized 1/17-1/18 for PNA and d/c'd on doxy presented w/ SOB w/ associated productive cough, NBNB emesis and subjective fevers for the past 2 days.  Pt states mucus is thick and green.  He has also had generalized weakness and was unable to go to HD yesterday.  Pt has had no further episodes of vomiting and denies diarrhea recent travel, dysuria, recent sick contacts.  Pt denies smoking/vaping.  He denies cp, palpitations, abd pain, LE edema, orthopnea.

## 2025-02-16 NOTE — ED ADULT NURSE NOTE - HIV OFFER
Please call us when you finish your Symbicort supply- we might increase the dose if you are still having significant productive cough and shortness of breath    Please reach out to Kindra and discuss mask fit problem and the BiPAP not turning off properly   Previously Declined (within the last year)

## 2025-02-17 ENCOUNTER — TRANSCRIPTION ENCOUNTER (OUTPATIENT)
Age: 65
End: 2025-02-17

## 2025-02-17 VITALS
DIASTOLIC BLOOD PRESSURE: 72 MMHG | RESPIRATION RATE: 18 BRPM | SYSTOLIC BLOOD PRESSURE: 119 MMHG | HEART RATE: 99 BPM | OXYGEN SATURATION: 96 % | TEMPERATURE: 98 F

## 2025-02-17 LAB
ALBUMIN SERPL ELPH-MCNC: 3.3 G/DL — SIGNIFICANT CHANGE UP (ref 3.3–5.2)
ALP SERPL-CCNC: 69 U/L — SIGNIFICANT CHANGE UP (ref 40–120)
ALT FLD-CCNC: 8 U/L — SIGNIFICANT CHANGE UP
ANION GAP SERPL CALC-SCNC: 18 MMOL/L — HIGH (ref 5–17)
APPEARANCE UR: CLEAR — SIGNIFICANT CHANGE UP
AST SERPL-CCNC: 12 U/L — SIGNIFICANT CHANGE UP
BACTERIA # UR AUTO: NEGATIVE /HPF — SIGNIFICANT CHANGE UP
BASOPHILS # BLD AUTO: 0.04 K/UL — SIGNIFICANT CHANGE UP (ref 0–0.2)
BASOPHILS # BLD MANUAL: 0.18 K/UL — SIGNIFICANT CHANGE UP (ref 0–0.2)
BASOPHILS NFR BLD AUTO: 0.8 % — SIGNIFICANT CHANGE UP (ref 0–2)
BASOPHILS NFR BLD MANUAL: 3.5 % — HIGH (ref 0–2)
BILIRUB SERPL-MCNC: 1.2 MG/DL — SIGNIFICANT CHANGE UP (ref 0.4–2)
BILIRUB UR-MCNC: NEGATIVE — SIGNIFICANT CHANGE UP
BUN SERPL-MCNC: 81.8 MG/DL — HIGH (ref 8–20)
CALCIUM SERPL-MCNC: 8 MG/DL — LOW (ref 8.4–10.5)
CAST: 2 /LPF — SIGNIFICANT CHANGE UP (ref 0–4)
CHLORIDE SERPL-SCNC: 104 MMOL/L — SIGNIFICANT CHANGE UP (ref 96–108)
CO2 SERPL-SCNC: 19 MMOL/L — LOW (ref 22–29)
COLOR SPEC: YELLOW — SIGNIFICANT CHANGE UP
CREAT SERPL-MCNC: 7.51 MG/DL — HIGH (ref 0.5–1.3)
DACRYOCYTES BLD QL SMEAR: SLIGHT — SIGNIFICANT CHANGE UP
DIFF PNL FLD: ABNORMAL
EGFR: 8 ML/MIN/1.73M2 — LOW
ELLIPTOCYTES BLD QL SMEAR: SLIGHT — SIGNIFICANT CHANGE UP
EOSINOPHIL # BLD AUTO: 0.12 K/UL — SIGNIFICANT CHANGE UP (ref 0–0.5)
EOSINOPHIL # BLD MANUAL: 0.09 K/UL — SIGNIFICANT CHANGE UP (ref 0–0.5)
EOSINOPHIL NFR BLD AUTO: 2.3 % — SIGNIFICANT CHANGE UP (ref 0–6)
EOSINOPHIL NFR BLD MANUAL: 1.7 % — SIGNIFICANT CHANGE UP (ref 0–6)
GIANT PLATELETS BLD QL SMEAR: PRESENT
GLUCOSE SERPL-MCNC: 81 MG/DL — SIGNIFICANT CHANGE UP (ref 70–99)
GLUCOSE UR QL: NEGATIVE MG/DL — SIGNIFICANT CHANGE UP
HCT VFR BLD CALC: 28 % — LOW (ref 39–50)
HGB BLD-MCNC: 8.7 G/DL — LOW (ref 13–17)
IMM GRANULOCYTES # BLD AUTO: 0.02 K/UL — SIGNIFICANT CHANGE UP (ref 0–0.07)
IMM GRANULOCYTES NFR BLD AUTO: 0.4 % — SIGNIFICANT CHANGE UP (ref 0–0.9)
IMMATURE PLATELET FRACTION #: 3.2 K/UL — LOW (ref 3.9–12.5)
IMMATURE PLATELET FRACTION %: 4.2 % — SIGNIFICANT CHANGE UP (ref 1.6–7.1)
KETONES UR-MCNC: NEGATIVE MG/DL — SIGNIFICANT CHANGE UP
LEUKOCYTE ESTERASE UR-ACNC: NEGATIVE — SIGNIFICANT CHANGE UP
LYMPHOCYTES # BLD AUTO: 0.98 K/UL — LOW (ref 1–3.3)
LYMPHOCYTES # BLD MANUAL: 0.53 K/UL — LOW (ref 1–3.3)
LYMPHOCYTES NFR BLD AUTO: 19.1 % — SIGNIFICANT CHANGE UP (ref 13–44)
LYMPHOCYTES NFR BLD MANUAL: 10.4 % — LOW (ref 13–44)
MACROCYTES BLD QL: SLIGHT — SIGNIFICANT CHANGE UP
MAGNESIUM SERPL-MCNC: 1.9 MG/DL — SIGNIFICANT CHANGE UP (ref 1.6–2.6)
MANUAL METAMYELOCYTE #: 0.05 K/UL — HIGH (ref 0–0)
MANUAL NEUTROPHIL BANDS #: 0.05 K/UL — SIGNIFICANT CHANGE UP (ref 0–0.84)
MANUAL REACTIVE LYMPHOCYTES #: 0.05 K/UL — SIGNIFICANT CHANGE UP (ref 0–0.63)
MCHC RBC-ENTMCNC: 31.1 G/DL — LOW (ref 32–36)
MCHC RBC-ENTMCNC: 31.5 PG — SIGNIFICANT CHANGE UP (ref 27–34)
MCV RBC AUTO: 101.4 FL — HIGH (ref 80–100)
METAMYELOCYTES # FLD: 0.9 % — HIGH (ref 0–0)
METAMYELOCYTES NFR BLD: 0.9 % — HIGH (ref 0–0)
MONOCYTES # BLD AUTO: 0.45 K/UL — SIGNIFICANT CHANGE UP (ref 0–0.9)
MONOCYTES # BLD MANUAL: 0.18 K/UL — SIGNIFICANT CHANGE UP (ref 0–0.9)
MONOCYTES NFR BLD AUTO: 8.8 % — SIGNIFICANT CHANGE UP (ref 2–14)
MONOCYTES NFR BLD MANUAL: 3.5 % — SIGNIFICANT CHANGE UP (ref 2–14)
NEUTROPHILS # BLD AUTO: 3.53 K/UL — SIGNIFICANT CHANGE UP (ref 1.8–7.4)
NEUTROPHILS # BLD MANUAL: 4.02 K/UL — SIGNIFICANT CHANGE UP (ref 1.8–7.4)
NEUTROPHILS NFR BLD AUTO: 68.6 % — SIGNIFICANT CHANGE UP (ref 43–77)
NEUTROPHILS NFR BLD MANUAL: 78.2 % — HIGH (ref 43–77)
NEUTS BAND # BLD: 0.9 % — SIGNIFICANT CHANGE UP (ref 0–8)
NEUTS BAND NFR BLD: 0.9 % — SIGNIFICANT CHANGE UP (ref 0–8)
NITRITE UR-MCNC: NEGATIVE — SIGNIFICANT CHANGE UP
NRBC # BLD AUTO: 0 K/UL — SIGNIFICANT CHANGE UP (ref 0–0)
NRBC # FLD: 0 K/UL — SIGNIFICANT CHANGE UP (ref 0–0)
NRBC BLD AUTO-RTO: 0 /100 WBCS — SIGNIFICANT CHANGE UP (ref 0–0)
OVALOCYTES BLD QL SMEAR: SLIGHT — SIGNIFICANT CHANGE UP
PH UR: 6 — SIGNIFICANT CHANGE UP (ref 5–8)
PHOSPHATE SERPL-MCNC: 6.8 MG/DL — HIGH (ref 2.4–4.7)
PLAT MORPH BLD: ABNORMAL
PLATELET # BLD AUTO: 76 K/UL — LOW (ref 150–400)
PLATELET COUNT - ESTIMATE: ABNORMAL
PMV BLD: 12 FL — SIGNIFICANT CHANGE UP (ref 7–13)
POIKILOCYTOSIS BLD QL AUTO: SLIGHT — SIGNIFICANT CHANGE UP
POLYCHROMASIA BLD QL SMEAR: SLIGHT — SIGNIFICANT CHANGE UP
POTASSIUM SERPL-MCNC: 4.4 MMOL/L — SIGNIFICANT CHANGE UP (ref 3.5–5.3)
POTASSIUM SERPL-SCNC: 4.4 MMOL/L — SIGNIFICANT CHANGE UP (ref 3.5–5.3)
PROT SERPL-MCNC: 5.8 G/DL — LOW (ref 6.6–8.7)
PROT UR-MCNC: 300 MG/DL
RBC # BLD: 2.76 M/UL — LOW (ref 4.2–5.8)
RBC # FLD: 16.6 % — HIGH (ref 10.3–14.5)
RBC BLD AUTO: ABNORMAL
RBC CASTS # UR COMP ASSIST: 4 /HPF — SIGNIFICANT CHANGE UP (ref 0–4)
SODIUM SERPL-SCNC: 141 MMOL/L — SIGNIFICANT CHANGE UP (ref 135–145)
SP GR SPEC: 1.02 — SIGNIFICANT CHANGE UP (ref 1–1.03)
SQUAMOUS # UR AUTO: 0 /HPF — SIGNIFICANT CHANGE UP (ref 0–5)
UROBILINOGEN FLD QL: 0.2 MG/DL — SIGNIFICANT CHANGE UP (ref 0.2–1)
VARIANT LYMPHS # BLD: 0.9 % — SIGNIFICANT CHANGE UP (ref 0–6)
VARIANT LYMPHS NFR BLD MANUAL: 0.9 % — SIGNIFICANT CHANGE UP (ref 0–6)
WBC # BLD: 5.14 K/UL — SIGNIFICANT CHANGE UP (ref 3.8–10.5)
WBC # FLD AUTO: 5.14 K/UL — SIGNIFICANT CHANGE UP (ref 3.8–10.5)
WBC UR QL: 0 /HPF — SIGNIFICANT CHANGE UP (ref 0–5)

## 2025-02-17 PROCEDURE — 71045 X-RAY EXAM CHEST 1 VIEW: CPT

## 2025-02-17 PROCEDURE — 83735 ASSAY OF MAGNESIUM: CPT

## 2025-02-17 PROCEDURE — 81001 URINALYSIS AUTO W/SCOPE: CPT

## 2025-02-17 PROCEDURE — 71275 CT ANGIOGRAPHY CHEST: CPT | Mod: MC

## 2025-02-17 PROCEDURE — 85018 HEMOGLOBIN: CPT

## 2025-02-17 PROCEDURE — 87637 SARSCOV2&INF A&B&RSV AMP PRB: CPT

## 2025-02-17 PROCEDURE — 93970 EXTREMITY STUDY: CPT

## 2025-02-17 PROCEDURE — 96368 THER/DIAG CONCURRENT INF: CPT

## 2025-02-17 PROCEDURE — 82746 ASSAY OF FOLIC ACID SERUM: CPT

## 2025-02-17 PROCEDURE — 82435 ASSAY OF BLOOD CHLORIDE: CPT

## 2025-02-17 PROCEDURE — 93005 ELECTROCARDIOGRAM TRACING: CPT

## 2025-02-17 PROCEDURE — 99239 HOSP IP/OBS DSCHRG MGMT >30: CPT

## 2025-02-17 PROCEDURE — 82947 ASSAY GLUCOSE BLOOD QUANT: CPT

## 2025-02-17 PROCEDURE — 85025 COMPLETE CBC W/AUTO DIFF WBC: CPT

## 2025-02-17 PROCEDURE — 99285 EMERGENCY DEPT VISIT HI MDM: CPT | Mod: 25

## 2025-02-17 PROCEDURE — 87340 HEPATITIS B SURFACE AG IA: CPT

## 2025-02-17 PROCEDURE — 83540 ASSAY OF IRON: CPT

## 2025-02-17 PROCEDURE — 96376 TX/PRO/DX INJ SAME DRUG ADON: CPT

## 2025-02-17 PROCEDURE — 74174 CTA ABD&PLVS W/CONTRAST: CPT | Mod: MC

## 2025-02-17 PROCEDURE — 96366 THER/PROPH/DIAG IV INF ADDON: CPT

## 2025-02-17 PROCEDURE — 96375 TX/PRO/DX INJ NEW DRUG ADDON: CPT

## 2025-02-17 PROCEDURE — 99285 EMERGENCY DEPT VISIT HI MDM: CPT

## 2025-02-17 PROCEDURE — 86880 COOMBS TEST DIRECT: CPT

## 2025-02-17 PROCEDURE — 83605 ASSAY OF LACTIC ACID: CPT

## 2025-02-17 PROCEDURE — 87040 BLOOD CULTURE FOR BACTERIA: CPT

## 2025-02-17 PROCEDURE — 76705 ECHO EXAM OF ABDOMEN: CPT

## 2025-02-17 PROCEDURE — 82607 VITAMIN B-12: CPT

## 2025-02-17 PROCEDURE — 93010 ELECTROCARDIOGRAM REPORT: CPT

## 2025-02-17 PROCEDURE — 84484 ASSAY OF TROPONIN QUANT: CPT

## 2025-02-17 PROCEDURE — 0225U NFCT DS DNA&RNA 21 SARSCOV2: CPT

## 2025-02-17 PROCEDURE — 84132 ASSAY OF SERUM POTASSIUM: CPT

## 2025-02-17 PROCEDURE — 87641 MR-STAPH DNA AMP PROBE: CPT

## 2025-02-17 PROCEDURE — 74176 CT ABD & PELVIS W/O CONTRAST: CPT | Mod: MC

## 2025-02-17 PROCEDURE — 84466 ASSAY OF TRANSFERRIN: CPT

## 2025-02-17 PROCEDURE — 80061 LIPID PANEL: CPT

## 2025-02-17 PROCEDURE — 80048 BASIC METABOLIC PNL TOTAL CA: CPT

## 2025-02-17 PROCEDURE — 82728 ASSAY OF FERRITIN: CPT

## 2025-02-17 PROCEDURE — 99261: CPT

## 2025-02-17 PROCEDURE — 90935 HEMODIALYSIS ONE EVALUATION: CPT

## 2025-02-17 PROCEDURE — 85730 THROMBOPLASTIN TIME PARTIAL: CPT

## 2025-02-17 PROCEDURE — 86705 HEP B CORE ANTIBODY IGM: CPT

## 2025-02-17 PROCEDURE — 84100 ASSAY OF PHOSPHORUS: CPT

## 2025-02-17 PROCEDURE — 86704 HEP B CORE ANTIBODY TOTAL: CPT

## 2025-02-17 PROCEDURE — 82962 GLUCOSE BLOOD TEST: CPT

## 2025-02-17 PROCEDURE — 86850 RBC ANTIBODY SCREEN: CPT

## 2025-02-17 PROCEDURE — 86706 HEP B SURFACE ANTIBODY: CPT

## 2025-02-17 PROCEDURE — 85610 PROTHROMBIN TIME: CPT

## 2025-02-17 PROCEDURE — 85014 HEMATOCRIT: CPT

## 2025-02-17 PROCEDURE — 87640 STAPH A DNA AMP PROBE: CPT

## 2025-02-17 PROCEDURE — 83880 ASSAY OF NATRIURETIC PEPTIDE: CPT

## 2025-02-17 PROCEDURE — 83550 IRON BINDING TEST: CPT

## 2025-02-17 PROCEDURE — 96365 THER/PROPH/DIAG IV INF INIT: CPT

## 2025-02-17 PROCEDURE — 83690 ASSAY OF LIPASE: CPT

## 2025-02-17 PROCEDURE — 86870 RBC ANTIBODY IDENTIFICATION: CPT

## 2025-02-17 PROCEDURE — 85027 COMPLETE CBC AUTOMATED: CPT

## 2025-02-17 PROCEDURE — 82803 BLOOD GASES ANY COMBINATION: CPT

## 2025-02-17 PROCEDURE — 82330 ASSAY OF CALCIUM: CPT

## 2025-02-17 PROCEDURE — 86803 HEPATITIS C AB TEST: CPT

## 2025-02-17 PROCEDURE — 36415 COLL VENOUS BLD VENIPUNCTURE: CPT

## 2025-02-17 PROCEDURE — 86901 BLOOD TYPING SEROLOGIC RH(D): CPT

## 2025-02-17 PROCEDURE — 83036 HEMOGLOBIN GLYCOSYLATED A1C: CPT

## 2025-02-17 PROCEDURE — 94640 AIRWAY INHALATION TREATMENT: CPT

## 2025-02-17 PROCEDURE — 84295 ASSAY OF SERUM SODIUM: CPT

## 2025-02-17 PROCEDURE — 86900 BLOOD TYPING SEROLOGIC ABO: CPT

## 2025-02-17 PROCEDURE — 80053 COMPREHEN METABOLIC PANEL: CPT

## 2025-02-17 RX ORDER — DIPHENOXYLATE HYDROCHLORIDE AND ATROPINE SULFATE .025; 2.5 MG/1; MG/1
2 TABLET ORAL ONCE
Refills: 0 | Status: DISCONTINUED | OUTPATIENT
Start: 2025-02-17 | End: 2025-02-17

## 2025-02-17 RX ORDER — DIPHENOXYLATE HYDROCHLORIDE AND ATROPINE SULFATE .025; 2.5 MG/1; MG/1
1 TABLET ORAL
Qty: 0 | Refills: 0 | DISCHARGE
Start: 2025-02-17

## 2025-02-17 RX ORDER — LEVOFLOXACIN 25 MG/ML
1 SOLUTION ORAL
Qty: 3 | Refills: 0
Start: 2025-02-17 | End: 2025-02-19

## 2025-02-17 RX ADMIN — AMLODIPINE BESYLATE 10 MILLIGRAM(S): 10 TABLET ORAL at 08:27

## 2025-02-17 RX ADMIN — LEVETIRACETAM 500 MILLIGRAM(S): 10 INJECTION, SOLUTION INTRAVENOUS at 07:31

## 2025-02-17 RX ADMIN — Medication 0.3 MILLIGRAM(S): at 08:27

## 2025-02-17 RX ADMIN — ISOSORBIDE MONONITRATE 60 MILLIGRAM(S): 60 TABLET, EXTENDED RELEASE ORAL at 11:18

## 2025-02-17 RX ADMIN — Medication 20 MILLIGRAM(S): at 08:27

## 2025-02-17 RX ADMIN — CARVEDILOL 6.25 MILLIGRAM(S): 3.12 TABLET, FILM COATED ORAL at 08:27

## 2025-02-17 RX ADMIN — Medication 100 MILLIGRAM(S): at 08:28

## 2025-02-17 RX ADMIN — Medication 40 MILLIGRAM(S): at 07:31

## 2025-02-17 RX ADMIN — Medication 500 MILLILITER(S): at 05:54

## 2025-02-17 RX ADMIN — DIPHENOXYLATE HYDROCHLORIDE AND ATROPINE SULFATE 2 TABLET(S): .025; 2.5 TABLET ORAL at 11:18

## 2025-02-17 NOTE — DISCHARGE NOTE PROVIDER - ATTENDING DISCHARGE PHYSICAL EXAMINATION:
T(C): 36.4 (02-17-25 @ 08:11), Max: 36.9 (02-16-25 @ 11:11)  HR: 115 (02-17-25 @ 08:11) (65 - 127)  BP: 132/78 (02-17-25 @ 08:11) (85/50 - 161/93)  RR: 18 (02-17-25 @ 08:11) (18 - 22)  SpO2: 98% (02-17-25 @ 08:11) (87% - 100%)    CONSTITUTIONAL: Well groomed, no apparent distress  EYES: PERRLA and symmetric, EOMI, No conjunctival or scleral injection, non-icteric  ENMT: Oral mucosa with moist membranes. Normal dentition; no pharyngeal injection or exudates             NECK: Supple, symmetric and without tracheal deviation   RESP: No respiratory distress, no use of accessory muscles; CTA b/l, no WRR  CV: RRR, +S1S2, no MRG; no JVD; no peripheral edema  GI: Soft, NT, ND, no rebound, no guarding; no palpable masses; no hepatosplenomegaly; no hernia palpated  LYMPH: No cervical LAD or tenderness; no axillary LAD or tenderness; no inguinal LAD or tenderness  MSK: LUE AVF in place w thrill  SKIN: No rashes or ulcers noted; no subcutaneous nodules or induration palpable  NEURO: CN II-XII intact; normal reflexes in upper and lower extremities, sensation intact in upper and lower extremities b/l to light touch   PSYCH: Appropriate insight/judgment; A+O x 3, mood and affect appropriate, recent/remote memory intact

## 2025-02-17 NOTE — PROGRESS NOTE ADULT - ASSESSMENT
65 y/o M w/ PMH of ESRD (on HD tu/th/sa), Afib (not on AC 2/2 prior GIB now s/p watchman), CAD s/p CABG, aortic and mitral valve replacement, Type A aortic dissection s/p repair c/b ischemic bowel s/p resection, seizure disorder, former substance use disorder recently hospitalized 1/17-1/18 for PNA and d/c'd on doxy presented w/ SOB w/ associated productive cough, NBNB emesis and subjective fevers for the past 2 days.  Pt states mucus is thick and green.  He has also had generalized weakness and was unable to go to HD yesterday.  Pt has had no further episodes of vomiting and denies diarrhea recent travel, dysuria, recent sick contacts.      Missed HD Saturday    HD soon - arranged

## 2025-02-17 NOTE — DISCHARGE NOTE NURSING/CASE MANAGEMENT/SOCIAL WORK - PATIENT PORTAL LINK FT
You can access the FollowMyHealth Patient Portal offered by [facility] by registering at the following website: [email or web address]. By joining [employer]’s FollowMyHealth portal, you will also be able to view your health information using other applications (apps) compatible with our system.

## 2025-02-17 NOTE — DISCHARGE NOTE PROVIDER - NSDCCPCAREPLAN_GEN_ALL_CORE_FT
PRINCIPAL DISCHARGE DIAGNOSIS  Diagnosis: Acute hypoxic respiratory failure  Assessment and Plan of Treatment: -started on cefepime for CAP  -will be discharged on oral antibiotics

## 2025-02-17 NOTE — DISCHARGE NOTE PROVIDER - NSDCMRMEDTOKEN_GEN_ALL_CORE_FT
acetaminophen 325 mg oral tablet: 2 tab(s) orally every 6 hours As needed Temp greater or equal to 38C (100.4F), Mild Pain (1 - 3)  amLODIPine 10 mg oral tablet: 1 tab(s) orally once a day  atorvastatin 40 mg oral tablet: 1 tab(s) orally once a day (at bedtime)  carvedilol 6.25 mg oral tablet: 1 tab(s) orally 2 times a day  cloNIDine 0.3 mg oral tablet: 1 tab(s) orally 2 times a day  hydrALAZINE 100 mg oral tablet: 1 tab(s) orally 2 times a day  isosorbide mononitrate 60 mg oral tablet, extended release: 1 tab(s) orally once a day  levETIRAcetam 500 mg oral tablet: 1 tab(s) orally 2 times a day  levoFLOXacin 750 mg oral tablet: 1 tab(s) orally once a day take from 02/18-02/20  Lomotil 2.5 mg-0.025 mg oral tablet: 1 tab(s) orally once a day  pantoprazole 40 mg oral delayed release tablet: 1 tab(s) orally once a day (before a meal)  sevelamer carbonate 800 mg oral tablet: 2 tab(s) orally 3 times a day (with meals)  tamsulosin 0.4 mg oral capsule: 1 cap(s) orally once a day (at bedtime)  torsemide 20 mg oral tablet: 1 tab(s) orally once a day

## 2025-02-17 NOTE — PROGRESS NOTE ADULT - SUBJECTIVE AND OBJECTIVE BOX
Patient seen and examined    Feels sl beter,   no c/o CP SOB NV   No swelling feet    Vital Signs Last 24 Hrs  T(C): 36.4 (17 Feb 2025 18:07), Max: 36.8 (17 Feb 2025 06:30)  T(F): 97.5 (17 Feb 2025 18:07), Max: 98.2 (17 Feb 2025 06:30)  HR: 99 (17 Feb 2025 18:07) (68 - 115)  BP: 119/72 (17 Feb 2025 18:07) (85/50 - 132/78)  BP(mean): --  RR: 18 (17 Feb 2025 18:07) (18 - 18)  SpO2: 96% (17 Feb 2025 18:07) (92% - 98%)    Parameters below as of 17 Feb 2025 18:07  Patient On (Oxygen Delivery Method): room air        PHYSICAL EXAM    GENERAL: NAD, thin  EYES:  conjunctiva and sclera clear  NECK: Supple, No JVD/Bruit  NERVOUS SYSTEM:  A/O x3,   CHEST:  No rales, No rhonchi  HEART:  RRR, No murmur  ABDOMEN: Soft, NT/ND BS+  EXTREMITIES:  No Edema;  SKIN: No rashes    17 Feb 2025 04:29    141    |  104    |  81.8   ----------------------------<  81     4.4     |  19.0   |  7.51     Ca    8.0        17 Feb 2025 04:29  Phos  6.8       17 Feb 2025 04:29  Mg     1.9       17 Feb 2025 04:29    TPro  5.8    /  Alb  3.3    /  TBili  1.2    /  DBili  x      /  AST  12     /  ALT  8      /  AlkPhos  69     17 Feb 2025 04:29                          8.7    5.14  )-----------( 76       ( 17 Feb 2025 04:29 )             28.0         Continue present treatment

## 2025-02-17 NOTE — DISCHARGE NOTE NURSING/CASE MANAGEMENT/SOCIAL WORK - NSDCVIVACCINE_GEN_ALL_CORE_FT
influenza, injectable, quadrivalent, preservative free; 12-Dec-2020 09:28; Lynn Gerardo); [employer]; [id number] (Exp. Date: 30-Jun-2021); IntraMuscular; Deltoid Right.; 0.5 milliLiter(s); VIS (VIS Published: 15-Aug-2019, VIS Presented: 12-Dec-2020);

## 2025-02-17 NOTE — DISCHARGE NOTE PROVIDER - NSDCFUSCHEDAPPT_GEN_ALL_CORE_FT
Brianne To  [facility] Physician Counts include 234 beds at the [facility]  CARDIOLOGY [address]  Scheduled Appointment: 02/20/2025

## 2025-02-17 NOTE — DISCHARGE NOTE NURSING/CASE MANAGEMENT/SOCIAL WORK - FINANCIAL ASSISTANCE
[facility] provides services at a reduced cost to those who are determined to be eligible through [facility]’s financial assistance program. Information regarding [facility]’s financial assistance program can be found by going to [email or web address] or by calling [phone].

## 2025-02-17 NOTE — DISCHARGE NOTE PROVIDER - NSDCFUADDINST_GEN_ALL_CORE_FT
Please take your medications as prescribed to you.  Please make sure to attend your clinic appointments.

## 2025-02-17 NOTE — DISCHARGE NOTE PROVIDER - NSDCFUADDAPPT_GEN_ALL_CORE_FT
APPTS ARE READY TO BE MADE: [X] YES    Best Family or Patient Contact (if needed):    Additional Information about above appointments (if needed):    1: primary care within 10 days  2:   3:     Other comments or requests:

## 2025-02-17 NOTE — DISCHARGE NOTE PROVIDER - HOSPITAL COURSE
Admission HPI  65 y/o M w/ PMH of ESRD (on HD tu/th/sa), Afib (not on AC 2/2 prior GIB now s/p watchman), CAD s/p CABG, aortic and mitral valve replacement, Type A aortic dissection s/p repair c/b ischemic bowel s/p resection, seizure disorder, former substance use disorder recently hospitalized 1/17-1/18 for PNA and d/c'd on doxy presented w/ SOB w/ associated productive cough, NBNB emesis and subjective fevers for the past 2 days.  Pt states mucus is thick and green.  He has also had generalized weakness and was unable to go to HD yesterday.  Pt has had no further episodes of vomiting and denies diarrhea recent travel, dysuria, recent sick contacts.  Pt denies smoking/vaping.  He denies cp, palpitations, abd pain, LE edema, orthopnea.      ---    During patients hospital stay he was started on cefepime. Patient required NC for a short period while in hospital.  On hospital day 2 patient on room air and no longer with any respiratory issues.  Patient evaluated by cardiology for elevated troponin, which likely was due to ESRD missing HD with ongoing respiratory infection.  Patient seen by nephrology in hospital - had dialysis session in hospital.    Patient is stable for discharge to home, he will finish course of oral antibiotics.  No changes being made to his home medications.

## 2025-02-17 NOTE — CHART NOTE - NSCHARTNOTEFT_GEN_A_CORE
Contacted by RN for BP 85/50 this AM. Pt currently asx, mentating at baseline. Remainder of VSS, NAD. Pt with hx of ESRD (HD on T/TH, S), HTN, HLD, Afib (not on AC 2/2 prior GIB now s/p watchman), CAD s/p CABG, aortic and mitral valve replacement, Type A aortic dissection s/p repair c/b ischemic bowel s/p resection, seizure disorder, former substance use - presented to ED with generalized weakness and unable to go to HD, admitted for AHRF complicated by AGMA, sepsis, BNP >60K. CBC this AM with drop in H/H 10.4 -> 8.7, will repeat CBC w/ T&S. Ordered 250 NS IVF bolus, RN to repeat BP s/p bolus. Continue to monitor, call PA for any changes in pt condition. Contacted by RN for BP 85/50 this AM. Pt currently asx, mentating at baseline. Remainder of VSS, NAD. Pt with hx of ESRD (HD on T/TH, S), HTN, HLD, Afib (not on AC 2/2 prior GIB now s/p watchman), CAD s/p CABG, aortic and mitral valve replacement, Type A aortic dissection s/p repair c/b ischemic bowel s/p resection, seizure disorder, former substance use - presented to ED with generalized weakness and unable to go to HD, admitted for AHRF complicated by AGMA, sepsis, BNP >60K. CBC this AM with drop in H/H 10.4 -> 8.7, will repeat CBC w/ T&S. Ordered 250 NS IVF bolus, RN to repeat BP s/p bolus. Continue to monitor, call PA for any changes in pt condition.    -- Addendum --  Repeat /71  F/u CBC

## 2025-02-21 LAB
CULTURE RESULTS: SIGNIFICANT CHANGE UP
SPECIMEN SOURCE: SIGNIFICANT CHANGE UP

## 2025-03-04 ENCOUNTER — NON-APPOINTMENT (OUTPATIENT)
Age: 65
End: 2025-03-04

## 2025-03-04 ENCOUNTER — APPOINTMENT (OUTPATIENT)
Dept: CARDIOLOGY | Facility: CLINIC | Age: 65
End: 2025-03-04
Payer: MEDICARE

## 2025-03-04 VITALS
OXYGEN SATURATION: 96 % | SYSTOLIC BLOOD PRESSURE: 136 MMHG | HEIGHT: 68 IN | HEART RATE: 49 BPM | WEIGHT: 141 LBS | DIASTOLIC BLOOD PRESSURE: 56 MMHG | BODY MASS INDEX: 21.37 KG/M2

## 2025-03-04 DIAGNOSIS — N18.9 CHRONIC KIDNEY DISEASE, UNSPECIFIED: ICD-10-CM

## 2025-03-04 DIAGNOSIS — I42.9 CARDIOMYOPATHY, UNSPECIFIED: ICD-10-CM

## 2025-03-04 DIAGNOSIS — I48.91 UNSPECIFIED ATRIAL FIBRILLATION: ICD-10-CM

## 2025-03-04 DIAGNOSIS — I25.10 ATHEROSCLEROTIC HEART DISEASE OF NATIVE CORONARY ARTERY W/OUT ANGINA PECTORIS: ICD-10-CM

## 2025-03-04 PROCEDURE — 93000 ELECTROCARDIOGRAM COMPLETE: CPT

## 2025-03-04 PROCEDURE — 99214 OFFICE O/P EST MOD 30 MIN: CPT

## 2025-03-04 PROCEDURE — 99204 OFFICE O/P NEW MOD 45 MIN: CPT

## 2025-03-04 PROCEDURE — G2211 COMPLEX E/M VISIT ADD ON: CPT

## 2025-03-04 NOTE — ED PROCEDURE NOTE - EBL
How Many Mls Were Removed From The 40 Mg/Ml (5ml) Vial When Preparing The Injectable Solution?: 0 Kenalog Preparation: Kenalog Detail Level: Simple Validate Note Data When Using Inventory: Yes Total Volume (Ccs): 0.4 Medical Necessity Clause: This procedure was medically necessary because the lesions that were treated were: Administered By (Optional): Dr. Newton Kenalog Type Of Vial: Multiple Dose Consent: The risks of atrophy were reviewed with the patient. Show Inventory Tab: Hide Require Ndc Code?: No minimal Concentration Of Kenalog Solution Injected (Mg/Ml): 40.0

## 2025-03-18 ENCOUNTER — INPATIENT (INPATIENT)
Facility: HOSPITAL | Age: 65
LOS: 2 days | Discharge: HOME | DRG: 314 | End: 2025-03-21
Attending: STUDENT IN AN ORGANIZED HEALTH CARE EDUCATION/TRAINING PROGRAM | Admitting: HOSPITALIST
Payer: MEDICARE

## 2025-03-18 VITALS
HEIGHT: 66 IN | SYSTOLIC BLOOD PRESSURE: 195 MMHG | WEIGHT: 145.73 LBS | TEMPERATURE: 97 F | DIASTOLIC BLOOD PRESSURE: 88 MMHG | RESPIRATION RATE: 18 BRPM | OXYGEN SATURATION: 98 % | HEART RATE: 61 BPM

## 2025-03-18 DIAGNOSIS — Z95.1 PRESENCE OF AORTOCORONARY BYPASS GRAFT: Chronic | ICD-10-CM

## 2025-03-18 DIAGNOSIS — Z90.49 ACQUIRED ABSENCE OF OTHER SPECIFIED PARTS OF DIGESTIVE TRACT: Chronic | ICD-10-CM

## 2025-03-18 DIAGNOSIS — Z95.2 PRESENCE OF PROSTHETIC HEART VALVE: Chronic | ICD-10-CM

## 2025-03-18 DIAGNOSIS — Z86.79 PERSONAL HISTORY OF OTHER DISEASES OF THE CIRCULATORY SYSTEM: Chronic | ICD-10-CM

## 2025-03-18 DIAGNOSIS — T82.9XXA UNSPECIFIED COMPLICATION OF CARDIAC AND VASCULAR PROSTHETIC DEVICE, IMPLANT AND GRAFT, INITIAL ENCOUNTER: ICD-10-CM

## 2025-03-18 DIAGNOSIS — I77.0 ARTERIOVENOUS FISTULA, ACQUIRED: Chronic | ICD-10-CM

## 2025-03-18 DIAGNOSIS — Z95.818 PRESENCE OF OTHER CARDIAC IMPLANTS AND GRAFTS: Chronic | ICD-10-CM

## 2025-03-18 DIAGNOSIS — Z94.0 KIDNEY TRANSPLANT STATUS: Chronic | ICD-10-CM

## 2025-03-18 LAB
ALBUMIN SERPL ELPH-MCNC: 4.2 G/DL — SIGNIFICANT CHANGE UP (ref 3.3–5.2)
ALP SERPL-CCNC: 101 U/L — SIGNIFICANT CHANGE UP (ref 40–120)
ALT FLD-CCNC: 9 U/L — SIGNIFICANT CHANGE UP
ANION GAP SERPL CALC-SCNC: 21 MMOL/L — HIGH (ref 5–17)
APTT BLD: 38.3 SEC — HIGH (ref 24.5–35.6)
AST SERPL-CCNC: 16 U/L — SIGNIFICANT CHANGE UP
BASOPHILS # BLD AUTO: 0.04 K/UL — SIGNIFICANT CHANGE UP (ref 0–0.2)
BASOPHILS NFR BLD AUTO: 0.9 % — SIGNIFICANT CHANGE UP (ref 0–2)
BLD GP AB SCN SERPL QL: SIGNIFICANT CHANGE UP
BUN SERPL-MCNC: 91.6 MG/DL — HIGH (ref 8–20)
CALCIUM SERPL-MCNC: 8.9 MG/DL — SIGNIFICANT CHANGE UP (ref 8.4–10.5)
CHLORIDE SERPL-SCNC: 96 MMOL/L — SIGNIFICANT CHANGE UP (ref 96–108)
CO2 SERPL-SCNC: 22 MMOL/L — SIGNIFICANT CHANGE UP (ref 22–29)
CREAT SERPL-MCNC: 8.42 MG/DL — HIGH (ref 0.5–1.3)
DIR ANTIGLOB POLYSPECIFIC INTERPRETATION: SIGNIFICANT CHANGE UP
EGFR: 7 ML/MIN/1.73M2 — LOW
EGFR: 7 ML/MIN/1.73M2 — LOW
EOSINOPHIL # BLD AUTO: 0.35 K/UL — SIGNIFICANT CHANGE UP (ref 0–0.5)
EOSINOPHIL NFR BLD AUTO: 7.4 % — HIGH (ref 0–6)
GLUCOSE SERPL-MCNC: 84 MG/DL — SIGNIFICANT CHANGE UP (ref 70–99)
HCT VFR BLD CALC: 32.1 % — LOW (ref 39–50)
HGB BLD-MCNC: 9.9 G/DL — LOW (ref 13–17)
IMM GRANULOCYTES # BLD AUTO: 0.01 K/UL — SIGNIFICANT CHANGE UP (ref 0–0.07)
IMM GRANULOCYTES NFR BLD AUTO: 0.2 % — SIGNIFICANT CHANGE UP (ref 0–0.9)
INR BLD: 1.28 RATIO — HIGH (ref 0.85–1.16)
LYMPHOCYTES # BLD AUTO: 1.09 K/UL — SIGNIFICANT CHANGE UP (ref 1–3.3)
LYMPHOCYTES NFR BLD AUTO: 23.2 % — SIGNIFICANT CHANGE UP (ref 13–44)
MAGNESIUM SERPL-MCNC: 2.2 MG/DL — SIGNIFICANT CHANGE UP (ref 1.6–2.6)
MCHC RBC-ENTMCNC: 30.8 G/DL — LOW (ref 32–36)
MCHC RBC-ENTMCNC: 31 PG — SIGNIFICANT CHANGE UP (ref 27–34)
MCV RBC AUTO: 100.6 FL — HIGH (ref 80–100)
MONOCYTES # BLD AUTO: 0.49 K/UL — SIGNIFICANT CHANGE UP (ref 0–0.9)
MONOCYTES NFR BLD AUTO: 10.4 % — SIGNIFICANT CHANGE UP (ref 2–14)
NEUTROPHILS # BLD AUTO: 2.72 K/UL — SIGNIFICANT CHANGE UP (ref 1.8–7.4)
NEUTROPHILS NFR BLD AUTO: 57.9 % — SIGNIFICANT CHANGE UP (ref 43–77)
NRBC # BLD AUTO: 0 K/UL — SIGNIFICANT CHANGE UP (ref 0–0)
NRBC # FLD: 0 K/UL — SIGNIFICANT CHANGE UP (ref 0–0)
NRBC BLD AUTO-RTO: 0 /100 WBCS — SIGNIFICANT CHANGE UP (ref 0–0)
PHOSPHATE SERPL-MCNC: 7.4 MG/DL — HIGH (ref 2.4–4.7)
PLATELET # BLD AUTO: 111 K/UL — LOW (ref 150–400)
PMV BLD: 11.3 FL — SIGNIFICANT CHANGE UP (ref 7–13)
POTASSIUM SERPL-MCNC: 5.6 MMOL/L — HIGH (ref 3.5–5.3)
POTASSIUM SERPL-SCNC: 5.6 MMOL/L — HIGH (ref 3.5–5.3)
PROT SERPL-MCNC: 7.1 G/DL — SIGNIFICANT CHANGE UP (ref 6.6–8.7)
PROTHROM AB SERPL-ACNC: 14.4 SEC — HIGH (ref 9.9–13.4)
RBC # BLD: 3.19 M/UL — LOW (ref 4.2–5.8)
RBC # FLD: 14.5 % — SIGNIFICANT CHANGE UP (ref 10.3–14.5)
SODIUM SERPL-SCNC: 139 MMOL/L — SIGNIFICANT CHANGE UP (ref 135–145)
WBC # BLD: 4.7 K/UL — SIGNIFICANT CHANGE UP (ref 3.8–10.5)
WBC # FLD AUTO: 4.7 K/UL — SIGNIFICANT CHANGE UP (ref 3.8–10.5)

## 2025-03-18 PROCEDURE — 93990 DOPPLER FLOW TESTING: CPT | Mod: 26

## 2025-03-18 PROCEDURE — 99285 EMERGENCY DEPT VISIT HI MDM: CPT

## 2025-03-18 PROCEDURE — 99223 1ST HOSP IP/OBS HIGH 75: CPT | Mod: GC

## 2025-03-18 PROCEDURE — 93010 ELECTROCARDIOGRAM REPORT: CPT

## 2025-03-18 RX ORDER — MELATONIN 5 MG
3 TABLET ORAL AT BEDTIME
Refills: 0 | Status: DISCONTINUED | OUTPATIENT
Start: 2025-03-18 | End: 2025-03-21

## 2025-03-18 RX ORDER — SODIUM POLYSTYRENE SULFONATE 15 G/60ML
15 SUSPENSION ORAL; RECTAL ONCE
Refills: 0 | Status: COMPLETED | OUTPATIENT
Start: 2025-03-18 | End: 2025-03-18

## 2025-03-18 RX ORDER — ONDANSETRON HCL/PF 4 MG/2 ML
4 VIAL (ML) INJECTION ONCE
Refills: 0 | Status: COMPLETED | OUTPATIENT
Start: 2025-03-18 | End: 2025-03-18

## 2025-03-18 RX ORDER — ACETAMINOPHEN 500 MG/5ML
1000 LIQUID (ML) ORAL ONCE
Refills: 0 | Status: COMPLETED | OUTPATIENT
Start: 2025-03-18 | End: 2025-03-18

## 2025-03-18 RX ORDER — MAGNESIUM, ALUMINUM HYDROXIDE 200-200 MG
30 TABLET,CHEWABLE ORAL EVERY 4 HOURS
Refills: 0 | Status: DISCONTINUED | OUTPATIENT
Start: 2025-03-18 | End: 2025-03-21

## 2025-03-18 RX ORDER — ACETAMINOPHEN 500 MG/5ML
650 LIQUID (ML) ORAL EVERY 6 HOURS
Refills: 0 | Status: DISCONTINUED | OUTPATIENT
Start: 2025-03-18 | End: 2025-03-21

## 2025-03-18 RX ORDER — ONDANSETRON HCL/PF 4 MG/2 ML
4 VIAL (ML) INJECTION EVERY 8 HOURS
Refills: 0 | Status: DISCONTINUED | OUTPATIENT
Start: 2025-03-18 | End: 2025-03-21

## 2025-03-18 RX ADMIN — Medication 4 MILLIGRAM(S): at 22:09

## 2025-03-18 RX ADMIN — Medication 400 MILLIGRAM(S): at 19:10

## 2025-03-18 RX ADMIN — SODIUM POLYSTYRENE SULFONATE 15 GRAM(S): 15 SUSPENSION ORAL; RECTAL at 22:09

## 2025-03-18 NOTE — ED ADULT NURSE NOTE - NSFALLUNIVINTERV_ED_ALL_ED
Bed/Stretcher in lowest position, wheels locked, appropriate side rails in place/Call bell, personal items and telephone in reach/Instruct patient to call for assistance before getting out of bed/chair/stretcher/Non-slip footwear applied when patient is off stretcher/[place] to call system/Physically safe environment - no spills, clutter or unnecessary equipment/Purposeful proactive rounding/Room/bathroom lighting operational, light cord in reach

## 2025-03-18 NOTE — ED ADULT NURSE NOTE - OBJECTIVE STATEMENT
A&ox3,4 received dialysis Tues/Thurs/Saturday presents to ED requests dialysis. Last dialysis 5 days ago. Pt also has bruising on L ribcage with pain from fistula graft. Breathing is spontaneous even and unlabored. Pt is connected to cardiac monitor and pulse ox. Denies SOB, CP, n/c, dizziness. Bed is in lowest position and side rails up. Safety precautions maintained. 20g Iv right Ac.

## 2025-03-18 NOTE — ED ADULT NURSE REASSESSMENT NOTE - NS ED NURSE REASSESS COMMENT FT1
Patient received laying on stretcher A&Ox 3, breathing with ease, no signs of acute distress, no complaints at this, IV patent and intact, will continue to monitor and assess

## 2025-03-18 NOTE — ED ADULT TRIAGE NOTE - CHIEF COMPLAINT QUOTE
A&O x 4 received dialysis Tues/Thurs/Saturday presents to ED requests dialysis. Last dialysis 5 days ago. Pt also has bruising on L ribcage.

## 2025-03-18 NOTE — H&P ADULT - HISTORY OF PRESENT ILLNESS
Mr. Cummins is 63 y/o M w/ PMHx of ESRD (on HD tu/th/sa), Afib (not on AC 2/2 prior GIB now s/p watchman), CAD s/p CABG, former substance use disorder, Mitral valve infective endocarditis (2023), aortic and mitral valve replacement, Type A aortic dissection s/p repair c/b ischemic bowel s/p resection, seizure disorder, and recent hospital admission  for PNA (01/2025) who is presenting the [facility] ED after he was informed his left upper extremity fistula was not accessible when presenting for HD today, reports last HD was 5 days PTA. Patient reports that after his last HD session he had significant edema and pain from his fistula site, was counseled it should self resolve. At time of exam in ED, patient without any complaints.      Mr. Cummins is 65 y/o M w/ PMHx of ESRD (on HD tu/th/sa), Afib (not on AC 2/2 prior GIB now s/p watchman), CAD s/p CABG, former substance use disorder, Mitral valve infective endocarditis (2023), aortic and mitral valve replacement, Type A aortic dissection s/p repair c/b ischemic bowel s/p resection, seizure disorder, and recent hospital admission  for PNA (01/2025) who is presenting the [facility] ED after he was informed his left upper extremity fistula was not accessible when presenting for HD today. Patient reports that after HD on 03/11 he developed pain and swelling from his LUE fistula which he was counseled would resolved on its own. On 03/13 , he was able to complete dialysis, but reports that access of the fistula was painful and the swelling worsened. When he presented today (03/18), patient was counseled to go to ED.  He mentions that he has swelling from his L arm extending down to his R flank, which has continued to progressively worsen. He complain of L upper extremity pain, which is intermittently throbbing in nature and is tender to palpation. Denies HA, DZ, Vision Changes, Sore throat, cough, CP, SOB, Abdominal pain, diarrhea, constipation or dysuria. Patient reports he makes urine regularly and without difficulty. Patient recently restarted HD, reports that in 2003 when he had endocarditis he was resumed on HD, he had renal recovery for 3 years prior to that admission. Reports total 3-4 years on HD.   Patient seen and examined before midnight.     Mr. Cummins is 63 y/o M w/ PMHx of ESRD (on HD tu/th/sa), Afib (not on AC 2/2 prior GIB now s/p watchman), CAD s/p CABG, former substance use disorder, Mitral valve infective endocarditis (2023), aortic and mitral valve replacement, Type A aortic dissection s/p repair c/b ischemic bowel s/p resection, seizure disorder, and recent hospital admission  for PNA (01/2025) who is presenting the [facility] ED after he was informed his left upper extremity fistula was not accessible when presenting for HD today. Patient reports that after HD on 03/11 he developed pain and swelling from his LUE fistula which he was counseled would resolved on its own. On 03/13 , he was able to complete dialysis, but reports that access of the fistula was painful and the swelling worsened. When he presented today (03/18), patient was counseled to go to ED.  He mentions that he has swelling from his L arm extending down to his R flank, which has continued to progressively worsen. He complain of L upper extremity pain, which is intermittently throbbing in nature and is tender to palpation. Denies HA, DZ, Vision Changes, Sore throat, cough, CP, SOB, Abdominal pain, diarrhea, constipation or dysuria. Patient reports he makes urine regularly and without difficulty. Patient recently restarted HD, reports that in 2003 when he had endocarditis he was resumed on HD, he had renal recovery for 3 years prior to that admission. Reports total 3-4 years on HD.

## 2025-03-18 NOTE — H&P ADULT - NSHPLABSRESULTS_GEN_ALL_CORE
LABS:                        9.9    4.70  )-----------( 111      ( 18 Mar 2025 18:30 )             32.1     03-18    139  |  96  |  91.6[H]  ----------------------------<  84  5.6[H]   |  22.0  |  8.42[H]    Ca    8.9      18 Mar 2025 18:30  Phos  7.4     03-18  Mg     2.2     03-18    TPro  7.1  /  Alb  4.2  /  TBili  1.2  /  DBili  x   /  AST  16  /  ALT  9   /  AlkPhos  101  03-18    PT/INR - ( 18 Mar 2025 18:30 )   PT: 14.4 sec;   INR: 1.28 ratio      PTT - ( 18 Mar 2025 18:30 )  PTT:38.3 sec  Urinalysis Basic - ( 18 Mar 2025 18:30 )    Color: x / Appearance: x / SG: x / pH: x  Gluc: 84 mg/dL / Ketone: x  / Bili: x / Urobili: x   Blood: x / Protein: x / Nitrite: x   Leuk Esterase: x / RBC: x / WBC x   Sq Epi: x / Non Sq Epi: x / Bacteria: x    RADIOLOGY & ADDITIONAL TESTS:    < from: TTE W or WO Ultrasound Enhancing Agent (12.04.24 @ 08:51) >    CONCLUSIONS:      1. Left ventricularcavity is normal in size. Left ventricular systolic function is low normal with an ejection fraction of 55 % by Guadarrama's method of disks with an ejection fraction visually estimated at 50 to 55 %.   2. Reduced right ventricular systolic function.   3. Left atrium is normal in size.   4. Estimated pulmonary artery systolic pressure is 55 mmHg.   5. Moderate left ventricular hypertrophy.    < end of copied text >

## 2025-03-18 NOTE — H&P ADULT - ASSESSMENT
ASSESSMENT    Mr. Cummins is 65 y/o M w/ PMHx of ESRD (on HD tu/th/sa), Afib (not on AC 2/2 prior GIB now s/p watchman), CAD s/p CABG, former substance use disorder, Mitral valve infective endocarditis (2023), aortic and mitral valve replacement, Type A aortic dissection s/p repair c/b ischemic bowel s/p resection, seizure disorder, and recent hospital admission  for PNA (01/2025) admitted to [facility] for missed HD due to suspected fistula thrombosis.    PLAN    #Missed HD  #ESRD  #Fistula thrombosis    #seizure disorder    #Afib    #HTN    #HLD    #CAD    DVT ppx: SCDs  Dispo: 2-3 days pending establishing reliable dialysis access, likely home             ASSESSMENT    Mr. Cummins is 63 y/o M w/ PMHx of ESRD (on HD tu/th/sa), Afib (not on AC 2/2 prior GIB now s/p watchman), CAD s/p CABG, former substance use disorder, Mitral valve infective endocarditis (2023), aortic and mitral valve replacement, Type A aortic dissection s/p repair c/b ischemic bowel s/p resection, seizure disorder, and recent hospital admission  for PNA (01/2025) admitted to [facility] for missed HD due to suspected fistula thrombosis.    PLAN    #Missed HD  #ESRD  #Fistula thrombosis  - admit to medicine  - maintain tele/    #seizure disorder    #Afib    #HTN    #HLD    #CAD    DVT ppx: SCDs  Dispo: 2-3 days pending establishing reliable dialysis access, likely home             ASSESSMENT    Mr. Cummins is 65 y/o M w/ PMHx of ESRD (on HD tu/th/sa), Afib (not on AC 2/2 prior GIB now s/p watchman), CAD s/p CABG, former substance use disorder, Mitral valve infective endocarditis (2023), aortic and mitral valve replacement, Type A aortic dissection s/p repair c/b ischemic bowel s/p resection, seizure disorder, and recent hospital admission  for PNA (01/2025) admitted to [facility] for missed HD due to suspected fistula thrombosis.    PLAN    #Missed HD  #ESRD  #Fistula thrombosis  - admit to medicine  - maintain tele/  - LUE fistula access w/ faintly palpable thrill, TTP, and extensive ecchymosis  - F/u Fistula Ultrasound  - Vascular surgery consulted, further recs pending US results, possible dethrombosis vs permacath    #seizure disorder    #Afib    #HTN    #HLD    #CAD    DVT ppx: SCDs  Dispo: 2-3 days pending establishing reliable dialysis access, likely home             ASSESSMENT    Mr. Cummins is 65 y/o M w/ PMHx of ESRD (on HD tu/th/sa), Afib (not on AC 2/2 prior GIB now s/p watchman), CAD s/p CABG, former substance use disorder, Mitral valve infective endocarditis (2023), aortic and mitral valve replacement, Type A aortic dissection s/p repair c/b ischemic bowel s/p resection, seizure disorder, and recent hospital admission  for PNA (01/2025) admitted to [facility] for missed HD due to suspected fistula thrombosis.    PLAN    #Missed HD  #ESRD  #Fistula thrombosis  - admit to medicine  - maintain tele/  - LUE fistula access w/ faintly palpable thrill, TTP, and extensive ecchymosis  - F/u Fistula Ultrasound  - Vascular surgery consulted, further recs pending US results, possible dethrombosis vs permacath  - Last HD on 03/13, on T/Th/Sat schedule  - Nephro consulted for inpatient HD s/p dialysis access establishment  - Trend BMP  - continue sevelamer 1600 TID    #hyperkalemia  #hyperphosphatemia  - Trend BMP  - Trend ECG as derangements worsen pending HD    #seizure disorder  - continue home keppra 500 BID  -Seizure precautions    #Afib  - no AC d/t hx of GI bleed, s/p watchman  - continue home Carvedilol 6.25 BID    #anemia  - chronic likely 2/2 ESRD  - MCV slightly macrocytic  - f/u B12 and folate    #HTN  Continue home medications:  - amlodipine 10 QD  -Hydralazine 100 BID  - carvedilol 6.25 BID  - torsemide 20 QD  - clonidine .3 BID    #HLD  #CAD  - cw home atorvastatin 40 QD      DVT ppx: SCDs, start chem dvt ppx post surgery, patient ambulatory, encourage ambulation  Dispo: 2-3 days pending establishing reliable dialysis access, likely home             ASSESSMENT    Mr. Cummins is 63 y/o M w/ PMHx of ESRD (on HD tu/th/sa), Afib (not on AC 2/2 prior GIB now s/p watchman), CAD s/p CABG, former substance use disorder, Mitral valve infective endocarditis (2023), aortic and mitral valve replacement, Type A aortic dissection s/p repair c/b ischemic bowel s/p resection, seizure disorder, and recent hospital admission  for PNA (01/2025) admitted to [facility] for missed HD due to suspected fistula thrombosis.    PLAN    #Missed HD  #ESRD  #Fistula thrombosis  - admit to medicine  - maintain tele/  - LUE fistula access w/ faintly palpable thrill, TTP, and extensive ecchymosis  - F/u Fistula Ultrasound, will also obtain LUE doppler  - Vascular surgery following, further recs pending US results, possible dethrombosis vs permacath  - Last HD on 03/13, on T/Th/Sat schedule  - Nephro consulted for inpatient HD s/p dialysis access establishment  - Trend BMP  - continue sevelamer 1600 TID    #hyperkalemia  #hyperphosphatemia  - Trend BMP  - 1x Kayexalate   - Trend ECG as derangements worsen pending HD    #seizure disorder  - continue home keppra 500 BID  -Seizure precautions    #Afib  - no AC d/t hx of GI bleed, s/p watchman  - continue home Carvedilol 6.25 BID    #anemia  - chronic likely 2/2 ESRD  - MCV slightly macrocytic  - f/u B12 and folate    #HTN  Continue home medications:  - amlodipine 10 QD  -Hydralazine 100 BID  - carvedilol 6.25 BID  - torsemide 20 QD  - clonidine .3 BID    #HLD  #CAD  - cw home atorvastatin 40 QD      DVT ppx: SCDs, start chem dvt ppx post surgery, patient ambulatory, encourage ambulation  Dispo: 2-3 days pending establishing reliable dialysis access, likely home             ASSESSMENT    Mr. Cummins is 65 y/o M w/ PMHx of ESRD (on HD tu/th/sa), Afib (not on AC 2/2 prior GIB now s/p watchman), CAD s/p CABG, former substance use disorder, Mitral valve infective endocarditis (2023), aortic and mitral valve replacement, Type A aortic dissection s/p repair c/b ischemic bowel s/p resection, seizure disorder, and recent hospital admission  for PNA (01/2025) admitted to [facility] for missed HD due to suspected fistula thrombosis.    PLAN    #Missed HD  #ESRD  #Fistula thrombosis  - admit to medicine  - maintain tele/  - LUE fistula access w/ faintly palpable thrill, TTP, and extensive ecchymosis  - F/u Fistula Ultrasound, will also obtain LUE doppler in setting of ecchymosis from fistula to flank  - Vascular surgery following, further recs pending US results, possible dethrombosis vs permacath  - Last HD on 03/13, on T/Th/Sat schedule  - Nephro consulted for inpatient HD s/p dialysis access establishment  - Please  coordinate CT of LUE w/ contrast with HD timing  - Trend BMP  - continue sevelamer 1600 TID    #hyperkalemia  #hyperphosphatemia  - Trend BMP  - 1x Kayexalate   - Trend ECG as derangements worsen pending HD    #seizure disorder  - continue home keppra 500 BID  -Seizure precautions    #Afib  - no AC d/t hx of GI bleed, s/p watchman  - continue home Carvedilol 6.25 BID    #anemia  - chronic likely 2/2 ESRD  - MCV slightly macrocytic  - f/u B12 and folate    #HTN  Continue home medications:  - amlodipine 10 QD  -Hydralazine 100 BID  - carvedilol 6.25 BID  - torsemide 20 QD  - clonidine .3 BID    #HLD  #CAD  - cw home atorvastatin 40 QD      DVT ppx: SCDs, start chem dvt ppx post surgery, patient ambulatory, encourage ambulation  Dispo: 2-3 days pending establishing reliable dialysis access, likely home             ASSESSMENT    Mr. Cummins is 63 y/o M w/ PMHx of ESRD (on HD tu/th/sa), Afib (not on AC 2/2 prior GIB now s/p watchman), CAD s/p CABG, former substance use disorder, Mitral valve infective endocarditis (2023), aortic and mitral valve replacement, Type A aortic dissection s/p repair c/b ischemic bowel s/p resection, seizure disorder, and recent hospital admission  for PNA (01/2025) admitted to [facility] for missed HD due to suspected fistula thrombosis.    PLAN    #Missed HD  #ESRD  #Malfunctioning AV graft   - admit to medicine  - maintain tele/  - LUE fistula access w/ faintly palpable thrill, TTP, and extensive ecchymosis  - F/u Fistula Ultrasound, will also obtain LUE doppler in setting of ecchymosis from fistula to flank  - Vascular surgery following, further recs pending US results, possible dethrombosis vs permacath  - Last HD on 03/13, on T/Th/Sat schedule  - Nephro consulted for inpatient HD s/p dialysis access establishment  - Please  coordinate CT of LUE w/ contrast with HD timing  - Trend BMP  - continue sevelamer 1600 TID    #hyperkalemia  #hyperphosphatemia  - Trend BMP  - 1x Kayexalate   - Trend ECG as derangements worsen pending HD    #seizure disorder  - continue home keppra 500 BID  -Seizure precautions    #Afib  - no AC d/t hx of GI bleed, s/p watchman  - continue home Carvedilol 6.25 BID    #anemia  - chronic likely 2/2 ESRD  - MCV slightly macrocytic  - f/u B12 and folate    #HTN  Continue home medications:  - amlodipine 10 QD  -Hydralazine 100 BID  - carvedilol 6.25 BID  - torsemide 20 QD  - clonidine .3 BID    #HLD  #CAD  - cw home atorvastatin 40 QD      DVT ppx: SCDs, start chem dvt ppx post surgery, patient ambulatory, encourage ambulation  Dispo: when stable, likely home.

## 2025-03-18 NOTE — ED PROVIDER NOTE - IN ACCORDANCE WITH [place] LAW, WE OFFER EVERY PATIENT A HEPATITIS C TEST. WOULD YOU LIKE TO BE TESTED TODAY?
Please verify if patient needs appt due to constipation  appt was given at 4073    Previously negative

## 2025-03-18 NOTE — ED PROVIDER NOTE - COVID-19 RESULT DATE/TIME
16-Feb-2025 16:22 Staff asked to Please call patient her kappa light chains are elevated to see Hematology order given

## 2025-03-18 NOTE — ED PROVIDER NOTE - CLINICAL SUMMARY MEDICAL DECISION MAKING FREE TEXT BOX
HPI: 64-year-old male past medical history of ESRD on dialysis Tu/Thurs/Sat presents requsting dialysis.  Patient states last dialyzed 5 days ago.  Patient states that he went to dialysis today and was told the fistula was unusable and sent to the emergency department for evaluation.  Patient asymptomatic at this time.  Endorsing bruising all along the left side of his torso extending from the left upper extremity.  States that he was accessed 5 days ago resulting in severe edema at the fistula site with subsequent bruising.  Patient states that he was told by the dialysis team that it would go down at that time.  No other current complaints at this time.    ROS:   General: No fever, no chills, no malaise, no fatigue  ENT: No earache, no coryza, no sore throat  Neck: No stiffness, no swollen glands, no dysphagia  Respiratory: No cough, no dyspnea, no pleuritic chest pain  Cardiac: no chest pain, no palpitations, no edema, no jvd  Abdomen: No abdominal pain, no nausea, no vomiting, no diarrhea  : No dysuria, no increase frequency, no urgency, No discharge  Musculoskeletal: No myalgia, no arthralgia  Neurologic: No headache, no vertigo, no paresthesia, no focal deficits, no diplopia  Skin: See HPI  All other ROS are negative    PE:  General: A&O x3   Head: NC/AT  Eyes: PERRL, EOMI  ENT: Airway patent, mmm  Pulmonary: CTA b/l, symmetric breath sounds. No W/R/R.  Cardiac: s1s2, RRR, no M,G,R, No JVD  Back: Normal  spine  Extremities: FROM, symmetric pulses, capillary refill < 2 seconds, no edema, 5/5 strength in b/l UE and LE.  Left upper extremity fistula without palpable thrill or audible bruit  Skin: Ecchymosis in multiple purple and yellow/green stages extending from over the left upper extremity fistula and down along the left flank  Neurologic: alert, speech clear, no focal deficits, CN II-XII grossly intact, normal gait, sensation grossly intact  Psych: nl mood/affect, nl insight.    MDM: 64-year-old male past medical history of ESRD on dialysis Tu/Thurs/Sat presents requsting dialysis.  Patient states last dialyzed 5 days ago.  No palpable thrill or audible bruit on examination of the fistula.  Concern for complication from prior access resulting in internal hemorrhage and bruising.  Patient hemodynamically stable at this time.  Will likely need dialysis and potential revision/intervention of fistula site.  Will discuss case with nephrology and vascular surgery.  Will obtain CBC, CMP, coags, TNS, screening EKG.  Disposition pending results and recommendations. HPI: 64-year-old male past medical history of ESRD on dialysis Tu/Thurs/Sat presents requesting dialysis.  Patient states last dialyzed 5 days ago.  Patient states that he went to dialysis today and was told the fistula was unusable and sent to the emergency department for evaluation.  Patient asymptomatic at this time.  Endorsing bruising all along the left side of his torso extending from the left upper extremity.  States that he was accessed 5 days ago resulting in severe edema at the fistula site with subsequent bruising.  Patient states that he was told by the dialysis team that it would go down at that time.  No other current complaints at this time.    ROS:   General: No fever, no chills, no malaise, no fatigue  ENT: No earache, no coryza, no sore throat  Neck: No stiffness, no swollen glands, no dysphagia  Respiratory: No cough, no dyspnea, no pleuritic chest pain  Cardiac: no chest pain, no palpitations, no edema, no jvd  Abdomen: No abdominal pain, no nausea, no vomiting, no diarrhea  : No dysuria, no increase frequency, no urgency, No discharge  Musculoskeletal: No myalgia, no arthralgia  Neurologic: No headache, no vertigo, no paresthesia, no focal deficits, no diplopia  Skin: See HPI  All other ROS are negative    PE:  General: A&O x3   Head: NC/AT  Eyes: PERRL, EOMI  ENT: Airway patent, mmm  Pulmonary: CTA b/l, symmetric breath sounds. No W/R/R.  Cardiac: s1s2, RRR, no M,G,R, No JVD  Back: Normal  spine  Extremities: FROM, symmetric pulses, capillary refill < 2 seconds, no edema, 5/5 strength in b/l UE and LE.  Left upper extremity fistula without palpable thrill or audible bruit  Skin: Ecchymosis in multiple purple and yellow/green stages extending from over the left upper extremity fistula and down along the left flank  Neurologic: alert, speech clear, no focal deficits, CN II-XII grossly intact, normal gait, sensation grossly intact  Psych: nl mood/affect, nl insight.    MDM: 64-year-old male past medical history of ESRD on dialysis Tu/Thurs/Sat presents requesting dialysis.  Patient states last dialyzed 5 days ago.  No palpable thrill or audible bruit on examination of the fistula.  Concern for complication from prior access resulting in internal hemorrhage and bruising.  Patient hemodynamically stable at this time.  Will likely need dialysis and potential revision/intervention of fistula site.  Will discuss case with nephrology and vascular surgery.  Will obtain CBC, CMP, coags, TNS, screening EKG.  Disposition pending results and recommendations. HPI: 64-year-old male past medical history of ESRD on dialysis Tu/Thurs/Sat presents requesting dialysis.  Patient states last dialyzed 5 days ago.  Patient states that he went to dialysis today and was told the fistula was unusable and sent to the emergency department for evaluation.  Patient asymptomatic at this time.  Endorsing bruising all along the left side of his torso extending from the left upper extremity.  States that he was accessed 5 days ago resulting in severe edema at the fistula site with subsequent bruising.  Patient states that he was told by the dialysis team that it would go down at that time.  No other current complaints at this time.    ROS:   General: No fever, no chills, no malaise, no fatigue  ENT: No earache, no coryza, no sore throat  Neck: No stiffness, no swollen glands, no dysphagia  Respiratory: No cough, no dyspnea, no pleuritic chest pain  Cardiac: no chest pain, no palpitations, no edema, no jvd  Abdomen: No abdominal pain, no nausea, no vomiting, no diarrhea  : No dysuria, no increase frequency, no urgency, No discharge  Musculoskeletal: No myalgia, no arthralgia  Neurologic: No headache, no vertigo, no paresthesia, no focal deficits, no diplopia  Skin: See HPI  All other ROS are negative    PE:  General: A&O x3   Head: NC/AT  Eyes: PERRL, EOMI  ENT: Airway patent, mmm  Pulmonary: CTA b/l, symmetric breath sounds. No W/R/R.  Cardiac: s1s2, RRR, no M,G,R, No JVD  Back: Normal  spine  Extremities: FROM, symmetric pulses, capillary refill < 2 seconds, no edema, 5/5 strength in b/l UE and LE.  Left upper extremity fistula without palpable thrill or audible bruit  Skin: Ecchymosis in multiple purple and yellow/green stages extending from over the left upper extremity fistula and down along the left flank  Neurologic: alert, speech clear, no focal deficits, CN II-XII grossly intact, normal gait, sensation grossly intact  Psych: nl mood/affect, nl insight.    MDM: 64-year-old male past medical history of ESRD on dialysis Tu/Thurs/Sat presents requesting dialysis.  Patient states last dialyzed 5 days ago.  No palpable thrill or audible bruit on examination of the fistula.  Concern for complication from prior access resulting in internal hemorrhage and bruising.  Patient hemodynamically stable at this time.  Will likely need dialysis and potential revision/intervention of fistula site.  Will discuss case with nephrology and vascular surgery.  Will obtain CBC, CMP, coags, TNS, screening EKG.  Disposition pending results and recommendations.    Progress note 2030: Case discussed with vascular surgery.  Recommending duplex of the fistula and medicine consult.  Case discussed with Dr. Rodriguez for medicine consult. HPI: 64-year-old male past medical history of ESRD on dialysis Tu/Thurs/Sat presents requesting dialysis.  Patient states last dialyzed 5 days ago.  Patient states that he went to dialysis today and was told the fistula was unusable and sent to the emergency department for evaluation.  Patient asymptomatic at this time.  Endorsing bruising all along the left side of his torso extending from the left upper extremity.  States that he was accessed 5 days ago resulting in severe edema at the fistula site with subsequent bruising.  Patient states that he was told by the dialysis team that it would go down at that time.  No other current complaints at this time.    ROS:   General: No fever, no chills, no malaise, no fatigue  ENT: No earache, no coryza, no sore throat  Neck: No stiffness, no swollen glands, no dysphagia  Respiratory: No cough, no dyspnea, no pleuritic chest pain  Cardiac: no chest pain, no palpitations, no edema, no jvd  Abdomen: No abdominal pain, no nausea, no vomiting, no diarrhea  : No dysuria, no increase frequency, no urgency, No discharge  Musculoskeletal: No myalgia, no arthralgia  Neurologic: No headache, no vertigo, no paresthesia, no focal deficits, no diplopia  Skin: See HPI  All other ROS are negative    PE:  General: A&O x3   Head: NC/AT  Eyes: PERRL, EOMI  ENT: Airway patent, mmm  Pulmonary: CTA b/l, symmetric breath sounds. No W/R/R.  Cardiac: s1s2, RRR, no M,G,R, No JVD  Back: Normal  spine  Extremities: FROM, symmetric pulses, capillary refill < 2 seconds, no edema, 5/5 strength in b/l UE and LE.  Left upper extremity fistula without palpable thrill or audible bruit  Skin: Ecchymosis in multiple purple and yellow/green stages extending from over the left upper extremity fistula and down along the left flank  Neurologic: alert, speech clear, no focal deficits, CN II-XII grossly intact, normal gait, sensation grossly intact  Psych: nl mood/affect, nl insight.    MDM: 64-year-old male past medical history of ESRD on dialysis Tu/Thurs/Sat presents requesting dialysis.  Patient states last dialyzed 5 days ago.  No palpable thrill or audible bruit on examination of the fistula.  Concern for complication from prior access resulting in internal hemorrhage and bruising.  Patient hemodynamically stable at this time.  Will likely need dialysis and potential revision/intervention of fistula site.  Will discuss case with nephrology and vascular surgery.  Will obtain CBC, CMP, coags, TNS, screening EKG.  Disposition pending results and recommendations.    Progress note 2030: Case discussed with vascular surgery.  Recommending duplex of the fistula and medicine consult.  Case discussed with Dr. Rodriguez for medicine consult.    Progress note 2300: Recommendations discussed with vascular surgery, despite prior requested medicine consult now requesting medicine admission at this time for further management of renal failure prior to operative intervention.

## 2025-03-18 NOTE — ED PROVIDER NOTE - COVID-19  TEST TYPE
RN LVM for patient given that he still has not engaged in IOP. Patient will need to be back in treatment prior to his upcoming appointment with Dr. Noel. Patient provided with Central Scheduling number to re-engage.    MOLECULAR PCR

## 2025-03-18 NOTE — H&P ADULT - ATTENDING COMMENTS
63 y/o male with PMH of ESRD (on HD T/Th/Sat), Afib (not on AC 2/2 prior GIB now s/p watchman), CAD s/p CABG, former substance use disorder, Mitral valve infective endocarditis (2023), aortic and mitral valve replacement, Type A aortic dissection s/p repair c/b ischemic bowel s/p resection, seizure disorder was sent to the ED from HD access for malfunctioning AV graft. Last HD was 03/13/25. Vascular consulted, HD duppler ordered. Nephrology consulted for continuation of HD while in patient. Will benefit from CT with contrast  to evaluate for extravasation.    Rest of the plan as above

## 2025-03-18 NOTE — H&P ADULT - TIME BILLING
chart, labs and imaging reviewed. Physical examination, medication reconciliation and documentation. Discussion with patient, medical resident and ED/floor nurse.

## 2025-03-18 NOTE — H&P ADULT - NSHPPHYSICALEXAM_GEN_ALL_CORE
PHYSICAL EXAM:    Vital Signs Last 24 Hrs    T(C): 36.5 (18 Mar 2025 23:18), Max: 36.5 (18 Mar 2025 23:18)  T(F): 97.7 (18 Mar 2025 23:18), Max: 97.7 (18 Mar 2025 23:18)  HR: 51 (18 Mar 2025 23:18) (44 - 61)  BP: 141/66 (18 Mar 2025 23:18) (133/52 - 195/88)  BP(mean): 86 (18 Mar 2025 23:18) (86 - 86)  RR: 16 (18 Mar 2025 23:18) (16 - 18)  SpO2: 95% (18 Mar 2025 23:18) (95% - 98%)    Parameters below as of 18 Mar 2025 23:18  Patient On (Oxygen Delivery Method): room air PHYSICAL EXAM:  GENERAL: No acute distress, comfortably in bed  HEENT: Atraumatic, normocephalic, non-icteric, no JVD  NEURO: A&Ox3, no focal deficits, moving all extremities spontaneously, no dysarthria,   PSYCH: Normal affect, calm, appropriate insight and judgment, fluent speech  LUNGS: CTAB, no wrr, non-labored breathing  HEART: RRR, no murmur appreciated  ABD: Soft, non-tender, non-distended, no organomegaly, no appreciable masses, +bs   SKIN: warm, dry  EXTREMITIES: Nontender, no clubbing, cyanosis, or edema     Vital Signs Last 24 Hrs    T(C): 36.5 (18 Mar 2025 23:18), Max: 36.5 (18 Mar 2025 23:18)  T(F): 97.7 (18 Mar 2025 23:18), Max: 97.7 (18 Mar 2025 23:18)  HR: 51 (18 Mar 2025 23:18) (44 - 61)  BP: 141/66 (18 Mar 2025 23:18) (133/52 - 195/88)  BP(mean): 86 (18 Mar 2025 23:18) (86 - 86)  RR: 16 (18 Mar 2025 23:18) (16 - 18)  SpO2: 95% (18 Mar 2025 23:18) (95% - 98%)    Parameters below as of 18 Mar 2025 23:18  Patient On (Oxygen Delivery Method): room air PHYSICAL EXAM:  GENERAL: No acute distress, comfortably in bed  HEENT: Atraumatic, normocephalic, non-icteric, no JVD  NEURO: A&Ox3, no focal deficits, moving all extremities spontaneously, no dysarthria,   PSYCH: Normal affect, calm, appropriate insight and judgment, fluent speech  LUNGS: CTAB, no wrr, non-labored breathing  HEART: RRR, no murmur appreciated  ABD: Soft, non-tender, non-distended, no organomegaly, no appreciable masses, +bs   SKIN: warm, dry  EXTREMITIES: BL LE without edema or ttp. RUE: no edema or TTP. LUE: + erythem noted from puncture site on fistula, faint audible thrill, thrill not palpable, Ecchymosis and yellowing of skin extending from fistula over Left pec to the L flank. minimal TTP over fistula.     Vital Signs Last 24 Hrs    T(C): 36.5 (18 Mar 2025 23:18), Max: 36.5 (18 Mar 2025 23:18)  T(F): 97.7 (18 Mar 2025 23:18), Max: 97.7 (18 Mar 2025 23:18)  HR: 51 (18 Mar 2025 23:18) (44 - 61)  BP: 141/66 (18 Mar 2025 23:18) (133/52 - 195/88)  BP(mean): 86 (18 Mar 2025 23:18) (86 - 86)  RR: 16 (18 Mar 2025 23:18) (16 - 18)  SpO2: 95% (18 Mar 2025 23:18) (95% - 98%)    Parameters below as of 18 Mar 2025 23:18  Patient On (Oxygen Delivery Method): room air PHYSICAL EXAM:    GENERAL: No acute distress, comfortably in bed  HEENT: Atraumatic, normocephalic, non-icteric, no JVD  NEURO: A&Ox3, no focal deficits, moving all extremities spontaneously, no dysarthria,   PSYCH: Normal affect, calm, appropriate insight and judgment, fluent speech  LUNGS: CTAB, no wrr, non-labored breathing  HEART: RRR, no murmur appreciated  ABD: Soft, non-tender, non-distended, no organomegaly, no appreciable masses, +bs   SKIN: warm, dry  EXTREMITIES: BL LE without edema or ttp. RUE: no edema or TTP. LUE: + erythem noted from puncture site on fistula, faint audible thrill, thrill not palpable, Ecchymosis and yellowing of skin extending from fistula over Left pec to the L flank. minimal TTP over fistula.     Vital Signs Last 24 Hrs    T(C): 36.5 (18 Mar 2025 23:18), Max: 36.5 (18 Mar 2025 23:18)  T(F): 97.7 (18 Mar 2025 23:18), Max: 97.7 (18 Mar 2025 23:18)  HR: 51 (18 Mar 2025 23:18) (44 - 61)  BP: 141/66 (18 Mar 2025 23:18) (133/52 - 195/88)  BP(mean): 86 (18 Mar 2025 23:18) (86 - 86)  RR: 16 (18 Mar 2025 23:18) (16 - 18)  SpO2: 95% (18 Mar 2025 23:18) (95% - 98%)    Parameters below as of 18 Mar 2025 23:18  Patient On (Oxygen Delivery Method): room air PHYSICAL EXAM:    GENERAL: No acute distress, comfortably in bed  HEENT: Atraumatic, normocephalic, non-icteric, no JVD  NEURO: A&Ox3, no focal deficits, moving all extremities spontaneously, no dysarthria,   PSYCH: Normal affect, calm, appropriate insight and judgment, fluent speech  LUNGS: CTAB, no wrr, non-labored breathing  HEART: RRR, no murmur appreciated  ABD: Soft, non-tender, non-distended, no organomegaly, no appreciable masses, +bs   SKIN: warm, dry  EXTREMITIES: BL LE without edema or ttp. RUE: no edema or TTP. LUE: + erythema noted from puncture site on fistula, faint audible thrill, thrill not palpable, Ecchymosis and yellowing of skin extending from fistula over Left pec to the L flank. minimal TTP over fistula.     Vital Signs Last 24 Hrs    T(C): 36.5 (18 Mar 2025 23:18), Max: 36.5 (18 Mar 2025 23:18)  T(F): 97.7 (18 Mar 2025 23:18), Max: 97.7 (18 Mar 2025 23:18)  HR: 51 (18 Mar 2025 23:18) (44 - 61)  BP: 141/66 (18 Mar 2025 23:18) (133/52 - 195/88)  BP(mean): 86 (18 Mar 2025 23:18) (86 - 86)  RR: 16 (18 Mar 2025 23:18) (16 - 18)  SpO2: 95% (18 Mar 2025 23:18) (95% - 98%)    Parameters below as of 18 Mar 2025 23:18  Patient On (Oxygen Delivery Method): room air

## 2025-03-19 LAB
ANION GAP SERPL CALC-SCNC: 16 MMOL/L — SIGNIFICANT CHANGE UP (ref 5–17)
BUN SERPL-MCNC: 93.8 MG/DL — HIGH (ref 8–20)
CALCIUM SERPL-MCNC: 8.8 MG/DL — SIGNIFICANT CHANGE UP (ref 8.4–10.5)
CHLORIDE SERPL-SCNC: 101 MMOL/L — SIGNIFICANT CHANGE UP (ref 96–108)
CO2 SERPL-SCNC: 22 MMOL/L — SIGNIFICANT CHANGE UP (ref 22–29)
CREAT SERPL-MCNC: 8.76 MG/DL — HIGH (ref 0.5–1.3)
EGFR: 6 ML/MIN/1.73M2 — LOW
EGFR: 6 ML/MIN/1.73M2 — LOW
GLUCOSE SERPL-MCNC: 82 MG/DL — SIGNIFICANT CHANGE UP (ref 70–99)
HCT VFR BLD CALC: 31.5 % — LOW (ref 39–50)
HGB BLD-MCNC: 9.7 G/DL — LOW (ref 13–17)
IMMATURE PLATELET FRACTION #: 2.5 K/UL — LOW (ref 3.9–12.5)
IMMATURE PLATELET FRACTION %: 2.7 % — SIGNIFICANT CHANGE UP (ref 1.6–7.1)
MCHC RBC-ENTMCNC: 30.8 G/DL — LOW (ref 32–36)
MCHC RBC-ENTMCNC: 31 PG — SIGNIFICANT CHANGE UP (ref 27–34)
MCV RBC AUTO: 100.6 FL — HIGH (ref 80–100)
NRBC # BLD AUTO: 0 K/UL — SIGNIFICANT CHANGE UP (ref 0–0)
NRBC # FLD: 0 K/UL — SIGNIFICANT CHANGE UP (ref 0–0)
NRBC BLD AUTO-RTO: 0 /100 WBCS — SIGNIFICANT CHANGE UP (ref 0–0)
PHOSPHATE SERPL-MCNC: 7.7 MG/DL — HIGH (ref 2.4–4.7)
PLATELET # BLD AUTO: 94 K/UL — LOW (ref 150–400)
PMV BLD: 11.3 FL — SIGNIFICANT CHANGE UP (ref 7–13)
POTASSIUM SERPL-MCNC: 5 MMOL/L — SIGNIFICANT CHANGE UP (ref 3.5–5.3)
POTASSIUM SERPL-SCNC: 5 MMOL/L — SIGNIFICANT CHANGE UP (ref 3.5–5.3)
RBC # FLD: 14.6 % — HIGH (ref 10.3–14.5)
SODIUM SERPL-SCNC: 139 MMOL/L — SIGNIFICANT CHANGE UP (ref 135–145)
WBC # BLD: 4.65 K/UL — SIGNIFICANT CHANGE UP (ref 3.8–10.5)

## 2025-03-19 PROCEDURE — 99221 1ST HOSP IP/OBS SF/LOW 40: CPT | Mod: FS

## 2025-03-19 PROCEDURE — 99232 SBSQ HOSP IP/OBS MODERATE 35: CPT

## 2025-03-19 PROCEDURE — 93010 ELECTROCARDIOGRAM REPORT: CPT

## 2025-03-19 RX ORDER — SEVELAMER HYDROCHLORIDE 800 MG/1
1600 TABLET ORAL
Refills: 0 | Status: DISCONTINUED | OUTPATIENT
Start: 2025-03-19 | End: 2025-03-21

## 2025-03-19 RX ORDER — CARVEDILOL 3.12 MG/1
3.12 TABLET, FILM COATED ORAL EVERY 12 HOURS
Refills: 0 | Status: DISCONTINUED | OUTPATIENT
Start: 2025-03-19 | End: 2025-03-21

## 2025-03-19 RX ORDER — CARVEDILOL 3.12 MG/1
6.25 TABLET, FILM COATED ORAL EVERY 12 HOURS
Refills: 0 | Status: DISCONTINUED | OUTPATIENT
Start: 2025-03-19 | End: 2025-03-19

## 2025-03-19 RX ORDER — HYDROMORPHONE/SOD CHLOR,ISO/PF 2 MG/10 ML
0.5 SYRINGE (ML) INJECTION ONCE
Refills: 0 | Status: DISCONTINUED | OUTPATIENT
Start: 2025-03-19 | End: 2025-03-19

## 2025-03-19 RX ORDER — AMLODIPINE BESYLATE 10 MG/1
10 TABLET ORAL DAILY
Refills: 0 | Status: DISCONTINUED | OUTPATIENT
Start: 2025-03-19 | End: 2025-03-20

## 2025-03-19 RX ORDER — SODIUM ZIRCONIUM CYCLOSILICATE 5 G/5G
5 POWDER, FOR SUSPENSION ORAL ONCE
Refills: 0 | Status: COMPLETED | OUTPATIENT
Start: 2025-03-19 | End: 2025-03-19

## 2025-03-19 RX ORDER — TAMSULOSIN HYDROCHLORIDE 0.4 MG/1
0.4 CAPSULE ORAL AT BEDTIME
Refills: 0 | Status: DISCONTINUED | OUTPATIENT
Start: 2025-03-19 | End: 2025-03-21

## 2025-03-19 RX ORDER — ATORVASTATIN CALCIUM 80 MG/1
40 TABLET, FILM COATED ORAL AT BEDTIME
Refills: 0 | Status: DISCONTINUED | OUTPATIENT
Start: 2025-03-19 | End: 2025-03-21

## 2025-03-19 RX ORDER — ISOSORBIDE MONONITRATE 60 MG/1
60 TABLET, EXTENDED RELEASE ORAL DAILY
Refills: 0 | Status: DISCONTINUED | OUTPATIENT
Start: 2025-03-19 | End: 2025-03-20

## 2025-03-19 RX ORDER — EPOETIN ALFA 10000 [IU]/ML
10000 SOLUTION INTRAVENOUS; SUBCUTANEOUS ONCE
Refills: 0 | Status: COMPLETED | OUTPATIENT
Start: 2025-03-19 | End: 2025-03-19

## 2025-03-19 RX ORDER — TORSEMIDE 10 MG
20 TABLET ORAL DAILY
Refills: 0 | Status: DISCONTINUED | OUTPATIENT
Start: 2025-03-19 | End: 2025-03-21

## 2025-03-19 RX ORDER — LEVETIRACETAM 10 MG/ML
500 INJECTION, SOLUTION INTRAVENOUS
Refills: 0 | Status: DISCONTINUED | OUTPATIENT
Start: 2025-03-19 | End: 2025-03-21

## 2025-03-19 RX ADMIN — Medication 0.5 MILLIGRAM(S): at 03:02

## 2025-03-19 RX ADMIN — ATORVASTATIN CALCIUM 40 MILLIGRAM(S): 80 TABLET, FILM COATED ORAL at 21:07

## 2025-03-19 RX ADMIN — EPOETIN ALFA 10000 UNIT(S): 10000 SOLUTION INTRAVENOUS; SUBCUTANEOUS at 12:34

## 2025-03-19 RX ADMIN — AMLODIPINE BESYLATE 10 MILLIGRAM(S): 10 TABLET ORAL at 05:06

## 2025-03-19 RX ADMIN — TAMSULOSIN HYDROCHLORIDE 0.4 MILLIGRAM(S): 0.4 CAPSULE ORAL at 21:06

## 2025-03-19 RX ADMIN — Medication 0.5 MILLIGRAM(S): at 04:02

## 2025-03-19 RX ADMIN — Medication 100 MILLIGRAM(S): at 05:05

## 2025-03-19 RX ADMIN — Medication 4 MILLIGRAM(S): at 09:00

## 2025-03-19 RX ADMIN — CARVEDILOL 3.12 MILLIGRAM(S): 3.12 TABLET, FILM COATED ORAL at 18:16

## 2025-03-19 RX ADMIN — LEVETIRACETAM 500 MILLIGRAM(S): 10 INJECTION, SOLUTION INTRAVENOUS at 05:06

## 2025-03-19 RX ADMIN — Medication 0.3 MILLIGRAM(S): at 05:06

## 2025-03-19 RX ADMIN — Medication 0.3 MILLIGRAM(S): at 18:16

## 2025-03-19 RX ADMIN — Medication 100 MILLIGRAM(S): at 18:16

## 2025-03-19 RX ADMIN — Medication 40 MILLIGRAM(S): at 05:06

## 2025-03-19 RX ADMIN — SODIUM ZIRCONIUM CYCLOSILICATE 5 GRAM(S): 5 POWDER, FOR SUSPENSION ORAL at 03:04

## 2025-03-19 RX ADMIN — LEVETIRACETAM 500 MILLIGRAM(S): 10 INJECTION, SOLUTION INTRAVENOUS at 18:16

## 2025-03-19 NOTE — PATIENT PROFILE ADULT - FALL HARM RISK - HARM RISK INTERVENTIONS

## 2025-03-19 NOTE — PATIENT PROFILE ADULT - NSPROGENSOURCEINFO_GEN_A_NUR
patient Qbrexza Pregnancy And Lactation Text: There is no available data on Qbrexza use in pregnant women.  There is no available data on Qbrexza use in lactation.

## 2025-03-19 NOTE — PROGRESS NOTE ADULT - ASSESSMENT
Mr. Cummins is 65 y/o M w/ PMHx of ESRD (on HD tu/th/sa), Afib (not on AC 2/2 prior GIB now s/p watchman), CAD s/p CABG, former substance use disorder, Mitral valve infective endocarditis (2023), aortic and mitral valve replacement, Type A aortic dissection s/p repair c/b ischemic bowel s/p resection, seizure disorder, and recent hospital admission  for PNA (01/2025) admitted to [facility] for missed HD due to suspected fistula thrombosis.    #Missed HD  #ESRD  #Malfunctioning AV graft   - maintain tele/  - LUE fistula access w/ faintly palpable thrill, TTP, and extensive ecchymosis  - us noted with stenosis at the proximal anastomosis  - Pending IR for permacath  - vascular following, randy recs  - HD per renal  - continue sevelamer 1600 TID    #hyperkalemia  #hyperphosphatemia  - Trend BMP  - 1x Kayexalate   - Trend ECG as derangements worsen pending HD    #seizure disorder  - continue home Keppra 500 BID  -Seizure precautions    #Afib  - no AC d/t hx of GI bleed, s/p watchman  - pt with bradycardia down to 30s per RN  - Decrease Carvedilol to 3.125BID    #anemia  - chronic likely 2/2 ESRD  - MCV slightly macrocytic  - f/u B12 and folate    #HTN  Continue home medications:  - amlodipine 10 QD  - Hydralazine 100 BID  - carvedilol 6.25 BID  - torsemide 20 QD  - clonidine .3 BID    #HLD  #CAD  - cw home atorvastatin 40 QD    DVT ppx: SCDs, start chem dvt ppx post surgery, patient ambulatory, encourage ambulation  Dispo: pending vascular and renal clearance

## 2025-03-19 NOTE — PROGRESS NOTE ADULT - ASSESSMENT
ESRD - Last had HD Thurs 3/13 .  Sustained infiltration to LUE arm access causing sig hematoma with tracking down left chest wall  + swelling   Missed HD Sat   HD today --> team here was able to use AVG , but it is going to be difficult in out-patient setting   Pt is going to need to rest arm for a period of time , would recommend proceeding with permacath placement tomorrow     Anemia - Epogen with HD     RO - Continue binders

## 2025-03-19 NOTE — CONSULT NOTE ADULT - ASSESSMENT
64y Male known to the vascular surgery service who presents to the ED with history of malfunctioned left AV graft. Patient has a history of left axillary brachial loop graft creation in august 2024, LUE PSA repair in June 2024, Ex lap 2/2 SBO and mesenteric ischemia in October 2021. He normally has HD on TTS, however, on Thursday prior to presentation, patient had successful dialysis but developed large bruising to his arm and chest wall without pain. On Saturday, his arm was swollen and he was unable to have dialysis Followed by day of presentation he was again unable to complete dialysis due to suspected thrombosis of AV graft. Labs significant for worsening from baseline kidney function, VSS and patient afebrile. No other issues or concerns at this time.     Recommendations:  - Please obtain medicine and nephrology consult  - HD access duplex study  - Vascular surgery will continue to follow  - Further recommendations to follow    Patient discussed with Dr. Nieto
ESRD - Last had HD Thurs 3/13 .  Sustained infiltration to LUE arm access causing sig hematoma with tracking down left chest wall  + swelling   Missed HD Sat   Was told to come to ER today   Still has good UO and does not gain much weight   No SOB or CP   Vascular Sx to see  Doppler ordered     No pressing need for acute HD tonite   Will need temporary permacath 3/19 ---> will discuss with Vascular   Keep NPO p MN     Anemia - Epogen with HD     Hyperkalemia - Kayexalate x 1 
Patient is a 64 year-old male admitted for malfunctioning AV graft. IR consulted for TDC placement. Will plan for placement in AM. Keep patient NPO after MN. hold any AC.     Please call extension [phone] with any questions, concerns or issues regarding above.

## 2025-03-19 NOTE — PROGRESS NOTE ADULT - SUBJECTIVE AND OBJECTIVE BOX
NEPHROLOGY INTERVAL HPI/OVERNIGHT EVENTS:    Seen earlier   IR follow up noted   HD team here was able to cannulate outer aspect of AVG     MEDICATIONS  (STANDING):  amLODIPine   Tablet 10 milliGRAM(s) Oral daily  atorvastatin 40 milliGRAM(s) Oral at bedtime  carvedilol 3.125 milliGRAM(s) Oral every 12 hours  chlorhexidine 2% Cloths 1 Application(s) Topical <User Schedule>  cloNIDine 0.3 milliGRAM(s) Oral two times a day  hydrALAZINE 100 milliGRAM(s) Oral two times a day  isosorbide   mononitrate ER Tablet (IMDUR) 60 milliGRAM(s) Oral daily  levETIRAcetam 500 milliGRAM(s) Oral two times a day  pantoprazole    Tablet 40 milliGRAM(s) Oral before breakfast  sevelamer carbonate 1600 milliGRAM(s) Oral three times a day with meals  tamsulosin 0.4 milliGRAM(s) Oral at bedtime  torsemide 20 milliGRAM(s) Oral daily    MEDICATIONS  (PRN):  acetaminophen     Tablet .. 650 milliGRAM(s) Oral every 6 hours PRN Temp greater or equal to 38C (100.4F), Mild Pain (1 - 3)  aluminum hydroxide/magnesium hydroxide/simethicone Suspension 30 milliLiter(s) Oral every 4 hours PRN Dyspepsia  melatonin 3 milliGRAM(s) Oral at bedtime PRN Insomnia  ondansetron Injectable 4 milliGRAM(s) IV Push every 8 hours PRN Nausea and/or Vomiting      Allergies    penicillin (Other)    Intolerances              Vital Signs Last 24 Hrs  T(C): 36.7 (19 Mar 2025 11:50), Max: 36.7 (19 Mar 2025 11:50)  T(F): 98 (19 Mar 2025 11:50), Max: 98 (19 Mar 2025 11:50)  HR: 60 (19 Mar 2025 11:50) (44 - 61)  BP: 150/60 (19 Mar 2025 11:50) (133/52 - 195/88)  BP(mean): 86 (18 Mar 2025 23:18) (86 - 86)  RR: 18 (19 Mar 2025 11:50) (16 - 18)  SpO2: 92% (19 Mar 2025 11:50) (91% - 98%)    Parameters below as of 19 Mar 2025 11:50  Patient On (Oxygen Delivery Method): room air      Daily Height in cm: 167.64 (18 Mar 2025 16:00)    Daily Weight in k (19 Mar 2025 11:50)  I&O's Detail    I&O's Summary      PHYSICAL EXAM:    GENERAL: NAD  HEAD:  Atraumatic, Normocephalic  NECK: Supple, No JVD,   NERVOUS SYSTEM:  Alert & Oriented X3,   CHEST/LUNG: EAE; No rales, rhonchi, wheezing, or rubs  HEART: Regular rate and rhythm; No murmurs, rubs, or gallops  ABDOMEN: Soft, Nontender, Nondistended; Bowel sounds present  EXTREMITIES: La rm access with swelling / hematoma . No drainage . (+) bruit     LABS:                        9.7    4.65  )-----------( 94       ( 19 Mar 2025 04:50 )             31.5     -    139  |  101  |  93.8[H]  ----------------------------<  82  5.0   |  22.0  |  8.76[H]    Ca    8.8      19 Mar 2025 04:50  Phos  7.7     -  Mg     2.2         TPro  7.1  /  Alb  4.2  /  TBili  1.2  /  DBili  x   /  AST  16  /  ALT  9   /  AlkPhos  101  03-18    PT/INR - ( 18 Mar 2025 18:30 )   PT: 14.4 sec;   INR: 1.28 ratio         PTT - ( 18 Mar 2025 18:30 )  PTT:38.3 sec  Urinalysis Basic - ( 19 Mar 2025 04:50 )    Color: x / Appearance: x / SG: x / pH: x  Gluc: 82 mg/dL / Ketone: x  / Bili: x / Urobili: x   Blood: x / Protein: x / Nitrite: x   Leuk Esterase: x / RBC: x / WBC x   Sq Epi: x / Non Sq Epi: x / Bacteria: x      Phosphorus: 7.7 mg/dL ( @ 04:50)  Magnesium: 2.2 mg/dL ( @ 18:30)  Phosphorus: 7.4 mg/dL ( @ 18:30)    < from: US Duplex Hemodialysis Access (25 @ 23:19) >          INTERPRETATION:  [facility] RADIOLOGIST PRELIMINARY REPORT    PROCEDURE INFORMATION:  Exam: US Duplex Hemodialysis Access  Exam date and time: 3/18/2025 10:26 PM  Age: 64 years old  Clinical indication: Fistula evaluation    TECHNIQUE:  Imaging protocol: US Duplex hemodialysis access with gray scale, color   Doppler,  and spectral waveform analysis. Vascular access, inflow and outflow were  evaluated. Duplex exam was performed to evaluate for vascular conditions.    COMPARISON:  No relevant prior studies available.    FINDINGS:  Vascular access: Vascular access is patent.  Elevated velocities at the  proximal anastomosis and proximal graft.  No pseudoaneurysm.  Arterial inflow: Patent. Normal waveforms.  Venous outflow: Patent. Normal waveforms.  Soft tissues: 3 x 1.2 x 2 cm hematoma adjacent to the mid graft.    IMPRESSION:  Evidence of stenosis at the proximal anastomosis and proximal graft.    3 x 1.2 x 2 cm hematoma adjacent to the mid graft.        ******PRELIMINARY REPORT******      ******PRELIMINARY REPORT******         DONNA LARA M.D.;[facility] RADIOLOGIST  This document is a PRELIMINARY interpretation and is pending final   attending approval. Mar 19 2025  4:2    < end of copied text >        RADIOLOGY & ADDITIONAL TESTS:

## 2025-03-19 NOTE — PROGRESS NOTE ADULT - ASSESSMENT
64y Male known to the vascular surgery service who presents to the ED with history of malfunctioned left AV graft. Patient has a history of left axillary brachial loop graft creation in august 2024, LUE PSA repair in June 2024, Ex lap 2/2 SBO and mesenteric ischemia in October 2021. He normally has HD on TTS, however, on Thursday prior to presentation, patient had successful dialysis but developed large bruising to his arm and chest wall without pain. On Saturday, his arm was swollen and he was unable to have dialysis Followed by day of presentation he was again unable to complete dialysis due to suspected thrombosis of AV graft. Labs significant for worsening from baseline kidney function, VSS and patient afebrile. No other issues or concerns at this time.     Recommendations:  - Medicine and nephrology eval appreciated   - HD access duplex study performed and reviewed , may access the GRAFT for dialysis  - rest of care by primary team  64y Male known to the vascular surgery service who presents to the ED with history of malfunctioned left AV graft. Patient has a history of left axillary brachial loop graft creation in august 2024, LUE PSA repair in June 2024, Ex lap 2/2 SBO and mesenteric ischemia in October 2021. He normally has HD on TTS, however, on Thursday prior to presentation, patient had successful dialysis but developed large bruising to his arm and chest wall without pain. On Saturday, his arm was swollen and he was unable to have dialysis Followed by day of presentation he was again unable to complete dialysis due to suspected thrombosis of AV graft. Labs significant for worsening from baseline kidney function, VSS and patient afebrile. No other issues or concerns at this time.     Recommendations:  - Medicine and nephrology eval appreciated   - HD access duplex study performed and reviewed   - recommend PC placement by IR   - rest of care by primary team

## 2025-03-19 NOTE — PROGRESS NOTE ADULT - SUBJECTIVE AND OBJECTIVE BOX
HPI/OVERNIGHT EVENTS: Patient seen and examined at bedside this AM. No overnight events. No complaints. Denies fever    Vital Signs Last 24 Hrs  T(C): 36.4 (19 Mar 2025 04:02), Max: 36.5 (18 Mar 2025 23:18)  T(F): 97.6 (19 Mar 2025 04:02), Max: 97.7 (18 Mar 2025 23:18)  HR: 57 (19 Mar 2025 04:02) (44 - 61)  BP: 151/83 (19 Mar 2025 04:02) (133/52 - 195/88)  BP(mean): 86 (18 Mar 2025 23:18) (86 - 86)  RR: 18 (19 Mar 2025 04:02) (16 - 18)  SpO2: 91% (19 Mar 2025 04:02) (91% - 98%)    Parameters below as of 19 Mar 2025 04:02  Patient On (Oxygen Delivery Method): room air        I&O's Detail      GENERAL: No acute distress, comfortably in bed  HEENT: Atraumatic, normocephalic, non-icteric, no JVD  NEURO: A&Ox3, no focal deficits, moving all extremities spontaneously, no dysarthria,   PSYCH: Normal affect, calm, appropriate insight and judgment, fluent speech  LUNGS: CTAB, no wrr, non-labored breathing  HEART: RRR, no murmur appreciated  ABD: Soft, non-tender, non-distended, no organomegaly, no appreciable masses, +bs   SKIN: warm, dry  EXTREMITIES: BL LE without edema or ttp. RUE: no edema or TTP. LUE: + erythema noted from puncture site on fistula, faint audible thrill, thrill not palpable, Ecchymosis and yellowing of skin extending from fistula over Left pec to the L flank. minimal TTP over fistula.     LABS:                        9.7    4.65  )-----------( 94       ( 19 Mar 2025 04:50 )             31.5     03-19    139  |  101  |  93.8[H]  ----------------------------<  82  5.0   |  22.0  |  8.76[H]    Ca    8.8      19 Mar 2025 04:50  Phos  7.7     03-19  Mg     2.2     03-18    TPro  7.1  /  Alb  4.2  /  TBili  1.2  /  DBili  x   /  AST  16  /  ALT  9   /  AlkPhos  101  03-18    PT/INR - ( 18 Mar 2025 18:30 )   PT: 14.4 sec;   INR: 1.28 ratio         PTT - ( 18 Mar 2025 18:30 )  PTT:38.3 sec  Urinalysis Basic - ( 19 Mar 2025 04:50 )    Color: x / Appearance: x / SG: x / pH: x  Gluc: 82 mg/dL / Ketone: x  / Bili: x / Urobili: x   Blood: x / Protein: x / Nitrite: x   Leuk Esterase: x / RBC: x / WBC x   Sq Epi: x / Non Sq Epi: x / Bacteria: x        MEDICATIONS  (STANDING):  amLODIPine   Tablet 10 milliGRAM(s) Oral daily  atorvastatin 40 milliGRAM(s) Oral at bedtime  carvedilol 6.25 milliGRAM(s) Oral every 12 hours  cloNIDine 0.3 milliGRAM(s) Oral two times a day  hydrALAZINE 100 milliGRAM(s) Oral two times a day  isosorbide   mononitrate ER Tablet (IMDUR) 60 milliGRAM(s) Oral daily  levETIRAcetam 500 milliGRAM(s) Oral two times a day  pantoprazole    Tablet 40 milliGRAM(s) Oral before breakfast  sevelamer carbonate 1600 milliGRAM(s) Oral three times a day with meals  tamsulosin 0.4 milliGRAM(s) Oral at bedtime  torsemide 20 milliGRAM(s) Oral daily    MEDICATIONS  (PRN):  acetaminophen     Tablet .. 650 milliGRAM(s) Oral every 6 hours PRN Temp greater or equal to 38C (100.4F), Mild Pain (1 - 3)  aluminum hydroxide/magnesium hydroxide/simethicone Suspension 30 milliLiter(s) Oral every 4 hours PRN Dyspepsia  melatonin 3 milliGRAM(s) Oral at bedtime PRN Insomnia  ondansetron Injectable 4 milliGRAM(s) IV Push every 8 hours PRN Nausea and/or Vomiting      MICRO:   Cultures     STUDIES:   EKG, CXR, U/S, CT, MRI

## 2025-03-19 NOTE — CONSULT NOTE ADULT - SUBJECTIVE AND OBJECTIVE BOX
HPI:    Mr. Cummins is 63 y/o M w/ PMHx of ESRD (on HD tu/th/sa), Afib (not on AC 2/2 prior GIB now s/p watchman), CAD s/p CABG, former substance use disorder, Mitral valve infective endocarditis (2023), aortic and mitral valve replacement, Type A aortic dissection s/p repair c/b ischemic bowel s/p resection, seizure disorder, and recent hospital admission  for PNA (01/2025) who is presenting the [facility] ED after he was informed his left upper extremity fistula was not accessible when presenting for HD today. Patient reports that after HD on 03/11 he developed pain and swelling from his LUE fistula which he was counseled would resolved on its own. On 03/13 , he was able to complete dialysis, but reports that access of the fistula was painful and the swelling worsened. When he presented today (03/18), patient was counseled to go to ED.  He mentions that he has swelling from his L arm extending down to his R flank, which has continued to progressively worsen. He complain of L upper extremity pain, which is intermittently throbbing in nature and is tender to palpation. Denies HA, DZ, Vision Changes, Sore throat, cough, CP, SOB, Abdominal pain, diarrhea, constipation or dysuria. Patient reports he makes urine regularly and without difficulty. Patient recently restarted HD, reports that in 2003 when he had endocarditis he was resumed on HD, he had renal recovery for 3 years prior to that admission. Reports total 3-4 years on HD.   (18 Mar 2025 23:34)    IR consulted for Tunnelled Dialysis Catheter placement    ============================================================================  Medications:  Home Medications:  acetaminophen 325 mg oral tablet: 2 tab(s) orally every 6 hours As needed Temp greater or equal to 38C (100.4F), Mild Pain (1 - 3) (19 Mar 2025 00:28)  amLODIPine 10 mg oral tablet: 1 tab(s) orally once a day (19 Mar 2025 00:28)  atorvastatin 40 mg oral tablet: 1 tab(s) orally once a day (at bedtime) (19 Mar 2025 00:28)  carvedilol 6.25 mg oral tablet: 1 tab(s) orally 2 times a day (19 Mar 2025 00:28)  cloNIDine 0.3 mg oral tablet: 1 tab(s) orally 2 times a day (19 Mar 2025 00:28)  hydrALAZINE 100 mg oral tablet: 1 tab(s) orally 2 times a day (19 Mar 2025 00:28)  isosorbide mononitrate 60 mg oral tablet, extended release: 1 tab(s) orally once a day (19 Mar 2025 00:28)  levETIRAcetam 500 mg oral tablet: 1 tab(s) orally 2 times a day (19 Mar 2025 00:28)  Lomotil 2.5 mg-0.025 mg oral tablet: 1 tab(s) orally once a day (19 Mar 2025 00:28)  tamsulosin 0.4 mg oral capsule: 1 cap(s) orally once a day (at bedtime) (19 Mar 2025 00:28)  torsemide 20 mg oral tablet: 1 tab(s) orally once a day (19 Mar 2025 00:28)    MEDICATIONS  (STANDING):  amLODIPine   Tablet 10 milliGRAM(s) Oral daily  atorvastatin 40 milliGRAM(s) Oral at bedtime  carvedilol 3.125 milliGRAM(s) Oral every 12 hours  cloNIDine 0.3 milliGRAM(s) Oral two times a day  hydrALAZINE 100 milliGRAM(s) Oral two times a day  isosorbide   mononitrate ER Tablet (IMDUR) 60 milliGRAM(s) Oral daily  levETIRAcetam 500 milliGRAM(s) Oral two times a day  pantoprazole    Tablet 40 milliGRAM(s) Oral before breakfast  sevelamer carbonate 1600 milliGRAM(s) Oral three times a day with meals  tamsulosin 0.4 milliGRAM(s) Oral at bedtime  torsemide 20 milliGRAM(s) Oral daily    MEDICATIONS  (PRN):  acetaminophen     Tablet .. 650 milliGRAM(s) Oral every 6 hours PRN Temp greater or equal to 38C (100.4F), Mild Pain (1 - 3)  aluminum hydroxide/magnesium hydroxide/simethicone Suspension 30 milliLiter(s) Oral every 4 hours PRN Dyspepsia  melatonin 3 milliGRAM(s) Oral at bedtime PRN Insomnia  ondansetron Injectable 4 milliGRAM(s) IV Push every 8 hours PRN Nausea and/or Vomiting      Allergies:   penicillin (Other)    ============================================================================  PAST MEDICAL & SURGICAL HISTORY:  CHF (congestive heart failure)      CVA (cerebral vascular accident)      Seizures  last seizure 10 years ago      HTN (hypertension)      Hyperlipemia      COVID-19  october 2020      Atrial fibrillation      Former smoker      History of cocaine use  remote hx, currently sober      H/O aortic dissection  s/p repair (2010), surgery complicated by bowel ischemia s/p bowel resection and ostomy with reversal      H/O aortic valve insufficiency      Mitral regurgitation      GIB (gastrointestinal bleeding)      CAD (coronary artery disease)  s/p PCI 1998  and CABG x 2 11/2020      Chronic atrial fibrillation      Aneurysm of artery of upper extremity      Anemia of chronic disease      End stage renal disease      Myocardial infarction      COVID-19 vaccine series completed      Port-A-Cath in place      H/O colectomy      Status post double vessel coronary artery bypass  11/2020 Dr Butler      S/P AVR (aortic valve replacement)  (t)AVR 11/2020 Dr Butler      S/P MVR (mitral valve replacement)  (t)MVR 11/2020 Dr Butler      H/O aortic dissection  Type A; emergent repair 2010      AV fistula  LUE      History of renal transplant      Presence of Watchman left atrial appendage closure device          FAMILY HISTORY:  FH: hypertension    Family history of cardiac disorder (Mother)  mother        Social History:  Distant hx of IVDU, denies current drug, alcohol or tobacco use, currently unemployed (18 Mar 2025 23:34)      ============================================================================  Vitals:  Vital Signs Last 24 Hrs  T(C): 36.7 (19 Mar 2025 11:50), Max: 36.7 (19 Mar 2025 11:50)  T(F): 98 (19 Mar 2025 11:50), Max: 98 (19 Mar 2025 11:50)  HR: 60 (19 Mar 2025 11:50) (44 - 61)  BP: 150/60 (19 Mar 2025 11:50) (133/52 - 195/88)  BP(mean): 86 (18 Mar 2025 23:18) (86 - 86)  RR: 18 (19 Mar 2025 11:50) (16 - 18)  SpO2: 92% (19 Mar 2025 11:50) (91% - 98%)    Parameters below as of 19 Mar 2025 11:50  Patient On (Oxygen Delivery Method): room air    Labs:                        9.7    4.65  )-----------( 94       ( 19 Mar 2025 04:50 )             31.5     03-19    139  |  101  |  93.8[H]  ----------------------------<  82  5.0   |  22.0  |  8.76[H]    Ca    8.8      19 Mar 2025 04:50  Phos  7.7     03-19  Mg     2.2     03-18    TPro  7.1  /  Alb  4.2  /  TBili  1.2  /  DBili  x   /  AST  16  /  ALT  9   /  AlkPhos  101  03-18    PT/INR - ( 18 Mar 2025 18:30 )   PT: 14.4 sec;   INR: 1.28 ratio         PTT - ( 18 Mar 2025 18:30 )  PTT:38.3 sec    Imaging:   Pertinent Imaging Reviewed.    ============================================================================
Mr. Cummins is 65 y/o M w/ PMHx of ESRD (on HD tu/th/sa), Afib (not on AC 2/2 prior GIB now s/p watchman), CAD s/p CABG, former substance use disorder, Mitral valve infective endocarditis (2023), aortic and mitral valve replacement, Type A aortic dissection s/p repair c/b ischemic bowel s/p resection, seizure disorder, and recent hospital admission  for PNA (01/2025) who is presenting the [facility] ED after he was informed his left upper extremity fistula was not accessible when presenting for HD today, reports last HD was 5 days PTA. Patient reports that after his last HD session he had significant edema and pain from his fistula site, was counseled it should self resolve. At time of exam in ED, patient without any complaints. 
SURGERY CONSULT  ==============================================================================================================  HPI: 64y Male known to the vascular surgery service who presents to the ED with history of malfunctioned left AV graft. Patient has a history of left axillary brachial loop graft creation in august 2024, LUE PSA repair in June 2024, Ex lap 2/2 SBO and mesenteric ischemia in October 2021. He normally has HD on TTS, however, on Thursday prior to presentation, patient had successful dialysis but developed large bruising to his arm and chest wall without pain. On Saturday, his arm was swollen and he was unable to have dialysis Followed by day of presentation he was again unable to complete dialysis due to suspected thrombosis of AV graft. Labs significant for worsening from baseline kidney function, VSS and patient afebrile. No other issues or concerns at this time.       PAST MEDICAL & SURGICAL HISTORY:  CHF (congestive heart failure)      CVA (cerebral vascular accident)      Seizures  last seizure 10 years ago      HTN (hypertension)      Hyperlipemia      COVID-19  october 2020      Atrial fibrillation      Former smoker      History of cocaine use  remote hx, currently sober      H/O aortic dissection  s/p repair (2010), surgery complicated by bowel ischemia s/p bowel resection and ostomy with reversal      H/O aortic valve insufficiency      Mitral regurgitation      GIB (gastrointestinal bleeding)      CAD (coronary artery disease)  s/p PCI 1998  and CABG x 2 11/2020      Chronic atrial fibrillation      Aneurysm of artery of upper extremity      Anemia of chronic disease      End stage renal disease      Myocardial infarction      COVID-19 vaccine series completed      Port-A-Cath in place      H/O colectomy      Status post double vessel coronary artery bypass  11/2020 Dr Butler      S/P AVR (aortic valve replacement)  (t)AVR 11/2020 Dr Butler      S/P MVR (mitral valve replacement)  (t)MVR 11/2020 Dr Butler      H/O aortic dissection  Type A; emergent repair 2010      AV fistula  LUE      History of renal transplant      Presence of Watchman left atrial appendage closure device        Home Meds: Home Medications:  acetaminophen 325 mg oral tablet: 2 tab(s) orally every 6 hours As needed Temp greater or equal to 38C (100.4F), Mild Pain (1 - 3) (16 Feb 2025 16:41)  amLODIPine 10 mg oral tablet: 1 tab(s) orally once a day (16 Feb 2025 16:54)  atorvastatin 40 mg oral tablet: 1 tab(s) orally once a day (at bedtime) (16 Feb 2025 16:41)  carvedilol 6.25 mg oral tablet: 1 tab(s) orally 2 times a day (16 Feb 2025 16:55)  cloNIDine 0.3 mg oral tablet: 1 tab(s) orally 2 times a day (16 Feb 2025 16:56)  hydrALAZINE 100 mg oral tablet: 1 tab(s) orally 2 times a day (16 Feb 2025 16:56)  isosorbide mononitrate 60 mg oral tablet, extended release: 1 tab(s) orally once a day (16 Feb 2025 16:41)  levETIRAcetam 500 mg oral tablet: 1 tab(s) orally 2 times a day (16 Feb 2025 16:41)  Lomotil 2.5 mg-0.025 mg oral tablet: 1 tab(s) orally once a day (17 Feb 2025 11:02)  tamsulosin 0.4 mg oral capsule: 1 cap(s) orally once a day (at bedtime) (16 Feb 2025 16:41)  torsemide 20 mg oral tablet: 1 tab(s) orally once a day (16 Feb 2025 16:56)    Allergies: Allergies    penicillin (Other)    Intolerances      Soc:   Advanced Directives: Presumed Full Code     CURRENT MEDICATIONS:   --------------------------------------------------------------------------------------  Neurologic Medications  morphine  - Injectable 4 milliGRAM(s) IV Push Once  ondansetron Injectable 4 milliGRAM(s) IV Push once    Respiratory Medications    Cardiovascular Medications    Gastrointestinal Medications    Genitourinary Medications    Hematologic/Oncologic Medications    Antimicrobial/Immunologic Medications    Endocrine/Metabolic Medications    Topical/Other Medications  sodium polystyrene sulfonate Suspension 15 Gram(s) Oral Once    --------------------------------------------------------------------------------------    VITAL SIGNS, INS/OUTS (last 24 hours):  --------------------------------------------------------------------------------------  ICU Vital Signs Last 24 Hrs  T(C): 36.4 (18 Mar 2025 20:17), Max: 36.4 (18 Mar 2025 20:17)  T(F): 97.6 (18 Mar 2025 20:17), Max: 97.6 (18 Mar 2025 20:17)  HR: 44 (18 Mar 2025 20:17) (44 - 61)  BP: 133/52 (18 Mar 2025 20:17) (133/52 - 195/88)  BP(mean): --  ABP: --  ABP(mean): --  RR: 16 (18 Mar 2025 20:17) (16 - 18)  SpO2: 96% (18 Mar 2025 20:17) (96% - 98%)    O2 Parameters below as of 18 Mar 2025 20:17  Patient On (Oxygen Delivery Method): room air          I&O's Summary    --------------------------------------------------------------------------------------    EXAM:    Constitutional: patient appears comfortable resting in bed, in no apparent distress  HEENT: head normocephalic and atraumatic,  Respiratory: respirations are unlabored, no accessory muscle use, no conversational dyspnea  Gastrointestinal: abdomen is soft & non-distended, non-tender, no rebound tenderness / guarding, ecchymoses to left flank and chest wall  Neurological: GCS15, A&O x 3  Musculoskeletal: CORDOVA x 4 spontaneously, extremities are without point tenderness or deformity  Skin: mucous membranes moist, no diaphoresis, pallor, cyanosis or jaundice, ecchymoses to left upper arm, left chest wall and left flank  Vascular: Palpable left radial pulse, faint thrill over loop distal portion of AV graft, overlying bruising but non-tender, no skin breakdown or evidence of infection    LABS  --------------------------------------------------------------------------------------  Labs:  CAPILLARY BLOOD GLUCOSE                          9.9    4.70  )-----------( 111      ( 18 Mar 2025 18:30 )             32.1       Auto Neutrophil %: 57.9 % (03-18-25 @ 18:30)  Auto Immature Granulocyte %: 0.2 % (03-18-25 @ 18:30)    03-18    139  |  96  |  91.6[H]  ----------------------------<  84  5.6[H]   |  22.0  |  8.42[H]      Calcium: 8.9 mg/dL (03-18-25 @ 18:30)      LFTs:             7.1  | 1.2  | 16       ------------------[101     ( 18 Mar 2025 18:30 )  4.2  | x    | 9           Lipase:x      Amylase:x          Coags:     14.4   ----< 1.28    ( 18 Mar 2025 18:30 )     38.3        Urinalysis Basic - ( 18 Mar 2025 18:30 )    Color: x / Appearance: x / SG: x / pH: x  Gluc: 84 mg/dL / Ketone: x  / Bili: x / Urobili: x   Blood: x / Protein: x / Nitrite: x   Leuk Esterase: x / RBC: x / WBC x   Sq Epi: x / Non Sq Epi: x / Bacteria: x  
Patient is a 64y old  Male who presents with a chief complaint of     HPI:  ESRD - last had HD Thurs 3/13 .  Sustained infiltration to LUE arm access causing sig hematoma with tracking down left chest wall  + swelling   Missed HD Sat   Was told to come to ER today   Still has good UO and does not gain much weight   No SOB or CP   Vascular Sx to see  Doppler ordered       PAST MEDICAL & SURGICAL HISTORY:  CHF (congestive heart failure)      CVA (cerebral vascular accident)      Seizures  last seizure 10 years ago      HTN (hypertension)      Hyperlipemia      COVID-19  october 2020      Atrial fibrillation      Former smoker      History of cocaine use  remote hx, currently sober      H/O aortic dissection  s/p repair (2010), surgery complicated by bowel ischemia s/p bowel resection and ostomy with reversal      H/O aortic valve insufficiency      Mitral regurgitation      GIB (gastrointestinal bleeding)      CAD (coronary artery disease)  s/p PCI 1998  and CABG x 2 11/2020      Chronic atrial fibrillation      Aneurysm of artery of upper extremity      Anemia of chronic disease      End stage renal disease      Myocardial infarction      COVID-19 vaccine series completed      Port-A-Cath in place      H/O colectomy      Status post double vessel coronary artery bypass  11/2020 Dr Butler      S/P AVR (aortic valve replacement)  (t)AVR 11/2020 Dr Butler      S/P MVR (mitral valve replacement)  (t)MVR 11/2020 Dr Butler      H/O aortic dissection  Type A; emergent repair 2010      AV fistula  LUE      History of renal transplant      Presence of Watchman left atrial appendage closure device          FAMILY HISTORY:  FH: hypertension    Family history of cardiac disorder (Mother)  mother        Social History:    MEDICATIONS  (STANDING):    MEDICATIONS  (PRN):      Allergies    penicillin (Other)    Intolerances          Vital Signs Last 24 Hrs  T(C): 36.4 (18 Mar 2025 20:17), Max: 36.4 (18 Mar 2025 20:17)  T(F): 97.6 (18 Mar 2025 20:17), Max: 97.6 (18 Mar 2025 20:17)  HR: 44 (18 Mar 2025 20:17) (44 - 61)  BP: 133/52 (18 Mar 2025 20:17) (133/52 - 195/88)  BP(mean): --  RR: 16 (18 Mar 2025 20:17) (16 - 18)  SpO2: 96% (18 Mar 2025 20:17) (96% - 98%)    Parameters below as of 18 Mar 2025 20:17  Patient On (Oxygen Delivery Method): room air      Daily Height in cm: 167.64 (18 Mar 2025 16:00)    Daily   I&O's Detail    I&O's Summary      PHYSICAL EXAM:    GENERAL: NAD,d  HEAD:  Atraumatic, Normocephalic  NECK: Supple, No JVD,   NERVOUS SYSTEM:  Alert & Oriented X3,   CHEST/LUNG: EAE; No rales, rhonchi, wheezing, or rubs  HEART: Regular rate and rhythm; No murmurs, rubs, or gallops  ABDOMEN: Soft, Nontender, Nondistended; Bowel sounds present  EXTREMITIES: La rm access with swelling / hematoma . No drainage . (+) bruit         LABS:                        9.9    4.70  )-----------( 111      ( 18 Mar 2025 18:30 )             32.1     03-18    139  |  96  |  91.6[H]  ----------------------------<  84  5.6[H]   |  22.0  |  8.42[H]    Ca    8.9      18 Mar 2025 18:30  Phos  7.4     03-18  Mg     2.2     03-18    TPro  7.1  /  Alb  4.2  /  TBili  1.2  /  DBili  x   /  AST  16  /  ALT  9   /  AlkPhos  101  03-18    PT/INR - ( 18 Mar 2025 18:30 )   PT: 14.4 sec;   INR: 1.28 ratio         PTT - ( 18 Mar 2025 18:30 )  PTT:38.3 sec  Urinalysis Basic - ( 18 Mar 2025 18:30 )    Color: x / Appearance: x / SG: x / pH: x  Gluc: 84 mg/dL / Ketone: x  / Bili: x / Urobili: x   Blood: x / Protein: x / Nitrite: x   Leuk Esterase: x / RBC: x / WBC x   Sq Epi: x / Non Sq Epi: x / Bacteria: x      Magnesium: 2.2 mg/dL (03-18 @ 18:30)  Phosphorus: 7.4 mg/dL (03-18 @ 18:30)          RADIOLOGY & ADDITIONAL TESTS:

## 2025-03-19 NOTE — PROGRESS NOTE ADULT - SUBJECTIVE AND OBJECTIVE BOX
Rolando Walton M.D.    Patient is a 64y old  Male who presents with a chief complaint of Missed HD due to malfunctioning AV graft (19 Mar 2025 07:07)      SUBJECTIVE / OVERNIGHT EVENTS: no event overnight.     Patient denies chest pain, SOB, abd pain, N/V, fever, chills, dysuria or any other complaints. All remainder ROS negative.     MEDICATIONS  (STANDING):  amLODIPine   Tablet 10 milliGRAM(s) Oral daily  atorvastatin 40 milliGRAM(s) Oral at bedtime  carvedilol 3.125 milliGRAM(s) Oral every 12 hours  cloNIDine 0.3 milliGRAM(s) Oral two times a day  epoetin yolanda-epbx (RETACRIT) Injectable 69316 Unit(s) IV Push once  hydrALAZINE 100 milliGRAM(s) Oral two times a day  isosorbide   mononitrate ER Tablet (IMDUR) 60 milliGRAM(s) Oral daily  levETIRAcetam 500 milliGRAM(s) Oral two times a day  pantoprazole    Tablet 40 milliGRAM(s) Oral before breakfast  sevelamer carbonate 1600 milliGRAM(s) Oral three times a day with meals  tamsulosin 0.4 milliGRAM(s) Oral at bedtime  torsemide 20 milliGRAM(s) Oral daily    MEDICATIONS  (PRN):  acetaminophen     Tablet .. 650 milliGRAM(s) Oral every 6 hours PRN Temp greater or equal to 38C (100.4F), Mild Pain (1 - 3)  aluminum hydroxide/magnesium hydroxide/simethicone Suspension 30 milliLiter(s) Oral every 4 hours PRN Dyspepsia  melatonin 3 milliGRAM(s) Oral at bedtime PRN Insomnia  ondansetron Injectable 4 milliGRAM(s) IV Push every 8 hours PRN Nausea and/or Vomiting      I&O's Summary      PHYSICAL EXAM:  Vital Signs Last 24 Hrs  T(C): 36.5 (19 Mar 2025 08:12), Max: 36.5 (18 Mar 2025 23:18)  T(F): 97.7 (19 Mar 2025 08:12), Max: 97.7 (18 Mar 2025 23:18)  HR: 61 (19 Mar 2025 08:12) (44 - 61)  BP: 163/64 (19 Mar 2025 08:12) (133/52 - 195/88)  BP(mean): 86 (18 Mar 2025 23:18) (86 - 86)  RR: 17 (19 Mar 2025 08:12) (16 - 18)  SpO2: 92% (19 Mar 2025 08:12) (91% - 98%)    Parameters below as of 19 Mar 2025 08:12  Patient On (Oxygen Delivery Method): room air      CONSTITUTIONAL: NAD, well-groomed  RESPIRATORY: Normal respiratory effort; lungs are clear to auscultation bilaterally  CARDIOVASCULAR: Regular rate and rhythm, no LE edema  ABDOMEN: Nontender to palpation, normoactive bowel sounds  EXTREMITY: LUE: + erythema, minimal thrill noted    LABS:                        9.7    4.65  )-----------( 94       ( 19 Mar 2025 04:50 )             31.5     03-19    139  |  101  |  93.8[H]  ----------------------------<  82  5.0   |  22.0  |  8.76[H]    Ca    8.8      19 Mar 2025 04:50  Phos  7.7     03-19  Mg     2.2     03-18    TPro  7.1  /  Alb  4.2  /  TBili  1.2  /  DBili  x   /  AST  16  /  ALT  9   /  AlkPhos  101  03-18    PT/INR - ( 18 Mar 2025 18:30 )   PT: 14.4 sec;   INR: 1.28 ratio         PTT - ( 18 Mar 2025 18:30 )  PTT:38.3 sec      Urinalysis Basic - ( 19 Mar 2025 04:50 )    Color: x / Appearance: x / SG: x / pH: x  Gluc: 82 mg/dL / Ketone: x  / Bili: x / Urobili: x   Blood: x / Protein: x / Nitrite: x   Leuk Esterase: x / RBC: x / WBC x   Sq Epi: x / Non Sq Epi: x / Bacteria: x        CAPILLARY BLOOD GLUCOSE          RADIOLOGY & ADDITIONAL TESTS:  Results Reviewed:   Imaging Personally Reviewed:  Electrocardiogram Personally Reviewed:

## 2025-03-20 LAB
BASOPHILS # BLD MANUAL: 0 K/UL — SIGNIFICANT CHANGE UP (ref 0–0.2)
BASOPHILS NFR BLD MANUAL: 0 % — SIGNIFICANT CHANGE UP (ref 0–2)
BLD GP AB SCN SERPL QL: SIGNIFICANT CHANGE UP
BUN SERPL-MCNC: 50.5 MG/DL — HIGH (ref 8–20)
CALCIUM SERPL-MCNC: 8.3 MG/DL — LOW (ref 8.4–10.5)
CHLORIDE SERPL-SCNC: 101 MMOL/L — SIGNIFICANT CHANGE UP (ref 96–108)
CO2 SERPL-SCNC: 26 MMOL/L — SIGNIFICANT CHANGE UP (ref 22–29)
CREAT SERPL-MCNC: 6.09 MG/DL — HIGH (ref 0.5–1.3)
DIR ANTIGLOB POLYSPECIFIC INTERPRETATION: SIGNIFICANT CHANGE UP
EOSINOPHIL # BLD MANUAL: 0.26 K/UL — SIGNIFICANT CHANGE UP (ref 0–0.5)
EOSINOPHIL NFR BLD MANUAL: 6.9 % — HIGH (ref 0–6)
FOLATE SERPL-MCNC: 9.2 NG/ML — SIGNIFICANT CHANGE UP
GLUCOSE SERPL-MCNC: 84 MG/DL — SIGNIFICANT CHANGE UP (ref 70–99)
HCT VFR BLD CALC: 28.1 % — LOW (ref 39–50)
HGB BLD-MCNC: 8.8 G/DL — LOW (ref 13–17)
IMMATURE PLATELET FRACTION #: 2.7 K/UL — LOW (ref 3.9–12.5)
IMMATURE PLATELET FRACTION %: 3.8 % — SIGNIFICANT CHANGE UP (ref 1.6–7.1)
LYMPHOCYTES NFR BLD MANUAL: 14.7 % — SIGNIFICANT CHANGE UP (ref 13–44)
MANUAL REACTIVE LYMPHOCYTES #: 0.03 K/UL — SIGNIFICANT CHANGE UP (ref 0–0.63)
MCHC RBC-ENTMCNC: 31 PG — SIGNIFICANT CHANGE UP (ref 27–34)
MCHC RBC-ENTMCNC: 31.3 G/DL — LOW (ref 32–36)
MCV RBC AUTO: 98.9 FL — SIGNIFICANT CHANGE UP (ref 80–100)
MONOCYTES # BLD MANUAL: 0.2 K/UL — SIGNIFICANT CHANGE UP (ref 0–0.9)
MONOCYTES NFR BLD MANUAL: 5.2 % — SIGNIFICANT CHANGE UP (ref 2–14)
MRSA PCR RESULT.: SIGNIFICANT CHANGE UP
NEUTROPHILS # BLD MANUAL: 2.73 K/UL — SIGNIFICANT CHANGE UP (ref 1.8–7.4)
NEUTROPHILS NFR BLD MANUAL: 72.3 % — SIGNIFICANT CHANGE UP (ref 43–77)
NRBC # BLD AUTO: 0 K/UL — SIGNIFICANT CHANGE UP (ref 0–0)
NRBC # FLD: 0 K/UL — SIGNIFICANT CHANGE UP (ref 0–0)
NRBC BLD AUTO-RTO: 0 /100 WBCS — SIGNIFICANT CHANGE UP (ref 0–0)
OVALOCYTES BLD QL SMEAR: SLIGHT — SIGNIFICANT CHANGE UP
PLAT MORPH BLD: NORMAL — SIGNIFICANT CHANGE UP
PLATELET # BLD AUTO: 72 K/UL — LOW (ref 150–400)
PMV BLD: 11.6 FL — SIGNIFICANT CHANGE UP (ref 7–13)
POIKILOCYTOSIS BLD QL AUTO: SLIGHT — SIGNIFICANT CHANGE UP
POTASSIUM SERPL-MCNC: 4.2 MMOL/L — SIGNIFICANT CHANGE UP (ref 3.5–5.3)
POTASSIUM SERPL-SCNC: 4.2 MMOL/L — SIGNIFICANT CHANGE UP (ref 3.5–5.3)
RBC # BLD: 2.84 M/UL — LOW (ref 4.2–5.8)
RBC # FLD: 13.8 % — SIGNIFICANT CHANGE UP (ref 10.3–14.5)
RBC BLD AUTO: ABNORMAL
S AUREUS DNA NOSE QL NAA+PROBE: SIGNIFICANT CHANGE UP
SODIUM SERPL-SCNC: 141 MMOL/L — SIGNIFICANT CHANGE UP (ref 135–145)
VARIANT LYMPHS # BLD: 0.9 % — SIGNIFICANT CHANGE UP (ref 0–6)
VARIANT LYMPHS NFR BLD MANUAL: 0.9 % — SIGNIFICANT CHANGE UP (ref 0–6)
VIT B12 SERPL-MCNC: 722 PG/ML — SIGNIFICANT CHANGE UP (ref 232–1245)
WBC # BLD: 3.77 K/UL — LOW (ref 3.8–10.5)
WBC # FLD AUTO: 3.77 K/UL — LOW (ref 3.8–10.5)

## 2025-03-20 PROCEDURE — 36558 INSERT TUNNELED CV CATH: CPT | Mod: RT

## 2025-03-20 PROCEDURE — 93971 EXTREMITY STUDY: CPT | Mod: 26,LT

## 2025-03-20 PROCEDURE — 99232 SBSQ HOSP IP/OBS MODERATE 35: CPT

## 2025-03-20 PROCEDURE — 76937 US GUIDE VASCULAR ACCESS: CPT | Mod: 26,59

## 2025-03-20 PROCEDURE — 77001 FLUOROGUIDE FOR VEIN DEVICE: CPT | Mod: 26

## 2025-03-20 RX ORDER — AMLODIPINE BESYLATE 10 MG/1
10 TABLET ORAL DAILY
Refills: 0 | Status: DISCONTINUED | OUTPATIENT
Start: 2025-03-20 | End: 2025-03-21

## 2025-03-20 RX ORDER — HYDROMORPHONE/SOD CHLOR,ISO/PF 2 MG/10 ML
0.5 SYRINGE (ML) INJECTION ONCE
Refills: 0 | Status: DISCONTINUED | OUTPATIENT
Start: 2025-03-20 | End: 2025-03-20

## 2025-03-20 RX ORDER — EPOETIN ALFA 10000 [IU]/ML
10000 SOLUTION INTRAVENOUS; SUBCUTANEOUS ONCE
Refills: 0 | Status: COMPLETED | OUTPATIENT
Start: 2025-03-21 | End: 2025-03-21

## 2025-03-20 RX ORDER — ISOSORBIDE MONONITRATE 60 MG/1
60 TABLET, EXTENDED RELEASE ORAL DAILY
Refills: 0 | Status: DISCONTINUED | OUTPATIENT
Start: 2025-03-20 | End: 2025-03-21

## 2025-03-20 RX ORDER — OXYCODONE HYDROCHLORIDE 30 MG/1
2.5 TABLET ORAL
Refills: 0 | Status: DISCONTINUED | OUTPATIENT
Start: 2025-03-20 | End: 2025-03-21

## 2025-03-20 RX ORDER — HYDROMORPHONE/SOD CHLOR,ISO/PF 2 MG/10 ML
0.2 SYRINGE (ML) INJECTION ONCE
Refills: 0 | Status: DISCONTINUED | OUTPATIENT
Start: 2025-03-20 | End: 2025-03-20

## 2025-03-20 RX ADMIN — Medication 0.2 MILLIGRAM(S): at 03:24

## 2025-03-20 RX ADMIN — Medication 0.5 MILLIGRAM(S): at 20:54

## 2025-03-20 RX ADMIN — CARVEDILOL 3.12 MILLIGRAM(S): 3.12 TABLET, FILM COATED ORAL at 17:30

## 2025-03-20 RX ADMIN — LEVETIRACETAM 500 MILLIGRAM(S): 10 INJECTION, SOLUTION INTRAVENOUS at 17:30

## 2025-03-20 RX ADMIN — LEVETIRACETAM 500 MILLIGRAM(S): 10 INJECTION, SOLUTION INTRAVENOUS at 05:34

## 2025-03-20 RX ADMIN — Medication 0.3 MILLIGRAM(S): at 17:30

## 2025-03-20 RX ADMIN — TAMSULOSIN HYDROCHLORIDE 0.4 MILLIGRAM(S): 0.4 CAPSULE ORAL at 20:56

## 2025-03-20 RX ADMIN — ISOSORBIDE MONONITRATE 60 MILLIGRAM(S): 60 TABLET, EXTENDED RELEASE ORAL at 11:38

## 2025-03-20 RX ADMIN — Medication 0.2 MILLIGRAM(S): at 04:24

## 2025-03-20 RX ADMIN — Medication 1 APPLICATION(S): at 05:35

## 2025-03-20 RX ADMIN — Medication 0.5 MILLIGRAM(S): at 21:54

## 2025-03-20 RX ADMIN — Medication 100 MILLIGRAM(S): at 17:30

## 2025-03-20 RX ADMIN — Medication 0.5 MILLIGRAM(S): at 12:10

## 2025-03-20 RX ADMIN — ATORVASTATIN CALCIUM 40 MILLIGRAM(S): 80 TABLET, FILM COATED ORAL at 20:56

## 2025-03-20 RX ADMIN — Medication 0.5 MILLIGRAM(S): at 11:39

## 2025-03-20 NOTE — PROCEDURE NOTE - PROCEDURE FINDINGS AND DETAILS
19cm Tunnelled HD Catheter placed into Right IJV under US and fluoroscopic guidance. Tip at cavoatrial junction, OK to use.    Please call Interventional Radiology with any questions, concerns, or issues.

## 2025-03-20 NOTE — PROGRESS NOTE ADULT - ASSESSMENT
Mr. Cummins is 63 y/o M w/ PMHx of ESRD (on HD tu/th/sa), Afib (not on AC 2/2 prior GIB now s/p watchman), CAD s/p CABG, former substance use disorder, Mitral valve infective endocarditis (2023), aortic and mitral valve replacement, Type A aortic dissection s/p repair c/b ischemic bowel s/p resection, seizure disorder, and recent hospital admission  for PNA (01/2025) admitted to [facility] for missed HD due to suspected fistula thrombosis.    #Missed HD  #ESRD  #Malfunctioning AV graft   - maintain tele/  - LUE fistula access w/ faintly palpable thrill, TTP, and extensive ecchymosis  - us noted with stenosis at the proximal anastomosis  - Pending IR for permacath  - vascular following, randy recs  - HD per renal  - continue sevelamer 1600 TID    #hyperkalemia  #hyperphosphatemia  - Trend BMP  - 1x Kayexalate   - Trend ECG as derangements worsen pending HD    #seizure disorder  - continue home Keppra 500 BID  - Seizure precautions    #Afib  - no AC d/t hx of GI bleed, s/p watchman  - pt with bradycardia down to 30s per RN  - Decrease Carvedilol to 3.125BID    #anemia  - chronic likely 2/2 ESRD  - MCV slightly macrocytic  - f/u B12 and folate    #HTN  Continue home medications:  - amlodipine 10 QD  - Hydralazine 100 BID  - carvedilol 6.25 BID  - torsemide 20 QD  - clonidine .3 BID    #HLD  #CAD  - cw home atorvastatin 40 QD    DVT ppx: SCDs, start chem dvt ppx post surgery, patient ambulatory, encourage ambulation  Dispo: pending IR for permacath and vascular clearance

## 2025-03-20 NOTE — PROGRESS NOTE ADULT - SUBJECTIVE AND OBJECTIVE BOX
HPI/OVERNIGHT EVENTS: Patient seen and examined at bedside this AM. No overnight events. No complaints. tolerated dialysis through RUE AVF.     Vital Signs Last 24 Hrs  T(C): 36.7 (20 Mar 2025 05:05), Max: 36.9 (20 Mar 2025 03:00)  T(F): 98.1 (20 Mar 2025 05:05), Max: 98.4 (20 Mar 2025 03:00)  HR: 55 (20 Mar 2025 05:05) (55 - 66)  BP: 125/73 (20 Mar 2025 05:05) (121/61 - 163/64)  BP(mean): --  RR: 18 (20 Mar 2025 05:05) (17 - 18)  SpO2: 92% (20 Mar 2025 05:05) (91% - 94%)    Parameters below as of 20 Mar 2025 05:05  Patient On (Oxygen Delivery Method): room air        I&O's Detail    19 Mar 2025 07:01  -  20 Mar 2025 06:44  --------------------------------------------------------  IN:    Other (mL): 800 mL  Total IN: 800 mL    OUT:    Other (mL): 1800 mL  Total OUT: 1800 mL    Total NET: -1000 mL          Constitutional: patient resting comfortably in bed, in no acute distress  HEENT: EOMI, PERRLA, MMM.  Respiratory: Non labored breathing on RA  Cardiovascular: RRR  Gastrointestinal: Abdomen soft, non-tender  Musculoskeletal: RUE AVF with good thrill and bruises and ecchymosis     LABS:                        9.7    4.65  )-----------( 94       ( 19 Mar 2025 04:50 )             31.5     03-19    139  |  101  |  93.8[H]  ----------------------------<  82  5.0   |  22.0  |  8.76[H]    Ca    8.8      19 Mar 2025 04:50  Phos  7.7     03-19  Mg     2.2     03-18    TPro  7.1  /  Alb  4.2  /  TBili  1.2  /  DBili  x   /  AST  16  /  ALT  9   /  AlkPhos  101  03-18    PT/INR - ( 18 Mar 2025 18:30 )   PT: 14.4 sec;   INR: 1.28 ratio         PTT - ( 18 Mar 2025 18:30 )  PTT:38.3 sec  Urinalysis Basic - ( 19 Mar 2025 04:50 )    Color: x / Appearance: x / SG: x / pH: x  Gluc: 82 mg/dL / Ketone: x  / Bili: x / Urobili: x   Blood: x / Protein: x / Nitrite: x   Leuk Esterase: x / RBC: x / WBC x   Sq Epi: x / Non Sq Epi: x / Bacteria: x        MEDICATIONS  (STANDING):  amLODIPine   Tablet 10 milliGRAM(s) Oral daily  atorvastatin 40 milliGRAM(s) Oral at bedtime  carvedilol 3.125 milliGRAM(s) Oral every 12 hours  chlorhexidine 2% Cloths 1 Application(s) Topical <User Schedule>  cloNIDine 0.3 milliGRAM(s) Oral two times a day  hydrALAZINE 100 milliGRAM(s) Oral two times a day  isosorbide   mononitrate ER Tablet (IMDUR) 60 milliGRAM(s) Oral daily  levETIRAcetam 500 milliGRAM(s) Oral two times a day  pantoprazole    Tablet 40 milliGRAM(s) Oral before breakfast  sevelamer carbonate 1600 milliGRAM(s) Oral three times a day with meals  tamsulosin 0.4 milliGRAM(s) Oral at bedtime  torsemide 20 milliGRAM(s) Oral daily    MEDICATIONS  (PRN):  acetaminophen     Tablet .. 650 milliGRAM(s) Oral every 6 hours PRN Temp greater or equal to 38C (100.4F), Mild Pain (1 - 3)  aluminum hydroxide/magnesium hydroxide/simethicone Suspension 30 milliLiter(s) Oral every 4 hours PRN Dyspepsia  melatonin 3 milliGRAM(s) Oral at bedtime PRN Insomnia  ondansetron Injectable 4 milliGRAM(s) IV Push every 8 hours PRN Nausea and/or Vomiting      MICRO:   Cultures     STUDIES:   EKG, CXR, U/S, CT, MRI    HPI/OVERNIGHT EVENTS: Patient seen and examined at bedside this AM. No overnight events. No complaints. tolerated dialysis through LUE AVF.     Vital Signs Last 24 Hrs  T(C): 36.7 (20 Mar 2025 05:05), Max: 36.9 (20 Mar 2025 03:00)  T(F): 98.1 (20 Mar 2025 05:05), Max: 98.4 (20 Mar 2025 03:00)  HR: 55 (20 Mar 2025 05:05) (55 - 66)  BP: 125/73 (20 Mar 2025 05:05) (121/61 - 163/64)  BP(mean): --  RR: 18 (20 Mar 2025 05:05) (17 - 18)  SpO2: 92% (20 Mar 2025 05:05) (91% - 94%)    Parameters below as of 20 Mar 2025 05:05  Patient On (Oxygen Delivery Method): room air        I&O's Detail    19 Mar 2025 07:01  -  20 Mar 2025 06:44  --------------------------------------------------------  IN:    Other (mL): 800 mL  Total IN: 800 mL    OUT:    Other (mL): 1800 mL  Total OUT: 1800 mL    Total NET: -1000 mL          Constitutional: patient resting comfortably in bed, in no acute distress  HEENT: EOMI, PERRLA, MMM.  Respiratory: Non labored breathing on RA  Cardiovascular: RRR  Gastrointestinal: Abdomen soft, non-tender  Musculoskeletal: LUE AVF with good thrill and bruises and ecchymosis     LABS:                        9.7    4.65  )-----------( 94       ( 19 Mar 2025 04:50 )             31.5     03-19    139  |  101  |  93.8[H]  ----------------------------<  82  5.0   |  22.0  |  8.76[H]    Ca    8.8      19 Mar 2025 04:50  Phos  7.7     03-19  Mg     2.2     03-18    TPro  7.1  /  Alb  4.2  /  TBili  1.2  /  DBili  x   /  AST  16  /  ALT  9   /  AlkPhos  101  03-18    PT/INR - ( 18 Mar 2025 18:30 )   PT: 14.4 sec;   INR: 1.28 ratio         PTT - ( 18 Mar 2025 18:30 )  PTT:38.3 sec  Urinalysis Basic - ( 19 Mar 2025 04:50 )    Color: x / Appearance: x / SG: x / pH: x  Gluc: 82 mg/dL / Ketone: x  / Bili: x / Urobili: x   Blood: x / Protein: x / Nitrite: x   Leuk Esterase: x / RBC: x / WBC x   Sq Epi: x / Non Sq Epi: x / Bacteria: x        MEDICATIONS  (STANDING):  amLODIPine   Tablet 10 milliGRAM(s) Oral daily  atorvastatin 40 milliGRAM(s) Oral at bedtime  carvedilol 3.125 milliGRAM(s) Oral every 12 hours  chlorhexidine 2% Cloths 1 Application(s) Topical <User Schedule>  cloNIDine 0.3 milliGRAM(s) Oral two times a day  hydrALAZINE 100 milliGRAM(s) Oral two times a day  isosorbide   mononitrate ER Tablet (IMDUR) 60 milliGRAM(s) Oral daily  levETIRAcetam 500 milliGRAM(s) Oral two times a day  pantoprazole    Tablet 40 milliGRAM(s) Oral before breakfast  sevelamer carbonate 1600 milliGRAM(s) Oral three times a day with meals  tamsulosin 0.4 milliGRAM(s) Oral at bedtime  torsemide 20 milliGRAM(s) Oral daily    MEDICATIONS  (PRN):  acetaminophen     Tablet .. 650 milliGRAM(s) Oral every 6 hours PRN Temp greater or equal to 38C (100.4F), Mild Pain (1 - 3)  aluminum hydroxide/magnesium hydroxide/simethicone Suspension 30 milliLiter(s) Oral every 4 hours PRN Dyspepsia  melatonin 3 milliGRAM(s) Oral at bedtime PRN Insomnia  ondansetron Injectable 4 milliGRAM(s) IV Push every 8 hours PRN Nausea and/or Vomiting      MICRO:   Cultures     STUDIES:   EKG, CXR, U/S, CT, MRI

## 2025-03-20 NOTE — PROGRESS NOTE ADULT - SUBJECTIVE AND OBJECTIVE BOX
NEPHROLOGY INTERVAL HPI/OVERNIGHT EVENTS:    feels better  Awaits permacath   Afebrile / VSS    MEDICATIONS  (STANDING):  amLODIPine   Tablet 10 milliGRAM(s) Oral daily  atorvastatin 40 milliGRAM(s) Oral at bedtime  carvedilol 3.125 milliGRAM(s) Oral every 12 hours  chlorhexidine 2% Cloths 1 Application(s) Topical <User Schedule>  cloNIDine 0.3 milliGRAM(s) Oral two times a day  hydrALAZINE 100 milliGRAM(s) Oral two times a day  isosorbide   mononitrate ER Tablet (IMDUR) 60 milliGRAM(s) Oral daily  levETIRAcetam 500 milliGRAM(s) Oral two times a day  pantoprazole    Tablet 40 milliGRAM(s) Oral before breakfast  sevelamer carbonate 1600 milliGRAM(s) Oral three times a day with meals  tamsulosin 0.4 milliGRAM(s) Oral at bedtime  torsemide 20 milliGRAM(s) Oral daily    MEDICATIONS  (PRN):  acetaminophen     Tablet .. 650 milliGRAM(s) Oral every 6 hours PRN Temp greater or equal to 38C (100.4F), Mild Pain (1 - 3)  aluminum hydroxide/magnesium hydroxide/simethicone Suspension 30 milliLiter(s) Oral every 4 hours PRN Dyspepsia  melatonin 3 milliGRAM(s) Oral at bedtime PRN Insomnia  ondansetron Injectable 4 milliGRAM(s) IV Push every 8 hours PRN Nausea and/or Vomiting  oxyCODONE    IR 2.5 milliGRAM(s) Oral four times a day PRN Moderate Pain (4 - 6) and Severe Pain (7-10)      Allergies    penicillin (Other)    Intolerances          Vital Signs Last 24 Hrs  T(C): 36.6 (20 Mar 2025 11:19), Max: 37.2 (20 Mar 2025 08:16)  T(F): 97.9 (20 Mar 2025 11:19), Max: 99 (20 Mar 2025 08:16)  HR: 58 (20 Mar 2025 11:19) (55 - 66)  BP: 145/60 (20 Mar 2025 11:19) (121/61 - 158/69)  BP(mean): 80 (20 Mar 2025 08:16) (80 - 80)  RR: 18 (20 Mar 2025 11:19) (17 - 18)  SpO2: 94% (20 Mar 2025 11:19) (91% - 95%)    Parameters below as of 20 Mar 2025 11:19  Patient On (Oxygen Delivery Method): room air      Daily     Daily Weight in k (19 Mar 2025 15:00)  I&O's Detail    19 Mar 2025 07:01  -  20 Mar 2025 07:00  --------------------------------------------------------  IN:    Oral Fluid: 230 mL    Other (mL): 800 mL  Total IN: 1030 mL    OUT:    Other (mL): 1800 mL  Total OUT: 1800 mL    Total NET: -770 mL        I&O's Summary    19 Mar 2025 07:01  -  20 Mar 2025 07:00  --------------------------------------------------------  IN: 1030 mL / OUT: 1800 mL / NET: -770 mL        PHYSICAL EXAM:      GENERAL: NAD  HEAD:  Atraumatic, Normocephalic  NECK: Supple, No JVD,   NERVOUS SYSTEM:  Alert & Oriented X3,   CHEST/LUNG: EAE; No rales, rhonchi, wheezing, or rubs  HEART: Regular rate and rhythm; No murmurs, rubs, or gallops  ABDOMEN: Soft, Nontender, Nondistended; Bowel sounds present  EXTREMITIES: La rm access with swelling / hematoma . No drainage . (+) bruit   LABS:                        8.8    3.77  )-----------( 72       ( 20 Mar 2025 07:19 )             28.1     03-20    141  |  101  |  50.5[H]  ----------------------------<  84  4.2   |  26.0  |  6.09[H]    Ca    8.3[L]      20 Mar 2025 07:19  Phos  7.7     -  Mg     2.1     03-20    TPro  7.1  /  Alb  4.2  /  TBili  1.2  /  DBili  x   /  AST  16  /  ALT  9   /  AlkPhos  101      PT/INR - ( 18 Mar 2025 18:30 )   PT: 14.4 sec;   INR: 1.28 ratio         PTT - ( 18 Mar 2025 18:30 )  PTT:38.3 sec  Urinalysis Basic - ( 20 Mar 2025 07:19 )    Color: x / Appearance: x / SG: x / pH: x  Gluc: 84 mg/dL / Ketone: x  / Bili: x / Urobili: x   Blood: x / Protein: x / Nitrite: x   Leuk Esterase: x / RBC: x / WBC x   Sq Epi: x / Non Sq Epi: x / Bacteria: x      Magnesium: 2.1 mg/dL ( @ 07:19)          RADIOLOGY & ADDITIONAL TESTS:

## 2025-03-20 NOTE — PROGRESS NOTE ADULT - ASSESSMENT
64y Male known to the vascular surgery service who presents to the ED with history of malfunctioned left AV graft. Patient has a history of left axillary brachial loop graft creation in august 2024, LUE PSA repair in June 2024, Ex lap 2/2 SBO and mesenteric ischemia in October 2021. He normally has HD on TTS, however, on Thursday prior to presentation, patient had successful dialysis but developed large bruising to his arm and chest wall without pain. On Saturday, his arm was swollen and he was unable to have dialysis Followed by day of presentation he was again unable to complete dialysis due to suspected thrombosis of AV graft. Labs significant for worsening from baseline kidney function, VSS and patient afebrile. No other issues or concerns at this time.     Recommendations:  - Medicine and nephrology eval appreciated   - recommend PC placement by IR   - pt to follow up outpatient   - rest of care by primary team    64y Male known to the vascular surgery service who presents to the ED with history of malfunctioned left AV graft. Patient has a history of left axillary brachial loop graft creation in august 2024, LUE PSA repair in June 2024, Ex lap 2/2 SBO and mesenteric ischemia in October 2021. He normally has HD on TTS, however, on Thursday prior to presentation, patient had successful dialysis but developed large bruising to his arm and chest wall without pain. On Saturday, his arm was swollen and he was unable to have dialysis Followed by day of presentation he was again unable to complete dialysis due to suspected thrombosis of AV graft. Labs significant for worsening from baseline kidney function, VSS and patient afebrile. No other issues or concerns at this time.     Recommendations:  - Medicine and nephrology eval appreciated   - recommend PC placement by IR   - pt to follow up outpatient in 2 weeks with Dr Crawford  - rest of care by primary team

## 2025-03-20 NOTE — PROGRESS NOTE ADULT - ASSESSMENT
ESRD - Usually TTS   Sustained infiltration to LUE arm access causing sig hematoma with tracking down left chest wall  Team here was able to use AVG , but it is going to be difficult in out-patient setting   Pt is going to need to rest arm for a period of time , would recommend proceeding with permacath placement today   HD will be set up for tomorrow via permacathy   If stable , can  HD on Tues 3/25 in [place]     Anemia - Epogen with HD , CBC am     RO - Continue binders

## 2025-03-20 NOTE — PROGRESS NOTE ADULT - SUBJECTIVE AND OBJECTIVE BOX
Rolando Walton M.D.    Patient is a 64y old  Male who presents with a chief complaint of Missed HD due to malfunctioning AV graft (20 Mar 2025 06:44)      SUBJECTIVE / OVERNIGHT EVENTS: no event overnight.     Patient denies chest pain, SOB, abd pain, N/V, fever, chills, dysuria or any other complaints. All remainder ROS negative.     MEDICATIONS  (STANDING):  amLODIPine   Tablet 10 milliGRAM(s) Oral daily  atorvastatin 40 milliGRAM(s) Oral at bedtime  carvedilol 3.125 milliGRAM(s) Oral every 12 hours  chlorhexidine 2% Cloths 1 Application(s) Topical <User Schedule>  cloNIDine 0.3 milliGRAM(s) Oral two times a day  hydrALAZINE 100 milliGRAM(s) Oral two times a day  isosorbide   mononitrate ER Tablet (IMDUR) 60 milliGRAM(s) Oral daily  levETIRAcetam 500 milliGRAM(s) Oral two times a day  pantoprazole    Tablet 40 milliGRAM(s) Oral before breakfast  sevelamer carbonate 1600 milliGRAM(s) Oral three times a day with meals  tamsulosin 0.4 milliGRAM(s) Oral at bedtime  torsemide 20 milliGRAM(s) Oral daily    MEDICATIONS  (PRN):  acetaminophen     Tablet .. 650 milliGRAM(s) Oral every 6 hours PRN Temp greater or equal to 38C (100.4F), Mild Pain (1 - 3)  aluminum hydroxide/magnesium hydroxide/simethicone Suspension 30 milliLiter(s) Oral every 4 hours PRN Dyspepsia  melatonin 3 milliGRAM(s) Oral at bedtime PRN Insomnia  ondansetron Injectable 4 milliGRAM(s) IV Push every 8 hours PRN Nausea and/or Vomiting  oxyCODONE    IR 2.5 milliGRAM(s) Oral four times a day PRN Moderate Pain (4 - 6) and Severe Pain (7-10)      I&O's Summary    19 Mar 2025 07:01  -  20 Mar 2025 07:00  --------------------------------------------------------  IN: 1030 mL / OUT: 1800 mL / NET: -770 mL        PHYSICAL EXAM:  Vital Signs Last 24 Hrs  T(C): 37.2 (20 Mar 2025 08:16), Max: 37.2 (20 Mar 2025 08:16)  T(F): 99 (20 Mar 2025 08:16), Max: 99 (20 Mar 2025 08:16)  HR: 55 (20 Mar 2025 08:16) (55 - 66)  BP: 133/53 (20 Mar 2025 08:16) (121/61 - 158/69)  BP(mean): 80 (20 Mar 2025 08:16) (80 - 80)  RR: 17 (20 Mar 2025 08:16) (17 - 18)  SpO2: 95% (20 Mar 2025 08:16) (91% - 95%)    Parameters below as of 20 Mar 2025 08:16  Patient On (Oxygen Delivery Method): room air      CONSTITUTIONAL: NAD, well-groomed  RESPIRATORY: Normal respiratory effort; lungs are clear to auscultation bilaterally  CARDIOVASCULAR: Regular rate and rhythm, no LE edema  ABDOMEN: Nontender to palpation, normoactive bowel sounds  EXTREMITY: LUE: + erythema and bruising noted, minimal thrill noted      LABS:                        8.8    3.77  )-----------( 72       ( 20 Mar 2025 07:19 )             28.1     03-20    141  |  101  |  50.5[H]  ----------------------------<  84  4.2   |  26.0  |  6.09[H]    Ca    8.3[L]      20 Mar 2025 07:19  Phos  7.7     03-19  Mg     2.1     03-20    TPro  7.1  /  Alb  4.2  /  TBili  1.2  /  DBili  x   /  AST  16  /  ALT  9   /  AlkPhos  101  03-18    PT/INR - ( 18 Mar 2025 18:30 )   PT: 14.4 sec;   INR: 1.28 ratio         PTT - ( 18 Mar 2025 18:30 )  PTT:38.3 sec      Urinalysis Basic - ( 20 Mar 2025 07:19 )    Color: x / Appearance: x / SG: x / pH: x  Gluc: 84 mg/dL / Ketone: x  / Bili: x / Urobili: x   Blood: x / Protein: x / Nitrite: x   Leuk Esterase: x / RBC: x / WBC x   Sq Epi: x / Non Sq Epi: x / Bacteria: x        CAPILLARY BLOOD GLUCOSE          RADIOLOGY & ADDITIONAL TESTS:  Results Reviewed:   Imaging Personally Reviewed:  Electrocardiogram Personally Reviewed:

## 2025-03-21 ENCOUNTER — TRANSCRIPTION ENCOUNTER (OUTPATIENT)
Age: 65
End: 2025-03-21

## 2025-03-21 VITALS
HEART RATE: 56 BPM | DIASTOLIC BLOOD PRESSURE: 65 MMHG | SYSTOLIC BLOOD PRESSURE: 149 MMHG | OXYGEN SATURATION: 93 % | TEMPERATURE: 98 F | RESPIRATION RATE: 17 BRPM

## 2025-03-21 LAB
ANION GAP SERPL CALC-SCNC: 15 MMOL/L — SIGNIFICANT CHANGE UP (ref 5–17)
BUN SERPL-MCNC: 60.4 MG/DL — HIGH (ref 8–20)
CALCIUM SERPL-MCNC: 8.2 MG/DL — LOW (ref 8.4–10.5)
CHLORIDE SERPL-SCNC: 98 MMOL/L — SIGNIFICANT CHANGE UP (ref 96–108)
CO2 SERPL-SCNC: 25 MMOL/L — SIGNIFICANT CHANGE UP (ref 22–29)
CREAT SERPL-MCNC: 6.88 MG/DL — HIGH (ref 0.5–1.3)
EGFR: 8 ML/MIN/1.73M2 — LOW
EGFR: 8 ML/MIN/1.73M2 — LOW
HCT VFR BLD CALC: 27.9 % — LOW (ref 39–50)
HGB BLD-MCNC: 8.7 G/DL — LOW (ref 13–17)
IMMATURE PLATELET FRACTION %: 3.9 % — SIGNIFICANT CHANGE UP (ref 1.6–7.1)
MCHC RBC-ENTMCNC: 31 PG — SIGNIFICANT CHANGE UP (ref 27–34)
MCHC RBC-ENTMCNC: 31.2 G/DL — LOW (ref 32–36)
MCV RBC AUTO: 99.3 FL — SIGNIFICANT CHANGE UP (ref 80–100)
NRBC # BLD AUTO: 0 K/UL — SIGNIFICANT CHANGE UP (ref 0–0)
NRBC BLD AUTO-RTO: 0 /100 WBCS — SIGNIFICANT CHANGE UP (ref 0–0)
PLATELET # BLD AUTO: 78 K/UL — LOW (ref 150–400)
PMV BLD: 11.5 FL — SIGNIFICANT CHANGE UP (ref 7–13)
POTASSIUM SERPL-SCNC: 3.9 MMOL/L — SIGNIFICANT CHANGE UP (ref 3.5–5.3)
RBC # BLD: 2.81 M/UL — LOW (ref 4.2–5.8)
RBC # FLD: 13.8 % — SIGNIFICANT CHANGE UP (ref 10.3–14.5)
SODIUM SERPL-SCNC: 137 MMOL/L — SIGNIFICANT CHANGE UP (ref 135–145)
WBC # BLD: 4.59 K/UL — SIGNIFICANT CHANGE UP (ref 3.8–10.5)
WBC # FLD AUTO: 4.59 K/UL — SIGNIFICANT CHANGE UP (ref 3.8–10.5)

## 2025-03-21 PROCEDURE — 73206 CT ANGIO UPR EXTRM W/O&W/DYE: CPT | Mod: 26,LT

## 2025-03-21 PROCEDURE — 99239 HOSP IP/OBS DSCHRG MGMT >30: CPT

## 2025-03-21 RX ADMIN — EPOETIN ALFA 10000 UNIT(S): 10000 SOLUTION INTRAVENOUS; SUBCUTANEOUS at 10:53

## 2025-03-21 RX ADMIN — Medication 0.3 MILLIGRAM(S): at 05:49

## 2025-03-21 RX ADMIN — LEVETIRACETAM 500 MILLIGRAM(S): 10 INJECTION, SOLUTION INTRAVENOUS at 05:49

## 2025-03-21 RX ADMIN — OXYCODONE HYDROCHLORIDE 2.5 MILLIGRAM(S): 30 TABLET ORAL at 09:36

## 2025-03-21 RX ADMIN — AMLODIPINE BESYLATE 10 MILLIGRAM(S): 10 TABLET ORAL at 05:49

## 2025-03-21 RX ADMIN — OXYCODONE HYDROCHLORIDE 2.5 MILLIGRAM(S): 30 TABLET ORAL at 10:06

## 2025-03-21 RX ADMIN — Medication 100 MILLIGRAM(S): at 05:48

## 2025-03-21 RX ADMIN — Medication 40 MILLIGRAM(S): at 05:49

## 2025-03-21 RX ADMIN — Medication 1 APPLICATION(S): at 06:06

## 2025-03-21 NOTE — DISCHARGE NOTE PROVIDER - NSDCFUSCHEDAPPT_GEN_ALL_CORE_FT
Prasanna Crawford  [facility] Physician [facility]  VASCULAR [address]  Scheduled Appointment: 04/15/2025    Felice Frankel  [facility] Physician [facility]  CARDIOLOGY [address]  Scheduled Appointment: 04/29/2025

## 2025-03-21 NOTE — DISCHARGE NOTE PROVIDER - CARE PROVIDERS DIRECT ADDRESSES
[email or web address]

## 2025-03-21 NOTE — DISCHARGE NOTE PROVIDER - CARE PROVIDER_API CALL
Prasanna Crawford  Vascular Surgery  [address]  [place] [address]  Phone: [phone]  Fax: [phone]  Follow Up Time: 2 weeks

## 2025-03-21 NOTE — DISCHARGE NOTE PROVIDER - NSDCMRMEDTOKEN_GEN_ALL_CORE_FT
acetaminophen 325 mg oral tablet: 2 tab(s) orally every 6 hours As needed Temp greater or equal to 38C (100.4F), Mild Pain (1 - 3)  amLODIPine 10 mg oral tablet: 1 tab(s) orally once a day  aspirin 81 mg oral tablet: orally once a day followup with your cardiology about timing of resumption of Aspirin  atorvastatin 40 mg oral tablet: 1 tab(s) orally once a day (at bedtime)  carvedilol 6.25 mg oral tablet: 1 tab(s) orally 2 times a day  cloNIDine 0.3 mg oral tablet: 1 tab(s) orally 2 times a day  hydrALAZINE 100 mg oral tablet: 1 tab(s) orally 2 times a day  isosorbide mononitrate 60 mg oral tablet, extended release: 1 tab(s) orally once a day  levETIRAcetam 500 mg oral tablet: 1 tab(s) orally 2 times a day  Lomotil 2.5 mg-0.025 mg oral tablet: 1 tab(s) orally once a day  pantoprazole 40 mg oral delayed release tablet: 1 tab(s) orally once a day (before a meal)  sevelamer carbonate 800 mg oral tablet: 2 tab(s) orally 3 times a day (with meals)  tamsulosin 0.4 mg oral capsule: 1 cap(s) orally once a day (at bedtime)  torsemide 20 mg oral tablet: 1 tab(s) orally once a day

## 2025-03-21 NOTE — DISCHARGE NOTE PROVIDER - ATTENDING DISCHARGE PHYSICAL EXAMINATION:
VITALS:   T(C): 36.2 (03-21-25 @ 13:20), Max: 36.8 (03-21-25 @ 00:00)  HR: 54 (03-21-25 @ 13:20) (53 - 76)  BP: 131/52 (03-21-25 @ 13:20) (127/52 - 163/68)  RR: 16 (03-21-25 @ 13:20) (16 - 18)  SpO2: 95% (03-21-25 @ 13:20) (92% - 100%)    CONSTITUTIONAL: NAD, well-groomed  RESPIRATORY: Normal respiratory effort; lungs are clear to auscultation bilaterally  CARDIOVASCULAR: Regular rate and rhythm, no LE edema. R chest wall permacath   ABDOMEN: Nontender to palpation, normoactive bowel sounds  EXTREMITY: LUE: + erythema and bruising noted, minimal thrill noted

## 2025-03-21 NOTE — PROGRESS NOTE ADULT - SUBJECTIVE AND OBJECTIVE BOX
NEPHROLOGY INTERVAL HPI/OVERNIGHT EVENTS:    Examined earlier    MEDICATIONS  (STANDING):  amLODIPine   Tablet 10 milliGRAM(s) Oral daily  atorvastatin 40 milliGRAM(s) Oral at bedtime  carvedilol 3.125 milliGRAM(s) Oral every 12 hours  chlorhexidine 2% Cloths 1 Application(s) Topical <User Schedule>  chlorhexidine 2% Cloths 1 Application(s) Topical <User Schedule>  cloNIDine 0.3 milliGRAM(s) Oral two times a day  epoetin yolanda-epbx (RETACRIT) Injectable 42389 Unit(s) IV Push once  hydrALAZINE 100 milliGRAM(s) Oral two times a day  isosorbide   mononitrate ER Tablet (IMDUR) 60 milliGRAM(s) Oral daily  levETIRAcetam 500 milliGRAM(s) Oral two times a day  pantoprazole    Tablet 40 milliGRAM(s) Oral before breakfast  sevelamer carbonate 1600 milliGRAM(s) Oral three times a day with meals  tamsulosin 0.4 milliGRAM(s) Oral at bedtime  torsemide 20 milliGRAM(s) Oral daily    MEDICATIONS  (PRN):  acetaminophen     Tablet .. 650 milliGRAM(s) Oral every 6 hours PRN Temp greater or equal to 38C (100.4F), Mild Pain (1 - 3)  aluminum hydroxide/magnesium hydroxide/simethicone Suspension 30 milliLiter(s) Oral every 4 hours PRN Dyspepsia  melatonin 3 milliGRAM(s) Oral at bedtime PRN Insomnia  ondansetron Injectable 4 milliGRAM(s) IV Push every 8 hours PRN Nausea and/or Vomiting  oxyCODONE    IR 2.5 milliGRAM(s) Oral four times a day PRN Moderate Pain (4 - 6) and Severe Pain (7-10)  sodium chloride 0.9% lock flush 10 milliLiter(s) IV Push every 1 hour PRN Pre/post blood products, medications, blood draw, and to maintain line patency      Allergies    penicillin (Other)    Intolerances        Vital Signs Last 24 Hrs  T(C): 36.7 (21 Mar 2025 08:15), Max: 36.8 (21 Mar 2025 00:00)  T(F): 98 (21 Mar 2025 08:15), Max: 98.3 (21 Mar 2025 00:00)  HR: 63 (21 Mar 2025 08:15) (54 - 76)  BP: 133/73 (21 Mar 2025 08:15) (127/52 - 163/68)  BP(mean): --  RR: 18 (21 Mar 2025 08:15) (17 - 18)  SpO2: 97% (21 Mar 2025 08:15) (92% - 100%)    Parameters below as of 21 Mar 2025 08:15  Patient On (Oxygen Delivery Method): room air      PHYSICAL EXAM:  GENERAL: NAD  HEAD:  NCAT  NECK: Supple, No JVD,   NERVOUS SYSTEM:  Alert & Oriented X3,   CHEST/LUNG: EAE; No rales, rhonchi, wheezing, or rubs  HEART: RRR; No murmurs, rubs, or gallops  ABDOMEN: Soft, Nontender, Nondistended; Bowel sounds present  EXTREMITIES: L arm access w swelling / hematoma . No drainage . (+) bruit    LABS:                        8.7    4.59  )-----------( 78       ( 21 Mar 2025 04:50 )             27.9     03-21    137  |  98  |  60.4[H]  ----------------------------<  92  3.9   |  25.0  |  6.88[H]    Ca    8.2[L]      21 Mar 2025 04:50  Mg     2.1     03-20        Urinalysis Basic - ( 21 Mar 2025 04:50 )    Color: x / Appearance: x / SG: x / pH: x  Gluc: 92 mg/dL / Ketone: x  / Bili: x / Urobili: x   Blood: x / Protein: x / Nitrite: x   Leuk Esterase: x / RBC: x / WBC x   Sq Epi: x / Non Sq Epi: x / Bacteria: x              RADIOLOGY & ADDITIONAL TESTS:   NEPHROLOGY INTERVAL HPI/OVERNIGHT EVENTS:    Examined on HD tolerating ok    MEDICATIONS  (STANDING):  amLODIPine   Tablet 10 milliGRAM(s) Oral daily  atorvastatin 40 milliGRAM(s) Oral at bedtime  carvedilol 3.125 milliGRAM(s) Oral every 12 hours  chlorhexidine 2% Cloths 1 Application(s) Topical <User Schedule>  chlorhexidine 2% Cloths 1 Application(s) Topical <User Schedule>  cloNIDine 0.3 milliGRAM(s) Oral two times a day  epoetin yolanda-epbx (RETACRIT) Injectable 71851 Unit(s) IV Push once  hydrALAZINE 100 milliGRAM(s) Oral two times a day  isosorbide   mononitrate ER Tablet (IMDUR) 60 milliGRAM(s) Oral daily  levETIRAcetam 500 milliGRAM(s) Oral two times a day  pantoprazole    Tablet 40 milliGRAM(s) Oral before breakfast  sevelamer carbonate 1600 milliGRAM(s) Oral three times a day with meals  tamsulosin 0.4 milliGRAM(s) Oral at bedtime  torsemide 20 milliGRAM(s) Oral daily    MEDICATIONS  (PRN):  acetaminophen     Tablet .. 650 milliGRAM(s) Oral every 6 hours PRN Temp greater or equal to 38C (100.4F), Mild Pain (1 - 3)  aluminum hydroxide/magnesium hydroxide/simethicone Suspension 30 milliLiter(s) Oral every 4 hours PRN Dyspepsia  melatonin 3 milliGRAM(s) Oral at bedtime PRN Insomnia  ondansetron Injectable 4 milliGRAM(s) IV Push every 8 hours PRN Nausea and/or Vomiting  oxyCODONE    IR 2.5 milliGRAM(s) Oral four times a day PRN Moderate Pain (4 - 6) and Severe Pain (7-10)  sodium chloride 0.9% lock flush 10 milliLiter(s) IV Push every 1 hour PRN Pre/post blood products, medications, blood draw, and to maintain line patency      Allergies    penicillin (Other)    Intolerances        Vital Signs Last 24 Hrs  T(C): 36.7 (21 Mar 2025 08:15), Max: 36.8 (21 Mar 2025 00:00)  T(F): 98 (21 Mar 2025 08:15), Max: 98.3 (21 Mar 2025 00:00)  HR: 63 (21 Mar 2025 08:15) (54 - 76)  BP: 133/73 (21 Mar 2025 08:15) (127/52 - 163/68)  BP(mean): --  RR: 18 (21 Mar 2025 08:15) (17 - 18)  SpO2: 97% (21 Mar 2025 08:15) (92% - 100%)    Parameters below as of 21 Mar 2025 08:15  Patient On (Oxygen Delivery Method): room air      PHYSICAL EXAM:  GENERAL: NAD  HEAD:  NCAT  NECK: Supple, No JVD,   NERVOUS SYSTEM:  Alert & Oriented X3,   CHEST/LUNG: EAE; No rales, rhonchi, wheezing, or rubs  HEART: RRR; No murmurs, rubs, or gallops  ABDOMEN: Soft, Nontender, Nondistended; Bowel sounds present  EXTREMITIES: L arm access w swelling / hematoma . No drainage . (+) bruit    LABS:                        8.7    4.59  )-----------( 78       ( 21 Mar 2025 04:50 )             27.9     03-21    137  |  98  |  60.4[H]  ----------------------------<  92  3.9   |  25.0  |  6.88[H]    Ca    8.2[L]      21 Mar 2025 04:50  Mg     2.1     03-20        Urinalysis Basic - ( 21 Mar 2025 04:50 )    Color: x / Appearance: x / SG: x / pH: x  Gluc: 92 mg/dL / Ketone: x  / Bili: x / Urobili: x   Blood: x / Protein: x / Nitrite: x   Leuk Esterase: x / RBC: x / WBC x   Sq Epi: x / Non Sq Epi: x / Bacteria: x              RADIOLOGY & ADDITIONAL TESTS:

## 2025-03-21 NOTE — DISCHARGE NOTE PROVIDER - HOSPITAL COURSE
Mr. Cummins is 65 y/o M w/ PMHx of ESRD (on HD tu/th/sa), Afib (not on AC 2/2 prior GIB now s/p watchman), CAD s/p CABG, former substance use disorder, Mitral valve infective endocarditis (2023), aortic and mitral valve replacement, Type A aortic dissection s/p repair c/b ischemic bowel s/p resection, seizure disorder, and recent hospital admission  for PNA (01/2025) admitted to [facility] for missed HD and ?malfunction AVF. LUE fistula access w/ faintly palpable thrill, TTP, and extensive ecchymosis, us noted with stenosis at the proximal anastomosis and small hematoma. Patent AVF. s/p IR permacath placement, and tolerated HD. Also s/p CTA of LUE without active bleeding of the hematoma. Now stable for discharge.

## 2025-03-21 NOTE — DISCHARGE NOTE PROVIDER - NSDCFUADDAPPT_GEN_ALL_CORE_FT
[facility] (P: [phone] F: [phone]) - T/Th/S at 5am, SERVICES REINSTATED.

## 2025-03-21 NOTE — PROGRESS NOTE ADULT - REASON FOR ADMISSION
Missed HD due to malfunctioning AV graft

## 2025-03-21 NOTE — DISCHARGE NOTE NURSING/CASE MANAGEMENT/SOCIAL WORK - FINANCIAL ASSISTANCE
[facility] provides services at a reduced cost to those who are determined to be eligible through [facility]’s financial assistance program. Information regarding [facility]’s financial assistance program can be found by going to [email or web address] or by calling [phone].

## 2025-03-21 NOTE — DISCHARGE NOTE NURSING/CASE MANAGEMENT/SOCIAL WORK - PATIENT PORTAL LINK FT
You can access the FollowMyHealth Patient Portal offered by [facility] by registering at the following website: [email or web address]. By joining [email or web address] FollowMyHealth portal, you will also be able to view your health information using other applications (apps) compatible with our system.

## 2025-03-21 NOTE — DISCHARGE NOTE NURSING/CASE MANAGEMENT/SOCIAL WORK - NSDCVIVACCINE_GEN_ALL_CORE_FT
influenza, injectable, quadrivalent, preservative free; 12-Dec-2020 09:28; Lynn Gerardo); Capsule Tech; 724k2 (Exp. Date: 30-Jun-2021); IntraMuscular; Deltoid Right.; 0.5 milliLiter(s); VIS (VIS Published: 15-Aug-2019, VIS Presented: 12-Dec-2020);

## 2025-03-21 NOTE — DISCHARGE NOTE NURSING/CASE MANAGEMENT/SOCIAL WORK - NSDCFUADDAPPT_GEN_ALL_CORE_FT
[facility] (P: [phone] F: [phone]) - T/Th/S at 5am, SERVICES REINSTATED.

## 2025-03-21 NOTE — PROGRESS NOTE ADULT - PROVIDER SPECIALTY LIST ADULT
Hospitalist
Left VM for pt to return call.    
Nephrology
Nephrology
Please see lab results, Chlamydia and Gonorrhea negative but see Wet mount. Please advise.  
Pt returned call and would like a call back. Please advise  
STD is negative and wet prep results discussed already with the patient in the [facility]. Thanks  
Spoke w/ pt. Relayed information below. Pt verbalized understanding.    
Vascular Surgery
Hospitalist
Nephrology
Vascular Surgery

## 2025-03-21 NOTE — PROGRESS NOTE ADULT - ASSESSMENT
63 y/o M w h/o ESRD (on HD tu/th/sa), Afib (not on AC 2/2 prior GIB now s/p watchman), CAD s/p CABG, former substance use disorder, Mitral valve infective endocarditis (2023), aortic and mitral valve replacement, Type A aortic dissection s/p repair c/b ischemic bowel s/p resection, seizure disorder, and recent hospital admission  for PNA (01/2025) admitted to [facility] for missed HD due to suspected fistula thrombosis    ESRD - Usually TTS --> HD today via permcath   Sustained infiltration to LUE arm access causing sig hematoma with tracking down left chest wall  Team here was able to use AVG , but it is going to be difficult in out-patient setting   Pt is going to need to rest arm for a period of time , permcath placed yest by IR    If stable , can  HD on Tues 3/25 in [place]     Anemia - Epogen with HD     RO - Continue binders     Will follow  65 y/o M w h/o ESRD (on HD tu/th/sa), Afib (not on AC 2/2 prior GIB now s/p watchman), CAD s/p CABG, former substance use disorder, Mitral valve infective endocarditis (2023), aortic and mitral valve replacement, Type A aortic dissection s/p repair c/b ischemic bowel s/p resection, seizure disorder, and recent hospital admission  for PNA (01/2025) admitted to [facility] for missed HD due to suspected fistula thrombosis    ESRD - Usually TTS --> HD today via permcath   Sustained infiltration to LUE arm access causing sig hematoma with tracking down left chest wall  Team here was able to use AVG , but it is going to be difficult in out-patient setting   Pt is going to need to rest arm for a period of time , permcath placed yest by IR    If stable , can  HD on Tues 3/25 in [facility]    Anemia - Epogen with HD     RO - Continue binders     Will follow

## 2025-03-24 RX ORDER — ASPIRIN 325 MG
0 TABLET ORAL
Qty: 0 | Refills: 0 | DISCHARGE
Start: 2025-03-24

## 2025-03-27 NOTE — PROGRESS NOTE ADULT - ASSESSMENT
BP has improved after decreasing bumex at his last visit on 3/11/25.  2+ edema.  Weight stable.  No lightheadedness.  Continue amlodipine 5 mg and bumex 1 mg daily.     61yo M with PMHx of CAD s/p PCI to LAD, aortic regurgitation, HTN, thoracic aortic dissection s/p emergent surgery c/b CVA with residual left-sided weakness, colostomy for bowel ischemia s/p reversal, and psoriasis on Humira, who presented to [facility] on 11/02/2020 with worsening SOB with associated chest pressure and LE edema; found to have mod-severe MR and AR, now s/p sternotomy for CABG x2, AVR, and MVR. Hospital Course complicated by SANTOS now requiring HD, acute blood loss anemia s/p multiple PRBCs, and atrial flutter. Admitted for multidisciplinary rehab program.      Comprehensive Multidisciplinary Rehab Program:- PT/OT/ST 3 hours a day 5 days a week    CAD with AR and MR, s/p sternotomy  with CABG x2, AVR, and MVR  - repeat TTE prior to discharge on 11/23 with EF 40%  - Daily INR; c/w Coumadin  - c/w ASA 81mg daily  - c/w atorvastatin 40mg qhs  - c/w Metoprolol tartrate 50mg q8h    #Bloody BM- RESOLVED  after 1 episode   - Coumadin held on 12/9-10; hgb stable  - prior w/u for anemia included EGD and pouchoscopy on 11/09/2020 at [facility] were unrevealing  - has non-bleeding hemorrhoids. No further episodes; continue to monitor for now  - GI recs outpatient colonoscopy    Cough: improved, - trial of Hycodan qhs x3 days; improved on Hycodan for now    Anemia:- s/p 1 units PRBC on 12/4 with HD  - continue to monitor CBC with HD    Atrial flutter:- c/w Coumadin and Metoprolol  - c/w Amiodarone 200mg daily  - Coumadin qhs- HOLD TODAY . restart at 1 mg daily     ATN:  - s/p Permcath by IR on 11/20  - renal consult; HD MWF  - Retacrit with HD  Patient has an appointment for HD on Thursday am     History of seizures:- c/w Keppra 750mg BID    HLD:- c/w Atorvastatin    Mood/sleep: sleep improved with trazodone.   NP eval noted     Pain:- Tylenol PRN   - c/w gabapentin 100mg qhs    GI/Bowel:  - Senna 2 tabs daily  - GI ppx: Protonix 40mg daily    /Bladder: Toileting schedule prn    #Diet: Regular, DASH/TLC, renal    - Nutrition to follow      DVT prophylaxis:  - Coumadin    Labs with leucocytosis: Improved    Hospitalist consult noted    Disp:   home today with home care  fu with PCP in 1 week  home labs for INR Monday/Thursday -   requested one time draw in am by home care  HD scheduled for thursday   All questions answered   stable for dc home

## 2025-04-11 ENCOUNTER — APPOINTMENT (OUTPATIENT)
Dept: CARDIOLOGY | Facility: CLINIC | Age: 65
End: 2025-04-11

## 2025-04-11 PROCEDURE — 93242 EXT ECG>48HR<7D RECORDING: CPT

## 2025-04-13 ENCOUNTER — INPATIENT (INPATIENT)
Facility: HOSPITAL | Age: 65
LOS: 2 days | Discharge: HOME | DRG: 310 | End: 2025-04-16
Attending: STUDENT IN AN ORGANIZED HEALTH CARE EDUCATION/TRAINING PROGRAM | Admitting: STUDENT IN AN ORGANIZED HEALTH CARE EDUCATION/TRAINING PROGRAM
Payer: MEDICARE

## 2025-04-13 VITALS
DIASTOLIC BLOOD PRESSURE: 50 MMHG | WEIGHT: 142.86 LBS | HEIGHT: 66 IN | OXYGEN SATURATION: 100 % | RESPIRATION RATE: 18 BRPM | HEART RATE: 32 BPM | TEMPERATURE: 97 F | SYSTOLIC BLOOD PRESSURE: 146 MMHG

## 2025-04-13 DIAGNOSIS — I77.0 ARTERIOVENOUS FISTULA, ACQUIRED: Chronic | ICD-10-CM

## 2025-04-13 DIAGNOSIS — R00.1 BRADYCARDIA, UNSPECIFIED: ICD-10-CM

## 2025-04-13 DIAGNOSIS — Z94.0 KIDNEY TRANSPLANT STATUS: Chronic | ICD-10-CM

## 2025-04-13 DIAGNOSIS — Z95.1 PRESENCE OF AORTOCORONARY BYPASS GRAFT: Chronic | ICD-10-CM

## 2025-04-13 DIAGNOSIS — Z90.49 ACQUIRED ABSENCE OF OTHER SPECIFIED PARTS OF DIGESTIVE TRACT: Chronic | ICD-10-CM

## 2025-04-13 DIAGNOSIS — Z95.818 PRESENCE OF OTHER CARDIAC IMPLANTS AND GRAFTS: Chronic | ICD-10-CM

## 2025-04-13 DIAGNOSIS — Z95.2 PRESENCE OF PROSTHETIC HEART VALVE: Chronic | ICD-10-CM

## 2025-04-13 DIAGNOSIS — Z86.79 PERSONAL HISTORY OF OTHER DISEASES OF THE CIRCULATORY SYSTEM: Chronic | ICD-10-CM

## 2025-04-13 LAB
ALBUMIN SERPL ELPH-MCNC: 4.2 G/DL — SIGNIFICANT CHANGE UP (ref 3.3–5.2)
ALP SERPL-CCNC: 96 U/L — SIGNIFICANT CHANGE UP (ref 40–120)
ALT FLD-CCNC: 12 U/L — SIGNIFICANT CHANGE UP
ANION GAP SERPL CALC-SCNC: 20 MMOL/L — HIGH (ref 5–17)
AST SERPL-CCNC: 16 U/L — SIGNIFICANT CHANGE UP
BASOPHILS # BLD AUTO: 0.05 K/UL — SIGNIFICANT CHANGE UP (ref 0–0.2)
BASOPHILS NFR BLD AUTO: 1.1 % — SIGNIFICANT CHANGE UP (ref 0–2)
BILIRUB SERPL-MCNC: 0.8 MG/DL — SIGNIFICANT CHANGE UP (ref 0.4–2)
BUN SERPL-MCNC: 101.4 MG/DL — HIGH (ref 8–20)
CALCIUM SERPL-MCNC: 8.9 MG/DL — SIGNIFICANT CHANGE UP (ref 8.4–10.5)
CHLORIDE SERPL-SCNC: 102 MMOL/L — SIGNIFICANT CHANGE UP (ref 96–108)
CO2 SERPL-SCNC: 19 MMOL/L — LOW (ref 22–29)
CREAT SERPL-MCNC: 7.9 MG/DL — HIGH (ref 0.5–1.3)
EGFR: 7 ML/MIN/1.73M2 — LOW
EGFR: 7 ML/MIN/1.73M2 — LOW
EOSINOPHIL # BLD AUTO: 0.26 K/UL — SIGNIFICANT CHANGE UP (ref 0–0.5)
EOSINOPHIL NFR BLD AUTO: 5.9 % — SIGNIFICANT CHANGE UP (ref 0–6)
GAS PNL BLDV: SIGNIFICANT CHANGE UP
GLUCOSE SERPL-MCNC: 88 MG/DL — SIGNIFICANT CHANGE UP (ref 70–99)
HCT VFR BLD CALC: 33.6 % — LOW (ref 39–50)
HGB BLD-MCNC: 10.4 G/DL — LOW (ref 13–17)
HIV 1 & 2 AB SERPL IA.RAPID: SIGNIFICANT CHANGE UP
IMM GRANULOCYTES # BLD AUTO: 0.01 K/UL — SIGNIFICANT CHANGE UP (ref 0–0.07)
IMM GRANULOCYTES NFR BLD AUTO: 0.2 % — SIGNIFICANT CHANGE UP (ref 0–0.9)
LIDOCAIN IGE QN: 66 U/L — HIGH (ref 22–51)
LYMPHOCYTES # BLD AUTO: 1.25 K/UL — SIGNIFICANT CHANGE UP (ref 1–3.3)
LYMPHOCYTES NFR BLD AUTO: 28.3 % — SIGNIFICANT CHANGE UP (ref 13–44)
MAGNESIUM SERPL-MCNC: 2 MG/DL — SIGNIFICANT CHANGE UP (ref 1.6–2.6)
MCHC RBC-ENTMCNC: 30.9 PG — SIGNIFICANT CHANGE UP (ref 27–34)
MCHC RBC-ENTMCNC: 31 G/DL — LOW (ref 32–36)
MCV RBC AUTO: 99.7 FL — SIGNIFICANT CHANGE UP (ref 80–100)
MONOCYTES # BLD AUTO: 0.33 K/UL — SIGNIFICANT CHANGE UP (ref 0–0.9)
MONOCYTES NFR BLD AUTO: 7.5 % — SIGNIFICANT CHANGE UP (ref 2–14)
NEUTROPHILS # BLD AUTO: 2.52 K/UL — SIGNIFICANT CHANGE UP (ref 1.8–7.4)
NEUTROPHILS NFR BLD AUTO: 57 % — SIGNIFICANT CHANGE UP (ref 43–77)
NRBC # BLD AUTO: 0 K/UL — SIGNIFICANT CHANGE UP (ref 0–0)
NRBC # FLD: 0 K/UL — SIGNIFICANT CHANGE UP (ref 0–0)
NRBC BLD AUTO-RTO: 0 /100 WBCS — SIGNIFICANT CHANGE UP (ref 0–0)
PHOSPHATE SERPL-MCNC: 7.5 MG/DL — HIGH (ref 2.4–4.7)
PLATELET # BLD AUTO: 105 K/UL — LOW (ref 150–400)
PMV BLD: 11.4 FL — SIGNIFICANT CHANGE UP (ref 7–13)
POTASSIUM SERPL-MCNC: 4.7 MMOL/L — SIGNIFICANT CHANGE UP (ref 3.5–5.3)
POTASSIUM SERPL-SCNC: 4.7 MMOL/L — SIGNIFICANT CHANGE UP (ref 3.5–5.3)
PROT SERPL-MCNC: 7.1 G/DL — SIGNIFICANT CHANGE UP (ref 6.6–8.7)
RBC # BLD: 3.37 M/UL — LOW (ref 4.2–5.8)
RBC # FLD: 14.2 % — SIGNIFICANT CHANGE UP (ref 10.3–14.5)
SODIUM SERPL-SCNC: 141 MMOL/L — SIGNIFICANT CHANGE UP (ref 135–145)
T3 SERPL-MCNC: 98 NG/DL — SIGNIFICANT CHANGE UP (ref 80–200)
T4 AB SER-ACNC: 6.4 UG/DL — SIGNIFICANT CHANGE UP (ref 4.5–12)
TROPONIN T, HIGH SENSITIVITY RESULT: 127 NG/L — HIGH (ref 0–51)
TSH SERPL-MCNC: 11.63 UIU/ML — HIGH (ref 0.27–4.2)
WBC # BLD: 4.42 K/UL — SIGNIFICANT CHANGE UP (ref 3.8–10.5)
WBC # FLD AUTO: 4.42 K/UL — SIGNIFICANT CHANGE UP (ref 3.8–10.5)

## 2025-04-13 PROCEDURE — 71045 X-RAY EXAM CHEST 1 VIEW: CPT | Mod: 26

## 2025-04-13 PROCEDURE — 99291 CRITICAL CARE FIRST HOUR: CPT

## 2025-04-13 RX ORDER — CEFTRIAXONE 500 MG/1
2000 INJECTION, POWDER, FOR SOLUTION INTRAMUSCULAR; INTRAVENOUS ONCE
Refills: 0 | Status: DISCONTINUED | OUTPATIENT
Start: 2025-04-13 | End: 2025-04-13

## 2025-04-13 RX ORDER — HEPARIN SODIUM 1000 [USP'U]/ML
5000 INJECTION INTRAVENOUS; SUBCUTANEOUS EVERY 12 HOURS
Refills: 0 | Status: DISCONTINUED | OUTPATIENT
Start: 2025-04-13 | End: 2025-04-15

## 2025-04-13 RX ORDER — DIPHENOXYLATE HYDROCHLORIDE AND ATROPINE SULFATE .025; 2.5 MG/1; MG/1
1 TABLET ORAL EVERY 6 HOURS
Refills: 0 | Status: DISCONTINUED | OUTPATIENT
Start: 2025-04-13 | End: 2025-04-16

## 2025-04-13 RX ORDER — CALCIUM GLUCONATE 20 MG/ML
2 INJECTION, SOLUTION INTRAVENOUS ONCE
Refills: 0 | Status: COMPLETED | OUTPATIENT
Start: 2025-04-13 | End: 2025-04-13

## 2025-04-13 RX ORDER — TAMSULOSIN HYDROCHLORIDE 0.4 MG/1
0.4 CAPSULE ORAL AT BEDTIME
Refills: 0 | Status: DISCONTINUED | OUTPATIENT
Start: 2025-04-13 | End: 2025-04-16

## 2025-04-13 RX ORDER — CEFTRIAXONE 500 MG/1
2000 INJECTION, POWDER, FOR SOLUTION INTRAMUSCULAR; INTRAVENOUS ONCE
Refills: 0 | Status: COMPLETED | OUTPATIENT
Start: 2025-04-13 | End: 2025-04-13

## 2025-04-13 RX ORDER — VANCOMYCIN HCL IN 5 % DEXTROSE 1.5G/250ML
1000 PLASTIC BAG, INJECTION (ML) INTRAVENOUS ONCE
Refills: 0 | Status: COMPLETED | OUTPATIENT
Start: 2025-04-13 | End: 2025-04-13

## 2025-04-13 RX ADMIN — CALCIUM GLUCONATE 200 GRAM(S): 20 INJECTION, SOLUTION INTRAVENOUS at 19:55

## 2025-04-13 RX ADMIN — Medication 1 MILLIGRAM(S): at 19:55

## 2025-04-13 RX ADMIN — CEFTRIAXONE 2000 MILLIGRAM(S): 500 INJECTION, POWDER, FOR SOLUTION INTRAMUSCULAR; INTRAVENOUS at 22:29

## 2025-04-13 RX ADMIN — Medication 1 MILLIGRAM(S): at 22:46

## 2025-04-13 RX ADMIN — Medication 250 MILLIGRAM(S): at 22:29

## 2025-04-13 NOTE — ED PROVIDER NOTE - PHYSICAL EXAMINATION
General: NAD  HEENT: Normocephalic, atraumatic  Neck: No apparent stiffness or JVD  Pulm: Chest wall symmetric and nontender, lungs clear to ascultation   Cardiac: slow rate and regular rhythm  Abdomen: Nontender and nondistended  Skin: Skin is warm, dry and intact without rashes or lesions.  Neuro: No motor or sensory deficits above reported baseline  MSK: No deformity or tenderness above reported baseline

## 2025-04-13 NOTE — ED ADULT TRIAGE NOTE - CHIEF COMPLAINT QUOTE
states he has not has dialysis since tuesday. normal schedule  is tuesday/thursday/saturday. c/o increasing weakness. states they did not do dialysis on thursday or saturday d/t low heart rate. pt bradycardic in triage, HX afib.

## 2025-04-13 NOTE — ED PROVIDER NOTE - OBJECTIVE STATEMENT
Pt is a 65 y/o Male w/ PMHx of ESRD HD TThS, Afib (not on AC 2/2 prior GIB now s/p watchman), CAD s/p CABG, former substance use disorder, Mitral valve infective endocarditis (2023), aortic and mitral valve replacement, Type A aortic dissection s/p repair c/b ischemic bowel s/p resection, seizure disorder, and recent hospital admission  for PNA (01/2025) presenting with asymptomatic bradycardia. Patient states when he went to dialysis this past Thursday and Saturday they refused to dialyze him due to his heart rate being too low. pt denies fever, abdominal pain, N/V/D, chest pain, SOB, mentating fine.

## 2025-04-13 NOTE — ED ADULT NURSE REASSESSMENT NOTE - NS ED NURSE REASSESS COMMENT FT1
pt found taking home medications. medication removed from bedside. confirmed with pharmacy medications are carvedilol, diphenoxildate, clonidine. provider, and charge, made aware.

## 2025-04-13 NOTE — ED PROVIDER NOTE - CLINICAL SUMMARY MEDICAL DECISION MAKING FREE TEXT BOX
Pt is a 63 y/o Male w/ PMHx of ESRD HD TThS, Afib (not on AC 2/2 prior GIB now s/p watchman), CAD s/p CABG, former substance use disorder, Mitral valve infective endocarditis (2023), aortic and mitral valve replacement, Type A aortic dissection s/p repair c/b ischemic bowel s/p resection, seizure disorder, and recent hospital admission  for PNA (01/2025) presenting with asymptomatic bradycardia. Patient  was not able to get two sessions of dialysis due to bradycardia.    vs shows normotensive pt, bradycardic to 30s. given atropine and calcium which briefly improved heart rate. on reassessment, pt is hypertensive, liekly due to volume overload due to missing HD.     acs workup, consulted micu, recommending blood cultures and empiric coverage for endocarditis given bradycardia. given vanc zosyn. Pt is a 65 y/o Male w/ PMHx of ESRD HD TThS, Afib (not on AC 2/2 prior GIB now s/p watchman), CAD s/p CABG, former substance use disorder, Mitral valve infective endocarditis (2023), aortic and mitral valve replacement, Type A aortic dissection s/p repair c/b ischemic bowel s/p resection, seizure disorder, and recent hospital admission  for PNA (01/2025) presenting with asymptomatic bradycardia. Patient  was not able to get two sessions of dialysis due to bradycardia.    vs shows normotensive pt, bradycardic to 30s. given atropine and calcium which briefly improved heart rate. on reassessment, pt is hypertensive, liekly due to volume overload due to missing HD.     acs workup, consulted micu, recommending blood cultures and empiric coverage for endocarditis given bradycardia. given vanc zosyn.    Patient continues to rest comfortably bradycardia and hypertension. ICU team evaluated and will the patient to Dr. Jeffery's service.

## 2025-04-14 ENCOUNTER — RESULT REVIEW (OUTPATIENT)
Age: 65
End: 2025-04-14

## 2025-04-14 DIAGNOSIS — R00.1 BRADYCARDIA, UNSPECIFIED: ICD-10-CM

## 2025-04-14 DIAGNOSIS — I25.10 ATHEROSCLEROTIC HEART DISEASE OF NATIVE CORONARY ARTERY WITHOUT ANGINA PECTORIS: ICD-10-CM

## 2025-04-14 DIAGNOSIS — I48.91 UNSPECIFIED ATRIAL FIBRILLATION: ICD-10-CM

## 2025-04-14 LAB
ALBUMIN SERPL ELPH-MCNC: 3.9 G/DL — SIGNIFICANT CHANGE UP (ref 3.3–5.2)
ALBUMIN SERPL ELPH-MCNC: 4 G/DL — SIGNIFICANT CHANGE UP (ref 3.3–5.2)
ALLERGY+IMMUNOLOGY DIAG STUDY NOTE: SIGNIFICANT CHANGE UP
ALP SERPL-CCNC: 90 U/L — SIGNIFICANT CHANGE UP (ref 40–120)
ALP SERPL-CCNC: 93 U/L — SIGNIFICANT CHANGE UP (ref 40–120)
ALT FLD-CCNC: 10 U/L — SIGNIFICANT CHANGE UP
ALT FLD-CCNC: 11 U/L — SIGNIFICANT CHANGE UP
ANION GAP SERPL CALC-SCNC: 19 MMOL/L — HIGH (ref 5–17)
ANION GAP SERPL CALC-SCNC: 19 MMOL/L — HIGH (ref 5–17)
AST SERPL-CCNC: 14 U/L — SIGNIFICANT CHANGE UP
AST SERPL-CCNC: 15 U/L — SIGNIFICANT CHANGE UP
BASOPHILS # BLD AUTO: 0.03 K/UL — SIGNIFICANT CHANGE UP (ref 0–0.2)
BASOPHILS # BLD AUTO: 0.05 K/UL — SIGNIFICANT CHANGE UP (ref 0–0.2)
BASOPHILS NFR BLD AUTO: 0.6 % — SIGNIFICANT CHANGE UP (ref 0–2)
BASOPHILS NFR BLD AUTO: 1.3 % — SIGNIFICANT CHANGE UP (ref 0–2)
BILIRUB SERPL-MCNC: 0.8 MG/DL — SIGNIFICANT CHANGE UP (ref 0.4–2)
BILIRUB SERPL-MCNC: 1.5 MG/DL — SIGNIFICANT CHANGE UP (ref 0.4–2)
BLD GP AB SCN SERPL QL: SIGNIFICANT CHANGE UP
BUN SERPL-MCNC: 104.9 MG/DL — HIGH (ref 8–20)
BUN SERPL-MCNC: 42.6 MG/DL — HIGH (ref 8–20)
CALCIUM SERPL-MCNC: 9 MG/DL — SIGNIFICANT CHANGE UP (ref 8.4–10.5)
CALCIUM SERPL-MCNC: 9.1 MG/DL — SIGNIFICANT CHANGE UP (ref 8.4–10.5)
CHLORIDE SERPL-SCNC: 100 MMOL/L — SIGNIFICANT CHANGE UP (ref 96–108)
CHLORIDE SERPL-SCNC: 101 MMOL/L — SIGNIFICANT CHANGE UP (ref 96–108)
CO2 SERPL-SCNC: 20 MMOL/L — LOW (ref 22–29)
CO2 SERPL-SCNC: 22 MMOL/L — SIGNIFICANT CHANGE UP (ref 22–29)
CREAT SERPL-MCNC: 3.94 MG/DL — HIGH (ref 0.5–1.3)
CREAT SERPL-MCNC: 8.17 MG/DL — HIGH (ref 0.5–1.3)
DIR ANTIGLOB POLYSPECIFIC INTERPRETATION: SIGNIFICANT CHANGE UP
EGFR: 16 ML/MIN/1.73M2 — LOW
EGFR: 16 ML/MIN/1.73M2 — LOW
EGFR: 7 ML/MIN/1.73M2 — LOW
EGFR: 7 ML/MIN/1.73M2 — LOW
EOSINOPHIL # BLD AUTO: 0.26 K/UL — SIGNIFICANT CHANGE UP (ref 0–0.5)
EOSINOPHIL # BLD AUTO: 0.28 K/UL — SIGNIFICANT CHANGE UP (ref 0–0.5)
EOSINOPHIL NFR BLD AUTO: 6 % — SIGNIFICANT CHANGE UP (ref 0–6)
EOSINOPHIL NFR BLD AUTO: 6.9 % — HIGH (ref 0–6)
GAS PNL BLDV: SIGNIFICANT CHANGE UP
GLUCOSE SERPL-MCNC: 77 MG/DL — SIGNIFICANT CHANGE UP (ref 70–99)
GLUCOSE SERPL-MCNC: 82 MG/DL — SIGNIFICANT CHANGE UP (ref 70–99)
HCT VFR BLD CALC: 31 % — LOW (ref 39–50)
HCT VFR BLD CALC: 31.6 % — LOW (ref 39–50)
HGB BLD-MCNC: 10 G/DL — LOW (ref 13–17)
HGB BLD-MCNC: 9.7 G/DL — LOW (ref 13–17)
IMM GRANULOCYTES # BLD AUTO: 0 K/UL — SIGNIFICANT CHANGE UP (ref 0–0.07)
IMM GRANULOCYTES # BLD AUTO: 0.01 K/UL — SIGNIFICANT CHANGE UP (ref 0–0.07)
IMM GRANULOCYTES NFR BLD AUTO: 0 % — SIGNIFICANT CHANGE UP (ref 0–0.9)
IMM GRANULOCYTES NFR BLD AUTO: 0.3 % — SIGNIFICANT CHANGE UP (ref 0–0.9)
IMMATURE PLATELET FRACTION #: 2.6 K/UL — LOW (ref 3.9–12.5)
IMMATURE PLATELET FRACTION #: 3 K/UL — LOW (ref 3.9–12.5)
IMMATURE PLATELET FRACTION %: 2.9 % — SIGNIFICANT CHANGE UP (ref 1.6–7.1)
IMMATURE PLATELET FRACTION %: 3.9 % — SIGNIFICANT CHANGE UP (ref 1.6–7.1)
LYMPHOCYTES # BLD AUTO: 0.95 K/UL — LOW (ref 1–3.3)
LYMPHOCYTES # BLD AUTO: 1.18 K/UL — SIGNIFICANT CHANGE UP (ref 1–3.3)
LYMPHOCYTES NFR BLD AUTO: 20.4 % — SIGNIFICANT CHANGE UP (ref 13–44)
LYMPHOCYTES NFR BLD AUTO: 31.3 % — SIGNIFICANT CHANGE UP (ref 13–44)
MAGNESIUM SERPL-MCNC: 1.8 MG/DL — SIGNIFICANT CHANGE UP (ref 1.6–2.6)
MAGNESIUM SERPL-MCNC: 2.2 MG/DL — SIGNIFICANT CHANGE UP (ref 1.6–2.6)
MCHC RBC-ENTMCNC: 30.5 PG — SIGNIFICANT CHANGE UP (ref 27–34)
MCHC RBC-ENTMCNC: 30.7 G/DL — LOW (ref 32–36)
MCHC RBC-ENTMCNC: 30.7 PG — SIGNIFICANT CHANGE UP (ref 27–34)
MCHC RBC-ENTMCNC: 32.3 G/DL — SIGNIFICANT CHANGE UP (ref 32–36)
MCV RBC AUTO: 95.1 FL — SIGNIFICANT CHANGE UP (ref 80–100)
MCV RBC AUTO: 99.4 FL — SIGNIFICANT CHANGE UP (ref 80–100)
MONOCYTES # BLD AUTO: 0.3 K/UL — SIGNIFICANT CHANGE UP (ref 0–0.9)
MONOCYTES # BLD AUTO: 0.37 K/UL — SIGNIFICANT CHANGE UP (ref 0–0.9)
MONOCYTES NFR BLD AUTO: 7.9 % — SIGNIFICANT CHANGE UP (ref 2–14)
MONOCYTES NFR BLD AUTO: 8 % — SIGNIFICANT CHANGE UP (ref 2–14)
MRSA PCR RESULT.: SIGNIFICANT CHANGE UP
NEUTROPHILS # BLD AUTO: 1.97 K/UL — SIGNIFICANT CHANGE UP (ref 1.8–7.4)
NEUTROPHILS # BLD AUTO: 3.03 K/UL — SIGNIFICANT CHANGE UP (ref 1.8–7.4)
NEUTROPHILS NFR BLD AUTO: 52.2 % — SIGNIFICANT CHANGE UP (ref 43–77)
NEUTROPHILS NFR BLD AUTO: 65.1 % — SIGNIFICANT CHANGE UP (ref 43–77)
NRBC # BLD AUTO: 0 K/UL — SIGNIFICANT CHANGE UP (ref 0–0)
NRBC # BLD AUTO: 0 K/UL — SIGNIFICANT CHANGE UP (ref 0–0)
NRBC # FLD: 0 K/UL — SIGNIFICANT CHANGE UP (ref 0–0)
NRBC # FLD: 0 K/UL — SIGNIFICANT CHANGE UP (ref 0–0)
NRBC BLD AUTO-RTO: 0 /100 WBCS — SIGNIFICANT CHANGE UP (ref 0–0)
NRBC BLD AUTO-RTO: 0 /100 WBCS — SIGNIFICANT CHANGE UP (ref 0–0)
PHOSPHATE SERPL-MCNC: 3.4 MG/DL — SIGNIFICANT CHANGE UP (ref 2.4–4.7)
PHOSPHATE SERPL-MCNC: 7.8 MG/DL — HIGH (ref 2.4–4.7)
PLATELET # BLD AUTO: 76 K/UL — LOW (ref 150–400)
PLATELET # BLD AUTO: 88 K/UL — LOW (ref 150–400)
PMV BLD: 11 FL — SIGNIFICANT CHANGE UP (ref 7–13)
PMV BLD: 11.8 FL — SIGNIFICANT CHANGE UP (ref 7–13)
POTASSIUM SERPL-MCNC: 3.5 MMOL/L — SIGNIFICANT CHANGE UP (ref 3.5–5.3)
POTASSIUM SERPL-MCNC: 5.1 MMOL/L — SIGNIFICANT CHANGE UP (ref 3.5–5.3)
POTASSIUM SERPL-SCNC: 3.5 MMOL/L — SIGNIFICANT CHANGE UP (ref 3.5–5.3)
POTASSIUM SERPL-SCNC: 5.1 MMOL/L — SIGNIFICANT CHANGE UP (ref 3.5–5.3)
PROT SERPL-MCNC: 6.7 G/DL — SIGNIFICANT CHANGE UP (ref 6.6–8.7)
PROT SERPL-MCNC: 6.9 G/DL — SIGNIFICANT CHANGE UP (ref 6.6–8.7)
RBC # BLD: 3.18 M/UL — LOW (ref 4.2–5.8)
RBC # BLD: 3.26 M/UL — LOW (ref 4.2–5.8)
RBC # FLD: 13.9 % — SIGNIFICANT CHANGE UP (ref 10.3–14.5)
RBC # FLD: 14 % — SIGNIFICANT CHANGE UP (ref 10.3–14.5)
S AUREUS DNA NOSE QL NAA+PROBE: SIGNIFICANT CHANGE UP
SODIUM SERPL-SCNC: 140 MMOL/L — SIGNIFICANT CHANGE UP (ref 135–145)
SODIUM SERPL-SCNC: 140 MMOL/L — SIGNIFICANT CHANGE UP (ref 135–145)
TROPONIN T, HIGH SENSITIVITY RESULT: 119 NG/L — HIGH (ref 0–51)
WBC # BLD: 3.77 K/UL — LOW (ref 3.8–10.5)
WBC # BLD: 4.66 K/UL — SIGNIFICANT CHANGE UP (ref 3.8–10.5)
WBC # FLD AUTO: 3.77 K/UL — LOW (ref 3.8–10.5)
WBC # FLD AUTO: 4.66 K/UL — SIGNIFICANT CHANGE UP (ref 3.8–10.5)

## 2025-04-14 PROCEDURE — 99291 CRITICAL CARE FIRST HOUR: CPT

## 2025-04-14 PROCEDURE — 93306 TTE W/DOPPLER COMPLETE: CPT | Mod: 26

## 2025-04-14 PROCEDURE — 99223 1ST HOSP IP/OBS HIGH 75: CPT | Mod: GC

## 2025-04-14 PROCEDURE — 86077 PHYS BLOOD BANK SERV XMATCH: CPT

## 2025-04-14 PROCEDURE — 93010 ELECTROCARDIOGRAM REPORT: CPT

## 2025-04-14 PROCEDURE — 93990 DOPPLER FLOW TESTING: CPT | Mod: 26

## 2025-04-14 RX ORDER — ONDANSETRON HCL/PF 4 MG/2 ML
4 VIAL (ML) INJECTION ONCE
Refills: 0 | Status: COMPLETED | OUTPATIENT
Start: 2025-04-14 | End: 2025-04-14

## 2025-04-14 RX ADMIN — HEPARIN SODIUM 5000 UNIT(S): 1000 INJECTION INTRAVENOUS; SUBCUTANEOUS at 06:18

## 2025-04-14 RX ADMIN — TAMSULOSIN HYDROCHLORIDE 0.4 MILLIGRAM(S): 0.4 CAPSULE ORAL at 23:03

## 2025-04-14 RX ADMIN — HEPARIN SODIUM 5000 UNIT(S): 1000 INJECTION INTRAVENOUS; SUBCUTANEOUS at 18:51

## 2025-04-14 RX ADMIN — Medication 1 APPLICATION(S): at 06:00

## 2025-04-14 RX ADMIN — Medication 4 MILLIGRAM(S): at 13:30

## 2025-04-14 RX ADMIN — Medication 10 MILLIGRAM(S): at 18:52

## 2025-04-14 RX ADMIN — DIPHENOXYLATE HYDROCHLORIDE AND ATROPINE SULFATE 1 TABLET(S): .025; 2.5 TABLET ORAL at 18:52

## 2025-04-14 NOTE — H&P ADULT - NS ATTEND AMEND GEN_ALL_CORE FT
.    64M PMH ESRD on HD, AF not on A/C 2/2 GIB s/p Watchman, CAD s/p CABG, prior mitral valve IE (2023), s/p aortic and mitral valve replacements, type A aortic dissection s/p repair c/b ischemic bowel, seizure d/o who presented 4/13/25 with dizziness, lightheadedness, missed 2 HD sessions given ongoing bradycardia. In the ED his HR was 30s however BP hypertensive. He takes Lomotil for diarrhea and also Clonidine for HTN, 0.3mg BID. His serum potassium was 4.7, .4, bicarb 19. EKG shows sinus bradycardia. Unclear etiology; unlikely electrolyte abnormality as potassium level was WNL; perhaps underlying sinus node disease. Will check TTE, blood cultures especially given h/o prior IE. Hold niki blocking agents. Monitor rhythm on telemetry. Would benefit from monitored dialysis in the morning. Serial chemistry panel. Cardiology evaluation in AM. Admit to ICU for hemodynamic monitoring and need for monitored dialysis.       Celso Jeffery MD, [facility]  , Pulmonary & Critical Care Medicine  [facility]  Pulmonary and Sleep Medicine at [place]  [address]  [place], [place] [address]  T: [phone]  F: [phone]

## 2025-04-14 NOTE — CONSULT NOTE ADULT - CONSULT REASON
hD access eval
ESRD
Junctional bradycardia
bradycardia
minimum assist (75% patients effort)/contact guard

## 2025-04-14 NOTE — ED ADULT NURSE NOTE - OBJECTIVE STATEMENT
Pt is a 65yo male who presents to the ED with complaints of missed dialysis / bradycardia. Pt reports pmh of ESRD and recieves HD via left AV fistula, pt states he has not has dialysis since tuesday. normal schedule  is tuesday/thursday/saturday. c/o increasing weakness. states they did not do dialysis on thursday or saturday d/t low heart rate. Upon assessment, PT is alert and oriented, with a patent and self maintained airway, with non labored breathing. PT denies CP, SOB, HA, N/V/D, Fevers or chills.

## 2025-04-14 NOTE — CONSULT NOTE ADULT - ASSESSMENT
65 y/o male with PMH of ESRD HD T,Th,S, Afib (not on AC 2/2 prior GIB now s/p watchman), CAD s/p CABG, former substance use disorder, Mitral valve infective endocarditis (2023), aortic and mitral valve replacement, Type A aortic dissection s/p repair c/b ischemic bowel s/p resection, seizure disorder, and recent hospital admission  for PNA (01/2025) presenting with asymptomatic bradycardia.    ESRD on HD TTS schedule   Was refused HD on Thurs and Sat due to Bradycardia  Will arrange HD today w UF as tolerated   Still has UOP    Suresh cardia  Holding  AVN blocking agents  Was on Carvedilol now stopped  Noted Cards plans for PPM peterson if no improvement    Will follow  63 y/o male with PMH of ESRD HD T,Th,S, Afib (not on AC 2/2 prior GIB now s/p watchman), CAD s/p CABG, former substance use disorder, Mitral valve infective endocarditis (2023), aortic and mitral valve replacement, Type A aortic dissection s/p repair c/b ischemic bowel s/p resection, seizure disorder, and recent hospital admission  for PNA (01/2025) presenting with asymptomatic bradycardia.    ESRD on HD TTS schedule   Was refused HD on Thurs and Sat due to Bradycardia  Will arrange HD today w UF as tolerated   Still has UOP    Has LUE AVF w thrill not using? wll use permcath for now  suppose to have Vascular f/u this week will call while admitted     Suresh cardia  Holding  AVN blocking agents  Was on Carvedilol now stopped  Noted Cards plans for PPM peterson if no improvement    Will follow

## 2025-04-14 NOTE — CONSULT NOTE ADULT - PROBLEM SELECTOR RECOMMENDATION 9
-pt received Atropine 1 mg with transient improvement in HR  -hold Coreg, avoid any AV blocking agents  -pt denies symptoms, no indication for pacing at this time  -EP consult in am  -sepsis workup  -TTE

## 2025-04-14 NOTE — CHART NOTE - NSCHARTNOTEFT_GEN_A_CORE
Patient is a 64y old  Male who presents with a chief complaint of Bradycardia (14 Apr 2025 09:47)      INTERVAL HPI/OVERNIGHT EVENTS:   No overnight events   Patient HR in 40s does not have any complaints at this time feels well.   Afebrile, hemodynamically stable     ICU Vital Signs Last 24 Hrs  T(C): 36.1 (14 Apr 2025 08:00), Max: 36.7 (13 Apr 2025 22:00)  T(F): 97 (14 Apr 2025 08:00), Max: 98 (13 Apr 2025 22:00)  HR: 65 (14 Apr 2025 08:00) (30 - 65)  BP: 160/57 (14 Apr 2025 08:00) (142/45 - 194/66)  BP(mean): 89 (14 Apr 2025 08:00) (71 - 105)  ABP: --  ABP(mean): --  RR: 15 (14 Apr 2025 08:00) (10 - 18)  SpO2: 97% (14 Apr 2025 08:00) (92% - 100%)    O2 Parameters below as of 14 Apr 2025 08:00  Patient On (Oxygen Delivery Method): room air          I&O's Summary        LABS:                        9.7    3.77  )-----------( 88       ( 14 Apr 2025 03:12 )             31.6     04-14    140  |  101  |  104.9[H]  ----------------------------<  82  5.1   |  20.0[L]  |  8.17[H]    Ca    9.1      14 Apr 2025 03:12  Phos  7.8     04-14  Mg     2.2     04-14    TPro  6.7  /  Alb  3.9  /  TBili  0.8  /  DBili  x   /  AST  14  /  ALT  11  /  AlkPhos  90  04-14      Urinalysis Basic - ( 14 Apr 2025 03:12 )    Color: x / Appearance: x / SG: x / pH: x  Gluc: 82 mg/dL / Ketone: x  / Bili: x / Urobili: x   Blood: x / Protein: x / Nitrite: x   Leuk Esterase: x / RBC: x / WBC x   Sq Epi: x / Non Sq Epi: x / Bacteria: x                  RADIOLOGY & ADDITIONAL TESTS:    Consultant(s) Notes Reviewed:  [x ] YES  [ ] NO    MEDICATIONS  (STANDING):  chlorhexidine 2% Cloths 1 Application(s) Topical <User Schedule>  heparin   Injectable 5000 Unit(s) SubCutaneous every 12 hours  ondansetron Injectable 4 milliGRAM(s) IV Push once  pantoprazole    Tablet 40 milliGRAM(s) Oral before breakfast  tamsulosin 0.4 milliGRAM(s) Oral at bedtime    MEDICATIONS  (PRN):  diphenoxylate/atropine 1 Tablet(s) Oral every 6 hours PRN Diarrhea  hydrALAZINE 10 milliGRAM(s) Oral every 4 hours PRN Systolic blood pressure > 180      PHYSICAL EXAM:  GENERAL:   HEAD:  Atraumatic, Normocephalic  EYES: EOMI, PERRLA, conjunctiva and sclera clear  NECK: Supple, No JVD, Normal thyroid, no enlarged nodes  NERVOUS SYSTEM:  Alert & Awake.   CHEST/LUNG: B/L good air entry; No rales, rhonchi, or wheezing  HEART: S1S2 normal, no S3, Regular rate and rhythm; systolic murmur most present on left sternal border.    ABDOMEN: Soft, mildly tender, Nondistended; Bowel sounds present  EXTREMITIES:  2+ Peripheral Pulses, No clubbing, cyanosis, or edema  LYMPH: No lymphadenopathy noted  SKIN: tunnelled cath with clean dressing on right and old AV fistula on left that has not been accessed.     Care Discussed with Consultants/Other Providers [ x] YES  [ ] NO    Assessment and plan    Symptomatic bradycardia  -junctional rhythm   -last coreg yesterday continue to hold AV niki blocking agents  -plan for pacemaker tomorrow per EP  -NPO after midnight    ESRD  -on Dialysis T Th S as outpatient  -dialysis today    hx of endocarditis  -blood cultures x2 pending    uncontrolled hypertension., Patient is a 64y old  Male who presents with a chief complaint of Bradycardia (14 Apr 2025 09:47)      INTERVAL HPI/OVERNIGHT EVENTS:   No overnight events   Patient HR in 40s does not have any complaints at this time feels well.   Afebrile, hemodynamically stable     ICU Vital Signs Last 24 Hrs  T(C): 36.1 (14 Apr 2025 08:00), Max: 36.7 (13 Apr 2025 22:00)  T(F): 97 (14 Apr 2025 08:00), Max: 98 (13 Apr 2025 22:00)  HR: 65 (14 Apr 2025 08:00) (30 - 65)  BP: 160/57 (14 Apr 2025 08:00) (142/45 - 194/66)  BP(mean): 89 (14 Apr 2025 08:00) (71 - 105)  ABP: --  ABP(mean): --  RR: 15 (14 Apr 2025 08:00) (10 - 18)  SpO2: 97% (14 Apr 2025 08:00) (92% - 100%)    O2 Parameters below as of 14 Apr 2025 08:00  Patient On (Oxygen Delivery Method): room air          I&O's Summary        LABS:                        9.7    3.77  )-----------( 88       ( 14 Apr 2025 03:12 )             31.6     04-14    140  |  101  |  104.9[H]  ----------------------------<  82  5.1   |  20.0[L]  |  8.17[H]    Ca    9.1      14 Apr 2025 03:12  Phos  7.8     04-14  Mg     2.2     04-14    TPro  6.7  /  Alb  3.9  /  TBili  0.8  /  DBili  x   /  AST  14  /  ALT  11  /  AlkPhos  90  04-14      Urinalysis Basic - ( 14 Apr 2025 03:12 )    Color: x / Appearance: x / SG: x / pH: x  Gluc: 82 mg/dL / Ketone: x  / Bili: x / Urobili: x   Blood: x / Protein: x / Nitrite: x   Leuk Esterase: x / RBC: x / WBC x   Sq Epi: x / Non Sq Epi: x / Bacteria: x                  RADIOLOGY & ADDITIONAL TESTS:    Consultant(s) Notes Reviewed:  [x ] YES  [ ] NO    MEDICATIONS  (STANDING):  chlorhexidine 2% Cloths 1 Application(s) Topical <User Schedule>  heparin   Injectable 5000 Unit(s) SubCutaneous every 12 hours  ondansetron Injectable 4 milliGRAM(s) IV Push once  pantoprazole    Tablet 40 milliGRAM(s) Oral before breakfast  tamsulosin 0.4 milliGRAM(s) Oral at bedtime    MEDICATIONS  (PRN):  diphenoxylate/atropine 1 Tablet(s) Oral every 6 hours PRN Diarrhea  hydrALAZINE 10 milliGRAM(s) Oral every 4 hours PRN Systolic blood pressure > 180      PHYSICAL EXAM:  GENERAL:   HEAD:  Atraumatic, Normocephalic  EYES: EOMI, PERRLA, conjunctiva and sclera clear  NECK: Supple, No JVD, Normal thyroid, no enlarged nodes  NERVOUS SYSTEM:  Alert & Awake.   CHEST/LUNG: B/L good air entry; No rales, rhonchi, or wheezing  HEART: S1S2 normal, no S3, Regular rate and rhythm; systolic murmur most present on left sternal border.    ABDOMEN: Soft, mildly tender, Nondistended; Bowel sounds present  EXTREMITIES:  2+ Peripheral Pulses, No clubbing, cyanosis, or edema  LYMPH: No lymphadenopathy noted  SKIN: tunnelled cath with clean dressing on right and old AV fistula on left that has not been accessed.     Care Discussed with Consultants/Other Providers [ x] YES  [ ] NO    Assessment and plan    Symptomatic bradycardia  -junctional rhythm   -last coreg yesterday continue to hold AV niki blocking agents  -plan for pacemaker tomorrow per EP  -NPO after midnight    ESRD  -on Dialysis T Th S as outpatient  -dialysis today    hx of endocarditis  -blood cultures x2 pending    uncontrolled hypertension.,  -hydralazine PRN

## 2025-04-14 NOTE — CONSULT NOTE ADULT - ASSESSMENT
This is 63 y/o male with hx of ESRD on HD (Tu, Th, Sat), Afib previously not on AC due to subdural hematoma, now s/p Watchman 8/2024, type A aortic dissection s/p repair 11/2020 c/b ischemic bowel s/p resection, CAD s/p re-op sternotomy CABG x2 and bioprosthetic  AVR/ MV repair, former substance use disorder, MV endocarditis 2023, seizures who presents with bradycardia. Pt was refused hemodialysis for the second time this week due to low HR and was referred to the hospital. In the ED pt found with HR 30's and hypothermia, BP elevated. Pt was started on empiric antibiotic coverage for endocarditis. EKG shows junctional bradycardia 32 bpm. Pt denies taking extra doses of beta blocker, denies dizziness, chest pain, SOB or palpitations. Pt follows with Dr. Frankel and Dr. Trevino.

## 2025-04-14 NOTE — CONSULT NOTE ADULT - SUBJECTIVE AND OBJECTIVE BOX
[facility]  [facility]   Office: [address], [place] [address]  Telephone: [phone] Fax:[phone]      HPI:  Pt is a 63 y/o male ESRD HD T,Th,S, atrial fibrillation s/p ablation (PVI, CTI, Mitral line, 2/2021), LAAO with Watchman device 8/2024, type A aortic dissection s/p repair 11/2020 c/b ischemic bowel s/p resection, CAD s/p re-op sternotomy CABG x2 and bioprosthetic  AVR/ MV repair, former substance use disorder, MV endocarditis 2023, recent hospitalization 3/18 - 3/21 due to malfunctioning LUE fistula now s/p R sided IR permacath placement, who now presents to [facility] with complaints of bradycardia. Pt states his dialysis center would not perform dialysis on his this past Thursday and Saturday due to bradycardia and advised him to come to the hospital for an assessment. EKG and telemetry review at time of arrival reveals junctional bradycardia 30-40s. Pt reports no recent or remote syncopal events. Currently denies dizziness, fatigue, chest pain, SOB, palpitations, dizziness.    EKG: Junctional bradycardia  Telemetry: Junctional bradycardia 30-40s. Competing sinus beats at times.      Cardiology Summary:  Holter Monitor 6/2023: Sinus bradycardia with intermittent junctional rhythm    TTE 12/2024:  Left ventricular cavity is normal in size. Left ventricular systolic function is low normal with an ejection fraction of 55 % by Guadarrama's method of disks with an ejection fraction visually estimated at 50 to 55 %.            PAST MEDICAL & SURGICAL HISTORY:  CHF (congestive heart failure)      CVA (cerebral vascular accident)      Seizures  last seizure 10 years ago      HTN (hypertension)      Hyperlipemia      COVID-19 october 2020      Atrial fibrillation      Former smoker      History of cocaine use  remote hx, currently sober      H/O aortic dissection  s/p repair (2010), surgery complicated by bowel ischemia s/p bowel resection and ostomy with reversal      H/O aortic valve insufficiency      Mitral regurgitation      GIB (gastrointestinal bleeding)      CAD (coronary artery disease)  s/p PCI 1998  and CABG x 2 11/2020      Chronic atrial fibrillation      Aneurysm of artery of upper extremity      Anemia of chronic disease      End stage renal disease      Myocardial infarction      COVID-19 vaccine series completed      Port-A-Cath in place      H/O colectomy      Status post double vessel coronary artery bypass  11/2020 Dr Butler      S/P AVR (aortic valve replacement)  (t)AVR 11/2020 Dr Butler      S/P MVR (mitral valve replacement)  (t)MVR 11/2020 Dr Butler      H/O aortic dissection  Type A; emergent repair 2010      AV fistula  LUE      History of renal transplant      Presence of Watchman left atrial appendage closure device          REVIEW OF SYSTEMS:    CONSTITUTIONAL: No fever, weight loss, or fatigue  EYES: No visual disturbances  NECK: No pain or stiffness  RESPIRATORY: No cough, wheezing, chills or hemoptysis; No shortness of breath  CARDIOVASCULAR: see HPI  GASTROINTESTINAL: No abdominal or epigastric pain. No nausea, vomiting, or hematemesis; No diarrhea or constipation. No melena or hematochezia.  NEUROLOGICAL: No headaches, memory loss, loss of strength, numbness, or tremors        MEDICATIONS  (STANDING):  chlorhexidine 2% Cloths 1 Application(s) Topical <User Schedule>  heparin   Injectable 5000 Unit(s) SubCutaneous every 12 hours  pantoprazole    Tablet 40 milliGRAM(s) Oral before breakfast  tamsulosin 0.4 milliGRAM(s) Oral at bedtime    MEDICATIONS  (PRN):  diphenoxylate/atropine 1 Tablet(s) Oral every 6 hours PRN Diarrhea      Allergies    penicillin (Other)    Intolerances          FAMILY HISTORY:  FH: hypertension    Family history of cardiac disorder (Mother)  mother        Vital Signs Last 24 Hrs  T(C): 36.1 (14 Apr 2025 08:00), Max: 36.7 (13 Apr 2025 22:00)  T(F): 97 (14 Apr 2025 08:00), Max: 98 (13 Apr 2025 22:00)  HR: 65 (14 Apr 2025 08:00) (30 - 65)  BP: 160/57 (14 Apr 2025 08:00) (142/45 - 194/66)  BP(mean): 89 (14 Apr 2025 08:00) (71 - 105)  RR: 15 (14 Apr 2025 08:00) (10 - 18)  SpO2: 97% (14 Apr 2025 08:00) (92% - 100%)    Parameters below as of 14 Apr 2025 08:00  Patient On (Oxygen Delivery Method): room air        Physical Exam:    Constitutional: NAD  Head: normocephalic atraumatic  Neck: supple, full range of motion, nontender to palpation midline  Chest wall: R sided Permacath dressing C/D/I  Resp: effort normal, breath sounds clear to auscultation bilaterally  Cardiac: Bradycardic. S1/S2.  Abdomen: soft, nondistended, bowel sounds active, nontender to palpation in all four quadrants    Neuro: Alert and oriented x 3        LABS:                        9.7    3.77  )-----------( 88       ( 14 Apr 2025 03:12 )             31.6   04-14    140  |  101  |  104.9[H]  ----------------------------<  82  5.1   |  20.0[L]  |  8.17[H]    Ca    9.1      14 Apr 2025 03:12  Phos  7.8     04-14  Mg     2.2     04-14    TPro  6.7  /  Alb  3.9  /  TBili  0.8  /  DBili  x   /  AST  14  /  ALT  11  /  AlkPhos  90  04-14  LIVER FUNCTIONS - ( 14 Apr 2025 03:12 )  Alb: 3.9 g/dL / Pro: 6.7 g/dL / ALK PHOS: 90 U/L / ALT: 11 U/L / AST: 14 U/L / GGT: x               Urinalysis Basic - ( 14 Apr 2025 03:12 )    Color: x / Appearance: x / SG: x / pH: x  Gluc: 82 mg/dL / Ketone: x  / Bili: x / Urobili: x   Blood: x / Protein: x / Nitrite: x   Leuk Esterase: x / RBC: x / WBC x   Sq Epi: x / Non Sq Epi: x / Bacteria: x        RADIOLOGY & ADDITIONAL STUDIES:    TTE pending:        Assessment:      Pt is a 63 y/o male ESRD HD T,Th,S, atrial fibrillation s/p ablation (PVI, CTI, Mitral line, 2/2021), LAAO with Watchman device 8/2024, type A aortic dissection s/p repair 11/2020 c/b ischemic bowel s/p resection, CAD s/p re-op sternotomy CABG x2 and bioprosthetic  AVR/ MV repair, former substance use disorder, MV endocarditis 2023, recent hospitalization 3/18 - 3/21 due to malfunctioning LUE fistula now s/p R sided IR permacath placement, who now presents to [facility] with complaints of bradycardia. Pt states his dialysis center would not perform dialysis on his this past Thursday and Saturday due to bradycardia and advised him to come to the hospital for an assessment. EKG and telemetry review at time of arrival reveals junctional bradycardia 30-40s. Pt reports no recent or remote syncopal events. Currently denies dizziness, fatigue, chest pain, SOB, palpitations, dizziness.      incomplete note           [facility]  [facility]   Office: [address], [place] [address]  Telephone: [phone] Fax:[phone]      HPI:  Pt is a 65 y/o male ESRD HD T,Th,S, atrial fibrillation s/p ablation (PVI, CTI, Mitral line, 2/2021), LAAO with Watchman device 8/2024, type A aortic dissection s/p repair 11/2020 c/b ischemic bowel s/p resection, CAD s/p re-op sternotomy CABG x2 and bioprosthetic  AVR/ MV repair, former substance use disorder, MV endocarditis 2023, recent hospitalization 3/18 - 3/21 due to malfunctioning LUE fistula now s/p R sided IR permacath placement, who now presents to [facility] with complaints of bradycardia. Pt states his dialysis center would not perform dialysis on his this past Thursday and Saturday due to bradycardia and advised him to come to the hospital for an assessment. EKG and telemetry review at time of arrival reveals junctional bradycardia 30-40s. Pt reports no recent or remote syncopal events. Currently denies dizziness, fatigue, chest pain, SOB, palpitations, dizziness.    EKG: Junctional bradycardia  Telemetry: Junctional bradycardia 30-40s. Competing sinus beats at times.      Cardiology Summary:  Holter Monitor 6/2023: Sinus bradycardia with intermittent junctional rhythm    TTE 12/2024:  Left ventricular cavity is normal in size. Left ventricular systolic function is low normal with an ejection fraction of 55 % by Guadarrama's method of disks with an ejection fraction visually estimated at 50 to 55 %.            PAST MEDICAL & SURGICAL HISTORY:  CHF (congestive heart failure)      CVA (cerebral vascular accident)      Seizures  last seizure 10 years ago      HTN (hypertension)      Hyperlipemia      COVID-19 october 2020      Atrial fibrillation      Former smoker      History of cocaine use  remote hx, currently sober      H/O aortic dissection  s/p repair (2010), surgery complicated by bowel ischemia s/p bowel resection and ostomy with reversal      H/O aortic valve insufficiency      Mitral regurgitation      GIB (gastrointestinal bleeding)      CAD (coronary artery disease)  s/p PCI 1998  and CABG x 2 11/2020      Chronic atrial fibrillation      Aneurysm of artery of upper extremity      Anemia of chronic disease      End stage renal disease      Myocardial infarction      COVID-19 vaccine series completed      Port-A-Cath in place      H/O colectomy      Status post double vessel coronary artery bypass  11/2020 Dr Butler      S/P AVR (aortic valve replacement)  (t)AVR 11/2020 Dr Butler      S/P MVR (mitral valve replacement)  (t)MVR 11/2020 Dr Butler      H/O aortic dissection  Type A; emergent repair 2010      AV fistula  LUE      History of renal transplant      Presence of Watchman left atrial appendage closure device          REVIEW OF SYSTEMS:    CONSTITUTIONAL: No fever, weight loss, or fatigue  EYES: No visual disturbances  NECK: No pain or stiffness  RESPIRATORY: No cough, wheezing, chills or hemoptysis; No shortness of breath  CARDIOVASCULAR: see HPI  GASTROINTESTINAL: No abdominal or epigastric pain. No nausea, vomiting, or hematemesis; No diarrhea or constipation. No melena or hematochezia.  NEUROLOGICAL: No headaches, memory loss, loss of strength, numbness, or tremors        MEDICATIONS  (STANDING):  chlorhexidine 2% Cloths 1 Application(s) Topical <User Schedule>  heparin   Injectable 5000 Unit(s) SubCutaneous every 12 hours  pantoprazole    Tablet 40 milliGRAM(s) Oral before breakfast  tamsulosin 0.4 milliGRAM(s) Oral at bedtime    MEDICATIONS  (PRN):  diphenoxylate/atropine 1 Tablet(s) Oral every 6 hours PRN Diarrhea      Allergies    penicillin (Other)    Intolerances          FAMILY HISTORY:  FH: hypertension    Family history of cardiac disorder (Mother)  mother        Vital Signs Last 24 Hrs  T(C): 36.1 (14 Apr 2025 08:00), Max: 36.7 (13 Apr 2025 22:00)  T(F): 97 (14 Apr 2025 08:00), Max: 98 (13 Apr 2025 22:00)  HR: 65 (14 Apr 2025 08:00) (30 - 65)  BP: 160/57 (14 Apr 2025 08:00) (142/45 - 194/66)  BP(mean): 89 (14 Apr 2025 08:00) (71 - 105)  RR: 15 (14 Apr 2025 08:00) (10 - 18)  SpO2: 97% (14 Apr 2025 08:00) (92% - 100%)    Parameters below as of 14 Apr 2025 08:00  Patient On (Oxygen Delivery Method): room air        Physical Exam:    Constitutional: NAD  Head: normocephalic atraumatic  Neck: supple, full range of motion, nontender to palpation midline  Chest wall: R sided Permacath dressing C/D/I  Resp: effort normal, breath sounds clear to auscultation bilaterally  Cardiac: Bradycardic. S1/S2.  Abdomen: soft, nondistended, bowel sounds active, nontender to palpation in all four quadrants    Neuro: Alert and oriented x 3        LABS:                        9.7    3.77  )-----------( 88       ( 14 Apr 2025 03:12 )             31.6   04-14    140  |  101  |  104.9[H]  ----------------------------<  82  5.1   |  20.0[L]  |  8.17[H]    Ca    9.1      14 Apr 2025 03:12  Phos  7.8     04-14  Mg     2.2     04-14    TPro  6.7  /  Alb  3.9  /  TBili  0.8  /  DBili  x   /  AST  14  /  ALT  11  /  AlkPhos  90  04-14  LIVER FUNCTIONS - ( 14 Apr 2025 03:12 )  Alb: 3.9 g/dL / Pro: 6.7 g/dL / ALK PHOS: 90 U/L / ALT: 11 U/L / AST: 14 U/L / GGT: x               Urinalysis Basic - ( 14 Apr 2025 03:12 )    Color: x / Appearance: x / SG: x / pH: x  Gluc: 82 mg/dL / Ketone: x  / Bili: x / Urobili: x   Blood: x / Protein: x / Nitrite: x   Leuk Esterase: x / RBC: x / WBC x   Sq Epi: x / Non Sq Epi: x / Bacteria: x        RADIOLOGY & ADDITIONAL STUDIES:    TTE pending:        Assessment:    Pt is a 65 y/o male ESRD HD T,Th,S, atrial fibrillation s/p ablation (PVI, CTI, Mitral line, 2/2021), LAAO with Watchman device 8/2024, type A aortic dissection s/p repair 11/2020 c/b ischemic bowel s/p resection, CAD s/p re-op sternotomy CABG x2 and bioprosthetic  AVR/ MV repair, former substance use disorder, MV endocarditis 2023, recent hospitalization 3/18 - 3/21 due to malfunctioning LUE fistula now s/p R sided IR permacath placement, who now presents to [facility] with complaints of bradycardia. Pt states his dialysis center would not perform dialysis on his this past Thursday and Saturday due to bradycardia and advised him to come to the hospital for an assessment. EKG and telemetry review at time of arrival reveals junctional bradycardia 30-40s. Pt reports no recent or remote syncopal events. Currently denies dizziness, fatigue, chest pain, SOB, palpitations, dizziness.      Plan:  1) Junctional rhythm with competing sinus bradycardia  - Continue to hold all AVN blocking agents. Pt states last Coreg dose was yesterday evening  - Obtain TTE  - Ambulate with assistance and monitor for chronotropic response  - Maintain telemetry monitoring  - If no significant improvement in sinus node function overnight will tentatively plan for PPM implant tomorrow. Likely leadless PPM due to presence of L sided AVF and R sided semi-perm cath  - NPO after midnight  - EP to follow    Above discussed with Dr. Trevino and ICU team

## 2025-04-14 NOTE — CONSULT NOTE ADULT - SUBJECTIVE AND OBJECTIVE BOX
[facility]                                              CARDIOLOGY AT [address], [place] [address]                                             Telephone: [phone]. Fax:[phone]                                                       CARDIOLOGY CONSULTATION NOTE                                                                                             History obtained by: Patient and medical record  Community Cardiologist: Dr. Frankel, dr. Trevino   obtained: Yes [  ] No [ x ]  Reason for Consultation: bradycardia  Available out pt records reviewed: Yes [x  ] No [  ]    Chief complaint:    Patient is a 64y old  Male who presents with a chief complaint of Bradycardia (14 Apr 2025 00:12)      HPI:  This is 63 y/o male with hx of ESRD on HD (Tu, Th, Sat), Afib previously not on AC due to subdural hematoma, now s/p Watchman 8/2024, type A aortic dissection s/p repair 11/2020 c/b ischemic bowel s/p resection, CAD s/p re-op sternotomy CABG x2 and bioprosthetic  AVR/ MV repair, former substance use disorder, MV endocarditis 2023, seizures who presents with bradycardia. Pt was refused hemodialysis for the second time this week due to low HR and was referred to the hospital. In the ED pt found with HR 30's and hypothermia, BP elevated. Pt was started on empiric antibiotic coverage for endocarditis. EKG shows junctional bradycardia 32 bpm. Pt denies taking extra doses of beta blocker, denies dizziness, chest pain, SOB or palpitations. Pt follows with Dr. Frankel and Dr. Trevino.    CARDIAC TESTING   ECHO:  < from: TTE W or WO Ultrasound Enhancing Agent (12.04.24 @ 08:51) >  CONCLUSIONS:      1. Left ventricularcavity is normal in size. Left ventricular systolic function is low normal with an ejection fraction of 55 % by Guadarrama's method of disks with an ejection fraction visually estimated at 50 to 55 %.   2. Reduced right ventricular systolic function.   3. Left atrium is normal in size.   4. Estimated pulmonary artery systolic pressure is 55 mmHg.   5. Moderate left ventricular hypertrophy.    < end of copied text >      STRESS: n/a    CATH: n/a      ELECTROPHYSIOLOGY:   < from: Electrophysiology Order (08.02.24 @ 14:33) >  Indications   Atrial Fibrillation     Primary ICD-10 CM   I48.0 - Paroxysmal atrial fibrillation     CPT Code:                  59528  - BLAINE transcatheter closure     Procedures Performed   Primary Procedures:      Left atrial appendage occlusion device  implantation    Conclusions   1. Successful deployment of the WATCHMAN FLX PRO device in the left  atrial appendage. A 27 mm device was deployed at  the BLAINE ostium.      < end of copied text >      PAST MEDICAL HISTORY  CHF (congestive heart failure)    CVA (cerebral vascular accident)    Chronic kidney disease    Seizures    HTN (hypertension)    Hyperlipemia    COVID-19    Atrial fibrillation    ESRD on dialysis    Former smoker    History of cocaine use    H/O aortic dissection    H/O aortic valve insufficiency    Mitral regurgitation    GIB (gastrointestinal bleeding)    CAD (coronary artery disease)    Chronic atrial fibrillation    Aneurysm of artery of upper extremity    Anemia of chronic disease    End stage renal disease    Myocardial infarction    COVID-19 vaccine series completed    Port-A-Cath in place        PAST SURGICAL HISTORY  Aorta disorder    H/O colectomy    Status post double vessel coronary artery bypass    S/P AVR (aortic valve replacement)    S/P MVR (mitral valve replacement)    H/O aortic dissection    AV fistula    History of renal transplant    Presence of Watchman left atrial appendage closure device        SOCIAL HISTORY:  lives alone  CIGARETTES:     ALCOHOL:  DRUGS: former cocaine abuse    FAMILY HISTORY:  FH: hypertension    Family history of cardiac disorder (Mother)  mother      Family History of Cardiovascular Disease:  Yes [  ] No [  ]  Coronary Artery Disease in first degree relative: Yes [  ] No [  ]  Sudden Cardiac Death in First degree relative: Yes [  ] No [  ]    HOME MEDICATIONS:  acetaminophen 325 mg oral tablet: 2 tab(s) orally every 6 hours As needed Temp greater or equal to 38C (100.4F), Mild Pain (1 - 3) (19 Mar 2025 00:28)  amLODIPine 10 mg oral tablet: 1 tab(s) orally once a day (19 Mar 2025 00:28)  aspirin 81 mg oral tablet: orally once a day followup with your cardiology about timing of resumption of Aspirin (21 Mar 2025 14:19)  atorvastatin 40 mg oral tablet: 1 tab(s) orally once a day (at bedtime) (19 Mar 2025 00:28)  carvedilol 6.25 mg oral tablet: 1 tab(s) orally 2 times a day (19 Mar 2025 00:28)  cloNIDine 0.3 mg oral tablet: 1 tab(s) orally 2 times a day (19 Mar 2025 00:28)  hydrALAZINE 100 mg oral tablet: 1 tab(s) orally 2 times a day (19 Mar 2025 00:28)  isosorbide mononitrate 60 mg oral tablet, extended release: 1 tab(s) orally once a day (19 Mar 2025 00:28)  levETIRAcetam 500 mg oral tablet: 1 tab(s) orally 2 times a day (19 Mar 2025 00:28)  Lomotil 2.5 mg-0.025 mg oral tablet: 1 tab(s) orally once a day (19 Mar 2025 00:28)  tamsulosin 0.4 mg oral capsule: 1 cap(s) orally once a day (at bedtime) (19 Mar 2025 00:28)  torsemide 20 mg oral tablet: 1 tab(s) orally once a day (19 Mar 2025 00:28)        CURRENT OTHER MEDICATIONS:  diphenoxylate/atropine 1 Tablet(s) Oral every 6 hours PRN Diarrhea  pantoprazole    Tablet 40 milliGRAM(s) Oral before breakfast  chlorhexidine 2% Cloths 1 Application(s) Topical <User Schedule>  heparin   Injectable 5000 Unit(s) SubCutaneous every 12 hours  tamsulosin 0.4 milliGRAM(s) Oral at bedtime      ALLERGIES:   penicillin (Other)      REVIEW OF SYMPTOMS:   CONSTITUTIONAL: No fever, no chills, no weight loss, no weight gain, no fatigue   ENMT:  No vertigo; No sinus or throat pain  NECK: No pain or stiffness  CARDIOVASCULAR: No chest pain, no dyspnea, no syncope/presyncope, no palpitations, no dizziness, no Orthopnea, no Paroxsymal nocturnal dyspnea  RESPIRATORY: no Shortness of breath, no cough, no wheezing  : No dysuria, no hematuria   GI: No dark color stool, no nausea, no diarrhea, no constipation, no abdominal pain   NEURO: No headache, no slurred speech   MUSCULOSKELETAL: No joint pain or swelling; No muscle, back, or extremity pain  PSYCH: No agitation, no anxiety.    ALL OTHER REVIEW OF SYSTEMS ARE NEGATIVE.    VITAL SIGNS:  T(C): 35.9 (04-14-25 @ 02:00), Max: 36.7 (04-13-25 @ 22:00)  T(F): 96.6 (04-14-25 @ 02:00), Max: 98 (04-13-25 @ 22:00)  HR: 40 (04-14-25 @ 02:00) (32 - 40)  BP: 180/58 (04-14-25 @ 02:00) (146/50 - 194/66)  RR: 15 (04-14-25 @ 02:00) (14 - 18)  SpO2: 92% (04-14-25 @ 02:00) (92% - 100%)    INTAKE AND OUTPUT:       PHYSICAL EXAM:  Constitutional: Comfortable . No acute distress.   HEENT: Atraumatic and normocephalic , neck is supple . no JVD. No carotid bruit.  CNS: A&Ox3. No focal deficits.   Respiratory: CTAB, unlabored   Cardiovascular: bradycardic, RRR normal s1 s2. No murmurs  Gastrointestinal: Soft, non-tender. +Bowel sounds.   Extremities: 2+ Peripheral Pulses, No clubbing, cyanosis, or edema  Psychiatric: Calm . no agitation.   Skin: Warm and dry, no ulcers on extremities     LABS:                            10.4   4.42  )-----------( 105      ( 13 Apr 2025 19:41 )             33.6     04-13    141  |  102  |  101.4[H]  ----------------------------<  88  4.7   |  19.0[L]  |  7.90[H]    Ca    8.9      13 Apr 2025 19:41  Phos  7.5     04-13  Mg     2.0     04-13    TPro  7.1  /  Alb  4.2  /  TBili  0.8  /  DBili  x   /  AST  16  /  ALT  12  /  AlkPhos  96  04-13      Urinalysis Basic - ( 13 Apr 2025 19:41 )    Color: x / Appearance: x / SG: x / pH: x  Gluc: 88 mg/dL / Ketone: x  / Bili: x / Urobili: x   Blood: x / Protein: x / Nitrite: x   Leuk Esterase: x / RBC: x / WBC x   Sq Epi: x / Non Sq Epi: x / Bacteria: x      Thyroid Stimulating Hormone, Serum: 11.63 uIU/mL (04-13-25 @ 19:41)      INTERPRETATION OF TELEMETRY: junctional bradycardia 30's-40's    ECG: junctional bradycardia 32 bpm  Prior ECG: Yes [x  ] No [  ]    RADIOLOGY & ADDITIONAL STUDIES: n/a   X-ray:    CT scan:   MRI:   US:

## 2025-04-14 NOTE — H&P ADULT - HISTORY OF PRESENT ILLNESS
This is a 63 y/o male with PMH of ESRD HD T,Th,S (follows with [facility]), Afib (not on AC 2/2 prior GIB now s/p watchman), CAD s/p CABG, former substance use disorder, Mitral valve infective endocarditis (2023), aortic and mitral valve replacement, Type A aortic dissection s/p repair c/b ischemic bowel s/p resection, seizure disorder, and recent hospital admission  for PNA (01/2025) presenting with asymptomatic bradycardia. Patient states when he went to dialysis this past Thursday and Saturday they refused to dialyze him due to his heart rate being too low.  In ED EKG found to have junctional bradycardia and hypertensive to the 200s. He was given atropine and calcium with brief improvement in heart rate. Cardiology was consulted for possible pacemaker.

## 2025-04-14 NOTE — CONSULT NOTE ADULT - SUBJECTIVE AND OBJECTIVE BOX
Patient is a 64y old  Male who presents with a chief complaint of Bradycardia (14 Apr 2025 08:44)   Acute  renal failure.    HPI:  This is a 63 y/o male with PMH of ESRD HD T,Th,S (follows with [facility]), Afib (not on AC 2/2 prior GIB now s/p watchman), CAD s/p CABG, former substance use disorder, Mitral valve infective endocarditis (2023), aortic and mitral valve replacement, Type A aortic dissection s/p repair c/b ischemic bowel s/p resection, seizure disorder, and recent hospital admission  for PNA (01/2025) presenting with asymptomatic bradycardia. Patient states when he went to dialysis this past Thursday and Saturday they refused to dialyze him due to his heart rate being too low.  In ED EKG found to have junctional bradycardia and hypertensive to the 200s. He was given atropine and calcium with brief improvement in heart rate. Cardiology was consulted for possible pacemaker. (14 Apr 2025 00:12)   Hx renal stones x1 not aware of type, no other renal  problems; elevated creatinine on admission.    PAST MEDICAL & SURGICAL HISTORY:  CHF (congestive heart failure)      CVA (cerebral vascular accident)      Seizures  last seizure 10 years ago      HTN (hypertension)      Hyperlipemia      COVID-19  october 2020      Atrial fibrillation      Former smoker      History of cocaine use  remote hx, currently sober      H/O aortic dissection  s/p repair (2010), surgery complicated by bowel ischemia s/p bowel resection and ostomy with reversal      H/O aortic valve insufficiency      Mitral regurgitation      GIB (gastrointestinal bleeding)      CAD (coronary artery disease)  s/p PCI 1998  and CABG x 2 11/2020      Chronic atrial fibrillation      Aneurysm of artery of upper extremity      Anemia of chronic disease      End stage renal disease      Myocardial infarction      COVID-19 vaccine series completed      Port-A-Cath in place      H/O colectomy      Status post double vessel coronary artery bypass  11/2020 Dr Butler      S/P AVR (aortic valve replacement)  (t)AVR 11/2020 Dr Butler      S/P MVR (mitral valve replacement)  (t)MVR 11/2020 Dr Butler      H/O aortic dissection  Type A; emergent repair 2010      AV fistula  LUE      History of renal transplant      Presence of Watchman left atrial appendage closure device       DM 2, MO, hypothyroidism, prior DVT and IVC filter; Cholecystectomy    FAMILY HISTORY:  FH: hypertension    Family history of cardiac disorder (Mother)  mother    [place]    Social History:Non smoker    MEDICATIONS  (STANDING):  chlorhexidine 2% Cloths 1 Application(s) Topical <User Schedule>  heparin   Injectable 5000 Unit(s) SubCutaneous every 12 hours  pantoprazole    Tablet 40 milliGRAM(s) Oral before breakfast  tamsulosin 0.4 milliGRAM(s) Oral at bedtime    MEDICATIONS  (PRN):  diphenoxylate/atropine 1 Tablet(s) Oral every 6 hours PRN Diarrhea  hydrALAZINE 10 milliGRAM(s) Oral every 4 hours PRN Systolic blood pressure > 180   Meds reviewed    Allergies    penicillin (Other)    Intolerances     ? lasix    REVIEW OF SYSTEMS:    CONSTITUTIONAL:  sob, weight gain, feels weak  EYES: No eye pain, visual disturbances, or discharge  ENMT:  No difficulty hearing, tinnitus, vertigo; No sinus or throat pain  NECK: No pain or stiffness  BREASTS: No pain, masses, or nipple discharge  RESPIRATORY: sob  CARDIOVASCULAR: No chest pain, palpitations, dizziness,   GASTROINTESTINAL: No abdominal or epigastric pain. No nausea, vomiting, or hematemesis; No diarrhea or constipation. No melena or hematochezia.Trunckal obesity  GENITOURINARY: No dysuria, frequency, hematuria, or incontinence  NEUROLOGICAL: No headaches, memory loss, loss of strength, numbness, or tremors  SKIN: Diffuse erythema, no blisters  LYMPH NODES: No enlarged glands  ENDOCRINE: No heat or cold intolerance; No hair loss  MUSCULOSKELETAL: Chronic lymphedema legs with scars  PSYCHIATRIC: No depression, anxiety, mood swings, or difficulty sleeping  HEME/LYMPH: No easy bruising, or bleeding gums  ALLERY AND IMMUNOLOGIC: No hives or eczema      Vital Signs Last 24 Hrs  T(C): 36.1 (14 Apr 2025 08:00), Max: 36.7 (13 Apr 2025 22:00)  T(F): 97 (14 Apr 2025 08:00), Max: 98 (13 Apr 2025 22:00)  HR: 65 (14 Apr 2025 08:00) (30 - 65)  BP: 160/57 (14 Apr 2025 08:00) (142/45 - 194/66)  BP(mean): 89 (14 Apr 2025 08:00) (71 - 105)  RR: 15 (14 Apr 2025 08:00) (10 - 18)  SpO2: 97% (14 Apr 2025 08:00) (92% - 100%)    Parameters below as of 14 Apr 2025 08:00  Patient On (Oxygen Delivery Method): room air      Daily Height in cm: 167.64 (13 Apr 2025 19:10)    Daily     PHYSICAL EXAM:    GENERAL: appears chronically ill, oriented.  HEAD:  Atraumatic, Normocephalic  EYES: EOMI, PERRLA, conjunctiva and sclera clear  ENMT: No tonsillar erythema, exudates, or enlargement; Moist mucous membranes, Good dentition, No lesions  NECK: Supple, neck  veins full  NERVOUS SYSTEM:  Alert & Oriented X3, Good concentration; Motor Strength wnl upper and lower extremities;CHEST/LUNG: Clear to percussion bilaterally; No rales, rhonchi, wheezing, or rubs  HEART: Regular rate and rhythm; No murmurs, rubs, or gallops  ABDOMEN: Soft, Nontender, Nondistended; Bowel sounds present, body wall and flank edema  EXTREMITIES:  +2 - +3 leg edema with sligt erythema edema  LYMPH: No lymphadenopathy noted  SKIN: No rashes or lesions, pale      LABS:                        9.7    3.77  )-----------( 88       ( 14 Apr 2025 03:12 )             31.6     04-14    140  |  101  |  104.9[H]  ----------------------------<  82  5.1   |  20.0[L]  |  8.17[H]    Ca    9.1      14 Apr 2025 03:12  Phos  7.8     04-14  Mg     2.2     04-14    TPro  6.7  /  Alb  3.9  /  TBili  0.8  /  DBili  x   /  AST  14  /  ALT  11  /  AlkPhos  90  04-14      Urinalysis Basic - ( 14 Apr 2025 03:12 )    Color: x / Appearance: x / SG: x / pH: x  Gluc: 82 mg/dL / Ketone: x  / Bili: x / Urobili: x   Blood: x / Protein: x / Nitrite: x   Leuk Esterase: x / RBC: x / WBC x   Sq Epi: x / Non Sq Epi: x / Bacteria: x      Magnesium: 2.2 mg/dL (04-14 @ 03:12)  Phosphorus: 7.8 mg/dL (04-14 @ 03:12)  Magnesium: 2.0 mg/dL (04-13 @ 19:41)  Phosphorus: 7.5 mg/dL (04-13 @ 19:41)          RADIOLOGY & ADDITIONAL TESTS:     HPI:  This is a 65 y/o male with PMH of ESRD HD T,Th,S (follows with Dr Monaco), Afib (not on AC 2/2 prior GIB now s/p watchman), CAD s/p CABG, former substance use disorder, Mitral valve infective endocarditis (2023), aortic and mitral valve replacement, Type A aortic dissection s/p repair c/b ischemic bowel s/p resection, seizure disorder, and recent hospital admission  for PNA (01/2025) presenting with asymptomatic bradycardia. Patient states when he went to dialysis this past Thursday and Saturday they refused to dialyze him due to his heart rate being too low.  In ED EKG found to have junctional bradycardia and hypertensive to the 200s. He was given atropine and calcium with brief improvement in heart rate. Cardiology was consulted for possible pacemaker. On my exam is in MICU feels ok HR in 40-50 s  Denies HA CP n/V/D no SOB    PAST MEDICAL & SURGICAL HISTORY:  CHF (congestive heart failure)      CVA (cerebral vascular accident)      Seizures  last seizure 10 years ago      HTN (hypertension)      Hyperlipemia      COVID-19  october 2020      Atrial fibrillation      Former smoker      History of cocaine use  remote hx, currently sober      H/O aortic dissection  s/p repair (2010), surgery complicated by bowel ischemia s/p bowel resection and ostomy with reversal      H/O aortic valve insufficiency      Mitral regurgitation      GIB (gastrointestinal bleeding)      CAD (coronary artery disease)  s/p PCI 1998  and CABG x 2 11/2020      Chronic atrial fibrillation      Aneurysm of artery of upper extremity      Anemia of chronic disease      End stage renal disease      Myocardial infarction      COVID-19 vaccine series completed      Port-A-Cath in place      H/O colectomy      Status post double vessel coronary artery bypass  11/2020 Dr Butler      S/P AVR (aortic valve replacement)  (t)AVR 11/2020 Dr Butler      S/P MVR (mitral valve replacement)  (t)MVR 11/2020 Dr Butler      H/O aortic dissection  Type A; emergent repair 2010      AV fistula  LUE      History of renal transplant      Presence of Watchman left atrial appendage closure device      FAMILY HISTORY:  FH: hypertension    Family history of cardiac disorder (Mother)  mother    NC    Social History:Non smoker    MEDICATIONS  (STANDING):  chlorhexidine 2% Cloths 1 Application(s) Topical <User Schedule>  heparin   Injectable 5000 Unit(s) SubCutaneous every 12 hours  pantoprazole    Tablet 40 milliGRAM(s) Oral before breakfast  tamsulosin 0.4 milliGRAM(s) Oral at bedtime    MEDICATIONS  (PRN):  diphenoxylate/atropine 1 Tablet(s) Oral every 6 hours PRN Diarrhea  hydrALAZINE 10 milliGRAM(s) Oral every 4 hours PRN Systolic blood pressure > 180   Meds reviewed    Allergies    penicillin (Other)    Vital Signs Last 24 Hrs  T(C): 36.1 (14 Apr 2025 08:00), Max: 36.7 (13 Apr 2025 22:00)  T(F): 97 (14 Apr 2025 08:00), Max: 98 (13 Apr 2025 22:00)  HR: 65 (14 Apr 2025 08:00) (30 - 65)  BP: 160/57 (14 Apr 2025 08:00) (142/45 - 194/66)  BP(mean): 89 (14 Apr 2025 08:00) (71 - 105)  RR: 15 (14 Apr 2025 08:00) (10 - 18)  SpO2: 97% (14 Apr 2025 08:00) (92% - 100%)    Parameters below as of 14 Apr 2025 08:00  Patient On (Oxygen Delivery Method): room air      Daily Height in cm: 167.64 (13 Apr 2025 19:10)    Daily     PHYSICAL EXAM:    GENERAL: NAD  HEAD:  NCA  EYES: EOMI  NECK: Supple  NERVOUS SYSTEM:  Alert & Oriented X3  CHEST/LUNG: Clear to percussion bilaterally  HEART: Suresh cardic  ABDOMEN: Soft, Nontender, Nondistended; +BS  EXTREMITIES:  no edema    LABS:                        9.7    3.77  )-----------( 88       ( 14 Apr 2025 03:12 )             31.6     04-14    140  |  101  |  104.9[H]  ----------------------------<  82  5.1   |  20.0[L]  |  8.17[H]    Ca    9.1      14 Apr 2025 03:12  Phos  7.8     04-14  Mg     2.2     04-14    TPro  6.7  /  Alb  3.9  /  TBili  0.8  /  DBili  x   /  AST  14  /  ALT  11  /  AlkPhos  90  04-14      Urinalysis Basic - ( 14 Apr 2025 03:12 )    Color: x / Appearance: x / SG: x / pH: x  Gluc: 82 mg/dL / Ketone: x  / Bili: x / Urobili: x   Blood: x / Protein: x / Nitrite: x   Leuk Esterase: x / RBC: x / WBC x   Sq Epi: x / Non Sq Epi: x / Bacteria: x      Magnesium: 2.2 mg/dL (04-14 @ 03:12)  Phosphorus: 7.8 mg/dL (04-14 @ 03:12)  Magnesium: 2.0 mg/dL (04-13 @ 19:41)  Phosphorus: 7.5 mg/dL (04-13 @ 19:41)          RADIOLOGY & ADDITIONAL TESTS:     HPI:  This is a 63 y/o male with PMH of ESRD HD T,Th,S (follows with Dr Monaco), Afib (not on AC 2/2 prior GIB now s/p watchman), CAD s/p CABG, former substance use disorder, Mitral valve infective endocarditis (2023), aortic and mitral valve replacement, Type A aortic dissection s/p repair c/b ischemic bowel s/p resection, seizure disorder, and recent hospital admission  for PNA (01/2025) presenting with asymptomatic bradycardia. Patient states when he went to dialysis this past Thursday and Saturday they refused to dialyze him due to his heart rate being too low.  In ED EKG found to have junctional bradycardia and hypertensive to the 200s. He was given atropine and calcium with brief improvement in heart rate. Cardiology was consulted for possible pacemaker. On my exam is in MICU feels ok HR in 40-50 s  Denies HA CP n/V/D no SOB    PAST MEDICAL & SURGICAL HISTORY:  CHF (congestive heart failure)      CVA (cerebral vascular accident)      Seizures  last seizure 10 years ago      HTN (hypertension)      Hyperlipemia      COVID-19  october 2020      Atrial fibrillation      Former smoker      History of cocaine use  remote hx, currently sober      H/O aortic dissection  s/p repair (2010), surgery complicated by bowel ischemia s/p bowel resection and ostomy with reversal      H/O aortic valve insufficiency      Mitral regurgitation      GIB (gastrointestinal bleeding)      CAD (coronary artery disease)  s/p PCI 1998  and CABG x 2 11/2020      Chronic atrial fibrillation      Aneurysm of artery of upper extremity      Anemia of chronic disease      End stage renal disease      Myocardial infarction      COVID-19 vaccine series completed      Port-A-Cath in place      H/O colectomy      Status post double vessel coronary artery bypass  11/2020 Dr Butler      S/P AVR (aortic valve replacement)  (t)AVR 11/2020 Dr Butler      S/P MVR (mitral valve replacement)  (t)MVR 11/2020 Dr Butler      H/O aortic dissection  Type A; emergent repair 2010      AV fistula  LUE      History of renal transplant      Presence of Watchman left atrial appendage closure device      FAMILY HISTORY:  FH: hypertension    Family history of cardiac disorder (Mother)  mother    NC    Social History:Non smoker    MEDICATIONS  (STANDING):  chlorhexidine 2% Cloths 1 Application(s) Topical <User Schedule>  heparin   Injectable 5000 Unit(s) SubCutaneous every 12 hours  pantoprazole    Tablet 40 milliGRAM(s) Oral before breakfast  tamsulosin 0.4 milliGRAM(s) Oral at bedtime    MEDICATIONS  (PRN):  diphenoxylate/atropine 1 Tablet(s) Oral every 6 hours PRN Diarrhea  hydrALAZINE 10 milliGRAM(s) Oral every 4 hours PRN Systolic blood pressure > 180   Meds reviewed    Allergies    penicillin (Other)    Vital Signs Last 24 Hrs  T(C): 36.1 (14 Apr 2025 08:00), Max: 36.7 (13 Apr 2025 22:00)  T(F): 97 (14 Apr 2025 08:00), Max: 98 (13 Apr 2025 22:00)  HR: 65 (14 Apr 2025 08:00) (30 - 65)  BP: 160/57 (14 Apr 2025 08:00) (142/45 - 194/66)  BP(mean): 89 (14 Apr 2025 08:00) (71 - 105)  RR: 15 (14 Apr 2025 08:00) (10 - 18)  SpO2: 97% (14 Apr 2025 08:00) (92% - 100%)    Parameters below as of 14 Apr 2025 08:00  Patient On (Oxygen Delivery Method): room air      Daily Height in cm: 167.64 (13 Apr 2025 19:10)    Daily     PHYSICAL EXAM:    GENERAL: NAD  HEAD:  NCA  EYES: EOMI  NECK: Supple  NERVOUS SYSTEM:  Alert & Oriented X3  CHEST/LUNG: Clear to percussion bilaterally  HEART: Suresh cardic  ABDOMEN: Soft, Nontender, Nondistended; +BS  EXTREMITIES:  no edema, LUE AVF + thrill - swollen not using     LABS:                        9.7    3.77  )-----------( 88       ( 14 Apr 2025 03:12 )             31.6     04-14    140  |  101  |  104.9[H]  ----------------------------<  82  5.1   |  20.0[L]  |  8.17[H]    Ca    9.1      14 Apr 2025 03:12  Phos  7.8     04-14  Mg     2.2     04-14    TPro  6.7  /  Alb  3.9  /  TBili  0.8  /  DBili  x   /  AST  14  /  ALT  11  /  AlkPhos  90  04-14      Urinalysis Basic - ( 14 Apr 2025 03:12 )    Color: x / Appearance: x / SG: x / pH: x  Gluc: 82 mg/dL / Ketone: x  / Bili: x / Urobili: x   Blood: x / Protein: x / Nitrite: x   Leuk Esterase: x / RBC: x / WBC x   Sq Epi: x / Non Sq Epi: x / Bacteria: x      Magnesium: 2.2 mg/dL (04-14 @ 03:12)  Phosphorus: 7.8 mg/dL (04-14 @ 03:12)  Magnesium: 2.0 mg/dL (04-13 @ 19:41)  Phosphorus: 7.5 mg/dL (04-13 @ 19:41)          RADIOLOGY & ADDITIONAL TESTS:

## 2025-04-14 NOTE — PATIENT PROFILE ADULT - FALL HARM RISK - HARM RISK INTERVENTIONS

## 2025-04-14 NOTE — CHART NOTE - NSCHARTNOTEFT_GEN_A_CORE
Vascular surgery requested patient get dialysis through his fistula so permacath could be removed so patient can have pacemaker placed on right side. Asked dialysis nurse if she could change the access. Patient stated he refused to have dialysis through the fistula and would only do it through the catheter. Risks explained to patient who vocalized understanding.

## 2025-04-14 NOTE — CONSULT NOTE ADULT - SUBJECTIVE AND OBJECTIVE BOX
Vascular Surgery Brief consult note    Labs/vitals reviewed  Admitted for asymptomatic bradycardia  Hist of ESRD, HD currently via right IJ PermCath  pERMAcath was placed on last admission due to infiltrated left upper ext Brachial axillary AV access site    -- Left Upper ext hematoma/infiltration resolved  - Palpable thrill noted  -- recommend that the left AV access site bed accessed in the future.  Permacath to be used today due to patient being anxious regarding accessing the left upper ext   -- will forward above to the surgical attending   Vascular Surgery Brief consult note    Labs/vitals reviewed  Admitted for asymptomatic bradycardia  Hist of ESRD, HD currently via right IJ PermCath  pERMAcath was placed on last admission due to infiltrated left upper ext Brachial axillary AV access site    -- Left Upper ext hematoma/infiltration resolved  - Palpable thrill noted  - will obtain duplex of graft to assess for resolving hematoma/assess flow prior to HD today  -if graft flows look good and hematoma resolved, use graft for HD today, then can remove permacath  -recommend pacemaker to be placed on R side, if placed on left, anticipate that patient will have graft/HD issues with central stenosis in the future  -discussed with attending surgeon

## 2025-04-14 NOTE — ED ADULT NURSE NOTE - IN ACCORDANCE WITH [place] LAW, WE OFFER EVERY PATIENT A HEPATITIS C TEST. WOULD YOU LIKE TO BE TESTED TODAY?
----- Message from Donna Harris PA-C sent at 9/19/2022  9:14 AM CDT -----  Multilevel degenerative changes with severe left foraminal stenosis at C6-7. Can have her see neurosurgery for their opinion  
Lm to call back  
Pt calling back    Telephone Information:   [facility] [phone]       
Pt notified of results.  [phone].  
Opt out

## 2025-04-14 NOTE — CONSULT NOTE ADULT - ATTENDING COMMENTS
spoke to Mr Placido , he is agreeable to use AVG , then hopefully we can take out the permacath , no pacemaker on left side this could lead to subclavian vein stenosis , and severe swelling of LUE

## 2025-04-14 NOTE — CONSULT NOTE ADULT - NS ATTEND AMEND GEN_ALL_CORE FT
Patient seen and examined at bedside with no complaints of dizziness and lightheadedness with junctional bradycardia vs regularized Afib    65 y/o male with hx of ESRD on HD (Tu, Th, Sat), Afib previously not on AC due to subdural hematoma, now s/p Watchman 8/2024, type A aortic dissection s/p repair 11/2020 c/b ischemic bowel s/p resection, CAD s/p re-op sternotomy CABG x2 and bioprosthetic  AVR/ MV repair, former substance use disorder, MV endocarditis 2023, seizures who presents with bradycardia.    1) Junctional bradycardia vs regularized Afib   2) Afib s/p Watchman on ASA   3) CAD s/p re-op sternotomy CABG x2 and bioprosthetic  AVR/ MV repair    - patient referred for bradycardia, junctional rhythm vs regularized Afib, HR in the 30s and appears to be asymptomatic   - TSH, 11 with HS trops 127->119 Patient seen and examined at bedside with no complaints of dizziness and lightheadedness with junctional bradycardia vs regularized Afib    63 y/o male with hx of ESRD on HD (Tu, Th, Sat), Afib previously not on AC due to subdural hematoma, now s/p Watchman 8/2024, type A aortic dissection s/p repair 11/2020 c/b ischemic bowel s/p resection, CAD s/p re-op sternotomy CABG x2 and bioprosthetic  AVR/ MV repair, former substance use disorder, MV endocarditis 2023, seizures who presents with bradycardia.    1) Junctional bradycardia vs regularized Afib   2) Afib s/p Watchman on ASA   3) CAD s/p re-op sternotomy CABG x2 and bioprosthetic  AVR/ MV repair    - patient referred for bradycardia, junctional rhythm vs regularized Afib, HR in the 30s and appears to be asymptomatic   - TSH, 11 with HS trops 127->119, not consistent with ischemia or myocardial injury,   - EP consulted and likely plan for PPM, hold all AVN blockers and continue with telemetry monitoring   - follow up thyroid studies   - no further work up from general cardiology standpoint    Thania Moody D.O. [facility]  Cardiology/Vascular Cardiology -[facility] Card[facility]   Telephone # [phone]

## 2025-04-14 NOTE — H&P ADULT - ASSESSMENT
ASSESSMENT  ROZ GIBSON is a 64y male with PMH ESRD HD T,Th,S (follows with [facility]), Afib (not on AC 2/2 prior GIB now s/p watchman), CAD s/p CABG, former substance use disorder, Mitral valve infective endocarditis (2023), aortic and mitral valve replacement, Type A aortic dissection s/p repair c/b ischemic bowel s/p resection, seizure disorder, and recent hospital admission  for PNA (01/2025) in the hospital for 1d MICU consulted for     ASSESSMENT  ROZ GIBSON is a 64y male with PMH ESRD HD T,Th,S (follows with [facility]), Afib (not on AC 2/2 prior GIB now s/p watchman), CAD s/p CABG, former substance use disorder, Mitral valve infective endocarditis (2023), aortic and mitral valve replacement, Type A aortic dissection s/p repair c/b ischemic bowel s/p resection, seizure disorder, and recent hospital admission  for PNA (01/2025) in the hospital for 1d MICU consulted for    #Bradycardia  #Hemodialysis    -Admit to MICU for continuous monitoring with possible TVP placement. Please keep pads on patient with zol at bedside and atropine at bedside.  -Hold all AV niki blockers  -F/U TTE  -F/U Cards, EP consult, evaluation for possible PPM  -F/U nephro for HD today  -F/U blood cultures for possible infectious etiology, no leukocytosis, afebrile, however given hx of endocarditis will r/o      Case discussed with MICU attending Dr. Jeffery. Guarded prognosis. Upon my evaluation, this patient had a high probability of imminent or life-threatening deterioration due to critical condition.

## 2025-04-15 ENCOUNTER — APPOINTMENT (OUTPATIENT)
Dept: VASCULAR SURGERY | Facility: CLINIC | Age: 65
End: 2025-04-15

## 2025-04-15 LAB
ALBUMIN SERPL ELPH-MCNC: 3.9 G/DL — SIGNIFICANT CHANGE UP (ref 3.3–5.2)
ALP SERPL-CCNC: 94 U/L — SIGNIFICANT CHANGE UP (ref 40–120)
ALT FLD-CCNC: 11 U/L — SIGNIFICANT CHANGE UP
AMPHET UR-MCNC: NEGATIVE — SIGNIFICANT CHANGE UP
ANION GAP SERPL CALC-SCNC: 13 MMOL/L — SIGNIFICANT CHANGE UP (ref 5–17)
APPEARANCE UR: CLEAR — SIGNIFICANT CHANGE UP
AST SERPL-CCNC: 16 U/L — SIGNIFICANT CHANGE UP
BACTERIA # UR AUTO: NEGATIVE /HPF — SIGNIFICANT CHANGE UP
BARBITURATES UR SCN-MCNC: NEGATIVE — SIGNIFICANT CHANGE UP
BENZODIAZ UR-MCNC: POSITIVE
BILIRUB SERPL-MCNC: 1.3 MG/DL — SIGNIFICANT CHANGE UP (ref 0.4–2)
BILIRUB UR-MCNC: NEGATIVE — SIGNIFICANT CHANGE UP
BUN SERPL-MCNC: 51.2 MG/DL — HIGH (ref 8–20)
CALCIUM SERPL-MCNC: 8.6 MG/DL — SIGNIFICANT CHANGE UP (ref 8.4–10.5)
CHLORIDE SERPL-SCNC: 101 MMOL/L — SIGNIFICANT CHANGE UP (ref 96–108)
CO2 SERPL-SCNC: 27 MMOL/L — SIGNIFICANT CHANGE UP (ref 22–29)
COCAINE METAB.OTHER UR-MCNC: NEGATIVE — SIGNIFICANT CHANGE UP
COLOR SPEC: YELLOW — SIGNIFICANT CHANGE UP
CREAT SERPL-MCNC: 5.59 MG/DL — HIGH (ref 0.5–1.3)
DIFF PNL FLD: NEGATIVE — SIGNIFICANT CHANGE UP
EGFR: 11 ML/MIN/1.73M2 — LOW
EGFR: 11 ML/MIN/1.73M2 — LOW
EPI CELLS # UR: PRESENT
FENTANYL UR QL SCN: NEGATIVE — SIGNIFICANT CHANGE UP
GLUCOSE SERPL-MCNC: 97 MG/DL — SIGNIFICANT CHANGE UP (ref 70–99)
GLUCOSE UR QL: NEGATIVE MG/DL — SIGNIFICANT CHANGE UP
HCT VFR BLD CALC: 30.2 % — LOW (ref 39–50)
HGB BLD-MCNC: 9.8 G/DL — LOW (ref 13–17)
IMMATURE PLATELET FRACTION #: 3.5 K/UL — LOW (ref 3.9–12.5)
IMMATURE PLATELET FRACTION %: 4.6 % — SIGNIFICANT CHANGE UP (ref 1.6–7.1)
KETONES UR-MCNC: NEGATIVE MG/DL — SIGNIFICANT CHANGE UP
LEUKOCYTE ESTERASE UR-ACNC: NEGATIVE — SIGNIFICANT CHANGE UP
MAGNESIUM SERPL-MCNC: 2.1 MG/DL — SIGNIFICANT CHANGE UP (ref 1.6–2.6)
MCHC RBC-ENTMCNC: 31.3 PG — SIGNIFICANT CHANGE UP (ref 27–34)
MCHC RBC-ENTMCNC: 32.5 G/DL — SIGNIFICANT CHANGE UP (ref 32–36)
MCV RBC AUTO: 96.5 FL — SIGNIFICANT CHANGE UP (ref 80–100)
METHADONE UR-MCNC: NEGATIVE — SIGNIFICANT CHANGE UP
NITRITE UR-MCNC: NEGATIVE — SIGNIFICANT CHANGE UP
NRBC # BLD AUTO: 0 K/UL — SIGNIFICANT CHANGE UP (ref 0–0)
NRBC # FLD: 0 K/UL — SIGNIFICANT CHANGE UP (ref 0–0)
NRBC BLD AUTO-RTO: 0 /100 WBCS — SIGNIFICANT CHANGE UP (ref 0–0)
OPIATES UR-MCNC: NEGATIVE — SIGNIFICANT CHANGE UP
PCP SPEC-MCNC: SIGNIFICANT CHANGE UP
PCP UR-MCNC: NEGATIVE — SIGNIFICANT CHANGE UP
PH UR: 6 — SIGNIFICANT CHANGE UP (ref 5–8)
PHOSPHATE SERPL-MCNC: 5 MG/DL — HIGH (ref 2.4–4.7)
PLATELET # BLD AUTO: 75 K/UL — LOW (ref 150–400)
PMV BLD: 11.9 FL — SIGNIFICANT CHANGE UP (ref 7–13)
POTASSIUM SERPL-MCNC: 3.8 MMOL/L — SIGNIFICANT CHANGE UP (ref 3.5–5.3)
POTASSIUM SERPL-SCNC: 3.8 MMOL/L — SIGNIFICANT CHANGE UP (ref 3.5–5.3)
PROT SERPL-MCNC: 6.7 G/DL — SIGNIFICANT CHANGE UP (ref 6.6–8.7)
PROT UR-MCNC: 100 MG/DL
RBC # BLD: 3.13 M/UL — LOW (ref 4.2–5.8)
RBC # FLD: 13.8 % — SIGNIFICANT CHANGE UP (ref 10.3–14.5)
RBC CASTS # UR COMP ASSIST: 2 /HPF — SIGNIFICANT CHANGE UP (ref 0–4)
SODIUM SERPL-SCNC: 141 MMOL/L — SIGNIFICANT CHANGE UP (ref 135–145)
SP GR SPEC: 1.01 — SIGNIFICANT CHANGE UP (ref 1–1.03)
THC UR QL: POSITIVE
UROBILINOGEN FLD QL: 0.2 MG/DL — SIGNIFICANT CHANGE UP (ref 0.2–1)
WBC # BLD: 4.45 K/UL — SIGNIFICANT CHANGE UP (ref 3.8–10.5)
WBC # FLD AUTO: 4.45 K/UL — SIGNIFICANT CHANGE UP (ref 3.8–10.5)
WBC UR QL: 0 /HPF — SIGNIFICANT CHANGE UP (ref 0–5)

## 2025-04-15 PROCEDURE — 99233 SBSQ HOSP IP/OBS HIGH 50: CPT | Mod: GC

## 2025-04-15 PROCEDURE — 71045 X-RAY EXAM CHEST 1 VIEW: CPT | Mod: 26

## 2025-04-15 PROCEDURE — 33274 TCAT INSJ/RPL PERM LDLS PM: CPT

## 2025-04-15 PROCEDURE — 99152 MOD SED SAME PHYS/QHP 5/>YRS: CPT

## 2025-04-15 PROCEDURE — 99222 1ST HOSP IP/OBS MODERATE 55: CPT | Mod: GC

## 2025-04-15 PROCEDURE — 93010 ELECTROCARDIOGRAM REPORT: CPT

## 2025-04-15 RX ORDER — KETOROLAC TROMETHAMINE 30 MG/ML
15 INJECTION, SOLUTION INTRAMUSCULAR; INTRAVENOUS ONCE
Refills: 0 | Status: DISCONTINUED | OUTPATIENT
Start: 2025-04-15 | End: 2025-04-15

## 2025-04-15 RX ORDER — CARVEDILOL 3.12 MG/1
6.25 TABLET, FILM COATED ORAL EVERY 12 HOURS
Refills: 0 | Status: DISCONTINUED | OUTPATIENT
Start: 2025-04-15 | End: 2025-04-16

## 2025-04-15 RX ORDER — SEVELAMER HYDROCHLORIDE 800 MG/1
1600 TABLET ORAL
Refills: 0 | Status: DISCONTINUED | OUTPATIENT
Start: 2025-04-15 | End: 2025-04-16

## 2025-04-15 RX ORDER — ATORVASTATIN CALCIUM 80 MG/1
40 TABLET, FILM COATED ORAL AT BEDTIME
Refills: 0 | Status: DISCONTINUED | OUTPATIENT
Start: 2025-04-15 | End: 2025-04-16

## 2025-04-15 RX ORDER — TORSEMIDE 10 MG
20 TABLET ORAL DAILY
Refills: 0 | Status: DISCONTINUED | OUTPATIENT
Start: 2025-04-15 | End: 2025-04-16

## 2025-04-15 RX ORDER — CLINDAMYCIN PHOSPHATE 150 MG/ML
600 VIAL (ML) INJECTION EVERY 8 HOURS
Refills: 0 | Status: COMPLETED | OUTPATIENT
Start: 2025-04-15 | End: 2025-04-16

## 2025-04-15 RX ORDER — LEVETIRACETAM 10 MG/ML
500 INJECTION, SOLUTION INTRAVENOUS
Refills: 0 | Status: DISCONTINUED | OUTPATIENT
Start: 2025-04-15 | End: 2025-04-16

## 2025-04-15 RX ORDER — ISOSORBIDE MONONITRATE 60 MG/1
60 TABLET, EXTENDED RELEASE ORAL DAILY
Refills: 0 | Status: DISCONTINUED | OUTPATIENT
Start: 2025-04-15 | End: 2025-04-16

## 2025-04-15 RX ORDER — AMLODIPINE BESYLATE 10 MG/1
10 TABLET ORAL AT BEDTIME
Refills: 0 | Status: DISCONTINUED | OUTPATIENT
Start: 2025-04-15 | End: 2025-04-16

## 2025-04-15 RX ADMIN — DIPHENOXYLATE HYDROCHLORIDE AND ATROPINE SULFATE 1 TABLET(S): .025; 2.5 TABLET ORAL at 01:52

## 2025-04-15 RX ADMIN — Medication 1 APPLICATION(S): at 05:42

## 2025-04-15 RX ADMIN — AMLODIPINE BESYLATE 10 MILLIGRAM(S): 10 TABLET ORAL at 21:38

## 2025-04-15 RX ADMIN — HEPARIN SODIUM 5000 UNIT(S): 1000 INJECTION INTRAVENOUS; SUBCUTANEOUS at 05:34

## 2025-04-15 RX ADMIN — Medication 10 MILLIGRAM(S): at 15:12

## 2025-04-15 RX ADMIN — KETOROLAC TROMETHAMINE 15 MILLIGRAM(S): 30 INJECTION, SOLUTION INTRAMUSCULAR; INTRAVENOUS at 17:00

## 2025-04-15 RX ADMIN — ATORVASTATIN CALCIUM 40 MILLIGRAM(S): 80 TABLET, FILM COATED ORAL at 21:38

## 2025-04-15 RX ADMIN — Medication 40 MILLIGRAM(S): at 05:34

## 2025-04-15 RX ADMIN — Medication 100 MILLIGRAM(S): at 22:18

## 2025-04-15 RX ADMIN — TAMSULOSIN HYDROCHLORIDE 0.4 MILLIGRAM(S): 0.4 CAPSULE ORAL at 21:38

## 2025-04-15 NOTE — PROGRESS NOTE ADULT - SUBJECTIVE AND OBJECTIVE BOX
Patient is a 64y old  Male who presents with a chief complaint of Bradycardia (15 Apr 2025 09:40)      INTERVAL HPI/OVERNIGHT EVENTS:   Seen and examined bedside. Feels well. Denies Chest pain, abdominal pain, shortness of breath, fevers, chills, nausea, vomiting, diarrhea, dysuria, headache, dizziness.   Patient taken for pacemaker today.   States he is willing to use fistula in future    ICU Vital Signs Last 24 Hrs  T(C): 36.7 (15 Apr 2025 09:50), Max: 37.3 (15 Apr 2025 08:00)  T(F): 98.1 (15 Apr 2025 09:50), Max: 99.1 (15 Apr 2025 08:00)  HR: 38 (15 Apr 2025 09:50) (33 - 59)  BP: 131/82 (15 Apr 2025 09:50) (102/72 - 189/77)  BP(mean): 92 (15 Apr 2025 09:00) (74 - 115)  ABP: --  ABP(mean): --  RR: 16 (15 Apr 2025 09:50) (13 - 22)  SpO2: 96% (15 Apr 2025 09:50) (87% - 100%)    O2 Parameters below as of 15 Apr 2025 09:50  Patient On (Oxygen Delivery Method): room air          I&O's Summary    14 Apr 2025 07:01  -  15 Apr 2025 07:00  --------------------------------------------------------  IN: 100 mL / OUT: 1275 mL / NET: -1175 mL          LABS:                        9.8    4.45  )-----------( 75       ( 15 Apr 2025 02:58 )             30.2     04-15    141  |  101  |  51.2[H]  ----------------------------<  97  3.8   |  27.0  |  5.59[H]    Ca    8.6      15 Apr 2025 02:58  Phos  5.0     04-15  Mg     2.1     04-15    TPro  6.7  /  Alb  3.9  /  TBili  1.3  /  DBili  x   /  AST  16  /  ALT  11  /  AlkPhos  94  04-15      Urinalysis Basic - ( 15 Apr 2025 02:58 )    Color: x / Appearance: x / SG: x / pH: x  Gluc: 97 mg/dL / Ketone: x  / Bili: x / Urobili: x   Blood: x / Protein: x / Nitrite: x   Leuk Esterase: x / RBC: x / WBC x   Sq Epi: x / Non Sq Epi: x / Bacteria: x      CAPILLARY BLOOD GLUCOSE            RADIOLOGY & ADDITIONAL TESTS:    Consultant(s) Notes Reviewed:  [x ] YES  [ ] NO    MEDICATIONS  (STANDING):  chlorhexidine 2% Cloths 1 Application(s) Topical <User Schedule>  heparin   Injectable 5000 Unit(s) SubCutaneous every 12 hours  pantoprazole    Tablet 40 milliGRAM(s) Oral before breakfast  tamsulosin 0.4 milliGRAM(s) Oral at bedtime    MEDICATIONS  (PRN):  diphenoxylate/atropine 1 Tablet(s) Oral every 6 hours PRN Diarrhea  hydrALAZINE 10 milliGRAM(s) Oral every 4 hours PRN Systolic blood pressure > 180      PHYSICAL EXAM:  GENERAL:   HEAD:  Atraumatic, Normocephalic  EYES: EOMI, PERRLA, conjunctiva and sclera clear  NECK: Supple, No JVD, Normal thyroid, no enlarged nodes  NERVOUS SYSTEM:  Alert & Awake.   CHEST/LUNG: B/L good air entry; No rales, rhonchi, or wheezing  HEART: S1S2 normal, no S3, Regular rate and rhythm; No murmurs  ABDOMEN: Soft, Nontender, Nondistended; Bowel sounds present  EXTREMITIES:  2+ Peripheral Pulses, No clubbing, cyanosis, or edema  LYMPH: No lymphadenopathy noted  SKIN: No rashes or lesions    Care Discussed with Consultants/Other Providers [ x] YES  [ ] NO

## 2025-04-15 NOTE — PROGRESS NOTE ADULT - ASSESSMENT
65 y/o male with PMH of ESRD HD T,Th,S, Afib (not on AC 2/2 prior GIB now s/p watchman), CAD s/p CABG, former substance use disorder, Mitral valve infective endocarditis (2023), aortic and mitral valve replacement, Type A aortic dissection s/p repair c/b ischemic bowel s/p resection, seizure disorder, and recent hospital admission  for PNA (01/2025) presenting with asymptomatic bradycardia.    ESRD on HD TTS schedule   Was refused HD on Thurs and Sat due to Bradycardia  Had HD yest tolerated ok next dialysis [facility] will cont on MWF schedule  Still has UOP    Has LUE AVF w thrill noted Vascular eval will use AVF for HD tomorrow    Suresh cardia  Holding  AVN blocking agents  Was on Carvedilol now stopped  Noted Cards plans for PPM today    Will follow

## 2025-04-15 NOTE — DIETITIAN INITIAL EVALUATION ADULT - ORAL INTAKE PTA/DIET HISTORY
Chart and events reviewed. Pt being transferred to cath lab at time of attempted visit. RD to follow up at more appropriate time. Current weight 153 lbs, no further weight hx per chart review. Pt has been NPO since yesterday, previously on renal diet secondary to ESRD on HD. Diet education to be provided at follow up if appropriate.

## 2025-04-15 NOTE — PROGRESS NOTE ADULT - TIME BILLING
reviewing labs, notes, orders, radiographic studies, as well as counseling and coordinating care with the relevant multidisciplinary team, including with the primary and consulting providers.
chart review, patient encounter, chart documentation.  Plan discussed with patient, floor RN and PGY2 resident.

## 2025-04-15 NOTE — PROGRESS NOTE ADULT - SUBJECTIVE AND OBJECTIVE BOX
PROCEDURE(S):   AVEIR AR Atrial Leadless Pacemaker    ELECTROPHYSIOLOGIST(S): Ramin Mosqueda MD         COMPLICATIONS:  none        DISPOSITION:  observation     CONDITION: stable      Pt doing well s/p AVEIR AR Atrial Leadless Pacemaker via RFV access. Denies complaint.       MEDICATIONS  (STANDING):  chlorhexidine 2% Cloths 1 Application(s) Topical <User Schedule>  heparin   Injectable 5000 Unit(s) SubCutaneous every 12 hours  pantoprazole    Tablet 40 milliGRAM(s) Oral before breakfast  tamsulosin 0.4 milliGRAM(s) Oral at bedtime    MEDICATIONS  (PRN):  diphenoxylate/atropine 1 Tablet(s) Oral every 6 hours PRN Diarrhea  hydrALAZINE 10 milliGRAM(s) Oral every 4 hours PRN Systolic blood pressure > 180      Allergies    penicillin (Other)          Exam:   T(C): 36.7 (04-15-25 @ 09:50), Max: 37.3 (04-15-25 @ 08:00)  HR: 65  BP: 160/70  RR: 16   SpO2: 96% (04-15-25 @ 09:50) (87% - 100%)    VSS, NAD  Card: S1/S2, RRR, no m/g/r  Resp: lungs CTA b/l  Abd: S/NT/ND  Groin (right only): hemostatic suture in place; site C/D/I; no bleeding, hematoma, erythema, exudate or edema  Ext: no edema; distal pulses intact    ECG:  PENDING    Assessment:   63 y/o male ESRD HD T,Th,S, atrial fibrillation s/p ablation (PVI, CTI, Mitral line, 2/2021), LAAO with Watchman device 8/2024, type A aortic dissection s/p repair 11/2020 c/b ischemic bowel s/p resection, CAD s/p re-op sternotomy CABG x2 and bioprosthetic  AVR/ MV repair, former substance use disorder, MV endocarditis 2023, recent hospitalization 3/18 - 3/21 due to malfunctioning LUE fistula now s/p R sided IR permacath placement, who now presents to [facility] with complaints of bradycardia. Pt states his dialysis center would not perform dialysis on his this past Thursday and Saturday due to bradycardia and advised him to come to the hospital for an assessment. EKG and telemetry review at time of arrival reveals junctional bradycardia 30-40s. Pt reports no recent or remote syncopal events. Currently denies dizziness, fatigue, chest pain, SOB, palpitations, dizziness.    Now s/p uncomplicated AVEIR AR Atrial Leadless Pacemaker via RFV access.   Programmed AAIR 60bpm    - Bedrest x 6 hours, then OOB with assistance and progress as tolerated.   - Groin suture to be removed by EP service in AM.   - Can resume home Coreg  - Cont Ancef 2gm IV q 8 hours x 2 additional doses to complete 24 hour course.   - PA/Lat CXR and device check in AM.   - Pending status overnight, anticipate d/c home tomorrow with outpt f/up in 1-2 weeks.     PROCEDURE(S):   AVEIR AR Atrial Leadless Pacemaker    ELECTROPHYSIOLOGIST(S): Ramin Mosqueda MD         COMPLICATIONS:  none        DISPOSITION:  observation     CONDITION: stable      Pt doing well s/p AVEIR AR Atrial Leadless Pacemaker via RFV access. Denies complaint.       MEDICATIONS  (STANDING):  chlorhexidine 2% Cloths 1 Application(s) Topical <User Schedule>  heparin   Injectable 5000 Unit(s) SubCutaneous every 12 hours  pantoprazole    Tablet 40 milliGRAM(s) Oral before breakfast  tamsulosin 0.4 milliGRAM(s) Oral at bedtime    MEDICATIONS  (PRN):  diphenoxylate/atropine 1 Tablet(s) Oral every 6 hours PRN Diarrhea  hydrALAZINE 10 milliGRAM(s) Oral every 4 hours PRN Systolic blood pressure > 180      Allergies    penicillin (Other)          Exam:   T(C): 36.7 (04-15-25 @ 09:50), Max: 37.3 (04-15-25 @ 08:00)  HR: 65  BP: 160/70  RR: 16   SpO2: 96% (04-15-25 @ 09:50) (87% - 100%)    VSS, NAD  Card: S1/S2, RRR, no m/g/r  Resp: lungs CTA b/l  Abd: S/NT/ND  Groin (right only): hemostatic suture in place; site C/D/I; no bleeding, hematoma, erythema, exudate or edema  Ext: no edema; distal pulses intact    ECG:  PENDING    Assessment:   63 y/o male ESRD HD T,Th,S, atrial fibrillation s/p ablation (PVI, CTI, Mitral line, 2/2021), LAAO with Watchman device 8/2024, type A aortic dissection s/p repair 11/2020 c/b ischemic bowel s/p resection, CAD s/p re-op sternotomy CABG x2 and bioprosthetic  AVR/ MV repair, former substance use disorder, MV endocarditis 2023, recent hospitalization 3/18 - 3/21 due to malfunctioning LUE fistula now s/p R sided IR permacath placement, who now presents to [facility] with complaints of bradycardia. Pt states his dialysis center would not perform dialysis on his this past Thursday and Saturday due to bradycardia and advised him to come to the hospital for an assessment. EKG and telemetry review at time of arrival reveals junctional bradycardia 30-40s. Pt reports no recent or remote syncopal events. Currently denies dizziness, fatigue, chest pain, SOB, palpitations, dizziness.    Now s/p uncomplicated AVEIR AR Atrial Leadless Pacemaker via RFV access.   Programmed AAIR 60bpm    - Bedrest x 6 hours, then OOB with assistance and progress as tolerated.   - Groin suture to be removed by EP service in AM.   - Can resume home Coreg  - Cont Clindamycin 600mg IV q 8 hours x 2 additional doses to complete 24 hour course.   - PA/Lat CXR and device check in AM.   - Pending status overnight, anticipate d/c home tomorrow with outpt f/up in 1-2 weeks.     PROCEDURE(S):   AVEIR AR Atrial Leadless Pacemaker    ELECTROPHYSIOLOGIST(S): Ramin Mosqueda MD         COMPLICATIONS:  none        DISPOSITION:  observation     CONDITION: stable      Pt doing well s/p AVEIR AR Atrial Leadless Pacemaker via RFV access. Denies complaint.       MEDICATIONS  (STANDING):  chlorhexidine 2% Cloths 1 Application(s) Topical <User Schedule>  heparin   Injectable 5000 Unit(s) SubCutaneous every 12 hours  pantoprazole    Tablet 40 milliGRAM(s) Oral before breakfast  tamsulosin 0.4 milliGRAM(s) Oral at bedtime    MEDICATIONS  (PRN):  diphenoxylate/atropine 1 Tablet(s) Oral every 6 hours PRN Diarrhea  hydrALAZINE 10 milliGRAM(s) Oral every 4 hours PRN Systolic blood pressure > 180      Allergies    penicillin (Other)          Exam:   T(C): 36.7 (04-15-25 @ 09:50), Max: 37.3 (04-15-25 @ 08:00)  HR: 65  BP: 160/70  RR: 16   SpO2: 96% (04-15-25 @ 09:50) (87% - 100%)    VSS, NAD  Card: S1/S2, RRR, no m/g/r  Resp: lungs CTA b/l  Abd: S/NT/ND  Groin (right only): hemostatic suture in place; site C/D/I; no bleeding, hematoma, erythema, exudate or edema  Ext: no edema; distal pulses intact    ECG:  PENDING    Assessment:   63 y/o male ESRD HD T,Th,S, atrial fibrillation s/p ablation (PVI, CTI, Mitral line, 2/2021), LAAO with Watchman device 8/2024, type A aortic dissection s/p repair 11/2020 c/b ischemic bowel s/p resection, CAD s/p re-op sternotomy CABG x2 and bioprosthetic  AVR/ MV repair, former substance use disorder, MV endocarditis 2023, recent hospitalization 3/18 - 3/21 due to malfunctioning LUE fistula now s/p R sided IR permacath placement, who now presents to [facility] with complaints of bradycardia. Pt states his dialysis center would not perform dialysis on his this past Thursday and Saturday due to bradycardia and advised him to come to the hospital for an assessment. EKG and telemetry review at time of arrival reveals junctional bradycardia 30-40s. Pt reports no recent or remote syncopal events. Currently denies dizziness, fatigue, chest pain, SOB, palpitations, dizziness.    Now s/p uncomplicated AVEIR AR Atrial Leadless Pacemaker via RFV access.   Programmed AAIR 60bpm    - Bedrest x 6 hours, then OOB with assistance and progress as tolerated.   - Groin suture to be removed by EP service in AM.   - Can resume home Coreg  - Cont Clindamycin 600mg IV q 8 hours x 2 additional doses to complete 24 hour course.   - PA/Lat CXR and device check in AM.   - Will continue to follow        *****************Please include in discharge summary  - Bruising at the groin, sometimes extending down the leg, and/or a small lump under the skin at the groin access site is normal and will resolve within 2 – 3 weeks.   - Occasional skipped beats or palpitations that last for a few beats are common and generally resolve within 1-2 months.   - You may walk and take stairs at a regular pace.   - Do not perform any exercise more strenuous than walking for 1 week.   - Do not strain or lift heavy objects for 1 week.  - You may shower the day after the procedure.  - Do not soak in water (such as tub baths, hot tubs, swimming, etc.) for 1 week.   - You may resume all other activities the day after the procedure.  Call your doctor if:   - you notice bleeding, redness, drainage, swelling, increased tenderness or a hot sensation around the catheter insertion site.   - your temperature is greater than 100 degrees F for more than 24 hours.  - your rapid heart rhythm returns.  - you have any questions or concerns regarding the procedure.  If significant bleeding and/or a large lump (the size of a golf ball or bigger) occurs:  - Lie flat and apply continuous direct pressure just above the puncture site for at least 10 minutes  - If the issue resolves, notify your physician immediately.    - If the bleeding cannot be controlled, please seek immediate medical attention.  If you experience increased difficulty breathing or chest pain, or if you faint or have dizzy spells, please seek immediate medical attention.

## 2025-04-15 NOTE — PROGRESS NOTE ADULT - ATTENDING COMMENTS
64yoM hx ESRD on HD, recently hospitalized at [facility] for LUE AVF malfunction, now w/ permcath   Afib s/p LAAO (Watchman), admitted to MCU  for asymptomatic bradycardia due to junctional rhythm requiring PPM.  Of note, pt was ordered for heparin as VTE prophylaxis by MICU team and received a dose of it < 24hr since PPM procedure.  Heparin was d/c’ed.  Per EP, carvedilol could be resumed.  Pt receiving post procedure antibiotic and will be due to repeat CXR in AM.  Right femoral groin site assessed, site clean, dry, w/out induration, no evidence of hematoma. 64yoM hx ESRD on HD, recently hospitalized at [facility] for LUE AVF malfunction, now w/ permcath, Afib s/p LAAO (Watchman), admitted to [facility]U  for asymptomatic bradycardia due to junctional rhythm requiring PPM.  Of note, pt was ordered for heparin as VTE prophylaxis by MICU team and received a dose of it < 24hr since procedure.  Heparin as VTE ppx was d/c’ed and it should be discussed with PE when safe to resume.  Per EP, carvedilol could be resumed.  Pt receiving post procedure antibiotic and will be due to repeat CXR in AM.  Right femoral groin site assessed, site clean, dry, w/out induration, no evidence of hematoma.

## 2025-04-15 NOTE — DIETITIAN INITIAL EVALUATION ADULT - OTHER INFO
Per chart: 64y yo male with PMH ESRD on HD, Afib (not on AC 2/2 prior GIB now s/p watchman), CAD s/p CABG, former substance use disorder, Mitral valve infective endocarditis (2023), aortic and mitral valve replacement, Type A aortic dissection s/p repair c/b ischemic bowel s/p resection, seizure disorder, and recent hospital admission  for PNA (01/2025), MICU consulted for bradycardia, missed HD on thursday and saturday before admission secondary to heart rate too low. Cardiology consulted, plan for pacemaker today.  #Hemodialysis

## 2025-04-15 NOTE — PROGRESS NOTE ADULT - ASSESSMENT
64 M PMHx ESRD (HD T/Th/S right chest wall cath, LUE graft), Afib s/p ablation, LAAO (watchman), CAD s/p CABG x2, bioprosthetic AVR/MR repair, MV endocarditis 2023, type A aortic dissection s/p repair w/ ischemic bowel s/p resection, seizure disorder, hx substance abuse, recent admission 3/18-3/21 for malfunction LUE graft sent to [facility] ED from HD due to bradycardia, unable to undergo HD (x2). Admitted to MICU for HD and further monitoring of bradycardia not requiring TVP s/p PPM. Downgraded to tele/medicine for further management.     Plan:   Asymptomatic bradycardia 2/2 junctional rhythm s/p PPM   #Afib   - Refractory to atropine s/p MICU downgrade   - BCx NGTD   - 4/14 AVEIR AR Atrial Leadless Pacemaker via RFV access w/ programmed AAIR 60bpm  - Bedrest x 6 hours, then OOB with assistance and progress as tolerated.   - Groin suture to be removed by EP service in AM.   - 4/14 TTE showed EF 65, elevated PAP, wall motion abnormalities consistent with prior CAD, valvular repair  - Resumed Coreg BID at 1800   - Cont Clindamycin 600mg IV q 8 hours x 2, last dose 4/16   - EP following, planning suture removal with PA/Lat CXR and device check in AM.   - Tele     ESRD  #anemia of chronic disease  #hyperphosphatemia   - T/Th/S converted to M/W/F  - Right chest wall cath, cleared for LUE graft usage  - Strict I/Os   - Avoid nephrotoxic medications   - Nephrology following for HD coordination     HTN  - C/w amlodipine   - c/w hydralazine PRN   - Tamsulosin 20mg PO qd     CAD/AVR/MR  -     Seizure disorder    HLD  - c/w atorvastatin     Diarrhea  - c/w Lomotil     GERD  - C/w pantoprazole     DVT: no AC due to recent intervention   Diet: Renal  Dispo: pending evaluation in AM by EP and coordination w/ outpatient dialysis, 2-3 days.   64 M PMHx ESRD (HD T/Th/S right chest wall cath, LUE graft), Afib s/p ablation, LAAO (watchman), CAD s/p CABG x2, bioprosthetic AVR/MR repair, MV endocarditis 2023, type A aortic dissection s/p repair w/ ischemic bowel s/p resection, seizure disorder, hx substance abuse, recent admission 3/18-3/21 for malfunction LUE graft sent to [facility] ED from HD due to bradycardia, unable to undergo HD (x2). Admitted to MICU for HD and further monitoring of bradycardia not requiring TVP s/p PPM. Downgraded to tele/medicine for further management.     Plan:   Asymptomatic bradycardia 2/2 junctional rhythm s/p PPM   #Afib   - Refractory to atropine s/p MICU downgrade   - BCx NGTD   - 4/14 AVEIR AR Atrial Leadless Pacemaker via RFV access w/ programmed AAIR 60bpm  - Bedrest x 6 hours, then OOB with assistance and progress as tolerated.   - Groin suture to be removed by EP service in AM.   - 4/14 TTE showed EF 65, elevated PAP, wall motion abnormalities consistent with prior CAD, valvular repair  - Resumed Coreg BID at 1800   - Cont Clindamycin 600mg IV q 8 hours x 2, last dose 4/16   - EP following, planning suture removal with PA/Lat CXR and device check in AM.   - Tele     ESRD  #anemia of chronic disease  #hyperphosphatemia   - T/Th/S converted to M/W/F  - Right chest wall cath, cleared for LUE graft usage  - resumed sevelamer 1600 mg PO TID   - Strict I/Os   - Avoid nephrotoxic medications   - Nephrology following for HD   - Vascular following for LUE graft, cleared for use     HTN/CAD/AVR/MR   - Restart BP meds gradually   - C/w amlodipine   - c/w hydralazine PRN   - restarted Torsemide 20mg PO qd, Imdur 60 mg PO qd, clonidine 0.3 mg PO BID   - If BP WNL restart hydralazine PRN to 100mg PO BID tomorrow     Seizure disorder  - Restarted Keppra 500mg PO BID     HLD  - c/w atorvastatin     Diarrhea  - c/w Lomotil     GERD  - C/w pantoprazole     DVT: no AC due to recent intervention   Diet: Renal  Dispo: pending evaluation in AM by EP and coordination w/ outpatient dialysis, 2-3 days.   64 M PMHx ESRD (HD T/Th/S right chest wall cath, LUE graft), Afib s/p ablation, LAAO (watchman), CAD s/p CABG x2, bioprosthetic AVR/MR repair, MV endocarditis 2023, type A aortic dissection s/p repair w/ ischemic bowel s/p resection, seizure disorder, hx substance abuse, recent admission 3/18-3/21 for malfunction LUE graft sent to [facility] ED from HD due to bradycardia, unable to undergo HD (x2). Admitted to MICU for HD and further monitoring of bradycardia not requiring TVP s/p PPM. Downgraded to tele/medicine for further management.     Plan:   Asymptomatic bradycardia 2/2 junctional rhythm s/p PPM   #Afib   - Refractory to atropine s/p MICU downgrade   - BCx NGTD   - 4/14 AVEIR AR Atrial Leadless Pacemaker via RFV access w/ programmed AAIR 60bpm  - Bedrest x 6 hours, then OOB with assistance and progress as tolerated.   - Groin suture to be removed by EP service in AM.   - 4/14 TTE showed EF 65, elevated PAP, wall motion abnormalities consistent with prior CAD, valvular repair, no signs of infections/vegetations   - Resumed Coreg BID at 1800   - Cont Clindamycin 600mg IV q 8 hours x 2, last dose 4/16   - EP following, planning suture removal with PA/Lat CXR and device check in AM.   - Tele     ESRD  #anemia of chronic disease  #hyperphosphatemia   - T/Th/S converted to M/W/F  - Right chest wall cath, cleared for LUE graft usage  - resumed sevelamer 1600 mg PO TID   - Strict I/Os   - Avoid nephrotoxic medications   - Nephrology following for HD   - Vascular following for LUE graft, cleared for use     HTN/CAD/AVR/MR   - Restart BP meds gradually   - C/w amlodipine   - c/w hydralazine PRN   - restarted Torsemide 20mg PO qd, Imdur 60 mg PO qd, clonidine 0.3 mg PO BID   - If BP WNL restart hydralazine PRN to 100mg PO BID tomorrow     Seizure disorder  - Restarted Keppra 500mg PO BID     HLD  - c/w atorvastatin     Diarrhea  - c/w Lomotil     GERD  - C/w pantoprazole     DVT: no AC due to recent intervention   Diet: Renal  Dispo: pending evaluation in AM by EP and coordination w/ outpatient dialysis, 2-3 days.   64 M PMHx ESRD (HD T/Th/S right chest wall cath, LUE graft), Afib s/p ablation, LAAO (watchman), CAD s/p CABG x2, bioprosthetic AVR/MR repair, MV endocarditis 2023, type A aortic dissection s/p repair w/ ischemic bowel s/p resection, seizure disorder, hx substance abuse, recent admission 3/18-3/21 for malfunction LUE graft sent to [facility] ED from HD due to bradycardia, unable to undergo HD (x2). Admitted to MICU for HD and further monitoring of bradycardia not requiring TVP s/p PPM. Downgraded to tele/medicine for further management.     Plan:   Asymptomatic bradycardia 2/2 junctional rhythm s/p PPM   #Afib   - Refractory to atropine s/p MICU downgrade   - BCx NGTD   - 4/14 AVEIR AR Atrial Leadless Pacemaker via RFV access w/ programmed AAIR 60bpm  - Bedrest x 6 hours, then OOB with assistance and progress as tolerated.   - Groin suture to be removed by EP service in AM.   - 4/14 TTE showed EF 65, elevated PAP, wall motion abnormalities consistent with prior CAD, valvular repair, no signs of infections/vegetations   - Resumed Coreg BID at 1800   - Cont Clindamycin 600mg IV q 8 hours x 2, last dose 4/16   - EP following, planning suture removal with PA/Lat CXR and device check in AM.   - Tele     ESRD  #anemia of chronic disease  #hyperphosphatemia   - T/Th/S converted to M/W/F  - Right chest wall cath, cleared for LUE graft usage  - resumed sevelamer 1600 mg PO TID   - Strict I/Os   - Avoid nephrotoxic medications   - Nephrology following for HD   - Vascular following for LUE graft, cleared for use     HTN/CAD/AVR/MR   - Restart BP meds gradually   - C/w amlodipine   - DC'd hydralazine PRN   - restarted Torsemide 20mg PO qd, Imdur 60 mg PO qd, clonidine 0.3 mg PO BID   - If BP WNL restart hydralazine PRN to 100mg PO BID tomorrow     Seizure disorder  - Restarted Keppra 500mg PO BID     HLD  - c/w atorvastatin     Diarrhea  - c/w Lomotil     GERD  - C/w pantoprazole     DVT: no AC due to recent intervention   Diet: Renal  Dispo: pending evaluation in AM by EP and coordination w/ outpatient dialysis, 2-3 days.   64 M PMHx ESRD (HD T/Th/S right chest wall cath, LUE graft), Afib s/p ablation, LAAO (watchman), CAD s/p CABG x2, bioprosthetic AVR/MR repair, MV endocarditis 2023, type A aortic dissection s/p repair w/ ischemic bowel s/p resection, seizure disorder, hx substance abuse, recent admission 3/18-3/21 for malfunction LUE graft sent to [facility] ED from HD due to bradycardia, unable to undergo HD (x2). Admitted to MICU for HD and further monitoring of bradycardia not requiring TVP s/p PPM. Downgraded to tele/medicine for further management.     Plan:   Asymptomatic bradycardia 2/2 junctional rhythm s/p PPM   - Refractory to atropine s/p MICU downgrade   - BCx NGTD   - 4/14 AVEIR AR Atrial Leadless Pacemaker via RFV access w/ programmed AAIR 60bpm  - Bedrest x 6 hours, then OOB with assistance and progress as tolerated.   - Groin suture to be removed by EP service in AM.   - 4/14 TTE showed EF 65, elevated PAP, wall motion abnormalities consistent with prior CAD, valvular repair, no signs of infections/vegetations   - Resumed Coreg BID at 1800   - Cont Clindamycin 600mg IV q 8 hours x 2, last dose 4/16   - EP following, planning suture removal with PA/Lat CXR and device check in AM.   - Telemetry     #ESRD  #anemia of chronic disease  #hyperphosphatemia   - resumed sevelamer 1600 mg PO TID   - Nephrology following for HD   - Has R-permacath due to hx of LUE graft hematoma, but vascular following re: AV graft and cleared for use, will attempt w/ next HD session     HTN/CAD/AVR/MR   - Restart BP meds gradually   - C/w amlodipine   - DC'd hydralazine PRN   - restarted Torsemide 20mg PO qd, Imdur 60 mg PO qd, clonidine 0.3 mg PO BID   - If BP WNL restart hydralazine PRN to 100mg PO BID tomorrow     Seizure disorder  - Restarted Keppra 500mg PO BID     HLD  - c/w atorvastatin     Diarrhea due to ?IBS-D  - c/w Lomotil PRN    Hx BPH  -Flomax    GERD  - C/w pantoprazole     DVT: no AC due to recent intervention   Diet: Renal  Dispo: pending evaluation in AM by EP and coordination w/ outpatient dialysis, 2-3 days.

## 2025-04-15 NOTE — DIETITIAN INITIAL EVALUATION ADULT - ADD RECOMMEND
Diet advancement when medically feasible; DASH/TLC, Renal diet  Rx: nephro-jeanie daily to replete losses from HD  Diet education at follow up if/when appropriate

## 2025-04-15 NOTE — PROGRESS NOTE ADULT - SUBJECTIVE AND OBJECTIVE BOX
NEPHROLOGY INTERVAL HPI/OVERNIGHT EVENTS:    Examined earlier  Going for PPM today  Had dialysis yest tolerated ok     MEDICATIONS  (STANDING):  chlorhexidine 2% Cloths 1 Application(s) Topical <User Schedule>  heparin   Injectable 5000 Unit(s) SubCutaneous every 12 hours  pantoprazole    Tablet 40 milliGRAM(s) Oral before breakfast  tamsulosin 0.4 milliGRAM(s) Oral at bedtime    MEDICATIONS  (PRN):  diphenoxylate/atropine 1 Tablet(s) Oral every 6 hours PRN Diarrhea  hydrALAZINE 10 milliGRAM(s) Oral every 4 hours PRN Systolic blood pressure > 180      Allergies    penicillin (Other)    Intolerances        Vital Signs Last 24 Hrs  T(C): 36.7 (15 Apr 2025 09:50), Max: 37.3 (15 Apr 2025 08:00)  T(F): 98.1 (15 Apr 2025 09:50), Max: 99.1 (15 Apr 2025 08:00)  HR: 38 (15 Apr 2025 09:50) (33 - 59)  BP: 131/82 (15 Apr 2025 09:50) (102/72 - 189/77)  BP(mean): 92 (15 Apr 2025 09:00) (74 - 115)  RR: 16 (15 Apr 2025 09:50) (13 - 22)  SpO2: 96% (15 Apr 2025 09:50) (87% - 100%)    Parameters below as of 15 Apr 2025 09:50  Patient On (Oxygen Delivery Method): room air      Daily     Daily Weight in k.5 (2025 18:15)    PHYSICAL EXAM:  GENERAL: NAD  HEAD:  NCA  EYES: EOMI  NECK: Supple  NERVOUS SYSTEM:  Alert & Oriented X3  CHEST/LUNG: Clear to percussion bilaterally  HEART: Suresh cardic  ABDOMEN: Soft, Nontender, Nondistended; +BS  EXTREMITIES:  no edema, LUE AVF + thrill - swollen not using     LABS:                        9.8    4.45  )-----------( 75       ( 15 Apr 2025 02:58 )             30.2     04-15    141  |  101  |  51.2[H]  ----------------------------<  97  3.8   |  27.0  |  5.59[H]    Ca    8.6      15 Apr 2025 02:58  Phos  5.0     04-15  Mg     2.1     04-15    TPro  6.7  /  Alb  3.9  /  TBili  1.3  /  DBili  x   /  AST  16  /  ALT  11  /  AlkPhos  94  04-15      Urinalysis Basic - ( 15 Apr 2025 02:58 )    Color: x / Appearance: x / SG: x / pH: x  Gluc: 97 mg/dL / Ketone: x  / Bili: x / Urobili: x   Blood: x / Protein: x / Nitrite: x   Leuk Esterase: x / RBC: x / WBC x   Sq Epi: x / Non Sq Epi: x / Bacteria: x      Magnesium: 2.1 mg/dL (04-15 @ 02:58)  Phosphorus: 5.0 mg/dL (04-15 @ 02:58)  Magnesium: 1.8 mg/dL ( @ 19:00)  Phosphorus: 3.4 mg/dL ( @ 19:00)          RADIOLOGY & ADDITIONAL TESTS:

## 2025-04-15 NOTE — PROGRESS NOTE ADULT - ASSESSMENT
ROZ GIBSON is a 64y male with PMH ESRD HD T,Th,S (follows with [facility]), Afib (not on AC 2/2 prior GIB now s/p watchman), CAD s/p CABG, former substance use disorder, Mitral valve infective endocarditis (2023), aortic and mitral valve replacement, Type A aortic dissection s/p repair c/b ischemic bowel s/p resection, seizure disorder, and recent hospital admission  for PNA (01/2025). Patient presents with symptomatic junctional bradycardia is getting pacemaker 4.15    ====================== NEUROLOGY=====================  -A&Ox3  ==================== RESPIRATORY======================  -Patient saturating >92% on RA  ====================CARDIOVASCULAR==================  hydrALAZINE 10 milliGRAM(s) Oral every 4 hours PRN Systolic blood pressure > 180  Plan for wireless PM today per EP  -patient HR 30-50s   -patient asymptomatic   -patient hypertensive with PRN hydralazine.   ===================HEMATOLOGIC/ONC ===================  heparin   Injectable 5000 Unit(s) SubCutaneous every 12 hours    ===================== RENAL =========================  Continue monitoring urine output  tamsulosin 0.4 milliGRAM(s) Oral at bedtime    Avoid Nephrotoxic agents   Adjusting medications for renal  function   Replacing electrolytes as needed with Goal K> 4, PO> 3, Mg> 2  per vascular will try dialysis through AV fistula on next dialysis session.   ==================== GASTROINTESTINAL===================  diphenoxylate/atropine 1 Tablet(s) Oral every 6 hours PRN Diarrhea  pantoprazole    Tablet 40 milliGRAM(s) Oral before breakfast     diet: DASH  =======================    ENDOCRINE  =====================  -monitor sugars  ========================INFECTIOUS DISEASE================  -monitor off of antibiotics.     ____________________________________________      Care plan discussed with ICU care team, family and consultants

## 2025-04-15 NOTE — DIETITIAN INITIAL EVALUATION ADULT - PERTINENT MEDS FT
MEDICATIONS  (STANDING):  heparin   Injectable 5000 Unit(s) SubCutaneous every 12 hours  pantoprazole    Tablet 40 milliGRAM(s) Oral before breakfast    MEDICATIONS  (PRN):  diphenoxylate/atropine 1 Tablet(s) Oral every 6 hours PRN Diarrhea  hydrALAZINE 10 milliGRAM(s) Oral every 4 hours PRN Systolic blood pressure > 180

## 2025-04-15 NOTE — PROGRESS NOTE ADULT - SUBJECTIVE AND OBJECTIVE BOX
MICU DOWN GRADE NOTE      Patient is a 64y old  Male who presents with a chief complaint of Bradycardia (15 Apr 2025 14:30)      HPI:  64 M PMHx ESRD (HD T/Th/S right chest wall cath, LUE graft), Afib s/p ablation, LAAO (watchman), CAD s/p CABG x2, bioprosthetic AVR/MR repair, MV endocarditis 2023, type A aortic dissection s/p repair w/ ischemic bowel s/p resection, seizure disorder, hx substance abuse, recent admission 3/18-3/21 for malfunction LUE graft sent to [facility] ED from HD due to bradycardia, unable to undergo HD (x2). In ED Patient was bradycardic to 30s, asymptomatic refractory to atropine and calcium. EKG showed junctional bradycardia. Patient then became hypertensive. Admitted to MICU for HD and further monitoring of bradycardia not requiring TVP. Infectious workup ordered due to hx of endocarditis. Cardiology, EP, nephrology consulted.     INTERVAL HPI/OVERNIGHT EVENTS:    Patient went for atrial pacemaker placement 4/15 via RVF access. Patient placed on MWF HD schedule, last dialysis 4/14. Heparin DVT prophylaxis started on MICU downgrade, received dose 1x at 1500. At time exam has no complaints, no bleeding/bruising at groin site, wondering when he could go home.     REVIEW OF SYSTEMS:  CONSTITUTIONAL: No fever, chills  HEENT:  No blurry vision No sinus or throat pain  NECK: No pain or stiffness  RESPIRATORY: No cough, wheezing, chills or hemoptysis; No shortness of breath  CARDIOVASCULAR: No chest pain, palpitations  GASTROINTESTINAL: No abdominal pain. No nausea, vomiting, or diarrhea  GENITOURINARY: No dysuria  NEUROLOGICAL: No HA, No focal weakness  SKIN: No itching, burning, rashes, or lesions   MUSCULOSKELETAL: No joint pain or swelling; No muscle, back, or extremity pain    MEDICATIONS:  amLODIPine   Tablet 10 milliGRAM(s) Oral at bedtime  atorvastatin 40 milliGRAM(s) Oral at bedtime  carvedilol 6.25 milliGRAM(s) Oral every 12 hours  chlorhexidine 2% Cloths 1 Application(s) Topical <User Schedule>  clindamycin IVPB 600 milliGRAM(s) IV Intermittent every 8 hours  diphenoxylate/atropine 1 Tablet(s) Oral every 6 hours PRN  heparin   Injectable 5000 Unit(s) SubCutaneous every 12 hours  hydrALAZINE 10 milliGRAM(s) Oral every 4 hours PRN  pantoprazole    Tablet 40 milliGRAM(s) Oral before breakfast  tamsulosin 0.4 milliGRAM(s) Oral at bedtime      T(C): 36.7 (04-15-25 @ 21:13), Max: 37.3 (04-15-25 @ 08:00)  HR: 65 (04-15-25 @ 21:13) (33 - 66)  BP: 155/53 (04-15-25 @ 21:13) (102/72 - 192/78)  RR: 18 (04-15-25 @ 21:13) (11 - 18)  SpO2: 94% (04-15-25 @ 21:13) (87% - 100%)  Wt(kg): --Vital Signs Last 24 Hrs  T(C): 36.7 (15 Apr 2025 21:13), Max: 37.3 (15 Apr 2025 08:00)  T(F): 98 (15 Apr 2025 21:13), Max: 99.1 (15 Apr 2025 08:00)  HR: 65 (15 Apr 2025 21:13) (33 - 66)  BP: 155/53 (15 Apr 2025 21:13) (102/72 - 192/78)  BP(mean): 92 (15 Apr 2025 09:00) (74 - 94)  RR: 18 (15 Apr 2025 21:13) (11 - 18)  SpO2: 94% (15 Apr 2025 21:13) (87% - 100%)    Parameters below as of 15 Apr 2025 21:13  Patient On (Oxygen Delivery Method): room air        PHYSICAL EXAM:  GENERAL: NAD, well-groomed, well-developed  HEAD:  Atraumatic, Normocephalic  EYES: EOMI, PERRLA, conjunctiva and sclera clear  ENMT:  Moist mucous membranes  NECK: Supple, No JVD,  CHEST/LUNG: Clear to auscultation bilaterally; No rales, rhonchi, wheezing, or rubs  HEART: Regular rate and rhythm; No murmurs, rubs, or gallops  ABDOMEN: Soft, Nontender, Nondistended; Bowel sounds present  NEURO: Alert & Oriented X3  EXTREMITIES: No LE edema, no calf tenderness, occlusive bandage in right groin w/o signs of bleeding/bruising/soiling, right leg in immobilization brace. LUE graft with palpable thrill.   LYMPH: No lymphadenopathy noted  SKIN: No rashes or lesions    Consultant(s) Notes Reviewed:  [x ] YES  [ ] NO  Care Discussed with Consultants/Other Providers [ x] YES  [ ] NO    LABS:                        9.8    4.45  )-----------( 75       ( 15 Apr 2025 02:58 )             30.2     04-15    141  |  101  |  51.2[H]  ----------------------------<  97  3.8   |  27.0  |  5.59[H]    Ca    8.6      15 Apr 2025 02:58  Phos  5.0     04-15  Mg     2.1     04-15    TPro  6.7  /  Alb  3.9  /  TBili  1.3  /  DBili  x   /  AST  16  /  ALT  11  /  AlkPhos  94  04-15      RADIOLOGY & ADDITIONAL TESTS:  Imaging Personally Reviewed:  [x ] YES  [ ] NO   MICU DOWN GRADE NOTE      Patient is a 64y old  Male who presents with a chief complaint of Bradycardia (15 Apr 2025 14:30)      HPI:  64 M PMHx ESRD (HD T/Th/S right chest wall cath, LUE graft), Afib s/p ablation, LAAO (watchman), CAD s/p CABG x2, bioprosthetic AVR/MR repair, MV endocarditis 2023, type A aortic dissection s/p repair w/ ischemic bowel s/p resection, seizure disorder, hx substance abuse, recent admission 3/18-3/21 for malfunction LUE graft sent to [facility] ED from HD due to bradycardia, unable to undergo HD (x2). In ED Patient was bradycardic to 30s, asymptomatic, refractory to atropine and calcium in ED. EKG showed junctional bradycardia. Patient then became hypertensive. Admitted to MICU for HD and further monitoring of bradycardia not requiring TVP. Infectious workup ordered due to hx of endocarditis. Cardiology, EP, nephrology consulted.     INTERVAL HPI/OVERNIGHT EVENTS:  Patient went for atrial pacemaker placement 4/15 via RVF access. Patient placed on MWF HD schedule, last dialysis 4/14. Heparin DVT prophylaxis started on MICU downgrade, received dose 1x at 1500. At time exam has no complaints, no bleeding/bruising at groin site, wondering when he could go home.     REVIEW OF SYSTEMS:  CONSTITUTIONAL: No fever, chills  HEENT:  No blurry vision No sinus or throat pain  NECK: No pain or stiffness  RESPIRATORY: No cough, wheezing, chills or hemoptysis; No shortness of breath  CARDIOVASCULAR: No chest pain, palpitations  GASTROINTESTINAL: No abdominal pain. No nausea, vomiting, or diarrhea  GENITOURINARY: No dysuria  NEUROLOGICAL: No HA, No focal weakness  SKIN: No itching, burning, rashes, or lesions   MUSCULOSKELETAL: No joint pain or swelling; No muscle, back, or extremity pain    MEDICATIONS:  amLODIPine   Tablet 10 milliGRAM(s) Oral at bedtime  atorvastatin 40 milliGRAM(s) Oral at bedtime  carvedilol 6.25 milliGRAM(s) Oral every 12 hours  chlorhexidine 2% Cloths 1 Application(s) Topical <User Schedule>  clindamycin IVPB 600 milliGRAM(s) IV Intermittent every 8 hours  diphenoxylate/atropine 1 Tablet(s) Oral every 6 hours PRN  heparin   Injectable 5000 Unit(s) SubCutaneous every 12 hours  hydrALAZINE 10 milliGRAM(s) Oral every 4 hours PRN  pantoprazole    Tablet 40 milliGRAM(s) Oral before breakfast  tamsulosin 0.4 milliGRAM(s) Oral at bedtime      T(C): 36.7 (04-15-25 @ 21:13), Max: 37.3 (04-15-25 @ 08:00)  HR: 65 (04-15-25 @ 21:13) (33 - 66)  BP: 155/53 (04-15-25 @ 21:13) (102/72 - 192/78)  RR: 18 (04-15-25 @ 21:13) (11 - 18)  SpO2: 94% (04-15-25 @ 21:13) (87% - 100%)  Wt(kg): --Vital Signs Last 24 Hrs  T(C): 36.7 (15 Apr 2025 21:13), Max: 37.3 (15 Apr 2025 08:00)  T(F): 98 (15 Apr 2025 21:13), Max: 99.1 (15 Apr 2025 08:00)  HR: 65 (15 Apr 2025 21:13) (33 - 66)  BP: 155/53 (15 Apr 2025 21:13) (102/72 - 192/78)  BP(mean): 92 (15 Apr 2025 09:00) (74 - 94)  RR: 18 (15 Apr 2025 21:13) (11 - 18)  SpO2: 94% (15 Apr 2025 21:13) (87% - 100%)    Parameters below as of 15 Apr 2025 21:13  Patient On (Oxygen Delivery Method): room air        PHYSICAL EXAM:  GENERAL: NAD, well-groomed, well-developed  HEAD:  Atraumatic, Normocephalic  EYES: EOMI, PERRLA, conjunctiva and sclera clear  ENMT:  Moist mucous membranes  NECK: Supple, No JVD,  CHEST/LUNG: Clear to auscultation bilaterally; No rales, rhonchi, wheezing, or rubs  HEART: Regular rate and rhythm; No murmurs, rubs, or gallops  ABDOMEN: Soft, Nontender, Nondistended; Bowel sounds present  NEURO: Alert & Oriented X3  EXTREMITIES: No LE edema, no calf tenderness, occlusive bandage in right groin w/o signs of bleeding/bruising/soiling, right leg in immobilization brace. LUE graft with palpable thrill.   LYMPH: No lymphadenopathy noted  SKIN: No rashes or lesions    Consultant(s) Notes Reviewed:  [x ] YES  [ ] NO  Care Discussed with Consultants/Other Providers [ x] YES  [ ] NO    LABS:                        9.8    4.45  )-----------( 75       ( 15 Apr 2025 02:58 )             30.2     04-15    141  |  101  |  51.2[H]  ----------------------------<  97  3.8   |  27.0  |  5.59[H]    Ca    8.6      15 Apr 2025 02:58  Phos  5.0     04-15  Mg     2.1     04-15    TPro  6.7  /  Alb  3.9  /  TBili  1.3  /  DBili  x   /  AST  16  /  ALT  11  /  AlkPhos  94  04-15      RADIOLOGY & ADDITIONAL TESTS:  Imaging Personally Reviewed:  [x ] YES  [ ] NO   MICU DOWNGRADE NOTE    *Pt seen and examined prior to midnight*    Patient is a 64y old  Male who presents with a chief complaint of Bradycardia (15 Apr 2025 14:30)      HPI:  64 M PMHx ESRD (HD T/Th/S right chest wall cath, LUE graft), Afib s/p ablation, LAAO (watchman), CAD s/p CABG x2, bioprosthetic AVR/MR repair, MV endocarditis 2023, type A aortic dissection s/p repair w/ ischemic bowel s/p resection, seizure disorder, hx substance abuse, recent admission 3/18-3/21 for malfunction LUE graft sent to [facility] ED from HD due to bradycardia, unable to undergo HD (x2). In ED Patient was bradycardic to 30s, asymptomatic, refractory to atropine and calcium in ED. EKG showed junctional bradycardia. Patient then became hypertensive. Admitted to MICU for HD and further monitoring of bradycardia not requiring TVP. Infectious workup ordered due to hx of endocarditis. Cardiology, EP, nephrology consulted.     INTERVAL HPI/OVERNIGHT EVENTS:  Patient went for atrial pacemaker placement 4/15 via RVF access. Patient placed on MWF HD schedule, last dialysis 4/14. Heparin DVT prophylaxis started on MICU downgrade, received dose 1x at 1500. At time exam has no complaints, no bleeding/bruising at groin site, wondering when he could go home.     REVIEW OF SYSTEMS:  CONSTITUTIONAL: No fever, chills  HEENT:  No blurry vision No sinus or throat pain  NECK: No pain or stiffness  RESPIRATORY: No cough, wheezing, chills or hemoptysis; No shortness of breath  CARDIOVASCULAR: No chest pain, palpitations  GASTROINTESTINAL: No abdominal pain. No nausea, vomiting, or diarrhea  GENITOURINARY: No dysuria  NEUROLOGICAL: No HA, No focal weakness  SKIN: No itching, burning, rashes, or lesions   MUSCULOSKELETAL: No joint pain or swelling; No muscle, back, or extremity pain    MEDICATIONS:  amLODIPine   Tablet 10 milliGRAM(s) Oral at bedtime  atorvastatin 40 milliGRAM(s) Oral at bedtime  carvedilol 6.25 milliGRAM(s) Oral every 12 hours  chlorhexidine 2% Cloths 1 Application(s) Topical <User Schedule>  clindamycin IVPB 600 milliGRAM(s) IV Intermittent every 8 hours  diphenoxylate/atropine 1 Tablet(s) Oral every 6 hours PRN  heparin   Injectable 5000 Unit(s) SubCutaneous every 12 hours  hydrALAZINE 10 milliGRAM(s) Oral every 4 hours PRN  pantoprazole    Tablet 40 milliGRAM(s) Oral before breakfast  tamsulosin 0.4 milliGRAM(s) Oral at bedtime      T(C): 36.7 (04-15-25 @ 21:13), Max: 37.3 (04-15-25 @ 08:00)  HR: 65 (04-15-25 @ 21:13) (33 - 66)  BP: 155/53 (04-15-25 @ 21:13) (102/72 - 192/78)  RR: 18 (04-15-25 @ 21:13) (11 - 18)  SpO2: 94% (04-15-25 @ 21:13) (87% - 100%)  Wt(kg): --Vital Signs Last 24 Hrs  T(C): 36.7 (15 Apr 2025 21:13), Max: 37.3 (15 Apr 2025 08:00)  T(F): 98 (15 Apr 2025 21:13), Max: 99.1 (15 Apr 2025 08:00)  HR: 65 (15 Apr 2025 21:13) (33 - 66)  BP: 155/53 (15 Apr 2025 21:13) (102/72 - 192/78)  BP(mean): 92 (15 Apr 2025 09:00) (74 - 94)  RR: 18 (15 Apr 2025 21:13) (11 - 18)  SpO2: 94% (15 Apr 2025 21:13) (87% - 100%)    Parameters below as of 15 Apr 2025 21:13  Patient On (Oxygen Delivery Method): room air        PHYSICAL EXAM:  GENERAL: NAD, well-groomed, well-developed  HEAD:  Atraumatic, Normocephalic  EYES: EOMI, PERRLA, conjunctiva and sclera clear  ENMT:  Moist mucous membranes  NECK: Supple, No JVD,  CHEST/LUNG: Clear to auscultation bilaterally; No rales, rhonchi, wheezing, or rubs  HEART: Regular rate and rhythm; No murmurs, rubs, or gallops  ABDOMEN: Soft, Nontender, Nondistended; Bowel sounds present  NEURO: Alert & Oriented X3  EXTREMITIES: No LE edema, no calf tenderness, occlusive bandage in right groin w/o signs of bleeding/bruising/soiling, right leg in immobilization brace. LUE graft with palpable thrill.   LYMPH: No lymphadenopathy noted  SKIN: No rashes or lesions    Consultant(s) Notes Reviewed:  [x ] YES  [ ] NO  Care Discussed with Consultants/Other Providers [ x] YES  [ ] NO    LABS:                        9.8    4.45  )-----------( 75       ( 15 Apr 2025 02:58 )             30.2     04-15    141  |  101  |  51.2[H]  ----------------------------<  97  3.8   |  27.0  |  5.59[H]    Ca    8.6      15 Apr 2025 02:58  Phos  5.0     04-15  Mg     2.1     04-15    TPro  6.7  /  Alb  3.9  /  TBili  1.3  /  DBili  x   /  AST  16  /  ALT  11  /  AlkPhos  94  04-15      RADIOLOGY & ADDITIONAL TESTS:  Imaging Personally Reviewed:  [x ] YES  [ ] NO

## 2025-04-15 NOTE — PROGRESS NOTE ADULT - ATTENDING COMMENTS
.    64M PMH ESRD on HD, AF not on A/C 2/2 GIB s/p Watchman, CAD s/p CABG, prior mitral valve IE (2023), s/p aortic and mitral valve replacements, type A aortic dissection s/p repair c/b ischemic bowel, seizure d/o who presented 4/13/25 with dizziness, lightheadedness, missed 2 HD sessions given ongoing bradycardia. In the ED his HR was 30s however BP hypertensive. He takes Lomotil for diarrhea and also Clonidine for HTN, 0.3mg BID. He has sinus bradycardia competing with junctional bradycardia. S/p HD yesterday 4/14/25. Planned for PPM today 4/15/25. If stable post-procedure will downgrade to telemetry.      Celso Jeffery MD, [facility]  , Pulmonary & Critical Care Medicine  [facility]  Pulmonary and Sleep Medicine at [place]  [address]  [place], [place] [address]  T: [phone]  F: [phone]

## 2025-04-16 ENCOUNTER — TRANSCRIPTION ENCOUNTER (OUTPATIENT)
Age: 65
End: 2025-04-16

## 2025-04-16 VITALS
HEART RATE: 67 BPM | OXYGEN SATURATION: 95 % | WEIGHT: 136.91 LBS | RESPIRATION RATE: 18 BRPM | SYSTOLIC BLOOD PRESSURE: 146 MMHG | DIASTOLIC BLOOD PRESSURE: 65 MMHG | TEMPERATURE: 97 F

## 2025-04-16 LAB
ALBUMIN SERPL ELPH-MCNC: 3.9 G/DL — SIGNIFICANT CHANGE UP (ref 3.3–5.2)
ALP SERPL-CCNC: 95 U/L — SIGNIFICANT CHANGE UP (ref 40–120)
ALT FLD-CCNC: 9 U/L — SIGNIFICANT CHANGE UP
ANION GAP SERPL CALC-SCNC: 18 MMOL/L — HIGH (ref 5–17)
AST SERPL-CCNC: 13 U/L — SIGNIFICANT CHANGE UP
BILIRUB SERPL-MCNC: 1.3 MG/DL — SIGNIFICANT CHANGE UP (ref 0.4–2)
BUN SERPL-MCNC: 63.3 MG/DL — HIGH (ref 8–20)
CALCIUM SERPL-MCNC: 8 MG/DL — LOW (ref 8.4–10.5)
CHLORIDE SERPL-SCNC: 100 MMOL/L — SIGNIFICANT CHANGE UP (ref 96–108)
CO2 SERPL-SCNC: 22 MMOL/L — SIGNIFICANT CHANGE UP (ref 22–29)
CREAT SERPL-MCNC: 6.42 MG/DL — HIGH (ref 0.5–1.3)
EGFR: 9 ML/MIN/1.73M2 — LOW
EGFR: 9 ML/MIN/1.73M2 — LOW
GLUCOSE SERPL-MCNC: 80 MG/DL — SIGNIFICANT CHANGE UP (ref 70–99)
HCT VFR BLD CALC: 29.2 % — LOW (ref 39–50)
HGB BLD-MCNC: 9.3 G/DL — LOW (ref 13–17)
IMMATURE PLATELET FRACTION #: 3 K/UL — LOW (ref 3.9–12.5)
IMMATURE PLATELET FRACTION %: 4.6 % — SIGNIFICANT CHANGE UP (ref 1.6–7.1)
MAGNESIUM SERPL-MCNC: 2 MG/DL — SIGNIFICANT CHANGE UP (ref 1.8–2.6)
MCHC RBC-ENTMCNC: 30.5 PG — SIGNIFICANT CHANGE UP (ref 27–34)
MCHC RBC-ENTMCNC: 31.8 G/DL — LOW (ref 32–36)
MCV RBC AUTO: 95.7 FL — SIGNIFICANT CHANGE UP (ref 80–100)
NRBC # BLD AUTO: 0 K/UL — SIGNIFICANT CHANGE UP (ref 0–0)
NRBC # FLD: 0 K/UL — SIGNIFICANT CHANGE UP (ref 0–0)
NRBC BLD AUTO-RTO: 0 /100 WBCS — SIGNIFICANT CHANGE UP (ref 0–0)
PHOSPHATE SERPL-MCNC: 6.5 MG/DL — HIGH (ref 2.4–4.7)
PLATELET # BLD AUTO: 66 K/UL — LOW (ref 150–400)
PMV BLD: 11.9 FL — SIGNIFICANT CHANGE UP (ref 7–13)
POTASSIUM SERPL-MCNC: 4 MMOL/L — SIGNIFICANT CHANGE UP (ref 3.5–5.3)
POTASSIUM SERPL-SCNC: 4 MMOL/L — SIGNIFICANT CHANGE UP (ref 3.5–5.3)
PROT SERPL-MCNC: 6.6 G/DL — SIGNIFICANT CHANGE UP (ref 6.6–8.7)
RBC # BLD: 3.05 M/UL — LOW (ref 4.2–5.8)
RBC # FLD: 14.1 % — SIGNIFICANT CHANGE UP (ref 10.3–14.5)
SODIUM SERPL-SCNC: 140 MMOL/L — SIGNIFICANT CHANGE UP (ref 135–145)
WBC # BLD: 3.93 K/UL — SIGNIFICANT CHANGE UP (ref 3.8–10.5)
WBC # FLD AUTO: 3.93 K/UL — SIGNIFICANT CHANGE UP (ref 3.8–10.5)

## 2025-04-16 PROCEDURE — 93010 ELECTROCARDIOGRAM REPORT: CPT

## 2025-04-16 PROCEDURE — 71046 X-RAY EXAM CHEST 2 VIEWS: CPT | Mod: 26

## 2025-04-16 PROCEDURE — 99239 HOSP IP/OBS DSCHRG MGMT >30: CPT

## 2025-04-16 RX ADMIN — Medication 1 APPLICATION(S): at 05:13

## 2025-04-16 RX ADMIN — Medication 40 MILLIGRAM(S): at 05:13

## 2025-04-16 RX ADMIN — LEVETIRACETAM 500 MILLIGRAM(S): 10 INJECTION, SOLUTION INTRAVENOUS at 05:13

## 2025-04-16 RX ADMIN — Medication 0.3 MILLIGRAM(S): at 05:13

## 2025-04-16 RX ADMIN — Medication 20 MILLIGRAM(S): at 05:14

## 2025-04-16 RX ADMIN — CARVEDILOL 6.25 MILLIGRAM(S): 3.12 TABLET, FILM COATED ORAL at 05:12

## 2025-04-16 RX ADMIN — Medication 100 MILLIGRAM(S): at 05:10

## 2025-04-16 NOTE — PROGRESS NOTE ADULT - SUBJECTIVE AND OBJECTIVE BOX
Pt doing well POD #1 s/p AVEIR AR Atrial Leadless Pacemaker implant. Stable overnight w/o event. Denies complaint.       Exam:   VSS, NAD, A&O x 3  Groin:  RFV access healing well. No hematoma, swelling or bleeding noted.  Card: S1/S2, RRR, no m/g/r  Resp: lungs CTA b/l  Abd: S/NT/ND  Ext: no edema, distal pulses intact      Device interrogation: Pending    Tele:   APaced @ 60-65bpm with narrow QRS    CXR: PENDING.     Labs: consistent w/ baseline.       Assessment:   63 y/o male ESRD HD T,Th,S, atrial fibrillation s/p ablation (PVI, CTI, Mitral line, 2/2021), LAAO with Watchman device 8/2024, type A aortic dissection s/p repair 11/2020 c/b ischemic bowel s/p resection, CAD s/p re-op sternotomy CABG x2 and bioprosthetic  AVR/ MV repair, former substance use disorder, MV endocarditis 2023, recent hospitalization 3/18 - 3/21 due to malfunctioning LUE fistula now s/p R sided IR permacath placement, who now presents to [facility] with complaints of bradycardia. Pt states his dialysis center would not perform dialysis on his this past Thursday and Saturday due to bradycardia and advised him to come to the hospital for an assessment. EKG and telemetry review at time of arrival reveals junctional bradycardia 30-40s.    Now POD # 1 s/p uncomplicated AVEIR AR Atrial Leadless Pacemaker via RFV access.   Programmed AAIR 60bpm.  RFV suture removed without incident.      Plan:   Can resume home Coreg  Pt instructed as to activity limitations - no lifting/pushing/pulling >10 lbs or strenuous exercise x 1 week.   Pt instructed as to access site care and f/up - written instructions included in d/c documents.  Pending CXR, no barriers to d/c from EP service perspective.   Will await CXR, but otherwise sign off as to EP. Please call EP service with questions, concerns or further arrhythmia issues.   Outpt f/up in 2-4 weeks - office will contact pt to schedule.  Dispo per primary team.           Pt doing well POD #1 s/p AVEIR AR Atrial Leadless Pacemaker implant. Stable overnight w/o event. Denies complaint.       Exam:   VSS, NAD, A&O x 3  Groin:  RFV access healing well. No hematoma, swelling or bleeding noted.  Card: S1/S2, RRR, no m/g/r  Resp: lungs CTA b/l  Abd: S/NT/ND  Ext: no edema, distal pulses intact      Device interrogation:  Device interrogatoin shows normal function in AAIR @ 65bpm,  all values tested are stable and WNL  Currently dependent despite testing at AAI @ 30bpm    Tele:   APaced @ 60-65bpm with narrow QRS    CXR: PENDING.     Labs: consistent w/ baseline.       Assessment:   65 y/o male ESRD HD T,Th,S, atrial fibrillation s/p ablation (PVI, CTI, Mitral line, 2/2021), LAAO with Watchman device 8/2024, type A aortic dissection s/p repair 11/2020 c/b ischemic bowel s/p resection, CAD s/p re-op sternotomy CABG x2 and bioprosthetic  AVR/ MV repair, former substance use disorder, MV endocarditis 2023, recent hospitalization 3/18 - 3/21 due to malfunctioning LUE fistula now s/p R sided IR permacath placement, who now presents to [facility] with complaints of bradycardia. Pt states his dialysis center would not perform dialysis on his this past Thursday and Saturday due to bradycardia and advised him to come to the hospital for an assessment. EKG and telemetry review at time of arrival reveals junctional bradycardia 30-40s.    Now POD # 1 s/p uncomplicated AVEIR AR Atrial Leadless Pacemaker via RFV access.   Programmed AAIR 60bpm.  RFV suture removed without incident.      Plan:   Can resume home Coreg  Pt instructed as to activity limitations - no lifting/pushing/pulling >10 lbs or strenuous exercise x 1 week.   Pt instructed as to access site care and f/up - written instructions included in d/c documents.  Pending CXR, no barriers to d/c from EP service perspective.   Will await CXR, but otherwise sign off as to EP. Please call EP service with questions, concerns or further arrhythmia issues.   Outpt f/up in 2-4 weeks - office will contact pt to schedule.  Dispo per primary team.           Pt doing well POD #1 s/p AVEIR AR Atrial Leadless Pacemaker implant. Stable overnight w/o event. Denies complaint.       Exam:   VSS, NAD, A&O x 3  Groin:  RFV access healing well. No hematoma, swelling or bleeding noted.  Card: S1/S2, RRR, no m/g/r  Resp: lungs CTA b/l  Abd: S/NT/ND  Ext: no edema, distal pulses intact      Device interrogation:  Device interrogation shows normal function in AAIR @ 65bpm,  all values tested are stable and WNL  Currently dependent despite testing at AAI @ 30bpm    Tele:   APaced @ 60-65bpm with narrow QRS    CXR:  Appropriate and stable device placement    Labs: consistent w/ baseline.       Assessment:   63 y/o male ESRD HD T,Th,S, atrial fibrillation s/p ablation (PVI, CTI, Mitral line, 2/2021), LAAO with Watchman device 8/2024, type A aortic dissection s/p repair 11/2020 c/b ischemic bowel s/p resection, CAD s/p re-op sternotomy CABG x2 and bioprosthetic  AVR/ MV repair, former substance use disorder, MV endocarditis 2023, recent hospitalization 3/18 - 3/21 due to malfunctioning LUE fistula now s/p R sided IR permacath placement, who now presents to [facility] with complaints of bradycardia. Pt states his dialysis center would not perform dialysis on his this past Thursday and Saturday due to bradycardia and advised him to come to the hospital for an assessment. EKG and telemetry review at time of arrival reveals junctional bradycardia 30-40s.    Now POD # 1 s/p uncomplicated AVEIR AR Atrial Leadless Pacemaker via RFV access.   Programmed AAIR 60bpm.  RFV suture removed without incident.      Plan:   Can resume home Coreg  Pt instructed as to activity limitations - no lifting/pushing/pulling >10 lbs or strenuous exercise x 1 week.   Pt instructed as to access site care and f/up - written instructions included in d/c documents.  Chest Xray PA/Lateral shows appropriate and stable device placement   To arrange outpt f/up in 2-4 weeks - office will contact pt to schedule.  Rory sign off. Please call EP service with questions, concerns or further arrhythmia issues.   Dispo per primary team.

## 2025-04-16 NOTE — DISCHARGE NOTE NURSING/CASE MANAGEMENT/SOCIAL WORK - NSDCVIVACCINE_GEN_ALL_CORE_FT
influenza, injectable, quadrivalent, preservative free; 12-Dec-2020 09:28; Lynn Gerardo); Knotice; 724k2 (Exp. Date: 30-Jun-2021); IntraMuscular; Deltoid Right.; 0.5 milliLiter(s); VIS (VIS Published: 15-Aug-2019, VIS Presented: 12-Dec-2020);

## 2025-04-16 NOTE — DISCHARGE NOTE PROVIDER - HOSPITAL COURSE
64 M PMHx ESRD (HD T/Th/S right chest wall cath, LUE graft), Afib s/p ablation, LAAO (watchman), CAD s/p CABG x2, bioprosthetic AVR/MR repair, MV endocarditis 2023, type A aortic dissection s/p repair w/ ischemic bowel s/p resection, seizure disorder, hx substance abuse, recent admission 3/18-3/21 for malfunction LUE graft sent to [facility] ED from HD due to bradycardia, unable to undergo HD (x2). Pt admitted to [facility] for asx bradycardia 2/2 junctional rhythm s/p PPM. Cardiology/ EP was consulted. TTE showed EF 65, elevated PAP, wall motion abnormalities consistent with prior CAD, valvular repair, no signs of infections/vegetations. AVEIR AR Atrial Leadless Pacemaker via RFV access w/ programmed AAIR 60bpm on 4/14. Pt was initiated on Clindamycin 600mg IV q 8 hours x 2, last dose 4/16. Medications were adjusted and pt was later optimized. Pt with ESRD as well and Nephrology was consulted. Pt has R permacath and hx of LUE graft hematoma. AVF also in place and pt is for HD via AVF. If pt can tolerate HD via fistula then pt is medically optimized and stable for discharge pending Nephrology and Vascular Surgery clearance.     All electrolyte abnormalities were monitored carefully and repleted as necessary during this hospitalization. At the time of discharge patient was hemodynamically stable and amenable to all terms of discharge. 64 M PMHx ESRD (HD T/Th/S right chest wall cath, LUE graft), Afib s/p ablation, LAAO (watchman), CAD s/p CABG x2, bioprosthetic AVR/MR repair, MV endocarditis 2023, type A aortic dissection s/p repair w/ ischemic bowel s/p resection, seizure disorder, hx substance abuse, recent admission 3/18-3/21 for malfunction LUE graft sent to [facility] ED from HD due to bradycardia, unable to undergo HD (x2). Pt admitted to [facility] for asx bradycardia 2/2 junctional rhythm s/p PPM. Cardiology/ EP was consulted. TTE showed EF 65, elevated PAP, wall motion abnormalities consistent with prior CAD, valvular repair, no signs of infections/vegetations. AVEIR AR Atrial Leadless Pacemaker via RFV access w/ programmed AAIR 60bpm on 4/14. Pt was initiated on Clindamycin 600mg IV q 8 hours x 2, last dose 4/16. Medications were adjusted and pt was later optimized. Pt with ESRD as well and Nephrology was consulted. Pt has R permacath and hx of LUE graft hematoma. AVF also in place and pt is for HD via AVF. Pt tolerated HD via the AVF and is medically optimized and stable for discharge.    All electrolyte abnormalities were monitored carefully and repleted as necessary during this hospitalization. At the time of discharge patient was hemodynamically stable and amenable to all terms of discharge.

## 2025-04-16 NOTE — CHART NOTE - NSCHARTNOTEFT_GEN_A_CORE
Patient seen at the bedside and evaluated. s/p leadless pacemaker placement and subsequent downgrade to the floor. Pending trial of HD via LUE graft. Vascular to follow pending HD tolerance.

## 2025-04-16 NOTE — DISCHARGE NOTE PROVIDER - NSDCCPCAREPLAN_GEN_ALL_CORE_FT
PRINCIPAL DISCHARGE DIAGNOSIS  Diagnosis: Sinus bradycardia  Assessment and Plan of Treatment: - 4/14 AVEIR AR Atrial Leadless Pacemaker via RFV access w/ programmed AAIR 60bpm  - Continue medications as directed  - Follow up with PCP, Cardiology and EP in 1 week      SECONDARY DISCHARGE DIAGNOSES  Diagnosis: ESRD on dialysis  Assessment and Plan of Treatment: - Continue HD as directed  - Follow up with Nephrology in 1 week

## 2025-04-16 NOTE — DISCHARGE NOTE PROVIDER - ATTENDING DISCHARGE PHYSICAL EXAMINATION:
VITALS:   T(C): 36.7 (04-16-25 @ 10:00), Max: 36.7 (04-15-25 @ 21:13)  HR: 66 (04-16-25 @ 10:00) (60 - 66)  BP: 149/75 (04-16-25 @ 10:00) (137/57 - 192/78)  RR: 16 (04-16-25 @ 10:00) (11 - 18)  SpO2: 94% (04-16-25 @ 10:00) (94% - 100%)    GENERAL: NAD, lying in bed comfortably  HEAD:  Atraumatic, Normocephalic  CHEST/LUNG: Clear to auscultation bilaterally; Unlabored respirations. +Permacath, +L AVF thrill  HEART: Regular rate and rhythm; No murmurs, rubs, or gallops  ABDOMEN: BSx4; Soft, nontender, nondistended

## 2025-04-16 NOTE — DISCHARGE NOTE PROVIDER - CARE PROVIDERS DIRECT ADDRESSES
,DirectAddress_Unknown,[email or web address],DirectAddress_Unknown

## 2025-04-16 NOTE — DISCHARGE NOTE PROVIDER - NSDCFUADDAPPT_GEN_ALL_CORE_FT
APPTS ARE READY TO BE MADE: [X] YES    Best Family or Patient Contact (if needed):    Additional Information about above appointments (if needed):    1: EP (2 weeks)  2:   3:     Other comments or requests:

## 2025-04-16 NOTE — DISCHARGE NOTE PROVIDER - NSDCFUSCHEDAPPT_GEN_ALL_CORE_FT
Felice Frankel  [facility] Physician Formerly [facility], [facility]  CARDIOLOGY [address]  Scheduled Appointment: 04/29/2025

## 2025-04-16 NOTE — DISCHARGE NOTE NURSING/CASE MANAGEMENT/SOCIAL WORK - FINANCIAL ASSISTANCE
[facility] provides services at a reduced cost to those who are determined to be eligible through [facility]’s financial assistance program. Information regarding [facility]’s financial assistance program can be found by going to [email or web address] or by calling [phone].

## 2025-04-16 NOTE — DISCHARGE NOTE PROVIDER - PROVIDER TOKENS
PROVIDER:[TOKEN:[[id number]],FOLLOWUP:[1 week]],PROVIDER:[TOKEN:[[id number]],FOLLOWUP:[1 week]],FREE:[LAST:[PCP],PHONE:[(   )    -],FAX:[(   )    -],FOLLOWUP:[1 week]]

## 2025-04-16 NOTE — PROGRESS NOTE ADULT - SUBJECTIVE AND OBJECTIVE BOX
NEPHROLOGY INTERVAL HPI/OVERNIGHT EVENTS:    Pacer  yesterday.    MEDICATIONS  (STANDING):  chlorhexidine 2% Cloths 1 Application(s) Topical <User Schedule>  heparin   Injectable 5000 Unit(s) SubCutaneous every 12 hours  pantoprazole    Tablet 40 milliGRAM(s) Oral before breakfast  tamsulosin 0.4 milliGRAM(s) Oral at bedtime    MEDICATIONS  (PRN):  diphenoxylate/atropine 1 Tablet(s) Oral every 6 hours PRN Diarrhea  hydrALAZINE 10 milliGRAM(s) Oral every 4 hours PRN Systolic blood pressure > 180      Allergies    penicillin (Other)          Vital Signs Last 24 Hrs  T(C): 36.7 (2025 05:07), Max: 36.7 (15 Apr 2025 09:50)  T(F): 98.1 (2025 05:07), Max: 98.1 (15 Apr 2025 09:50)  HR: 64 (2025 05:07) (38 - 66)  BP: 162/74 (2025 05:07) (131/82 - 192/78)  BP(mean): --  RR: 18 (2025 05:07) (11 - 18)  SpO2: 94% (2025 05:07) (94% - 100%)    Parameters below as of 2025 05:07  Patient On (Oxygen Delivery Method): room air      T(C): 36.7 (15 Apr 2025 09:50), Max: 37.3 (15 Apr 2025 08:00)  T(F): 98.1 (15 Apr 2025 09:50), Max: 99.1 (15 Apr 2025 08:00)  HR: 38 (15 Apr 2025 09:50) (33 - 59)  BP: 131/82 (15 Apr 2025 09:50) (102/72 - 189/77)  BP(mean): 92 (15 Apr 2025 09:00) (74 - 115)  RR: 16 (15 Apr 2025 09:50) (13 - 22)  SpO2: 96% (15 Apr 2025 09:50) (87% - 100%)    Parameters below as of 15 Apr 2025 09:50  Patient On (Oxygen Delivery Method): room air           Daily Weight in k.5 (2025 18:15)    PHYSICAL EXAM:  GENERAL: NAD  HEAD:  Wnl  EYES: open  NECK: Supple with  right upper  chest  permacath  NERVOUS SYSTEM:  Alert & Oriented in bed  CHEST/LUNG: Clear to percussion bilaterally  HEART: Monitor  noted, no  rub  ABDOMEN: Soft, Nontender, Nondistended; +BS  EXTREMITIES:  No  edema, has  muscle  atrophy; left arm access with good  bruit     LABS:        140  |  100  |  63.3[H]  ----------------------------<  80  4.0   |  22.0  |  6.42[H]    Ca    8.0[L]      2025 04:55  Phos  6.5     04-16  Mg     2.0         TPro  6.6  /  Alb  3.9  /  TBili  1.3  /  DBili  x   /  AST  13  /  ALT  9   /  AlkPhos  95                            9.8    4.45  )-----------( 75       ( 15 Apr 2025 02:58 )             30.2     04-15    141  |  101  |  51.2[H]  ----------------------------<  97  3.8   |  27.0  |  5.59[H]    Ca    8.6      15 Apr 2025 02:58  Phos  5.0     -15  Mg     2.1     -15    TPro  6.7  /  Alb  3.9  /  TBili  1.3  /  DBili  x   /  AST  16  /  ALT  11  /  AlkPhos  94  15      Urinalysis Basic - ( 15 Apr 2025 02:58 )    Color: x / Appearance: x / SG: x / pH: x  Gluc: 97 mg/dL / Ketone: x  / Bili: x / Urobili: x   Blood: x / Protein: x / Nitrite: x   Leuk Esterase: x / RBC: x / WBC x   Sq Epi: x / Non Sq Epi: x / Bacteria: x      Magnesium: 2.1 mg/dL (04-15 @ 02:58)  Phosphorus: 5.0 mg/dL (04-15 @ 02:58)  Magnesium: 1.8 mg/dL ( @ 19:00)  Phosphorus: 3.4 mg/dL ( @ 19:00)          RADIOLOGY & ADDITIONAL TESTS:

## 2025-04-16 NOTE — DISCHARGE NOTE NURSING/CASE MANAGEMENT/SOCIAL WORK - PATIENT PORTAL LINK FT
You can access the FollowMyHealth Patient Portal offered by [facility] by registering at the following website: [email or web address]. By joining [employer]’s FollowMyHealth portal, you will also be able to view your health information using other applications (apps) compatible with our system.

## 2025-04-16 NOTE — DISCHARGE NOTE PROVIDER - CARE PROVIDER_API CALL
Luisito Victoria  Nephrology  [address], [place] [address]  Phone: [phone]  Fax: [phone]  Follow Up Time: 1 week    James Trevino  Cardiac Electrophysiology  [address], [address] [address]  Phone: [phone]  Fax: [phone]  Follow Up Time: 1 week    PCP,   Phone: (   )    -  Fax: (   )    -  Follow Up Time: 1 week

## 2025-04-21 ENCOUNTER — EMERGENCY (EMERGENCY)
Facility: HOSPITAL | Age: 65
LOS: 1 days | End: 2025-04-21
Attending: STUDENT IN AN ORGANIZED HEALTH CARE EDUCATION/TRAINING PROGRAM
Payer: MEDICARE

## 2025-04-21 VITALS
TEMPERATURE: 98 F | OXYGEN SATURATION: 95 % | SYSTOLIC BLOOD PRESSURE: 199 MMHG | HEART RATE: 63 BPM | HEIGHT: 66 IN | DIASTOLIC BLOOD PRESSURE: 80 MMHG | WEIGHT: 141.1 LBS | RESPIRATION RATE: 20 BRPM

## 2025-04-21 DIAGNOSIS — Z94.0 KIDNEY TRANSPLANT STATUS: Chronic | ICD-10-CM

## 2025-04-21 DIAGNOSIS — Z86.79 PERSONAL HISTORY OF OTHER DISEASES OF THE CIRCULATORY SYSTEM: Chronic | ICD-10-CM

## 2025-04-21 DIAGNOSIS — I77.0 ARTERIOVENOUS FISTULA, ACQUIRED: Chronic | ICD-10-CM

## 2025-04-21 DIAGNOSIS — Z90.49 ACQUIRED ABSENCE OF OTHER SPECIFIED PARTS OF DIGESTIVE TRACT: Chronic | ICD-10-CM

## 2025-04-21 DIAGNOSIS — Z95.818 PRESENCE OF OTHER CARDIAC IMPLANTS AND GRAFTS: Chronic | ICD-10-CM

## 2025-04-21 DIAGNOSIS — Z95.2 PRESENCE OF PROSTHETIC HEART VALVE: Chronic | ICD-10-CM

## 2025-04-21 DIAGNOSIS — Z95.1 PRESENCE OF AORTOCORONARY BYPASS GRAFT: Chronic | ICD-10-CM

## 2025-04-21 LAB
ALBUMIN SERPL ELPH-MCNC: 4.2 G/DL — SIGNIFICANT CHANGE UP (ref 3.3–5.2)
ALP SERPL-CCNC: 106 U/L — SIGNIFICANT CHANGE UP (ref 40–120)
ALT FLD-CCNC: 14 U/L — SIGNIFICANT CHANGE UP
ANION GAP SERPL CALC-SCNC: 19 MMOL/L — HIGH (ref 5–17)
AST SERPL-CCNC: 21 U/L — SIGNIFICANT CHANGE UP
BASOPHILS # BLD AUTO: 0.02 K/UL — SIGNIFICANT CHANGE UP (ref 0–0.2)
BASOPHILS NFR BLD AUTO: 0.3 % — SIGNIFICANT CHANGE UP (ref 0–2)
BILIRUB SERPL-MCNC: 1.3 MG/DL — SIGNIFICANT CHANGE UP (ref 0.4–2)
BUN SERPL-MCNC: 68 MG/DL — HIGH (ref 8–20)
CALCIUM SERPL-MCNC: 9.1 MG/DL — SIGNIFICANT CHANGE UP (ref 8.4–10.5)
CHLORIDE SERPL-SCNC: 104 MMOL/L — SIGNIFICANT CHANGE UP (ref 96–108)
CO2 SERPL-SCNC: 22 MMOL/L — SIGNIFICANT CHANGE UP (ref 22–29)
CREAT SERPL-MCNC: 5.8 MG/DL — HIGH (ref 0.5–1.3)
EGFR: 10 ML/MIN/1.73M2 — LOW
EGFR: 10 ML/MIN/1.73M2 — LOW
EOSINOPHIL # BLD AUTO: 0.05 K/UL — SIGNIFICANT CHANGE UP (ref 0–0.5)
EOSINOPHIL NFR BLD AUTO: 0.7 % — SIGNIFICANT CHANGE UP (ref 0–6)
GLUCOSE SERPL-MCNC: 93 MG/DL — SIGNIFICANT CHANGE UP (ref 70–99)
HCT VFR BLD CALC: 28.7 % — LOW (ref 39–50)
HGB BLD-MCNC: 9.2 G/DL — LOW (ref 13–17)
IMM GRANULOCYTES # BLD AUTO: 0.03 K/UL — SIGNIFICANT CHANGE UP (ref 0–0.07)
IMM GRANULOCYTES NFR BLD AUTO: 0.4 % — SIGNIFICANT CHANGE UP (ref 0–0.9)
IMMATURE PLATELET FRACTION #: 3.2 K/UL — LOW (ref 3.9–12.5)
IMMATURE PLATELET FRACTION %: 4.3 % — SIGNIFICANT CHANGE UP (ref 1.6–7.1)
LIDOCAIN IGE QN: 27 U/L — SIGNIFICANT CHANGE UP (ref 22–51)
LYMPHOCYTES # BLD AUTO: 0.8 K/UL — LOW (ref 1–3.3)
LYMPHOCYTES NFR BLD AUTO: 11.7 % — LOW (ref 13–44)
MAGNESIUM SERPL-MCNC: 1.5 MG/DL — LOW (ref 1.6–2.6)
MCHC RBC-ENTMCNC: 30.7 PG — SIGNIFICANT CHANGE UP (ref 27–34)
MCHC RBC-ENTMCNC: 32.1 G/DL — SIGNIFICANT CHANGE UP (ref 32–36)
MCV RBC AUTO: 95.7 FL — SIGNIFICANT CHANGE UP (ref 80–100)
MONOCYTES # BLD AUTO: 0.35 K/UL — SIGNIFICANT CHANGE UP (ref 0–0.9)
MONOCYTES NFR BLD AUTO: 5.1 % — SIGNIFICANT CHANGE UP (ref 2–14)
NEUTROPHILS # BLD AUTO: 5.61 K/UL — SIGNIFICANT CHANGE UP (ref 1.8–7.4)
NEUTROPHILS NFR BLD AUTO: 81.8 % — HIGH (ref 43–77)
NRBC # BLD AUTO: 0 K/UL — SIGNIFICANT CHANGE UP (ref 0–0)
NRBC # FLD: 0 K/UL — SIGNIFICANT CHANGE UP (ref 0–0)
NRBC BLD AUTO-RTO: 0 /100 WBCS — SIGNIFICANT CHANGE UP (ref 0–0)
PLATELET # BLD AUTO: 75 K/UL — LOW (ref 150–400)
PMV BLD: 12.1 FL — SIGNIFICANT CHANGE UP (ref 7–13)
POTASSIUM SERPL-MCNC: 4.2 MMOL/L — SIGNIFICANT CHANGE UP (ref 3.5–5.3)
POTASSIUM SERPL-SCNC: 4.2 MMOL/L — SIGNIFICANT CHANGE UP (ref 3.5–5.3)
PROT SERPL-MCNC: 7 G/DL — SIGNIFICANT CHANGE UP (ref 6.6–8.7)
RBC # BLD: 3 M/UL — LOW (ref 4.2–5.8)
RBC # FLD: 13.9 % — SIGNIFICANT CHANGE UP (ref 10.3–14.5)
SODIUM SERPL-SCNC: 144 MMOL/L — SIGNIFICANT CHANGE UP (ref 135–145)
WBC # BLD: 6.86 K/UL — SIGNIFICANT CHANGE UP (ref 3.8–10.5)
WBC # FLD AUTO: 6.86 K/UL — SIGNIFICANT CHANGE UP (ref 3.8–10.5)

## 2025-04-21 PROCEDURE — 36410 VNPNXR 3YR/> PHY/QHP DX/THER: CPT

## 2025-04-21 PROCEDURE — 93010 ELECTROCARDIOGRAM REPORT: CPT

## 2025-04-21 PROCEDURE — 99285 EMERGENCY DEPT VISIT HI MDM: CPT

## 2025-04-21 PROCEDURE — 74176 CT ABD & PELVIS W/O CONTRAST: CPT | Mod: 26

## 2025-04-21 RX ORDER — MAGNESIUM OXIDE 400 MG
400 TABLET ORAL ONCE
Refills: 0 | Status: COMPLETED | OUTPATIENT
Start: 2025-04-21 | End: 2025-04-21

## 2025-04-21 RX ORDER — ACETAMINOPHEN 500 MG/5ML
1000 LIQUID (ML) ORAL ONCE
Refills: 0 | Status: COMPLETED | OUTPATIENT
Start: 2025-04-21 | End: 2025-04-21

## 2025-04-21 RX ORDER — ONDANSETRON HCL/PF 4 MG/2 ML
4 VIAL (ML) INJECTION ONCE
Refills: 0 | Status: COMPLETED | OUTPATIENT
Start: 2025-04-21 | End: 2025-04-21

## 2025-04-21 RX ORDER — IOHEXOL 350 MG/ML
30 INJECTION, SOLUTION INTRAVENOUS ONCE
Refills: 0 | Status: COMPLETED | OUTPATIENT
Start: 2025-04-21 | End: 2025-04-21

## 2025-04-21 RX ADMIN — Medication 1000 MILLILITER(S): at 21:03

## 2025-04-21 RX ADMIN — Medication 4 MILLIGRAM(S): at 21:03

## 2025-04-21 RX ADMIN — Medication 400 MILLIGRAM(S): at 21:50

## 2025-04-21 RX ADMIN — IOHEXOL 30 MILLILITER(S): 350 INJECTION, SOLUTION INTRAVENOUS at 23:14

## 2025-04-21 RX ADMIN — Medication 400 MILLIGRAM(S): at 22:47

## 2025-04-21 NOTE — ED ADULT NURSE NOTE - NSFALLUNIVINTERV_ED_ALL_ED
Bed/Stretcher in lowest position, wheels locked, appropriate side rails in place/Call bell, personal items and telephone in reach/Instruct patient to call for assistance before getting out of bed/chair/stretcher/Non-slip footwear applied when patient is off stretcher/[place] to call system/Physically safe environment - no spills, clutter or unnecessary equipment/Purposeful proactive rounding/Room/bathroom lighting operational, light cord in reach

## 2025-04-21 NOTE — ED ADULT TRIAGE NOTE - CHIEF COMPLAINT QUOTE
got a pacemaker on tuesday. now c/o nausea/vomiting and diarrhea. dialysis patient, TThSat last treatment saturday.

## 2025-04-21 NOTE — ED ADULT NURSE NOTE - OBJECTIVE STATEMENT
Pt came to ED a&o x 4 c/o of vomiting  that started this morning and abdominal pain. Pt reports having a pacemaker done last week and has HX of afib on AC. Pt reports being on dialysis T/TH/Sat.  Denies any other complaints at this time. No ADS noted.

## 2025-04-22 VITALS
HEART RATE: 69 BPM | RESPIRATION RATE: 18 BRPM | OXYGEN SATURATION: 93 % | SYSTOLIC BLOOD PRESSURE: 204 MMHG | DIASTOLIC BLOOD PRESSURE: 74 MMHG

## 2025-04-22 PROCEDURE — 93005 ELECTROCARDIOGRAM TRACING: CPT

## 2025-04-22 PROCEDURE — 74019 RADEX ABDOMEN 2 VIEWS: CPT

## 2025-04-22 PROCEDURE — 99285 EMERGENCY DEPT VISIT HI MDM: CPT | Mod: 25

## 2025-04-22 PROCEDURE — 85025 COMPLETE CBC W/AUTO DIFF WBC: CPT

## 2025-04-22 PROCEDURE — 80053 COMPREHEN METABOLIC PANEL: CPT

## 2025-04-22 PROCEDURE — 96374 THER/PROPH/DIAG INJ IV PUSH: CPT | Mod: XU

## 2025-04-22 PROCEDURE — 74019 RADEX ABDOMEN 2 VIEWS: CPT | Mod: 26

## 2025-04-22 PROCEDURE — 96375 TX/PRO/DX INJ NEW DRUG ADDON: CPT | Mod: XU

## 2025-04-22 PROCEDURE — 83735 ASSAY OF MAGNESIUM: CPT

## 2025-04-22 PROCEDURE — 36415 COLL VENOUS BLD VENIPUNCTURE: CPT

## 2025-04-22 PROCEDURE — 74176 CT ABD & PELVIS W/O CONTRAST: CPT | Mod: 26

## 2025-04-22 PROCEDURE — 83690 ASSAY OF LIPASE: CPT

## 2025-04-22 PROCEDURE — 74176 CT ABD & PELVIS W/O CONTRAST: CPT | Mod: MC

## 2025-04-22 RX ORDER — ONDANSETRON HCL/PF 4 MG/2 ML
1 VIAL (ML) INJECTION
Qty: 12 | Refills: 0
Start: 2025-04-22 | End: 2025-04-24

## 2025-04-22 RX ADMIN — Medication 0.3 MILLIGRAM(S): at 05:55

## 2025-04-22 NOTE — ED PROVIDER NOTE - PHYSICAL EXAMINATION
Constitutional: Awake, Alert, actively vomiting  HEAD: Normocephalic, atraumatic.   EYES: PERRL, EOM intact, conjunctiva and sclera are clear bilaterally.  ENT: External ears normal. No rhinorrhea, no tracheal deviation   NECK: Supple, non-tender  CARDIOVASCULAR: regular rate and rhythm.  RESPIRATORY: Normal respiratory effort; breath sounds CTAB, no wheezes, rhonchi, or rales. Speaking in full sentences. No accessory muscle use.   ABDOMEN: Soft; diffuse TTP, non-distended. No rebound or guarding.   MSK:  no lower extremity edema, no deformities  SKIN: Warm, dry  NEURO: A&O x3. Sensory and motor functions are grossly intact. Speech is normal. No facial droop.  PSYCH: Appearance and judgement seem appropriate for gender and age.

## 2025-04-22 NOTE — ED PROVIDER NOTE - NSFOLLOWUPINSTRUCTIONS_ED_ALL_ED_FT
Nausea and Vomiting, Adult  Nausea is the feeling that you have an upset stomach or that you are about to vomit. As nausea gets worse, it can lead to vomiting. Vomiting is when stomach contents forcefully come out of your mouth as a result of nausea. Vomiting can make you feel weak and cause you to become dehydrated.    Dehydration can make you feel tired and thirsty, cause you to have a dry mouth, and decrease how often you urinate. Older adults and people with other diseases or a weak disease-fighting system (immune system) are at higher risk for dehydration. It is important to treat your nausea and vomiting as told by your health care provider.    Follow these instructions at home:  Watch your symptoms for any changes. Tell your health care provider about them.    Eating and drinking    A bottle of clear fruit juice and glass of water.  A sign showing that a person should not drink alcohol.  Take an oral rehydration solution (ORS). This is a drink that is sold at pharmacies and retail stores.  Drink clear fluids slowly and in small amounts as you are able. Clear fluids include water, ice chips, low-calorie sports drinks, and fruit juice that has water added (diluted fruit juice).  Eat bland, easy-to-digest foods in small amounts as you are able. These foods include bananas, applesauce, rice, lean meats, toast, and crackers.  Avoid fluids that contain a lot of sugar or caffeine, such as energy drinks, sports drinks, and soda.  Avoid alcohol.  Avoid spicy or fatty foods.  General instructions    Take over-the-counter and prescription medicines only as told by your health care provider.  Drink enough fluid to keep your urine pale yellow.  Wash your hands often using soap and water for at least 20 seconds. If soap and water are not available, use hand .  Make sure that everyone in your household washes their hands well and often.  Rest at home while you recover.  Watch your condition for any changes.  Take slow and deep breaths when you feel nauseous.  Keep all follow-up visits. This is important.  Contact a health care provider if:  Your symptoms get worse.  You have new symptoms.  You have a fever.  You cannot drink fluids without vomiting.  Your nausea does not go away after 2 days.  You feel light-headed or dizzy.  You have a headache.  You have muscle cramps.  You have a rash.  You have pain while urinating.  Get help right away if:  You have pain in your chest, neck, arm, or jaw.  You feel extremely weak or you faint.  You have persistent vomiting.  You have vomit that is bright red or looks like black coffee grounds.  You have bloody or black stools (feces) or stools that look like tar.  You have a severe headache, a stiff neck, or both.  You have severe pain, cramping, or bloating in your abdomen.  You have difficulty breathing, or you are breathing very quickly.  Your heart is beating very quickly.  Your skin feels cold and clammy.  You feel confused.  You have signs of dehydration, such as:  Dark urine, very little urine, or no urine.  Cracked lips.  Dry mouth.  Sunken eyes.  Sleepiness.  Weakness.  These symptoms may be an emergency. Get help right away. Call 911.  Do not wait to see if the symptoms will go away.  Do not drive yourself to the hospital.  Summary  Nausea is the feeling that you have an upset stomach or that you are about to vomit. As nausea gets worse, it can lead to vomiting. Vomiting can make you feel weak and cause you to become dehydrated.  Follow instructions from your health care provider about eating and drinking to prevent dehydration.  Take over-the-counter and prescription medicines only as told by your health care provider.  Contact your health care provider if your symptoms get worse, or you have new symptoms.  Keep all follow-up visits. This is important.  This information is not intended to replace advice given to you by your health care provider. Make sure you discuss any questions you have with your health care provider. Nausea and Vomiting, Adult  Nausea is the feeling that you have an upset stomach or that you are about to vomit. As nausea gets worse, it can lead to vomiting. Vomiting is when stomach contents forcefully come out of your mouth as a result of nausea. Vomiting can make you feel weak and cause you to become dehydrated.    Dehydration can make you feel tired and thirsty, cause you to have a dry mouth, and decrease how often you urinate. Older adults and people with other diseases or a weak disease-fighting system (immune system) are at higher risk for dehydration. It is important to treat your nausea and vomiting as told by your health care provider.    Follow these instructions at home:  Watch your symptoms for any changes. Tell your health care provider about them.    Eating and drinking    A bottle of clear fruit juice and glass of water.  A sign showing that a person should not drink alcohol.  Take an oral rehydration solution (ORS). This is a drink that is sold at pharmacies and retail stores.  Drink clear fluids slowly and in small amounts as you are able. Clear fluids include water, ice chips, low-calorie sports drinks, and fruit juice that has water added (diluted fruit juice).  Eat bland, easy-to-digest foods in small amounts as you are able. These foods include bananas, applesauce, rice, lean meats, toast, and crackers.  Avoid fluids that contain a lot of sugar or caffeine, such as energy drinks, sports drinks, and soda.  Avoid alcohol.  Avoid spicy or fatty foods.  General instructions    Take over-the-counter and prescription medicines only as told by your health care provider.  Drink enough fluid to keep your urine pale yellow.  Wash your hands often using soap and water for at least 20 seconds. If soap and water are not available, use hand .  Make sure that everyone in your household washes their hands well and often.  Rest at home while you recover.  Watch your condition for any changes.  Take slow and deep breaths when you feel nauseous.  Keep all follow-up visits. This is important.  Contact a health care provider if:  Your symptoms get worse.  You have new symptoms.  You have a fever.  You cannot drink fluids without vomiting.  Your nausea does not go away after 2 days.  You feel light-headed or dizzy.  You have a headache.  You have muscle cramps.  You have a rash.  You have pain while urinating.  Get help right away if:  You have pain in your chest, neck, arm, or jaw.  You feel extremely weak or you faint.  You have persistent vomiting.  You have vomit that is bright red or looks like black coffee grounds.  You have bloody or black stools (feces) or stools that look like tar.  You have a severe headache, a stiff neck, or both.  You have severe pain, cramping, or bloating in your abdomen.  You have difficulty breathing, or you are breathing very quickly.  Your heart is beating very quickly.  Your skin feels cold and clammy.  You feel confused.  You have signs of dehydration, such as:  Dark urine, very little urine, or no urine.  Cracked lips.  Dry mouth.  Sunken eyes.  Sleepiness.  Weakness.  These symptoms may be an emergency. Get help right away. Call 911.  Do not wait to see if the symptoms will go away.  Do not drive yourself to the hospital.  Summary  Nausea is the feeling that you have an upset stomach or that you are about to vomit. As nausea gets worse, it can lead to vomiting. Vomiting can make you feel weak and cause you to become dehydrated.  Follow instructions from your health care provider about eating and drinking to prevent dehydration.  Take over-the-counter and prescription medicines only as told by your health care provider.  Contact your health care provider if your symptoms get worse, or you have new symptoms.  Keep all follow-up visits. This is important.  This information is not intended to replace advice given to you by your health care provider. Make sure you discuss any questions you have with your health care provider.        Ileus       Ileus is a condition that happens when the intestines, which are also called bowels, stop working correctly. The intestines are hollow organs that digest food after the food leaves the stomach. These organs are long, muscular tubes that connect the stomach to the rectum. When ileus occurs, the muscular contractions that cause food to move through the intestines do not happen as they normally would.    If the intestines stop working, food cannot pass through to get digested. This condition is a serious problem that usually requires hospitalization. It can cause symptoms such as nausea, abdominal pain, and bloating. Ileus can last from a few hours to a few days.    What are the causes?  This condition may be caused by:    Surgery on the abdomen.  An infection or inflammation in the abdomen. This includes inflammation of the lining of the abdomen (peritonitis).  Infection or inflammation in other parts of the body, such as pneumonia or pancreatitis.  Passage of gallstones or kidney stones.  Damage to the nerves or blood vessels that go to the intestines.  A collection of blood within the abdominal cavity.  Imbalance in the salts in the blood (electrolytes).  Injury to the brain or spinal cord.  Medicines. Many medicines, including strong pain medicines, can cause ileus or make it worse.    If the intestines stop working because of a blockage, that is a different condition that is called a bowel obstruction.    What are the signs or symptoms?  Symptoms of this condition include:    Bloating of the abdomen.  Pain or discomfort in the abdomen.  Poor appetite.  Nausea and vomiting.  Lack of normal bowel sounds, such as "growling" in the stomach.    How is this diagnosed?  This condition may be diagnosed with:    A physical exam and medical history.  X-rays or a CT scan of the abdomen.    You may also have other tests to help find the cause of the condition.    How is this treated?  This condition may be treated by:    Resting the intestines until they start to work again. This is often done by:    Stopping oral intake of food and drink. You will be given fluid through an IV to prevent dehydration.  Placing a small tube (nasogastric tube or NG tube) that is passed through your nose and into your stomach. The tube is attached to a suction device and keeps the stomach emptied out. This allows the bowels to rest and helps to reduce nausea and vomiting.  Correcting any electrolyte imbalance by giving supplements in the IV fluid.  Stopping any medicines that might make ileus worse.  Treating any condition that may have caused ileus.    Follow these instructions at home:      Eating and drinking     Follow instructions from your health care provider about:    What to eat and drink. You may be told to start eating a bland diet. Over time, you may slowly resume a more normal, healthy diet.  How much to eat and drink. You should eat small meals often and stop eating when you feel full.  Avoid alcohol.        General instructions    Take over-the-counter and prescription medicines only as told by your health care provider.  Rest as told by your health care provider.  Avoid sitting for a long time without moving. Get up to take short walks every 1–2 hours. Ask for help if you feel weak or unsteady.  Keep all follow-up visits as told by your health care provider. This is important.    Contact a health care provider if:  You have nausea, vomiting, or abdominal discomfort.  You have a fever.    Get help right away if:  You have severe abdominal pain or bloating.  You cannot eat or drink without vomiting.    Summary  Ileus is a condition that happens when the intestines, which are also called bowels, stop working correctly.  When ileus occurs, the muscular contractions that cause food to move through the intestines do not happen as they normally would.  Ileus can cause symptoms such as nausea, abdominal pain, and bloating.  Treatment may involve getting IV fluids and having a nasogastric tube placed to keep your stomach emptied out until the intestines start working again.    ADDITIONAL NOTES AND INSTRUCTIONS    Please follow up with your Primary MD in 24-48 hr.  Seek immediate medical care for any new/worsening signs or symptoms.     Document Released: 12/20/2004 Document Revised: 4/15/2019 Document Reviewed: 4/15/2019  [employer] Patient Education ©2019 [employer] This information is not intended to replace advice given to you by your health care provider. Make sure you discuss any questions you have with your health care provider.

## 2025-04-22 NOTE — ED PROVIDER NOTE - PATIENT PORTAL LINK FT
You can access the FollowMyHealth Patient Portal offered by [facility] by registering at the following website: [email or web address]. By joining [employer]’s FollowMyHealth portal, you will also be able to view your health information using other applications (apps) compatible with our system.

## 2025-04-22 NOTE — ED PROVIDER NOTE - CARE PLAN
Principal Discharge DX:	Nausea and vomiting  Secondary Diagnosis:	ESRD on dialysis   1 Principal Discharge DX:	Nausea and vomiting  Secondary Diagnosis:	ESRD on dialysis  Secondary Diagnosis:	Hypertension

## 2025-04-22 NOTE — ED PROVIDER NOTE - OBJECTIVE STATEMENT
Patient with a past medical history of end-stage renal disease on dialysis Tuesday Thursday Saturday with last dialysis on Saturday, A-fib status post ablation and [place], and ischemic bowel status post resection, recent pacemaker placement is presenting with concern for abdominal discomfort, nausea vomiting with some diarrhea.  States that he deals with chronic diarrhea but this is due to his prior surgeries.  However today he developed episodes of nausea with vomiting some abdominal discomfort.  States that he is nervous that he may not be able to get dialysis tomorrow so he came to ED today for evaluation.  No chest pain or shortness of breath.  still makes urine.

## 2025-04-22 NOTE — ED PROVIDER NOTE - PROGRESS NOTE DETAILS
Patient reevaluated, has had significant improvement in his symptoms at this time.  Abdominal tenderness has resolved.  No longer with vomiting after medication.  Based on CT scan results, will obtain CT with oral contrast and reevaluate afterwards.  Naman Sarmiento MD.

## 2025-04-22 NOTE — ED PROVIDER NOTE - CLINICAL SUMMARY MEDICAL DECISION MAKING FREE TEXT BOX
ECG per my independent interpretation shows a paced rhythm with a rate of 66, left axis deviation and QTc 492.  With patient's multiple surgeries, concern for obstruction versus possible gastrointestinal illness.  Will check for electrolyte abnormality as well with his history of end-stage renal disease and nausea with vomiting.  Patient appears very comfortable at this time.  Plan on lab work, imaging, medication reassessment. ECG per my independent interpretation shows a paced rhythm with a rate of 66, left axis deviation and QTc 492.  With patient's multiple surgeries, concern for obstruction versus possible gastrointestinal illness.  Will check for electrolyte abnormality as well with his history of end-stage renal disease and nausea with vomiting.  Patient appears very comfortable at this time.  Plan on lab work, imaging, medication reassessment.    Patient received in signout from Dr. Sarmiento pending repeat CAT scan.  CAT scan report is noted.  Patient resting comfortably without abdominal pain or further nausea/vomiting.  Will obain x-ray in several hours and if contrast appears to be moving to colon patient can be discharged for outpatient follow-up ECG per my independent interpretation shows a paced rhythm with a rate of 66, left axis deviation and QTc 492.  With patient's multiple surgeries, concern for obstruction versus possible gastrointestinal illness.  Will check for electrolyte abnormality as well with his history of end-stage renal disease and nausea with vomiting.  Patient appears very comfortable at this time.  Plan on lab work, imaging, medication reassessment.    Patient received in sign-out from Dr. Sarmiento pending repeat CAT scan.  CAT scan report is noted.  Patient resting comfortably without abdominal pain or further nausea/vomiting.  Will obain x-ray in several hours and if contrast appears to be moving to colon patient can be discharged for outpatient follow-up    Patient re-evaluated and resting comfortably. Patient's abdomen is noted to be soft, non tender, nondistended. Patient endorses that he has been able to pass gas while in the ED. No indication of acute bowel obstruction at this time. ECG per my independent interpretation shows a paced rhythm with a rate of 66, left axis deviation and QTc 492.  With patient's multiple surgeries, concern for obstruction versus possible gastrointestinal illness.  Will check for electrolyte abnormality as well with his history of end-stage renal disease and nausea with vomiting.  Patient appears very comfortable at this time.  Plan on lab work, imaging, medication reassessment.    Patient received in sign-out from Dr. Sarmiento pending repeat CAT scan.  CAT scan report is noted.  Patient resting comfortably without abdominal pain or further nausea/vomiting.  Will obtain x-ray in several hours and if contrast appears to be moving to colon patient can be discharged for outpatient follow-up    Patient re-evaluated and resting comfortably. Patient's abdomen is noted to be soft, non tender, nondistended. Patient endorses that he has been able to pass gas while in the ED. No indication of acute bowel obstruction at this time. BP is as noted but patient is due to dialysis this morning.

## 2025-04-28 ENCOUNTER — INPATIENT (INPATIENT)
Facility: HOSPITAL | Age: 65
LOS: 0 days | Discharge: HOME | DRG: 391 | End: 2025-04-29
Attending: INTERNAL MEDICINE | Admitting: HOSPITALIST
Payer: COMMERCIAL

## 2025-04-28 VITALS
SYSTOLIC BLOOD PRESSURE: 205 MMHG | HEIGHT: 66 IN | OXYGEN SATURATION: 97 % | HEART RATE: 95 BPM | RESPIRATION RATE: 17 BRPM | WEIGHT: 135.58 LBS | TEMPERATURE: 99 F | DIASTOLIC BLOOD PRESSURE: 98 MMHG

## 2025-04-28 DIAGNOSIS — Z95.2 PRESENCE OF PROSTHETIC HEART VALVE: Chronic | ICD-10-CM

## 2025-04-28 DIAGNOSIS — Z95.818 PRESENCE OF OTHER CARDIAC IMPLANTS AND GRAFTS: Chronic | ICD-10-CM

## 2025-04-28 DIAGNOSIS — I77.0 ARTERIOVENOUS FISTULA, ACQUIRED: Chronic | ICD-10-CM

## 2025-04-28 DIAGNOSIS — Z94.0 KIDNEY TRANSPLANT STATUS: Chronic | ICD-10-CM

## 2025-04-28 DIAGNOSIS — Z86.79 PERSONAL HISTORY OF OTHER DISEASES OF THE CIRCULATORY SYSTEM: Chronic | ICD-10-CM

## 2025-04-28 DIAGNOSIS — Z95.1 PRESENCE OF AORTOCORONARY BYPASS GRAFT: Chronic | ICD-10-CM

## 2025-04-28 DIAGNOSIS — Z90.49 ACQUIRED ABSENCE OF OTHER SPECIFIED PARTS OF DIGESTIVE TRACT: Chronic | ICD-10-CM

## 2025-04-28 LAB
ALBUMIN SERPL ELPH-MCNC: 4.2 G/DL — SIGNIFICANT CHANGE UP (ref 3.3–5.2)
ALP SERPL-CCNC: 91 U/L — SIGNIFICANT CHANGE UP (ref 40–120)
ALT FLD-CCNC: 15 U/L — SIGNIFICANT CHANGE UP
ANION GAP SERPL CALC-SCNC: 17 MMOL/L — SIGNIFICANT CHANGE UP (ref 5–17)
APTT BLD: 34 SEC — SIGNIFICANT CHANGE UP (ref 26.1–36.8)
AST SERPL-CCNC: 34 U/L — SIGNIFICANT CHANGE UP
BASOPHILS # BLD AUTO: 0.04 K/UL — SIGNIFICANT CHANGE UP (ref 0–0.2)
BASOPHILS NFR BLD AUTO: 0.5 % — SIGNIFICANT CHANGE UP (ref 0–2)
BILIRUB SERPL-MCNC: 2.1 MG/DL — HIGH (ref 0.4–2)
BUN SERPL-MCNC: 41.5 MG/DL — HIGH (ref 8–20)
CALCIUM SERPL-MCNC: 9.1 MG/DL — SIGNIFICANT CHANGE UP (ref 8.4–10.5)
CHLORIDE SERPL-SCNC: 97 MMOL/L — SIGNIFICANT CHANGE UP (ref 96–108)
CO2 SERPL-SCNC: 21 MMOL/L — LOW (ref 22–29)
CREAT SERPL-MCNC: 5.39 MG/DL — HIGH (ref 0.5–1.3)
EGFR: 11 ML/MIN/1.73M2 — LOW
EGFR: 11 ML/MIN/1.73M2 — LOW
EOSINOPHIL # BLD AUTO: 0.1 K/UL — SIGNIFICANT CHANGE UP (ref 0–0.5)
EOSINOPHIL NFR BLD AUTO: 1.2 % — SIGNIFICANT CHANGE UP (ref 0–6)
GAS PNL BLDV: SIGNIFICANT CHANGE UP
GLUCOSE SERPL-MCNC: 94 MG/DL — SIGNIFICANT CHANGE UP (ref 70–99)
HCT VFR BLD CALC: 30.4 % — LOW (ref 39–50)
HGB BLD-MCNC: 9.6 G/DL — LOW (ref 13–17)
IMM GRANULOCYTES # BLD AUTO: 0.03 K/UL — SIGNIFICANT CHANGE UP (ref 0–0.07)
IMM GRANULOCYTES NFR BLD AUTO: 0.4 % — SIGNIFICANT CHANGE UP (ref 0–0.9)
INR BLD: 1.37 RATIO — HIGH (ref 0.85–1.16)
LIDOCAIN IGE QN: 34 U/L — SIGNIFICANT CHANGE UP (ref 22–51)
LYMPHOCYTES # BLD AUTO: 1.06 K/UL — SIGNIFICANT CHANGE UP (ref 1–3.3)
LYMPHOCYTES NFR BLD AUTO: 12.5 % — LOW (ref 13–44)
MCHC RBC-ENTMCNC: 30.6 PG — SIGNIFICANT CHANGE UP (ref 27–34)
MCHC RBC-ENTMCNC: 31.6 G/DL — LOW (ref 32–36)
MCV RBC AUTO: 96.8 FL — SIGNIFICANT CHANGE UP (ref 80–100)
MONOCYTES # BLD AUTO: 0.5 K/UL — SIGNIFICANT CHANGE UP (ref 0–0.9)
MONOCYTES NFR BLD AUTO: 5.9 % — SIGNIFICANT CHANGE UP (ref 2–14)
NEUTROPHILS # BLD AUTO: 6.75 K/UL — SIGNIFICANT CHANGE UP (ref 1.8–7.4)
NEUTROPHILS NFR BLD AUTO: 79.5 % — HIGH (ref 43–77)
NRBC # BLD AUTO: 0 K/UL — SIGNIFICANT CHANGE UP (ref 0–0)
NRBC # FLD: 0 K/UL — SIGNIFICANT CHANGE UP (ref 0–0)
NRBC BLD AUTO-RTO: 0 /100 WBCS — SIGNIFICANT CHANGE UP (ref 0–0)
PLATELET # BLD AUTO: 136 K/UL — LOW (ref 150–400)
PMV BLD: 11.5 FL — SIGNIFICANT CHANGE UP (ref 7–13)
POTASSIUM SERPL-MCNC: 5 MMOL/L — SIGNIFICANT CHANGE UP (ref 3.5–5.3)
POTASSIUM SERPL-SCNC: 5 MMOL/L — SIGNIFICANT CHANGE UP (ref 3.5–5.3)
PROT SERPL-MCNC: 7.4 G/DL — SIGNIFICANT CHANGE UP (ref 6.6–8.7)
PROTHROM AB SERPL-ACNC: 15.4 SEC — HIGH (ref 9.9–13.4)
RBC # BLD: 3.14 M/UL — LOW (ref 4.2–5.8)
RBC # FLD: 14.4 % — SIGNIFICANT CHANGE UP (ref 10.3–14.5)
SODIUM SERPL-SCNC: 135 MMOL/L — SIGNIFICANT CHANGE UP (ref 135–145)
WBC # BLD: 8.48 K/UL — SIGNIFICANT CHANGE UP (ref 3.8–10.5)
WBC # FLD AUTO: 8.48 K/UL — SIGNIFICANT CHANGE UP (ref 3.8–10.5)

## 2025-04-28 PROCEDURE — 99223 1ST HOSP IP/OBS HIGH 75: CPT

## 2025-04-28 PROCEDURE — 99285 EMERGENCY DEPT VISIT HI MDM: CPT

## 2025-04-28 PROCEDURE — 74177 CT ABD & PELVIS W/CONTRAST: CPT | Mod: 26

## 2025-04-28 RX ORDER — ACETAMINOPHEN 500 MG/5ML
650 LIQUID (ML) ORAL EVERY 6 HOURS
Refills: 0 | Status: DISCONTINUED | OUTPATIENT
Start: 2025-04-28 | End: 2025-04-29

## 2025-04-28 RX ORDER — OXYCODONE HYDROCHLORIDE 30 MG/1
10 TABLET ORAL EVERY 6 HOURS
Refills: 0 | Status: DISCONTINUED | OUTPATIENT
Start: 2025-04-28 | End: 2025-04-29

## 2025-04-28 RX ORDER — ATORVASTATIN CALCIUM 80 MG/1
40 TABLET, FILM COATED ORAL AT BEDTIME
Refills: 0 | Status: DISCONTINUED | OUTPATIENT
Start: 2025-04-28 | End: 2025-04-29

## 2025-04-28 RX ORDER — ACETAMINOPHEN 500 MG/5ML
1000 LIQUID (ML) ORAL ONCE
Refills: 0 | Status: COMPLETED | OUTPATIENT
Start: 2025-04-28 | End: 2025-04-28

## 2025-04-28 RX ORDER — TORSEMIDE 10 MG
20 TABLET ORAL DAILY
Refills: 0 | Status: DISCONTINUED | OUTPATIENT
Start: 2025-04-28 | End: 2025-04-29

## 2025-04-28 RX ORDER — CARVEDILOL 3.12 MG/1
6.25 TABLET, FILM COATED ORAL EVERY 12 HOURS
Refills: 0 | Status: DISCONTINUED | OUTPATIENT
Start: 2025-04-28 | End: 2025-04-29

## 2025-04-28 RX ORDER — LEVETIRACETAM 10 MG/ML
500 INJECTION, SOLUTION INTRAVENOUS
Refills: 0 | Status: DISCONTINUED | OUTPATIENT
Start: 2025-04-28 | End: 2025-04-29

## 2025-04-28 RX ORDER — HYDROMORPHONE/SOD CHLOR,ISO/PF 2 MG/10 ML
0.5 SYRINGE (ML) INJECTION ONCE
Refills: 0 | Status: DISCONTINUED | OUTPATIENT
Start: 2025-04-28 | End: 2025-04-28

## 2025-04-28 RX ORDER — ASPIRIN 325 MG
81 TABLET ORAL DAILY
Refills: 0 | Status: DISCONTINUED | OUTPATIENT
Start: 2025-04-28 | End: 2025-04-29

## 2025-04-28 RX ORDER — ONDANSETRON HCL/PF 4 MG/2 ML
4 VIAL (ML) INJECTION THREE TIMES A DAY
Refills: 0 | Status: DISCONTINUED | OUTPATIENT
Start: 2025-04-28 | End: 2025-04-28

## 2025-04-28 RX ORDER — AMLODIPINE BESYLATE 10 MG/1
10 TABLET ORAL DAILY
Refills: 0 | Status: DISCONTINUED | OUTPATIENT
Start: 2025-04-28 | End: 2025-04-29

## 2025-04-28 RX ORDER — SEVELAMER HYDROCHLORIDE 800 MG/1
1600 TABLET ORAL
Refills: 0 | Status: DISCONTINUED | OUTPATIENT
Start: 2025-04-28 | End: 2025-04-29

## 2025-04-28 RX ORDER — INFLUENZA A VIRUS A/IDAHO/07/2018 (H1N1) ANTIGEN (MDCK CELL DERIVED, PROPIOLACTONE INACTIVATED, INFLUENZA A VIRUS A/INDIANA/08/2018 (H3N2) ANTIGEN (MDCK CELL DERIVED, PROPIOLACTONE INACTIVATED), INFLUENZA B VIRUS B/SINGAPORE/INFTT-16-0610/2016 ANTIGEN (MDCK CELL DERIVED, PROPIOLACTONE INACTIVATED), INFLUENZA B VIRUS B/IOWA/06/2017 ANTIGEN (MDCK CELL DERIVED, PROPIOLACTONE INACTIVATED) 15; 15; 15; 15 UG/.5ML; UG/.5ML; UG/.5ML; UG/.5ML
0.5 INJECTION, SUSPENSION INTRAMUSCULAR ONCE
Refills: 0 | Status: DISCONTINUED | OUTPATIENT
Start: 2025-04-28 | End: 2025-04-29

## 2025-04-28 RX ORDER — ONDANSETRON HCL/PF 4 MG/2 ML
4 VIAL (ML) INJECTION ONCE
Refills: 0 | Status: COMPLETED | OUTPATIENT
Start: 2025-04-28 | End: 2025-04-28

## 2025-04-28 RX ORDER — ONDANSETRON HCL/PF 4 MG/2 ML
4 VIAL (ML) INJECTION EVERY 6 HOURS
Refills: 0 | Status: DISCONTINUED | OUTPATIENT
Start: 2025-04-28 | End: 2025-04-29

## 2025-04-28 RX ORDER — OXYCODONE HYDROCHLORIDE 30 MG/1
5 TABLET ORAL EVERY 6 HOURS
Refills: 0 | Status: DISCONTINUED | OUTPATIENT
Start: 2025-04-28 | End: 2025-04-29

## 2025-04-28 RX ORDER — TAMSULOSIN HYDROCHLORIDE 0.4 MG/1
0.4 CAPSULE ORAL AT BEDTIME
Refills: 0 | Status: DISCONTINUED | OUTPATIENT
Start: 2025-04-28 | End: 2025-04-29

## 2025-04-28 RX ORDER — ISOSORBIDE MONONITRATE 60 MG/1
60 TABLET, EXTENDED RELEASE ORAL DAILY
Refills: 0 | Status: DISCONTINUED | OUTPATIENT
Start: 2025-04-28 | End: 2025-04-29

## 2025-04-28 RX ADMIN — Medication 0.5 MILLIGRAM(S): at 13:23

## 2025-04-28 RX ADMIN — Medication 100 MILLIGRAM(S): at 17:26

## 2025-04-28 RX ADMIN — ISOSORBIDE MONONITRATE 60 MILLIGRAM(S): 60 TABLET, EXTENDED RELEASE ORAL at 13:31

## 2025-04-28 RX ADMIN — Medication 81 MILLIGRAM(S): at 13:31

## 2025-04-28 RX ADMIN — ATORVASTATIN CALCIUM 40 MILLIGRAM(S): 80 TABLET, FILM COATED ORAL at 21:10

## 2025-04-28 RX ADMIN — Medication 40 MILLIGRAM(S): at 13:31

## 2025-04-28 RX ADMIN — Medication 4 MILLIGRAM(S): at 11:46

## 2025-04-28 RX ADMIN — TAMSULOSIN HYDROCHLORIDE 0.4 MILLIGRAM(S): 0.4 CAPSULE ORAL at 21:10

## 2025-04-28 RX ADMIN — LEVETIRACETAM 500 MILLIGRAM(S): 10 INJECTION, SOLUTION INTRAVENOUS at 19:52

## 2025-04-28 RX ADMIN — Medication 3 MILLILITER(S): at 11:45

## 2025-04-28 RX ADMIN — Medication 20 MILLIGRAM(S): at 13:30

## 2025-04-28 RX ADMIN — AMLODIPINE BESYLATE 10 MILLIGRAM(S): 10 TABLET ORAL at 13:31

## 2025-04-28 RX ADMIN — Medication 0.5 MILLIGRAM(S): at 23:17

## 2025-04-28 RX ADMIN — OXYCODONE HYDROCHLORIDE 10 MILLIGRAM(S): 30 TABLET ORAL at 17:01

## 2025-04-28 RX ADMIN — CARVEDILOL 6.25 MILLIGRAM(S): 3.12 TABLET, FILM COATED ORAL at 19:52

## 2025-04-28 RX ADMIN — Medication 400 MILLIGRAM(S): at 11:46

## 2025-04-28 RX ADMIN — Medication 0.3 MILLIGRAM(S): at 17:26

## 2025-04-28 NOTE — PATIENT PROFILE ADULT - FALL HARM RISK - UNIVERSAL INTERVENTIONS
Bed in lowest position, wheels locked, appropriate side rails in place/Call bell, personal items and telephone in reach/Instruct patient to call for assistance before getting out of bed or chair/Non-slip footwear when patient is out of bed/[place] to call system/Physically safe environment - no spills, clutter or unnecessary equipment/Purposeful Proactive Rounding/Room/bathroom lighting operational, light cord in reach

## 2025-04-28 NOTE — H&P ADULT - NSHPREVIEWOFSYSTEMS_GEN_ALL_CORE
REVIEW OF SYSTEMS:    CONSTITUTIONAL: pain  EYES: No eye pain, visual disturbances, or discharge  ENMT:  No difficulty hearing, tinnitus, vertigo; No sinus or throat pain  NECK: No pain or stiffness  BREASTS: No pain, masses, or nipple discharge  RESPIRATORY: No cough, wheezing, chills or hemoptysis; No shortness of breath  CARDIOVASCULAR: No chest pain, palpitations, dizziness, or leg swelling  GASTROINTESTINAL: nausea  GENITOURINARY: No dysuria, frequency, hematuria, or incontinence  NEUROLOGICAL: No headaches, memory loss, loss of strength, numbness, or tremors  SKIN: No itching, burning, rashes, or lesions   LYMPH NODES: No enlarged glands  ENDOCRINE: No heat or cold intolerance; No hair loss  MUSCULOSKELETAL: No joint pain or swelling; No muscle, back, or extremity pain  PSYCHIATRIC: No depression, anxiety, mood swings, or difficulty sleeping  HEME/LYMPH: No easy bruising, or bleeding gums  ALLERY AND IMMUNOLOGIC: No hives or eczema

## 2025-04-28 NOTE — ED ADULT NURSE NOTE - OBJECTIVE STATEMENT
worsening abd pain, N/V/D  x > 1 week. seen in  ED 1 week ago for the same. hx ischemic bowel and ESRD (last dialysis sat).- as per patient and Triage note . Pt seen and eval by provider, IV established , blood work obtained and sent it to the lab - awaiting results

## 2025-04-28 NOTE — ED PROVIDER NOTE - PROGRESS NOTE DETAILS
Patient will require CT abdomen pelvis with IV contrast and will require admission for dialysis secondary to contrast load as he was last dialyzed 2 days ago and not scheduled again for dialysis till Wednesday.  Patient seen and evaluated by nephrology/Dr. Victoria.  Case d/w Hospitalist and will admit

## 2025-04-28 NOTE — ED ADULT NURSE REASSESSMENT NOTE - NS ED NURSE REASSESS COMMENT FT1
Late entry : this nurse was on break , pt was taken to CT and from CT to HD . After returning from break ,this nurse calls HD to find pt there. Pt care endorsed to holding nurse Santoyo - nurse aware of pt currently , nurse aware she will be receiving pt from HD in approx 2 hrs

## 2025-04-28 NOTE — ED PROVIDER NOTE - GASTROINTESTINAL, MLM
Abdomen soft,   Positive mild lower abdominal tenderness to palpation, positive healed cicatrix, no rebound rigidity or guarding

## 2025-04-28 NOTE — H&P ADULT - ASSESSMENT
64 year old male with PMHx of Leadless Pacemaker via RFV access on 4/15/2025,  ESRD (HD T/Th/S right chest wall cath, LUE graft), Afib s/p ablation, LAAO (watchman), CAD s/p CABG x2, bioprosthetic AVR/MR repair, MV endocarditis 2023, type A aortic dissection s/p repair w/ ischemic bowel s/p resection, seizure disorder, hx substance abuse,  malfunction LUE graft presents from home with worsening abd pain associated with nausea which patient states has been ongoing for 3 months. Patient denies trauma to the area, recent travel or sick contacts.     Patient is planned for CT abd with iv contrast and HD post contrast.     #chronic abd pain - consulted Dr Pimentel to be seen in am   - oxy ir for med and severe pain     #esrd/ hyperphosphatemia  #anemia of chronic disease  -  - for hd today post contrast   - c.w sevelamar    #HTN/CAD/AVR/MR   - C/w amlodipine   - rTorsemide 20mg PO qd, Imdur 60 mg PO qd, clonidine 0.3 mg PO BID     Seizure disorder  -  Keppra 500mg PO BID     HLD  - c/w atorvastatin     Hx BPH  -Flomax    GERD  - C/w pantoprazole     DVT: scds, given thrombocytipenia  diet - renal     dispo - eventual home in 1-2 days post HD and pain consult

## 2025-04-28 NOTE — H&P ADULT - HISTORY OF PRESENT ILLNESS
Codi is a 64 year old male with PMHx of Leadless Pacemaker via RFV access on 4/15/2025,  ESRD (HD T/Th/S right chest wall cath, LUE graft), Afib s/p ablation, LAAO (watchman), CAD s/p CABG x2, bioprosthetic AVR/MR repair, MV endocarditis 2023, type A aortic dissection s/p repair w/ ischemic bowel s/p resection, seizure disorder, hx substance abuse,  malfunction LUE graft presents from home with worsening abd pain associated with nausea which patient states has been ongoing for 3 months. Patient denies trauma to the area, recent travel or sick contacts.     Patient is planned for CT abd with iv contrast and HD post contrast.

## 2025-04-28 NOTE — H&P ADULT - NSHPLABSRESULTS_GEN_ALL_CORE
9.6    8.48   )----------(  136       ( 28 Apr 2025 11:41 )               30.4      135    |  97     |  41.5   ----------------------------<  94         ( 28 Apr 2025 11:41 )  5.0     |  21.0   |  5.39     Ca    9.1        ( 28 Apr 2025 11:41 )    TPro  7.4    /  Alb  4.2    /  TBili  2.1    /  DBili  x      /  AST  34     /  ALT  15     /  AlkPhos  91     ( 28 Apr 2025 11:41 )    LIVER FUNCTIONS - ( 28 Apr 2025 11:41 )  Alb: 4.2 g/dL / Pro: 7.4 g/dL / ALK PHOS: 91 U/L / ALT: 15 U/L / AST: 34 U/L / GGT: x           PT/INR -  15.4 sec / 1.37 ratio   ( 28 Apr 2025 11:41 )       PTT -  34.0 sec   ( 28 Apr 2025 11:41 )  CAPILLARY BLOOD GLUCOSE        Urinalysis Basic - ( 28 Apr 2025 11:41 )    Color: x / Appearance: x / SG: x / pH: x  Gluc: 94 mg/dL / Ketone: x  / Bili: x / Urobili: x   Blood: x / Protein: x / Nitrite: x   Leuk Esterase: x / RBC: x / WBC x   Sq Epi: x / Non Sq Epi: x / Bacteria: x    ct abd - IMPRESSION: No evidence of bowel obstruction.

## 2025-04-28 NOTE — ED PROVIDER NOTE - CLINICAL SUMMARY MEDICAL DECISION MAKING FREE TEXT BOX
64-year-old male presents the ED complaining of recurrent lower abdominal pain.  Patient last seen in ED 6 days ago for the same.  Patient with history of  end-stage renal disease on hemodialysis  Tuesdays, Thursdays and Saturdays, ischemic bowel requiring colectomy 15 years ago with  subsequent reversal of colostomy.   patient states he has chronic diarrhea since his colectomy and takes Lomotil on a daily basis.   No reported blood or mucus. patient states this is the same pain he had 6 days ago for which she was seen in ED and had subsequent CT and subsequently discharged home.  Patient states pain and nausea vomiting started last evening.  Patient admits to not being seen in GI recently as he was followed by Dr. Feldman in the past who subsequently retired.  Patient denies any fever but does admit to associated chills.  No reported chest pain, shortness of breath, urinary symptoms or syncope.  And pain repeat CTs labs and reevaluate

## 2025-04-28 NOTE — ED PROVIDER NOTE - MUSCULOSKELETAL, MLM
Spine appears normal, range of motion is not limited, no muscle or joint tenderness,  positive AV fistula left upper extremity, positive thrill/bruit

## 2025-04-28 NOTE — ED PROVIDER NOTE - OBJECTIVE STATEMENT
64-year-old male presents the ED complaining of recurrent lower abdominal pain.  Patient last seen in ED 6 days ago for the same.  Patient with history of  end-stage renal disease on hemodialysis  Tuesdays, Thursdays and Saturdays, ischemic bowel requiring colectomy 15 years ago with  subsequent reversal of colostomy.   patient states he has chronic diarrhea since his colectomy and takes Lomotil on a daily basis.   No reported blood or mucus. patient states this is the same pain he had 6 days ago for which she was seen in ED and had subsequent CT and subsequently discharged home.  Patient states pain and nausea vomiting started last evening.  Patient admits to not being seen in GI recently as he was followed by Dr. Feldman in the past who subsequently retired.  Patient denies any fever but does admit to associated chills.  No reported chest pain, shortness of breath, urinary symptoms or syncope

## 2025-04-28 NOTE — ED ADULT TRIAGE NOTE - CHIEF COMPLAINT QUOTE
worsening abd pain, N/V/D  x > 1 week. seen in  ED 1 week ago for the same. hx ischemic bowel and ESRD (last dialysis sat).

## 2025-04-28 NOTE — PATIENT PROFILE ADULT - LEGAL HELP
I have seen the patient, reviewed the Resident's history and physical, assessment and plan. I have personally interviewed and examined the patient at bedside and: agree with the findings.     32y/o with gerd and gastroparesis s/p peg with j tube 3/24/23 and s/p robotic pyloromyotomy 5/19/23.  Her symptoms are improved and she is tolerating some solid foods.  She has moderate epigastric pain, moderate to severe nausea and no vomiting.  She has not been to the ER.  She stopped j tube feeds and she is no longer needing to vent her g tube.  She has lost 7lb since 5/19/23.  See student note for specifics and medications.      shows one narcotics rx in 2/2022 but none since.  She did get morphine in the ER but pain only improved for a short time prior to the last surgical procedure.     She is not able to work due to symptoms.     PE soft     Cbc, cmp basically ok  Ugi with small bowel 2022 reviewed, films viewed    -Delayed gastric emptying consistent with the clinical diagnosis of gastro paresis.    -Normal appearing small bowel.    -Questionable area in the cervical esophagus.  Would consider endoscopy.  Ct 2022 reviewed, films viewed    -No abnormality.    -Stomach appears ok  Ct 2023 (for peg tube site pain), reviewed, films viewed    1. Peg tube in the stomach, no abscess and appears appropriately placed to me    2. Previous cholecystectomy    3. There is a catheter in the left abdomen question if this is a drainage catheter    4. No bowel obstruction  Ges 2022 reviewed    -At 241 minutes, gastric emptying was noted to be 61%, c/w gastroparesis  Egd 2020 reviewed    - Normal esophagus.     - Gastritis. Biopsied.     - Normal examined duodenum.     - Biopsies were taken with a cold forceps for         histology in the second portion of the duodenum.   Ekg 2023 reviewed    -Normal    S/p pyloric procedure for gastroparesis with significant improvement in symptoms but weight loss.  She wanted both tubes removed but  since she is losing weight I suggested keeping the j tube for one more month.  Follow up 1 months and ges in 3 months.   no

## 2025-04-28 NOTE — ED ADULT NURSE NOTE - CARDIO ASSESSMENT
March 9, 2021      [address]      Dear Aimee Hence:    Our office has been trying to contact you to discuss your recent test results. Please contact our office at your earliest convenience at [phone].        As praneeth ---

## 2025-04-28 NOTE — CONSULT NOTE ADULT - SUBJECTIVE AND OBJECTIVE BOX
Patient is a 64y old  Male who presents with a chief complaint of  Abdominal  pain  with N&V..    HPI:   Hx of  abdominal pain, N&V, with prior symptoms  last  week. Recently in H  for  same. No  bleeding  noted. hx  chronic  diarrhe  on lomotil. prior colectomy  several years  ago with  temporary colostomy then. Hx  CRF on  HD with next treatment wednseday. No  fever noted. Hx og prior cva, no recent seizure. Hx valvular  heart disease with avr CABG.    PAST MEDICAL & SURGICAL HISTORY:  CHF (congestive heart failure)      CVA (cerebral vascular accident)      Seizures  last seizure 10 years ago      HTN (hypertension)      Hyperlipemia      COVID-19  october 2020      Atrial fibrillation      Former smoker      History of cocaine use  remote hx, currently sober      H/O aortic dissection  s/p repair (2010), surgery complicated by bowel ischemia s/p bowel resection and ostomy with reversal      H/O aortic valve insufficiency      Mitral regurgitation      GIB (gastrointestinal bleeding)      CAD (coronary artery disease)  s/p PCI 1998  and CABG x 2 11/2020      Chronic atrial fibrillation      Aneurysm of artery of upper extremity      Anemia of chronic disease      End stage renal disease      Myocardial infarction      COVID-19 vaccine series completed      Port-A-Cath in place      H/O colectomy      Status post double vessel coronary artery bypass  11/2020 Dr Butler      S/P AVR (aortic valve replacement)  (t)AVR 11/2020 Dr Butler      S/P MVR (mitral valve replacement)  (t)MVR 11/2020 Dr Butler      H/O aortic dissection  Type A; emergent repair 2010      AV fistula  LUE      History of renal transplant      Presence of Watchman left atrial appendage closure device       DM 2, [place], hypothyroidism, prior DVT and IVC filter; Cholecystectomy    FAMILY HISTORY:  FH: hypertension    Family history of cardiac disorder (Mother)  mother    [place]    Social History:Non smoker    MEDICATIONS  (STANDING):  HYDROmorphone  Injectable 0.5 milliGRAM(s) IV Push Once    MEDICATIONS  (PRN):   Meds reviewed    Allergies    penicillin (Other)        REVIEW OF SYSTEMS:    As above.    ALLERY AND IMMUNOLOGIC: No hives or eczema      Vital Signs Last 24 Hrs  T(C): 37.1 (28 Apr 2025 10:17), Max: 37.1 (28 Apr 2025 10:17)  T(F): 98.7 (28 Apr 2025 10:17), Max: 98.7 (28 Apr 2025 10:17)  HR: 95 (28 Apr 2025 10:17) (95 - 95)  BP: 205/98 (28 Apr 2025 10:17) (205/98 - 205/98)  BP(mean): --  RR: 17 (28 Apr 2025 10:17) (17 - 17)  SpO2: 97% (28 Apr 2025 10:17) (97% - 97%)    Parameters below as of 28 Apr 2025 10:17  Patient On (Oxygen Delivery Method): room air      Daily Height in cm: 167.64 (28 Apr 2025 10:17)    Daily     PHYSICAL EXAM:    GENERAL: appears chronically ill, oriented.  HEAD:  Atraumatic, Normocephalic  EYES: EOMI, PERRLA, conjunctiva and sclera clear  ENMT: No tonsillar erythema, exudates, or enlargement; Moist mucous membranes  NECK: Supple, neck  veins wnl  NERVOUS SYSTEM:  Alert & Oriented , Good concentration; Motor Strength wnl upper and lower extremities;  CHEST/LUNG: Clear to percussion bilaterally; No rales, rhonchi, wheezing, or rubs  HEART: Regular rate and rhythm; No  rubs, or gallops, sternal  scar, clear  valve  tones  ABDOMEN: Soft, Nontender, Nondistended; Bowel sounds present, large lower abdominal  scar  EXTREMITIES: No edema, bruit  left upper arm access; diffuse muscle wasting  LYMPH: No lymphadenopathy noted  SKIN: No rashes or lesions, pale, +tattoos      LABS:                        9.6    8.48  )-----------( 136      ( 28 Apr 2025 11:41 )             30.4     04-28    135  |  97  |  41.5[H]  ----------------------------<  94  5.0   |  21.0[L]  |  5.39[H]    Ca    9.1      28 Apr 2025 11:41    TPro  7.4  /  Alb  4.2  /  TBili  2.1[H]  /  DBili  x   /  AST  34  /  ALT  15  /  AlkPhos  91  04-28    PT/INR - ( 28 Apr 2025 11:41 )   PT: 15.4 sec;   INR: 1.37 ratio         PTT - ( 28 Apr 2025 11:41 )  PTT:34.0 sec  Urinalysis Basic - ( 28 Apr 2025 11:41 )    Color: x / Appearance: x / SG: x / pH: x  Gluc: 94 mg/dL / Ketone: x  / Bili: x / Urobili: x   Blood: x / Protein: x / Nitrite: x   Leuk Esterase: x / RBC: x / WBC x   Sq Epi: x / Non Sq Epi: x / Bacteria: x              RADIOLOGY & ADDITIONAL TESTS:

## 2025-04-29 ENCOUNTER — APPOINTMENT (OUTPATIENT)
Dept: CARDIOLOGY | Facility: CLINIC | Age: 65
End: 2025-04-29

## 2025-04-29 ENCOUNTER — TRANSCRIPTION ENCOUNTER (OUTPATIENT)
Age: 65
End: 2025-04-29

## 2025-04-29 VITALS — DIASTOLIC BLOOD PRESSURE: 61 MMHG | HEART RATE: 72 BPM | SYSTOLIC BLOOD PRESSURE: 112 MMHG

## 2025-04-29 LAB
ALBUMIN SERPL ELPH-MCNC: 3.8 G/DL — SIGNIFICANT CHANGE UP (ref 3.3–5.2)
ALP SERPL-CCNC: 86 U/L — SIGNIFICANT CHANGE UP (ref 40–120)
ALT FLD-CCNC: 11 U/L — SIGNIFICANT CHANGE UP
ANION GAP SERPL CALC-SCNC: 14 MMOL/L — SIGNIFICANT CHANGE UP (ref 5–17)
AST SERPL-CCNC: 17 U/L — SIGNIFICANT CHANGE UP
BASOPHILS # BLD AUTO: 0.04 K/UL — SIGNIFICANT CHANGE UP (ref 0–0.2)
BASOPHILS NFR BLD AUTO: 0.5 % — SIGNIFICANT CHANGE UP (ref 0–2)
BILIRUB SERPL-MCNC: 1.9 MG/DL — SIGNIFICANT CHANGE UP (ref 0.4–2)
BUN SERPL-MCNC: 30.1 MG/DL — HIGH (ref 8–20)
CALCIUM SERPL-MCNC: 8.3 MG/DL — LOW (ref 8.4–10.5)
CHLORIDE SERPL-SCNC: 99 MMOL/L — SIGNIFICANT CHANGE UP (ref 96–108)
CO2 SERPL-SCNC: 27 MMOL/L — SIGNIFICANT CHANGE UP (ref 22–29)
CREAT SERPL-MCNC: 4.7 MG/DL — HIGH (ref 0.5–1.3)
EGFR: 13 ML/MIN/1.73M2 — LOW
EGFR: 13 ML/MIN/1.73M2 — LOW
EOSINOPHIL # BLD AUTO: 0.64 K/UL — HIGH (ref 0–0.5)
EOSINOPHIL NFR BLD AUTO: 8.8 % — HIGH (ref 0–6)
FERRITIN SERPL-MCNC: 960 NG/ML — HIGH (ref 30–400)
GLUCOSE SERPL-MCNC: 99 MG/DL — SIGNIFICANT CHANGE UP (ref 70–99)
HCT VFR BLD CALC: 28 % — LOW (ref 39–50)
HGB BLD-MCNC: 8.8 G/DL — LOW (ref 13–17)
IMM GRANULOCYTES # BLD AUTO: 0.09 K/UL — HIGH (ref 0–0.07)
IMM GRANULOCYTES NFR BLD AUTO: 1.2 % — HIGH (ref 0–0.9)
IMMATURE RETICULOCYTE FRACTION %: 10.5 % — SIGNIFICANT CHANGE UP
IRON SATN MFR SERPL: 20 % — SIGNIFICANT CHANGE UP (ref 16–55)
IRON SATN MFR SERPL: 46 UG/DL — LOW (ref 59–158)
LYMPHOCYTES # BLD AUTO: 1.06 K/UL — SIGNIFICANT CHANGE UP (ref 1–3.3)
LYMPHOCYTES NFR BLD AUTO: 14.6 % — SIGNIFICANT CHANGE UP (ref 13–44)
MAGNESIUM SERPL-MCNC: 1.8 MG/DL — SIGNIFICANT CHANGE UP (ref 1.6–2.6)
MCHC RBC-ENTMCNC: 30.2 PG — SIGNIFICANT CHANGE UP (ref 27–34)
MCHC RBC-ENTMCNC: 31.4 G/DL — LOW (ref 32–36)
MCV RBC AUTO: 96.2 FL — SIGNIFICANT CHANGE UP (ref 80–100)
MONOCYTES # BLD AUTO: 0.51 K/UL — SIGNIFICANT CHANGE UP (ref 0–0.9)
MONOCYTES NFR BLD AUTO: 7 % — SIGNIFICANT CHANGE UP (ref 2–14)
MRSA PCR RESULT.: SIGNIFICANT CHANGE UP
NEUTROPHILS # BLD AUTO: 4.94 K/UL — SIGNIFICANT CHANGE UP (ref 1.8–7.4)
NEUTROPHILS NFR BLD AUTO: 67.9 % — SIGNIFICANT CHANGE UP (ref 43–77)
NRBC # BLD AUTO: 0 K/UL — SIGNIFICANT CHANGE UP (ref 0–0)
NRBC # FLD: 0 K/UL — SIGNIFICANT CHANGE UP (ref 0–0)
NRBC BLD AUTO-RTO: 0 /100 WBCS — SIGNIFICANT CHANGE UP (ref 0–0)
PHOSPHATE SERPL-MCNC: 3.5 MG/DL — SIGNIFICANT CHANGE UP (ref 2.4–4.7)
PLATELET # BLD AUTO: 118 K/UL — LOW (ref 150–400)
PMV BLD: 9.9 FL — SIGNIFICANT CHANGE UP (ref 7–13)
POTASSIUM SERPL-MCNC: 3.9 MMOL/L — SIGNIFICANT CHANGE UP (ref 3.5–5.3)
POTASSIUM SERPL-SCNC: 3.9 MMOL/L — SIGNIFICANT CHANGE UP (ref 3.5–5.3)
PROT SERPL-MCNC: 6.4 G/DL — LOW (ref 6.6–8.7)
RBC # BLD: 2.91 M/UL — LOW (ref 4.2–5.8)
RBC # BLD: 2.91 M/UL — LOW (ref 4.2–5.8)
RBC # FLD: 14.4 % — SIGNIFICANT CHANGE UP (ref 10.3–14.5)
RETICS #: 41.6 K/UL — SIGNIFICANT CHANGE UP (ref 25–125)
RETICS/RBC NFR: 1.4 % — SIGNIFICANT CHANGE UP (ref 0.5–2.5)
RETICULOCYTE HEMOGLOBIN EQUIVALENT: 36.1 PG — SIGNIFICANT CHANGE UP (ref 36–38.6)
S AUREUS DNA NOSE QL NAA+PROBE: SIGNIFICANT CHANGE UP
SODIUM SERPL-SCNC: 140 MMOL/L — SIGNIFICANT CHANGE UP (ref 135–145)
TIBC SERPL-MCNC: 226 UG/DL — SIGNIFICANT CHANGE UP (ref 220–430)
TRANSFERRIN SERPL-MCNC: 158 MG/DL — LOW (ref 180–329)
TSH SERPL-MCNC: 6.67 UIU/ML — HIGH (ref 0.27–4.2)
VIT B12 SERPL-MCNC: 817 PG/ML — SIGNIFICANT CHANGE UP (ref 232–1245)
WBC # BLD: 7.28 K/UL — SIGNIFICANT CHANGE UP (ref 3.8–10.5)
WBC # FLD AUTO: 7.28 K/UL — SIGNIFICANT CHANGE UP (ref 3.8–10.5)

## 2025-04-29 PROCEDURE — 84443 ASSAY THYROID STIM HORMONE: CPT

## 2025-04-29 PROCEDURE — 82330 ASSAY OF CALCIUM: CPT

## 2025-04-29 PROCEDURE — 84100 ASSAY OF PHOSPHORUS: CPT

## 2025-04-29 PROCEDURE — 85018 HEMOGLOBIN: CPT

## 2025-04-29 PROCEDURE — 85025 COMPLETE CBC W/AUTO DIFF WBC: CPT

## 2025-04-29 PROCEDURE — 80053 COMPREHEN METABOLIC PANEL: CPT

## 2025-04-29 PROCEDURE — 82947 ASSAY GLUCOSE BLOOD QUANT: CPT

## 2025-04-29 PROCEDURE — 83735 ASSAY OF MAGNESIUM: CPT

## 2025-04-29 PROCEDURE — 82607 VITAMIN B-12: CPT

## 2025-04-29 PROCEDURE — 85045 AUTOMATED RETICULOCYTE COUNT: CPT

## 2025-04-29 PROCEDURE — 83605 ASSAY OF LACTIC ACID: CPT

## 2025-04-29 PROCEDURE — 99239 HOSP IP/OBS DSCHRG MGMT >30: CPT

## 2025-04-29 PROCEDURE — 83690 ASSAY OF LIPASE: CPT

## 2025-04-29 PROCEDURE — 82435 ASSAY OF BLOOD CHLORIDE: CPT

## 2025-04-29 PROCEDURE — 99261: CPT

## 2025-04-29 PROCEDURE — 83550 IRON BINDING TEST: CPT

## 2025-04-29 PROCEDURE — 85014 HEMATOCRIT: CPT

## 2025-04-29 PROCEDURE — 82728 ASSAY OF FERRITIN: CPT

## 2025-04-29 PROCEDURE — 84132 ASSAY OF SERUM POTASSIUM: CPT

## 2025-04-29 PROCEDURE — 96374 THER/PROPH/DIAG INJ IV PUSH: CPT

## 2025-04-29 PROCEDURE — 82803 BLOOD GASES ANY COMBINATION: CPT

## 2025-04-29 PROCEDURE — 87640 STAPH A DNA AMP PROBE: CPT

## 2025-04-29 PROCEDURE — 85610 PROTHROMBIN TIME: CPT

## 2025-04-29 PROCEDURE — 87641 MR-STAPH DNA AMP PROBE: CPT

## 2025-04-29 PROCEDURE — 36415 COLL VENOUS BLD VENIPUNCTURE: CPT

## 2025-04-29 PROCEDURE — 84466 ASSAY OF TRANSFERRIN: CPT

## 2025-04-29 PROCEDURE — 74177 CT ABD & PELVIS W/CONTRAST: CPT | Mod: MC

## 2025-04-29 PROCEDURE — 85730 THROMBOPLASTIN TIME PARTIAL: CPT

## 2025-04-29 PROCEDURE — 83540 ASSAY OF IRON: CPT

## 2025-04-29 PROCEDURE — 99285 EMERGENCY DEPT VISIT HI MDM: CPT

## 2025-04-29 PROCEDURE — 84295 ASSAY OF SERUM SODIUM: CPT

## 2025-04-29 PROCEDURE — 96375 TX/PRO/DX INJ NEW DRUG ADDON: CPT

## 2025-04-29 RX ADMIN — Medication 0.5 MILLIGRAM(S): at 00:17

## 2025-04-29 RX ADMIN — Medication 0.3 MILLIGRAM(S): at 05:43

## 2025-04-29 RX ADMIN — Medication 1 APPLICATION(S): at 08:33

## 2025-04-29 RX ADMIN — CARVEDILOL 6.25 MILLIGRAM(S): 3.12 TABLET, FILM COATED ORAL at 05:43

## 2025-04-29 RX ADMIN — AMLODIPINE BESYLATE 10 MILLIGRAM(S): 10 TABLET ORAL at 05:43

## 2025-04-29 RX ADMIN — Medication 40 MILLIGRAM(S): at 05:44

## 2025-04-29 RX ADMIN — SEVELAMER HYDROCHLORIDE 1600 MILLIGRAM(S): 800 TABLET ORAL at 08:33

## 2025-04-29 RX ADMIN — Medication 20 MILLIGRAM(S): at 05:45

## 2025-04-29 RX ADMIN — ISOSORBIDE MONONITRATE 60 MILLIGRAM(S): 60 TABLET, EXTENDED RELEASE ORAL at 08:33

## 2025-04-29 RX ADMIN — Medication 100 MILLIGRAM(S): at 05:44

## 2025-04-29 RX ADMIN — Medication 81 MILLIGRAM(S): at 08:33

## 2025-04-29 RX ADMIN — LEVETIRACETAM 500 MILLIGRAM(S): 10 INJECTION, SOLUTION INTRAVENOUS at 05:44

## 2025-04-29 NOTE — DISCHARGE NOTE PROVIDER - NSDCFUSCHEDAPPT_GEN_ALL_CORE_FT
Kriss Bach  [facility]  ELECTROPH [address]  Scheduled Appointment: 05/02/2025    Prasanna Crawford  [facility]  VASCULAR [address]  Scheduled Appointment: 05/06/2025

## 2025-04-29 NOTE — DISCHARGE NOTE NURSING/CASE MANAGEMENT/SOCIAL WORK - FINANCIAL ASSISTANCE
[facility] provides services at a reduced cost to those who are determined to be eligible through [facility]’s financial assistance program. Information regarding [facility]’s financial assistance program can be found by going to [email or web address] or by calling [phone].

## 2025-04-29 NOTE — DISCHARGE NOTE PROVIDER - NSDCMRMEDTOKEN_GEN_ALL_CORE_FT
acetaminophen 325 mg oral tablet: 2 tab(s) orally every 6 hours As needed Temp greater or equal to 38C (100.4F), Mild Pain (1 - 3)  amLODIPine 10 mg oral tablet: 1 tab(s) orally once a day  aspirin 81 mg oral tablet: orally once a day followup with your cardiology about timing of resumption of Aspirin  atorvastatin 40 mg oral tablet: 1 tab(s) orally once a day (at bedtime)  carvedilol 6.25 mg oral tablet: 1 tab(s) orally 2 times a day  cloNIDine 0.3 mg oral tablet: 1 tab(s) orally 2 times a day  hydrALAZINE 100 mg oral tablet: 1 tab(s) orally 2 times a day  isosorbide mononitrate 60 mg oral tablet, extended release: 1 tab(s) orally once a day  levETIRAcetam 500 mg oral tablet: 1 tab(s) orally 2 times a day  Lomotil 2.5 mg-0.025 mg oral tablet: 1 tab(s) orally once a day  ondansetron 4 mg oral tablet: 1 tab(s) orally 4 times a day as needed for  nausea  pantoprazole 40 mg oral delayed release tablet: 1 tab(s) orally once a day (before a meal)  sevelamer carbonate 800 mg oral tablet: 2 tab(s) orally 3 times a day (with meals)  tamsulosin 0.4 mg oral capsule: 1 cap(s) orally once a day (at bedtime)  torsemide 20 mg oral tablet: 1 tab(s) orally once a day

## 2025-04-29 NOTE — DISCHARGE NOTE NURSING/CASE MANAGEMENT/SOCIAL WORK - PATIENT PORTAL LINK FT
You can access the FollowMyHealth Patient Portal offered by [facility] by registering at the following website: [email or web address]. By joining [facility]’s FollowMyHealth portal, you will also be able to view your health information using other applications (apps) compatible with our system.

## 2025-04-29 NOTE — PROGRESS NOTE ADULT - SUBJECTIVE AND OBJECTIVE BOX
NEPHROLOGY INTERVAL HPI/OVERNIGHT EVENTS:    Feels well today, has  chronic  diarrhea.    MEDICATIONS  (STANDING):  amLODIPine   Tablet 10 milliGRAM(s) Oral daily  aspirin  chewable 81 milliGRAM(s) Oral daily  atorvastatin 40 milliGRAM(s) Oral at bedtime  carvedilol 6.25 milliGRAM(s) Oral every 12 hours  cloNIDine 0.3 milliGRAM(s) Oral two times a day  hydrALAZINE 100 milliGRAM(s) Oral two times a day  influenza   Vaccine 0.5 milliLiter(s) IntraMuscular once  isosorbide   mononitrate ER Tablet (IMDUR) 60 milliGRAM(s) Oral daily  levETIRAcetam 500 milliGRAM(s) Oral two times a day  pantoprazole    Tablet 40 milliGRAM(s) Oral before breakfast  sevelamer carbonate 1600 milliGRAM(s) Oral three times a day with meals  tamsulosin 0.4 milliGRAM(s) Oral at bedtime  torsemide 20 milliGRAM(s) Oral daily    MEDICATIONS  (PRN):  acetaminophen     Tablet .. 650 milliGRAM(s) Oral every 6 hours PRN Temp greater or equal to 38C (100.4F), Mild Pain (1 - 3)  hydrALAZINE Injectable 10 milliGRAM(s) IV Push every 6 hours PRN for sbp above 160 mmhg  ondansetron Injectable 4 milliGRAM(s) IV Push every 6 hours PRN Nausea and/or Vomiting  oxyCODONE    IR 5 milliGRAM(s) Oral every 6 hours PRN Moderate Pain (4 - 6)  oxyCODONE    IR 10 milliGRAM(s) Oral every 6 hours PRN Severe Pain (7 - 10)      Allergies    penicillin (Other)          Vital Signs Last 24 Hrs  T(C): 36.7 (2025 07:38), Max: 37.1 (2025 10:17)  T(F): 98 (2025 07:38), Max: 98.7 (2025 10:17)  HR: 68 (2025 07:38) (68 - 95)  BP: 123/63 (2025 07:38) (119/68 - 205/98)  BP(mean): 98 (2025 19:47) (98 - 98)  RR: 18 (2025 07:38) (17 - 18)  SpO2: 96% (2025 07:38) (92% - 97%)    Parameters below as of 2025 07:38  Patient On (Oxygen Delivery Method): room air      Daily Height in cm: 167.64 (2025 10:17)    Daily Weight in k.9 (2025 22:08)    PHYSICAL EXAM:    GENERAL: comfortable  supine in bed.  HEAD:  wnl  EYES: open  ENMT:   NECK: veins flat  NERVOUS SYSTEM: alert, oriented   CHEST/LUNG: no 02, clear  HEART: no  rub noted  ABDOMEN: soft  EXTREMITIES:  ecchymosis right upper arm  LYMPH:   SKIN: pale    LABS:                        9.6    8.48  )-----------( 136      ( 2025 11:41 )             30.4         135  |  97  |  41.5[H]  ----------------------------<  94  5.0   |  21.0[L]  |  5.39[H]    Ca    9.1      2025 11:41    TPro  7.4  /  Alb  4.2  /  TBili  2.1[H]  /  DBili  x   /  AST  34  /  ALT  15  /  AlkPhos  91  04-28    PT/INR - ( 2025 11:41 )   PT: 15.4 sec;   INR: 1.37 ratio         PTT - ( 2025 11:41 )  PTT:34.0 sec  Urinalysis Basic - ( 2025 11:41 )    Color: x / Appearance: x / SG: x / pH: x  Gluc: 94 mg/dL / Ketone: x  / Bili: x / Urobili: x   Blood: x / Protein: x / Nitrite: x   Leuk Esterase: x / RBC: x / WBC x   Sq Epi: x / Non Sq Epi: x / Bacteria: x              RADIOLOGY & ADDITIONAL TESTS:

## 2025-04-29 NOTE — DISCHARGE NOTE PROVIDER - ATTENDING DISCHARGE PHYSICAL EXAMINATION:
[id number] nad   rrr s1s2  ctab  soft abdomen  no LE edema  nonfocal neuro  ROS negative. has chronic diarrhea.

## 2025-04-29 NOTE — DISCHARGE NOTE PROVIDER - NSDCCPCAREPLAN_GEN_ALL_CORE_FT
PRINCIPAL DISCHARGE DIAGNOSIS  Diagnosis: Recurrent abdominal pain  Assessment and Plan of Treatment: unclear etiology  follow up with GI and your primary care doctor      SECONDARY DISCHARGE DIAGNOSES  Diagnosis: ESRD needing dialysis  Assessment and Plan of Treatment: continue home dialysis

## 2025-04-29 NOTE — DISCHARGE NOTE PROVIDER - CARE PROVIDER_API CALL
Austin Nieto  Gastroenterology  [address] [facility], [facility] [address]  [place] [address]  Phone: [phone]  Fax: [phone]  Follow Up Time:

## 2025-04-29 NOTE — DISCHARGE NOTE NURSING/CASE MANAGEMENT/SOCIAL WORK - NSDCVIVACCINE_GEN_ALL_CORE_FT
influenza, injectable, quadrivalent, preservative free; 12-Dec-2020 09:28; Lynn Gerardo); [employer]; [id number] (Exp. Date: 30-Jun-2021); IntraMuscular; Deltoid Right.; 0.5 milliLiter(s); VIS (VIS Published: 15-Aug-2019, VIS Presented: 12-Dec-2020);

## 2025-04-29 NOTE — DISCHARGE NOTE PROVIDER - HOSPITAL COURSE
64 year old male with PMHx of Leadless Pacemaker via RFV access on 4/15/2025,  ESRD (HD T/Th/S right chest wall cath, LUE graft), Afib s/p ablation, LAAO (watchman), CAD s/p CABG x2, bioprosthetic AVR/MR repair, MV endocarditis 2023, type A aortic dissection s/p repair w/ ischemic bowel s/p resection, seizure disorder, hx substance abuse,  malfunction LUE graft presents from home with worsening abd pain associated with nausea which patient states has been ongoing for 3 months. Patient denies trauma to the area, recent travel or sick contacts.     Patient is planned for CT abd with iv contrast and HD post contrast.   ct without bowel obstruction   patient at baseline and would like to go home.

## 2025-04-29 NOTE — PROGRESS NOTE ADULT - ASSESSMENT
A) CRF  stable, s/p prior colon surgery with chronic  diarrhea.    P) Cleared  from  renal point of  view for discharge.

## 2025-05-02 ENCOUNTER — APPOINTMENT (OUTPATIENT)
Dept: ELECTROPHYSIOLOGY | Facility: CLINIC | Age: 65
End: 2025-05-02

## 2025-05-15 PROCEDURE — 93244 EXT ECG>48HR<7D REV&INTERPJ: CPT

## 2025-05-19 ENCOUNTER — INPATIENT (INPATIENT)
Facility: HOSPITAL | Age: 65
LOS: 14 days | Discharge: HOME | DRG: 286 | End: 2025-06-03
Attending: STUDENT IN AN ORGANIZED HEALTH CARE EDUCATION/TRAINING PROGRAM | Admitting: STUDENT IN AN ORGANIZED HEALTH CARE EDUCATION/TRAINING PROGRAM
Payer: MEDICARE

## 2025-05-19 ENCOUNTER — NON-APPOINTMENT (OUTPATIENT)
Age: 65
End: 2025-05-19

## 2025-05-19 ENCOUNTER — APPOINTMENT (OUTPATIENT)
Dept: CARDIOLOGY | Facility: CLINIC | Age: 65
End: 2025-05-19
Payer: MEDICARE

## 2025-05-19 ENCOUNTER — RESULT REVIEW (OUTPATIENT)
Age: 65
End: 2025-05-19

## 2025-05-19 VITALS
TEMPERATURE: 98 F | DIASTOLIC BLOOD PRESSURE: 106 MMHG | RESPIRATION RATE: 22 BRPM | OXYGEN SATURATION: 95 % | WEIGHT: 139.99 LBS | HEIGHT: 66 IN | HEART RATE: 98 BPM | SYSTOLIC BLOOD PRESSURE: 203 MMHG

## 2025-05-19 VITALS
OXYGEN SATURATION: 95 % | SYSTOLIC BLOOD PRESSURE: 200 MMHG | DIASTOLIC BLOOD PRESSURE: 98 MMHG | BODY MASS INDEX: 20.92 KG/M2 | HEART RATE: 97 BPM | WEIGHT: 138 LBS | HEIGHT: 68 IN

## 2025-05-19 DIAGNOSIS — I25.10 ATHEROSCLEROTIC HEART DISEASE OF NATIVE CORONARY ARTERY W/OUT ANGINA PECTORIS: ICD-10-CM

## 2025-05-19 DIAGNOSIS — Z95.2 PRESENCE OF PROSTHETIC HEART VALVE: Chronic | ICD-10-CM

## 2025-05-19 DIAGNOSIS — I10 ESSENTIAL (PRIMARY) HYPERTENSION: ICD-10-CM

## 2025-05-19 DIAGNOSIS — J96.01 ACUTE RESPIRATORY FAILURE WITH HYPOXIA: ICD-10-CM

## 2025-05-19 DIAGNOSIS — Z94.0 KIDNEY TRANSPLANT STATUS: Chronic | ICD-10-CM

## 2025-05-19 DIAGNOSIS — I48.20 CHRONIC ATRIAL FIBRILLATION, UNSPECIFIED: ICD-10-CM

## 2025-05-19 DIAGNOSIS — Z86.79 PERSONAL HISTORY OF OTHER DISEASES OF THE CIRCULATORY SYSTEM: Chronic | ICD-10-CM

## 2025-05-19 DIAGNOSIS — I77.0 ARTERIOVENOUS FISTULA, ACQUIRED: Chronic | ICD-10-CM

## 2025-05-19 DIAGNOSIS — E78.5 HYPERLIPIDEMIA, UNSPECIFIED: ICD-10-CM

## 2025-05-19 DIAGNOSIS — Z95.1 PRESENCE OF AORTOCORONARY BYPASS GRAFT: Chronic | ICD-10-CM

## 2025-05-19 DIAGNOSIS — Z95.818 PRESENCE OF OTHER CARDIAC IMPLANTS AND GRAFTS: Chronic | ICD-10-CM

## 2025-05-19 DIAGNOSIS — N18.6 END STAGE RENAL DISEASE: ICD-10-CM

## 2025-05-19 DIAGNOSIS — I50.32 CHRONIC DIASTOLIC (CONGESTIVE) HEART FAILURE: ICD-10-CM

## 2025-05-19 DIAGNOSIS — I25.10 ATHEROSCLEROTIC HEART DISEASE OF NATIVE CORONARY ARTERY WITHOUT ANGINA PECTORIS: ICD-10-CM

## 2025-05-19 DIAGNOSIS — Z90.49 ACQUIRED ABSENCE OF OTHER SPECIFIED PARTS OF DIGESTIVE TRACT: Chronic | ICD-10-CM

## 2025-05-19 DIAGNOSIS — I48.91 UNSPECIFIED ATRIAL FIBRILLATION: ICD-10-CM

## 2025-05-19 LAB
ALBUMIN SERPL ELPH-MCNC: 4.4 G/DL — SIGNIFICANT CHANGE UP (ref 3.3–5.2)
ALP SERPL-CCNC: 95 U/L — SIGNIFICANT CHANGE UP (ref 40–120)
ALT FLD-CCNC: 17 U/L — SIGNIFICANT CHANGE UP
ANION GAP SERPL CALC-SCNC: 22 MMOL/L — HIGH (ref 5–17)
APTT BLD: 34 SEC — SIGNIFICANT CHANGE UP (ref 26.1–36.8)
AST SERPL-CCNC: 21 U/L — SIGNIFICANT CHANGE UP
BASOPHILS # BLD AUTO: 0.04 K/UL — SIGNIFICANT CHANGE UP (ref 0–0.2)
BASOPHILS NFR BLD AUTO: 0.5 % — SIGNIFICANT CHANGE UP (ref 0–2)
BILIRUB SERPL-MCNC: 1.3 MG/DL — SIGNIFICANT CHANGE UP (ref 0.4–2)
BLD GP AB SCN SERPL QL: SIGNIFICANT CHANGE UP
BUN SERPL-MCNC: 86.1 MG/DL — HIGH (ref 8–20)
CALCIUM SERPL-MCNC: 8.2 MG/DL — LOW (ref 8.4–10.5)
CHLORIDE SERPL-SCNC: 96 MMOL/L — SIGNIFICANT CHANGE UP (ref 96–108)
CO2 SERPL-SCNC: 20 MMOL/L — LOW (ref 22–29)
CREAT SERPL-MCNC: 5.73 MG/DL — HIGH (ref 0.5–1.3)
DIR ANTIGLOB POLYSPECIFIC INTERPRETATION: SIGNIFICANT CHANGE UP
EGFR: 10 ML/MIN/1.73M2 — LOW
EGFR: 10 ML/MIN/1.73M2 — LOW
EOSINOPHIL # BLD AUTO: 0.23 K/UL — SIGNIFICANT CHANGE UP (ref 0–0.5)
EOSINOPHIL NFR BLD AUTO: 3.2 % — SIGNIFICANT CHANGE UP (ref 0–6)
GAS PNL BLDV: SIGNIFICANT CHANGE UP
GLUCOSE SERPL-MCNC: 88 MG/DL — SIGNIFICANT CHANGE UP (ref 70–99)
HCT VFR BLD CALC: 26.2 % — LOW (ref 39–50)
HGB BLD-MCNC: 8.4 G/DL — LOW (ref 13–17)
IMM GRANULOCYTES # BLD AUTO: 0.02 K/UL — SIGNIFICANT CHANGE UP (ref 0–0.07)
IMM GRANULOCYTES NFR BLD AUTO: 0.3 % — SIGNIFICANT CHANGE UP (ref 0–0.9)
INR BLD: 1.31 RATIO — HIGH (ref 0.85–1.16)
LYMPHOCYTES # BLD AUTO: 0.75 K/UL — LOW (ref 1–3.3)
LYMPHOCYTES NFR BLD AUTO: 10.3 % — LOW (ref 13–44)
MCHC RBC-ENTMCNC: 31.7 PG — SIGNIFICANT CHANGE UP (ref 27–34)
MCHC RBC-ENTMCNC: 32.1 G/DL — SIGNIFICANT CHANGE UP (ref 32–36)
MCV RBC AUTO: 98.9 FL — SIGNIFICANT CHANGE UP (ref 80–100)
MONOCYTES # BLD AUTO: 0.5 K/UL — SIGNIFICANT CHANGE UP (ref 0–0.9)
MONOCYTES NFR BLD AUTO: 6.9 % — SIGNIFICANT CHANGE UP (ref 2–14)
NEUTROPHILS # BLD AUTO: 5.75 K/UL — SIGNIFICANT CHANGE UP (ref 1.8–7.4)
NEUTROPHILS NFR BLD AUTO: 78.8 % — HIGH (ref 43–77)
NRBC # BLD AUTO: 0 K/UL — SIGNIFICANT CHANGE UP (ref 0–0)
NRBC # FLD: 0 K/UL — SIGNIFICANT CHANGE UP (ref 0–0)
NRBC BLD AUTO-RTO: 0 /100 WBCS — SIGNIFICANT CHANGE UP (ref 0–0)
NT-PROBNP SERPL-SCNC: HIGH PG/ML (ref 0–300)
PLATELET # BLD AUTO: 126 K/UL — LOW (ref 150–400)
PMV BLD: 11.7 FL — SIGNIFICANT CHANGE UP (ref 7–13)
POTASSIUM SERPL-MCNC: 3.6 MMOL/L — SIGNIFICANT CHANGE UP (ref 3.5–5.3)
POTASSIUM SERPL-SCNC: 3.6 MMOL/L — SIGNIFICANT CHANGE UP (ref 3.5–5.3)
PROT SERPL-MCNC: 7.1 G/DL — SIGNIFICANT CHANGE UP (ref 6.6–8.7)
PROTHROM AB SERPL-ACNC: 15.2 SEC — HIGH (ref 9.9–13.4)
RBC # BLD: 2.65 M/UL — LOW (ref 4.2–5.8)
RBC # FLD: 15.9 % — HIGH (ref 10.3–14.5)
SODIUM SERPL-SCNC: 138 MMOL/L — SIGNIFICANT CHANGE UP (ref 135–145)
TROPONIN T, HIGH SENSITIVITY RESULT: 113 NG/L — HIGH (ref 0–51)
TROPONIN T, HIGH SENSITIVITY RESULT: 117 NG/L — HIGH (ref 0–51)
WBC # BLD: 7.29 K/UL — SIGNIFICANT CHANGE UP (ref 3.8–10.5)
WBC # FLD AUTO: 7.29 K/UL — SIGNIFICANT CHANGE UP (ref 3.8–10.5)

## 2025-05-19 PROCEDURE — 99285 EMERGENCY DEPT VISIT HI MDM: CPT

## 2025-05-19 PROCEDURE — 36410 VNPNXR 3YR/> PHY/QHP DX/THER: CPT

## 2025-05-19 PROCEDURE — 99223 1ST HOSP IP/OBS HIGH 75: CPT

## 2025-05-19 PROCEDURE — 99214 OFFICE O/P EST MOD 30 MIN: CPT

## 2025-05-19 PROCEDURE — 93010 ELECTROCARDIOGRAM REPORT: CPT | Mod: 76

## 2025-05-19 PROCEDURE — 93321 DOPPLER ECHO F-UP/LMTD STD: CPT | Mod: 26

## 2025-05-19 PROCEDURE — 93000 ELECTROCARDIOGRAM COMPLETE: CPT

## 2025-05-19 PROCEDURE — 93308 TTE F-UP OR LMTD: CPT | Mod: 26

## 2025-05-19 PROCEDURE — 71250 CT THORAX DX C-: CPT | Mod: 26

## 2025-05-19 PROCEDURE — 71046 X-RAY EXAM CHEST 2 VIEWS: CPT | Mod: 26

## 2025-05-19 RX ORDER — FUROSEMIDE 10 MG/ML
40 INJECTION INTRAMUSCULAR; INTRAVENOUS ONCE
Refills: 0 | Status: COMPLETED | OUTPATIENT
Start: 2025-05-19 | End: 2025-05-19

## 2025-05-19 RX ORDER — METRONIDAZOLE 500 MG/1
500 TABLET ORAL EVERY 8 HOURS
Refills: 0 | Status: ACTIVE | COMMUNITY
Start: 2025-05-19

## 2025-05-19 RX ORDER — MELATONIN 5 MG
3 TABLET ORAL AT BEDTIME
Refills: 0 | Status: DISCONTINUED | OUTPATIENT
Start: 2025-05-19 | End: 2025-05-23

## 2025-05-19 RX ORDER — ASPIRIN 325 MG
81 TABLET ORAL ONCE
Refills: 0 | Status: COMPLETED | OUTPATIENT
Start: 2025-05-19 | End: 2025-05-19

## 2025-05-19 RX ORDER — AMLODIPINE BESYLATE 10 MG/1
10 TABLET ORAL DAILY
Refills: 0 | Status: DISCONTINUED | OUTPATIENT
Start: 2025-05-19 | End: 2025-05-20

## 2025-05-19 RX ORDER — HEPARIN SODIUM 1000 [USP'U]/ML
5000 INJECTION INTRAVENOUS; SUBCUTANEOUS EVERY 8 HOURS
Refills: 0 | Status: DISCONTINUED | OUTPATIENT
Start: 2025-05-19 | End: 2025-05-23

## 2025-05-19 RX ORDER — ATORVASTATIN CALCIUM 80 MG/1
40 TABLET, FILM COATED ORAL AT BEDTIME
Refills: 0 | Status: DISCONTINUED | OUTPATIENT
Start: 2025-05-19 | End: 2025-05-23

## 2025-05-19 RX ORDER — IRON SUCROSE 20 MG/ML
100 INJECTION, SOLUTION INTRAVENOUS ONCE
Refills: 0 | Status: COMPLETED | OUTPATIENT
Start: 2025-05-19 | End: 2025-05-19

## 2025-05-19 RX ORDER — ONDANSETRON HCL/PF 4 MG/2 ML
4 VIAL (ML) INJECTION ONCE
Refills: 0 | Status: COMPLETED | OUTPATIENT
Start: 2025-05-19 | End: 2025-05-19

## 2025-05-19 RX ORDER — CARVEDILOL 3.12 MG/1
6.25 TABLET, FILM COATED ORAL EVERY 12 HOURS
Refills: 0 | Status: DISCONTINUED | OUTPATIENT
Start: 2025-05-19 | End: 2025-05-22

## 2025-05-19 RX ORDER — DIPHENOXYLATE HYDROCHLORIDE AND ATROPINE SULFATE 2.5; .025 MG/1; MG/1
2.5-0.025 TABLET ORAL
Refills: 0 | Status: ACTIVE | COMMUNITY
Start: 2025-05-19

## 2025-05-19 RX ORDER — TAMSULOSIN HYDROCHLORIDE 0.4 MG/1
0.4 CAPSULE ORAL AT BEDTIME
Refills: 0 | Status: DISCONTINUED | OUTPATIENT
Start: 2025-05-19 | End: 2025-05-23

## 2025-05-19 RX ORDER — EPOETIN ALFA 10000 [IU]/ML
10000 SOLUTION INTRAVENOUS; SUBCUTANEOUS ONCE
Refills: 0 | Status: COMPLETED | OUTPATIENT
Start: 2025-05-19 | End: 2025-05-19

## 2025-05-19 RX ORDER — TORSEMIDE 10 MG
20 TABLET ORAL DAILY
Refills: 0 | Status: DISCONTINUED | OUTPATIENT
Start: 2025-05-19 | End: 2025-05-23

## 2025-05-19 RX ORDER — LEVETIRACETAM 10 MG/ML
500 INJECTION, SOLUTION INTRAVENOUS
Refills: 0 | Status: DISCONTINUED | OUTPATIENT
Start: 2025-05-19 | End: 2025-05-23

## 2025-05-19 RX ORDER — EPOETIN ALFA 10000 [IU]/ML
10000 SOLUTION INTRAVENOUS; SUBCUTANEOUS ONCE
Refills: 0 | Status: DISCONTINUED | OUTPATIENT
Start: 2025-05-19 | End: 2025-05-19

## 2025-05-19 RX ORDER — ACETAMINOPHEN 500 MG/5ML
650 LIQUID (ML) ORAL EVERY 6 HOURS
Refills: 0 | Status: DISCONTINUED | OUTPATIENT
Start: 2025-05-19 | End: 2025-05-23

## 2025-05-19 RX ORDER — ASPIRIN 325 MG
81 TABLET ORAL DAILY
Refills: 0 | Status: DISCONTINUED | OUTPATIENT
Start: 2025-05-19 | End: 2025-05-23

## 2025-05-19 RX ORDER — ONDANSETRON HCL/PF 4 MG/2 ML
4 VIAL (ML) INJECTION EVERY 8 HOURS
Refills: 0 | Status: DISCONTINUED | OUTPATIENT
Start: 2025-05-19 | End: 2025-05-23

## 2025-05-19 RX ORDER — ISOSORBIDE MONONITRATE 60 MG/1
60 TABLET, EXTENDED RELEASE ORAL DAILY
Refills: 0 | Status: DISCONTINUED | OUTPATIENT
Start: 2025-05-19 | End: 2025-05-23

## 2025-05-19 RX ADMIN — EPOETIN ALFA 10000 UNIT(S): 10000 SOLUTION INTRAVENOUS; SUBCUTANEOUS at 20:12

## 2025-05-19 RX ADMIN — CARVEDILOL 6.25 MILLIGRAM(S): 3.12 TABLET, FILM COATED ORAL at 21:49

## 2025-05-19 RX ADMIN — TAMSULOSIN HYDROCHLORIDE 0.4 MILLIGRAM(S): 0.4 CAPSULE ORAL at 21:49

## 2025-05-19 RX ADMIN — ATORVASTATIN CALCIUM 40 MILLIGRAM(S): 80 TABLET, FILM COATED ORAL at 21:49

## 2025-05-19 RX ADMIN — Medication 4 MILLIGRAM(S): at 12:18

## 2025-05-19 RX ADMIN — Medication 0.3 MILLIGRAM(S): at 21:49

## 2025-05-19 RX ADMIN — LEVETIRACETAM 500 MILLIGRAM(S): 10 INJECTION, SOLUTION INTRAVENOUS at 21:50

## 2025-05-19 RX ADMIN — Medication 100 MILLIGRAM(S): at 21:48

## 2025-05-19 RX ADMIN — FUROSEMIDE 40 MILLIGRAM(S): 10 INJECTION INTRAMUSCULAR; INTRAVENOUS at 15:46

## 2025-05-19 RX ADMIN — HEPARIN SODIUM 5000 UNIT(S): 1000 INJECTION INTRAVENOUS; SUBCUTANEOUS at 21:49

## 2025-05-19 RX ADMIN — Medication 81 MILLIGRAM(S): at 15:17

## 2025-05-19 RX ADMIN — IRON SUCROSE 100 MILLIGRAM(S): 20 INJECTION, SOLUTION INTRAVENOUS at 20:26

## 2025-05-19 NOTE — CONSULT NOTE ADULT - SUBJECTIVE AND OBJECTIVE BOX
[facility]                                              CARDIOLOGY AT [address], [place] [address]                                             Telephone: [phone]. Fax:[phone]                                                       CARDIOLOGY CONSULTATION NOTE                                                                                             History obtained by: Patient and medical record  Community Cardiologist: vinnie   obtained: Yes [  ] No [  ]  Reason for Consultation: dyspnea  Available out pt records reviewed: Yes [  ] No [  ]    Chief complaint:    Patient is a 64y old  Male who presents with a chief complaint of     HPI: 63 y/o male ESRD HD T,Th,S, atrial fibrillation s/p ablation (PVI, CTI, Mitral line, 2/2021), LAAO with Watchman device 8/2024, type A aortic dissection s/p repair 11/2020 c/b ischemic bowel s/p resection, CAD s/p re-op sternotomy CABG x2 and bioprosthetic  AVR/ MV repair, former substance use disorder, MV endocarditis 2023, recent hospitalization 3/18 - 3/21 due to malfunctioning LUE fistula now s/p R sided IR permacath placement, bradycardia now s/p micra PPM 4/25 who now presents to [facility] with c/o dyspnea on exertion. Seen by out patient cardiology team today, sent to hospital for work up elevated BP, dyspnea. Pt reports he was in usual state of health, went to car show yesterday and was feeling sob while walking which is new for him. At this time pt endorsing some dyspnea, and nausea. Pt states he missed dialysis last week, but had two sessions back to back to make up for it (can not recall days). Admits to dietary indiscretions over the weekend. Admits to 5 lbs weight gain. Did not take medications secondary to nausea this morning.       CARDIAC TESTING   ECHO:  < from: TTE W or WO Ultrasound Enhancing Agent (04.14.25 @ 11:44) >  CONCLUSIONS:      1. Septal motion is abnormal consistent with previous cardiac surgery. Left ventricular systolic function is normal with an ejection fraction visually estimated at 65 to 70 %.   2. Elevated left ventricular filling pressure.   3. Normal right ventricular cavity size and normal right ventricular systolic function.   4. A bioprosthetic valve replacement valve replacement is present in the aortic position The prosthetic valve is well seated with normal function. No prosthetic aortic stenosis. Trace intravalvular regurgitation.   5. Aortic root at the sinuses of Valsalva is dilated, measuring 4.32 cm (indexed 2.26 cm/m²).   6. Bioprosthetic valve present. in the mitral position. Well seated prosthetic mitral valve with normal function. There is trace intravalvular mitral regurgitation.   7. Mild to moderate tricuspid regurgitation.   8. Estimated pulmonary artery systolic pressure is 83 mmHg, consistent with elevatedpulmonary artery pressure.   9. Compared to the transthoracic echocardiogram performed on 12/4/2024, there have been no significant interval changes.    < end of copied text >    CATH:   < from: Cardiac Cath Lab - Adult (11.10.20 @ 07:43) >  INTERVENTIONAL IMPRESSIONS: - Patent ascending aortic graft  - Severe 2 vessel CAD with chronically occluded proximal RCA. There are   brisk collaterals from the LCA.  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 Butler later this week  - [facility] will continue to follow  Prepared and signed by  Celso Stockton MD  Signed 11/11/2020 09:33:32    < end of copied text >      PAST MEDICAL HISTORY  CVA (cerebral vascular accident)  Chronic kidney disease  Seizures  Chronic atrial fibrillation  HTN (hypertension)  Hyperlipemia  COVID-19  ESRD on dialysis  Former smoker  History of cocaine use  H/O aortic dissection  H/O aortic valve insufficiency  GIB (gastrointestinal bleeding)  CAD (coronary artery disease)  CHF (congestive heart failure)  Atrial fibrillation  Mitral regurgitation  Aneurysm of artery of upper extremity  Anemia of chronic disease  End stage renal disease  Myocardial infarction  COVID-19 vaccine series completed  Port-A-Cath in place        PAST SURGICAL HISTORY  Aorta disorder  H/O colectomy  Status post double vessel coronary artery bypass  S/P AVR (aortic valve replacement)  S/P MVR (mitral valve replacement)  H/O aortic dissection  AV fistula  History of renal transplant  Presence of Watchman left atrial appendage closure device      SOCIAL HISTORY:  Denies smoking/alcohol/drugs      FAMILY HISTORY:  FH: hypertension      Family history of cardiac disorder (Mother)  mother      HOME MEDICATIONS:  ? amLODIPine Besylate 10 MG Oral Tablet; TAKE 1 TABLET DAILY  ?? Aspirin 81 81 MG Oral Tablet Delayed Release; TAKE 1 TABLET BY MOUTH ONCE DAILY  ?? Atorvastatin Calcium 40 MG Oral Tablet; TAKE 1 TABLET AT BEDTIME  ?? Carvedilol 6.25 MG Oral Tablet; TAKE 1 TABLET BY MOUTH TWICE A DAY  ?? Diphenoxylate-Atropine 2.5-0.025 MG Oral Tablet; TAKE 1 TO 2 TABLETS EVERY 4 TO 6HOURS AS NEEDED  ?? hydrALAZINE HCl - 100 MG Oral Tablet; TAKE 1 TABLET TWICE DAILY AS DIRECTED  ?? Isosorbide Mononitrate ER 60 MG Oral Tablet Extended Release 24 Hour; TAKE 1TABLET DAILY AS DIRECTED  ?? levETIRAcetam 500 MG Oral Tablet; TAKE 1 TABLET TWICE DAILY  ?? metroNIDAZOLE 500 MG Oral Tablet; TAKE 1 TABLET Every 8 hours  ?? Pantoprazole Sodium 40 MG Oral Tablet Delayed Release; TAKE 1 TABLET DAILY  ?? Riddhi-Russel Oral Tablet; TAKE AS DIRECTED  ?? Tamsulosin HCl - 0.4 MG Oral Capsule; TAKE 1 CAPSULE Daily  ?? Torsemide 20 MG Oral Tablet; TAKE 1 TABLET as needed for leg edema  ?? Vitamin D3 1.25 MG (74524 UT) Oral Capsule; 1 TAB EVERY WEEK    CURRENT CARDIAC MEDICATIONS:      CURRENT OTHER MEDICATIONS:      ALLERGIES:   penicillin (Other)        REVIEW OF SYMPTOMS:   CONSTITUTIONAL: No fever, no chills, no weight loss, no weight gain, no fatigue   ENMT:  No vertigo; No sinus or throat pain  NECK: No pain or stiffness  CARDIOVASCULAR: No chest pain, no dyspnea, no syncope/presyncope, no palpitations, no dizziness, no Orthopnea  RESPIRATORY: no Shortness of breath, no cough, no wheezing  : No dysuria, no hematuria   GI: No dark color stool, no nausea, no diarrhea, no constipation, no abdominal pain   NEURO: No headache, no slurred speech   MUSCULOSKELETAL: No joint pain or swelling; No muscle, back, or extremity pain  PSYCH: No agitation, no anxiety.    ALL OTHER REVIEW OF SYSTEMS ARE NEGATIVE.        VITAL SIGNS:  T(C): 36.4 (05-19-25 @ 10:53), Max: 36.4 (05-19-25 @ 10:53)  T(F): 97.5 (05-19-25 @ 10:53), Max: 97.5 (05-19-25 @ 10:53)  HR: 89 (05-19-25 @ 12:25) (89 - 98)  BP: 174/108 (05-19-25 @ 12:25) (174/108 - 203/106)  RR: 20 (05-19-25 @ 12:25) (20 - 22)  SpO2: 94% (05-19-25 @ 12:25) (86% - 95%)         PHYSICAL EXAM:  Constitutional: Comfortable . No acute distress.   HEENT: Atraumatic and normocephalic , neck is supple . no JVD.   CNS: A&Ox3. No focal deficits.   Respiratory: crackles at bases, dyspnea while speaking, supplemental 02  Cardiovascular: RRR normal s1 s2. No murmur.   Gastrointestinal: Soft, non-tender. +Bowel sounds.   Extremities: +  edema  Psychiatric: Calm . no agitation.   Skin: Warm and dry, no ulcers on extremities       LABS:                        8.4    7.29  )-----------( 126      ( 19 May 2025 12:05 )             26.2     05-19    138  |  96  |  86.1[H]  ----------------------------<  88  3.6   |  20.0[L]  |  5.73[H]    Ca    8.2[L]      19 May 2025 12:05    TPro  7.1  /  Alb  4.4  /  TBili  1.3  /  DBili  x   /  AST  21  /  ALT  17  /  AlkPhos  95  05-19    PT/INR - ( 19 May 2025 12:05 )   PT: 15.2 sec;   INR: 1.31 ratio         PTT - ( 19 May 2025 12:05 )  PTT:34.0 sec  Urinalysis Basic - ( 19 May 2025 12:05 )    Color: x / Appearance: x / SG: x / pH: x  Gluc: 88 mg/dL / Ketone: x  / Bili: x / Urobili: x   Blood: x / Protein: x / Nitrite: x   Leuk Esterase: x / RBC: x / WBC x   Sq Epi: x / Non Sq Epi: x / Bacteria: x      INTERPRETATION OF TELEMETRY: Afib    ECG:

## 2025-05-19 NOTE — ED PROVIDER NOTE - PROGRESS NOTE DETAILS
Pt with new oxygen requirements. Now on 3L NC. Repeat Trop ordered. Pt denying chest pain. CXR significant for bilateral perihilar congestion. Cards and Nephro consulted, ASA given.

## 2025-05-19 NOTE — ED PROVIDER NOTE - ATTENDING CONTRIBUTION TO CARE
64-year-old male; with PMH significant for ESRD on HD (T/TH/SAT via right chest wall catheter, s/p LUE AV graft), A-fib (s/p ablation), LAAO (watchman), CAD s/p CABG x2, bioprosthetic AVR/MR repair, MV endocarditis 2023, type A aortic dissection s/p repair w/ ischemic bowel s/p resection, seizure disorder, hx substance abuse, leadless pacemaker; now presenting with progressively worsening dyspnea on exertion over the past 2 to 3 days.  Denies chest pain.  Denies palpitations.  Denies fever, chills, sweats.  Denies any cough.  Reports mild nausea today.  Patient reports he did miss 1 day of dialysis but did have a make-up session last week.  General:  mild resp distress  Head:     NC/AT, EOMI, oral mucosa moist  Neck:     trachea midline  Lungs:     bibasilar crackles  CVS:     S1S2, RRR, no m/g/r  Abd:     +BS, s/nt/nd, no organomegaly  Ext:    2+ radial and pedal pulses, trace bilateral LE edema  A/P: 64yoM p/w lara  -labs, ekg, xray diurese, likely will need dialysis

## 2025-05-19 NOTE — PATIENT PROFILE ADULT - MST SCORE
PT-7A- Cancel, pt declines PT, reports fatigue from returning from bathroom, and anticipates leaving for MRI shortly.   0

## 2025-05-19 NOTE — ED ADULT TRIAGE NOTE - CHIEF COMPLAINT QUOTE
C/O shortness of breath x2 days. Denies chest pain. Pt states he was referred by MD due to high blood pressure and elevated heart rate. Hx of quadrupole bypass, valve replacement, and pacemaker.

## 2025-05-19 NOTE — ED PROVIDER NOTE - OBJECTIVE STATEMENT
63 yo M with PMH of Leadless Pacemaker via RFV access on 4/15/2025,  ESRD (HD T/Th/S right chest wall cath, LUE graft), Afib s/p ablation, LAAO (watchman), CAD s/p CABG x2, bioprosthetic AVR/MR repair, MV endocarditis 2023, type A aortic dissection s/p repair w/ ischemic bowel s/p resection, seizure disorder, hx substance abuse, presenting with ZUNIGA. Pt reports he was in usual state of health, went to car show yesterday and was feeling sob while walking which is new for him. At this time pt endorsing some dyspnea, and nausea, but denies chest pain or pressure. Pt states he missed dialysis last week, but had two sessions back to back to make up for it (he is unsure which days), last HD was Saturday. States his weight is up by approx 5 pounds. Pt had follow up with CardiologistJostin today where he was found to be hypertensive and tachycardic. Pt reports being unable to take his medications this AM due to nausea. Pt supposed to be on AC, but stopped taking it on by own accord 2 months ago and states his cardiologist is aware - is currently only on ASA. Denies fevers, chills, cough, rhinorrhea, chest pain, emesis, abdominal pain, dysuria, hematuria, LE swelling, recent travel, sick contacts, black stool.

## 2025-05-19 NOTE — H&P ADULT - NSHPPHYSICALEXAM_GEN_ALL_CORE
PHYSICAL EXAM:  Vital Signs Last 24 Hrs  T(F): 97.8 (19 May 2025 15:59), Max: 97.8 (19 May 2025 15:59)  HR: 97 (19 May 2025 15:59) (89 - 98)  BP: 174/103 (19 May 2025 15:59) (174/103 - 203/106)  RR: 19 (19 May 2025 15:59) (19 - 22)  SpO2: 97% (19 May 2025 15:59) (86% - 97%)    GENERAL: NAD, Resting in bed, Oxygen via NC  Eyes: EOMI, PERRLA  ENMT: Conjunctiva and sclera clear; supple neck, No JVD  Cardiovascular: S1,S2, RRR, No murmur  Respiratory: CTA B/L, Non-labored breathing  GI: Bowel sounds present; Soft, Nontender, Nondistended. No hepatomegaly  Genitourinary: Deferred  Skin:  no breakdowns, ulcers or discharge, No rashes or lesions, LUE AVF with thrill  Neurology: Alert & Oriented X3, non-focal and spontaneous movements of all extremities, CN 2 to 12 grossly intact   Psych: Appropriate mood and affect, calm, pleasant, Responds appropriately to questions

## 2025-05-19 NOTE — H&P ADULT - HISTORY OF PRESENT ILLNESS
63 yo M with PMH with hx of ESRD on HD, A. fib s/p ablation, LAAO device, CAD s/p CABG x2, bioprosthetic AVR/MR repair, MV endocarditis 2023, type A aortic dissection s/p repair w/ ischemic bowel s/p resection, hx substance abuse, presenting with shortness of breath. Patient reports that these symptoms started yesterday and today he went to a scheduled appointment with his cardiologist, who subsequently referred him to the hospital due to his breathing difficulties. Patient reports dietary and medication non-discretion this past week. Was placed on oxygen. Admitted to medicine for further management.

## 2025-05-19 NOTE — CONSULT NOTE ADULT - PROBLEM SELECTOR RECOMMENDATION 3
.  - CABG , AVR, MVR 2020  - MV endocarditis 2023  - s/p leadless PPM for bradycardia  - cont asa/statin  - Troponin 117, history of elevated troponin, in setting of ESRD

## 2025-05-19 NOTE — CONSULT NOTE ADULT - SUBJECTIVE AND OBJECTIVE BOX
Patient is a 64y old  Male who presents with a chief complaint of SOB, found  with Acute hypoxic respiratory failure (19 May 2025 17:09)      HPI:  63 yo M with PMH with hx of ESRD on HD, A. fib s/p ablation, LAAO device, CAD s/p CABG x2, bioprosthetic AVR/MR repair, MV endocarditis 2023, type A aortic dissection s/p repair w/ ischemic bowel s/p resection, hx substance abuse, presenting with shortness of breath. Patient reports that these symptoms started yesterday and today he went to a scheduled appointment with his cardiologist, who subsequently referred him to the hospital due to his breathing difficulties. Patient reports dietary and medication non-discretion this past week. Ate without any reswstriction  Was placed on oxygen. Admitted to medicine for further management.  (19 May 2025 17:09)      PAST MEDICAL & SURGICAL HISTORY:  CHF (congestive heart failure)      CVA (cerebral vascular accident)      Seizures  last seizure 10 years ago      HTN (hypertension)      Hyperlipemia      COVID-19  october 2020      Atrial fibrillation      Former smoker      History of cocaine use  remote hx, currently sober      H/O aortic dissection  s/p repair (2010), surgery complicated by bowel ischemia s/p bowel resection and ostomy with reversal      H/O aortic valve insufficiency      Mitral regurgitation      GIB (gastrointestinal bleeding)      CAD (coronary artery disease)  s/p PCI 1998  and CABG x 2 11/2020      Chronic atrial fibrillation      Aneurysm of artery of upper extremity      Anemia of chronic disease      End stage renal disease      Myocardial infarction      COVID-19 vaccine series completed      Port-A-Cath in place      H/O colectomy      Status post double vessel coronary artery bypass  11/2020 Dr Butler      S/P AVR (aortic valve replacement)  (t)AVR 11/2020 Dr Butler      S/P MVR (mitral valve replacement)  (t)MVR 11/2020 Dr Butler      H/O aortic dissection  Type A; emergent repair 2010      AV fistula  LUE      History of renal transplant      Presence of Watchman left atrial appendage closure device          FAMILY HISTORY:  FH: hypertension    Family history of cardiac disorder (Mother)  mother      REVIEW OF SYSTEMS:  CONSTITUTIONAL: No fever, weight loss, or fatigue  EYES: No eye pain, No visual disturbances,   RESPIRATORY: No cough, hemoptysis; + SOB  CARDIOVASCULAR: No chest pain, No palpitations,   Has some leg swelling  GASTROINTESTINAL: No abdominal , NVD or GIB  GENITOURINARY: No UTI sx/hematuria  NEUROLOGICAL: No HA/wk/numbness  MUSCULOSKELETAL: No joint pain, No swelling      Vital Signs Last 24 Hrs  T(C): 37 (19 May 2025 18:25), Max: 37 (19 May 2025 18:25)  T(F): 98.6 (19 May 2025 18:25), Max: 98.6 (19 May 2025 18:25)  HR: 69 (19 May 2025 18:25) (69 - 98)  BP: 197/114 (19 May 2025 18:25) (174/103 - 203/106)  BP(mean): --  RR: 19 (19 May 2025 18:25) (19 - 22)  SpO2: 100% (19 May 2025 18:25) (86% - 100%)    Parameters below as of 19 May 2025 18:25  Patient On (Oxygen Delivery Method): nasal cannula  O2 Flow (L/min): 3      PHYSICAL EXAM:    GENERAL: NAD,   EYES:  conjunctiva and sclera clear  NECK: Supple, No JVD/Carotid Bruit -ve   NERVOUS SYSTEM:  A/O x3,   Lungs : bibasilar rales, No rhonchi,   HEART:  gr 2 murmur,  No rub, No gallops  ABDOMEN: Soft, NT/ND BS+  EXTREMITIES:  + Peripheral Pulses, mild edema  SKIN: No rashes or lesions      LABS:                        8.4    7.29  )-----------( 126      ( 19 May 2025 12:05 )             26.2     05-19    138  |  96  |  86.1[H]  ----------------------------<  88  3.6   |  20.0[L]  |  5.73[H]    Ca    8.2[L]      19 May 2025 12:05    TPro  7.1  /  Alb  4.4  /  TBili  1.3  /  DBili  x   /  AST  21  /  ALT  17  /  AlkPhos  95  05-19    PT/INR - ( 19 May 2025 12:05 )   PT: 15.2 sec;   INR: 1.31 ratio         PTT - ( 19 May 2025 12:05 )  PTT:34.0 sec  Urinalysis Basic - ( 19 May 2025 12:05 )    Color: x / Appearance: x / SG: x / pH: x  Gluc: 88 mg/dL / Ketone: x  / Bili: x / Urobili: x   Blood: x / Protein: x / Nitrite: x   Leuk Esterase: x / RBC: x / WBC x   Sq Epi: x / Non Sq Epi: x / Bacteria: x          RADIOLOGY & ADDITIONAL TESTS:    MEDICATIONS  (STANDING):  acetaminophen     Tablet .. PRN  amLODIPine   Tablet  aspirin enteric coated  atorvastatin  carvedilol  cloNIDine  heparin   Injectable  hydrALAZINE  isosorbide   mononitrate ER Tablet (IMDUR)  levETIRAcetam  melatonin PRN  ondansetron Injectable PRN  pantoprazole    Tablet  tamsulosin  torsemide

## 2025-05-19 NOTE — H&P ADULT - ASSESSMENT
65 yo M with PMH with hx of ESRD on HD, A. fib s/p ablation, LAAO device, CAD s/p CABG x2, bioprosthetic AVR/MR repair, MV endocarditis 2023, type A aortic dissection s/p repair w/ ischemic bowel s/p resection, hx substance abuse, presenting with shortness of breath. Patient reports that these symptoms started yesterday and today he went to a scheduled appointment with his cardiologist, who subsequently referred him to the hospital due to his breathing difficulties. Patient reports dietary and medication non-discretion this past week. Was placed on oxygen. Admitted to medicine for further management.     #Acute hypoxic respiratory failure secondary to fluid overload from HFpEF and ESRD  Medication and dietary non-discretion  Tele monitoring  Strict in and out  Lasix 40 IV P given in ED  CXR appears to have fluid overload  CT chest is ordered  TTE  ProBNP elevated  Trops elevated in setting of ESRD  Cardiology on board  Renal on board for HD  Nutrition consult  C/w Coreg, Torsemide  Oxygen Via NC - Wean as tolerated    #ESRD on HD  #Anemia of chronic disease  c/W HD per renal    #BPH  C/w Flomax    #CAD, CABG, HTN  C/W ASA  c/W Clonidine, imdur, Coreg, Amlodipine, Torsemide, hydralazine    #Seizure  C/w Keppra    DVT prophylaxis: Heparin    Dispo: pending respiratory improvement

## 2025-05-19 NOTE — ED ADULT NURSE NOTE - OBJECTIVE STATEMENT
Pt presents to ed c/o sob and "feeling off" x 2 days. Pt sent by cardiologist for evaluation of shortness of breath. Pt reports having a routine cardiologist appointment where we was found to be hypertensive, prompting his cardiologist to send him to [facility] for further evaluation. Pt ax4, denies complaints otherwise, denies chest pain/palpitations. Tele/ maintained. Vitals as documented. Safety maintained.

## 2025-05-19 NOTE — CONSULT NOTE ADULT - ASSESSMENT
63 y/o male ESRD HD T,Th,S, atrial fibrillation s/p ablation (PVI, CTI, Mitral line, 2/2021), LAAO with Watchman device 8/2024, type A aortic dissection s/p repair 11/2020 c/b ischemic bowel s/p resection, CAD s/p re-op sternotomy CABG x2 and bioprosthetic  AVR/ MV repair, former substance use disorder, MV endocarditis 2023, recent hospitalization 3/18 - 3/21 due to malfunctioning LUE fistula now s/p R sided IR permacath placement, bradycardia now s/p micra PPM 4/25 who now presents to [facility] with c/o dyspnea on exertion. Seen by out patient cardiology team today, sent to hospital for work up elevated BP, dyspnea. Pt reports he was in usual state of health, went to car show yesterday and was feeling sob while walking which is new for him. At this time pt endorsing some dyspnea, and nausea. Pt states he missed dialysis last week, but had two sessions back to back to make up for it (can not recall days). Admits to dietary indiscretions over the weekend. Admits to 5 lbs weight gain. Did not take medications secondary to nausea this morning.

## 2025-05-19 NOTE — ED PROVIDER NOTE - PHYSICAL EXAMINATION
General: NAD, chronically ill appearing  HEENT: Normocephalic, atraumatic, PERRL, EOM intact  Neck: No apparent stiffness or JVD  Pulm: Chest wall symmetric and nontender, lungs with rhales bilaterally, rhonchorous breath sounds, saturating 95% on RA  Cardiac: Regular rate and regular rhythm, 2+ radial pulses bilaterally  Abdomen: Soft, nontender, nondistended   Skin: Skin is warm, dry and intact without rashes or lesions.  Neuro: No motor or sensory deficits above reported baseline, AOx3, no facial droop, no dysarthria, moves all extremities, symmetric sensation to bilateral extremities   MSK: No deformity or tenderness above reported baseline, no LE pitting edema

## 2025-05-19 NOTE — PATIENT PROFILE ADULT - FALL HARM RISK - UNIVERSAL INTERVENTIONS
Bed in lowest position, wheels locked, appropriate side rails in place/Call bell, personal items and telephone in reach/Instruct patient to call for assistance before getting out of bed or chair/Non-slip footwear when patient is out of bed/[employer] to call system/Physically safe environment - no spills, clutter or unnecessary equipment/Purposeful Proactive Rounding/Room/bathroom lighting operational, light cord in reach

## 2025-05-19 NOTE — ED PROVIDER NOTE - CLINICAL SUMMARY MEDICAL DECISION MAKING FREE TEXT BOX
63 yo M with PMH of Leadless Pacemaker via RFV access on 4/15/2025,  ESRD (HD T/Th/S right chest wall cath, LUE graft), Afib s/p ablation, LAAO (watchman), CAD s/p CABG x2, bioprosthetic AVR/MR repair, MV endocarditis 2023, type A aortic dissection s/p repair w/ ischemic bowel s/p resection, seizure disorder, hx substance abuse, presenting with ZUNIGA.  On exam pt HDS, saturating 95% on RA, breath sounds rhonchorous bilaterally, abdomen soft, no LE edema. EKG similar to previous, with slightly more pronounced T waves. Given hx of anemia requiring transfusions ordered type and screen, ordered Trop, BNP, CXR.

## 2025-05-19 NOTE — CONSULT NOTE ADULT - PROBLEM SELECTOR RECOMMENDATION 2
5 minutes spent counseling the patient on smoking cessation and he is not currently ready to stop smoking. He will be offereded a nicotine transdermal patch while hospitalized and monitored for withdrawal.  Will provide additional smoking cessation counseling prior to discharge.    .  - unable to take meds secondary to nausea  - cont home regimen if patient tolerates  - hydralazine IV PRN for BP > 180

## 2025-05-20 ENCOUNTER — APPOINTMENT (OUTPATIENT)
Dept: VASCULAR SURGERY | Facility: CLINIC | Age: 65
End: 2025-05-20

## 2025-05-20 DIAGNOSIS — I50.21 ACUTE SYSTOLIC (CONGESTIVE) HEART FAILURE: ICD-10-CM

## 2025-05-20 LAB
ANION GAP SERPL CALC-SCNC: 16 MMOL/L — SIGNIFICANT CHANGE UP (ref 5–17)
ANION GAP SERPL CALC-SCNC: 19 MMOL/L — HIGH (ref 5–17)
BASOPHILS # BLD AUTO: 0.04 K/UL — SIGNIFICANT CHANGE UP (ref 0–0.2)
BASOPHILS NFR BLD AUTO: 0.7 % — SIGNIFICANT CHANGE UP (ref 0–2)
BLD GP AB SCN SERPL QL: SIGNIFICANT CHANGE UP
BUN SERPL-MCNC: 67.8 MG/DL — HIGH (ref 8–20)
BUN SERPL-MCNC: 71.9 MG/DL — HIGH (ref 8–20)
CALCIUM SERPL-MCNC: 8 MG/DL — LOW (ref 8.4–10.5)
CALCIUM SERPL-MCNC: 8 MG/DL — LOW (ref 8.4–10.5)
CHLORIDE SERPL-SCNC: 97 MMOL/L — SIGNIFICANT CHANGE UP (ref 96–108)
CHLORIDE SERPL-SCNC: 98 MMOL/L — SIGNIFICANT CHANGE UP (ref 96–108)
CO2 SERPL-SCNC: 22 MMOL/L — SIGNIFICANT CHANGE UP (ref 22–29)
CO2 SERPL-SCNC: 23 MMOL/L — SIGNIFICANT CHANGE UP (ref 22–29)
CREAT SERPL-MCNC: 5.01 MG/DL — HIGH (ref 0.5–1.3)
CREAT SERPL-MCNC: 5.34 MG/DL — HIGH (ref 0.5–1.3)
EGFR: 11 ML/MIN/1.73M2 — LOW
EGFR: 11 ML/MIN/1.73M2 — LOW
EGFR: 12 ML/MIN/1.73M2 — LOW
EGFR: 12 ML/MIN/1.73M2 — LOW
EOSINOPHIL # BLD AUTO: 0.42 K/UL — SIGNIFICANT CHANGE UP (ref 0–0.5)
EOSINOPHIL NFR BLD AUTO: 6.9 % — HIGH (ref 0–6)
GAS PNL BLDA: SIGNIFICANT CHANGE UP
GLUCOSE SERPL-MCNC: 85 MG/DL — SIGNIFICANT CHANGE UP (ref 70–99)
GLUCOSE SERPL-MCNC: 86 MG/DL — SIGNIFICANT CHANGE UP (ref 70–99)
HCT VFR BLD CALC: 24 % — LOW (ref 39–50)
HGB BLD-MCNC: 7.6 G/DL — LOW (ref 13–17)
IMM GRANULOCYTES # BLD AUTO: 0.03 K/UL — SIGNIFICANT CHANGE UP (ref 0–0.07)
IMM GRANULOCYTES NFR BLD AUTO: 0.5 % — SIGNIFICANT CHANGE UP (ref 0–0.9)
LYMPHOCYTES # BLD AUTO: 0.69 K/UL — LOW (ref 1–3.3)
LYMPHOCYTES NFR BLD AUTO: 11.4 % — LOW (ref 13–44)
MAGNESIUM SERPL-MCNC: 1.6 MG/DL — LOW (ref 1.8–2.6)
MCHC RBC-ENTMCNC: 31.4 PG — SIGNIFICANT CHANGE UP (ref 27–34)
MCHC RBC-ENTMCNC: 31.7 G/DL — LOW (ref 32–36)
MCV RBC AUTO: 99.2 FL — SIGNIFICANT CHANGE UP (ref 80–100)
MONOCYTES # BLD AUTO: 0.44 K/UL — SIGNIFICANT CHANGE UP (ref 0–0.9)
MONOCYTES NFR BLD AUTO: 7.2 % — SIGNIFICANT CHANGE UP (ref 2–14)
MRSA PCR RESULT.: SIGNIFICANT CHANGE UP
NEUTROPHILS # BLD AUTO: 4.45 K/UL — SIGNIFICANT CHANGE UP (ref 1.8–7.4)
NEUTROPHILS NFR BLD AUTO: 73.3 % — SIGNIFICANT CHANGE UP (ref 43–77)
NRBC # BLD AUTO: 0 K/UL — SIGNIFICANT CHANGE UP (ref 0–0)
NRBC # FLD: 0 K/UL — SIGNIFICANT CHANGE UP (ref 0–0)
NRBC BLD AUTO-RTO: 0 /100 WBCS — SIGNIFICANT CHANGE UP (ref 0–0)
PHOSPHATE SERPL-MCNC: 4.4 MG/DL — SIGNIFICANT CHANGE UP (ref 2.4–4.7)
PLATELET # BLD AUTO: 107 K/UL — LOW (ref 150–400)
PMV BLD: 10.8 FL — SIGNIFICANT CHANGE UP (ref 7–13)
POTASSIUM SERPL-MCNC: 3.5 MMOL/L — SIGNIFICANT CHANGE UP (ref 3.5–5.3)
POTASSIUM SERPL-MCNC: 3.9 MMOL/L — SIGNIFICANT CHANGE UP (ref 3.5–5.3)
POTASSIUM SERPL-SCNC: 3.5 MMOL/L — SIGNIFICANT CHANGE UP (ref 3.5–5.3)
POTASSIUM SERPL-SCNC: 3.9 MMOL/L — SIGNIFICANT CHANGE UP (ref 3.5–5.3)
RBC # BLD: 2.42 M/UL — LOW (ref 4.2–5.8)
RBC # FLD: 16 % — HIGH (ref 10.3–14.5)
S AUREUS DNA NOSE QL NAA+PROBE: SIGNIFICANT CHANGE UP
SODIUM SERPL-SCNC: 136 MMOL/L — SIGNIFICANT CHANGE UP (ref 135–145)
SODIUM SERPL-SCNC: 138 MMOL/L — SIGNIFICANT CHANGE UP (ref 135–145)
WBC # BLD: 6.07 K/UL — SIGNIFICANT CHANGE UP (ref 3.8–10.5)
WBC # FLD AUTO: 6.07 K/UL — SIGNIFICANT CHANGE UP (ref 3.8–10.5)

## 2025-05-20 PROCEDURE — 99232 SBSQ HOSP IP/OBS MODERATE 35: CPT

## 2025-05-20 PROCEDURE — 99233 SBSQ HOSP IP/OBS HIGH 50: CPT

## 2025-05-20 RX ORDER — MAGNESIUM SULFATE 500 MG/ML
2 SYRINGE (ML) INJECTION ONCE
Refills: 0 | Status: COMPLETED | OUTPATIENT
Start: 2025-05-20 | End: 2025-05-20

## 2025-05-20 RX ORDER — AMLODIPINE BESYLATE 10 MG/1
5 TABLET ORAL DAILY
Refills: 0 | Status: DISCONTINUED | OUTPATIENT
Start: 2025-05-21 | End: 2025-05-22

## 2025-05-20 RX ORDER — METHOCARBAMOL 500 MG/1
250 TABLET, FILM COATED ORAL ONCE
Refills: 0 | Status: COMPLETED | OUTPATIENT
Start: 2025-05-20 | End: 2025-05-20

## 2025-05-20 RX ADMIN — AMLODIPINE BESYLATE 10 MILLIGRAM(S): 10 TABLET ORAL at 06:04

## 2025-05-20 RX ADMIN — Medication 0.3 MILLIGRAM(S): at 06:03

## 2025-05-20 RX ADMIN — Medication 0.1 MILLIGRAM(S): at 17:55

## 2025-05-20 RX ADMIN — Medication 40 MILLIGRAM(S): at 06:04

## 2025-05-20 RX ADMIN — CARVEDILOL 6.25 MILLIGRAM(S): 3.12 TABLET, FILM COATED ORAL at 17:55

## 2025-05-20 RX ADMIN — ATORVASTATIN CALCIUM 40 MILLIGRAM(S): 80 TABLET, FILM COATED ORAL at 21:23

## 2025-05-20 RX ADMIN — Medication 100 MILLIEQUIVALENT(S): at 04:59

## 2025-05-20 RX ADMIN — HEPARIN SODIUM 5000 UNIT(S): 1000 INJECTION INTRAVENOUS; SUBCUTANEOUS at 13:40

## 2025-05-20 RX ADMIN — TAMSULOSIN HYDROCHLORIDE 0.4 MILLIGRAM(S): 0.4 CAPSULE ORAL at 21:23

## 2025-05-20 RX ADMIN — HEPARIN SODIUM 5000 UNIT(S): 1000 INJECTION INTRAVENOUS; SUBCUTANEOUS at 06:03

## 2025-05-20 RX ADMIN — LEVETIRACETAM 500 MILLIGRAM(S): 10 INJECTION, SOLUTION INTRAVENOUS at 06:04

## 2025-05-20 RX ADMIN — LEVETIRACETAM 500 MILLIGRAM(S): 10 INJECTION, SOLUTION INTRAVENOUS at 17:55

## 2025-05-20 RX ADMIN — Medication 100 MILLIGRAM(S): at 06:03

## 2025-05-20 RX ADMIN — Medication 20 MILLIGRAM(S): at 06:03

## 2025-05-20 RX ADMIN — HEPARIN SODIUM 5000 UNIT(S): 1000 INJECTION INTRAVENOUS; SUBCUTANEOUS at 21:22

## 2025-05-20 RX ADMIN — Medication 100 MILLIEQUIVALENT(S): at 02:26

## 2025-05-20 RX ADMIN — Medication 25 GRAM(S): at 06:02

## 2025-05-20 RX ADMIN — Medication 650 MILLIGRAM(S): at 06:04

## 2025-05-20 RX ADMIN — Medication 650 MILLIGRAM(S): at 06:58

## 2025-05-20 RX ADMIN — Medication 100 MILLIEQUIVALENT(S): at 03:28

## 2025-05-20 RX ADMIN — Medication 81 MILLIGRAM(S): at 13:40

## 2025-05-20 RX ADMIN — METHOCARBAMOL 250 MILLIGRAM(S): 500 TABLET, FILM COATED ORAL at 01:19

## 2025-05-20 NOTE — PROGRESS NOTE ADULT - ASSESSMENT
63 yo M with PMH with hx of ESRD on HD, A. fib s/p ablation, LAAO device, CAD s/p CABG x2, bioprosthetic AVR/MR repair, MV endocarditis 2023, type A aortic dissection s/p repair w/ ischemic bowel s/p resection, hx substance abuse, presenting with shortness of breath. Patient reports that these symptoms started yesterday and today he went to a scheduled appointment with his cardiologist, who subsequently referred him to the hospital due to his breathing difficulties. Patient reports dietary and medication non-discretion this past week. Was placed on oxygen. Admitted to medicine for further management.       1-Acute hypoxic respiratory failure secondary to fluid overload from CHF   TTE with new CM EF 35-40%   new drop in EF   cardiology consulted plan for LHC / RHC in am ?   cont diuretuics , home meds coreg .  weight daily   strict intake output   Renal on board for H  Oxygen Via NC -as needed     2-ESRD on HD  cont HD per nephrology     3- HTN uncontrolled on admission   today with low soft BP   decrease clonidine     4-Anemia of chronic disease  c/W HD per renal  may need 1 unit PRBC with HD tomorrow keep hb over 8     5- h/o CAD, CABG  C/W ASA  c/W  imdur, Coreg,, Torsemide, hydralazine    6-Seizure  C/w Keppra    DVT prophylaxis: Heparin sq     Dispo: once stable cardiac work up completed   CHF managament     discharge in 2-3 days expected

## 2025-05-20 NOTE — CHART NOTE - NSCHARTNOTEFT_GEN_A_CORE
PA NOTE-MEDICINE      Pt c/o B/L LE cramping.  Pt is + ESRD.  Electrolytes ordered   Robaxin 250 mg pom x 1 Stat     Will Follow Labs   Continue to monitor Pt   Will sign out to AM Team

## 2025-05-20 NOTE — PROGRESS NOTE ADULT - SUBJECTIVE AND OBJECTIVE BOX
Patient seen and examined    Feels better  no c/o CP SOB much less even carrero[ine, no NV   No swelling feet  Had difficulty with venous needloe    Vital Signs Last 24 Hrs  T(C): 36.6 (20 May 2025 19:42), Max: 36.6 (20 May 2025 19:42)  T(F): 97.9 (20 May 2025 19:42), Max: 97.9 (20 May 2025 19:42)  HR: 67 (20 May 2025 19:42) (61 - 69)  BP: 123/68 (20 May 2025 19:42) (96/54 - 136/77)  BP(mean): --  RR: 20 (20 May 2025 19:42) (20 - 20)  SpO2: 91% (20 May 2025 19:42) (91% - 94%)    Parameters below as of 20 May 2025 19:42  Patient On (Oxygen Delivery Method): room air        PHYSICAL EXAM    GENERAL: NAD,   EYES:  conjunctiva and sclera clear  NECK: Supple, No JVD/Bruit  NERVOUS SYSTEM:  A/O x3,   CHEST:  Only occ rales, No rhonchi  HEART:  RRR, gr 2 murmur  ABDOMEN: Soft, NT/ND BS+  EXTREMITIES:  No Edema;  SKIN: No rashes    20 May 2025 13:06    136    |  98     |  71.9   ----------------------------<  86     3.9     |  22.0   |  5.34     Ca    8.0        20 May 2025 13:06  Phos  4.4       20 May 2025 01:00  Mg     1.6       20 May 2025 01:00    TPro  7.1    /  Alb  4.4    /  TBili  1.3    /  DBili  x      /  AST  21     /  ALT  17     /  AlkPhos  95     19 May 2025 12:05                          7.6    6.07  )-----------( 107      ( 20 May 2025 01:00 )             24.0

## 2025-05-20 NOTE — PROGRESS NOTE ADULT - SUBJECTIVE AND OBJECTIVE BOX
[facility]                                                         CARDIOLOGY AT [facility]                                                                  [address], [place] [address]                                                         Telephone: [phone]. Fax:[phone]                                                                             PROGRESS NOTE    Reason for follow up: HFrEF , pulm HTN   Update: Pt seen and examined at bedside. Denies any new complaints. Went for HD yesterday. BP is now low normal.     Review of symptoms:   Cardiac:  No chest pain. +dyspnea. No palpitations.  Respiratory: no cough. +dyspnea  Gastrointestinal: No diarrhea. No abdominal pain. No bleeding.   Neuro: No focal neuro complaints.    Vitals:  T(C): 36.2 (05-20-25 @ 11:18), Max: 37 (05-19-25 @ 18:25)  HR: 66 (05-20-25 @ 11:18) (64 - 97)  BP: 99/54 (05-20-25 @ 11:18) (96/54 - 197/114)  RR: 20 (05-20-25 @ 11:18) (17 - 20)  SpO2: 93% (05-20-25 @ 11:18) (91% - 100%)  Wt(kg): --  I&O's Summary    19 May 2025 07:01  -  20 May 2025 07:00  --------------------------------------------------------  IN: 0 mL / OUT: 600 mL / NET: -600 mL      Weight (kg): 63.5 (05-19 @ 10:53)    PHYSICAL EXAM:  Appearance: Comfortable. No acute distress  HEENT:  Atraumatic. Normocephalic.  Normal oral mucosa  Neurologic: A & O x 3, no gross focal deficits.  Cardiovascular: RRR S1 S2, No murmur, no rubs/gallops. No JVD  Respiratory: coarse breath sounds, unlabored   Gastrointestinal:  Soft, Non-tender, + BS  Lower Extremities: 2+ Peripheral Pulses, No edema  Psychiatry: Patient is calm. No agitation.   Skin: warm and dry.    CURRENT CARDIAC MEDICATIONS:  amLODIPine   Tablet 10 milliGRAM(s) Oral daily  carvedilol 6.25 milliGRAM(s) Oral every 12 hours  cloNIDine 0.3 milliGRAM(s) Oral two times a day  hydrALAZINE 100 milliGRAM(s) Oral two times a day  isosorbide   mononitrate ER Tablet (IMDUR) 60 milliGRAM(s) Oral daily  torsemide 20 milliGRAM(s) Oral daily      CURRENT OTHER MEDICATIONS:  acetaminophen     Tablet .. 650 milliGRAM(s) Oral every 6 hours PRN Temp greater or equal to 38C (100.4F), Mild Pain (1 - 3)  levETIRAcetam 500 milliGRAM(s) Oral two times a day  melatonin 3 milliGRAM(s) Oral at bedtime PRN Insomnia  ondansetron Injectable 4 milliGRAM(s) IV Push every 8 hours PRN Nausea and/or Vomiting  pantoprazole    Tablet 40 milliGRAM(s) Oral before breakfast  atorvastatin 40 milliGRAM(s) Oral at bedtime  aspirin enteric coated 81 milliGRAM(s) Oral daily  chlorhexidine 2% Cloths 1 Application(s) Topical <User Schedule>  heparin   Injectable 5000 Unit(s) SubCutaneous every 8 hours  tamsulosin 0.4 milliGRAM(s) Oral at bedtime      LABS:	 	                            7.6    6.07  )-----------( 107      ( 20 May 2025 01:00 )             24.0     05-20    138  |  97  |  67.8[H]  ----------------------------<  85  3.5   |  23.0  |  5.01[H]    Ca    8.0[L]      20 May 2025 01:00  Phos  4.4     05-20  Mg     1.6     05-20    TPro  7.1  /  Alb  4.4  /  TBili  1.3  /  DBili  x   /  AST  21  /  ALT  17  /  AlkPhos  95  05-19    PT/INR/PTT ( 19 May 2025 12:05 )                       :                       :      15.2         :       34.0                  .        .                   .              .           .       1.31        .                                         TELEMETRY: paced, PVCs with bigeminy and trigeminy overnight , non sustained, no recurrence of ventricular ectopy today     DIAGNOSTIC TESTING:  [x ] Echocardiogram: < from: TTE Limited W or WO Ultrasound Enhancing Agent (05.19.25 @ 17:14) >  CONCLUSIONS:      1. Septal motion is abnormal consistent with previous cardiac surgery. Left ventricular systolic function is moderately to severely decreased with an ejection fraction visually estimated at 35 to 40 %.   2. Moderate to severe left ventricular hypertrophy.   3. Normal right ventricular cavity size, with normal wall thickness, and normal right ventricular systolic function.   4. A bioprosthetic valve replacement valve replacement is present in the aortic position The prosthetic valve is well seated with normal function.   5. Bioprosthetic valve present. in the mitral position. Well seated prosthetic mitral valve with normal function. There is trace intravalvular mitral regurgitation.   6. Estimated pulmonary artery systolic pressure is 83 mmHg, consistent with severe pulmonary hypertension.   7. The inferior vena cava is dilated measuring 2.53 cm in diameter, (dilated >2.1cm) with abnormal inspiratory collapse (abnormal <50%) consistent with elevated right atrial pressure (~15, range 10-20mmHg).   8. No pericardial effusion seen.    < end of copied text >

## 2025-05-20 NOTE — PROGRESS NOTE ADULT - ASSESSMENT
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 Seen by out patient cardiology team today, sent to hospital for work up elevated BP, dyspnea. Pt reports he was in usual state of health, went to car show yesterday and was feeling sob while walking which is new for him. At this time pt endorsing some dyspnea, and nausea. Pt states he missed dialysis last week, but had two sessions back Pt went to HD yesterday. TTE reveals LVEF 35-40%, mod to severe LVH, bioprosthetic aortic and mitral valves, severe pulm HTN PASP 83 mmHg, IVC dilation with abnormal inspiratory collapse.

## 2025-05-20 NOTE — PROGRESS NOTE ADULT - PROBLEM SELECTOR PLAN 1
- Pt still with dyspnea, denies orthopnea  - no edema on exam today   - TTE with newly reduced EF 35-40%, severe pulm HTN  - CT Chest- Diffuse indistinct groundglass opacities and interlobular septal thickening, likely due to interstitial lung edema. Small bibasilar pleural effusions, relatively unchanged.  - Pro BNP 50K  - ? missed dialysis last week, states had two days in a row to make up for missed session, can not recall days  - Resumed HD yesterday. Continue fluid removal with HD.   - cont home medications hydralazine, imdur, coreg, torsemide   - Recommend LHC/RHC when stable. Reassess in AM

## 2025-05-20 NOTE — CHART NOTE - NSCHARTNOTEFT_GEN_A_CORE
PA NOTE-MEDICINE      Called by RN due to Pt Experiencing 4 Beats of Non Sustained V-Tach.  Pt Asymptomatic  VSS   Ordered Electrolytes for AM  Continue to Monitor Pt   Recall PA for any Reoccurrence or for any changes in Pt Status   Will Sign out to AM Team to Follow PA NOTE-MEDICINE      Called by RN due to Pt Experiencing 4 Beats of Non Sustained V-Tach along with B/L LE Cramping.   Pt Asymptomatic  VSS   Ordered Electrolytes Stat  Continue to Monitor Pt   Recall PA for any Reoccurrence or for any changes in Pt Status   Will Follow Labs  Will Sign out to AM Team to Follow    Addendum:                           7.6    6.07  )-----------( 107      ( 20 May 2025 01:00 )             24.0     05-20    138  |  97  |  67.8[H]  ----------------------------<  85  3.5   |  23.0  |  5.01[H]    Ca    8.0[L]      20 May 2025 01:00  Phos  4.4     05-20  Mg     1.6     05-20    Repleted Mag/Potassium   Continue to monitor Pt        PTT - ( 19 May 2025 12:05 )  PTT:34.0 sec    LIVER FUNCTIONS - ( 19 May 2025 12:05 )  Alb: 4.4 g/dL / Pro: 7.1 g/dL / ALK PHOS: 95 U/L / ALT: 17 U/L / AST: 21 U/L / GGT: x           Urinalysis Basic - ( 20 May 2025 01:00 )    Color: x / Appearance: x / SG: x / pH: x  Gluc: 85 mg/dL / Ketone: x  / Bili: x / Urobili: x   Blood: x / Protein: x / Nitrite: x   Leuk Esterase: x / RBC: x / WBC x   Sq Epi: x / Non Sq Epi: x / Bacteria: x      ABG - ( 20 May 2025 04:20 )  pH, Arterial: 7.430 pH, Blood: x     /  pCO2: 39    /  pO2: 60    / HCO3: 26    / Base Excess: 1.6   /  SaO2: 94.8 PA NOTE-MEDICINE      Called by RN due to Pt Experiencing 4 Beats of Non Sustained V-Tach along with B/L LE Cramping.   Pt Asymptomatic  VSS   Ordered Electrolytes Stat  Continue to Monitor Pt   Recall PA for any Reoccurrence or for any changes in Pt Status   Will Follow Labs  Will Sign out to AM Team to Follow    Addendum:                           7.6    6.07  )-----------( 107      ( 20 May 2025 01:00 )             24.0     05-20    138  |  97  |  67.8[H]  ----------------------------<  85  3.5   |  23.0  |  5.01[H]    Ca    8.0[L]      20 May 2025 01:00  Phos  4.4     05-20  Mg     1.6     05-20    Repleted Mag/Potassium   Continue to monitor Pt   Will sign out to AM Team to Follow

## 2025-05-20 NOTE — PROGRESS NOTE ADULT - ASSESSMENT
65 y/o male ESRD HD T,Th,S, atrial fibrillation s/p ablation (PVI, CTI, Mitral line, 2/2021), LAAO with Watchman device 8/2024, type A aortic dissection s/p repair 11/2020 c/b ischemic bowel s/p resection, CAD s/p re-op sternotomy CABG x2 and bioprosthetic  AVR/ MV repair, former substance use disorder, MV endocarditis 2023, recent hospitalization 3/18 - 3/21 due to malfunctioning LUE fistula now s/p R sided IR permacath placement, bradycardia now s/p micra PPM 4/25 who now presents to [facility] with c/o dyspnea on exertion. Seen by out patient cardiology team today, sent to hospital for work up elevated BP, dyspnea. Pt reports he was in usual state of health, went to car show yesterday and was feeling sob while walking which is new for him. At this time pt endorsing some dyspnea, and nausea. Pt states he missed dialysis last week, but had two sessions back to back to make up for it (can not recall days). Admits to dietary indiscretions over the weekend. Admits to 5 lbs weight gain. Did not take medications secondary to nausea this morning.     Pul edema/CHF - HD am and take off fluid as jerry, refusing even a short treatment today    Sevrere Htn - IV Hydz if bp > 190 - d/w RN    Anemia - add Retacrit and Venofer    Vascular eval called    Shall follow

## 2025-05-20 NOTE — PROGRESS NOTE ADULT - SUBJECTIVE AND OBJECTIVE BOX
Patient is a 64y old  Male who presents with a chief complaint of Acute hypoxic respiratory failure (20 May 2025 13:07)      Patient seen and examined at bedside in am   he is with c/o dyspnea admitted yesterday   cardiology consult appreciated       ALLERGIES:  penicillin (Other)    MEDICATIONS  (STANDING):  amLODIPine   Tablet 10 milliGRAM(s) Oral daily  aspirin enteric coated 81 milliGRAM(s) Oral daily  atorvastatin 40 milliGRAM(s) Oral at bedtime  carvedilol 6.25 milliGRAM(s) Oral every 12 hours  chlorhexidine 2% Cloths 1 Application(s) Topical <User Schedule>  cloNIDine 0.1 milliGRAM(s) Oral two times a day  heparin   Injectable 5000 Unit(s) SubCutaneous every 8 hours  hydrALAZINE 100 milliGRAM(s) Oral two times a day  isosorbide   mononitrate ER Tablet (IMDUR) 60 milliGRAM(s) Oral daily  levETIRAcetam 500 milliGRAM(s) Oral two times a day  pantoprazole    Tablet 40 milliGRAM(s) Oral before breakfast  tamsulosin 0.4 milliGRAM(s) Oral at bedtime  torsemide 20 milliGRAM(s) Oral daily    MEDICATIONS  (PRN):  acetaminophen     Tablet .. 650 milliGRAM(s) Oral every 6 hours PRN Temp greater or equal to 38C (100.4F), Mild Pain (1 - 3)  melatonin 3 milliGRAM(s) Oral at bedtime PRN Insomnia  ondansetron Injectable 4 milliGRAM(s) IV Push every 8 hours PRN Nausea and/or Vomiting    Vital Signs Last 24 Hrs  T(F): 97.2 (20 May 2025 11:18), Max: 98.6 (19 May 2025 18:25)  HR: 66 (20 May 2025 11:18) (64 - 97)  BP: 99/54 (20 May 2025 11:18) (96/54 - 197/114)  RR: 20 (20 May 2025 11:18) (17 - 20)  SpO2: 93% (20 May 2025 11:18) (91% - 100%)  I&O's Summary    19 May 2025 07:01  -  20 May 2025 07:00  --------------------------------------------------------  IN: 0 mL / OUT: 600 mL / NET: -600 mL        PHYSICAL EXAM:  General:   ENT:   Neck:   Lungs:   Cardio: RRR, S1/S2, No murmur  Abdomen: Soft, Nontender, Nondistended; Bowel sounds present  Extremities: No calf tenderness, No pitting edema    LABS:                        7.6    6.07  )-----------( 107      ( 20 May 2025 01:00 )             24.0     05-20    136  |  98  |  71.9  ----------------------------<  86  3.9   |  22.0  |  5.34    Ca    8.0      20 May 2025 13:06  Phos  4.4     05-20  Mg     1.6     05-20    TPro  7.1  /  Alb  4.4  /  TBili  1.3  /  DBili  x   /  AST  21  /  ALT  17  /  AlkPhos  95  05-19          PT/INR - ( 19 May 2025 12:05 )   PT: 15.2 sec;   INR: 1.31 ratio         PTT - ( 19 May 2025 12:05 )  PTT:34.0 sec              12:05 - VBG - pH: 7.360 | pCO2: 42    | pO2: 100   | Lactate: 0.70     ABG - ( 20 May 2025 04:20 )  pH, Arterial: 7.430 pH, Blood: x     /  pCO2: 39    /  pO2: 60    / HCO3: 26    / Base Excess: 1.6   /  SaO2: 94.8                        Urinalysis Basic - ( 20 May 2025 13:06 )    Color: x / Appearance: x / SG: x / pH: x  Gluc: 86 mg/dL / Ketone: x  / Bili: x / Urobili: x   Blood: x / Protein: x / Nitrite: x   Leuk Esterase: x / RBC: x / WBC x   Sq Epi: x / Non Sq Epi: x / Bacteria: x          RADIOLOGY & ADDITIONAL TESTS:       Patient is a 64y old  Male who presents with a chief complaint of Acute hypoxic respiratory failure (20 May 2025 13:07)      Patient seen and examined at bedside in am   he is with c/o dyspnea admitted yesterday   cardiology consult appreciated   TTe with decreased EF new       ALLERGIES:  penicillin (Other)    MEDICATIONS  (STANDING):  amLODIPine   Tablet 10 milliGRAM(s) Oral daily  aspirin enteric coated 81 milliGRAM(s) Oral daily  atorvastatin 40 milliGRAM(s) Oral at bedtime  carvedilol 6.25 milliGRAM(s) Oral every 12 hours  chlorhexidine 2% Cloths 1 Application(s) Topical <User Schedule>  cloNIDine 0.1 milliGRAM(s) Oral two times a day  heparin   Injectable 5000 Unit(s) SubCutaneous every 8 hours  hydrALAZINE 100 milliGRAM(s) Oral two times a day  isosorbide   mononitrate ER Tablet (IMDUR) 60 milliGRAM(s) Oral daily  levETIRAcetam 500 milliGRAM(s) Oral two times a day  pantoprazole    Tablet 40 milliGRAM(s) Oral before breakfast  tamsulosin 0.4 milliGRAM(s) Oral at bedtime  torsemide 20 milliGRAM(s) Oral daily    MEDICATIONS  (PRN):  acetaminophen     Tablet .. 650 milliGRAM(s) Oral every 6 hours PRN Temp greater or equal to 38C (100.4F), Mild Pain (1 - 3)  melatonin 3 milliGRAM(s) Oral at bedtime PRN Insomnia  ondansetron Injectable 4 milliGRAM(s) IV Push every 8 hours PRN Nausea and/or Vomiting    Vital Signs Last 24 Hrs  T(F): 97.2 (20 May 2025 11:18), Max: 98.6 (19 May 2025 18:25)  HR: 66 (20 May 2025 11:18) (64 - 97)  BP: 99/54 (20 May 2025 11:18) (96/54 - 197/114)  RR: 20 (20 May 2025 11:18) (17 - 20)  SpO2: 93% (20 May 2025 11:18) (91% - 100%)  I&O's Summary    19 May 2025 07:01  -  20 May 2025 07:00  --------------------------------------------------------  IN: 0 mL / OUT: 600 mL / NET: -600 mL        PHYSICAL EXAM:  General: awake no resp distress  Neck: supple   Lungs: CTA   Cardio: RRR, S1/S2, No murmur  Abdomen: Soft, Nontender, Nondistended; Bowel sounds present  Extremities: No calf tenderness, No pitting edema    LABS:                        7.6    6.07  )-----------( 107      ( 20 May 2025 01:00 )             24.0     05-20    136  |  98  |  71.9  ----------------------------<  86  3.9   |  22.0  |  5.34    Ca    8.0      20 May 2025 13:06  Phos  4.4     05-20  Mg     1.6     05-20    TPro  7.1  /  Alb  4.4  /  TBili  1.3  /  DBili  x   /  AST  21  /  ALT  17  /  AlkPhos  95  05-19          PT/INR - ( 19 May 2025 12:05 )   PT: 15.2 sec;   INR: 1.31 ratio         PTT - ( 19 May 2025 12:05 )  PTT:34.0 sec              12:05 - VBG - pH: 7.360 | pCO2: 42    | pO2: 100   | Lactate: 0.70     ABG - ( 20 May 2025 04:20 )  pH, Arterial: 7.430 pH, Blood: x     /  pCO2: 39    /  pO2: 60    / HCO3: 26    / Base Excess: 1.6   /  SaO2: 94.8                        Urinalysis Basic - ( 20 May 2025 13:06 )    Color: x / Appearance: x / SG: x / pH: x  Gluc: 86 mg/dL / Ketone: x  / Bili: x / Urobili: x   Blood: x / Protein: x / Nitrite: x   Leuk Esterase: x / RBC: x / WBC x   Sq Epi: x / Non Sq Epi: x / Bacteria: x          RADIOLOGY & ADDITIONAL TESTS:    < from: TTE Limited W or WO Ultrasound Enhancing Agent (05.19.25 @ 17:14) >  CONCLUSIONS:      1. Septal motion is abnormal consistent with previous cardiac surgery. Left ventricular systolic function is moderately to severely decreased with an ejection fraction visually estimated at 35 to 40 %.   2. Moderate to severe left ventricular hypertrophy.   3. Normal right ventricular cavity size, with normal wall thickness, and normal right ventricular systolic function.   4. A bioprosthetic valve replacement valve replacement is present in the aortic position The prosthetic valve is well seated with normal function.   5. Bioprosthetic valve present. in the mitral position. Well seated prosthetic mitral valve with normal function. There is trace intravalvular mitral regurgitation.   6. Estimated pulmonary artery systolic pressure is 83 mmHg, consistent with severe pulmonary hypertension.   7. The inferior vena cava is dilated measuring 2.53 cm in diameter, (dilated >2.1cm) with abnormal inspiratory collapse (abnormal <50%) consistent with elevated right atrial pressure (~15, range 10-20mmHg).   8. No pericardial effusion seen.      < end of copied text >

## 2025-05-20 NOTE — CONSULT NOTE ADULT - ASSESSMENT
63yo M complex medical history admitted for acute respiratory failure secondary to CHF. Consulted as patient known to Dr. Crawford and missing outpatient appointment. LUE AVG functioning well, but requires multiple sticks often. No need for intervention at this time.     - Continue to use LUE AVG for HD as tolerated.   - May follow up outpatient after discharge  - Vascular surgery signing off

## 2025-05-20 NOTE — CONSULT NOTE ADULT - SUBJECTIVE AND OBJECTIVE BOX
Vascular Surgery Consult    Consulting attending: Dr. Crawford    Vascular surgery consulted as patient was admitted last night and had a scheduled appointment outpatient with Yvette Rivero today. HPI as below, admitted for acute respiratory failure secondary to fluid overload, HFrEF. Patient feels better today, breathing without issue. Patient has been receiving dialysis via his LUE AVG, which is functional. However, does report that multiples sticks have been required to access for HD which includes yesterday with inpatient HD nurses. Does have a nurse he sees frequently for HD that knows how to access his fistula graft well.       HPI:  65 yo M with PMH with hx of ESRD on HD, A. fib s/p ablation, LAAO device, CAD s/p CABG x2, bioprosthetic AVR/MR repair, MV endocarditis 2023, type A aortic dissection s/p repair w/ ischemic bowel s/p resection, hx substance abuse, presenting with shortness of breath. Patient reports that these symptoms started yesterday and today he went to a scheduled appointment with his cardiologist, who subsequently referred him to the hospital due to his breathing difficulties. Patient reports dietary and medication non-discretion this past week. Was placed on oxygen. Admitted to medicine for further management.  (19 May 2025 17:09)        PAST MEDICAL HISTORY:  CHF (congestive heart failure)    CVA (cerebral vascular accident)    Chronic kidney disease    Seizures    HTN (hypertension)    Hyperlipemia    COVID-19    Atrial fibrillation    ESRD on dialysis    Former smoker    History of cocaine use    H/O aortic dissection    H/O aortic valve insufficiency    Mitral regurgitation    GIB (gastrointestinal bleeding)    CAD (coronary artery disease)    Chronic atrial fibrillation    Aneurysm of artery of upper extremity    Anemia of chronic disease    End stage renal disease    Myocardial infarction    COVID-19 vaccine series completed    Port-A-Cath in place          PAST SURGICAL HISTORY:  Aorta disorder    H/O colectomy    Status post double vessel coronary artery bypass    S/P AVR (aortic valve replacement)    S/P MVR (mitral valve replacement)    H/O aortic dissection    AV fistula    History of renal transplant    Presence of Watchman left atrial appendage closure device          MEDICATIONS:  acetaminophen     Tablet .. 650 milliGRAM(s) Oral every 6 hours PRN  amLODIPine   Tablet 10 milliGRAM(s) Oral daily  aspirin enteric coated 81 milliGRAM(s) Oral daily  atorvastatin 40 milliGRAM(s) Oral at bedtime  carvedilol 6.25 milliGRAM(s) Oral every 12 hours  chlorhexidine 2% Cloths 1 Application(s) Topical <User Schedule>  cloNIDine 0.1 milliGRAM(s) Oral two times a day  heparin   Injectable 5000 Unit(s) SubCutaneous every 8 hours  hydrALAZINE 100 milliGRAM(s) Oral two times a day  isosorbide   mononitrate ER Tablet (IMDUR) 60 milliGRAM(s) Oral daily  levETIRAcetam 500 milliGRAM(s) Oral two times a day  melatonin 3 milliGRAM(s) Oral at bedtime PRN  ondansetron Injectable 4 milliGRAM(s) IV Push every 8 hours PRN  pantoprazole    Tablet 40 milliGRAM(s) Oral before breakfast  tamsulosin 0.4 milliGRAM(s) Oral at bedtime  torsemide 20 milliGRAM(s) Oral daily        ALLERGIES:  penicillin (Other)        VITALS & I/Os:  Vital Signs Last 24 Hrs  T(C): 36.2 (20 May 2025 11:18), Max: 37 (19 May 2025 18:25)  T(F): 97.2 (20 May 2025 11:18), Max: 98.6 (19 May 2025 18:25)  HR: 66 (20 May 2025 11:18) (64 - 97)  BP: 99/54 (20 May 2025 11:18) (96/54 - 197/114)  BP(mean): --  RR: 20 (20 May 2025 11:18) (17 - 20)  SpO2: 93% (20 May 2025 11:18) (91% - 100%)    Parameters below as of 20 May 2025 07:56  Patient On (Oxygen Delivery Method): nasal cannula  O2 Flow (L/min): 3      I&O's Summary    19 May 2025 07:01  -  20 May 2025 07:00  --------------------------------------------------------  IN: 0 mL / OUT: 600 mL / NET: -600 mL          PHYSICAL EXAM:  General: No acute distress, laying in bed, responding appropriately  Respiratory: Nonlabored, no conversational dyspnea  Cardiovascular: Regular rate  Abdominal: Soft, nondistended, nontender.  Extremities: Warm, well perfused. LUE with access site dressings intact. Palpable thrill overtop. No swelling or ecchymosis.   Skin: R Chest wall removed permacath site well-healed.         LABS:                        7.6    6.07  )-----------( 107      ( 20 May 2025 01:00 )             24.0     05-20    136  |  98  |  71.9[H]  ----------------------------<  86  3.9   |  22.0  |  5.34[H]    Ca    8.0[L]      20 May 2025 13:06  Phos  4.4     05-20  Mg     1.6     05-20    TPro  7.1  /  Alb  4.4  /  TBili  1.3  /  DBili  x   /  AST  21  /  ALT  17  /  AlkPhos  95  05-19    Lactate:    PT/INR - ( 19 May 2025 12:05 )   PT: 15.2 sec;   INR: 1.31 ratio         PTT - ( 19 May 2025 12:05 )  PTT:34.0 sec  ABG - ( 20 May 2025 04:20 )  pH, Arterial: 7.430 pH, Blood: x     /  pCO2: 39    /  pO2: 60    / HCO3: 26    / Base Excess: 1.6   /  SaO2: 94.8                    Urinalysis Basic - ( 20 May 2025 13:06 )    Color: x / Appearance: x / SG: x / pH: x  Gluc: 86 mg/dL / Ketone: x  / Bili: x / Urobili: x   Blood: x / Protein: x / Nitrite: x   Leuk Esterase: x / RBC: x / WBC x   Sq Epi: x / Non Sq Epi: x / Bacteria: x          IMAGING:

## 2025-05-21 LAB
HCT VFR BLD CALC: 29.5 % — LOW (ref 39–50)
HGB BLD-MCNC: 9.5 G/DL — LOW (ref 13–17)
MCHC RBC-ENTMCNC: 31.5 PG — SIGNIFICANT CHANGE UP (ref 27–34)
MCHC RBC-ENTMCNC: 32.2 G/DL — SIGNIFICANT CHANGE UP (ref 32–36)
MCV RBC AUTO: 97.7 FL — SIGNIFICANT CHANGE UP (ref 80–100)
NRBC # BLD AUTO: 0 K/UL — SIGNIFICANT CHANGE UP (ref 0–0)
NRBC # FLD: 0 K/UL — SIGNIFICANT CHANGE UP (ref 0–0)
NRBC BLD AUTO-RTO: 0 /100 WBCS — SIGNIFICANT CHANGE UP (ref 0–0)
PLATELET # BLD AUTO: 119 K/UL — LOW (ref 150–400)
PMV BLD: 11.1 FL — SIGNIFICANT CHANGE UP (ref 7–13)
RBC # BLD: 3.02 M/UL — LOW (ref 4.2–5.8)
RBC # FLD: 17.6 % — HIGH (ref 10.3–14.5)
WBC # BLD: 5.05 K/UL — SIGNIFICANT CHANGE UP (ref 3.8–10.5)
WBC # FLD AUTO: 5.05 K/UL — SIGNIFICANT CHANGE UP (ref 3.8–10.5)

## 2025-05-21 PROCEDURE — 99232 SBSQ HOSP IP/OBS MODERATE 35: CPT

## 2025-05-21 PROCEDURE — 99233 SBSQ HOSP IP/OBS HIGH 50: CPT

## 2025-05-21 RX ORDER — EPOETIN ALFA 10000 [IU]/ML
10000 SOLUTION INTRAVENOUS; SUBCUTANEOUS
Refills: 0 | Status: DISCONTINUED | OUTPATIENT
Start: 2025-05-21 | End: 2025-05-23

## 2025-05-21 RX ADMIN — Medication 81 MILLIGRAM(S): at 10:05

## 2025-05-21 RX ADMIN — Medication 0.1 MILLIGRAM(S): at 18:05

## 2025-05-21 RX ADMIN — HEPARIN SODIUM 5000 UNIT(S): 1000 INJECTION INTRAVENOUS; SUBCUTANEOUS at 21:17

## 2025-05-21 RX ADMIN — Medication 20 MILLIGRAM(S): at 05:40

## 2025-05-21 RX ADMIN — Medication 3 MILLIGRAM(S): at 22:21

## 2025-05-21 RX ADMIN — Medication 0.1 MILLIGRAM(S): at 05:40

## 2025-05-21 RX ADMIN — CARVEDILOL 6.25 MILLIGRAM(S): 3.12 TABLET, FILM COATED ORAL at 18:04

## 2025-05-21 RX ADMIN — LEVETIRACETAM 500 MILLIGRAM(S): 10 INJECTION, SOLUTION INTRAVENOUS at 18:04

## 2025-05-21 RX ADMIN — Medication 1 APPLICATION(S): at 05:41

## 2025-05-21 RX ADMIN — HEPARIN SODIUM 5000 UNIT(S): 1000 INJECTION INTRAVENOUS; SUBCUTANEOUS at 05:29

## 2025-05-21 RX ADMIN — LEVETIRACETAM 500 MILLIGRAM(S): 10 INJECTION, SOLUTION INTRAVENOUS at 05:30

## 2025-05-21 RX ADMIN — TAMSULOSIN HYDROCHLORIDE 0.4 MILLIGRAM(S): 0.4 CAPSULE ORAL at 21:17

## 2025-05-21 RX ADMIN — CARVEDILOL 6.25 MILLIGRAM(S): 3.12 TABLET, FILM COATED ORAL at 05:40

## 2025-05-21 RX ADMIN — ATORVASTATIN CALCIUM 40 MILLIGRAM(S): 80 TABLET, FILM COATED ORAL at 21:17

## 2025-05-21 RX ADMIN — Medication 100 MILLIGRAM(S): at 18:05

## 2025-05-21 RX ADMIN — EPOETIN ALFA 10000 UNIT(S): 10000 SOLUTION INTRAVENOUS; SUBCUTANEOUS at 14:31

## 2025-05-21 RX ADMIN — Medication 40 MILLIGRAM(S): at 05:30

## 2025-05-21 NOTE — PROGRESS NOTE ADULT - ASSESSMENT
63 yo M with PMH with hx of ESRD on HD, A. fib s/p ablation, LAAO device, CAD s/p CABG x2, bioprosthetic AVR/MR repair, MV endocarditis 2023, type A aortic dissection s/p repair w/ ischemic bowel s/p resection, hx substance abuse, presenting with shortness of breath. Patient reports that these symptoms started yesterday and today he went to a scheduled appointment with his cardiologist, who subsequently referred him to the hospital due to his breathing difficulties. Patient reports dietary and medication non-discretion this past week. Was placed on oxygen. Admitted to medicine for further management.       1-Acute hypoxic respiratory failure secondary to fluid overload HFrEF   cont HD for fluid removal cardiology is following  for LHC in 24 hours probably   TTE with new CM EF 35-40%   cont diuretuics , home meds coreg .  weight daily   strict intake output   cont oxygen as needed     2- Anemia of cronic kidney disease   will give 1 unot PRBC keep Hb over 8   blood tx with HD today   repeat hb in am     3- ESRD on HD  cont HD per nephrology   and weekly schedule     4- HTN   controlled   hold BP meds BP is soft   adjust meds to avoid hypotension   High on admission probably noncompliance      5- h/o CAD, CABG  C/W ASA  c/W  imdur, Coreg,, Torsemide, hydralazine    6-Seizure  C/w Keppra  stable    DC in 2-3 days pending  cardiac work up     DVT prophylaxis: Heparin sq

## 2025-05-21 NOTE — PROGRESS NOTE ADULT - SUBJECTIVE AND OBJECTIVE BOX
Patient is a 64y old  Male who presents with a chief complaint of Acute hypoxic respiratory failure (21 May 2025 09:26)      Patient seen and examined at bedside. No overnight events reported.     ALLERGIES:  penicillin (Other)    MEDICATIONS  (STANDING):  amLODIPine   Tablet 5 milliGRAM(s) Oral daily  aspirin enteric coated 81 milliGRAM(s) Oral daily  atorvastatin 40 milliGRAM(s) Oral at bedtime  carvedilol 6.25 milliGRAM(s) Oral every 12 hours  chlorhexidine 2% Cloths 1 Application(s) Topical <User Schedule>  cloNIDine 0.1 milliGRAM(s) Oral two times a day  epoetin yolanda-epbx (RETACRIT) Injectable 65025 Unit(s) IV Push <User Schedule>  heparin   Injectable 5000 Unit(s) SubCutaneous every 8 hours  hydrALAZINE 100 milliGRAM(s) Oral two times a day  isosorbide   mononitrate ER Tablet (IMDUR) 60 milliGRAM(s) Oral daily  levETIRAcetam 500 milliGRAM(s) Oral two times a day  pantoprazole    Tablet 40 milliGRAM(s) Oral before breakfast  tamsulosin 0.4 milliGRAM(s) Oral at bedtime  torsemide 20 milliGRAM(s) Oral daily    MEDICATIONS  (PRN):  acetaminophen     Tablet .. 650 milliGRAM(s) Oral every 6 hours PRN Temp greater or equal to 38C (100.4F), Mild Pain (1 - 3)  melatonin 3 milliGRAM(s) Oral at bedtime PRN Insomnia  ondansetron Injectable 4 milliGRAM(s) IV Push every 8 hours PRN Nausea and/or Vomiting    Vital Signs Last 24 Hrs  T(F): 97.6 (21 May 2025 07:43), Max: 97.9 (20 May 2025 19:42)  HR: 60 (21 May 2025 07:43) (60 - 67)  BP: 110/67 (21 May 2025 07:43) (99/54 - 127/70)  RR: 18 (21 May 2025 07:43) (18 - 20)  SpO2: 96% (21 May 2025 07:43) (91% - 96%)  I&O's Summary      PHYSICAL EXAM:  General:   ENT:   Neck:   Lungs:   Cardio: RRR, S1/S2, No murmur  Abdomen: Soft, Nontender, Nondistended; Bowel sounds present  Extremities: No calf tenderness, No pitting edema    LABS:                        7.6    6.07  )-----------( 107      ( 20 May 2025 01:00 )             24.0     05-20    136  |  98  |  71.9  ----------------------------<  86  3.9   |  22.0  |  5.34    Ca    8.0      20 May 2025 13:06  Phos  4.4     05-20  Mg     1.6     05-20    TPro  7.1  /  Alb  4.4  /  TBili  1.3  /  DBili  x   /  AST  21  /  ALT  17  /  AlkPhos  95  05-19          PT/INR - ( 19 May 2025 12:05 )   PT: 15.2 sec;   INR: 1.31 ratio         PTT - ( 19 May 2025 12:05 )  PTT:34.0 sec              12:05 - VBG - pH: 7.360 | pCO2: 42    | pO2: 100   | Lactate: 0.70     ABG - ( 20 May 2025 04:20 )  pH, Arterial: 7.430 pH, Blood: x     /  pCO2: 39    /  pO2: 60    / HCO3: 26    / Base Excess: 1.6   /  SaO2: 94.8                        Urinalysis Basic - ( 20 May 2025 13:06 )    Color: x / Appearance: x / SG: x / pH: x  Gluc: 86 mg/dL / Ketone: x  / Bili: x / Urobili: x   Blood: x / Protein: x / Nitrite: x   Leuk Esterase: x / RBC: x / WBC x   Sq Epi: x / Non Sq Epi: x / Bacteria: x          RADIOLOGY & ADDITIONAL TESTS:

## 2025-05-21 NOTE — DIETITIAN INITIAL EVALUATION ADULT - NS FNS DIET ORDER
Diet, Renal Restrictions:   For patients receiving Renal Replacement - No Protein Restr, No Conc K, No Conc Phos, Low Sodium  DASH/TLC {Sodium & Cholesterol Restricted} (DASH) (05-19-25 @ 16:33) [Active]

## 2025-05-21 NOTE — PROGRESS NOTE ADULT - NS ATTEND AMEND GEN_ALL_CORE FT
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 Seen by out patient cardiology team today, sent to hospital for work up elevated BP, dyspnea. Pt reports he was in usual state of health, went to car show yesterday and was feeling sob while walking which is new for him. At this time pt endorsing some dyspnea, and nausea. Pt states he missed dialysis last week, but had two sessions back Pt went to HD yesterday. TTE reveals LVEF 35-40%, mod to severe LVH, bioprosthetic aortic and mitral valves, severe pulm HTN PASP 83 mmHg, IVC dilation with abnormal inspiratory collapse.      Acute systolic heart failure.   - Pt still with dyspnea, denies orthopnea  - no edema on exam today   - TTE with newly reduced EF 35-40%, severe pulm HTN  - CT Chest- Diffuse indistinct groundglass opacities and interlobular septal thickening, likely due to interstitial lung edema. Small bibasilar pleural effusions, relatively unchanged.  - Pro BNP 50K  - ? missed dialysis last week, states had two days in a row to make up for missed session, can not recall days  - Resumed HD yesterday. receiving extra HD sessions, unable to tolerate fluid removal yesterday  - cont home medications hydralazine, imdur, coreg, torsemide   - Recommend LHC/RHC when stable. Not yet ready today, pending 1 unit PRBCs for anemia (7.6/24)  - will re-eval in AM     CAD (coronary artery disease).   - h/o CABG , AVR, MVR 2020  - h/o MV endocarditis 2023  - s/p leadless PPM for bradycardia  - Troponin 117->113, history of elevated troponin, likely demand in setting of ESRD.  - denies chest pain  - cont asa/statin  - Plan for LHC/RHC when stable. Reassess in AM.    Chronic atrial fibrillation.   - s/p watchman procedure  - not on AC  - cont asa  - tele monitoring.

## 2025-05-21 NOTE — DIETITIAN INITIAL EVALUATION ADULT - ORAL INTAKE PTA/DIET HISTORY
Pt provided limited information; as he is just waking up. Pt reports eating well PTA; is unsure if he has any recent weight changes. Pt is currently NPO for cardiac cath noted, declining diet education at this time; however agreeable to literature (which was left at bedside).

## 2025-05-21 NOTE — DIETITIAN INITIAL EVALUATION ADULT - PERTINENT LABORATORY DATA
05-20    136  |  98  |  71.9[H]  ----------------------------<  86  3.9   |  22.0  |  5.34[H]    Ca    8.0[L]      20 May 2025 13:06  Phos  4.4     05-20  Mg     1.6     05-20    TPro  7.1  /  Alb  4.4  /  TBili  1.3  /  DBili  x   /  AST  21  /  ALT  17  /  AlkPhos  95  05-19  A1C with Estimated Average Glucose Result: 4.2 % (01-08-25 @ 07:17)  A1C with Estimated Average Glucose Result: 4.2 % (12-02-24 @ 04:38)  A1C with Estimated Average Glucose Result: 4.7 % (08-13-24 @ 10:16)

## 2025-05-21 NOTE — DIETITIAN INITIAL EVALUATION ADULT - PERTINENT MEDS FT
MEDICATIONS  (STANDING):  levETIRAcetam 500 milliGRAM(s) Oral two times a day  pantoprazole    Tablet 40 milliGRAM(s) Oral before breakfast  tamsulosin 0.4 milliGRAM(s) Oral at bedtime  torsemide 20 milliGRAM(s) Oral daily    MEDICATIONS  (PRN):  ondansetron Injectable 4 milliGRAM(s) IV Push every 8 hours PRN Nausea and/or Vomiting

## 2025-05-21 NOTE — PROGRESS NOTE ADULT - SUBJECTIVE AND OBJECTIVE BOX
[facility]                                                         CARDIOLOGY AT [facility]                                                                  [address], [place] [address]                                                         Telephone: [phone]. Fax:[phone]                                                                             PROGRESS NOTE    Reason for follow up: HFrEF, pHTN  Update: improved work of breathing, remains on NC, defer LHC today as patient anemic. Plan for 1 unit PRBCs Plan for tomorrow for LHC.       Review of symptoms:   Cardiac:  No chest pain. No dyspnea. No palpitations.  Respiratory: no cough. No dyspnea  Gastrointestinal: No diarrhea. No abdominal pain. No bleeding.   Neuro: No focal neuro complaints.    Vitals:  T(C): 36.4 (05-21-25 @ 07:43), Max: 36.6 (05-20-25 @ 19:42)  HR: 60 (05-21-25 @ 07:43) (60 - 67)  BP: 110/67 (05-21-25 @ 07:43) (99/54 - 127/70)  RR: 18 (05-21-25 @ 07:43) (18 - 20)  SpO2: 96% (05-21-25 @ 07:43) (91% - 96%)  Wt(kg): --  I&O's Summary    Weight (kg): 63.5 (05-19 @ 10:53)    PHYSICAL EXAM:  Appearance: Comfortable. No acute distress  HEENT:  Atraumatic. Normocephalic.  Normal oral mucosa  Neurologic: A & O x 3, no gross focal deficits.  Cardiovascular: RRR S1 S2, No murmur, no rubs/gallops. No JVD  Respiratory: Lungs clear to auscultation, unlabored   Gastrointestinal:  Soft, Non-tender, + BS  Lower Extremities: 2+ Peripheral Pulses, No clubbing, cyanosis, or edema  Psychiatry: Patient is calm. No agitation.   Skin: warm and dry.    CURRENT CARDIAC MEDICATIONS:  amLODIPine   Tablet 5 milliGRAM(s) Oral daily  carvedilol 6.25 milliGRAM(s) Oral every 12 hours  cloNIDine 0.1 milliGRAM(s) Oral two times a day  hydrALAZINE 100 milliGRAM(s) Oral two times a day  isosorbide   mononitrate ER Tablet (IMDUR) 60 milliGRAM(s) Oral daily  torsemide 20 milliGRAM(s) Oral daily      CURRENT OTHER MEDICATIONS:  acetaminophen     Tablet .. 650 milliGRAM(s) Oral every 6 hours PRN Temp greater or equal to 38C (100.4F), Mild Pain (1 - 3)  levETIRAcetam 500 milliGRAM(s) Oral two times a day  melatonin 3 milliGRAM(s) Oral at bedtime PRN Insomnia  ondansetron Injectable 4 milliGRAM(s) IV Push every 8 hours PRN Nausea and/or Vomiting  pantoprazole    Tablet 40 milliGRAM(s) Oral before breakfast  atorvastatin 40 milliGRAM(s) Oral at bedtime  aspirin enteric coated 81 milliGRAM(s) Oral daily  chlorhexidine 2% Cloths 1 Application(s) Topical <User Schedule>  epoetin yolanda-epbx (RETACRIT) Injectable 69772 Unit(s) IV Push <User Schedule>  heparin   Injectable 5000 Unit(s) SubCutaneous every 8 hours  tamsulosin 0.4 milliGRAM(s) Oral at bedtime      LABS:	 	                            7.6    6.07  )-----------( 107      ( 20 May 2025 01:00 )             24.0     05-20    136  |  98  |  71.9[H]  ----------------------------<  86  3.9   |  22.0  |  5.34[H]    Ca    8.0[L]      20 May 2025 13:06  Phos  4.4     05-20  Mg     1.6     05-20    TPro  7.1  /  Alb  4.4  /  TBili  1.3  /  DBili  x   /  AST  21  /  ALT  17  /  AlkPhos  95  05-19    PT/INR/PTT ( 19 May 2025 12:05 )                       :                       :      15.2         :       34.0                  .        .                   .              .           .       1.31        .                                       Lipid Profile:   HgA1c:   TSH:     TELEMETRY: A Paced  ECG:    DIAGNOSTIC TESTING:  [ ] Echocardiogram: < from: TTE Limited W or WO Ultrasound Enhancing Agent (05.19.25 @ 17:14) >  TRANSTHORACIC ECHOCARDIOGRAM REPORT  ________________________________________________________________________________                                      _______       Pt. Name:       JONATHAN GIBSON Study Date:    5/19/2025  MRN:[id number]            YOB: 1960  Accession #:    [id number]             Age:           64 years  Account#:       [id number]            Gender:        M  Heart Rate:                           Height:        65.00 in (165.10 cm)  Rhythm:                             Weight:        139.00 lb (63.05 kg)  Blood Pressure: 174/103 mmHg          BSA/BMI:       1.69 m² / 23.13 kg/m²  ________________________________________________________________________________________  Referring Physician:    [id number] Dashawn Duncan  Interpreting Physician: Ryan Giang MD  Primary Sonographer:    Colt Sanabria    CPT:              LIMITED SPECTRAL - 10638.m;ECHO TTE W/O CON F/U LTD - 26843.m  Indication(s):    Cardiomyopathy, unspecified - I42.9  Procedure:        Limited transthoracic echocardiogram. Spectral Doppler                    performed.  Ordering          ER  Location:  Admission Status: ED  Study             Image quality for this study is fair.  Information:    _______________________________________________________________________________________     CONCLUSIONS:      1. Septal motion is abnormal consistent with previous cardiac surgery. Left ventricular systolic function is moderately to severely decreased with an ejection fraction visually estimated at 35 to 40 %.   2. Moderate to severe left ventricular hypertrophy.   3. Normal right ventricular cavity size, with normal wall thickness, and normal right ventricular systolic function.   4. A bioprosthetic valve replacement valve replacement is present in the aortic position The prosthetic valve is well seated with normal function.   5. Bioprosthetic valve present. in the mitral position. Well seated prosthetic mitral valve with normal function. There is trace intravalvular mitral regurgitation.   6. Estimated pulmonary artery systolic pressure is 83 mmHg, consistent with severe pulmonary hypertension.   7. The inferior vena cava is dilated measuring 2.53 cm in diameter, (dilated >2.1cm) with abnormal inspiratory collapse (abnormal <50%) consistent with elevated right atrial pressure (~15, range 10-20mmHg).   8. No pericardial effusion seen.    < end of copied text >    [ ]  Catheterization:  [ ] Stress Test:    OTHER:

## 2025-05-21 NOTE — DIETITIAN INITIAL EVALUATION ADULT - OTHER INFO
Pt is a 65 yo M with PMH with hx of ESRD on HD, A. fib s/p ablation, LAAO device, CAD s/p CABG x2, bioprosthetic AVR/MR repair, MV endocarditis 2023, type A aortic dissection s/p repair w/ ischemic bowel s/p resection, hx substance abuse, presenting with shortness of breath. Pt went to a scheduled appointment with his cardiologist, who subsequently referred him to the hospital due to his breathing difficulties.   Pt admitted with Acute hypoxic respiratory failure secondary to fluid overload from CHF

## 2025-05-21 NOTE — PROGRESS NOTE ADULT - PROBLEM SELECTOR PLAN 1
.  - Pt still with dyspnea, denies orthopnea  - no edema on exam today   - TTE with newly reduced EF 35-40%, severe pulm HTN  - CT Chest- Diffuse indistinct groundglass opacities and interlobular septal thickening, likely due to interstitial lung edema. Small bibasilar pleural effusions, relatively unchanged.  - Pro BNP 50K  - ? missed dialysis last week, states had two days in a row to make up for missed session, can not recall days  - Resumed HD yesterday. receiving extra HD sessions, unable to tolerate fluid removal yesterday  - cont home medications hydralazine, imdur, coreg, torsemide   - Recommend LHC/RHC when stable. Not yet ready today, pending 1 unit PRBCs for anemia (7.6/24)  - will re-eval in AM

## 2025-05-21 NOTE — PROGRESS NOTE ADULT - SUBJECTIVE AND OBJECTIVE BOX
NEPHROLOGY INTERVAL HPI/OVERNIGHT EVENTS:  pt clinically stable overnight      MEDICATIONS  (STANDING):  amLODIPine   Tablet 5 milliGRAM(s) Oral daily  aspirin enteric coated 81 milliGRAM(s) Oral daily  atorvastatin 40 milliGRAM(s) Oral at bedtime  carvedilol 6.25 milliGRAM(s) Oral every 12 hours  chlorhexidine 2% Cloths 1 Application(s) Topical <User Schedule>  cloNIDine 0.1 milliGRAM(s) Oral two times a day  heparin   Injectable 5000 Unit(s) SubCutaneous every 8 hours  hydrALAZINE 100 milliGRAM(s) Oral two times a day  isosorbide   mononitrate ER Tablet (IMDUR) 60 milliGRAM(s) Oral daily  levETIRAcetam 500 milliGRAM(s) Oral two times a day  pantoprazole    Tablet 40 milliGRAM(s) Oral before breakfast  tamsulosin 0.4 milliGRAM(s) Oral at bedtime  torsemide 20 milliGRAM(s) Oral daily    MEDICATIONS  (PRN):  acetaminophen     Tablet .. 650 milliGRAM(s) Oral every 6 hours PRN Temp greater or equal to 38C (100.4F), Mild Pain (1 - 3)  melatonin 3 milliGRAM(s) Oral at bedtime PRN Insomnia  ondansetron Injectable 4 milliGRAM(s) IV Push every 8 hours PRN Nausea and/or Vomiting      Allergies    penicillin (Other)          Vital Signs Last 24 Hrs  T(C): 36.4 (21 May 2025 04:10), Max: 36.6 (20 May 2025 19:42)  T(F): 97.6 (21 May 2025 04:10), Max: 97.9 (20 May 2025 19:42)  HR: 65 (21 May 2025 04:10) (61 - 69)  BP: 127/70 (21 May 2025 04:10) (96/54 - 127/70)  BP(mean): --  RR: 20 (21 May 2025 04:10) (20 - 20)  SpO2: 91% (21 May 2025 04:10) (91% - 94%)    Parameters below as of 21 May 2025 04:10  Patient On (Oxygen Delivery Method): room air        PHYSICAL EXAM:  GENERAL: Fatigued  HEENT: No periorbital edema  NECK: Supple, No JVD  NERVOUS SYSTEM:  A/O x3  CHEST: Clear, diminished BS  HEART:  RRR, no rub  ABDOMEN: Soft, NT/ND BS+  EXTREMITIES: Tr dependent edema  SKIN: No rashes    LABS:                        7.6    6.07  )-----------( 107      ( 20 May 2025 01:00 )             24.0     05-20    136  |  98  |  71.9[H]  ----------------------------<  86  3.9   |  22.0  |  5.34[H]    Ca    8.0[L]      20 May 2025 13:06  Phos  4.4     05-20  Mg     1.6     05-20    TPro  7.1  /  Alb  4.4  /  TBili  1.3  /  DBili  x   /  AST  21  /  ALT  17  /  AlkPhos  95  05-19    PT/INR - ( 19 May 2025 12:05 )   PT: 15.2 sec;   INR: 1.31 ratio         PTT - ( 19 May 2025 12:05 )  PTT:34.0 sec  Urinalysis Basic - ( 20 May 2025 13:06 )    Color: x / Appearance: x / SG: x / pH: x  Gluc: 86 mg/dL / Ketone: x  / Bili: x / Urobili: x   Blood: x / Protein: x / Nitrite: x   Leuk Esterase: x / RBC: x / WBC x   Sq Epi: x / Non Sq Epi: x / Bacteria: x          RADIOLOGY & ADDITIONAL TESTS:  < from: CT Chest No Cont (05.19.25 @ 18:08) >    ACC: [id number] EXAM:  CT CHEST   ORDERED BY: GISELA MIRANDA     PROCEDURE DATE:  05/19/2025          INTERPRETATION:  CLINICAL INFORMATION: 64-year-old male is evaluated for   shortness of breath    TECHNIQUE: A volumetric CT acquisition of the chestwas obtained from the   thoracic inlet to the upper abdomen, without administration of   intravenous contrast. This CT scan was performed with one or more of the   following dose optimization: iterative reconstruction, automatic exposure   control, and/or manual adjustment of mAs and kVp according to the   patient's size. 3D MIP and volume-rendered images were created at the   scanner.    COMPARISON: The study was compared to CT chest dated 5/22/2023 and   2/16/2025    FINDINGS:  Lines and Tubes:None    Mediastinum: The thyroid and thoracic inlet are normal. There are   prominent axillary lymph nodes measuring up to 10 mm in left axilla.there   are multiple enlarged mediastinal lymph nodes with the largest on   measuring up to 2.2 cm in subcarinal region. There are enlarged   aortopulmonary lymph nodes measuring up to 1.5 cm.. There is   cardiomegaly. There is no pericardial effusion. A watchman device in left   atrial appendage is noted. Patient is status post aortic and mitral valve   replacement.An intimal flap in descending thoracic aorta is noted, in   keeping with known history of aortic dissection. At the level of right   pulmonary artery, descending thoracic aorta is dilated measuring up to   4.0 cm.The esophagus is unremarkable.    Lung/pleura: The trachea is patent. There is minimal secretion in right   main stem bronchus. Left lower lobe remains low in volume. There is   diffuse indistinct groundglass opacities in bilateral lungs there are   interlobular septal thickening most prominent in the lung bases and lung   apices. There is a 7 mm subpleural nodule in right lower lobe, unchanged.   There is a stable 5 mm solid nodule in left upper lobe (series 4, image   142). There are peribronchial wall thickening in the left lower lobe   There are small left greater than right pleural effusions common   relatively unchanged.    Pleura: There is no pleural effusion or pneumothorax.    Abdomen: There is no acute upper abdominal finding this limited   evaluation.    Bone and Soft Tissue: There is no evidence of osteosclerotic or   osteolytic lesions. There are multilevel degenerative changes of thoracic   spine , with disk space narrowing and osteophytosis. Patient status post   median sternotomy. The soft tissue isgrossly normal.    IMPRESSION:  Diffuse indistinct groundglass opacities and interlobular septal   thickening, likely due to interstitial lung edema. Small bibasilar   pleural effusions, relatively unchanged.  Pulmonary nodules measuring up to 7 mm, unchanged.  Mediastinal lymphadenopathy, of uncertain etiology.  Cardiomegaly.    < end of copied text >

## 2025-05-21 NOTE — PROGRESS NOTE ADULT - ASSESSMENT
ESRD: CHF  HTN  - HD today with UF as tolerates  - cont current antihypertensive regimen including Torsemide  - vascular noted--> outpatient f/u as AVF functional    Anemia: +CKD  - add REINALDO at HD  - check Fe stores  - may need PRBCs    RO: serum phos ok  - low phos diet  - monitor off binders for now

## 2025-05-21 NOTE — PROGRESS NOTE ADULT - ASSESSMENT
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 Seen by out patient cardiology team today, sent to hospital for work up elevated BP, dyspnea. Pt reports he was in usual state of health, went to car show yesterday and was feeling sob while walking which is new for him. At this time pt endorsing some dyspnea, and nausea. Pt states he missed dialysis last week, but had two sessions back Pt went to HD yesterday. TTE reveals LVEF 35-40%, mod to severe LVH, bioprosthetic aortic and mitral valves, severe pulm HTN PASP 83 mmHg, IVC dilation with abnormal inspiratory collapse.

## 2025-05-22 ENCOUNTER — APPOINTMENT (OUTPATIENT)
Dept: ELECTROPHYSIOLOGY | Facility: CLINIC | Age: 65
End: 2025-05-22

## 2025-05-22 LAB
FERRITIN SERPL-MCNC: 746 NG/ML — HIGH (ref 30–400)
HCT VFR BLD CALC: 30 % — LOW (ref 39–50)
HGB BLD-MCNC: 9.4 G/DL — LOW (ref 13–17)
IRON SATN MFR SERPL: 20 % — SIGNIFICANT CHANGE UP (ref 16–55)
IRON SATN MFR SERPL: 50 UG/DL — LOW (ref 59–158)
MCHC RBC-ENTMCNC: 30.9 PG — SIGNIFICANT CHANGE UP (ref 27–34)
MCHC RBC-ENTMCNC: 31.3 G/DL — LOW (ref 32–36)
MCV RBC AUTO: 98.7 FL — SIGNIFICANT CHANGE UP (ref 80–100)
NRBC # BLD AUTO: 0 K/UL — SIGNIFICANT CHANGE UP (ref 0–0)
NRBC # FLD: 0 K/UL — SIGNIFICANT CHANGE UP (ref 0–0)
NRBC BLD AUTO-RTO: 0 /100 WBCS — SIGNIFICANT CHANGE UP (ref 0–0)
PLATELET # BLD AUTO: 120 K/UL — LOW (ref 150–400)
PMV BLD: 10.9 FL — SIGNIFICANT CHANGE UP (ref 7–13)
RBC # BLD: 3.04 M/UL — LOW (ref 4.2–5.8)
RBC # FLD: 17.7 % — HIGH (ref 10.3–14.5)
TIBC SERPL-MCNC: 245 UG/DL — SIGNIFICANT CHANGE UP (ref 220–430)
TRANSFERRIN SERPL-MCNC: 171 MG/DL — LOW (ref 180–329)
WBC # BLD: 4.53 K/UL — SIGNIFICANT CHANGE UP (ref 3.8–10.5)
WBC # FLD AUTO: 4.53 K/UL — SIGNIFICANT CHANGE UP (ref 3.8–10.5)

## 2025-05-22 PROCEDURE — 99232 SBSQ HOSP IP/OBS MODERATE 35: CPT

## 2025-05-22 PROCEDURE — 93451 RIGHT HEART CATH: CPT | Mod: 26

## 2025-05-22 PROCEDURE — 99152 MOD SED SAME PHYS/QHP 5/>YRS: CPT

## 2025-05-22 PROCEDURE — 93567 NJX CAR CTH SPRVLV AORTGRPHY: CPT

## 2025-05-22 RX ORDER — SACUBITRIL AND VALSARTAN 49; 51 MG/1; MG/1
1 TABLET, FILM COATED ORAL
Refills: 0 | Status: DISCONTINUED | OUTPATIENT
Start: 2025-05-22 | End: 2025-05-23

## 2025-05-22 RX ORDER — MAGNESIUM SULFATE 500 MG/ML
2 SYRINGE (ML) INJECTION ONCE
Refills: 0 | Status: COMPLETED | OUTPATIENT
Start: 2025-05-22 | End: 2025-05-22

## 2025-05-22 RX ORDER — METOPROLOL SUCCINATE 50 MG/1
25 TABLET, EXTENDED RELEASE ORAL ONCE
Refills: 0 | Status: COMPLETED | OUTPATIENT
Start: 2025-05-23 | End: 2025-05-23

## 2025-05-22 RX ORDER — ALPRAZOLAM 0.5 MG
0.25 TABLET, EXTENDED RELEASE 24 HR ORAL ONCE
Refills: 0 | Status: DISCONTINUED | OUTPATIENT
Start: 2025-05-22 | End: 2025-05-22

## 2025-05-22 RX ORDER — CARVEDILOL 3.12 MG/1
12.5 TABLET, FILM COATED ORAL EVERY 12 HOURS
Refills: 0 | Status: DISCONTINUED | OUTPATIENT
Start: 2025-05-22 | End: 2025-05-23

## 2025-05-22 RX ADMIN — CARVEDILOL 12.5 MILLIGRAM(S): 3.12 TABLET, FILM COATED ORAL at 17:42

## 2025-05-22 RX ADMIN — Medication 25 GRAM(S): at 13:25

## 2025-05-22 RX ADMIN — Medication 40 MILLIGRAM(S): at 05:48

## 2025-05-22 RX ADMIN — Medication 20 MILLIGRAM(S): at 05:48

## 2025-05-22 RX ADMIN — AMLODIPINE BESYLATE 5 MILLIGRAM(S): 10 TABLET ORAL at 05:47

## 2025-05-22 RX ADMIN — Medication 650 MILLIGRAM(S): at 05:45

## 2025-05-22 RX ADMIN — CARVEDILOL 6.25 MILLIGRAM(S): 3.12 TABLET, FILM COATED ORAL at 05:48

## 2025-05-22 RX ADMIN — LEVETIRACETAM 500 MILLIGRAM(S): 10 INJECTION, SOLUTION INTRAVENOUS at 05:47

## 2025-05-22 RX ADMIN — HEPARIN SODIUM 5000 UNIT(S): 1000 INJECTION INTRAVENOUS; SUBCUTANEOUS at 21:19

## 2025-05-22 RX ADMIN — HEPARIN SODIUM 5000 UNIT(S): 1000 INJECTION INTRAVENOUS; SUBCUTANEOUS at 05:48

## 2025-05-22 RX ADMIN — LEVETIRACETAM 500 MILLIGRAM(S): 10 INJECTION, SOLUTION INTRAVENOUS at 17:42

## 2025-05-22 RX ADMIN — Medication 100 MILLIGRAM(S): at 17:42

## 2025-05-22 RX ADMIN — Medication 1 APPLICATION(S): at 05:49

## 2025-05-22 RX ADMIN — SACUBITRIL AND VALSARTAN 1 TABLET(S): 49; 51 TABLET, FILM COATED ORAL at 17:42

## 2025-05-22 RX ADMIN — Medication 0.25 MILLIGRAM(S): at 00:39

## 2025-05-22 RX ADMIN — ATORVASTATIN CALCIUM 40 MILLIGRAM(S): 80 TABLET, FILM COATED ORAL at 21:19

## 2025-05-22 RX ADMIN — Medication 0.1 MILLIGRAM(S): at 05:47

## 2025-05-22 RX ADMIN — Medication 81 MILLIGRAM(S): at 10:46

## 2025-05-22 RX ADMIN — TAMSULOSIN HYDROCHLORIDE 0.4 MILLIGRAM(S): 0.4 CAPSULE ORAL at 21:19

## 2025-05-22 NOTE — DISCHARGE NOTE NURSING/CASE MANAGEMENT/SOCIAL WORK - NSFLUVACAGEDISCH_IMM_ALL_CORE
Adult Humira Counseling:  I discussed with the patient the risks of adalimumab including but not limited to myelosuppression, immunosuppression, autoimmune hepatitis, demyelinating diseases, lymphoma, and serious infections.  The patient understands that monitoring is required including a PPD at baseline and must alert us or the primary physician if symptoms of infection or other concerning signs are noted.

## 2025-05-22 NOTE — DISCHARGE NOTE PROVIDER - NSDCCPCAREPLAN_GEN_ALL_CORE_FT
PRINCIPAL DISCHARGE DIAGNOSIS  Diagnosis: Acute hypoxic respiratory failure  Assessment and Plan of Treatment: Now resolved and stable on RA   Continue medication regiment for HF as ordered   continue HD outpatient as per nephrology   HD today 6/3      SECONDARY DISCHARGE DIAGNOSES  Diagnosis: S/P evacuation of hematoma  Assessment and Plan of Treatment: R. thigh hematoma s/p procedures with vascular team with DEDRICK drain remaining in place   plan for further follow up in outpatient vascular clinic for further management of drain / further intervention if needed   continue ASA   Refused home care for drain    Diagnosis: HTN (hypertension)  Assessment and Plan of Treatment: Stable at this time with decreased dosage of standing HF medication   continue medications as prescribed   continue to follow closely with cardiology / HF team    Diagnosis: Acute on chronic systolic congestive heart failure  Assessment and Plan of Treatment: Continue medication regiment as prescribed   Resume home torsemide and monitor BP at home    Diagnosis: CAD (coronary artery disease)  Assessment and Plan of Treatment: Continue medication regiment as ordered   as above close follow up with cardiology / HF team    Diagnosis: End stage renal disease  Assessment and Plan of Treatment: Continue outpatient HD   follow up with outpatient nephrology

## 2025-05-22 NOTE — PROGRESS NOTE ADULT - ASSESSMENT
ESRD: CHF r/o cardiac event  HTN  - HD tomorrow==> will eventually need to transition back to outpt TTS schedule  - cont current antihypertensive regimen including Torsemide  - awaiting cardiac cath    Anemia: +CKD; Tsat 20%  - added REINALDO at HD  - may need PRBCs    RO: serum phos ok  - low phos diet  - monitor off binders for now

## 2025-05-22 NOTE — DISCHARGE NOTE PROVIDER - CARE PROVIDERS DIRECT ADDRESSES
[email or web address]

## 2025-05-22 NOTE — PROGRESS NOTE ADULT - SUBJECTIVE AND OBJECTIVE BOX
[facility]                                                         CARDIOLOGY AT [facility]                                                                  [address] [facility], [address] [place] [address]                                                         Telephone: [phone]. Fax:[phone]                                                                             PROGRESS NOTE    Reason for follow up: HFrEF, pHTN  Update: improved work of breathing more euvolemic. Plan for LHC/RHC today      Review of symptoms:   Cardiac:  No chest pain. No dyspnea. No palpitations.  Respiratory: no cough. No dyspnea  Gastrointestinal: No diarrhea. No abdominal pain. No bleeding.   Neuro: No focal neuro complaints.    Vitals:  T(C): 36.5 (05-22-25 @ 07:36), Max: 36.8 (05-21-25 @ 23:28)  HR: 66 (05-22-25 @ 07:36) (65 - 69)  BP: 132/59 (05-22-25 @ 07:36) (108/58 - 151/65)  RR: 18 (05-22-25 @ 07:36) (18 - 20)  SpO2: 94% (05-22-25 @ 07:36) (93% - 97%)  Wt(kg): --  I&O's Summary    21 May 2025 07:01  -  22 May 2025 07:00  --------------------------------------------------------  IN: 0 mL / OUT: 800 mL / NET: -800 mL      Weight (kg): 63.5 (05-19 @ 10:53)    PHYSICAL EXAM:  Appearance: Comfortable. No acute distress  HEENT:  Atraumatic. Normocephalic.  Normal oral mucosa  Neurologic: A & O x 3, no gross focal deficits.  Cardiovascular: PACED S1 S2, No murmur, no rubs/gallops. No JVD  Respiratory: Lungs clear to auscultation, unlabored   Gastrointestinal:  Soft, Non-tender, + BS  Lower Extremities: 2+ Peripheral Pulses, No clubbing, cyanosis, or edema  Psychiatry: Patient is calm. No agitation.   Skin: warm and dry.    CURRENT CARDIAC MEDICATIONS:  amLODIPine   Tablet 5 milliGRAM(s) Oral daily  carvedilol 6.25 milliGRAM(s) Oral every 12 hours  cloNIDine 0.1 milliGRAM(s) Oral two times a day  hydrALAZINE 100 milliGRAM(s) Oral two times a day  isosorbide   mononitrate ER Tablet (IMDUR) 60 milliGRAM(s) Oral daily  torsemide 20 milliGRAM(s) Oral daily      CURRENT OTHER MEDICATIONS:  acetaminophen     Tablet .. 650 milliGRAM(s) Oral every 6 hours PRN Temp greater or equal to 38C (100.4F), Mild Pain (1 - 3)  levETIRAcetam 500 milliGRAM(s) Oral two times a day  melatonin 3 milliGRAM(s) Oral at bedtime PRN Insomnia  ondansetron Injectable 4 milliGRAM(s) IV Push every 8 hours PRN Nausea and/or Vomiting  pantoprazole    Tablet 40 milliGRAM(s) Oral before breakfast  atorvastatin 40 milliGRAM(s) Oral at bedtime  aspirin enteric coated 81 milliGRAM(s) Oral daily  chlorhexidine 2% Cloths 1 Application(s) Topical <User Schedule>  epoetin yolanda-epbx (RETACRIT) Injectable 69589 Unit(s) IV Push <User Schedule>  heparin   Injectable 5000 Unit(s) SubCutaneous every 8 hours  tamsulosin 0.4 milliGRAM(s) Oral at bedtime      LABS:	 	                            9.4    4.53  )-----------( 120      ( 22 May 2025 04:00 )             30.0     05-20    136  |  98  |  71.9[H]  ----------------------------<  86  3.9   |  22.0  |  5.34[H]    Ca    8.0[L]      20 May 2025 13:06      PT/INR/PTT ( 19 May 2025 12:05 )                       :                       :      15.2         :       34.0                  .        .                   .              .           .       1.31        .                                       Lipid Profile:   HgA1c:   TSH:     TELEMETRY: A paced   ECG:    DIAGNOSTIC TESTING:  [ ] Echocardiogram:   < from: TTE Limited W or WO Ultrasound Enhancing Agent (05.19.25 @ 17:14) >    TRANSTHORACIC ECHOCARDIOGRAM REPORT  ________________________________________________________________________________                                      _______       Pt. Name:       JONATHAN GIBSON Study Date:    5/19/2025  MRN:[id number]            YOB: 1960  Accession #:    [id number]             Age:           64 years  Account#:       [id number]            Gender:        M  Heart Rate:                           Height:        65.00 in (165.10 cm)  Rhythm:                             Weight:        139.00 lb (63.05 kg)  Blood Pressure: 174/103 mmHg          BSA/BMI:       1.69 m² / 23.13 kg/m²  ________________________________________________________________________________________  Referring Physician:    [id number] Dashawn Duncan  Interpreting Physician: Ryan Giang MD  Primary Sonographer:    Colt Sanabria    CPT:              LIMITED SPECTRAL - 34177.m;ECHO TTE W/O CON F/U LTD - 78402.m  Indication(s):    Cardiomyopathy, unspecified - I42.9  Procedure:        Limited transthoracic echocardiogram. Spectral Doppler                    performed.  Ordering          ER  Location:  Admission Status: ED  Study             Image quality for this study is fair.  Information:    _______________________________________________________________________________________     CONCLUSIONS:      1. Septal motion is abnormal consistent with previous cardiac surgery. Left ventricular systolic function is moderately to severely decreased with an ejection fraction visually estimated at 35 to 40 %.   2. Moderate to severe left ventricular hypertrophy.   3. Normal right ventricular cavity size, with normal wall thickness, and normal right ventricular systolic function.   4. A bioprosthetic valve replacement valve replacement is present in the aortic position The prosthetic valve is well seated with normal function.   5. Bioprosthetic valve present. in the mitral position. Well seated prosthetic mitral valve with normal function. There is trace intravalvular mitral regurgitation.   6. Estimated pulmonary artery systolic pressure is 83 mmHg, consistent with severe pulmonary hypertension.   7. The inferior vena cava is dilated measuring 2.53 cm in diameter, (dilated >2.1cm) with abnormal inspiratory collapse (abnormal <50%) consistent with elevated right atrial pressure (~15, range 10-20mmHg).   8. No pericardial effusion seen.    < end of copied text >  [ ]  Catheterization:  [ ] Stress Test:    OTHER:

## 2025-05-22 NOTE — DISCHARGE NOTE PROVIDER - CARE PROVIDER_API CALL
Felice Frankel  Cardiovascular Disease  [address] [facility] [facility], [address]  [place] [address]  Phone: [phone]  Fax: [phone]  Follow Up Time: 1 week   Felice Frankel  Cardiovascular Disease  [address] [facility], [address]  [place] [address]  Phone: [phone]  Fax: [phone]  Follow Up Time: 1 week    Zoya Liriano  Vascular Surgery  [address] [place] [address]  Phone: [phone]  Fax: [phone]  Follow Up Time: 1-3 days    Ramos Garcia  Nephrology  [address] [place] [address]  Phone: [phone]  Fax: [phone]  Follow Up Time: 2 weeks    PCP,   Phone: (   )    -  Fax: (   )    -  Follow Up Time:     Santana Duncan  Adv Heart Fail Trns[facility] Cardio  [address] [place] [address] - [place] [address]  Phone: [phone]  Fax: [phone]  Follow Up Time: 2 weeks

## 2025-05-22 NOTE — DISCHARGE NOTE NURSING/CASE MANAGEMENT/SOCIAL WORK - NSDCCRNAME_GEN_ALL_CORE_FT
Patient will resume HD on Saturday May 24th at 5:00 am located at [facility] on  [address] [place] [address]. patient drives self to and from HD

## 2025-05-22 NOTE — CONSULT NOTE ADULT - ASSESSMENT
63 y/o M with significant PMH ESRD s/p renal transplant on HD, type A aortic dissection repair (2010), ischemic bowel resection, CAD s/p CABG x 2 (SVG-OM1  and SVG-RPDA), bioprosthetic AVR/MVR (2020 Dr. Butler), Afib s/p ablation, LAAO device, MV endocarditis (2023), remote history of substance abuse and former smoker who presented with c/o shortness of breath.    TTE this admission revealed newly reduced LV systolic function 35-40%. LHC was attempted but unable to pass through aortic root in view of prior type A dissection repair. RHC showed mildly elevated filling pressures and increased CO (of note, pt has AVF graft).      Cardiac Testing:  TTE 5/19/25: LVEF 35-40%, LVIDD 4.6cm, LVOT VTI 15.9cm, moderate to severe LVH, normal RV size/function, well seated normal functioning bioprosthetic aortic valve, well seated normal functioning bioprosthetic mitral valve with trace intravalvular regurgitation, PASP 83 mmHg (RAP 15 mmHg).     TTE 4/14/25: LVEF 65-70%, LVIDd 4.9cm, LVOT VTI 32.6cm, normal RV size/function, mild JESSICA, bioprosthetic aortic valve with no AS and trace intravalvular regurgitation, bioprosthetic mitral valve with trace intravalvular regurgitation (PG14.9 mmHg, MG 2.77 mmHg), mild to moderate TR, PASP 83 mmHg (RAP 15 mmHg). Aortic root dilated 4.32 cm. No pericardial effusion.     TTE 12/4/24: LVEF 50-55%, LVIDD 4.4cm, LVOT VTI 17cm, moderate LVH, RVE with reduced RV systolic function, bioprosthetic aortic valve w/ trace intravalvular regurgitation (PV 2.24 m/s, PG 20.1 mmHg, MG11.0 mmHg w/ LVOT/aortic valve VTI ratio 0.40. ALEC 1.50 cm² by the continuity equation), bioprosthetic mitral valve with normal function (PG 14.4 mmHg, MG 7.00 mmHg at a heart rate of 103 bpm), moderate TR, PASP 55 mmHg (RAP 15 mmHg). Presence of abdominal ascites incidentally noted. No pericardial effusion.    65 y/o M with significant PMH ESRD s/p renal transplant on HD Tu/Th/Sat, type A aortic dissection repair (2010), ischemic bowel resection, CAD s/p CABG x 2 (SVG-OM1  and SVG-RPDA), bioprosthetic AVR/MVR (2020 Dr. Butler), Afib s/p ablation (2021), LAAO device (8/2024), MV endocarditis (2023), remote history of substance abuse and former smoker who presented with c/o shortness of breath.    TTE this admission revealed newly reduced LV systolic function 35-40%. LHC was attempted but unable to pass through aortic root in view of prior type A dissection repair. RHC showed mildly elevated filling pressures and increased CO (of note, pt has AVF graft).      Cardiac Testing:  TTE 5/19/25: LVEF 35-40%, LVIDD 4.6cm, LVOT VTI 15.9cm, moderate to severe LVH, normal RV size/function, well seated normal functioning bioprosthetic aortic valve, well seated normal functioning bioprosthetic mitral valve with trace intravalvular regurgitation, PASP 83 mmHg (RAP 15 mmHg).     TTE 4/14/25: LVEF 65-70%, LVIDd 4.9cm, LVOT VTI 32.6cm, normal RV size/function, mild JESSICA, bioprosthetic aortic valve with no AS and trace intravalvular regurgitation, bioprosthetic mitral valve with trace intravalvular regurgitation (PG14.9 mmHg, MG 2.77 mmHg), mild to moderate TR, PASP 83 mmHg (RAP 15 mmHg). Aortic root dilated 4.32 cm. No pericardial effusion.     TTE 12/4/24: LVEF 50-55%, LVIDD 4.4cm, LVOT VTI 17cm, moderate LVH, RVE with reduced RV systolic function, bioprosthetic aortic valve w/ trace intravalvular regurgitation (PV 2.24 m/s, PG 20.1 mmHg, MG11.0 mmHg w/ LVOT/aortic valve VTI ratio 0.40. ALEC 1.50 cm² by the continuity equation), bioprosthetic mitral valve with normal function (PG 14.4 mmHg, MG 7.00 mmHg at a heart rate of 103 bpm), moderate TR, PASP 55 mmHg (RAP 15 mmHg). Presence of abdominal ascites incidentally noted. No pericardial effusion.

## 2025-05-22 NOTE — DISCHARGE NOTE NURSING/CASE MANAGEMENT/SOCIAL WORK - PATIENT PORTAL LINK FT
You can access the FollowMyHealth Patient Portal offered by [facility] by registering at the following website: [email or web address]. By joining [employer]’s FollowMyHealth portal, you will also be able to view your health information using other applications (apps) compatible with our system.

## 2025-05-22 NOTE — DISCHARGE NOTE PROVIDER - HOSPITAL COURSE
Mr. Cummins is a 65 yo M with a PMH significant for ESRD s/p renal transplant on HD Tu/Th/Sat, type A aortic dissection s/p repair (2010), ischemic bowel resection, CAD s/p CABG x 2 (SVG-OM1and SVG-RPDA), bioprosthetic AVR/MVR (2020 w/ Dr. Butler), AF s/p ablation (2021) and LAAO device (8/2024), MV endocarditis (2023), seizure disorder, BPH, remote hx of substance abuse and former smoker initially presented to [facility] c/o SOB and was admitted 5/19/25 to medicine for new acute CHF exacerbation.  TTE on 5/19/25 was significant for newly reduced LV EF 35-40%.  Pt underwent RHC/LHC on 5/22/25 via RFA.  RHC showed mildly elevated filling pressures and increased CO.  During LHC was unable to pass through aortic root due to prior type A dissection repair.  Hospital course complicated by pt developing Rt groin hematoma on 05/23 noted in AM.  Cardio/cath lab called.  Initially manual pressure applied and fem stop placed but hematoma reoccurred/expanded refractory to fem stop.  Vascular was consulted and stat CTA RLE was ordered which showed a massive hematoma and pt was urgently taken to OR for exploration/ evacuation of  RLE hematoma,  Pt received 1 unit PRBC pre OR but during OR pt went into hemorrhagic shock and MTP was initiated and pt was subsequently given another 4u pRBC, 2u FFP and 1u plt in OR.  Pt s/p open cut-down and evacuation of 1L of fresh blood, femoral artery behind profunda vein was ligated and transected, 3mm arteriotomy w/ active bleeding at common femoral artery bifurcation repaired with suture, bleeding stopped and Rt groin DEDRICK drain placed.  Given pt received significant amount of blood products pre/intra-op decision was made to leave pt intubated for resp failure 2/2 vol overload from CHF but did not require pressor support.  downgraded from micu 5/24. Vascular team and Intervention cardiology continued to follow patient with no further interventions planned, per Vascular team, patient will be discharged with DEDRICK drain in place in R. thigh hematoma and further care / imaging to be performed in outpatient vascular clinic. Discussed with HF team, patients HF medications adjusted due to mild hypotension noted, had been potentially planned for Cardiomems procedure however given hematoma in R. side / need for likely groin access for mems procedure, deferred at this time. Patients pain in hematoma controlled with PO regiment with oxycodone. Patient ambulatory at this time without assistance. Patient is currently medically stable for DC home with close outpatient vascular / cardiology / HF follow up. as well as continued outpatient HD.

## 2025-05-22 NOTE — DISCHARGE NOTE PROVIDER - NSDCFUSCHEDAPPT_GEN_ALL_CORE_FT
Felice Frankel  [facility] Physician [facility]  CARDIOLOGY [address]  Scheduled Appointment: 06/24/2025

## 2025-05-22 NOTE — PROGRESS NOTE ADULT - NS ATTEND AMEND GEN_ALL_CORE FT
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 Seen by out patient cardiology team today, sent to hospital for work up elevated BP, dyspnea. Pt reports he was in usual state of health, went to car show yesterday and was feeling sob while walking which is new for him. At this time pt endorsing some dyspnea, and nausea. Pt states he missed dialysis last week, but had two sessions back Pt went to HD yesterday. TTE reveals LVEF 35-40%, mod to severe LVH, bioprosthetic aortic and mitral valves, severe pulm HTN PASP 83 mmHg, IVC dilation with abnormal inspiratory collapse.       Acute systolic heart failure.   - no edema on exam today. improved work of breathing   - TTE with newly reduced EF 35-40%, severe pulm HTN  - CT Chest- Diffuse indistinct groundglass opacities and interlobular septal thickening  - Pro BNP 50K  - receiving extra HD sessions this visit   - cont home medications hydralazine, imdur, coreg, torsemide   - stable for LHC/RHC today, patient is euvolemic and H/H now 9.4/30.     HTN   - cont home regimen hydralazine, imdur, coreg, amlodipine  - reduce clonidine 0.1 mg BID given BP is now low s/p HD  - monitor BP.    CAD   - h/o CABG , AVR, MVR 2020  - h/o MV endocarditis 2023  - s/p leadless PPM for bradycardia  - Troponin 117->113, history of elevated troponin, likely demand in setting of ESRD.  - denies chest pain  - cont asa/statin  - LHC/RHC today.    Chronic atrial fibrillation.   - s/p watchman procedure  - not on AC  - A-paced on telemetry

## 2025-05-22 NOTE — PROGRESS NOTE ADULT - SUBJECTIVE AND OBJECTIVE BOX
Patient is a 64y old  Male who presents with a chief complaint of Acute hypoxic respiratory failure (22 May 2025 08:28)      Patient seen and examined at bedside  he is on room air . feeling well   denies any pain         ALLERGIES:  penicillin (Other)    MEDICATIONS  (STANDING):  amLODIPine   Tablet 5 milliGRAM(s) Oral daily  aspirin enteric coated 81 milliGRAM(s) Oral daily  atorvastatin 40 milliGRAM(s) Oral at bedtime  carvedilol 6.25 milliGRAM(s) Oral every 12 hours  chlorhexidine 2% Cloths 1 Application(s) Topical <User Schedule>  cloNIDine 0.1 milliGRAM(s) Oral two times a day  epoetin yolanda-epbx (RETACRIT) Injectable 07574 Unit(s) IV Push <User Schedule>  heparin   Injectable 5000 Unit(s) SubCutaneous every 8 hours  hydrALAZINE 100 milliGRAM(s) Oral two times a day  isosorbide   mononitrate ER Tablet (IMDUR) 60 milliGRAM(s) Oral daily  levETIRAcetam 500 milliGRAM(s) Oral two times a day  pantoprazole    Tablet 40 milliGRAM(s) Oral before breakfast  tamsulosin 0.4 milliGRAM(s) Oral at bedtime  torsemide 20 milliGRAM(s) Oral daily    MEDICATIONS  (PRN):  acetaminophen     Tablet .. 650 milliGRAM(s) Oral every 6 hours PRN Temp greater or equal to 38C (100.4F), Mild Pain (1 - 3)  melatonin 3 milliGRAM(s) Oral at bedtime PRN Insomnia  ondansetron Injectable 4 milliGRAM(s) IV Push every 8 hours PRN Nausea and/or Vomiting    Vital Signs Last 24 Hrs  T(C): 36.5 (22 May 2025 07:36), Max: 36.8 (21 May 2025 23:28)  T(F): 97.7 (22 May 2025 07:36), Max: 98.3 (21 May 2025 23:28)  HR: 66 (22 May 2025 07:36) (65 - 69)  BP: 132/59 (22 May 2025 07:36) (108/58 - 151/65)  BP(mean): 74 (21 May 2025 11:30) (74 - 74)  RR: 18 (22 May 2025 07:36) (18 - 20)  SpO2: 94% (22 May 2025 07:36) (93% - 97%)    Parameters below as of 22 May 2025 07:36  Patient On (Oxygen Delivery Method): room air          PHYSICAL EXAM:  General: awake   Lungs: CTA , diminished at bases   Cardio: RRR, S1/S2, No murmur  Abdomen: Soft, Nontender, Nondistended; Bowel sounds present  Extremities: No calf tenderness, No pitting edema    LABS:                        9.4    4.53  )-----------( 120      ( 22 May 2025 04:00 )             30.0     05-20    136  |  98  |  71.9  ----------------------------<  86  3.9   |  22.0  |  5.34    Ca    8.0      20 May 2025 13:06  Phos  4.4     05-20  Mg     1.6     05-20    TPro  7.1  /  Alb  4.4  /  TBili  1.3  /  DBili  x   /  AST  21  /  ALT  17  /  AlkPhos  95  05-19          PT/INR - ( 19 May 2025 12:05 )   PT: 15.2 sec;   INR: 1.31 ratio         PTT - ( 19 May 2025 12:05 )  PTT:34.0 sec              12:05 - VBG - pH: 7.360 | pCO2: 42    | pO2: 100   | Lactate: 0.70     ABG - ( 20 May 2025 04:20 )  pH, Arterial: 7.430 pH, Blood: x     /  pCO2: 39    /  pO2: 60    / HCO3: 26    / Base Excess: 1.6   /  SaO2: 94.8                        Urinalysis Basic - ( 20 May 2025 13:06 )    Color: x / Appearance: x / SG: x / pH: x  Gluc: 86 mg/dL / Ketone: x  / Bili: x / Urobili: x   Blood: x / Protein: x / Nitrite: x   Leuk Esterase: x / RBC: x / WBC x   Sq Epi: x / Non Sq Epi: x / Bacteria: x          RADIOLOGY & ADDITIONAL TESTS:

## 2025-05-22 NOTE — PROGRESS NOTE ADULT - PROBLEM SELECTOR PLAN 1
.  - no edema on exam today. improved work of breathing   - TTE with newly reduced EF 35-40%, severe pulm HTN  - CT Chest- Diffuse indistinct groundglass opacities and interlobular septal thickening  - Pro BNP 50K  - receiving extra HD sessions this visit   - cont home medications hydralazine, imdur, coreg, torsemide   - stable for LHC/RHC today, patient is euvolemic and H/H now 9.4/30

## 2025-05-22 NOTE — CONSULT NOTE ADULT - ASSESSMENT
Assessment and Plan   HPI:  63 yo M with PMH with hx of ESRD on HD, A. fib s/p ablation, LAAO device, CAD s/p CABG x2, bioprosthetic AVR/MR repair, MV endocarditis 2023, type A aortic dissection s/p repair w/ ischemic bowel s/p resection, hx substance abuse, presenting with shortness of breath. Patient reports that these symptoms started yesterday and today he went to a scheduled appointment with his cardiologist, who subsequently referred him to the hospital due to his breathing difficulties. Patient reports dietary and medication non-discretion this past week. Was placed on oxygen. Admitted to medicine for further management.  (19 May 2025 17:09)      Indication:     Risk Assessments   ASA:  Mallampati:  BRA:  Plan:   -LHC   -Preferred access: RRA vs RFA  -EKG and labs reviewed  -Aspirin 81mg po pre procedure ordered   -250 0.9% NS bolus pre procedure: TANIKA ppx ordered   -risks and benefits discussed with patient, consent obtained     Pt assessed, appropriate for sedation, pt educated regarding the plan for Versed/Fentanyl as needed.    Risks, benefits, and alternatives reviewed.  Risks including but not limited to MI, death, stroke, bleeding, infection, vessel injury, hematoma, renal failure, allergic reaction, urgent open heart surgery, restenosis and stent thrombosis were reviewed.  All questions answered.  Patient is agreeable to proceed.     Assessment and Plan   HPI:  63 yo M with PMH with hx of ESRD on HD, A. fib s/p ablation, LAAO device, CAD s/p CABG x2, bioprosthetic AVR/MR repair, MV endocarditis 2023, type A aortic dissection s/p repair w/ ischemic bowel s/p resection, hx substance abuse, presenting with shortness of breath. Patient reports that these symptoms started yesterday and today he went to a scheduled appointment with his cardiologist, who subsequently referred him to the hospital due to his breathing difficulties. Patient reports dietary and medication non-discretion this past week. Was placed on oxygen. Admitted to medicine for further management.  (19 May 2025 17:09)      Indication:     Risk Assessments   ASA: 3  Mallampati: 2   BRA:  Plan:   -[facility]   -Preferred access: RFA/RFV  -EKG and labs reviewed  -Aspirin 81mg po pre procedure ordered   -250 0.9% NS bolus pre procedure: TANIKA ppx ordered   -risks and benefits discussed with patient, consent obtained     Pt assessed, appropriate for sedation, pt educated regarding the plan for Versed/Fentanyl as needed.    Risks, benefits, and alternatives reviewed.  Risks including but not limited to MI, death, stroke, bleeding, infection, vessel injury, hematoma, renal failure, allergic reaction, urgent open heart surgery, restenosis and stent thrombosis were reviewed.  All questions answered.  Patient is agreeable to proceed.   63 yo M with PMH with hx of ESRD on HD, A. fib s/p ablation, LAAO device, CAD s/p CABG x2, bioprosthetic AVR/MR repair, MV endocarditis 2023, type A aortic dissection s/p repair w/ ischemic bowel s/p resection, hx substance abuse, presenting with shortness of breath. Patient reports that these symptoms started on 05/18/2025 and prior to admission,  he went to a scheduled appointment with his cardiologist, who subsequently referred him to the hospital due to his breathing difficulties. Patient reports dietary and medication non-discretion this past week. Was placed on oxygen. Admitted to medicine for further management.  (19 May 2025 17:09)    Indication:  SOB    Risk Assessments   ASA: 3  Mallampati: 2   BRA: 6.2 %    Plan:   -RHC and LHC   -Preferred access: RFA/RFV  -EKG and labs reviewed  -Aspirin 81mg po pre procedure ordered   -250 0.9% NS bolus pre procedure: TANIKA ppx ordered . No indicated since pt is on HD  -risks and benefits discussed with patient, consent obtained     Pt assessed, appropriate for sedation, pt educated regarding the plan for Versed/Fentanyl as needed.    Risks, benefits, and alternatives reviewed.  Risks including but not limited to MI, death, stroke, bleeding, infection, vessel injury, hematoma, renal failure, allergic reaction, urgent open heart surgery, restenosis and stent thrombosis were reviewed.  All questions answered.  Patient is agreeable to proceed.

## 2025-05-22 NOTE — PROGRESS NOTE ADULT - SUBJECTIVE AND OBJECTIVE BOX
NEPHROLOGY INTERVAL HPI/OVERNIGHT EVENTS:  pt clinically stable  tolerated HD yesterday  no cp, sob, n/v/d now    MEDICATIONS  (STANDING):  amLODIPine   Tablet 5 milliGRAM(s) Oral daily  aspirin enteric coated 81 milliGRAM(s) Oral daily  atorvastatin 40 milliGRAM(s) Oral at bedtime  carvedilol 6.25 milliGRAM(s) Oral every 12 hours  chlorhexidine 2% Cloths 1 Application(s) Topical <User Schedule>  cloNIDine 0.1 milliGRAM(s) Oral two times a day  epoetin yolanda-epbx (RETACRIT) Injectable 84278 Unit(s) IV Push <User Schedule>  heparin   Injectable 5000 Unit(s) SubCutaneous every 8 hours  hydrALAZINE 100 milliGRAM(s) Oral two times a day  isosorbide   mononitrate ER Tablet (IMDUR) 60 milliGRAM(s) Oral daily  levETIRAcetam 500 milliGRAM(s) Oral two times a day  pantoprazole    Tablet 40 milliGRAM(s) Oral before breakfast  tamsulosin 0.4 milliGRAM(s) Oral at bedtime  torsemide 20 milliGRAM(s) Oral daily    MEDICATIONS  (PRN):  acetaminophen     Tablet .. 650 milliGRAM(s) Oral every 6 hours PRN Temp greater or equal to 38C (100.4F), Mild Pain (1 - 3)  melatonin 3 milliGRAM(s) Oral at bedtime PRN Insomnia  ondansetron Injectable 4 milliGRAM(s) IV Push every 8 hours PRN Nausea and/or Vomiting      Allergies    penicillin (Other)        Vital Signs Last 24 Hrs  T(C): 36.5 (22 May 2025 07:36), Max: 36.8 (21 May 2025 23:28)  T(F): 97.7 (22 May 2025 07:36), Max: 98.3 (21 May 2025 23:28)  HR: 66 (22 May 2025 07:36) (65 - 69)  BP: 132/59 (22 May 2025 07:36) (108/58 - 151/65)  BP(mean): 74 (21 May 2025 11:30) (74 - 74)  RR: 18 (22 May 2025 07:36) (18 - 20)  SpO2: 94% (22 May 2025 07:36) (93% - 97%)    Parameters below as of 22 May 2025 07:36  Patient On (Oxygen Delivery Method): room air        PHYSICAL EXAM:  GENERAL: Appears chronically ill  HEENT: Moist MMs  NECK: Supple, No JVD  NERVOUS SYSTEM:  A/O x3  CHEST: Clear, diminished BS  HEART:  RRR, no rub  ABDOMEN: Soft, NT/ND BS+  EXTREMITIES: Tr dependent edema  SKIN: No rashes    LABS:                        9.4    4.53  )-----------( 120      ( 22 May 2025 04:00 )             30.0     05-20    136  |  98  |  71.9[H]  ----------------------------<  86  3.9   |  22.0  |  5.34[H]    Ca    8.0[L]      20 May 2025 13:06        Urinalysis Basic - ( 20 May 2025 13:06 )    Color: x / Appearance: x / SG: x / pH: x  Gluc: 86 mg/dL / Ketone: x  / Bili: x / Urobili: x   Blood: x / Protein: x / Nitrite: x   Leuk Esterase: x / RBC: x / WBC x   Sq Epi: x / Non Sq Epi: x / Bacteria: x          RADIOLOGY & ADDITIONAL TESTS:

## 2025-05-22 NOTE — PROCEDURE NOTE - ADDITIONAL PROCEDURE DETAILS
April FRANK Atrial Leadless Pacemaker model#[id number] DOI: 4/15/25 Implanting physician: Ramin ALBRIGHT 28%   Device function normal.

## 2025-05-22 NOTE — PROGRESS NOTE ADULT - ASSESSMENT
65 yo M with PMH with hx of ESRD on HD, A. fib s/p ablation, LAAO device, CAD s/p CABG x2, bioprosthetic AVR/MR repair, MV endocarditis 2023, type A aortic dissection s/p repair w/ ischemic bowel s/p resection, hx substance abuse, presenting with shortness of breath. Patient reports that these symptoms started yesterday and today he went to a scheduled appointment with his cardiologist, who subsequently referred him to the hospital due to his breathing difficulties. Patient reports dietary and medication non-discretion this past week. Was placed on oxygen. Admitted to medicine for further management.       1-Acute hypoxic respiratory failure secondary to fluid overload HFrEF   on HD   TTE with new drop in EF   RHC / LHC today per cardio  cont coreg , and HD diuretics   strict intake output   check pulse ox on RA prior DC       2- Anemia of cronic kidney disease   s/p blood tx with HD 5/22  repeat hb improved     3- ESRD on HD  cont HD per nephrology   will need session today post cath     4- HTN , elevated on admission   now better   hold BP meds for soft BP   adjust meds to avoid hypotension   High on admission probably noncompliance      5- h/o CAD, CABG  C/W ASA  c/W  imdur, Coreg,, Torsemide, hydralazine    6-Seizure  C/w Keppra  stable    DC in 24 hours likely     DVT prophylaxis: Heparin sq

## 2025-05-22 NOTE — DISCHARGE NOTE PROVIDER - NSDCMRMEDTOKEN_GEN_ALL_CORE_FT
amLODIPine 10 mg oral tablet: 1 tab(s) orally once a day  aspirin 81 mg oral delayed release tablet: 1 tab(s) orally once a day  atorvastatin 40 mg oral tablet: 1 tab(s) orally once a day (at bedtime)  carvedilol 6.25 mg oral tablet: 1 tab(s) orally 2 times a day  cloNIDine 0.3 mg oral tablet: 1 tab(s) orally 2 times a day  hydrALAZINE 100 mg oral tablet: 1 tab(s) orally 2 times a day  isosorbide mononitrate 60 mg oral tablet, extended release: 1 tab(s) orally once a day  Keppra 500 mg oral tablet: 1 tab(s) orally 2 times a day  Lomotil 2.5 mg-0.025 mg oral tablet: 1 tab(s) orally every 6 hours  pantoprazole 40 mg oral delayed release tablet: 1 tab(s) orally once a day (before a meal)  tamsulosin 0.4 mg oral capsule: 1 cap(s) orally once a day (at bedtime)  torsemide 20 mg oral tablet: 1 tab(s) orally once a day   aspirin 81 mg oral delayed release tablet: 1 tab(s) orally once a day  atorvastatin 40 mg oral tablet: 1 tab(s) orally once a day (at bedtime)  Calphron 667 mg oral tablet: 1 tab(s) orally 3 times a day orally  carvedilol 6.25 mg oral tablet: 1 tab(s) orally every 12 hours  Entresto 24 mg-26 mg oral tablet: 1 tab(s) orally 2 times a day  FeroSul 325 mg (65 mg elemental iron) oral tablet: 1 tab(s) orally once a day  folic acid 1 mg oral tablet: 1 tab(s) orally once a day  isosorbide mononitrate 30 mg oral tablet, extended release: 1 tab(s) orally once a day  levETIRAcetam 500 mg oral tablet: 1 tab(s) orally 2 times a day  Lomotil 2.5 mg-0.025 mg oral tablet: 1 tab(s) orally every 6 hours  pantoprazole 40 mg oral delayed release tablet: 1 tab(s) orally once a day (before a meal)  Roxicodone 15 mg oral tablet: 1 tab(s) orally every 6 hours as needed for Severe Pain (7 - 10) MDD: 4 tablets  tamsulosin 0.4 mg oral capsule: 1 cap(s) orally once a day (at bedtime)  torsemide 20 mg oral tablet: 1 tab(s) orally once a day

## 2025-05-22 NOTE — CHART NOTE - NSCHARTNOTEFT_GEN_A_CORE
Now s/p LHC via  RRA/RFA with  ************. Pt tolerated procedure well. Pt arrived to recovery in NAD and HDS.  Access site stable, no bleed/hematoma, distal pulse +.  Denies complaints of chest pain, SOB, dizziness, or palpitations    Intraprocedural findings    Stents:    Medications  Versed:  Fentanyl:  Heparin:  Plavix/Brillinta:   Omnipaque:    Closure Device:    Post Cath EKG:    ALLERGIES:   penicillin (Other)      PHYSICAL EXAM:  Constitutional: Comfortable . No acute distress.   CNS: A&O for 3. No focal deficits.   Respiratory: CTAB, unlabored   Cardiovascular: RRR normal s1 s2. No murmur. No gallop.  Gastrointestinal: Soft, non-tender. +Bowel sounds.   Extremities: 2+ Peripheral Pulses, No  edema  Groin: R/L + Benign, no hematoma, no bleeding.******************  Wrist: R/L + Benign, no hematoma, no bleeding. ******************  Psychiatric: Calm . no agitation.   Skin: Warm and dry.    VITAL SIGNS:   T(C): 36.5 (05-22-25 @ 07:36), Max: 36.8 (05-21-25 @ 23:28)  T(F): 97.7 (05-22-25 @ 07:36), Max: 98.3 (05-21-25 @ 23:28)  HR: 68 (05-22-25 @ 13:00) (65 - 69)  BP: 130/64 (05-22-25 @ 13:00) (120/63 - 151/65)  RR: 15 (05-22-25 @ 13:00) (13 - 20)  SpO2: 96% (05-22-25 @ 13:00) (93% - 100%)    s/p Cath: pt is now s/p LHC/RHC via RRA/RFA approach with  ******************    Plan:  -Formal cath report pending  -Post procedure management/monitoring per protocol  -Radial compression band removal at ***  -Bedrest for ***hours post procedure  -Groin/Radial precautions discussed with patient  -Labs and EKG in am  -NS 0.9% 250ml/hr for 1 bolus: post procedure TANIKA ppx   -Repeat ECG if any clinical indication or change on tele  -Continue current medical therapy  GDMT- Continue/Start   -Dual anti platelet therapy with Aspirin/Plavix *******.Educated regarding strict adherence with DAPT   - BB:  - Statin:  - ACE/ARB  - HOLD METFORMIN POST CATH 2 DAYS THEN RESUME AS USUAL DOSING.   -Educated regarding post procedure management and care  -Discussed the importance of RF modification  -Cardiac rehab info provided/referral and communication to cardiac rehab completed  -F/U outpt in 1-2 weeks with Cardiologist  ***  - Plan for D/C patient tomorrow am / this afternoon ************ if remains HDS, ECG and labs in am stable and without complications    Case discussed with      PLEASE DO NOT ADMINISTER BLOOD TRANSFUSION ON THIS PATIENT WITHIN 72 HOURS OF CARDIAC CATHERIZATION (UNLESS PATIENT IS HEMODYNAMICALLY UNSTABLE OR ACTIVELY BLEEDING) WITHOUT FIRST DISCUSSING WITH INTERVENTIONALIST  ______________ ON TEAMS OR CALLING CATH LAB HOLDING [phone] Now s/p RHC via  RFV with Dr. Stinson. LHC via RFA however significative arterial tortuousities, unable to access to coronary arteries. Pt tolerated procedure well. Pt arrived to recovery in NAD and HDS.  Access site stable, no bleed/hematoma, distal pulse +.  Denies complaints of chest pain, SOB, dizziness, or palpitations    Intraprocedural findings  RHC  Mildly elevated R/L filling pressures wit  increased CO/CI 9.6L/ 5.6L  SAT ; AO 89.2%  SAT; PA 71.7%  PA 52/71/35  PCW 16/28/17  RV 53/0/9  RA 12/12/8  /52/80    LHC:  Unable to pass equipment down ascending aorta, possibly in settings of tortupus prior ascending aortic graft. Recommended gated CTA for graft and coronary evaluation     Stents: None    Medications  Versed: 2 mg  Fentanyl: 50 mcg  Heparin: none  Plavix/Brillinta: none  Omnipaque: 52 ml    Closure Device: angioseal     ALLERGIES:   penicillin (Other)    PHYSICAL EXAM:  Constitutional: Comfortable . No acute distress.   CNS: A&O for 3. No focal deficits.   Respiratory: CTAB, unlabored   Cardiovascular: RRR normal s1 s2. + murmur. No gallop.  Gastrointestinal: Soft, non-tender. +Bowel sounds.   Extremities: 2+ Peripheral Pulses, No  edema  Groin: R + Benign, no hematoma, no bleeding  Psychiatric: Calm . no agitation.   Skin: Warm and dry.    VITAL SIGNS:   T(C): 36.5 (05-22-25 @ 07:36), Max: 36.8 (05-21-25 @ 23:28)  T(F): 97.7 (05-22-25 @ 07:36), Max: 98.3 (05-21-25 @ 23:28)  HR: 68 (05-22-25 @ 13:00) (65 - 69)  BP: 130/64 (05-22-25 @ 13:00) (120/63 - 151/65)  RR: 15 (05-22-25 @ 13:00) (13 - 20)  SpO2: 96% (05-22-25 @ 13:00) (93% - 100%)    s/p Cath: pt is now s/p RHC via  RFV with Dr. Stinson. LHC via RFA however significative arterial tortuousities, unable to access to coronary arteries    Intraprocedural findings  RHC  Mildly elevated R/L filling pressures wit  increased CO/CI 9.6L/ 5.6L  SAT ; AO 89.2%  SAT; PA 71.7%  PA 52/71/35  PCW 16/28/17  RV 53/0/9  RA 12/12/8  /52/80    LHC:  Unable to pass equipment down ascending aorta, possibly in settings of tortuous prior ascending aortic graft. Recommended gated CTA for graft and coronary evaluation     Plan:  -Formal cath report pending  -Post procedure management/monitoring per protocol  -Bedrest for 2 hours post procedure  -Groin precautions discussed with patient  -NS 0.9% 250ml/hr for 1 bolus: post procedure TANIKA ppx . Not given pt on HD  -Repeat ECG if any clinical indication or change on tele  -Continue current medical therapy  amLODIPine   Tablet 5 milliGRAM(s) Oral daily  aspirin enteric coated 81 milliGRAM(s) Oral daily  atorvastatin 40 milliGRAM(s) Oral at bedtime  carvedilol 6.25 milliGRAM(s) Oral every 12 hours  cloNIDine 0.1 milliGRAM(s) Oral two times a day  hydrALAZINE 100 milliGRAM(s) Oral two times a day  isosorbide   mononitrate ER Tablet (IMDUR) 60 milliGRAM(s) Oral daily  torsemide 20 milliGRAM(s) Oral daily  -Educated regarding post procedure management and care  -Discussed the importance of RF modification   -General cardiology following     Case discussed with Dr Ireland     PLEASE DO NOT ADMINISTER BLOOD TRANSFUSION ON THIS PATIENT WITHIN 72 HOURS OF CARDIAC CATHERIZATION (UNLESS PATIENT IS HEMODYNAMICALLY UNSTABLE OR ACTIVELY BLEEDING) WITHOUT FIRST DISCUSSING WITH INTERVENTIONALIST DR. Stinson ON TEAMS OR CALLING CATH LAB HOLDING [phone] Now s/p RHC via RFV with Dr. Ireland. LHC via RFA however significant ascending aortic tortuosity in setting of prior history of Type A dissection repair, unable to advance catheters down ascending aorta, unable to perform coronary angiogram, aortogram performed.. Pt tolerated procedure well. Pt arrived to recovery in NAD and HDS.  Access site stable, no bleed/hematoma, distal pulse +.  Denies complaints of chest pain, SOB, dizziness, or palpitations    Intraprocedural findings  RHC  Mildly elevated R/L filling pressures wit  increased CO/CI 9.6L/ 5.6L  SAT ; AO 89.2%  SAT; PA 71.7%  PA 52/71/35  PCW 16/28/17  RV 53/0/9  RA 12/12/8  /52/80    LHC:  Unable to pass catheters to proximal ascending aorta, likely in setting of prior type A dissection repair, possible graft tortuosity. Recommended gated CTA for anatomic evaluation of native coronaries and SVG-OM and SVG-RPDA grafts.     Stents: None    Medications  Versed: 2 mg  Fentanyl: 50 mcg  Heparin: none  Plavix/Brillinta: none  Omnipaque: 52 ml    Closure Device: Angioseal     ALLERGIES:   penicillin (Other)    PHYSICAL EXAM:  Constitutional: Comfortable . No acute distress.   CNS: A&O for 3. No focal deficits.   Respiratory: CTAB, unlabored   Cardiovascular: RRR normal s1 s2. + murmur. No gallop.  Gastrointestinal: Soft, non-tender. +Bowel sounds.   Extremities: 2+ Peripheral Pulses, No  edema  Groin: R + Benign, no hematoma, no bleeding  Psychiatric: Calm . no agitation.   Skin: Warm and dry.    VITAL SIGNS:   T(C): 36.5 (05-22-25 @ 07:36), Max: 36.8 (05-21-25 @ 23:28)  T(F): 97.7 (05-22-25 @ 07:36), Max: 98.3 (05-21-25 @ 23:28)  HR: 68 (05-22-25 @ 13:00) (65 - 69)  BP: 130/64 (05-22-25 @ 13:00) (120/63 - 151/65)  RR: 15 (05-22-25 @ 13:00) (13 - 20)  SpO2: 96% (05-22-25 @ 13:00) (93% - 100%)    s/p Cath: pt is now s/p RHC via  RFV with Dr. Stinson. LHC via RFA however significative arterial tortuousities, unable to access to coronary arteries    Intraprocedural findings  RHC  Mildly elevated R/L filling pressures wit  increased CO/CI 9.6L/ 5.6L  SAT ; AO 89.2%  SAT; PA 71.7%  PA 52/71/35  PCW 16/28/17  RV 53/0/9  RA 12/12/8  /52/80    LHC:  Unable to pass catheters to proximal ascending aorta, likely in setting of prior type A dissection repair, possible graft tortuosity. Recommended gated CTA for anatomic evaluation of native coronaries and SVG-OM and SVG-RPDA grafts.     Plan:  -Formal cath report pending  - Recommend coronary CTA including SVG grafts x 2 for anatomic evaluation.  -Post procedure management/monitoring per protocol  -Bedrest for 2 hours post procedure  -Groin precautions discussed with patient  -NS 0.9% 250ml/hr for 1 bolus: post procedure TANIKA ppx . Not given pt on HD  -Repeat ECG if any clinical indication or change on tele  -Continue current medical therapy  amLODIPine   Tablet 5 milliGRAM(s) Oral daily  aspirin enteric coated 81 milliGRAM(s) Oral daily  atorvastatin 40 milliGRAM(s) Oral at bedtime  carvedilol 6.25 milliGRAM(s) Oral every 12 hours  cloNIDine 0.1 milliGRAM(s) Oral two times a day  hydrALAZINE 100 milliGRAM(s) Oral two times a day  isosorbide   mononitrate ER Tablet (IMDUR) 60 milliGRAM(s) Oral daily  torsemide 20 milliGRAM(s) Oral daily  -Educated regarding post procedure management and care  -Discussed the importance of RF modification   -General cardiology following     Case discussed with Dr Ireland     PLEASE DO NOT ADMINISTER BLOOD TRANSFUSION ON THIS PATIENT WITHIN 72 HOURS OF CARDIAC CATHETERIZATION (UNLESS PATIENT IS HEMODYNAMICALLY UNSTABLE OR ACTIVELY BLEEDING) WITHOUT FIRST DISCUSSING WITH INTERVENTIONALIST DR. Ireland ON TEAMS OR CALLING CATH LAB HOLDING [phone]

## 2025-05-22 NOTE — DISCHARGE NOTE PROVIDER - NSDCFUADDINST_GEN_ALL_CORE_FT
Fluid restriction 1.5 L   DEDRICK Drain care for patient / Home care if accepted: apply dry gauze dressing around drain site daily / or as needed if soaked. Record drainage daily/ empty if filled PRN, continue further management with vascular team outpatient.

## 2025-05-22 NOTE — CONSULT NOTE ADULT - SUBJECTIVE AND OBJECTIVE BOX
NOTE INCOMPLETE    [facility] Advanced Heart Failure - Initial Consult Note  [address] [place] [address]  Office Phone: [phone]/Fax: [phone]  Service/On Call Phone [phone]    HPI:  65 yo M with PMH with hx of ESRD on HD, A. fib s/p ablation, LAAO device, CAD s/p CABG x2, bioprosthetic AVR/MR repair, MV endocarditis , type A aortic dissection s/p repair w/ ischemic bowel s/p resection, hx substance abuse, presenting with shortness of breath. Patient reports that these symptoms started yesterday and today he went to a scheduled appointment with his cardiologist, who subsequently referred him to the hospital due to his breathing difficulties. Patient reports dietary and medication non-discretion this past week. Was placed on oxygen. Admitted to medicine for further management.  (19 May 2025 17:09)    ROS:  14 point ROS negative in detail apart from as documented in HPI.    Past medical and surgical history:  CHF (congestive heart failure)    CVA (cerebral vascular accident)    Seizures (last seizure 10 years ago)    HTN (hypertension)    Hyperlipemia    COVID-19 (2020)    Atrial fibrillation     Presence of Watchman left atrial appendage closure device    H/O aortic dissection s/p repair (), surgery complicated by bowel ischemia s/p bowel resection and ostomy with reversal    H/O aortic valve insufficiency    Mitral regurgitation    GIB (gastrointestinal bleeding)    CAD (coronary artery disease) s/p PCI   and CABG x 2 2020    Chronic atrial fibrillation    Aneurysm of artery of upper extremity    Anemia of chronic disease    Myocardial infarction    H/O colectomy    Status post double vessel coronary artery bypass 2020 Dr Butler    S/P (t)AVR (aortic valve replacement) 2020 Dr Butler    S/P (t) MVR (mitral valve replacement) 2020 Dr Butler    H/O aortic dissection (Type A; emergent repair )    History of renal transplant    End stage renal disease on HD (LUE AVF)    Former smoker    History of cocaine use (remote hx, currently sober)    Medications  (STANDING):  amLODIPine   Tablet 5 milliGRAM(s) Oral daily  aspirin enteric coated 81 milliGRAM(s) Oral daily  atorvastatin 40 milliGRAM(s) Oral at bedtime  carvedilol 6.25 milliGRAM(s) Oral every 12 hours  chlorhexidine 2% Cloths 1 Application(s) Topical <User Schedule>  cloNIDine 0.1 milliGRAM(s) Oral two times a day  epoetin yolanda-epbx (RETACRIT) Injectable 62822 Unit(s) IV Push <User Schedule>  heparin   Injectable 5000 Unit(s) SubCutaneous every 8 hours  hydrALAZINE 100 milliGRAM(s) Oral two times a day  isosorbide   mononitrate ER Tablet (IMDUR) 60 milliGRAM(s) Oral daily  levETIRAcetam 500 milliGRAM(s) Oral two times a day  pantoprazole    Tablet 40 milliGRAM(s) Oral before breakfast  tamsulosin 0.4 milliGRAM(s) Oral at bedtime  torsemide 20 milliGRAM(s) Oral daily    Medications  (PRN):  acetaminophen     Tablet .. 650 milliGRAM(s) Oral every 6 hours PRN Temp greater or equal to 38C (100.4F), Mild Pain (1 - 3)  melatonin 3 milliGRAM(s) Oral at bedtime PRN Insomnia  ondansetron Injectable 4 milliGRAM(s) IV Push every 8 hours PRN Nausea and/or Vomiting    Home Medications:  amLODIPine 10 mg oral tablet: 1 tab(s) orally once a day (19 May 2025 14:47)  aspirin 81 mg oral delayed release tablet: 1 tab(s) orally once a day (19 May 2025 15:22)  atorvastatin 40 mg oral tablet: 1 tab(s) orally once a day (at bedtime) (19 May 2025 14:47)  carvedilol 6.25 mg oral tablet: 1 tab(s) orally 2 times a day (19 May 2025 14:47)  cloNIDine 0.3 mg oral tablet: 1 tab(s) orally 2 times a day (19 May 2025 14:47)  hydrALAZINE 100 mg oral tablet: 1 tab(s) orally 2 times a day (19 May 2025 14:47)  isosorbide mononitrate 60 mg oral tablet, extended release: 1 tab(s) orally once a day (19 May 2025 14:47)  Keppra 500 mg oral tablet: 1 tab(s) orally 2 times a day (19 May 2025 17:07)  Lomotil 2.5 mg-0.025 mg oral tablet: 1 tab(s) orally every 6 hours (19 May 2025 14:47)  tamsulosin 0.4 mg oral capsule: 1 cap(s) orally once a day (at bedtime) (19 May 2025 14:47)  torsemide 20 mg oral tablet: 1 tab(s) orally once a day (19 May 2025 14:47)    Allergies:  penicillin (Other)    Social History:  Lives alone  Does not currently smoke, drink or use illicit substances (19 May 2025 17:09)    Family History:  hypertension    cardiac disorder (Mother)    Vital Signs Last 24 Hrs  T(C): 36.5, Max: 36.8 ( @ 23:28)  HR: 67 (65 - 69)  BP: 128/63 (120/63 - 151/65)  RR: 14 (13 - 20)  SpO2: 96% (93% - 100%)    Weight in k.9 (25)  Weight in k.5 (25)    I&O's Summary Last 24 Hrs  IN: 0 mL / OUT: 800 mL / NET: -800 mL      Physical Exam:  General: In no distress.   HEENT: EOMs intact.  Neck: Neck supple. JVP not elevated.   Chest: Clear to auscultation bilaterally.  CV: RRR. Normal S1 and S2. No murmurs, rub, or gallops. Warm peripherally.  PV: No LE edema noted. Pulses full/equal in all four extremities.  Abdomen: Soft, non-distended, non-tender.  Skin: warm, dry, no rash.  Neurology: Alert and oriented times three. Sensation intact.  Psych: Appropriate affect.    Labs:    ( 25 @ 04:00 )               9.4    4.53  )--------( 120                  30.0     ( 25 @ 14:20 )  TropHS 113   / CK x     / CKMB x      ( 25 @ 12:05 )  TropHS 117   / CK x     / CKMB x        TTE Limited W or WO Ultrasound Enhancing Agent (25 @ 17:14)      CONCLUSIONS:      1. Septal motion is abnormal consistent with previous cardiac surgery. Left ventricular systolic function is moderately to severely decreased with an ejection fraction visually estimated at 35 to 40 %.   2. Moderate to severe left ventricular hypertrophy.   3. Normal right ventricular cavity size, with normal wall thickness, and normal right ventricular systolic function.   4. A bioprosthetic valve replacement valve replacement is present in the aortic position The prosthetic valve is well seated with normal function.   5. Bioprosthetic valve present. in the mitral position. Well seated prosthetic mitral valve with normal function. There is trace intravalvular mitral regurgitation.   6. Estimated pulmonary artery systolic pressure is 83 mmHg, consistent with severe pulmonary hypertension.   7. The inferior vena cava is dilated measuring 2.53 cm in diameter, (dilated >2.1cm) with abnormal inspiratory collapse (abnormal <50%) consistent with elevated right atrial pressure (~15, range 10-20mmHg).   8. No pericardial effusion seen.      Cardiac Catheterization (25 @ 11:20)   Diagnostic Conclusions:     Significant tortuosity of ascending aorta in setting of prior surgical  type A dissection repair, unable to advance catheters to aortic  root, unable to visualize native coronary arteries or grafts.  Mildly  elevated R and L sided filling pressures with PA (52/17 (35)),  and PCWP (17). Increased CO/CI (9.6/L/min / 5.65L/min/m2)   Recommendations:     Recommend obtaining coronary and graft CTA to clarify anatomy   Continued optimal medical therapy and GDMT for HFrEF     < end of copied text >               [facility] Advanced Heart Failure - Initial Consult Note  [address] [place] [address]  Office Phone: [phone]/Fax: [phone]  Service/On Call Phone [phone]    HPI:  63 yo M with PMH with hx of ESRD on HD, A. fib s/p ablation, LAAO device, CAD s/p CABG x2, bioprosthetic AVR/MR repair, MV endocarditis , type A aortic dissection s/p repair w/ ischemic bowel s/p resection, hx substance abuse, presenting with shortness of breath. Patient reports that these symptoms started yesterday and today he went to a scheduled appointment with his cardiologist, who subsequently referred him to the hospital due to his breathing difficulties. Patient reports dietary and medication non-discretion this past week. Was placed on oxygen. Admitted to medicine for further management.  (19 May 2025 17:09)    ROS:  14 point ROS negative in detail apart from as documented in HPI.    Past medical and surgical history:  CHF (congestive heart failure)    CVA (cerebral vascular accident)    Seizures (last seizure 10 years ago)    HTN (hypertension)    Hyperlipemia    COVID-19 (2020)    Atrial fibrillation     Presence of Watchman left atrial appendage closure device    H/O aortic dissection s/p repair (), surgery complicated by bowel ischemia s/p bowel resection and ostomy with reversal    H/O aortic valve insufficiency    Mitral regurgitation    GIB (gastrointestinal bleeding)    CAD (coronary artery disease) s/p PCI   and CABG x 2 2020    Chronic atrial fibrillation    Aneurysm of artery of upper extremity    Anemia of chronic disease    Myocardial infarction    H/O colectomy    Status post double vessel coronary artery bypass 2020 Dr Butler    S/P (t)AVR (aortic valve replacement) 2020 Dr Butler    S/P (t) MVR (mitral valve replacement) 2020 Dr Butler    H/O aortic dissection (Type A; emergent repair )    History of renal transplant    End stage renal disease on HD (LUE AVF)    Former smoker    History of cocaine use (remote hx, currently sober)    Medications  (STANDING):  amLODIPine   Tablet 5 milliGRAM(s) Oral daily  aspirin enteric coated 81 milliGRAM(s) Oral daily  atorvastatin 40 milliGRAM(s) Oral at bedtime  carvedilol 6.25 milliGRAM(s) Oral every 12 hours  chlorhexidine 2% Cloths 1 Application(s) Topical <User Schedule>  cloNIDine 0.1 milliGRAM(s) Oral two times a day  epoetin yolanda-epbx (RETACRIT) Injectable 59470 Unit(s) IV Push <User Schedule>  heparin   Injectable 5000 Unit(s) SubCutaneous every 8 hours  hydrALAZINE 100 milliGRAM(s) Oral two times a day  isosorbide   mononitrate ER Tablet (IMDUR) 60 milliGRAM(s) Oral daily  levETIRAcetam 500 milliGRAM(s) Oral two times a day  pantoprazole    Tablet 40 milliGRAM(s) Oral before breakfast  tamsulosin 0.4 milliGRAM(s) Oral at bedtime  torsemide 20 milliGRAM(s) Oral daily    Medications  (PRN):  acetaminophen     Tablet .. 650 milliGRAM(s) Oral every 6 hours PRN Temp greater or equal to 38C (100.4F), Mild Pain (1 - 3)  melatonin 3 milliGRAM(s) Oral at bedtime PRN Insomnia  ondansetron Injectable 4 milliGRAM(s) IV Push every 8 hours PRN Nausea and/or Vomiting    Home Medications:  amLODIPine 10 mg oral tablet: 1 tab(s) orally once a day (19 May 2025 14:47)  aspirin 81 mg oral delayed release tablet: 1 tab(s) orally once a day (19 May 2025 15:22)  atorvastatin 40 mg oral tablet: 1 tab(s) orally once a day (at bedtime) (19 May 2025 14:47)  carvedilol 6.25 mg oral tablet: 1 tab(s) orally 2 times a day (19 May 2025 14:47)  cloNIDine 0.3 mg oral tablet: 1 tab(s) orally 2 times a day (19 May 2025 14:47)  hydrALAZINE 100 mg oral tablet: 1 tab(s) orally 2 times a day (19 May 2025 14:47)  isosorbide mononitrate 60 mg oral tablet, extended release: 1 tab(s) orally once a day (19 May 2025 14:47)  Keppra 500 mg oral tablet: 1 tab(s) orally 2 times a day (19 May 2025 17:07)  Lomotil 2.5 mg-0.025 mg oral tablet: 1 tab(s) orally every 6 hours (19 May 2025 14:47)  tamsulosin 0.4 mg oral capsule: 1 cap(s) orally once a day (at bedtime) (19 May 2025 14:47)  torsemide 20 mg oral tablet: 1 tab(s) orally once a day (19 May 2025 14:47)    Allergies:  penicillin (Other)    Social History:  Lives alone  Does not currently smoke, drink or use illicit substances (19 May 2025 17:09)    Family History:  hypertension    cardiac disorder (Mother)    Vital Signs Last 24 Hrs  T(C): 36.5, Max: 36.8 ( @ 23:28)  HR: 67 (65 - 69)  BP: 128/63 (120/63 - 151/65)  RR: 14 (13 - 20)  SpO2: 96% (93% - 100%)    Weight in k.9 (25)  Weight in k.5 (25)    I&O's Summary Last 24 Hrs  IN: 0 mL / OUT: 800 mL / NET: -800 mL    Physical Exam:  General: In no distress.   HEENT: EOMs intact.  Neck: Neck supple. JVP not elevated.   Chest: Clear to auscultation bilaterally.  CV: RRR. Normal S1 and S2. No murmurs, rub, or gallops. Warm peripherally.  PV: No LE edema noted. Pulses full/equal in all four extremities.  Abdomen: Soft, non-distended, non-tender.  Skin: warm, dry, no rash.  Neurology: Alert and oriented times three. Sensation intact.  Psych: Appropriate affect.    Labs:    ( 25 @ 04:00 )               9.4    4.53  )--------( 120                  30.0     ( 25 @ 14:20 )  TropHS 113   / CK x     / CKMB x      ( 25 @ 12:05 )  TropHS 117   / CK x     / CKMB x        TTE Limited W or WO Ultrasound Enhancing Agent (25 @ 17:14)      CONCLUSIONS:      1. Septal motion is abnormal consistent with previous cardiac surgery. Left ventricular systolic function is moderately to severely decreased with an ejection fraction visually estimated at 35 to 40 %.   2. Moderate to severe left ventricular hypertrophy.   3. Normal right ventricular cavity size, with normal wall thickness, and normal right ventricular systolic function.   4. A bioprosthetic valve replacement valve replacement is present in the aortic position The prosthetic valve is well seated with normal function.   5. Bioprosthetic valve present. in the mitral position. Well seated prosthetic mitral valve with normal function. There is trace intravalvular mitral regurgitation.   6. Estimated pulmonary artery systolic pressure is 83 mmHg, consistent with severe pulmonary hypertension.   7. The inferior vena cava is dilated measuring 2.53 cm in diameter, (dilated >2.1cm) with abnormal inspiratory collapse (abnormal <50%) consistent with elevated right atrial pressure (~15, range 10-20mmHg).   8. No pericardial effusion seen.      Cardiac Catheterization (25 @ 11:20)   Diagnostic Conclusions:     Significant tortuosity of ascending aorta in setting of prior surgical  type A dissection repair, unable to advance catheters to aortic  root, unable to visualize native coronary arteries or grafts.  Mildly  elevated R and L sided filling pressures with PA (52/17 (35)),  and PCWP (17). Increased CO/CI (9.6/L/min / 5.65L/min/m2)   Recommendations:     Recommend obtaining coronary and graft CTA to clarify anatomy   Continued optimal medical therapy and GDMT for HFrEF     < end of copied text >

## 2025-05-22 NOTE — DISCHARGE NOTE NURSING/CASE MANAGEMENT/SOCIAL WORK - NSDCVIVACCINE_GEN_ALL_CORE_FT
influenza, injectable, quadrivalent, preservative free; 12-Dec-2020 09:28; Lynn Gerardo); INFUSD; 724k2 (Exp. Date: 30-Jun-2021); IntraMuscular; Deltoid Right.; 0.5 milliLiter(s); VIS (VIS Published: 15-Aug-2019, VIS Presented: 12-Dec-2020);

## 2025-05-22 NOTE — CONSULT NOTE ADULT - PROBLEM SELECTOR RECOMMENDATION 9
LVEF 35-40%, LVIDd 4.6cm, normal RV size/fx  VHD: prior AVR/MVR both well seated and functioning without significant regurgitation  Arrythmias: none  Hemodynamic profile:  GDMT:  Volume management via HD. 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  Aggressive fluid removal via additional HD sessions while admitted.  Low Na, 1.5L FR diet recommended.  Compliance with HD sessions advised.  Multiple readmissions. May consider CardioMEMS however patient would have to be compliant with transmitting daily readings. LVEF 35-40%, LVIDd 4.6cm, normal RV size/fx  Etiology: Unable to obtain LHC. Recommend CTA.   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 Will ask EP to interrogate.  Hemodynamic profile:  GDMT: Increase Coreg to 12.5 mg BID. Plan to stop Clonidine and Start Entresto 24-26mg BID (first dose tonight), Imdur 60 mg daily, Norvasc 5 mg daily, HDZN 100 mg BID.  Diuretics: Torsemide 20mg PO daily  Needs onging HTN management.  Aggressive fluid removal via additional HD sessions while admitted.  Low Na, 1.5L FR diet recommended.  Compliance with HD sessions advised.  Multiple readmissions. May consider CardioMEMS however patient would have to be compliant with transmitting daily readings. LVEF 35-40%, LVIDd 4.6cm, normal RV size/fx  Etiology: Unable to obtain LHC. Recommend CTA.   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 Will ask EP to interrogate.  Hemodynamic profile:  GDMT: Increase Coreg to 12.5 mg BID. Stop Norvasc and Clonidine. Start Entresto 24-26mg BID (first dose tonight), C/w Imdur 60 mg daily, HDZN 100 mg BID for now.  Diuretics: Torsemide 20mg PO daily  Needs onging HTN management.  Aggressive fluid removal via additional HD sessions while admitted.  Low Na, 1.5L FR diet recommended.  Compliance with HD sessions advised.  Multiple readmissions. May consider CardioMEMS however patient would have to be compliant with transmitting daily readings.

## 2025-05-22 NOTE — DISCHARGE NOTE NURSING/CASE MANAGEMENT/SOCIAL WORK - FINANCIAL ASSISTANCE
[facility] provides services at a reduced cost to those who are determined to be eligible through [facility]’s financial assistance program. Information regarding [facility]’s financial assistance program can be found by going to [email or web address] or by calling [phone]. Jaundice

## 2025-05-22 NOTE — DISCHARGE NOTE PROVIDER - NSDCCPTREATMENT_GEN_ALL_CORE_FT
PRINCIPAL PROCEDURE  Procedure: Left heart cardiac cath  Findings and Treatment: No heavy lifting, driving, sex, tub baths, swimming, or any activity that submerges the lower half of the body in water for 5 days.  Limited walking and stairs for 5 dayss.    Change the bandaid after 24 hours and every 24 hours after that.  Keep the puncture site dry and covered with a bandaid until a scab forms.    Observe the site frequently.  If bleeding or a large lump (the size of a golf ball or bigger) occurs lie flat, apply continuous direct pressure just above the puncture site for at least 10 minutes, and notify your physician immediately.  If the bleeding cannot be controlled, call 911 immediately for assistance.  Notify your physician of pain, swelling or any drainage.    Notify your physician immediately if coldness, numbness, discoloration or pain in your foot occurs.

## 2025-05-22 NOTE — CONSULT NOTE ADULT - NS ATTEND AMEND GEN_ALL_CORE FT
Mr. Cummins is a very pleasant 64 year old gentleman with significant PMH ESRD s/p renal transplant on HD Tu/Th/Sat, type A aortic dissection repair (2010), ischemic bowel resection, CAD s/p CABG x 2 (SVG-OM1 and SVG-RPDA), bioprosthetic AVR/MVR (2020 Dr. Butler), Afib s/p ablation (2021), LAAO device (8/2024), MV endocarditis (2023), remote history of substance abuse and former smoker (abstinent since 2010) who presented with c/o shortness of breath. Patient reports not missing any dialysis sessions recently. His TTE during this admission shows decline in LV function compared to prior TTE from last month showing preserved LVEF. CAG was unsuccessful given type A aortic dissection repair. Coronary CT ordered for further evaluation of the grafts and native coronary arteries. Etiology likely from uncontrolled hypertension vs CAD. RHC showed high output given arm AV fistula. Unlikely to be high output heart failure given LVEF was normal last month. Unlikely to have this degree of LV dysfunction within a month from high output heart failure and hence lower on the differential.
Patiotoniel has HFPEF>  history of severe pAH. has not checked again  he is on oxygen. for unkown reason.  I reviewed CT francis gfrom  few months ago.  mild lilia opacities but nothing significant at that time.    currently no significant leg edema.  probabyl might have missed HD.  ois able to lay flat.  still requireing oxygen at home for unkown reason.  still makes urine  received a dose of IV lasix in ER.   BUN and cr is markedly elevated.  Anion gap is elevated.   and overall, the lung findings,  the cracles look velctro instea dof classic heart failure.     will get CT non contrast.  the degree of hypoxia out of proportion.  his Cxr findings could be pulmonary.   f/u limited echo.  repeat PA pressures.  ABG.     Tomorrow after the above information, decide if patient  dry or hypoxia due to lung findings.  and also consider right heart cath to confirm right heart pressures if pulmonary etiology is ruled out.  Patient will get HD during the admission too

## 2025-05-22 NOTE — DISCHARGE NOTE PROVIDER - PROVIDER TOKENS
PROVIDER:[TOKEN:[[id number]],FOLLOWUP:[1 week]] PROVIDER:[TOKEN:[[id number]],FOLLOWUP:[1 week]],PROVIDER:[TOKEN:[[id number]],FOLLOWUP:[1-3 days]],PROVIDER:[TOKEN:[[id number]],FOLLOWUP:[2 weeks]],FREE:[LAST:[PCP],PHONE:[(   )    -],FAX:[(   )    -]],PROVIDER:[TOKEN:[[id number]],FOLLOWUP:[2 weeks]]

## 2025-05-22 NOTE — DISCHARGE NOTE PROVIDER - ATTENDING DISCHARGE PHYSICAL EXAMINATION:
GENERAL: pt examined bedside, laying comfortably in bed in NAD  HEENT: NC/AT, moist oral mucosa  RESPIRATORY: CTABL   CARDIOVASCULAR: irregularly irregular, normal S1 and S2, +murmurs  ABDOMEN: soft, NT/ND, normoactive bowel sounds, no rebound/guarding  EXTREMITIES: No cynaosis, no clubbing, groin hematoma slightly enlarged, significantly tender in RLE with DEDRICK drain in place   NEUROLOGY: A+O to person, place, and time, no focal neurologic deficits appreciated   SKIN: pallor, Rt groin hematoma w/ dressing, drain

## 2025-05-22 NOTE — DISCHARGE NOTE PROVIDER - NSDCACTIVITY_GEN_ALL_CORE
Do not drive or operate machinery/Do not make important decisions/Walking - Indoors allowed/No heavy lifting/straining/Walking - Outdoors allowed Walking - Indoors allowed/Walking - Outdoors allowed/Activity as tolerated

## 2025-05-22 NOTE — PROGRESS NOTE ADULT - ASSESSMENT
63 y/o male ESRD HD T,Th,S, atrial fibrillation s/p ablation (PVI, CTI, Mitral line, 2/2021), LAAO with Watchman device 8/2024, type A aortic dissection s/p repair 11/2020 c/b ischemic bowel s/p resection, CAD s/p re-op sternotomy CABG x2 and bioprosthetic  AVR/ MV repair, former substance use disorder, MV endocarditis 2023, recent hospitalization 3/18 - 3/21 due to malfunctioning LUE fistula now s/p R sided IR permacath placement, bradycardia now s/p micra PPM 4/25 who now presents to [facility] with c/o dyspnea on exertion. Seen by out patient cardiology team today, sent to hospital for work up elevated BP, dyspnea. Pt reports he was in usual state of health, went to car show yesterday and was feeling sob while walking which is new for him. At this time pt endorsing some dyspnea, and nausea. Pt states he missed dialysis last week, but had two sessions back Pt went to HD yesterday. TTE reveals LVEF 35-40%, mod to severe LVH, bioprosthetic aortic and mitral valves, severe pulm HTN PASP 83 mmHg, IVC dilation with abnormal inspiratory collapse.

## 2025-05-22 NOTE — CONSULT NOTE ADULT - SUBJECTIVE AND OBJECTIVE BOX
[facility]                                              CARDIOLOGY AT [address], [place] [address]                                             Telephone: [phone]. Fax:[phone]                                                       CARDIOLOGY CONSULTATION NOTE                                                                                             History obtained by: Patient and medical record  Community Cardiologist:    obtained: Yes [  ] No [  ]  Reason for Consultation:   Available out pt records reviewed: Yes [  ] No [  ]    Chief complaint:   Patient is a 64y old  Male who presents with a chief complaint of Acute hypoxic respiratory failure (22 May 2025 10:31)      HPI:  65 yo M with PMH with hx of ESRD on HD, A. fib s/p ablation, LAAO device, CAD s/p CABG x2, bioprosthetic AVR/MR repair, MV endocarditis 2023, type A aortic dissection s/p repair w/ ischemic bowel s/p resection, hx substance abuse, presenting with shortness of breath. Patient reports that these symptoms started yesterday and today he went to a scheduled appointment with his cardiologist, who subsequently referred him to the hospital due to his breathing difficulties. Patient reports dietary and medication non-discretion this past week. Was placed on oxygen. Admitted to medicine for further management.  (19 May 2025 17:09)      Mechanical Support Device, Settings:    CARDIAC TESTING   ECHO:    STRESS:    CATH:     ELECTROPHYSIOLOGY:     PAST MEDICAL HISTORY  CHF (congestive heart failure)    CVA (cerebral vascular accident)    Chronic kidney disease    Seizures    HTN (hypertension)    Hyperlipemia    COVID-19    Atrial fibrillation    ESRD on dialysis    Former smoker    History of cocaine use    H/O aortic dissection    H/O aortic valve insufficiency    Mitral regurgitation    GIB (gastrointestinal bleeding)    CAD (coronary artery disease)    Chronic atrial fibrillation    Aneurysm of artery of upper extremity    Anemia of chronic disease    End stage renal disease    Myocardial infarction    COVID-19 vaccine series completed    Port-A-Cath in place        PAST SURGICAL HISTORY  Aorta disorder    H/O colectomy    Status post double vessel coronary artery bypass    S/P AVR (aortic valve replacement)    S/P MVR (mitral valve replacement)    H/O aortic dissection    AV fistula    History of renal transplant    Presence of Watchman left atrial appendage closure device        SOCIAL HISTORY:  Denies smoking/alcohol/drugs    FAMILY HISTORY:  FH: hypertension    Family history of cardiac disorder (Mother)  mother      Family History of Cardiovascular Disease:  Yes [  ] No [  ]  Coronary Artery Disease in first degree relative: Yes [  ] No [  ]  Sudden Cardiac Death in First degree relative: Yes [  ] No [  ]    HOME MEDICATIONS:  amLODIPine 10 mg oral tablet: 1 tab(s) orally once a day (19 May 2025 14:47)  aspirin 81 mg oral delayed release tablet: 1 tab(s) orally once a day (19 May 2025 15:22)  atorvastatin 40 mg oral tablet: 1 tab(s) orally once a day (at bedtime) (19 May 2025 14:47)  carvedilol 6.25 mg oral tablet: 1 tab(s) orally 2 times a day (19 May 2025 14:47)  cloNIDine 0.3 mg oral tablet: 1 tab(s) orally 2 times a day (19 May 2025 14:47)  hydrALAZINE 100 mg oral tablet: 1 tab(s) orally 2 times a day (19 May 2025 14:47)  isosorbide mononitrate 60 mg oral tablet, extended release: 1 tab(s) orally once a day (19 May 2025 14:47)  Keppra 500 mg oral tablet: 1 tab(s) orally 2 times a day (19 May 2025 17:07)  Lomotil 2.5 mg-0.025 mg oral tablet: 1 tab(s) orally every 6 hours (19 May 2025 14:47)  tamsulosin 0.4 mg oral capsule: 1 cap(s) orally once a day (at bedtime) (19 May 2025 14:47)  torsemide 20 mg oral tablet: 1 tab(s) orally once a day (19 May 2025 14:47)      CURRENT CARDIAC MEDICATIONS:  amLODIPine   Tablet 5 milliGRAM(s) Oral daily  carvedilol 6.25 milliGRAM(s) Oral every 12 hours  cloNIDine 0.1 milliGRAM(s) Oral two times a day  hydrALAZINE 100 milliGRAM(s) Oral two times a day  isosorbide   mononitrate ER Tablet (IMDUR) 60 milliGRAM(s) Oral daily  torsemide 20 milliGRAM(s) Oral daily      CURRENT OTHER MEDICATIONS:  acetaminophen     Tablet .. 650 milliGRAM(s) Oral every 6 hours PRN Temp greater or equal to 38C (100.4F), Mild Pain (1 - 3)  levETIRAcetam 500 milliGRAM(s) Oral two times a day  melatonin 3 milliGRAM(s) Oral at bedtime PRN Insomnia  ondansetron Injectable 4 milliGRAM(s) IV Push every 8 hours PRN Nausea and/or Vomiting  pantoprazole    Tablet 40 milliGRAM(s) Oral before breakfast  aspirin enteric coated 81 milliGRAM(s) Oral daily  atorvastatin 40 milliGRAM(s) Oral at bedtime  chlorhexidine 2% Cloths 1 Application(s) Topical <User Schedule>  epoetin yolanda-epbx (RETACRIT) Injectable 77417 Unit(s) IV Push <User Schedule>  heparin   Injectable 5000 Unit(s) SubCutaneous every 8 hours  magnesium sulfate  IVPB 2 Gram(s) IV Intermittent once, Stop order after: 1 Doses  tamsulosin 0.4 milliGRAM(s) Oral at bedtime      ALLERGIES:   penicillin (Other)      REVIEW OF SYMPTOMS:   CONSTITUTIONAL: No fever, no chills, no weight loss, no weight gain, no fatigue   ENMT:  No vertigo; No sinus or throat pain  NECK: No pain or stiffness  CARDIOVASCULAR: No chest pain, no dyspnea, no syncope/presyncope, no palpitations, no dizziness, no Orthopnea, no Paroxsymal nocturnal dyspnea  RESPIRATORY: no Shortness of breath, no cough, no wheezing  : No dysuria, no hematuria   GI: No nausea, no diarrhea, no constipation, no abdominal pain   NEURO: No headache, no slurred speech   MUSCULOSKELETAL: No joint pain or swelling; No muscle, back, or extremity pain  PSYCH: No agitation, no anxiety.    ALL OTHER REVIEW OF SYSTEMS ARE NEGATIVE.    VITAL SIGNS:  T(C): 36.5 (05-22-25 @ 07:36), Max: 36.8 (05-21-25 @ 23:28)  T(F): 97.7 (05-22-25 @ 07:36), Max: 98.3 (05-21-25 @ 23:28)  HR: 66 (05-22-25 @ 07:36) (65 - 69)  BP: 132/59 (05-22-25 @ 07:36) (108/58 - 151/65)  RR: 18 (05-22-25 @ 07:36) (18 - 20)  SpO2: 94% (05-22-25 @ 07:36) (93% - 97%)    INTAKE AND OUTPUT:     05-21 @ 07:01  -  05-22 @ 07:00  --------------------------------------------------------  IN: 0 mL / OUT: 800 mL / NET: -800 mL        PHYSICAL EXAM:  Constitutional: Comfortable . No acute distress.   HEENT: Atraumatic and normocephalic , neck is supple . no JVD.   CNS: A&Ox3. No focal deficits.   Respiratory: CTAB, unlabored   Cardiovascular: RRR normal s1 s2. No murmur. No rubs or gallop.  Gastrointestinal: Soft, non-tender. +Bowel sounds.   Extremities: 2+ Peripheral Pulses, No  edema  Psychiatric: Calm . no agitation.   Skin: Warm and dry    LABS:                            9.4    4.53  )-----------( 120      ( 22 May 2025 04:00 )             30.0     05-20    136  |  98  |  71.9[H]  ----------------------------<  86  3.9   |  22.0  |  5.34[H]    Ca    8.0[L]      20 May 2025 13:06      Urinalysis Basic - ( 20 May 2025 13:06 )    Color: x / Appearance: x / SG: x / pH: x  Gluc: 86 mg/dL / Ketone: x  / Bili: x / Urobili: x   Blood: x / Protein: x / Nitrite: x   Leuk Esterase: x / RBC: x / WBC x   Sq Epi: x / Non Sq Epi: x / Bacteria: x              ECG:   Prior ECG: Yes [  ] No [  ]    RADIOLOGY & ADDITIONAL STUDIES:    X-ray:    CT scan:   MRI:   US:    Assessment and Plan   HPI:  65 yo M with PMH with hx of ESRD on HD, A. fib s/p ablation, LAAO device, CAD s/p CABG x2, bioprosthetic AVR/MR repair, MV endocarditis 2023, type A aortic dissection s/p repair w/ ischemic bowel s/p resection, hx substance abuse, presenting with shortness of breath. Patient reports that these symptoms started yesterday and today he went to a scheduled appointment with his cardiologist, who subsequently referred him to the hospital due to his breathing difficulties. Patient reports dietary and medication non-discretion this past week. Was placed on oxygen. Admitted to medicine for further management.  (19 May 2025 17:09)      Indication:     Risk Assessments   ASA:  Mallampati:  BRA:  Plan:   -C   -Preferred access: RRA vs RFA  -EKG and labs reviewed  -Aspirin 81mg po pre procedure ordered   -250 0.9% NS bolus pre procedure: TANIKA ppx ordered   -risks and benefits discussed with patient, consent obtained     Pt assessed, appropriate for sedation, pt educated regarding the plan for Versed/Fentanyl as needed.    Risks, benefits, and alternatives reviewed.  Risks including but not limited to MI, death, stroke, bleeding, infection, vessel injury, hematoma, renal failure, allergic reaction, urgent open heart surgery, restenosis and stent thrombosis were reviewed.  All questions answered.  Patient is agreeable to proceed.                                                [facility]                                              CARDIOLOGY AT [address], [place] [address]                                             Telephone: [phone]. Fax:[phone]                                                       CARDIOLOGY CONSULTATION NOTE                                                                                             History obtained by: Patient and medical record  Community Cardiologist: Dr Frankel   Reason for Consultation: [facility]    HPI: 63 yo M with PMH with hx of ESRD on HD, A. fib s/p ablation, LAAO device, CAD s/p CABG x2, bioprosthetic AVR/MR repair, MV endocarditis 2023, type A aortic dissection s/p repair w/ ischemic bowel s/p resection, hx substance abuse, presenting with shortness of breath. Patient reports that these symptoms started on 05/18/2025 and prior to admission,  he went to a scheduled appointment with his cardiologist, who subsequently referred him to the hospital due to his breathing difficulties. Patient reports dietary and medication non-discretion this past week. Was placed on oxygen. Admitted to medicine for further management.  (19 May 2025 17:09)    CARDIAC TESTING   TTE Limited W or WO Ultrasound Enhancing Agent (05.19.25 @ 17:14) >  CONCLUSIONS:   1. Septal motion is abnormal consistent with previous cardiac surgery. Left ventricular systolic function is moderately to severely decreased with an ejection fraction visually estimated at 35 to 40 %.   2. Moderate to severe left ventricular hypertrophy.   3. Normal right ventricular cavity size, with normal wall thickness, and normal right ventricular systolic function.   4. A bioprosthetic valve replacement valve replacement is present in the aortic position The prosthetic valve is well seated with normal function.   5. Bioprosthetic valve present. in the mitral position. Well seated prosthetic mitral valve with normal function. There is trace intravalvular mitral regurgitation.   6. Estimated pulmonary artery systolic pressure is 83 mmHg, consistent with severe pulmonary hypertension.   7. The inferior vena cava is dilated measuring 2.53 cm in diameter, (dilated >2.1cm) with abnormal inspiratory collapse (abnormal <50%) consistent with elevated right atrial pressure (~15, range 10-20mmHg).   8. No pericardial effusion seen.  < end of copied text >    PAST MEDICAL HISTORY  CHF (congestive heart failure)  CVA (cerebral vascular accident)  Chronic kidney disease  Seizures  HTN (hypertension)  Hyperlipemia  COVID-19  Atrial fibrillation  ESRD on dialysis  Former smoker  History of cocaine use  H/O aortic dissection  H/O aortic valve insufficience  Mitral regurgitation  GIB (gastrointestinal bleeding)  CAD (coronary artery disease)  Chronic atrial fibrillation  Aneurysm of artery of upper extremity  Anemia of chronic disease  End stage renal disease  Myocardial infarction  COVID-19 vaccine series completed  Port-A-Cath in place    PAST SURGICAL HISTORY  Aorta disorder  H/O colectomy  Status post double vessel coronary artery bypass  S/P AVR (aortic valve replacement)  S/P MVR (mitral valve replacement)  H/O aortic dissection  AV fistula  History of renal transplant  Presence of Watchman left atrial appendage closure device    SOCIAL HISTORY:  + former smoker (quit 2010), Hx of substance abuse. Denies alcohol    FAMILY HISTORY:  FH: hypertension    Family history of cardiac disorder (Mother)    Family History of Cardiovascular Disease:  Yes  Coronary Artery Disease in first degree relative: Yes   Sudden Cardiac Death in First degree relative: No     HOME MEDICATIONS:  amLODIPine 10 mg oral tablet: 1 tab(s) orally once a day (19 May 2025 14:47)  aspirin 81 mg oral delayed release tablet: 1 tab(s) orally once a day (19 May 2025 15:22)  atorvastatin 40 mg oral tablet: 1 tab(s) orally once a day (at bedtime) (19 May 2025 14:47)  carvedilol 6.25 mg oral tablet: 1 tab(s) orally 2 times a day (19 May 2025 14:47)  cloNIDine 0.3 mg oral tablet: 1 tab(s) orally 2 times a day (19 May 2025 14:47)  hydrALAZINE 100 mg oral tablet: 1 tab(s) orally 2 times a day (19 May 2025 14:47)  isosorbide mononitrate 60 mg oral tablet, extended release: 1 tab(s) orally once a day (19 May 2025 14:47)  Keppra 500 mg oral tablet: 1 tab(s) orally 2 times a day (19 May 2025 17:07)  Lomotil 2.5 mg-0.025 mg oral tablet: 1 tab(s) orally every 6 hours (19 May 2025 14:47)  tamsulosin 0.4 mg oral capsule: 1 cap(s) orally once a day (at bedtime) (19 May 2025 14:47)  torsemide 20 mg oral tablet: 1 tab(s) orally once a day (19 May 2025 14:47)    CURRENT CARDIAC MEDICATIONS:  amLODIPine   Tablet 5 milliGRAM(s) Oral daily  carvedilol 6.25 milliGRAM(s) Oral every 12 hours  cloNIDine 0.1 milliGRAM(s) Oral two times a day  hydrALAZINE 100 milliGRAM(s) Oral two times a day  isosorbide   mononitrate ER Tablet (IMDUR) 60 milliGRAM(s) Oral daily  torsemide 20 milliGRAM(s) Oral daily    CURRENT OTHER MEDICATIONS:  acetaminophen     Tablet .. 650 milliGRAM(s) Oral every 6 hours PRN Temp greater or equal to 38C (100.4F), Mild Pain (1 - 3)  levETIRAcetam 500 milliGRAM(s) Oral two times a day  melatonin 3 milliGRAM(s) Oral at bedtime PRN Insomnia  ondansetron Injectable 4 milliGRAM(s) IV Push every 8 hours PRN Nausea and/or Vomiting  pantoprazole    Tablet 40 milliGRAM(s) Oral before breakfast  aspirin enteric coated 81 milliGRAM(s) Oral daily  atorvastatin 40 milliGRAM(s) Oral at bedtime  chlorhexidine 2% Cloths 1 Application(s) Topical <User Schedule>  epoetin yolanda-epbx (RETACRIT) Injectable 91415 Unit(s) IV Push <User Schedule>  heparin   Injectable 5000 Unit(s) SubCutaneous every 8 hours  magnesium sulfate  IVPB 2 Gram(s) IV Intermittent once, Stop order after: 1 Doses  tamsulosin 0.4 milliGRAM(s) Oral at bedtime    ALLERGIES:   penicillin (Other)    REVIEW OF SYMPTOMS:   CONSTITUTIONAL: No fever, no chills, no weight loss, no weight gain, no fatigue   ENMT:  No vertigo; No sinus or throat pain  NECK: No pain or stiffness  CARDIOVASCULAR: No chest pain, + ZUNIGA, no syncope/presyncope, no palpitations, no dizziness, no Orthopnea, no Paroxsymal nocturnal dyspnea  RESPIRATORY: , no cough, no wheezing  GI: No nausea, no diarrhea, no constipation, no abdominal pain   NEURO: No headache, no slurred speech   MUSCULOSKELETAL: No joint pain or swelling; No muscle, back, or extremity pain  PSYCH: No agitation, no anxiety.    ALL OTHER REVIEW OF SYSTEMS ARE NEGATIVE.    VITAL SIGNS:  T(C): 36.5 (05-22-25 @ 07:36), Max: 36.8 (05-21-25 @ 23:28)  T(F): 97.7 (05-22-25 @ 07:36), Max: 98.3 (05-21-25 @ 23:28)  HR: 66 (05-22-25 @ 07:36) (65 - 69)  BP: 132/59 (05-22-25 @ 07:36) (108/58 - 151/65)  RR: 18 (05-22-25 @ 07:36) (18 - 20)  SpO2: 94% (05-22-25 @ 07:36) (93% - 97%)    INTAKE AND OUTPUT:     05-21 @ 07:01  -  05-22 @ 07:00  --------------------------------------------------------  IN: 0 mL / OUT: 800 mL / NET: -800 mL      PHYSICAL EXAM:  Constitutional: Comfortable . No acute distress.   HEENT: Atraumatic and normocephalic , neck is supple . no JVD.   CNS: A&Ox3. No focal deficits.   Respiratory:  + crackles at base b/l.  unlabored   Cardiovascular: RRR normal s1 s2. No murmur. No rubs or gallop.  Gastrointestinal: Soft, non-tender. +Bowel sounds.   Extremities: 2+ Peripheral Pulses, No  edema  Psychiatric: Calm . no agitation.   Skin: Warm and dry    LABS:                        9.4    4.53  )-----------( 120      ( 22 May 2025 04:00 )             30.0     05-20    136  |  98  |  71.9[H]  ----------------------------<  86  3.9   |  22.0  |  5.34[H]    Ca    8.0[L]      20 May 2025 13:06    ECG:   Prior ECG: Yes     RADIOLOGY & ADDITIONAL STUDIES:    X-ray:    CT scan:   MRI:   US:                                                [facility]                                              CARDIOLOGY AT [address], [place] [address]                                             Telephone: [phone]. Fax:[phone]                                                       CARDIOLOGY CONSULTATION NOTE                                                                                             History obtained by: Patient and medical record  Community Cardiologist: Dr Frankel   Reason for Consultation: [facility] and [facility]    HPI: 63 yo M with PMH with hx of ESRD on HD, A. fib s/p ablation, LAAO device, CAD s/p CABG x2, bioprosthetic AVR/MR repair, MV endocarditis 2023, type A aortic dissection s/p repair w/ ischemic bowel s/p resection, hx substance abuse, presenting with shortness of breath. Patient reports that these symptoms started on 05/18/2025 and prior to admission,  he went to a scheduled appointment with his cardiologist, who subsequently referred him to the hospital due to his breathing difficulties. Patient reports dietary and medication non-discretion this past week. Was placed on oxygen. Admitted to medicine for further management.  (19 May 2025 17:09)    CARDIAC TESTING   TTE Limited W or WO Ultrasound Enhancing Agent (05.19.25 @ 17:14) >  CONCLUSIONS:   1. Septal motion is abnormal consistent with previous cardiac surgery. Left ventricular systolic function is moderately to severely decreased with an ejection fraction visually estimated at 35 to 40 %.   2. Moderate to severe left ventricular hypertrophy.   3. Normal right ventricular cavity size, with normal wall thickness, and normal right ventricular systolic function.   4. A bioprosthetic valve replacement valve replacement is present in the aortic position The prosthetic valve is well seated with normal function.   5. Bioprosthetic valve present. in the mitral position. Well seated prosthetic mitral valve with normal function. There is trace intravalvular mitral regurgitation.   6. Estimated pulmonary artery systolic pressure is 83 mmHg, consistent with severe pulmonary hypertension.   7. The inferior vena cava is dilated measuring 2.53 cm in diameter, (dilated >2.1cm) with abnormal inspiratory collapse (abnormal <50%) consistent with elevated right atrial pressure (~15, range 10-20mmHg).   8. No pericardial effusion seen.  < end of copied text >    PAST MEDICAL HISTORY  CHF (congestive heart failure)  CVA (cerebral vascular accident)  Chronic kidney disease  Seizures  HTN (hypertension)  Hyperlipemia  COVID-19  Atrial fibrillation  ESRD on dialysis  Former smoker  History of cocaine use  H/O aortic dissection  H/O aortic valve insufficience  Mitral regurgitation  GIB (gastrointestinal bleeding)  CAD (coronary artery disease)  Chronic atrial fibrillation  Aneurysm of artery of upper extremity  Anemia of chronic disease  End stage renal disease  Myocardial infarction  COVID-19 vaccine series completed  Port-A-Cath in place    PAST SURGICAL HISTORY  Aorta disorder  H/O colectomy  Status post double vessel coronary artery bypass  S/P AVR (aortic valve replacement)  S/P MVR (mitral valve replacement)  H/O aortic dissection  AV fistula  History of renal transplant  Presence of Watchman left atrial appendage closure device    SOCIAL HISTORY:  + former smoker (quit 2010), Hx of substance abuse. Denies alcohol    FAMILY HISTORY:  FH: hypertension    Family history of cardiac disorder (Mother)    Family History of Cardiovascular Disease:  Yes  Coronary Artery Disease in first degree relative: Yes   Sudden Cardiac Death in First degree relative: No     HOME MEDICATIONS:  amLODIPine 10 mg oral tablet: 1 tab(s) orally once a day (19 May 2025 14:47)  aspirin 81 mg oral delayed release tablet: 1 tab(s) orally once a day (19 May 2025 15:22)  atorvastatin 40 mg oral tablet: 1 tab(s) orally once a day (at bedtime) (19 May 2025 14:47)  carvedilol 6.25 mg oral tablet: 1 tab(s) orally 2 times a day (19 May 2025 14:47)  cloNIDine 0.3 mg oral tablet: 1 tab(s) orally 2 times a day (19 May 2025 14:47)  hydrALAZINE 100 mg oral tablet: 1 tab(s) orally 2 times a day (19 May 2025 14:47)  isosorbide mononitrate 60 mg oral tablet, extended release: 1 tab(s) orally once a day (19 May 2025 14:47)  Keppra 500 mg oral tablet: 1 tab(s) orally 2 times a day (19 May 2025 17:07)  Lomotil 2.5 mg-0.025 mg oral tablet: 1 tab(s) orally every 6 hours (19 May 2025 14:47)  tamsulosin 0.4 mg oral capsule: 1 cap(s) orally once a day (at bedtime) (19 May 2025 14:47)  torsemide 20 mg oral tablet: 1 tab(s) orally once a day (19 May 2025 14:47)    CURRENT CARDIAC MEDICATIONS:  amLODIPine   Tablet 5 milliGRAM(s) Oral daily  carvedilol 6.25 milliGRAM(s) Oral every 12 hours  cloNIDine 0.1 milliGRAM(s) Oral two times a day  hydrALAZINE 100 milliGRAM(s) Oral two times a day  isosorbide   mononitrate ER Tablet (IMDUR) 60 milliGRAM(s) Oral daily  torsemide 20 milliGRAM(s) Oral daily    CURRENT OTHER MEDICATIONS:  acetaminophen     Tablet .. 650 milliGRAM(s) Oral every 6 hours PRN Temp greater or equal to 38C (100.4F), Mild Pain (1 - 3)  levETIRAcetam 500 milliGRAM(s) Oral two times a day  melatonin 3 milliGRAM(s) Oral at bedtime PRN Insomnia  ondansetron Injectable 4 milliGRAM(s) IV Push every 8 hours PRN Nausea and/or Vomiting  pantoprazole    Tablet 40 milliGRAM(s) Oral before breakfast  aspirin enteric coated 81 milliGRAM(s) Oral daily  atorvastatin 40 milliGRAM(s) Oral at bedtime  chlorhexidine 2% Cloths 1 Application(s) Topical <User Schedule>  epoetin yolanda-epbx (RETACRIT) Injectable 48097 Unit(s) IV Push <User Schedule>  heparin   Injectable 5000 Unit(s) SubCutaneous every 8 hours  magnesium sulfate  IVPB 2 Gram(s) IV Intermittent once, Stop order after: 1 Doses  tamsulosin 0.4 milliGRAM(s) Oral at bedtime    ALLERGIES:   penicillin (Other)    REVIEW OF SYMPTOMS:   CONSTITUTIONAL: No fever, no chills, no weight loss, no weight gain, no fatigue   ENMT:  No vertigo; No sinus or throat pain  NECK: No pain or stiffness  CARDIOVASCULAR: No chest pain, + ZUNIGA, no syncope/presyncope, no palpitations, no dizziness, no Orthopnea, no Paroxsymal nocturnal dyspnea  RESPIRATORY: , no cough, no wheezing  GI: No nausea, no diarrhea, no constipation, no abdominal pain   NEURO: No headache, no slurred speech   MUSCULOSKELETAL: No joint pain or swelling; No muscle, back, or extremity pain  PSYCH: No agitation, no anxiety.    ALL OTHER REVIEW OF SYSTEMS ARE NEGATIVE.    VITAL SIGNS:  T(C): 36.5 (05-22-25 @ 07:36), Max: 36.8 (05-21-25 @ 23:28)  T(F): 97.7 (05-22-25 @ 07:36), Max: 98.3 (05-21-25 @ 23:28)  HR: 66 (05-22-25 @ 07:36) (65 - 69)  BP: 132/59 (05-22-25 @ 07:36) (108/58 - 151/65)  RR: 18 (05-22-25 @ 07:36) (18 - 20)  SpO2: 94% (05-22-25 @ 07:36) (93% - 97%)    INTAKE AND OUTPUT:     05-21 @ 07:01  -  05-22 @ 07:00  --------------------------------------------------------  IN: 0 mL / OUT: 800 mL / NET: -800 mL    PHYSICAL EXAM:  Constitutional: Comfortable . No acute distress.   HEENT: Atraumatic and normocephalic , neck is supple . no JVD.   CNS: A&Ox3. No focal deficits.   Respiratory:  + crackles at base b/l.  unlabored   Cardiovascular: RRR normal s1 s2. No murmur. No rubs or gallop.  Gastrointestinal: Soft, non-tender. +Bowel sounds.   Extremities: 2+ Peripheral Pulses, No  edema  Psychiatric: Calm . no agitation.   Skin: Warm and dry    LABS:                        9.4    4.53  )-----------( 120      ( 22 May 2025 04:00 )             30.0     05-20    136  |  98  |  71.9[H]  ----------------------------<  86  3.9   |  22.0  |  5.34[H]    Ca    8.0[L]      20 May 2025 13:06    ECG: Sinus rhythm with PACs and PVCs. No acute changes   Prior ECG: Yes     RADIOLOGY & ADDITIONAL STUDIES:   CT Chest No Cont (05.19.25 @ 18:08) >  IMPRESSION:  Diffuse indistinct groundglass opacities and interlobular septal   thickening, likely due to interstitial lung edema. Small bibasilar   pleural effusions, relatively unchanged.  Pulmonary nodules measuring up to 7 mm, unchanged.  Mediastinal lymphadenopathy, of uncertain etiology.  Cardiomegaly.  < end of copied text >                                                  [facility]                                              CARDIOLOGY AT [address], [place] [address]                                             Telephone: [phone]. Fax:[phone]                                                       CARDIOLOGY CONSULTATION NOTE                                                                                             History obtained by: Patient and medical record  Community Cardiologist: Dr Frankel   Reason for Consultation: RHC and C    HPI: 63 yo M with PMH with hx of ESRD on HD, A. fib s/p ablation, LAAO device, CAD s/p CABG x2, bioprosthetic AVR/MR repair, MV endocarditis 2023, type A aortic dissection s/p repair w/ ischemic bowel s/p resection, hx substance abuse, presenting with shortness of breath. Patient reports that these symptoms started on 05/18/2025 and prior to admission,  he went to a scheduled appointment with his cardiologist, who subsequently referred him to the hospital due to his breathing difficulties. Patient reports dietary and medication non-discretion this past week. Was placed on oxygen. Admitted to medicine for further management.  (19 May 2025 17:09)    CARDIAC TESTING   TTE Limited W or WO Ultrasound Enhancing Agent (05.19.25 @ 17:14) >  CONCLUSIONS:   1. Septal motion is abnormal consistent with previous cardiac surgery. Left ventricular systolic function is moderately to severely decreased with an ejection fraction visually estimated at 35 to 40 %.   2. Moderate to severe left ventricular hypertrophy.   3. Normal right ventricular cavity size, with normal wall thickness, and normal right ventricular systolic function.   4. A bioprosthetic valve replacement valve replacement is present in the aortic position The prosthetic valve is well seated with normal function.   5. Bioprosthetic valve present. in the mitral position. Well seated prosthetic mitral valve with normal function. There is trace intravalvular mitral regurgitation.   6. Estimated pulmonary artery systolic pressure is 83 mmHg, consistent with severe pulmonary hypertension.   7. The inferior vena cava is dilated measuring 2.53 cm in diameter, (dilated >2.1cm) with abnormal inspiratory collapse (abnormal <50%) consistent with elevated right atrial pressure (~15, range 10-20mmHg).   8. No pericardial effusion seen.  < end of copied text >    PAST MEDICAL HISTORY  CHF (congestive heart failure)  CVA (cerebral vascular accident)  Chronic kidney disease  Seizures  HTN (hypertension)  Hyperlipemia  COVID-19  Atrial fibrillation  ESRD on dialysis  Former smoker  History of cocaine use  H/O aortic dissection  H/O aortic valve insufficience  Mitral regurgitation  GIB (gastrointestinal bleeding)  CAD (coronary artery disease)  Chronic atrial fibrillation  Aneurysm of artery of upper extremity  Anemia of chronic disease  End stage renal disease  Myocardial infarction  COVID-19 vaccine series completed  Port-A-Cath in place    PAST SURGICAL HISTORY  Aorta disorder  H/O colectomy  Status post double vessel coronary artery bypass  S/P AVR (aortic valve replacement)  S/P MVR (mitral valve replacement)  H/O aortic dissection  AV fistula  History of renal transplant  Presence of Watchman left atrial appendage closure device    SOCIAL HISTORY:  + former smoker (quit 2010), Hx of substance abuse. Denies alcohol    FAMILY HISTORY:  FH: hypertension    Family history of cardiac disorder (Mother)    Family History of Cardiovascular Disease:  Yes  Coronary Artery Disease in first degree relative: Yes   Sudden Cardiac Death in First degree relative: No     HOME MEDICATIONS:  amLODIPine 10 mg oral tablet: 1 tab(s) orally once a day (19 May 2025 14:47)  aspirin 81 mg oral delayed release tablet: 1 tab(s) orally once a day (19 May 2025 15:22)  atorvastatin 40 mg oral tablet: 1 tab(s) orally once a day (at bedtime) (19 May 2025 14:47)  carvedilol 6.25 mg oral tablet: 1 tab(s) orally 2 times a day (19 May 2025 14:47)  cloNIDine 0.3 mg oral tablet: 1 tab(s) orally 2 times a day (19 May 2025 14:47)  hydrALAZINE 100 mg oral tablet: 1 tab(s) orally 2 times a day (19 May 2025 14:47)  isosorbide mononitrate 60 mg oral tablet, extended release: 1 tab(s) orally once a day (19 May 2025 14:47)  Keppra 500 mg oral tablet: 1 tab(s) orally 2 times a day (19 May 2025 17:07)  Lomotil 2.5 mg-0.025 mg oral tablet: 1 tab(s) orally every 6 hours (19 May 2025 14:47)  tamsulosin 0.4 mg oral capsule: 1 cap(s) orally once a day (at bedtime) (19 May 2025 14:47)  torsemide 20 mg oral tablet: 1 tab(s) orally once a day (19 May 2025 14:47)    CURRENT CARDIAC MEDICATIONS:  amLODIPine   Tablet 5 milliGRAM(s) Oral daily  carvedilol 6.25 milliGRAM(s) Oral every 12 hours  cloNIDine 0.1 milliGRAM(s) Oral two times a day  hydrALAZINE 100 milliGRAM(s) Oral two times a day  isosorbide   mononitrate ER Tablet (IMDUR) 60 milliGRAM(s) Oral daily  torsemide 20 milliGRAM(s) Oral daily    CURRENT OTHER MEDICATIONS:  acetaminophen     Tablet .. 650 milliGRAM(s) Oral every 6 hours PRN Temp greater or equal to 38C (100.4F), Mild Pain (1 - 3)  levETIRAcetam 500 milliGRAM(s) Oral two times a day  melatonin 3 milliGRAM(s) Oral at bedtime PRN Insomnia  ondansetron Injectable 4 milliGRAM(s) IV Push every 8 hours PRN Nausea and/or Vomiting  pantoprazole    Tablet 40 milliGRAM(s) Oral before breakfast  aspirin enteric coated 81 milliGRAM(s) Oral daily  atorvastatin 40 milliGRAM(s) Oral at bedtime  chlorhexidine 2% Cloths 1 Application(s) Topical <User Schedule>  epoetin yolanda-epbx (RETACRIT) Injectable 55390 Unit(s) IV Push <User Schedule>  heparin   Injectable 5000 Unit(s) SubCutaneous every 8 hours  magnesium sulfate  IVPB 2 Gram(s) IV Intermittent once, Stop order after: 1 Doses  tamsulosin 0.4 milliGRAM(s) Oral at bedtime    ALLERGIES:   penicillin (Other)    REVIEW OF SYMPTOMS:   CONSTITUTIONAL: No fever, no chills, no weight loss, no weight gain, no fatigue   ENMT:  No vertigo; No sinus or throat pain  NECK: No pain or stiffness  CARDIOVASCULAR: No chest pain, + ZUNIGA, no syncope/presyncope, no palpitations, no dizziness, no Orthopnea, no Paroxsymal nocturnal dyspnea  RESPIRATORY: , no cough, no wheezing  GI: No nausea, no diarrhea, no constipation, no abdominal pain   NEURO: No headache, no slurred speech   MUSCULOSKELETAL: No joint pain or swelling; No muscle, back, or extremity pain  PSYCH: No agitation, no anxiety.    ALL OTHER REVIEW OF SYSTEMS ARE NEGATIVE.    VITAL SIGNS:  T(C): 36.5 (05-22-25 @ 07:36), Max: 36.8 (05-21-25 @ 23:28)  T(F): 97.7 (05-22-25 @ 07:36), Max: 98.3 (05-21-25 @ 23:28)  HR: 66 (05-22-25 @ 07:36) (65 - 69)  BP: 132/59 (05-22-25 @ 07:36) (108/58 - 151/65)  RR: 18 (05-22-25 @ 07:36) (18 - 20)  SpO2: 94% (05-22-25 @ 07:36) (93% - 97%)    INTAKE AND OUTPUT:     05-21 @ 07:01  -  05-22 @ 07:00  --------------------------------------------------------  IN: 0 mL / OUT: 800 mL / NET: -800 mL    PHYSICAL EXAM:  Constitutional: Comfortable . No acute distress.   HEENT: Atraumatic and normocephalic , neck is supple . no JVD.   CNS: A&Ox3. No focal deficits.   Respiratory:  + crackles at base b/l.  unlabored   Cardiovascular: RRR normal s1 s2. + murmur. No rubs or gallop.  Gastrointestinal: Soft, non-tender. +Bowel sounds.   Extremities: 2+ Peripheral Pulses, No  edema  Psychiatric: Calm . no agitation.   Skin: Warm and dry    LABS:                        9.4    4.53  )-----------( 120      ( 22 May 2025 04:00 )             30.0     05-20    136  |  98  |  71.9[H]  ----------------------------<  86  3.9   |  22.0  |  5.34[H]    Ca    8.0[L]      20 May 2025 13:06    ECG: Sinus rhythm with PACs and PVCs. No acute changes   Prior ECG: Yes     RADIOLOGY & ADDITIONAL STUDIES:   CT Chest No Cont (05.19.25 @ 18:08) >  IMPRESSION:  Diffuse indistinct groundglass opacities and interlobular septal   thickening, likely due to interstitial lung edema. Small bibasilar   pleural effusions, relatively unchanged.  Pulmonary nodules measuring up to 7 mm, unchanged.  Mediastinal lymphadenopathy, of uncertain etiology.  Cardiomegaly.  < end of copied text >

## 2025-05-23 ENCOUNTER — TRANSCRIPTION ENCOUNTER (OUTPATIENT)
Age: 65
End: 2025-05-23

## 2025-05-23 LAB
ALBUMIN SERPL ELPH-MCNC: 3.4 G/DL — SIGNIFICANT CHANGE UP (ref 3.3–5.2)
ALP SERPL-CCNC: 75 U/L — SIGNIFICANT CHANGE UP (ref 40–120)
ALT FLD-CCNC: 13 U/L — SIGNIFICANT CHANGE UP
ANION GAP SERPL CALC-SCNC: 16 MMOL/L — SIGNIFICANT CHANGE UP (ref 5–17)
APTT BLD: 31.9 SEC — SIGNIFICANT CHANGE UP (ref 26.1–36.8)
APTT BLD: 34.1 SEC — SIGNIFICANT CHANGE UP (ref 26.1–36.8)
AST SERPL-CCNC: 12 U/L — SIGNIFICANT CHANGE UP
BILIRUB SERPL-MCNC: 1.2 MG/DL — SIGNIFICANT CHANGE UP (ref 0.4–2)
BUN SERPL-MCNC: 55 MG/DL — HIGH (ref 8–20)
CALCIUM SERPL-MCNC: 9.3 MG/DL — SIGNIFICANT CHANGE UP (ref 8.4–10.5)
CHLORIDE SERPL-SCNC: 100 MMOL/L — SIGNIFICANT CHANGE UP (ref 96–108)
CO2 SERPL-SCNC: 21 MMOL/L — LOW (ref 22–29)
CREAT SERPL-MCNC: 6.38 MG/DL — HIGH (ref 0.5–1.3)
EGFR: 9 ML/MIN/1.73M2 — LOW
EGFR: 9 ML/MIN/1.73M2 — LOW
GAS PNL BLDA: SIGNIFICANT CHANGE UP
GLUCOSE SERPL-MCNC: 79 MG/DL — SIGNIFICANT CHANGE UP (ref 70–99)
HCT VFR BLD CALC: 22.9 % — LOW (ref 39–50)
HCT VFR BLD CALC: 30.4 % — LOW (ref 39–50)
HCT VFR BLD CALC: 30.6 % — LOW (ref 39–50)
HGB BLD-MCNC: 10 G/DL — LOW (ref 13–17)
HGB BLD-MCNC: 7.5 G/DL — LOW (ref 13–17)
HGB BLD-MCNC: 9.9 G/DL — LOW (ref 13–17)
IMMATURE PLATELET FRACTION #: 2.3 K/UL — LOW (ref 3.9–12.5)
IMMATURE PLATELET FRACTION %: 2.8 % — SIGNIFICANT CHANGE UP (ref 1.6–7.1)
INR BLD: 1.3 RATIO — HIGH (ref 0.85–1.16)
INR BLD: 1.52 RATIO — HIGH (ref 0.85–1.16)
LACTATE SERPL-SCNC: 0.7 MMOL/L — SIGNIFICANT CHANGE UP (ref 0.5–2)
MAGNESIUM SERPL-MCNC: 2.1 MG/DL — SIGNIFICANT CHANGE UP (ref 1.6–2.6)
MCHC RBC-ENTMCNC: 30.5 PG — SIGNIFICANT CHANGE UP (ref 27–34)
MCHC RBC-ENTMCNC: 30.6 PG — SIGNIFICANT CHANGE UP (ref 27–34)
MCHC RBC-ENTMCNC: 31.5 PG — SIGNIFICANT CHANGE UP (ref 27–34)
MCHC RBC-ENTMCNC: 32.4 G/DL — SIGNIFICANT CHANGE UP (ref 32–36)
MCHC RBC-ENTMCNC: 32.8 G/DL — SIGNIFICANT CHANGE UP (ref 32–36)
MCHC RBC-ENTMCNC: 32.9 G/DL — SIGNIFICANT CHANGE UP (ref 32–36)
MCV RBC AUTO: 93 FL — SIGNIFICANT CHANGE UP (ref 80–100)
MCV RBC AUTO: 93.1 FL — SIGNIFICANT CHANGE UP (ref 80–100)
MCV RBC AUTO: 97.5 FL — SIGNIFICANT CHANGE UP (ref 80–100)
NRBC # BLD AUTO: 0 K/UL — SIGNIFICANT CHANGE UP (ref 0–0)
NRBC # FLD: 0 K/UL — SIGNIFICANT CHANGE UP (ref 0–0)
NRBC BLD AUTO-RTO: 0 /100 WBCS — SIGNIFICANT CHANGE UP (ref 0–0)
PHOSPHATE SERPL-MCNC: 5.6 MG/DL — HIGH (ref 2.4–4.7)
PLATELET # BLD AUTO: 134 K/UL — LOW (ref 150–400)
PLATELET # BLD AUTO: 150 K/UL — SIGNIFICANT CHANGE UP (ref 150–400)
PLATELET # BLD AUTO: 83 K/UL — LOW (ref 150–400)
PMV BLD: 10.1 FL — SIGNIFICANT CHANGE UP (ref 7–13)
PMV BLD: 10.9 FL — SIGNIFICANT CHANGE UP (ref 7–13)
PMV BLD: 11.2 FL — SIGNIFICANT CHANGE UP (ref 7–13)
POTASSIUM SERPL-MCNC: 4.3 MMOL/L — SIGNIFICANT CHANGE UP (ref 3.5–5.3)
POTASSIUM SERPL-SCNC: 4.3 MMOL/L — SIGNIFICANT CHANGE UP (ref 3.5–5.3)
PROT SERPL-MCNC: 5.7 G/DL — LOW (ref 6.6–8.7)
PROTHROM AB SERPL-ACNC: 15 SEC — HIGH (ref 9.9–13.4)
PROTHROM AB SERPL-ACNC: 17.6 SEC — HIGH (ref 9.9–13.4)
RBC # BLD: 2.46 M/UL — LOW (ref 4.2–5.8)
RBC # BLD: 3.14 M/UL — LOW (ref 4.2–5.8)
RBC # BLD: 3.27 M/UL — LOW (ref 4.2–5.8)
RBC # FLD: 15.7 % — HIGH (ref 10.3–14.5)
RBC # FLD: 15.9 % — HIGH (ref 10.3–14.5)
RBC # FLD: 17.5 % — HIGH (ref 10.3–14.5)
SODIUM SERPL-SCNC: 137 MMOL/L — SIGNIFICANT CHANGE UP (ref 135–145)
WBC # BLD: 6.6 K/UL — SIGNIFICANT CHANGE UP (ref 3.8–10.5)
WBC # BLD: 6.72 K/UL — SIGNIFICANT CHANGE UP (ref 3.8–10.5)
WBC # BLD: 8.17 K/UL — SIGNIFICANT CHANGE UP (ref 3.8–10.5)
WBC # FLD AUTO: 6.6 K/UL — SIGNIFICANT CHANGE UP (ref 3.8–10.5)
WBC # FLD AUTO: 6.72 K/UL — SIGNIFICANT CHANGE UP (ref 3.8–10.5)
WBC # FLD AUTO: 8.17 K/UL — SIGNIFICANT CHANGE UP (ref 3.8–10.5)

## 2025-05-23 PROCEDURE — 93010 ELECTROCARDIOGRAM REPORT: CPT

## 2025-05-23 PROCEDURE — 99233 SBSQ HOSP IP/OBS HIGH 50: CPT | Mod: GC,57

## 2025-05-23 PROCEDURE — 99233 SBSQ HOSP IP/OBS HIGH 50: CPT

## 2025-05-23 PROCEDURE — 71045 X-RAY EXAM CHEST 1 VIEW: CPT | Mod: 26

## 2025-05-23 PROCEDURE — 75574 CT ANGIO HRT W/3D IMAGE: CPT | Mod: 26

## 2025-05-23 PROCEDURE — 75635 CT ANGIO ABDOMINAL ARTERIES: CPT | Mod: 26

## 2025-05-23 PROCEDURE — 99291 CRITICAL CARE FIRST HOUR: CPT

## 2025-05-23 PROCEDURE — 99232 SBSQ HOSP IP/OBS MODERATE 35: CPT

## 2025-05-23 PROCEDURE — 35860 EXPLORE LIMB VESSELS: CPT | Mod: 82,GC

## 2025-05-23 PROCEDURE — 93931 UPPER EXTREMITY STUDY: CPT | Mod: 26,RT

## 2025-05-23 DEVICE — SYS PLUS TRANSCAROTID NEUROPROTECTION: Type: IMPLANTABLE DEVICE | Status: FUNCTIONAL

## 2025-05-23 DEVICE — BLLN STERLING MONORAIL 5X30MMX135CM: Type: IMPLANTABLE DEVICE | Status: FUNCTIONAL

## 2025-05-23 DEVICE — CLIP APPLIER ETHICON LIGACLIP 11.5" MEDIUM: Type: IMPLANTABLE DEVICE | Status: FUNCTIONAL

## 2025-05-23 DEVICE — CLIP APPLIER COVIDIEN SURGICLIP 11.5" MEDIUM: Type: IMPLANTABLE DEVICE | Status: FUNCTIONAL

## 2025-05-23 DEVICE — GUIDEWIRE BENTSON 0.035 X 180CM: Type: IMPLANTABLE DEVICE | Status: FUNCTIONAL

## 2025-05-23 DEVICE — CLIP APPLIER COVIDIEN SURGICLIP III 9" SM: Type: IMPLANTABLE DEVICE | Status: FUNCTIONAL

## 2025-05-23 DEVICE — KIT INTRODUCER MICRO 21GAX7CM 7FR: Type: IMPLANTABLE DEVICE | Status: FUNCTIONAL

## 2025-05-23 DEVICE — SURGIFOAM 8 X 12.5CM X 10MM (100): Type: IMPLANTABLE DEVICE | Status: FUNCTIONAL

## 2025-05-23 DEVICE — PATCH  BOVINE PERICARDIAL 0.8CM X 8CM: Type: IMPLANTABLE DEVICE | Status: FUNCTIONAL

## 2025-05-23 DEVICE — GWIRE ENROUTE 0.014X95CM: Type: IMPLANTABLE DEVICE | Status: FUNCTIONAL

## 2025-05-23 DEVICE — CLIP APPLIER COVIDIEN SURGICLIP 13" LARGE: Type: IMPLANTABLE DEVICE | Status: FUNCTIONAL

## 2025-05-23 RX ORDER — AMLODIPINE BESYLATE 10 MG/1
10 TABLET ORAL AT BEDTIME
Refills: 0 | Status: DISCONTINUED | OUTPATIENT
Start: 2025-05-23 | End: 2025-05-23

## 2025-05-23 RX ORDER — CEFAZOLIN SODIUM IN 0.9 % NACL 3 G/100 ML
INTRAVENOUS SOLUTION, PIGGYBACK (ML) INTRAVENOUS
Refills: 0 | Status: DISCONTINUED | OUTPATIENT
Start: 2025-05-23 | End: 2025-05-23

## 2025-05-23 RX ORDER — CEFAZOLIN SODIUM IN 0.9 % NACL 3 G/100 ML
1000 INTRAVENOUS SOLUTION, PIGGYBACK (ML) INTRAVENOUS ONCE
Refills: 0 | Status: COMPLETED | OUTPATIENT
Start: 2025-05-23 | End: 2025-05-23

## 2025-05-23 RX ORDER — CARVEDILOL 3.12 MG/1
6.25 TABLET, FILM COATED ORAL EVERY 12 HOURS
Refills: 0 | Status: DISCONTINUED | OUTPATIENT
Start: 2025-05-23 | End: 2025-06-03

## 2025-05-23 RX ORDER — HYDROMORPHONE/SOD CHLOR,ISO/PF 2 MG/10 ML
0.5 SYRINGE (ML) INJECTION ONCE
Refills: 0 | Status: DISCONTINUED | OUTPATIENT
Start: 2025-05-23 | End: 2025-05-23

## 2025-05-23 RX ORDER — LEVETIRACETAM 10 MG/ML
500 INJECTION, SOLUTION INTRAVENOUS
Refills: 0 | Status: DISCONTINUED | OUTPATIENT
Start: 2025-05-23 | End: 2025-06-03

## 2025-05-23 RX ORDER — EPOETIN ALFA 10000 [IU]/ML
10000 SOLUTION INTRAVENOUS; SUBCUTANEOUS
Refills: 0 | Status: DISCONTINUED | OUTPATIENT
Start: 2025-05-23 | End: 2025-05-23

## 2025-05-23 RX ORDER — FUROSEMIDE 10 MG/ML
120 INJECTION INTRAMUSCULAR; INTRAVENOUS ONCE
Refills: 0 | Status: COMPLETED | OUTPATIENT
Start: 2025-05-23 | End: 2025-05-23

## 2025-05-23 RX ORDER — FENTANYL CITRATE-0.9 % NACL/PF 100MCG/2ML
25 SYRINGE (ML) INTRAVENOUS ONCE
Refills: 0 | Status: DISCONTINUED | OUTPATIENT
Start: 2025-05-23 | End: 2025-05-23

## 2025-05-23 RX ORDER — TAMSULOSIN HYDROCHLORIDE 0.4 MG/1
0.4 CAPSULE ORAL AT BEDTIME
Refills: 0 | Status: DISCONTINUED | OUTPATIENT
Start: 2025-05-23 | End: 2025-06-03

## 2025-05-23 RX ORDER — LEVETIRACETAM 10 MG/ML
500 INJECTION, SOLUTION INTRAVENOUS
Refills: 0 | Status: DISCONTINUED | OUTPATIENT
Start: 2025-05-23 | End: 2025-05-23

## 2025-05-23 RX ORDER — PROPOFOL 10 MG/ML
20 INJECTION, EMULSION INTRAVENOUS
Qty: 1000 | Refills: 0 | Status: DISCONTINUED | OUTPATIENT
Start: 2025-05-23 | End: 2025-05-24

## 2025-05-23 RX ORDER — FUROSEMIDE 10 MG/ML
120 INJECTION INTRAMUSCULAR; INTRAVENOUS ONCE
Refills: 0 | Status: DISCONTINUED | OUTPATIENT
Start: 2025-05-23 | End: 2025-05-23

## 2025-05-23 RX ORDER — HYDROMORPHONE/SOD CHLOR,ISO/PF 2 MG/10 ML
1 SYRINGE (ML) INJECTION DAILY
Refills: 0 | Status: DISCONTINUED | OUTPATIENT
Start: 2025-05-23 | End: 2025-05-23

## 2025-05-23 RX ADMIN — SACUBITRIL AND VALSARTAN 1 TABLET(S): 49; 51 TABLET, FILM COATED ORAL at 05:05

## 2025-05-23 RX ADMIN — Medication 1 APPLICATION(S): at 05:03

## 2025-05-23 RX ADMIN — Medication 0.5 MILLIGRAM(S): at 11:46

## 2025-05-23 RX ADMIN — Medication 1 MILLIGRAM(S): at 14:22

## 2025-05-23 RX ADMIN — Medication 1000 MILLIGRAM(S): at 21:43

## 2025-05-23 RX ADMIN — Medication 10 MILLIGRAM(S): at 20:25

## 2025-05-23 RX ADMIN — Medication 20 MILLIGRAM(S): at 05:06

## 2025-05-23 RX ADMIN — Medication 0.5 MILLIGRAM(S): at 12:51

## 2025-05-23 RX ADMIN — Medication 0.5 MILLIGRAM(S): at 13:20

## 2025-05-23 RX ADMIN — Medication 40 MILLIGRAM(S): at 05:05

## 2025-05-23 RX ADMIN — CARVEDILOL 12.5 MILLIGRAM(S): 3.12 TABLET, FILM COATED ORAL at 05:03

## 2025-05-23 RX ADMIN — HEPARIN SODIUM 5000 UNIT(S): 1000 INJECTION INTRAVENOUS; SUBCUTANEOUS at 05:04

## 2025-05-23 RX ADMIN — Medication 4 MILLIGRAM(S): at 15:00

## 2025-05-23 RX ADMIN — PROPOFOL 7.62 MICROGRAM(S)/KG/MIN: 10 INJECTION, EMULSION INTRAVENOUS at 19:48

## 2025-05-23 RX ADMIN — Medication 100 MILLIGRAM(S): at 05:05

## 2025-05-23 RX ADMIN — LEVETIRACETAM 500 MILLIGRAM(S): 10 INJECTION, SOLUTION INTRAVENOUS at 21:43

## 2025-05-23 RX ADMIN — Medication 0.5 MILLIGRAM(S): at 12:15

## 2025-05-23 RX ADMIN — FUROSEMIDE 124 MILLIGRAM(S): 10 INJECTION INTRAMUSCULAR; INTRAVENOUS at 20:31

## 2025-05-23 RX ADMIN — LEVETIRACETAM 500 MILLIGRAM(S): 10 INJECTION, SOLUTION INTRAVENOUS at 05:05

## 2025-05-23 NOTE — PROGRESS NOTE ADULT - ASSESSMENT
63 yo M with PMH with hx of ESRD on HD, A. fib s/p ablation, LAAO device, CAD s/p CABG x2, bioprosthetic AVR/MR repair, MV endocarditis 2023, type A aortic dissection s/p repair w/ ischemic bowel s/p resection, hx substance abuse, presenting with shortness of breath.    ESRD: CHF r/o cardiac event  HTN  - HD today is on a MWF schedule==> will eventually need to transition back to outpt TTS schedule  - cont current antihypertensive regimen including Torsemide  -  s/p RHC/ LHC    Anemia: +CKD; Tsat 20%  - added REINALDO at HD  - may need PRBCs    RO: serum phos ok  - low phos diet  - monitor off binders for now    Will follow    Will follow

## 2025-05-23 NOTE — PROVIDER CONTACT NOTE (CHANGE IN STATUS NOTIFICATION) - ASSESSMENT
R groin site assessed this morning at 0800, soft non-tender, no bleeding or hematoma noted; now with new onset sudden large R groin hematoma present.  As per patient he was lying in bed after returning back from CT scan and felt a sudden pain and lump in his right groin and notified RN

## 2025-05-23 NOTE — CONSULT NOTE ADULT - ASSESSMENT
63 y/o M with significant PMH ESRD s/p renal transplant on HD Tu/Th/Sat, type A aortic dissection repair (2010), ischemic bowel resection, CAD s/p CABG x 2 (SVG-OM1and SVG-RPDA), bioprosthetic AVR/MVR (2020 Dr. Butler), Afib s/p ablation (2021), LAAO device (8/2024), MV endocarditis (2023). Due to a new drop in his EF w LV dysfunction, the patient underwent LHC/RHC 5/22. A R groin hematoma was found 5/23 AM. The patient underwent CTA with abd runoff to LE showing active extravasation into hematoma site. The patient was taken to OR by Vascular Surgery and the bleed was controlled, sutured. The patient was intubated during surgery. Due to fluid overload, he also requires aggressive diuresis. Patient to be admitted to MICU further management.    Plan  -Plan for HD tomorrow, discussed with Dr. Dutta  -Monitor airway, maintain sedation, goal SpO2>92%  -Monitor surgical site for bleeding, drainage, increase in size of hematoma  -Cardiology and surgery following, appreciate recs.

## 2025-05-23 NOTE — PROVIDER CONTACT NOTE (CHANGE IN STATUS NOTIFICATION) - ACTION/TREATMENT ORDERED:
direct manual pressure held until hematoma resolved, pain medication given, CBC drawn, fem stop applied

## 2025-05-23 NOTE — PROGRESS NOTE ADULT - ASSESSMENT
65 y/o M with significant PMH ESRD s/p renal transplant on HD Tu/Th/Sat, type A aortic dissection repair (2010), ischemic bowel resection, CAD s/p CABG x 2 (SVG-OM1  and SVG-RPDA), bioprosthetic AVR/MVR (2020 Dr. Bulter), Afib s/p ablation (2021), LAAO device (8/2024), MV endocarditis (2023), remote history of substance abuse and former smoker who presented with c/o shortness of breath.    TTE this admission revealed newly reduced LV systolic function 35-40%. LHC was attempted but unable to pass through aortic root in view of prior type A dissection repair. RHC showed mildly elevated filling pressures and increased CO (of note, pt has AVF graft).      Cardiac Testing:  TTE 5/19/25: LVEF 35-40%, LVIDD 4.6cm, LVOT VTI 15.9cm, moderate to severe LVH, normal RV size/function, well seated normal functioning bioprosthetic aortic valve, well seated normal functioning bioprosthetic mitral valve with trace intravalvular regurgitation, PASP 83 mmHg (RAP 15 mmHg).     TTE 4/14/25: LVEF 65-70%, LVIDd 4.9cm, LVOT VTI 32.6cm, normal RV size/function, mild JESSICA, bioprosthetic aortic valve with no AS and trace intravalvular regurgitation, bioprosthetic mitral valve with trace intravalvular regurgitation (PG14.9 mmHg, MG 2.77 mmHg), mild to moderate TR, PASP 83 mmHg (RAP 15 mmHg). Aortic root dilated 4.32 cm. No pericardial effusion.     TTE 12/4/24: LVEF 50-55%, LVIDD 4.4cm, LVOT VTI 17cm, moderate LVH, RVE with reduced RV systolic function, bioprosthetic aortic valve w/ trace intravalvular regurgitation (PV 2.24 m/s, PG 20.1 mmHg, MG11.0 mmHg w/ LVOT/aortic valve VTI ratio 0.40. ALEC 1.50 cm² by the continuity equation), bioprosthetic mitral valve with normal function (PG 14.4 mmHg, MG 7.00 mmHg at a heart rate of 103 bpm), moderate TR, PASP 55 mmHg (RAP 15 mmHg). Presence of abdominal ascites incidentally noted. No pericardial effusion.

## 2025-05-23 NOTE — CHART NOTE - NSCHARTNOTEFT_GEN_A_CORE
Interventional cardiology ACP chart note    Call received from cardiology team to evaluate the pt at bedside for RT groin hematoma  Patient s/p diagnostic cath via RFA access, sealed with angio seal yesterday.    Evaluated the patient at bedside, patient states that he had mild groin discomfort overnight, today morning he started  to have severe pain rt groin and he felt a big bump        Upon my arrival, pt lying in bed, RN compressing rt groin hematoma,  noted large hematoma adjacent to cath site,  expanding to upper outer and medial thigh, and subsequently hematoma compressed by authorable to compressed out hematoma  Patient expressing severe pain   at hematoma site  RUE warm, perfusing, no bleeding, distal pulses strong,, Sensation intact   , VSS  Post manual  compression of hematoma for 30 minutes, un inflated fem stop applied for  2 hours,.       VS, RT GROIN check NV status q 30 minutes while fem stop in place  Plan for US rt LE  arterial today once hematoma get stabilized   Stat Cbc resulted 9.9/30, mirroring baseline hb/hct  Discussed with DR Frankel, DR. MILLS, and RN                                                                                                                                                                                                                                                                                                                   Vital Signs Last 24 Hrs  T(C): 36.7 (23 May 2025 08:00), Max: 36.7 (23 May 2025 08:00)  T(F): 98 (23 May 2025 08:00), Max: 98 (23 May 2025 08:00)  HR: 65 (23 May 2025 12:25) (60 - 72)  BP: 127/71 (23 May 2025 12:25) (127/62 - 177/72)  BP(mean): 90 (23 May 2025 12:25) (90 - 107)  RR: 18 (23 May 2025 08:00) (14 - 18)  SpO2: 98% (23 May 2025 08:00) (95% - 98%)    Parameters below as of 23 May 2025 08:00  Patient On (Oxygen Delivery Method): room air                            9.9    6.72  )-----------( 150      ( 23 May 2025 11:36 )             30.6

## 2025-05-23 NOTE — CHART NOTE - NSCHARTNOTEFT_GEN_A_CORE
Post-op check:    patient is a 63 yo M s/p R groin exploration     Vitals:ICU Vital Signs Last 24 Hrs  T(C): 35.8 (23 May 2025 23:45), Max: 36.7 (23 May 2025 08:00)  T(F): 96.4 (23 May 2025 23:45), Max: 98 (23 May 2025 08:00)  HR: 75 (23 May 2025 23:45) (60 - 77)  BP: 96/50 (23 May 2025 23:45) (96/50 - 212/81)  BP(mean): 65 (23 May 2025 23:45) (65 - 123)  ABP: 100/97 (23 May 2025 19:45) (100/97 - 237/83)  ABP(mean): 327 (23 May 2025 19:45) (87 - 327)  RR: 12 (23 May 2025 23:45) (12 - 23)  SpO2: 100% (23 May 2025 23:45) (95% - 100%)    O2 Parameters below as of 23 May 2025 18:50  Patient On (Oxygen Delivery Method): ventilator    O2 Concentration (%): 40        Physical exam:  General: patient intubated and sedated   Resp: equal chest rise bilaterally, nonlabored breathing  Cardio: RRR  Abdomen: soft, NT, ND, surgical site c/d/i   Extremities: moving all extremities spontaneously, palpable DP and PT pulses RLE          Plan:   Sub Q hep and ASA 81mg ok   ancef 24h   continue to monitor pulses

## 2025-05-23 NOTE — CONSULT NOTE ADULT - ASSESSMENT
ASSESSMENT: Patient is a 64y old male POD 1 from [facility], now with right groin expanding hematoma.     PLAN:    - f/u CTA Angio A/P  - OR today  - Plan discussed with Attending, Dr. Liriano

## 2025-05-23 NOTE — PROGRESS NOTE ADULT - SUBJECTIVE AND OBJECTIVE BOX
NEPHROLOGY INTERVAL HPI/OVERNIGHT EVENTS:    Examined earlier  Laying flat no dyspnea  c/o pain    MEDICATIONS  (STANDING):  aspirin enteric coated 81 milliGRAM(s) Oral daily  atorvastatin 40 milliGRAM(s) Oral at bedtime  carvedilol 12.5 milliGRAM(s) Oral every 12 hours  chlorhexidine 2% Cloths 1 Application(s) Topical <User Schedule>  epoetin yolanda-epbx (RETACRIT) Injectable 44117 Unit(s) IV Push <User Schedule>  heparin   Injectable 5000 Unit(s) SubCutaneous every 8 hours  hydrALAZINE 100 milliGRAM(s) Oral two times a day  isosorbide   mononitrate ER Tablet (IMDUR) 60 milliGRAM(s) Oral daily  levETIRAcetam 500 milliGRAM(s) Oral two times a day  pantoprazole    Tablet 40 milliGRAM(s) Oral before breakfast  sacubitril 24 mG/valsartan 26 mG 1 Tablet(s) Oral two times a day  tamsulosin 0.4 milliGRAM(s) Oral at bedtime  torsemide 20 milliGRAM(s) Oral daily    MEDICATIONS  (PRN):  acetaminophen     Tablet .. 650 milliGRAM(s) Oral every 6 hours PRN Temp greater or equal to 38C (100.4F), Mild Pain (1 - 3)  melatonin 3 milliGRAM(s) Oral at bedtime PRN Insomnia  ondansetron Injectable 4 milliGRAM(s) IV Push every 8 hours PRN Nausea and/or Vomiting      Allergies    penicillin (Other)    Intolerances        Vital Signs Last 24 Hrs  T(C): 36.7 (23 May 2025 08:00), Max: 36.7 (23 May 2025 08:00)  T(F): 98 (23 May 2025 08:00), Max: 98 (23 May 2025 08:00)  HR: 65 (23 May 2025 12:25) (60 - 72)  BP: 127/71 (23 May 2025 12:25) (127/62 - 177/72)  BP(mean): 90 (23 May 2025 12:25) (90 - 107)  RR: 18 (23 May 2025 08:00) (14 - 18)  SpO2: 98% (23 May 2025 08:00) (95% - 98%)    Parameters below as of 23 May 2025 08:00  Patient On (Oxygen Delivery Method): room air      Daily Height in cm: 172.72 (23 May 2025 04:24)    Daily Weight in k.5 (23 May 2025 04:24)    PHYSICAL EXAM:  GENERAL: Appears chronically ill  HEENT: Moist MMs  NECK: Supple, No JVD  NERVOUS SYSTEM:  A/O x3  CHEST: Clear, diminished BS  HEART:  RRR, no rub  ABDOMEN: Soft, NT/ND BS+  EXTREMITIES: Tr dependent edema    LABS:                        9.9    6.72  )-----------( 150      ( 23 May 2025 11:36 )             30.6                       RADIOLOGY & ADDITIONAL TESTS:

## 2025-05-23 NOTE — CONSULT NOTE ADULT - ATTENDING COMMENTS
Patient with expanding right groin hematoma after LHC. I have personally reviewed his CTA which demonstrates active extravasation from femoral artery. Will proceed to OR for emergent groin exploration and repair of femoral artery.
64 M w/ ESRD s/p failed renal transplant on HD T/T/S, type A aortic dissection repair (2010), Ischemic bowel resection, CAD s/p CABG x 2, bioprosthetic AVR/MR (2020 Dr. Butler), Afib s/p ablation (2021), LAAO device (8/2024), MV endocarditis in 2023, admitted to hospitalist service for decompensated HFrEF 35-40%. Was unable to perform LHC due to inability to pass catheters through aortic root in view of prior type A dissection repair. CO was normal on RHC, elevated filling prressures (PA mean 35, PCWP 17, CI 5.6 L/min). On GDMT. After LHC on 5/22, he's been having an expanding hematoma in the R groin site, drop in Hb to 7.5 from 9.9, and developed hypotension and hemorrhagic shock refractory to fem-stop device, thus emergently brought to the OR for exploration w/ vascular surgery, s/p open cut-down and evacuation of 1L of fresh blood, femoral artery behind profunda vein was ligated and transected, 3 mm arteriotomy w/ active bleeding at common femoral artery bifurcation, repaired with suture and cessation of bleeding. Palpation DP/PT on RLE. Left 15 Zac Zhao drain in groin.     Given 4/2/1 prbc/ffp/plt in OR and was left intubated due to concern for respiratory failure and volume overload in setting of blood products resuscitation and now requires transfer to ICU setting. We will arrange for urgent HD with fluid removal w/ nephrology and repeat CBC to evaluate for response. d/w vascular attending Dr. Liriano. Transfer to MICU.

## 2025-05-23 NOTE — CHART NOTE - NSCHARTNOTEFT_GEN_A_CORE
Called by RN for hematoma at groin site   referred to interventional cardiology who responded to bedside and held pressure on groin.   Dilaudid was ordered for pain control as well as labs as per interventional request   discussed with medicine attending Dr. Lopez who also was at bedside.   frequent vitals/groin management as per interventional   Non-invasive cardiology to f/u tomorrow AM

## 2025-05-23 NOTE — BRIEF OPERATIVE NOTE - OPERATION/FINDINGS
Pre-op pulse exam: Palpable DP and PT on RLE    Large expanding hematoma on R groin  Open cut down and evacuation of ~1000cc of fresh blood  Femoral artery identified behind profunda vein that was ligated and transected.   Proximal and distal control obtained, 3mm arteriotomy noted with active bleeding at the common femoral artery bifurcation  Repaired with figure-of-eight prolene with good seal and no further bleeding.     Post repair with good profunda doppler    Post-op pulse exam:  Palpable DP/PT on RLE

## 2025-05-23 NOTE — CONSULT NOTE ADULT - CONSULT REQUESTED DATE/TIME
20-May-2025 14:30
19-May-2025 18:45
23-May-2025 15:01
23-May-2025 18:28
22-May-2025 11:11
22-May-2025 12:30
19-May-2025 15:08

## 2025-05-23 NOTE — CONSULT NOTE ADULT - SUBJECTIVE AND OBJECTIVE BOX
Patient:  JONATHAN GIBSON  [id number]    CHIEF COMPLAINT: Groin hematoma    HPI:  65 yo M with PMH with hx of ESRD on HD, A. fib s/p ablation, LAAO device, CAD s/p CABG x2, bioprosthetic AVR/MR repair, MV endocarditis 2023, type A aortic dissection s/p repair w/ ischemic bowel s/p resection, hx substance abuse, presenting with shortness of breath. Patient reports that these symptoms started yesterday and today he went to a scheduled appointment with his cardiologist, who subsequently referred him to the hospital due to his breathing difficulties. Patient reports dietary and medication non-discretion this past week. Was placed on oxygen. S/p LHC and RHC for new drop in EF, now with surgically controlled bleeding fem artery and R groin hematoma.    MICU consulted for management of patient post-op who is now intubated and requires aggressive diuresis due to fluid overload. The patient was seen and examined, his surgical site is clean, dry, intact. There is a drain in place, no active drainage into collection bag. Patient is still under effects of sedation, not interacting, not alert.     PAST MEDICAL & SURGICAL HISTORY:  CHF (congestive heart failure)      CVA (cerebral vascular accident)      Seizures  last seizure 10 years ago      HTN (hypertension)      Hyperlipemia      COVID-19  october 2020      Atrial fibrillation      Former smoker      History of cocaine use  remote hx, currently sober      H/O aortic dissection  s/p repair (2010), surgery complicated by bowel ischemia s/p bowel resection and ostomy with reversal      H/O aortic valve insufficiency      Mitral regurgitation      GIB (gastrointestinal bleeding)      CAD (coronary artery disease)  s/p PCI 1998  and CABG x 2 11/2020      Chronic atrial fibrillation      Aneurysm of artery of upper extremity      Anemia of chronic disease      End stage renal disease      Myocardial infarction      COVID-19 vaccine series completed      Port-A-Cath in place      H/O colectomy      Status post double vessel coronary artery bypass  11/2020 Dr Butler      S/P AVR (aortic valve replacement)  (t)AVR 11/2020 Dr Butler      S/P MVR (mitral valve replacement)  (t)MVR 11/2020 Dr Butler      H/O aortic dissection  Type A; emergent repair 2010      AV fistula  LUE      History of renal transplant      Presence of Watchman left atrial appendage closure device          FAMILY HISTORY:  FH: hypertension    Family history of cardiac disorder (Mother)  mother        SOCIAL HISTORY:    Allergies    penicillin (Other)    Intolerances        HOME MEDICATIONS:    REVIEW OF SYSTEMS:  Negative other than stated in HPI    ALLERGY AND IMMUNOLOGIC: No hives or eczema    OBJECTIVE:  T(F): 98 (05-23-25 @ 08:00), Max: 98 (05-23-25 @ 08:00)  HR: 62 (05-23-25 @ 14:25) (60 - 72)  BP: 109/65 (05-23-25 @ 14:25) (109/65 - 177/72)  BP(mean): 79 (05-23-25 @ 14:25) (79 - 107)  ABP: --  ABP(mean): --  RR: 18 (05-23-25 @ 08:00) (18 - 18)  SpO2: 98% (05-23-25 @ 08:00) (95% - 98%)  CVP(mm Hg): --    I/O Summary 24H    IN: 240 mL / OUT: 0 mL / NET: 240 mL      CAPILLARY BLOOD GLUCOSE          PHYSICAL EXAM:  GENERAL: NAD, lying in bed, pale  HEAD:  Atraumatic, Normocephalic  EYES: EOMI, PERRLA, conjunctiva and sclera clear  ENT: Moist mucous membranes  NECK: Supple, No JVD  CHEST/LUNG: Intubated, unlabored respirations  HEART: Regular rate and rhythm; No murmurs, rubs, or gallops  ABDOMEN: Bowel sounds present; Soft, Nontender, Nondistended. No hepatomegaly  EXTREMITIES:  2+ Peripheral Pulses, brisk capillary refill. No clubbing, cyanosis, or edema  MSK: large sized R groin hematoma, dressing in place, clean and intact, drain in place, fluctuance noted  SKIN: No rashes or lesions    HOSPITAL MEDICATIONS:  MEDICATIONS  (STANDING):    MEDICATIONS  (PRN):      LABS:  CBC 05-23-25 @ 17:30                        7.5    6.60  )-----------( 83                    22.9     Hgb trend: 7.5 <-- , 9.9 <-- , 9.4 <-- , 9.5 <--   WBC trend: 6.60 <-- , 6.72 <-- , 4.53 <-- , 5.05 <--           Serum Cr (eGFR) trend:     PT/INR - ( 23 May 2025 17:29 )   PT: 17.6 sec;   INR: 1.52 ratio    PTT - ( 23 May 2025 17:29 ):34.1 sec    ABG Trend:   05-23-25 @ 17:27 pH 7.340[L] | pCO2 43 | PO2 288[H] | HCO3 23 | FiO2 --  05-23-25 @ 16:28 pH 7.350 | pCO2 39 | PO2 344[H] | HCO3 22 | FiO2 --    VBG Trend:       MICROBIOLOGY:       RADIOLOGY:  < from: CT Angio Abd Aorta w/run-off w/ IV Cont (05.23.25 @ 15:24) >    ACC: [id number] EXAM:  CT ANGIO ABD AOR W RUN(W)AW IC   ORDERED BY: EMILIANO CHRIS     PROCEDURE DATE:  05/23/2025          INTERPRETATION:  CLINICAL INFORMATION: 64-year-old man, ESRD on   hemodialysis, history type A aortic dissection status post repair.   Expanding hematoma right groin following left heart catheterization    COMPARISON: CT scan 04/28/2025      CT ANGIOGRAM ABDOMEN, PELVIS, AND LOWER EXTREMITIES:    PROCEDURE:  Initially, nonenhanced CT was obtained through the calves. Then,   following the rapid administration of intravenous contrast, CT   angiography was performed through the abdomen, pelvis, and lower   extremities down to the toes.  Delayed images through the calves were   also obtained. Sagittal and coronal reformats as well as 3D   reconstructions were performed.    125 mls of Omnipaque 350 was administered intravenously without   complication and 25 mls were discarded. No complication of intravenous   contrast injection reported    FINDINGS:    CENTRAL ARTERIAL SYSTEM:  Partially visualized aortic dissection with enhancing false lumen. Celiac   axis, SMA, bilateral renal arteries arising from the true lumen.   Miniscule UMA also appears to arise from the true lumen. Dissection   extends into the mid right external iliac artery.        RIGHT LOWER EXTREMITY ARTERIES:    Patent iliac arteries.  Common femoral: Active extravasation into massive groin and proximal   thigh hematoma  Patent femoral and deep femoral arteries.  Patent popliteal artery and three-vessel runoff to the foot      LEFT LOWER EXTREMITY ARTERIES:    Common iliac: Mildly occlusive plaque  Patent external and internal iliac arteries. Common femoral:  Patent deep and superficial femoral arteries:  Popliteal: Patent with three-vessel runoff to the foot      ADDITIONAL FINDINGS:    CHEST: Small left and trace right pleural effusion, AVR/MVR repair  Abdomen and pelvis:  Reflux of contrast into IVC and hepatic veins suggesting right-sided   failure.  Small ascites.  Enhancing right renal lesion 2.1 cm consistent with neoplasm.  Subtotal colectomy with ileorectal anastomosis. Small bowel anastomosis.  Sternotomy.      IMPRESSION:    Massive right groin and proximal thigh hematoma without active bleeding   presumably from common femoral artery.    Bilateral three-vessel runoff.    Enhancing right renal lesion consistent with neoplasm. Recommend   follow-up CT with renal protocol.    The patient had been transported to the OR prior to submission of the CT   report.    --- End of Report ---            DANIEL SOTELO MD; Attending Radiologist  This document has been electronically signed. May 23 2025  6:21PM    < end of copied text >      EKG

## 2025-05-23 NOTE — PROGRESS NOTE ADULT - PROBLEM SELECTOR PLAN 1
LVEF 35-40%, LVIDd 4.6cm, normal RV size/fx  Etiology: Unable to obtain [facility]. S/p CTA this AM (results pending).  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 Device interrogation showed AP 28%.  GDMT: Continue Coreg 12.5 mg BID, Entresto 24-26mg BID (first dose tonight), C/w Imdur 60 mg daily, HDZN 100 mg BID for now.  Diuretics: Torsemide 20mg PO daily  Needs ongoing HTN management.  Aggressive fluid removal via additional HD sessions while admitted.  Low Na, 1.5L FR diet recommended.  Compliance with HD sessions advised.  Multiple readmissions. May consider CardioMEMS however patient would have to be compliant with transmitting daily readings. LVEF 35-40%, LVIDd 4.6cm, normal RV size/fx  Etiology: Unable to obtain [facility]. S/p CTA this AM (results pending).  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 Device interrogation showed AP 28%.  GDMT: Continue Coreg 12.5 mg BID, Entresto 24-26mg BID, Imdur 60 mg daily, and HDZN 100 mg BID.  Diuretics: Torsemide 20mg PO daily  Needs ongoing HTN management.  Aggressive fluid removal via additional HD sessions while admitted.  Low Na, 1.5L FR diet recommended.  Compliance with HD sessions advised.  Multiple readmissions. May consider CardioMEMS however patient would have to be compliant with transmitting daily readings.

## 2025-05-23 NOTE — PROGRESS NOTE ADULT - ASSESSMENT
63 yo M with PMH with hx of ESRD on HD, A. fib s/p ablation, LAAO device, CAD s/p CABG x2, bioprosthetic AVR/MR repair, MV endocarditis 2023, type A aortic dissection s/p repair w/ ischemic bowel s/p resection, hx substance abuse, presenting with shortness of breath. Patient reports that these symptoms started yesterday and today he went to a scheduled appointment with his cardiologist, who subsequently referred him to the hospital due to his breathing difficulties. Patient reports dietary and medication non-discretion this past week. Was placed on oxygen. Admitted to medicine for further management.     Right Groin Hematoma  - Large right groin hematoma present  - Fem stop placed  - H/H stable  - US ordered     Acute hypoxic respiratory failure secondary to fluid overload HFrEF    - TTE with new drop in EF   - s/p RHC/LHC   - Cardiology recs appreciated  - Hydralazine 100mg BID  - Imdur 60mg daily  - Torsemide 20mg daily     Anemia of cronic kidney disease   - s/p blood tx with HD 5/22  - H/H stable    ESRD on HD  - HD per Nephro    Hypertension  - Hydralazine 100mg BID  - Imdur 60mg daily  - Torsemide 20mg daily     h/o CAD, CABG  - Aspirin 81mg daily  - Coreg 12.5mg q12h  - Hydralazine 100mg BID  - Imdur 60mg daily  - Torsemide 20mg daily    Seizure Disorder  - Keppra 500mg BID    DVT ppx  - Heparin SQ    Dispo: Medically acute, DC home 1-2 days

## 2025-05-23 NOTE — CONSULT NOTE ADULT - SUBJECTIVE AND OBJECTIVE BOX
ACUTE CARE SURGERY CONSULT     HPI: 65 yo M with PMH with hx of ESRD on HD, A. fib s/p ablation, LAAO device, CAD s/p CABG x2, bioprosthetic AVR/MR repair, MV endocarditis 2023, type A aortic dissection s/p repair w/ ischemic bowel s/p resection, hx substance abuse, presenting with shortness of breath. Patient reports that these symptoms started yesterday and today he went to a scheduled appointment with his cardiologist, who subsequently referred him to the hospital due to his breathing difficulties. Patient reports dietary and medication non-discretion this past week. Was placed on oxygen. Admitted to medicine for further management.  Yesterday he got a [facility] with no intervention by right groin access with 5F sheath.     Vascular consulted today for expanding hematoma that was noticed after the patient went for CT scan. Patient is complaining of severe right groin pain.     ROS: 10-system review is otherwise negative except HPI above.      PAST MEDICAL & SURGICAL HISTORY:  CHF (congestive heart failure)  CVA (cerebral vascular accident)  Seizures  last seizure 10 years ago  HTN (hypertension)  Hyperlipemia  COVID-19  october 2020  Atrial fibrillation  Former smoker  History of cocaine use  remote hx, currently sober  H/O aortic dissection  s/p repair (2010), surgery complicated by bowel ischemia s/p bowel resection and ostomy with reversal  H/O aortic valve insufficiency  Mitral regurgitation  GIB (gastrointestinal bleeding)  CAD (coronary artery disease)  s/p PCI 1998  and CABG x 2 11/2020  Chronic atrial fibrillation  Aneurysm of artery of upper extremity  Anemia of chronic disease  End stage renal disease  Myocardial infarction  COVID-19 vaccine series completed  Port-A-Cath in place  H/O colectomy  Status post double vessel coronary artery bypass  11/2020 Dr Butler  S/P AVR (aortic valve replacement)  (t)AVR 11/2020 Dr Butler  S/P MVR (mitral valve replacement)  (t)MVR 11/2020 Dr Butler  H/O aortic dissection  Type A; emergent repair 2010  AV fistula  LUE  History of renal transplant  Presence of Watchman left atrial appendage closure device    FAMILY HISTORY:  FH: hypertension  Family history of cardiac disorder (Mother)  mother    SOCIAL HISTORY:  Denies any toxic habits    ALLERGIES: NKA penicillin (Other)    HOME MEDICATIONS:   amLODIPine 10 mg oral tablet: 1 tab(s) orally once a day (19 May 2025 14:47)  aspirin 81 mg oral delayed release tablet: 1 tab(s) orally once a day (19 May 2025 15:22)  atorvastatin 40 mg oral tablet: 1 tab(s) orally once a day (at bedtime) (19 May 2025 14:47)  carvedilol 6.25 mg oral tablet: 1 tab(s) orally 2 times a day (19 May 2025 14:47)  cloNIDine 0.3 mg oral tablet: 1 tab(s) orally 2 times a day (19 May 2025 14:47)  hydrALAZINE 100 mg oral tablet: 1 tab(s) orally 2 times a day (19 May 2025 14:47)  isosorbide mononitrate 60 mg oral tablet, extended release: 1 tab(s) orally once a day (19 May 2025 14:47)  Keppra 500 mg oral tablet: 1 tab(s) orally 2 times a day (19 May 2025 17:07)  Lomotil 2.5 mg-0.025 mg oral tablet: 1 tab(s) orally every 6 hours (19 May 2025 14:47)  tamsulosin 0.4 mg oral capsule: 1 cap(s) orally once a day (at bedtime) (19 May 2025 14:47)  torsemide 20 mg oral tablet: 1 tab(s) orally once a day (19 May 2025 14:47)  --------------------------------------------------------------------------------------------    PHYSICAL EXAM:   General: NAD, Lying in bed comfortably  Neuro: A+Ox3  HEENT: EOMI, PERRLA, MMM  Cardio: RRR  Resp: Non labored breathing on RA  GI/Abd: Soft, NT/ND, no rebound/guarding, no masses palpated. Right expanding hard groin hematoma.   Vascular: All 4 extremities warm and well perfused. Palpable right PT and DP  Pelvis: stable  Musculoskeletal: All 4 extremities moving spontaneously, no limitations, no spinal tenderness.  --------------------------------------------------------------------------------------------    LABS                 9.9    6.72   )----------(  150       ( 23 May 2025 11:36 )               30.6        --------------------------------------------------------------------------------------------  IMAGING

## 2025-05-23 NOTE — PROGRESS NOTE ADULT - SUBJECTIVE AND OBJECTIVE BOX
Chief complaint: SOB    Patient seen and examined at bedside. No acute overnight events reported. This afternoon patient with groin pain, large hematoma present. Cardiology called to bedside, fem stop placed. No fever, chills, chest pain or shortness of breath.     Vital Signs Last 24 Hrs  T(F): 98 (23 May 2025 08:00), Max: 98 (23 May 2025 08:00)  HR: 60 (23 May 2025 11:55) (60 - 72)  BP: 143/71 (23 May 2025 11:55) (127/62 - 177/72)  RR: 18 (23 May 2025 08:00) (13 - 18)  SpO2: 98% (23 May 2025 08:00) (95% - 98%)    Physical Exam:  Constitutional: alert and oriented, in no acute distress   Neck: Soft and supple  Respiratory: Clear to auscultation bilaterally  Cardiovascular: Regular rate and rhyhtm  Gastrointestinal: Soft, non-tender to palpation, +bs  Vascular: 2+ peripheral pulses  Neurological: A/O x 3  Musculoskeletal: no lower extremity edema bilaterally    Labs:                        9.9    6.72  )-----------( 150      ( 23 May 2025 11:36 )             30.6

## 2025-05-23 NOTE — PROGRESS NOTE ADULT - SUBJECTIVE AND OBJECTIVE BOX
ADVANCED HEART FAILURE - PROGRESS NOTE  [address] [place] [address]  Office Phone: [phone]/Fax: [phone]  Service/On Call Phone [phone]  _______________________________________________________________________________________________________    Subjective:  - NAEO  - States he feels well and denies any SOB, orthopnea, PND, CP, palpitations, LH/dizziness.    Medications:  acetaminophen     Tablet .. 650 milliGRAM(s) Oral every 6 hours PRN  aspirin enteric coated 81 milliGRAM(s) Oral daily  atorvastatin 40 milliGRAM(s) Oral at bedtime  carvedilol 12.5 milliGRAM(s) Oral every 12 hours  chlorhexidine 2% Cloths 1 Application(s) Topical <User Schedule>  epoetin yolanda-epbx (RETACRIT) Injectable 54205 Unit(s) IV Push <User Schedule>  heparin   Injectable 5000 Unit(s) SubCutaneous every 8 hours  hydrALAZINE 100 milliGRAM(s) Oral two times a day  isosorbide   mononitrate ER Tablet (IMDUR) 60 milliGRAM(s) Oral daily  levETIRAcetam 500 milliGRAM(s) Oral two times a day  melatonin 3 milliGRAM(s) Oral at bedtime PRN  ondansetron Injectable 4 milliGRAM(s) IV Push every 8 hours PRN  pantoprazole    Tablet 40 milliGRAM(s) Oral before breakfast  sacubitril 24 mG/valsartan 26 mG 1 Tablet(s) Oral two times a day  tamsulosin 0.4 milliGRAM(s) Oral at bedtime  torsemide 20 milliGRAM(s) Oral daily      ICU Vital Signs Last 24 Hrs  T(C): 36.7, Max: 36.7 (05-23 @ 08:00)  HR: 65 (60 - 72)  BP: 127/71 (127/62 - 177/72)  BP(mean): 90 (90 - 107)  ABP: --  ABP(mean): --  RR: 18 (14 - 18)  SpO2: 98% (95% - 98%)    Weight in k.5 (25)  Weight in k.9 (25)      I&O's Summary Last 24 Hrs    IN: 240 mL / OUT: 0 mL / NET: 240 mL      Tele: AP 60s, PVCs    Physical Exam:    General: No distress. Comfortable.  Neck: JVP elevated.  Respiratory: Clear to auscultation bilaterally  CV: RRR. Normal S1 and S2. No murmurs, rub, or gallops. Radial pulses normal.  Abdomen: Soft, non-distended, non-tender  Extremities: Warm, no edema  Neurology: Non-focal, alert and oriented times three.   Psych: Affect normal    Labs:    ( 25 @ 11:36 )               9.9    6.72  )--------( 150                  30.6     ( 25 @ 14:20 )  TropHS 113   / CK x     / CKMB x      ( 25 @ 12:05 )  TropHS 117   / CK x     / CKMB x

## 2025-05-24 DIAGNOSIS — Z98.890 OTHER SPECIFIED POSTPROCEDURAL STATES: ICD-10-CM

## 2025-05-24 DIAGNOSIS — R57.8 OTHER SHOCK: ICD-10-CM

## 2025-05-24 LAB
ALBUMIN SERPL ELPH-MCNC: 3.2 G/DL — LOW (ref 3.3–5.2)
ALP SERPL-CCNC: 69 U/L — SIGNIFICANT CHANGE UP (ref 40–120)
ALT FLD-CCNC: 10 U/L — SIGNIFICANT CHANGE UP
ANION GAP SERPL CALC-SCNC: 17 MMOL/L — SIGNIFICANT CHANGE UP (ref 5–17)
AST SERPL-CCNC: 11 U/L — SIGNIFICANT CHANGE UP
BASOPHILS # BLD AUTO: 0.03 K/UL — SIGNIFICANT CHANGE UP (ref 0–0.2)
BASOPHILS NFR BLD AUTO: 0.3 % — SIGNIFICANT CHANGE UP (ref 0–2)
BILIRUB SERPL-MCNC: 1 MG/DL — SIGNIFICANT CHANGE UP (ref 0.4–2)
BLD GP AB SCN SERPL QL: SIGNIFICANT CHANGE UP
BLD GP AB SCN SERPL QL: SIGNIFICANT CHANGE UP
BUN SERPL-MCNC: 53.8 MG/DL — HIGH (ref 8–20)
CALCIUM SERPL-MCNC: 8.8 MG/DL — SIGNIFICANT CHANGE UP (ref 8.4–10.5)
CHLORIDE SERPL-SCNC: 98 MMOL/L — SIGNIFICANT CHANGE UP (ref 96–108)
CO2 SERPL-SCNC: 21 MMOL/L — LOW (ref 22–29)
CREAT SERPL-MCNC: 7.19 MG/DL — HIGH (ref 0.5–1.3)
EGFR: 8 ML/MIN/1.73M2 — LOW
EGFR: 8 ML/MIN/1.73M2 — LOW
EOSINOPHIL # BLD AUTO: 0.38 K/UL — SIGNIFICANT CHANGE UP (ref 0–0.5)
EOSINOPHIL NFR BLD AUTO: 4 % — SIGNIFICANT CHANGE UP (ref 0–6)
GLUCOSE SERPL-MCNC: 93 MG/DL — SIGNIFICANT CHANGE UP (ref 70–99)
HCT VFR BLD CALC: 25.6 % — LOW (ref 39–50)
HCT VFR BLD CALC: 26.9 % — LOW (ref 39–50)
HCT VFR BLD CALC: 27.9 % — LOW (ref 39–50)
HGB BLD-MCNC: 8.6 G/DL — LOW (ref 13–17)
HGB BLD-MCNC: 8.7 G/DL — LOW (ref 13–17)
HGB BLD-MCNC: 9.3 G/DL — LOW (ref 13–17)
IMM GRANULOCYTES # BLD AUTO: 0.03 K/UL — SIGNIFICANT CHANGE UP (ref 0–0.07)
IMM GRANULOCYTES NFR BLD AUTO: 0.3 % — SIGNIFICANT CHANGE UP (ref 0–0.9)
LYMPHOCYTES # BLD AUTO: 0.71 K/UL — LOW (ref 1–3.3)
LYMPHOCYTES NFR BLD AUTO: 7.5 % — LOW (ref 13–44)
MAGNESIUM SERPL-MCNC: 2 MG/DL — SIGNIFICANT CHANGE UP (ref 1.6–2.6)
MCHC RBC-ENTMCNC: 30.6 PG — SIGNIFICANT CHANGE UP (ref 27–34)
MCHC RBC-ENTMCNC: 30.7 PG — SIGNIFICANT CHANGE UP (ref 27–34)
MCHC RBC-ENTMCNC: 31.2 PG — SIGNIFICANT CHANGE UP (ref 27–34)
MCHC RBC-ENTMCNC: 32.3 G/DL — SIGNIFICANT CHANGE UP (ref 32–36)
MCHC RBC-ENTMCNC: 33.3 G/DL — SIGNIFICANT CHANGE UP (ref 32–36)
MCHC RBC-ENTMCNC: 33.6 G/DL — SIGNIFICANT CHANGE UP (ref 32–36)
MCV RBC AUTO: 92.1 FL — SIGNIFICANT CHANGE UP (ref 80–100)
MCV RBC AUTO: 92.8 FL — SIGNIFICANT CHANGE UP (ref 80–100)
MCV RBC AUTO: 94.7 FL — SIGNIFICANT CHANGE UP (ref 80–100)
MONOCYTES # BLD AUTO: 0.54 K/UL — SIGNIFICANT CHANGE UP (ref 0–0.9)
MONOCYTES NFR BLD AUTO: 5.7 % — SIGNIFICANT CHANGE UP (ref 2–14)
NEUTROPHILS # BLD AUTO: 7.76 K/UL — HIGH (ref 1.8–7.4)
NEUTROPHILS NFR BLD AUTO: 82.2 % — HIGH (ref 43–77)
NRBC # BLD AUTO: 0 K/UL — SIGNIFICANT CHANGE UP (ref 0–0)
NRBC # FLD: 0 K/UL — SIGNIFICANT CHANGE UP (ref 0–0)
NRBC BLD AUTO-RTO: 0 /100 WBCS — SIGNIFICANT CHANGE UP (ref 0–0)
PHOSPHATE SERPL-MCNC: 6.3 MG/DL — HIGH (ref 2.4–4.7)
PLATELET # BLD AUTO: 104 K/UL — LOW (ref 150–400)
PLATELET # BLD AUTO: 117 K/UL — LOW (ref 150–400)
PLATELET # BLD AUTO: 96 K/UL — LOW (ref 150–400)
PMV BLD: 10.9 FL — SIGNIFICANT CHANGE UP (ref 7–13)
PMV BLD: 11 FL — SIGNIFICANT CHANGE UP (ref 7–13)
PMV BLD: 11 FL — SIGNIFICANT CHANGE UP (ref 7–13)
POTASSIUM SERPL-MCNC: 4 MMOL/L — SIGNIFICANT CHANGE UP (ref 3.5–5.3)
POTASSIUM SERPL-SCNC: 4 MMOL/L — SIGNIFICANT CHANGE UP (ref 3.5–5.3)
PROT SERPL-MCNC: 5.3 G/DL — LOW (ref 6.6–8.7)
RBC # BLD: 2.76 M/UL — LOW (ref 4.2–5.8)
RBC # BLD: 2.84 M/UL — LOW (ref 4.2–5.8)
RBC # BLD: 3.03 M/UL — LOW (ref 4.2–5.8)
RBC # FLD: 16.9 % — HIGH (ref 10.3–14.5)
RBC # FLD: 17.5 % — HIGH (ref 10.3–14.5)
RBC # FLD: 18 % — HIGH (ref 10.3–14.5)
SODIUM SERPL-SCNC: 136 MMOL/L — SIGNIFICANT CHANGE UP (ref 135–145)
WBC # BLD: 9.17 K/UL — SIGNIFICANT CHANGE UP (ref 3.8–10.5)
WBC # BLD: 9.45 K/UL — SIGNIFICANT CHANGE UP (ref 3.8–10.5)
WBC # BLD: 9.77 K/UL — SIGNIFICANT CHANGE UP (ref 3.8–10.5)
WBC # FLD AUTO: 9.17 K/UL — SIGNIFICANT CHANGE UP (ref 3.8–10.5)
WBC # FLD AUTO: 9.45 K/UL — SIGNIFICANT CHANGE UP (ref 3.8–10.5)
WBC # FLD AUTO: 9.77 K/UL — SIGNIFICANT CHANGE UP (ref 3.8–10.5)

## 2025-05-24 PROCEDURE — 99233 SBSQ HOSP IP/OBS HIGH 50: CPT

## 2025-05-24 PROCEDURE — 99233 SBSQ HOSP IP/OBS HIGH 50: CPT | Mod: GC

## 2025-05-24 PROCEDURE — 99232 SBSQ HOSP IP/OBS MODERATE 35: CPT

## 2025-05-24 RX ORDER — HYDROMORPHONE/SOD CHLOR,ISO/PF 2 MG/10 ML
0.2 SYRINGE (ML) INJECTION ONCE
Refills: 0 | Status: DISCONTINUED | OUTPATIENT
Start: 2025-05-24 | End: 2025-05-24

## 2025-05-24 RX ORDER — ATORVASTATIN CALCIUM 80 MG/1
40 TABLET, FILM COATED ORAL AT BEDTIME
Refills: 0 | Status: DISCONTINUED | OUTPATIENT
Start: 2025-05-24 | End: 2025-06-03

## 2025-05-24 RX ORDER — OXYCODONE HYDROCHLORIDE 30 MG/1
5 TABLET ORAL EVERY 6 HOURS
Refills: 0 | Status: DISCONTINUED | OUTPATIENT
Start: 2025-05-24 | End: 2025-05-27

## 2025-05-24 RX ORDER — ONDANSETRON HCL/PF 4 MG/2 ML
4 VIAL (ML) INJECTION ONCE
Refills: 0 | Status: COMPLETED | OUTPATIENT
Start: 2025-05-24 | End: 2025-05-24

## 2025-05-24 RX ORDER — EPOETIN ALFA 10000 [IU]/ML
10000 SOLUTION INTRAVENOUS; SUBCUTANEOUS ONCE
Refills: 0 | Status: COMPLETED | OUTPATIENT
Start: 2025-05-24 | End: 2025-05-24

## 2025-05-24 RX ORDER — FENTANYL CITRATE-0.9 % NACL/PF 100MCG/2ML
50 SYRINGE (ML) INTRAVENOUS EVERY 4 HOURS
Refills: 0 | Status: DISCONTINUED | OUTPATIENT
Start: 2025-05-24 | End: 2025-05-24

## 2025-05-24 RX ORDER — ACETAMINOPHEN 500 MG/5ML
1000 LIQUID (ML) ORAL ONCE
Refills: 0 | Status: COMPLETED | OUTPATIENT
Start: 2025-05-24 | End: 2025-05-24

## 2025-05-24 RX ORDER — ACETAMINOPHEN 500 MG/5ML
650 LIQUID (ML) ORAL EVERY 6 HOURS
Refills: 0 | Status: DISCONTINUED | OUTPATIENT
Start: 2025-05-24 | End: 2025-06-03

## 2025-05-24 RX ORDER — ALBUMIN (HUMAN) 12.5 G/50ML
100 INJECTION, SOLUTION INTRAVENOUS ONCE
Refills: 0 | Status: COMPLETED | OUTPATIENT
Start: 2025-05-24 | End: 2025-05-24

## 2025-05-24 RX ORDER — ONDANSETRON HCL/PF 4 MG/2 ML
4 VIAL (ML) INJECTION EVERY 8 HOURS
Refills: 0 | Status: DISCONTINUED | OUTPATIENT
Start: 2025-05-24 | End: 2025-06-03

## 2025-05-24 RX ORDER — OXYCODONE HYDROCHLORIDE 30 MG/1
10 TABLET ORAL EVERY 6 HOURS
Refills: 0 | Status: DISCONTINUED | OUTPATIENT
Start: 2025-05-24 | End: 2025-05-27

## 2025-05-24 RX ORDER — BUMETANIDE 1 MG/1
2 TABLET ORAL ONCE
Refills: 0 | Status: COMPLETED | OUTPATIENT
Start: 2025-05-24 | End: 2025-05-24

## 2025-05-24 RX ADMIN — EPOETIN ALFA 10000 UNIT(S): 10000 SOLUTION INTRAVENOUS; SUBCUTANEOUS at 10:42

## 2025-05-24 RX ADMIN — ATORVASTATIN CALCIUM 40 MILLIGRAM(S): 80 TABLET, FILM COATED ORAL at 21:28

## 2025-05-24 RX ADMIN — CARVEDILOL 6.25 MILLIGRAM(S): 3.12 TABLET, FILM COATED ORAL at 21:38

## 2025-05-24 RX ADMIN — Medication 650 MILLIGRAM(S): at 20:00

## 2025-05-24 RX ADMIN — LEVETIRACETAM 500 MILLIGRAM(S): 10 INJECTION, SOLUTION INTRAVENOUS at 21:24

## 2025-05-24 RX ADMIN — Medication 0.2 MILLIGRAM(S): at 09:50

## 2025-05-24 RX ADMIN — Medication 50 MICROGRAM(S): at 01:00

## 2025-05-24 RX ADMIN — OXYCODONE HYDROCHLORIDE 10 MILLIGRAM(S): 30 TABLET ORAL at 21:53

## 2025-05-24 RX ADMIN — OXYCODONE HYDROCHLORIDE 10 MILLIGRAM(S): 30 TABLET ORAL at 22:55

## 2025-05-24 RX ADMIN — Medication 1000 MILLIGRAM(S): at 03:00

## 2025-05-24 RX ADMIN — TAMSULOSIN HYDROCHLORIDE 0.4 MILLIGRAM(S): 0.4 CAPSULE ORAL at 21:24

## 2025-05-24 RX ADMIN — Medication 0.2 MILLIGRAM(S): at 05:33

## 2025-05-24 RX ADMIN — Medication 400 MILLIGRAM(S): at 08:54

## 2025-05-24 RX ADMIN — Medication 0.2 MILLIGRAM(S): at 05:17

## 2025-05-24 RX ADMIN — Medication 400 MILLIGRAM(S): at 02:19

## 2025-05-24 RX ADMIN — OXYCODONE HYDROCHLORIDE 10 MILLIGRAM(S): 30 TABLET ORAL at 14:45

## 2025-05-24 RX ADMIN — Medication 1 APPLICATION(S): at 02:19

## 2025-05-24 RX ADMIN — OXYCODONE HYDROCHLORIDE 10 MILLIGRAM(S): 30 TABLET ORAL at 13:51

## 2025-05-24 RX ADMIN — Medication 1000 MILLIGRAM(S): at 09:30

## 2025-05-24 RX ADMIN — Medication 4 MILLIGRAM(S): at 02:46

## 2025-05-24 RX ADMIN — BUMETANIDE 2 MILLIGRAM(S): 1 TABLET ORAL at 01:26

## 2025-05-24 RX ADMIN — Medication 650 MILLIGRAM(S): at 18:08

## 2025-05-24 RX ADMIN — Medication 0.2 MILLIGRAM(S): at 09:35

## 2025-05-24 RX ADMIN — ALBUMIN (HUMAN) 100 MILLILITER(S): 12.5 INJECTION, SOLUTION INTRAVENOUS at 11:03

## 2025-05-24 RX ADMIN — Medication 50 MICROGRAM(S): at 00:33

## 2025-05-24 NOTE — PROGRESS NOTE ADULT - ASSESSMENT
A: 64M now POD1 from R groin exploration, hematoma evacuation, and primary repair of R CFA s/p access for LHC. Progressing well, extubated and pending downgrade from ICU level of care    Plan:   - Neurovascular checks to continue on floor  - Groin checks for swelling, ecchymosis  - Heparin dvt ppx and ASA 81mg clear to restart from vascular perspective.   - Remainder of care per primary  - Vascular to follow

## 2025-05-24 NOTE — PROGRESS NOTE ADULT - PROBLEM SELECTOR PLAN 5
- S/P hemorrhagic shock from rt groin/thigh large hematoma/post vascular procedure  -Hold GDMT except coreg for now  -HF to see patient on Monday  -GDMT resumption per clinical course

## 2025-05-24 NOTE — PROGRESS NOTE ADULT - SUBJECTIVE AND OBJECTIVE BOX
Chief Complaint:      SUBJECTIVE / OVERNIGHT EVENTS:    Patient denies chest pain, SOB, abd pain, N/V, fever, chills, dysuria or any other complaints. All remainder ROS negative.       I&O's Summary    23 May 2025 07:01  -  24 May 2025 07:00  --------------------------------------------------------  IN: 47.5 mL / OUT: 380 mL / NET: -332.5 mL          PHYSICAL EXAM:  Vital Signs Last 24 Hrs  T(C): 36.8 (24 May 2025 09:15), Max: 36.8 (24 May 2025 01:45)  T(F): 98.3 (24 May 2025 09:15), Max: 98.3 (24 May 2025 09:15)  HR: 74 (24 May 2025 09:15) (60 - 83)  BP: 134/60 (24 May 2025 09:15) (89/49 - 212/81)  BP(mean): 74 (24 May 2025 09:00) (62 - 123)  RR: 14 (24 May 2025 09:15) (12 - 24)  SpO2: 98% (24 May 2025 09:15) (94% - 100%)    Parameters below as of 24 May 2025 09:15  Patient On (Oxygen Delivery Method): room air          CONSTITUTIONAL: pt examined bedside, laying comfortably in bed in NAD  HEENT: NC/AT, moist oral mucosa, clear conjunctiva, sclera nonicteric, EOMI  RESPIRATORY: Normal respiratory effort; CTA b/l, no wheezing, rhonchi, rales  CARDIOVASCULAR: RRR, normal S1 and S2, no murmur/rub/gallop  ABDOMEN: soft, NT/ND, normoactive bowel sounds, no rebound/guarding, no HSM  MUSCLOSKELETAL:  no joint swelling or tenderness to palpation  EXTREMITIES: No cynaosis, no clubbing, no lower extremity edema; Peripheral pulses are 2+ bilaterally  PSYCH: affect appropriate and cooperative  NEUROLOGY: A+O to person, place, and time, no focal neurologic deficits appreciated   SKIN: No rashes or no palpable lesions        LABS:                        8.6    9.17  )-----------( 104      ( 24 May 2025 09:56 )             25.6     05-24    136  |  98  |  53.8[H]  ----------------------------<  93  4.0   |  21.0[L]  |  7.19[H]    Ca    8.8      24 May 2025 02:00  Phos  6.3     05-24  Mg     2.0     05-24    TPro  5.3[L]  /  Alb  3.2[L]  /  TBili  1.0  /  DBili  x   /  AST  11  /  ALT  10  /  AlkPhos  69  05-24    PT/INR - ( 23 May 2025 20:29 )   PT: 15.0 sec;   INR: 1.30 ratio         PTT - ( 23 May 2025 20:29 )  PTT:31.9 sec      Urinalysis Basic - ( 24 May 2025 02:00 )    Color: x / Appearance: x / SG: x / pH: x  Gluc: 93 mg/dL / Ketone: x  / Bili: x / Urobili: x   Blood: x / Protein: x / Nitrite: x   Leuk Esterase: x / RBC: x / WBC x   Sq Epi: x / Non Sq Epi: x / Bacteria: x        CAPILLARY BLOOD GLUCOSE            RADIOLOGY & ADDITIONAL TESTS:    < from: TTE Limited W or WO Ultrasound Enhancing Agent (05.19.25 @ 17:14) >     CONCLUSIONS:      1. Septal motion is abnormal consistent with previous cardiac surgery. Left ventricular systolic function is moderately to severely decreased with an ejection fraction visually estimated at 35 to 40 %.   2. Moderate to severe left ventricular hypertrophy.   3. Normal right ventricular cavity size, with normal wall thickness, and normal right ventricular systolic function.   4. A bioprosthetic valve replacement valve replacement is present in the aortic position The prosthetic valve is well seated with normal function.   5. Bioprosthetic valve present. in the mitral position. Well seated prosthetic mitral valve with normal function. There is trace intravalvular mitral regurgitation.   6. Estimated pulmonary artery systolic pressure is 83 mmHg, consistent with severe pulmonary hypertension.   7. The inferior vena cava is dilated measuring 2.53 cm in diameter, (dilated >2.1cm) with abnormal inspiratory collapse (abnormal <50%) consistent with elevated right atrial pressure (~15, range 10-20mmHg).   8. No pericardial effusion seen.    < end of copied text >      < from: CT Angio Abd Aorta w/run-off w/ IV Cont (05.23.25 @ 15:24) >  IMPRESSION:    Massive right groin and proximal thigh hematoma without active bleeding   presumably from common femoral artery.    Bilateral three-vessel runoff.    Enhancing right renal lesion consistent with neoplasm. Recommend   follow-up CT with renal protocol.    The patient had been transported to the OR prior to submission of the CT   report.    < end of copied text >        MEDICATIONS  (STANDING):  carvedilol 6.25 milliGRAM(s) Oral every 12 hours  chlorhexidine 2% Cloths 1 Application(s) Topical <User Schedule>  levETIRAcetam 500 milliGRAM(s) Oral two times a day  tamsulosin 0.4 milliGRAM(s) Oral at bedtime    MEDICATIONS  (PRN):  hydrALAZINE Injectable 10 milliGRAM(s) IV Push every 3 hours PRN sbp>160  sodium chloride 0.9% lock flush 10 milliLiter(s) IV Push every 1 hour PRN Pre/post blood products, medications, blood draw, and to maintain line patency                                   MICU Transfer/ Hospital course:   63 y/o M w/ PMH of ESRD s/p renal transplant on HD Tu/Th/Sat, type A aortic dissection s/p repair (2010), ischemic bowel resection, CAD s/p CABG x 2 (SVG-OM1and SVG-RPDA), bioprosthetic AVR/MVR (2020 w/ Dr. Butler), AF s/p ablation (2021) and LAAO device (8/2024), MV endocarditis (2023), seizure disorder, BPH, remote hx of substance abuse and former smoker initially presented to [facility] c/o SOB and was admitted 5/19/25 to medicine for new acute CHF exacerbation.  TTE on 5/19/25 was significant for newly reduced LV EF 35-40%.  Pt underwent RHC/LHC on 5/22/25 via RFA.  RHC showed mildly elevated filling pressures and increased CO.  During LHC was unable to pass through aortic root due to prior type A dissection repair.  Hospital course complicated by pt developing Rt groin hematoma on 05/23 noted in AM.  Cardio/cath lab called.  Initially manual pressure applied and fem stop placed but hematoma reoccurred/expanded refractory to fem stop.  Vascular was consulted and stat CTA RLE was ordered which showed a massive hematoma and pt was urgently taken to OR for exploration/ evacuation of  RLE hematoma,  Pt received 1 unit PRBC pre OR but during OR pt went into hemorrhagic shock and MTP was initiated and pt was subsequently given another 4u pRBC, 2u FFP and 1u plt in OR.  Pt s/p open cut-down and evacuation of 1L of fresh blood, femoral artery behind profunda vein was ligated and transected, 3mm arteriotomy w/ active bleeding at common femoral artery bifurcation repaired with suture, bleeding stopped and Rt groin DEDRICK drain placed.  Given pt received significant amount of blood products pre/intra-op decision was made to leave pt intubated for resp failure 2/2 vol overload from CHF but did not require pressor support.  Pt was transferred to MICU.  Pt was successfully extubated early morning of 5/24 and is currently on room air.  H/H w/ slight down trend this morning but otherwise is hemodynamically and cleared for down grade to medicine for continued monitoring.            I&O's Summary    23 May 2025 07:01  -  24 May 2025 07:00  --------------------------------------------------------  IN: 47.5 mL / OUT: 380 mL / NET: -332.5 mL          PHYSICAL EXAM:  Vital Signs Last 24 Hrs  T(C): 36.8 (24 May 2025 09:15), Max: 36.8 (24 May 2025 01:45)  T(F): 98.3 (24 May 2025 09:15), Max: 98.3 (24 May 2025 09:15)  HR: 74 (24 May 2025 09:15) (60 - 83)  BP: 134/60 (24 May 2025 09:15) (89/49 - 212/81)  BP(mean): 74 (24 May 2025 09:00) (62 - 123)  RR: 14 (24 May 2025 09:15) (12 - 24)  SpO2: 98% (24 May 2025 09:15) (94% - 100%)    Parameters below as of 24 May 2025 09:15  Patient On (Oxygen Delivery Method): room air        GENERAL: pt examined bedside, laying comfortably in bed in NAD  HEENT: NC/AT, moist oral mucosa, clear conjunctiva, sclera nonicteric, EOMI  RESPIRATORY: Normal respiratory effort; CTA b/l, no wheezing, rhonchi, rales  CARDIOVASCULAR: RRR, normal S1 and S2, no murmur/rub/gallop  ABDOMEN: soft, NT/ND, normoactive bowel sounds, no rebound/guarding, no HSM  MUSCLOSKELETAL:  no joint swelling or tenderness to palpation  EXTREMITIES: No cynaosis, no clubbing, no lower extremity edema; Peripheral pulses are 2+ bilaterally  PSYCH: affect appropriate and cooperative  NEUROLOGY: A+O to person, place, and time, no focal neurologic deficits appreciated   SKIN: No rashes or no palpable lesions        LABS:                        8.6    9.17  )-----------( 104      ( 24 May 2025 09:56 )             25.6     05-24    136  |  98  |  53.8[H]  ----------------------------<  93  4.0   |  21.0[L]  |  7.19[H]    Ca    8.8      24 May 2025 02:00  Phos  6.3     05-24  Mg     2.0     05-24    TPro  5.3[L]  /  Alb  3.2[L]  /  TBili  1.0  /  DBili  x   /  AST  11  /  ALT  10  /  AlkPhos  69  05-24    PT/INR - ( 23 May 2025 20:29 )   PT: 15.0 sec;   INR: 1.30 ratio         PTT - ( 23 May 2025 20:29 )  PTT:31.9 sec      Urinalysis Basic - ( 24 May 2025 02:00 )    Color: x / Appearance: x / SG: x / pH: x  Gluc: 93 mg/dL / Ketone: x  / Bili: x / Urobili: x   Blood: x / Protein: x / Nitrite: x   Leuk Esterase: x / RBC: x / WBC x   Sq Epi: x / Non Sq Epi: x / Bacteria: x        CAPILLARY BLOOD GLUCOSE            RADIOLOGY & ADDITIONAL TESTS:    < from: TTE Limited W or WO Ultrasound Enhancing Agent (05.19.25 @ 17:14) >     CONCLUSIONS:      1. Septal motion is abnormal consistent with previous cardiac surgery. Left ventricular systolic function is moderately to severely decreased with an ejection fraction visually estimated at 35 to 40 %.   2. Moderate to severe left ventricular hypertrophy.   3. Normal right ventricular cavity size, with normal wall thickness, and normal right ventricular systolic function.   4. A bioprosthetic valve replacement valve replacement is present in the aortic position The prosthetic valve is well seated with normal function.   5. Bioprosthetic valve present. in the mitral position. Well seated prosthetic mitral valve with normal function. There is trace intravalvular mitral regurgitation.   6. Estimated pulmonary artery systolic pressure is 83 mmHg, consistent with severe pulmonary hypertension.   7. The inferior vena cava is dilated measuring 2.53 cm in diameter, (dilated >2.1cm) with abnormal inspiratory collapse (abnormal <50%) consistent with elevated right atrial pressure (~15, range 10-20mmHg).   8. No pericardial effusion seen.    < end of copied text >      < from: CT Angio Abd Aorta w/run-off w/ IV Cont (05.23.25 @ 15:24) >  IMPRESSION:    Massive right groin and proximal thigh hematoma without active bleeding   presumably from common femoral artery.    Bilateral three-vessel runoff.    Enhancing right renal lesion consistent with neoplasm. Recommend   follow-up CT with renal protocol.    The patient had been transported to the OR prior to submission of the CT   report.    < end of copied text >        MEDICATIONS  (STANDING):  carvedilol 6.25 milliGRAM(s) Oral every 12 hours  chlorhexidine 2% Cloths 1 Application(s) Topical <User Schedule>  levETIRAcetam 500 milliGRAM(s) Oral two times a day  tamsulosin 0.4 milliGRAM(s) Oral at bedtime    MEDICATIONS  (PRN):  hydrALAZINE Injectable 10 milliGRAM(s) IV Push every 3 hours PRN sbp>160  sodium chloride 0.9% lock flush 10 milliLiter(s) IV Push every 1 hour PRN Pre/post blood products, medications, blood draw, and to maintain line patency                                   MICU Transfer/ Hospital course:   65 y/o M w/ PMH of ESRD s/p renal transplant on HD Tu/Th/Sat, type A aortic dissection s/p repair (2010), ischemic bowel resection, CAD s/p CABG x 2 (SVG-OM1and SVG-RPDA), bioprosthetic AVR/MVR (2020 w/ Dr. Butler), AF s/p ablation (2021) and LAAO device (8/2024), MV endocarditis (2023), seizure disorder, BPH, remote hx of substance abuse and former smoker initially presented to [facility] c/o SOB and was admitted 5/19/25 to medicine for new acute CHF exacerbation.  TTE on 5/19/25 was significant for newly reduced LV EF 35-40%.  Pt underwent RHC/LHC on 5/22/25 via RFA.  RHC showed mildly elevated filling pressures and increased CO.  During LHC was unable to pass through aortic root due to prior type A dissection repair.  Hospital course complicated by pt developing Rt groin hematoma on 05/23 noted in AM.  Cardio/cath lab called.  Initially manual pressure applied and fem stop placed but hematoma reoccurred/expanded refractory to fem stop.  Vascular was consulted and stat CTA RLE was ordered which showed a massive hematoma and pt was urgently taken to OR for exploration/ evacuation of  RLE hematoma,  Pt received 1 unit PRBC pre OR but during OR pt went into hemorrhagic shock and MTP was initiated and pt was subsequently given another 4u pRBC, 2u FFP and 1u plt in OR.  Pt s/p open cut-down and evacuation of 1L of fresh blood, femoral artery behind profunda vein was ligated and transected, 3mm arteriotomy w/ active bleeding at common femoral artery bifurcation repaired with suture, bleeding stopped and Rt groin DEDRICK drain placed.  Given pt received significant amount of blood products pre/intra-op decision was made to leave pt intubated for resp failure 2/2 vol overload from CHF but did not require pressor support.  Pt was transferred to MICU.  Pt was successfully extubated early morning of 5/24 and is currently on room air.  H/H w/ slight down trend this morning but otherwise is hemodynamically and cleared for down grade to medicine for continued monitoring.            I&O's Summary    23 May 2025 07:01  -  24 May 2025 07:00  --------------------------------------------------------  IN: 47.5 mL / OUT: 380 mL / NET: -332.5 mL          PHYSICAL EXAM:  Vital Signs Last 24 Hrs  T(C): 36.8 (24 May 2025 09:15), Max: 36.8 (24 May 2025 01:45)  T(F): 98.3 (24 May 2025 09:15), Max: 98.3 (24 May 2025 09:15)  HR: 74 (24 May 2025 09:15) (60 - 83)  BP: 134/60 (24 May 2025 09:15) (89/49 - 212/81)  BP(mean): 74 (24 May 2025 09:00) (62 - 123)  RR: 14 (24 May 2025 09:15) (12 - 24)  SpO2: 98% (24 May 2025 09:15) (94% - 100%)    Parameters below as of 24 May 2025 09:15  Patient On (Oxygen Delivery Method): room air        GENERAL: pt examined bedside, laying comfortably in bed in NAD  HEENT: NC/AT, moist oral mucosa, pale conjunctiva, sclera nonicteric  RESPIRATORY: Normal respiratory effort; CTA b/l, no wheezing, rhonchi, rales  CARDIOVASCULAR: RRR, normal S1 and S2, no murmur/rub/gallop  ABDOMEN: soft, NT/ND, normoactive bowel sounds, no rebound/guarding  EXTREMITIES: No cynaosis, no clubbing, no lower extremity edema, extremities warm w/ DP/PT palpable  NEUROLOGY: A+O to person, place, and time, no focal neurologic deficits appreciated   SKIN: pallor, Rt groin hematoma w/ dressing in place that has minimal amount of dry blood appreciated, DEDRICK drain in place at rt groin       LABS:                        8.6    9.17  )-----------( 104      ( 24 May 2025 09:56 )             25.6     05-24    136  |  98  |  53.8[H]  ----------------------------<  93  4.0   |  21.0[L]  |  7.19[H]    Ca    8.8      24 May 2025 02:00  Phos  6.3     05-24  Mg     2.0     05-24    TPro  5.3[L]  /  Alb  3.2[L]  /  TBili  1.0  /  DBili  x   /  AST  11  /  ALT  10  /  AlkPhos  69  05-24    PT/INR - ( 23 May 2025 20:29 )   PT: 15.0 sec;   INR: 1.30 ratio         PTT - ( 23 May 2025 20:29 )  PTT:31.9 sec      Urinalysis Basic - ( 24 May 2025 02:00 )    Color: x / Appearance: x / SG: x / pH: x  Gluc: 93 mg/dL / Ketone: x  / Bili: x / Urobili: x   Blood: x / Protein: x / Nitrite: x   Leuk Esterase: x / RBC: x / WBC x   Sq Epi: x / Non Sq Epi: x / Bacteria: x        CAPILLARY BLOOD GLUCOSE            RADIOLOGY & ADDITIONAL TESTS:    < from: TTE Limited W or WO Ultrasound Enhancing Agent (05.19.25 @ 17:14) >     CONCLUSIONS:      1. Septal motion is abnormal consistent with previous cardiac surgery. Left ventricular systolic function is moderately to severely decreased with an ejection fraction visually estimated at 35 to 40 %.   2. Moderate to severe left ventricular hypertrophy.   3. Normal right ventricular cavity size, with normal wall thickness, and normal right ventricular systolic function.   4. A bioprosthetic valve replacement valve replacement is present in the aortic position The prosthetic valve is well seated with normal function.   5. Bioprosthetic valve present. in the mitral position. Well seated prosthetic mitral valve with normal function. There is trace intravalvular mitral regurgitation.   6. Estimated pulmonary artery systolic pressure is 83 mmHg, consistent with severe pulmonary hypertension.   7. The inferior vena cava is dilated measuring 2.53 cm in diameter, (dilated >2.1cm) with abnormal inspiratory collapse (abnormal <50%) consistent with elevated right atrial pressure (~15, range 10-20mmHg).   8. No pericardial effusion seen.    < end of copied text >      < from: CT Angio Abd Aorta w/run-off w/ IV Cont (05.23.25 @ 15:24) >  IMPRESSION:    Massive right groin and proximal thigh hematoma without active bleeding   presumably from common femoral artery.    Bilateral three-vessel runoff.    Enhancing right renal lesion consistent with neoplasm. Recommend   follow-up CT with renal protocol.    The patient had been transported to the OR prior to submission of the CT   report.    < end of copied text >        MEDICATIONS  (STANDING):  carvedilol 6.25 milliGRAM(s) Oral every 12 hours  chlorhexidine 2% Cloths 1 Application(s) Topical <User Schedule>  levETIRAcetam 500 milliGRAM(s) Oral two times a day  tamsulosin 0.4 milliGRAM(s) Oral at bedtime    MEDICATIONS  (PRN):  hydrALAZINE Injectable 10 milliGRAM(s) IV Push every 3 hours PRN sbp>160  sodium chloride 0.9% lock flush 10 milliLiter(s) IV Push every 1 hour PRN Pre/post blood products, medications, blood draw, and to maintain line patency                                   MICU Transfer/ Hospital course:   63 y/o M w/ PMH of ESRD s/p renal transplant on HD Tu/Th/Sat, type A aortic dissection s/p repair (2010), ischemic bowel resection, CAD s/p CABG x 2 (SVG-OM1and SVG-RPDA), bioprosthetic AVR/MVR (2020 w/ Dr. Butler), AF s/p ablation (2021) and LAAO device (8/2024), MV endocarditis (2023), seizure disorder, BPH, remote hx of substance abuse and former smoker initially presented to [facility] c/o SOB and was admitted 5/19/25 to medicine for new acute CHF exacerbation.  TTE on 5/19/25 was significant for newly reduced LV EF 35-40%.  Pt underwent RHC/LHC on 5/22/25 via RFA.  RHC showed mildly elevated filling pressures and increased CO.  During LHC was unable to pass through aortic root due to prior type A dissection repair.  Hospital course complicated by pt developing Rt groin hematoma on 05/23 noted in AM.  Cardio/cath lab called.  Initially manual pressure applied and fem stop placed but hematoma reoccurred/expanded refractory to fem stop.  Vascular was consulted and stat CTA RLE was ordered which showed a massive hematoma and pt was urgently taken to OR for exploration/ evacuation of  RLE hematoma,  Pt received 1 unit PRBC pre OR but during OR pt went into hemorrhagic shock and MTP was initiated and pt was subsequently given another 4u pRBC, 2u FFP and 1u plt in OR.  Pt s/p open cut-down and evacuation of 1L of fresh blood, femoral artery behind profunda vein was ligated and transected, 3mm arteriotomy w/ active bleeding at common femoral artery bifurcation repaired with suture, bleeding stopped and Rt groin DEDRICK drain placed.  Given pt received significant amount of blood products pre/intra-op decision was made to leave pt intubated for resp failure 2/2 vol overload from CHF but did not require pressor support.  Pt was transferred to MICU.  Pt was successfully extubated early morning of 5/24 and is currently on room air.  H/H w/ slight down trend this morning but otherwise is hemodynamically and cleared for down grade to medicine for continued monitoring.            I&O's Summary    23 May 2025 07:01  -  24 May 2025 07:00  --------------------------------------------------------  IN: 47.5 mL / OUT: 380 mL / NET: -332.5 mL          PHYSICAL EXAM:  Vital Signs Last 24 Hrs  T(C): 36.8 (24 May 2025 09:15), Max: 36.8 (24 May 2025 01:45)  T(F): 98.3 (24 May 2025 09:15), Max: 98.3 (24 May 2025 09:15)  HR: 74 (24 May 2025 09:15) (60 - 83)  BP: 134/60 (24 May 2025 09:15) (89/49 - 212/81)  BP(mean): 74 (24 May 2025 09:00) (62 - 123)  RR: 14 (24 May 2025 09:15) (12 - 24)  SpO2: 98% (24 May 2025 09:15) (94% - 100%)    Parameters below as of 24 May 2025 09:15  Patient On (Oxygen Delivery Method): room air        GENERAL: pt examined bedside, laying comfortably in bed in NAD  HEENT: NC/AT, moist oral mucosa, pale conjunctiva, sclera nonicteric  RESPIRATORY: Normal respiratory effort; CTA b/l, no wheezing, rhonchi, rales  CARDIOVASCULAR: irregularly irregular, normal S1 and S2, +murmurs  ABDOMEN: soft, NT/ND, normoactive bowel sounds, no rebound/guarding  EXTREMITIES: No cynaosis, no clubbing, no lower extremity edema, extremities warm w/ DP/PT palpable  NEUROLOGY: A+O to person, place, and time, no focal neurologic deficits appreciated   SKIN: pallor, Rt groin hematoma w/ dressing in place that has minimal amount of dry blood appreciated, DEDRICK drain in place at rt groin       LABS:                        8.6    9.17  )-----------( 104      ( 24 May 2025 09:56 )             25.6     05-24    136  |  98  |  53.8[H]  ----------------------------<  93  4.0   |  21.0[L]  |  7.19[H]    Ca    8.8      24 May 2025 02:00  Phos  6.3     05-24  Mg     2.0     05-24    TPro  5.3[L]  /  Alb  3.2[L]  /  TBili  1.0  /  DBili  x   /  AST  11  /  ALT  10  /  AlkPhos  69  05-24    PT/INR - ( 23 May 2025 20:29 )   PT: 15.0 sec;   INR: 1.30 ratio         PTT - ( 23 May 2025 20:29 )  PTT:31.9 sec      Urinalysis Basic - ( 24 May 2025 02:00 )    Color: x / Appearance: x / SG: x / pH: x  Gluc: 93 mg/dL / Ketone: x  / Bili: x / Urobili: x   Blood: x / Protein: x / Nitrite: x   Leuk Esterase: x / RBC: x / WBC x   Sq Epi: x / Non Sq Epi: x / Bacteria: x        CAPILLARY BLOOD GLUCOSE            RADIOLOGY & ADDITIONAL TESTS:    < from: TTE Limited W or WO Ultrasound Enhancing Agent (05.19.25 @ 17:14) >     CONCLUSIONS:      1. Septal motion is abnormal consistent with previous cardiac surgery. Left ventricular systolic function is moderately to severely decreased with an ejection fraction visually estimated at 35 to 40 %.   2. Moderate to severe left ventricular hypertrophy.   3. Normal right ventricular cavity size, with normal wall thickness, and normal right ventricular systolic function.   4. A bioprosthetic valve replacement valve replacement is present in the aortic position The prosthetic valve is well seated with normal function.   5. Bioprosthetic valve present. in the mitral position. Well seated prosthetic mitral valve with normal function. There is trace intravalvular mitral regurgitation.   6. Estimated pulmonary artery systolic pressure is 83 mmHg, consistent with severe pulmonary hypertension.   7. The inferior vena cava is dilated measuring 2.53 cm in diameter, (dilated >2.1cm) with abnormal inspiratory collapse (abnormal <50%) consistent with elevated right atrial pressure (~15, range 10-20mmHg).   8. No pericardial effusion seen.    < end of copied text >      < from: CT Angio Abd Aorta w/run-off w/ IV Cont (05.23.25 @ 15:24) >  IMPRESSION:    Massive right groin and proximal thigh hematoma without active bleeding   presumably from common femoral artery.    Bilateral three-vessel runoff.    Enhancing right renal lesion consistent with neoplasm. Recommend   follow-up CT with renal protocol.    The patient had been transported to the OR prior to submission of the CT   report.    < end of copied text >        MEDICATIONS  (STANDING):  carvedilol 6.25 milliGRAM(s) Oral every 12 hours  chlorhexidine 2% Cloths 1 Application(s) Topical <User Schedule>  levETIRAcetam 500 milliGRAM(s) Oral two times a day  tamsulosin 0.4 milliGRAM(s) Oral at bedtime    MEDICATIONS  (PRN):  hydrALAZINE Injectable 10 milliGRAM(s) IV Push every 3 hours PRN sbp>160  sodium chloride 0.9% lock flush 10 milliLiter(s) IV Push every 1 hour PRN Pre/post blood products, medications, blood draw, and to maintain line patency

## 2025-05-24 NOTE — PROGRESS NOTE ADULT - ASSESSMENT
Assessment     65 y/o M with significant PMH ESRD s/p renal transplant on HD Tu/Th/Sat, type A aortic dissection repair (2010), ischemic bowel resection, CAD s/p CABG x 2 (SVG-OM1  and SVG-RPDA), bioprosthetic AVR/MVR (2020 Dr. Butler), Afib s/p ablation (2021), LAAO device (8/2024), MV endocarditis (2023), remote history of substance abuse and former smoker who presented with c/o shortness of breath.  TTE this admission revealed newly reduced LV systolic function 35-40%. LHC was attempted on 05/22/25 vi RFA access, access sealed with angio seal,  by Mohinder Colon,  but unable to pass through aortic root in view of prior type A dissection repair. RHC showed mildly elevated filling pressures and increased CO   TTE 5/19/25: LVEF 35-40%, LVIDD 4.6cm, LVOT VTI 15.9cm, moderate to severe LVH, normal RV size/function, well seated normal functioning bioprosthetic aortic valve, well seated normal functioning bioprosthetic mitral valve with trace intravalvular regurgitation, PASP 83 mmHg (RAP 15 mmHg).   Post R/LHC (05/22) patient's rt groin was benign  for 12 hours, Rt groin hematoma developed on 05/23, morning, initially was able to compress the hematoma using manual pressure and fem stop, but hematoma reoccured and started  expanding, refractory to fem stop, required urgent  vascular consult   subsequently pt underwent CTA RLE with massive hematoma, urgently taken to OR for exploration/ evacuation of  tt groin/thigh hematoma, Received 1 unit PRBC pre OR,   Hb dropped to 7.5, massive transfusion protocol activated in oR, received 4 units pRBC/2 units FFP/ 1 unit PLT in OR  , s/p open cut-down and evacuation of 1L of fresh blood, femoral artery behind profunda vein was ligated and transected, 3 mm arteriotomy w/ active bleeding at common femoral artery bifurcation, repaired with suture and cessation of bleeding.   Palpation DP/PT on RLE. Left 15 Zac Zhao drain in groin.   Given 5/2/1 prbc/ffp/plt in OR and was left intubated due to concern for respiratory failure and volume overload in setting of blood products resuscitation   patient got extubated today at 01: 35 am, on RA now, he is currently hemodynamically stable, appears very fatigued,  rt groin with no bleeding or hematoma dressing intact, Rt groin drain in place with minimal amount of serous drainage.  Groin tender to touch, Femoral popliteal, DP/PT pulses palpable, RLE warm and perfusing  Patient scheduled for HD today       # 1 PROBLEM   -Right groin large hematoma post LHC VIA RFA/ Angioseal Hemorraghic Shock    PLAN    - Patient s/p open cut-down and evacuation of 1L of fresh blood, femoral artery behind profunda vein was ligated and transected, 3 mm arteriotomy w/ active bleeding at common femoral artery bifurcation, repaired with suture and cessation of bleeding.   Palpation DP/PT on RLE. Left 15 Zac Zhao drain in groin.   -RLE warm, perfusing,  Groin tender to touch, Femoral popliteal, DP/PT pulses palpable, RLE warm and perfusing  - Patient required transfusion of multiple blood products in OR due to hemorraghic shock  - Currently hemodynamically stable overnight, s/p extubation overnight  - Monitor vitals per ICU protocol  - Post op management of rt groin/thigh per vascular, appreciate vascular input and interventions  - Monitor drain output per icu protocol  -NV check of rtLE per ICU protocol       Problem/Plan - 2  ·  Problem: CAD   ·  Plan: LVEF 35-40%, LVIDd 4.6cm, normal RV size/fx  LHC was attempted on 05/22/25 vi RFA access, access sealed with angio seal,  by Mohinder Colon,  but unable to pass through aortic root in view of prior type A dissection repair. RHC showed mildly elevated filling pressures and increased CO    S/p CTA on 05/23 revealed LM: Diffuse significant calcified plaque noted with multiple areas of indeterminate stenoses. LAD: Diffuse significant calcified plaque noted with multiple areas of indeterminate stenoses.  DIAGONAL BRANCHES: Not well visualized.RAMUS INTERMEDIUS: Not visualized.CIRCUMFLEX ARTERY: Diffuse significant calcified plaque noted with multiple areas of indeterminate stenoses.  Obtuse marginal (OM): Not well visualized.RIGHT CORONARY ARTERY: Diffuse significant calcified plaque noted with multiple areas of indeterminate stenoses.Posterior descending artery (PDA):Not well visualized.Posterolateral branch (PL):  Not visualized.  -Medical management now in setting of s/p rt groin /thigh exploration/ hematoma evacuation and repair of CFA bleeding  -Will hold aspirin for now in setting of  rt groin /thighvascular procedure yesterday  -Restart aspirin 81mg po daily when vascular clear the patient  - Patient was on  atorvastatin 40mg po daily HS, will restart statin     # PROBLEM/PLAN    # Acute systolic heart failure   TTE this admission revealed newly reduced LV systolic function 35-40%. LHC was attempted on 05/22/25 vi RFA access, access sealed with angio seal,  by Mohinder Colon,  but unable to pass through aortic root in view of prior type A dissection repair. RHC showed mildly elevated filling pressures and increased CO   TTE 5/19/25: LVEF 35-40%, LVIDD 4.6cm, LVOT VTI 15.9cm, moderate to severe LVH, normal RV size/function, well seated normal functioning bioprosthetic aortic valve, well seated normal functioning bioprosthetic mitral valve with trace intravalvular regurgitation, PASP 83 mmHg (RAP 15 mmHg).   Admission proBNP >50K  GDMT as below  -C/W Coreg 6.25 mg po bid with parameters of holding for SBP < 110, HR< 60 BPM  -Continue to hold Entresto Hydralazine PO, torsemide, Imdur in setting of s/p hemorraghic shock  -HF to follow pt on Monday and resumption of GDMT per HF recommendations  Fluid removal with TODAY and follow nephrology recs. for further HD sessions    Above discussed with Attending        Assessment     63 y/o M with significant PMH ESRD s/p renal transplant on HD Tu/Th/Sat, type A aortic dissection repair (2010), ischemic bowel resection, CAD s/p CABG x 2 (SVG-OM1  and SVG-RPDA), bioprosthetic AVR/MVR (2020 Dr. Butler), Afib s/p ablation (2021), LAAO device (8/2024), MV endocarditis (2023), remote history of substance abuse and former smoker who presented with c/o shortness of breath.  TTE this admission revealed newly reduced LV systolic function 35-40%. LHC was attempted on 05/22/25 vi RFA access, access sealed with angio seal,  by Mohinder Colon,  but unable to pass through aortic root in view of prior type A dissection repair. RHC showed mildly elevated filling pressures and increased CO   TTE 5/19/25: LVEF 35-40%, LVIDD 4.6cm, LVOT VTI 15.9cm, moderate to severe LVH, normal RV size/function, well seated normal functioning bioprosthetic aortic valve, well seated normal functioning bioprosthetic mitral valve with trace intravalvular regurgitation, PASP 83 mmHg (RAP 15 mmHg).   Post R/LHC (05/22) patient's rt groin was benign  for 12 hours, Rt groin hematoma developed on 05/23, morning, initially was able to compress the hematoma using manual pressure and fem stop, but hematoma reoccured and started  expanding, refractory to fem stop, required urgent  vascular consult   subsequently pt underwent CTA RLE with massive hematoma, urgently taken to OR for exploration/ evacuation of  tt groin/thigh hematoma, Received 1 unit PRBC pre OR,   Hb dropped to 7.5, massive transfusion protocol activated in oR, received 4 units pRBC/2 units FFP/ 1 unit PLT in OR  , s/p open cut-down and evacuation of 1L of fresh blood, femoral artery behind profunda vein was ligated and transected, 3 mm arteriotomy w/ active bleeding at common femoral artery bifurcation, repaired with suture and cessation of bleeding.   Palpation DP/PT on RLE. Left 15 Zac Zhao drain in groin.   Given 5/2/1 prbc/ffp/plt in OR and was left intubated due to concern for respiratory failure and volume overload in setting of blood products resuscitation   patient got extubated today at 01: 35 am, on RA now, he is currently hemodynamically stable, appears very fatigued,  rt groin with no bleeding or hematoma dressing intact, Rt groin drain in place with minimal amount of serous drainage.  Groin tender to touch, Femoral popliteal, DP/PT pulses palpable, RLE warm and perfusing  Patient scheduled for HD today     Plan as below, discussed with Attedkim

## 2025-05-24 NOTE — PROGRESS NOTE ADULT - SUBJECTIVE AND OBJECTIVE BOX
INTERVAL HPI/OVERNIGHT EVENTS:    Patient evaluated at bedside. No acute distress. No acute events overnight. Pending downgrade from ICU to floor, extubated overnight.     MEDICATIONS  (STANDING):  carvedilol 6.25 milliGRAM(s) Oral every 12 hours  chlorhexidine 2% Cloths 1 Application(s) Topical <User Schedule>  levETIRAcetam 500 milliGRAM(s) Oral two times a day  tamsulosin 0.4 milliGRAM(s) Oral at bedtime    MEDICATIONS  (PRN):  acetaminophen     Tablet .. 650 milliGRAM(s) Oral every 6 hours PRN Mild Pain (1 - 3)  hydrALAZINE Injectable 10 milliGRAM(s) IV Push every 3 hours PRN sbp>160  ondansetron Injectable 4 milliGRAM(s) IV Push every 8 hours PRN Nausea and/or Vomiting  oxyCODONE    IR 5 milliGRAM(s) Oral every 6 hours PRN Moderate Pain (4 - 6)  oxyCODONE    IR 10 milliGRAM(s) Oral every 6 hours PRN Severe Pain (7 - 10)  sodium chloride 0.9% lock flush 10 milliLiter(s) IV Push every 1 hour PRN Pre/post blood products, medications, blood draw, and to maintain line patency      Vital Signs Last 24 Hrs  T(C): 36.8 (24 May 2025 16:56), Max: 36.9 (24 May 2025 10:00)  T(F): 98.2 (24 May 2025 16:56), Max: 98.4 (24 May 2025 10:00)  HR: 74 (24 May 2025 16:56) (62 - 83)  BP: 122/60 (24 May 2025 16:56) (89/49 - 212/81)  BP(mean): 78 (24 May 2025 14:00) (62 - 123)  RR: 17 (24 May 2025 16:56) (12 - 24)  SpO2: 98% (24 May 2025 16:56) (94% - 100%)    Parameters below as of 24 May 2025 16:56  Patient On (Oxygen Delivery Method): room air        PHYSICAL EXAM:  GENERAL: NAD, well-groomed, well-developed  HEAD:  Atraumatic, Normocephalic  EYES: EOMI, PERRLA, conjunctiva and sclera clear  NECK: Supple, No JVD  CHEST/LUNG: non-labored breathing on room air.   HEART: Regular rate and rhythm  ABDOMEN: Soft, Nontender, Nondistended  VASCULAR: Normal pulses, Normal capillary refill  EXTREMITIES:   - No cyanosis, no edema b/l  - B/l palp DP and PT  - R groin soft, no palpable hematoma or visible ecchymosis  SKIN: Warm, Intact      I&O's Detail    23 May 2025 07:01  -  24 May 2025 07:00  --------------------------------------------------------  IN:    Propofol: 47.5 mL  Total IN: 47.5 mL    OUT:    Bulb (mL): 30 mL    Indwelling Catheter - Urethral (mL): 350 mL    Voided (mL): 0 mL  Total OUT: 380 mL    Total NET: -332.5 mL      24 May 2025 07:01  -  24 May 2025 18:57  --------------------------------------------------------  IN:    Oral Fluid: 360 mL  Total IN: 360 mL    OUT:    Bulb (mL): 20 mL    Indwelling Catheter - Urethral (mL): 145 mL    Other (mL): 1000 mL  Total OUT: 1165 mL    Total NET: -805 mL          LABS:                        8.6    9.17  )-----------( 104      ( 24 May 2025 09:56 )             25.6     05-24    136  |  98  |  53.8[H]  ----------------------------<  93  4.0   |  21.0[L]  |  7.19[H]    Ca    8.8      24 May 2025 02:00  Phos  6.3     05-24  Mg     2.0     05-24    TPro  5.3[L]  /  Alb  3.2[L]  /  TBili  1.0  /  DBili  x   /  AST  11  /  ALT  10  /  AlkPhos  69  05-24    PT/INR - ( 23 May 2025 20:29 )   PT: 15.0 sec;   INR: 1.30 ratio         PTT - ( 23 May 2025 20:29 )  PTT:31.9 sec  Urinalysis Basic - ( 24 May 2025 02:00 )    Color: x / Appearance: x / SG: x / pH: x  Gluc: 93 mg/dL / Ketone: x  / Bili: x / Urobili: x   Blood: x / Protein: x / Nitrite: x   Leuk Esterase: x / RBC: x / WBC x   Sq Epi: x / Non Sq Epi: x / Bacteria: x        RADIOLOGY & ADDITIONAL STUDIES:

## 2025-05-24 NOTE — PROGRESS NOTE ADULT - PROBLEM SELECTOR PLAN 4
- s/p watchman procedure  - not on AC  - cont asa  - tele monitoring    Discussed with Dr. Carvalho. Assessment and recommendations are final when note is signed by the attending.
.  - s/p watchman procedure  - not on AC  - cont asa  - tele monitoring
.  - s/p watchman procedure  - not on AC  - A-paced on telemetry  - cont asa  - tele monitoring
Chronic atrial fibrillation.   - s/p watchman procedure  - not on AC  - A-paced on telemetry.

## 2025-05-24 NOTE — PROGRESS NOTE ADULT - NS ATTEND AMEND GEN_ALL_CORE FT
-      65 y/o M with significant PMH ESRD s/p renal transplant on HD Tu/Th/Sat, type A aortic dissection repair (2010), ischemic bowel resection, CAD s/p CABG x 2 (SVG-OM1 and SVG-RPDA), bioprosthetic AVR/MVR (2020 Dr. Butler), Afib s/p ablation (2021), LAAO device (8/2024), MV endocarditis (2023), remote history of substance abuse and former smoker who presented with c/o shortness of breath. TTE this admission revealed newly reduced LV systolic function 35-40%. LHC was attempted but unable to pass through aortic root in view of prior type A dissection repair. RHC showed mildly elevated filling pressures and increased CO (of note, pt has AVF graft).    ACUTE SYSTOLIC HEART FAILURE  - LVEF 35-40% (newly reduced), LVIDd 4.6cm, normal RV size/fx  - Etiology: Unable to obtain LHC. S/p CTA this AM (results pending).  - Admission proBNP >50K  - VHD: prior AVR/MVR both well seated and functioning without significant regurgitation  - Arrhythmias: none  - Device: a/p atrial leadless PPM 4/15/25 for junctional bradycardia. Device interrogation showed AP 28%.  - May consider CardioMEMS however patient would have to be compliant with transmitting daily readings.  - GDMT as below:  - C/W Coreg 6.25 mg po bid with parameters of holding for SBP < 110, HR< 60 BPM  - Continue to hold Entresto Hydralazine PO, torsemide, Imdur in setting of s/p hemorraghic shock  - HF to follow pt on Monday and resumption of GDMT per HF recommendations  - Fluid removal with TODAY and follow nephrology recs. for further HD sessions    RIGHT GROIN LARGE HEMATOMA POST LHC VIA RFA/ Angioseal Hemorraghic Shock  - s/p LHC was attempted on 05/22/25 vi RFA access, access sealed with angio seal   - Post R/LHC pt developed hematoma in R groin  - s/p Right Femoral Artery Repair with Hemorrhagic shock 5/23   - Large expanding hematoma on R groin  - Open cut down and evacuation of ~1000cc of fresh blood  - Vascular follows    CAD   - R and LHC was attempted on 05/22/25 but unable to pass through aortic root in view of prior type A dissection repair.   - RHC showed mildly elevated filling pressures and increased CO   - Pt had CCTA on 05/23 revealed LM: Diffuse significant calcified plaque noted with multiple areas of indeterminate stenoses. LAD: Diffuse significant calcified plaque noted with multiple areas of indeterminate stenoses. DIAGONAL BRANCHES: Not well visualized. RAMUS INTERMEDIUS: Not visualized. CIRCUMFLEX ARTERY: Diffuse significant calcified plaque noted with multiple areas of indeterminate stenoses. Obtuse marginal (OM): Not well visualized. RIGHT CORONARY ARTERY: Diffuse significant calcified plaque noted with multiple areas of indeterminate stenoses. Posterior descending artery (PDA):Not well visualized. Posterolateral branch (PL):  Not visualized.  -Medical management now in setting of s/p rt groin /thigh exploration/ hematoma evacuation and repair of CFA bleeding  -Will hold aspirin for now in setting of  rt groin /thigh vascular procedure yesterday  -Restart aspirin 81mg po daily when vascular clear the patient  -Patient was on  atorvastatin 40mg po daily HS, will restart statin

## 2025-05-24 NOTE — PROGRESS NOTE ADULT - SUBJECTIVE AND OBJECTIVE BOX
Department of Cardiology                                                                  [facility]/[place] [address] [id number]                                                            Telephone: [phone]. Fax:[phone]                                                                                CARDIOLOGY/ INTERVENTIONAL CARDIOLOGY PROGRESS NOTE          REASON FOR FOLLOW UP: Patient s/p LHC VIA RFA/ C/B RT GROIN /THIGH hematoma/ hemorrhagic shock/ S/P rt groin exploration/ hematoma evacuation repair of CFA bleeding artery POFD# 1 /HF   Evaluated the patient at bedside, aptient lying in bed, fatigued, a&ox4,  patient got extubated today at 01: 35 am, on RA now, he is currently hemodynamically stable, appears very fatigued,  rt groin with no bleeding or hematoma dressing intact, Rt groin drain in place with minimal amount of serous drainage.  Groin tender to touch, Femoral popliteal, DP/PT pulses palpable, RLE warm and perfusing    ROS  NEURO: c/o fatigue, no dizziness, motor weakness  CVS: No Cp, palpitations  RESP: No SOB/Wheezes  Vascular: c/o tender rt groin       PHYSICAL EXAM:    T(C): 36.8 (25 @ 09:15), Max: 36.8 (25 @ 01:45)  HR: 74 (25 @ 09:15) (60 - 83)  BP: 134/60 (25 @ 09:15) (89/49 - 212/81)  RR: 14 (25 @ 09:15) (12 - 24)  SpO2: 98% (25 @ 09:15) (94% - 100%)  Wt(kg): --    I&O's Summary    23 May 2025 07:01  -  24 May 2025 07:00  --------------------------------------------------------  IN: 47.5 mL / OUT: 380 mL / NET: -332.5 mL        Daily     Daily Weight in k.2 (24 May 2025 09:15)    Appearancefatigued  HEENT:   Normal oral mucosa, PERRL, EOMI	  Lymphatic: No lymphadenopathy  Cardiovascular: Normal S1 S2, No JVD, No murmurs, No edema  Respiratory: Lungs clear to auscultation	  Psychiatry: A & O x 3, Mood & affect appropriate  Gastrointestinal:  Soft, Non-tender, + BS	  Skin: No rashes, No ecchymoses, No cyanosis  Neurologic: Non-focal  Extremities: Limited motion of RLE s/p vascular procedure , No clubbing, cyanosis or edema  Vascular: Peripheral pulses palpable 2+ bilaterally  Groin tender to touch, Femoral popliteal, DP/PT pulses palpable, RLE warm and perfusing    TELEMETRY: SR        LABS:	 	    CARDIAC MARKERS:                              8.6    9.17  )-----------( 104      ( 24 May 2025 09:56 )             25.6         136  |  98  |  53.8[H]  ----------------------------<  93  4.0   |  21.0[L]  |  7.19[H]    Ca    8.8      24 May 2025 02:00  Phos  6.3       Mg     2.0         TPro  5.3[L]  /  Alb  3.2[L]  /  TBili  1.0  /  DBili  x   /  AST  11  /  ALT  10  /  AlkPhos  69

## 2025-05-24 NOTE — CHART NOTE - NSCHARTNOTEFT_GEN_A_CORE
Patient is a 64y Male with PMHx CAD s.p CABG + PPM (04/2025), TAVR, MR repair bioprosthetic,  ESRD (HD TTS via LUE graft), Afib s/p ablation + LAAO, Type A Aortic dissection s/p repair, seizure d/o admitted for ZUNIGA 2/2 ADHF. Pt follows w/ Dr. Frankel. Pt has stopped taking AC PTA.   Pt  presented to the ED on 5/19 with SOB x 2 days, bp 203/106, HR 98, RR 22, O2 95% on room air, T 36.4C in triage. Cardiology consulted for dyspnea + Nephrology consulted for HD resumption. 4 beats of non sustained VT noted on Tele 5/20. Vascular consulted for LUE AVF patency, rec OP f/u. 5/22, RHC via RFV showing mildly elevated filling pressures with CI 5.6L. LHC aborted due to inability to pass catheters to prox ascending aorta, rec CTA. Advanced HF consulted, rec possible Cardiomems + increase Coreg to 12.5mg BID + Entresto 24-26mg BID. 5/23, R groin hematoma noted s/p angio seal. s/p R femoral artery repair per vascular 5/23. Required 4 prbc, 2 ffp, 1 plt. Extubated overnight 5/24. HD session 5/24.                             8.6    9.17  )-----------( 104      ( 24 May 2025 09:56 )             25.6       Comprehensive Metabolic Panel (05.24.25 @ 02:00)   Sodium: 136 mmol/L  Potassium: 4.0 mmol/L  Chloride: 98: Chloride reference range changed from ..10/26/2022 mmol/L  Carbon Dioxide: 21.0 mmol/L  Anion Gap: 17 mmol/L  Blood Urea Nitrogen: 53.8 mg/dL  Creatinine: 7.19 mg/dL  Glucose: 93 mg/dL  Calcium: 8.8 mg/dL  Protein Total: 5.3 g/dL  Albumin: 3.2 g/dL  Bilirubin Total: 1.0 mg/dL  Alkaline Phosphatase: 69 U/L  Aspartate Aminotransferase (AST/SGOT): 11 U/L  Alanine Aminotransferase (ALT/SGPT): 10 U/L  eGFR: 8    MANAGEMENT:  Patient is a 64y Male with PMHx CAD s.p CABG + PPM (04/2025), TAVR, MR repair bioprosthetic,  ESRD (HD TTS via LUE graft), Afib s/p ablation + LAAO, Type A Aortic dissection s/p repair, seizure d/o admitted for ZUNIGA 2/2 ADHF. Hospital course c/b R femoral aneurysm via cardiac cath s/p vascular cut down.       #Neuro   -no active issues       #Cards   -Coreg 6.25mg q12   -hydralazine 10mg q3 prn   -albumin 100cc for intra-dialytic hypotension   -s/p RHC showing slightly elevated R/L filling pressures   -s/p LHC c/b impassable coronary artery   -s/p CTA 5/23 showing diffuse aortic root atherosclerotic lesion   -Cardiology following       #Pulm  -no active issues  -on room air       #   #BPH   -Tamsulosin 0.4mg bedtime   -HD TTS via LUE graft  -HD 5/24   -Nephro following    #GI  -DASH diet       #Heme   -Paused AC PTA   -s/p MTP       Dispo: Stable for downgrade to [employer] on Tele.

## 2025-05-24 NOTE — PROGRESS NOTE ADULT - ASSESSMENT
65 yo M with PMH with hx of ESRD on HD, A. fib s/p ablation, LAAO device, CAD s/p CABG x2, bioprosthetic AVR/MR repair, MV endocarditis 2023, type A aortic dissection s/p repair w/ ischemic bowel s/p resection, hx substance abuse, presenting with shortness of breath.    ESRD: CHF r/o cardiac event  HTN  - HD today and will cont on a TTS schedule  - Overloaded UF removal on HD as tolerated  - cont current antihypertensive regimen including Torsemide  -  s/p RHC/ LHC    Anemia: +CKD; Tsat 20%  - cont REINALDO at HD  - may need PRBCs    RO: serum phos ok  - low phos diet  - monitor off binders for now    Will follow

## 2025-05-24 NOTE — PROGRESS NOTE ADULT - ASSESSMENT
MICU Transfer/ Hospital course:   63 y/o M w/ PMH of ESRD s/p renal transplant on HD Tu/Th/Sat, type A aortic dissection s/p repair (2010), ischemic bowel resection, CAD s/p CABG x 2 (SVG-OM1and SVG-RPDA), bioprosthetic AVR/MVR (2020 w/ Dr. Butler), AF s/p ablation (2021) and LAAO device (8/2024), MV endocarditis (2023), seizure disorder, BPH, remote hx of substance abuse and former smoker initially presented to [facility] c/o SOB and was admitted 5/19/25 to medicine for new acute CHF exacerbation.  TTE on 5/19/25 was significant for newly reduced LV EF 35-40%.  Pt underwent RHC/LHC on 5/22/25 via RFA.  RHC showed mildly elevated filling pressures and increased CO.  During LHC was unable to pass through aortic root due to prior type A dissection repair.  Hospital course complicated by pt developing Rt groin hematoma on 05/23 noted in AM.  Cardio/cath lab called.  Initially manual pressure applied and fem stop placed but hematoma reoccurred/expanded refractory to fem stop.  Vascular was consulted and stat CTA RLE was ordered which showed a massive hematoma and pt was urgently taken to OR for exploration/ evacuation of  RLE hematoma,  Pt received 1 unit PRBC pre OR but during OR pt went into hemorrhagic shock and MTP was initiated and pt was subsequently given another 4u pRBC, 2u FFP and 1u plt in OR.  Pt s/p open cut-down and evacuation of 1L of fresh blood, femoral artery behind profunda vein was ligated and transected, 3mm arteriotomy w/ active bleeding at common femoral artery bifurcation repaired with suture, bleeding stopped and Rt groin DEDRICK drain placed.  Given pt received significant amount of blood products pre/intra-op decision was made to leave pt intubated for resp failure 2/2 vol overload from CHF but did not require pressor support.  Pt was transferred to MICU.  Pt was successfully extubated early morning of 5/24 and is currently on room air.  H/H w/ slight down trend this morning but otherwise is hemodynamically and cleared for down grade to medicine for continued monitoring.       MICU Transfer/ Hospital course:   65 y/o M w/ PMH of ESRD s/p renal transplant on HD Tu/Th/Sat, type A aortic dissection s/p repair (2010), ischemic bowel resection, CAD s/p CABG x 2 (SVG-OM1and SVG-RPDA), bioprosthetic AVR/MVR (2020 w/ Dr. Butler), AF s/p ablation (2021) and LAAO device (8/2024), MV endocarditis (2023), seizure disorder, BPH, remote hx of substance abuse and former smoker initially presented to [facility] c/o SOB and was admitted 5/19/25 to medicine for new acute CHF exacerbation.  TTE on 5/19/25 was significant for newly reduced LV EF 35-40%.  Pt underwent RHC/LHC on 5/22/25 via RFA.  RHC showed mildly elevated filling pressures and increased CO.  During LHC was unable to pass through aortic root due to prior type A dissection repair.  Hospital course complicated by pt developing Rt groin hematoma on 05/23 noted in AM.  Cardio/cath lab called.  Initially manual pressure applied and fem stop placed but hematoma reoccurred/expanded refractory to fem stop.  Vascular was consulted and stat CTA RLE was ordered which showed a massive hematoma and pt was urgently taken to OR for exploration/ evacuation of  RLE hematoma,  Pt received 1 unit PRBC pre OR but during OR pt went into hemorrhagic shock and MTP was initiated and pt was subsequently given another 4u pRBC, 2u FFP and 1u plt in OR.  Pt s/p open cut-down and evacuation of 1L of fresh blood, femoral artery behind profunda vein was ligated and transected, 3mm arteriotomy w/ active bleeding at common femoral artery bifurcation repaired with suture, bleeding stopped and Rt groin DEDRICK drain placed.  Given pt received significant amount of blood products pre/intra-op decision was made to leave pt intubated for resp failure 2/2 vol overload from CHF but did not require pressor support.  Pt was transferred to MICU.  Pt was successfully extubated early morning of 5/24 and is currently on room air.  H/H w/ slight down trend this morning but otherwise is hemodynamically and cleared for down grade to medicine for continued monitoring.      Hemorrhagic shock 2/2 rt groin hematoma s/p LHC VIA RFA   - No pressors required but did undergo MTP intra-op for hemorrhagic shock  - Pt s/p total of 5/2/1 prbc/ffp/plt during hospital course    - 5/23 s/p open cut-down and evacuation of 1L of fresh blood, femoral artery behind profunda vein was ligated and transected, 3mm arteriotomy w/ active bleeding at common femoral artery bifurcation, repaired with suture and cessation of bleeding  - Slight down trend in H/H this morning but pt hemodynamically stable w/ RLE warm, palpable DP/PT noted and dressing in place that has minimal amount of dry blood appreciated   - Rt groin DEDRICK drain in place w/ small amount of blood at this time    - Maintained intubated s/p OR for concern for impending resp failure 2/2 vol overload given amount of blood products transfused in short time frame in setting of rEF   - pt successfully extubated AM of 5/24 and currently satting well ORA  - Monitor drain output   - Continue to trend H/h and transfuse for Hb<8   - If further transfusions required may need post transfusion diuresis or extra HD session   - Vascular surgery following        New acute decompensated HFrEF likely 2/2 CAD   - Clinically euvolemic at this time and satting well ORA  - TTE 5/19/25: LVEF 35-40%, mod to severe LVH, normal RV size/function, well seated normal functioning bioprosthetic AV, well seated normal functioning bioprosthetic MV w/ trace intravalvular regurgitation, PASP 83 mmHg (RAP 15 mmHg)   - s/p RCH 5/22 showed mildly elevated filling pressures and increased CO  - s/p LHC 5/22 was attempted via RFA access but unable to pass through aortic root in view of prior type A dissection repair   - s/p CTA 05/23 revealed LM, LAD, RCA and circumflex were all reported to have diffuse significant calcified plaque noted w/ multiple areas of indeterminate stenoses.  Diagonal branches, OM, PDA, posterior branch and ramus intermedius were not well visualized.    - c/w coreg and statin   - Continue holding Entresto, po Hydralazine, torsemide, Imdur given s/p hemorraghic shock and resume in a few days if BP tolerates and H/H stable   - Hold ASA for now in light of hemorrhagic shock but resume once cleared by vascular   - Going for regular HD session today but if further transfusions required may need post transfusion diuresis or extra session   - Monitor daily weights, strict I/O's and fluid restrict   - Monitor on telemetry  - Cardio and HF team following       Chronic HF  - s/p watchman procedure  - Not on AC  - c/w coreg       HTN / HLD   - Continue holding Entresto Hydralazine PO, torsemide, Imdur, clonidine given s/p hemorraghic shock and resume in a few days if BP tolerates and H/H stable   - Hold ASA for now in light of hemorrhagic shock but resume once cleared by vascular   - Pt was on amlodipine but now d/c'd in light of rEF      End stage renal disease  - c/w HD as per nephro   - Nephro following       Seizure disorder  - c/w keppra       BPH  - c/w flomax        VTE ppx: SCDs given pt s/p hemorrhagic shock.

## 2025-05-24 NOTE — AIRWAY REMOVAL NOTE  ADULT & PEDS - ARTIFICAL AIRWAY REMOVAL COMMENTS
Written order for extubation verified. The patient was identified by full name and birth date compared to the identification band. RN Ray present during the procedure.

## 2025-05-24 NOTE — PROGRESS NOTE ADULT - PROBLEM SELECTOR PLAN 1
# 1 PROBLEM   -Right groin large hematoma post LHC VIA RFA/ Angioseal Hemorraghic Shock    PLAN    - Patient s/p open cut-down and evacuation of 1L of fresh blood, femoral artery behind profunda vein was ligated and transected, 3 mm arteriotomy w/ active bleeding at common femoral artery bifurcation, repaired with suture and cessation of bleeding.   Palpation DP/PT on RLE. Left 15 [place]ian  Zhao drain in medial  groin.   -RLE warm, perfusing,  Groin tender to touch, Femoral popliteal, DP/PT pulses palpable, RLE warm and perfusing  - Patient required transfusion of multiple blood products in OR due to hemorraghic shock  - Currently hemodynamically stable overnight, s/p extubation overnight  - Monitor vitals per ICU protocol  - Post op management of rt groin/thigh per vascular, appreciate vascular input and interventions  - Monitor drain output per icu protocol  -NV check of rtLE per ICU protocol

## 2025-05-24 NOTE — PROGRESS NOTE ADULT - SUBJECTIVE AND OBJECTIVE BOX
NEPHROLOGY INTERVAL HPI/OVERNIGHT EVENTS:    Examined in MICU  Frail lethargic  + ZUNIGA    MEDICATIONS  (STANDING):  carvedilol 6.25 milliGRAM(s) Oral every 12 hours  chlorhexidine 2% Cloths 1 Application(s) Topical <User Schedule>  levETIRAcetam 500 milliGRAM(s) Oral two times a day  tamsulosin 0.4 milliGRAM(s) Oral at bedtime    MEDICATIONS  (PRN):  hydrALAZINE Injectable 10 milliGRAM(s) IV Push every 3 hours PRN sbp>160  sodium chloride 0.9% lock flush 10 milliLiter(s) IV Push every 1 hour PRN Pre/post blood products, medications, blood draw, and to maintain line patency      Allergies    penicillin (Other)    Intolerances        Vital Signs Last 24 Hrs  T(C): 36.6 (24 May 2025 08:00), Max: 36.8 (24 May 2025 01:45)  T(F): 97.9 (24 May 2025 08:00), Max: 98.2 (24 May 2025 01:45)  HR: 71 (24 May 2025 08:00) (60 - 83)  BP: 113/50 (24 May 2025 08:00) (89/49 - 212/81)  BP(mean): 69 (24 May 2025 08:00) (62 - 123)  RR: 12 (24 May 2025 08:00) (12 - 24)  SpO2: 94% (24 May 2025 08:00) (94% - 100%)    Parameters below as of 24 May 2025 08:00  Patient On (Oxygen Delivery Method): room air      Daily     Daily Weight in k.8 (24 May 2025 01:29)    PHYSICAL EXAM:  GENERAL: Appears acutely and chronically ill  HEENT: Moist MMs  NECK: Supple, No JVD  NERVOUS SYSTEM:  A/O x3  CHEST: Clear, diminished BS  HEART:  RRR, no rub  ABDOMEN: Soft, NT/ND BS+  EXTREMITIES: Tr dependent edema    LABS:                        9.3    9.45  )-----------( 117      ( 24 May 2025 02:00 )             27.9     05-24    136  |  98  |  53.8[H]  ----------------------------<  93  4.0   |  21.0[L]  |  7.19[H]    Ca    8.8      24 May 2025 02:00  Phos  6.3       Mg     2.0         TPro  5.3[L]  /  Alb  3.2[L]  /  TBili  1.0  /  DBili  x   /  AST  11  /  ALT  10  /  AlkPhos  69      PT/INR - ( 23 May 2025 20:29 )   PT: 15.0 sec;   INR: 1.30 ratio         PTT - ( 23 May 2025 20:29 )  PTT:31.9 sec  Urinalysis Basic - ( 24 May 2025 02:00 )    Color: x / Appearance: x / SG: x / pH: x  Gluc: 93 mg/dL / Ketone: x  / Bili: x / Urobili: x   Blood: x / Protein: x / Nitrite: x   Leuk Esterase: x / RBC: x / WBC x   Sq Epi: x / Non Sq Epi: x / Bacteria: x      Magnesium: 2.0 mg/dL ( @ 02:00)  Phosphorus: 6.3 mg/dL ( @ 02:00)  Magnesium: 2.1 mg/dL ( @ 20:29)  Phosphorus: 5.6 mg/dL ( @ 20:29)    ABG - ( 23 May 2025 20:18 )  pH, Arterial: 7.370 pH, Blood: x     /  pCO2: 45    /  pO2: 112   / HCO3: 26    / Base Excess: 0.7   /  SaO2: 99.3                  RADIOLOGY & ADDITIONAL TESTS:

## 2025-05-24 NOTE — PROGRESS NOTE ADULT - ATTENDING COMMENTS
64 M w/ ESRD s/p failed renal transplant on HD T/T/S, type A aortic dissection repair (2010), Ischemic bowel resection, CAD s/p CABG x 2, bioprosthetic AVR/MR (2020 Dr. Butler), Afib s/p ablation (2021), LAAO device (8/2024), MV endocarditis in 2023, admitted to hospitalist service for decompensated HFrEF 35-40%. Was unable to perform LHC due to inability to pass catheters through aortic root in view of prior type A dissection repair. CO was normal on RHC, elevated filling prressures (PA mean 35, PCWP 17, CI 5.6 L/min). On GDMT. Course c/b expanding hematoma in R groin, hemorrhagic shock, emergent transfer to OR for exploration w/ vascular surgery, s/p open cut-down and evacuation of 1L of fresh blood, femoral artery behind profunda vein was ligated and transected, 3 mm arteriotomy w/ active bleeding at common femoral artery bifurcation, repaired with suture and cessation of bleeding and 15Fr DEDIRCK drain left in groin, some bloody output present but stabilized.     Site looks stable compared to prior. He was extubated overnight. He has some pain in the area responsive to dilaudid. On nasal cannula, getting HD today and BP remain borderline. Will have to caution with re-initiation of GDMT after his acute bleeding issues have resolved. Can transfer out of ICU today. Cardiology/vascular follow up on floors.

## 2025-05-24 NOTE — CHART NOTE - NSCHARTNOTEFT_GEN_A_CORE
LIJ CVC removed.   Pt placed in Trendelenburg position.   Pressure held for 10 mins.   Gauze + Dressing applied.

## 2025-05-24 NOTE — PROGRESS NOTE ADULT - SUBJECTIVE AND OBJECTIVE BOX
HPI  Patient is a 64y Male with PMHx CAD s.p CABG + PPM (04/2025), TAVR, MR repair bioprosthetic,  ESRD (HD TTS via LUE graft), Afib s/p ablation + LAAO, Type A Aortic dissection s/p repair, seizure d/o   admitted for. Pt follows w/ Dr. Frankel. Pt has stopped taking AC.     INTERVAL EVENTS    HOSPITAL COURSE  Pt  presented to the ED on 5/19 with SOB x 2 days, bp 203/106, HR 98, RR 22, O2 95% on room air, T 36.4C in triage.     REVIEW OF SYSTEMS   General: No fatigue, decreased apatite, weight loss  HEENT: No cough, throat pain, rhinorrhea hemoptysis    Chest: No shortness of breath, chest pain  Abdomen: No abdominal pain, hematemesis   Genitourinary: No dysuria, No hematuria   Extremities: No joint pain, no change in range of motion  Skin: No rashes, ecchymoses purpura, nodules       MEDICATIONS  MEDICATIONS  (STANDING):  carvedilol 6.25 milliGRAM(s) Oral every 12 hours  chlorhexidine 2% Cloths 1 Application(s) Topical <User Schedule>  levETIRAcetam 500 milliGRAM(s) Oral two times a day  tamsulosin 0.4 milliGRAM(s) Oral at bedtime    MEDICATIONS  (PRN):  hydrALAZINE Injectable 10 milliGRAM(s) IV Push every 3 hours PRN sbp>160  sodium chloride 0.9% lock flush 10 milliLiter(s) IV Push every 1 hour PRN Pre/post blood products, medications, blood draw, and to maintain line patency      ALLERGIES  Allergies    penicillin (Other)    Intolerances        PHYSICAL EXAM   Vital Signs Last 24 Hrs  T(C): 36.5 (24 May 2025 07:00), Max: 36.8 (24 May 2025 01:45)  T(F): 97.7 (24 May 2025 07:00), Max: 98.2 (24 May 2025 01:45)  HR: 70 (24 May 2025 07:00) (60 - 83)  BP: 124/51 (24 May 2025 07:00) (89/49 - 212/81)  BP(mean): 74 (24 May 2025 07:00) (62 - 123)  RR: 24 (24 May 2025 07:00) (12 - 24)  SpO2: 94% (24 May 2025 07:00) (94% - 100%)    Parameters below as of 24 May 2025 04:00  Patient On (Oxygen Delivery Method): room air        T(C): 36.5 (05-24-25 @ 07:00), Max: 36.8 (05-24-25 @ 01:45)  HR: 70 (05-24-25 @ 07:00) (60 - 83)  BP: 124/51 (05-24-25 @ 07:00) (89/49 - 212/81)  RR: 24 (05-24-25 @ 07:00) (12 - 24)  SpO2: 94% (05-24-25 @ 07:00) (94% - 100%)    CONSTITUTIONAL: Well groomed, no apparent distress  EYES: PERRLA and symmetric, EOMI, No conjunctival or scleral injection, non-icteric  ENMT: Oral mucosa with moist membranes. Normal dentition; no pharyngeal injection or exudates             NECK: Supple, symmetric and without tracheal deviation   RESP: No respiratory distress, no use of accessory muscles; CTA b/l, no WRR  CV: RRR, +S1S2, no MRG; no JVD; no peripheral edema  GI: Soft, NT, ND, no rebound, no guarding; no palpable masses; no hepatosplenomegaly; no hernia palpated  LYMPH: No cervical LAD or tenderness; no axillary LAD or tenderness; no inguinal LAD or tenderness  MSK: Normal gait; No digital clubbing or cyanosis; examination of the (head/neck/spine/ribs/pelvis, RUE, LUE, RLE, LLE) without misalignment,            Normal ROM without pain, no spinal tenderness, normal muscle strength/tone  SKIN: No rashes or ulcers noted; no subcutaneous nodules or induration palpable  NEURO: CN II-XII intact; normal reflexes in upper and lower extremities, sensation intact in upper and lower extremities b/l to light touch   PSYCH: Appropriate insight/judgment; A+O x 3, mood and affect appropriate, recent/remote memory intact      LABS                        9.3    9.45  )-----------( 117      ( 24 May 2025 02:00 )             27.9       CBC Full  -  ( 24 May 2025 02:00 )  WBC Count : 9.45 K/uL  RBC Count : 3.03 M/uL  Hemoglobin : 9.3 g/dL  Hematocrit : 27.9 %  Platelet Count - Automated : 117 K/uL  Mean Cell Volume : 92.1 fl  Mean Cell Hemoglobin : 30.7 pg  Mean Cell Hemoglobin Concentration : 33.3 g/dL  Auto Neutrophil # : 7.76 K/uL  Auto Lymphocyte # : 0.71 K/uL  Auto Monocyte # : 0.54 K/uL  Auto Eosinophil # : 0.38 K/uL  Auto Basophil # : 0.03 K/uL  Auto Neutrophil % : 82.2 %  Auto Lymphocyte % : 7.5 %  Auto Monocyte % : 5.7 %  Auto Eosinophil % : 4.0 %  Auto Basophil % : 0.3 %      05-24    136  |  98  |  53.8[H]  ----------------------------<  93  4.0   |  21.0[L]  |  7.19[H]    Ca    8.8      24 May 2025 02:00  Phos  6.3     05-24  Mg     2.0     05-24    TPro  5.3[L]  /  Alb  3.2[L]  /  TBili  1.0  /  DBili  x   /  AST  11  /  ALT  10  /  AlkPhos  69  05-24      CAPILLARY BLOOD GLUCOSE          PT/INR - ( 23 May 2025 20:29 )   PT: 15.0 sec;   INR: 1.30 ratio         PTT - ( 23 May 2025 20:29 )  PTT:31.9 sec    Urinalysis Basic - ( 24 May 2025 02:00 )    Color: x / Appearance: x / SG: x / pH: x  Gluc: 93 mg/dL / Ketone: x  / Bili: x / Urobili: x   Blood: x / Protein: x / Nitrite: x   Leuk Esterase: x / RBC: x / WBC x   Sq Epi: x / Non Sq Epi: x / Bacteria: x        IMAGING          HPI  Patient is a 64y Male with PMHx CAD s.p CABG + PPM (04/2025), TAVR, MR repair bioprosthetic,  ESRD (HD TTS via LUE graft), Afib s/p ablation + LAAO, Type A Aortic dissection s/p repair, seizure d/o admitted for. Pt follows w/ Dr. Frankel. Pt has stopped taking AC.     INTERVAL EVENTS    HOSPITAL COURSE  Pt  presented to the ED on 5/19 with SOB x 2 days, bp 203/106, HR 98, RR 22, O2 95% on room air, T 36.4C in triage. Cardiology consulted for dyspnea.     REVIEW OF SYSTEMS   General: No fatigue, decreased apatite, weight loss  HEENT: No cough, throat pain, rhinorrhea hemoptysis    Chest: No shortness of breath, chest pain  Abdomen: No abdominal pain, hematemesis   Genitourinary: No dysuria, No hematuria   Extremities: No joint pain, no change in range of motion  Skin: No rashes, ecchymoses purpura, nodules       MEDICATIONS  MEDICATIONS  (STANDING):  carvedilol 6.25 milliGRAM(s) Oral every 12 hours  chlorhexidine 2% Cloths 1 Application(s) Topical <User Schedule>  levETIRAcetam 500 milliGRAM(s) Oral two times a day  tamsulosin 0.4 milliGRAM(s) Oral at bedtime    MEDICATIONS  (PRN):  hydrALAZINE Injectable 10 milliGRAM(s) IV Push every 3 hours PRN sbp>160  sodium chloride 0.9% lock flush 10 milliLiter(s) IV Push every 1 hour PRN Pre/post blood products, medications, blood draw, and to maintain line patency      ALLERGIES  Allergies    penicillin (Other)    Intolerances        PHYSICAL EXAM   Vital Signs Last 24 Hrs  T(C): 36.5 (24 May 2025 07:00), Max: 36.8 (24 May 2025 01:45)  T(F): 97.7 (24 May 2025 07:00), Max: 98.2 (24 May 2025 01:45)  HR: 70 (24 May 2025 07:00) (60 - 83)  BP: 124/51 (24 May 2025 07:00) (89/49 - 212/81)  BP(mean): 74 (24 May 2025 07:00) (62 - 123)  RR: 24 (24 May 2025 07:00) (12 - 24)  SpO2: 94% (24 May 2025 07:00) (94% - 100%)    Parameters below as of 24 May 2025 04:00  Patient On (Oxygen Delivery Method): room air        T(C): 36.5 (05-24-25 @ 07:00), Max: 36.8 (05-24-25 @ 01:45)  HR: 70 (05-24-25 @ 07:00) (60 - 83)  BP: 124/51 (05-24-25 @ 07:00) (89/49 - 212/81)  RR: 24 (05-24-25 @ 07:00) (12 - 24)  SpO2: 94% (05-24-25 @ 07:00) (94% - 100%)    CONSTITUTIONAL: Well groomed, no apparent distress  EYES: PERRLA and symmetric, EOMI, No conjunctival or scleral injection, non-icteric  ENMT: Oral mucosa with moist membranes. Normal dentition; no pharyngeal injection or exudates             NECK: Supple, symmetric and without tracheal deviation   RESP: No respiratory distress, no use of accessory muscles; CTA b/l, no WRR  CV: RRR, +S1S2, no MRG; no JVD; no peripheral edema  GI: Soft, NT, ND, no rebound, no guarding; no palpable masses; no hepatosplenomegaly; no hernia palpated  LYMPH: No cervical LAD or tenderness; no axillary LAD or tenderness; no inguinal LAD or tenderness  MSK: Normal gait; No digital clubbing or cyanosis; examination of the (head/neck/spine/ribs/pelvis, RUE, LUE, RLE, LLE) without misalignment,            Normal ROM without pain, no spinal tenderness, normal muscle strength/tone  SKIN: No rashes or ulcers noted; no subcutaneous nodules or induration palpable  NEURO: CN II-XII intact; normal reflexes in upper and lower extremities, sensation intact in upper and lower extremities b/l to light touch   PSYCH: Appropriate insight/judgment; A+O x 3, mood and affect appropriate, recent/remote memory intact      LABS                        9.3    9.45  )-----------( 117      ( 24 May 2025 02:00 )             27.9       CBC Full  -  ( 24 May 2025 02:00 )  WBC Count : 9.45 K/uL  RBC Count : 3.03 M/uL  Hemoglobin : 9.3 g/dL  Hematocrit : 27.9 %  Platelet Count - Automated : 117 K/uL  Mean Cell Volume : 92.1 fl  Mean Cell Hemoglobin : 30.7 pg  Mean Cell Hemoglobin Concentration : 33.3 g/dL  Auto Neutrophil # : 7.76 K/uL  Auto Lymphocyte # : 0.71 K/uL  Auto Monocyte # : 0.54 K/uL  Auto Eosinophil # : 0.38 K/uL  Auto Basophil # : 0.03 K/uL  Auto Neutrophil % : 82.2 %  Auto Lymphocyte % : 7.5 %  Auto Monocyte % : 5.7 %  Auto Eosinophil % : 4.0 %  Auto Basophil % : 0.3 %      05-24    136  |  98  |  53.8[H]  ----------------------------<  93  4.0   |  21.0[L]  |  7.19[H]    Ca    8.8      24 May 2025 02:00  Phos  6.3     05-24  Mg     2.0     05-24    TPro  5.3[L]  /  Alb  3.2[L]  /  TBili  1.0  /  DBili  x   /  AST  11  /  ALT  10  /  AlkPhos  69  05-24      CAPILLARY BLOOD GLUCOSE          PT/INR - ( 23 May 2025 20:29 )   PT: 15.0 sec;   INR: 1.30 ratio         PTT - ( 23 May 2025 20:29 )  PTT:31.9 sec    Urinalysis Basic - ( 24 May 2025 02:00 )    Color: x / Appearance: x / SG: x / pH: x  Gluc: 93 mg/dL / Ketone: x  / Bili: x / Urobili: x   Blood: x / Protein: x / Nitrite: x   Leuk Esterase: x / RBC: x / WBC x   Sq Epi: x / Non Sq Epi: x / Bacteria: x        IMAGING          HPI  Patient is a 64y Male with PMHx CAD s.p CABG + PPM (04/2025), TAVR, MR repair bioprosthetic,  ESRD (HD TTS via LUE graft), Afib s/p ablation + LAAO, Type A Aortic dissection s/p repair, seizure d/o admitted for. Pt follows w/ Dr. Frankel. Pt has stopped taking AC.     INTERVAL EVENTS  Extubated overnight.     HOSPITAL COURSE  Pt  presented to the ED on 5/19 with SOB x 2 days, bp 203/106, HR 98, RR 22, O2 95% on room air, T 36.4C in triage. Cardiology consulted for dyspnea + Nephrology consulted for HD resumption. 4 beasts of non sustained VT noted on Tele 5/20. Vascular consulted for LUE AVF patency, rec OP f/u. 5/22, RHC via RFV showing mildly elevated filling pressures with CI 5.6L. LHC aborted due to inability to pass catheters to prox ascending aorta, rec CTA. Advanced HF consulted, rec possible Cardiomems + increase Coreg to 12.5mg BID + Entresto 24-26mg BID. 5/23, R groin hematoma noted s/p angio seal. s/p R femoral artery repair per vascular 5/23. Required 4 prbc, 2 ffp, 1 plt.     REVIEW OF SYSTEMS   General: No fatigue, decreased apatite, weight loss  HEENT: No cough, throat pain, rhinorrhea hemoptysis    Chest: No shortness of breath, chest pain  Abdomen: No abdominal pain, hematemesis   Genitourinary: No dysuria, No hematuria   Extremities: No joint pain, no change in range of motion  Skin: No rashes, ecchymoses purpura, nodules       MEDICATIONS  MEDICATIONS  (STANDING):  carvedilol 6.25 milliGRAM(s) Oral every 12 hours  chlorhexidine 2% Cloths 1 Application(s) Topical <User Schedule>  levETIRAcetam 500 milliGRAM(s) Oral two times a day  tamsulosin 0.4 milliGRAM(s) Oral at bedtime    MEDICATIONS  (PRN):  hydrALAZINE Injectable 10 milliGRAM(s) IV Push every 3 hours PRN sbp>160  sodium chloride 0.9% lock flush 10 milliLiter(s) IV Push every 1 hour PRN Pre/post blood products, medications, blood draw, and to maintain line patency      ALLERGIES  Allergies    penicillin (Other)    Intolerances        PHYSICAL EXAM   Vital Signs Last 24 Hrs  T(C): 36.5 (24 May 2025 07:00), Max: 36.8 (24 May 2025 01:45)  T(F): 97.7 (24 May 2025 07:00), Max: 98.2 (24 May 2025 01:45)  HR: 70 (24 May 2025 07:00) (60 - 83)  BP: 124/51 (24 May 2025 07:00) (89/49 - 212/81)  BP(mean): 74 (24 May 2025 07:00) (62 - 123)  RR: 24 (24 May 2025 07:00) (12 - 24)  SpO2: 94% (24 May 2025 07:00) (94% - 100%)    Parameters below as of 24 May 2025 04:00  Patient On (Oxygen Delivery Method): room air        T(C): 36.5 (05-24-25 @ 07:00), Max: 36.8 (05-24-25 @ 01:45)  HR: 70 (05-24-25 @ 07:00) (60 - 83)  BP: 124/51 (05-24-25 @ 07:00) (89/49 - 212/81)  RR: 24 (05-24-25 @ 07:00) (12 - 24)  SpO2: 94% (05-24-25 @ 07:00) (94% - 100%)    CONSTITUTIONAL: Well groomed, no apparent distress  EYES: PERRLA and symmetric, EOMI, No conjunctival or scleral injection, non-icteric  ENMT: Oral mucosa with moist membranes. Normal dentition; no pharyngeal injection or exudates             NECK: Supple, symmetric and without tracheal deviation   RESP: No respiratory distress, no use of accessory muscles; CTA b/l, no WRR  CV: RRR, +S1S2, no MRG; no JVD; no peripheral edema  GI: Soft, NT, ND, no rebound, no guarding; no palpable masses; no hepatosplenomegaly; no hernia palpated  LYMPH: No cervical LAD or tenderness; no axillary LAD or tenderness; no inguinal LAD or tenderness  MSK: Normal gait; No digital clubbing or cyanosis; examination of the (head/neck/spine/ribs/pelvis, RUE, LUE, RLE, LLE) without misalignment,            Normal ROM without pain, no spinal tenderness, normal muscle strength/tone  SKIN: No rashes or ulcers noted; no subcutaneous nodules or induration palpable  NEURO: CN II-XII intact; normal reflexes in upper and lower extremities, sensation intact in upper and lower extremities b/l to light touch   PSYCH: Appropriate insight/judgment; A+O x 3, mood and affect appropriate, recent/remote memory intact      LABS                        9.3    9.45  )-----------( 117      ( 24 May 2025 02:00 )             27.9       CBC Full  -  ( 24 May 2025 02:00 )  WBC Count : 9.45 K/uL  RBC Count : 3.03 M/uL  Hemoglobin : 9.3 g/dL  Hematocrit : 27.9 %  Platelet Count - Automated : 117 K/uL  Mean Cell Volume : 92.1 fl  Mean Cell Hemoglobin : 30.7 pg  Mean Cell Hemoglobin Concentration : 33.3 g/dL  Auto Neutrophil # : 7.76 K/uL  Auto Lymphocyte # : 0.71 K/uL  Auto Monocyte # : 0.54 K/uL  Auto Eosinophil # : 0.38 K/uL  Auto Basophil # : 0.03 K/uL  Auto Neutrophil % : 82.2 %  Auto Lymphocyte % : 7.5 %  Auto Monocyte % : 5.7 %  Auto Eosinophil % : 4.0 %  Auto Basophil % : 0.3 %      05-24    136  |  98  |  53.8[H]  ----------------------------<  93  4.0   |  21.0[L]  |  7.19[H]    Ca    8.8      24 May 2025 02:00  Phos  6.3     05-24  Mg     2.0     05-24    TPro  5.3[L]  /  Alb  3.2[L]  /  TBili  1.0  /  DBili  x   /  AST  11  /  ALT  10  /  AlkPhos  69  05-24      CAPILLARY BLOOD GLUCOSE          PT/INR - ( 23 May 2025 20:29 )   PT: 15.0 sec;   INR: 1.30 ratio         PTT - ( 23 May 2025 20:29 )  PTT:31.9 sec    Urinalysis Basic - ( 24 May 2025 02:00 )    Color: x / Appearance: x / SG: x / pH: x  Gluc: 93 mg/dL / Ketone: x  / Bili: x / Urobili: x   Blood: x / Protein: x / Nitrite: x   Leuk Esterase: x / RBC: x / WBC x   Sq Epi: x / Non Sq Epi: x / Bacteria: x        IMAGING          HPI  Patient is a 64y Male with PMHx CAD s.p CABG + PPM (04/2025), TAVR, MR repair bioprosthetic,  ESRD (HD TTS via LUE graft), Afib s/p ablation + LAAO, Type A Aortic dissection s/p repair, seizure d/o admitted for ZUNIGA 2/2 ADHF. Pt follows w/ Dr. Frankel. Pt has stopped taking AC PTA.     INTERVAL EVENTS  Extubated overnight. HD session 5/24.     HOSPITAL COURSE  Pt  presented to the ED on 5/19 with SOB x 2 days, bp 203/106, HR 98, RR 22, O2 95% on room air, T 36.4C in triage. Cardiology consulted for dyspnea + Nephrology consulted for HD resumption. 4 beats of non sustained VT noted on Tele 5/20. Vascular consulted for LUE AVF patency, rec OP f/u. 5/22, RHC via RFV showing mildly elevated filling pressures with CI 5.6L. LHC aborted due to inability to pass catheters to prox ascending aorta, rec CTA. Advanced HF consulted, rec possible Cardiomems + increase Coreg to 12.5mg BID + Entresto 24-26mg BID. 5/23, R groin hematoma noted s/p angio seal. s/p R femoral artery repair per vascular 5/23. Required 4 prbc, 2 ffp, 1 plt.     REVIEW OF SYSTEMS Endorses groin pain   General: No fatigue, decreased apatite, weight loss  HEENT: No cough, throat pain, rhinorrhea hemoptysis    Chest: No shortness of breath, chest pain  Abdomen: No abdominal pain, hematemesis   Genitourinary: No dysuria, No hematuria   Extremities: No joint pain, no change in range of motion  Skin: No rashes, ecchymoses purpura, nodules       MEDICATIONS  MEDICATIONS  (STANDING):  carvedilol 6.25 milliGRAM(s) Oral every 12 hours  chlorhexidine 2% Cloths 1 Application(s) Topical <User Schedule>  levETIRAcetam 500 milliGRAM(s) Oral two times a day  tamsulosin 0.4 milliGRAM(s) Oral at bedtime    MEDICATIONS  (PRN):  hydrALAZINE Injectable 10 milliGRAM(s) IV Push every 3 hours PRN sbp>160  sodium chloride 0.9% lock flush 10 milliLiter(s) IV Push every 1 hour PRN Pre/post blood products, medications, blood draw, and to maintain line patency      ALLERGIES  Allergies    penicillin (Other)    Intolerances        PHYSICAL EXAM   Vital Signs Last 24 Hrs  T(C): 36.5 (24 May 2025 07:00), Max: 36.8 (24 May 2025 01:45)  T(F): 97.7 (24 May 2025 07:00), Max: 98.2 (24 May 2025 01:45)  HR: 70 (24 May 2025 07:00) (60 - 83)  BP: 124/51 (24 May 2025 07:00) (89/49 - 212/81)  BP(mean): 74 (24 May 2025 07:00) (62 - 123)  RR: 24 (24 May 2025 07:00) (12 - 24)  SpO2: 94% (24 May 2025 07:00) (94% - 100%)    Parameters below as of 24 May 2025 04:00  Patient On (Oxygen Delivery Method): room air        T(C): 36.5 (05-24-25 @ 07:00), Max: 36.8 (05-24-25 @ 01:45)  HR: 70 (05-24-25 @ 07:00) (60 - 83)  BP: 124/51 (05-24-25 @ 07:00) (89/49 - 212/81)  RR: 24 (05-24-25 @ 07:00) (12 - 24)  SpO2: 94% (05-24-25 @ 07:00) (94% - 100%)    CONSTITUTIONAL: no apparent distress  EYES: PERRLA and symmetric, EOMI, No conjunctival or scleral injection, non-icteric  ENMT: Oral mucosa with moist membranes.   NECK: symmetric and without tracheal deviation   RESP: No respiratory distress, no use of accessory muscles; CTA b/l, no WRR  CV: RRR, +S1S2, no MRG; no JVD; no peripheral edema  GI: Soft, NT, ND, no rebound, no guarding  MSK: No digital clubbing or cyanosis  NEURO: sensation intact in upper and lower extremities b/l to light touch   PSYCH: Appropriate insight/judgment; A+O x 4, mood and affect appropriate, recent/remote memory intact      LABS                        9.3    9.45  )-----------( 117      ( 24 May 2025 02:00 )             27.9       CBC Full  -  ( 24 May 2025 02:00 )  WBC Count : 9.45 K/uL  RBC Count : 3.03 M/uL  Hemoglobin : 9.3 g/dL  Hematocrit : 27.9 %  Platelet Count - Automated : 117 K/uL  Mean Cell Volume : 92.1 fl  Mean Cell Hemoglobin : 30.7 pg  Mean Cell Hemoglobin Concentration : 33.3 g/dL  Auto Neutrophil # : 7.76 K/uL  Auto Lymphocyte # : 0.71 K/uL  Auto Monocyte # : 0.54 K/uL  Auto Eosinophil # : 0.38 K/uL  Auto Basophil # : 0.03 K/uL  Auto Neutrophil % : 82.2 %  Auto Lymphocyte % : 7.5 %  Auto Monocyte % : 5.7 %  Auto Eosinophil % : 4.0 %  Auto Basophil % : 0.3 %      05-24    136  |  98  |  53.8[H]  ----------------------------<  93  4.0   |  21.0[L]  |  7.19[H]    Ca    8.8      24 May 2025 02:00  Phos  6.3     05-24  Mg     2.0     05-24    TPro  5.3[L]  /  Alb  3.2[L]  /  TBili  1.0  /  DBili  x   /  AST  11  /  ALT  10  /  AlkPhos  69  05-24      CAPILLARY BLOOD GLUCOSE          PT/INR - ( 23 May 2025 20:29 )   PT: 15.0 sec;   INR: 1.30 ratio         PTT - ( 23 May 2025 20:29 )  PTT:31.9 sec    Urinalysis Basic - ( 24 May 2025 02:00 )    Color: x / Appearance: x / SG: x / pH: x  Gluc: 93 mg/dL / Ketone: x  / Bili: x / Urobili: x   Blood: x / Protein: x / Nitrite: x   Leuk Esterase: x / RBC: x / WBC x   Sq Epi: x / Non Sq Epi: x / Bacteria: x        IMAGING          HPI  Patient is a 64y Male with PMHx CAD s.p CABG + PPM (04/2025), TAVR, MR repair bioprosthetic,  ESRD (HD TTS via LUE graft), Afib s/p ablation + LAAO, Type A Aortic dissection s/p repair, seizure d/o admitted for ZUNIGA 2/2 ADHF. Pt follows w/ Dr. Frankel. Pt has stopped taking AC PTA.     INTERVAL EVENTS  Extubated overnight. HD session 5/24.     HOSPITAL COURSE  Pt  presented to the ED on 5/19 with SOB x 2 days, bp 203/106, HR 98, RR 22, O2 95% on room air, T 36.4C in triage. Cardiology consulted for dyspnea + Nephrology consulted for HD resumption. 4 beats of non sustained VT noted on Tele 5/20. Vascular consulted for LUE AVF patency, rec OP f/u. 5/22, RHC via RFV showing mildly elevated filling pressures with CI 5.6L. LHC aborted due to inability to pass catheters to prox ascending aorta, rec CTA. Advanced HF consulted, rec possible Cardiomems + increase Coreg to 12.5mg BID + Entresto 24-26mg BID. 5/23, R groin hematoma noted s/p angio seal. s/p R femoral artery repair per vascular 5/23. Required 4 prbc, 2 ffp, 1 plt.     REVIEW OF SYSTEMS Endorses groin pain   General: No fatigue, decreased apatite, weight loss  HEENT: No cough, throat pain, rhinorrhea hemoptysis    Chest: No shortness of breath, chest pain  Abdomen: No abdominal pain, hematemesis   Genitourinary: No dysuria, No hematuria   Extremities: No joint pain, no change in range of motion  Skin: No rashes, ecchymoses purpura, nodules       MEDICATIONS  MEDICATIONS  (STANDING):  carvedilol 6.25 milliGRAM(s) Oral every 12 hours  chlorhexidine 2% Cloths 1 Application(s) Topical <User Schedule>  levETIRAcetam 500 milliGRAM(s) Oral two times a day  tamsulosin 0.4 milliGRAM(s) Oral at bedtime    MEDICATIONS  (PRN):  hydrALAZINE Injectable 10 milliGRAM(s) IV Push every 3 hours PRN sbp>160  sodium chloride 0.9% lock flush 10 milliLiter(s) IV Push every 1 hour PRN Pre/post blood products, medications, blood draw, and to maintain line patency      ALLERGIES  Allergies    penicillin (Other)    Intolerances        PHYSICAL EXAM   Vital Signs Last 24 Hrs  T(C): 36.5 (24 May 2025 07:00), Max: 36.8 (24 May 2025 01:45)  T(F): 97.7 (24 May 2025 07:00), Max: 98.2 (24 May 2025 01:45)  HR: 70 (24 May 2025 07:00) (60 - 83)  BP: 124/51 (24 May 2025 07:00) (89/49 - 212/81)  BP(mean): 74 (24 May 2025 07:00) (62 - 123)  RR: 24 (24 May 2025 07:00) (12 - 24)  SpO2: 94% (24 May 2025 07:00) (94% - 100%)    Parameters below as of 24 May 2025 04:00  Patient On (Oxygen Delivery Method): room air        T(C): 36.5 (05-24-25 @ 07:00), Max: 36.8 (05-24-25 @ 01:45)  HR: 70 (05-24-25 @ 07:00) (60 - 83)  BP: 124/51 (05-24-25 @ 07:00) (89/49 - 212/81)  RR: 24 (05-24-25 @ 07:00) (12 - 24)  SpO2: 94% (05-24-25 @ 07:00) (94% - 100%)    CONSTITUTIONAL: no apparent distress  EYES: PERRLA and symmetric, EOMI, No conjunctival or scleral injection, non-icteric  ENMT: Oral mucosa with moist membranes.   NECK: symmetric and without tracheal deviation   RESP: No respiratory distress, no use of accessory muscles; CTA b/l, no WRR  CV: RRR, +S1S2, no MRG; no JVD; no peripheral edema. DEDRICK drain to RFA   GI: Soft, NT, ND, no rebound, no guarding  MSK: No digital clubbing or cyanosis  NEURO: sensation intact in upper and lower extremities b/l to light touch   PSYCH: Appropriate insight/judgment; A+O x 4, mood and affect appropriate, recent/remote memory intact      LABS                        9.3    9.45  )-----------( 117      ( 24 May 2025 02:00 )             27.9       CBC Full  -  ( 24 May 2025 02:00 )  WBC Count : 9.45 K/uL  RBC Count : 3.03 M/uL  Hemoglobin : 9.3 g/dL  Hematocrit : 27.9 %  Platelet Count - Automated : 117 K/uL  Mean Cell Volume : 92.1 fl  Mean Cell Hemoglobin : 30.7 pg  Mean Cell Hemoglobin Concentration : 33.3 g/dL  Auto Neutrophil # : 7.76 K/uL  Auto Lymphocyte # : 0.71 K/uL  Auto Monocyte # : 0.54 K/uL  Auto Eosinophil # : 0.38 K/uL  Auto Basophil # : 0.03 K/uL  Auto Neutrophil % : 82.2 %  Auto Lymphocyte % : 7.5 %  Auto Monocyte % : 5.7 %  Auto Eosinophil % : 4.0 %  Auto Basophil % : 0.3 %      05-24    136  |  98  |  53.8[H]  ----------------------------<  93  4.0   |  21.0[L]  |  7.19[H]    Ca    8.8      24 May 2025 02:00  Phos  6.3     05-24  Mg     2.0     05-24    TPro  5.3[L]  /  Alb  3.2[L]  /  TBili  1.0  /  DBili  x   /  AST  11  /  ALT  10  /  AlkPhos  69  05-24      CAPILLARY BLOOD GLUCOSE          PT/INR - ( 23 May 2025 20:29 )   PT: 15.0 sec;   INR: 1.30 ratio         PTT - ( 23 May 2025 20:29 )  PTT:31.9 sec    Urinalysis Basic - ( 24 May 2025 02:00 )    Color: x / Appearance: x / SG: x / pH: x  Gluc: 93 mg/dL / Ketone: x  / Bili: x / Urobili: x   Blood: x / Protein: x / Nitrite: x   Leuk Esterase: x / RBC: x / WBC x   Sq Epi: x / Non Sq Epi: x / Bacteria: x        IMAGING

## 2025-05-24 NOTE — PROGRESS NOTE ADULT - ASSESSMENT
Patient is a 64y Male with PMHx CAD s.p CABG + PPM (04/2025), TAVR, MR repair bioprosthetic,  ESRD (HD TTS via LUE graft), Afib s/p ablation + LAAO, Type A Aortic dissection s/p repair, seizure d/o admitted for ZUNIGA 2/2 ADHF. Hospital course c/b R femoral aneurysm via cardiac cath s/p vascular cut down.       #Neuro   -no active issues       #Cards   -Coreg 6.25mg q12   -hydralazine 10mg q3 prn   -albumin 100cc for intra-dialytic hypotension   -s/p RHC showing slightly elevated R/L filling pressures   -s/p LHC c/b impassable coronary artery       #Pulm  -no active issues  -on room air       #   #BPH   -Tamsulosin 0.4mg bedtime   -HD TTS via LUE graft  -HD 5/24     #GI  -    #Heme   -Paused AC PTA   -s/p MTP  Patient is a 64y Male with PMHx CAD s.p CABG + PPM (04/2025), TAVR, MR repair bioprosthetic,  ESRD (HD TTS via LUE graft), Afib s/p ablation + LAAO, Type A Aortic dissection s/p repair, seizure d/o admitted for ZUNIGA 2/2 ADHF. Hospital course c/b R femoral aneurysm via cardiac cath s/p vascular cut down.       #Neuro   -no active issues       #Cards   -Coreg 6.25mg q12   -hydralazine 10mg q3 prn   -albumin 100cc for intra-dialytic hypotension   -s/p RHC showing slightly elevated R/L filling pressures   -s/p LHC c/b impassable coronary artery   -s/p CTA 5/23 showing diffuse aortic root atherosclerotic lesion   -Cardiology following       #Pulm  -no active issues  -on room air       #   #BPH   -Tamsulosin 0.4mg bedtime   -HD TTS via LUE graft  -HD 5/24   -Nephro following    #GI  -DASH diet     #Heme   -Paused AC PTA   -s/p MTP

## 2025-05-25 LAB
ALBUMIN SERPL ELPH-MCNC: 3.7 G/DL — SIGNIFICANT CHANGE UP (ref 3.3–5.2)
ALP SERPL-CCNC: 71 U/L — SIGNIFICANT CHANGE UP (ref 40–120)
ALT FLD-CCNC: <5 U/L — SIGNIFICANT CHANGE UP
ANION GAP SERPL CALC-SCNC: 20 MMOL/L — HIGH (ref 5–17)
AST SERPL-CCNC: 10 U/L — SIGNIFICANT CHANGE UP
BILIRUB SERPL-MCNC: 1.1 MG/DL — SIGNIFICANT CHANGE UP (ref 0.4–2)
BUN SERPL-MCNC: 36.2 MG/DL — HIGH (ref 8–20)
CALCIUM SERPL-MCNC: 8.7 MG/DL — SIGNIFICANT CHANGE UP (ref 8.4–10.5)
CHLORIDE SERPL-SCNC: 97 MMOL/L — SIGNIFICANT CHANGE UP (ref 96–108)
CO2 SERPL-SCNC: 22 MMOL/L — SIGNIFICANT CHANGE UP (ref 22–29)
CREAT SERPL-MCNC: 5.77 MG/DL — HIGH (ref 0.5–1.3)
EGFR: 10 ML/MIN/1.73M2 — LOW
EGFR: 10 ML/MIN/1.73M2 — LOW
GLUCOSE SERPL-MCNC: 85 MG/DL — SIGNIFICANT CHANGE UP (ref 70–99)
HCT VFR BLD CALC: 26.8 % — LOW (ref 39–50)
HGB BLD-MCNC: 8.5 G/DL — LOW (ref 13–17)
MAGNESIUM SERPL-MCNC: 1.9 MG/DL — SIGNIFICANT CHANGE UP (ref 1.6–2.6)
MCHC RBC-ENTMCNC: 30.5 PG — SIGNIFICANT CHANGE UP (ref 27–34)
MCHC RBC-ENTMCNC: 31.7 G/DL — LOW (ref 32–36)
MCV RBC AUTO: 96.1 FL — SIGNIFICANT CHANGE UP (ref 80–100)
NRBC # BLD AUTO: 0 K/UL — SIGNIFICANT CHANGE UP (ref 0–0)
NRBC # FLD: 0 K/UL — SIGNIFICANT CHANGE UP (ref 0–0)
NRBC BLD AUTO-RTO: 0 /100 WBCS — SIGNIFICANT CHANGE UP (ref 0–0)
PHOSPHATE SERPL-MCNC: 5.8 MG/DL — HIGH (ref 2.4–4.7)
PLATELET # BLD AUTO: 106 K/UL — LOW (ref 150–400)
PMV BLD: 11.5 FL — SIGNIFICANT CHANGE UP (ref 7–13)
POTASSIUM SERPL-MCNC: 4.5 MMOL/L — SIGNIFICANT CHANGE UP (ref 3.5–5.3)
POTASSIUM SERPL-SCNC: 4.5 MMOL/L — SIGNIFICANT CHANGE UP (ref 3.5–5.3)
PROT SERPL-MCNC: 6.1 G/DL — LOW (ref 6.6–8.7)
RBC # BLD: 2.79 M/UL — LOW (ref 4.2–5.8)
RBC # FLD: 18 % — HIGH (ref 10.3–14.5)
SODIUM SERPL-SCNC: 139 MMOL/L — SIGNIFICANT CHANGE UP (ref 135–145)
WBC # BLD: 9.62 K/UL — SIGNIFICANT CHANGE UP (ref 3.8–10.5)
WBC # FLD AUTO: 9.62 K/UL — SIGNIFICANT CHANGE UP (ref 3.8–10.5)

## 2025-05-25 PROCEDURE — 99232 SBSQ HOSP IP/OBS MODERATE 35: CPT

## 2025-05-25 RX ORDER — HYDROMORPHONE/SOD CHLOR,ISO/PF 2 MG/10 ML
0.2 SYRINGE (ML) INJECTION ONCE
Refills: 0 | Status: DISCONTINUED | OUTPATIENT
Start: 2025-05-25 | End: 2025-05-25

## 2025-05-25 RX ADMIN — LEVETIRACETAM 500 MILLIGRAM(S): 10 INJECTION, SOLUTION INTRAVENOUS at 07:44

## 2025-05-25 RX ADMIN — Medication 650 MILLIGRAM(S): at 09:48

## 2025-05-25 RX ADMIN — OXYCODONE HYDROCHLORIDE 10 MILLIGRAM(S): 30 TABLET ORAL at 14:30

## 2025-05-25 RX ADMIN — OXYCODONE HYDROCHLORIDE 10 MILLIGRAM(S): 30 TABLET ORAL at 21:00

## 2025-05-25 RX ADMIN — OXYCODONE HYDROCHLORIDE 10 MILLIGRAM(S): 30 TABLET ORAL at 13:38

## 2025-05-25 RX ADMIN — Medication 0.2 MILLIGRAM(S): at 02:00

## 2025-05-25 RX ADMIN — Medication 0.2 MILLIGRAM(S): at 01:34

## 2025-05-25 RX ADMIN — Medication 650 MILLIGRAM(S): at 10:30

## 2025-05-25 RX ADMIN — Medication 1 APPLICATION(S): at 09:40

## 2025-05-25 RX ADMIN — OXYCODONE HYDROCHLORIDE 10 MILLIGRAM(S): 30 TABLET ORAL at 07:45

## 2025-05-25 RX ADMIN — TAMSULOSIN HYDROCHLORIDE 0.4 MILLIGRAM(S): 0.4 CAPSULE ORAL at 22:04

## 2025-05-25 RX ADMIN — LEVETIRACETAM 500 MILLIGRAM(S): 10 INJECTION, SOLUTION INTRAVENOUS at 20:47

## 2025-05-25 RX ADMIN — CARVEDILOL 6.25 MILLIGRAM(S): 3.12 TABLET, FILM COATED ORAL at 20:47

## 2025-05-25 RX ADMIN — ATORVASTATIN CALCIUM 40 MILLIGRAM(S): 80 TABLET, FILM COATED ORAL at 22:04

## 2025-05-25 RX ADMIN — OXYCODONE HYDROCHLORIDE 10 MILLIGRAM(S): 30 TABLET ORAL at 08:30

## 2025-05-25 RX ADMIN — OXYCODONE HYDROCHLORIDE 10 MILLIGRAM(S): 30 TABLET ORAL at 20:04

## 2025-05-25 RX ADMIN — CARVEDILOL 6.25 MILLIGRAM(S): 3.12 TABLET, FILM COATED ORAL at 07:44

## 2025-05-25 NOTE — PROGRESS NOTE ADULT - SUBJECTIVE AND OBJECTIVE BOX
[facility]                                                         CARDIOLOGY AT [facility]                                                                  [address] [facility], [place] [place] [address]                                                         Telephone: [phone]. Fax:[phone]                                                                             PROGRESS NOTE    Reason for follow up: New HFrEF, right groin hematoma evacuation  Update: has pain at groin site       Review of symptoms:   Cardiac:  No chest pain. No dyspnea. No palpitations.  Respiratory: no cough. No dyspnea  Gastrointestinal: No diarrhea. No abdominal pain. No bleeding.   Neuro: No focal neuro complaints.    Vitals:  T(C): 36.8 (05-25-25 @ 04:52), Max: 36.9 (05-24-25 @ 10:00)  HR: 91 (05-25-25 @ 04:52) (67 - 91)  BP: 150/61 (05-25-25 @ 04:52) (106/53 - 150/61)  RR: 18 (05-25-25 @ 04:52) (13 - 20)  SpO2: 93% (05-25-25 @ 04:52) (93% - 100%)  Wt(kg): --  I&O's Summary    24 May 2025 07:01  -  25 May 2025 07:00  --------------------------------------------------------  IN: 360 mL / OUT: 1335 mL / NET: -975 mL      Weight (kg): 63.5 (05-23 @ 04:24)    PHYSICAL EXAM:  Appearance: Comfortable. No acute distress  HEENT:  Atraumatic. Normocephalic.  Normal oral mucosa  Neurologic: A & O x 3, no gross focal deficits.  Cardiovascular: RRR S1 S2, +murmur, no rubs/gallops. No JVD  Respiratory: Lungs clear to auscultation, unlabored   Gastrointestinal:  Soft, Non-tender, + BS  Lower Extremities: 2+ Peripheral Pulses, No clubbing, cyanosis, or edema  Psychiatry: Patient is calm. No agitation.   Skin: warm and dry.  right groin site with bandage, patient has "burning pain". DEDRICK in place    CURRENT CARDIAC MEDICATIONS:  carvedilol 6.25 milliGRAM(s) Oral every 12 hours  hydrALAZINE Injectable 10 milliGRAM(s) IV Push every 3 hours PRN      CURRENT OTHER MEDICATIONS:  acetaminophen     Tablet .. 650 milliGRAM(s) Oral every 6 hours PRN Mild Pain (1 - 3)  levETIRAcetam 500 milliGRAM(s) Oral two times a day  ondansetron Injectable 4 milliGRAM(s) IV Push every 8 hours PRN Nausea and/or Vomiting  oxyCODONE    IR 5 milliGRAM(s) Oral every 6 hours PRN Moderate Pain (4 - 6)  oxyCODONE    IR 10 milliGRAM(s) Oral every 6 hours PRN Severe Pain (7 - 10)  atorvastatin 40 milliGRAM(s) Oral at bedtime  chlorhexidine 2% Cloths 1 Application(s) Topical <User Schedule>  sodium chloride 0.9% lock flush 10 milliLiter(s) IV Push every 1 hour PRN Pre/post blood products, medications, blood draw, and to maintain line patency  tamsulosin 0.4 milliGRAM(s) Oral at bedtime      LABS:	 	                            8.5    9.62  )-----------( 106      ( 25 May 2025 04:43 )             26.8     05-25    139  |  97  |  36.2[H]  ----------------------------<  85  4.5   |  22.0  |  5.77[H]    Ca    8.7      25 May 2025 04:43  Phos  5.8     05-25  Mg     1.9     05-25    TPro  6.1[L]  /  Alb  3.7  /  TBili  1.1  /  DBili  x   /  AST  10  /  ALT  <5  /  AlkPhos  71  05-25    PT/INR/PTT ( 23 May 2025 20:29 )                       :                       :      15.0         :       31.9                  .        .                   .              .           .       1.30        .                                       Lipid Profile:   HgA1c:   TSH:        DIAGNOSTIC TESTING:  [ ] Echocardiogram:   < from: TTE Limited W or WO Ultrasound Enhancing Agent (05.19.25 @ 17:14) >  CONCLUSIONS:      1. Septal motion is abnormal consistent with previous cardiac surgery. Left ventricular systolic function is moderately to severely decreased with an ejection fraction visually estimated at 35 to 40 %.   2. Moderate to severe left ventricular hypertrophy.   3. Normal right ventricular cavity size, with normal wall thickness, and normal right ventricular systolic function.   4. A bioprosthetic valve replacement valve replacement is present in the aortic position The prosthetic valve is well seated with normal function.   5. Bioprosthetic valve present. in the mitral position. Well seated prosthetic mitral valve with normal function. There is trace intravalvular mitral regurgitation.   6. Estimated pulmonary artery systolic pressure is 83 mmHg, consistent with severe pulmonary hypertension.   7. The inferior vena cava is dilated measuring 2.53 cm in diameter, (dilated >2.1cm) with abnormal inspiratory collapse (abnormal <50%) consistent with elevated right atrial pressure (~15, range 10-20mmHg).   8. No pericardial effusion seen.    < end of copied text >    [ ]  Catheterization:  < from: Cardiac Catheterization (05.22.25 @ 11:20) >  Diagnostic Conclusions:     Significant tortuosity of ascending aorta in setting of prior surgical  type A dissection repair, unable to advance catheters to aortic  root, unable to visualize native coronary arteries or grafts.  Mildly  elevated R and L sided filling pressures with PA (52/17 (35)),  and PCWP (17). Increased CO/CI (9.6/L/min / 5.65L/min/m2)   Recommendations:     Recommend obtaining coronary and graft CTA to clarify anatomy   Continued optimal medical therapy and GDMT for HFrEF     < end of copied text >       OTHER: 	    < from: CT Angio Cardiac w/ IV Cont (05.23.25 @ 09:15) >  CALCIUM SCORE  Agatston Equivalent Score    Segment                                Score  --------------------------------------------------  Left Main                                429  Left anterior Descending      1313  Circumflex                              1507  Right                                      1505  --------------------------------------------------  Total                                      4754 AU    CORONARY:  DOMINANCE: right  LEFT MAIN CORONARY ARTERY: Diffuse significant calcified plaque noted   with multiple areas of indeterminate stenoses.  ANTERIOR DESCENDING ARTERY: Diffuse significant calcified plaque noted   with multiple areas of indeterminate stenoses.  DIAGONAL BRANCHES: Not well visualized.  RAMUS INTERMEDIUS: Not visualized.  CIRCUMFLEX ARTERY: Diffuse significant calcified plaque noted with   multiple areas of indeterminate stenoses.  Obtuse marginal (OM): Not well visualized.  RIGHT CORONARY ARTERY: Diffuse significant calcified plaque noted with   multiple areas of indeterminate stenoses.  Posterior descending artery (PDA):Not well visualized.  Posterolateral branch (PL):  Not visualized.    Grafts: A patent vein graft from lower ascending aorta to right posterior   descending artery is visualized. A vein graft from ascending aorta to   second obtuse marginal was not visualized.    Non cardiac findings: Comparison is made to a prior chest CT from   5/19/2025. Small left pleural effusions are essentially unchanged since   the prior study, with intrafissural extension on the left. Partial lower   lobe atelectasis. Previously seen interlobular septal thickening has  improved. Sternotomy wires are midline and intact. Unchanged mediastinal   lymphadenopathy measuring up to 1.7 cm short axis, possibly reactive.   Trace perihepatic ascites. Please see separately dictated, same day   abdominal CTA runoff study for complete evaluation.    IMPRESSION:  1. Coronary artery calcium score:4754 AU  percentile ( Ref. Data   base is Leanne 2001*).  2. Significant multivessel CAD with multiple areas of indeterminate   stenosis due to low cotliahm-mu-vpvge ratio, poorvessel opacification   and significant blooming artifact from dense calcium deposits.  3. Unchanged small pleural effusions and improved interstitial pulmonary   edema since 5/19/2025.  4. Unchanged mediastinal lymphadenopathy, may be reactive in this   clinical setting.    < end of copied text >

## 2025-05-25 NOTE — PROGRESS NOTE ADULT - ASSESSMENT
65 y/o M with significant PMH ESRD s/p renal transplant on HD Tu/Th/Sat, type A aortic dissection repair (2010), ischemic bowel resection, CAD s/p CABG x 2 (SVG-OM1  and SVG-RPDA), bioprosthetic AVR/MVR (2020 Dr. Butler), Afib s/p ablation (2021), LAAO device (8/2024), MV endocarditis (2023), remote history of substance abuse and former smoker who presented with c/o shortness of breath. Course complicated by Rt groin hematoma urgently taken to OR for exploration/ evacuation with Hemorrhagic shock requiring  4 prbc, 2 ffp, 1 plt.

## 2025-05-25 NOTE — PROGRESS NOTE ADULT - PROBLEM SELECTOR PLAN 1
-New onset  -Euvolemic  -BNP 50K  -Fluid removal as per HD  -C/w BB, restart hydral 100mg BID (afterload reduction). If can tolerate will add isordil 5mg TID

## 2025-05-25 NOTE — PROGRESS NOTE ADULT - NS ATTEND AMEND GEN_ALL_CORE FT
-      65 y/o M with significant PMH ESRD s/p renal transplant on HD Tu/Th/Sat, type A aortic dissection repair (2010), ischemic bowel resection, CAD s/p CABG x 2 (SVG-OM1 and SVG-RPDA), bioprosthetic AVR/MVR (2020 Dr. Butler), Afib s/p ablation (2021), LAAO device (8/2024), MV endocarditis (2023), remote history of substance abuse and former smoker who presented with c/o shortness of breath. TTE this admission revealed newly reduced LV systolic function 35-40%. LHC was attempted but unable to pass through aortic root in view of prior type A dissection repair. RHC showed mildly elevated filling pressures and increased CO (of note, pt has AVF graft).    ACUTE SYSTOLIC HEART FAILURE  - LVEF 35-40% (newly reduced), LVIDd 4.6cm, normal RV size/fx  - Etiology: Unable to obtain LHC. S/p CTA this AM (results pending).  - VHD: prior AVR/MVR both well seated and functioning without significant regurgitation  - Device: a/p atrial leadless PPM 4/15/25 for junctional bradycardia. Device interrogation showed AP 28%.  - May consider CardioMEMS however patient would have to be compliant with transmitting daily readings.  - GDMT as below:  - HF to follow pt on Monday and resumption of GDMT per HF recommendations  - Fluid removal with TODAY and follow nephrology recs. for further HD sessions  - C/w BB, restart hydral 100mg BID (afterload reduction). If can tolerate will add isordil 5mg TID.    RIGHT GROIN LARGE HEMATOMA POST LHC VIA RFA/ Angioseal Hemorraghic Shock  - s/p LHC was attempted on 05/22/25 vi RFA access, access sealed with angio seal   - Post R/LHC pt developed hematoma in R groin  - s/p Right Femoral Artery Repair with Hemorrhagic shock 5/23   - Large expanding hematoma on R groin  - Open cut down and evacuation of ~1000cc of fresh blood  - S/p 4 prbc, 2 ffp, 1 plt.  - Vascular following.    CAD   - R and LHC was attempted on 05/22/25 but unable to pass through aortic root in view of prior type A dissection repair.   - RHC showed mildly elevated filling pressures and increased CO   - Pt had CCTA on 05/23 revealed LM: Diffuse significant calcified plaque noted with multiple areas of indeterminate stenoses. LAD: Diffuse significant calcified plaque noted with multiple areas of indeterminate stenoses. DIAGONAL BRANCHES: Not well visualized. RAMUS INTERMEDIUS: Not visualized. CIRCUMFLEX ARTERY: Diffuse significant calcified plaque noted with multiple areas of indeterminate stenoses. Obtuse marginal (OM): Not well visualized. RIGHT CORONARY ARTERY: Diffuse significant calcified plaque noted with multiple areas of indeterminate stenoses. Posterior descending artery (PDA):Not well visualized. Posterolateral branch (PL):  Not visualized.  -Medical management now in setting of s/p rt groin /thigh exploration/ hematoma evacuation and repair of CFA bleeding  -Will hold aspirin for now in setting of  rt groin /thigh vascular procedure yesterday  -Resume ASA, vascular gave OK  -C/w BB, statin (consider maximizing to 80mg). Resume imdur 60mg as tolerated.

## 2025-05-25 NOTE — PROGRESS NOTE ADULT - SUBJECTIVE AND OBJECTIVE BOX
[facility] Division of Hospital Medicine    SUBJECTIVE / OVERNIGHT EVENTS:    Pt seen and examined at bedside. Pt c/o right groin pain. Denies CP, SOB, N/V/D, abd pain, fever, chills. Rest of ROS (-).     MEDICATIONS  (STANDING):  atorvastatin 40 milliGRAM(s) Oral at bedtime  carvedilol 6.25 milliGRAM(s) Oral every 12 hours  chlorhexidine 2% Cloths 1 Application(s) Topical <User Schedule>  levETIRAcetam 500 milliGRAM(s) Oral two times a day  tamsulosin 0.4 milliGRAM(s) Oral at bedtime    MEDICATIONS  (PRN):  acetaminophen     Tablet .. 650 milliGRAM(s) Oral every 6 hours PRN Mild Pain (1 - 3)  hydrALAZINE Injectable 10 milliGRAM(s) IV Push every 3 hours PRN sbp>160  ondansetron Injectable 4 milliGRAM(s) IV Push every 8 hours PRN Nausea and/or Vomiting  oxyCODONE    IR 5 milliGRAM(s) Oral every 6 hours PRN Moderate Pain (4 - 6)  oxyCODONE    IR 10 milliGRAM(s) Oral every 6 hours PRN Severe Pain (7 - 10)  sodium chloride 0.9% lock flush 10 milliLiter(s) IV Push every 1 hour PRN Pre/post blood products, medications, blood draw, and to maintain line patency        I&O's Summary    24 May 2025 07:01  -  25 May 2025 07:00  --------------------------------------------------------  IN: 360 mL / OUT: 1335 mL / NET: -975 mL        PHYSICAL EXAM:  Vital Signs Last 24 Hrs  T(C): 37.1 (25 May 2025 10:55), Max: 37.1 (25 May 2025 10:55)  T(F): 98.8 (25 May 2025 10:55), Max: 98.8 (25 May 2025 10:55)  HR: 66 (25 May 2025 11:30) (66 - 91)  BP: 144/56 (25 May 2025 10:55) (119/54 - 150/61)  BP(mean): --  RR: 18 (25 May 2025 10:55) (17 - 18)  SpO2: 96% (25 May 2025 10:55) (93% - 98%)    Parameters below as of 25 May 2025 10:55  Patient On (Oxygen Delivery Method): room air          GENERAL: pt examined bedside, laying comfortably in bed in NAD  HEENT: NC/AT, moist oral mucosa, pale conjunctiva, sclera nonicteric  RESPIRATORY: Normal respiratory effort; CTA b/l, no wheezing, rhonchi, rales  CARDIOVASCULAR: irregularly irregular, normal S1 and S2, +murmurs  ABDOMEN: soft, NT/ND, normoactive bowel sounds, no rebound/guarding  EXTREMITIES: No cynaosis, no clubbing, no lower extremity edema, extremities warm w/ DP/PT palpable  NEUROLOGY: A+O to person, place, and time, no focal neurologic deficits appreciated   SKIN: pallor, Rt groin hematoma w/ dressing in place that has minimal amount of dry blood appreciated, DEDRICK drain in place at rt groin     LABS:                        8.5    9.62  )-----------( 106      ( 25 May 2025 04:43 )             26.8     05-25    139  |  97  |  36.2[H]  ----------------------------<  85  4.5   |  22.0  |  5.77[H]    Ca    8.7      25 May 2025 04:43  Phos  5.8     05-25  Mg     1.9     05-25    TPro  6.1[L]  /  Alb  3.7  /  TBili  1.1  /  DBili  x   /  AST  10  /  ALT  <5  /  AlkPhos  71  05-25    PT/INR - ( 23 May 2025 20:29 )   PT: 15.0 sec;   INR: 1.30 ratio         PTT - ( 23 May 2025 20:29 )  PTT:31.9 sec      Urinalysis Basic - ( 25 May 2025 04:43 )    Color: x / Appearance: x / SG: x / pH: x  Gluc: 85 mg/dL / Ketone: x  / Bili: x / Urobili: x   Blood: x / Protein: x / Nitrite: x   Leuk Esterase: x / RBC: x / WBC x   Sq Epi: x / Non Sq Epi: x / Bacteria: x        CAPILLARY BLOOD GLUCOSE            RADIOLOGY & ADDITIONAL TESTS:  Results Reviewed:   Imaging Personally Reviewed:  Electrocardiogram Personally Reviewed:

## 2025-05-25 NOTE — PROGRESS NOTE ADULT - ASSESSMENT
MICU Transfer/ Hospital course:   65 y/o M w/ PMH of ESRD s/p renal transplant on HD Tu/Th/Sat, type A aortic dissection s/p repair (2010), ischemic bowel resection, CAD s/p CABG x 2 (SVG-OM1and SVG-RPDA), bioprosthetic AVR/MVR (2020 w/ Dr. Butler), AF s/p ablation (2021) and LAAO device (8/2024), MV endocarditis (2023), seizure disorder, BPH, remote hx of substance abuse and former smoker initially presented to [facility] c/o SOB and was admitted 5/19/25 to medicine for new acute CHF exacerbation.  TTE on 5/19/25 was significant for newly reduced LV EF 35-40%.  Pt underwent RHC/LHC on 5/22/25 via RFA.  RHC showed mildly elevated filling pressures and increased CO.  During LHC was unable to pass through aortic root due to prior type A dissection repair.  Hospital course complicated by pt developing Rt groin hematoma on 05/23 noted in AM.  Cardio/cath lab called.  Initially manual pressure applied and fem stop placed but hematoma reoccurred/expanded refractory to fem stop.  Vascular was consulted and stat CTA RLE was ordered which showed a massive hematoma and pt was urgently taken to OR for exploration/ evacuation of  RLE hematoma,  Pt received 1 unit PRBC pre OR but during OR pt went into hemorrhagic shock and MTP was initiated and pt was subsequently given another 4u pRBC, 2u FFP and 1u plt in OR.  Pt s/p open cut-down and evacuation of 1L of fresh blood, femoral artery behind profunda vein was ligated and transected, 3mm arteriotomy w/ active bleeding at common femoral artery bifurcation repaired with suture, bleeding stopped and Rt groin DEDRICK drain placed.  Given pt received significant amount of blood products pre/intra-op decision was made to leave pt intubated for resp failure 2/2 vol overload from CHF but did not require pressor support.  Pt was transferred to MICU.  Pt was successfully extubated early morning of 5/24 and is currently on room air.  H/H w/ slight down trend this morning but otherwise is hemodynamically and cleared for down grade to medicine for continued monitoring.      Hemorrhagic shock 2/2 rt groin hematoma s/p LHC VIA RFA   - No pressors required but did undergo MTP intra-op for hemorrhagic shock  - Pt s/p total of 5/2/1 prbc/ffp/plt during hospital course    - 5/23 s/p open cut-down and evacuation of 1L of fresh blood, femoral artery behind profunda vein was ligated and transected, 3mm arteriotomy w/ active bleeding at common femoral artery bifurcation, repaired with suture and cessation of bleeding  - Slight down trend in H/H this morning but pt hemodynamically stable w/ RLE warm, palpable DP/PT noted and dressing in place that has minimal amount of dry blood appreciated   - Rt groin DEDRICK drain in place w/ small amount of blood at this time    - Maintained intubated s/p OR for concern for impending resp failure 2/2 vol overload given amount of blood products transfused in short time frame in setting of rEF   - pt successfully extubated AM of 5/24 and currently satting well ORA  - Monitor drain output   - Continue to trend H/h and transfuse for Hb<8   - If further transfusions required may need post transfusion diuresis or extra HD session   - Vascular surgery following        New acute decompensated HFrEF likely 2/2 CAD   - Clinically euvolemic at this time and satting well ORA  - TTE 5/19/25: LVEF 35-40%, mod to severe LVH, normal RV size/function, well seated normal functioning bioprosthetic AV, well seated normal functioning bioprosthetic MV w/ trace intravalvular regurgitation, PASP 83 mmHg (RAP 15 mmHg)   - s/p RCH 5/22 showed mildly elevated filling pressures and increased CO  - s/p LHC 5/22 was attempted via RFA access but unable to pass through aortic root in view of prior type A dissection repair   - s/p CTA 05/23 revealed LM, LAD, RCA and circumflex were all reported to have diffuse significant calcified plaque noted w/ multiple areas of indeterminate stenoses.  Diagonal branches, OM, PDA, posterior branch and ramus intermedius were not well visualized.    - c/w coreg and statin   - Continue holding Entresto, po Hydralazine, torsemide, Imdur given s/p hemorraghic shock and resume in a few days if BP tolerates and H/H stable   - Hold ASA for now in light of hemorrhagic shock but resume once cleared by vascular   - Going for regular HD session today but if further transfusions required may need post transfusion diuresis or extra session   - Monitor daily weights, strict I/O's and fluid restrict   - Monitor on telemetry  - Cardio and HF team following       Chronic HF  - s/p watchman procedure  - Not on AC  - c/w coreg       HTN / HLD   - Continue holding Entresto Hydralazine PO, torsemide, Imdur, clonidine given s/p hemorraghic shock and resume in a few days if BP tolerates and H/H stable   - Hold ASA for now in light of hemorrhagic shock but resume once cleared by vascular   - Pt was on amlodipine but now d/c'd in light of rEF      End stage renal disease  - c/w HD as per nephro   - Nephro following       Seizure disorder  - c/w keppra       BPH  - c/w flomax        VTE ppx: SCDs given pt s/p hemorrhagic shock.      MICU Transfer/ Hospital course:   65 y/o M w/ PMH of ESRD s/p renal transplant on HD Tu/Th/Sat, type A aortic dissection s/p repair (2010), ischemic bowel resection, CAD s/p CABG x 2 (SVG-OM1and SVG-RPDA), bioprosthetic AVR/MVR (2020 w/ Dr. Butler), AF s/p ablation (2021) and LAAO device (8/2024), MV endocarditis (2023), seizure disorder, BPH, remote hx of substance abuse and former smoker initially presented to [facility] c/o SOB and was admitted 5/19/25 to medicine for new acute CHF exacerbation.  TTE on 5/19/25 was significant for newly reduced LV EF 35-40%.  Pt underwent RHC/LHC on 5/22/25 via RFA.  RHC showed mildly elevated filling pressures and increased CO.  During LHC was unable to pass through aortic root due to prior type A dissection repair.  Hospital course complicated by pt developing Rt groin hematoma on 05/23 noted in AM.  Cardio/cath lab called.  Initially manual pressure applied and fem stop placed but hematoma reoccurred/expanded refractory to fem stop.  Vascular was consulted and stat CTA RLE was ordered which showed a massive hematoma and pt was urgently taken to OR for exploration/ evacuation of  RLE hematoma,  Pt received 1 unit PRBC pre OR but during OR pt went into hemorrhagic shock and MTP was initiated and pt was subsequently given another 4u pRBC, 2u FFP and 1u plt in OR.  Pt s/p open cut-down and evacuation of 1L of fresh blood, femoral artery behind profunda vein was ligated and transected, 3mm arteriotomy w/ active bleeding at common femoral artery bifurcation repaired with suture, bleeding stopped and Rt groin DEDRICK drain placed.  Given pt received significant amount of blood products pre/intra-op decision was made to leave pt intubated for resp failure 2/2 vol overload from CHF but did not require pressor support.  Pt was transferred to MICU.  Pt was successfully extubated early morning of 5/24 and is currently on room air.  H/H w/ slight down trend this morning but otherwise is hemodynamically and cleared for down grade to medicine for continued monitoring.      #Hemorrhagic shock 2/2 rt groin hematoma s/p LHC VIA RFA   - No pressors required but did undergo MTP intra-op for hemorrhagic shock  - Pt s/p total of 5/2/1 prbc/ffp/plt during hospital course    - 5/23 s/p open cut-down and evacuation of 1L of fresh blood, femoral artery behind profunda vein was ligated and transected, 3mm arteriotomy w/ active bleeding at common femoral artery bifurcation, repaired with suture and cessation of bleeding  - Pt hemodynamically stable w/ RLE warm, palpable DP/PT noted and dressing in place that has minimal amount of dry blood appreciated   - Rt groin DEDRICK drain in place w/ small amount of blood at this time    - Maintained intubated s/p OR for concern for impending resp failure 2/2 vol overload given amount of blood products transfused in short time frame in setting of rEF   - pt successfully extubated AM of 5/24 and currently satting well ORA  - Monitor drain output   - Continue to trend H/h and transfuse for Hb<8, currently stable today  - If further transfusions required may need post transfusion diuresis or extra HD session   - Vascular surgery consulted, recs noted    #New acute decompensated HFrEF likely 2/2 CAD   - Clinically euvolemic at this time and satting well ORA  - TTE 5/19/25: LVEF 35-40%, mod to severe LVH, normal RV size/function, well seated normal functioning bioprosthetic AV, well seated normal functioning bioprosthetic MV w/ trace intravalvular regurgitation, PASP 83 mmHg (RAP 15 mmHg)   - s/p RCH 5/22 showed mildly elevated filling pressures and increased CO  - s/p LHC 5/22 was attempted via RFA access but unable to pass through aortic root in view of prior type A dissection repair   - s/p CTA 05/23 revealed LM, LAD, RCA and circumflex were all reported to have diffuse significant calcified plaque noted w/ multiple areas of indeterminate stenoses.  Diagonal branches, OM, PDA, posterior branch and ramus intermedius were not well visualized.    - c/w coreg and statin   - Continue holding Entresto, po Hydralazine, torsemide, Imdur given s/p hemorraghic shock and resume in a few days if BP tolerates and H/H stable   - Hold ASA for now in light of hemorrhagic shock but resume once cleared by vascular   - Going for regular HD session today but if further transfusions required may need post transfusion diuresis or extra session   - Monitor daily weights, strict I/O's and fluid restrict   - Monitor on telemetry  - Cardio and HF team following     #Chronic HF  - s/p watchman procedure  - Not on AC  - c/w coreg     #HTN / HLD   - Continue holding Entresto Hydralazine PO, torsemide, Imdur, clonidine given s/p hemorraghic shock and resume in a few days if BP tolerates and H/H stable   - Hold ASA for now in light of hemorrhagic shock but resume once cleared by vascular   - Pt was on amlodipine but now d/c'd in light of rEF    #End stage renal disease  - c/w HD as per nephro   - Nephro following     #Seizure disorder  - c/w keppra     #BPH  - c/w flomax    VTE ppx: SCDs given pt s/p hemorrhagic shock.     Dispo: still acute

## 2025-05-26 LAB
ANION GAP SERPL CALC-SCNC: 15 MMOL/L — SIGNIFICANT CHANGE UP (ref 5–17)
BUN SERPL-MCNC: 48.5 MG/DL — HIGH (ref 8–20)
CALCIUM SERPL-MCNC: 8.4 MG/DL — SIGNIFICANT CHANGE UP (ref 8.4–10.5)
CHLORIDE SERPL-SCNC: 95 MMOL/L — LOW (ref 96–108)
CO2 SERPL-SCNC: 25 MMOL/L — SIGNIFICANT CHANGE UP (ref 22–29)
CREAT SERPL-MCNC: 7.86 MG/DL — HIGH (ref 0.5–1.3)
EGFR: 7 ML/MIN/1.73M2 — LOW
EGFR: 7 ML/MIN/1.73M2 — LOW
FERRITIN SERPL-MCNC: 806 NG/ML — HIGH (ref 30–400)
GLUCOSE SERPL-MCNC: 93 MG/DL — SIGNIFICANT CHANGE UP (ref 70–99)
HCT VFR BLD CALC: 23.8 % — LOW (ref 39–50)
HGB BLD-MCNC: 7.7 G/DL — LOW (ref 13–17)
IMMATURE PLATELET FRACTION #: 3 K/UL — LOW (ref 3.9–12.5)
IMMATURE PLATELET FRACTION %: 3.5 % — SIGNIFICANT CHANGE UP (ref 1.6–7.1)
MCHC RBC-ENTMCNC: 31.4 PG — SIGNIFICANT CHANGE UP (ref 27–34)
MCHC RBC-ENTMCNC: 32.4 G/DL — SIGNIFICANT CHANGE UP (ref 32–36)
MCV RBC AUTO: 97.1 FL — SIGNIFICANT CHANGE UP (ref 80–100)
NRBC # BLD AUTO: 0 K/UL — SIGNIFICANT CHANGE UP (ref 0–0)
NRBC # FLD: 0 K/UL — SIGNIFICANT CHANGE UP (ref 0–0)
NRBC BLD AUTO-RTO: 0 /100 WBCS — SIGNIFICANT CHANGE UP (ref 0–0)
PLATELET # BLD AUTO: 87 K/UL — LOW (ref 150–400)
PMV BLD: 11.4 FL — SIGNIFICANT CHANGE UP (ref 7–13)
POTASSIUM SERPL-MCNC: 4.4 MMOL/L — SIGNIFICANT CHANGE UP (ref 3.5–5.3)
POTASSIUM SERPL-SCNC: 4.4 MMOL/L — SIGNIFICANT CHANGE UP (ref 3.5–5.3)
RBC # BLD: 2.45 M/UL — LOW (ref 4.2–5.8)
RBC # FLD: 17 % — HIGH (ref 10.3–14.5)
SODIUM SERPL-SCNC: 135 MMOL/L — SIGNIFICANT CHANGE UP (ref 135–145)
WBC # BLD: 7.75 K/UL — SIGNIFICANT CHANGE UP (ref 3.8–10.5)
WBC # FLD AUTO: 7.75 K/UL — SIGNIFICANT CHANGE UP (ref 3.8–10.5)

## 2025-05-26 PROCEDURE — 99232 SBSQ HOSP IP/OBS MODERATE 35: CPT

## 2025-05-26 PROCEDURE — 99233 SBSQ HOSP IP/OBS HIGH 50: CPT

## 2025-05-26 RX ORDER — AMLODIPINE BESYLATE 10 MG/1
5 TABLET ORAL DAILY
Refills: 0 | Status: DISCONTINUED | OUTPATIENT
Start: 2025-05-26 | End: 2025-05-26

## 2025-05-26 RX ORDER — HEPARIN SODIUM 1000 [USP'U]/ML
5000 INJECTION INTRAVENOUS; SUBCUTANEOUS EVERY 12 HOURS
Refills: 0 | Status: DISCONTINUED | OUTPATIENT
Start: 2025-05-26 | End: 2025-05-28

## 2025-05-26 RX ORDER — ASPIRIN 325 MG
81 TABLET ORAL DAILY
Refills: 0 | Status: DISCONTINUED | OUTPATIENT
Start: 2025-05-26 | End: 2025-05-29

## 2025-05-26 RX ORDER — FOLIC ACID 1 MG/1
1 TABLET ORAL DAILY
Refills: 0 | Status: DISCONTINUED | OUTPATIENT
Start: 2025-05-26 | End: 2025-06-03

## 2025-05-26 RX ORDER — ISOSORBIDE MONONITRATE 60 MG/1
60 TABLET, EXTENDED RELEASE ORAL DAILY
Refills: 0 | Status: DISCONTINUED | OUTPATIENT
Start: 2025-05-26 | End: 2025-05-30

## 2025-05-26 RX ORDER — EPOETIN ALFA 10000 [IU]/ML
10000 SOLUTION INTRAVENOUS; SUBCUTANEOUS
Refills: 0 | Status: DISCONTINUED | OUTPATIENT
Start: 2025-05-27 | End: 2025-06-03

## 2025-05-26 RX ORDER — EPOETIN ALFA 10000 [IU]/ML
10000 SOLUTION INTRAVENOUS; SUBCUTANEOUS
Refills: 0 | Status: DISCONTINUED | OUTPATIENT
Start: 2025-05-26 | End: 2025-05-26

## 2025-05-26 RX ADMIN — OXYCODONE HYDROCHLORIDE 10 MILLIGRAM(S): 30 TABLET ORAL at 20:57

## 2025-05-26 RX ADMIN — OXYCODONE HYDROCHLORIDE 10 MILLIGRAM(S): 30 TABLET ORAL at 08:35

## 2025-05-26 RX ADMIN — Medication 667 MILLIGRAM(S): at 17:16

## 2025-05-26 RX ADMIN — ATORVASTATIN CALCIUM 40 MILLIGRAM(S): 80 TABLET, FILM COATED ORAL at 20:57

## 2025-05-26 RX ADMIN — OXYCODONE HYDROCHLORIDE 10 MILLIGRAM(S): 30 TABLET ORAL at 03:17

## 2025-05-26 RX ADMIN — TAMSULOSIN HYDROCHLORIDE 0.4 MILLIGRAM(S): 0.4 CAPSULE ORAL at 20:58

## 2025-05-26 RX ADMIN — CARVEDILOL 6.25 MILLIGRAM(S): 3.12 TABLET, FILM COATED ORAL at 20:58

## 2025-05-26 RX ADMIN — OXYCODONE HYDROCHLORIDE 10 MILLIGRAM(S): 30 TABLET ORAL at 15:15

## 2025-05-26 RX ADMIN — Medication 667 MILLIGRAM(S): at 11:33

## 2025-05-26 RX ADMIN — OXYCODONE HYDROCHLORIDE 10 MILLIGRAM(S): 30 TABLET ORAL at 09:15

## 2025-05-26 RX ADMIN — Medication 81 MILLIGRAM(S): at 11:27

## 2025-05-26 RX ADMIN — Medication 650 MILLIGRAM(S): at 05:31

## 2025-05-26 RX ADMIN — LEVETIRACETAM 500 MILLIGRAM(S): 10 INJECTION, SOLUTION INTRAVENOUS at 08:07

## 2025-05-26 RX ADMIN — OXYCODONE HYDROCHLORIDE 10 MILLIGRAM(S): 30 TABLET ORAL at 21:57

## 2025-05-26 RX ADMIN — Medication 667 MILLIGRAM(S): at 08:08

## 2025-05-26 RX ADMIN — FOLIC ACID 1 MILLIGRAM(S): 1 TABLET ORAL at 11:27

## 2025-05-26 RX ADMIN — HEPARIN SODIUM 5000 UNIT(S): 1000 INJECTION INTRAVENOUS; SUBCUTANEOUS at 17:16

## 2025-05-26 RX ADMIN — CARVEDILOL 6.25 MILLIGRAM(S): 3.12 TABLET, FILM COATED ORAL at 08:08

## 2025-05-26 RX ADMIN — OXYCODONE HYDROCHLORIDE 10 MILLIGRAM(S): 30 TABLET ORAL at 14:32

## 2025-05-26 RX ADMIN — Medication 1 APPLICATION(S): at 05:26

## 2025-05-26 RX ADMIN — ISOSORBIDE MONONITRATE 60 MILLIGRAM(S): 60 TABLET, EXTENDED RELEASE ORAL at 11:28

## 2025-05-26 RX ADMIN — Medication 40 MILLIGRAM(S): at 11:27

## 2025-05-26 RX ADMIN — LEVETIRACETAM 500 MILLIGRAM(S): 10 INJECTION, SOLUTION INTRAVENOUS at 20:58

## 2025-05-26 RX ADMIN — OXYCODONE HYDROCHLORIDE 10 MILLIGRAM(S): 30 TABLET ORAL at 02:17

## 2025-05-26 NOTE — PROGRESS NOTE ADULT - PROBLEM SELECTOR PLAN 1
Medical management   Left system could not be cannulated  s/p groin hematoma requiring exploration  hgb is stable  back on ASA

## 2025-05-26 NOTE — PROGRESS NOTE ADULT - SUBJECTIVE AND OBJECTIVE BOX
NEPHROLOGY INTERVAL HPI/OVERNIGHT EVENTS:    Feels better   Drain in R leg   No SOB or CP     MEDICATIONS  (STANDING):  atorvastatin 40 milliGRAM(s) Oral at bedtime  carvedilol 6.25 milliGRAM(s) Oral every 12 hours  chlorhexidine 2% Cloths 1 Application(s) Topical <User Schedule>  levETIRAcetam 500 milliGRAM(s) Oral two times a day  tamsulosin 0.4 milliGRAM(s) Oral at bedtime    MEDICATIONS  (PRN):  acetaminophen     Tablet .. 650 milliGRAM(s) Oral every 6 hours PRN Mild Pain (1 - 3)  hydrALAZINE Injectable 10 milliGRAM(s) IV Push every 3 hours PRN sbp>160  ondansetron Injectable 4 milliGRAM(s) IV Push every 8 hours PRN Nausea and/or Vomiting  oxyCODONE    IR 5 milliGRAM(s) Oral every 6 hours PRN Moderate Pain (4 - 6)  oxyCODONE    IR 10 milliGRAM(s) Oral every 6 hours PRN Severe Pain (7 - 10)  sodium chloride 0.9% lock flush 10 milliLiter(s) IV Push every 1 hour PRN Pre/post blood products, medications, blood draw, and to maintain line patency      Allergies    penicillin (Other)    Intolerances      Vital Signs Last 24 Hrs  T(C): 36.7 (26 May 2025 04:45), Max: 37.1 (25 May 2025 10:55)  T(F): 98 (26 May 2025 04:45), Max: 98.8 (25 May 2025 10:55)  HR: 65 (26 May 2025 04:45) (65 - 72)  BP: 154/66 (26 May 2025 04:45) (144/56 - 186/78)  BP(mean): 98 (26 May 2025 02:15) (98 - 114)  RR: 18 (26 May 2025 04:45) (18 - 18)  SpO2: 96% (26 May 2025 04:45) (96% - 98%)    Parameters below as of 26 May 2025 02:15  Patient On (Oxygen Delivery Method): room air      Daily     Daily Weight in k (26 May 2025 04:45)  I&O's Detail    24 May 2025 07:01  -  25 May 2025 07:00  --------------------------------------------------------  IN:    Oral Fluid: 360 mL  Total IN: 360 mL    OUT:    Bulb (mL): 190 mL    Indwelling Catheter - Urethral (mL): 145 mL    Other (mL): 1000 mL  Total OUT: 1335 mL    Total NET: -975 mL      25 May 2025 07:  -  26 May 2025 06:19  --------------------------------------------------------  IN:  Total IN: 0 mL    OUT:    Bulb (mL): 30 mL  Total OUT: 30 mL    Total NET: -30 mL        I&O's Summary    24 May 2025 07:  -  25 May 2025 07:00  --------------------------------------------------------  IN: 360 mL / OUT: 1335 mL / NET: -975 mL    25 May 2025 07:01  -  26 May 2025 06:19  --------------------------------------------------------  IN: 0 mL / OUT: 30 mL / NET: -30 mL        PHYSICAL EXAM:    PHYSICAL EXAM:  GENERAL: Appears acutely and chronically ill  HEENT: Moist MMs  NECK: Supple, No JVD  NERVOUS SYSTEM:  A/O x3  CHEST: Clear, diminished BS  HEART:  RRR, no rub  ABDOMEN: Soft, NT/ND BS+  EXTREMITIES: Tr dependent edema, PP drain in R leg , L arm access w/ thrill  LABS:                        7.7    7.75  )-----------( x        ( 26 May 2025 04:32 )             23.8     05-26    135  |  95[L]  |  48.5[H]  ----------------------------<  93  4.4   |  25.0  |  7.86[H]    Ca    8.4      26 May 2025 04:32  Phos  5.8     05-  Mg     1.9         TPro  6.1[L]  /  Alb  3.7  /  TBili  1.1  /  DBili  x   /  AST  10  /  ALT  <5  /  AlkPhos  71  25      Urinalysis Basic - ( 26 May 2025 04:32 )    Color: x / Appearance: x / SG: x / pH: x  Gluc: 93 mg/dL / Ketone: x  / Bili: x / Urobili: x   Blood: x / Protein: x / Nitrite: x   Leuk Esterase: x / RBC: x / WBC x   Sq Epi: x / Non Sq Epi: x / Bacteria: x              RADIOLOGY & ADDITIONAL TESTS:

## 2025-05-26 NOTE — PROGRESS NOTE ADULT - ASSESSMENT
65 yo M with PMH with hx of ESRD on HD, A. fib s/p ablation, LAAO device, CAD s/p CABG x2, bioprosthetic AVR/MR repair, MV endocarditis 2023, type A aortic dissection s/p repair w/ ischemic bowel s/p resection, hx substance abuse, presenting with shortness of breath.  - TTE 5/19/25: LVEF 35-40%, mod to severe LVH, normal RV size/function, well seated normal functioning bioprosthetic AV, well seated normal functioning bioprosthetic MV w/ trace intravalvular regurgitation, PASP 83 mmHg (RAP 15 mmHg)   - s/p RCH 5/22 showed mildly elevated filling pressures and increased CO  - s/p LHC 5/22 was attempted via RFA access but unable to pass through aortic root in view of prior type A dissection repair   - s/p CTA 05/23 revealed LM, LAD, RCA and circumflex were all reported to have diffuse significant calcified plaque noted w/ multiple areas of indeterminate stenoses.  Diagonal branches, OM, PDA, posterior branch and ramus intermedius were not well visualized.     ESRD: CHF r/o cardiac event  HTN  - HD  on a TTS schedule  - Eventual resumption of Entresto  -  s/p RHC/ LHC    Anemia: +CKD; Tsat 20%  - cont REINALDO at HD  - may need PRBCs  - Repeat iron stores     RO: serum phos ok  - low phos diet  - Add Phoslo 63 yo M with PMH with hx of ESRD on HD, A. fib s/p ablation, LAAO device, CAD s/p CABG x2, bioprosthetic AVR/MR repair, MV endocarditis 2023, type A aortic dissection s/p repair w/ ischemic bowel s/p resection, hx substance abuse, presenting with shortness of breath.  - TTE 5/19/25: LVEF 35-40%, mod to severe LVH, normal RV size/function, well seated normal functioning bioprosthetic AV, well seated normal functioning bioprosthetic MV w/ trace intravalvular regurgitation, PASP 83 mmHg (RAP 15 mmHg)   - s/p RCH 5/22 showed mildly elevated filling pressures and increased CO  - s/p LHC 5/22 was attempted via RFA access but unable to pass through aortic root in view of prior type A dissection repair   - s/p CTA 05/23 revealed LM, LAD, RCA and circumflex were all reported to have diffuse significant calcified plaque noted w/ multiple areas of indeterminate stenoses.  Diagonal branches, OM, PDA, posterior branch and ramus intermedius were not well visualized.     ESRD: CHF r/o cardiac event  HTN  - HD  on a TTS schedule  - Eventual resumption of Entresto  -  s/p RHC/ LHC    Anemia: +CKD; Tsat 20%  - cont REINALDO at HD  - Add folate  - may need PRBCs  - Repeat iron stores     RO: serum phos ok  - low phos diet  - Add Phoslo

## 2025-05-26 NOTE — PROGRESS NOTE ADULT - ASSESSMENT
65 y/o M w/ PMH of ESRD s/p renal transplant on HD Tu/Th/Sat, type A aortic dissection s/p repair (2010), ischemic bowel resection, CAD s/p CABG x 2 (SVG-OM1and SVG-RPDA), bioprosthetic AVR/MVR (2020 w/ Dr. Butler), AF s/p ablation (2021) and LAAO device (8/2024), MV endocarditis (2023), seizure disorder, BPH, remote hx of substance abuse and former smoker initially presented to [facility] c/o SOB and was admitted 5/19/25 to medicine for new acute CHF exacerbation.  TTE on 5/19/25 was significant for newly reduced LV EF 35-40%.  Pt underwent RHC/LHC on 5/22/25 via RFA.  RHC showed mildly elevated filling pressures and increased CO.  During LHC was unable to pass through aortic root due to prior type A dissection repair.  Hospital course complicated by pt developing Rt groin hematoma on 05/23 noted in AM.  Cardio/cath lab called.  Initially manual pressure applied and fem stop placed but hematoma reoccurred/expanded refractory to fem stop.  Vascular was consulted and stat CTA RLE was ordered which showed a massive hematoma and pt was urgently taken to OR for exploration/ evacuation of  RLE hematoma,  Pt received 1 unit PRBC pre OR but during OR pt went into hemorrhagic shock and MTP was initiated and pt was subsequently given another 4u pRBC, 2u FFP and 1u plt in OR.  Pt s/p open cut-down and evacuation of 1L of fresh blood, femoral artery behind profunda vein was ligated and transected, 3mm arteriotomy w/ active bleeding at common femoral artery bifurcation repaired with suture, bleeding stopped and Rt groin DEDRICK drain placed.  Given pt received significant amount of blood products pre/intra-op decision was made to leave pt intubated for resp failure 2/2 vol overload from CHF but did not require pressor support.  Pt was transferred to MICU.  Pt was successfully extubated early morning of 5/24 and is currently on room air.        #Hemorrhagic shock 2/2 rt groin hematoma s/p LHC VIA RFA   - No pressors required but did undergo MTP intra-op for hemorrhagic shock  - Pt s/p total of 5/2/1 prbc/ffp/plt during hospital course    - 5/23 s/p open cut-down and evacuation of 1L of fresh blood, femoral artery behind profunda vein was ligated and transected, 3mm arteriotomy w/ active bleeding at common femoral artery bifurcation, repaired with suture and cessation of bleeding  - Pt hemodynamically stable w/ RLE warm, palpable DP/PT noted and dressing in place that has minimal amount of dry blood appreciated   - Rt groin DEDRICK drain in place w/ small amount of blood at this time    - Maintained intubated s/p OR for concern for impending resp failure 2/2 vol overload given amount of blood products transfused in short time frame in setting of rEF   - pt successfully extubated AM of 5/24 and currently satting well ra  - Continue to trend H/h keep type and screen active  - Vascular surgery consulted, recs noted, start asa and hep sub q as per vascular    #New acute decompensated HFrEF likely 2/2 CAD   - Clinically euvolemic at this time and satting well ORA  - TTE 5/19/25: LVEF 35-40%, mod to severe LVH, normal RV size/function, well seated normal functioning bioprosthetic AV, well seated normal functioning bioprosthetic MV w/ trace intravalvular regurgitation, PASP 83 mmHg (RAP 15 mmHg)   - s/p RCH 5/22 showed mildly elevated filling pressures and increased CO  - s/p LHC 5/22 was attempted via RFA access but unable to pass through aortic root in view of prior type A dissection repair   - s/p CTA 05/23 revealed LM, LAD, RCA and circumflex were all reported to have diffuse significant calcified plaque noted w/ multiple areas of indeterminate stenoses.  Diagonal branches, OM, PDA, posterior branch and ramus intermedius were not well visualized.    - c/w coreg and statin   - Continue holding Entresto, po Hydralazine, torsemide,   - Imdur resumed   - hd as per renal   - Cardio and HF team following     #Chronic HF  - s/p watchman procedure  - Not on AC  - c/w coreg     #HTN / HLD   - Continue holding Entresto Hydralazine PO, torsemide,   - Imdur resumed    #End stage renal disease  - c/w HD as per nephro   - Nephro following     #Seizure disorder  - c/w keppra     #BPH  - c/w flomax    VTE ppx:  hep sub q  Dispo: still acute    needs 2-3 days to trend hgb

## 2025-05-26 NOTE — PROGRESS NOTE ADULT - SUBJECTIVE AND OBJECTIVE BOX
JONATHAN GIBSON    [id number]    64y      Male    INTERVAL HPI/OVERNIGHT EVENTS:  patient being seen for anemia and right groin hematoma.  patient denies any complaints and wants to get out of bed    REVIEW OF SYSTEMS:    CONSTITUTIONAL: No fever, weight loss, or fatigue  RESPIRATORY: No cough, wheezing, hemoptysis; No shortness of breath  CARDIOVASCULAR: No chest pain, palpitations  GASTROINTESTINAL: No abdominal or epigastric pain. No nausea, vomiting  NEUROLOGICAL: No headaches, memory loss, loss of strength.  MISCELLANEOUS:      Vital Signs Last 24 Hrs  T(C): 36.7 (26 May 2025 04:45), Max: 37.1 (25 May 2025 10:55)  T(F): 98 (26 May 2025 04:45), Max: 98.8 (25 May 2025 10:55)  HR: 65 (26 May 2025 04:45) (65 - 72)  BP: 154/66 (26 May 2025 04:45) (144/56 - 186/78)  BP(mean): 98 (26 May 2025 02:15) (98 - 114)  RR: 18 (26 May 2025 04:45) (18 - 18)  SpO2: 96% (26 May 2025 04:45) (96% - 98%)    Parameters below as of 26 May 2025 02:15  Patient On (Oxygen Delivery Method): room air        PHYSICAL EXAM:    GENERAL: pt examined bedside, laying comfortably in bed in NAD  HEENT: NC/AT, moist oral mucosa, pale conjunctiva, sclera nonicteric  RESPIRATORY: Normal respiratory effort; CTA b/l, no wheezing, rhonchi, rales  CARDIOVASCULAR: irregularly irregular, normal S1 and S2, +murmurs  ABDOMEN: soft, NT/ND, normoactive bowel sounds, no rebound/guarding  EXTREMITIES: No cynaosis, no clubbing, no lower extremity edema, extremities warm w/ DP/PT palpable  NEUROLOGY: A+O to person, place, and time, no focal neurologic deficits appreciated   SKIN: pallor, Rt groin hematoma w/ dressing in place that has minimal amount of dry blood appreciated,      LABS:                        7.7    7.75  )-----------( 87       ( 26 May 2025 04:32 )             23.8     05-26    135  |  95[L]  |  48.5[H]  ----------------------------<  93  4.4   |  25.0  |  7.86[H]    Ca    8.4      26 May 2025 04:32  Phos  5.8     05-25  Mg     1.9     05-25    TPro  6.1[L]  /  Alb  3.7  /  TBili  1.1  /  DBili  x   /  AST  10  /  ALT  <5  /  AlkPhos  71  05-25      Urinalysis Basic - ( 26 May 2025 04:32 )    Color: x / Appearance: x / SG: x / pH: x  Gluc: 93 mg/dL / Ketone: x  / Bili: x / Urobili: x   Blood: x / Protein: x / Nitrite: x   Leuk Esterase: x / RBC: x / WBC x   Sq Epi: x / Non Sq Epi: x / Bacteria: x          MEDICATIONS  (STANDING):  aspirin  chewable 81 milliGRAM(s) Oral daily  atorvastatin 40 milliGRAM(s) Oral at bedtime  calcium acetate 667 milliGRAM(s) Oral three times a day with meals  carvedilol 6.25 milliGRAM(s) Oral every 12 hours  chlorhexidine 2% Cloths 1 Application(s) Topical <User Schedule>  folic acid 1 milliGRAM(s) Oral daily  heparin   Injectable 5000 Unit(s) SubCutaneous every 12 hours  isosorbide   mononitrate ER Tablet (IMDUR) 60 milliGRAM(s) Oral daily  levETIRAcetam 500 milliGRAM(s) Oral two times a day  pantoprazole  Injectable 40 milliGRAM(s) IV Push daily  tamsulosin 0.4 milliGRAM(s) Oral at bedtime    MEDICATIONS  (PRN):  acetaminophen     Tablet .. 650 milliGRAM(s) Oral every 6 hours PRN Mild Pain (1 - 3)  hydrALAZINE Injectable 10 milliGRAM(s) IV Push every 3 hours PRN sbp>160  ondansetron Injectable 4 milliGRAM(s) IV Push every 8 hours PRN Nausea and/or Vomiting  oxyCODONE    IR 5 milliGRAM(s) Oral every 6 hours PRN Moderate Pain (4 - 6)  oxyCODONE    IR 10 milliGRAM(s) Oral every 6 hours PRN Severe Pain (7 - 10)  sodium chloride 0.9% lock flush 10 milliLiter(s) IV Push every 1 hour PRN Pre/post blood products, medications, blood draw, and to maintain line patency      RADIOLOGY & ADDITIONAL TESTS:

## 2025-05-26 NOTE — PROGRESS NOTE ADULT - NS ATTEND AMEND GEN_ALL_CORE FT
-      63 y/o M with significant PMH ESRD s/p renal transplant on HD Tu/Th/Sat, type A aortic dissection repair (2010), ischemic bowel resection, CAD s/p CABG x 2 (SVG-OM1 and SVG-RPDA), bioprosthetic AVR/MVR (2020 Dr. Butler), Afib s/p ablation (2021), LAAO device (8/2024), MV endocarditis (2023), remote history of substance abuse and former smoker who presented with c/o shortness of breath. TTE this admission revealed newly reduced LV systolic function 35-40%. LHC was attempted but unable to pass through aortic root in view of prior type A dissection repair. RHC showed mildly elevated filling pressures and increased CO (of note, pt has AVF graft).    ACUTE SYSTOLIC HEART FAILURE  - LVEF 35-40% (newly reduced), LVIDd 4.6cm, normal RV size/fx  - Etiology: Unable to obtain LHC.   - VHD: prior AVR/MVR both well seated and functioning without significant regurgitation  - Device: a/p atrial leadless PPM 4/15/25 for junctional bradycardia. Device interrogation showed AP 28%.  - May consider CardioMEMS however patient would have to be compliant with transmitting daily readings.  - GDMT as below:  - Fluid removal with HD sessions    RIGHT GROIN LARGE HEMATOMA POST LHC VIA RFA/ Angioseal Hemorraghic Shock  - s/p LHC was attempted on 05/22/25 vi RFA access, access sealed with angio seal   - Post R/LHC pt developed hematoma in R groin  - s/p Right Femoral Artery Repair with Hemorrhagic shock 5/23   - Large expanding hematoma on R groin  - Open cut down and evacuation of ~1000cc of fresh blood  - S/p 4 prbc, 2 ffp, 1 plt.  - Vascular following.    CAD   - R and LHC was attempted on 05/22/25 but unable to pass through aortic root in view of prior type A dissection repair.   - RHC showed mildly elevated filling pressures and increased CO   - Pt had CCTA on 05/23 revealed LM: Diffuse significant calcified plaque noted with multiple areas of indeterminate stenoses. LAD: Diffuse significant calcified plaque noted with multiple areas of indeterminate stenoses. DIAGONAL BRANCHES: Not well visualized. RAMUS INTERMEDIUS: Not visualized. CIRCUMFLEX ARTERY: Diffuse significant calcified plaque noted with multiple areas of indeterminate stenoses. Obtuse marginal (OM): Not well visualized. RIGHT CORONARY ARTERY: Diffuse significant calcified plaque noted with multiple areas of indeterminate stenoses. Posterior descending artery (PDA):Not well visualized. Posterolateral branch (PL):  Not visualized.  - Medical management now in setting of s/p rt groin /thigh exploration/ hematoma evacuation and repair of CFA bleeding  - Cont GDMT

## 2025-05-26 NOTE — PROGRESS NOTE ADULT - ASSESSMENT
65 y/o M with significant PMH ESRD s/p renal transplant on HD Tu/Th/Sat, type A aortic dissection repair (2010), ischemic bowel resection, CAD s/p CABG x 2 (SVG-OM1  and SVG-RPDA), bioprosthetic AVR/MVR (2020 Dr. Butler), Afib s/p ablation (2021), LAAO device (8/2024), MV endocarditis (2023), remote history of substance abuse and former smoker who presented with c/o shortness of breath. Course complicated by Rt groin hematoma urgently taken to OR for exploration/ evacuation with Hemorrhagic shock requiring  4 prbc, 2 ffp, 1 plt. 63 y/o M with significant PMH ESRD s/p renal transplant on HD Tu/Th/Sat, type A aortic dissection repair (2010), ischemic bowel resection, CAD s/p CABG x 2 (SVG-OM1and SVG-RPDA), bioprosthetic AVR/MVR (2020 Dr. Butler), Afib s/p ablation (2021),PPM  LAAO device (8/2024), MV endocarditis (2023), remote history of substance abuse and former smoker who presented with c/o shortness of breath. Course complicated by Rt groin hematoma urgently taken to OR for exploration/ evacuation with Hemorrhagic shock requiring  4 prbc, 2 ffp, 1 plt.

## 2025-05-26 NOTE — PROGRESS NOTE ADULT - SUBJECTIVE AND OBJECTIVE BOX
[facility]                                                         CARDIOLOGY AT [facility]                                                                  [address] [address]                                                         Telephone: [phone]. Fax:[phone]                                                                             PROGRESS NOTE    Reason for follow up: CAD/HFrEF  Update:  No chest pain  Back on asa hgb is stable    Review of symptoms:   Cardiac:  No chest pain. No dyspnea. No palpitations.  Respiratory: no cough. No dyspnea  Gastrointestinal: No diarrhea. No abdominal pain. No bleeding.   Neuro: No focal neuro complaints.      Vitals:  T(C): 36.7 (05-26-25 @ 04:45), Max: 37.1 (05-25-25 @ 10:55)  HR: 65 (05-26-25 @ 04:45) (65 - 72)  BP: 154/66 (05-26-25 @ 04:45) (144/56 - 186/78)  RR: 18 (05-26-25 @ 04:45) (18 - 18)  SpO2: 96% (05-26-25 @ 04:45) (96% - 98%)  Wt(kg): --  I&O's Summary    25 May 2025 07:01  -  26 May 2025 07:00  --------------------------------------------------------  IN: 0 mL / OUT: 30 mL / NET: -30 mL      Weight (kg): 63.5 (05-23 @ 04:24)      PHYSICAL EXAM:  Appearance: Thin pale appearing  HEENT:  Atraumatic. Normocephalic.  Normal oral mucosa  Neurologic: A & O x 3, no gross focal deficits.  Cardiovascular: RRR S1 S2, 2/6 aisha lsb  Respiratory: Lungs clear to auscultation, unlabored   Gastrointestinal:  Soft, Non-tender, + BS  Lower Extremities: No edema  left av fistual  Psychiatry: Patient is calm. No agitation.   Skin: warm and dry.      CURRENT MEDICATIONS:  MEDICATIONS  (STANDING):  aspirin  chewable 81 milliGRAM(s) Oral daily  atorvastatin 40 milliGRAM(s) Oral at bedtime  calcium acetate 667 milliGRAM(s) Oral three times a day with meals  carvedilol 6.25 milliGRAM(s) Oral every 12 hours  chlorhexidine 2% Cloths 1 Application(s) Topical <User Schedule>  folic acid 1 milliGRAM(s) Oral daily  heparin   Injectable 5000 Unit(s) SubCutaneous every 12 hours  isosorbide   mononitrate ER Tablet (IMDUR) 60 milliGRAM(s) Oral daily  levETIRAcetam 500 milliGRAM(s) Oral two times a day  pantoprazole  Injectable 40 milliGRAM(s) IV Push daily  tamsulosin 0.4 milliGRAM(s) Oral at bedtime    LABS:	 	                        7.7    7.75  )-----------( 87       ( 26 May 2025 04:32 )             23.8     05-26    135  |  95[L]  |  48.5[H]  ----------------------------<  93  4.4   |  25.0  |  7.86[H]    Ca    8.4      26 May 2025 04:32  Phos  5.8     05-25  Mg     1.9     05-25    TPro  6.1[L]  /  Alb  3.7  /  TBili  1.1  /  DBili  x   /  AST  10  /  ALT  <5  /  AlkPhos  71  05-25    TELEMETRY: NSR  DIAGNOSTIC TESTING:  [ ] Echocardiogram: < from: TTE Limited W or WO Ultrasound Enhancing Agent (05.19.25 @ 17:14) >   1. Septal motion is abnormal consistent with previous cardiac surgery. Left ventricular systolic function is moderately to severely decreased with an ejection fraction visually estimated at 35 to 40 %.   2. Moderate to severe left ventricular hypertrophy.   3. Normal right ventricular cavity size, with normal wall thickness, and normal right ventricular systolic function.   4. A bioprosthetic valve replacement valve replacement is present in the aortic position The prosthetic valve is well seated with normal function.   5. Bioprosthetic valve present. in the mitral position. Well seated prosthetic mitral valve with normal function. There is trace intravalvular mitral regurgitation.   6. Estimated pulmonary artery systolic pressure is 83 mmHg, consistent with severe pulmonary hypertension.   7. The inferior vena cava is dilated measuring 2.53 cm in diameter, (dilated >2.1cm) with abnormal inspiratory collapse (abnormal <50%) consistent with elevated right atrial pressure (~15, range 10-20mmHg).   8. No pericardial effusion seen.      [ ]  Catheterization:  Left cath unsuccessful  RHC -    Pa 52/17, Pcwp 17 RA 8 co 9.68  ci 5.65	  < from: CT Angio Cardiac w/ IV Cont (05.23.25 @ 09:15) >  1. Coronary artery calcium score:4754 AU  percentile ( Ref. Data   base is Leanne 2001*).  2. Significant multivessel CAD with multiple areas of indeterminate   stenosis due to low aymczyux-xh-qrqpu ratio, poorvessel opacification   and significant blooming artifact from dense calcium deposits.  3. Unchanged small pleural effusions and improved interstitial pulmonary   edema since 5/19/2025.  4. Unchanged mediastinal lymphadenopathy, may be reactive in this   clinical setting.    < from: CT Angio Cardiac w/ IV Cont (05.23.25 @ 09:15) >  Grafts: A patent vein graft from lower ascending aorta to right posterior   descending artery is visualized. A vein graft from ascending aorta to   second obtuse marginal was not visualized.

## 2025-05-27 LAB
ALBUMIN SERPL ELPH-MCNC: 3.2 G/DL — LOW (ref 3.3–5.2)
ALLERGY+IMMUNOLOGY DIAG STUDY NOTE: SIGNIFICANT CHANGE UP
ALP SERPL-CCNC: 85 U/L — SIGNIFICANT CHANGE UP (ref 40–120)
ALT FLD-CCNC: <5 U/L — SIGNIFICANT CHANGE UP
ANION GAP SERPL CALC-SCNC: 19 MMOL/L — HIGH (ref 5–17)
AST SERPL-CCNC: 10 U/L — SIGNIFICANT CHANGE UP
BASOPHILS # BLD AUTO: 0.03 K/UL — SIGNIFICANT CHANGE UP (ref 0–0.2)
BASOPHILS # BLD MANUAL: 0.16 K/UL — SIGNIFICANT CHANGE UP (ref 0–0.2)
BASOPHILS NFR BLD AUTO: 0.5 % — SIGNIFICANT CHANGE UP (ref 0–2)
BASOPHILS NFR BLD MANUAL: 2.6 % — HIGH (ref 0–2)
BILIRUB SERPL-MCNC: 1 MG/DL — SIGNIFICANT CHANGE UP (ref 0.4–2)
BLD GP AB SCN SERPL QL: SIGNIFICANT CHANGE UP
BUN SERPL-MCNC: 56 MG/DL — HIGH (ref 8–20)
CALCIUM SERPL-MCNC: 8.4 MG/DL — SIGNIFICANT CHANGE UP (ref 8.4–10.5)
CHLORIDE SERPL-SCNC: 96 MMOL/L — SIGNIFICANT CHANGE UP (ref 96–108)
CO2 SERPL-SCNC: 20 MMOL/L — LOW (ref 22–29)
COMMENT - BLOOD BANK: SIGNIFICANT CHANGE UP
CREAT SERPL-MCNC: 8.82 MG/DL — HIGH (ref 0.5–1.3)
DIR ANTIGLOB POLYSPECIFIC INTERPRETATION: SIGNIFICANT CHANGE UP
EGFR: 6 ML/MIN/1.73M2 — LOW
EGFR: 6 ML/MIN/1.73M2 — LOW
EOSINOPHIL # BLD AUTO: 0.5 K/UL — SIGNIFICANT CHANGE UP (ref 0–0.5)
EOSINOPHIL # BLD MANUAL: 0.36 K/UL — SIGNIFICANT CHANGE UP (ref 0–0.5)
EOSINOPHIL NFR BLD AUTO: 8.3 % — HIGH (ref 0–6)
EOSINOPHIL NFR BLD MANUAL: 6 % — SIGNIFICANT CHANGE UP (ref 0–6)
FERRITIN SERPL-MCNC: 810 NG/ML — HIGH (ref 30–400)
GLUCOSE SERPL-MCNC: 84 MG/DL — SIGNIFICANT CHANGE UP (ref 70–99)
HCT VFR BLD CALC: 22.3 % — LOW (ref 39–50)
HGB BLD-MCNC: 7.1 G/DL — LOW (ref 13–17)
IMM GRANULOCYTES # BLD AUTO: 0.02 K/UL — SIGNIFICANT CHANGE UP (ref 0–0.07)
IMM GRANULOCYTES NFR BLD AUTO: 0.3 % — SIGNIFICANT CHANGE UP (ref 0–0.9)
IMMATURE PLATELET FRACTION #: 3.3 K/UL — LOW (ref 3.9–12.5)
IMMATURE PLATELET FRACTION %: 3.8 % — SIGNIFICANT CHANGE UP (ref 1.6–7.1)
IRON SATN MFR SERPL: 17 % — SIGNIFICANT CHANGE UP (ref 16–55)
IRON SATN MFR SERPL: 35 UG/DL — LOW (ref 59–158)
LYMPHOCYTES # BLD AUTO: 0.8 K/UL — LOW (ref 1–3.3)
LYMPHOCYTES # BLD MANUAL: 0.57 K/UL — LOW (ref 1–3.3)
LYMPHOCYTES NFR BLD AUTO: 13.2 % — SIGNIFICANT CHANGE UP (ref 13–44)
LYMPHOCYTES NFR BLD MANUAL: 9.5 % — LOW (ref 13–44)
MAGNESIUM SERPL-MCNC: 2 MG/DL — SIGNIFICANT CHANGE UP (ref 1.6–2.6)
MANUAL REACTIVE LYMPHOCYTES #: 0.1 K/UL — SIGNIFICANT CHANGE UP (ref 0–0.63)
MCHC RBC-ENTMCNC: 31.3 PG — SIGNIFICANT CHANGE UP (ref 27–34)
MCHC RBC-ENTMCNC: 31.8 G/DL — LOW (ref 32–36)
MCV RBC AUTO: 98.2 FL — SIGNIFICANT CHANGE UP (ref 80–100)
MONOCYTES # BLD AUTO: 0.62 K/UL — SIGNIFICANT CHANGE UP (ref 0–0.9)
MONOCYTES # BLD MANUAL: 0.21 K/UL — SIGNIFICANT CHANGE UP (ref 0–0.9)
MONOCYTES NFR BLD AUTO: 10.2 % — SIGNIFICANT CHANGE UP (ref 2–14)
MONOCYTES NFR BLD MANUAL: 3.4 % — SIGNIFICANT CHANGE UP (ref 2–14)
NEUTROPHILS # BLD AUTO: 4.08 K/UL — SIGNIFICANT CHANGE UP (ref 1.8–7.4)
NEUTROPHILS # BLD MANUAL: 4.65 K/UL — SIGNIFICANT CHANGE UP (ref 1.8–7.4)
NEUTROPHILS NFR BLD AUTO: 67.5 % — SIGNIFICANT CHANGE UP (ref 43–77)
NEUTROPHILS NFR BLD MANUAL: 76.8 % — SIGNIFICANT CHANGE UP (ref 43–77)
NRBC # BLD AUTO: 0 K/UL — SIGNIFICANT CHANGE UP (ref 0–0)
NRBC # FLD: 0 K/UL — SIGNIFICANT CHANGE UP (ref 0–0)
NRBC BLD AUTO-RTO: 0 /100 WBCS — SIGNIFICANT CHANGE UP (ref 0–0)
PHOSPHATE SERPL-MCNC: 6.9 MG/DL — HIGH (ref 2.4–4.7)
PLAT MORPH BLD: NORMAL — SIGNIFICANT CHANGE UP
PLATELET # BLD AUTO: 87 K/UL — LOW (ref 150–400)
PLATELET COUNT - ESTIMATE: NORMAL — SIGNIFICANT CHANGE UP
PMV BLD: 11.4 FL — SIGNIFICANT CHANGE UP (ref 7–13)
POTASSIUM SERPL-MCNC: 4.6 MMOL/L — SIGNIFICANT CHANGE UP (ref 3.5–5.3)
POTASSIUM SERPL-SCNC: 4.6 MMOL/L — SIGNIFICANT CHANGE UP (ref 3.5–5.3)
PROT SERPL-MCNC: 5.6 G/DL — LOW (ref 6.6–8.7)
RBC # BLD: 2.27 M/UL — LOW (ref 4.2–5.8)
RBC # FLD: 16.2 % — HIGH (ref 10.3–14.5)
RBC BLD AUTO: ABNORMAL
SODIUM SERPL-SCNC: 135 MMOL/L — SIGNIFICANT CHANGE UP (ref 135–145)
TIBC SERPL-MCNC: 212 UG/DL — LOW (ref 220–430)
TRANSFERRIN SERPL-MCNC: 148 MG/DL — LOW (ref 180–329)
VARIANT LYMPHS # BLD: 1.7 % — SIGNIFICANT CHANGE UP (ref 0–6)
VARIANT LYMPHS NFR BLD MANUAL: 1.7 % — SIGNIFICANT CHANGE UP (ref 0–6)
WBC # BLD: 6.05 K/UL — SIGNIFICANT CHANGE UP (ref 3.8–10.5)
WBC # FLD AUTO: 6.05 K/UL — SIGNIFICANT CHANGE UP (ref 3.8–10.5)

## 2025-05-27 PROCEDURE — 86880 COOMBS TEST DIRECT: CPT

## 2025-05-27 PROCEDURE — 83605 ASSAY OF LACTIC ACID: CPT

## 2025-05-27 PROCEDURE — 84132 ASSAY OF SERUM POTASSIUM: CPT

## 2025-05-27 PROCEDURE — 96376 TX/PRO/DX INJ SAME DRUG ADON: CPT

## 2025-05-27 PROCEDURE — 80048 BASIC METABOLIC PNL TOTAL CA: CPT

## 2025-05-27 PROCEDURE — 33274 TCAT INSJ/RPL PERM LDLS PM: CPT

## 2025-05-27 PROCEDURE — 96374 THER/PROPH/DIAG INJ IV PUSH: CPT

## 2025-05-27 PROCEDURE — 96375 TX/PRO/DX INJ NEW DRUG ADDON: CPT

## 2025-05-27 PROCEDURE — 82330 ASSAY OF CALCIUM: CPT

## 2025-05-27 PROCEDURE — 99232 SBSQ HOSP IP/OBS MODERATE 35: CPT | Mod: FS

## 2025-05-27 PROCEDURE — 87641 MR-STAPH DNA AMP PROBE: CPT

## 2025-05-27 PROCEDURE — 87040 BLOOD CULTURE FOR BACTERIA: CPT

## 2025-05-27 PROCEDURE — 36415 COLL VENOUS BLD VENIPUNCTURE: CPT

## 2025-05-27 PROCEDURE — 87340 HEPATITIS B SURFACE AG IA: CPT

## 2025-05-27 PROCEDURE — 84295 ASSAY OF SERUM SODIUM: CPT

## 2025-05-27 PROCEDURE — 85025 COMPLETE CBC W/AUTO DIFF WBC: CPT

## 2025-05-27 PROCEDURE — 84436 ASSAY OF TOTAL THYROXINE: CPT

## 2025-05-27 PROCEDURE — 93971 EXTREMITY STUDY: CPT

## 2025-05-27 PROCEDURE — 73206 CT ANGIO UPR EXTRM W/O&W/DYE: CPT | Mod: MC

## 2025-05-27 PROCEDURE — 84443 ASSAY THYROID STIM HORMONE: CPT

## 2025-05-27 PROCEDURE — 82435 ASSAY OF BLOOD CHLORIDE: CPT

## 2025-05-27 PROCEDURE — 99261: CPT

## 2025-05-27 PROCEDURE — C1887: CPT

## 2025-05-27 PROCEDURE — 80053 COMPREHEN METABOLIC PANEL: CPT

## 2025-05-27 PROCEDURE — 86850 RBC ANTIBODY SCREEN: CPT

## 2025-05-27 PROCEDURE — 84100 ASSAY OF PHOSPHORUS: CPT

## 2025-05-27 PROCEDURE — 71046 X-RAY EXAM CHEST 2 VIEWS: CPT

## 2025-05-27 PROCEDURE — 80307 DRUG TEST PRSMV CHEM ANLYZR: CPT

## 2025-05-27 PROCEDURE — 93005 ELECTROCARDIOGRAM TRACING: CPT

## 2025-05-27 PROCEDURE — 71045 X-RAY EXAM CHEST 1 VIEW: CPT

## 2025-05-27 PROCEDURE — 99233 SBSQ HOSP IP/OBS HIGH 50: CPT

## 2025-05-27 PROCEDURE — 86870 RBC ANTIBODY IDENTIFICATION: CPT

## 2025-05-27 PROCEDURE — 86703 HIV-1/HIV-2 1 RESULT ANTBDY: CPT

## 2025-05-27 PROCEDURE — 85014 HEMATOCRIT: CPT

## 2025-05-27 PROCEDURE — 83735 ASSAY OF MAGNESIUM: CPT

## 2025-05-27 PROCEDURE — 85027 COMPLETE CBC AUTOMATED: CPT

## 2025-05-27 PROCEDURE — 83690 ASSAY OF LIPASE: CPT

## 2025-05-27 PROCEDURE — 82746 ASSAY OF FOLIC ACID SERUM: CPT

## 2025-05-27 PROCEDURE — 84480 ASSAY TRIIODOTHYRONINE (T3): CPT

## 2025-05-27 PROCEDURE — 82803 BLOOD GASES ANY COMBINATION: CPT

## 2025-05-27 PROCEDURE — 86901 BLOOD TYPING SEROLOGIC RH(D): CPT

## 2025-05-27 PROCEDURE — 84484 ASSAY OF TROPONIN QUANT: CPT

## 2025-05-27 PROCEDURE — 93306 TTE W/DOPPLER COMPLETE: CPT

## 2025-05-27 PROCEDURE — 93990 DOPPLER FLOW TESTING: CPT

## 2025-05-27 PROCEDURE — 99285 EMERGENCY DEPT VISIT HI MDM: CPT

## 2025-05-27 PROCEDURE — 86900 BLOOD TYPING SEROLOGIC ABO: CPT

## 2025-05-27 PROCEDURE — 76942 ECHO GUIDE FOR BIOPSY: CPT

## 2025-05-27 PROCEDURE — C1894: CPT

## 2025-05-27 PROCEDURE — 77001 FLUOROGUIDE FOR VEIN DEVICE: CPT

## 2025-05-27 PROCEDURE — 85018 HEMOGLOBIN: CPT

## 2025-05-27 PROCEDURE — 87640 STAPH A DNA AMP PROBE: CPT

## 2025-05-27 PROCEDURE — C1605: CPT

## 2025-05-27 PROCEDURE — 82607 VITAMIN B-12: CPT

## 2025-05-27 PROCEDURE — C1750: CPT

## 2025-05-27 PROCEDURE — C1769: CPT

## 2025-05-27 PROCEDURE — 90935 HEMODIALYSIS ONE EVALUATION: CPT

## 2025-05-27 PROCEDURE — 85730 THROMBOPLASTIN TIME PARTIAL: CPT

## 2025-05-27 PROCEDURE — 86922 COMPATIBILITY TEST ANTIGLOB: CPT

## 2025-05-27 PROCEDURE — 99291 CRITICAL CARE FIRST HOUR: CPT

## 2025-05-27 PROCEDURE — 85610 PROTHROMBIN TIME: CPT

## 2025-05-27 PROCEDURE — 82947 ASSAY GLUCOSE BLOOD QUANT: CPT

## 2025-05-27 PROCEDURE — 81001 URINALYSIS AUTO W/SCOPE: CPT

## 2025-05-27 RX ORDER — SACUBITRIL AND VALSARTAN 49; 51 MG/1; MG/1
1 TABLET, FILM COATED ORAL
Refills: 0 | Status: DISCONTINUED | OUTPATIENT
Start: 2025-05-27 | End: 2025-05-28

## 2025-05-27 RX ORDER — OXYCODONE HYDROCHLORIDE 30 MG/1
10 TABLET ORAL EVERY 4 HOURS
Refills: 0 | Status: DISCONTINUED | OUTPATIENT
Start: 2025-05-27 | End: 2025-06-02

## 2025-05-27 RX ORDER — HYDROMORPHONE/SOD CHLOR,ISO/PF 2 MG/10 ML
0.5 SYRINGE (ML) INJECTION ONCE
Refills: 0 | Status: DISCONTINUED | OUTPATIENT
Start: 2025-05-27 | End: 2025-05-28

## 2025-05-27 RX ORDER — OXYCODONE HYDROCHLORIDE 30 MG/1
5 TABLET ORAL EVERY 4 HOURS
Refills: 0 | Status: DISCONTINUED | OUTPATIENT
Start: 2025-05-27 | End: 2025-06-02

## 2025-05-27 RX ADMIN — SACUBITRIL AND VALSARTAN 1 TABLET(S): 49; 51 TABLET, FILM COATED ORAL at 17:53

## 2025-05-27 RX ADMIN — ISOSORBIDE MONONITRATE 60 MILLIGRAM(S): 60 TABLET, EXTENDED RELEASE ORAL at 13:35

## 2025-05-27 RX ADMIN — Medication 650 MILLIGRAM(S): at 00:16

## 2025-05-27 RX ADMIN — OXYCODONE HYDROCHLORIDE 10 MILLIGRAM(S): 30 TABLET ORAL at 20:49

## 2025-05-27 RX ADMIN — Medication 81 MILLIGRAM(S): at 13:33

## 2025-05-27 RX ADMIN — OXYCODONE HYDROCHLORIDE 10 MILLIGRAM(S): 30 TABLET ORAL at 15:04

## 2025-05-27 RX ADMIN — CARVEDILOL 6.25 MILLIGRAM(S): 3.12 TABLET, FILM COATED ORAL at 08:54

## 2025-05-27 RX ADMIN — OXYCODONE HYDROCHLORIDE 10 MILLIGRAM(S): 30 TABLET ORAL at 19:49

## 2025-05-27 RX ADMIN — EPOETIN ALFA 10000 UNIT(S): 10000 SOLUTION INTRAVENOUS; SUBCUTANEOUS at 11:07

## 2025-05-27 RX ADMIN — Medication 1 APPLICATION(S): at 05:16

## 2025-05-27 RX ADMIN — HEPARIN SODIUM 5000 UNIT(S): 1000 INJECTION INTRAVENOUS; SUBCUTANEOUS at 17:52

## 2025-05-27 RX ADMIN — TAMSULOSIN HYDROCHLORIDE 0.4 MILLIGRAM(S): 0.4 CAPSULE ORAL at 21:27

## 2025-05-27 RX ADMIN — HEPARIN SODIUM 5000 UNIT(S): 1000 INJECTION INTRAVENOUS; SUBCUTANEOUS at 05:17

## 2025-05-27 RX ADMIN — OXYCODONE HYDROCHLORIDE 10 MILLIGRAM(S): 30 TABLET ORAL at 03:03

## 2025-05-27 RX ADMIN — LEVETIRACETAM 500 MILLIGRAM(S): 10 INJECTION, SOLUTION INTRAVENOUS at 21:27

## 2025-05-27 RX ADMIN — FOLIC ACID 1 MILLIGRAM(S): 1 TABLET ORAL at 13:34

## 2025-05-27 RX ADMIN — LEVETIRACETAM 500 MILLIGRAM(S): 10 INJECTION, SOLUTION INTRAVENOUS at 08:54

## 2025-05-27 RX ADMIN — Medication 667 MILLIGRAM(S): at 13:34

## 2025-05-27 RX ADMIN — Medication 667 MILLIGRAM(S): at 17:53

## 2025-05-27 RX ADMIN — OXYCODONE HYDROCHLORIDE 10 MILLIGRAM(S): 30 TABLET ORAL at 15:25

## 2025-05-27 RX ADMIN — OXYCODONE HYDROCHLORIDE 10 MILLIGRAM(S): 30 TABLET ORAL at 04:03

## 2025-05-27 RX ADMIN — ATORVASTATIN CALCIUM 40 MILLIGRAM(S): 80 TABLET, FILM COATED ORAL at 21:28

## 2025-05-27 RX ADMIN — Medication 40 MILLIGRAM(S): at 13:34

## 2025-05-27 RX ADMIN — CARVEDILOL 6.25 MILLIGRAM(S): 3.12 TABLET, FILM COATED ORAL at 21:27

## 2025-05-27 RX ADMIN — Medication 667 MILLIGRAM(S): at 08:54

## 2025-05-27 RX ADMIN — OXYCODONE HYDROCHLORIDE 10 MILLIGRAM(S): 30 TABLET ORAL at 08:57

## 2025-05-27 RX ADMIN — Medication 650 MILLIGRAM(S): at 01:16

## 2025-05-27 NOTE — PROGRESS NOTE ADULT - SUBJECTIVE AND OBJECTIVE BOX
Patient was seen and evaluated on dialysis.   Patient is tolerating the procedure well.   T(C): 36.6 (05-27-25 @ 09:12), Max: 36.7 (05-26-25 @ 11:54)  HR: 84 (05-27-25 @ 09:12) (63 - 84)  BP: 159/91 (05-27-25 @ 09:12) (115/55 - 165/72)  Continue dialysis:   Dialyzer:  revaclear 300 QB: 300       QD: 500ml.,  Goal UF 1 kg over 3 Hours   Hb 7.1- PRBC transfusion , REINALDO given

## 2025-05-27 NOTE — PROGRESS NOTE ADULT - ASSESSMENT
64M, here with hypoxic respiratory failure 2/2 CHF, had a R femoral access for a heart cath, c/b a groin hematoma, status post exploration, arterial repair, evacuation of hematoma    Monitor groin   Monitor pulse exam, palpable  Transfuse to maintain Hgb above 7, 8 if active cardiac issues   Monitor drain output

## 2025-05-27 NOTE — PROGRESS NOTE ADULT - SUBJECTIVE AND OBJECTIVE BOX
CAITLIN.                                                          [facility]                                                         CARDIOLOGY AT [place] [address]                                                         Telephone: [phone]. Fax:[phone]                                                                             PROGRESS NOTE    Chief Complaint upon presentation to hospital:  Initial reason for Consult:   Reason for follow up:     Review of symptoms:   Cardiac:  No chest pain. No dyspnea. No palpitations.  Respiratory: no cough. No dyspnea  Gastrointestinal: No diarrhea. No abdominal pain. No bleeding.   Neuro: No focal neuro complaints.  All other ROS negative unless otherwise listed above    PHYSICAL EXAM:  Appearance: Comfortable. No acute distress  HEENT:  Atraumatic. Normocephalic.  Normal oral mucosa  Neurologic: A & O x 3, no gross focal deficits.  Cardiovascular: RRR S1 S2, No murmur, no rubs/gallops. No JVD  Respiratory: Lungs clear to auscultation, unlabored   Gastrointestinal:  Soft, Non-tender, + BS  Lower Extremities: 2+ Peripheral Pulses, No clubbing, cyanosis, or edema  Psychiatry: Patient is calm. No agitation.   Skin: warm and dry.    Vitals:  T(C): 36.6 (05-27-25 @ 04:34), Max: 36.7 (05-26-25 @ 11:54)  HR: 64 (05-27-25 @ 04:34) (63 - 76)  BP: 147/66 (05-27-25 @ 04:34) (115/55 - 160/67)  RR: 18 (05-27-25 @ 04:34) (18 - 18)  SpO2: 98% (05-27-25 @ 04:34) (94% - 98%)  Wt(kg): --  I&O's Summary    26 May 2025 07:01  -  27 May 2025 07:00  --------------------------------------------------------  IN: 430 mL / OUT: 425 mL / NET: 5 mL    Weight (kg): 63.5 (05-23 @ 04:24)    CURRENT CARDIAC MEDICATIONS:  carvedilol 6.25 milliGRAM(s) Oral every 12 hours  hydrALAZINE Injectable 10 milliGRAM(s) IV Push every 3 hours PRN  isosorbide   mononitrate ER Tablet (IMDUR) 60 milliGRAM(s) Oral daily  sacubitril 24 mG/valsartan 26 mG 1 Tablet(s) Oral two times a day    CURRENT OTHER MEDICATIONS:  acetaminophen     Tablet .. 650 milliGRAM(s) Oral every 6 hours PRN Mild Pain (1 - 3)  HYDROmorphone  Injectable 0.5 milliGRAM(s) IV Push once PRN dressing  levETIRAcetam 500 milliGRAM(s) Oral two times a day  ondansetron Injectable 4 milliGRAM(s) IV Push every 8 hours PRN Nausea and/or Vomiting  oxyCODONE    IR 10 milliGRAM(s) Oral every 6 hours PRN Severe Pain (7 - 10)  oxyCODONE    IR 5 milliGRAM(s) Oral every 6 hours PRN Moderate Pain (4 - 6)  pantoprazole  Injectable 40 milliGRAM(s) IV Push daily  atorvastatin 40 milliGRAM(s) Oral at bedtime  aspirin  chewable 81 milliGRAM(s) Oral daily  calcium acetate 667 milliGRAM(s) Oral three times a day with meals  chlorhexidine 2% Cloths 1 Application(s) Topical <User Schedule>  epoetin yolanda-epbx (RETACRIT) Injectable 07801 Unit(s) IV Push <User Schedule>  folic acid 1 milliGRAM(s) Oral daily  heparin   Injectable 5000 Unit(s) SubCutaneous every 12 hours  sodium chloride 0.9% lock flush 10 milliLiter(s) IV Push every 1 hour PRN Pre/post blood products, medications, blood draw, and to maintain line patency  tamsulosin 0.4 milliGRAM(s) Oral at bedtime    LABS:	 	               7.1    6.05  )-----------( 87       ( 27 May 2025 04:00 )             22.3     05-27    135  |  96  |  56.0[H]  ----------------------------<  84  4.6   |  20.0[L]  |  8.82[H]    Ca    8.4      27 May 2025 04:00  Phos  6.9     05-27  Mg     2.0     05-27    TPro  5.6[L]  /  Alb  3.2[L]  /  TBili  1.0  /  DBili  x   /  AST  10  /  ALT  <5  /  AlkPhos  85  05-27    PT/INR/PTT ( 23 May 2025 20:29 )                       :                       :      15.0         :       31.9                  .        .                   .              .           .       1.30        .                                       Lipid Profile:   HgA1c:   TSH:                                                             [facility]                                                         CARDIOLOGY AT [facility]                                                                  [address], [place] [address]                                                         Telephone: [phone]. Fax:[phone]                                                                             PROGRESS NOTE    Review of symptoms:   Cardiac:  No chest pain. No dyspnea. No palpitations.  Respiratory: no cough. No dyspnea  Gastrointestinal: No diarrhea. No abdominal pain. No bleeding.   Neuro: No focal neuro complaints.  All other ROS negative unless otherwise listed above    PHYSICAL EXAM:  Appearance: Comfortable. No acute distress  HEENT:  Atraumatic. Normocephalic.  Normal oral mucosa  Neurologic: A & O x 3, no gross focal deficits.  Cardiovascular: RRR S1 S2, No murmur, no rubs/gallops. No JVD  Respiratory: Lungs clear to auscultation, unlabored   Gastrointestinal:  Soft, Non-tender, + BS  Lower Extremities: ext warm, no edema  Psychiatry: Patient is calm. No agitation.     Vitals:  T(C): 36.6 (05-27-25 @ 04:34), Max: 36.7 (05-26-25 @ 11:54)  HR: 64 (05-27-25 @ 04:34) (63 - 76)  BP: 147/66 (05-27-25 @ 04:34) (115/55 - 160/67)  RR: 18 (05-27-25 @ 04:34) (18 - 18)  SpO2: 98% (05-27-25 @ 04:34) (94% - 98%)  Wt(kg): --  I&O's Summary    26 May 2025 07:01  -  27 May 2025 07:00  --------------------------------------------------------  IN: 430 mL / OUT: 425 mL / NET: 5 mL    Weight (kg): 63.5 (05-23 @ 04:24)    CURRENT CARDIAC MEDICATIONS:  carvedilol 6.25 milliGRAM(s) Oral every 12 hours  hydrALAZINE Injectable 10 milliGRAM(s) IV Push every 3 hours PRN  isosorbide   mononitrate ER Tablet (IMDUR) 60 milliGRAM(s) Oral daily  sacubitril 24 mG/valsartan 26 mG 1 Tablet(s) Oral two times a day    CURRENT OTHER MEDICATIONS:  acetaminophen     Tablet .. 650 milliGRAM(s) Oral every 6 hours PRN Mild Pain (1 - 3)  HYDROmorphone  Injectable 0.5 milliGRAM(s) IV Push once PRN dressing  levETIRAcetam 500 milliGRAM(s) Oral two times a day  ondansetron Injectable 4 milliGRAM(s) IV Push every 8 hours PRN Nausea and/or Vomiting  oxyCODONE    IR 10 milliGRAM(s) Oral every 6 hours PRN Severe Pain (7 - 10)  oxyCODONE    IR 5 milliGRAM(s) Oral every 6 hours PRN Moderate Pain (4 - 6)  pantoprazole  Injectable 40 milliGRAM(s) IV Push daily  atorvastatin 40 milliGRAM(s) Oral at bedtime  aspirin  chewable 81 milliGRAM(s) Oral daily  calcium acetate 667 milliGRAM(s) Oral three times a day with meals  chlorhexidine 2% Cloths 1 Application(s) Topical <User Schedule>  epoetin yolanda-epbx (RETACRIT) Injectable 91021 Unit(s) IV Push <User Schedule>  folic acid 1 milliGRAM(s) Oral daily  heparin   Injectable 5000 Unit(s) SubCutaneous every 12 hours  sodium chloride 0.9% lock flush 10 milliLiter(s) IV Push every 1 hour PRN Pre/post blood products, medications, blood draw, and to maintain line patency  tamsulosin 0.4 milliGRAM(s) Oral at bedtime    LABS:	 	               7.1    6.05  )-----------( 87       ( 27 May 2025 04:00 )             22.3     05-27    135  |  96  |  56.0[H]  ----------------------------<  84  4.6   |  20.0[L]  |  8.82[H]    Ca    8.4      27 May 2025 04:00  Phos  6.9     05-27  Mg     2.0     05-27    TPro  5.6[L]  /  Alb  3.2[L]  /  TBili  1.0  /  DBili  x   /  AST  10  /  ALT  <5  /  AlkPhos  85  05-27    PT/INR/PTT ( 23 May 2025 20:29 )                       :                       :      15.0         :       31.9                  .        .                   .              .           .       1.30        .                                       Tele SR, 7 beat run of NSVT and 12 beat run of NSVT on 5/26/2025

## 2025-05-27 NOTE — PROGRESS NOTE ADULT - ASSESSMENT
65 y/o M with significant PMH ESRD s/p renal transplant on HD Tu/Th/Sat, type A aortic dissection repair (2010), ischemic bowel resection, CAD s/p CABG x 2 (SVG-OM1and SVG-RPDA), bioprosthetic AVR/MVR (2020 Dr. Butler), Afib s/p ablation (2021),PPM  LAAO device (8/2024), MV endocarditis (2023), remote history of substance abuse and former smoker who presented with c/o shortness of breath. Course complicated by Rt groin hematoma urgently taken to OR for exploration/ evacuation with Hemorrhagic shock requiring  4 prbc, 2 ffp, 1 plt.

## 2025-05-27 NOTE — PROGRESS NOTE ADULT - SUBJECTIVE AND OBJECTIVE BOX
[facility]                                                         CARDIOLOGY AT [facility]                                                                  [address], [place] [address]                                                         Telephone: [phone]. Fax:[phone]                                                                             PROGRESS NOTE    Reason for follow up: groin hematoma  Update: right groin pain      Review of symptoms:   Cardiac:  No chest pain. No dyspnea. No palpitations.  Respiratory: no cough. No dyspnea  Gastrointestinal: No diarrhea. No abdominal pain. No bleeding.   Neuro: No focal neuro complaints.    Vitals:  T(C): 36.6 (05-27-25 @ 04:34), Max: 36.7 (05-26-25 @ 11:54)  HR: 64 (05-27-25 @ 04:34) (63 - 76)  BP: 147/66 (05-27-25 @ 04:34) (115/55 - 160/67)  RR: 18 (05-27-25 @ 04:34) (18 - 18)  SpO2: 98% (05-27-25 @ 04:34) (94% - 98%)  Wt(kg): --  I&O's Summary    26 May 2025 07:01  -  27 May 2025 07:00  --------------------------------------------------------  IN: 430 mL / OUT: 425 mL / NET: 5 mL      Weight (kg): 63.5 (05-23 @ 04:24)    PHYSICAL EXAM:  Appearance: Comfortable. No acute distress  HEENT:  Atraumatic. Normocephalic.  Normal oral mucosa  Neurologic: A & O x 3, no gross focal deficits.  Cardiovascular: RRR S1 S2, No murmur, no rubs/gallops. No JVD  Respiratory: Lungs clear to auscultation, unlabored   Gastrointestinal:  Soft, Non-tender, + BS  right groin site with bandage, patient has "burning pain". DEDRICK in place  Lower Extremities: 2+ Peripheral Pulses, No clubbing, cyanosis, or edema  Psychiatry: Patient is calm. No agitation.   Skin: warm and dry.    CURRENT CARDIAC MEDICATIONS:  carvedilol 6.25 milliGRAM(s) Oral every 12 hours  hydrALAZINE Injectable 10 milliGRAM(s) IV Push every 3 hours PRN  isosorbide   mononitrate ER Tablet (IMDUR) 60 milliGRAM(s) Oral daily  sacubitril 24 mG/valsartan 26 mG 1 Tablet(s) Oral two times a day      CURRENT OTHER MEDICATIONS:  acetaminophen     Tablet .. 650 milliGRAM(s) Oral every 6 hours PRN Mild Pain (1 - 3)  HYDROmorphone  Injectable 0.5 milliGRAM(s) IV Push once PRN dressing  levETIRAcetam 500 milliGRAM(s) Oral two times a day  ondansetron Injectable 4 milliGRAM(s) IV Push every 8 hours PRN Nausea and/or Vomiting  oxyCODONE    IR 10 milliGRAM(s) Oral every 6 hours PRN Severe Pain (7 - 10)  oxyCODONE    IR 5 milliGRAM(s) Oral every 6 hours PRN Moderate Pain (4 - 6)  pantoprazole  Injectable 40 milliGRAM(s) IV Push daily  atorvastatin 40 milliGRAM(s) Oral at bedtime  aspirin  chewable 81 milliGRAM(s) Oral daily  calcium acetate 667 milliGRAM(s) Oral three times a day with meals  chlorhexidine 2% Cloths 1 Application(s) Topical <User Schedule>  epoetin yolanda-epbx (RETACRIT) Injectable 09260 Unit(s) IV Push <User Schedule>  folic acid 1 milliGRAM(s) Oral daily  heparin   Injectable 5000 Unit(s) SubCutaneous every 12 hours  sodium chloride 0.9% lock flush 10 milliLiter(s) IV Push every 1 hour PRN Pre/post blood products, medications, blood draw, and to maintain line patency  tamsulosin 0.4 milliGRAM(s) Oral at bedtime      LABS:	 	                            7.1    6.05  )-----------( 87       ( 27 May 2025 04:00 )             22.3     05-27    135  |  96  |  56.0[H]  ----------------------------<  84  4.6   |  20.0[L]  |  8.82[H]    Ca    8.4      27 May 2025 04:00  Phos  6.9     05-27  Mg     2.0     05-27    TPro  5.6[L]  /  Alb  3.2[L]  /  TBili  1.0  /  DBili  x   /  AST  10  /  ALT  <5  /  AlkPhos  85  05-27    PT/INR/PTT ( 23 May 2025 20:29 )                       :                       :      15.0         :       31.9                  .        .                   .              .           .       1.30        .                                               Assessment and Plan:   · Assessment	  65 y/o M with significant PMH ESRD s/p renal transplant on HD Tu/Th/Sat, type A aortic dissection repair (2010), ischemic bowel resection, CAD s/p CABG x 2 (SVG-OM1and SVG-RPDA), bioprosthetic AVR/MVR (2020 Dr. Butler), Afib s/p ablation (2021),PPM  LAAO device (8/2024), MV endocarditis (2023), remote history of substance abuse and former smoker who presented with c/o shortness of breath. Course complicated by Rt groin hematoma urgently taken to OR for exploration/ evacuation with Hemorrhagic shock requiring  4 prbc, 2 ffp, 1 plt.       Problem/Plan - 1:  ·  Problem: CAD (coronary artery disease).   ·  Plan: Medical management   s/p groin hematoma requiring exploration  hgb is stable, trend and keep HGB >8  back on ASA.  - s/p RCH 5/22 showed mildly elevated filling pressures and increased CO  - s/p LHC 5/22 was attempted via RFA access but unable to pass through aortic root in view of prior type A dissection repair   - s/p CTA 05/23 revealed LM, LAD, RCA and circumflex were all reported to have diffuse significant calcified plaque noted w/ multiple areas of indeterminate stenoses.  Diagonal branches, OM, PDA, posterior branch and ramus intermedius were not well visualized.    - c/w coreg and statin        Problem/Plan - 2:  ·  Problem: Acute systolic heart failure.   ·  Plan: appears euvolemic    dialysis for fluid management  C/w BB, restart hydralazine 100mg BID (afterload reduction).   c/w Imdur                                                                        [facility]                                                         CARDIOLOGY AT [facility]                                                                  [address], [place] [address]                                                         Telephone: [phone]. Fax:[phone]                                                                             PROGRESS NOTE    Reason for follow up: groin hematoma  Update: right groin pain      Review of symptoms:   Cardiac:  No chest pain. No dyspnea. No palpitations.  Respiratory: no cough. No dyspnea  Gastrointestinal: No diarrhea. No abdominal pain. No bleeding.   Neuro: No focal neuro complaints.    Vitals:  T(C): 36.6 (05-27-25 @ 04:34), Max: 36.7 (05-26-25 @ 11:54)  HR: 64 (05-27-25 @ 04:34) (63 - 76)  BP: 147/66 (05-27-25 @ 04:34) (115/55 - 160/67)  RR: 18 (05-27-25 @ 04:34) (18 - 18)  SpO2: 98% (05-27-25 @ 04:34) (94% - 98%)  Wt(kg): --  I&O's Summary    26 May 2025 07:01  -  27 May 2025 07:00  --------------------------------------------------------  IN: 430 mL / OUT: 425 mL / NET: 5 mL      Weight (kg): 63.5 (05-23 @ 04:24)    PHYSICAL EXAM:  Appearance: Comfortable. No acute distress  HEENT:  Atraumatic. Normocephalic.  Normal oral mucosa  Neurologic: A & O x 3, no gross focal deficits.  Cardiovascular: RRR S1 S2, No murmur, no rubs/gallops. No JVD  Respiratory: Lungs clear to auscultation, unlabored   Gastrointestinal:  Soft, Non-tender, + BS  right groin site with bandage, patient has "burning pain". DEDRICK in place  Lower Extremities: 2+ Peripheral Pulses, No clubbing, cyanosis, or edema  Psychiatry: Patient is calm. No agitation.   Skin: warm and dry.    CURRENT CARDIAC MEDICATIONS:  carvedilol 6.25 milliGRAM(s) Oral every 12 hours  hydrALAZINE Injectable 10 milliGRAM(s) IV Push every 3 hours PRN  isosorbide   mononitrate ER Tablet (IMDUR) 60 milliGRAM(s) Oral daily  sacubitril 24 mG/valsartan 26 mG 1 Tablet(s) Oral two times a day      CURRENT OTHER MEDICATIONS:  acetaminophen     Tablet .. 650 milliGRAM(s) Oral every 6 hours PRN Mild Pain (1 - 3)  HYDROmorphone  Injectable 0.5 milliGRAM(s) IV Push once PRN dressing  levETIRAcetam 500 milliGRAM(s) Oral two times a day  ondansetron Injectable 4 milliGRAM(s) IV Push every 8 hours PRN Nausea and/or Vomiting  oxyCODONE    IR 10 milliGRAM(s) Oral every 6 hours PRN Severe Pain (7 - 10)  oxyCODONE    IR 5 milliGRAM(s) Oral every 6 hours PRN Moderate Pain (4 - 6)  pantoprazole  Injectable 40 milliGRAM(s) IV Push daily  atorvastatin 40 milliGRAM(s) Oral at bedtime  aspirin  chewable 81 milliGRAM(s) Oral daily  calcium acetate 667 milliGRAM(s) Oral three times a day with meals  chlorhexidine 2% Cloths 1 Application(s) Topical <User Schedule>  epoetin yolanda-epbx (RETACRIT) Injectable 99150 Unit(s) IV Push <User Schedule>  folic acid 1 milliGRAM(s) Oral daily  heparin   Injectable 5000 Unit(s) SubCutaneous every 12 hours  sodium chloride 0.9% lock flush 10 milliLiter(s) IV Push every 1 hour PRN Pre/post blood products, medications, blood draw, and to maintain line patency  tamsulosin 0.4 milliGRAM(s) Oral at bedtime      LABS:	 	                            7.1    6.05  )-----------( 87       ( 27 May 2025 04:00 )             22.3     05-27    135  |  96  |  56.0[H]  ----------------------------<  84  4.6   |  20.0[L]  |  8.82[H]    Ca    8.4      27 May 2025 04:00  Phos  6.9     05-27  Mg     2.0     05-27    TPro  5.6[L]  /  Alb  3.2[L]  /  TBili  1.0  /  DBili  x   /  AST  10  /  ALT  <5  /  AlkPhos  85  05-27    PT/INR/PTT ( 23 May 2025 20:29 )                       :                       :      15.0         :       31.9                  .        .                   .              .           .       1.30        .                                               Assessment and Plan:   · Assessment	  63 y/o M with significant PMH ESRD s/p renal transplant on HD Tu/Th/Sat, type A aortic dissection repair (2010), ischemic bowel resection, CAD s/p CABG x 2 (SVG-OM1and SVG-RPDA), bioprosthetic AVR/MVR (2020 Dr. Butler), Afib s/p ablation (2021),PPM  LAAO device (8/2024), MV endocarditis (2023), remote history of substance abuse and former smoker who presented with c/o shortness of breath. Course complicated by Rt groin hematoma urgently taken to OR for exploration/ evacuation with Hemorrhagic shock requiring  4 prbc, 2 ffp, 1 plt.       Problem/Plan - 1:  ·  Problem: CAD (coronary artery disease).   ·  Plan: Medical management   s/p groin hematoma requiring exploration  hgb is stable, trend and keep HGB >7  back on ASA.  - s/p RCH 5/22 showed mildly elevated filling pressures and increased CO  - s/p LHC 5/22 was attempted via RFA access but unable to pass through aortic root in view of prior type A dissection repair   - s/p CTA 05/23 revealed LM, LAD, RCA and circumflex were all reported to have diffuse significant calcified plaque noted w/ multiple areas of indeterminate stenoses.  Diagonal branches, OM, PDA, posterior branch and ramus intermedius were not well visualized.    - c/w coreg and statin        Problem/Plan - 2:  ·  Problem: Acute systolic heart failure.   ·  Plan: appears euvolemic    dialysis for fluid management  C/w BB, restart hydralazine 100mg BID (afterload reduction).   c/w Imdur                                                                        [facility]                                                         CARDIOLOGY AT [facility]                                                                  [address], [place] [address]                                                         Telephone: [phone]. Fax:[phone]                                                                             PROGRESS NOTE    Reason for follow up: groin hematoma  Update: right groin pain      Review of symptoms:   Cardiac:  No chest pain. No dyspnea. No palpitations.  Respiratory: no cough. No dyspnea  Gastrointestinal: No diarrhea. No abdominal pain. No bleeding.   Neuro: No focal neuro complaints.    Vitals:  T(C): 36.6 (05-27-25 @ 04:34), Max: 36.7 (05-26-25 @ 11:54)  HR: 64 (05-27-25 @ 04:34) (63 - 76)  BP: 147/66 (05-27-25 @ 04:34) (115/55 - 160/67)  RR: 18 (05-27-25 @ 04:34) (18 - 18)  SpO2: 98% (05-27-25 @ 04:34) (94% - 98%)  Wt(kg): --  I&O's Summary    26 May 2025 07:01  -  27 May 2025 07:00  --------------------------------------------------------  IN: 430 mL / OUT: 425 mL / NET: 5 mL      Weight (kg): 63.5 (05-23 @ 04:24)    PHYSICAL EXAM:  Appearance: Comfortable. No acute distress  HEENT:  Atraumatic. Normocephalic.  Normal oral mucosa  Neurologic: A & O x 3, no gross focal deficits.  Cardiovascular: RRR S1 S2, No murmur, no rubs/gallops. No JVD  Respiratory: Lungs clear to auscultation, unlabored   Gastrointestinal:  Soft, Non-tender, + BS  right groin site with bandage, patient has "burning pain". DEDRICK in place  Lower Extremities: 2+ Peripheral Pulses, No clubbing, cyanosis, or edema  Psychiatry: Patient is calm. No agitation.   Skin: warm and dry.    CURRENT CARDIAC MEDICATIONS:  carvedilol 6.25 milliGRAM(s) Oral every 12 hours  hydrALAZINE Injectable 10 milliGRAM(s) IV Push every 3 hours PRN  isosorbide   mononitrate ER Tablet (IMDUR) 60 milliGRAM(s) Oral daily  sacubitril 24 mG/valsartan 26 mG 1 Tablet(s) Oral two times a day      CURRENT OTHER MEDICATIONS:  acetaminophen     Tablet .. 650 milliGRAM(s) Oral every 6 hours PRN Mild Pain (1 - 3)  HYDROmorphone  Injectable 0.5 milliGRAM(s) IV Push once PRN dressing  levETIRAcetam 500 milliGRAM(s) Oral two times a day  ondansetron Injectable 4 milliGRAM(s) IV Push every 8 hours PRN Nausea and/or Vomiting  oxyCODONE    IR 10 milliGRAM(s) Oral every 6 hours PRN Severe Pain (7 - 10)  oxyCODONE    IR 5 milliGRAM(s) Oral every 6 hours PRN Moderate Pain (4 - 6)  pantoprazole  Injectable 40 milliGRAM(s) IV Push daily  atorvastatin 40 milliGRAM(s) Oral at bedtime  aspirin  chewable 81 milliGRAM(s) Oral daily  calcium acetate 667 milliGRAM(s) Oral three times a day with meals  chlorhexidine 2% Cloths 1 Application(s) Topical <User Schedule>  epoetin yolanda-epbx (RETACRIT) Injectable 22870 Unit(s) IV Push <User Schedule>  folic acid 1 milliGRAM(s) Oral daily  heparin   Injectable 5000 Unit(s) SubCutaneous every 12 hours  sodium chloride 0.9% lock flush 10 milliLiter(s) IV Push every 1 hour PRN Pre/post blood products, medications, blood draw, and to maintain line patency  tamsulosin 0.4 milliGRAM(s) Oral at bedtime      LABS:	 	                            7.1    6.05  )-----------( 87       ( 27 May 2025 04:00 )             22.3     05-27    135  |  96  |  56.0[H]  ----------------------------<  84  4.6   |  20.0[L]  |  8.82[H]    Ca    8.4      27 May 2025 04:00  Phos  6.9     05-27  Mg     2.0     05-27    TPro  5.6[L]  /  Alb  3.2[L]  /  TBili  1.0  /  DBili  x   /  AST  10  /  ALT  <5  /  AlkPhos  85  05-27    PT/INR/PTT ( 23 May 2025 20:29 )                       :                       :      15.0         :       31.9                  .        .                   .              .           .       1.30        .                                               Assessment and Plan:   · Assessment	  63 y/o M with significant PMH ESRD s/p renal transplant on HD Tu/Th/Sat, type A aortic dissection repair (2010), ischemic bowel resection, CAD s/p CABG x 2 (SVG-OM1and SVG-RPDA), bioprosthetic AVR/MVR (2020 Dr. Butler), Afib s/p ablation (2021),PPM  LAAO device (8/2024), MV endocarditis (2023), remote history of substance abuse and former smoker who presented with c/o shortness of breath. Course complicated by Rt groin hematoma urgently taken to OR for exploration/ evacuation with Hemorrhagic shock requiring  4 prbc, 2 ffp, 1 plt.   , s/p open cut-down and evacuation of 1L of fresh blood, femoral artery behind profunda vein was ligated and transected, 3 mm arteriotomy w/ active bleeding at common femoral artery bifurcation, repaired with suture and cessation of bleeding.   Palpation DP/PT on RLE. Left 15 Zac Zhao drain in groin.   Given 5/2/1 prbc/ffp/plt in OR and was left intubated due to concern for respiratory failure and volume overload in setting of blood products resuscitation   Now hemodynamically stable,  rt groin with no bleeding or hematoma dressing intact, Rt groin drain in place with minimal amount of serous drainage.  Groin tender to touch, Femoral popliteal, DP/PT pulses palpable, RLE warm and perfusing         Problem/Plan - 1:  ·  Problem: CAD (coronary artery disease).   ·  Plan: Medical management   s/p groin hematoma requiring exploration  hgb is stable, trend and keep HGB >7  back on ASA.  - s/p RCH 5/22 showed mildly elevated filling pressures and increased CO  - s/p LHC 5/22 was attempted via RFA access but unable to pass through aortic root in view of prior type A dissection repair   - s/p CTA 05/23 revealed LM, LAD, RCA and circumflex were all reported to have diffuse significant calcified plaque noted w/ multiple areas of indeterminate stenoses.  Diagonal branches, OM, PDA, posterior branch and ramus intermedius were not well visualized.    - c/w coreg and statin        Problem/Plan - 2:  ·  Problem: Acute systolic heart failure.   ·  Plan: appears euvolemic    dialysis for fluid management  C/w BB, restart hydralazine 100mg BID (afterload reduction).   c/w Imdur

## 2025-05-27 NOTE — PROGRESS NOTE ADULT - SUBJECTIVE AND OBJECTIVE BOX
INTERVAL HPI/OVERNIGHT EVENTS:    Patient evaluated at bedside. No acute distress. No acute events overnight.  Patient requesting dressing change later today.     MEDICATIONS  (STANDING):  aspirin  chewable 81 milliGRAM(s) Oral daily  atorvastatin 40 milliGRAM(s) Oral at bedtime  calcium acetate 667 milliGRAM(s) Oral three times a day with meals  carvedilol 6.25 milliGRAM(s) Oral every 12 hours  chlorhexidine 2% Cloths 1 Application(s) Topical <User Schedule>  epoetin yolanda-epbx (RETACRIT) Injectable 98365 Unit(s) IV Push <User Schedule>  folic acid 1 milliGRAM(s) Oral daily  heparin   Injectable 5000 Unit(s) SubCutaneous every 12 hours  isosorbide   mononitrate ER Tablet (IMDUR) 60 milliGRAM(s) Oral daily  levETIRAcetam 500 milliGRAM(s) Oral two times a day  pantoprazole  Injectable 40 milliGRAM(s) IV Push daily  tamsulosin 0.4 milliGRAM(s) Oral at bedtime    MEDICATIONS  (PRN):  acetaminophen     Tablet .. 650 milliGRAM(s) Oral every 6 hours PRN Mild Pain (1 - 3)  hydrALAZINE Injectable 10 milliGRAM(s) IV Push every 3 hours PRN sbp>160  ondansetron Injectable 4 milliGRAM(s) IV Push every 8 hours PRN Nausea and/or Vomiting  oxyCODONE    IR 10 milliGRAM(s) Oral every 6 hours PRN Severe Pain (7 - 10)  oxyCODONE    IR 5 milliGRAM(s) Oral every 6 hours PRN Moderate Pain (4 - 6)  sodium chloride 0.9% lock flush 10 milliLiter(s) IV Push every 1 hour PRN Pre/post blood products, medications, blood draw, and to maintain line patency      Vital Signs Last 24 Hrs  T(C): 36.6 (27 May 2025 04:34), Max: 36.7 (26 May 2025 11:54)  T(F): 97.9 (27 May 2025 04:34), Max: 98 (26 May 2025 11:54)  HR: 64 (27 May 2025 04:34) (63 - 76)  BP: 147/66 (27 May 2025 04:34) (115/55 - 160/67)  BP(mean): --  RR: 18 (27 May 2025 04:34) (18 - 18)  SpO2: 98% (27 May 2025 04:34) (94% - 98%)    Parameters below as of 26 May 2025 20:50  Patient On (Oxygen Delivery Method): room air        Constitutional: NAD  Respiratory: Nonlabored   Cardiovascular: Normotensive   Gastrointestinal: R groin soft, dressing with strikethrough,   Extremities: Palpable pulses    I&O's Detail    25 May 2025 07:01  -  26 May 2025 07:00  --------------------------------------------------------  IN:  Total IN: 0 mL    OUT:    Bulb (mL): 30 mL  Total OUT: 30 mL    Total NET: -30 mL      26 May 2025 07:01  -  27 May 2025 06:33  --------------------------------------------------------  IN:    Oral Fluid: 430 mL  Total IN: 430 mL    OUT:    Bulb (mL): 25 mL    Voided (mL): 400 mL  Total OUT: 425 mL    Total NET: 5 mL          LABS:                        7.1    6.05  )-----------( 87       ( 27 May 2025 04:00 )             22.3     05-27    135  |  96  |  56.0[H]  ----------------------------<  84  4.6   |  20.0[L]  |  8.82[H]    Ca    8.4      27 May 2025 04:00  Phos  6.9     05-27  Mg     2.0     05-27    TPro  5.6[L]  /  Alb  3.2[L]  /  TBili  1.0  /  DBili  x   /  AST  10  /  ALT  <5  /  AlkPhos  85  05-27      Urinalysis Basic - ( 27 May 2025 04:00 )    Color: x / Appearance: x / SG: x / pH: x  Gluc: 84 mg/dL / Ketone: x  / Bili: x / Urobili: x   Blood: x / Protein: x / Nitrite: x   Leuk Esterase: x / RBC: x / WBC x   Sq Epi: x / Non Sq Epi: x / Bacteria: x        RADIOLOGY & ADDITIONAL STUDIES:

## 2025-05-27 NOTE — PROGRESS NOTE ADULT - PROBLEM SELECTOR PLAN 1
- initially pt presented w/ SOB/volume overloaded after missed HD session and dietary indiscretions.   - elevated troponin in settings of ESRD   - EF was newly reduced EF 35-40% severe pulm HTN   - given extensive hx of CAD s/p CABG with elevated troponin could not r/o underlying ischemic cardiomyopathy  - s/p R+ [facility] 5/22/25 pt had significant tortuosity of ascending aorta (prior type A dissection repair), they were unable to advance catheter beyond aortic root, so coronary arteries/grafts could not be visualized. Cannot excluder worsening ischemia   - Elevated filling pressures but volume management is as per HD  (PCWP 17 on 5/22/25)   - interventional recommended obtaining coronary and graft CTA to clarify anatomy   - Continued optimal medical therapy and GDMT for HFrEF, advanced heart failure is following   - CTA coronary showed agatson score >4000   - CTA ABD aorta showed massive right groin hematoma, (right renal lesion consistent w/ neoplasm - as per hospitalist), and Partially visualized aortic dissection with enhancing false lumen. Celiac axis, SMA, bilateral renal arteries arising from the true lumen. Miniscule UMA also appears to arise from the true lumen. Dissection extends into the mid right external iliac artery. Vascular surgery is following   - Now s/p femoral cutdown/hematoma evacuation and arterial repair   - transfuse to maintain hgb >8 given CAD, can transfuse with HD   - Daily groin checks and PRN for any complaints/issues   - continue ASA, entresto, imdur, coreg, lipitor  - we will follow daily until discharge.

## 2025-05-27 NOTE — PROGRESS NOTE ADULT - NS ATTEND AMEND GEN_ALL_CORE FT
I agree with the assessment and plan as above. Patient was seen and examined by me. I edited the entire note.     64M with ESRD s/p renal transplant on HD Tu/Th/Sat, type A aortic dissection repair (2010), ischemic bowel resection, CAD s/p CABG x 2 (SVG-OM1and SVG-RPDA), bioprosthetic AVR/MVR (2020 Dr. Butler), Afib s/p ablation (2021), PPM,  LAAO device (8/2024), MV endocarditis (2023), remote history of substance abuse and former smoker who presented with c/o shortness of breath.   Course complicated by Rt groin hematoma urgently taken to OR for exploration/ evacuation with Hemorrhagic shock requiring  4 prbc, 2 ffp, 1 plt.     Impressions and recommendations  CAD s/p angiogram complicated s/p groin hematoma requiring exploration. s/p RCH 5/22 showed mildly elevated filling pressures and increased CO. S/p LHC 5/22/2025 was attempted via RFA access but unable to pass through aortic root in view of prior type A dissection repair. CTA 05/23/2025 revealed LM, LAD, RCA and circumflex were all reported to have diffuse significant calcified plaque noted w/ multiple areas of indeterminate stenoses. Diagonal branches, OM, PDA, posterior branch and ramus intermedius were not well visualized.    --c/w carvedilol, aspirin unless bleeding risk prohibitive, Imdur and statin   --vascular and IC following.    Acute systolic heart failure. S/p dialysis for fluid management  --C/w carvedilol and low-dose Entresto; can restart hydralazine for afterload reduction   --HF consult    PAfib- s/p ablation 9/2021. s/p Watchman procedure 8/2/2024.   --On ASA only.    S/p bioprosthetic AVR/MVR (2020 Dr. Butler).    The patient agreed with the plan. We will continue to follow along. I agree with the assessment and plan as above. Patient was seen and examined by me. I edited the entire note.     64M with ESRD s/p renal transplant on HD Tu/Th/Sat, type A aortic dissection repair (2010), ischemic bowel resection, CAD s/p CABG x 2 (SVG-OM1and SVG-RPDA), bioprosthetic AVR/MVR (2020 Dr. Butler), Afib s/p ablation (2021), PPM,  LAAO device (8/2024), MV endocarditis (2023), remote history of substance abuse and former smoker who presented with c/o shortness of breath.   Course complicated by Rt groin hematoma urgently taken to OR for exploration/ evacuation with Hemorrhagic shock requiring  4 prbc, 2 ffp, 1 plt.     Impressions and recommendations  CAD s/p angiogram complicated s/p groin hematoma requiring exploration. s/p RCH 5/22 showed mildly elevated filling pressures and increased CO. S/p LHC 5/22/2025 was attempted via RFA access but unable to pass through aortic root in view of prior type A dissection repair. CTA 05/23/2025 revealed LM, LAD, RCA and circumflex were all reported to have diffuse significant calcified plaque noted w/ multiple areas of indeterminate stenoses. Diagonal branches, OM, PDA, posterior branch and ramus intermedius were not well visualized.    --c/w carvedilol, aspirin unless bleeding risk prohibitive, Imdur and statin   --vascular and IC following.    Acute systolic heart failure. S/p dialysis for fluid management  --C/w carvedilol and low-dose Entresto; can restart hydralazine for afterload reduction   --HF consult    PAfib- s/p ablation 9/2021. s/p Watchman procedure 8/2/2024.   --On ASA only.    S/p bioprosthetic AVR/MVR (2020 Dr. Butler).    The patient agreed with the plan. We will continue to follow along peripherally. HF to see 5/28/2025.

## 2025-05-27 NOTE — CHART NOTE - NSCHARTNOTEFT_GEN_A_CORE
Source: Patient and chart review     Current Diet:   Diet, DASH/TLC:   Sodium & Cholesterol Restricted  1500mL Fluid Restriction ([id number]) (05-24-25 @ 11:06) [Active]    Patient tolerating current prescribed diet. Endorses good appetite. Pt dislike vegetables. Denies any nausea, diarrhea or constipation, reports one episode of emesis this morning during dialysis. No chewing or swallowing problems reported at this time.      PO intake:  < 50% [X]       Source for PO intake: flowsheet records     Current Weight:   (5/27) 140 lbs  (5/19) 139 lbs    Pertinent Medications:  MEDICATIONS  (STANDING):  aspirin  chewable 81 milliGRAM(s) Oral daily  atorvastatin 40 milliGRAM(s) Oral at bedtime  calcium acetate 667 milliGRAM(s) Oral three times a day with meals  carvedilol 6.25 milliGRAM(s) Oral every 12 hours  chlorhexidine 2% Cloths 1 Application(s) Topical <User Schedule>  folic acid 1 milliGRAM(s) Oral daily  isosorbide   mononitrate ER Tablet (IMDUR) 60 milliGRAM(s) Oral daily  pantoprazole  Injectable 40 milliGRAM(s) IV Push daily    MEDICATIONS  (PRN):  ondansetron Injectable 4 milliGRAM(s) IV Push every 8 hours PRN Nausea and/or Vomiting    Pertinent Labs:   Hemoglobin : 7.1 g/dL  Hematocrit : 22.3 %  Phosphorus: 6.9mg/dL (H)  Potassium: 4.6 mmol/L   Albumin: 3.2g/dL    Skin: no pressure injuries noted     Estimated Needs:   [X] no change since previous assessment  ~6399-9736 calories/day (based on 30-35 kcals/kg) using BW  ~ grams of protein/day (based on 1.4-1.6 grams/kg)      Hospital course  Per chart "65 y/o M with significant PMH ESRD s/p renal transplant on HD Tu/Th/Sat, type A aortic dissection repair (2010), ischemic bowel resection, CAD s/p CABG x 2 (SVG-OM1and SVG-RPDA), bioprosthetic AVR/MVR (2020 Dr. Butler), Afib s/p ablation (2021),PPM  LAAO device (8/2024), MV endocarditis (2023), remote history of substance abuse and former smoker who presented with c/o shortness of breath. Course complicated by Rt groin hematoma urgently taken to OR for exploration/ evacuation with Hemorrhagic shock requiring  4 prbc, 2 ffp, 1 plt, s/p open cut-down and evacuation of 1L of fresh blood, femoral artery behind profunda vein was ligated and transected, 3 mm arteriotomy w/ active bleeding at common femoral artery bifurcation, repaired with suture and cessation of bleeding. Palpation DP/PT on RLE. Left 15 Zac Zhao drain in groin. Given 5/2/1 prbc/ffp/plt in OR and was left intubated due to concern for respiratory failure and volume overload in setting of blood products resuscitation. Now hemodynamically stable,  rt groin with no bleeding or hematoma dressing intact, Rt groin drain in place with minimal amount of serous drainage. Groin tender to touch, Femoral popliteal, DP/PT pulses palpable, RLE warm and perfusing    Current Nutrition Diagnosis: Increased Nutrient Needs (Protein-energy) related to increased physiologic demand for nutrient as evidenced by ESRD on HD, acute on chronic resp failure secondary to CHF    Recommendations:   1.) Recommend Renal diet, as tolerated.  2.) Recommend adding Nephro-Russel daily.  3.) Monitor diet tolerance & PO intake, weight/hydration status, nutrition-related labs, and skin.     Monitoring and Evaluation:   [X] PO intake  [X] Weights  [X] Follow up per protocol [X] Labs: renal labs

## 2025-05-27 NOTE — PROGRESS NOTE ADULT - ASSESSMENT
65 y/o M w/ PMH of ESRD s/p renal transplant on HD Tu/Th/Sat, type A aortic dissection s/p repair (2010), ischemic bowel resection, CAD s/p CABG x 2 (SVG-OM1and SVG-RPDA), bioprosthetic AVR/MVR (2020 w/ Dr. Butler), AF s/p ablation (2021) and LAAO device (8/2024), MV endocarditis (2023), seizure disorder, BPH, remote hx of substance abuse and former smoker initially presented to [facility] c/o SOB and was admitted 5/19/25 to medicine for new acute CHF exacerbation.  TTE on 5/19/25 was significant for newly reduced LV EF 35-40%.  Pt underwent RHC/LHC on 5/22/25 via RFA.  RHC showed mildly elevated filling pressures and increased CO.  During LHC was unable to pass through aortic root due to prior type A dissection repair.  Hospital course complicated by pt developing Rt groin hematoma on 05/23 noted in AM.  Cardio/cath lab called.  Initially manual pressure applied and fem stop placed but hematoma reoccurred/expanded refractory to fem stop.  Vascular was consulted and stat CTA RLE was ordered which showed a massive hematoma and pt was urgently taken to OR for exploration/ evacuation of  RLE hematoma,  Pt received 1 unit PRBC pre OR but during OR pt went into hemorrhagic shock and MTP was initiated and pt was subsequently given another 4u pRBC, 2u FFP and 1u plt in OR.  Pt s/p open cut-down and evacuation of 1L of fresh blood, femoral artery behind profunda vein was ligated and transected, 3mm arteriotomy w/ active bleeding at common femoral artery bifurcation repaired with suture, bleeding stopped and Rt groin DEDRICK drain placed.  Given pt received significant amount of blood products pre/intra-op decision was made to leave pt intubated for resp failure 2/2 vol overload from CHF but did not require pressor support.  Pt was transferred to MICU.  Pt was successfully extubated early morning of 5/24 and is currently on room air.        #Hemorrhagic shock 2/2 rt groin hematoma s/p LHC VIA RFA   - No pressors required but did undergo MTP intra-op for hemorrhagic shock  - Pt s/p total of 5/2/1 prbc/ffp/plt during hospital course    -  another unit today with hd  - 5/23 s/p open cut-down and evacuation of 1L of fresh blood, femoral artery behind profunda vein was ligated and transected, 3mm arteriotomy w/ active bleeding at common femoral artery bifurcation, repaired with suture and cessation of bleeding  - Pt hemodynamically stable w/ RLE warm, palpable DP/PT noted and dressing in place that has minimal amount of dry blood appreciated   - Rt groin DEDRICK drain in place w/ small amount of blood at this time    - Maintained intubated s/p OR for concern for impending resp failure 2/2 vol overload given amount of blood products transfused in short time frame in setting of rEF   - pt successfully extubated AM of 5/24 and currently satting well ra  - Continue to trend H/h keep type and screen active  - Vascular surgery consult following, asa and hep sub q as per vascular stated on 5/26    #New acute decompensated HFrEF likely 2/2 CAD   - Clinically euvolemic at this time and satting well ORA  - TTE 5/19/25: LVEF 35-40%, mod to severe LVH, normal RV size/function, well seated normal functioning bioprosthetic AV, well seated normal functioning bioprosthetic MV w/ trace intravalvular regurgitation, PASP 83 mmHg (RAP 15 mmHg)   - s/p RCH 5/22 showed mildly elevated filling pressures and increased CO  - s/p LHC 5/22 was attempted via RFA access but unable to pass through aortic root in view of prior type A dissection repair   - s/p CTA 05/23 revealed LM, LAD, RCA and circumflex were all reported to have diffuse significant calcified plaque noted w/ multiple areas of indeterminate stenoses.  Diagonal branches, OM, PDA, posterior branch and ramus intermedius were not well visualized.    - c/w coreg, entresto, and statin   -  po Hydralazine, torsemide, on hold due to hypotension  - Imdur resumed 5/26  - hd as per renal   - Cardio and HF team following     #Chronic HF  - s/p watchman procedure  - Not on AC  - c/w coreg     #HTN / HLD   - Continue holding Hydralazine PO, torsemide,   - Imdura and entresto resumed    #End stage renal disease  - c/w HD as per nephro   - Nephro following     #Seizure disorder  - c/w keppra     #BPH  - c/w flomax    VTE ppx:  hep sub q  Dispo: still acute    needs 2-3 days to trend hgb

## 2025-05-27 NOTE — PROGRESS NOTE ADULT - SUBJECTIVE AND OBJECTIVE BOX
JONATHAN GIBSON    [id number]    64y      Male    INTERVAL HPI/OVERNIGHT EVENTS:  patient being seen for esrd and groin hematoma. Patient seen at bedside and denies any complaints    patient given 1 unit prbc today with hd      REVIEW OF SYSTEMS:    CONSTITUTIONAL: No fever, weight loss, or fatigue  RESPIRATORY: No cough, wheezing, hemoptysis; No shortness of breath  CARDIOVASCULAR: No chest pain, palpitations  GASTROINTESTINAL: No abdominal or epigastric pain. No nausea, vomiting  NEUROLOGICAL: No headaches, memory loss, loss of strength.  MISCELLANEOUS:      Vital Signs Last 24 Hrs  T(C): 36.7 (27 May 2025 12:54), Max: 36.7 (27 May 2025 12:54)  T(F): 98.1 (27 May 2025 12:54), Max: 98.1 (27 May 2025 12:54)  HR: 73 (27 May 2025 12:54) (63 - 84)  BP: 144/73 (27 May 2025 12:54) (115/55 - 165/72)  BP(mean): --  RR: 18 (27 May 2025 12:54) (18 - 18)  SpO2: 98% (27 May 2025 12:54) (94% - 98%)    Parameters below as of 27 May 2025 12:54  Patient On (Oxygen Delivery Method): room air        PHYSICAL EXAM:  GENERAL: pt examined bedside, laying comfortably in bed in NAD  HEENT: NC/AT, moist oral mucosa, pale conjunctiva, sclera nonicteric  RESPIRATORY: Normal respiratory effort; CTA b/l, no wheezing, rhonchi, rales  CARDIOVASCULAR: irregularly irregular, normal S1 and S2, +murmurs  ABDOMEN: soft, NT/ND, normoactive bowel sounds, no rebound/guarding  EXTREMITIES: No cynaosis, no clubbing, no lower extremity edema, extremities warm w/ DP/PT palpable, drain  NEUROLOGY: A+O to person, place, and time, no focal neurologic deficits appreciated   SKIN: pallor, Rt groin hematoma w/ dressing in place that has minimal amount of dry blood appreciated,      LABS:                        7.1    6.05  )-----------( 87       ( 27 May 2025 04:00 )             22.3     05-27    135  |  96  |  56.0[H]  ----------------------------<  84  4.6   |  20.0[L]  |  8.82[H]    Ca    8.4      27 May 2025 04:00  Phos  6.9     05-27  Mg     2.0     05-27    TPro  5.6[L]  /  Alb  3.2[L]  /  TBili  1.0  /  DBili  x   /  AST  10  /  ALT  <5  /  AlkPhos  85  05-27      Urinalysis Basic - ( 27 May 2025 04:00 )    Color: x / Appearance: x / SG: x / pH: x  Gluc: 84 mg/dL / Ketone: x  / Bili: x / Urobili: x   Blood: x / Protein: x / Nitrite: x   Leuk Esterase: x / RBC: x / WBC x   Sq Epi: x / Non Sq Epi: x / Bacteria: x          MEDICATIONS  (STANDING):  aspirin  chewable 81 milliGRAM(s) Oral daily  atorvastatin 40 milliGRAM(s) Oral at bedtime  calcium acetate 667 milliGRAM(s) Oral three times a day with meals  carvedilol 6.25 milliGRAM(s) Oral every 12 hours  chlorhexidine 2% Cloths 1 Application(s) Topical <User Schedule>  epoetin yolanda-epbx (RETACRIT) Injectable 86032 Unit(s) IV Push <User Schedule>  folic acid 1 milliGRAM(s) Oral daily  heparin   Injectable 5000 Unit(s) SubCutaneous every 12 hours  isosorbide   mononitrate ER Tablet (IMDUR) 60 milliGRAM(s) Oral daily  levETIRAcetam 500 milliGRAM(s) Oral two times a day  pantoprazole  Injectable 40 milliGRAM(s) IV Push daily  sacubitril 24 mG/valsartan 26 mG 1 Tablet(s) Oral two times a day  tamsulosin 0.4 milliGRAM(s) Oral at bedtime    MEDICATIONS  (PRN):  acetaminophen     Tablet .. 650 milliGRAM(s) Oral every 6 hours PRN Mild Pain (1 - 3)  hydrALAZINE Injectable 10 milliGRAM(s) IV Push every 3 hours PRN sbp>160  HYDROmorphone  Injectable 0.5 milliGRAM(s) IV Push once PRN dressing  ondansetron Injectable 4 milliGRAM(s) IV Push every 8 hours PRN Nausea and/or Vomiting  oxyCODONE    IR 10 milliGRAM(s) Oral every 4 hours PRN Severe Pain (7 - 10)  oxyCODONE    IR 5 milliGRAM(s) Oral every 4 hours PRN Moderate Pain (4 - 6)  sodium chloride 0.9% lock flush 10 milliLiter(s) IV Push every 1 hour PRN Pre/post blood products, medications, blood draw, and to maintain line patency      RADIOLOGY & ADDITIONAL TESTS:

## 2025-05-28 LAB
ALBUMIN SERPL ELPH-MCNC: 2.9 G/DL — LOW (ref 3.3–5.2)
ALP SERPL-CCNC: 95 U/L — SIGNIFICANT CHANGE UP (ref 40–120)
ALT FLD-CCNC: <5 U/L — SIGNIFICANT CHANGE UP
ANION GAP SERPL CALC-SCNC: 16 MMOL/L — SIGNIFICANT CHANGE UP (ref 5–17)
ANION GAP SERPL CALC-SCNC: 17 MMOL/L — SIGNIFICANT CHANGE UP (ref 5–17)
AST SERPL-CCNC: 25 U/L — SIGNIFICANT CHANGE UP
BILIRUB SERPL-MCNC: 1.4 MG/DL — SIGNIFICANT CHANGE UP (ref 0.4–2)
BUN SERPL-MCNC: 34.2 MG/DL — HIGH (ref 8–20)
BUN SERPL-MCNC: 36.5 MG/DL — HIGH (ref 8–20)
CALCIUM SERPL-MCNC: 8.6 MG/DL — SIGNIFICANT CHANGE UP (ref 8.4–10.5)
CALCIUM SERPL-MCNC: 8.7 MG/DL — SIGNIFICANT CHANGE UP (ref 8.4–10.5)
CHLORIDE SERPL-SCNC: 96 MMOL/L — SIGNIFICANT CHANGE UP (ref 96–108)
CHLORIDE SERPL-SCNC: 98 MMOL/L — SIGNIFICANT CHANGE UP (ref 96–108)
CO2 SERPL-SCNC: 19 MMOL/L — LOW (ref 22–29)
CO2 SERPL-SCNC: 23 MMOL/L — SIGNIFICANT CHANGE UP (ref 22–29)
CREAT SERPL-MCNC: 6.2 MG/DL — HIGH (ref 0.5–1.3)
CREAT SERPL-MCNC: 6.45 MG/DL — HIGH (ref 0.5–1.3)
EGFR: 9 ML/MIN/1.73M2 — LOW
GLUCOSE SERPL-MCNC: 84 MG/DL — SIGNIFICANT CHANGE UP (ref 70–99)
GLUCOSE SERPL-MCNC: 91 MG/DL — SIGNIFICANT CHANGE UP (ref 70–99)
HCT VFR BLD CALC: 25.7 % — LOW (ref 39–50)
HGB BLD-MCNC: 8.2 G/DL — LOW (ref 13–17)
MAGNESIUM SERPL-MCNC: 2 MG/DL — SIGNIFICANT CHANGE UP (ref 1.6–2.6)
MCHC RBC-ENTMCNC: 30.7 PG — SIGNIFICANT CHANGE UP (ref 27–34)
MCHC RBC-ENTMCNC: 31.9 G/DL — LOW (ref 32–36)
MCV RBC AUTO: 96.3 FL — SIGNIFICANT CHANGE UP (ref 80–100)
NRBC # BLD AUTO: 0 K/UL — SIGNIFICANT CHANGE UP (ref 0–0)
NRBC # FLD: 0 K/UL — SIGNIFICANT CHANGE UP (ref 0–0)
NRBC BLD AUTO-RTO: 0 /100 WBCS — SIGNIFICANT CHANGE UP (ref 0–0)
PLATELET # BLD AUTO: 95 K/UL — LOW (ref 150–400)
PMV BLD: 11.6 FL — SIGNIFICANT CHANGE UP (ref 7–13)
POTASSIUM SERPL-MCNC: 4.5 MMOL/L — SIGNIFICANT CHANGE UP (ref 3.5–5.3)
POTASSIUM SERPL-MCNC: 5.5 MMOL/L — HIGH (ref 3.5–5.3)
POTASSIUM SERPL-SCNC: 4.5 MMOL/L — SIGNIFICANT CHANGE UP (ref 3.5–5.3)
POTASSIUM SERPL-SCNC: 5.5 MMOL/L — HIGH (ref 3.5–5.3)
PROT SERPL-MCNC: 6.1 G/DL — LOW (ref 6.6–8.7)
RBC # BLD: 2.67 M/UL — LOW (ref 4.2–5.8)
RBC # FLD: 16.7 % — HIGH (ref 10.3–14.5)
SODIUM SERPL-SCNC: 133 MMOL/L — LOW (ref 135–145)
SODIUM SERPL-SCNC: 136 MMOL/L — SIGNIFICANT CHANGE UP (ref 135–145)
WBC # BLD: 5.65 K/UL — SIGNIFICANT CHANGE UP (ref 3.8–10.5)
WBC # FLD AUTO: 5.65 K/UL — SIGNIFICANT CHANGE UP (ref 3.8–10.5)

## 2025-05-28 PROCEDURE — 99232 SBSQ HOSP IP/OBS MODERATE 35: CPT

## 2025-05-28 PROCEDURE — 99233 SBSQ HOSP IP/OBS HIGH 50: CPT

## 2025-05-28 RX ORDER — SACUBITRIL AND VALSARTAN 49; 51 MG/1; MG/1
1 TABLET, FILM COATED ORAL
Refills: 0 | Status: DISCONTINUED | OUTPATIENT
Start: 2025-05-28 | End: 2025-05-30

## 2025-05-28 RX ORDER — HYDROMORPHONE/SOD CHLOR,ISO/PF 2 MG/10 ML
0.5 SYRINGE (ML) INJECTION EVERY 4 HOURS
Refills: 0 | Status: DISCONTINUED | OUTPATIENT
Start: 2025-05-28 | End: 2025-05-30

## 2025-05-28 RX ADMIN — Medication 0.5 MILLIGRAM(S): at 18:50

## 2025-05-28 RX ADMIN — Medication 650 MILLIGRAM(S): at 01:17

## 2025-05-28 RX ADMIN — CARVEDILOL 6.25 MILLIGRAM(S): 3.12 TABLET, FILM COATED ORAL at 19:52

## 2025-05-28 RX ADMIN — Medication 667 MILLIGRAM(S): at 17:29

## 2025-05-28 RX ADMIN — CARVEDILOL 6.25 MILLIGRAM(S): 3.12 TABLET, FILM COATED ORAL at 08:30

## 2025-05-28 RX ADMIN — OXYCODONE HYDROCHLORIDE 10 MILLIGRAM(S): 30 TABLET ORAL at 08:29

## 2025-05-28 RX ADMIN — Medication 667 MILLIGRAM(S): at 11:56

## 2025-05-28 RX ADMIN — Medication 40 MILLIGRAM(S): at 11:56

## 2025-05-28 RX ADMIN — Medication 0.5 MILLIGRAM(S): at 12:03

## 2025-05-28 RX ADMIN — Medication 1 APPLICATION(S): at 06:11

## 2025-05-28 RX ADMIN — FOLIC ACID 1 MILLIGRAM(S): 1 TABLET ORAL at 11:57

## 2025-05-28 RX ADMIN — HEPARIN SODIUM 5000 UNIT(S): 1000 INJECTION INTRAVENOUS; SUBCUTANEOUS at 06:11

## 2025-05-28 RX ADMIN — OXYCODONE HYDROCHLORIDE 10 MILLIGRAM(S): 30 TABLET ORAL at 09:30

## 2025-05-28 RX ADMIN — Medication 0.5 MILLIGRAM(S): at 18:24

## 2025-05-28 RX ADMIN — ATORVASTATIN CALCIUM 40 MILLIGRAM(S): 80 TABLET, FILM COATED ORAL at 21:44

## 2025-05-28 RX ADMIN — OXYCODONE HYDROCHLORIDE 10 MILLIGRAM(S): 30 TABLET ORAL at 05:19

## 2025-05-28 RX ADMIN — Medication 81 MILLIGRAM(S): at 11:58

## 2025-05-28 RX ADMIN — OXYCODONE HYDROCHLORIDE 10 MILLIGRAM(S): 30 TABLET ORAL at 19:51

## 2025-05-28 RX ADMIN — ISOSORBIDE MONONITRATE 60 MILLIGRAM(S): 60 TABLET, EXTENDED RELEASE ORAL at 11:58

## 2025-05-28 RX ADMIN — Medication 667 MILLIGRAM(S): at 08:30

## 2025-05-28 RX ADMIN — OXYCODONE HYDROCHLORIDE 10 MILLIGRAM(S): 30 TABLET ORAL at 04:10

## 2025-05-28 RX ADMIN — LEVETIRACETAM 500 MILLIGRAM(S): 10 INJECTION, SOLUTION INTRAVENOUS at 19:52

## 2025-05-28 RX ADMIN — TAMSULOSIN HYDROCHLORIDE 0.4 MILLIGRAM(S): 0.4 CAPSULE ORAL at 21:44

## 2025-05-28 RX ADMIN — SACUBITRIL AND VALSARTAN 1 TABLET(S): 49; 51 TABLET, FILM COATED ORAL at 06:10

## 2025-05-28 RX ADMIN — Medication 0.5 MILLIGRAM(S): at 06:09

## 2025-05-28 RX ADMIN — Medication 0.5 MILLIGRAM(S): at 12:39

## 2025-05-28 RX ADMIN — SACUBITRIL AND VALSARTAN 1 TABLET(S): 49; 51 TABLET, FILM COATED ORAL at 17:31

## 2025-05-28 RX ADMIN — OXYCODONE HYDROCHLORIDE 10 MILLIGRAM(S): 30 TABLET ORAL at 00:00

## 2025-05-28 RX ADMIN — LEVETIRACETAM 500 MILLIGRAM(S): 10 INJECTION, SOLUTION INTRAVENOUS at 08:30

## 2025-05-28 NOTE — PROGRESS NOTE ADULT - ASSESSMENT
65 y/o M with significant PMH ESRD s/p renal transplant on HD Tu/Th/Sat, type A aortic dissection repair (2010), ischemic bowel resection, CAD s/p CABG x 2 (SVG-OM1 and SVG-RPDA), bioprosthetic AVR/MVR (2020 Dr. Butler), Afib s/p ablation (2021), LAAO device (8/2024), MV endocarditis (2023), remote history of substance abuse and former smoker who presented with c/o shortness of breath.    TTE this admission revealed newly reduced LV systolic function 35-40%. On 5/22 LHC was attempted but unable to pass through aortic root in view of prior type A dissection repair. RHC showed mildly elevated filling pressures and increased CO (of note, pt has AVF graft). He is s/p CCTA which showed a calcium score of 4754 and significant multivessel CAD with multiple areas of indeterminate stenosis.    On 5/23 he developed a right groin hematoma and was urgently taken to OR for open cut-down and evacuation of 1L of fresh blood with hemorrhagic shock requiring  4 prbc, 2 ffp, 1 plt. The femoral artery behind profunda vein was ligated and transected, 3 mm arteriotomy w/ active bleeding at common femoral artery bifurcation, repaired with suture and cessation of bleeding. Currently with right groin drain in place. His last PRBC was 5/27 during HD.    Cardiac Testing:  TTE 5/19/25: LVEF 35-40%, LVIDD 4.6cm, LVOT VTI 15.9cm, moderate to severe LVH, normal RV size/function, well seated normal functioning bioprosthetic aortic valve, well seated normal functioning bioprosthetic mitral valve with trace intravalvular regurgitation, PASP 83 mmHg (RAP 15 mmHg).     TTE 4/14/25: LVEF 65-70%, LVIDd 4.9cm, LVOT VTI 32.6cm, normal RV size/function, mild JESSICA, bioprosthetic aortic valve with no AS and trace intravalvular regurgitation, bioprosthetic mitral valve with trace intravalvular regurgitation (PG14.9 mmHg, MG 2.77 mmHg), mild to moderate TR, PASP 83 mmHg (RAP 15 mmHg). Aortic root dilated 4.32 cm. No pericardial effusion.     TTE 12/4/24: LVEF 50-55%, LVIDD 4.4cm, LVOT VTI 17cm, moderate LVH, RVE with reduced RV systolic function, bioprosthetic aortic valve w/ trace intravalvular regurgitation (PV 2.24 m/s, PG 20.1 mmHg, MG11.0 mmHg w/ LVOT/aortic valve VTI ratio 0.40. ALEC 1.50 cm² by the continuity equation), bioprosthetic mitral valve with normal function (PG 14.4 mmHg, MG 7.00 mmHg at a heart rate of 103 bpm), moderate TR, PASP 55 mmHg (RAP 15 mmHg). Presence of abdominal ascites incidentally noted. No pericardial effusion.

## 2025-05-28 NOTE — PROGRESS NOTE ADULT - ASSESSMENT
65 yo M with PMH with hx of ESRD on HD, A. fib s/p ablation, LAAO device, CAD s/p CABG x2, bioprosthetic AVR/MR repair, MV endocarditis 2023, type A aortic dissection s/p repair w/ ischemic bowel s/p resection, hx substance abuse, presenting with shortness of breath.  - TTE 5/19/25: LVEF 35-40%, mod to severe LVH, normal RV size/function, well seated normal functioning bioprosthetic AV, well seated normal functioning bioprosthetic MV w/ trace intravalvular regurgitation, PASP 83 mmHg (RAP 15 mmHg)   - s/p RCH 5/22 showed mildly elevated filling pressures and increased CO  - s/p LHC 5/22 was attempted via RFA access but unable to pass through aortic root in view of prior type A dissection repair   - s/p CTA 05/23 revealed LM, LAD, RCA and circumflex were all reported to have diffuse significant calcified plaque noted w/ multiple areas of indeterminate stenoses.  Diagonal branches, OM, PDA, posterior branch and ramus intermedius were not well visualized.   Course complicated by Rt groin hematoma urgently taken to OR for exploration/ evacuation with Hemorrhagic shock requiring  4 prbc, 2 ffp, 1 plt.   5/23/25 S/p open cut-down and evacuation of 1L of fresh blood, femoral artery behind profunda vein was ligated and transected, 3 mm arteriotomy w/ active bleeding at common femoral artery bifurcation, repaired with suture and cessation of bleeding. Recd 1 PRBC  Still has R groin swelling/tender    ESRD: CHF r/o cardiac event  HTN  - HD  on a TTS schedule  - Eventual resumption of Entresto  -    Anemia: +CKD; Tsat 20%; Hb sytabilizing  - cont REINALDO at HD  - Add folate  - may need PRBCs  - Repeat iron stores     RO: serum phos ok  - low phos diet  - Add Phoslo

## 2025-05-28 NOTE — PROGRESS NOTE ADULT - SUBJECTIVE AND OBJECTIVE BOX
JONATHAN GIBSON    [id number]    64y      Male    INTERVAL HPI/OVERNIGHT EVENTS:  patient being seen for esrd and groin hematoma.     overnight patient states he was moving around a lot and feels like the hematoma got bigger.       REVIEW OF SYSTEMS:    CONSTITUTIONAL: pain  RESPIRATORY: No cough, wheezing, hemoptysis; No shortness of breath  CARDIOVASCULAR: No chest pain, palpitations  GASTROINTESTINAL: No abdominal or epigastric pain. No nausea, vomiting  NEUROLOGICAL: No headaches, memory loss, loss of strength.  MISCELLANEOUS:      Vital Signs Last 24 Hrs  T(C): 36.4 (28 May 2025 11:01), Max: 36.7 (27 May 2025 12:54)  T(F): 97.5 (28 May 2025 11:01), Max: 98.1 (27 May 2025 12:54)  HR: 62 (28 May 2025 11:01) (62 - 73)  BP: 156/64 (28 May 2025 11:01) (132/51 - 162/58)  BP(mean): --  RR: 18 (28 May 2025 11:01) (18 - 18)  SpO2: 95% (28 May 2025 11:01) (95% - 98%)    Parameters below as of 27 May 2025 19:30  Patient On (Oxygen Delivery Method): room air        PHYSICAL EXAM:    GENERAL: pt examined bedside, laying comfortably in bed in NAD  HEENT: NC/AT, moist oral mucosa, pale conjunctiva, sclera nonicteric  RESPIRATORY: Normal respiratory effort; CTA b/l, no wheezing, rhonchi, rales  CARDIOVASCULAR: irregularly irregular, normal S1 and S2, +murmurs  ABDOMEN: soft, NT/ND, normoactive bowel sounds, no rebound/guarding  EXTREMITIES: No cynaosis, no clubbing, groin hematoma slightly enlarged  NEUROLOGY: A+O to person, place, and time, no focal neurologic deficits appreciated   SKIN: pallor, Rt groin hematoma w/ dressing    LABS:                        8.2    5.65  )-----------( 95       ( 28 May 2025 07:37 )             25.7     05-28    136  |  96  |  36.5[H]  ----------------------------<  91  4.5   |  23.0  |  6.45[H]    Ca    8.7      28 May 2025 10:34  Phos  6.9     05-27  Mg     2.0     05-28    TPro  6.1[L]  /  Alb  2.9[L]  /  TBili  1.4  /  DBili  x   /  AST  25  /  ALT  <5  /  AlkPhos  95  05-28      Urinalysis Basic - ( 28 May 2025 10:34 )    Color: x / Appearance: x / SG: x / pH: x  Gluc: 91 mg/dL / Ketone: x  / Bili: x / Urobili: x   Blood: x / Protein: x / Nitrite: x   Leuk Esterase: x / RBC: x / WBC x   Sq Epi: x / Non Sq Epi: x / Bacteria: x          MEDICATIONS  (STANDING):  aspirin  chewable 81 milliGRAM(s) Oral daily  atorvastatin 40 milliGRAM(s) Oral at bedtime  calcium acetate 667 milliGRAM(s) Oral three times a day with meals  carvedilol 6.25 milliGRAM(s) Oral every 12 hours  chlorhexidine 2% Cloths 1 Application(s) Topical <User Schedule>  epoetin yolanda-epbx (RETACRIT) Injectable 93436 Unit(s) IV Push <User Schedule>  folic acid 1 milliGRAM(s) Oral daily  isosorbide   mononitrate ER Tablet (IMDUR) 60 milliGRAM(s) Oral daily  levETIRAcetam 500 milliGRAM(s) Oral two times a day  pantoprazole  Injectable 40 milliGRAM(s) IV Push daily  sacubitril 24 mG/valsartan 26 mG 1 Tablet(s) Oral two times a day  tamsulosin 0.4 milliGRAM(s) Oral at bedtime    MEDICATIONS  (PRN):  acetaminophen     Tablet .. 650 milliGRAM(s) Oral every 6 hours PRN Mild Pain (1 - 3)  hydrALAZINE Injectable 10 milliGRAM(s) IV Push every 3 hours PRN sbp>160  morphine  - Injectable 2 milliGRAM(s) IV Push every 6 hours PRN breakthrough pain  ondansetron Injectable 4 milliGRAM(s) IV Push every 8 hours PRN Nausea and/or Vomiting  oxyCODONE    IR 10 milliGRAM(s) Oral every 4 hours PRN Severe Pain (7 - 10)  oxyCODONE    IR 5 milliGRAM(s) Oral every 4 hours PRN Moderate Pain (4 - 6)  sodium chloride 0.9% lock flush 10 milliLiter(s) IV Push every 1 hour PRN Pre/post blood products, medications, blood draw, and to maintain line patency      RADIOLOGY & ADDITIONAL TESTS:

## 2025-05-28 NOTE — PROGRESS NOTE ADULT - SUBJECTIVE AND OBJECTIVE BOX
Patient seen and examined    I&O's Summary    27 May 2025 07:01  -  28 May 2025 07:00  --------------------------------------------------------  IN: 100 mL / OUT: 1450 mL / NET: -1350 mL    28 May 2025 07:01  -  28 May 2025 18:35  --------------------------------------------------------  IN: 0 mL / OUT: 350 mL / NET: -350 mL        REVIEW OF SYSTEMS:    CONSTITUTIONAL: No F/C  RESPIRATORY: No cough,  No SOB  CARDIOVASCULAR: No CP/palpitations,    GASTROINTESTINAL: No abdominal pain  or NVD   GENITOURINARY: No UTI sx  NEUROLOGICAL: No headaches, NO wk/numbness  MUSCULOSKELETAL: R groin swelling - covered ; doesn't want touched  EXTREMITIES : no swelling,    Vital Signs Last 24 Hrs  T(C): 36.9 (28 May 2025 17:29), Max: 36.9 (28 May 2025 17:29)  T(F): 98.5 (28 May 2025 17:29), Max: 98.5 (28 May 2025 17:29)  HR: 63 (28 May 2025 17:29) (62 - 69)  BP: 130/55 (28 May 2025 17:29) (130/55 - 162/58)  BP(mean): --  RR: 18 (28 May 2025 17:29) (18 - 18)  SpO2: 94% (28 May 2025 17:29) (94% - 98%)    Parameters below as of 27 May 2025 19:30  Patient On (Oxygen Delivery Method): room air        PHYSICAL EXAM:    GENERAL: NAD,   EYES:  conjunctiva and sclera clear  NECK: Supple, No JVD/Bruit  NERVOUS SYSTEM:  A/O x3,   CHEST:  No rales or rhonchi  HEART:  RRR, No murmur  ABDOMEN: Soft, NT/ND BS+  EXTREMITIES:  No Edema;  SKIN: No rashes    LABS:                          8.2    5.65  )-----------( 95       ( 28 May 2025 07:37 )             25.7     05-28    136  |  96  |  36.5[H]  ----------------------------<  91  4.5   |  23.0  |  6.45[H]    Ca    8.7      28 May 2025 10:34  Phos  6.9     05-27  Mg     2.0     05-28    TPro  6.1[L]  /  Alb  2.9[L]  /  TBili  1.4  /  DBili  x   /  AST  25  /  ALT  <5  /  AlkPhos  95  05-28      MEDICATIONS  (STANDING):  acetaminophen     Tablet .. PRN  aspirin  chewable  atorvastatin  calcium acetate  carvedilol  chlorhexidine 2% Cloths  epoetin yolanda-epbx (RETACRIT) Injectable  folic acid  hydrALAZINE Injectable PRN  HYDROmorphone  Injectable PRN  isosorbide   mononitrate ER Tablet (IMDUR)  levETIRAcetam  ondansetron Injectable PRN  oxyCODONE    IR PRN  oxyCODONE    IR PRN  pantoprazole  Injectable  sacubitril 49 mG/valsartan 51 mG  sodium chloride 0.9% lock flush PRN  tamsulosin

## 2025-05-28 NOTE — PROGRESS NOTE ADULT - ASSESSMENT
ASSESSMENT:   65 y/o M with significant PMH ESRD s/p renal transplant on HD Tu/Th/Sat, type A aortic dissection repair (2010), ischemic bowel resection, CAD s/p CABG x 2 (SVG-OM1and SVG-RPDA), bioprosthetic AVR/MVR (2020 Dr. Butler), Afib s/p ablation (2021),PPM  LAAO device (8/2024), MV endocarditis (2023), remote history of substance abuse and former smoker who presented with c/o shortness of breath. Course complicated by Rt groin hematoma urgently taken to OR for exploration/ evacuation with Hemorrhagic shock requiring  4 prbc, 2 ffp, 1 plt.   5/23/25 S/p open cut-down and evacuation of 1L of fresh blood, femoral artery behind profunda vein was ligated and transected, 3 mm arteriotomy w/ active bleeding at common femoral artery bifurcation, repaired with suture and cessation of bleeding.  Palpable DP/PT on RLE. Left 15 Zac Zhao drain in groin.   Given 5/2/1 prbc/ffp/plt in OR and was left intubated due to concern for respiratory failure and volume overload in setting of blood products resuscitation   Now hemodynamically stable,  rt groin with swelling, Rt groin drain in place with minimal amount of sanguinous drainage.  Groin tender to touch, Femoral popliteal, DP/PT pulses palpable, RLE warm and perfusing  Last PRBC transfusion 5/27.    PLAN:     -Continue to closely monitor R groin site. Per Vascular Sx, will consider CTA pelvis + RLE if any hemodynamic instability, change in physical exam or H/H drop.  -Continue Coreg, Entresto for HFrEF  -Continue Imdur for ICM  -Continue ASA for CAD  -Interventional Cardiology will continue to follow

## 2025-05-28 NOTE — PROGRESS NOTE ADULT - PROBLEM SELECTOR PLAN 1
LVEF 35-40%, LVIDd 4.6cm, normal RV size/fx  Etiology: Unable to obtain C. S/p CTA which showed multivessel CAD.  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 Device interrogation showed AP 28%.  GDMT: Increase Entresto to 49-51 mg BID. Continue Coreg 6.25 mg BID and Imdur 60 mg daily.  Needs ongoing HTN management.  Aggressive fluid removal via additional HD sessions while admitted.  Low Na, 1.5L FR diet recommended.  Compliance with HD sessions advised.  Multiple readmissions. May consider CardioMEMS however patient would have to be compliant with transmitting daily readings.

## 2025-05-28 NOTE — PROGRESS NOTE ADULT - SUBJECTIVE AND OBJECTIVE BOX
INTERVENTIONAL CARDIOLOGY NURSE PRACTITIONER                                                                                                 PROGRESS NOTE                                                                               Reason for follow up: POST PCI  Overnight: New R groin swelling    65 y/o male PMH ESRD s/p renal transplant on HD Tu/Th/Sat, type A aortic dissection repair (2010), ischemic bowel resection, CAD s/p CABG x 2 (SVG-OM1and SVG-RPDA), bioprosthetic AVR/MVR (2020 Dr. Butler), Afib s/p ablation (2021),PPM  LAAO device (8/2024), MV endocarditis (2023), remote history of substance abuse and former smoker who presented with SOB, underwent R/LHC on 5/22/25 via RFA/RFV revealed Mildly elevated R/L filling pressures wit  increased CO/CI 9.6L/ 5.6L, SAT ; AO 89.2%, SAT; PA 71.7%, PA 52/71/35, PCW 16/28/17, RV 53/0/9, RA 12/12/8, /52/80 Significant tortuosity of ascending aorta in setting of prior surgical type A dissection repair, unable to advance catheters to aortic root, unable to visualize native coronary arteries or grafts.  Mildly elevated R and L sided filling pressures with PA (52/17 (35)), and PCWP (17). Increased CO/CI (9.6/L/min / 5.65L/min/m2), closed by RFA Angioseal.   Course complicated by Rt groin hematoma urgently taken to OR 5/23/25 for exploration/ evacuation with Hemorrhagic shock requiring  4 prbc, 2 ffp, 1 plt. S/p open cut-down and evacuation of 1L of fresh blood, femoral artery behind profunda vein was ligated and transected, 3 mm arteriotomy w/ active bleeding at common femoral artery bifurcation, repaired with suture and cessation of bleeding. Palpable DP/PT on RLE. 15F Zhao drain in groin.   Given 5/2/1 prbc/ffp/plt in OR and was left intubated due to concern for respiratory failure and volume overload in setting of blood products resuscitation   Now hemodynamically stable,  rt groin with swelling, Rt groin drain in place with minimal amount of sanguinous drainage.  Groin tender to touch, Femoral popliteal, DP/PT pulses palpable, RLE warm and perfusing  Last PRBC transfusion 5/27.    Review of symptoms:   Cardiac:  No chest pain. No dyspnea. No palpitations.  Respiratory: No cough. No dyspnea  Gastrointestinal: No diarrhea. No abdominal pain. No bleeding.   	  Vitals:  T(C): 36.4 (05-28-25 @ 11:01), Max: 36.7 (05-27-25 @ 12:54)  HR: 62 (05-28-25 @ 11:01) (62 - 73)  BP: 156/64 (05-28-25 @ 11:01) (132/51 - 162/58)  RR: 18 (05-28-25 @ 11:01) (18 - 18)  SpO2: 95% (05-28-25 @ 11:01) (95% - 98%)  Wt(kg): --    PHYSICAL EXAM:  Appearance: Comfortable. No acute distress  Neurologic: A&Ox 3, no focal deficits.   Cardiovascular: Normal S1 S2, No murmur, rubs/gallops. No JVD, No edema  Respiratory: Lungs clear to auscultation  Gastrointestinal:  Soft, Non-tender, + BS  Lower Extremities: No edema  Psychiatry: Patient is calm. No agitation. Mood & affect appropriate  Site check: RFA site with mild swelling, R groin DEDRICK remains in place with small amount of sanguinous drainage    CURRENT MEDICATIONS:  carvedilol 6.25 milliGRAM(s) Oral every 12 hours  hydrALAZINE Injectable 10 milliGRAM(s) IV Push every 3 hours PRN  isosorbide   mononitrate ER Tablet (IMDUR) 60 milliGRAM(s) Oral daily  sacubitril 24 mG/valsartan 26 mG 1 Tablet(s) Oral two times a day  levETIRAcetam  pantoprazole  Injectable  atorvastatin  aspirin  chewable  calcium acetate  chlorhexidine 2% Cloths  epoetin yolanda-epbx (RETACRIT) Injectable  folic acid  tamsulosin    LABS:	 	                        8.2    5.65  )-----------( 95       ( 28 May 2025 07:37 )             25.7     05-28    136  |  96  |  36.5[H]  ----------------------------<  91  4.5   |  23.0  |  6.45[H]    Ca    8.7      28 May 2025 10:34  Phos  6.9     05-27  Mg     2.0     05-28    TPro  6.1[L]  /  Alb  2.9[L]  /  TBili  1.4  /  DBili  x   /  AST  25  /  ALT  <5  /  AlkPhos  95  05-28

## 2025-05-28 NOTE — PROGRESS NOTE ADULT - ASSESSMENT
64M, here with hypoxic respiratory failure 2/2 CHF, had a R femoral access for a heart cath, c/b a groin hematoma, status post exploration, arterial repair, evacuation of hematoma. Reports concern for enlarging swelling under top portion of right groin incision. Hgb pending this morning, but downtrending.     - Continue to monitor right groin  - May need CTA Pelvis + RLE to eval right groin. Will f/u AM CBC  - Monitor drain output  - Monitor pulse exam, palpable DP on right  - Remainder of care per primary

## 2025-05-28 NOTE — PROGRESS NOTE ADULT - SUBJECTIVE AND OBJECTIVE BOX
HPI/OVERNIGHT EVENTS:  Overnight patient reports that pain at his right groin is worsening and that he feels swelling getting larger. It is tender to light palpation. Dressing changed without active bleeding but some old appearing blood seeping though. Left DP is palpable.       MEDICATIONS  (STANDING):  aspirin  chewable 81 milliGRAM(s) Oral daily  atorvastatin 40 milliGRAM(s) Oral at bedtime  calcium acetate 667 milliGRAM(s) Oral three times a day with meals  carvedilol 6.25 milliGRAM(s) Oral every 12 hours  chlorhexidine 2% Cloths 1 Application(s) Topical <User Schedule>  epoetin yolanda-epbx (RETACRIT) Injectable 18004 Unit(s) IV Push <User Schedule>  folic acid 1 milliGRAM(s) Oral daily  heparin   Injectable 5000 Unit(s) SubCutaneous every 12 hours  isosorbide   mononitrate ER Tablet (IMDUR) 60 milliGRAM(s) Oral daily  levETIRAcetam 500 milliGRAM(s) Oral two times a day  pantoprazole  Injectable 40 milliGRAM(s) IV Push daily  sacubitril 24 mG/valsartan 26 mG 1 Tablet(s) Oral two times a day  tamsulosin 0.4 milliGRAM(s) Oral at bedtime    MEDICATIONS  (PRN):  acetaminophen     Tablet .. 650 milliGRAM(s) Oral every 6 hours PRN Mild Pain (1 - 3)  hydrALAZINE Injectable 10 milliGRAM(s) IV Push every 3 hours PRN sbp>160  ondansetron Injectable 4 milliGRAM(s) IV Push every 8 hours PRN Nausea and/or Vomiting  oxyCODONE    IR 10 milliGRAM(s) Oral every 4 hours PRN Severe Pain (7 - 10)  oxyCODONE    IR 5 milliGRAM(s) Oral every 4 hours PRN Moderate Pain (4 - 6)  sodium chloride 0.9% lock flush 10 milliLiter(s) IV Push every 1 hour PRN Pre/post blood products, medications, blood draw, and to maintain line patency      Vital Signs Last 24 Hrs  T(C): 36.6 (28 May 2025 04:35), Max: 36.7 (27 May 2025 12:54)  T(F): 97.8 (28 May 2025 04:35), Max: 98.1 (27 May 2025 12:54)  HR: 69 (28 May 2025 04:35) (63 - 84)  BP: 145/65 (28 May 2025 04:35) (132/51 - 165/72)  BP(mean): --  RR: 18 (28 May 2025 04:35) (18 - 18)  SpO2: 98% (28 May 2025 04:35) (95% - 98%)    Parameters below as of 27 May 2025 19:30  Patient On (Oxygen Delivery Method): room air        Constitutional: Patient laying comfortably in bed, in no acute distress  HEENT: No active drainage or redness  Neck: Full ROM without pain  Respiratory: Respirations are unlabored, no accessory muscle use, no conversational dyspnea  Cardiovascular: Regular rate  Gastrointestinal: Abdomen soft, non-tender, non-distended  Neurological: No gross sensory / motor / coordination deficits  Extremities: Right groin dressing stained, Drain stripped with serosanguinous fluid in tubing and bulb. Upper portion of incision with swelling underneath, tender. Staples intact otherwise. Right DP palpable.       I&O's Detail    27 May 2025 07:01  -  28 May 2025 07:00  --------------------------------------------------------  IN:    Oral Fluid: 100 mL  Total IN: 100 mL    OUT:    Bulb (mL): 50 mL    Other (mL): 1000 mL    Voided (mL): 400 mL  Total OUT: 1450 mL    Total NET: -1350 mL          LABS:                        7.1    6.05  )-----------( 87       ( 27 May 2025 04:00 )             22.3     05-28    133[L]  |  98  |  34.2[H]  ----------------------------<  84  5.5[H]   |  19.0[L]  |  6.20[H]    Ca    8.6      28 May 2025 04:02  Phos  6.9     05-27  Mg     2.0     05-28    TPro  6.1[L]  /  Alb  2.9[L]  /  TBili  1.4  /  DBili  x   /  AST  25  /  ALT  <5  /  AlkPhos  95  05-28      Urinalysis Basic - ( 28 May 2025 04:02 )    Color: x / Appearance: x / SG: x / pH: x  Gluc: 84 mg/dL / Ketone: x  / Bili: x / Urobili: x   Blood: x / Protein: x / Nitrite: x   Leuk Esterase: x / RBC: x / WBC x   Sq Epi: x / Non Sq Epi: x / Bacteria: x

## 2025-05-28 NOTE — PROGRESS NOTE ADULT - SUBJECTIVE AND OBJECTIVE BOX
[facility] - PROGRESS NOTE  [address] [place] [address]  Office Phone: [phone]/Fax: [phone]  Service/On Call Phone [phone]  _______________________________________________________________________________________________________    Subjective:  - Patient resting comfortably in bed and denies any SOB, CP, palpitations, LH/dizziness.  - He reports increased right groin swelling overnight.    Medications:  acetaminophen     Tablet .. 650 milliGRAM(s) Oral every 6 hours PRN  aspirin  chewable 81 milliGRAM(s) Oral daily  atorvastatin 40 milliGRAM(s) Oral at bedtime  calcium acetate 667 milliGRAM(s) Oral three times a day with meals  carvedilol 6.25 milliGRAM(s) Oral every 12 hours  chlorhexidine 2% Cloths 1 Application(s) Topical <User Schedule>  epoetin yolanda-epbx (RETACRIT) Injectable 13420 Unit(s) IV Push <User Schedule>  folic acid 1 milliGRAM(s) Oral daily  hydrALAZINE Injectable 10 milliGRAM(s) IV Push every 3 hours PRN  HYDROmorphone  Injectable 0.5 milliGRAM(s) IV Push every 4 hours PRN  isosorbide   mononitrate ER Tablet (IMDUR) 60 milliGRAM(s) Oral daily  levETIRAcetam 500 milliGRAM(s) Oral two times a day  ondansetron Injectable 4 milliGRAM(s) IV Push every 8 hours PRN  oxyCODONE    IR 10 milliGRAM(s) Oral every 4 hours PRN  oxyCODONE    IR 5 milliGRAM(s) Oral every 4 hours PRN  pantoprazole  Injectable 40 milliGRAM(s) IV Push daily  sacubitril 49 mG/valsartan 51 mG 1 Tablet(s) Oral two times a day  sodium chloride 0.9% lock flush 10 milliLiter(s) IV Push every 1 hour PRN  tamsulosin 0.4 milliGRAM(s) Oral at bedtime      ICU Vital Signs Last 24 Hrs  T(C): 36.4, Max: 36.7 ( @ 19:30)  HR: 62 (62 - 69)  BP: 156/64 (132/51 - 162/58)  BP(mean): --  ABP: --  ABP(mean): --  RR: 18 (18 - 18)  SpO2: 95% (95% - 98%)    Weight in k.3 (25)  Weight in k (25)      I&O's Summary Last 24 Hrs    IN: 100 mL / OUT: 1450 mL / NET: -1350 mL      Tele: AP 60s    Physical Exam:    General: No distress. Comfortable.  Neck: JVP elevated.  Respiratory: Clear to auscultation bilaterally  CV: RRR. Normal S1 and S2. No murmurs, rub, or gallops. Radial pulses normal.  Abdomen: Soft, non-distended, non-tender  Extremities: Warm, no edema  Neurology: Non-focal, alert and oriented times three.   Psych: Affect normal    Labs:    ( 25 @ 07:37 )               8.2    5.65  )--------( 95                   25.7     ( 25 @ 10:34 )     136  |  96  |  36.5  ---------------------<  91  4.5  |  23.0  |  6.45    Ca 8.7  Phos x   Mg x     ( 25 @ 04:02 )  TPro  6.1  /  Alb  2.9  /  TBili  1.4  /  DBili  x   /  AST  25  /  ALT  <5  /  AlkPhos  95  ( 25 @ 04:00 )  TPro  5.6  /  Alb  3.2  /  TBili  1.0  /  DBili  x   /  AST  10  /  ALT  <5  /  AlkPhos  85    ( 25 @ 14:20 )  TropHS 113   / CK x     / CKMB x      ( 25 @ 12:05 )  TropHS 117   / CK x     / CKMB x

## 2025-05-28 NOTE — PROGRESS NOTE ADULT - ASSESSMENT
65 y/o M w/ PMH of ESRD s/p renal transplant on HD Tu/Th/Sat, type A aortic dissection s/p repair (2010), ischemic bowel resection, CAD s/p CABG x 2 (SVG-OM1and SVG-RPDA), bioprosthetic AVR/MVR (2020 w/ Dr. Butler), AF s/p ablation (2021) and LAAO device (8/2024), MV endocarditis (2023), seizure disorder, BPH, remote hx of substance abuse and former smoker initially presented to [facility] c/o SOB and was admitted 5/19/25 to medicine for new acute CHF exacerbation.  TTE on 5/19/25 was significant for newly reduced LV EF 35-40%.  Pt underwent RHC/LHC on 5/22/25 via RFA.  RHC showed mildly elevated filling pressures and increased CO.  During LHC was unable to pass through aortic root due to prior type A dissection repair.  Hospital course complicated by pt developing Rt groin hematoma on 05/23 noted in AM.  Cardio/cath lab called.  Initially manual pressure applied and fem stop placed but hematoma reoccurred/expanded refractory to fem stop.  Vascular was consulted and stat CTA RLE was ordered which showed a massive hematoma and pt was urgently taken to OR for exploration/ evacuation of  RLE hematoma,  Pt received 1 unit PRBC pre OR but during OR pt went into hemorrhagic shock and MTP was initiated and pt was subsequently given another 4u pRBC, 2u FFP and 1u plt in OR.  Pt s/p open cut-down and evacuation of 1L of fresh blood, femoral artery behind profunda vein was ligated and transected, 3mm arteriotomy w/ active bleeding at common femoral artery bifurcation repaired with suture, bleeding stopped and Rt groin DEDRICK drain placed.  Given pt received significant amount of blood products pre/intra-op decision was made to leave pt intubated for resp failure 2/2 vol overload from CHF but did not require pressor support.  Pt was transferred to MICU.  Pt was successfully extubated early morning of 5/24 and is currently on room air.      #Hemorrhagic shock 2/2 rt groin hematoma s/p LHC VIA RFA   - No pressors required but did undergo MTP intra-op for hemorrhagic shock  - Pt s/p total of 6/2/1 prbc/ffp/plt during hospital course    - 5/23 s/p open cut-down and evacuation of 1L of fresh blood, femoral artery behind profunda vein was ligated and transected, 3mm arteriotomy w/ active bleeding at common femoral artery bifurcation, repaired with suture and cessation of bleeding  - Pt hemodynamically stable w/ RLE warm, palpable DP/PT noted and dressing in place that has minimal amount of dry blood appreciated   - Rt groin DEDRICK drain in place w/ small amount of blood at this time    - Maintained intubated s/p OR for concern for impending resp failure 2/2 vol overload given amount of blood products transfused in short time frame in setting of rEF   - pt successfully extubated AM of 5/24 and currently satting well ra  - Continue to trend H/h keep type and screen active  - Vascular surgery consult following, asa   - stop hep sub q  - will order ct angio w for am , and then HD post ct angio    #New acute decompensated HFrEF likely 2/2 CAD   - Clinically euvolemic at this time and satting well ORA  - TTE 5/19/25: LVEF 35-40%, mod to severe LVH, normal RV size/function, well seated normal functioning bioprosthetic AV, well seated normal functioning bioprosthetic MV w/ trace intravalvular regurgitation, PASP 83 mmHg (RAP 15 mmHg)   - s/p RCH 5/22 showed mildly elevated filling pressures and increased CO  - s/p LHC 5/22 was attempted via RFA access but unable to pass through aortic root in view of prior type A dissection repair   - s/p CTA 05/23 revealed LM, LAD, RCA and circumflex were all reported to have diffuse significant calcified plaque noted w/ multiple areas of indeterminate stenoses.  Diagonal branches, OM, PDA, posterior branch and ramus intermedius were not well visualized.    - c/w coreg, entresto, and statin   -  po Hydralazine, torsemide, on hold due to hypotension  - Imdur resumed 5/26  - hd as per renal   - Cardio and HF team following     #Chronic HF  - s/p watchman procedure  - Not on AC  - c/w coreg     #HTN / HLD   - Continue holding Hydralazine PO, torsemide,   - Imdura and entresto resumed    #End stage renal disease  - c/w HD as per nephro   - Nephro following     #Seizure disorder  - c/w keppra     #BPH  - c/w flomax    VTE ppx:  hep sub q (on hold)  Dispo: still acute

## 2025-05-29 LAB
ALBUMIN SERPL ELPH-MCNC: 3.4 G/DL — SIGNIFICANT CHANGE UP (ref 3.3–5.2)
ALP SERPL-CCNC: 94 U/L — SIGNIFICANT CHANGE UP (ref 40–120)
ALT FLD-CCNC: <5 U/L — SIGNIFICANT CHANGE UP
ANION GAP SERPL CALC-SCNC: 16 MMOL/L — SIGNIFICANT CHANGE UP (ref 5–17)
AST SERPL-CCNC: 12 U/L — SIGNIFICANT CHANGE UP
BASOPHILS # BLD AUTO: 0.03 K/UL — SIGNIFICANT CHANGE UP (ref 0–0.2)
BASOPHILS NFR BLD AUTO: 0.5 % — SIGNIFICANT CHANGE UP (ref 0–2)
BILIRUB SERPL-MCNC: 1.6 MG/DL — SIGNIFICANT CHANGE UP (ref 0.4–2)
BUN SERPL-MCNC: 43.9 MG/DL — HIGH (ref 8–20)
CALCIUM SERPL-MCNC: 8.5 MG/DL — SIGNIFICANT CHANGE UP (ref 8.4–10.5)
CHLORIDE SERPL-SCNC: 98 MMOL/L — SIGNIFICANT CHANGE UP (ref 96–108)
CO2 SERPL-SCNC: 22 MMOL/L — SIGNIFICANT CHANGE UP (ref 22–29)
CREAT SERPL-MCNC: 7.18 MG/DL — HIGH (ref 0.5–1.3)
EGFR: 8 ML/MIN/1.73M2 — LOW
EGFR: 8 ML/MIN/1.73M2 — LOW
EOSINOPHIL # BLD AUTO: 0.51 K/UL — HIGH (ref 0–0.5)
EOSINOPHIL NFR BLD AUTO: 9 % — HIGH (ref 0–6)
GLUCOSE BLDC GLUCOMTR-MCNC: 127 MG/DL — HIGH (ref 70–99)
GLUCOSE SERPL-MCNC: 89 MG/DL — SIGNIFICANT CHANGE UP (ref 70–99)
HCT VFR BLD CALC: 25 % — LOW (ref 39–50)
HCT VFR BLD CALC: 26.9 % — LOW (ref 39–50)
HCT VFR BLD CALC: 27 % — LOW (ref 39–50)
HGB BLD-MCNC: 8.2 G/DL — LOW (ref 13–17)
HGB BLD-MCNC: 8.5 G/DL — LOW (ref 13–17)
HGB BLD-MCNC: 8.6 G/DL — LOW (ref 13–17)
IMM GRANULOCYTES # BLD AUTO: 0.01 K/UL — SIGNIFICANT CHANGE UP (ref 0–0.07)
IMM GRANULOCYTES NFR BLD AUTO: 0.2 % — SIGNIFICANT CHANGE UP (ref 0–0.9)
IMMATURE PLATELET FRACTION #: 4 K/UL — SIGNIFICANT CHANGE UP (ref 3.9–12.5)
IMMATURE PLATELET FRACTION %: 4.4 % — SIGNIFICANT CHANGE UP (ref 1.6–7.1)
LYMPHOCYTES # BLD AUTO: 0.76 K/UL — LOW (ref 1–3.3)
LYMPHOCYTES NFR BLD AUTO: 13.4 % — SIGNIFICANT CHANGE UP (ref 13–44)
MAGNESIUM SERPL-MCNC: 2 MG/DL — SIGNIFICANT CHANGE UP (ref 1.6–2.6)
MCHC RBC-ENTMCNC: 31.1 PG — SIGNIFICANT CHANGE UP (ref 27–34)
MCHC RBC-ENTMCNC: 31.2 PG — SIGNIFICANT CHANGE UP (ref 27–34)
MCHC RBC-ENTMCNC: 31.4 PG — SIGNIFICANT CHANGE UP (ref 27–34)
MCHC RBC-ENTMCNC: 31.6 G/DL — LOW (ref 32–36)
MCHC RBC-ENTMCNC: 31.9 G/DL — LOW (ref 32–36)
MCHC RBC-ENTMCNC: 32.8 G/DL — SIGNIFICANT CHANGE UP (ref 32–36)
MCV RBC AUTO: 95.1 FL — SIGNIFICANT CHANGE UP (ref 80–100)
MCV RBC AUTO: 98.5 FL — SIGNIFICANT CHANGE UP (ref 80–100)
MCV RBC AUTO: 98.5 FL — SIGNIFICANT CHANGE UP (ref 80–100)
MONOCYTES # BLD AUTO: 0.69 K/UL — SIGNIFICANT CHANGE UP (ref 0–0.9)
MONOCYTES NFR BLD AUTO: 12.2 % — SIGNIFICANT CHANGE UP (ref 2–14)
NEUTROPHILS # BLD AUTO: 3.67 K/UL — SIGNIFICANT CHANGE UP (ref 1.8–7.4)
NEUTROPHILS NFR BLD AUTO: 64.7 % — SIGNIFICANT CHANGE UP (ref 43–77)
NRBC # BLD AUTO: 0 K/UL — SIGNIFICANT CHANGE UP (ref 0–0)
NRBC # FLD: 0 K/UL — SIGNIFICANT CHANGE UP (ref 0–0)
NRBC BLD AUTO-RTO: 0 /100 WBCS — SIGNIFICANT CHANGE UP (ref 0–0)
PLATELET # BLD AUTO: 108 K/UL — LOW (ref 150–400)
PLATELET # BLD AUTO: 110 K/UL — LOW (ref 150–400)
PLATELET # BLD AUTO: 90 K/UL — LOW (ref 150–400)
PMV BLD: 10.9 FL — SIGNIFICANT CHANGE UP (ref 7–13)
PMV BLD: 11.1 FL — SIGNIFICANT CHANGE UP (ref 7–13)
PMV BLD: 11.3 FL — SIGNIFICANT CHANGE UP (ref 7–13)
POTASSIUM SERPL-MCNC: 4.4 MMOL/L — SIGNIFICANT CHANGE UP (ref 3.5–5.3)
POTASSIUM SERPL-SCNC: 4.4 MMOL/L — SIGNIFICANT CHANGE UP (ref 3.5–5.3)
PROT SERPL-MCNC: 6 G/DL — LOW (ref 6.6–8.7)
RBC # BLD: 2.63 M/UL — LOW (ref 4.2–5.8)
RBC # BLD: 2.73 M/UL — LOW (ref 4.2–5.8)
RBC # BLD: 2.74 M/UL — LOW (ref 4.2–5.8)
RBC # FLD: 16 % — HIGH (ref 10.3–14.5)
RBC # FLD: 16 % — HIGH (ref 10.3–14.5)
RBC # FLD: 16.2 % — HIGH (ref 10.3–14.5)
SODIUM SERPL-SCNC: 136 MMOL/L — SIGNIFICANT CHANGE UP (ref 135–145)
WBC # BLD: 5.67 K/UL — SIGNIFICANT CHANGE UP (ref 3.8–10.5)
WBC # BLD: 5.83 K/UL — SIGNIFICANT CHANGE UP (ref 3.8–10.5)
WBC # BLD: 5.99 K/UL — SIGNIFICANT CHANGE UP (ref 3.8–10.5)
WBC # FLD AUTO: 5.67 K/UL — SIGNIFICANT CHANGE UP (ref 3.8–10.5)
WBC # FLD AUTO: 5.83 K/UL — SIGNIFICANT CHANGE UP (ref 3.8–10.5)
WBC # FLD AUTO: 5.99 K/UL — SIGNIFICANT CHANGE UP (ref 3.8–10.5)

## 2025-05-29 PROCEDURE — 99233 SBSQ HOSP IP/OBS HIGH 50: CPT

## 2025-05-29 PROCEDURE — 74176 CT ABD & PELVIS W/O CONTRAST: CPT | Mod: 26

## 2025-05-29 PROCEDURE — 99232 SBSQ HOSP IP/OBS MODERATE 35: CPT | Mod: FS

## 2025-05-29 RX ORDER — FERROUS SULFATE 137(45) MG
325 TABLET, EXTENDED RELEASE ORAL DAILY
Refills: 0 | Status: DISCONTINUED | OUTPATIENT
Start: 2025-05-29 | End: 2025-06-03

## 2025-05-29 RX ORDER — IRON SUCROSE 20 MG/ML
100 INJECTION, SOLUTION INTRAVENOUS ONCE
Refills: 0 | Status: COMPLETED | OUTPATIENT
Start: 2025-05-29 | End: 2025-05-29

## 2025-05-29 RX ORDER — ACETAMINOPHEN 500 MG/5ML
1000 LIQUID (ML) ORAL ONCE
Refills: 0 | Status: COMPLETED | OUTPATIENT
Start: 2025-05-29 | End: 2025-05-29

## 2025-05-29 RX ADMIN — Medication 40 MILLIGRAM(S): at 11:03

## 2025-05-29 RX ADMIN — Medication 0.5 MILLIGRAM(S): at 18:09

## 2025-05-29 RX ADMIN — OXYCODONE HYDROCHLORIDE 10 MILLIGRAM(S): 30 TABLET ORAL at 15:56

## 2025-05-29 RX ADMIN — Medication 0.5 MILLIGRAM(S): at 14:18

## 2025-05-29 RX ADMIN — Medication 667 MILLIGRAM(S): at 08:25

## 2025-05-29 RX ADMIN — OXYCODONE HYDROCHLORIDE 10 MILLIGRAM(S): 30 TABLET ORAL at 02:41

## 2025-05-29 RX ADMIN — Medication 4 MILLIGRAM(S): at 21:11

## 2025-05-29 RX ADMIN — Medication 400 MILLIGRAM(S): at 06:32

## 2025-05-29 RX ADMIN — Medication 0.5 MILLIGRAM(S): at 08:25

## 2025-05-29 RX ADMIN — Medication 0.5 MILLIGRAM(S): at 04:15

## 2025-05-29 RX ADMIN — LEVETIRACETAM 500 MILLIGRAM(S): 10 INJECTION, SOLUTION INTRAVENOUS at 21:43

## 2025-05-29 RX ADMIN — OXYCODONE HYDROCHLORIDE 10 MILLIGRAM(S): 30 TABLET ORAL at 11:07

## 2025-05-29 RX ADMIN — IRON SUCROSE 100 MILLIGRAM(S): 20 INJECTION, SOLUTION INTRAVENOUS at 18:14

## 2025-05-29 RX ADMIN — EPOETIN ALFA 10000 UNIT(S): 10000 SOLUTION INTRAVENOUS; SUBCUTANEOUS at 16:13

## 2025-05-29 RX ADMIN — Medication 1 APPLICATION(S): at 05:20

## 2025-05-29 RX ADMIN — TAMSULOSIN HYDROCHLORIDE 0.4 MILLIGRAM(S): 0.4 CAPSULE ORAL at 21:43

## 2025-05-29 RX ADMIN — Medication 325 MILLIGRAM(S): at 11:05

## 2025-05-29 RX ADMIN — Medication 81 MILLIGRAM(S): at 11:07

## 2025-05-29 RX ADMIN — FOLIC ACID 1 MILLIGRAM(S): 1 TABLET ORAL at 11:04

## 2025-05-29 RX ADMIN — ATORVASTATIN CALCIUM 40 MILLIGRAM(S): 80 TABLET, FILM COATED ORAL at 21:43

## 2025-05-29 RX ADMIN — CARVEDILOL 6.25 MILLIGRAM(S): 3.12 TABLET, FILM COATED ORAL at 08:25

## 2025-05-29 RX ADMIN — ISOSORBIDE MONONITRATE 60 MILLIGRAM(S): 60 TABLET, EXTENDED RELEASE ORAL at 11:07

## 2025-05-29 RX ADMIN — Medication 0.5 MILLIGRAM(S): at 09:00

## 2025-05-29 RX ADMIN — Medication 667 MILLIGRAM(S): at 11:09

## 2025-05-29 RX ADMIN — SACUBITRIL AND VALSARTAN 1 TABLET(S): 49; 51 TABLET, FILM COATED ORAL at 05:20

## 2025-05-29 RX ADMIN — LEVETIRACETAM 500 MILLIGRAM(S): 10 INJECTION, SOLUTION INTRAVENOUS at 08:25

## 2025-05-29 RX ADMIN — Medication 500 MILLILITER(S): at 21:42

## 2025-05-29 NOTE — PROGRESS NOTE ADULT - NS_MD_PANP_GEN_ALL_CORE

## 2025-05-29 NOTE — PROGRESS NOTE ADULT - ASSESSMENT
63 yo M with PMH with hx of ESRD on HD, A. fib s/p ablation, LAAO device, CAD s/p CABG x2, bioprosthetic AVR/MR repair, MV endocarditis 2023, type A aortic dissection s/p repair w/ ischemic bowel s/p resection, hx substance abuse, presenting with shortness of breath.  - TTE 5/19/25: LVEF 35-40%, mod to severe LVH, normal RV size/function, well seated normal functioning bioprosthetic AV, well seated normal functioning bioprosthetic MV w/ trace intravalvular regurgitation, PASP 83 mmHg (RAP 15 mmHg)   - s/p RCH 5/22 showed mildly elevated filling pressures and increased CO  - s/p LHC 5/22 was attempted via RFA access but unable to pass through aortic root in view of prior type A dissection repair   - s/p CTA 05/23 revealed LM, LAD, RCA and circumflex were all reported to have diffuse significant calcified plaque noted w/ multiple areas of indeterminate stenoses.  Diagonal branches, OM, PDA, posterior branch and ramus intermedius were not well visualized.   Course complicated by Rt groin hematoma urgently taken to OR for exploration/ evacuation with Hemorrhagic shock requiring  4 prbc, 2 ffp, 1 plt.   5/23/25 S/p open cut-down and evacuation of 1L of fresh blood, femoral artery behind profunda vein was ligated and transected, 3 mm arteriotomy w/ active bleeding at common femoral artery bifurcation, repaired with suture and cessation of bleeding. Recd 1 PRBC  Still has R groin swelling/tender, mild fresh bleeding noted after dressing change    ESRD: - HD  on a TTS schedule - waiting    CHF r/o cardiac event  -HTN  - Eventual resumption of Entresto  -    Anemia: +CKD; Tsat 20%; Hb stabilizing  - cont REINALDO at HD  - Add folate  - may need PRBCs  - Repeat iron stores     RO: serum phos ok  - low phos diet  - Add Phoslo

## 2025-05-29 NOTE — CHART NOTE - NSCHARTNOTEFT_GEN_A_CORE
asked by hd rn to assess groin    appears slightly larger than earlier    1) ct abd non con ordered  2) cbc stat  3) pain meds    patient is refusing pressure dressing due to pain

## 2025-05-29 NOTE — PROGRESS NOTE ADULT - ASSESSMENT
ASSESSMENT:   63 y/o M with significant PMH ESRD s/p renal transplant on HD Tu/Th/Sat, type A aortic dissection repair (2010), ischemic bowel resection, CAD s/p CABG x 2 (SVG-OM1and SVG-RPDA), bioprosthetic AVR/MVR (2020 Dr. Butler), Afib s/p ablation (2021),PPM  LAAO device (8/2024), MV endocarditis (2023), remote history of substance abuse and former smoker who presented with c/o shortness of breath. Course complicated by Rt groin hematoma urgently taken to OR for exploration/ evacuation with Hemorrhagic shock requiring  4 prbc, 2 ffp, 1 plt.   5/23/25 S/p open cut-down and evacuation of 1L of fresh blood, femoral artery behind profunda vein was ligated and transected, 3 mm arteriotomy w/ active bleeding at common femoral artery bifurcation, repaired with suture and cessation of bleeding.  Palpable DP/PT on RLE. Left 15 Zac Zhao drain in groin.   Given 5/2/1 prbc/ffp/plt in OR and was left intubated due to concern for respiratory failure and volume overload in setting of blood products resuscitation   Now hemodynamically stable,  rt groin with swelling, Rt groin drain in place with minimal amount of sanguinous drainage.  Groin tender to touch, Femoral popliteal, DP/PT pulses palpable, RLE warm and perfusing  Last PRBC transfusion 5/27. H/H stable. Will undergo HD today.    PLAN:     -Continue to closely monitor R groin site. Per Vascular Sx, will consider CTA pelvis + RLE if any hemodynamic instability, change in physical exam or H/H drop.  -Continue Coreg, Entresto for HFrEF  -Continue Imdur for ICM  -Continue ASA for CAD  -Interventional Cardiology will continue to follow ASSESSMENT:   65 y/o M with significant PMH ESRD s/p renal transplant on HD Tu/Th/Sat, type A aortic dissection repair (2010), ischemic bowel resection, CAD s/p CABG x 2 (SVG-OM1and SVG-RPDA), bioprosthetic AVR/MVR (2020 Dr. Butler), Afib s/p ablation (2021),PPM  LAAO device (8/2024), MV endocarditis (2023), remote history of substance abuse and former smoker who presented with c/o shortness of breath. Course complicated by Rt groin hematoma urgently taken to OR for exploration/ evacuation with Hemorrhagic shock requiring  4 prbc, 2 ffp, 1 plt.   5/23/25 S/p open cut-down and evacuation of 1L of fresh blood, femoral artery behind profunda vein was ligated and transected, 3 mm arteriotomy w/ active bleeding at common femoral artery bifurcation, repaired with suture and cessation of bleeding.  Palpable DP/PT on RLE. Left 15 Zac Zhao drain in groin.   Given 5/2/1 prbc/ffp/plt in OR and was left intubated due to concern for respiratory failure and volume overload in setting of blood products resuscitation   Now hemodynamically stable,  rt groin with swelling, Rt groin drain in place with minimal amount of sanguinous drainage.  Groin tender to touch, Femoral popliteal, DP/PT pulses palpable, RLE warm and perfusing  Last PRBC transfusion 5/27. H/H stable. Will undergo HD today.    PLAN:     -Continue to closely monitor R groin site. Per Vascular Sx, will consider CTA pelvis + RLE if any hemodynamic instability, change in physical exam or H/H drop.  -Continue Coreg, Entresto for HFrEF  -Continue Imdur for ICM  -Continue ASA for CAD  -Interventional Cardiology will continue to follow    Case discussed with Dr. Gan.

## 2025-05-29 NOTE — PROGRESS NOTE ADULT - SUBJECTIVE AND OBJECTIVE BOX
Patient seen and examined    Feels fine  no c/o CP SOB NV   No swelling feet    Vital Signs Last 24 Hrs  T(C): 36.6 (29 May 2025 10:36), Max: 36.9 (28 May 2025 17:29)  T(F): 97.9 (29 May 2025 10:36), Max: 98.5 (28 May 2025 17:29)  HR: 61 (29 May 2025 10:36) (61 - 64)  BP: 134/54 (29 May 2025 10:36) (107/60 - 157/63)  BP(mean): --  RR: 18 (29 May 2025 10:36) (18 - 19)  SpO2: 98% (29 May 2025 10:36) (94% - 98%)    Parameters below as of 29 May 2025 04:00  Patient On (Oxygen Delivery Method): room air        PHYSICAL EXAM    GENERAL: NAD,   EYES:  conjunctiva and sclera clear  NECK: Supple, No JVD/Bruit  NERVOUS SYSTEM:  A/O x3,   CHEST:  No rales, No rhonchi  HEART:  RRR, No murmur  ABDOMEN: Soft, NT/ND BS+  EXTREMITIES:  No Edema;  SKIN: No rashes    29 May 2025 03:55    136    |  98     |  43.9   ----------------------------<  89     4.4     |  22.0   |  7.18     Ca    8.5        29 May 2025 03:55  Mg     2.0       29 May 2025 03:55    TPro  6.0    /  Alb  3.4    /  TBili  1.6    /  DBili  x      /  AST  12     /  ALT  <5     /  AlkPhos  94     29 May 2025 03:55                          8.5    5.67  )-----------( 108      ( 29 May 2025 03:55 )             26.9         Continue present treatment

## 2025-05-29 NOTE — PROGRESS NOTE ADULT - ASSESSMENT
64M, here with hypoxic respiratory failure 2/2 CHF, had a R femoral access for a heart cath, c/b a groin hematoma, status post exploration, arterial repair, evacuation of hematoma. Reports concern for enlarging swelling under top portion of right groin incision. Hgb and clinical exam has been stable.     - Continue to monitor right groin  - CTA RLE to eval right groin ordered per primary, will f/u  - Monitor drain output  - Monitor pulse exam, palpable DP on right  - Remainder of care per primary   64M, here with hypoxic respiratory failure 2/2 CHF, had a R femoral access for a heart cath, c/b a groin hematoma, status post exploration, arterial repair, evacuation of hematoma. Reports concern for enlarging swelling under top portion of right groin incision. Hgb and clinical exam has been stable.     - Continue to monitor right groin.   - CTA RLE to eval right groin ordered per primary. Clarified with primary that Hgb has been stable and concern is low. Agreement to cancel study.  - Monitor drain output  - Monitor pulse exam, palpable DP on right  - Remainder of care per primary

## 2025-05-29 NOTE — PROGRESS NOTE ADULT - SUBJECTIVE AND OBJECTIVE BOX
HPI/OVERNIGHT EVENTS:  No acute overnight events. Vital signs stable. Seen sleeping during AM rounds, awoken without issue. Reports persisting pain at right groin about the same as yesterday. Groin dressing stained at inferior portion, DEDRICK with serosanguinous fluid 20cc in 24 hrs.       MEDICATIONS  (STANDING):  aspirin  chewable 81 milliGRAM(s) Oral daily  atorvastatin 40 milliGRAM(s) Oral at bedtime  calcium acetate 667 milliGRAM(s) Oral three times a day with meals  carvedilol 6.25 milliGRAM(s) Oral every 12 hours  chlorhexidine 2% Cloths 1 Application(s) Topical <User Schedule>  epoetin yolanda-epbx (RETACRIT) Injectable 67750 Unit(s) IV Push <User Schedule>  ferrous    sulfate 325 milliGRAM(s) Oral daily  folic acid 1 milliGRAM(s) Oral daily  isosorbide   mononitrate ER Tablet (IMDUR) 60 milliGRAM(s) Oral daily  levETIRAcetam 500 milliGRAM(s) Oral two times a day  pantoprazole  Injectable 40 milliGRAM(s) IV Push daily  sacubitril 49 mG/valsartan 51 mG 1 Tablet(s) Oral two times a day  tamsulosin 0.4 milliGRAM(s) Oral at bedtime    MEDICATIONS  (PRN):  acetaminophen     Tablet .. 650 milliGRAM(s) Oral every 6 hours PRN Mild Pain (1 - 3)  hydrALAZINE Injectable 10 milliGRAM(s) IV Push every 3 hours PRN sbp>160  HYDROmorphone  Injectable 0.5 milliGRAM(s) IV Push every 4 hours PRN Severe Pain (7 - 10)  ondansetron Injectable 4 milliGRAM(s) IV Push every 8 hours PRN Nausea and/or Vomiting  oxyCODONE    IR 10 milliGRAM(s) Oral every 4 hours PRN Severe Pain (7 - 10)  oxyCODONE    IR 5 milliGRAM(s) Oral every 4 hours PRN Moderate Pain (4 - 6)  sodium chloride 0.9% lock flush 10 milliLiter(s) IV Push every 1 hour PRN Pre/post blood products, medications, blood draw, and to maintain line patency      Vital Signs Last 24 Hrs  T(C): 36.5 (29 May 2025 04:14), Max: 36.9 (28 May 2025 17:29)  T(F): 97.7 (29 May 2025 04:14), Max: 98.5 (28 May 2025 17:29)  HR: 62 (29 May 2025 04:14) (62 - 64)  BP: 142/55 (29 May 2025 04:14) (107/60 - 160/58)  BP(mean): --  RR: 18 (29 May 2025 04:14) (18 - 19)  SpO2: 95% (29 May 2025 04:14) (94% - 97%)    Parameters below as of 29 May 2025 04:00  Patient On (Oxygen Delivery Method): room air        Constitutional: Patient laying comfortably in bed, in no acute distress  HEENT: No active drainage or redness  Neck: Full ROM without pain  Respiratory: Respirations are unlabored, no accessory muscle use, no conversational dyspnea  Cardiovascular: Regular rate  Gastrointestinal: Abdomen soft, non-distended  Neurological: No gross sensory / motor / coordination deficits  Extremities: R groin with swelling at superior part of incision, stable appearing and tender, without skin changes or active bleeding. DEDRICK drain with serosanguinous fluid       I&O's Detail    28 May 2025 07:01  -  29 May 2025 07:00  --------------------------------------------------------  IN:    Oral Fluid: 420 mL  Total IN: 420 mL    OUT:    Bulb (mL): 20 mL    Voided (mL): 350 mL  Total OUT: 370 mL    Total NET: 50 mL          LABS:                        8.5    5.67  )-----------( 108      ( 29 May 2025 03:55 )             26.9     05-29    136  |  98  |  43.9[H]  ----------------------------<  89  4.4   |  22.0  |  7.18[H]    Ca    8.5      29 May 2025 03:55  Mg     2.0     05-29    TPro  6.0[L]  /  Alb  3.4  /  TBili  1.6  /  DBili  x   /  AST  12  /  ALT  <5  /  AlkPhos  94  05-29      Urinalysis Basic - ( 29 May 2025 03:55 )    Color: x / Appearance: x / SG: x / pH: x  Gluc: 89 mg/dL / Ketone: x  / Bili: x / Urobili: x   Blood: x / Protein: x / Nitrite: x   Leuk Esterase: x / RBC: x / WBC x   Sq Epi: x / Non Sq Epi: x / Bacteria: x

## 2025-05-29 NOTE — PROVIDER CONTACT NOTE (OTHER) - BACKGROUND
to that. Patient received 500 ml bolus as well as Zofran.
64M adm 5/19 with CHF exac. & resp failure s/p LRHC 5/22 via R groin, hematoma noted earlier around 11 resolved with direct manual pressure & fem stop application, now since returned and expanding

## 2025-05-29 NOTE — PROGRESS NOTE ADULT - SUBJECTIVE AND OBJECTIVE BOX
JONATHAN GIBSON    [id number]    64y      Male    INTERVAL HPI/OVERNIGHT EVENTS:  patient being seen for groin hematoma and esrd. patient seen at bedside and denies any complaints    REVIEW OF SYSTEMS:    CONSTITUTIONAL: No fever, weight loss, or fatigue  RESPIRATORY: No cough, wheezing, hemoptysis; No shortness of breath  CARDIOVASCULAR: No chest pain, palpitations  GASTROINTESTINAL: No abdominal or epigastric pain. No nausea, vomiting  NEUROLOGICAL: No headaches, memory loss, loss of strength.  MISCELLANEOUS:      Vital Signs Last 24 Hrs  T(C): 36.6 (29 May 2025 10:36), Max: 36.9 (28 May 2025 17:29)  T(F): 97.9 (29 May 2025 10:36), Max: 98.5 (28 May 2025 17:29)  HR: 61 (29 May 2025 10:36) (61 - 64)  BP: 134/54 (29 May 2025 10:36) (107/60 - 157/63)  BP(mean): --  RR: 18 (29 May 2025 10:36) (18 - 19)  SpO2: 98% (29 May 2025 10:36) (94% - 98%)    Parameters below as of 29 May 2025 04:00  Patient On (Oxygen Delivery Method): room air        PHYSICAL EXAM:    GENERAL: pt examined bedside, laying comfortably in bed in NAD  HEENT: NC/AT, moist oral mucosa, pale conjunctiva, sclera nonicteric  RESPIRATORY: Normal respiratory effort; CTA b/l, no wheezing, rhonchi, rales  CARDIOVASCULAR: irregularly irregular, normal S1 and S2, +murmurs  ABDOMEN: soft, NT/ND, normoactive bowel sounds, no rebound/guarding  EXTREMITIES: No cynaosis, no clubbing, groin hematoma slightly enlarged  NEUROLOGY: A+O to person, place, and time, no focal neurologic deficits appreciated   SKIN: pallor, Rt groin hematoma w/ dressing      LABS:                        8.5    5.67  )-----------( 108      ( 29 May 2025 03:55 )             26.9     05-29    136  |  98  |  43.9[H]  ----------------------------<  89  4.4   |  22.0  |  7.18[H]    Ca    8.5      29 May 2025 03:55  Mg     2.0     05-29    TPro  6.0[L]  /  Alb  3.4  /  TBili  1.6  /  DBili  x   /  AST  12  /  ALT  <5  /  AlkPhos  94  05-29      Urinalysis Basic - ( 29 May 2025 03:55 )    Color: x / Appearance: x / SG: x / pH: x  Gluc: 89 mg/dL / Ketone: x  / Bili: x / Urobili: x   Blood: x / Protein: x / Nitrite: x   Leuk Esterase: x / RBC: x / WBC x   Sq Epi: x / Non Sq Epi: x / Bacteria: x          MEDICATIONS  (STANDING):  aspirin  chewable 81 milliGRAM(s) Oral daily  atorvastatin 40 milliGRAM(s) Oral at bedtime  calcium acetate 667 milliGRAM(s) Oral three times a day with meals  carvedilol 6.25 milliGRAM(s) Oral every 12 hours  chlorhexidine 2% Cloths 1 Application(s) Topical <User Schedule>  epoetin yolanda-epbx (RETACRIT) Injectable 74453 Unit(s) IV Push <User Schedule>  ferrous    sulfate 325 milliGRAM(s) Oral daily  folic acid 1 milliGRAM(s) Oral daily  isosorbide   mononitrate ER Tablet (IMDUR) 60 milliGRAM(s) Oral daily  levETIRAcetam 500 milliGRAM(s) Oral two times a day  pantoprazole  Injectable 40 milliGRAM(s) IV Push daily  sacubitril 49 mG/valsartan 51 mG 1 Tablet(s) Oral two times a day  tamsulosin 0.4 milliGRAM(s) Oral at bedtime    MEDICATIONS  (PRN):  acetaminophen     Tablet .. 650 milliGRAM(s) Oral every 6 hours PRN Mild Pain (1 - 3)  hydrALAZINE Injectable 10 milliGRAM(s) IV Push every 3 hours PRN sbp>160  HYDROmorphone  Injectable 0.5 milliGRAM(s) IV Push every 4 hours PRN Severe Pain (7 - 10)  ondansetron Injectable 4 milliGRAM(s) IV Push every 8 hours PRN Nausea and/or Vomiting  oxyCODONE    IR 10 milliGRAM(s) Oral every 4 hours PRN Severe Pain (7 - 10)  oxyCODONE    IR 5 milliGRAM(s) Oral every 4 hours PRN Moderate Pain (4 - 6)  sodium chloride 0.9% lock flush 10 milliLiter(s) IV Push every 1 hour PRN Pre/post blood products, medications, blood draw, and to maintain line patency      RADIOLOGY & ADDITIONAL TESTS:

## 2025-05-29 NOTE — PROGRESS NOTE ADULT - ASSESSMENT
65 y/o M w/ PMH of ESRD s/p renal transplant on HD Tu/Th/Sat, type A aortic dissection s/p repair (2010), ischemic bowel resection, CAD s/p CABG x 2 (SVG-OM1and SVG-RPDA), bioprosthetic AVR/MVR (2020 w/ Dr. Butler), AF s/p ablation (2021) and LAAO device (8/2024), MV endocarditis (2023), seizure disorder, BPH, remote hx of substance abuse and former smoker initially presented to [facility] c/o SOB and was admitted 5/19/25 to medicine for new acute CHF exacerbation.  TTE on 5/19/25 was significant for newly reduced LV EF 35-40%.  Pt underwent RHC/LHC on 5/22/25 via RFA.  RHC showed mildly elevated filling pressures and increased CO.  During LHC was unable to pass through aortic root due to prior type A dissection repair.  Hospital course complicated by pt developing Rt groin hematoma on 05/23 noted in AM.  Cardio/cath lab called.  Initially manual pressure applied and fem stop placed but hematoma reoccurred/expanded refractory to fem stop.  Vascular was consulted and stat CTA RLE was ordered which showed a massive hematoma and pt was urgently taken to OR for exploration/ evacuation of  RLE hematoma,  Pt received 1 unit PRBC pre OR but during OR pt went into hemorrhagic shock and MTP was initiated and pt was subsequently given another 4u pRBC, 2u FFP and 1u plt in OR.  Pt s/p open cut-down and evacuation of 1L of fresh blood, femoral artery behind profunda vein was ligated and transected, 3mm arteriotomy w/ active bleeding at common femoral artery bifurcation repaired with suture, bleeding stopped and Rt groin DEDRICK drain placed.  Given pt received significant amount of blood products pre/intra-op decision was made to leave pt intubated for resp failure 2/2 vol overload from CHF but did not require pressor support.  dowmngraded from micu    #Hemorrhagic shock 2/2 rt groin hematoma s/p LHC VIA RFA   - No pressors required but did undergo MTP intra-op for hemorrhagic shock  - Pt s/p total of 6/2/1 prbc/ffp/plt during hospital course    - 5/23 s/p open cut-down and evacuation of 1L of fresh blood, femoral artery behind profunda vein was ligated and transected, 3mm arteriotomy w/ active bleeding at common femoral artery bifurcation, repaired with suture and cessation of bleeding  - Pt hemodynamically stable w/ RLE warm, palpable DP/PT noted and dressing in place that has minimal amount of dry blood appreciated   - Rt groin DEDRICK drain in place w/ small amount of blood at this time    - Maintained intubated s/p OR for concern for impending resp failure 2/2 vol overload given amount of blood products transfused in short time frame in setting of rEF   - pt successfully extubated AM of 5/24 and currently satting well ra  - Continue to trend H/h keep type and screen active  - Vascular surgery consult following, asa   - stop hep sub q  - ct angio cancelled  - hgb improved    #acute decompensated HFrEF likely 2/2 CAD   - Clinically euvolemic at this time and satting well ORA  - TTE 5/19/25: LVEF 35-40%, mod to severe LVH, normal RV size/function, well seated normal functioning bioprosthetic AV, well seated normal functioning bioprosthetic MV w/ trace intravalvular regurgitation, PASP 83 mmHg (RAP 15 mmHg)   - s/p RCH 5/22 showed mildly elevated filling pressures and increased CO  - s/p LHC 5/22 was attempted via RFA access but unable to pass through aortic root in view of prior type A dissection repair   - s/p CTA 05/23 revealed LM, LAD, RCA and circumflex were all reported to have diffuse significant calcified plaque noted w/ multiple areas of indeterminate stenoses.  Diagonal branches, OM, PDA, posterior branch and ramus intermedius were not well visualized.    - c/w coreg, entresto, and statin   -  po Hydralazine, torsemide, on hold due to hypotension  - Imdur resumed 5/26  - hd today as per renal     #Chronic HF  - s/p watchman procedure  - Not on AC  - c/w coreg     #HTN / HLD   - Continue holding Hydralazine PO, torsemide,   - Imdura and entresto resumed    #End stage renal disease  - c/w HD as per nephro   - Nephro following     #Seizure disorder  - c/w keppra     #BPH  - c/w flomax    VTE ppx:  hep sub q (on hold)  Dispo: still acute

## 2025-05-29 NOTE — PROGRESS NOTE ADULT - NS ATTEND OPT1A GEN_ALL_CORE
Medical decision making
History/Exam/Medical decision making

## 2025-05-29 NOTE — PROGRESS NOTE ADULT - SUBJECTIVE AND OBJECTIVE BOX
INTERVENTIONAL CARDIOLOGY NURSE PRACTITIONER                                                                                                 PROGRESS NOTE                                                                               Reason for follow up: Post-LHC requiring post-op RFA repair with vascular surgery  Overnight: Right groin swelling stable from yesterday, 5/28/25    65 y/o male PMH ESRD s/p renal transplant on HD Tu/Th/Sat, type A aortic dissection repair (2010), ischemic bowel resection, CAD s/p CABG x 2 (SVG-OM1and SVG-RPDA), bioprosthetic AVR/MVR (2020 Dr. Butler), Afib s/p ablation (2021),PPM  LAAO device (8/2024), MV endocarditis (2023), remote history of substance abuse and former smoker who presented with SOB, underwent R/LHC on 5/22/25 via RFA/RFV revealed Mildly elevated R/L filling pressures wit  increased CO/CI 9.6L/ 5.6L, SAT ; AO 89.2%, SAT; PA 71.7%, PA 52/71/35, PCW 16/28/17, RV 53/0/9, RA 12/12/8, /52/80 Significant tortuosity of ascending aorta in setting of prior surgical type A dissection repair, unable to advance catheters to aortic root, unable to visualize native coronary arteries or grafts.  Mildly elevated R and L sided filling pressures with PA (52/17 (35)), and PCWP (17). Increased CO/CI (9.6/L/min / 5.65L/min/m2), closed by RFA Angioseal.   Course complicated by Rt groin hematoma urgently taken to OR 5/23/25 for exploration/ evacuation with Hemorrhagic shock requiring  4 prbc, 2 ffp, 1 plt. S/p open cut-down and evacuation of 1L of fresh blood, femoral artery behind profunda vein was ligated and transected, 3 mm arteriotomy w/ active bleeding at common femoral artery bifurcation, repaired with suture and cessation of bleeding. Palpable DP/PT on RLE. 15F Zhao drain in groin.   Given 5/2/1 prbc/ffp/plt in OR and was left intubated due to concern for respiratory failure and volume overload in setting of blood products resuscitation   Now hemodynamically stable,  rt groin with swelling, Rt groin drain in place with minimal amount of sanguinous drainage.  Groin tender to touch, Femoral popliteal, DP/PT pulses palpable, RLE warm and perfusing  Last PRBC transfusion 5/27. Patient denies current complaints except groin tenderness.    Review of symptoms:   Negative unless mentioned in HPI  	  Vital Signs Last 24 Hrs  T(C): 36.6 (29 May 2025 10:36), Max: 36.9 (28 May 2025 17:29)  T(F): 97.9 (29 May 2025 10:36), Max: 98.5 (28 May 2025 17:29)  HR: 61 (29 May 2025 10:36) (61 - 64)  BP: 134/54 (29 May 2025 10:36) (107/60 - 157/63)  RR: 18 (29 May 2025 10:36) (18 - 19)  SpO2: 98% (29 May 2025 10:36) (94% - 98%)    Parameters below as of 29 May 2025 04:00  Patient On (Oxygen Delivery Method): room air    PHYSICAL EXAM:  Appearance: Comfortable. No acute distress  Neurologic: A&Ox 3, no focal deficits.   Cardiovascular: Normal S1 S2, No murmur, rubs/gallops. No JVD, No edema  Respiratory: Lungs clear to auscultation  Gastrointestinal:  Soft, Non-tender, + BS  Lower Extremities: No edema  Psychiatry: Patient is calm. No agitation. Mood & affect appropriate  Site check: RFA site with mild swelling, R groin DEDRICK remains in place with small amount of sanguinous drainage    CURRENT MEDICATIONS:  carvedilol 6.25 milliGRAM(s) Oral every 12 hours  hydrALAZINE Injectable 10 milliGRAM(s) IV Push every 3 hours PRN  isosorbide   mononitrate ER Tablet (IMDUR) 60 milliGRAM(s) Oral daily  sacubitril 24 mG/valsartan 26 mG 1 Tablet(s) Oral two times a day  levETIRAcetam  pantoprazole  Injectable  atorvastatin  aspirin  chewable  calcium acetate  chlorhexidine 2% Cloths  epoetin yolanda-epbx (RETACRIT) Injectable  folic acid  tamsulosin    LABS:	 	                        8.5    5.67  )-----------( 108      ( 29 May 2025 03:55 )             26.9     136  |  98  |  43.9[H]  ----------------------------<  89  4.4   |  22.0  |  7.18[H]    Ca    8.5      29 May 2025 03:55  Mg     2.0     05-29    TPro  6.0[L]  /  Alb  3.4  /  TBili  1.6  /  DBili  x   /  AST  12  /  ALT  <5  /  AlkPhos  94  05-29

## 2025-05-29 NOTE — PROGRESS NOTE ADULT - PROBLEM SELECTOR PROBLEM 4
Chronic atrial fibrillation
CAD (coronary artery disease)
Chronic atrial fibrillation

## 2025-05-30 LAB
ALBUMIN SERPL ELPH-MCNC: 3.2 G/DL — LOW (ref 3.3–5.2)
ALP SERPL-CCNC: 95 U/L — SIGNIFICANT CHANGE UP (ref 40–120)
ALT FLD-CCNC: <5 U/L — SIGNIFICANT CHANGE UP
ANION GAP SERPL CALC-SCNC: 11 MMOL/L — SIGNIFICANT CHANGE UP (ref 5–17)
ANISOCYTOSIS BLD QL: SLIGHT — SIGNIFICANT CHANGE UP
AST SERPL-CCNC: 16 U/L — SIGNIFICANT CHANGE UP
BASOPHILS # BLD AUTO: 0.02 K/UL — SIGNIFICANT CHANGE UP (ref 0–0.2)
BASOPHILS # BLD MANUAL: 0.05 K/UL — SIGNIFICANT CHANGE UP (ref 0–0.2)
BASOPHILS NFR BLD AUTO: 0.4 % — SIGNIFICANT CHANGE UP (ref 0–2)
BASOPHILS NFR BLD MANUAL: 0.9 % — SIGNIFICANT CHANGE UP (ref 0–2)
BILIRUB SERPL-MCNC: 1.9 MG/DL — SIGNIFICANT CHANGE UP (ref 0.4–2)
BLD GP AB SCN SERPL QL: SIGNIFICANT CHANGE UP
BUN SERPL-MCNC: 29 MG/DL — HIGH (ref 8–20)
CALCIUM SERPL-MCNC: 8.2 MG/DL — LOW (ref 8.4–10.5)
CHLORIDE SERPL-SCNC: 96 MMOL/L — SIGNIFICANT CHANGE UP (ref 96–108)
CO2 SERPL-SCNC: 27 MMOL/L — SIGNIFICANT CHANGE UP (ref 22–29)
CREAT SERPL-MCNC: 5.2 MG/DL — HIGH (ref 0.5–1.3)
EGFR: 12 ML/MIN/1.73M2 — LOW
EGFR: 12 ML/MIN/1.73M2 — LOW
ELLIPTOCYTES BLD QL SMEAR: SLIGHT — SIGNIFICANT CHANGE UP
EOSINOPHIL # BLD AUTO: 0.45 K/UL — SIGNIFICANT CHANGE UP (ref 0–0.5)
EOSINOPHIL # BLD MANUAL: 0.49 K/UL — SIGNIFICANT CHANGE UP (ref 0–0.5)
EOSINOPHIL NFR BLD AUTO: 8.8 % — HIGH (ref 0–6)
EOSINOPHIL NFR BLD MANUAL: 9.6 % — HIGH (ref 0–6)
GIANT PLATELETS BLD QL SMEAR: PRESENT
GLUCOSE SERPL-MCNC: 94 MG/DL — SIGNIFICANT CHANGE UP (ref 70–99)
HCT VFR BLD CALC: 25.5 % — LOW (ref 39–50)
HGB BLD-MCNC: 7.9 G/DL — LOW (ref 13–17)
IMM GRANULOCYTES # BLD AUTO: 0.02 K/UL — SIGNIFICANT CHANGE UP (ref 0–0.07)
IMM GRANULOCYTES NFR BLD AUTO: 0.4 % — SIGNIFICANT CHANGE UP (ref 0–0.9)
LYMPHOCYTES # BLD AUTO: 0.55 K/UL — LOW (ref 1–3.3)
LYMPHOCYTES # BLD MANUAL: 0.45 K/UL — LOW (ref 1–3.3)
LYMPHOCYTES NFR BLD AUTO: 10.7 % — LOW (ref 13–44)
LYMPHOCYTES NFR BLD MANUAL: 8.7 % — LOW (ref 13–44)
MAGNESIUM SERPL-MCNC: 1.8 MG/DL — SIGNIFICANT CHANGE UP (ref 1.6–2.6)
MANUAL REACTIVE LYMPHOCYTES #: 0.05 K/UL — SIGNIFICANT CHANGE UP (ref 0–0.63)
MCHC RBC-ENTMCNC: 30.7 PG — SIGNIFICANT CHANGE UP (ref 27–34)
MCHC RBC-ENTMCNC: 31 G/DL — LOW (ref 32–36)
MCV RBC AUTO: 99.2 FL — SIGNIFICANT CHANGE UP (ref 80–100)
MONOCYTES # BLD AUTO: 0.57 K/UL — SIGNIFICANT CHANGE UP (ref 0–0.9)
MONOCYTES # BLD MANUAL: 0.22 K/UL — SIGNIFICANT CHANGE UP (ref 0–0.9)
MONOCYTES NFR BLD AUTO: 11.1 % — SIGNIFICANT CHANGE UP (ref 2–14)
MONOCYTES NFR BLD MANUAL: 4.3 % — SIGNIFICANT CHANGE UP (ref 2–14)
NEUTROPHILS # BLD AUTO: 3.52 K/UL — SIGNIFICANT CHANGE UP (ref 1.8–7.4)
NEUTROPHILS # BLD MANUAL: 3.88 K/UL — SIGNIFICANT CHANGE UP (ref 1.8–7.4)
NEUTROPHILS NFR BLD AUTO: 68.6 % — SIGNIFICANT CHANGE UP (ref 43–77)
NEUTROPHILS NFR BLD MANUAL: 75.6 % — SIGNIFICANT CHANGE UP (ref 43–77)
NRBC # BLD AUTO: 0 K/UL — SIGNIFICANT CHANGE UP (ref 0–0)
NRBC # FLD: 0 K/UL — SIGNIFICANT CHANGE UP (ref 0–0)
NRBC BLD AUTO-RTO: 0 /100 WBCS — SIGNIFICANT CHANGE UP (ref 0–0)
OVALOCYTES BLD QL SMEAR: SLIGHT — SIGNIFICANT CHANGE UP
PHOSPHATE SERPL-MCNC: 3.9 MG/DL — SIGNIFICANT CHANGE UP (ref 2.4–4.7)
PLAT MORPH BLD: NORMAL — SIGNIFICANT CHANGE UP
PLATELET # BLD AUTO: 105 K/UL — LOW (ref 150–400)
PMV BLD: 11.1 FL — SIGNIFICANT CHANGE UP (ref 7–13)
POIKILOCYTOSIS BLD QL AUTO: SLIGHT — SIGNIFICANT CHANGE UP
POTASSIUM SERPL-MCNC: 4.3 MMOL/L — SIGNIFICANT CHANGE UP (ref 3.5–5.3)
POTASSIUM SERPL-SCNC: 4.3 MMOL/L — SIGNIFICANT CHANGE UP (ref 3.5–5.3)
PROT SERPL-MCNC: 5.7 G/DL — LOW (ref 6.6–8.7)
RBC # BLD: 2.57 M/UL — LOW (ref 4.2–5.8)
RBC # FLD: 16.3 % — HIGH (ref 10.3–14.5)
RBC BLD AUTO: ABNORMAL
SMUDGE CELLS # BLD: PRESENT
SODIUM SERPL-SCNC: 134 MMOL/L — LOW (ref 135–145)
VARIANT LYMPHS # BLD: 0.9 % — SIGNIFICANT CHANGE UP (ref 0–6)
VARIANT LYMPHS NFR BLD MANUAL: 0.9 % — SIGNIFICANT CHANGE UP (ref 0–6)
WBC # BLD: 5.13 K/UL — SIGNIFICANT CHANGE UP (ref 3.8–10.5)
WBC # FLD AUTO: 5.13 K/UL — SIGNIFICANT CHANGE UP (ref 3.8–10.5)

## 2025-05-30 PROCEDURE — 99233 SBSQ HOSP IP/OBS HIGH 50: CPT

## 2025-05-30 PROCEDURE — 99232 SBSQ HOSP IP/OBS MODERATE 35: CPT

## 2025-05-30 RX ORDER — SACUBITRIL AND VALSARTAN 49; 51 MG/1; MG/1
1 TABLET, FILM COATED ORAL
Refills: 0 | Status: DISCONTINUED | OUTPATIENT
Start: 2025-05-30 | End: 2025-06-03

## 2025-05-30 RX ORDER — HYDROMORPHONE/SOD CHLOR,ISO/PF 2 MG/10 ML
0.2 SYRINGE (ML) INJECTION ONCE
Refills: 0 | Status: DISCONTINUED | OUTPATIENT
Start: 2025-05-30 | End: 2025-05-31

## 2025-05-30 RX ORDER — ASPIRIN 325 MG
81 TABLET ORAL DAILY
Refills: 0 | Status: DISCONTINUED | OUTPATIENT
Start: 2025-05-30 | End: 2025-06-03

## 2025-05-30 RX ORDER — ISOSORBIDE MONONITRATE 60 MG/1
30 TABLET, EXTENDED RELEASE ORAL DAILY
Refills: 0 | Status: DISCONTINUED | OUTPATIENT
Start: 2025-05-30 | End: 2025-06-03

## 2025-05-30 RX ORDER — MIDODRINE HYDROCHLORIDE 5 MG/1
2.5 TABLET ORAL THREE TIMES A DAY
Refills: 0 | Status: DISCONTINUED | OUTPATIENT
Start: 2025-05-30 | End: 2025-05-30

## 2025-05-30 RX ORDER — HYDROMORPHONE/SOD CHLOR,ISO/PF 2 MG/10 ML
0.5 SYRINGE (ML) INJECTION EVERY 4 HOURS
Refills: 0 | Status: DISCONTINUED | OUTPATIENT
Start: 2025-05-30 | End: 2025-06-03

## 2025-05-30 RX ADMIN — SACUBITRIL AND VALSARTAN 1 TABLET(S): 49; 51 TABLET, FILM COATED ORAL at 08:19

## 2025-05-30 RX ADMIN — OXYCODONE HYDROCHLORIDE 10 MILLIGRAM(S): 30 TABLET ORAL at 20:25

## 2025-05-30 RX ADMIN — OXYCODONE HYDROCHLORIDE 10 MILLIGRAM(S): 30 TABLET ORAL at 19:25

## 2025-05-30 RX ADMIN — Medication 325 MILLIGRAM(S): at 11:25

## 2025-05-30 RX ADMIN — Medication 667 MILLIGRAM(S): at 08:18

## 2025-05-30 RX ADMIN — TAMSULOSIN HYDROCHLORIDE 0.4 MILLIGRAM(S): 0.4 CAPSULE ORAL at 22:19

## 2025-05-30 RX ADMIN — Medication 667 MILLIGRAM(S): at 11:26

## 2025-05-30 RX ADMIN — Medication 40 MILLIGRAM(S): at 11:25

## 2025-05-30 RX ADMIN — Medication 0.5 MILLIGRAM(S): at 01:13

## 2025-05-30 RX ADMIN — FOLIC ACID 1 MILLIGRAM(S): 1 TABLET ORAL at 11:25

## 2025-05-30 RX ADMIN — Medication 500 MILLILITER(S): at 05:33

## 2025-05-30 RX ADMIN — LEVETIRACETAM 500 MILLIGRAM(S): 10 INJECTION, SOLUTION INTRAVENOUS at 22:19

## 2025-05-30 RX ADMIN — Medication 1 APPLICATION(S): at 05:34

## 2025-05-30 RX ADMIN — ATORVASTATIN CALCIUM 40 MILLIGRAM(S): 80 TABLET, FILM COATED ORAL at 22:20

## 2025-05-30 RX ADMIN — Medication 0.5 MILLIGRAM(S): at 05:33

## 2025-05-30 RX ADMIN — LEVETIRACETAM 500 MILLIGRAM(S): 10 INJECTION, SOLUTION INTRAVENOUS at 08:18

## 2025-05-30 NOTE — PROGRESS NOTE ADULT - SUBJECTIVE AND OBJECTIVE BOX
JONATHAN GIBSON    [id number]    64y      Male    INTERVAL HPI/OVERNIGHT EVENTS:  patient being seen for groin hematoma and esrd. patient had hd yesterday    last 24 hours patient had ct abd shoing improved hematoma.       REVIEW OF SYSTEMS:    CONSTITUTIONAL: No fever, weight loss, or fatigue  RESPIRATORY: No cough, wheezing, hemoptysis; No shortness of breath  CARDIOVASCULAR: No chest pain, palpitations  GASTROINTESTINAL: No abdominal or epigastric pain. No nausea, vomiting  NEUROLOGICAL: No headaches, memory loss, loss of strength.  MISCELLANEOUS:      Vital Signs Last 24 Hrs  T(C): 36.8 (30 May 2025 11:02), Max: 36.8 (30 May 2025 11:02)  T(F): 98.2 (30 May 2025 11:02), Max: 98.2 (30 May 2025 11:02)  HR: 66 (30 May 2025 11:20) (59 - 75)  BP: 102/50 (30 May 2025 14:48) (89/38 - 151/78)  BP(mean): 67 (30 May 2025 14:48) (55 - 83)  RR: 18 (30 May 2025 11:02) (18 - 19)  SpO2: 94% (30 May 2025 11:02) (94% - 97%)    Parameters below as of 29 May 2025 23:04  Patient On (Oxygen Delivery Method): room air        PHYSICAL EXAM:    GENERAL: pt examined bedside, laying comfortably in bed in NAD  HEENT: NC/AT, moist oral mucosa, pale conjunctiva, sclera nonicteric  RESPIRATORY: Normal respiratory effort; CTA b/l, no wheezing, rhonchi, rales  CARDIOVASCULAR: irregularly irregular, normal S1 and S2, +murmurs  ABDOMEN: soft, NT/ND, normoactive bowel sounds, no rebound/guarding  EXTREMITIES: No cynaosis, no clubbing, groin hematoma slightly enlarged  NEUROLOGY: A+O to person, place, and time, no focal neurologic deficits appreciated   SKIN: pallor, Rt groin hematoma w/ dressing, drain    LABS:                        7.9    5.13  )-----------( 105      ( 30 May 2025 06:10 )             25.5     05-30    134[L]  |  96  |  29.0[H]  ----------------------------<  94  4.3   |  27.0  |  5.20[H]    Ca    8.2[L]      30 May 2025 06:10  Phos  3.9     05-30  Mg     1.8     05-30    TPro  5.7[L]  /  Alb  3.2[L]  /  TBili  1.9  /  DBili  x   /  AST  16  /  ALT  <5  /  AlkPhos  95  05-30      Urinalysis Basic - ( 30 May 2025 06:10 )    Color: x / Appearance: x / SG: x / pH: x  Gluc: 94 mg/dL / Ketone: x  / Bili: x / Urobili: x   Blood: x / Protein: x / Nitrite: x   Leuk Esterase: x / RBC: x / WBC x   Sq Epi: x / Non Sq Epi: x / Bacteria: x      MEDICATIONS  (STANDING):  atorvastatin 40 milliGRAM(s) Oral at bedtime  calcium acetate 667 milliGRAM(s) Oral three times a day with meals  carvedilol 6.25 milliGRAM(s) Oral every 12 hours  chlorhexidine 2% Cloths 1 Application(s) Topical <User Schedule>  epoetin yolanda-epbx (RETACRIT) Injectable 34962 Unit(s) IV Push <User Schedule>  ferrous    sulfate 325 milliGRAM(s) Oral daily  folic acid 1 milliGRAM(s) Oral daily  isosorbide   mononitrate ER Tablet (IMDUR) 30 milliGRAM(s) Oral daily  levETIRAcetam 500 milliGRAM(s) Oral two times a day  pantoprazole  Injectable 40 milliGRAM(s) IV Push daily  sacubitril 24 mG/valsartan 26 mG 1 Tablet(s) Oral two times a day  tamsulosin 0.4 milliGRAM(s) Oral at bedtime    MEDICATIONS  (PRN):  acetaminophen     Tablet .. 650 milliGRAM(s) Oral every 6 hours PRN Mild Pain (1 - 3)  hydrALAZINE Injectable 10 milliGRAM(s) IV Push every 3 hours PRN sbp>160  HYDROmorphone  Injectable 0.5 milliGRAM(s) IV Push every 4 hours PRN Severe Pain (7 - 10)  ondansetron Injectable 4 milliGRAM(s) IV Push every 8 hours PRN Nausea and/or Vomiting  oxyCODONE    IR 10 milliGRAM(s) Oral every 4 hours PRN Severe Pain (7 - 10)  oxyCODONE    IR 5 milliGRAM(s) Oral every 4 hours PRN Moderate Pain (4 - 6)  sodium chloride 0.9% lock flush 10 milliLiter(s) IV Push every 1 hour PRN Pre/post blood products, medications, blood draw, and to maintain line patency      RADIOLOGY & ADDITIONAL TESTS:

## 2025-05-30 NOTE — PROGRESS NOTE ADULT - ASSESSMENT
ESRD:  - HD tomorrow  - UF as tolerates    Anemia: + CKD + thigh hematoma  - cont REINALDO  - further PRBCs as needed  - target Hgb > 10.0    RO:  - low phos diet  - cont Phoslo

## 2025-05-30 NOTE — PROGRESS NOTE ADULT - PROBLEM SELECTOR PROBLEM 1
Acute systolic heart failure
Acute systolic heart failure
CAD (coronary artery disease)
CAD (coronary artery disease)
End stage renal disease
Acute systolic heart failure
Chronic diastolic heart failure
Acute systolic heart failure
Hemorrhagic shock

## 2025-05-30 NOTE — PROGRESS NOTE ADULT - ASSESSMENT
65 y/o M with significant PMH ESRD s/p renal transplant on HD Tu/Th/Sat, type A aortic dissection repair (2010), ischemic bowel resection, CAD s/p CABG x 2 (SVG-OM1 and SVG-RPDA), bioprosthetic AVR/MVR (2020 Dr. Butler), Afib s/p ablation (2021), LAAO device (8/2024), MV endocarditis (2023), remote history of substance abuse and former smoker who presented with c/o shortness of breath.    TTE this admission revealed newly reduced LV systolic function 35-40%. On 5/22 LHC was attempted but unable to pass through aortic root in view of prior type A dissection repair. RHC showed mildly elevated filling pressures and increased CO (of note, pt has AVF graft). He is s/p CCTA which showed a calcium score of 4754 and significant multivessel CAD with multiple areas of indeterminate stenosis.    On 5/23 he developed a right groin hematoma and was urgently taken to OR for open cut-down and evacuation of 1L of fresh blood with hemorrhagic shock requiring  4 prbc, 2 ffp, 1 plt. The femoral artery behind profunda vein was ligated and transected, 3 mm arteriotomy w/ active bleeding at common femoral artery bifurcation, repaired with suture and cessation of bleeding. His last PRBC was 5/27 during HD.    Cardiac Testing:  TTE 5/19/25: LVEF 35-40%, LVIDD 4.6cm, LVOT VTI 15.9cm, moderate to severe LVH, normal RV size/function, well seated normal functioning bioprosthetic aortic valve, well seated normal functioning bioprosthetic mitral valve with trace intravalvular regurgitation, PASP 83 mmHg (RAP 15 mmHg).     TTE 4/14/25: LVEF 65-70%, LVIDd 4.9cm, LVOT VTI 32.6cm, normal RV size/function, mild JESSICA, bioprosthetic aortic valve with no AS and trace intravalvular regurgitation, bioprosthetic mitral valve with trace intravalvular regurgitation (PG14.9 mmHg, MG 2.77 mmHg), mild to moderate TR, PASP 83 mmHg (RAP 15 mmHg). Aortic root dilated 4.32 cm. No pericardial effusion.     TTE 12/4/24: LVEF 50-55%, LVIDD 4.4cm, LVOT VTI 17cm, moderate LVH, RVE with reduced RV systolic function, bioprosthetic aortic valve w/ trace intravalvular regurgitation (PV 2.24 m/s, PG 20.1 mmHg, MG11.0 mmHg w/ LVOT/aortic valve VTI ratio 0.40. ALEC 1.50 cm² by the continuity equation), bioprosthetic mitral valve with normal function (PG 14.4 mmHg, MG 7.00 mmHg at a heart rate of 103 bpm), moderate TR, PASP 55 mmHg (RAP 15 mmHg). Presence of abdominal ascites incidentally noted. No pericardial effusion.

## 2025-05-30 NOTE — PHYSICAL THERAPY INITIAL EVALUATION ADULT - PERTINENT HX OF CURRENT PROBLEM, REHAB EVAL
Pt is a 65 y/o male who presented from home with SOB. (+) hemorrhagic shock 2/2 right groin hematoma.

## 2025-05-30 NOTE — PROGRESS NOTE ADULT - PROBLEM SELECTOR PROBLEM 3
S/P evacuation of hematoma
S/P evacuation of hematoma
CAD (coronary artery disease)
Hemorrhagic shock
CAD (coronary artery disease)
Acute systolic heart failure
CAD (coronary artery disease)
HTN (hypertension)

## 2025-05-30 NOTE — PHYSICAL THERAPY INITIAL EVALUATION ADULT - LEVEL OF INDEPENDENCE: SIT/SUPINE, REHAB EVAL
44m admitted for depression with self-inflicted laceration on left forearm    Plan:    L forearm laceration: Appears erythematous and swollen though patient reports improvement, also notes 4 days of pus like discharge, no active discharge seen, just crusting. No systemic f/c, afebrile. Will cover for purulent cellulitis with Bactrim x 5 days for now. Can remove sutures today. Will follow up tomorrow for wound care recommendations.     Opioid dependence on agonist therapy: Continue methadone    Depression: Management per primary team independent

## 2025-05-30 NOTE — PROGRESS NOTE ADULT - SUBJECTIVE AND OBJECTIVE BOX
[facility]ART FAILURE - PROGRESS NOTE  [address] [place] [address]  Office Phone: [phone]/Fax: [phone]  Service/On Call Phone [phone]  _______________________________________________________________________________________________________    Subjective:  - NAEO  - Patient endorses ongoing discomfort from right groin but denies any SOB, CP, palpitations, LH/dizziness.    Medications:  acetaminophen     Tablet .. 650 milliGRAM(s) Oral every 6 hours PRN  atorvastatin 40 milliGRAM(s) Oral at bedtime  calcium acetate 667 milliGRAM(s) Oral three times a day with meals  carvedilol 6.25 milliGRAM(s) Oral every 12 hours  chlorhexidine 2% Cloths 1 Application(s) Topical <User Schedule>  epoetin yolanda-epbx (RETACRIT) Injectable 74940 Unit(s) IV Push <User Schedule>  ferrous    sulfate 325 milliGRAM(s) Oral daily  folic acid 1 milliGRAM(s) Oral daily  hydrALAZINE Injectable 10 milliGRAM(s) IV Push every 3 hours PRN  HYDROmorphone  Injectable 0.5 milliGRAM(s) IV Push every 4 hours PRN  isosorbide   mononitrate ER Tablet (IMDUR) 30 milliGRAM(s) Oral daily  levETIRAcetam 500 milliGRAM(s) Oral two times a day  ondansetron Injectable 4 milliGRAM(s) IV Push every 8 hours PRN  oxyCODONE    IR 10 milliGRAM(s) Oral every 4 hours PRN  oxyCODONE    IR 5 milliGRAM(s) Oral every 4 hours PRN  pantoprazole  Injectable 40 milliGRAM(s) IV Push daily  sacubitril 24 mG/valsartan 26 mG 1 Tablet(s) Oral two times a day  sodium chloride 0.9% lock flush 10 milliLiter(s) IV Push every 1 hour PRN  tamsulosin 0.4 milliGRAM(s) Oral at bedtime      ICU Vital Signs Last 24 Hrs  T(C): 36.8, Max: 36.8 ( @ 11:02)  HR: 66 (59 - 75)  BP: 104/49 (89/38 - 151/78)  BP(mean): 68 (55 - 83)  ABP: --  ABP(mean): --  RR: 18 (18 - 19)  SpO2: 94% (94% - 97%)    Weight in k.4 (25)  Weight in k.8 (25)      I&O's Summary Last 24 Hrs    IN: 0 mL / OUT: 1048 mL / NET: -1048 mL      Tele: AP 60s, 5 bts NSVT last night.    Physical Exam:    General: No distress. Comfortable.  Neck: JVP not elevated.  Respiratory: Clear to auscultation bilaterally  CV: RRR. Normal S1 and S2. No murmurs, rub, or gallops. Radial pulses normal.  Abdomen: Soft, non-distended, non-tender  Extremities: Warm, no edema  Neurology: Non-focal, alert and oriented times three.   Psych: Affect normal    Labs:    ( 25 @ 06:10 )               7.9    5.13  )--------( 105                  25.5     ( 25 @ 06:10 )     134  |  96  |  29.0  ---------------------<  94  4.3  |  27.0  |  5.20    Ca 8.2  Phos 3.9  Mg 1.8    ( 25 @ 06:10 )  TPro  5.7  /  Alb  3.2  /  TBili  1.9  /  DBili  x   /  AST  16  /  ALT  <5  /  AlkPhos  95  ( 25 @ 03:55 )  TPro  6.0  /  Alb  3.4  /  TBili  1.6  /  DBili  x   /  AST  12  /  ALT  <5  /  AlkPhos  94    ( 25 @ 14:20 )  TropHS 113   / CK x     / CKMB x      ( 25 @ 12:05 )  TropHS 117   / CK x     / CKMB x

## 2025-05-30 NOTE — PROGRESS NOTE ADULT - ASSESSMENT
Assessment   63 y/o M with significant PMH ESRD s/p renal transplant on HD Tu/Th/Sat, type A aortic dissection repair (2010), ischemic bowel resection, CAD s/p CABG x 2 (SVG-OM1and SVG-RPDA), bioprosthetic AVR/MVR (2020 Dr. Butler), Afib s/p ablation (2021),PPM  LAAO device (8/2024), MV endocarditis (2023), remote history of substance abuse and former smoker who presented with c/o shortness of breath. Course complicated by Rt groin hematoma urgently taken to OR for exploration/ evacuation with Hemorrhagic shock requiring  PRBC. Post cut down arteriotomy and evacuation.  Pt seen this afternoon remaining hemodynamically stable.      PLAN:     -Continue to closely monitor R groin site. Per Vascular Sx, still following  CTA pelvis + RLE completed today and showing some improvement. Discussed with Dr Gan above case and results IC signing off.   -Continue Coreg, Entresto for HFrEF  -Continue Imdur for ICM  -Continue ASA for CAD  -Interventional Cardiology will continue to follow    Case discussed with Dr. Gan.       Assessment   65 y/o M with significant PMH ESRD s/p renal transplant on HD Tu/Th/Sat, type A aortic dissection repair (2010), ischemic bowel resection, CAD s/p CABG x 2 (SVG-OM1and SVG-RPDA), bioprosthetic AVR/MVR (2020 Dr. Butler), Afib s/p ablation (2021),PPM  LAAO device (8/2024), MV endocarditis (2023), remote history of substance abuse and former smoker who presented with c/o shortness of breath. Course complicated by Rt groin hematoma urgently taken to OR for exploration/ evacuation with Hemorrhagic shock requiring  PRBC. Post cut down arteriotomy and evacuation.  Pt seen this afternoon remaining hemodynamically stable.      PLAN:     -Continue to closely monitor R groin site. Per Vascular Sx, still following  CTA pelvis + RLE completed today and showing some improvement. Discussed with Dr Gan above case and results IC signing off.   -Continue Coreg, Entresto for HFrEF  -Continue Imdur for ICM  -Continue ASA for CAD    Case discussed with Dr. Gan.

## 2025-05-30 NOTE — PHYSICAL THERAPY INITIAL EVALUATION ADULT - ACTIVE RANGE OF MOTION EXAMINATION, REHAB EVAL
held right hip ROM/bilateral upper extremity Active ROM was WFL (within functional limits)/bilateral  lower extremity Active ROM was WFL (within functional limits)/deficits as listed below

## 2025-05-30 NOTE — PROVIDER CONTACT NOTE (OTHER) - SITUATION
Patient had 5 beats of V tach not symptomatic, vital signs stable and in flowsheet
patient was c/o of feeling nausea and had one episode of emesis, patient was also hypotensive and stating he felt a little dizzy, Patient did receive dialysis today and believes it could be related
hematoma is back and increasing in size, noted to be expanding from below site of fem stop extending to inner medial thigh
pt bp of 89/38 map of 55 on right UA. on right leg bp 111/47, pt is pink banded on left arm, and has iv in right upper arm (hard stick/US guided) hence why bp was taken in leg

## 2025-05-30 NOTE — PROGRESS NOTE ADULT - SUBJECTIVE AND OBJECTIVE BOX
NEPHROLOGY INTERVAL HPI/OVERNIGHT EVENTS:  pt resting comfortably  still some pain    MEDICATIONS  (STANDING):  atorvastatin 40 milliGRAM(s) Oral at bedtime  calcium acetate 667 milliGRAM(s) Oral three times a day with meals  carvedilol 6.25 milliGRAM(s) Oral every 12 hours  chlorhexidine 2% Cloths 1 Application(s) Topical <User Schedule>  epoetin yolanda-epbx (RETACRIT) Injectable 46884 Unit(s) IV Push <User Schedule>  ferrous    sulfate 325 milliGRAM(s) Oral daily  folic acid 1 milliGRAM(s) Oral daily  isosorbide   mononitrate ER Tablet (IMDUR) 60 milliGRAM(s) Oral daily  levETIRAcetam 500 milliGRAM(s) Oral two times a day  pantoprazole  Injectable 40 milliGRAM(s) IV Push daily  sacubitril 49 mG/valsartan 51 mG 1 Tablet(s) Oral two times a day  tamsulosin 0.4 milliGRAM(s) Oral at bedtime    MEDICATIONS  (PRN):  acetaminophen     Tablet .. 650 milliGRAM(s) Oral every 6 hours PRN Mild Pain (1 - 3)  hydrALAZINE Injectable 10 milliGRAM(s) IV Push every 3 hours PRN sbp>160  HYDROmorphone  Injectable 0.5 milliGRAM(s) IV Push every 4 hours PRN Severe Pain (7 - 10)  ondansetron Injectable 4 milliGRAM(s) IV Push every 8 hours PRN Nausea and/or Vomiting  oxyCODONE    IR 10 milliGRAM(s) Oral every 4 hours PRN Severe Pain (7 - 10)  oxyCODONE    IR 5 milliGRAM(s) Oral every 4 hours PRN Moderate Pain (4 - 6)  sodium chloride 0.9% lock flush 10 milliLiter(s) IV Push every 1 hour PRN Pre/post blood products, medications, blood draw, and to maintain line patency      Allergies    penicillin (Other)        Vital Signs Last 24 Hrs  T(C): 36.6 (30 May 2025 04:47), Max: 36.7 (29 May 2025 15:56)  T(F): 97.9 (30 May 2025 04:47), Max: 98.1 (29 May 2025 15:56)  HR: 59 (30 May 2025 08:25) (59 - 72)  BP: 129/63 (30 May 2025 08:25) (96/59 - 151/78)  BP(mean): 83 (30 May 2025 08:25) (83 - 83)  RR: 18 (30 May 2025 04:47) (18 - 19)  SpO2: 94% (30 May 2025 04:47) (94% - 98%)    Parameters below as of 29 May 2025 23:04  Patient On (Oxygen Delivery Method): room air        PHYSICAL EXAM:  GENERAL: Debilitated  HEENT: No periorbital edema  NECK: Supple, No JVD  NERVOUS SYSTEM:  A/O x3  CHEST: Clear, diminished BS  HEART:  RRR, no rub  ABDOMEN: Soft, NT/ND BS+  EXTREMITIES: Tr dependent edema      LABS:                        7.9    5.13  )-----------( 105      ( 30 May 2025 06:10 )             25.5     05-30    134[L]  |  96  |  29.0[H]  ----------------------------<  94  4.3   |  27.0  |  5.20[H]    Ca    8.2[L]      30 May 2025 06:10  Phos  3.9     05-30  Mg     1.8     05-30    TPro  5.7[L]  /  Alb  3.2[L]  /  TBili  1.9  /  DBili  x   /  AST  16  /  ALT  <5  /  AlkPhos  95  05-30      Urinalysis Basic - ( 30 May 2025 06:10 )    Color: x / Appearance: x / SG: x / pH: x  Gluc: 94 mg/dL / Ketone: x  / Bili: x / Urobili: x   Blood: x / Protein: x / Nitrite: x   Leuk Esterase: x / RBC: x / WBC x   Sq Epi: x / Non Sq Epi: x / Bacteria: x      Magnesium: 1.8 mg/dL (05-30 @ 06:10)  Phosphorus: 3.9 mg/dL (05-30 @ 06:10)      RADIOLOGY & ADDITIONAL TESTS:  < from: CT Abdomen and Pelvis No Cont (05.29.25 @ 19:26) >    ACC: [id number] EXAM:  CT ABDOMEN AND PELVIS   ORDERED BY: ARIC STEIN     PROCEDURE DATE:  05/29/2025          INTERPRETATION:  CLINICAL INFORMATION: 70-year-old male with a hematoma   which appears to be getting larger    COMPARISON: May 23, 2025    CONTRAST/COMPLICATIONS:  IV Contrast: NONE  Oral Contrast: NONE  .    PROCEDURE:  CT of the Abdomen and Pelvis was performed.  Sagittal and coronal reformats were performed.    FINDINGS:    Lack of intravenous contrast limits evaluation of the abdominal/pelvic   viscera. Lack of oral contrast limits evaluation of the bowel.    LOWER CHEST: Coronary artery calcifications. Dense mitral valve annular   calcifications. Left lower lobe atelectasis.    LIVER: Within normal limits.  BILE DUCTS: Normal caliber.  GALLBLADDER: Cholelithiasis.  SPLEEN: Within normal limits.  PANCREAS: Within normal limits.  ADRENALS: Within normal limits.  KIDNEYS/URETERS: Within normal limits. Bilateral renal cysts.    BLADDER: Minimally distended.  REPRODUCTIVE ORGANS: Prostate within normal limits.    BOWEL: No bowel obstruction.  PERITONEUM/RETROPERITONEUM: Within normal limits.  VESSELS: There is a calcified chronic aortic dissection, calcified   atherosclerosis of the iliac arteries.  LYMPH NODES: No lymphadenopathy.  ABDOMINAL WALL: Within normal limits.  BONES: Degenerative changes.    There is asymmetric swelling of the right thigh soft tissues. A drain is   seen entering from the anterior upper thigh, residing in the medial soft   tissues of the thigh. The previously seen hematoma is much smaller. A   focal component of the hematoma anteriorly in the right thigh measures   3.9 x 3.5 cm and contains bubbles of air.    IMPRESSION:    The right thigh hematoma is much smaller on today's study than it was on   the study from March 23, 2025.    Air within the hematoma may be iatrogenic or related to   evacuation/presence of the drain.  Infection cannot be excluded,   correlate clinically.    < end of copied text >

## 2025-05-30 NOTE — PROGRESS NOTE ADULT - ASSESSMENT
65 y/o M w/ PMH of ESRD s/p renal transplant on HD Tu/Th/Sat, type A aortic dissection s/p repair (2010), ischemic bowel resection, CAD s/p CABG x 2 (SVG-OM1and SVG-RPDA), bioprosthetic AVR/MVR (2020 w/ Dr. Butler), AF s/p ablation (2021) and LAAO device (8/2024), MV endocarditis (2023), seizure disorder, BPH, remote hx of substance abuse and former smoker initially presented to [facility] c/o SOB and was admitted 5/19/25 to medicine for new acute CHF exacerbation.  TTE on 5/19/25 was significant for newly reduced LV EF 35-40%.  Pt underwent RHC/LHC on 5/22/25 via RFA.  RHC showed mildly elevated filling pressures and increased CO.  During LHC was unable to pass through aortic root due to prior type A dissection repair.  Hospital course complicated by pt developing Rt groin hematoma on 05/23 noted in AM.  Cardio/cath lab called.  Initially manual pressure applied and fem stop placed but hematoma reoccurred/expanded refractory to fem stop.  Vascular was consulted and stat CTA RLE was ordered which showed a massive hematoma and pt was urgently taken to OR for exploration/ evacuation of  RLE hematoma,  Pt received 1 unit PRBC pre OR but during OR pt went into hemorrhagic shock and MTP was initiated and pt was subsequently given another 4u pRBC, 2u FFP and 1u plt in OR.  Pt s/p open cut-down and evacuation of 1L of fresh blood, femoral artery behind profunda vein was ligated and transected, 3mm arteriotomy w/ active bleeding at common femoral artery bifurcation repaired with suture, bleeding stopped and Rt groin DEDRICK drain placed.  Given pt received significant amount of blood products pre/intra-op decision was made to leave pt intubated for resp failure 2/2 vol overload from CHF but did not require pressor support.  dowmngraded from micu    #Hemorrhagic shock 2/2 rt groin hematoma s/p LHC VIA RFA   - No pressors required but did undergo MTP intra-op for hemorrhagic shock  - Pt s/p total of 6/2/1 prbc/ffp/plt during hospital course    - 5/23 s/p open cut-down and evacuation of 1L of fresh blood, femoral artery behind profunda vein was ligated and transected, 3mm arteriotomy w/ active bleeding at common femoral artery bifurcation, repaired with suture and cessation of bleeding  - Pt hemodynamically stable w/ RLE warm, palpable DP/PT noted and dressing in place that has minimal amount of dry blood appreciated   - Rt groin DEDRICK drain in place w/ small amount of blood at this time    - Maintained intubated s/p OR for concern for impending resp failure 2/2 vol overload given amount of blood products transfused in short time frame in setting of rEF   - pt successfully extubated AM of 5/24 and currently satting well ra  - Continue to trend H/h keep type and screen active  - Vascular surgery consult following, asa   - stop hep sub q  - epo during hd    #acute decompensated HFrEF likely 2/2 CAD   - Clinically euvolemic at this time and satting well ORA  - TTE 5/19/25: LVEF 35-40%, mod to severe LVH, normal RV size/function, well seated normal functioning bioprosthetic AV, well seated normal functioning bioprosthetic MV w/ trace intravalvular regurgitation, PASP 83 mmHg (RAP 15 mmHg)   - s/p RCH 5/22 showed mildly elevated filling pressures and increased CO  - s/p LHC 5/22 was attempted via RFA access but unable to pass through aortic root in view of prior type A dissection repair   - s/p CTA 05/23 revealed LM, LAD, RCA and circumflex were all reported to have diffuse significant calcified plaque noted w/ multiple areas of indeterminate stenoses.  Diagonal branches, OM, PDA, posterior branch and ramus intermedius were not well visualized.    - c/w coreg, entresto, and statin   -  po Hydralazine, torsemide, on hold due to hypotension  - Imdur resumed 5/26  - hd tomorrow    #Chronic HF  - s/p watchman procedure  - Not on AC  - c/w coreg     #HTN / HLD   - Continue holding Hydralazine PO, torsemide,   - Imdur and entresto resumed    #End stage renal disease  - c/w HD as per nephro   - Nephro following     #Seizure disorder  - c/w keppra     #BPH  - c/w flomax    VTE ppx:  hep sub q (on hold)  Dispo: still acute

## 2025-05-30 NOTE — PROGRESS NOTE ADULT - SUBJECTIVE AND OBJECTIVE BOX
[facility]                                                         CARDIOLOGY AT [facility]                                                                  [address] [facility], [place] [place] [address]                                                         Telephone: [phone]. Fax:[phone]                                                          INTERVENTIONAL CARDIOLOGY PROGRESS NOTE    65 y/o male PMH ESRD s/p renal transplant on HD Tu/Th/Sat, type A aortic dissection repair (2010), ischemic bowel resection, CAD s/p CABG x 2 (SVG-OM1and SVG-RPDA), bioprosthetic AVR/MVR (2020 Dr. Butler), Afib s/p ablation (2021),PPM  LAAO device (8/2024), MV endocarditis (2023), remote history of substance abuse and former smoker who presented with SOB, underwent R/LHC on 5/22/25 via RFA/RFV revealed Mildly elevated R/L filling pressures wit  increased CO/CI 9.6L/ 5.6L, SAT ; AO 89.2%, SAT; PA 71.7%, PA 52/71/35, PCW 16/28/17, RV 53/0/9, RA 12/12/8, /52/80 Significant tortuosity of ascending aorta in setting of prior surgical type A dissection repair, unable to advance catheters to aortic root, unable to visualize native coronary arteries or grafts.  Mildly elevated R and L sided filling pressures with PA (52/17 (35)), and PCWP (17). Increased CO/CI (9.6/L/min / 5.65L/min/m2), closed by RFA Angioseal.   Course complicated by Rt groin hematoma urgently taken to OR 5/23/25 for exploration/ evacuation with Hemorrhagic shock requiring  4 prbc, 2 ffp, 1 plt. S/p open cut-down and evacuation of 1L of fresh blood, femoral artery behind profunda vein was ligated and transected, 3 mm arteriotomy w/ active bleeding at common femoral artery bifurcation, repaired with suture and cessation of bleeding. Palpable DP/PT on RLE. 15F Zhao drain in groin.   Given 5/2/1 prbc/ffp/plt in OR and was left intubated due to concern for respiratory failure and volume overload in setting of blood products resuscitation   Now hemodynamically stable,  rt groin with swelling, Rt groin drain in place with minimal amount of sanguinous drainage.  Groin tender to touch, Femoral popliteal, DP/PT pulses palpable, RLE warm and perfusing    Overnight events - episode of hypotension fluids given.  CT of right FA with smaller hematoma vascular following pt .   Update: pt seen this afternoon VSS complaints of pain at right surgical site  refusing oral pain medications. Dedrick small amts of serous anginous drainage ( according to CT the hematoma is smaller in size)   Pt continues to be followed by heart failure and hospitalist.       Review of symptoms:   Cardiac:  No chest pain. No dyspnea. No palpitations.  Respiratory: no cough. No dyspnea  Gastrointestinal: No diarrhea. No abdominal pain. No bleeding.   Neuro: No focal neuro complaints.    Vitals:  T(C): 36.9 (05-30-25 @ 16:38), Max: 36.9 (05-30-25 @ 16:38)  HR: 61 (05-30-25 @ 16:38) (59 - 75)  BP: 117/60 (05-30-25 @ 16:38) (89/38 - 151/78)  RR: 18 (05-30-25 @ 16:38) (18 - 19)  SpO2: 94% (05-30-25 @ 16:38) (94% - 97%)  Wt(kg): --  I&O's Summary    29 May 2025 07:01  -  30 May 2025 07:00  --------------------------------------------------------  IN: 0 mL / OUT: 1048 mL / NET: -1048 mL    30 May 2025 07:01  -  30 May 2025 17:56  --------------------------------------------------------  IN: 0 mL / OUT: 40 mL / NET: -40 mL          PHYSICAL EXAM:  Appearance: Comfortable. No acute distress  HEENT:  Atraumatic. Normocephalic.  Normal oral mucosa  Neurologic: A & O x 3, no gross focal deficits.  Cardiovascular: RRR S1 S2, No murmur, no rubs/gallops. No JVD  Respiratory: Lungs clear to auscultation, unlabored   Gastrointestinal:  Soft, Non-tender, + BS  Lower Extremities: 1+ Peripheral Pulses  Psychiatry: Patient is calm. + agitation due to pain   Skin: warm and dry.  Right FA site D/I with DEDRICK drain in place serous anginous draining     CURRENT CARDIAC MEDICATIONS:  carvedilol 6.25 milliGRAM(s) Oral every 12 hours  hydrALAZINE Injectable 10 milliGRAM(s) IV Push every 3 hours PRN  isosorbide   mononitrate ER Tablet (IMDUR) 30 milliGRAM(s) Oral daily  sacubitril 24 mG/valsartan 26 mG 1 Tablet(s) Oral two times a day      CURRENT OTHER MEDICATIONS:  acetaminophen     Tablet .. 650 milliGRAM(s) Oral every 6 hours PRN Mild Pain (1 - 3)  HYDROmorphone  Injectable 0.5 milliGRAM(s) IV Push every 4 hours PRN breakthrough pain  levETIRAcetam 500 milliGRAM(s) Oral two times a day  ondansetron Injectable 4 milliGRAM(s) IV Push every 8 hours PRN Nausea and/or Vomiting  oxyCODONE    IR 10 milliGRAM(s) Oral every 4 hours PRN Severe Pain (7 - 10)  oxyCODONE    IR 5 milliGRAM(s) Oral every 4 hours PRN Moderate Pain (4 - 6)  pantoprazole  Injectable 40 milliGRAM(s) IV Push daily  atorvastatin 40 milliGRAM(s) Oral at bedtime  aspirin  chewable 81 milliGRAM(s) Oral daily  calcium acetate 667 milliGRAM(s) Oral three times a day with meals  chlorhexidine 2% Cloths 1 Application(s) Topical <User Schedule>  epoetin yolanda-epbx (RETACRIT) Injectable 67645 Unit(s) IV Push <User Schedule>  ferrous    sulfate 325 milliGRAM(s) Oral daily  folic acid 1 milliGRAM(s) Oral daily  sodium chloride 0.9% lock flush 10 milliLiter(s) IV Push every 1 hour PRN Pre/post blood products, medications, blood draw, and to maintain line patency  tamsulosin 0.4 milliGRAM(s) Oral at bedtime      LABS:	 	                            7.9    5.13  )-----------( 105      ( 30 May 2025 06:10 )             25.5     05-30    134[L]  |  96  |  29.0[H]  ----------------------------<  94  4.3   |  27.0  |  5.20[H]    Ca    8.2[L]      30 May 2025 06:10  Phos  3.9     05-30  Mg     1.8     05-30    TPro  5.7[L]  /  Alb  3.2[L]  /  TBili  1.9  /  DBili  x   /  AST  16  /  ALT  <5  /  AlkPhos  95  05-30    PT/INR/PTT ( 23 May 2025 20:29 )                       :                       :      15.0         :       31.9                  .        .                   .              .           .       1.30        .                                       	    CT SCAN 5/29/25 There is asymmetric swelling of the right thigh soft tissues. A drain is   seen entering from the anterior upper thigh, residing in the medial soft   tissues of the thigh. The previously seen hematoma is much smaller. A   focal component of the hematoma anteriorly in the right thigh measures   3.9 x 3.5 cm and contains bubbles of air.    IMPRESSION:    The right thigh hematoma is much smaller on today's study than it was on   the study from March 23, 2025.    Air within the hematoma may be iatrogenic or related to   evacuation/presence of the drain.  Infection cannot be excluded,   correlate clinically.

## 2025-05-30 NOTE — PROGRESS NOTE ADULT - PROBLEM SELECTOR PLAN 1
LVEF 35-40%, LVIDd 4.6cm, normal RV size/fx  Etiology: Unable to obtain LHC. S/p CTA which showed multivessel CAD.  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 Device interrogation showed AP 28%.  GDMT: Given hypotension yesterday, will decrease Entresto to 24-26 mg BID and Imdur to 60 mg daily. Continue Coreg 6.25 mg BID.  Fluid removal via additional HD sessions (T/Th/Sat).  Low Na, 1.5L FR diet recommended.  Compliance with HD sessions advised.  Multiple readmissions. May consider CardioMEMS however patient would have to be compliant with transmitting daily readings. LVEF 35-40%, LVIDd 4.6cm, normal RV size/fx  Etiology: Unable to obtain LHC. S/p CTA which showed multivessel CAD.  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 Device interrogation showed AP 28%.  GDMT: Given hypotension yesterday, will decrease Entresto to 24-26 mg BID and Imdur to 30 mg daily. Continue Coreg 6.25 mg BID.  Fluid removal via additional HD sessions (T/Th/Sat).  Low Na, 1.5L FR diet recommended.  Compliance with HD sessions advised.  Multiple readmissions. May consider CardioMEMS however patient would have to be compliant with transmitting daily readings.

## 2025-05-30 NOTE — CHART NOTE - NSCHARTNOTEFT_GEN_A_CORE
Right groin dressing changed this morning. Vascular will be following peripherally, dressing changes per RN to be ordered. May follow up outpatient with Dr. Liriano 1-2 weeks after discharge. Will need home wound and drain care.

## 2025-05-30 NOTE — PROGRESS NOTE ADULT - PROBLEM SELECTOR PLAN 3
- h/o CABG , AVR, MVR 2020  - h/o MV endocarditis 2023  - s/p leadless PPM for bradycardia  - Troponin 117->113, history of elevated troponin, likely demand in setting of ESRD.  - denies chest pain  - cont asa/statin  - Plan for LHC/RHC when stable. Reassess in AM
- h/o CABG , AVR, MVR 2020  - h/o MV endocarditis 2023  - s/p leadless PPM for bradycardia  - Troponin 117->113, history of elevated troponin, likely demand in setting of ESRD.  - denies chest pain  - cont asa/statin  - LHC/RHC today
Management per vascular surgery.  Monitor H/H, transfuse PRBC PRN.
- Patient s/p open cut-down and evacuation of 1L of fresh blood, femoral artery behind profunda vein was ligated and transected, 3 mm arteriotomy w/ active bleeding at common femoral artery bifurcation, repaired with suture and cessation of bleeding.  -S/p 4 prbc, 2 ffp, 1 plt.  -Vascular following
- h/o CABG , AVR, MVR 2020  - h/o MV endocarditis 2023  - s/p leadless PPM for bradycardia  - Troponin 117->113, history of elevated troponin, likely demand in setting of ESRD.  - denies chest pain  - cont asa/statin  - Plan for eventual LHC/RHC
Management per vascular surgery.  Monitor H/H, transfuse PRBC PRN.
TTE this admission revealed newly reduced LV systolic function 35-40%. LHC was attempted on 05/22/25 vi RFA access, access sealed with angio seal,  by Mohinder Colon,  but unable to pass through aortic root in view of prior type A dissection repair. RHC showed mildly elevated filling pressures and increased CO   TTE 5/19/25: LVEF 35-40%, LVIDD 4.6cm, LVOT VTI 15.9cm, moderate to severe LVH, normal RV size/function, well seated normal functioning bioprosthetic aortic valve, well seated normal functioning bioprosthetic mitral valve with trace intravalvular regurgitation, PASP 83 mmHg (RAP 15 mmHg).   Admission proBNP >50K  GDMT as below  -C/W Coreg 6.25 mg po bid with parameters of holding for SBP < 110, HR< 60 BPM  -Continue to hold Entresto Hydralazine PO, torsemide, Imdur in setting of s/p hemorraghic shock  -HF to follow pt on Monday and resumption of GDMT per HF recommendations  Fluid removal with TODAY and follow nephrology recs. for further HD sessions

## 2025-05-30 NOTE — PROVIDER CONTACT NOTE (OTHER) - ASSESSMENT
pt asymptomatic, right groin surgical site clean no active bleeding, mercedez drain level of output same as this morning, not increasing. 2 boluses were given over night due to low bp.
A&Ox4, VSS, pt in severe pain and visible distress with large expanding hematoma from below site of fem stop extending to inner medial thigh.  Prior hgb 9.9
Vital signs in flow sheet

## 2025-05-30 NOTE — PROGRESS NOTE ADULT - PROBLEM SELECTOR PLAN 2
.   - cont home regimen hydralazine, imdur, coreg, amlodipine  - reduce clonidine 0.1 mg BID given BP is now low s/p HD  - monitor BP
IC following, plan is for medical management.  Continue ASA, statin, BB, and nitrate.
.   - cont home regimen hydralazine, imdur, coreg, amlodipine  - reduce clonidine 0.1 mg BID given BP is now low s/p HD  - monitor BP
Plan: LVEF 35-40%, LVIDd 4.6cm, normal RV size/fx  LHC was attempted on 05/22/25 vi RFA access, access sealed with angio seal,  by Mohinder Colon,  but unable to pass through aortic root in view of prior type A dissection repair. RHC showed mildly elevated filling pressures and increased CO    S/p CTA on 05/23 revealed LM: Diffuse significant calcified plaque noted with multiple areas of indeterminate stenoses. LAD: Diffuse significant calcified plaque noted with multiple areas of indeterminate stenoses.  DIAGONAL BRANCHES: Not well visualized. RAMUS INTERMEDIUS: Not visualized.CIRCUMFLEX ARTERY: Diffuse significant calcified plaque noted with multiple areas of indeterminate stenoses.  Obtuse marginal (OM): Not well visualized. RIGHT CORONARY ARTERY: Diffuse significant calcified plaque noted with multiple areas of indeterminate stenoses. .Posterior descending artery (PDA):Not well visualized.Posterolateral branch (PL):  Not visualized.  -Medical management now in setting of s/p rt groin /thigh exploration/ hematoma evacuation and repair of CFA bleeding  -Will hold aspirin for now in setting of  rt groin /thighvascular procedure yesterday  -Restart aspirin 81mg po daily when vascular clear the patient  -Pt  was on  atorvastatin 40mg po daily HS, will restart statin
IC following, plan is for medical management.  Continue ASA, statin, BB, and nitrate.
- cont home regimen hydralazine, imdur, coreg, amlodipine  - reduce clonidine 0.1 mg BID given BP is now low s/p HD   - monitor BP
- as per advanced heart failure service
-LHC was attempted on 05/22, unable to pass through aortic root in view of prior type A dissection repair.   - CT cardiac on 05/23 as above, vessels not well visualized   -Medical management  -Resume ASA, vascular gave OK  -C/w BB, statin (consider maximizing to 80mg). Resume imdur 60mg as tolerated
appears euvolemic    dialysis for fluid management  C/w BB, restart hydralazine 100mg BID (afterload reduction).   c/w Imdur    Cardiac meds  aspirin  chewable 81 milliGRAM(s) Oral daily  atorvastatin 40 milliGRAM(s) Oral at bedtime  carvedilol 6.25 milliGRAM(s) Oral every 12 hours  isosorbide   mononitrate ER Tablet (IMDUR) 60 milliGRAM(s) Oral daily

## 2025-05-30 NOTE — PROVIDER CONTACT NOTE (OTHER) - REASON
low bp
patient had an episode of emesis
5 beats of V tach
re-emerging hematoma expanding past fem stop

## 2025-05-30 NOTE — PROGRESS NOTE ADULT - PROBLEM SELECTOR PROBLEM 2
CAD (coronary artery disease)
CAD (coronary artery disease)
HTN (hypertension)
Acute systolic heart failure
HTN (hypertension)
Acute systolic heart failure
CAD (coronary artery disease)
Chronic atrial fibrillation
HTN (hypertension)
CAD (coronary artery disease)

## 2025-05-31 LAB
ALBUMIN SERPL ELPH-MCNC: 3.1 G/DL — LOW (ref 3.3–5.2)
ALP SERPL-CCNC: 104 U/L — SIGNIFICANT CHANGE UP (ref 40–120)
ALT FLD-CCNC: <5 U/L — SIGNIFICANT CHANGE UP
ANION GAP SERPL CALC-SCNC: 15 MMOL/L — SIGNIFICANT CHANGE UP (ref 5–17)
AST SERPL-CCNC: 23 U/L — SIGNIFICANT CHANGE UP
BASOPHILS # BLD AUTO: 0.02 K/UL — SIGNIFICANT CHANGE UP (ref 0–0.2)
BASOPHILS NFR BLD AUTO: 0.4 % — SIGNIFICANT CHANGE UP (ref 0–2)
BILIRUB SERPL-MCNC: 1.6 MG/DL — SIGNIFICANT CHANGE UP (ref 0.4–2)
BUN SERPL-MCNC: 42.7 MG/DL — HIGH (ref 8–20)
CALCIUM SERPL-MCNC: 8.6 MG/DL — SIGNIFICANT CHANGE UP (ref 8.4–10.5)
CHLORIDE SERPL-SCNC: 98 MMOL/L — SIGNIFICANT CHANGE UP (ref 96–108)
CO2 SERPL-SCNC: 21 MMOL/L — LOW (ref 22–29)
CREAT SERPL-MCNC: 6.33 MG/DL — HIGH (ref 0.5–1.3)
EGFR: 9 ML/MIN/1.73M2 — LOW
EGFR: 9 ML/MIN/1.73M2 — LOW
EOSINOPHIL # BLD AUTO: 0.4 K/UL — SIGNIFICANT CHANGE UP (ref 0–0.5)
EOSINOPHIL NFR BLD AUTO: 8.3 % — HIGH (ref 0–6)
GLUCOSE SERPL-MCNC: 80 MG/DL — SIGNIFICANT CHANGE UP (ref 70–99)
HCT VFR BLD CALC: 26.3 % — LOW (ref 39–50)
HGB BLD-MCNC: 8 G/DL — LOW (ref 13–17)
IMM GRANULOCYTES # BLD AUTO: 0.01 K/UL — SIGNIFICANT CHANGE UP (ref 0–0.07)
IMM GRANULOCYTES NFR BLD AUTO: 0.2 % — SIGNIFICANT CHANGE UP (ref 0–0.9)
LYMPHOCYTES # BLD AUTO: 0.85 K/UL — LOW (ref 1–3.3)
LYMPHOCYTES NFR BLD AUTO: 17.6 % — SIGNIFICANT CHANGE UP (ref 13–44)
MAGNESIUM SERPL-MCNC: 1.9 MG/DL — SIGNIFICANT CHANGE UP (ref 1.6–2.6)
MCHC RBC-ENTMCNC: 30.4 G/DL — LOW (ref 32–36)
MCHC RBC-ENTMCNC: 30.8 PG — SIGNIFICANT CHANGE UP (ref 27–34)
MCV RBC AUTO: 101.2 FL — HIGH (ref 80–100)
MONOCYTES # BLD AUTO: 0.68 K/UL — SIGNIFICANT CHANGE UP (ref 0–0.9)
MONOCYTES NFR BLD AUTO: 14 % — SIGNIFICANT CHANGE UP (ref 2–14)
NEUTROPHILS # BLD AUTO: 2.88 K/UL — SIGNIFICANT CHANGE UP (ref 1.8–7.4)
NEUTROPHILS NFR BLD AUTO: 59.5 % — SIGNIFICANT CHANGE UP (ref 43–77)
NRBC # BLD AUTO: 0.02 K/UL — HIGH (ref 0–0)
NRBC # FLD: 0.02 K/UL — HIGH (ref 0–0)
NRBC BLD AUTO-RTO: 0 /100 WBCS — SIGNIFICANT CHANGE UP (ref 0–0)
PHOSPHATE SERPL-MCNC: 4.4 MG/DL — SIGNIFICANT CHANGE UP (ref 2.4–4.7)
PLATELET # BLD AUTO: 121 K/UL — LOW (ref 150–400)
PMV BLD: 11.3 FL — SIGNIFICANT CHANGE UP (ref 7–13)
POTASSIUM SERPL-MCNC: 4.6 MMOL/L — SIGNIFICANT CHANGE UP (ref 3.5–5.3)
POTASSIUM SERPL-SCNC: 4.6 MMOL/L — SIGNIFICANT CHANGE UP (ref 3.5–5.3)
PROT SERPL-MCNC: 5.9 G/DL — LOW (ref 6.6–8.7)
RBC # BLD: 2.6 M/UL — LOW (ref 4.2–5.8)
RBC # FLD: 16.1 % — HIGH (ref 10.3–14.5)
SODIUM SERPL-SCNC: 134 MMOL/L — LOW (ref 135–145)
WBC # BLD: 4.84 K/UL — SIGNIFICANT CHANGE UP (ref 3.8–10.5)
WBC # FLD AUTO: 4.84 K/UL — SIGNIFICANT CHANGE UP (ref 3.8–10.5)

## 2025-05-31 PROCEDURE — 99233 SBSQ HOSP IP/OBS HIGH 50: CPT

## 2025-05-31 RX ADMIN — Medication 0.2 MILLIGRAM(S): at 01:19

## 2025-05-31 RX ADMIN — Medication 325 MILLIGRAM(S): at 11:36

## 2025-05-31 RX ADMIN — Medication 0.5 MILLIGRAM(S): at 23:15

## 2025-05-31 RX ADMIN — ATORVASTATIN CALCIUM 40 MILLIGRAM(S): 80 TABLET, FILM COATED ORAL at 21:03

## 2025-05-31 RX ADMIN — SACUBITRIL AND VALSARTAN 1 TABLET(S): 49; 51 TABLET, FILM COATED ORAL at 16:59

## 2025-05-31 RX ADMIN — OXYCODONE HYDROCHLORIDE 10 MILLIGRAM(S): 30 TABLET ORAL at 11:40

## 2025-05-31 RX ADMIN — Medication 0.5 MILLIGRAM(S): at 22:47

## 2025-05-31 RX ADMIN — OXYCODONE HYDROCHLORIDE 10 MILLIGRAM(S): 30 TABLET ORAL at 16:59

## 2025-05-31 RX ADMIN — Medication 40 MILLIGRAM(S): at 11:36

## 2025-05-31 RX ADMIN — OXYCODONE HYDROCHLORIDE 10 MILLIGRAM(S): 30 TABLET ORAL at 21:03

## 2025-05-31 RX ADMIN — Medication 667 MILLIGRAM(S): at 08:58

## 2025-05-31 RX ADMIN — LEVETIRACETAM 500 MILLIGRAM(S): 10 INJECTION, SOLUTION INTRAVENOUS at 21:04

## 2025-05-31 RX ADMIN — EPOETIN ALFA 10000 UNIT(S): 10000 SOLUTION INTRAVENOUS; SUBCUTANEOUS at 15:03

## 2025-05-31 RX ADMIN — Medication 667 MILLIGRAM(S): at 16:59

## 2025-05-31 RX ADMIN — Medication 0.2 MILLIGRAM(S): at 01:34

## 2025-05-31 RX ADMIN — Medication 667 MILLIGRAM(S): at 11:36

## 2025-05-31 RX ADMIN — OXYCODONE HYDROCHLORIDE 10 MILLIGRAM(S): 30 TABLET ORAL at 22:00

## 2025-05-31 RX ADMIN — LEVETIRACETAM 500 MILLIGRAM(S): 10 INJECTION, SOLUTION INTRAVENOUS at 08:58

## 2025-05-31 RX ADMIN — Medication 1 APPLICATION(S): at 07:03

## 2025-05-31 RX ADMIN — Medication 81 MILLIGRAM(S): at 11:36

## 2025-05-31 RX ADMIN — FOLIC ACID 1 MILLIGRAM(S): 1 TABLET ORAL at 11:36

## 2025-05-31 RX ADMIN — TAMSULOSIN HYDROCHLORIDE 0.4 MILLIGRAM(S): 0.4 CAPSULE ORAL at 21:03

## 2025-05-31 NOTE — PROGRESS NOTE ADULT - SUBJECTIVE AND OBJECTIVE BOX
NEPHROLOGY INTERVAL HPI/OVERNIGHT EVENTS:  uneventful overnight  clinically stable    MEDICATIONS  (STANDING):  aspirin  chewable 81 milliGRAM(s) Oral daily  atorvastatin 40 milliGRAM(s) Oral at bedtime  calcium acetate 667 milliGRAM(s) Oral three times a day with meals  carvedilol 6.25 milliGRAM(s) Oral every 12 hours  chlorhexidine 2% Cloths 1 Application(s) Topical <User Schedule>  epoetin yolanda-epbx (RETACRIT) Injectable 76443 Unit(s) IV Push <User Schedule>  ferrous    sulfate 325 milliGRAM(s) Oral daily  folic acid 1 milliGRAM(s) Oral daily  isosorbide   mononitrate ER Tablet (IMDUR) 30 milliGRAM(s) Oral daily  levETIRAcetam 500 milliGRAM(s) Oral two times a day  pantoprazole  Injectable 40 milliGRAM(s) IV Push daily  sacubitril 24 mG/valsartan 26 mG 1 Tablet(s) Oral two times a day  tamsulosin 0.4 milliGRAM(s) Oral at bedtime    MEDICATIONS  (PRN):  acetaminophen     Tablet .. 650 milliGRAM(s) Oral every 6 hours PRN Mild Pain (1 - 3)  hydrALAZINE Injectable 10 milliGRAM(s) IV Push every 3 hours PRN sbp>160  HYDROmorphone  Injectable 0.5 milliGRAM(s) IV Push every 4 hours PRN breakthrough pain  ondansetron Injectable 4 milliGRAM(s) IV Push every 8 hours PRN Nausea and/or Vomiting  oxyCODONE    IR 10 milliGRAM(s) Oral every 4 hours PRN Severe Pain (7 - 10)  oxyCODONE    IR 5 milliGRAM(s) Oral every 4 hours PRN Moderate Pain (4 - 6)  sodium chloride 0.9% lock flush 10 milliLiter(s) IV Push every 1 hour PRN Pre/post blood products, medications, blood draw, and to maintain line patency      Allergies    penicillin (Other)        Vital Signs Last 24 Hrs  T(C): 36.8 (31 May 2025 04:47), Max: 36.9 (30 May 2025 16:38)  T(F): 98.3 (31 May 2025 04:47), Max: 98.4 (30 May 2025 16:38)  HR: 65 (31 May 2025 04:47) (59 - 75)  BP: 103/58 (31 May 2025 04:47) (89/38 - 129/63)  BP(mean): 67 (30 May 2025 14:48) (55 - 83)  RR: 18 (31 May 2025 04:47) (18 - 18)  SpO2: 96% (31 May 2025 04:47) (94% - 96%)    Parameters below as of 30 May 2025 16:38  Patient On (Oxygen Delivery Method): room air        PHYSICAL EXAM:  GENERAL: Chronically ill appearing  HEENT: No periorbital edema  NECK: Supple, No JVD  NERVOUS SYSTEM:  A/O x3  CHEST: Clear, diminished BS  HEART:  RRR, no rub  ABDOMEN: Soft, NT/ND BS+  EXTREMITIES: Tr dependent edema; R leg drain      LABS:                        7.9    5.13  )-----------( 105      ( 30 May 2025 06:10 )             25.5     05-30    134[L]  |  96  |  29.0[H]  ----------------------------<  94  4.3   |  27.0  |  5.20[H]    Ca    8.2[L]      30 May 2025 06:10  Phos  3.9     05-30  Mg     1.8     05-30    TPro  5.7[L]  /  Alb  3.2[L]  /  TBili  1.9  /  DBili  x   /  AST  16  /  ALT  <5  /  AlkPhos  95  05-30      Urinalysis Basic - ( 30 May 2025 06:10 )    Color: x / Appearance: x / SG: x / pH: x  Gluc: 94 mg/dL / Ketone: x  / Bili: x / Urobili: x   Blood: x / Protein: x / Nitrite: x   Leuk Esterase: x / RBC: x / WBC x   Sq Epi: x / Non Sq Epi: x / Bacteria: x          RADIOLOGY & ADDITIONAL TESTS:  < from: CT Abdomen and Pelvis No Cont (05.29.25 @ 19:26) >  ACC: [id number] EXAM:  CT ABDOMEN AND PELVIS   ORDERED BY: ARIC STEIN     PROCEDURE DATE:  05/29/2025          INTERPRETATION:  CLINICAL INFORMATION: 70-year-old male with a hematoma   which appears to be getting larger    COMPARISON: May 23, 2025    CONTRAST/COMPLICATIONS:  IV Contrast: NONE  Oral Contrast: NONE  .    PROCEDURE:  CT of the Abdomen and Pelvis was performed.  Sagittal and coronal reformats were performed.    FINDINGS:    Lack of intravenous contrast limits evaluation of the abdominal/pelvic   viscera. Lack of oral contrast limits evaluation of the bowel.    LOWER CHEST: Coronary artery calcifications. Dense mitral valve annular   calcifications. Left lower lobe atelectasis.    LIVER: Within normal limits.  BILE DUCTS: Normal caliber.  GALLBLADDER: Cholelithiasis.  SPLEEN: Within normal limits.  PANCREAS: Within normal limits.  ADRENALS: Within normal limits.  KIDNEYS/URETERS: Within normal limits. Bilateral renal cysts.    BLADDER: Minimally distended.  REPRODUCTIVE ORGANS: Prostate within normal limits.    BOWEL: No bowel obstruction.  PERITONEUM/RETROPERITONEUM: Within normal limits.  VESSELS: There is a calcified chronic aortic dissection, calcified   atherosclerosis of the iliac arteries.  LYMPH NODES: No lymphadenopathy.  ABDOMINAL WALL: Within normal limits.  BONES: Degenerative changes.    There is asymmetric swelling of the right thigh soft tissues. A drain is   seen entering from the anterior upper thigh, residing in the medial soft   tissues of the thigh. The previously seen hematoma is much smaller. A   focal component of the hematoma anteriorly in the right thigh measures   3.9 x 3.5 cm and contains bubbles of air.    IMPRESSION:    The right thigh hematoma is much smaller on today's study than it was on   the study from March 23, 2025.    Air within the hematoma may be iatrogenic or related to   evacuation/presence of the drain.  Infection cannot be excluded,   correlate clinically.    < end of copied text >

## 2025-05-31 NOTE — PROGRESS NOTE ADULT - ASSESSMENT
65 y/o M w/ PMH of ESRD s/p renal transplant on HD Tu/Th/Sat, type A aortic dissection s/p repair (2010), ischemic bowel resection, CAD s/p CABG x 2 (SVG-OM1and SVG-RPDA), bioprosthetic AVR/MVR (2020 w/ Dr. Butler), AF s/p ablation (2021) and LAAO device (8/2024), MV endocarditis (2023), seizure disorder, BPH, remote hx of substance abuse and former smoker initially presented to [facility] c/o SOB and was admitted 5/19/25 to medicine for new acute CHF exacerbation.  TTE on 5/19/25 was significant for newly reduced LV EF 35-40%.  Pt underwent RHC/LHC on 5/22/25 via RFA.  RHC showed mildly elevated filling pressures and increased CO.  During LHC was unable to pass through aortic root due to prior type A dissection repair.  Hospital course complicated by pt developing Rt groin hematoma on 05/23 noted in AM.  Cardio/cath lab called.  Initially manual pressure applied and fem stop placed but hematoma reoccurred/expanded refractory to fem stop.  Vascular was consulted and stat CTA RLE was ordered which showed a massive hematoma and pt was urgently taken to OR for exploration/ evacuation of  RLE hematoma,  Pt received 1 unit PRBC pre OR but during OR pt went into hemorrhagic shock and MTP was initiated and pt was subsequently given another 4u pRBC, 2u FFP and 1u plt in OR.  Pt s/p open cut-down and evacuation of 1L of fresh blood, femoral artery behind profunda vein was ligated and transected, 3mm arteriotomy w/ active bleeding at common femoral artery bifurcation repaired with suture, bleeding stopped and Rt groin DEDRICK drain placed.  Given pt received significant amount of blood products pre/intra-op decision was made to leave pt intubated for resp failure 2/2 vol overload from CHF but did not require pressor support.  dowmngraded from micu 5/24, vascular / IC monitoring RLE hematoma, no current intervention, adjusting HF medications with HF team assistance.     #Hemorrhagic shock 2/2 rt groin hematoma s/p LHC VIA RFA   - No pressors required but did undergo MTP intra-op for hemorrhagic shock  - Pt s/p total of 6/2/1 prbc/ffp/plt during hospital course    - 5/23 s/p open cut-down and evacuation of 1L of fresh blood, femoral artery behind profunda vein was ligated and transected, 3mm arteriotomy w/ active bleeding at common femoral artery bifurcation, repaired with suture and cessation of bleeding  - Pt hemodynamically stable w/ RLE warm, palpable DP/PT noted and dressing in place that has minimal amount of dry blood appreciated   - Rt groin DEDRICK drain in place w/ small amount of blood at this time    - Maintained intubated s/p OR for concern for impending resp failure 2/2 vol overload given amount of blood products transfused in short time frame in setting of rEF   - pt successfully extubated AM of 5/24 and currently satting well ra  - Continue to trend H/h keep type and screen active  - Vascular surgery consult following, asa   - dc'd hep sub q  - epo during hd    #acute decompensated HFrEF likely 2/2 CAD   - Clinically euvolemic at this time and satting well ORA  - TTE 5/19/25: LVEF 35-40%, mod to severe LVH, normal RV size/function, well seated normal functioning bioprosthetic AV, well seated normal functioning bioprosthetic MV w/ trace intravalvular regurgitation, PASP 83 mmHg (RAP 15 mmHg)   - s/p RCH 5/22 showed mildly elevated filling pressures and increased CO  - s/p LHC 5/22 was attempted via RFA access but unable to pass through aortic root in view of prior type A dissection repair   - s/p CTA 05/23 revealed LM, LAD, RCA and circumflex were all reported to have diffuse significant calcified plaque noted w/ multiple areas of indeterminate stenoses.  Diagonal branches, OM, PDA, posterior branch and ramus intermedius were not well visualized.    - c/w statin   -  Imdur, Entresto, Coreg continued at this time with hold parameters   hydralazine held   - hd tomorrow    #Chronic HF  - s/p watchman procedure  - Not on AC  - c/w coreg     #HTN / HLD   - Continue holding Hydralazine PO, torsemide,   - Imdur and entresto continued, monitor for hypotension     #End stage renal disease  - c/w HD as per nephro   - Nephro following     #Seizure disorder  - c/w keppra     #BPH  - c/w flomax    VTE ppx:  hep sub q (on hold)  Dispo: still acute, pending improvement in pain / removal of DEDRICK drain / HF / vascular clearance, expect eventual DC SEAN vs home.

## 2025-05-31 NOTE — PROGRESS NOTE ADULT - SUBJECTIVE AND OBJECTIVE BOX
[facility] Division of Hospital Medicine    Chief Complaint:      SUBJECTIVE / OVERNIGHT EVENTS:    Patient seen and examined at bedside, no acute events overnight, patient denies any new complaints. Continues to endorse RLE pain at hematoma site, DEDRICK drain in place continues to drain. Continue pain regiment / Oxycodone with Dilaudid for breakthrough, patient states Dilaudid helping, oxycodone not helping as much, will continue current regiment, BP stable on lower dose of therapy.     MEDICATIONS  (STANDING):  aspirin  chewable 81 milliGRAM(s) Oral daily  atorvastatin 40 milliGRAM(s) Oral at bedtime  calcium acetate 667 milliGRAM(s) Oral three times a day with meals  carvedilol 6.25 milliGRAM(s) Oral every 12 hours  chlorhexidine 2% Cloths 1 Application(s) Topical <User Schedule>  epoetin yolanda-epbx (RETACRIT) Injectable 49472 Unit(s) IV Push <User Schedule>  ferrous    sulfate 325 milliGRAM(s) Oral daily  folic acid 1 milliGRAM(s) Oral daily  isosorbide   mononitrate ER Tablet (IMDUR) 30 milliGRAM(s) Oral daily  levETIRAcetam 500 milliGRAM(s) Oral two times a day  pantoprazole  Injectable 40 milliGRAM(s) IV Push daily  sacubitril 24 mG/valsartan 26 mG 1 Tablet(s) Oral two times a day  tamsulosin 0.4 milliGRAM(s) Oral at bedtime    MEDICATIONS  (PRN):  acetaminophen     Tablet .. 650 milliGRAM(s) Oral every 6 hours PRN Mild Pain (1 - 3)  hydrALAZINE Injectable 10 milliGRAM(s) IV Push every 3 hours PRN sbp>160  HYDROmorphone  Injectable 0.5 milliGRAM(s) IV Push every 4 hours PRN breakthrough pain  ondansetron Injectable 4 milliGRAM(s) IV Push every 8 hours PRN Nausea and/or Vomiting  oxyCODONE    IR 10 milliGRAM(s) Oral every 4 hours PRN Severe Pain (7 - 10)  oxyCODONE    IR 5 milliGRAM(s) Oral every 4 hours PRN Moderate Pain (4 - 6)  sodium chloride 0.9% lock flush 10 milliLiter(s) IV Push every 1 hour PRN Pre/post blood products, medications, blood draw, and to maintain line patency        I&O's Summary    30 May 2025 07:01  -  31 May 2025 07:00  --------------------------------------------------------  IN: 0 mL / OUT: 50 mL / NET: -50 mL        PHYSICAL EXAM:  Vital Signs Last 24 Hrs  T(C): 36.6 (31 May 2025 11:14), Max: 36.9 (30 May 2025 16:38)  T(F): 97.8 (31 May 2025 11:14), Max: 98.4 (30 May 2025 16:38)  HR: 63 (31 May 2025 11:14) (61 - 66)  BP: 138/65 (31 May 2025 11:14) (98/47 - 138/65)  BP(mean): 67 (30 May 2025 14:48) (67 - 67)  RR: 18 (31 May 2025 11:14) (18 - 18)  SpO2: 97% (31 May 2025 11:14) (94% - 97%)    Parameters below as of 30 May 2025 16:38  Patient On (Oxygen Delivery Method): room air      GENERAL: pt examined bedside, laying comfortably in bed in NAD  HEENT: NC/AT, moist oral mucosa  RESPIRATORY: CTABL   CARDIOVASCULAR: irregularly irregular, normal S1 and S2, +murmurs  ABDOMEN: soft, NT/ND, normoactive bowel sounds, no rebound/guarding  EXTREMITIES: No cynaosis, no clubbing, groin hematoma slightly enlarged, significantly tender in RLE with DEDRICK drain in place   NEUROLOGY: A+O to person, place, and time, no focal neurologic deficits appreciated   SKIN: pallor, Rt groin hematoma w/ dressing, drain    LABS:                        8.0    4.84  )-----------( 121      ( 31 May 2025 07:00 )             26.3     05-31    134[L]  |  98  |  42.7[H]  ----------------------------<  80  4.6   |  21.0[L]  |  6.33[H]    Ca    8.6      31 May 2025 07:00  Phos  4.4     05-31  Mg     1.9     05-31    TPro  5.9[L]  /  Alb  3.1[L]  /  TBili  1.6  /  DBili  x   /  AST  23  /  ALT  <5  /  AlkPhos  104  05-31          Urinalysis Basic - ( 31 May 2025 07:00 )    Color: x / Appearance: x / SG: x / pH: x  Gluc: 80 mg/dL / Ketone: x  / Bili: x / Urobili: x   Blood: x / Protein: x / Nitrite: x   Leuk Esterase: x / RBC: x / WBC x   Sq Epi: x / Non Sq Epi: x / Bacteria: x        CAPILLARY BLOOD GLUCOSE            RADIOLOGY & ADDITIONAL TESTS:  Results Reviewed:   Imaging Personally Reviewed:  Electrocardiogram Personally Reviewed:

## 2025-05-31 NOTE — PROGRESS NOTE ADULT - ASSESSMENT
ESRD:  - HD today  - UF as tolerates    Anemia: + CKD + thigh hematoma--> decreased in size  - cont REINALDO  - further PRBCs as needed  - target Hgb > 10.0  - surgical f/u    RO:  - low phos diet  - cont Phoslo

## 2025-06-01 LAB
ANION GAP SERPL CALC-SCNC: 13 MMOL/L — SIGNIFICANT CHANGE UP (ref 5–17)
BUN SERPL-MCNC: 24.6 MG/DL — HIGH (ref 8–20)
CALCIUM SERPL-MCNC: 8.5 MG/DL — SIGNIFICANT CHANGE UP (ref 8.4–10.5)
CHLORIDE SERPL-SCNC: 99 MMOL/L — SIGNIFICANT CHANGE UP (ref 96–108)
CO2 SERPL-SCNC: 28 MMOL/L — SIGNIFICANT CHANGE UP (ref 22–29)
CREAT SERPL-MCNC: 4.66 MG/DL — HIGH (ref 0.5–1.3)
EGFR: 13 ML/MIN/1.73M2 — LOW
EGFR: 13 ML/MIN/1.73M2 — LOW
GLUCOSE SERPL-MCNC: 89 MG/DL — SIGNIFICANT CHANGE UP (ref 70–99)
HCT VFR BLD CALC: 27.3 % — LOW (ref 39–50)
HGB BLD-MCNC: 8.5 G/DL — LOW (ref 13–17)
MCHC RBC-ENTMCNC: 30.9 PG — SIGNIFICANT CHANGE UP (ref 27–34)
MCHC RBC-ENTMCNC: 31.1 G/DL — LOW (ref 32–36)
MCV RBC AUTO: 99.3 FL — SIGNIFICANT CHANGE UP (ref 80–100)
NRBC # BLD AUTO: 0 K/UL — SIGNIFICANT CHANGE UP (ref 0–0)
NRBC # FLD: 0 K/UL — SIGNIFICANT CHANGE UP (ref 0–0)
NRBC BLD AUTO-RTO: 0 /100 WBCS — SIGNIFICANT CHANGE UP (ref 0–0)
PLATELET # BLD AUTO: 148 K/UL — LOW (ref 150–400)
PMV BLD: 10.5 FL — SIGNIFICANT CHANGE UP (ref 7–13)
POTASSIUM SERPL-MCNC: 4.1 MMOL/L — SIGNIFICANT CHANGE UP (ref 3.5–5.3)
POTASSIUM SERPL-SCNC: 4.1 MMOL/L — SIGNIFICANT CHANGE UP (ref 3.5–5.3)
RBC # BLD: 2.75 M/UL — LOW (ref 4.2–5.8)
RBC # FLD: 16.4 % — HIGH (ref 10.3–14.5)
SODIUM SERPL-SCNC: 139 MMOL/L — SIGNIFICANT CHANGE UP (ref 135–145)
WBC # BLD: 4.99 K/UL — SIGNIFICANT CHANGE UP (ref 3.8–10.5)
WBC # FLD AUTO: 4.99 K/UL — SIGNIFICANT CHANGE UP (ref 3.8–10.5)

## 2025-06-01 PROCEDURE — 99233 SBSQ HOSP IP/OBS HIGH 50: CPT

## 2025-06-01 RX ADMIN — Medication 0.5 MILLIGRAM(S): at 19:57

## 2025-06-01 RX ADMIN — OXYCODONE HYDROCHLORIDE 10 MILLIGRAM(S): 30 TABLET ORAL at 06:40

## 2025-06-01 RX ADMIN — Medication 0.5 MILLIGRAM(S): at 20:57

## 2025-06-01 RX ADMIN — Medication 667 MILLIGRAM(S): at 17:20

## 2025-06-01 RX ADMIN — Medication 0.5 MILLIGRAM(S): at 04:40

## 2025-06-01 RX ADMIN — LEVETIRACETAM 500 MILLIGRAM(S): 10 INJECTION, SOLUTION INTRAVENOUS at 08:47

## 2025-06-01 RX ADMIN — OXYCODONE HYDROCHLORIDE 10 MILLIGRAM(S): 30 TABLET ORAL at 01:15

## 2025-06-01 RX ADMIN — OXYCODONE HYDROCHLORIDE 10 MILLIGRAM(S): 30 TABLET ORAL at 22:21

## 2025-06-01 RX ADMIN — CARVEDILOL 6.25 MILLIGRAM(S): 3.12 TABLET, FILM COATED ORAL at 19:58

## 2025-06-01 RX ADMIN — Medication 667 MILLIGRAM(S): at 11:35

## 2025-06-01 RX ADMIN — Medication 667 MILLIGRAM(S): at 08:47

## 2025-06-01 RX ADMIN — Medication 325 MILLIGRAM(S): at 11:35

## 2025-06-01 RX ADMIN — LEVETIRACETAM 500 MILLIGRAM(S): 10 INJECTION, SOLUTION INTRAVENOUS at 19:58

## 2025-06-01 RX ADMIN — SACUBITRIL AND VALSARTAN 1 TABLET(S): 49; 51 TABLET, FILM COATED ORAL at 17:20

## 2025-06-01 RX ADMIN — ATORVASTATIN CALCIUM 40 MILLIGRAM(S): 80 TABLET, FILM COATED ORAL at 19:58

## 2025-06-01 RX ADMIN — OXYCODONE HYDROCHLORIDE 10 MILLIGRAM(S): 30 TABLET ORAL at 02:00

## 2025-06-01 RX ADMIN — Medication 1 APPLICATION(S): at 05:39

## 2025-06-01 RX ADMIN — SACUBITRIL AND VALSARTAN 1 TABLET(S): 49; 51 TABLET, FILM COATED ORAL at 05:39

## 2025-06-01 RX ADMIN — TAMSULOSIN HYDROCHLORIDE 0.4 MILLIGRAM(S): 0.4 CAPSULE ORAL at 19:58

## 2025-06-01 RX ADMIN — Medication 81 MILLIGRAM(S): at 11:35

## 2025-06-01 RX ADMIN — CARVEDILOL 6.25 MILLIGRAM(S): 3.12 TABLET, FILM COATED ORAL at 08:47

## 2025-06-01 RX ADMIN — Medication 0.5 MILLIGRAM(S): at 08:54

## 2025-06-01 RX ADMIN — Medication 0.5 MILLIGRAM(S): at 04:08

## 2025-06-01 RX ADMIN — Medication 0.5 MILLIGRAM(S): at 23:59

## 2025-06-01 RX ADMIN — OXYCODONE HYDROCHLORIDE 10 MILLIGRAM(S): 30 TABLET ORAL at 21:21

## 2025-06-01 RX ADMIN — Medication 0.5 MILLIGRAM(S): at 13:50

## 2025-06-01 RX ADMIN — Medication 40 MILLIGRAM(S): at 11:36

## 2025-06-01 RX ADMIN — ISOSORBIDE MONONITRATE 30 MILLIGRAM(S): 60 TABLET, EXTENDED RELEASE ORAL at 11:36

## 2025-06-01 RX ADMIN — OXYCODONE HYDROCHLORIDE 10 MILLIGRAM(S): 30 TABLET ORAL at 11:36

## 2025-06-01 RX ADMIN — FOLIC ACID 1 MILLIGRAM(S): 1 TABLET ORAL at 11:36

## 2025-06-01 RX ADMIN — OXYCODONE HYDROCHLORIDE 10 MILLIGRAM(S): 30 TABLET ORAL at 12:30

## 2025-06-01 RX ADMIN — Medication 0.5 MILLIGRAM(S): at 12:59

## 2025-06-01 RX ADMIN — OXYCODONE HYDROCHLORIDE 10 MILLIGRAM(S): 30 TABLET ORAL at 17:20

## 2025-06-01 RX ADMIN — OXYCODONE HYDROCHLORIDE 10 MILLIGRAM(S): 30 TABLET ORAL at 05:48

## 2025-06-01 RX ADMIN — Medication 0.5 MILLIGRAM(S): at 09:50

## 2025-06-01 NOTE — PROGRESS NOTE ADULT - ASSESSMENT
ESRD:  - HD TTS    Anemia: + CKD + thigh hematoma  Repeat CT scan show resolving hematoma  - cont REINALDO  - further PRBCs as needed  - target Hgb > 10.0  - surgical f/u    RO:  - low phos diet  - cont Phoslo  - trend phos

## 2025-06-01 NOTE — PROGRESS NOTE ADULT - SUBJECTIVE AND OBJECTIVE BOX
[facility] Division of Hospital Medicine    Chief Complaint:      SUBJECTIVE / OVERNIGHT EVENTS:    Patient seen and examined at bedside, no acute events overnight, patient continues to endorse pain in R. thigh hematoma, states he feels as if it may have gotten larger, discussed with vascular team, state will evaluate patient tomorrow, no acute imaging requested at this time. dressing / surgical site intact without bleeding / non tense currently, if pain continues to worsen / bleeding noted will order repeat imaging prior to vascular re evaluation.   Continue pain control as ordered.     MEDICATIONS  (STANDING):  aspirin  chewable 81 milliGRAM(s) Oral daily  atorvastatin 40 milliGRAM(s) Oral at bedtime  calcium acetate 667 milliGRAM(s) Oral three times a day with meals  carvedilol 6.25 milliGRAM(s) Oral every 12 hours  chlorhexidine 2% Cloths 1 Application(s) Topical <User Schedule>  epoetin yolanda-epbx (RETACRIT) Injectable 39942 Unit(s) IV Push <User Schedule>  ferrous    sulfate 325 milliGRAM(s) Oral daily  folic acid 1 milliGRAM(s) Oral daily  isosorbide   mononitrate ER Tablet (IMDUR) 30 milliGRAM(s) Oral daily  levETIRAcetam 500 milliGRAM(s) Oral two times a day  pantoprazole  Injectable 40 milliGRAM(s) IV Push daily  sacubitril 24 mG/valsartan 26 mG 1 Tablet(s) Oral two times a day  tamsulosin 0.4 milliGRAM(s) Oral at bedtime    MEDICATIONS  (PRN):  acetaminophen     Tablet .. 650 milliGRAM(s) Oral every 6 hours PRN Mild Pain (1 - 3)  hydrALAZINE Injectable 10 milliGRAM(s) IV Push every 3 hours PRN sbp>160  HYDROmorphone  Injectable 0.5 milliGRAM(s) IV Push every 4 hours PRN breakthrough pain  ondansetron Injectable 4 milliGRAM(s) IV Push every 8 hours PRN Nausea and/or Vomiting  oxyCODONE    IR 10 milliGRAM(s) Oral every 4 hours PRN Severe Pain (7 - 10)  oxyCODONE    IR 5 milliGRAM(s) Oral every 4 hours PRN Moderate Pain (4 - 6)  sodium chloride 0.9% lock flush 10 milliLiter(s) IV Push every 1 hour PRN Pre/post blood products, medications, blood draw, and to maintain line patency        I&O's Summary    31 May 2025 07:01  -  01 Jun 2025 07:00  --------------------------------------------------------  IN: 1200 mL / OUT: 1880 mL / NET: -680 mL        PHYSICAL EXAM:  Vital Signs Last 24 Hrs  T(C): 36.6 (01 Jun 2025 04:12), Max: 36.8 (31 May 2025 15:35)  T(F): 97.8 (01 Jun 2025 04:12), Max: 98.3 (31 May 2025 15:35)  HR: 62 (01 Jun 2025 11:33) (62 - 78)  BP: 131/45 (01 Jun 2025 11:33) (105/58 - 146/61)  BP(mean): 87 (31 May 2025 16:50) (87 - 87)  RR: 16 (01 Jun 2025 08:44) (16 - 19)  SpO2: 94% (01 Jun 2025 04:12) (94% - 97%)    Parameters below as of 31 May 2025 20:43  Patient On (Oxygen Delivery Method): room air      GENERAL: pt examined bedside, laying comfortably in bed in NAD  HEENT: NC/AT, moist oral mucosa  RESPIRATORY: CTABL   CARDIOVASCULAR: irregularly irregular, normal S1 and S2, +murmurs  ABDOMEN: soft, NT/ND, normoactive bowel sounds, no rebound/guarding  EXTREMITIES: No cynaosis, no clubbing, groin hematoma slightly enlarged, significantly tender in RLE with DEDRICK drain in place   NEUROLOGY: A+O to person, place, and time, no focal neurologic deficits appreciated   SKIN: pallor, Rt groin hematoma w/ dressing, drain    LABS:                        8.5    4.99  )-----------( 148      ( 01 Jun 2025 04:36 )             27.3     06-01    139  |  99  |  24.6[H]  ----------------------------<  89  4.1   |  28.0  |  4.66[H]    Ca    8.5      01 Jun 2025 04:36  Phos  4.4     05-31  Mg     1.9     05-31    TPro  5.9[L]  /  Alb  3.1[L]  /  TBili  1.6  /  DBili  x   /  AST  23  /  ALT  <5  /  AlkPhos  104  05-31          Urinalysis Basic - ( 01 Jun 2025 04:36 )    Color: x / Appearance: x / SG: x / pH: x  Gluc: 89 mg/dL / Ketone: x  / Bili: x / Urobili: x   Blood: x / Protein: x / Nitrite: x   Leuk Esterase: x / RBC: x / WBC x   Sq Epi: x / Non Sq Epi: x / Bacteria: x        CAPILLARY BLOOD GLUCOSE            RADIOLOGY & ADDITIONAL TESTS:  Results Reviewed:   Imaging Personally Reviewed:  Electrocardiogram Personally Reviewed:

## 2025-06-01 NOTE — PROGRESS NOTE ADULT - ASSESSMENT
63 y/o M w/ PMH of ESRD s/p renal transplant on HD Tu/Th/Sat, type A aortic dissection s/p repair (2010), ischemic bowel resection, CAD s/p CABG x 2 (SVG-OM1and SVG-RPDA), bioprosthetic AVR/MVR (2020 w/ Dr. Butler), AF s/p ablation (2021) and LAAO device (8/2024), MV endocarditis (2023), seizure disorder, BPH, remote hx of substance abuse and former smoker initially presented to [facility] c/o SOB and was admitted 5/19/25 to medicine for new acute CHF exacerbation.  TTE on 5/19/25 was significant for newly reduced LV EF 35-40%.  Pt underwent RHC/LHC on 5/22/25 via RFA.  RHC showed mildly elevated filling pressures and increased CO.  During LHC was unable to pass through aortic root due to prior type A dissection repair.  Hospital course complicated by pt developing Rt groin hematoma on 05/23 noted in AM.  Cardio/cath lab called.  Initially manual pressure applied and fem stop placed but hematoma reoccurred/expanded refractory to fem stop.  Vascular was consulted and stat CTA RLE was ordered which showed a massive hematoma and pt was urgently taken to OR for exploration/ evacuation of  RLE hematoma,  Pt received 1 unit PRBC pre OR but during OR pt went into hemorrhagic shock and MTP was initiated and pt was subsequently given another 4u pRBC, 2u FFP and 1u plt in OR.  Pt s/p open cut-down and evacuation of 1L of fresh blood, femoral artery behind profunda vein was ligated and transected, 3mm arteriotomy w/ active bleeding at common femoral artery bifurcation repaired with suture, bleeding stopped and Rt groin DEDRICK drain placed.  Given pt received significant amount of blood products pre/intra-op decision was made to leave pt intubated for resp failure 2/2 vol overload from CHF but did not require pressor support.  dowmngraded from micu 5/24, vascular / IC monitoring RLE hematoma, no current intervention, adjusting HF medications with HF team assistance.     #Hemorrhagic shock 2/2 rt groin hematoma s/p LHC VIA RFA   - No pressors required but did undergo MTP intra-op for hemorrhagic shock  - Pt s/p total of 6/2/1 prbc/ffp/plt during hospital course    - 5/23 s/p open cut-down and evacuation of 1L of fresh blood, femoral artery behind profunda vein was ligated and transected, 3mm arteriotomy w/ active bleeding at common femoral artery bifurcation, repaired with suture and cessation of bleeding  - Pt hemodynamically stable w/ RLE warm, palpable DP/PT noted and dressing in place that has minimal amount of dry blood appreciated   - Rt groin DEDRICK drain in place w/ increased OP overnight per patient,  drained in AM   - Maintained intubated s/p OR for concern for impending resp failure 2/2 vol overload given amount of blood products transfused in short time frame in setting of rEF   - pt successfully extubated AM of 5/24 and currently satting well ra  - Continue to trend H/h keep type and screen active  - Vascular surgery consult following   - dc'd hep sub q  - epo during hd    #acute decompensated HFrEF likely 2/2 CAD   - Clinically euvolemic at this time and satting well ORA  - TTE 5/19/25: LVEF 35-40%, mod to severe LVH, normal RV size/function, well seated normal functioning bioprosthetic AV, well seated normal functioning bioprosthetic MV w/ trace intravalvular regurgitation, PASP 83 mmHg (RAP 15 mmHg)   - s/p RCH 5/22 showed mildly elevated filling pressures and increased CO  - s/p LHC 5/22 was attempted via RFA access but unable to pass through aortic root in view of prior type A dissection repair   - s/p CTA 05/23 revealed LM, LAD, RCA and circumflex were all reported to have diffuse significant calcified plaque noted w/ multiple areas of indeterminate stenoses.  Diagonal branches, OM, PDA, posterior branch and ramus intermedius were not well visualized.    - c/w statin   -  Imdur, Entresto, Coreg continued at this time with hold parameters   hydralazine held   - hd performed 5/31, continue HD as per nephro     #Chronic HF  - s/p watchman procedure  - Not on AC  - c/w coreg     #HTN / HLD   - Continue holding Hydralazine PO, torsemide,   - Imdur and entresto continued, monitor for hypotension     #End stage renal disease  - c/w HD as per nephro   - Nephro following     #Seizure disorder  - c/w keppra     #BPH  - c/w flomax    VTE ppx:  hep sub q (on hold)  Dispo: still acute, pending improvement in pain / removal of DEDRICK drain / HF / vascular clearance, expect eventual DC [facility] vs home.

## 2025-06-01 NOTE — PROGRESS NOTE ADULT - SUBJECTIVE AND OBJECTIVE BOX
NEPHROLOGY INTERVAL HPI/OVERNIGHT EVENTS:  relatively comfortable  still some R leg pain  tolerated HD yesterday    MEDICATIONS  (STANDING):  aspirin  chewable 81 milliGRAM(s) Oral daily  atorvastatin 40 milliGRAM(s) Oral at bedtime  calcium acetate 667 milliGRAM(s) Oral three times a day with meals  carvedilol 6.25 milliGRAM(s) Oral every 12 hours  chlorhexidine 2% Cloths 1 Application(s) Topical <User Schedule>  epoetin yolanda-epbx (RETACRIT) Injectable 86556 Unit(s) IV Push <User Schedule>  ferrous    sulfate 325 milliGRAM(s) Oral daily  folic acid 1 milliGRAM(s) Oral daily  isosorbide   mononitrate ER Tablet (IMDUR) 30 milliGRAM(s) Oral daily  levETIRAcetam 500 milliGRAM(s) Oral two times a day  pantoprazole  Injectable 40 milliGRAM(s) IV Push daily  sacubitril 24 mG/valsartan 26 mG 1 Tablet(s) Oral two times a day  tamsulosin 0.4 milliGRAM(s) Oral at bedtime    MEDICATIONS  (PRN):  acetaminophen     Tablet .. 650 milliGRAM(s) Oral every 6 hours PRN Mild Pain (1 - 3)  hydrALAZINE Injectable 10 milliGRAM(s) IV Push every 3 hours PRN sbp>160  HYDROmorphone  Injectable 0.5 milliGRAM(s) IV Push every 4 hours PRN breakthrough pain  ondansetron Injectable 4 milliGRAM(s) IV Push every 8 hours PRN Nausea and/or Vomiting  oxyCODONE    IR 10 milliGRAM(s) Oral every 4 hours PRN Severe Pain (7 - 10)  oxyCODONE    IR 5 milliGRAM(s) Oral every 4 hours PRN Moderate Pain (4 - 6)  sodium chloride 0.9% lock flush 10 milliLiter(s) IV Push every 1 hour PRN Pre/post blood products, medications, blood draw, and to maintain line patency      Allergies    penicillin (Other)          Vital Signs Last 24 Hrs  T(C): 36.6 (01 Jun 2025 04:12), Max: 36.8 (31 May 2025 15:35)  T(F): 97.8 (01 Jun 2025 04:12), Max: 98.3 (31 May 2025 15:35)  HR: 65 (01 Jun 2025 04:12) (58 - 78)  BP: 138/66 (01 Jun 2025 04:12) (105/58 - 146/47)  BP(mean): 87 (31 May 2025 16:50) (87 - 87)  RR: 18 (01 Jun 2025 04:12) (17 - 19)  SpO2: 94% (01 Jun 2025 04:12) (94% - 98%)    Parameters below as of 31 May 2025 20:43  Patient On (Oxygen Delivery Method): room air        PHYSICAL EXAM:  GENERAL: Weak, deconditioned  HEENT: No periorbital edema  NECK: Supple, No JVD  NERVOUS SYSTEM:  A/O x3  CHEST: Clear, diminished BS  HEART:  RRR, no rub  ABDOMEN: Soft, NT/ND BS+  EXTREMITIES: Tr dependent edema; R leg drain; diffuse muscle atrophy  LABS:                        8.5    4.99  )-----------( 148      ( 01 Jun 2025 04:36 )             27.3     05-31    134[L]  |  98  |  42.7[H]  ----------------------------<  80  4.6   |  21.0[L]  |  6.33[H]    Ca    8.6      31 May 2025 07:00  Phos  4.4     05-31  Mg     1.9     05-31    TPro  5.9[L]  /  Alb  3.1[L]  /  TBili  1.6  /  DBili  x   /  AST  23  /  ALT  <5  /  AlkPhos  104  05-31      Urinalysis Basic - ( 31 May 2025 07:00 )    Color: x / Appearance: x / SG: x / pH: x  Gluc: 80 mg/dL / Ketone: x  / Bili: x / Urobili: x   Blood: x / Protein: x / Nitrite: x   Leuk Esterase: x / RBC: x / WBC x   Sq Epi: x / Non Sq Epi: x / Bacteria: x      Magnesium: 1.9 mg/dL (05-31 @ 07:00)  Phosphorus: 4.4 mg/dL (05-31 @ 07:00)      RADIOLOGY & ADDITIONAL TESTS:

## 2025-06-02 LAB
ALBUMIN SERPL ELPH-MCNC: 3.5 G/DL — SIGNIFICANT CHANGE UP (ref 3.3–5.2)
ALP SERPL-CCNC: 119 U/L — SIGNIFICANT CHANGE UP (ref 40–120)
ALT FLD-CCNC: 7 U/L — SIGNIFICANT CHANGE UP
ANION GAP SERPL CALC-SCNC: 15 MMOL/L — SIGNIFICANT CHANGE UP (ref 5–17)
AST SERPL-CCNC: 19 U/L — SIGNIFICANT CHANGE UP
BASOPHILS # BLD AUTO: 0.04 K/UL — SIGNIFICANT CHANGE UP (ref 0–0.2)
BASOPHILS NFR BLD AUTO: 0.7 % — SIGNIFICANT CHANGE UP (ref 0–2)
BILIRUB SERPL-MCNC: 2 MG/DL — SIGNIFICANT CHANGE UP (ref 0.4–2)
BUN SERPL-MCNC: 38.1 MG/DL — HIGH (ref 8–20)
CALCIUM SERPL-MCNC: 8.8 MG/DL — SIGNIFICANT CHANGE UP (ref 8.4–10.5)
CHLORIDE SERPL-SCNC: 98 MMOL/L — SIGNIFICANT CHANGE UP (ref 96–108)
CO2 SERPL-SCNC: 27 MMOL/L — SIGNIFICANT CHANGE UP (ref 22–29)
CREAT SERPL-MCNC: 6.52 MG/DL — HIGH (ref 0.5–1.3)
EGFR: 9 ML/MIN/1.73M2 — LOW
EGFR: 9 ML/MIN/1.73M2 — LOW
EOSINOPHIL # BLD AUTO: 0.5 K/UL — SIGNIFICANT CHANGE UP (ref 0–0.5)
EOSINOPHIL NFR BLD AUTO: 9 % — HIGH (ref 0–6)
GLUCOSE SERPL-MCNC: 93 MG/DL — SIGNIFICANT CHANGE UP (ref 70–99)
HCT VFR BLD CALC: 26.7 % — LOW (ref 39–50)
HGB BLD-MCNC: 8.2 G/DL — LOW (ref 13–17)
IMM GRANULOCYTES # BLD AUTO: 0.01 K/UL — SIGNIFICANT CHANGE UP (ref 0–0.07)
IMM GRANULOCYTES NFR BLD AUTO: 0.2 % — SIGNIFICANT CHANGE UP (ref 0–0.9)
LYMPHOCYTES # BLD AUTO: 0.65 K/UL — LOW (ref 1–3.3)
LYMPHOCYTES NFR BLD AUTO: 11.7 % — LOW (ref 13–44)
MCHC RBC-ENTMCNC: 30.7 G/DL — LOW (ref 32–36)
MCHC RBC-ENTMCNC: 30.9 PG — SIGNIFICANT CHANGE UP (ref 27–34)
MCV RBC AUTO: 100.8 FL — HIGH (ref 80–100)
MONOCYTES # BLD AUTO: 0.62 K/UL — SIGNIFICANT CHANGE UP (ref 0–0.9)
MONOCYTES NFR BLD AUTO: 11.2 % — SIGNIFICANT CHANGE UP (ref 2–14)
NEUTROPHILS # BLD AUTO: 3.73 K/UL — SIGNIFICANT CHANGE UP (ref 1.8–7.4)
NEUTROPHILS NFR BLD AUTO: 67.2 % — SIGNIFICANT CHANGE UP (ref 43–77)
NRBC # BLD AUTO: 0 K/UL — SIGNIFICANT CHANGE UP (ref 0–0)
NRBC # FLD: 0 K/UL — SIGNIFICANT CHANGE UP (ref 0–0)
NRBC BLD AUTO-RTO: 0 /100 WBCS — SIGNIFICANT CHANGE UP (ref 0–0)
PLATELET # BLD AUTO: 166 K/UL — SIGNIFICANT CHANGE UP (ref 150–400)
PMV BLD: 10.8 FL — SIGNIFICANT CHANGE UP (ref 7–13)
POTASSIUM SERPL-MCNC: 4.4 MMOL/L — SIGNIFICANT CHANGE UP (ref 3.5–5.3)
POTASSIUM SERPL-SCNC: 4.4 MMOL/L — SIGNIFICANT CHANGE UP (ref 3.5–5.3)
PROT SERPL-MCNC: 6.1 G/DL — LOW (ref 6.6–8.7)
RBC # BLD: 2.65 M/UL — LOW (ref 4.2–5.8)
RBC # FLD: 16.1 % — HIGH (ref 10.3–14.5)
SODIUM SERPL-SCNC: 140 MMOL/L — SIGNIFICANT CHANGE UP (ref 135–145)
WBC # BLD: 5.55 K/UL — SIGNIFICANT CHANGE UP (ref 3.8–10.5)
WBC # FLD AUTO: 5.55 K/UL — SIGNIFICANT CHANGE UP (ref 3.8–10.5)

## 2025-06-02 PROCEDURE — 99232 SBSQ HOSP IP/OBS MODERATE 35: CPT

## 2025-06-02 RX ORDER — OXYCODONE HYDROCHLORIDE 30 MG/1
15 TABLET ORAL EVERY 6 HOURS
Refills: 0 | Status: DISCONTINUED | OUTPATIENT
Start: 2025-06-02 | End: 2025-06-03

## 2025-06-02 RX ORDER — OXYCODONE HYDROCHLORIDE 30 MG/1
10 TABLET ORAL EVERY 6 HOURS
Refills: 0 | Status: DISCONTINUED | OUTPATIENT
Start: 2025-06-02 | End: 2025-06-03

## 2025-06-02 RX ADMIN — OXYCODONE HYDROCHLORIDE 10 MILLIGRAM(S): 30 TABLET ORAL at 03:09

## 2025-06-02 RX ADMIN — FOLIC ACID 1 MILLIGRAM(S): 1 TABLET ORAL at 11:42

## 2025-06-02 RX ADMIN — OXYCODONE HYDROCHLORIDE 15 MILLIGRAM(S): 30 TABLET ORAL at 13:50

## 2025-06-02 RX ADMIN — LEVETIRACETAM 500 MILLIGRAM(S): 10 INJECTION, SOLUTION INTRAVENOUS at 08:31

## 2025-06-02 RX ADMIN — SACUBITRIL AND VALSARTAN 1 TABLET(S): 49; 51 TABLET, FILM COATED ORAL at 17:10

## 2025-06-02 RX ADMIN — Medication 0.5 MILLIGRAM(S): at 16:37

## 2025-06-02 RX ADMIN — OXYCODONE HYDROCHLORIDE 5 MILLIGRAM(S): 30 TABLET ORAL at 11:41

## 2025-06-02 RX ADMIN — OXYCODONE HYDROCHLORIDE 10 MILLIGRAM(S): 30 TABLET ORAL at 08:31

## 2025-06-02 RX ADMIN — Medication 0.5 MILLIGRAM(S): at 05:20

## 2025-06-02 RX ADMIN — Medication 0.5 MILLIGRAM(S): at 17:32

## 2025-06-02 RX ADMIN — Medication 667 MILLIGRAM(S): at 17:11

## 2025-06-02 RX ADMIN — Medication 40 MILLIGRAM(S): at 11:41

## 2025-06-02 RX ADMIN — Medication 667 MILLIGRAM(S): at 11:42

## 2025-06-02 RX ADMIN — Medication 0.5 MILLIGRAM(S): at 00:59

## 2025-06-02 RX ADMIN — CARVEDILOL 6.25 MILLIGRAM(S): 3.12 TABLET, FILM COATED ORAL at 08:31

## 2025-06-02 RX ADMIN — OXYCODONE HYDROCHLORIDE 10 MILLIGRAM(S): 30 TABLET ORAL at 04:09

## 2025-06-02 RX ADMIN — Medication 81 MILLIGRAM(S): at 11:41

## 2025-06-02 RX ADMIN — OXYCODONE HYDROCHLORIDE 15 MILLIGRAM(S): 30 TABLET ORAL at 21:49

## 2025-06-02 RX ADMIN — OXYCODONE HYDROCHLORIDE 10 MILLIGRAM(S): 30 TABLET ORAL at 09:30

## 2025-06-02 RX ADMIN — OXYCODONE HYDROCHLORIDE 5 MILLIGRAM(S): 30 TABLET ORAL at 12:40

## 2025-06-02 RX ADMIN — SACUBITRIL AND VALSARTAN 1 TABLET(S): 49; 51 TABLET, FILM COATED ORAL at 05:20

## 2025-06-02 RX ADMIN — OXYCODONE HYDROCHLORIDE 15 MILLIGRAM(S): 30 TABLET ORAL at 12:58

## 2025-06-02 RX ADMIN — TAMSULOSIN HYDROCHLORIDE 0.4 MILLIGRAM(S): 0.4 CAPSULE ORAL at 21:50

## 2025-06-02 RX ADMIN — ISOSORBIDE MONONITRATE 30 MILLIGRAM(S): 60 TABLET, EXTENDED RELEASE ORAL at 11:42

## 2025-06-02 RX ADMIN — ATORVASTATIN CALCIUM 40 MILLIGRAM(S): 80 TABLET, FILM COATED ORAL at 21:49

## 2025-06-02 RX ADMIN — Medication 1 APPLICATION(S): at 05:20

## 2025-06-02 RX ADMIN — Medication 667 MILLIGRAM(S): at 08:31

## 2025-06-02 RX ADMIN — LEVETIRACETAM 500 MILLIGRAM(S): 10 INJECTION, SOLUTION INTRAVENOUS at 21:49

## 2025-06-02 RX ADMIN — Medication 325 MILLIGRAM(S): at 11:42

## 2025-06-02 RX ADMIN — OXYCODONE HYDROCHLORIDE 15 MILLIGRAM(S): 30 TABLET ORAL at 22:30

## 2025-06-02 RX ADMIN — Medication 0.5 MILLIGRAM(S): at 06:20

## 2025-06-02 NOTE — PROGRESS NOTE ADULT - TIME BILLING
Time spent reviewing the chart documentation, reviewing labs and imaging studies, evaluating the patient, discussing the plan of care with the consultants & medical team, and documenting.
Chart, labs, orders, vitals, and imaging reviewed and plan of care discussed with consultants and IDR team in detail. Plan of care discussed with patient at bedside.
reviewing labs, notes, orders, radiographic studies, as well as counseling and coordinating care with the relevant multidisciplinary team, including with the primary and consulting providers.

## 2025-06-02 NOTE — PROGRESS NOTE ADULT - SUBJECTIVE AND OBJECTIVE BOX
NEPHROLOGY INTERVAL HPI/OVERNIGHT EVENTS:  stable overnight  comfortable    MEDICATIONS  (STANDING):  aspirin  chewable 81 milliGRAM(s) Oral daily  atorvastatin 40 milliGRAM(s) Oral at bedtime  calcium acetate 667 milliGRAM(s) Oral three times a day with meals  carvedilol 6.25 milliGRAM(s) Oral every 12 hours  chlorhexidine 2% Cloths 1 Application(s) Topical <User Schedule>  epoetin yolanda-epbx (RETACRIT) Injectable 18156 Unit(s) IV Push <User Schedule>  ferrous    sulfate 325 milliGRAM(s) Oral daily  folic acid 1 milliGRAM(s) Oral daily  isosorbide   mononitrate ER Tablet (IMDUR) 30 milliGRAM(s) Oral daily  levETIRAcetam 500 milliGRAM(s) Oral two times a day  pantoprazole  Injectable 40 milliGRAM(s) IV Push daily  sacubitril 24 mG/valsartan 26 mG 1 Tablet(s) Oral two times a day  tamsulosin 0.4 milliGRAM(s) Oral at bedtime    MEDICATIONS  (PRN):  acetaminophen     Tablet .. 650 milliGRAM(s) Oral every 6 hours PRN Mild Pain (1 - 3)  hydrALAZINE Injectable 10 milliGRAM(s) IV Push every 3 hours PRN sbp>160  HYDROmorphone  Injectable 0.5 milliGRAM(s) IV Push every 4 hours PRN breakthrough pain  ondansetron Injectable 4 milliGRAM(s) IV Push every 8 hours PRN Nausea and/or Vomiting  oxyCODONE    IR 10 milliGRAM(s) Oral every 4 hours PRN Severe Pain (7 - 10)  oxyCODONE    IR 5 milliGRAM(s) Oral every 4 hours PRN Moderate Pain (4 - 6)  sodium chloride 0.9% lock flush 10 milliLiter(s) IV Push every 1 hour PRN Pre/post blood products, medications, blood draw, and to maintain line patency      Allergies    penicillin (Other)        Vital Signs Last 24 Hrs  T(C): 36.5 (02 Jun 2025 04:18), Max: 36.5 (01 Jun 2025 16:53)  T(F): 97.7 (02 Jun 2025 04:18), Max: 97.7 (01 Jun 2025 16:53)  HR: 59 (02 Jun 2025 04:18) (59 - 70)  BP: 110/49 (02 Jun 2025 04:18) (110/49 - 148/53)  BP(mean): --  RR: 18 (02 Jun 2025 04:18) (16 - 18)  SpO2: 98% (02 Jun 2025 04:18) (95% - 98%)    Parameters below as of 01 Jun 2025 19:41  Patient On (Oxygen Delivery Method): room air        PHYSICAL EXAM:  GENERAL: Enervated  HEENT: No periorbital edema  NECK: Supple, No JVD  NERVOUS SYSTEM:  A/O x3  CHEST: Clear, diminished BS  HEART:  RRR, no rub  ABDOMEN: Soft, NT/ND BS+  EXTREMITIES: Tr dependent edema; R leg drain; diffuse muscle atrophy    LABS:                        8.2    5.55  )-----------( 166      ( 02 Jun 2025 04:41 )             26.7     06-01    139  |  99  |  24.6[H]  ----------------------------<  89  4.1   |  28.0  |  4.66[H]    Ca    8.5      01 Jun 2025 04:36  Phos  4.4     05-31  Mg     1.9     05-31    TPro  5.9[L]  /  Alb  3.1[L]  /  TBili  1.6  /  DBili  x   /  AST  23  /  ALT  <5  /  AlkPhos  104  05-31      Urinalysis Basic - ( 01 Jun 2025 04:36 )    Color: x / Appearance: x / SG: x / pH: x  Gluc: 89 mg/dL / Ketone: x  / Bili: x / Urobili: x   Blood: x / Protein: x / Nitrite: x   Leuk Esterase: x / RBC: x / WBC x   Sq Epi: x / Non Sq Epi: x / Bacteria: x          RADIOLOGY & ADDITIONAL TESTS:  < from: CT Abdomen and Pelvis No Cont (05.29.25 @ 19:26) >    ACC: [id number] EXAM:  CT ABDOMEN AND PELVIS   ORDERED BY: ARIC STEIN     PROCEDURE DATE:  05/29/2025          INTERPRETATION:  CLINICAL INFORMATION: 70-year-old male with a hematoma   which appears to be getting larger    COMPARISON: May 23, 2025    CONTRAST/COMPLICATIONS:  IV Contrast: NONE  Oral Contrast: NONE  .    PROCEDURE:  CT of the Abdomen and Pelvis was performed.  Sagittal and coronal reformats were performed.    FINDINGS:    Lack of intravenous contrast limits evaluation of the abdominal/pelvic   viscera. Lack of oral contrast limits evaluation of the bowel.    LOWER CHEST: Coronary artery calcifications. Dense mitral valve annular   calcifications. Left lower lobe atelectasis.    LIVER: Within normal limits.  BILE DUCTS: Normal caliber.  GALLBLADDER: Cholelithiasis.  SPLEEN: Within normal limits.  PANCREAS: Within normal limits.  ADRENALS: Within normal limits.  KIDNEYS/URETERS: Within normal limits. Bilateral renal cysts.    BLADDER: Minimally distended.  REPRODUCTIVE ORGANS: Prostate within normal limits.    BOWEL: No bowel obstruction.  PERITONEUM/RETROPERITONEUM: Within normal limits.  VESSELS: There is a calcified chronic aortic dissection, calcified   atherosclerosis of the iliac arteries.  LYMPH NODES: No lymphadenopathy.  ABDOMINAL WALL: Within normal limits.  BONES: Degenerative changes.    There is asymmetric swelling of the right thigh soft tissues. A drain is   seen entering from the anterior upper thigh, residing in the medial soft   tissues of the thigh. The previously seen hematoma is much smaller. A   focal component of the hematoma anteriorly in the right thigh measures   3.9 x 3.5 cm and contains bubbles of air.    IMPRESSION:    The right thigh hematoma is much smaller on today's study than it was on   the study from March 23, 2025.    Air within the hematoma may be iatrogenic or related to   evacuation/presence of the drain.  Infection cannot be excluded,   correlate clinically.    < end of copied text >

## 2025-06-02 NOTE — PROGRESS NOTE ADULT - SUBJECTIVE AND OBJECTIVE BOX
[facility] Division of Hospital Medicine    Chief Complaint:      SUBJECTIVE / OVERNIGHT EVENTS:    Patient seen and examined at bedside, no acute events overnight, patient denies any new complaints. Discussed with vascular, patient will likely be DC'd with DEDRICK drain and continued outpatient follow up with Vascular team. Discussed with HF, patient will not get MEMS at this time as groin access may be challenging / risks likely outweigh benefits. Pain still uncontrolled, adjusted oxycodone, if no further interventions, patient largely stable for DC home when pain control achieved.     MEDICATIONS  (STANDING):  aspirin  chewable 81 milliGRAM(s) Oral daily  atorvastatin 40 milliGRAM(s) Oral at bedtime  calcium acetate 667 milliGRAM(s) Oral three times a day with meals  carvedilol 6.25 milliGRAM(s) Oral every 12 hours  chlorhexidine 2% Cloths 1 Application(s) Topical <User Schedule>  epoetin yolanda-epbx (RETACRIT) Injectable 83676 Unit(s) IV Push <User Schedule>  ferrous    sulfate 325 milliGRAM(s) Oral daily  folic acid 1 milliGRAM(s) Oral daily  isosorbide   mononitrate ER Tablet (IMDUR) 30 milliGRAM(s) Oral daily  levETIRAcetam 500 milliGRAM(s) Oral two times a day  pantoprazole  Injectable 40 milliGRAM(s) IV Push daily  sacubitril 24 mG/valsartan 26 mG 1 Tablet(s) Oral two times a day  tamsulosin 0.4 milliGRAM(s) Oral at bedtime    MEDICATIONS  (PRN):  acetaminophen     Tablet .. 650 milliGRAM(s) Oral every 6 hours PRN Mild Pain (1 - 3)  hydrALAZINE Injectable 10 milliGRAM(s) IV Push every 3 hours PRN sbp>160  HYDROmorphone  Injectable 0.5 milliGRAM(s) IV Push every 4 hours PRN breakthrough pain  ondansetron Injectable 4 milliGRAM(s) IV Push every 8 hours PRN Nausea and/or Vomiting  oxyCODONE    IR 10 milliGRAM(s) Oral every 6 hours PRN Moderate Pain (4 - 6)  oxyCODONE    IR 15 milliGRAM(s) Oral every 6 hours PRN Severe Pain (7 - 10)  sodium chloride 0.9% lock flush 10 milliLiter(s) IV Push every 1 hour PRN Pre/post blood products, medications, blood draw, and to maintain line patency        I&O's Summary    01 Jun 2025 07:01  -  02 Jun 2025 07:00  --------------------------------------------------------  IN: 286 mL / OUT: 65 mL / NET: 221 mL        PHYSICAL EXAM:  Vital Signs Last 24 Hrs  T(C): 36.7 (02 Jun 2025 11:01), Max: 36.7 (02 Jun 2025 11:01)  T(F): 98.1 (02 Jun 2025 11:01), Max: 98.1 (02 Jun 2025 11:01)  HR: 69 (02 Jun 2025 13:00) (59 - 70)  BP: 116/52 (02 Jun 2025 13:00) (110/49 - 148/53)  BP(mean): --  RR: 18 (02 Jun 2025 11:01) (18 - 18)  SpO2: 95% (02 Jun 2025 11:01) (95% - 98%)    Parameters below as of 02 Jun 2025 11:01  Patient On (Oxygen Delivery Method): room air      GENERAL: pt examined bedside, laying comfortably in bed in NAD  HEENT: NC/AT, moist oral mucosa  RESPIRATORY: CTABL   CARDIOVASCULAR: irregularly irregular, normal S1 and S2, +murmurs  ABDOMEN: soft, NT/ND, normoactive bowel sounds, no rebound/guarding  EXTREMITIES: No cynaosis, no clubbing, groin hematoma slightly enlarged, significantly tender in RLE with DEDRICK drain in place   NEUROLOGY: A+O to person, place, and time, no focal neurologic deficits appreciated   SKIN: pallor, Rt groin hematoma w/ dressing, drain    LABS:                        8.2    5.55  )-----------( 166      ( 02 Jun 2025 04:41 )             26.7     06-02    140  |  98  |  38.1[H]  ----------------------------<  93  4.4   |  27.0  |  6.52[H]    Ca    8.8      02 Jun 2025 04:41    TPro  6.1[L]  /  Alb  3.5  /  TBili  2.0  /  DBili  x   /  AST  19  /  ALT  7   /  AlkPhos  119  06-02          Urinalysis Basic - ( 02 Jun 2025 04:41 )    Color: x / Appearance: x / SG: x / pH: x  Gluc: 93 mg/dL / Ketone: x  / Bili: x / Urobili: x   Blood: x / Protein: x / Nitrite: x   Leuk Esterase: x / RBC: x / WBC x   Sq Epi: x / Non Sq Epi: x / Bacteria: x        CAPILLARY BLOOD GLUCOSE            RADIOLOGY & ADDITIONAL TESTS:  Results Reviewed:   Imaging Personally Reviewed:  Electrocardiogram Personally Reviewed:

## 2025-06-02 NOTE — PROGRESS NOTE ADULT - ASSESSMENT
ESRD:  - HD tomorrow    Anemia: + CKD + thigh hematoma  Repeat CT scan show resolving hematoma  - cont REINALDO  - further PRBCs as needed  - target Hgb > 10.0  - await surgical f/u    RO:  - low phos diet  - cont Phoslo  - trend phos

## 2025-06-02 NOTE — PROGRESS NOTE ADULT - ASSESSMENT
63 y/o M w/ PMH of ESRD s/p renal transplant on HD Tu/Th/Sat, type A aortic dissection s/p repair (2010), ischemic bowel resection, CAD s/p CABG x 2 (SVG-OM1and SVG-RPDA), bioprosthetic AVR/MVR (2020 w/ Dr. Butler), AF s/p ablation (2021) and LAAO device (8/2024), MV endocarditis (2023), seizure disorder, BPH, remote hx of substance abuse and former smoker initially presented to [facility] c/o SOB and was admitted 5/19/25 to medicine for new acute CHF exacerbation.  TTE on 5/19/25 was significant for newly reduced LV EF 35-40%.  Pt underwent RHC/LHC on 5/22/25 via RFA.  RHC showed mildly elevated filling pressures and increased CO.  During LHC was unable to pass through aortic root due to prior type A dissection repair.  Hospital course complicated by pt developing Rt groin hematoma on 05/23 noted in AM.  Cardio/cath lab called.  Initially manual pressure applied and fem stop placed but hematoma reoccurred/expanded refractory to fem stop.  Vascular was consulted and stat CTA RLE was ordered which showed a massive hematoma and pt was urgently taken to OR for exploration/ evacuation of  RLE hematoma,  Pt received 1 unit PRBC pre OR but during OR pt went into hemorrhagic shock and MTP was initiated and pt was subsequently given another 4u pRBC, 2u FFP and 1u plt in OR.  Pt s/p open cut-down and evacuation of 1L of fresh blood, femoral artery behind profunda vein was ligated and transected, 3mm arteriotomy w/ active bleeding at common femoral artery bifurcation repaired with suture, bleeding stopped and Rt groin DEDRICK drain placed.  Given pt received significant amount of blood products pre/intra-op decision was made to leave pt intubated for resp failure 2/2 vol overload from CHF but did not require pressor support.  dowmngraded from micu 5/24, vascular / IC monitoring RLE hematoma, no current intervention, adjusting pain control for R. hematoma. no further intervention per HF team / vascular team.     #Hemorrhagic shock 2/2 rt groin hematoma s/p LHC VIA RFA   - No pressors required but did undergo MTP intra-op for hemorrhagic shock  - Pt s/p total of 6/2/1 prbc/ffp/plt during hospital course    - 5/23 s/p open cut-down and evacuation of 1L of fresh blood, femoral artery behind profunda vein was ligated and transected, 3mm arteriotomy w/ active bleeding at common femoral artery bifurcation, repaired with suture and cessation of bleeding  - Pt hemodynamically stable w/ RLE warm, palpable DP/PT noted and dressing in place that has minimal amount of dry blood appreciated   - Rt groin DEDRICK drain in place w/ increased OP overnight per patient,  drained in AM   - Maintained intubated s/p OR for concern for impending resp failure 2/2 vol overload given amount of blood products transfused in short time frame in setting of rEF   - pt successfully extubated AM of 5/24 and currently satting well ra  - Continue to trend H/h keep type and screen active  - Vascular surgery consult following, no further intervention planned, patient will follow in clinic for further management of DEDRICK drain   - dc'd hep sub q  - epo during hd    #acute decompensated HFrEF likely 2/2 CAD   - Clinically euvolemic at this time and satting well ORA  - TTE 5/19/25: LVEF 35-40%, mod to severe LVH, normal RV size/function, well seated normal functioning bioprosthetic AV, well seated normal functioning bioprosthetic MV w/ trace intravalvular regurgitation, PASP 83 mmHg (RAP 15 mmHg)   - s/p RCH 5/22 showed mildly elevated filling pressures and increased CO  - s/p LHC 5/22 was attempted via RFA access but unable to pass through aortic root in view of prior type A dissection repair   - s/p CTA 05/23 revealed LM, LAD, RCA and circumflex were all reported to have diffuse significant calcified plaque noted w/ multiple areas of indeterminate stenoses.  Diagonal branches, OM, PDA, posterior branch and ramus intermedius were not well visualized.    - c/w statin   -  Imdur, Entresto, Coreg continued at this time with hold parameters   hydralazine held   - hd performed 5/31, continue HD as per nephro   no further adjustments of medications as per HF team,     #Chronic HF  - s/p watchman procedure  - Not on AC  - c/w coreg     #HTN / HLD   - Continue holding Hydralazine PO, torsemide,   - Imdur and entresto continued, monitor for hypotension     #End stage renal disease  - c/w HD as per nephro   - Nephro following     #Seizure disorder  - c/w keppra     #BPH  - c/w flomax    VTE ppx:  hep sub q (on hold)  Dispo: still acute, pending improvement in pain, expect DC home 1-2 days with home care /vascular follow up for DEDRICK drain

## 2025-06-03 VITALS
HEART RATE: 67 BPM | DIASTOLIC BLOOD PRESSURE: 52 MMHG | TEMPERATURE: 98 F | SYSTOLIC BLOOD PRESSURE: 141 MMHG | RESPIRATION RATE: 18 BRPM | OXYGEN SATURATION: 96 %

## 2025-06-03 LAB
ANION GAP SERPL CALC-SCNC: 14 MMOL/L — SIGNIFICANT CHANGE UP (ref 5–17)
BUN SERPL-MCNC: 47.5 MG/DL — HIGH (ref 8–20)
CALCIUM SERPL-MCNC: 9 MG/DL — SIGNIFICANT CHANGE UP (ref 8.4–10.5)
CHLORIDE SERPL-SCNC: 98 MMOL/L — SIGNIFICANT CHANGE UP (ref 96–108)
CO2 SERPL-SCNC: 27 MMOL/L — SIGNIFICANT CHANGE UP (ref 22–29)
CREAT SERPL-MCNC: 7.3 MG/DL — HIGH (ref 0.5–1.3)
EGFR: 8 ML/MIN/1.73M2 — LOW
EGFR: 8 ML/MIN/1.73M2 — LOW
GLUCOSE SERPL-MCNC: 90 MG/DL — SIGNIFICANT CHANGE UP (ref 70–99)
POTASSIUM SERPL-MCNC: 4.3 MMOL/L — SIGNIFICANT CHANGE UP (ref 3.5–5.3)
POTASSIUM SERPL-SCNC: 4.3 MMOL/L — SIGNIFICANT CHANGE UP (ref 3.5–5.3)
SODIUM SERPL-SCNC: 139 MMOL/L — SIGNIFICANT CHANGE UP (ref 135–145)

## 2025-06-03 PROCEDURE — 36600 WITHDRAWAL OF ARTERIAL BLOOD: CPT

## 2025-06-03 PROCEDURE — 36430 TRANSFUSION BLD/BLD COMPNT: CPT

## 2025-06-03 PROCEDURE — P9047: CPT

## 2025-06-03 PROCEDURE — 82330 ASSAY OF CALCIUM: CPT

## 2025-06-03 PROCEDURE — 83540 ASSAY OF IRON: CPT

## 2025-06-03 PROCEDURE — 82728 ASSAY OF FERRITIN: CPT

## 2025-06-03 PROCEDURE — C1889: CPT

## 2025-06-03 PROCEDURE — 85610 PROTHROMBIN TIME: CPT

## 2025-06-03 PROCEDURE — 99285 EMERGENCY DEPT VISIT HI MDM: CPT

## 2025-06-03 PROCEDURE — 87641 MR-STAPH DNA AMP PROBE: CPT

## 2025-06-03 PROCEDURE — 85014 HEMATOCRIT: CPT

## 2025-06-03 PROCEDURE — P9059: CPT

## 2025-06-03 PROCEDURE — 96375 TX/PRO/DX INJ NEW DRUG ADDON: CPT | Mod: XU

## 2025-06-03 PROCEDURE — 83550 IRON BINDING TEST: CPT

## 2025-06-03 PROCEDURE — 74176 CT ABD & PELVIS W/O CONTRAST: CPT

## 2025-06-03 PROCEDURE — 93005 ELECTROCARDIOGRAM TRACING: CPT

## 2025-06-03 PROCEDURE — 93931 UPPER EXTREMITY STUDY: CPT

## 2025-06-03 PROCEDURE — C1760: CPT

## 2025-06-03 PROCEDURE — 36415 COLL VENOUS BLD VENIPUNCTURE: CPT

## 2025-06-03 PROCEDURE — C1887: CPT

## 2025-06-03 PROCEDURE — 80053 COMPREHEN METABOLIC PANEL: CPT

## 2025-06-03 PROCEDURE — C1769: CPT

## 2025-06-03 PROCEDURE — 93567 NJX CAR CTH SPRVLV AORTGRPHY: CPT

## 2025-06-03 PROCEDURE — 96374 THER/PROPH/DIAG INJ IV PUSH: CPT

## 2025-06-03 PROCEDURE — 83880 ASSAY OF NATRIURETIC PEPTIDE: CPT

## 2025-06-03 PROCEDURE — 71250 CT THORAX DX C-: CPT

## 2025-06-03 PROCEDURE — 82803 BLOOD GASES ANY COMBINATION: CPT

## 2025-06-03 PROCEDURE — P9073: CPT

## 2025-06-03 PROCEDURE — 84295 ASSAY OF SERUM SODIUM: CPT

## 2025-06-03 PROCEDURE — 82962 GLUCOSE BLOOD TEST: CPT

## 2025-06-03 PROCEDURE — 71045 X-RAY EXAM CHEST 1 VIEW: CPT

## 2025-06-03 PROCEDURE — 86900 BLOOD TYPING SEROLOGIC ABO: CPT

## 2025-06-03 PROCEDURE — C1894: CPT

## 2025-06-03 PROCEDURE — 86850 RBC ANTIBODY SCREEN: CPT

## 2025-06-03 PROCEDURE — 80048 BASIC METABOLIC PNL TOTAL CA: CPT

## 2025-06-03 PROCEDURE — 84132 ASSAY OF SERUM POTASSIUM: CPT

## 2025-06-03 PROCEDURE — 87640 STAPH A DNA AMP PROBE: CPT

## 2025-06-03 PROCEDURE — 85025 COMPLETE CBC W/AUTO DIFF WBC: CPT

## 2025-06-03 PROCEDURE — 86922 COMPATIBILITY TEST ANTIGLOB: CPT

## 2025-06-03 PROCEDURE — 94002 VENT MGMT INPAT INIT DAY: CPT

## 2025-06-03 PROCEDURE — 85027 COMPLETE CBC AUTOMATED: CPT

## 2025-06-03 PROCEDURE — 85730 THROMBOPLASTIN TIME PARTIAL: CPT

## 2025-06-03 PROCEDURE — 36410 VNPNXR 3YR/> PHY/QHP DX/THER: CPT

## 2025-06-03 PROCEDURE — 75635 CT ANGIO ABDOMINAL ARTERIES: CPT

## 2025-06-03 PROCEDURE — 71046 X-RAY EXAM CHEST 2 VIEWS: CPT

## 2025-06-03 PROCEDURE — 84466 ASSAY OF TRANSFERRIN: CPT

## 2025-06-03 PROCEDURE — 83605 ASSAY OF LACTIC ACID: CPT

## 2025-06-03 PROCEDURE — 82435 ASSAY OF BLOOD CHLORIDE: CPT

## 2025-06-03 PROCEDURE — 93321 DOPPLER ECHO F-UP/LMTD STD: CPT

## 2025-06-03 PROCEDURE — 86901 BLOOD TYPING SEROLOGIC RH(D): CPT

## 2025-06-03 PROCEDURE — 84484 ASSAY OF TROPONIN QUANT: CPT

## 2025-06-03 PROCEDURE — 86902 BLOOD TYPE ANTIGEN DONOR EA: CPT

## 2025-06-03 PROCEDURE — 82947 ASSAY GLUCOSE BLOOD QUANT: CPT

## 2025-06-03 PROCEDURE — P9016: CPT

## 2025-06-03 PROCEDURE — 99239 HOSP IP/OBS DSCHRG MGMT >30: CPT

## 2025-06-03 PROCEDURE — 86870 RBC ANTIBODY IDENTIFICATION: CPT

## 2025-06-03 PROCEDURE — 75574 CT ANGIO HRT W/3D IMAGE: CPT

## 2025-06-03 PROCEDURE — 83735 ASSAY OF MAGNESIUM: CPT

## 2025-06-03 PROCEDURE — 93451 RIGHT HEART CATH: CPT

## 2025-06-03 PROCEDURE — 93308 TTE F-UP OR LMTD: CPT

## 2025-06-03 PROCEDURE — 84100 ASSAY OF PHOSPHORUS: CPT

## 2025-06-03 PROCEDURE — 86880 COOMBS TEST DIRECT: CPT

## 2025-06-03 PROCEDURE — 85018 HEMOGLOBIN: CPT

## 2025-06-03 PROCEDURE — 99261: CPT

## 2025-06-03 RX ORDER — FOLIC ACID 1 MG/1
1 TABLET ORAL
Qty: 30 | Refills: 0
Start: 2025-06-03 | End: 2025-07-02

## 2025-06-03 RX ORDER — ISOSORBIDE MONONITRATE 60 MG/1
1 TABLET, EXTENDED RELEASE ORAL
Qty: 30 | Refills: 0
Start: 2025-06-03 | End: 2025-07-02

## 2025-06-03 RX ORDER — CARVEDILOL 3.12 MG/1
1 TABLET, FILM COATED ORAL
Qty: 0 | Refills: 0 | DISCHARGE
Start: 2025-06-03

## 2025-06-03 RX ORDER — FERROUS SULFATE 137(45) MG
1 TABLET, EXTENDED RELEASE ORAL
Qty: 30 | Refills: 0
Start: 2025-06-03 | End: 2025-07-02

## 2025-06-03 RX ORDER — SACUBITRIL AND VALSARTAN 49; 51 MG/1; MG/1
1 TABLET, FILM COATED ORAL
Qty: 60 | Refills: 0
Start: 2025-06-03 | End: 2025-07-02

## 2025-06-03 RX ORDER — LEVETIRACETAM 10 MG/ML
1 INJECTION, SOLUTION INTRAVENOUS
Qty: 0 | Refills: 0 | DISCHARGE
Start: 2025-06-03

## 2025-06-03 RX ORDER — LEVETIRACETAM 10 MG/ML
1 INJECTION, SOLUTION INTRAVENOUS
Refills: 0 | DISCHARGE

## 2025-06-03 RX ORDER — OXYCODONE HYDROCHLORIDE 30 MG/1
1 TABLET ORAL
Qty: 20 | Refills: 0
Start: 2025-06-03 | End: 2025-06-07

## 2025-06-03 RX ADMIN — OXYCODONE HYDROCHLORIDE 10 MILLIGRAM(S): 30 TABLET ORAL at 12:26

## 2025-06-03 RX ADMIN — Medication 1 APPLICATION(S): at 05:07

## 2025-06-03 RX ADMIN — Medication 667 MILLIGRAM(S): at 08:17

## 2025-06-03 RX ADMIN — Medication 0.5 MILLIGRAM(S): at 08:17

## 2025-06-03 RX ADMIN — OXYCODONE HYDROCHLORIDE 10 MILLIGRAM(S): 30 TABLET ORAL at 13:30

## 2025-06-03 RX ADMIN — LEVETIRACETAM 500 MILLIGRAM(S): 10 INJECTION, SOLUTION INTRAVENOUS at 08:17

## 2025-06-03 RX ADMIN — Medication 0.5 MILLIGRAM(S): at 02:29

## 2025-06-03 RX ADMIN — SACUBITRIL AND VALSARTAN 1 TABLET(S): 49; 51 TABLET, FILM COATED ORAL at 05:06

## 2025-06-03 RX ADMIN — EPOETIN ALFA 10000 UNIT(S): 10000 SOLUTION INTRAVENOUS; SUBCUTANEOUS at 12:40

## 2025-06-03 RX ADMIN — OXYCODONE HYDROCHLORIDE 15 MILLIGRAM(S): 30 TABLET ORAL at 05:06

## 2025-06-03 RX ADMIN — Medication 0.5 MILLIGRAM(S): at 03:00

## 2025-06-03 RX ADMIN — OXYCODONE HYDROCHLORIDE 15 MILLIGRAM(S): 30 TABLET ORAL at 05:30

## 2025-06-03 RX ADMIN — CARVEDILOL 6.25 MILLIGRAM(S): 3.12 TABLET, FILM COATED ORAL at 08:17

## 2025-06-03 NOTE — CHART NOTE - NSCHARTNOTEFT_GEN_A_CORE
Source: Patient and chart review    Current Diet:   DASH/TLC:   Sodium & Cholesterol Restricted  1500mL Fluid Restriction ([id number]) (05-24-25) [Active]    Patient visited, reports fair PO intake/appetite. Pt reports not feeling comfortable with institutional food and endorses appetite would improve once he is back home. Denies any nausea, vomiting, diarrhea or constipation at this time.     PO intake:  < 50% [ ]   50-75%  [ ]   %  [ ]  other :    Source for PO intake [ ] Patient [ ] family [ ] chart [ ] staff [ ] other    Enteral /Parenteral Nutrition:     Current Weight:     % Weight Change     Pertinent Medications: MEDICATIONS  (STANDING):  aspirin  chewable 81 milliGRAM(s) Oral daily  atorvastatin 40 milliGRAM(s) Oral at bedtime  calcium acetate 667 milliGRAM(s) Oral three times a day with meals  carvedilol 6.25 milliGRAM(s) Oral every 12 hours  chlorhexidine 2% Cloths 1 Application(s) Topical <User Schedule>  epoetin yolanda-epbx (RETACRIT) Injectable 40200 Unit(s) IV Push <User Schedule>  ferrous    sulfate 325 milliGRAM(s) Oral daily  folic acid 1 milliGRAM(s) Oral daily  isosorbide   mononitrate ER Tablet (IMDUR) 30 milliGRAM(s) Oral daily  levETIRAcetam 500 milliGRAM(s) Oral two times a day  pantoprazole  Injectable 40 milliGRAM(s) IV Push daily  sacubitril 24 mG/valsartan 26 mG 1 Tablet(s) Oral two times a day  tamsulosin 0.4 milliGRAM(s) Oral at bedtime    MEDICATIONS  (PRN):  acetaminophen     Tablet .. 650 milliGRAM(s) Oral every 6 hours PRN Mild Pain (1 - 3)  hydrALAZINE Injectable 10 milliGRAM(s) IV Push every 3 hours PRN sbp>160  HYDROmorphone  Injectable 0.5 milliGRAM(s) IV Push every 4 hours PRN breakthrough pain  ondansetron Injectable 4 milliGRAM(s) IV Push every 8 hours PRN Nausea and/or Vomiting  oxyCODONE    IR 10 milliGRAM(s) Oral every 6 hours PRN Moderate Pain (4 - 6)  oxyCODONE    IR 15 milliGRAM(s) Oral every 6 hours PRN Severe Pain (7 - 10)  sodium chloride 0.9% lock flush 10 milliLiter(s) IV Push every 1 hour PRN Pre/post blood products, medications, blood draw, and to maintain line patency    Pertinent Labs: CBC Full  -  ( 02 Jun 2025 04:41 )  WBC Count : 5.55 K/uL  RBC Count : 2.65 M/uL  Hemoglobin : 8.2 g/dL  Hematocrit : 26.7 %  Platelet Count - Automated : 166 K/uL  Mean Cell Volume : 100.8 fl  Mean Cell Hemoglobin : 30.9 pg  Mean Cell Hemoglobin Concentration : 30.7 g/dL  Auto Neutrophil # : 3.73 K/uL  Auto Lymphocyte # : 0.65 K/uL  Auto Monocyte # : 0.62 K/uL  Auto Eosinophil # : 0.50 K/uL  Auto Basophil # : 0.04 K/uL  Auto Neutrophil % : 67.2 %  Auto Lymphocyte % : 11.7 %  Auto Monocyte % : 11.2 %  Auto Eosinophil % : 9.0 %  Auto Basophil % : 0.7 %          Skin:     Nutrition focused physical exam conducted - found signs of malnutrition [ ]absent [ ]present    Subcutaneous fat loss: [ ] Orbital fat pads region, [ ]Buccal fat region, [ ]Triceps region,  [ ]Ribs region    Muscle wasting: [ ]Temples region, [ ]Clavicle region, [ ]Shoulder region, [ ]Scapula region, [ ]Interosseous region,  [ ]thigh region, [ ]Calf region    Estimated Needs:   [ ] no change since previous assessment  [ ] recalculated:     Current Nutrition Diagnosis:    Recommendations:     Monitoring and Evaluation:   [ ] PO intake [ ] Tolerance to diet prescription [X] Weights  [X] Follow up per protocol [X] Labs: Source: Patient and chart review    Current Diet:   DASH/TLC:   Sodium & Cholesterol Restricted  1500mL Fluid Restriction ([id number]) (05-24-25) [Active]    Patient visited, reports fair PO intake/appetite. Pt reports not feeling comfortable with institutional food and endorses appetite would improve once he is back home. Denies any nausea, vomiting, diarrhea or constipation at this time.     PO intake:  %  [X]     Source for PO intake: patient and flowsheet records     Current Weight:   (6/3) 126.3 lbs  (5/31) 138.8 lbs  (5/25) 138.8 lbs  (5/19) 139.9 lbs    Fluid accumulation:  1+ edema to scrotum; right thigh    Pertinent Medications:   MEDICATIONS  (STANDING):  atorvastatin 40 milliGRAM(s) Oral at bedtime  ferrous    sulfate 325 milliGRAM(s) Oral daily  folic acid 1 milliGRAM(s) Oral daily  isosorbide   mononitrate ER Tablet (IMDUR) 30 milliGRAM(s) Oral daily  pantoprazole  Injectable 40 milliGRAM(s) IV Push daily  sacubitril 24 mG/valsartan 26 mG 1 Tablet(s) Oral two times a day    MEDICATIONS  (PRN):  ondansetron Injectable 4 milliGRAM(s) IV Push every 8 hours PRN Nausea and/or Vomiting    Pertinent Labs:  WBC Count : 5.55 K/uL  RBC Count : 2.65 M/uL  Hemoglobin : 8.2 g/dL  Hematocrit : 26.7 %    Skin: no pressure injuries noted     Estimated Needs:   ~6276-6267 calories/day (based on 30-35 kcals/kg) using BW  ~ grams of protein/day (based on 1.4-1.6 grams/kg)      65 yo Male admitted for hemorrhagic shock secondary to right groin hematoma s/p LHC VIA RFA, acute decompensated HFrEF likely secondary to CAD.    Hospital course:  Per chart; "Hospital course complicated by pt developing Rt groin hematoma on 05/23 noted in AM.  Cardio/cath lab called.  Initially manual pressure applied and fem stop placed but hematoma reoccurred/expanded refractory to fem stop.  Vascular was consulted and stat CTA RLE was ordered which showed a massive hematoma and pt was urgently taken to OR for exploration/ evacuation of  RLE hematoma,  Pt received 1 unit PRBC pre OR but during OR pt went into hemorrhagic shock and MTP was initiated and pt was subsequently given another 4u pRBC, 2u FFP and 1u plt in OR.  Pt s/p open cut-down and evacuation of 1L of fresh blood, femoral artery behind profunda vein was ligated and transected, 3mm arteriotomy w/ active bleeding at common femoral artery bifurcation repaired with suture, bleeding stopped and Rt groin DEDRICK drain placed.  Given pt received significant amount of blood products pre/intra-op decision was made to leave pt intubated for resp failure 2/2 vol overload from CHF but did not require pressor support.  downgraded from MICU 5/24, vascular / IC monitoring RLE hematoma, no current intervention, adjusting pain control for R. hematoma. no further intervention per HF team / vascular team.     Current Nutrition Diagnosis: Increased Nutrient Needs (Protein-energy) related to increased physiologic demand for nutrient as evidenced by ESRD on HD, acute on chronic resp failure secondary to CHF    Recommendations:   1.) Recommend Renal diet, as tolerated.  2.) Recommend adding Nephro-Russel daily.  3.) Monitor diet tolerance & PO intake, weight/hydration status, nutrition-related labs, and skin.     Monitoring and Evaluation:   [X] PO intake  [X] Weights  [X] Follow up per protocol [X] Labs: renal labs. Source: Patient and chart review    Current Diet:   DASH/TLC:   Sodium & Cholesterol Restricted  1500mL Fluid Restriction ([id number]) (05-24-25) [Active]    Patient visited, reports fair PO intake/appetite. Pt reports not feeling comfortable with institutional food and endorses appetite would improve once he is back home. Denies any nausea, vomiting, diarrhea or constipation at this time.     PO intake:  %  [X]     Source for PO intake: patient and flowsheet records     Current Weight:   (6/3) 126.3 lbs  (5/31) 138.8 lbs  (5/25) 138.8 lbs  (5/19) 139.9 lbs    Fluid accumulation:  1+ edema to scrotum; right thigh    Pertinent Medications:   MEDICATIONS  (STANDING):  atorvastatin 40 milliGRAM(s) Oral at bedtime  ferrous    sulfate 325 milliGRAM(s) Oral daily  folic acid 1 milliGRAM(s) Oral daily  isosorbide   mononitrate ER Tablet (IMDUR) 30 milliGRAM(s) Oral daily  pantoprazole  Injectable 40 milliGRAM(s) IV Push daily  sacubitril 24 mG/valsartan 26 mG 1 Tablet(s) Oral two times a day    MEDICATIONS  (PRN):  ondansetron Injectable 4 milliGRAM(s) IV Push every 8 hours PRN Nausea and/or Vomiting    Pertinent Labs:  WBC Count : 5.55 K/uL  RBC Count : 2.65 M/uL  Hemoglobin : 8.2 g/dL  Hematocrit : 26.7 %    Skin: no pressure injuries noted     Estimated Needs:   ~7794-1596 calories/day (based on 30-35 kcals/kg) using BW  ~ grams of protein/day (based on 1.4-1.6 grams/kg)      65 yo Male admitted for hemorrhagic shock secondary to right groin hematoma s/p LHC VIA RFA, acute decompensated HFrEF likely secondary to CAD.    Hospital course:  Per chart; "Hospital course complicated by pt developing Rt groin hematoma on 05/23 noted in AM.  Cardio/cath lab called.  Initially manual pressure applied and fem stop placed but hematoma reoccurred/expanded refractory to fem stop.  Vascular was consulted and stat CTA RLE was ordered which showed a massive hematoma and pt was urgently taken to OR for exploration/ evacuation of  RLE hematoma,  Pt received 1 unit PRBC pre OR but during OR pt went into hemorrhagic shock and MTP was initiated and pt was subsequently given another 4u pRBC, 2u FFP and 1u plt in OR.  Pt s/p open cut-down and evacuation of 1L of fresh blood, femoral artery behind profunda vein was ligated and transected, 3mm arteriotomy w/ active bleeding at common femoral artery bifurcation repaired with suture, bleeding stopped and Rt groin DEDRICK drain placed.  Given pt received significant amount of blood products pre/intra-op decision was made to leave pt intubated for resp failure 2/2 vol overload from CHF but did not require pressor support.  downgraded from MICU 5/24, vascular / IC monitoring RLE hematoma, no current intervention, adjusting pain control for R. hematoma. no further intervention per HF team / vascular team.     Current Nutrition Diagnosis: Increased Nutrient Needs (Protein-energy) related to increased physiologic demand for nutrient as evidenced by ESRD on HD, acute on chronic resp failure secondary to CHF    Recommendations:   1.) Recommend Renal diet, as tolerated.   - Continue with fluid restricction per MD discretion.   2.) Recommend adding Nephro-Russel daily.  3.) Monitor diet tolerance & PO intake, weight/hydration status, nutrition-related labs, and skin.  Monitoring and Evaluation:   [X] PO intake  [X] Weights  [X] Follow up per protocol [X] Labs: renal labs.

## 2025-06-03 NOTE — CHART NOTE - NSCHARTNOTESELECT_GEN_ALL_CORE
Event Note
Event Note
CVC Removal/Event Note
Event Note
Nutrition Services
Nutrition Services
POC/Event Note
Transfer Note
Vascular/Event Note
post R and LHC/Event Note

## 2025-06-03 NOTE — PROGRESS NOTE ADULT - PROVIDER SPECIALTY LIST ADULT
Hospitalist
Internal Medicine
Internal Medicine
Intervent Cardiology
Intervent Cardiology
MICU
Nephrology
Vascular Surgery
Vascular Surgery
Cardiology
Cardiology
Heart Failure
Hospitalist
Internal Medicine
Intervent Cardiology
Nephrology
Vascular Surgery
Cardiology
Cardiology
Hospitalist
Hospitalist
Internal Medicine
Internal Medicine
Intervent Cardiology
Nephrology
Vascular Surgery
Internal Medicine
Nephrology
Cardiology
Hospitalist
Cardiology
Heart Failure
Heart Failure
Cardiology

## 2025-06-03 NOTE — PROGRESS NOTE ADULT - ASSESSMENT
ESRD:  - HD today    Anemia: + CKD + thigh hematoma--> clinically improved  - cont REINALDO  - further PRBCs if needed  - target Hgb > 10.0    RO:  - low phos diet  - cont Phoslo  - trend phos

## 2025-06-03 NOTE — PROGRESS NOTE ADULT - REASON FOR ADMISSION
Acute hypoxic respiratory failure

## 2025-06-03 NOTE — PROGRESS NOTE ADULT - SUBJECTIVE AND OBJECTIVE BOX
NEPHROLOGY INTERVAL HPI/OVERNIGHT EVENTS:  resting this AM  no overnight issues  comfortable    MEDICATIONS  (STANDING):  aspirin  chewable 81 milliGRAM(s) Oral daily  atorvastatin 40 milliGRAM(s) Oral at bedtime  calcium acetate 667 milliGRAM(s) Oral three times a day with meals  carvedilol 6.25 milliGRAM(s) Oral every 12 hours  chlorhexidine 2% Cloths 1 Application(s) Topical <User Schedule>  epoetin yolanda-epbx (RETACRIT) Injectable 12726 Unit(s) IV Push <User Schedule>  ferrous    sulfate 325 milliGRAM(s) Oral daily  folic acid 1 milliGRAM(s) Oral daily  isosorbide   mononitrate ER Tablet (IMDUR) 30 milliGRAM(s) Oral daily  levETIRAcetam 500 milliGRAM(s) Oral two times a day  pantoprazole  Injectable 40 milliGRAM(s) IV Push daily  sacubitril 24 mG/valsartan 26 mG 1 Tablet(s) Oral two times a day  tamsulosin 0.4 milliGRAM(s) Oral at bedtime    MEDICATIONS  (PRN):  acetaminophen     Tablet .. 650 milliGRAM(s) Oral every 6 hours PRN Mild Pain (1 - 3)  hydrALAZINE Injectable 10 milliGRAM(s) IV Push every 3 hours PRN sbp>160  HYDROmorphone  Injectable 0.5 milliGRAM(s) IV Push every 4 hours PRN breakthrough pain  ondansetron Injectable 4 milliGRAM(s) IV Push every 8 hours PRN Nausea and/or Vomiting  oxyCODONE    IR 10 milliGRAM(s) Oral every 6 hours PRN Moderate Pain (4 - 6)  oxyCODONE    IR 15 milliGRAM(s) Oral every 6 hours PRN Severe Pain (7 - 10)  sodium chloride 0.9% lock flush 10 milliLiter(s) IV Push every 1 hour PRN Pre/post blood products, medications, blood draw, and to maintain line patency      Allergies    penicillin (Other)        Vital Signs Last 24 Hrs  T(C): 36.5 (03 Jun 2025 04:35), Max: 36.7 (02 Jun 2025 11:01)  T(F): 97.7 (03 Jun 2025 04:35), Max: 98.1 (02 Jun 2025 11:01)  HR: 64 (03 Jun 2025 04:35) (58 - 69)  BP: 127/56 (03 Jun 2025 04:35) (102/46 - 127/56)  BP(mean): --  RR: 18 (03 Jun 2025 04:35) (18 - 18)  SpO2: 98% (03 Jun 2025 04:35) (95% - 98%)    Parameters below as of 03 Jun 2025 04:35  Patient On (Oxygen Delivery Method): room air        PHYSICAL EXAM:  GENERAL: Torpid  HEENT: No periorbital edema  NECK: Supple, No JVD  NERVOUS SYSTEM:  A/O x3  CHEST: Clear, diminished BS  HEART:  RRR, no rub  ABDOMEN: Soft, NT/ND BS+  EXTREMITIES: Tr dependent edema; R leg drain; diffuse muscle atrophy    LABS:                        8.2    5.55  )-----------( 166      ( 02 Jun 2025 04:41 )             26.7     06-03    139  |  98  |  47.5[H]  ----------------------------<  90  4.3   |  27.0  |  7.30[H]    Ca    9.0      03 Jun 2025 04:58    TPro  6.1[L]  /  Alb  3.5  /  TBili  2.0  /  DBili  x   /  AST  19  /  ALT  7   /  AlkPhos  119  06-02      Urinalysis Basic - ( 03 Jun 2025 04:58 )    Color: x / Appearance: x / SG: x / pH: x  Gluc: 90 mg/dL / Ketone: x  / Bili: x / Urobili: x   Blood: x / Protein: x / Nitrite: x   Leuk Esterase: x / RBC: x / WBC x   Sq Epi: x / Non Sq Epi: x / Bacteria: x          RADIOLOGY & ADDITIONAL TESTS:

## 2025-06-11 ENCOUNTER — APPOINTMENT (OUTPATIENT)
Dept: VASCULAR SURGERY | Facility: CLINIC | Age: 65
End: 2025-06-11

## 2025-06-20 ENCOUNTER — APPOINTMENT (OUTPATIENT)
Dept: VASCULAR SURGERY | Facility: CLINIC | Age: 65
End: 2025-06-20

## 2025-06-20 ENCOUNTER — APPOINTMENT (OUTPATIENT)
Dept: HEART FAILURE | Facility: CLINIC | Age: 65
End: 2025-06-20
Payer: MEDICARE

## 2025-06-20 ENCOUNTER — APPOINTMENT (OUTPATIENT)
Dept: VASCULAR SURGERY | Facility: CLINIC | Age: 65
End: 2025-06-20
Payer: MEDICARE

## 2025-06-20 VITALS
SYSTOLIC BLOOD PRESSURE: 190 MMHG | HEIGHT: 68 IN | DIASTOLIC BLOOD PRESSURE: 78 MMHG | WEIGHT: 132 LBS | BODY MASS INDEX: 20 KG/M2 | HEART RATE: 71 BPM | OXYGEN SATURATION: 99 %

## 2025-06-20 VITALS
BODY MASS INDEX: 20.31 KG/M2 | HEART RATE: 69 BPM | RESPIRATION RATE: 14 BRPM | DIASTOLIC BLOOD PRESSURE: 74 MMHG | HEIGHT: 68 IN | OXYGEN SATURATION: 95 % | WEIGHT: 134 LBS | SYSTOLIC BLOOD PRESSURE: 144 MMHG | TEMPERATURE: 98.5 F

## 2025-06-20 PROCEDURE — 99215 OFFICE O/P EST HI 40 MIN: CPT

## 2025-06-20 PROCEDURE — 93990 DOPPLER FLOW TESTING: CPT

## 2025-06-20 PROCEDURE — 99213 OFFICE O/P EST LOW 20 MIN: CPT

## 2025-06-24 ENCOUNTER — APPOINTMENT (OUTPATIENT)
Dept: CARDIOLOGY | Facility: CLINIC | Age: 65
End: 2025-06-24
Payer: MEDICARE

## 2025-06-24 VITALS
DIASTOLIC BLOOD PRESSURE: 68 MMHG | SYSTOLIC BLOOD PRESSURE: 166 MMHG | BODY MASS INDEX: 20.31 KG/M2 | HEIGHT: 68 IN | OXYGEN SATURATION: 99 % | HEART RATE: 62 BPM | WEIGHT: 134 LBS

## 2025-06-24 PROBLEM — N18.6 ESRD (END STAGE RENAL DISEASE) ON DIALYSIS: Status: ACTIVE | Noted: 2020-12-21

## 2025-06-24 PROCEDURE — 99214 OFFICE O/P EST MOD 30 MIN: CPT

## 2025-06-24 RX ORDER — FERROUS SULFATE TAB 325 MG (65 MG ELEMENTAL FE) 325 (65 FE) MG
325 (65 FE) TAB ORAL
Refills: 0 | Status: ACTIVE | COMMUNITY
Start: 2025-06-20

## 2025-06-24 RX ORDER — FOLIC ACID 1 MG/1
1 TABLET ORAL
Qty: 90 | Refills: 3 | Status: ACTIVE | COMMUNITY
Start: 2025-06-20

## 2025-06-24 RX ORDER — SEVELAMER CARBONATE 800 MG/1
800 TABLET, FILM COATED ORAL
Refills: 0 | Status: ACTIVE | COMMUNITY
Start: 2025-06-20

## 2025-06-24 RX ORDER — SACUBITRIL AND VALSARTAN 24; 26 MG/1; MG/1
24-26 TABLET, FILM COATED ORAL TWICE DAILY
Qty: 180 | Refills: 1 | Status: ACTIVE | COMMUNITY
Start: 2025-06-24

## 2025-06-26 ENCOUNTER — INPATIENT (INPATIENT)
Facility: HOSPITAL | Age: 65
LOS: 1 days | Discharge: HOME | DRG: 391 | End: 2025-06-28
Attending: HOSPITALIST | Admitting: STUDENT IN AN ORGANIZED HEALTH CARE EDUCATION/TRAINING PROGRAM
Payer: COMMERCIAL

## 2025-06-26 VITALS
RESPIRATION RATE: 20 BRPM | HEART RATE: 61 BPM | WEIGHT: 138.01 LBS | SYSTOLIC BLOOD PRESSURE: 157 MMHG | HEIGHT: 68 IN | DIASTOLIC BLOOD PRESSURE: 80 MMHG | OXYGEN SATURATION: 96 % | TEMPERATURE: 98 F

## 2025-06-26 DIAGNOSIS — I77.0 ARTERIOVENOUS FISTULA, ACQUIRED: Chronic | ICD-10-CM

## 2025-06-26 DIAGNOSIS — Z94.0 KIDNEY TRANSPLANT STATUS: Chronic | ICD-10-CM

## 2025-06-26 DIAGNOSIS — Z95.1 PRESENCE OF AORTOCORONARY BYPASS GRAFT: Chronic | ICD-10-CM

## 2025-06-26 DIAGNOSIS — Z95.2 PRESENCE OF PROSTHETIC HEART VALVE: Chronic | ICD-10-CM

## 2025-06-26 DIAGNOSIS — Z95.818 PRESENCE OF OTHER CARDIAC IMPLANTS AND GRAFTS: Chronic | ICD-10-CM

## 2025-06-26 DIAGNOSIS — Z90.49 ACQUIRED ABSENCE OF OTHER SPECIFIED PARTS OF DIGESTIVE TRACT: Chronic | ICD-10-CM

## 2025-06-26 DIAGNOSIS — Z86.79 PERSONAL HISTORY OF OTHER DISEASES OF THE CIRCULATORY SYSTEM: Chronic | ICD-10-CM

## 2025-06-26 LAB
ALBUMIN SERPL ELPH-MCNC: 3.7 G/DL — SIGNIFICANT CHANGE UP (ref 3.3–5.2)
ALP SERPL-CCNC: 123 U/L — HIGH (ref 40–120)
ALT FLD-CCNC: 14 U/L — SIGNIFICANT CHANGE UP
ANION GAP SERPL CALC-SCNC: 13 MMOL/L — SIGNIFICANT CHANGE UP (ref 5–17)
AST SERPL-CCNC: 11 U/L — SIGNIFICANT CHANGE UP
BASOPHILS # BLD AUTO: 0.02 K/UL — SIGNIFICANT CHANGE UP (ref 0–0.2)
BASOPHILS NFR BLD AUTO: 0.3 % — SIGNIFICANT CHANGE UP (ref 0–2)
BILIRUB SERPL-MCNC: 0.8 MG/DL — SIGNIFICANT CHANGE UP (ref 0.4–2)
BUN SERPL-MCNC: 76.1 MG/DL — HIGH (ref 8–20)
CALCIUM SERPL-MCNC: 9 MG/DL — SIGNIFICANT CHANGE UP (ref 8.4–10.5)
CHLORIDE SERPL-SCNC: 101 MMOL/L — SIGNIFICANT CHANGE UP (ref 96–108)
CO2 SERPL-SCNC: 23 MMOL/L — SIGNIFICANT CHANGE UP (ref 22–29)
CREAT SERPL-MCNC: 6.17 MG/DL — HIGH (ref 0.5–1.3)
D DIMER BLD IA.RAPID-MCNC: 1163 NG/ML DDU — HIGH
EGFR: 9 ML/MIN/1.73M2 — LOW
EGFR: 9 ML/MIN/1.73M2 — LOW
EOSINOPHIL # BLD AUTO: 0.16 K/UL — SIGNIFICANT CHANGE UP (ref 0–0.5)
EOSINOPHIL NFR BLD AUTO: 2.1 % — SIGNIFICANT CHANGE UP (ref 0–6)
GLUCOSE SERPL-MCNC: 97 MG/DL — SIGNIFICANT CHANGE UP (ref 70–99)
HCT VFR BLD CALC: 29.6 % — LOW (ref 39–50)
HGB BLD-MCNC: 9.2 G/DL — LOW (ref 13–17)
IMM GRANULOCYTES # BLD AUTO: 0.02 K/UL — SIGNIFICANT CHANGE UP (ref 0–0.07)
IMM GRANULOCYTES NFR BLD AUTO: 0.3 % — SIGNIFICANT CHANGE UP (ref 0–0.9)
LIDOCAIN IGE QN: 46 U/L — SIGNIFICANT CHANGE UP (ref 22–51)
LYMPHOCYTES # BLD AUTO: 0.73 K/UL — LOW (ref 1–3.3)
LYMPHOCYTES NFR BLD AUTO: 9.6 % — LOW (ref 13–44)
MAGNESIUM SERPL-MCNC: 1.6 MG/DL — SIGNIFICANT CHANGE UP (ref 1.6–2.6)
MCHC RBC-ENTMCNC: 30.7 PG — SIGNIFICANT CHANGE UP (ref 27–34)
MCHC RBC-ENTMCNC: 31.1 G/DL — LOW (ref 32–36)
MCV RBC AUTO: 98.7 FL — SIGNIFICANT CHANGE UP (ref 80–100)
MONOCYTES # BLD AUTO: 0.39 K/UL — SIGNIFICANT CHANGE UP (ref 0–0.9)
MONOCYTES NFR BLD AUTO: 5.1 % — SIGNIFICANT CHANGE UP (ref 2–14)
NEUTROPHILS # BLD AUTO: 6.28 K/UL — SIGNIFICANT CHANGE UP (ref 1.8–7.4)
NEUTROPHILS NFR BLD AUTO: 82.6 % — HIGH (ref 43–77)
NRBC # BLD AUTO: 0 K/UL — SIGNIFICANT CHANGE UP (ref 0–0)
NRBC # FLD: 0 K/UL — SIGNIFICANT CHANGE UP (ref 0–0)
NRBC BLD AUTO-RTO: 0 /100 WBCS — SIGNIFICANT CHANGE UP (ref 0–0)
NT-PROBNP SERPL-SCNC: HIGH PG/ML (ref 0–300)
PLATELET # BLD AUTO: 156 K/UL — SIGNIFICANT CHANGE UP (ref 150–400)
PMV BLD: 10.7 FL — SIGNIFICANT CHANGE UP (ref 7–13)
POTASSIUM SERPL-MCNC: 5.9 MMOL/L — HIGH (ref 3.5–5.3)
POTASSIUM SERPL-SCNC: 5.9 MMOL/L — HIGH (ref 3.5–5.3)
PROT SERPL-MCNC: 7.1 G/DL — SIGNIFICANT CHANGE UP (ref 6.6–8.7)
RBC # BLD: 3 M/UL — LOW (ref 4.2–5.8)
RBC # FLD: 15.7 % — HIGH (ref 10.3–14.5)
SODIUM SERPL-SCNC: 137 MMOL/L — SIGNIFICANT CHANGE UP (ref 135–145)
TROPONIN T, HIGH SENSITIVITY RESULT: 86 NG/L — HIGH (ref 0–51)
WBC # BLD: 7.6 K/UL — SIGNIFICANT CHANGE UP (ref 3.8–10.5)
WBC # FLD AUTO: 7.6 K/UL — SIGNIFICANT CHANGE UP (ref 3.8–10.5)

## 2025-06-26 PROCEDURE — 71045 X-RAY EXAM CHEST 1 VIEW: CPT | Mod: 26

## 2025-06-26 PROCEDURE — 74178 CT ABD&PLV WO CNTR FLWD CNTR: CPT | Mod: 26

## 2025-06-26 PROCEDURE — 76705 ECHO EXAM OF ABDOMEN: CPT | Mod: 26

## 2025-06-26 PROCEDURE — 99285 EMERGENCY DEPT VISIT HI MDM: CPT

## 2025-06-26 PROCEDURE — 71275 CT ANGIOGRAPHY CHEST: CPT | Mod: 26

## 2025-06-26 RX ORDER — DEXTROSE 50 % IN WATER 50 %
25 SYRINGE (ML) INTRAVENOUS ONCE
Refills: 0 | Status: DISCONTINUED | OUTPATIENT
Start: 2025-06-26 | End: 2025-06-28

## 2025-06-26 RX ORDER — CALCIUM GLUCONATE 20 MG/ML
2 INJECTION, SOLUTION INTRAVENOUS ONCE
Refills: 0 | Status: COMPLETED | OUTPATIENT
Start: 2025-06-26 | End: 2025-06-26

## 2025-06-26 RX ORDER — SODIUM CHLORIDE 9 G/1000ML
1000 INJECTION, SOLUTION INTRAVENOUS
Refills: 0 | Status: DISCONTINUED | OUTPATIENT
Start: 2025-06-26 | End: 2025-06-28

## 2025-06-26 RX ORDER — SODIUM ZIRCONIUM CYCLOSILICATE 5 G/5G
10 POWDER, FOR SUSPENSION ORAL ONCE
Refills: 0 | Status: COMPLETED | OUTPATIENT
Start: 2025-06-26 | End: 2025-06-26

## 2025-06-26 RX ORDER — DEXTROSE 50 % IN WATER 50 %
12.5 SYRINGE (ML) INTRAVENOUS ONCE
Refills: 0 | Status: DISCONTINUED | OUTPATIENT
Start: 2025-06-26 | End: 2025-06-28

## 2025-06-26 RX ORDER — DEXTROSE 50 % IN WATER 50 %
50 SYRINGE (ML) INTRAVENOUS ONCE
Refills: 0 | Status: COMPLETED | OUTPATIENT
Start: 2025-06-26 | End: 2025-06-26

## 2025-06-26 RX ADMIN — Medication 2 MILLIGRAM(S): at 14:37

## 2025-06-26 RX ADMIN — Medication 5 UNIT(S): at 23:19

## 2025-06-26 RX ADMIN — Medication 2 MILLIGRAM(S): at 19:09

## 2025-06-26 RX ADMIN — SODIUM ZIRCONIUM CYCLOSILICATE 10 GRAM(S): 5 POWDER, FOR SUSPENSION ORAL at 22:06

## 2025-06-26 RX ADMIN — CALCIUM GLUCONATE 200 GRAM(S): 20 INJECTION, SOLUTION INTRAVENOUS at 23:30

## 2025-06-26 RX ADMIN — Medication 2 MILLIGRAM(S): at 20:59

## 2025-06-26 RX ADMIN — Medication 50 MILLILITER(S): at 23:20

## 2025-06-27 ENCOUNTER — TRANSCRIPTION ENCOUNTER (OUTPATIENT)
Age: 65
End: 2025-06-27

## 2025-06-27 DIAGNOSIS — R07.9 CHEST PAIN, UNSPECIFIED: ICD-10-CM

## 2025-06-27 LAB
ALBUMIN SERPL ELPH-MCNC: 3.4 G/DL — SIGNIFICANT CHANGE UP (ref 3.3–5.2)
ALBUMIN SERPL ELPH-MCNC: 3.5 G/DL — SIGNIFICANT CHANGE UP (ref 3.3–5.2)
ALP SERPL-CCNC: 104 U/L — SIGNIFICANT CHANGE UP (ref 40–120)
ALP SERPL-CCNC: 107 U/L — SIGNIFICANT CHANGE UP (ref 40–120)
ALT FLD-CCNC: 11 U/L — SIGNIFICANT CHANGE UP
ALT FLD-CCNC: 11 U/L — SIGNIFICANT CHANGE UP
ANION GAP SERPL CALC-SCNC: 14 MMOL/L — SIGNIFICANT CHANGE UP (ref 5–17)
ANION GAP SERPL CALC-SCNC: 19 MMOL/L — HIGH (ref 5–17)
AST SERPL-CCNC: 10 U/L — SIGNIFICANT CHANGE UP
AST SERPL-CCNC: 8 U/L — SIGNIFICANT CHANGE UP
BASOPHILS # BLD AUTO: 0.03 K/UL — SIGNIFICANT CHANGE UP (ref 0–0.2)
BASOPHILS NFR BLD AUTO: 0.5 % — SIGNIFICANT CHANGE UP (ref 0–2)
BILIRUB SERPL-MCNC: 0.8 MG/DL — SIGNIFICANT CHANGE UP (ref 0.4–2)
BILIRUB SERPL-MCNC: 0.9 MG/DL — SIGNIFICANT CHANGE UP (ref 0.4–2)
BUN SERPL-MCNC: 75 MG/DL — HIGH (ref 8–20)
BUN SERPL-MCNC: 76.7 MG/DL — HIGH (ref 8–20)
CALCIUM SERPL-MCNC: 9 MG/DL — SIGNIFICANT CHANGE UP (ref 8.4–10.5)
CALCIUM SERPL-MCNC: 9.2 MG/DL — SIGNIFICANT CHANGE UP (ref 8.4–10.5)
CHLORIDE SERPL-SCNC: 97 MMOL/L — SIGNIFICANT CHANGE UP (ref 96–108)
CHLORIDE SERPL-SCNC: 99 MMOL/L — SIGNIFICANT CHANGE UP (ref 96–108)
CO2 SERPL-SCNC: 21 MMOL/L — LOW (ref 22–29)
CO2 SERPL-SCNC: 23 MMOL/L — SIGNIFICANT CHANGE UP (ref 22–29)
CREAT SERPL-MCNC: 6.32 MG/DL — HIGH (ref 0.5–1.3)
CREAT SERPL-MCNC: 6.41 MG/DL — HIGH (ref 0.5–1.3)
EGFR: 9 ML/MIN/1.73M2 — LOW
EOSINOPHIL # BLD AUTO: 0.58 K/UL — HIGH (ref 0–0.5)
EOSINOPHIL NFR BLD AUTO: 9.7 % — HIGH (ref 0–6)
GLUCOSE BLDC GLUCOMTR-MCNC: 87 MG/DL — SIGNIFICANT CHANGE UP (ref 70–99)
GLUCOSE SERPL-MCNC: 112 MG/DL — HIGH (ref 70–99)
GLUCOSE SERPL-MCNC: 95 MG/DL — SIGNIFICANT CHANGE UP (ref 70–99)
HCT VFR BLD CALC: 29.1 % — LOW (ref 39–50)
HGB BLD-MCNC: 9 G/DL — LOW (ref 13–17)
IMM GRANULOCYTES # BLD AUTO: 0.02 K/UL — SIGNIFICANT CHANGE UP (ref 0–0.07)
IMM GRANULOCYTES NFR BLD AUTO: 0.3 % — SIGNIFICANT CHANGE UP (ref 0–0.9)
LIDOCAIN IGE QN: 41 U/L — SIGNIFICANT CHANGE UP (ref 22–51)
LYMPHOCYTES # BLD AUTO: 0.81 K/UL — LOW (ref 1–3.3)
LYMPHOCYTES NFR BLD AUTO: 13.6 % — SIGNIFICANT CHANGE UP (ref 13–44)
MAGNESIUM SERPL-MCNC: 1.6 MG/DL — SIGNIFICANT CHANGE UP (ref 1.6–2.6)
MAGNESIUM SERPL-MCNC: 1.8 MG/DL — SIGNIFICANT CHANGE UP (ref 1.6–2.6)
MCHC RBC-ENTMCNC: 30.7 PG — SIGNIFICANT CHANGE UP (ref 27–34)
MCHC RBC-ENTMCNC: 30.9 G/DL — LOW (ref 32–36)
MCV RBC AUTO: 99.3 FL — SIGNIFICANT CHANGE UP (ref 80–100)
MONOCYTES # BLD AUTO: 0.34 K/UL — SIGNIFICANT CHANGE UP (ref 0–0.9)
MONOCYTES NFR BLD AUTO: 5.7 % — SIGNIFICANT CHANGE UP (ref 2–14)
MRSA PCR RESULT.: SIGNIFICANT CHANGE UP
NEUTROPHILS # BLD AUTO: 4.19 K/UL — SIGNIFICANT CHANGE UP (ref 1.8–7.4)
NEUTROPHILS NFR BLD AUTO: 70.2 % — SIGNIFICANT CHANGE UP (ref 43–77)
NRBC # BLD AUTO: 0 K/UL — SIGNIFICANT CHANGE UP (ref 0–0)
NRBC # FLD: 0 K/UL — SIGNIFICANT CHANGE UP (ref 0–0)
NRBC BLD AUTO-RTO: 0 /100 WBCS — SIGNIFICANT CHANGE UP (ref 0–0)
NT-PROBNP SERPL-SCNC: HIGH PG/ML (ref 0–300)
PHOSPHATE SERPL-MCNC: 4.2 MG/DL — SIGNIFICANT CHANGE UP (ref 2.4–4.7)
PLATELET # BLD AUTO: 135 K/UL — LOW (ref 150–400)
PMV BLD: 10.4 FL — SIGNIFICANT CHANGE UP (ref 7–13)
POTASSIUM SERPL-MCNC: 4.3 MMOL/L — SIGNIFICANT CHANGE UP (ref 3.5–5.3)
POTASSIUM SERPL-MCNC: 5.1 MMOL/L — SIGNIFICANT CHANGE UP (ref 3.5–5.3)
POTASSIUM SERPL-SCNC: 4.3 MMOL/L — SIGNIFICANT CHANGE UP (ref 3.5–5.3)
POTASSIUM SERPL-SCNC: 5.1 MMOL/L — SIGNIFICANT CHANGE UP (ref 3.5–5.3)
PROT SERPL-MCNC: 6.6 G/DL — SIGNIFICANT CHANGE UP (ref 6.6–8.7)
PROT SERPL-MCNC: 6.8 G/DL — SIGNIFICANT CHANGE UP (ref 6.6–8.7)
RBC # BLD: 2.93 M/UL — LOW (ref 4.2–5.8)
RBC # FLD: 15.9 % — HIGH (ref 10.3–14.5)
S AUREUS DNA NOSE QL NAA+PROBE: SIGNIFICANT CHANGE UP
SODIUM SERPL-SCNC: 134 MMOL/L — LOW (ref 135–145)
SODIUM SERPL-SCNC: 139 MMOL/L — SIGNIFICANT CHANGE UP (ref 135–145)
TROPONIN T, HIGH SENSITIVITY RESULT: 89 NG/L — HIGH (ref 0–51)
TROPONIN T, HIGH SENSITIVITY RESULT: 91 NG/L — HIGH (ref 0–51)
WBC # BLD: 5.97 K/UL — SIGNIFICANT CHANGE UP (ref 3.8–10.5)
WBC # FLD AUTO: 5.97 K/UL — SIGNIFICANT CHANGE UP (ref 3.8–10.5)

## 2025-06-27 PROCEDURE — 83690 ASSAY OF LIPASE: CPT

## 2025-06-27 PROCEDURE — 93005 ELECTROCARDIOGRAM TRACING: CPT

## 2025-06-27 PROCEDURE — 71275 CT ANGIOGRAPHY CHEST: CPT

## 2025-06-27 PROCEDURE — A9537: CPT

## 2025-06-27 PROCEDURE — 71045 X-RAY EXAM CHEST 1 VIEW: CPT

## 2025-06-27 PROCEDURE — 74178 CT ABD&PLV WO CNTR FLWD CNTR: CPT

## 2025-06-27 PROCEDURE — 83735 ASSAY OF MAGNESIUM: CPT

## 2025-06-27 PROCEDURE — 99232 SBSQ HOSP IP/OBS MODERATE 35: CPT

## 2025-06-27 PROCEDURE — 78226 HEPATOBILIARY SYSTEM IMAGING: CPT

## 2025-06-27 PROCEDURE — 80053 COMPREHEN METABOLIC PANEL: CPT

## 2025-06-27 PROCEDURE — 76705 ECHO EXAM OF ABDOMEN: CPT

## 2025-06-27 PROCEDURE — 83880 ASSAY OF NATRIURETIC PEPTIDE: CPT

## 2025-06-27 PROCEDURE — 36415 COLL VENOUS BLD VENIPUNCTURE: CPT

## 2025-06-27 PROCEDURE — 87640 STAPH A DNA AMP PROBE: CPT

## 2025-06-27 PROCEDURE — 93010 ELECTROCARDIOGRAM REPORT: CPT

## 2025-06-27 PROCEDURE — 85379 FIBRIN DEGRADATION QUANT: CPT

## 2025-06-27 PROCEDURE — 84484 ASSAY OF TROPONIN QUANT: CPT

## 2025-06-27 PROCEDURE — 99223 1ST HOSP IP/OBS HIGH 75: CPT

## 2025-06-27 PROCEDURE — 82962 GLUCOSE BLOOD TEST: CPT

## 2025-06-27 PROCEDURE — 87641 MR-STAPH DNA AMP PROBE: CPT

## 2025-06-27 PROCEDURE — 85025 COMPLETE CBC W/AUTO DIFF WBC: CPT

## 2025-06-27 PROCEDURE — 78226 HEPATOBILIARY SYSTEM IMAGING: CPT | Mod: 26

## 2025-06-27 PROCEDURE — 84100 ASSAY OF PHOSPHORUS: CPT

## 2025-06-27 PROCEDURE — 99222 1ST HOSP IP/OBS MODERATE 55: CPT | Mod: FS

## 2025-06-27 RX ORDER — ACETAMINOPHEN 500 MG/5ML
1000 LIQUID (ML) ORAL ONCE
Refills: 0 | Status: COMPLETED | OUTPATIENT
Start: 2025-06-27 | End: 2025-06-27

## 2025-06-27 RX ORDER — TAMSULOSIN HYDROCHLORIDE 0.4 MG/1
0.4 CAPSULE ORAL AT BEDTIME
Refills: 0 | Status: DISCONTINUED | OUTPATIENT
Start: 2025-06-27 | End: 2025-06-28

## 2025-06-27 RX ORDER — ASPIRIN 325 MG
1 TABLET ORAL
Refills: 0 | DISCHARGE

## 2025-06-27 RX ORDER — ACETAMINOPHEN 500 MG/5ML
650 LIQUID (ML) ORAL EVERY 6 HOURS
Refills: 0 | Status: DISCONTINUED | OUTPATIENT
Start: 2025-06-27 | End: 2025-06-28

## 2025-06-27 RX ORDER — FOLIC ACID 1 MG/1
1 TABLET ORAL DAILY
Refills: 0 | Status: DISCONTINUED | OUTPATIENT
Start: 2025-06-27 | End: 2025-06-27

## 2025-06-27 RX ORDER — ISOSORBIDE MONONITRATE 60 MG/1
30 TABLET, EXTENDED RELEASE ORAL DAILY
Refills: 0 | Status: DISCONTINUED | OUTPATIENT
Start: 2025-06-27 | End: 2025-06-27

## 2025-06-27 RX ORDER — FOLIC ACID 1 MG/1
1 TABLET ORAL DAILY
Refills: 0 | Status: DISCONTINUED | OUTPATIENT
Start: 2025-06-27 | End: 2025-06-28

## 2025-06-27 RX ORDER — DEXTROSE 50 % IN WATER 50 %
50 SYRINGE (ML) INTRAVENOUS ONCE
Refills: 0 | Status: COMPLETED | OUTPATIENT
Start: 2025-06-27 | End: 2025-06-27

## 2025-06-27 RX ORDER — MELATONIN 5 MG
3 TABLET ORAL AT BEDTIME
Refills: 0 | Status: DISCONTINUED | OUTPATIENT
Start: 2025-06-27 | End: 2025-06-28

## 2025-06-27 RX ORDER — MAGNESIUM, ALUMINUM HYDROXIDE 200-200 MG
30 TABLET,CHEWABLE ORAL EVERY 4 HOURS
Refills: 0 | Status: DISCONTINUED | OUTPATIENT
Start: 2025-06-27 | End: 2025-06-27

## 2025-06-27 RX ORDER — FERROUS SULFATE 137(45) MG
325 TABLET, EXTENDED RELEASE ORAL DAILY
Refills: 0 | Status: DISCONTINUED | OUTPATIENT
Start: 2025-06-27 | End: 2025-06-27

## 2025-06-27 RX ORDER — ONDANSETRON HCL/PF 4 MG/2 ML
4 VIAL (ML) INJECTION EVERY 8 HOURS
Refills: 0 | Status: DISCONTINUED | OUTPATIENT
Start: 2025-06-27 | End: 2025-06-28

## 2025-06-27 RX ORDER — FERROUS SULFATE 137(45) MG
325 TABLET, EXTENDED RELEASE ORAL DAILY
Refills: 0 | Status: DISCONTINUED | OUTPATIENT
Start: 2025-06-27 | End: 2025-06-28

## 2025-06-27 RX ORDER — ACETAMINOPHEN 500 MG/5ML
650 LIQUID (ML) ORAL EVERY 6 HOURS
Refills: 0 | Status: DISCONTINUED | OUTPATIENT
Start: 2025-06-27 | End: 2025-06-27

## 2025-06-27 RX ORDER — MAGNESIUM SULFATE 500 MG/ML
1 SYRINGE (ML) INJECTION ONCE
Refills: 0 | Status: COMPLETED | OUTPATIENT
Start: 2025-06-27 | End: 2025-06-27

## 2025-06-27 RX ORDER — OXYCODONE HYDROCHLORIDE AND ACETAMINOPHEN 10; 325 MG/1; MG/1
1 TABLET ORAL ONCE
Refills: 0 | Status: DISCONTINUED | OUTPATIENT
Start: 2025-06-27 | End: 2025-06-27

## 2025-06-27 RX ORDER — ISOSORBIDE MONONITRATE 60 MG/1
30 TABLET, EXTENDED RELEASE ORAL DAILY
Refills: 0 | Status: DISCONTINUED | OUTPATIENT
Start: 2025-06-27 | End: 2025-06-28

## 2025-06-27 RX ORDER — TAMSULOSIN HYDROCHLORIDE 0.4 MG/1
0.4 CAPSULE ORAL AT BEDTIME
Refills: 0 | Status: DISCONTINUED | OUTPATIENT
Start: 2025-06-27 | End: 2025-06-27

## 2025-06-27 RX ORDER — CARVEDILOL 3.12 MG/1
6.25 TABLET, FILM COATED ORAL EVERY 12 HOURS
Refills: 0 | Status: DISCONTINUED | OUTPATIENT
Start: 2025-06-27 | End: 2025-06-28

## 2025-06-27 RX ORDER — LEVETIRACETAM 10 MG/ML
500 INJECTION, SOLUTION INTRAVENOUS EVERY 12 HOURS
Refills: 0 | Status: DISCONTINUED | OUTPATIENT
Start: 2025-06-27 | End: 2025-06-28

## 2025-06-27 RX ORDER — ATORVASTATIN CALCIUM 80 MG/1
40 TABLET, FILM COATED ORAL AT BEDTIME
Refills: 0 | Status: DISCONTINUED | OUTPATIENT
Start: 2025-06-27 | End: 2025-06-27

## 2025-06-27 RX ORDER — CARVEDILOL 3.12 MG/1
6.25 TABLET, FILM COATED ORAL EVERY 12 HOURS
Refills: 0 | Status: DISCONTINUED | OUTPATIENT
Start: 2025-06-27 | End: 2025-06-27

## 2025-06-27 RX ORDER — MELATONIN 5 MG
3 TABLET ORAL AT BEDTIME
Refills: 0 | Status: DISCONTINUED | OUTPATIENT
Start: 2025-06-27 | End: 2025-06-27

## 2025-06-27 RX ORDER — SACUBITRIL AND VALSARTAN 6; 6 MG/1; MG/1
1 PELLET ORAL
Refills: 0 | Status: DISCONTINUED | OUTPATIENT
Start: 2025-06-27 | End: 2025-06-27

## 2025-06-27 RX ORDER — LEVETIRACETAM 10 MG/ML
500 INJECTION, SOLUTION INTRAVENOUS
Refills: 0 | Status: DISCONTINUED | OUTPATIENT
Start: 2025-06-27 | End: 2025-06-27

## 2025-06-27 RX ORDER — ATORVASTATIN CALCIUM 80 MG/1
40 TABLET, FILM COATED ORAL AT BEDTIME
Refills: 0 | Status: DISCONTINUED | OUTPATIENT
Start: 2025-06-27 | End: 2025-06-28

## 2025-06-27 RX ORDER — SACUBITRIL AND VALSARTAN 6; 6 MG/1; MG/1
1 PELLET ORAL
Refills: 0 | Status: DISCONTINUED | OUTPATIENT
Start: 2025-06-27 | End: 2025-06-28

## 2025-06-27 RX ORDER — MAGNESIUM, ALUMINUM HYDROXIDE 200-200 MG
30 TABLET,CHEWABLE ORAL EVERY 4 HOURS
Refills: 0 | Status: DISCONTINUED | OUTPATIENT
Start: 2025-06-27 | End: 2025-06-28

## 2025-06-27 RX ADMIN — Medication 40 MILLIGRAM(S): at 21:23

## 2025-06-27 RX ADMIN — Medication 4 MILLIGRAM(S): at 04:19

## 2025-06-27 RX ADMIN — Medication 400 MILLIGRAM(S): at 23:40

## 2025-06-27 RX ADMIN — Medication 100 GRAM(S): at 10:03

## 2025-06-27 RX ADMIN — Medication 50 MILLILITER(S): at 01:14

## 2025-06-27 RX ADMIN — Medication 400 MILLIGRAM(S): at 17:59

## 2025-06-27 RX ADMIN — SACUBITRIL AND VALSARTAN 1 TABLET(S): 6; 6 PELLET ORAL at 21:23

## 2025-06-27 RX ADMIN — LEVETIRACETAM 500 MILLIGRAM(S): 10 INJECTION, SOLUTION INTRAVENOUS at 21:24

## 2025-06-27 RX ADMIN — Medication 2 MILLIGRAM(S): at 03:44

## 2025-06-27 RX ADMIN — Medication 1000 MILLIGRAM(S): at 18:30

## 2025-06-27 RX ADMIN — Medication 1 APPLICATION(S): at 10:17

## 2025-06-27 RX ADMIN — Medication 400 MILLIGRAM(S): at 10:28

## 2025-06-27 RX ADMIN — Medication 400 MILLIGRAM(S): at 07:06

## 2025-06-27 RX ADMIN — CARVEDILOL 6.25 MILLIGRAM(S): 3.12 TABLET, FILM COATED ORAL at 21:23

## 2025-06-27 RX ADMIN — Medication 4 MILLIGRAM(S): at 10:03

## 2025-06-27 RX ADMIN — Medication 4 MILLIGRAM(S): at 04:06

## 2025-06-27 RX ADMIN — ATORVASTATIN CALCIUM 40 MILLIGRAM(S): 80 TABLET, FILM COATED ORAL at 21:23

## 2025-06-27 RX ADMIN — Medication 667 MILLIGRAM(S): at 21:23

## 2025-06-27 RX ADMIN — Medication 2 MILLIGRAM(S): at 00:43

## 2025-06-27 RX ADMIN — TAMSULOSIN HYDROCHLORIDE 0.4 MILLIGRAM(S): 0.4 CAPSULE ORAL at 21:23

## 2025-06-27 RX ADMIN — Medication 1000 MILLIGRAM(S): at 11:25

## 2025-06-27 RX ADMIN — Medication 1 APPLICATION(S): at 16:00

## 2025-06-28 ENCOUNTER — TRANSCRIPTION ENCOUNTER (OUTPATIENT)
Age: 65
End: 2025-06-28

## 2025-06-28 VITALS
TEMPERATURE: 98 F | OXYGEN SATURATION: 95 % | HEART RATE: 62 BPM | SYSTOLIC BLOOD PRESSURE: 157 MMHG | DIASTOLIC BLOOD PRESSURE: 64 MMHG | RESPIRATION RATE: 18 BRPM

## 2025-06-28 LAB
ALBUMIN SERPL ELPH-MCNC: 3.2 G/DL — LOW (ref 3.3–5.2)
ALP SERPL-CCNC: 97 U/L — SIGNIFICANT CHANGE UP (ref 40–120)
ALT FLD-CCNC: 9 U/L — SIGNIFICANT CHANGE UP
ANION GAP SERPL CALC-SCNC: 14 MMOL/L — SIGNIFICANT CHANGE UP (ref 5–17)
AST SERPL-CCNC: 7 U/L — SIGNIFICANT CHANGE UP
BASOPHILS # BLD AUTO: 0.02 K/UL — SIGNIFICANT CHANGE UP (ref 0–0.2)
BASOPHILS NFR BLD AUTO: 0.5 % — SIGNIFICANT CHANGE UP (ref 0–2)
BILIRUB SERPL-MCNC: 1 MG/DL — SIGNIFICANT CHANGE UP (ref 0.4–2)
BUN SERPL-MCNC: 39.7 MG/DL — HIGH (ref 8–20)
CALCIUM SERPL-MCNC: 8.4 MG/DL — SIGNIFICANT CHANGE UP (ref 8.4–10.5)
CHLORIDE SERPL-SCNC: 99 MMOL/L — SIGNIFICANT CHANGE UP (ref 96–108)
CO2 SERPL-SCNC: 25 MMOL/L — SIGNIFICANT CHANGE UP (ref 22–29)
CREAT SERPL-MCNC: 4.64 MG/DL — HIGH (ref 0.5–1.3)
EGFR: 13 ML/MIN/1.73M2 — LOW
EGFR: 13 ML/MIN/1.73M2 — LOW
EOSINOPHIL # BLD AUTO: 0.5 K/UL — SIGNIFICANT CHANGE UP (ref 0–0.5)
EOSINOPHIL NFR BLD AUTO: 12 % — HIGH (ref 0–6)
GLUCOSE SERPL-MCNC: 79 MG/DL — SIGNIFICANT CHANGE UP (ref 70–99)
HBV SURFACE AG SER-ACNC: SIGNIFICANT CHANGE UP
HCT VFR BLD CALC: 30 % — LOW (ref 39–50)
HGB BLD-MCNC: 9.1 G/DL — LOW (ref 13–17)
IMM GRANULOCYTES # BLD AUTO: 0.01 K/UL — SIGNIFICANT CHANGE UP (ref 0–0.07)
IMM GRANULOCYTES NFR BLD AUTO: 0.2 % — SIGNIFICANT CHANGE UP (ref 0–0.9)
LYMPHOCYTES # BLD AUTO: 0.74 K/UL — LOW (ref 1–3.3)
LYMPHOCYTES NFR BLD AUTO: 17.8 % — SIGNIFICANT CHANGE UP (ref 13–44)
MAGNESIUM SERPL-MCNC: 1.8 MG/DL — SIGNIFICANT CHANGE UP (ref 1.6–2.6)
MCHC RBC-ENTMCNC: 30.1 PG — SIGNIFICANT CHANGE UP (ref 27–34)
MCHC RBC-ENTMCNC: 30.3 G/DL — LOW (ref 32–36)
MCV RBC AUTO: 99.3 FL — SIGNIFICANT CHANGE UP (ref 80–100)
MONOCYTES # BLD AUTO: 0.37 K/UL — SIGNIFICANT CHANGE UP (ref 0–0.9)
MONOCYTES NFR BLD AUTO: 8.9 % — SIGNIFICANT CHANGE UP (ref 2–14)
NEUTROPHILS # BLD AUTO: 2.51 K/UL — SIGNIFICANT CHANGE UP (ref 1.8–7.4)
NEUTROPHILS NFR BLD AUTO: 60.6 % — SIGNIFICANT CHANGE UP (ref 43–77)
NRBC # BLD AUTO: 0 K/UL — SIGNIFICANT CHANGE UP (ref 0–0)
NRBC # FLD: 0 K/UL — SIGNIFICANT CHANGE UP (ref 0–0)
NRBC BLD AUTO-RTO: 0 /100 WBCS — SIGNIFICANT CHANGE UP (ref 0–0)
PHOSPHATE SERPL-MCNC: 4 MG/DL — SIGNIFICANT CHANGE UP (ref 2.4–4.7)
PLATELET # BLD AUTO: 121 K/UL — LOW (ref 150–400)
PMV BLD: 10.9 FL — SIGNIFICANT CHANGE UP (ref 7–13)
POTASSIUM SERPL-MCNC: 4.7 MMOL/L — SIGNIFICANT CHANGE UP (ref 3.5–5.3)
POTASSIUM SERPL-SCNC: 4.7 MMOL/L — SIGNIFICANT CHANGE UP (ref 3.5–5.3)
PROT SERPL-MCNC: 6.2 G/DL — LOW (ref 6.6–8.7)
RBC # BLD: 3.02 M/UL — LOW (ref 4.2–5.8)
RBC # FLD: 15.6 % — HIGH (ref 10.3–14.5)
SODIUM SERPL-SCNC: 138 MMOL/L — SIGNIFICANT CHANGE UP (ref 135–145)
WBC # BLD: 4.15 K/UL — SIGNIFICANT CHANGE UP (ref 3.8–10.5)
WBC # FLD AUTO: 4.15 K/UL — SIGNIFICANT CHANGE UP (ref 3.8–10.5)

## 2025-06-28 PROCEDURE — A9537: CPT

## 2025-06-28 PROCEDURE — 76705 ECHO EXAM OF ABDOMEN: CPT

## 2025-06-28 PROCEDURE — 83880 ASSAY OF NATRIURETIC PEPTIDE: CPT

## 2025-06-28 PROCEDURE — 74178 CT ABD&PLV WO CNTR FLWD CNTR: CPT

## 2025-06-28 PROCEDURE — 36415 COLL VENOUS BLD VENIPUNCTURE: CPT

## 2025-06-28 PROCEDURE — 71045 X-RAY EXAM CHEST 1 VIEW: CPT

## 2025-06-28 PROCEDURE — 84100 ASSAY OF PHOSPHORUS: CPT

## 2025-06-28 PROCEDURE — 71275 CT ANGIOGRAPHY CHEST: CPT

## 2025-06-28 PROCEDURE — 84484 ASSAY OF TROPONIN QUANT: CPT

## 2025-06-28 PROCEDURE — 87640 STAPH A DNA AMP PROBE: CPT

## 2025-06-28 PROCEDURE — 99285 EMERGENCY DEPT VISIT HI MDM: CPT

## 2025-06-28 PROCEDURE — 96375 TX/PRO/DX INJ NEW DRUG ADDON: CPT

## 2025-06-28 PROCEDURE — 85379 FIBRIN DEGRADATION QUANT: CPT

## 2025-06-28 PROCEDURE — 85025 COMPLETE CBC W/AUTO DIFF WBC: CPT

## 2025-06-28 PROCEDURE — 87641 MR-STAPH DNA AMP PROBE: CPT

## 2025-06-28 PROCEDURE — 83690 ASSAY OF LIPASE: CPT

## 2025-06-28 PROCEDURE — 82962 GLUCOSE BLOOD TEST: CPT

## 2025-06-28 PROCEDURE — 80053 COMPREHEN METABOLIC PANEL: CPT

## 2025-06-28 PROCEDURE — 83735 ASSAY OF MAGNESIUM: CPT

## 2025-06-28 PROCEDURE — 87340 HEPATITIS B SURFACE AG IA: CPT

## 2025-06-28 PROCEDURE — 99261: CPT

## 2025-06-28 PROCEDURE — 93005 ELECTROCARDIOGRAM TRACING: CPT

## 2025-06-28 PROCEDURE — 78226 HEPATOBILIARY SYSTEM IMAGING: CPT

## 2025-06-28 PROCEDURE — 96374 THER/PROPH/DIAG INJ IV PUSH: CPT

## 2025-06-28 PROCEDURE — 96376 TX/PRO/DX INJ SAME DRUG ADON: CPT

## 2025-06-28 PROCEDURE — 99239 HOSP IP/OBS DSCHRG MGMT >30: CPT

## 2025-06-28 RX ADMIN — CARVEDILOL 6.25 MILLIGRAM(S): 3.12 TABLET, FILM COATED ORAL at 06:38

## 2025-06-28 RX ADMIN — SACUBITRIL AND VALSARTAN 1 TABLET(S): 6; 6 PELLET ORAL at 06:39

## 2025-06-28 RX ADMIN — Medication 1000 MILLIGRAM(S): at 00:40

## 2025-06-28 RX ADMIN — Medication 325 MILLIGRAM(S): at 11:42

## 2025-06-28 RX ADMIN — ISOSORBIDE MONONITRATE 30 MILLIGRAM(S): 60 TABLET, EXTENDED RELEASE ORAL at 11:42

## 2025-06-28 RX ADMIN — Medication 40 MILLIGRAM(S): at 06:39

## 2025-06-28 RX ADMIN — LEVETIRACETAM 500 MILLIGRAM(S): 10 INJECTION, SOLUTION INTRAVENOUS at 06:39

## 2025-06-28 RX ADMIN — FOLIC ACID 1 MILLIGRAM(S): 1 TABLET ORAL at 11:42

## 2025-06-28 RX ADMIN — Medication 1 APPLICATION(S): at 11:42

## 2025-07-03 ENCOUNTER — APPOINTMENT (OUTPATIENT)
Dept: CARDIOLOGY | Facility: CLINIC | Age: 65
End: 2025-07-03

## 2025-07-09 ENCOUNTER — APPOINTMENT (OUTPATIENT)
Dept: HEART FAILURE | Facility: CLINIC | Age: 65
End: 2025-07-09
Payer: MEDICARE

## 2025-07-09 VITALS
HEART RATE: 60 BPM | BODY MASS INDEX: 21.52 KG/M2 | WEIGHT: 142 LBS | HEIGHT: 68 IN | OXYGEN SATURATION: 95 % | SYSTOLIC BLOOD PRESSURE: 96 MMHG | DIASTOLIC BLOOD PRESSURE: 60 MMHG

## 2025-07-09 PROBLEM — I50.20 HFREF (HEART FAILURE WITH REDUCED EJECTION FRACTION): Status: ACTIVE | Noted: 2025-06-20

## 2025-07-09 PROCEDURE — 99214 OFFICE O/P EST MOD 30 MIN: CPT

## 2025-07-09 RX ORDER — SACUBITRIL AND VALSARTAN 24; 26 MG/1; MG/1
24-26 TABLET, FILM COATED ORAL TWICE DAILY
Qty: 60 | Refills: 5 | Status: ACTIVE | COMMUNITY
Start: 2025-07-09

## 2025-07-09 RX ORDER — TAMSULOSIN HYDROCHLORIDE 0.4 MG/1
0.4 CAPSULE ORAL
Qty: 30 | Refills: 0 | Status: ACTIVE | COMMUNITY
Start: 2025-07-09

## 2025-07-17 PROCEDURE — 90935 HEMODIALYSIS ONE EVALUATION: CPT

## 2025-07-17 PROCEDURE — 85025 COMPLETE CBC W/AUTO DIFF WBC: CPT

## 2025-07-17 PROCEDURE — 71250 CT THORAX DX C-: CPT | Mod: MC

## 2025-07-17 PROCEDURE — 71045 X-RAY EXAM CHEST 1 VIEW: CPT

## 2025-07-17 PROCEDURE — 99285 EMERGENCY DEPT VISIT HI MDM: CPT | Mod: 25

## 2025-07-17 PROCEDURE — 99261: CPT

## 2025-07-17 PROCEDURE — 36415 COLL VENOUS BLD VENIPUNCTURE: CPT

## 2025-07-17 PROCEDURE — 80053 COMPREHEN METABOLIC PANEL: CPT

## 2025-07-17 PROCEDURE — 94640 AIRWAY INHALATION TREATMENT: CPT

## 2025-07-17 PROCEDURE — 76937 US GUIDE VASCULAR ACCESS: CPT

## 2025-07-17 PROCEDURE — 0225U NFCT DS DNA&RNA 21 SARSCOV2: CPT

## 2025-07-17 PROCEDURE — 96365 THER/PROPH/DIAG IV INF INIT: CPT

## 2025-07-17 PROCEDURE — 36410 VNPNXR 3YR/> PHY/QHP DX/THER: CPT

## 2025-07-17 PROCEDURE — 96366 THER/PROPH/DIAG IV INF ADDON: CPT

## 2025-07-17 PROCEDURE — 93005 ELECTROCARDIOGRAM TRACING: CPT

## 2025-07-17 PROCEDURE — 96375 TX/PRO/DX INJ NEW DRUG ADDON: CPT

## 2025-07-20 ENCOUNTER — INPATIENT (INPATIENT)
Facility: HOSPITAL | Age: 65
LOS: 7 days | Discharge: HOME | DRG: 392 | End: 2025-07-28
Attending: STUDENT IN AN ORGANIZED HEALTH CARE EDUCATION/TRAINING PROGRAM | Admitting: STUDENT IN AN ORGANIZED HEALTH CARE EDUCATION/TRAINING PROGRAM
Payer: MEDICARE

## 2025-07-20 VITALS
DIASTOLIC BLOOD PRESSURE: 86 MMHG | RESPIRATION RATE: 22 BRPM | TEMPERATURE: 99 F | OXYGEN SATURATION: 91 % | SYSTOLIC BLOOD PRESSURE: 193 MMHG | HEART RATE: 80 BPM | HEIGHT: 68 IN | WEIGHT: 139.11 LBS

## 2025-07-20 DIAGNOSIS — I77.0 ARTERIOVENOUS FISTULA, ACQUIRED: Chronic | ICD-10-CM

## 2025-07-20 DIAGNOSIS — Z95.2 PRESENCE OF PROSTHETIC HEART VALVE: Chronic | ICD-10-CM

## 2025-07-20 DIAGNOSIS — Z95.818 PRESENCE OF OTHER CARDIAC IMPLANTS AND GRAFTS: Chronic | ICD-10-CM

## 2025-07-20 DIAGNOSIS — Z86.79 PERSONAL HISTORY OF OTHER DISEASES OF THE CIRCULATORY SYSTEM: Chronic | ICD-10-CM

## 2025-07-20 DIAGNOSIS — Z90.49 ACQUIRED ABSENCE OF OTHER SPECIFIED PARTS OF DIGESTIVE TRACT: Chronic | ICD-10-CM

## 2025-07-20 DIAGNOSIS — Z94.0 KIDNEY TRANSPLANT STATUS: Chronic | ICD-10-CM

## 2025-07-20 DIAGNOSIS — Z95.1 PRESENCE OF AORTOCORONARY BYPASS GRAFT: Chronic | ICD-10-CM

## 2025-07-20 LAB
ALBUMIN SERPL ELPH-MCNC: 4.1 G/DL — SIGNIFICANT CHANGE UP (ref 3.3–5.2)
ALP SERPL-CCNC: 111 U/L — SIGNIFICANT CHANGE UP (ref 40–120)
ALT FLD-CCNC: 8 U/L — SIGNIFICANT CHANGE UP
ANION GAP SERPL CALC-SCNC: 18 MMOL/L — HIGH (ref 5–17)
ANISOCYTOSIS BLD QL: SLIGHT — SIGNIFICANT CHANGE UP
AST SERPL-CCNC: 15 U/L — SIGNIFICANT CHANGE UP
BASE EXCESS BLDA CALC-SCNC: 2.1 MMOL/L — SIGNIFICANT CHANGE UP (ref -2–3)
BASOPHILS # BLD AUTO: 0.02 K/UL — SIGNIFICANT CHANGE UP (ref 0–0.2)
BASOPHILS # BLD MANUAL: 0.05 K/UL — SIGNIFICANT CHANGE UP (ref 0–0.2)
BASOPHILS NFR BLD AUTO: 0.4 % — SIGNIFICANT CHANGE UP (ref 0–2)
BASOPHILS NFR BLD MANUAL: 0.9 % — SIGNIFICANT CHANGE UP (ref 0–2)
BILIRUB SERPL-MCNC: 1.5 MG/DL — SIGNIFICANT CHANGE UP (ref 0.4–2)
BLOOD GAS COMMENTS ARTERIAL: SIGNIFICANT CHANGE UP
BUN SERPL-MCNC: 41.1 MG/DL — HIGH (ref 8–20)
CALCIUM SERPL-MCNC: 8.9 MG/DL — SIGNIFICANT CHANGE UP (ref 8.4–10.5)
CHLORIDE SERPL-SCNC: 99 MMOL/L — SIGNIFICANT CHANGE UP (ref 96–108)
CO2 SERPL-SCNC: 23 MMOL/L — SIGNIFICANT CHANGE UP (ref 22–29)
CREAT SERPL-MCNC: 5.07 MG/DL — HIGH (ref 0.5–1.3)
EGFR: 12 ML/MIN/1.73M2 — LOW
EGFR: 12 ML/MIN/1.73M2 — LOW
EOSINOPHIL # BLD AUTO: 0.03 K/UL — SIGNIFICANT CHANGE UP (ref 0–0.5)
EOSINOPHIL # BLD MANUAL: 0 K/UL — SIGNIFICANT CHANGE UP (ref 0–0.5)
EOSINOPHIL NFR BLD AUTO: 0.6 % — SIGNIFICANT CHANGE UP (ref 0–6)
EOSINOPHIL NFR BLD MANUAL: 0 % — SIGNIFICANT CHANGE UP (ref 0–6)
GAS PNL BLDA: SIGNIFICANT CHANGE UP
GIANT PLATELETS BLD QL SMEAR: PRESENT
GLUCOSE SERPL-MCNC: 102 MG/DL — HIGH (ref 70–99)
HCO3 BLDA-SCNC: 27 MMOL/L — SIGNIFICANT CHANGE UP (ref 21–28)
HCT VFR BLD CALC: 32.5 % — LOW (ref 39–50)
HCT VFR BLD CALC: 33.6 % — LOW (ref 39–50)
HGB BLD-MCNC: 10.2 G/DL — LOW (ref 13–17)
HGB BLD-MCNC: 10.4 G/DL — LOW (ref 13–17)
HOROWITZ INDEX BLDA+IHG-RTO: SIGNIFICANT CHANGE UP
IMM GRANULOCYTES # BLD AUTO: 0.03 K/UL — SIGNIFICANT CHANGE UP (ref 0–0.07)
IMM GRANULOCYTES NFR BLD AUTO: 0.6 % — SIGNIFICANT CHANGE UP (ref 0–0.9)
IMMATURE PLATELET FRACTION #: 2.8 K/UL — LOW (ref 3.9–12.5)
IMMATURE PLATELET FRACTION #: 2.9 K/UL — LOW (ref 3.9–12.5)
IMMATURE PLATELET FRACTION %: 3.2 % — SIGNIFICANT CHANGE UP (ref 1.6–7.1)
IMMATURE PLATELET FRACTION %: 4 % — SIGNIFICANT CHANGE UP (ref 1.6–7.1)
LACTATE SERPL-SCNC: 1 MMOL/L — SIGNIFICANT CHANGE UP (ref 0.5–2)
LIDOCAIN IGE QN: 20 U/L — LOW (ref 22–51)
LYMPHOCYTES # BLD AUTO: 0.27 K/UL — LOW (ref 1–3.3)
LYMPHOCYTES # BLD MANUAL: 0.14 K/UL — LOW (ref 1–3.3)
LYMPHOCYTES NFR BLD AUTO: 5 % — LOW (ref 13–44)
LYMPHOCYTES NFR BLD MANUAL: 2.6 % — LOW (ref 13–44)
MACROCYTES BLD QL: SLIGHT — SIGNIFICANT CHANGE UP
MANUAL SMEAR VERIFICATION: SIGNIFICANT CHANGE UP
MCHC RBC-ENTMCNC: 29.8 PG — SIGNIFICANT CHANGE UP (ref 27–34)
MCHC RBC-ENTMCNC: 29.9 PG — SIGNIFICANT CHANGE UP (ref 27–34)
MCHC RBC-ENTMCNC: 31 G/DL — LOW (ref 32–36)
MCHC RBC-ENTMCNC: 31.4 G/DL — LOW (ref 32–36)
MCV RBC AUTO: 95 FL — SIGNIFICANT CHANGE UP (ref 80–100)
MCV RBC AUTO: 96.6 FL — SIGNIFICANT CHANGE UP (ref 80–100)
MONOCYTES # BLD AUTO: 0.33 K/UL — SIGNIFICANT CHANGE UP (ref 0–0.9)
MONOCYTES # BLD MANUAL: 0.1 K/UL — SIGNIFICANT CHANGE UP (ref 0–0.9)
MONOCYTES NFR BLD AUTO: 6.2 % — SIGNIFICANT CHANGE UP (ref 2–14)
MONOCYTES NFR BLD MANUAL: 1.8 % — LOW (ref 2–14)
NEUTROPHILS # BLD AUTO: 4.67 K/UL — SIGNIFICANT CHANGE UP (ref 1.8–7.4)
NEUTROPHILS # BLD MANUAL: 5.07 K/UL — SIGNIFICANT CHANGE UP (ref 1.8–7.4)
NEUTROPHILS NFR BLD AUTO: 87.2 % — HIGH (ref 43–77)
NEUTROPHILS NFR BLD MANUAL: 94.7 % — HIGH (ref 43–77)
NRBC # BLD AUTO: 0 K/UL — SIGNIFICANT CHANGE UP (ref 0–0)
NRBC # BLD AUTO: 0 K/UL — SIGNIFICANT CHANGE UP (ref 0–0)
NRBC # FLD: 0 K/UL — SIGNIFICANT CHANGE UP (ref 0–0)
NRBC # FLD: 0 K/UL — SIGNIFICANT CHANGE UP (ref 0–0)
NRBC BLD AUTO-RTO: 0 /100 WBCS — SIGNIFICANT CHANGE UP (ref 0–0)
NRBC BLD AUTO-RTO: 0 /100 WBCS — SIGNIFICANT CHANGE UP (ref 0–0)
OVALOCYTES BLD QL SMEAR: SLIGHT — SIGNIFICANT CHANGE UP
PCO2 BLDA: 41 MMHG — SIGNIFICANT CHANGE UP (ref 35–48)
PH BLDA: 7.42 — SIGNIFICANT CHANGE UP (ref 7.35–7.45)
PLAT MORPH BLD: NORMAL — SIGNIFICANT CHANGE UP
PLATELET # BLD AUTO: 72 K/UL — LOW (ref 150–400)
PLATELET # BLD AUTO: 88 K/UL — LOW (ref 150–400)
PMV BLD: 11 FL — SIGNIFICANT CHANGE UP (ref 7–13)
PMV BLD: 12.1 FL — SIGNIFICANT CHANGE UP (ref 7–13)
PO2 BLDA: 122 MMHG — HIGH (ref 83–108)
POIKILOCYTOSIS BLD QL AUTO: SLIGHT — SIGNIFICANT CHANGE UP
POLYCHROMASIA BLD QL SMEAR: SLIGHT — SIGNIFICANT CHANGE UP
POTASSIUM SERPL-MCNC: 4.2 MMOL/L — SIGNIFICANT CHANGE UP (ref 3.5–5.3)
POTASSIUM SERPL-SCNC: 4.2 MMOL/L — SIGNIFICANT CHANGE UP (ref 3.5–5.3)
PROT SERPL-MCNC: 7.4 G/DL — SIGNIFICANT CHANGE UP (ref 6.6–8.7)
RBC # BLD: 3.42 M/UL — LOW (ref 4.2–5.8)
RBC # BLD: 3.48 M/UL — LOW (ref 4.2–5.8)
RBC # FLD: 15.8 % — HIGH (ref 10.3–14.5)
RBC # FLD: 15.9 % — HIGH (ref 10.3–14.5)
RBC BLD AUTO: ABNORMAL
SAO2 % BLDA: 99.8 % — HIGH (ref 94–98)
SODIUM SERPL-SCNC: 140 MMOL/L — SIGNIFICANT CHANGE UP (ref 135–145)
WBC # BLD: 3.5 K/UL — LOW (ref 3.8–10.5)
WBC # BLD: 5.35 K/UL — SIGNIFICANT CHANGE UP (ref 3.8–10.5)
WBC # FLD AUTO: 3.5 K/UL — LOW (ref 3.8–10.5)
WBC # FLD AUTO: 5.35 K/UL — SIGNIFICANT CHANGE UP (ref 3.8–10.5)

## 2025-07-20 PROCEDURE — 74176 CT ABD & PELVIS W/O CONTRAST: CPT | Mod: 26

## 2025-07-20 PROCEDURE — 99285 EMERGENCY DEPT VISIT HI MDM: CPT

## 2025-07-20 PROCEDURE — 36410 VNPNXR 3YR/> PHY/QHP DX/THER: CPT

## 2025-07-20 PROCEDURE — 74022 RADEX COMPL AQT ABD SERIES: CPT | Mod: 26

## 2025-07-20 PROCEDURE — 93010 ELECTROCARDIOGRAM REPORT: CPT

## 2025-07-20 PROCEDURE — 71045 X-RAY EXAM CHEST 1 VIEW: CPT | Mod: 26,XE

## 2025-07-20 RX ORDER — ONDANSETRON HCL/PF 4 MG/2 ML
4 VIAL (ML) INJECTION ONCE
Refills: 0 | Status: COMPLETED | OUTPATIENT
Start: 2025-07-20 | End: 2025-07-20

## 2025-07-20 RX ORDER — ACETAMINOPHEN 500 MG/5ML
1000 LIQUID (ML) ORAL ONCE
Refills: 0 | Status: COMPLETED | OUTPATIENT
Start: 2025-07-20 | End: 2025-07-21

## 2025-07-20 RX ORDER — SODIUM CHLORIDE 9 G/1000ML
250 INJECTION, SOLUTION INTRAVENOUS ONCE
Refills: 0 | Status: COMPLETED | OUTPATIENT
Start: 2025-07-20 | End: 2025-07-20

## 2025-07-20 RX ORDER — HYDROMORPHONE/SOD CHLOR,ISO/PF 2 MG/10 ML
1 SYRINGE (ML) INJECTION ONCE
Refills: 0 | Status: DISCONTINUED | OUTPATIENT
Start: 2025-07-20 | End: 2025-07-20

## 2025-07-20 RX ORDER — SODIUM CHLORIDE 9 G/1000ML
1950 INJECTION, SOLUTION INTRAVENOUS ONCE
Refills: 0 | Status: COMPLETED | OUTPATIENT
Start: 2025-07-20 | End: 2025-07-20

## 2025-07-20 RX ORDER — METOCLOPRAMIDE HCL 10 MG
10 TABLET ORAL ONCE
Refills: 0 | Status: COMPLETED | OUTPATIENT
Start: 2025-07-20 | End: 2025-07-20

## 2025-07-20 RX ADMIN — Medication 4 MILLIGRAM(S): at 13:06

## 2025-07-20 RX ADMIN — Medication 10 MILLIGRAM(S): at 20:59

## 2025-07-20 RX ADMIN — Medication 4 MILLIGRAM(S): at 17:26

## 2025-07-20 RX ADMIN — Medication 2 MILLIGRAM(S): at 17:26

## 2025-07-20 RX ADMIN — Medication 1 MILLIGRAM(S): at 21:07

## 2025-07-20 RX ADMIN — Medication 4 MILLIGRAM(S): at 13:07

## 2025-07-21 LAB
ALBUMIN SERPL ELPH-MCNC: 4 G/DL — SIGNIFICANT CHANGE UP (ref 3.3–5.2)
ALP SERPL-CCNC: 97 U/L — SIGNIFICANT CHANGE UP (ref 40–120)
ALT FLD-CCNC: 12 U/L — SIGNIFICANT CHANGE UP
ANION GAP SERPL CALC-SCNC: 17 MMOL/L — SIGNIFICANT CHANGE UP (ref 5–17)
AST SERPL-CCNC: 15 U/L — SIGNIFICANT CHANGE UP
BASOPHILS # BLD AUTO: 0.02 K/UL — SIGNIFICANT CHANGE UP (ref 0–0.2)
BASOPHILS NFR BLD AUTO: 0.3 % — SIGNIFICANT CHANGE UP (ref 0–2)
BILIRUB SERPL-MCNC: 1 MG/DL — SIGNIFICANT CHANGE UP (ref 0.4–2)
BUN SERPL-MCNC: 50.8 MG/DL — HIGH (ref 8–20)
CALCIUM SERPL-MCNC: 8.5 MG/DL — SIGNIFICANT CHANGE UP (ref 8.4–10.5)
CHLORIDE SERPL-SCNC: 100 MMOL/L — SIGNIFICANT CHANGE UP (ref 96–108)
CO2 SERPL-SCNC: 24 MMOL/L — SIGNIFICANT CHANGE UP (ref 22–29)
CREAT SERPL-MCNC: 6.1 MG/DL — HIGH (ref 0.5–1.3)
EGFR: 10 ML/MIN/1.73M2 — LOW
EGFR: 10 ML/MIN/1.73M2 — LOW
EOSINOPHIL # BLD AUTO: 0 K/UL — SIGNIFICANT CHANGE UP (ref 0–0.5)
EOSINOPHIL NFR BLD AUTO: 0 % — SIGNIFICANT CHANGE UP (ref 0–6)
GLUCOSE SERPL-MCNC: 91 MG/DL — SIGNIFICANT CHANGE UP (ref 70–99)
HCT VFR BLD CALC: 35.7 % — LOW (ref 39–50)
HGB BLD-MCNC: 10.9 G/DL — LOW (ref 13–17)
IMM GRANULOCYTES # BLD AUTO: 0.02 K/UL — SIGNIFICANT CHANGE UP (ref 0–0.07)
IMM GRANULOCYTES NFR BLD AUTO: 0.3 % — SIGNIFICANT CHANGE UP (ref 0–0.9)
IMMATURE PLATELET FRACTION #: 3.3 K/UL — LOW (ref 3.9–12.5)
IMMATURE PLATELET FRACTION %: 4.9 % — SIGNIFICANT CHANGE UP (ref 1.6–7.1)
LYMPHOCYTES # BLD AUTO: 0.56 K/UL — LOW (ref 1–3.3)
LYMPHOCYTES NFR BLD AUTO: 9.2 % — LOW (ref 13–44)
MAGNESIUM SERPL-MCNC: 2 MG/DL — SIGNIFICANT CHANGE UP (ref 1.6–2.6)
MCHC RBC-ENTMCNC: 30.2 PG — SIGNIFICANT CHANGE UP (ref 27–34)
MCHC RBC-ENTMCNC: 30.5 G/DL — LOW (ref 32–36)
MCV RBC AUTO: 98.9 FL — SIGNIFICANT CHANGE UP (ref 80–100)
MONOCYTES # BLD AUTO: 0.39 K/UL — SIGNIFICANT CHANGE UP (ref 0–0.9)
MONOCYTES NFR BLD AUTO: 6.4 % — SIGNIFICANT CHANGE UP (ref 2–14)
MRSA PCR RESULT.: SIGNIFICANT CHANGE UP
NEUTROPHILS # BLD AUTO: 5.12 K/UL — SIGNIFICANT CHANGE UP (ref 1.8–7.4)
NEUTROPHILS NFR BLD AUTO: 83.8 % — HIGH (ref 43–77)
NRBC # BLD AUTO: 0 K/UL — SIGNIFICANT CHANGE UP (ref 0–0)
NRBC # FLD: 0 K/UL — SIGNIFICANT CHANGE UP (ref 0–0)
NRBC BLD AUTO-RTO: 0 /100 WBCS — SIGNIFICANT CHANGE UP (ref 0–0)
PHOSPHATE SERPL-MCNC: 7 MG/DL — HIGH (ref 2.4–4.7)
PLATELET # BLD AUTO: 68 K/UL — LOW (ref 150–400)
PMV BLD: 12.9 FL — SIGNIFICANT CHANGE UP (ref 7–13)
POTASSIUM SERPL-MCNC: 4.5 MMOL/L — SIGNIFICANT CHANGE UP (ref 3.5–5.3)
POTASSIUM SERPL-SCNC: 4.5 MMOL/L — SIGNIFICANT CHANGE UP (ref 3.5–5.3)
PROT SERPL-MCNC: 7.1 G/DL — SIGNIFICANT CHANGE UP (ref 6.6–8.7)
RAPID RVP RESULT: DETECTED
RBC # BLD: 3.61 M/UL — LOW (ref 4.2–5.8)
RBC # FLD: 15.9 % — HIGH (ref 10.3–14.5)
S AUREUS DNA NOSE QL NAA+PROBE: DETECTED
SARS-COV-2 RNA SPEC QL NAA+PROBE: DETECTED
SODIUM SERPL-SCNC: 141 MMOL/L — SIGNIFICANT CHANGE UP (ref 135–145)
WBC # BLD: 6.11 K/UL — SIGNIFICANT CHANGE UP (ref 3.8–10.5)
WBC # FLD AUTO: 6.11 K/UL — SIGNIFICANT CHANGE UP (ref 3.8–10.5)

## 2025-07-21 PROCEDURE — 71045 X-RAY EXAM CHEST 1 VIEW: CPT

## 2025-07-21 PROCEDURE — 85027 COMPLETE CBC AUTOMATED: CPT

## 2025-07-21 PROCEDURE — 80053 COMPREHEN METABOLIC PANEL: CPT

## 2025-07-21 PROCEDURE — 87641 MR-STAPH DNA AMP PROBE: CPT

## 2025-07-21 PROCEDURE — 87040 BLOOD CULTURE FOR BACTERIA: CPT

## 2025-07-21 PROCEDURE — 74174 CTA ABD&PLVS W/CONTRAST: CPT | Mod: 26

## 2025-07-21 PROCEDURE — 82803 BLOOD GASES ANY COMBINATION: CPT

## 2025-07-21 PROCEDURE — 36415 COLL VENOUS BLD VENIPUNCTURE: CPT

## 2025-07-21 PROCEDURE — 71275 CT ANGIOGRAPHY CHEST: CPT | Mod: 26

## 2025-07-21 PROCEDURE — 85025 COMPLETE CBC W/AUTO DIFF WBC: CPT

## 2025-07-21 PROCEDURE — 0225U NFCT DS DNA&RNA 21 SARSCOV2: CPT

## 2025-07-21 PROCEDURE — 84100 ASSAY OF PHOSPHORUS: CPT

## 2025-07-21 PROCEDURE — 83605 ASSAY OF LACTIC ACID: CPT

## 2025-07-21 PROCEDURE — 83735 ASSAY OF MAGNESIUM: CPT

## 2025-07-21 PROCEDURE — 87640 STAPH A DNA AMP PROBE: CPT

## 2025-07-21 PROCEDURE — 99223 1ST HOSP IP/OBS HIGH 75: CPT

## 2025-07-21 PROCEDURE — 74022 RADEX COMPL AQT ABD SERIES: CPT

## 2025-07-21 PROCEDURE — 74174 CTA ABD&PLVS W/CONTRAST: CPT

## 2025-07-21 PROCEDURE — 93005 ELECTROCARDIOGRAM TRACING: CPT

## 2025-07-21 PROCEDURE — 71275 CT ANGIOGRAPHY CHEST: CPT

## 2025-07-21 PROCEDURE — 74176 CT ABD & PELVIS W/O CONTRAST: CPT

## 2025-07-21 PROCEDURE — 83690 ASSAY OF LIPASE: CPT

## 2025-07-21 RX ORDER — MELATONIN 5 MG
3 TABLET ORAL AT BEDTIME
Refills: 0 | Status: DISCONTINUED | OUTPATIENT
Start: 2025-07-21 | End: 2025-07-28

## 2025-07-21 RX ORDER — REMDESIVIR 5 MG/ML
200 INJECTION INTRAVENOUS ONCE
Refills: 0 | Status: COMPLETED | OUTPATIENT
Start: 2025-07-21 | End: 2025-07-21

## 2025-07-21 RX ORDER — METRONIDAZOLE 250 MG
TABLET ORAL
Refills: 0 | Status: DISCONTINUED | OUTPATIENT
Start: 2025-07-21 | End: 2025-07-21

## 2025-07-21 RX ORDER — ONDANSETRON HCL/PF 4 MG/2 ML
4 VIAL (ML) INJECTION EVERY 8 HOURS
Refills: 0 | Status: DISCONTINUED | OUTPATIENT
Start: 2025-07-21 | End: 2025-07-28

## 2025-07-21 RX ORDER — FOLIC ACID 1 MG/1
1 TABLET ORAL DAILY
Refills: 0 | Status: DISCONTINUED | OUTPATIENT
Start: 2025-07-21 | End: 2025-07-28

## 2025-07-21 RX ORDER — ACETAMINOPHEN 500 MG/5ML
650 LIQUID (ML) ORAL EVERY 6 HOURS
Refills: 0 | Status: DISCONTINUED | OUTPATIENT
Start: 2025-07-21 | End: 2025-07-28

## 2025-07-21 RX ORDER — METRONIDAZOLE 250 MG
500 TABLET ORAL ONCE
Refills: 0 | Status: COMPLETED | OUTPATIENT
Start: 2025-07-21 | End: 2025-07-21

## 2025-07-21 RX ORDER — ISOSORBIDE MONONITRATE 60 MG/1
30 TABLET, EXTENDED RELEASE ORAL DAILY
Refills: 0 | Status: DISCONTINUED | OUTPATIENT
Start: 2025-07-21 | End: 2025-07-28

## 2025-07-21 RX ORDER — CARVEDILOL 3.12 MG/1
6.25 TABLET, FILM COATED ORAL EVERY 12 HOURS
Refills: 0 | Status: DISCONTINUED | OUTPATIENT
Start: 2025-07-21 | End: 2025-07-28

## 2025-07-21 RX ORDER — REMDESIVIR 5 MG/ML
INJECTION INTRAVENOUS
Refills: 0 | Status: DISCONTINUED | OUTPATIENT
Start: 2025-07-21 | End: 2025-07-21

## 2025-07-21 RX ORDER — HYDROMORPHONE/SOD CHLOR,ISO/PF 2 MG/10 ML
0.2 SYRINGE (ML) INJECTION EVERY 4 HOURS
Refills: 0 | Status: DISCONTINUED | OUTPATIENT
Start: 2025-07-21 | End: 2025-07-23

## 2025-07-21 RX ORDER — REMDESIVIR 5 MG/ML
100 INJECTION INTRAVENOUS ONCE
Refills: 0 | Status: DISCONTINUED | OUTPATIENT
Start: 2025-07-22 | End: 2025-07-23

## 2025-07-21 RX ORDER — LEVETIRACETAM 10 MG/ML
500 INJECTION, SOLUTION INTRAVENOUS
Refills: 0 | Status: DISCONTINUED | OUTPATIENT
Start: 2025-07-21 | End: 2025-07-21

## 2025-07-21 RX ORDER — TAMSULOSIN HYDROCHLORIDE 0.4 MG/1
0.4 CAPSULE ORAL AT BEDTIME
Refills: 0 | Status: DISCONTINUED | OUTPATIENT
Start: 2025-07-21 | End: 2025-07-28

## 2025-07-21 RX ORDER — CEFEPIME 2 G/20ML
1000 INJECTION, POWDER, FOR SOLUTION INTRAVENOUS AT BEDTIME
Refills: 0 | Status: DISCONTINUED | OUTPATIENT
Start: 2025-07-21 | End: 2025-07-21

## 2025-07-21 RX ORDER — LABETALOL HYDROCHLORIDE 200 MG/1
10 TABLET, FILM COATED ORAL EVERY 4 HOURS
Refills: 0 | Status: DISCONTINUED | OUTPATIENT
Start: 2025-07-21 | End: 2025-07-28

## 2025-07-21 RX ORDER — SACUBITRIL AND VALSARTAN 6; 6 MG/1; MG/1
1 PELLET ORAL
Refills: 0 | Status: DISCONTINUED | OUTPATIENT
Start: 2025-07-21 | End: 2025-07-28

## 2025-07-21 RX ORDER — REMDESIVIR 5 MG/ML
100 INJECTION INTRAVENOUS ONCE
Refills: 0 | Status: DISCONTINUED | OUTPATIENT
Start: 2025-07-23 | End: 2025-07-23

## 2025-07-21 RX ORDER — HEPARIN SODIUM 1000 [USP'U]/ML
5000 INJECTION INTRAVENOUS; SUBCUTANEOUS EVERY 12 HOURS
Refills: 0 | Status: DISCONTINUED | OUTPATIENT
Start: 2025-07-21 | End: 2025-07-28

## 2025-07-21 RX ORDER — FERROUS SULFATE 137(45) MG
325 TABLET, EXTENDED RELEASE ORAL DAILY
Refills: 0 | Status: DISCONTINUED | OUTPATIENT
Start: 2025-07-21 | End: 2025-07-28

## 2025-07-21 RX ORDER — CEFTRIAXONE 500 MG/1
2000 INJECTION, POWDER, FOR SOLUTION INTRAMUSCULAR; INTRAVENOUS EVERY 24 HOURS
Refills: 0 | Status: COMPLETED | OUTPATIENT
Start: 2025-07-21 | End: 2025-07-25

## 2025-07-21 RX ORDER — LABETALOL HYDROCHLORIDE 200 MG/1
10 TABLET, FILM COATED ORAL ONCE
Refills: 0 | Status: COMPLETED | OUTPATIENT
Start: 2025-07-21 | End: 2025-07-21

## 2025-07-21 RX ORDER — METRONIDAZOLE 250 MG
500 TABLET ORAL EVERY 12 HOURS
Refills: 0 | Status: DISCONTINUED | OUTPATIENT
Start: 2025-07-21 | End: 2025-07-21

## 2025-07-21 RX ORDER — HYDROMORPHONE/SOD CHLOR,ISO/PF 2 MG/10 ML
0.5 SYRINGE (ML) INJECTION EVERY 4 HOURS
Refills: 0 | Status: DISCONTINUED | OUTPATIENT
Start: 2025-07-21 | End: 2025-07-23

## 2025-07-21 RX ORDER — CEFEPIME 2 G/20ML
1000 INJECTION, POWDER, FOR SOLUTION INTRAVENOUS ONCE
Refills: 0 | Status: COMPLETED | OUTPATIENT
Start: 2025-07-21 | End: 2025-07-21

## 2025-07-21 RX ORDER — ATORVASTATIN CALCIUM 80 MG/1
40 TABLET, FILM COATED ORAL AT BEDTIME
Refills: 0 | Status: DISCONTINUED | OUTPATIENT
Start: 2025-07-21 | End: 2025-07-28

## 2025-07-21 RX ORDER — MAGNESIUM, ALUMINUM HYDROXIDE 200-200 MG
30 TABLET,CHEWABLE ORAL EVERY 4 HOURS
Refills: 0 | Status: DISCONTINUED | OUTPATIENT
Start: 2025-07-21 | End: 2025-07-28

## 2025-07-21 RX ADMIN — Medication 0.5 MILLIGRAM(S): at 17:17

## 2025-07-21 RX ADMIN — SACUBITRIL AND VALSARTAN 1 TABLET(S): 6; 6 PELLET ORAL at 17:13

## 2025-07-21 RX ADMIN — Medication 0.5 MILLIGRAM(S): at 13:11

## 2025-07-21 RX ADMIN — Medication 4 MILLIGRAM(S): at 04:10

## 2025-07-21 RX ADMIN — CARVEDILOL 6.25 MILLIGRAM(S): 3.12 TABLET, FILM COATED ORAL at 17:14

## 2025-07-21 RX ADMIN — LABETALOL HYDROCHLORIDE 10 MILLIGRAM(S): 200 TABLET, FILM COATED ORAL at 13:31

## 2025-07-21 RX ADMIN — Medication 667 MILLIGRAM(S): at 17:13

## 2025-07-21 RX ADMIN — CARVEDILOL 6.25 MILLIGRAM(S): 3.12 TABLET, FILM COATED ORAL at 09:19

## 2025-07-21 RX ADMIN — Medication 0.5 MILLIGRAM(S): at 22:11

## 2025-07-21 RX ADMIN — Medication 10 MILLIGRAM(S): at 20:04

## 2025-07-21 RX ADMIN — CEFEPIME 1000 MILLIGRAM(S): 2 INJECTION, POWDER, FOR SOLUTION INTRAVENOUS at 09:13

## 2025-07-21 RX ADMIN — Medication 4 MILLIGRAM(S): at 21:14

## 2025-07-21 RX ADMIN — Medication 325 MILLIGRAM(S): at 11:08

## 2025-07-21 RX ADMIN — Medication 0.5 MILLIGRAM(S): at 14:05

## 2025-07-21 RX ADMIN — Medication 40 MILLIGRAM(S): at 17:13

## 2025-07-21 RX ADMIN — Medication 0.2 MILLIGRAM(S): at 20:40

## 2025-07-21 RX ADMIN — ISOSORBIDE MONONITRATE 30 MILLIGRAM(S): 60 TABLET, EXTENDED RELEASE ORAL at 09:20

## 2025-07-21 RX ADMIN — Medication 0.2 MILLIGRAM(S): at 20:25

## 2025-07-21 RX ADMIN — LEVETIRACETAM 500 MILLIGRAM(S): 10 INJECTION, SOLUTION INTRAVENOUS at 09:20

## 2025-07-21 RX ADMIN — Medication 40 MILLIGRAM(S): at 09:20

## 2025-07-21 RX ADMIN — SACUBITRIL AND VALSARTAN 1 TABLET(S): 6; 6 PELLET ORAL at 09:19

## 2025-07-21 RX ADMIN — Medication 0.5 MILLIGRAM(S): at 21:56

## 2025-07-21 RX ADMIN — CEFTRIAXONE 2000 MILLIGRAM(S): 500 INJECTION, POWDER, FOR SOLUTION INTRAMUSCULAR; INTRAVENOUS at 16:00

## 2025-07-21 RX ADMIN — Medication 650 MILLIGRAM(S): at 11:48

## 2025-07-21 RX ADMIN — FOLIC ACID 1 MILLIGRAM(S): 1 TABLET ORAL at 11:08

## 2025-07-21 RX ADMIN — Medication 10 MILLIGRAM(S): at 11:59

## 2025-07-21 RX ADMIN — Medication 100 MILLIGRAM(S): at 09:14

## 2025-07-21 RX ADMIN — Medication 667 MILLIGRAM(S): at 11:44

## 2025-07-21 RX ADMIN — Medication 4 MILLIGRAM(S): at 11:48

## 2025-07-21 RX ADMIN — REMDESIVIR 200 MILLIGRAM(S): 5 INJECTION INTRAVENOUS at 16:04

## 2025-07-21 RX ADMIN — HEPARIN SODIUM 5000 UNIT(S): 1000 INJECTION INTRAVENOUS; SUBCUTANEOUS at 17:13

## 2025-07-21 RX ADMIN — Medication 400 MILLIGRAM(S): at 02:10

## 2025-07-21 RX ADMIN — TAMSULOSIN HYDROCHLORIDE 0.4 MILLIGRAM(S): 0.4 CAPSULE ORAL at 21:14

## 2025-07-21 RX ADMIN — Medication 0.5 MILLIGRAM(S): at 18:10

## 2025-07-21 RX ADMIN — Medication 4 MILLIGRAM(S): at 04:07

## 2025-07-21 RX ADMIN — ATORVASTATIN CALCIUM 40 MILLIGRAM(S): 80 TABLET, FILM COATED ORAL at 21:14

## 2025-07-21 RX ADMIN — LABETALOL HYDROCHLORIDE 10 MILLIGRAM(S): 200 TABLET, FILM COATED ORAL at 11:44

## 2025-07-22 LAB
ALBUMIN SERPL ELPH-MCNC: 3.6 G/DL — SIGNIFICANT CHANGE UP (ref 3.3–5.2)
ALP SERPL-CCNC: 77 U/L — SIGNIFICANT CHANGE UP (ref 40–120)
ALT FLD-CCNC: 12 U/L — SIGNIFICANT CHANGE UP
ANION GAP SERPL CALC-SCNC: 20 MMOL/L — HIGH (ref 5–17)
ANION GAP SERPL CALC-SCNC: 21 MMOL/L — HIGH (ref 5–17)
AST SERPL-CCNC: 10 U/L — SIGNIFICANT CHANGE UP
BASOPHILS # BLD AUTO: 0.02 K/UL — SIGNIFICANT CHANGE UP (ref 0–0.2)
BASOPHILS NFR BLD AUTO: 0.5 % — SIGNIFICANT CHANGE UP (ref 0–2)
BILIRUB SERPL-MCNC: 0.8 MG/DL — SIGNIFICANT CHANGE UP (ref 0.4–2)
BUN SERPL-MCNC: 68.8 MG/DL — HIGH (ref 8–20)
BUN SERPL-MCNC: 71.3 MG/DL — HIGH (ref 8–20)
C DIFF BY PCR RESULT: SIGNIFICANT CHANGE UP
CALCIUM SERPL-MCNC: 8.8 MG/DL — SIGNIFICANT CHANGE UP (ref 8.4–10.5)
CALCIUM SERPL-MCNC: 8.9 MG/DL — SIGNIFICANT CHANGE UP (ref 8.4–10.5)
CHLORIDE SERPL-SCNC: 97 MMOL/L — SIGNIFICANT CHANGE UP (ref 96–108)
CHLORIDE SERPL-SCNC: 98 MMOL/L — SIGNIFICANT CHANGE UP (ref 96–108)
CO2 SERPL-SCNC: 21 MMOL/L — LOW (ref 22–29)
CO2 SERPL-SCNC: 21 MMOL/L — LOW (ref 22–29)
CREAT SERPL-MCNC: 7.67 MG/DL — HIGH (ref 0.5–1.3)
CREAT SERPL-MCNC: 7.8 MG/DL — HIGH (ref 0.5–1.3)
EGFR: 7 ML/MIN/1.73M2 — LOW
EOSINOPHIL # BLD AUTO: 0 K/UL — SIGNIFICANT CHANGE UP (ref 0–0.5)
EOSINOPHIL NFR BLD AUTO: 0 % — SIGNIFICANT CHANGE UP (ref 0–6)
FERRITIN SERPL-MCNC: 1226 NG/ML — HIGH (ref 30–400)
FOLATE SERPL-MCNC: 19.1 NG/ML — SIGNIFICANT CHANGE UP
GI PCR PANEL: SIGNIFICANT CHANGE UP
GLUCOSE SERPL-MCNC: 90 MG/DL — SIGNIFICANT CHANGE UP (ref 70–99)
GLUCOSE SERPL-MCNC: 92 MG/DL — SIGNIFICANT CHANGE UP (ref 70–99)
HCT VFR BLD CALC: 31.4 % — LOW (ref 39–50)
HCT VFR BLD CALC: 31.8 % — LOW (ref 39–50)
HGB BLD-MCNC: 9.7 G/DL — LOW (ref 13–17)
HGB BLD-MCNC: 9.7 G/DL — LOW (ref 13–17)
IMM GRANULOCYTES # BLD AUTO: 0.02 K/UL — SIGNIFICANT CHANGE UP (ref 0–0.07)
IMM GRANULOCYTES NFR BLD AUTO: 0.5 % — SIGNIFICANT CHANGE UP (ref 0–0.9)
IMMATURE PLATELET FRACTION #: 2.6 K/UL — LOW (ref 3.9–12.5)
IMMATURE PLATELET FRACTION #: 2.8 K/UL — LOW (ref 3.9–12.5)
IMMATURE PLATELET FRACTION %: 4.6 % — SIGNIFICANT CHANGE UP (ref 1.6–7.1)
IMMATURE PLATELET FRACTION %: 4.9 % — SIGNIFICANT CHANGE UP (ref 1.6–7.1)
INR BLD: 1.63 RATIO — HIGH (ref 0.85–1.16)
IRON SATN MFR SERPL: 16 UG/DL — LOW (ref 59–158)
IRON SATN MFR SERPL: 7 % — LOW (ref 16–55)
LYMPHOCYTES # BLD AUTO: 0.33 K/UL — LOW (ref 1–3.3)
LYMPHOCYTES NFR BLD AUTO: 9 % — LOW (ref 13–44)
MAGNESIUM SERPL-MCNC: 2.3 MG/DL — SIGNIFICANT CHANGE UP (ref 1.6–2.6)
MCHC RBC-ENTMCNC: 29.8 PG — SIGNIFICANT CHANGE UP (ref 27–34)
MCHC RBC-ENTMCNC: 29.9 PG — SIGNIFICANT CHANGE UP (ref 27–34)
MCHC RBC-ENTMCNC: 30.5 G/DL — LOW (ref 32–36)
MCHC RBC-ENTMCNC: 30.9 G/DL — LOW (ref 32–36)
MCV RBC AUTO: 96.9 FL — SIGNIFICANT CHANGE UP (ref 80–100)
MCV RBC AUTO: 97.8 FL — SIGNIFICANT CHANGE UP (ref 80–100)
MONOCYTES # BLD AUTO: 0.38 K/UL — SIGNIFICANT CHANGE UP (ref 0–0.9)
MONOCYTES NFR BLD AUTO: 10.4 % — SIGNIFICANT CHANGE UP (ref 2–14)
NEUTROPHILS # BLD AUTO: 2.92 K/UL — SIGNIFICANT CHANGE UP (ref 1.8–7.4)
NEUTROPHILS NFR BLD AUTO: 79.6 % — HIGH (ref 43–77)
NRBC # BLD AUTO: 0 K/UL — SIGNIFICANT CHANGE UP (ref 0–0)
NRBC # BLD AUTO: 0 K/UL — SIGNIFICANT CHANGE UP (ref 0–0)
NRBC # FLD: 0 K/UL — SIGNIFICANT CHANGE UP (ref 0–0)
NRBC # FLD: 0 K/UL — SIGNIFICANT CHANGE UP (ref 0–0)
NRBC BLD AUTO-RTO: 0 /100 WBCS — SIGNIFICANT CHANGE UP (ref 0–0)
NRBC BLD AUTO-RTO: 0 /100 WBCS — SIGNIFICANT CHANGE UP (ref 0–0)
PHOSPHATE SERPL-MCNC: 9.1 MG/DL — HIGH (ref 2.4–4.7)
PLATELET # BLD AUTO: 56 K/UL — LOW (ref 150–400)
PLATELET # BLD AUTO: 57 K/UL — LOW (ref 150–400)
PMV BLD: 10.9 FL — SIGNIFICANT CHANGE UP (ref 7–13)
PMV BLD: 12 FL — SIGNIFICANT CHANGE UP (ref 7–13)
POTASSIUM SERPL-MCNC: 4.6 MMOL/L — SIGNIFICANT CHANGE UP (ref 3.5–5.3)
POTASSIUM SERPL-MCNC: 4.7 MMOL/L — SIGNIFICANT CHANGE UP (ref 3.5–5.3)
POTASSIUM SERPL-SCNC: 4.6 MMOL/L — SIGNIFICANT CHANGE UP (ref 3.5–5.3)
POTASSIUM SERPL-SCNC: 4.7 MMOL/L — SIGNIFICANT CHANGE UP (ref 3.5–5.3)
PROT SERPL-MCNC: 6.9 G/DL — SIGNIFICANT CHANGE UP (ref 6.6–8.7)
PROTHROM AB SERPL-ACNC: 18.3 SEC — HIGH (ref 9.9–13.4)
RBC # BLD: 3.24 M/UL — LOW (ref 4.2–5.8)
RBC # BLD: 3.25 M/UL — LOW (ref 4.2–5.8)
RBC # FLD: 15.9 % — HIGH (ref 10.3–14.5)
RBC # FLD: 15.9 % — HIGH (ref 10.3–14.5)
SODIUM SERPL-SCNC: 139 MMOL/L — SIGNIFICANT CHANGE UP (ref 135–145)
SODIUM SERPL-SCNC: 139 MMOL/L — SIGNIFICANT CHANGE UP (ref 135–145)
TIBC SERPL-MCNC: 225 UG/DL — SIGNIFICANT CHANGE UP (ref 220–430)
TRANSFERRIN SERPL-MCNC: 157 MG/DL — LOW (ref 180–329)
VIT B12 SERPL-MCNC: 1438 PG/ML — HIGH (ref 232–1245)
WBC # BLD: 3.66 K/UL — LOW (ref 3.8–10.5)
WBC # BLD: 3.67 K/UL — LOW (ref 3.8–10.5)
WBC # FLD AUTO: 3.66 K/UL — LOW (ref 3.8–10.5)
WBC # FLD AUTO: 3.67 K/UL — LOW (ref 3.8–10.5)

## 2025-07-22 PROCEDURE — 85027 COMPLETE CBC AUTOMATED: CPT

## 2025-07-22 PROCEDURE — 83605 ASSAY OF LACTIC ACID: CPT

## 2025-07-22 PROCEDURE — 80048 BASIC METABOLIC PNL TOTAL CA: CPT

## 2025-07-22 PROCEDURE — 87640 STAPH A DNA AMP PROBE: CPT

## 2025-07-22 PROCEDURE — 83690 ASSAY OF LIPASE: CPT

## 2025-07-22 PROCEDURE — 82803 BLOOD GASES ANY COMBINATION: CPT

## 2025-07-22 PROCEDURE — 83540 ASSAY OF IRON: CPT

## 2025-07-22 PROCEDURE — 74176 CT ABD & PELVIS W/O CONTRAST: CPT

## 2025-07-22 PROCEDURE — 83735 ASSAY OF MAGNESIUM: CPT

## 2025-07-22 PROCEDURE — 87040 BLOOD CULTURE FOR BACTERIA: CPT

## 2025-07-22 PROCEDURE — 82728 ASSAY OF FERRITIN: CPT

## 2025-07-22 PROCEDURE — 87641 MR-STAPH DNA AMP PROBE: CPT

## 2025-07-22 PROCEDURE — 84466 ASSAY OF TRANSFERRIN: CPT

## 2025-07-22 PROCEDURE — 87507 IADNA-DNA/RNA PROBE TQ 12-25: CPT

## 2025-07-22 PROCEDURE — 74022 RADEX COMPL AQT ABD SERIES: CPT

## 2025-07-22 PROCEDURE — 80053 COMPREHEN METABOLIC PANEL: CPT

## 2025-07-22 PROCEDURE — 83550 IRON BINDING TEST: CPT

## 2025-07-22 PROCEDURE — 93005 ELECTROCARDIOGRAM TRACING: CPT

## 2025-07-22 PROCEDURE — 82607 VITAMIN B-12: CPT

## 2025-07-22 PROCEDURE — 87493 C DIFF AMPLIFIED PROBE: CPT

## 2025-07-22 PROCEDURE — 84100 ASSAY OF PHOSPHORUS: CPT

## 2025-07-22 PROCEDURE — 71275 CT ANGIOGRAPHY CHEST: CPT

## 2025-07-22 PROCEDURE — 99233 SBSQ HOSP IP/OBS HIGH 50: CPT

## 2025-07-22 PROCEDURE — 71045 X-RAY EXAM CHEST 1 VIEW: CPT

## 2025-07-22 PROCEDURE — 85610 PROTHROMBIN TIME: CPT

## 2025-07-22 PROCEDURE — 36415 COLL VENOUS BLD VENIPUNCTURE: CPT

## 2025-07-22 PROCEDURE — 99223 1ST HOSP IP/OBS HIGH 75: CPT

## 2025-07-22 PROCEDURE — 82746 ASSAY OF FOLIC ACID SERUM: CPT

## 2025-07-22 PROCEDURE — 85025 COMPLETE CBC W/AUTO DIFF WBC: CPT

## 2025-07-22 PROCEDURE — 0225U NFCT DS DNA&RNA 21 SARSCOV2: CPT

## 2025-07-22 PROCEDURE — 74174 CTA ABD&PLVS W/CONTRAST: CPT

## 2025-07-22 RX ADMIN — Medication 0.5 MILLIGRAM(S): at 02:03

## 2025-07-22 RX ADMIN — Medication 325 MILLIGRAM(S): at 12:21

## 2025-07-22 RX ADMIN — Medication 0.5 MILLIGRAM(S): at 10:41

## 2025-07-22 RX ADMIN — HEPARIN SODIUM 5000 UNIT(S): 1000 INJECTION INTRAVENOUS; SUBCUTANEOUS at 17:09

## 2025-07-22 RX ADMIN — Medication 0.5 MILLIGRAM(S): at 08:08

## 2025-07-22 RX ADMIN — Medication 0.5 MILLIGRAM(S): at 17:10

## 2025-07-22 RX ADMIN — CEFTRIAXONE 2000 MILLIGRAM(S): 500 INJECTION, POWDER, FOR SOLUTION INTRAMUSCULAR; INTRAVENOUS at 17:09

## 2025-07-22 RX ADMIN — Medication 0.5 MILLIGRAM(S): at 12:52

## 2025-07-22 RX ADMIN — Medication 1 APPLICATION(S): at 12:24

## 2025-07-22 RX ADMIN — Medication 4 MILLIGRAM(S): at 05:19

## 2025-07-22 RX ADMIN — Medication 0.2 MILLIGRAM(S): at 05:19

## 2025-07-22 RX ADMIN — Medication 0.2 MILLIGRAM(S): at 01:19

## 2025-07-22 RX ADMIN — Medication 10 MILLIGRAM(S): at 04:06

## 2025-07-22 RX ADMIN — Medication 667 MILLIGRAM(S): at 17:09

## 2025-07-22 RX ADMIN — Medication 667 MILLIGRAM(S): at 12:21

## 2025-07-22 RX ADMIN — Medication 0.2 MILLIGRAM(S): at 11:04

## 2025-07-22 RX ADMIN — FOLIC ACID 1 MILLIGRAM(S): 1 TABLET ORAL at 12:21

## 2025-07-22 RX ADMIN — TAMSULOSIN HYDROCHLORIDE 0.4 MILLIGRAM(S): 0.4 CAPSULE ORAL at 21:41

## 2025-07-22 RX ADMIN — CARVEDILOL 6.25 MILLIGRAM(S): 3.12 TABLET, FILM COATED ORAL at 17:10

## 2025-07-22 RX ADMIN — Medication 0.2 MILLIGRAM(S): at 10:51

## 2025-07-22 RX ADMIN — Medication 0.5 MILLIGRAM(S): at 02:18

## 2025-07-22 RX ADMIN — Medication 0.5 MILLIGRAM(S): at 13:08

## 2025-07-22 RX ADMIN — CARVEDILOL 6.25 MILLIGRAM(S): 3.12 TABLET, FILM COATED ORAL at 05:19

## 2025-07-22 RX ADMIN — Medication 0.5 MILLIGRAM(S): at 22:40

## 2025-07-22 RX ADMIN — Medication 0.5 MILLIGRAM(S): at 21:40

## 2025-07-22 RX ADMIN — ISOSORBIDE MONONITRATE 30 MILLIGRAM(S): 60 TABLET, EXTENDED RELEASE ORAL at 14:39

## 2025-07-22 RX ADMIN — Medication 40 MILLIGRAM(S): at 17:10

## 2025-07-22 RX ADMIN — ATORVASTATIN CALCIUM 40 MILLIGRAM(S): 80 TABLET, FILM COATED ORAL at 21:41

## 2025-07-22 RX ADMIN — Medication 0.2 MILLIGRAM(S): at 05:34

## 2025-07-22 RX ADMIN — Medication 40 MILLIGRAM(S): at 05:19

## 2025-07-22 RX ADMIN — Medication 667 MILLIGRAM(S): at 08:08

## 2025-07-22 RX ADMIN — Medication 0.2 MILLIGRAM(S): at 01:34

## 2025-07-22 RX ADMIN — SACUBITRIL AND VALSARTAN 1 TABLET(S): 6; 6 PELLET ORAL at 17:09

## 2025-07-22 RX ADMIN — HEPARIN SODIUM 5000 UNIT(S): 1000 INJECTION INTRAVENOUS; SUBCUTANEOUS at 05:19

## 2025-07-22 RX ADMIN — SACUBITRIL AND VALSARTAN 1 TABLET(S): 6; 6 PELLET ORAL at 05:19

## 2025-07-23 LAB
ACANTHOCYTES BLD QL SMEAR: SLIGHT — SIGNIFICANT CHANGE UP
ANION GAP SERPL CALC-SCNC: 14 MMOL/L — SIGNIFICANT CHANGE UP (ref 5–17)
ANISOCYTOSIS BLD QL: SLIGHT — SIGNIFICANT CHANGE UP
BASOPHILS # BLD AUTO: 0.02 K/UL — SIGNIFICANT CHANGE UP (ref 0–0.2)
BASOPHILS # BLD MANUAL: 0 K/UL — SIGNIFICANT CHANGE UP (ref 0–0.2)
BASOPHILS NFR BLD AUTO: 0.6 % — SIGNIFICANT CHANGE UP (ref 0–2)
BASOPHILS NFR BLD MANUAL: 0 % — SIGNIFICANT CHANGE UP (ref 0–2)
BUN SERPL-MCNC: 49.4 MG/DL — HIGH (ref 8–20)
BURR CELLS BLD QL SMEAR: ABNORMAL
CALCIUM SERPL-MCNC: 8.4 MG/DL — SIGNIFICANT CHANGE UP (ref 8.4–10.5)
CHLORIDE SERPL-SCNC: 98 MMOL/L — SIGNIFICANT CHANGE UP (ref 96–108)
CO2 SERPL-SCNC: 25 MMOL/L — SIGNIFICANT CHANGE UP (ref 22–29)
CREAT SERPL-MCNC: 6.12 MG/DL — HIGH (ref 0.5–1.3)
EGFR: 10 ML/MIN/1.73M2 — LOW
EGFR: 10 ML/MIN/1.73M2 — LOW
ELLIPTOCYTES BLD QL SMEAR: ABNORMAL
EOSINOPHIL # BLD AUTO: 0.04 K/UL — SIGNIFICANT CHANGE UP (ref 0–0.5)
EOSINOPHIL # BLD MANUAL: 0 K/UL — SIGNIFICANT CHANGE UP (ref 0–0.5)
EOSINOPHIL NFR BLD AUTO: 1.2 % — SIGNIFICANT CHANGE UP (ref 0–6)
EOSINOPHIL NFR BLD MANUAL: 0 % — SIGNIFICANT CHANGE UP (ref 0–6)
GIANT PLATELETS BLD QL SMEAR: PRESENT
GLUCOSE SERPL-MCNC: 87 MG/DL — SIGNIFICANT CHANGE UP (ref 70–99)
HCT VFR BLD CALC: 31.3 % — LOW (ref 39–50)
HGB BLD-MCNC: 9.5 G/DL — LOW (ref 13–17)
IMM GRANULOCYTES # BLD AUTO: 0.02 K/UL — SIGNIFICANT CHANGE UP (ref 0–0.07)
IMM GRANULOCYTES NFR BLD AUTO: 0.6 % — SIGNIFICANT CHANGE UP (ref 0–0.9)
IMMATURE PLATELET FRACTION #: 2.9 K/UL — LOW (ref 3.9–12.5)
IMMATURE PLATELET FRACTION %: 5.2 % — SIGNIFICANT CHANGE UP (ref 1.6–7.1)
INR BLD: 1.48 RATIO — HIGH (ref 0.85–1.16)
LYMPHOCYTES # BLD AUTO: 0.56 K/UL — LOW (ref 1–3.3)
LYMPHOCYTES # BLD MANUAL: 0.39 K/UL — LOW (ref 1–3.3)
LYMPHOCYTES NFR BLD AUTO: 17.2 % — SIGNIFICANT CHANGE UP (ref 13–44)
LYMPHOCYTES NFR BLD MANUAL: 11.9 % — LOW (ref 13–44)
MANUAL NEUTROPHIL BANDS #: 0.14 K/UL — SIGNIFICANT CHANGE UP (ref 0–0.84)
MCHC RBC-ENTMCNC: 29.8 PG — SIGNIFICANT CHANGE UP (ref 27–34)
MCHC RBC-ENTMCNC: 30.4 G/DL — LOW (ref 32–36)
MCV RBC AUTO: 98.1 FL — SIGNIFICANT CHANGE UP (ref 80–100)
MONOCYTES # BLD AUTO: 0.36 K/UL — SIGNIFICANT CHANGE UP (ref 0–0.9)
MONOCYTES # BLD MANUAL: 0.11 K/UL — SIGNIFICANT CHANGE UP (ref 0–0.9)
MONOCYTES NFR BLD AUTO: 11.1 % — SIGNIFICANT CHANGE UP (ref 2–14)
MONOCYTES NFR BLD MANUAL: 3.4 % — SIGNIFICANT CHANGE UP (ref 2–14)
NEUTROPHILS # BLD AUTO: 2.25 K/UL — SIGNIFICANT CHANGE UP (ref 1.8–7.4)
NEUTROPHILS # BLD MANUAL: 2.62 K/UL — SIGNIFICANT CHANGE UP (ref 1.8–7.4)
NEUTROPHILS NFR BLD AUTO: 69.3 % — SIGNIFICANT CHANGE UP (ref 43–77)
NEUTROPHILS NFR BLD MANUAL: 80.5 % — HIGH (ref 43–77)
NEUTS BAND # BLD: 4.2 % — SIGNIFICANT CHANGE UP (ref 0–8)
NEUTS BAND NFR BLD: 4.2 % — SIGNIFICANT CHANGE UP (ref 0–8)
NRBC # BLD AUTO: 0 K/UL — SIGNIFICANT CHANGE UP (ref 0–0)
NRBC # FLD: 0 K/UL — SIGNIFICANT CHANGE UP (ref 0–0)
OVALOCYTES BLD QL SMEAR: ABNORMAL
PHOSPHATE SERPL-MCNC: 5.8 MG/DL — HIGH (ref 2.4–4.7)
PLAT MORPH BLD: NORMAL — SIGNIFICANT CHANGE UP
PLATELET # BLD AUTO: 55 K/UL — LOW (ref 150–400)
PMV BLD: SIGNIFICANT CHANGE UP FL (ref 7–13)
POIKILOCYTOSIS BLD QL AUTO: ABNORMAL
POLYCHROMASIA BLD QL SMEAR: ABNORMAL
POTASSIUM SERPL-MCNC: 4.4 MMOL/L — SIGNIFICANT CHANGE UP (ref 3.5–5.3)
POTASSIUM SERPL-SCNC: 4.4 MMOL/L — SIGNIFICANT CHANGE UP (ref 3.5–5.3)
PROTHROM AB SERPL-ACNC: 17.1 SEC — HIGH (ref 9.9–13.4)
RBC # BLD: 3.19 M/UL — LOW (ref 4.2–5.8)
RBC # FLD: 15.8 % — HIGH (ref 10.3–14.5)
RBC BLD AUTO: ABNORMAL
SMUDGE CELLS # BLD: PRESENT
SODIUM SERPL-SCNC: 137 MMOL/L — SIGNIFICANT CHANGE UP (ref 135–145)
WBC # BLD: 3.25 K/UL — LOW (ref 3.8–10.5)
WBC # FLD AUTO: 3.25 K/UL — LOW (ref 3.8–10.5)

## 2025-07-23 PROCEDURE — 82607 VITAMIN B-12: CPT

## 2025-07-23 PROCEDURE — 93005 ELECTROCARDIOGRAM TRACING: CPT

## 2025-07-23 PROCEDURE — 36415 COLL VENOUS BLD VENIPUNCTURE: CPT

## 2025-07-23 PROCEDURE — 87040 BLOOD CULTURE FOR BACTERIA: CPT

## 2025-07-23 PROCEDURE — 83605 ASSAY OF LACTIC ACID: CPT

## 2025-07-23 PROCEDURE — 83735 ASSAY OF MAGNESIUM: CPT

## 2025-07-23 PROCEDURE — 82746 ASSAY OF FOLIC ACID SERUM: CPT

## 2025-07-23 PROCEDURE — 74174 CTA ABD&PLVS W/CONTRAST: CPT

## 2025-07-23 PROCEDURE — 85610 PROTHROMBIN TIME: CPT

## 2025-07-23 PROCEDURE — 99233 SBSQ HOSP IP/OBS HIGH 50: CPT | Mod: GC

## 2025-07-23 PROCEDURE — 87640 STAPH A DNA AMP PROBE: CPT

## 2025-07-23 PROCEDURE — 82803 BLOOD GASES ANY COMBINATION: CPT

## 2025-07-23 PROCEDURE — 87493 C DIFF AMPLIFIED PROBE: CPT

## 2025-07-23 PROCEDURE — 84466 ASSAY OF TRANSFERRIN: CPT

## 2025-07-23 PROCEDURE — 87641 MR-STAPH DNA AMP PROBE: CPT

## 2025-07-23 PROCEDURE — 87507 IADNA-DNA/RNA PROBE TQ 12-25: CPT

## 2025-07-23 PROCEDURE — 0225U NFCT DS DNA&RNA 21 SARSCOV2: CPT

## 2025-07-23 PROCEDURE — 83690 ASSAY OF LIPASE: CPT

## 2025-07-23 PROCEDURE — 80048 BASIC METABOLIC PNL TOTAL CA: CPT

## 2025-07-23 PROCEDURE — 85027 COMPLETE CBC AUTOMATED: CPT

## 2025-07-23 PROCEDURE — 85025 COMPLETE CBC W/AUTO DIFF WBC: CPT

## 2025-07-23 PROCEDURE — 74176 CT ABD & PELVIS W/O CONTRAST: CPT

## 2025-07-23 PROCEDURE — 71045 X-RAY EXAM CHEST 1 VIEW: CPT

## 2025-07-23 PROCEDURE — 82728 ASSAY OF FERRITIN: CPT

## 2025-07-23 PROCEDURE — 84100 ASSAY OF PHOSPHORUS: CPT

## 2025-07-23 PROCEDURE — 83540 ASSAY OF IRON: CPT

## 2025-07-23 PROCEDURE — 71275 CT ANGIOGRAPHY CHEST: CPT

## 2025-07-23 PROCEDURE — 80053 COMPREHEN METABOLIC PANEL: CPT

## 2025-07-23 PROCEDURE — 74022 RADEX COMPL AQT ABD SERIES: CPT

## 2025-07-23 PROCEDURE — 83550 IRON BINDING TEST: CPT

## 2025-07-23 RX ORDER — REMDESIVIR 5 MG/ML
200 INJECTION INTRAVENOUS EVERY 24 HOURS
Refills: 0 | Status: COMPLETED | OUTPATIENT
Start: 2025-07-23 | End: 2025-07-23

## 2025-07-23 RX ORDER — REMDESIVIR 5 MG/ML
INJECTION INTRAVENOUS
Refills: 0 | Status: DISCONTINUED | OUTPATIENT
Start: 2025-07-23 | End: 2025-07-24

## 2025-07-23 RX ORDER — REMDESIVIR 5 MG/ML
INJECTION INTRAVENOUS
Refills: 0 | Status: DISCONTINUED | OUTPATIENT
Start: 2025-07-23 | End: 2025-07-23

## 2025-07-23 RX ORDER — REMDESIVIR 5 MG/ML
100 INJECTION INTRAVENOUS EVERY 24 HOURS
Refills: 0 | Status: DISCONTINUED | OUTPATIENT
Start: 2025-07-24 | End: 2025-07-24

## 2025-07-23 RX ADMIN — ISOSORBIDE MONONITRATE 30 MILLIGRAM(S): 60 TABLET, EXTENDED RELEASE ORAL at 13:04

## 2025-07-23 RX ADMIN — Medication 40 MILLIGRAM(S): at 06:15

## 2025-07-23 RX ADMIN — Medication 0.5 MILLIGRAM(S): at 06:42

## 2025-07-23 RX ADMIN — Medication 1 APPLICATION(S): at 13:04

## 2025-07-23 RX ADMIN — CEFTRIAXONE 2000 MILLIGRAM(S): 500 INJECTION, POWDER, FOR SOLUTION INTRAMUSCULAR; INTRAVENOUS at 17:08

## 2025-07-23 RX ADMIN — CARVEDILOL 6.25 MILLIGRAM(S): 3.12 TABLET, FILM COATED ORAL at 17:07

## 2025-07-23 RX ADMIN — REMDESIVIR 200 MILLIGRAM(S): 5 INJECTION INTRAVENOUS at 21:05

## 2025-07-23 RX ADMIN — SACUBITRIL AND VALSARTAN 1 TABLET(S): 6; 6 PELLET ORAL at 17:08

## 2025-07-23 RX ADMIN — Medication 1 MILLIGRAM(S): at 17:27

## 2025-07-23 RX ADMIN — Medication 1 MILLIGRAM(S): at 17:07

## 2025-07-23 RX ADMIN — SACUBITRIL AND VALSARTAN 1 TABLET(S): 6; 6 PELLET ORAL at 06:14

## 2025-07-23 RX ADMIN — Medication 0.5 MILLIGRAM(S): at 06:14

## 2025-07-23 RX ADMIN — ATORVASTATIN CALCIUM 40 MILLIGRAM(S): 80 TABLET, FILM COATED ORAL at 21:05

## 2025-07-23 RX ADMIN — Medication 1 MILLIGRAM(S): at 21:15

## 2025-07-23 RX ADMIN — Medication 667 MILLIGRAM(S): at 17:07

## 2025-07-23 RX ADMIN — FOLIC ACID 1 MILLIGRAM(S): 1 TABLET ORAL at 13:04

## 2025-07-23 RX ADMIN — HEPARIN SODIUM 5000 UNIT(S): 1000 INJECTION INTRAVENOUS; SUBCUTANEOUS at 17:07

## 2025-07-23 RX ADMIN — Medication 325 MILLIGRAM(S): at 13:04

## 2025-07-23 RX ADMIN — TAMSULOSIN HYDROCHLORIDE 0.4 MILLIGRAM(S): 0.4 CAPSULE ORAL at 21:05

## 2025-07-23 RX ADMIN — Medication 1 MILLIGRAM(S): at 21:30

## 2025-07-23 RX ADMIN — Medication 0.5 MILLIGRAM(S): at 03:04

## 2025-07-23 RX ADMIN — Medication 40 MILLIGRAM(S): at 17:07

## 2025-07-23 RX ADMIN — CARVEDILOL 6.25 MILLIGRAM(S): 3.12 TABLET, FILM COATED ORAL at 06:14

## 2025-07-23 RX ADMIN — HEPARIN SODIUM 5000 UNIT(S): 1000 INJECTION INTRAVENOUS; SUBCUTANEOUS at 06:14

## 2025-07-23 RX ADMIN — Medication 0.5 MILLIGRAM(S): at 02:04

## 2025-07-23 RX ADMIN — Medication 667 MILLIGRAM(S): at 13:04

## 2025-07-24 ENCOUNTER — TRANSCRIPTION ENCOUNTER (OUTPATIENT)
Age: 65
End: 2025-07-24

## 2025-07-24 LAB
ACANTHOCYTES BLD QL SMEAR: SLIGHT — SIGNIFICANT CHANGE UP
ALBUMIN SERPL ELPH-MCNC: 3.3 G/DL — SIGNIFICANT CHANGE UP (ref 3.3–5.2)
ALBUMIN SERPL ELPH-MCNC: 3.3 G/DL — SIGNIFICANT CHANGE UP (ref 3.3–5.2)
ALP SERPL-CCNC: 86 U/L — SIGNIFICANT CHANGE UP (ref 40–120)
ALP SERPL-CCNC: 86 U/L — SIGNIFICANT CHANGE UP (ref 40–120)
ALT FLD-CCNC: 7 U/L — SIGNIFICANT CHANGE UP
ALT FLD-CCNC: 8 U/L — SIGNIFICANT CHANGE UP
AMPHET UR-MCNC: NEGATIVE — SIGNIFICANT CHANGE UP
ANION GAP SERPL CALC-SCNC: 17 MMOL/L — SIGNIFICANT CHANGE UP (ref 5–17)
AST SERPL-CCNC: 10 U/L — SIGNIFICANT CHANGE UP
AST SERPL-CCNC: 11 U/L — SIGNIFICANT CHANGE UP
BARBITURATES UR SCN-MCNC: NEGATIVE — SIGNIFICANT CHANGE UP
BASOPHILS # BLD AUTO: 0.01 K/UL — SIGNIFICANT CHANGE UP (ref 0–0.2)
BASOPHILS # BLD MANUAL: 0.02 K/UL — SIGNIFICANT CHANGE UP (ref 0–0.2)
BASOPHILS NFR BLD AUTO: 0.4 % — SIGNIFICANT CHANGE UP (ref 0–2)
BASOPHILS NFR BLD MANUAL: 0.9 % — SIGNIFICANT CHANGE UP (ref 0–2)
BENZODIAZ UR-MCNC: NEGATIVE — SIGNIFICANT CHANGE UP
BILIRUB DIRECT SERPL-MCNC: 0.3 MG/DL — SIGNIFICANT CHANGE UP (ref 0–0.3)
BILIRUB INDIRECT FLD-MCNC: 0.2 MG/DL — SIGNIFICANT CHANGE UP (ref 0.2–1)
BILIRUB SERPL-MCNC: 0.5 MG/DL — SIGNIFICANT CHANGE UP (ref 0.4–2)
BILIRUB SERPL-MCNC: 0.5 MG/DL — SIGNIFICANT CHANGE UP (ref 0.4–2)
BUN SERPL-MCNC: 65.2 MG/DL — HIGH (ref 8–20)
CALCIUM SERPL-MCNC: 8 MG/DL — LOW (ref 8.4–10.5)
CHLORIDE SERPL-SCNC: 99 MMOL/L — SIGNIFICANT CHANGE UP (ref 96–108)
CO2 SERPL-SCNC: 22 MMOL/L — SIGNIFICANT CHANGE UP (ref 22–29)
COCAINE METAB.OTHER UR-MCNC: NEGATIVE — SIGNIFICANT CHANGE UP
CREAT SERPL-MCNC: 7.76 MG/DL — HIGH (ref 0.5–1.3)
EGFR: 7 ML/MIN/1.73M2 — LOW
EGFR: 7 ML/MIN/1.73M2 — LOW
ELLIPTOCYTES BLD QL SMEAR: SLIGHT — SIGNIFICANT CHANGE UP
EOSINOPHIL # BLD AUTO: 0.04 K/UL — SIGNIFICANT CHANGE UP (ref 0–0.5)
EOSINOPHIL # BLD MANUAL: 0.02 K/UL — SIGNIFICANT CHANGE UP (ref 0–0.5)
EOSINOPHIL NFR BLD AUTO: 1.6 % — SIGNIFICANT CHANGE UP (ref 0–6)
EOSINOPHIL NFR BLD MANUAL: 0.9 % — SIGNIFICANT CHANGE UP (ref 0–6)
FENTANYL UR QL SCN: NEGATIVE — SIGNIFICANT CHANGE UP
GIANT PLATELETS BLD QL SMEAR: PRESENT
GLUCOSE SERPL-MCNC: 102 MG/DL — HIGH (ref 70–99)
HCT VFR BLD CALC: 30.4 % — LOW (ref 39–50)
HGB BLD-MCNC: 9.4 G/DL — LOW (ref 13–17)
HYPOCHROMIA BLD QL: SLIGHT — SIGNIFICANT CHANGE UP
IMM GRANULOCYTES # BLD AUTO: 0.01 K/UL — SIGNIFICANT CHANGE UP (ref 0–0.07)
IMM GRANULOCYTES NFR BLD AUTO: 0.4 % — SIGNIFICANT CHANGE UP (ref 0–0.9)
IMMATURE PLATELET FRACTION #: 3.1 K/UL — LOW (ref 3.9–12.5)
IMMATURE PLATELET FRACTION %: 6 % — SIGNIFICANT CHANGE UP (ref 1.6–7.1)
INR BLD: 1.32 RATIO — HIGH (ref 0.85–1.16)
LYMPHOCYTES # BLD AUTO: 0.8 K/UL — LOW (ref 1–3.3)
LYMPHOCYTES # BLD MANUAL: 0.77 K/UL — LOW (ref 1–3.3)
LYMPHOCYTES NFR BLD AUTO: 31.1 % — SIGNIFICANT CHANGE UP (ref 13–44)
LYMPHOCYTES NFR BLD MANUAL: 29.9 % — SIGNIFICANT CHANGE UP (ref 13–44)
MACROCYTES BLD QL: SLIGHT — SIGNIFICANT CHANGE UP
MAGNESIUM SERPL-MCNC: 2.1 MG/DL — SIGNIFICANT CHANGE UP (ref 1.6–2.6)
MANUAL REACTIVE LYMPHOCYTES #: 0.04 K/UL — SIGNIFICANT CHANGE UP (ref 0–0.63)
MCHC RBC-ENTMCNC: 29.9 PG — SIGNIFICANT CHANGE UP (ref 27–34)
MCHC RBC-ENTMCNC: 30.9 G/DL — LOW (ref 32–36)
MCV RBC AUTO: 96.8 FL — SIGNIFICANT CHANGE UP (ref 80–100)
METHADONE UR-MCNC: NEGATIVE — SIGNIFICANT CHANGE UP
MONOCYTES # BLD AUTO: 0.32 K/UL — SIGNIFICANT CHANGE UP (ref 0–0.9)
MONOCYTES # BLD MANUAL: 0.09 K/UL — SIGNIFICANT CHANGE UP (ref 0–0.9)
MONOCYTES NFR BLD AUTO: 12.5 % — SIGNIFICANT CHANGE UP (ref 2–14)
MONOCYTES NFR BLD MANUAL: 3.4 % — SIGNIFICANT CHANGE UP (ref 2–14)
NEUTROPHILS # BLD AUTO: 1.39 K/UL — LOW (ref 1.8–7.4)
NEUTROPHILS # BLD MANUAL: 1.62 K/UL — LOW (ref 1.8–7.4)
NEUTROPHILS NFR BLD AUTO: 54 % — SIGNIFICANT CHANGE UP (ref 43–77)
NEUTROPHILS NFR BLD MANUAL: 63.2 % — SIGNIFICANT CHANGE UP (ref 43–77)
NRBC # BLD AUTO: 0 K/UL — SIGNIFICANT CHANGE UP (ref 0–0)
NRBC # FLD: 0 K/UL — SIGNIFICANT CHANGE UP (ref 0–0)
NRBC BLD AUTO-RTO: 0 /100 WBCS — SIGNIFICANT CHANGE UP (ref 0–0)
OPIATES UR-MCNC: POSITIVE
OVALOCYTES BLD QL SMEAR: SLIGHT — SIGNIFICANT CHANGE UP
PCP SPEC-MCNC: SIGNIFICANT CHANGE UP
PCP UR-MCNC: NEGATIVE — SIGNIFICANT CHANGE UP
PHOSPHATE SERPL-MCNC: 5.4 MG/DL — HIGH (ref 2.4–4.7)
PLAT MORPH BLD: NORMAL — SIGNIFICANT CHANGE UP
PLATELET # BLD AUTO: 52 K/UL — LOW (ref 150–400)
PLATELET COUNT - ESTIMATE: ABNORMAL
PMV BLD: 12.5 FL — SIGNIFICANT CHANGE UP (ref 7–13)
POTASSIUM SERPL-MCNC: 4.3 MMOL/L — SIGNIFICANT CHANGE UP (ref 3.5–5.3)
POTASSIUM SERPL-SCNC: 4.3 MMOL/L — SIGNIFICANT CHANGE UP (ref 3.5–5.3)
PROT SERPL-MCNC: 6.2 G/DL — LOW (ref 6.6–8.7)
PROT SERPL-MCNC: 6.2 G/DL — LOW (ref 6.6–8.7)
PROTHROM AB SERPL-ACNC: 14.9 SEC — HIGH (ref 9.9–13.4)
RBC # BLD: 3.14 M/UL — LOW (ref 4.2–5.8)
RBC # FLD: 15.8 % — HIGH (ref 10.3–14.5)
RBC BLD AUTO: ABNORMAL
SMUDGE CELLS # BLD: PRESENT
SODIUM SERPL-SCNC: 138 MMOL/L — SIGNIFICANT CHANGE UP (ref 135–145)
THC UR QL: POSITIVE
VARIANT LYMPHS # BLD: 1.7 % — SIGNIFICANT CHANGE UP (ref 0–6)
VARIANT LYMPHS NFR BLD MANUAL: 1.7 % — SIGNIFICANT CHANGE UP (ref 0–6)
WBC # BLD: 2.57 K/UL — LOW (ref 3.8–10.5)
WBC # FLD AUTO: 2.57 K/UL — LOW (ref 3.8–10.5)

## 2025-07-24 PROCEDURE — 99233 SBSQ HOSP IP/OBS HIGH 50: CPT | Mod: GC

## 2025-07-24 RX ORDER — REMDESIVIR 5 MG/ML
100 INJECTION INTRAVENOUS ONCE
Refills: 0 | Status: COMPLETED | OUTPATIENT
Start: 2025-07-24 | End: 2025-07-24

## 2025-07-24 RX ORDER — REMDESIVIR 5 MG/ML
100 INJECTION INTRAVENOUS EVERY 24 HOURS
Refills: 0 | Status: DISCONTINUED | OUTPATIENT
Start: 2025-07-24 | End: 2025-07-24

## 2025-07-24 RX ADMIN — Medication 40 MILLIGRAM(S): at 05:05

## 2025-07-24 RX ADMIN — CARVEDILOL 6.25 MILLIGRAM(S): 3.12 TABLET, FILM COATED ORAL at 17:33

## 2025-07-24 RX ADMIN — HEPARIN SODIUM 5000 UNIT(S): 1000 INJECTION INTRAVENOUS; SUBCUTANEOUS at 05:05

## 2025-07-24 RX ADMIN — Medication 1 MILLIGRAM(S): at 01:55

## 2025-07-24 RX ADMIN — CARVEDILOL 6.25 MILLIGRAM(S): 3.12 TABLET, FILM COATED ORAL at 05:05

## 2025-07-24 RX ADMIN — CEFTRIAXONE 2000 MILLIGRAM(S): 500 INJECTION, POWDER, FOR SOLUTION INTRAMUSCULAR; INTRAVENOUS at 17:33

## 2025-07-24 RX ADMIN — Medication 1 MILLIGRAM(S): at 21:03

## 2025-07-24 RX ADMIN — TAMSULOSIN HYDROCHLORIDE 0.4 MILLIGRAM(S): 0.4 CAPSULE ORAL at 21:03

## 2025-07-24 RX ADMIN — SACUBITRIL AND VALSARTAN 1 TABLET(S): 6; 6 PELLET ORAL at 17:33

## 2025-07-24 RX ADMIN — ATORVASTATIN CALCIUM 40 MILLIGRAM(S): 80 TABLET, FILM COATED ORAL at 21:03

## 2025-07-24 RX ADMIN — REMDESIVIR 200 MILLIGRAM(S): 5 INJECTION INTRAVENOUS at 17:34

## 2025-07-24 RX ADMIN — SACUBITRIL AND VALSARTAN 1 TABLET(S): 6; 6 PELLET ORAL at 05:05

## 2025-07-24 RX ADMIN — Medication 667 MILLIGRAM(S): at 17:32

## 2025-07-24 RX ADMIN — Medication 1 MILLIGRAM(S): at 15:34

## 2025-07-24 RX ADMIN — HEPARIN SODIUM 5000 UNIT(S): 1000 INJECTION INTRAVENOUS; SUBCUTANEOUS at 17:33

## 2025-07-24 RX ADMIN — Medication 40 MILLIGRAM(S): at 17:32

## 2025-07-24 RX ADMIN — Medication 1 MILLIGRAM(S): at 08:00

## 2025-07-24 RX ADMIN — Medication 1 MILLIGRAM(S): at 06:57

## 2025-07-24 RX ADMIN — Medication 1 MILLIGRAM(S): at 02:10

## 2025-07-25 LAB
ALBUMIN SERPL ELPH-MCNC: 3.2 G/DL — LOW (ref 3.3–5.2)
ALP SERPL-CCNC: 101 U/L — SIGNIFICANT CHANGE UP (ref 40–120)
ALT FLD-CCNC: 7 U/L — SIGNIFICANT CHANGE UP
ANION GAP SERPL CALC-SCNC: 16 MMOL/L — SIGNIFICANT CHANGE UP (ref 5–17)
AST SERPL-CCNC: 14 U/L — SIGNIFICANT CHANGE UP
BILIRUB DIRECT SERPL-MCNC: 0.3 MG/DL — SIGNIFICANT CHANGE UP (ref 0–0.3)
BILIRUB INDIRECT FLD-MCNC: 0.2 MG/DL — SIGNIFICANT CHANGE UP (ref 0.2–1)
BILIRUB SERPL-MCNC: 0.5 MG/DL — SIGNIFICANT CHANGE UP (ref 0.4–2)
BUN SERPL-MCNC: 45.3 MG/DL — HIGH (ref 8–20)
CALCIUM SERPL-MCNC: 7.9 MG/DL — LOW (ref 8.4–10.5)
CHLORIDE SERPL-SCNC: 97 MMOL/L — SIGNIFICANT CHANGE UP (ref 96–108)
CO2 SERPL-SCNC: 22 MMOL/L — SIGNIFICANT CHANGE UP (ref 22–29)
CREAT SERPL-MCNC: 5.89 MG/DL — HIGH (ref 0.5–1.3)
EGFR: 10 ML/MIN/1.73M2 — LOW
EGFR: 10 ML/MIN/1.73M2 — LOW
GLUCOSE SERPL-MCNC: 102 MG/DL — HIGH (ref 70–99)
HBV SURFACE AG SER-ACNC: SIGNIFICANT CHANGE UP
HCT VFR BLD CALC: 31.3 % — LOW (ref 39–50)
HCV AB S/CO SERPL IA: 0.13 S/CO — SIGNIFICANT CHANGE UP (ref 0–0.79)
HCV AB SERPL-IMP: SIGNIFICANT CHANGE UP
HGB BLD-MCNC: 9.9 G/DL — LOW (ref 13–17)
IMMATURE PLATELET FRACTION #: 3.5 K/UL — LOW (ref 3.9–12.5)
IMMATURE PLATELET FRACTION %: 6.8 % — SIGNIFICANT CHANGE UP (ref 1.6–7.1)
INR BLD: 1.34 RATIO — HIGH (ref 0.85–1.16)
MAGNESIUM SERPL-MCNC: 1.9 MG/DL — SIGNIFICANT CHANGE UP (ref 1.6–2.6)
MCHC RBC-ENTMCNC: 29.9 PG — SIGNIFICANT CHANGE UP (ref 27–34)
MCHC RBC-ENTMCNC: 31.6 G/DL — LOW (ref 32–36)
MCV RBC AUTO: 94.6 FL — SIGNIFICANT CHANGE UP (ref 80–100)
NRBC # BLD AUTO: 0 K/UL — SIGNIFICANT CHANGE UP (ref 0–0)
NRBC # FLD: 0 K/UL — SIGNIFICANT CHANGE UP (ref 0–0)
NRBC BLD AUTO-RTO: 0 /100 WBCS — SIGNIFICANT CHANGE UP (ref 0–0)
PLATELET # BLD AUTO: 52 K/UL — LOW (ref 150–400)
PMV BLD: 12.1 FL — SIGNIFICANT CHANGE UP (ref 7–13)
POTASSIUM SERPL-MCNC: 4 MMOL/L — SIGNIFICANT CHANGE UP (ref 3.5–5.3)
POTASSIUM SERPL-SCNC: 4 MMOL/L — SIGNIFICANT CHANGE UP (ref 3.5–5.3)
PROT SERPL-MCNC: 6.3 G/DL — LOW (ref 6.6–8.7)
PROTHROM AB SERPL-ACNC: 15.5 SEC — HIGH (ref 9.9–13.4)
RBC # BLD: 3.31 M/UL — LOW (ref 4.2–5.8)
RBC # FLD: 15.5 % — HIGH (ref 10.3–14.5)
SODIUM SERPL-SCNC: 135 MMOL/L — SIGNIFICANT CHANGE UP (ref 135–145)
WBC # BLD: 3.25 K/UL — LOW (ref 3.8–10.5)
WBC # FLD AUTO: 3.25 K/UL — LOW (ref 3.8–10.5)

## 2025-07-25 PROCEDURE — 99232 SBSQ HOSP IP/OBS MODERATE 35: CPT | Mod: GC

## 2025-07-25 RX ORDER — DEXTROMETHORPHAN HBR, GUAIFENESIN 200 MG/10ML
200 LIQUID ORAL EVERY 6 HOURS
Refills: 0 | Status: DISCONTINUED | OUTPATIENT
Start: 2025-07-25 | End: 2025-07-28

## 2025-07-25 RX ADMIN — DEXTROMETHORPHAN HBR, GUAIFENESIN 200 MILLIGRAM(S): 200 LIQUID ORAL at 00:15

## 2025-07-25 RX ADMIN — SACUBITRIL AND VALSARTAN 1 TABLET(S): 6; 6 PELLET ORAL at 05:33

## 2025-07-25 RX ADMIN — Medication 15 MILLIGRAM(S): at 11:38

## 2025-07-25 RX ADMIN — CEFTRIAXONE 2000 MILLIGRAM(S): 500 INJECTION, POWDER, FOR SOLUTION INTRAMUSCULAR; INTRAVENOUS at 17:01

## 2025-07-25 RX ADMIN — Medication 1 MILLIGRAM(S): at 17:01

## 2025-07-25 RX ADMIN — HEPARIN SODIUM 5000 UNIT(S): 1000 INJECTION INTRAVENOUS; SUBCUTANEOUS at 17:01

## 2025-07-25 RX ADMIN — Medication 40 MILLIGRAM(S): at 17:02

## 2025-07-25 RX ADMIN — Medication 1 APPLICATION(S): at 11:08

## 2025-07-25 RX ADMIN — Medication 1 MILLIGRAM(S): at 23:26

## 2025-07-25 RX ADMIN — Medication 1 MILLIGRAM(S): at 03:50

## 2025-07-25 RX ADMIN — Medication 40 MILLIGRAM(S): at 05:33

## 2025-07-25 RX ADMIN — Medication 10 MILLIGRAM(S): at 08:27

## 2025-07-25 RX ADMIN — DEXTROMETHORPHAN HBR, GUAIFENESIN 200 MILLIGRAM(S): 200 LIQUID ORAL at 08:32

## 2025-07-25 RX ADMIN — Medication 15 MILLIGRAM(S): at 22:19

## 2025-07-25 RX ADMIN — Medication 667 MILLIGRAM(S): at 17:02

## 2025-07-25 RX ADMIN — CARVEDILOL 6.25 MILLIGRAM(S): 3.12 TABLET, FILM COATED ORAL at 05:33

## 2025-07-25 RX ADMIN — Medication 4 MILLIGRAM(S): at 16:26

## 2025-07-25 RX ADMIN — ATORVASTATIN CALCIUM 40 MILLIGRAM(S): 80 TABLET, FILM COATED ORAL at 22:20

## 2025-07-25 RX ADMIN — ISOSORBIDE MONONITRATE 30 MILLIGRAM(S): 60 TABLET, EXTENDED RELEASE ORAL at 11:08

## 2025-07-25 RX ADMIN — Medication 1 MILLIGRAM(S): at 03:53

## 2025-07-25 RX ADMIN — Medication 15 MILLIGRAM(S): at 23:26

## 2025-07-25 RX ADMIN — CARVEDILOL 6.25 MILLIGRAM(S): 3.12 TABLET, FILM COATED ORAL at 17:02

## 2025-07-25 RX ADMIN — Medication 1 MILLIGRAM(S): at 05:03

## 2025-07-25 RX ADMIN — Medication 15 MILLIGRAM(S): at 12:38

## 2025-07-25 RX ADMIN — Medication 667 MILLIGRAM(S): at 08:27

## 2025-07-25 RX ADMIN — SACUBITRIL AND VALSARTAN 1 TABLET(S): 6; 6 PELLET ORAL at 17:02

## 2025-07-25 RX ADMIN — Medication 1 MILLIGRAM(S): at 23:46

## 2025-07-25 RX ADMIN — TAMSULOSIN HYDROCHLORIDE 0.4 MILLIGRAM(S): 0.4 CAPSULE ORAL at 22:20

## 2025-07-26 ENCOUNTER — TRANSCRIPTION ENCOUNTER (OUTPATIENT)
Age: 65
End: 2025-07-26

## 2025-07-26 LAB
CULTURE RESULTS: SIGNIFICANT CHANGE UP
SPECIMEN SOURCE: SIGNIFICANT CHANGE UP

## 2025-07-26 PROCEDURE — 74176 CT ABD & PELVIS W/O CONTRAST: CPT

## 2025-07-26 PROCEDURE — 87040 BLOOD CULTURE FOR BACTERIA: CPT

## 2025-07-26 PROCEDURE — 74022 RADEX COMPL AQT ABD SERIES: CPT

## 2025-07-26 PROCEDURE — 80307 DRUG TEST PRSMV CHEM ANLYZR: CPT

## 2025-07-26 PROCEDURE — 93005 ELECTROCARDIOGRAM TRACING: CPT

## 2025-07-26 PROCEDURE — 74174 CTA ABD&PLVS W/CONTRAST: CPT

## 2025-07-26 PROCEDURE — 85025 COMPLETE CBC W/AUTO DIFF WBC: CPT

## 2025-07-26 PROCEDURE — 83605 ASSAY OF LACTIC ACID: CPT

## 2025-07-26 PROCEDURE — 71275 CT ANGIOGRAPHY CHEST: CPT

## 2025-07-26 PROCEDURE — 99233 SBSQ HOSP IP/OBS HIGH 50: CPT | Mod: GC

## 2025-07-26 PROCEDURE — 87640 STAPH A DNA AMP PROBE: CPT

## 2025-07-26 PROCEDURE — 83550 IRON BINDING TEST: CPT

## 2025-07-26 PROCEDURE — 36415 COLL VENOUS BLD VENIPUNCTURE: CPT

## 2025-07-26 PROCEDURE — 85027 COMPLETE CBC AUTOMATED: CPT

## 2025-07-26 PROCEDURE — 82728 ASSAY OF FERRITIN: CPT

## 2025-07-26 PROCEDURE — 84466 ASSAY OF TRANSFERRIN: CPT

## 2025-07-26 PROCEDURE — 83690 ASSAY OF LIPASE: CPT

## 2025-07-26 PROCEDURE — 87340 HEPATITIS B SURFACE AG IA: CPT

## 2025-07-26 PROCEDURE — 86803 HEPATITIS C AB TEST: CPT

## 2025-07-26 PROCEDURE — 82803 BLOOD GASES ANY COMBINATION: CPT

## 2025-07-26 PROCEDURE — 87507 IADNA-DNA/RNA PROBE TQ 12-25: CPT

## 2025-07-26 PROCEDURE — 83540 ASSAY OF IRON: CPT

## 2025-07-26 PROCEDURE — 80053 COMPREHEN METABOLIC PANEL: CPT

## 2025-07-26 PROCEDURE — 71045 X-RAY EXAM CHEST 1 VIEW: CPT

## 2025-07-26 PROCEDURE — 87641 MR-STAPH DNA AMP PROBE: CPT

## 2025-07-26 PROCEDURE — 80076 HEPATIC FUNCTION PANEL: CPT

## 2025-07-26 PROCEDURE — 84100 ASSAY OF PHOSPHORUS: CPT

## 2025-07-26 PROCEDURE — 85610 PROTHROMBIN TIME: CPT

## 2025-07-26 PROCEDURE — 83735 ASSAY OF MAGNESIUM: CPT

## 2025-07-26 PROCEDURE — 82607 VITAMIN B-12: CPT

## 2025-07-26 PROCEDURE — 87493 C DIFF AMPLIFIED PROBE: CPT

## 2025-07-26 PROCEDURE — 80048 BASIC METABOLIC PNL TOTAL CA: CPT

## 2025-07-26 PROCEDURE — 0225U NFCT DS DNA&RNA 21 SARSCOV2: CPT

## 2025-07-26 PROCEDURE — 82746 ASSAY OF FOLIC ACID SERUM: CPT

## 2025-07-26 RX ORDER — SACUBITRIL AND VALSARTAN 6; 6 MG/1; MG/1
1 PELLET ORAL
Qty: 0 | Refills: 0 | DISCHARGE
Start: 2025-07-26

## 2025-07-26 RX ORDER — HYDROMORPHONE/SOD CHLOR,ISO/PF 2 MG/10 ML
0.5 SYRINGE (ML) INJECTION ONCE
Refills: 0 | Status: DISCONTINUED | OUTPATIENT
Start: 2025-07-26 | End: 2025-07-26

## 2025-07-26 RX ADMIN — Medication 1 MILLIGRAM(S): at 12:47

## 2025-07-26 RX ADMIN — Medication 667 MILLIGRAM(S): at 08:58

## 2025-07-26 RX ADMIN — Medication 4 MILLIGRAM(S): at 00:49

## 2025-07-26 RX ADMIN — TAMSULOSIN HYDROCHLORIDE 0.4 MILLIGRAM(S): 0.4 CAPSULE ORAL at 21:11

## 2025-07-26 RX ADMIN — Medication 1 MILLIGRAM(S): at 22:59

## 2025-07-26 RX ADMIN — ISOSORBIDE MONONITRATE 30 MILLIGRAM(S): 60 TABLET, EXTENDED RELEASE ORAL at 12:47

## 2025-07-26 RX ADMIN — ATORVASTATIN CALCIUM 40 MILLIGRAM(S): 80 TABLET, FILM COATED ORAL at 21:11

## 2025-07-26 RX ADMIN — HEPARIN SODIUM 5000 UNIT(S): 1000 INJECTION INTRAVENOUS; SUBCUTANEOUS at 05:24

## 2025-07-26 RX ADMIN — Medication 4 MILLIGRAM(S): at 09:03

## 2025-07-26 RX ADMIN — Medication 1 MILLIGRAM(S): at 04:41

## 2025-07-26 RX ADMIN — Medication 1 MILLIGRAM(S): at 23:10

## 2025-07-26 RX ADMIN — Medication 15 MILLIGRAM(S): at 22:40

## 2025-07-26 RX ADMIN — FOLIC ACID 1 MILLIGRAM(S): 1 TABLET ORAL at 12:47

## 2025-07-26 RX ADMIN — CARVEDILOL 6.25 MILLIGRAM(S): 3.12 TABLET, FILM COATED ORAL at 18:37

## 2025-07-26 RX ADMIN — HEPARIN SODIUM 5000 UNIT(S): 1000 INJECTION INTRAVENOUS; SUBCUTANEOUS at 18:38

## 2025-07-26 RX ADMIN — CARVEDILOL 6.25 MILLIGRAM(S): 3.12 TABLET, FILM COATED ORAL at 05:25

## 2025-07-26 RX ADMIN — Medication 667 MILLIGRAM(S): at 18:37

## 2025-07-26 RX ADMIN — Medication 40 MILLIGRAM(S): at 18:38

## 2025-07-26 RX ADMIN — Medication 1 MILLIGRAM(S): at 18:38

## 2025-07-26 RX ADMIN — Medication 325 MILLIGRAM(S): at 12:47

## 2025-07-26 RX ADMIN — Medication 40 MILLIGRAM(S): at 05:25

## 2025-07-26 RX ADMIN — SACUBITRIL AND VALSARTAN 1 TABLET(S): 6; 6 PELLET ORAL at 05:25

## 2025-07-26 RX ADMIN — Medication 1 MILLIGRAM(S): at 03:41

## 2025-07-26 RX ADMIN — Medication 1 MILLIGRAM(S): at 14:45

## 2025-07-26 RX ADMIN — Medication 0.5 MILLIGRAM(S): at 06:34

## 2025-07-26 RX ADMIN — Medication 0.5 MILLIGRAM(S): at 07:00

## 2025-07-26 RX ADMIN — Medication 15 MILLIGRAM(S): at 08:57

## 2025-07-26 RX ADMIN — Medication 15 MILLIGRAM(S): at 21:11

## 2025-07-26 RX ADMIN — SACUBITRIL AND VALSARTAN 1 TABLET(S): 6; 6 PELLET ORAL at 18:37

## 2025-07-26 RX ADMIN — Medication 667 MILLIGRAM(S): at 12:48

## 2025-07-27 LAB
HCT VFR BLD CALC: 31.9 % — LOW (ref 39–50)
HGB BLD-MCNC: 9.9 G/DL — LOW (ref 13–17)
IMMATURE PLATELET FRACTION #: 3.1 K/UL — LOW (ref 3.9–12.5)
IMMATURE PLATELET FRACTION %: 4.5 % — SIGNIFICANT CHANGE UP (ref 1.6–7.1)
MCHC RBC-ENTMCNC: 30.7 PG — SIGNIFICANT CHANGE UP (ref 27–34)
MCHC RBC-ENTMCNC: 31 G/DL — LOW (ref 32–36)
MCV RBC AUTO: 98.8 FL — SIGNIFICANT CHANGE UP (ref 80–100)
NRBC # BLD AUTO: 0 K/UL — SIGNIFICANT CHANGE UP (ref 0–0)
NRBC # FLD: 0 K/UL — SIGNIFICANT CHANGE UP (ref 0–0)
NRBC BLD AUTO-RTO: 0 /100 WBCS — SIGNIFICANT CHANGE UP (ref 0–0)
PLATELET # BLD AUTO: 69 K/UL — LOW (ref 150–400)
PMV BLD: 12.3 FL — SIGNIFICANT CHANGE UP (ref 7–13)
RBC # BLD: 3.23 M/UL — LOW (ref 4.2–5.8)
RBC # FLD: 15.5 % — HIGH (ref 10.3–14.5)
WBC # BLD: 4.55 K/UL — SIGNIFICANT CHANGE UP (ref 3.8–10.5)
WBC # FLD AUTO: 4.55 K/UL — SIGNIFICANT CHANGE UP (ref 3.8–10.5)

## 2025-07-27 PROCEDURE — 85025 COMPLETE CBC W/AUTO DIFF WBC: CPT

## 2025-07-27 PROCEDURE — 82728 ASSAY OF FERRITIN: CPT

## 2025-07-27 PROCEDURE — 36415 COLL VENOUS BLD VENIPUNCTURE: CPT

## 2025-07-27 PROCEDURE — 80307 DRUG TEST PRSMV CHEM ANLYZR: CPT

## 2025-07-27 PROCEDURE — 71275 CT ANGIOGRAPHY CHEST: CPT

## 2025-07-27 PROCEDURE — 74022 RADEX COMPL AQT ABD SERIES: CPT

## 2025-07-27 PROCEDURE — 82803 BLOOD GASES ANY COMBINATION: CPT

## 2025-07-27 PROCEDURE — 87640 STAPH A DNA AMP PROBE: CPT

## 2025-07-27 PROCEDURE — 87040 BLOOD CULTURE FOR BACTERIA: CPT

## 2025-07-27 PROCEDURE — 80053 COMPREHEN METABOLIC PANEL: CPT

## 2025-07-27 PROCEDURE — 83540 ASSAY OF IRON: CPT

## 2025-07-27 PROCEDURE — 82607 VITAMIN B-12: CPT

## 2025-07-27 PROCEDURE — 87507 IADNA-DNA/RNA PROBE TQ 12-25: CPT

## 2025-07-27 PROCEDURE — 99232 SBSQ HOSP IP/OBS MODERATE 35: CPT | Mod: GC

## 2025-07-27 PROCEDURE — 86803 HEPATITIS C AB TEST: CPT

## 2025-07-27 PROCEDURE — 83550 IRON BINDING TEST: CPT

## 2025-07-27 PROCEDURE — 83735 ASSAY OF MAGNESIUM: CPT

## 2025-07-27 PROCEDURE — 87340 HEPATITIS B SURFACE AG IA: CPT

## 2025-07-27 PROCEDURE — 87641 MR-STAPH DNA AMP PROBE: CPT

## 2025-07-27 PROCEDURE — 84100 ASSAY OF PHOSPHORUS: CPT

## 2025-07-27 PROCEDURE — 0225U NFCT DS DNA&RNA 21 SARSCOV2: CPT

## 2025-07-27 PROCEDURE — 80048 BASIC METABOLIC PNL TOTAL CA: CPT

## 2025-07-27 PROCEDURE — 85610 PROTHROMBIN TIME: CPT

## 2025-07-27 PROCEDURE — 82746 ASSAY OF FOLIC ACID SERUM: CPT

## 2025-07-27 PROCEDURE — 80076 HEPATIC FUNCTION PANEL: CPT

## 2025-07-27 PROCEDURE — 74176 CT ABD & PELVIS W/O CONTRAST: CPT

## 2025-07-27 PROCEDURE — 93005 ELECTROCARDIOGRAM TRACING: CPT

## 2025-07-27 PROCEDURE — 71045 X-RAY EXAM CHEST 1 VIEW: CPT

## 2025-07-27 PROCEDURE — 83690 ASSAY OF LIPASE: CPT

## 2025-07-27 PROCEDURE — 83605 ASSAY OF LACTIC ACID: CPT

## 2025-07-27 PROCEDURE — 87493 C DIFF AMPLIFIED PROBE: CPT

## 2025-07-27 PROCEDURE — 74174 CTA ABD&PLVS W/CONTRAST: CPT

## 2025-07-27 PROCEDURE — 84466 ASSAY OF TRANSFERRIN: CPT

## 2025-07-27 PROCEDURE — 85027 COMPLETE CBC AUTOMATED: CPT

## 2025-07-27 RX ADMIN — Medication 1 MILLIGRAM(S): at 21:19

## 2025-07-27 RX ADMIN — Medication 1 MILLIGRAM(S): at 03:22

## 2025-07-27 RX ADMIN — Medication 1 MILLIGRAM(S): at 08:08

## 2025-07-27 RX ADMIN — Medication 4 MILLIGRAM(S): at 10:03

## 2025-07-27 RX ADMIN — SACUBITRIL AND VALSARTAN 1 TABLET(S): 6; 6 PELLET ORAL at 05:18

## 2025-07-27 RX ADMIN — Medication 40 MILLIGRAM(S): at 05:18

## 2025-07-27 RX ADMIN — ISOSORBIDE MONONITRATE 30 MILLIGRAM(S): 60 TABLET, EXTENDED RELEASE ORAL at 12:40

## 2025-07-27 RX ADMIN — Medication 1 MILLIGRAM(S): at 21:34

## 2025-07-27 RX ADMIN — Medication 667 MILLIGRAM(S): at 12:41

## 2025-07-27 RX ADMIN — Medication 1 MILLIGRAM(S): at 12:40

## 2025-07-27 RX ADMIN — CARVEDILOL 6.25 MILLIGRAM(S): 3.12 TABLET, FILM COATED ORAL at 17:05

## 2025-07-27 RX ADMIN — FOLIC ACID 1 MILLIGRAM(S): 1 TABLET ORAL at 12:41

## 2025-07-27 RX ADMIN — Medication 1 MILLIGRAM(S): at 17:03

## 2025-07-27 RX ADMIN — Medication 1 MILLIGRAM(S): at 09:09

## 2025-07-27 RX ADMIN — Medication 1 APPLICATION(S): at 17:13

## 2025-07-27 RX ADMIN — Medication 1 MILLIGRAM(S): at 03:40

## 2025-07-27 RX ADMIN — Medication 4 MILLIGRAM(S): at 17:04

## 2025-07-27 RX ADMIN — SACUBITRIL AND VALSARTAN 1 TABLET(S): 6; 6 PELLET ORAL at 17:05

## 2025-07-27 RX ADMIN — Medication 40 MILLIGRAM(S): at 17:05

## 2025-07-27 RX ADMIN — Medication 1 MILLIGRAM(S): at 17:04

## 2025-07-27 RX ADMIN — Medication 325 MILLIGRAM(S): at 12:41

## 2025-07-27 RX ADMIN — Medication 1 MILLIGRAM(S): at 13:40

## 2025-07-27 RX ADMIN — Medication 667 MILLIGRAM(S): at 08:08

## 2025-07-27 RX ADMIN — CARVEDILOL 6.25 MILLIGRAM(S): 3.12 TABLET, FILM COATED ORAL at 05:18

## 2025-07-28 VITALS — DIASTOLIC BLOOD PRESSURE: 71 MMHG | SYSTOLIC BLOOD PRESSURE: 132 MMHG

## 2025-07-28 PROCEDURE — 82746 ASSAY OF FOLIC ACID SERUM: CPT

## 2025-07-28 PROCEDURE — 99285 EMERGENCY DEPT VISIT HI MDM: CPT

## 2025-07-28 PROCEDURE — 80053 COMPREHEN METABOLIC PANEL: CPT

## 2025-07-28 PROCEDURE — 74174 CTA ABD&PLVS W/CONTRAST: CPT

## 2025-07-28 PROCEDURE — 82803 BLOOD GASES ANY COMBINATION: CPT

## 2025-07-28 PROCEDURE — 71045 X-RAY EXAM CHEST 1 VIEW: CPT

## 2025-07-28 PROCEDURE — 87493 C DIFF AMPLIFIED PROBE: CPT

## 2025-07-28 PROCEDURE — 83690 ASSAY OF LIPASE: CPT

## 2025-07-28 PROCEDURE — 85610 PROTHROMBIN TIME: CPT

## 2025-07-28 PROCEDURE — 83735 ASSAY OF MAGNESIUM: CPT

## 2025-07-28 PROCEDURE — 83605 ASSAY OF LACTIC ACID: CPT

## 2025-07-28 PROCEDURE — 87641 MR-STAPH DNA AMP PROBE: CPT

## 2025-07-28 PROCEDURE — 36415 COLL VENOUS BLD VENIPUNCTURE: CPT

## 2025-07-28 PROCEDURE — 93005 ELECTROCARDIOGRAM TRACING: CPT

## 2025-07-28 PROCEDURE — 83550 IRON BINDING TEST: CPT

## 2025-07-28 PROCEDURE — 0225U NFCT DS DNA&RNA 21 SARSCOV2: CPT

## 2025-07-28 PROCEDURE — 86803 HEPATITIS C AB TEST: CPT

## 2025-07-28 PROCEDURE — 82607 VITAMIN B-12: CPT

## 2025-07-28 PROCEDURE — 85025 COMPLETE CBC W/AUTO DIFF WBC: CPT

## 2025-07-28 PROCEDURE — 74176 CT ABD & PELVIS W/O CONTRAST: CPT

## 2025-07-28 PROCEDURE — 96375 TX/PRO/DX INJ NEW DRUG ADDON: CPT

## 2025-07-28 PROCEDURE — 71275 CT ANGIOGRAPHY CHEST: CPT

## 2025-07-28 PROCEDURE — 87340 HEPATITIS B SURFACE AG IA: CPT

## 2025-07-28 PROCEDURE — 87640 STAPH A DNA AMP PROBE: CPT

## 2025-07-28 PROCEDURE — 82728 ASSAY OF FERRITIN: CPT

## 2025-07-28 PROCEDURE — 83540 ASSAY OF IRON: CPT

## 2025-07-28 PROCEDURE — 87507 IADNA-DNA/RNA PROBE TQ 12-25: CPT

## 2025-07-28 PROCEDURE — 85027 COMPLETE CBC AUTOMATED: CPT

## 2025-07-28 PROCEDURE — 80307 DRUG TEST PRSMV CHEM ANLYZR: CPT

## 2025-07-28 PROCEDURE — 96376 TX/PRO/DX INJ SAME DRUG ADON: CPT

## 2025-07-28 PROCEDURE — 96374 THER/PROPH/DIAG INJ IV PUSH: CPT

## 2025-07-28 PROCEDURE — 74022 RADEX COMPL AQT ABD SERIES: CPT

## 2025-07-28 PROCEDURE — 84466 ASSAY OF TRANSFERRIN: CPT

## 2025-07-28 PROCEDURE — 99239 HOSP IP/OBS DSCHRG MGMT >30: CPT

## 2025-07-28 PROCEDURE — 80048 BASIC METABOLIC PNL TOTAL CA: CPT

## 2025-07-28 PROCEDURE — 84100 ASSAY OF PHOSPHORUS: CPT

## 2025-07-28 PROCEDURE — 80076 HEPATIC FUNCTION PANEL: CPT

## 2025-07-28 PROCEDURE — 87040 BLOOD CULTURE FOR BACTERIA: CPT

## 2025-07-28 RX ADMIN — Medication 4 MILLIGRAM(S): at 01:45

## 2025-07-28 RX ADMIN — Medication 1 MILLIGRAM(S): at 01:27

## 2025-08-12 ENCOUNTER — APPOINTMENT (OUTPATIENT)
Dept: HEART FAILURE | Facility: CLINIC | Age: 65
End: 2025-08-12

## 2025-08-12 ENCOUNTER — EMERGENCY (EMERGENCY)
Facility: HOSPITAL | Age: 65
LOS: 1 days | End: 2025-08-12
Attending: STUDENT IN AN ORGANIZED HEALTH CARE EDUCATION/TRAINING PROGRAM
Payer: MEDICARE

## 2025-08-12 VITALS
HEART RATE: 64 BPM | OXYGEN SATURATION: 96 % | SYSTOLIC BLOOD PRESSURE: 184 MMHG | DIASTOLIC BLOOD PRESSURE: 69 MMHG | RESPIRATION RATE: 18 BRPM

## 2025-08-12 VITALS
HEART RATE: 71 BPM | DIASTOLIC BLOOD PRESSURE: 86 MMHG | HEIGHT: 68 IN | SYSTOLIC BLOOD PRESSURE: 192 MMHG | OXYGEN SATURATION: 98 % | RESPIRATION RATE: 18 BRPM | TEMPERATURE: 98 F

## 2025-08-12 DIAGNOSIS — Z95.1 PRESENCE OF AORTOCORONARY BYPASS GRAFT: Chronic | ICD-10-CM

## 2025-08-12 DIAGNOSIS — Z86.79 PERSONAL HISTORY OF OTHER DISEASES OF THE CIRCULATORY SYSTEM: Chronic | ICD-10-CM

## 2025-08-12 DIAGNOSIS — Z95.2 PRESENCE OF PROSTHETIC HEART VALVE: Chronic | ICD-10-CM

## 2025-08-12 DIAGNOSIS — Z94.0 KIDNEY TRANSPLANT STATUS: Chronic | ICD-10-CM

## 2025-08-12 DIAGNOSIS — Z90.49 ACQUIRED ABSENCE OF OTHER SPECIFIED PARTS OF DIGESTIVE TRACT: Chronic | ICD-10-CM

## 2025-08-12 DIAGNOSIS — I77.0 ARTERIOVENOUS FISTULA, ACQUIRED: Chronic | ICD-10-CM

## 2025-08-12 DIAGNOSIS — Z95.818 PRESENCE OF OTHER CARDIAC IMPLANTS AND GRAFTS: Chronic | ICD-10-CM

## 2025-08-12 LAB
ALBUMIN SERPL ELPH-MCNC: 4.1 G/DL — SIGNIFICANT CHANGE UP (ref 3.3–5.2)
ALP SERPL-CCNC: 92 U/L — SIGNIFICANT CHANGE UP (ref 40–120)
ALT FLD-CCNC: 16 U/L — SIGNIFICANT CHANGE UP
ANION GAP SERPL CALC-SCNC: 18 MMOL/L — HIGH (ref 5–17)
ANION GAP SERPL CALC-SCNC: 21 MMOL/L — HIGH (ref 5–17)
ANTIBODY INTERPRETATION 2: SIGNIFICANT CHANGE UP
APTT BLD: 38.1 SEC — HIGH (ref 26.1–36.8)
AST SERPL-CCNC: 21 U/L — SIGNIFICANT CHANGE UP
BASOPHILS # BLD AUTO: 0.05 K/UL — SIGNIFICANT CHANGE UP (ref 0–0.2)
BASOPHILS NFR BLD AUTO: 0.9 % — SIGNIFICANT CHANGE UP (ref 0–2)
BILIRUB SERPL-MCNC: 1.2 MG/DL — SIGNIFICANT CHANGE UP (ref 0.4–2)
BLD GP AB SCN SERPL QL: SIGNIFICANT CHANGE UP
BLD GP AB SCN SERPL QL: SIGNIFICANT CHANGE UP
BUN SERPL-MCNC: 110.3 MG/DL — HIGH (ref 8–20)
BUN SERPL-MCNC: 119 MG/DL — HIGH (ref 8–20)
CALCIUM SERPL-MCNC: 9.1 MG/DL — SIGNIFICANT CHANGE UP (ref 8.4–10.5)
CALCIUM SERPL-MCNC: 9.5 MG/DL — SIGNIFICANT CHANGE UP (ref 8.4–10.5)
CHLORIDE SERPL-SCNC: 101 MMOL/L — SIGNIFICANT CHANGE UP (ref 96–108)
CHLORIDE SERPL-SCNC: 103 MMOL/L — SIGNIFICANT CHANGE UP (ref 96–108)
CO2 SERPL-SCNC: 14 MMOL/L — LOW (ref 22–29)
CO2 SERPL-SCNC: 16 MMOL/L — LOW (ref 22–29)
CREAT SERPL-MCNC: 7.3 MG/DL — HIGH (ref 0.5–1.3)
CREAT SERPL-MCNC: 7.33 MG/DL — HIGH (ref 0.5–1.3)
DAT C3-SP REAG RBC QL: ABNORMAL
DAT IGG-SP REAG RBC-IMP: SIGNIFICANT CHANGE UP
DIR ANTIGLOB POLYSPECIFIC INTERPRETATION: ABNORMAL
EGFR: 8 ML/MIN/1.73M2 — LOW
EOSINOPHIL # BLD AUTO: 0.26 K/UL — SIGNIFICANT CHANGE UP (ref 0–0.5)
EOSINOPHIL NFR BLD AUTO: 4.7 % — SIGNIFICANT CHANGE UP (ref 0–6)
GLUCOSE SERPL-MCNC: 59 MG/DL — LOW (ref 70–99)
GLUCOSE SERPL-MCNC: 82 MG/DL — SIGNIFICANT CHANGE UP (ref 70–99)
HCT VFR BLD CALC: 29.1 % — LOW (ref 39–50)
HGB BLD-MCNC: 9.4 G/DL — LOW (ref 13–17)
IMM GRANULOCYTES # BLD AUTO: 0.01 K/UL — SIGNIFICANT CHANGE UP (ref 0–0.07)
IMM GRANULOCYTES NFR BLD AUTO: 0.2 % — SIGNIFICANT CHANGE UP (ref 0–0.9)
IMMATURE PLATELET FRACTION #: 1.9 K/UL — LOW (ref 3.9–12.5)
IMMATURE PLATELET FRACTION %: 2 % — SIGNIFICANT CHANGE UP (ref 1.6–7.1)
INR BLD: 1.2 RATIO — HIGH (ref 0.85–1.16)
LYMPHOCYTES # BLD AUTO: 0.68 K/UL — LOW (ref 1–3.3)
LYMPHOCYTES NFR BLD AUTO: 12.2 % — LOW (ref 13–44)
MAGNESIUM SERPL-MCNC: 1.7 MG/DL — SIGNIFICANT CHANGE UP (ref 1.6–2.6)
MCHC RBC-ENTMCNC: 31 PG — SIGNIFICANT CHANGE UP (ref 27–34)
MCHC RBC-ENTMCNC: 32.3 G/DL — SIGNIFICANT CHANGE UP (ref 32–36)
MCV RBC AUTO: 96 FL — SIGNIFICANT CHANGE UP (ref 80–100)
MONOCYTES # BLD AUTO: 0.31 K/UL — SIGNIFICANT CHANGE UP (ref 0–0.9)
MONOCYTES NFR BLD AUTO: 5.6 % — SIGNIFICANT CHANGE UP (ref 2–14)
NEUTROPHILS # BLD AUTO: 4.27 K/UL — SIGNIFICANT CHANGE UP (ref 1.8–7.4)
NEUTROPHILS NFR BLD AUTO: 76.4 % — SIGNIFICANT CHANGE UP (ref 43–77)
NRBC # BLD AUTO: 0 K/UL — SIGNIFICANT CHANGE UP (ref 0–0)
NRBC # FLD: 0 K/UL — SIGNIFICANT CHANGE UP (ref 0–0)
NRBC BLD AUTO-RTO: 0 /100 WBCS — SIGNIFICANT CHANGE UP (ref 0–0)
PLATELET # BLD AUTO: 95 K/UL — LOW (ref 150–400)
PMV BLD: 11.3 FL — SIGNIFICANT CHANGE UP (ref 7–13)
POTASSIUM SERPL-MCNC: 4.9 MMOL/L — SIGNIFICANT CHANGE UP (ref 3.5–5.3)
POTASSIUM SERPL-MCNC: 5.6 MMOL/L — HIGH (ref 3.5–5.3)
POTASSIUM SERPL-SCNC: 4.9 MMOL/L — SIGNIFICANT CHANGE UP (ref 3.5–5.3)
POTASSIUM SERPL-SCNC: 5.6 MMOL/L — HIGH (ref 3.5–5.3)
PROT SERPL-MCNC: 7.8 G/DL — SIGNIFICANT CHANGE UP (ref 6.6–8.7)
PROTHROM AB SERPL-ACNC: 13.5 SEC — HIGH (ref 9.9–13.4)
RBC # BLD: 3.03 M/UL — LOW (ref 4.2–5.8)
RBC # FLD: 16.6 % — HIGH (ref 10.3–14.5)
SODIUM SERPL-SCNC: 136 MMOL/L — SIGNIFICANT CHANGE UP (ref 135–145)
SODIUM SERPL-SCNC: 137 MMOL/L — SIGNIFICANT CHANGE UP (ref 135–145)
TROPONIN T, HIGH SENSITIVITY RESULT: 103 NG/L — HIGH (ref 0–51)
TROPONIN T, HIGH SENSITIVITY RESULT: 96 NG/L — HIGH (ref 0–51)
WBC # BLD: 5.58 K/UL — SIGNIFICANT CHANGE UP (ref 3.8–10.5)
WBC # FLD AUTO: 5.58 K/UL — SIGNIFICANT CHANGE UP (ref 3.8–10.5)

## 2025-08-12 PROCEDURE — 85610 PROTHROMBIN TIME: CPT

## 2025-08-12 PROCEDURE — 85025 COMPLETE CBC W/AUTO DIFF WBC: CPT

## 2025-08-12 PROCEDURE — 86077 PHYS BLOOD BANK SERV XMATCH: CPT

## 2025-08-12 PROCEDURE — 96375 TX/PRO/DX INJ NEW DRUG ADDON: CPT

## 2025-08-12 PROCEDURE — 99285 EMERGENCY DEPT VISIT HI MDM: CPT | Mod: 25

## 2025-08-12 PROCEDURE — 99285 EMERGENCY DEPT VISIT HI MDM: CPT

## 2025-08-12 PROCEDURE — 86870 RBC ANTIBODY IDENTIFICATION: CPT

## 2025-08-12 PROCEDURE — 84484 ASSAY OF TROPONIN QUANT: CPT

## 2025-08-12 PROCEDURE — 36415 COLL VENOUS BLD VENIPUNCTURE: CPT

## 2025-08-12 PROCEDURE — 93005 ELECTROCARDIOGRAM TRACING: CPT

## 2025-08-12 PROCEDURE — 71045 X-RAY EXAM CHEST 1 VIEW: CPT | Mod: 26

## 2025-08-12 PROCEDURE — 86900 BLOOD TYPING SEROLOGIC ABO: CPT

## 2025-08-12 PROCEDURE — 80053 COMPREHEN METABOLIC PANEL: CPT

## 2025-08-12 PROCEDURE — 86850 RBC ANTIBODY SCREEN: CPT

## 2025-08-12 PROCEDURE — 86901 BLOOD TYPING SEROLOGIC RH(D): CPT

## 2025-08-12 PROCEDURE — 93010 ELECTROCARDIOGRAM REPORT: CPT | Mod: 77

## 2025-08-12 PROCEDURE — 83735 ASSAY OF MAGNESIUM: CPT

## 2025-08-12 PROCEDURE — 96374 THER/PROPH/DIAG INJ IV PUSH: CPT

## 2025-08-12 PROCEDURE — 71045 X-RAY EXAM CHEST 1 VIEW: CPT

## 2025-08-12 PROCEDURE — 80048 BASIC METABOLIC PNL TOTAL CA: CPT

## 2025-08-12 PROCEDURE — 85730 THROMBOPLASTIN TIME PARTIAL: CPT

## 2025-08-12 PROCEDURE — 82962 GLUCOSE BLOOD TEST: CPT

## 2025-08-12 PROCEDURE — 86880 COOMBS TEST DIRECT: CPT

## 2025-08-12 PROCEDURE — 93010 ELECTROCARDIOGRAM REPORT: CPT

## 2025-08-12 RX ORDER — DEXTROSE 50 % IN WATER 50 %
50 SYRINGE (ML) INTRAVENOUS ONCE
Refills: 0 | Status: COMPLETED | OUTPATIENT
Start: 2025-08-12 | End: 2025-08-12

## 2025-08-12 RX ORDER — DEXTROSE 50 % IN WATER 50 %
25 SYRINGE (ML) INTRAVENOUS ONCE
Refills: 0 | Status: DISCONTINUED | OUTPATIENT
Start: 2025-08-12 | End: 2025-08-19

## 2025-08-12 RX ORDER — DEXTROSE 50 % IN WATER 50 %
12.5 SYRINGE (ML) INTRAVENOUS ONCE
Refills: 0 | Status: DISCONTINUED | OUTPATIENT
Start: 2025-08-12 | End: 2025-08-19

## 2025-08-12 RX ORDER — ONDANSETRON HCL/PF 4 MG/2 ML
4 VIAL (ML) INJECTION ONCE
Refills: 0 | Status: COMPLETED | OUTPATIENT
Start: 2025-08-12 | End: 2025-08-12

## 2025-08-12 RX ORDER — SODIUM CHLORIDE 9 G/1000ML
1000 INJECTION, SOLUTION INTRAVENOUS
Refills: 0 | Status: DISCONTINUED | OUTPATIENT
Start: 2025-08-12 | End: 2025-08-19

## 2025-08-12 RX ORDER — ACETAMINOPHEN 500 MG/5ML
1000 LIQUID (ML) ORAL ONCE
Refills: 0 | Status: COMPLETED | OUTPATIENT
Start: 2025-08-12 | End: 2025-08-12

## 2025-08-12 RX ADMIN — Medication 500 MILLILITER(S): at 14:35

## 2025-08-12 RX ADMIN — Medication 4 MILLIGRAM(S): at 15:19

## 2025-08-12 RX ADMIN — Medication 50 MILLILITER(S): at 14:35

## 2025-08-12 RX ADMIN — Medication 400 MILLIGRAM(S): at 15:19

## 2025-08-12 RX ADMIN — Medication 10 UNIT(S): at 14:35

## 2025-08-16 DIAGNOSIS — R07.9 CHEST PAIN, UNSPECIFIED: ICD-10-CM

## 2025-08-16 DIAGNOSIS — N18.6 END STAGE RENAL DISEASE: ICD-10-CM

## 2025-08-16 DIAGNOSIS — I12.0 HYPERTENSIVE CHRONIC KIDNEY DISEASE WITH STAGE 5 CHRONIC KIDNEY DISEASE OR END STAGE RENAL DISEASE: ICD-10-CM

## 2025-08-16 DIAGNOSIS — I25.10 ATHEROSCLEROTIC HEART DISEASE OF NATIVE CORONARY ARTERY WITHOUT ANGINA PECTORIS: ICD-10-CM

## 2025-08-16 DIAGNOSIS — I45.10 UNSPECIFIED RIGHT BUNDLE-BRANCH BLOCK: ICD-10-CM

## 2025-08-16 DIAGNOSIS — Z88.0 ALLERGY STATUS TO PENICILLIN: ICD-10-CM

## 2025-08-16 DIAGNOSIS — Z95.0 PRESENCE OF CARDIAC PACEMAKER: ICD-10-CM

## 2025-08-16 DIAGNOSIS — I48.91 UNSPECIFIED ATRIAL FIBRILLATION: ICD-10-CM

## 2025-08-21 ENCOUNTER — INPATIENT (INPATIENT)
Facility: HOSPITAL | Age: 65
LOS: 0 days | Discharge: LEFT BEFORE DISCHARGE | DRG: 641 | End: 2025-08-22
Attending: INTERNAL MEDICINE | Admitting: INTERNAL MEDICINE
Payer: MEDICARE

## 2025-08-21 VITALS
DIASTOLIC BLOOD PRESSURE: 78 MMHG | RESPIRATION RATE: 18 BRPM | SYSTOLIC BLOOD PRESSURE: 167 MMHG | HEART RATE: 65 BPM | HEIGHT: 68 IN | TEMPERATURE: 98 F | WEIGHT: 131.84 LBS | OXYGEN SATURATION: 97 %

## 2025-08-21 DIAGNOSIS — Z95.2 PRESENCE OF PROSTHETIC HEART VALVE: Chronic | ICD-10-CM

## 2025-08-21 DIAGNOSIS — Z95.818 PRESENCE OF OTHER CARDIAC IMPLANTS AND GRAFTS: Chronic | ICD-10-CM

## 2025-08-21 DIAGNOSIS — Z95.1 PRESENCE OF AORTOCORONARY BYPASS GRAFT: Chronic | ICD-10-CM

## 2025-08-21 DIAGNOSIS — I77.0 ARTERIOVENOUS FISTULA, ACQUIRED: Chronic | ICD-10-CM

## 2025-08-21 DIAGNOSIS — E87.5 HYPERKALEMIA: ICD-10-CM

## 2025-08-21 DIAGNOSIS — Z86.79 PERSONAL HISTORY OF OTHER DISEASES OF THE CIRCULATORY SYSTEM: Chronic | ICD-10-CM

## 2025-08-21 DIAGNOSIS — Z90.49 ACQUIRED ABSENCE OF OTHER SPECIFIED PARTS OF DIGESTIVE TRACT: Chronic | ICD-10-CM

## 2025-08-21 DIAGNOSIS — Z94.0 KIDNEY TRANSPLANT STATUS: Chronic | ICD-10-CM

## 2025-08-21 LAB
ALBUMIN SERPL ELPH-MCNC: 3.6 G/DL — SIGNIFICANT CHANGE UP (ref 3.3–5.2)
ALBUMIN SERPL ELPH-MCNC: 3.8 G/DL — SIGNIFICANT CHANGE UP (ref 3.3–5.2)
ALLERGY+IMMUNOLOGY DIAG STUDY NOTE: SIGNIFICANT CHANGE UP
ALLERGY+IMMUNOLOGY DIAG STUDY NOTE: SIGNIFICANT CHANGE UP
ALP SERPL-CCNC: 77 U/L — SIGNIFICANT CHANGE UP (ref 40–120)
ALP SERPL-CCNC: 80 U/L — SIGNIFICANT CHANGE UP (ref 40–120)
ALT FLD-CCNC: 12 U/L — SIGNIFICANT CHANGE UP
ALT FLD-CCNC: 13 U/L — SIGNIFICANT CHANGE UP
ANION GAP SERPL CALC-SCNC: 17 MMOL/L — SIGNIFICANT CHANGE UP (ref 5–17)
ANION GAP SERPL CALC-SCNC: 17 MMOL/L — SIGNIFICANT CHANGE UP (ref 5–17)
ANISOCYTOSIS BLD QL: SLIGHT — SIGNIFICANT CHANGE UP
APTT BLD: 37.6 SEC — HIGH (ref 26.1–36.8)
AST SERPL-CCNC: 13 U/L — SIGNIFICANT CHANGE UP
AST SERPL-CCNC: 26 U/L — SIGNIFICANT CHANGE UP
BASOPHILS # BLD AUTO: 0.04 K/UL — SIGNIFICANT CHANGE UP (ref 0–0.2)
BASOPHILS # BLD MANUAL: 0.13 K/UL — SIGNIFICANT CHANGE UP (ref 0–0.2)
BASOPHILS NFR BLD AUTO: 1.2 % — SIGNIFICANT CHANGE UP (ref 0–2)
BASOPHILS NFR BLD MANUAL: 3.4 % — HIGH (ref 0–2)
BILIRUB SERPL-MCNC: 0.5 MG/DL — SIGNIFICANT CHANGE UP (ref 0.4–2)
BILIRUB SERPL-MCNC: 0.6 MG/DL — SIGNIFICANT CHANGE UP (ref 0.4–2)
BLD GP AB SCN SERPL QL: SIGNIFICANT CHANGE UP
BUN SERPL-MCNC: 96.6 MG/DL — HIGH (ref 8–20)
BUN SERPL-MCNC: 97.2 MG/DL — HIGH (ref 8–20)
CALCIUM SERPL-MCNC: 8.5 MG/DL — SIGNIFICANT CHANGE UP (ref 8.4–10.5)
CALCIUM SERPL-MCNC: 8.8 MG/DL — SIGNIFICANT CHANGE UP (ref 8.4–10.5)
CHLORIDE SERPL-SCNC: 101 MMOL/L — SIGNIFICANT CHANGE UP (ref 96–108)
CHLORIDE SERPL-SCNC: 102 MMOL/L — SIGNIFICANT CHANGE UP (ref 96–108)
CO2 SERPL-SCNC: 16 MMOL/L — LOW (ref 22–29)
CO2 SERPL-SCNC: 17 MMOL/L — LOW (ref 22–29)
CREAT SERPL-MCNC: 7.08 MG/DL — HIGH (ref 0.5–1.3)
CREAT SERPL-MCNC: 7.27 MG/DL — HIGH (ref 0.5–1.3)
DACRYOCYTES BLD QL SMEAR: SLIGHT — SIGNIFICANT CHANGE UP
DAT C3-SP REAG RBC QL: ABNORMAL
DAT IGG-SP REAG RBC-IMP: SIGNIFICANT CHANGE UP
DIR ANTIGLOB POLYSPECIFIC INTERPRETATION: ABNORMAL
EGFR: 8 ML/MIN/1.73M2 — LOW
EOSINOPHIL # BLD AUTO: 0.3 K/UL — SIGNIFICANT CHANGE UP (ref 0–0.5)
EOSINOPHIL # BLD MANUAL: 0.22 K/UL — SIGNIFICANT CHANGE UP (ref 0–0.5)
EOSINOPHIL NFR BLD AUTO: 8.7 % — HIGH (ref 0–6)
EOSINOPHIL NFR BLD MANUAL: 6 % — SIGNIFICANT CHANGE UP (ref 0–6)
GAS PNL BLDV: SIGNIFICANT CHANGE UP
GIANT PLATELETS BLD QL SMEAR: PRESENT
GLUCOSE BLDC GLUCOMTR-MCNC: 89 MG/DL — SIGNIFICANT CHANGE UP (ref 70–99)
GLUCOSE SERPL-MCNC: 80 MG/DL — SIGNIFICANT CHANGE UP (ref 70–99)
GLUCOSE SERPL-MCNC: 84 MG/DL — SIGNIFICANT CHANGE UP (ref 70–99)
HCT VFR BLD CALC: 25.5 % — LOW (ref 39–50)
HCT VFR BLD CALC: 25.6 % — LOW (ref 39–50)
HGB BLD-MCNC: 8.1 G/DL — LOW (ref 13–17)
HGB BLD-MCNC: 8.2 G/DL — LOW (ref 13–17)
IMM GRANULOCYTES # BLD AUTO: 0.02 K/UL — SIGNIFICANT CHANGE UP (ref 0–0.07)
IMM GRANULOCYTES NFR BLD AUTO: 0.6 % — SIGNIFICANT CHANGE UP (ref 0–0.9)
IMMATURE PLATELET FRACTION #: 1.6 K/UL — LOW (ref 3.9–12.5)
IMMATURE PLATELET FRACTION #: 1.8 K/UL — LOW (ref 3.9–12.5)
IMMATURE PLATELET FRACTION %: 4 % — SIGNIFICANT CHANGE UP (ref 1.6–7.1)
IMMATURE PLATELET FRACTION %: 5.4 % — SIGNIFICANT CHANGE UP (ref 1.6–7.1)
INR BLD: 1.23 RATIO — HIGH (ref 0.85–1.16)
LACTATE SERPL-SCNC: 0.3 MMOL/L — LOW (ref 0.5–2)
LIDOCAIN IGE QN: 47 U/L — SIGNIFICANT CHANGE UP (ref 22–51)
LYMPHOCYTES # BLD AUTO: 1.15 K/UL — SIGNIFICANT CHANGE UP (ref 1–3.3)
LYMPHOCYTES # BLD MANUAL: 1.41 K/UL — SIGNIFICANT CHANGE UP (ref 1–3.3)
LYMPHOCYTES NFR BLD AUTO: 33.3 % — SIGNIFICANT CHANGE UP (ref 13–44)
LYMPHOCYTES NFR BLD MANUAL: 37.6 % — SIGNIFICANT CHANGE UP (ref 13–44)
MACROCYTES BLD QL: SLIGHT — SIGNIFICANT CHANGE UP
MAGNESIUM SERPL-MCNC: 2 MG/DL — SIGNIFICANT CHANGE UP (ref 1.6–2.6)
MAGNESIUM SERPL-MCNC: 2.1 MG/DL — SIGNIFICANT CHANGE UP (ref 1.6–2.6)
MCHC RBC-ENTMCNC: 30.1 PG — SIGNIFICANT CHANGE UP (ref 27–34)
MCHC RBC-ENTMCNC: 30.7 PG — SIGNIFICANT CHANGE UP (ref 27–34)
MCHC RBC-ENTMCNC: 31.8 G/DL — LOW (ref 32–36)
MCHC RBC-ENTMCNC: 32 G/DL — SIGNIFICANT CHANGE UP (ref 32–36)
MCV RBC AUTO: 94.8 FL — SIGNIFICANT CHANGE UP (ref 80–100)
MCV RBC AUTO: 95.9 FL — SIGNIFICANT CHANGE UP (ref 80–100)
MONOCYTES # BLD AUTO: 0.24 K/UL — SIGNIFICANT CHANGE UP (ref 0–0.9)
MONOCYTES # BLD MANUAL: 0.16 K/UL — SIGNIFICANT CHANGE UP (ref 0–0.9)
MONOCYTES NFR BLD AUTO: 7 % — SIGNIFICANT CHANGE UP (ref 2–14)
MONOCYTES NFR BLD MANUAL: 4.3 % — SIGNIFICANT CHANGE UP (ref 2–14)
NEUTROPHILS # BLD AUTO: 1.7 K/UL — LOW (ref 1.8–7.4)
NEUTROPHILS # BLD MANUAL: 1.82 K/UL — SIGNIFICANT CHANGE UP (ref 1.8–7.4)
NEUTROPHILS NFR BLD AUTO: 49.2 % — SIGNIFICANT CHANGE UP (ref 43–77)
NEUTROPHILS NFR BLD MANUAL: 48.7 % — SIGNIFICANT CHANGE UP (ref 43–77)
NRBC # BLD AUTO: 0 K/UL — SIGNIFICANT CHANGE UP (ref 0–0)
NRBC # BLD AUTO: 0 K/UL — SIGNIFICANT CHANGE UP (ref 0–0)
NRBC # FLD: 0 K/UL — SIGNIFICANT CHANGE UP (ref 0–0)
NRBC # FLD: 0 K/UL — SIGNIFICANT CHANGE UP (ref 0–0)
NRBC BLD AUTO-RTO: 0 /100 WBCS — SIGNIFICANT CHANGE UP (ref 0–0)
NRBC BLD AUTO-RTO: 0 /100 WBCS — SIGNIFICANT CHANGE UP (ref 0–0)
OVALOCYTES BLD QL SMEAR: SLIGHT — SIGNIFICANT CHANGE UP
PHOSPHATE SERPL-MCNC: 6.9 MG/DL — HIGH (ref 2.4–4.7)
PHOSPHATE SERPL-MCNC: 7.2 MG/DL — HIGH (ref 2.4–4.7)
PLAT MORPH BLD: NORMAL — SIGNIFICANT CHANGE UP
PLATELET # BLD AUTO: 33 K/UL — LOW (ref 150–400)
PLATELET # BLD AUTO: 39 K/UL — LOW (ref 150–400)
PMV BLD: 11.2 FL — SIGNIFICANT CHANGE UP (ref 7–13)
PMV BLD: SIGNIFICANT CHANGE UP FL (ref 7–13)
POIKILOCYTOSIS BLD QL AUTO: SLIGHT — SIGNIFICANT CHANGE UP
POTASSIUM SERPL-MCNC: 5.1 MMOL/L — SIGNIFICANT CHANGE UP (ref 3.5–5.3)
POTASSIUM SERPL-MCNC: 7.4 MMOL/L — CRITICAL HIGH (ref 3.5–5.3)
POTASSIUM SERPL-SCNC: 5.1 MMOL/L — SIGNIFICANT CHANGE UP (ref 3.5–5.3)
POTASSIUM SERPL-SCNC: 7.4 MMOL/L — CRITICAL HIGH (ref 3.5–5.3)
PROT SERPL-MCNC: 6.9 G/DL — SIGNIFICANT CHANGE UP (ref 6.6–8.7)
PROT SERPL-MCNC: 7.1 G/DL — SIGNIFICANT CHANGE UP (ref 6.6–8.7)
PROTHROM AB SERPL-ACNC: 14.2 SEC — HIGH (ref 9.9–13.4)
RBC # BLD: 2.67 M/UL — LOW (ref 4.2–5.8)
RBC # BLD: 2.69 M/UL — LOW (ref 4.2–5.8)
RBC # FLD: 16.4 % — HIGH (ref 10.3–14.5)
RBC # FLD: 16.4 % — HIGH (ref 10.3–14.5)
RBC BLD AUTO: ABNORMAL
SODIUM SERPL-SCNC: 134 MMOL/L — LOW (ref 135–145)
SODIUM SERPL-SCNC: 135 MMOL/L — SIGNIFICANT CHANGE UP (ref 135–145)
WBC # BLD: 3.34 K/UL — LOW (ref 3.8–10.5)
WBC # BLD: 3.74 K/UL — LOW (ref 3.8–10.5)
WBC # FLD AUTO: 3.34 K/UL — LOW (ref 3.8–10.5)
WBC # FLD AUTO: 3.74 K/UL — LOW (ref 3.8–10.5)

## 2025-08-21 PROCEDURE — 86900 BLOOD TYPING SEROLOGIC ABO: CPT

## 2025-08-21 PROCEDURE — 86850 RBC ANTIBODY SCREEN: CPT

## 2025-08-21 PROCEDURE — 84295 ASSAY OF SERUM SODIUM: CPT

## 2025-08-21 PROCEDURE — 83735 ASSAY OF MAGNESIUM: CPT

## 2025-08-21 PROCEDURE — 85014 HEMATOCRIT: CPT

## 2025-08-21 PROCEDURE — 99223 1ST HOSP IP/OBS HIGH 75: CPT | Mod: GC

## 2025-08-21 PROCEDURE — 84132 ASSAY OF SERUM POTASSIUM: CPT

## 2025-08-21 PROCEDURE — 82803 BLOOD GASES ANY COMBINATION: CPT

## 2025-08-21 PROCEDURE — 85025 COMPLETE CBC W/AUTO DIFF WBC: CPT

## 2025-08-21 PROCEDURE — 83690 ASSAY OF LIPASE: CPT

## 2025-08-21 PROCEDURE — 82330 ASSAY OF CALCIUM: CPT

## 2025-08-21 PROCEDURE — 86880 COOMBS TEST DIRECT: CPT

## 2025-08-21 PROCEDURE — 71046 X-RAY EXAM CHEST 2 VIEWS: CPT

## 2025-08-21 PROCEDURE — 93005 ELECTROCARDIOGRAM TRACING: CPT

## 2025-08-21 PROCEDURE — 80053 COMPREHEN METABOLIC PANEL: CPT

## 2025-08-21 PROCEDURE — 86870 RBC ANTIBODY IDENTIFICATION: CPT

## 2025-08-21 PROCEDURE — 71046 X-RAY EXAM CHEST 2 VIEWS: CPT | Mod: 26

## 2025-08-21 PROCEDURE — 86077 PHYS BLOOD BANK SERV XMATCH: CPT

## 2025-08-21 PROCEDURE — 82947 ASSAY GLUCOSE BLOOD QUANT: CPT

## 2025-08-21 PROCEDURE — 82435 ASSAY OF BLOOD CHLORIDE: CPT

## 2025-08-21 PROCEDURE — 36415 COLL VENOUS BLD VENIPUNCTURE: CPT

## 2025-08-21 PROCEDURE — 86901 BLOOD TYPING SEROLOGIC RH(D): CPT

## 2025-08-21 PROCEDURE — 85018 HEMOGLOBIN: CPT

## 2025-08-21 PROCEDURE — 84100 ASSAY OF PHOSPHORUS: CPT

## 2025-08-21 PROCEDURE — 82962 GLUCOSE BLOOD TEST: CPT

## 2025-08-21 PROCEDURE — 83605 ASSAY OF LACTIC ACID: CPT

## 2025-08-21 PROCEDURE — 99285 EMERGENCY DEPT VISIT HI MDM: CPT | Mod: FS,GC

## 2025-08-21 PROCEDURE — 85027 COMPLETE CBC AUTOMATED: CPT

## 2025-08-21 PROCEDURE — 85730 THROMBOPLASTIN TIME PARTIAL: CPT

## 2025-08-21 PROCEDURE — 85610 PROTHROMBIN TIME: CPT

## 2025-08-21 RX ORDER — CALCIUM GLUCONATE 20 MG/ML
1 INJECTION, SOLUTION INTRAVENOUS ONCE
Refills: 0 | Status: DISCONTINUED | OUTPATIENT
Start: 2025-08-21 | End: 2025-08-21

## 2025-08-21 RX ORDER — FUROSEMIDE 10 MG/ML
40 INJECTION INTRAMUSCULAR; INTRAVENOUS ONCE
Refills: 0 | Status: DISCONTINUED | OUTPATIENT
Start: 2025-08-21 | End: 2025-08-21

## 2025-08-21 RX ORDER — FUROSEMIDE 10 MG/ML
40 INJECTION INTRAMUSCULAR; INTRAVENOUS ONCE
Refills: 0 | Status: COMPLETED | OUTPATIENT
Start: 2025-08-21 | End: 2025-08-21

## 2025-08-21 RX ORDER — CALCIUM GLUCONATE 20 MG/ML
2 INJECTION, SOLUTION INTRAVENOUS ONCE
Refills: 0 | Status: COMPLETED | OUTPATIENT
Start: 2025-08-21 | End: 2025-08-21

## 2025-08-21 RX ORDER — SODIUM ZIRCONIUM CYCLOSILICATE 5 G/5G
10 POWDER, FOR SUSPENSION ORAL ONCE
Refills: 0 | Status: COMPLETED | OUTPATIENT
Start: 2025-08-21 | End: 2025-08-21

## 2025-08-21 RX ORDER — FUROSEMIDE 10 MG/ML
80 INJECTION INTRAMUSCULAR; INTRAVENOUS ONCE
Refills: 0 | Status: DISCONTINUED | OUTPATIENT
Start: 2025-08-21 | End: 2025-08-21

## 2025-08-21 RX ORDER — SODIUM BICARBONATE 1 MEQ/ML
50 SYRINGE (ML) INTRAVENOUS ONCE
Refills: 0 | Status: COMPLETED | OUTPATIENT
Start: 2025-08-21 | End: 2025-08-21

## 2025-08-21 RX ORDER — DEXTROSE 50 % IN WATER 50 %
50 SYRINGE (ML) INTRAVENOUS ONCE
Refills: 0 | Status: COMPLETED | OUTPATIENT
Start: 2025-08-21 | End: 2025-08-21

## 2025-08-21 RX ADMIN — Medication 50 MILLIEQUIVALENT(S): at 18:41

## 2025-08-21 RX ADMIN — FUROSEMIDE 40 MILLIGRAM(S): 10 INJECTION INTRAMUSCULAR; INTRAVENOUS at 20:41

## 2025-08-21 RX ADMIN — CALCIUM GLUCONATE 200 GRAM(S): 20 INJECTION, SOLUTION INTRAVENOUS at 15:27

## 2025-08-21 RX ADMIN — Medication 5 UNIT(S): at 18:41

## 2025-08-21 RX ADMIN — Medication 50 MILLILITER(S): at 18:40

## 2025-08-21 RX ADMIN — FUROSEMIDE 40 MILLIGRAM(S): 10 INJECTION INTRAMUSCULAR; INTRAVENOUS at 18:41

## 2025-08-21 RX ADMIN — SODIUM ZIRCONIUM CYCLOSILICATE 10 GRAM(S): 5 POWDER, FOR SUSPENSION ORAL at 18:40

## 2025-08-22 VITALS
SYSTOLIC BLOOD PRESSURE: 158 MMHG | HEART RATE: 70 BPM | OXYGEN SATURATION: 96 % | DIASTOLIC BLOOD PRESSURE: 66 MMHG | RESPIRATION RATE: 14 BRPM

## 2025-08-22 LAB
ALBUMIN SERPL ELPH-MCNC: 3.6 G/DL — SIGNIFICANT CHANGE UP (ref 3.3–5.2)
ALP SERPL-CCNC: 81 U/L — SIGNIFICANT CHANGE UP (ref 40–120)
ALT FLD-CCNC: 12 U/L — SIGNIFICANT CHANGE UP
ANION GAP SERPL CALC-SCNC: 14 MMOL/L — SIGNIFICANT CHANGE UP (ref 5–17)
AST SERPL-CCNC: 15 U/L — SIGNIFICANT CHANGE UP
BILIRUB SERPL-MCNC: 0.8 MG/DL — SIGNIFICANT CHANGE UP (ref 0.4–2)
BUN SERPL-MCNC: 38 MG/DL — HIGH (ref 8–20)
CALCIUM SERPL-MCNC: 8.7 MG/DL — SIGNIFICANT CHANGE UP (ref 8.4–10.5)
CHLORIDE SERPL-SCNC: 99 MMOL/L — SIGNIFICANT CHANGE UP (ref 96–108)
CO2 SERPL-SCNC: 25 MMOL/L — SIGNIFICANT CHANGE UP (ref 22–29)
CREAT SERPL-MCNC: 3.28 MG/DL — HIGH (ref 0.5–1.3)
EGFR: 20 ML/MIN/1.73M2 — LOW
EGFR: 20 ML/MIN/1.73M2 — LOW
GLUCOSE BLDC GLUCOMTR-MCNC: 84 MG/DL — SIGNIFICANT CHANGE UP (ref 70–99)
GLUCOSE SERPL-MCNC: 100 MG/DL — HIGH (ref 70–99)
HBV SURFACE AB SER-ACNC: 164.1 MIU/ML — SIGNIFICANT CHANGE UP
HBV SURFACE AG SER-ACNC: SIGNIFICANT CHANGE UP
HCT VFR BLD CALC: 26.2 % — LOW (ref 39–50)
HCV AB S/CO SERPL IA: 0.12 S/CO — SIGNIFICANT CHANGE UP (ref 0–0.79)
HCV AB SERPL-IMP: SIGNIFICANT CHANGE UP
HGB BLD-MCNC: 8.5 G/DL — LOW (ref 13–17)
IMMATURE PLATELET FRACTION #: 1.4 K/UL — LOW (ref 3.9–12.5)
IMMATURE PLATELET FRACTION %: 4.7 % — SIGNIFICANT CHANGE UP (ref 1.6–7.1)
MAGNESIUM SERPL-MCNC: 1.8 MG/DL — SIGNIFICANT CHANGE UP (ref 1.6–2.6)
MCHC RBC-ENTMCNC: 30.2 PG — SIGNIFICANT CHANGE UP (ref 27–34)
MCHC RBC-ENTMCNC: 32.4 G/DL — SIGNIFICANT CHANGE UP (ref 32–36)
MCV RBC AUTO: 93.2 FL — SIGNIFICANT CHANGE UP (ref 80–100)
NRBC # BLD AUTO: 0 K/UL — SIGNIFICANT CHANGE UP (ref 0–0)
NRBC # FLD: 0 K/UL — SIGNIFICANT CHANGE UP (ref 0–0)
NRBC BLD AUTO-RTO: 0 /100 WBCS — SIGNIFICANT CHANGE UP (ref 0–0)
PHOSPHATE SERPL-MCNC: 2.9 MG/DL — SIGNIFICANT CHANGE UP (ref 2.4–4.7)
PLATELET # BLD AUTO: 30 K/UL — LOW (ref 150–400)
PMV BLD: 12.1 FL — SIGNIFICANT CHANGE UP (ref 7–13)
POTASSIUM SERPL-MCNC: 3.2 MMOL/L — LOW (ref 3.5–5.3)
POTASSIUM SERPL-SCNC: 3.2 MMOL/L — LOW (ref 3.5–5.3)
PROT SERPL-MCNC: 6.6 G/DL — SIGNIFICANT CHANGE UP (ref 6.6–8.7)
RBC # BLD: 2.81 M/UL — LOW (ref 4.2–5.8)
RBC # FLD: 16 % — HIGH (ref 10.3–14.5)
SODIUM SERPL-SCNC: 138 MMOL/L — SIGNIFICANT CHANGE UP (ref 135–145)
WBC # BLD: 3.03 K/UL — LOW (ref 3.8–10.5)
WBC # FLD AUTO: 3.03 K/UL — LOW (ref 3.8–10.5)

## 2025-08-22 PROCEDURE — 86803 HEPATITIS C AB TEST: CPT

## 2025-08-22 PROCEDURE — 86870 RBC ANTIBODY IDENTIFICATION: CPT

## 2025-08-22 PROCEDURE — 85027 COMPLETE CBC AUTOMATED: CPT

## 2025-08-22 PROCEDURE — 83690 ASSAY OF LIPASE: CPT

## 2025-08-22 PROCEDURE — 36415 COLL VENOUS BLD VENIPUNCTURE: CPT

## 2025-08-22 PROCEDURE — 86706 HEP B SURFACE ANTIBODY: CPT

## 2025-08-22 PROCEDURE — 86880 COOMBS TEST DIRECT: CPT

## 2025-08-22 PROCEDURE — 93005 ELECTROCARDIOGRAM TRACING: CPT

## 2025-08-22 PROCEDURE — 85014 HEMATOCRIT: CPT

## 2025-08-22 PROCEDURE — 87340 HEPATITIS B SURFACE AG IA: CPT

## 2025-08-22 PROCEDURE — 80053 COMPREHEN METABOLIC PANEL: CPT

## 2025-08-22 PROCEDURE — 86900 BLOOD TYPING SEROLOGIC ABO: CPT

## 2025-08-22 PROCEDURE — 85610 PROTHROMBIN TIME: CPT

## 2025-08-22 PROCEDURE — 82803 BLOOD GASES ANY COMBINATION: CPT

## 2025-08-22 PROCEDURE — 82962 GLUCOSE BLOOD TEST: CPT

## 2025-08-22 PROCEDURE — 85025 COMPLETE CBC W/AUTO DIFF WBC: CPT

## 2025-08-22 PROCEDURE — 84295 ASSAY OF SERUM SODIUM: CPT

## 2025-08-22 PROCEDURE — 71046 X-RAY EXAM CHEST 2 VIEWS: CPT

## 2025-08-22 PROCEDURE — 84100 ASSAY OF PHOSPHORUS: CPT

## 2025-08-22 PROCEDURE — 82330 ASSAY OF CALCIUM: CPT

## 2025-08-22 PROCEDURE — 85018 HEMOGLOBIN: CPT

## 2025-08-22 PROCEDURE — 84132 ASSAY OF SERUM POTASSIUM: CPT

## 2025-08-22 PROCEDURE — 82947 ASSAY GLUCOSE BLOOD QUANT: CPT

## 2025-08-22 PROCEDURE — 99232 SBSQ HOSP IP/OBS MODERATE 35: CPT | Mod: GC

## 2025-08-22 PROCEDURE — 83605 ASSAY OF LACTIC ACID: CPT

## 2025-08-22 PROCEDURE — 86901 BLOOD TYPING SEROLOGIC RH(D): CPT

## 2025-08-22 PROCEDURE — 83735 ASSAY OF MAGNESIUM: CPT

## 2025-08-22 PROCEDURE — 85730 THROMBOPLASTIN TIME PARTIAL: CPT

## 2025-08-22 PROCEDURE — 86850 RBC ANTIBODY SCREEN: CPT

## 2025-08-22 PROCEDURE — 82435 ASSAY OF BLOOD CHLORIDE: CPT

## 2025-08-22 RX ORDER — ATORVASTATIN CALCIUM 80 MG/1
40 TABLET, FILM COATED ORAL AT BEDTIME
Refills: 0 | Status: DISCONTINUED | OUTPATIENT
Start: 2025-08-22 | End: 2025-08-22

## 2025-08-22 RX ORDER — CARVEDILOL 3.12 MG/1
6.25 TABLET, FILM COATED ORAL EVERY 12 HOURS
Refills: 0 | Status: DISCONTINUED | OUTPATIENT
Start: 2025-08-22 | End: 2025-08-22

## 2025-08-22 RX ORDER — ASPIRIN 325 MG
81 TABLET ORAL DAILY
Refills: 0 | Status: DISCONTINUED | OUTPATIENT
Start: 2025-08-22 | End: 2025-08-22

## 2025-08-22 RX ORDER — CARVEDILOL 3.12 MG/1
6.25 TABLET, FILM COATED ORAL ONCE
Refills: 0 | Status: COMPLETED | OUTPATIENT
Start: 2025-08-22 | End: 2025-08-22

## 2025-08-22 RX ORDER — SACUBITRIL AND VALSARTAN 6; 6 MG/1; MG/1
1 PELLET ORAL
Refills: 0 | Status: DISCONTINUED | OUTPATIENT
Start: 2025-08-22 | End: 2025-08-22

## 2025-08-22 RX ORDER — FOLIC ACID 1 MG/1
1 TABLET ORAL DAILY
Refills: 0 | Status: DISCONTINUED | OUTPATIENT
Start: 2025-08-22 | End: 2025-08-22

## 2025-08-22 RX ORDER — ISOSORBIDE MONONITRATE 60 MG/1
30 TABLET, EXTENDED RELEASE ORAL DAILY
Refills: 0 | Status: DISCONTINUED | OUTPATIENT
Start: 2025-08-22 | End: 2025-08-22

## 2025-08-22 RX ORDER — ALPRAZOLAM 0.5 MG
0.5 TABLET, EXTENDED RELEASE 24 HR ORAL ONCE
Refills: 0 | Status: DISCONTINUED | OUTPATIENT
Start: 2025-08-22 | End: 2025-08-22

## 2025-08-22 RX ORDER — LEVETIRACETAM 10 MG/ML
500 INJECTION, SOLUTION INTRAVENOUS
Refills: 0 | Status: DISCONTINUED | OUTPATIENT
Start: 2025-08-22 | End: 2025-08-22

## 2025-08-22 RX ADMIN — Medication 0.5 MILLIGRAM(S): at 09:13

## 2025-08-22 RX ADMIN — SACUBITRIL AND VALSARTAN 1 TABLET(S): 6; 6 PELLET ORAL at 05:19

## 2025-08-22 RX ADMIN — CARVEDILOL 6.25 MILLIGRAM(S): 3.12 TABLET, FILM COATED ORAL at 02:30

## 2025-08-26 ENCOUNTER — APPOINTMENT (OUTPATIENT)
Dept: CARDIOLOGY | Facility: CLINIC | Age: 65
End: 2025-08-26
Payer: MEDICARE

## 2025-08-26 VITALS
OXYGEN SATURATION: 99 % | HEART RATE: 62 BPM | HEIGHT: 68 IN | SYSTOLIC BLOOD PRESSURE: 170 MMHG | WEIGHT: 136 LBS | DIASTOLIC BLOOD PRESSURE: 74 MMHG | BODY MASS INDEX: 20.61 KG/M2

## 2025-08-26 DIAGNOSIS — I42.9 CARDIOMYOPATHY, UNSPECIFIED: ICD-10-CM

## 2025-08-26 DIAGNOSIS — I25.10 ATHEROSCLEROTIC HEART DISEASE OF NATIVE CORONARY ARTERY W/OUT ANGINA PECTORIS: ICD-10-CM

## 2025-08-26 DIAGNOSIS — I10 ESSENTIAL (PRIMARY) HYPERTENSION: ICD-10-CM

## 2025-08-26 DIAGNOSIS — I50.20 UNSPECIFIED SYSTOLIC (CONGESTIVE) HEART FAILURE: ICD-10-CM

## 2025-08-26 PROCEDURE — 99214 OFFICE O/P EST MOD 30 MIN: CPT

## 2025-08-26 PROCEDURE — 93000 ELECTROCARDIOGRAM COMPLETE: CPT

## 2025-08-27 LAB
ANTIBODY IDENTIFICATION: SIGNIFICANT CHANGE UP
Lab: SIGNIFICANT CHANGE UP

## 2025-08-27 PROCEDURE — 87340 HEPATITIS B SURFACE AG IA: CPT

## 2025-08-27 PROCEDURE — 83605 ASSAY OF LACTIC ACID: CPT

## 2025-08-27 PROCEDURE — 82962 GLUCOSE BLOOD TEST: CPT

## 2025-08-27 PROCEDURE — 36415 COLL VENOUS BLD VENIPUNCTURE: CPT

## 2025-08-27 PROCEDURE — 86706 HEP B SURFACE ANTIBODY: CPT

## 2025-08-27 PROCEDURE — 83735 ASSAY OF MAGNESIUM: CPT

## 2025-08-27 PROCEDURE — 84132 ASSAY OF SERUM POTASSIUM: CPT

## 2025-08-27 PROCEDURE — 93005 ELECTROCARDIOGRAM TRACING: CPT

## 2025-08-27 PROCEDURE — 84100 ASSAY OF PHOSPHORUS: CPT

## 2025-08-27 PROCEDURE — 85730 THROMBOPLASTIN TIME PARTIAL: CPT

## 2025-08-27 PROCEDURE — 96374 THER/PROPH/DIAG INJ IV PUSH: CPT

## 2025-08-27 PROCEDURE — 86870 RBC ANTIBODY IDENTIFICATION: CPT

## 2025-08-27 PROCEDURE — 86850 RBC ANTIBODY SCREEN: CPT

## 2025-08-27 PROCEDURE — 86901 BLOOD TYPING SEROLOGIC RH(D): CPT

## 2025-08-27 PROCEDURE — 85014 HEMATOCRIT: CPT

## 2025-08-27 PROCEDURE — 82330 ASSAY OF CALCIUM: CPT

## 2025-08-27 PROCEDURE — 85610 PROTHROMBIN TIME: CPT

## 2025-08-27 PROCEDURE — 85018 HEMOGLOBIN: CPT

## 2025-08-27 PROCEDURE — 99285 EMERGENCY DEPT VISIT HI MDM: CPT | Mod: 25

## 2025-08-27 PROCEDURE — 85027 COMPLETE CBC AUTOMATED: CPT

## 2025-08-27 PROCEDURE — 85025 COMPLETE CBC W/AUTO DIFF WBC: CPT

## 2025-08-27 PROCEDURE — 82803 BLOOD GASES ANY COMBINATION: CPT

## 2025-08-27 PROCEDURE — 86803 HEPATITIS C AB TEST: CPT

## 2025-08-27 PROCEDURE — 80053 COMPREHEN METABOLIC PANEL: CPT

## 2025-08-27 PROCEDURE — 82947 ASSAY GLUCOSE BLOOD QUANT: CPT

## 2025-08-27 PROCEDURE — 71046 X-RAY EXAM CHEST 2 VIEWS: CPT

## 2025-08-27 PROCEDURE — 83690 ASSAY OF LIPASE: CPT

## 2025-08-27 PROCEDURE — 86900 BLOOD TYPING SEROLOGIC ABO: CPT

## 2025-08-27 PROCEDURE — 82435 ASSAY OF BLOOD CHLORIDE: CPT

## 2025-08-27 PROCEDURE — 84295 ASSAY OF SERUM SODIUM: CPT

## 2025-08-27 PROCEDURE — 86880 COOMBS TEST DIRECT: CPT

## (undated) DEVICE — Device

## (undated) DEVICE — SOL IRR POUR H2O 1000ML

## (undated) DEVICE — DRSG KERLIX ROLL 4.5"

## (undated) DEVICE — ELCTR STRYKER NEPTUNE SMOKE EVACUATION PENCIL (GREEN)

## (undated) DEVICE — SUT MONOCRYL 4-0 27" PS-2 UNDYED

## (undated) DEVICE — STAPLER SKIN PROXIMATE

## (undated) DEVICE — DRSG KLING 4"

## (undated) DEVICE — ULTRASOUND PROBE COVER T-SHAPED INTRAOP SM

## (undated) DEVICE — SPONGE PEANUT AUTO COUNT

## (undated) DEVICE — SUT SILK 4-0 30" TIES

## (undated) DEVICE — UNDERPAD LINEN SAVER 23 X 36"

## (undated) DEVICE — DRAPE TOWEL BLUE 17" X 24"

## (undated) DEVICE — DRSG MASTISOL

## (undated) DEVICE — SOL IRR POUR NS 0.9% 1000ML

## (undated) DEVICE — SYR CONTROL LUER LOK 10CC

## (undated) DEVICE — TUBING IV EXTENSION MACRO W CLAVE 7"

## (undated) DEVICE — DRAPE XL SHEET 77X98"

## (undated) DEVICE — SUT VICRYL 2-0 27" SH UNDYED

## (undated) DEVICE — DRAIN PENROSE .25" X 18" LATEX

## (undated) DEVICE — SUT VICRYL 3-0 27" SH UNDYED

## (undated) DEVICE — CLAMP BULLDOG MIDI STRAIGHT (YELLOW) DISP

## (undated) DEVICE — PACK VASCULAR

## (undated) DEVICE — VESSEL LOOP MINI-BLUE 0.075" X 16"

## (undated) DEVICE — DRSG CURITY GAUZE SPONGE 4 X 4" 12-PLY NON-STERILE

## (undated) DEVICE — DRAPE ISOLATION BAG 20X20"

## (undated) DEVICE — CATH IV INTROCAN SAFETY 16G X 1.25" (GRAY) PUR

## (undated) DEVICE — PRIORITY PACK WITH COPILOT 20/30

## (undated) DEVICE — PREP CHLORAPREP HI-LITE ORANGE 26ML

## (undated) DEVICE — POSITIONER CARDIAC BUMP

## (undated) DEVICE — TUBING ALARIS PUMP MODULE NON-DEHP

## (undated) DEVICE — NDL HYPO REGULAR BEVEL 25G X 1.5" (BLUE)

## (undated) DEVICE — ELCTR GROUNDING PAD ADULT COVIDIEN

## (undated) DEVICE — SUT POLYSORB 3-0 30" V-20 UNDYED

## (undated) DEVICE — DENTURE CUP PINK

## (undated) DEVICE — SUT SILK 3-0 30" TIES

## (undated) DEVICE — PLASTIC SOLUTION BOWL 160Z

## (undated) DEVICE — PACK UNIVERSAL DRAPE

## (undated) DEVICE — SOL BAG NS 0.9% 1000ML

## (undated) DEVICE — CANISTER SUCTION LID GUARD 3000CC

## (undated) DEVICE — SUT CHROMIC 2-0 60" REEL

## (undated) DEVICE — LAP PAD 18 X 18"

## (undated) DEVICE — SUT PROLENE 6-0 30" BV-1

## (undated) DEVICE — SOLIDIFIER CANN EXPRESS 3K

## (undated) DEVICE — GOWN IMPERV XL

## (undated) DEVICE — PACK UPPER EXTREMITY

## (undated) DEVICE — TUNNELER SHEATH GREEN LARGE

## (undated) DEVICE — DRAIN RESERVOIR FOR JACKSON PRATT 100CC CARDINAL

## (undated) DEVICE — SYR SLIP 10CC

## (undated) DEVICE — DRAPE SPLIT SHEET 77" X 108"

## (undated) DEVICE — GLV 8 PROTEXIS (WHITE)

## (undated) DEVICE — VENODYNE/SCD SLEEVE CALF MEDIUM

## (undated) DEVICE — SUT VICRYL 3-0 27" SH

## (undated) DEVICE — SUT SILK 2-0 30" TIES

## (undated) DEVICE — POSITIONER FOAM HEAD CRADLE (PINK)

## (undated) DEVICE — FORCEP RADIAL JAW 4 W NDL 2.4MM 2.8MM 240CM ORANGE DISP

## (undated) DEVICE — SUT SOFSILK 4-0 98" REEL

## (undated) DEVICE — TOURNIQUET CUFF 24" DUAL PORT SINGLE BLADDER W PLC (BLACK)

## (undated) DEVICE — DRSG 4 X 8

## (undated) DEVICE — SOL IRR BAG H2O 1000ML

## (undated) DEVICE — SUT SURGIPRO II 6-0 24" CV-11 DA

## (undated) DEVICE — DRSG TEGADERM 4 X 4.75"

## (undated) DEVICE — PACK IV START WITH CHG

## (undated) DEVICE — STAPLER SKIN VISI-STAT 35 WIDE

## (undated) DEVICE — DRSG TAPE UMBILICAL COTTON 2" X 30 X 1/8"

## (undated) DEVICE — SPECIMEN CONTAINER PET

## (undated) DEVICE — POSITIONER STRAP ARMBOARD VELCRO TS-30

## (undated) DEVICE — CLAMP SURG SFT-FBR 33MM

## (undated) DEVICE — CATH IV SAFE BC 22G X 1" (BLUE)

## (undated) DEVICE — SUT SILK 0 30" SH

## (undated) DEVICE — DRAPE C ARM UNIVERSAL

## (undated) DEVICE — GLV 6.5 PROTEXIS (WHITE)

## (undated) DEVICE — SUT POLYSORB 2-0 30" V-20 UNDYED

## (undated) DEVICE — DRSG 2X2

## (undated) DEVICE — SUT GORETEX CV-5 (4-0) 36" TTC-13

## (undated) DEVICE — SYR IV FLUSH SALINE 10ML 30/TY

## (undated) DEVICE — GLV 7 PROTEXIS ORTHO (BROWN)

## (undated) DEVICE — SYR LUER SLIP TIP 50CC

## (undated) DEVICE — SUT PROLENE 6-0 30" C-1

## (undated) DEVICE — DRSG STERISTRIPS 0.5 X 4"

## (undated) DEVICE — DRAPE IOBAN 33" X 23"

## (undated) DEVICE — SOL IRR POUR H2O 1500ML

## (undated) DEVICE — SOL IRR POUR NS 0.9% 500ML

## (undated) DEVICE — VALVE ENDOSCOPE DEFENDO SINGLE USE

## (undated) DEVICE — WARMING BLANKET LOWER ADULT

## (undated) DEVICE — DRAPE HAND 77" X 146"

## (undated) DEVICE — SYR LUER LOK 20CC

## (undated) DEVICE — GLV 7 PROTEXIS (WHITE)

## (undated) DEVICE — GLV 6 PROTEXIS (WHITE)

## (undated) DEVICE — SUT PROLENE 5-0 36" C-1

## (undated) DEVICE — DRSG DERMABOND 0.7ML

## (undated) DEVICE — GLV 7.5 PROTEXIS (WHITE)

## (undated) DEVICE — SUT GORETEX CV-6 (5-0) 36" TTC-13

## (undated) DEVICE — MASK PROCEED EARLOOP LVL 2 50/BX

## (undated) DEVICE — WARMING BLANKET UPPER ADULT

## (undated) DEVICE — SENSOR O2 FINGER ADULT

## (undated) DEVICE — SYR LUER LOK 50CC